# Patient Record
Sex: FEMALE | Race: OTHER | NOT HISPANIC OR LATINO | URBAN - METROPOLITAN AREA
[De-identification: names, ages, dates, MRNs, and addresses within clinical notes are randomized per-mention and may not be internally consistent; named-entity substitution may affect disease eponyms.]

---

## 2018-07-30 ENCOUNTER — APPOINTMENT (RX ONLY)
Dept: URBAN - METROPOLITAN AREA CLINIC 57 | Facility: CLINIC | Age: 43
Setting detail: DERMATOLOGY
End: 2018-07-30

## 2018-07-30 DIAGNOSIS — L21.8 OTHER SEBORRHEIC DERMATITIS: ICD-10-CM

## 2018-07-30 DIAGNOSIS — L71.8 OTHER ROSACEA: ICD-10-CM

## 2018-07-30 PROCEDURE — ? TREATMENT REGIMEN

## 2018-07-30 PROCEDURE — ? SEPARATE AND IDENTIFIABLE DOCUMENTATION

## 2018-07-30 PROCEDURE — 99203 OFFICE O/P NEW LOW 30 MIN: CPT

## 2018-07-30 PROCEDURE — ? PRESCRIPTION

## 2018-07-30 PROCEDURE — ? COUNSELING

## 2018-07-30 RX ORDER — METRONIDAZOLE 7.5 MG/G
CREAM TOPICAL QAM
Qty: 1 | Refills: 2 | Status: ERX | COMMUNITY
Start: 2018-07-30

## 2018-07-30 RX ORDER — DOXYCYCLINE HYCLATE 100 MG/1
TABLET, COATED ORAL
Qty: 30 | Refills: 0 | Status: ERX | COMMUNITY
Start: 2018-07-30

## 2018-07-30 RX ORDER — KETOCONAZOLE 20.5 MG/ML
SHAMPOO, SUSPENSION TOPICAL
Qty: 1 | Refills: 2 | Status: ERX | COMMUNITY
Start: 2018-07-30

## 2018-07-30 RX ADMIN — METRONIDAZOLE: 7.5 CREAM TOPICAL at 21:37

## 2018-07-30 RX ADMIN — DOXYCYCLINE HYCLATE: 100 TABLET, COATED ORAL at 21:37

## 2018-07-30 RX ADMIN — KETOCONAZOLE: 20.5 SHAMPOO, SUSPENSION TOPICAL at 21:36

## 2018-07-30 ASSESSMENT — LOCATION DETAILED DESCRIPTION DERM
LOCATION DETAILED: LEFT SUPERIOR LATERAL MALAR CHEEK
LOCATION DETAILED: RIGHT CENTRAL PARIETAL SCALP
LOCATION DETAILED: RIGHT CENTRAL MALAR CHEEK
LOCATION DETAILED: LEFT CENTRAL PARIETAL SCALP
LOCATION DETAILED: LEFT CENTRAL MALAR CHEEK
LOCATION DETAILED: LEFT INFERIOR CENTRAL MALAR CHEEK
LOCATION DETAILED: RIGHT CENTRAL MALAR CHEEK

## 2018-07-30 ASSESSMENT — LOCATION ZONE DERM
LOCATION ZONE: FACE
LOCATION ZONE: SCALP
LOCATION ZONE: FACE

## 2018-07-30 ASSESSMENT — LOCATION SIMPLE DESCRIPTION DERM
LOCATION SIMPLE: LEFT CHEEK
LOCATION SIMPLE: SCALP
LOCATION SIMPLE: RIGHT CHEEK
LOCATION SIMPLE: RIGHT CHEEK
LOCATION SIMPLE: LEFT CHEEK

## 2018-09-17 ENCOUNTER — APPOINTMENT (RX ONLY)
Dept: URBAN - METROPOLITAN AREA CLINIC 57 | Facility: CLINIC | Age: 43
Setting detail: DERMATOLOGY
End: 2018-09-17

## 2018-09-17 ENCOUNTER — RX ONLY (OUTPATIENT)
Age: 43
Setting detail: RX ONLY
End: 2018-09-17

## 2018-09-17 DIAGNOSIS — L21.8 OTHER SEBORRHEIC DERMATITIS: ICD-10-CM

## 2018-09-17 DIAGNOSIS — L71.8 OTHER ROSACEA: ICD-10-CM

## 2018-09-17 PROCEDURE — ? TREATMENT REGIMEN

## 2018-09-17 PROCEDURE — ? PRESCRIPTION

## 2018-09-17 PROCEDURE — ? COUNSELING

## 2018-09-17 PROCEDURE — 99213 OFFICE O/P EST LOW 20 MIN: CPT

## 2018-09-17 RX ORDER — METRONIDAZOLE 7.5 MG/G
CREAM TOPICAL QAM
Qty: 1 | Refills: 2 | Status: ERX

## 2018-09-17 RX ORDER — HYDROCORTISONE 25 MG/G
CREAM TOPICAL
Qty: 1 | Refills: 1 | Status: ERX | COMMUNITY
Start: 2018-09-17

## 2018-09-17 RX ORDER — KETOCONAZOLE 20.5 MG/ML
SHAMPOO, SUSPENSION TOPICAL
Qty: 1 | Refills: 2 | Status: ERX

## 2018-09-17 RX ORDER — DOXYCYCLINE HYCLATE 100 MG/1
TABLET, COATED ORAL
Qty: 30 | Refills: 1 | Status: ERX

## 2018-09-17 RX ADMIN — HYDROCORTISONE: 25 CREAM TOPICAL at 23:34

## 2018-09-17 ASSESSMENT — LOCATION SIMPLE DESCRIPTION DERM
LOCATION SIMPLE: LEFT CHEEK
LOCATION SIMPLE: LEFT SCALP
LOCATION SIMPLE: NOSE
LOCATION SIMPLE: RIGHT CHEEK

## 2018-09-17 ASSESSMENT — LOCATION DETAILED DESCRIPTION DERM
LOCATION DETAILED: LEFT MEDIAL FRONTAL SCALP
LOCATION DETAILED: LEFT INFERIOR MEDIAL MALAR CHEEK
LOCATION DETAILED: RIGHT MEDIAL MALAR CHEEK
LOCATION DETAILED: NASAL DORSUM
LOCATION DETAILED: RIGHT INFERIOR MEDIAL MALAR CHEEK
LOCATION DETAILED: LEFT INFERIOR CENTRAL MALAR CHEEK

## 2018-09-17 ASSESSMENT — LOCATION ZONE DERM
LOCATION ZONE: FACE
LOCATION ZONE: NOSE
LOCATION ZONE: SCALP

## 2018-09-17 NOTE — PROCEDURE: TREATMENT REGIMEN
Continue Regimen: Ketoconazole 2% shampoo QOD
Detail Level: Zone
Initiate Treatment: Hydrocortisone 2.5% cream QHS x2 weeks D/C x2 weeks
Continue Regimen: Metronidazole 0.75% cream QAM and doxycycline 100mg QD

## 2018-11-13 ENCOUNTER — APPOINTMENT (OUTPATIENT)
Dept: RADIOLOGY | Facility: MEDICAL CENTER | Age: 43
DRG: 189 | End: 2018-11-13
Attending: EMERGENCY MEDICINE
Payer: MEDICAID

## 2018-11-13 ENCOUNTER — APPOINTMENT (OUTPATIENT)
Dept: RADIOLOGY | Facility: MEDICAL CENTER | Age: 43
DRG: 189 | End: 2018-11-13
Payer: MEDICAID

## 2018-11-13 ENCOUNTER — HOSPITAL ENCOUNTER (INPATIENT)
Facility: MEDICAL CENTER | Age: 43
LOS: 8 days | DRG: 189 | End: 2018-11-21
Attending: EMERGENCY MEDICINE | Admitting: HOSPITALIST
Payer: MEDICAID

## 2018-11-13 DIAGNOSIS — J44.9 OSA AND COPD OVERLAP SYNDROME (HCC): ICD-10-CM

## 2018-11-13 DIAGNOSIS — R09.02 HYPOXIA: ICD-10-CM

## 2018-11-13 DIAGNOSIS — G47.33 OSA AND COPD OVERLAP SYNDROME (HCC): ICD-10-CM

## 2018-11-13 DIAGNOSIS — J44.1 ACUTE EXACERBATION OF CHRONIC OBSTRUCTIVE PULMONARY DISEASE (COPD) (HCC): ICD-10-CM

## 2018-11-13 DIAGNOSIS — J44.1 CHRONIC OBSTRUCTIVE PULMONARY DISEASE WITH ACUTE EXACERBATION (HCC): ICD-10-CM

## 2018-11-13 DIAGNOSIS — J96.01 ACUTE HYPOXEMIC RESPIRATORY FAILURE (HCC): ICD-10-CM

## 2018-11-13 DIAGNOSIS — E66.01 CLASS 3 SEVERE OBESITY DUE TO EXCESS CALORIES WITH SERIOUS COMORBIDITY AND BODY MASS INDEX (BMI) OF 50.0 TO 59.9 IN ADULT (HCC): ICD-10-CM

## 2018-11-13 PROBLEM — I51.7 CARDIOMEGALY: Status: ACTIVE | Noted: 2018-11-13

## 2018-11-13 PROBLEM — F31.9 BIPOLAR AFFECTIVE DISORDER (HCC): Status: ACTIVE | Noted: 2018-11-13

## 2018-11-13 LAB
ALBUMIN SERPL BCP-MCNC: 3.9 G/DL (ref 3.2–4.9)
ALBUMIN/GLOB SERPL: 1.4 G/DL
ALP SERPL-CCNC: 68 U/L (ref 30–99)
ALT SERPL-CCNC: 8 U/L (ref 2–50)
AMPHET UR QL SCN: NEGATIVE
ANION GAP SERPL CALC-SCNC: 3 MMOL/L (ref 0–11.9)
APPEARANCE UR: CLEAR
AST SERPL-CCNC: 6 U/L (ref 12–45)
BACTERIA #/AREA URNS HPF: ABNORMAL /HPF
BARBITURATES UR QL SCN: NEGATIVE
BASE EXCESS BLDA CALC-SCNC: 2 MMOL/L (ref -4–3)
BASOPHILS # BLD AUTO: 0.2 % (ref 0–1.8)
BASOPHILS # BLD: 0.02 K/UL (ref 0–0.12)
BENZODIAZ UR QL SCN: NEGATIVE
BILIRUB SERPL-MCNC: 1 MG/DL (ref 0.1–1.5)
BILIRUB UR QL STRIP.AUTO: NEGATIVE
BNP SERPL-MCNC: 45 PG/ML (ref 0–100)
BODY TEMPERATURE: ABNORMAL CENTIGRADE
BUN SERPL-MCNC: 11 MG/DL (ref 8–22)
BZE UR QL SCN: NEGATIVE
CALCIUM SERPL-MCNC: 9.3 MG/DL (ref 8.5–10.5)
CANNABINOIDS UR QL SCN: NEGATIVE
CHLORIDE SERPL-SCNC: 103 MMOL/L (ref 96–112)
CO2 SERPL-SCNC: 33 MMOL/L (ref 20–33)
COLOR UR: YELLOW
CREAT SERPL-MCNC: 0.73 MG/DL (ref 0.5–1.4)
D DIMER PPP IA.FEU-MCNC: <0.4 UG/ML (FEU) (ref 0–0.5)
EKG IMPRESSION: NORMAL
EOSINOPHIL # BLD AUTO: 0.18 K/UL (ref 0–0.51)
EOSINOPHIL NFR BLD: 1.5 % (ref 0–6.9)
EPI CELLS #/AREA URNS HPF: ABNORMAL /HPF
ERYTHROCYTE [DISTWIDTH] IN BLOOD BY AUTOMATED COUNT: 51.7 FL (ref 35.9–50)
FLUAV RNA SPEC QL NAA+PROBE: NEGATIVE
FLUBV RNA SPEC QL NAA+PROBE: NEGATIVE
GLOBULIN SER CALC-MCNC: 2.7 G/DL (ref 1.9–3.5)
GLUCOSE SERPL-MCNC: 84 MG/DL (ref 65–99)
GLUCOSE UR STRIP.AUTO-MCNC: NEGATIVE MG/DL
HCG SERPL QL: NEGATIVE
HCO3 BLDA-SCNC: 31 MMOL/L (ref 17–25)
HCT VFR BLD AUTO: 40.6 % (ref 37–47)
HGB BLD-MCNC: 12.2 G/DL (ref 12–16)
HYALINE CASTS #/AREA URNS LPF: ABNORMAL /LPF
IMM GRANULOCYTES # BLD AUTO: 0.05 K/UL (ref 0–0.11)
IMM GRANULOCYTES NFR BLD AUTO: 0.4 % (ref 0–0.9)
KETONES UR STRIP.AUTO-MCNC: NEGATIVE MG/DL
LEUKOCYTE ESTERASE UR QL STRIP.AUTO: NEGATIVE
LYMPHOCYTES # BLD AUTO: 1.56 K/UL (ref 1–4.8)
LYMPHOCYTES NFR BLD: 13 % (ref 22–41)
MAGNESIUM SERPL-MCNC: 2.4 MG/DL (ref 1.5–2.5)
MCH RBC QN AUTO: 29 PG (ref 27–33)
MCHC RBC AUTO-ENTMCNC: 30 G/DL (ref 33.6–35)
MCV RBC AUTO: 96.4 FL (ref 81.4–97.8)
METHADONE UR QL SCN: NEGATIVE
MICRO URNS: ABNORMAL
MONOCYTES # BLD AUTO: 0.64 K/UL (ref 0–0.85)
MONOCYTES NFR BLD AUTO: 5.3 % (ref 0–13.4)
NEUTROPHILS # BLD AUTO: 9.52 K/UL (ref 2–7.15)
NEUTROPHILS NFR BLD: 79.6 % (ref 44–72)
NITRITE UR QL STRIP.AUTO: NEGATIVE
NRBC # BLD AUTO: 0 K/UL
NRBC BLD-RTO: 0 /100 WBC
OPIATES UR QL SCN: NEGATIVE
OXYCODONE UR QL SCN: NEGATIVE
PCO2 BLDA: 66.8 MMHG (ref 26–37)
PCP UR QL SCN: NEGATIVE
PH BLDA: 7.28 [PH] (ref 7.4–7.5)
PH UR STRIP.AUTO: 6 [PH]
PLATELET # BLD AUTO: 227 K/UL (ref 164–446)
PMV BLD AUTO: 11.5 FL (ref 9–12.9)
PO2 BLDA: 75.4 MMHG (ref 64–87)
POTASSIUM SERPL-SCNC: 4.2 MMOL/L (ref 3.6–5.5)
PROCALCITONIN SERPL-MCNC: 0.11 NG/ML
PROPOXYPH UR QL SCN: NEGATIVE
PROT SERPL-MCNC: 6.6 G/DL (ref 6–8.2)
PROT UR QL STRIP: 100 MG/DL
RBC # BLD AUTO: 4.21 M/UL (ref 4.2–5.4)
RBC # URNS HPF: ABNORMAL /HPF
RBC UR QL AUTO: NEGATIVE
SAO2 % BLDA: 92.6 % (ref 93–99)
SODIUM SERPL-SCNC: 139 MMOL/L (ref 135–145)
SP GR UR STRIP.AUTO: 1.02
TROPONIN I SERPL-MCNC: 0.01 NG/ML (ref 0–0.04)
TSH SERPL DL<=0.005 MIU/L-ACNC: 3.8 UIU/ML (ref 0.38–5.33)
UROBILINOGEN UR STRIP.AUTO-MCNC: 0.2 MG/DL
WBC # BLD AUTO: 12 K/UL (ref 4.8–10.8)
WBC #/AREA URNS HPF: ABNORMAL /HPF

## 2018-11-13 PROCEDURE — 94640 AIRWAY INHALATION TREATMENT: CPT

## 2018-11-13 PROCEDURE — 84703 CHORIONIC GONADOTROPIN ASSAY: CPT

## 2018-11-13 PROCEDURE — 85025 COMPLETE CBC W/AUTO DIFF WBC: CPT

## 2018-11-13 PROCEDURE — 96374 THER/PROPH/DIAG INJ IV PUSH: CPT

## 2018-11-13 PROCEDURE — 81001 URINALYSIS AUTO W/SCOPE: CPT

## 2018-11-13 PROCEDURE — 84145 PROCALCITONIN (PCT): CPT

## 2018-11-13 PROCEDURE — 700111 HCHG RX REV CODE 636 W/ 250 OVERRIDE (IP): Performed by: STUDENT IN AN ORGANIZED HEALTH CARE EDUCATION/TRAINING PROGRAM

## 2018-11-13 PROCEDURE — 84443 ASSAY THYROID STIM HORMONE: CPT

## 2018-11-13 PROCEDURE — 93005 ELECTROCARDIOGRAM TRACING: CPT | Performed by: STUDENT IN AN ORGANIZED HEALTH CARE EDUCATION/TRAINING PROGRAM

## 2018-11-13 PROCEDURE — 700102 HCHG RX REV CODE 250 W/ 637 OVERRIDE(OP): Performed by: STUDENT IN AN ORGANIZED HEALTH CARE EDUCATION/TRAINING PROGRAM

## 2018-11-13 PROCEDURE — 304561 HCHG STAT O2

## 2018-11-13 PROCEDURE — 700111 HCHG RX REV CODE 636 W/ 250 OVERRIDE (IP): Performed by: EMERGENCY MEDICINE

## 2018-11-13 PROCEDURE — 700101 HCHG RX REV CODE 250: Performed by: EMERGENCY MEDICINE

## 2018-11-13 PROCEDURE — 83735 ASSAY OF MAGNESIUM: CPT

## 2018-11-13 PROCEDURE — 80053 COMPREHEN METABOLIC PANEL: CPT

## 2018-11-13 PROCEDURE — 87502 INFLUENZA DNA AMP PROBE: CPT

## 2018-11-13 PROCEDURE — 93010 ELECTROCARDIOGRAM REPORT: CPT | Performed by: INTERNAL MEDICINE

## 2018-11-13 PROCEDURE — 83036 HEMOGLOBIN GLYCOSYLATED A1C: CPT

## 2018-11-13 PROCEDURE — 84484 ASSAY OF TROPONIN QUANT: CPT

## 2018-11-13 PROCEDURE — 93005 ELECTROCARDIOGRAM TRACING: CPT | Performed by: EMERGENCY MEDICINE

## 2018-11-13 PROCEDURE — 82803 BLOOD GASES ANY COMBINATION: CPT

## 2018-11-13 PROCEDURE — 94760 N-INVAS EAR/PLS OXIMETRY 1: CPT

## 2018-11-13 PROCEDURE — 83880 ASSAY OF NATRIURETIC PEPTIDE: CPT

## 2018-11-13 PROCEDURE — 99285 EMERGENCY DEPT VISIT HI MDM: CPT

## 2018-11-13 PROCEDURE — 93005 ELECTROCARDIOGRAM TRACING: CPT

## 2018-11-13 PROCEDURE — A9270 NON-COVERED ITEM OR SERVICE: HCPCS | Performed by: STUDENT IN AN ORGANIZED HEALTH CARE EDUCATION/TRAINING PROGRAM

## 2018-11-13 PROCEDURE — A6250 SKIN SEAL PROTECT MOISTURIZR: HCPCS | Performed by: HOSPITALIST

## 2018-11-13 PROCEDURE — 85379 FIBRIN DEGRADATION QUANT: CPT

## 2018-11-13 PROCEDURE — 71045 X-RAY EXAM CHEST 1 VIEW: CPT

## 2018-11-13 PROCEDURE — 80307 DRUG TEST PRSMV CHEM ANLYZR: CPT

## 2018-11-13 PROCEDURE — 770020 HCHG ROOM/CARE - TELE (206)

## 2018-11-13 RX ORDER — ARIPIPRAZOLE 30 MG/1
30 TABLET ORAL EVERY MORNING
COMMUNITY
End: 2022-12-01 | Stop reason: SDUPTHER

## 2018-11-13 RX ORDER — ALBUTEROL SULFATE 90 UG/1
2 AEROSOL, METERED RESPIRATORY (INHALATION) EVERY 6 HOURS PRN
COMMUNITY
End: 2019-11-17

## 2018-11-13 RX ORDER — METHYLPREDNISOLONE SODIUM SUCCINATE 125 MG/2ML
125 INJECTION, POWDER, LYOPHILIZED, FOR SOLUTION INTRAMUSCULAR; INTRAVENOUS ONCE
Status: COMPLETED | OUTPATIENT
Start: 2018-11-13 | End: 2018-11-13

## 2018-11-13 RX ORDER — IPRATROPIUM BROMIDE AND ALBUTEROL SULFATE 2.5; .5 MG/3ML; MG/3ML
3 SOLUTION RESPIRATORY (INHALATION)
Status: DISCONTINUED | OUTPATIENT
Start: 2018-11-13 | End: 2018-11-14

## 2018-11-13 RX ORDER — IPRATROPIUM BROMIDE AND ALBUTEROL SULFATE 2.5; .5 MG/3ML; MG/3ML
3 SOLUTION RESPIRATORY (INHALATION)
Status: COMPLETED | OUTPATIENT
Start: 2018-11-13 | End: 2018-11-13

## 2018-11-13 RX ORDER — AZITHROMYCIN 250 MG/1
250 TABLET, FILM COATED ORAL DAILY
Status: COMPLETED | OUTPATIENT
Start: 2018-11-14 | End: 2018-11-17

## 2018-11-13 RX ORDER — DOXYCYCLINE HYCLATE 100 MG
100 TABLET ORAL DAILY
COMMUNITY
Start: 2019-10-02 | End: 2019-10-08

## 2018-11-13 RX ORDER — LITHIUM CARBONATE 300 MG
300-600 TABLET ORAL 2 TIMES DAILY
Status: DISCONTINUED | OUTPATIENT
Start: 2018-11-13 | End: 2018-11-13

## 2018-11-13 RX ORDER — BISACODYL 10 MG
10 SUPPOSITORY, RECTAL RECTAL
Status: DISCONTINUED | OUTPATIENT
Start: 2018-11-13 | End: 2018-11-21 | Stop reason: HOSPADM

## 2018-11-13 RX ORDER — IBUPROFEN 800 MG/1
800 TABLET ORAL
COMMUNITY
Start: 2019-10-02 | End: 2019-10-08

## 2018-11-13 RX ORDER — POLYETHYLENE GLYCOL 3350 17 G/17G
1 POWDER, FOR SOLUTION ORAL
Status: DISCONTINUED | OUTPATIENT
Start: 2018-11-13 | End: 2018-11-21 | Stop reason: HOSPADM

## 2018-11-13 RX ORDER — BUDESONIDE AND FORMOTEROL FUMARATE DIHYDRATE 160; 4.5 UG/1; UG/1
2 AEROSOL RESPIRATORY (INHALATION)
Status: DISCONTINUED | OUTPATIENT
Start: 2018-11-13 | End: 2018-11-13

## 2018-11-13 RX ORDER — LABETALOL HYDROCHLORIDE 5 MG/ML
10 INJECTION, SOLUTION INTRAVENOUS EVERY 4 HOURS PRN
Status: DISCONTINUED | OUTPATIENT
Start: 2018-11-13 | End: 2018-11-21 | Stop reason: HOSPADM

## 2018-11-13 RX ORDER — BENZTROPINE MESYLATE 1 MG/1
1 TABLET ORAL EVERY MORNING
COMMUNITY
Start: 2019-10-03 | End: 2019-10-08

## 2018-11-13 RX ORDER — AZITHROMYCIN 250 MG/1
500 TABLET, FILM COATED ORAL ONCE
Status: COMPLETED | OUTPATIENT
Start: 2018-11-13 | End: 2018-11-13

## 2018-11-13 RX ORDER — ACETAMINOPHEN 325 MG/1
650 TABLET ORAL EVERY 6 HOURS PRN
Status: DISCONTINUED | OUTPATIENT
Start: 2018-11-13 | End: 2018-11-21 | Stop reason: HOSPADM

## 2018-11-13 RX ORDER — GUAIFENESIN/DEXTROMETHORPHAN 100-10MG/5
10 SYRUP ORAL EVERY 6 HOURS PRN
Status: DISCONTINUED | OUTPATIENT
Start: 2018-11-13 | End: 2018-11-14

## 2018-11-13 RX ORDER — LITHIUM CARBONATE 300 MG
300-600 TABLET ORAL 2 TIMES DAILY
COMMUNITY
End: 2019-11-17

## 2018-11-13 RX ORDER — BUDESONIDE AND FORMOTEROL FUMARATE DIHYDRATE 160; 4.5 UG/1; UG/1
2 AEROSOL RESPIRATORY (INHALATION)
Status: DISCONTINUED | OUTPATIENT
Start: 2018-11-13 | End: 2018-11-15

## 2018-11-13 RX ORDER — ARIPIPRAZOLE 10 MG/1
30 TABLET ORAL EVERY MORNING
Status: DISCONTINUED | OUTPATIENT
Start: 2018-11-14 | End: 2018-11-21 | Stop reason: HOSPADM

## 2018-11-13 RX ORDER — LITHIUM CARBONATE 300 MG
300 TABLET ORAL EVERY MORNING
Status: DISCONTINUED | OUTPATIENT
Start: 2018-11-14 | End: 2018-11-21 | Stop reason: HOSPADM

## 2018-11-13 RX ORDER — BENZTROPINE MESYLATE 1 MG/1
1 TABLET ORAL EVERY MORNING
Status: DISCONTINUED | OUTPATIENT
Start: 2018-11-14 | End: 2018-11-21 | Stop reason: HOSPADM

## 2018-11-13 RX ORDER — METHYLPREDNISOLONE SODIUM SUCCINATE 40 MG/ML
40 INJECTION, POWDER, LYOPHILIZED, FOR SOLUTION INTRAMUSCULAR; INTRAVENOUS EVERY 6 HOURS
Status: DISCONTINUED | OUTPATIENT
Start: 2018-11-14 | End: 2018-11-14

## 2018-11-13 RX ORDER — LITHIUM CARBONATE 300 MG
600 TABLET ORAL
Status: DISCONTINUED | OUTPATIENT
Start: 2018-11-13 | End: 2018-11-21 | Stop reason: HOSPADM

## 2018-11-13 RX ORDER — DOXEPIN HYDROCHLORIDE 25 MG/1
25 CAPSULE ORAL
Status: DISCONTINUED | OUTPATIENT
Start: 2018-11-13 | End: 2018-11-21 | Stop reason: HOSPADM

## 2018-11-13 RX ORDER — DOXEPIN HYDROCHLORIDE 25 MG/1
25 CAPSULE ORAL
COMMUNITY
Start: 2019-10-02 | End: 2019-10-08

## 2018-11-13 RX ORDER — AMOXICILLIN 250 MG
2 CAPSULE ORAL 2 TIMES DAILY
Status: DISCONTINUED | OUTPATIENT
Start: 2018-11-13 | End: 2018-11-21 | Stop reason: HOSPADM

## 2018-11-13 RX ADMIN — METHYLPREDNISOLONE SODIUM SUCCINATE 125 MG: 125 INJECTION, POWDER, FOR SOLUTION INTRAMUSCULAR; INTRAVENOUS at 19:20

## 2018-11-13 RX ADMIN — METHYLPREDNISOLONE SODIUM SUCCINATE 40 MG: 40 INJECTION, POWDER, FOR SOLUTION INTRAMUSCULAR; INTRAVENOUS at 23:53

## 2018-11-13 RX ADMIN — AZITHROMYCIN MONOHYDRATE 500 MG: 250 TABLET ORAL at 23:51

## 2018-11-13 RX ADMIN — IPRATROPIUM BROMIDE AND ALBUTEROL SULFATE 3 ML: .5; 3 SOLUTION RESPIRATORY (INHALATION) at 18:58

## 2018-11-13 RX ADMIN — DOXEPIN HYDROCHLORIDE 25 MG: 25 CAPSULE ORAL at 23:52

## 2018-11-13 RX ADMIN — LITHIUM CARBONATE 600 MG: 300 TABLET ORAL at 23:53

## 2018-11-13 RX ADMIN — BUDESONIDE AND FORMOTEROL FUMARATE DIHYDRATE 2 PUFF: 160; 4.5 AEROSOL RESPIRATORY (INHALATION) at 23:54

## 2018-11-13 ASSESSMENT — ENCOUNTER SYMPTOMS
DIARRHEA: 1
COUGH: 1
EYE REDNESS: 0
WHEEZING: 1
SINUS PAIN: 0
DEPRESSION: 0
VOMITING: 0
SPUTUM PRODUCTION: 1
CHILLS: 1
PND: 1
HEMOPTYSIS: 0
HALLUCINATIONS: 0
FOCAL WEAKNESS: 0
MYALGIAS: 0
SEIZURES: 0
HEADACHES: 1
SORE THROAT: 0
STRIDOR: 0
DIZZINESS: 0
PALPITATIONS: 0
EYE DISCHARGE: 0
ABDOMINAL PAIN: 1
FEVER: 0
NAUSEA: 0
SHORTNESS OF BREATH: 1

## 2018-11-13 ASSESSMENT — PATIENT HEALTH QUESTIONNAIRE - PHQ9
SUM OF ALL RESPONSES TO PHQ9 QUESTIONS 1 AND 2: 0
1. LITTLE INTEREST OR PLEASURE IN DOING THINGS: NOT AT ALL
2. FEELING DOWN, DEPRESSED, IRRITABLE, OR HOPELESS: NOT AT ALL

## 2018-11-13 ASSESSMENT — LIFESTYLE VARIABLES: DO YOU DRINK ALCOHOL: NO

## 2018-11-14 ENCOUNTER — APPOINTMENT (OUTPATIENT)
Dept: RADIOLOGY | Facility: MEDICAL CENTER | Age: 43
DRG: 189 | End: 2018-11-14
Attending: STUDENT IN AN ORGANIZED HEALTH CARE EDUCATION/TRAINING PROGRAM
Payer: MEDICAID

## 2018-11-14 ENCOUNTER — APPOINTMENT (OUTPATIENT)
Dept: CARDIOLOGY | Facility: MEDICAL CENTER | Age: 43
DRG: 189 | End: 2018-11-14
Attending: STUDENT IN AN ORGANIZED HEALTH CARE EDUCATION/TRAINING PROGRAM
Payer: MEDICAID

## 2018-11-14 PROBLEM — E66.01 CLASS 3 SEVERE OBESITY DUE TO EXCESS CALORIES WITH BODY MASS INDEX (BMI) OF 50.0 TO 59.9 IN ADULT (HCC): Status: ACTIVE | Noted: 2018-11-14

## 2018-11-14 PROBLEM — E66.813 CLASS 3 SEVERE OBESITY DUE TO EXCESS CALORIES WITH BODY MASS INDEX (BMI) OF 50.0 TO 59.9 IN ADULT (HCC): Status: ACTIVE | Noted: 2018-11-14

## 2018-11-14 LAB
ANION GAP SERPL CALC-SCNC: 3 MMOL/L (ref 0–11.9)
ANION GAP SERPL CALC-SCNC: 4 MMOL/L (ref 0–11.9)
ANISOCYTOSIS BLD QL SMEAR: ABNORMAL
BASE EXCESS BLDA CALC-SCNC: 2 MMOL/L (ref -4–3)
BASE EXCESS BLDV CALC-SCNC: 1 MMOL/L
BASO STIPL BLD QL SMEAR: NORMAL
BASOPHILS # BLD AUTO: 0.2 % (ref 0–1.8)
BASOPHILS # BLD: 0.02 K/UL (ref 0–0.12)
BODY TEMPERATURE: ABNORMAL CENTIGRADE
BODY TEMPERATURE: ABNORMAL CENTIGRADE
BUN SERPL-MCNC: 12 MG/DL (ref 8–22)
BUN SERPL-MCNC: 13 MG/DL (ref 8–22)
CALCIUM SERPL-MCNC: 9.2 MG/DL (ref 8.5–10.5)
CALCIUM SERPL-MCNC: 9.5 MG/DL (ref 8.5–10.5)
CHLORIDE SERPL-SCNC: 102 MMOL/L (ref 96–112)
CHLORIDE SERPL-SCNC: 102 MMOL/L (ref 96–112)
CHOLEST SERPL-MCNC: 138 MG/DL (ref 100–199)
CO2 SERPL-SCNC: 31 MMOL/L (ref 20–33)
CO2 SERPL-SCNC: 34 MMOL/L (ref 20–33)
COMMENT 1642: NORMAL
CREAT SERPL-MCNC: 0.69 MG/DL (ref 0.5–1.4)
CREAT SERPL-MCNC: 0.7 MG/DL (ref 0.5–1.4)
DACRYOCYTES BLD QL SMEAR: NORMAL
EKG IMPRESSION: NORMAL
EOSINOPHIL # BLD AUTO: 0 K/UL (ref 0–0.51)
EOSINOPHIL NFR BLD: 0 % (ref 0–6.9)
ERYTHROCYTE [DISTWIDTH] IN BLOOD BY AUTOMATED COUNT: 51.8 FL (ref 35.9–50)
EST. AVERAGE GLUCOSE BLD GHB EST-MCNC: 128 MG/DL
GLUCOSE SERPL-MCNC: 129 MG/DL (ref 65–99)
GLUCOSE SERPL-MCNC: 154 MG/DL (ref 65–99)
HBA1C MFR BLD: 6.1 % (ref 0–5.6)
HCO3 BLDA-SCNC: 32 MMOL/L (ref 17–25)
HCO3 BLDV-SCNC: 27 MMOL/L (ref 24–28)
HCT VFR BLD AUTO: 41.4 % (ref 37–47)
HDLC SERPL-MCNC: 41 MG/DL
HGB BLD-MCNC: 12.3 G/DL (ref 12–16)
HYPOCHROMIA BLD QL SMEAR: ABNORMAL
IMM GRANULOCYTES # BLD AUTO: 0.08 K/UL (ref 0–0.11)
IMM GRANULOCYTES NFR BLD AUTO: 0.7 % (ref 0–0.9)
LDLC SERPL CALC-MCNC: 76 MG/DL
LG PLATELETS BLD QL SMEAR: NORMAL
LITHIUM SERPL-MCNC: 0.9 MMOL/L (ref 0.6–1.2)
LV EJECT FRACT  99904: 65
LYMPHOCYTES # BLD AUTO: 0.48 K/UL (ref 1–4.8)
LYMPHOCYTES NFR BLD: 4.2 % (ref 22–41)
MACROCYTES BLD QL SMEAR: ABNORMAL
MAGNESIUM SERPL-MCNC: 2.5 MG/DL (ref 1.5–2.5)
MCH RBC QN AUTO: 28.7 PG (ref 27–33)
MCHC RBC AUTO-ENTMCNC: 29.7 G/DL (ref 33.6–35)
MCV RBC AUTO: 96.5 FL (ref 81.4–97.8)
MICROCYTES BLD QL SMEAR: ABNORMAL
MONOCYTES # BLD AUTO: 0.06 K/UL (ref 0–0.85)
MONOCYTES NFR BLD AUTO: 0.5 % (ref 0–13.4)
MORPHOLOGY BLD-IMP: NORMAL
NEUTROPHILS # BLD AUTO: 10.81 K/UL (ref 2–7.15)
NEUTROPHILS NFR BLD: 94.4 % (ref 44–72)
NRBC # BLD AUTO: 0 K/UL
NRBC BLD-RTO: 0 /100 WBC
OVALOCYTES BLD QL SMEAR: NORMAL
PCO2 BLDA: 77.1 MMHG (ref 26–37)
PCO2 BLDV: 49.8 MMHG (ref 41–51)
PH BLDA: 7.23 [PH] (ref 7.4–7.5)
PH BLDV: 7.35 [PH] (ref 7.31–7.45)
PHOSPHATE SERPL-MCNC: 3.4 MG/DL (ref 2.5–4.5)
PLATELET # BLD AUTO: 215 K/UL (ref 164–446)
PLATELET BLD QL SMEAR: NORMAL
PMV BLD AUTO: 11.4 FL (ref 9–12.9)
PO2 BLDA: 79.5 MMHG (ref 64–87)
PO2 BLDV: 96.8 MMHG (ref 25–40)
POIKILOCYTOSIS BLD QL SMEAR: NORMAL
POLYCHROMASIA BLD QL SMEAR: NORMAL
POTASSIUM SERPL-SCNC: 4.9 MMOL/L (ref 3.6–5.5)
POTASSIUM SERPL-SCNC: 5.2 MMOL/L (ref 3.6–5.5)
RBC # BLD AUTO: 4.29 M/UL (ref 4.2–5.4)
RBC BLD AUTO: PRESENT
SAO2 % BLDA: 93.1 % (ref 93–99)
SAO2 % BLDV: 97.2 %
SCHISTOCYTES BLD QL SMEAR: NORMAL
SODIUM SERPL-SCNC: 136 MMOL/L (ref 135–145)
SODIUM SERPL-SCNC: 140 MMOL/L (ref 135–145)
TRIGL SERPL-MCNC: 106 MG/DL (ref 0–149)
WBC # BLD AUTO: 11.5 K/UL (ref 4.8–10.8)

## 2018-11-14 PROCEDURE — 80178 ASSAY OF LITHIUM: CPT

## 2018-11-14 PROCEDURE — 770022 HCHG ROOM/CARE - ICU (200)

## 2018-11-14 PROCEDURE — A9270 NON-COVERED ITEM OR SERVICE: HCPCS | Performed by: STUDENT IN AN ORGANIZED HEALTH CARE EDUCATION/TRAINING PROGRAM

## 2018-11-14 PROCEDURE — 93306 TTE W/DOPPLER COMPLETE: CPT

## 2018-11-14 PROCEDURE — 700111 HCHG RX REV CODE 636 W/ 250 OVERRIDE (IP): Performed by: STUDENT IN AN ORGANIZED HEALTH CARE EDUCATION/TRAINING PROGRAM

## 2018-11-14 PROCEDURE — 700102 HCHG RX REV CODE 250 W/ 637 OVERRIDE(OP): Performed by: STUDENT IN AN ORGANIZED HEALTH CARE EDUCATION/TRAINING PROGRAM

## 2018-11-14 PROCEDURE — 85025 COMPLETE CBC W/AUTO DIFF WBC: CPT

## 2018-11-14 PROCEDURE — 97165 OT EVAL LOW COMPLEX 30 MIN: CPT

## 2018-11-14 PROCEDURE — 83735 ASSAY OF MAGNESIUM: CPT

## 2018-11-14 PROCEDURE — 94640 AIRWAY INHALATION TREATMENT: CPT

## 2018-11-14 PROCEDURE — 700102 HCHG RX REV CODE 250 W/ 637 OVERRIDE(OP): Performed by: INTERNAL MEDICINE

## 2018-11-14 PROCEDURE — 700117 HCHG RX CONTRAST REV CODE 255: Performed by: STUDENT IN AN ORGANIZED HEALTH CARE EDUCATION/TRAINING PROGRAM

## 2018-11-14 PROCEDURE — G8987 SELF CARE CURRENT STATUS: HCPCS | Mod: CJ

## 2018-11-14 PROCEDURE — G8979 MOBILITY GOAL STATUS: HCPCS | Mod: CI

## 2018-11-14 PROCEDURE — G8988 SELF CARE GOAL STATUS: HCPCS | Mod: CI

## 2018-11-14 PROCEDURE — 80048 BASIC METABOLIC PNL TOTAL CA: CPT

## 2018-11-14 PROCEDURE — 700101 HCHG RX REV CODE 250: Performed by: STUDENT IN AN ORGANIZED HEALTH CARE EDUCATION/TRAINING PROGRAM

## 2018-11-14 PROCEDURE — 93306 TTE W/DOPPLER COMPLETE: CPT | Mod: 26 | Performed by: INTERNAL MEDICINE

## 2018-11-14 PROCEDURE — G8978 MOBILITY CURRENT STATUS: HCPCS | Mod: CL

## 2018-11-14 PROCEDURE — A9270 NON-COVERED ITEM OR SERVICE: HCPCS | Performed by: INTERNAL MEDICINE

## 2018-11-14 PROCEDURE — 99291 CRITICAL CARE FIRST HOUR: CPT | Performed by: INTERNAL MEDICINE

## 2018-11-14 PROCEDURE — 97162 PT EVAL MOD COMPLEX 30 MIN: CPT

## 2018-11-14 PROCEDURE — 94002 VENT MGMT INPAT INIT DAY: CPT

## 2018-11-14 PROCEDURE — 80061 LIPID PANEL: CPT

## 2018-11-14 PROCEDURE — 5A09357 ASSISTANCE WITH RESPIRATORY VENTILATION, LESS THAN 24 CONSECUTIVE HOURS, CONTINUOUS POSITIVE AIRWAY PRESSURE: ICD-10-PCS | Performed by: HOSPITALIST

## 2018-11-14 PROCEDURE — 84100 ASSAY OF PHOSPHORUS: CPT

## 2018-11-14 PROCEDURE — 82803 BLOOD GASES ANY COMBINATION: CPT

## 2018-11-14 RX ORDER — IPRATROPIUM BROMIDE AND ALBUTEROL SULFATE 2.5; .5 MG/3ML; MG/3ML
3 SOLUTION RESPIRATORY (INHALATION)
Status: DISCONTINUED | OUTPATIENT
Start: 2018-11-14 | End: 2018-11-21 | Stop reason: HOSPADM

## 2018-11-14 RX ORDER — FUROSEMIDE 10 MG/ML
10 INJECTION INTRAMUSCULAR; INTRAVENOUS ONCE
Status: COMPLETED | OUTPATIENT
Start: 2018-11-14 | End: 2018-11-14

## 2018-11-14 RX ORDER — ALBUTEROL SULFATE 90 UG/1
2 AEROSOL, METERED RESPIRATORY (INHALATION) 2 TIMES DAILY
Status: DISCONTINUED | OUTPATIENT
Start: 2018-11-14 | End: 2018-11-21 | Stop reason: HOSPADM

## 2018-11-14 RX ORDER — NYSTATIN 100000 [USP'U]/G
POWDER TOPICAL 2 TIMES DAILY
Status: DISCONTINUED | OUTPATIENT
Start: 2018-11-14 | End: 2018-11-21 | Stop reason: HOSPADM

## 2018-11-14 RX ORDER — PREDNISONE 20 MG/1
40 TABLET ORAL DAILY
Status: COMPLETED | OUTPATIENT
Start: 2018-11-15 | End: 2018-11-18

## 2018-11-14 RX ADMIN — BENZTROPINE MESYLATE 1 MG: 1 TABLET ORAL at 10:52

## 2018-11-14 RX ADMIN — FUROSEMIDE 10 MG: 10 INJECTION, SOLUTION INTRAMUSCULAR; INTRAVENOUS at 11:42

## 2018-11-14 RX ADMIN — BUDESONIDE AND FORMOTEROL FUMARATE DIHYDRATE 2 PUFF: 160; 4.5 AEROSOL RESPIRATORY (INHALATION) at 10:56

## 2018-11-14 RX ADMIN — LITHIUM CARBONATE 300 MG: 300 TABLET ORAL at 10:54

## 2018-11-14 RX ADMIN — HUMAN ALBUMIN MICROSPHERES AND PERFLUTREN 3 ML: 10; .22 INJECTION, SOLUTION INTRAVENOUS at 11:03

## 2018-11-14 RX ADMIN — DOXEPIN HYDROCHLORIDE 25 MG: 25 CAPSULE ORAL at 20:55

## 2018-11-14 RX ADMIN — IPRATROPIUM BROMIDE AND ALBUTEROL SULFATE 3 ML: .5; 3 SOLUTION RESPIRATORY (INHALATION) at 02:16

## 2018-11-14 RX ADMIN — STANDARDIZED SENNA CONCENTRATE AND DOCUSATE SODIUM 2 TABLET: 8.6; 5 TABLET, FILM COATED ORAL at 09:13

## 2018-11-14 RX ADMIN — NYSTATIN: 100000 POWDER TOPICAL at 17:47

## 2018-11-14 RX ADMIN — AZITHROMYCIN MONOHYDRATE 250 MG: 250 TABLET ORAL at 09:12

## 2018-11-14 RX ADMIN — ARIPIPRAZOLE 30 MG: 10 TABLET ORAL at 09:10

## 2018-11-14 RX ADMIN — LITHIUM CARBONATE 600 MG: 300 TABLET ORAL at 20:55

## 2018-11-14 RX ADMIN — BUDESONIDE AND FORMOTEROL FUMARATE DIHYDRATE 2 PUFF: 160; 4.5 AEROSOL RESPIRATORY (INHALATION) at 20:30

## 2018-11-14 RX ADMIN — NYSTATIN: 100000 POWDER TOPICAL at 09:15

## 2018-11-14 RX ADMIN — IPRATROPIUM BROMIDE AND ALBUTEROL SULFATE 3 ML: .5; 3 SOLUTION RESPIRATORY (INHALATION) at 08:03

## 2018-11-14 RX ADMIN — ALBUTEROL SULFATE 2 PUFF: 90 AEROSOL, METERED RESPIRATORY (INHALATION) at 17:45

## 2018-11-14 RX ADMIN — ENOXAPARIN SODIUM 40 MG: 100 INJECTION SUBCUTANEOUS at 09:14

## 2018-11-14 RX ADMIN — STANDARDIZED SENNA CONCENTRATE AND DOCUSATE SODIUM 2 TABLET: 8.6; 5 TABLET, FILM COATED ORAL at 17:44

## 2018-11-14 RX ADMIN — METHYLPREDNISOLONE SODIUM SUCCINATE 40 MG: 40 INJECTION, POWDER, FOR SOLUTION INTRAMUSCULAR; INTRAVENOUS at 14:43

## 2018-11-14 RX ADMIN — METHYLPREDNISOLONE SODIUM SUCCINATE 40 MG: 40 INJECTION, POWDER, FOR SOLUTION INTRAMUSCULAR; INTRAVENOUS at 09:15

## 2018-11-14 ASSESSMENT — COGNITIVE AND FUNCTIONAL STATUS - GENERAL
DAILY ACTIVITIY SCORE: 16
CLIMB 3 TO 5 STEPS WITH RAILING: TOTAL
DRESSING REGULAR LOWER BODY CLOTHING: TOTAL
SUGGESTED CMS G CODE MODIFIER DAILY ACTIVITY: CK
DRESSING REGULAR UPPER BODY CLOTHING: A LITTLE
PERSONAL GROOMING: A LITTLE
HELP NEEDED FOR BATHING: A LOT
MOBILITY SCORE: 9
TURNING FROM BACK TO SIDE WHILE IN FLAT BAD: UNABLE
TOILETING: A LITTLE
MOVING FROM LYING ON BACK TO SITTING ON SIDE OF FLAT BED: UNABLE
STANDING UP FROM CHAIR USING ARMS: A LITTLE
WALKING IN HOSPITAL ROOM: A LOT
MOVING TO AND FROM BED TO CHAIR: UNABLE
SUGGESTED CMS G CODE MODIFIER MOBILITY: CM

## 2018-11-14 ASSESSMENT — ENCOUNTER SYMPTOMS
ABDOMINAL DISTENTION: 0
NUMBNESS: 0
DIZZINESS: 0
AGITATION: 0
COUGH: 1
VOMITING: 0
WHEEZING: 0
CHILLS: 0
FEVER: 0
WEAKNESS: 0
SINUS PAIN: 0
NAUSEA: 0
HEADACHES: 0
EYE PAIN: 0
SHORTNESS OF BREATH: 1
ABDOMINAL PAIN: 0
CHEST TIGHTNESS: 0
PALPITATIONS: 0

## 2018-11-14 ASSESSMENT — ACTIVITIES OF DAILY LIVING (ADL): TOILETING: INDEPENDENT

## 2018-11-14 ASSESSMENT — COPD QUESTIONNAIRES
HAVE YOU SMOKED AT LEAST 100 CIGARETTES IN YOUR ENTIRE LIFE: YES
COPD SCREENING SCORE: 5
COPD SCREENING SCORE: 5
DURING THE PAST 4 WEEKS HOW MUCH DID YOU FEEL SHORT OF BREATH: SOME OF THE TIME
DURING THE PAST 4 WEEKS HOW MUCH DID YOU FEEL SHORT OF BREATH: SOME OF THE TIME
DO YOU EVER COUGH UP ANY MUCUS OR PHLEGM?: YES, A FEW DAYS A WEEK OR MONTH
DO YOU EVER COUGH UP ANY MUCUS OR PHLEGM?: YES, A FEW DAYS A WEEK OR MONTH
HAVE YOU SMOKED AT LEAST 100 CIGARETTES IN YOUR ENTIRE LIFE: YES

## 2018-11-14 ASSESSMENT — PULMONARY FUNCTION TESTS
EPAP_CMH2O: 8

## 2018-11-14 ASSESSMENT — PAIN SCALES - GENERAL
PAINLEVEL_OUTOF10: 0

## 2018-11-14 ASSESSMENT — LIFESTYLE VARIABLES
EVER_SMOKED: YES
EVER_SMOKED: YES

## 2018-11-14 NOTE — ED PROVIDER NOTES
ED Provider Note    Scribed for [place] Doss M.D. by Esme Yoon. 11/13/2018, 6:02 PM.    Primary care provider: Roman Montes M.D.  Means of arrival: walk-in  History obtained from: Patient  History limited by: none    CHIEF COMPLAINT  Chief Complaint   Patient presents with   • Shortness of Breath     increasing sob x 3 wks    • Sent by MD     for low pulse ox saturation 79% ra        HPI  Lorene Haro is a 43 y.o. female with a history of COPD who presents to the Emergency Department with progressively worse shortness of breath over the past 4 weeks. Patient states she has been shortness of breath for months now, however, states it has gotten increasingly worse over the past few weeks. Patient reports that she is supposed to be on oxygen secondary to having walking pneumonia twice, however, she endorses being off of it for 4 weeks. Patient states her SPO2 dropped to 70's at doctor's office and nurse reports her SPO2 was 82 in triage. She reports having chills, lower abdominal pain, increase in bowel movements, or cough non-productive. Patient denies any fevers or history of heart failure.    REVIEW OF SYSTEMS  Review of Systems   Constitutional: Positive for chills. Negative for fever.   Respiratory: Positive for cough and shortness of breath.    Gastrointestinal: Positive for abdominal pain and diarrhea.   All other systems negative.    PAST MEDICAL HISTORY   has a past medical history of Bipolar 2 disorder (HCC); Other psychological stress; and Schizophrenic disorder (HCC).    SURGICAL HISTORY   has a past surgical history that includes hysterectomy laparoscopy and colectomy.    SOCIAL HISTORY  Social History   Substance Use Topics   • Smoking status: Current Every Day Smoker     Packs/day: 0.50     Types: Cigarettes      Comment: since 1992, trying to quit   • Alcohol use No      History   Drug Use No       FAMILY HISTORY  None    CURRENT MEDICATIONS  Reviewed.  See Encounter Summary.  "    ALLERGIES  Allergies   Allergen Reactions   • Amoxicillin Rash       PHYSICAL EXAM  VITAL SIGNS: /83   Pulse 61   Temp 36.5 °C (97.7 °F)   Resp 16   Ht 1.575 m (5' 2\")   Wt (!) 140.9 kg (310 lb 10.1 oz)   BMI 56.81 kg/m²   Constitutional: Alert in no apparent distress.  HENT: No signs of trauma, Bilateral external ears normal, Nose normal.   Eyes: Pupils are equal and reactive, Conjunctiva normal, Non-icteric.   Neck: Normal range of motion, No tenderness, Supple, No stridor.   Lymphatic: No lymphadenopathy noted.   Cardiovascular: Regular rate and rhythm, no murmurs.   Thorax & Lungs: Diminished at bases with mild inspiratory wheezing. No chest tenderness.   Abdomen: Soft, No tenderness, No masses, No pulsatile masses. No peritoneal signs.  Skin: Warm, Dry, No erythema, No rash.   Back: No bony tenderness, No CVA tenderness.   Extremities: Intact distal pulses, 3+ pitting edema up to bilateral knees, No tenderness, No cyanosis  Musculoskeletal: Good range of motion in all major joints. No tenderness to palpation or major deformities noted.   Neurologic: Alert , Normal motor function, Normal sensory function, No focal deficits noted.   Psychiatric: Affect normal, Judgment normal, Mood normal.     DIAGNOSTIC STUDIES / PROCEDURES     LABS  Results for orders placed or performed during the hospital encounter of 11/13/18   CBC WITH DIFFERENTIAL   Result Value Ref Range    WBC 12.0 (H) 4.8 - 10.8 K/uL    RBC 4.21 4.20 - 5.40 M/uL    Hemoglobin 12.2 12.0 - 16.0 g/dL    Hematocrit 40.6 37.0 - 47.0 %    MCV 96.4 81.4 - 97.8 fL    MCH 29.0 27.0 - 33.0 pg    MCHC 30.0 (L) 33.6 - 35.0 g/dL    RDW 51.7 (H) 35.9 - 50.0 fL    Platelet Count 227 164 - 446 K/uL    MPV 11.5 9.0 - 12.9 fL    Neutrophils-Polys 79.60 (H) 44.00 - 72.00 %    Lymphocytes 13.00 (L) 22.00 - 41.00 %    Monocytes 5.30 0.00 - 13.40 %    Eosinophils 1.50 0.00 - 6.90 %    Basophils 0.20 0.00 - 1.80 %    Immature Granulocytes 0.40 0.00 - 0.90 %    " Nucleated RBC 0.00 /100 WBC    Neutrophils (Absolute) 9.52 (H) 2.00 - 7.15 K/uL    Lymphs (Absolute) 1.56 1.00 - 4.80 K/uL    Monos (Absolute) 0.64 0.00 - 0.85 K/uL    Eos (Absolute) 0.18 0.00 - 0.51 K/uL    Baso (Absolute) 0.02 0.00 - 0.12 K/uL    Immature Granulocytes (abs) 0.05 0.00 - 0.11 K/uL    NRBC (Absolute) 0.00 K/uL   COMP METABOLIC PANEL   Result Value Ref Range    Sodium 139 135 - 145 mmol/L    Potassium 4.2 3.6 - 5.5 mmol/L    Chloride 103 96 - 112 mmol/L    Co2 33 20 - 33 mmol/L    Anion Gap 3.0 0.0 - 11.9    Glucose 84 65 - 99 mg/dL    Bun 11 8 - 22 mg/dL    Creatinine 0.73 0.50 - 1.40 mg/dL    Calcium 9.3 8.5 - 10.5 mg/dL    AST(SGOT) 6 (L) 12 - 45 U/L    ALT(SGPT) 8 2 - 50 U/L    Alkaline Phosphatase 68 30 - 99 U/L    Total Bilirubin 1.0 0.1 - 1.5 mg/dL    Albumin 3.9 3.2 - 4.9 g/dL    Total Protein 6.6 6.0 - 8.2 g/dL    Globulin 2.7 1.9 - 3.5 g/dL    A-G Ratio 1.4 g/dL   Troponin STAT   Result Value Ref Range    Troponin I 0.01 0.00 - 0.04 ng/mL   Btype Natriuretic Peptide   Result Value Ref Range    B Natriuretic Peptide 45 0 - 100 pg/mL   Influenza By PCR, A/B   Result Value Ref Range    Influenza virus A RNA Negative Negative    Influenza virus B, PCR Negative Negative   HCG Qual Serum   Result Value Ref Range    Beta-Hcg Qualitative Serum Negative Negative   ESTIMATED GFR   Result Value Ref Range    GFR If African American >60 >60 mL/min/1.73 m 2    GFR If Non African American >60 >60 mL/min/1.73 m 2   EKG   Result Value Ref Range    Report       [facility] Emergency Dept.    Test Date:  2018  Pt Name:    ADELINA MILLAN                Department: ER  MRN:        [id number]                      Room:  Gender:     Female                       Technician: [id number]  :        1975                   Requested By:ER TRIAGE PROTOCOL  Order #:    [id number]                    Reading MD:    Measurements  Intervals                                Axis  Rate:       64                            P:          34  WI:         151                          QRS:        54  QRSD:       88                           T:          9  QT:         400  QTc:        413    Interpretive Statements  SINUS RHYTHM  BORDERLINE LOW VOLTAGE IN FRONTAL LEADS  No previous ECG available for comparison         All labs were reviewed by me.    EKG Time completed  16:27  12 Lead EKG interpreted by me to show:   Sinus rhythm   Rate 64  Axis: Normal  No ST or T wave changes to suggest ischemia  No ST elevation changes >1mm  Clinical Impression: Otherwise normal EKG. No prior EKG for comparison.    RADIOLOGY  DX-CHEST-PORTABLE (1 VIEW)   Final Result      Cardiomegaly is increased compared to 2006. Correlation with echocardiogram is recommended. Pericardial effusion not excluded.      Interstitial prominence likely represents interstitial edema.           The radiologist's interpretation of all radiological studies have been reviewed by me.    COURSE & MEDICAL DECISION MAKING  Pertinent Labs & Imaging studies reviewed. (See chart for details)    6:02 PM - Patient seen and examined at bedside. Patient will be treated with Duoneb nebulizer, Solu-medrol 125 mg injection. Ordered chest x-ray, HCG qual, troponin STAT, BNP, influenza by PCR, CBC, CMP, EKG to evaluate her symptoms.     8:07 PM - I spoke with the patient and her mother about plan of admission. Patient was comfortable with admission at this time.    8:22 PM -  I discussed the patient's case and the above findings with Dr. Salvador (hospitalist for [facility] med) who agreed to admit the patient.      Decision Making:  This is a 43 y.o. year old female who presents with shortness of breath and hypoxia.  On my initial examination, the patient was saturating well on 3 L by nasal cannula.  With any movement, the patient had desaturation to the 80s.  Patient is morbidly obese and had decreased air entry with mild basilar crackles bilaterally.  Differential diagnosis includes but is  not limited to COPD exacerbation, CHF, electrolyte abnormality, dehydration obesity hypoventilation syndrome, obstructive sleep apnea, pneumonia, influenza, ACS    On arrival the patient was started on DuoNeb and methylprednisolone was ordered for suspected COPD exacerbation.  IV access was obtained after some difficulty and revealed mild leukocytosis to 12 with a left shift, no significant anemia, or electrolyte disturbance.  Troponin testing was negative at 0.01 and BNP was not elevated at 45.  EKG showed no evidence of acute ischemia or infarction.  Chest x-ray showed cardiomegaly and interstitial prominence consistent with interstitial edema.    Aside from the patient's BNP her presentation was most consistent with CHF.  On my repeat evaluation, the patient remained hypoxic when not on oxygen.    Patient's case was discussed with R medicine who agreed to admit the patient.  Patient will be admitted in guarded condition.  Please see the admission, daily progress, and discharge notes for the ultimate disposition of this patient.      FINAL IMPRESSION  1. Hypoxia    2. Acute exacerbation of chronic obstructive pulmonary disease (COPD) ([facility])        -ADMIT-       Esme HARRIS (Ade), am scribing for, and in the presence of, [place] Doss M.D..    Electronically signed by: Esme Yoon (Ade), 11/13/2018    [place] HARRIS M.D. personally performed the services described in this documentation, as scribed by Esme oYon in my presence, and it is both accurate and complete. C.    The note accurately reflects work and decisions made by me.  [place] Doss  11/13/2018  8:49 PM

## 2018-11-14 NOTE — PROGRESS NOTES
Patient arrived to room at 0556.   CRN, rapid RN, this RN and RT at bedside. Patient alert, oriented and lethargic. Follows commands appropriately. Patient's VSS.CHG bath complete.  Nasal CPAP placed on patient.  Report received from JC Smith.   2 RN Skin assessment complete. Sacrum is pink and intact. Bilateral anterior and posterior feet demonstrate calloused dryness. Abdominal pannus red and blanching. Interdry applied under breasts and to skinfold.

## 2018-11-14 NOTE — ASSESSMENT & PLAN NOTE
Seen on chest xray, no previous cardiac history per patient  BNP normal on admission, repeat pending  Pain upon laying flat/chest tightness and SOB as well as possible pericardial effusion on chest xray, confirmatory echo negative.  TTE demonstrated EF 65% with normal wall motion, wall motion and valvular anatomy difficult to assess due to body habitus.

## 2018-11-14 NOTE — ASSESSMENT & PLAN NOTE
Severe obesity possible related to psychiatric medications and KATHIA  Recommend diet modification and outpatient referral for weight reductive surgery

## 2018-11-14 NOTE — ED NOTES
Pt ambulatory to room 2, changed into gown, warm blankets provided. Call light within reach, pt placed on monitor. ERP at bedside.

## 2018-11-14 NOTE — PROGRESS NOTES
Late entry:    Received report from JC Foss. Assumed care of patient at 0700. Patient on nasal CPAP.Patient is awake and oriented. Plan of care discussed, all questions and concerns addressed. Patient educated to call for assistance, she verbalizes understanding. Safety measures in place.

## 2018-11-14 NOTE — ASSESSMENT & PLAN NOTE
Related to known diagnosis of obstructive sleep apnea in addition to obesity hypoventilation syndrome  Continue to use BiPAP and titrate settings for naps and at night,     The mother mother told me that she would get the BiPAP machine that was in [place] however later finding that this machine was never delivered to their home.  Patient will need BiPAP machine prior to discharge and will need reports and records from her sleep study diagnosing her sleep apnea.     Chronic hypoxemia requiring oxygen titrate saturations to 88-92% to help with VQ match  Continue 5-day course of azithromycin clinically not significant for pneumonia  Follow-up on echocardiogram to evaluate right ventricular pressures  D-dimer unremarkable and history not consistent with pulmonary emboli  Continue to finish course of steroids times 5-day    Patient much improved today sitting up in a chair on minimal oxygen requirement.  Team on the floor when patient transfers will need to be very vigilant that anytime the patient is sleeping will require BiPAP therapy and a course at night.     Avoid all sedatives narcotics and marijuana

## 2018-11-14 NOTE — H&P
"      Internal Medicine Admitting History and Physical    Note Author: Love Angeles M.D.       Name Lorene Haro     1975   Age/Sex 43 y.o. female   MRN [id number]   Code Status Full     After 5PM or if no immediate response to page, please call for cross-coverage  Attending/Team: Dr Whitmore/Donte See Patient List for primary contact information  Call [phone] to page    1st Call - Day Intern (R1):   Dr Espinal 2nd Call - Day Sr. Resident (R2/R3):   Dr Gaines       Chief Complaint:   Shortness of Breath    HPI:  Ms Haro is a 43 year old female with PMHx COPD not on home O2, morbid obesity, bipolar disorder on ability and lithium, and schizophrenia on abilify who went to her PCPs for \"not feeling well\". She went to her PCP office today and was found to have O2 sat of 79% so was sent to the ED. States she has been short of breath for 3 weeks. Does not get worse with exertion, associated with cough productive of clear mucous, weakness, chills, chest tightness, confusion and headache. SOB has been progressively getting worse. Has been using her rescue inhaler more in these past 2-3 weeks and has been waking up at night short of breath and coughing. The chest tightness is located in the center of her chest and radiates to her back, worse with inspiration and denies any sensation of tearing/stabbing. The tightness is worse when she lays on her back, better with sitting up and has been there for the past 3 weeks. Noticed bilateral leg swelling since yesterday and bilateral leg cramping R > L as well. Denies any redness or unilateral swelling. Moved to [place] from [place] 3 weeks ago and states her symptoms all started since the move. Denies any long periods of being sedentary. States she had a sleep study a few weeks ago and was never informed of the results. Has been having diarrhea every other day and lower quadrant abdominal pain for the past 3 weeks as well. Denies any fevers, sore throat, congestion, " sick contacts, hemoptysis, or recent travel.     In the ED: O2 sats in the 80's w/out O2 at rest, placed on 3 L NC and O2 sat up to 97%. EKG showed no ST segment changes but was low voltage, chest xray showed enlarged cardiac silhouette and mild interstitial prominence with no evidence of pleural effusion but pericardial effusion not excluded. Trop < 0.01, BNP 43    Review of Systems   Constitutional: Positive for chills. Negative for fever.   HENT: Negative for congestion, sinus pain and sore throat.    Eyes: Negative for discharge and redness.   Respiratory: Positive for cough, sputum production, shortness of breath and wheezing. Negative for hemoptysis and stridor.    Cardiovascular: Positive for chest pain, leg swelling and PND. Negative for palpitations.   Gastrointestinal: Positive for abdominal pain and diarrhea. Negative for nausea and vomiting.   Genitourinary: Negative for dysuria and urgency.   Musculoskeletal: Negative for myalgias.   Skin: Negative for itching and rash.   Neurological: Positive for headaches. Negative for dizziness, focal weakness and seizures.   Psychiatric/Behavioral: Negative for depression, hallucinations and suicidal ideas.             Past Medical History (Chronic medical problem, known complications and current treatment)    Past Medical History:   Diagnosis Date   • Bipolar 2 disorder (HCC)    • Other psychological stress    • Schizophrenic disorder (HCC)        Past Surgical History:  Past Surgical History:   Procedure Laterality Date   • COLECTOMY     • HYSTERECTOMY LAPAROSCOPY         Current Outpatient Medications:  Home Medications     Reviewed by Robbie Mcdonald (Pharmacy Tech) on 11/13/18 at 2006  Med List Status: Complete   Medication Last Dose Status   albuterol 108 (90 Base) MCG/ACT Aero Soln inhalation aerosol unknown Active   aripiprazole (ABILIFY) 30 MG tablet 11/13/2018 Active   benztropine (COGENTIN) 1 MG Tab 11/13/2018 Active   doxepin (SINEQUAN) 25 MG Cap  "11/12/2018 Active   doxycycline (VIBRAMYCIN) 100 MG Tab unknown Active   Fluticasone Furoate-Vilanterol (BREO ELLIPTA) 100-25 MCG/INH AEROSOL POWDER, BREATH ACTIVATED unknwon Active   ibuprofen (MOTRIN) 800 MG Tab unknown Active   lithium (ESKALITH) 300 MG Tab 11/13/2018 Active                Medication Allergy/Sensitivities:  Allergies   Allergen Reactions   • Amoxicillin Rash         Family History (mandatory)   No family history on file.   Grandmother - breast cancer  Sister - bone cancer  Uncle - diabetes mellitus    Social History (mandatory)   Social History     Social History   • Marital status: Single     Spouse name: N/A   • Number of children: N/A   • Years of education: N/A     Occupational History   • Not on file.     Social History Main Topics   • Smoking status: Current Every Day Smoker     Packs/day: 0.50     Types: Cigarettes   • Smokeless tobacco: Not on file      Comment: since 1992, trying to quit   • Alcohol use No   • Drug use: No   • Sexual activity: Not on file     Other Topics Concern   • Not on file     Social History Narrative   • No narrative on file     Living situation: Lives in [place] with mother and sister.   PCP : Roman Montes M.D.    Physical Exam     Vitals:    11/13/18 1946 11/13/18 2016 11/13/18 2031 11/13/18 2045   BP:       Pulse: 76 74 71 78   Resp: 15 18 18 17   Temp:       SpO2: 97% 95% 95% 92%   Weight:       Height:         Body mass index is 56.81 kg/m².  /83   Pulse 78   Temp 36.5 °C (97.7 °F)   Resp 17   Ht 1.575 m (5' 2\")   Wt (!) 140.9 kg (310 lb 10.1 oz)   SpO2 92%   BMI 56.81 kg/m²   O2 therapy: Pulse Oximetry: 92 %, O2 (LPM): 3, FiO2%: 93 %, O2 Delivery: Nasal Cannula    Physical Exam   Constitutional: She is oriented to person, place, and time and well-developed, well-nourished, and in no distress.   HENT:   Head: Normocephalic and atraumatic.   mallampati 4, MMM, thick neck   Neck: No JVD present.   No JVD or LAD but such thick neck, exam not very " reliable   Cardiovascular: Normal rate and intact distal pulses.    Very distant heart sounds, could not appreciate any M/R/G but sounds so distant exam unreliable   Pulmonary/Chest: No respiratory distress.   Poor inspiratory effort, diffuse expiratory wheezing, no crackles or rhonci   Abdominal: Soft. Bowel sounds are normal. She exhibits no distension. There is no tenderness.   obese   Musculoskeletal: She exhibits edema.   Trace pitting edema bilaterally, erythematous skin bilateral shins, no calf tenderness   Lymphadenopathy:     She has no cervical adenopathy.   Neurological: She is alert and oriented to person, place, and time. No cranial nerve deficit.   Skin: Skin is warm and dry. She is not diaphoretic.   Erythematous skin changes in groin and pannus   Nursing note and vitals reviewed.        Data Review       Old Records Request:   Deferred  Current Records review/summary: Completed    Lab Data Review:  Recent Results (from the past 24 hour(s))   EKG    Collection Time: 18  4:27 PM   Result Value Ref Range    Report       [facility].    Test Date:  2018  Pt Name:    ADELINA MILLAN                Department: ER  MRN:        [id number]                      Room:  Gender:     Female                       Technician: [id number]  :        1975                   Requested By:ER TRIAGE PROTOCOL  Order #:    [id number]                    Reading MD:    Measurements  Intervals                                Axis  Rate:       64                           P:          34  IL:         151                          QRS:        54  QRSD:       88                           T:          9  QT:         400  QTc:        413    Interpretive Statements  SINUS RHYTHM  BORDERLINE LOW VOLTAGE IN FRONTAL LEADS  No previous ECG available for comparison     Influenza By PCR, A/B    Collection Time: 18  6:22 PM   Result Value Ref Range    Influenza virus A RNA Negative Negative     Influenza virus B, PCR Negative Negative   CBC WITH DIFFERENTIAL    Collection Time: 11/13/18  7:14 PM   Result Value Ref Range    WBC 12.0 (H) 4.8 - 10.8 K/uL    RBC 4.21 4.20 - 5.40 M/uL    Hemoglobin 12.2 12.0 - 16.0 g/dL    Hematocrit 40.6 37.0 - 47.0 %    MCV 96.4 81.4 - 97.8 fL    MCH 29.0 27.0 - 33.0 pg    MCHC 30.0 (L) 33.6 - 35.0 g/dL    RDW 51.7 (H) 35.9 - 50.0 fL    Platelet Count 227 164 - 446 K/uL    MPV 11.5 9.0 - 12.9 fL    Neutrophils-Polys 79.60 (H) 44.00 - 72.00 %    Lymphocytes 13.00 (L) 22.00 - 41.00 %    Monocytes 5.30 0.00 - 13.40 %    Eosinophils 1.50 0.00 - 6.90 %    Basophils 0.20 0.00 - 1.80 %    Immature Granulocytes 0.40 0.00 - 0.90 %    Nucleated RBC 0.00 /100 WBC    Neutrophils (Absolute) 9.52 (H) 2.00 - 7.15 K/uL    Lymphs (Absolute) 1.56 1.00 - 4.80 K/uL    Monos (Absolute) 0.64 0.00 - 0.85 K/uL    Eos (Absolute) 0.18 0.00 - 0.51 K/uL    Baso (Absolute) 0.02 0.00 - 0.12 K/uL    Immature Granulocytes (abs) 0.05 0.00 - 0.11 K/uL    NRBC (Absolute) 0.00 K/uL   COMP METABOLIC PANEL    Collection Time: 11/13/18  7:14 PM   Result Value Ref Range    Sodium 139 135 - 145 mmol/L    Potassium 4.2 3.6 - 5.5 mmol/L    Chloride 103 96 - 112 mmol/L    Co2 33 20 - 33 mmol/L    Anion Gap 3.0 0.0 - 11.9    Glucose 84 65 - 99 mg/dL    Bun 11 8 - 22 mg/dL    Creatinine 0.73 0.50 - 1.40 mg/dL    Calcium 9.3 8.5 - 10.5 mg/dL    AST(SGOT) 6 (L) 12 - 45 U/L    ALT(SGPT) 8 2 - 50 U/L    Alkaline Phosphatase 68 30 - 99 U/L    Total Bilirubin 1.0 0.1 - 1.5 mg/dL    Albumin 3.9 3.2 - 4.9 g/dL    Total Protein 6.6 6.0 - 8.2 g/dL    Globulin 2.7 1.9 - 3.5 g/dL    A-G Ratio 1.4 g/dL   Troponin STAT    Collection Time: 11/13/18  7:14 PM   Result Value Ref Range    Troponin I 0.01 0.00 - 0.04 ng/mL   Btype Natriuretic Peptide    Collection Time: 11/13/18  7:14 PM   Result Value Ref Range    B Natriuretic Peptide 45 0 - 100 pg/mL   HCG Qual Serum    Collection Time: 11/13/18  7:14 PM   Result Value Ref Range     Beta-Hcg Qualitative Serum Negative Negative   ESTIMATED GFR    Collection Time: 11/13/18  7:14 PM   Result Value Ref Range    GFR If African American >60 >60 mL/min/1.73 m 2    GFR If Non African American >60 >60 mL/min/1.73 m 2       Imaging/Procedures Review:    Independant Imaging Review: Completed  DX-CHEST-PORTABLE (1 VIEW)   Final Result      Cardiomegaly is increased compared to 2006. Correlation with echocardiogram is recommended. Pericardial effusion not excluded.      Interstitial prominence likely represents interstitial edema.         DX-CHEST-2 VIEWS    (Results Pending)   EC-ECHOCARDIOGRAM COMPLETE W/O CONT    (Results Pending)          EKG:   EKG Independant Review: Completed  QTc:413, HR: 64, Normal Sinus Rhythm, no ST/T changes     Records reviewed and summarized in current documentation :  Yes  [employer] teaching service handout given to patient:  No         Assessment/Plan     * Acute hypoxemic respiratory failure (HCC)- (present on admission)   Assessment & Plan    This appears to be a COPD exacerbation with likely worsening/precipitated by recent move to much higher elevated, obesity hypoventilation likely contributing to hypoxia and possibly some underlying cardiac issue   Patient did appear confused and was slurring her words at the beginning of the interview which was very concerning, improved with continued verbal stimulus, mother reported this was normal for her when she first wakes up, became AOx4  PE ruled out with low/intermediate Wells score, checked D-dimer which was wnl  ABG showed respiratory acidosis with partial compensation     Plan:  - admit to tele  - RT protocol  - duonebs q 6 hrs   - azithromycin and solu-medrol  -  Incentive spirometry  - continuous pulse ox  - recheck ABG in 4 hours after another breathing treatment     COPD (chronic obstructive pulmonary disease) (HCC)- (present on admission)   Assessment & Plan    Plan as above for acute hypoxic respiratory failure   Home o2  eval prior to discharge, after breathing improves      Cardiomegaly   Assessment & Plan    Seen on chest xray, no previous cardiac history per patient   BNP wnl, trace edema no crackles heard but mild edema seen on chest xray  Due to pain upon laying flat/chest tightness and SOB as well as possible pericardial effusion on chest xray, will get echo to evaluate  Patient has very large body habitus and I doubt the TTE will be a great quality but ordered that to start off with      Bipolar affective disorder (HCC)   Assessment & Plan    Denying any depressive symptoms/does not appear manic  Continue home abilify and lithium          Anticipated Hospital stay:  >2 midnights        Quality Measures  Quality-Core Measures   Reviewed items::  EKG reviewed, Labs reviewed, Medications reviewed and Radiology images reviewed  Henao catheter::  No Henao  DVT prophylaxis pharmacological::  Enoxaparin (Lovenox)  Ulcer Prophylaxis::  No    PCP: Roman Montes M.D.

## 2018-11-14 NOTE — PROGRESS NOTES
43 year old lady admitted overnight for acute hypoxic respiratory failure, with underlying COPD, morbid obesity, possible undiagnosed sleep apnea had type II respiratory failure, wheezing, with pH7.28, CO2 of 66 and bicarb of 31. She was saturating 92-95% on 3 lts of oxygen via NC. She was given steroids, zithromax,ONEB scheduled and symbicort overnight.     Her ABG is worse this morning with worsening respiratory acidosis with no wheezing, pH 7.23, CO2 of 77 and bicarb 32. Afebrile, ND 74/min, BP 96/43, sats 92% on 3 lts.  Patient sleeping, difficult to arouse, not in distress. No wheezing, some B/L pitting edema.     She will benefit from BiPAP, however this cannot be administered on the floor for respiratory failure.  Discussed with Dr. Pollock --> transfer to ICU for BiPAP

## 2018-11-14 NOTE — ED NOTES
Assumed care of patient, report from Jt GREENE. Resting on KATINA mei. Reports improvement following breathing treatment. Mother at bedside. Verbalizes understanding of POC. Medicated per MAR. Flu swab collected and sent to lab.

## 2018-11-14 NOTE — PROGRESS NOTES
Dr. Angeles paged and updated on pt's oxygenation and breathing status. Very difficult to arouse (was like this in ED as well as when she arrived on floor) and does not stay awake very long. Appears to have apneic episodes and sleeps with her chin down against her chest, regardless of position. MD will place orders for repeat ABG's.

## 2018-11-14 NOTE — ASSESSMENT & PLAN NOTE
Denying any depressive symptoms.    Does not appear manic.  Normal affect.  Continue home abilify and lithium

## 2018-11-14 NOTE — ASSESSMENT & PLAN NOTE
Continue duo nebs as necessary  Will wean steroids since no signs of wheezing  Chronic CO2 retention of 60s and a resting serum bicarbonate of 30

## 2018-11-14 NOTE — THERAPY
"Physical Therapy Evaluation completed.   Bed Mobility:  Supine to Sit: Maximal Assist (of 2 with elevated HOB)  Transfers: Sit to Stand: Contact Guard Assist (of 2 with FWW)  Gait: able to take 2 steps before fatigued with FWW   Plan of Care: Will benefit from Physical Therapy 3 times per week  Discharge Recommendations: Equipment: Will Continue to Assess for Equipment Needs.     Pt presents with impaired activity tolerance, LE endurance and balance associated with respiratory failure. Pt needing cues throughout for reduction of valsalva, down to 88% on 3L during bed mobility but otherwise in low 90s; unable to ambulate household distances this session due to fatigue; in current condition, would recommend placement, however this is patient's first mobility and she did very well, anticipate steady progress pending medical stability.     See \"Rehab Therapy-Acute\" Patient Summary Report for complete documentation.     "

## 2018-11-14 NOTE — PROGRESS NOTES
Pt arrived on unit via RN escort. Ambulated to bed, increased SOB with ambulation. Tele monitor applied. SR on monitor. Plan of care discussed with pt. Verbalizes understanding. Bed in lowest position, locked, and alarm activated. Call light within reach.

## 2018-11-14 NOTE — ASSESSMENT & PLAN NOTE
Plan to discharge on BiPAP 12/8, tidal volume 350, FiO2 0.35, 12/min.  Physical therapy recommends skilled nursing for rehabilitation.  We feel that her strength has since improved.  We walked with her on 2l NC, and found no imbalance or difficulty ambulating with the use of a walker.  We will discarge to home once BiPAP becomes available.   Maintain sats 88-92%.  Pulmonology consult: She will likely need a sleep study in the outpatient setting to optimize BiPAP settings.  She reports having had a sleep study recently, but is unable to produce documentation and only knows that it was performed in the [facility].  We appreciate social work efforts to attempt to track down documentation of the sleep study.  PLACEMENT: BiPAP will require preauthorization.  Patient will require BiPAP for discharge.  Patient will also need home O2 for persistent hypoxia and close outpatient follow up. SNF is pending availability of a Medicaid bed.  No issues with arrhythmia, D/C Tele  PT reconsulted for inpatient therapy, recommends home health.  She does have a support system at home.

## 2018-11-14 NOTE — CARE PLAN
Problem: Safety  Goal: Will remain free from injury  Outcome: PROGRESSING AS EXPECTED  Pt remains free from injury. Bed in lowest position, locked, and alarm activated. Nonskid footwear in place. Call light and personal belongings within reach. Hourly rounding.    Problem: Venous Thromboembolism (VTW)/Deep Vein Thrombosis (DVT) Prevention:  Goal: Patient will participate in Venous Thrombosis (VTE)/Deep Vein Thrombosis (DVT)Prevention Measures  Outcome: PROGRESSING AS EXPECTED   11/13/18 2330   OTHER   Risk Assessment Score 1   VTE RISK Moderate   Pharmacologic Prophylaxis Used LMWH: Enoxaparin(Lovenox)   Pt is participating in VTE/DVT prevention measures

## 2018-11-14 NOTE — ASSESSMENT & PLAN NOTE
Likely COPD exacerbation and noncompliance with BIPAP at night.   Possibly with underlying cardiac issue.   Low well's score, and normal d-dimer.   ABG with respiratory acidosis.   - on tele --> moved to ICU for BIPAP.   - RT protocol,  - duonebs q 6 hrs,  - BIPAP   - azithromycin and prednisone.   -  Incentive spirometry  - continuous pulse ox  - BIPAP whenever napping.   - CTM

## 2018-11-14 NOTE — CONSULTS
Critical Care Consultation    Date of consult: 11/14/2018    Referring Physician  Pola Pollock M.D.    Reason for Consultation  Hypercarbic respiratory failure and somnolence.     History of Presenting Illness  43 y.o. female who presented 11/13/2018 with worsening progressive shortness of breath since moving from [place] 2 weeks ago.  Denies any chest pain cough runny nose sore throat.  Denies any new medication or drug use.  Had worsening dyspnea went to her primary care doctor was found to have a saturation of 70% sent to the ER was found to have a saturation of 78% on room air was then admitted to the floor after stabilization after on a couple liters nasal cannula for COPD exacerbation.  Patient overnight was found to be more somnolent and ABG was performed showed a pH of 7.23 PCO2 of 77 and a PO2 of 79.  Patient was transferred to the ICU for nasal BiPAP.  When I met the patient patient awakes easily however does fall asleep.  Answers all questions appropriately denies headache numbness tingling or weakness or vision changes.  Denies nausea vomiting diarrhea sick contacts.  Denies worsening swelling or edema lower extremities.  Later the mother was able to bring medical records and provide further history her BiPAP/CPAP machine was left in [place] which she recently has a diagnosis of obstructive sleep apnea with horrible snoring. She has been also admitted multiple times for similar events with saturations 83-84% and treated for possible pneumonia in the last year. She also had a history of tobacco abuse and quit some time ago.     ON EVENTS:   Placed on Bipap nasal this am.  Was able to transition to 3l oxy mask  Awake following commands      Code Status  Full Code    Review of Systems  Review of Systems   Constitutional: Negative for chills and fever.   HENT: Negative for congestion, hearing loss and sinus pain.    Eyes: Negative for pain.   Respiratory: Positive for cough and shortness of breath.  Negative for chest tightness and wheezing.    Cardiovascular: Negative for chest pain, palpitations and leg swelling.   Gastrointestinal: Negative for abdominal distention, abdominal pain, nausea and vomiting.   Genitourinary: Negative for dysuria.   Skin: Negative for rash.   Neurological: Negative for dizziness, weakness, numbness and headaches.   Psychiatric/Behavioral: Negative for agitation, behavioral problems and self-injury.       Past Medical History   has a past medical history of Bipolar 2 disorder (HCC); Other psychological stress; and Schizophrenic disorder (HCC). She also has no past medical history of ASTHMA; CAD (coronary artery disease); Cancer (HCC); Congestive heart failure (HCC); COPD; Diabetes; Hypertension; Infectious disease; Liver disease; Renal disorder; Seizure disorder (HCC); or Stroke (HCC).    Surgical History   has a past surgical history that includes hysterectomy laparoscopy and colectomy.    Family History  Not related to acute presentation of critical illness    Social History   reports that she has been smoking Cigarettes.  She has been smoking about 0.50 packs per day. She does not have any smokeless tobacco history on file. She reports that she does not drink alcohol or use drugs.    Medications  Home Medications     Reviewed by Robbie Mcdonlad (Pharmacy Tech) on 11/13/18 at 2006  Med List Status: Complete   Medication Last Dose Status   albuterol 108 (90 Base) MCG/ACT Aero Soln inhalation aerosol unknown Active   aripiprazole (ABILIFY) 30 MG tablet 11/13/2018 Active   benztropine (COGENTIN) 1 MG Tab 11/13/2018 Active   doxepin (SINEQUAN) 25 MG Cap 11/12/2018 Active   doxycycline (VIBRAMYCIN) 100 MG Tab unknown Active   Fluticasone Furoate-Vilanterol (BREO ELLIPTA) 100-25 MCG/INH AEROSOL POWDER, BREATH ACTIVATED unknwon Active   ibuprofen (MOTRIN) 800 MG Tab unknown Active   lithium (ESKALITH) 300 MG Tab 11/13/2018 Active              Current Facility-Administered Medications    Medication Dose Route Frequency Provider Last Rate Last Dose   • nystatin (MYCOSTATIN) powder   Topical BID Timothy Hennessy M.D.       • albuterol inhaler 2 Puff  2 Puff Inhalation BID David Abdi M.D.       • ipratropium-albuterol (DUONEB) nebulizer solution  3 mL Nebulization Q6HRS PRN (RT) David Abdi M.D.       • [START ON 11/15/2018] predniSONE (DELTASONE) tablet 40 mg  40 mg Oral DAILY David Abdi M.D.       • senna-docusate (PERICOLACE or SENOKOT S) 8.6-50 MG per tablet 2 Tab  2 Tab Oral BID Love Angeles M.D.   2 Tab at 11/14/18 0913    And   • polyethylene glycol/lytes (MIRALAX) PACKET 1 Packet  1 Packet Oral QDAY PRN Lvoe Angeles M.D.        And   • magnesium hydroxide (MILK OF MAGNESIA) suspension 30 mL  30 mL Oral QDAY PRN Love Angeles M.D.        And   • bisacodyl (DULCOLAX) suppository 10 mg  10 mg Rectal QDAY PRN Love Angeles M.D.       • Respiratory Care per Protocol   Nebulization Continuous RT Love Angeles M.D.       • enoxaparin (LOVENOX) inj 40 mg  40 mg Subcutaneous DAILY Love Angeles M.D.   40 mg at 11/14/18 0914   • labetalol (NORMODYNE,TRANDATE) injection 10 mg  10 mg Intravenous Q4HRS PRN Love Angeles M.D.       • acetaminophen (TYLENOL) tablet 650 mg  650 mg Oral Q6HRS PRN Love Angeles M.D.       • azithromycin (ZITHROMAX) tablet 250 mg  250 mg Oral DAILY Love Angeles M.D.   250 mg at 11/14/18 0912   • ARIPiprazole (ABILIFY) tablet 30 mg  30 mg Oral QAM Love Angeles M.D.   30 mg at 11/14/18 0910   • benztropine (COGENTIN) tablet 1 mg  1 mg Oral QAM Love M. Usera, M.D.   1 mg at 11/14/18 1052   • doxepin (SINEQUAN) capsule 25 mg  25 mg Oral QHS Love Angeles M.D.   25 mg at 11/13/18 2352   • budesonide-formoterol (SYMBICORT) 160-4.5 MCG/ACT inhaler 2 Puff  2 Puff Inhalation BID (RT) Yeison Salvador M.D.   2 Puff at 11/14/18 1056   • lithium (ESKALITH) tablet 300 mg  300 mg Oral SONIA Salvador M.D.   300 mg at  11/14/18 1054    And   • lithium (ESKALITH) tablet 600 mg  600 mg Oral QHS Yeison Salvador M.D.   600 mg at 11/13/18 6379       Allergies  Allergies   Allergen Reactions   • Amoxicillin Rash       Vital Signs last 24 hours  Temp:  [36.1 °C (96.9 °F)-37 °C (98.6 °F)] 36.1 °C (96.9 °F)  Pulse:  [59-88] 68  Resp:  [10-24] 15  BP: ()/(43-83) 122/65    Physical Exam  Physical Exam   Constitutional: She is oriented to person, place, and time. She appears well-developed and well-nourished.   Morbid obese female no acute distress   HENT:   Head: Normocephalic and atraumatic.   Eyes: Pupils are equal, round, and reactive to light. Conjunctivae and EOM are normal. Right eye exhibits no discharge. Left eye exhibits no discharge. No scleral icterus.   Neck: Normal range of motion. Neck supple.   Difficult to appreciate any JVD   Cardiovascular: Normal rate, regular rhythm and normal heart sounds.  Exam reveals no friction rub.    No murmur heard.  Pulmonary/Chest: Effort normal and breath sounds normal. No respiratory distress. She has no wheezes. She has no rales.   Abdominal: Soft.   Obese lower abdomen into the inguinal area with mild redness and chronic appearing edema   Musculoskeletal: She exhibits edema.   Neurological: She is alert and oriented to person, place, and time. She displays normal reflexes. No cranial nerve deficit. She exhibits normal muscle tone. Coordination normal.   Skin: Skin is warm and dry.   Anterior aspect of bilateral lower shins with mild erythema   Psychiatric: Her behavior is normal.   Mild flat affect       Fluids    Intake/Output Summary (Last 24 hours) at 11/14/18 1507  Last data filed at 11/14/18 1200   Gross per 24 hour   Intake              560 ml   Output              150 ml   Net              410 ml       Laboratory  Recent Results (from the past 48 hour(s))   EKG    Collection Time: 11/13/18  4:27 PM   Result Value Ref Range    Report       [facility] Emergency  Dept.    Test Date:  2018  Pt Name:    ADELINA MILLAN                Department: ER  MRN:        [id number]                      Room:  Gender:     Female                       Technician: [id number]  :        1975                   Requested By:ER TRIAGE PROTOCOL  Order #:    [id number]                    Reading MD:    Measurements  Intervals                                Axis  Rate:       64                           P:          34  UT:         151                          QRS:        54  QRSD:       88                           T:          9  QT:         400  QTc:        413    Interpretive Statements  SINUS RHYTHM  BORDERLINE LOW VOLTAGE IN FRONTAL LEADS  No previous ECG available for comparison     Influenza By PCR, A/B    Collection Time: 18  6:22 PM   Result Value Ref Range    Influenza virus A RNA Negative Negative    Influenza virus B, PCR Negative Negative   TSH (Thyroid Stimulating Hormone)    Collection Time: 18  7:07 PM   Result Value Ref Range    TSH 3.800 0.380 - 5.330 uIU/mL   Hemoglobin A1c    Collection Time: 18  7:07 PM   Result Value Ref Range    Glycohemoglobin 6.1 (H) 0.0 - 5.6 %    Est Avg Glucose 128 mg/dL   D-Dimer    Collection Time: 18  7:07 PM   Result Value Ref Range    D-Dimer Screen <0.40 0.00 - 0.50 ug/mL (FEU)   CBC WITH DIFFERENTIAL    Collection Time: 18  7:14 PM   Result Value Ref Range    WBC 12.0 (H) 4.8 - 10.8 K/uL    RBC 4.21 4.20 - 5.40 M/uL    Hemoglobin 12.2 12.0 - 16.0 g/dL    Hematocrit 40.6 37.0 - 47.0 %    MCV 96.4 81.4 - 97.8 fL    MCH 29.0 27.0 - 33.0 pg    MCHC 30.0 (L) 33.6 - 35.0 g/dL    RDW 51.7 (H) 35.9 - 50.0 fL    Platelet Count 227 164 - 446 K/uL    MPV 11.5 9.0 - 12.9 fL    Neutrophils-Polys 79.60 (H) 44.00 - 72.00 %    Lymphocytes 13.00 (L) 22.00 - 41.00 %    Monocytes 5.30 0.00 - 13.40 %    Eosinophils 1.50 0.00 - 6.90 %    Basophils 0.20 0.00 - 1.80 %    Immature Granulocytes 0.40 0.00 - 0.90 %    Nucleated RBC 0.00  /100 WBC    Neutrophils (Absolute) 9.52 (H) 2.00 - 7.15 K/uL    Lymphs (Absolute) 1.56 1.00 - 4.80 K/uL    Monos (Absolute) 0.64 0.00 - 0.85 K/uL    Eos (Absolute) 0.18 0.00 - 0.51 K/uL    Baso (Absolute) 0.02 0.00 - 0.12 K/uL    Immature Granulocytes (abs) 0.05 0.00 - 0.11 K/uL    NRBC (Absolute) 0.00 K/uL   COMP METABOLIC PANEL    Collection Time: 11/13/18  7:14 PM   Result Value Ref Range    Sodium 139 135 - 145 mmol/L    Potassium 4.2 3.6 - 5.5 mmol/L    Chloride 103 96 - 112 mmol/L    Co2 33 20 - 33 mmol/L    Anion Gap 3.0 0.0 - 11.9    Glucose 84 65 - 99 mg/dL    Bun 11 8 - 22 mg/dL    Creatinine 0.73 0.50 - 1.40 mg/dL    Calcium 9.3 8.5 - 10.5 mg/dL    AST(SGOT) 6 (L) 12 - 45 U/L    ALT(SGPT) 8 2 - 50 U/L    Alkaline Phosphatase 68 30 - 99 U/L    Total Bilirubin 1.0 0.1 - 1.5 mg/dL    Albumin 3.9 3.2 - 4.9 g/dL    Total Protein 6.6 6.0 - 8.2 g/dL    Globulin 2.7 1.9 - 3.5 g/dL    A-G Ratio 1.4 g/dL   Troponin STAT    Collection Time: 11/13/18  7:14 PM   Result Value Ref Range    Troponin I 0.01 0.00 - 0.04 ng/mL   Btype Natriuretic Peptide    Collection Time: 11/13/18  7:14 PM   Result Value Ref Range    B Natriuretic Peptide 45 0 - 100 pg/mL   HCG Qual Serum    Collection Time: 11/13/18  7:14 PM   Result Value Ref Range    Beta-Hcg Qualitative Serum Negative Negative   ESTIMATED GFR    Collection Time: 11/13/18  7:14 PM   Result Value Ref Range    GFR If African American >60 >60 mL/min/1.73 m 2    GFR If Non African American >60 >60 mL/min/1.73 m 2   ARTERIAL BLOOD GAS w/ O2 (LAB)    Collection Time: 11/13/18  9:38 PM   Result Value Ref Range    Ph 7.28 (LL) 7.40 - 7.50    Pco2 66.8 (HH) 26.0 - 37.0 mmHg    Po2 75.4 64.0 - 87.0 mmHg    O2 Saturation 92.6 (L) 93.0 - 99.0 %    Hco3 31 (H) 17 - 25 mmol/L    Base Excess 2 -4 - 3 mmol/L    Body Temp see below Centigrade   URINALYSIS CULTURE, IF INDICATED    Collection Time: 11/13/18  9:45 PM   Result Value Ref Range    Color Yellow     Character Clear      Specific Gravity 1.019 <1.035    Ph 6.0 5.0 - 8.0    Glucose Negative Negative mg/dL    Ketones Negative Negative mg/dL    Protein 100 (A) Negative mg/dL    Bilirubin Negative Negative    Urobilinogen, Urine 0.2 Negative    Nitrite Negative Negative    Leukocyte Esterase Negative Negative    Occult Blood Negative Negative    Micro Urine Req Microscopic    URINE DRUG SCREEN    Collection Time: 18  9:45 PM   Result Value Ref Range    Amphetamines Urine Negative Negative    Barbiturates Negative Negative    Benzodiazepines Negative Negative    Cocaine Metabolite Negative Negative    Methadone Negative Negative    Opiates Negative Negative    Oxycodone Negative Negative    Phencyclidine -Pcp Negative Negative    Propoxyphene Negative Negative    Cannabinoid Metab Negative Negative   URINE MICROSCOPIC (W/UA)    Collection Time: 18  9:45 PM   Result Value Ref Range    WBC 5-10 (A) /hpf    RBC 2-5 (A) /hpf    Bacteria Few (A) None /hpf    Epithelial Cells Moderate (A) /hpf    Hyaline Cast 6-10 (A) /lpf   Procalcitonin    Collection Time: 18 10:30 PM   Result Value Ref Range    Procalcitonin 0.11 <0.25 ng/mL   MAGNESIUM    Collection Time: 18 10:30 PM   Result Value Ref Range    Magnesium 2.4 1.5 - 2.5 mg/dL   EKG    Collection Time: 18 11:32 PM   Result Value Ref Range    Report       [facility]    Test Date:  2018  Pt Name:    ADELINA MILLAN                Department: ER  MRN:        [id number]                      Room:       [facility]  Gender:     Female                       Technician: MONA  :        1975                   Requested By:NIEVES ROSE  Order #:    [id number]                    Shar MD: Adelaida Posaad MD    Measurements  Intervals                                Axis  Rate:       67                           P:          38  WV:         144                          QRS:        69  QRSD:       88                           T:          11  QT:          400  QTc:        423    Interpretive Statements  SINUS RHYTHM  CONSIDER RIGHT ATRIAL ABNORMALITY  BORDERLINE LOW VOLTAGE IN FRONTAL LEADS  BORDERLINE T ABNORMALITIES, INFERIOR LEADS  Compared to ECG 11/13/2018 16:27:09  NO SIGNIFICANT CHANGE  Electronically Signed On 11- 10:39:36 PST by Adelaida Posada MD     Basic Metabolic Panel (BMP)    Collection Time: 11/14/18  2:08 AM   Result Value Ref Range    Sodium 136 135 - 145 mmol/L    Potassium 4.9 3.6 - 5.5 mmol/L    Chloride 102 96 - 112 mmol/L    Co2 31 20 - 33 mmol/L    Glucose 154 (H) 65 - 99 mg/dL    Bun 12 8 - 22 mg/dL    Creatinine 0.69 0.50 - 1.40 mg/dL    Calcium 9.2 8.5 - 10.5 mg/dL    Anion Gap 3.0 0.0 - 11.9   CBC with Differential    Collection Time: 11/14/18  2:08 AM   Result Value Ref Range    WBC 11.5 (H) 4.8 - 10.8 K/uL    RBC 4.29 4.20 - 5.40 M/uL    Hemoglobin 12.3 12.0 - 16.0 g/dL    Hematocrit 41.4 37.0 - 47.0 %    MCV 96.5 81.4 - 97.8 fL    MCH 28.7 27.0 - 33.0 pg    MCHC 29.7 (L) 33.6 - 35.0 g/dL    RDW 51.8 (H) 35.9 - 50.0 fL    Platelet Count 215 164 - 446 K/uL    MPV 11.4 9.0 - 12.9 fL    Nucleated RBC 0.00 /100 WBC    NRBC (Absolute) 0.00 K/uL    Neutrophils-Polys 94.40 (H) 44.00 - 72.00 %    Lymphocytes 4.20 (L) 22.00 - 41.00 %    Monocytes 0.50 0.00 - 13.40 %    Eosinophils 0.00 0.00 - 6.90 %    Basophils 0.20 0.00 - 1.80 %    Immature Granulocytes 0.70 0.00 - 0.90 %    Lymphs (Absolute) 0.48 (L) 1.00 - 4.80 K/uL    Monos (Absolute) 0.06 0.00 - 0.85 K/uL    Eos (Absolute) 0.00 0.00 - 0.51 K/uL    Baso (Absolute) 0.02 0.00 - 0.12 K/uL    Immature Granulocytes (abs) 0.08 0.00 - 0.11 K/uL    Neutrophils (Absolute) 10.81 (H) 2.00 - 7.15 K/uL    Hypochromia 1+     Anisocytosis 1+     Macrocytosis 1+     Microcytosis 1+    Lipid Profile (Lipid Panel) Fasting    Collection Time: 11/14/18  2:08 AM   Result Value Ref Range    Cholesterol,Tot 138 100 - 199 mg/dL    Triglycerides 106 0 - 149 mg/dL    HDL 41 >=40 mg/dL    LDL 76 <100 mg/dL    LITHIUM    Collection Time: 11/14/18  2:08 AM   Result Value Ref Range    Lithium 0.9 0.6 - 1.2 mmol/L   ESTIMATED GFR    Collection Time: 11/14/18  2:08 AM   Result Value Ref Range    GFR If African American >60 >60 mL/min/1.73 m 2    GFR If Non African American >60 >60 mL/min/1.73 m 2   PERIPHERAL SMEAR REVIEW    Collection Time: 11/14/18  2:08 AM   Result Value Ref Range    Peripheral Smear Review see below    PLATELET ESTIMATE    Collection Time: 11/14/18  2:08 AM   Result Value Ref Range    Plt Estimation Normal    MORPHOLOGY    Collection Time: 11/14/18  2:08 AM   Result Value Ref Range    RBC Morphology Present     Large Platelets 1+     Polychromia 1+     Poikilocytosis 1+     Ovalocytes 1+     Schistocytes 1+     Tear Drop Cells 1+     Basophilic Stippling Few    DIFFERENTIAL COMMENT    Collection Time: 11/14/18  2:08 AM   Result Value Ref Range    Comments-Diff see below    ABG - LAB    Collection Time: 11/14/18  4:42 AM   Result Value Ref Range    Ph 7.23 (LL) 7.40 - 7.50    Pco2 77.1 (HH) 26.0 - 37.0 mmHg    Po2 79.5 64.0 - 87.0 mmHg    O2 Saturation 93.1 93.0 - 99.0 %    Hco3 32 (H) 17 - 25 mmol/L    Base Excess 2 -4 - 3 mmol/L    Body Temp see below Centigrade   EC-ECHOCARDIOGRAM COMPLETE W/ CONT    Collection Time: 11/14/18 11:03 AM   Result Value Ref Range    Left Ventrical Ejection Fraction 65    VENOUS BLOOD GAS    Collection Time: 11/14/18 11:45 AM   Result Value Ref Range    Venous Bg Ph 7.35 7.31 - 7.45    Venous Bg Pco2 49.8 41.0 - 51.0 mmHg    Venous Bg Po2 96.8 (H) 25.0 - 40.0 mmHg    Venous Bg O2 Saturation 97.2 %    Venous Bg Hco3 27 24 - 28 mmol/L    Venous Bg Base Excess 1 mmol/L    Body Temp see below Centigrade   PHOSPHORUS    Collection Time: 11/14/18 12:26 PM   Result Value Ref Range    Phosphorus 3.4 2.5 - 4.5 mg/dL       Imaging  EC-ECHOCARDIOGRAM COMPLETE W/ CONT   Final Result      DX-CHEST-PORTABLE (1 VIEW)   Final Result      Cardiomegaly is increased compared to 2006.  Correlation with echocardiogram is recommended. Pericardial effusion not excluded.      Interstitial prominence likely represents interstitial edema.             Assessment/Plan  * Acute hypoxemic respiratory failure (HCC)- (present on admission)   Assessment & Plan    Related to known diagnosis of obstructive sleep apnea in addition to obesity hypoventilation syndrome  Continue to use BiPAP and titrate settings for naps and at night  The mother will get the BiPAP machine and bring it to the hospital  Chronic hypoxemia requiring oxygen titrate saturations to 88-92% to help with VQ match  Continue 5-day course of azithromycin clinically not significant for pneumonia  Follow-up on echocardiogram to evaluate right ventricular pressures  D-dimer unremarkable and history not consistent with pulmonary emboli    Follow-up on repeat VBG     COPD (chronic obstructive pulmonary disease) (HCC)- (present on admission)   Assessment & Plan    Continue duo nebs as necessary  Will wean steroids since no signs of wheezing  Chronic CO2 retention of 60s and a resting serum bicarbonate of 30     Cardiomegaly   Assessment & Plan    Echo was difficult formal study however EF is normal possible aortic stenosis and RVSP of 60  Would recommend diuresis gentle in the face of possible aortic stenosis     Class 3 severe obesity due to excess calories with body mass index (BMI) of 50.0 to 59.9 in adult (HCC)   Assessment & Plan    Severe obesity possible related to psychiatric medications and KATHIA  Recommend diet modification and outpatient referral for weight reductive surgery     Bipolar affective disorder (HCC)   Assessment & Plan    Continue home medications with lithium doxepin and Cogentin  Per notes from outside records patient also has a history of schizophrenia         Discussed patient condition and risk of morbidity and/or mortality with Family, RN, RT, Pharmacy, [employer] resident, Charge nurse / hot rounds and Patient.      The  patient remains critically ill hypoxic hypercarbic respiratory failure and altered mental status requiring nasal BiPAP therapy and active titration for possible possible need of intubation.  Critical care time = 65 minutes in directly providing and coordinating critical care and extensive data review.  No time overlap and excludes procedures.

## 2018-11-14 NOTE — ED TRIAGE NOTES
Pt to triage .  Chief Complaint   Patient presents with   • Shortness of Breath     increasing sob x 3 wks    • Sent by MD     for low pulse ox saturation 79% ra

## 2018-11-14 NOTE — PROGRESS NOTES
Progress Note    Repeat ABG values reviewed showing worsening pH 7.23 and increased PCO2 77.1. Patient assessed at bedside, appears somnolent but awakens to sternal rub then quickly goes back to sleep. Oxygen saturating well on 3L NC. Will transfer to ICU for bipap ventilation. Discussed with ICU resident and ICU attending who will place transfer orders.

## 2018-11-14 NOTE — PROGRESS NOTES
Sulema from Lab called with critical result of Ph 7.23 and PCO2 77.1 at 0505. Critical lab result read back to Sulema.   Dr. Oro notified of critical lab result at 0510.  Critical lab result read back by  [id number] at bedside. MD will discuss with attending and update RN with plan of care. Charge RNJustice notified

## 2018-11-14 NOTE — THERAPY
"Occupational Therapy Evaluation completed.   Functional Status:  Total A LB dressing, CGA seated grooming, CGA x2 stand pivot txf w/ FWW  Plan of Care: Will benefit from Occupational Therapy 3 times per week  Discharge Recommendations:  Equipment: Will Continue to Assess for Equipment Needs. Post-acute therapy In current condition, recommend DC to subacute placement for continued inpt therapy services, however, pt shows potential to improve and DC home w/ home health    See \"Rehab Therapy-Acute\" Patient Summary Report for complete documentation.      Pt is a 44 y/o female who presents to acute 2/2 SOB and hypoxia. PMH includes COPD and morbid obesity. Pt recently moved to [place] from [place]. Pt reports she lives in 1st floor apt w/ her mother. PLOF is independent w/ BADLs, other than her mom assists w/ socks. Pt primarily limited by impaired activity tolerance impacting balance, functional mobility and functional independence. Will follow for Acute OT services while in house.   "

## 2018-11-14 NOTE — ASSESSMENT & PLAN NOTE
Echo was difficult formal study however EF is normal possible aortic stenosis and RVSP of 60  Would recommend diuresis gentle in the face of possible aortic stenosis

## 2018-11-14 NOTE — NON-PROVIDER
"[facility] ICU Progress Note      Admit Date: 11/13/2018  ICU Day: Day #1    Resident(s): Lee Ann Chauhan  Attending: RAJAT ESTRADA/ Dr. Abdi    Date & Time:   11/14/2018   7:33 AM       Patient ID:    Name:             Lorene Haro   YOB: 1975  Age:                 43 y.o.  female   MRN:               [id number]    HPI:  This is a 43-yr-old female who presented to the Emergency department from her PCP with low oxygen on room air. She was admitted for acute hypoxic respiratory failure to the floor.  A rapid response was called for worsening respiratory acidosis and increasing respiratory failure.   Patient transferred to [facility] and BiPAP therapy was initiated. PMH includes suicidal ideation in past, bipolar II disorder, schizophrenia, COPD, morbid obesity, and hysterectomy.  Current 1/2 pk/day smoker.  Recently moved back from [place], [place].  Influenza A & B negative, d-dimer and pro calcitonin wnl.      Consultants:    PMA:     Interval Events:    Review of Systems   Unable to perform ROS: Acuity of condition       PHYSICAL EXAM 11/14/2018  Vitals:    11/14/18 0600 11/14/18 0615 11/14/18 0630 11/14/18 0645   BP: 126/63 142/63 126/63 122/65   Pulse: 64 70 62 (!) 59   Resp: 15 18 12 14   Temp: 36.6 °C (97.9 °F)      SpO2:       Weight: (!) 142.5 kg (314 lb 2.5 oz)      Height:         Body mass index is 57.46 kg/m².  /65   Pulse (!) 59   Temp 36.6 °C (97.9 °F)   Resp 14   Ht 1.575 m (5' 2\")   Wt (!) 142.5 kg (314 lb 2.5 oz)   SpO2 92%   BMI 57.46 kg/m²   O2 therapy: Pulse Oximetry: 92 %, O2 (LPM): 3, O2 Delivery: Silicone Nasal Cannula    Physical Exam   Constitutional: She appears lethargic. She appears not jaundiced. No distress.   Unkept, obese female sleeping in bed in no apparent respiratory distress.    HENT:   Head: Normocephalic and atraumatic.   Eyes: Conjunctivae are normal. No scleral icterus.   Cardiovascular: Normal rate, regular rhythm and normal pulses.  Exam reveals distant " heart sounds. Exam reveals no gallop and no friction rub.    No murmur heard.  Pulmonary/Chest: No accessory muscle usage. No respiratory distress. She has no wheezes.   Abdominal: Soft. Bowel sounds are normal. There is no tenderness.   Musculoskeletal: She exhibits edema.   Neurological: She appears lethargic. She is disoriented. She displays weakness.   Skin: She is not diaphoretic. No erythema.   Bilateral lower extremity erythema. Redness in pannus.        Respiratory:     Respiration: 14, Pulse Oximetry: 92 %, O2 Daily Delivery Respiratory : Silicone Nasal Cannula    Chest Tube Drains:    Recent Labs      18   0442   [id number]  7.28*  7.23*   [id number]  66.8*  77.1*   FLACP126T  75.4  79.5   [id number]  92.6*  93.1   ARTHCO3  31*  32*   ARTBE  2  2       HemoDynamics:  Pulse: (!) 59, Heart Rate (Monitored): 64 Blood Pressure: 122/65, NIBP: 122/65      Neuro:      Fluids:        Intake/Output Summary (Last 24 hours) at 18 0733  Last data filed at 18 0000   Gross per 24 hour   Intake              120 ml   Output                0 ml   Net              120 ml       Weight: (!) 142.5 kg (314 lb 2.5 oz)  Body mass index is 57.46 kg/m².    Recent Labs      18   SODIUM  139   --   136   POTASSIUM  4.2   --   4.9   CHLORIDE  103   --   102   CO2  33   --   31   BUN  11   --   12   CREATININE  0.73   --   0.69   MAGNESIUM   --   2.4   --    CALCIUM  9.3   --   9.2       GI/Nutrition:  Recent Labs      18   ALTSGPT  8   --    ASTSGOT  6*   --    ALKPHOSPHAT  68   --    TBILIRUBIN  1.0   --    GLUCOSE  84  154*       Heme:  Recent Labs      18   RBC  4.21  4.29   HEMOGLOBIN  12.2  12.3   HEMATOCRIT  40.6  41.4   PLATELETCT  227  215       Infectious Disease:  Temp  Av.6 °C (97.8 °F)  Min: 36.2 °C (97.2 °F)  Max: 37 °C (98.6 °F)  Recent Labs      18   1914  18   0208    WBC  12.0*  11.5*   NEUTSPOLYS  79.60*  94.40*   LYMPHOCYTES  13.00*  4.20*   MONOCYTES  5.30  0.50   EOSINOPHILS  1.50  0.00   BASOPHILS  0.20  0.20   ASTSGOT  6*   --    ALTSGPT  8   --    ALKPHOSPHAT  68   --    TBILIRUBIN  1.0   --        Meds:  • nystatin       • senna-docusate  2 Tab      And   • polyethylene glycol/lytes  1 Packet      And   • magnesium hydroxide  30 mL      And   • bisacodyl  10 mg     • Respiratory Care per Protocol       • enoxaparin  40 mg     • labetalol  10 mg     • acetaminophen  650 mg     • azithromycin  250 mg     • methylPREDNISolone  40 mg     • ipratropium-albuterol  3 mL     • ARIPiprazole  30 mg     • benztropine  1 mg     • doxepin  25 mg     • budesonide-formoterol  2 Puff     • lithium  300 mg      And   • lithium  600 mg          Procedures:  11/14-Echo    Imaging:  DX-CHEST-PORTABLE (1 VIEW)   Final Result      Cardiomegaly is increased compared to 2006. Correlation with echocardiogram is recommended. Pericardial effusion not excluded.      Interstitial prominence likely represents interstitial edema.         DX-CHEST-2 VIEWS    (Results Pending)   EC-ECHOCARDIOGRAM COMPLETE W/O CONT    (Results Pending)       Problem and Plan: 11/14/2018    1)  Acute Respiratory Failure with Hypercapnia     -BiPAP   -RT protocol   -Duo nebs PRN Q6 hrs    - Solumedrol 40 mg IV q6 hrs    -Azithromycin 250 mg po qday   -Incentive spirometer   -Ddimer negative   -Influenza A & B (-)   -Hypoventilation Syndrome: BMI 57.     2) Respiratory Acidosis, CO2 retention     - BiPAP    - Recheck ABGs    3) Copd Exacerbation     -Titrate oxygen > 88%   -PFT ordered as outpatient    4) Cardiomegaly     -Seen on chest xray vs interstitial edema    -Complete Echocardiogram ordered   -BNP 45        Bipolar Disorder      -Continue home medication therapy: Abilify and Lithium   -Lithium level checked, wnl    Morbid Obesity     -Dietary consult          DISPO: ICU    CODE STATUS: Full    Quality  Measures:  Henao Catheter: No Henao  DVT Prophylaxis: Enoxaparin (Lovenox) 40 mg SQ qday  Ulcer Prophylaxis: Not Indicated  Antibiotics: Azithromax 250 mg PO qday  Lines: PIV only

## 2018-11-14 NOTE — PROGRESS NOTES
phlebotomist called to draw labs as patient is a hard stick and this RN is not able to obtain enough blood for all ordered labs. Phlebotomist will be up to draw labs.

## 2018-11-14 NOTE — SENIOR ADMIT NOTE
Senior Admit Note    Patient: Lorene Haro.  MRN: [id number].                               Chief complaint: shortness of breath, hypoxia    HPI:  In brief Ms Haro is a 43 y F w/ PMHx COPD, morbid obesity, sleep apnea?, presenting for shortness of breath, found to have low oxygen saturation at her PCP. Patient reports recently moving from [place] to [place] couple of weeks ago. Since moving she notes not feeling well, having shortness of breath which has been getting progressively worse, prompting visit with PCP. O2 saturation found to be low at PCP in 70's; in the ED pulse ox was 79% on room air, satting well with 3L via NC. She has also needed to use albuterol inhaler more often, which temporarily improves symptoms. +Mild non productive cough and chills, no fevers. Patient reports dyspnea at rest, not worse with exertion, +/- PND, no orthopnea. She reports some centrally located chest tightness with additional chest tightness in her upper back; not sharp or tearing in nature.  Denies pleuritic chest pain, hemoptysis, tachycardia. She does note some lower leg cramping and pain bilaterally with associated swelling. She claims she had a sleep study but is unsure of the results.     Objective:  Pertinent vitals/physical findings: T 97.7, HR 61, RR 16, /83, 96% O2 on 3L NC  Gen.: obese woman lying in bed, NAD.  HEENT: Normocephalic, nontraumatic.  MMM. Facial hair. Dandruff. slight papular rash on cheeks. No icterus.   CV: distant heart sounds, RRR, No m/r/g.  No JVD.  No carotid bruits.  Lungs: diffuse expiratory wheezing, short expiratory phase  Abd: soft, large pannus with intertrigo groin, no rebound, bowel sounds present.   Extremities: No lower extremity edema. Distal pulses intact.  Neuro: Alert and oriented to person, place, time, situation.    Skin: warm, dry.   Psych: appropriate mood, affect.      Labs:  WBC 12, otherwise CBC and CMP normal. Troponin 0.01, BNP 45, d dimer <0.40.   ABG: pH 7.28, PCO2 66.8,  Po2 75.4, HCO3 31    Imaging:  CXR shows cardiomegaly, interstitial prominence likely represents interstitial edema.    Assessment/plan for main hospital problem:    # Acute hypercapnic respiratory failure  # COPD exacerbation  # Respiratory acidosis, CO2 retention  # Cardiomegaly  # Bipolar disorder  # Morbid obesity    Patient saturating well from 79% RA to 96% on 3L NC. No signs of altered mentation, increased work of breathing, or impending respiratory failure.  ABG shows respiratory acidosis, suspect acute on chronic hypercapnic respiratory failure due to COPD exacerbation. Suspect chronic CO2 retainer, as well as contribution from obesity hypoventilation.  As patient appears comfortable, will admit to telemetry with close monitoring. Will repeat ABG in a few hours after initial therapies, including duonebs, solumedrol, azithromycin, respiratory therapy.  Low Well's score, d dimer negative, ruling out PE.  No signs of acute tamponade or clinical signs of heart failure. Will order echocardiogram to assess cardiomegaly and heart morphology.  Continue home bipolar meds, check lithium level.      DVT prophylaxis: lovenox  Code status: FULL    For complete details, please refer to H&P by intern, Dr. Angeles.     Yeison Salvador M.D.

## 2018-11-14 NOTE — ASSESSMENT & PLAN NOTE
Continue home medications with lithium doxepin and Cogentin  Per notes from outside records patient also has a history of schizophrenia

## 2018-11-14 NOTE — PROGRESS NOTES
2 RN skin assessment performed by JC Smith and JC Mares. Bilat breast pink, moist, intact. Pannus, bilat groin very moist, red, intact. Scattered scabs on back. Venous stasis on bilat lower extremities, very ededemous. Bilat feet dry, calloused. No other skin breakdown present on admission.

## 2018-11-14 NOTE — DIETARY
"Nutrition Services: High BMI    Pt is a 43 y.o. Female with Dx: Acute hypoxemic respiratory failure (HCC)    Ht: 62\", Wt: 142.5 kg, BMI= 57.46  Pt with BMI >40; formal weight loss counseling not appropriate in acute care setting.    Recommend outpatient services for weight management after D/c.   Consider [id number] kcal/day restriction (on regular diet).   "

## 2018-11-14 NOTE — CARE PLAN
Problem: Venous Thromboembolism (VTW)/Deep Vein Thrombosis (DVT) Prevention:  Goal: Patient will participate in Venous Thrombosis (VTE)/Deep Vein Thrombosis (DVT)Prevention Measures    Intervention: Encourage ambulation/mobilization at level directed by Physical Therapy in collaboration with Interdisciplinary Team  Pt worked with patient to get her up to chair; RN and CNA got her back to bed.       Problem: Respiratory:  Goal: Respiratory status will improve    Intervention: Administer and titrate oxygen therapy  Patient on mask while awake and nasal cpap while sleeping.

## 2018-11-15 ENCOUNTER — APPOINTMENT (OUTPATIENT)
Dept: RADIOLOGY | Facility: MEDICAL CENTER | Age: 43
DRG: 189 | End: 2018-11-15
Attending: STUDENT IN AN ORGANIZED HEALTH CARE EDUCATION/TRAINING PROGRAM
Payer: MEDICAID

## 2018-11-15 PROBLEM — G47.33 OSA AND COPD OVERLAP SYNDROME (HCC): Status: ACTIVE | Noted: 2018-11-15

## 2018-11-15 PROBLEM — J44.9 OSA AND COPD OVERLAP SYNDROME (HCC): Status: ACTIVE | Noted: 2018-11-15

## 2018-11-15 LAB
ALBUMIN SERPL BCP-MCNC: 3.9 G/DL (ref 3.2–4.9)
ALBUMIN/GLOB SERPL: 1.4 G/DL
ALP SERPL-CCNC: 61 U/L (ref 30–99)
ALT SERPL-CCNC: 9 U/L (ref 2–50)
ANION GAP SERPL CALC-SCNC: 5 MMOL/L (ref 0–11.9)
AST SERPL-CCNC: 7 U/L (ref 12–45)
BASE EXCESS BLDV CALC-SCNC: 6 MMOL/L
BASOPHILS # BLD AUTO: 0.1 % (ref 0–1.8)
BASOPHILS # BLD: 0.01 K/UL (ref 0–0.12)
BILIRUB SERPL-MCNC: 0.7 MG/DL (ref 0.1–1.5)
BODY TEMPERATURE: ABNORMAL CENTIGRADE
BUN SERPL-MCNC: 19 MG/DL (ref 8–22)
CALCIUM SERPL-MCNC: 9.9 MG/DL (ref 8.5–10.5)
CHLORIDE SERPL-SCNC: 101 MMOL/L (ref 96–112)
CO2 SERPL-SCNC: 32 MMOL/L (ref 20–33)
CREAT SERPL-MCNC: 0.73 MG/DL (ref 0.5–1.4)
EOSINOPHIL # BLD AUTO: 0 K/UL (ref 0–0.51)
EOSINOPHIL NFR BLD: 0 % (ref 0–6.9)
ERYTHROCYTE [DISTWIDTH] IN BLOOD BY AUTOMATED COUNT: 52.6 FL (ref 35.9–50)
GLOBULIN SER CALC-MCNC: 2.7 G/DL (ref 1.9–3.5)
GLUCOSE SERPL-MCNC: 114 MG/DL (ref 65–99)
HCO3 BLDV-SCNC: 35 MMOL/L (ref 24–28)
HCT VFR BLD AUTO: 42.1 % (ref 37–47)
HGB BLD-MCNC: 12 G/DL (ref 12–16)
IMM GRANULOCYTES # BLD AUTO: 0.07 K/UL (ref 0–0.11)
IMM GRANULOCYTES NFR BLD AUTO: 0.6 % (ref 0–0.9)
LYMPHOCYTES # BLD AUTO: 0.77 K/UL (ref 1–4.8)
LYMPHOCYTES NFR BLD: 6.2 % (ref 22–41)
MCH RBC QN AUTO: 28 PG (ref 27–33)
MCHC RBC AUTO-ENTMCNC: 28.5 G/DL (ref 33.6–35)
MCV RBC AUTO: 98.4 FL (ref 81.4–97.8)
MONOCYTES # BLD AUTO: 0.62 K/UL (ref 0–0.85)
MONOCYTES NFR BLD AUTO: 5 % (ref 0–13.4)
NEUTROPHILS # BLD AUTO: 10.87 K/UL (ref 2–7.15)
NEUTROPHILS NFR BLD: 88.1 % (ref 44–72)
NRBC # BLD AUTO: 0 K/UL
NRBC BLD-RTO: 0 /100 WBC
PCO2 BLDV: 72.1 MMHG (ref 41–51)
PH BLDV: 7.3 [PH] (ref 7.31–7.45)
PLATELET # BLD AUTO: 221 K/UL (ref 164–446)
PMV BLD AUTO: 11.6 FL (ref 9–12.9)
PO2 BLDV: 41.2 MMHG (ref 25–40)
POTASSIUM SERPL-SCNC: 4.9 MMOL/L (ref 3.6–5.5)
PROT SERPL-MCNC: 6.6 G/DL (ref 6–8.2)
RBC # BLD AUTO: 4.28 M/UL (ref 4.2–5.4)
SAO2 % BLDV: 70.9 %
SODIUM SERPL-SCNC: 138 MMOL/L (ref 135–145)
WBC # BLD AUTO: 12.3 K/UL (ref 4.8–10.8)

## 2018-11-15 PROCEDURE — 700102 HCHG RX REV CODE 250 W/ 637 OVERRIDE(OP): Performed by: STUDENT IN AN ORGANIZED HEALTH CARE EDUCATION/TRAINING PROGRAM

## 2018-11-15 PROCEDURE — 94003 VENT MGMT INPAT SUBQ DAY: CPT

## 2018-11-15 PROCEDURE — 82803 BLOOD GASES ANY COMBINATION: CPT

## 2018-11-15 PROCEDURE — 99291 CRITICAL CARE FIRST HOUR: CPT | Performed by: INTERNAL MEDICINE

## 2018-11-15 PROCEDURE — 85025 COMPLETE CBC W/AUTO DIFF WBC: CPT

## 2018-11-15 PROCEDURE — 80053 COMPREHEN METABOLIC PANEL: CPT

## 2018-11-15 PROCEDURE — 770020 HCHG ROOM/CARE - TELE (206)

## 2018-11-15 PROCEDURE — A9270 NON-COVERED ITEM OR SERVICE: HCPCS | Performed by: INTERNAL MEDICINE

## 2018-11-15 PROCEDURE — A9270 NON-COVERED ITEM OR SERVICE: HCPCS | Performed by: STUDENT IN AN ORGANIZED HEALTH CARE EDUCATION/TRAINING PROGRAM

## 2018-11-15 PROCEDURE — 700111 HCHG RX REV CODE 636 W/ 250 OVERRIDE (IP): Performed by: INTERNAL MEDICINE

## 2018-11-15 PROCEDURE — 71045 X-RAY EXAM CHEST 1 VIEW: CPT

## 2018-11-15 PROCEDURE — 700111 HCHG RX REV CODE 636 W/ 250 OVERRIDE (IP): Performed by: STUDENT IN AN ORGANIZED HEALTH CARE EDUCATION/TRAINING PROGRAM

## 2018-11-15 PROCEDURE — 5A09357 ASSISTANCE WITH RESPIRATORY VENTILATION, LESS THAN 24 CONSECUTIVE HOURS, CONTINUOUS POSITIVE AIRWAY PRESSURE: ICD-10-PCS | Performed by: HOSPITALIST

## 2018-11-15 PROCEDURE — 700102 HCHG RX REV CODE 250 W/ 637 OVERRIDE(OP): Performed by: INTERNAL MEDICINE

## 2018-11-15 RX ORDER — BUDESONIDE AND FORMOTEROL FUMARATE DIHYDRATE 160; 4.5 UG/1; UG/1
2 AEROSOL RESPIRATORY (INHALATION) 2 TIMES DAILY
Status: DISCONTINUED | OUTPATIENT
Start: 2018-11-15 | End: 2018-11-21 | Stop reason: HOSPADM

## 2018-11-15 RX ORDER — FUROSEMIDE 10 MG/ML
20 INJECTION INTRAMUSCULAR; INTRAVENOUS ONCE
Status: COMPLETED | OUTPATIENT
Start: 2018-11-15 | End: 2018-11-15

## 2018-11-15 RX ADMIN — LITHIUM CARBONATE 300 MG: 300 TABLET ORAL at 04:50

## 2018-11-15 RX ADMIN — PREDNISONE 40 MG: 20 TABLET ORAL at 04:50

## 2018-11-15 RX ADMIN — ALBUTEROL SULFATE 2 PUFF: 90 AEROSOL, METERED RESPIRATORY (INHALATION) at 06:00

## 2018-11-15 RX ADMIN — FUROSEMIDE 20 MG: 10 INJECTION, SOLUTION INTRAMUSCULAR; INTRAVENOUS at 12:21

## 2018-11-15 RX ADMIN — AZITHROMYCIN MONOHYDRATE 250 MG: 250 TABLET ORAL at 04:50

## 2018-11-15 RX ADMIN — NYSTATIN: 100000 POWDER TOPICAL at 04:51

## 2018-11-15 RX ADMIN — NYSTATIN: 100000 POWDER TOPICAL at 17:00

## 2018-11-15 RX ADMIN — LITHIUM CARBONATE 600 MG: 300 TABLET ORAL at 20:47

## 2018-11-15 RX ADMIN — BUDESONIDE AND FORMOTEROL FUMARATE DIHYDRATE 2 PUFF: 160; 4.5 AEROSOL RESPIRATORY (INHALATION) at 12:23

## 2018-11-15 RX ADMIN — BENZTROPINE MESYLATE 1 MG: 1 TABLET ORAL at 04:51

## 2018-11-15 RX ADMIN — ENOXAPARIN SODIUM 40 MG: 100 INJECTION SUBCUTANEOUS at 04:50

## 2018-11-15 RX ADMIN — BUDESONIDE AND FORMOTEROL FUMARATE DIHYDRATE 2 PUFF: 160; 4.5 AEROSOL RESPIRATORY (INHALATION) at 17:00

## 2018-11-15 RX ADMIN — ALBUTEROL SULFATE 2 PUFF: 90 AEROSOL, METERED RESPIRATORY (INHALATION) at 17:00

## 2018-11-15 RX ADMIN — ARIPIPRAZOLE 30 MG: 10 TABLET ORAL at 04:50

## 2018-11-15 RX ADMIN — STANDARDIZED SENNA CONCENTRATE AND DOCUSATE SODIUM 2 TABLET: 8.6; 5 TABLET, FILM COATED ORAL at 04:50

## 2018-11-15 RX ADMIN — ACETAMINOPHEN 650 MG: 325 TABLET, FILM COATED ORAL at 20:48

## 2018-11-15 ASSESSMENT — PULMONARY FUNCTION TESTS
EPAP_CMH2O: 8

## 2018-11-15 ASSESSMENT — ENCOUNTER SYMPTOMS
ABDOMINAL PAIN: 0
HEADACHES: 1
VOMITING: 0
STRIDOR: 0
FOCAL WEAKNESS: 0
SINUS PAIN: 0
COUGH: 1
BRUISES/BLEEDS EASILY: 0
CHILLS: 0
SHORTNESS OF BREATH: 1
SEIZURES: 0
SPUTUM PRODUCTION: 0
HEMOPTYSIS: 0
DIARRHEA: 0
CONSTIPATION: 0
NAUSEA: 0
ORTHOPNEA: 1
FALLS: 0
WEAKNESS: 0
FEVER: 0
MYALGIAS: 0
DOUBLE VISION: 0
EYE PAIN: 0
SHORTNESS OF BREATH: 0

## 2018-11-15 ASSESSMENT — PAIN SCALES - GENERAL
PAINLEVEL_OUTOF10: 0
PAINLEVEL_OUTOF10: 3
PAINLEVEL_OUTOF10: 0
PAINLEVEL_OUTOF10: 3

## 2018-11-15 NOTE — PROGRESS NOTES
[facility] ICU Progress Note      Admit Date: 11/13/2018  ICU Day:  2    Resident(s): Martinez Pollock  Attending: RAJAT ESTRADA/ Dr. Montoya    Date & Time:   11/15/2018   3:26 PM       Patient ID:    Name:             Lorene Haro   YOB: 1975  Age:                 43 y.o.  female   MRN:               [id number]    HPI:  43 y.o. Female, with Hx COPD and KATHIA, schizophrenia and bipolar.  She apparently has previously had a sleep study done in [place], and is supposed to have been using a BiPAP.  However, there is conflicting information about this.  The patient currently states that she has never received the BiPAP.  However, other physician state that they have spoken with family members who state that she has left it in [place], when she moved to [place] 2 or 3 weeks ago.  Either way, she has been off of BiPAP, and presented for worsening shortness of breath, over the past 2-3 weeks.  She was initially admitted to the floor, but transferred to the ICU yesterday, when she had worsening oxygenation.       Interval Events:  She was transferred to the ICU, in order to use a BiPAP.  Since that time, she has continued to have some occasional desaturation, when taking naps, off of BiPAP.  She still states that she is fairly tired, and frequently naps throughout the day.    Consultants:  ICU:  Dr. Abdi.     Review of Systems   Constitutional: Positive for malaise/fatigue. Negative for chills and fever.   HENT: Negative for ear pain and sinus pain.    Eyes: Negative for double vision and pain.   Respiratory: Positive for cough and shortness of breath. Negative for hemoptysis and stridor.    Cardiovascular: Positive for orthopnea and leg swelling. Negative for chest pain.   Gastrointestinal: Negative for abdominal pain, constipation, diarrhea, nausea and vomiting.   Genitourinary: Negative for dysuria and hematuria.   Musculoskeletal: Negative for falls and myalgias.   Skin: Negative for rash.   Neurological:  "Positive for headaches. Negative for focal weakness and seizures.   Endo/Heme/Allergies: Does not bruise/bleed easily.   Psychiatric/Behavioral:        Hx of schizophrenia and bipolar.        PHYSICAL EXAM    Vitals:    11/15/18 1000 11/15/18 1200 11/15/18 1247 11/15/18 1400   BP:       Pulse: 79 (!) 59 69 71   Resp: 19 17 (!) 25 17   Temp: 36.5 °C (97.7 °F) 36.4 °C (97.5 °F)  36.7 °C (98 °F)   TempSrc: Temporal Temporal  Temporal   SpO2: 92% 96% 95% 93%   Weight:       Height:         Body mass index is 57.46 kg/m².  /65   Pulse 71   Temp 36.7 °C (98 °F) (Temporal)   Resp 17   Ht 1.575 m (5' 2\")   Wt (!) 142.5 kg (314 lb 2.5 oz)   SpO2 93%   Breastfeeding? No   BMI 57.46 kg/m²   O2 therapy: Pulse Oximetry: 93 %, O2 (LPM): 3, O2 Delivery: Silicone Nasal Cannula    Physical Exam   Constitutional: No distress.   Tired appearing Morbidly obese female.    HENT:   Head: Normocephalic and atraumatic.   Eyes: Pupils are equal, round, and reactive to light. EOM are normal. No scleral icterus.   Neck: No tracheal deviation present.   Very thick/obese neck.  Difficult to evaluate for JVD or detailed structures.    Cardiovascular: Normal rate and regular rhythm.    Pulmonary/Chest: Breath sounds normal. No stridor. She has no wheezes. She has no rales.   On oxygen, and using BIPAP with naps.   Abdominal: Soft. Bowel sounds are normal. She exhibits no distension. There is no tenderness. There is no rebound.   Musculoskeletal: She exhibits no edema or tenderness.   Neurological:   Awake, but tired.  Oriented.    Skin: Skin is warm and dry. No rash noted. She is not diaphoretic.   Psychiatric:   Appears very tired, and indifferent.        Respiratory:     Respiration: 17, Pulse Oximetry: 93 %, O2 Daily Delivery Respiratory : Silicone Nasal Cannula    Chest Tube Drains:  None.     Recent Labs      11/13/18   2138  11/14/18   0442   [id number]  7.28*  7.23*   [id number]  66.8*  77.1*   [id number]  75.4  79.5   LXNQ2DBU  " 92.6*  93.1   ARTHCO3  31*  32*   ARTBE  2  2       HemoDynamics:  Pulse: 71, Heart Rate (Monitored): 72 NIBP: 124/49      Neuro:  Awake.  Oriented.   Very tired.     Fluids:     Intake/Output Summary (Last 24 hours) at 11/15/18 1543  Last data filed at 11/15/18 1500   Gross per 24 hour   Intake             1420 ml   Output             2275 ml   Net             -855 ml           Body mass index is 57.46 kg/m².    Recent Labs      18   1226  18   1753  11/15/18   075   SODIUM   --   136   --   140  138   POTASSIUM   --   4.9   --   5.2  4.9   CHLORIDE   --   102   --   102  101   CO2   --   31   --   34*  32   BUN   --   12   --   13  19   CREATININE   --   0.69   --   0.70  0.73   MAGNESIUM  2.4   --    --   2.5   --    PHOSPHORUS   --    --   3.4   --    --    CALCIUM   --   9.2   --   9.5  9.9       GI/Nutrition:  Recent Labs      11/13/18   1914  11/14/18   0208  11/14/18   1753  11/15/18   075   ALTSGPT  8   --    --   9   ASTSGOT  6*   --    --   7*   ALKPHOSPHAT  68   --    --   61   TBILIRUBIN  1.0   --    --   0.7   GLUCOSE  84  154*  129*  114*       Heme:  Recent Labs      11/13/18   1914  11/14/18   0208  11/15/18   0755   RBC  4.21  4.29  4.28   HEMOGLOBIN  12.2  12.3  12.0   HEMATOCRIT  40.6  41.4  42.1   PLATELETCT  227  215  221       Infectious Disease:  Temp  Av.4 °C (97.6 °F)  Min: 36.1 °C (97 °F)  Max: 36.8 °C (98.2 °F)  Recent Labs      11/13/18   1914  11/14/18   0208  11/15/18   0755   WBC  12.0*  11.5*  12.3*   NEUTSPOLYS  79.60*  94.40*  88.10*   LYMPHOCYTES  13.00*  4.20*  6.20*   MONOCYTES  5.30  0.50  5.00   EOSINOPHILS  1.50  0.00  0.00   BASOPHILS  0.20  0.20  0.10   ASTSGOT  6*   --   7*   ALTSGPT  8   --   9   ALKPHOSPHAT  68   --   61   TBILIRUBIN  1.0   --   0.7       Meds:  • budesonide-formoterol  2 Puff     • Influenza Vaccine Quad pf  0.5 mL     • pneumococcal vaccine  0.5 mL     • nystatin       • albuterol  2 Puff     •  ipratropium-albuterol  3 mL     • predniSONE  40 mg     • senna-docusate  2 Tab      And   • polyethylene glycol/lytes  1 Packet      And   • magnesium hydroxide  30 mL      And   • bisacodyl  10 mg     • Respiratory Care per Protocol       • enoxaparin  40 mg     • labetalol  10 mg     • acetaminophen  650 mg     • azithromycin  250 mg     • ARIPiprazole  30 mg     • benztropine  1 mg     • doxepin  25 mg     • lithium  300 mg      And   • lithium  600 mg          Procedures:  BIPAP   ABG's    Imaging:  DX-CHEST-PORTABLE (1 VIEW)   Final Result      Cardiomegaly with minimal interstitial edema. No pleural effusions.      EC-ECHOCARDIOGRAM COMPLETE W/ CONT   Final Result      DX-CHEST-PORTABLE (1 VIEW)   Final Result      Cardiomegaly is increased compared to 2006. Correlation with echocardiogram is recommended. Pericardial effusion not excluded.      Interstitial prominence likely represents interstitial edema.             Problem and Plan:    * Acute hypoxemic respiratory failure (HCC)- (present on admission)   Assessment & Plan    Likely COPD exacerbation and noncompliance with BIPAP at night.   Possibly with underlying cardiac issue.   Low well's score, and normal d-dimer.   ABG with respiratory acidosis.   - on tele --> moved to ICU for BIPAP.   - RT protocol,  - duonebs q 6 hrs,  - BIPAP   - azithromycin and prednisone.   -  Incentive spirometry  - continuous pulse ox  - BIPAP whenever napping.   - CTM     COPD (chronic obstructive pulmonary disease) (HCC)- (present on admission)   Assessment & Plan    Plan as above for acute hypoxic respiratory failure   Home o2 eval prior to discharge, after breathing improves      Cardiomegaly   Assessment & Plan    Seen on chest xray, no previous cardiac history per patient   BNP wnl, trace edema no crackles heard but mild edema seen on chest xray  Due to pain upon laying flat/chest tightness and SOB as well as possible pericardial effusion on chest xray, will get echo to  evaluate  Patient has very large body habitus and I doubt the TTE will be a great quality but ordered that to start off with      KATHIA and COPD overlap syndrome (HCC)   Assessment & Plan    Apparent KATHIA....  Sleep study from [place], but no result in records.   Question of having had BIPAP before or not (but not for 2-3 weeks).   Pt's mother may stop by with one (depending on hx).   - Place on BIPAP, whenever napping.      Hx of  Bipolar affective disorder, and schizophrenia   Assessment & Plan    Denying any depressive symptoms.    Does not appear manic (appears very tired).   Continue home abilify and lithium          DISPO:  Investigating ability to use CPAP or BIPAP on floor  (may transfer if available enough).     CODE STATUS: full    Quality Measures:  Henao Catheter:   no  DVT Prophylaxis:   Lovenox  Ulcer Prophylaxis:   no  Antibiotics:   Yes (copd-exacerbation)   Lines:   Peripheral.       A computerized dictation system may have been used for this note.    Despite review, there may be some spelling or grammatical errors.    Martinez Pollock M.D.  11/15/2018   Attending:  David Abdi M.D.

## 2018-11-15 NOTE — PROGRESS NOTES
Juan from Lab called to say that the venous blood gas sample spilled in the bag during transport to lab and will need to be recollected. NOC shift RNAlana, notified.

## 2018-11-15 NOTE — PROGRESS NOTES
Dr. Hennessy notified that patient's HR is decreasing as low as 47. Dr. Hennessy came to the bedside to assess patient. He will place orders in [facility].

## 2018-11-15 NOTE — ASSESSMENT & PLAN NOTE
Apparent KATHIA....  Sleep study from [place], but no result in records.   Question of having had BIPAP before or not (but not for 2-3 weeks).   Pt's mother may stop by with one (depending on hx).   - Place on BIPAP, whenever napping.  -New home BiPAP order, as above.  -We will likely need a sleep study as an outpatient for BiPAP settings.

## 2018-11-15 NOTE — NON-PROVIDER
"[facility] ICU Progress Note      Admit Date: 11/13/2018  ICU Day: 2    Resident(s): Lee Ann Chauhan  Attending: RAJAT ESTRADA/ Dr. Abdi    Date & Time:   11/15/2018   7:12 AM       Patient ID:    Name:             Lorene Haro   YOB: 1975  Age:                 43 y.o.  female   MRN:               [id number]    HPI:  This is a 43-yr-old female who presented to the Emergency department from her PCP with low oxygen on room air. She was admitted for acute hypoxic respiratory failure to the floor.  A rapid response was called for worsening respiratory acidosis and increasing respiratory failure.   Patient transferred to [facility] and BiPAP therapy was initiated. PMH includes suicidal ideation in past, bipolar II disorder, schizophrenia, COPD, morbid obesity, and hysterectomy.  Current 1/2 pk/day smoker.  Recently moved back from [place], [place].  Influenza A & B negative, d-dimer and pro calcitonin wnl.  She tolerated titration of oxygen to 3L during the day and Bipap at night with increased hypercapnia during the night.  Patient with bradycardia while sleeping.     Consultants:    PMA:     Interval Events:   RRT Tx to CIC 11/14/18    Review of Systems   Unable to perform ROS: Acuity of condition       PHYSICAL EXAM 11/15/2018  Vitals:    11/15/18 0307 11/15/18 0400 11/15/18 0500 11/15/18 0600   BP:       Pulse:  (!) 58 76 61   Resp:  13 15 15   Temp:  36.1 °C (97 °F)     TempSrc:  Temporal     SpO2: 98% 97% 94% 99%   Weight:       Height:         Body mass index is 57.46 kg/m².  /65   Pulse 61   Temp 36.1 °C (97 °F) (Temporal)   Resp 15   Ht 1.575 m (5' 2\")   Wt (!) 142.5 kg (314 lb 2.5 oz)   SpO2 99%   BMI 57.46 kg/m²   O2 therapy: Pulse Oximetry: 99 %, O2 (LPM): 3, O2 Delivery: Nasal CPAP    Physical Exam   Constitutional:   Unkept, chronically ill female laying in bed, pulling of BIPAP and desaturating.  Requiring help to keep Bipap in place. Easily aroused at this time.    HENT:   Head: " Normocephalic and atraumatic.   Cardiovascular: Intact distal pulses.  Bradycardia present.  Exam reveals no gallop and no friction rub.    No murmur heard.  Pulses:       Radial pulses are 2+ on the right side, and 2+ on the left side.        Dorsalis pedis pulses are 2+ on the right side, and 2+ on the left side.   Pulmonary/Chest: No respiratory distress. She has no wheezes. She has no rales.   Desaturates with BIPAP off.    Abdominal: Soft. Bowel sounds are hypoactive.       Respiratory:     Respiration: 15, Pulse Oximetry: 99 %, O2 Daily Delivery Respiratory : Silicone Nasal Cannula    Chest Tube Drains:    Recent Labs      11/13/18 2138 11/14/18   0442   [id number]  7.28*  7.23*   [id number]  66.8*  77.1*   [id number]  75.4  79.5   [id number]  92.6*  93.1   ARTHCO3  31*  32*   ARTBE  2  2       HemoDynamics:  Pulse: 61, Heart Rate (Monitored): 64 NIBP: 126/65      Neuro:      Fluids:        Intake/Output Summary (Last 24 hours) at 11/15/18 0712  Last data filed at 11/15/18 0500   Gross per 24 hour   Intake             1140 ml   Output             1350 ml   Net             -210 ml         Body mass index is 57.46 kg/m².    Recent Labs      11/13/18 1914 11/13/18   2230  11/14/18 0208 11/14/18   1226  11/14/18   1753   SODIUM  139   --   136   --   140   POTASSIUM  4.2   --   4.9   --   5.2   CHLORIDE  103   --   102   --   102   CO2  33   --   31   --   34*   BUN  11   --   12   --   13   CREATININE  0.73   --   0.69   --   0.70   MAGNESIUM   --   2.4   --    --   2.5   PHOSPHORUS   --    --    --   3.4   --    CALCIUM  9.3   --   9.2   --   9.5       GI/Nutrition:  Recent Labs      11/13/18 1914 11/14/18 0208 11/14/18   1753   ALTSGPT  8   --    --    ASTSGOT  6*   --    --    ALKPHOSPHAT  68   --    --    TBILIRUBIN  1.0   --    --    GLUCOSE  84  154*  129*       Heme:  Recent Labs      11/13/18 1914 11/14/18   0208   RBC  4.21  4.29   HEMOGLOBIN  12.2  12.3   HEMATOCRIT  40.6  41.4   PLATELETCT   227  215       Infectious Disease:  Temp  Av.4 °C (97.5 °F)  Min: 36.1 °C (96.9 °F)  Max: 36.8 °C (98.2 °F)  Recent Labs      18   1914  18   0208   WBC  12.0*  11.5*   NEUTSPOLYS  79.60*  94.40*   LYMPHOCYTES  13.00*  4.20*   MONOCYTES  5.30  0.50   EOSINOPHILS  1.50  0.00   BASOPHILS  0.20  0.20   ASTSGOT  6*   --    ALTSGPT  8   --    ALKPHOSPHAT  68   --    TBILIRUBIN  1.0   --        Meds:  • nystatin       • albuterol  2 Puff     • ipratropium-albuterol  3 mL     • predniSONE  40 mg     • senna-docusate  2 Tab      And   • polyethylene glycol/lytes  1 Packet      And   • magnesium hydroxide  30 mL      And   • bisacodyl  10 mg     • Respiratory Care per Protocol       • enoxaparin  40 mg     • labetalol  10 mg     • acetaminophen  650 mg     • azithromycin  250 mg     • ARIPiprazole  30 mg     • benztropine  1 mg     • doxepin  25 mg     • budesonide-formoterol  2 Puff     • lithium  300 mg      And   • lithium  600 mg          Procedures:      Imaging:  EC-ECHOCARDIOGRAM COMPLETE W/ CONT   Final Result      DX-CHEST-PORTABLE (1 VIEW)   Final Result      Cardiomegaly is increased compared to 2006. Correlation with echocardiogram is recommended. Pericardial effusion not excluded.      Interstitial prominence likely represents interstitial edema.             Problem and Plan:  11/15/2018    1)  Acute Respiratory Failure with Hypercapnia                 -BiPAP at night              -RT protocol              -Duo nebs PRN Q6 hrs               - Solumedrol 40 mg IV q6 hrs               -Azithromycin 250 mg po qday              -Incentive spirometer              -Ddimer negative              -Influenza A & B (-)              -Hypoventilation Syndrome: BMI 57.      2) Respiratory Acidosis, CO2 retention                 - BiPAP               - Recheck ABGs     3) Copd Exacerbation                 -Titrate oxygen > 88%              -PFT ordered as outpatient     4) Cardiomegaly                 -Seen  on chest xray vs interstitial edema               -Complete Echocardiogram ordered              -BNP 43           5) Bipolar Disorder                            -Continue home medication therapy: Abilify and Lithium              -Lithium level checked, wnl     6) Morbid Obesity                 -Dietary consult     7) Obstructive Sleep Apnea      -Patient has not had home CPAP since she moved from [place]      DISPO: Transfer to Floor    CODE STATUS: ICU    Quality Measures:   Montague Catheter: No montague  DVT Prophylaxis: Enoxaparin 40 mg SQ qday  Ulcer Prophylaxis: Not Indicated  Antibiotics: Azithromycin 250 mg qday  Lines: PIV

## 2018-11-15 NOTE — PROGRESS NOTES
Critical Care Progress Note    Date of admission  11/13/2018    Chief Complaint  43 y.o. female admitted 11/13/2018 with shortness of breath    Hospital Course  43 y.o. female who presented 11/13/2018 with worsening progressive shortness of breath since moving from [place] 2 weeks ago.  Denies any chest pain cough runny nose sore throat.  Denies any new medication or drug use.  Had worsening dyspnea went to her primary care doctor was found to have a saturation of 70% sent to the ER was found to have a saturation of 78% on room air was then admitted to the floor after stabilization after on a couple liters nasal cannula for COPD exacerbation.  Patient overnight was found to be more somnolent and ABG was performed showed a pH of 7.23 PCO2 of 77 and a PO2 of 79.  Patient was transferred to the ICU for nasal BiPAP.  When I met the patient patient awakes easily however does fall asleep.  Answers all questions appropriately denies headache numbness tingling or weakness or vision changes.  Denies nausea vomiting diarrhea sick contacts.  Denies worsening swelling or edema lower extremities.  Later the mother was able to bring medical records and provide further history her BiPAP/CPAP machine was left in [place] which she recently has a diagnosis of obstructive sleep apnea with horrible snoring. She has been also admitted multiple times for similar events with saturations 83-84% and treated for possible pneumonia in the last year. She also had a history of tobacco abuse and quit some time ago.       Interval Problem Update  Reviewed last 24 hour events:  AO4   Afebrile  Good appetite  No montague  Bipap 12/8  fio35%  lovenox vte  azithro 3/5      Review of Systems  Review of Systems   Respiratory: Negative for sputum production and shortness of breath.    Cardiovascular: Negative for chest pain and leg swelling.   Gastrointestinal: Negative for abdominal pain and vomiting.   Neurological: Negative for focal weakness and  weakness.   All other systems reviewed and are negative.       Vital Signs for last 24 hours   Temp:  [36.1 °C (97 °F)-36.7 °C (98 °F)] 36.4 °C (97.6 °F)  Pulse:  [50-79] 64  Resp:  [12-27] 20    Hemodynamic parameters for last 24 hours       Respiratory       Physical Exam   Physical Exam   Constitutional: She is oriented to person, place, and time. She appears well-developed and well-nourished.   Severely obese female sitting upright in a chair no acute distress on some nasal oxygen   HENT:   Head: Normocephalic.   Neck:   Neck exam limited unable to assess JVD   Cardiovascular:   No murmur heard.  Regular rate and rhythm   Pulmonary/Chest: Effort normal and breath sounds normal.   No wheezes rales or rhonchi appreciated   Abdominal: Soft. Bowel sounds are normal. She exhibits no distension. There is no tenderness. There is no rebound and no guarding.   Obese soft nondistended   Musculoskeletal: Normal range of motion. She exhibits no edema.   Neurological: She is alert and oriented to person, place, and time. No cranial nerve deficit. Coordination normal.   Moves all extremities without difficulty has 5/5 upper and lower exam strength   Skin: Skin is warm.   Psychiatric:   Flat affect female, mild mentally delay       Medications  Current Facility-Administered Medications   Medication Dose Route Frequency Provider Last Rate Last Dose   • budesonide-formoterol (SYMBICORT) 160-4.5 MCG/ACT inhaler 2 Puff  2 Puff Inhalation BID David Abdi M.D.       • Influenza Vaccine Quad pf injection 0.5 mL  0.5 mL Intramuscular Once PRN David Abdi M.D.       • pneumococcal vaccine (PNEUMOVAX-23) injection 25 mcg  0.5 mL Intramuscular Once PRN David Abdi M.D.       • nystatin (MYCOSTATIN) powder   Topical BID Timothy Hennessy M.D.       • albuterol inhaler 2 Puff  2 Puff Inhalation BID David Abdi M.D.   2 Puff at 11/15/18 0600   • ipratropium-albuterol (DUONEB) nebulizer solution  3 mL Nebulization  Q6HRS PRN (RT) David Abdi M.D.       • predniSONE (DELTASONE) tablet 40 mg  40 mg Oral DAILY David Abdi M.D.   40 mg at 11/15/18 0450   • senna-docusate (PERICOLACE or SENOKOT S) 8.6-50 MG per tablet 2 Tab  2 Tab Oral BID Love Angeles M.D.   2 Tab at 11/15/18 0450    And   • polyethylene glycol/lytes (MIRALAX) PACKET 1 Packet  1 Packet Oral QDAY PRN Love Angeles M.D.        And   • magnesium hydroxide (MILK OF MAGNESIA) suspension 30 mL  30 mL Oral QDAY PRN Love Angeles M.D.        And   • bisacodyl (DULCOLAX) suppository 10 mg  10 mg Rectal QDAY PRN Love Angeles M.D.       • Respiratory Care per Protocol   Nebulization Continuous RT Love Angeles M.D.       • enoxaparin (LOVENOX) inj 40 mg  40 mg Subcutaneous DAILY Love Angeles M.D.   40 mg at 11/15/18 0450   • labetalol (NORMODYNE,TRANDATE) injection 10 mg  10 mg Intravenous Q4HRS PRN Love Angeles M.D.       • acetaminophen (TYLENOL) tablet 650 mg  650 mg Oral Q6HRS PRN Love Angeles M.D.       • azithromycin (ZITHROMAX) tablet 250 mg  250 mg Oral DAILY Love Angeles M.D.   250 mg at 11/15/18 0450   • ARIPiprazole (ABILIFY) tablet 30 mg  30 mg Oral QAM Love Angeles M.D.   30 mg at 11/15/18 0450   • benztropine (COGENTIN) tablet 1 mg  1 mg Oral QAM Love Angeles M.D.   1 mg at 11/15/18 0451   • doxepin (SINEQUAN) capsule 25 mg  25 mg Oral QHS Love Angeles M.D.   25 mg at 11/14/18 2055   • lithium (ESKALITH) tablet 300 mg  300 mg Oral QAM Yeison Salvador M.D.   300 mg at 11/15/18 0450    And   • lithium (ESKALITH) tablet 600 mg  600 mg Oral QHS Yeison Salvador M.D.   600 mg at 11/14/18 2055       Fluids    Intake/Output Summary (Last 24 hours) at 11/15/18 1647  Last data filed at 11/15/18 1600   Gross per 24 hour   Intake             1560 ml   Output             2150 ml   Net             -590 ml       Laboratory  Recent Labs      11/13/18 2138  11/14/18   0442   [id number]  7.28*  7.23*   [id number]   66.8*  77.1*   [id number]  75.4  79.5   [id number]  92.6*  93.1   ARTHCO3  31*  32*   ARTBE  2  2     Recent Labs      11/13/18 1914   TROPONINI  0.01   BNPBTYPENAT  45     Recent Labs      11/13/18 2230 11/14/18 0208  11/14/18   1226  11/14/18   1753  11/15/18   0755   SODIUM   --   136   --   140  138   POTASSIUM   --   4.9   --   5.2  4.9   CHLORIDE   --   102   --   102  101   CO2   --   31   --   34*  32   BUN   --   12 --   13 19   CREATININE   --   0.69   --   0.70  0.73   MAGNESIUM  2.4   --    --   2.5   --    PHOSPHORUS   --    --   3.4   --    --    CALCIUM   --   9.2   --   9.5  9.9     Recent Labs      11/13/18 1914 11/14/18 0208 11/14/18   1753  11/15/18   0755   ALTSGPT  8   --    --   9   ASTSGOT  6*   --    --   7*   ALKPHOSPHAT  68   --    --   61   TBILIRUBIN  1.0   --    --   0.7   GLUCOSE  84  154*  129*  114*     Recent Labs      11/13/18   1914  11/14/18   0208  11/15/18   0755   WBC  12.0*  11.5*  12.3*   NEUTSPOLYS  79.60*  94.40*  88.10*   LYMPHOCYTES  13.00*  4.20*  6.20*   MONOCYTES  5.30  0.50  5.00   EOSINOPHILS  1.50  0.00  0.00   BASOPHILS  0.20  0.20  0.10   ASTSGOT  6*   --   7*   ALTSGPT  8   --   9   ALKPHOSPHAT  68   --   61   TBILIRUBIN  1.0   --   0.7     Recent Labs      11/13/18   1914  11/14/18   0208  11/15/18   0755   RBC  4.21  4.29  4.28   HEMOGLOBIN  12.2  12.3  12.0   HEMATOCRIT  40.6  41.4  42.1   PLATELETCT  227  215  221       Imaging  X-Ray:  I have personally reviewed the images and compared with prior images. and My impression is: No acute findings on chest x-ray today difficult to interpret secondary to body mass no obvious infiltrates or pleural effusions    Assessment/Plan  * Acute hypoxemic respiratory failure (HCC)- (present on admission)   Assessment & Plan    Related to known diagnosis of obstructive sleep apnea in addition to obesity hypoventilation syndrome  Continue to use BiPAP and titrate settings for naps and at night,     The mother  mother told me that she would get the BiPAP machine that was in [place] however later finding that this machine was never delivered to their home.  Patient will need BiPAP machine prior to discharge and will need reports and records from her sleep study diagnosing her sleep apnea.     Chronic hypoxemia requiring oxygen titrate saturations to 88-92% to help with VQ match  Continue 5-day course of azithromycin clinically not significant for pneumonia  Follow-up on echocardiogram to evaluate right ventricular pressures  D-dimer unremarkable and history not consistent with pulmonary emboli  Continue to finish course of steroids times 5-day    Patient much improved today sitting up in a chair on minimal oxygen requirement.  Team on the floor when patient transfers will need to be very vigilant that anytime the patient is sleeping will require BiPAP therapy and a course at night.     Avoid all sedatives narcotics and marijuana     COPD (chronic obstructive pulmonary disease) ([facility])- (present on admission)   Assessment & Plan    Continue duo nebs as necessary  Will wean steroids since no signs of wheezing  Chronic CO2 retention of 60s and a resting serum bicarbonate of 30     Cardiomegaly   Assessment & Plan    Echo was difficult formal study however EF is normal possible aortic stenosis and RVSP of 60  Would recommend diuresis gentle in the face of possible aortic stenosis     Class 3 severe obesity due to excess calories with body mass index (BMI) of 50.0 to 59.9 in adult ([facility])   Assessment & Plan    Severe obesity possible related to psychiatric medications and KATHIA  Recommend diet modification and outpatient referral for weight reductive surgery     Hx of  Bipolar affective disorder, and schizophrenia   Assessment & Plan    Continue home medications with lithium doxepin and Cogentin  Per notes from outside records patient also has a history of schizophrenia          VTE:  Lovenox  Ulcer: Not Indicated  Lines: None    I  have performed a physical exam and reviewed and updated ROS and Plan today (11/15/2018). In review of yesterday's note (11/14/2018), there are no changes except as documented above.     Discussed patient condition and risk of morbidity and/or mortality with RN, RT, Pharmacy, [facility] resident, Charge nurse / hot rounds and Patient  The patient remains critically ill from severe obesity hypoventilation syndrome and hypoxia requiring active titration of BiPAP to prevent desaturation and potential acute intubation.  Critical care time = 45 minutes in directly providing and coordinating critical care and extensive data review.  No time overlap and excludes procedures.

## 2018-11-15 NOTE — FACE TO FACE
Face to Face Note  -  Durable Medical Equipment    Timothy Hennessy M.D. - NPI: [id number]  I certify that this patient is under my care and that they had a durable medical equipment(DME)face to face encounter by myself that meets the physician DME face-to-face encounter requirements with this patient on:    Date of encounter:   Patient:                    MRN:                       YOB: 2020  Lorene Haro  [id number]  1975     The encounter with the patient was in whole, or in part, for the following medical condition, which is the primary reason for durable medical equipment:  COPD    I certify that, based on my findings, the following durable medical equipment is medically necessary:  BI-PAP.    HOME O2 Saturation Measurements:(Values must be present for Home Oxygen orders)         ,     ,         My Clinical findings support the need for the above equipment due to:  Hypoxia    Supporting Symptoms: Several admission for hypoxia in the last several months

## 2018-11-16 PROBLEM — J96.01 ACUTE HYPOXEMIC RESPIRATORY FAILURE (HCC): Status: RESOLVED | Noted: 2018-11-13 | Resolved: 2018-11-16

## 2018-11-16 LAB
ANION GAP SERPL CALC-SCNC: 3 MMOL/L (ref 0–11.9)
BASOPHILS # BLD AUTO: 0.2 % (ref 0–1.8)
BASOPHILS # BLD: 0.02 K/UL (ref 0–0.12)
BUN SERPL-MCNC: 23 MG/DL (ref 8–22)
CALCIUM SERPL-MCNC: 9.5 MG/DL (ref 8.5–10.5)
CHLORIDE SERPL-SCNC: 100 MMOL/L (ref 96–112)
CO2 SERPL-SCNC: 35 MMOL/L (ref 20–33)
CREAT SERPL-MCNC: 0.75 MG/DL (ref 0.5–1.4)
EOSINOPHIL # BLD AUTO: 0.04 K/UL (ref 0–0.51)
EOSINOPHIL NFR BLD: 0.4 % (ref 0–6.9)
ERYTHROCYTE [DISTWIDTH] IN BLOOD BY AUTOMATED COUNT: 51.7 FL (ref 35.9–50)
GLUCOSE SERPL-MCNC: 84 MG/DL (ref 65–99)
HCT VFR BLD AUTO: 39.7 % (ref 37–47)
HGB BLD-MCNC: 11.4 G/DL (ref 12–16)
IMM GRANULOCYTES # BLD AUTO: 0.04 K/UL (ref 0–0.11)
IMM GRANULOCYTES NFR BLD AUTO: 0.4 % (ref 0–0.9)
LYMPHOCYTES # BLD AUTO: 1.72 K/UL (ref 1–4.8)
LYMPHOCYTES NFR BLD: 17.6 % (ref 22–41)
MCH RBC QN AUTO: 27.9 PG (ref 27–33)
MCHC RBC AUTO-ENTMCNC: 28.7 G/DL (ref 33.6–35)
MCV RBC AUTO: 97.3 FL (ref 81.4–97.8)
MONOCYTES # BLD AUTO: 0.72 K/UL (ref 0–0.85)
MONOCYTES NFR BLD AUTO: 7.4 % (ref 0–13.4)
NEUTROPHILS # BLD AUTO: 7.24 K/UL (ref 2–7.15)
NEUTROPHILS NFR BLD: 74 % (ref 44–72)
NRBC # BLD AUTO: 0 K/UL
NRBC BLD-RTO: 0 /100 WBC
PLATELET # BLD AUTO: 203 K/UL (ref 164–446)
PMV BLD AUTO: 11.2 FL (ref 9–12.9)
POTASSIUM SERPL-SCNC: 4.4 MMOL/L (ref 3.6–5.5)
RBC # BLD AUTO: 4.08 M/UL (ref 4.2–5.4)
SODIUM SERPL-SCNC: 138 MMOL/L (ref 135–145)
WBC # BLD AUTO: 9.8 K/UL (ref 4.8–10.8)

## 2018-11-16 PROCEDURE — 90471 IMMUNIZATION ADMIN: CPT

## 2018-11-16 PROCEDURE — 770020 HCHG ROOM/CARE - TELE (206)

## 2018-11-16 PROCEDURE — 94660 CPAP INITIATION&MGMT: CPT

## 2018-11-16 PROCEDURE — 36415 COLL VENOUS BLD VENIPUNCTURE: CPT

## 2018-11-16 PROCEDURE — 97116 GAIT TRAINING THERAPY: CPT

## 2018-11-16 PROCEDURE — 3E02340 INTRODUCTION OF INFLUENZA VACCINE INTO MUSCLE, PERCUTANEOUS APPROACH: ICD-10-PCS | Performed by: INTERNAL MEDICINE

## 2018-11-16 PROCEDURE — 700102 HCHG RX REV CODE 250 W/ 637 OVERRIDE(OP): Performed by: STUDENT IN AN ORGANIZED HEALTH CARE EDUCATION/TRAINING PROGRAM

## 2018-11-16 PROCEDURE — 97535 SELF CARE MNGMENT TRAINING: CPT

## 2018-11-16 PROCEDURE — 94664 DEMO&/EVAL PT USE INHALER: CPT

## 2018-11-16 PROCEDURE — 80048 BASIC METABOLIC PNL TOTAL CA: CPT

## 2018-11-16 PROCEDURE — 99253 IP/OBS CNSLTJ NEW/EST LOW 45: CPT | Performed by: INTERNAL MEDICINE

## 2018-11-16 PROCEDURE — 85025 COMPLETE CBC W/AUTO DIFF WBC: CPT

## 2018-11-16 PROCEDURE — 5A09357 ASSISTANCE WITH RESPIRATORY VENTILATION, LESS THAN 24 CONSECUTIVE HOURS, CONTINUOUS POSITIVE AIRWAY PRESSURE: ICD-10-PCS | Performed by: HOSPITALIST

## 2018-11-16 PROCEDURE — 99232 SBSQ HOSP IP/OBS MODERATE 35: CPT | Mod: GC | Performed by: HOSPITALIST

## 2018-11-16 PROCEDURE — 700111 HCHG RX REV CODE 636 W/ 250 OVERRIDE (IP): Performed by: STUDENT IN AN ORGANIZED HEALTH CARE EDUCATION/TRAINING PROGRAM

## 2018-11-16 PROCEDURE — 99407 BEHAV CHNG SMOKING > 10 MIN: CPT

## 2018-11-16 PROCEDURE — 700111 HCHG RX REV CODE 636 W/ 250 OVERRIDE (IP): Performed by: INTERNAL MEDICINE

## 2018-11-16 PROCEDURE — A9270 NON-COVERED ITEM OR SERVICE: HCPCS | Performed by: STUDENT IN AN ORGANIZED HEALTH CARE EDUCATION/TRAINING PROGRAM

## 2018-11-16 PROCEDURE — 97530 THERAPEUTIC ACTIVITIES: CPT

## 2018-11-16 PROCEDURE — 90686 IIV4 VACC NO PRSV 0.5 ML IM: CPT | Performed by: INTERNAL MEDICINE

## 2018-11-16 RX ORDER — PREDNISONE 20 MG/1
30 TABLET ORAL DAILY
Qty: 11 TAB | Refills: 0 | Status: SHIPPED | OUTPATIENT
Start: 2018-11-16 | End: 2018-11-23

## 2018-11-16 RX ADMIN — NYSTATIN: 100000 POWDER TOPICAL at 17:39

## 2018-11-16 RX ADMIN — BENZTROPINE MESYLATE 1 MG: 1 TABLET ORAL at 06:40

## 2018-11-16 RX ADMIN — AZITHROMYCIN MONOHYDRATE 250 MG: 250 TABLET ORAL at 05:21

## 2018-11-16 RX ADMIN — LITHIUM CARBONATE 600 MG: 300 TABLET ORAL at 21:11

## 2018-11-16 RX ADMIN — ENOXAPARIN SODIUM 40 MG: 100 INJECTION SUBCUTANEOUS at 05:21

## 2018-11-16 RX ADMIN — STANDARDIZED SENNA CONCENTRATE AND DOCUSATE SODIUM 2 TABLET: 8.6; 5 TABLET, FILM COATED ORAL at 17:39

## 2018-11-16 RX ADMIN — DOXEPIN HYDROCHLORIDE 25 MG: 25 CAPSULE ORAL at 00:17

## 2018-11-16 RX ADMIN — BUDESONIDE AND FORMOTEROL FUMARATE DIHYDRATE 2 PUFF: 160; 4.5 AEROSOL RESPIRATORY (INHALATION) at 05:20

## 2018-11-16 RX ADMIN — DOXEPIN HYDROCHLORIDE 25 MG: 25 CAPSULE ORAL at 21:11

## 2018-11-16 RX ADMIN — ALBUTEROL SULFATE 2 PUFF: 90 AEROSOL, METERED RESPIRATORY (INHALATION) at 18:00

## 2018-11-16 RX ADMIN — ARIPIPRAZOLE 30 MG: 10 TABLET ORAL at 05:21

## 2018-11-16 RX ADMIN — PREDNISONE 40 MG: 20 TABLET ORAL at 05:21

## 2018-11-16 RX ADMIN — INFLUENZA A VIRUS A/MICHIGAN/45/2015 X-275 (H1N1) ANTIGEN (FORMALDEHYDE INACTIVATED), INFLUENZA A VIRUS A/SINGAPORE/INFIMH-16-0019/2016 IVR-186 (H3N2) ANTIGEN (FORMALDEHYDE INACTIVATED), INFLUENZA B VIRUS B/PHUKET/3073/2013 ANTIGEN (FORMALDEHYDE INACTIVATED), AND INFLUENZA B VIRUS B/MARYLAND/15/2016 BX-69A ANTIGEN (FORMALDEHYDE INACTIVATED) 0.5 ML: 15; 15; 15; 15 INJECTION, SUSPENSION INTRAMUSCULAR at 12:36

## 2018-11-16 RX ADMIN — LITHIUM CARBONATE 300 MG: 300 TABLET ORAL at 06:40

## 2018-11-16 RX ADMIN — BUDESONIDE AND FORMOTEROL FUMARATE DIHYDRATE 2 PUFF: 160; 4.5 AEROSOL RESPIRATORY (INHALATION) at 17:39

## 2018-11-16 RX ADMIN — ALBUTEROL SULFATE 2 PUFF: 90 AEROSOL, METERED RESPIRATORY (INHALATION) at 05:21

## 2018-11-16 RX ADMIN — ACETAMINOPHEN 650 MG: 325 TABLET, FILM COATED ORAL at 20:11

## 2018-11-16 RX ADMIN — NYSTATIN: 100000 POWDER TOPICAL at 05:31

## 2018-11-16 ASSESSMENT — COGNITIVE AND FUNCTIONAL STATUS - GENERAL
WALKING IN HOSPITAL ROOM: A LITTLE
MOVING TO AND FROM BED TO CHAIR: UNABLE
MOVING FROM LYING ON BACK TO SITTING ON SIDE OF FLAT BED: UNABLE
DRESSING REGULAR UPPER BODY CLOTHING: A LITTLE
TOILETING: A LITTLE
DRESSING REGULAR LOWER BODY CLOTHING: A LOT
MOBILITY SCORE: 13
PERSONAL GROOMING: A LITTLE
CLIMB 3 TO 5 STEPS WITH RAILING: A LITTLE
SUGGESTED CMS G CODE MODIFIER DAILY ACTIVITY: CK
SUGGESTED CMS G CODE MODIFIER MOBILITY: CL
STANDING UP FROM CHAIR USING ARMS: A LITTLE
TURNING FROM BACK TO SIDE WHILE IN FLAT BAD: A LOT
HELP NEEDED FOR BATHING: A LITTLE
DAILY ACTIVITIY SCORE: 18

## 2018-11-16 ASSESSMENT — PAIN SCALES - GENERAL
PAINLEVEL_OUTOF10: 0
PAINLEVEL_OUTOF10: 2
PAINLEVEL_OUTOF10: 0

## 2018-11-16 ASSESSMENT — PATIENT HEALTH QUESTIONNAIRE - PHQ9
SUM OF ALL RESPONSES TO PHQ9 QUESTIONS 1 AND 2: 0
2. FEELING DOWN, DEPRESSED, IRRITABLE, OR HOPELESS: NOT AT ALL
1. LITTLE INTEREST OR PLEASURE IN DOING THINGS: NOT AT ALL

## 2018-11-16 ASSESSMENT — GAIT ASSESSMENTS
GAIT LEVEL OF ASSIST: CONTACT GUARD ASSIST
DISTANCE (FEET): 30
ASSISTIVE DEVICE: FRONT WHEEL WALKER
DEVIATION: INCREASED BASE OF SUPPORT;SHUFFLED GAIT;BRADYKINETIC

## 2018-11-16 NOTE — DISCHARGE PLANNING
Medical Social Work    LSW received update that pt is needing home bi-pap. Pt apparently had a sleep study done within the last month or two in [place], but does not know where this was done. Pt stated her mother might know. LSW left  for pt's mother requesting a call to see if she knows where we can obtain records from.

## 2018-11-16 NOTE — CARE PLAN
Problem: Safety  Goal: Will remain free from falls  Outcome: PROGRESSING AS EXPECTED  Patient resting in bed with call light in reach and bed alarm on.     Problem: Pain Management  Goal: Pain level will decrease to patient's comfort goal  Outcome: PROGRESSING AS EXPECTED  Patient advised to request prn pain medication as needed for pain management.

## 2018-11-16 NOTE — PROGRESS NOTES
Assumed care of patient.  Bedside report given by Kelvin Artis RN.  Patient resting comfortably in bed with call light in reach.

## 2018-11-16 NOTE — THERAPY
"Physical Therapy Treatment completed.   Bed Mobility:  Supine to Sit:  (NT seated EOB pre, in chair post)  Transfers: Sit to Stand: Contact Guard Assist  Gait: Level Of Assist: Contact Guard Assist with Front-Wheel Walker       Plan of Care: Will benefit from Physical Therapy 3 times per week  Discharge Recommendations: Equipment: Front-Wheel Walker. Post-acute therapy Discharge to a transitional care facility for continued skilled therapy services.     See \"Rehab Therapy-Acute\" Patient Summary Report for complete documentation.       "

## 2018-11-16 NOTE — PROGRESS NOTES
Assumed care of patient, pt on 4L O2 with nasal cannula. Tires easily when ambulates. Encouraged self care. Skin intact, except moisture redness in pannus and under breasts. Tolerated sitting in chair for an hour after ambulating with PT with a walker. Pt states she does not wear O2 at home and per report, refusing CPAP at HS.   Spine appears normal, range of motion is not limited, no muscle or joint tenderness

## 2018-11-16 NOTE — PROGRESS NOTES
Pt transported on tele monitor to [id number], report given to Pola GREENE, all questions answered, pt VS stable, and pt a/ox4 during transport.

## 2018-11-16 NOTE — CARE PLAN
Pt. overall improving over shift    Acute Events: BiPap while sleeping    Mobility: 1 assist to chair, pt. In chair for majority of day.     Neuro:  AAOx4, equal strength.    CV: Hemodynamically stable.  afebrile    Resp: NC when awake, o2 weaned as tolerated. BiPAP when sleeping, see RT note.    GI: Regular diet, BM x2, abd obese/rounded,    : Voiding on bedpan    Skin: Redness in skin folds. Nystatin and interdry utilized.     Summary: Pt. Overall improving, downgraded to telemetry status.      Problem: Safety  Goal: Will remain free from falls  Outcome: PROGRESSING AS EXPECTED      Problem: Skin Integrity  Goal: Risk for impaired skin integrity will decrease  Outcome: PROGRESSING AS EXPECTED

## 2018-11-16 NOTE — PROGRESS NOTES
[facility] Transfer Note                                                                                         ICU Admit Date: 11/13/2018     ICU Discharge Date:  11/15/18       Primary Diagnosis:   Acute hypoxemic respiratory failure (HCC)       ICU Course Summary (Brief Narrative):  43 y.o. Female, with Hx COPD and KATHIA, schizophrenia and bipolar.  She apparently has previously had a sleep study done in [place], and is supposed to have been using a BiPAP.  However, there is conflicting information about this.  The patient currently states that she has never received the BiPAP.  However, other physician state that they have spoken with family members who state that she has left it in [place], when she moved to [place] 2 or 3 weeks ago.  Either way, she has been off of BiPAP, and presented for worsening shortness of breath, over the past 2-3 weeks.  She was initially admitted to the floor, but transferred to the ICU yesterday, when she had worsening oxygenation.       Interval Events:  She was transferred to the ICU, in order to use a BiPAP.  Since that time, she has continued to have some occasional desaturation, when taking naps, off of BiPAP.  She still states that she is fairly tired, and frequently naps throughout the day.     Has been on BIPAP, when sleeping/napping.   Able to continue this, on the floor (since not for rescue).   Patient's mother might be bringing-in a home BIPAP machine.      Important Events in the ICU:   - Central Line : None   - Intubation:  None  (BIPAP-PRN)  - Pressors:  None   - Henao catheter:  None  - Tube feeding:  None  - Antibiotics: Yes - (COPD exacerbation)  - Other procedures: None        Labs and imaging studies to be continued with their indications:  - CBC:  Yes; (monitor / occasional)    - CMP or BMP: Yes  (monitor / occasional)    - Magnesium:  No   (can get occasionally)  - Phosphorus: No   - Chest Xray:  Yes  (some interstitial edema, monitor as needed)  - Other studies: None          Things to follow:   -  Get pt out-of-bed.  -  Use BIPAP, at night, and whenever napping.   -  continuous pulse-ox.   -  Goal O2 - 88-92 (don't aim higher).   -  Follow-up with getting BIPAP (either from pt's mother or new device).       A computerized dictation system may have been used for this note.    Despite review, there may be some spelling or grammatical errors.    Martinez Pollock M.D.  11/15/2018   Attending:  David Abdi M.D.

## 2018-11-16 NOTE — PROGRESS NOTES
2 RN Skin Assessment with Sony RN:    Coccyx is pink and blanchable.  Mepilex applied.  Bilateral Ears are red and blanchable.  Grey cushion tubing applied to oxygen tubing.  Bilateral feet are dry and scaly.    Bilateral heels are pink and blanchable.  Bilateral elbows are pink and blanchable.  Face is scaly, dry, and discolored.    Discoloration to right shin.  Abdominal folds are red.  Interdry applied.    Groin folds are red, excoriated.  Barrier cream applied.

## 2018-11-16 NOTE — THERAPY
"Occupational Therapy Treatment completed with focus on ADLs and ADL transfers.  Functional Status:  SBA standing grooming, Max A LB dressing, CGA STS, SBA functional mobility w/ FWW  Plan of Care: Will benefit from Occupational Therapy 3 times per week  Discharge Recommendations:  Equipment Will Continue to Assess for Equipment Needs. Post-acute therapy Discharge to home with outpatient or home health for additional skilled therapy services.    See \"Rehab Therapy-Acute\" Patient Summary Report for complete documentation.     Pt demo'd improved balance, functional mobility, and overall alertness from evaluation date. Pt continues to be limited by impaired activity tolerance. While standing at sink pt was on 4L of oxygen, lowest reading was 88% pt recovered in under a minute while seated. Will continue to follow for Acute OT services.  "

## 2018-11-16 NOTE — CONSULTS
"Critical Care/Pulmonary Consultation    Date of service: 11/16/2018    Consulting Physician: DARRIN Whitmore M.D.    History of Present Illness: Lorene Haro is a 43 y.o. female here for sob and hypoxia.  Pt recently moved to [place] from [place] to be near her mother and sister.   HPI   She states she was followed in [place] for psychiatric illness and by general internal medicine Dr. Musa.  She says she recently has a sleep study in [place] but was not prescribed cpap or bipap machines before moving here.  She denies cough but says she has had prior pneumonias.  Her mother says she snores.  The patient denies awakening with sob at night.  She indicates she gets sob with limited activity.  No recent fevers or rigors.   Family has two dogs.  No hx asthma.  Says she has swelling of her lower extremities and 3 pillow orthopnea.    No current facility-administered medications on file prior to encounter.      No current outpatient prescriptions on file prior to encounter.       Social History   Substance Use Topics   • Smoking status: Current Every Day Smoker     Packs/day: 0.50     Types: Cigarettes   • Smokeless tobacco: Not on file      Comment: since 1992, trying to quit   • Alcohol use No        Past Medical History:   Diagnosis Date   • Bipolar 2 disorder (HCC)    • Other psychological stress    • Schizophrenic disorder (HCC)        Past Surgical History:   Procedure Laterality Date   • CHOLECYSTECTOMY     • COLECTOMY     • HYSTERECTOMY LAPAROSCOPY     • KNEE ARTHROSCOPY         Allergies: Amoxicillin    No family history on file.    Review of Systems   Unable to perform ROS: Mental acuity       Physical Examination  BP (!) 98/64   Pulse 69   Temp 36.7 °C (98.1 °F) (Temporal)   Resp 16   Ht 1.575 m (5' 2\")   Wt (!) 134.3 kg (296 lb 1.2 oz)   SpO2 95%   Breastfeeding? No   BMI 54.15 kg/m²     Physical Exam   Constitutional: No distress.   obese   HENT:   Head: Normocephalic.   Neck: Normal range of " motion. No tracheal deviation present. No thyromegaly present.   Cardiovascular: Normal rate and regular rhythm.    Pulmonary/Chest: Effort normal and breath sounds normal. No stridor. No respiratory distress.   Abdominal: Bowel sounds are normal.   Obese     Musculoskeletal: Normal range of motion. She exhibits edema (non pitting).   Neurological: She is alert.   Memory limited  Problem finding words   Skin: Skin is warm and dry. She is not diaphoretic.         Recent Labs      11/14/18   0208  11/15/18   0755  11/16/18   0504   WBC  11.5*  12.3*  9.8   RBC  4.29  4.28  4.08*   HEMOGLOBIN  12.3  12.0  11.4*   HEMATOCRIT  41.4  42.1  39.7   MCV  96.5  98.4*  97.3   MCH  28.7  28.0  27.9   RDW  51.8*  52.6*  51.7*   PLATELETCT  215  221  203   MPV  11.4  11.6  11.2   NEUTSPOLYS  94.40*  88.10*  74.00*   LYMPHOCYTES  4.20*  6.20*  17.60*   MONOCYTES  0.50  5.00  7.40   EOSINOPHILS  0.00  0.00  0.40   BASOPHILS  0.20  0.10  0.20   RBCMORPHOLO  Present   --    --       Recent Labs      11/13/18   2230   11/14/18   1226  11/14/18   1753  11/15/18   0755  11/16/18   0504   SODIUM   --    < >   --   140  138  138   POTASSIUM   --    < >   --   5.2  4.9  4.4   CHLORIDE   --    < >   --   102  101  100   CO2   --    < >   --   34*  32  35*   BUN   --    < >   --   13  19  23*   CREATININE   --    < >   --   0.70  0.73  0.75   MAGNESIUM  2.4   --    --   2.5   --    --    PHOSPHORUS   --    --   3.4   --    --    --    CALCIUM   --    < >   --   9.5  9.9  9.5    < > = values in this interval not displayed.     Recent Labs      11/13/18   1914   11/14/18   1753  11/15/18   0755  11/16/18   0504   ALTSGPT  8   --    --   9   --    ASTSGOT  6*   --    --   7*   --    ALKPHOSPHAT  68   --    --   61   --    TBILIRUBIN  1.0   --    --   0.7   --    GLUCOSE  84   < >  129*  114*  84    < > = values in this interval not displayed.     Recent Labs      11/13/18 2138  11/14/18   0442   BCSPJ90H  7.28*  7.23*   [id number]  66.8*   77.1*   [id number]  75.4  79.5   NGQU8LIA  92.6*  93.1   ARTHCO3  31*  32*   ARTBE  2  2     DX-CHEST-PORTABLE (1 VIEW)   Final Result      Cardiomegaly with minimal interstitial edema. No pleural effusions.      EC-ECHOCARDIOGRAM COMPLETE W/ CONT   Final Result      DX-CHEST-PORTABLE (1 VIEW)   Final Result      Cardiomegaly is increased compared to 2006. Correlation with echocardiogram is recommended. Pericardial effusion not excluded.      Interstitial prominence likely represents interstitial edema.         ECHO  Grossly normal left ventricular systolic function.  Left ventricular ejection fraction is visually estimated to be 65%.  Probable mild aortic stenosis.  Dilated inferior vena cava without   inspiratory collapse.  Estimated right ventricular systolic pressure is   60 mmHg.    Assessment and Plan:  Hypoxic and hypercapnic resp failure  Presumed KATHIA    Sleep study done in [place] one month ago according to pt    Mom asked to bring in information about her primary MD and sleep center  Obesity  Schizophrenia vs bipolar disorder per chart -   Hx tobacco use - counseled to dc  Pulmonary HTN    Will hopefully be able to set up her bipap if we can obtain the sleep study from [place].  Otherwise an outpatient sleep study will be needed.  Called her Dr. Musa but unable to obtain information.  Have discussed with  to assist in obtaining prior sleep study.

## 2018-11-16 NOTE — RESPIRATORY CARE
COPD EDUCATION by COPD CLINICAL EDUCATOR  11/16/2018  at  12:01 PM by Dayami Nielson     Patient interviewed by COPD education team.  Patient unable to participate in full program.  Short intervention has been conducted.  A comprehensive packet including information about COPD, treatments, and smoking cessation given.

## 2018-11-16 NOTE — PROGRESS NOTES
12hr chart check     Monitor summary:    Rhythm:          SB-SR   ME:0.12   QRS: 0.1   QT: 0.4    2 RN skin check at change of shift

## 2018-11-16 NOTE — PROGRESS NOTES
Internal Medicine Interval Note  Note Author: Yanick Espinal M.D.     Name Lorene Haro     1975   Age/Sex 43 y.o. female   MRN [id number]   Code Status Full Code     After 5PM or if no immediate response to page, please call for cross-coverage  Attending/Team: Donte/ Haim See Patient List for primary contact information  Call [phone] to page    1st Call - Day Intern (R1):   Kylie 2nd Call - Day Sr. Resident (R2/R3):   Eder         Reason for interval visit  (Principal Problem)   Acute hypoxemic respiratory failure (HCC)    HPI: 43 y.o. female, past medical history bipolar disorder, schizophrenia, COPD, morbid obesity, suspected undiagnosed sleep apnea presents for acute hypoxic respiratory failure admission ABG 7., sats 92% on 3 L.  Admitted to floor  and subsequently transferred to ICU , transferred to floor .  Respiratory status improved on BiPAP in ICU.  It was discovered that she is supposed to be using BiPAP, but forgot it in [place] when she moved to [place] 2-3 weeks ago.  Respiratory status has improved while using BiPAP for all naps and sleeping.    Interval Problem Daily Status Update  (24 hours)    11/15 chest x-ray: Cardiomegaly and interstitial minimal opacities without edema, per reading radiologist.    On review of systems, no complaints.  Pertinent negatives: Denies nausea, vomiting, diarrhea, constipation, syncope, palpitations, cough, shortness of breath, chest pain in bowel or bladder habit, changes in appetite.  She reports a complete resolution of her shortness of breath since starting BiPAP..  10 system review of systems otherwise negative.    Consultants/Specialty  Critical care  PCP: Roman Montes M.D.    Disposition  Inpatient for management of acute hypoxic respiratory failure requiring BiPAP.      PMHx:  Past Medical History:   Diagnosis Date   • Bipolar 2 disorder (HCC)    • Other psychological stress    • Schizophrenic disorder (HCC)         PSHx:  Past Surgical History:   Procedure Laterality Date   • CHOLECYSTECTOMY     • COLECTOMY     • HYSTERECTOMY LAPAROSCOPY     • KNEE ARTHROSCOPY         Medications:  No current facility-administered medications on file prior to encounter.      No current outpatient prescriptions on file prior to encounter.       Allergies:  Allergies   Allergen Reactions   • Amoxicillin Rash       Quality Measures  Reviewed items:  EKG reviewed, Labs reviewed, Medications reviewed and Radiology images reviewed  Henao catheter:  No Henao  Central line: No central line  DVT prophylaxis:  Yes            Physical Exam       Vitals:    11/15/18 2336 11/16/18 0515 11/16/18 0815 11/16/18 1230   BP: 108/59 114/64 (!) 98/64 131/66   Pulse: 64 64 69 75   Resp: 16 12 16 20   Temp: 36.7 °C (98 °F) 36.7 °C (98 °F) 36.7 °C (98.1 °F) 37.1 °C (98.7 °F)   TempSrc: Temporal Temporal Temporal Temporal   SpO2: 94% 95% 95% 96%   Weight:       Height:         Body mass index is 54.15 kg/m². Weight: (!) 134.3 kg (296 lb 1.2 oz)  Oxygen Therapy:  Pulse Oximetry: 96 %, O2 (LPM): 4, O2 Delivery: Silicone Nasal Cannula    PHYSICAL EXAM:  Constitutional: OBESE, TIRED appearing female, oriented to person, place, and time and well-developed, well-nourished, and in no distress.   HENT:   Head: Normocephalic and atraumatic.   Eyes: Pupils are equal, round, and reactive to light. EOM are normal.   Neck: Normal range of motion. Neck supple. No JVD present. No tracheal deviation present. No thyromegaly present.   Cardiovascular: Regular rhythm.  Exam reveals no gallop and no friction rub.    No murmur heard.  Pulmonary/Chest: Effort normal and breath sounds normal. No stridor. No respiratory distress. No wheezes. No rales. No tenderness.  OXYGEN via nasal cannula, BIPAP for naps.  Abdominal: Soft. Bowel sounds are normal. No distension and no mass. There is no tenderness. There is no rebound and no guarding.   Musculoskeletal: Normal range of motion. No  edema, tenderness or deformity.   Lymphadenopathy:     No cervical adenopathy.   Neurological: Alert and oriented to person, place, and time. No cranial nerve deficit.   Skin: Skin is warm and dry. No rash noted. No erythema.   Psychiatric: Mood, memory, affect and judgment normal.   Vitals reviewed.      Lab Data Review:         11/16/2018  8:25 AM    Recent Labs      11/13/18   2230   11/14/18   1226  11/14/18   1753  11/15/18   0755  11/16/18   0504   SODIUM   --    < >   --   140  138  138   POTASSIUM   --    < >   --   5.2  4.9  4.4   CHLORIDE   --    < >   --   102  101  100   CO2   --    < >   --   34*  32  35*   BUN   --    < >   --   13  19  23*   CREATININE   --    < >   --   0.70  0.73  0.75   MAGNESIUM  2.4   --    --   2.5   --    --    PHOSPHORUS   --    --   3.4   --    --    --    CALCIUM   --    < >   --   9.5  9.9  9.5    < > = values in this interval not displayed.       Recent Labs      11/13/18   1914   11/14/18   1753  11/15/18   0755  11/16/18   0504   ALTSGPT  8   --    --   9   --    ASTSGOT  6*   --    --   7*   --    ALKPHOSPHAT  68   --    --   61   --    TBILIRUBIN  1.0   --    --   0.7   --    GLUCOSE  84   < >  129*  114*  84    < > = values in this interval not displayed.       Recent Labs      11/14/18   0208  11/15/18   0755  11/16/18   0504   RBC  4.29  4.28  4.08*   HEMOGLOBIN  12.3  12.0  11.4*   HEMATOCRIT  41.4  42.1  39.7   PLATELETCT  215  221  203       Recent Labs      11/13/18   1914  11/14/18   0208  11/15/18   0755  11/16/18   0504   WBC  12.0*  11.5*  12.3*  9.8   NEUTSPOLYS  79.60*  94.40*  88.10*  74.00*   LYMPHOCYTES  13.00*  4.20*  6.20*  17.60*   MONOCYTES  5.30  0.50  5.00  7.40   EOSINOPHILS  1.50  0.00  0.00  0.40   BASOPHILS  0.20  0.20  0.10  0.20   ASTSGOT  6*   --   7*   --    ALTSGPT  8   --   9   --    ALKPHOSPHAT  68   --   61   --    TBILIRUBIN  1.0   --   0.7   --            Assessment/Plan     COPD (chronic obstructive pulmonary disease) (HCC)-  (present on admission)   Assessment & Plan    Plan as above for acute hypoxic respiratory failure   Plan to discharge on BiPAP 12/8, tidal volume 350, FiO2 0.35, 12/min.  Physical therapy recommends skilled nursing for rehabilitation.  Will discuss with patient.      Cardiomegaly   Assessment & Plan    Seen on chest xray, no previous cardiac history per patient   BNP wnl, trace edema no crackles heard but mild edema seen on chest xray  Due to pain upon laying flat/chest tightness and SOB as well as possible pericardial effusion on chest xray, will get echo to evaluate  Patient has very large body habitus and I doubt the TTE will be a great quality but ordered that to start off with      KATHIA and COPD overlap syndrome (HCC)   Assessment & Plan    Apparent KATHIA....  Sleep study from [place], but no result in records.   Question of having had BIPAP before or not (but not for 2-3 weeks).   Pt's mother may stop by with one (depending on hx).   - Place on BIPAP, whenever napping.  -New home BiPAP order, as above.     Hx of  Bipolar affective disorder, and schizophrenia   Assessment & Plan    Denying any depressive symptoms.    Does not appear manic (appears very tired).   Continue home abilify and lithium

## 2018-11-17 LAB
ANION GAP SERPL CALC-SCNC: 2 MMOL/L (ref 0–11.9)
BASOPHILS # BLD AUTO: 0.4 % (ref 0–1.8)
BASOPHILS # BLD: 0.03 K/UL (ref 0–0.12)
BUN SERPL-MCNC: 19 MG/DL (ref 8–22)
CALCIUM SERPL-MCNC: 9.4 MG/DL (ref 8.5–10.5)
CHLORIDE SERPL-SCNC: 101 MMOL/L (ref 96–112)
CO2 SERPL-SCNC: 36 MMOL/L (ref 20–33)
CREAT SERPL-MCNC: 0.73 MG/DL (ref 0.5–1.4)
EOSINOPHIL # BLD AUTO: 0.05 K/UL (ref 0–0.51)
EOSINOPHIL NFR BLD: 0.6 % (ref 0–6.9)
ERYTHROCYTE [DISTWIDTH] IN BLOOD BY AUTOMATED COUNT: 51.5 FL (ref 35.9–50)
FERRITIN SERPL-MCNC: 17.6 NG/ML (ref 10–291)
GLUCOSE SERPL-MCNC: 83 MG/DL (ref 65–99)
HCT VFR BLD AUTO: 39.1 % (ref 37–47)
HEMOCCULT STL QL: NEGATIVE
HGB BLD-MCNC: 11.3 G/DL (ref 12–16)
IMM GRANULOCYTES # BLD AUTO: 0.03 K/UL (ref 0–0.11)
IMM GRANULOCYTES NFR BLD AUTO: 0.4 % (ref 0–0.9)
IRON SATN MFR SERPL: 4 % (ref 15–55)
IRON SERPL-MCNC: 24 UG/DL (ref 40–170)
LYMPHOCYTES # BLD AUTO: 1.7 K/UL (ref 1–4.8)
LYMPHOCYTES NFR BLD: 21.5 % (ref 22–41)
MCH RBC QN AUTO: 27.8 PG (ref 27–33)
MCHC RBC AUTO-ENTMCNC: 28.9 G/DL (ref 33.6–35)
MCV RBC AUTO: 96.3 FL (ref 81.4–97.8)
MONOCYTES # BLD AUTO: 0.6 K/UL (ref 0–0.85)
MONOCYTES NFR BLD AUTO: 7.6 % (ref 0–13.4)
NEUTROPHILS # BLD AUTO: 5.51 K/UL (ref 2–7.15)
NEUTROPHILS NFR BLD: 69.5 % (ref 44–72)
NRBC # BLD AUTO: 0 K/UL
NRBC BLD-RTO: 0 /100 WBC
PLATELET # BLD AUTO: 197 K/UL (ref 164–446)
PMV BLD AUTO: 11.3 FL (ref 9–12.9)
POTASSIUM SERPL-SCNC: 4.5 MMOL/L (ref 3.6–5.5)
RBC # BLD AUTO: 4.06 M/UL (ref 4.2–5.4)
SODIUM SERPL-SCNC: 139 MMOL/L (ref 135–145)
TIBC SERPL-MCNC: 554 UG/DL (ref 250–450)
WBC # BLD AUTO: 7.9 K/UL (ref 4.8–10.8)

## 2018-11-17 PROCEDURE — 700102 HCHG RX REV CODE 250 W/ 637 OVERRIDE(OP): Performed by: STUDENT IN AN ORGANIZED HEALTH CARE EDUCATION/TRAINING PROGRAM

## 2018-11-17 PROCEDURE — 83550 IRON BINDING TEST: CPT

## 2018-11-17 PROCEDURE — 5A09357 ASSISTANCE WITH RESPIRATORY VENTILATION, LESS THAN 24 CONSECUTIVE HOURS, CONTINUOUS POSITIVE AIRWAY PRESSURE: ICD-10-PCS | Performed by: HOSPITALIST

## 2018-11-17 PROCEDURE — 700111 HCHG RX REV CODE 636 W/ 250 OVERRIDE (IP): Performed by: INTERNAL MEDICINE

## 2018-11-17 PROCEDURE — 36415 COLL VENOUS BLD VENIPUNCTURE: CPT

## 2018-11-17 PROCEDURE — 99232 SBSQ HOSP IP/OBS MODERATE 35: CPT | Performed by: INTERNAL MEDICINE

## 2018-11-17 PROCEDURE — A9270 NON-COVERED ITEM OR SERVICE: HCPCS | Performed by: STUDENT IN AN ORGANIZED HEALTH CARE EDUCATION/TRAINING PROGRAM

## 2018-11-17 PROCEDURE — 85025 COMPLETE CBC W/AUTO DIFF WBC: CPT

## 2018-11-17 PROCEDURE — 82728 ASSAY OF FERRITIN: CPT

## 2018-11-17 PROCEDURE — 700111 HCHG RX REV CODE 636 W/ 250 OVERRIDE (IP): Performed by: STUDENT IN AN ORGANIZED HEALTH CARE EDUCATION/TRAINING PROGRAM

## 2018-11-17 PROCEDURE — 82272 OCCULT BLD FECES 1-3 TESTS: CPT

## 2018-11-17 PROCEDURE — 99232 SBSQ HOSP IP/OBS MODERATE 35: CPT | Mod: GC | Performed by: HOSPITALIST

## 2018-11-17 PROCEDURE — 770020 HCHG ROOM/CARE - TELE (206)

## 2018-11-17 PROCEDURE — 80048 BASIC METABOLIC PNL TOTAL CA: CPT

## 2018-11-17 PROCEDURE — 94660 CPAP INITIATION&MGMT: CPT

## 2018-11-17 PROCEDURE — 83540 ASSAY OF IRON: CPT

## 2018-11-17 RX ADMIN — BUDESONIDE AND FORMOTEROL FUMARATE DIHYDRATE 2 PUFF: 160; 4.5 AEROSOL RESPIRATORY (INHALATION) at 05:52

## 2018-11-17 RX ADMIN — LITHIUM CARBONATE 300 MG: 300 TABLET ORAL at 05:54

## 2018-11-17 RX ADMIN — BUDESONIDE AND FORMOTEROL FUMARATE DIHYDRATE 2 PUFF: 160; 4.5 AEROSOL RESPIRATORY (INHALATION) at 17:33

## 2018-11-17 RX ADMIN — STANDARDIZED SENNA CONCENTRATE AND DOCUSATE SODIUM 2 TABLET: 8.6; 5 TABLET, FILM COATED ORAL at 05:53

## 2018-11-17 RX ADMIN — AZITHROMYCIN MONOHYDRATE 250 MG: 250 TABLET ORAL at 05:52

## 2018-11-17 RX ADMIN — ALBUTEROL SULFATE 2 PUFF: 90 AEROSOL, METERED RESPIRATORY (INHALATION) at 06:00

## 2018-11-17 RX ADMIN — BENZTROPINE MESYLATE 1 MG: 1 TABLET ORAL at 05:53

## 2018-11-17 RX ADMIN — NYSTATIN: 100000 POWDER TOPICAL at 05:54

## 2018-11-17 RX ADMIN — NYSTATIN: 100000 POWDER TOPICAL at 17:33

## 2018-11-17 RX ADMIN — DOXEPIN HYDROCHLORIDE 25 MG: 25 CAPSULE ORAL at 20:18

## 2018-11-17 RX ADMIN — LITHIUM CARBONATE 600 MG: 300 TABLET ORAL at 20:18

## 2018-11-17 RX ADMIN — ARIPIPRAZOLE 30 MG: 10 TABLET ORAL at 05:52

## 2018-11-17 RX ADMIN — ENOXAPARIN SODIUM 40 MG: 100 INJECTION SUBCUTANEOUS at 05:51

## 2018-11-17 RX ADMIN — PREDNISONE 40 MG: 20 TABLET ORAL at 05:54

## 2018-11-17 RX ADMIN — ALBUTEROL SULFATE 2 PUFF: 90 AEROSOL, METERED RESPIRATORY (INHALATION) at 17:34

## 2018-11-17 ASSESSMENT — PAIN SCALES - GENERAL
PAINLEVEL_OUTOF10: 0

## 2018-11-17 NOTE — PROGRESS NOTES
Pt titrated to 1-2 L O2 today and sitting up in bariatric wheelchair for meals. Able to ambulate to bathroom with SBA and walker.

## 2018-11-17 NOTE — PROGRESS NOTES
Pulmonary Progress Note    PATIENT: Lorene Haro  MRN: [id number]  : 1975    Admission date: 2018    ASSESSMENT/PLAN:  Principal Problem (Resolved):    Acute hypoxemic respiratory failure (HCC) POA: Yes  Active Problems:    COPD (chronic obstructive pulmonary disease) (HCC) POA: Yes    Cardiomegaly POA: Unknown    Hx of  Bipolar affective disorder, and schizophrenia POA: Unknown    Class 3 severe obesity due to excess calories with body mass index (BMI) of 50.0 to 59.9 in adult (HCC) POA: Unknown    KATHIA and COPD overlap syndrome (HCC) POA: Unknown    44 yo female with KATHIA, morbid obesity, hypoxic and hypercapnic respiratory failure, pulmonary HTN likely secondarily.  Also seems to have learning delays as well as prior psychiatric diagnosis.  Have called her mother multiple times to obtain more information.  If sleep study cannot be located pt likely will need an outpatient sleep study to obtain bipap.    She might qualify due to her pulmonary HTN, hypercapnea with PaCO2 of 72 but this is unlikely.   Plan to sign off but please call as needed for assistance       SUBJECTIVE:  Feels okay    MEDICATIONS:    Current Facility-Administered Medications:   •  budesonide-formoterol (SYMBICORT) 160-4.5 MCG/ACT inhaler 2 Puff, 2 Puff, Inhalation, BID, David Abdi M.D., 2 Puff at 18 0552  •  pneumococcal vaccine (PNEUMOVAX-23) injection 25 mcg, 0.5 mL, Intramuscular, Once PRN, David Abdi M.D.  •  nystatin (MYCOSTATIN) powder, , Topical, BID, Timothy Hennessy M.D.  •  albuterol inhaler 2 Puff, 2 Puff, Inhalation, BID, David Abdi M.D., 2 Puff at 18 0600  •  ipratropium-albuterol (DUONEB) nebulizer solution, 3 mL, Nebulization, Q6HRS PRN (RT), David Abdi M.D.  •  predniSONE (DELTASONE) tablet 40 mg, 40 mg, Oral, DAILY, David Abdi M.D., 40 mg at 18 0554  •  senna-docusate (PERICOLACE or SENOKOT S) 8.6-50 MG per tablet 2 Tab, 2 Tab, Oral, BID, 2 Tab at 18  "0553 **AND** polyethylene glycol/lytes (MIRALAX) PACKET 1 Packet, 1 Packet, Oral, QDAY PRN **AND** magnesium hydroxide (MILK OF MAGNESIA) suspension 30 mL, 30 mL, Oral, QDAY PRN **AND** bisacodyl (DULCOLAX) suppository 10 mg, 10 mg, Rectal, QDAY PRN, Love Angeles M.D.  •  Respiratory Care per Protocol, , Nebulization, Continuous RT, Love Angeles M.D.  •  enoxaparin (LOVENOX) inj 40 mg, 40 mg, Subcutaneous, DAILY, Love Angeles M.D., 40 mg at 11/17/18 0551  •  labetalol (NORMODYNE,TRANDATE) injection 10 mg, 10 mg, Intravenous, Q4HRS PRN, Love Angeles M.D.  •  acetaminophen (TYLENOL) tablet 650 mg, 650 mg, Oral, Q6HRS PRN, Love Angeles M.D., 650 mg at 11/16/18 2011  •  ARIPiprazole (ABILIFY) tablet 30 mg, 30 mg, Oral, QAM, Love Angeles M.D., 30 mg at 11/17/18 0552  •  benztropine (COGENTIN) tablet 1 mg, 1 mg, Oral, QAM, Love Angeles M.D., 1 mg at 11/17/18 0553  •  doxepin (SINEQUAN) capsule 25 mg, 25 mg, Oral, QHS, Love Angeles M.D., 25 mg at 11/16/18 2111  •  lithium (ESKALITH) tablet 300 mg, 300 mg, Oral, QAM, 300 mg at 11/17/18 0554 **AND** lithium (ESKALITH) tablet 600 mg, 600 mg, Oral, QHS, Yeison Salvador M.D., 600 mg at 11/16/18 2111    ROS  Eating   Denies pain    OBJECTIVE:    /60   Pulse 64   Temp 36.6 °C (97.8 °F) (Temporal)   Resp 20   Ht 1.575 m (5' 2\")   Wt (!) 138.7 kg (305 lb 12.5 oz)   SpO2 92%   Breastfeeding? No   BMI 55.93 kg/m²   General: obese, slow female sitting in bed  HEENT: Sclera clear, conjunctiva pink, oropharynx clear, mucus membranes moist  Heart: regular  Lungs: clear  Abdomen: obese  Extremities: no clubbing cyanosis; 1+ edema  Neuro: slow understanding and memory problems  Skin: sacral erythem    DATA REVIEW:  LABORATORY DATA:    Recent Labs      11/15/18   0755  11/16/18   0504  11/17/18   0235   WBC  12.3*  9.8  7.9   RBC  4.28  4.08*  4.06*   HEMOGLOBIN  12.0  11.4*  11.3*   HEMATOCRIT  42.1  39.7  39.1   MCV  98.4*  97.3  96.3 "   MCH  28.0  27.9  27.8   RDW  52.6*  51.7*  51.5*   PLATELETCT  221  203  197   MPV  11.6  11.2  11.3   NEUTSPOLYS  88.10*  74.00*  69.50   LYMPHOCYTES  6.20*  17.60*  21.50*   MONOCYTES  5.00  7.40  7.60   EOSINOPHILS  0.00  0.40  0.60   BASOPHILS  0.10  0.20  0.40      Recent Labs      11/15/18   0755  11/16/18   0504  11/17/18   0235   SODIUM  138  138  139   POTASSIUM  4.9  4.4  4.5   CHLORIDE  101  100  101   CO2  32  35*  36*   GLUCOSE  114*  84  83   BUN  19  23*  19   CREATININE  0.73  0.75  0.73   CALCIUM  9.9  9.5  9.4             Lab Results  Component Value Date/Time   XFIOT57V 7.23 (LL) 11/14/2018 0442   [id number] 77.1 (HH) 11/14/2018 0442   BCYJZ911P 79.5 11/14/2018 0442   FWXS6GRW 93.1 11/14/2018 0442   ARTHCO3 32 (H) 11/14/2018 0442   ARTBE 2 11/14/2018 0442   BDYTEMP see below 11/15/2018 0059          Assessment and plan as above      Jimbo Dolan M.D.

## 2018-11-17 NOTE — PROGRESS NOTES
Internal Medicine Interval Note  Note Author: Yanick Espinal M.D.     Name Lorene Haro     1975   Age/Sex 43 y.o. female   MRN [id number]   Code Status Full Code     After 5PM or if no immediate response to page, please call for cross-coverage  Attending/Team: Donte/ Haim See Patient List for primary contact information  Call [phone] to page    1st Call - Day Intern (R1):   Kylie 2nd Call - Day Sr. Resident (R2/R3):   Eder         Reason for interval visit  (Principal Problem)   Acute hypoxemic respiratory failure (HCC)    HPI: 43 y.o. female, past medical history bipolar disorder, schizophrenia, COPD, morbid obesity, suspected undiagnosed sleep apnea presents for acute hypoxic respiratory failure admission ABG 7., sats 92% on 3 L.  Admitted to floor  and subsequently transferred to ICU , transferred to floor .  Respiratory status improved on BiPAP in ICU.  It was discovered that she is supposed to be using BiPAP, but forgot it in [place] when she moved to [place] 2-3 weeks ago.  Respiratory status has improved while using BiPAP for all naps and sleeping.    Interval Problem Daily Status Update  (24 hours)    Patient is up on O2 and ambulates with front wheel walker.  Physical therapy recommends skilled nursing for rehab.    On review of systems, no complaints.  Pertinent negatives: Denies nausea, vomiting, diarrhea, constipation, syncope, palpitations, cough, shortness of breath, chest pain, changes in bowel or bladder habit, changes in appetite.  10 system review of systems otherwise negative.    Consultants/Specialty  Critical care  PCP: Roman Montes M.D.    Disposition  Inpatient for management of acute hypoxic respiratory failure requiring BiPAP.      PMHx:  Past Medical History:   Diagnosis Date   • Bipolar 2 disorder (HCC)    • Other psychological stress    • Schizophrenic disorder (HCC)        PSHx:  Past Surgical History:   Procedure Laterality Date   •  CHOLECYSTECTOMY     • COLECTOMY     • HYSTERECTOMY LAPAROSCOPY     • KNEE ARTHROSCOPY         Medications:  No current facility-administered medications on file prior to encounter.      No current outpatient prescriptions on file prior to encounter.       Allergies:  Allergies   Allergen Reactions   • Amoxicillin Rash       Quality Measures  Reviewed items:  EKG reviewed, Labs reviewed, Medications reviewed and Radiology images reviewed  Henao catheter:  No Henao  Central line: No central line  DVT prophylaxis:  Yes            Physical Exam       Vitals:    11/16/18 2001 11/17/18 0002 11/17/18 0400 11/17/18 0830   BP: 120/66 105/62 111/53 128/60   Pulse: 71 64 61 64   Resp: 19 20 18 20   Temp: 36.4 °C (97.6 °F) 36.1 °C (96.9 °F) 36.2 °C (97.1 °F) 36.6 °C (97.8 °F)   TempSrc: Temporal Temporal Temporal Temporal   SpO2: 93% 92% 98% 92%   Weight:       Height:         Body mass index is 55.93 kg/m². Weight: (!) 138.7 kg (305 lb 12.5 oz)  Oxygen Therapy:  Pulse Oximetry: 92 %, O2 (LPM): 2, O2 Delivery: Silicone Nasal Cannula    PHYSICAL EXAM: Stable since yesterday  Constitutional: OBESE, well-developed, well-nourished, and in no distress.   HENT:   Head: Normocephalic and atraumatic.   Eyes: Pupils are equal, round, and reactive to light. EOM are normal.   Neck: Normal range of motion. Neck supple. No JVD present. No tracheal deviation present. No thyromegaly present.   Cardiovascular: Regular rhythm.  Exam reveals no gallop and no friction rub.    No murmur heard.  Pulmonary/Chest: Effort normal and breath sounds normal. No stridor. No respiratory distress. No wheezes. No rales. No tenderness.  OXYGEN via nasal cannula, BIPAP for naps.  Abdominal: Soft. Bowel sounds are normal. No distension and no mass. There is no tenderness. There is no rebound and no guarding.   Musculoskeletal: Normal range of motion. No edema, tenderness or deformity.   Lymphadenopathy:     No cervical adenopathy.   Neurological: Alert and  oriented to person, place, and time. No cranial nerve deficit.   Skin: Skin is warm and dry. No rash noted. No erythema.   Psychiatric: Mood, memory, affect and judgment normal.   Vitals reviewed.      Lab Data Review:         11/16/2018  8:25 AM    Recent Labs      11/14/18   1753  11/15/18   0755  11/16/18   0504  11/17/18   0235   SODIUM  140  138  138  139   POTASSIUM  5.2  4.9  4.4  4.5   CHLORIDE  102  101  100  101   CO2  34*  32  35*  36*   BUN  13  19  23*  19   CREATININE  0.70  0.73  0.75  0.73   MAGNESIUM  2.5   --    --    --    CALCIUM  9.5  9.9  9.5  9.4       Recent Labs      11/15/18   0755  11/16/18   0504  11/17/18   0235   ALTSGPT  9   --    --    ASTSGOT  7*   --    --    ALKPHOSPHAT  61   --    --    TBILIRUBIN  0.7   --    --    GLUCOSE  114*  84  83       Recent Labs      11/15/18   0755  11/16/18   0504  11/17/18   0235   RBC  4.28  4.08*  4.06*   HEMOGLOBIN  12.0  11.4*  11.3*   HEMATOCRIT  42.1  39.7  39.1   PLATELETCT  221  203  197       Recent Labs      11/15/18   0755  11/16/18   0504  11/17/18   0235   WBC  12.3*  9.8  7.9   NEUTSPOLYS  88.10*  74.00*  69.50   LYMPHOCYTES  6.20*  17.60*  21.50*   MONOCYTES  5.00  7.40  7.60   EOSINOPHILS  0.00  0.40  0.60   BASOPHILS  0.10  0.20  0.40   ASTSGOT  7*   --    --    ALTSGPT  9   --    --    ALKPHOSPHAT  61   --    --    TBILIRUBIN  0.7   --    --            Assessment/Plan     COPD (chronic obstructive pulmonary disease) (HCC)- (present on admission)   Assessment & Plan    Plan to discharge on BiPAP 12/8, tidal volume 350, FiO2 0.35, 12/min.  Physical therapy recommends skilled nursing for rehabilitation.  Will discuss with patient.  Maintain sats 88-92%.     Cardiomegaly   Assessment & Plan    Seen on chest xray, no previous cardiac history per patient   BNP wnl, trace edema no crackles heard but mild edema seen on chest xray  Pain upon laying flat/chest tightness and SOB as well as possible pericardial effusion on chest xray,  confirmatory echo negative.  Patient has very large body habitus and I doubt the TTE will be a great quality but ordered that to start off with      KATHIA and COPD overlap syndrome (HCC)   Assessment & Plan    Apparent KATHIA....  Sleep study from [place], but no result in records.   Question of having had BIPAP before or not (but not for 2-3 weeks).   Pt's mother may stop by with one (depending on hx).   - Place on BIPAP, whenever napping.  -New home BiPAP order, as above.     Hx of  Bipolar affective disorder, and schizophrenia   Assessment & Plan    Denying any depressive symptoms.    Does not appear manic.  Normal affect.  Continue home abilify and lithium

## 2018-11-17 NOTE — PROGRESS NOTES
Received report from night staff. Assumed pt care. Pain level 0/10. AOX4. POC discussed. Tele monitor in place. Call light within reach, bed in lowest position, and personal items accessible.

## 2018-11-17 NOTE — DISCHARGE PLANNING
Medical Social Work     LSW received update that pt is needing home bi-pap. Pt apparently had a sleep study done within the last month or two in [place], but does not know where this was done. Pt stated her mother might know. LSW left  for pt's mother requesting a call to see if she knows where we can obtain records from.

## 2018-11-17 NOTE — PROGRESS NOTES
RN informed MD of pt refusal of CPAP overnight. Pt educated on need of CPAP at night by MD. Pt needs to have CPAP on when sleeping and may not refuse this treatment per Dr Whitmore. Pt demonstrates understanding at this time.

## 2018-11-17 NOTE — CARE PLAN
Problem: Communication  Goal: The ability to communicate needs accurately and effectively will improve  Pt communicating needs appropriately. Oriented to the call light and to call for assistance.       Problem: Infection  Goal: Will remain free from infection  Pt has no active s/s of infection at this time.

## 2018-11-18 PROBLEM — D64.9 ANEMIA: Status: ACTIVE | Noted: 2018-11-18

## 2018-11-18 PROCEDURE — A9270 NON-COVERED ITEM OR SERVICE: HCPCS | Performed by: STUDENT IN AN ORGANIZED HEALTH CARE EDUCATION/TRAINING PROGRAM

## 2018-11-18 PROCEDURE — 700111 HCHG RX REV CODE 636 W/ 250 OVERRIDE (IP): Performed by: STUDENT IN AN ORGANIZED HEALTH CARE EDUCATION/TRAINING PROGRAM

## 2018-11-18 PROCEDURE — 770020 HCHG ROOM/CARE - TELE (206)

## 2018-11-18 PROCEDURE — 700102 HCHG RX REV CODE 250 W/ 637 OVERRIDE(OP): Performed by: STUDENT IN AN ORGANIZED HEALTH CARE EDUCATION/TRAINING PROGRAM

## 2018-11-18 PROCEDURE — 99232 SBSQ HOSP IP/OBS MODERATE 35: CPT | Mod: GC | Performed by: HOSPITALIST

## 2018-11-18 PROCEDURE — 700111 HCHG RX REV CODE 636 W/ 250 OVERRIDE (IP): Performed by: INTERNAL MEDICINE

## 2018-11-18 PROCEDURE — 94660 CPAP INITIATION&MGMT: CPT

## 2018-11-18 RX ADMIN — NYSTATIN: 100000 POWDER TOPICAL at 04:48

## 2018-11-18 RX ADMIN — PREDNISONE 40 MG: 20 TABLET ORAL at 04:48

## 2018-11-18 RX ADMIN — IRON SUCROSE 200 MG: 20 INJECTION, SOLUTION INTRAVENOUS at 15:29

## 2018-11-18 RX ADMIN — ARIPIPRAZOLE 30 MG: 10 TABLET ORAL at 04:49

## 2018-11-18 RX ADMIN — ALBUTEROL SULFATE 2 PUFF: 90 AEROSOL, METERED RESPIRATORY (INHALATION) at 06:00

## 2018-11-18 RX ADMIN — ENOXAPARIN SODIUM 40 MG: 100 INJECTION SUBCUTANEOUS at 04:48

## 2018-11-18 RX ADMIN — NYSTATIN: 100000 POWDER TOPICAL at 17:47

## 2018-11-18 RX ADMIN — BUDESONIDE AND FORMOTEROL FUMARATE DIHYDRATE 2 PUFF: 160; 4.5 AEROSOL RESPIRATORY (INHALATION) at 04:48

## 2018-11-18 RX ADMIN — LITHIUM CARBONATE 300 MG: 300 TABLET ORAL at 04:49

## 2018-11-18 RX ADMIN — DOXEPIN HYDROCHLORIDE 25 MG: 25 CAPSULE ORAL at 21:03

## 2018-11-18 RX ADMIN — BUDESONIDE AND FORMOTEROL FUMARATE DIHYDRATE 2 PUFF: 160; 4.5 AEROSOL RESPIRATORY (INHALATION) at 17:47

## 2018-11-18 RX ADMIN — BENZTROPINE MESYLATE 1 MG: 1 TABLET ORAL at 04:49

## 2018-11-18 RX ADMIN — ALBUTEROL SULFATE 2 PUFF: 90 AEROSOL, METERED RESPIRATORY (INHALATION) at 17:47

## 2018-11-18 RX ADMIN — LITHIUM CARBONATE 600 MG: 300 TABLET ORAL at 21:02

## 2018-11-18 ASSESSMENT — PAIN SCALES - GENERAL
PAINLEVEL_OUTOF10: 0

## 2018-11-18 NOTE — DISCHARGE PLANNING
LSW spoke with patient at bedside to complete assessment. Patient lives with her sister and mom in [place]. Patient's mother is [place] ([phone]). Patient does not use any DME. Patient reports she does get OP mental health treatment.   LSW spoke with patient about 02 needs. Patient agreed to send referral to [facility] and reported she will be doing an OP sleep study upon d/c.  LSW faxed choice to [facility].       Care Transition Team Assessment    Information Source  Orientation : Oriented x 4  Information Given By: Patient  Who is responsible for making decisions for patient? : Patient    Readmission Evaluation  Is this a readmission?: No    Elopement Risk  Legal Hold: No  Ambulatory or Self Mobile in Wheelchair: Yes  Disoriented: No  Psychiatric Symptoms: None  History of Wandering: No  Elopement this Admit: No  Vocalizing Wanting to Leave: No  Displays Behaviors, Body Language Wanting to Leave: No-Not at Risk for Elopement  Elopement Risk: Not at Risk for Elopement    Interdisciplinary Discharge Planning  Lives with - Patient's Self Care Capacity: Parents  Patient or legal guardian wants to designate a caregiver (see row info): No  Housing / Facility: 1 Story Apartment / Condo  Prior Services: None    Discharge Preparedness  What is your plan after discharge?: Uncertain - pending medical team collaboration  What are your discharge supports?: Parent, Sibling  Prior Functional Level: Independent with Activities of Daily Living    Functional Assesment  Prior Functional Level: Independent with Activities of Daily Living    Finances  Financial Barriers to Discharge: (P) No  Prescription Coverage: (P) Yes              Advance Directive  Advance Directive?: (P) None  Advance Directive offered?: (P) AD Booklet given         Psychological Assessment  History of Substance Abuse: (P) None  History of Psychiatric Problems: (P) Yes (Recieves OP counseling services)  Non-compliant with Treatment: (P) No    Discharge Risks or  Barriers  Discharge risks or barriers?: (P) Mental health, Post-acute placement / services    Anticipated Discharge Information  Anticipated discharge disposition: (P)  (May need SNF or OP PT)

## 2018-11-18 NOTE — CARE PLAN
Problem: Mobility  Goal: Risk for activity intolerance will decrease    Intervention: Assess and monitor signs of activity intolerance  Uses a wheel walker when getting up. Stand by assist.

## 2018-11-18 NOTE — DISCHARGE PLANNING
Anticipated Discharge Disposition: SNF    Action: LSW spoke with patient regarding SNF. Patient stated she has never been but was open to d/c to SNF if recommended. Patient provided consent for LSW to blanket referral.    Barriers to Discharge: SNF acceptance     Plan: LSW to follow

## 2018-11-18 NOTE — CARE PLAN
Problem: Respiratory:  Goal: Respiratory status will improve    Intervention: Assess and monitor pulmonary status  Bi-pap on when sleeping.

## 2018-11-18 NOTE — PROGRESS NOTES
Received report from Madeleine GREENE, pt is on monitor, resting in bed, bed alarm in place, call light within reach. POC discussed, no further questions at this time. Will continue to monitor.

## 2018-11-18 NOTE — ASSESSMENT & PLAN NOTE
Chronic anemia.  Patient denies vaginal bleeding, blood in stool. FIT test pending.  Iron studies came back very low for iron.  IV iron per pharmacy.

## 2018-11-18 NOTE — DISCHARGE PLANNING
Agency/Facility Name: [facility]  Spoke To: Sherin  Outcome: [place] wanting to verify if patient requires O2. Per [place], BIPAP will require auth through insurance.  Elisha(SHIRLEY) notified.

## 2018-11-18 NOTE — DISCHARGE PLANNING
Received Choice form at [phone]  Agency/Facility Name: [facility]  Referral sent per Choice form @ [phone]

## 2018-11-18 NOTE — DISCHARGE PLANNING
Received Choice form at 8340  Agency/Facility Name: [facility]  Referral sent per Choice form @ 1814

## 2018-11-18 NOTE — PROGRESS NOTES
Internal Medicine Interval Note  Note Author: Yanick Espinal M.D.     Name Lorene Haro     1975   Age/Sex 43 y.o. female   MRN [id number]   Code Status Full Code     After 5PM or if no immediate response to page, please call for cross-coverage  Attending/Team: Donte/ Haim See Patient List for primary contact information  Call [phone] to page    1st Call - Day Intern (R1):   Kylie 2nd Call - Day Sr. Resident (R2/R3):   Eder         Reason for interval visit  (Principal Problem)   Acute hypoxemic respiratory failure (HCC)    HPI: 43 y.o. female, past medical history bipolar disorder, schizophrenia, COPD, morbid obesity, suspected undiagnosed sleep apnea presents for acute hypoxic respiratory failure admission ABG 7., sats 92% on 3 L.  Admitted to floor  and subsequently transferred to ICU , transferred to floor .  Respiratory status improved on BiPAP in ICU.  It was discovered that she is supposed to be using BiPAP, but forgot it in [place] when she moved to [place] 2-3 weeks ago.  Respiratory status has improved while using BiPAP for all naps and sleeping.    Interval Problem Daily Status Update  (24 hours)    Patient is up on O2 and ambulates with front wheel walker.  Physical therapy recommends skilled nursing for rehab.  Pending placement.    On review of systems, no complaints.  Pertinent negatives: Denies nausea, vomiting, diarrhea, constipation, syncope, palpitations, cough, shortness of breath, chest pain, changes in bowel or bladder habit, changes in appetite.  10 system review of systems otherwise negative.    Consultants/Specialty  Critical care  PCP: Rmoan Montes M.D.    Disposition  Inpatient for management of acute hypoxic respiratory failure requiring BiPAP.      PMHx:  Past Medical History:   Diagnosis Date   • Bipolar 2 disorder (HCC)    • Other psychological stress    • Schizophrenic disorder (HCC)        PSHx:  Past Surgical History:   Procedure  Laterality Date   • CHOLECYSTECTOMY     • COLECTOMY     • HYSTERECTOMY LAPAROSCOPY     • KNEE ARTHROSCOPY         Medications:  No current facility-administered medications on file prior to encounter.      No current outpatient prescriptions on file prior to encounter.       Allergies:  Allergies   Allergen Reactions   • Amoxicillin Rash       Quality Measures  Reviewed items:  EKG reviewed, Labs reviewed, Medications reviewed and Radiology images reviewed  Henao catheter:  No Henao  Central line: No central line  DVT prophylaxis:  Yes            Physical Exam       Vitals:    11/18/18 0200 11/18/18 0426 11/18/18 0900 11/18/18 1300   BP: 131/70 122/64 133/76 121/73   Pulse: 60 65 65 (!) 54   Resp: 16 16 18 18   Temp: 36.8 °C (98.2 °F) 37 °C (98.6 °F) 36.6 °C (97.8 °F) 36.5 °C (97.7 °F)   TempSrc: Temporal Temporal Temporal Temporal   SpO2: 92% 95% 93% 97%   Weight:       Height:         Body mass index is 55.81 kg/m². Weight: (!) 138.4 kg (305 lb 1.9 oz)  Oxygen Therapy:  Pulse Oximetry: 97 %, O2 (LPM): 2, O2 Delivery: Silicone Nasal Cannula    PHYSICAL EXAM: Stable since yesterday  Constitutional: OBESE, well-developed, well-nourished, and in no distress.   HENT:   Head: Normocephalic and atraumatic.   Eyes: Pupils are equal, round, and reactive to light. EOM are normal.   Neck: Normal range of motion. Neck supple. No JVD present. No tracheal deviation present. No thyromegaly present.   Cardiovascular: Regular rhythm.  Exam reveals no gallop and no friction rub.    No murmur heard.  Pulmonary/Chest: Effort normal and breath sounds normal. No stridor. No respiratory distress. No wheezes. No rales. No tenderness.  OXYGEN via nasal cannula, BIPAP for naps.  Abdominal: Soft. Bowel sounds are normal. No distension and no mass. There is no tenderness. There is no rebound and no guarding.   Musculoskeletal: Normal range of motion. No edema, tenderness or deformity.   Lymphadenopathy:     No cervical adenopathy.    Neurological: Alert and oriented to person, place, and time. No cranial nerve deficit.   Skin: Skin is warm and dry. No rash noted. No erythema.   Psychiatric: Mood, memory, affect and judgment normal.   Vitals reviewed.      Lab Data Review:         11/16/2018  8:25 AM    Recent Labs      11/16/18   0504  11/17/18   0235   SODIUM  138  139   POTASSIUM  4.4  4.5   CHLORIDE  100  101   CO2  35*  36*   BUN  23*  19   CREATININE  0.75  0.73   CALCIUM  9.5  9.4       Recent Labs      11/16/18   0504  11/17/18   0235   GLUCOSE  84  83       Recent Labs      11/16/18   0504  11/17/18   0235  11/17/18   1240   RBC  4.08*  4.06*   --    HEMOGLOBIN  11.4*  11.3*   --    HEMATOCRIT  39.7  39.1   --    PLATELETCT  203  197   --    IRON   --    --   24*   FERRITIN   --    --   17.6   TOTIRONBC   --    --   554*       Recent Labs      11/16/18   0504  11/17/18   0235   WBC  9.8  7.9   NEUTSPOLYS  74.00*  69.50   LYMPHOCYTES  17.60*  21.50*   MONOCYTES  7.40  7.60   EOSINOPHILS  0.40  0.60   BASOPHILS  0.20  0.40           Assessment/Plan     COPD (chronic obstructive pulmonary disease) (HCC)- (present on admission)   Assessment & Plan    Plan to discharge on BiPAP 12/8, tidal volume 350, FiO2 0.35, 12/min.  Physical therapy recommends skilled nursing for rehabilitation.  Will discuss with patient.  Maintain sats 88-92%.  Pulmonology consult: She will likely need a sleep study in the outpatient setting to optimize BiPAP settings.  She reports having had a sleep study recently, but is unable to produce documentation and only knows that [facility] was [facility].  We appreciate social work efforts to attempt to track down documentation of the sleep study.     Cardiomegaly   Assessment & Plan    Seen on chest xray, no previous cardiac history per patient  BNP normal on admission, repeat pending  Pain upon laying flat/chest tightness and SOB as well as possible pericardial effusion on chest xray, confirmatory echo  negative.  TTE demonstrated EF 65% with normal wall motion, wall motion and valvular anatomy difficult to assess due to body habitus.     KATHIA and COPD overlap syndrome (HCC)   Assessment & Plan    Apparent KATHIA....  Sleep study from [place], but no result in records.   Question of having had BIPAP before or not (but not for 2-3 weeks).   Pt's mother may stop by with one (depending on hx).   - Place on BIPAP, whenever napping.  -New home BiPAP order, as above.  -We will likely need a sleep study as an outpatient for BiPAP settings.     Hx of  Bipolar affective disorder, and schizophrenia   Assessment & Plan    Denying any depressive symptoms.    Does not appear manic.  Normal affect.  Continue home abilify and lithium

## 2018-11-19 ENCOUNTER — PATIENT OUTREACH (OUTPATIENT)
Dept: HEALTH INFORMATION MANAGEMENT | Facility: OTHER | Age: 43
End: 2018-11-19

## 2018-11-19 PROCEDURE — 80178 ASSAY OF LITHIUM: CPT

## 2018-11-19 PROCEDURE — 770020 HCHG ROOM/CARE - TELE (206)

## 2018-11-19 PROCEDURE — 99231 SBSQ HOSP IP/OBS SF/LOW 25: CPT | Mod: GC | Performed by: HOSPITALIST

## 2018-11-19 PROCEDURE — 5A09357 ASSISTANCE WITH RESPIRATORY VENTILATION, LESS THAN 24 CONSECUTIVE HOURS, CONTINUOUS POSITIVE AIRWAY PRESSURE: ICD-10-PCS | Performed by: HOSPITALIST

## 2018-11-19 PROCEDURE — 36415 COLL VENOUS BLD VENIPUNCTURE: CPT

## 2018-11-19 PROCEDURE — 700111 HCHG RX REV CODE 636 W/ 250 OVERRIDE (IP): Performed by: STUDENT IN AN ORGANIZED HEALTH CARE EDUCATION/TRAINING PROGRAM

## 2018-11-19 PROCEDURE — 700102 HCHG RX REV CODE 250 W/ 637 OVERRIDE(OP): Performed by: STUDENT IN AN ORGANIZED HEALTH CARE EDUCATION/TRAINING PROGRAM

## 2018-11-19 PROCEDURE — A9270 NON-COVERED ITEM OR SERVICE: HCPCS | Performed by: STUDENT IN AN ORGANIZED HEALTH CARE EDUCATION/TRAINING PROGRAM

## 2018-11-19 PROCEDURE — 700111 HCHG RX REV CODE 636 W/ 250 OVERRIDE (IP): Performed by: HOSPITALIST

## 2018-11-19 PROCEDURE — 94660 CPAP INITIATION&MGMT: CPT

## 2018-11-19 PROCEDURE — 700102 HCHG RX REV CODE 250 W/ 637 OVERRIDE(OP): Performed by: HOSPITALIST

## 2018-11-19 PROCEDURE — A9270 NON-COVERED ITEM OR SERVICE: HCPCS | Performed by: HOSPITALIST

## 2018-11-19 RX ORDER — ONDANSETRON 2 MG/ML
4 INJECTION INTRAMUSCULAR; INTRAVENOUS EVERY 4 HOURS PRN
Status: DISCONTINUED | OUTPATIENT
Start: 2018-11-19 | End: 2018-11-21 | Stop reason: HOSPADM

## 2018-11-19 RX ORDER — TRAMADOL HYDROCHLORIDE 50 MG/1
50 TABLET ORAL ONCE
Status: COMPLETED | OUTPATIENT
Start: 2018-11-19 | End: 2018-11-19

## 2018-11-19 RX ADMIN — IRON SUCROSE 200 MG: 20 INJECTION, SOLUTION INTRAVENOUS at 06:03

## 2018-11-19 RX ADMIN — TRAMADOL HYDROCHLORIDE 50 MG: 50 TABLET, FILM COATED ORAL at 21:30

## 2018-11-19 RX ADMIN — ONDANSETRON 4 MG: 2 INJECTION INTRAMUSCULAR; INTRAVENOUS at 21:30

## 2018-11-19 RX ADMIN — ARIPIPRAZOLE 30 MG: 10 TABLET ORAL at 06:04

## 2018-11-19 RX ADMIN — ALBUTEROL SULFATE 2 PUFF: 90 AEROSOL, METERED RESPIRATORY (INHALATION) at 18:42

## 2018-11-19 RX ADMIN — BENZTROPINE MESYLATE 1 MG: 1 TABLET ORAL at 06:04

## 2018-11-19 RX ADMIN — BUDESONIDE AND FORMOTEROL FUMARATE DIHYDRATE 2 PUFF: 160; 4.5 AEROSOL RESPIRATORY (INHALATION) at 18:42

## 2018-11-19 RX ADMIN — NYSTATIN: 100000 POWDER TOPICAL at 18:42

## 2018-11-19 RX ADMIN — LITHIUM CARBONATE 600 MG: 300 TABLET ORAL at 21:30

## 2018-11-19 RX ADMIN — DOXEPIN HYDROCHLORIDE 25 MG: 25 CAPSULE ORAL at 21:30

## 2018-11-19 RX ADMIN — NYSTATIN: 100000 POWDER TOPICAL at 06:04

## 2018-11-19 RX ADMIN — ENOXAPARIN SODIUM 40 MG: 100 INJECTION SUBCUTANEOUS at 06:04

## 2018-11-19 RX ADMIN — LITHIUM CARBONATE 300 MG: 300 TABLET ORAL at 06:04

## 2018-11-19 RX ADMIN — BUDESONIDE AND FORMOTEROL FUMARATE DIHYDRATE 2 PUFF: 160; 4.5 AEROSOL RESPIRATORY (INHALATION) at 06:03

## 2018-11-19 RX ADMIN — STANDARDIZED SENNA CONCENTRATE AND DOCUSATE SODIUM 2 TABLET: 8.6; 5 TABLET, FILM COATED ORAL at 18:41

## 2018-11-19 RX ADMIN — ALBUTEROL SULFATE 2 PUFF: 90 AEROSOL, METERED RESPIRATORY (INHALATION) at 06:03

## 2018-11-19 ASSESSMENT — PAIN SCALES - GENERAL
PAINLEVEL_OUTOF10: 4
PAINLEVEL_OUTOF10: 8

## 2018-11-19 NOTE — DOCUMENTATION QUERY
DOCUMENTATION QUERY    PROVIDERS: Please select “Cosign w/ note”to reply to query.    To better represent the severity of illness of your patient, please review the following information and exercise your independent professional judgment in responding to this query.     Unspecified anemia is documented in the Progress Notes. Based upon the clinical findings, risk factors, and treatment, can this diagnosis be further specified?    • Iron deficiency anemia  • Acute blood loss anemia  • Chronic blood loss anemia  • Macrocytic anemia  • Microcytic anemia  • Normocytic anemia   • Other type of anemia (please specify type)  • Other explanation of clinical findings  • Unable to determine    The medical record reflects the following:   Clinical Findings Hg 12.2-11.3  MCHC 30.0-28.9  RDW 51.7-51.5  Iron 24  Total iron binding 554  % Saturation 4   Treatment Monitor labs & IV Venofer    Risk Factors Anemia, morbid obesity, cardiomegaly, COPD, KATHIA, & bipolar affective disorder   Location within medical record History & Physical, Progress Notes, & Lab Results      Thank you,   Lavern Hoffman RN  Clinical   Phone [phone]

## 2018-11-19 NOTE — PROGRESS NOTES
Internal Medicine Interval Note  Note Author: Yanick Espinal M.D.     Name Lorene Haro     1975   Age/Sex 43 y.o. female   MRN [id number]   Code Status Full Code     After 5PM or if no immediate response to page, please call for cross-coverage  Attending/Team: Donte/ Haim See Patient List for primary contact information  Call [phone] to page    1st Call - Day Intern (R1):   Kylie 2nd Call - Day Sr. Resident (R2/R3):   Eder         Reason for interval visit  (Principal Problem)   Acute hypoxemic respiratory failure (HCC)    HPI: 43 y.o. female, past medical history bipolar disorder, schizophrenia, COPD, morbid obesity, suspected undiagnosed sleep apnea presents for acute hypoxic respiratory failure admission ABG 7., sats 92% on 3 L.  Admitted to floor  and subsequently transferred to ICU , transferred to floor .  Respiratory status improved on BiPAP in ICU.  It was discovered that she is supposed to be using BiPAP, but forgot it in [place] when she moved to [place] 2-3 weeks ago.  Respiratory status has improved while using BiPAP for all naps and sleeping.    Interval Problem Daily Status Update  (24 hours)    Patient is up on O2 and ambulates with front wheel walker.  Physical therapy recommends skilled nursing for rehab.  Pending placement.    On review of systems, no complaints.  Pertinent negatives: Denies nausea, vomiting, diarrhea, constipation, syncope, palpitations, cough, shortness of breath, chest pain, changes in bowel or bladder habit, changes in appetite.  10 system review of systems otherwise negative.    Consultants/Specialty  Critical care  PCP: Roman Montes M.D.    Disposition  Inpatient for management of acute hypoxic respiratory failure requiring BiPAP.      PMHx:  Past Medical History:   Diagnosis Date   • Bipolar 2 disorder (HCC)    • Other psychological stress    • Schizophrenic disorder (HCC)        PSHx:  Past Surgical History:   Procedure  Laterality Date   • CHOLECYSTECTOMY     • COLECTOMY     • HYSTERECTOMY LAPAROSCOPY     • KNEE ARTHROSCOPY         Medications:  No current facility-administered medications on file prior to encounter.      No current outpatient prescriptions on file prior to encounter.       Allergies:  Allergies   Allergen Reactions   • Amoxicillin Rash       Quality Measures  Reviewed items:  EKG reviewed, Labs reviewed, Medications reviewed and Radiology images reviewed  Henao catheter:  No Henao  Central line: No central line  DVT prophylaxis:  Yes            Physical Exam       Vitals:    11/19/18 0500 11/19/18 0633 11/19/18 0900 11/19/18 1229   BP: 121/69  115/65 113/67   Pulse: 63 63 63 66   Resp: 20 16 20 18   Temp: 36.7 °C (98 °F)  36.9 °C (98.5 °F) 36.5 °C (97.7 °F)   TempSrc: Temporal  Temporal Temporal   SpO2: 97% 91% 94% 93%   Weight:       Height:         Body mass index is 53.95 kg/m². Weight: (!) 133.8 kg (294 lb 15.6 oz)  Oxygen Therapy:  Pulse Oximetry: 93 %, O2 (LPM): 2, O2 Delivery: Silicone Nasal Cannula    PHYSICAL EXAM: Stable since yesterday  Constitutional: OBESE, well-developed, well-nourished, and in no distress.   HENT:   Head: Normocephalic and atraumatic.   Eyes: Pupils are equal, round, and reactive to light. EOM are normal.   Neck: Normal range of motion. Neck supple. No JVD present. No tracheal deviation present. No thyromegaly present.   Cardiovascular: Regular rhythm.  Exam reveals no gallop and no friction rub.    No murmur heard.  Pulmonary/Chest: Effort normal and breath sounds normal. No stridor. No respiratory distress. No wheezes. No rales. No tenderness.  OXYGEN via nasal cannula, BIPAP for naps.  Abdominal: Soft. Bowel sounds are normal. No distension and no mass. There is no tenderness. There is no rebound and no guarding.   Musculoskeletal: Normal range of motion. No edema, tenderness or deformity.   Lymphadenopathy:     No cervical adenopathy.   Neurological: Alert and oriented to  person, place, and time. No cranial nerve deficit.   Skin: Skin is warm and dry. No rash noted. No erythema.   Psychiatric: Mood, memory, affect and judgment normal.   Vitals reviewed.      Lab Data Review:         11/16/2018  8:25 AM    Recent Labs      11/17/18   0235   SODIUM  139   POTASSIUM  4.5   CHLORIDE  101   CO2  36*   BUN  19   CREATININE  0.73   CALCIUM  9.4       Recent Labs      11/17/18   0235   GLUCOSE  83       Recent Labs      11/17/18   0235  11/17/18   1240   RBC  4.06*   --    HEMOGLOBIN  11.3*   --    HEMATOCRIT  39.1   --    PLATELETCT  197   --    IRON   --   24*   FERRITIN   --   17.6   TOTIRONBC   --   554*       Recent Labs      11/17/18   0235   WBC  7.9   NEUTSPOLYS  69.50   LYMPHOCYTES  21.50*   MONOCYTES  7.60   EOSINOPHILS  0.60   BASOPHILS  0.40           Assessment/Plan     COPD (chronic obstructive pulmonary disease) (HCC)- (present on admission)   Assessment & Plan    Plan to discharge on BiPAP 12/8, tidal volume 350, FiO2 0.35, 12/min.  Physical therapy recommends skilled nursing for rehabilitation.  We feel that her strength has since improved.  We walked with her on 2l NC, and found no imbalance or difficulty ambulating with the use of a walker.  We will discarge to home once BiPAP becomes available.   Maintain sats 88-92%.  Pulmonology consult: She will likely need a sleep study in the outpatient setting to optimize BiPAP settings.  She reports having had a sleep study recently, but is unable to produce documentation and only knows that it was performed in the [facility].  We appreciate social work efforts to attempt to track down documentation of the sleep study.  PLACEMENT: BiPAP will require preauthorization.  Patient will require BiPAP for discharge.  Patient will also need home O2 for persistent hypoxia and close outpatient follow up.       Cardiomegaly   Assessment & Plan    Seen on chest xray, no previous cardiac history per patient  BNP normal on admission, repeat  pending  Pain upon laying flat/chest tightness and SOB as well as possible pericardial effusion on chest xray, confirmatory echo negative.  TTE demonstrated EF 65% with normal wall motion, wall motion and valvular anatomy difficult to assess due to body habitus.     Anemia   Assessment & Plan    Chronic anemia.  Patient denies vaginal bleeding, blood in stool. FIT test pending.  Iron studies came back very low for iron.  IV iron per pharmacy.       KATHIA and COPD overlap syndrome (HCC)   Assessment & Plan    Apparent KATHIA....  Sleep study from [place], but no result in records.   Question of having had BIPAP before or not (but not for 2-3 weeks).   Pt's mother may stop by with one (depending on hx).   - Place on BIPAP, whenever napping.  -New home BiPAP order, as above.  -We will likely need a sleep study as an outpatient for BiPAP settings.     Hx of  Bipolar affective disorder, and schizophrenia   Assessment & Plan    Denying any depressive symptoms.    Does not appear manic.  Normal affect.  Continue home abilify and lithium

## 2018-11-19 NOTE — DISCHARGE PLANNING
Agency/Facility Name: [facility]  Spoke To: Fax Transmission  Outcome: Patient declined due to no open medicaid beds.

## 2018-11-19 NOTE — DISCHARGE PLANNING
Agency/Facility Name: [facility]  Spoke To: Fax Transmission   Outcome: Patient declined due to no open medicaid beds.

## 2018-11-20 PROBLEM — D50.9 IRON DEFICIENCY ANEMIA: Status: ACTIVE | Noted: 2018-11-18

## 2018-11-20 LAB — LITHIUM SERPL-MCNC: 0.6 MMOL/L (ref 0.6–1.2)

## 2018-11-20 PROCEDURE — 700111 HCHG RX REV CODE 636 W/ 250 OVERRIDE (IP): Performed by: STUDENT IN AN ORGANIZED HEALTH CARE EDUCATION/TRAINING PROGRAM

## 2018-11-20 PROCEDURE — 90732 PPSV23 VACC 2 YRS+ SUBQ/IM: CPT | Performed by: HOSPITALIST

## 2018-11-20 PROCEDURE — 3E0234Z INTRODUCTION OF SERUM, TOXOID AND VACCINE INTO MUSCLE, PERCUTANEOUS APPROACH: ICD-10-PCS | Performed by: HOSPITALIST

## 2018-11-20 PROCEDURE — A9270 NON-COVERED ITEM OR SERVICE: HCPCS | Performed by: INTERNAL MEDICINE

## 2018-11-20 PROCEDURE — 97535 SELF CARE MNGMENT TRAINING: CPT

## 2018-11-20 PROCEDURE — A9270 NON-COVERED ITEM OR SERVICE: HCPCS | Performed by: STUDENT IN AN ORGANIZED HEALTH CARE EDUCATION/TRAINING PROGRAM

## 2018-11-20 PROCEDURE — 97530 THERAPEUTIC ACTIVITIES: CPT

## 2018-11-20 PROCEDURE — 99232 SBSQ HOSP IP/OBS MODERATE 35: CPT | Mod: GC | Performed by: HOSPITALIST

## 2018-11-20 PROCEDURE — 90471 IMMUNIZATION ADMIN: CPT

## 2018-11-20 PROCEDURE — 5A09357 ASSISTANCE WITH RESPIRATORY VENTILATION, LESS THAN 24 CONSECUTIVE HOURS, CONTINUOUS POSITIVE AIRWAY PRESSURE: ICD-10-PCS | Performed by: HOSPITALIST

## 2018-11-20 PROCEDURE — 770020 HCHG ROOM/CARE - TELE (206)

## 2018-11-20 PROCEDURE — 700111 HCHG RX REV CODE 636 W/ 250 OVERRIDE (IP): Performed by: HOSPITALIST

## 2018-11-20 PROCEDURE — 700102 HCHG RX REV CODE 250 W/ 637 OVERRIDE(OP): Performed by: INTERNAL MEDICINE

## 2018-11-20 PROCEDURE — 700102 HCHG RX REV CODE 250 W/ 637 OVERRIDE(OP): Performed by: STUDENT IN AN ORGANIZED HEALTH CARE EDUCATION/TRAINING PROGRAM

## 2018-11-20 PROCEDURE — 97116 GAIT TRAINING THERAPY: CPT

## 2018-11-20 PROCEDURE — 94660 CPAP INITIATION&MGMT: CPT

## 2018-11-20 RX ADMIN — NYSTATIN: 100000 POWDER TOPICAL at 17:31

## 2018-11-20 RX ADMIN — ALBUTEROL SULFATE 2 PUFF: 90 AEROSOL, METERED RESPIRATORY (INHALATION) at 17:32

## 2018-11-20 RX ADMIN — BUDESONIDE AND FORMOTEROL FUMARATE DIHYDRATE 2 PUFF: 160; 4.5 AEROSOL RESPIRATORY (INHALATION) at 17:32

## 2018-11-20 RX ADMIN — ARIPIPRAZOLE 30 MG: 10 TABLET ORAL at 06:25

## 2018-11-20 RX ADMIN — ENOXAPARIN SODIUM 40 MG: 100 INJECTION SUBCUTANEOUS at 06:24

## 2018-11-20 RX ADMIN — BUDESONIDE AND FORMOTEROL FUMARATE DIHYDRATE 2 PUFF: 160; 4.5 AEROSOL RESPIRATORY (INHALATION) at 06:24

## 2018-11-20 RX ADMIN — LITHIUM CARBONATE 600 MG: 300 TABLET ORAL at 20:47

## 2018-11-20 RX ADMIN — STANDARDIZED SENNA CONCENTRATE AND DOCUSATE SODIUM 2 TABLET: 8.6; 5 TABLET, FILM COATED ORAL at 17:31

## 2018-11-20 RX ADMIN — BENZTROPINE MESYLATE 1 MG: 1 TABLET ORAL at 06:25

## 2018-11-20 RX ADMIN — PNEUMOCOCCAL VACCINE POLYVALENT 25 MCG
25; 25; 25; 25; 25; 25; 25; 25; 25; 25; 25; 25; 25; 25; 25; 25; 25; 25; 25; 25; 25; 25; 25 INJECTION, SOLUTION INTRAMUSCULAR; SUBCUTANEOUS at 06:25

## 2018-11-20 RX ADMIN — ALBUTEROL SULFATE 2 PUFF: 90 AEROSOL, METERED RESPIRATORY (INHALATION) at 06:24

## 2018-11-20 RX ADMIN — IRON SUCROSE 200 MG: 20 INJECTION, SOLUTION INTRAVENOUS at 06:24

## 2018-11-20 RX ADMIN — NYSTATIN: 100000 POWDER TOPICAL at 06:24

## 2018-11-20 RX ADMIN — DOXEPIN HYDROCHLORIDE 25 MG: 25 CAPSULE ORAL at 20:47

## 2018-11-20 RX ADMIN — LITHIUM CARBONATE 300 MG: 300 TABLET ORAL at 06:25

## 2018-11-20 ASSESSMENT — COGNITIVE AND FUNCTIONAL STATUS - GENERAL
DRESSING REGULAR UPPER BODY CLOTHING: A LITTLE
MOVING FROM LYING ON BACK TO SITTING ON SIDE OF FLAT BED: A LITTLE
TURNING FROM BACK TO SIDE WHILE IN FLAT BAD: A LITTLE
MOBILITY SCORE: 18
CLIMB 3 TO 5 STEPS WITH RAILING: A LITTLE
WALKING IN HOSPITAL ROOM: A LITTLE
HELP NEEDED FOR BATHING: A LITTLE
STANDING UP FROM CHAIR USING ARMS: A LITTLE
DAILY ACTIVITIY SCORE: 18
DRESSING REGULAR LOWER BODY CLOTHING: A LOT
TOILETING: A LITTLE
SUGGESTED CMS G CODE MODIFIER MOBILITY: CK
SUGGESTED CMS G CODE MODIFIER DAILY ACTIVITY: CK
PERSONAL GROOMING: A LITTLE
MOVING TO AND FROM BED TO CHAIR: A LITTLE

## 2018-11-20 ASSESSMENT — GAIT ASSESSMENTS
DISTANCE (FEET): 200
ASSISTIVE DEVICE: FRONT WHEEL WALKER
DEVIATION: INCREASED BASE OF SUPPORT;BRADYKINETIC
GAIT LEVEL OF ASSIST: STAND BY ASSIST

## 2018-11-20 ASSESSMENT — PAIN SCALES - GENERAL
PAINLEVEL_OUTOF10: 0

## 2018-11-20 NOTE — DISCHARGE PLANNING
LSW spoke with patient at bedside regarding d/c plan. Patient reported she lives with her sister, brother-in-law and mother who help out at home. Patient stated she would feel comfortable discharging home and using HHC. Patient stated that family can help with ADLs and she feels safe with family helping with transfers. LSW told patient would follow up with [facility] to see if HHC would be safe d/c plan.

## 2018-11-20 NOTE — PROGRESS NOTES
Received report from Randy, at pt bedside. Pt requires a CPAP at night that needs to be set up prior to D/C will discuss with SW today as sleep study will be completed after D/C out patient, pt also waiting for acceptance for SNF, POC discussed. Call light and belongings within reach. Bed locked and in low position. Alarm on and fall precautions in place.

## 2018-11-20 NOTE — TREATMENT PLAN
Regarding the patient's BiPAP for home order, please note that the indication for this order is chronic: COPD/KATHIA overlap syndrome.  The patient's acute respiratory failure is resolved, but she still requires a BiPAP for all naps and sleeping.

## 2018-11-20 NOTE — CARE PLAN
Problem: Communication  Goal: The ability to communicate needs accurately and effectively will improve  Outcome: PROGRESSING AS EXPECTED  Pt educated on POC. Pt waiting for CPAP set up prior to D/C and acceptance for SNF.     Problem: Safety  Goal: Will remain free from injury  Outcome: PROGRESSING AS EXPECTED  Pt educated to call for assistance prior to getting out of bed. Call light and belongings within reach. Non skid socks are on. Bed is locked in lowest position.     Problem: Respiratory:  Goal: Respiratory status will improve  Outcome: PROGRESSING AS EXPECTED  Pt requiring 0-3 L of oxygen thought the day with a CPAP at night.

## 2018-11-20 NOTE — THERAPY
"Occupational Therapy Treatment completed with focus on ADLs and ADL transfers.  Functional Status:  CGA UB dressing, SPV toileting, SPV standing grooming, SBA functional mobility w/ FWW  Plan of Care: Will benefit from Occupational Therapy 3 times per week  Discharge Recommendations:  Equipment Will Continue to Assess for Equipment Needs. Post-acute therapy Discharge to home with outpatient or home health for additional skilled therapy services.    See \"Rehab Therapy-Acute\" Patient Summary Report for complete documentation.      Pt making good progress towards acute OT goals. POC updated to reflect pt's current level of function. Pt continues to demo improved balance, functional mobility and activity tolerance. Recommend DC home w/ home health. Will continue to follow while in house to facilitate a safe transition home.     "

## 2018-11-20 NOTE — PROGRESS NOTES
Paging MD Espinal for possible shower orders, PT worked with pt and recommend walker at home with HH.Updated MD, received orders.

## 2018-11-20 NOTE — CARE PLAN
Problem: Oxygenation:  Goal: Maintain adequate oxygenation dependent on patient condition  PT wore CPAP/3L.min bleedin o2 unit all night, tolerated well.

## 2018-11-20 NOTE — DISCHARGE PLANNING
Agency/Facility Name: [facility]  Spoke To: Branden  Outcome: Patient declined due to no open medicaid beds.     Agency/Facility Name: [facility]  Spoke To: Espinoza  Outcome: Patient declined due to no open medicaid beds.     Agency/Facility Name: [facility]  Spoke To: Katelyn  Outcome: Patient declined due to facility not being medicaid certified.

## 2018-11-20 NOTE — PROGRESS NOTES
Internal Medicine Interval Note  Note Author: Yanick Espinal M.D.     Name Lorene Haro     1975   Age/Sex 43 y.o. female   MRN [id number]   Code Status Full Code     After 5PM or if no immediate response to page, please call for cross-coverage  Attending/Team: Donte/ Haim See Patient List for primary contact information  Call [phone] to page    1st Call - Day Intern (R1):   Kylie 2nd Call - Day Sr. Resident (R2/R3):   Eder         Reason for interval visit  (Principal Problem)   KATHIA and COPD overlap syndrome (HCC)    HPI: 43 y.o. female, past medical history bipolar disorder, schizophrenia, COPD, morbid obesity, suspected undiagnosed sleep apnea presents for acute hypoxic respiratory failure admission ABG 7., sats 92% on 3 L.  Admitted to floor  and subsequently transferred to ICU , transferred to floor .  Respiratory status improved on BiPAP in ICU.  It was discovered that she is supposed to be using BiPAP, but forgot it in [place] when she moved to [place] 2-3 weeks ago.  Respiratory status has improved while using BiPAP for all naps and sleeping.    Interval Problem Daily Status Update  (24 hours)    Patient much more energetic with nocturnal CPAP.  She is compliant with her CPAP at this time.  Per Dr. Whitmore, skilled nursing is indicated.    Patient is up on O2 and ambulates with front wheel walker.  Physical therapy recommends skilled nursing for rehab.  Pending placement.    On review of systems, no complaints.  Pertinent negatives: Denies nausea, vomiting, diarrhea, constipation, syncope, palpitations, cough, shortness of breath, chest pain, changes in bowel or bladder habit, changes in appetite.  10 system review of systems otherwise negative.    Consultants/Specialty  Critical care  PCP: Roman Montes M.D.    Disposition  Inpatient for management of acute hypoxic respiratory failure requiring BiPAP.      PMHx:  Past Medical History:   Diagnosis Date    • Bipolar 2 disorder (HCC)    • Other psychological stress    • Schizophrenic disorder (HCC)        PSHx:  Past Surgical History:   Procedure Laterality Date   • CHOLECYSTECTOMY     • COLECTOMY     • HYSTERECTOMY LAPAROSCOPY     • KNEE ARTHROSCOPY         Medications:  No current facility-administered medications on file prior to encounter.      No current outpatient prescriptions on file prior to encounter.       Allergies:  Allergies   Allergen Reactions   • Amoxicillin Rash       Quality Measures  Reviewed items:  EKG reviewed, Labs reviewed, Medications reviewed and Radiology images reviewed  Henao catheter:  No Henao  Central line: No central line  DVT prophylaxis:  Yes            Physical Exam       Vitals:    11/20/18 0445 11/20/18 0800 11/20/18 0834 11/20/18 1200   BP: 116/62 117/59 117/59 115/50   Pulse: 65 65 71 66   Resp: 18 18 18 18   Temp: 36.8 °C (98.2 °F) 36.6 °C (97.9 °F)  36.6 °C (97.9 °F)   TempSrc: Temporal Temporal  Temporal   SpO2: 99% 94% 95% 92%   Weight:       Height:         Body mass index is 53.79 kg/m². Weight: (!) 133.4 kg (294 lb 1.5 oz)  Oxygen Therapy:  Pulse Oximetry: 92 %, O2 (LPM): 2, O2 Delivery: Silicone Nasal Cannula    PHYSICAL EXAM: Stable since yesterday  Constitutional: OBESE, well-developed, well-nourished, and in no distress.   HENT:   Head: Normocephalic and atraumatic.   Eyes: Pupils are equal, round, and reactive to light. EOM are normal.   Neck: Normal range of motion. Neck supple. No JVD present. No tracheal deviation present. No thyromegaly present.   Cardiovascular: Regular rhythm.  Exam reveals no gallop and no friction rub.    No murmur heard.  Pulmonary/Chest: Effort normal and breath sounds normal. No stridor. No respiratory distress. No wheezes. No rales. No tenderness.  OXYGEN via nasal cannula, BIPAP for naps.  Abdominal: Soft. Bowel sounds are normal. No distension and no mass. There is no tenderness. There is no rebound and no guarding.   Musculoskeletal:  Normal range of motion. No edema, tenderness or deformity.   Lymphadenopathy:     No cervical adenopathy.   Neurological: Alert and oriented to person, place, and time. No cranial nerve deficit.   Skin: Skin is warm and dry. No rash noted. No erythema.   Psychiatric: Mood, memory, affect and judgment normal.   Vitals reviewed.      Lab Data Review:         11/16/2018  8:25 AM    No results for input(s): SODIUM, POTASSIUM, CHLORIDE, CO2, BUN, CREATININE, MAGNESIUM, PHOSPHORUS, CALCIUM in the last 72 hours.    No results for input(s): ALTSGPT, ASTSGOT, ALKPHOSPHAT, TBILIRUBIN, DBILIRUBIN, GAMMAGT, AMYLASE, LIPASE, ALB, PREALBUMIN, GLUCOSE in the last 72 hours.    No results for input(s): RBC, HEMOGLOBIN, HEMATOCRIT, PLATELETCT, PROTHROMBTM, APTT, INR, IRON, FERRITIN, TOTIRONBC in the last 72 hours.              Assessment/Plan     * KATHIA and COPD overlap syndrome (HCC)   Assessment & Plan    Apparent KATHIA....  [place] study from [place], but no result in records.   Question of having had BIPAP before or not (but not for 2-3 weeks).   Pt's mother may stop by with one (depending on hx).   - Place on BIPAP, whenever napping.  -New home BiPAP order, as above.  -We will likely need a sleep study as an outpatient for BiPAP settings.     COPD (chronic obstructive pulmonary disease) (HCC)- (present on admission)   Assessment & Plan    Plan to discharge on BiPAP 12/8, tidal volume 350, FiO2 0.35, 12/min.  Physical therapy recommends skilled nursing for rehabilitation.  We feel that her strength has since improved.  We walked with her on 2l NC, and found no imbalance or difficulty ambulating with the use of a walker.  We will discarge to home once BiPAP becomes available.   Maintain sats 88-92%.  Pulmonology consult: She will likely need a sleep study in the outpatient setting to optimize BiPAP settings.  She reports having had a sleep study recently, but is unable to produce documentation and only knows that it was performed in the  [facility]  We appreciate social work efforts to attempt to track down documentation of the sleep study.  PLACEMENT: BiPAP will require preauthorization.  Patient will require BiPAP for discharge.  Patient will also need home O2 for persistent hypoxia and close outpatient follow up. SNF is pending availability of a Medicaid bed.  No issues with arrhythmia, D/C Tele  PT reconsulted for inpatient therapy, pending bed at SNF.  If we cannot find her bed, hopefully we can get her rehabilitated in hospital to discharge home.  She does have a support system at home.       Cardiomegaly   Assessment & Plan    Seen on chest xray, no previous cardiac history per patient  BNP normal on admission, repeat pending  Pain upon laying flat/chest tightness and SOB as well as possible pericardial effusion on chest xray, confirmatory echo negative.  TTE demonstrated EF 65% with normal wall motion, wall motion and valvular anatomy difficult to assess due to body habitus.     Iron deficiency anemia   Assessment & Plan    Chronic anemia.  Patient denies vaginal bleeding, blood in stool. FIT test pending.  Iron studies came back very low for iron.  IV iron per pharmacy.       Hx of  Bipolar affective disorder, and schizophrenia   Assessment & Plan    Denying any depressive symptoms.    Does not appear manic.  Normal affect.  Continue home abilify and lithium

## 2018-11-20 NOTE — DISCHARGE PLANNING
Agency/Facility Name: [facility]    Spoke To: Halle  Outcome: Patient declined due to no open medicaid beds.

## 2018-11-20 NOTE — DISCHARGE PLANNING
Agency/Facility Name: [facility]  Spoke To: Fax Transmission  Outcome: No short term medicaid beds

## 2018-11-20 NOTE — PROGRESS NOTES
Pt C/O dizziness while sitting in chair. MD at bedside, orders to complete orthostatic BP.VS are stable.

## 2018-11-21 ENCOUNTER — HOME HEALTH ADMISSION (OUTPATIENT)
Dept: HOME HEALTH SERVICES | Facility: HOME HEALTHCARE | Age: 43
End: 2018-11-21
Payer: MEDICAID

## 2018-11-21 VITALS
WEIGHT: 293 LBS | DIASTOLIC BLOOD PRESSURE: 59 MMHG | HEIGHT: 62 IN | SYSTOLIC BLOOD PRESSURE: 132 MMHG | RESPIRATION RATE: 18 BRPM | HEART RATE: 67 BPM | BODY MASS INDEX: 53.92 KG/M2 | OXYGEN SATURATION: 93 % | TEMPERATURE: 98 F

## 2018-11-21 LAB
ANION GAP SERPL CALC-SCNC: 2 MMOL/L (ref 0–11.9)
BUN SERPL-MCNC: 13 MG/DL (ref 8–22)
CALCIUM SERPL-MCNC: 9.5 MG/DL (ref 8.5–10.5)
CHLORIDE SERPL-SCNC: 100 MMOL/L (ref 96–112)
CO2 SERPL-SCNC: 35 MMOL/L (ref 20–33)
CREAT SERPL-MCNC: 0.76 MG/DL (ref 0.5–1.4)
ERYTHROCYTE [DISTWIDTH] IN BLOOD BY AUTOMATED COUNT: 48.4 FL (ref 35.9–50)
GLUCOSE SERPL-MCNC: 91 MG/DL (ref 65–99)
HCT VFR BLD AUTO: 40.6 % (ref 37–47)
HGB BLD-MCNC: 12.2 G/DL (ref 12–16)
MCH RBC QN AUTO: 28 PG (ref 27–33)
MCHC RBC AUTO-ENTMCNC: 30 G/DL (ref 33.6–35)
MCV RBC AUTO: 93.1 FL (ref 81.4–97.8)
PLATELET # BLD AUTO: 195 K/UL (ref 164–446)
PMV BLD AUTO: 11.1 FL (ref 9–12.9)
POTASSIUM SERPL-SCNC: 4.5 MMOL/L (ref 3.6–5.5)
RBC # BLD AUTO: 4.36 M/UL (ref 4.2–5.4)
SODIUM SERPL-SCNC: 137 MMOL/L (ref 135–145)
WBC # BLD AUTO: 9.1 K/UL (ref 4.8–10.8)

## 2018-11-21 PROCEDURE — 99239 HOSP IP/OBS DSCHRG MGMT >30: CPT | Mod: GC | Performed by: HOSPITALIST

## 2018-11-21 PROCEDURE — A9270 NON-COVERED ITEM OR SERVICE: HCPCS | Performed by: STUDENT IN AN ORGANIZED HEALTH CARE EDUCATION/TRAINING PROGRAM

## 2018-11-21 PROCEDURE — 80048 BASIC METABOLIC PNL TOTAL CA: CPT

## 2018-11-21 PROCEDURE — 36415 COLL VENOUS BLD VENIPUNCTURE: CPT

## 2018-11-21 PROCEDURE — 700111 HCHG RX REV CODE 636 W/ 250 OVERRIDE (IP): Performed by: STUDENT IN AN ORGANIZED HEALTH CARE EDUCATION/TRAINING PROGRAM

## 2018-11-21 PROCEDURE — 700102 HCHG RX REV CODE 250 W/ 637 OVERRIDE(OP): Performed by: STUDENT IN AN ORGANIZED HEALTH CARE EDUCATION/TRAINING PROGRAM

## 2018-11-21 PROCEDURE — 85027 COMPLETE CBC AUTOMATED: CPT

## 2018-11-21 RX ADMIN — ACETAMINOPHEN 650 MG: 325 TABLET, FILM COATED ORAL at 17:18

## 2018-11-21 RX ADMIN — ALBUTEROL SULFATE 2 PUFF: 90 AEROSOL, METERED RESPIRATORY (INHALATION) at 17:04

## 2018-11-21 RX ADMIN — BENZTROPINE MESYLATE 1 MG: 1 TABLET ORAL at 05:36

## 2018-11-21 RX ADMIN — NYSTATIN: 100000 POWDER TOPICAL at 05:35

## 2018-11-21 RX ADMIN — BUDESONIDE AND FORMOTEROL FUMARATE DIHYDRATE 2 PUFF: 160; 4.5 AEROSOL RESPIRATORY (INHALATION) at 09:33

## 2018-11-21 RX ADMIN — LITHIUM CARBONATE 300 MG: 300 TABLET ORAL at 05:35

## 2018-11-21 RX ADMIN — BUDESONIDE AND FORMOTEROL FUMARATE DIHYDRATE 2 PUFF: 160; 4.5 AEROSOL RESPIRATORY (INHALATION) at 17:03

## 2018-11-21 RX ADMIN — ARIPIPRAZOLE 30 MG: 10 TABLET ORAL at 05:35

## 2018-11-21 RX ADMIN — IRON SUCROSE 200 MG: 20 INJECTION, SOLUTION INTRAVENOUS at 05:35

## 2018-11-21 RX ADMIN — ENOXAPARIN SODIUM 40 MG: 100 INJECTION SUBCUTANEOUS at 05:36

## 2018-11-21 RX ADMIN — ALBUTEROL SULFATE 2 PUFF: 90 AEROSOL, METERED RESPIRATORY (INHALATION) at 05:35

## 2018-11-21 ASSESSMENT — PAIN SCALES - GENERAL
PAINLEVEL_OUTOF10: 0
PAINLEVEL_OUTOF10: 2

## 2018-11-21 NOTE — DISCHARGE PLANNING
[facility] has received your referral. At this time we cannot accept the referral as is. There is no PCP listed for the patient and one has not been provided to . Please provide  with both a PCP and appointment so we may verify. Patient will not be seen until they establish. Thank you

## 2018-11-21 NOTE — DISCHARGE PLANNING
Anticipated Discharge Disposition: Home    Action: Called [facility] to inquire about status from O2 and Bipap. Spoke with Luisa Larson who requested updated O2 order, as the one from the 18th is only for nocturnal. LSW faxed order that was placed 11/20. Malika also stated Medicaid declined Bipap for unclear reasons. She stated she would follow up with Medicaid and call this SW back.     LSW met with pt at bedside to update. Discussed recommendations for HH. Pt agreeable and made choice for [facility]. She states she's seen at [facility].    Barriers to Discharge: HH acceptance; Auth for Bipap    Plan: F/U with [facility] and [facility]. Per MD, pt can't d/c without Bipap

## 2018-11-21 NOTE — DISCHARGE PLANNING
ATTN: Case Management  RE: Referral for Home Health                We would like to take this opportunity to thank you for submitting a referral for your patient to continue care with [facility]. Our skilled team is dedicated to helping all patients recover and gain independence in the home setting.            As of 11/21/18, we have accepted the above patient into our service. A [facility] clinician will be out to see the patient within 48 hours to conduct our initial visit. If you have any questions or concerns regarding the patient’s transition to Home Health, please do not hesitate to contact us. We are open for referrals 7 days a week from 8AM to 5PM at [phone].      We look forward to collaborating with you,  [facility] Team

## 2018-11-21 NOTE — DISCHARGE PLANNING
Anticipated Discharge Disposition: Home with HH    Action: Spoke with Malika with [facility]. She states O2 is being delivered and that they will deliver Bipap once pt arrives home. She states insurance declined the bipap, however it was due to the dates provided on the order. She stated length needs to be longer than 30 days.   Called [facility] resident who confirmed pt needs this long term. He will update order. Malika states they will deliver Bipap once order is received.   Updated family at bedside. Mother states pt's sister is home and they will confirm Bipap is delivered prior to taking pt home.     Barriers to Discharge: Delivery of DME    Plan: Send new Bipap DME order and F/U with [facility]

## 2018-11-21 NOTE — DISCHARGE PLANNING
Received Choice form at 1418  Agency/Facility Name: [facility]  Referral sent per Choice form at 1417

## 2018-11-21 NOTE — PROGRESS NOTES
Per SW, insurance initially denied BiPAP.  Given her chronically tenuous respiratory status, she will be unable to discharge without BiPAP.

## 2018-11-21 NOTE — DISCHARGE SUMMARY
Discharge Summary    CHIEF COMPLAINT ON ADMISSION  Chief Complaint   Patient presents with   • Shortness of Breath     increasing sob x 3 wks    • Sent by MD     for low pulse ox saturation 79% ra        Reason for Admission  Sent by MD     Admission Date  11/13/2018    CODE STATUS  Full Code    HPI & HOSPITAL COURSE   43 y.o. female, past medical history bipolar disorder, schizophrenia, COPD, morbid obesity, suspected undiagnosed sleep apnea presents for acute hypoxic respiratory failure admission ABG 7.23/77//32, sats 92% on 3 L.  Admitted to floor 11/13 and subsequently transferred to ICU 11/14, transferred to floor 11/16.  Respiratory status improved on BiPAP in ICU.  It was discovered that she is supposed to be using BiPAP, but forgot it in [place] when she moved to [place] 2-3 weeks ago.  Respiratory status has improved while using BiPAP for all naps and sleeping.    Patient will need outpatient sleep study and close follow-up with primary care provider.  Please see Pulmonology consult notes for settings.  Patient did have a sleep study performed in [place], but we are unable to locate the records as she is unable to further identify the location or physician.  The patient can discharge pending home health approval, but will require the physical presence of a BiPAP before she leaves the hospital, given her tenuous respiratory/ventilation status.  She quickly enters mild respiratory distress even when napping without BiPAP.       Therefore, she is discharged in fair and stable condition to home with close outpatient follow-up.    The patient met 2-midnight criteria for an inpatient stay at the time of discharge.    Discharge Date  11/21/2018, pending availability of BiPAP.    FOLLOW UP ITEMS POST DISCHARGE  Outpatient sleep study  Follow-up with primary care provider    DISCHARGE DIAGNOSES  Principal Problem:    KATHIA and COPD overlap syndrome (HCC) POA: Unknown  Active Problems:    COPD (chronic obstructive pulmonary  disease) ([facility]) POA: Yes    Cardiomegaly POA: Unknown    Hx of  Bipolar affective disorder, and schizophrenia POA: Unknown    Class 3 severe obesity due to excess calories with body mass index (BMI) of 50.0 to 59.9 in adult ([facility]) POA: Unknown    Iron deficiency anemia POA: Unknown  Resolved Problems:    Acute hypoxemic respiratory failure ([facility]) POA: Yes      FOLLOW UP  Future Appointments  Date Time Provider Department Center   11/30/2018 2:20 PM Isa Meraz M.D. [facility] None   12/4/2018 1:00 PM CRISTOBAL Mccarthy [facility] None     Your Primary Care Provider   [address]  Schedule an appointment as soon as possible for a visit in 1 week        MEDICATIONS ON DISCHARGE     Medication List      START taking these medications      Instructions   predniSONE 20 MG Tabs  Commonly known as:  DELTASONE   Take 1.5 Tabs by mouth every day for 7 days.  Dose:  30 mg        CONTINUE taking these medications      Instructions   ABILIFY 30 MG tablet  Generic drug:  aripiprazole   Take 30 mg by mouth every morning.  Dose:  30 mg     albuterol 108 (90 Base) MCG/ACT Aers inhalation aerosol   Inhale 2 Puffs by mouth every 6 hours as needed for Shortness of Breath.  Dose:  2 Puff     benztropine 1 MG Tabs  Commonly known as:  COGENTIN   Take 1 mg by mouth every morning.  Dose:  1 mg     BREO ELLIPTA 100-25 MCG/INH Aepb  Generic drug:  Fluticasone Furoate-Vilanterol   Inhale 1 Puff by mouth every day.  Dose:  1 Puff     doxepin 25 MG Caps  Commonly known as:  SINEQUAN   Take 25 mg by mouth every bedtime.  Dose:  25 mg     doxycycline 100 MG Tabs  Commonly known as:  VIBRAMYCIN   Take 100 mg by mouth every day.  Dose:  100 mg     ibuprofen 800 MG Tabs  Commonly known as:  MOTRIN   Take 800 mg by mouth 1 time daily as needed (for pain).  Dose:  800 mg     lithium 300 MG Tabs  Commonly known as:  ESKALITH   Take 300-600 mg by mouth 2 Times a Day. 300 mg in the morning and 600 mg at bedtime  Dose:  300-600 mg             Allergies  Allergies   Allergen Reactions   • Amoxicillin Rash       DIET  Orders Placed This Encounter   Procedures   • Diet Order Regular     Standing Status:   Standing     Number of Occurrences:   1     Order Specific Question:   Diet:     Answer:   Regular [1]       ACTIVITY  As tolerated.  Weight bearing as tolerated    CONSULTATIONS  Pulmonology    PROCEDURES  No invasive procedures.    LABORATORY  Lab Results   Component Value Date    SODIUM 137 11/21/2018    POTASSIUM 4.5 11/21/2018    CHLORIDE 100 11/21/2018    CO2 35 (H) 11/21/2018    GLUCOSE 91 11/21/2018    BUN 13 11/21/2018    CREATININE 0.76 11/21/2018    CREATININE 0.9 09/11/2008        Lab Results   Component Value Date    WBC 9.1 11/21/2018    HEMOGLOBIN 12.2 11/21/2018    HEMATOCRIT 40.6 11/21/2018    PLATELETCT 195 11/21/2018        Total time of the discharge process is 90 minutes.

## 2018-11-21 NOTE — DISCHARGE PLANNING
Called [facility] and spoke with medical assistant who confirmed pt's PCP is Dr. Almaraz's. Last visit was 11/13. Notified [facility].

## 2018-11-21 NOTE — FACE TO FACE
Face to Face Supporting Documentation - Home Health    The encounter with this patient was in whole or in part the primary reason for home health admission.    Date of encounter:   Patient:                    MRN:                       YOB: 2020  Lorene Haro  [id number]  1975     Home health to see patient for:  Skilled Nursing care for assessment, interventions & education, Physical Therapy evaluation and treatment and Occupational therapy evaluation and treatment    Skilled need for:  Recent Deterioration of Health Status worsening BiPAP/ KATHIA overlay syndrome    Skilled nursing interventions to include:  Wound Care and Comment: BiPAP and O2 setup/maintenance/ respiratory monitoring    Homebound status evidenced by:  Need the aid of supportive devices such as crutches, canes, wheelchairs or walkers. Leaving home requires a considerable and taxing effort. There is a normal inability to leave the home.    Community Physician to provide follow up care: Pcp Not In Computer     Optional Interventions? No      I certify the face to face encounter for this home health care referral meets the CMS requirements and the encounter/clinical assessment with the patient was, in whole, or in part, for the medical condition(s) listed above, which is the primary reason for home health care. Based on my clinical findings: the service(s) are medically necessary, support the need for home health care, and the homebound criteria are met.  I certify that this patient has had a face to face encounter by myself.  Yanick Espinal M.D. - RADHAI: [phone]

## 2018-11-21 NOTE — PROGRESS NOTES
Took bedside report from JC Tee. Assumed patient care. VS stable and within defined parameters. Patient resting comfortably in bed. All questions and concerns answered at this time. Bed locked and in lowest position. Call light within reach. Will continue to monitor patient.

## 2018-11-21 NOTE — THERAPY
"Physical Therapy Treatment completed.   Bed Mobility:  Supine to Sit:  (NT in chair pre/post)  Transfers: Sit to Stand: Stand by Assist  Gait: Level Of Assist: Stand by Assist with Front-Wheel Walker       Plan of Care: Will benefit from Physical Therapy 3 times per week  Discharge Recommendations: Equipment: Front-Wheel Walker.    See \"Rehab Therapy-Acute\" Patient Summary Report for complete documentation.     Pt presenting increased functional mobility from previous tx. Pt was able to perform all mobility at a SBA-CGA level. Pt was able to walk 200 feet and complete 8 stairs. Pt had also just completed ambulation and grooming tasks w/ OT, demonstrating improved endurance. Pt was educated on increasing OOB activity as well as using the IS to decrease O2 needs w/ pt receptive. Pt is currently at a functional level for DC home w/ HH. Pt will have 24/7 family support upon DC.  "

## 2018-11-22 NOTE — DISCHARGE PLANNING
Anticipated Discharge Disposition: Home with HH    Action: New DME order sent to preferred. Spoke with Malika who's aware and states Bipap should be delivered to pt's home. Family is verifying it will be delivered prior to taking pt home.     Barriers to Discharge: None    Plan: D/C home when equipment is delivered

## 2018-11-22 NOTE — PROGRESS NOTES
PT discharged home with home health,  IV removed, tele monitor removed, all DC instructed reviewed including what to do if she has trouble with her bipap after DC, and where to get her medications.

## 2018-11-22 NOTE — DISCHARGE INSTRUCTIONS
Discharge Instructions    Discharged to home by car with relative. Discharged via wheelchair, hospital escort: Yes.  Special equipment needed: Oxygen    Be sure to schedule a follow-up appointment with your primary care doctor or any specialists as instructed.     Discharge Plan:   Diet Plan: Discussed  Activity Level: Discussed  Smoking Cessation Offered: Patient Refused  Confirmed Follow up Appointment: Appointment Scheduled  Confirmed Symptoms Management: Discussed  Medication Reconciliation Updated: Yes  Pneumococcal Vaccine Administered/Refused: Given (See MAR)  Influenza Vaccine Indication: Indicated: 9 to 64 years of age  Influenza Vaccine Given - only chart on this line when given: Influenza Vaccine Given (See MAR)    I understand that a diet low in cholesterol, fat, and sodium is recommended for good health. Unless I have been given specific instructions below for another diet, I accept this instruction as my diet prescription.       Special Instructions: None    · Is patient discharged on Warfarin / Coumadin?   No     Depression / Suicide Risk    As you are discharged from this [facility] facility, it is important to learn how to keep safe from harming yourself.    Recognize the warning signs:  · Abrupt changes in personality, positive or negative- including increase in energy   · Giving away possessions  · Change in eating patterns- significant weight changes-  positive or negative  · Change in sleeping patterns- unable to sleep or sleeping all the time   · Unwillingness or inability to communicate  · Depression  · Unusual sadness, discouragement and loneliness  · Talk of wanting to die  · Neglect of personal appearance   · Rebelliousness- reckless behavior  · Withdrawal from people/activities they love  · Confusion- inability to concentrate     If you or a loved one observes any of these behaviors or has concerns about self-harm, here's what you can do:  · Talk about it- your feelings and reasons for  harming yourself  · Remove any means that you might use to hurt yourself (examples: pills, rope, extension cords, firearm)  · Get professional help from the community (Mental Health, Substance Abuse, psychological counseling)  · Do not be alone:Call your Safe Contact- someone whom you trust who will be there for you.  · Call your local CRISIS HOTLINE 457-2503 or 090-908-1359  · Call your local Children's Mobile Crisis Response Team [place] (099) 908-6903 or wwwPasslogix  · Call the toll free National Suicide Prevention Hotlines   · National Suicide Prevention Lifeline 392-654-XHNM (8602)  · [employer] Network 800-SUICIDE (772-9657)            Sleep Apnea  Sleep apnea is a condition that affects breathing. People with sleep apnea have moments during sleep when their breathing pauses briefly or gets shallow. Sleep apnea can cause these symptoms:  · Trouble staying asleep.  · Sleepiness or tiredness during the day.  · Irritability.  · Loud snoring.  · Morning headaches.  · Trouble concentrating.  · Forgetting things.  · Less interest in sex.  · Being sleepy for no reason.  · Mood swings.  · Personality changes.  · Depression.  · Waking up a lot during the night to pee (urinate).  · Dry mouth.  · Sore throat.  Follow these instructions at home:  · Make any changes in your routine that your doctor recommends.  · Eat a healthy, well-balanced diet.  · Take over-the-counter and prescription medicines only as told by your doctor.  · Avoid using alcohol, calming medicines (sedatives), and narcotic medicines.  · Take steps to lose weight if you are overweight.  · If you were given a machine (device) to use while you sleep, use it only as told by your doctor.  · Do not use any tobacco products, such as cigarettes, chewing tobacco, and e-cigarettes. If you need help quitting, ask your doctor.  · Keep all follow-up visits as told by your doctor. This is important.  Contact a doctor if:  · The machine that  you were given to use during sleep is uncomfortable or does not seem to be working.  · Your symptoms do not get better.  · Your symptoms get worse.  Get help right away if:  · Your chest hurts.  · You have trouble breathing in enough air (shortness of breath).  · You have an uncomfortable feeling in your back, arms, or stomach.  · You have trouble talking.  · One side of your body feels weak.  · A part of your face is hanging down (drooping).  These symptoms may be an emergency. Do not wait to see if the symptoms will go away. Get medical help right away. Call your local emergency services (911 in the [place] Do not drive yourself to the hospital.   This information is not intended to replace advice given to you by your health care provider. Make sure you discuss any questions you have with your health care provider.  Document Released: 09/26/2009 Document Revised: 08/13/2017 Document Reviewed: 09/26/2016  [employer] Interactive Patient Education © 2017 [employer]        Chronic Obstructive Pulmonary Disease  Chronic obstructive pulmonary disease (COPD) is a common lung condition in which airflow from the lungs is limited. COPD is a general term that can be used to describe many different lung problems that limit airflow, including both chronic bronchitis and emphysema. If you have COPD, your lung function will probably never return to normal, but there are measures you can take to improve lung function and make yourself feel better.  What are the causes?  · Smoking (common).  · Exposure to secondhand smoke.  · Genetic problems.  · Chronic inflammatory lung diseases or recurrent infections.  What are the signs or symptoms?  · Shortness of breath, especially with physical activity.  · Deep, persistent (chronic) cough with a large amount of thick mucus.  · Wheezing.  · Rapid breaths (tachypnea).  · Gray or bluish discoloration (cyanosis) of the skin, especially in your fingers, toes, or lips.  · Fatigue.  · Weight  loss.  · Frequent infections or episodes when breathing symptoms become much worse (exacerbations).  · Chest tightness.  How is this diagnosed?  Your health care provider will take a medical history and perform a physical examination to diagnose COPD. Additional tests for COPD may include:  · Lung (pulmonary) function tests.  · Chest X-ray.  · CT scan.  · Blood tests.  How is this treated?  Treatment for COPD may include:  · Inhaler and nebulizer medicines. These help manage the symptoms of COPD and make your breathing more comfortable.  · Supplemental oxygen. Supplemental oxygen is only helpful if you have a low oxygen level in your blood.  · Exercise and physical activity. These are beneficial for nearly all people with COPD.  · Lung surgery or transplant.  · Nutrition therapy to gain weight, if you are underweight.  · Pulmonary rehabilitation. This may involve working with a team of health care providers and specialists, such as respiratory, occupational, and physical therapists.  Follow these instructions at home:  · Take all medicines (inhaled or pills) as directed by your health care provider.  · Avoid over-the-counter medicines or cough syrups that dry up your airway (such as antihistamines) and slow down the elimination of secretions unless instructed otherwise by your health care provider.  · If you are a smoker, the most important thing that you can do is stop smoking. Continuing to smoke will cause further lung damage and breathing trouble. Ask your health care provider for help with quitting smoking. He or she can direct you to community resources or hospitals that provide support.  · Avoid exposure to irritants such as smoke, chemicals, and fumes that aggravate your breathing.  · Use oxygen therapy and pulmonary rehabilitation if directed by your health care provider. If you require home oxygen therapy, ask your health care provider whether you should purchase a pulse oximeter to measure your oxygen  level at home.  · Avoid contact with individuals who have a contagious illness.  · Avoid extreme temperature and humidity changes.  · Eat healthy foods. Eating smaller, more frequent meals and resting before meals may help you maintain your strength.  · Stay active, but balance activity with periods of rest. Exercise and physical activity will help you maintain your ability to do things you want to do.  · Preventing infection and hospitalization is very important when you have COPD. Make sure to receive all the vaccines your health care provider recommends, especially the pneumococcal and influenza vaccines. Ask your health care provider whether you need a pneumonia vaccine.  · Learn and use relaxation techniques to manage stress.  · Learn and use controlled breathing techniques as directed by your health care provider. Controlled breathing techniques include:  1. Pursed lip breathing. Start by breathing in (inhaling) through your nose for 1 second. Then, purse your lips as if you were going to whistle and breathe out (exhale) through the pursed lips for 2 seconds.  2. Diaphragmatic breathing. Start by putting one hand on your abdomen just above your waist. Inhale slowly through your nose. The hand on your abdomen should move out. Then purse your lips and exhale slowly. You should be able to feel the hand on your abdomen moving in as you exhale.  · Learn and use controlled coughing to clear mucus from your lungs. Controlled coughing is a series of short, progressive coughs. The steps of controlled coughing are:  1. Lean your head slightly forward.  2. Breathe in deeply using diaphragmatic breathing.  3. Try to hold your breath for 3 seconds.  4. Keep your mouth slightly open while coughing twice.  5. Spit any mucus out into a tissue.  6. Rest and repeat the steps once or twice as needed.  Contact a health care provider if:  · You are coughing up more mucus than usual.  · There is a change in the color or thickness  "of your mucus.  · Your breathing is more labored than usual.  · Your breathing is faster than usual.  Get help right away if:  · You have shortness of breath while you are resting.  · You have shortness of breath that prevents you from:  ¨ Being able to talk.  ¨ Performing your usual physical activities.  · You have chest pain lasting longer than 5 minutes.  · Your skin color is more cyanotic than usual.  · You measure low oxygen saturations for longer than 5 minutes with a pulse oximeter.  This information is not intended to replace advice given to you by your health care provider. Make sure you discuss any questions you have with your health care provider.  Document Released: 09/27/2006 Document Revised: 05/25/2017 Document Reviewed: 08/14/2014  [employer] Interactive Patient Education © 2017 [employer]          CPAP and BIPAP Information  CPAP and BIPAP are methods of helping you breathe with the use of air pressure. CPAP stands for \"continuous positive airway pressure.\" BIPAP stands for \"bi-level positive airway pressure.\" In both methods, air is blown into your air passages to help keep you breathing well. With CPAP, the amount of pressure stays the same while you breathe in and out. CPAP is most commonly used for obstructive sleep apnea. For obstructive sleep apnea, CPAP works by holding your airways open so that they do not collapse when your muscles relax during sleep. BIPAP is similar to CPAP except the amount of pressure is increased when you inhale. This helps you take larger breaths. Your health care provider will recommend whether CPAP or BIPAP would be more helpful for you.   WHY ARE CPAP AND BIPAP TREATMENTS USED?  CPAP or BIPAP can be helpful if you have:   · Sleep apnea.    · Chronic obstructive pulmonary disease (COPD).    · Diseases that weaken the muscles of the chest, including muscular dystrophy or neurological diseases such as amyotrophic lateral sclerosis (ALS).  · Other problems that cause " breathing to be weak, abnormal, or difficult.    HOW IS CPAP OR BIPAP ADMINISTERED?  Both CPAP and BIPAP are provided by a small machine with a flexible plastic tube that attaches to a plastic mask. The mask fits on your face, and air is blown into your air passages through your nose or mouth. The amount of pressure that is used to blow the air into your air passages can be set on the machine. Your health care provider will determine the pressure setting that should be used based on your individual needs.  WHEN SHOULD CPAP OR BIPAP BE USED?  In most cases, the mask is worn only when sleeping. Generally, you will need to wear the mask throughout the night and during the daytime if you take a nap. In a few cases involving certain medical conditions, people also need to wear the mask at other times when they are awake. Follow your health care provider's instructions for when to use the machine.   USING THE MASK  · Because the mask needs to be snug, some people feel a trapped or closed-in feeling (claustrophobic) when first using the mask. You may need to get used to the mask gradually. To do this, you can first hold the mask loosely over your nose or mouth. Gradually apply the mask more snugly. You can also gradually increase the amount of time that you use the mask.  · Masks are available in various types and sizes. Some fit over your mouth and nose, and some fit over just your nose. If your mask does not fit well, talk to your health care provider about getting a different one.  · If you are using a nasal mask and you tend to breathe through your mouth, a chin strap may be applied to help keep your mouth closed.    · The CPAP and BIPAP machines have alarms that may sound if the mask comes off or develops a leak.  · If you have trouble with the mask, it is very important that you talk to your health care provider about finding a way to make the mask easier to tolerate. Do not stop using the mask. This could have a  negative impact on your health.  TIPS FOR USING THE MACHINE  · Place your CPAP or BIPAP machine on a secure table or stand near an electrical outlet.    · Know where the on-off switch is located on the machine.  · Follow your health care provider's instructions for how to set the pressure on your machine and when you should use it.  · Do not eat or drink while the CPAP or BIPAP machine is on. Food or fluids could get pushed into your lungs by the pressure of the CPAP or BIPAP.  · Do not smoke. Tobacco smoke residue can damage the machine.    · For home use, CPAP and BIPAP machines can be rented or purchased through home health care companies. Many different brands of machines are available. Renting a machine before purchasing may help you find out which particular machine works well for you.  SEEK IMMEDIATE MEDICAL CARE IF:  · You have redness or open areas around your nose or mouth where the mask fits.    · You have trouble operating the CPAP or BIPAP machine.    · You cannot tolerate wearing the CPAP or BIPAP mask.    This information is not intended to replace advice given to you by your health care provider. Make sure you discuss any questions you have with your health care provider.  Document Released: 09/15/2005 Document Revised: 01/08/2016 Document Reviewed: 07/17/2014  [employer] Interactive Patient Education © 2017 [employer]

## 2018-11-22 NOTE — PROGRESS NOTES
Spoke with Alessandro at [facility].  He assured me that the bipap would be for sure delivered tonight in 1-2 hours. It is on the truck and the  only has one stop before hers. Will discharge pt to home with plan to go straight there and get the bipap. [facility] states that they cannot take the bipap home with the pt there because they need to teach her to use it and fit her for a mask.

## 2018-11-24 ENCOUNTER — HOME CARE VISIT (OUTPATIENT)
Dept: HOME HEALTH SERVICES | Facility: HOME HEALTHCARE | Age: 43
End: 2018-11-24
Payer: MEDICAID

## 2018-11-24 VITALS
BODY MASS INDEX: 53.92 KG/M2 | TEMPERATURE: 98.1 F | RESPIRATION RATE: 18 BRPM | SYSTOLIC BLOOD PRESSURE: 122 MMHG | WEIGHT: 293 LBS | HEART RATE: 76 BPM | OXYGEN SATURATION: 97 % | HEIGHT: 62 IN | DIASTOLIC BLOOD PRESSURE: 72 MMHG

## 2018-11-24 PROCEDURE — 665001 SOC-HOME HEALTH

## 2018-11-24 PROCEDURE — G0493 RN CARE EA 15 MIN HH/HOSPICE: HCPCS

## 2018-11-24 SDOH — ECONOMIC STABILITY: HOUSING INSECURITY
HOME SAFETY: ENT IN THE HOME., INSTRUCTED PATIENT/CAREGIVER TO GET ONE AS SOON AS POSSIBLE. SMOKE ALARMS ARE PRESENT AND FUNCTIONAL ON EACH LEVEL OF THE HOME. PATIENT DOES HAVE A FIRE ESCAPE PLAN DEVELOPED. PATIENT DOES NOT HAVE FLAMMABLE MATERIALS PRESENT IN THE

## 2018-11-24 SDOH — ECONOMIC STABILITY: HOUSING INSECURITY: UNSAFE COOKING RANGE AREA: 0

## 2018-11-24 SDOH — ECONOMIC STABILITY: HOUSING INSECURITY: UNSAFE APPLIANCES: 0

## 2018-11-24 SDOH — ECONOMIC STABILITY: HOUSING INSECURITY: HOME SAFETY: HOME PRESENTING A FIRE HAZARD. NO EVIDENCE FOUND OF SMOKING MATERIALS PRESENT IN THE HOME.

## 2018-11-24 SDOH — ECONOMIC STABILITY: HOUSING INSECURITY
HOME SAFETY: OXYGEN SAFETY RISK ASSESSMENT PERFORMED. PATIENT DOES NOT HAVE A NO SMOKING SIGN POSTED IN THE HOME., PT WAS GIVEN A NO SMOKING SIGN AND PROVIDED EDUCATION ABOUT WHY IT IS IMPORTANT TO PLACE ONE. PATIENT DOES NOT HAVE A WORKING FIRE EXTINGUISHER PRES

## 2018-11-24 ASSESSMENT — PATIENT HEALTH QUESTIONNAIRE - PHQ9
5. POOR APPETITE OR OVEREATING: 1 - SEVERAL DAYS
SUM OF ALL RESPONSES TO PHQ QUESTIONS 1-9: 7
2. FEELING DOWN, DEPRESSED, IRRITABLE, OR HOPELESS: 01
1. LITTLE INTEREST OR PLEASURE IN DOING THINGS: 01
CLINICAL INTERPRETATION OF PHQ2 SCORE: 2

## 2018-11-24 ASSESSMENT — ENCOUNTER SYMPTOMS
VOMITING: THIS MORNING
NAUSEA: OCCASIONAL
DEPRESSED MOOD: 1

## 2018-11-24 ASSESSMENT — ACTIVITIES OF DAILY LIVING (ADL)
OASIS_M1830: 03
HOME_HEALTH_OASIS: 01

## 2018-11-26 ENCOUNTER — HOME CARE VISIT (OUTPATIENT)
Dept: HOME HEALTH SERVICES | Facility: HOME HEALTHCARE | Age: 43
End: 2018-11-26
Payer: MEDICAID

## 2018-11-26 ENCOUNTER — TELEPHONE (OUTPATIENT)
Dept: HEALTH INFORMATION MANAGEMENT | Facility: OTHER | Age: 43
End: 2018-11-26

## 2018-11-26 PROCEDURE — G0300 HHS/HOSPICE OF LPN EA 15 MIN: HCPCS

## 2018-11-26 NOTE — TELEPHONE ENCOUNTER
Referral from [facility]. Med review completed. No clinically significant medication related issues noted.

## 2018-11-27 ENCOUNTER — HOME CARE VISIT (OUTPATIENT)
Dept: HOME HEALTH SERVICES | Facility: HOME HEALTHCARE | Age: 43
End: 2018-11-27
Payer: MEDICAID

## 2018-11-27 VITALS
OXYGEN SATURATION: 98 % | SYSTOLIC BLOOD PRESSURE: 126 MMHG | TEMPERATURE: 97.9 F | RESPIRATION RATE: 20 BRPM | HEART RATE: 77 BPM | DIASTOLIC BLOOD PRESSURE: 72 MMHG

## 2018-11-27 PROCEDURE — G0151 HHCP-SERV OF PT,EA 15 MIN: HCPCS

## 2018-11-27 ASSESSMENT — ENCOUNTER SYMPTOMS
DEPRESSED MOOD: 1
SEVERE DYSPNEA: 1

## 2018-11-27 ASSESSMENT — ACTIVITIES OF DAILY LIVING (ADL)
IADLS_COMMENTS: <!--EPICS-->SEE OT EVAL<!--EPICE-->
ADLS_COMMENTS: <!--EPICS-->SEE OT EVAL<!--EPICE-->

## 2018-11-28 ENCOUNTER — HOME CARE VISIT (OUTPATIENT)
Dept: HOME HEALTH SERVICES | Facility: HOME HEALTHCARE | Age: 43
End: 2018-11-28
Payer: MEDICAID

## 2018-11-28 VITALS
DIASTOLIC BLOOD PRESSURE: 74 MMHG | TEMPERATURE: 98.7 F | OXYGEN SATURATION: 98 % | HEART RATE: 72 BPM | SYSTOLIC BLOOD PRESSURE: 128 MMHG | RESPIRATION RATE: 18 BRPM

## 2018-11-28 VITALS
DIASTOLIC BLOOD PRESSURE: 65 MMHG | RESPIRATION RATE: 20 BRPM | HEART RATE: 73 BPM | TEMPERATURE: 99 F | SYSTOLIC BLOOD PRESSURE: 105 MMHG | OXYGEN SATURATION: 98 %

## 2018-11-28 PROCEDURE — G0152 HHCP-SERV OF OT,EA 15 MIN: HCPCS

## 2018-11-28 SDOH — ECONOMIC STABILITY: HOUSING INSECURITY
HOME SAFETY: PT RECENTLY MOVED TO RENO FROM FRESNO; IS TEMPORARILY LIVING W MULITPLE FAMILY MEMBERS IN A VERY CLUTTERED AND DIRTY 2 STORY APT; BEDROOM AND FULL BATH UPSTAIRS; 1/2 BATH DOWNSTAIRS; PT CURRENTLY UNABLE TO MANAGE STAIRS

## 2018-11-28 ASSESSMENT — ACTIVITIES OF DAILY LIVING (ADL)
ORAL_CARE_ASSISTANCE: 0
HOUSEKEEPING_ASSISTANCE: 6
DRESSING_LB_ASSISTANCE: 1
TRANSPORTATION_ASSISTANCE: 6
DRESSING_UB_ASSISTANCE: 1
TOILETING_ASSISTANCE: 0
LAUNDRY_ASSISTANCE: 6
BATHING_ASSISTANCE: 2
GROOMING_ASSISTANCE: 0
MEAL_PREP_ASSISTANCE: 6
EATING_ASSISTANCE: 0
TELEPHONE_ASSISTANCE: 0
SHOPPING_ASSISTANCE: 6

## 2018-11-28 ASSESSMENT — ENCOUNTER SYMPTOMS
DIFFICULTY THINKING: 1
DEPRESSED MOOD: 1
SEVERE DYSPNEA: 1

## 2018-11-29 ENCOUNTER — HOME CARE VISIT (OUTPATIENT)
Dept: HOME HEALTH SERVICES | Facility: HOME HEALTHCARE | Age: 43
End: 2018-11-29
Payer: MEDICAID

## 2018-11-29 VITALS
DIASTOLIC BLOOD PRESSURE: 62 MMHG | OXYGEN SATURATION: 98 % | HEART RATE: 71 BPM | TEMPERATURE: 98 F | RESPIRATION RATE: 20 BRPM | SYSTOLIC BLOOD PRESSURE: 104 MMHG

## 2018-11-29 PROCEDURE — G0495 RN CARE TRAIN/EDU IN HH: HCPCS

## 2018-11-29 ASSESSMENT — ENCOUNTER SYMPTOMS
SEVERE DYSPNEA: 1
NAUSEA: DENIES
SHORTNESS OF BREATH: T
DIFFICULTY THINKING: 1
VOMITING: DENIES

## 2018-11-30 ENCOUNTER — HOME CARE VISIT (OUTPATIENT)
Dept: HOME HEALTH SERVICES | Facility: HOME HEALTHCARE | Age: 43
End: 2018-11-30
Payer: MEDICAID

## 2018-11-30 ENCOUNTER — SLEEP CENTER VISIT (OUTPATIENT)
Dept: SLEEP MEDICINE | Facility: MEDICAL CENTER | Age: 43
End: 2018-11-30
Payer: MEDICAID

## 2018-11-30 VITALS
TEMPERATURE: 97.9 F | WEIGHT: 293 LBS | RESPIRATION RATE: 20 BRPM | SYSTOLIC BLOOD PRESSURE: 124 MMHG | OXYGEN SATURATION: 95 % | HEIGHT: 62 IN | DIASTOLIC BLOOD PRESSURE: 82 MMHG | HEART RATE: 84 BPM | BODY MASS INDEX: 53.92 KG/M2

## 2018-11-30 DIAGNOSIS — J44.9 CHRONIC OBSTRUCTIVE PULMONARY DISEASE, UNSPECIFIED COPD TYPE (HCC): ICD-10-CM

## 2018-11-30 DIAGNOSIS — G47.33 OSA (OBSTRUCTIVE SLEEP APNEA): ICD-10-CM

## 2018-11-30 PROCEDURE — 99244 OFF/OP CNSLTJ NEW/EST MOD 40: CPT | Performed by: INTERNAL MEDICINE

## 2018-11-30 RX ORDER — ZOLPIDEM TARTRATE 5 MG/1
5 TABLET ORAL NIGHTLY PRN
Qty: 3 TAB | Refills: 0 | Status: SHIPPED | OUTPATIENT
Start: 2018-11-30 | End: 2018-12-02

## 2018-11-30 NOTE — PROGRESS NOTES
Chief Complaint   Patient presents with   • Establish Care     Referred by Dr.John Espinal for KATHIA       HPI: This patient is a 43 y.o. Female who is referred for sleep apnea.  She moved from [place], [place] over the past month and was recently hospitalized for acute hypoxemic respiratory failure, requiring BiPAP therapy, attributed to obesity hypoventilation.  She allegedly had a sleep study in [place] however was never set up on CPAP before she moved.  She was discharged from the hospital on supplemental oxygen at 3 L/min and BiPAP: 12/8 cm of water.  She had been a smoker up until the past 6 months and carries a diagnosis of COPD although denies having pulmonary function testing.  She lives with smokers, however they smoke outside the household.  She has been prescribed inhalers (Breo, Albuterol), which she uses prn.  Her BMI is >54.  Her dyspnea has improved since discharge, and she denies cough, wheezing or chest tightness.  She has mild lower extremity edema.  Echocardiography showed normal left ventricular systolic function with RVSP estimated at 60 mmHg.  Chest x-ray showed cardiomegaly with pulmonary edema.      Past Medical History:   Diagnosis Date   • Asthma    • Bipolar 2 disorder (HCC)    • Chickenpox    • Other psychological stress    • Schizophrenic disorder (HCC)        Social History     Social History   • Marital status: Single     Spouse name: N/A   • Number of children: N/A   • Years of education: N/A     Occupational History   • Not on file.     Social History Main Topics   • Smoking status: Former Smoker     Packs/day: 1.00     Years: 20.00     Types: Cigarettes     Quit date: 5/12/2018   • Smokeless tobacco: Never Used      Comment: since 1992, trying to quit   • Alcohol use No   • Drug use: No   • Sexual activity: Not on file     Other Topics Concern   • Not on file     Social History Narrative    From [place]       Family History   Problem Relation Age of Onset   • No Known Problems Mother    •  Cancer Sister        Current Outpatient Prescriptions on File Prior to Visit   Medication Sig Dispense Refill   • Loratadine 10 MG Cap Take 1 Tab by mouth 1 time daily as needed (allergy).     • metronidazole (METROCREAM) 0.75 % cream Apply 1 Application to affected area(s) every day.     • Fluticasone Propionate, Inhal, 50 MCG/BLIST AEROSOL POWDER, BREATH ACTIVATED Spray 1 Spray in nose 1 time daily as needed (allergy).     • non-formulary med Spray 2 L/min in nose Continuous.     • nystatin (MYCOSTATIN) powder Apply 1 g to affected area(s) 2 Times a Day.     • aripiprazole (ABILIFY) 30 MG tablet Take 30 mg by mouth every morning.     • lithium (ESKALITH) 300 MG Tab Take 300-600 mg by mouth 2 Times a Day. 300 mg in the morning and 600 mg at bedtime      • doxepin (SINEQUAN) 25 MG Cap Take 25 mg by mouth every bedtime.     • benztropine (COGENTIN) 1 MG Tab Take 1 mg by mouth every morning.     • Fluticasone Furoate-Vilanterol (BREO ELLIPTA) 100-25 MCG/INH AEROSOL POWDER, BREATH ACTIVATED Inhale 1 Puff by mouth every day.     • doxycycline (VIBRAMYCIN) 100 MG Tab Take 100 mg by mouth every day.     • ibuprofen (MOTRIN) 800 MG Tab Take 800 mg by mouth 1 time daily as needed (for pain).     • albuterol 108 (90 Base) MCG/ACT Aero Soln inhalation aerosol Inhale 2 Puffs by mouth every 6 hours as needed for Shortness of Breath.       No current facility-administered medications on file prior to visit.        Allergies: Amoxicillin    ROS:   Constitutional: Denies fevers, chills, night sweats, +fatigue, denies weight loss  Eyes: Denies vision loss, pain, drainage, double vision  Ears, Nose, Throat: Denies earache, difficulty hearing, tinnitus, nasal congestion, hoarseness  Cardiovascular: Denies chest pain, tightness, palpitations, orthopnea or edema  Respiratory: As in HPI  Sleep: +daytime sleepiness, snoring, apneas, insomnia, denies morning headaches  GI: Denies heartburn, dysphagia, nausea, abdominal pain, diarrhea or  "constipation  : Denies frequent urination, hematuria, discharge or painful urination  Musculoskeletal: Denies back pain, painful joints, sore muscles  Neurological: Denies weakness or headaches  Skin: No rashes    Blood pressure 124/82, pulse 84, temperature 36.6 °C (97.9 °F), temperature source Temporal, resp. rate 20, height 1.575 m (5' 2\"), weight (!) 136.3 kg (300 lb 8 oz), SpO2 95 %, not currently breastfeeding.    Physical Exam:  Appearance: Well-nourished, well-developed, in no acute distress  HEENT: Normocephalic, atraumatic, white sclera, PERRLA, oropharynx clear, Mallampati 4  Neck: No adenopathy or masses  Respiratory: no intercostal retractions or accessory muscle use  Lungs auscultation: Clear to auscultation bilaterally  Cardiovascular: Regular rate rhythm. No murmurs, rubs or gallops.  1+ LE edema  Abdomen: soft, obese  Gait: Normal  Digits: No clubbing, cyanosis  Motor: No focal deficits  Orientation: Oriented to time, person and place    Diagnosis:  1. KATHIA (obstructive sleep apnea)  Polysomnography, 4 or More    zolpidem (AMBIEN) 5 MG Tab   2. BMI 50.0-59.9, adult ([facility])  OBESITY COUNSELING (No Charge): Patient identified as having weight management issue.  Appropriate orders and counseling given.   3. Chronic obstructive pulmonary disease, unspecified COPD type ([facility])  PULMONARY FUNCTION TESTS -Test requested: Complete Pulmonary Function Test   4.      Pulmonary hypertension      Plan:  The patient has obesity hypoventilation syndrome, was recently hospitalized for acute hypoxemic respiratory failure since moving from [place].  She has secondary pulmonary hypertension.  We will arrange for polysomnography in the sleep laboratory for evaluation of KATHIA and accurate CPAP/BiPAP titration.  Continue oxygen at 2 L/min 24/7 in the interim.  Obtain pulmonary function testing with DLCO.  Avoid smoking as well as secondhand smoke.  Patient's body mass index is 54.96 kg/m². Exercise and nutrition counseling " were performed at this visit.  Return for after sleep study, after PFT.

## 2018-12-03 ENCOUNTER — HOME CARE VISIT (OUTPATIENT)
Dept: HOME HEALTH SERVICES | Facility: HOME HEALTHCARE | Age: 43
End: 2018-12-03
Payer: MEDICAID

## 2018-12-04 ENCOUNTER — HOME CARE VISIT (OUTPATIENT)
Dept: HOME HEALTH SERVICES | Facility: HOME HEALTHCARE | Age: 43
End: 2018-12-04
Payer: MEDICAID

## 2018-12-04 VITALS
RESPIRATION RATE: 18 BRPM | HEART RATE: 82 BPM | SYSTOLIC BLOOD PRESSURE: 124 MMHG | TEMPERATURE: 98.4 F | OXYGEN SATURATION: 97 % | DIASTOLIC BLOOD PRESSURE: 68 MMHG

## 2018-12-04 VITALS
DIASTOLIC BLOOD PRESSURE: 50 MMHG | HEART RATE: 75 BPM | TEMPERATURE: 98.8 F | RESPIRATION RATE: 20 BRPM | SYSTOLIC BLOOD PRESSURE: 98 MMHG | OXYGEN SATURATION: 96 %

## 2018-12-04 PROCEDURE — G0495 RN CARE TRAIN/EDU IN HH: HCPCS

## 2018-12-04 PROCEDURE — G0151 HHCP-SERV OF PT,EA 15 MIN: HCPCS

## 2018-12-04 ASSESSMENT — ENCOUNTER SYMPTOMS
VOMITING: DENIES
NAUSEA: DENIES

## 2018-12-05 ENCOUNTER — HOME CARE VISIT (OUTPATIENT)
Dept: HOME HEALTH SERVICES | Facility: HOME HEALTHCARE | Age: 43
End: 2018-12-05
Payer: MEDICAID

## 2018-12-05 VITALS
HEART RATE: 69 BPM | DIASTOLIC BLOOD PRESSURE: 70 MMHG | RESPIRATION RATE: 18 BRPM | OXYGEN SATURATION: 98 % | SYSTOLIC BLOOD PRESSURE: 120 MMHG | TEMPERATURE: 98.6 F

## 2018-12-05 PROCEDURE — G0152 HHCP-SERV OF OT,EA 15 MIN: HCPCS

## 2018-12-05 ASSESSMENT — ENCOUNTER SYMPTOMS
DIFFICULTY THINKING: 1
SEVERE DYSPNEA: 1

## 2018-12-06 ENCOUNTER — HOME CARE VISIT (OUTPATIENT)
Dept: HOME HEALTH SERVICES | Facility: HOME HEALTHCARE | Age: 43
End: 2018-12-06
Payer: MEDICAID

## 2018-12-06 ENCOUNTER — NON-PROVIDER VISIT (OUTPATIENT)
Dept: PULMONOLOGY | Facility: HOSPICE | Age: 43
End: 2018-12-06
Attending: INTERNAL MEDICINE
Payer: MEDICAID

## 2018-12-06 VITALS
HEART RATE: 71 BPM | DIASTOLIC BLOOD PRESSURE: 58 MMHG | TEMPERATURE: 99.7 F | SYSTOLIC BLOOD PRESSURE: 102 MMHG | RESPIRATION RATE: 18 BRPM | OXYGEN SATURATION: 96 %

## 2018-12-06 VITALS — HEIGHT: 61 IN | BODY MASS INDEX: 55.32 KG/M2 | WEIGHT: 293 LBS

## 2018-12-06 DIAGNOSIS — J44.9 CHRONIC OBSTRUCTIVE PULMONARY DISEASE, UNSPECIFIED COPD TYPE (HCC): ICD-10-CM

## 2018-12-06 PROCEDURE — 94729 DIFFUSING CAPACITY: CPT | Performed by: INTERNAL MEDICINE

## 2018-12-06 PROCEDURE — G0151 HHCP-SERV OF PT,EA 15 MIN: HCPCS

## 2018-12-06 PROCEDURE — 94060 EVALUATION OF WHEEZING: CPT | Performed by: INTERNAL MEDICINE

## 2018-12-06 PROCEDURE — 94726 PLETHYSMOGRAPHY LUNG VOLUMES: CPT | Performed by: INTERNAL MEDICINE

## 2018-12-06 ASSESSMENT — PULMONARY FUNCTION TESTS
FVC_LLN: 2.72
FEV1_LLN: 2.20
FEV1/FVC_PERCENT_CHANGE: 3
FEV1_PREDICTED: 2.63
FEV1_PERCENT_PREDICTED: 41
FEV1/FVC_PERCENT_PREDICTED: 108
FEV1/FVC: 84
FEV1_PERCENT_CHANGE: 7
FVC_PREDICTED: 3.26
FEV1_PERCENT_CHANGE: 11
FEV1/FVC: 84
FEV1/FVC_PERCENT_CHANGE: 157
FVC: 1.38
FEV1_PERCENT_PREDICTED: 45
FVC_PERCENT_PREDICTED: 39
FEV1: 1.08
FEV1/FVC_PREDICTED: 81
FEV1/FVC_PERCENT_PREDICTED: 107
FEV1/FVC_PERCENT_PREDICTED: 104
FVC_PERCENT_PREDICTED: 42
FEV1/FVC: 88
FEV1/FVC: 87.68
FEV1: 1.21
FEV1/FVC_PERCENT_LLN: 68
FVC: 1.28
FEV1/FVC_PERCENT_PREDICTED: 81
FEV1/FVC_PERCENT_PREDICTED: 105

## 2018-12-06 NOTE — PROCEDURES
Technician: BRETT Johnson    Technician Comment:  Good patient effort & cooperation.  The results of this test meet the ATS/ERS standards for acceptability & reproducibility.  Test was performed on the [employer] Body Plethysmograph-Elite DX system.  Predicted values were [facility], [place]-3 for spirometry, [facility] for DLCO, ITS for Lung Volumes.  The DLCO was uncorrected for Hgb.  A bronchodilator of Ventolin HFA -2puffs via spacer administered.  DLCO performed during dilation period.    Interpretation:    Lung function testing was completed on December 6, 2018.  Mid flows are 41% predicted.  FEV1 is 1.08 L, 41% predicted.  FEV1/FVC ratio is 104%.  Bronchodilator response was seen.  Hyperinflation is noted.  Restrictive process evident with total lung capacity at 77% predicted.  Oxygen transfer low normal at 87% predicted.  Pattern appears to be combined obstruction and restriction, clinical correlation needed

## 2018-12-07 ENCOUNTER — HOME CARE VISIT (OUTPATIENT)
Dept: HOME HEALTH SERVICES | Facility: HOME HEALTHCARE | Age: 43
End: 2018-12-07
Payer: MEDICAID

## 2018-12-07 VITALS
OXYGEN SATURATION: 94 % | RESPIRATION RATE: 20 BRPM | SYSTOLIC BLOOD PRESSURE: 118 MMHG | HEART RATE: 67 BPM | DIASTOLIC BLOOD PRESSURE: 70 MMHG | TEMPERATURE: 98.9 F

## 2018-12-07 PROCEDURE — G0156 HHCP-SVS OF AIDE,EA 15 MIN: HCPCS

## 2018-12-07 PROCEDURE — G0300 HHS/HOSPICE OF LPN EA 15 MIN: HCPCS

## 2018-12-07 ASSESSMENT — ENCOUNTER SYMPTOMS
VOMITING: DENIES
NAUSEA: DENIES
DIFFICULTY THINKING: 1
NAUSEA: DENIES
SEVERE DYSPNEA: 1
VOMITING: DENIES

## 2018-12-08 ENCOUNTER — SLEEP STUDY (OUTPATIENT)
Dept: SLEEP MEDICINE | Facility: MEDICAL CENTER | Age: 43
End: 2018-12-08
Attending: INTERNAL MEDICINE
Payer: MEDICAID

## 2018-12-08 DIAGNOSIS — G47.33 OSA (OBSTRUCTIVE SLEEP APNEA): ICD-10-CM

## 2018-12-08 PROCEDURE — 95811 POLYSOM 6/>YRS CPAP 4/> PARM: CPT | Performed by: INTERNAL MEDICINE

## 2018-12-09 ENCOUNTER — HOME CARE VISIT (OUTPATIENT)
Dept: HOME HEALTH SERVICES | Facility: HOME HEALTHCARE | Age: 43
End: 2018-12-09
Payer: MEDICAID

## 2018-12-09 VITALS
DIASTOLIC BLOOD PRESSURE: 70 MMHG | RESPIRATION RATE: 20 BRPM | SYSTOLIC BLOOD PRESSURE: 118 MMHG | HEART RATE: 67 BPM | TEMPERATURE: 98 F | OXYGEN SATURATION: 20 %

## 2018-12-10 ENCOUNTER — HOME CARE VISIT (OUTPATIENT)
Dept: HOME HEALTH SERVICES | Facility: HOME HEALTHCARE | Age: 43
End: 2018-12-10
Payer: MEDICAID

## 2018-12-10 PROCEDURE — G0151 HHCP-SERV OF PT,EA 15 MIN: HCPCS

## 2018-12-10 NOTE — PROCEDURES
CLINICAL COMMENTS:  The patient underwent a split night polysomnogram with a CPAP titration using the standard montage for measurement of parameters of sleep, respiratory events, movement abnormalities, heart rate and rhythm. A microphone was used to monitor snoring.    INTERPRETATION: The study was started at 8:22:40 PM.  The diagnostic recording time was 290.4 minutes with a sleep period of 239.3 minutes.  Total sleep time was 150.3 minutes with a sleep efficiency of 51.8%.  The sleep latency was 51.1 minutes, and REM latency was N/A minutes.  The patient had 61 arousals in total, for an arousal index of 24.4.        RESPIRATORY: The patient had 200 apneas in total.  Of these, 200 were obstructive apneas, and 0 were central apneas.  This resulted in an apnea index (AI) of 79.8.  The patient had 126 hypopneas in total, which resulted in a hypopnea index of 50.3.  The overall AHI was 130.1, while the AHI during REM was N/A.  The supine AHI = N/A.    OXIMETRY: Oxygen saturation monitoring showed a mean SpO2 of 90.2% for the diagnostic part of the study, with a minimum oxygen saturation of 79.0%.  Oxygen saturations were below 89% for 44.7% of sleep time.    CARDIAC: The highest heart rate for the first part of the study was 99.0 beats per minute.  The average heart rate during sleep was 75.5 bpm, while the highest heart rate was 99.0 bpm.    LIMB MOVEMENTS: There were a total of 2 periodic limb movements during sleep, of which 0 were PLMS arousals.  This resulted in a PLMS index of 0.8 and a PLMS arousal index of 0.0.    TREATMENT    Treatment recording time was 289.3 minutes with a total sleep time of 183.1min.  The patient had an arousal index of 24.3.      RESPIRATORY: The patient had 54 obstructive apneas, 58 central apneas, and 462 hypopneas for an overall AHI was 188.1.    OXIMETRY: The mean SpO2 during treatment was 84.4%, with a minimum oxygen saturation of 64.0%.      CPAP was tried from 5 to [id number].  BIPAP  was tried from 14/10 to 19/13cm H2O.      RECORDING TECHNIQUE:       After the scalp was prepared, gold plated electrodes were applied to the scalp according to the International 10-20 System. EEG (electroencephalogram) was continuously monitored from the O1-M2, O2-M1, C3-M2, C4-M1, F3-M2, and F4-M1.   EOGs (electrooculograms) were monitored by electrodes placed at the left and right outer canthi.  Chin EMG (electromyogram) was monitored by electrodes placed on the mentalis and sub-mentalis muscles.  Nasal and oral airflow were monitored using a triple port thermocouple as well as oronasal pressure transducer.  Respiratory effort was measured by inductive plethysmography technology employing abdominal and thoracic belts.  Blood oxygen saturation and pulse were monitored by pulse oximetry.  Heart rhythm was monitored by surface electrocardiogram.  Leg EMG was studied using surface electrodes placed on left and right anterior tibialis.  A microphone was used to monitor tracheal sounds and snoring.  Body position was monitored and documented by technician observation      SUMMARY:    This was an overnight diagnostic polysomnogram with a subsequent positive airway pressure titration, also known as a split- night study.  The patient chose to use a small Dreamwear mask with heated humidification.     During the diagnostic phase the total recording time was 290.4 minutes, the sleep period time was 239.3 minutes, and the total sleep time was 150.3 minutes.  The patient's sleep efficiency was 51.8 % which is fairly reduced.  The patient experienced no REM periods.    The sleep stage durations revealed 89 minutes of wake after sleep onset (WASO), 10.5 minutes of N1 sleep, 134.3 minutes of N2 sleep, 5.5 minutes of N3 sleep, and 0 minutes of REM.    The latency to sleep was 51.1 minutes which is prolonged.  The latency to REM was not applicable as the patient did not achieve REM.   Moderate sleep fragmentation occurred.  The  arousal index was 24.4.  The Total Limb Movement (isolated) Index was 0.8, the Limb Movement with Arousal Index was 2.0, and the PLM Series Index was 2.0.    The patient experienced 200 apneas and 126 hypopneas and 1 RERAS.  The apnea hypopnea index was 130.1, the RDI was 130.4, the mean event duration was 10.6 seconds, and the longest event lasted to 5.7 seconds.  The REM index was not applicable and the supine index was not applicable.  The apnea hypopnea index represents extremely severe sleep apnea hypopnea syndrome.    The marina saturation during sleep was 79 % and the patient spent 50.6 % of the recording with saturations less than or equal to 90%.    During sleep, the minimum heart rate was artifactual, the mean heart rate was 71 bpm, and the maximum heart rate was 99 bpm.    Once the patient met the split-night protocol, the technician performed a positive airway pressure titration.  The technician initiated treatment with CPAP set at 5 cmH2O and increased the pressure to a maximum of 12 cmH2O. bilevel was also tried at multiple pressures.    The patient failed to normalize the apnea hypopnea index on any tested pressure and on most occasions the apnea hypotony index actually increased significantly.    ASSESSMENT:    Very severe obstructive sleep apnea hypopnea - .1  Severe nocturnal desaturation - marina saturation 79 % - saturations <90% for 50.6% of the diagnostic recording        RECOMMENDATION:    Recommend the patient return for a dedicated full night positive airway pressure titration.  Consider IVAPS.

## 2018-12-11 ENCOUNTER — HOME CARE VISIT (OUTPATIENT)
Dept: HOME HEALTH SERVICES | Facility: HOME HEALTHCARE | Age: 43
End: 2018-12-11
Payer: MEDICAID

## 2018-12-11 VITALS
DIASTOLIC BLOOD PRESSURE: 72 MMHG | TEMPERATURE: 98.8 F | HEART RATE: 72 BPM | OXYGEN SATURATION: 96 % | RESPIRATION RATE: 18 BRPM | SYSTOLIC BLOOD PRESSURE: 116 MMHG

## 2018-12-11 PROCEDURE — G0152 HHCP-SERV OF OT,EA 15 MIN: HCPCS

## 2018-12-11 PROCEDURE — G0299 HHS/HOSPICE OF RN EA 15 MIN: HCPCS

## 2018-12-11 ASSESSMENT — ENCOUNTER SYMPTOMS
DIFFICULTY THINKING: 1
SEVERE DYSPNEA: 1

## 2018-12-12 ENCOUNTER — HOME CARE VISIT (OUTPATIENT)
Dept: HOME HEALTH SERVICES | Facility: HOME HEALTHCARE | Age: 43
End: 2018-12-12
Payer: MEDICAID

## 2018-12-12 VITALS
RESPIRATION RATE: 18 BRPM | OXYGEN SATURATION: 96 % | SYSTOLIC BLOOD PRESSURE: 116 MMHG | HEART RATE: 72 BPM | DIASTOLIC BLOOD PRESSURE: 72 MMHG | TEMPERATURE: 98.8 F

## 2018-12-12 ASSESSMENT — ENCOUNTER SYMPTOMS
SHORTNESS OF BREATH: T
NAUSEA: DENIES
VOMITING: DENIES

## 2018-12-13 ENCOUNTER — HOME CARE VISIT (OUTPATIENT)
Dept: HOME HEALTH SERVICES | Facility: HOME HEALTHCARE | Age: 43
End: 2018-12-13
Payer: MEDICAID

## 2018-12-13 VITALS
TEMPERATURE: 99.4 F | HEART RATE: 65 BPM | OXYGEN SATURATION: 98 % | RESPIRATION RATE: 18 BRPM | DIASTOLIC BLOOD PRESSURE: 58 MMHG | SYSTOLIC BLOOD PRESSURE: 100 MMHG

## 2018-12-13 VITALS
OXYGEN SATURATION: 96 % | SYSTOLIC BLOOD PRESSURE: 98 MMHG | DIASTOLIC BLOOD PRESSURE: 58 MMHG | RESPIRATION RATE: 16 BRPM | HEART RATE: 66 BPM | TEMPERATURE: 99.6 F

## 2018-12-13 PROCEDURE — G0151 HHCP-SERV OF PT,EA 15 MIN: HCPCS

## 2018-12-13 PROCEDURE — G0299 HHS/HOSPICE OF RN EA 15 MIN: HCPCS

## 2018-12-13 PROCEDURE — G0152 HHCP-SERV OF OT,EA 15 MIN: HCPCS

## 2018-12-13 ASSESSMENT — ACTIVITIES OF DAILY LIVING (ADL)
DRESSING_UB_ASSISTANCE: 1
EATING_ASSISTANCE: 1
ORAL_CARE_ASSISTANCE: 0
BATHING_ASSISTIVE_EQUIPMENT_USED: SHOWER CHAIR
DRESSING_LB_ASSISTANCE: 2
BATHING_ASSISTIVE_EQUIPMENT_NEEDED: TUB BENCH
SHOPPING_ASSISTANCE: 6
TELEPHONE_ASSISTANCE: 0
GROOMING_ASSISTANCE: 1
MEAL_PREP_ASSISTANCE: 1
HOUSEKEEPING_ASSISTANCE: 6
BATHING_ASSISTANCE: 5
TOILETING_ASSISTANCE: 0
TRANSPORTATION_ASSISTANCE: 6

## 2018-12-13 ASSESSMENT — ENCOUNTER SYMPTOMS
VOMITING: DENIES
NAUSEA: DENIES
DIFFICULTY THINKING: 1
SHORTNESS OF BREATH: T
SEVERE DYSPNEA: 1

## 2018-12-14 ENCOUNTER — HOME CARE VISIT (OUTPATIENT)
Dept: HOME HEALTH SERVICES | Facility: HOME HEALTHCARE | Age: 43
End: 2018-12-14
Payer: MEDICAID

## 2018-12-14 VITALS
HEART RATE: 72 BPM | SYSTOLIC BLOOD PRESSURE: 108 MMHG | TEMPERATURE: 99.8 F | DIASTOLIC BLOOD PRESSURE: 62 MMHG | RESPIRATION RATE: 18 BRPM | OXYGEN SATURATION: 95 %

## 2018-12-14 PROCEDURE — G0156 HHCP-SVS OF AIDE,EA 15 MIN: HCPCS

## 2018-12-15 VITALS
RESPIRATION RATE: 18 BRPM | OXYGEN SATURATION: 98 % | TEMPERATURE: 98.3 F | SYSTOLIC BLOOD PRESSURE: 127 MMHG | DIASTOLIC BLOOD PRESSURE: 60 MMHG | HEART RATE: 67 BPM

## 2018-12-17 ENCOUNTER — HOME CARE VISIT (OUTPATIENT)
Dept: HOME HEALTH SERVICES | Facility: HOME HEALTHCARE | Age: 43
End: 2018-12-17
Payer: MEDICAID

## 2018-12-17 VITALS
RESPIRATION RATE: 18 BRPM | TEMPERATURE: 99.3 F | DIASTOLIC BLOOD PRESSURE: 58 MMHG | OXYGEN SATURATION: 96 % | HEART RATE: 67 BPM | SYSTOLIC BLOOD PRESSURE: 92 MMHG

## 2018-12-17 VITALS
HEART RATE: 67 BPM | DIASTOLIC BLOOD PRESSURE: 58 MMHG | SYSTOLIC BLOOD PRESSURE: 92 MMHG | RESPIRATION RATE: 18 BRPM | OXYGEN SATURATION: 96 % | TEMPERATURE: 99.3 F

## 2018-12-17 PROCEDURE — G0152 HHCP-SERV OF OT,EA 15 MIN: HCPCS

## 2018-12-17 PROCEDURE — G0151 HHCP-SERV OF PT,EA 15 MIN: HCPCS

## 2018-12-17 ASSESSMENT — ENCOUNTER SYMPTOMS
DIFFICULTY THINKING: 1
SEVERE DYSPNEA: 1
DEPRESSED MOOD: 1

## 2018-12-18 ENCOUNTER — HOME CARE VISIT (OUTPATIENT)
Dept: HOME HEALTH SERVICES | Facility: HOME HEALTHCARE | Age: 43
End: 2018-12-18
Payer: MEDICAID

## 2018-12-18 VITALS
TEMPERATURE: 97.7 F | OXYGEN SATURATION: 98 % | HEART RATE: 66 BPM | SYSTOLIC BLOOD PRESSURE: 108 MMHG | DIASTOLIC BLOOD PRESSURE: 64 MMHG | RESPIRATION RATE: 16 BRPM

## 2018-12-18 PROCEDURE — G0495 RN CARE TRAIN/EDU IN HH: HCPCS

## 2018-12-18 SDOH — ECONOMIC STABILITY: HOUSING INSECURITY: UNSAFE COOKING RANGE AREA: 0

## 2018-12-18 SDOH — ECONOMIC STABILITY: HOUSING INSECURITY: UNSAFE APPLIANCES: 0

## 2018-12-19 ENCOUNTER — HOME CARE VISIT (OUTPATIENT)
Dept: HOME HEALTH SERVICES | Facility: HOME HEALTHCARE | Age: 43
End: 2018-12-19
Payer: MEDICAID

## 2018-12-19 VITALS
DIASTOLIC BLOOD PRESSURE: 52 MMHG | HEART RATE: 65 BPM | OXYGEN SATURATION: 97 % | RESPIRATION RATE: 18 BRPM | TEMPERATURE: 99 F | SYSTOLIC BLOOD PRESSURE: 92 MMHG

## 2018-12-19 PROCEDURE — G0152 HHCP-SERV OF OT,EA 15 MIN: HCPCS

## 2018-12-19 PROCEDURE — G0151 HHCP-SERV OF PT,EA 15 MIN: HCPCS

## 2018-12-19 SDOH — ECONOMIC STABILITY: HOUSING INSECURITY
HOME SAFETY: THIS 2 LEVEL APT REMAINS DIRTY AND CLUTTERED; PT'S MOTHER REPORTS EVENTUALLY THEY HOPE TO FIND A SINGLE STORY HOME TO RENT WITH ALL FAMILY MEMBERS BUT FOR NOW PT WILL REMAIN IN THIS ENVIRONMENT

## 2018-12-19 ASSESSMENT — ACTIVITIES OF DAILY LIVING (ADL)
BATHING_ASSISTIVE_EQUIPMENT_USED: SHOWER CHAIR
TELEPHONE_ASSISTANCE: 0
MEAL_PREP_ASSISTANCE: 4
HOUSEKEEPING_ASSISTANCE: 6
LAUNDRY_ASSISTANCE: 6
DRESSING_LB_ASSISTANCE: 0
BATHING_ASSISTIVE_EQUIPMENT_NEEDED: TUB BENCH
TOILETING_ASSISTANCE: 0
TRANSPORTATION_ASSISTANCE: 6
DRESSING_UB_ASSISTANCE: 0
ORAL_CARE_ASSISTANCE: 0
GROOMING_ASSISTANCE: 0
BATHING_ASSISTANCE: 5
EATING_ASSISTANCE: 0
SHOPPING_ASSISTANCE: 6

## 2018-12-19 ASSESSMENT — ENCOUNTER SYMPTOMS
POOR JUDGMENT: 1
SEVERE DYSPNEA: 1
DIFFICULTY THINKING: 1

## 2018-12-20 ENCOUNTER — HOME CARE VISIT (OUTPATIENT)
Dept: HOME HEALTH SERVICES | Facility: HOME HEALTHCARE | Age: 43
End: 2018-12-20
Payer: MEDICAID

## 2018-12-20 VITALS
RESPIRATION RATE: 18 BRPM | TEMPERATURE: 97.8 F | DIASTOLIC BLOOD PRESSURE: 60 MMHG | OXYGEN SATURATION: 94 % | SYSTOLIC BLOOD PRESSURE: 98 MMHG | HEART RATE: 62 BPM

## 2018-12-20 PROCEDURE — G0495 RN CARE TRAIN/EDU IN HH: HCPCS

## 2018-12-20 PROCEDURE — G0156 HHCP-SVS OF AIDE,EA 15 MIN: HCPCS

## 2018-12-20 SDOH — ECONOMIC STABILITY: HOUSING INSECURITY: UNSAFE COOKING RANGE AREA: 0

## 2018-12-20 SDOH — ECONOMIC STABILITY: HOUSING INSECURITY: UNSAFE APPLIANCES: 0

## 2018-12-21 ENCOUNTER — HOME CARE VISIT (OUTPATIENT)
Dept: HOME HEALTH SERVICES | Facility: HOME HEALTHCARE | Age: 43
End: 2018-12-21
Payer: MEDICAID

## 2018-12-21 VITALS
HEART RATE: 62 BPM | OXYGEN SATURATION: 94 % | TEMPERATURE: 97.8 F | DIASTOLIC BLOOD PRESSURE: 60 MMHG | RESPIRATION RATE: 18 BRPM | SYSTOLIC BLOOD PRESSURE: 98 MMHG

## 2018-12-21 VITALS
TEMPERATURE: 99 F | OXYGEN SATURATION: 97 % | RESPIRATION RATE: 18 BRPM | HEART RATE: 65 BPM | SYSTOLIC BLOOD PRESSURE: 92 MMHG | DIASTOLIC BLOOD PRESSURE: 52 MMHG

## 2018-12-21 SDOH — ECONOMIC STABILITY: HOUSING INSECURITY: UNSAFE APPLIANCES: 0

## 2018-12-21 SDOH — ECONOMIC STABILITY: HOUSING INSECURITY: UNSAFE COOKING RANGE AREA: 0

## 2018-12-21 SDOH — ECONOMIC STABILITY: HOUSING INSECURITY
HOME SAFETY: CLUTTERED HOME; TIGHT SPACES AND DIFFICULT FOR PATIENT TO MANEUVER THROUGH HOME.  STAIRS TO NEGOTIATE AND PATIENT SHOULD HAVE ASSISTANCE WHEN NEGOTIATING STAIRS

## 2018-12-24 ENCOUNTER — HOME CARE VISIT (OUTPATIENT)
Dept: HOME HEALTH SERVICES | Facility: HOME HEALTHCARE | Age: 43
End: 2018-12-24
Payer: MEDICAID

## 2018-12-26 ENCOUNTER — HOME CARE VISIT (OUTPATIENT)
Dept: HOME HEALTH SERVICES | Facility: HOME HEALTHCARE | Age: 43
End: 2018-12-26
Payer: MEDICAID

## 2018-12-26 PROCEDURE — G0156 HHCP-SVS OF AIDE,EA 15 MIN: HCPCS

## 2018-12-27 ENCOUNTER — HOME CARE VISIT (OUTPATIENT)
Dept: HOME HEALTH SERVICES | Facility: HOME HEALTHCARE | Age: 43
End: 2018-12-27
Payer: MEDICAID

## 2018-12-27 VITALS
OXYGEN SATURATION: 98 % | DIASTOLIC BLOOD PRESSURE: 80 MMHG | SYSTOLIC BLOOD PRESSURE: 120 MMHG | TEMPERATURE: 98.8 F | HEART RATE: 67 BPM | RESPIRATION RATE: 18 BRPM

## 2018-12-27 PROCEDURE — G0495 RN CARE TRAIN/EDU IN HH: HCPCS

## 2018-12-27 ASSESSMENT — ENCOUNTER SYMPTOMS
NAUSEA: DENIES
VOMITING: DENIES

## 2018-12-28 VITALS
TEMPERATURE: 98.2 F | DIASTOLIC BLOOD PRESSURE: 67 MMHG | BODY MASS INDEX: 56.12 KG/M2 | RESPIRATION RATE: 18 BRPM | OXYGEN SATURATION: 94 % | WEIGHT: 293 LBS | SYSTOLIC BLOOD PRESSURE: 117 MMHG | HEART RATE: 80 BPM

## 2018-12-31 ENCOUNTER — HOME CARE VISIT (OUTPATIENT)
Dept: HOME HEALTH SERVICES | Facility: HOME HEALTHCARE | Age: 43
End: 2018-12-31
Payer: MEDICAID

## 2018-12-31 PROCEDURE — G0156 HHCP-SVS OF AIDE,EA 15 MIN: HCPCS

## 2019-01-02 ENCOUNTER — HOME CARE VISIT (OUTPATIENT)
Dept: HOME HEALTH SERVICES | Facility: HOME HEALTHCARE | Age: 44
End: 2019-01-02
Payer: MEDICAID

## 2019-01-02 VITALS
SYSTOLIC BLOOD PRESSURE: 132 MMHG | RESPIRATION RATE: 18 BRPM | HEART RATE: 85 BPM | TEMPERATURE: 98.6 F | OXYGEN SATURATION: 95 % | DIASTOLIC BLOOD PRESSURE: 8 MMHG

## 2019-01-09 ENCOUNTER — HOME CARE VISIT (OUTPATIENT)
Dept: HOME HEALTH SERVICES | Facility: HOME HEALTHCARE | Age: 44
End: 2019-01-09
Payer: MEDICAID

## 2019-01-09 VITALS
SYSTOLIC BLOOD PRESSURE: 104 MMHG | TEMPERATURE: 97.2 F | DIASTOLIC BLOOD PRESSURE: 58 MMHG | HEART RATE: 62 BPM | OXYGEN SATURATION: 94 % | RESPIRATION RATE: 18 BRPM

## 2019-01-09 PROCEDURE — G0493 RN CARE EA 15 MIN HH/HOSPICE: HCPCS

## 2019-01-09 SDOH — ECONOMIC STABILITY: HOUSING INSECURITY
HOME SAFETY: PATIENT LIVES IN A MULTI-LEVEL HOME WITH NUMEROUS FAMILY MEMBERS AND DOGS.  PATIENT IS ON SUPPLEMENTAL OXYGEN. OXYGEN SAFETY RISK ASSESSMENT PERFORMED. PATIENT DOES HAVE A NO SMOKING SIGN POSTED IN THE HOME. PATIENT DOES HAVE A WORKING FIRE EXTINGUIS

## 2019-01-09 SDOH — ECONOMIC STABILITY: HOUSING INSECURITY
HOME SAFETY: HER PRESENT IN THE HOME. SMOKE ALARMS ARE PRESENT AND FUNCTIONAL ON EACH LEVEL OF THE HOME. PATIENT DOES HAVE A FIRE ESCAPE PLAN DEVELOPED. PATIENT DOES HAVE FLAMMABLE MATERIALS PRESENT IN THE HOME PRESENTING A FIRE HAZARD. EVIDENCE FOUND OF SMOKING

## 2019-01-09 SDOH — ECONOMIC STABILITY: HOUSING INSECURITY

## 2019-01-09 SDOH — ECONOMIC STABILITY: HOUSING INSECURITY: UNSAFE COOKING RANGE AREA: 0

## 2019-01-09 SDOH — ECONOMIC STABILITY: HOUSING INSECURITY: UNSAFE APPLIANCES: 0

## 2019-01-09 ASSESSMENT — ACTIVITIES OF DAILY LIVING (ADL): HOME_HEALTH_OASIS: 01

## 2019-01-09 ASSESSMENT — PATIENT HEALTH QUESTIONNAIRE - PHQ9: CLINICAL INTERPRETATION OF PHQ2 SCORE: 0

## 2019-01-10 ASSESSMENT — ACTIVITIES OF DAILY LIVING (ADL): OASIS_M1830: 02

## 2019-02-15 ENCOUNTER — SLEEP CENTER VISIT (OUTPATIENT)
Dept: SLEEP MEDICINE | Facility: MEDICAL CENTER | Age: 44
End: 2019-02-15
Payer: MEDICAID

## 2019-02-15 VITALS
HEART RATE: 61 BPM | WEIGHT: 293 LBS | RESPIRATION RATE: 16 BRPM | BODY MASS INDEX: 55.32 KG/M2 | SYSTOLIC BLOOD PRESSURE: 126 MMHG | HEIGHT: 61 IN | OXYGEN SATURATION: 90 % | DIASTOLIC BLOOD PRESSURE: 70 MMHG

## 2019-02-15 DIAGNOSIS — I27.20 PULMONARY HYPERTENSION (HCC): ICD-10-CM

## 2019-02-15 DIAGNOSIS — G47.33 OSA (OBSTRUCTIVE SLEEP APNEA): ICD-10-CM

## 2019-02-15 DIAGNOSIS — J44.9 CHRONIC OBSTRUCTIVE PULMONARY DISEASE, UNSPECIFIED COPD TYPE (HCC): ICD-10-CM

## 2019-02-15 DIAGNOSIS — J96.11 CHRONIC RESPIRATORY FAILURE WITH HYPOXIA (HCC): ICD-10-CM

## 2019-02-15 PROCEDURE — 99214 OFFICE O/P EST MOD 30 MIN: CPT | Performed by: INTERNAL MEDICINE

## 2019-02-15 RX ORDER — KETOCONAZOLE 20 MG/G
CREAM TOPICAL
COMMUNITY
Start: 2019-01-31 | End: 2019-02-14

## 2019-02-15 RX ORDER — ZOLPIDEM TARTRATE 5 MG/1
5 TABLET ORAL NIGHTLY PRN
Qty: 3 TAB | Refills: 0 | Status: SHIPPED | OUTPATIENT
Start: 2019-02-15 | End: 2019-02-17

## 2019-02-15 RX ORDER — PREDNISONE 20 MG/1
TABLET ORAL
COMMUNITY
Start: 2018-11-24 | End: 2019-02-14

## 2019-02-15 RX ORDER — LITHIUM CARBONATE 300 MG/1
CAPSULE ORAL
COMMUNITY
Start: 2018-11-27 | End: 2019-02-14

## 2019-02-15 NOTE — PROGRESS NOTES
Chief Complaint   Patient presents with   • Follow-Up     sleep study results, recieved bipap in hospital       HPI: This patient is a 43 y.o. Female who returns for sleep apnea.  She moved from [place], [place] over the past months and was hospitalized for acute hypoxemic respiratory failure, requiring BiPAP therapy, attributed to obesity hypoventilation.  She allegedly had a sleep study in [place] however was never set up on CPAP.  She was discharged from the hospital on supplemental oxygen at 3 L/min and BiPAP: 12/8 cm of water.    Of note she requires requalification of oxygen for her DME.  She had been a smoker up until the past 6 months and carries a diagnosis of COPD.  Pulmonary function testing showed restrictive physiology with total lung capacity: 77%, FVC of 42% with bronchodilator response.  Her diffusion capacity was 87%. She lives with smokers, however they smoke outside the household.  She has been prescribed inhalers (Breo, Albuterol), which she uses prn.  Her BMI is >54.  Echocardiography showed normal left ventricular systolic function with RVSP estimated at 60 mmHg.  Chest x-ray showed cardiomegaly with pulmonary edema.  In laboratory polysomnography showed very severe KATHIA with  events per hour associated with desaturations to a marina of 79%.  She failed CPAP and BiPAP titration.    Past Medical History:   Diagnosis Date   • Asthma    • Bipolar 2 disorder (HCC)    • Chickenpox    • Other psychological stress    • Schizophrenic disorder (HCC)        Social History     Social History   • Marital status: Single     Spouse name: N/A   • Number of children: N/A   • Years of education: N/A     Occupational History   • Not on file.     Social History Main Topics   • Smoking status: Former Smoker     Packs/day: 1.00     Years: 20.00     Types: Cigarettes     Quit date: 5/12/2018   • Smokeless tobacco: Never Used      Comment: since 1992, trying to quit   • Alcohol use No   • Drug use: No   • Sexual  activity: Not on file     Other Topics Concern   • Not on file     Social History Narrative    From [place]       Family History   Problem Relation Age of Onset   • No Known Problems Mother    • Cancer Sister        Current Outpatient Prescriptions on File Prior to Visit   Medication Sig Dispense Refill   • Loratadine 10 MG Cap Take 1 Tab by mouth 1 time daily as needed (allergy).     • Fluticasone Propionate, Inhal, 50 MCG/BLIST AEROSOL POWDER, BREATH ACTIVATED Spray 1 Spray in nose 1 time daily as needed (allergy).     • non-formulary med Spray 2 L/min in nose Continuous.     • aripiprazole (ABILIFY) 30 MG tablet Take 30 mg by mouth every morning.     • lithium (ESKALITH) 300 MG Tab Take 300-600 mg by mouth 2 Times a Day. 300 mg in the morning and 600 mg at bedtime      • doxepin (SINEQUAN) 25 MG Cap Take 25 mg by mouth every bedtime.     • benztropine (COGENTIN) 1 MG Tab Take 1 mg by mouth every morning.     • doxycycline (VIBRAMYCIN) 100 MG Tab Take 100 mg by mouth every day.     • ibuprofen (MOTRIN) 800 MG Tab Take 800 mg by mouth 1 time daily as needed (for pain).     • Fluticasone Furoate-Vilanterol (BREO ELLIPTA) 100-25 MCG/INH AEROSOL POWDER, BREATH ACTIVATED Inhale 1 Puff by mouth every day.     • nystatin (MYCOSTATIN) powder Apply 1 g to affected area(s) 2 Times a Day.     • albuterol 108 (90 Base) MCG/ACT Aero Soln inhalation aerosol Inhale 2 Puffs by mouth every 6 hours as needed for Shortness of Breath.       No current facility-administered medications on file prior to visit.        Allergies: Amoxicillin    ROS:   Constitutional: Denies fevers, chills, night sweats, +fatigue, denies weight loss  Eyes: Denies vision loss, pain, drainage, double vision  Ears, Nose, Throat: Denies earache, difficulty hearing, tinnitus, nasal congestion, hoarseness  Cardiovascular: Denies chest pain, tightness, palpitations, orthopnea or edema  Respiratory: As in HPI  Sleep: As in HPI  GI: Denies heartburn, dysphagia,  "nausea, abdominal pain, diarrhea or constipation  : Denies frequent urination, hematuria, discharge or painful urination  Musculoskeletal: Denies back pain, painful joints, sore muscles  Neurological: Denies weakness or headaches  Skin: No rashes    Blood pressure 126/70, pulse 61, resp. rate 16, height 1.549 m (5' 1\"), weight (!) 134.7 kg (297 lb), SpO2 90 %, not currently breastfeeding.    Physical Exam:  Appearance: Well-nourished, well-developed, in no acute distress  HEENT: Normocephalic, atraumatic, white sclera, PERRLA, Mallampati 4  Neck: No adenopathy or masses  Respiratory: no intercostal retractions or accessory muscle use  Lungs auscultation: Clear to auscultation bilaterally  Cardiovascular: Regular rate rhythm. No murmurs, rubs or gallops.  1+ LE edema  Abdomen: soft, obese  Gait: Normal  Digits: No clubbing, cyanosis  Motor: No focal deficits  Orientation: Oriented to time, person and place    Diagnosis:  1. KATHIA (obstructive sleep apnea)  zolpidem (AMBIEN) 5 MG Tab    Polysomnography Titration   2. Chronic respiratory failure with hypoxia (HCC)     3. BMI 50.0-59.9, adult ([facility])     4. Pulmonary hypertension (HCC)     5. Chronic obstructive pulmonary disease, unspecified COPD type ([facility])         Plan:  The patient has obesity hypoventilation syndrome, with severe sleep apnea and nocturnal desaturations.  Her AHI is 130/h.  She failed CPAP and BiPAP therapy- recommend titration on IVAPs.   Continue oxygen at 2 L/min with exertion and nocturnally.  Avoid smoking as well as secondhand smoke exposure.  Use Breo 100ug 1 puff QD, with albuterol HFA 1-2 puffs every 4 hours as needed.  Patient's body mass index is 56.12 kg/m². Exercise and nutrition counseling were performed at this visit.  Return for after sleep study.      "

## 2019-02-24 ENCOUNTER — SLEEP STUDY (OUTPATIENT)
Dept: SLEEP MEDICINE | Facility: MEDICAL CENTER | Age: 44
End: 2019-02-24
Attending: INTERNAL MEDICINE
Payer: MEDICAID

## 2019-02-24 DIAGNOSIS — G47.33 OSA (OBSTRUCTIVE SLEEP APNEA): ICD-10-CM

## 2019-02-24 PROCEDURE — 95811 POLYSOM 6/>YRS CPAP 4/> PARM: CPT | Performed by: INTERNAL MEDICINE

## 2019-02-25 NOTE — PROCEDURES
The patient underwent an overnight AVAPS titration using the standard montage for measurement of parameters of sleep, respiratory events, movement abnormalities, and heart rate and rhythm.    A microphone was used to monitor snoring.  Interpretation:  Study start time was 08:13:44 PM.  Diagnostic recording time was 9h 55.0m with a total sleep time of 7h 21.5m resulting in a sleep efficiency of 74.20%%.    Sleep latency from the start of the study was 21 minutes and the latency from sleep to REM was 151 minutes.  In total,137  arousals were scored for an arousal index of 18.6.  Respiratory:  There were a total of 12 apneas consisting of 0 obstructive apneas, 0 mixed apneas, and 12 central apneas.  A total of 560 hypopneas were scored.  The apnea index was 1.63 per hour and the hypopnea index was 76.10 per hour resulting in an overall AHI of 77.73.  AHI during rem was 86.7 and AHI while supine was 81.03.  Oximetry:  There was a mean oxygen saturation of 85.0% with a minimum oxygen saturation of 69.0%.  Time spent with oxygen saturations below 89% was 454.9 minutes.  Cardiac:  The highest heart rate seen while awake was 89 BPM while the highest heart rate during sleep was 70 BPM with an average sleeping heart rate of 57 BPM.  Limb Movements:  There were a total of 0 PLMs during sleep, of which 0 were PLMS arousals.  This resulted in a PLMS index of 0.0 and a PLMS arousal index of 0.0.     AVAPS was tried from  EPAP 4cm, Min IPAP 8cm, Max IPAP 25cm, volume 440ml, and a breath rate of 10BPM to  EPAP 11cm, Min IPAP 15cm, Max IPAP 24cm, volume 450ml, and a breath rate of 14BPM    RECORDING TECHNIQUE:       After the scalp was prepared, gold plated electrodes were applied to the scalp according to the International 10-20 System. EEG (electroencephalogram) was continuously monitored from the O1-M2, O2-M1, C3-M2, C4-M1, F3-M2, and F4-M1.   EOGs (electrooculograms) were monitored by electrodes placed at the left and right  outer canthi.  Chin EMG (electromyogram) was monitored by electrodes placed on the mentalis and sub-mentalis muscles.  Nasal and oral airflow were monitored using a triple port thermocouple as well as oronasal pressure transducer.  Respiratory effort was measured by inductive plethysmography technology employing abdominal and thoracic belts.  Blood oxygen saturation and pulse were monitored by pulse oximetry.  Heart rhythm was monitored by surface electrocardiogram.  Leg EMG was studied using surface electrodes placed on left and right anterior tibialis.  A microphone was used to monitor tracheal sounds and snoring.  Body position was monitored and documented by technician observation      SUMMARY:    This was an overnight positive airway pressure titration polysomnogram.  The patient chose to use a small air fit F 20 mask and heated humidification.     The total recording time was 595 minutes, the sleep period time was 573 minutes, and the total sleep time was 441 minutes.  The patient's sleep efficiency was 74.2 % which is reduced.  The patient experienced a normal 3 REM period(s).    The sleep stage durations revealed 153.5 minutes of wake after sleep onset (WASO), 39.5 minutes of N1 sleep, 265.5 minutes of N2 sleep, 55.5 minutes of N3 sleep, and 81 minutes of REM.    The latency to sleep was 21 minutes which is fairly normal.  The latency to REM was 151.5 minutes which is prolonged.  Mild sleep fragmentation occurred.  The arousal index was 18.6.  The Total Limb Movement (isolated) Index was 5.8, the Limb Movement with Arousal Index was 1.0, and the PLM Series Index was 0.    The patient experienced 81 central and 479 obstructive hypopneas, 12 central apneas and 0 RERAS.  The apnea hypopnea index was 77.7, the RDI was 77.7, the mean event duration was 14.7 seconds, and the longest event lasted 40.9 seconds.  The REM index was 15.9 and the supine index was 71.2.  The apnea hypopnea index represents severe  obstructive sleep apnea hypopnea syndrome during the BiPAP AVAPS titration.    The marina saturation during sleep was 74 % and the patient spent 94.8 % of the recording with saturations less than or equal to 90%.    During sleep, the minimum heart rate was 49 bpm, the mean heart rate was 57 bpm, and the maximum heart rate was 70 bpm.    The technician initiated treatment with AVAPS: EPAP 4, IPAP min 8, IPAP max 25, breathing rate 10, tidal volume 440 mL.  The patient was titrated over a wide range of pressures.  The final pressures were: EPAP 11, IPAP min 15, IPAP max 24, volume 450, breathing rate 14.  Unfortunately the apnea hypopnea index did not normalize on any tested pressure.    ASSESSMENT:    Ineffective AVAPS titration.      RECOMMENDATION:    No recommendations can be extrapolated from the study results.  Clinical correlation is needed.

## 2019-03-01 ENCOUNTER — SLEEP CENTER VISIT (OUTPATIENT)
Dept: SLEEP MEDICINE | Facility: MEDICAL CENTER | Age: 44
End: 2019-03-01
Payer: MEDICAID

## 2019-03-01 VITALS
HEIGHT: 61 IN | DIASTOLIC BLOOD PRESSURE: 70 MMHG | OXYGEN SATURATION: 91 % | BODY MASS INDEX: 52.43 KG/M2 | TEMPERATURE: 97.7 F | WEIGHT: 277.7 LBS | SYSTOLIC BLOOD PRESSURE: 127 MMHG | RESPIRATION RATE: 16 BRPM | HEART RATE: 79 BPM

## 2019-03-01 DIAGNOSIS — G47.33 OSA AND COPD OVERLAP SYNDROME (HCC): ICD-10-CM

## 2019-03-01 DIAGNOSIS — I27.20 PULMONARY HYPERTENSION (HCC): ICD-10-CM

## 2019-03-01 DIAGNOSIS — J96.11 CHRONIC RESPIRATORY FAILURE WITH HYPOXIA (HCC): ICD-10-CM

## 2019-03-01 DIAGNOSIS — J44.9 OSA AND COPD OVERLAP SYNDROME (HCC): ICD-10-CM

## 2019-03-01 PROCEDURE — 99213 OFFICE O/P EST LOW 20 MIN: CPT | Performed by: INTERNAL MEDICINE

## 2019-03-01 RX ORDER — LITHIUM CARBONATE 300 MG/1
CAPSULE ORAL
COMMUNITY
Start: 2019-02-21 | End: 2019-11-17

## 2019-03-01 NOTE — PROGRESS NOTES
Chief Complaint   Patient presents with   • Results     SS Results     HPI: This patient is a 43 y.o. Female who returns for sleep apnea.  She moved from [place], [place] over the past months and was hospitalized for acute hypoxemic respiratory failure, requiring BiPAP therapy, attributed to obesity hypoventilation.  She allegedly had a sleep study in [place] however was never set up on CPAP.  She was discharged from the hospital on supplemental oxygen at 3 L/min and BiPAP: 12/8 cm of water.  She had been a smoker up until the past 6 months and carries a diagnosis of COPD.  Pulmonary function testing showed restrictive physiology with total lung capacity: 77%, FVC of 42% with bronchodilator response.  Her diffusion capacity was 87%. She lives with smokers, however they smoke outside the household.  She has been prescribed inhalers (Breo, Albuterol), which she uses prn.  Her BMI is >54.  Echocardiography showed normal left ventricular systolic function with RVSP estimated at 60 mmHg.  Chest x-ray showed cardiomegaly with pulmonary edema.  In laboratory polysomnography showed very severe KATHIA with  events per hour associated with desaturations to a marina of 79%.  She failed CPAP and BiPAP titration.  Her BiPAP machine was revoked.  She consequently underwent AVAPS titration which was a challenging titration, with final pressure CPAP: 11 cm of water, IPAP: 24/15 cm of water, volume: 450 mL, rate of 14.      Past Medical History:   Diagnosis Date   • Asthma    • Bipolar 2 disorder (HCC)    • Chickenpox    • Other psychological stress    • Schizophrenic disorder (HCC)        Social History     Social History   • Marital status: Single     Spouse name: N/A   • Number of children: N/A   • Years of education: N/A     Occupational History   • Not on file.     Social History Main Topics   • Smoking status: Former Smoker     Packs/day: 1.00     Years: 20.00     Types: Cigarettes     Quit date: 5/12/2018   • Smokeless tobacco:  Never Used      Comment: since 1992, trying to quit   • Alcohol use No   • Drug use: No   • Sexual activity: Not on file     Other Topics Concern   • Not on file     Social History Narrative    From [place]       Family History   Problem Relation Age of Onset   • No Known Problems Mother    • Cancer Sister        Current Outpatient Prescriptions on File Prior to Visit   Medication Sig Dispense Refill   • Loratadine 10 MG Cap Take 1 Tab by mouth 1 time daily as needed (allergy).     • Fluticasone Propionate, Inhal, 50 MCG/BLIST AEROSOL POWDER, BREATH ACTIVATED Spray 1 Spray in nose 1 time daily as needed (allergy).     • non-formulary med Spray 2 L/min in nose Continuous.     • nystatin (MYCOSTATIN) powder Apply 1 g to affected area(s) 2 Times a Day.     • aripiprazole (ABILIFY) 30 MG tablet Take 30 mg by mouth every morning.     • lithium (ESKALITH) 300 MG Tab Take 300-600 mg by mouth 2 Times a Day. 300 mg in the morning and 600 mg at bedtime      • doxepin (SINEQUAN) 25 MG Cap Take 25 mg by mouth every bedtime.     • benztropine (COGENTIN) 1 MG Tab Take 1 mg by mouth every morning.     • doxycycline (VIBRAMYCIN) 100 MG Tab Take 100 mg by mouth every day.     • ibuprofen (MOTRIN) 800 MG Tab Take 800 mg by mouth 1 time daily as needed (for pain).     • Fluticasone Furoate-Vilanterol (BREO ELLIPTA) 100-25 MCG/INH AEROSOL POWDER, BREATH ACTIVATED Inhale 1 Puff by mouth every day.     • albuterol 108 (90 Base) MCG/ACT Aero Soln inhalation aerosol Inhale 2 Puffs by mouth every 6 hours as needed for Shortness of Breath.       No current facility-administered medications on file prior to visit.        Allergies: Amoxicillin    ROS:   Constitutional: Denies fevers, chills, night sweats, fatigue or weight loss  Eyes: Denies vision loss, pain, drainage, double vision  Ears, Nose, Throat: Denies earache, difficulty hearing, tinnitus, nasal congestion, hoarseness  Cardiovascular: Denies chest pain, tightness, palpitations,  "orthopnea or edema  Respiratory: As in HPI  Sleep: +daytime sleepiness, snoring, apneas, insomnia, morning headaches  GI: Denies heartburn, dysphagia, nausea, abdominal pain, diarrhea or constipation  : +frequent urination, denies hematuria, discharge or painful urination  Musculoskeletal: Denies back pain, painful joints, sore muscles  Neurological: Denies weakness or headaches  Skin: No rashes    Blood pressure 127/70, pulse 79, temperature 36.5 °C (97.7 °F), temperature source Temporal, resp. rate 16, height 1.549 m (5' 1\"), weight (!) 126 kg (277 lb 11.2 oz), SpO2 91 %, not currently breastfeeding.    Physical Exam:  Appearance: Well-nourished, well-developed, in no acute distress  HEENT: Normocephalic, atraumatic, white sclera, Mallampati 4  Neck: No masses  Respiratory: no intercostal retractions or accessory muscle use  Lungs auscultation: No audible wheezing  Cardiovascular: 1+ LE edema  Abdomen: Obese  Gait: Normal  Digits: No clubbing, cyanosis  Motor: No focal deficits  Orientation: Oriented to time, person and place    Diagnosis:  1. KATHIA and COPD overlap syndrome (HCC)     2. Chronic respiratory failure with hypoxia (HCC)     3. Pulmonary hypertension (HCC)     4.      Restrictive ventilatory defect secondary to obesity  5.      BMI:52      Plan:  The patient has very severe obstructive sleep apnea, AHI: 130 associated with severe hypoxia.  She failed CPAP and BiPAP titration.  Recommend AVAPS at above pressure setting using a small AirFit F20 mask with heated humidification and oxygen bleed in at 3 L/min, and allow acclamation over a period of 6 weeks, at which time we will reassess her response to treatment.  Further titration may be necessitated in the sleep laboratory following acclamation.  Patient's body mass index is 52.47 kg/m². Exercise and nutrition counseling were performed at this visit.  Return in about 8 weeks (around 4/26/2019).      "

## 2019-06-22 ENCOUNTER — HOSPITAL ENCOUNTER (EMERGENCY)
Facility: MEDICAL CENTER | Age: 44
End: 2019-06-22
Attending: EMERGENCY MEDICINE
Payer: MEDICAID

## 2019-06-22 VITALS
DIASTOLIC BLOOD PRESSURE: 86 MMHG | RESPIRATION RATE: 18 BRPM | WEIGHT: 278.22 LBS | BODY MASS INDEX: 51.2 KG/M2 | TEMPERATURE: 98.6 F | OXYGEN SATURATION: 95 % | HEART RATE: 79 BPM | HEIGHT: 62 IN | SYSTOLIC BLOOD PRESSURE: 145 MMHG

## 2019-06-22 DIAGNOSIS — L03.115 CELLULITIS OF LEG WITHOUT FOOT, RIGHT: ICD-10-CM

## 2019-06-22 PROCEDURE — 99282 EMERGENCY DEPT VISIT SF MDM: CPT

## 2019-06-22 RX ORDER — DOXYCYCLINE 100 MG/1
100 CAPSULE ORAL 2 TIMES DAILY
Qty: 20 CAP | Refills: 0 | Status: SHIPPED | OUTPATIENT
Start: 2019-06-22 | End: 2019-07-02

## 2019-06-22 ASSESSMENT — PAIN SCALES - WONG BAKER: WONGBAKER_NUMERICALRESPONSE: HURTS A WHOLE LOT

## 2019-06-23 NOTE — ED PROVIDER NOTES
ED Provider Note    Scribed for José Miguel Stanton M.D. by Elkin Knapp. 6/22/2019, 7:04 PM.    Primary care provider: Pola Stein M.D.  Means of arrival: Walk In  History obtained from: Patient  History limited by: None    CHIEF COMPLAINT  Chief Complaint   Patient presents with   • Leg Pain     ambulates to triage c/o pain/redness/warm to touch in bilateral lower anterior extremities x 1 week. states has hx of intermittent cellulitis/infection for over a year. states now her lower extremities starting to get infected again.       HPI  Lorene Haro is a 43 y.o. female who presents to the Emergency Department for evaluation of constant redness to her bilateral lower legs onset a week and a half ago. She reports that the redness has been isolated to her lower leg and has not spread.  The patient endorses experiencing associated pain to her lower legs and swelling to both of her feet. She states that her pain is exacerbated with palpation. Per patient, she has had similar redness to her legs approximately a year ago and her doctor at that time told her that it was an infection and that it would likely come back in a year. She does not have a history of diabetes and is unsure of her cardiac history, however, she states that she does use a CPAP machine at night. She is allergic to amoxicillin. The patient denies experiencing fever, chills, nausea, vomiting, shortness of breath, and chest pain.     REVIEW OF SYSTEMS  Pertinent positives include bilateral leg redness, bilateral leg pain, bilateral feet swelling. Pertinent negatives include no fever, chills, nausea, vomiting, and shortness of breath.    PAST MEDICAL HISTORY   has a past medical history of Asthma; Bipolar 2 disorder (HCC); Chickenpox; Other psychological stress; and Schizophrenic disorder ([facility]).    SURGICAL HISTORY   has a past surgical history that includes hysterectomy laparoscopy; colectomy; knee arthroscopy; and  "cholecystectomy.    SOCIAL HISTORY  Social History   Substance Use Topics   • Smoking status: Former Smoker     Packs/day: 1.00     Years: 20.00     Types: Cigarettes     Quit date: 5/12/2018   • Smokeless tobacco: Never Used      Comment: since 1992, trying to quit   • Alcohol use No      History   Drug Use No       FAMILY HISTORY  Family History   Problem Relation Age of Onset   • No Known Problems Mother    • Cancer Sister        CURRENT MEDICATIONS  Home Medications    **Home medications have not yet been reviewed for this encounter**         ALLERGIES  Allergies   Allergen Reactions   • Amoxicillin Rash       PHYSICAL EXAM  VITAL SIGNS: /86   Pulse 79   Temp 37 °C (98.6 °F) (Temporal)   Resp 18   Ht 1.575 m (5' 2\")   Wt (!) 126.2 kg (278 lb 3.5 oz)   LMP  (LMP Unknown)   SpO2 95%   BMI 50.89 kg/m²     Cardiovascular: Normal heart rate, Normal rhythm, No murmurs, equal pulses.   Pulmonary: Normal breath sounds, No respiratory distress, No wheezing, No rales, No rhonchi.  Musculoskeletal: No major deformities noted, No tenderness. No calf pain or tenderness. Trace edema from the mid calf down bilaterally.   Skin: Warm, Dry. Chronic scaling with a psoriasis type rash over the anterior right shin. 4 cm by 12 cm area of erythema to the right shin that is slightly warm to touch, no discharge. The right shin is more erythematous and warm than the left, the left also has some chronic venous stasis changes but does not look infected.     COURSE & MEDICAL DECISION MAKING  Pertinent Labs & Imaging studies reviewed. (See chart for details)    7:08 PM - Patient seen and examined at bedside. I informed the patient that she appears to have an infection in her legs and I told her that we will send her home with a prescription for doxycycline. She was stable for discharge at this time. I advised her to follow up with her doctor and to return to the ED if any new or worsening symptoms arise. She understood and " agreed to the plan of care including discharge.     Medical Decision Making: At this point, think the patient may have some underlying psoriasis which now appears to have a secondary infection today.  There is no fluctuance to the area do not think the patient has an abscess.  We will start the patient on antibiotics and have him follow-up with primary for that there chronic skin changes in the legs.    The patient will return for new or worsening symptoms and is stable at the time of discharge.    The patient is referred to a primary physician for blood pressure management, diabetic screening, and for all other preventative health concerns.    DISPOSITION:  Patient will be discharged home in stable condition.    FOLLOW UP:  Pola Stein M.D.  [address] [facility] [id number] [phone]    Schedule an appointment as soon as possible for a visit in 3 days  For wound re-check      OUTPATIENT MEDICATIONS:  Discharge Medication List as of 6/22/2019  7:17 PM      START taking these medications    Details   doxycycline (MONODOX) 100 MG capsule Take 1 Cap by mouth 2 times a day for 10 days., Disp-20 Cap, R-0, Print Rx Paper              FINAL IMPRESSION  1. Cellulitis of leg without foot, right          Elkin HARRIS (Scribe), am scribing for, and in the presence of, José Miguel Stanton M.D.    Electronically signed by: Elkin Knapp ([employer]), 6/22/2019    José Miguel HARRIS M.D. personally performed the services described in this documentation, as scribed by Elkin Knapp in my presence, and it is both accurate and complete. E.     The note accurately reflects work and decisions made by me.  José Miguel Stanton  6/23/2019  4:30 AM

## 2019-06-23 NOTE — ED TRIAGE NOTES
Chief Complaint   Patient presents with   • Leg Pain     ambulates to triage c/o pain/redness/warm to touch in bilateral lower anterior extremities x 1 week. states has hx of intermittent cellulitis/infection for over a year. states now her lower extremities starting to get infected again.     Educated on triage process. Instructed to notify staff for any worsening symptoms. VSS.

## 2019-07-22 ENCOUNTER — SLEEP CENTER VISIT (OUTPATIENT)
Dept: SLEEP MEDICINE | Facility: MEDICAL CENTER | Age: 44
End: 2019-07-22
Payer: MEDICAID

## 2019-07-22 VITALS
DIASTOLIC BLOOD PRESSURE: 74 MMHG | OXYGEN SATURATION: 91 % | HEART RATE: 79 BPM | SYSTOLIC BLOOD PRESSURE: 124 MMHG | RESPIRATION RATE: 15 BRPM | BODY MASS INDEX: 51.89 KG/M2 | HEIGHT: 62 IN | WEIGHT: 282 LBS

## 2019-07-22 DIAGNOSIS — G47.33 OSA AND COPD OVERLAP SYNDROME (HCC): ICD-10-CM

## 2019-07-22 DIAGNOSIS — J44.9 OSA AND COPD OVERLAP SYNDROME (HCC): ICD-10-CM

## 2019-07-22 PROCEDURE — 99213 OFFICE O/P EST LOW 20 MIN: CPT | Performed by: FAMILY MEDICINE

## 2019-07-22 NOTE — PROGRESS NOTES
"   [facility] Follow Up Note     Date: 7/22/2019 / Time: 2:36 PM    Patient ID:   Name:             Lorene Haro   YOB: 1975  Age:                 43 y.o.  female   MRN:               [id number]      Thank you for requesting a sleep medicine consultation on Lorene Haro at the sleep center. She presents today with the chief complaints of KATHIA follow up. Her comeorbid condition include COPD and Bipolar disorder.    HISTORY OF PRESENT ILLNESS:       Pt is currently on AVAP IPAP: 11 cm of water, IPAP: 24/15 cm of water, volume: 450 mL, rate of 14 with O2 bleed in at 2L/min. She goes to sleep around 9-10 pm and wakes up around 3-4 am. She is getting about 5 hrs of sleep on a good night and about 4 hr of sleep on a bad night.  Overall,  She does finds her sleep refreshing since she has been on the AVAps but still getting 4-5/hrs of sleep. She does take regular naps. The naps are usually 60-12 min long.    She is using AVAP most days of the week. Pt reports 4 hrs of average nightly use of AVAP. Pt denies snoring, gasping,choking.Pt also denies significant mask leak that is interfering with sleep. The 30 day compliance was downloaded which shows inadequate compliance with more that 4 hr usage about 63%. The AHI is has improved to 1.5 to 3.8/hr. The mask leak is  abnormal of 37 min. The symptoms of excessive daytime sleepiness  improved.     She has COPD, the PFT showed   Pulmonary function testing showed restrictive physiology with total lung capacity: 77%, FVC of 42% with bronchodilator response.  Her diffusion capacity was 87%. She has a long standing hx of smoking but quit in 2018. She is currently on Breo 100ug 1 puff QD, with albuterol HFA 1-2 puffs every 4 hours as needed.      SLEEP HISTORY   12/8/18\" SPlit night: Very severe obstructive sleep apnea hypopnea - .1  Severe nocturnal desaturation - marina saturation 79 % - saturations <90% for 50.6% of the diagnostic " recording. initiated treatment with CPAPset at 5 cmH2O and increased the pressure to a maximum of 12 cmH2O. bilevel was also tried at multiple pressures.The patient failed to normalize the apnea hypopnea index on any tested pressure and on most occasions the apnea hypotony index actually increased significantly.    AVAPS titration: initiated treatment with AVAPS: EPAP 4, IPAP min 8, IPAP max 25, breathing rate 10, tidal volume 440 mL.  The patient was titrated over a wide range of pressures.  The final pressures were: EPAP 11, IPAP min 15, IPAP max 24, volume 450, breathing rate 14.    REVIEW OF SYSTEMS:       Constitutional: Denies fevers, Denies weight changes  Eyes: Denies changes in vision, no eye pain  Ears/Nose/Throat/Mouth: Denies nasal congestion or sore throat   Cardiovascular: Denies chest pain or palpitations   Respiratory: Denies shortness of breath , Denies cough  Gastrointestinal/Hepatic: Denies abdominal pain, nausea, vomiting, diarrhea, constipation or GI bleeding   Genitourinary: Deniesdysuria or frequency  Musculoskeletal/Rheum: Denies  joint pain and swelling   Skin/Breast: Denies rash,   Neurological: Denies headache, confusion, memory loss or focal weakness/parasthesias  Psychiatric: denies mood disorder   Sleep: improved EDS    Comprehensive review of systems form is reviewed with the patient and is attached in the EMR.     PMH:  has a past medical history of Asthma; Bipolar 2 disorder (HCC); Chickenpox; Other psychological stress; and Schizophrenic disorder ([facility]). She also has no past medical history of CAD (coronary artery disease); Cancer ([facility]); Congestive heart failure (HCC); COPD; Diabetes; Hypertension; Infectious disease; Liver disease; Renal disorder; Seizure disorder (HCC); or Stroke ([facility]).  MEDS:   Current Outpatient Prescriptions:   •  lithium carbonate 300 MG capsule, , Disp: , Rfl:   •  Loratadine 10 MG Cap, Take 1 Tab by mouth 1 time daily as needed (allergy)., Disp: , Rfl:   •   Fluticasone Propionate, Inhal, 50 MCG/BLIST AEROSOL POWDER, BREATH ACTIVATED, Spray 1 Spray in nose 1 time daily as needed (allergy)., Disp: , Rfl:   •  non-formulary med, Spray 2 L/min in nose Continuous., Disp: , Rfl:   •  nystatin (MYCOSTATIN) powder, Apply 1 g to affected area(s) 2 Times a Day., Disp: , Rfl:   •  aripiprazole (ABILIFY) 30 MG tablet, Take 30 mg by mouth every morning., Disp: , Rfl:   •  lithium (ESKALITH) 300 MG Tab, Take 300-600 mg by mouth 2 Times a Day. 300 mg in the morning and 600 mg at bedtime , Disp: , Rfl:   •  doxepin (SINEQUAN) 25 MG Cap, Take 25 mg by mouth every bedtime., Disp: , Rfl:   •  benztropine (COGENTIN) 1 MG Tab, Take 1 mg by mouth every morning., Disp: , Rfl:   •  doxycycline (VIBRAMYCIN) 100 MG Tab, Take 100 mg by mouth every day., Disp: , Rfl:   •  ibuprofen (MOTRIN) 800 MG Tab, Take 800 mg by mouth 1 time daily as needed (for pain)., Disp: , Rfl:   •  Fluticasone Furoate-Vilanterol (BREO ELLIPTA) 100-25 MCG/INH AEROSOL POWDER, BREATH ACTIVATED, Inhale 1 Puff by mouth every day., Disp: , Rfl:   •  albuterol 108 (90 Base) MCG/ACT Aero Soln inhalation aerosol, Inhale 2 Puffs by mouth every 6 hours as needed for Shortness of Breath., Disp: , Rfl:   ALLERGIES:   Allergies   Allergen Reactions   • Amoxicillin Rash     SURGHX:   Past Surgical History:   Procedure Laterality Date   • CHOLECYSTECTOMY     • COLECTOMY     • HYSTERECTOMY LAPAROSCOPY     • KNEE ARTHROSCOPY       SOCHX:  reports that she quit smoking about 14 months ago. Her smoking use included Cigarettes. She has a 20.00 pack-year smoking history. She has never used smokeless tobacco. She reports that she does not drink alcohol or use drugs..  FH:   Family History   Problem Relation Age of Onset   • No Known Problems Mother    • Cancer Sister          Physical Exam:  Vitals/ General Appearance:   Weight/BMI: Body mass index is 51.58 kg/m².  /74 (BP Location: Right arm, Patient Position: Sitting, BP Cuff Size:  "Adult)   Pulse 79   Resp 15   Ht 1.575 m (5' 2\")   Wt (!) 127.9 kg (282 lb)   SpO2 91%   Vitals:    07/22/19 1429   BP: 124/74   BP Location: Right arm   Patient Position: Sitting   BP Cuff Size: Adult   Pulse: 79   Resp: 15   SpO2: 91%   Weight: (!) 127.9 kg (282 lb)   Height: 1.575 m (5' 2\")       Pt. is alert and oriented to time, place and person. Cooperative and in no apparent distress.       -Head: Atraumatic, normocephalic.   -. Ears: Normal tympanic membrane and no discharge  -. Nose: No inferior turbinate hypertophy  -. Throat: Oropharynx appears croded in that the palate is overhanging  -. Neck: Supple. No thyromegaly  -. Chest: Trachea central, no spine deformity   -. Lungs auscultation: B/L good air entry, vesicular breath sounds, no adventitious sounds  -. Heart auscultation: 1st and 2nd heart sounds normal, regular rhythm. No appreciable murmur.  - Extremities: no clubbing, no pedal edema.  - Skin: No rash  - NEUROLOGICAL EXAMINATION: On neurological exam, the patient was alert and oriented x3. speech was clear and fluent without dysarthria.      ASSESSMENT AND PLAN     1.obstructive Sleep Apnea      The pathophysiology of sleep anea and the increased risk of cardiovascular morbidity from untreated sleep apnea is discussed in detail with the patient.    She is urged to avoid supine sleep, weight gain and alcoholic beverages since all of these can worsen sleep apnea. She is cautioned against drowsy driving. If She feels sleepy while driving, She must pull over for a break/nap, rather than persist on the road, in the interest of She own safety and that of others on the road.   Plan   - Continue AVAP IPAP: 11 cm of water, IPAP: 24/15 cm of water, volume: 450 mL, rate of 14 with O2 bleed in at 2L/min   - OPO ordered today    - compliance download was reviewed and discussed with the pt   - compliance was reinforced     2. Regarding treatment of other past medical problems and general health " maintenance,  She is urged to follow up with PCP.

## 2019-08-01 ENCOUNTER — APPOINTMENT (OUTPATIENT)
Dept: SLEEP MEDICINE | Facility: MEDICAL CENTER | Age: 44
End: 2019-08-01
Attending: FAMILY MEDICINE
Payer: MEDICAID

## 2019-08-01 DIAGNOSIS — J44.9 OSA AND COPD OVERLAP SYNDROME (HCC): ICD-10-CM

## 2019-08-01 DIAGNOSIS — G47.33 OSA AND COPD OVERLAP SYNDROME (HCC): ICD-10-CM

## 2019-08-08 ENCOUNTER — HOME STUDY (OUTPATIENT)
Dept: SLEEP MEDICINE | Facility: MEDICAL CENTER | Age: 44
End: 2019-08-08
Attending: FAMILY MEDICINE
Payer: MEDICAID

## 2019-08-08 PROCEDURE — 94762 N-INVAS EAR/PLS OXIMTRY CONT: CPT | Performed by: FAMILY MEDICINE

## 2019-08-12 NOTE — PROCEDURES
Over Night Pulse Oximetry     Indication:To assess the efficacy of the current pressure.       Impression:   The study was done on AVAPS EPAP 11 cm IPAP 25 cm O2 at 2L/min. The total analyzed time was 2 hrs 32 min. O2 Sat. marina was 88% and mean O2 sat was 90 % and baseline O2 at 92%. O2 sat was below 88% for 0 min of the flow evaluation time. Oxygen Desaturation (>=3%) Index was elevated at 5.1/hr.      Recommendation:  Limited recording time however it was unremarkable OPO. Continue PAP therapy with O2 bleed.  Clinical correlation recommended.

## 2019-08-14 ENCOUNTER — TELEPHONE (OUTPATIENT)
Dept: PULMONOLOGY | Facility: HOSPICE | Age: 44
End: 2019-08-14

## 2019-08-15 ENCOUNTER — APPOINTMENT (OUTPATIENT)
Dept: SLEEP MEDICINE | Facility: MEDICAL CENTER | Age: 44
End: 2019-08-15
Payer: MEDICAID

## 2019-08-22 ENCOUNTER — TELEPHONE (OUTPATIENT)
Dept: SLEEP MEDICINE | Facility: MEDICAL CENTER | Age: 44
End: 2019-08-22

## 2019-08-22 NOTE — TELEPHONE ENCOUNTER
Pt called stating her CPAP is malfunctioning mechanically, I advised her [facility] needs to look at it but she doesn't want to call them because she is non-compliant and they will take her machine. I advised her that she needs an OV as I have told her before so we can re-order the CPAP. Offered to schedule her for 8/23/19 and she hung up.

## 2019-08-23 ENCOUNTER — TELEPHONE (OUTPATIENT)
Dept: SLEEP MEDICINE | Facility: MEDICAL CENTER | Age: 44
End: 2019-08-23

## 2019-08-23 NOTE — TELEPHONE ENCOUNTER
Pts CPAP was taken, I have told her a few times now she needs an apt and she was scheduled to see renate but for some reason she was not seen. I LVM for her to call me back because I have a spot held for her with [facility] on 9/4/19 at 11:20am. Need to document the reason for noncompliance and that she wants to continue therapy and re-order CPAP. From what we have been told by DME no SS needed but worse case she may need to. We can see what happens after we reorder CPAP.    If she calls back please route her to [facility].

## 2019-08-27 NOTE — TELEPHONE ENCOUNTER
Spoke with pt, no apts I offered her worked for her schedule so she is scheduled for 10/09/19, she will call for sooner apt when she can.

## 2019-09-24 ENCOUNTER — HOSPITAL ENCOUNTER (EMERGENCY)
Facility: MEDICAL CENTER | Age: 44
End: 2019-09-24
Attending: EMERGENCY MEDICINE
Payer: MEDICAID

## 2019-09-24 VITALS
SYSTOLIC BLOOD PRESSURE: 153 MMHG | DIASTOLIC BLOOD PRESSURE: 82 MMHG | OXYGEN SATURATION: 95 % | TEMPERATURE: 98.2 F | BODY MASS INDEX: 54.07 KG/M2 | RESPIRATION RATE: 16 BRPM | WEIGHT: 286.38 LBS | HEIGHT: 61 IN | HEART RATE: 74 BPM

## 2019-09-24 DIAGNOSIS — I87.2 VENOUS STASIS DERMATITIS OF BOTH LOWER EXTREMITIES: ICD-10-CM

## 2019-09-24 DIAGNOSIS — L03.115 CELLULITIS OF RIGHT LOWER EXTREMITY: ICD-10-CM

## 2019-09-24 PROCEDURE — 99282 EMERGENCY DEPT VISIT SF MDM: CPT

## 2019-09-24 RX ORDER — DOXYCYCLINE 100 MG/1
100 CAPSULE ORAL 2 TIMES DAILY
Qty: 20 CAP | Refills: 0 | Status: SHIPPED | OUTPATIENT
Start: 2019-09-24 | End: 2019-10-04

## 2019-09-24 NOTE — ED TRIAGE NOTES
Chief Complaint   Patient presents with   • Other     redness of skin bilateral lower extremities     symptoms began 1 week ago.  States has a history of cellulitis.  Triage process explained to patient.  Pt back to waiting room.  Pt instructed to inform RN if any changes or questions arise.

## 2019-09-25 NOTE — ED PROVIDER NOTES
"ED Provider Note    Scribed for Cyndy Taylor M.D. by Hilton Hall. 9/24/2019, 5:34 PM.    Primary care provider: Pola Stein M.D.  Means of arrival: walk-in  History obtained from: patient  History limited by: none    CHIEF COMPLAINT  Chief Complaint   Patient presents with   • Other     redness of skin bilateral lower extremities       HPI  Lorene Haro is a 44 y.o. female who presents to the Emergency Department complaining of bilateral lower extremity erythema onset 1 week ago. The patient reports that she has been diagnosed with cellulitis previously and that she was admitted to a hospital in [place] for 3-4 days and received IV medications. About 1 week ago, the patient reports that she began experiencing bilateral lower extremity erythema. She reports that soon after the erythema onset, she struck her right calf on an object in the shower which exacerbated her right lower extremity erythema. She reports associated bilateral lower extremity edema, bilateral lower extremity pain, and diaphoresis onset 2.5 days ago, but denies any associated numbness or tingling to her extremities, fevers, or chills. The patient also reports that both of her legs \"felt heavy\" and notes that she has experienced recent weight gain. She has a history of bilateral lower extremity pain and erythema from an ED visit on 6/22/2019 and multiple previous visits to the ED. The patient also reports having a visit scheduled to receive Cpap on 10/19 secondary to difficulty sleeping. She quit smoking 2 years ago and denies any recent travels, regular hormonal medications, or blood thinner usage. She also denies any history of diabetes, deep vein thrombosis, or heart diseases.     REVIEW OF SYSTEMS  Pertinent positives include bilateral lower extremity erythema, bilateral lower extremity edema, bilateral lower extremity pain, diaphoresis, weight gain, difficulty sleeping. Pertinent negatives include no bilateral lower " extremity numbness or tingling, fevers, chills.     PAST MEDICAL HISTORY   has a past medical history of Asthma, Bipolar 2 disorder (HCC), Chickenpox, Other psychological stress, and Schizophrenic disorder (HCC).    SURGICAL HISTORY   has a past surgical history that includes hysterectomy laparoscopy; colectomy; knee arthroscopy; and cholecystectomy.    SOCIAL HISTORY  Social History     Tobacco Use   • Smoking status: Former Smoker     Packs/day: 1.00     Years: 20.00     Pack years: 20.00     Types: Cigarettes     Last attempt to quit: 2018     Years since quittin.3   • Smokeless tobacco: Never Used   • Tobacco comment: since , trying to quit   Substance Use Topics   • Alcohol use: No   • Drug use: No      Social History     Substance and Sexual Activity   Drug Use No       FAMILY HISTORY  Family History   Problem Relation Age of Onset   • No Known Problems Mother    • Cancer Sister        CURRENT MEDICATIONS  No current facility-administered medications for this encounter.     Current Outpatient Medications:   •  doxycycline (MONODOX) 100 MG capsule, Take 1 Cap by mouth 2 times a day for 10 days., Disp: 20 Cap, Rfl: 0  •  lithium carbonate 300 MG capsule, , Disp: , Rfl:   •  Loratadine 10 MG Cap, Take 1 Tab by mouth 1 time daily as needed (allergy)., Disp: , Rfl:   •  Fluticasone Propionate, Inhal, 50 MCG/BLIST AEROSOL POWDER, BREATH ACTIVATED, Spray 1 Spray in nose 1 time daily as needed (allergy)., Disp: , Rfl:   •  non-formulary med, Spray 2 L/min in nose Continuous., Disp: , Rfl:   •  nystatin (MYCOSTATIN) powder, Apply 1 g to affected area(s) 2 Times a Day., Disp: , Rfl:   •  aripiprazole (ABILIFY) 30 MG tablet, Take 30 mg by mouth every morning., Disp: , Rfl:   •  lithium (ESKALITH) 300 MG Tab, Take 300-600 mg by mouth 2 Times a Day. 300 mg in the morning and 600 mg at bedtime , Disp: , Rfl:   •  doxepin (SINEQUAN) 25 MG Cap, Take 25 mg by mouth every bedtime., Disp: , Rfl:   •  benztropine  "(COGENTIN) 1 MG Tab, Take 1 mg by mouth every morning., Disp: , Rfl:   •  doxycycline (VIBRAMYCIN) 100 MG Tab, Take 100 mg by mouth every day., Disp: , Rfl:   •  ibuprofen (MOTRIN) 800 MG Tab, Take 800 mg by mouth 1 time daily as needed (for pain)., Disp: , Rfl:   •  Fluticasone Furoate-Vilanterol (BREO ELLIPTA) 100-25 MCG/INH AEROSOL POWDER, BREATH ACTIVATED, Inhale 1 Puff by mouth every day., Disp: , Rfl:   •  albuterol 108 (90 Base) MCG/ACT Aero Soln inhalation aerosol, Inhale 2 Puffs by mouth every 6 hours as needed for Shortness of Breath., Disp: , Rfl:     ALLERGIES  Allergies   Allergen Reactions   • Amoxicillin Rash       PHYSICAL EXAM  VITAL SIGNS: /82   Pulse 74   Temp 36.8 °C (98.2 °F) (Temporal)   Resp 16   Ht 1.549 m (5' 1\")   Wt (!) 129.9 kg (286 lb 6 oz)   LMP  (LMP Unknown)   SpO2 95%   BMI 54.11 kg/m²     Constitutional:  Sitting up in bed slightly uncomfortably, able to answer questions  HENT: Nose is normal in appearance without rhinorrhea, external ears are normal,  moist mucous membranes  Eyes: Anicteric,  pupils are equal round and reactive, there is no conjunctival drainage or pallor   Neck: The trachea is midline, there is no obvious mass or meningeal signs  Cardiovascular: Equal radial pulsation, regular rate and rhythm without murmurs gallops or rubs  Thorax & Lungs: Respiratory rate and effort are normal. There is normal chest excursion with respiration. No wheezes rhonchi or rales noted.  Abdomen: Abdomen is normal in appearance, normal bowel sounds, no pain with cough, nonpulsatile  :  No CVA tenderness to palpation  Musculoskeletal: 2+ anterior tibialis pulses bilaterally. Bilateral left greater than right lower extremity edema. 1+ pitting edema. No deformities noted in all 4 extremities. Actively moves all 4 extremities  Skin: 3 cm x 7 cm erythematous region of skin with central less than 1 cm raised scab. Visualized skin is warm.  Neurologic:  Cranial nerves II through " "XII are intact there is no focal abnormality noted.  Psychiatric: Normal mood and mentation      COURSE & MEDICAL DECISION MAKING  Nursing notes and vital signs were reviewed. (See chart for details)      The patient presents with bilateral lower extremity erythema, and the differential diagnosis includes but is not limited to cellulitis, chronic venous stasis good pulse and no evidence of arterial ischemia.  .       5:34 PM - Patient was seen and examined at bedside.    5:56 PM - Patient was reevaluated at bedside. Discussed her history of chronic venous stasis changes. Informed the patient that her symptoms are not of significant concern and that they may be treated with doxycycline 100 mg which she will receive for outpatient medications. Also recommended that she follow up with Dr. Stein, primary care, for further evaluation of her infection in 2 days. She should make a visit sooner, however, if her symptoms worsen. The patient is agreeable and understanding to the plan for discharge and follow up with her primary care physician.    /82   Pulse 74   Temp 36.8 °C (98.2 °F) (Temporal)   Resp 16   Ht 1.549 m (5' 1\")   Wt (!) 129.9 kg (286 lb 6 oz)   LMP  (LMP Unknown)   SpO2 95%   BMI 54.11 kg/m²     The patient will return for new or worsening symptoms and is stable at the time of discharge.    The patient is referred to a primary physician for blood pressure management, diabetic screening, and for all other preventative health concerns.      DISPOSITION:  Patient will be discharged home in stable condition.    FOLLOW UP:  Pola Stein M.D.  [address] [facility] [id number] [phone]    Schedule an appointment as soon as possible for a visit in 2 days  two days for a wound check or sooner if rash or symptoms worsen      OUTPATIENT MEDICATIONS:  New Prescriptions    DOXYCYCLINE (MONODOX) 100 MG CAPSULE    Take 1 Cap by mouth 2 times a day for 10 days.     The patient was discharged home " with an information sheet on cellulitis and told to return immediately for any signs or symptoms listed, but specifically if her rash worsens.  The patient agreed to the discharge precautions and follow-up plan which is documented in EPIC.    FINAL IMPRESSION  1. Cellulitis of right lower extremity    2. Venous stasis dermatitis of both lower extremities          IHilton (Scribe), am scribing for, and in the presence of, Cyndy Taylor M.D..    Electronically signed by: Hilton Hall (Kevonibe), 9/24/2019    I, Cyndy Taylor M.D. personally performed the services described in this documentation, as scribed by Hilton Hall in my presence, and it is both accurate and complete.    E    The note accurately reflects work and decisions made by me.  Cyndy Taylor  9/24/2019  9:00 PM

## 2019-10-09 ENCOUNTER — SLEEP CENTER VISIT (OUTPATIENT)
Dept: SLEEP MEDICINE | Facility: MEDICAL CENTER | Age: 44
End: 2019-10-09
Payer: MEDICAID

## 2019-10-09 VITALS
WEIGHT: 290 LBS | RESPIRATION RATE: 20 BRPM | HEART RATE: 72 BPM | DIASTOLIC BLOOD PRESSURE: 64 MMHG | SYSTOLIC BLOOD PRESSURE: 120 MMHG | OXYGEN SATURATION: 94 % | BODY MASS INDEX: 53.37 KG/M2 | HEIGHT: 62 IN

## 2019-10-09 DIAGNOSIS — J44.9 OSA AND COPD OVERLAP SYNDROME (HCC): ICD-10-CM

## 2019-10-09 DIAGNOSIS — G47.33 OSA AND COPD OVERLAP SYNDROME (HCC): ICD-10-CM

## 2019-10-09 PROCEDURE — 99213 OFFICE O/P EST LOW 20 MIN: CPT | Performed by: INTERNAL MEDICINE

## 2019-10-09 NOTE — PROGRESS NOTES
Chief Complaint   Patient presents with   • Follow-Up     KATHIA, needs CPAP reordered     HPI: This patient is a 43 y.o. Female who returns for sleep apnea.  She moved from [place], [place] and was hospitalized for acute hypoxemic respiratory failure, requiring BiPAP therapy, attributed to obesity hypoventilation.  She had a sleep study in [place] however was never set up on CPAP.  She was discharged from the hospital on supplemental oxygen at 3 L/min and BiPAP: 12/8 cm of water pending sleep study.    She had been a smoker up until the past 6 months and carries a diagnosis of COPD.  Pulmonary function testing showed restrictive physiology with total lung capacity: 77%, FVC of 42% with bronchodilator response.  Her diffusion capacity was 87%.  Her BMI is >54. Echocardiography showed normal left ventricular systolic function with RVSP estimated at 60 mmHg. In laboratory polysomnography showed very severe KATHIA with  events per hour associated with desaturations to a marina of 79%.  She failed CPAP and BiPAP titration. She consequently underwent AVAPS titration with final pressure EPAP: 11 cm of water, IPAP: 24/15 cm of water, volume: 450 mL, rate of 14, and 3 L oxygen bleed in.  At her last compliance visit in July 2019, compliance download showed 63% usage with normal AHI.  Her sleepiness had improved with treatment, however her machine was revoked due to suboptimal compliance.  She had been struggling with recurrent nighttime awakenings and was not aware that she was not meeting 70% compliance , which was reviewed with her today.  She is amenable to resuming treatment.       Past Medical History:   Diagnosis Date   • Asthma    • Bipolar 2 disorder (HCC)    • Chickenpox    • Other psychological stress    • Schizophrenic disorder (HCC)        Social History     Socioeconomic History   • Marital status: Single     Spouse name: Not on file   • Number of children: Not on file   • Years of education: Not on file   • Highest  education level: Not on file   Occupational History   • Not on file   Social Needs   • Financial resource strain: Not on file   • Food insecurity:     Worry: Not on file     Inability: Not on file   • Transportation needs:     Medical: Not on file     Non-medical: Not on file   Tobacco Use   • Smoking status: Former Smoker     Packs/day: 1.00     Years: 20.00     Pack years: 20.00     Types: Cigarettes     Last attempt to quit: 2018     Years since quittin.4   • Smokeless tobacco: Never Used   • Tobacco comment: since , trying to quit   Substance and Sexual Activity   • Alcohol use: No   • Drug use: No   • Sexual activity: Not on file   Lifestyle   • Physical activity:     Days per week: Not on file     Minutes per session: Not on file   • Stress: Not on file   Relationships   • Social connections:     Talks on phone: Not on file     Gets together: Not on file     Attends Gnosticist service: Not on file     Active member of club or organization: Not on file     Attends meetings of clubs or organizations: Not on file     Relationship status: Not on file   • Intimate partner violence:     Fear of current or ex partner: Not on file     Emotionally abused: Not on file     Physically abused: Not on file     Forced sexual activity: Not on file   Other Topics Concern   • Not on file   Social History Narrative    From [place]       Family History   Problem Relation Age of Onset   • No Known Problems Mother    • Cancer Sister        Current Outpatient Medications on File Prior to Visit   Medication Sig Dispense Refill   • Acetaminophen (TYLENOL PO) Take  by mouth.     • lithium carbonate 300 MG capsule      • Loratadine 10 MG Cap Take 1 Tab by mouth 1 time daily as needed (allergy).     • Fluticasone Propionate, Inhal, 50 MCG/BLIST AEROSOL POWDER, BREATH ACTIVATED Spray 1 Spray in nose 1 time daily as needed (allergy).     • non-formulary med Spray 2 L/min in nose Continuous.     • nystatin (MYCOSTATIN) powder  "Apply 1 g to affected area(s) 2 Times a Day.     • aripiprazole (ABILIFY) 30 MG tablet Take 30 mg by mouth every morning.     • lithium (ESKALITH) 300 MG Tab Take 300-600 mg by mouth 2 Times a Day. 300 mg in the morning and 600 mg at bedtime      • Fluticasone Furoate-Vilanterol (BREO ELLIPTA) 100-25 MCG/INH AEROSOL POWDER, BREATH ACTIVATED Inhale 1 Puff by mouth every day.     • albuterol 108 (90 Base) MCG/ACT Aero Soln inhalation aerosol Inhale 2 Puffs by mouth every 6 hours as needed for Shortness of Breath.       No current facility-administered medications on file prior to visit.        Allergies: Amoxicillin    ROS:   Constitutional: Denies fevers, chills, night sweats,+ fatigue, denies weight loss  Eyes: Denies vision loss, pain, drainage, double vision  Ears, Nose, Throat: Denies earache, difficulty hearing, tinnitus, nasal congestion, hoarseness  Cardiovascular: Denies chest pain, tightness, palpitations, orthopnea or edema  Respiratory: +shortness of breath, cough, denies wheezing, hemoptysis  Sleep: +daytime sleepiness, snoring, apneas, insomnia, morning headaches  GI: Denies heartburn, dysphagia, nausea, abdominal pain, diarrhea or constipation  : Denies frequent urination, hematuria, discharge or painful urination  Musculoskeletal: Denies back pain, painful joints, sore muscles  Neurological: Denies weakness or headaches  Skin: No rashes    /64 (BP Location: Left arm, Patient Position: Sitting, BP Cuff Size: Large adult)   Pulse 72   Resp 20   Ht 1.575 m (5' 2\")   Wt (!) 131.5 kg (290 lb)   SpO2 94%     Physical Exam:  Appearance: Well-nourished, well-developed, in no acute distress  HEENT: Normocephalic, atraumatic, white sclera, PERRLA, oropharynx clear  Neck: No adenopathy or masses  Respiratory: no intercostal retractions or accessory muscle use  Lungs auscultation: Clear to auscultation bilaterally  Cardiovascular: Regular rate rhythm. No murmurs, rubs or gallops.  No LE " edema  Abdomen: soft, nondistended  Gait: Normal  Digits: No clubbing, cyanosis  Motor: No focal deficits  Orientation: Oriented to time, person and place    Diagnosis:  1. KATHIA and COPD overlap syndrome (HCC)     2. BMI 50.0-59.9, adult ([facility])         Plan:  The patient was showing good clinical response to AVAPS for treatment of KATHIA and COPD overlap syndrome.  Unfortunately she did not meet compliance and her equipment was revoked.  She now under stands the standards necessary to keep her equipment and is amenable to resuming treatment.  We have reordered her AVAPS at EPAP:11 cm H20, IPAP:15 cm H20, max IPAP:24 cm H20 TV:450ml RR:14 with 3 L oxygen bleed in.  We discussed the importance of at least 70% CPAP usage for minimal 4 hours nightly.  We will download compliance card on follow-up.  Patient's body mass index is 53.04 kg/m². Exercise and nutrition counseling were performed at this visit.  Return in about 3 months (around 1/9/2020).

## 2019-10-10 ENCOUNTER — TELEPHONE (OUTPATIENT)
Dept: SLEEP MEDICINE | Facility: MEDICAL CENTER | Age: 44
End: 2019-10-10

## 2019-10-10 DIAGNOSIS — J44.9 CHRONIC OBSTRUCTIVE PULMONARY DISEASE, UNSPECIFIED COPD TYPE (HCC): ICD-10-CM

## 2019-10-10 NOTE — TELEPHONE ENCOUNTER
"Pt called stating she would like an order for a \" rental poc for when she travels\". The pt informed me that she is traveling in a few days.     I spoke to Ashley about this and she informed me that the pt is using O2 at night ONLY. She is wanting a portal O2 concentrator when she travels but the insurance will not cover it she will have to pay out of pocket.      I informed the pt of this and she will still like like the order faxed to preferred. Please sign pending order.   "

## 2019-10-17 ENCOUNTER — TELEPHONE (OUTPATIENT)
Dept: PULMONOLOGY | Facility: HOSPICE | Age: 44
End: 2019-10-17

## 2019-10-17 DIAGNOSIS — J44.9 OSA AND COPD OVERLAP SYNDROME (HCC): ICD-10-CM

## 2019-10-17 DIAGNOSIS — J44.9 CHRONIC OBSTRUCTIVE PULMONARY DISEASE, UNSPECIFIED COPD TYPE (HCC): ICD-10-CM

## 2019-10-17 DIAGNOSIS — G47.33 OSA AND COPD OVERLAP SYNDROME (HCC): ICD-10-CM

## 2019-10-17 DIAGNOSIS — G47.33 OSA (OBSTRUCTIVE SLEEP APNEA): ICD-10-CM

## 2019-11-13 ENCOUNTER — OFFICE VISIT (OUTPATIENT)
Dept: URGENT CARE | Facility: CLINIC | Age: 44
End: 2019-11-13
Payer: MEDICAID

## 2019-11-13 VITALS
WEIGHT: 288 LBS | RESPIRATION RATE: 14 BRPM | BODY MASS INDEX: 54.37 KG/M2 | SYSTOLIC BLOOD PRESSURE: 126 MMHG | DIASTOLIC BLOOD PRESSURE: 74 MMHG | TEMPERATURE: 99.8 F | HEIGHT: 61 IN | HEART RATE: 79 BPM | OXYGEN SATURATION: 93 %

## 2019-11-13 DIAGNOSIS — R05.9 COUGH: ICD-10-CM

## 2019-11-13 DIAGNOSIS — J44.1 COPD WITH ACUTE EXACERBATION (HCC): Primary | ICD-10-CM

## 2019-11-13 DIAGNOSIS — R06.2 WHEEZE: ICD-10-CM

## 2019-11-13 PROCEDURE — 99214 OFFICE O/P EST MOD 30 MIN: CPT | Performed by: PHYSICIAN ASSISTANT

## 2019-11-13 RX ORDER — IPRATROPIUM BROMIDE AND ALBUTEROL SULFATE 2.5; .5 MG/3ML; MG/3ML
3 SOLUTION RESPIRATORY (INHALATION) ONCE
Status: COMPLETED | OUTPATIENT
Start: 2019-11-13 | End: 2019-11-13

## 2019-11-13 RX ORDER — DOXYCYCLINE HYCLATE 100 MG
100 TABLET ORAL 2 TIMES DAILY
Qty: 20 TAB | Refills: 0 | Status: ON HOLD | OUTPATIENT
Start: 2019-11-13 | End: 2019-11-19

## 2019-11-13 RX ORDER — ALBUTEROL SULFATE 90 UG/1
2 AEROSOL, METERED RESPIRATORY (INHALATION) EVERY 6 HOURS PRN
Qty: 8.5 G | Refills: 2 | Status: SHIPPED | OUTPATIENT
Start: 2019-11-13 | End: 2020-11-16 | Stop reason: SDUPTHER

## 2019-11-13 RX ADMIN — IPRATROPIUM BROMIDE AND ALBUTEROL SULFATE 3 ML: 2.5; .5 SOLUTION RESPIRATORY (INHALATION) at 16:22

## 2019-11-13 ASSESSMENT — ENCOUNTER SYMPTOMS: SHORTNESS OF BREATH: 1

## 2019-11-13 NOTE — PROGRESS NOTES
Subjective:      Lorene Haro is a 44 y.o. female who presents with Shortness of Breath (wheezing x4 days )    PMH:  has a past medical history of Asthma, Bipolar 2 disorder (HCC), Chickenpox, Other psychological stress, and Schizophrenic disorder ([facility]). She also has no past medical history of CAD (coronary artery disease), Cancer (HCC), Congestive heart failure (HCC), COPD, Diabetes, Hypertension, Infectious disease, Liver disease, Renal disorder, Seizure disorder (HCC), or Stroke ([facility]).  MEDS:   Current Outpatient Medications:   •  Acetaminophen (TYLENOL PO), Take  by mouth., Disp: , Rfl:   •  lithium carbonate 300 MG capsule, , Disp: , Rfl:   •  Loratadine 10 MG Cap, Take 1 Tab by mouth 1 time daily as needed (allergy)., Disp: , Rfl:   •  Fluticasone Propionate, Inhal, 50 MCG/BLIST AEROSOL POWDER, BREATH ACTIVATED, Spray 1 Spray in nose 1 time daily as needed (allergy)., Disp: , Rfl:   •  aripiprazole (ABILIFY) 30 MG tablet, Take 30 mg by mouth every morning., Disp: , Rfl:   •  lithium (ESKALITH) 300 MG Tab, Take 300-600 mg by mouth 2 Times a Day. 300 mg in the morning and 600 mg at bedtime , Disp: , Rfl:   •  albuterol 108 (90 Base) MCG/ACT Aero Soln inhalation aerosol, Inhale 2 Puffs by mouth every 6 hours as needed for Shortness of Breath., Disp: , Rfl:   •  non-formulary med, Spray 2 L/min in nose Continuous., Disp: , Rfl:   •  nystatin (MYCOSTATIN) powder, Apply 1 g to affected area(s) 2 Times a Day., Disp: , Rfl:   •  Fluticasone Furoate-Vilanterol (BREO ELLIPTA) 100-25 MCG/INH AEROSOL POWDER, BREATH ACTIVATED, Inhale 1 Puff by mouth every day., Disp: , Rfl:   ALLERGIES:   Allergies   Allergen Reactions   • Amoxicillin Rash     SURGHX:   Past Surgical History:   Procedure Laterality Date   • CHOLECYSTECTOMY     • COLECTOMY     • HYSTERECTOMY LAPAROSCOPY     • KNEE ARTHROSCOPY       SOCHX:  reports that she quit smoking about 18 months ago. Her smoking use included cigarettes. She has a 20.00 pack-year  "smoking history. She has never used smokeless tobacco. She reports that she does not drink alcohol or use drugs.  FH: Reviewed with patient, not pertinent to this visit.         Patient presents with:  COPD exacerbation, shortness of breath and increase wheezing x 4 days.  PT denies chest pain, swelling in lower extremities, or fever.  Pt states she is out of her inhaler and nebulizer medicine so she has really been wheezing today.  Pt denies any other complaint. PT does have 02 at home and has been using it with good effect.     COPD   She complains of cough, shortness of breath and wheezing. There is no chest tightness, difficulty breathing or sputum production. This is a new problem. The current episode started in the past 7 days. The problem occurs intermittently. The problem has been waxing and waning. The cough is non-productive and dry. Pertinent negatives include no appetite change, chest pain, dyspnea on exertion, ear pain, fever, headaches, nasal congestion, orthopnea, PND, rhinorrhea, sneezing or sore throat. Her symptoms are aggravated by minimal activity, strenuous activity and change in weather. Relieved by: home oxygen. She reports moderate improvement on treatment. Risk factors for lung disease include smoking/tobacco exposure. Her past medical history is significant for asthma and COPD.       Review of Systems   Constitutional: Negative for appetite change and fever.   HENT: Negative for ear pain, rhinorrhea, sneezing and sore throat.    Respiratory: Positive for cough, shortness of breath and wheezing. Negative for sputum production.    Cardiovascular: Negative for chest pain, dyspnea on exertion, palpitations, leg swelling and PND.   Neurological: Negative for headaches.   Endo/Heme/Allergies: Positive for environmental allergies.   All other systems reviewed and are negative.         Objective:     /74   Pulse 79   Temp 37.7 °C (99.8 °F)   Resp 14   Ht 1.549 m (5' 1\")   Wt (!) 130.6 kg " (288 lb)   LMP  (LMP Unknown)   SpO2 93%   BMI 54.42 kg/m²      Physical Exam  Vitals signs and nursing note reviewed.   Constitutional:       Appearance: Normal appearance. She is well-developed and well-groomed. She is not toxic-appearing.   HENT:      Head: Normocephalic and atraumatic.      Nose: Nose normal.      Mouth/Throat:      Lips: Pink.      Mouth: Mucous membranes are moist.   Eyes:      Extraocular Movements: Extraocular movements intact.      Conjunctiva/sclera: Conjunctivae normal.      Pupils: Pupils are equal, round, and reactive to light.   Neck:      Musculoskeletal: Normal range of motion and neck supple.   Cardiovascular:      Rate and Rhythm: Regular rhythm. Tachycardia present.      Pulses: Normal pulses.      Heart sounds: Normal heart sounds.   Pulmonary:      Breath sounds: Wheezing present.   Abdominal:      Palpations: Abdomen is soft.   Musculoskeletal: Normal range of motion.         General: No swelling.   Skin:     General: Skin is warm and dry.      Capillary Refill: Capillary refill takes less than 2 seconds.   Neurological:      General: No focal deficit present.      Mental Status: She is alert and oriented to person, place, and time.      Gait: Gait normal.   Psychiatric:         Mood and Affect: Mood normal.         Behavior: Behavior is cooperative.                 Assessment/Plan:     1. COPD with acute exacerbation (HCC)  ipratropium-albuterol (DUONEB) nebulizer solution    albuterol 108 (90 Base) MCG/ACT Aero Soln inhalation aerosol    doxycycline (VIBRAMYCIN) 100 MG Tab   2. Cough  ipratropium-albuterol (DUONEB) nebulizer solution    albuterol 108 (90 Base) MCG/ACT Aero Soln inhalation aerosol    doxycycline (VIBRAMYCIN) 100 MG Tab   3. Wheeze  ipratropium-albuterol (DUONEB) nebulizer solution    albuterol 108 (90 Base) MCG/ACT Aero Soln inhalation aerosol    doxycycline (VIBRAMYCIN) 100 MG Tab     PT encouraged to talk to her pulmonologist to let them know that she has  been using her home 02 more frequently throughout the day to see if they want to adjust her 02 order.      Pt to  and begin taking her medications today.    PT should follow up with PCP in 1-2 days for re-evaluation if symptoms have not improved.      Discussed red flags and reasons to return to UC or ED.  Pt and/or family verbalized understanding of diagnosis and follow up instructions and was offered informational handout on diagnosis.  PT discharged.

## 2019-11-14 ENCOUNTER — TELEPHONE (OUTPATIENT)
Dept: PULMONOLOGY | Facility: HOSPICE | Age: 44
End: 2019-11-14

## 2019-11-14 NOTE — TELEPHONE ENCOUNTER
I have left flank pain Patient calling she has not been set up with [facility] for her C-pap yet. I called them and  they state they are still waiting for insurance authorization. I called patient to let her know and she states she has a new address. We change that in our system but I asked patient to call [facility] to update them. Patient in agreement.

## 2019-11-17 ENCOUNTER — HOSPITAL ENCOUNTER (OUTPATIENT)
Facility: MEDICAL CENTER | Age: 44
End: 2019-11-19
Attending: EMERGENCY MEDICINE | Admitting: INTERNAL MEDICINE
Payer: MEDICAID

## 2019-11-17 ENCOUNTER — APPOINTMENT (OUTPATIENT)
Dept: RADIOLOGY | Facility: IMAGING CENTER | Age: 44
End: 2019-11-17
Attending: NURSE PRACTITIONER
Payer: MEDICAID

## 2019-11-17 ENCOUNTER — APPOINTMENT (OUTPATIENT)
Dept: RADIOLOGY | Facility: MEDICAL CENTER | Age: 44
End: 2019-11-17
Attending: EMERGENCY MEDICINE
Payer: MEDICAID

## 2019-11-17 ENCOUNTER — OFFICE VISIT (OUTPATIENT)
Dept: URGENT CARE | Facility: CLINIC | Age: 44
End: 2019-11-17
Payer: MEDICAID

## 2019-11-17 VITALS
OXYGEN SATURATION: 98 % | HEART RATE: 68 BPM | TEMPERATURE: 98.6 F | BODY MASS INDEX: 54.37 KG/M2 | HEIGHT: 61 IN | SYSTOLIC BLOOD PRESSURE: 128 MMHG | WEIGHT: 288 LBS | DIASTOLIC BLOOD PRESSURE: 74 MMHG | RESPIRATION RATE: 18 BRPM

## 2019-11-17 DIAGNOSIS — R07.9 CHEST PAIN, UNSPECIFIED TYPE: ICD-10-CM

## 2019-11-17 DIAGNOSIS — R09.89 PULMONARY CONGESTION: ICD-10-CM

## 2019-11-17 DIAGNOSIS — I51.7 CARDIOMEGALY: ICD-10-CM

## 2019-11-17 DIAGNOSIS — J43.1 PANLOBULAR EMPHYSEMA (HCC): ICD-10-CM

## 2019-11-17 DIAGNOSIS — J44.1 ACUTE EXACERBATION OF CHRONIC OBSTRUCTIVE PULMONARY DISEASE (COPD) (HCC): ICD-10-CM

## 2019-11-17 DIAGNOSIS — J20.9 ACUTE BRONCHITIS, UNSPECIFIED ORGANISM: ICD-10-CM

## 2019-11-17 DIAGNOSIS — R05.9 COUGH: ICD-10-CM

## 2019-11-17 PROBLEM — D72.829 LEUKOCYTOSIS: Status: ACTIVE | Noted: 2019-11-17

## 2019-11-17 LAB
ALBUMIN SERPL BCP-MCNC: 4.5 G/DL (ref 3.2–4.9)
ALBUMIN/GLOB SERPL: 1.3 G/DL
ALP SERPL-CCNC: 109 U/L (ref 30–99)
ALT SERPL-CCNC: 13 U/L (ref 2–50)
ANION GAP SERPL CALC-SCNC: 7 MMOL/L (ref 0–11.9)
ANISOCYTOSIS BLD QL SMEAR: ABNORMAL
AST SERPL-CCNC: 10 U/L (ref 12–45)
BASOPHILS # BLD AUTO: 0.2 % (ref 0–1.8)
BASOPHILS # BLD: 0.02 K/UL (ref 0–0.12)
BILIRUB SERPL-MCNC: 1 MG/DL (ref 0.1–1.5)
BUN SERPL-MCNC: 14 MG/DL (ref 8–22)
CALCIUM SERPL-MCNC: 9.9 MG/DL (ref 8.5–10.5)
CHLORIDE SERPL-SCNC: 106 MMOL/L (ref 96–112)
CO2 SERPL-SCNC: 28 MMOL/L (ref 20–33)
COMMENT 1642: NORMAL
CREAT SERPL-MCNC: 0.99 MG/DL (ref 0.5–1.4)
DACRYOCYTES BLD QL SMEAR: NORMAL
EKG IMPRESSION: NORMAL
EOSINOPHIL # BLD AUTO: 0.18 K/UL (ref 0–0.51)
EOSINOPHIL NFR BLD: 1.4 % (ref 0–6.9)
ERYTHROCYTE [DISTWIDTH] IN BLOOD BY AUTOMATED COUNT: 50.4 FL (ref 35.9–50)
GIANT PLATELETS BLD QL SMEAR: NORMAL
GLOBULIN SER CALC-MCNC: 3.5 G/DL (ref 1.9–3.5)
GLUCOSE SERPL-MCNC: 89 MG/DL (ref 65–99)
HCT VFR BLD AUTO: 39.1 % (ref 37–47)
HGB BLD-MCNC: 11.6 G/DL (ref 12–16)
IMM GRANULOCYTES # BLD AUTO: 0.06 K/UL (ref 0–0.11)
IMM GRANULOCYTES NFR BLD AUTO: 0.5 % (ref 0–0.9)
LYMPHOCYTES # BLD AUTO: 1.39 K/UL (ref 1–4.8)
LYMPHOCYTES NFR BLD: 10.8 % (ref 22–41)
MACROCYTES BLD QL SMEAR: ABNORMAL
MCH RBC QN AUTO: 29.7 PG (ref 27–33)
MCHC RBC AUTO-ENTMCNC: 29.7 G/DL (ref 33.6–35)
MCV RBC AUTO: 100 FL (ref 81.4–97.8)
MONOCYTES # BLD AUTO: 0.55 K/UL (ref 0–0.85)
MONOCYTES NFR BLD AUTO: 4.3 % (ref 0–13.4)
MORPHOLOGY BLD-IMP: NORMAL
NEUTROPHILS # BLD AUTO: 10.62 K/UL (ref 2–7.15)
NEUTROPHILS NFR BLD: 82.8 % (ref 44–72)
NRBC # BLD AUTO: 0 K/UL
NRBC BLD-RTO: 0 /100 WBC
OVALOCYTES BLD QL SMEAR: NORMAL
PLATELET # BLD AUTO: 309 K/UL (ref 164–446)
PLATELET BLD QL SMEAR: NORMAL
PMV BLD AUTO: 11.2 FL (ref 9–12.9)
POTASSIUM SERPL-SCNC: 4.2 MMOL/L (ref 3.6–5.5)
PROT SERPL-MCNC: 8 G/DL (ref 6–8.2)
RBC # BLD AUTO: 3.91 M/UL (ref 4.2–5.4)
RBC BLD AUTO: PRESENT
SODIUM SERPL-SCNC: 141 MMOL/L (ref 135–145)
TROPONIN T SERPL-MCNC: 15 NG/L (ref 6–19)
WBC # BLD AUTO: 12.8 K/UL (ref 4.8–10.8)

## 2019-11-17 PROCEDURE — 700105 HCHG RX REV CODE 258: Performed by: INTERNAL MEDICINE

## 2019-11-17 PROCEDURE — 71045 X-RAY EXAM CHEST 1 VIEW: CPT

## 2019-11-17 PROCEDURE — 93005 ELECTROCARDIOGRAM TRACING: CPT

## 2019-11-17 PROCEDURE — 96365 THER/PROPH/DIAG IV INF INIT: CPT

## 2019-11-17 PROCEDURE — 93005 ELECTROCARDIOGRAM TRACING: CPT | Performed by: EMERGENCY MEDICINE

## 2019-11-17 PROCEDURE — 99215 OFFICE O/P EST HI 40 MIN: CPT | Performed by: NURSE PRACTITIONER

## 2019-11-17 PROCEDURE — 71046 X-RAY EXAM CHEST 2 VIEWS: CPT | Mod: TC | Performed by: EMERGENCY MEDICINE

## 2019-11-17 PROCEDURE — 85025 COMPLETE CBC W/AUTO DIFF WBC: CPT

## 2019-11-17 PROCEDURE — G0378 HOSPITAL OBSERVATION PER HR: HCPCS

## 2019-11-17 PROCEDURE — 700111 HCHG RX REV CODE 636 W/ 250 OVERRIDE (IP): Performed by: EMERGENCY MEDICINE

## 2019-11-17 PROCEDURE — 700101 HCHG RX REV CODE 250: Performed by: INTERNAL MEDICINE

## 2019-11-17 PROCEDURE — 700102 HCHG RX REV CODE 250 W/ 637 OVERRIDE(OP): Performed by: INTERNAL MEDICINE

## 2019-11-17 PROCEDURE — 96367 TX/PROPH/DG ADDL SEQ IV INF: CPT

## 2019-11-17 PROCEDURE — 700101 HCHG RX REV CODE 250: Performed by: EMERGENCY MEDICINE

## 2019-11-17 PROCEDURE — 96375 TX/PRO/DX INJ NEW DRUG ADDON: CPT

## 2019-11-17 PROCEDURE — 80053 COMPREHEN METABOLIC PANEL: CPT

## 2019-11-17 PROCEDURE — A9270 NON-COVERED ITEM OR SERVICE: HCPCS | Performed by: INTERNAL MEDICINE

## 2019-11-17 PROCEDURE — 700105 HCHG RX REV CODE 258: Performed by: EMERGENCY MEDICINE

## 2019-11-17 PROCEDURE — 700111 HCHG RX REV CODE 636 W/ 250 OVERRIDE (IP): Performed by: INTERNAL MEDICINE

## 2019-11-17 PROCEDURE — 93000 ELECTROCARDIOGRAM COMPLETE: CPT | Performed by: NURSE PRACTITIONER

## 2019-11-17 PROCEDURE — 96372 THER/PROPH/DIAG INJ SC/IM: CPT | Mod: XU

## 2019-11-17 PROCEDURE — 84484 ASSAY OF TROPONIN QUANT: CPT

## 2019-11-17 PROCEDURE — 99220 PR INITIAL OBSERVATION CARE,LEVL III: CPT | Performed by: INTERNAL MEDICINE

## 2019-11-17 PROCEDURE — 99285 EMERGENCY DEPT VISIT HI MDM: CPT

## 2019-11-17 PROCEDURE — 94640 AIRWAY INHALATION TREATMENT: CPT

## 2019-11-17 RX ORDER — TRIAMCINOLONE ACETONIDE 5 MG/G
1 CREAM TOPICAL
Refills: 0 | COMMUNITY
Start: 2019-10-17 | End: 2020-07-14

## 2019-11-17 RX ORDER — MONTELUKAST SODIUM 10 MG/1
10 TABLET ORAL
COMMUNITY
Start: 2019-10-04 | End: 2020-11-16 | Stop reason: SDUPTHER

## 2019-11-17 RX ORDER — DOXYCYCLINE 100 MG/1
100 TABLET ORAL EVERY 12 HOURS
Status: DISCONTINUED | OUTPATIENT
Start: 2019-11-17 | End: 2019-11-19 | Stop reason: HOSPADM

## 2019-11-17 RX ORDER — PROCHLORPERAZINE EDISYLATE 5 MG/ML
5-10 INJECTION INTRAMUSCULAR; INTRAVENOUS EVERY 4 HOURS PRN
Status: DISCONTINUED | OUTPATIENT
Start: 2019-11-17 | End: 2019-11-18

## 2019-11-17 RX ORDER — ALBUTEROL SULFATE 90 UG/1
2 AEROSOL, METERED RESPIRATORY (INHALATION)
Status: DISCONTINUED | OUTPATIENT
Start: 2019-11-17 | End: 2019-11-19 | Stop reason: HOSPADM

## 2019-11-17 RX ORDER — TIOTROPIUM BROMIDE 18 UG/1
1 CAPSULE ORAL; RESPIRATORY (INHALATION)
Status: DISCONTINUED | OUTPATIENT
Start: 2019-11-17 | End: 2019-11-19 | Stop reason: HOSPADM

## 2019-11-17 RX ORDER — FLUTICASONE PROPIONATE 50 MCG
1 SPRAY, SUSPENSION (ML) NASAL 2 TIMES DAILY
COMMUNITY
Start: 2019-11-10 | End: 2020-11-08

## 2019-11-17 RX ORDER — IPRATROPIUM BROMIDE AND ALBUTEROL SULFATE 2.5; .5 MG/3ML; MG/3ML
3 SOLUTION RESPIRATORY (INHALATION)
Status: DISCONTINUED | OUTPATIENT
Start: 2019-11-17 | End: 2019-11-18

## 2019-11-17 RX ORDER — BISACODYL 10 MG
10 SUPPOSITORY, RECTAL RECTAL
Status: DISCONTINUED | OUTPATIENT
Start: 2019-11-17 | End: 2019-11-19 | Stop reason: HOSPADM

## 2019-11-17 RX ORDER — SODIUM CHLORIDE 9 MG/ML
INJECTION, SOLUTION INTRAVENOUS CONTINUOUS
Status: DISCONTINUED | OUTPATIENT
Start: 2019-11-17 | End: 2019-11-18

## 2019-11-17 RX ORDER — RANITIDINE 150 MG/1
150 TABLET ORAL DAILY
COMMUNITY
Start: 2019-09-14 | End: 2020-07-14

## 2019-11-17 RX ORDER — AMOXICILLIN 250 MG
2 CAPSULE ORAL 2 TIMES DAILY
Status: DISCONTINUED | OUTPATIENT
Start: 2019-11-17 | End: 2019-11-19 | Stop reason: HOSPADM

## 2019-11-17 RX ORDER — POLYETHYLENE GLYCOL 3350 17 G/17G
1 POWDER, FOR SOLUTION ORAL
Status: DISCONTINUED | OUTPATIENT
Start: 2019-11-17 | End: 2019-11-19 | Stop reason: HOSPADM

## 2019-11-17 RX ORDER — HEPARIN SODIUM 5000 [USP'U]/ML
5000 INJECTION, SOLUTION INTRAVENOUS; SUBCUTANEOUS EVERY 8 HOURS
Status: DISCONTINUED | OUTPATIENT
Start: 2019-11-17 | End: 2019-11-17

## 2019-11-17 RX ORDER — PROMETHAZINE HYDROCHLORIDE 25 MG/1
12.5-25 SUPPOSITORY RECTAL EVERY 4 HOURS PRN
Status: DISCONTINUED | OUTPATIENT
Start: 2019-11-17 | End: 2019-11-18

## 2019-11-17 RX ORDER — BENZTROPINE MESYLATE 1 MG/1
1 TABLET ORAL
COMMUNITY
Start: 2019-10-20 | End: 2021-01-18

## 2019-11-17 RX ORDER — GUAIFENESIN 600 MG/1
1200 TABLET, EXTENDED RELEASE ORAL 2 TIMES DAILY
Status: DISCONTINUED | OUTPATIENT
Start: 2019-11-17 | End: 2019-11-19 | Stop reason: HOSPADM

## 2019-11-17 RX ORDER — HYDROXYZINE HYDROCHLORIDE 25 MG/1
25 TABLET, FILM COATED ORAL 3 TIMES DAILY PRN
COMMUNITY
Start: 2019-08-21 | End: 2020-11-08

## 2019-11-17 RX ORDER — ARIPIPRAZOLE 10 MG/1
30 TABLET ORAL EVERY MORNING
Status: DISCONTINUED | OUTPATIENT
Start: 2019-11-18 | End: 2019-11-19 | Stop reason: HOSPADM

## 2019-11-17 RX ORDER — PREDNISONE 20 MG/1
40 TABLET ORAL DAILY
Status: DISCONTINUED | OUTPATIENT
Start: 2019-11-17 | End: 2019-11-17

## 2019-11-17 RX ORDER — HALOPERIDOL 5 MG/1
5 TABLET ORAL
COMMUNITY
Start: 2019-09-05 | End: 2021-03-19

## 2019-11-17 RX ORDER — LITHIUM CARBONATE 300 MG/1
300-600 TABLET, FILM COATED, EXTENDED RELEASE ORAL 2 TIMES DAILY
COMMUNITY
Start: 2019-10-20 | End: 2021-07-16

## 2019-11-17 RX ORDER — METHYLPREDNISOLONE SODIUM SUCCINATE 125 MG/2ML
125 INJECTION, POWDER, LYOPHILIZED, FOR SOLUTION INTRAMUSCULAR; INTRAVENOUS ONCE
Status: COMPLETED | OUTPATIENT
Start: 2019-11-17 | End: 2019-11-17

## 2019-11-17 RX ORDER — ONDANSETRON 4 MG/1
4 TABLET, ORALLY DISINTEGRATING ORAL EVERY 4 HOURS PRN
Status: DISCONTINUED | OUTPATIENT
Start: 2019-11-17 | End: 2019-11-19 | Stop reason: HOSPADM

## 2019-11-17 RX ORDER — ATORVASTATIN CALCIUM 20 MG/1
20 TABLET, FILM COATED ORAL
COMMUNITY
Start: 2019-08-28 | End: 2020-11-08

## 2019-11-17 RX ORDER — PROMETHAZINE HYDROCHLORIDE 25 MG/1
12.5-25 TABLET ORAL EVERY 4 HOURS PRN
Status: DISCONTINUED | OUTPATIENT
Start: 2019-11-17 | End: 2019-11-18

## 2019-11-17 RX ORDER — ACETAMINOPHEN 325 MG/1
650 TABLET ORAL EVERY 6 HOURS PRN
Status: DISCONTINUED | OUTPATIENT
Start: 2019-11-17 | End: 2019-11-19 | Stop reason: HOSPADM

## 2019-11-17 RX ORDER — ONDANSETRON 2 MG/ML
4 INJECTION INTRAMUSCULAR; INTRAVENOUS EVERY 4 HOURS PRN
Status: DISCONTINUED | OUTPATIENT
Start: 2019-11-17 | End: 2019-11-19 | Stop reason: HOSPADM

## 2019-11-17 RX ADMIN — SENNOSIDES AND DOCUSATE SODIUM 2 TABLET: 8.6; 5 TABLET ORAL at 18:18

## 2019-11-17 RX ADMIN — ENOXAPARIN SODIUM 40 MG: 100 INJECTION SUBCUTANEOUS at 18:17

## 2019-11-17 RX ADMIN — CEFTRIAXONE SODIUM 2 G: 2 INJECTION, POWDER, FOR SOLUTION INTRAMUSCULAR; INTRAVENOUS at 21:24

## 2019-11-17 RX ADMIN — ALBUTEROL SULFATE 2.5 MG: 2.5 SOLUTION RESPIRATORY (INHALATION) at 15:50

## 2019-11-17 RX ADMIN — SODIUM CHLORIDE: 9 INJECTION, SOLUTION INTRAVENOUS at 18:17

## 2019-11-17 RX ADMIN — IPRATROPIUM BROMIDE AND ALBUTEROL SULFATE 3 ML: .5; 3 SOLUTION RESPIRATORY (INHALATION) at 19:01

## 2019-11-17 RX ADMIN — GUAIFENESIN 1200 MG: 600 TABLET, EXTENDED RELEASE ORAL at 18:17

## 2019-11-17 RX ADMIN — METHYLPREDNISOLONE SODIUM SUCCINATE 125 MG: 125 INJECTION, POWDER, FOR SOLUTION INTRAMUSCULAR; INTRAVENOUS at 15:39

## 2019-11-17 RX ADMIN — DOXYCYCLINE 100 MG: 100 INJECTION, POWDER, LYOPHILIZED, FOR SOLUTION INTRAVENOUS at 15:39

## 2019-11-17 RX ADMIN — IPRATROPIUM BROMIDE AND ALBUTEROL SULFATE 3 ML: .5; 3 SOLUTION RESPIRATORY (INHALATION) at 23:00

## 2019-11-17 RX ADMIN — PREDNISONE 40 MG: 20 TABLET ORAL at 18:17

## 2019-11-17 RX ADMIN — DOXYCYCLINE 100 MG: 100 TABLET, FILM COATED ORAL at 18:17

## 2019-11-17 ASSESSMENT — ENCOUNTER SYMPTOMS
SPUTUM PRODUCTION: 1
PALPITATIONS: 0
SORE THROAT: 1
ORTHOPNEA: 0
HEADACHES: 0
COUGH: 1
BLURRED VISION: 0
CHILLS: 0
WEIGHT LOSS: 0
BLURRED VISION: 0
APPETITE CHANGE: 0
MYALGIAS: 0
DYSPNEA ON EXERTION: 0
SEIZURES: 0
HEARTBURN: 0
EYE PAIN: 0
SORE THROAT: 0
STRIDOR: 0
BACK PAIN: 0
EYE DISCHARGE: 0
PALPITATIONS: 0
DOUBLE VISION: 0
NERVOUS/ANXIOUS: 0
FEVER: 0
CHEST TIGHTNESS: 0
CONSTIPATION: 0
DIARRHEA: 0
SPUTUM PRODUCTION: 0
NECK PAIN: 0
ORTHOPNEA: 0
DIFFICULTY BREATHING: 0
DIARRHEA: 0
COUGH: 1
STRIDOR: 0
FEVER: 0
PND: 0
VOMITING: 0
DEPRESSION: 0
WHEEZING: 1
VOMITING: 0
MUSCULOSKELETAL NEGATIVE: 1
CHILLS: 0
EYE REDNESS: 0
SINUS PAIN: 1
SHORTNESS OF BREATH: 1
HEADACHES: 0
HEADACHES: 0
NAUSEA: 0
SPUTUM PRODUCTION: 1
DIZZINESS: 0
DIZZINESS: 0
PALPITATIONS: 0
WHEEZING: 1
INSOMNIA: 0
COUGH: 1
FEVER: 0
FOCAL WEAKNESS: 0
RHINORRHEA: 0
SHORTNESS OF BREATH: 1
ABDOMINAL PAIN: 0
NAUSEA: 0
ABDOMINAL PAIN: 0

## 2019-11-17 ASSESSMENT — COGNITIVE AND FUNCTIONAL STATUS - GENERAL
SUGGESTED CMS G CODE MODIFIER MOBILITY: CK
MOVING TO AND FROM BED TO CHAIR: A LITTLE
MOVING FROM LYING ON BACK TO SITTING ON SIDE OF FLAT BED: A LITTLE
CLIMB 3 TO 5 STEPS WITH RAILING: A LOT
WALKING IN HOSPITAL ROOM: A LITTLE
DAILY ACTIVITIY SCORE: 21
DRESSING REGULAR UPPER BODY CLOTHING: A LITTLE
DRESSING REGULAR LOWER BODY CLOTHING: A LITTLE
HELP NEEDED FOR BATHING: A LITTLE
STANDING UP FROM CHAIR USING ARMS: A LITTLE
MOBILITY SCORE: 17
TURNING FROM BACK TO SIDE WHILE IN FLAT BAD: A LITTLE
SUGGESTED CMS G CODE MODIFIER DAILY ACTIVITY: CJ

## 2019-11-17 ASSESSMENT — COPD QUESTIONNAIRES
COPD SCREENING SCORE: 4
HAVE YOU SMOKED AT LEAST 100 CIGARETTES IN YOUR ENTIRE LIFE: YES
DURING THE PAST 4 WEEKS HOW MUCH DID YOU FEEL SHORT OF BREATH: MOST  OR ALL OF THE TIME
DO YOU EVER COUGH UP ANY MUCUS OR PHLEGM?: NO/ONLY WITH OCCASIONAL COLDS OR INFECTIONS
COPD: 1
COPD: 1

## 2019-11-17 ASSESSMENT — LIFESTYLE VARIABLES
EVER HAD A DRINK FIRST THING IN THE MORNING TO STEADY YOUR NERVES TO GET RID OF A HANGOVER: NO
TOTAL SCORE: 0
TOTAL SCORE: 0
ON A TYPICAL DAY WHEN YOU DRINK ALCOHOL HOW MANY DRINKS DO YOU HAVE: 0
DOES PATIENT WANT TO STOP DRINKING: NO
CONSUMPTION TOTAL: NEGATIVE
AVERAGE NUMBER OF DAYS PER WEEK YOU HAVE A DRINK CONTAINING ALCOHOL: 0
HAVE YOU EVER FELT YOU SHOULD CUT DOWN ON YOUR DRINKING: NO
ALCOHOL_USE: NO
EVER FELT BAD OR GUILTY ABOUT YOUR DRINKING: NO
TOTAL SCORE: 0
HOW MANY TIMES IN THE PAST YEAR HAVE YOU HAD 5 OR MORE DRINKS IN A DAY: 0
EVER_SMOKED: YES
HAVE PEOPLE ANNOYED YOU BY CRITICIZING YOUR DRINKING: NO
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
## 2019-11-17 NOTE — FLOWSHEET NOTE
11/17/19 1551   Interdisciplinary Plan of Care-Goals (Indications)   Bronchodilator Indications History / Diagnosis   Interdisciplinary Plan of Care-Outcomes    Bronchodilator Outcome Patient at Stable Baseline   Education   Education Yes - Pt. / Family has been Instructed in use of Respiratory Medications and Adverse Reactions   RT Assessment of Delivered Medications   Evaluation of Medication Delivery Daily Yes-- Pt /Family has been Instructed in use of Respiratory Medications and Adverse Reactions   SVN Group   #SVN Performed Yes   Given By: Mouthpiece   Date SVN Last Changed 11/17/19   Date SVN Next Change Due (Q 7 Days) 11/24/19   Respiratory WDL   Respiratory (WDL) X   Chest Exam   Respiration (!) 22   Pulse 72   Breath Sounds   Pre/Post Intervention Pre Intervention Assessment   RUL Breath Sounds Diminished   RML Breath Sounds Diminished   RLL Breath Sounds Diminished   BERNARDO Breath Sounds Diminished   LLL Breath Sounds Diminished   Oximetry   Continuous Oximetry Yes   O2 Alarms Set & Reviewed Yes   Oxygen   Pulse Oximetry 98 %   O2 (LPM) 2   O2 Daily Delivery Respiratory  Nasal Cannula

## 2019-11-17 NOTE — ED TRIAGE NOTES
Pt comes in complaining of SOB, cough, congestion. Pt stating symptoms began late Thursday evening. Pt was seen at Urgent care and sent here for an abnormal EKG.
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
## 2019-11-18 ENCOUNTER — PATIENT OUTREACH (OUTPATIENT)
Dept: HEALTH INFORMATION MANAGEMENT | Facility: OTHER | Age: 44
End: 2019-11-18

## 2019-11-18 ENCOUNTER — APPOINTMENT (OUTPATIENT)
Dept: CARDIOLOGY | Facility: MEDICAL CENTER | Age: 44
End: 2019-11-18
Attending: INTERNAL MEDICINE
Payer: MEDICAID

## 2019-11-18 ENCOUNTER — APPOINTMENT (OUTPATIENT)
Dept: RADIOLOGY | Facility: MEDICAL CENTER | Age: 44
End: 2019-11-18
Attending: INTERNAL MEDICINE
Payer: MEDICAID

## 2019-11-18 PROBLEM — D53.9 MACROCYTIC ANEMIA: Status: ACTIVE | Noted: 2019-11-18

## 2019-11-18 PROBLEM — E66.01 MORBID OBESITY (HCC): Status: ACTIVE | Noted: 2019-11-18

## 2019-11-18 PROBLEM — J96.21 ACUTE ON CHRONIC RESPIRATORY FAILURE WITH HYPOXIA (HCC): Status: ACTIVE | Noted: 2019-11-18

## 2019-11-18 PROBLEM — R53.81 DEBILITY: Status: ACTIVE | Noted: 2019-11-18

## 2019-11-18 PROBLEM — D72.829 LEUKOCYTOSIS: Status: RESOLVED | Noted: 2019-11-17 | Resolved: 2019-11-18

## 2019-11-18 LAB
ALBUMIN SERPL BCP-MCNC: 3.9 G/DL (ref 3.2–4.9)
ALBUMIN/GLOB SERPL: 1.2 G/DL
ALP SERPL-CCNC: 93 U/L (ref 30–99)
ALT SERPL-CCNC: 11 U/L (ref 2–50)
ANION GAP SERPL CALC-SCNC: 8 MMOL/L (ref 0–11.9)
AST SERPL-CCNC: 8 U/L (ref 12–45)
BILIRUB SERPL-MCNC: 0.6 MG/DL (ref 0.1–1.5)
BUN SERPL-MCNC: 14 MG/DL (ref 8–22)
CALCIUM SERPL-MCNC: 9.3 MG/DL (ref 8.5–10.5)
CHLORIDE SERPL-SCNC: 107 MMOL/L (ref 96–112)
CO2 SERPL-SCNC: 25 MMOL/L (ref 20–33)
CREAT SERPL-MCNC: 0.8 MG/DL (ref 0.5–1.4)
ERYTHROCYTE [DISTWIDTH] IN BLOOD BY AUTOMATED COUNT: 48.7 FL (ref 35.9–50)
GLOBULIN SER CALC-MCNC: 3.2 G/DL (ref 1.9–3.5)
GLUCOSE SERPL-MCNC: 148 MG/DL (ref 65–99)
HCT VFR BLD AUTO: 35.1 % (ref 37–47)
HGB BLD-MCNC: 10.4 G/DL (ref 12–16)
LV EJECT FRACT  99904: 60
LV EJECT FRACT MOD 2C 99903: 71.12
LV EJECT FRACT MOD 4C 99902: 62.42
LV EJECT FRACT MOD BP 99901: 65.65
MCH RBC QN AUTO: 29.5 PG (ref 27–33)
MCHC RBC AUTO-ENTMCNC: 29.6 G/DL (ref 33.6–35)
MCV RBC AUTO: 99.7 FL (ref 81.4–97.8)
MORPHOLOGY BLD-IMP: NORMAL
PLATELET # BLD AUTO: 248 K/UL (ref 164–446)
PMV BLD AUTO: 11.3 FL (ref 9–12.9)
POTASSIUM SERPL-SCNC: 4.4 MMOL/L (ref 3.6–5.5)
PROT SERPL-MCNC: 7.1 G/DL (ref 6–8.2)
RBC # BLD AUTO: 3.52 M/UL (ref 4.2–5.4)
SODIUM SERPL-SCNC: 140 MMOL/L (ref 135–145)
WBC # BLD AUTO: 9.6 K/UL (ref 4.8–10.8)

## 2019-11-18 PROCEDURE — 94667 MNPJ CHEST WALL 1ST: CPT

## 2019-11-18 PROCEDURE — 93970 EXTREMITY STUDY: CPT

## 2019-11-18 PROCEDURE — 93306 TTE W/DOPPLER COMPLETE: CPT

## 2019-11-18 PROCEDURE — A9270 NON-COVERED ITEM OR SERVICE: HCPCS | Performed by: INTERNAL MEDICINE

## 2019-11-18 PROCEDURE — 96367 TX/PROPH/DG ADDL SEQ IV INF: CPT

## 2019-11-18 PROCEDURE — 700111 HCHG RX REV CODE 636 W/ 250 OVERRIDE (IP): Performed by: INTERNAL MEDICINE

## 2019-11-18 PROCEDURE — 99226 PR SUBSEQUENT OBSERVATION CARE,LEVEL III: CPT | Performed by: INTERNAL MEDICINE

## 2019-11-18 PROCEDURE — 80053 COMPREHEN METABOLIC PANEL: CPT

## 2019-11-18 PROCEDURE — G0378 HOSPITAL OBSERVATION PER HR: HCPCS

## 2019-11-18 PROCEDURE — 700102 HCHG RX REV CODE 250 W/ 637 OVERRIDE(OP): Performed by: INTERNAL MEDICINE

## 2019-11-18 PROCEDURE — 5A09357 ASSISTANCE WITH RESPIRATORY VENTILATION, LESS THAN 24 CONSECUTIVE HOURS, CONTINUOUS POSITIVE AIRWAY PRESSURE: ICD-10-PCS | Performed by: HOSPITALIST

## 2019-11-18 PROCEDURE — 700105 HCHG RX REV CODE 258: Performed by: INTERNAL MEDICINE

## 2019-11-18 PROCEDURE — 36415 COLL VENOUS BLD VENIPUNCTURE: CPT

## 2019-11-18 PROCEDURE — 700101 HCHG RX REV CODE 250: Performed by: INTERNAL MEDICINE

## 2019-11-18 PROCEDURE — 94669 MECHANICAL CHEST WALL OSCILL: CPT

## 2019-11-18 PROCEDURE — 94640 AIRWAY INHALATION TREATMENT: CPT

## 2019-11-18 PROCEDURE — 93970 EXTREMITY STUDY: CPT | Mod: 26 | Performed by: INTERNAL MEDICINE

## 2019-11-18 PROCEDURE — 85027 COMPLETE CBC AUTOMATED: CPT

## 2019-11-18 PROCEDURE — 96372 THER/PROPH/DIAG INJ SC/IM: CPT

## 2019-11-18 PROCEDURE — 93306 TTE W/DOPPLER COMPLETE: CPT | Mod: 26 | Performed by: INTERNAL MEDICINE

## 2019-11-18 PROCEDURE — 94668 MNPJ CHEST WALL SBSQ: CPT

## 2019-11-18 RX ORDER — RANITIDINE 150 MG/1
150 TABLET ORAL DAILY
Status: DISCONTINUED | OUTPATIENT
Start: 2019-11-18 | End: 2019-11-18

## 2019-11-18 RX ORDER — LITHIUM CARBONATE 300 MG/1
300 TABLET, FILM COATED, EXTENDED RELEASE ORAL EVERY MORNING
Status: DISCONTINUED | OUTPATIENT
Start: 2019-11-18 | End: 2019-11-19 | Stop reason: HOSPADM

## 2019-11-18 RX ORDER — FLUTICASONE PROPIONATE 50 MCG
1 SPRAY, SUSPENSION (ML) NASAL 2 TIMES DAILY
Status: DISCONTINUED | OUTPATIENT
Start: 2019-11-18 | End: 2019-11-19 | Stop reason: HOSPADM

## 2019-11-18 RX ORDER — LITHIUM CARBONATE 300 MG/1
600 TABLET, FILM COATED, EXTENDED RELEASE ORAL
Status: DISCONTINUED | OUTPATIENT
Start: 2019-11-18 | End: 2019-11-19 | Stop reason: HOSPADM

## 2019-11-18 RX ORDER — IPRATROPIUM BROMIDE AND ALBUTEROL SULFATE 2.5; .5 MG/3ML; MG/3ML
3 SOLUTION RESPIRATORY (INHALATION)
Status: DISCONTINUED | OUTPATIENT
Start: 2019-11-19 | End: 2019-11-19 | Stop reason: HOSPADM

## 2019-11-18 RX ORDER — MONTELUKAST SODIUM 10 MG/1
10 TABLET ORAL
Status: DISCONTINUED | OUTPATIENT
Start: 2019-11-18 | End: 2019-11-19 | Stop reason: HOSPADM

## 2019-11-18 RX ORDER — HALOPERIDOL 5 MG/1
5 TABLET ORAL
Status: DISCONTINUED | OUTPATIENT
Start: 2019-11-18 | End: 2019-11-19 | Stop reason: HOSPADM

## 2019-11-18 RX ORDER — LITHIUM CARBONATE 300 MG/1
300-600 TABLET, FILM COATED, EXTENDED RELEASE ORAL 2 TIMES DAILY
Status: DISCONTINUED | OUTPATIENT
Start: 2019-11-18 | End: 2019-11-18

## 2019-11-18 RX ORDER — CEFDINIR 300 MG/1
300 CAPSULE ORAL EVERY 12 HOURS
Status: DISCONTINUED | OUTPATIENT
Start: 2019-11-18 | End: 2019-11-19 | Stop reason: HOSPADM

## 2019-11-18 RX ORDER — HYDROXYZINE HYDROCHLORIDE 25 MG/1
25 TABLET, FILM COATED ORAL 2 TIMES DAILY PRN
Status: DISCONTINUED | OUTPATIENT
Start: 2019-11-18 | End: 2019-11-19 | Stop reason: HOSPADM

## 2019-11-18 RX ORDER — BENZTROPINE MESYLATE 1 MG/1
1 TABLET ORAL
Status: DISCONTINUED | OUTPATIENT
Start: 2019-11-18 | End: 2019-11-19 | Stop reason: HOSPADM

## 2019-11-18 RX ORDER — FAMOTIDINE 20 MG/1
20 TABLET, FILM COATED ORAL DAILY
Status: DISCONTINUED | OUTPATIENT
Start: 2019-11-18 | End: 2019-11-19 | Stop reason: HOSPADM

## 2019-11-18 RX ORDER — FUROSEMIDE 10 MG/ML
20 INJECTION INTRAMUSCULAR; INTRAVENOUS
Status: DISCONTINUED | OUTPATIENT
Start: 2019-11-18 | End: 2019-11-19 | Stop reason: HOSPADM

## 2019-11-18 RX ORDER — ATORVASTATIN CALCIUM 20 MG/1
20 TABLET, FILM COATED ORAL
Status: DISCONTINUED | OUTPATIENT
Start: 2019-11-18 | End: 2019-11-19 | Stop reason: HOSPADM

## 2019-11-18 RX ADMIN — SODIUM CHLORIDE: 9 INJECTION, SOLUTION INTRAVENOUS at 04:44

## 2019-11-18 RX ADMIN — IPRATROPIUM BROMIDE AND ALBUTEROL SULFATE 3 ML: .5; 3 SOLUTION RESPIRATORY (INHALATION) at 14:18

## 2019-11-18 RX ADMIN — LITHIUM CARBONATE 600 MG: 300 TABLET, FILM COATED, EXTENDED RELEASE ORAL at 20:50

## 2019-11-18 RX ADMIN — ENOXAPARIN SODIUM 40 MG: 100 INJECTION SUBCUTANEOUS at 17:20

## 2019-11-18 RX ADMIN — FAMOTIDINE 20 MG: 20 TABLET ORAL at 14:33

## 2019-11-18 RX ADMIN — BENZTROPINE MESYLATE 1 MG: 1 TABLET ORAL at 20:49

## 2019-11-18 RX ADMIN — ATORVASTATIN CALCIUM 20 MG: 20 TABLET, FILM COATED ORAL at 20:49

## 2019-11-18 RX ADMIN — DOXYCYCLINE 100 MG: 100 TABLET, FILM COATED ORAL at 17:20

## 2019-11-18 RX ADMIN — IPRATROPIUM BROMIDE AND ALBUTEROL SULFATE 3 ML: .5; 3 SOLUTION RESPIRATORY (INHALATION) at 02:20

## 2019-11-18 RX ADMIN — DOXYCYCLINE 100 MG: 100 TABLET, FILM COATED ORAL at 04:45

## 2019-11-18 RX ADMIN — FUROSEMIDE 20 MG: 10 INJECTION, SOLUTION INTRAMUSCULAR; INTRAVENOUS at 13:35

## 2019-11-18 RX ADMIN — SENNOSIDES AND DOCUSATE SODIUM 2 TABLET: 8.6; 5 TABLET ORAL at 04:49

## 2019-11-18 RX ADMIN — MONTELUKAST 10 MG: 10 TABLET, FILM COATED ORAL at 20:49

## 2019-11-18 RX ADMIN — FLUTICASONE PROPIONATE 50 MCG: 50 SPRAY, METERED NASAL at 17:21

## 2019-11-18 RX ADMIN — IPRATROPIUM BROMIDE AND ALBUTEROL SULFATE 3 ML: .5; 3 SOLUTION RESPIRATORY (INHALATION) at 19:08

## 2019-11-18 RX ADMIN — ARIPIPRAZOLE 30 MG: 10 TABLET ORAL at 04:45

## 2019-11-18 RX ADMIN — ACETAMINOPHEN 650 MG: 325 TABLET, FILM COATED ORAL at 07:42

## 2019-11-18 RX ADMIN — IPRATROPIUM BROMIDE AND ALBUTEROL SULFATE 3 ML: .5; 3 SOLUTION RESPIRATORY (INHALATION) at 10:42

## 2019-11-18 RX ADMIN — LITHIUM CARBONATE 300 MG: 300 TABLET, FILM COATED, EXTENDED RELEASE ORAL at 13:35

## 2019-11-18 RX ADMIN — GUAIFENESIN 1200 MG: 600 TABLET, EXTENDED RELEASE ORAL at 17:20

## 2019-11-18 RX ADMIN — GUAIFENESIN 1200 MG: 600 TABLET, EXTENDED RELEASE ORAL at 04:45

## 2019-11-18 RX ADMIN — TIOTROPIUM BROMIDE 1 CAPSULE: 18 CAPSULE ORAL; RESPIRATORY (INHALATION) at 07:07

## 2019-11-18 RX ADMIN — HALOPERIDOL 5 MG: 5 TABLET ORAL at 20:50

## 2019-11-18 RX ADMIN — IPRATROPIUM BROMIDE AND ALBUTEROL SULFATE 3 ML: .5; 3 SOLUTION RESPIRATORY (INHALATION) at 07:06

## 2019-11-18 RX ADMIN — CEFDINIR 300 MG: 300 CAPSULE ORAL at 17:20

## 2019-11-18 SDOH — ECONOMIC STABILITY: FOOD INSECURITY: WITHIN THE PAST 12 MONTHS, THE FOOD YOU BOUGHT JUST DIDN'T LAST AND YOU DIDN'T HAVE MONEY TO GET MORE.: NEVER TRUE

## 2019-11-18 SDOH — ECONOMIC STABILITY: FOOD INSECURITY: WITHIN THE PAST 12 MONTHS, YOU WORRIED THAT YOUR FOOD WOULD RUN OUT BEFORE YOU GOT MONEY TO BUY MORE.: NEVER TRUE

## 2019-11-18 SDOH — ECONOMIC STABILITY: TRANSPORTATION INSECURITY
IN THE PAST 12 MONTHS, HAS THE LACK OF TRANSPORTATION KEPT YOU FROM MEDICAL APPOINTMENTS OR FROM GETTING MEDICATIONS?: NO

## 2019-11-18 SDOH — ECONOMIC STABILITY: INCOME INSECURITY: HOW HARD IS IT FOR YOU TO PAY FOR THE VERY BASICS LIKE FOOD, HOUSING, MEDICAL CARE, AND HEATING?: NOT HARD AT ALL

## 2019-11-18 SDOH — ECONOMIC STABILITY: TRANSPORTATION INSECURITY
IN THE PAST 12 MONTHS, HAS LACK OF TRANSPORTATION KEPT YOU FROM MEETINGS, WORK, OR FROM GETTING THINGS NEEDED FOR DAILY LIVING?: NO

## 2019-11-18 ASSESSMENT — ENCOUNTER SYMPTOMS
DIZZINESS: 0
NECK PAIN: 0
SPUTUM PRODUCTION: 0
CHILLS: 0
DIARRHEA: 0
MYALGIAS: 0
NERVOUS/ANXIOUS: 1
WHEEZING: 0
PND: 0
ABDOMINAL PAIN: 0
COUGH: 1
FEVER: 0
VOMITING: 0
HEMOPTYSIS: 0
HEADACHES: 0
SHORTNESS OF BREATH: 1
NAUSEA: 0
BLURRED VISION: 0
DOUBLE VISION: 0
PALPITATIONS: 0
CONSTIPATION: 0
FLANK PAIN: 0
BACK PAIN: 0
FALLS: 0
DEPRESSION: 0
BLOOD IN STOOL: 0

## 2019-11-18 NOTE — RESPIRATORY CARE
COPD EDUCATION by COPD CLINICAL EDUCATOR  11/18/2019  at  12:17 PM by Maylin Sullivan     Patient interviewed by COPD education team.  Patient unable to participate in full program.  Short intervention has been conducted.  A comprehensive packet including information about COPD and treatments  Provided and reviewed with her. She quit smoking>1 yr ago. Per [facility] she has COPD and KATHIA overlap syndrome with recent PFT data in EMR (10/9/2019) She has been waiting for her new AVAPS sleep equipment. (BIPAP 12/8 with 3lpm O2 is her last settings) until new equipment arrives. Currently she is using 3 lpm O2 at night. Action Plan completed    COPD Education provided?  yes    Does patient currently use inhalers at home? Yes Breo and Albuterol MDI (spacer provided with teach- back)    Additional medication/equipment recommendations Pt would benefit from a nebulizer and medications at home     Is all Respiratory DME in place? Preferred Sleep and Oxygen equipment                   If yes, has patient been educated on use of DME?   yes    Have all necessary follow up appointments been scheduled?   PCP or D/C clinic [facility] pt to schedule   Specialty-[facility] -Dr Nelson 2/19/2020 @13:20pm    [facility]? yes

## 2019-11-18 NOTE — CARE PLAN
Problem: Bronchoconstriction:  Goal: Improve in air movement and diminished wheezing  Outcome: PROGRESSING AS EXPECTED  Note:   DUO Q4; diminished bilate BS     Problem: Oxygenation:  Goal: Maintain adequate oxygenation dependent on patient condition  Outcome: PROGRESSING AS EXPECTED  Note:   Baseline 2lpm/nc     Problem: Bronchopulmonary Hygiene:  Goal: Increase mobilization of retained secretions  Outcome: PROGRESSING AS EXPECTED  Note:   Flutter QID

## 2019-11-18 NOTE — CARE PLAN
Problem: Safety  Goal: Will remain free from injury  Outcome: PROGRESSING AS EXPECTED  Intervention: Provide assistance with mobility  Note:   Patient ambulates well with standby assistance. Fall precautions in place. Patient calls appropriately for help when needed.      Problem: Bowel/Gastric:  Goal: Normal bowel function is maintained or improved  Outcome: PROGRESSING AS EXPECTED  Intervention: Educate patient and significant other/support system about diet, fluid intake, medications and activity to promote bowel function  Note:   Patient has had 3 Bms today. No loose stools. No complaints of abd pain.

## 2019-11-18 NOTE — ED NOTES
Med rec complete per pt and outside information tab. Unable to get a hold of pharmacy to verify info. On hold for >15 minutes

## 2019-11-18 NOTE — ASSESSMENT & PLAN NOTE
We will check iron studies, B12, folate, reticular site count, TSH  No evidence of acute bleeding  Monitor Hb / Restrictive transfusion strategy

## 2019-11-18 NOTE — PROGRESS NOTES
2 RN SKIN CHECK WITH JC SARGENT.  Patient has rashes under bilateral breasts. Patient rashes to bilateral groin and pannus. Flaky elbows. Bilateral circumferential redness to BLE. Scabbing to anterior RLE.   Devices in place: nasal cannula and purewick. No pressure ulcers under devices. No pressure ulcers noted. Feet floating on pillow. Patient turns self and educated to turn herself in bed.

## 2019-11-18 NOTE — PROGRESS NOTES
[facility] Medicine Daily Progress Note    Date of Service  11/18/2019    Chief Complaint  44 y.o. female admitted 11/17/2019 with shortness of breath     Hospital Course    Patient with underlying history of COPD, morbid obesity, presented with shortness of breath and found to have acute bronchitis and admitted to the telemetry unit      Interval Problem Update  Patient seen and evaluated on rounds  Discussed with nursing staff on rounds  Reports chronic oxygen needs, 2 L at baseline  Slightly more short of breath  Short of breath with minimal exertion  No wheezing on exam  Lower extremity edema  Crackles on auscultation  Concern for fluid of status  Ongoing intermittent cough  No other issues/complaints  No chest pain, no pleuritic symptoms  Presentation not consistent/concerning for venous thrombi embolic disease at this time, DVT duplex has been requested    Consultants/Specialty  None    Code Status  Full code    Disposition  Continue telemetry monitoring, PT/OT for disposition needs  DME for nebulizer ordered per COPD clinical educator    Review of Systems  Review of Systems   Constitutional: Negative for chills, fever and malaise/fatigue.   HENT: Negative for hearing loss and tinnitus.    Eyes: Negative for blurred vision and double vision.   Respiratory: Positive for cough and shortness of breath. Negative for hemoptysis, sputum production and wheezing.    Cardiovascular: Positive for leg swelling. Negative for chest pain, palpitations and PND.   Gastrointestinal: Negative for abdominal pain, blood in stool, constipation, diarrhea, melena, nausea and vomiting.   Genitourinary: Negative for dysuria, flank pain, frequency, hematuria and urgency.   Musculoskeletal: Negative for back pain, falls, joint pain, myalgias and neck pain.   Skin: Negative for itching and rash.   Neurological: Negative for dizziness and headaches.   Psychiatric/Behavioral: Negative for depression and suicidal ideas. The patient is  nervous/anxious.         Physical Exam  Temp:  [36.2 °C (97.2 °F)-37.1 °C (98.8 °F)] 36.2 °C (97.2 °F)  Pulse:  [66-80] 78  Resp:  [18-26] 20  BP: (111-160)/(39-70) 129/70  SpO2:  [94 %-98 %] 95 %    Physical Exam  Constitutional:       Appearance: She is obese.   HENT:      Head: Normocephalic and atraumatic.      Right Ear: External ear normal.      Left Ear: External ear normal.      Nose: Nose normal.      Mouth/Throat:      Mouth: Mucous membranes are moist.   Eyes:      Conjunctiva/sclera: Conjunctivae normal.      Pupils: Pupils are equal, round, and reactive to light.   Neck:      Musculoskeletal: Normal range of motion.   Cardiovascular:      Rate and Rhythm: Normal rate and regular rhythm.      Pulses: Normal pulses.      Heart sounds: Murmur (?) present.   Pulmonary:      Effort: Pulmonary effort is normal. No respiratory distress.      Breath sounds: Rales (minimal) present. No wheezing.   Abdominal:      General: Abdomen is flat. Bowel sounds are normal. There is no distension.      Palpations: Abdomen is soft.      Tenderness: There is no tenderness. There is no guarding.   Musculoskeletal:      Right lower leg: Edema present.      Left lower leg: Edema present.   Skin:     General: Skin is warm.      Capillary Refill: Capillary refill takes less than 2 seconds.   Neurological:      General: No focal deficit present.      Mental Status: She is alert and oriented to person, place, and time. Mental status is at baseline.   Psychiatric:         Mood and Affect: Mood normal.         Behavior: Behavior normal.         Thought Content: Thought content normal.         Judgment: Judgment normal.         Fluids    Intake/Output Summary (Last 24 hours) at 11/18/2019 1222  Last data filed at 11/18/2019 0634  Gross per 24 hour   Intake --   Output 750 ml   Net -750 ml       Laboratory  Recent Labs     11/17/19  1455 11/18/19  0301   WBC 12.8* 9.6   RBC 3.91* 3.52*   HEMOGLOBIN 11.6* 10.4*   HEMATOCRIT 39.1 35.1*    .0* 99.7*   MCH 29.7 29.5   MCHC 29.7* 29.6*   RDW 50.4* 48.7   PLATELETCT 309 248   MPV 11.2 11.3     Recent Labs     11/17/19  1455 11/18/19  0301   SODIUM 141 140   POTASSIUM 4.2 4.4   CHLORIDE 106 107   CO2 28 25   GLUCOSE 89 148*   BUN 14 14   CREATININE 0.99 0.80   CALCIUM 9.9 9.3                   Imaging  DX-CHEST-PORTABLE (1 VIEW)   Final Result      Stable mild cardiomegaly.         EC-ECHOCARDIOGRAM COMPLETE W/O CONT    (Results Pending)   US-EXTREMITY VENOUS LOWER BILAT    (Results Pending)        Assessment/Plan  Acute on chronic respiratory failure with hypoxia (HCC)- (present on admission)  Assessment & Plan  Persistent November 18, 2019    Suspect secondary to a component of fluid overload, underlying pulmonary hypertension, acute bronchitis    Initiating diuresis with Lasix, 20 mg IV twice daily  Monitoring renal function, electro lites with above  Obtain echocardiogram to rule out worsening pulmonary hypertension, other cardiac concerns  Continue management of acute bronchitis with antibiotic therapy  Wean off oxygen as able  Ability desaturation study daily  Interval BNP, chest x-ray tomorrow  LE swelling - check DVT duplex study     Acute bronchitis- (present on admission)  Assessment & Plan  We will treat with 5 days of oral Omnicef and doxycycline    Macrocytic anemia- (present on admission)  Assessment & Plan  We will check iron studies, B12, folate, reticular site count, TSH  No evidence of acute bleeding  Monitor Hb / Restrictive transfusion strategy    Morbid obesity (HCC)- (present on admission)  Assessment & Plan  Body mass index is 53.74 kg/m².    Debility- (present on admission)  Assessment & Plan  PT/OT evaluations   Post acute placement as clinically appropriate     Hx of  Bipolar affective disorder, and schizophrenia- (present on admission)  Assessment & Plan  Continue management with baseline medications  No acute concerns at this time, if any apparent psychiatric  consultation  Otherwise outpatient psychiatric follow-up    COPD (chronic obstructive pulmonary disease) (HCC)- (present on admission)  Assessment & Plan  There is no acute exacerbation  RT protocol       VTE prophylaxis: SC Lovenox

## 2019-11-18 NOTE — FACE TO FACE
Face to Face Note  -  Durable Medical Equipment    Jalen Corona M.D. - NPI: [id number]  I certify that this patient is under my care and that they had a durable medical equipment(DME)face to face encounter by myself that meets the physician DME face-to-face encounter requirements with this patient on:    Date of encounter:   Patient:                    MRN:                       YOB: 2021  Lorene Haro  [id number]  [id number]     The encounter with the patient was in whole, or in part, for the following medical condition, which is the primary reason for durable medical equipment:  COPD    I certify that, based on my findings, the following durable medical equipment is medically necessary:  Nebulizer.    HOME O2 Saturation Measurements:(Values must be present for Home Oxygen orders)         ,     ,         My Clinical findings support the need for the above equipment due to:  Hypoxia, Wheezing (Chronic)

## 2019-11-18 NOTE — H&P
Hospital Medicine History & Physical Note    Date of Service  11/17/2019    Primary Care Physician  iVnce Carranza M.D.    Consultants  nephro    Code Status  full    Chief Complaint  SOB    History of Presenting Illness  44-year-old female with past medical history of dyslipidemia, COPD, psychiatric disorder came in with shortness of breath since this past Friday.  She also complained about cough with yellowish sputum production.  She said that she used 2 L of oxygen at home.  Her symptom continue to get worse and finally she decided to come to ER.  In the ER she was found to have acute bronchitis and patient was started on antibiotic.  She also have leukocytosis as well.  She will be admitted to medical floor for further evaluation.  Review of Systems  Review of Systems   Constitutional: Negative for chills, fever and weight loss.   HENT: Negative for congestion and nosebleeds.    Eyes: Negative for blurred vision, pain, discharge and redness.   Respiratory: Positive for cough, sputum production and shortness of breath. Negative for stridor.    Cardiovascular: Negative for chest pain, palpitations and orthopnea.   Gastrointestinal: Negative for abdominal pain, diarrhea, heartburn, nausea and vomiting.   Genitourinary: Negative for dysuria, frequency and urgency.   Musculoskeletal: Negative for back pain, myalgias and neck pain.   Skin: Negative for itching and rash.   Neurological: Negative for dizziness, focal weakness, seizures and headaches.   Psychiatric/Behavioral: Negative for depression. The patient is not nervous/anxious and does not have insomnia.        Past Medical History   has a past medical history of Asthma, Bipolar 2 disorder (HCC), Chickenpox, Other psychological stress, and Schizophrenic disorder ([facility]). She also has no past medical history of CAD (coronary artery disease), Cancer (HCC), Congestive heart failure (HCC), COPD, Diabetes, Hypertension, Infectious disease, Liver disease, Renal  disorder, Seizure disorder (HCC), or Stroke ([facility]).    Surgical History   has a past surgical history that includes hysterectomy laparoscopy; colectomy; knee arthroscopy; and cholecystectomy.     Family History  family history includes Cancer in her sister; No Known Problems in her mother.     Social History   reports that she quit smoking about 18 months ago. Her smoking use included cigarettes. She has a 20.00 pack-year smoking history. She has never used smokeless tobacco. She reports that she does not drink alcohol or use drugs.    Allergies  Allergies   Allergen Reactions   • Amoxicillin Rash       Medications  Prior to Admission Medications   Prescriptions Last Dose Informant Patient Reported? Taking?   albuterol 108 (90 Base) MCG/ACT Aero Soln inhalation aerosol   No No   Sig: Inhale 2 Puffs by mouth every 6 hours as needed for Shortness of Breath.   aripiprazole (ABILIFY) 30 MG tablet 11/17/2019 at 0700  Yes No   Sig: Take 30 mg by mouth every morning.   doxycycline (VIBRAMYCIN) 100 MG Tab 11/17/2019 at 0700  No No   Sig: Take 1 Tab by mouth 2 times a day for 10 days.      Facility-Administered Medications: None       Physical Exam  Temp:  [37.1 °C (98.8 °F)] 37.1 °C (98.8 °F)  Pulse:  [71-72] 72  Resp:  [22] 22  BP: (160)/(70) 160/70  SpO2:  [96 %-98 %] 98 %    Physical Exam  Vitals signs reviewed.   Constitutional:       General: She is not in acute distress.     Appearance: Normal appearance.   HENT:      Head: Normocephalic and atraumatic.      Nose: No congestion or rhinorrhea.   Eyes:      Extraocular Movements: Extraocular movements intact.      Pupils: Pupils are equal, round, and reactive to light.   Neck:      Musculoskeletal: Normal range of motion and neck supple.   Cardiovascular:      Rate and Rhythm: Normal rate and regular rhythm.      Pulses: Normal pulses.   Pulmonary:      Effort: Pulmonary effort is normal. No respiratory distress.      Breath sounds: Normal breath sounds.   Abdominal:       General: Bowel sounds are normal. There is no distension.      Palpations: Abdomen is soft.      Tenderness: There is no tenderness.   Musculoskeletal:         General: No swelling or tenderness.   Skin:     General: Skin is warm.      Findings: No erythema.   Neurological:      General: No focal deficit present.      Mental Status: She is alert and oriented to person, place, and time.         Laboratory:  Recent Labs     11/17/19  1455   WBC 12.8*   RBC 3.91*   HEMOGLOBIN 11.6*   HEMATOCRIT 39.1   .0*   MCH 29.7   MCHC 29.7*   RDW 50.4*   PLATELETCT 309   MPV 11.2     Recent Labs     11/17/19  1455   SODIUM 141   POTASSIUM 4.2   CHLORIDE 106   CO2 28   GLUCOSE 89   BUN 14   CREATININE 0.99   CALCIUM 9.9     Recent Labs     11/17/19  1455   ALTSGPT 13   ASTSGOT 10*   ALKPHOSPHAT 109*   TBILIRUBIN 1.0   GLUCOSE 89         No results for input(s): NTPROBNP in the last 72 hours.      Recent Labs     11/17/19  1455   TROPONINT 15       Urinalysis:    No results found     Imaging:  DX-CHEST-PORTABLE (1 VIEW)   Final Result      Stable mild cardiomegaly.               Assessment/Plan:  I anticipate this patient is appropriate for observation status at this time.    Acute bronchitis- (present on admission)  Assessment & Plan  Started on antibiotic with IV ceftriaxone and doxycycline  De-escalate antibiotic as needed    COPD (chronic obstructive pulmonary disease) (HCC)- (present on admission)  Assessment & Plan  Continue DuoNeb, albuterol  RT protocol  Stable  On 2 L of home oxygen    Leukocytosis- (present on admission)  Assessment & Plan  Related to above  Antibiotics as above    Hx of  Bipolar affective disorder, and schizophrenia- (present on admission)  Assessment & Plan  Continue outpatient medications      VTE prophylaxis: ambulatory

## 2019-11-18 NOTE — DISCHARGE PLANNING
Received Choice form at [phone]  Agency/Facility Name: [facility]  Referral sent per Choice form @ [phone]

## 2019-11-18 NOTE — ASSESSMENT & PLAN NOTE
Persistent November 18, 2019    Suspect secondary to a component of fluid overload, underlying pulmonary hypertension, acute bronchitis    Initiating diuresis with Lasix, 20 mg IV twice daily  Monitoring renal function, electro lites with above  Obtain echocardiogram to rule out worsening pulmonary hypertension, other cardiac concerns  Continue management of acute bronchitis with antibiotic therapy  Wean off oxygen as able  Ability desaturation study daily  Interval BNP, chest x-ray tomorrow  LE swelling - check DVT duplex study

## 2019-11-18 NOTE — DISCHARGE PLANNING
Patient is eligible for Medicaid Meds to Beds at discharge if they have coverage with [place] Medicaid, Medicaid FFS, Medicaid HMO ([facility]), or [place]. This service is provided through the [facility] if orders are received prior to 4 p.m. Monday through Friday, excluding holidays. Please call [phone] prior to discharge.

## 2019-11-18 NOTE — PROGRESS NOTES
[facility] Health Worker Intake  • Social determinates of health intake complete.   • Identified barriers to none.  • Contact information provided to Lorene Haro.  • Has PCP appointment at [facility].  • Scheduled 11/26 at 10am.    • Referral to JC Wagoner.     11/18. CHW Pushpa met with pt to introduce CCM services. Pt confirmed her pcp is Vince Carranza. Pt indicated follow up appointment on 12/18. No transportation issues getting to medical appointments. Family will be taking her. No trouble paying for resources such as care, and housing. No food insecurity. Support system is family. Pt lives in an apartment with roommate. Pt indicated she is not confident in managing her health after d/c. She is unsure about how to take her medications. Pt would like a nurse to give her a follow up call after d/c.     Plan: Follow up call after d/c.     11/20. Outcome. Spoke to Lorene. 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 The [facility] on [address] [place] [address] does not take Medicaid. They informed me that the [facility] takes Medicaid. I provided contact information for those locations to Lorene. She declined assistance with making an appointment at those locations. Lorene indicated she has a pcp appointment at [facility] on 11/26 at 10am. She forgot the name of the doctor. She indicated no transportation issues getting to the appointment. Lorene indicated her prescriptions was delivered to her yesterday, and she is taking them regularly. She had no further questions regarding how to take them. Lorene indicated she would like to speak to a nurse regarding her health. She is available Thursday-Friday after 3pm. I will refer her to JC Wagoner. Lorene indicated she does not need any other assistance/resources at this time. Lorene met all goals.

## 2019-11-18 NOTE — CARE PLAN
Problem: Bronchoconstriction:  Goal: Improve in air movement and diminished wheezing  Outcome: PROGRESSING AS EXPECTED   Q4H Duoneb, QID FV, Qday Spiriva

## 2019-11-18 NOTE — DISCHARGE PLANNING
RT came in to request Nebulizer for patient. Will request physician to put in order.   Patient is already on [facility], will sent choice to [facility] to add nebulizer to current equipment.   SW faxed choice to [facility] [phone]

## 2019-11-18 NOTE — ED NOTES
Pt in room, on monitor, denies needs at this time, will continue to monitor, awaiting bed assignment

## 2019-11-18 NOTE — PROGRESS NOTES
Bedside shift report completed with night shift RN. Patient resting comfortably. No complaints of pain or sings of distress. Fall precautions in place. Chart reviewed. Will continue to monitor patient and update plan of care.

## 2019-11-18 NOTE — ASSESSMENT & PLAN NOTE
Continue management with baseline medications  No acute concerns at this time, if any apparent psychiatric consultation  Otherwise outpatient psychiatric follow-up

## 2019-11-18 NOTE — DIETARY
NUTRITION SERVICES: BMI - Pt with BMI >40 (=Body mass index is 53.74 kg/m².), morbid obesity. Weight loss counseling not appropriate in acute care setting. RECOMMEND - Referral to outpatient nutrition services for weight management after D/C.

## 2019-11-18 NOTE — DISCHARGE PLANNING
"SW went to patient's room for assessment. Patient is sitting up in her chair, finishing up her phone call. Patient has no visitors at this time.     Patient is a 44-year old single women who lives with a roommate, \"Rea\" in [place], [place]. Patient recently moved in on 11/4 to save money, used to live alone. Patient reports she is disabled due to MH, receives $514.00/month in SS benefits. Patient is unsure why she only receives this amount. Patient does receive SNAP benefit ($190.00/month).     Patient's PCP is Dr. Carranza, also recently was supposed to see a podiatrist to get her toe nails clipped. She reports she will have to reschedule. Patient reports overgrown toenails on L foot. Patient does not currently use walker, cane, or wheelchair. However, reports she frequently is shaky, dizzy, and sometimes falls. SW will request PT/OT evaluation for DME equipment. Patient does use O2 at home, provider is [facility]. Patient does not have AD on file, would like booklet.     Patient denies any issues with alcohol or other drugs. Reports MH history of \"Bipolar and Schizophrenia\" and has taken medications for many years. Patient has psychiatrist at [facility], Dr. Clemente. Does not receive any other OP services at [facility].     Patient is disheveled, shaking hands, reports this is episodic to her, mood is friendly, she is cooperative.    SW to provide AD packet, and request PT/OT referral.     Care Transition Team Assessment    Information Source  Orientation : Oriented x 4  Information Given By: Patient  Who is responsible for making decisions for patient? : Patient    Readmission Evaluation  Is this a readmission?: No    Elopement Risk  Legal Hold: No  Ambulatory or Self Mobile in Wheelchair: Yes  Disoriented: No  Psychiatric Symptoms: None  History of Wandering: No  Elopement this Admit: No  Vocalizing Wanting to Leave: No  Displays Behaviors, Body Language Wanting to Leave: No-Not at Risk for Elopement  Elopement Risk: Not at " Risk for Elopement    Interdisciplinary Discharge Planning  Patient or legal guardian wants to designate a caregiver (see row info): No    Discharge Preparedness  What is your plan after discharge?: Home with help  What are your discharge supports?: Other (comment)(roomate)  Prior Functional Level: Ambulatory, Independent with Activities of Daily Living, Independent with Medication Management  Difficulity with ADLs: Walking  Difficulty with ADLs Comment: Reports shaking, dizzy, and falling at home.  Difficulity with IADLs: None    Functional Assesment  Prior Functional Level: Ambulatory, Independent with Activities of Daily Living, Independent with Medication Management    Finances  Financial Barriers to Discharge: No  Prescription Coverage: Yes    Vision / Hearing Impairment  Vision Impairment : No  Hearing Impairment : No         Advance Directive  Advance Directive?: None  Advance Directive offered?: AD Booklet given    Domestic Abuse  Have you ever been the victim of abuse or violence?: No  Physical Abuse or Sexual Abuse: No  Verbal Abuse or Emotional Abuse: No  Possible Abuse Reported to:: Not Applicable    Psychological Assessment  History of Substance Abuse: None  History of Psychiatric Problems: Yes  Non-compliant with Treatment: No  Newly Diagnosed Illness: Yes    Discharge Risks or Barriers  Discharge risks or barriers?: Other (comment)(DME acceptance)    Anticipated Discharge Information  Anticipated discharge disposition: DME, Home  Discharge Address: ([address] [facility] [place] [id number])  Discharge Contact Phone Number: ([phone])

## 2019-11-19 ENCOUNTER — APPOINTMENT (OUTPATIENT)
Dept: RADIOLOGY | Facility: MEDICAL CENTER | Age: 44
End: 2019-11-19
Attending: INTERNAL MEDICINE
Payer: MEDICAID

## 2019-11-19 ENCOUNTER — PATIENT OUTREACH (OUTPATIENT)
Dept: HEALTH INFORMATION MANAGEMENT | Facility: OTHER | Age: 44
End: 2019-11-19

## 2019-11-19 VITALS
SYSTOLIC BLOOD PRESSURE: 104 MMHG | DIASTOLIC BLOOD PRESSURE: 53 MMHG | WEIGHT: 289.46 LBS | TEMPERATURE: 97.3 F | RESPIRATION RATE: 20 BRPM | HEART RATE: 67 BPM | BODY MASS INDEX: 54.69 KG/M2 | OXYGEN SATURATION: 93 %

## 2019-11-19 LAB
ALBUMIN SERPL BCP-MCNC: 3.9 G/DL (ref 3.2–4.9)
ALBUMIN/GLOB SERPL: 1.4 G/DL
ALP SERPL-CCNC: 82 U/L (ref 30–99)
ALT SERPL-CCNC: 10 U/L (ref 2–50)
ANION GAP SERPL CALC-SCNC: 6 MMOL/L (ref 0–11.9)
AST SERPL-CCNC: 9 U/L (ref 12–45)
BILIRUB SERPL-MCNC: 0.4 MG/DL (ref 0.1–1.5)
BUN SERPL-MCNC: 19 MG/DL (ref 8–22)
CALCIUM SERPL-MCNC: 9.2 MG/DL (ref 8.5–10.5)
CHLORIDE SERPL-SCNC: 108 MMOL/L (ref 96–112)
CO2 SERPL-SCNC: 26 MMOL/L (ref 20–33)
CREAT SERPL-MCNC: 1.01 MG/DL (ref 0.5–1.4)
ERYTHROCYTE [DISTWIDTH] IN BLOOD BY AUTOMATED COUNT: 50.5 FL (ref 35.9–50)
FERRITIN SERPL-MCNC: 35.1 NG/ML (ref 10–291)
FOLATE SERPL-MCNC: 6.8 NG/ML
GLOBULIN SER CALC-MCNC: 2.7 G/DL (ref 1.9–3.5)
GLUCOSE SERPL-MCNC: 105 MG/DL (ref 65–99)
HCT VFR BLD AUTO: 33.9 % (ref 37–47)
HGB BLD-MCNC: 10 G/DL (ref 12–16)
HGB RETIC QN AUTO: 26.4 PG/CELL (ref 29–35)
IMM RETICS NFR: 19.2 % (ref 9.3–17.4)
IRON SATN MFR SERPL: 6 % (ref 15–55)
IRON SERPL-MCNC: 20 UG/DL (ref 40–170)
MAGNESIUM SERPL-MCNC: 2.2 MG/DL (ref 1.5–2.5)
MCH RBC QN AUTO: 29.7 PG (ref 27–33)
MCHC RBC AUTO-ENTMCNC: 29.5 G/DL (ref 33.6–35)
MCV RBC AUTO: 100.6 FL (ref 81.4–97.8)
MORPHOLOGY BLD-IMP: NORMAL
NT-PROBNP SERPL IA-MCNC: 590 PG/ML (ref 0–125)
PHOSPHATE SERPL-MCNC: 3.5 MG/DL (ref 2.5–4.5)
PLATELET # BLD AUTO: 247 K/UL (ref 164–446)
PMV BLD AUTO: 11.4 FL (ref 9–12.9)
POTASSIUM SERPL-SCNC: 4.3 MMOL/L (ref 3.6–5.5)
PROT SERPL-MCNC: 6.6 G/DL (ref 6–8.2)
RBC # BLD AUTO: 3.37 M/UL (ref 4.2–5.4)
RETICS # AUTO: 0.07 M/UL (ref 0.04–0.06)
RETICS/RBC NFR: 2.2 % (ref 0.8–2.1)
SODIUM SERPL-SCNC: 140 MMOL/L (ref 135–145)
TIBC SERPL-MCNC: 326 UG/DL (ref 250–450)
TSH SERPL DL<=0.005 MIU/L-ACNC: 2.32 UIU/ML (ref 0.38–5.33)
VIT B12 SERPL-MCNC: 139 PG/ML (ref 211–911)
WBC # BLD AUTO: 9.8 K/UL (ref 4.8–10.8)

## 2019-11-19 PROCEDURE — 83540 ASSAY OF IRON: CPT

## 2019-11-19 PROCEDURE — G0378 HOSPITAL OBSERVATION PER HR: HCPCS

## 2019-11-19 PROCEDURE — 84100 ASSAY OF PHOSPHORUS: CPT

## 2019-11-19 PROCEDURE — 36415 COLL VENOUS BLD VENIPUNCTURE: CPT

## 2019-11-19 PROCEDURE — 83735 ASSAY OF MAGNESIUM: CPT

## 2019-11-19 PROCEDURE — 85046 RETICYTE/HGB CONCENTRATE: CPT

## 2019-11-19 PROCEDURE — A9270 NON-COVERED ITEM OR SERVICE: HCPCS | Performed by: INTERNAL MEDICINE

## 2019-11-19 PROCEDURE — 82728 ASSAY OF FERRITIN: CPT

## 2019-11-19 PROCEDURE — 80053 COMPREHEN METABOLIC PANEL: CPT

## 2019-11-19 PROCEDURE — 71045 X-RAY EXAM CHEST 1 VIEW: CPT

## 2019-11-19 PROCEDURE — 94760 N-INVAS EAR/PLS OXIMETRY 1: CPT

## 2019-11-19 PROCEDURE — 94668 MNPJ CHEST WALL SBSQ: CPT | Mod: XU

## 2019-11-19 PROCEDURE — 85027 COMPLETE CBC AUTOMATED: CPT

## 2019-11-19 PROCEDURE — 84443 ASSAY THYROID STIM HORMONE: CPT

## 2019-11-19 PROCEDURE — 700102 HCHG RX REV CODE 250 W/ 637 OVERRIDE(OP): Performed by: INTERNAL MEDICINE

## 2019-11-19 PROCEDURE — 96372 THER/PROPH/DIAG INJ SC/IM: CPT

## 2019-11-19 PROCEDURE — 96366 THER/PROPH/DIAG IV INF ADDON: CPT

## 2019-11-19 PROCEDURE — 82746 ASSAY OF FOLIC ACID SERUM: CPT

## 2019-11-19 PROCEDURE — 83550 IRON BINDING TEST: CPT

## 2019-11-19 PROCEDURE — 97161 PT EVAL LOW COMPLEX 20 MIN: CPT

## 2019-11-19 PROCEDURE — 94640 AIRWAY INHALATION TREATMENT: CPT

## 2019-11-19 PROCEDURE — 83880 ASSAY OF NATRIURETIC PEPTIDE: CPT

## 2019-11-19 PROCEDURE — 99217 PR OBSERVATION CARE DISCHARGE: CPT | Performed by: INTERNAL MEDICINE

## 2019-11-19 PROCEDURE — 700101 HCHG RX REV CODE 250: Performed by: INTERNAL MEDICINE

## 2019-11-19 PROCEDURE — 700111 HCHG RX REV CODE 636 W/ 250 OVERRIDE (IP): Performed by: INTERNAL MEDICINE

## 2019-11-19 PROCEDURE — 82607 VITAMIN B-12: CPT

## 2019-11-19 RX ORDER — TIOTROPIUM BROMIDE 18 UG/1
18 CAPSULE ORAL; RESPIRATORY (INHALATION) DAILY
Qty: 30 CAP | Refills: 3 | Status: SHIPPED | OUTPATIENT
Start: 2019-11-20 | End: 2020-02-19 | Stop reason: SDUPTHER

## 2019-11-19 RX ORDER — IPRATROPIUM BROMIDE AND ALBUTEROL SULFATE 2.5; .5 MG/3ML; MG/3ML
3 SOLUTION RESPIRATORY (INHALATION) 4 TIMES DAILY
Qty: 84 ML | Refills: 0 | Status: SHIPPED | OUTPATIENT
Start: 2019-11-19 | End: 2019-11-26

## 2019-11-19 RX ORDER — ACETAMINOPHEN 325 MG/1
650 TABLET ORAL EVERY 6 HOURS PRN
Qty: 30 TAB | Refills: 0 | Status: ON HOLD | OUTPATIENT
Start: 2019-11-19 | End: 2021-01-19

## 2019-11-19 RX ORDER — DOXYCYCLINE 100 MG/1
100 TABLET ORAL EVERY 12 HOURS
Qty: 6 TAB | Refills: 0 | Status: SHIPPED | OUTPATIENT
Start: 2019-11-19 | End: 2019-11-22

## 2019-11-19 RX ORDER — GUAIFENESIN 1200 MG/1
1200 TABLET, EXTENDED RELEASE ORAL 2 TIMES DAILY
Qty: 28 TAB | Refills: 0 | Status: SHIPPED
Start: 2019-11-19 | End: 2020-07-14

## 2019-11-19 RX ORDER — FUROSEMIDE 20 MG/1
20 TABLET ORAL DAILY
Qty: 30 TAB | Refills: 0 | Status: SHIPPED | OUTPATIENT
Start: 2019-11-19 | End: 2021-04-12 | Stop reason: SDUPTHER

## 2019-11-19 RX ORDER — CEFDINIR 300 MG/1
300 CAPSULE ORAL EVERY 12 HOURS
Qty: 6 CAP | Refills: 0 | Status: SHIPPED | OUTPATIENT
Start: 2019-11-19 | End: 2019-11-22

## 2019-11-19 RX ADMIN — ARIPIPRAZOLE 30 MG: 10 TABLET ORAL at 04:56

## 2019-11-19 RX ADMIN — DOXYCYCLINE 100 MG: 100 TABLET, FILM COATED ORAL at 04:57

## 2019-11-19 RX ADMIN — FLUTICASONE PROPIONATE 50 MCG: 50 SPRAY, METERED NASAL at 04:58

## 2019-11-19 RX ADMIN — CEFDINIR 300 MG: 300 CAPSULE ORAL at 04:57

## 2019-11-19 RX ADMIN — IPRATROPIUM BROMIDE AND ALBUTEROL SULFATE 3 ML: .5; 3 SOLUTION RESPIRATORY (INHALATION) at 06:48

## 2019-11-19 RX ADMIN — IPRATROPIUM BROMIDE AND ALBUTEROL SULFATE 3 ML: .5; 3 SOLUTION RESPIRATORY (INHALATION) at 11:23

## 2019-11-19 RX ADMIN — GUAIFENESIN 1200 MG: 600 TABLET, EXTENDED RELEASE ORAL at 04:58

## 2019-11-19 RX ADMIN — FUROSEMIDE 20 MG: 10 INJECTION, SOLUTION INTRAMUSCULAR; INTRAVENOUS at 04:58

## 2019-11-19 RX ADMIN — FAMOTIDINE 20 MG: 20 TABLET ORAL at 04:58

## 2019-11-19 RX ADMIN — ENOXAPARIN SODIUM 40 MG: 100 INJECTION SUBCUTANEOUS at 04:58

## 2019-11-19 RX ADMIN — TIOTROPIUM BROMIDE 1 CAPSULE: 18 CAPSULE ORAL; RESPIRATORY (INHALATION) at 06:49

## 2019-11-19 RX ADMIN — IPRATROPIUM BROMIDE AND ALBUTEROL SULFATE 3 ML: .5; 3 SOLUTION RESPIRATORY (INHALATION) at 14:45

## 2019-11-19 ASSESSMENT — COGNITIVE AND FUNCTIONAL STATUS - GENERAL
SUGGESTED CMS G CODE MODIFIER MOBILITY: CJ
MOVING FROM LYING ON BACK TO SITTING ON SIDE OF FLAT BED: A LITTLE
CLIMB 3 TO 5 STEPS WITH RAILING: A LITTLE
MOBILITY SCORE: 20
MOVING TO AND FROM BED TO CHAIR: A LITTLE
TURNING FROM BACK TO SIDE WHILE IN FLAT BAD: A LITTLE

## 2019-11-19 ASSESSMENT — GAIT ASSESSMENTS
GAIT LEVEL OF ASSIST: SUPERVISED
DISTANCE (FEET): 200
DEVIATION: BRADYKINETIC

## 2019-11-19 ASSESSMENT — PATIENT HEALTH QUESTIONNAIRE - PHQ9
2. FEELING DOWN, DEPRESSED, IRRITABLE, OR HOPELESS: NOT AT ALL
1. LITTLE INTEREST OR PLEASURE IN DOING THINGS: NOT AT ALL
SUM OF ALL RESPONSES TO PHQ9 QUESTIONS 1 AND 2: 0

## 2019-11-19 NOTE — DISCHARGE INSTRUCTIONS
Discharge Instructions    Discharged to home by car with relative. Discharged via wheelchair, hospital escort: Yes.  Special equipment needed: Not Applicable    Be sure to schedule a follow-up appointment with your primary care doctor or any specialists as instructed.     Discharge Plan:   Influenza Vaccine Indication: Not indicated: Previously immunized this influenza season and > 8 years of age    I understand that a diet low in cholesterol, fat, and sodium is recommended for good health. Unless I have been given specific instructions below for another diet, I accept this instruction as my diet prescription.   Other diet: regular    Special Instructions: None    · Is patient discharged on Warfarin / Coumadin?   No       Chronic Obstructive Pulmonary Disease  Chronic obstructive pulmonary disease (COPD) is a common lung problem. In COPD, the flow of air from the lungs is limited. The way your lungs work will probably never return to normal, but there are things you can do to improve your lungs and make yourself feel better. Your doctor may treat your condition with:  · Medicines.  · Oxygen.  · Lung surgery.  · Changes to your diet.  · Rehabilitation. This may involve a team of specialists.  Follow these instructions at home:  · Take all medicines as told by your doctor.  · Avoid medicines or cough syrups that dry up your airway (such as antihistamines) and do not allow you to get rid of thick spit. You do not need to avoid them if told differently by your doctor.  · If you smoke, stop. Smoking makes the problem worse.  · Avoid being around things that make your breathing worse (like smoke, chemicals, and fumes).  · Use oxygen therapy and therapy to help improve your lungs (pulmonary rehabilitation) if told by your doctor. If you need home oxygen therapy, ask your doctor if you should buy a tool to measure your oxygen level (oximeter).  · Avoid people who have a sickness you can catch (contagious).  · Avoid going  outside when it is very hot, cold, or humid.  · Eat healthy foods. Eat smaller meals more often. Rest before meals.  · Stay active, but remember to also rest.  · Make sure to get all the shots (vaccines) your doctor recommends. Ask your doctor if you need a pneumonia shot.  · Learn and use tips on how to relax.  · Learn and use tips on how to control your breathing as told by your doctor. Try:  1. Breathing in (inhaling) through your nose for 1 second. Then, pucker your lips and breath out (exhale) through your lips for 2 seconds.  2. Putting one hand on your belly (abdomen). Breathe in slowly through your nose for 1 second. Your hand on your belly should move out. Pucker your lips and breathe out slowly through your lips. Your hand on your belly should move in as you breathe out.  · Learn and use controlled coughing to clear thick spit from your lungs. The steps are:  1. Lean your head a little forward.  2. Breathe in deeply.  3. Try to hold your breath for 3 seconds.  4. Keep your mouth slightly open while coughing 2 times.  5. Spit any thick spit out into a tissue.  6. Rest and do the steps again 1 or 2 times as needed.  Contact a doctor if:  · You cough up more thick spit than usual.  · There is a change in the color or thickness of the spit.  · It is harder to breathe than usual.  · Your breathing is faster than usual.  Get help right away if:  · You have shortness of breath while resting.  · You have shortness of breath that stops you from:  ¨ Being able to talk.  ¨ Doing normal activities.  · You chest hurts for longer than 5 minutes.  · Your skin color is more blue than usual.  · Your pulse oximeter shows that you have low oxygen for longer than 5 minutes.  This information is not intended to replace advice given to you by your health care provider. Make sure you discuss any questions you have with your health care provider.  Document Released: 06/05/2009 Document Revised: 05/25/2017 Document Reviewed:  "08/14/2014  [employer] Interactive Patient Education © 2017 [employer]      MY COPD ACTION PLAN     It is recommended that patients and physicians /healthcare providers complete this action plan together. This plan should be discussed at each physician visit and updated as needed.    The green, yellow and red zones show groups of symptoms of COPD. This list of symptoms is not comprehensive, and you may experience other symptoms. In the \"Actions\" column, your healthcare provider has recommended actions for you to take based on your symptoms.    Patient Name: Lorene Haro   YOB: 1975   Last Updated on:     Green Zone:  I am doing well today Actions   •  Usual activitiy and exercise level •  Take daily medications   •  Usual amounts of cough and phlegm/mucus •  Use oxygen as prescribed   •  Sleep well at night •  Continue regular exercise/diet plan   •  Appetite is good •  At all times avoid cigarette smoke, inhaled irritants     Daily Medications (these medications are taken every day):                Yellow Zone:  I am having a bad day or a COPD flare Actions   •  More breathless than usual •  Continue daily medications   •  I have less energy for my daily activities •  Use quick relief inhaler as ordered   •  Increased or thicker phlegm/mucus •  Use oxygen as prescribed   •  Using quick relief inhaler/nebulizer more often •  Get plenty of rest   •  Swelling of ankles more than usual •  Use pursed lip breathing   •  More coughing than usual •  At all times avoid cigarette smoke, inhaled irritants   •  I feel like I have a \"chest cold\"   •  Poor sleep and my symptoms woke me up   •  My appetite is not good   •  My medicine is not helping    •  Call provider immediately if symptoms don’t improve     Continue daily medications, add rescue medications:               Medications to be used during a flare up, (as Discussed with Provider):              Red Zone:  I need urgent medical care Actions   •  " Severe shortness of breath even at rest •  Call 911 or seek medical care immediately   •  Not able to do any activity because of breathing    •  Fever or shaking chills    •  Feeling confused or very drowsy     •  Chest pains    •  Coughing up blood          How to Use a Nebulizer, Adult  A nebulizer is a device that turns liquid medicine into a mist (vapor) that you can breathe in (inhale). You may need to use a nebulizer if you have a breathing illness, such as asthma or pneumonia.  There are different kinds of nebulizers. With some, you breathe in through a mouthpiece. With others, a mask fits over your nose and mouth.  Risks and complications  Using a nebulizer that does not fit right or is not cleaned right can lead to the following complications:  · Infection.  · Eye irritation.  · Delivery of too much medicine or not enough medicine.  · Mouth irritation.  How to prepare before using a nebulizer  Take these steps before using your nebulizer:  1. Check your medicine. Make sure it has not  and is not damaged in any way.  2. Wash your hands with soap and water.  3. Put all of the parts of your nebulizer on a sturdy, flat surface. Make sure all of the tubing is connected.  4. Measure the liquid medicine according to instructions from your health care provider. Pour the liquid into the part of the nebulizer that holds the medicine (reservoir).  5. Attach the mouthpiece or mask.  6. Test the nebulizer by turning it on to make sure that a spray comes out. Then, turn it off.  How to use a nebulizer  1. Sit down and relax.  2. If your nebulizer has a mask, put it over your nose and mouth. It should fit somewhat snugly, with no gaps around the nose or cheeks where medicine could escape. If you use a mouthpiece, put it in your mouth. Press your lips firmly around the mouthpiece.  3. Turn on the nebulizer.  4. Breathe out (exhale).  5. Some nebulizers have a finger valve. If yours does, cover up the air hole so the  air gets to the nebulizer.  6. Once the medicine begins to mist out, take slow, deep breaths. If there is a finger valve, release it at the end of your breath.  7. Continue taking slow, deep breaths until the medicine in the nebulizer is gone and no vapor appears.  Be sure to stop the machine at any time if you start coughing or if the medicine foams or bubbles.  How to clean a nebulizer  The nebulizer and all of its parts must be kept very clean. If the nebulizer and its parts are not cleaned properly, bacteria can grow inside of them. If you inhale the bacteria, you can get sick. Follow the 's instructions for cleaning your nebulizer. For most nebulizers, you should follow these guidelines:  · Wash the nebulizer after each use. Use warm water and soap. Make sure to wash the mouthpiece or mask and the medicine area, but do not wash the tubing or mouthpiece.  · After you wash the nebulizer, place its parts on a clean towel and let them dry completely. After they dry, reconnect the pieces and turn the nebulizer on without any medicine in it. Doing this will blow air through the equipment to help dry it out.  · Store the nebulizer in a clean and dust-free place.  · Check the filter at least one time every week. Replace it if it looks dirty.  Contact a health care provider if:  · You continue to have trouble breathing.  · You have trouble using the nebulizer.  · Your breathing gets worse during a nebulizer treatment.  · Your nebulizer stops working, foams, or does not create a mist after you add medicine and turn it on.  This information is not intended to replace advice given to you by your health care provider. Make sure you discuss any questions you have with your health care provider.  Document Released: 12/06/2010 Document Revised: 08/17/2017 Document Reviewed: 06/24/2017  [employer] Interactive Patient Education © 2017 [employer]    Cefdinir capsules  What is this medicine?  CEFDINIR (SEF di ner) is  a cephalosporin antibiotic. It is used to treat certain kinds of bacterial infections. It will not work for colds, flu, or other viral infections.  This medicine may be used for other purposes; ask your health care provider or pharmacist if you have questions.  COMMON BRAND NAME(S): Caden  What should I tell my health care provider before I take this medicine?  They need to know if you have any of these conditions:  -bleeding problems  -kidney disease  -stomach or intestine problems (especially colitis)  -an unusual or allergic reaction to cefdinir, other cephalosporin antibiotics, penicillin, penicillamine, other foods, dyes or preservatives  -pregnant or trying to get pregnant  -breast-feeding  How should I use this medicine?  Take this medicine by mouth. Swallow it with a drink of water. Follow the directions on the prescription label. You can take it with or without food. If it upsets your stomach it may help to take it with food. Take your doses at regular intervals. Do not take it more often than directed. Finish all the medicine you are prescribed even if you think your infection is better.  Talk to your pediatrician regarding the use of this medicine in children. Special care may be needed.  Overdosage: If you think you have taken too much of this medicine contact a poison control center or emergency room at once.  NOTE: This medicine is only for you. Do not share this medicine with others.  What if I miss a dose?  If you miss a dose, take it as soon as you can. If it is almost time for your next dose, take only that dose. Do not take double or extra doses.  What may interact with this medicine?  -antacids that contain aluminum or magnesium  -iron supplements  -other antibiotics  -probenecid  This list may not describe all possible interactions. Give your health care provider a list of all the medicines, herbs, non-prescription drugs, or dietary supplements you use. Also tell them if you smoke, drink  alcohol, or use illegal drugs. Some items may interact with your medicine.  What should I watch for while using this medicine?  Tell your doctor or health care professional if your symptoms do not get better in a few days.  If you are diabetic you may get a false-positive result for sugar in your urine. Check with your doctor or health care professional before you change your diet or the dose of your diabetes medicine.  What side effects may I notice from receiving this medicine?  Side effects that you should report to your doctor or health care professional as soon as possible:  -allergic reactions like skin rash, itching or hives, swelling of the face, lips, or tongue  -bloody or watery diarrhea  -breathing problems  -fever  -redness, blistering, peeling or loosening of the skin, including inside the mouth  -seizures  -trouble passing urine or change in the amount of urine  -unusual bleeding or bruising  -unusually weak or tired  Side effects that usually do not require medical attention (report to your doctor or health care professional if they continue or are bothersome):  -constipation  -diarrhea  -dizziness  -dry mouth  -headache  -loss of appetite  -nausea, vomiting  -stomach pain  -stool discoloration  -tiredness  -vaginal discharge, itching, or odor in women  This list may not describe all possible side effects. Call your doctor for medical advice about side effects. You may report side effects to FDA at 2-219-FDA-4090.  Where should I keep my medicine?  Keep out of the reach of children.  Store at room temperature between 15 and 30 degrees C (59 and 86 degrees F). Throw the medicine away after the expiration date.  NOTE: This sheet is a summary. It may not cover all possible information. If you have questions about this medicine, talk to your doctor, pharmacist, or health care provider.  © 2018 [employer] Standard (2017-04-24 15:52:44)    Doxycycline tablets or capsules  What is this  medicine?  DOXYCYCLINE (dox faye hart) is a tetracycline antibiotic. It kills certain bacteria or stops their growth. It is used to treat many kinds of infections, like dental, skin, respiratory, and urinary tract infections. It also treats acne, Lyme disease, malaria, and certain sexually transmitted infections.  This medicine may be used for other purposes; ask your health care provider or pharmacist if you have questions.  COMMON BRAND NAME(S): Acticlate, Adoxa, Adoxa CK, Adoxa Romario, Adoxa TT, Alodox, Avidoxy, Doxal, Mondoxyne NL, Monodox, Morgidox 1x, Morgidox 1x Kit, Morgidox 2x, Morgidox 2x Kit, NutriDox, Ocudox, TARGADOX, Vibra-Tabs, Vibramycin  What should I tell my health care provider before I take this medicine?  They need to know if you have any of these conditions:  -liver disease  -long exposure to sunlight like working outdoors  -stomach problems like colitis  -an unusual or allergic reaction to doxycycline, tetracycline antibiotics, other medicines, foods, dyes, or preservatives  -pregnant or trying to get pregnant  -breast-feeding  How should I use this medicine?  Take this medicine by mouth with a full glass of water. Follow the directions on the prescription label. It is best to take this medicine without food, but if it upsets your stomach take it with food. Take your medicine at regular intervals. Do not take your medicine more often than directed. Take all of your medicine as directed even if you think you are better. Do not skip doses or stop your medicine early.  Talk to your pediatrician regarding the use of this medicine in children. While this drug may be prescribed for selected conditions, precautions do apply.  Overdosage: If you think you have taken too much of this medicine contact a poison control center or emergency room at once.  NOTE: This medicine is only for you. Do not share this medicine with others.  What if I miss a dose?  If you miss a dose, take it as soon as you can. If  it is almost time for your next dose, take only that dose. Do not take double or extra doses.  What may interact with this medicine?  -antacids  -barbiturates  -birth control pills  -bismuth subsalicylate  -carbamazepine  -methoxyflurane  -other antibiotics  -phenytoin  -vitamins that contain iron  -warfarin  This list may not describe all possible interactions. Give your health care provider a list of all the medicines, herbs, non-prescription drugs, or dietary supplements you use. Also tell them if you smoke, drink alcohol, or use illegal drugs. Some items may interact with your medicine.  What should I watch for while using this medicine?  Tell your doctor or health care professional if your symptoms do not improve.  Do not treat diarrhea with over the counter products. Contact your doctor if you have diarrhea that lasts more than 2 days or if it is severe and watery.  Do not take this medicine just before going to bed. It may not dissolve properly when you lay down and can cause pain in your throat. Drink plenty of fluids while taking this medicine to also help reduce irritation in your throat.  This medicine can make you more sensitive to the sun. Keep out of the sun. If you cannot avoid being in the sun, wear protective clothing and use sunscreen. Do not use sun lamps or tanning beds/booths.  Birth control pills may not work properly while you are taking this medicine. Talk to your doctor about using an extra method of birth control.  If you are being treated for a sexually transmitted infection, avoid sexual contact until you have finished your treatment. Your sexual partner may also need treatment.  Avoid antacids, aluminum, calcium, magnesium, and iron products for 4 hours before and 2 hours after taking a dose of this medicine.  If you are using this medicine to prevent malaria, you should still protect yourself from contact with mosquitos. Stay in screened-in areas, use mosquito nets, keep your body  covered, and use an insect repellent.  What side effects may I notice from receiving this medicine?  Side effects that you should report to your doctor or health care professional as soon as possible:  -allergic reactions like skin rash, itching or hives, swelling of the face, lips, or tongue  -difficulty breathing  -fever  -itching in the rectal or genital area  -pain on swallowing  -redness, blistering, peeling or loosening of the skin, including inside the mouth  -severe stomach pain or cramps  -unusual bleeding or bruising  -unusually weak or tired  -yellowing of the eyes or skin  Side effects that usually do not require medical attention (report to your doctor or health care professional if they continue or are bothersome):  -diarrhea  -loss of appetite  -nausea, vomiting  This list may not describe all possible side effects. Call your doctor for medical advice about side effects. You may report side effects to FDA at 1-311-FDA-4981.  Where should I keep my medicine?  Keep out of the reach of children.  Store at room temperature, below 30 degrees C (86 degrees F). Protect from light. Keep container tightly closed. Throw away any unused medicine after the expiration date. Taking this medicine after the expiration date can make you seriously ill.  NOTE: This sheet is a summary. It may not cover all possible information. If you have questions about this medicine, talk to your doctor, pharmacist, or health care provider.  © 2018 [employer] (2017-01-18 17:11:22)    Furosemide tablets  What is this medicine?  FUROSEMIDE (fyoor [place] se mide) is a diuretic. It helps you make more urine and to lose salt and excess water from your body. This medicine is used to treat high blood pressure, and edema or swelling from heart, kidney, or liver disease.  This medicine may be used for other purposes; ask your health care provider or pharmacist if you have questions.  COMMON BRAND NAME(S): Active-Medicated Specimen Kit,  Delone, Diuscreen, Lasix, RX Specimen Collection Kit, Specimen Collection Kit, URINX Medicated Specimen Collection  What should I tell my health care provider before I take this medicine?  They need to know if you have any of these conditions:  -abnormal blood electrolytes  -diarrhea or vomiting  -gout  -heart disease  -kidney disease, small amounts of urine, or difficulty passing urine  -liver disease  -thyroid disease  -an unusual or allergic reaction to furosemide, sulfa drugs, other medicines, foods, dyes, or preservatives  -pregnant or trying to get pregnant  -breast-feeding  How should I use this medicine?  Take this medicine by mouth with a glass of water. Follow the directions on the prescription label. You may take this medicine with or without food. If it upsets your stomach, take it with food or milk. Do not take your medicine more often than directed. Remember that you will need to pass more urine after taking this medicine. Do not take your medicine at a time of day that will cause you problems. Do not take at bedtime.  Talk to your pediatrician regarding the use of this medicine in children. While this drug may be prescribed for selected conditions, precautions do apply.  Overdosage: If you think you have taken too much of this medicine contact a poison control center or emergency room at once.  NOTE: This medicine is only for you. Do not share this medicine with others.  What if I miss a dose?  If you miss a dose, take it as soon as you can. If it is almost time for your next dose, take only that dose. Do not take double or extra doses.  What may interact with this medicine?  -aspirin and aspirin-like medicines  -certain antibiotics  -chloral hydrate  -cisplatin  -cyclosporine  -digoxin  -diuretics  -laxatives  -lithium  -medicines for blood pressure  -medicines that relax muscles for surgery  -methotrexate  -NSAIDs, medicines for pain and inflammation like ibuprofen, naproxen, or  indomethacin  -phenytoin  -steroid medicines like prednisone or cortisone  -sucralfate  -thyroid hormones  This list may not describe all possible interactions. Give your health care provider a list of all the medicines, herbs, non-prescription drugs, or dietary supplements you use. Also tell them if you smoke, drink alcohol, or use illegal drugs. Some items may interact with your medicine.  What should I watch for while using this medicine?  Visit your doctor or health care professional for regular checks on your progress. Check your blood pressure regularly. Ask your doctor or health care professional what your blood pressure should be, and when you should contact him or her. If you are a diabetic, check your blood sugar as directed.  You may need to be on a special diet while taking this medicine. Check with your doctor. Also, ask how many glasses of fluid you need to drink a day. You must not get dehydrated.  You may get drowsy or dizzy. Do not drive, use machinery, or do anything that needs mental alertness until you know how this drug affects you. Do not stand or sit up quickly, especially if you are an older patient. This reduces the risk of dizzy or fainting spells. Alcohol can make you more drowsy and dizzy. Avoid alcoholic drinks.  This medicine can make you more sensitive to the sun. Keep out of the sun. If you cannot avoid being in the sun, wear protective clothing and use sunscreen. Do not use sun lamps or tanning beds/booths.  What side effects may I notice from receiving this medicine?  Side effects that you should report to your doctor or health care professional as soon as possible:  -blood in urine or stools  -dry mouth  -fever or chills  -hearing loss or ringing in the ears  -irregular heartbeat  -muscle pain or weakness, cramps  -skin rash  -stomach upset, pain, or nausea  -tingling or numbness in the hands or feet  -unusually weak or tired  -vomiting or diarrhea  -yellowing of the eyes or  skin  Side effects that usually do not require medical attention (report to your doctor or health care professional if they continue or are bothersome):  -headache  -loss of appetite  -unusual bleeding or bruising  This list may not describe all possible side effects. Call your doctor for medical advice about side effects. You may report side effects to FDA at 1-031-JUP-6261.  Where should I keep my medicine?  Keep out of the reach of children.  Store at room temperature between 15 and 30 degrees C (59 and 86 degrees F). Protect from light. Throw away any unused medicine after the expiration date.  NOTE: This sheet is a summary. It may not cover all possible information. If you have questions about this medicine, talk to your doctor, pharmacist, or health care provider.  © 2018 Elsevier/Gold Standard (2016-03-09 13:49:50)    Albuterol; Ipratropium solution for inhalation  What is this medicine?  ALBUTEROL; IPRATROPIUM (al BYOO ter ole; i pra TROE pee um) has two bronchodilators. It helps open up the airways in your lungs to make it easier to breathe. This medicine is used to treat chronic obstructive pulmonary disease (COPD).  This medicine may be used for other purposes; ask your health care provider or pharmacist if you have questions.  COMMON BRAND NAME(S): Nikolai  What should I tell my health care provider before I take this medicine?  They need to know if you have any of the following conditions:  -heart disease  -high blood pressure  -irregular heartbeat  -an unusual or allergic reaction to albuterol, ipratropium, atropine, soya protein, soybeans or peanuts, other medicines, foods, dyes, or preservatives  -pregnant or trying to get pregnant  -breast-feeding  How should I use this medicine?  This medicine is used in a nebulizer. Nebulizers make a liquid into an aerosol that you breathe in through your mouth or your mouth and nose into your lungs. You will be taught how to use your nebulizer. Follow the  directions on your prescription label. Take your medicine at regular intervals. Do not use more often than directed.  Talk to your pediatrician regarding the use of this medicine in children. Special care may be needed.  Overdosage: If you think you have taken too much of this medicine contact a poison control center or emergency room at once.  NOTE: This medicine is only for you. Do not share this medicine with others.  What if I miss a dose?  If you miss a dose, use it as soon as you can. If it is almost time for your next dose, use only that dose. Do not use double or extra doses.  What may interact with this medicine?  Do not take this medicine with any of the following medications:  -MAOIs like Carbex, Eldepryl, Marplan, Nardil, and Parnate  This medicine may also interact with the following medications:  -diuretics  -medicines for depression, anxiety, or psychotic disturbances  -medicines for irregular heartbeat  -medicines for weight loss including some herbal products  -methadone  -pimozide  -some medicines for blood pressure or the heart  -sertindole  This list may not describe all possible interactions. Give your health care provider a list of all the medicines, herbs, non-prescription drugs, or dietary supplements you use. Also tell them if you smoke, drink alcohol, or use illegal drugs. Some items may interact with your medicine.  What should I watch for while using this medicine?  Tell your doctor or healthcare professional if your symptoms do not start to get better or if they get worse. If your breathing gets worse while you are using this medicine, call your doctor right away. Do not stop using your medicine unless your doctor tells you to.  Your mouth may get dry. Chewing sugarless gum or sucking hard candy, and drinking plenty of water may help. Contact your doctor if the problem does not go away or is severe.  You may get dizzy or have blurred vision. Do not drive, use machinery, or do anything  that needs mental alertness until you know how this medicine affects you. Do not stand or sit up quickly, especially if you are an older patient. This reduces the risk of dizzy or fainting spells.  What side effects may I notice from receiving this medicine?  Side effects that you should report to your doctor or health care professional as soon as possible:  -allergic reactions like skin rash, itching or hives, swelling of the face, lips, or tongue  -breathing problems  -feeling faint or lightheaded, falls  -fever  -high blood pressure  -irregular heartbeat or chest pain  -muscle cramps or weakness  -pain, tingling, numbness in the hands or feet  -vomiting  Side effects that usually do not require medical attention (report to your doctor or health care professional if they continue or are bothersome):  -blurred vision  -cough  -difficulty passing urine  -difficulty sleeping  -headache  -nervousness or trembling  -stuffy or runny nose  -unusual taste  -upset stomach  This list may not describe all possible side effects. Call your doctor for medical advice about side effects. You may report side effects to FDA at 8-067-FDA-0527.  Where should I keep my medicine?  Keep out of the reach of children.  Store at a room temperature 2 and 30 degrees C (36 to 86 degrees F). Protect from light. Store this medicine in the protective pouch until ready to use. Throw away any unused medicine after the expiration date.  NOTE: This sheet is a summary. It may not cover all possible information. If you have questions about this medicine, talk to your doctor, pharmacist, or health care provider.  © 2018 Elsevier/Gold Standard (2016-12-28 15:59:17)  Guaifenesin oral ER tablets  What is this medicine?  GUAIFENESIN (gwye FEN e sin) is an expectorant. It helps to thin mucous and make coughs more productive. This medicine is used to treat coughs caused by colds or the flu. It is not intended to treat chronic cough caused by smoking, asthma,  emphysema, or heart failure.  This medicine may be used for other purposes; ask your health care provider or pharmacist if you have questions.  COMMON BRAND NAME(S): Humibid, Mucinex  What should I tell my health care provider before I take this medicine?  They need to know if you have any of these conditions:  -fever  -kidney disease  -an unusual or allergic reaction to guaifenesin, other medicines, foods, dyes, or preservatives  -pregnant or trying to get pregnant  -breast-feeding  How should I use this medicine?  Take this medicine by mouth with a full glass of water. Follow the directions on the prescription label. Do not break, chew or crush this medicine. You may take with food or on an empty stomach. Take your medicine at regular intervals. Do not take your medicine more often than directed.  Talk to your pediatrician regarding the use of this medicine in children. While this drug may be prescribed for children as young as 12 years old for selected conditions, precautions do apply.  Overdosage: If you think you have taken too much of this medicine contact a poison control center or emergency room at once.  NOTE: This medicine is only for you. Do not share this medicine with others.  What if I miss a dose?  If you miss a dose, take it as soon as you can. If it is almost time for your next dose, take only that dose. Do not take double or extra doses.  What may interact with this medicine?  Interactions are not expected.  This list may not describe all possible interactions. Give your health care provider a list of all the medicines, herbs, non-prescription drugs, or dietary supplements you use. Also tell them if you smoke, drink alcohol, or use illegal drugs. Some items may interact with your medicine.  What should I watch for while using this medicine?  Do not treat a cough for more than 1 week without consulting your doctor or health care professional. If you also have a high fever, skin rash, continuing  headache, or sore throat, see your doctor.  For best results, drink 6 to 8 glasses water daily while you are taking this medicine.  What side effects may I notice from receiving this medicine?  Side effects that you should report to your doctor or health care professional as soon as possible:  -allergic reactions like skin rash, itching or hives, swelling of the face, lips, or tongue  Side effects that usually do not require medical attention (report to your doctor or health care professional if they continue or are bothersome):  -dizziness  -headache  -stomach upset  This list may not describe all possible side effects. Call your doctor for medical advice about side effects. You may report side effects to FDA at 0-305-FDA-9091.  Where should I keep my medicine?  Keep out of the reach of children.  Tiotropium inhalation powder  What is this medicine?  TIOTROPIUM (drea oh TRO pee um) is a bronchodilator. It helps open up the airways in your lungs to make it easier to breathe. This medicine is used to treat chronic obstructive pulmonary disease (COPD), including emphysema and chronic bronchitis. Do not use this medicine for an acute attack.  This medicine may be used for other purposes; ask your health care provider or pharmacist if you have questions.  COMMON BRAND NAME(S): Spiriva HandiHaler  What should I tell my health care provider before I take this medicine?  They need to know if you have any of these conditions:  -bladder problems or difficulty passing urine  -glaucoma  -kidney disease  -prostate trouble  -an unusual or allergic reaction to tiotropium, ipratropium, atropine, other medicines, lactose, foods, dyes, or preservatives  -pregnant or trying to get pregnant  -breast-feeding  How should I use this medicine?  This medicine is used in a special inhaler. Do NOT swallow the capsules. Do NOT use a spacer device. Follow the directions on the prescription label. Take your medicine at regular intervals. Do not  take it more often than directed. Do not stop taking except on your doctor's advice. Make sure that you are using your inhaler correctly. Ask your doctor or health care provider if you have any questions. Small pieces of the capsule may get in your mouth or throat when you breathe in your medicine. This is normal and should not hurt you.  Talk to your pediatrician regarding the use of this medicine in children. Special care may be needed.  Overdosage: If you think you have taken too much of this medicine contact a poison control center or emergency room at once.  NOTE: This medicine is only for you. Do not share this medicine with others.  What if I miss a dose?  If you miss a dose, use it as soon as you can. If it is almost time for your next dose, use only that dose and continue with your regular schedule. Do not use double or extra doses.  What may interact with this medicine?  This medicine may also interact with the following medications:  -atropine  -antihistamines for allergy, cough and cold  -certain medicines for bladder problems like oxybutynin, tolterodine  -certain medicines for stomach problems like dicyclomine, hyoscyamine  -certain medicines for travel sickness like scopolamine  -certain medicines for Parkinson's disease like benztropine, trihexyphenidyl  -ipratropium  This list may not describe all possible interactions. Give your health care provider a list of all the medicines, herbs, non-prescription drugs, or dietary supplements you use. Also tell them if you smoke, drink alcohol, or use illegal drugs. Some items may interact with your medicine.  What should I watch for while using this medicine?  Visit your doctor or health care professional for regular checks on your progress. Tell your doctor if your symptoms do not improve. Do not use more medicine than directed. If your symptoms get worse while you are using this medicine, call your doctor right away.  Do not get the this medicine in your  eyes. It can cause irritation, pain, or blurred vision.  You may get dizzy. Do not drive, use machinery, or do anything that needs mental alertness until you know how this medicine affects you. Do not stand or sit up quickly, especially if you are an older patient. This reduces the risk of dizzy or fainting spells.  Clean the inhaler as directed in the patient information sheet that comes with this medicine.  What side effects may I notice from receiving this medicine?  Side effects that you should report to your doctor or health care professional as soon as possible:  -allergic reactions like skin rash or hives, swelling of the face, lips, or tongue  -breathing problems  -changes in vision  -chest pain  -fast heartbeat  -infection or flu-like symptoms  -trouble passing urine or change in the amount of urine  Side effects that usually do not require medical attention (report to your doctor or health care professional if they continue or are bothersome):  -constipation  -cough  -dizziness  -dry mouth  -headache  -muscle pain  -sore throat  -stomach upset  This list may not describe all possible side effects. Call your doctor for medical advice about side effects. You may report side effects to FDA at 5-717-FDA-0312.  Where should I keep my medicine?  Keep out of the reach of children.  Store at room temperature between 15 and 30 degrees C (59 and 86 degrees F). Protect from humidity. Keep capsules in the foil pack until you are ready to use. Throw away any unused medicine after the expiration date.  NOTE: This sheet is a summary. It may not cover all possible information. If you have questions about this medicine, talk to your doctor, pharmacist, or health care provider.  © 2018 Elsevier/Gold Standard (2015-09-30 13:19:07)  Store at room temperature between 20 and 25 degrees C (68 and 77 degrees F). Keep container tightly closed. Throw away any unused medicine after the expiration date.  NOTE: This sheet is a  summary. It may not cover all possible information. If you have questions about this medicine, talk to your doctor, pharmacist, or health care provider.  © 2018 Elsevier/Gold Standard (2009-04-29 12:14:14)          Depression / Suicide Risk    As you are discharged from this [facility] facility, it is important to learn how to keep safe from harming yourself.    Recognize the warning signs:  · Abrupt changes in personality, positive or negative- including increase in energy   · Giving away possessions  · Change in eating patterns- significant weight changes-  positive or negative  · Change in sleeping patterns- unable to sleep or sleeping all the time   · Unwillingness or inability to communicate  · Depression  · Unusual sadness, discouragement and loneliness  · Talk of wanting to die  · Neglect of personal appearance   · Rebelliousness- reckless behavior  · Withdrawal from people/activities they love  · Confusion- inability to concentrate     If you or a loved one observes any of these behaviors or has concerns about self-harm, here's what you can do:  · Talk about it- your feelings and reasons for harming yourself  · Remove any means that you might use to hurt yourself (examples: pills, rope, extension cords, firearm)  · Get professional help from the community (Mental Health, Substance Abuse, psychological counseling)  · Do not be alone:Call your Safe Contact- someone whom you trust who will be there for you.  · Call your local CRISIS HOTLINE 064-7197 or 128-819-5127  · Call your local Children's Mobile Crisis Response Team [place] (740) 852-6025 or www.[employer]  · Call the toll free National Suicide Prevention Hotlines   · National Suicide Prevention Lifeline 806-558-XKBD (6309)  · National Hope Line Network 800-SUICIDE (886-3823)  Discharge Instructions per Raul Callahan M.D.    DIET: Resume previous diet  Healthy diet. Plenty of vegetables.    ACTIVITY: Avoid strenuous activity or heavy  lifting.     DIAGNOSIS:   Patient Active Problem List   Diagnosis   • COPD (chronic obstructive pulmonary disease) (HCC)   • Hx of  Bipolar affective disorder, and schizophrenia   • Cardiomegaly   • Class 3 severe obesity due to excess calories with body mass index (BMI) of 50.0 to 59.9 in adult ([facility])   • KATHIA and COPD overlap syndrome ([facility])   • Iron deficiency anemia   • Acute bronchitis   • Acute on chronic respiratory failure with hypoxia ([facility])   • Debility   • Morbid obesity ([facility])   • Macrocytic anemia       Return to ER in the event of new or worsening symptoms. Please note importance of compliance and the patient has agreed to proceed with all medical recommendations and follow up plan indicated above. All medications come with benefits and risks. Risks may include permanent injury or death and these risks can be minimized with close reassessment and monitoring. Please make it to your scheduled follow ups with Vince Carranza M.D., and/or specialists clinic. [facility] primary provider and Pulmonology.  Need to see primary provider. You weill be on Lasix and will need labs to check Cr- and K

## 2019-11-19 NOTE — DISCHARGE PLANNING
Agency/Facility Name: [facility]  Spoke To: Morgan  Outcome: Accepted and was delivered yesterday around 1700.  SW informed.

## 2019-11-19 NOTE — CARE PLAN
Problem: Communication  Goal: The ability to communicate needs accurately and effectively will improve  Outcome: MET     Problem: Safety  Goal: Will remain free from injury  Outcome: MET  Goal: Will remain free from falls  11/19/2019 1507 by Sara Carvajal R.N.  Outcome: MET  11/19/2019 1043 by Sara Carvajal R.N.  Outcome: PROGRESSING AS EXPECTED  Intervention: Assess risk factors for falls  Note:   Patient ambulating well with no assistance. Fall precautions in place. Calls appropriately for help when needed.      Problem: Infection  Goal: Will remain free from infection  Outcome: MET     Problem: Venous Thromboembolism (VTW)/Deep Vein Thrombosis (DVT) Prevention:  Goal: Patient will participate in Venous Thrombosis (VTE)/Deep Vein Thrombosis (DVT)Prevention Measures  Outcome: MET     Problem: Bowel/Gastric:  Goal: Normal bowel function is maintained or improved  Outcome: MET  Goal: Will not experience complications related to bowel motility  Outcome: MET     Problem: Knowledge Deficit  Goal: Knowledge of disease process/condition, treatment plan, diagnostic tests, and medications will improve  Outcome: MET  Goal: Knowledge of the prescribed therapeutic regimen will improve  Outcome: MET     Problem: Discharge Barriers/Planning  Goal: Patient's continuum of care needs will be met  Outcome: MET     Problem: Respiratory:  Goal: Respiratory status will improve  11/19/2019 1507 by Sara Carvajal R.N.  Outcome: MET  11/19/2019 1043 by Sara Carvajal R.N.  Outcome: PROGRESSING AS EXPECTED  Intervention: Assess and monitor pulmonary status  Note:   Patient states she is feeling much better. Oxygen walk test done today. Patient back to baseline O2 requirements.      Problem: Pain Management  Goal: Pain level will decrease to patient's comfort goal  Outcome: MET     Problem: Psychosocial Needs:  Goal: Level of anxiety will decrease  Outcome: MET

## 2019-11-19 NOTE — THERAPY
"Physical Therapy Evaluation completed.   Bed Mobility:  Supine to Sit: Modified Independent  Transfers: Sit to Stand: Supervised  Gait: Level Of Assist: Supervised with No Equipment Needed       Plan of Care: Patient with no further skilled PT needs in the acute care setting at this time  Discharge Recommendations: Equipment: No Equipment Needed. Post-acute therapy Currently anticipate no further skilled therapy needs once patient is discharged from the inpatient setting.    See \"Rehab Therapy-Acute\" Patient Summary Report for complete documentation.     "

## 2019-11-19 NOTE — FLOWSHEET NOTE
11/18/19 1911   Type of Assessment   Reassessment Yes   Chest Exam   Work Of Breathing / Effort Mild   Respiration 18   Pulse 68   Breath Sounds   Pre/Post Intervention Pre Intervention Assessment   RUL Breath Sounds Diminished   RML Breath Sounds Diminished   RLL Breath Sounds Diminished   BERNARDO Breath Sounds Diminished   LLL Breath Sounds Diminished   Secretions   Cough Productive   Sputum Amount Unable to Evaluate   Sputum Color Unable to Evaluate   Sputum Consistency Unable to Evaluate   Oximetry   Continuous Oximetry Yes   Oxygen   Pulse Oximetry 100 %   O2 (LPM) 3   O2 Daily Delivery Respiratory  Silicone Nasal Cannula   Bronchodilator Protocol   Is this an exacerbation of COPD/Asthma? Yes - Reassessment, QID for 24 hours   Bronchodilator Indications History / Diagnosis   Breath Sounds Criteria 1    Benefit Criteria 1    Pulse Criteria 0    Respiratory Rate Criteria 1    S.O.B. Criteria 1    Criteria Total 4   Point Values 4-6 - QID and PRN   Bronchodilator Goals/Outcome Improved Vital Signs and Measures of Gas Exchange

## 2019-11-19 NOTE — CARE PLAN
Problem: Safety  Goal: Will remain free from falls  Outcome: PROGRESSING AS EXPECTED  Intervention: Assess risk factors for falls  Note:   Patient ambulating well with no assistance. Fall precautions in place. Calls appropriately for help when needed.      Problem: Respiratory:  Goal: Respiratory status will improve  Outcome: PROGRESSING AS EXPECTED  Intervention: Assess and monitor pulmonary status  Note:   Patient states she is feeling much better. Oxygen walk test done today. Patient back to baseline O2 requirements.

## 2019-11-19 NOTE — FLOWSHEET NOTE
11/18/19 1911   Events/Summary/Plan   Events/Summary/Plan pt admitted 11/17, SVN reassessed   Interdisciplinary Plan of Care-Goals (Indications)   Bronchodilator Indications History / Diagnosis   Interdisciplinary Plan of Care-Outcomes    Bronchodilator Outcome Improved Patient Appearance with Decreased use of Accessory Muscles   Education   Education Yes - Pt. / Family has been Instructed in use of Respiratory Equipment   RT Assessment of Delivered Medications   Evaluation of Medication Delivery Daily Yes-- Pt /Family has been Instructed in use of Respiratory Medications and Adverse Reactions   SVN Group   #SVN Performed Yes   Given By: Mouthpiece   Respiratory WDL   Respiratory (WDL) X   Chest Exam   Work Of Breathing / Effort Mild   Respiration 18   Pulse 68   Breath Sounds   Pre/Post Intervention Pre Intervention Assessment   RUL Breath Sounds Diminished   RML Breath Sounds Diminished   RLL Breath Sounds Diminished   BERNARDO Breath Sounds Diminished   LLL Breath Sounds Diminished   Secretions   Cough Productive   How Sputum Obtained Spontaneous   Sputum Amount Unable to Evaluate   Sputum Color Unable to Evaluate   Sputum Consistency Unable to Evaluate   Oximetry   Continuous Oximetry Yes   Oxygen   Pulse Oximetry 100 %   O2 (LPM) 3   O2 Daily Delivery Respiratory  Silicone Nasal Cannula

## 2019-11-19 NOTE — CARE PLAN
Respiratory Update    Treatment modality: SVN/Flutter  Frequency: QID    Pt tolerating current treatments well with no adverse reactions.

## 2019-11-20 NOTE — PROGRESS NOTES
Patient AOx4. Mom at bedside. All discharge instructions reviewed with mom and patient. Both verbalize agreement to all follow ups, medications, and other instructions. Meds delivered to bedside and reviewed with patient. Nebulizer reviewed with patient. IV removed. Belongings returned. No complaints of pain or signs of distress upon discharge. Patient has O2 at home. No SOB at discharge. Patient states she feels safe going home. Patient taken down in wheelchair by CNA.

## 2019-11-20 NOTE — DISCHARGE PLANNING
Medication reconcilliation completed. Medications delivered to patient at bedside. Patient counseled.       TahirLorene Bonnie   Home Medication Instructions TRACIE:[id number]    Printed on:11/19/19 1217   Medication Information                      acetaminophen (TYLENOL) 325 MG Tab  Take 2 Tabs by mouth every 6 hours as needed (Mild Pain; (Pain scale 1-3); Temp greater than 100.5 F).             cefdinir (OMNICEF) 300 MG Cap  Take 1 Cap by mouth every 12 hours for 3 days.             doxycycline monohydrate (ADOXA) 100 MG tablet  Take 1 Tab by mouth every 12 hours for 3 days.             furosemide (LASIX) 20 MG Tab  Take 1 Tab by mouth every day.             ipratropium-albuterol (DUONEB) 0.5-2.5 (3) MG/3ML nebulizer solution  3 mL by Nebulization route 4 times a day for 7 days.             tiotropium (SPIRIVA) 18 MCG Cap  Inhale 1 Cap by mouth every day.

## 2019-11-20 NOTE — DISCHARGE SUMMARY
Discharge Summary    CHIEF COMPLAINT ON ADMISSION  Chief Complaint   Patient presents with   • Shortness of Breath   • Cough       Reason for Admission  Sent by MD; Blood Work     Admission Date  11/17/2019    CODE STATUS  Full Code    HPI & HOSPITAL COURSE  This is a 44 y.o. female here with Shortness of Breath and Cough    Please review Dr. Lico Mcclain M.D. notes for further details of history of present illness, past medical/social/family histories, allergies and medications.  History of COPD on chronic as needed O2  Presents with Shortness of Breath and Cough  Afebrile, slightly hypertensive.  CXR pulmonary edema.  Echo normal EF, normal LV and RV, but elevated RV pressure, no change from previous.  LE Dopplers bilateral edema. No DVTs  She was given Lasix IV. She was started on antibiotics for bronchitis. She did improve. This was transitioned to PO. She wanted to go home. PT/OT came and saw her.  They recommended home, no skilled needed. Nursing also checked her O2 need and she was above 90% at rest and will be discharged on her O2. She can follow up with her primary provider and (or be referred to Pulmonology/PMA.    Her B12 was slightly low. She can get B12 supplements over the counter. Further work-up can be managed by her primary care. Her blood pressure improved with the diuretic and it was actually in the low side of normal. Advised Lorene Haro to check blood pressure at home at least twice a day and have a log for primary provider to review.     At discharge date, Lorene Haro afebrile and hemodynamically stable.  Lorene Haro wanted to be discharged today.    Discharge Physical Exam  Constitutional:       Appearance: She is obese.   HENT:      Head: Normocephalic and atraumatic.      Right Ear: External ear normal.      Left Ear: External ear normal.      Nose: Nose normal.      Mouth/Throat:      Mouth: Mucous membranes are moist.   Eyes:      Conjunctiva/sclera: Conjunctivae normal.       Pupils: Pupils are equal, round, and reactive to light.   Neck:      Musculoskeletal: Normal range of motion.   Cardiovascular:      Rate and Rhythm: Normal rate and regular rhythm.      Pulses: Normal pulses.      Heart sounds: Murmur (?) present.   Pulmonary:      Effort: Pulmonary effort is normal. No respiratory distress.      Breath sounds: Rales (minimal) present. No wheezing.   Abdominal:      General: Abdomen is flat. Bowel sounds are normal. There is no distension.      Palpations: Abdomen is soft.      Tenderness: There is no tenderness. There is no guarding.   Musculoskeletal:      Right lower leg: Edema present.      Left lower leg: Edema present.   Skin:     General: Skin is warm.      Capillary Refill: Capillary refill takes less than 2 seconds.   Neurological:      General: No focal deficit present.      Mental Status: She is alert and oriented to person, place, and time. Mental status is at baseline.   Psychiatric:         Mood and Affect: Mood normal.         Behavior: Behavior normal.         Thought Content: Thought content normal.         Judgment: Judgment normal.        Imaging  DX-CHEST-LIMITED (1 VIEW)   Final Result         1.  Pulmonary edema and/or infiltrates are identified, which are stable since the prior exam.   2.  Cardiomegaly      US-EXTREMITY VENOUS LOWER BILAT   Final Result      EC-ECHOCARDIOGRAM COMPLETE W/O CONT   Final Result      DX-CHEST-PORTABLE (1 VIEW)   Final Result      Stable mild cardiomegaly.                         Therefore, she is discharged in fair and stable condition to home with close outpatient follow-up.      Discharge Date  11/19/2019    FOLLOW UP ITEMS POST DISCHARGE  Need primary provider and pulmonary referral or follow-up    DISCHARGE DIAGNOSES  Active Problems:    Acute on chronic respiratory failure with hypoxia (HCC) POA: Yes    Acute bronchitis POA: Yes    COPD (chronic obstructive pulmonary disease) ([facility]) POA:    Elveted RV pressure Yes    Hx  of  Bipolar affective disorder, and schizophrenia POA: Yes    Debility POA: Yes    Morbid obesity (HCC) POA: Yes    Macrocytic anemia POA: Yes  Resolved Problems:    * No resolved hospital problems. *      FOLLOW UP  Future Appointments   Date Time Provider Department Center   2/19/2020  1:20 PM Isa Meraz M.D. [facility] None     [address] [id number] [phone]    Please call or walk in to schedule an appointment to establish care with a new primary care provider. Thank you.     [facility]  [address]  [place] [address]  [phone]    Please call to schedule an appointment to establish care with a new primary care provider. Please get a referral for Pulmonary.  Thank you.     Follow up with Vince Carranza M.D. in 1 week. Need to check labs as you are on Lasix. No K supplementation as K has been above 4 on Lasix.  Folow up or be referred to Pulmonology/PMA in 1 week, defer need for outpatient sleep study, spirometry and PFTs.    MEDICATIONS ON DISCHARGE     Medication List      START taking these medications      Instructions   acetaminophen 325 MG Tabs  Commonly known as:  TYLENOL   Take 2 Tabs by mouth every 6 hours as needed (Mild Pain; (Pain scale 1-3); Temp greater than 100.5 F).  Dose:  650 mg     cefdinir 300 MG Caps  Commonly known as:  OMNICEF   Take 1 Cap by mouth every 12 hours for 3 days.  Dose:  300 mg     cyanocobalamin 1000 MCG Tabs  Commonly known as:  VITAMIN B12   Take 1 Tab by mouth every day.  Dose:  1,000 mcg     doxycycline monohydrate 100 MG tablet  Commonly known as:  ADOXA   Take 1 Tab by mouth every 12 hours for 3 days.  Dose:  100 mg     furosemide 20 MG Tabs  Commonly known as:  LASIX   Take 1 Tab by mouth every day.  Dose:  20 mg     Guaifenesin 1200 MG Tb12   Take 1 Tab by mouth 2 Times a Day.  Dose:  1,200 mg     ipratropium-albuterol 0.5-2.5 (3) MG/3ML nebulizer solution  Commonly known as:   DUONEB   Doctor's comments:  After which, PRN  3 mL by Nebulization route 4 times a day for 7 days.  Dose:  3 mL     tiotropium 18 MCG Caps  Start taking on:  November 20, 2019  Commonly known as:  SPIRIVA   Inhale 1 Cap by mouth every day.  Dose:  18 mcg        CONTINUE taking these medications      Instructions   ABILIFY 30 MG tablet  Generic drug:  aripiprazole   Take 30 mg by mouth every morning.  Dose:  30 mg     albuterol 108 (90 Base) MCG/ACT Aers inhalation aerosol   Inhale 2 Puffs by mouth every 6 hours as needed for Shortness of Breath.  Dose:  2 Puff     atorvastatin 20 MG Tabs  Commonly known as:  LIPITOR   Take 20 mg by mouth every bedtime.  Dose:  20 mg     benztropine 1 MG Tabs  Commonly known as:  COGENTIN   Take 1 mg by mouth every bedtime.  Dose:  1 mg     fluticasone 50 MCG/ACT nasal spray  Commonly known as:  FLONASE   Spray 1 Spray in nose 2 Times a Day.  Dose:  1 Spray     haloperidol 5 MG Tabs  Commonly known as:  HALDOL   Take 5 mg by mouth every bedtime.  Dose:  5 mg     hydrOXYzine HCl 25 MG Tabs  Commonly known as:  ATARAX   Take 25 mg by mouth 2 times a day as needed.  Dose:  25 mg     lithium  MG Tbcr  Commonly known as:  LITHOBID   Take 300-600 mg by mouth 2 Times a Day. 300 mg in AM and 600 mg at bedtime  Dose:  300-600 mg     montelukast 10 MG Tabs  Commonly known as:  SINGULAIR   Take 10 mg by mouth every bedtime.  Dose:  10 mg     raNITidine 150 MG Tabs  Commonly known as:  ZANTAC   Take 150 mg by mouth every day.  Dose:  150 mg     triamcinolone acetonide 0.5 % Crea  Commonly known as:  KENALOG   Apply 1 Application to affected area(s) 1 time daily as needed. For  itching  Dose:  1 Application        STOP taking these medications    doxycycline 100 MG Tabs  Commonly known as:  VIBRAMYCIN            Allergies  Allergies   Allergen Reactions   • Amoxicillin Rash       DIET  Orders Placed This Encounter   Procedures   • Diet Order Regular     Standing Status:   Standing      Number of Occurrences:   1     Order Specific Question:   Diet:     Answer:   Regular [1]       ACTIVITY  No heavy lifting  No strenuous activity    CONSULTATIONS      PROCEDURES  Dx-chest-2 Views    Result Date: 11/17/2019 11/17/2019 11:27 AM HISTORY/REASON FOR EXAM:  Productive cough. TECHNIQUE/EXAM DESCRIPTION AND NUMBER OF VIEWS: Two views of the chest. COMPARISON:  11/15/2018 FINDINGS: Cardiac silhouette is mildly enlarged. Mild perihilar opacifications are noted which are less prominent than on prior radiograph. No pulmonary infiltrates or consolidations are noted. No pleural effusions are appreciated.     1.  No infiltrates or consolidations identified. 2.  Mild cardiomegaly and perihilar congestion.    Dx-chest-limited (1 View)    Result Date: 11/19/2019 11/19/2019 5:59 AM HISTORY/REASON FOR EXAM: interval change TECHNIQUE/EXAM DESCRIPTION:  Single AP view of the chest. COMPARISON: November 17, 2019 FINDINGS: Overlying cardiac leads are present. Cardiomegaly is observed. The mediastinal contour appears within normal limits.  The central  pulmonary vasculature appears prominent and indistinct. The lungs appear well expanded bilaterally.  Diffuse scattered hazy pulmonary parenchymal opacities are seen. No significant pleural effusions are identified. The bony structures appear age-appropriate.     1.  Pulmonary edema and/or infiltrates are identified, which are stable since the prior exam. 2.  Cardiomegaly    Dx-chest-portable (1 View)    Result Date: 11/17/2019 11/17/2019 3:22 PM HISTORY/REASON FOR EXAM:  Chest Pain. TECHNIQUE/EXAM DESCRIPTION AND NUMBER OF VIEWS: Single portable view of the chest. COMPARISON: 11/17/2019 FINDINGS: The cardiomediastinal silhouette is stable. No focal consolidation, pleural effusion or pneumothorax is identified.  Costophrenic angles are clear. Calcifications adjacent to the right humeral head can be seen in the setting of calcific tendinopathy.     Stable mild  cardiomegaly.     Us-extremity Venous Lower Bilat    Result Date: 2019   Vascular Laboratory  CONCLUSIONS  Bilateral edema noted.  Suspected elevated right heart pressure.  No prior study is available for comparison.  No other deep or superficial venous abnormalities in either leg.  ADELINA MILLAN  Exam Date:     2019 19:24  Room #:     Inpatient  Priority:     Routine  Ht (in):             Wt (lb):  Ordering Physician:        ROSEMARY MONTOYA  Referring Physician:       JAN Staley  Sonographer:               Barrington Hummel RDMS  Study Type:                Complete Bilateral  Technical Quality:         Adequate  Age:    44    Gender:     F  MRN:    [id number]  :    1975      BSA:  Indications:     Localized swelling, mass and lump, unspecified lower limb  CPT Codes:       72154  ICD Codes:       R22.40  History:         No Previous LEV, Bilateral lower extremity swelling/edema  Limitations:  PROCEDURES:  Bilateral lower extremity venous duplex imaging.  The following venous structures were evaluated: common femoral vein,  profunda vein, proximal portion of the greater saphenous vein, superficial  femoral vein, and the popliteal vein.  In addition, the posterior tibial, and peroneal veins were evaluated.  Serial compression, augmentation maneuvers, and spectral Doppler flow  evaluation were performed.  FINDINGS:  Bilateral lower extremities -.  Complete color filling and compressibility with pulsatile venous flow was  observed consistent with elevated right heart pressure.  Edema reduces the image quality.  The peroneal and posterior tibial veins are difficult to assess for  compressibility, but flow response to augmentation is demonstrated.  No evidence of superficial or deep venous thrombosis.  Adelaida Posada M.D.  (Electronically Signed)  Final Date:      2019                   23:39    Ec-echocardiogram Complete W/o Cont    Result Date: 2019  Transthoracic Echo Report  Echocardiography Laboratory CONCLUSIONS Compared to the images of the study done on 2018 - there has been no significant change. Normal left ventricular systolic function. Left ventricular ejection fraction is visually estimated to be 60%. Normal diastolic function. Normal right ventricular systolic function. Mild mitral regurgitation. Moderate tricuspid regurgitation. Right ventricular systolic pressure is estimated to be 65 mmHg. ADELINA MILLAN Exam Date:         2019                    16:40 Exam Location:     Inpatient Priority:          Routine Ordering Physician:        ROSEMARY MONTOYA Referring Physician:       JAN Staley Sonographer:               Cruz Salguero RDCS, RVT Age:    44     Gender:    F MRN:    [id number] :    1975 BSA:    2.23   Ht (in):    62     Wt (lb):    288 Exam Type:     Complete Indications:     Dyspnea ICD Codes:       786.09 CPT Codes:       80397 BP:   129    /   70     HR:   75 Technical Quality:       Technically difficult study -                          adequate information is obtained MEASUREMENTS  (Male / Female) Normal Values 2D ECHO LV Diastolic Diameter PLAX        5.1 cm                4.2 - 5.9 / 3.9 - 5.3 cm LV Systolic Diameter PLAX         3.8 cm                2.1 - 4.0 cm IVS Diastolic Thickness           1.2 cm                LVPW Diastolic Thickness          1.2 cm                LVOT Diameter                     2 cm                  Estimated LV Ejection Fraction    60 %                  LV Ejection Fraction MOD BP       65.6 %                >= 55  % LV Ejection Fraction MOD 4C       62.4 %                LV Ejection Fraction MOD 2C       71.1 %                IVC Diameter                      2.8 cm                DOPPLER AV Peak Velocity                  2 m/s                 AV Peak Gradient                  16.2 mmHg             AV Mean Gradient                  8.6 mmHg              LVOT Peak Velocity                 1.6 m/s               AV Area Cont Eq vti               2.5 cm???               MV Velocity Time Integral         32.5 cm               Mitral E Point Velocity           1.2 m/s               Mitral E to A Ratio               1.6                   MV Pressure Half Time             53 ms                 MV Area PHT                       4.2 cm???               MV Deceleration Time              183 ms                TR Peak Velocity                  333 cm/s              PV Peak Velocity                  1.5 m/s               PV Peak Gradient                  9.5 mmHg              RVOT Peak Velocity                1.2 m/s               * Indicates values subject to auto-interpretation LV EF:  60    % FINDINGS Left Ventricle Normal left ventricular chamber size. Mild concentric left ventricular hypertrophy. Normal left ventricular systolic function. Left ventricular ejection fraction is visually estimated to be 60%. Normal regional wall motion. Normal diastolic function. Right Ventricle Mildly dilated right ventricle. Normal right ventricular systolic function. Right Atrium Enlarged right atrium. Dilated inferior vena cava without inspiratory collapse. Left Atrium Normal left atrial size. Left atrial volume index is 33  mL/sq m. Mitral Valve Mitral annular calcification. No mitral stenosis. Mild mitral regurgitation. Aortic Valve The aortic valve is not well visualized. No aortic stenosis. Trace aortic insufficiency. Tricuspid Valve Structurally normal tricuspid valve. No tricuspid stenosis. Moderate tricuspid regurgitation. Right ventricular systolic pressure is estimated to be 65 mmHg. Pulmonic Valve The pulmonic valve is not well visualized. No pulmonic stenosis. No pulmonic insufficiency. Pericardium Normal pericardium without effusion. Aorta Ascending aorta diameter is 2.7  cm. Cirilo Morgan MD (Electronically Signed) Final Date:     18 November 2019                 17:30      LABORATORY  Lab  Results   Component Value Date    SODIUM 140 11/19/2019    POTASSIUM 4.3 11/19/2019    CHLORIDE 108 11/19/2019    CO2 26 11/19/2019    GLUCOSE 105 (H) 11/19/2019    BUN 19 11/19/2019    CREATININE 1.01 11/19/2019    CREATININE 0.9 09/11/2008        Lab Results   Component Value Date    WBC 9.8 11/19/2019    HEMOGLOBIN 10.0 (L) 11/19/2019    HEMATOCRIT 33.9 (L) 11/19/2019    PLATELETCT 247 11/19/2019        Total time of the discharge process exceeds 40 minutes.

## 2019-12-04 ENCOUNTER — PATIENT OUTREACH (OUTPATIENT)
Dept: HEALTH INFORMATION MANAGEMENT | Facility: OTHER | Age: 44
End: 2019-12-04

## 2019-12-05 ENCOUNTER — PATIENT OUTREACH (OUTPATIENT)
Dept: HEALTH INFORMATION MANAGEMENT | Facility: OTHER | Age: 44
End: 2019-12-05

## 2019-12-05 NOTE — PROGRESS NOTES
12/5. Spoke to Lorene via TC. Received referral from JC Wagoner. [facility] made a referral to [facility]. Informed Lorene that [facility] will be in contact with her.

## 2019-12-12 ENCOUNTER — PATIENT OUTREACH (OUTPATIENT)
Dept: HEALTH INFORMATION MANAGEMENT | Facility: OTHER | Age: 44
End: 2019-12-12

## 2019-12-23 NOTE — PROGRESS NOTES
SBAR Documentation: Situation, Background, Assessment, Recommendation     Situation    12/17/19 Outgoing call to follow up on patient status, patient answered and states she is doing better.    Background       PMH: KATHIA/COPD overlap syndrome, cardiomegaly, bipolar affective disorder, schizophrenia, obesity   Assessment    Patient has been to follow up appointments at [facility]. She reports her breathing has been a lot better the last few weeks but she still has not received any portable O2 from DME company. She thought the provider from her most recent appointment would be working on placing the order for O2.     Unfortunately, the pulmonary rehab program does not accept Medicaid FFS at this time. However, [facility] paramedicine program has initiated care for patient.    Recommendation [facility] RN to follow up with [facility] staff on portable O2.     [facility] RN to follow up with patient in one week, or sooner, if needed.    Escalation Protocol: No Escalation Needed

## 2020-01-06 ENCOUNTER — PATIENT OUTREACH (OUTPATIENT)
Dept: HEALTH INFORMATION MANAGEMENT | Facility: OTHER | Age: 45
End: 2020-01-06

## 2020-01-06 NOTE — PROGRESS NOTES
SBAR Documentation: Situation, Background, Assessment, Recommendation     Situation    Outgoing call to follow up on patient status.    Background       PMH: KATHIA/COPD overlap syndrome, cardiomegaly, bipolar affective disorder, schizophrenia, obesity   Assessment    Patient reports she has good days and other days where she needs to stop and rest more frequently. She is still being followed by [facility] paramedicine program. Patient has received her CPAP which was previously on backorder at the DME company. She has not yet received portable O2.    Recommendation Message left for [place]-care coordinator for PCP to look into portable O2 options and return call to patient and/or CCM RN.    Escalation Protocol: No Escalation Needed

## 2020-02-19 ENCOUNTER — SLEEP CENTER VISIT (OUTPATIENT)
Dept: SLEEP MEDICINE | Facility: MEDICAL CENTER | Age: 45
End: 2020-02-19
Payer: MEDICAID

## 2020-02-19 VITALS
WEIGHT: 285 LBS | RESPIRATION RATE: 18 BRPM | SYSTOLIC BLOOD PRESSURE: 118 MMHG | BODY MASS INDEX: 52.44 KG/M2 | DIASTOLIC BLOOD PRESSURE: 62 MMHG | HEIGHT: 62 IN | OXYGEN SATURATION: 94 % | HEART RATE: 85 BPM

## 2020-02-19 DIAGNOSIS — E66.01 CLASS 3 SEVERE OBESITY DUE TO EXCESS CALORIES WITH SERIOUS COMORBIDITY AND BODY MASS INDEX (BMI) OF 50.0 TO 59.9 IN ADULT (HCC): ICD-10-CM

## 2020-02-19 DIAGNOSIS — J44.9 CHRONIC OBSTRUCTIVE PULMONARY DISEASE, UNSPECIFIED COPD TYPE (HCC): ICD-10-CM

## 2020-02-19 DIAGNOSIS — G47.33 OSA (OBSTRUCTIVE SLEEP APNEA): ICD-10-CM

## 2020-02-19 PROCEDURE — 99214 OFFICE O/P EST MOD 30 MIN: CPT | Performed by: INTERNAL MEDICINE

## 2020-02-19 RX ORDER — TIOTROPIUM BROMIDE 18 UG/1
18 CAPSULE ORAL; RESPIRATORY (INHALATION) DAILY
Qty: 30 CAP | Refills: 6 | Status: ON HOLD | OUTPATIENT
Start: 2020-02-19 | End: 2020-11-10

## 2020-02-19 NOTE — PROGRESS NOTES
Chief Complaint   Patient presents with   • Follow-Up     KATHIA and COPD overlap syndrome, first compliance check     HPI: This patient is a 44 y.o. Female who returns for sleep apnea.  She moved from [place], [place] and was hospitalized for acute hypoxemic respiratory failure over the past year, requiring BiPAP therapy, attributed to obesity hypoventilation.  She had a sleep study in [place] however had never been set up on CPAP.   She had been a smoker carries a diagnosis of COPD.  She has abstained from further smoking.  Pulmonary function testing showed restrictive physiology (secondary to obesity ) with total lung capacity: 77%, FVC of 42% with bronchodilator response.  Her diffusion capacity was 87%.  Her BMI is >54.  She uses Spiriva daily.  Echocardiography showed normal left ventricular systolic function with RVSP estimated at 60 mmHg. In laboratory polysomnography showed very severe KATHIA with  events per hour associated with desaturations to a marina of 79%.  She failed CPAP and BiPAP titration. She underwent AVAPS titration with final pressure EPAP: 11 cm of water, IPAP: 24/15 cm of water, volume: 450 mL, rate of 14, and 3 L oxygen bleed in.  Initially she failed compliance and had her CPAP revoked.  She has since resumed treatment and presents today for first compliance check.  Compliance card shows 93% CPAP usage for 5.5 hours nightly.  Her average AHI  is 2.8.  She reports that she feels significantly better in terms of shortness of breath and fatigue.    Past Medical History:   Diagnosis Date   • Asthma    • Bipolar 2 disorder (HCC)    • Chickenpox    • Other psychological stress    • Schizophrenic disorder (HCC)        Social History     Socioeconomic History   • Marital status: Single     Spouse name: Not on file   • Number of children: Not on file   • Years of education: Not on file   • Highest education level: Not on file   Occupational History   • Not on file   Social Needs   • Financial resource  strain: Not hard at all   • Food insecurity     Worry: Never true     Inability: Never true   • Transportation needs     Medical: No     Non-medical: No   Tobacco Use   • Smoking status: Former Smoker     Packs/day: 1.00     Years: 20.00     Pack years: 20.00     Types: Cigarettes     Last attempt to quit: 2018     Years since quittin.7   • Smokeless tobacco: Never Used   • Tobacco comment: since , trying to quit   Substance and Sexual Activity   • Alcohol use: No   • Drug use: No   • Sexual activity: Not on file   Lifestyle   • Physical activity     Days per week: Not on file     Minutes per session: Not on file   • Stress: Not on file   Relationships   • Social connections     Talks on phone: Not on file     Gets together: Not on file     Attends Islam service: Not on file     Active member of club or organization: Not on file     Attends meetings of clubs or organizations: Not on file     Relationship status: Not on file   • Intimate partner violence     Fear of current or ex partner: Not on file     Emotionally abused: Not on file     Physically abused: Not on file     Forced sexual activity: Not on file   Other Topics Concern   • Not on file   Social History Narrative    From [place]       Family History   Problem Relation Age of Onset   • No Known Problems Mother    • Cancer Sister        Current Outpatient Medications on File Prior to Visit   Medication Sig Dispense Refill   • acetaminophen (TYLENOL) 325 MG Tab Take 2 Tabs by mouth every 6 hours as needed (Mild Pain; (Pain scale 1-3); Temp greater than 100.5 F). 30 Tab 0   • guaiFENesin ER 1200 MG TABLET SR 12 HR Take 1 Tab by mouth 2 Times a Day. 28 Tab 0   • tiotropium (SPIRIVA) 18 MCG Cap Inhale 1 Cap by mouth every day. 30 Cap 3   • furosemide (LASIX) 20 MG Tab Take 1 Tab by mouth every day. 30 Tab 0   • cyanocobalamin (VITAMIN B12) 1000 MCG Tab Take 1 Tab by mouth every day. 30 Tab    • atorvastatin (LIPITOR) 20 MG Tab Take 20 mg by mouth  "every bedtime.     • benztropine (COGENTIN) 1 MG Tab Take 1 mg by mouth every bedtime.     • fluticasone (FLONASE) 50 MCG/ACT nasal spray Spray 1 Spray in nose 2 Times a Day.     • haloperidol (HALDOL) 5 MG Tab Take 5 mg by mouth every bedtime.     • hydrOXYzine HCl (ATARAX) 25 MG Tab Take 25 mg by mouth 2 times a day as needed.     • montelukast (SINGULAIR) 10 MG Tab Take 10 mg by mouth every bedtime.     • raNITidine (ZANTAC) 150 MG Tab Take 150 mg by mouth every day.     • triamcinolone acetonide (KENALOG) 0.5 % Cream Apply 1 Application to affected area(s) 1 time daily as needed. For  itching  0   • lithium CR (LITHOBID) 300 MG Tab CR Take 300-600 mg by mouth 2 Times a Day. 300 mg in AM and 600 mg at bedtime     • albuterol 108 (90 Base) MCG/ACT Aero Soln inhalation aerosol Inhale 2 Puffs by mouth every 6 hours as needed for Shortness of Breath. 8.5 g 2   • aripiprazole (ABILIFY) 30 MG tablet Take 30 mg by mouth every morning.       No current facility-administered medications on file prior to visit.        Allergies: Amoxicillin    ROS:   Constitutional: Denies fevers, chills, night sweats, fatigue or weight loss  Eyes: Denies vision loss, pain, drainage, double vision  Ears, Nose, Throat: Denies earache, difficulty hearing, tinnitus, nasal congestion, hoarseness  Cardiovascular: Denies chest pain, tightness, palpitations, orthopnea or edema  Respiratory: Denies shortness of breath, cough, wheezing, hemoptysis  Sleep: Denies daytime sleepiness, snoring, apneas, insomnia, morning headaches  GI: Denies heartburn, dysphagia, nausea, abdominal pain, diarrhea or constipation  : Denies frequent urination, hematuria, discharge or painful urination  Musculoskeletal: Denies back pain, painful joints, sore muscles  Neurological: Denies weakness or headaches  Skin: No rashes    /62 (BP Location: Left arm, Patient Position: Sitting, BP Cuff Size: Large adult)   Pulse 85   Resp 18   Ht 1.575 m (5' 2\")   Wt (!) " 129.3 kg (285 lb)   SpO2 94%     Physical Exam:  Appearance: Well-nourished, well-developed, in no acute distress  HEENT: Normocephalic, atraumatic, white sclera, PERRLA, oropharynx clear  Neck: No adenopathy or masses  Respiratory: no intercostal retractions or accessory muscle use  Lungs auscultation: Clear to auscultation bilaterally  Cardiovascular: Regular rate rhythm. No murmurs, rubs or gallops.  No LE edema  Abdomen: soft, nondistended  Gait: Normal  Digits: No clubbing, cyanosis  Motor: No focal deficits  Orientation: Oriented to time, person and place    Diagnosis:  1. KATHIA (obstructive sleep apnea)     2. Class 3 severe obesity due to excess calories with serious comorbidity and body mass index (BMI) of 50.0 to 59.9 in adult ([facility])     3. Chronic obstructive pulmonary disease, unspecified COPD type ([facility])  PULMONARY FUNCTION TESTS -Test requested: Complete Pulmonary Function Test       Plan:  Lorene shows excellent compliance and response to AVAPS and is benefiting from treatment.  Continue treatment at above pressure settings with 3 L oxygen bleed in.  We will update pulmonary function testing to clarify her diagnosis of COPD.  Maintain smoking abstinence.  Continue Spiriva inhaler.  Patient's body mass index is 52.13 kg/m². Exercise and nutrition counseling were performed at this visit.  Return in about 6 months (around 8/19/2020).

## 2020-02-20 ENCOUNTER — PATIENT OUTREACH (OUTPATIENT)
Dept: HEALTH INFORMATION MANAGEMENT | Facility: OTHER | Age: 45
End: 2020-02-20

## 2020-03-17 ENCOUNTER — APPOINTMENT (OUTPATIENT)
Dept: RADIOLOGY | Facility: MEDICAL CENTER | Age: 45
End: 2020-03-17
Attending: EMERGENCY MEDICINE
Payer: MEDICAID

## 2020-03-17 ENCOUNTER — HOSPITAL ENCOUNTER (EMERGENCY)
Facility: MEDICAL CENTER | Age: 45
End: 2020-03-17
Attending: EMERGENCY MEDICINE
Payer: MEDICAID

## 2020-03-17 VITALS
BODY MASS INDEX: 53.55 KG/M2 | OXYGEN SATURATION: 99 % | TEMPERATURE: 98.6 F | RESPIRATION RATE: 20 BRPM | WEIGHT: 291.01 LBS | HEART RATE: 70 BPM | HEIGHT: 62 IN | DIASTOLIC BLOOD PRESSURE: 62 MMHG | SYSTOLIC BLOOD PRESSURE: 98 MMHG

## 2020-03-17 DIAGNOSIS — J22 LOWER RESPIRATORY INFECTION: ICD-10-CM

## 2020-03-17 LAB
ALBUMIN SERPL BCP-MCNC: 4.4 G/DL (ref 3.2–4.9)
ALBUMIN/GLOB SERPL: 1.4 G/DL
ALP SERPL-CCNC: 120 U/L (ref 30–99)
ALT SERPL-CCNC: 11 U/L (ref 2–50)
ANION GAP SERPL CALC-SCNC: 6 MMOL/L (ref 7–16)
ANISOCYTOSIS BLD QL SMEAR: ABNORMAL
AST SERPL-CCNC: 9 U/L (ref 12–45)
BASOPHILS # BLD AUTO: 0 % (ref 0–1.8)
BASOPHILS # BLD: 0 K/UL (ref 0–0.12)
BILIRUB SERPL-MCNC: 0.9 MG/DL (ref 0.1–1.5)
BLOOD CULTURE HOLD CXBCH: NORMAL
BUN SERPL-MCNC: 10 MG/DL (ref 8–22)
CALCIUM SERPL-MCNC: 9.8 MG/DL (ref 8.5–10.5)
CHLORIDE SERPL-SCNC: 105 MMOL/L (ref 96–112)
CO2 SERPL-SCNC: 27 MMOL/L (ref 20–33)
CREAT SERPL-MCNC: 0.95 MG/DL (ref 0.5–1.4)
EOSINOPHIL # BLD AUTO: 0.36 K/UL (ref 0–0.51)
EOSINOPHIL NFR BLD: 2.6 % (ref 0–6.9)
ERYTHROCYTE [DISTWIDTH] IN BLOOD BY AUTOMATED COUNT: 55.4 FL (ref 35.9–50)
FLUAV RNA SPEC QL NAA+PROBE: NEGATIVE
FLUBV RNA SPEC QL NAA+PROBE: NEGATIVE
GLOBULIN SER CALC-MCNC: 3.2 G/DL (ref 1.9–3.5)
GLUCOSE SERPL-MCNC: 107 MG/DL (ref 65–99)
HCT VFR BLD AUTO: 39.3 % (ref 37–47)
HGB BLD-MCNC: 11.7 G/DL (ref 12–16)
HYPOCHROMIA BLD QL SMEAR: ABNORMAL
LG PLATELETS BLD QL SMEAR: NORMAL
LYMPHOCYTES # BLD AUTO: 1.41 K/UL (ref 1–4.8)
LYMPHOCYTES NFR BLD: 10.2 % (ref 22–41)
MANUAL DIFF BLD: NORMAL
MCH RBC QN AUTO: 26.7 PG (ref 27–33)
MCHC RBC AUTO-ENTMCNC: 29.8 G/DL (ref 33.6–35)
MCV RBC AUTO: 89.5 FL (ref 81.4–97.8)
MONOCYTES # BLD AUTO: 0.36 K/UL (ref 0–0.85)
MONOCYTES NFR BLD AUTO: 2.6 % (ref 0–13.4)
MORPHOLOGY BLD-IMP: NORMAL
NEUTROPHILS # BLD AUTO: 11.67 K/UL (ref 2–7.15)
NEUTROPHILS NFR BLD: 84.6 % (ref 44–72)
NRBC # BLD AUTO: 0 K/UL
NRBC BLD-RTO: 0 /100 WBC
OVALOCYTES BLD QL SMEAR: NORMAL
PLATELET # BLD AUTO: 289 K/UL (ref 164–446)
PLATELET BLD QL SMEAR: NORMAL
PMV BLD AUTO: 11.2 FL (ref 9–12.9)
POIKILOCYTOSIS BLD QL SMEAR: NORMAL
POTASSIUM SERPL-SCNC: 4.3 MMOL/L (ref 3.6–5.5)
PROT SERPL-MCNC: 7.6 G/DL (ref 6–8.2)
RBC # BLD AUTO: 4.39 M/UL (ref 4.2–5.4)
RBC BLD AUTO: PRESENT
SODIUM SERPL-SCNC: 138 MMOL/L (ref 135–145)
WBC # BLD AUTO: 13.8 K/UL (ref 4.8–10.8)

## 2020-03-17 PROCEDURE — 96374 THER/PROPH/DIAG INJ IV PUSH: CPT

## 2020-03-17 PROCEDURE — 71045 X-RAY EXAM CHEST 1 VIEW: CPT

## 2020-03-17 PROCEDURE — 99284 EMERGENCY DEPT VISIT MOD MDM: CPT

## 2020-03-17 PROCEDURE — 700111 HCHG RX REV CODE 636 W/ 250 OVERRIDE (IP): Performed by: EMERGENCY MEDICINE

## 2020-03-17 PROCEDURE — 80053 COMPREHEN METABOLIC PANEL: CPT

## 2020-03-17 PROCEDURE — 85007 BL SMEAR W/DIFF WBC COUNT: CPT

## 2020-03-17 PROCEDURE — 85027 COMPLETE CBC AUTOMATED: CPT

## 2020-03-17 PROCEDURE — 87502 INFLUENZA DNA AMP PROBE: CPT

## 2020-03-17 RX ORDER — AZITHROMYCIN 250 MG/1
TABLET, FILM COATED ORAL
Qty: 6 TAB | Refills: 0 | Status: SHIPPED | OUTPATIENT
Start: 2020-03-17 | End: 2020-07-14

## 2020-03-17 RX ORDER — PREDNISONE 20 MG/1
60 TABLET ORAL DAILY
Qty: 30 TAB | Refills: 0 | Status: SHIPPED | OUTPATIENT
Start: 2020-03-17 | End: 2020-03-22

## 2020-03-17 RX ORDER — METHYLPREDNISOLONE SODIUM SUCCINATE 125 MG/2ML
125 INJECTION, POWDER, LYOPHILIZED, FOR SOLUTION INTRAMUSCULAR; INTRAVENOUS ONCE
Status: COMPLETED | OUTPATIENT
Start: 2020-03-17 | End: 2020-03-17

## 2020-03-17 RX ADMIN — METHYLPREDNISOLONE SODIUM SUCCINATE 125 MG: 125 INJECTION, POWDER, FOR SOLUTION INTRAMUSCULAR; INTRAVENOUS at 09:29

## 2020-03-17 ASSESSMENT — FIBROSIS 4 INDEX: FIB4 SCORE: 0.53

## 2020-03-17 NOTE — ED PROVIDER NOTES
"ED Provider Note    CHIEF COMPLAINT  Chief Complaint   Patient presents with   • Cough     Pt reports symptoms for the past 3 days. pt denies recent travel/fever. pt reports productive cough/yellow sputum.    • Sore Throat       HPI  Lorene Haro is a 44 y.o. female who presents with cough, congestion, runny nose.  Symptoms started 3 days ago.  Cough is productive of yellow sputum.  Has a mild associated sore throat.  She has a history of asthma.  Uses her inhalers and her symptoms seem improved.  She has not had a fever.  She denies difficulty breathing.  Denies leg swelling, orthopnea or PND.  Has not had any chest pain.  Continues to smoke cigarettes.    No travel out of [place].  No known contacts with COVID 19    Chart reviewed.  Patient with echo with normal EF.    REVIEW OF SYSTEMS  As per HPI, otherwise a 10 point review of systems is negative    PAST MEDICAL HISTORY  Past Medical History:   Diagnosis Date   • Asthma    • Bipolar 2 disorder (HCC)    • Chickenpox    • Other psychological stress    • Schizophrenic disorder (HCC)        SOCIAL HISTORY  Social History     Tobacco Use   • Smoking status: Former Smoker     Packs/day: 1.00     Years: 20.00     Pack years: 20.00     Types: Cigarettes     Last attempt to quit: 2018     Years since quittin.8   • Smokeless tobacco: Never Used   • Tobacco comment: since , trying to quit   Substance Use Topics   • Alcohol use: No   • Drug use: No       SURGICAL HISTORY  Past Surgical History:   Procedure Laterality Date   • CHOLECYSTECTOMY     • COLECTOMY     • HYSTERECTOMY LAPAROSCOPY     • KNEE ARTHROSCOPY         CURRENT MEDICATIONS  Home Medications    **Home medications have not yet been reviewed for this encounter**         ALLERGIES  Allergies   Allergen Reactions   • Amoxicillin Rash       PHYSICAL EXAM  VITAL SIGNS: BP (!) 98/62   Pulse 70   Temp 37 °C (98.6 °F) (Temporal)   Resp 20   Ht 1.575 m (5' 2\")   Wt (!) 132 kg (291 lb 0.1 oz)   LMP  " (LMP Unknown)   SpO2 99%   BMI 53.23 kg/m²    Constitutional: Awake and alert.  No distress.  Mask  HENT:  Atraumatic, Normocephalic.Oropharynx mildly dry dry mucus membranes, Nose normal inspection.   Eyes: Normal inspection  Neck: Supple, no JVD  Cardiovascular: Normal heart rate, Normal rhythm.  Symmetric peripheral pulses.   Thorax & Lungs: Very faint end expiratory wheeze.  Good air movement.  No crackles.  Abdomen: Bowel sounds normal, soft, non-distended, nontender, no mass  Skin: Warm, Dry, No rash.   Back: No tenderness, No CVA tenderness.   Extremities: Trace pedal edema.  Neurologic: Grossly normal   Psychiatric: Anxious appearing    RADIOLOGY/PROCEDURES  DX-CHEST-PORTABLE (1 VIEW)   Final Result         1.  Borderline cardiomegaly again noted.      2.  Perihilar congestive changes are decreased from the prior exam.      3.  No new infiltrates or consolidations are identified.           Imaging is interpreted by radiologist    Labs:  Results for orders placed or performed during the hospital encounter of 03/17/20   CBC WITH DIFFERENTIAL   Result Value Ref Range    WBC 13.8 (H) 4.8 - 10.8 K/uL    RBC 4.39 4.20 - 5.40 M/uL    Hemoglobin 11.7 (L) 12.0 - 16.0 g/dL    Hematocrit 39.3 37.0 - 47.0 %    MCV 89.5 81.4 - 97.8 fL    MCH 26.7 (L) 27.0 - 33.0 pg    MCHC 29.8 (L) 33.6 - 35.0 g/dL    RDW 55.4 (H) 35.9 - 50.0 fL    Platelet Count 289 164 - 446 K/uL    MPV 11.2 9.0 - 12.9 fL    Neutrophils-Polys 84.60 (H) 44.00 - 72.00 %    Lymphocytes 10.20 (L) 22.00 - 41.00 %    Monocytes 2.60 0.00 - 13.40 %    Eosinophils 2.60 0.00 - 6.90 %    Basophils 0.00 0.00 - 1.80 %    Nucleated RBC 0.00 /100 WBC    Neutrophils (Absolute) 11.67 (H) 2.00 - 7.15 K/uL    Lymphs (Absolute) 1.41 1.00 - 4.80 K/uL    Monos (Absolute) 0.36 0.00 - 0.85 K/uL    Eos (Absolute) 0.36 0.00 - 0.51 K/uL    Baso (Absolute) 0.00 0.00 - 0.12 K/uL    NRBC (Absolute) 0.00 K/uL    Hypochromia 1+     Anisocytosis 1+    COMP METABOLIC PANEL   Result  Value Ref Range    Sodium 138 135 - 145 mmol/L    Potassium 4.3 3.6 - 5.5 mmol/L    Chloride 105 96 - 112 mmol/L    Co2 27 20 - 33 mmol/L    Anion Gap 6.0 (L) 7.0 - 16.0    Glucose 107 (H) 65 - 99 mg/dL    Bun 10 8 - 22 mg/dL    Creatinine 0.95 0.50 - 1.40 mg/dL    Calcium 9.8 8.5 - 10.5 mg/dL    AST(SGOT) 9 (L) 12 - 45 U/L    ALT(SGPT) 11 2 - 50 U/L    Alkaline Phosphatase 120 (H) 30 - 99 U/L    Total Bilirubin 0.9 0.1 - 1.5 mg/dL    Albumin 4.4 3.2 - 4.9 g/dL    Total Protein 7.6 6.0 - 8.2 g/dL    Globulin 3.2 1.9 - 3.5 g/dL    A-G Ratio 1.4 g/dL   Influenza A/B By PCR (Adult - Flu Only)   Result Value Ref Range    Influenza virus A RNA Negative Negative    Influenza virus B, PCR Negative Negative   Blood Culture,Hold   Result Value Ref Range    Blood Culture Hold Collected    ESTIMATED GFR   Result Value Ref Range    GFR If African American >60 >60 mL/min/1.73 m 2    GFR If Non African American >60 >60 mL/min/1.73 m 2   DIFFERENTIAL MANUAL   Result Value Ref Range    Manual Diff Status PERFORMED    PERIPHERAL SMEAR REVIEW   Result Value Ref Range    Peripheral Smear Review see below    PLATELET ESTIMATE   Result Value Ref Range    Plt Estimation Normal    MORPHOLOGY   Result Value Ref Range    RBC Morphology Present     Large Platelets 1+     Poikilocytosis 1+     Ovalocytes 1+        Medications   methylPREDNISolone sod succ (SOLU-MEDROL) 125 MG injection 125 mg (125 mg Intravenous Given 3/17/20 0929)     [facility] COVID-19 algorithm, current as or today, was followed.  N95 mask, eye protection, gloves and gown was worn.        COURSE & MEDICAL DECISION MAKING  Patient presents with URI symptoms.  History of asthma.  Very minimal bronchospasm.  Not hypoxic or tachypneic.  She was given Solu-Medrol.  X-ray was obtained without evidence of infiltrate.  Obtained laboratory data.  She has mild leukocytosis likely related to this illness.  Perhaps there is very mild lower respiratory tract infection.  At this point the  patient appears well and appropriate for outpatient management.  She will be started on a 5-day pulse of prednisone.  Advised albuterol every 4-6 hours as needed.  Given a prescription for a azithromycin for possible lower respiratory infection.  She does not meet criteria for COVID testing although this is a possibility.  She was given precautions to return to the ER for difficulty breathing, worsening symptoms, not improving or concern.    FINAL IMPRESSION  1.  Lower respiratory tract infection with associated asthma exacerbation      This dictation was created using voice recognition software. The accuracy of the dictation is limited to the abilities of the software.  The nursing notes were reviewed and certain aspects of this information were incorporated into this note.      Electronically signed by: David Alejandra M.D., 3/17/2020 12:14 PM

## 2020-03-17 NOTE — ED NOTES
Discharge instructions reviewed with patient and signed. IV removed from her arm. She was provided with her prescriptions, has all her belongings and ambulates with a steady gait

## 2020-03-17 NOTE — ED TRIAGE NOTES
Chief Complaint   Patient presents with   • Cough     Pt reports symptoms for the past 3 days. pt denies recent travel/fever. pt reports productive cough/yellow sputum.    • Sore Throat     Explained to pt triage process, made pt aware to tell this RN/staff of any changes/concerns, pt verbalized understanding of process and instructions given. Pt to ER omega.

## 2020-03-20 ENCOUNTER — APPOINTMENT (OUTPATIENT)
Dept: PULMONOLOGY | Facility: HOSPICE | Age: 45
End: 2020-03-20
Payer: MEDICAID

## 2020-05-21 ENCOUNTER — NON-PROVIDER VISIT (OUTPATIENT)
Dept: PULMONOLOGY | Facility: HOSPICE | Age: 45
End: 2020-05-21
Attending: INTERNAL MEDICINE
Payer: MEDICAID

## 2020-05-21 VITALS — BODY MASS INDEX: 49.93 KG/M2 | WEIGHT: 273 LBS

## 2020-05-21 DIAGNOSIS — J44.9 CHRONIC OBSTRUCTIVE PULMONARY DISEASE, UNSPECIFIED COPD TYPE (HCC): ICD-10-CM

## 2020-05-21 PROCEDURE — 94726 PLETHYSMOGRAPHY LUNG VOLUMES: CPT | Performed by: INTERNAL MEDICINE

## 2020-05-21 PROCEDURE — 94060 EVALUATION OF WHEEZING: CPT | Performed by: INTERNAL MEDICINE

## 2020-05-21 PROCEDURE — 94729 DIFFUSING CAPACITY: CPT | Performed by: INTERNAL MEDICINE

## 2020-05-21 ASSESSMENT — PULMONARY FUNCTION TESTS
FEV1: 1.78
FEV1/FVC: 90
FVC_PERCENT_PREDICTED: 60
FEV1/FVC_PERCENT_PREDICTED: 110
FEV1_PERCENT_CHANGE: 3
FEV1/FVC: 88.89
FEV1/FVC: 89
FEV1/FVC_PERCENT_CHANGE: 0
FEV1_PERCENT_CHANGE: 4
FEV1/FVC_PREDICTED: 81
FEV1_PREDICTED: 2.62
FVC: 1.98
FEV1_LLN: 2.18
FVC_LLN: 2.72
FEV1: 1.84
FEV1/FVC_PERCENT_PREDICTED: 111
FEV1/FVC_PERCENT_PREDICTED: 111
FEV1/FVC_PERCENT_PREDICTED: 113
FEV1/FVC_PERCENT_CHANGE: 75
FEV1_PERCENT_PREDICTED: 68
FEV1/FVC_PERCENT_PREDICTED: 81
FEV1_PERCENT_PREDICTED: 70
FVC_PREDICTED: 3.25
FVC_PERCENT_PREDICTED: 63
FVC: 2.07
FEV1/FVC: 90
FEV1/FVC_PERCENT_LLN: 68

## 2020-05-21 ASSESSMENT — FIBROSIS 4 INDEX: FIB4 SCORE: 0.43

## 2020-05-21 NOTE — PROCEDURES
Technician: BRETT Johnson    Technician Comment:  Good patient effort & cooperation.  The results of this test meet the ATS/ERS standards for acceptability & reproducibility.  Test was performed on the Go[employer] Body Plethysmograph-Elite DX system.  Predicted values were GLI-2012 for spirometry, GLI-2017 for DLCO, ITS for Lung Volumes.  The DLCO was uncorrected for Hgb.  A bronchodilator of Ventolin HFA -2puffs via spacer administered.  DLCO performed during dilation period.    The FVC is 2.07 L or 63%, FEV1 is 1.84 L or 70%, FEV1/FVC: 89%.  Total lung capacity: 80%.  DLCO: 113%.  No significant bronchodilator response.    Interpretation:  Pseudo-restriction secondary to BMI: 51.  Normal gas transfer.

## 2020-06-11 ENCOUNTER — HOSPITAL ENCOUNTER (EMERGENCY)
Facility: MEDICAL CENTER | Age: 45
End: 2020-06-11
Attending: EMERGENCY MEDICINE
Payer: MEDICAID

## 2020-06-11 ENCOUNTER — APPOINTMENT (OUTPATIENT)
Dept: RADIOLOGY | Facility: MEDICAL CENTER | Age: 45
End: 2020-06-11
Attending: EMERGENCY MEDICINE
Payer: MEDICAID

## 2020-06-11 VITALS
OXYGEN SATURATION: 97 % | WEIGHT: 273 LBS | HEART RATE: 68 BPM | SYSTOLIC BLOOD PRESSURE: 112 MMHG | TEMPERATURE: 97.9 F | DIASTOLIC BLOOD PRESSURE: 65 MMHG | BODY MASS INDEX: 50.24 KG/M2 | RESPIRATION RATE: 16 BRPM | HEIGHT: 62 IN

## 2020-06-11 DIAGNOSIS — S80.01XA CONTUSION OF RIGHT KNEE, INITIAL ENCOUNTER: ICD-10-CM

## 2020-06-11 PROCEDURE — 99284 EMERGENCY DEPT VISIT MOD MDM: CPT

## 2020-06-11 PROCEDURE — 73562 X-RAY EXAM OF KNEE 3: CPT | Mod: RT

## 2020-06-11 PROCEDURE — 700102 HCHG RX REV CODE 250 W/ 637 OVERRIDE(OP): Performed by: EMERGENCY MEDICINE

## 2020-06-11 PROCEDURE — A9270 NON-COVERED ITEM OR SERVICE: HCPCS | Performed by: EMERGENCY MEDICINE

## 2020-06-11 RX ORDER — ACETAMINOPHEN 325 MG/1
650 TABLET ORAL ONCE
Status: COMPLETED | OUTPATIENT
Start: 2020-06-11 | End: 2020-06-11

## 2020-06-11 RX ORDER — IBUPROFEN 600 MG/1
600 TABLET ORAL ONCE
Status: COMPLETED | OUTPATIENT
Start: 2020-06-11 | End: 2020-06-11

## 2020-06-11 RX ADMIN — IBUPROFEN 600 MG: 600 TABLET ORAL at 15:47

## 2020-06-11 RX ADMIN — ACETAMINOPHEN 650 MG: 325 TABLET, FILM COATED ORAL at 15:47

## 2020-06-11 ASSESSMENT — LIFESTYLE VARIABLES: DO YOU DRINK ALCOHOL: NO

## 2020-06-11 NOTE — ED TRIAGE NOTES
.  Chief Complaint   Patient presents with   • Knee Pain   • Fall     Mechanical trip and fall   Pt BIB EMS from home.  Pt was walking outside her home, tripped and fell after striking some raised concrete.  Pt reports increased pain weight bearing.  RLE distal CMS intact.  Pt able to walk to EMS [facility].      CC:  Ashley Potts is here today for a follow up on hypertension and diabetes.    Medications: medications verified and updated  Refills needed today?  NO  Denies known Latex allergy or symptoms of Latex sensitivity.  Patient would like communication of their results via:    Work Phone: [phone] (work)  Okay to leave a message containing results? Yes  Tobacco history: verified    Health Maintenance Due   Topic Date Due   • Colorectal Cancer Screening-Colonoscopy  10/13/2007   • Diabetes Foot Exam  02/22/2018     Patient is due for topics as listed above, she wishes to decline at this time.    [facility] status addressed. Patient Active.

## 2020-06-11 NOTE — ED PROVIDER NOTES
"ED Provider Note    CHIEF COMPLAINT  Chief Complaint   Patient presents with   • Knee Pain   • Fall     Mechanical trip and fall       HPI  Lorene Martines is a 44 y.o. female who presents Stating that she had a mechanical trip and fall accident today just prior to arrival while navigating stairs at her apartment.  She states that she fell directly onto the medial aspect of her right knee and has had some trouble walking since.Is concerned that she might have a fracture    ROS  Pertinent negative For numbness or paresthesias.    PAST MEDICAL HISTORY  No past medical history on file.    FAMILY HISTORY  No family history on file.    SOCIAL HISTORY       SURGICAL HISTORY  No past surgical history on file.    CURRENT MEDICATIONS  Home Medications    **Home medications have not yet been reviewed for this encounter**         ALLERGIES  Not on File    PHYSICAL EXAM  VITAL SIGNS: /74   Pulse 84   Temp 37.1 °C (98.7 °F) (Temporal)   Resp 20   Ht 1.575 m (5' 2\")   Wt 123.8 kg (273 lb)   SpO2 94%   BMI 49.93 kg/m²    Constitutional: Well developed, Well nourished, No acute distress, Non-toxic appearance.   Skin: No evidence of trauma  Back: Nontender  Extremities: Minimal tenderness about medial aspect of right knee with full range of motion  Musculoskeletal:   Neurologic: Antalgic gait noted but neurologically intact  Psychiatric:     RADIOLOGY/PROCEDURES  DX-KNEE 3 VIEWS RIGHT   Final Result      Moderate patellofemoral greater than lateral femorotibial spurring with no evidence of acute traumatic injury            COURSE & MEDICAL DECISION MAKING  Pertinent Labs & Imaging studies reviewed. (See chart for details)  Patient presents with right knee tenderness after mechanical fall onto her right knee and I x-rayed which shows no evidence of acute fracture.  There appears to be small spurring.  Patient is informed of these findings and discharged stable condition after receiving Tylenol and ibuprofen in the " emergency department  Think she suffered a mild contusion to the right knee and will do well with over-the-counter medications for discomfort    FINAL IMPRESSION  1. Contusion of right knee, initial encounter            Electronically signed by: Shay Nicole M.D., 6/11/2020 4:26 PM    The note accurately reflects work and decisions made by me.  Shay Nicole M.D.  6/11/2020  4:29 PM

## 2020-06-23 ENCOUNTER — APPOINTMENT (OUTPATIENT)
Dept: RADIOLOGY | Facility: MEDICAL CENTER | Age: 45
End: 2020-06-23
Attending: EMERGENCY MEDICINE
Payer: MEDICAID

## 2020-06-23 ENCOUNTER — HOSPITAL ENCOUNTER (EMERGENCY)
Facility: MEDICAL CENTER | Age: 45
End: 2020-06-23
Attending: EMERGENCY MEDICINE
Payer: MEDICAID

## 2020-06-23 VITALS
RESPIRATION RATE: 14 BRPM | BODY MASS INDEX: 51.03 KG/M2 | OXYGEN SATURATION: 97 % | WEIGHT: 270.28 LBS | DIASTOLIC BLOOD PRESSURE: 69 MMHG | SYSTOLIC BLOOD PRESSURE: 117 MMHG | HEART RATE: 57 BPM | TEMPERATURE: 97.1 F | HEIGHT: 61 IN

## 2020-06-23 DIAGNOSIS — M79.641 PAIN IN BOTH HANDS: ICD-10-CM

## 2020-06-23 DIAGNOSIS — M79.642 PAIN IN BOTH HANDS: ICD-10-CM

## 2020-06-23 LAB
ANION GAP SERPL CALC-SCNC: 10 MMOL/L (ref 7–16)
BASOPHILS # BLD AUTO: 0.3 % (ref 0–1.8)
BASOPHILS # BLD: 0.04 K/UL (ref 0–0.12)
BUN SERPL-MCNC: 15 MG/DL (ref 8–22)
CALCIUM SERPL-MCNC: 9.5 MG/DL (ref 8.5–10.5)
CHLORIDE SERPL-SCNC: 103 MMOL/L (ref 96–112)
CO2 SERPL-SCNC: 23 MMOL/L (ref 20–33)
CREAT SERPL-MCNC: 0.96 MG/DL (ref 0.5–1.4)
EOSINOPHIL # BLD AUTO: 0.18 K/UL (ref 0–0.51)
EOSINOPHIL NFR BLD: 1.3 % (ref 0–6.9)
ERYTHROCYTE [DISTWIDTH] IN BLOOD BY AUTOMATED COUNT: 54 FL (ref 35.9–50)
ERYTHROCYTE [SEDIMENTATION RATE] IN BLOOD BY WESTERGREN METHOD: 20 MM/HOUR (ref 0–20)
GLUCOSE SERPL-MCNC: 98 MG/DL (ref 65–99)
HCT VFR BLD AUTO: 42.7 % (ref 37–47)
HGB BLD-MCNC: 12.9 G/DL (ref 12–16)
IMM GRANULOCYTES # BLD AUTO: 0.07 K/UL (ref 0–0.11)
IMM GRANULOCYTES NFR BLD AUTO: 0.5 % (ref 0–0.9)
LYMPHOCYTES # BLD AUTO: 1.4 K/UL (ref 1–4.8)
LYMPHOCYTES NFR BLD: 10 % (ref 22–41)
MCH RBC QN AUTO: 27.5 PG (ref 27–33)
MCHC RBC AUTO-ENTMCNC: 30.2 G/DL (ref 33.6–35)
MCV RBC AUTO: 91 FL (ref 81.4–97.8)
MONOCYTES # BLD AUTO: 0.74 K/UL (ref 0–0.85)
MONOCYTES NFR BLD AUTO: 5.3 % (ref 0–13.4)
NEUTROPHILS # BLD AUTO: 11.54 K/UL (ref 2–7.15)
NEUTROPHILS NFR BLD: 82.6 % (ref 44–72)
NRBC # BLD AUTO: 0 K/UL
NRBC BLD-RTO: 0 /100 WBC
PLATELET # BLD AUTO: 320 K/UL (ref 164–446)
PMV BLD AUTO: 10.8 FL (ref 9–12.9)
POTASSIUM SERPL-SCNC: 4.2 MMOL/L (ref 3.6–5.5)
RBC # BLD AUTO: 4.69 M/UL (ref 4.2–5.4)
SODIUM SERPL-SCNC: 136 MMOL/L (ref 135–145)
WBC # BLD AUTO: 14 K/UL (ref 4.8–10.8)

## 2020-06-23 PROCEDURE — 96372 THER/PROPH/DIAG INJ SC/IM: CPT

## 2020-06-23 PROCEDURE — 700111 HCHG RX REV CODE 636 W/ 250 OVERRIDE (IP): Performed by: EMERGENCY MEDICINE

## 2020-06-23 PROCEDURE — 85025 COMPLETE CBC W/AUTO DIFF WBC: CPT

## 2020-06-23 PROCEDURE — 73110 X-RAY EXAM OF WRIST: CPT | Mod: LT

## 2020-06-23 PROCEDURE — 80048 BASIC METABOLIC PNL TOTAL CA: CPT

## 2020-06-23 PROCEDURE — 73130 X-RAY EXAM OF HAND: CPT | Mod: RT

## 2020-06-23 PROCEDURE — 73110 X-RAY EXAM OF WRIST: CPT | Mod: RT

## 2020-06-23 PROCEDURE — 85652 RBC SED RATE AUTOMATED: CPT

## 2020-06-23 PROCEDURE — 99283 EMERGENCY DEPT VISIT LOW MDM: CPT

## 2020-06-23 RX ORDER — METHYLPREDNISOLONE 4 MG/1
TABLET ORAL
Qty: 1 PACKAGE | Refills: 0 | Status: SHIPPED | OUTPATIENT
Start: 2020-06-23 | End: 2020-07-14

## 2020-06-23 RX ORDER — KETOROLAC TROMETHAMINE 30 MG/ML
60 INJECTION, SOLUTION INTRAMUSCULAR; INTRAVENOUS ONCE
Status: COMPLETED | OUTPATIENT
Start: 2020-06-23 | End: 2020-06-23

## 2020-06-23 RX ORDER — NAPROXEN 500 MG/1
500 TABLET ORAL 2 TIMES DAILY WITH MEALS
Qty: 20 TAB | Refills: 0 | Status: SHIPPED | OUTPATIENT
Start: 2020-06-23 | End: 2020-07-14

## 2020-06-23 RX ADMIN — KETOROLAC TROMETHAMINE 60 MG: 30 INJECTION, SOLUTION INTRAMUSCULAR at 11:27

## 2020-06-23 ASSESSMENT — FIBROSIS 4 INDEX: FIB4 SCORE: 0.43

## 2020-06-23 NOTE — DISCHARGE INSTRUCTIONS
Your x-ray shows no fracture, your lab test shows no sign of infection.  You have some abrasions, his pains may still be from the fall.  He will be treated with pain medication and steroid medication.  Please call your primary doctor for follow-up in the next few days.

## 2020-06-23 NOTE — ED NOTES
Patient given discharge instructions, including rx information. Pt educated to  rx at [facility]. Pt verbalized understanding. Pt denies questions/concerns about d/c. Pt ambulated to ED exit without difficulty. Mother will pick pt up from ED.

## 2020-06-23 NOTE — ED TRIAGE NOTES
"..  Chief Complaint   Patient presents with   • Arm Pain     reports pain, and swelling of right arm radiating to shoulder. Pt had a fall and was seen on the 11th.    ../70   Pulse 65   Temp 36.9 °C (98.5 °F) (Temporal)   Resp 18   Ht 1.549 m (5' 1\")   Wt 122.6 kg (270 lb 4.5 oz)   SpO2 96%      ..Explained triage process, to waiting room. Asked to inform RN if questions or concerns arise.   "

## 2020-06-23 NOTE — ED NOTES
Patient ambulated from triage to PUR 78. Patient c/o R arm pain radiating to R shoulder x1 week. Patient denies injury/fall causing arm pain. Pt denies CP.

## 2020-06-23 NOTE — ED PROVIDER NOTES
ED Provider  Scribed for Troy Randolph D.O. by Mel Mcdaniel. 2020  10:43 AM    Means of arrival:Walk in  History obtained from: patient  History limited by: none    CHIEF COMPLAINT  Chief Complaint   Patient presents with   • Arm Pain     reports pain, and swelling of right arm radiating to shoulder. Pt had a fall and was seen on the .        HPI  Lorene Haro is a 44 y.o. female who presents right arm pain onset 1 weeks ago. Patient describes that on  she was walking and texting when she tripped on a curb causing her to fall. She was seen in the ED after her fall and had a negative x-ray of her right knee. Two days after her fall she developed the arm pain and swelling that brought her to the ED today. She endorses pain and swelling in her right arm, worst in her fingers however it radiated up her arm into her right shoulder. She ha associated scrapes from fall on bilateral hands.Denies loss of sensation, fever, or cough.    REVIEW OF SYSTEMS  See HPI for further details. All other systems are negative.     PAST MEDICAL HISTORY   has a past medical history of Asthma, Bipolar 2 disorder (HCC), Chickenpox, Other psychological stress, and Schizophrenic disorder ([facility]).    SOCIAL HISTORY  Social History     Tobacco Use   • Smoking status: Former Smoker     Packs/day: 1.00     Years: 20.00     Pack years: 20.00     Types: Cigarettes     Last attempt to quit: 2018     Years since quittin.1   • Smokeless tobacco: Never Used   • Tobacco comment: since , trying to quit   Substance and Sexual Activity   • Alcohol use: No   • Drug use: No   • Sexual activity: Not on file       SURGICAL HISTORY   has a past surgical history that includes hysterectomy laparoscopy; colectomy; knee arthroscopy; and cholecystectomy.    CURRENT MEDICATIONS  Home Medications     Reviewed by Lauryn Briseno R.N. (Registered Nurse) on 20 at 1022  Med List Status: Not Addressed   Medication Last Dose Status  "  acetaminophen (TYLENOL) 325 MG Tab  Active   albuterol 108 (90 Base) MCG/ACT Aero Soln inhalation aerosol  Active   aripiprazole (ABILIFY) 30 MG tablet  Active   atorvastatin (LIPITOR) 20 MG Tab  Active   azithromycin (ZITHROMAX) 250 MG Tab  Active   benztropine (COGENTIN) 1 MG Tab  Active   cyanocobalamin (VITAMIN B12) 1000 MCG Tab  Active   fluticasone (FLONASE) 50 MCG/ACT nasal spray  Active   furosemide (LASIX) 20 MG Tab  Active   guaiFENesin ER 1200 MG TABLET SR 12 HR  Active   haloperidol (HALDOL) 5 MG Tab  Active   hydrOXYzine HCl (ATARAX) 25 MG Tab  Active   lithium CR (LITHOBID) 300 MG Tab CR  Active   montelukast (SINGULAIR) 10 MG Tab  Active   raNITidine (ZANTAC) 150 MG Tab  Active   tiotropium (SPIRIVA) 18 MCG Cap  Active   triamcinolone acetonide (KENALOG) 0.5 % Cream  Active                ALLERGIES  Allergies   Allergen Reactions   • Amoxicillin Rash       PHYSICAL EXAM  VITAL SIGNS: /70   Pulse 65   Temp 36.9 °C (98.5 °F) (Temporal)   Resp 18   Ht 1.549 m (5' 1\")   Wt 122.6 kg (270 lb 4.5 oz)   LMP  (LMP Unknown)   SpO2 96%   BMI 51.07 kg/m²   Constitutional: Alert in no apparent distress.  HENT: No signs of trauma, mucous membranes are moist  Eyes: Conjunctiva normal, Non-icteric.   Neck: Normal range of motion, No tenderness, Supple.  Lymphatic: No lymphadenopathy noted.   Cardiovascular: Regular rate and rhythm, no murmurs.   Thorax & Lungs: Normal breath sounds, No respiratory distress, No wheezing, No chest tenderness.   Abdomen: Bowel sounds normal, Soft, No tenderness, No masses, No pulsatile masses. No peritoneal signs.  Skin: Warm, Dry, normal color.   Back: No bony tenderness, No CVA tenderness.   Extremities: No edema, No tenderness, No cyanosis, healing abrasions to bilateral dorsum of hands, right hand with mild swelling and limited range of motion secondary to pain and swelling. range of motion in elbow and shoulder intact, distal neurovascular intact.  Musculoskeletal: " Good range of motion in all major joints. No tenderness to palpation or major deformities noted.   Neurologic: Alert and oriented x4, Normal motor function, Normal sensory function, No focal deficits noted.   Psychiatric: Affect normal, Judgment normal, Mood normal.     DIAGNOSTIC STUDIES / PROCEDURES    LABS  Results for orders placed or performed during the hospital encounter of 06/23/20   CBC WITH DIFFERENTIAL   Result Value Ref Range    WBC 14.0 (H) 4.8 - 10.8 K/uL    RBC 4.69 4.20 - 5.40 M/uL    Hemoglobin 12.9 12.0 - 16.0 g/dL    Hematocrit 42.7 37.0 - 47.0 %    MCV 91.0 81.4 - 97.8 fL    MCH 27.5 27.0 - 33.0 pg    MCHC 30.2 (L) 33.6 - 35.0 g/dL    RDW 54.0 (H) 35.9 - 50.0 fL    Platelet Count 320 164 - 446 K/uL    MPV 10.8 9.0 - 12.9 fL    Neutrophils-Polys 82.60 (H) 44.00 - 72.00 %    Lymphocytes 10.00 (L) 22.00 - 41.00 %    Monocytes 5.30 0.00 - 13.40 %    Eosinophils 1.30 0.00 - 6.90 %    Basophils 0.30 0.00 - 1.80 %    Immature Granulocytes 0.50 0.00 - 0.90 %    Nucleated RBC 0.00 /100 WBC    Neutrophils (Absolute) 11.54 (H) 2.00 - 7.15 K/uL    Lymphs (Absolute) 1.40 1.00 - 4.80 K/uL    Monos (Absolute) 0.74 0.00 - 0.85 K/uL    Eos (Absolute) 0.18 0.00 - 0.51 K/uL    Baso (Absolute) 0.04 0.00 - 0.12 K/uL    Immature Granulocytes (abs) 0.07 0.00 - 0.11 K/uL    NRBC (Absolute) 0.00 K/uL   BASIC METABOLIC PANEL   Result Value Ref Range    Sodium 136 135 - 145 mmol/L    Potassium 4.2 3.6 - 5.5 mmol/L    Chloride 103 96 - 112 mmol/L    Co2 23 20 - 33 mmol/L    Glucose 98 65 - 99 mg/dL    Bun 15 8 - 22 mg/dL    Creatinine 0.96 0.50 - 1.40 mg/dL    Calcium 9.5 8.5 - 10.5 mg/dL    Anion Gap 10.0 7.0 - 16.0   ESTIMATED GFR   Result Value Ref Range    GFR If African American >60 >60 mL/min/1.73 m 2    GFR If Non African American >60 >60 mL/min/1.73 m 2     All labs reviewed by me.    RADIOLOGY  DX-WRIST-COMPLETE 3+ LEFT   Final Result      Unremarkable wrist/hand series.      DX-WRIST-COMPLETE 3+ RIGHT   Final  Result      Unremarkable wrist series.      DX-HAND 3+ RIGHT   Final Result      Unremarkable hand series.        The radiologist's interpretations of all radiological studies have been reviewed by me.    Films have been independently by me      COURSE  Pertinent Labs & Imaging studies reviewed. (See chart for details)    10:43 AM - Patient seen and examined at bedside. Discussed plan of care, including x-rays and labs to evaluate her symptoms. The patient will be medicated with Toradol 60 mg. Ordered for left wrist x-ray, right wrist x-ray, right hand x-ray, CBC w/ diff, BMP, and sed rate to evaluate her symptoms.     12:37 PM - Patient was reevaluated. Her pain is improved after medication. Updated patient on findings and discussed discharge. Patient will be discharged at this time. She verbalizes agreement with discharge and plan of care.    MEDICAL DECISION MAKING  This is a 44 y.o. female who presents with bilateral hand and arm pain.  She had a ground-level fall she was seen here for knee pain at which time that was evaluated and was negative.  She has since developed bilateral hand pain, right hand is worse than the left.  She has abrasions on both hands and the dorsum, there is mild swelling no erythema I do not consider it to appear cellulitis in nature.  There is no deformity.  X-rays of the hands and wrist were done and shows no abnormality.  She was medicated for pain with good relief.    She appears to have either contusion, or arthralgia and she is discharged home with symptomatic care.      The patient will return for new or worsening symptoms and is stable at the time of discharge.    DISPOSITION:  Patient will be discharged home in stable condition.    FOLLOW UP:  SHANKAR Gleason  [address] [place] [id number] [phone]    In 3 days        OUTPATIENT MEDICATIONS:  New Prescriptions    METHYLPREDNISOLONE (MEDROL DOSEPAK) 4 MG TABLET THERAPY PACK    Use as directed    NAPROXEN  (NAPROSYN) 500 MG TAB    Take 1 Tab by mouth 2 times a day, with meals.       FINAL IMPRESSION  1. Pain in both hands         I, Mel Mcdaniel (Scribe), am scribing for, and in the presence of, Troy Randolph D.O..    Electronically signed by: Mel Mcdaniel (Scribe), 6/23/2020    I, Troy Randolph D.O. personally performed the services described in this documentation, as scribed by Mel Mcdaniel in my presence, and it is both accurate and complete. C    The note accurately reflects work and decisions made by me.  Troy Randolph D.O.  6/23/2020  3:25 PM

## 2020-07-14 ENCOUNTER — APPOINTMENT (OUTPATIENT)
Dept: RADIOLOGY | Facility: MEDICAL CENTER | Age: 45
End: 2020-07-14
Attending: STUDENT IN AN ORGANIZED HEALTH CARE EDUCATION/TRAINING PROGRAM
Payer: MEDICAID

## 2020-07-14 ENCOUNTER — HOSPITAL ENCOUNTER (EMERGENCY)
Facility: MEDICAL CENTER | Age: 45
End: 2020-07-14
Attending: EMERGENCY MEDICINE
Payer: MEDICAID

## 2020-07-14 VITALS
RESPIRATION RATE: 19 BRPM | OXYGEN SATURATION: 96 % | BODY MASS INDEX: 52.15 KG/M2 | SYSTOLIC BLOOD PRESSURE: 132 MMHG | HEART RATE: 62 BPM | WEIGHT: 276.02 LBS | TEMPERATURE: 98.6 F | DIASTOLIC BLOOD PRESSURE: 64 MMHG

## 2020-07-14 DIAGNOSIS — L03.116 CELLULITIS OF LEFT LOWER EXTREMITY: ICD-10-CM

## 2020-07-14 LAB
ALBUMIN SERPL BCP-MCNC: 4.4 G/DL (ref 3.2–4.9)
ALBUMIN/GLOB SERPL: 1.4 G/DL
ALP SERPL-CCNC: 119 U/L (ref 30–99)
ALT SERPL-CCNC: 39 U/L (ref 2–50)
ANION GAP SERPL CALC-SCNC: 14 MMOL/L (ref 7–16)
AST SERPL-CCNC: 19 U/L (ref 12–45)
BASOPHILS # BLD AUTO: 0.2 % (ref 0–1.8)
BASOPHILS # BLD: 0.03 K/UL (ref 0–0.12)
BILIRUB SERPL-MCNC: 0.8 MG/DL (ref 0.1–1.5)
BLOOD CULTURE HOLD CXBCH: NORMAL
BUN SERPL-MCNC: 12 MG/DL (ref 8–22)
CALCIUM SERPL-MCNC: 9.8 MG/DL (ref 8.5–10.5)
CHLORIDE SERPL-SCNC: 101 MMOL/L (ref 96–112)
CO2 SERPL-SCNC: 23 MMOL/L (ref 20–33)
CREAT SERPL-MCNC: 0.99 MG/DL (ref 0.5–1.4)
EOSINOPHIL # BLD AUTO: 0.23 K/UL (ref 0–0.51)
EOSINOPHIL NFR BLD: 1.6 % (ref 0–6.9)
ERYTHROCYTE [DISTWIDTH] IN BLOOD BY AUTOMATED COUNT: 57.1 FL (ref 35.9–50)
GLOBULIN SER CALC-MCNC: 3.2 G/DL (ref 1.9–3.5)
GLUCOSE SERPL-MCNC: 93 MG/DL (ref 65–99)
HCT VFR BLD AUTO: 41.1 % (ref 37–47)
HGB BLD-MCNC: 12.5 G/DL (ref 12–16)
IMM GRANULOCYTES # BLD AUTO: 0.08 K/UL (ref 0–0.11)
IMM GRANULOCYTES NFR BLD AUTO: 0.5 % (ref 0–0.9)
LYMPHOCYTES # BLD AUTO: 1.85 K/UL (ref 1–4.8)
LYMPHOCYTES NFR BLD: 12.6 % (ref 22–41)
MCH RBC QN AUTO: 28.2 PG (ref 27–33)
MCHC RBC AUTO-ENTMCNC: 30.4 G/DL (ref 33.6–35)
MCV RBC AUTO: 92.6 FL (ref 81.4–97.8)
MONOCYTES # BLD AUTO: 0.79 K/UL (ref 0–0.85)
MONOCYTES NFR BLD AUTO: 5.4 % (ref 0–13.4)
NEUTROPHILS # BLD AUTO: 11.69 K/UL (ref 2–7.15)
NEUTROPHILS NFR BLD: 79.7 % (ref 44–72)
NRBC # BLD AUTO: 0 K/UL
NRBC BLD-RTO: 0 /100 WBC
PLATELET # BLD AUTO: 283 K/UL (ref 164–446)
PMV BLD AUTO: 11.3 FL (ref 9–12.9)
POTASSIUM SERPL-SCNC: 4.9 MMOL/L (ref 3.6–5.5)
PROT SERPL-MCNC: 7.6 G/DL (ref 6–8.2)
RBC # BLD AUTO: 4.44 M/UL (ref 4.2–5.4)
SODIUM SERPL-SCNC: 138 MMOL/L (ref 135–145)
WBC # BLD AUTO: 14.7 K/UL (ref 4.8–10.8)

## 2020-07-14 PROCEDURE — 85025 COMPLETE CBC W/AUTO DIFF WBC: CPT

## 2020-07-14 PROCEDURE — 99283 EMERGENCY DEPT VISIT LOW MDM: CPT

## 2020-07-14 PROCEDURE — 700111 HCHG RX REV CODE 636 W/ 250 OVERRIDE (IP): Performed by: STUDENT IN AN ORGANIZED HEALTH CARE EDUCATION/TRAINING PROGRAM

## 2020-07-14 PROCEDURE — 93971 EXTREMITY STUDY: CPT | Mod: LT

## 2020-07-14 PROCEDURE — 96372 THER/PROPH/DIAG INJ SC/IM: CPT

## 2020-07-14 PROCEDURE — 80053 COMPREHEN METABOLIC PANEL: CPT

## 2020-07-14 RX ORDER — CEFAZOLIN SODIUM 1 G/3ML
1 INJECTION, POWDER, FOR SOLUTION INTRAMUSCULAR; INTRAVENOUS ONCE
Status: COMPLETED | OUTPATIENT
Start: 2020-07-14 | End: 2020-07-14

## 2020-07-14 RX ORDER — CLINDAMYCIN HYDROCHLORIDE 300 MG/1
300 CAPSULE ORAL 3 TIMES DAILY
COMMUNITY
Start: 2020-07-11 | End: 2020-07-18

## 2020-07-14 RX ORDER — CEPHALEXIN 500 MG/1
500 CAPSULE ORAL 4 TIMES DAILY
Qty: 19 CAP | Refills: 0 | Status: SHIPPED | OUTPATIENT
Start: 2020-07-14 | End: 2020-07-19

## 2020-07-14 RX ADMIN — CEFAZOLIN 1 G: 330 INJECTION, POWDER, FOR SOLUTION INTRAMUSCULAR; INTRAVENOUS at 22:28

## 2020-07-14 ASSESSMENT — FIBROSIS 4 INDEX: FIB4 SCORE: 0.39

## 2020-07-14 NOTE — ED TRIAGE NOTES
..  Chief Complaint   Patient presents with   • Leg Pain     red and increase swelling x's 7 day. painful to ambulate. pt reports she's currently on abx. but feels like it's getting worse.    • Cough     intermittent non producitve cough   ./68   Pulse 73   Temp 37 °C (98.6 °F) (Temporal)   Resp 16   Wt (!) 125.2 kg (276 lb 0.3 oz)   SpO2 94%      ..Explained triage process, to waiting room. Asked to inform RN if questions or concerns arise.

## 2020-07-15 PROCEDURE — 93971 EXTREMITY STUDY: CPT | Mod: 26 | Performed by: INTERNAL MEDICINE

## 2020-07-15 NOTE — ED NOTES
Med rec updated and complete.  Allergies reviewed.  Pt recently started on clindamycin pn 07/11/20  For a 7 day course.      Home pharmacy [facility].

## 2020-07-15 NOTE — ED PROVIDER NOTES
ED Provider Note    Scribed for Dr. Graeme Ann M.D. by Florentino Rodgers. 7/14/2020  6:37 PM    Primary care provider: SHANKAR Gleason  Means of arrival: Walk-In  History obtained from: Patient  History limited by: None    CHIEF COMPLAINT  Chief Complaint   Patient presents with   • Leg Pain     red and increase swelling x's 7 day. painful to ambulate. pt reports she's currently on abx. but feels like it's getting worse.    • Cough     intermittent non producitve cough       HPI  Lorene Haro is a 44 y.o. female who presents to the Emergency Department for evaluation of left leg pain onset seven days ago. She reports that her left leg became infected approximately three days ago. She began taking antibiotics to help treat her symptoms, with no alleviation. She states that the pain has been unbearable yesterday and today, prompting her to go to the ED. Pressure exacerbates her pain. She has associated chills, cough, redness, and swelling. She notes that the cough began yesterday, and it is nonproductive. She denies any fever. She had surgery on her left knee a few years ago. Patient denies use of alcohol, cigarettes, or drugs. She reports that she has a history of an ovarian cyst a couple of years ago, but denies any history of blood clots. Patient denies use of diuretics.     REVIEW OF SYSTEMS  Pertinent positives include left leg pain, chills, and cough. Pertinent negatives include no fever. As above, all other systems reviewed and are negative.   See HPI for further details.     PAST MEDICAL HISTORY   has a past medical history of Asthma, Bipolar 2 disorder (HCC), Chickenpox, Other psychological stress, and Schizophrenic disorder (HCC).    SURGICAL HISTORY   has a past surgical history that includes hysterectomy laparoscopy; colectomy; knee arthroscopy; and cholecystectomy.    SOCIAL HISTORY  Social History     Tobacco Use   • Smoking status: Former Smoker     Packs/day: 1.00     Years: 20.00      Pack years: 20.00     Types: Cigarettes     Last attempt to quit: 2018     Years since quittin.1   • Smokeless tobacco: Never Used   • Tobacco comment: since , trying to quit   Substance Use Topics   • Alcohol use: No   • Drug use: No      Social History     Substance and Sexual Activity   Drug Use No       FAMILY HISTORY  Family History   Problem Relation Age of Onset   • No Known Problems Mother    • Cancer Sister        CURRENT MEDICATIONS  Home Medications     Reviewed by Lauryn Briseno R.N. (Registered Nurse) on 20 at 1654  Med List Status: Not Addressed   Medication Last Dose Status   acetaminophen (TYLENOL) 325 MG Tab  Active   albuterol 108 (90 Base) MCG/ACT Aero Soln inhalation aerosol  Active   aripiprazole (ABILIFY) 30 MG tablet  Active   atorvastatin (LIPITOR) 20 MG Tab  Active   azithromycin (ZITHROMAX) 250 MG Tab  Active   benztropine (COGENTIN) 1 MG Tab  Active   cyanocobalamin (VITAMIN B12) 1000 MCG Tab  Active   fluticasone (FLONASE) 50 MCG/ACT nasal spray  Active   furosemide (LASIX) 20 MG Tab  Active   guaiFENesin ER 1200 MG TABLET SR 12 HR  Active   haloperidol (HALDOL) 5 MG Tab  Active   hydrOXYzine HCl (ATARAX) 25 MG Tab  Active   lithium CR (LITHOBID) 300 MG Tab CR  Active   methylPREDNISolone (MEDROL DOSEPAK) 4 MG Tablet Therapy Pack  Active   montelukast (SINGULAIR) 10 MG Tab  Active   naproxen (NAPROSYN) 500 MG Tab  Active   raNITidine (ZANTAC) 150 MG Tab  Active   tiotropium (SPIRIVA) 18 MCG Cap  Active   triamcinolone acetonide (KENALOG) 0.5 % Cream  Active                ALLERGIES  Allergies   Allergen Reactions   • Amoxicillin Rash       PHYSICAL EXAM  VITAL SIGNS: /68   Pulse 73   Temp 37 °C (98.6 °F) (Temporal)   Resp 16   Wt (!) 125.2 kg (276 lb 0.3 oz)   LMP  (LMP Unknown)   SpO2 94%   BMI 52.15 kg/m²     Constitutional: Well developed, Well nourished, mild distress, Non-toxic appearance.   HENT: Normocephalic, Atraumatic, Bilateral external ears  normal, Oropharynx moist, No oral exudates.   Eyes: PERRLA, EOMI, Conjunctiva normal, No discharge.   Neck: No tenderness, Supple, No stridor.    Lymphatic: No lymphadenopathy noted.   Cardiovascular: Normal heart rate, Normal rhythm.   Thorax & Lungs: Clear to auscultation bilaterally, No respiratory distress, No wheezing, No crackles.   Abdomen: Soft, No tenderness, No masses, No pulsatile masses.   Skin: Warm, Dry, No rash.   Extremities: No cyanosis.   Musculoskeletal: Pain and tenderness to the left lower leg, probable cellulitis interiorly, Intact distal pulses  Neurologic: Awake, alert. Moves all extremities spontaneously.  Psychiatric: Affect normal, Judgment normal, Mood normal.     LABS  Results for orders placed or performed during the hospital encounter of 07/14/20   Blood Culture,Hold   Result Value Ref Range    Blood Culture Hold Collected      All labs reviewed by me.    RADIOLOGY  US-EXTREMITY VENOUS LOWER UNILAT LEFT    (Results Pending)     The radiologist's interpretation of all radiological studies have been reviewed by me.    COURSE & MEDICAL DECISION MAKING  Pertinent Labs & Imaging studies reviewed. (See chart for details)    6:37 PM - Patient seen and examined at bedside. I informed the patient of my plan of care, which includes labs. Ordered blood culture to evaluate her symptoms. The differential diagnoses include but are not limited to: DVT vs cellulitis     8:02 PM - Patient was reevaluated at bedside. Ordered US extremity venous lower unilat left, CBC with diff and CMP.     PPE Note: I personally donned full PPE for all patient encounters during this visit, including being clean-shaven with an N95 respirator mask and gloves.     Scribe remained outside the patient's room and did not have any contact with the patient for the duration of patient encounter.     Decision Making:  Patient with elevated white count and continued left lower extremity pain, it has improved 2-3 hours after her  home ibuprofen without further difficulty. She will be switched from clindamycin to keflex and discharged after given IM Ancef.    {This patient was seen and evaluated by myself along with the resident physician I agree with the assessment and plan for IM Ancef followed by Keflex.  The patient has had cephalosporins in the past and does not like to have allergic reaction    FINAL IMPRESSION  1. Cellulitis of left lower extremity          Florentino HARRIS (Ade), am scribing for, and in the presence of, Graeme Ann M.D..    Electronically signed by: Florentino Rodgers (Scribe), 7/14/2020    IGraeme M.D. personally performed the services described in this documentation, as scribed by Florentino Rodgers in my presence, and it is both accurate and complete.

## 2020-07-15 NOTE — DISCHARGE INSTRUCTIONS
Patient to take antibiotics as instructed, watching closely for worsening fever or chills, or purulent drainage from her wound. Stop taking clindamycin for cellulitis.

## 2020-07-17 ENCOUNTER — HOSPITAL ENCOUNTER (EMERGENCY)
Facility: MEDICAL CENTER | Age: 45
End: 2020-07-18
Attending: EMERGENCY MEDICINE
Payer: MEDICAID

## 2020-07-17 DIAGNOSIS — L03.116 CELLULITIS OF LEFT LOWER EXTREMITY: ICD-10-CM

## 2020-07-17 DIAGNOSIS — R60.9 PERIPHERAL EDEMA: ICD-10-CM

## 2020-07-17 PROCEDURE — 99283 EMERGENCY DEPT VISIT LOW MDM: CPT

## 2020-07-17 ASSESSMENT — FIBROSIS 4 INDEX: FIB4 SCORE: 0.49

## 2020-07-17 NOTE — LETTER
Emergency Services     July 18, 2020    Patient: Lorene Haro   YOB: 1975   Date of Visit: 7/17/2020       To Whom It May Concern:    Lorene Haro was seen and treated in our emergency department on 7/17/2020.  Please excuse her from prolonged standing or climbing stairs or vigorous activity for the next week.  She needs to be resting with her legs elevated.      Sincerely,           JAI CHEN M.D.  [facility], EMERGENCY DEPT  Dept: [phone]

## 2020-07-17 NOTE — LETTER
Emergency Services     July 18, 2020    Patient: Lorene Haro   YOB: 1975   Date of Visit: 7/17/2020       To Whom It May Concern:    Lorene Haro was seen and treated in our emergency department on 7/17/2020.  Please excuse her from prolonged standing, vigorous activity, climbing stairs.  She need to be resting with her legs elevated for the next week.    Sincerely,           JAI CHEN M.D.  [facility], EMERGENCY DEPT  Dept: [phone]

## 2020-07-18 ENCOUNTER — APPOINTMENT (OUTPATIENT)
Dept: RADIOLOGY | Facility: MEDICAL CENTER | Age: 45
End: 2020-07-18
Attending: EMERGENCY MEDICINE
Payer: MEDICAID

## 2020-07-18 VITALS
OXYGEN SATURATION: 96 % | RESPIRATION RATE: 17 BRPM | HEART RATE: 62 BPM | TEMPERATURE: 97.9 F | HEIGHT: 61 IN | BODY MASS INDEX: 52.03 KG/M2 | SYSTOLIC BLOOD PRESSURE: 123 MMHG | DIASTOLIC BLOOD PRESSURE: 72 MMHG | WEIGHT: 275.57 LBS

## 2020-07-18 LAB
ALBUMIN SERPL BCP-MCNC: 4.1 G/DL (ref 3.2–4.9)
ALBUMIN/GLOB SERPL: 1.4 G/DL
ALP SERPL-CCNC: 107 U/L (ref 30–99)
ALT SERPL-CCNC: 26 U/L (ref 2–50)
ANION GAP SERPL CALC-SCNC: 12 MMOL/L (ref 7–16)
AST SERPL-CCNC: 14 U/L (ref 12–45)
BASOPHILS # BLD AUTO: 0.3 % (ref 0–1.8)
BASOPHILS # BLD: 0.03 K/UL (ref 0–0.12)
BILIRUB SERPL-MCNC: 0.5 MG/DL (ref 0.1–1.5)
BUN SERPL-MCNC: 10 MG/DL (ref 8–22)
CALCIUM SERPL-MCNC: 9 MG/DL (ref 8.5–10.5)
CHLORIDE SERPL-SCNC: 105 MMOL/L (ref 96–112)
CO2 SERPL-SCNC: 23 MMOL/L (ref 20–33)
CREAT SERPL-MCNC: 0.91 MG/DL (ref 0.5–1.4)
CRP SERPL HS-MCNC: 1.57 MG/DL (ref 0–0.75)
EOSINOPHIL # BLD AUTO: 0.18 K/UL (ref 0–0.51)
EOSINOPHIL NFR BLD: 1.6 % (ref 0–6.9)
ERYTHROCYTE [DISTWIDTH] IN BLOOD BY AUTOMATED COUNT: 57 FL (ref 35.9–50)
GLOBULIN SER CALC-MCNC: 3 G/DL (ref 1.9–3.5)
GLUCOSE SERPL-MCNC: 94 MG/DL (ref 65–99)
HCT VFR BLD AUTO: 38.4 % (ref 37–47)
HGB BLD-MCNC: 11.9 G/DL (ref 12–16)
IMM GRANULOCYTES # BLD AUTO: 0.06 K/UL (ref 0–0.11)
IMM GRANULOCYTES NFR BLD AUTO: 0.5 % (ref 0–0.9)
LYMPHOCYTES # BLD AUTO: 1.96 K/UL (ref 1–4.8)
LYMPHOCYTES NFR BLD: 17.1 % (ref 22–41)
MCH RBC QN AUTO: 28.7 PG (ref 27–33)
MCHC RBC AUTO-ENTMCNC: 31 G/DL (ref 33.6–35)
MCV RBC AUTO: 92.5 FL (ref 81.4–97.8)
MONOCYTES # BLD AUTO: 0.55 K/UL (ref 0–0.85)
MONOCYTES NFR BLD AUTO: 4.8 % (ref 0–13.4)
NEUTROPHILS # BLD AUTO: 8.66 K/UL (ref 2–7.15)
NEUTROPHILS NFR BLD: 75.7 % (ref 44–72)
NRBC # BLD AUTO: 0 K/UL
NRBC BLD-RTO: 0 /100 WBC
NT-PROBNP SERPL IA-MCNC: 326 PG/ML (ref 0–125)
PLATELET # BLD AUTO: 260 K/UL (ref 164–446)
PMV BLD AUTO: 11 FL (ref 9–12.9)
POTASSIUM SERPL-SCNC: 4.2 MMOL/L (ref 3.6–5.5)
PROT SERPL-MCNC: 7.1 G/DL (ref 6–8.2)
RBC # BLD AUTO: 4.15 M/UL (ref 4.2–5.4)
SODIUM SERPL-SCNC: 140 MMOL/L (ref 135–145)
WBC # BLD AUTO: 11.4 K/UL (ref 4.8–10.8)

## 2020-07-18 PROCEDURE — 85025 COMPLETE CBC W/AUTO DIFF WBC: CPT

## 2020-07-18 PROCEDURE — 86140 C-REACTIVE PROTEIN: CPT

## 2020-07-18 PROCEDURE — 80053 COMPREHEN METABOLIC PANEL: CPT

## 2020-07-18 PROCEDURE — 93971 EXTREMITY STUDY: CPT | Mod: LT

## 2020-07-18 PROCEDURE — A9270 NON-COVERED ITEM OR SERVICE: HCPCS | Performed by: EMERGENCY MEDICINE

## 2020-07-18 PROCEDURE — 36415 COLL VENOUS BLD VENIPUNCTURE: CPT

## 2020-07-18 PROCEDURE — 83880 ASSAY OF NATRIURETIC PEPTIDE: CPT

## 2020-07-18 PROCEDURE — 700102 HCHG RX REV CODE 250 W/ 637 OVERRIDE(OP): Performed by: EMERGENCY MEDICINE

## 2020-07-18 RX ORDER — CEPHALEXIN 500 MG/1
500 CAPSULE ORAL 4 TIMES DAILY
Qty: 20 CAP | Refills: 0 | Status: SHIPPED | OUTPATIENT
Start: 2020-07-18 | End: 2020-07-23

## 2020-07-18 RX ORDER — ACETAMINOPHEN 500 MG
1000 TABLET ORAL ONCE
Status: COMPLETED | OUTPATIENT
Start: 2020-07-18 | End: 2020-07-18

## 2020-07-18 RX ADMIN — ACETAMINOPHEN 1000 MG: 500 TABLET ORAL at 00:44

## 2020-07-18 ASSESSMENT — ENCOUNTER SYMPTOMS
COUGH: 0
EYE REDNESS: 0
SHORTNESS OF BREATH: 0
ABDOMINAL PAIN: 0
FEVER: 0
SORE THROAT: 0
FOCAL WEAKNESS: 0
HEADACHES: 1
BLURRED VISION: 0
CHILLS: 0
VOMITING: 0
BACK PAIN: 0
SEIZURES: 0
NECK PAIN: 0

## 2020-07-18 NOTE — ED PROVIDER NOTES
ED Provider Note    CHIEF COMPLAINT  Chief Complaint   Patient presents with   • Leg Pain       HPI  Lorene Haro is a 44 y.o. female with past medical history of bipolar disorder/schizophrenia, asthma who presents with left lower extremity leg pain.  Patient was seen here 4 days ago with similar complaints and diagnosed with cellulitis and discharged on Keflex.  She states she has been taking the antibiotics as directed and felt like it improved a little bit however worsened today.  She did take a long walk and feels that this may have exacerbated her symptoms.  She describes pain and redness to the anterior shin.  Pain radiates up behind the knee.  She denies any fevers, vomiting, shortness of breath, chest pain.  She has had a mild headache today.  No history of blood clots, the patient is not on anticoagulation.    REVIEW OF SYSTEMS  See HPI for further details.   Review of Systems   Constitutional: Negative for chills and fever.   HENT: Negative for sore throat.    Eyes: Negative for blurred vision and redness.   Respiratory: Negative for cough and shortness of breath.    Cardiovascular: Positive for leg swelling. Negative for chest pain.   Gastrointestinal: Negative for abdominal pain and vomiting.   Genitourinary: Negative for dysuria and urgency.   Musculoskeletal: Negative for back pain and neck pain.        Left leg pain   Skin: Negative for rash.        Redness to the left leg   Neurological: Positive for headaches. Negative for focal weakness and seizures.   Psychiatric/Behavioral: Negative for suicidal ideas.         PAST MEDICAL HISTORY   has a past medical history of Asthma, Bipolar 2 disorder (HCC), Chickenpox, Other psychological stress, and Schizophrenic disorder (HCC).    SOCIAL HISTORY  Social History     Tobacco Use   • Smoking status: Former Smoker     Packs/day: 1.00     Years: 20.00     Pack years: 20.00     Types: Cigarettes     Quit date: 2018     Years since quittin.2   •  "Smokeless tobacco: Never Used   • Tobacco comment: since 1992, trying to quit   Substance and Sexual Activity   • Alcohol use: No   • Drug use: No   • Sexual activity: Not on file       SURGICAL HISTORY   has a past surgical history that includes hysterectomy laparoscopy; colectomy; knee arthroscopy; and cholecystectomy.    CURRENT MEDICATIONS  Home Medications    **Home medications have not yet been reviewed for this encounter**         ALLERGIES  Allergies   Allergen Reactions   • Amoxicillin Rash       PHYSICAL EXAM   VITAL SIGNS: /72   Pulse 62   Temp 36.6 °C (97.9 °F) (Temporal)   Resp 17   Ht 1.549 m (5' 1\")   Wt 125 kg (275 lb 9.2 oz)   LMP  (LMP Unknown)   SpO2 96%   BMI 52.07 kg/m²      Physical Exam   Constitutional: She is oriented to person, place, and time and well-developed, well-nourished, and in no distress. No distress.   Obese disheveled middle-aged female   HENT:   Head: Normocephalic and atraumatic.   Eyes: Pupils are equal, round, and reactive to light. Conjunctivae are normal.   Neck: Normal range of motion. Neck supple.   Cardiovascular: Normal rate, regular rhythm and normal heart sounds.   Pulmonary/Chest: Effort normal and breath sounds normal. No respiratory distress.   Abdominal: Soft. She exhibits no distension. There is no abdominal tenderness.   Musculoskeletal: Normal range of motion.         General: Edema present. No tenderness.      Comments: Bilateral lower extremity nonpitting edema, mildly worse on the left than the right.  Mild redness noted to the anterior shin without significant warmth or streaking.  Good range of motion of the knee and ankle without significant pain.   Neurological: She is alert and oriented to person, place, and time.   Moving all extremities spontaneously   Skin: Skin is warm and dry.   Psychiatric: Affect normal.         DIAGNOSTIC STUDIES  LABS  Personally reviewed by me  Labs Reviewed   CBC WITH DIFFERENTIAL - Abnormal; Notable for the " "following components:       Result Value    WBC 11.4 (*)     RBC 4.15 (*)     Hemoglobin 11.9 (*)     MCHC 31.0 (*)     RDW 57.0 (*)     Neutrophils-Polys 75.70 (*)     Lymphocytes 17.10 (*)     Neutrophils (Absolute) 8.66 (*)     All other components within normal limits   COMP METABOLIC PANEL - Abnormal; Notable for the following components:    Alkaline Phosphatase 107 (*)     All other components within normal limits   PROBRAIN NATRIURETIC PEPTIDE, NT - Abnormal; Notable for the following components:    NT-proBNP 326 (*)     All other components within normal limits   CRP QUANTITIVE (NON-CARDIAC) - Abnormal; Notable for the following components:    Stat C-Reactive Protein 1.57 (*)     All other components within normal limits   ESTIMATED GFR           RADIOLOGY  Personally reviewed by me  US-EXTREMITY VENOUS LOWER UNILAT LEFT   Final Result          ED COURSE  Vitals:    07/17/20 2148 07/17/20 2156 07/18/20 0101   BP: 138/78  123/72   Pulse: 72  62   Resp: 17     Temp: 36.6 °C (97.9 °F)     TempSrc: Temporal     SpO2: 97%  96%   Weight:  125 kg (275 lb 9.2 oz)    Height:  1.549 m (5' 1\")          Medications administered:  Medications   acetaminophen (TYLENOL) tablet 1,000 mg (1,000 mg Oral Given 7/18/20 0044)       Old records personally reviewed:  Patient was seen here on 7/14 and diagnosed with cellulitis.  She had a white blood cell count of 14.7, labs were otherwise reassuring.  Ultrasound is negative for DVT.        MEDICAL DECISION MAKING  Patient with history of underlying psychiatric disorder and recent visit for treatment of lower extremity cellulitis who presents with increasing pain and swelling to the left lower extremity.  She is afebrile with normal vital signs on arrival and nontoxic-appearing.  She has no evidence of neurovascular compromise or septic arthritis on exam.  Ultrasound is again negative for DVT.  She may have mild cellulitis on her anterior shin however seems more consistent with " peripheral edema.  BNP is not significantly elevated concerning for decompensated heart failure.  She has an improving white blood cell count however her inflammatory markers are little elevated therefore will provide her with another prescription for an additional 5 days of antibiotics.    Upon reassessment, patient is resting comfortably with normal vital signs.  No new complaints at this time.  Discussed results with patient and/or family as well as importance of primary care follow up.  She understands importance of rest and elevation of her lower extremities as much as possible.  Patient understands plan of care and strict return precautions for new or changing symptoms.         IMPRESSION  (R60.9) Peripheral edema  (L03.116) Cellulitis of left lower extremity    Disposition: Discharge home, stable condition  Results, diagnoses, and treatment options were discussed with the patient and/or family. Patient verbalized understanding of plan of care.    Patient referred to primary care provider for monitoring and treatment of blood pressure.      Discharge Medication List as of 7/18/2020  1:58 AM      START taking these medications    Details   !! cephALEXin (KEFLEX) 500 MG Cap Take 1 Cap by mouth 4 times a day for 5 days., Disp-20 Cap,R-0, Print Rx Paper       !! - Potential duplicate medications found. Please discuss with provider.              Electronically signed by: Natalia South M.D., 7/18/2020 12:31 AM

## 2020-07-18 NOTE — DISCHARGE INSTRUCTIONS
You were seen in the Emergency Department for lower extremity swelling.    Labs, ultrasound were completed without significant acute abnormalities other than mild elevation of inflammatory markers from cellulitis.    Please use 1,000mg of tylenol or 600mg of ibuprofen every 6 hours as needed for pain.  Elevate legs as much as possible.  Take further antibiotics for another 5 days.    Please follow up with your primary care physician.    Return to the Emergency Department with fevers, chest pain, shortness of breath, or other concerns.

## 2020-07-18 NOTE — ED NOTES
Discharge paperwork reviewed and signed with patient. No questions at this time. Prescription given to patient. Patient wheeled out via wheelchair.

## 2020-07-18 NOTE — ED TRIAGE NOTES
"Chief Complaint   Patient presents with   • Leg Pain     43 yo female BIB EMS to triage for above complaint. Pt was seen recently for cellulitis and discharged on Keflex, pt reports 8/10 intermittent sharp pain to LLE, states \"I was walking around a lot today and think I overdid it\". CMS intact to LLE, slight redness noted to anterior shin. Pt denies chest pain or SOB.     Educated on triage process, encourage to inform staff of any changes.     /78   Pulse 72   Temp 36.6 °C (97.9 °F) (Temporal)   Resp 17   Ht 1.549 m (5' 1\")   Wt 125 kg (275 lb 9.2 oz)   LMP  (LMP Unknown)   SpO2 97%   BMI 52.07 kg/m²   "

## 2020-07-29 ENCOUNTER — HOSPITAL ENCOUNTER (EMERGENCY)
Facility: MEDICAL CENTER | Age: 45
End: 2020-07-29
Attending: EMERGENCY MEDICINE
Payer: MEDICAID

## 2020-07-29 ENCOUNTER — APPOINTMENT (OUTPATIENT)
Dept: RADIOLOGY | Facility: MEDICAL CENTER | Age: 45
End: 2020-07-29
Payer: MEDICAID

## 2020-07-29 VITALS
RESPIRATION RATE: 19 BRPM | SYSTOLIC BLOOD PRESSURE: 114 MMHG | OXYGEN SATURATION: 91 % | HEIGHT: 61 IN | DIASTOLIC BLOOD PRESSURE: 50 MMHG | BODY MASS INDEX: 51.92 KG/M2 | TEMPERATURE: 97.8 F | WEIGHT: 275 LBS | HEART RATE: 83 BPM

## 2020-07-29 DIAGNOSIS — J44.1 CHRONIC OBSTRUCTIVE PULMONARY DISEASE WITH ACUTE EXACERBATION (HCC): ICD-10-CM

## 2020-07-29 LAB
ALBUMIN SERPL BCP-MCNC: 4.2 G/DL (ref 3.2–4.9)
ALBUMIN/GLOB SERPL: 1.4 G/DL
ALP SERPL-CCNC: 106 U/L (ref 30–99)
ALT SERPL-CCNC: 18 U/L (ref 2–50)
ANION GAP SERPL CALC-SCNC: 14 MMOL/L (ref 7–16)
AST SERPL-CCNC: 13 U/L (ref 12–45)
BASOPHILS # BLD AUTO: 0.1 % (ref 0–1.8)
BASOPHILS # BLD: 0.02 K/UL (ref 0–0.12)
BILIRUB SERPL-MCNC: 0.6 MG/DL (ref 0.1–1.5)
BUN SERPL-MCNC: 16 MG/DL (ref 8–22)
CALCIUM SERPL-MCNC: 9.8 MG/DL (ref 8.5–10.5)
CHLORIDE SERPL-SCNC: 103 MMOL/L (ref 96–112)
CO2 SERPL-SCNC: 23 MMOL/L (ref 20–33)
COVID ORDER STATUS COVID19: NORMAL
CREAT SERPL-MCNC: 0.99 MG/DL (ref 0.5–1.4)
EKG IMPRESSION: NORMAL
EOSINOPHIL # BLD AUTO: 0.12 K/UL (ref 0–0.51)
EOSINOPHIL NFR BLD: 0.9 % (ref 0–6.9)
ERYTHROCYTE [DISTWIDTH] IN BLOOD BY AUTOMATED COUNT: 54.1 FL (ref 35.9–50)
GLOBULIN SER CALC-MCNC: 2.9 G/DL (ref 1.9–3.5)
GLUCOSE SERPL-MCNC: 97 MG/DL (ref 65–99)
HCT VFR BLD AUTO: 37.2 % (ref 37–47)
HGB BLD-MCNC: 11.6 G/DL (ref 12–16)
IMM GRANULOCYTES # BLD AUTO: 0.06 K/UL (ref 0–0.11)
IMM GRANULOCYTES NFR BLD AUTO: 0.4 % (ref 0–0.9)
LYMPHOCYTES # BLD AUTO: 1.54 K/UL (ref 1–4.8)
LYMPHOCYTES NFR BLD: 11.4 % (ref 22–41)
MCH RBC QN AUTO: 28.6 PG (ref 27–33)
MCHC RBC AUTO-ENTMCNC: 31.2 G/DL (ref 33.6–35)
MCV RBC AUTO: 91.9 FL (ref 81.4–97.8)
MONOCYTES # BLD AUTO: 0.61 K/UL (ref 0–0.85)
MONOCYTES NFR BLD AUTO: 4.5 % (ref 0–13.4)
NEUTROPHILS # BLD AUTO: 11.2 K/UL (ref 2–7.15)
NEUTROPHILS NFR BLD: 82.7 % (ref 44–72)
NRBC # BLD AUTO: 0 K/UL
NRBC BLD-RTO: 0 /100 WBC
PLATELET # BLD AUTO: 300 K/UL (ref 164–446)
PMV BLD AUTO: 11.2 FL (ref 9–12.9)
POTASSIUM SERPL-SCNC: 3.8 MMOL/L (ref 3.6–5.5)
PROT SERPL-MCNC: 7.1 G/DL (ref 6–8.2)
RBC # BLD AUTO: 4.05 M/UL (ref 4.2–5.4)
SODIUM SERPL-SCNC: 140 MMOL/L (ref 135–145)
TROPONIN T SERPL-MCNC: 20 NG/L (ref 6–19)
TROPONIN T SERPL-MCNC: 21 NG/L (ref 6–19)
WBC # BLD AUTO: 13.6 K/UL (ref 4.8–10.8)

## 2020-07-29 PROCEDURE — 80053 COMPREHEN METABOLIC PANEL: CPT

## 2020-07-29 PROCEDURE — 84484 ASSAY OF TROPONIN QUANT: CPT

## 2020-07-29 PROCEDURE — 93005 ELECTROCARDIOGRAM TRACING: CPT | Performed by: EMERGENCY MEDICINE

## 2020-07-29 PROCEDURE — 94640 AIRWAY INHALATION TREATMENT: CPT

## 2020-07-29 PROCEDURE — 99284 EMERGENCY DEPT VISIT MOD MDM: CPT

## 2020-07-29 PROCEDURE — 71045 X-RAY EXAM CHEST 1 VIEW: CPT

## 2020-07-29 PROCEDURE — 700101 HCHG RX REV CODE 250: Performed by: EMERGENCY MEDICINE

## 2020-07-29 PROCEDURE — C9803 HOPD COVID-19 SPEC COLLECT: HCPCS | Performed by: EMERGENCY MEDICINE

## 2020-07-29 PROCEDURE — 85025 COMPLETE CBC W/AUTO DIFF WBC: CPT

## 2020-07-29 PROCEDURE — U0003 INFECTIOUS AGENT DETECTION BY NUCLEIC ACID (DNA OR RNA); SEVERE ACUTE RESPIRATORY SYNDROME CORONAVIRUS 2 (SARS-COV-2) (CORONAVIRUS DISEASE [COVID-19]), AMPLIFIED PROBE TECHNIQUE, MAKING USE OF HIGH THROUGHPUT TECHNOLOGIES AS DESCRIBED BY CMS-2020-01-R: HCPCS

## 2020-07-29 RX ORDER — ALBUTEROL SULFATE 90 UG/1
2 AEROSOL, METERED RESPIRATORY (INHALATION) EVERY 6 HOURS PRN
Qty: 8.5 G | Refills: 1 | Status: ON HOLD | OUTPATIENT
Start: 2020-07-29 | End: 2020-11-10

## 2020-07-29 RX ORDER — FUROSEMIDE 20 MG/1
20 TABLET ORAL DAILY
Qty: 30 TAB | Refills: 1 | Status: ON HOLD | OUTPATIENT
Start: 2020-07-29 | End: 2020-11-10

## 2020-07-29 RX ORDER — IPRATROPIUM BROMIDE AND ALBUTEROL SULFATE 2.5; .5 MG/3ML; MG/3ML
3 SOLUTION RESPIRATORY (INHALATION) EVERY 6 HOURS PRN
Qty: 60 EACH | Refills: 1 | Status: ON HOLD | OUTPATIENT
Start: 2020-07-29 | End: 2021-04-03

## 2020-07-29 RX ORDER — ATORVASTATIN CALCIUM 20 MG/1
20 TABLET, FILM COATED ORAL DAILY
Qty: 30 TAB | Refills: 1 | Status: SHIPPED | OUTPATIENT
Start: 2020-07-29 | End: 2021-10-06

## 2020-07-29 RX ORDER — TIOTROPIUM BROMIDE 18 UG/1
18 CAPSULE ORAL; RESPIRATORY (INHALATION) DAILY
Qty: 30 CAP | Refills: 3 | Status: SHIPPED | OUTPATIENT
Start: 2020-07-29 | End: 2021-12-16 | Stop reason: SDUPTHER

## 2020-07-29 RX ORDER — MONTELUKAST SODIUM 10 MG/1
10 TABLET ORAL DAILY
Qty: 30 TAB | Refills: 1 | Status: ON HOLD | OUTPATIENT
Start: 2020-07-29 | End: 2020-11-10

## 2020-07-29 RX ADMIN — IPRATROPIUM BROMIDE 0.5 MG: 0.5 SOLUTION RESPIRATORY (INHALATION) at 19:18

## 2020-07-29 RX ADMIN — ALBUTEROL SULFATE 2.5 MG: 5 SOLUTION RESPIRATORY (INHALATION) at 19:16

## 2020-07-29 ASSESSMENT — FIBROSIS 4 INDEX: FIB4 SCORE: 0.49

## 2020-07-29 NOTE — LETTER
7/31/2020               [facility] [address] [address]        Dear Lorene (MR#[id number]),    This letter is to inform you that your COVID-19 test result is NEGATIVE.  This means that the virus that causes COVID-19 was not found in your sample.      A review of your test during your recent visit requires that we notify you of the following:    Your nasal swab was negative for the novel coronavirus (COVID-19).     You are encouraged to stay at home until you have had no fever for 72 hours without the use of fever reducing medications and your symptoms are improving. There is no need for further self-quarantine for 14 days for COVID-19 unless otherwise directed by the Health Department.     For any further questions regarding COVID-19, please contact the [facility] at [phone].  Thank you for your cooperation in the matter.      Sincerely,      [facility]

## 2020-07-30 ENCOUNTER — HOSPITAL ENCOUNTER (EMERGENCY)
Facility: MEDICAL CENTER | Age: 45
End: 2020-07-31
Attending: EMERGENCY MEDICINE
Payer: MEDICAID

## 2020-07-30 DIAGNOSIS — J44.1 ACUTE EXACERBATION OF CHRONIC OBSTRUCTIVE PULMONARY DISEASE (COPD) (HCC): ICD-10-CM

## 2020-07-30 LAB
EKG IMPRESSION: NORMAL
SARS-COV-2 RNA RESP QL NAA+PROBE: NOTDETECTED
SPECIMEN SOURCE: NORMAL

## 2020-07-30 PROCEDURE — 93005 ELECTROCARDIOGRAM TRACING: CPT | Performed by: EMERGENCY MEDICINE

## 2020-07-30 PROCEDURE — 700101 HCHG RX REV CODE 250: Performed by: EMERGENCY MEDICINE

## 2020-07-30 PROCEDURE — 99285 EMERGENCY DEPT VISIT HI MDM: CPT

## 2020-07-30 PROCEDURE — 93005 ELECTROCARDIOGRAM TRACING: CPT

## 2020-07-30 PROCEDURE — 36415 COLL VENOUS BLD VENIPUNCTURE: CPT

## 2020-07-30 PROCEDURE — 94640 AIRWAY INHALATION TREATMENT: CPT

## 2020-07-30 PROCEDURE — 96374 THER/PROPH/DIAG INJ IV PUSH: CPT

## 2020-07-30 RX ORDER — IPRATROPIUM BROMIDE AND ALBUTEROL SULFATE 2.5; .5 MG/3ML; MG/3ML
3 SOLUTION RESPIRATORY (INHALATION) ONCE
Status: COMPLETED | OUTPATIENT
Start: 2020-07-30 | End: 2020-07-30

## 2020-07-30 RX ORDER — IPRATROPIUM BROMIDE AND ALBUTEROL SULFATE 2.5; .5 MG/3ML; MG/3ML
3 SOLUTION RESPIRATORY (INHALATION) ONCE
Status: COMPLETED | OUTPATIENT
Start: 2020-07-31 | End: 2020-07-31

## 2020-07-30 RX ADMIN — IPRATROPIUM BROMIDE AND ALBUTEROL SULFATE 3 ML: .5; 3 SOLUTION RESPIRATORY (INHALATION) at 23:25

## 2020-07-30 ASSESSMENT — FIBROSIS 4 INDEX: FIB4 SCORE: 0.47

## 2020-07-30 NOTE — ED NOTES
"Pt discharged home. IV discontinued and gauze placed, pt in possession of belongings. Pt provided discharge education and information pertaining to medications and follow up appointments. Pt received copy of discharge instructions and verbalized understanding. /50   Pulse 83   Temp 36.6 °C (97.8 °F) (Oral)   Resp 19   Ht 1.549 m (5' 1\")   Wt 124.7 kg (275 lb)   LMP  (LMP Unknown)   SpO2 91%   Breastfeeding No   BMI 51.96 kg/m²     "

## 2020-07-30 NOTE — ED PROVIDER NOTES
ED Provider Note    CHIEF COMPLAINT  Chief Complaint   Patient presents with   • Shortness of Breath     Pt reports increasing SOB started today. Hx COPD.       HPI  Lorene Haro is a 44 y.o. female who presents to the emergency room today by ambulance with shortness of breath and chest pressure.  Patient states this started today around 9:00 AM.  She has had a history of COPD/asthma and feels similar.  She denies any fever chills although she states she is coughing with some yellow phlegm.  She has a history of schizophrenia and bipolar disorder has had frequent visits to the emergency room.  By the time she arrived here the chest pain had resolved.  She continues to have some shortness of breath and had apparently ran out of most of her medications.    REVIEW OF SYSTEMS  See HPI for further details. All other systems are negative.     PAST MEDICAL HISTORY  Past Medical History:   Diagnosis Date   • Asthma    • Bipolar 2 disorder (HCC)    • Chickenpox    • Other psychological stress    • Schizophrenic disorder (HCC)        FAMILY HISTORY  [unfilled]    SOCIAL HISTORY  Social History     Socioeconomic History   • Marital status: Single     Spouse name: Not on file   • Number of children: Not on file   • Years of education: Not on file   • Highest education level: Not on file   Occupational History   • Not on file   Social Needs   • Financial resource strain: Not hard at all   • Food insecurity     Worry: Never true     Inability: Never true   • Transportation needs     Medical: No     Non-medical: No   Tobacco Use   • Smoking status: Former Smoker     Packs/day: 1.00     Years: 20.00     Pack years: 20.00     Types: Cigarettes     Last attempt to quit: 2018     Years since quittin.2   • Smokeless tobacco: Never Used   • Tobacco comment: since , trying to quit   Substance and Sexual Activity   • Alcohol use: No   • Drug use: No   • Sexual activity: Not on file   Lifestyle   • Physical activity      "Days per week: Not on file     Minutes per session: Not on file   • Stress: Not on file   Relationships   • Social connections     Talks on phone: Not on file     Gets together: Not on file     Attends [employer] service: Not on file     Active member of club or organization: Not on file     Attends meetings of clubs or organizations: Not on file     Relationship status: Not on file   • Intimate partner violence     Fear of current or ex partner: Not on file     Emotionally abused: Not on file     Physically abused: Not on file     Forced sexual activity: Not on file   Other Topics Concern   • Not on file   Social History Narrative    From [place]       SURGICAL HISTORY  Past Surgical History:   Procedure Laterality Date   • CHOLECYSTECTOMY     • COLECTOMY     • HYSTERECTOMY LAPAROSCOPY     • KNEE ARTHROSCOPY         CURRENT MEDICATIONS  Home Medications     Reviewed by Merry Saunders R.N. (Registered Nurse) on 07/29/20 at 1813  Med List Status: <None>   Medication Last Dose Status   acetaminophen (TYLENOL) 325 MG Tab  Active   albuterol 108 (90 Base) MCG/ACT Aero Soln inhalation aerosol  Active   aripiprazole (ABILIFY) 30 MG tablet  Active   atorvastatin (LIPITOR) 20 MG Tab  Active   benztropine (COGENTIN) 1 MG Tab  Active   cyanocobalamin (VITAMIN B12) 1000 MCG Tab  Active   fluticasone (FLONASE) 50 MCG/ACT nasal spray  Active   furosemide (LASIX) 20 MG Tab  Active   haloperidol (HALDOL) 5 MG Tab  Active   hydrOXYzine HCl (ATARAX) 25 MG Tab  Active   lithium CR (LITHOBID) 300 MG Tab CR  Active   montelukast (SINGULAIR) 10 MG Tab  Active   tiotropium (SPIRIVA) 18 MCG Cap  Active                ALLERGIES  Allergies   Allergen Reactions   • Amoxicillin Rash       PHYSICAL EXAM  VITAL SIGNS: /50   Pulse 83   Temp 36.6 °C (97.8 °F) (Oral)   Resp 19   Ht 1.549 m (5' 1\")   Wt 124.7 kg (275 lb)   LMP  (LMP Unknown)   SpO2 91%   Breastfeeding No   BMI 51.96 kg/m²       Constitutional: Well developed, Well " nourished,  acute distress, Non-toxic appearance.   HENT: Normocephalic, Atraumatic, Bilateral external ears normal, Oropharynx moist, No oral exudates, Nose normal.   Eyes: PERRLA, EOMI, Conjunctiva normal, No discharge.   Neck: Normal range of motion, No tenderness, Supple, No stridor.   Lymphatic: No lymphadenopathy noted.   Cardiovascular: Normal heart rate, Normal rhythm, No murmurs, No rubs, No gallops.   Thorax & Lungs: Normal breath sounds, No respiratory distress,  wheezing which cleared with treatment, No chest tenderness.   Abdomen: Bowel sounds normal, Soft, No tenderness, No masses, No pulsatile masses.   Skin: Warm, Dry, No erythema, No rash.   Back: No tenderness, No CVA tenderness.   Extremities: Intact distal pulses, normal bilateral lower extremity edema, No tenderness, No cyanosis, No clubbing.   Musculoskeletal: Good range of motion in all major joints. No tenderness to palpation or major deformities noted.   Neurologic: Alert & oriented x 3, Normal motor function, Normal sensory function, No focal deficits noted.  3 of bipolar disorder and schizophrenia denies any suicidal homicidal ideation  Psychiatric: Affect normal, Judgment normal, Mood normal.     EKG  Normal sinus rhythm at 80 bpm, no ST elevation or depression, no widening of the QRS complex, good R wave progression, LA interval's are normal, no diagnostic Q waves noted.  This is a 12-lead EKG there is no previous EKG available to staff for comparison.    RADIOLOGY/PROCEDURES  DX-CHEST-PORTABLE (1 VIEW)   Final Result      No acute cardiac or pulmonary abnormality is noted. Stable borderline cardiac enlargement.            COURSE & MEDICAL DECISION MAKING  Pertinent Labs & Imaging studies reviewed. (See chart for details)  Given breathing treatment with resolution her symptoms refilled on her medications.  Which include her albuterol will follow-up with her primary care physician.  Chest x-ray shows no acute process.  EKG shows no  evidence of ischemia and she had to troponins performed 2 hours apart I do not feel that this is ischemia or chest pain caused by ischemia more likely from her asthma she has now resolved and she feels much improved she is released in stable improved condition as above to home.    FINAL IMPRESSION  1.  Acute exacerbation of asthma  2.  Chest wall pain  3.  Bipolar disorder by history         Electronically signed by: Leo Garcia D.O., 7/30/2020 1:58 AM

## 2020-07-30 NOTE — ED TRIAGE NOTES
"Chief Complaint   Patient presents with   • Shortness of Breath     Pt reports increasing SOB started today. Hx COPD.     BP (!) 177/72   Pulse 73   Temp 36.6 °C (97.8 °F) (Oral)   Resp (!) 22   Ht 1.549 m (5' 1\")   Wt 124.7 kg (275 lb)   LMP  (LMP Unknown)   SpO2 93%   Breastfeeding No   BMI 51.96 kg/m²     Pt brought in by [facility] from home, PIV established pta. Alb x1 en-route. Placed in room GR 24. Complaints and vitals as above. Pt on monitors, all alarms audible. Chart flagged for ERP to see.  "

## 2020-07-31 VITALS
WEIGHT: 275 LBS | BODY MASS INDEX: 51.92 KG/M2 | TEMPERATURE: 97.5 F | RESPIRATION RATE: 19 BRPM | HEIGHT: 61 IN | HEART RATE: 79 BPM | DIASTOLIC BLOOD PRESSURE: 61 MMHG | SYSTOLIC BLOOD PRESSURE: 152 MMHG | OXYGEN SATURATION: 93 %

## 2020-07-31 PROCEDURE — 700111 HCHG RX REV CODE 636 W/ 250 OVERRIDE (IP): Performed by: EMERGENCY MEDICINE

## 2020-07-31 PROCEDURE — 94640 AIRWAY INHALATION TREATMENT: CPT

## 2020-07-31 PROCEDURE — 700101 HCHG RX REV CODE 250: Performed by: EMERGENCY MEDICINE

## 2020-07-31 RX ORDER — DEXAMETHASONE SODIUM PHOSPHATE 10 MG/ML
10 INJECTION, SOLUTION INTRAMUSCULAR; INTRAVENOUS ONCE
Status: COMPLETED | OUTPATIENT
Start: 2020-07-31 | End: 2020-07-31

## 2020-07-31 RX ADMIN — IPRATROPIUM BROMIDE AND ALBUTEROL SULFATE 3 ML: .5; 3 SOLUTION RESPIRATORY (INHALATION) at 00:20

## 2020-07-31 RX ADMIN — DEXAMETHASONE SODIUM PHOSPHATE 10 MG: 10 INJECTION INTRAMUSCULAR; INTRAVENOUS at 01:22

## 2020-07-31 NOTE — ED NOTES
COVID-19 Test Follow Up  07/31/20      Results for ADELINA MILLAN (MRN [id number]) as of 7/31/2020 07:10   7/29/2020 20:20   COVID Order Status Discharge-Recvd   SARS-CoV-2 by PCR NotDetected   SARS-CoV-2 Source NP Swab     Patient tested negative for COVID-19 - results were posted 7 hours prior to pt's repeat visit to the ED on 7.30. Pt was informed she was COVID negative and diagnosed with a COPD exacerbation by the ERP on 7.30. Letter sent to patient regarding negative COVID results. She was encouraged to stay at home until no fever for 72 hours without the use of fever reducing medications and symptoms improving. Informed there is no need to further self-isolate for 14 days for COVID-19 unless otherwise directed by the Health Dept.     They are advised to return to the ER for worsening symptoms including difficulty breathing, ongoing fever, weakness or chest pain.    Leanna Fung, PharmD

## 2020-07-31 NOTE — ED NOTES
Discharge summary completed with patient. PIV removed. Patient education completed regarding follow up care. Patient wheeled out to lobby with [employer]. All belongings sent home with patient.

## 2020-07-31 NOTE — ED PROVIDER NOTES
ED Provider Note    CHIEF COMPLAINT  Chief Complaint   Patient presents with   • Shortness of Breath     x 2.5 hrs       HPI  Lorene Haro is a 44 y.o. female who presents shortness of breath for the past 2-1/2 hours or so.  She was at the [facility] at the time.  She was actually seen here earlier in the in the day overnight last night for suspected COPD exacerbation.  She states that since she left, she used her CPAP machine at home to sleep at night and was feeling fine in the morning.  She continues to smoke cigarettes during the day and was at the [facility] when she was overcome with anxiety and shortness of breath.  EMS arrived on scene and found the patient to be hypoxic to 80% on room air.  Had bilateral wheezing.  No fever or cough.  No known sick contacts.  I reviewed the patient's medical record from earlier this morning.  Had COVID swab done that was negative.    The patient was discharged home with a prescription for albuterol and instructions to follow-up with primary care physician.  Chest x-ray is unremarkable.  EKG and troponin were unremarkable.    REVIEW OF SYSTEMS  See HPI for further details. All other systems are negative.     PAST MEDICAL HISTORY   has a past medical history of Asthma, Bipolar 2 disorder ([facility]), Chickenpox, Other psychological stress, and Schizophrenic disorder ([facility]).    SOCIAL HISTORY  Social History     Tobacco Use   • Smoking status: Former Smoker     Packs/day: 1.00     Years: 20.00     Pack years: 20.00     Types: Cigarettes     Last attempt to quit: 2018     Years since quittin.2   • Smokeless tobacco: Never Used   • Tobacco comment: since , trying to quit   Substance and Sexual Activity   • Alcohol use: No   • Drug use: No   • Sexual activity: Not on file       SURGICAL HISTORY   has a past surgical history that includes hysterectomy laparoscopy; colectomy; knee arthroscopy; and cholecystectomy.    CURRENT MEDICATIONS  Home Medications    **Home medications  "have not yet been reviewed for this encounter**         ALLERGIES  Allergies   Allergen Reactions   • Amoxicillin Rash       PHYSICAL EXAM  VITAL SIGNS: /71   Pulse 86   Temp 36.6 °C (97.9 °F) (Temporal)   Resp (!) 22   Ht 1.549 m (5' 1\")   Wt 124.7 kg (275 lb)   LMP  (LMP Unknown)   SpO2 97%   BMI 51.96 kg/m²   Pulse ox interpretation: I interpret this pulse ox as normal.  Constitutional: Alert in no apparent distress.  HENT: No signs of trauma, Bilateral external ears normal, Nose normal.   Eyes: Pupils are equal and reactive, Conjunctiva normal, Non-icteric.   Neck: Normal range of motion, No tenderness, Supple, No stridor.   Cardiovascular: Regular rate and rhythm.   Thorax & Lungs: Normal breath sounds, No respiratory distress, No wheezing, No chest tenderness.   Abdomen: Bowel sounds normal, Soft, No tenderness, No masses, No pulsatile masses. No peritoneal signs.  Skin: Warm, Dry, No erythema, No rash.   Back: No bony tenderness, No CVA tenderness.   Extremities: Intact distal pulses, No edema, No tenderness, No cyanosis  Musculoskeletal: Good range of motion in all major joints. No tenderness to palpation or major deformities noted.   Neurologic: Alert, Normal motor function and gait, Normal sensory function, No focal deficits noted.   Psychiatric: Affect normal, Judgment normal, Mood normal.       DIAGNOSTIC STUDIES / PROCEDURES    EKG - Physician interpretation  Results for orders placed or performed during the hospital encounter of 20   EKG   Result Value Ref Range    Report       [facility] Emergency Dept.    Test Date:  2020  Pt Name:    ADELINA MILLAN                Department: ER  MRN:        [id number]                      Room:       [facility]  Gender:     Female                       Technician: [id number]  :        1975                   Requested By:ER TRIAGE PROTOCOL  Order #:    [id number]                    Reading MD: SHARMILA ROBB, " MD    Measurements  Intervals                                Axis  Rate:       79                           P:          47  VT:         140                          QRS:        55  QRSD:       94                           T:          18  QT:         384  QTc:        441    Interpretive Statements  SINUS RHYTHM  Compared to ECG 07/29/2020 18:25:14  Electronically Signed On 7- 22:18:45 PDT by SHARMILA ROBB MD         COURSE & MEDICAL DECISION MAKING    Medications   ipratropium-albuterol (DUONEB) nebulizer solution (3 mL Nebulization Given 7/30/20 4825)   ipratropium-albuterol (DUONEB) nebulizer solution (3 mL Nebulization Given 7/31/20 0020)   dexamethasone pf (DECADRON) injection 10 mg (10 mg Intravenous Given 7/31/20 0122)       Pertinent Labs & Imaging studies reviewed. (See chart for details)  44 y.o. [place] and shortness of breath.  Has wheezing bilaterally.  Was seen here earlier today and had coronavirus testing.  This was negative.  She was seen earlier this morning approximately 20 hours ago for similar symptoms where she was treated for COPD exacerbation.  She was given an inhaler to go home with.  Wheezing improved with 2 wheezing treatments here.  Patient is no longer hypoxic and is resting comfortably.  Has slight tremulousness following albuterol administration however.  Patient states that she feels more comfortable and so she is is stable for discharge.    During the patient's stay, I did review the patient's medical record from her previous stay about 20 hours ago.  Chest x-ray was unremarkable.  EKG and troponins were unremarkable as well.    The patient will not drink alcohol nor drive with prescribed medications.   The patient was instructed to follow-up with primary care physician for further management.  To return immediately for any worsening symptoms or development of any other concerning signs or symptoms. The patient verbalizes understanding in  "their own words.    /59   Pulse 80   Temp 36.4 °C (97.5 °F) (Temporal)   Resp 20   Ht 1.549 m (5' 1\")   Wt 124.7 kg (275 lb)   LMP  (LMP Unknown)   SpO2 95%   BMI 51.96 kg/m²     The patient was referred to primary care where they will receive further BP management.      LESTER GleasonPDESIREE  [address] [place] [address] [id number] [phone]    Schedule an appointment as soon as possible for a visit       [facility], Emergency Dept  [facility] [id number] [phone]    As needed, If symptoms worsen      FINAL IMPRESSION  1. Acute exacerbation of chronic obstructive pulmonary disease (COPD) (HCC)            Electronically signed by: Graeme Salvador M.D., 7/30/2020 10:08 PM    "

## 2020-07-31 NOTE — ED TRIAGE NOTES
"Chief Complaint   Patient presents with   • Shortness of Breath     x 2.5 hrs       Pulse 86   Temp 36.6 °C (97.9 °F) (Temporal)   Resp (!) 22   Ht 1.549 m (5' 1\")   Wt 124.7 kg (275 lb)   LMP  (LMP Unknown)   SpO2 97%   BMI 51.96 kg/m²     Patient BIB EMS. With complaints listed above. Per EMS patient was picked up form the [place] for SOB. Patient stated she feels like she's having a panic attack. Patient's SPO2 was 80% on EMS arrival. EMS placed patient on 4LO2. SPO2 came up to 97%. Patient reported that she does wear a CPAP at night.  PIV started en route. Chart marked for ERP. Patient provided with call light, and educated to call for assistance.     "

## 2020-08-28 ENCOUNTER — SLEEP CENTER VISIT (OUTPATIENT)
Dept: SLEEP MEDICINE | Facility: MEDICAL CENTER | Age: 45
End: 2020-08-28
Payer: MEDICAID

## 2020-08-28 VITALS
HEART RATE: 62 BPM | HEIGHT: 62 IN | BODY MASS INDEX: 50.61 KG/M2 | WEIGHT: 275 LBS | SYSTOLIC BLOOD PRESSURE: 112 MMHG | RESPIRATION RATE: 16 BRPM | OXYGEN SATURATION: 94 % | DIASTOLIC BLOOD PRESSURE: 72 MMHG

## 2020-08-28 DIAGNOSIS — J44.9 OSA AND COPD OVERLAP SYNDROME (HCC): ICD-10-CM

## 2020-08-28 DIAGNOSIS — J44.9 CHRONIC OBSTRUCTIVE PULMONARY DISEASE, UNSPECIFIED COPD TYPE (HCC): ICD-10-CM

## 2020-08-28 DIAGNOSIS — Z72.0 TOBACCO USE: ICD-10-CM

## 2020-08-28 DIAGNOSIS — G47.33 OSA AND COPD OVERLAP SYNDROME (HCC): ICD-10-CM

## 2020-08-28 DIAGNOSIS — E66.01 CLASS 3 SEVERE OBESITY DUE TO EXCESS CALORIES WITH SERIOUS COMORBIDITY AND BODY MASS INDEX (BMI) OF 50.0 TO 59.9 IN ADULT (HCC): ICD-10-CM

## 2020-08-28 PROCEDURE — 99214 OFFICE O/P EST MOD 30 MIN: CPT | Performed by: INTERNAL MEDICINE

## 2020-08-28 ASSESSMENT — FIBROSIS 4 INDEX: FIB4 SCORE: 0.47

## 2020-08-28 NOTE — PROGRESS NOTES
"Chief Complaint   Patient presents with   • Follow-Up     KATHIA, patient needs clearence for endoscopy ([facility])     HPI: This patient is a 44 y.o. Female who returns for sleep apnea and COPD.   Pulmonary function testing showed restrictive physiology (secondary to obesity ) with total lung capacity: 77%, FVC of 42% with bronchodilator response.  Her diffusion capacity was 87%.  Her BMI is >54.  She uses Spiriva daily.  Echocardiography showed normal left ventricular systolic function with RVSP estimated at 60 mmHg. In laboratory polysomnography showed very severe KATHIA with  events per hour, associated with desaturations to a marina of 79%.  She failed CPAP and BiPAP titration. She underwent AVAPS titration with final pressure EPAP: 11 cm of water, IPAP: 24/15 cm of water, volume: 450 mL, rate of 14, and 3 L oxygen bleed in. Compliance card shows 93% CPAP usage for 6.5 hours nightly.  Her average AHI  is 4.9.    Her fatigue and sleepiness have improved significantly on ASV therapy.  Over the past 6 months she had been to the emergency room twice for dyspnea related to \"panic attacks\".  X-ray showed no acute process.  COVID-19 testing was negative.  She quit smoking 2 weeks ago.  Denies cough, wheezing, chest tightness or edema.  States she requires clearance for EGD and colonoscopy at [facility].  She cannot recall the name of her gastroenterologist.    Past Medical History:   Diagnosis Date   • Asthma    • Bipolar 2 disorder (HCC)    • Chickenpox    • Other psychological stress    • Schizophrenic disorder (HCC)        Social History     Socioeconomic History   • Marital status: Single     Spouse name: Not on file   • Number of children: Not on file   • Years of education: Not on file   • Highest education level: Not on file   Occupational History   • Not on file   Social Needs   • Financial resource strain: Not hard at all   • Food insecurity     Worry: Never true     Inability: Never true   • " Transportation needs     Medical: No     Non-medical: No   Tobacco Use   • Smoking status: Former Smoker     Packs/day: 1.00     Years: 20.00     Pack years: 20.00     Types: Cigarettes     Quit date: 2018     Years since quittin.2   • Smokeless tobacco: Never Used   • Tobacco comment: since , trying to quit   Substance and Sexual Activity   • Alcohol use: No   • Drug use: No   • Sexual activity: Not on file   Lifestyle   • Physical activity     Days per week: Not on file     Minutes per session: Not on file   • Stress: Not on file   Relationships   • Social connections     Talks on phone: Not on file     Gets together: Not on file     Attends [employer] service: Not on file     Active member of club or organization: Not on file     Attends meetings of clubs or organizations: Not on file     Relationship status: Not on file   • Intimate partner violence     Fear of current or ex partner: Not on file     Emotionally abused: Not on file     Physically abused: Not on file     Forced sexual activity: Not on file   Other Topics Concern   • Not on file   Social History Narrative    From [place]       Family History   Problem Relation Age of Onset   • No Known Problems Mother    • Cancer Sister        Current Outpatient Medications on File Prior to Visit   Medication Sig Dispense Refill   • albuterol 108 (90 Base) MCG/ACT Aero Soln inhalation aerosol Inhale 2 Puffs by mouth every 6 hours as needed for Shortness of Breath. 8.5 g 1   • montelukast (SINGULAIR) 10 MG Tab Take 1 Tab by mouth every day. 30 Tab 1   • tiotropium (SPIRIVA HANDIHALER) 18 MCG Cap Inhale 1 Cap by mouth every day. 30 Cap 3   • ipratropium-albuterol (DUONEB) 0.5-2.5 (3) MG/3ML nebulizer solution 3 mL by Nebulization route every 6 hours as needed for Shortness of Breath. 60 Each 1   • acetaminophen (TYLENOL) 325 MG Tab Take 2 Tabs by mouth every 6 hours as needed (Mild Pain; (Pain scale 1-3); Temp greater than 100.5 F). 30 Tab 0   • furosemide  (LASIX) 20 MG Tab Take 1 Tab by mouth every day. 30 Tab 0   • cyanocobalamin (VITAMIN B12) 1000 MCG Tab Take 1 Tab by mouth every day. 30 Tab    • atorvastatin (LIPITOR) 20 MG Tab Take 20 mg by mouth every bedtime.     • benztropine (COGENTIN) 1 MG Tab Take 1 mg by mouth every bedtime.     • fluticasone (FLONASE) 50 MCG/ACT nasal spray Spray 1 Spray in nose 2 Times a Day.     • haloperidol (HALDOL) 5 MG Tab Take 5 mg by mouth every bedtime.     • hydrOXYzine HCl (ATARAX) 25 MG Tab Take 25 mg by mouth 3 times a day as needed for Anxiety.     • lithium CR (LITHOBID) 300 MG Tab CR Take 300-600 mg by mouth 2 Times a Day. 300 mg in AM and 600 mg at bedtime     • albuterol 108 (90 Base) MCG/ACT Aero Soln inhalation aerosol Inhale 2 Puffs by mouth every 6 hours as needed for Shortness of Breath. 8.5 g 2   • aripiprazole (ABILIFY) 30 MG tablet Take 30 mg by mouth every morning.     • furosemide (LASIX) 20 MG Tab Take 1 Tab by mouth every day. (Patient not taking: Reported on 8/28/2020) 30 Tab 1   • atorvastatin (LIPITOR) 20 MG Tab Take 1 Tab by mouth every day. (Patient not taking: Reported on 8/28/2020) 30 Tab 1   • tiotropium (SPIRIVA) 18 MCG Cap Inhale 1 Cap by mouth every day. (Patient not taking: Reported on 8/28/2020) 30 Cap 6   • montelukast (SINGULAIR) 10 MG Tab Take 10 mg by mouth every bedtime.       No current facility-administered medications on file prior to visit.        Allergies: Amoxicillin    ROS:   Constitutional: Denies fevers, chills, night sweats, fatigue or weight loss  Eyes: Denies vision loss, pain, drainage, double vision  Ears, Nose, Throat: Denies earache, difficulty hearing, tinnitus, nasal congestion, hoarseness  Cardiovascular: Denies chest pain, tightness, palpitations, orthopnea or edema  Respiratory:As in HPI  Sleep: Denies daytime sleepiness, snoring, apneas, insomnia, morning headaches  GI: Denies heartburn, dysphagia, nausea, abdominal pain, diarrhea or constipation  : Denies frequent  "urination, hematuria, discharge or painful urination  Musculoskeletal: Denies back pain, painful joints, sore muscles  Neurological: Denies weakness or headaches  Skin: No rashes    /72 (BP Location: Left arm, Patient Position: Sitting, BP Cuff Size: Large adult)   Pulse 62   Resp 16   Ht 1.575 m (5' 2\")   Wt 124.7 kg (275 lb)   SpO2 94%     Physical Exam:  Appearance: Well-nourished, well-developed, in no acute distress  HEENT: Normocephalic, atraumatic, white sclera, PERRLA, masked  Neck: No adenopathy or masses  Respiratory: no intercostal retractions or accessory muscle use  Lungs auscultation:no wheezing  Cardiovascular: No LE edema  Abdomen: soft, obese  Gait: Normal  Digits: No clubbing, cyanosis  Motor: No focal deficits  Orientation: Oriented to time, person and place    Diagnosis:  1. KATHIA and COPD overlap syndrome (HCC)     2. Class 3 severe obesity due to excess calories with serious comorbidity and body mass index (BMI) of 50.0 to 59.9 in adult ([facility])     3. Tobacco use     4. Chronic obstructive pulmonary disease, unspecified COPD type ([facility])         Plan:  Lorene shows excellent compliance and response to AVAPS and is benefiting from treatment.    AHI is normal.  Continue ASV at above pressure settings with 3 L oxygen bleed in.  Has mild COPD, clinically stable.  Smoking abstinence encouraged.  Continue Spiriva inhaler.  She is considered medically stable for colonoscopy.  Return in about 6 months (around 2/28/2021).      "

## 2020-09-17 ENCOUNTER — APPOINTMENT (OUTPATIENT)
Dept: RADIOLOGY | Facility: MEDICAL CENTER | Age: 45
End: 2020-09-17
Attending: EMERGENCY MEDICINE
Payer: MEDICAID

## 2020-09-17 ENCOUNTER — HOSPITAL ENCOUNTER (EMERGENCY)
Facility: MEDICAL CENTER | Age: 45
End: 2020-09-17
Attending: EMERGENCY MEDICINE
Payer: MEDICAID

## 2020-09-17 VITALS
OXYGEN SATURATION: 95 % | BODY MASS INDEX: 51.61 KG/M2 | WEIGHT: 273.37 LBS | TEMPERATURE: 99 F | HEIGHT: 61 IN | DIASTOLIC BLOOD PRESSURE: 67 MMHG | HEART RATE: 61 BPM | SYSTOLIC BLOOD PRESSURE: 145 MMHG | RESPIRATION RATE: 22 BRPM

## 2020-09-17 DIAGNOSIS — N83.202 CYST OF LEFT OVARY: ICD-10-CM

## 2020-09-17 DIAGNOSIS — R10.9 RIGHT FLANK PAIN: ICD-10-CM

## 2020-09-17 LAB
ALBUMIN SERPL BCP-MCNC: 4.4 G/DL (ref 3.2–4.9)
ALBUMIN/GLOB SERPL: 1.2 G/DL
ALP SERPL-CCNC: 101 U/L (ref 30–99)
ALT SERPL-CCNC: 14 U/L (ref 2–50)
ANION GAP SERPL CALC-SCNC: 11 MMOL/L (ref 7–16)
APPEARANCE UR: CLEAR
AST SERPL-CCNC: 8 U/L (ref 12–45)
BASOPHILS # BLD AUTO: 0.2 % (ref 0–1.8)
BASOPHILS # BLD: 0.02 K/UL (ref 0–0.12)
BILIRUB SERPL-MCNC: 0.5 MG/DL (ref 0.1–1.5)
BILIRUB UR QL STRIP.AUTO: NEGATIVE
BUN SERPL-MCNC: 11 MG/DL (ref 8–22)
CALCIUM SERPL-MCNC: 9.6 MG/DL (ref 8.5–10.5)
CHLORIDE SERPL-SCNC: 104 MMOL/L (ref 96–112)
CO2 SERPL-SCNC: 27 MMOL/L (ref 20–33)
COLOR UR: YELLOW
CREAT SERPL-MCNC: 1 MG/DL (ref 0.5–1.4)
EOSINOPHIL # BLD AUTO: 0.18 K/UL (ref 0–0.51)
EOSINOPHIL NFR BLD: 1.8 % (ref 0–6.9)
ERYTHROCYTE [DISTWIDTH] IN BLOOD BY AUTOMATED COUNT: 50.5 FL (ref 35.9–50)
GLOBULIN SER CALC-MCNC: 3.6 G/DL (ref 1.9–3.5)
GLUCOSE SERPL-MCNC: 98 MG/DL (ref 65–99)
GLUCOSE UR STRIP.AUTO-MCNC: NEGATIVE MG/DL
HCT VFR BLD AUTO: 41.1 % (ref 37–47)
HGB BLD-MCNC: 12.6 G/DL (ref 12–16)
IMM GRANULOCYTES # BLD AUTO: 0.04 K/UL (ref 0–0.11)
IMM GRANULOCYTES NFR BLD AUTO: 0.4 % (ref 0–0.9)
KETONES UR STRIP.AUTO-MCNC: NEGATIVE MG/DL
LEUKOCYTE ESTERASE UR QL STRIP.AUTO: NEGATIVE
LIPASE SERPL-CCNC: 22 U/L (ref 11–82)
LITHIUM SERPL-MCNC: 0.8 MMOL/L (ref 0.6–1.2)
LYMPHOCYTES # BLD AUTO: 1.42 K/UL (ref 1–4.8)
LYMPHOCYTES NFR BLD: 14.2 % (ref 22–41)
MCH RBC QN AUTO: 28.7 PG (ref 27–33)
MCHC RBC AUTO-ENTMCNC: 30.7 G/DL (ref 33.6–35)
MCV RBC AUTO: 93.6 FL (ref 81.4–97.8)
MICRO URNS: NORMAL
MONOCYTES # BLD AUTO: 0.5 K/UL (ref 0–0.85)
MONOCYTES NFR BLD AUTO: 5 % (ref 0–13.4)
NEUTROPHILS # BLD AUTO: 7.86 K/UL (ref 2–7.15)
NEUTROPHILS NFR BLD: 78.4 % (ref 44–72)
NITRITE UR QL STRIP.AUTO: NEGATIVE
NRBC # BLD AUTO: 0 K/UL
NRBC BLD-RTO: 0 /100 WBC
PH UR STRIP.AUTO: 6 [PH] (ref 5–8)
PLATELET # BLD AUTO: 285 K/UL (ref 164–446)
PMV BLD AUTO: 11 FL (ref 9–12.9)
POTASSIUM SERPL-SCNC: 3.8 MMOL/L (ref 3.6–5.5)
PROT SERPL-MCNC: 8 G/DL (ref 6–8.2)
PROT UR QL STRIP: NEGATIVE MG/DL
RBC # BLD AUTO: 4.39 M/UL (ref 4.2–5.4)
RBC UR QL AUTO: NEGATIVE
SODIUM SERPL-SCNC: 142 MMOL/L (ref 135–145)
SP GR UR STRIP.AUTO: 1.01
UROBILINOGEN UR STRIP.AUTO-MCNC: 0.2 MG/DL
WBC # BLD AUTO: 10 K/UL (ref 4.8–10.8)

## 2020-09-17 PROCEDURE — 74177 CT ABD & PELVIS W/CONTRAST: CPT

## 2020-09-17 PROCEDURE — 85025 COMPLETE CBC W/AUTO DIFF WBC: CPT

## 2020-09-17 PROCEDURE — 700102 HCHG RX REV CODE 250 W/ 637 OVERRIDE(OP): Performed by: EMERGENCY MEDICINE

## 2020-09-17 PROCEDURE — 83690 ASSAY OF LIPASE: CPT

## 2020-09-17 PROCEDURE — 99284 EMERGENCY DEPT VISIT MOD MDM: CPT

## 2020-09-17 PROCEDURE — 80178 ASSAY OF LITHIUM: CPT

## 2020-09-17 PROCEDURE — 700117 HCHG RX CONTRAST REV CODE 255: Performed by: EMERGENCY MEDICINE

## 2020-09-17 PROCEDURE — 700111 HCHG RX REV CODE 636 W/ 250 OVERRIDE (IP): Performed by: EMERGENCY MEDICINE

## 2020-09-17 PROCEDURE — 80053 COMPREHEN METABOLIC PANEL: CPT

## 2020-09-17 PROCEDURE — 81003 URINALYSIS AUTO W/O SCOPE: CPT

## 2020-09-17 PROCEDURE — A9270 NON-COVERED ITEM OR SERVICE: HCPCS | Performed by: EMERGENCY MEDICINE

## 2020-09-17 PROCEDURE — 96374 THER/PROPH/DIAG INJ IV PUSH: CPT | Mod: XU

## 2020-09-17 RX ORDER — METHOCARBAMOL 750 MG/1
750 TABLET, FILM COATED ORAL 4 TIMES DAILY
Qty: 12 TAB | Refills: 0 | Status: SHIPPED | OUTPATIENT
Start: 2020-09-17 | End: 2020-09-20

## 2020-09-17 RX ORDER — DIAZEPAM 5 MG/1
5 TABLET ORAL ONCE
Status: COMPLETED | OUTPATIENT
Start: 2020-09-17 | End: 2020-09-17

## 2020-09-17 RX ORDER — LIDOCAINE 50 MG/G
1 PATCH TOPICAL EVERY 24 HOURS
Qty: 5 PATCH | Refills: 0 | Status: SHIPPED | OUTPATIENT
Start: 2020-09-17 | End: 2020-09-22

## 2020-09-17 RX ORDER — IBUPROFEN 600 MG/1
600 TABLET ORAL EVERY 8 HOURS PRN
Qty: 9 TAB | Refills: 0 | Status: SHIPPED | OUTPATIENT
Start: 2020-09-17 | End: 2020-09-20

## 2020-09-17 RX ORDER — KETOROLAC TROMETHAMINE 30 MG/ML
15 INJECTION, SOLUTION INTRAMUSCULAR; INTRAVENOUS ONCE
Status: COMPLETED | OUTPATIENT
Start: 2020-09-17 | End: 2020-09-17

## 2020-09-17 RX ADMIN — IOHEXOL 100 ML: 350 INJECTION, SOLUTION INTRAVENOUS at 15:00

## 2020-09-17 RX ADMIN — DIAZEPAM 5 MG: 5 TABLET ORAL at 13:47

## 2020-09-17 RX ADMIN — KETOROLAC TROMETHAMINE 15 MG: 30 INJECTION, SOLUTION INTRAMUSCULAR at 13:48

## 2020-09-17 ASSESSMENT — LIFESTYLE VARIABLES: DO YOU DRINK ALCOHOL: NO

## 2020-09-17 ASSESSMENT — FIBROSIS 4 INDEX: FIB4 SCORE: 0.48

## 2020-09-17 NOTE — ED NOTES
Patient wheelchair via staff to [facility] room 27. Placed patient into gown, on auto bp, on heart monitor, spo2, gave warm blanket, and call light. Ready to be seen

## 2020-09-17 NOTE — DISCHARGE INSTRUCTIONS
At this time we do not have a definitive explanation for your right flank pain, but suspect it is a muscular strain/sprain.    Use ice to help with the pain.    Follow-up with your primary care doctor within the next 1 to 2 days.  Please call for appointment.    He will also need to follow-up with your gynecologist as you were found to have a 3 cm left ovarian cyst on your CT scan today.  This is likely unrelated to your right flank pain, but will need to be followed up.  Your primary care physician can help arrange follow-up as well if you do not have a gynecologist.    Return to the ER for any worsening pain, changing pain, rash, fevers over 100.4, shaking chills, nausea, vomiting, diarrhea, constipation, pain with urination, blood in urine, shortness of breath, cough, or for any concerns.

## 2020-09-17 NOTE — ED NOTES
Pt to CT    

## 2020-09-17 NOTE — ED NOTES
ERP at bedside for reeval. Additional blood work drawn and sent to lab. Pt aware of POC. Resting on gurney with no other needs at this time. VSS.

## 2020-09-17 NOTE — ED PROVIDER NOTES
ED Provider Note    Scribed for Claribel Cortez M.D. by Florentino Rodgers. 9/17/2020  1:08 PM    Primary care provider: SHANKAR Gleason  Means of arrival: EMS  History obtained from: Patient  History limited by: None  CHIEF COMPLAINT  Chief Complaint   Patient presents with   • Flank Pain     RT flank x3 days. denies injury.        HPI  Lorene Haro is a 45 y.o. female who presents to the ED via EMS for evaluation of right sided flank pain onset three days ago. Patient states that the pain came on suddenly. She describes her pain as stabbing.  Pain has been constant and unrelenting since onset. Patient denies any recent trauma or injury.  Pain is nonradiating and stays right at her right waistline.  Patient had associated nausea yesterday but no nausea today.  No vomiting.  Patient states the pain gets a little worse when she takes a deep breath but she does not feel short of breath.  No cough.  No hemoptysis.  No rash.  No abdominal pain.  Patient states that her ankles are usually swollen at baseline.  No radiating pain down the legs.  Denies any fever, rash, cough, loss of appetite, chest pain, dysuria, hematuria, numbness, tingling, vomiting, diarrhea, constipation, or abdominal pain. Pain is exacerbated with movement, positional changes, and breathing. Pain is alleviated with rest. Patient has also tried alleviating her symptoms with Ibuprofen, only slightly improving her pain. She initially denied having any pain like this in the past, but then upon further thought, recalls having a similar episode in 2008.  The patient states that the pain gets significantly worse with movements.  She states that changing position in bed, getting out of bed, or twisting her torso cause increased pain.  She is lying still in bed, she does not have much pain.  Patient had a cholecystectomy and a hysterectomy.     REVIEW OF SYSTEMS  See HPI for further details. All other systems are negative.  Pertinent positives include  right sided flank pain, shortness of breath and nausea. Pertinent negatives include no fever, rash, cough, loss of appetite, chest pain, dysuria, hematuria, numbness, tingling vomiting, diarrhea, constipation, or abdominal pain.    PAST MEDICAL HISTORY  Past Medical History:   Diagnosis Date   • Asthma    • Bipolar 2 disorder (HCC)    • Chickenpox    • Other psychological stress    • Schizophrenic disorder (HCC)        FAMILY HISTORY  Family History   Problem Relation Age of Onset   • No Known Problems Mother    • Cancer Sister        SOCIAL HISTORY  Social History     Socioeconomic History   • Marital status: Single   Social Needs   • Financial resource strain: Not hard at all   • Food insecurity     Worry: Never true     Inability: Never true   • Transportation needs     Medical: No     Non-medical: No   Tobacco Use   • Smoking status: Former Smoker     Packs/day: 1.00     Years: 20.00     Pack years: 20.00     Types: Cigarettes     Quit date: 2018     Years since quittin.3   • Smokeless tobacco: Never Used   • Tobacco comment: since , trying to quit   Substance and Sexual Activity   • Alcohol use: No   • Drug use: No   Social History Narrative    From [place]       SURGICAL HISTORY  Past Surgical History:   Procedure Laterality Date   • CHOLECYSTECTOMY     • COLECTOMY     • HYSTERECTOMY LAPAROSCOPY     • KNEE ARTHROSCOPY         CURRENT MEDICATIONS  Home Medications     Reviewed by Louie Pappas R.N. (Registered Nurse) on 20 at 1120  Med List Status: Partial   Medication Last Dose Status   acetaminophen (TYLENOL) 325 MG Tab  Active   albuterol 108 (90 Base) MCG/ACT Aero Soln inhalation aerosol  Active   albuterol 108 (90 Base) MCG/ACT Aero Soln inhalation aerosol  Active   aripiprazole (ABILIFY) 30 MG tablet  Active   atorvastatin (LIPITOR) 20 MG Tab  Active   atorvastatin (LIPITOR) 20 MG Tab  Active   benztropine (COGENTIN) 1 MG Tab  Active   cyanocobalamin (VITAMIN B12) 1000 MCG Tab  Active  "  fluticasone (FLONASE) 50 MCG/ACT nasal spray  Active   furosemide (LASIX) 20 MG Tab  Active   furosemide (LASIX) 20 MG Tab  Active   haloperidol (HALDOL) 5 MG Tab  Active   hydrOXYzine HCl (ATARAX) 25 MG Tab  Active   ipratropium-albuterol (DUONEB) 0.5-2.5 (3) MG/3ML nebulizer solution  Active   lithium CR (LITHOBID) 300 MG Tab CR  Active   montelukast (SINGULAIR) 10 MG Tab  Active   montelukast (SINGULAIR) 10 MG Tab  Active   tiotropium (SPIRIVA HANDIHALER) 18 MCG Cap  Active   tiotropium (SPIRIVA) 18 MCG Cap  Active                ALLERGIES  Allergies   Allergen Reactions   • Amoxicillin Rash       PHYSICAL EXAM  VITAL SIGNS: /81   Pulse 69   Temp 37.2 °C (99 °F) (Temporal)   Resp 18   Ht 1.549 m (5' 1\")   Wt 124 kg (273 lb 5.9 oz)   LMP  (LMP Unknown)   SpO2 98%   BMI 51.65 kg/m²      Constitutional: Morbidly obese. Well developed, well nourished; No acute distress; Non-toxic appearance.   HENT: Normocephalic, atraumatic; Bilateral external ears normal; Oropharyngeal exam deferred to COVID-19 outbreak and lack of oropharyngeal complaints.  Eyes: PERRL, EOMI, Conjunctiva normal. No discharge.   Neck:  Supple, nontender midline; No stridor; No nuchal rigidity.   Lymphatic: No cervical lymphadenopathy noted.   Cardiovascular: Regular rate and rhythm without murmurs, rubs, or gallop.   Thorax & Lungs: No respiratory distress, breath sounds clear to auscultation bilaterally without wheezing, rales or rhonchi. Nontender chest wall. No crepitus or subcutaneous air  Abdomen: Morbidly obese. Nontender, soft, bowel sounds normal. No obvious masses; No pulsatile masses; no rebound or guarding.   Skin: Good color; Dry and flaky. Warm.   Back:  Tenderness to the right midflank near her waistline, No CVA tenderness. No rash or vesicles.   Extremities: Bilateral lower extremity pitting edema. Distal radial, dorsalis pedis, posterior tibial pulses are equal bilaterally; No edema; Nontender calves or saphenous, No " cyanosis, No clubbing.   Musculoskeletal: Good range of motion in all major joints. No tenderness to palpation or major deformities noted.   Neurologic: Alert & oriented x 4, clear speech. 5/5 strengths with testing dorsiflexors and plantar flexors.  Sensation is intact light touch.    LABS/RADIOLOGY/PROCEDURES  Results for orders placed or performed during the hospital encounter of 09/17/20   CBC WITH DIFFERENTIAL   Result Value Ref Range    WBC 10.0 4.8 - 10.8 K/uL    RBC 4.39 4.20 - 5.40 M/uL    Hemoglobin 12.6 12.0 - 16.0 g/dL    Hematocrit 41.1 37.0 - 47.0 %    MCV 93.6 81.4 - 97.8 fL    MCH 28.7 27.0 - 33.0 pg    MCHC 30.7 (L) 33.6 - 35.0 g/dL    RDW 50.5 (H) 35.9 - 50.0 fL    Platelet Count 285 164 - 446 K/uL    MPV 11.0 9.0 - 12.9 fL    Neutrophils-Polys 78.40 (H) 44.00 - 72.00 %    Lymphocytes 14.20 (L) 22.00 - 41.00 %    Monocytes 5.00 0.00 - 13.40 %    Eosinophils 1.80 0.00 - 6.90 %    Basophils 0.20 0.00 - 1.80 %    Immature Granulocytes 0.40 0.00 - 0.90 %    Nucleated RBC 0.00 /100 WBC    Neutrophils (Absolute) 7.86 (H) 2.00 - 7.15 K/uL    Lymphs (Absolute) 1.42 1.00 - 4.80 K/uL    Monos (Absolute) 0.50 0.00 - 0.85 K/uL    Eos (Absolute) 0.18 0.00 - 0.51 K/uL    Baso (Absolute) 0.02 0.00 - 0.12 K/uL    Immature Granulocytes (abs) 0.04 0.00 - 0.11 K/uL    NRBC (Absolute) 0.00 K/uL   COMP METABOLIC PANEL   Result Value Ref Range    Sodium 142 135 - 145 mmol/L    Potassium 3.8 3.6 - 5.5 mmol/L    Chloride 104 96 - 112 mmol/L    Co2 27 20 - 33 mmol/L    Anion Gap 11.0 7.0 - 16.0    Glucose 98 65 - 99 mg/dL    Bun 11 8 - 22 mg/dL    Creatinine 1.00 0.50 - 1.40 mg/dL    Calcium 9.6 8.5 - 10.5 mg/dL    AST(SGOT) 8 (L) 12 - 45 U/L    ALT(SGPT) 14 2 - 50 U/L    Alkaline Phosphatase 101 (H) 30 - 99 U/L    Total Bilirubin 0.5 0.1 - 1.5 mg/dL    Albumin 4.4 3.2 - 4.9 g/dL    Total Protein 8.0 6.0 - 8.2 g/dL    Globulin 3.6 (H) 1.9 - 3.5 g/dL    A-G Ratio 1.2 g/dL   LIPASE   Result Value Ref Range    Lipase 22 11 -  82 U/L   URINALYSIS,CULTURE IF INDICATED    Specimen: Blood   Result Value Ref Range    Color Yellow     Character Clear     Specific Gravity 1.006 <1.035    Ph 6.0 5.0 - 8.0    Glucose Negative Negative mg/dL    Ketones Negative Negative mg/dL    Protein Negative Negative mg/dL    Bilirubin Negative Negative    Urobilinogen, Urine 0.2 Negative    Nitrite Negative Negative    Leukocyte Esterase Negative Negative    Occult Blood Negative Negative    Micro Urine Req see below    ESTIMATED GFR   Result Value Ref Range    GFR If African American >60 >60 mL/min/1.73 m 2    GFR If Non African American 59 (A) >60 mL/min/1.73 m 2   LITHIUM   Result Value Ref Range    Lithium 0.8 0.6 - 1.2 mmol/L     CT-ABDOMEN-PELVIS WITH   Final Result      1.  No evidence of renal or ureteral stone or hydronephrosis.      2.  No evidence of bowel obstruction or focal inflammatory change.      3.  3 cm left ovarian cyst.      4.  Prior cholecystectomy and hysterectomy.      5.  Fatty change of the liver.          COURSE & MEDICAL DECISION MAKING  Pertinent Labs & Imaging studies reviewed. (See chart for details)    Reviewed patient's old medical records which showed the patient has been to the ED eight times in the last three months.     1:08 PM - Patient seen and examined at bedside. I discussed with the patient my plan of care, which includes giving the patient something for the pain as well as obtaining lab work and imaging for further evaluation. Patient agrees to the plan of care. The patient will be medicated with Toradol 15 mg and Valium 5 mg. Ordered imaging for further evaluation.     2:31 PM - Patient was reevaluated at bedside. Treated with Omnipaque 350 mg/ml.     4:13 PM - Patient was reevaluated at bedside. Patient states that she feels improved with the medication. With the patient lying in bed, she notes that her pain is currently at a 2/10. Patient reported that she experienced this pain before in 2018, and was seen in the  ED for it. She states that she was diagnosed with muscle pain, treated it with ice and pain medication, and the pain soon dissipated. Discussed lab and radiology results with the patient and informed them that there were no significant findings.    4:38  PM - Patient's lab work was reviewed. I asked the patient if she is taking Lithium, and patient states that she is. Ordered Lithium for further evaluation.      Patient was reevaluated at bedside. Discussed the lab results with her.  She is aware that she has a 3 cm left ovarian cyst which needs follow-up.  I then informed the patient of my plan for discharge, which includes strict return precautions for any new or worsening symptoms such as fever or vomiting. I also recommended that the patient ice her pained area, and to take Ibuprofen as needed to help manage her pain. Patient understands and verbalizes agreement to plan of care. Patient is comfortable going home at this time.       Patient presents to the ER complaining of constant right flank pain which began 3 days ago.  She localizes the flank pain to her right posterior waistline.  It does not radiate into the abdomen.  It does not radiate down the leg.  Gets worse with movements such as changing position in bed, getting up out of bed, and twisting of the torso.  No fevers or chills.  She has been eating normally.  No nausea or vomiting.  Eating does not make the pain worse.  Her gallbladder is already been removed.  She says the pain gets a little bit worse when she takes a deep breath but she does not feel short of breath.  No cough.  No hemoptysis.  She does not feel like the pain is in her lungs.  Lung bases on CT scan looks fine.  At this time no concern for pneumonia.  Low suspicion for PE.  She has an obese but soft and nontender abdomen.  No tenderness in the right lower quadrant.  Urine is clear.  No blood in urine.  No evidence of UTI.  CT scan is negative for ureteral stone.  There is no rash seen  on the skin exam.  She is tender along the waistline on the right flank.  She has no radiating pain down the legs.  No complaints of numbness, tingling weakness of extremities.  Her neurologic exam is normal.  Strengths are good.  No concern for cord compression or cauda equina syndrome.  Lab work is completely normal and unremarkable.  CT scan of the abdomen pelvis is again negative.  At this time I do not have a good explanation for patient's pain other than it gets markedly worse with certain movements.  I think she has a musculoskeletal cause of her pain.  There are no skin changes to suggest cellulitis or necrotizing fasciitis.  I gave her some Toradol and Robaxin and her pain went from a 10 out of 10 down to a 2 out of 10.  She is much more comfortable.  Vital signs are normal and stable.  At this time I think she is a musculoskeletal cause of her pain.  She is morbidly obese.  She probably strained her back.  She will go home with lidocaine patch.  She is to ice her back.  She also go home with some Motrin and some Robaxin.  She has been given very strict return precautions and discharge instructions.  She understands if she develops any worsening pain, changing pain, or any development of new/associated symptoms, then she must return to the ER immediately.  Otherwise patient is to follow-up with her primary care physician.  She understands treatment plan and follow-up.      PPE Note: I personally donned full PPE for all patient encounters during this visit, including an N95 respirator mask, gloves, and goggles.     Scribe remained outside the patient's room and did not have any contact with the patient for the duration of patient encounter.     The patient will return for new or worsening symptoms and is stable at the time of discharge.    The patient is referred to a primary physician for blood pressure management, diabetic screening, and for all other preventative health concerns.    DISPOSITION:  Patient  will be discharged home in stable condition.    FOLLOW UP:  SHANKAR Gleason  [address] [place] [id number] [phone]    Schedule an appointment as soon as possible for a visit in 2 days  If symptoms worsen return to ER      FINAL IMPRESSION  1. Right flank pain Acute   2. Cyst of left ovary Acute        This dictation has been created using voice recognition software. The accuracy of the dictation is limited by the abilities of the software. I expect there may be some errors of grammar and possibly content. I made every attempt to manually correct the errors within my dictation. However, errors related to voice recognition software may still exist and should be interpreted within the appropriate context.     I, Florentino Rodgers (Scribe), am scribing for, and in the presence of, Claribel Cortez M.D..    Electronically signed by: Florentino Rodgers (Scribe), 9/17/2020    Claribel HARRIS M.D. personally performed the services described in this documentation, as scribed by Florentino Rodgers in my presence, and it is both accurate and complete.    C    The note accurately reflects work and decisions made by me.  Claribel Cortez M.D.  9/17/2020  8:45 PM

## 2020-09-17 NOTE — ED NOTES
Patient able to walk to and from restroom with steady gate at this time with x1 assist.  Chart up for recheck

## 2020-09-17 NOTE — ED TRIAGE NOTES
"Chief Complaint   Patient presents with   • Flank Pain     RT flank x3 days. denies injury.      BIB EMS to triage for above. Reports is feels swollen on that side. Pt denies issues with urination, denies fevers.     Denies covid s/sx, denies travel or contact with it.     /81   Pulse 69   Temp 37.2 °C (99 °F) (Temporal)   Resp 18   Ht 1.549 m (5' 1\")   Wt 124 kg (273 lb 5.9 oz)   LMP  (LMP Unknown)   SpO2 98%   BMI 51.65 kg/m²     "

## 2020-10-20 ENCOUNTER — OFFICE VISIT (OUTPATIENT)
Dept: URGENT CARE | Facility: CLINIC | Age: 45
End: 2020-10-20
Payer: MEDICAID

## 2020-10-20 VITALS
DIASTOLIC BLOOD PRESSURE: 82 MMHG | OXYGEN SATURATION: 93 % | BODY MASS INDEX: 53.05 KG/M2 | HEIGHT: 61 IN | SYSTOLIC BLOOD PRESSURE: 128 MMHG | TEMPERATURE: 97.5 F | HEART RATE: 72 BPM | RESPIRATION RATE: 16 BRPM | WEIGHT: 281 LBS

## 2020-10-20 DIAGNOSIS — J02.9 SORE THROAT: ICD-10-CM

## 2020-10-20 DIAGNOSIS — J35.8 TONSILLOLITH: ICD-10-CM

## 2020-10-20 LAB
INT CON NEG: NEGATIVE
INT CON POS: POSITIVE
S PYO AG THROAT QL: NEGATIVE

## 2020-10-20 PROCEDURE — 87880 STREP A ASSAY W/OPTIC: CPT | Performed by: PHYSICIAN ASSISTANT

## 2020-10-20 PROCEDURE — 99214 OFFICE O/P EST MOD 30 MIN: CPT | Performed by: PHYSICIAN ASSISTANT

## 2020-10-20 ASSESSMENT — ENCOUNTER SYMPTOMS
SORE THROAT: 1
ABDOMINAL PAIN: 0
HEADACHES: 0
COUGH: 0
CHILLS: 0
VOMITING: 0
SHORTNESS OF BREATH: 0
WHEEZING: 0
FEVER: 0
SPUTUM PRODUCTION: 0
MUSCULOSKELETAL NEGATIVE: 1
NAUSEA: 0
DIARRHEA: 0

## 2020-10-20 ASSESSMENT — FIBROSIS 4 INDEX: FIB4 SCORE: 0.35

## 2020-10-20 NOTE — PROGRESS NOTES
Subjective:   Lorene Haro is a 45 y.o. female who presents for Oral Swelling (x 2 days.  Pt. states it is scratchy and not sore.  Denies fever or chills. )      HPI  Patient is a 45-year-old female who presents with 2 days of a mild sore/scratchy throat.  She reports no worsening or no improvement.  Her sore throat is not worse on either side.  She states it is not exacerbated with any swallowing.  She has not tried anything for her symptoms yet.  She denies any trouble swallowing foods or liquids, globus sensation to the throat, difficulty breathing, wheezing, stridor, chest pain, shortness of breath.  Denies any fevers, chills, ear pain, drooling.  She has no other complaints or concerns.      Review of Systems   Constitutional: Negative for chills, fever and malaise/fatigue.   HENT: Positive for sore throat. Negative for congestion and ear pain.    Respiratory: Negative for cough, sputum production, shortness of breath and wheezing.    Cardiovascular: Negative for chest pain.   Gastrointestinal: Negative for abdominal pain, diarrhea, nausea and vomiting.   Musculoskeletal: Negative.    Skin: Negative.    Neurological: Negative for headaches.       Medications:    • acetaminophen Tabs  • albuterol Aers  • aripiprazole  • atorvastatin Tabs  • benztropine Tabs  • cyanocobalamin Tabs  • fluticasone  • furosemide Tabs  • haloperidol Tabs  • hydrOXYzine HCl Tabs  • ipratropium-albuterol  • lithium CR Tbcr  • montelukast Tabs  • Spiriva HandiHaler Caps  • tiotropium Caps    Allergies: Amoxicillin and Penicillin g    Problem List: Lorene Haro has COPD (chronic obstructive pulmonary disease) ([facility]); Hx of  Bipolar affective disorder, and schizophrenia; Cardiomegaly; Class 3 severe obesity due to excess calories with body mass index (BMI) of 50.0 to 59.9 in adult ([facility]); KATHIA and COPD overlap syndrome ([facility]); Iron deficiency anemia; Acute bronchitis; Acute on chronic respiratory failure with hypoxia ([facility]);  "Debility; Morbid obesity (HCC); and Macrocytic anemia on their problem list.    Surgical History:  Past Surgical History:   Procedure Laterality Date   • CHOLECYSTECTOMY     • COLECTOMY     • HYSTERECTOMY LAPAROSCOPY     • KNEE ARTHROSCOPY         Past Social Hx: Lorene Haro  reports that she quit smoking about 2 years ago. Her smoking use included cigarettes. She has a 20.00 pack-year smoking history. She has never used smokeless tobacco. She reports that she does not drink alcohol or use drugs.     Past Family Hx:  Lorene Haro family history includes Cancer in her sister; No Known Problems in her mother.     Problem list, medications, and allergies reviewed by myself today in Epic.     Objective:     /82   Pulse 72   Temp 36.4 °C (97.5 °F) (Temporal)   Resp 16   Ht 1.549 m (5' 1\")   Wt (!) 127.5 kg (281 lb)   LMP  (LMP Unknown)   SpO2 93%   BMI 53.09 kg/m²     Physical Exam  Vitals signs reviewed.   Constitutional:       General: She is not in acute distress.     Appearance: Normal appearance. She is not ill-appearing or toxic-appearing.   HENT:      Mouth/Throat:      Lips: Pink.      Mouth: Mucous membranes are moist. No oral lesions.      Palate: No lesions.      Pharynx: Oropharynx is clear. Uvula midline. No pharyngeal swelling, oropharyngeal exudate, posterior oropharyngeal erythema or uvula swelling.      Tonsils: No tonsillar exudate.        Comments: Small, round, light lesion to right tonsil consistent with possible tonsillar stone.  This does not appear to be a tonsillar abscess.  Nontender to palpation using tongue depressor.  Eyes:      Conjunctiva/sclera: Conjunctivae normal.      Pupils: Pupils are equal, round, and reactive to light.   Neck:      Musculoskeletal: Neck supple.   Cardiovascular:      Rate and Rhythm: Normal rate and regular rhythm.      Heart sounds: Normal heart sounds.   Pulmonary:      Effort: Pulmonary effort is normal. No respiratory distress.      " Breath sounds: Normal breath sounds. No wheezing, rhonchi or rales.   Lymphadenopathy:      Cervical: No cervical adenopathy.   Skin:     General: Skin is warm and dry.   Neurological:      General: No focal deficit present.      Mental Status: She is alert and oriented to person, place, and time.   Psychiatric:         Mood and Affect: Mood normal.         Behavior: Behavior normal.         Assessment/Plan:     Diagnosis and associated orders:     1. Sore throat  POCT Rapid Strep A   2. Tonsillolith        Comments/MDM:     • POC Strep A: Negative   • Discussed with patient possible viral etiology versus irritation from tonsillolith.   • However, there is no significant amount of erythema, edema, or visualized mass.  Uvula is midline.  No evidence of abscess.   • Supportive and symptomatic treatment as of right now with warm salt water gargles, soft foods, cool liquids, ibuprofen or Tylenol as needed for pain.   • Recommend to return if symptoms are not improving in the next 5 to 7 days or any worsening.   • Recommend presenting to the emergency room with any difficulty breathing, wheezing, globus sensation or worsening swelling to her throat, fevers, chills or any other concerns.     She has a PCP appointment on 11/05/2020.        Red flags discussed and indications to immediately call 911 or present to the Emergency Department.   Supportive care, differential diagnoses, and indications for immediate follow-up discussed with patient.    Pathogenesis of diagnosis discussed including typical length and natural progression. Patient expresses understanding and agrees to plan.    Advised the patient to follow-up with the primary care physician for recheck, reevaluation, and consideration of further management.    Please note that this dictation was created using voice recognition software. I have made a reasonable attempt to correct obvious errors, but I expect that there are errors of grammar and possibly content that  I did not discover before finalizing the note.    This note was electronically signed by Pancho Vazquez PA-C

## 2020-10-20 NOTE — LETTER
October 20, 2020         Patient: Lorene Haro   YOB: 1975   Date of Visit: 10/20/2020           To Whom it May Concern:    Lorene Haro was seen in my clinic on 10/20/2020.     If you have any questions or concerns, please don't hesitate to call.        Sincerely,           Pancho Vazquez P.A.-C.  Electronically Signed

## 2020-10-28 NOTE — NUR
PT REC'VD DISCHARGE INSTRUCTIONS AND EDUCATION. PT HAD NO FURTHER QUESTIONS. PT 
AMBULATED TO DISCHARGE AREA, STEADY GAIT.

## 2020-10-28 NOTE — NUR
PT BIB AMBULANCE. PT C/O SOB SINCE 0630. PER EMS PT WAS 93% RA. PT STATES SHE 
HAS A HX OF ASTHAM AND COPD. PT DENIES FEVER OR CHEST PAIN AT THIS TIME. PT 
PLACED ON O2 AS SHE STATES SHE IS ON 3 LPM AT HOME. ALSO PER EMS PT STATES SHE 
HAS BEEN COUGHING UP YELLOW/BRWON SPUTUM. LAST EPISODE WAS THIS AM. PT STATES 
SHE HAD A POSITIVE STREP TEST LAST WEEK. PT PLACED IN A GOWN AND RESTING ON ED 
GURNEY.

## 2020-10-28 NOTE — NUR
PT COMPLETED AMBULATION WITH SPO2 MONITORING. PT SPO2 RANGED FROM 93-96%. PT 
STATES SHE HAS DIFFICULTY MOVING DUE TO PAIN AND SOB. PT IS CONCERNED ABOUT 
GOING HOME DUE TO LIVING IN AN UPSTAIRS APT AND HAVING DIFFICULTY WITH GOING UP 
AND DOWN THEM.

## 2020-11-03 ENCOUNTER — APPOINTMENT (OUTPATIENT)
Dept: RADIOLOGY | Facility: MEDICAL CENTER | Age: 45
End: 2020-11-03
Attending: EMERGENCY MEDICINE
Payer: MEDICAID

## 2020-11-03 ENCOUNTER — HOSPITAL ENCOUNTER (EMERGENCY)
Facility: MEDICAL CENTER | Age: 45
End: 2020-11-04
Attending: EMERGENCY MEDICINE
Payer: MEDICAID

## 2020-11-03 DIAGNOSIS — R06.00 DYSPNEA, UNSPECIFIED TYPE: ICD-10-CM

## 2020-11-03 LAB
ALBUMIN SERPL BCP-MCNC: 4.2 G/DL (ref 3.2–4.9)
ALBUMIN/GLOB SERPL: 1.4 G/DL
ALP SERPL-CCNC: 87 U/L (ref 30–99)
ALT SERPL-CCNC: 24 U/L (ref 2–50)
ANION GAP SERPL CALC-SCNC: 13 MMOL/L (ref 7–16)
AST SERPL-CCNC: 19 U/L (ref 12–45)
BASOPHILS # BLD AUTO: 0.1 % (ref 0–1.8)
BASOPHILS # BLD: 0.02 K/UL (ref 0–0.12)
BILIRUB SERPL-MCNC: 0.3 MG/DL (ref 0.1–1.5)
BUN SERPL-MCNC: 14 MG/DL (ref 8–22)
CALCIUM SERPL-MCNC: 9.8 MG/DL (ref 8.5–10.5)
CHLORIDE SERPL-SCNC: 103 MMOL/L (ref 96–112)
CO2 SERPL-SCNC: 26 MMOL/L (ref 20–33)
COVID ORDER STATUS COVID19: NORMAL
CREAT SERPL-MCNC: 0.94 MG/DL (ref 0.5–1.4)
EKG IMPRESSION: NORMAL
EOSINOPHIL # BLD AUTO: 0 K/UL (ref 0–0.51)
EOSINOPHIL NFR BLD: 0 % (ref 0–6.9)
ERYTHROCYTE [DISTWIDTH] IN BLOOD BY AUTOMATED COUNT: 53.4 FL (ref 35.9–50)
GLOBULIN SER CALC-MCNC: 3 G/DL (ref 1.9–3.5)
GLUCOSE SERPL-MCNC: 140 MG/DL (ref 65–99)
HCT VFR BLD AUTO: 40.4 % (ref 37–47)
HGB BLD-MCNC: 12.5 G/DL (ref 12–16)
IMM GRANULOCYTES # BLD AUTO: 0.21 K/UL (ref 0–0.11)
IMM GRANULOCYTES NFR BLD AUTO: 1.5 % (ref 0–0.9)
LYMPHOCYTES # BLD AUTO: 1.16 K/UL (ref 1–4.8)
LYMPHOCYTES NFR BLD: 8.1 % (ref 22–41)
MCH RBC QN AUTO: 28.9 PG (ref 27–33)
MCHC RBC AUTO-ENTMCNC: 30.9 G/DL (ref 33.6–35)
MCV RBC AUTO: 93.5 FL (ref 81.4–97.8)
MONOCYTES # BLD AUTO: 0.79 K/UL (ref 0–0.85)
MONOCYTES NFR BLD AUTO: 5.5 % (ref 0–13.4)
NEUTROPHILS # BLD AUTO: 12.11 K/UL (ref 2–7.15)
NEUTROPHILS NFR BLD: 84.8 % (ref 44–72)
NRBC # BLD AUTO: 0 K/UL
NRBC BLD-RTO: 0 /100 WBC
NT-PROBNP SERPL IA-MCNC: 546 PG/ML (ref 0–125)
PLATELET # BLD AUTO: 302 K/UL (ref 164–446)
PMV BLD AUTO: 11.2 FL (ref 9–12.9)
POTASSIUM SERPL-SCNC: 4 MMOL/L (ref 3.6–5.5)
PROT SERPL-MCNC: 7.2 G/DL (ref 6–8.2)
RBC # BLD AUTO: 4.32 M/UL (ref 4.2–5.4)
SODIUM SERPL-SCNC: 142 MMOL/L (ref 135–145)
WBC # BLD AUTO: 14.3 K/UL (ref 4.8–10.8)

## 2020-11-03 PROCEDURE — 85025 COMPLETE CBC W/AUTO DIFF WBC: CPT

## 2020-11-03 PROCEDURE — 99284 EMERGENCY DEPT VISIT MOD MDM: CPT

## 2020-11-03 PROCEDURE — 83880 ASSAY OF NATRIURETIC PEPTIDE: CPT

## 2020-11-03 PROCEDURE — 36415 COLL VENOUS BLD VENIPUNCTURE: CPT

## 2020-11-03 PROCEDURE — 80053 COMPREHEN METABOLIC PANEL: CPT

## 2020-11-03 PROCEDURE — 71045 X-RAY EXAM CHEST 1 VIEW: CPT

## 2020-11-03 PROCEDURE — U0003 INFECTIOUS AGENT DETECTION BY NUCLEIC ACID (DNA OR RNA); SEVERE ACUTE RESPIRATORY SYNDROME CORONAVIRUS 2 (SARS-COV-2) (CORONAVIRUS DISEASE [COVID-19]), AMPLIFIED PROBE TECHNIQUE, MAKING USE OF HIGH THROUGHPUT TECHNOLOGIES AS DESCRIBED BY CMS-2020-01-R: HCPCS

## 2020-11-03 PROCEDURE — C9803 HOPD COVID-19 SPEC COLLECT: HCPCS | Performed by: EMERGENCY MEDICINE

## 2020-11-03 PROCEDURE — 93005 ELECTROCARDIOGRAM TRACING: CPT | Performed by: EMERGENCY MEDICINE

## 2020-11-03 ASSESSMENT — FIBROSIS 4 INDEX: FIB4 SCORE: 0.35

## 2020-11-04 VITALS
HEIGHT: 61 IN | DIASTOLIC BLOOD PRESSURE: 65 MMHG | WEIGHT: 272 LBS | OXYGEN SATURATION: 97 % | HEART RATE: 80 BPM | RESPIRATION RATE: 20 BRPM | TEMPERATURE: 98 F | BODY MASS INDEX: 51.35 KG/M2 | SYSTOLIC BLOOD PRESSURE: 143 MMHG

## 2020-11-04 LAB
SARS-COV-2 RNA RESP QL NAA+PROBE: NOTDETECTED
SPECIMEN SOURCE: NORMAL

## 2020-11-04 NOTE — ED TRIAGE NOTES
"Chief Complaint   Patient presents with   • Shortness of Breath     BIB EMS FOR INCREASED SOB. TESTED NEG FOR COVID 1 WK AGO.      GIVEN 1 ALBUTEROL AND 2 DUO NEB TREATMENTS PTA.    /75   Pulse 93   Temp 36.7 °C (98 °F) (Temporal)   Resp (!) 26   Ht 1.549 m (5' 1\")   Wt 123.4 kg (272 lb)   LMP  (LMP Unknown)   SpO2 95%   BMI 51.39 kg/m²     "

## 2020-11-04 NOTE — ED PROVIDER NOTES
ED Provider Note    This patient was cared for during the COVID-19 pandemic. History and physical exam may be limited/truncated by the inherent challenges of PPE and the need to decrease staff exposure to novel coronavirus. Some aspects of disease management may be different to protect staff and help slow the spread of disease. I verified that, if possible, the patient was wearing a mask and I was wearing appropriate PPE every time I encountered the patient.       CHIEF COMPLAINT  Chief Complaint   Patient presents with   • Shortness of Breath     BIB EMS FOR INCREASED SOB. TESTED NEG FOR COVID 1 WK AGO.        HPI  Lorene Haro is a 45 y.o. female who presents with shortness of breath and jitteriness.  Patient says that she got acutely short of breath around 5 or 6 tonight.  She gave herself a breathing treatment and she called EMS.  She was feeling very jittery and unwell and wanted to come get checked out.  She denies any chest pain.  She has some shortness of breath and has some wheezing and increased pain usual.  She has a history of COPD.  She has a history of hypertension but has not been on medications for many years.        REVIEW OF SYSTEMS  positive for shortness of breath, negative for chest pain. All other systems are negative.     PAST MEDICAL HISTORY   has a past medical history of Asthma, Bipolar 2 disorder (HCC), Chickenpox, Other psychological stress, and Schizophrenic disorder ([facility]).    SOCIAL HISTORY  Social History     Tobacco Use   • Smoking status: Former Smoker     Packs/day: 1.00     Years: 20.00     Pack years: 20.00     Types: Cigarettes     Quit date: 2018     Years since quittin.4   • Smokeless tobacco: Never Used   • Tobacco comment: since , trying to quit   Substance and Sexual Activity   • Alcohol use: No   • Drug use: No   • Sexual activity: Not on file       SURGICAL HISTORY   has a past surgical history that includes hysterectomy laparoscopy; colectomy; knee  "arthroscopy; and cholecystectomy.    CURRENT MEDICATIONS  Home Medications     Reviewed by Rosario Mayberry R.N. (Registered Nurse) on 11/03/20 at 2031  Med List Status: <None>   Medication Last Dose Status   acetaminophen (TYLENOL) 325 MG Tab  Active   albuterol 108 (90 Base) MCG/ACT Aero Soln inhalation aerosol  Active   albuterol 108 (90 Base) MCG/ACT Aero Soln inhalation aerosol  Active   aripiprazole (ABILIFY) 30 MG tablet  Active   atorvastatin (LIPITOR) 20 MG Tab  Active   atorvastatin (LIPITOR) 20 MG Tab  Active   benztropine (COGENTIN) 1 MG Tab  Active   cyanocobalamin (VITAMIN B12) 1000 MCG Tab  Active   fluticasone (FLONASE) 50 MCG/ACT nasal spray  Active   furosemide (LASIX) 20 MG Tab  Active   furosemide (LASIX) 20 MG Tab  Active   haloperidol (HALDOL) 5 MG Tab  Active   hydrOXYzine HCl (ATARAX) 25 MG Tab  Active   ipratropium-albuterol (DUONEB) 0.5-2.5 (3) MG/3ML nebulizer solution  Active   lithium CR (LITHOBID) 300 MG Tab CR  Active   montelukast (SINGULAIR) 10 MG Tab  Active   montelukast (SINGULAIR) 10 MG Tab  Active   tiotropium (SPIRIVA HANDIHALER) 18 MCG Cap  Active   tiotropium (SPIRIVA) 18 MCG Cap  Active                ALLERGIES  Allergies   Allergen Reactions   • Amoxicillin Rash   • Penicillin G Itching       PHYSICAL EXAM  VITAL SIGNS: /65   Pulse 80   Temp 36.7 °C (98 °F) (Temporal)   Resp 20   Ht 1.549 m (5' 1\")   Wt 123.4 kg (272 lb)   LMP  (LMP Unknown)   SpO2 97%   BMI 51.39 kg/m² .  Constitutional: Alert in no apparent distress.  HENT: No signs of trauma, Bilateral external ears normal, Nose normal.   Eyes: Pupils are equal and reactive, Conjunctiva normal, Non-icteric.   Neck: Normal range of motion, No tenderness, Supple, No stridor.   Cardiovascular: Regular rate and rhythm, no murmurs.   Thorax & Lungs: Slight end expiratory wheezing  Abdomen: Bowel sounds normal, Soft, No tenderness, No masses, No peritoneal signs.  Skin: Warm, Dry, No erythema, No rash. "   Musculoskeletal:  no major deformities noted.   Neurologic: Alert,  No focal deficits noted.   Psychiatric: Affect normal, Judgment normal, Mood normal.       DIAGNOSTIC STUDIES / PROCEDURES      EKG  Results for orders placed or performed during the hospital encounter of 20   EKG   Result Value Ref Range    Report       [facility] Emergency Dept.    Test Date:  2020  Pt Name:    ADELINA MILLAN                Department: ER  MRN:        [id number]                      Room:       [facility]  Gender:     Female                       Technician: [id number]  :        [id number]                   Requested By:ER TRIAGE PROTOCOL  Order #:    [id number]                    Reading MD: SHARMILA ROBB MD    Measurements  Intervals                                Axis  Rate:       79                           P:          47  OR:         140                          QRS:        55  QRSD:       94                           T:          18  QT:         384  QTc:        441    Interpretive Statements  SINUS RHYTHM  Compared to ECG 2020 18:25:14  Electronically Signed On 2020 22:18:45 PDT by SHARMILA ROBB MD           LABS  Labs Reviewed   CBC WITH DIFFERENTIAL - Abnormal; Notable for the following components:       Result Value    WBC 14.3 (*)     MCHC 30.9 (*)     RDW 53.4 (*)     Neutrophils-Polys 84.80 (*)     Lymphocytes 8.10 (*)     Immature Granulocytes 1.50 (*)     Neutrophils (Absolute) 12.11 (*)     Immature Granulocytes (abs) 0.21 (*)     All other components within normal limits   COMP METABOLIC PANEL - Abnormal; Notable for the following components:    Glucose 140 (*)     All other components within normal limits   PROBRAIN NATRIURETIC PEPTIDE, NT - Abnormal; Notable for the following components:    NT-proBNP 546 (*)     All other components within normal limits   ESTIMATED GFR   COVID/SARS COV-2    Narrative:     Is patient being admitted?->Yes  Does this patient meet criteria for  Rush/Cepheid per [facility]  Inpatient Workflow? (See workflow link below)->Yes  Expected turn around time?->Rush (Cepheid 2-4 hours)  Is this the patients First SARS CoV-2 test?->No  Is this patient employed in healthcare?->No  Is the patient symptomatic as defined by the CDC?->Yes  Date of symptom onset?->11/3/20  Is the patient hospitalized?->Yes  Is the patient in the ICU?->No  Is the patient a resident in a congregate care setting?->No  Is the patient pregnant?->No   SARS-COV-2, PCR (IN-HOUSE)    Narrative:     Is patient being admitted?->Yes  Does this patient meet criteria for Rush/Cepheid per [facility]  Inpatient Workflow? (See workflow link below)->Yes  Expected turn around time?->Rush (Cepheid 2-4 hours)  Is this the patients First SARS CoV-2 test?->No  Is this patient employed in healthcare?->No  Is the patient symptomatic as defined by the CDC?->Yes  Date of symptom onset?->11/3/20  Is the patient hospitalized?->Yes  Is the patient in the ICU?->No  Is the patient a resident in a congregate care setting?->No  Is the patient pregnant?->No       RADIOLOGY  DX-CHEST-PORTABLE (1 VIEW)   Final Result         1.  Pulmonary edema and/or infiltrates.          Medical records reviewed for continuity of care.  She had an echo in November of last year that was unremarkable.  She was seen by pulmonary in the end of August.  She was seen in the emergency department multiple times for panic attacks.    Differential Diagnosis includes but is not limited to: COVID-19 pneumonia, febrile.  Heart failure, asthma exacerbation    COURSE & MEDICAL DECISION MAKING  Pertinent Labs & Imaging studies reviewed. (See chart for details)    This is a 45-year-old female that presents with shortness of breath.  On exam she has very minimal end expiratory wheezing.  She was given a breathing treatment.  Labs are obtained.  She does have a slightly elevated white count at 14.  Her neutrophils are minimally elevated her x-ray shows some possible  pulmonary edema or infiltrates.  However she is not febrile she is not tachycardic she is not hypoxic she is 97% on room air.  She is Covid negative.  Clinically I do not feel that this is indicative of pneumonia.  Her BNP is only minimally elevated at 546 she had a normal echo 1 year ago therefore I do not think she is fluid overload.  Her BMI is over 50 I do suspect that there is some technical difficulties with the x-ray.  At this point she is on no oxygen with normal saturations she is breathing well repeat evaluation shows no evidence of any wheezing.  I do not think she has evidence of pneumonia or fluid overload at this time she is on room air I do think she can be discharged with outpatient follow-up.  She is agreeable to this plan.     The patient will return for new or worsening symptoms and is stable at the time of discharge. Patient was given return precautions. Patient and/or family member verbalizes understanding and will comply.    DISPOSITION:  Patient will be discharged home in stable condition.    FOLLOW UP:  SHANKAR Gleason  [address] [place] [address] [id number] [phone]    Schedule an appointment as soon as possible for a visit in 1 week      [facility], Emergency Dept  [address] [facility] [id number] [phone]    Return to the emergency department for worsening shortness of breath, chest pains or other concerns      OUTPATIENT MEDICATIONS:  New Prescriptions    No medications on file         FINAL IMPRESSION  1. Dyspnea, unspecified type         2.   3.    This dictation has been creating using voice recognition software. The accuracy of the dictation is limited the abilities of the software.  I expect there may be some errors of grammar and possibly content. I made every attempt to manually correct the errors within my dictation. However errors related to this voice recognition software may still exist and should be interpreted within the appropriate  context.      The note accurately reflects work and decisions made by me.  Lazara Lee M.D.  11/4/2020  1:01 AM

## 2020-11-08 ENCOUNTER — APPOINTMENT (OUTPATIENT)
Dept: RADIOLOGY | Facility: MEDICAL CENTER | Age: 45
DRG: 189 | End: 2020-11-08
Attending: EMERGENCY MEDICINE
Payer: MEDICAID

## 2020-11-08 ENCOUNTER — HOSPITAL ENCOUNTER (INPATIENT)
Facility: MEDICAL CENTER | Age: 45
LOS: 2 days | DRG: 189 | End: 2020-11-10
Attending: EMERGENCY MEDICINE | Admitting: STUDENT IN AN ORGANIZED HEALTH CARE EDUCATION/TRAINING PROGRAM
Payer: MEDICAID

## 2020-11-08 DIAGNOSIS — R05.9 COUGH: ICD-10-CM

## 2020-11-08 DIAGNOSIS — R09.02 HYPOXIA: ICD-10-CM

## 2020-11-08 LAB
ALBUMIN SERPL BCP-MCNC: 4.1 G/DL (ref 3.2–4.9)
ALBUMIN/GLOB SERPL: 1.4 G/DL
ALP SERPL-CCNC: 90 U/L (ref 30–99)
ALT SERPL-CCNC: 23 U/L (ref 2–50)
ANION GAP SERPL CALC-SCNC: 9 MMOL/L (ref 7–16)
APPEARANCE UR: CLEAR
AST SERPL-CCNC: 15 U/L (ref 12–45)
BASOPHILS # BLD AUTO: 0 % (ref 0–1.8)
BASOPHILS # BLD: 0 K/UL (ref 0–0.12)
BILIRUB SERPL-MCNC: 1 MG/DL (ref 0.1–1.5)
BILIRUB UR QL STRIP.AUTO: NEGATIVE
BUN SERPL-MCNC: 10 MG/DL (ref 8–22)
CALCIUM SERPL-MCNC: 9.1 MG/DL (ref 8.5–10.5)
CHLORIDE SERPL-SCNC: 102 MMOL/L (ref 96–112)
CO2 SERPL-SCNC: 29 MMOL/L (ref 20–33)
COLOR UR: YELLOW
COVID ORDER STATUS COVID19: NORMAL
CREAT SERPL-MCNC: 0.9 MG/DL (ref 0.5–1.4)
EKG IMPRESSION: NORMAL
EKG IMPRESSION: NORMAL
EOSINOPHIL # BLD AUTO: 0 K/UL (ref 0–0.51)
EOSINOPHIL NFR BLD: 0 % (ref 0–6.9)
ERYTHROCYTE [DISTWIDTH] IN BLOOD BY AUTOMATED COUNT: 56.8 FL (ref 35.9–50)
FLUAV RNA SPEC QL NAA+PROBE: NEGATIVE
FLUBV RNA SPEC QL NAA+PROBE: NEGATIVE
GLOBULIN SER CALC-MCNC: 2.9 G/DL (ref 1.9–3.5)
GLUCOSE SERPL-MCNC: 89 MG/DL (ref 65–99)
GLUCOSE UR STRIP.AUTO-MCNC: NEGATIVE MG/DL
HCT VFR BLD AUTO: 43.3 % (ref 37–47)
HGB BLD-MCNC: 12.7 G/DL (ref 12–16)
KETONES UR STRIP.AUTO-MCNC: NEGATIVE MG/DL
LEUKOCYTE ESTERASE UR QL STRIP.AUTO: NEGATIVE
LYMPHOCYTES # BLD AUTO: 2.04 K/UL (ref 1–4.8)
LYMPHOCYTES NFR BLD: 13 % (ref 22–41)
MAGNESIUM SERPL-MCNC: 2.4 MG/DL (ref 1.5–2.5)
MANUAL DIFF BLD: NORMAL
MCH RBC QN AUTO: 28.3 PG (ref 27–33)
MCHC RBC AUTO-ENTMCNC: 29.3 G/DL (ref 33.6–35)
MCV RBC AUTO: 96.4 FL (ref 81.4–97.8)
MICRO URNS: NORMAL
MONOCYTES # BLD AUTO: 0.82 K/UL (ref 0–0.85)
MONOCYTES NFR BLD AUTO: 5.2 % (ref 0–13.4)
MORPHOLOGY BLD-IMP: NORMAL
NEUTROPHILS # BLD AUTO: 12.83 K/UL (ref 2–7.15)
NEUTROPHILS NFR BLD: 81.7 % (ref 44–72)
NITRITE UR QL STRIP.AUTO: NEGATIVE
NRBC # BLD AUTO: 0 K/UL
NRBC BLD-RTO: 0 /100 WBC
NT-PROBNP SERPL IA-MCNC: 230 PG/ML (ref 0–125)
PH UR STRIP.AUTO: 7.5 [PH] (ref 5–8)
PLATELET # BLD AUTO: 313 K/UL (ref 164–446)
PLATELET BLD QL SMEAR: NORMAL
PMV BLD AUTO: 11.2 FL (ref 9–12.9)
POTASSIUM SERPL-SCNC: 4.3 MMOL/L (ref 3.6–5.5)
PROT SERPL-MCNC: 7 G/DL (ref 6–8.2)
PROT UR QL STRIP: NEGATIVE MG/DL
RBC # BLD AUTO: 4.49 M/UL (ref 4.2–5.4)
RBC BLD AUTO: NORMAL
RBC UR QL AUTO: NEGATIVE
RSV RNA SPEC QL NAA+PROBE: NEGATIVE
SARS-COV-2 RNA RESP QL NAA+PROBE: NOTDETECTED
SODIUM SERPL-SCNC: 140 MMOL/L (ref 135–145)
SP GR UR STRIP.AUTO: 1.01
SPECIMEN SOURCE: NORMAL
TROPONIN T SERPL-MCNC: 29 NG/L (ref 6–19)
UROBILINOGEN UR STRIP.AUTO-MCNC: 0.2 MG/DL
WBC # BLD AUTO: 15.7 K/UL (ref 4.8–10.8)

## 2020-11-08 PROCEDURE — 83735 ASSAY OF MAGNESIUM: CPT

## 2020-11-08 PROCEDURE — 700102 HCHG RX REV CODE 250 W/ 637 OVERRIDE(OP): Performed by: STUDENT IN AN ORGANIZED HEALTH CARE EDUCATION/TRAINING PROGRAM

## 2020-11-08 PROCEDURE — 770006 HCHG ROOM/CARE - MED/SURG/GYN SEMI*

## 2020-11-08 PROCEDURE — 36415 COLL VENOUS BLD VENIPUNCTURE: CPT

## 2020-11-08 PROCEDURE — 87040 BLOOD CULTURE FOR BACTERIA: CPT

## 2020-11-08 PROCEDURE — 93005 ELECTROCARDIOGRAM TRACING: CPT | Performed by: EMERGENCY MEDICINE

## 2020-11-08 PROCEDURE — 87449 NOS EACH ORGANISM AG IA: CPT

## 2020-11-08 PROCEDURE — 99285 EMERGENCY DEPT VISIT HI MDM: CPT

## 2020-11-08 PROCEDURE — 700105 HCHG RX REV CODE 258: Performed by: EMERGENCY MEDICINE

## 2020-11-08 PROCEDURE — 96365 THER/PROPH/DIAG IV INF INIT: CPT

## 2020-11-08 PROCEDURE — 71275 CT ANGIOGRAPHY CHEST: CPT

## 2020-11-08 PROCEDURE — 80053 COMPREHEN METABOLIC PANEL: CPT

## 2020-11-08 PROCEDURE — C9803 HOPD COVID-19 SPEC COLLECT: HCPCS | Performed by: EMERGENCY MEDICINE

## 2020-11-08 PROCEDURE — 84484 ASSAY OF TROPONIN QUANT: CPT

## 2020-11-08 PROCEDURE — 81003 URINALYSIS AUTO W/O SCOPE: CPT

## 2020-11-08 PROCEDURE — 700111 HCHG RX REV CODE 636 W/ 250 OVERRIDE (IP): Performed by: STUDENT IN AN ORGANIZED HEALTH CARE EDUCATION/TRAINING PROGRAM

## 2020-11-08 PROCEDURE — 99223 1ST HOSP IP/OBS HIGH 75: CPT | Performed by: STUDENT IN AN ORGANIZED HEALTH CARE EDUCATION/TRAINING PROGRAM

## 2020-11-08 PROCEDURE — 0241U HCHG SARS-COV-2 COVID-19 NFCT DS RESP RNA 4 TRGT MIC: CPT

## 2020-11-08 PROCEDURE — 0240U HCHG SARS-COV-2 COVID-19 NFCT DS RESP RNA 3 TRGT MIC: CPT

## 2020-11-08 PROCEDURE — 83880 ASSAY OF NATRIURETIC PEPTIDE: CPT

## 2020-11-08 PROCEDURE — A9270 NON-COVERED ITEM OR SERVICE: HCPCS | Performed by: STUDENT IN AN ORGANIZED HEALTH CARE EDUCATION/TRAINING PROGRAM

## 2020-11-08 PROCEDURE — 5A09357 ASSISTANCE WITH RESPIRATORY VENTILATION, LESS THAN 24 CONSECUTIVE HOURS, CONTINUOUS POSITIVE AIRWAY PRESSURE: ICD-10-PCS | Performed by: STUDENT IN AN ORGANIZED HEALTH CARE EDUCATION/TRAINING PROGRAM

## 2020-11-08 PROCEDURE — 85027 COMPLETE CBC AUTOMATED: CPT

## 2020-11-08 PROCEDURE — 700117 HCHG RX CONTRAST REV CODE 255: Performed by: EMERGENCY MEDICINE

## 2020-11-08 PROCEDURE — 85007 BL SMEAR W/DIFF WBC COUNT: CPT

## 2020-11-08 PROCEDURE — 700111 HCHG RX REV CODE 636 W/ 250 OVERRIDE (IP): Performed by: EMERGENCY MEDICINE

## 2020-11-08 PROCEDURE — 71045 X-RAY EXAM CHEST 1 VIEW: CPT

## 2020-11-08 RX ORDER — BUDESONIDE 0.5 MG/2ML
0.5 INHALANT ORAL
Status: DISCONTINUED | OUTPATIENT
Start: 2020-11-08 | End: 2020-11-10 | Stop reason: HOSPADM

## 2020-11-08 RX ORDER — AZITHROMYCIN 500 MG/5ML
500 INJECTION, POWDER, LYOPHILIZED, FOR SOLUTION INTRAVENOUS EVERY 24 HOURS
Status: DISCONTINUED | OUTPATIENT
Start: 2020-11-09 | End: 2020-11-10

## 2020-11-08 RX ORDER — ATORVASTATIN CALCIUM 20 MG/1
20 TABLET, FILM COATED ORAL
Status: DISCONTINUED | OUTPATIENT
Start: 2020-11-08 | End: 2020-11-10 | Stop reason: HOSPADM

## 2020-11-08 RX ORDER — CHOLECALCIFEROL (VITAMIN D3) 125 MCG
1000 CAPSULE ORAL DAILY
Status: DISCONTINUED | OUTPATIENT
Start: 2020-11-09 | End: 2020-11-10 | Stop reason: HOSPADM

## 2020-11-08 RX ORDER — FLUTICASONE PROPIONATE 50 MCG
1 SPRAY, SUSPENSION (ML) NASAL 2 TIMES DAILY
Status: DISCONTINUED | OUTPATIENT
Start: 2020-11-08 | End: 2020-11-10 | Stop reason: HOSPADM

## 2020-11-08 RX ORDER — BISACODYL 10 MG
10 SUPPOSITORY, RECTAL RECTAL
Status: DISCONTINUED | OUTPATIENT
Start: 2020-11-08 | End: 2020-11-10 | Stop reason: HOSPADM

## 2020-11-08 RX ORDER — PROCHLORPERAZINE EDISYLATE 5 MG/ML
5-10 INJECTION INTRAMUSCULAR; INTRAVENOUS EVERY 4 HOURS PRN
Status: DISCONTINUED | OUTPATIENT
Start: 2020-11-08 | End: 2020-11-10 | Stop reason: HOSPADM

## 2020-11-08 RX ORDER — HALOPERIDOL 5 MG/1
5 TABLET ORAL
Status: DISCONTINUED | OUTPATIENT
Start: 2020-11-08 | End: 2020-11-10 | Stop reason: HOSPADM

## 2020-11-08 RX ORDER — IPRATROPIUM BROMIDE AND ALBUTEROL SULFATE 2.5; .5 MG/3ML; MG/3ML
3 SOLUTION RESPIRATORY (INHALATION) 4 TIMES DAILY
Status: DISCONTINUED | OUTPATIENT
Start: 2020-11-08 | End: 2020-11-09

## 2020-11-08 RX ORDER — ARIPIPRAZOLE 10 MG/1
30 TABLET ORAL EVERY MORNING
Status: DISCONTINUED | OUTPATIENT
Start: 2020-11-09 | End: 2020-11-10 | Stop reason: HOSPADM

## 2020-11-08 RX ORDER — AMOXICILLIN 250 MG
2 CAPSULE ORAL 2 TIMES DAILY
Status: DISCONTINUED | OUTPATIENT
Start: 2020-11-09 | End: 2020-11-10 | Stop reason: HOSPADM

## 2020-11-08 RX ORDER — METHYLPREDNISOLONE SODIUM SUCCINATE 125 MG/2ML
62.5 INJECTION, POWDER, LYOPHILIZED, FOR SOLUTION INTRAMUSCULAR; INTRAVENOUS EVERY 8 HOURS
Status: COMPLETED | OUTPATIENT
Start: 2020-11-08 | End: 2020-11-10

## 2020-11-08 RX ORDER — ONDANSETRON 4 MG/1
4 TABLET, ORALLY DISINTEGRATING ORAL EVERY 4 HOURS PRN
Status: DISCONTINUED | OUTPATIENT
Start: 2020-11-08 | End: 2020-11-10 | Stop reason: HOSPADM

## 2020-11-08 RX ORDER — POLYETHYLENE GLYCOL 3350 17 G/17G
1 POWDER, FOR SOLUTION ORAL
Status: DISCONTINUED | OUTPATIENT
Start: 2020-11-08 | End: 2020-11-10 | Stop reason: HOSPADM

## 2020-11-08 RX ORDER — BENZTROPINE MESYLATE 1 MG/1
1 TABLET ORAL
Status: DISCONTINUED | OUTPATIENT
Start: 2020-11-08 | End: 2020-11-10 | Stop reason: HOSPADM

## 2020-11-08 RX ORDER — MONTELUKAST SODIUM 10 MG/1
10 TABLET ORAL
Status: DISCONTINUED | OUTPATIENT
Start: 2020-11-08 | End: 2020-11-10 | Stop reason: HOSPADM

## 2020-11-08 RX ORDER — HYDROXYZINE 50 MG/1
25 TABLET, FILM COATED ORAL 3 TIMES DAILY PRN
Status: DISCONTINUED | OUTPATIENT
Start: 2020-11-08 | End: 2020-11-10 | Stop reason: HOSPADM

## 2020-11-08 RX ORDER — PROMETHAZINE HYDROCHLORIDE 25 MG/1
12.5-25 SUPPOSITORY RECTAL EVERY 4 HOURS PRN
Status: DISCONTINUED | OUTPATIENT
Start: 2020-11-08 | End: 2020-11-10 | Stop reason: HOSPADM

## 2020-11-08 RX ORDER — LITHIUM CARBONATE 300 MG/1
300 TABLET, FILM COATED, EXTENDED RELEASE ORAL EVERY MORNING
Status: DISCONTINUED | OUTPATIENT
Start: 2020-11-09 | End: 2020-11-10 | Stop reason: HOSPADM

## 2020-11-08 RX ORDER — LITHIUM CARBONATE 300 MG/1
600 TABLET, FILM COATED, EXTENDED RELEASE ORAL EVERY EVENING
Status: DISCONTINUED | OUTPATIENT
Start: 2020-11-08 | End: 2020-11-10 | Stop reason: HOSPADM

## 2020-11-08 RX ORDER — AZITHROMYCIN 500 MG/1
500 INJECTION, POWDER, LYOPHILIZED, FOR SOLUTION INTRAVENOUS ONCE
Status: DISCONTINUED | OUTPATIENT
Start: 2020-11-08 | End: 2020-11-08

## 2020-11-08 RX ORDER — ONDANSETRON 2 MG/ML
4 INJECTION INTRAMUSCULAR; INTRAVENOUS EVERY 4 HOURS PRN
Status: DISCONTINUED | OUTPATIENT
Start: 2020-11-08 | End: 2020-11-10 | Stop reason: HOSPADM

## 2020-11-08 RX ORDER — LITHIUM CARBONATE 300 MG/1
300-600 TABLET, FILM COATED, EXTENDED RELEASE ORAL 2 TIMES DAILY
Status: DISCONTINUED | OUTPATIENT
Start: 2020-11-08 | End: 2020-11-08

## 2020-11-08 RX ORDER — FUROSEMIDE 20 MG/1
20 TABLET ORAL DAILY
Status: DISCONTINUED | OUTPATIENT
Start: 2020-11-09 | End: 2020-11-10 | Stop reason: HOSPADM

## 2020-11-08 RX ORDER — PROMETHAZINE HYDROCHLORIDE 25 MG/1
12.5-25 TABLET ORAL EVERY 4 HOURS PRN
Status: DISCONTINUED | OUTPATIENT
Start: 2020-11-08 | End: 2020-11-10 | Stop reason: HOSPADM

## 2020-11-08 RX ADMIN — BENZTROPINE MESYLATE 1 MG: 1 TABLET ORAL at 21:25

## 2020-11-08 RX ADMIN — ENOXAPARIN SODIUM 40 MG: 40 INJECTION SUBCUTANEOUS at 18:32

## 2020-11-08 RX ADMIN — FLUTICASONE PROPIONATE 50 MCG: 50 SPRAY, METERED NASAL at 18:33

## 2020-11-08 RX ADMIN — MONTELUKAST 10 MG: 10 TABLET, FILM COATED ORAL at 21:25

## 2020-11-08 RX ADMIN — METHYLPREDNISOLONE SODIUM SUCCINATE 62.5 MG: 125 INJECTION, POWDER, FOR SOLUTION INTRAMUSCULAR; INTRAVENOUS at 18:33

## 2020-11-08 RX ADMIN — METHYLPREDNISOLONE SODIUM SUCCINATE 62.5 MG: 125 INJECTION, POWDER, FOR SOLUTION INTRAMUSCULAR; INTRAVENOUS at 21:26

## 2020-11-08 RX ADMIN — HALOPERIDOL 5 MG: 5 TABLET ORAL at 21:25

## 2020-11-08 RX ADMIN — ONDANSETRON 4 MG: 2 INJECTION INTRAMUSCULAR; INTRAVENOUS at 20:57

## 2020-11-08 RX ADMIN — LITHIUM CARBONATE 600 MG: 300 TABLET, FILM COATED, EXTENDED RELEASE ORAL at 18:33

## 2020-11-08 RX ADMIN — CEFTRIAXONE SODIUM 2 G: 2 INJECTION, POWDER, FOR SOLUTION INTRAMUSCULAR; INTRAVENOUS at 15:17

## 2020-11-08 RX ADMIN — IOHEXOL 55 ML: 350 INJECTION, SOLUTION INTRAVENOUS at 13:23

## 2020-11-08 RX ADMIN — ATORVASTATIN CALCIUM 20 MG: 20 TABLET, FILM COATED ORAL at 21:25

## 2020-11-08 RX ADMIN — ONDANSETRON 4 MG: 4 TABLET, ORALLY DISINTEGRATING ORAL at 20:34

## 2020-11-08 ASSESSMENT — COGNITIVE AND FUNCTIONAL STATUS - GENERAL
MOBILITY SCORE: 24
SUGGESTED CMS G CODE MODIFIER MOBILITY: CH
DAILY ACTIVITIY SCORE: 24
SUGGESTED CMS G CODE MODIFIER DAILY ACTIVITY: CH

## 2020-11-08 ASSESSMENT — LIFESTYLE VARIABLES
HAVE YOU EVER FELT YOU SHOULD CUT DOWN ON YOUR DRINKING: NO
ON A TYPICAL DAY WHEN YOU DRINK ALCOHOL HOW MANY DRINKS DO YOU HAVE: 0
EVER HAD A DRINK FIRST THING IN THE MORNING TO STEADY YOUR NERVES TO GET RID OF A HANGOVER: NO
AVERAGE NUMBER OF DAYS PER WEEK YOU HAVE A DRINK CONTAINING ALCOHOL: 0
TOTAL SCORE: 0
TOTAL SCORE: 0
HOW MANY TIMES IN THE PAST YEAR HAVE YOU HAD 5 OR MORE DRINKS IN A DAY: 0
ALCOHOL_USE: NO
TOTAL SCORE: 0
CONSUMPTION TOTAL: NEGATIVE
DOES PATIENT WANT TO STOP DRINKING: NO
HAVE PEOPLE ANNOYED YOU BY CRITICIZING YOUR DRINKING: NO
EVER FELT BAD OR GUILTY ABOUT YOUR DRINKING: NO

## 2020-11-08 ASSESSMENT — ENCOUNTER SYMPTOMS
NAUSEA: 0
BACK PAIN: 0
SHORTNESS OF BREATH: 1
FOCAL WEAKNESS: 0
SPUTUM PRODUCTION: 0
CLAUDICATION: 0
TREMORS: 0
COUGH: 1
WHEEZING: 0
CHILLS: 0
EYES NEGATIVE: 1
ABDOMINAL PAIN: 0
WEAKNESS: 0
VOMITING: 0
MYALGIAS: 0
CONSTIPATION: 0
NECK PAIN: 0
SPEECH CHANGE: 0
STRIDOR: 0
WEIGHT LOSS: 0
SENSORY CHANGE: 0
NERVOUS/ANXIOUS: 1
HALLUCINATIONS: 0
DIZZINESS: 0
PALPITATIONS: 1
TINGLING: 0
FEVER: 0
DIARRHEA: 0
HEADACHES: 0
ORTHOPNEA: 1
DEPRESSION: 0

## 2020-11-08 ASSESSMENT — PAIN DESCRIPTION - PAIN TYPE: TYPE: ACUTE PAIN

## 2020-11-08 ASSESSMENT — FIBROSIS 4 INDEX: FIB4 SCORE: 0.6

## 2020-11-08 NOTE — ED TRIAGE NOTES
[facility] TRIAGE OFFICER ER REPORT  Consult/Admission requested by: Dr Ritter  Chief complaint: CP  Pertinent history/ER Course: cough, CP and SOB, feels generally sick.  COVID neg.  CTA chest neg for PE, possible early pneumonia.  WBC 15.  Drops to 85% on RA with ambulation  Code Status: full per ERP, I personally verified with the ERP patient's code status and the ERP has confirmed this with the patient.   Patient meets admission criterion: Yes..  Recommendations given or work up & consultations requested per triage officer:   Consultants involved and pertinent input from consultants:   Admission status: Inpatient.   Admission order placed: Yes.   Floor requested: med    Assigned hospitalist: CORTNEY Rivero

## 2020-11-08 NOTE — ED TRIAGE NOTES
BIB [facility] to rm 28, pt is coming from home  Chief Complaint   Patient presents with   • Cough     x1 day, RUQ pain secondary to cough     Was seen here last week for same, completed her abd, cough started again today. Dry non productive cough, lungs clear, had a negative COVID test on 11/3.

## 2020-11-08 NOTE — H&P
Hospital Medicine History & Physical Note    Date of Service  11/8/2020    Primary Care Physician  SHANKAR Gleason    Consultants  none    Code Status  Full Code    Chief Complaint  Chief Complaint   Patient presents with   • Cough     x1 day, RUQ pain secondary to cough       History of Presenting Illness  45 y.o. female who presented 11/8/2020 with past medical history of morbid obesity, COPD presented to the ER with chief complaint of sudden onset of worsening shortness of breath for 1 day.  Shortness of breath worsens with exertion including talking.  She had similar complaint about 1 week ago for which she visited the ER 1 week ago but she did not need any admission and at that time the shortness of breath improved on its own.  Today her shortness of breath is associated with chest tightness in the middle of the chest which she describes a pain of 10/10 in severity, she denies any radiation, aggravating or elevating factors but states that the patient has been constant since the start of the shortness of breath.  She also complains of mild cough but denies any productive sputum.  She denies any fever chills, nausea, vomiting, richy pain, normal or bladder habit changes, new onset focal weakness, tingling. she tested negative for Covid 2 times in the past 1 week.  CT of the chest was done which is negative for any PE.  Her EKG showed normal sinus rhythm without any ST-T wave changes and no signs of ischemia, her troponin of 29.  Her labs are significant for a white count of 15 K.  Patient will be admitted for acute COPD exacerbation with pneumonia versus Covid pneumonia.    Review of Systems  Review of Systems   Constitutional: Negative for chills, fever and weight loss.   HENT: Negative for congestion, ear pain, hearing loss, nosebleeds and tinnitus.    Eyes: Negative.    Respiratory: Positive for cough and shortness of breath. Negative for sputum production, wheezing and stridor.    Cardiovascular:  Positive for chest pain, palpitations and orthopnea. Negative for claudication and leg swelling.   Gastrointestinal: Negative for abdominal pain, constipation, diarrhea, nausea and vomiting.   Genitourinary: Negative for dysuria, frequency and urgency.   Musculoskeletal: Negative for back pain, myalgias and neck pain.   Neurological: Negative for dizziness, tingling, tremors, sensory change, speech change, focal weakness, weakness and headaches.   Psychiatric/Behavioral: Negative for depression, hallucinations and suicidal ideas. The patient is nervous/anxious.        Past Medical History   has a past medical history of Asthma, Bipolar 2 disorder ([facility]), Chickenpox, Other psychological stress, and Schizophrenic disorder ([facility]).    Surgical History   has a past surgical history that includes hysterectomy laparoscopy; colectomy; knee arthroscopy; and cholecystectomy.     Family History  family history includes Cancer in her sister; No Known Problems in her mother.     Social History   reports that she quit smoking about 2 years ago. Her smoking use included cigarettes. She has a 20.00 pack-year smoking history. She has never used smokeless tobacco. She reports that she does not drink alcohol or use drugs.    Allergies  Allergies   Allergen Reactions   • Amoxicillin Rash     Tolerates cephalosporins   • Penicillin G Itching       Medications  Prior to Admission Medications   Prescriptions Last Dose Informant Patient Reported? Taking?   acetaminophen (TYLENOL) 325 MG Tab  Patient No No   Sig: Take 2 Tabs by mouth every 6 hours as needed (Mild Pain; (Pain scale 1-3); Temp greater than 100.5 F).   albuterol 108 (90 Base) MCG/ACT Aero Soln inhalation aerosol  Patient No No   Sig: Inhale 2 Puffs by mouth every 6 hours as needed for Shortness of Breath.   albuterol 108 (90 Base) MCG/ACT Aero Soln inhalation aerosol   No No   Sig: Inhale 2 Puffs by mouth every 6 hours as needed for Shortness of Breath.   aripiprazole (ABILIFY)  30 MG tablet  Patient Yes No   Sig: Take 30 mg by mouth every morning.   atorvastatin (LIPITOR) 20 MG Tab  Patient Yes No   Sig: Take 20 mg by mouth every bedtime.   atorvastatin (LIPITOR) 20 MG Tab   No No   Sig: Take 1 Tab by mouth every day.   Patient not taking: Reported on 8/28/2020   benztropine (COGENTIN) 1 MG Tab  Patient Yes No   Sig: Take 1 mg by mouth every bedtime.   cyanocobalamin (VITAMIN B12) 1000 MCG Tab  Patient Yes No   Sig: Take 1 Tab by mouth every day.   fluticasone (FLONASE) 50 MCG/ACT nasal spray  Patient Yes No   Sig: Spray 1 Spray in nose 2 Times a Day.   furosemide (LASIX) 20 MG Tab  Patient No No   Sig: Take 1 Tab by mouth every day.   furosemide (LASIX) 20 MG Tab   No No   Sig: Take 1 Tab by mouth every day.   Patient not taking: Reported on 8/28/2020   haloperidol (HALDOL) 5 MG Tab  Patient Yes No   Sig: Take 5 mg by mouth every bedtime.   hydrOXYzine HCl (ATARAX) 25 MG Tab  Patient Yes No   Sig: Take 25 mg by mouth 3 times a day as needed for Anxiety.   ipratropium-albuterol (DUONEB) 0.5-2.5 (3) MG/3ML nebulizer solution   No No   Sig: 3 mL by Nebulization route every 6 hours as needed for Shortness of Breath.   lithium CR (LITHOBID) 300 MG Tab CR  Patient Yes No   Sig: Take 300-600 mg by mouth 2 Times a Day. 300 mg in AM and 600 mg at bedtime   montelukast (SINGULAIR) 10 MG Tab  Patient Yes No   Sig: Take 10 mg by mouth every bedtime.   montelukast (SINGULAIR) 10 MG Tab   No No   Sig: Take 1 Tab by mouth every day.   tiotropium (SPIRIVA HANDIHALER) 18 MCG Cap   No No   Sig: Inhale 1 Cap by mouth every day.   tiotropium (SPIRIVA) 18 MCG Cap  Patient No No   Sig: Inhale 1 Cap by mouth every day.   Patient not taking: Reported on 8/28/2020      Facility-Administered Medications: None       Physical Exam  Temp:  [37.1 °C (98.7 °F)] 37.1 °C (98.7 °F)  Pulse:  [79-83] 83  Resp:  [21-26] 21  BP: (130-156)/(58-65) 145/58  SpO2:  [90 %-94 %] 90 %    Physical Exam  Vitals signs and nursing note  reviewed.   Constitutional:       General: She is in acute distress.      Appearance: Normal appearance. She is obese. She is ill-appearing.   HENT:      Head: Normocephalic and atraumatic.      Right Ear: External ear normal.      Left Ear: External ear normal.      Mouth/Throat:      Mouth: Mucous membranes are moist.   Eyes:      Extraocular Movements: Extraocular movements intact.      Conjunctiva/sclera: Conjunctivae normal.      Pupils: Pupils are equal, round, and reactive to light.   Neck:      Musculoskeletal: Normal range of motion and neck supple.   Cardiovascular:      Rate and Rhythm: Normal rate and regular rhythm.      Pulses: Normal pulses.      Heart sounds: Normal heart sounds.   Pulmonary:      Effort: Pulmonary effort is normal.      Breath sounds: Normal breath sounds.      Comments: Patient in severe respiratory distress  Patient saturating 92% with oxygen via nasal cannula  Bilateral poor air entry  Abdominal:      General: Abdomen is flat. Bowel sounds are normal.      Palpations: Abdomen is soft.   Musculoskeletal: Normal range of motion.   Skin:     General: Skin is warm.      Capillary Refill: Capillary refill takes less than 2 seconds.   Neurological:      General: No focal deficit present.      Mental Status: She is alert and oriented to person, place, and time.   Psychiatric:         Mood and Affect: Mood normal.         Laboratory:  Recent Labs     11/08/20  1050   WBC 15.7*   RBC 4.49   HEMOGLOBIN 12.7   HEMATOCRIT 43.3   MCV 96.4   MCH 28.3   MCHC 29.3*   RDW 56.8*   PLATELETCT 313   MPV 11.2     Recent Labs     11/08/20  1050   SODIUM 140   POTASSIUM 4.3   CHLORIDE 102   CO2 29   GLUCOSE 89   BUN 10   CREATININE 0.90   CALCIUM 9.1     Recent Labs     11/08/20  1050   ALTSGPT 23   ASTSGOT 15   ALKPHOSPHAT 90   TBILIRUBIN 1.0   GLUCOSE 89         Recent Labs     11/08/20  1050   NTPROBNP 230*         Recent Labs     11/08/20  1050   TROPONINT 29*       Imaging:  CT-CTA CHEST PULMONARY  ARTERY W/ RECONS   Final Result      No central or segmental pulmonary embolus is identified.      Minimal lingula and anterior left lower lobe atelectasis.                  DX-CHEST-PORTABLE (1 VIEW)   Final Result      Mild cardiomegaly.            Assessment/Plan:  I anticipate this patient will require at least two midnights for appropriate medical management, necessitating inpatient admission.    Acute on chronic respiratory failure with hypoxia (HCC)- (present on admission)  Assessment & Plan  Likely secondary to the early developing pneumonia versus Covid pneumonia and restrictive lung disease from morbid obesity  Continue with oxygen via nasal cannula  We will recheck Covid testing in view of hyper probability for Covid  Continue with isolation protocols as of now      KATHIA and COPD overlap syndrome (HCC)- (present on admission)  Assessment & Plan  Continue with CPAP nightly nightly    Cardiomegaly- (present on admission)  Assessment & Plan  Continue with Lasix 20 mg p.o.    Hx of  Bipolar affective disorder, and schizophrenia- (present on admission)  Assessment & Plan  Continue with lithium 300 mg p.o. once daily  Continue with aripiprazole  Follow-up lithium levels    COPD (chronic obstructive pulmonary disease) (HCC)- (present on admission)  Assessment & Plan  Continue with Solu-Medrol 125 mg every 8 hourly  Continue with Rocephin and azithromycin  Follow blood cultures, urine cultures urine analysis  Continue with DuoNeb inhalation q. fourth hourly and Pulmicort  Consider BiPAP if patient saturation dropping in spite of oxygen via nasal cannula    DVT prophylaxis: SCD enoxaparin    DVT prophylaxis: SCD and Lovenox

## 2020-11-08 NOTE — ED PROVIDER NOTES
ED Provider Note    CHIEF COMPLAINT  Cough, chest pain    HPI  Lorene Haro is a 45 y.o. female who presents complaining of cough and chest pain and shortness of breath.  She has been sick for over a week.  She was seen here a week ago and diagnosed with dyspnea.  X-ray showed some pulmonary edema versus infiltrates, mild elevated BNP, mildly elevated leukocytes, and negative Covid test.  She has been feeling continuously worse since that time.  She also is now having some chest pain when she coughs and breathes.  This is on the left-hand side.  She is nothing that she is swollen up at all.  Denies any leg pain. Continues have a cough.  Nonproductive.  She denies any difficulty sleeping, no PND orthopnea.  When she exerts herself she gets quite dizzy and short of breath.  There is no other complaint.    PAST MEDICAL HISTORY  Past Medical History:   Diagnosis Date   • Asthma    • Bipolar 2 disorder (HCC)    • Chickenpox    • Other psychological stress    • Schizophrenic disorder (HCC)        FAMILY HISTORY  Family History   Problem Relation Age of Onset   • No Known Problems Mother    • Cancer Sister        SOCIAL HISTORY  Social History     Tobacco Use   • Smoking status: Former Smoker     Packs/day: 1.00     Years: 20.00     Pack years: 20.00     Types: Cigarettes     Quit date: 2018     Years since quittin.4   • Smokeless tobacco: Never Used   • Tobacco comment: since , trying to quit   Substance Use Topics   • Alcohol use: No   • Drug use: No         SURGICAL HISTORY  Past Surgical History:   Procedure Laterality Date   • CHOLECYSTECTOMY     • COLECTOMY     • HYSTERECTOMY LAPAROSCOPY     • KNEE ARTHROSCOPY         CURRENT MEDICATIONS    I have reviewed the nurses notes and/or the list brought with the patient.    ALLERGIES  Allergies   Allergen Reactions   • Amoxicillin Rash   • Penicillin G Itching       REVIEW OF SYSTEMS  See HPI for further details. Review of systems as above, otherwise all  "other systems are negative.     PHYSICAL EXAM  VITAL SIGNS: /58   Pulse 79   Temp 37.1 °C (98.7 °F) (Oral)   Resp (!) 26   Ht 1.549 m (5' 1\")   Wt 123.4 kg (272 lb)   LMP  (LMP Unknown)   SpO2 91%   BMI 51.39 kg/m²     Constitutional: Well appearing patient in no acute distress.  Not toxic, nor ill in appearance.  Saturation is between 90 and 93% during my interview.  HENT: Mucus membranes moist.  Oropharynx is clear.  Eyes: Pupils equally round.  No scleral icterus.   Neck: Full nontender range of motion.  Lymphatic: No cervical lymphadenopathy noted.   Cardiovascular: Regular heart rate and rhythm.  No murmurs, rubs, nor gallop appreciated.   Thorax & Lungs: Chest is nontender.  Lungs are clear to auscultation with good air movement bilaterally.  No wheeze, rhonchi, nor rales.   Abdomen: Soft, with no tenderness, rebound nor guarding.  No mass, pulsatile mass, nor hepatosplenomegaly appreciated.  Skin: No purpura nor petechia noted.  Extremities/Musculoskeletal: No sign of trauma.  Calves are nontender with no cords nor edema.  No Aspen's sign.  Pulses are intact all around.   Neurologic: Alert & oriented.  Strength and sensation is intact all around.   Psychiatric: Normal affect appropriate for the clinical situation.    EKG  I interpreted this EKG myself.  This is a 12-lead study.  The rhythm is sinus with a rate of 78.  There are no ST segment nor T wave abnormalities.  Interpretation: No ST segment elevation myocardial infarction.    LABS  Labs Reviewed   CBC WITH DIFFERENTIAL - Abnormal; Notable for the following components:       Result Value    WBC 15.7 (*)     MCHC 29.3 (*)     RDW 56.8 (*)     Neutrophils-Polys 81.70 (*)     Lymphocytes 13.00 (*)     Neutrophils (Absolute) 12.83 (*)     All other components within normal limits   TROPONIN - Abnormal; Notable for the following components:    Troponin T 29 (*)     All other components within normal limits   PROBRAIN NATRIURETIC PEPTIDE, NT - " "Abnormal; Notable for the following components:    NT-proBNP 230 (*)     All other components within normal limits   COMP METABOLIC PANEL   BLOOD CULTURE    Narrative:     Per Hospital Policy: Only change Specimen Src: to \"Line\" if  specified by physician order.   COVID/SARS COV-2    Narrative:     Is patient being admitted?->Yes  Does this patient meet criteria for Rush/Cepheid per [facility]  Inpatient Workflow? (See workflow link below)->Yes  Expected turn around time?->Rush (Cepheid 2-4 hours)  Is this the patients First SARS CoV-2 test?->No  Is this patient employed in healthcare?->No  Is the patient symptomatic as defined by the CDC?->Yes  Date of symptom onset?->11/1/20  Is the patient hospitalized?->No  Is the patient a resident in a congregate care  setting?->Unknown  Is the patient pregnant?->Unknown   COV-2, FLU A/B, AND RSV BY PCR    Narrative:     Is patient being admitted?->Yes  Does this patient meet criteria for Rush/Cepheid per [facility]  Inpatient Workflow? (See workflow link below)->Yes  Expected turn around time?->Rush (Cepheid 2-4 hours)  Is this the patients First SARS CoV-2 test?->No  Is this patient employed in healthcare?->No  Is the patient symptomatic as defined by the CDC?->Yes  Date of symptom onset?->11/1/20  Is the patient hospitalized?->No  Is the patient a resident in a congregate care  setting?->Unknown  Is the patient pregnant?->Unknown   DIFFERENTIAL MANUAL   PERIPHERAL SMEAR REVIEW   PLATELET ESTIMATE   MORPHOLOGY   ESTIMATED GFR   MAGNESIUM   URINALYSIS    Narrative:     Culture if indicated  Droplet, Contact, and Eye Protection   COVID/SARS COV-2   LEGIONELLA PNEUMOPHILA UR AG    Narrative:     Culture if indicated  Droplet, Contact, and Eye Protection   BLOOD CULTURE   INFLUENZA A/B BY PCR         RADIOLOGY/PROCEDURES  I have reviewed the patient's film interpretations myself, and they are read out by the radiologist as:   CT-CTA CHEST PULMONARY ARTERY W/ RECONS   Final Result    "   No central or segmental pulmonary embolus is identified.      Minimal lingula and anterior left lower lobe atelectasis.                  DX-CHEST-PORTABLE (1 VIEW)   Final Result      Mild cardiomegaly.        .    MEDICAL RECORD  I have reviewed patient's medical record and pertinent results are listed above.    COURSE & MEDICAL DECISION MAKING  I have reviewed any medical record information, laboratory studies and radiographic results as noted above.  This patient presents with shortness of breath, cough.  She has had Covid test already to her negative I repeated this is again is negative.  Her lungs are not particularly horrible to auscultation, and with that pleuritic pain I went ahead and ordered a CT scan.  This does not show any evidence of a pulmonary embolism.  She does have the atelectasis on the left lower lobe.  One is could represent a pneumonia.  Because of this and her leukocytosis, I did go ahead and order antibiotics to cover community-acquired pneumonia.  Although the patient seems to be normoxic on room air while resting, she does desaturate in the mid 80s when she ambulates.  Because of this, I called and spoke with [facility] hospitalist, I would like to put her in the hospital for further observation and work-up.    FINAL IMPRESSION  1. Cough    2. Hypoxia           This dictation was created using voice recognition software.    Electronically signed by: Jordy Ritter M.D., 11/8/2020 11:10 AM

## 2020-11-09 ENCOUNTER — PATIENT OUTREACH (OUTPATIENT)
Dept: HEALTH INFORMATION MANAGEMENT | Facility: OTHER | Age: 45
End: 2020-11-09

## 2020-11-09 ENCOUNTER — DOCUMENTATION (OUTPATIENT)
Dept: RESPIRATORY THERAPY | Facility: MEDICAL CENTER | Age: 45
End: 2020-11-09

## 2020-11-09 LAB
ALBUMIN SERPL BCP-MCNC: 3.9 G/DL (ref 3.2–4.9)
ALBUMIN/GLOB SERPL: 1.3 G/DL
ALP SERPL-CCNC: 89 U/L (ref 30–99)
ALT SERPL-CCNC: 18 U/L (ref 2–50)
ANION GAP SERPL CALC-SCNC: 11 MMOL/L (ref 7–16)
AST SERPL-CCNC: 9 U/L (ref 12–45)
BASOPHILS # BLD AUTO: 0.1 % (ref 0–1.8)
BASOPHILS # BLD: 0.02 K/UL (ref 0–0.12)
BILIRUB SERPL-MCNC: 0.6 MG/DL (ref 0.1–1.5)
BUN SERPL-MCNC: 10 MG/DL (ref 8–22)
CALCIUM SERPL-MCNC: 9.1 MG/DL (ref 8.5–10.5)
CHLORIDE SERPL-SCNC: 101 MMOL/L (ref 96–112)
CO2 SERPL-SCNC: 24 MMOL/L (ref 20–33)
CREAT SERPL-MCNC: 0.97 MG/DL (ref 0.5–1.4)
CRP SERPL HS-MCNC: 3.47 MG/DL (ref 0–0.75)
EOSINOPHIL # BLD AUTO: 0 K/UL (ref 0–0.51)
EOSINOPHIL NFR BLD: 0 % (ref 0–6.9)
ERYTHROCYTE [DISTWIDTH] IN BLOOD BY AUTOMATED COUNT: 56.5 FL (ref 35.9–50)
FERRITIN SERPL-MCNC: 160 NG/ML (ref 10–291)
GLOBULIN SER CALC-MCNC: 3 G/DL (ref 1.9–3.5)
GLUCOSE BLD-MCNC: 169 MG/DL (ref 65–99)
GLUCOSE BLD-MCNC: 173 MG/DL (ref 65–99)
GLUCOSE BLD-MCNC: 205 MG/DL (ref 65–99)
GLUCOSE SERPL-MCNC: 242 MG/DL (ref 65–99)
HCT VFR BLD AUTO: 42.6 % (ref 37–47)
HGB BLD-MCNC: 12.7 G/DL (ref 12–16)
IMM GRANULOCYTES # BLD AUTO: 0.11 K/UL (ref 0–0.11)
IMM GRANULOCYTES NFR BLD AUTO: 0.8 % (ref 0–0.9)
LITHIUM SERPL-MCNC: 0.9 MMOL/L (ref 0.6–1.2)
LYMPHOCYTES # BLD AUTO: 0.48 K/UL (ref 1–4.8)
LYMPHOCYTES NFR BLD: 3.4 % (ref 22–41)
MCH RBC QN AUTO: 28.8 PG (ref 27–33)
MCHC RBC AUTO-ENTMCNC: 29.8 G/DL (ref 33.6–35)
MCV RBC AUTO: 96.6 FL (ref 81.4–97.8)
MONOCYTES # BLD AUTO: 0.04 K/UL (ref 0–0.85)
MONOCYTES NFR BLD AUTO: 0.3 % (ref 0–13.4)
NEUTROPHILS # BLD AUTO: 13.46 K/UL (ref 2–7.15)
NEUTROPHILS NFR BLD: 95.4 % (ref 44–72)
NRBC # BLD AUTO: 0 K/UL
NRBC BLD-RTO: 0 /100 WBC
PLATELET # BLD AUTO: 285 K/UL (ref 164–446)
PMV BLD AUTO: 11.3 FL (ref 9–12.9)
POTASSIUM SERPL-SCNC: 4.6 MMOL/L (ref 3.6–5.5)
PROCALCITONIN SERPL-MCNC: <0.05 NG/ML
PROT SERPL-MCNC: 6.9 G/DL (ref 6–8.2)
RBC # BLD AUTO: 4.41 M/UL (ref 4.2–5.4)
SODIUM SERPL-SCNC: 136 MMOL/L (ref 135–145)
TROPONIN T SERPL-MCNC: 15 NG/L (ref 6–19)
TROPONIN T SERPL-MCNC: 16 NG/L (ref 6–19)
TROPONIN T SERPL-MCNC: 24 NG/L (ref 6–19)
WBC # BLD AUTO: 14.1 K/UL (ref 4.8–10.8)

## 2020-11-09 PROCEDURE — A9270 NON-COVERED ITEM OR SERVICE: HCPCS | Performed by: STUDENT IN AN ORGANIZED HEALTH CARE EDUCATION/TRAINING PROGRAM

## 2020-11-09 PROCEDURE — 82728 ASSAY OF FERRITIN: CPT

## 2020-11-09 PROCEDURE — 36415 COLL VENOUS BLD VENIPUNCTURE: CPT

## 2020-11-09 PROCEDURE — 82962 GLUCOSE BLOOD TEST: CPT | Mod: 91

## 2020-11-09 PROCEDURE — 700102 HCHG RX REV CODE 250 W/ 637 OVERRIDE(OP): Performed by: STUDENT IN AN ORGANIZED HEALTH CARE EDUCATION/TRAINING PROGRAM

## 2020-11-09 PROCEDURE — 94660 CPAP INITIATION&MGMT: CPT

## 2020-11-09 PROCEDURE — 94640 AIRWAY INHALATION TREATMENT: CPT

## 2020-11-09 PROCEDURE — 86140 C-REACTIVE PROTEIN: CPT

## 2020-11-09 PROCEDURE — 84484 ASSAY OF TROPONIN QUANT: CPT | Mod: 91

## 2020-11-09 PROCEDURE — 700101 HCHG RX REV CODE 250: Performed by: STUDENT IN AN ORGANIZED HEALTH CARE EDUCATION/TRAINING PROGRAM

## 2020-11-09 PROCEDURE — 94760 N-INVAS EAR/PLS OXIMETRY 1: CPT

## 2020-11-09 PROCEDURE — 80178 ASSAY OF LITHIUM: CPT

## 2020-11-09 PROCEDURE — 84145 PROCALCITONIN (PCT): CPT

## 2020-11-09 PROCEDURE — 770006 HCHG ROOM/CARE - MED/SURG/GYN SEMI*

## 2020-11-09 PROCEDURE — 80053 COMPREHEN METABOLIC PANEL: CPT

## 2020-11-09 PROCEDURE — 99233 SBSQ HOSP IP/OBS HIGH 50: CPT | Performed by: INTERNAL MEDICINE

## 2020-11-09 PROCEDURE — 5A09357 ASSISTANCE WITH RESPIRATORY VENTILATION, LESS THAN 24 CONSECUTIVE HOURS, CONTINUOUS POSITIVE AIRWAY PRESSURE: ICD-10-PCS | Performed by: STUDENT IN AN ORGANIZED HEALTH CARE EDUCATION/TRAINING PROGRAM

## 2020-11-09 PROCEDURE — 700105 HCHG RX REV CODE 258: Performed by: STUDENT IN AN ORGANIZED HEALTH CARE EDUCATION/TRAINING PROGRAM

## 2020-11-09 PROCEDURE — 85025 COMPLETE CBC W/AUTO DIFF WBC: CPT

## 2020-11-09 PROCEDURE — 700111 HCHG RX REV CODE 636 W/ 250 OVERRIDE (IP): Performed by: STUDENT IN AN ORGANIZED HEALTH CARE EDUCATION/TRAINING PROGRAM

## 2020-11-09 RX ORDER — ACETAMINOPHEN 325 MG/1
650 TABLET ORAL EVERY 4 HOURS PRN
Status: DISCONTINUED | OUTPATIENT
Start: 2020-11-09 | End: 2020-11-10 | Stop reason: HOSPADM

## 2020-11-09 RX ADMIN — IPRATROPIUM BROMIDE AND ALBUTEROL SULFATE 3 ML: .5; 3 SOLUTION RESPIRATORY (INHALATION) at 07:31

## 2020-11-09 RX ADMIN — ONDANSETRON 4 MG: 2 INJECTION INTRAMUSCULAR; INTRAVENOUS at 00:50

## 2020-11-09 RX ADMIN — IPRATROPIUM BROMIDE AND ALBUTEROL SULFATE 3 ML: .5; 3 SOLUTION RESPIRATORY (INHALATION) at 01:27

## 2020-11-09 RX ADMIN — ENOXAPARIN SODIUM 40 MG: 40 INJECTION SUBCUTANEOUS at 04:55

## 2020-11-09 RX ADMIN — FLUTICASONE PROPIONATE 50 MCG: 50 SPRAY, METERED NASAL at 17:23

## 2020-11-09 RX ADMIN — FLUTICASONE PROPIONATE 50 MCG: 50 SPRAY, METERED NASAL at 04:55

## 2020-11-09 RX ADMIN — PROCHLORPERAZINE EDISYLATE 10 MG: 5 INJECTION INTRAMUSCULAR; INTRAVENOUS at 22:59

## 2020-11-09 RX ADMIN — CYANOCOBALAMIN TAB 500 MCG 1000 MCG: 500 TAB at 04:55

## 2020-11-09 RX ADMIN — ARIPIPRAZOLE 30 MG: 10 TABLET ORAL at 04:55

## 2020-11-09 RX ADMIN — ONDANSETRON 4 MG: 2 INJECTION INTRAMUSCULAR; INTRAVENOUS at 20:06

## 2020-11-09 RX ADMIN — ONDANSETRON 4 MG: 2 INJECTION INTRAMUSCULAR; INTRAVENOUS at 13:33

## 2020-11-09 RX ADMIN — METHYLPREDNISOLONE SODIUM SUCCINATE 62.5 MG: 125 INJECTION, POWDER, FOR SOLUTION INTRAMUSCULAR; INTRAVENOUS at 21:25

## 2020-11-09 RX ADMIN — LITHIUM CARBONATE 300 MG: 300 TABLET, FILM COATED, EXTENDED RELEASE ORAL at 04:55

## 2020-11-09 RX ADMIN — AZITHROMYCIN MONOHYDRATE 500 MG: 500 INJECTION, POWDER, LYOPHILIZED, FOR SOLUTION INTRAVENOUS at 06:15

## 2020-11-09 RX ADMIN — ACETAMINOPHEN 650 MG: 325 TABLET, FILM COATED ORAL at 00:50

## 2020-11-09 RX ADMIN — METHYLPREDNISOLONE SODIUM SUCCINATE 62.5 MG: 125 INJECTION, POWDER, FOR SOLUTION INTRAMUSCULAR; INTRAVENOUS at 13:33

## 2020-11-09 RX ADMIN — HALOPERIDOL 5 MG: 5 TABLET ORAL at 21:25

## 2020-11-09 RX ADMIN — METHYLPREDNISOLONE SODIUM SUCCINATE 62.5 MG: 125 INJECTION, POWDER, FOR SOLUTION INTRAMUSCULAR; INTRAVENOUS at 04:55

## 2020-11-09 RX ADMIN — CEFTRIAXONE SODIUM 2 G: 2 INJECTION, POWDER, FOR SOLUTION INTRAMUSCULAR; INTRAVENOUS at 05:28

## 2020-11-09 RX ADMIN — ATORVASTATIN CALCIUM 20 MG: 20 TABLET, FILM COATED ORAL at 21:25

## 2020-11-09 RX ADMIN — FUROSEMIDE 20 MG: 20 TABLET ORAL at 04:55

## 2020-11-09 RX ADMIN — IPRATROPIUM BROMIDE AND ALBUTEROL SULFATE 3 ML: .5; 3 SOLUTION RESPIRATORY (INHALATION) at 15:59

## 2020-11-09 RX ADMIN — DOCUSATE SODIUM 50 MG AND SENNOSIDES 8.6 MG 2 TABLET: 8.6; 5 TABLET, FILM COATED ORAL at 04:54

## 2020-11-09 RX ADMIN — BUDESONIDE 0.5 MG: 0.5 SUSPENSION RESPIRATORY (INHALATION) at 07:31

## 2020-11-09 RX ADMIN — ACETAMINOPHEN 650 MG: 325 TABLET, FILM COATED ORAL at 13:33

## 2020-11-09 RX ADMIN — DOCUSATE SODIUM 50 MG AND SENNOSIDES 8.6 MG 2 TABLET: 8.6; 5 TABLET, FILM COATED ORAL at 17:23

## 2020-11-09 RX ADMIN — LITHIUM CARBONATE 600 MG: 300 TABLET, FILM COATED, EXTENDED RELEASE ORAL at 17:23

## 2020-11-09 RX ADMIN — BENZTROPINE MESYLATE 1 MG: 1 TABLET ORAL at 21:24

## 2020-11-09 RX ADMIN — IPRATROPIUM BROMIDE AND ALBUTEROL SULFATE 3 ML: .5; 3 SOLUTION RESPIRATORY (INHALATION) at 20:21

## 2020-11-09 RX ADMIN — MONTELUKAST 10 MG: 10 TABLET, FILM COATED ORAL at 21:25

## 2020-11-09 SDOH — ECONOMIC STABILITY: TRANSPORTATION INSECURITY
IN THE PAST 12 MONTHS, HAS LACK OF TRANSPORTATION KEPT YOU FROM MEETINGS, WORK, OR FROM GETTING THINGS NEEDED FOR DAILY LIVING?: YES

## 2020-11-09 SDOH — ECONOMIC STABILITY: FOOD INSECURITY: WITHIN THE PAST 12 MONTHS, YOU WORRIED THAT YOUR FOOD WOULD RUN OUT BEFORE YOU GOT MONEY TO BUY MORE.: SOMETIMES TRUE

## 2020-11-09 SDOH — ECONOMIC STABILITY: FOOD INSECURITY: WITHIN THE PAST 12 MONTHS, THE FOOD YOU BOUGHT JUST DIDN'T LAST AND YOU DIDN'T HAVE MONEY TO GET MORE.: OFTEN TRUE

## 2020-11-09 SDOH — ECONOMIC STABILITY: TRANSPORTATION INSECURITY
IN THE PAST 12 MONTHS, HAS THE LACK OF TRANSPORTATION KEPT YOU FROM MEDICAL APPOINTMENTS OR FROM GETTING MEDICATIONS?: YES

## 2020-11-09 SDOH — ECONOMIC STABILITY: INCOME INSECURITY: HOW HARD IS IT FOR YOU TO PAY FOR THE VERY BASICS LIKE FOOD, HOUSING, MEDICAL CARE, AND HEATING?: HARD

## 2020-11-09 ASSESSMENT — ENCOUNTER SYMPTOMS
SORE THROAT: 1
FEVER: 0
EYE DISCHARGE: 0
SEIZURES: 0
NAUSEA: 1
SHORTNESS OF BREATH: 1
COUGH: 1
EYE REDNESS: 0
FALLS: 0
VOMITING: 1
LOSS OF CONSCIOUSNESS: 0

## 2020-11-09 ASSESSMENT — PAIN DESCRIPTION - PAIN TYPE
TYPE: ACUTE PAIN
TYPE: ACUTE PAIN

## 2020-11-09 ASSESSMENT — FIBROSIS 4 INDEX: FIB4 SCORE: 0.35

## 2020-11-09 NOTE — ASSESSMENT & PLAN NOTE
Likely secondary to the early developing pneumonia vs viral pneumonia (covid/flu negative) and restrictive lung disease from morbid obesity  Continue with oxygen via nasal cannula

## 2020-11-09 NOTE — PROGRESS NOTES
· 2 RN skin check completed with Tirso GREENE  · Devices in place n/a.  · Skin assessed under devices n/a.  · Confirmed pressure ulcers found on n/a.  · New potential pressure ulcers noted on n/a. Wound consult placed? n/a. Photo uploaded? n/a.   · The following interventions are in place: education to turn/reposition self frequently and ambulate when possible.    Bilateral ears are red, but blanching. Bilateral shins are pink, but blanching. Bilateral heels are dry and calloused.

## 2020-11-09 NOTE — CARE PLAN
Problem: Communication  Goal: The ability to communicate needs accurately and effectively will improve  Outcome: PROGRESSING AS EXPECTED  Note: Pt can effectively communicate. Pt uses call light appropriately.      Problem: Safety  Goal: Will remain free from falls  Outcome: PROGRESSING AS EXPECTED  Note: Pt educated on fall precautions. Pt wearing nonskid socks. Pt bed is locked and in lowest position. Pt call light within reach.

## 2020-11-09 NOTE — RESPIRATORY CARE
COPD EDUCATION by COPD CLINICAL EDUCATOR  11/9/2020 at 3:52 PM by Maylin Sullivan, BOLIVAR     COPD Education provided?   Patient unable to participate in full program.  A comprehensive packet including information about COPD and treatments was provided and reviewed with her. She quit smoking. Per [facility], she has COPD/ KATHIA overlap syndrome with recent PFT data in EMR (5/20/2020)  Currently she is using 3 lpm O2 at night with her AVAPS machine.      Action Plan Completed/Updated? updated    Pulmonary Function Testing (PFT)? 5/2020:FEV1 70; FEV1/FVC Ratio-89    Peak Inspiratory flow (PIF)  Patients resistance: 50 L/min    Does patient currently use inhalers/nebulizer at home? Spiriva, Duoneb, Albuterol    Additional medication/equipment recommendations she has a spacer but utilizes her nebulizer     Is all Respiratory DME in place? Oxygen, AVAPS, nebulizer                   If yes, has patient been educated on use of DME?   Reviewed cleaning of all respiratory equipment    Have all necessary follow up appointments been scheduled?   PCP or D/C clinic TBD 14094 for assistance   Specialty [facility]/Sleep: appt requested COPD Navigator assistance to change    Palliative Care Team involved in care, been suggested or consulted? Not with this admision    Has the patient been referred to Pulmonary Rehab? Provided information      [facility]? sent    Home Health referral placed? TBD   Recommended? Yes living situation is changing and requested assistance    Meds To Beds request for review

## 2020-11-09 NOTE — DIETARY
NUTRITION SERVICES:  Pt with BMI 51.39. Morbid obesity. Weight loss counseling not appropriate in acute care setting.     RECOMMENDATION: Referral to outpatient nutrition services for weight management after D/C.

## 2020-11-09 NOTE — PROGRESS NOTES
Community Health Worker Intake  • Social determinates of health intake complete.   • Identified barriers to Food and Transportation.  • Contact information provided to Lorene Haro   • Has PCP appointment scheduled   • Accepted Meds-To-Beds.   • Inpatient assessment completed.    CHW Nelia met with pt bedside to introduce CCM program and give contact information. Pt identifies barriers to food. CHW introduced Food-Rx program (pt accepted). Pt identifies transportation needs. CHW introduced MTM. Pt expressed no other needs at this time.    Plan: CHW will file Food-Rx paper work. CHW will mail MTM information and food bank locations to pt. CHW will follow up with pt after d/c to confirm needs

## 2020-11-09 NOTE — PROGRESS NOTES
Bedside report received from night RN. Assumed care of pt at 0645 . Pt is asleep at this time with equal chest rise and no signs of distress. Pt is A&O x 4. Pt is on 1L. Bed alarm is not in use, bed in lowest position, bed rails up x 2, belongings and call light within reach. Hourly rounding in place.

## 2020-11-09 NOTE — DISCHARGE PLANNING
Anticipated Discharge Disposition:   · Home   Action:   · Reviewed in IDT rounds. Pt is anticipated to discharge tomorrow. Currently weaning oxygen.     Barriers to Discharge:   · No barriers identified at this time.     Plan:   · Care coordination will continue to follow up and provide assistance with discharge plans/barriers as needed.

## 2020-11-09 NOTE — CARE PLAN
Problem: Knowledge Deficit  Goal: Knowledge of disease process/condition, treatment plan, diagnostic tests, and medications will improve  Outcome: PROGRESSING AS EXPECTED  Note: Went over plan of care with patient and answered any questions patient had.      Problem: Respiratory:  Goal: Respiratory status will improve  Outcome: PROGRESSING AS EXPECTED  Note: Patient weaned off 3L O2 to room air.

## 2020-11-09 NOTE — PROGRESS NOTES
Assumed care of patient around 1753. Received report from ER RN prior to arrival. Patient ambulated to bathroom and then bed with wide based gait using FWW. Patient oriented to unit, room, and call light. Bed is in low and locked position. Call light is within reach.

## 2020-11-09 NOTE — ASSESSMENT & PLAN NOTE
Continue with lithium 300 mg p.o. once daily  Continue with aripiprazole  Follow-up lithium levels

## 2020-11-09 NOTE — PROGRESS NOTES
Report received from day shift RN. Assumed care at 1900, assessment complete. Pt is A & O x 4.  Pt denies having any pain at this time. Pt is up sitting in bed. Pt stating that she is very  Hot.. Fall precautions and appropriate signs in place. Pt oriented to unit routine, call light/phone system and RN extension number provided. Pt educated regarding fall precautions. Pt denies any additional needs at this time. Call light within reach.

## 2020-11-09 NOTE — ED NOTES
Med rec updated and complete allergies reviewed.  Met with pt at bedside. Pt denies  Antibiotic use in last 14 days.      Home pharmacy [facility]

## 2020-11-10 ENCOUNTER — PATIENT OUTREACH (OUTPATIENT)
Dept: HEALTH INFORMATION MANAGEMENT | Facility: OTHER | Age: 45
End: 2020-11-10

## 2020-11-10 VITALS
DIASTOLIC BLOOD PRESSURE: 47 MMHG | HEART RATE: 61 BPM | SYSTOLIC BLOOD PRESSURE: 108 MMHG | HEIGHT: 61 IN | TEMPERATURE: 97.8 F | RESPIRATION RATE: 18 BRPM | OXYGEN SATURATION: 94 % | WEIGHT: 290.35 LBS | BODY MASS INDEX: 54.82 KG/M2

## 2020-11-10 PROBLEM — J96.21 ACUTE ON CHRONIC RESPIRATORY FAILURE WITH HYPOXIA (HCC): Status: RESOLVED | Noted: 2019-11-18 | Resolved: 2020-11-10

## 2020-11-10 LAB
BASOPHILS # BLD AUTO: 0.1 % (ref 0–1.8)
BASOPHILS # BLD: 0.02 K/UL (ref 0–0.12)
EOSINOPHIL # BLD AUTO: 0 K/UL (ref 0–0.51)
EOSINOPHIL NFR BLD: 0 % (ref 0–6.9)
ERYTHROCYTE [DISTWIDTH] IN BLOOD BY AUTOMATED COUNT: 53.7 FL (ref 35.9–50)
GLUCOSE BLD-MCNC: 144 MG/DL (ref 65–99)
HCT VFR BLD AUTO: 39.6 % (ref 37–47)
HGB BLD-MCNC: 11.9 G/DL (ref 12–16)
IMM GRANULOCYTES # BLD AUTO: 0.17 K/UL (ref 0–0.11)
IMM GRANULOCYTES NFR BLD AUTO: 1 % (ref 0–0.9)
L PNEUMO AG UR QL IA: NEGATIVE
LYMPHOCYTES # BLD AUTO: 0.68 K/UL (ref 1–4.8)
LYMPHOCYTES NFR BLD: 4 % (ref 22–41)
MCH RBC QN AUTO: 28.1 PG (ref 27–33)
MCHC RBC AUTO-ENTMCNC: 30.1 G/DL (ref 33.6–35)
MCV RBC AUTO: 93.6 FL (ref 81.4–97.8)
MONOCYTES # BLD AUTO: 0.31 K/UL (ref 0–0.85)
MONOCYTES NFR BLD AUTO: 1.8 % (ref 0–13.4)
NEUTROPHILS # BLD AUTO: 15.64 K/UL (ref 2–7.15)
NEUTROPHILS NFR BLD: 93.1 % (ref 44–72)
NRBC # BLD AUTO: 0 K/UL
NRBC BLD-RTO: 0 /100 WBC
PLATELET # BLD AUTO: 292 K/UL (ref 164–446)
PMV BLD AUTO: 11.3 FL (ref 9–12.9)
RBC # BLD AUTO: 4.23 M/UL (ref 4.2–5.4)
WBC # BLD AUTO: 16.8 K/UL (ref 4.8–10.8)

## 2020-11-10 PROCEDURE — 85025 COMPLETE CBC W/AUTO DIFF WBC: CPT

## 2020-11-10 PROCEDURE — A9270 NON-COVERED ITEM OR SERVICE: HCPCS | Performed by: STUDENT IN AN ORGANIZED HEALTH CARE EDUCATION/TRAINING PROGRAM

## 2020-11-10 PROCEDURE — 700105 HCHG RX REV CODE 258: Performed by: STUDENT IN AN ORGANIZED HEALTH CARE EDUCATION/TRAINING PROGRAM

## 2020-11-10 PROCEDURE — 99239 HOSP IP/OBS DSCHRG MGMT >30: CPT | Performed by: INTERNAL MEDICINE

## 2020-11-10 PROCEDURE — 700102 HCHG RX REV CODE 250 W/ 637 OVERRIDE(OP): Performed by: STUDENT IN AN ORGANIZED HEALTH CARE EDUCATION/TRAINING PROGRAM

## 2020-11-10 PROCEDURE — 82962 GLUCOSE BLOOD TEST: CPT

## 2020-11-10 PROCEDURE — 36415 COLL VENOUS BLD VENIPUNCTURE: CPT

## 2020-11-10 PROCEDURE — 700111 HCHG RX REV CODE 636 W/ 250 OVERRIDE (IP): Performed by: STUDENT IN AN ORGANIZED HEALTH CARE EDUCATION/TRAINING PROGRAM

## 2020-11-10 PROCEDURE — 94640 AIRWAY INHALATION TREATMENT: CPT

## 2020-11-10 RX ORDER — NYSTATIN 100000 [USP'U]/G
POWDER TOPICAL 2 TIMES DAILY
Status: DISCONTINUED | OUTPATIENT
Start: 2020-11-10 | End: 2020-11-10 | Stop reason: HOSPADM

## 2020-11-10 RX ORDER — AZITHROMYCIN 250 MG/1
500 TABLET, FILM COATED ORAL DAILY
Status: DISCONTINUED | OUTPATIENT
Start: 2020-11-11 | End: 2020-11-10

## 2020-11-10 RX ADMIN — ENOXAPARIN SODIUM 40 MG: 40 INJECTION SUBCUTANEOUS at 04:55

## 2020-11-10 RX ADMIN — LITHIUM CARBONATE 300 MG: 300 TABLET, FILM COATED, EXTENDED RELEASE ORAL at 04:54

## 2020-11-10 RX ADMIN — AZITHROMYCIN MONOHYDRATE 500 MG: 500 INJECTION, POWDER, LYOPHILIZED, FOR SOLUTION INTRAVENOUS at 06:22

## 2020-11-10 RX ADMIN — DOCUSATE SODIUM 50 MG AND SENNOSIDES 8.6 MG 2 TABLET: 8.6; 5 TABLET, FILM COATED ORAL at 04:54

## 2020-11-10 RX ADMIN — CEFTRIAXONE SODIUM 2 G: 2 INJECTION, POWDER, FOR SOLUTION INTRAMUSCULAR; INTRAVENOUS at 04:53

## 2020-11-10 RX ADMIN — FUROSEMIDE 20 MG: 20 TABLET ORAL at 04:54

## 2020-11-10 RX ADMIN — BUDESONIDE 0.5 MG: 0.5 SUSPENSION RESPIRATORY (INHALATION) at 07:33

## 2020-11-10 RX ADMIN — FLUTICASONE PROPIONATE 50 MCG: 50 SPRAY, METERED NASAL at 04:54

## 2020-11-10 RX ADMIN — CYANOCOBALAMIN TAB 500 MCG 1000 MCG: 500 TAB at 04:54

## 2020-11-10 RX ADMIN — ARIPIPRAZOLE 30 MG: 10 TABLET ORAL at 04:54

## 2020-11-10 RX ADMIN — METHYLPREDNISOLONE SODIUM SUCCINATE 62.5 MG: 125 INJECTION, POWDER, FOR SOLUTION INTRAMUSCULAR; INTRAVENOUS at 04:55

## 2020-11-10 ASSESSMENT — PAIN DESCRIPTION - PAIN TYPE: TYPE: ACUTE PAIN

## 2020-11-10 NOTE — CARE PLAN
Problem: Respiratory:  Goal: Respiratory status will improve  Intervention: Assess and monitor pulmonary status  Note: Wearing  and assessing O2 levels. Encourage use of CPAP     Problem: Psychosocial Needs:  Goal: Level of anxiety will decrease  Intervention: Identify and develop with patient strategies to cope with anxiety triggers  Note: Trying deep breathing technique for anxiety pt is feeling and asking what has caused her to be anxious

## 2020-11-10 NOTE — RESPIRATORY CARE
COPD navigator note:  Patient scheduled for [facility] on 11/16/2020 at 915 with Jessica Whitfield. Reminder given to patient.

## 2020-11-10 NOTE — DISCHARGE INSTRUCTIONS
Discharge Instructions    Discharged to home by car with relative. Discharged via wheelchair, hospital escort: Yes.  Special equipment needed: Not Applicable    Be sure to schedule a follow-up appointment with your primary care doctor or any specialists as instructed.     Discharge Plan:   Diet Plan: Discussed  Activity Level: Discussed  Confirmed Follow up Appointment: Appointment Scheduled  Confirmed Symptoms Management: Discussed  Medication Reconciliation Updated: Yes  Influenza Vaccine Indication: Not indicated: Previously immunized this influenza season and > 8 years of age    I understand that a diet low in cholesterol, fat, and sodium is recommended for good health. Unless I have been given specific instructions below for another diet, I accept this instruction as my diet prescription.   Other diet: Regular    Special Instructions: None    · Is patient discharged on Warfarin / Coumadin?   No     Depression / Suicide Risk    As you are discharged from this [facility] facility, it is important to learn how to keep safe from harming yourself.    Recognize the warning signs:  · Abrupt changes in personality, positive or negative- including increase in energy   · Giving away possessions  · Change in eating patterns- significant weight changes-  positive or negative  · Change in sleeping patterns- unable to sleep or sleeping all the time   · Unwillingness or inability to communicate  · Depression  · Unusual sadness, discouragement and loneliness  · Talk of wanting to die  · Neglect of personal appearance   · Rebelliousness- reckless behavior  · Withdrawal from people/activities they love  · Confusion- inability to concentrate     If you or a loved one observes any of these behaviors or has concerns about self-harm, here's what you can do:  · Talk about it- your feelings and reasons for harming yourself  · Remove any means that you might use to hurt yourself (examples: pills, rope, extension cords,  firearm)  · Get professional help from the community (Mental Health, Substance Abuse, psychological counseling)  · Do not be alone:Call your Safe Contact- someone whom you trust who will be there for you.  · Call your local CRISIS HOTLINE 720-4523 or 391-758-3329  · Call your local Children's Mobile Crisis Response Team [place] (479) 812-8932 or www.[employer]  · Call the toll free National Suicide Prevention Hotlines   · National Suicide Prevention Lifeline 062-794-BUZP (7680)  · National Hope Line Network 800-SUICIDE (846-2902)

## 2020-11-10 NOTE — PROGRESS NOTES
Received report from morning RN. Assumed pt care at 1900. Pt complains of nausea and rash under left breast. Moisture present under both breast and groin. Bed is locked lowered and call light is in reach.

## 2020-11-10 NOTE — PROGRESS NOTES
Bedside report received from night RN. Assumed care of pt at 0645 . Pt is asleep at this time with equal chest rise and no signs of distress. Plan of care discussed with pt. Pt is A&O x 4. Pt is on room air. Bed alarm is not in use, bed in lowest position, bed rails up x 2, belongings and call light within reach. Hourly rounding in place.

## 2020-11-10 NOTE — PROGRESS NOTES
[facility] Medicine Daily Progress Note    Date of Service  11/9/2020    Chief Complaint  45 y.o. female admitted 11/8/2020 with cough    Hospital Course  45-year-old female with a history of morbid obesity, COPD, bipolar disorder, schizophrenia who presented with cough and shortness of breath x1 day.  She reports her roommate has also been sick.  CT chest was done which was negative for PE.  EKG without acute ST/T wave changes, troponin 29-->16-->15.  Covid/flu negative.  She was admitted and treated for acute COPD exacerbation and pneumonia.      Interval Problem Update  - admitted yesterday, started on azithromycin and CTX, weaned to RA but then desat this afternoon, procal negative  - c/o sore throat and hoarseness but overall feels a little better than yesterday  - afebrile  - had some n/v today     Consultants/Specialty  None    Code Status  Full Code    Disposition  TBD likely home when medically clear    Review of Systems  Review of Systems   Constitutional: Positive for malaise/fatigue. Negative for fever.   HENT: Positive for congestion and sore throat.    Eyes: Negative for discharge and redness.   Respiratory: Positive for cough and shortness of breath.    Cardiovascular: Negative for chest pain and leg swelling.   Gastrointestinal: Positive for nausea and vomiting.   Genitourinary: Negative for hematuria.   Musculoskeletal: Negative for falls.   Skin: Negative for rash.   Neurological: Negative for seizures and loss of consciousness.        Physical Exam  Temp:  [36.6 °C (97.8 °F)-37.1 °C (98.8 °F)] 36.8 °C (98.2 °F)  Pulse:  [70-84] 75  Resp:  [19-28] 19  BP: (115-141)/(31-93) 115/58  SpO2:  [84 %-98 %] 84 %    Physical Exam  Vitals signs and nursing note reviewed.   Constitutional:       General: She is not in acute distress.     Appearance: She is obese. She is not toxic-appearing or diaphoretic.   HENT:      Head: Normocephalic and atraumatic.      Nose: Nose normal.      Mouth/Throat:      Mouth:  Mucous membranes are moist.      Pharynx: Oropharynx is clear.   Eyes:      General: No scleral icterus.        Right eye: No discharge.         Left eye: No discharge.      Conjunctiva/sclera: Conjunctivae normal.   Neck:      Musculoskeletal: Normal range of motion.   Cardiovascular:      Rate and Rhythm: Normal rate and regular rhythm.      Heart sounds: No murmur. No friction rub. No gallop.    Pulmonary:      Effort: Pulmonary effort is normal.      Breath sounds: No wheezing, rhonchi or rales.   Abdominal:      General: Bowel sounds are normal. There is no distension.      Palpations: Abdomen is soft.      Tenderness: There is no abdominal tenderness.   Musculoskeletal:      Right lower leg: No edema.      Left lower leg: No edema.   Skin:     General: Skin is warm and dry.      Coloration: Skin is not jaundiced.      Findings: No rash.   Neurological:      Mental Status: She is alert and oriented to person, place, and time.   Psychiatric:         Mood and Affect: Mood normal.         Behavior: Behavior normal.         Fluids    Intake/Output Summary (Last 24 hours) at 11/9/2020 1116  Last data filed at 11/9/2020 1300  Gross per 24 hour   Intake 600 ml   Output --   Net 600 ml       Laboratory  Recent Labs     11/08/20  1050 11/09/20  0659   WBC 15.7* 14.1*   RBC 4.49 4.41   HEMOGLOBIN 12.7 12.7   HEMATOCRIT 43.3 42.6   MCV 96.4 96.6   MCH 28.3 28.8   MCHC 29.3* 29.8*   RDW 56.8* 56.5*   PLATELETCT 313 285   MPV 11.2 11.3     Recent Labs     11/08/20  1050 11/09/20  0659   SODIUM 140 136   POTASSIUM 4.3 4.6   CHLORIDE 102 101   CO2 29 24   GLUCOSE 89 242*   BUN 10 10   CREATININE 0.90 0.97   CALCIUM 9.1 9.1                   Imaging  CT-CTA CHEST PULMONARY ARTERY W/ RECONS   Final Result      No central or segmental pulmonary embolus is identified.      Minimal lingula and anterior left lower lobe atelectasis.                  DX-CHEST-PORTABLE (1 VIEW)   Final Result      Mild cardiomegaly.            Assessment/Plan  Acute on chronic respiratory failure with hypoxia (HCC)- (present on admission)  Assessment & Plan  Likely secondary to the early developing pneumonia vs viral pneumonia (covid/flu negative) and restrictive lung disease from morbid obesity  Continue with oxygen via nasal cannula        KATHIA and COPD overlap syndrome (HCC)- (present on admission)  Assessment & Plan  Continue with CPAP nightly nightly    Cardiomegaly- (present on admission)  Assessment & Plan  Continue with Lasix 20 mg p.o.    Hx of  Bipolar affective disorder, and schizophrenia- (present on admission)  Assessment & Plan  Continue with lithium 300 mg p.o. once daily  Continue with aripiprazole  Follow-up lithium levels    COPD (chronic obstructive pulmonary disease) (HCC)- (present on admission)  Assessment & Plan  Continue with Solu-Medrol 125 mg every 8 hourly  Continue with Rocephin and azithromycin  Continue with DuoNeb inhalation q. fourth hourly and Pulmicort         VTE prophylaxis: Lovenox

## 2020-11-10 NOTE — HOSPITAL COURSE
45-year-old female with a history of morbid obesity, COPD, bipolar disorder, schizophrenia who presented with cough and shortness of breath x1 day.  She reports her roommate has also been sick.  CT chest was done which was negative for PE.  EKG without acute ST/T wave changes, troponin 29-->16-->15.  Covid/flu negative.  She was admitted and treated for acute COPD exacerbation and pneumonia.

## 2020-11-10 NOTE — DISCHARGE SUMMARY
Discharge Summary    CHIEF COMPLAINT ON ADMISSION  Chief Complaint   Patient presents with   • Cough     x1 day, RUQ pain secondary to cough       Reason for Admission  EMS     Admission Date  11/8/2020    CODE STATUS  Full Code    HPI & HOSPITAL COURSE  45 y.o. female admitted 11/8/2020 with 1 day history of shortness of breath and cough.  She is morbidly obese, and has cardiomegaly, COPD, bipolar disorder, and schizophrenia.     Chest x-ray from CTA chest was negative for pulmonary emboli.  COVID-19/influenza A&B were negative.  Troponin was slightly elevated 29 -> 16 -> 15. EKG showed no acute ST/T wave changes.   She has been on telemetry without any events.     COPD/acute on chronic respiratory failure with hypoxia  Patient is currently saturating 94% on room air.  No evidence of pneumonia on imaging.  She states she feels well and is eager to go home.  Leukocytosis is likely due to receiving Solu-Medrol.  Patient has been advised to follow-up with her primary care physician in the next 1 to 2 days.     Bipolar disorder/schizophrenia:  Continue home lithium and aripiprazole.  Follow-up with outpatient primary care physician    Cardiomegaly  Continue home Lasix    KATHIA  Continue home CPAP      Therefore, she is discharged in fair and stable condition to home with close outpatient follow-up.    Discharge Date  11/10/2020    FOLLOW UP ITEMS POST DISCHARGE  PCP in next few days    DISCHARGE DIAGNOSES  Active Problems:    KATHIA and COPD overlap syndrome (HCC) POA: Yes    Cardiomegaly POA: Yes    COPD (chronic obstructive pulmonary disease) (HCC) POA: Yes    Hx of  Bipolar affective disorder, and schizophrenia POA: Yes  Resolved Problems:    Acute on chronic respiratory failure with hypoxia (HCC) POA: Yes      FOLLOW UP  Future Appointments   Date Time Provider Department Center   11/16/2020  9:15 AM Jessica Whitfield P.A.-C. [facility] None   3/17/2021  1:00 PM Isa Meraz M.D. [facility] None     [facility]ason  [address] [place] [address]  [place] [address]  [phone]    Go on 11/12/2020  Please attend your hospital follow up at 2:30pm with Primary Care. Thank you!       MEDICATIONS ON DISCHARGE     Medication List      CHANGE how you take these medications      Instructions   albuterol 108 (90 Base) MCG/ACT Aers inhalation aerosol  What changed: Another medication with the same name was removed. Continue taking this medication, and follow the directions you see here.   Inhale 2 Puffs by mouth every 6 hours as needed for Shortness of Breath.  Dose: 2 Puff     atorvastatin 20 MG Tabs  What changed: when to take this  Commonly known as: LIPITOR   Take 1 Tab by mouth every day.  Dose: 20 mg     furosemide 20 MG Tabs  What changed: Another medication with the same name was removed. Continue taking this medication, and follow the directions you see here.  Commonly known as: LASIX   Take 1 Tab by mouth every day.  Dose: 20 mg     montelukast 10 MG Tabs  What changed: Another medication with the same name was removed. Continue taking this medication, and follow the directions you see here.  Commonly known as: SINGULAIR   Take 10 mg by mouth every bedtime.  Dose: 10 mg     Spiriva HandiHaler 18 MCG Caps  What changed: Another medication with the same name was removed. Continue taking this medication, and follow the directions you see here.  Generic drug: tiotropium   Inhale 1 Cap by mouth every day.  Dose: 18 mcg        CONTINUE taking these medications      Instructions   Abilify 30 MG tablet  Generic drug: aripiprazole   Take 30 mg by mouth every morning.  Dose: 30 mg     acetaminophen 325 MG Tabs  Commonly known as: Tylenol   Take 2 Tabs by mouth every 6 hours as needed (Mild Pain; (Pain scale 1-3); Temp greater than 100.5 F).  Dose: 650 mg     benztropine 1 MG Tabs  Commonly known as: COGENTIN   Take 1 mg by mouth every bedtime.  Dose: 1 mg     haloperidol 5 MG Tabs  Commonly known as: HALDOL   Take 5 mg by mouth every bedtime.  Dose: 5 mg      ipratropium-albuterol 0.5-2.5 (3) MG/3ML nebulizer solution  Commonly known as: DUONEB   3 mL by Nebulization route every 6 hours as needed for Shortness of Breath.  Dose: 3 mL     lithium  MG Tbcr  Commonly known as: LITHOBID   Take 300-600 mg by mouth 2 Times a Day. 300 mg in AM and 600 mg at bedtime  Dose: 300-600 mg            Allergies  Allergies   Allergen Reactions   • Amoxicillin Rash     Tolerates cephalosporins   • Penicillin G Itching       DIET  Orders Placed This Encounter   Procedures   • Diet Order Diet: Regular     Standing Status:   Standing     Number of Occurrences:   1     Order Specific Question:   Diet:     Answer:   Regular [1]       ACTIVITY  As tolerated.    CONSULTATIONS  N/A    PROCEDURES  None    LABORATORY  Lab Results   Component Value Date    SODIUM 136 11/09/2020    POTASSIUM 4.6 11/09/2020    CHLORIDE 101 11/09/2020    CO2 24 11/09/2020    GLUCOSE 242 (H) 11/09/2020    BUN 10 11/09/2020    CREATININE 0.97 11/09/2020    CREATININE 0.9 09/11/2008        Lab Results   Component Value Date    WBC 16.8 (H) 11/10/2020    HEMOGLOBIN 11.9 (L) 11/10/2020    HEMATOCRIT 39.6 11/10/2020    PLATELETCT 292 11/10/2020        Total time of the discharge process exceeds 40 minutes.

## 2020-11-11 ENCOUNTER — PATIENT OUTREACH (OUTPATIENT)
Dept: HEALTH INFORMATION MANAGEMENT | Facility: OTHER | Age: 45
End: 2020-11-11

## 2020-11-11 NOTE — PROGRESS NOTES
Community Health Worker Intake  • Social determinates of health intake Complete.   • Identified barriers to food, transportation.  • Contact information provided to Lorene Nicole Tahir   • Has PCP appointment scheduled for Nov 13  • Scheduled Food Delivery  • Outpatient assessment completed.    CHW Nelia received a call from pt concerning her prescriptions. CHW referred pt to Pharmacist who will contact pt later today. CHW informed pt that a food delivery was coming to her. CHW informed pt of transportation resources coming in the mail and how to access them. Pt identifies no other barriers at this time. Pt expressed no other needs at this time.     Plan: CHW will follow up with pt next week to confirm needs are met.

## 2020-11-11 NOTE — PROGRESS NOTES
Received referral from [facility] regarding patient medication questions. Outbound call to Lorene. Left VM with my contact information and request for return call.     Received return call from Lorene. Patient states she was released from [facility] recently but [facility] did not have an order for the furosemide. Per chart review, no new orders were sent to patient's pharmacy. Patient reports having a follow-up appointment with PCP on 11/13. Encouraged patient to reach out to PCP to see if refill can be sent to pharmacy. Patient agreeable. Patient does report continuing dry cough, she confirms she has both her duoneb nebulizer and albuterol inhaler. She plans to use try the albuterol inhaler and will follow-up with PCP. Denies further questions/ concerns at this time.     Juanita Spann, PharmD

## 2020-11-12 ENCOUNTER — TELEPHONE (OUTPATIENT)
Dept: SLEEP MEDICINE | Facility: MEDICAL CENTER | Age: 45
End: 2020-11-12

## 2020-11-13 LAB
BACTERIA BLD CULT: NORMAL
SIGNIFICANT IND 70042: NORMAL
SITE SITE: NORMAL
SOURCE SOURCE: NORMAL

## 2020-11-16 ENCOUNTER — OFFICE VISIT (OUTPATIENT)
Dept: SLEEP MEDICINE | Facility: MEDICAL CENTER | Age: 45
End: 2020-11-16
Payer: MEDICAID

## 2020-11-16 ENCOUNTER — PATIENT OUTREACH (OUTPATIENT)
Dept: HEALTH INFORMATION MANAGEMENT | Facility: OTHER | Age: 45
End: 2020-11-16

## 2020-11-16 VITALS
OXYGEN SATURATION: 97 % | WEIGHT: 282 LBS | DIASTOLIC BLOOD PRESSURE: 78 MMHG | BODY MASS INDEX: 53.24 KG/M2 | HEIGHT: 61 IN | HEART RATE: 80 BPM | RESPIRATION RATE: 14 BRPM | SYSTOLIC BLOOD PRESSURE: 126 MMHG

## 2020-11-16 DIAGNOSIS — J44.1 COPD WITH ACUTE EXACERBATION (HCC): ICD-10-CM

## 2020-11-16 DIAGNOSIS — R06.2 WHEEZE: ICD-10-CM

## 2020-11-16 DIAGNOSIS — R05.9 COUGH: ICD-10-CM

## 2020-11-16 DIAGNOSIS — R06.02 SHORTNESS OF BREATH: ICD-10-CM

## 2020-11-16 PROCEDURE — 94761 N-INVAS EAR/PLS OXIMETRY MLT: CPT | Performed by: PHYSICIAN ASSISTANT

## 2020-11-16 PROCEDURE — 99214 OFFICE O/P EST MOD 30 MIN: CPT | Performed by: PHYSICIAN ASSISTANT

## 2020-11-16 RX ORDER — ALBUTEROL SULFATE 90 UG/1
2 AEROSOL, METERED RESPIRATORY (INHALATION) EVERY 6 HOURS PRN
Qty: 8.5 G | Refills: 2 | Status: SHIPPED | OUTPATIENT
Start: 2020-11-16 | End: 2021-03-04

## 2020-11-16 RX ORDER — MONTELUKAST SODIUM 10 MG/1
10 TABLET ORAL
Qty: 30 TAB | Refills: 11 | Status: SHIPPED | OUTPATIENT
Start: 2020-11-16 | End: 2021-12-16 | Stop reason: SDUPTHER

## 2020-11-16 ASSESSMENT — FIBROSIS 4 INDEX: FIB4 SCORE: 0.34

## 2020-11-16 ASSESSMENT — ENCOUNTER SYMPTOMS
COUGH: 0
HEARTBURN: 0
ROS GI COMMENTS: NO DENTURES, NO DIFFICULTY SWALLOWING
HEADACHES: 0
SORE THROAT: 0
WEIGHT LOSS: 0
FEVER: 0
WHEEZING: 0
DIZZINESS: 0
SPUTUM PRODUCTION: 0
PALPITATIONS: 0
ORTHOPNEA: 0
INSOMNIA: 0
SINUS PAIN: 0
SHORTNESS OF BREATH: 1
TREMORS: 1
CHILLS: 0

## 2020-11-16 NOTE — PROCEDURES
Multi-Ox Readings  Multi Ox #1 Room air   O2 sat % at rest 97   O2 sat % on exertion 98   O2 sat average on exertion     Multi Ox #2     O2 sat % at rest     O2 sat % on exertion     O2 sat average on exertion       Oxygen Use 3   Oxygen Frequency Nocturnal   Duration of need     Is the patient mobile within the home?     CPAP Use?     BIPAP Use?     Servo Titration

## 2020-11-16 NOTE — PROGRESS NOTES
CC: Shortness of breath    HPI:  Lorene Haro is a 45 y.o. year old female here today for follow-up on recent hospital admission on 11/8/2020 20 x 2 days for shortness of breath and cough.  Patient with multiple emergency room visits.  She was seen last by Dr. Meraz for KATHIA.    Past medical history includes KATHIA and COPD overlap syndrome, chronic hypoxic respiratory failure, cardiomegaly, bipolar affective disorder, schizophrenia, morbid obesity.  She does have panic attacks with associated dyspnea as well.    Reviewed in clinic vitals including blood pressure 126/78, heart rate of 80, O2 sat of 97% on room air.  Ambulating multi ox was completed patient maintained oxygen saturation at 97 to 99% on room air.    Reviewed home medication regimen including montelukast, albuterol, Spiriva, DuoNeb.  Reports using nebulizer 3-4 times per week.    Reviewed most recent imaging eluding CTA obtained 11/8/2020 demonstrating no PEs, mild lingular and anterior left lower lobe atelectasis, degenerative changes in the spine, atherosclerotic plaque seen in the aorta, trace pericardial fluid, right paratracheal lymph node measuring 8 mm, small hilar lymph nodes, no axillary lymphadenopathy.    Chest x-ray obtained 11/8/2020 demonstrated mildly enlarged heart, no acute pulmonary changes.    Patient had echo obtained 11/18/2019 demonstrating normal left ventricular chamber size and systolic function, mild concentric left ventricular hypertrophy, LVEF estimated at 60%, normal diastolic function.  Mildly dilated right ventricle, enlarged right atrium, dilated inferior vena cava without inspiratory collapse, mild mitral regurg, trace aortic insufficiency, moderate tricuspid regurgitation with RVSP estimated at 65 mmHg.    Patient has been difficult to manage for her KATHIA.  She failed both CPAP and BiPAP titration, she was placed on AVAPS with significant improvement in fatigue and sleepiness per Dr. Meraz's office note  8/28/20.    Pulmonary function testing obtained 5/21/2020 demonstrated FEV1 of 1.84 L or 70% predicted, FVC of 2.07 L or 63% predicted, FEV1/FVC ratio of 89, total lung capacity 80% predicted, DLCO 113% predicted.  No significant bronchodilator response.  Per pulmonologist interpretation pseudorestriction secondary to BMI of 51.  Normal gas transfer.    Review of Systems   Constitutional: Negative for chills, fever, malaise/fatigue and weight loss.   HENT: Negative for congestion, hearing loss, nosebleeds, sinus pain, sore throat and tinnitus.    Respiratory: Positive for shortness of breath. Negative for cough, sputum production and wheezing.    Cardiovascular: Positive for leg swelling. Negative for chest pain, palpitations and orthopnea.   Gastrointestinal: Negative for heartburn.        No dentures, no difficulty swallowing     Neurological: Positive for tremors (occasionally ). Negative for dizziness and headaches.   Psychiatric/Behavioral: The patient does not have insomnia.        Past Medical History:   Diagnosis Date   • Asthma    • Bipolar 2 disorder (HCC)    • Chickenpox    • Other psychological stress    • Schizophrenic disorder (HCC)        Past Surgical History:   Procedure Laterality Date   • CHOLECYSTECTOMY     • COLECTOMY     • HYSTERECTOMY LAPAROSCOPY     • KNEE ARTHROSCOPY         Family History   Problem Relation Age of Onset   • No Known Problems Mother    • Cancer Sister        Social History     Socioeconomic History   • Marital status: Single     Spouse name: Not on file   • Number of children: Not on file   • Years of education: Not on file   • Highest education level: Not on file   Occupational History   • Not on file   Social Needs   • Financial resource strain: Hard   • Food insecurity     Worry: Sometimes true     Inability: Often true   • Transportation needs     Medical: Yes     Non-medical: Yes   Tobacco Use   • Smoking status: Former Smoker     Packs/day: 1.00     Years: 20.00      "Pack years: 20.00     Types: Cigarettes     Quit date: 2018     Years since quittin.5   • Smokeless tobacco: Never Used   • Tobacco comment: since , trying to quit   Substance and Sexual Activity   • Alcohol use: No   • Drug use: No   • Sexual activity: Not on file   Lifestyle   • Physical activity     Days per week: Not on file     Minutes per session: Not on file   • Stress: Not on file   Relationships   • Social connections     Talks on phone: Not on file     Gets together: Not on file     Attends [employer] service: Not on file     Active member of club or organization: Not on file     Attends meetings of clubs or organizations: Not on file     Relationship status: Not on file   • Intimate partner violence     Fear of current or ex partner: Not on file     Emotionally abused: Not on file     Physically abused: Not on file     Forced sexual activity: Not on file   Other Topics Concern   • Not on file   Social History Narrative    From Ramesh       Allergies as of 2020 - Reviewed 2020   Allergen Reaction Noted   • Amoxicillin Rash 2007   • Penicillin g Itching 10/20/2020        @Vital signs for this encounter:  Vitals:    20 0920   Height: 1.549 m (5' 1\")   Weight: (!) 127.9 kg (282 lb)   Weight % change since last entry.: 0 %   BP: 126/78   Pulse: 80   BMI (Calculated): 53.28   Resp: 14       Current medications as of today   Current Outpatient Medications   Medication Sig Dispense Refill   • atorvastatin (LIPITOR) 20 MG Tab Take 1 Tab by mouth every day. (Patient taking differently: Take 20 mg by mouth every bedtime.) 30 Tab 1   • tiotropium (SPIRIVA HANDIHALER) 18 MCG Cap Inhale 1 Cap by mouth every day. 30 Cap 3   • ipratropium-albuterol (DUONEB) 0.5-2.5 (3) MG/3ML nebulizer solution 3 mL by Nebulization route every 6 hours as needed for Shortness of Breath. 60 Each 1   • acetaminophen (TYLENOL) 325 MG Tab Take 2 Tabs by mouth every 6 hours as needed (Mild Pain; (Pain scale " 1-3); Temp greater than 100.5 F). 30 Tab 0   • furosemide (LASIX) 20 MG Tab Take 1 Tab by mouth every day. 30 Tab 0   • benztropine (COGENTIN) 1 MG Tab Take 1 mg by mouth every bedtime.     • haloperidol (HALDOL) 5 MG Tab Take 5 mg by mouth every bedtime.     • montelukast (SINGULAIR) 10 MG Tab Take 10 mg by mouth every bedtime.     • lithium CR (LITHOBID) 300 MG Tab CR Take 300-600 mg by mouth 2 Times a Day. 300 mg in AM and 600 mg at bedtime     • albuterol 108 (90 Base) MCG/ACT Aero Soln inhalation aerosol Inhale 2 Puffs by mouth every 6 hours as needed for Shortness of Breath. 8.5 g 2   • aripiprazole (ABILIFY) 30 MG tablet Take 30 mg by mouth every morning.       No current facility-administered medications for this visit.          Physical Exam:   Gen:           Alert and oriented, No apparent distress. Mood and affect appropriate, normal interaction with provider.  Eyes:          sclere white, conjunctive moist.  Hearing:     Grossly intact.  Dentition:    Fair dentition.  Oropharynx:   Tongue normal, posterior pharynx without erythema or exudate.  Neck:        Supple, trachea midline, no masses.  Respiratory Effort: No intercostal retractions or use of accessory muscles.   Lung Auscultation:      Diminished; no rales, rhonchi or wheezing.  CV:            Regular rate and rhythm.  Trace lower extremity edema. No murmurs, rubs or gallops.  Digits, Nails, Ext: No clubbing, cyanosis, petechiae, or nodes.   Skin:        No rashes, lesions or ulcers noted on exposed skin surfaces.                     Assessment:  1. COPD with acute exacerbation (HCC)  albuterol 108 (90 Base) MCG/ACT Aero Soln inhalation aerosol   2. Shortness of breath  Multiple Oximetry    Multiple Oximetry   3. Wheeze  montelukast (SINGULAIR) 10 MG Tab    albuterol 108 (90 Base) MCG/ACT Aero Soln inhalation aerosol   4. Cough  albuterol 108 (90 Base) MCG/ACT Aero Soln inhalation aerosol       Immunizations:    Flu: 9/28/20  Pneumovax 23:  11/20/18  Prevnar 13: deferred     Plan:  45 y.o. year old female here today for follow-up on recent hospital admission on 11/8/2020 20 x 2 days for shortness of breath and cough.  Patient with multiple emergency room visits.  She was seen last by Dr. Meraz for KATHIA.    Past medical history includes KATHIA and COPD overlap syndrome, chronic hypoxic respiratory failure, cardiomegaly, bipolar affective disorder, schizophrenia, morbid obesity.  She does have panic attacks with associated dyspnea as well.    Reviewed in clinic vitals including blood pressure 126/78, heart rate of 80, O2 sat of 97% on room air.  Ambulating multi ox was completed patient maintained oxygen saturation at 97 to 99% on room air.    Elevated BMI: Discussed increasing activity as tolerated, impact of central adiposity on pulmonary function.    Reviewed home medication regimen including montelukast, albuterol, Spiriva, DuoNeb.  Reports using nebulizer 3-4 times per week.    COPD: Remains on steroids post hospitalization. Met with nurse navigator to review COPD action plan. Continue same regimen.      Shortness of breath/cough: Reviewed symptoms to use inhaler or nebulizer including shortness of breath, wheezing or coughing, feeling winded.  Reviewed timing between nebulizer or inhaler use at least 4 hours.  Patient did not qualify for daytime oxygen.    Reviewed most recent imaging eluding CTA obtained 11/8/2020 demonstrating no PEs, mild lingular and anterior left lower lobe atelectasis, degenerative changes in the spine, atherosclerotic plaque seen in the aorta, trace pericardial fluid, right paratracheal lymph node measuring 8 mm, small hilar lymph nodes, no axillary lymphadenopathy.    Chest x-ray obtained 11/8/2020 demonstrated mildly enlarged heart, no acute pulmonary changes.    Patient had echo obtained 11/18/2019 demonstrating normal left ventricular chamber size and systolic function, mild concentric left ventricular hypertrophy, LVEF  estimated at 60%, normal diastolic function.  Mildly dilated right ventricle, enlarged right atrium, dilated inferior vena cava without inspiratory collapse, mild mitral regurg, trace aortic insufficiency, moderate tricuspid regurgitation with RVSP estimated at 65 mmHg.    Patient has been difficult to manage for her KATHIA.  She failed both CPAP and BiPAP titration, she was placed on AVAPS with significant improvement in fatigue and sleepiness per Dr. Meraz's office note 8/28/20.    KATHIA: Patient states continued use and benefit.  Last seen in August.  Alternate KATHIA and pulmonary appointments.  Has follow-up visit in March with Dr. Meraz.  Does require nighttime oxygen.     Pulmonary function testing obtained 5/21/2020 demonstrated FEV1 of 1.84 L or 70% predicted, FVC of 2.07 L or 63% predicted, FEV1/FVC ratio of 89, total lung capacity 80% predicted, DLCO 113% predicted.  No significant bronchodilator response.  Per pulmonologist interpretation pseudorestriction secondary to BMI of 51.  Normal gas transfer.    Reviewed COVID-19 precautions including wearing mask in public, frequent handwashing, social distancing, has had flu shot.  Reports tested multiple times and negative.    Patient requested [facility] phone number and location[facility] [address] [facility], [phone].  Needs backup tanks in case of power outage.  No daytime oxygen.     Recommend follow-up in 6 months, sooner if needed.      This dictation was created using voice recognition software. The accuracy of the dictation is limited to the abilities of the software. I expect there may be some errors of grammar and possibly content.

## 2020-11-16 NOTE — PATIENT INSTRUCTIONS
1-phone number for [facility] is #[phone]   No daytime oxygen needed, has concentrator needs back  up tanks in case of power outage  2-review covid 19 precautions:  Wear mask in public, frequent handwashing, social distancing, has had flu shot  3-reviewed symptoms to use inhaler or nebulizer, shortness of breathing, wheezing or coughing, feeling winded  4-can use nebulizer or inhaler again after 4 hours  5-follow up here in 6 months, sooner if needed   6-met with J[facility] Shah navigator

## 2020-11-16 NOTE — PROGRESS NOTES
CHW received voicemail from pt stating she has not received bus passes yet. CHW left voice mail 11/16 stating, CHW will re send eufemia passes and provided number for [facility]. CHW also informed pt to call again if she has any needs.     Plan: CHW will mail resources to pt.

## 2020-11-16 NOTE — PROGRESS NOTES
"COPD education provided to patient in clinic.  Action Plan reviewed.  Q&A session provided. Instructed patient to call Pulmonary Nurse Navigator with any concerns.    MY COPD ACTION PLAN     It is recommended that patients and physicians /healthcare providers complete this action plan together. This plan should be discussed at each physician visit and updated as needed.    The green, yellow and red zones show groups of symptoms of COPD. This list of symptoms is not comprehensive, and you may experience other symptoms. In the \"Actions\" column, your healthcare provider has recommended actions for you to take based on your symptoms.    Patient Name: Lorene Haro   YOB: 1975   Last Updated on:     Green Zone:  I am doing well today Actions   •  Usual activitiy and exercise level •  Take daily medications   •  Usual amounts of cough and phlegm/mucus •  Use oxygen as prescribed   •  Sleep well at night •  Continue regular exercise/diet plan   •  Appetite is good •  At all times avoid cigarette smoke, inhaled irritants     Daily Medications (these medications are taken every day):   Tiotropium Bromide Monohydrate (Spiriva)  Singulair 2 Puffs  1 Tablet Once Daily  Once Daily        Yellow Zone:  I am having a bad day or a COPD flare Actions   •  More breathless than usual •  Continue daily medications   •  I have less energy for my daily activities •  Use quick relief inhaler as ordered   •  Increased or thicker phlegm/mucus •  Use oxygen as prescribed   •  Using quick relief inhaler/nebulizer more often •  Get plenty of rest   •  Swelling of ankles more than usual •  Use pursed lip breathing   •  More coughing than usual •  At all times avoid cigarette smoke, inhaled irritants   •  I feel like I have a \"chest cold\"   •  Poor sleep and my symptoms woke me up   •  My appetite is not good   •  My medicine is not helping    •  Call provider immediately if symptoms don’t improve     Continue daily " medications, add rescue medications:   Albuterol/Ipratropium (Combivent, Duoneb)  Albuterol 3mL via nebulizer  2 Puffs Every 4-6 Hours as needed  Every 4-6 Hours as needed     Additional Information:  Follow your Doctors recommendations  (Last order for treatments every 6 hrs as needed)    Red Zone:  I need urgent medical care Actions   •  Severe shortness of breath even at rest •  Call 911 or seek medical care immediately   •  Not able to do any activity because of breathing    •  Fever or shaking chills    •  Feeling confused or very drowsy     •  Chest pains    •  Coughing up blood

## 2020-11-24 ENCOUNTER — PATIENT OUTREACH (OUTPATIENT)
Dept: HEALTH INFORMATION MANAGEMENT | Facility: OTHER | Age: 45
End: 2020-11-24

## 2020-11-24 NOTE — PROGRESS NOTES
CHW called pt to confirm pt needs are met. CHW left voicemail explaining pt can call anytime she has needs in the future.     Plan: CHW will d/c pt from CCM team; met all goals and loss of contact

## 2020-11-28 PROCEDURE — 99283 EMERGENCY DEPT VISIT LOW MDM: CPT | Mod: EDC

## 2020-11-28 ASSESSMENT — FIBROSIS 4 INDEX: FIB4 SCORE: 0.34

## 2020-11-29 ENCOUNTER — HOSPITAL ENCOUNTER (EMERGENCY)
Facility: MEDICAL CENTER | Age: 45
End: 2020-11-29
Attending: EMERGENCY MEDICINE
Payer: MEDICAID

## 2020-11-29 ENCOUNTER — APPOINTMENT (OUTPATIENT)
Dept: RADIOLOGY | Facility: MEDICAL CENTER | Age: 45
End: 2020-11-29
Attending: EMERGENCY MEDICINE
Payer: MEDICAID

## 2020-11-29 VITALS
OXYGEN SATURATION: 100 % | HEIGHT: 62 IN | DIASTOLIC BLOOD PRESSURE: 60 MMHG | BODY MASS INDEX: 52.09 KG/M2 | SYSTOLIC BLOOD PRESSURE: 120 MMHG | HEART RATE: 68 BPM | WEIGHT: 283.07 LBS | TEMPERATURE: 98.5 F | RESPIRATION RATE: 18 BRPM

## 2020-11-29 DIAGNOSIS — M79.644 PAIN OF RIGHT THUMB: ICD-10-CM

## 2020-11-29 LAB — EKG IMPRESSION: NORMAL

## 2020-11-29 PROCEDURE — A9270 NON-COVERED ITEM OR SERVICE: HCPCS | Mod: EDC | Performed by: EMERGENCY MEDICINE

## 2020-11-29 PROCEDURE — 700102 HCHG RX REV CODE 250 W/ 637 OVERRIDE(OP): Mod: EDC | Performed by: EMERGENCY MEDICINE

## 2020-11-29 PROCEDURE — 73130 X-RAY EXAM OF HAND: CPT | Mod: RT

## 2020-11-29 PROCEDURE — 93005 ELECTROCARDIOGRAM TRACING: CPT | Mod: EDC | Performed by: EMERGENCY MEDICINE

## 2020-11-29 RX ORDER — ACETAMINOPHEN 325 MG/1
650 TABLET ORAL ONCE
Status: COMPLETED | OUTPATIENT
Start: 2020-11-29 | End: 2020-11-29

## 2020-11-29 RX ADMIN — ACETAMINOPHEN 650 MG: 325 TABLET, FILM COATED ORAL at 01:17

## 2020-11-29 NOTE — ED PROVIDER NOTES
ED Provider Note    CHIEF COMPLAINT  Right thumb pain    HPI  Lorene Haro is a 45 y.o. female who presents to the emergency department for evaluation of right thumb pain.  Patient states that she was at the gas station today when she slammed the door shut and hit her right thumb on the door jam.  She states that she had the sudden onset of pain at the base of the thumb that now radiates up her arm.  She states that pain is made worse with movement of the thumb.  She is right-hand dominant.  She denies any numbness.  She denies any chest pain or shortness of breath.  She denies any other injuries.  She is otherwise been well with no recent fevers, coughing, wheezing, congestion, runny nose, sore throat, difficulty breathing, nausea, vomiting, or abdominal pain.  She has not been around anyone with COVID-19 that she is aware of.    REVIEW OF SYSTEMS  See HPI for further details. All other systems are negative.     PAST MEDICAL HISTORY   has a past medical history of Asthma, Bipolar 2 disorder ([facility]), Chickenpox, Other psychological stress, and Schizophrenic disorder ([facility]).    SOCIAL HISTORY  Social History     Tobacco Use   • Smoking status: Former Smoker     Packs/day: 1.00     Years: 20.00     Pack years: 20.00     Types: Cigarettes     Quit date: 2018     Years since quittin.5   • Smokeless tobacco: Never Used   • Tobacco comment: since , trying to quit   Substance and Sexual Activity   • Alcohol use: No   • Drug use: No   • Sexual activity: Not on file       SURGICAL HISTORY   has a past surgical history that includes hysterectomy laparoscopy; colectomy; knee arthroscopy; and cholecystectomy.    CURRENT MEDICATIONS  Home Medications     Reviewed by Zoe Lassiter R.N. (Registered Nurse) on 20 at 2348  Med List Status: Not Addressed   Medication Last Dose Status   acetaminophen (TYLENOL) 325 MG Tab  Active   albuterol 108 (90 Base) MCG/ACT Aero Soln inhalation aerosol  Active  "  aripiprazole (ABILIFY) 30 MG tablet  Active   atorvastatin (LIPITOR) 20 MG Tab  Active   benztropine (COGENTIN) 1 MG Tab  Active   furosemide (LASIX) 20 MG Tab  Active   haloperidol (HALDOL) 5 MG Tab  Active   ipratropium-albuterol (DUONEB) 0.5-2.5 (3) MG/3ML nebulizer solution  Active   lithium CR (LITHOBID) 300 MG Tab CR  Active   montelukast (SINGULAIR) 10 MG Tab  Active   tiotropium (SPIRIVA HANDIHALER) 18 MCG Cap  Active                ALLERGIES  Allergies   Allergen Reactions   • Amoxicillin Rash     Tolerates cephalosporins   • Penicillin G Itching       PHYSICAL EXAM  VITAL SIGNS: /76   Pulse 78   Temp 36.6 °C (97.8 °F) (Temporal)   Resp 16   Ht 1.575 m (5' 2\")   Wt (!) 128.4 kg (283 lb 1.1 oz)   LMP  (LMP Unknown)   SpO2 97%   BMI 51.77 kg/m²    Constitutional: Alert in no apparent distress.  Obese female.  HENT: Normocephalic, Atraumatic, Bilateral external ears normal. Nose normal.   Eyes: Pupils are equal and reactive. Conjunctiva normal, non-icteric.   Heart: Regular rate and rythm, no murmurs, gallops or rubs.    Lungs: Clear but diminished to auscultation bilaterally. No wheezing, rhonchi, or rales.  Skin: Warm, Dry, No erythema, No rash.   Neurologic: Alert. Patient moves all four extremities and follows commands  Musculoskeletal: Focused exam of the right upper extremity.  There is tenderness palpation over the thenar eminence as well as the base of the thumb.  No obvious deformities are noted.  Sensation is grossly intact.  Patient is able to make a thumbs up, A-OK, and abduct fingers against resistance.  No tenderness palpation in the anatomic snuffbox is noted.  2+ radial pulses present.  Distal cap refill is less than 2 seconds.    ECG  Results for orders placed or performed during the hospital encounter of 11/29/20   EKG (NOW)   Result Value Ref Range    Report       [facility] Emergency Dept.    Test Date:  2020-11-29  Pt Name:    ADELINA MILLAN               "  Department: ER  MRN:        [id number]                      Room:       [facility]  Gender:     Female                       Technician: [id number]  :        1975                   Requested By:MONICA ANN  Order #:    [id number]                    Reading MD: Monica Ann    Measurements  Intervals                                Axis  Rate:       64                           P:          54  CO:         140                          QRS:        43  QRSD:       102                          T:          2  QT:         404  QTc:        417    Interpretive Statements  SINUS RHYTHM  No ST elevation or depression is noted.  Axis is normal.  Intervals are  within  normal limits.  No arrhythmias are noted.  Impression: Normal EKG.  Compared to ECG 2020 12:26:18  No significant changes  Electronically Signed On 2020 0:52:29 PST by Monica Ann       RADIOLOGY  DX-HAND 3+ RIGHT   Final Result      No radiographic evidence of acute traumatic injury.          COURSE & MEDICAL DECISION MAKING  Pertinent Labs & Imaging studies reviewed. (See chart for details)    This is a 45-year-old female presenting to the emergency department for evaluation of right hand pain after an injury.  On initial evaluation, the patient appeared well and in no acute distress.  Her vital signs were normal.  She had tenderness palpation over the thenar eminence and base of the thumb with no obvious deformities.  She is grossly neurovascularly intact and no additional traumatic injuries were noted to the right upper extremity.  Plain films of the hand were obtained.  No acute fractures or dislocations were noted.  Suspect she may have sprained or strained her thumb.  She was placed in a Velcro wrist splint for comfort.  I strongly encouraged her to follow-up with orthopedics.  She was given information for the [place] [facility] and I encouraged her to call Monday morning to schedule a follow-up appointment.  I encouraged her to use  ibuprofen, Tylenol, ice, rest, and elevation for symptomatic relief. A screening EKG was also obtained given the radiation of the arm.  This did not reveal any evidence of acute ischemia or arrhythmia.  I have low clinical suspicion that this is an ACS mimic especially given the sudden onset of pain after traumatic injury.  She is stable for discharge at this time and will follow up.  She will return to the emergency department with any worsening signs or symptoms.    I verified that the patient was wearing a mask and I was wearing appropriate PPE every time I entered the room. The patient's mask was on the patient at all times during my encounter except for a brief view of the oropharynx.    FINAL IMPRESSION  1. Pain of right thumb      PRESCRIPTIONS  New Prescriptions    No medications on file     FOLLOW UP  [facility]  [address] [facility] [address] [phone]  Call in 1 day  To schedule a follow up appointment    [facility], Emergency Dept  [address] [facility] [id number] [phone]  Go to   As needed    -DISCHARGE-    Electronically signed by: Monica Prabhakar D.O., 11/29/2020 12:31 AM

## 2020-11-29 NOTE — ED NOTES
Discharge instructions reviewed with patient; educational materials on right thumb pain provided, patient verbalized understanding.  Pt awake, alert, age-appropriate, well-appearing at time of discharge.   Pt discharged homed with patient.

## 2020-11-29 NOTE — NUR
hydrocodone order for pain, pt states she is okay with taking it and that she 
only goes to NA meetings with friends to support them. md made aware, okay to 
still give

## 2020-11-29 NOTE — NUR
CC OF R THUMB PAIN 9/10 FROM SLAMMING THUMB IN CAR DOOR YESTERDAY AFTER LEAVING 
AN AA MEETING. PAIN HAS NOT GOTTEN BETTER. NO OVBIOUS TRAUMA NOTED TO R THUMB, 
NO OPEN WOUNDS, NO SWELLING NOTED WHEN COMPARED TO OTHER THUMB.

## 2020-12-05 ENCOUNTER — APPOINTMENT (OUTPATIENT)
Dept: RADIOLOGY | Facility: MEDICAL CENTER | Age: 45
End: 2020-12-05
Attending: EMERGENCY MEDICINE
Payer: MEDICAID

## 2020-12-05 ENCOUNTER — HOSPITAL ENCOUNTER (EMERGENCY)
Facility: MEDICAL CENTER | Age: 45
End: 2020-12-05
Attending: EMERGENCY MEDICINE
Payer: MEDICAID

## 2020-12-05 VITALS
RESPIRATION RATE: 23 BRPM | WEIGHT: 285 LBS | HEART RATE: 75 BPM | OXYGEN SATURATION: 98 % | BODY MASS INDEX: 52.44 KG/M2 | SYSTOLIC BLOOD PRESSURE: 110 MMHG | DIASTOLIC BLOOD PRESSURE: 56 MMHG | HEIGHT: 62 IN | TEMPERATURE: 96.7 F

## 2020-12-05 DIAGNOSIS — J18.9 PNEUMONIA OF LEFT LOWER LOBE DUE TO INFECTIOUS ORGANISM: ICD-10-CM

## 2020-12-05 DIAGNOSIS — J44.1 ACUTE EXACERBATION OF CHRONIC OBSTRUCTIVE PULMONARY DISEASE (COPD) (HCC): ICD-10-CM

## 2020-12-05 LAB
COVID ORDER STATUS COVID19: NORMAL
EKG IMPRESSION: NORMAL
SARS-COV-2 RNA RESP QL NAA+PROBE: NOTDETECTED
SPECIMEN SOURCE: NORMAL

## 2020-12-05 PROCEDURE — 71045 X-RAY EXAM CHEST 1 VIEW: CPT

## 2020-12-05 PROCEDURE — 93005 ELECTROCARDIOGRAM TRACING: CPT | Performed by: EMERGENCY MEDICINE

## 2020-12-05 PROCEDURE — 93005 ELECTROCARDIOGRAM TRACING: CPT

## 2020-12-05 PROCEDURE — U0003 INFECTIOUS AGENT DETECTION BY NUCLEIC ACID (DNA OR RNA); SEVERE ACUTE RESPIRATORY SYNDROME CORONAVIRUS 2 (SARS-COV-2) (CORONAVIRUS DISEASE [COVID-19]), AMPLIFIED PROBE TECHNIQUE, MAKING USE OF HIGH THROUGHPUT TECHNOLOGIES AS DESCRIBED BY CMS-2020-01-R: HCPCS

## 2020-12-05 PROCEDURE — 99283 EMERGENCY DEPT VISIT LOW MDM: CPT

## 2020-12-05 PROCEDURE — C9803 HOPD COVID-19 SPEC COLLECT: HCPCS | Performed by: EMERGENCY MEDICINE

## 2020-12-05 RX ORDER — CEFDINIR 300 MG/1
300 CAPSULE ORAL 2 TIMES DAILY
Qty: 14 CAP | Refills: 0 | Status: SHIPPED | OUTPATIENT
Start: 2020-12-05 | End: 2020-12-12

## 2020-12-05 RX ORDER — AZITHROMYCIN 250 MG/1
TABLET, FILM COATED ORAL
Qty: 6 TAB | Refills: 0 | Status: ON HOLD | OUTPATIENT
Start: 2020-12-05 | End: 2021-01-19

## 2020-12-05 ASSESSMENT — FIBROSIS 4 INDEX: FIB4 SCORE: 0.34

## 2020-12-05 NOTE — ED NOTES
Pt educated regarding discharge instructions and demonstrated understanding. Pt did not have MyChart access for her test results so PAR assisted in helping her get access so she can receive her COVID results. Pt ambulatory upon discharge and does not appear to be in any distress at this time. Pt's discharge paperwork and belongings sent home with pt.

## 2020-12-05 NOTE — ED PROVIDER NOTES
ED Provider Note    CHIEF COMPLAINT  Chief Complaint   Patient presents with   • Shortness of Breath     x1 hour [place]   • Shaking     x1 hour       HPI  Lorene Haro is a 45 y.o. female who [place] department [place] [facility] [place].  Patient states she was gambling, she smoked a cigarette and became short of breath.  Patient states she smokes half a pack per day, she does have COPD.  She used her inhaler prior to arrival and feels mostly resolved before this evaluation.  She had some tightness in her chest with her shortness of breath but that is improved as well.  No palpitations or syncope.  Denies cough or sputum production.  Denies fever chills.    REVIEW OF SYSTEMS  See HPI for further details. All other systems are negative.     PAST MEDICAL HISTORY   has a past medical history of Asthma, Bipolar 2 disorder ([facility]), Chickenpox, Chronic obstructive pulmonary disease (HCC), Hypertension, Other psychological stress, and Schizophrenic disorder ([facility]).    SOCIAL HISTORY  Social History     Tobacco Use   • Smoking status: Current Some Day Smoker     Packs/day: 1.00     Years: 20.00     Pack years: 20.00     Types: Cigarettes     Last attempt to quit: 2018     Years since quittin.5   • Smokeless tobacco: Never Used   • Tobacco comment: since , trying to quit   Substance and Sexual Activity   • Alcohol use: No   • Drug use: No   • Sexual activity: Not on file       SURGICAL HISTORY   has a past surgical history that includes hysterectomy laparoscopy; colectomy; cholecystectomy; and knee arthroscopy (Left).    CURRENT MEDICATIONS  Home Medications    **Home medications have not yet been reviewed for this encounter**         ALLERGIES  Allergies   Allergen Reactions   • Amoxicillin Rash     Tolerates cephalosporins   • Penicillin G Itching       PHYSICAL EXAM  VITAL SIGNS: /53   Pulse 64   Temp 35.9 °C (96.7 °F)  "(Temporal)   Resp (!) 22   Ht 1.575 m (5' 2\")   Wt (!) 129.3 kg (285 lb)   LMP  (LMP Unknown)   SpO2 97%   BMI 52.13 kg/m²   Pulse ox interpretation: I interpret this pulse ox as normal.  Constitutional: Alert in no apparent distress.  Morbidly obese  HENT: Normocephalic, atraumatic. Bilateral external ears normal, Nose normal. Moist mucous membranes.    Eyes: Pupils are equal and reactive, Conjunctiva normal.   Neck: Normal range of motion, Supple  Lymphatic: No lymphadenopathy noted.   Cardiovascular: Regular rate and rhythm, no murmurs. Distal pulses intact.   Thorax & Lungs: Normal breath sounds.  No wheezing/rales/ronchi. No increased work of breathing, clipped speech or retractions.   Abdomen: Obese, soft, non-distended, non-tender to palpation.   Skin: Warm, Dry, No erythema, No rash.   Musculoskeletal: Good range of motion in all major joints. No major deformities noted.   Neurologic: Alert and orient x4.  Moves 4 extremity spontaneously.  Psychiatric: Flat affect, cooperative.    DIAGNOSTIC STUDIES / PROCEDURES  LABS  Results for orders placed or performed during the hospital encounter of 20   EKG   Result Value Ref Range    Report       [facility] Emergency Dept.    Test Date:  2020  Pt Name:    ADELINA MILLAN                Department: ER  MRN:        [id number]                      Room:       [facility]  Gender:     Female                       Technician: [id number]  :        1975                   Requested By:ER TRIAGE PROTOCOL  Order #:    [id number]                    Reading MD: ANDREINA GREEN, DO    Measurements  Intervals                                Axis  Rate:       69                           P:          45  TX:         144                          QRS:        69  QRSD:       100                          T:          16  QT:         404  QTc:        433    Interpretive Statements  SINUS RHYTHM  Compared to ECG 2020 00:47:15  ST (T wave) deviation no " longer present  Electronically Signed On 12-5-2020 5:17:47 PST by CANDIS GREEN,          RADIOLOGY  DX-CHEST-PORTABLE (1 VIEW)   Final Result         1.  Left basilar atelectasis or early infiltrate          COURSE & MEDICAL DECISION MAKING  Nursing notes and vital signs were reviewed. (See chart for details)  The patients records were reviewed, history was obtained from the patient;    ED evaluation for shortness of breath is most suggestive of mild COPD exacerbation, symptoms improved with use of her own inhalers prior to arrival.  She is really asymptomatic in the emergency department.  EKG is within normal limits, unchanged from previous and without evidence for ischemia.  Continues telemetry without ectopy or arrhythmia.  X-ray is suggestive of early left basilar infiltrate, given her COPD and shortness of breath should be treated empirically for community-acquired pneumonia with Omnicef and azithromycin.  Covid testing is pending as patient continues to be out in public.  However, and otherwise hemodynamically stable without fever, tachycardia, hypotension or hypoxia.  No indication for further ED work-up or hospitalization at this time.  She is strongly encouraged to quarantine/self isolate until the results of her Covid test are known.    Patient is stable for discharge at this time, anticipatory guidance provided, Omnicef for 7 days, azithromycin for 5 days, continue home medications including inhalers as previously indicated, close follow-up is encouraged, and strict ED return instructions have been detailed. Patient is agreeable to the disposition and plan.    Patient's blood pressure was elevated in the emergency department, and has been referred to primary care for close monitoring.      FINAL IMPRESSION  (J44.1) Acute exacerbation of chronic obstructive pulmonary disease (COPD) ([facility])  (J18.9) Pneumonia of left lower lobe due to infectious organism      Electronically signed by: Candis Green,  JOSÉ, 12/5/2020 5:14 AM      This dictation was created using voice recognition software. The accuracy of the dictation is limited to the abilities of the software. I expect there may be some errors of grammar and possibly content. The nursing notes were reviewed and certain aspects of this information were incorporated into this note.

## 2020-12-05 NOTE — ED TRIAGE NOTES
"Chief Complaint   Patient presents with   • Shortness of Breath     x1 hour [place]   • Shaking     x1 hour   [place] for above complaint. Pt states that she only smokes \"every other week or so.\" Pt denies known exposure to COVID and states she has had 8 negative COVID tests done. Pt denies any pain or any other symptoms at this time.   "

## 2020-12-05 NOTE — DISCHARGE INSTRUCTIONS
Follow-up with primary care next week for reevaluation, medication management close blood pressure monitoring.    Continue home medications as previously indicated, including those inhalers for your COPD.  Add Omnicef twice daily for 7 days and azithromycin daily for 5 days for possible developing pneumonia.    Strongly encourage tobacco cessation.    You have been tested for Covid-19.  Strongly encourage self quarantine isolation until results are known.  [facility].    Return to the emergency department for difficulty breathing, intractable wheezing, chest pain, syncope, altered mental status, fever or other new concerns.

## 2020-12-07 PROCEDURE — 99284 EMERGENCY DEPT VISIT MOD MDM: CPT

## 2020-12-07 ASSESSMENT — FIBROSIS 4 INDEX: FIB4 SCORE: 0.34

## 2020-12-08 ENCOUNTER — PHARMACY VISIT (OUTPATIENT)
Dept: PHARMACY | Facility: MEDICAL CENTER | Age: 45
End: 2020-12-08
Payer: COMMERCIAL

## 2020-12-08 ENCOUNTER — APPOINTMENT (OUTPATIENT)
Dept: RADIOLOGY | Facility: MEDICAL CENTER | Age: 45
End: 2020-12-08
Attending: EMERGENCY MEDICINE
Payer: MEDICAID

## 2020-12-08 ENCOUNTER — HOSPITAL ENCOUNTER (EMERGENCY)
Facility: MEDICAL CENTER | Age: 45
End: 2020-12-08
Attending: EMERGENCY MEDICINE
Payer: MEDICAID

## 2020-12-08 VITALS
HEIGHT: 62 IN | DIASTOLIC BLOOD PRESSURE: 58 MMHG | BODY MASS INDEX: 52.21 KG/M2 | SYSTOLIC BLOOD PRESSURE: 112 MMHG | TEMPERATURE: 96.9 F | WEIGHT: 283.73 LBS | HEART RATE: 74 BPM | RESPIRATION RATE: 18 BRPM | OXYGEN SATURATION: 100 %

## 2020-12-08 DIAGNOSIS — L03.116 BILATERAL LOWER LEG CELLULITIS: ICD-10-CM

## 2020-12-08 DIAGNOSIS — E66.01 MORBID OBESITY (HCC): ICD-10-CM

## 2020-12-08 DIAGNOSIS — R60.0 BILATERAL LOWER EXTREMITY EDEMA: ICD-10-CM

## 2020-12-08 DIAGNOSIS — M79.661 BILATERAL CALF PAIN: ICD-10-CM

## 2020-12-08 DIAGNOSIS — M79.662 BILATERAL CALF PAIN: ICD-10-CM

## 2020-12-08 DIAGNOSIS — L03.115 BILATERAL LOWER LEG CELLULITIS: ICD-10-CM

## 2020-12-08 LAB
ALBUMIN SERPL BCP-MCNC: 4.1 G/DL (ref 3.2–4.9)
ALBUMIN/GLOB SERPL: 1.5 G/DL
ALP SERPL-CCNC: 91 U/L (ref 30–99)
ALT SERPL-CCNC: 12 U/L (ref 2–50)
ANION GAP SERPL CALC-SCNC: 9 MMOL/L (ref 7–16)
APTT PPP: 28.7 SEC (ref 24.7–36)
AST SERPL-CCNC: 5 U/L (ref 12–45)
BASOPHILS # BLD AUTO: 0.2 % (ref 0–1.8)
BASOPHILS # BLD: 0.02 K/UL (ref 0–0.12)
BILIRUB SERPL-MCNC: 0.4 MG/DL (ref 0.1–1.5)
BUN SERPL-MCNC: 10 MG/DL (ref 8–22)
CALCIUM SERPL-MCNC: 9.5 MG/DL (ref 8.5–10.5)
CHLORIDE SERPL-SCNC: 105 MMOL/L (ref 96–112)
CO2 SERPL-SCNC: 26 MMOL/L (ref 20–33)
CREAT SERPL-MCNC: 0.9 MG/DL (ref 0.5–1.4)
EOSINOPHIL # BLD AUTO: 0.15 K/UL (ref 0–0.51)
EOSINOPHIL NFR BLD: 1.2 % (ref 0–6.9)
ERYTHROCYTE [DISTWIDTH] IN BLOOD BY AUTOMATED COUNT: 54.3 FL (ref 35.9–50)
GLOBULIN SER CALC-MCNC: 2.7 G/DL (ref 1.9–3.5)
GLUCOSE SERPL-MCNC: 139 MG/DL (ref 65–99)
HCT VFR BLD AUTO: 38 % (ref 37–47)
HGB BLD-MCNC: 11.4 G/DL (ref 12–16)
IMM GRANULOCYTES # BLD AUTO: 0.05 K/UL (ref 0–0.11)
IMM GRANULOCYTES NFR BLD AUTO: 0.4 % (ref 0–0.9)
INR PPP: 1.05 (ref 0.87–1.13)
LYMPHOCYTES # BLD AUTO: 1.88 K/UL (ref 1–4.8)
LYMPHOCYTES NFR BLD: 15.5 % (ref 22–41)
MCH RBC QN AUTO: 28.6 PG (ref 27–33)
MCHC RBC AUTO-ENTMCNC: 30 G/DL (ref 33.6–35)
MCV RBC AUTO: 95.2 FL (ref 81.4–97.8)
MONOCYTES # BLD AUTO: 0.62 K/UL (ref 0–0.85)
MONOCYTES NFR BLD AUTO: 5.1 % (ref 0–13.4)
NEUTROPHILS # BLD AUTO: 9.44 K/UL (ref 2–7.15)
NEUTROPHILS NFR BLD: 77.6 % (ref 44–72)
NRBC # BLD AUTO: 0 K/UL
NRBC BLD-RTO: 0 /100 WBC
PLATELET # BLD AUTO: 284 K/UL (ref 164–446)
PMV BLD AUTO: 11.1 FL (ref 9–12.9)
POTASSIUM SERPL-SCNC: 3.6 MMOL/L (ref 3.6–5.5)
PROT SERPL-MCNC: 6.8 G/DL (ref 6–8.2)
PROTHROMBIN TIME: 14 SEC (ref 12–14.6)
RBC # BLD AUTO: 3.99 M/UL (ref 4.2–5.4)
SODIUM SERPL-SCNC: 140 MMOL/L (ref 135–145)
WBC # BLD AUTO: 12.2 K/UL (ref 4.8–10.8)

## 2020-12-08 PROCEDURE — 85730 THROMBOPLASTIN TIME PARTIAL: CPT

## 2020-12-08 PROCEDURE — 96365 THER/PROPH/DIAG IV INF INIT: CPT

## 2020-12-08 PROCEDURE — RXMED WILLOW AMBULATORY MEDICATION CHARGE: Performed by: EMERGENCY MEDICINE

## 2020-12-08 PROCEDURE — 80053 COMPREHEN METABOLIC PANEL: CPT

## 2020-12-08 PROCEDURE — 93970 EXTREMITY STUDY: CPT | Mod: 26 | Performed by: INTERNAL MEDICINE

## 2020-12-08 PROCEDURE — 700111 HCHG RX REV CODE 636 W/ 250 OVERRIDE (IP): Performed by: EMERGENCY MEDICINE

## 2020-12-08 PROCEDURE — 96375 TX/PRO/DX INJ NEW DRUG ADDON: CPT

## 2020-12-08 PROCEDURE — 85025 COMPLETE CBC W/AUTO DIFF WBC: CPT

## 2020-12-08 PROCEDURE — 93970 EXTREMITY STUDY: CPT

## 2020-12-08 PROCEDURE — 85610 PROTHROMBIN TIME: CPT

## 2020-12-08 RX ORDER — IBUPROFEN 800 MG/1
800 TABLET ORAL EVERY 8 HOURS PRN
Qty: 30 TAB | Refills: 0 | Status: ON HOLD
Start: 2020-12-08 | End: 2021-01-19

## 2020-12-08 RX ORDER — CEPHALEXIN 500 MG/1
500 CAPSULE ORAL 4 TIMES DAILY
Qty: 40 CAP | Refills: 0 | Status: ON HOLD
Start: 2020-12-08 | End: 2021-01-19

## 2020-12-08 RX ORDER — CEFAZOLIN SODIUM 2 G/100ML
2 INJECTION, SOLUTION INTRAVENOUS ONCE
Status: COMPLETED | OUTPATIENT
Start: 2020-12-08 | End: 2020-12-08

## 2020-12-08 RX ORDER — KETOROLAC TROMETHAMINE 30 MG/ML
30 INJECTION, SOLUTION INTRAMUSCULAR; INTRAVENOUS ONCE
Status: COMPLETED | OUTPATIENT
Start: 2020-12-08 | End: 2020-12-08

## 2020-12-08 RX ADMIN — KETOROLAC TROMETHAMINE 30 MG: 30 INJECTION, SOLUTION INTRAMUSCULAR; INTRAVENOUS at 01:36

## 2020-12-08 RX ADMIN — CEFAZOLIN SODIUM 2 G: 2 INJECTION, SOLUTION INTRAVENOUS at 02:27

## 2020-12-08 ASSESSMENT — PAIN DESCRIPTION - PAIN TYPE: TYPE: ACUTE PAIN

## 2020-12-08 NOTE — DISCHARGE INSTRUCTIONS
Apply warm moist compresses to your calves for pain or hot tub soaks in Epsom salts  Take the medications as directed with food  Follow-up with your primary care provider within a week for recheck  Rest for the next 2 days with no work  Return if any chest pain, shortness of breath, elevated fever greater than 101.

## 2020-12-08 NOTE — ED TRIAGE NOTES
Lorene Haro  45 y.o.  Chief Complaint   Patient presents with   • Leg Swelling     bilat x 2 days; redness noted to bilat lower legs; denies SOB; denies fevers; reports hx of cellulitis to same area

## 2020-12-08 NOTE — ED PROVIDER NOTES
ED Provider Note    Scribed for [place]ia Mora D.O. by Hilton Hall. 12/8/2020  12:52 AM    Primary care provider: SHANKAR Gleason  Means of arrival: Walk-in  History obtained from: Patient  History limited by: None    CHIEF COMPLAINT  Chief Complaint   Patient presents with   • Leg Swelling     bilat x 2 days; redness noted to bilat lower legs; denies SOB; denies fevers; reports hx of cellulitis to same area       HPI  Lorene Haro is a 45 y.o. female with a history of lower extremity cellulitis who presents to the Emergency Department for evaluation of bilateral lower extremity edema onset approximately 2 years ago. The patient reports that after she finished her shift at work from 2:00 PM to 11:00 PM on 12/7/2020, her bilateral lower extremity edema was joined by associated moderate bilateral calf pain, prompting her to visit the ED. She denies any associated chest pain, shortness of breath, or fevers, however, and no alleviating or exacerbating factors were identified for the patient's bilateral calf pain. The patient is allergic to amoxicillin, but she denies any prior history of kidney dysfunction, diabetes, or deep vein thromboses.    PPE Note: I personally donned PPE for all patient encounters during this visit, including wearing an N95 respirator mask and eye protection. Scribe remained outside the patient's room and did not have any contact with the patient for the duration of patient encounter.      REVIEW OF SYSTEMS  Pertinent positives include: bilateral lower extremity edema, bilateral calf pain. Pertinent negatives include no: chest pain, shortness of breath, fevers. See HPI for further details. All other systems are negative.    PAST MEDICAL HISTORY  Past Medical History:   Diagnosis Date   • Asthma    • Bipolar 2 disorder (HCC)    • Chickenpox    • Chronic obstructive pulmonary disease (HCC)    • Hypertension    • Other psychological stress    • Schizophrenic disorder (HCC)         FAMILY HISTORY  Family History   Problem Relation Age of Onset   • No Known Problems Mother    • Cancer Sister        SOCIAL HISTORY  Social History     Socioeconomic History   • Marital status: Single                               Social Needs   • Financial resource strain: Hard   • Food insecurity     Worry: Sometimes true     Inability: Often true   • Transportation needs     Medical: Yes     Non-medical: Yes   Tobacco Use   • Smoking status: Current Some Day Smoker     Packs/day: 1.00     Years: 20.00     Pack years: 20.00     Types: Cigarettes     Last attempt to quit: 2018     Years since quittin.5   • Smokeless tobacco: Never Used   • Tobacco comment: since , trying to quit   Substance and Sexual Activity   • Alcohol use: No   • Drug use: No                                                                                                               Social History Narrative    From [place]       SURGICAL HISTORY  Past Surgical History:   Procedure Laterality Date   • CHOLECYSTECTOMY     • COLECTOMY     • HYSTERECTOMY LAPAROSCOPY     • KNEE ARTHROSCOPY Left        CURRENT MEDICATIONS  Home Medications     Reviewed by Deonna Nogueira R.N. (Registered Nurse) on 20 at 2352  Med List Status: Not Addressed   Medication Last Dose Status   acetaminophen (TYLENOL) 325 MG Tab  Active   albuterol 108 (90 Base) MCG/ACT Aero Soln inhalation aerosol  Active   aripiprazole (ABILIFY) 30 MG tablet  Active   atorvastatin (LIPITOR) 20 MG Tab  Active   azithromycin (ZITHROMAX) 250 MG Tab  Active   benztropine (COGENTIN) 1 MG Tab  Active   cefdinir (OMNICEF) 300 MG Cap  Active   furosemide (LASIX) 20 MG Tab  Active   haloperidol (HALDOL) 5 MG Tab  Active   ipratropium-albuterol (DUONEB) 0.5-2.5 (3) MG/3ML nebulizer solution  Active   lithium CR (LITHOBID) 300 MG Tab CR  Active   montelukast (SINGULAIR) 10 MG Tab  Active   tiotropium (SPIRIVA HANDIHALER) 18 MCG Cap  Active           "      ALLERGIES  Allergies   Allergen Reactions   • Amoxicillin Rash     Tolerates cephalosporins   • Penicillin G Itching       PHYSICAL EXAM  VITAL SIGNS: BP (!) 163/77   Pulse 91   Temp 36.1 °C (96.9 °F) (Temporal)   Resp 18   Ht 1.575 m (5' 2\")   Wt (!) 128.7 kg (283 lb 11.7 oz)   LMP  (LMP Unknown)   SpO2 98%   BMI 51.90 kg/m²     Constitutional: Morbidly obese. Moderate distress. Patient is well developed.   HENT: Normocephalic, atraumatic, oral mucosa is moist.  Cardiovascular: Normal heart rate and rhythm. No murmur  Thorax & Lungs: Clear and equal breath sounds with good excursion. No respiratory distress, no rhonchi, wheezing or rales.  Abdomen: Obese. Bowel sounds normal in all four quadrants. Soft, nontender, no rebound, guarding, or palpable masses.   Skin: Very dry skin with severe dandruff and flakiness on face and around scalp region. Warm, Dry, No rashes.    Extremities: 3+ lower extremity edema with extreme calf tenderness bilaterally. Mild erythema bilaterally. Neurovascular intact. Peripheral pulses 4/4  Musculoskeletal: Normal range of motion in all major joints.  Neurologic: Alert & oriented x 3, Normal motor function, Normal sensory function,  DTR's 4/4 bilaterally.  Psychiatric: Very odd affect. Mood normal.       LABS  Results for orders placed or performed during the hospital encounter of 12/08/20   CBC WITH DIFFERENTIAL   Result Value Ref Range    WBC 12.2 (H) 4.8 - 10.8 K/uL    RBC 3.99 (L) 4.20 - 5.40 M/uL    Hemoglobin 11.4 (L) 12.0 - 16.0 g/dL    Hematocrit 38.0 37.0 - 47.0 %    MCV 95.2 81.4 - 97.8 fL    MCH 28.6 27.0 - 33.0 pg    MCHC 30.0 (L) 33.6 - 35.0 g/dL    RDW 54.3 (H) 35.9 - 50.0 fL    Platelet Count 284 164 - 446 K/uL    MPV 11.1 9.0 - 12.9 fL    Neutrophils-Polys 77.60 (H) 44.00 - 72.00 %    Lymphocytes 15.50 (L) 22.00 - 41.00 %    Monocytes 5.10 0.00 - 13.40 %    Eosinophils 1.20 0.00 - 6.90 %    Basophils 0.20 0.00 - 1.80 %    Immature Granulocytes 0.40 0.00 - " 0.90 %    Nucleated RBC 0.00 /100 WBC    Neutrophils (Absolute) 9.44 (H) 2.00 - 7.15 K/uL    Lymphs (Absolute) 1.88 1.00 - 4.80 K/uL    Monos (Absolute) 0.62 0.00 - 0.85 K/uL    Eos (Absolute) 0.15 0.00 - 0.51 K/uL    Baso (Absolute) 0.02 0.00 - 0.12 K/uL    Immature Granulocytes (abs) 0.05 0.00 - 0.11 K/uL    NRBC (Absolute) 0.00 K/uL   COMP METABOLIC PANEL   Result Value Ref Range    Sodium 140 135 - 145 mmol/L    Potassium 3.6 3.6 - 5.5 mmol/L    Chloride 105 96 - 112 mmol/L    Co2 26 20 - 33 mmol/L    Anion Gap 9.0 7.0 - 16.0    Glucose 139 (H) 65 - 99 mg/dL    Bun 10 8 - 22 mg/dL    Creatinine 0.90 0.50 - 1.40 mg/dL    Calcium 9.5 8.5 - 10.5 mg/dL    AST(SGOT) 5 (L) 12 - 45 U/L    ALT(SGPT) 12 2 - 50 U/L    Alkaline Phosphatase 91 30 - 99 U/L    Total Bilirubin 0.4 0.1 - 1.5 mg/dL    Albumin 4.1 3.2 - 4.9 g/dL    Total Protein 6.8 6.0 - 8.2 g/dL    Globulin 2.7 1.9 - 3.5 g/dL    A-G Ratio 1.5 g/dL   Prothrombin Time   Result Value Ref Range    PT 14.0 12.0 - 14.6 sec    INR 1.05 0.87 - 1.13   APTT   Result Value Ref Range    APTT 28.7 24.7 - 36.0 sec   ESTIMATED GFR   Result Value Ref Range    GFR If African American >60 >60 mL/min/1.73 m 2    GFR If Non African American >60 >60 mL/min/1.73 m 2     All labs reviewed by me.     RADIOLOGY/PROCEDURES  US-EXTREMITY VENOUS LOWER BILAT           The radiologist's interpretation of all radiological studies have been reviewed by me.    COURSE & MEDICAL DECISION MAKING  Pertinent Labs & Imaging studies reviewed. (See chart for details)    12:52 AM - Patient seen and examined at bedside. Patient will be treated with Toradol 30 mg for her bilateral calf pain. Ordered US-Extremity Venous Lower Bilateral, CBC Diff, and CMP to evaluate her symptoms. The differential diagnoses include but are not limited to: DVT, muscle strain.  She was treated with Ancef 2 g IV piggyback for her cellulitis since her ultrasound revealed no DVT.    3:05 AM - Patient was reevaluated at bedside.  Discussed lab and radiology results with the patient and informed them that I am comfortable safely discharging her home safely at this time with a prescription for Keflex 500 mg and Motrin 800 mg. The patient understands to return to the ED for any new or worsening symptoms and was agreeable to the plan for discharge.  She is to apply warm compresses to her legs, take the antibiotics until gone and follow-up with her primary care provider or [facility] within a week for recheck.  She is discharged in stable and improved condition.  The patient will return for new or worsening symptoms and is stable at the time of discharge.      DISPOSITION:  Patient will be discharged home in stable condition.    FOLLOW UP:  SHANKAR Gleason  [address] [place] [id number] [phone]    Schedule an appointment as soon as possible for a visit in 1 week  For recheck      OUTPATIENT MEDICATIONS:  New Prescriptions    CEPHALEXIN (KEFLEX) 500 MG CAP    Take 1 Cap by mouth 4 times a day.    IBUPROFEN (MOTRIN) 800 MG TAB    Take 1 Tab by mouth every 8 hours as needed for Moderate Pain or Inflammation.       FINAL IMPRESSION  1. Bilateral lower extremity edema    2. Bilateral lower leg cellulitis    3. Bilateral calf pain    4. Morbid obesity (HCC)         Hilton HARRIS (Kevonibe), am scribing for, and in the presence of, [place] Mora D.O..    Electronically signed by: Hilton Hall (Kevonibe), 12/8/2020    Al[place] HARRIS D.O. personally performed the services described in this documentation, as scribed by Hilton Hall in my presence, and it is both accurate and complete.    C    The note accurately reflects work and decisions made by me.  [place] Mora D.O.  12/8/2020  9:15 AM

## 2020-12-10 NOTE — NUR
PT AMBULATED TO RESTROOM. PT TOLD RN L LEG/ARM NUMBNESS, PT TOLD MD GRIDER ARM 
NUMBNESS. STROKE ASSESSMENT NEGATIVE. PT COMPLAINING OF DIZZINESS. MD NOTIFIED.

## 2020-12-11 PROCEDURE — 99284 EMERGENCY DEPT VISIT MOD MDM: CPT

## 2020-12-12 ENCOUNTER — APPOINTMENT (OUTPATIENT)
Dept: RADIOLOGY | Facility: MEDICAL CENTER | Age: 45
End: 2020-12-12
Attending: EMERGENCY MEDICINE
Payer: MEDICAID

## 2020-12-12 ENCOUNTER — HOSPITAL ENCOUNTER (EMERGENCY)
Facility: MEDICAL CENTER | Age: 45
End: 2020-12-12
Attending: EMERGENCY MEDICINE
Payer: MEDICAID

## 2020-12-12 ENCOUNTER — HOSPITAL ENCOUNTER (EMERGENCY)
Facility: MEDICAL CENTER | Age: 45
End: 2020-12-13
Attending: EMERGENCY MEDICINE
Payer: MEDICAID

## 2020-12-12 VITALS
RESPIRATION RATE: 17 BRPM | DIASTOLIC BLOOD PRESSURE: 60 MMHG | WEIGHT: 287.7 LBS | BODY MASS INDEX: 54.32 KG/M2 | TEMPERATURE: 97.1 F | HEIGHT: 61 IN | HEART RATE: 75 BPM | SYSTOLIC BLOOD PRESSURE: 135 MMHG | OXYGEN SATURATION: 95 %

## 2020-12-12 DIAGNOSIS — R10.9 ACUTE ABDOMINAL PAIN: ICD-10-CM

## 2020-12-12 DIAGNOSIS — R10.9 ABDOMINAL PAIN, UNSPECIFIED ABDOMINAL LOCATION: ICD-10-CM

## 2020-12-12 DIAGNOSIS — B35.9 TINEA: ICD-10-CM

## 2020-12-12 LAB
ALBUMIN SERPL BCP-MCNC: 4.1 G/DL (ref 3.2–4.9)
ALBUMIN/GLOB SERPL: 1.4 G/DL
ALP SERPL-CCNC: 95 U/L (ref 30–99)
ALT SERPL-CCNC: 11 U/L (ref 2–50)
ANION GAP SERPL CALC-SCNC: 8 MMOL/L (ref 7–16)
APPEARANCE UR: CLEAR
AST SERPL-CCNC: 10 U/L (ref 12–45)
BASOPHILS # BLD AUTO: 0.2 % (ref 0–1.8)
BASOPHILS # BLD: 0.03 K/UL (ref 0–0.12)
BILIRUB SERPL-MCNC: 0.4 MG/DL (ref 0.1–1.5)
BILIRUB UR QL STRIP.AUTO: NEGATIVE
BUN SERPL-MCNC: 9 MG/DL (ref 8–22)
CALCIUM SERPL-MCNC: 9.5 MG/DL (ref 8.5–10.5)
CHLORIDE SERPL-SCNC: 105 MMOL/L (ref 96–112)
CO2 SERPL-SCNC: 27 MMOL/L (ref 20–33)
COLOR UR: YELLOW
CREAT SERPL-MCNC: 0.83 MG/DL (ref 0.5–1.4)
EOSINOPHIL # BLD AUTO: 0.17 K/UL (ref 0–0.51)
EOSINOPHIL NFR BLD: 1.2 % (ref 0–6.9)
ERYTHROCYTE [DISTWIDTH] IN BLOOD BY AUTOMATED COUNT: 53.5 FL (ref 35.9–50)
GLOBULIN SER CALC-MCNC: 3 G/DL (ref 1.9–3.5)
GLUCOSE SERPL-MCNC: 106 MG/DL (ref 65–99)
GLUCOSE UR STRIP.AUTO-MCNC: NEGATIVE MG/DL
HCT VFR BLD AUTO: 39.1 % (ref 37–47)
HGB BLD-MCNC: 11.8 G/DL (ref 12–16)
IMM GRANULOCYTES # BLD AUTO: 0.08 K/UL (ref 0–0.11)
IMM GRANULOCYTES NFR BLD AUTO: 0.6 % (ref 0–0.9)
KETONES UR STRIP.AUTO-MCNC: NEGATIVE MG/DL
LACTATE BLD-SCNC: 1.5 MMOL/L (ref 0.5–2)
LACTATE BLD-SCNC: 2 MMOL/L (ref 0.5–2)
LEUKOCYTE ESTERASE UR QL STRIP.AUTO: NEGATIVE
LIPASE SERPL-CCNC: 19 U/L (ref 11–82)
LYMPHOCYTES # BLD AUTO: 1.65 K/UL (ref 1–4.8)
LYMPHOCYTES NFR BLD: 11.6 % (ref 22–41)
MCH RBC QN AUTO: 28.6 PG (ref 27–33)
MCHC RBC AUTO-ENTMCNC: 30.2 G/DL (ref 33.6–35)
MCV RBC AUTO: 94.9 FL (ref 81.4–97.8)
MICRO URNS: NORMAL
MONOCYTES # BLD AUTO: 0.9 K/UL (ref 0–0.85)
MONOCYTES NFR BLD AUTO: 6.3 % (ref 0–13.4)
NEUTROPHILS # BLD AUTO: 11.35 K/UL (ref 2–7.15)
NEUTROPHILS NFR BLD: 80.1 % (ref 44–72)
NITRITE UR QL STRIP.AUTO: NEGATIVE
NRBC # BLD AUTO: 0 K/UL
NRBC BLD-RTO: 0 /100 WBC
PH UR STRIP.AUTO: 6.5 [PH] (ref 5–8)
PLATELET # BLD AUTO: 276 K/UL (ref 164–446)
PMV BLD AUTO: 10.9 FL (ref 9–12.9)
POTASSIUM SERPL-SCNC: 3.9 MMOL/L (ref 3.6–5.5)
PROT SERPL-MCNC: 7.1 G/DL (ref 6–8.2)
PROT UR QL STRIP: NEGATIVE MG/DL
RBC # BLD AUTO: 4.12 M/UL (ref 4.2–5.4)
RBC UR QL AUTO: NEGATIVE
SODIUM SERPL-SCNC: 140 MMOL/L (ref 135–145)
SP GR UR STRIP.AUTO: 1.01
UROBILINOGEN UR STRIP.AUTO-MCNC: 0.2 MG/DL
WBC # BLD AUTO: 14.2 K/UL (ref 4.8–10.8)

## 2020-12-12 PROCEDURE — 83690 ASSAY OF LIPASE: CPT | Mod: 91

## 2020-12-12 PROCEDURE — 99285 EMERGENCY DEPT VISIT HI MDM: CPT

## 2020-12-12 PROCEDURE — 85025 COMPLETE CBC W/AUTO DIFF WBC: CPT

## 2020-12-12 PROCEDURE — 81003 URINALYSIS AUTO W/O SCOPE: CPT

## 2020-12-12 PROCEDURE — 700111 HCHG RX REV CODE 636 W/ 250 OVERRIDE (IP): Performed by: EMERGENCY MEDICINE

## 2020-12-12 PROCEDURE — 80053 COMPREHEN METABOLIC PANEL: CPT

## 2020-12-12 PROCEDURE — 36415 COLL VENOUS BLD VENIPUNCTURE: CPT

## 2020-12-12 PROCEDURE — 83605 ASSAY OF LACTIC ACID: CPT | Mod: 91

## 2020-12-12 PROCEDURE — 85027 COMPLETE CBC AUTOMATED: CPT | Mod: XU

## 2020-12-12 PROCEDURE — 96374 THER/PROPH/DIAG INJ IV PUSH: CPT | Mod: XU

## 2020-12-12 PROCEDURE — 83690 ASSAY OF LIPASE: CPT

## 2020-12-12 PROCEDURE — 96375 TX/PRO/DX INJ NEW DRUG ADDON: CPT

## 2020-12-12 PROCEDURE — 85007 BL SMEAR W/DIFF WBC COUNT: CPT

## 2020-12-12 PROCEDURE — 700117 HCHG RX CONTRAST REV CODE 255: Performed by: EMERGENCY MEDICINE

## 2020-12-12 PROCEDURE — 74177 CT ABD & PELVIS W/CONTRAST: CPT

## 2020-12-12 PROCEDURE — 83605 ASSAY OF LACTIC ACID: CPT

## 2020-12-12 PROCEDURE — 80053 COMPREHEN METABOLIC PANEL: CPT | Mod: 91

## 2020-12-12 RX ORDER — NOREPINEPHRINE BITARTRATE 0.03 MG/ML
INJECTION, SOLUTION INTRAVENOUS
Status: COMPLETED
Start: 2020-12-12 | End: 2020-12-12

## 2020-12-12 RX ORDER — ONDANSETRON 2 MG/ML
4 INJECTION INTRAMUSCULAR; INTRAVENOUS ONCE
Status: COMPLETED | OUTPATIENT
Start: 2020-12-12 | End: 2020-12-12

## 2020-12-12 RX ORDER — MORPHINE SULFATE 4 MG/ML
4 INJECTION, SOLUTION INTRAMUSCULAR; INTRAVENOUS ONCE
Status: COMPLETED | OUTPATIENT
Start: 2020-12-12 | End: 2020-12-12

## 2020-12-12 RX ORDER — FLUCONAZOLE 200 MG/1
200 TABLET ORAL
Qty: 2 TAB | Refills: 0 | Status: SHIPPED
Start: 2020-12-12 | End: 2020-12-31

## 2020-12-12 RX ADMIN — MORPHINE SULFATE 4 MG: 4 INJECTION INTRAVENOUS at 01:30

## 2020-12-12 RX ADMIN — IOHEXOL 100 ML: 350 INJECTION, SOLUTION INTRAVENOUS at 03:34

## 2020-12-12 RX ADMIN — ONDANSETRON 4 MG: 2 INJECTION INTRAMUSCULAR; INTRAVENOUS at 01:30

## 2020-12-12 ASSESSMENT — FIBROSIS 4 INDEX: FIB4 SCORE: 0.24

## 2020-12-12 NOTE — DISCHARGE INSTRUCTIONS
Please discuss the following findings with your regular doctor:  CT-ABDOMEN-PELVIS WITH   Final Result      Diverticulosis without evidence of diverticulitis.      Stable 3 cm left ovarian cyst.         Labs Reviewed   CBC WITH DIFFERENTIAL - Abnormal; Notable for the following components:       Result Value    WBC 14.2 (*)     RBC 4.12 (*)     Hemoglobin 11.8 (*)     MCHC 30.2 (*)     RDW 53.5 (*)     Neutrophils-Polys 80.10 (*)     Lymphocytes 11.60 (*)     Neutrophils (Absolute) 11.35 (*)     Monos (Absolute) 0.90 (*)     All other components within normal limits   COMP METABOLIC PANEL - Abnormal; Notable for the following components:    Glucose 106 (*)     AST(SGOT) 10 (*)     All other components within normal limits   LIPASE   URINALYSIS,CULTURE IF INDICATED    Narrative:     Indication for culture:->Unexplained new onset of Flank pain  and/or Costovertebral angle tenderness   LACTIC ACID   ESTIMATED GFR

## 2020-12-12 NOTE — ED PROVIDER NOTES
ED Provider Note    Scribed for Gabriel Mohamud M.D. by Gonsalo Licea. 2020  12:46 AM    Primary care provider: SHANKAR Gleason  Means of arrival: Walk-in  History obtained from: Patient  History limited by: None    CHIEF COMPLAINT  Chief Complaint   Patient presents with   • RLQ Pain     Pt reports RLQ pain that radiates to the right flank that started roughly 24 hours ago, 8/10 constant sharp pain. Pt still has their appendix. +RLQ tenderness.   • Flank Pain     Reports after a void pt still has the urge to continue going but is unable to.       HPI  Lorene Haro is a 45 y.o. female with a history of COPD who presents to the Emergency Department for evaluation of acute right sided flank pain onset 24 hours prior to arrival. The pain is severe and constant. She is also having right lower quadrant pain and nausea She still has her appendix. She has never had pain like this before. She is not having any dysuria, hematuria, chest pain, cough, vomiting, diarrhea or fever.    REVIEW OF SYSTEMS  Pertinent positives include: right flank pain, right lower quadrant pain, and nausea. Pertinent negatives include: no dysuria, hematuria, chest pain, cough, vomiting, diarrhea or fever. See history of present illness. All other systems are negative.     PAST MEDICAL HISTORY   has a past medical history of Asthma, Bipolar 2 disorder (HCC), Chickenpox, Chronic obstructive pulmonary disease (HCC), Hypertension, Other psychological stress, and Schizophrenic disorder (HCC).    SURGICAL HISTORY   has a past surgical history that includes hysterectomy laparoscopy; colectomy; cholecystectomy; and knee arthroscopy (Left).    SOCIAL HISTORY  Social History     Tobacco Use   • Smoking status: Current Some Day Smoker     Packs/day: 1.00     Years: 20.00     Pack years: 20.00     Types: Cigarettes     Last attempt to quit: 2018     Years since quittin.5   • Smokeless tobacco: Never Used   • Tobacco comment: since  "1992, trying to quit   Substance Use Topics   • Alcohol use: No   • Drug use: No      Social History     Substance and Sexual Activity   Drug Use No       FAMILY HISTORY  Family History   Problem Relation Age of Onset   • No Known Problems Mother    • Cancer Sister        CURRENT MEDICATIONS  Home Medications     Reviewed by Joie Gallardo R.N. (Registered Nurse) on 12/12/20 at 0008  Med List Status: Partial   Medication Last Dose Status   acetaminophen (TYLENOL) 325 MG Tab  Active   albuterol 108 (90 Base) MCG/ACT Aero Soln inhalation aerosol  Active   aripiprazole (ABILIFY) 30 MG tablet  Active   atorvastatin (LIPITOR) 20 MG Tab  Active   azithromycin (ZITHROMAX) 250 MG Tab  Active   benztropine (COGENTIN) 1 MG Tab  Active   cefdinir (OMNICEF) 300 MG Cap  Active   cephALEXin (KEFLEX) 500 MG Cap  Active   furosemide (LASIX) 20 MG Tab  Active   haloperidol (HALDOL) 5 MG Tab  Active   ibuprofen (MOTRIN) 800 MG Tab  Active   ipratropium-albuterol (DUONEB) 0.5-2.5 (3) MG/3ML nebulizer solution  Active   lithium CR (LITHOBID) 300 MG Tab CR  Active   montelukast (SINGULAIR) 10 MG Tab  Active   tiotropium (SPIRIVA HANDIHALER) 18 MCG Cap  Active                ALLERGIES  Allergies   Allergen Reactions   • Amoxicillin Rash     Tolerates cephalosporins   • Penicillin G Itching       PHYSICAL EXAM  VITAL SIGNS: /54   Pulse 89   Temp 36.2 °C (97.1 °F) (Temporal)   Resp 16   Ht 1.549 m (5' 1\")   Wt (!) 130.5 kg (287 lb 11.2 oz)   LMP  (LMP Unknown)   SpO2 97%   BMI 54.36 kg/m²     Constitutional: Alert, uncomfortable appearing.  HENT: No signs of trauma, Bilateral external ears normal, Nose normal. Uvula midline.   Eyes: Pupils are equal and reactive, Conjunctiva normal, Non-icteric.   Neck: Normal range of motion, No tenderness, Supple, No stridor.   Lymphatic: No lymphadenopathy noted.   Cardiovascular: Regular rate and rhythm, no murmurs.   Thorax & Lungs: Normal breath sounds, No respiratory distress, No " wheezing, No chest tenderness.   Abdomen:  Obese, Soft, right lower quadrant tenderness, No peritoneal signs, No masses, No pulsatile masses. No CVA TTP.   Skin: Warm, Dry, No erythema, Tinea cruris  Back: No bony tenderness, No CVA tenderness.   Extremities: Intact distal pulses, No edema, No tenderness, No cyanosis.  : Tinea infection present in right beni.  No evidence of cellulitis or crepitus.  No tenderness palpation.  Musculoskeletal: Good range of motion in all major joints. No tenderness to palpation or major deformities noted.   Neurologic: Alert , Normal motor function, Normal sensory function, No focal deficits noted.   Psychiatric: Affect normal, Judgment normal, Mood normal.     DIAGNOSTIC STUDIES / PROCEDURES    LABS  Labs Reviewed   CBC WITH DIFFERENTIAL - Abnormal; Notable for the following components:       Result Value    WBC 14.2 (*)     RBC 4.12 (*)     Hemoglobin 11.8 (*)     MCHC 30.2 (*)     RDW 53.5 (*)     Neutrophils-Polys 80.10 (*)     Lymphocytes 11.60 (*)     Neutrophils (Absolute) 11.35 (*)     Monos (Absolute) 0.90 (*)     All other components within normal limits   COMP METABOLIC PANEL - Abnormal; Notable for the following components:    Glucose 106 (*)     AST(SGOT) 10 (*)     All other components within normal limits   LIPASE   URINALYSIS,CULTURE IF INDICATED    Narrative:     Indication for culture:->Unexplained new onset of Flank pain  and/or Costovertebral angle tenderness   LACTIC ACID   ESTIMATED GFR      All labs reviewed by me.    RADIOLOGY  CT-ABDOMEN-PELVIS WITH   Final Result      Diverticulosis without evidence of diverticulitis.      Stable 3 cm left ovarian cyst.        The radiologist's interpretation of all radiological studies have been reviewed by me.    COURSE & MEDICAL DECISION MAKING  Nursing notes, VS, PMSFHx reviewed in chart.    45 y.o. female p/w chief complaint of right lower quadrant and right flank pain.    12:46 AM Patient seen and examined at bedside.  Patient treated with morphine and zofran.    I verified that the patient was wearing a mask and I was wearing appropriate PPE every time I entered the room. The patient's mask was on the patient at all times during my encounter except for a brief view of the oropharynx.     The differential diagnoses include but are not limited to:   #abdominal pain    CT scan of abdomen ordered in order to rule out appendicits  +RLQ pain and TTP  +Right flank pain, no e/o UTI if stone present  No LLQ pain or TTP or fever to suggest diverticulitis  No pain at of proportion to suggest mesenteric ischemia  No e/o rash or zoster  No e/o UTI in UA  No elevation in lipase to suggest pancreatitis    Patient tolerating p.o. prior to discharge and ambulates with steady gait without assistance.   called to help patient establish follow-up appointment.  I discussed potential early appendicitis as potential diagnosis with patient and she agrees to return to the emergency department or dial 911 within 24 hours of symptoms progress or worsen.    FINAL IMPRESSION  1. Acute abdominal pain    2. TinGonsalo Cortes (Ade), am scribing for, and in the presence of, Gabriel Mohamud M.D..    Electronically signed by: Gonsalo Licea (Ade), 12/12/2020    Gabriel HARRIS M.D. personally performed the services described in this documentation, as scribed by Gonsalo Licea in my presence, and it is both accurate and complete.    The note accurately reflects work and decisions made by me.  Gabriel Mohamud M.D.  12/12/2020  4:03 AM

## 2020-12-13 VITALS
HEART RATE: 86 BPM | RESPIRATION RATE: 20 BRPM | OXYGEN SATURATION: 97 % | DIASTOLIC BLOOD PRESSURE: 64 MMHG | TEMPERATURE: 97.1 F | SYSTOLIC BLOOD PRESSURE: 151 MMHG

## 2020-12-13 LAB
ALBUMIN SERPL BCP-MCNC: 4.4 G/DL (ref 3.2–4.9)
ALBUMIN/GLOB SERPL: 1.3 G/DL
ALP SERPL-CCNC: 109 U/L (ref 30–99)
ALT SERPL-CCNC: 15 U/L (ref 2–50)
ANION GAP SERPL CALC-SCNC: 9 MMOL/L (ref 7–16)
ANISOCYTOSIS BLD QL SMEAR: ABNORMAL
APPEARANCE UR: CLEAR
AST SERPL-CCNC: 11 U/L (ref 12–45)
BASOPHILS # BLD AUTO: 0 % (ref 0–1.8)
BASOPHILS # BLD: 0 K/UL (ref 0–0.12)
BILIRUB SERPL-MCNC: 0.6 MG/DL (ref 0.1–1.5)
BILIRUB UR QL STRIP.AUTO: NEGATIVE
BUN SERPL-MCNC: 9 MG/DL (ref 8–22)
CALCIUM SERPL-MCNC: 9.7 MG/DL (ref 8.5–10.5)
CHLORIDE SERPL-SCNC: 102 MMOL/L (ref 96–112)
CO2 SERPL-SCNC: 28 MMOL/L (ref 20–33)
COLOR UR: YELLOW
CREAT SERPL-MCNC: 0.9 MG/DL (ref 0.5–1.4)
DACRYOCYTES BLD QL SMEAR: NORMAL
EOSINOPHIL # BLD AUTO: 0.11 K/UL (ref 0–0.51)
EOSINOPHIL NFR BLD: 0.9 % (ref 0–6.9)
ERYTHROCYTE [DISTWIDTH] IN BLOOD BY AUTOMATED COUNT: 55.1 FL (ref 35.9–50)
GLOBULIN SER CALC-MCNC: 3.3 G/DL (ref 1.9–3.5)
GLUCOSE SERPL-MCNC: 96 MG/DL (ref 65–99)
GLUCOSE UR STRIP.AUTO-MCNC: NEGATIVE MG/DL
HCT VFR BLD AUTO: 42.1 % (ref 37–47)
HGB BLD-MCNC: 12.6 G/DL (ref 12–16)
KETONES UR STRIP.AUTO-MCNC: NEGATIVE MG/DL
LEUKOCYTE ESTERASE UR QL STRIP.AUTO: NEGATIVE
LIPASE SERPL-CCNC: 20 U/L (ref 11–82)
LYMPHOCYTES # BLD AUTO: 2.14 K/UL (ref 1–4.8)
LYMPHOCYTES NFR BLD: 17.4 % (ref 22–41)
MANUAL DIFF BLD: NORMAL
MCH RBC QN AUTO: 28.7 PG (ref 27–33)
MCHC RBC AUTO-ENTMCNC: 29.9 G/DL (ref 33.6–35)
MCV RBC AUTO: 95.9 FL (ref 81.4–97.8)
MICRO URNS: NORMAL
MICROCYTES BLD QL SMEAR: ABNORMAL
MONOCYTES # BLD AUTO: 0.53 K/UL (ref 0–0.85)
MONOCYTES NFR BLD AUTO: 4.3 % (ref 0–13.4)
MORPHOLOGY BLD-IMP: NORMAL
NEUTROPHILS # BLD AUTO: 9.52 K/UL (ref 2–7.15)
NEUTROPHILS NFR BLD: 77.4 % (ref 44–72)
NITRITE UR QL STRIP.AUTO: NEGATIVE
NRBC # BLD AUTO: 0 K/UL
NRBC BLD-RTO: 0 /100 WBC
OVALOCYTES BLD QL SMEAR: NORMAL
PH UR STRIP.AUTO: 6.5 [PH] (ref 5–8)
PLATELET # BLD AUTO: 291 K/UL (ref 164–446)
PLATELET BLD QL SMEAR: NORMAL
PMV BLD AUTO: 10.8 FL (ref 9–12.9)
POIKILOCYTOSIS BLD QL SMEAR: NORMAL
POLYCHROMASIA BLD QL SMEAR: NORMAL
POTASSIUM SERPL-SCNC: 4.2 MMOL/L (ref 3.6–5.5)
PROT SERPL-MCNC: 7.7 G/DL (ref 6–8.2)
PROT UR QL STRIP: NEGATIVE MG/DL
RBC # BLD AUTO: 4.39 M/UL (ref 4.2–5.4)
RBC BLD AUTO: PRESENT
RBC UR QL AUTO: NEGATIVE
SODIUM SERPL-SCNC: 139 MMOL/L (ref 135–145)
SP GR UR STRIP.AUTO: 1.01
UROBILINOGEN UR STRIP.AUTO-MCNC: 0.2 MG/DL
WBC # BLD AUTO: 12.3 K/UL (ref 4.8–10.8)

## 2020-12-13 PROCEDURE — 81003 URINALYSIS AUTO W/O SCOPE: CPT

## 2020-12-13 NOTE — ED NOTES
Report given to [place] GREENE, patient moved to [facility] 67 , placed on monitor.  Patient assisted on hamida, provided a blanket.

## 2020-12-13 NOTE — NUR
PT IN BED, ATTACHED TO MONITORS. STATES SHE WAS SEEN AT [facility] TWO DAYS AGO FOR 
THE SAME ISSUE, AND "THEY FOUND NOTHING WRONG". PT ALSO NOTED TO HAVE A VERY 
LARGE REDDENED, MOIST AREA UNDER RIGHT BREAST.

## 2020-12-13 NOTE — ED NOTES
Pt ambulatory to BR with slow steady gait, 1 person standby assist. Urine sample collected, sent to lab.

## 2020-12-13 NOTE — ED NOTES
Patient WC to PUR 77, patient complaining of RLQ pain, 10/10. Patient states to be nauseous, however states she has not thrown up. She is unable to walk due to the pain, pt states the pain is sharp. Patient is able to stand and pivot with assistance.

## 2020-12-13 NOTE — ED NOTES
Discharge instructions provided, pt verbalizes understanding. Pt is awake, alert, VSS. Pt taken out of ER via w/c. Pt calling MTM for ride home.

## 2020-12-13 NOTE — ED PROVIDER NOTES
ED Provider Note    CHIEF COMPLAINT  Chief Complaint   Patient presents with   • Abdominal Pain       HPI  Lorene Haro is a 45 y.o. female who presents for evaluation of continued right sided abdominal pain. Patient notes the pain has been present for several days and she was seen earlier today for the same reason. A very thorough evaluation was done including CT imaging and labs and no obvious pathology was found. Patient notes that she feels hungry but also feels like if she tries to eat she will vomit. She is not had any blood in the urine or burning with urination and has no pelvic pain. She has no chest pain or shortness of breath.    REVIEW OF SYSTEMS  Constitutional: No fevers or chills  Skin: No rashes  HEENT: No sore throat, runny nose, sores, trouble swallowing, trouble speaking.  Neck: No neck pain  Chest: No pain   Pulm: No shortness of breath, cough, wheezing, stridor, or pain with inspiration/expiration  Gastrointestinal: No vomiting, diarrhea, constipation, bloating, melena, or hematochezia   Genitourinary: No dysuria or hematuria  Musculoskeletal: No  pain, swelling, weakness  Neurologic: No sensory or motor changes. No confusion or disorientation.  Heme: No bleeding or bruising problems.   Immuno: No hx of recurrent infections      PAST MEDICAL HISTORY   has a past medical history of Asthma, Bipolar 2 disorder (HCC), Chickenpox, Chronic obstructive pulmonary disease (HCC), Hypertension, Other psychological stress, and Schizophrenic disorder (HCC).    SOCIAL HISTORY  Social History     Tobacco Use   • Smoking status: Current Some Day Smoker     Packs/day: 1.00     Years: 20.00     Pack years: 20.00     Types: Cigarettes     Last attempt to quit: 2018     Years since quittin.5   • Smokeless tobacco: Never Used   • Tobacco comment: since , trying to quit   Substance and Sexual Activity   • Alcohol use: No   • Drug use: No   • Sexual activity: Not on file       SURGICAL HISTORY   has  a past surgical history that includes hysterectomy laparoscopy; colectomy; cholecystectomy; and knee arthroscopy (Left).    CURRENT MEDICATIONS  Home Medications    **Home medications have not yet been reviewed for this encounter**         ALLERGIES  Allergies   Allergen Reactions   • Amoxicillin Rash     Tolerates cephalosporins   • Penicillin G Itching       PHYSICAL EXAM  VITAL SIGNS: /53   Pulse 73   Temp 36.6 °C (97.8 °F) (Temporal)   Resp 20   LMP  (LMP Unknown)   SpO2 95%    Gen: Alert, mildly anxious, otherwise no apparent distress.. Disheveled, poor hygiene. Sitting in a chair, fully dressed.  HEENT: No signs of trauma, Bilateral external ears normal, Nose normal. Conjunctiva normal, Non-icteric.   Neck:  No tenderness, Supple, No masses  Lymphatic: No cervical lymphadenopathy noted.   Cardiovascular: Regular rate and rhythm, no murmurs.  Capillary refill less than 3 seconds to all extremities, 2+ distal pulses.  Thorax & Lungs: Normal breath sounds, No respiratory distress, No wheezing bilateral chest rise  Abdomen: Bowel sounds normal, Soft, diffuse tenderness to right lateral abdominal wall, diffuse pain between the umbilicus and the epigastric. Both areas seem very mild and there are no peritoneal signs. No Guarding or rebound  Skin: Warm, Dry   Back: No bony tenderness, No CVA tenderness.   Extremities: Intact distal pulses, No edema  Neurologic: Alert , no facial droop, grossly normal coordination and strength  Psychiatric: Affect mildly anxious      LABS  Results for orders placed or performed during the hospital encounter of 12/12/20   CBC WITH DIFFERENTIAL   Result Value Ref Range    WBC 12.3 (H) 4.8 - 10.8 K/uL    RBC 4.39 4.20 - 5.40 M/uL    Hemoglobin 12.6 12.0 - 16.0 g/dL    Hematocrit 42.1 37.0 - 47.0 %    MCV 95.9 81.4 - 97.8 fL    MCH 28.7 27.0 - 33.0 pg    MCHC 29.9 (L) 33.6 - 35.0 g/dL    RDW 55.1 (H) 35.9 - 50.0 fL    Platelet Count 291 164 - 446 K/uL    MPV 10.8 9.0 - 12.9 fL     Neutrophils-Polys 77.40 (H) 44.00 - 72.00 %    Lymphocytes 17.40 (L) 22.00 - 41.00 %    Monocytes 4.30 0.00 - 13.40 %    Eosinophils 0.90 0.00 - 6.90 %    Basophils 0.00 0.00 - 1.80 %    Nucleated RBC 0.00 /100 WBC    Neutrophils (Absolute) 9.52 (H) 2.00 - 7.15 K/uL    Lymphs (Absolute) 2.14 1.00 - 4.80 K/uL    Monos (Absolute) 0.53 0.00 - 0.85 K/uL    Eos (Absolute) 0.11 0.00 - 0.51 K/uL    Baso (Absolute) 0.00 0.00 - 0.12 K/uL    NRBC (Absolute) 0.00 K/uL    Anisocytosis 1+     Microcytosis 1+    COMP METABOLIC PANEL   Result Value Ref Range    Sodium 139 135 - 145 mmol/L    Potassium 4.2 3.6 - 5.5 mmol/L    Chloride 102 96 - 112 mmol/L    Co2 28 20 - 33 mmol/L    Anion Gap 9.0 7.0 - 16.0    Glucose 96 65 - 99 mg/dL    Bun 9 8 - 22 mg/dL    Creatinine 0.90 0.50 - 1.40 mg/dL    Calcium 9.7 8.5 - 10.5 mg/dL    AST(SGOT) 11 (L) 12 - 45 U/L    ALT(SGPT) 15 2 - 50 U/L    Alkaline Phosphatase 109 (H) 30 - 99 U/L    Total Bilirubin 0.6 0.1 - 1.5 mg/dL    Albumin 4.4 3.2 - 4.9 g/dL    Total Protein 7.7 6.0 - 8.2 g/dL    Globulin 3.3 1.9 - 3.5 g/dL    A-G Ratio 1.3 g/dL   LACTIC ACID   Result Value Ref Range    Lactic Acid 1.5 0.5 - 2.0 mmol/L   LIPASE   Result Value Ref Range    Lipase 20 11 - 82 U/L   URINALYSIS (UA)    Specimen: Urine   Result Value Ref Range    Color Yellow     Character Clear     Specific Gravity 1.015 <1.035    Ph 6.5 5.0 - 8.0    Glucose Negative Negative mg/dL    Ketones Negative Negative mg/dL    Protein Negative Negative mg/dL    Bilirubin Negative Negative    Urobilinogen, Urine 0.2 Negative    Nitrite Negative Negative    Leukocyte Esterase Negative Negative    Occult Blood Negative Negative    Micro Urine Req see below    ESTIMATED GFR   Result Value Ref Range    GFR If African American >60 >60 mL/min/1.73 m 2    GFR If Non African American >60 >60 mL/min/1.73 m 2   DIFFERENTIAL MANUAL   Result Value Ref Range    Manual Diff Status PERFORMED    PERIPHERAL SMEAR REVIEW   Result Value Ref  Range    Peripheral Smear Review see below    PLATELET ESTIMATE   Result Value Ref Range    Plt Estimation Normal    MORPHOLOGY   Result Value Ref Range    RBC Morphology Present     Polychromia 1+     Poikilocytosis 1+     Ovalocytes 1+     Tear Drop Cells 1+            COURSE & MEDICAL DECISION MAKING  patient has for evaluation of right sided abdominal pain which seems to be in the mid upper right side of her abdomen and between the epigastric and the umbilicus. This is different than yesterday in which it was in the right lower quadrant. Patient states the pain is still 10 out of 10 and she is afraid to eat despite feeling hungry. She has had no other convincing findings to suggest a significant infectious etiology and specifically denies fevers, chills, diarrhea, constipation, or burning with urination/blood in the urine. Patient will require repeat labs to ensure that there is no significant changes. It is notable that the patient has been seen multiple times over the past year for the same symptoms and no particular urgent cause has been found. She is even been admitted in early November for this issue. No particular emergent pathology was found at that time either. I very much doubt further imaging will be needed as the patient has normal vital signs and does not appear toxic. I feel the amount of CT imaging that she has received in the past year is excessive and CT imaging today is unlikely to benefit the patient . I will reconsider this if the labs are markedly abnormal or worsening.    12:48 AM  Patient has no lab findings or exam findings to suggest an emergent process and I feel she is safe for discharge with outpatient follow-up at this point.  Given the amount of times she has been exhaustively evaluated for this same set of symptoms, I feel it is very unlikely to be related to an emergent problem and I do not feel further inpatient evaluation or expert consultation is necessary.  She  will be encouraged to follow-up with her primary care physician or return if her symptoms worsen or change in any way.  In the meantime she will treat with over-the-counter pain medications such as Motrin or Tylenol.    FINAL IMPRESSION  1. Abdominal pain, unspecified abdominal location        Electronically signed by: Esteban Guillory M.D., 12/12/2020 10:22 PM

## 2020-12-15 NOTE — NUR
PT NAD, RESTING ON GURNEY, DENIES ADDITIONAL NEEDS AT THIS TIME, NO CHANGE IN 
CONDITION, PROVIDED WARM BLANKETS FOR COMFORT, PT UP FOR RECHECK, WCTM.

## 2020-12-15 NOTE — NUR
pt reports she came in due to "my stomach gurgling a lot after i ate a sandwich 
and then i became nauseated and dizzy so i knew something was wrong." denies 
diarrhea or vomitting at this time. pt ambulated to and from restroom with a 
smooth and steady gait, now resting on gurney, placed on spo2/bp monitoring at 
this time. appears comfortable, denies additional questions or needs currently, 
wctm.

## 2020-12-17 ENCOUNTER — PHARMACY VISIT (OUTPATIENT)
Dept: PHARMACY | Facility: MEDICAL CENTER | Age: 45
End: 2020-12-17
Payer: COMMERCIAL

## 2020-12-17 PROCEDURE — RXMED WILLOW AMBULATORY MEDICATION CHARGE: Performed by: EMERGENCY MEDICINE

## 2020-12-18 LAB
AMORPH CRY #/AREA URNS HPF: PRESENT /HPF
APPEARANCE UR: ABNORMAL
BACTERIA #/AREA URNS HPF: ABNORMAL /HPF
BILIRUB UR QL STRIP.AUTO: ABNORMAL
COLOR UR: ABNORMAL
EPI CELLS #/AREA URNS HPF: ABNORMAL /HPF
GLUCOSE UR STRIP.AUTO-MCNC: NEGATIVE MG/DL
HYALINE CASTS #/AREA URNS LPF: ABNORMAL /LPF
KETONES UR STRIP.AUTO-MCNC: NEGATIVE MG/DL
LEUKOCYTE ESTERASE UR QL STRIP.AUTO: ABNORMAL
MICRO URNS: ABNORMAL
MUCOUS THREADS #/AREA URNS HPF: ABNORMAL /HPF
NITRITE UR QL STRIP.AUTO: NEGATIVE
PH UR STRIP.AUTO: 7 [PH] (ref 5–8)
PROT UR QL STRIP: NEGATIVE MG/DL
RBC # URNS HPF: ABNORMAL /HPF
RBC CASTS #/AREA URNS LPF: ABNORMAL /LPF
RBC UR QL AUTO: ABNORMAL
SP GR UR STRIP.AUTO: 1.01
UROBILINOGEN UR STRIP.AUTO-MCNC: 0.2 MG/DL
WBC #/AREA URNS HPF: ABNORMAL /HPF

## 2020-12-18 PROCEDURE — 81001 URINALYSIS AUTO W/SCOPE: CPT | Mod: EDC

## 2020-12-18 PROCEDURE — 99284 EMERGENCY DEPT VISIT MOD MDM: CPT | Mod: EDC

## 2020-12-18 ASSESSMENT — FIBROSIS 4 INDEX: FIB4 SCORE: 0.46

## 2020-12-19 ENCOUNTER — APPOINTMENT (OUTPATIENT)
Dept: RADIOLOGY | Facility: MEDICAL CENTER | Age: 45
End: 2020-12-19
Attending: EMERGENCY MEDICINE
Payer: MEDICAID

## 2020-12-19 ENCOUNTER — HOSPITAL ENCOUNTER (EMERGENCY)
Facility: MEDICAL CENTER | Age: 45
End: 2020-12-19
Attending: EMERGENCY MEDICINE
Payer: MEDICAID

## 2020-12-19 VITALS
SYSTOLIC BLOOD PRESSURE: 126 MMHG | WEIGHT: 287.7 LBS | RESPIRATION RATE: 20 BRPM | BODY MASS INDEX: 54.36 KG/M2 | HEART RATE: 62 BPM | OXYGEN SATURATION: 98 % | DIASTOLIC BLOOD PRESSURE: 64 MMHG | TEMPERATURE: 97 F

## 2020-12-19 DIAGNOSIS — R10.9 RIGHT FLANK PAIN: ICD-10-CM

## 2020-12-19 DIAGNOSIS — N39.0 ACUTE UTI: ICD-10-CM

## 2020-12-19 LAB
ALBUMIN SERPL BCP-MCNC: 4.2 G/DL (ref 3.2–4.9)
ALBUMIN/GLOB SERPL: 1.3 G/DL
ALP SERPL-CCNC: 94 U/L (ref 30–99)
ALT SERPL-CCNC: 12 U/L (ref 2–50)
ANION GAP SERPL CALC-SCNC: 6 MMOL/L (ref 7–16)
AST SERPL-CCNC: 7 U/L (ref 12–45)
BASOPHILS # BLD AUTO: 0.2 % (ref 0–1.8)
BASOPHILS # BLD: 0.03 K/UL (ref 0–0.12)
BILIRUB SERPL-MCNC: 0.4 MG/DL (ref 0.1–1.5)
BUN SERPL-MCNC: 9 MG/DL (ref 8–22)
CALCIUM SERPL-MCNC: 9.7 MG/DL (ref 8.5–10.5)
CHLORIDE SERPL-SCNC: 107 MMOL/L (ref 96–112)
CO2 SERPL-SCNC: 30 MMOL/L (ref 20–33)
CREAT SERPL-MCNC: 1.17 MG/DL (ref 0.5–1.4)
EKG IMPRESSION: NORMAL
EOSINOPHIL # BLD AUTO: 0.17 K/UL (ref 0–0.51)
EOSINOPHIL NFR BLD: 1.4 % (ref 0–6.9)
ERYTHROCYTE [DISTWIDTH] IN BLOOD BY AUTOMATED COUNT: 53.9 FL (ref 35.9–50)
GLOBULIN SER CALC-MCNC: 3.2 G/DL (ref 1.9–3.5)
GLUCOSE SERPL-MCNC: 98 MG/DL (ref 65–99)
HCT VFR BLD AUTO: 39.6 % (ref 37–47)
HGB BLD-MCNC: 12 G/DL (ref 12–16)
IMM GRANULOCYTES # BLD AUTO: 0.05 K/UL (ref 0–0.11)
IMM GRANULOCYTES NFR BLD AUTO: 0.4 % (ref 0–0.9)
LIPASE SERPL-CCNC: 21 U/L (ref 11–82)
LITHIUM SERPL-MCNC: 0.6 MMOL/L (ref 0.6–1.2)
LYMPHOCYTES # BLD AUTO: 1.5 K/UL (ref 1–4.8)
LYMPHOCYTES NFR BLD: 12.4 % (ref 22–41)
MCH RBC QN AUTO: 29.3 PG (ref 27–33)
MCHC RBC AUTO-ENTMCNC: 30.3 G/DL (ref 33.6–35)
MCV RBC AUTO: 96.6 FL (ref 81.4–97.8)
MONOCYTES # BLD AUTO: 0.71 K/UL (ref 0–0.85)
MONOCYTES NFR BLD AUTO: 5.9 % (ref 0–13.4)
NEUTROPHILS # BLD AUTO: 9.67 K/UL (ref 2–7.15)
NEUTROPHILS NFR BLD: 79.7 % (ref 44–72)
NRBC # BLD AUTO: 0 K/UL
NRBC BLD-RTO: 0 /100 WBC
PLATELET # BLD AUTO: 271 K/UL (ref 164–446)
PMV BLD AUTO: 10.7 FL (ref 9–12.9)
POTASSIUM SERPL-SCNC: 4.5 MMOL/L (ref 3.6–5.5)
PROT SERPL-MCNC: 7.4 G/DL (ref 6–8.2)
RBC # BLD AUTO: 4.1 M/UL (ref 4.2–5.4)
SODIUM SERPL-SCNC: 143 MMOL/L (ref 135–145)
TROPONIN T SERPL-MCNC: 20 NG/L (ref 6–19)
TROPONIN T SERPL-MCNC: 22 NG/L (ref 6–19)
WBC # BLD AUTO: 12.1 K/UL (ref 4.8–10.8)

## 2020-12-19 PROCEDURE — 80053 COMPREHEN METABOLIC PANEL: CPT | Mod: EDC

## 2020-12-19 PROCEDURE — 700117 HCHG RX CONTRAST REV CODE 255: Mod: EDC | Performed by: EMERGENCY MEDICINE

## 2020-12-19 PROCEDURE — 80178 ASSAY OF LITHIUM: CPT | Mod: EDC

## 2020-12-19 PROCEDURE — 93005 ELECTROCARDIOGRAM TRACING: CPT | Mod: EDC | Performed by: EMERGENCY MEDICINE

## 2020-12-19 PROCEDURE — 84484 ASSAY OF TROPONIN QUANT: CPT | Mod: 91,EDC

## 2020-12-19 PROCEDURE — 85025 COMPLETE CBC W/AUTO DIFF WBC: CPT | Mod: EDC

## 2020-12-19 PROCEDURE — 36415 COLL VENOUS BLD VENIPUNCTURE: CPT | Mod: EDC

## 2020-12-19 PROCEDURE — 700102 HCHG RX REV CODE 250 W/ 637 OVERRIDE(OP): Mod: EDC | Performed by: EMERGENCY MEDICINE

## 2020-12-19 PROCEDURE — 83690 ASSAY OF LIPASE: CPT | Mod: EDC

## 2020-12-19 PROCEDURE — A9270 NON-COVERED ITEM OR SERVICE: HCPCS | Mod: EDC | Performed by: EMERGENCY MEDICINE

## 2020-12-19 PROCEDURE — 74177 CT ABD & PELVIS W/CONTRAST: CPT

## 2020-12-19 RX ORDER — CEFDINIR 300 MG/1
300 CAPSULE ORAL 2 TIMES DAILY
Qty: 20 CAP | Refills: 0 | Status: SHIPPED | OUTPATIENT
Start: 2020-12-19 | End: 2020-12-26

## 2020-12-19 RX ORDER — CEFDINIR 300 MG/1
300 CAPSULE ORAL ONCE
Status: COMPLETED | OUTPATIENT
Start: 2020-12-19 | End: 2020-12-19

## 2020-12-19 RX ADMIN — CEFDINIR 300 MG: 300 CAPSULE ORAL at 04:54

## 2020-12-19 RX ADMIN — IOHEXOL 100 ML: 350 INJECTION, SOLUTION INTRAVENOUS at 03:23

## 2020-12-19 NOTE — ED NOTES
Pt ambulatory to room 40 with steady gait, pt reporing 9/10 pain new onset last night to R flank and upper epigastric pain. Denies fever. LS diminished.

## 2020-12-19 NOTE — ED TRIAGE NOTES
Patient brought in by EMS for abdominal pain that started 2 hours ago.  Patient received 4mg ODT zofran.  RUQ pain that radiates into her back, increased pain with respiration.  Pain started 3 hours after eating fries.  Seen here 1 week ago for similar symptoms.  Gallbladder removed in 2004.

## 2020-12-19 NOTE — ED PROVIDER NOTES
ED Provider Note    CHIEF COMPLAINT  Chief Complaint   Patient presents with   • Abdominal Pain       HPI  Lorene Haro is a 45 y.o. female who presents to the emergency department with complaint of right flank pain. Past history is diagnosed below. Prior cholecystectomy. Denies she was at the [employer] and then have hamburger, French fries and a drink. About an hourr after that she considered having the intermittent) patient describes a sharp. Imminently radiating down towards her right flank and right lower quadrant. Currently asymptomatic. Denies any other vomiting. No recent difficulty with urination or bowel movements. No changes. No known cover contact.    REVIEW OF SYSTEMS  See HPI for further details. All other systems are negative.     PAST MEDICAL HISTORY   has a past medical history of Asthma, Bipolar 2 disorder (HCC), Chickenpox, Chronic obstructive pulmonary disease (HCC), Hypertension, Other psychological stress, and Schizophrenic disorder (HCC).    SOCIAL HISTORY  Social History     Tobacco Use   • Smoking status: Current Some Day Smoker     Packs/day: 1.00     Years: 20.00     Pack years: 20.00     Types: Cigarettes     Last attempt to quit: 2018     Years since quittin.6   • Smokeless tobacco: Never Used   • Tobacco comment: since , trying to quit   Substance and Sexual Activity   • Alcohol use: No   • Drug use: No   • Sexual activity: Not on file       SURGICAL HISTORY   has a past surgical history that includes hysterectomy laparoscopy; colectomy; cholecystectomy; and knee arthroscopy (Left).    CURRENT MEDICATIONS  Home Medications    **Home medications have not yet been reviewed for this encounter**         ALLERGIES  Allergies   Allergen Reactions   • Amoxicillin Rash     Tolerates cephalosporins   • Penicillin G Itching       PHYSICAL EXAM  VITAL SIGNS: /58   Pulse 66   Temp 36.5 °C (97.7 °F) (Temporal)   Resp 20   Wt (!) 130.5 kg (287 lb 11.2 oz)   LMP  (LMP  Unknown)   SpO2 95%   BMI 54.36 kg/m²  @[email or web address][[id number]::@   Pulse ox interpretation: I interpret this pulse ox as normal.  Constitutional: Alert in no apparent distress.  HENT: No signs of trauma, Bilateral external ears normal, Nose normal.   Eyes: Pupils are equal and reactive  Neck: Normal range of motion, No tenderness, Supple  Cardiovascular: Regular rate and rhythm, no murmurs.   Thorax & Lungs: Normal breath sounds, No respiratory distress, No wheezing, No chest tenderness.   Abdomen: overweight, bowel sounds normal, Soft, No tenderness  Skin: Warm, Dry, No erythema, No rash.   Extremities: Intact distal pulses, No edema, No tenderness  Musculoskeletal: Good range of motion in all major joints. No tenderness to palpation or major deformities noted.   Neurologic: Alert , Normal motor function, Normal sensory function, No focal deficits noted.   Psychiatric: psychiatric affect, judgment normal, Mood normal.       DIAGNOSTIC STUDIES / PROCEDURES    EKG  Results for orders placed or performed during the hospital encounter of 20   EKG   Result Value Ref Range    Report       [facility] Emergency Dept.    Test Date:  2020  Pt Name:    ADELINA MILLAN                Department: ER  MRN:        [id number]                      Room:       [facility]  Gender:     Female                       Technician: [id number]  :        1975                   Requested By:BIANKA POWELL  Order #:    [id number]                    Reading MD:    Measurements  Intervals                                Axis  Rate:       63                           P:          12  CT:         138                          QRS:        53  QRSD:       103                          T:          1  QT:         408  QTc:        418    Interpretive Statements  Sinus rhythm  Low voltage, precordial leads  Compared to ECG 2020 03:51:59  Low QRS voltage now present         LABS  Results for orders placed or performed during the hospital  encounter of 12/19/20   CBC WITH DIFFERENTIAL   Result Value Ref Range    WBC 12.1 (H) 4.8 - 10.8 K/uL    RBC 4.10 (L) 4.20 - 5.40 M/uL    Hemoglobin 12.0 12.0 - 16.0 g/dL    Hematocrit 39.6 37.0 - 47.0 %    MCV 96.6 81.4 - 97.8 fL    MCH 29.3 27.0 - 33.0 pg    MCHC 30.3 (L) 33.6 - 35.0 g/dL    RDW 53.9 (H) 35.9 - 50.0 fL    Platelet Count 271 164 - 446 K/uL    MPV 10.7 9.0 - 12.9 fL    Neutrophils-Polys 79.70 (H) 44.00 - 72.00 %    Lymphocytes 12.40 (L) 22.00 - 41.00 %    Monocytes 5.90 0.00 - 13.40 %    Eosinophils 1.40 0.00 - 6.90 %    Basophils 0.20 0.00 - 1.80 %    Immature Granulocytes 0.40 0.00 - 0.90 %    Nucleated RBC 0.00 /100 WBC    Neutrophils (Absolute) 9.67 (H) 2.00 - 7.15 K/uL    Lymphs (Absolute) 1.50 1.00 - 4.80 K/uL    Monos (Absolute) 0.71 0.00 - 0.85 K/uL    Eos (Absolute) 0.17 0.00 - 0.51 K/uL    Baso (Absolute) 0.03 0.00 - 0.12 K/uL    Immature Granulocytes (abs) 0.05 0.00 - 0.11 K/uL    NRBC (Absolute) 0.00 K/uL   COMP METABOLIC PANEL   Result Value Ref Range    Sodium 143 135 - 145 mmol/L    Potassium 4.5 3.6 - 5.5 mmol/L    Chloride 107 96 - 112 mmol/L    Co2 30 20 - 33 mmol/L    Anion Gap 6.0 (L) 7.0 - 16.0    Glucose 98 65 - 99 mg/dL    Bun 9 8 - 22 mg/dL    Creatinine 1.17 0.50 - 1.40 mg/dL    Calcium 9.7 8.5 - 10.5 mg/dL    AST(SGOT) 7 (L) 12 - 45 U/L    ALT(SGPT) 12 2 - 50 U/L    Alkaline Phosphatase 94 30 - 99 U/L    Total Bilirubin 0.4 0.1 - 1.5 mg/dL    Albumin 4.2 3.2 - 4.9 g/dL    Total Protein 7.4 6.0 - 8.2 g/dL    Globulin 3.2 1.9 - 3.5 g/dL    A-G Ratio 1.3 g/dL   LIPASE   Result Value Ref Range    Lipase 21 11 - 82 U/L   URINALYSIS,CULTURE IF INDICATED    Specimen: Urine, Clean Catch   Result Value Ref Range    Color DK Yellow     Character Turbid (A)     Specific Gravity 1.011 <1.035    Ph 7.0 5.0 - 8.0    Glucose Negative Negative mg/dL    Ketones Negative Negative mg/dL    Protein Negative Negative mg/dL    Bilirubin Small (A) Negative    Urobilinogen, Urine 0.2 Negative     Nitrite Negative Negative    Leukocyte Esterase Moderate (A) Negative    Occult Blood Moderate (A) Negative    Micro Urine Req Microscopic    URINE MICROSCOPIC (W/UA)   Result Value Ref Range    WBC 2-5 /hpf    RBC 10-20 (A) /hpf    Bacteria Many (A) None /hpf    Epithelial Cells Few /hpf    Mucous Threads Moderate /hpf    Amorphous Crystal Present /hpf    Hyaline Cast 6-10 (A) /lpf    Red Cell Cast 0-2 /lpf   TROPONIN   Result Value Ref Range    Troponin T 20 (H) 6 - 19 ng/L   LITHIUM   Result Value Ref Range    Lithium 0.6 0.6 - 1.2 mmol/L   ESTIMATED GFR   Result Value Ref Range    GFR If African American 60 >60 mL/min/1.73 m 2    GFR If Non African American 50 (A) >60 mL/min/1.73 m 2   TROPONIN   Result Value Ref Range    Troponin T 22 (H) 6 - 19 ng/L   EKG   Result Value Ref Range    Report       [facility] Emergency Dept.    Test Date:  2020  Pt Name:    ADELINA MILLAN                Department: ER  MRN:        [id number]                      Room:       [facility]  Gender:     Female                       Technician: [id number]  :        1975                   Requested By:BIANKA POWELL  Order #:    [id number]                    Reading MD:    Measurements  Intervals                                Axis  Rate:       63                           P:          12  DE:         138                          QRS:        53  QRSD:       103                          T:          1  QT:         408  QTc:        418    Interpretive Statements  Sinus rhythm  Low voltage, precordial leads  Compared to ECG 2020 03:51:59  Low QRS voltage now present           RADIOLOGY  CT-ABDOMEN-PELVIS WITH   Final Result         1.  Mildly distended fluid-filled loops in the left abdomen suggests ileus or enteritis.   2.  Left ovarian cyst, six-week follow-up pelvic sonogram for further characterization recommended.              COURSE & MEDICAL DECISION MAKING  Pertinent Labs & Imaging studies reviewed. (See  chart for details)  45-year-old female presented emergency room with above complaint. I do believe that her symptomatology is likely secondary culinary tract infection which may be early pyelonephritis and also the enteritis as noted on CT imaging. Initial screening EKG and troponin were ordered. Troponin was one point above our normal reference range. From an ACL standpoint I do believe that she is low risk and she does have a heart score of two. Serial troponin was ordered and she did have a troponin elevation from 20 to 22 but I do not believe this is a statistically significant and she is asymptomatic at this point. I have offered inpatient and outpatient risks and benefits that this plaintiff patient wishes to follow-up as an outpatient. She is nursing return precautions if needed. She will complete antibiotics. She will obtain an appointment with cardiology next week. Cardiology office  has been called tonight as well.    The patient will return for worsening symptoms and is stable at the time of discharge. The patient verbalizes understanding and will comply.    FINAL IMPRESSION  1. Right flank pain    2. Acute UTI            Electronically signed by: Juan Ann M.D., 12/19/2020 3:05 AM

## 2020-12-19 NOTE — ED NOTES
Lorene Haro D/C'd.  Discharge instructions including the importance of hydration, the use of OTC medications, information on chest pain, asymptomatic bacturemia and the proper follow up recommendations have been provided to the pt.  Pt states understanding.  Pt states all questions have been answered.  A copy of the discharge instructions have been provided to pt.  A signed copy is in the chart.  Prescription for omnicef provided to pt.   Pt ambulatory out of department; pt in NAD, awake, alert.  /64   Pulse 62   Temp 36.1 °C (97 °F) (Temporal)   Resp 20   Wt (!) 130.5 kg (287 lb 11.2 oz)   LMP  (LMP Unknown)   SpO2 98%   BMI 54.36 kg/m²

## 2020-12-20 ENCOUNTER — HOSPITAL ENCOUNTER (EMERGENCY)
Facility: MEDICAL CENTER | Age: 45
End: 2020-12-20
Attending: EMERGENCY MEDICINE
Payer: MEDICAID

## 2020-12-20 VITALS
BODY MASS INDEX: 54.36 KG/M2 | WEIGHT: 287.7 LBS | HEART RATE: 61 BPM | SYSTOLIC BLOOD PRESSURE: 135 MMHG | DIASTOLIC BLOOD PRESSURE: 74 MMHG | RESPIRATION RATE: 18 BRPM | OXYGEN SATURATION: 93 % | TEMPERATURE: 98.6 F

## 2020-12-20 DIAGNOSIS — R10.11 RIGHT UPPER QUADRANT ABDOMINAL PAIN: ICD-10-CM

## 2020-12-20 DIAGNOSIS — F41.9 ANXIETY: ICD-10-CM

## 2020-12-20 PROCEDURE — 99284 EMERGENCY DEPT VISIT MOD MDM: CPT | Mod: EDC

## 2020-12-20 ASSESSMENT — FIBROSIS 4 INDEX: FIB4 SCORE: 0.35

## 2020-12-20 NOTE — ED TRIAGE NOTES
Lorene Haro  Chief Complaint   Patient presents with   • Abdominal Pain     seen here yesterday for same, and has not picked up rx yet   • Anxiety     Pt biba from home, to triage in w/c.  VSS, no acute distress.   Pt with developmental delays, poor historian, but seen yesterday dx with UTI and sent with abx prescription that pt has not picked yet.  +dysuria.    Per EMS pt anxious as room mate was not home  Pt/staff masked and in appropriate PPE during encounter.   Pt returned to [facility], educated on triage process, and to inform staff of any changes or concerns.

## 2020-12-20 NOTE — ED PROVIDER NOTES
ED Provider Note    ED Provider Note    Primary care provider: SHANKAR Gleason  Means of arrival: EMS  History obtained from: Patient    CHIEF COMPLAINT  Chief Complaint   Patient presents with   • Abdominal Pain     seen here yesterday for same, and has not picked up rx yet   • Anxiety     Seen at 12:01 PM.   HPI  Lorenesamina Haro is a 45 y.o. female who presents to the Emergency Department for abdominal pain.  The patient states that she kind of still has abdominal pain since she was seen here yesterday.  She has not picked up her antibiotics.  She has not eaten today and she is hungry.  She denies any vomiting or diarrhea.  She denies fevers, chills, chest pain or shortness of breath.  She states that somebody told her she might have a heart condition and she is supposed to follow-up with cardiology so she is anxious about that.    REVIEW OF SYSTEMS  See HPI,   Remainder of ROS negative.     PAST MEDICAL HISTORY   has a past medical history of Asthma, Bipolar 2 disorder (HCC), Chickenpox, Chronic obstructive pulmonary disease (HCC), Hypertension, Other psychological stress, and Schizophrenic disorder (HCC).    SURGICAL HISTORY   has a past surgical history that includes hysterectomy laparoscopy; colectomy; cholecystectomy; and knee arthroscopy (Left).    SOCIAL HISTORY  Social History     Tobacco Use   • Smoking status: Current Some Day Smoker     Packs/day: 1.00     Years: 20.00     Pack years: 20.00     Types: Cigarettes     Last attempt to quit: 2018     Years since quittin.6   • Smokeless tobacco: Never Used   • Tobacco comment: since , trying to quit   Substance Use Topics   • Alcohol use: No   • Drug use: No      Social History     Substance and Sexual Activity   Drug Use No       FAMILY HISTORY  Family History   Problem Relation Age of Onset   • No Known Problems Mother    • Cancer Sister        CURRENT MEDICATIONS  Reviewed.  See Encounter Summary.     ALLERGIES  Allergies   Allergen  Reactions   • Amoxicillin Rash     Tolerates cephalosporins   • Penicillin G Itching       PHYSICAL EXAM  VITAL SIGNS: /74   Pulse 61   Temp 37 °C (98.6 °F) (Temporal)   Resp 18   Wt (!) 130.5 kg (287 lb 11.2 oz)   LMP  (LMP Unknown)   SpO2 93%   BMI 54.36 kg/m²   Constitutional: Awake, alert in no apparent distress.  Morbidly obese.  HENT: Normocephalic, Bilateral external ears normal. Nose normal.   Eyes: Conjunctiva normal, non-icteric, EOMI.    Thorax & Lungs: Easy unlabored respirations, Clear to ascultation bilaterally.  Cardiovascular: Regular rate, Regular rhythm, No murmurs, rubs or gallops. Bilateral pulses symmetrical.   Abdomen:  Soft, nontender, nondistended, normal active bowel sounds.   :    Skin: Visualized skin is  Dry, No erythema, No rash.   Musculoskeletal:   No cyanosis, clubbing or edema. No leg asymmetry.   Neurologic: Alert, Grossly non-focal.   Psychiatric: Normal affect, Normal mood  Lymphatic:  No cervical LAD      RADIOLOGY  No orders to display         COURSE & MEDICAL DECISION MAKING  Pertinent Labs & Imaging studies reviewed. (See chart for details)        12:01 PM - Medical record reviewed, patient had a CT scan 12/19 at 3 AM that showed possibly enteritis.  Labs were unremarkable.    Decision Making:  This is a 45 y.o. year old female who presents with some abdominal pain.  The patient is a poor historian.  She does have a history of bipolar.  She is in the emergency department on a regular basis and has been seen here several times this week alone.  She had a very thorough work-up done fewer than 48 hours ago.  The CT did show some possible bowel dilatation.  The patient does not have any clinical findings concerning for an SBO.  She has not vomited, she is passing regular bowel movements.  She is hungry currently.  Abdominal examination is benign.  I see no indication to repeat the work-up or repeat the urinalysis given that she has not filled the antibiotics from  her presentation yesterday.  Plan to discharge her and the patient is in agreement.    Discharge Medications:  Discharge Medication List as of 12/20/2020 12:06 PM          The patient was discharged home (see d/c instructions) was told to return immediately for any signs or symptoms listed, or any worsening at all.  The patient verbally agreed to the discharge precautions and follow-up plan which is documented in EPIC.    The patient's blood pressure is elevated today. >120/80. I have referred them to primary care for follow up.       FINAL IMPRESSION  1. Right upper quadrant abdominal pain    2. Anxiety

## 2020-12-29 ENCOUNTER — TELEPHONE (OUTPATIENT)
Dept: CARDIOLOGY | Facility: MEDICAL CENTER | Age: 45
End: 2020-12-29

## 2020-12-29 NOTE — TELEPHONE ENCOUNTER
Called patient to see if she has followed with a cardiologist in the past to get records prior to new patient appt with [facility].     She denies any cardiac testing outside of [facility]. Confirmed appointment date and time.

## 2021-01-06 ENCOUNTER — OFFICE VISIT (OUTPATIENT)
Dept: CARDIOLOGY | Facility: MEDICAL CENTER | Age: 46
End: 2021-01-06
Payer: MEDICAID

## 2021-01-06 VITALS
WEIGHT: 279 LBS | OXYGEN SATURATION: 96 % | HEIGHT: 61 IN | HEART RATE: 54 BPM | DIASTOLIC BLOOD PRESSURE: 58 MMHG | RESPIRATION RATE: 18 BRPM | SYSTOLIC BLOOD PRESSURE: 102 MMHG | BODY MASS INDEX: 52.67 KG/M2

## 2021-01-06 DIAGNOSIS — I27.20 PULMONARY HYPERTENSION (HCC): ICD-10-CM

## 2021-01-06 DIAGNOSIS — Z72.0 TOBACCO USE: ICD-10-CM

## 2021-01-06 DIAGNOSIS — E66.01 CLASS 3 SEVERE OBESITY DUE TO EXCESS CALORIES WITH SERIOUS COMORBIDITY AND BODY MASS INDEX (BMI) OF 50.0 TO 59.9 IN ADULT (HCC): ICD-10-CM

## 2021-01-06 LAB — EKG IMPRESSION: NORMAL

## 2021-01-06 PROCEDURE — 93000 ELECTROCARDIOGRAM COMPLETE: CPT | Performed by: INTERNAL MEDICINE

## 2021-01-06 PROCEDURE — 99204 OFFICE O/P NEW MOD 45 MIN: CPT | Mod: 25 | Performed by: INTERNAL MEDICINE

## 2021-01-06 PROCEDURE — 99406 BEHAV CHNG SMOKING 3-10 MIN: CPT | Performed by: INTERNAL MEDICINE

## 2021-01-06 ASSESSMENT — ENCOUNTER SYMPTOMS
ABDOMINAL PAIN: 0
BACK PAIN: 0
FLANK PAIN: 0
IRREGULAR HEARTBEAT: 0
SYNCOPE: 0
PALPITATIONS: 0
ORTHOPNEA: 0
DYSPNEA ON EXERTION: 1
NEAR-SYNCOPE: 0
FEVER: 0
DEPRESSION: 0
DECREASED APPETITE: 0
DIZZINESS: 0
BLURRED VISION: 0
WEIGHT GAIN: 0
VOMITING: 0
NAUSEA: 0
CONSTIPATION: 0
WEIGHT LOSS: 0
PND: 0
DIARRHEA: 0
HEARTBURN: 0
COUGH: 0
CLAUDICATION: 0
SHORTNESS OF BREATH: 0
ALTERED MENTAL STATUS: 0

## 2021-01-06 ASSESSMENT — FIBROSIS 4 INDEX: FIB4 SCORE: 0.35

## 2021-01-06 NOTE — PATIENT INSTRUCTIONS
"Smoking Cessation, Tips for Success  If you are ready to quit smoking, congratulations! You have chosen to help yourself be healthier. Cigarettes bring nicotine, tar, carbon monoxide, and other irritants into your body. Your lungs, heart, and blood vessels will be able to work better without these poisons. There are many different ways to quit smoking. Nicotine gum, nicotine patches, a nicotine inhaler, or nicotine nasal spray can help with physical craving. Hypnosis, support groups, and medicines help break the habit of smoking.  WHAT THINGS CAN I DO TO MAKE QUITTING EASIER?   Here are some tips to help you quit for good:  · Pick a date when you will quit smoking completely. Tell all of your friends and family about your plan to quit on that date.  · Do not try to slowly cut down on the number of cigarettes you are smoking. Pick a quit date and quit smoking completely starting on that day.  · Throw away all cigarettes.    · Clean and remove all ashtrays from your home, work, and car.  · On a card, write down your reasons for quitting. Carry the card with you and read it when you get the urge to smoke.  · Cleanse your body of nicotine. Drink enough water and fluids to keep your urine clear or pale yellow. Do this after quitting to flush the nicotine from your body.  · Learn to predict your moods. Do not let a bad situation be your excuse to have a cigarette. Some situations in your life might tempt you into wanting a cigarette.  · Never have \"just one\" cigarette. It leads to wanting another and another. Remind yourself of your decision to quit.  · Change habits associated with smoking. If you smoked while driving or when feeling stressed, try other activities to replace smoking. Stand up when drinking your coffee. Brush your teeth after eating. Sit in a different chair when you read the paper. Avoid alcohol while trying to quit, and try to drink fewer caffeinated beverages. Alcohol and caffeine may urge you to " "smoke.  · Avoid foods and drinks that can trigger a desire to smoke, such as sugary or spicy foods and alcohol.  · Ask people who smoke not to smoke around you.  · Have something planned to do right after eating or having a cup of coffee. For example, plan to take a walk or exercise.  · Try a relaxation exercise to calm you down and decrease your stress. Remember, you may be tense and nervous for the first 2 weeks after you quit, but this will pass.  · Find new activities to keep your hands busy. Play with a pen, coin, or rubber band. Doodle or draw things on paper.  · Brush your teeth right after eating. This will help cut down on the craving for the taste of tobacco after meals. You can also try mouthwash.    · Use oral substitutes in place of cigarettes. Try using lemon drops, carrots, cinnamon sticks, or chewing gum. Keep them handy so they are available when you have the urge to smoke.  · When you have the urge to smoke, try deep breathing.  · Designate your home as a nonsmoking area.  · If you are a heavy smoker, ask your health care provider about a prescription for nicotine chewing gum. It can ease your withdrawal from nicotine.  · Reward yourself. Set aside the cigarette money you save and buy yourself something nice.  · Look for support from others. Join a support group or smoking cessation program. Ask someone at home or at work to help you with your plan to quit smoking.  · Always ask yourself, \"Do I need this cigarette or is this just a reflex?\" Tell yourself, \"Today, I choose not to smoke,\" or \"I do not want to smoke.\" You are reminding yourself of your decision to quit.  · Do not replace cigarette smoking with electronic cigarettes (commonly called e-cigarettes). The safety of e-cigarettes is unknown, and some may contain harmful chemicals.  · If you relapse, do not give up! Plan ahead and think about what you will do the next time you get the urge to smoke.  HOW WILL I FEEL WHEN I QUIT SMOKING?  You " may have symptoms of withdrawal because your body is used to nicotine (the addictive substance in cigarettes). You may crave cigarettes, be irritable, feel very hungry, cough often, get headaches, or have difficulty concentrating. The withdrawal symptoms are only temporary. They are strongest when you first quit but will go away within 10-14 days. When withdrawal symptoms occur, stay in control. Think about your reasons for quitting. Remind yourself that these are signs that your body is healing and getting used to being without cigarettes. Remember that withdrawal symptoms are easier to treat than the major diseases that smoking can cause.   Even after the withdrawal is over, expect periodic urges to smoke. However, these cravings are generally short lived and will go away whether you smoke or not. Do not smoke!  WHAT RESOURCES ARE AVAILABLE TO HELP ME QUIT SMOKING?  Your health care provider can direct you to community resources or hospitals for support, which may include:  · Group support.  · Education.  · Hypnosis.  · Therapy.     This information is not intended to replace advice given to you by your health care provider. Make sure you discuss any questions you have with your health care provider.     Document Released: 09/15/2005 Document Revised: 01/08/2016 Document Reviewed: 06/05/2014  [employer] Interactive Patient Education ©2016 [employer]

## 2021-01-06 NOTE — PROGRESS NOTES
"Cardiology Note    Chief Complaint   Patient presents with   • Chest Pain       History of Present Illness: Lorene Haro is a 45 y.o. 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    ED follow up for low level troponin.    Currently patient limited with exertion due to dyspnea. No other cardiac complaints. Can walk up to one block. This is done slowly. Sober 1 month from alcohol and \"crank.\" also quit smoking 1 week ago. \"I smell like smoke because people in my building smoke.\" Claims compliant with medications and denies adverse effects. Compliant with medical follow up. Compliant with cpap.     Review of Systems   Constitution: Negative for decreased appetite, fever, malaise/fatigue, weight gain and weight loss.   HENT: Negative for congestion and nosebleeds.    Eyes: Negative for blurred vision.   Cardiovascular: Positive for dyspnea on exertion. Negative for chest pain, claudication, irregular heartbeat, leg swelling, near-syncope, orthopnea, palpitations, paroxysmal nocturnal dyspnea and syncope.   Respiratory: Negative for cough and shortness of breath.    Endocrine: Negative for cold intolerance and heat intolerance.   Skin: Negative for rash.   Musculoskeletal: Negative for back pain.   Gastrointestinal: Negative for abdominal pain, constipation, diarrhea, heartburn, melena, nausea and vomiting.   Genitourinary: Negative for dysuria, flank pain and hematuria.   Neurological: Negative for dizziness.   Psychiatric/Behavioral: Negative for altered mental status and depression.         Past Medical History:   Diagnosis Date   • Asthma    • Bipolar 2 disorder (HCC)    • Chickenpox    • Chronic obstructive pulmonary disease (HCC)    • Hypertension    • Other psychological stress    • Schizophrenic disorder (HCC)          Past Surgical History:   Procedure Laterality Date   • CHOLECYSTECTOMY     • COLECTOMY     • " HYSTERECTOMY LAPAROSCOPY     • KNEE ARTHROSCOPY Left          Current Outpatient Medications   Medication Sig Dispense Refill   • aspirin EC (ECOTRIN) 81 MG Tablet Delayed Response Take 81 mg by mouth every day.     • miconazole (MICOTIN) 2 % Cream Apply 1 Inch topically 2 times a day. 35 g 0   • ibuprofen (MOTRIN) 800 MG Tab Take 1 Tab by mouth every 8 hours as needed for Moderate Pain or Inflammation. 30 Tab 0   • montelukast (SINGULAIR) 10 MG Tab Take 1 Tab by mouth every bedtime. 30 Tab 11   • albuterol 108 (90 Base) MCG/ACT Aero Soln inhalation aerosol Inhale 2 Puffs every 6 hours as needed for Shortness of Breath. 8.5 g 2   • atorvastatin (LIPITOR) 20 MG Tab Take 1 Tab by mouth every day. (Patient taking differently: Take 20 mg by mouth every bedtime.) 30 Tab 1   • tiotropium (SPIRIVA HANDIHALER) 18 MCG Cap Inhale 1 Cap by mouth every day. 30 Cap 3   • ipratropium-albuterol (DUONEB) 0.5-2.5 (3) MG/3ML nebulizer solution 3 mL by Nebulization route every 6 hours as needed for Shortness of Breath. 60 Each 1   • acetaminophen (TYLENOL) 325 MG Tab Take 2 Tabs by mouth every 6 hours as needed (Mild Pain; (Pain scale 1-3); Temp greater than 100.5 F). 30 Tab 0   • furosemide (LASIX) 20 MG Tab Take 1 Tab by mouth every day. 30 Tab 0   • lithium CR (LITHOBID) 300 MG Tab CR Take 300-600 mg by mouth 2 Times a Day. 300 mg in AM and 600 mg at bedtime     • aripiprazole (ABILIFY) 30 MG tablet Take 30 mg by mouth every morning.     • cephALEXin (KEFLEX) 500 MG Cap Take 1 Cap by mouth 4 times a day. (Patient not taking: Reported on 1/6/2021) 40 Cap 0   • azithromycin (ZITHROMAX) 250 MG Tab Take two tabs by mouth on day one, then one tab by mouth daily on days 2-5. (Patient not taking: Reported on 1/6/2021) 6 Tab 0   • benztropine (COGENTIN) 1 MG Tab Take 1 mg by mouth every bedtime.     • haloperidol (HALDOL) 5 MG Tab Take 5 mg by mouth every bedtime.       No current facility-administered medications for this visit.   "        Allergies   Allergen Reactions   • Amoxicillin Rash     Tolerates cephalosporins   • Penicillin G Itching         Family History   Problem Relation Age of Onset   • No Known Problems Mother    • Cancer Sister          Social History     Socioeconomic History   • Marital status: Single     Spouse name: Not on file   • Number of children: Not on file   • Years of education: Not on file   • Highest education level: Not on file   Occupational History   • Not on file   Social Needs   • Financial resource strain: Hard   • Food insecurity     Worry: Sometimes true     Inability: Often true   • Transportation needs     Medical: Yes     Non-medical: Yes   Tobacco Use   • Smoking status: Former Smoker     Packs/day: 1.00     Years: 20.00     Pack years: 20.00     Types: Cigarettes     Quit date: 2020     Years since quittin.0   • Smokeless tobacco: Never Used   • Tobacco comment: 19 states she quit last week   Substance and Sexual Activity   • Alcohol use: No   • Drug use: No   • Sexual activity: Not on file   Lifestyle   • Physical activity     Days per week: Not on file     Minutes per session: Not on file   • Stress: Not on file   Relationships   • Social connections     Talks on phone: Not on file     Gets together: Not on file     Attends [employer] service: Not on file     Active member of club or organization: Not on file     Attends meetings of clubs or organizations: Not on file     Relationship status: Not on file   • Intimate partner violence     Fear of current or ex partner: Not on file     Emotionally abused: Not on file     Physically abused: Not on file     Forced sexual activity: Not on file   Other Topics Concern   • Not on file   Social History Narrative    From [facility]         Physical Exam:  Ambulatory Vitals  /58 (BP Location: Left arm, Patient Position: Sitting, BP Cuff Size: Adult)   Pulse (!) 54   Resp 18   Ht 1.549 m (5' 1\")   Wt (!) 126.6 kg (279 lb)   SpO2 96%    BP " "Readings from Last 4 Encounters:   01/06/21 102/58   12/20/20 135/74   12/19/20 126/64   12/13/20 151/64     Weight/BMI:   Vitals:    01/06/21 1405   BP: 102/58   Weight: (!) 126.6 kg (279 lb)   Height: 1.549 m (5' 1\")    Body mass index is 52.72 kg/m².  Wt Readings from Last 4 Encounters:   01/06/21 (!) 126.6 kg (279 lb)   12/20/20 (!) 130.5 kg (287 lb 11.2 oz)   12/18/20 (!) 130.5 kg (287 lb 11.2 oz)   12/12/20 (!) 130.5 kg (287 lb 11.2 oz)       Physical Exam   Constitutional: She is oriented to person, place, and time and well-developed, well-nourished, and in no distress. No distress.   HENT:   Head: Normocephalic and atraumatic.   Eyes: Pupils are equal, round, and reactive to light. Conjunctivae are normal.   Neck: Normal range of motion. Neck supple. No JVD present.   Cardiovascular: Normal rate, regular rhythm, normal heart sounds and intact distal pulses. Exam reveals no gallop and no friction rub.   No murmur heard.  Pulmonary/Chest: Effort normal and breath sounds normal. No respiratory distress. She has no wheezes. She has no rales. She exhibits no tenderness.   Abdominal: Soft. Bowel sounds are normal. She exhibits no distension.   Musculoskeletal:         General: No edema.   Neurological: She is alert and oriented to person, place, and time.   Skin: Skin is warm and dry.   Psychiatric: Affect and judgment normal.       Lab Data Review:  Lab Results   Component Value Date/Time    CHOLSTRLTOT 138 11/14/2018 02:08 AM    LDL 76 11/14/2018 02:08 AM    HDL 41 11/14/2018 02:08 AM    TRIGLYCERIDE 106 11/14/2018 02:08 AM       Lab Results   Component Value Date/Time    SODIUM 143 12/19/2020 02:45 AM    POTASSIUM 4.5 12/19/2020 02:45 AM    CHLORIDE 107 12/19/2020 02:45 AM    CO2 30 12/19/2020 02:45 AM    GLUCOSE 98 12/19/2020 02:45 AM    BUN 9 12/19/2020 02:45 AM    CREATININE 1.17 12/19/2020 02:45 AM    CREATININE 0.9 09/11/2008 01:45 PM     CrCl cannot be calculated (Patient's most recent lab result is older " than the maximum 7 days allowed.).  Lab Results   Component Value Date/Time    ALKPHOSPHAT 94 12/19/2020 02:45 AM    ASTSGOT 7 (L) 12/19/2020 02:45 AM    ALTSGPT 12 12/19/2020 02:45 AM    TBILIRUBIN 0.4 12/19/2020 02:45 AM      Lab Results   Component Value Date/Time    WBC 12.1 (H) 12/19/2020 02:45 AM     Lab Results   Component Value Date/Time    HBA1C 6.1 (H) 11/13/2018 07:07 PM     No components found for: TROP      Cardiac Imaging and Procedures Review:      TTE 11/18/2019  CONCLUSIONS  Compared to the images of the study done on 11/14/2018 - there has been   no significant change.   Normal left ventricular systolic function.  Left ventricular ejection fraction is visually estimated to be 60%.  Normal diastolic function.  Normal right ventricular systolic function.  Mild mitral regurgitation.  Moderate tricuspid regurgitation.  Right ventricular systolic pressure is estimated to be 65 mmHg.    TTE 11/14/2018  CONCLUSIONS  No prior study is available for comparison.   Technically difficult study - adequate information is obtained.   Grossly normal left ventricular systolic function.  Left ventricular ejection fraction is visually estimated to be 65%.  Probable mild aortic stenosis.  Dilated inferior vena cava without   inspiratory collapse.  Estimated right ventricular systolic pressure is   60 mmHg.    Medical Decision Making:  Problem List Items Addressed This Visit     Pulmonary hypertension (HCC)    Relevant Orders    EKG (Completed)    Comp Metabolic Panel    HEMOGLOBIN A1C (Glycohemoglobin GHB Total/A1C with MBG Estimate)    LIPID PANEL    Troponin T    proBrain Natriuretic Peptide, NT    HIV AG/AB COMBO ASSAY SCREENING    D-DIMER    MARY COMPREHENSIVE PANEL    REFERRAL TO BARIATRIC SURGERY    Tobacco use    Class 3 severe obesity due to excess calories with body mass index (BMI) of 50.0 to 59.9 in adult (HCC)    Relevant Orders    REFERRAL TO BARIATRIC SURGERY        Dyspnea likely due to pHTN. Low level  troponin likely baseline and is a high risk feature. Repeat labs. Repeat TTE. If pressures elevated will complete further workup.     Severe morbid obesity - refer for bariatric surgery.    Tobacco use - discussed for 8 min. My guess is she hasn't really quit. Encouraged to continue to abstain and discussed dangers of smoking.     Messaged PMD and Pulm specialist for multi-specialty coordination of physical/pulm rehab and substance abuse counseling.     It was my pleasure to meet with Ms. Haro.

## 2021-01-08 DIAGNOSIS — I27.20 PULMONARY HYPERTENSION (HCC): ICD-10-CM

## 2021-01-08 NOTE — PROGRESS NOTES
Patient referred to pulmonary rehab: pulmonary hypertension, KATHIA with COPD overlap, history of asthma, pseudorestriction secondary to elevated BMI of 51. FVC is noted to be 63% on PFT obtained 5/21/20.

## 2021-01-08 NOTE — NUR
First contact with pt for discharge. Pt up dressed self, ambulates without 
assistance, steady gait. DC home in taxi/voucher with instruct and rx. Pt 
stable vs, no n/v. Pt verbalizes understanding of instruct and f/u. To return 
to ER if worse or concerns.

## 2021-01-13 ENCOUNTER — HOSPITAL ENCOUNTER (OUTPATIENT)
Dept: LAB | Facility: MEDICAL CENTER | Age: 46
End: 2021-01-13
Attending: INTERNAL MEDICINE
Payer: MEDICAID

## 2021-01-13 DIAGNOSIS — I27.20 PULMONARY HYPERTENSION (HCC): ICD-10-CM

## 2021-01-13 LAB
ALBUMIN SERPL BCP-MCNC: 4.2 G/DL (ref 3.2–4.9)
ALBUMIN/GLOB SERPL: 1.5 G/DL
ALP SERPL-CCNC: 97 U/L (ref 30–99)
ALT SERPL-CCNC: 13 U/L (ref 2–50)
ANION GAP SERPL CALC-SCNC: 8 MMOL/L (ref 7–16)
AST SERPL-CCNC: 11 U/L (ref 12–45)
BILIRUB SERPL-MCNC: 0.6 MG/DL (ref 0.1–1.5)
BUN SERPL-MCNC: 11 MG/DL (ref 8–22)
CALCIUM SERPL-MCNC: 9.6 MG/DL (ref 8.5–10.5)
CHLORIDE SERPL-SCNC: 104 MMOL/L (ref 96–112)
CHOLEST SERPL-MCNC: 167 MG/DL (ref 100–199)
CO2 SERPL-SCNC: 28 MMOL/L (ref 20–33)
CREAT SERPL-MCNC: 0.82 MG/DL (ref 0.5–1.4)
D DIMER PPP IA.FEU-MCNC: <0.27 UG/ML (FEU) (ref 0–0.5)
EST. AVERAGE GLUCOSE BLD GHB EST-MCNC: 105 MG/DL
FASTING STATUS PATIENT QL REPORTED: NORMAL
GLOBULIN SER CALC-MCNC: 2.8 G/DL (ref 1.9–3.5)
GLUCOSE SERPL-MCNC: 84 MG/DL (ref 65–99)
HBA1C MFR BLD: 5.3 % (ref 0–5.6)
HDLC SERPL-MCNC: 49 MG/DL
HIV 1+2 AB+HIV1 P24 AG SERPL QL IA: NORMAL
LDLC SERPL CALC-MCNC: 86 MG/DL
NT-PROBNP SERPL IA-MCNC: 315 PG/ML (ref 0–125)
POTASSIUM SERPL-SCNC: 3.8 MMOL/L (ref 3.6–5.5)
PROT SERPL-MCNC: 7 G/DL (ref 6–8.2)
SODIUM SERPL-SCNC: 140 MMOL/L (ref 135–145)
TRIGL SERPL-MCNC: 162 MG/DL (ref 0–149)
TROPONIN T SERPL-MCNC: 23 NG/L (ref 6–19)

## 2021-01-13 PROCEDURE — 86735 MUMPS ANTIBODY: CPT

## 2021-01-13 PROCEDURE — 84484 ASSAY OF TROPONIN QUANT: CPT

## 2021-01-13 PROCEDURE — 83036 HEMOGLOBIN GLYCOSYLATED A1C: CPT

## 2021-01-13 PROCEDURE — 83880 ASSAY OF NATRIURETIC PEPTIDE: CPT

## 2021-01-13 PROCEDURE — 86789 WEST NILE VIRUS ANTIBODY: CPT

## 2021-01-13 PROCEDURE — 86696 HERPES SIMPLEX TYPE 2 TEST: CPT

## 2021-01-13 PROCEDURE — 86788 WEST NILE VIRUS AB IGM: CPT

## 2021-01-13 PROCEDURE — 86694 HERPES SIMPLEX NES ANTBDY: CPT | Mod: 91

## 2021-01-13 PROCEDURE — 36415 COLL VENOUS BLD VENIPUNCTURE: CPT

## 2021-01-13 PROCEDURE — 86695 HERPES SIMPLEX TYPE 1 TEST: CPT

## 2021-01-13 PROCEDURE — 85379 FIBRIN DEGRADATION QUANT: CPT

## 2021-01-13 PROCEDURE — 86765 RUBEOLA ANTIBODY: CPT

## 2021-01-13 PROCEDURE — 86225 DNA ANTIBODY NATIVE: CPT

## 2021-01-13 PROCEDURE — 80061 LIPID PANEL: CPT

## 2021-01-13 PROCEDURE — 86235 NUCLEAR ANTIGEN ANTIBODY: CPT

## 2021-01-13 PROCEDURE — 86787 VARICELLA-ZOSTER ANTIBODY: CPT

## 2021-01-13 PROCEDURE — 80053 COMPREHEN METABOLIC PANEL: CPT

## 2021-01-13 PROCEDURE — 87389 HIV-1 AG W/HIV-1&-2 AB AG IA: CPT

## 2021-01-13 PROCEDURE — 83516 IMMUNOASSAY NONANTIBODY: CPT | Mod: 91

## 2021-01-15 LAB
DSDNA AB TITR SER CLIF: NORMAL {TITER}
HSV1 GG IGG SER-ACNC: 4.1 IV
HSV2 GG IGG SER-ACNC: 9.55 IV

## 2021-01-16 LAB
CHROMATIN IGG SERPL-ACNC: 7 UNITS (ref 0–19)
TEST NAME 95000: NORMAL

## 2021-01-17 LAB
CENTROMERE IGG TITR SER IF: 4 AU/ML (ref 0–40)
ENA JO1 AB TITR SER: 1 AU/ML (ref 0–40)
ENA SCL70 IGG SER QL: 3 AU/ML (ref 0–40)
ENA SM IGG SER-ACNC: 0 AU/ML (ref 0–40)
ENA SS-B IGG SER IA-ACNC: 12 AU/ML (ref 0–40)
HSV1+2 IGG SER IA-ACNC: >22.4 IV
HSV1+2 IGM SER IA-ACNC: 2.04 IV
MEV IGG SER IA-ACNC: 41.9 AU/ML
MEV IGM SER IA-ACNC: 0.19 AU (ref 0–0.79)
MUV IGG SER IA-ACNC: 222 AU/ML
MUV IGM SER IA-ACNC: 0.23 IV
SSA52 R0ENA AB IGG Q0420: 2 AU/ML (ref 0–40)
SSA60 R0ENA AB IGG Q0419: 0 AU/ML (ref 0–40)
U1 SNRNP IGG SER QL: 3 AU/ML (ref 0–40)
VZV IGG SER IA-ACNC: 3559 IV
VZV IGM SER IA-ACNC: 0.13 ISR
WNV IGG SER IA-ACNC: 0.13 IV
WNV IGM SER IA-ACNC: 0 IV

## 2021-01-17 PROCEDURE — 99285 EMERGENCY DEPT VISIT HI MDM: CPT

## 2021-01-17 PROCEDURE — 93005 ELECTROCARDIOGRAM TRACING: CPT

## 2021-01-17 PROCEDURE — 93005 ELECTROCARDIOGRAM TRACING: CPT | Performed by: EMERGENCY MEDICINE

## 2021-01-17 ASSESSMENT — FIBROSIS 4 INDEX: FIB4 SCORE: 0.53

## 2021-01-18 ENCOUNTER — APPOINTMENT (OUTPATIENT)
Dept: RADIOLOGY | Facility: MEDICAL CENTER | Age: 46
End: 2021-01-18
Attending: STUDENT IN AN ORGANIZED HEALTH CARE EDUCATION/TRAINING PROGRAM
Payer: MEDICAID

## 2021-01-18 ENCOUNTER — APPOINTMENT (OUTPATIENT)
Dept: RADIOLOGY | Facility: MEDICAL CENTER | Age: 46
End: 2021-01-18
Attending: EMERGENCY MEDICINE
Payer: MEDICAID

## 2021-01-18 ENCOUNTER — APPOINTMENT (OUTPATIENT)
Dept: CARDIOLOGY | Facility: MEDICAL CENTER | Age: 46
End: 2021-01-18
Attending: STUDENT IN AN ORGANIZED HEALTH CARE EDUCATION/TRAINING PROGRAM
Payer: MEDICAID

## 2021-01-18 ENCOUNTER — HOSPITAL ENCOUNTER (OUTPATIENT)
Facility: MEDICAL CENTER | Age: 46
End: 2021-01-19
Attending: EMERGENCY MEDICINE | Admitting: STUDENT IN AN ORGANIZED HEALTH CARE EDUCATION/TRAINING PROGRAM
Payer: MEDICAID

## 2021-01-18 DIAGNOSIS — R07.9 CHEST PAIN, UNSPECIFIED TYPE: ICD-10-CM

## 2021-01-18 PROBLEM — F20.9 SCHIZOPHRENIA (HCC): Status: ACTIVE | Noted: 2021-01-18

## 2021-01-18 LAB
ALBUMIN SERPL BCP-MCNC: 3.9 G/DL (ref 3.2–4.9)
ALBUMIN/GLOB SERPL: 1.1 G/DL
ALP SERPL-CCNC: 104 U/L (ref 30–99)
ALT SERPL-CCNC: 12 U/L (ref 2–50)
ANION GAP SERPL CALC-SCNC: 12 MMOL/L (ref 7–16)
APPEARANCE UR: CLEAR
AST SERPL-CCNC: 11 U/L (ref 12–45)
BACTERIA #/AREA URNS HPF: NEGATIVE /HPF
BASOPHILS # BLD AUTO: 0.2 % (ref 0–1.8)
BASOPHILS # BLD: 0.03 K/UL (ref 0–0.12)
BILIRUB SERPL-MCNC: 0.5 MG/DL (ref 0.1–1.5)
BILIRUB UR QL STRIP.AUTO: NEGATIVE
BUN SERPL-MCNC: 13 MG/DL (ref 8–22)
CALCIUM SERPL-MCNC: 9.2 MG/DL (ref 8.5–10.5)
CHLORIDE SERPL-SCNC: 100 MMOL/L (ref 96–112)
CO2 SERPL-SCNC: 22 MMOL/L (ref 20–33)
COLOR UR: YELLOW
CREAT SERPL-MCNC: 0.92 MG/DL (ref 0.5–1.4)
EKG IMPRESSION: NORMAL
EKG IMPRESSION: NORMAL
EOSINOPHIL # BLD AUTO: 0.17 K/UL (ref 0–0.51)
EOSINOPHIL NFR BLD: 1.1 % (ref 0–6.9)
EPI CELLS #/AREA URNS HPF: NORMAL /HPF
ERYTHROCYTE [DISTWIDTH] IN BLOOD BY AUTOMATED COUNT: 52 FL (ref 35.9–50)
EST. AVERAGE GLUCOSE BLD GHB EST-MCNC: 103 MG/DL
GLOBULIN SER CALC-MCNC: 3.4 G/DL (ref 1.9–3.5)
GLUCOSE SERPL-MCNC: 107 MG/DL (ref 65–99)
GLUCOSE UR STRIP.AUTO-MCNC: NEGATIVE MG/DL
HBA1C MFR BLD: 5.2 % (ref 0–5.6)
HCT VFR BLD AUTO: 40.8 % (ref 37–47)
HGB BLD-MCNC: 12.5 G/DL (ref 12–16)
HYALINE CASTS #/AREA URNS LPF: NORMAL /LPF
IMM GRANULOCYTES # BLD AUTO: 0.06 K/UL (ref 0–0.11)
IMM GRANULOCYTES NFR BLD AUTO: 0.4 % (ref 0–0.9)
KETONES UR STRIP.AUTO-MCNC: NEGATIVE MG/DL
LEUKOCYTE ESTERASE UR QL STRIP.AUTO: ABNORMAL
LV EJECT FRACT  99904: 60
LV EJECT FRACT MOD 2C 99903: 49.57
LV EJECT FRACT MOD 4C 99902: 57.25
LV EJECT FRACT MOD BP 99901: 52.38
LYMPHOCYTES # BLD AUTO: 1.98 K/UL (ref 1–4.8)
LYMPHOCYTES NFR BLD: 12.8 % (ref 22–41)
MCH RBC QN AUTO: 29.6 PG (ref 27–33)
MCHC RBC AUTO-ENTMCNC: 30.6 G/DL (ref 33.6–35)
MCV RBC AUTO: 96.5 FL (ref 81.4–97.8)
MICRO URNS: ABNORMAL
MONOCYTES # BLD AUTO: 0.85 K/UL (ref 0–0.85)
MONOCYTES NFR BLD AUTO: 5.5 % (ref 0–13.4)
NEUTROPHILS # BLD AUTO: 12.41 K/UL (ref 2–7.15)
NEUTROPHILS NFR BLD: 80 % (ref 44–72)
NITRITE UR QL STRIP.AUTO: NEGATIVE
NRBC # BLD AUTO: 0 K/UL
NRBC BLD-RTO: 0 /100 WBC
PH UR STRIP.AUTO: 5.5 [PH] (ref 5–8)
PLATELET # BLD AUTO: 278 K/UL (ref 164–446)
PMV BLD AUTO: 11 FL (ref 9–12.9)
POTASSIUM SERPL-SCNC: 4 MMOL/L (ref 3.6–5.5)
PROCALCITONIN SERPL-MCNC: <0.05 NG/ML
PROT SERPL-MCNC: 7.3 G/DL (ref 6–8.2)
PROT UR QL STRIP: NEGATIVE MG/DL
RBC # BLD AUTO: 4.23 M/UL (ref 4.2–5.4)
RBC # URNS HPF: NORMAL /HPF
RBC UR QL AUTO: NEGATIVE
SARS-COV-2 RNA RESP QL NAA+PROBE: NOTDETECTED
SODIUM SERPL-SCNC: 134 MMOL/L (ref 135–145)
SP GR UR STRIP.AUTO: 1.01
SPECIMEN SOURCE: NORMAL
TROPONIN T SERPL-MCNC: 16 NG/L (ref 6–19)
TROPONIN T SERPL-MCNC: 16 NG/L (ref 6–19)
UROBILINOGEN UR STRIP.AUTO-MCNC: 0.2 MG/DL
WBC # BLD AUTO: 15.5 K/UL (ref 4.8–10.8)
WBC #/AREA URNS HPF: NORMAL /HPF

## 2021-01-18 PROCEDURE — G0378 HOSPITAL OBSERVATION PER HR: HCPCS

## 2021-01-18 PROCEDURE — A9502 TC99M TETROFOSMIN: HCPCS

## 2021-01-18 PROCEDURE — 83036 HEMOGLOBIN GLYCOSYLATED A1C: CPT

## 2021-01-18 PROCEDURE — 93005 ELECTROCARDIOGRAM TRACING: CPT | Mod: XE | Performed by: STUDENT IN AN ORGANIZED HEALTH CARE EDUCATION/TRAINING PROGRAM

## 2021-01-18 PROCEDURE — 84484 ASSAY OF TROPONIN QUANT: CPT

## 2021-01-18 PROCEDURE — 94760 N-INVAS EAR/PLS OXIMETRY 1: CPT

## 2021-01-18 PROCEDURE — 85025 COMPLETE CBC W/AUTO DIFF WBC: CPT

## 2021-01-18 PROCEDURE — U0003 INFECTIOUS AGENT DETECTION BY NUCLEIC ACID (DNA OR RNA); SEVERE ACUTE RESPIRATORY SYNDROME CORONAVIRUS 2 (SARS-COV-2) (CORONAVIRUS DISEASE [COVID-19]), AMPLIFIED PROBE TECHNIQUE, MAKING USE OF HIGH THROUGHPUT TECHNOLOGIES AS DESCRIBED BY CMS-2020-01-R: HCPCS

## 2021-01-18 PROCEDURE — A9270 NON-COVERED ITEM OR SERVICE: HCPCS | Performed by: STUDENT IN AN ORGANIZED HEALTH CARE EDUCATION/TRAINING PROGRAM

## 2021-01-18 PROCEDURE — 96372 THER/PROPH/DIAG INJ SC/IM: CPT | Mod: XU

## 2021-01-18 PROCEDURE — 700111 HCHG RX REV CODE 636 W/ 250 OVERRIDE (IP): Performed by: STUDENT IN AN ORGANIZED HEALTH CARE EDUCATION/TRAINING PROGRAM

## 2021-01-18 PROCEDURE — 87040 BLOOD CULTURE FOR BACTERIA: CPT

## 2021-01-18 PROCEDURE — 80053 COMPREHEN METABOLIC PANEL: CPT

## 2021-01-18 PROCEDURE — 84145 PROCALCITONIN (PCT): CPT

## 2021-01-18 PROCEDURE — 700111 HCHG RX REV CODE 636 W/ 250 OVERRIDE (IP)

## 2021-01-18 PROCEDURE — 700102 HCHG RX REV CODE 250 W/ 637 OVERRIDE(OP): Performed by: STUDENT IN AN ORGANIZED HEALTH CARE EDUCATION/TRAINING PROGRAM

## 2021-01-18 PROCEDURE — 81001 URINALYSIS AUTO W/SCOPE: CPT

## 2021-01-18 PROCEDURE — 99220 PR INITIAL OBSERVATION CARE,LEVL III: CPT | Performed by: STUDENT IN AN ORGANIZED HEALTH CARE EDUCATION/TRAINING PROGRAM

## 2021-01-18 PROCEDURE — 93010 ELECTROCARDIOGRAM REPORT: CPT | Performed by: INTERNAL MEDICINE

## 2021-01-18 PROCEDURE — 96372 THER/PROPH/DIAG INJ SC/IM: CPT

## 2021-01-18 PROCEDURE — U0005 INFEC AGEN DETEC AMPLI PROBE: HCPCS

## 2021-01-18 PROCEDURE — 93306 TTE W/DOPPLER COMPLETE: CPT

## 2021-01-18 PROCEDURE — 71045 X-RAY EXAM CHEST 1 VIEW: CPT

## 2021-01-18 PROCEDURE — 93306 TTE W/DOPPLER COMPLETE: CPT | Mod: 26 | Performed by: INTERNAL MEDICINE

## 2021-01-18 PROCEDURE — 94660 CPAP INITIATION&MGMT: CPT

## 2021-01-18 RX ORDER — ACETAMINOPHEN 325 MG/1
650 TABLET ORAL EVERY 6 HOURS PRN
Status: DISCONTINUED | OUTPATIENT
Start: 2021-01-18 | End: 2021-01-19

## 2021-01-18 RX ORDER — POLYETHYLENE GLYCOL 3350 17 G/17G
1 POWDER, FOR SOLUTION ORAL
Status: DISCONTINUED | OUTPATIENT
Start: 2021-01-18 | End: 2021-01-19 | Stop reason: HOSPADM

## 2021-01-18 RX ORDER — BENZTROPINE MESYLATE 1 MG/1
1 TABLET ORAL
Status: DISCONTINUED | OUTPATIENT
Start: 2021-01-18 | End: 2021-01-19 | Stop reason: HOSPADM

## 2021-01-18 RX ORDER — ASPIRIN 325 MG
325 TABLET ORAL DAILY
Status: DISCONTINUED | OUTPATIENT
Start: 2021-01-18 | End: 2021-01-19 | Stop reason: HOSPADM

## 2021-01-18 RX ORDER — PROMETHAZINE HYDROCHLORIDE 25 MG/1
12.5-25 SUPPOSITORY RECTAL EVERY 4 HOURS PRN
Status: DISCONTINUED | OUTPATIENT
Start: 2021-01-18 | End: 2021-01-19 | Stop reason: HOSPADM

## 2021-01-18 RX ORDER — HALOPERIDOL 5 MG/1
5 TABLET ORAL
Status: DISCONTINUED | OUTPATIENT
Start: 2021-01-18 | End: 2021-01-19 | Stop reason: HOSPADM

## 2021-01-18 RX ORDER — LITHIUM CARBONATE 300 MG/1
300 TABLET, FILM COATED, EXTENDED RELEASE ORAL EVERY MORNING
Status: DISCONTINUED | OUTPATIENT
Start: 2021-01-18 | End: 2021-01-19 | Stop reason: HOSPADM

## 2021-01-18 RX ORDER — ONDANSETRON 4 MG/1
4 TABLET, ORALLY DISINTEGRATING ORAL EVERY 4 HOURS PRN
Status: DISCONTINUED | OUTPATIENT
Start: 2021-01-18 | End: 2021-01-19 | Stop reason: HOSPADM

## 2021-01-18 RX ORDER — ATORVASTATIN CALCIUM 20 MG/1
20 TABLET, FILM COATED ORAL
Status: DISCONTINUED | OUTPATIENT
Start: 2021-01-18 | End: 2021-01-19 | Stop reason: HOSPADM

## 2021-01-18 RX ORDER — ONDANSETRON 2 MG/ML
4 INJECTION INTRAMUSCULAR; INTRAVENOUS EVERY 4 HOURS PRN
Status: DISCONTINUED | OUTPATIENT
Start: 2021-01-18 | End: 2021-01-19 | Stop reason: HOSPADM

## 2021-01-18 RX ORDER — PROMETHAZINE HYDROCHLORIDE 25 MG/1
12.5-25 TABLET ORAL EVERY 4 HOURS PRN
Status: DISCONTINUED | OUTPATIENT
Start: 2021-01-18 | End: 2021-01-19 | Stop reason: HOSPADM

## 2021-01-18 RX ORDER — IPRATROPIUM BROMIDE AND ALBUTEROL SULFATE 2.5; .5 MG/3ML; MG/3ML
3 SOLUTION RESPIRATORY (INHALATION)
Status: DISCONTINUED | OUTPATIENT
Start: 2021-01-18 | End: 2021-01-19 | Stop reason: HOSPADM

## 2021-01-18 RX ORDER — HYDROCODONE BITARTRATE AND ACETAMINOPHEN 5; 325 MG/1; MG/1
1-2 TABLET ORAL EVERY 6 HOURS PRN
Status: DISCONTINUED | OUTPATIENT
Start: 2021-01-18 | End: 2021-01-19 | Stop reason: HOSPADM

## 2021-01-18 RX ORDER — LITHIUM CARBONATE 300 MG/1
600 TABLET, FILM COATED, EXTENDED RELEASE ORAL EVERY EVENING
Status: DISCONTINUED | OUTPATIENT
Start: 2021-01-18 | End: 2021-01-19 | Stop reason: HOSPADM

## 2021-01-18 RX ORDER — REGADENOSON 0.08 MG/ML
0.4 INJECTION, SOLUTION INTRAVENOUS ONCE
Status: COMPLETED | OUTPATIENT
Start: 2021-01-18 | End: 2021-01-18

## 2021-01-18 RX ORDER — ASPIRIN 81 MG/1
324 TABLET, CHEWABLE ORAL DAILY
Status: DISCONTINUED | OUTPATIENT
Start: 2021-01-18 | End: 2021-01-19 | Stop reason: HOSPADM

## 2021-01-18 RX ORDER — ASPIRIN 300 MG/1
300 SUPPOSITORY RECTAL DAILY
Status: DISCONTINUED | OUTPATIENT
Start: 2021-01-18 | End: 2021-01-19 | Stop reason: HOSPADM

## 2021-01-18 RX ORDER — REGADENOSON 0.08 MG/ML
INJECTION, SOLUTION INTRAVENOUS
Status: COMPLETED
Start: 2021-01-18 | End: 2021-01-18

## 2021-01-18 RX ORDER — LABETALOL HYDROCHLORIDE 5 MG/ML
10 INJECTION, SOLUTION INTRAVENOUS EVERY 4 HOURS PRN
Status: DISCONTINUED | OUTPATIENT
Start: 2021-01-18 | End: 2021-01-19 | Stop reason: HOSPADM

## 2021-01-18 RX ORDER — FUROSEMIDE 20 MG/1
20 TABLET ORAL DAILY
Status: DISCONTINUED | OUTPATIENT
Start: 2021-01-18 | End: 2021-01-19 | Stop reason: HOSPADM

## 2021-01-18 RX ORDER — AMOXICILLIN 250 MG
2 CAPSULE ORAL 2 TIMES DAILY
Status: DISCONTINUED | OUTPATIENT
Start: 2021-01-18 | End: 2021-01-19 | Stop reason: HOSPADM

## 2021-01-18 RX ORDER — MONTELUKAST SODIUM 10 MG/1
10 TABLET ORAL
Status: DISCONTINUED | OUTPATIENT
Start: 2021-01-18 | End: 2021-01-19 | Stop reason: HOSPADM

## 2021-01-18 RX ORDER — ARIPIPRAZOLE 15 MG/1
30 TABLET ORAL EVERY MORNING
Status: DISCONTINUED | OUTPATIENT
Start: 2021-01-18 | End: 2021-01-19 | Stop reason: HOSPADM

## 2021-01-18 RX ORDER — BISACODYL 10 MG
10 SUPPOSITORY, RECTAL RECTAL
Status: DISCONTINUED | OUTPATIENT
Start: 2021-01-18 | End: 2021-01-19 | Stop reason: HOSPADM

## 2021-01-18 RX ADMIN — GLYCOPYRROLATE 1 CAPSULE: 15.6 CAPSULE RESPIRATORY (INHALATION) at 05:53

## 2021-01-18 RX ADMIN — ATORVASTATIN CALCIUM 20 MG: 20 TABLET, FILM COATED ORAL at 22:18

## 2021-01-18 RX ADMIN — HALOPERIDOL 5 MG: 5 TABLET ORAL at 05:41

## 2021-01-18 RX ADMIN — DOCUSATE SODIUM 50 MG AND SENNOSIDES 8.6 MG 2 TABLET: 8.6; 5 TABLET, FILM COATED ORAL at 05:48

## 2021-01-18 RX ADMIN — FUROSEMIDE 20 MG: 40 TABLET ORAL at 05:48

## 2021-01-18 RX ADMIN — REGADENOSON 0.4 MG: 0.08 INJECTION, SOLUTION INTRAVENOUS at 10:30

## 2021-01-18 RX ADMIN — BENZTROPINE MESYLATE 1 MG: 1 TABLET ORAL at 05:42

## 2021-01-18 RX ADMIN — ENOXAPARIN SODIUM 40 MG: 40 INJECTION SUBCUTANEOUS at 05:48

## 2021-01-18 RX ADMIN — ARIPIPRAZOLE 30 MG: 15 TABLET ORAL at 05:41

## 2021-01-18 RX ADMIN — HALOPERIDOL 5 MG: 5 TABLET ORAL at 22:18

## 2021-01-18 RX ADMIN — LITHIUM CARBONATE 300 MG: 300 TABLET, FILM COATED, EXTENDED RELEASE ORAL at 05:42

## 2021-01-18 RX ADMIN — LITHIUM CARBONATE 600 MG: 300 TABLET, FILM COATED, EXTENDED RELEASE ORAL at 19:42

## 2021-01-18 RX ADMIN — ENOXAPARIN SODIUM 60 MG: 60 INJECTION SUBCUTANEOUS at 18:13

## 2021-01-18 RX ADMIN — MONTELUKAST 10 MG: 10 TABLET, FILM COATED ORAL at 22:18

## 2021-01-18 RX ADMIN — ASPIRIN 325 MG: 325 TABLET, FILM COATED ORAL at 05:48

## 2021-01-18 RX ADMIN — GLYCOPYRROLATE 1 CAPSULE: 15.6 CAPSULE RESPIRATORY (INHALATION) at 22:18

## 2021-01-18 RX ADMIN — BENZTROPINE MESYLATE 1 MG: 1 TABLET ORAL at 22:17

## 2021-01-18 ASSESSMENT — COGNITIVE AND FUNCTIONAL STATUS - GENERAL
DAILY ACTIVITIY SCORE: 23
MOVING TO AND FROM BED TO CHAIR: A LITTLE
WALKING IN HOSPITAL ROOM: A LITTLE
DRESSING REGULAR LOWER BODY CLOTHING: A LITTLE
MOVING FROM LYING ON BACK TO SITTING ON SIDE OF FLAT BED: A LITTLE
WALKING IN HOSPITAL ROOM: A LITTLE
MOBILITY SCORE: 19
SUGGESTED CMS G CODE MODIFIER DAILY ACTIVITY: CI
SUGGESTED CMS G CODE MODIFIER MOBILITY: CK
SUGGESTED CMS G CODE MODIFIER MOBILITY: CK
DAILY ACTIVITIY SCORE: 23
SUGGESTED CMS G CODE MODIFIER DAILY ACTIVITY: CI
DRESSING REGULAR LOWER BODY CLOTHING: A LITTLE
MOVING TO AND FROM BED TO CHAIR: A LITTLE
MOVING FROM LYING ON BACK TO SITTING ON SIDE OF FLAT BED: A LITTLE
STANDING UP FROM CHAIR USING ARMS: A LITTLE
MOBILITY SCORE: 18
TURNING FROM BACK TO SIDE WHILE IN FLAT BAD: A LITTLE
CLIMB 3 TO 5 STEPS WITH RAILING: A LITTLE
TURNING FROM BACK TO SIDE WHILE IN FLAT BAD: A LITTLE
CLIMB 3 TO 5 STEPS WITH RAILING: A LITTLE

## 2021-01-18 ASSESSMENT — LIFESTYLE VARIABLES
AVERAGE NUMBER OF DAYS PER WEEK YOU HAVE A DRINK CONTAINING ALCOHOL: 0
TOTAL SCORE: 0
CONSUMPTION TOTAL: NEGATIVE
TOTAL SCORE: 0
TOTAL SCORE: 0
DOES PATIENT WANT TO STOP DRINKING: CANNOT ASSESS
EVER FELT BAD OR GUILTY ABOUT YOUR DRINKING: NO
ALCOHOL_USE: NO
ON A TYPICAL DAY WHEN YOU DRINK ALCOHOL HOW MANY DRINKS DO YOU HAVE: 0
HAVE PEOPLE ANNOYED YOU BY CRITICIZING YOUR DRINKING: NO
SUBSTANCE_ABUSE: 1
HOW MANY TIMES IN THE PAST YEAR HAVE YOU HAD 5 OR MORE DRINKS IN A DAY: 0
HAVE YOU EVER FELT YOU SHOULD CUT DOWN ON YOUR DRINKING: NO
EVER HAD A DRINK FIRST THING IN THE MORNING TO STEADY YOUR NERVES TO GET RID OF A HANGOVER: NO

## 2021-01-18 ASSESSMENT — ENCOUNTER SYMPTOMS
COUGH: 0
FEVER: 0
FALLS: 0
FOCAL WEAKNESS: 0
DIZZINESS: 1
NERVOUS/ANXIOUS: 1
PHOTOPHOBIA: 0
SINUS PAIN: 0
SEIZURES: 0
NAUSEA: 0
WHEEZING: 1
CHILLS: 0
SHORTNESS OF BREATH: 1
ABDOMINAL PAIN: 0
BACK PAIN: 1
VOMITING: 0
EYE PAIN: 0

## 2021-01-18 ASSESSMENT — FIBROSIS 4 INDEX: FIB4 SCORE: 0.54

## 2021-01-18 ASSESSMENT — PAIN DESCRIPTION - PAIN TYPE: TYPE: ACUTE PAIN

## 2021-01-18 NOTE — ASSESSMENT & PLAN NOTE
-Multiple cardiac risk factors, is going through cardiac work-up as outpatient has not had stress test.  -Aspirin given, admit to telemetry, trend troponin  -Stress test in the a.m.  -Echocardiogram  -Repeat EKG if chest pain returns  -Risk stratify with A1c and lipid panel

## 2021-01-18 NOTE — ED PROVIDER NOTES
ED Provider Note    CHIEF COMPLAINT  Chief Complaint   Patient presents with   • Chest Pain   • Dizziness       HPI  Lorene Haro is a 45 y.o. female who presents to the emergency department complaining of chest pain and lightheaded dizziness. Past medical history is documented below. Yesterday she was volunteering including a a facility when she had onset of chest discomfort which have been persistent throughout the evening. She states that she was here within the last month of summer chest pain related to her back and was sent to outpatient cardiology which upon that appointment completed blood work and were going to have further follow-up.  Now complaining of persistent pressure throughout the day. She said that she has stopped smoking since that last visit. She is also been trying to lose weight as directed as well.    REVIEW OF SYSTEMS  See HPI for further details. All other systems are negative.     PAST MEDICAL HISTORY   has a past medical history of Asthma, Bipolar 2 disorder (HCC), Chickenpox, Chronic obstructive pulmonary disease (HCC), Hypertension, Other psychological stress, and Schizophrenic disorder (HCC).    SOCIAL HISTORY  Social History     Tobacco Use   • Smoking status: Former Smoker     Packs/day: 1.00     Years: 20.00     Pack years: 20.00     Types: Cigarettes     Quit date: 2020     Years since quittin.0   • Smokeless tobacco: Never Used   • Tobacco comment: 19 states she quit last week   Substance and Sexual Activity   • Alcohol use: No   • Drug use: No   • Sexual activity: Not on file       SURGICAL HISTORY   has a past surgical history that includes hysterectomy laparoscopy; colectomy; cholecystectomy; and knee arthroscopy (Left).    CURRENT MEDICATIONS  Home Medications    **Home medications have not yet been reviewed for this encounter**         ALLERGIES  Allergies   Allergen Reactions   • Amoxicillin Rash     Tolerates cephalosporins   • Penicillin G Itching  "      PHYSICAL EXAM  VITAL SIGNS: BP (!) 163/92   Pulse 78   Temp 36.1 °C (96.9 °F) (Tympanic)   Resp (!) 22   Ht 1.549 m (5' 1\")   Wt (!) 126.6 kg (279 lb)   LMP  (LMP Unknown)   SpO2 95%   BMI 52.72 kg/m²  @ALISSON[[id number]::@   Pulse ox interpretation: I interpret this pulse ox as normal.  Constitutional: Alert in no apparent distress. Mildly unkempt  HENT: No signs of trauma, Bilateral external ears normal, Nose normal.   Eyes: Pupils are equal and reactive  Neck: Normal range of motion, No tenderness, Supple  Cardiovascular: Regular rate and rhythm, no murmurs.   Thorax & Lungs: Normal breath sounds, No respiratory distress, No wheezing, No chest tenderness.   Abdomen: overweight, bowel sounds normal, Soft, No tenderness, No masses, No pulsatile masses. No peritoneal signs.  Skin: Warm, Dry, No erythema, No rash.   Extremities: Intact distal pulses, No edema, No tenderness  Musculoskeletal: Good range of motion in all major joints. No tenderness to palpation or major deformities noted.   Neurologic: Alert , Normal motor function, Normal sensory function, No focal deficits noted.   Psychiatric: Affect normal, Judgment normal, Mood normal.       DIAGNOSTIC STUDIES / PROCEDURES    EKG  Results for orders placed or performed during the hospital encounter of 21   EKG   Result Value Ref Range    Report       [facility] Emergency Dept.    Test Date:  2021  Pt Name:    ADELINA MILLAN                Department: ER  MRN:        [id number]                      Room:  Gender:     Female                       Technician: [id number]  :        1975                   Requested By:ER TRIAGE PROTOCOL  Order #:    [id number]                    Shar MD: Juan Ann    Measurements  Intervals                                Axis  Rate:       72                           P:          71  KY:         156                          QRS:        51  QRSD:       104                          T:      "     9  QT:         396  QTc:        434    Interpretive Statements  SINUS RHYTHM  ATRIAL PREMATURE COMPLEX  Compared to ECG 01/06/2021 14:14:59  Atrial premature complex(es) now present  Sinus bradycardia no longer present  Electronically Signed On 1- 1:37:17 PST by Juan Ann           LABS  Results for orders placed or performed during the hospital encounter of 01/18/21   CBC with Differential   Result Value Ref Range    WBC 15.5 (H) 4.8 - 10.8 K/uL    RBC 4.23 4.20 - 5.40 M/uL    Hemoglobin 12.5 12.0 - 16.0 g/dL    Hematocrit 40.8 37.0 - 47.0 %    MCV 96.5 81.4 - 97.8 fL    MCH 29.6 27.0 - 33.0 pg    MCHC 30.6 (L) 33.6 - 35.0 g/dL    RDW 52.0 (H) 35.9 - 50.0 fL    Platelet Count 278 164 - 446 K/uL    MPV 11.0 9.0 - 12.9 fL    Neutrophils-Polys 80.00 (H) 44.00 - 72.00 %    Lymphocytes 12.80 (L) 22.00 - 41.00 %    Monocytes 5.50 0.00 - 13.40 %    Eosinophils 1.10 0.00 - 6.90 %    Basophils 0.20 0.00 - 1.80 %    Immature Granulocytes 0.40 0.00 - 0.90 %    Nucleated RBC 0.00 /100 WBC    Neutrophils (Absolute) 12.41 (H) 2.00 - 7.15 K/uL    Lymphs (Absolute) 1.98 1.00 - 4.80 K/uL    Monos (Absolute) 0.85 0.00 - 0.85 K/uL    Eos (Absolute) 0.17 0.00 - 0.51 K/uL    Baso (Absolute) 0.03 0.00 - 0.12 K/uL    Immature Granulocytes (abs) 0.06 0.00 - 0.11 K/uL    NRBC (Absolute) 0.00 K/uL   Complete Metabolic Panel (CMP)   Result Value Ref Range    Sodium 134 (L) 135 - 145 mmol/L    Potassium 4.0 3.6 - 5.5 mmol/L    Chloride 100 96 - 112 mmol/L    Co2 22 20 - 33 mmol/L    Anion Gap 12.0 7.0 - 16.0    Glucose 107 (H) 65 - 99 mg/dL    Bun 13 8 - 22 mg/dL    Creatinine 0.92 0.50 - 1.40 mg/dL    Calcium 9.2 8.5 - 10.5 mg/dL    AST(SGOT) 11 (L) 12 - 45 U/L    ALT(SGPT) 12 2 - 50 U/L    Alkaline Phosphatase 104 (H) 30 - 99 U/L    Total Bilirubin 0.5 0.1 - 1.5 mg/dL    Albumin 3.9 3.2 - 4.9 g/dL    Total Protein 7.3 6.0 - 8.2 g/dL    Globulin 3.4 1.9 - 3.5 g/dL    A-G Ratio 1.1 g/dL   Troponin   Result Value Ref Range     Troponin T 16 6 - 19 ng/L   SARS-CoV-2 PCR (24 hour In-House): Collect NP swab in VTM    Specimen: Nasopharyngeal; Respirate   Result Value Ref Range    SARS-CoV-2 Source NP Swab    ESTIMATED GFR   Result Value Ref Range    GFR If African American >60 >60 mL/min/1.73 m 2    GFR If Non African American >60 >60 mL/min/1.73 m 2   EKG   Result Value Ref Range    Report       [facility] Emergency Dept.    Test Date:  2021  Pt Name:    ADELINA MILLAN                Department: ER  MRN:        [id number]                      Room:  Gender:     Female                       Technician: [id number]  :        1975                   Requested By:ER TRIAGE PROTOCOL  Order #:    [id number]                    Reading MD: Juan Ann    Measurements  Intervals                                Axis  Rate:       72                           P:          71  [place]:         156                          QRS:        51  QRSD:       104                          T:          9  QT:         396  QTc:        434    Interpretive Statements  SINUS RHYTHM  ATRIAL PREMATURE COMPLEX  Compared to ECG 2021 14:14:59  Atrial premature complex(es) now present  Sinus bradycardia no longer present  Electronically Signed On 2021 1:37:17 PST by Juan Ann         RADIOLOGY  DX-CHEST-PORTABLE (1 VIEW)   Final Result         1.  No focal infiltrates.   2.  Cardiomegaly              COURSE & MEDICAL DECISION MAKING  Pertinent Labs & Imaging studies reviewed. (See chart for details)  patient presented emergency department with recurrent chest pain. Today with exertion and not abating. Patient did have recent cardiology follow-up I do believe it'll be prudent for ongoing inpatient ACS evaluation today given her overall clinical presentation with a moderate heart score of four. Troponin currently is negative. EKG with nonspecific changes. I discussed case with hospitals which agreeable ongoing inpatient care.        FINAL  IMPRESSION  1. Chest pain, unspecified type            Electronically signed by: Juan Ann M.D., 1/18/2021 1:34 AM

## 2021-01-18 NOTE — PROGRESS NOTES
"46 yo F with PMHx significant for KATHIA on CPAP, COPD, obesity hypoventilation syndrome, bipolar 2/schizophrenia, tobacco use, substance abuse alcohol and \"crank\" who presented 1/18/2021 with complaints of chest pain, dizziness.    Lab remarkable for wbc 15.5. Otherwise trop trending neg. Chest x-ray negative for focal infiltrate, cardiomegaly present.      Trop trending neg;   Stress test: No evidence of significant jeopardized viable myocardium or prior myocardial infarction. Normal left ventricular size, ejection fraction, and wall motion.  TTE still pending.    Still complains 8/10 chest pain, radiating to back, improved with position. Her d-dimer was normal on 1/13.     Plans  1. TTE pending  2. Cont tele monitoring  3. Check procalcitonin, blood culture. Monitoring cbc. Will hold on antibiotics at this point.         "

## 2021-01-18 NOTE — ED NOTES
Med rec updated and complete  Allergies reviewed  Pt reports that she is using her MICONAZOLE 2% cream as needed (Under her breast)   Pt reports no antibiotics in the last 2 weeks

## 2021-01-18 NOTE — H&P
"Hospital Medicine History & Physical Note    Date of Service  1/18/2021    Primary Care Physician  SHANKAR Gleason    Consultants  None    Code Status  Full Code    Chief Complaint  Chief Complaint   Patient presents with   • Chest Pain   • Dizziness       History of Presenting Illness  45 y.o. female past medical history of KATHIA on CPAP, COPD, obesity hypoventilation syndrome, bipolar 2/schizophrenia, tobacco use, substance abuse alcohol and \"crank\" who presented 1/18/2021 with complaints of chest pain, dizziness.    Ms. Haro has been having intermittent chest pain generally brought about with activity.  Patient reports today she was performing volunteer work and exerting herself and started having chest and back pain as well as dyspnea.  Chest pain progressed throughout the evening and due to it not resolving she came to the ED for evaluation.  Patient denies recent illnesses such as fevers or chills, productive cough, dysuria, diarrhea, abdominal pain, nausea or vomiting.    Patient's initial work-up reveals /92, heart rate 75, respiratory rate 25, oxygen saturation 95% on room air, the patient was afebrile.  Blood work showed leukocytosis 15.5, sodium 134, normal renal and liver function, initial troponin T is 16, EKG shows sinus rhythm with premature atrial complexes, no ST elevation or depression.  Chest x-ray negative for focal infiltrate, cardiomegaly present.  Patient subsequently admitted for chest pain rule out.    Review of Systems  Review of Systems   Constitutional: Negative for chills and fever.   HENT: Negative for congestion and sinus pain.    Eyes: Negative for photophobia and pain.   Respiratory: Positive for shortness of breath and wheezing. Negative for cough.    Cardiovascular: Positive for chest pain and leg swelling.   Gastrointestinal: Negative for abdominal pain, nausea and vomiting.   Genitourinary: Negative for dysuria and urgency.   Musculoskeletal: Positive for back pain. " Negative for falls.   Neurological: Positive for dizziness. Negative for focal weakness and seizures.   Psychiatric/Behavioral: Positive for substance abuse. The patient is nervous/anxious.        Past Medical History   has a past medical history of Asthma, Bipolar 2 disorder (HCC), Chickenpox, Chronic obstructive pulmonary disease (HCC), Hypertension, Other psychological stress, and Schizophrenic disorder (HCC).    Surgical History   has a past surgical history that includes hysterectomy laparoscopy; colectomy; cholecystectomy; and knee arthroscopy (Left).     Family History  family history includes Cancer in her sister; No Known Problems in her mother.     Social History   reports that she quit smoking about 2 weeks ago. Her smoking use included cigarettes. She has a 20.00 pack-year smoking history. She has never used smokeless tobacco. She reports that she does not drink alcohol or use drugs.    Allergies  Allergies   Allergen Reactions   • Amoxicillin Rash     Tolerates cephalosporins   • Penicillin G Itching       Medications  Prior to Admission Medications   Prescriptions Last Dose Informant Patient Reported? Taking?   acetaminophen (TYLENOL) 325 MG Tab  Patient No No   Sig: Take 2 Tabs by mouth every 6 hours as needed (Mild Pain; (Pain scale 1-3); Temp greater than 100.5 F).   albuterol 108 (90 Base) MCG/ACT Aero Soln inhalation aerosol   No No   Sig: Inhale 2 Puffs every 6 hours as needed for Shortness of Breath.   aripiprazole (ABILIFY) 30 MG tablet  Patient Yes No   Sig: Take 30 mg by mouth every morning.   aspirin EC (ECOTRIN) 81 MG Tablet Delayed Response   Yes No   Sig: Take 81 mg by mouth every day.   atorvastatin (LIPITOR) 20 MG Tab  Patient No No   Sig: Take 1 Tab by mouth every day.   Patient taking differently: Take 20 mg by mouth every bedtime.   azithromycin (ZITHROMAX) 250 MG Tab   No No   Sig: Take two tabs by mouth on day one, then one tab by mouth daily on days 2-5.   Patient not taking:  Reported on 1/6/2021   benztropine (COGENTIN) 1 MG Tab  Patient Yes No   Sig: Take 1 mg by mouth every bedtime.   cephALEXin (KEFLEX) 500 MG Cap   No No   Sig: Take 1 Cap by mouth 4 times a day.   Patient not taking: Reported on 1/6/2021   furosemide (LASIX) 20 MG Tab  Patient No No   Sig: Take 1 Tab by mouth every day.   haloperidol (HALDOL) 5 MG Tab  Patient Yes No   Sig: Take 5 mg by mouth every bedtime.   ibuprofen (MOTRIN) 800 MG Tab   No No   Sig: Take 1 Tab by mouth every 8 hours as needed for Moderate Pain or Inflammation.   ipratropium-albuterol (DUONEB) 0.5-2.5 (3) MG/3ML nebulizer solution  Patient No No   Sig: 3 mL by Nebulization route every 6 hours as needed for Shortness of Breath.   lithium CR (LITHOBID) 300 MG Tab CR  Patient Yes No   Sig: Take 300-600 mg by mouth 2 Times a Day. 300 mg in AM and 600 mg at bedtime   miconazole (MICOTIN) 2 % Cream   No No   Sig: Apply 1 Inch topically 2 times a day.   montelukast (SINGULAIR) 10 MG Tab   No No   Sig: Take 1 Tab by mouth every bedtime.   tiotropium (SPIRIVA HANDIHALER) 18 MCG Cap  Patient No No   Sig: Inhale 1 Cap by mouth every day.      Facility-Administered Medications: None       Physical Exam  Temp:  [36.1 °C (96.9 °F)] 36.1 °C (96.9 °F)  Pulse:  [65-78] 65  Resp:  [12-22] 22  BP: (111-163)/(54-92) 111/54  SpO2:  [93 %-98 %] 93 %    Physical Exam  Constitutional:       General: She is not in acute distress.     Appearance: She is obese.      Comments: Obese 45-year-old female appears older than her stated age, alert and conversant, appears chronically ill   HENT:      Head: Normocephalic and atraumatic.      Nose: Nose normal. No rhinorrhea.      Mouth/Throat:      Mouth: Mucous membranes are dry.      Pharynx: No oropharyngeal exudate.   Eyes:      Extraocular Movements: Extraocular movements intact.      Conjunctiva/sclera: Conjunctivae normal.      Pupils: Pupils are equal, round, and reactive to light.   Neck:      Musculoskeletal: Normal range  of motion and neck supple.   Cardiovascular:      Rate and Rhythm: Normal rate and regular rhythm.      Pulses: Normal pulses.      Heart sounds: No murmur.   Pulmonary:      Effort: Pulmonary effort is normal. No respiratory distress.      Breath sounds: Wheezing present. No rhonchi.   Abdominal:      General: Bowel sounds are normal. There is no distension.      Palpations: Abdomen is soft.      Tenderness: There is no abdominal tenderness. There is no guarding.   Musculoskeletal: Normal range of motion.         General: No tenderness, deformity or signs of injury.   Skin:     General: Skin is dry.      Capillary Refill: Capillary refill takes less than 2 seconds.      Comments: Extensive seborrheic dermatitis to patient's face and scalp   Neurological:      General: No focal deficit present.      Mental Status: She is alert and oriented to person, place, and time. Mental status is at baseline.      Cranial Nerves: No cranial nerve deficit.      Sensory: No sensory deficit.      Motor: No weakness.   Psychiatric:         Mood and Affect: Mood normal.         Behavior: Behavior normal.         Laboratory:  Recent Labs     01/18/21  0140   WBC 15.5*   RBC 4.23   HEMOGLOBIN 12.5   HEMATOCRIT 40.8   MCV 96.5   MCH 29.6   MCHC 30.6*   RDW 52.0*   PLATELETCT 278   MPV 11.0     Recent Labs     01/18/21  0140   SODIUM 134*   POTASSIUM 4.0   CHLORIDE 100   CO2 22   GLUCOSE 107*   BUN 13   CREATININE 0.92   CALCIUM 9.2     Recent Labs     01/18/21  0140   ALTSGPT 12   ASTSGOT 11*   ALKPHOSPHAT 104*   TBILIRUBIN 0.5   GLUCOSE 107*         No results for input(s): NTPROBNP in the last 72 hours.      Recent Labs     01/18/21  0140 01/18/21  0508   TROPONINT 16 16       Imaging:  DX-CHEST-PORTABLE (1 VIEW)   Final Result         1.  No focal infiltrates.   2.  Cardiomegaly      NM-CARDIAC STRESS TEST    (Results Pending)   EC-ECHOCARDIOGRAM COMPLETE W/O CONT    (Results Pending)         Assessment/Plan:  I anticipate this  patient is appropriate for observation status at this time.    * Chest pain, rule out acute myocardial infarction- (present on admission)  Assessment & Plan  -Multiple cardiac risk factors, is going through cardiac work-up as outpatient has not had stress test.  -Aspirin given, admit to telemetry, trend troponin  -Stress test in the a.m.  -Echocardiogram  -Repeat EKG if chest pain returns  -Risk stratify with A1c and lipid panel    KATHIA and COPD overlap syndrome (HCC)- (present on admission)  Assessment & Plan  -RT consult  -Resume home Singulair, Spiriva  -DuoNebs as needed  -BiPAP nightly    Schizophrenia (HCC)- (present on admission)  Assessment & Plan  -Resume home regimen of Abilify, haloperidol, lithium, benztropine    Morbid obesity (HCC)- (present on admission)  Assessment & Plan  -Weight loss and lifestyle modification counseling prior to discharge

## 2021-01-18 NOTE — ED TRIAGE NOTES
"Lorene Haro   45 y.o. female   Chief Complaint   Patient presents with   • Chest Pain   • Dizziness      The patient presents to the ED via triage for evaluation of chest pain and dizziness. The patient reports that these symptoms have persisted for about 12 hours. She reports that she was recently denied a by pass surgery related to her insurance and has been increasingly stressed following the news. She reports that she has been compliant with her medications. Denies any other symptoms at this time.     Patient ambulatory to triage with a steady gait for above mentioned complaint.  Patient is alert and oriented, speaking in full sentences, following commands and responds appropriately to questions. Not in any apparent distress. Respirations are even and unlabored.   Patient placed in lobby. Patient educated on triage process. Patient encouraged to alert staff of any changes in status.     The patient denies knowingly being around anyone with suspected or confirmed COVID 19.    /79   Pulse 77   Temp 36.1 °C (96.9 °F) (Tympanic)   Resp 12   Ht 1.549 m (5' 1\")   Wt (!) 126.6 kg (279 lb)   LMP  (LMP Unknown)   SpO2 98%   BMI 52.72 kg/m²       "

## 2021-01-18 NOTE — PROGRESS NOTES
Report received from NOC RN. Pt on gurney resting. NPO at this time. Awaiting stress test. Recent VS taken. Pt in no acute distress, no c/o pain.

## 2021-01-19 ENCOUNTER — PHARMACY VISIT (OUTPATIENT)
Dept: PHARMACY | Facility: MEDICAL CENTER | Age: 46
End: 2021-01-19
Payer: COMMERCIAL

## 2021-01-19 ENCOUNTER — PATIENT OUTREACH (OUTPATIENT)
Dept: HEALTH INFORMATION MANAGEMENT | Facility: OTHER | Age: 46
End: 2021-01-19

## 2021-01-19 VITALS
HEART RATE: 55 BPM | WEIGHT: 271.17 LBS | HEIGHT: 61 IN | TEMPERATURE: 96.8 F | BODY MASS INDEX: 51.2 KG/M2 | DIASTOLIC BLOOD PRESSURE: 50 MMHG | RESPIRATION RATE: 20 BRPM | SYSTOLIC BLOOD PRESSURE: 95 MMHG | OXYGEN SATURATION: 98 %

## 2021-01-19 PROBLEM — R07.9 CHEST PAIN, RULE OUT ACUTE MYOCARDIAL INFARCTION: Status: RESOLVED | Noted: 2021-01-18 | Resolved: 2021-01-19

## 2021-01-19 LAB
ANION GAP SERPL CALC-SCNC: 6 MMOL/L (ref 7–16)
B-HCG SERPL-ACNC: <1 MIU/ML (ref 0–5)
BASOPHILS # BLD AUTO: 0.1 % (ref 0–1.8)
BASOPHILS # BLD: 0.01 K/UL (ref 0–0.12)
BUN SERPL-MCNC: 11 MG/DL (ref 8–22)
CALCIUM SERPL-MCNC: 9.1 MG/DL (ref 8.5–10.5)
CHLORIDE SERPL-SCNC: 105 MMOL/L (ref 96–112)
CO2 SERPL-SCNC: 26 MMOL/L (ref 20–33)
CREAT SERPL-MCNC: 0.93 MG/DL (ref 0.5–1.4)
D DIMER PPP IA.FEU-MCNC: 0.29 UG/ML (FEU) (ref 0–0.5)
EOSINOPHIL # BLD AUTO: 0.13 K/UL (ref 0–0.51)
EOSINOPHIL NFR BLD: 1.3 % (ref 0–6.9)
ERYTHROCYTE [DISTWIDTH] IN BLOOD BY AUTOMATED COUNT: 50.1 FL (ref 35.9–50)
GLUCOSE SERPL-MCNC: 123 MG/DL (ref 65–99)
HCT VFR BLD AUTO: 37.6 % (ref 37–47)
HGB BLD-MCNC: 11.5 G/DL (ref 12–16)
IMM GRANULOCYTES # BLD AUTO: 0.03 K/UL (ref 0–0.11)
IMM GRANULOCYTES NFR BLD AUTO: 0.3 % (ref 0–0.9)
LITHIUM SERPL-MCNC: 0.6 MMOL/L (ref 0.6–1.2)
LYMPHOCYTES # BLD AUTO: 1.5 K/UL (ref 1–4.8)
LYMPHOCYTES NFR BLD: 15.2 % (ref 22–41)
MAGNESIUM SERPL-MCNC: 2.4 MG/DL (ref 1.5–2.5)
MCH RBC QN AUTO: 29.3 PG (ref 27–33)
MCHC RBC AUTO-ENTMCNC: 30.6 G/DL (ref 33.6–35)
MCV RBC AUTO: 95.7 FL (ref 81.4–97.8)
MONOCYTES # BLD AUTO: 0.47 K/UL (ref 0–0.85)
MONOCYTES NFR BLD AUTO: 4.8 % (ref 0–13.4)
NEUTROPHILS # BLD AUTO: 7.74 K/UL (ref 2–7.15)
NEUTROPHILS NFR BLD: 78.3 % (ref 44–72)
NRBC # BLD AUTO: 0 K/UL
NRBC BLD-RTO: 0 /100 WBC
PLATELET # BLD AUTO: 250 K/UL (ref 164–446)
PMV BLD AUTO: 10.9 FL (ref 9–12.9)
POTASSIUM SERPL-SCNC: 3.7 MMOL/L (ref 3.6–5.5)
RBC # BLD AUTO: 3.93 M/UL (ref 4.2–5.4)
SODIUM SERPL-SCNC: 137 MMOL/L (ref 135–145)
WBC # BLD AUTO: 9.9 K/UL (ref 4.8–10.8)

## 2021-01-19 PROCEDURE — 80048 BASIC METABOLIC PNL TOTAL CA: CPT

## 2021-01-19 PROCEDURE — 94660 CPAP INITIATION&MGMT: CPT

## 2021-01-19 PROCEDURE — A9270 NON-COVERED ITEM OR SERVICE: HCPCS | Performed by: STUDENT IN AN ORGANIZED HEALTH CARE EDUCATION/TRAINING PROGRAM

## 2021-01-19 PROCEDURE — 700102 HCHG RX REV CODE 250 W/ 637 OVERRIDE(OP): Performed by: INTERNAL MEDICINE

## 2021-01-19 PROCEDURE — 84702 CHORIONIC GONADOTROPIN TEST: CPT

## 2021-01-19 PROCEDURE — 85025 COMPLETE CBC W/AUTO DIFF WBC: CPT

## 2021-01-19 PROCEDURE — 700111 HCHG RX REV CODE 636 W/ 250 OVERRIDE (IP): Performed by: STUDENT IN AN ORGANIZED HEALTH CARE EDUCATION/TRAINING PROGRAM

## 2021-01-19 PROCEDURE — 85379 FIBRIN DEGRADATION QUANT: CPT

## 2021-01-19 PROCEDURE — 94760 N-INVAS EAR/PLS OXIMETRY 1: CPT

## 2021-01-19 PROCEDURE — A9270 NON-COVERED ITEM OR SERVICE: HCPCS | Performed by: INTERNAL MEDICINE

## 2021-01-19 PROCEDURE — RXMED WILLOW AMBULATORY MEDICATION CHARGE: Performed by: STUDENT IN AN ORGANIZED HEALTH CARE EDUCATION/TRAINING PROGRAM

## 2021-01-19 PROCEDURE — 700102 HCHG RX REV CODE 250 W/ 637 OVERRIDE(OP): Performed by: STUDENT IN AN ORGANIZED HEALTH CARE EDUCATION/TRAINING PROGRAM

## 2021-01-19 PROCEDURE — 80178 ASSAY OF LITHIUM: CPT

## 2021-01-19 PROCEDURE — G0378 HOSPITAL OBSERVATION PER HR: HCPCS

## 2021-01-19 PROCEDURE — 83735 ASSAY OF MAGNESIUM: CPT

## 2021-01-19 PROCEDURE — 99217 PR OBSERVATION CARE DISCHARGE: CPT | Mod: GC | Performed by: INTERNAL MEDICINE

## 2021-01-19 PROCEDURE — 96372 THER/PROPH/DIAG INJ SC/IM: CPT

## 2021-01-19 RX ORDER — LIDOCAINE HYDROCHLORIDE 20 MG/ML
JELLY TOPICAL EVERY 4 HOURS PRN
Status: DISCONTINUED | OUTPATIENT
Start: 2021-01-19 | End: 2021-01-19 | Stop reason: HOSPADM

## 2021-01-19 RX ORDER — LANOLIN ALCOHOL/MO/W.PET/CERES
100 CREAM (GRAM) TOPICAL DAILY
Qty: 30 TAB | Refills: 0 | Status: ON HOLD | OUTPATIENT
Start: 2021-01-20 | End: 2021-04-03

## 2021-01-19 RX ORDER — NYSTATIN 100000 [USP'U]/G
POWDER TOPICAL
Qty: 60 G | Refills: 0 | Status: SHIPPED | OUTPATIENT
Start: 2021-01-19 | End: 2021-07-22

## 2021-01-19 RX ORDER — GAUZE BANDAGE 2" X 2"
100 BANDAGE TOPICAL DAILY
Status: DISCONTINUED | OUTPATIENT
Start: 2021-01-19 | End: 2021-01-19 | Stop reason: HOSPADM

## 2021-01-19 RX ORDER — POTASSIUM CHLORIDE 20 MEQ/1
20 TABLET, EXTENDED RELEASE ORAL ONCE
Status: COMPLETED | OUTPATIENT
Start: 2021-01-19 | End: 2021-01-19

## 2021-01-19 RX ORDER — AMOXICILLIN 250 MG
1 CAPSULE ORAL DAILY
Qty: 30 TAB | Refills: 0 | Status: ON HOLD | OUTPATIENT
Start: 2021-01-19 | End: 2021-04-03

## 2021-01-19 RX ORDER — NYSTATIN 100000 [USP'U]/G
POWDER TOPICAL 4 TIMES DAILY
Status: DISCONTINUED | OUTPATIENT
Start: 2021-01-19 | End: 2021-01-19 | Stop reason: HOSPADM

## 2021-01-19 RX ORDER — ACETAMINOPHEN 500 MG
1000 TABLET ORAL EVERY 8 HOURS
Status: DISCONTINUED | OUTPATIENT
Start: 2021-01-19 | End: 2021-01-19 | Stop reason: HOSPADM

## 2021-01-19 RX ADMIN — FUROSEMIDE 20 MG: 40 TABLET ORAL at 05:15

## 2021-01-19 RX ADMIN — LITHIUM CARBONATE 300 MG: 300 TABLET, FILM COATED, EXTENDED RELEASE ORAL at 05:14

## 2021-01-19 RX ADMIN — DOCUSATE SODIUM 50 MG AND SENNOSIDES 8.6 MG 2 TABLET: 8.6; 5 TABLET, FILM COATED ORAL at 05:07

## 2021-01-19 RX ADMIN — ASPIRIN 325 MG: 325 TABLET, FILM COATED ORAL at 05:07

## 2021-01-19 RX ADMIN — HYDROCODONE BITARTRATE AND ACETAMINOPHEN 1 TABLET: 5; 325 TABLET ORAL at 10:19

## 2021-01-19 RX ADMIN — ARIPIPRAZOLE 30 MG: 15 TABLET ORAL at 05:06

## 2021-01-19 RX ADMIN — ENOXAPARIN SODIUM 60 MG: 60 INJECTION SUBCUTANEOUS at 05:03

## 2021-01-19 RX ADMIN — ACETAMINOPHEN 1000 MG: 500 TABLET ORAL at 14:17

## 2021-01-19 RX ADMIN — Medication 100 MG: at 10:19

## 2021-01-19 RX ADMIN — GLYCOPYRROLATE 1 CAPSULE: 15.6 CAPSULE RESPIRATORY (INHALATION) at 05:15

## 2021-01-19 RX ADMIN — POTASSIUM CHLORIDE 20 MEQ: 1500 TABLET, EXTENDED RELEASE ORAL at 10:20

## 2021-01-19 RX ADMIN — NYSTATIN: 100000 POWDER TOPICAL at 14:17

## 2021-01-19 NOTE — DISCHARGE PLANNING
Meds-to-Beds: Discharge prescription orders listed below delivered to patient's bedside. RN notified. Patient counseled.       Tahir Lorenesamina Nicole   Home Medication Instructions TRACIE:[id number]    Printed on:01/19/21 1411   Medication Information                      nystatin (MYCOSTATIN) powder  Apply underneath breasts and on inner thigh area twice a day for fungal infection             senna-docusate (PERICOLACE OR SENOKOT S) 8.6-50 MG Tab  Take 1 Tab by mouth every day.             thiamine (THIAMINE) 100 MG tablet  Take 1 Tablet by mouth every day.                 Lissy Kitchen, Pharmacy Intern

## 2021-01-19 NOTE — PROGRESS NOTES
Community Health Worker Intake  • Social determinates of health intake Complete.   • Identified barriers to Food, Transportation.  • Contact information provided to Lorene Haro   • Has PCP appointment scheduled   • Scheduled Food Delivery  • Accepted Meds-To-Beds.   • Inpatient assessment completed.    ROSA MARIA Pickens met with pt bedside to introduce CCM program. Pt requested CHW to reschedule her PCP appointment that she had for 1/19 at 3:30. CHW cancelled appointment and attempted to reschedule, first available is an appointment pt had already established on 2/24. Pt identifies barriers to food and transportation, CHW confirmed pt has an active Food-Rx script and will be added to the delivery. CHW introduced MTM and will mail information to pt. Pt identifies no other barriers to resources. Pt expressed no other needs.    Plan: CHW will mail all information to pt. CHW will follow up with pt after d/c.

## 2021-01-19 NOTE — DISCHARGE SUMMARY
Discharge Summary    Date of Admission: 1/18/2021  Date of Discharge: No discharge date for patient encounter.  Discharging Attending: Donny Lang M.D.   Discharging Senior Resident: Dr.Moutaz Jose MD.  Discharging Intern: Dr. Padmini Jewell MD.    CHIEF COMPLAINT ON ADMISSION  Chief Complaint   Patient presents with   • Chest Pain   • Dizziness       Reason for Admission  ACS rule out.    Admission Date  1/18/2021    CODE STATUS  Full Code    HPI & HOSPITAL COURSE  This is a 45 y.o. female here with PMHx significant for KATHIA/OHS on CPAP, COPD ,bipolar2/pschizophrania, substance abuse who presented to the hospital with a complaints of chest pain, and dizziness. Patient was admitted for ACS R/O.    Problems addressed during hospital stay:-  1-ACS R/O  Patient had an extensive work-up done including serial EKGs,troponin, D-Dimer , CXR ,Echocardiography and stress test. No evidence of ischemia noticed.    2-Bipolar 2/Pscyhzophrenia;  Stable , lithium level checked and WNL.    3-KATHIA/OHS:  Was restarted on his home CPAP.  4-Leukocytosis:-  Resolved with no intervention. likely stress response as there was no evidence of infection.    5-COPD:  Restarted on home inhalers. Stable with no exacerbation.    Therefore, she is discharged in good and stable condition to home with close outpatient follow-up.    The patient recovered much more quickly than anticipated on admission.    PHYSICAL EXAM ON DISCHARGE  Temp:  [36.1 °C (96.9 °F)-37.1 °C (98.7 °F)] 36.1 °C (96.9 °F)  Pulse:  [58-70] 62  Resp:  [16-20] 20  BP: ()/(46-79) 116/62  SpO2:  [87 %-98 %] 95 %    Physical Exam  Vitals signs and nursing note reviewed.   Constitutional:       General: She is not in acute distress.     Appearance: Normal appearance. She is not ill-appearing.   HENT:      Head: Normocephalic and atraumatic.      Mouth/Throat:      Pharynx: No oropharyngeal exudate or posterior oropharyngeal erythema.   Eyes:      Extraocular Movements:  Extraocular movements intact.      Pupils: Pupils are equal, round, and reactive to light.   Neck:      Musculoskeletal: No neck rigidity or muscular tenderness.   Cardiovascular:      Rate and Rhythm: Normal rate and regular rhythm.      Heart sounds: No murmur. No friction rub. No gallop.    Pulmonary:      Effort: No respiratory distress.      Breath sounds: No wheezing.   Abdominal:      General: Abdomen is flat. There is no distension.      Palpations: Abdomen is soft. There is no mass.      Tenderness: There is no abdominal tenderness.   Musculoskeletal:         General: No swelling or deformity.   Skin:     Coloration: Skin is not jaundiced or pale.   Neurological:      General: No focal deficit present.      Mental Status: She is alert and oriented to person, place, and time.      Cranial Nerves: No cranial nerve deficit.   Psychiatric:         Mood and Affect: Mood normal.         Behavior: Behavior normal.         Discharge Date  01/19/2021.    FOLLOW UP ITEMS POST DISCHARGE  - Follow up with PCP in 1-2 weeks.  - Follow up with Cardiology as scheduled.    DISCHARGE DIAGNOSES  Principal Problem (Resolved):    Chest pain, rule out acute myocardial infarction POA: Yes  Active Problems:    KATHIA and COPD overlap syndrome (HCC) POA: Yes    Morbid obesity (HCC) POA: Yes    Schizophrenia (HCC) POA: Yes      FOLLOW UP  Future Appointments   Date Time Provider Department Center   3/11/2021  1:00 PM Isa Meraz M.D. [facility] None   7/14/2021  2:00 PM Oz Borrego M.D. [facility] None     No follow-up provider specified.    MEDICATIONS ON DISCHARGE     Medication List      ASK your doctor about these medications      Instructions   Abilify 30 MG tablet  Generic drug: aripiprazole   Take 30 mg by mouth every morning.  Dose: 30 mg     acetaminophen 325 MG Tabs  Commonly known as: Tylenol   Take 2 Tabs by mouth every 6 hours as needed (Mild Pain; (Pain scale 1-3); Temp greater than 100.5 F).  Dose: 650 mg     albuterol 108  (90 Base) MCG/ACT Aers inhalation aerosol   Inhale 2 Puffs every 6 hours as needed for Shortness of Breath.  Dose: 2 Puff     aspirin EC 81 MG Tbec  Commonly known as: ECOTRIN   Take 81 mg by mouth every day.  Dose: 81 mg     atorvastatin 20 MG Tabs  Commonly known as: LIPITOR   Take 1 Tab by mouth every day.  Dose: 20 mg     azithromycin 250 MG Tabs  Commonly known as: ZITHROMAX   Take two tabs by mouth on day one, then one tab by mouth daily on days 2-5.     cephALEXin 500 MG Caps  Commonly known as: KEFLEX   Take 1 Cap by mouth 4 times a day.  Dose: 500 mg     furosemide 20 MG Tabs  Commonly known as: LASIX   Take 1 Tab by mouth every day.  Dose: 20 mg     haloperidol 5 MG Tabs  Commonly known as: HALDOL   Take 5 mg by mouth every bedtime.  Dose: 5 mg     ibuprofen 800 MG Tabs  Commonly known as: MOTRIN   Take 1 Tab by mouth every 8 hours as needed for Moderate Pain or Inflammation.  Dose: 800 mg     ipratropium-albuterol 0.5-2.5 (3) MG/3ML nebulizer solution  Commonly known as: DUONEB   3 mL by Nebulization route every 6 hours as needed for Shortness of Breath.  Dose: 3 mL     lithium  MG Tbcr  Commonly known as: LITHOBID   Take 300-600 mg by mouth 2 Times a Day. 300 mg in AM and 600 mg at bedtime  Dose: 300-600 mg     miconazole 2 % Crea  Commonly known as: MICOTIN   Apply 1 Inch topically 2 times a day.  Dose: 1 Inch     montelukast 10 MG Tabs  Commonly known as: SINGULAIR   Take 1 Tab by mouth every bedtime.  Dose: 10 mg     Spiriva HandiHaler 18 MCG Caps  Generic drug: tiotropium   Inhale 1 Cap by mouth every day.  Dose: 18 mcg            Allergies  Allergies   Allergen Reactions   • Amoxicillin Rash and Itching     Tolerates cephalosporins, pt reports that she gets a rash all over her body and gets itchy   • Penicillin G Rash and Itching     pt reports that she gets a rash all over her body and gets itchy       DIET  Orders Placed This Encounter   Procedures   • Diet Order Diet: Cardiac     Standing  Status:   Standing     Number of Occurrences:   1     Order Specific Question:   Diet:     Answer:   Cardiac [6]       ACTIVITY  As tolerated.  Weight bearing as tolerated    CONSULTATIONS  None.    PROCEDURES  None.    Total time spent with patient: 45 minutes

## 2021-01-19 NOTE — PROGRESS NOTES
Discharge instructions reviewed and questions answered. PIV previously removed by bedside RN. Pt to f/u with PCP within week and meds to beds delivered medication. Pt wheeled to car and transported home by mother.

## 2021-01-19 NOTE — DIETARY
NUTRITION SERVICES: BMI - Pt with BMI >40 (=Body mass index is 51.24 kg/m².), morbid obesity. Weight loss counseling not appropriate in acute care setting. RECOMMEND - Referral to outpatient nutrition services for weight management after D/C as appropriate.

## 2021-01-19 NOTE — CARE PLAN
Problem: Safety  Goal: Will remain free from injury  Outcome: PROGRESSING AS EXPECTED  Note: Patient uses call light appropriately.      Problem: Respiratory:  Goal: Respiratory status will improve  Outcome: PROGRESSING AS EXPECTED  Note: CPAP programmed by respiratory. Patient is compliant with use.

## 2021-01-19 NOTE — PROGRESS NOTES
Assumed care at 1915. Patient was alert and oriented x 4. Discussed POC. Patient concerns include BLE pain and swelling. Extremities warm to the touch with mild pitting edema. Bed low and locked, call light in reach.

## 2021-01-19 NOTE — DISCHARGE INSTRUCTIONS
Hello!  Today, we treated you for your:  1. chest pain  2. intertrigo (fungal infection underneath your breast and in your thigh area)  3. Constipation    Please remember to:  1.take your nystatin powder as prescribed, in the inner thigh area and applied under the breasts twice a day  2.  Taking stool softener, Senokot, daily to prevent constipation  3.  Follow-up with your primary care physician to treat your chronic medical conditions     Thank you for choosing [facility]!    Discharge Instructions    Discharged to home by car with relative. Discharged via wheelchair, hospital escort: Yes.  Special equipment needed: Not Applicable    Be sure to schedule a follow-up appointment with your primary care doctor or any specialists as instructed.     Discharge Plan:   Diet Plan: Discussed  Activity Level: Discussed  Confirmed Follow up Appointment: Patient to Call and Schedule Appointment  Confirmed Symptoms Management: Discussed  Medication Reconciliation Updated: Yes  Influenza Vaccine Indication: Not indicated: Previously immunized this influenza season and > 8 years of age    I understand that a diet low in cholesterol, fat, and sodium is recommended for good health. Unless I have been given specific instructions below for another diet, I accept this instruction as my diet prescription.   Other diet: regular diet    Special Instructions: None    · Is patient discharged on Warfarin / Coumadin?   No     Depression / Suicide Risk    As you are discharged from this [facility], it is important to learn how to keep safe from harming yourself.    Recognize the warning signs:  · Abrupt changes in personality, positive or negative- including increase in energy   · Giving away possessions  · Change in eating patterns- significant weight changes-  positive or negative  · Change in sleeping patterns- unable to sleep or sleeping all the time   · Unwillingness or inability to  communicate  · Depression  · Unusual sadness, discouragement and loneliness  · Talk of wanting to die  · Neglect of personal appearance   · Rebelliousness- reckless behavior  · Withdrawal from people/activities they love  · Confusion- inability to concentrate     If you or a loved one observes any of these behaviors or has concerns about self-harm, here's what you can do:  · Talk about it- your feelings and reasons for harming yourself  · Remove any means that you might use to hurt yourself (examples: pills, rope, extension cords, firearm)  · Get professional help from the community (Mental Health, Substance Abuse, psychological counseling)  · Do not be alone:Call your Safe Contact- someone whom you trust who will be there for you.  · Call your local CRISIS HOTLINE 414-4580 or 809-358-6019  · Call your local Children's Mobile Crisis Response Team [place] (821) 269-9976 or www.Medical Connections  · Call the toll free National Suicide Prevention Hotlines   · National Suicide Prevention Lifeline 415-282-JEFE (6971)  · National Hope Line Network 800-SUICIDE (582-4989)        Chest Wall Pain  Chest wall pain is pain in or around the bones and muscles of your chest. Sometimes, an injury causes this pain. Excessive coughing or overuse of arm and chest muscles may also cause chest wall pain. Sometimes, the cause may not be known. This pain may take several weeks or longer to get better.  Follow these instructions at home:  Managing pain, stiffness, and swelling    · If directed, put ice on the painful area:  ? Put ice in a plastic bag.  ? Place a towel between your skin and the bag.  ? Leave the ice on for 20 minutes, 2-3 times per day.  Activity  · Rest as told by your health care provider.  · Avoid activities that cause pain. These include any activities that use your chest muscles or your abdominal and side muscles to lift heavy items. Ask your health care provider what activities are safe for you.  General  instructions    · Take over-the-counter and prescription medicines only as told by your health care provider.  · Do not use any products that contain nicotine or tobacco, such as cigarettes, e-cigarettes, and chewing tobacco. These can delay healing after injury. If you need help quitting, ask your health care provider.  · Keep all follow-up visits as told by your health care provider. This is important.  Contact a health care provider if:  · You have a fever.  · Your chest pain becomes worse.  · You have new symptoms.  Get help right away if:  · You have nausea or vomiting.  · You feel sweaty or light-headed.  · You have a cough with mucus from your lungs (sputum) or you cough up blood.  · You develop shortness of breath.  These symptoms may represent a serious problem that is an emergency. Do not wait to see if the symptoms will go away. Get medical help right away. Call your local emergency services (911 in the [place] Do not drive yourself to the hospital.  Summary  · Chest wall pain is pain in or around the bones and muscles of your chest.  · Depending on the cause, it may be treated with ice, rest, medicines, and avoiding activities that cause pain.  · Contact a health care provider if you have a fever, worsening chest pain, or new symptoms.  · Get help right away if you feel light-headed or you develop shortness of breath. These symptoms may be an emergency.  This information is not intended to replace advice given to you by your health care provider. Make sure you discuss any questions you have with your health care provider.  Document Released: 12/18/2006 Document Revised: 06/20/2019 Document Reviewed: 06/20/2019  [employer] Patient Education © 2020 [employer].      Chest Wall Pain  Chest wall pain is pain in or around the bones and muscles of your chest. Chest wall pain may be caused by:  · An injury.  · Coughing a lot.  · Using your chest and arm muscles too much.  Sometimes, the cause may not be known.  This pain may take a few weeks or longer to get better.  Follow these instructions at home:  Managing pain, stiffness, and swelling  If told, put ice on the painful area:  · Put ice in a plastic bag.  · Place a towel between your skin and the bag.  · Leave the ice on for 20 minutes, 2-3 times a day.    Activity  · Rest as told by your doctor.  · Avoid doing things that cause pain. This includes lifting heavy items.  · Ask your doctor what activities are safe for you.  General instructions    · Take over-the-counter and prescription medicines only as told by your doctor.  · Do not use any products that contain nicotine or tobacco, such as cigarettes, e-cigarettes, and chewing tobacco. If you need help quitting, ask your doctor.  · Keep all follow-up visits as told by your doctor. This is important.  Contact a doctor if:  · You have a fever.  · Your chest pain gets worse.  · You have new symptoms.  Get help right away if:  · You feel sick to your stomach (nauseous) or you throw up (vomit).  · You feel sweaty or light-headed.  · You have a cough with mucus from your lungs (sputum) or you cough up blood.  · You are short of breath.  These symptoms may be an emergency. Do not wait to see if the symptoms will go away. Get medical help right away. Call your local emergency services (911 in the [place] Do not drive yourself to the hospital.  Summary  · Chest wall pain is pain in or around the bones and muscles of your chest.  · It may be treated with ice, rest, and medicines. Your condition may also get better if you avoid doing things that cause pain.  · Contact a doctor if you have a fever, chest pain that gets worse, or new symptoms.  · Get help right away if you feel light-headed or you get short of breath. These symptoms may be an emergency.  This information is not intended to replace advice given to you by your health care provider. Make sure you discuss any questions you have with your health care provider.  Nystatin  topical powder  What is this medicine?  NYSTATIN (mirna STAT in) is an antifungal medicine. It is used to treat certain kinds of fungal or yeast infections of the skin.  This medicine may be used for other purposes; ask your health care provider or pharmacist if you have questions.  COMMON BRAND NAME(S): Mycostatin, Nyamyc, Nyata, Nystop, Pedi-Dri  What should I tell my health care provider before I take this medicine?  They need to know if you have any of these conditions:  · an unusual or allergic reaction to nystatin, other foods, dyes or preservatives  · pregnant or trying to get pregnant  · breast-feeding  How should I use this medicine?  This medicine is for external use on the skin only. Follow the directions on the prescription label. Dust the powder on the affected area (or into socks and shoes). If you are treating diaper rash, do not use tight-fitting diapers or plastic pants. Do not get the medicine in your eyes. If you do, rinse out with plenty of cool tap water. Do not breathe in the powder. Do not use your medicine more often than directed. Use your doses at regular intervals. Finish the full course prescribed by your doctor or health care professional even if you think your condition is better. Do not stop using except on the advice of your doctor or health care professional.  Talk to your pediatrician regarding the use of this medicine in children. Special care may be needed.  Overdosage: If you think you have taken too much of this medicine contact a poison control center or emergency room at once.  NOTE: This medicine is only for you. Do not share this medicine with others.  What if I miss a dose?  If you miss a dose, use it as soon as you can. If it is almost time for your next dose, use only that dose. Do not use double or extra doses.  What may interact with this medicine?  Interactions are not expected. Do not use any other skin products on the affected area without telling your doctor or health  care professional.  This list may not describe all possible interactions. Give your health care provider a list of all the medicines, herbs, non-prescription drugs, or dietary supplements you use. Also tell them if you smoke, drink alcohol, or use illegal drugs. Some items may interact with your medicine.  What should I watch for while using this medicine?  Tell your doctor or health care professional if your symptoms do not improve after 3 days.  After bathing make sure that your skin is very dry. Fungal infections like moist conditions. Do not walk around barefoot.  To help prevent reinfection, wear freshly washed cotton, not synthetic, clothing.  What side effects may I notice from receiving this medicine?  Side effects that you should report to your doctor or health care professional as soon as possible:  · allergic reactions like skin rash, itching or hives, swelling of the face, lips, or tongue  Side effects that usually do not require medical attention (report to your doctor or health care professional if they continue or are bothersome):  · skin irritation  This list may not describe all possible side effects. Call your doctor for medical advice about side effects. You may report side effects to FDA at 3-135-FDA-9756.  Where should I keep my medicine?  Keep out of the reach of children.  Store at room temperature between 15 and 30 degrees C (59 and 86 degrees F). Throw away any unused medicine after the expiration date.  NOTE: This sheet is a summary. It may not cover all possible information. If you have questions about this medicine, talk to your doctor, pharmacist, or health care provider.  © 2020 Elsevier/Gold Standard (2017-01-19 10:36:02)  Senna tablets or capsules  What is this medicine?  SENNA (SEN uh) is a laxative. This medicine is used to relieve constipation. It may also be used to empty and prepare the bowel for surgery or examination.  This medicine may be used for other purposes; ask your  health care provider or pharmacist if you have questions.  COMMON BRAND NAME(S): Black Draught, Ex-Lax, Judy-marry, Lax-Pills, Perdiem, Senexon, Senna, Senna-Lax, Senna-Tabs, Senna-Time, SennaGen, Sennatural, Senokot, Senokot Extra Strength, Senokot Xtra, SenoSol, SenoSol-X, Uni-Cenna  What should I tell my health care provider before I take this medicine?  They need to know if you have any of these conditions:  · blockage in your bowel  · inflammatory bowel disease  · stomach or intestine problems  · sudden change in bowel habits lasting more than 2 weeks  · vomiting  · an unusual or allergic reaction to senna, other medicines, foods, dyes, or preservatives  · pregnant or trying to get pregnant  · breast-feeding  How should I use this medicine?  Take this medicine by mouth with a full glass of water. Follow the directions on the product label. Take exactly as directed. Do not take your medicine more often than directed.  Talk to your pediatrician regarding the use of this medicine in children. While this medicine may be prescribed for children as young as 2 years for selected conditions, precautions do apply.  Overdosage: If you think you have taken too much of this medicine contact a poison control center or emergency room at once.  NOTE: This medicine is only for you. Do not share this medicine with others.  What if I miss a dose?  If you miss a dose, take it as soon as you can. If it is almost time for your next dose, take only that dose. Do not take double or extra doses.  What may interact with this medicine?  Interactions are not expected. Do not use any other laxative products without talking to your healthcare professional.  This list may not describe all possible interactions. Give your health care provider a list of all the medicines, herbs, non-prescription drugs, or dietary supplements you use. Also tell them if you smoke, drink alcohol, or use illegal drugs. Some items may interact with your  medicine.  What should I watch for while using this medicine?  Do not use for more than 1 week unless otherwise directed by your doctor or health care professional.  Stop using this medicine and contact your doctor or health care professional if you have rectal bleeding or do not have a bowel movement after use. These could be signs of a more serious condition.  What side effects may I notice from receiving this medicine?  Side effects that you should report to your doctor or health care professional as soon as possible:  · allergic reactions like skin rash, itching or hives, swelling of the face, lips, or tongue  · bloody or black, tarry stools  · breathing problems  · muscle weakness  · stomach pain  · unusually weak or tired  · vomiting  Side effects that usually do not require medical attention (report to your doctor or health care professional if they continue or are bothersome):  · discolored urine (red or orange)  · gas  · upset stomach  This list may not describe all possible side effects. Call your doctor for medical advice about side effects. You may report side effects to FDA at 0-019-FDA-5657.  Where should I keep my medicine?  Keep out of the reach of children.  Store at room temperature between 15 and 30 degrees C (59 and 86 degrees F). Keep container tightly closed. Throw away any unused medicine after the expiration date.  NOTE: This sheet is a summary. It may not cover all possible information. If you have questions about this medicine, talk to your doctor, pharmacist, or health care provider.  © 2020 Elsevier/Gold Standard (2019-02-08 09:09:21)  Thiamine, Vitamin B1 tablets  What is this medicine?  THIAMINE (THAHY uh min) is a vitamin B1. It is added to a healthy diet to prevent or to treat low vitamin B1 levels.  This vitamin may be used for other purposes; ask your health care provider or pharmacist if you have questions.  This medicine may be used for other purposes; ask your health care  provider or pharmacist if you have questions.  What should I tell my health care provider before I take this medicine?  They need to know if you have any of the following conditions:  · Wernicke's disease  · an unusual or allergic reaction to B vitamins, other medicines, foods, dyes, or preservatives  · pregnant or trying to get pregnant  · breast-feeding  How should I use this medicine?  Take this medicine by mouth with a glass of water. Follow the directions on the package or prescription label. For best results take this medicine with food. Take your medicine at regular intervals. Do not take your medicine more often than directed.  Talk to your pediatrician regarding the use of this medicine in children. While this medicine may be prescribed for selected conditions, precautions do apply.  Overdosage: If you think you have taken too much of this medicine contact a poison control center or emergency room at once.  NOTE: This medicine is only for you. Do not share this medicine with others.  What if I miss a dose?  If you miss a dose, take it as soon as you can. If it is almost time for your next dose, take only that dose. Do not take double or extra doses.  What may interact with this medicine?  Interactions are not expected.  This list may not describe all possible interactions. Give your health care provider a list of all the medicines, herbs, non-prescription drugs, or dietary supplements you use. Also tell them if you smoke, drink alcohol, or use illegal drugs. Some items may interact with your medicine.  What should I watch for while using this medicine?  Follow a healthy diet. Taking a vitamin supplement does not replace the need for a balanced diet. Some foods that have this vitamin naturally are yeast, beans, peas, nuts, pork, and beef. Limit alcohol, smoking and stress.  Too much of this vitamin can be unsafe. Talk to your doctor or health care provider about how much is right for you.  What side effects  may I notice from receiving this medicine?  Side effects that you should report to your doctor or health care professional as soon as possible:  · allergic reactions like skin rash, itching or hives, swelling of the face, lips, or tongue  · chest tightness  · fast, irregular heartbeat  · irritable, restless  · nausea, vomiting  · sweating  · unusually bleeding or bruising  Side effects that usually do not require medical attention (report to your doctor or health care professional if they continue or are bothersome):  · sneezing  This list may not describe all possible side effects. Call your doctor for medical advice about side effects. You may report side effects to FDA at 6-371-FDA-5479.  Where should I keep my medicine?  Keep out of the reach of children.  Store at room temperature between 15 and 30 degrees C (59 and 85 degrees F). Protect from heat and light. Throw away any unused medicine after the expiration date.  NOTE: This sheet is a summary. It may not cover all possible information. If you have questions about this medicine, talk to your doctor, pharmacist, or health care provider.  © 2020 Elsevier/Gold Standard (2009-09-03 12:50:29)

## 2021-01-21 ENCOUNTER — PATIENT OUTREACH (OUTPATIENT)
Dept: HEALTH INFORMATION MANAGEMENT | Facility: OTHER | Age: 46
End: 2021-01-21

## 2021-01-23 LAB
BACTERIA BLD CULT: NORMAL
BACTERIA BLD CULT: NORMAL
SIGNIFICANT IND 70042: NORMAL
SIGNIFICANT IND 70042: NORMAL
SITE SITE: NORMAL
SITE SITE: NORMAL
SOURCE SOURCE: NORMAL
SOURCE SOURCE: NORMAL

## 2021-01-25 NOTE — PROGRESS NOTES
Community Health Worker Intake  • Social determinates of health intake Complete with pt's mother.   • Identified barriers to food.  • Contact information provided to Lorene Haro   • Has PCP appointment scheduled   • Scheduled Food Delivery  • Outpatient assessment completed.  • Did the patient receive medications post discharge: Yes    CHW Nelia called pt's mother after being unable to contact pt to complete outpatient assessment. Per pts' mother Brittney pt could use food assistance, ROSA MARIA added pt to [employer] delivery. Brittney identifies no other barriers to resources, Brittney could not identify any other needs for the pt at this time.     Plan: CHW will d/c pt from [facility] team

## 2021-02-10 ENCOUNTER — PATIENT OUTREACH (OUTPATIENT)
Dept: HEALTH INFORMATION MANAGEMENT | Facility: OTHER | Age: 46
End: 2021-02-10

## 2021-02-10 NOTE — PROGRESS NOTES
ROSA MARIA Nelia received a call from pt informing CHW that she has recently moved and believes her food bags are going to the wrong address. CHW got updated address and added pt to the food delivery. Pt also requested some bus passes. CHW will mail bus passes to pt. Pt expressed no other needs at this time.

## 2021-02-19 ENCOUNTER — HOSPITAL ENCOUNTER (EMERGENCY)
Dept: HOSPITAL 8 - ED | Age: 46
Discharge: HOME | End: 2021-02-19
Payer: MEDICAID

## 2021-02-19 VITALS — WEIGHT: 279.99 LBS | HEIGHT: 62 IN | BODY MASS INDEX: 51.52 KG/M2

## 2021-02-19 VITALS — SYSTOLIC BLOOD PRESSURE: 140 MMHG | DIASTOLIC BLOOD PRESSURE: 72 MMHG

## 2021-02-19 DIAGNOSIS — Z87.891: ICD-10-CM

## 2021-02-19 DIAGNOSIS — Z90.710: ICD-10-CM

## 2021-02-19 DIAGNOSIS — J44.1: Primary | ICD-10-CM

## 2021-02-19 DIAGNOSIS — R94.31: ICD-10-CM

## 2021-02-19 DIAGNOSIS — Z90.49: ICD-10-CM

## 2021-02-19 DIAGNOSIS — R07.89: ICD-10-CM

## 2021-02-19 LAB
ALBUMIN SERPL-MCNC: 3.1 G/DL (ref 3.4–5)
ANION GAP SERPL CALC-SCNC: 5 MMOL/L (ref 5–15)
BASOPHILS # BLD AUTO: 0 X10^3/UL (ref 0–0.1)
BASOPHILS NFR BLD AUTO: 0 % (ref 0–1)
CALCIUM SERPL-MCNC: 9.2 MG/DL (ref 8.5–10.1)
CHLORIDE SERPL-SCNC: 111 MMOL/L (ref 98–107)
CREAT SERPL-MCNC: 1.05 MG/DL (ref 0.55–1.02)
EOSINOPHIL # BLD AUTO: 0.2 X10^3/UL (ref 0–0.4)
EOSINOPHIL NFR BLD AUTO: 2 % (ref 1–7)
ERYTHROCYTE [DISTWIDTH] IN BLOOD BY AUTOMATED COUNT: 14.4 % (ref 9.6–15.2)
LYMPHOCYTES # BLD AUTO: 1.6 X10^3/UL (ref 1–3.4)
LYMPHOCYTES NFR BLD AUTO: 13 % (ref 22–44)
MCH RBC QN AUTO: 28 PG (ref 27–34.8)
MCHC RBC AUTO-ENTMCNC: 31.7 G/DL (ref 32.4–35.8)
MD: NO
MONOCYTES # BLD AUTO: 0.7 X10^3/UL (ref 0.2–0.8)
MONOCYTES NFR BLD AUTO: 5 % (ref 2–9)
NEUTROPHILS # BLD AUTO: 10.3 X10^3/UL (ref 1.8–6.8)
NEUTROPHILS NFR BLD AUTO: 80 % (ref 42–75)
PLATELET # BLD AUTO: 289 X10^3/UL (ref 130–400)
PMV BLD AUTO: 9.3 FL (ref 7.4–10.4)
RBC # BLD AUTO: 4.18 X10^6/UL (ref 3.82–5.3)
TROPONIN I SERPL-MCNC: < 0.015 NG/ML (ref 0–0.04)

## 2021-02-19 PROCEDURE — 85025 COMPLETE CBC W/AUTO DIFF WBC: CPT

## 2021-02-19 PROCEDURE — 82040 ASSAY OF SERUM ALBUMIN: CPT

## 2021-02-19 PROCEDURE — 71045 X-RAY EXAM CHEST 1 VIEW: CPT

## 2021-02-19 PROCEDURE — 36415 COLL VENOUS BLD VENIPUNCTURE: CPT

## 2021-02-19 PROCEDURE — 99285 EMERGENCY DEPT VISIT HI MDM: CPT

## 2021-02-19 PROCEDURE — 94640 AIRWAY INHALATION TREATMENT: CPT

## 2021-02-19 PROCEDURE — 93005 ELECTROCARDIOGRAM TRACING: CPT

## 2021-02-19 PROCEDURE — 84484 ASSAY OF TROPONIN QUANT: CPT

## 2021-02-19 PROCEDURE — 80048 BASIC METABOLIC PNL TOTAL CA: CPT

## 2021-02-19 NOTE — NUR
DUONEB TX STARTED PER ERP ORDER. PT TOLERATING WELL. DENIES ANY NEEDS AT THIS 
TIME. CALL LIGHT AND BELONGINGS WITHIN REACH. WILL CONTINUE TO MONITOR.

## 2021-02-19 NOTE — NUR
Patient given discharge instructions and they have confirmed that they 
understand the instructions.  Patient to d/c desk via wheelchair. Pt to call 
MTM at d/c desk

## 2021-02-19 NOTE — NUR
PT REPORTS IMPROVED SYMPTOMS FOLLOWING BREATHING TX. PT REPOSITIONED IN BED PER 
COMFORT AND DENIES ANY ADDITIONAL NEEDS AT THIS TIME. CALL LIGHT IN REACH

## 2021-02-19 NOTE — NUR
PT UP TO BEDSIDE COMMODE WITH THIS RN. STEADY ON FEET, C/O "INCREASED SOB WHEN 
MOVING". PT RETURNED TO [facility]. PT DENIES ANY ADDITIONAL NEEDS AT 
THIS TIME. CALL LIGHT AND PERSONAL BELONGINGS WITHIN REACH

## 2021-02-19 NOTE — NUR
Patient BIB amb c/o SOB and chest pressure. Patient has a hx of sleep apnea and 
does not have a machine; she has been waking up lately SOB. Patient's chest was 
feeling "gunky," which has now turned to pressure. She used her home inhaler 
and neb tx with no relief. Patient also has a hx of asthma and COPD. 

Patient also c/o a rash under breasts since yesterday.

## 2021-03-03 ENCOUNTER — APPOINTMENT (OUTPATIENT)
Dept: RADIOLOGY | Facility: MEDICAL CENTER | Age: 46
End: 2021-03-03
Payer: MEDICAID

## 2021-03-03 ENCOUNTER — HOSPITAL ENCOUNTER (EMERGENCY)
Facility: MEDICAL CENTER | Age: 46
End: 2021-03-04
Attending: EMERGENCY MEDICINE
Payer: MEDICAID

## 2021-03-03 ENCOUNTER — APPOINTMENT (OUTPATIENT)
Dept: RADIOLOGY | Facility: MEDICAL CENTER | Age: 46
End: 2021-03-03
Attending: EMERGENCY MEDICINE
Payer: MEDICAID

## 2021-03-03 DIAGNOSIS — J40 BRONCHITIS: ICD-10-CM

## 2021-03-03 LAB
ALBUMIN SERPL BCP-MCNC: 4 G/DL (ref 3.2–4.9)
ALBUMIN/GLOB SERPL: 1.2 G/DL
ALP SERPL-CCNC: 126 U/L (ref 30–99)
ALT SERPL-CCNC: 13 U/L (ref 2–50)
ANION GAP SERPL CALC-SCNC: 8 MMOL/L (ref 7–16)
AST SERPL-CCNC: 10 U/L (ref 12–45)
BASOPHILS # BLD AUTO: 0.2 % (ref 0–1.8)
BASOPHILS # BLD: 0.03 K/UL (ref 0–0.12)
BILIRUB SERPL-MCNC: 0.5 MG/DL (ref 0.1–1.5)
BUN SERPL-MCNC: 8 MG/DL (ref 8–22)
CALCIUM SERPL-MCNC: 10.1 MG/DL (ref 8.5–10.5)
CHLORIDE SERPL-SCNC: 102 MMOL/L (ref 96–112)
CO2 SERPL-SCNC: 32 MMOL/L (ref 20–33)
CREAT SERPL-MCNC: 1.11 MG/DL (ref 0.5–1.4)
EOSINOPHIL # BLD AUTO: 0.24 K/UL (ref 0–0.51)
EOSINOPHIL NFR BLD: 1.6 % (ref 0–6.9)
ERYTHROCYTE [DISTWIDTH] IN BLOOD BY AUTOMATED COUNT: 49 FL (ref 35.9–50)
GLOBULIN SER CALC-MCNC: 3.4 G/DL (ref 1.9–3.5)
GLUCOSE SERPL-MCNC: 92 MG/DL (ref 65–99)
HCT VFR BLD AUTO: 45.5 % (ref 37–47)
HGB BLD-MCNC: 13.3 G/DL (ref 12–16)
IMM GRANULOCYTES # BLD AUTO: 0.11 K/UL (ref 0–0.11)
IMM GRANULOCYTES NFR BLD AUTO: 0.7 % (ref 0–0.9)
LYMPHOCYTES # BLD AUTO: 1.71 K/UL (ref 1–4.8)
LYMPHOCYTES NFR BLD: 11.3 % (ref 22–41)
MCH RBC QN AUTO: 27.4 PG (ref 27–33)
MCHC RBC AUTO-ENTMCNC: 29.2 G/DL (ref 33.6–35)
MCV RBC AUTO: 93.8 FL (ref 81.4–97.8)
MONOCYTES # BLD AUTO: 0.86 K/UL (ref 0–0.85)
MONOCYTES NFR BLD AUTO: 5.7 % (ref 0–13.4)
NEUTROPHILS # BLD AUTO: 12.21 K/UL (ref 2–7.15)
NEUTROPHILS NFR BLD: 80.5 % (ref 44–72)
NRBC # BLD AUTO: 0 K/UL
NRBC BLD-RTO: 0 /100 WBC
PLATELET # BLD AUTO: 330 K/UL (ref 164–446)
PMV BLD AUTO: 11.6 FL (ref 9–12.9)
POTASSIUM SERPL-SCNC: 4.8 MMOL/L (ref 3.6–5.5)
PROT SERPL-MCNC: 7.4 G/DL (ref 6–8.2)
RBC # BLD AUTO: 4.85 M/UL (ref 4.2–5.4)
SODIUM SERPL-SCNC: 142 MMOL/L (ref 135–145)
WBC # BLD AUTO: 15.2 K/UL (ref 4.8–10.8)

## 2021-03-03 PROCEDURE — 85025 COMPLETE CBC W/AUTO DIFF WBC: CPT

## 2021-03-03 PROCEDURE — 93005 ELECTROCARDIOGRAM TRACING: CPT | Performed by: EMERGENCY MEDICINE

## 2021-03-03 PROCEDURE — 80053 COMPREHEN METABOLIC PANEL: CPT

## 2021-03-03 PROCEDURE — 99284 EMERGENCY DEPT VISIT MOD MDM: CPT

## 2021-03-03 PROCEDURE — 71045 X-RAY EXAM CHEST 1 VIEW: CPT

## 2021-03-03 PROCEDURE — 93005 ELECTROCARDIOGRAM TRACING: CPT

## 2021-03-03 ASSESSMENT — LIFESTYLE VARIABLES
ON A TYPICAL DAY WHEN YOU DRINK ALCOHOL HOW MANY DRINKS DO YOU HAVE: 0
CONSUMPTION TOTAL: NEGATIVE
HAVE PEOPLE ANNOYED YOU BY CRITICIZING YOUR DRINKING: NO
HAVE YOU EVER FELT YOU SHOULD CUT DOWN ON YOUR DRINKING: NO
TOTAL SCORE: 0
TOTAL SCORE: 0
HOW MANY TIMES IN THE PAST YEAR HAVE YOU HAD 5 OR MORE DRINKS IN A DAY: 0
TOTAL SCORE: 0
EVER FELT BAD OR GUILTY ABOUT YOUR DRINKING: NO
EVER HAD A DRINK FIRST THING IN THE MORNING TO STEADY YOUR NERVES TO GET RID OF A HANGOVER: NO
DO YOU DRINK ALCOHOL: NO
AVERAGE NUMBER OF DAYS PER WEEK YOU HAVE A DRINK CONTAINING ALCOHOL: 0
DOES PATIENT WANT TO STOP DRINKING: NO

## 2021-03-03 ASSESSMENT — FIBROSIS 4 INDEX: FIB4 SCORE: 0.6

## 2021-03-04 VITALS
TEMPERATURE: 97.8 F | SYSTOLIC BLOOD PRESSURE: 109 MMHG | HEIGHT: 61 IN | HEART RATE: 73 BPM | BODY MASS INDEX: 52.2 KG/M2 | WEIGHT: 276.46 LBS | OXYGEN SATURATION: 89 % | RESPIRATION RATE: 14 BRPM | DIASTOLIC BLOOD PRESSURE: 56 MMHG

## 2021-03-04 LAB
EKG IMPRESSION: NORMAL
SARS-COV-2 RNA RESP QL NAA+PROBE: NOTDETECTED
SPECIMEN SOURCE: NORMAL

## 2021-03-04 PROCEDURE — U0003 INFECTIOUS AGENT DETECTION BY NUCLEIC ACID (DNA OR RNA); SEVERE ACUTE RESPIRATORY SYNDROME CORONAVIRUS 2 (SARS-COV-2) (CORONAVIRUS DISEASE [COVID-19]), AMPLIFIED PROBE TECHNIQUE, MAKING USE OF HIGH THROUGHPUT TECHNOLOGIES AS DESCRIBED BY CMS-2020-01-R: HCPCS

## 2021-03-04 PROCEDURE — RXMED WILLOW AMBULATORY MEDICATION CHARGE: Performed by: EMERGENCY MEDICINE

## 2021-03-04 PROCEDURE — 700102 HCHG RX REV CODE 250 W/ 637 OVERRIDE(OP): Performed by: EMERGENCY MEDICINE

## 2021-03-04 PROCEDURE — A9270 NON-COVERED ITEM OR SERVICE: HCPCS | Performed by: EMERGENCY MEDICINE

## 2021-03-04 PROCEDURE — U0005 INFEC AGEN DETEC AMPLI PROBE: HCPCS

## 2021-03-04 PROCEDURE — 700111 HCHG RX REV CODE 636 W/ 250 OVERRIDE (IP): Performed by: EMERGENCY MEDICINE

## 2021-03-04 RX ORDER — DEXAMETHASONE SODIUM PHOSPHATE 10 MG/ML
10 INJECTION, SOLUTION INTRAMUSCULAR; INTRAVENOUS ONCE
Status: COMPLETED | OUTPATIENT
Start: 2021-03-04 | End: 2021-03-04

## 2021-03-04 RX ORDER — ALBUTEROL SULFATE 90 UG/1
2 AEROSOL, METERED RESPIRATORY (INHALATION) EVERY 6 HOURS PRN
Qty: 18 G | Refills: 0 | Status: SHIPPED | OUTPATIENT
Start: 2021-03-04 | End: 2021-05-29 | Stop reason: SDUPTHER

## 2021-03-04 RX ORDER — PREDNISONE 50 MG/1
50 TABLET ORAL DAILY
Qty: 5 TABLET | Refills: 0 | Status: SHIPPED | OUTPATIENT
Start: 2021-03-04 | End: 2021-03-04 | Stop reason: SDUPTHER

## 2021-03-04 RX ORDER — ALBUTEROL SULFATE 90 UG/1
2 AEROSOL, METERED RESPIRATORY (INHALATION) EVERY 6 HOURS PRN
Qty: 8.5 G | Refills: 0 | Status: SHIPPED | OUTPATIENT
Start: 2021-03-04 | End: 2021-03-04 | Stop reason: SDUPTHER

## 2021-03-04 RX ORDER — ALBUTEROL SULFATE 90 UG/1
2 AEROSOL, METERED RESPIRATORY (INHALATION) ONCE
Status: COMPLETED | OUTPATIENT
Start: 2021-03-04 | End: 2021-03-04

## 2021-03-04 RX ORDER — PREDNISONE 50 MG/1
50 TABLET ORAL DAILY
Qty: 5 TABLET | Refills: 0 | Status: ON HOLD | OUTPATIENT
Start: 2021-03-04 | End: 2021-03-22

## 2021-03-04 RX ADMIN — DEXAMETHASONE SODIUM PHOSPHATE 10 MG: 10 INJECTION, SOLUTION INTRAMUSCULAR; INTRAVENOUS at 01:15

## 2021-03-04 RX ADMIN — ALBUTEROL SULFATE 2 PUFF: 90 AEROSOL, METERED RESPIRATORY (INHALATION) at 01:15

## 2021-03-04 NOTE — ED PROVIDER NOTES
ED Provider Note        Primary care provider: SHANKAR Gleason    I verified that the patient was wearing a mask and I was wearing appropriate PPE every time I entered the room. The patient's mask was on the patient at all times during my encounter except for a brief view of the oropharynx.      CHIEF COMPLAINT  Chief Complaint   Patient presents with   • Cough     Started Sunday. Patient reports a productive cough with yellow sputum. Patient had a telemed appointment on Monday and was started on antibiotics. Patient called EMS today because her symptoms have not improved.    • Shortness of Breath   • Fatigue   • Nausea     Started today. Patient states she is having a hard time keeping food down.        HPI  Lorene Haro is a 45 y.o. female who presents to the Emergency Department with chief complaint of cough shortness of breath fatigue nausea. Patient reports history of COPD she has had the symptoms for approximately 5 days she was seen by telemed physician on Monday and was initiated on antibiotics this was 4 days prior. She reports no improvement with this she still had productive cough of thick whitish sputum she has had chills no measured fever she had minor nausea without emesis. Patient has no abdominal pain no constipation diarrhea dysuria hematuria skin changes headache altered mental status no other acute symptoms or concerns no sore throat no change in taste or smell no other acute symptoms at this time.    REVIEW OF SYSTEMS  10 systems reviewed and otherwise negative, pertinent positives and negatives listed in the history of present illness.    PAST MEDICAL HISTORY   has a past medical history of Asthma, Bipolar 2 disorder (HCC), Chickenpox, Chronic obstructive pulmonary disease (HCC), Hypertension, Other psychological stress, and Schizophrenic disorder (HCC).    SURGICAL HISTORY   has a past surgical history that includes hysterectomy laparoscopy; colectomy; cholecystectomy; and knee  "arthroscopy (Left).    SOCIAL HISTORY  Social History     Tobacco Use   • Smoking status: Former Smoker     Packs/day: 1.00     Years: 20.00     Pack years: 20.00     Types: Cigarettes     Quit date: 2020     Years since quittin.1   • Smokeless tobacco: Never Used   • Tobacco comment: Patient states she quit two days ago    Substance Use Topics   • Alcohol use: No   • Drug use: No      Social History     Substance and Sexual Activity   Drug Use No       FAMILY HISTORY  Non-Contributory    CURRENT MEDICATIONS  Home Medications     Reviewed by Liz Mukherjee R.N. (Registered Nurse) on 21 at 2118  Med List Status: Not Addressed   Medication Last Dose Status   albuterol 108 (90 Base) MCG/ACT Aero Soln inhalation aerosol  Active   aripiprazole (ABILIFY) 30 MG tablet  Active   aspirin EC (ECOTRIN) 81 MG Tablet Delayed Response  Active   atorvastatin (LIPITOR) 20 MG Tab  Active   furosemide (LASIX) 20 MG Tab  Active   haloperidol (HALDOL) 5 MG Tab  Active   ipratropium-albuterol (DUONEB) 0.5-2.5 (3) MG/3ML nebulizer solution  Active   lithium CR (LITHOBID) 300 MG Tab CR  Active   miconazole (MICOTIN) 2 % Cream  Active   montelukast (SINGULAIR) 10 MG Tab  Active   nystatin (MYCOSTATIN) powder  Active   senna-docusate (PERICOLACE OR SENOKOT S) 8.6-50 MG Tab  Active   thiamine (THIAMINE) 100 MG tablet  Active   tiotropium (SPIRIVA HANDIHALER) 18 MCG Cap  Active                ALLERGIES  Allergies   Allergen Reactions   • Amoxicillin Rash and Itching     Tolerates cephalosporins, pt reports that she gets a rash all over her body and gets itchy   • Penicillin G Rash and Itching     pt reports that she gets a rash all over her body and gets itchy       PHYSICAL EXAM  VITAL SIGNS: /78   Pulse 84   Temp 36.6 °C (97.8 °F) (Temporal)   Resp 14   Ht 1.549 m (5' 1\")   Wt (!) 125 kg (276 lb 7.3 oz)   LMP  (LMP Unknown)   SpO2 94%   BMI 52.24 kg/m²   Pulse ox interpretation: I interpret this pulse ox as " normal.  Constitutional: Alert and oriented x 3, minimal distress  HEENT: Atraumatic normocephalic, pupils are equal round reactive to light extraocular movements are intact. The nares is clear, external ears are normal, mouth shows moist mucous membranes  Neck: Supple, no JVD no tracheal deviation  Cardiovascular: Regular rate and rhythm no murmur rub or gallop 2+ pulses peripherally x4  Thorax & Lungs: No respiratory distress, no wheezes rales or minor rhonchi that clears with cough  GI: Soft nontender nondistended positive bowel sounds, no peritoneal signs  Skin: Warm dry no acute rash or lesion  Musculoskeletal: Moving all extremities with full range and 5 of 5 strength, no acute  deformity  Neurologic: Cranial nerves III through XII are grossly intact, no sensory deficit, no cerebellar dysfunction   Psychiatric: Appropriate affect for situation at this time      DIAGNOSTIC STUDIES / PROCEDURES  LABS      Results for orders placed or performed during the hospital encounter of 03/03/21   CBC with Differential   Result Value Ref Range    WBC 15.2 (H) 4.8 - 10.8 K/uL    RBC 4.85 4.20 - 5.40 M/uL    Hemoglobin 13.3 12.0 - 16.0 g/dL    Hematocrit 45.5 37.0 - 47.0 %    MCV 93.8 81.4 - 97.8 fL    MCH 27.4 27.0 - 33.0 pg    MCHC 29.2 (L) 33.6 - 35.0 g/dL    RDW 49.0 35.9 - 50.0 fL    Platelet Count 330 164 - 446 K/uL    MPV 11.6 9.0 - 12.9 fL    Neutrophils-Polys 80.50 (H) 44.00 - 72.00 %    Lymphocytes 11.30 (L) 22.00 - 41.00 %    Monocytes 5.70 0.00 - 13.40 %    Eosinophils 1.60 0.00 - 6.90 %    Basophils 0.20 0.00 - 1.80 %    Immature Granulocytes 0.70 0.00 - 0.90 %    Nucleated RBC 0.00 /100 WBC    Neutrophils (Absolute) 12.21 (H) 2.00 - 7.15 K/uL    Lymphs (Absolute) 1.71 1.00 - 4.80 K/uL    Monos (Absolute) 0.86 (H) 0.00 - 0.85 K/uL    Eos (Absolute) 0.24 0.00 - 0.51 K/uL    Baso (Absolute) 0.03 0.00 - 0.12 K/uL    Immature Granulocytes (abs) 0.11 0.00 - 0.11 K/uL    NRBC (Absolute) 0.00 K/uL   Comp Metabolic  Panel   Result Value Ref Range    Sodium 142 135 - 145 mmol/L    Potassium 4.8 3.6 - 5.5 mmol/L    Chloride 102 96 - 112 mmol/L    Co2 32 20 - 33 mmol/L    Anion Gap 8.0 7.0 - 16.0    Glucose 92 65 - 99 mg/dL    Bun 8 8 - 22 mg/dL    Creatinine 1.11 0.50 - 1.40 mg/dL    Calcium 10.1 8.5 - 10.5 mg/dL    AST(SGOT) 10 (L) 12 - 45 U/L    ALT(SGPT) 13 2 - 50 U/L    Alkaline Phosphatase 126 (H) 30 - 99 U/L    Total Bilirubin 0.5 0.1 - 1.5 mg/dL    Albumin 4.0 3.2 - 4.9 g/dL    Total Protein 7.4 6.0 - 8.2 g/dL    Globulin 3.4 1.9 - 3.5 g/dL    A-G Ratio 1.2 g/dL   ESTIMATED GFR   Result Value Ref Range    GFR If African American >60 >60 mL/min/1.73 m 2    GFR If Non  53 (A) >60 mL/min/1.73 m 2   SARS-CoV-2 PCR (24 hour In-House): Collect NP swab in VTM    Specimen: Respirate   Result Value Ref Range    SARS-CoV-2 Source NP Swab    EKG   Result Value Ref Range    Report       [facility] Emergency Dept.    Test Date:  2021  Pt Name:    ADELINA MILLAN                Department: ER  MRN:        [id number]                      Room:       [facility]  Gender:     Female                       Technician: [id number]  :        1975                   Requested By:ER TRIAGE PROTOCOL  Order #:    [id number]                    Reading MD: PATY MILLARD MD    Measurements  Intervals                                Axis  Rate:       78                           P:          61  WA:         140                          QRS:        46  QRSD:       94                           T:          4  QT:         384  QTc:        438    Interpretive Statements  SINUS RHYTHM  Compared to ECG 2021 16:09:43  No significant changes  Electronically Signed On 3-4-2021 8:47:05 PST by PATY MILLARD MD         All labs reviewed by me.      RADIOLOGY  DX-CHEST-PORTABLE (1 VIEW)   Final Result         1.  No acute cardiopulmonary disease.   2.  Cardiomegaly        The radiologist's interpretation of all  "radiological studies have been reviewed by me.    COURSE & MEDICAL DECISION MAKING  Pertinent Labs & Imaging studies reviewed. (See chart for details)    12:10 AM - Patient seen and examined at bedside.         Patient noted to have slightly elevated blood pressure likely circumstantial secondary to presenting complaint. Referred to primary care physician for further evaluation.    Medical Decision Making: Chest x-ray shows moderate cardiomegaly likely acute bronchitis/COPD exacerbation. Patient was initiated on antibiotics by telemed doc. I discussed with her my concerns that this will have no effect on her condition at this time I given her steroids and albuterol here and she is already feeling better I will prescribe her a 5-day burst of prednisone and also provided prescription for albuterol. She will return for worsening cough chest pain shortness of breath any other acute symptoms or concerns otherwise discharged in stable and improved condition.    /78   Pulse 84   Temp 36.6 °C (97.8 °F) (Temporal)   Resp 14   Ht 1.549 m (5' 1\")   Wt (!) 125 kg (276 lb 7.3 oz)   LMP  (LMP Unknown)   SpO2 94%   BMI 52.24 kg/m²     Sheridan Dinh, A.P.N.  [address] [facility] [id number] [phone]    In 2 days  if symptoms persist    [facility], Emergency Dept  [facility] [facility] [address] [phone]    in 12-24 hours if symptoms persist, immediately If symptoms worsen, or if you develop any other symptoms or concerns      Discharge Medication List as of 3/4/2021  1:38 AM          FINAL IMPRESSION  1. Bronchitis Active   2. COPD exacerbation    This dictation has been created using voice recognition software and/or scribes. The accuracy of the dictation is limited by the abilities of the software and the expertise of the scribes. I expect there may be some errors of grammar and possibly content. I made every attempt to manually correct the errors within my dictation. " However, errors related to voice recognition software and/or scribes may still exist and should be interpreted within the appropriate context.

## 2021-03-04 NOTE — ED TRIAGE NOTES
2121 Ekg completed, with old ekg found   [facility]

## 2021-03-04 NOTE — ED NOTES
Discharge instructions given to patient. Education provided on diagnosis, treatment, follow up, and prescriptions provided. Pt verbalized understanding. All questions answered.  Pt taken to the lobby via wheelchair with ED tech. Patient to call MTM for ride home.

## 2021-03-04 NOTE — ED TRIAGE NOTES
Lorene Haro  45 y.o. F  Chief Complaint   Patient presents with   • Cough     Started Sunday. Patient reports a productive cough with yellow sputum. Patient had a telemed appointment on Monday and was started on antibiotics. Patient called EMS today because her symptoms have not improved.    • Shortness of Breath   • Fatigue   • Nausea     Started today. Patient states she is having a hard time keeping food down.        Vitals:    03/03/21 2052   BP: 158/78   Pulse: 84   Resp: 14   Temp: 36.6 °C (97.8 °F)   SpO2: 94%       Triage process explained to patient, apologized for wait time, and returned to lobby.  Pt informed to notify staff of any change in condition. NAD at this time.

## 2021-03-17 ENCOUNTER — PHARMACY VISIT (OUTPATIENT)
Dept: PHARMACY | Facility: MEDICAL CENTER | Age: 46
End: 2021-03-17
Payer: COMMERCIAL

## 2021-03-18 ENCOUNTER — APPOINTMENT (OUTPATIENT)
Dept: RADIOLOGY | Facility: MEDICAL CENTER | Age: 46
DRG: 190 | End: 2021-03-18
Attending: EMERGENCY MEDICINE
Payer: MEDICAID

## 2021-03-18 ENCOUNTER — HOSPITAL ENCOUNTER (INPATIENT)
Facility: MEDICAL CENTER | Age: 46
LOS: 3 days | DRG: 190 | End: 2021-03-23
Attending: EMERGENCY MEDICINE | Admitting: STUDENT IN AN ORGANIZED HEALTH CARE EDUCATION/TRAINING PROGRAM
Payer: MEDICAID

## 2021-03-18 DIAGNOSIS — J44.9 OSA AND COPD OVERLAP SYNDROME (HCC): ICD-10-CM

## 2021-03-18 DIAGNOSIS — I48.0 PAROXYSMAL ATRIAL FIBRILLATION (HCC): ICD-10-CM

## 2021-03-18 DIAGNOSIS — G47.33 OSA AND COPD OVERLAP SYNDROME (HCC): ICD-10-CM

## 2021-03-18 DIAGNOSIS — R06.01 ORTHOPNEA: ICD-10-CM

## 2021-03-18 DIAGNOSIS — J44.1 ACUTE EXACERBATION OF CHRONIC OBSTRUCTIVE PULMONARY DISEASE (COPD) (HCC): ICD-10-CM

## 2021-03-18 DIAGNOSIS — I48.91 ATRIAL FIBRILLATION, UNSPECIFIED TYPE (HCC): ICD-10-CM

## 2021-03-18 DIAGNOSIS — J96.01 ACUTE RESPIRATORY FAILURE WITH HYPOXIA (HCC): ICD-10-CM

## 2021-03-18 DIAGNOSIS — R09.02 HYPOXIA: ICD-10-CM

## 2021-03-18 LAB
ALBUMIN SERPL BCP-MCNC: 3.6 G/DL (ref 3.2–4.9)
ALBUMIN/GLOB SERPL: 1.1 G/DL
ALP SERPL-CCNC: 102 U/L (ref 30–99)
ALT SERPL-CCNC: 12 U/L (ref 2–50)
ANION GAP SERPL CALC-SCNC: 8 MMOL/L (ref 7–16)
ANISOCYTOSIS BLD QL SMEAR: ABNORMAL
AST SERPL-CCNC: 6 U/L (ref 12–45)
BASOPHILS # BLD AUTO: 0.2 % (ref 0–1.8)
BASOPHILS # BLD: 0.03 K/UL (ref 0–0.12)
BILIRUB SERPL-MCNC: 0.4 MG/DL (ref 0.1–1.5)
BUN SERPL-MCNC: 15 MG/DL (ref 8–22)
CALCIUM SERPL-MCNC: 9.5 MG/DL (ref 8.5–10.5)
CHLORIDE SERPL-SCNC: 105 MMOL/L (ref 96–112)
CO2 SERPL-SCNC: 28 MMOL/L (ref 20–33)
COMMENT 1642: NORMAL
CREAT SERPL-MCNC: 1.2 MG/DL (ref 0.5–1.4)
EKG IMPRESSION: NORMAL
EOSINOPHIL # BLD AUTO: 0.04 K/UL (ref 0–0.51)
EOSINOPHIL NFR BLD: 0.3 % (ref 0–6.9)
ERYTHROCYTE [DISTWIDTH] IN BLOOD BY AUTOMATED COUNT: 52.8 FL (ref 35.9–50)
FLUAV RNA SPEC QL NAA+PROBE: NEGATIVE
FLUBV RNA SPEC QL NAA+PROBE: NEGATIVE
GLOBULIN SER CALC-MCNC: 3.4 G/DL (ref 1.9–3.5)
GLUCOSE SERPL-MCNC: 163 MG/DL (ref 65–99)
HCT VFR BLD AUTO: 38.5 % (ref 37–47)
HGB BLD-MCNC: 11.4 G/DL (ref 12–16)
IMM GRANULOCYTES # BLD AUTO: 0.1 K/UL (ref 0–0.11)
IMM GRANULOCYTES NFR BLD AUTO: 0.7 % (ref 0–0.9)
LYMPHOCYTES # BLD AUTO: 2.01 K/UL (ref 1–4.8)
LYMPHOCYTES NFR BLD: 13.4 % (ref 22–41)
MCH RBC QN AUTO: 27.5 PG (ref 27–33)
MCHC RBC AUTO-ENTMCNC: 29.6 G/DL (ref 33.6–35)
MCV RBC AUTO: 93 FL (ref 81.4–97.8)
MICROCYTES BLD QL SMEAR: ABNORMAL
MONOCYTES # BLD AUTO: 0.73 K/UL (ref 0–0.85)
MONOCYTES NFR BLD AUTO: 4.9 % (ref 0–13.4)
MORPHOLOGY BLD-IMP: NORMAL
NEUTROPHILS # BLD AUTO: 12.14 K/UL (ref 2–7.15)
NEUTROPHILS NFR BLD: 80.5 % (ref 44–72)
NRBC # BLD AUTO: 0 K/UL
NRBC BLD-RTO: 0 /100 WBC
NT-PROBNP SERPL IA-MCNC: 489 PG/ML (ref 0–125)
PLATELET # BLD AUTO: 267 K/UL (ref 164–446)
PLATELET BLD QL SMEAR: NORMAL
PMV BLD AUTO: 11.5 FL (ref 9–12.9)
POTASSIUM SERPL-SCNC: 4 MMOL/L (ref 3.6–5.5)
PROT SERPL-MCNC: 7 G/DL (ref 6–8.2)
RBC # BLD AUTO: 4.14 M/UL (ref 4.2–5.4)
RBC BLD AUTO: PRESENT
RSV RNA SPEC QL NAA+PROBE: NEGATIVE
SARS-COV-2 RNA RESP QL NAA+PROBE: NOTDETECTED
SODIUM SERPL-SCNC: 141 MMOL/L (ref 135–145)
SPECIMEN SOURCE: NORMAL
TROPONIN T SERPL-MCNC: 18 NG/L (ref 6–19)
WBC # BLD AUTO: 15.1 K/UL (ref 4.8–10.8)

## 2021-03-18 PROCEDURE — 700111 HCHG RX REV CODE 636 W/ 250 OVERRIDE (IP): Performed by: EMERGENCY MEDICINE

## 2021-03-18 PROCEDURE — 84484 ASSAY OF TROPONIN QUANT: CPT

## 2021-03-18 PROCEDURE — 83880 ASSAY OF NATRIURETIC PEPTIDE: CPT

## 2021-03-18 PROCEDURE — 96374 THER/PROPH/DIAG INJ IV PUSH: CPT

## 2021-03-18 PROCEDURE — 99285 EMERGENCY DEPT VISIT HI MDM: CPT

## 2021-03-18 PROCEDURE — 93005 ELECTROCARDIOGRAM TRACING: CPT | Performed by: EMERGENCY MEDICINE

## 2021-03-18 PROCEDURE — 85025 COMPLETE CBC W/AUTO DIFF WBC: CPT

## 2021-03-18 PROCEDURE — 94760 N-INVAS EAR/PLS OXIMETRY 1: CPT

## 2021-03-18 PROCEDURE — 0241U HCHG SARS-COV-2 COVID-19 NFCT DS RESP RNA 4 TRGT MIC: CPT

## 2021-03-18 PROCEDURE — 80053 COMPREHEN METABOLIC PANEL: CPT

## 2021-03-18 PROCEDURE — 36415 COLL VENOUS BLD VENIPUNCTURE: CPT

## 2021-03-18 PROCEDURE — 700101 HCHG RX REV CODE 250: Performed by: EMERGENCY MEDICINE

## 2021-03-18 PROCEDURE — 71045 X-RAY EXAM CHEST 1 VIEW: CPT

## 2021-03-18 PROCEDURE — C9803 HOPD COVID-19 SPEC COLLECT: HCPCS | Performed by: EMERGENCY MEDICINE

## 2021-03-18 RX ORDER — METHYLPREDNISOLONE SODIUM SUCCINATE 125 MG/2ML
125 INJECTION, POWDER, LYOPHILIZED, FOR SOLUTION INTRAMUSCULAR; INTRAVENOUS ONCE
Status: COMPLETED | OUTPATIENT
Start: 2021-03-18 | End: 2021-03-18

## 2021-03-18 RX ORDER — FUROSEMIDE 10 MG/ML
40 INJECTION INTRAMUSCULAR; INTRAVENOUS ONCE
Status: COMPLETED | OUTPATIENT
Start: 2021-03-19 | End: 2021-03-19

## 2021-03-18 RX ADMIN — ALBUTEROL SULFATE 5 MG: 2.5 SOLUTION RESPIRATORY (INHALATION) at 21:14

## 2021-03-18 RX ADMIN — METHYLPREDNISOLONE SODIUM SUCCINATE 125 MG: 125 INJECTION, POWDER, FOR SOLUTION INTRAMUSCULAR; INTRAVENOUS at 21:14

## 2021-03-18 ASSESSMENT — FIBROSIS 4 INDEX: FIB4 SCORE: 0.4

## 2021-03-19 ENCOUNTER — PATIENT OUTREACH (OUTPATIENT)
Dept: HEALTH INFORMATION MANAGEMENT | Facility: OTHER | Age: 46
End: 2021-03-19

## 2021-03-19 ENCOUNTER — APPOINTMENT (OUTPATIENT)
Dept: CARDIOLOGY | Facility: MEDICAL CENTER | Age: 46
DRG: 190 | End: 2021-03-19
Attending: STUDENT IN AN ORGANIZED HEALTH CARE EDUCATION/TRAINING PROGRAM
Payer: MEDICAID

## 2021-03-19 PROBLEM — J96.00 ACUTE RESPIRATORY FAILURE (HCC): Status: ACTIVE | Noted: 2019-11-18

## 2021-03-19 PROBLEM — R79.89 ELEVATED BRAIN NATRIURETIC PEPTIDE (BNP) LEVEL: Status: ACTIVE | Noted: 2021-03-19

## 2021-03-19 PROBLEM — I48.91 A-FIB (HCC): Status: ACTIVE | Noted: 2021-03-19

## 2021-03-19 PROBLEM — J44.1 ACUTE EXACERBATION OF CHRONIC OBSTRUCTIVE PULMONARY DISEASE (COPD) (HCC): Status: ACTIVE | Noted: 2018-11-13

## 2021-03-19 LAB
ANION GAP SERPL CALC-SCNC: 7 MMOL/L (ref 7–16)
BASE EXCESS BLDA CALC-SCNC: 1 MMOL/L (ref -4–3)
BASOPHILS # BLD AUTO: 0.2 % (ref 0–1.8)
BASOPHILS # BLD: 0.03 K/UL (ref 0–0.12)
BODY TEMPERATURE: ABNORMAL CENTIGRADE
BUN SERPL-MCNC: 22 MG/DL (ref 8–22)
CALCIUM SERPL-MCNC: 9.5 MG/DL (ref 8.5–10.5)
CHLORIDE SERPL-SCNC: 103 MMOL/L (ref 96–112)
CO2 SERPL-SCNC: 30 MMOL/L (ref 20–33)
CREAT SERPL-MCNC: 1.16 MG/DL (ref 0.5–1.4)
EOSINOPHIL # BLD AUTO: 0 K/UL (ref 0–0.51)
EOSINOPHIL NFR BLD: 0 % (ref 0–6.9)
ERYTHROCYTE [DISTWIDTH] IN BLOOD BY AUTOMATED COUNT: 53.9 FL (ref 35.9–50)
GLUCOSE BLD-MCNC: 145 MG/DL (ref 65–99)
GLUCOSE BLD-MCNC: 156 MG/DL (ref 65–99)
GLUCOSE BLD-MCNC: 181 MG/DL (ref 65–99)
GLUCOSE BLD-MCNC: 185 MG/DL (ref 65–99)
GLUCOSE SERPL-MCNC: 222 MG/DL (ref 65–99)
HCO3 BLDA-SCNC: 25 MMOL/L (ref 17–25)
HCT VFR BLD AUTO: 39 % (ref 37–47)
HGB BLD-MCNC: 11.6 G/DL (ref 12–16)
IMM GRANULOCYTES # BLD AUTO: 0.09 K/UL (ref 0–0.11)
IMM GRANULOCYTES NFR BLD AUTO: 0.7 % (ref 0–0.9)
LYMPHOCYTES # BLD AUTO: 0.5 K/UL (ref 1–4.8)
LYMPHOCYTES NFR BLD: 4 % (ref 22–41)
MAGNESIUM SERPL-MCNC: 2.5 MG/DL (ref 1.5–2.5)
MCH RBC QN AUTO: 28.2 PG (ref 27–33)
MCHC RBC AUTO-ENTMCNC: 29.7 G/DL (ref 33.6–35)
MCV RBC AUTO: 94.7 FL (ref 81.4–97.8)
MONOCYTES # BLD AUTO: 0.11 K/UL (ref 0–0.85)
MONOCYTES NFR BLD AUTO: 0.9 % (ref 0–13.4)
NEUTROPHILS # BLD AUTO: 11.66 K/UL (ref 2–7.15)
NEUTROPHILS NFR BLD: 94.2 % (ref 44–72)
NRBC # BLD AUTO: 0 K/UL
NRBC BLD-RTO: 0 /100 WBC
PCO2 BLDA: 38.1 MMHG (ref 26–37)
PH BLDA: 7.44 [PH] (ref 7.4–7.5)
PLATELET # BLD AUTO: 261 K/UL (ref 164–446)
PMV BLD AUTO: 11.4 FL (ref 9–12.9)
PO2 BLDA: 86.8 MMHG (ref 64–87)
POTASSIUM SERPL-SCNC: 5.3 MMOL/L (ref 3.6–5.5)
PROCALCITONIN SERPL-MCNC: <0.05 NG/ML
RBC # BLD AUTO: 4.12 M/UL (ref 4.2–5.4)
SAO2 % BLDA: 96.8 % (ref 93–99)
SODIUM SERPL-SCNC: 140 MMOL/L (ref 135–145)
TSH SERPL DL<=0.005 MIU/L-ACNC: 0.82 UIU/ML (ref 0.38–5.33)
WBC # BLD AUTO: 12.4 K/UL (ref 4.8–10.8)

## 2021-03-19 PROCEDURE — 85025 COMPLETE CBC W/AUTO DIFF WBC: CPT

## 2021-03-19 PROCEDURE — 94664 DEMO&/EVAL PT USE INHALER: CPT

## 2021-03-19 PROCEDURE — A9270 NON-COVERED ITEM OR SERVICE: HCPCS | Performed by: STUDENT IN AN ORGANIZED HEALTH CARE EDUCATION/TRAINING PROGRAM

## 2021-03-19 PROCEDURE — 96372 THER/PROPH/DIAG INJ SC/IM: CPT

## 2021-03-19 PROCEDURE — 83735 ASSAY OF MAGNESIUM: CPT

## 2021-03-19 PROCEDURE — 80048 BASIC METABOLIC PNL TOTAL CA: CPT

## 2021-03-19 PROCEDURE — 700102 HCHG RX REV CODE 250 W/ 637 OVERRIDE(OP): Performed by: STUDENT IN AN ORGANIZED HEALTH CARE EDUCATION/TRAINING PROGRAM

## 2021-03-19 PROCEDURE — G0378 HOSPITAL OBSERVATION PER HR: HCPCS

## 2021-03-19 PROCEDURE — 82962 GLUCOSE BLOOD TEST: CPT | Mod: 91

## 2021-03-19 PROCEDURE — 97166 OT EVAL MOD COMPLEX 45 MIN: CPT

## 2021-03-19 PROCEDURE — 97162 PT EVAL MOD COMPLEX 30 MIN: CPT

## 2021-03-19 PROCEDURE — 82803 BLOOD GASES ANY COMBINATION: CPT

## 2021-03-19 PROCEDURE — 84443 ASSAY THYROID STIM HORMONE: CPT

## 2021-03-19 PROCEDURE — 96376 TX/PRO/DX INJ SAME DRUG ADON: CPT

## 2021-03-19 PROCEDURE — 700101 HCHG RX REV CODE 250: Performed by: STUDENT IN AN ORGANIZED HEALTH CARE EDUCATION/TRAINING PROGRAM

## 2021-03-19 PROCEDURE — 700111 HCHG RX REV CODE 636 W/ 250 OVERRIDE (IP): Performed by: STUDENT IN AN ORGANIZED HEALTH CARE EDUCATION/TRAINING PROGRAM

## 2021-03-19 PROCEDURE — 99220 PR INITIAL OBSERVATION CARE,LEVL III: CPT | Performed by: STUDENT IN AN ORGANIZED HEALTH CARE EDUCATION/TRAINING PROGRAM

## 2021-03-19 PROCEDURE — 94640 AIRWAY INHALATION TREATMENT: CPT

## 2021-03-19 PROCEDURE — 700111 HCHG RX REV CODE 636 W/ 250 OVERRIDE (IP): Performed by: EMERGENCY MEDICINE

## 2021-03-19 PROCEDURE — 96375 TX/PRO/DX INJ NEW DRUG ADDON: CPT

## 2021-03-19 PROCEDURE — 36415 COLL VENOUS BLD VENIPUNCTURE: CPT

## 2021-03-19 PROCEDURE — 84145 PROCALCITONIN (PCT): CPT

## 2021-03-19 RX ORDER — IPRATROPIUM BROMIDE AND ALBUTEROL SULFATE 2.5; .5 MG/3ML; MG/3ML
3 SOLUTION RESPIRATORY (INHALATION)
Status: DISCONTINUED | OUTPATIENT
Start: 2021-03-19 | End: 2021-03-20

## 2021-03-19 RX ORDER — AMOXICILLIN 250 MG
2 CAPSULE ORAL 2 TIMES DAILY
Status: DISCONTINUED | OUTPATIENT
Start: 2021-03-19 | End: 2021-03-23 | Stop reason: HOSPADM

## 2021-03-19 RX ORDER — ACETAMINOPHEN 325 MG/1
650 TABLET ORAL EVERY 6 HOURS PRN
Status: DISCONTINUED | OUTPATIENT
Start: 2021-03-19 | End: 2021-03-23 | Stop reason: HOSPADM

## 2021-03-19 RX ORDER — ARIPIPRAZOLE 10 MG/1
30 TABLET ORAL EVERY MORNING
Status: DISCONTINUED | OUTPATIENT
Start: 2021-03-19 | End: 2021-03-23 | Stop reason: HOSPADM

## 2021-03-19 RX ORDER — DEXTROSE MONOHYDRATE 25 G/50ML
50 INJECTION, SOLUTION INTRAVENOUS
Status: DISCONTINUED | OUTPATIENT
Start: 2021-03-19 | End: 2021-03-23 | Stop reason: HOSPADM

## 2021-03-19 RX ORDER — POLYETHYLENE GLYCOL 3350 17 G/17G
1 POWDER, FOR SOLUTION ORAL
Status: DISCONTINUED | OUTPATIENT
Start: 2021-03-19 | End: 2021-03-23 | Stop reason: HOSPADM

## 2021-03-19 RX ORDER — METHYLPREDNISOLONE SODIUM SUCCINATE 40 MG/ML
40 INJECTION, POWDER, LYOPHILIZED, FOR SOLUTION INTRAMUSCULAR; INTRAVENOUS EVERY 12 HOURS
Status: DISCONTINUED | OUTPATIENT
Start: 2021-03-19 | End: 2021-03-21

## 2021-03-19 RX ORDER — GUAIFENESIN/DEXTROMETHORPHAN 100-10MG/5
10 SYRUP ORAL EVERY 8 HOURS PRN
Status: DISCONTINUED | OUTPATIENT
Start: 2021-03-19 | End: 2021-03-23 | Stop reason: HOSPADM

## 2021-03-19 RX ORDER — LITHIUM CARBONATE 300 MG/1
300 TABLET, FILM COATED, EXTENDED RELEASE ORAL EVERY MORNING
Status: DISCONTINUED | OUTPATIENT
Start: 2021-03-19 | End: 2021-03-23 | Stop reason: HOSPADM

## 2021-03-19 RX ORDER — AZITHROMYCIN 250 MG/1
500 TABLET, FILM COATED ORAL DAILY
Status: COMPLETED | OUTPATIENT
Start: 2021-03-19 | End: 2021-03-21

## 2021-03-19 RX ORDER — FUROSEMIDE 20 MG/1
20 TABLET ORAL DAILY
Status: DISCONTINUED | OUTPATIENT
Start: 2021-03-19 | End: 2021-03-23 | Stop reason: HOSPADM

## 2021-03-19 RX ORDER — BISACODYL 10 MG
10 SUPPOSITORY, RECTAL RECTAL
Status: DISCONTINUED | OUTPATIENT
Start: 2021-03-19 | End: 2021-03-23 | Stop reason: HOSPADM

## 2021-03-19 RX ORDER — IPRATROPIUM BROMIDE AND ALBUTEROL SULFATE 2.5; .5 MG/3ML; MG/3ML
3 SOLUTION RESPIRATORY (INHALATION)
Status: DISCONTINUED | OUTPATIENT
Start: 2021-03-19 | End: 2021-03-19

## 2021-03-19 RX ORDER — MONTELUKAST SODIUM 10 MG/1
10 TABLET ORAL
Status: DISCONTINUED | OUTPATIENT
Start: 2021-03-19 | End: 2021-03-23 | Stop reason: HOSPADM

## 2021-03-19 RX ORDER — LITHIUM CARBONATE 300 MG/1
300-600 TABLET, FILM COATED, EXTENDED RELEASE ORAL 2 TIMES DAILY
Status: DISCONTINUED | OUTPATIENT
Start: 2021-03-19 | End: 2021-03-19

## 2021-03-19 RX ORDER — LITHIUM CARBONATE 300 MG/1
600 TABLET, FILM COATED, EXTENDED RELEASE ORAL
Status: DISCONTINUED | OUTPATIENT
Start: 2021-03-19 | End: 2021-03-23 | Stop reason: HOSPADM

## 2021-03-19 RX ORDER — IPRATROPIUM BROMIDE AND ALBUTEROL SULFATE 2.5; .5 MG/3ML; MG/3ML
3 SOLUTION RESPIRATORY (INHALATION)
Status: DISCONTINUED | OUTPATIENT
Start: 2021-03-19 | End: 2021-03-23 | Stop reason: HOSPADM

## 2021-03-19 RX ORDER — SULFAMETHOXAZOLE AND TRIMETHOPRIM 800; 160 MG/1; MG/1
1 TABLET ORAL 2 TIMES DAILY
Status: ON HOLD | COMMUNITY
Start: 2021-03-01 | End: 2021-03-22

## 2021-03-19 RX ADMIN — LITHIUM CARBONATE 300 MG: 300 TABLET, FILM COATED, EXTENDED RELEASE ORAL at 06:43

## 2021-03-19 RX ADMIN — LITHIUM CARBONATE 600 MG: 300 TABLET, FILM COATED, EXTENDED RELEASE ORAL at 04:01

## 2021-03-19 RX ADMIN — Medication 81 MG: at 06:28

## 2021-03-19 RX ADMIN — METHYLPREDNISOLONE SODIUM SUCCINATE 40 MG: 40 INJECTION, POWDER, FOR SOLUTION INTRAMUSCULAR; INTRAVENOUS at 06:30

## 2021-03-19 RX ADMIN — MONTELUKAST 10 MG: 10 TABLET, FILM COATED ORAL at 20:34

## 2021-03-19 RX ADMIN — AZITHROMYCIN MONOHYDRATE 500 MG: 250 TABLET ORAL at 01:43

## 2021-03-19 RX ADMIN — ENOXAPARIN SODIUM 120 MG: 120 INJECTION SUBCUTANEOUS at 17:01

## 2021-03-19 RX ADMIN — IPRATROPIUM BROMIDE AND ALBUTEROL SULFATE 3 ML: .5; 2.5 SOLUTION RESPIRATORY (INHALATION) at 15:12

## 2021-03-19 RX ADMIN — ARIPIPRAZOLE 30 MG: 10 TABLET ORAL at 06:28

## 2021-03-19 RX ADMIN — ACETAMINOPHEN 650 MG: 325 TABLET, FILM COATED ORAL at 17:01

## 2021-03-19 RX ADMIN — FUROSEMIDE 20 MG: 20 TABLET ORAL at 06:29

## 2021-03-19 RX ADMIN — FUROSEMIDE 40 MG: 10 INJECTION, SOLUTION INTRAMUSCULAR; INTRAVENOUS at 00:00

## 2021-03-19 RX ADMIN — TIOTROPIUM BROMIDE INHALATION SPRAY 5 MCG: 3.12 SPRAY, METERED RESPIRATORY (INHALATION) at 09:15

## 2021-03-19 RX ADMIN — METHYLPREDNISOLONE SODIUM SUCCINATE 40 MG: 40 INJECTION, POWDER, FOR SOLUTION INTRAMUSCULAR; INTRAVENOUS at 17:03

## 2021-03-19 RX ADMIN — GUAIFENESIN AND DEXTROMETHORPHAN 10 ML: 100; 10 SYRUP ORAL at 21:48

## 2021-03-19 RX ADMIN — DOCUSATE SODIUM 50 MG AND SENNOSIDES 8.6 MG 2 TABLET: 8.6; 5 TABLET, FILM COATED ORAL at 06:28

## 2021-03-19 RX ADMIN — LITHIUM CARBONATE 600 MG: 300 TABLET, FILM COATED, EXTENDED RELEASE ORAL at 20:33

## 2021-03-19 RX ADMIN — IPRATROPIUM BROMIDE AND ALBUTEROL SULFATE 3 ML: .5; 2.5 SOLUTION RESPIRATORY (INHALATION) at 09:15

## 2021-03-19 RX ADMIN — ENOXAPARIN SODIUM 120 MG: 120 INJECTION SUBCUTANEOUS at 06:29

## 2021-03-19 RX ADMIN — IPRATROPIUM BROMIDE AND ALBUTEROL SULFATE 3 ML: .5; 2.5 SOLUTION RESPIRATORY (INHALATION) at 20:13

## 2021-03-19 RX ADMIN — DOCUSATE SODIUM 50 MG AND SENNOSIDES 8.6 MG 2 TABLET: 8.6; 5 TABLET, FILM COATED ORAL at 17:01

## 2021-03-19 RX ADMIN — IPRATROPIUM BROMIDE AND ALBUTEROL SULFATE 3 ML: .5; 2.5 SOLUTION RESPIRATORY (INHALATION) at 22:27

## 2021-03-19 RX ADMIN — INSULIN HUMAN 2 UNITS: 100 INJECTION, SOLUTION PARENTERAL at 17:08

## 2021-03-19 ASSESSMENT — COGNITIVE AND FUNCTIONAL STATUS - GENERAL
WALKING IN HOSPITAL ROOM: A LITTLE
TOILETING: A LITTLE
STANDING UP FROM CHAIR USING ARMS: A LITTLE
DAILY ACTIVITIY SCORE: 21
MOVING TO AND FROM BED TO CHAIR: A LITTLE
HELP NEEDED FOR BATHING: A LITTLE
DAILY ACTIVITIY SCORE: 20
CLIMB 3 TO 5 STEPS WITH RAILING: TOTAL
SUGGESTED CMS G CODE MODIFIER DAILY ACTIVITY: CJ
MOVING TO AND FROM BED TO CHAIR: UNABLE
SUGGESTED CMS G CODE MODIFIER DAILY ACTIVITY: CJ
TURNING FROM BACK TO SIDE WHILE IN FLAT BAD: A LITTLE
DRESSING REGULAR LOWER BODY CLOTHING: A LOT
WALKING IN HOSPITAL ROOM: A LOT
CLIMB 3 TO 5 STEPS WITH RAILING: A LITTLE
DRESSING REGULAR LOWER BODY CLOTHING: A LITTLE
MOBILITY SCORE: 16
MOVING FROM LYING ON BACK TO SITTING ON SIDE OF FLAT BED: A LITTLE
SUGGESTED CMS G CODE MODIFIER MOBILITY: CL
MOBILITY SCORE: 14
MOVING FROM LYING ON BACK TO SITTING ON SIDE OF FLAT BED: A LOT
SUGGESTED CMS G CODE MODIFIER MOBILITY: CK
TOILETING: A LITTLE
HELP NEEDED FOR BATHING: A LITTLE
STANDING UP FROM CHAIR USING ARMS: A LOT

## 2021-03-19 ASSESSMENT — PATIENT HEALTH QUESTIONNAIRE - PHQ9
6. FEELING BAD ABOUT YOURSELF - OR THAT YOU ARE A FAILURE OR HAVE LET YOURSELF OR YOUR FAMILY DOWN: SEVERAL DAYS
5. POOR APPETITE OR OVEREATING: MORE THAN HALF THE DAYS
3. TROUBLE FALLING OR STAYING ASLEEP OR SLEEPING TOO MUCH: SEVERAL DAYS
7. TROUBLE CONCENTRATING ON THINGS, SUCH AS READING THE NEWSPAPER OR WATCHING TELEVISION: SEVERAL DAYS
SUM OF ALL RESPONSES TO PHQ9 QUESTIONS 1 AND 2: 2
SUM OF ALL RESPONSES TO PHQ QUESTIONS 1-9: 10
8. MOVING OR SPEAKING SO SLOWLY THAT OTHER PEOPLE COULD HAVE NOTICED. OR THE OPPOSITE, BEING SO FIGETY OR RESTLESS THAT YOU HAVE BEEN MOVING AROUND A LOT MORE THAN USUAL: SEVERAL DAYS
4. FEELING TIRED OR HAVING LITTLE ENERGY: MORE THAN HALF THE DAYS
1. LITTLE INTEREST OR PLEASURE IN DOING THINGS: SEVERAL DAYS
2. FEELING DOWN, DEPRESSED, IRRITABLE, OR HOPELESS: SEVERAL DAYS
9. THOUGHTS THAT YOU WOULD BE BETTER OFF DEAD, OR OF HURTING YOURSELF: NOT AT ALL

## 2021-03-19 ASSESSMENT — LIFESTYLE VARIABLES
ALCOHOL_USE: NO
TOTAL SCORE: 0
AVERAGE NUMBER OF DAYS PER WEEK YOU HAVE A DRINK CONTAINING ALCOHOL: 0
HAVE YOU EVER FELT YOU SHOULD CUT DOWN ON YOUR DRINKING: NO
CONSUMPTION TOTAL: NEGATIVE
HAVE PEOPLE ANNOYED YOU BY CRITICIZING YOUR DRINKING: NO
EVER HAD A DRINK FIRST THING IN THE MORNING TO STEADY YOUR NERVES TO GET RID OF A HANGOVER: NO
EVER FELT BAD OR GUILTY ABOUT YOUR DRINKING: NO
HOW MANY TIMES IN THE PAST YEAR HAVE YOU HAD 5 OR MORE DRINKS IN A DAY: 0
DOES PATIENT WANT TO STOP DRINKING: NO
ON A TYPICAL DAY WHEN YOU DRINK ALCOHOL HOW MANY DRINKS DO YOU HAVE: 0

## 2021-03-19 ASSESSMENT — ENCOUNTER SYMPTOMS
WHEEZING: 1
DIARRHEA: 0
DIZZINESS: 0
BLOOD IN STOOL: 0
CHILLS: 1
ABDOMINAL PAIN: 0
COUGH: 1
LOSS OF CONSCIOUSNESS: 0
SPUTUM PRODUCTION: 0
WEIGHT LOSS: 0
HEMOPTYSIS: 0
PALPITATIONS: 0
WEAKNESS: 0
BLURRED VISION: 0
SHORTNESS OF BREATH: 1
NAUSEA: 0
ORTHOPNEA: 1
VOMITING: 0
SORE THROAT: 0
EYE PAIN: 0
CONSTIPATION: 0
FEVER: 0
MYALGIAS: 0

## 2021-03-19 ASSESSMENT — GAIT ASSESSMENTS
ASSISTIVE DEVICE: FRONT WHEEL WALKER
DISTANCE (FEET): 20
DEVIATION: DECREASED BASE OF SUPPORT;BRADYKINETIC;SHUFFLED GAIT
GAIT LEVEL OF ASSIST: MINIMAL ASSIST
DISTANCE (FEET): 100

## 2021-03-19 ASSESSMENT — FIBROSIS 4 INDEX
FIB4 SCORE: 0.31
FIB4 SCORE: 0.3

## 2021-03-19 ASSESSMENT — PAIN DESCRIPTION - PAIN TYPE
TYPE: ACUTE PAIN
TYPE: ACUTE PAIN

## 2021-03-19 ASSESSMENT — ACTIVITIES OF DAILY LIVING (ADL): TOILETING: INDEPENDENT

## 2021-03-19 NOTE — ASSESSMENT & PLAN NOTE
Mild hypoxia on arrival, improved to 95% on 2L NC  Noted to have diffuse wheezing on physical exam  , Procal neg  CXR personally reviewed and interpreted: mild pulmonary edema or infiltrate    - c/w Solumedrol 40mg BID, on prednisone taper  - Duonebs and O2 therapy as needed, montelukast, Spiriva  - Incentive spirometry  - Azithro 500mg PO qd for COPD exacerbation  - Lasix as needed  - c/w home COPD medications  -RT protocol, wean oxygen as tolerated, I&O

## 2021-03-19 NOTE — RESPIRATORY CARE
"COPD EDUCATION by COPD CLINICAL EDUCATOR  3/19/2021  at  8:54 AM by Joana Ireland, RRT     Patient interviewed by COPD education team.  Patient unable to participate in full program.  Short intervention has been conducted.  A comprehensive packet including information about COPD, home treatment with spacer instruct and nebulizer cleaning. Patient quit smoking 12/2020 and states she is doing good.     COPD Assessment  COPD Clinical Specialists ONLY  COPD Education Initiated: Yes--Short Intervention(refuses full.)  Physician Name: [facility]- rochelle Canela. called schedulers for appointment  Pulmonologist Name: saw Jessica Whitfield 11/16/2020. called schedulers for appointment. may enroll in remote monitoring; has smart phone at bedside and agrees to enroll. awaiting for deposition.  Referrals Initiated: Initiated  Home Health Care: (patient interested. contacted MD)  [facility] Outreach: Yes  [facility]: Yes  Is this a COPD exacerbation patient?: Yes  $ Demo/Eval of SVN's, MDI's and Aerosols: Yes     MY COPD ACTION PLAN     It is recommended that patients and physicians /healthcare providers complete this action plan together. This plan should be discussed at each physician visit and updated as needed.    The green, yellow and red zones show groups of symptoms of COPD. This list of symptoms is not comprehensive, and you may experience other symptoms. In the \"Actions\" column, your healthcare provider has recommended actions for you to take based on your symptoms.    Patient Name: Lorene Haro   YOB: 1975   Last Updated on:     Green Zone:  I am doing well today Actions   •  Usual activitiy and exercise level •  Take daily medications   •  Usual amounts of cough and phlegm/mucus •  Use oxygen as prescribed   •  Sleep well at night •  Continue regular exercise/diet plan   •  Appetite is good •  At all times avoid cigarette smoke, inhaled irritants     Daily Medications " "(these medications are taken every day):   Tiotropium Bromide Monohydrate (Spiriva) 2 Puffs Once daily        Yellow Zone:  I am having a bad day or a COPD flare Actions   •  More breathless than usual •  Continue daily medications   •  I have less energy for my daily activities •  Use quick relief inhaler as ordered   •  Increased or thicker phlegm/mucus •  Use oxygen as prescribed   •  Using quick relief inhaler/nebulizer more often •  Get plenty of rest   •  Swelling of ankles more than usual •  Use pursed lip breathing   •  More coughing than usual •  At all times avoid cigarette smoke, inhaled irritants   •  I feel like I have a \"chest cold\"   •  Poor sleep and my symptoms woke me up   •  My appetite is not good   •  My medicine is not helping    •  Call provider immediately if symptoms don’t improve     Continue daily medications, add rescue medications:   Albuterol/Ipratropium (Combivent, Duoneb)  Albuterol 3mL via nebulizer  2 Puffs Every 4 hours PRN  Every 4 hours PRN       Medications to be used during a flare up, (as Discussed with Provider):           Additional Information:  Use albuterol inhaler with spacer and rinse nebulizer per manufacturers recommendation    Red Zone:  I need urgent medical care Actions   •  Severe shortness of breath even at rest •  Call 911 or seek medical care immediately   •  Not able to do any activity because of breathing    •  Fever or shaking chills    •  Feeling confused or very drowsy     •  Chest pains    •  Coughing up blood              "

## 2021-03-19 NOTE — ASSESSMENT & PLAN NOTE
New onset Afib on ECG  EKG personally reviewed and interpreted: Afib with rate 80s  Chadsvasc 2  Noted to have COPD, KATHIA, and Asthma  - TTE: Normal left ventricular systolic function, EF 60%. The right ventricle was normal in size and function.   - full dose lovenox, will switch to coumadin, not NOAC given her BMI 54 and weight is 130kg. Pharmacy to dose coumadin   - TSH normal  - currently rate controlled

## 2021-03-19 NOTE — THERAPY
Occupational Therapy   Initial Evaluation     Patient Name: Lorene Haro  Age:  45 y.o., Sex:  female  Medical Record #: [id number]  Today's Date: 3/19/2021     Precautions  Precautions: Fall Risk  Comments: easily fatigued, 2L O2    Assessment  Patient is 45 y.o. female admitted for SOB, acute respiratory failure, and acute exacerbation of COPD. Pt has a hx of asthma and COPD. Pt presents to OT eval with generalized weakness, fatigue, decreased activity tolerance, and impaired balance affecting her independence with ADLs, ADL transfers, and functional mobility. Pt needed maxA for LB dressing, spv for standing grooming/hygiene, and spv for toilet transfer. Pt with increased WOB and pain after standing ADLs. Pts is independent with ADLs prior to admission, and her mother assists with driving and IADLs as needed. Pt reports her mother will be able to come stay with her upon discharge therefore recommend home health for continued occupational therapy services. However if pts mother is not able to assist her upon discharge will need post-acute placement. Will continue to work with pt while here to address strength, AE for improved independence with ADLs, and standing ADL endurance.     Plan    Recommend Occupational Therapy 3 times per week until therapy goals are met for the following treatments:  Adaptive Equipment, Manual Therapy Techniques, Neuro Re-Education / Balance, Self Care/Activities of Daily Living, Therapeutic Activities and Therapeutic Exercises.    DC Equipment Recommendations: Tub Transfer Bench, Front-Wheel Walker  Discharge Recommendations: See above.      Subjective    Pt alert, pleasant, and cooperative. Pt reported 5/10 posterior neck pain at end of session. Nsg notified.      Objective       03/19/21 1632   Prior Living Situation   Prior Services Intermittent Physical Support for ADL Per Family  (nsg reports possible mental health caregiving services)   Housing / Facility 1 Story Apartment /  Condo  (studio on 2nd floor)   Steps Into Home 0   Steps In Home 0   Elevator Yes   Bathroom Set up Bathtub / Shower Combination;Shower Chair;Grab Bars   Equipment Owned Tub / Shower Seat   Lives with - Patient's Self Care Capacity Alone and Able to Care For Self   Comments Pt lives alone, her mother lives nearby and assists with IADLs. Pt reports her mother can come stay with her to help as needed upon discharge. Nsg reports pt has mental health caregiving services that come over several times per week.   Prior Level of ADL Function   Self Feeding Independent   Grooming / Hygiene Independent   Bathing Independent   Dressing Independent   Toileting Independent   Comments reports increasing difficulty with tub/shower transfers   Prior Level of IADL Function   Medication Management Independent   Laundry Independent   Kitchen Mobility Independent   Finances Independent   Home Management Independent   Shopping Requires Assist   Prior Level Of Mobility Independent Without Device in Community;Independent Without Device in Home   Driving / Transportation Relatives / Others Provide Transportation  (mother drives)   Occupation (Pre-Hospital Vocational) Retired Due To Disability   Leisure Interests Crafts  (makes jewelry)   Cognition    Cognition / Consciousness WDL   Level of Consciousness Alert   Comments pleasant, cooperative, h/o mental health dx at baseline   Strength Upper Body   Upper Body Strength  X   Gross Strength Generalized Weakness, Equal Bilaterally.    Comments still WFL for ADLs   Balance Assessment   Comments seated independently, standing with close spv using FWW, no overt LOB   ADL Assessment   Grooming Supervision;Standing  (oral care, brushing hair)   Lower Body Dressing Maximal Assist  (socks and shoes)   Toileting   (attempted but no success)   Functional Mobility   Sit to Stand Supervised   Toilet Transfers Supervised   Transfer Method Stand Step   Mobility in room/bathroom with FWW   Distance (Feet)  20   # of Times Distance was Traveled 2   Activity Tolerance   Comments increased WOB with standing ADLs   Patient / Family Goals   Patient / Family Goal #1 To not have to use the walker   Short Term Goals   Short Term Goal # 1 Pt will complete LB dressing Leisa using AE as needed by discharge.   Short Term Goal # 2 Pt will complete toileting Leisa by discharge.   Short Term Goal # 3 Pt will complete standing ADLs/IADLs for 15 mins without seated rest break and spv by discharge.

## 2021-03-19 NOTE — ED NOTES
Med Rec complete per Pt at bedside.  Allergies reviewed  Pt states she was taking an antibiotic but couldn't remember the name. Confirmed through reconciled information it was Bactrim DS. She states she completed it approx 5 days ago.

## 2021-03-19 NOTE — H&P
Hospital Medicine History & Physical Note    Date of Service  3/19/2021    Primary Care Physician  SHANKAR Gleason    Consultants  None    Code Status  Full Code    Chief Complaint  Chief Complaint   Patient presents with   • Shortness of Breath     PT has HX of COPD and asthma. PT has had increased SOB x3days. PT used rescue inhaler at home with no releif. PT presented with SPO2 of 89% RA and expritory wheezing.        History of Presenting Illness  45 y.o. female with history of asthma and COPD.  Who presented 3/18/2021 with complaints of worsening shortness of breath x5 days.  She additionally reports that for the past few days she has had a cough with white sputum, has become more short of breath when lying flat, and she has noticed that her bilateral lower extremities have become swollen over the past 1-1/2 months.  Additionally reported chills, but denies any fevers or chest pain at this time.  She states that she has been using her rescue inhaler every few hours with temporary mild improvement.  Of note, patient was seen here in the ED on 3/4 for similar complaints where she was diagnosed with COPD exacerbation.  She was given albuterol, steroids, and discharged on 5-day course of prednisone.    ED: 88% on RA -> 95% on 2L NC, other vitals unremarkable. WBC 15.1, Hb 11.4, Glucose 163, Alk phos 102, Trop 18, . CXR w/ mild pulm edema/inifltrates and cardiomegaly. ECG w/ Afib. Pt was given Lasix 40mg IV, Albuterol 5mg, and Solumedrol 125mg.     Review of Systems  Review of Systems   Constitutional: Positive for chills and malaise/fatigue. Negative for fever and weight loss.   HENT: Negative for hearing loss and sore throat.    Eyes: Negative for blurred vision and pain.   Respiratory: Positive for cough, shortness of breath and wheezing. Negative for hemoptysis and sputum production.    Cardiovascular: Positive for orthopnea and leg swelling. Negative for chest pain and palpitations.    Gastrointestinal: Negative for abdominal pain, blood in stool, constipation, diarrhea, nausea and vomiting.   Genitourinary: Negative for dysuria and hematuria.   Musculoskeletal: Negative for joint pain and myalgias.   Skin: Negative for rash.   Neurological: Negative for dizziness, loss of consciousness and weakness.       Past Medical History   has a past medical history of Asthma, Bipolar 2 disorder ([facility]), Chickenpox, Chronic obstructive pulmonary disease (HCC), Hypertension, Other psychological stress, and Schizophrenic disorder ([facility]).    Surgical History   has a past surgical history that includes hysterectomy laparoscopy; colectomy; cholecystectomy; and knee arthroscopy (Left).     Family History  family history includes Cancer in her sister; No Known Problems in her mother.     Social History   reports that she quit smoking about 2 months ago. Her smoking use included cigarettes. She has a 20.00 pack-year smoking history. She has never used smokeless tobacco. She reports that she does not drink alcohol and does not use drugs.    Allergies  Allergies   Allergen Reactions   • Amoxicillin Rash and Itching     Tolerates cephalosporins, pt reports that she gets a rash all over her body and gets itchy   • Penicillin G Rash and Itching     pt reports that she gets a rash all over her body and gets itchy       Medications  Prior to Admission Medications   Prescriptions Last Dose Informant Patient Reported? Taking?   albuterol 108 (90 Base) MCG/ACT Aero Soln inhalation aerosol   No No   Sig: Inhale 2 Puffs every 6 hours as needed for Shortness of Breath.   aripiprazole (ABILIFY) 30 MG tablet  Patient Yes No   Sig: Take 30 mg by mouth every morning.   aspirin EC (ECOTRIN) 81 MG Tablet Delayed Response  Patient Yes No   Sig: Take 81 mg by mouth every day.   atorvastatin (LIPITOR) 20 MG Tab  Patient No No   Sig: Take 1 Tab by mouth every day.   Patient not taking: Reported on 1/18/2021   furosemide (LASIX) 20 MG Tab   Patient No No   Sig: Take 1 Tab by mouth every day.   haloperidol (HALDOL) 5 MG Tab  Patient Yes No   Sig: Take 5 mg by mouth every bedtime.   ipratropium-albuterol (DUONEB) 0.5-2.5 (3) MG/3ML nebulizer solution  Patient No No   Sig: 3 mL by Nebulization route every 6 hours as needed for Shortness of Breath.   lithium CR (LITHOBID) 300 MG Tab CR  Patient Yes No   Sig: Take 300-600 mg by mouth 2 Times a Day. 300 mg in AM and 600 mg at bedtime   miconazole (MICOTIN) 2 % Cream  Patient No No   Sig: Apply 1 Inch topically 2 times a day.   Patient taking differently: Apply 1 Inch topically as needed (Pt apply's under breast). Pt apply's under breast   montelukast (SINGULAIR) 10 MG Tab  Patient No No   Sig: Take 1 Tab by mouth every bedtime.   nystatin (MYCOSTATIN) powder   No No   Sig: Apply underneath breasts and on inner thigh area twice a day for fungal infection   predniSONE (DELTASONE) 50 MG Tab   No No   Sig: Take 1 tablet by mouth every day for 5 days.   senna-docusate (PERICOLACE OR SENOKOT S) 8.6-50 MG Tab   No No   Sig: Take 1 Tab by mouth every day.   thiamine (THIAMINE) 100 MG tablet   No No   Sig: Take 1 Tablet by mouth every day.   tiotropium (SPIRIVA HANDIHALER) 18 MCG Cap  Patient No No   Sig: Inhale 1 Cap by mouth every day.      Facility-Administered Medications: None       Physical Exam  Temp:  [36.4 °C (97.6 °F)] 36.4 °C (97.6 °F)  Pulse:  [85] 85  Resp:  [18] 18  BP: (113)/(59) 113/59  SpO2:  [94 %] 94 %    Physical Exam  Vitals and nursing note reviewed.   Constitutional:       General: She is in acute distress.      Appearance: She is obese. She is not ill-appearing.   HENT:      Mouth/Throat:      Mouth: Mucous membranes are moist.   Eyes:      Extraocular Movements: Extraocular movements intact.   Cardiovascular:      Rate and Rhythm: Normal rate. Rhythm irregular.      Pulses: Normal pulses.      Heart sounds: Normal heart sounds. No murmur. No gallop.    Pulmonary:      Effort: Pulmonary effort  is normal. No respiratory distress.      Breath sounds: Wheezing (Diffuse expiratory) present. No rhonchi or rales.   Abdominal:      General: Abdomen is flat. Bowel sounds are normal.      Palpations: Abdomen is soft.      Tenderness: There is no abdominal tenderness.   Musculoskeletal:         General: No deformity. Normal range of motion.      Right lower leg: Edema (2+) present.      Left lower leg: Edema (2+) present.   Skin:     General: Skin is warm and dry.      Coloration: Skin is not jaundiced.      Findings: Erythema (Legs B/L) and rash (Atopic dermatitis on her face) present.   Neurological:      General: No focal deficit present.      Mental Status: She is alert and oriented to person, place, and time.      Motor: No weakness.   Psychiatric:         Mood and Affect: Mood normal.         Laboratory:  Recent Labs     03/18/21 2046   WBC 15.1*   RBC 4.14*   HEMOGLOBIN 11.4*   HEMATOCRIT 38.5   MCV 93.0   MCH 27.5   MCHC 29.6*   RDW 52.8*   PLATELETCT 267   MPV 11.5     Recent Labs     03/18/21 2046   SODIUM 141   POTASSIUM 4.0   CHLORIDE 105   CO2 28   GLUCOSE 163*   BUN 15   CREATININE 1.20   CALCIUM 9.5     Recent Labs     03/18/21 2046   ALTSGPT 12   ASTSGOT 6*   ALKPHOSPHAT 102*   TBILIRUBIN 0.4   GLUCOSE 163*         Recent Labs     03/18/21 2046   NTPROBNP 489*         Recent Labs     03/18/21 2046   TROPONINT 18       Imaging:  DX-CHEST-PORTABLE (1 VIEW)   Final Result         1.  Mild pulmonary edema and/or infiltrates.   2.  Cardiomegaly      EC-ECHOCARDIOGRAM COMPLETE W/O CONT    (Results Pending)         Assessment/Plan:  I anticipate this patient is appropriate for observation status at this time.    * Acute respiratory failure (HCC)- (present on admission)  Assessment & Plan  Mild hypoxia on arrival, improved to 95% on 2L NC  Noted to have diffuse wheezing on physical exam  , Trop 18, WBC 15.1  New onset Afib noted on ECG  CXR w/ mild pulm edema/infiltrats and Cardiomegaly  S/p  Lasix 40mg IV, albuterol 5mg, and solumedrol 125mg  - c/w Solumedrol 4mg BID  - Duonebs and O2 therapy as needed  - Incentive spirometry  - Azithro 500mg PO qd for COPD exacerbation  - Lasix as needed  - f/u with procalcitonin  - c/w home COPD medications    A-fib ([facility])- (present on admission)  Assessment & Plan  New onset Afib on ECG  Chadsvasc 2  Noted to have COPD, KATHIA, and Asthma  - TTE  - full dose lovenox  - TSH/Free T4  - O2 therapy     Elevated brain natriuretic peptide (BNP) level- (present on admission)  Assessment & Plan    - TTE  - Conservative IVF use  - Daily weights, I/O  - Fluid restrict 1200 CC  - Low Na diet  - Lasix as needed      Acute exacerbation of chronic obstructive pulmonary disease (COPD) ([facility])- (present on admission)  Assessment & Plan  Plan as above        Class 3 severe obesity due to excess calories with body mass index (BMI) of 50.0 to 59.9 in adult ([facility])- (present on admission)  Assessment & Plan  I have counseled the patient on healthy diet and exercise to lose weight.       Hx of  Bipolar affective disorder, and schizophrenia- (present on admission)  Assessment & Plan  C/w Home meds    VTE: SCD, Lovenox full dose for afib

## 2021-03-19 NOTE — PROGRESS NOTES
Received bedside report from RN, pt care assumed, VSS, pt assessment complete. Pt AAOx4, with no c/o of pain at this time. No signs of acute distress noted at this time. Plan of care discussed with pt and verbalizes no questions. Pt denies any additional needs at this time. Bed locked/in lowest position, pt educated on fall risk and verbalized understanding, call light within reach, hourly rounding initiated.

## 2021-03-19 NOTE — ASSESSMENT & PLAN NOTE
C/w Home meds  [place] level normal   Spoke to patients Mom, she was sent to the  to make an appointment.  Patient is almost out, refill sent.

## 2021-03-19 NOTE — ED TRIAGE NOTES
Lorene Haro    Chief Complaint   Patient presents with   • Shortness of Breath     PT has HX of COPD and asthma. PT has had increased SOB x3days. PT used rescue inhaler at home with no releif. PT presented with SPO2 of 89% RA and expritory wheezing.        Vitals:    03/18/21 2047   BP: 113/59   Pulse: 85   Resp: 18   Temp: 36.4 °C (97.6 °F)   SpO2: 94%

## 2021-03-19 NOTE — DIETARY
NUTRITION SERVICES: BMI - Pt with BMI >40 (=Body mass index is 54.61 kg/m².), morbid obesity. Weight loss counseling not appropriate in acute care setting. RECOMMEND - Referral to outpatient nutrition services for weight management after D/C as indicated

## 2021-03-19 NOTE — ASSESSMENT & PLAN NOTE
- TTE  - Conservative IVF use  - Daily weights, I/O  - Fluid restrict 1200 CC  - Low Na diet  - Lasix as needed

## 2021-03-19 NOTE — ED PROVIDER NOTES
ED Provider Note    Scribed for José Miguel Stanton M.D. by Chrissy Tuttle. 3/18/2021, 9:01 PM.    Primary care provider: SHANKAR Gleason  Means of arrival: EMS   History obtained from: Patient  History limited by: None     CHIEF COMPLAINT  Chief Complaint   Patient presents with   • Shortness of Breath     PT has HX of COPD and asthma. PT has had increased SOB x3days. PT used rescue inhaler at home with no releif. PT presented with SPO2 of 89% RA and expritory wheezing.        HPI  Lorene Haro is a 45 y.o. female with a history of asthma and COPD who presents to the Emergency Department for shortness of breath onset 2-3 days ago. She has been using her rescue inhaler every few hours today, which mildly alleviates her symptoms. She has an associated cough with phlegm and chills. She denies nausea, vomiting, sore throat, abdominal pain, chest pain, leg swelling. Her shortness of breath is exacerbated when laying down so she has been sleeping sitting up. She is not currently on oxygen at home but she is on a ventilator at night. She stopped smoking cigarettes after her last visit to the ED.      REVIEW OF SYSTEMS  Pertinent positives include shortness of breath, cough, and chills. Pertinent negatives include nausea, vomiting, sore throat, abdominal pain, chest pain, leg swelling. All other systems negative.    PAST MEDICAL HISTORY   has a past medical history of Asthma, Bipolar 2 disorder (HCC), Chickenpox, Chronic obstructive pulmonary disease (HCC), Hypertension, Other psychological stress, and Schizophrenic disorder (HCC).    SURGICAL HISTORY   has a past surgical history that includes hysterectomy laparoscopy; colectomy; cholecystectomy; and knee arthroscopy (Left).    SOCIAL HISTORY  Social History     Tobacco Use   • Smoking status: Former Smoker     Packs/day: 1.00     Years: 20.00     Pack years: 20.00     Types: Cigarettes     Quit date: 2020     Years since quittin.2   • Smokeless  "tobacco: Never Used   • Tobacco comment: Patient states she quit two days ago    Substance Use Topics   • Alcohol use: No   • Drug use: No      Social History     Substance and Sexual Activity   Drug Use No       FAMILY HISTORY  Family History   Problem Relation Age of Onset   • No Known Problems Mother    • Cancer Sister        CURRENT MEDICATIONS  Current Outpatient Medications   Medication Instructions   • albuterol 108 (90 Base) MCG/ACT Aero Soln inhalation aerosol 2 Puffs, Inhalation, EVERY 6 HOURS PRN   • aripiprazole (ABILIFY) 30 mg, Oral, EVERY MORNING   • aspirin EC (ECOTRIN) 81 mg, Oral, DAILY   • atorvastatin (LIPITOR) 20 mg, Oral, DAILY   • furosemide (LASIX) 20 mg, Oral, DAILY   • haloperidol (HALDOL) 5 mg, Oral, EVERY BEDTIME   • ipratropium-albuterol (DUONEB) 0.5-2.5 (3) MG/3ML nebulizer solution 3 mL, Nebulization, EVERY 6 HOURS PRN   • lithium ER (LITHOBID) 300-600 mg, Oral, 2 TIMES DAILY, 300 mg in AM and 600 mg at bedtime   • miconazole (MICOTIN) 2 % Cream 1 Inch, Topical, 2 TIMES DAILY   • montelukast (SINGULAIR) 10 mg, Oral, EVERY BEDTIME   • nystatin (MYCOSTATIN) powder Apply underneath breasts and on inner thigh area twice a day for fungal infection   • predniSONE (DELTASONE) 50 mg, Oral, DAILY   • senna-docusate (PERICOLACE OR SENOKOT S) 8.6-50 MG Tab 1 tablet, Oral, DAILY   • thiamine (THIAMINE) 100 MG tablet Take 1 Tablet by mouth every day.   • tiotropium (SPIRIVA HANDIHALER) 18 MCG Cap 1 capsule, Inhalation, DAILY       ALLERGIES  Allergies   Allergen Reactions   • Amoxicillin Rash and Itching     Tolerates cephalosporins, pt reports that she gets a rash all over her body and gets itchy   • Penicillin G Rash and Itching     pt reports that she gets a rash all over her body and gets itchy       PHYSICAL EXAM  VITAL SIGNS: /59   Pulse 85   Temp 36.4 °C (97.6 °F) (Temporal)   Resp 18   Ht 1.549 m (5' 1\")   Wt 125 kg (275 lb)   LMP  (LMP Unknown)   SpO2 94%   BMI 51.96 kg/m² "     Constitutional: Well developed, Well nourished, mild distress.   HENT: Normocephalic, Atraumatic, mask in place.  Eyes: Conjunctiva normal, No discharge.   Neck: Supple, No stridor.   Cardiovascular: Normal heart rate, Normal rhythm, No murmurs, equal pulses.   Pulmonary: Bilateral wheezing. No rales, No rhonchi.  Chest: No chest wall tenderness or deformity.   Abdomen:Soft, No tenderness, No masses, no rebound, no guarding.   Back: No CVA tenderness.   Musculoskeletal: No major deformities noted, No tenderness. 1+ edema bilaterally to knees. No calf pain, no chords, appear symmetric   Skin: Warm, Dry, No erythema, No rash.   Neurologic: Alert & oriented x 3, Normal motor function,  No focal deficits noted.   Psychiatric: Affect normal, Judgment normal, Mood normal.     LABS  Results for orders placed or performed during the hospital encounter of 03/18/21   CBC w/ Differential   Result Value Ref Range    WBC 15.1 (H) 4.8 - 10.8 K/uL    RBC 4.14 (L) 4.20 - 5.40 M/uL    Hemoglobin 11.4 (L) 12.0 - 16.0 g/dL    Hematocrit 38.5 37.0 - 47.0 %    MCV 93.0 81.4 - 97.8 fL    MCH 27.5 27.0 - 33.0 pg    MCHC 29.6 (L) 33.6 - 35.0 g/dL    RDW 52.8 (H) 35.9 - 50.0 fL    Platelet Count 267 164 - 446 K/uL    MPV 11.5 9.0 - 12.9 fL    Neutrophils-Polys 80.50 (H) 44.00 - 72.00 %    Lymphocytes 13.40 (L) 22.00 - 41.00 %    Monocytes 4.90 0.00 - 13.40 %    Eosinophils 0.30 0.00 - 6.90 %    Basophils 0.20 0.00 - 1.80 %    Immature Granulocytes 0.70 0.00 - 0.90 %    Nucleated RBC 0.00 /100 WBC    Neutrophils (Absolute) 12.14 (H) 2.00 - 7.15 K/uL    Lymphs (Absolute) 2.01 1.00 - 4.80 K/uL    Monos (Absolute) 0.73 0.00 - 0.85 K/uL    Eos (Absolute) 0.04 0.00 - 0.51 K/uL    Baso (Absolute) 0.03 0.00 - 0.12 K/uL    Immature Granulocytes (abs) 0.10 0.00 - 0.11 K/uL    NRBC (Absolute) 0.00 K/uL    Anisocytosis 1+     Microcytosis 1+    Troponin STAT   Result Value Ref Range    Troponin T 18 6 - 19 ng/L   COMP METABOLIC PANEL   Result Value  Ref Range    Sodium 141 135 - 145 mmol/L    Potassium 4.0 3.6 - 5.5 mmol/L    Chloride 105 96 - 112 mmol/L    Co2 28 20 - 33 mmol/L    Anion Gap 8.0 7.0 - 16.0    Glucose 163 (H) 65 - 99 mg/dL    Bun 15 8 - 22 mg/dL    Creatinine 1.20 0.50 - 1.40 mg/dL    Calcium 9.5 8.5 - 10.5 mg/dL    AST(SGOT) 6 (L) 12 - 45 U/L    ALT(SGPT) 12 2 - 50 U/L    Alkaline Phosphatase 102 (H) 30 - 99 U/L    Total Bilirubin 0.4 0.1 - 1.5 mg/dL    Albumin 3.6 3.2 - 4.9 g/dL    Total Protein 7.0 6.0 - 8.2 g/dL    Globulin 3.4 1.9 - 3.5 g/dL    A-G Ratio 1.1 g/dL   proBrain Natriuretic Peptide, NT   Result Value Ref Range    NT-proBNP 489 (H) 0 - 125 pg/mL   COV-2, FLU A/B, AND RSV BY PCR (2-4 HOURS CEPHEID): Collect NP swab in VTM    Specimen: Respirate   Result Value Ref Range    Influenza virus A RNA Negative Negative    Influenza virus B, PCR Negative Negative    RSV, PCR Negative Negative    SARS-CoV-2 by PCR NotDetected     SARS-CoV-2 Source NP Swab    PERIPHERAL SMEAR REVIEW   Result Value Ref Range    Peripheral Smear Review see below    PLATELET ESTIMATE   Result Value Ref Range    Plt Estimation Normal    MORPHOLOGY   Result Value Ref Range    RBC Morphology Present    DIFFERENTIAL COMMENT   Result Value Ref Range    Comments-Diff see below    ESTIMATED GFR   Result Value Ref Range    GFR If  58 (A) >60 mL/min/1.73 m 2    GFR If Non  48 (A) >60 mL/min/1.73 m 2   EKG (NOW)   Result Value Ref Range    Report       [facility] Emergency Dept.    Test Date:  2021  Pt Name:    ADELINA MILLAN                Department: ER  MRN:        [id number]                      Room:        16  Gender:     Female                       Technician: [id number]  :        [id number]                   Requested By:JORDON SWIFT  Order #:    [id number]                    Reading MD: JORDON SWIFT MD    Measurements  Intervals                                Axis  Rate:       81                            P:  TX:                                      QRS:        32  QRSD:       94                           T:          -6  QT:         364  QTc:        423    Interpretive Statements  ATRIAL FIBRILLATION, V-RATE    NONSPECIFIC T ABNORMALITIES, INFERIOR LEADS  BASELINE WANDER IN LEAD(S) II  Compared to ECG 03/03/2021 21:21:09  T-wave abnormality now present  Sinus rhythm no longer present  Electronically Signed On 3- 23:38:21 PDT by JORDON SWIFT MD        All labs reviewed by me.    EKG  12 Lead EKG interpreted by me as shown above.     RADIOLOGY  DX-CHEST-PORTABLE (1 VIEW)   Final Result         1.  Mild pulmonary edema and/or infiltrates.   2.  Cardiomegaly      EC-ECHOCARDIOGRAM COMPLETE W/O CONT    (Results Pending)     The radiologist's interpretation of all radiological studies have been reviewed by me.    COURSE & MEDICAL DECISION MAKING  Pertinent Labs & Imaging studies reviewed. (See chart for details)    9:01 PM - Patient seen and examined at bedside. Patient will be treated with albuterol 5mg and medrol 125 mg. Ordered EKG, DX-Chest, Troponin, CBC w/ Diff, CMP, proBNP, and COVID testing to evaluate her symptoms. The differential diagnoses include but are not limited to: COPD exacerbation, myocardial infarction, or congestive heart failure.     11:01 PM - Patient was reevaluated at bedside. Discussed lab and radiology results with the patient. Patient is breathing easier and is no longer wheezing. Oxygen was turned down.     Discussed the case with Dr. Lake hospitalist who is agreed to consult on hospitalization.      Medical Decision Making: At this point time I think the patient has some new onset atrial fibrillation as well as a COPD exacerbation and some slight volume overload.  I think this is multifactorial symptoms are leading to her increasing shortness of breath.  Patient was given several breathing treatments and with that her wheezing has gone away.  She is still  requiring oxygen.  She will be given Lasix for her volume overload.  At this point time I think she would benefit from hospitalization for continued oxygen support as well as cardiac work-up.    DISPOSITION:  Patient will be hospitalized by Dr. COVINGTON in guarded condition.     FINAL IMPRESSION  1. Atrial fibrillation, unspecified type (HCC)    2. Orthopnea    3. Acute exacerbation of chronic obstructive pulmonary disease (COPD) (HCC)    4. Hypoxia    5. Acute respiratory failure with hypoxia (HCC)          Chrissy HARRIS (Ade), am scribing for, and in the presence of, José Miguel Stanton M.D.    Electronically signed by: Chrissy Tuttle (Ade), 3/18/2021    José Miguel HARRIS M.D. personally performed the services described in this documentation, as scribed by Chrissy Tuttle in my presence, and it is both accurate and complete. C.    The note accurately reflects work and decisions made by me.  José Miguel Stanton M.D.  3/19/2021  12:43 AM

## 2021-03-19 NOTE — PROGRESS NOTES
Patient was admitted earlier this morning.    45 y.o. female with history of asthma and COPD, who presented 3/18/2021 with complaints of worsening shortness of breath x5 days.  She additionally reports that for the past few days she has had a cough with white sputum, has become more short of breath when lying flat, and she has noticed that her bilateral lower extremities have become swollen over the past 1-1/2 months.  Additionally reported chills, but denies any fevers or chest pain at this time.  She states that she has been using her rescue inhaler every few hours with temporary mild improvement.  Of note, patient was seen here in the ED on 3/4 for similar complaints where she was diagnosed with COPD exacerbation.  She was given albuterol, steroids, and discharged on 5-day course of prednisone.    She was noted wheezing on PE. New onset Afib noted on admission  Procal neg  proBNP 489  CXR personally reviewed and interpreted: mild pulmonary edema or infiltrate  EKG personally reviewed and interpreted: Afib with rate 80s     Plans  Cont solu medro 40mg bid, continue Lasix 20 mg daily and montelukast  Continue azithromycin 500 mg for 3 doses  Continue Spiriva, DuoNeb  IS  RT protocol  Wean O2 as tolerated  I/O  Start lovenox therapeutic dose  Insulin SSI  COPD education

## 2021-03-19 NOTE — THERAPY
Physical Therapy   Initial Evaluation     Patient Name: Lorene Haro  Age:  45 y.o., Sex:  female  Medical Record #: [id number]  Today's Date: 3/19/2021     Precautions: (P) Fall Risk    Assessment  Patient is 45 y.o. female who was recently admitted for SOB, acute respiratory failure, and acute exacerbation of COPD. Pt has a hx of asthma and COPD. Pt primarily lives alone and states she is I with functional mobility and ADL's. Pt presented to PT with impaired balance, impaired gait, weakness, and dec activity tolerance. Pt was primarily limited by poor activity tolerance. Pt current functional impairments are affecting her ability to safely perform bed mobility, transfers, sit<>stands, and ambulation at her IPLOF. Pt will continue to benefit from skilled PT while in house, with recommendation for post acute therapy prior to d/c home given current objective findings, IPLOF, and limited social support. If patient progresses with functional mobility while in house pt may be able to d/c home with recs for HH therapy services. Will continue to follow.     Plan    Recommend Physical Therapy 4 times per week until therapy goals are met for the following treatments:  Bed Mobility, Community Re-integration, Equipment, Gait Training, Manual Therapy, Neuro Re-Education / Balance, Self Care/Home Evaluation, Stair Training, Therapeutic Activities and Therapeutic Exercises    DC Equipment Recommendations: (P) Front-Wheel Walker  Discharge Recommendations: (P) Recommend post-acute placement for additional physical therapy services prior to discharge home(if progresses while in house, may d/c home with HH therapy )     Objective     03/19/21 1032   Initial Contact Note    Initial Contact Note Order Received and Verified, Physical Therapy Evaluation in Progress with Full Report to Follow.   Precautions   Precautions Fall Risk   Comments easily fatigued; 2L O2    Vitals   O2 (LPM) 2   O2 Delivery Device Silicone Nasal Cannula    Pain 0 - 10 Group   Therapist Pain Assessment Nurse Notified;0   Prior Living Situation   Prior Services None   Housing / Facility 1 Story House   Steps Into Home 0   Steps In Home 0   Equipment Owned None   Lives with - Patient's Self Care Capacity Alone and Able to Care For Self   Comments pt states she primarily lives alone and will not have anyone to assist upon d/c to home. Will look into caregiving services    Prior Level of Functional Mobility   Bed Mobility Independent   Transfer Status Independent   Ambulation Independent   Distance Ambulation (Feet)   (community )   Assistive Devices Used None   Stairs Independent   Comments pt states she is primarily I with functional mobility    History of Falls   History of Falls No   Cognition    Cognition / Consciousness WDL   Level of Consciousness Alert   Comments pleasant/cooperative    Passive ROM Lower Body   Passive ROM Lower Body WDL   Active ROM Lower Body    Active ROM Lower Body  WDL   Strength Lower Body   Lower Body Strength  X   Gross Strength Generalized Weakness, Equal Bilaterally   Comments presents with gross strength of 4/5 in BLE; functional for short distance ambulation    Sensation Lower Body   Lower Extremity Sensation   WDL   Lower Body Muscle Tone   Lower Body Muscle Tone  WDL   Strength Upper Body   Upper Body Strength  X   Gross Strength Generalized Weakness, Equal Bilaterally.    Comments functional    Upper Body Muscle Tone   Upper Body Muscle Tone  WDL   Neurological Concerns   Neurological Concerns No   Coordination Lower Body    Coordination Lower Body  WDL   Balance Assessment   Sitting Balance (Static) Fair +   Sitting Balance (Dynamic) Fair   Standing Balance (Static) Fair   Standing Balance (Dynamic) Fair -   Weight Shift Sitting Fair   Weight Shift Standing Fair   Comments no sherin LOB noted; however demonstrated with lateral veering during ambulation with FWW use   Gait Analysis   Gait Level Of Assist Minimal Assist   Assistive  Device Front Wheel Walker   Distance (Feet) 100   # of Times Distance was Traveled 1   Deviation Decreased Base Of Support;Bradykinetic;Shuffled Gait   Weight Bearing Status fwb   Comments primarily limited by fatigue, required seated rest break    Bed Mobility    Supine to Sit Minimal Assist   Scooting Supervised   Rolling Supervised   Comments takes extra effort and time for bed mobility; HOB flat and rails up    Functional Mobility   Sit to Stand Minimal Assist   Bed, Chair, Wheelchair Transfer Minimal Assist   Toilet Transfers Minimal Assist   Transfer Method Stand Step   Mobility EOB, sit<>stand, ambulation, to toilet, transfer to chair    Comments cues throughout for handplacement and approirate use of FWW    How much difficulty does the patient currently have...   Turning over in bed (including adjusting bedclothes, sheets and blankets)? 3   Sitting down on and standing up from a chair with arms (e.g., wheelchair, bedside commode, etc.) 3   Moving from lying on back to sitting on the side of the bed? 1   How much help from another person does the patient currently need...   Moving to and from a bed to a chair (including a wheelchair)? 3   Need to walk in a hospital room? 3   Climbing 3-5 steps with a railing? 3   6 clicks Mobility Score 16   Activity Tolerance   Sitting in Chair 10 mins   Sitting Edge of Bed 4 mins   Standing 4-5 mins   Comments limited due to fatigue and SOB    Edema / Skin Assessment   Edema / Skin  Not Assessed   Patient / Family Goals    Patient / Family Goal #1 to go home   Short Term Goals    Short Term Goal # 1 pt will go supine<>sit w/hob flat and rails down I in 6tx for safe d/c home    Short Term Goal # 2 pt will go sit<>stand w/fww w/Mod I in 6tx for safe d/c home    Short Term Goal # 3 pt will transfer bed<>chair w/fww w/Mod I in 6tx for safe d/c home    Short Term Goal # 4 pt will ambulate for 150ft w/fww w/Mod I in 6tx for safe d/c home    Education Group   Education Provided  Role of Physical Therapist   Role of Physical Therapist Patient Response Patient;Acceptance;Demonstration;Explanation;Verbal Demonstration;Action Demonstration   Problem List    Problems Impaired Bed Mobility;Impaired Transfers;Impaired Ambulation;Functional Strength Deficit;Impaired Balance;Decreased Activity Tolerance   Anticipated Discharge Equipment and Recommendations   DC Equipment Recommendations Front-Wheel Walker   Discharge Recommendations Recommend post-acute placement for additional physical therapy services prior to discharge home  (if progresses while in house, may d/c home with  therapy )   Interdisciplinary Plan of Care Collaboration   IDT Collaboration with  Nursing   Patient Position at End of Therapy Seated;Call Light within Reach;Tray Table within Reach;Phone within Reach   Collaboration Comments aware of visit and recs    Session Information   Date / Session Number  3/19-1 (1/4, 3/25)   Priority 3

## 2021-03-19 NOTE — PROGRESS NOTES
Patient arrived to unit via gurney. Ambulated to bed with stand-by assist. Assumed pt care. A/Ox4, VSS. Pt oriented to unit and call light system. Pt educated to call before getting out of bed. Verbalized understanding. POC reviewed and white board updated. Tele box on. Monitor room notified. Fall/safety precautions in place. Call light in reach. Bed locked in lowest position with upper bed rails up. Bed alarm on, plugged in correctly, and placed correctly under pt.

## 2021-03-20 ENCOUNTER — APPOINTMENT (OUTPATIENT)
Dept: CARDIOLOGY | Facility: MEDICAL CENTER | Age: 46
DRG: 190 | End: 2021-03-20
Attending: STUDENT IN AN ORGANIZED HEALTH CARE EDUCATION/TRAINING PROGRAM
Payer: MEDICAID

## 2021-03-20 LAB
EKG IMPRESSION: NORMAL
EKG IMPRESSION: NORMAL
GLUCOSE BLD-MCNC: 125 MG/DL (ref 65–99)
GLUCOSE BLD-MCNC: 133 MG/DL (ref 65–99)
GLUCOSE BLD-MCNC: 165 MG/DL (ref 65–99)
GLUCOSE BLD-MCNC: 98 MG/DL (ref 65–99)
LV EJECT FRACT  99904: 60
LV EJECT FRACT MOD 2C 99903: 63.72
LV EJECT FRACT MOD 4C 99902: 70.84
LV EJECT FRACT MOD BP 99901: 67.38

## 2021-03-20 PROCEDURE — 700117 HCHG RX CONTRAST REV CODE 255: Performed by: STUDENT IN AN ORGANIZED HEALTH CARE EDUCATION/TRAINING PROGRAM

## 2021-03-20 PROCEDURE — 94760 N-INVAS EAR/PLS OXIMETRY 1: CPT

## 2021-03-20 PROCEDURE — 700102 HCHG RX REV CODE 250 W/ 637 OVERRIDE(OP): Performed by: STUDENT IN AN ORGANIZED HEALTH CARE EDUCATION/TRAINING PROGRAM

## 2021-03-20 PROCEDURE — A9270 NON-COVERED ITEM OR SERVICE: HCPCS | Performed by: STUDENT IN AN ORGANIZED HEALTH CARE EDUCATION/TRAINING PROGRAM

## 2021-03-20 PROCEDURE — 93306 TTE W/DOPPLER COMPLETE: CPT

## 2021-03-20 PROCEDURE — 99233 SBSQ HOSP IP/OBS HIGH 50: CPT | Performed by: STUDENT IN AN ORGANIZED HEALTH CARE EDUCATION/TRAINING PROGRAM

## 2021-03-20 PROCEDURE — 96376 TX/PRO/DX INJ SAME DRUG ADON: CPT

## 2021-03-20 PROCEDURE — 96372 THER/PROPH/DIAG INJ SC/IM: CPT

## 2021-03-20 PROCEDURE — 93005 ELECTROCARDIOGRAM TRACING: CPT | Performed by: STUDENT IN AN ORGANIZED HEALTH CARE EDUCATION/TRAINING PROGRAM

## 2021-03-20 PROCEDURE — 93010 ELECTROCARDIOGRAM REPORT: CPT | Performed by: INTERNAL MEDICINE

## 2021-03-20 PROCEDURE — 93010 ELECTROCARDIOGRAM REPORT: CPT | Mod: 77 | Performed by: INTERNAL MEDICINE

## 2021-03-20 PROCEDURE — 94640 AIRWAY INHALATION TREATMENT: CPT

## 2021-03-20 PROCEDURE — 82962 GLUCOSE BLOOD TEST: CPT | Mod: 91

## 2021-03-20 PROCEDURE — 93306 TTE W/DOPPLER COMPLETE: CPT | Mod: 26 | Performed by: INTERNAL MEDICINE

## 2021-03-20 PROCEDURE — 700111 HCHG RX REV CODE 636 W/ 250 OVERRIDE (IP): Performed by: STUDENT IN AN ORGANIZED HEALTH CARE EDUCATION/TRAINING PROGRAM

## 2021-03-20 PROCEDURE — 700101 HCHG RX REV CODE 250: Performed by: STUDENT IN AN ORGANIZED HEALTH CARE EDUCATION/TRAINING PROGRAM

## 2021-03-20 PROCEDURE — 770020 HCHG ROOM/CARE - TELE (206)

## 2021-03-20 RX ORDER — IPRATROPIUM BROMIDE AND ALBUTEROL SULFATE 2.5; .5 MG/3ML; MG/3ML
3 SOLUTION RESPIRATORY (INHALATION)
Status: DISCONTINUED | OUTPATIENT
Start: 2021-03-20 | End: 2021-03-21

## 2021-03-20 RX ORDER — NITROGLYCERIN 0.4 MG/1
0.4 TABLET SUBLINGUAL
Status: DISCONTINUED | OUTPATIENT
Start: 2021-03-20 | End: 2021-03-23 | Stop reason: HOSPADM

## 2021-03-20 RX ORDER — IPRATROPIUM BROMIDE AND ALBUTEROL SULFATE 2.5; .5 MG/3ML; MG/3ML
3 SOLUTION RESPIRATORY (INHALATION)
Status: DISCONTINUED | OUTPATIENT
Start: 2021-03-20 | End: 2021-03-20

## 2021-03-20 RX ADMIN — IPRATROPIUM BROMIDE AND ALBUTEROL SULFATE 3 ML: .5; 2.5 SOLUTION RESPIRATORY (INHALATION) at 10:46

## 2021-03-20 RX ADMIN — APIXABAN 5 MG: 5 TABLET, FILM COATED ORAL at 17:07

## 2021-03-20 RX ADMIN — Medication 81 MG: at 05:26

## 2021-03-20 RX ADMIN — INSULIN HUMAN 2 UNITS: 100 INJECTION, SOLUTION PARENTERAL at 20:00

## 2021-03-20 RX ADMIN — AZITHROMYCIN MONOHYDRATE 500 MG: 250 TABLET ORAL at 05:26

## 2021-03-20 RX ADMIN — NITROGLYCERIN 0.4 MG: 0.4 TABLET, ORALLY DISINTEGRATING SUBLINGUAL at 23:10

## 2021-03-20 RX ADMIN — METHYLPREDNISOLONE SODIUM SUCCINATE 40 MG: 40 INJECTION, POWDER, FOR SOLUTION INTRAMUSCULAR; INTRAVENOUS at 17:07

## 2021-03-20 RX ADMIN — LITHIUM CARBONATE 300 MG: 300 TABLET, FILM COATED, EXTENDED RELEASE ORAL at 05:27

## 2021-03-20 RX ADMIN — FUROSEMIDE 20 MG: 20 TABLET ORAL at 05:26

## 2021-03-20 RX ADMIN — HUMAN ALBUMIN MICROSPHERES AND PERFLUTREN 3 ML: 10; .22 INJECTION, SOLUTION INTRAVENOUS at 13:13

## 2021-03-20 RX ADMIN — MONTELUKAST 10 MG: 10 TABLET, FILM COATED ORAL at 19:56

## 2021-03-20 RX ADMIN — LITHIUM CARBONATE 600 MG: 300 TABLET, FILM COATED, EXTENDED RELEASE ORAL at 19:57

## 2021-03-20 RX ADMIN — ENOXAPARIN SODIUM 120 MG: 120 INJECTION SUBCUTANEOUS at 05:25

## 2021-03-20 RX ADMIN — IPRATROPIUM BROMIDE AND ALBUTEROL SULFATE 3 ML: .5; 2.5 SOLUTION RESPIRATORY (INHALATION) at 14:44

## 2021-03-20 RX ADMIN — LIDOCAINE HYDROCHLORIDE 30 ML: 20 SOLUTION OROPHARYNGEAL at 04:00

## 2021-03-20 RX ADMIN — IPRATROPIUM BROMIDE AND ALBUTEROL SULFATE 3 ML: .5; 2.5 SOLUTION RESPIRATORY (INHALATION) at 02:11

## 2021-03-20 RX ADMIN — NITROGLYCERIN 0.4 MG: 0.4 TABLET, ORALLY DISINTEGRATING SUBLINGUAL at 03:58

## 2021-03-20 RX ADMIN — ARIPIPRAZOLE 30 MG: 10 TABLET ORAL at 05:26

## 2021-03-20 RX ADMIN — DOCUSATE SODIUM 50 MG AND SENNOSIDES 8.6 MG 2 TABLET: 8.6; 5 TABLET, FILM COATED ORAL at 05:27

## 2021-03-20 RX ADMIN — METHYLPREDNISOLONE SODIUM SUCCINATE 40 MG: 40 INJECTION, POWDER, FOR SOLUTION INTRAMUSCULAR; INTRAVENOUS at 05:27

## 2021-03-20 ASSESSMENT — ENCOUNTER SYMPTOMS
CHILLS: 0
FOCAL WEAKNESS: 0
VOMITING: 0
NAUSEA: 0
BRUISES/BLEEDS EASILY: 0
BLURRED VISION: 0
DEPRESSION: 0
FEVER: 0
WHEEZING: 1
HEMOPTYSIS: 0
COUGH: 0
MYALGIAS: 0
PALPITATIONS: 0

## 2021-03-20 ASSESSMENT — CHA2DS2 SCORE
VASCULAR DISEASE: NO
CHA2DS2 VASC SCORE: 1
HYPERTENSION: NO
CHF OR LEFT VENTRICULAR DYSFUNCTION: NO
AGE 75 OR GREATER: NO
DIABETES: NO
AGE 65 TO 74: NO
PRIOR STROKE OR TIA OR THROMBOEMBOLISM: NO
SEX: FEMALE

## 2021-03-20 ASSESSMENT — PAIN DESCRIPTION - PAIN TYPE
TYPE: ACUTE PAIN

## 2021-03-20 NOTE — PROGRESS NOTES
[facility] Medicine Daily Progress Note    Date of Service  3/20/2021    Chief Complaint  45 y.o. female admitted 3/18/2021 with   Chief Complaint   Patient presents with   • Shortness of Breath     PT has HX of COPD and asthma. PT has had increased SOB x3days. PT used rescue inhaler at home with no releif. PT presented with SPO2 of 89% RA and expritory wheezing.          Hospital Course  45 y.o. female with history of asthma and COPD, who presented 3/18/2021 with complaints of worsening shortness of breath x5 days.  She additionally reports that for the past few days she has had a cough with white sputum, has become more short of breath when lying flat, and she has noticed that her bilateral lower extremities have become swollen over the past 1-1/2 months.  Additionally reported chills, but denies any fevers or chest pain at this time.  She states that she has been using her rescue inhaler every few hours with temporary mild improvement.  Of note, patient was seen here in the ED on 3/4 for similar complaints where she was diagnosed with COPD exacerbation.  She was given albuterol, steroids, and discharged on 5-day course of prednisone.     She was noted wheezing on PE. New onset Afib noted on admission, on lovenox therapeutic dosage, will switch to Eliquis 5 mg twice daily  Procal neg  proBNP 489  CXR personally reviewed and interpreted: mild pulmonary edema or infiltrate  EKG personally reviewed and interpreted: Afib with rate 80s    Interval Problem Update  Patient was seen and examined at the bedside. No overnight events reported.  All Data, Medication data reviewed.  Case discussed with nursing, CM in IDT rounds.   Alert, awake, denies fever, chills, chest pain.  Wheezing noted, no sign respiratory distress, on 2 L oxygen.    Consultants/Specialty  none    Code Status  Full Code    Disposition  Likely [facility]    Review of Systems  Review of Systems   Constitutional: Positive for malaise/fatigue. Negative for chills  and fever.   HENT: Negative for ear discharge.    Eyes: Negative for blurred vision.   Respiratory: Positive for wheezing. Negative for cough and hemoptysis.    Cardiovascular: Negative for chest pain, palpitations and leg swelling.   Gastrointestinal: Negative for nausea and vomiting.   Genitourinary: Negative for dysuria.   Musculoskeletal: Negative for myalgias.   Skin: Negative for rash.   Neurological: Negative for focal weakness.   Endo/Heme/Allergies: Does not bruise/bleed easily.   Psychiatric/Behavioral: Negative for depression.        Physical Exam  Temp:  [36.4 °C (97.5 °F)-36.6 °C (97.9 °F)] 36.6 °C (97.9 °F)  Pulse:  [60-70] 67  Resp:  [16-19] 18  BP: ()/(45-67) 115/54  SpO2:  [93 %-97 %] 93 %    Physical Exam  Vitals reviewed.   Constitutional:       Appearance: Normal appearance. She is obese. She is ill-appearing.   HENT:      Head: Normocephalic and atraumatic.      Nose: Nose normal.      Mouth/Throat:      Mouth: Mucous membranes are dry.      Pharynx: Oropharynx is clear.   Eyes:      Extraocular Movements: Extraocular movements intact.      Conjunctiva/sclera: Conjunctivae normal.      Pupils: Pupils are equal, round, and reactive to light.   Cardiovascular:      Rate and Rhythm: Normal rate and regular rhythm.      Pulses: Normal pulses.      Heart sounds: Normal heart sounds.   Pulmonary:      Effort: Pulmonary effort is normal. No respiratory distress.      Breath sounds: Wheezing present.      Comments: Diminished breath sounds bilateral  Abdominal:      General: Abdomen is flat. Bowel sounds are normal.      Palpations: Abdomen is soft.      Tenderness: There is no abdominal tenderness.   Musculoskeletal:         General: Normal range of motion.      Cervical back: Normal range of motion and neck supple.   Skin:     General: Skin is warm and dry.   Neurological:      General: No focal deficit present.      Mental Status: She is alert and oriented to person, place, and time. Mental  status is at baseline.   Psychiatric:         Mood and Affect: Mood normal.         Behavior: Behavior normal.         Fluids    Intake/Output Summary (Last 24 hours) at 3/20/2021 1603  Last data filed at 3/20/2021 1328  Gross per 24 hour   Intake 830 ml   Output --   Net 830 ml       Laboratory  Recent Labs     03/18/21 2046 03/19/21  0514   WBC 15.1* 12.4*   RBC 4.14* 4.12*   HEMOGLOBIN 11.4* 11.6*   HEMATOCRIT 38.5 39.0   MCV 93.0 94.7   MCH 27.5 28.2   MCHC 29.6* 29.7*   RDW 52.8* 53.9*   PLATELETCT 267 261   MPV 11.5 11.4     Recent Labs     03/18/21 2046 03/19/21  0514   SODIUM 141 140   POTASSIUM 4.0 5.3   CHLORIDE 105 103   CO2 28 30   GLUCOSE 163* 222*   BUN 15 22   CREATININE 1.20 1.16   CALCIUM 9.5 9.5                   Imaging  EC-ECHOCARDIOGRAM COMPLETE W/ CONT   Final Result      DX-CHEST-PORTABLE (1 VIEW)   Final Result         1.  Mild pulmonary edema and/or infiltrates.   2.  Cardiomegaly           Assessment/Plan  * Acute respiratory failure (HCC)- (present on admission)  Assessment & Plan  Mild hypoxia on arrival, improved to 95% on 2L NC  Noted to have diffuse wheezing on physical exam  , Procal neg  CXR personally reviewed and interpreted: mild pulmonary edema or infiltrate    - c/w Solumedrol 40mg BID  - Duonebs and O2 therapy as needed, montelukast, Spiriva  - Incentive spirometry  - Azithro 500mg PO qd for COPD exacerbation  - Lasix as needed  - c/w home COPD medications  -RT protocol, wean oxygen as tolerated, I&O    A-fib (HCC)- (present on admission)  Assessment & Plan  New onset Afib on ECG  EKG personally reviewed and interpreted: Afib with rate 80s  Chadsvasc 2  Noted to have COPD, KATHIA, and Asthma  - TTE: Normal left ventricular systolic function, EF 60%. The right ventricle was normal in size and function.   - full dose lovenox, will switch to eliquis po  - TSH normal  - currently rate controlled    Acute exacerbation of chronic obstructive pulmonary disease (COPD) (HCC)-  (present on admission)  Assessment & Plan  Plan as above        Elevated brain natriuretic peptide (BNP) level- (present on admission)  Assessment & Plan    - TTE  - Conservative IVF use  - Daily weights, I/O  - Fluid restrict 1200 CC  - Low Na diet  - Lasix as needed      Class 3 severe obesity due to excess calories with body mass index (BMI) of 50.0 to 59.9 in adult (HCC)- (present on admission)  Assessment & Plan  I have counseled the patient on healthy diet and exercise to lose weight.       Hx of  Bipolar affective disorder, and schizophrenia- (present on admission)  Assessment & Plan  C/w Home meds         VTE prophylaxis: eliquis

## 2021-03-20 NOTE — FACE TO FACE
Face to Face Supporting Documentation - Home Health    The encounter with this patient was in whole or in part the primary reason for home health admission.    Date of encounter:   Patient:                    MRN:                       YOB: 2023  Lorene Haro  [id number]  1975     Home health to see patient for:  Skilled Nursing care for assessment, interventions & education    Skilled need for:  Exacerbation of Chronic Disease State COPD    Skilled nursing interventions to include:  Comment: debility    Homebound status evidenced by:  Need the aid of supportive devices such as crutches, canes, wheelchairs or walkers. Leaving home requires a considerable and taxing effort. There is a normal inability to leave the home.    Community Physician to provide follow up care: SHANKAR Gleason     Optional Interventions? No      I certify the face to face encounter for this home health care referral meets the CMS requirements and the encounter/clinical assessment with the patient was, in whole, or in part, for the medical condition(s) listed above, which is the primary reason for home health care. Based on my clinical findings: the service(s) are medically necessary, support the need for home health care, and the homebound criteria are met.  I certify that this patient has had a face to face encounter by myself.  Romy Gallardo M.D. - NPI: [id number]

## 2021-03-20 NOTE — PROGRESS NOTES
Pt complaining of sharp chest pain, denies radiation. STAT EKG ordered. Monitor tech denies any recent rhythm changes. Pt currently SR and asymptomatic.

## 2021-03-20 NOTE — CARE PLAN
Problem: Communication  Goal: The ability to communicate needs accurately and effectively will improve  Outcome: PROGRESSING AS EXPECTED     Problem: Safety  Goal: Will remain free from injury  Outcome: PROGRESSING AS EXPECTED  Goal: Will remain free from falls  Outcome: PROGRESSING AS EXPECTED     Problem: Venous Thromboembolism (VTW)/Deep Vein Thrombosis (DVT) Prevention:  Goal: Patient will participate in Venous Thrombosis (VTE)/Deep Vein Thrombosis (DVT)Prevention Measures  Outcome: PROGRESSING AS EXPECTED     Problem: Respiratory:  Goal: Respiratory status will improve  Outcome: PROGRESSING AS EXPECTED     Problem: Pain Management  Goal: Pain level will decrease to patient's comfort goal  Outcome: PROGRESSING AS EXPECTED

## 2021-03-20 NOTE — PROGRESS NOTES
Received bedside report from RN, pt care assumed, VSS, pt assessment complete. Pt AAOx4, with no complaint of pain at this time. On 2L O2, SPO2 95%, cardiac monitored SR,no signs of acute distress noted at this time. Plan of care discussed with pt and verbalizes no questions. Pt denies any additional needs at this time. Bed locked/in lowest position, bed alarm on, pt educated on fall risk and verbalized understanding, call light within reach, hourly rounding initiated.

## 2021-03-21 PROBLEM — R73.9 BLOOD GLUCOSE ELEVATED: Status: ACTIVE | Noted: 2021-03-21

## 2021-03-21 LAB
ANION GAP SERPL CALC-SCNC: 5 MMOL/L (ref 7–16)
BASOPHILS # BLD AUTO: 0.1 % (ref 0–1.8)
BASOPHILS # BLD: 0.01 K/UL (ref 0–0.12)
BUN SERPL-MCNC: 29 MG/DL (ref 8–22)
CALCIUM SERPL-MCNC: 9.3 MG/DL (ref 8.5–10.5)
CHLORIDE SERPL-SCNC: 99 MMOL/L (ref 96–112)
CO2 SERPL-SCNC: 32 MMOL/L (ref 20–33)
CREAT SERPL-MCNC: 0.94 MG/DL (ref 0.5–1.4)
EOSINOPHIL # BLD AUTO: 0 K/UL (ref 0–0.51)
EOSINOPHIL NFR BLD: 0 % (ref 0–6.9)
ERYTHROCYTE [DISTWIDTH] IN BLOOD BY AUTOMATED COUNT: 53.3 FL (ref 35.9–50)
GLUCOSE BLD-MCNC: 124 MG/DL (ref 65–99)
GLUCOSE BLD-MCNC: 129 MG/DL (ref 65–99)
GLUCOSE BLD-MCNC: 132 MG/DL (ref 65–99)
GLUCOSE BLD-MCNC: 180 MG/DL (ref 65–99)
GLUCOSE BLD-MCNC: 197 MG/DL (ref 65–99)
GLUCOSE SERPL-MCNC: 135 MG/DL (ref 65–99)
HCT VFR BLD AUTO: 37.1 % (ref 37–47)
HGB BLD-MCNC: 10.9 G/DL (ref 12–16)
IMM GRANULOCYTES # BLD AUTO: 0.08 K/UL (ref 0–0.11)
IMM GRANULOCYTES NFR BLD AUTO: 0.7 % (ref 0–0.9)
INR PPP: 1.08 (ref 0.87–1.13)
LITHIUM SERPL-MCNC: 0.9 MMOL/L (ref 0.6–1.2)
LYMPHOCYTES # BLD AUTO: 1.02 K/UL (ref 1–4.8)
LYMPHOCYTES NFR BLD: 9 % (ref 22–41)
MCH RBC QN AUTO: 27.6 PG (ref 27–33)
MCHC RBC AUTO-ENTMCNC: 29.4 G/DL (ref 33.6–35)
MCV RBC AUTO: 93.9 FL (ref 81.4–97.8)
MONOCYTES # BLD AUTO: 0.49 K/UL (ref 0–0.85)
MONOCYTES NFR BLD AUTO: 4.3 % (ref 0–13.4)
NEUTROPHILS # BLD AUTO: 9.75 K/UL (ref 2–7.15)
NEUTROPHILS NFR BLD: 85.9 % (ref 44–72)
NRBC # BLD AUTO: 0 K/UL
NRBC BLD-RTO: 0 /100 WBC
PLATELET # BLD AUTO: 225 K/UL (ref 164–446)
PMV BLD AUTO: 11.4 FL (ref 9–12.9)
POTASSIUM SERPL-SCNC: 4.4 MMOL/L (ref 3.6–5.5)
PROTHROMBIN TIME: 14.3 SEC (ref 12–14.6)
RBC # BLD AUTO: 3.95 M/UL (ref 4.2–5.4)
SODIUM SERPL-SCNC: 136 MMOL/L (ref 135–145)
WBC # BLD AUTO: 11.4 K/UL (ref 4.8–10.8)

## 2021-03-21 PROCEDURE — 700102 HCHG RX REV CODE 250 W/ 637 OVERRIDE(OP): Performed by: STUDENT IN AN ORGANIZED HEALTH CARE EDUCATION/TRAINING PROGRAM

## 2021-03-21 PROCEDURE — 82962 GLUCOSE BLOOD TEST: CPT | Mod: 91

## 2021-03-21 PROCEDURE — 700101 HCHG RX REV CODE 250: Performed by: STUDENT IN AN ORGANIZED HEALTH CARE EDUCATION/TRAINING PROGRAM

## 2021-03-21 PROCEDURE — 36415 COLL VENOUS BLD VENIPUNCTURE: CPT

## 2021-03-21 PROCEDURE — 94640 AIRWAY INHALATION TREATMENT: CPT

## 2021-03-21 PROCEDURE — 700111 HCHG RX REV CODE 636 W/ 250 OVERRIDE (IP): Performed by: STUDENT IN AN ORGANIZED HEALTH CARE EDUCATION/TRAINING PROGRAM

## 2021-03-21 PROCEDURE — 94760 N-INVAS EAR/PLS OXIMETRY 1: CPT

## 2021-03-21 PROCEDURE — 80178 ASSAY OF LITHIUM: CPT

## 2021-03-21 PROCEDURE — 85610 PROTHROMBIN TIME: CPT

## 2021-03-21 PROCEDURE — A9270 NON-COVERED ITEM OR SERVICE: HCPCS | Performed by: STUDENT IN AN ORGANIZED HEALTH CARE EDUCATION/TRAINING PROGRAM

## 2021-03-21 PROCEDURE — 770020 HCHG ROOM/CARE - TELE (206)

## 2021-03-21 PROCEDURE — 99233 SBSQ HOSP IP/OBS HIGH 50: CPT | Performed by: STUDENT IN AN ORGANIZED HEALTH CARE EDUCATION/TRAINING PROGRAM

## 2021-03-21 PROCEDURE — 80048 BASIC METABOLIC PNL TOTAL CA: CPT

## 2021-03-21 PROCEDURE — 85025 COMPLETE CBC W/AUTO DIFF WBC: CPT

## 2021-03-21 RX ORDER — PREDNISONE 20 MG/1
40 TABLET ORAL DAILY
Status: DISCONTINUED | OUTPATIENT
Start: 2021-03-22 | End: 2021-03-23 | Stop reason: HOSPADM

## 2021-03-21 RX ORDER — WARFARIN SODIUM 6 MG/1
6 TABLET ORAL DAILY
Status: DISCONTINUED | OUTPATIENT
Start: 2021-03-21 | End: 2021-03-22

## 2021-03-21 RX ORDER — METHYLPREDNISOLONE SODIUM SUCCINATE 40 MG/ML
40 INJECTION, POWDER, LYOPHILIZED, FOR SOLUTION INTRAMUSCULAR; INTRAVENOUS EVERY 12 HOURS
Status: COMPLETED | OUTPATIENT
Start: 2021-03-21 | End: 2021-03-21

## 2021-03-21 RX ADMIN — WARFARIN SODIUM 6 MG: 6 TABLET ORAL at 17:24

## 2021-03-21 RX ADMIN — APIXABAN 5 MG: 5 TABLET, FILM COATED ORAL at 05:20

## 2021-03-21 RX ADMIN — MONTELUKAST 10 MG: 10 TABLET, FILM COATED ORAL at 20:57

## 2021-03-21 RX ADMIN — DOCUSATE SODIUM 50 MG AND SENNOSIDES 8.6 MG 2 TABLET: 8.6; 5 TABLET, FILM COATED ORAL at 17:24

## 2021-03-21 RX ADMIN — ACETAMINOPHEN 650 MG: 325 TABLET, FILM COATED ORAL at 22:17

## 2021-03-21 RX ADMIN — TIOTROPIUM BROMIDE INHALATION SPRAY 5 MCG: 3.12 SPRAY, METERED RESPIRATORY (INHALATION) at 08:45

## 2021-03-21 RX ADMIN — LITHIUM CARBONATE 300 MG: 300 TABLET, FILM COATED, EXTENDED RELEASE ORAL at 05:20

## 2021-03-21 RX ADMIN — FUROSEMIDE 20 MG: 20 TABLET ORAL at 05:23

## 2021-03-21 RX ADMIN — METHYLPREDNISOLONE SODIUM SUCCINATE 40 MG: 40 INJECTION, POWDER, FOR SOLUTION INTRAMUSCULAR; INTRAVENOUS at 17:25

## 2021-03-21 RX ADMIN — ARIPIPRAZOLE 30 MG: 10 TABLET ORAL at 05:20

## 2021-03-21 RX ADMIN — IPRATROPIUM BROMIDE AND ALBUTEROL SULFATE 3 ML: .5; 2.5 SOLUTION RESPIRATORY (INHALATION) at 20:12

## 2021-03-21 RX ADMIN — AZITHROMYCIN MONOHYDRATE 500 MG: 250 TABLET ORAL at 05:20

## 2021-03-21 RX ADMIN — IPRATROPIUM BROMIDE AND ALBUTEROL SULFATE 3 ML: .5; 2.5 SOLUTION RESPIRATORY (INHALATION) at 11:05

## 2021-03-21 RX ADMIN — INSULIN HUMAN 2 UNITS: 100 INJECTION, SOLUTION PARENTERAL at 20:59

## 2021-03-21 RX ADMIN — INSULIN HUMAN 2 UNITS: 100 INJECTION, SOLUTION PARENTERAL at 11:42

## 2021-03-21 RX ADMIN — Medication 81 MG: at 05:20

## 2021-03-21 RX ADMIN — LITHIUM CARBONATE 600 MG: 300 TABLET, FILM COATED, EXTENDED RELEASE ORAL at 20:58

## 2021-03-21 RX ADMIN — GUAIFENESIN AND DEXTROMETHORPHAN 10 ML: 100; 10 SYRUP ORAL at 04:13

## 2021-03-21 RX ADMIN — IPRATROPIUM BROMIDE AND ALBUTEROL SULFATE 3 ML: .5; 2.5 SOLUTION RESPIRATORY (INHALATION) at 08:44

## 2021-03-21 RX ADMIN — METHYLPREDNISOLONE SODIUM SUCCINATE 40 MG: 40 INJECTION, POWDER, FOR SOLUTION INTRAMUSCULAR; INTRAVENOUS at 05:20

## 2021-03-21 RX ADMIN — IPRATROPIUM BROMIDE AND ALBUTEROL SULFATE 3 ML: .5; 2.5 SOLUTION RESPIRATORY (INHALATION) at 16:22

## 2021-03-21 ASSESSMENT — ENCOUNTER SYMPTOMS
DEPRESSION: 0
MYALGIAS: 0
BRUISES/BLEEDS EASILY: 0
COUGH: 0
VOMITING: 0
PALPITATIONS: 0
NAUSEA: 1
BLURRED VISION: 0
CHILLS: 0
FEVER: 0
FOCAL WEAKNESS: 0
HEMOPTYSIS: 0

## 2021-03-21 ASSESSMENT — PAIN DESCRIPTION - PAIN TYPE: TYPE: ACUTE PAIN

## 2021-03-21 NOTE — PROGRESS NOTES
12 hr cc          Monitor Summary:  SB-SR 57-87  Occasional PAC, frequent PAC, 6.4 seconds of afib  .16/.08/.40

## 2021-03-21 NOTE — PROGRESS NOTES
Received bedside report from RN, pt care assumed, VSS, pt assessment complete. Pt AAOx4, with no complaint of pain at this time. On 2L O2, SPO2 96%, cardiac monitored no signs of acute distress noted at this time. Plan of care discussed with pt and verbalizes no questions. Pt denies any additional needs at this time. Bed locked/in lowest position, pt educated on fall risk and verbalized understanding, call light within reach, hourly rounding initiated.

## 2021-03-21 NOTE — PROGRESS NOTES
Inpatient Anticoagulation Service Note    Date: 3/21/2021    Reason for Anticoagulation: Atrial Fibrillation   Target INR: 2.0 to 3.0  [id number] VASc Score: 1  HAS-BLED Score: 1   Hemoglobin Value: (!) 10.9  Hematocrit Value: 37.1  Lab Platelet Value: 225    INR from last 7 days     Date/Time INR Value    03/21/21 1225  1.08        Dose from last 7 days     Date/Time Dose (mg)    03/21/21 1200  6        Significant Interactions: Not Applicable  Bridge Therapy: No     Reversal Agent Administered: Not Applicable    Comment:  44 y/o F with PMHx of asthma and COPD, who presented 3/18/2021 with complaints of worsening shortness of breath x5 days.  03/18/21: EKG: Atrial Fibrillation, V-RATE  . DDI noted.  No documented signs of overt bleeding. INR below goal.     Plan:  Warfarin 6mg po daily, INR in AM.   Education Material Provided?: No  Pharmacist suggested discharge dosing: Warfarin 5-6 mg po daily, follow up with anticoagulation clinic after discharge.      Tirso Childers, PharmD, BCPS

## 2021-03-21 NOTE — DISCHARGE PLANNING
Anticipated Discharge Disposition: Home with HH and DME    Action: Choice obtained for HH ([facility]) and DME ([facility]). HH choice form sent to [facility].     Barriers to Discharge: Home oxygen study/orders pending.  Medical clearance    Plan: [facility] to follow up on HH orders. CM to send DME choice upon completion of Home O2 study and orders.

## 2021-03-21 NOTE — PROGRESS NOTES
[facility] Daily Progress Note    Date of Service  3/21/2021    Chief Complaint  45 y.o. female admitted 3/18/2021 with   Chief Complaint   Patient presents with   • Shortness of Breath     PT has HX of COPD and asthma. PT has had increased SOB x3days. PT used rescue inhaler at home with no releif. PT presented with SPO2 of 89% RA and expritory wheezing.          Hospital Course  45 y.o. female with history of asthma and COPD, who presented 3/18/2021 with complaints of worsening shortness of breath x5 days.  She additionally reports that for the past few days she has had a cough with white sputum, has become more short of breath when lying flat, and she has noticed that her bilateral lower extremities have become swollen over the past 1-1/2 months.  Additionally reported chills, but denies any fevers or chest pain at this time.  She states that she has been using her rescue inhaler every few hours with temporary mild improvement.  Of note, patient was seen here in the ED on 3/4 for similar complaints where she was diagnosed with COPD exacerbation.  She was given albuterol, steroids, and discharged on 5-day course of prednisone.     She was noted wheezing on PE. New onset Afib noted on admission, on lovenox therapeutic dosage, will switch to coumadin given less evidence of NOAC as her body weigth >120kg  Procal neg  proBNP 489  CXR personally reviewed and interpreted: mild pulmonary edema or infiltrate  EKG personally reviewed and interpreted: Afib with rate 80s    Interval Problem Update  Patient was seen and examined at the bedside. No overnight events reported.  All Data, Medication data reviewed.  Case discussed with nursing, NEREIDA in IDT rounds.   Alert, awake, denies fever, chills, chest pain.  Wheezing improivng, no sign respiratory distress, on 2 L oxygen.  O2 evaluation    Consultants/Specialty  none    Code Status  Full Code    Disposition  Likely [facility]    Review of Systems  Review of Systems    Constitutional: Positive for malaise/fatigue. Negative for chills and fever.   HENT: Negative for ear discharge.    Eyes: Negative for blurred vision.   Respiratory: Negative for cough and hemoptysis.    Cardiovascular: Negative for chest pain, palpitations and leg swelling.   Gastrointestinal: Positive for nausea. Negative for vomiting.   Genitourinary: Negative for dysuria.   Musculoskeletal: Negative for myalgias.   Skin: Negative for rash.   Neurological: Negative for focal weakness.   Endo/Heme/Allergies: Does not bruise/bleed easily.   Psychiatric/Behavioral: Negative for depression.        Physical Exam  Temp:  [36.2 °C (97.2 °F)-36.8 °C (98.3 °F)] 36.3 °C (97.3 °F)  Pulse:  [48-67] 64  Resp:  [16-20] 20  BP: (105-119)/(50-69) 114/57  SpO2:  [93 %-99 %] 95 %    Physical Exam  Vitals reviewed.   Constitutional:       Appearance: Normal appearance. She is obese. She is ill-appearing.   HENT:      Head: Normocephalic and atraumatic.      Nose: Nose normal.      Mouth/Throat:      Mouth: Mucous membranes are dry.      Pharynx: Oropharynx is clear.   Eyes:      Extraocular Movements: Extraocular movements intact.      Conjunctiva/sclera: Conjunctivae normal.      Pupils: Pupils are equal, round, and reactive to light.   Cardiovascular:      Rate and Rhythm: Normal rate and regular rhythm.      Pulses: Normal pulses.      Heart sounds: Normal heart sounds.   Pulmonary:      Effort: Pulmonary effort is normal. No respiratory distress.      Breath sounds: No wheezing.      Comments: Diminished breath sounds bilateral  Abdominal:      General: Abdomen is flat. Bowel sounds are normal.      Palpations: Abdomen is soft.      Tenderness: There is no abdominal tenderness.   Musculoskeletal:         General: Normal range of motion.      Cervical back: Normal range of motion and neck supple.   Skin:     General: Skin is warm and dry.   Neurological:      General: No focal deficit present.      Mental Status: She is alert  and oriented to person, place, and time. Mental status is at baseline.   Psychiatric:         Mood and Affect: Mood normal.         Behavior: Behavior normal.         Fluids    Intake/Output Summary (Last 24 hours) at 3/21/2021 1246  Last data filed at 3/21/2021 0800  Gross per 24 hour   Intake 400 ml   Output --   Net 400 ml       Laboratory  Recent Labs     03/18/21 2046 03/19/21  0514 03/21/21  0334   WBC 15.1* 12.4* 11.4*   RBC 4.14* 4.12* 3.95*   HEMOGLOBIN 11.4* 11.6* 10.9*   HEMATOCRIT 38.5 39.0 37.1   MCV 93.0 94.7 93.9   MCH 27.5 28.2 27.6   MCHC 29.6* 29.7* 29.4*   RDW 52.8* 53.9* 53.3*   PLATELETCT 267 261 225   MPV 11.5 11.4 11.4     Recent Labs     03/18/21 2046 03/19/21 0514 03/21/21  0334   SODIUM 141 140 136   POTASSIUM 4.0 5.3 4.4   CHLORIDE 105 103 99   CO2 28 30 32   GLUCOSE 163* 222* 135*   BUN 15 22 29*   CREATININE 1.20 1.16 0.94   CALCIUM 9.5 9.5 9.3                   Imaging  EC-ECHOCARDIOGRAM COMPLETE W/ CONT   Final Result      DX-CHEST-PORTABLE (1 VIEW)   Final Result         1.  Mild pulmonary edema and/or infiltrates.   2.  Cardiomegaly           Assessment/Plan  * Acute respiratory failure (HCC)- (present on admission)  Assessment & Plan  Mild hypoxia on arrival, improved to 95% on 2L NC  Noted to have diffuse wheezing on physical exam  , Procal neg  CXR personally reviewed and interpreted: mild pulmonary edema or infiltrate    - c/w Solumedrol 40mg BID, on prednisone taper  - Duonebs and O2 therapy as needed, montelukast, Spiriva  - Incentive spirometry  - Azithro 500mg PO qd for COPD exacerbation  - Lasix as needed  - c/w home COPD medications  -RT protocol, wean oxygen as tolerated, I&O    A-fib (HCC)- (present on admission)  Assessment & Plan  New onset Afib on ECG  EKG personally reviewed and interpreted: Afib with rate 80s  Chadsvasc 2  Noted to have COPD, KATHIA, and Asthma  - TTE: Normal left ventricular systolic function, EF 60%. The right ventricle was normal in size  and function.   - full dose lovenox, will switch to coumadin, not NOAC given her BMI 54 and weight is 130kg. Pharmacy to dose coumadin   - TSH normal  - currently rate controlled    Acute exacerbation of chronic obstructive pulmonary disease (COPD) ([facility])- (present on admission)  Assessment & Plan  Plan as above        Blood glucose elevated  Assessment & Plan  A1c 5.2 in 1/2021  Sec to steroid use  On SSI  Accucheck with hypoglycemic protocol    Elevated brain natriuretic peptide (BNP) level- (present on admission)  Assessment & Plan    - TTE  - Conservative IVF use  - Daily weights, I/O  - Fluid restrict 1200 CC  - Low Na diet  - Lasix as needed      Class 3 severe obesity due to excess calories with body mass index (BMI) of 50.0 to 59.9 in adult ([facility])- (present on admission)  Assessment & Plan  I have counseled the patient on healthy diet and exercise to lose weight.       Hx of  Bipolar affective disorder, and schizophrenia- (present on admission)  Assessment & Plan  C/w Home meds  [place] level normal       VTE prophylaxis: coumadin

## 2021-03-21 NOTE — DISCHARGE PLANNING
Received Choice form at 8928  Agency/Facility Name: [facility]  Referral sent per Choice form @ 7803

## 2021-03-22 ENCOUNTER — PATIENT OUTREACH (OUTPATIENT)
Dept: HEALTH INFORMATION MANAGEMENT | Facility: OTHER | Age: 46
End: 2021-03-22

## 2021-03-22 ENCOUNTER — APPOINTMENT (OUTPATIENT)
Dept: SLEEP MEDICINE | Facility: MEDICAL CENTER | Age: 46
End: 2021-03-22
Payer: MEDICAID

## 2021-03-22 ENCOUNTER — PHARMACY VISIT (OUTPATIENT)
Dept: PHARMACY | Facility: MEDICAL CENTER | Age: 46
End: 2021-03-22
Payer: COMMERCIAL

## 2021-03-22 LAB
GLUCOSE BLD-MCNC: 114 MG/DL (ref 65–99)
GLUCOSE BLD-MCNC: 125 MG/DL (ref 65–99)
GLUCOSE BLD-MCNC: 126 MG/DL (ref 65–99)
GLUCOSE BLD-MCNC: 142 MG/DL (ref 65–99)
GLUCOSE BLD-MCNC: 94 MG/DL (ref 65–99)
INR PPP: 1.04 (ref 0.87–1.13)
PROTHROMBIN TIME: 13.9 SEC (ref 12–14.6)

## 2021-03-22 PROCEDURE — 700102 HCHG RX REV CODE 250 W/ 637 OVERRIDE(OP): Performed by: STUDENT IN AN ORGANIZED HEALTH CARE EDUCATION/TRAINING PROGRAM

## 2021-03-22 PROCEDURE — A9270 NON-COVERED ITEM OR SERVICE: HCPCS | Performed by: STUDENT IN AN ORGANIZED HEALTH CARE EDUCATION/TRAINING PROGRAM

## 2021-03-22 PROCEDURE — RXMED WILLOW AMBULATORY MEDICATION CHARGE: Performed by: STUDENT IN AN ORGANIZED HEALTH CARE EDUCATION/TRAINING PROGRAM

## 2021-03-22 PROCEDURE — 700111 HCHG RX REV CODE 636 W/ 250 OVERRIDE (IP): Performed by: STUDENT IN AN ORGANIZED HEALTH CARE EDUCATION/TRAINING PROGRAM

## 2021-03-22 PROCEDURE — 36415 COLL VENOUS BLD VENIPUNCTURE: CPT

## 2021-03-22 PROCEDURE — 770020 HCHG ROOM/CARE - TELE (206)

## 2021-03-22 PROCEDURE — 82962 GLUCOSE BLOOD TEST: CPT | Mod: 91

## 2021-03-22 PROCEDURE — 85610 PROTHROMBIN TIME: CPT

## 2021-03-22 PROCEDURE — 99232 SBSQ HOSP IP/OBS MODERATE 35: CPT | Performed by: STUDENT IN AN ORGANIZED HEALTH CARE EDUCATION/TRAINING PROGRAM

## 2021-03-22 RX ORDER — PREDNISONE 20 MG/1
40 TABLET ORAL DAILY
Qty: 8 TABLET | Refills: 0 | Status: SHIPPED | OUTPATIENT
Start: 2021-03-22 | End: 2021-03-26

## 2021-03-22 RX ORDER — WARFARIN SODIUM 6 MG/1
6 TABLET ORAL DAILY
Status: DISCONTINUED | OUTPATIENT
Start: 2021-03-23 | End: 2021-03-23 | Stop reason: HOSPADM

## 2021-03-22 RX ORDER — WARFARIN SODIUM 6 MG/1
6 TABLET ORAL DAILY
Qty: 30 TABLET | Refills: 0 | Status: SHIPPED | OUTPATIENT
Start: 2021-03-22 | End: 2021-04-08 | Stop reason: SDUPTHER

## 2021-03-22 RX ADMIN — Medication 81 MG: at 05:12

## 2021-03-22 RX ADMIN — PREDNISONE 40 MG: 20 TABLET ORAL at 05:11

## 2021-03-22 RX ADMIN — WARFARIN SODIUM 9 MG: 6 TABLET ORAL at 18:28

## 2021-03-22 RX ADMIN — LITHIUM CARBONATE 600 MG: 300 TABLET, FILM COATED, EXTENDED RELEASE ORAL at 20:42

## 2021-03-22 RX ADMIN — DOCUSATE SODIUM 50 MG AND SENNOSIDES 8.6 MG 2 TABLET: 8.6; 5 TABLET, FILM COATED ORAL at 05:12

## 2021-03-22 RX ADMIN — TIOTROPIUM BROMIDE INHALATION SPRAY 5 MCG: 3.12 SPRAY, METERED RESPIRATORY (INHALATION) at 09:32

## 2021-03-22 RX ADMIN — FUROSEMIDE 20 MG: 20 TABLET ORAL at 05:12

## 2021-03-22 RX ADMIN — MONTELUKAST 10 MG: 10 TABLET, FILM COATED ORAL at 20:42

## 2021-03-22 RX ADMIN — LITHIUM CARBONATE 300 MG: 300 TABLET, FILM COATED, EXTENDED RELEASE ORAL at 05:12

## 2021-03-22 RX ADMIN — ACETAMINOPHEN 650 MG: 325 TABLET, FILM COATED ORAL at 04:31

## 2021-03-22 RX ADMIN — ARIPIPRAZOLE 30 MG: 10 TABLET ORAL at 05:13

## 2021-03-22 SDOH — ECONOMIC STABILITY: FOOD INSECURITY: WITHIN THE PAST 12 MONTHS, THE FOOD YOU BOUGHT JUST DIDN'T LAST AND YOU DIDN'T HAVE MONEY TO GET MORE.: SOMETIMES TRUE

## 2021-03-22 SDOH — ECONOMIC STABILITY: FOOD INSECURITY: WITHIN THE PAST 12 MONTHS, YOU WORRIED THAT YOUR FOOD WOULD RUN OUT BEFORE YOU GOT MONEY TO BUY MORE.: SOMETIMES TRUE

## 2021-03-22 ASSESSMENT — PAIN DESCRIPTION - PAIN TYPE: TYPE: ACUTE PAIN

## 2021-03-22 ASSESSMENT — SOCIAL DETERMINANTS OF HEALTH (SDOH): HOW HARD IS IT FOR YOU TO PAY FOR THE VERY BASICS LIKE FOOD, HOUSING, MEDICAL CARE, AND HEATING?: SOMEWHAT HARD

## 2021-03-22 NOTE — DISCHARGE PLANNING
Anticipated Discharge Disposition: Home with HH and DME     Action: Pt has been declined by the following HH companies:  - [facility]- no Medicaid availability   - [facility]- no Medicaid availability  - [place]- no Medicaid availability  - [facility]- Insufficient staffing  There are no further HH options for pts w/ Medicaid.     Pt has been accepted w/ [facility] for Home O[facility].  BS RN confirmed that pt's O2 has been delivered.    LSW met w/ pt at bedside to discuss possible SNF placement which is what is currently being recommended by PT/OT.  Pt adamantly refused SNF placement.  Hospitalist and Hospitalist RN informed.    Pt reports being established with care giving services through [facility] ([place], [place]), which provides programs for persons w/ intellectual disabilities.  Her  there is [facility] [phone].  LSW called [facility], he confirmed that she has assistance for 8hrs/day,5 days/wk but only for daily living activities and they are not able to assist w/ anything medical.  [facility] says that pt's mom is home and available, she mostly assists w/ transportation.    Pt also reports having a rep-payee named [facility] [phone].    Barriers to Discharge: HH acceptance, medical clearance     Plan: LSW will f/u w/ medical team, pt, and pt's family for dc plan.

## 2021-03-22 NOTE — DISCHARGE PLANNING
Agency/Facility Name: [facility]   Spoke To: Arlette  Outcome: [facility] stated that the clinical team is in a meeting and will f/u with this DPA when referral is reviewed

## 2021-03-22 NOTE — PROGRESS NOTES
Assumed care this pm from day shift RN. Patient sitting up in the chair. Patient AxO 4, O2 @ 1 L thru nasal cannula, VSS. Call bell and personal items within reach, bed locked in low position. Hourly rounding in place. Tele box in place, monitor room notified.

## 2021-03-22 NOTE — RESPIRATORY CARE
REMOTE MONITORING PROGRAM by COPD NAVIGATOR  3/22/2021 at 12:19 PM by Joana Ireland, RRT     Patient interviewed by COPD Navigator. Unable to download [facility] [employer] luis antonio to patient to participate in COPD remote monitoring.     Verified with patient on [facility] appointment on 4/30 at 2 pm with billy Whitfield.

## 2021-03-22 NOTE — DISCHARGE PLANNING
Received Choice form at 7338  Agency/Facility Name: [facility]  Referral sent per Choice form @ 1075

## 2021-03-22 NOTE — FACE TO FACE
"Face to Face Note  -  Durable Medical Equipment    Romy Gallardo M.D. - NPI: [id number]  I certify that this patient is under my care and that they had a durable medical equipment(DME)face to face encounter by myself that meets the physician DME face-to-face encounter requirements with this patient on:    Date of encounter:   Patient:                    MRN:                       YOB: 2023  Lorene Haro  [id number]  1975     The encounter with the patient was in whole, or in part, for the following medical condition, which is the primary reason for durable medical equipment:  COPD    I certify that, based on my findings, the following durable medical equipment is medically necessary:  Oxygen.    HOME O2 Saturation Measurements:(Values must be present for Home Oxygen orders)  Room air sat at rest: 91  Room air sat with amb: 87  With liters of O2: 0, O2 sat at rest with O2: 91  With Liters of O2: 1, O2 sat with amb with O2 : 90  Is the patient mobile?: Yes    My Clinical findings support the need for the above equipment due to:  Hypoxia    Supporting Symptoms: The patient requires supplemental oxygen, as the following interventions have been tried with limited or no improvement: \"Bronchodilators and/or steroid inhalers, \"Oral and/or IV steroids and \"Ambulation with oximetry    If patient feels more short of breath, they can go up to 6 liters per minute and contact healthcare provider.  "

## 2021-03-22 NOTE — DISCHARGE PLANNING
Agency/Facility Name: [facility]  Spoke To: Simon  Outcome: Medicaid Census full. No Medicaid openings until next week

## 2021-03-22 NOTE — DISCHARGE PLANNING
Anticipated Discharge Disposition: Home with HH and DME     Action: HH choice was obtained for [facility], referral is still pending.    DME choice was obtained for [facility] (via previous chart note), LSW confirmed that there are now current O2 orders and face to face.  Choice form sent to GISELE Drake.      Barriers to Discharge: HH and DME acceptance pending     Plan: DPA to follow up on HH and DME orders      1000 UPDATE:  Pt was declined by [facility] due to no availability for [facility] FFS.  Pt discussed w/ Carri from [facility], they have no Medicaid FFS openings.    LSW met w/ pt at bedside, pt provided verbal consent for [facility] .  Choice form faxed to GISELE Drake.

## 2021-03-22 NOTE — DISCHARGE PLANNING
Meds-to-Beds: Discharge prescription orders listed below delivered to patient's bedside. RN Ellen notified. Patient counseled. Provided warfarin new education booklet. Reviewed signs/symptoms of bleeding and when to seek medical attention. Reviewed dietary restrictions and potential drug-drug interactions. Educated patient on importance of follow-up for INR monitoring.     Lorene Haro Bonnie   Home Medication Instructions TRACIE:[id number]    Printed on:03/22/21 1142   Medication Information                      predniSONE (DELTASONE) 20 MG Tab  Take 2 Tablets by mouth every day for 4 days.             warfarin (COUMADIN) 6 MG Tab  Take 1 tablet by mouth every day for 30 days.                 Jenniffer Riley, PharmD

## 2021-03-22 NOTE — DISCHARGE PLANNING
Agency/Facility Name: [facility]  Spoke To: Lacho  Outcome: Lacho states the pt is on service with [facility]    Agency/Facility Name: [facility]  Spoke To: Rosalba  Outcome: Rosalba states that the pt is on service with [facility] and home o2 is set up. Referral resent to [facility] for transport tank

## 2021-03-22 NOTE — PROGRESS NOTES
Inpatient Anticoagulation Service Note    Date: 3/22/2021    Reason for Anticoagulation: Atrial Fibrillation   Target INR: 2.0 to 3.0  [id number] VASc Score: 1  HAS-BLED Score: 1   Hemoglobin Value: (!) 10.9  Hematocrit Value: 37.1  Lab Platelet Value: 225    INR from last 7 days     Date/Time INR Value    03/22/21 0425  1.04    03/21/21 1225  1.08        Dose from last 7 days     Date/Time Dose (mg)    03/22/21 1350  9    03/21/21 1200  6        Average Dose (mg): New Start  Significant Interactions: Aspirin, Corticosteroids  Bridge Therapy: No  Reversal Agent Administered: Not Applicable    Comments: Warfarin initiated for newly diagnosed afib. INR remains sub-therapeutic after one dose last night. NNLs today but no s/s of overt bleeding. DDIs stable. Patient planning on DCing today. Instructed patient to take one time dose of warfarin 9 mg tonight then continue warfarin 6 mg PO daily starting tomorrow. INR ordered for tomorrow AM if patient remains inpatient. Pharmacy will continue to monitor.    Plan:  Warfarin 9 mg on 3/22 then resume warfarin 6 mg PO daily  Education Material Provided?: Yes (By [email or web address] Pharmacist 3/22)  Pharmacist suggested discharge dosing: Warfarin 9 mg PO once on 3/22 then resume warfarin 6 mg PO daily with close follow-up post-discharge with an INR within 48 to 72 hours.       Thank you!    Denia Gregory, PharmD, BCPS

## 2021-03-22 NOTE — PROGRESS NOTES
Community Health Worker Intake  • Social determinates of health intake Complete.   • Identified barriers to Food.  • Contact information provided to Lorene Bonnie Haro   • Has PCP appointment scheduled   • Accepted Meds-To-Beds.   • Inpatient assessment completed.    ROSA MARIA Pickens met with pt bedside to re-introduce CCM program. Pt states a need for food. Pt states she has a PCP appointment scheduled. Pt identifies no other barriers to resources and expressed no other needs at this time.     Plan: CHW will assist pt with food and follow up with pt after d/c

## 2021-03-22 NOTE — DISCHARGE PLANNING
Received Choice form at 7663  Agency/Facility Name: [facility]  Referral sent per Choice form @ 2956

## 2021-03-22 NOTE — DISCHARGE SUMMARY
Discharge Summary    CHIEF COMPLAINT ON ADMISSION  Chief Complaint   Patient presents with   • Shortness of Breath     PT has HX of COPD and asthma. PT has had increased SOB x3days. PT used rescue inhaler at home with no releif. PT presented with SPO2 of 89% RA and expritory wheezing.        Reason for Admission  EMS     Admission Date  3/18/2021    CODE STATUS  Full Code    HPI & HOSPITAL COURSE  45 y.o. female with history of asthma and COPD, who presented 3/18/2021 with complaints of worsening shortness of breath x5 days.      She was noted wheezing on PE. Procal neg. ProBNP 489. CXR personally reviewed and interpreted: mild pulmonary edema or infiltrate. She received steroid, abx azithromycin, lasix po, IS and RT breathing treatment. Wheezing have been improving. She is discharged with po prednisone 40mg daily for 4 days.    Oxygen evaluation showed:   Room air sat at rest: 91  Room air sat with amb: 87  With liters of O2: 0, O2 sat at rest with O2: 91  With Liters of O2: 1, O2 sat with amb with O2 : 90  Is the patient mobile?: Yes    Home oxygen has been ordered.     New onset Afib noted on admission, Chadsvasc 2. TTE: Normal left ventricular systolic function, EF 60%. The right ventricle was normal in size and function. TSH normal. She was on lovenox therapeutic dosage, and was switched to coumadin given less evidence of NOAC as her body weight >120kg and BMI 54.we had no home health options for this patient. She was offered SNF but she refused. She was medically competent make this decision.    Therefore, she is discharged in fair and stable condition to home with close outpatient follow-up. She is advised to follow up with PCP, cardiology and coumadin clinic.     The patient met 2-midnight criteria for an inpatient stay at the time of discharge.    Discharge Date  3/23/2021    FOLLOW UP ITEMS POST DISCHARGE  Cardiology  PCP  Coumadin clinic    DISCHARGE DIAGNOSES  Principal Problem:    Acute respiratory  failure (HCC) POA: Yes  Active Problems:    Acute exacerbation of chronic obstructive pulmonary disease (COPD) ([facility]) POA: Yes    A-fib ([facility]) POA: Yes    Elevated brain natriuretic peptide (BNP) level POA: Yes    Blood glucose elevated POA: Unknown    Hx of  Bipolar affective disorder, and schizophrenia POA: Yes    Class 3 severe obesity due to excess calories with body mass index (BMI) of 50.0 to 59.9 in adult ([facility]) POA: Yes  Resolved Problems:    * No resolved hospital problems. *      FOLLOW UP  Future Appointments   Date Time Provider Department Center   3/22/2021  9:20 AM CRISTOBAL Coronado [facility] None   7/14/2021  2:00 PM Oz Borrego M.D. [facility] None     SHANKAR Gleason  [address] [facility] [address]  [phone]    On 3/31/2021  Please go to this office for your Primary Care appointment at 1:45pm.    Oz Borrego M.D.  [address] [address]  [phone]    In 2 weeks        MEDICATIONS ON DISCHARGE     Medication List      START taking these medications      Instructions   warfarin 6 MG Tabs  Commonly known as: COUMADIN   Doctor's comments: Adjust coumadin dose per INR. INR target 2-3  Take 1 tablet by mouth every day for 30 days.  Dose: 6 mg        CHANGE how you take these medications      Instructions   predniSONE 20 MG Tabs  What changed:   · medication strength  · how much to take  Commonly known as: DELTASONE   Take 2 Tablets by mouth every day for 4 days.  Dose: 40 mg        CONTINUE taking these medications      Instructions   Abilify 30 MG tablet  Generic drug: aripiprazole   Take 30 mg by mouth every morning.  Dose: 30 mg     atorvastatin 20 MG Tabs  Commonly known as: LIPITOR   Take 1 Tab by mouth every day.  Dose: 20 mg     B-1 100 MG Tabs   Take 1 Tablet by mouth every day.  Dose: 100 mg     furosemide 20 MG Tabs  Commonly known as: LASIX   Take 1 Tab by mouth every day.  Dose: 20 mg     ipratropium-albuterol 0.5-2.5 (3) MG/3ML nebulizer  solution  Commonly known as: DUONEB   3 mL by Nebulization route every 6 hours as needed for Shortness of Breath.  Dose: 3 mL     lithium  MG Tbcr tablet  Commonly known as: Lithobid   Take 300-600 mg by mouth 2 Times a Day. 300 mg in AM and 600 mg at bedtime  Dose: 300-600 mg     montelukast 10 MG Tabs  Commonly known as: SINGULAIR   Take 1 Tab by mouth every bedtime.  Dose: 10 mg     nystatin powder  Commonly known as: MYCOSTATIN   Apply underneath breasts and on inner thigh area twice a day for fungal infection     Spiriva HandiHaler 18 MCG Caps  Generic drug: tiotropium   Inhale 1 Cap by mouth every day.  Dose: 18 mcg     Stool Softener Plus Laxative 8.6-50 MG Tabs  Generic drug: senna-docusate   Take 1 Tab by mouth every day.  Dose: 1 tablet     Ventolin  (90 Base) MCG/ACT Aers inhalation aerosol  Generic drug: albuterol   Inhale 2 Puffs every 6 hours as needed for Shortness of Breath.  Dose: 2 Puff        STOP taking these medications    aspirin EC 81 MG Tbec  Commonly known as: ECOTRIN     miconazole 2 % Crea  Commonly known as: MICOTIN     sulfamethoxazole-trimethoprim 800-160 MG tablet  Commonly known as: BACTRIM DS            Allergies  Allergies   Allergen Reactions   • Amoxicillin Rash and Itching     Tolerates cephalosporins, pt reports that she gets a rash all over her body and gets itchy   • Penicillin G Rash and Itching     pt reports that she gets a rash all over her body and gets itchy       DIET  Orders Placed This Encounter   Procedures   • Diet Order Diet: Cardiac (2 gram sodium); Second Modifier: (optional): Consistent CHO (Diabetic); Fluid modifications: (optional): 1200 ml Fluid Restriction     Standing Status:   Standing     Number of Occurrences:   1     Order Specific Question:   Diet:     Answer:   Cardiac [6]     Comments:   2 gram sodium     Order Specific Question:   Second Modifier: (optional)     Answer:   Consistent CHO (Diabetic) [4]     Order Specific Question:    Fluid modifications: (optional)     Answer:   1200 ml Fluid Restriction [8]       ACTIVITY  As tolerated.  Weight bearing as tolerated    CONSULTATIONS  3/22/2021    PROCEDURES  none    LABORATORY  Lab Results   Component Value Date    SODIUM 136 03/21/2021    POTASSIUM 4.4 03/21/2021    CHLORIDE 99 03/21/2021    CO2 32 03/21/2021    GLUCOSE 135 (H) 03/21/2021    BUN 29 (H) 03/21/2021    CREATININE 0.94 03/21/2021    CREATININE 0.9 09/11/2008        Lab Results   Component Value Date    WBC 11.4 (H) 03/21/2021    HEMOGLOBIN 10.9 (L) 03/21/2021    HEMATOCRIT 37.1 03/21/2021    PLATELETCT 225 03/21/2021        Total time of the discharge process exceeds 40 minutes.

## 2021-03-22 NOTE — DISCHARGE PLANNING
Received Choice form at 1400  Agency[facility]Facility Name: [facility]  Referral sent per Choice form @ 1400

## 2021-03-22 NOTE — CARE PLAN
Respiratory Update    Treatment modality: QID  Frequency: Duoneb    Pt tolerating current treatments well with no adverse reactions.

## 2021-03-22 NOTE — PROGRESS NOTES
Report received at bedside from NOC RN, pt care assumed, tele box on. Pt aaox4, no signs of distress noted at this time. Patient sitting up in chair. POC discussed with pt and verbalizes no questions. Pt denies any additional needs at this time. Bed in lowest position, pt educated on fall risk and verbalized understanding, call light within reach, bed alarm plugged in and on.

## 2021-03-22 NOTE — CARE PLAN
Problem: Communication  Goal: The ability to communicate needs accurately and effectively will improve  Outcome: PROGRESSING AS EXPECTED     Problem: Safety  Goal: Will remain free from injury  Outcome: PROGRESSING AS EXPECTED  Goal: Will remain free from falls  Outcome: PROGRESSING AS EXPECTED     Problem: Respiratory:  Goal: Respiratory status will improve  Outcome: PROGRESSING AS EXPECTED  Intervention: Educate and encourage coughing and deep breathing  Note: Patient encouraged to be up in the chair. Patient educated on deep breathing and coughing.

## 2021-03-23 VITALS
OXYGEN SATURATION: 94 % | BODY MASS INDEX: 54.07 KG/M2 | HEIGHT: 61 IN | DIASTOLIC BLOOD PRESSURE: 58 MMHG | RESPIRATION RATE: 20 BRPM | SYSTOLIC BLOOD PRESSURE: 120 MMHG | WEIGHT: 286.38 LBS | TEMPERATURE: 97 F | HEART RATE: 55 BPM

## 2021-03-23 LAB
GLUCOSE BLD-MCNC: 142 MG/DL (ref 65–99)
INR PPP: 1.08 (ref 0.87–1.13)
PROTHROMBIN TIME: 14.4 SEC (ref 12–14.6)

## 2021-03-23 PROCEDURE — 700102 HCHG RX REV CODE 250 W/ 637 OVERRIDE(OP): Performed by: STUDENT IN AN ORGANIZED HEALTH CARE EDUCATION/TRAINING PROGRAM

## 2021-03-23 PROCEDURE — 700111 HCHG RX REV CODE 636 W/ 250 OVERRIDE (IP): Performed by: STUDENT IN AN ORGANIZED HEALTH CARE EDUCATION/TRAINING PROGRAM

## 2021-03-23 PROCEDURE — A9270 NON-COVERED ITEM OR SERVICE: HCPCS | Performed by: STUDENT IN AN ORGANIZED HEALTH CARE EDUCATION/TRAINING PROGRAM

## 2021-03-23 PROCEDURE — 36415 COLL VENOUS BLD VENIPUNCTURE: CPT

## 2021-03-23 PROCEDURE — 94760 N-INVAS EAR/PLS OXIMETRY 1: CPT

## 2021-03-23 PROCEDURE — 94640 AIRWAY INHALATION TREATMENT: CPT

## 2021-03-23 PROCEDURE — 99239 HOSP IP/OBS DSCHRG MGMT >30: CPT | Performed by: HOSPITALIST

## 2021-03-23 PROCEDURE — 85610 PROTHROMBIN TIME: CPT

## 2021-03-23 PROCEDURE — 82962 GLUCOSE BLOOD TEST: CPT

## 2021-03-23 RX ADMIN — TIOTROPIUM BROMIDE INHALATION SPRAY 5 MCG: 3.12 SPRAY, METERED RESPIRATORY (INHALATION) at 06:55

## 2021-03-23 RX ADMIN — PREDNISONE 40 MG: 20 TABLET ORAL at 05:51

## 2021-03-23 RX ADMIN — Medication 81 MG: at 06:00

## 2021-03-23 RX ADMIN — LITHIUM CARBONATE 300 MG: 300 TABLET, FILM COATED, EXTENDED RELEASE ORAL at 05:52

## 2021-03-23 RX ADMIN — DOCUSATE SODIUM 50 MG AND SENNOSIDES 8.6 MG 2 TABLET: 8.6; 5 TABLET, FILM COATED ORAL at 05:52

## 2021-03-23 RX ADMIN — FUROSEMIDE 20 MG: 20 TABLET ORAL at 05:52

## 2021-03-23 RX ADMIN — ARIPIPRAZOLE 30 MG: 10 TABLET ORAL at 05:51

## 2021-03-23 ASSESSMENT — ENCOUNTER SYMPTOMS
CHILLS: 0
BLURRED VISION: 0
HEMOPTYSIS: 0
DEPRESSION: 0
PALPITATIONS: 0
NAUSEA: 1
BRUISES/BLEEDS EASILY: 0
VOMITING: 0
MYALGIAS: 0
COUGH: 0
FOCAL WEAKNESS: 0
FEVER: 0

## 2021-03-23 NOTE — DISCHARGE PLANNING
Received Choice form at 1200  Agency/Facility Name: [facility]  Referral sent per Choice form @ 1200    Received Choice form at 1025  Agency/Facility Name: [facility]  Referral sent per Choice form @ 1027

## 2021-03-23 NOTE — PROGRESS NOTES
Patient escorted via wheelchair to car to go home with mother. IV and tele box removed, all belongings gathered and sent home with patient. Patient a&ox4, educated on medications and where provided at bedside 3/22/21. Gave paperwork to patient to call oxygen company at home and home O2 sent with patient. Reeducated on use of oxygen tank. Verbalized understanding and denied any questions or additional needs at this time.    Per Dr. Soria and case management patient clear to discharge with support at home.

## 2021-03-23 NOTE — CARE PLAN
Problem: Communication  Goal: The ability to communicate needs accurately and effectively will improve  Outcome: PROGRESSING AS EXPECTED     Problem: Safety  Goal: Will remain free from injury  Outcome: PROGRESSING AS EXPECTED     Problem: Fluid Volume:  Goal: Will maintain balanced intake and output  Outcome: PROGRESSING AS EXPECTED

## 2021-03-23 NOTE — PROGRESS NOTES
Report received at bedside from NOC RN, pt care assumed, tele box on. Pt [id number], no signs of distress noted at this time. Patient resting comfortably in bed. POC discussed with pt and verbalizes no questions.  Pt denies any additional needs at this time. Bed in lowest position, pt educated on fall risk and verbalized understanding, call light within reach.

## 2021-03-23 NOTE — DISCHARGE INSTRUCTIONS
Discharge Instructions per Romy Gallardo M.D.    1. Please follow up with coumadin clinic for coumadin INR monitoring. INR target 2-3  2. Please follow-up with cardiology as outpatient and PCP as outpatient.    DIET: cardiac diet    ACTIVITY: As tolerated    DIAGNOSIS: acute hypoxic respiratory failure, COPD, KATHIA, morbid obesity, new onset Afib, HTN    Return to ER in the event of new or worsening symptoms. Please note importance of compliance and the patient has agreed to proceed with all medical recommendations and follow up plan indicated above. All medications come with benefits and risks. Risks may include permanent injury or death and these risks can be minimized with close reassessment and monitoring. Please make it to your scheduled follow ups with cardiology and PCP    Discharge Instructions    Discharged to home by car with relative. Discharged via wheelchair, hospital escort: Yes.  Special equipment needed: Oxygen    Be sure to schedule a follow-up appointment with your primary care doctor or any specialists as instructed.     Discharge Plan:   Diet Plan: Discussed  Activity Level: Discussed  Confirmed Follow up Appointment: Appointment Scheduled  Confirmed Symptoms Management: Discussed  Medication Reconciliation Updated: Yes  Influenza Vaccine Indication: Not indicated: Previously immunized this influenza season and > 8 years of age    I understand that a diet low in cholesterol, fat, and sodium is recommended for good health. Unless I have been given specific instructions below for another diet, I accept this instruction as my diet prescription.   Other diet: Low sodium, diabetic    Special Instructions: Chronic Obstructive Pulmonary Disease (COPD) is a long-term, progressive lung disease that makes it harder to breathe. It includes chronic bronchitis, emphysema, and refractory (non-reversible) asthma. With COPD, the airways in your lungs become inflamed and thicken, and the tissue where oxygen is  exchanged is destroyed. The flow of air in and out of your lungs decreases. When that happens, less oxygen gets into your body tissues, and it becomes harder to get rid of the waste gas carbon dioxide. As the disease gets worse, shortness of breath makes it harder to remain active. There is no cure for COPD, but it is preventable and treatable.    COPD Patient Discharge Instructions    • Diet  o Follow a low fat, low cholesterol, low salt diet unless instructed otherwise by your Doctor. Read the labels on the back of food products and track your intake of fat, cholesterol and salt.  • No smoking  o Discontinuing smoking will have the biggest impact on preventing progression of disease.  o To participate in [email or web address]’s Quit Tobacco Program, call 431-1642 or visit [email or web address]  • Oxygen  o If your doctor has order that you wear oxygen at home, it is important to wear it as ordered.  o Do not smoke, vape, or use e-cigarettes with oxygen on.  o Store in an appropriate location: upright in its art or laid flat down, away from open flames and stoves.   o Do not use oil-based creams and moisturizers (ie: petroleum products, oil-based lip moisturizers) or aerosol sprays (ie: hair sprays or deodorants) when using your oxygen equipment.  o Be careful with tubing placement to prevent yourself and others from tripping.  • Medications  o Refer to your personalized Action Plan to manage your symptoms.  • Warning signs of an exacerbation  o Breathing fast and shallow, worsening shortness of breath (like you just finished exercising)  o Chest tightness  o Increases in sputum production  o Changes in sputum color  o Lower oxygen levels than baseline  • When to call your doctor  o If the warning signs of an exacerbation do not improve  o Refer to your personalized Action Plan   • Pulmonary Rehab  o Your doctor has ordered you a referral to Pulmonary Rehab. Call [phone] to schedule an appointment  • Attend your  follow-up appointment with your PCP and/or Pulmonologist  See the educational handout provided by your COPD Navigator for more information. This also explains more about COPD, symptoms of an exacerbation, and some of the tests that you have undergone.    · Is patient discharged on Warfarin / Coumadin?   Yes    You are receiving the drug warfarin. Please understand the importance of monitoring warfarin with scheduled PT/INR blood draws.  Follow-up with the [facility] on 3/24 for INR lab..    IMPORTANT: HOW TO USE THIS INFORMATION:  This is a summary and does NOT have all possible information about this product. This information does not assure that this product is safe, effective, or appropriate for you. This information is not individual medical advice and does not substitute for the advice of your health care professional. Always ask your health care professional for complete information about this product and your specific health needs.      WARFARIN - ORAL (WARF-uh-rin)      COMMON BRAND NAME(S): Coumadin      WARNING:  Warfarin can cause very serious (possibly fatal) bleeding. This is more likely to occur when you first start taking this medication or if you take too much warfarin. To decrease your risk for bleeding, your doctor or other health care provider will monitor you closely and check your lab results (INR test) to make sure you are not taking too much warfarin. Keep all medical and laboratory appointments. Tell your doctor right away if you notice any signs of serious bleeding. See also Side Effects section.      USES:  This medication is used to treat blood clots (such as in deep vein thrombosis-DVT or pulmonary embolus-PE) and/or to prevent new clots from forming in your body. Preventing harmful blood clots helps to reduce the risk of a stroke or heart attack. Conditions that increase your risk of developing blood clots include a certain type of irregular heart rhythm (atrial fibrillation), heart  "valve replacement, recent heart attack, and certain surgeries (such as hip/knee replacement). Warfarin is commonly called a \"blood thinner,\" but the more correct term is \"anticoagulant.\" It helps to keep blood flowing smoothly in your body by decreasing the amount of certain substances (clotting proteins) in your blood.      HOW TO USE:  Read the Medication Guide provided by your pharmacist before you start taking warfarin and each time you get a refill. If you have any questions, ask your doctor or pharmacist. Take this medication by mouth with or without food as directed by your doctor or other health care professional, usually once a day. It is very important to take it exactly as directed. Do not increase the dose, take it more frequently, or stop using it unless directed by your doctor. Dosage is based on your medical condition, laboratory tests (such as INR), and response to treatment. Your doctor or other health care provider will monitor you closely while you are taking this medication to determine the right dose for you. Use this medication regularly to get the most benefit from it. To help you remember, take it at the same time each day. It is important to eat a balanced, consistent diet while taking warfarin. Some foods can affect how warfarin works in your body and may affect your treatment and dose. Avoid sudden large increases or decreases in your intake of foods high in vitamin K (such as broccoli, cauliflower, cabbage, brussels sprouts, kale, spinach, and other green leafy vegetables, liver, green tea, certain vitamin supplements). If you are trying to lose weight, check with your doctor before you try to go on a diet. Cranberry products may also affect how your warfarin works. Limit the amount of cranberry juice (16 ounces/480 milliliters a day) or other cranberry products you may drink or eat.      SIDE EFFECTS:  Nausea, loss of appetite, or stomach/abdominal pain may occur. If any of these effects " persist or worsen, tell your doctor or pharmacist promptly. Remember that your doctor has prescribed this medication because he or she has judged that the benefit to you is greater than the risk of side effects. Many people using this medication do not have serious side effects. This medication can cause serious bleeding if it affects your blood clotting proteins too much (shown by unusually high INR lab results). Even if your doctor stops your medication, this risk of bleeding can continue for up to a week. Tell your doctor right away if you have any signs of serious bleeding, including: unusual pain/swelling/discomfort, unusual/easy bruising, prolonged bleeding from cuts or gums, persistent/frequent nosebleeds, unusually heavy/prolonged menstrual flow, pink/dark urine, coughing up blood, vomit that is bloody or looks like coffee grounds, severe headache, dizziness/fainting, unusual or persistent tiredness/weakness, bloody/black/tarry stools, chest pain, shortness of breath, difficulty swallowing. Tell your doctor right away if any of these unlikely but serious side effects occur: persistent nausea/vomiting, severe stomach/abdominal pain, yellowing eyes/skin. This drug rarely has caused very serious (possibly fatal) problems if its effects lead to small blood clots (usually at the beginning of treatment). This can lead to severe skin/tissue damage that may require surgery or amputation if left untreated. Patients with certain blood conditions (protein C or S deficiency) may be at greater risk. Get medical help right away if any of these rare but serious side effects occur: painful/red/purplish patches on the skin (such as on the toe, breast, abdomen), change in the amount of urine, vision changes, confusion, slurred speech, weakness on one side of the body. A very serious allergic reaction to this drug is rare. However, get medical help right away if you notice any symptoms of a serious allergic reaction,  including: rash, itching/swelling (especially of the face/tongue/throat), severe dizziness, trouble breathing. This is not a complete list of possible side effects. If you notice other effects not listed above, contact your doctor or pharmacist. In the [place] - Call your doctor for medical advice about side effects. You may report side effects to FDA at 9-062-RUD-9576. In [place] - Call your doctor for medical advice about side effects. You may report side effects to [facility] [place] at 1-233.926.6030.      PRECAUTIONS:  Before taking warfarin, tell your doctor or pharmacist if you are allergic to it; or if you have any other allergies. This product may contain inactive ingredients, which can cause allergic reactions or other problems. Talk to your pharmacist for more details. Before using this medication, tell your doctor or pharmacist your medical history, especially of: blood disorders (such as anemia, hemophilia), bleeding problems (such as bleeding of the stomach/intestines, bleeding in the brain), blood vessel disorders (such as aneurysms), recent major injury/surgery, liver disease, alcohol use, mental/mood disorders (including memory problems), frequent falls/injuries. It is important that all your doctors and dentists know that you take warfarin. Before having surgery or any medical/dental procedures, tell your doctor or dentist that you are taking this medication and about all the products you use (including prescription drugs, nonprescription drugs, and herbal products). Avoid getting injections into the muscles. If you must have an injection into a muscle (for example, a flu shot), it should be given in the arm. This way, it will be easier to check for bleeding and/or apply pressure bandages. This medication may cause stomach bleeding. Daily use of alcohol while using this medicine will increase your risk for stomach bleeding and may also affect how this medication works. Limit or avoid alcoholic beverages. If  you have not been eating well, if you have an illness or infection that causes fever, vomiting, or diarrhea for more than 2 days, or if you start using any antibiotic medications, contact your doctor or pharmacist immediately because these conditions can affect how warfarin works. This medication can cause heavy bleeding. To lower the chance of getting cut, bruised, or injured, use great caution with sharp objects like safety razors and nail cutters. Use an electric razor when shaving and a soft toothbrush when brushing your teeth. Avoid activities such as contact sports. If you fall or injure yourself, especially if you hit your head, call your doctor immediately. Your doctor may need to check you. The Food & Drug Administration has stated that generic warfarin products are interchangeable. However, consult your doctor or pharmacist before switching warfarin products. Be careful not to take more than one medication that contains warfarin unless specifically directed by the doctor or health care provider who is monitoring your warfarin treatment. Older adults may be at greater risk for bleeding while using this drug. This medication is not recommended for use during pregnancy because of serious (possibly fatal) harm to an unborn baby. Discuss the use of reliable forms of birth control with your doctor. If you become pregnant or think you may be pregnant, tell your doctor immediately. If you are planning pregnancy, discuss a plan for managing your condition with your doctor before you become pregnant. Your doctor may switch the type of medication you use during pregnancy. Very small amounts of this medication may pass into breast milk but is unlikely to harm a nursing infant. Consult your doctor before breast-feeding.      DRUG INTERACTIONS:  Drug interactions may change how your medications work or increase your risk for serious side effects. This document does not contain all possible drug interactions. Keep a list  "of all the products you use (including prescription/nonprescription drugs and herbal products) and share it with your doctor and pharmacist. Do not start, stop, or change the dosage of any medicines without your doctor's approval. Warfarin interacts with many prescription, nonprescription, vitamin, and herbal products. This includes medications that are applied to the skin or inside the vagina or rectum. The interactions with warfarin usually result in an increase or decrease in the \"blood-thinning\" (anticoagulant) effect. Your doctor or other health care professional should closely monitor you to prevent serious bleeding or clotting problems. While taking warfarin, it is very important to tell your doctor or pharmacist of any changes in medications, vitamins, or herbal products that you are taking. Some products that may interact with this drug include: capecitabine, imatinib, mifepristone. Aspirin, aspirin-like drugs (salicylates), and nonsteroidal anti-inflammatory drugs (NSAIDs such as ibuprofen, naproxen, celecoxib) may have effects similar to warfarin. These drugs may increase the risk of bleeding problems if taken during treatment with warfarin. Carefully check all prescription/nonprescription product labels (including drugs applied to the skin such as pain-relieving creams) since the products may contain NSAIDs or salicylates. Talk to your doctor about using a different medication (such as acetaminophen) to treat pain/fever. Low-dose aspirin and related drugs (such as clopidogrel, ticlopidine) should be continued if prescribed by your doctor for specific medical reasons such as heart attack or stroke prevention. Consult your doctor or pharmacist for more details. Many herbal products interact with warfarin. Tell your doctor before taking any herbal products, especially bromelains, coenzyme Q10, cranberry, danshen, dong quai, fenugreek, garlic, ginkgo biloba, ginseng, and Zahra's wort, among others. This " medication may interfere with a certain laboratory test to measure theophylline levels, possibly causing false test results. Make sure laboratory personnel and all your doctors know you use this drug.      OVERDOSE:  If overdose is suspected, contact a poison control center or emergency room immediately. [place] residents can call the  National Poison Hotline at 1-241.901.7131. [place] residents can call a provincial poison control center. Symptoms of overdose may include: bloody/black/tarry stools, pink/dark urine, unusual/prolonged bleeding.      NOTES:  Do not share this medication with others. Laboratory and/or medical tests (such as INR, complete blood count) must be performed periodically to monitor your progress or check for side effects. Consult your doctor for more details.      MISSED DOSE:  For the best possible benefit, do not miss any doses. If you do miss a dose and remember on the same day, take it as soon as you remember. If you remember on the next day, skip the missed dose and resume your usual dosing schedule. Do not double the dose to catch up because this could increase your risk for bleeding. Keep a record of missed doses to give to your doctor or pharmacist. Contact your doctor or pharmacist if you miss 2 or more doses in a row.      STORAGE:  Store at room temperature away from light and moisture. Do not store in the bathroom. Keep all medications away from children and pets. Do not flush medications down the toilet or pour them into a drain unless instructed to do so. Properly discard this product when it is  or no longer needed. Consult your pharmacist or local waste disposal company for more details about how to safely discard your product.      MEDICAL ALERT:  Your condition and medication can cause complications in a medical emergency. For information about enrolling in MedicAlert, call 1-321.134.4598 ([place]) or 1-949.636.3751 ([place]).      Information last revised 2010  Copyright(c) 2010 [employer]           Atrial Fibrillation    Atrial fibrillation is a type of heartbeat that is irregular or fast (rapid). If you have this condition, your heart beats without any order. This makes it hard for your heart to pump blood in a normal way. Having this condition gives you more risk for stroke, heart failure, and other heart problems.  Atrial fibrillation may start all of a sudden and then stop on its own, or it may become a long-lasting problem.  What are the causes?  This condition may be caused by heart conditions, such as:  · High blood pressure.  · Heart failure.  · Heart valve disease.  · Heart surgery.  Other causes include:  · Pneumonia.  · Obstructive sleep apnea.  · Lung cancer.  · Thyroid disease.  · Drinking too much alcohol.  Sometimes the cause is not known.  What increases the risk?  You are more likely to develop this condition if:  · You smoke.  · You are older.  · You have diabetes.  · You are overweight.  · You have a family history of this condition.  · You exercise often and hard.  What are the signs or symptoms?  Common symptoms of this condition include:  · A feeling like your heart is beating very fast.  · Chest pain.  · Feeling short of breath.  · Feeling light-headed or weak.  · Getting tired easily.  Follow these instructions at home:  Medicines  · Take over-the-counter and prescription medicines only as told by your doctor.  · If your doctor gives you a blood-thinning medicine, take it exactly as told. Taking too much of it can cause bleeding. Taking too little of it does not protect you against clots. Clots can cause a stroke.  Lifestyle         · Do not use any tobacco products. These include cigarettes, chewing tobacco, and e-cigarettes. If you need help quitting, ask your doctor.  · Do not drink alcohol.  · Do not drink beverages that have caffeine. These include coffee, soda, and tea.  · Follow diet instructions as told by your doctor.  · Exercise  "regularly as told by your doctor.  General instructions  · If you have a condition that causes breathing to stop for a short period of time (apnea), treat it as told by your doctor.  · Keep a healthy weight. Do not use diet pills unless your doctor says they are safe for you. Diet pills may make heart problems worse.  · Keep all follow-up visits as told by your doctor. This is important.  Contact a doctor if:  · You notice a change in the speed, rhythm, or strength of your heartbeat.  · You are taking a blood-thinning medicine and you see more bruising.  · You get tired more easily when you move or exercise.  · You have a sudden change in weight.  Get help right away if:    · You have pain in your chest or your belly (abdomen).  · You have trouble breathing.  · You have blood in your vomit, poop, or pee (urine).  · You have any signs of a stroke. \"BE FAST\" is an easy way to remember the main warning signs:  ? B - Balance. Signs are dizziness, sudden trouble walking, or loss of balance.  ? E - Eyes. Signs are trouble seeing or a change in how you see.  ? F - Face. Signs are sudden weakness or loss of feeling in the face, or the face or eyelid drooping on one side.  ? A - Arms. Signs are weakness or loss of feeling in an arm. This happens suddenly and usually on one side of the body.  ? S - Speech. Signs are sudden trouble speaking, slurred speech, or trouble understanding what people say.  ? T - Time. Time to call emergency services. Write down what time symptoms started.  · You have other signs of a stroke, such as:  ? A sudden, very bad headache with no known cause.  ? Feeling sick to your stomach (nausea).  ? Throwing up (vomiting).  ? Jerky movements you cannot control (seizure).  These symptoms may be an emergency. Do not wait to see if the symptoms will go away. Get medical help right away. Call your local emergency services (911 in the [place] Do not drive yourself to the hospital.  Summary  · Atrial " fibrillation is a type of heartbeat that is irregular or fast (rapid).  · You are at higher risk of this condition if you smoke, are older, have diabetes, or are overweight.  · Follow your doctor's instructions about medicines, diet, exercise, and follow-up visits.  · Get help right away if you think that you have signs of a stroke.  This information is not intended to replace advice given to you by your health care provider. Make sure you discuss any questions you have with your health care provider.  Document Released: 09/26/2009 Document Revised: 02/21/2019 Document Reviewed: 02/08/2019  [employer] Patient Education © 2020 [employer]      Acute Respiratory Failure, Adult    Acute respiratory failure occurs when there is not enough oxygen passing from your lungs to your body. When this happens, your lungs have trouble removing carbon dioxide from the blood. This causes your blood oxygen level to drop too low as carbon dioxide builds up.  Acute respiratory failure is a medical emergency. It can develop quickly, but it is temporary if treated promptly. Your lung capacity, or how much air your lungs can hold, may improve with time, exercise, and treatment.  What are the causes?  There are many possible causes of acute respiratory failure, including:  · Lung injury.  · Chest injury or damage to the ribs or tissues near the lungs.  · Lung conditions that affect the flow of air and blood into and out of the lungs, such as pneumonia, acute respiratory distress syndrome, and cystic fibrosis.  · Medical conditions, such as strokes or spinal cord injuries, that affect the muscles and nerves that control breathing.  · Blood infection (sepsis).  · Inflammation of the pancreas (pancreatitis).  · A blood clot in the lungs (pulmonary embolism).  · A large-volume blood transfusion.  · Burns.  · Near-drowning.  · Seizure.  · Smoke inhalation.  · Reaction to medicines.  · Alcohol or drug overdose.  What increases the risk?  This  condition is more likely to develop in people who have:  · A blocked airway.  · Asthma.  · A condition or disease that damages or weakens the muscles, nerves, bones, or tissues that are involved in breathing.  · A serious infection.  · A health problem that blocks the unconscious reflex that is involved in breathing, such as hypothyroidism or sleep apnea.  · A lung injury or trauma.  What are the signs or symptoms?  Trouble breathing is the main symptom of acute respiratory failure. Symptoms may also include:  · Rapid breathing.  · Restlessness or anxiety.  · Skin, lips, or fingernails that appear blue (cyanosis).  · Rapid heart rate.  · Abnormal heart rhythms (arrhythmias).  · Confusion or changes in behavior.  · Tiredness or loss of energy.  · Feeling sleepy or having a loss of consciousness.  How is this diagnosed?  Your health care provider can diagnose acute respiratory failure with a medical history and physical exam. During the exam, your health care provider will listen to your heart and check for crackling or wheezing sounds in your lungs. Your may also have tests to confirm the diagnosis and determine what is causing respiratory failure. These tests may include:  · Measuring the amount of oxygen in your blood (pulse oximetry). The measurement comes from a small device that is placed on your finger, earlobe, or toe.  · Other blood tests to measure blood gases and to look for signs of infection.  · Sampling your cerebral spinal fluid or tracheal fluid to check for infections.  · Chest X-ray to look for fluid in spaces that should be filled with air.  · Electrocardiogram (ECG) to look at the heart's electrical activity.  How is this treated?  Treatment for this condition usually takes places in a hospital intensive care unit (ICU). Treatment depends on what is causing the condition. It may include one or more treatments until your symptoms improve. Treatment may include:  · Supplemental oxygen. Extra oxygen  is given through a tube in the nose, a face mask, or a bain.  · A device such as a continuous positive airway pressure (CPAP) or bi-level positive airway pressure (BiPAP or BPAP) machine. This treatment uses mild air pressure to keep the airways open. A mask or other device will be placed over your nose or mouth. A tube that is connected to a motor will deliver oxygen through the mask.  · Ventilator. This treatment helps move air into and out of the lungs. This may be done with a bag and mask or a machine. For this treatment, a tube is placed in your windpipe (trachea) so air and oxygen can flow to the lungs.  · Extracorporeal membrane oxygenation (ECMO). This treatment temporarily takes over the function of the heart and lungs, supplying oxygen and removing carbon dioxide. ECMO gives the lungs a chance to recover. It may be used if a ventilator is not effective.  · Tracheostomy. This is a procedure that creates a hole in the neck to insert a breathing tube.  · Receiving fluids and medicines.  · Rocking the bed to help breathing.  Follow these instructions at home:  · Take over-the-counter and prescription medicines only as told by your health care provider.  · Return to normal activities as told by your health care provider. Ask your health care provider what activities are safe for you.  · Keep all follow-up visits as told by your health care provider. This is important.  How is this prevented?  Treating infections and medical conditions that may lead to acute respiratory failure can help prevent the condition from developing.  Contact a health care provider if:  · You have a fever.  · Your symptoms do not improve or they get worse.  Get help right away if:  · You are having trouble breathing.  · You lose consciousness.  · Your have cyanosis or turn blue.  · You develop a rapid heart rate.  · You are confused.  These symptoms may represent a serious problem that is an emergency. Do not wait to see if the symptoms  will go away. Get medical help right away. Call your local emergency services (911 in the [place] Do not drive yourself to the hospital.  This information is not intended to replace advice given to you by your health care provider. Make sure you discuss any questions you have with your health care provider.  Document Released: 12/23/2014 Document Revised: 11/30/2018 Document Reviewed: 07/05/2017  [employer] Patient Education © 2020 [employer]      Fluid Restriction  With some health conditions, you must restrict your fluid intake. This means that you need to limit the amount of fluid that you drink each day (fluid restriction). When you have a fluid restriction, you must carefully measure and keep track of the amount of fluid that you drink. Your health care provider will identify the specific amount of fluid you are allowed each day (fluid allowance). This amount may depend on several things, such as:  · How well your kidneys function.  · How much fluid you are keeping (retaining) in your body tissues.  · Your blood pressure.  · Your heart function.  · Your blood sodium level.  What is my plan?  Your health care provider recommends that you limit your fluid intake to 1200ml per day.  What counts toward my fluid intake?  Your fluid intake includes all liquids that you drink, as well as any foods that become liquid at room temperature.  The following are examples of some fluids that you will have to restrict:  · Tea, coffee, soda, lemonade, milk, water, juice, sports drinks, and nutritional supplement beverages.  · Alcoholic beverages.  · Cream.  · Gravy.  · Ice cubes.  · Soup and broth.  The following are examples of foods that become liquid at room temperature. These foods will also count toward your fluid intake.  · Ice cream and ice milk.  · Frozen yogurt and sherbet.  · Frozen ice pops.  · Flavored gelatin.  How do I keep track of my fluid intake?  Each morning, fill a jug with the amount of water that is equal  to your daily fluid allowance. You can use this water as a guideline for fluid allowance. Each time you take in any form of fluid (including ice cubes and foods that become liquid at room temperature), pour an equal amount of water out of the container. This helps you to see how much fluid you are taking in. It also helps you to see how much more fluid you can take in during the rest of the day.  The following conversions may also be helpful in measuring your fluid intake:  · 1 cup equals 8 oz (240 mL).  · ¾ cup equals 6 oz (180 mL).  · ? cup equals 5? oz (160 mL).  · ½ cup equals 4 oz (120 mL).  · ? cup equals 2? oz (80 mL).  · ¼ cup equals 2 oz (60 mL).  · 2 Tbsp equals 1 oz (30 mL).  What are tips for following this plan?  General instructions  · Make sure that you stay within your recommended fluid allowance each day. Always measure and keep track of your fluids (including ice cubes and foods that become liquid at room temperature).  · Use small cups and glasses and learn to sip fluids slowly.  · Try frozen fruits between meals, such as grapes or strawberries. These can satisfy thirst without adding to your fluid intake.  · Swallow your pills along with meals or soft foods such as applesauce or mashed potatoes, instead of with liquids. Doing this helps you to save your fluid allowance for something that you enjoy.  Weigh yourself each day         Weigh yourself every day. Keeping track of your daily weight can help you and your health care provider to notice as soon as possible if you are retaining too much fluid in your body.  · Follow this sequence every mornin. Urinate.  2. Weigh yourself.  3. Eat breakfast.  · Wear the same amount of clothing each time you weigh yourself.  · Write down your daily weight. Give this weight record to your health care provider. If your weight is going up, you may be retaining too much fluid. Every 1 lb (0.45 kg) of body weight that you gain is a sign that your body is  retaining 2 cups (480 mL) of fluid.    Manage your thirst  · Add lemon juice or a slice of fresh lemon to water or ice. Doing this helps to satisfy your thirst.  · Freeze fruit juice or water in an ice cube tray. Use this as part of your fluid allowance. These cubes are useful for quenching your thirst. Before you freeze the juice or water, measure how much liquid you use to fill a cube section of the ice tray. Subtract this amount from your day's allowance each time you consume a frozen cube.  · Avoid salty (high-sodium) foods. These foods make you thirsty and make it more difficult to stay within your daily fluid allowance.  · Keep the temperature in your home at a cooler level.  · Keep the air in your home as humid as possible. Dry air increases thirst.  · Avoid being out in the hot sun, which can cause you to sweat and become thirsty.  · To help avoid dry mouth, brush your teeth often or rinse out your mouth with mouthwash. Lemon wedges, hard sour candies, chewing gum, or breath spray may also help to moisten your mouth.  What are some signs that I may be taking in too much fluid?  You may be taking in too much fluid if:  · Your weight increases. Contact your health care provider if you gain weight rapidly.  · Your face, hands, legs, feet, and abdomen start to swell.  · You have trouble breathing.  Summary  · With some health conditions, you must limit (restrict) your fluid intake. This means that you need to limit the amount of fluid you drink each day (fluid restriction). Your health care provider will identify the specific amount of fluid that you are allowed each day.  · When you have a fluid restriction, you must carefully measure and keep track of the amount of fluid that you drink.  · Your fluid intake includes all liquids that you drink, as well as any foods that become liquid at room temperature (such as ice cream and gelatin).  · You may be taking in too much fluid if your weight increases, your body  starts to swell, or you have trouble breathing.  This information is not intended to replace advice given to you by your health care provider. Make sure you discuss any questions you have with your health care provider.  Document Released: 10/14/2008 Document Revised: 04/09/2020 Document Reviewed: 08/22/2018  Elsevier Patient Education © 2020 [employer]    Depression / Suicide Risk    As you are discharged from this [facility] facility, it is important to learn how to keep safe from harming yourself.    Recognize the warning signs:  · Abrupt changes in personality, positive or negative- including increase in energy   · Giving away possessions  · Change in eating patterns- significant weight changes-  positive or negative  · Change in sleeping patterns- unable to sleep or sleeping all the time   · Unwillingness or inability to communicate  · Depression  · Unusual sadness, discouragement and loneliness  · Talk of wanting to die  · Neglect of personal appearance   · Rebelliousness- reckless behavior  · Withdrawal from people/activities they love  · Confusion- inability to concentrate     If you or a loved one observes any of these behaviors or has concerns about self-harm, here's what you can do:  · Talk about it- your feelings and reasons for harming yourself  · Remove any means that you might use to hurt yourself (examples: pills, rope, extension cords, firearm)  · Get professional help from the community (Mental Health, Substance Abuse, psychological counseling)  · Do not be alone:Call your Safe Contact- someone whom you trust who will be there for you.  · Call your local CRISIS HOTLINE 933-3749 or 058-399-5685  · Call your local Children's Mobile Crisis Response Team [place] (531) 106-9274 or www.[employer]  · Call the toll free National Suicide Prevention Hotlines   · National Suicide Prevention Lifeline 787-894-IGYJ (5824)  · National Hope Line Network 800-SUICIDE (356-9780)

## 2021-03-23 NOTE — PROGRESS NOTES
Received report from day shift RN.  Assumed care at 1900. Pt denies any discomfort at this time.  Call light within reach. Bed locked and in lowest position.  Non skid socks on, Belongings within  Reach.  Will continue to monitor hourly.

## 2021-03-24 ENCOUNTER — ANTICOAGULATION VISIT (OUTPATIENT)
Dept: VASCULAR LAB | Facility: MEDICAL CENTER | Age: 46
End: 2021-03-24
Attending: INTERNAL MEDICINE
Payer: MEDICAID

## 2021-03-24 ENCOUNTER — TELEPHONE (OUTPATIENT)
Dept: VASCULAR LAB | Facility: MEDICAL CENTER | Age: 46
End: 2021-03-24

## 2021-03-24 DIAGNOSIS — I48.91 ATRIAL FIBRILLATION, UNSPECIFIED TYPE (HCC): ICD-10-CM

## 2021-03-24 LAB — INR PPP: 2 (ref 2–3.5)

## 2021-03-24 PROCEDURE — 99212 OFFICE O/P EST SF 10 MIN: CPT

## 2021-03-24 PROCEDURE — 85610 PROTHROMBIN TIME: CPT

## 2021-03-24 ASSESSMENT — CHA2DS2 SCORE
VASCULAR DISEASE: NO
PRIOR STROKE OR TIA OR THROMBOEMBOLISM: NO
CHF OR LEFT VENTRICULAR DYSFUNCTION: NO
DIABETES: NO
SEX: FEMALE
AGE 65 TO 74: NO
HYPERTENSION: YES
CHA2DS2 VASC SCORE: 2
AGE 75 OR GREATER: NO

## 2021-03-24 NOTE — PROGRESS NOTES
OP Anticoagulation Service Note    Date: 3/24/2021  There were no vitals filed for this visit.    Anticoagulation Summary  As of 3/24/2021    INR goal:  2.0-3.0   TTR:  --   INR used for dosin.00 (3/24/2021)   Warfarin maintenance plan:  6 mg (6 mg x 1) every day   Weekly warfarin total:  42 mg   Plan last modified:  Carlos Barros PharmD (3/24/2021)   Next INR check:  3/26/2021   Target end date:  Indefinite    Indications    A-fib (HCC) [I48.91]             Anticoagulation Episode Summary     INR check location:      Preferred lab:      Send INR reminders to:      Comments:          Anticoagulation Patient Findings      HPI:   Lorene Haro is new to our services, on anticoagulation therapy with warfarin (a high risk medication) for new onset Atrial Fibrillation. Pt with significant MH hx presented to clinic today with a care giver/assistant.     CHADS-VASC = 2 (HTN, Gender)    Provided pt education on warfarin. Including how to take, what to do if missed dose, importance of INR monitoring, drug and food interactions. Patient is aware to avoid NSAIDs and ASA (unless directed by provider). Educated pt on s/s of bleeding and thrombosis. Patient is aware to seek medical attention for severe falls or injury to their heads, to report all medication changes to our office and to notify our office of unusual bleeding or bruising.   Medication reviewed and updated  Is pt on triple therapy? No   Hx of bleeding ? No significant history of bleeds  Pt counseled to avoid pregnancy  Pt counseled to avoid estrogen  Pt counseled to quit smoking       A/P   INR  therapeutic at 2.0 after starting 2 days ago  Pt has been taking warfarin in the morning to ensure compliance  Instructed pt to reduce dose tomorrow to 3mg then repeat INR in 2 days prior to instructed dose.        Follow up appointment in 2 day(s) as pt is new to warfarin and will require close follow up.       Carlos Barros, HildaD, [facility]

## 2021-03-24 NOTE — PROGRESS NOTES
[facility] Medicine Daily Progress Note    Date of Service  3/23/2021    Chief Complaint  45 y.o. female admitted 3/18/2021 with   Chief Complaint   Patient presents with   • Shortness of Breath     PT has HX of COPD and asthma. PT has had increased SOB x3days. PT used rescue inhaler at home with no releif. PT presented with SPO2 of 89% RA and expritory wheezing.          Hospital Course  45 y.o. female with history of asthma and COPD, who presented 3/18/2021 with complaints of worsening shortness of breath x5 days.  She additionally reports that for the past few days she has had a cough with white sputum, has become more short of breath when lying flat, and she has noticed that her bilateral lower extremities have become swollen over the past 1-1/2 months.  Additionally reported chills, but denies any fevers or chest pain at this time.  She states that she has been using her rescue inhaler every few hours with temporary mild improvement.  Of note, patient was seen here in the ED on 3/4 for similar complaints where she was diagnosed with COPD exacerbation.  She was given albuterol, steroids, and discharged on 5-day course of prednisone.     She was noted wheezing on PE. New onset Afib noted on admission, on lovenox therapeutic dosage, will switch to coumadin given less evidence of NOAC as her body weigth >120kg  Procal neg  proBNP 489  CXR personally reviewed and interpreted: mild pulmonary edema or infiltrate  EKG personally reviewed and interpreted: Afib with rate 80s    Interval Problem Update  Patient was seen and examined at the bedside. No overnight events reported.  All Data, Medication data reviewed.  Case discussed with nursing, NEREIDA in IDT rounds.   Alert, awake, denies fever, chills, chest pain.  Wheezing improivng, no sign respiratory distress, on 2 L oxygen.  O2 evaluation    Consultants/Specialty  none    Code Status  Prior    Disposition  Likely [facility]    Review of Systems  Review of Systems    Constitutional: Positive for malaise/fatigue. Negative for chills and fever.   HENT: Negative for ear discharge.    Eyes: Negative for blurred vision.   Respiratory: Negative for cough and hemoptysis.    Cardiovascular: Negative for chest pain, palpitations and leg swelling.   Gastrointestinal: Positive for nausea. Negative for vomiting.   Genitourinary: Negative for dysuria.   Musculoskeletal: Negative for myalgias.   Skin: Negative for rash.   Neurological: Negative for focal weakness.   Endo/Heme/Allergies: Does not bruise/bleed easily.   Psychiatric/Behavioral: Negative for depression.        Physical Exam  Temp:  [36 °C (96.8 °F)-36.5 °C (97.7 °F)] 36.1 °C (97 °F)  Pulse:  [52-78] 55  Resp:  [18-20] 20  BP: ()/(58-69) 120/58  SpO2:  [93 %-98 %] 94 %    Physical Exam  Vitals reviewed.   Constitutional:       Appearance: Normal appearance. She is obese. She is ill-appearing.   HENT:      Head: Normocephalic and atraumatic.      Nose: Nose normal.      Mouth/Throat:      Mouth: Mucous membranes are dry.      Pharynx: Oropharynx is clear.   Eyes:      Extraocular Movements: Extraocular movements intact.      Conjunctiva/sclera: Conjunctivae normal.      Pupils: Pupils are equal, round, and reactive to light.   Cardiovascular:      Rate and Rhythm: Normal rate and regular rhythm.      Pulses: Normal pulses.      Heart sounds: Normal heart sounds.   Pulmonary:      Effort: Pulmonary effort is normal. No respiratory distress.      Breath sounds: No wheezing.      Comments: Diminished breath sounds bilateral  Abdominal:      General: Abdomen is flat. Bowel sounds are normal.      Palpations: Abdomen is soft.      Tenderness: There is no abdominal tenderness.   Musculoskeletal:         General: Normal range of motion.      Cervical back: Normal range of motion and neck supple.   Skin:     General: Skin is warm and dry.   Neurological:      General: No focal deficit present.      Mental Status: She is alert and  oriented to person, place, and time. Mental status is at baseline.   Psychiatric:         Mood and Affect: Mood normal.         Behavior: Behavior normal.         Fluids    Intake/Output Summary (Last 24 hours) at 3/23/2021 1718  Last data filed at 3/23/2021 1015  Gross per 24 hour   Intake 1120 ml   Output --   Net 1120 ml       Laboratory  Recent Labs     03/21/21  0334   WBC 11.4*   RBC 3.95*   HEMOGLOBIN 10.9*   HEMATOCRIT 37.1   MCV 93.9   MCH 27.6   MCHC 29.4*   RDW 53.3*   PLATELETCT 225   MPV 11.4     Recent Labs     03/21/21  0334   SODIUM 136   POTASSIUM 4.4   CHLORIDE 99   CO2 32   GLUCOSE 135*   BUN 29*   CREATININE 0.94   CALCIUM 9.3     Recent Labs     03/21/21  1225 03/22/21  0425 03/23/21  0151   INR 1.08 1.04 1.08               Imaging  EC-ECHOCARDIOGRAM COMPLETE W/ CONT   Final Result      DX-CHEST-PORTABLE (1 VIEW)   Final Result         1.  Mild pulmonary edema and/or infiltrates.   2.  Cardiomegaly           Assessment/Plan  * Acute respiratory failure (HCC)- (present on admission)  Assessment & Plan  Mild hypoxia on arrival, improved to 95% on 2L NC  Noted to have diffuse wheezing on physical exam  , Procal neg  CXR personally reviewed and interpreted: mild pulmonary edema or infiltrate    - c/w Solumedrol 40mg BID, on prednisone taper  - Duonebs and O2 therapy as needed, montelukast, Spiriva  - Incentive spirometry  - Azithro 500mg PO qd for COPD exacerbation  - Lasix as needed  - c/w home COPD medications  -RT protocol, wean oxygen as tolerated, I&O    A-fib (HCC)- (present on admission)  Assessment & Plan  New onset Afib on ECG  EKG personally reviewed and interpreted: Afib with rate 80s  Chadsvasc 2  Noted to have COPD, KATHIA, and Asthma  - TTE: Normal left ventricular systolic function, EF 60%. The right ventricle was normal in size and function.   - full dose lovenox, will switch to coumadin, not NOAC given her BMI 54 and weight is 130kg. Pharmacy to dose coumadin   - TSH normal  -  currently rate controlled    Acute exacerbation of chronic obstructive pulmonary disease (COPD) ([facility])- (present on admission)  Assessment & Plan  Plan as above        Blood glucose elevated  Assessment & Plan  A1c 5.2 in 1/2021  Sec to steroid use  On SSI  Accucheck with hypoglycemic protocol    Elevated brain natriuretic peptide (BNP) level- (present on admission)  Assessment & Plan    - TTE  - Conservative IVF use  - Daily weights, I/O  - Fluid restrict 1200 CC  - Low Na diet  - Lasix as needed      Class 3 severe obesity due to excess calories with body mass index (BMI) of 50.0 to 59.9 in adult ([facility])- (present on admission)  Assessment & Plan  I have counseled the patient on healthy diet and exercise to lose weight.       Hx of  Bipolar affective disorder, and schizophrenia- (present on admission)  Assessment & Plan  C/w Home meds  [place] level normal       VTE prophylaxis: coumadin

## 2021-03-24 NOTE — Clinical Note
Pt seen with new onset Afib noted upon admission 3/18. She has assistants come to clinic d/t [facility] and both understood the role and expectations of clinic.    Pt started warfarin at 9mg on 3/22 followed by 6mg on 3/23. Pt took 6mg of warfarin this morning and had significant INR increase from 1.08 yesterday to 2.00 today. Reducing tomorrow's dose and repeat INR in 2 days.    Thank you,

## 2021-03-25 NOTE — TELEPHONE ENCOUNTER
Initial anticoag note and most recent d/c summary reviewed.  Patient with afib diagnosed at recent hospitalization    Pending further recommendations, we will continue with indefinite anticoagulation with warfarin  as directed at d/c    Will defer all management of rhythm, rate, and other cv issues, aside from anticoagulation, to cards. Has cards f/u scheduled    Michael Bloch, MD  Anticoagulation Clinic    Cc: SIMEON Dinh

## 2021-03-26 ENCOUNTER — ANTICOAGULATION VISIT (OUTPATIENT)
Dept: VASCULAR LAB | Facility: MEDICAL CENTER | Age: 46
End: 2021-03-26
Attending: INTERNAL MEDICINE
Payer: MEDICAID

## 2021-03-26 DIAGNOSIS — I48.91 ATRIAL FIBRILLATION, UNSPECIFIED TYPE (HCC): ICD-10-CM

## 2021-03-26 LAB — INR PPP: 1.4 (ref 2–3.5)

## 2021-03-26 PROCEDURE — 85610 PROTHROMBIN TIME: CPT

## 2021-03-26 PROCEDURE — 99212 OFFICE O/P EST SF 10 MIN: CPT

## 2021-03-26 NOTE — PROGRESS NOTES
Anticoagulation Summary  As of 3/26/2021    INR goal:  2.0-3.0   TTR:  --   INR used for dosin.40 (3/26/2021)   Warfarin maintenance plan:  6 mg (6 mg x 1) every day   Weekly warfarin total:  42 mg   Plan last modified:  Carlos Barros, PharmD (3/24/2021)   Next INR check:  3/29/2021   Target end date:  Indefinite    Indications    A-fib (HCC) [I48.91]             Anticoagulation Episode Summary     INR check location:      Preferred lab:      Send INR reminders to:      Comments:          Anticoagulation Patient Findings  Patient Findings     Negatives:  Signs/symptoms of thrombosis, Signs/symptoms of bleeding, Laboratory test error suspected, Change in health, Change in alcohol use, Change in activity, Upcoming invasive procedure, Emergency department visit, Upcoming dental procedure, Missed doses, Extra doses, Change in medications, Change in diet/appetite, Hospital admission, Bruising, Other complaints              History of Present Illness: follow up appointment for chronic anticoagulation with the high risk medication, warfarin for atrial fibrillation      Last INR was out of range, dosage adjusted: Will bolus dose with 9mg tonight, then resume 6mg po daily  Follow up in 3 days, to reduce the risk of adverse events related to this high risk medication, warfarin.    Ceci Coe, Clinical Pharmacist      Ceci Coe, Clinical Pharmacist, CDE, CACP

## 2021-03-29 ENCOUNTER — ANTICOAGULATION VISIT (OUTPATIENT)
Dept: VASCULAR LAB | Facility: MEDICAL CENTER | Age: 46
End: 2021-03-29
Attending: INTERNAL MEDICINE
Payer: MEDICAID

## 2021-03-29 ENCOUNTER — PATIENT OUTREACH (OUTPATIENT)
Dept: HEALTH INFORMATION MANAGEMENT | Facility: OTHER | Age: 46
End: 2021-03-29

## 2021-03-29 DIAGNOSIS — I48.91 ATRIAL FIBRILLATION, UNSPECIFIED TYPE (HCC): ICD-10-CM

## 2021-03-29 LAB
INR BLD: 1.4 (ref 0.9–1.2)
INR PPP: 2.4 (ref 2–3.5)

## 2021-03-29 PROCEDURE — 99211 OFF/OP EST MAY X REQ PHY/QHP: CPT | Performed by: NURSE PRACTITIONER

## 2021-03-29 PROCEDURE — 85610 PROTHROMBIN TIME: CPT

## 2021-03-29 NOTE — PROGRESS NOTES
Anticoagulation Summary  As of 3/29/2021    INR goal:  2.0-3.0   TTR:  --   INR used for dosin.40 (3/29/2021)   Warfarin maintenance plan:  6 mg (6 mg x 1) every day   Weekly warfarin total:  42 mg   Plan last modified:  Carlos Barros, PharmD (3/24/2021)   Next INR check:  2021   Target end date:  Indefinite    Indications    A-fib (HCC) [I48.91]             Anticoagulation Episode Summary     INR check location:      Preferred lab:      Send INR reminders to:      Comments:          Anticoagulation Patient Findings      HPI:  Lorene Haro seen in clinic today for follow up on anticoagulation therapy in the presence of AF.   Patient presents with her .  Denies any changes to current medical/health status since last appointment.   Denies any medication or diet changes.   No current symptoms of bleeding or thrombosis reported.    Pt on antiplatelet therapy - none.    A/P:   INR therapeutic.   Continue current regimen.     Pt educated to contact our clinic with any changes in medications or s/s of bleeding or thrombosis. Pt is aware to seek immediate medical attention for falls, head injury or deep cuts    Follow up appointment on Friday.    Next Appointment: 2021 at 10:45 am.    Sharri SHRESTHA

## 2021-03-31 ENCOUNTER — HOSPITAL ENCOUNTER (INPATIENT)
Facility: MEDICAL CENTER | Age: 46
LOS: 2 days | DRG: 190 | End: 2021-04-03
Attending: EMERGENCY MEDICINE | Admitting: STUDENT IN AN ORGANIZED HEALTH CARE EDUCATION/TRAINING PROGRAM
Payer: MEDICAID

## 2021-03-31 ENCOUNTER — APPOINTMENT (OUTPATIENT)
Dept: RADIOLOGY | Facility: MEDICAL CENTER | Age: 46
DRG: 190 | End: 2021-03-31
Attending: EMERGENCY MEDICINE
Payer: MEDICAID

## 2021-03-31 DIAGNOSIS — R60.0 BILATERAL LOWER EXTREMITY EDEMA: ICD-10-CM

## 2021-03-31 DIAGNOSIS — J44.1 ACUTE EXACERBATION OF CHRONIC OBSTRUCTIVE PULMONARY DISEASE (COPD) (HCC): ICD-10-CM

## 2021-03-31 LAB
ALBUMIN SERPL BCP-MCNC: 3.7 G/DL (ref 3.2–4.9)
ALBUMIN/GLOB SERPL: 1.2 G/DL
ALP SERPL-CCNC: 95 U/L (ref 30–99)
ALT SERPL-CCNC: 20 U/L (ref 2–50)
ANION GAP SERPL CALC-SCNC: 8 MMOL/L (ref 7–16)
AST SERPL-CCNC: 13 U/L (ref 12–45)
BASOPHILS # BLD AUTO: 0.1 % (ref 0–1.8)
BASOPHILS # BLD: 0.02 K/UL (ref 0–0.12)
BILIRUB SERPL-MCNC: 1 MG/DL (ref 0.1–1.5)
BUN SERPL-MCNC: 9 MG/DL (ref 8–22)
CALCIUM SERPL-MCNC: 8.9 MG/DL (ref 8.5–10.5)
CHLORIDE SERPL-SCNC: 102 MMOL/L (ref 96–112)
CO2 SERPL-SCNC: 30 MMOL/L (ref 20–33)
CREAT SERPL-MCNC: 0.82 MG/DL (ref 0.5–1.4)
EKG IMPRESSION: NORMAL
EOSINOPHIL # BLD AUTO: 0.19 K/UL (ref 0–0.51)
EOSINOPHIL NFR BLD: 1.3 % (ref 0–6.9)
ERYTHROCYTE [DISTWIDTH] IN BLOOD BY AUTOMATED COUNT: 54.6 FL (ref 35.9–50)
GLOBULIN SER CALC-MCNC: 3 G/DL (ref 1.9–3.5)
GLUCOSE SERPL-MCNC: 113 MG/DL (ref 65–99)
HCT VFR BLD AUTO: 41.9 % (ref 37–47)
HGB BLD-MCNC: 12.6 G/DL (ref 12–16)
IMM GRANULOCYTES # BLD AUTO: 0.06 K/UL (ref 0–0.11)
IMM GRANULOCYTES NFR BLD AUTO: 0.4 % (ref 0–0.9)
LACTATE BLD-SCNC: 1.4 MMOL/L (ref 0.5–2)
LYMPHOCYTES # BLD AUTO: 1.42 K/UL (ref 1–4.8)
LYMPHOCYTES NFR BLD: 9.5 % (ref 22–41)
MCH RBC QN AUTO: 27.8 PG (ref 27–33)
MCHC RBC AUTO-ENTMCNC: 30.1 G/DL (ref 33.6–35)
MCV RBC AUTO: 92.5 FL (ref 81.4–97.8)
MONOCYTES # BLD AUTO: 0.89 K/UL (ref 0–0.85)
MONOCYTES NFR BLD AUTO: 5.9 % (ref 0–13.4)
NEUTROPHILS # BLD AUTO: 12.42 K/UL (ref 2–7.15)
NEUTROPHILS NFR BLD: 82.8 % (ref 44–72)
NRBC # BLD AUTO: 0 K/UL
NRBC BLD-RTO: 0 /100 WBC
NT-PROBNP SERPL IA-MCNC: 131 PG/ML (ref 0–125)
PLATELET # BLD AUTO: 273 K/UL (ref 164–446)
PMV BLD AUTO: 11 FL (ref 9–12.9)
POTASSIUM SERPL-SCNC: 3.8 MMOL/L (ref 3.6–5.5)
PROT SERPL-MCNC: 6.7 G/DL (ref 6–8.2)
RBC # BLD AUTO: 4.53 M/UL (ref 4.2–5.4)
SODIUM SERPL-SCNC: 140 MMOL/L (ref 135–145)
TROPONIN T SERPL-MCNC: 26 NG/L (ref 6–19)
WBC # BLD AUTO: 15 K/UL (ref 4.8–10.8)

## 2021-03-31 PROCEDURE — 99285 EMERGENCY DEPT VISIT HI MDM: CPT

## 2021-03-31 PROCEDURE — 94640 AIRWAY INHALATION TREATMENT: CPT

## 2021-03-31 PROCEDURE — 85730 THROMBOPLASTIN TIME PARTIAL: CPT

## 2021-03-31 PROCEDURE — 85025 COMPLETE CBC W/AUTO DIFF WBC: CPT

## 2021-03-31 PROCEDURE — A9270 NON-COVERED ITEM OR SERVICE: HCPCS | Performed by: EMERGENCY MEDICINE

## 2021-03-31 PROCEDURE — 36415 COLL VENOUS BLD VENIPUNCTURE: CPT

## 2021-03-31 PROCEDURE — 80053 COMPREHEN METABOLIC PANEL: CPT

## 2021-03-31 PROCEDURE — 83605 ASSAY OF LACTIC ACID: CPT

## 2021-03-31 PROCEDURE — 700111 HCHG RX REV CODE 636 W/ 250 OVERRIDE (IP): Performed by: EMERGENCY MEDICINE

## 2021-03-31 PROCEDURE — 84145 PROCALCITONIN (PCT): CPT

## 2021-03-31 PROCEDURE — 84484 ASSAY OF TROPONIN QUANT: CPT

## 2021-03-31 PROCEDURE — 700102 HCHG RX REV CODE 250 W/ 637 OVERRIDE(OP): Performed by: EMERGENCY MEDICINE

## 2021-03-31 PROCEDURE — 93005 ELECTROCARDIOGRAM TRACING: CPT | Performed by: EMERGENCY MEDICINE

## 2021-03-31 PROCEDURE — 71045 X-RAY EXAM CHEST 1 VIEW: CPT

## 2021-03-31 PROCEDURE — 83880 ASSAY OF NATRIURETIC PEPTIDE: CPT

## 2021-03-31 PROCEDURE — 85610 PROTHROMBIN TIME: CPT

## 2021-03-31 PROCEDURE — 700101 HCHG RX REV CODE 250: Performed by: EMERGENCY MEDICINE

## 2021-03-31 PROCEDURE — 96374 THER/PROPH/DIAG INJ IV PUSH: CPT

## 2021-03-31 RX ORDER — ASPIRIN 81 MG/1
324 TABLET, CHEWABLE ORAL ONCE
Status: COMPLETED | OUTPATIENT
Start: 2021-03-31 | End: 2021-03-31

## 2021-03-31 RX ORDER — IPRATROPIUM BROMIDE AND ALBUTEROL SULFATE 2.5; .5 MG/3ML; MG/3ML
3 SOLUTION RESPIRATORY (INHALATION) ONCE
Status: COMPLETED | OUTPATIENT
Start: 2021-03-31 | End: 2021-03-31

## 2021-03-31 RX ORDER — METHYLPREDNISOLONE SODIUM SUCCINATE 125 MG/2ML
125 INJECTION, POWDER, LYOPHILIZED, FOR SOLUTION INTRAMUSCULAR; INTRAVENOUS ONCE
Status: COMPLETED | OUTPATIENT
Start: 2021-03-31 | End: 2021-03-31

## 2021-03-31 RX ADMIN — IPRATROPIUM BROMIDE AND ALBUTEROL SULFATE 3 ML: .5; 2.5 SOLUTION RESPIRATORY (INHALATION) at 23:34

## 2021-03-31 RX ADMIN — ASPIRIN 324 MG: 81 TABLET, CHEWABLE ORAL at 23:26

## 2021-03-31 RX ADMIN — METHYLPREDNISOLONE SODIUM SUCCINATE 125 MG: 125 INJECTION, POWDER, FOR SOLUTION INTRAMUSCULAR; INTRAVENOUS at 23:27

## 2021-04-01 PROBLEM — R60.0 BILATERAL LOWER EXTREMITY EDEMA: Status: ACTIVE | Noted: 2021-04-01

## 2021-04-01 PROBLEM — R25.1 TREMOR OF BOTH HANDS: Status: ACTIVE | Noted: 2021-04-01

## 2021-04-01 PROBLEM — B37.2 YEAST INFECTION OF THE SKIN: Status: ACTIVE | Noted: 2021-04-01

## 2021-04-01 PROBLEM — F41.9 ANXIETY: Status: ACTIVE | Noted: 2021-04-01

## 2021-04-01 PROBLEM — R25.2 BILATERAL LEG CRAMPS: Status: ACTIVE | Noted: 2021-04-01

## 2021-04-01 LAB
APPEARANCE UR: CLEAR
APTT PPP: 62 SEC (ref 24.7–36)
BILIRUB UR QL STRIP.AUTO: NEGATIVE
COLOR UR: YELLOW
EKG IMPRESSION: NORMAL
GLUCOSE UR STRIP.AUTO-MCNC: NEGATIVE MG/DL
INR PPP: 2.52 (ref 0.87–1.13)
KETONES UR STRIP.AUTO-MCNC: NEGATIVE MG/DL
LEUKOCYTE ESTERASE UR QL STRIP.AUTO: NEGATIVE
LIPASE SERPL-CCNC: 15 U/L (ref 11–82)
MAGNESIUM SERPL-MCNC: 2.2 MG/DL (ref 1.5–2.5)
MICRO URNS: NORMAL
NITRITE UR QL STRIP.AUTO: NEGATIVE
PH UR STRIP.AUTO: 6 [PH] (ref 5–8)
PROCALCITONIN SERPL-MCNC: <0.05 NG/ML
PROT UR QL STRIP: NEGATIVE MG/DL
PROTHROMBIN TIME: 27.9 SEC (ref 12–14.6)
RBC UR QL AUTO: NEGATIVE
SARS-COV-2 RNA RESP QL NAA+PROBE: NOTDETECTED
SP GR UR STRIP.AUTO: 1.01
SPECIMEN SOURCE: NORMAL
UROBILINOGEN UR STRIP.AUTO-MCNC: 0.2 MG/DL

## 2021-04-01 PROCEDURE — 83690 ASSAY OF LIPASE: CPT

## 2021-04-01 PROCEDURE — U0003 INFECTIOUS AGENT DETECTION BY NUCLEIC ACID (DNA OR RNA); SEVERE ACUTE RESPIRATORY SYNDROME CORONAVIRUS 2 (SARS-COV-2) (CORONAVIRUS DISEASE [COVID-19]), AMPLIFIED PROBE TECHNIQUE, MAKING USE OF HIGH THROUGHPUT TECHNOLOGIES AS DESCRIBED BY CMS-2020-01-R: HCPCS

## 2021-04-01 PROCEDURE — 94760 N-INVAS EAR/PLS OXIMETRY 1: CPT

## 2021-04-01 PROCEDURE — 94640 AIRWAY INHALATION TREATMENT: CPT

## 2021-04-01 PROCEDURE — A9270 NON-COVERED ITEM OR SERVICE: HCPCS | Performed by: NURSE PRACTITIONER

## 2021-04-01 PROCEDURE — 700111 HCHG RX REV CODE 636 W/ 250 OVERRIDE (IP): Performed by: NURSE PRACTITIONER

## 2021-04-01 PROCEDURE — 700101 HCHG RX REV CODE 250: Performed by: STUDENT IN AN ORGANIZED HEALTH CARE EDUCATION/TRAINING PROGRAM

## 2021-04-01 PROCEDURE — 81003 URINALYSIS AUTO W/O SCOPE: CPT

## 2021-04-01 PROCEDURE — 99220 PR INITIAL OBSERVATION CARE,LEVL III: CPT | Performed by: STUDENT IN AN ORGANIZED HEALTH CARE EDUCATION/TRAINING PROGRAM

## 2021-04-01 PROCEDURE — 93010 ELECTROCARDIOGRAM REPORT: CPT | Performed by: INTERNAL MEDICINE

## 2021-04-01 PROCEDURE — 700111 HCHG RX REV CODE 636 W/ 250 OVERRIDE (IP): Performed by: STUDENT IN AN ORGANIZED HEALTH CARE EDUCATION/TRAINING PROGRAM

## 2021-04-01 PROCEDURE — 770020 HCHG ROOM/CARE - TELE (206)

## 2021-04-01 PROCEDURE — U0005 INFEC AGEN DETEC AMPLI PROBE: HCPCS

## 2021-04-01 PROCEDURE — 83735 ASSAY OF MAGNESIUM: CPT

## 2021-04-01 PROCEDURE — A9270 NON-COVERED ITEM OR SERVICE: HCPCS | Performed by: STUDENT IN AN ORGANIZED HEALTH CARE EDUCATION/TRAINING PROGRAM

## 2021-04-01 PROCEDURE — 93005 ELECTROCARDIOGRAM TRACING: CPT | Performed by: STUDENT IN AN ORGANIZED HEALTH CARE EDUCATION/TRAINING PROGRAM

## 2021-04-01 PROCEDURE — 94669 MECHANICAL CHEST WALL OSCILL: CPT

## 2021-04-01 PROCEDURE — 96376 TX/PRO/DX INJ SAME DRUG ADON: CPT

## 2021-04-01 PROCEDURE — 96375 TX/PRO/DX INJ NEW DRUG ADDON: CPT

## 2021-04-01 PROCEDURE — 700102 HCHG RX REV CODE 250 W/ 637 OVERRIDE(OP): Performed by: STUDENT IN AN ORGANIZED HEALTH CARE EDUCATION/TRAINING PROGRAM

## 2021-04-01 PROCEDURE — 700102 HCHG RX REV CODE 250 W/ 637 OVERRIDE(OP): Performed by: NURSE PRACTITIONER

## 2021-04-01 RX ORDER — WARFARIN SODIUM 6 MG/1
6 TABLET ORAL DAILY
Status: DISCONTINUED | OUTPATIENT
Start: 2021-04-01 | End: 2021-04-01

## 2021-04-01 RX ORDER — METHYLPREDNISOLONE SODIUM SUCCINATE 40 MG/ML
40 INJECTION, POWDER, LYOPHILIZED, FOR SOLUTION INTRAMUSCULAR; INTRAVENOUS EVERY 8 HOURS
Status: DISCONTINUED | OUTPATIENT
Start: 2021-04-01 | End: 2021-04-03 | Stop reason: HOSPADM

## 2021-04-01 RX ORDER — MONTELUKAST SODIUM 10 MG/1
10 TABLET ORAL
Status: DISCONTINUED | OUTPATIENT
Start: 2021-04-01 | End: 2021-04-03 | Stop reason: HOSPADM

## 2021-04-01 RX ORDER — IPRATROPIUM BROMIDE AND ALBUTEROL SULFATE 2.5; .5 MG/3ML; MG/3ML
3 SOLUTION RESPIRATORY (INHALATION)
Status: DISCONTINUED | OUTPATIENT
Start: 2021-04-01 | End: 2021-04-02

## 2021-04-01 RX ORDER — ARIPIPRAZOLE 15 MG/1
30 TABLET ORAL EVERY MORNING
Status: DISCONTINUED | OUTPATIENT
Start: 2021-04-01 | End: 2021-04-03 | Stop reason: HOSPADM

## 2021-04-01 RX ORDER — FUROSEMIDE 10 MG/ML
20 INJECTION INTRAMUSCULAR; INTRAVENOUS
Status: DISCONTINUED | OUTPATIENT
Start: 2021-04-01 | End: 2021-04-03 | Stop reason: HOSPADM

## 2021-04-01 RX ORDER — ACETAMINOPHEN 325 MG/1
650 TABLET ORAL EVERY 6 HOURS PRN
Status: DISCONTINUED | OUTPATIENT
Start: 2021-04-01 | End: 2021-04-03 | Stop reason: HOSPADM

## 2021-04-01 RX ORDER — NYSTATIN 100000 [USP'U]/G
POWDER TOPICAL 2 TIMES DAILY
Status: DISCONTINUED | OUTPATIENT
Start: 2021-04-01 | End: 2021-04-03 | Stop reason: HOSPADM

## 2021-04-01 RX ORDER — IPRATROPIUM BROMIDE AND ALBUTEROL SULFATE 2.5; .5 MG/3ML; MG/3ML
3 SOLUTION RESPIRATORY (INHALATION)
Status: DISCONTINUED | OUTPATIENT
Start: 2021-04-01 | End: 2021-04-01

## 2021-04-01 RX ORDER — FUROSEMIDE 40 MG/1
20 TABLET ORAL DAILY
Status: DISCONTINUED | OUTPATIENT
Start: 2021-04-01 | End: 2021-04-01

## 2021-04-01 RX ORDER — HYDROXYZINE HYDROCHLORIDE 25 MG/1
25 TABLET, FILM COATED ORAL 3 TIMES DAILY PRN
Status: DISCONTINUED | OUTPATIENT
Start: 2021-04-01 | End: 2021-04-03 | Stop reason: HOSPADM

## 2021-04-01 RX ORDER — FLUTICASONE PROPIONATE 50 MCG
1 SPRAY, SUSPENSION (ML) NASAL 2 TIMES DAILY
COMMUNITY
End: 2021-07-16

## 2021-04-01 RX ORDER — ALBUTEROL SULFATE 90 UG/1
2 AEROSOL, METERED RESPIRATORY (INHALATION) EVERY 4 HOURS PRN
Status: DISCONTINUED | OUTPATIENT
Start: 2021-04-01 | End: 2021-04-03 | Stop reason: HOSPADM

## 2021-04-01 RX ORDER — LITHIUM CARBONATE 300 MG/1
600 TABLET, FILM COATED, EXTENDED RELEASE ORAL EVERY EVENING
Status: DISCONTINUED | OUTPATIENT
Start: 2021-04-01 | End: 2021-04-03 | Stop reason: HOSPADM

## 2021-04-01 RX ORDER — DOXYCYCLINE 100 MG/1
100 TABLET ORAL EVERY 12 HOURS
Status: DISCONTINUED | OUTPATIENT
Start: 2021-04-01 | End: 2021-04-03 | Stop reason: HOSPADM

## 2021-04-01 RX ORDER — LITHIUM CARBONATE 300 MG/1
300 TABLET, FILM COATED, EXTENDED RELEASE ORAL EVERY MORNING
Status: DISCONTINUED | OUTPATIENT
Start: 2021-04-01 | End: 2021-04-03 | Stop reason: HOSPADM

## 2021-04-01 RX ORDER — LITHIUM CARBONATE 300 MG
300 TABLET ORAL 3 TIMES DAILY
COMMUNITY
End: 2021-04-01

## 2021-04-01 RX ORDER — LITHIUM CARBONATE 300 MG/1
300-600 TABLET, FILM COATED, EXTENDED RELEASE ORAL 2 TIMES DAILY
Status: DISCONTINUED | OUTPATIENT
Start: 2021-04-01 | End: 2021-04-01

## 2021-04-01 RX ADMIN — LITHIUM CARBONATE 600 MG: 300 TABLET, FILM COATED, EXTENDED RELEASE ORAL at 18:02

## 2021-04-01 RX ADMIN — IPRATROPIUM BROMIDE AND ALBUTEROL SULFATE 3 ML: .5; 2.5 SOLUTION RESPIRATORY (INHALATION) at 03:17

## 2021-04-01 RX ADMIN — LITHIUM CARBONATE 300 MG: 300 TABLET, FILM COATED, EXTENDED RELEASE ORAL at 05:16

## 2021-04-01 RX ADMIN — ACETAMINOPHEN 650 MG: 325 TABLET, FILM COATED ORAL at 16:14

## 2021-04-01 RX ADMIN — IPRATROPIUM BROMIDE AND ALBUTEROL SULFATE 3 ML: .5; 2.5 SOLUTION RESPIRATORY (INHALATION) at 11:19

## 2021-04-01 RX ADMIN — FUROSEMIDE 20 MG: 40 TABLET ORAL at 05:16

## 2021-04-01 RX ADMIN — DOXYCYCLINE 100 MG: 100 TABLET, FILM COATED ORAL at 14:03

## 2021-04-01 RX ADMIN — METHYLPREDNISOLONE SODIUM SUCCINATE 40 MG: 40 INJECTION, POWDER, FOR SOLUTION INTRAMUSCULAR; INTRAVENOUS at 20:51

## 2021-04-01 RX ADMIN — IPRATROPIUM BROMIDE AND ALBUTEROL SULFATE 3 ML: .5; 2.5 SOLUTION RESPIRATORY (INHALATION) at 14:26

## 2021-04-01 RX ADMIN — ARIPIPRAZOLE 30 MG: 15 TABLET ORAL at 05:16

## 2021-04-01 RX ADMIN — NYSTATIN: 100000 POWDER TOPICAL at 18:05

## 2021-04-01 RX ADMIN — NYSTATIN: 100000 POWDER TOPICAL at 12:47

## 2021-04-01 RX ADMIN — MONTELUKAST 10 MG: 10 TABLET, FILM COATED ORAL at 20:51

## 2021-04-01 RX ADMIN — HYDROXYZINE HYDROCHLORIDE 25 MG: 25 TABLET, FILM COATED ORAL at 20:51

## 2021-04-01 RX ADMIN — WARFARIN SODIUM 6 MG: 6 TABLET ORAL at 02:55

## 2021-04-01 RX ADMIN — METHYLPREDNISOLONE SODIUM SUCCINATE 40 MG: 40 INJECTION, POWDER, FOR SOLUTION INTRAMUSCULAR; INTRAVENOUS at 14:19

## 2021-04-01 RX ADMIN — FUROSEMIDE 20 MG: 10 INJECTION, SOLUTION INTRAMUSCULAR; INTRAVENOUS at 12:47

## 2021-04-01 RX ADMIN — IPRATROPIUM BROMIDE AND ALBUTEROL SULFATE 3 ML: .5; 2.5 SOLUTION RESPIRATORY (INHALATION) at 21:37

## 2021-04-01 RX ADMIN — METHYLPREDNISOLONE SODIUM SUCCINATE 40 MG: 40 INJECTION, POWDER, FOR SOLUTION INTRAMUSCULAR; INTRAVENOUS at 05:15

## 2021-04-01 RX ADMIN — IPRATROPIUM BROMIDE AND ALBUTEROL SULFATE 3 ML: .5; 2.5 SOLUTION RESPIRATORY (INHALATION) at 07:56

## 2021-04-01 ASSESSMENT — LIFESTYLE VARIABLES
CONSUMPTION TOTAL: NEGATIVE
AVERAGE NUMBER OF DAYS PER WEEK YOU HAVE A DRINK CONTAINING ALCOHOL: 0
ON A TYPICAL DAY WHEN YOU DRINK ALCOHOL HOW MANY DRINKS DO YOU HAVE: 0
HOW MANY TIMES IN THE PAST YEAR HAVE YOU HAD 5 OR MORE DRINKS IN A DAY: 0
HAVE YOU EVER FELT YOU SHOULD CUT DOWN ON YOUR DRINKING: NO
TOTAL SCORE: 0
DOES PATIENT WANT TO STOP DRINKING: NO
ALCOHOL_USE: NO
HAVE PEOPLE ANNOYED YOU BY CRITICIZING YOUR DRINKING: NO
EVER HAD A DRINK FIRST THING IN THE MORNING TO STEADY YOUR NERVES TO GET RID OF A HANGOVER: NO
EVER FELT BAD OR GUILTY ABOUT YOUR DRINKING: NO

## 2021-04-01 ASSESSMENT — COPD QUESTIONNAIRES
DO YOU EVER COUGH UP ANY MUCUS OR PHLEGM?: YES, EVERY DAY
COPD SCREENING SCORE: 7
DURING THE PAST 4 WEEKS HOW MUCH DID YOU FEEL SHORT OF BREATH: MOST  OR ALL OF THE TIME
HAVE YOU SMOKED AT LEAST 100 CIGARETTES IN YOUR ENTIRE LIFE: YES

## 2021-04-01 ASSESSMENT — ENCOUNTER SYMPTOMS
CARDIOVASCULAR NEGATIVE: 1
GASTROINTESTINAL NEGATIVE: 1
SHORTNESS OF BREATH: 1
MUSCULOSKELETAL NEGATIVE: 1
PSYCHIATRIC NEGATIVE: 1
CONSTITUTIONAL NEGATIVE: 1
EYES NEGATIVE: 1

## 2021-04-01 ASSESSMENT — PATIENT HEALTH QUESTIONNAIRE - PHQ9
3. TROUBLE FALLING OR STAYING ASLEEP OR SLEEPING TOO MUCH: SEVERAL DAYS
2. FEELING DOWN, DEPRESSED, IRRITABLE, OR HOPELESS: SEVERAL DAYS
1. LITTLE INTEREST OR PLEASURE IN DOING THINGS: SEVERAL DAYS
5. POOR APPETITE OR OVEREATING: SEVERAL DAYS
SUM OF ALL RESPONSES TO PHQ QUESTIONS 1-9: 7
9. THOUGHTS THAT YOU WOULD BE BETTER OFF DEAD, OR OF HURTING YOURSELF: SEVERAL DAYS
8. MOVING OR SPEAKING SO SLOWLY THAT OTHER PEOPLE COULD HAVE NOTICED. OR THE OPPOSITE, BEING SO FIGETY OR RESTLESS THAT YOU HAVE BEEN MOVING AROUND A LOT MORE THAN USUAL: NOT AT ALL
6. FEELING BAD ABOUT YOURSELF - OR THAT YOU ARE A FAILURE OR HAVE LET YOURSELF OR YOUR FAMILY DOWN: NOT AL ALL
SUM OF ALL RESPONSES TO PHQ9 QUESTIONS 1 AND 2: 2
4. FEELING TIRED OR HAVING LITTLE ENERGY: MORE THAN HALF THE DAYS
7. TROUBLE CONCENTRATING ON THINGS, SUCH AS READING THE NEWSPAPER OR WATCHING TELEVISION: NOT AT ALL

## 2021-04-01 ASSESSMENT — CHA2DS2 SCORE
HYPERTENSION: NO
SEX: FEMALE
VASCULAR DISEASE: NO
PRIOR STROKE OR TIA OR THROMBOEMBOLISM: NO
AGE 65 TO 74: NO
CHF OR LEFT VENTRICULAR DYSFUNCTION: NO
AGE 75 OR GREATER: NO
DIABETES: NO
CHA2DS2 VASC SCORE: 1

## 2021-04-01 ASSESSMENT — PAIN DESCRIPTION - PAIN TYPE: TYPE: ACUTE PAIN

## 2021-04-01 ASSESSMENT — FIBROSIS 4 INDEX
FIB4 SCORE: 0.5
FIB4 SCORE: 0.5

## 2021-04-01 NOTE — CARE PLAN
Problem: Communication  Goal: The ability to communicate needs accurately and effectively will improve  Outcome: PROGRESSING AS EXPECTED     Problem: Safety  Goal: Will remain free from injury  Outcome: PROGRESSING AS EXPECTED  Goal: Will remain free from falls  Outcome: PROGRESSING AS EXPECTED  Note: Pt educated on safety precautions, utilization of the call light, and bed alarm.  Pt verbalized understanding.      Problem: Pain Management  Goal: Pain level will decrease to patient's comfort goal  Outcome: PROGRESSING AS EXPECTED     Problem: Infection  Goal: Will remain free from infection  Outcome: PROGRESSING AS EXPECTED  Note: Implement standard precautions and perform handwashing before and after patient contact. RN will follow protocols and necessary steps to minimize the spread of infection.  RN educated pt and any visitors on proper hand hygiene.      Problem: Psychosocial Needs:  Goal: Level of anxiety will decrease  Outcome: PROGRESSING AS EXPECTED     Problem: Respiratory:  Goal: Respiratory status will improve  Outcome: PROGRESSING AS EXPECTED     Problem: Skin Integrity  Goal: Risk for impaired skin integrity will decrease  Outcome: PROGRESSING AS EXPECTED     Problem: Mobility  Goal: Risk for activity intolerance will decrease  Outcome: PROGRESSING AS EXPECTED     Problem: Knowledge Deficit  Goal: Knowledge of disease process/condition, treatment plan, diagnostic tests, and medications will improve  Outcome: PROGRESSING AS EXPECTED  Goal: Knowledge of the prescribed therapeutic regimen will improve  Outcome: PROGRESSING AS EXPECTED

## 2021-04-01 NOTE — H&P
Hospital Medicine History & Physical Note    Date of Service  4/1/2021    Primary Care Physician  SHANKAR Gleason    Consultants  None     Code Status  Full Code    Chief Complaint  Chief Complaint   Patient presents with   • Shortness of Breath     x 2 days, Hx CHF, asthma       History of Presenting Illness  45 y.o. female who presented 3/31/2021 with shortness of breath for the last 2 days.  She is on home oxygen nasal cannula.  She states that her shortness of breath has been worsening, causing her to come into the ED for evaluation.  She states that she has been complaining of abdominal rash and mild abd pain in her umbilical area.  Denies fever chills cough nausea vomiting.    Patient recently discharged from [facility] 8 days ago.  She presented and was admitted for COPD exacerbation. She received steroids, azithromycin, lasix PO, Incentive spirometry and breathing treatment, she was discharged for prednisone 40 mg for 4 days.  She was also found to have new onset A. fib.  Systolic ejection fraction 60%.  She was started on Lovenox and then bridged to Coumadin, and was recommended skilled nursing facility however refused, and was discharged home shortly after.      Review of Systems  Review of Systems   Constitutional: Negative.    HENT: Negative.    Eyes: Negative.    Respiratory: Positive for shortness of breath.    Cardiovascular: Negative.    Gastrointestinal: Negative.    Genitourinary: Negative.    Musculoskeletal: Negative.    Skin: Negative.    Endo/Heme/Allergies: Negative.    Psychiatric/Behavioral: Negative.        Past Medical History   has a past medical history of Asthma, Bipolar 2 disorder (HCC), Chickenpox, Chronic obstructive pulmonary disease (HCC), Hypertension, Other psychological stress, and Schizophrenic disorder (HCC).    Surgical History   has a past surgical history that includes hysterectomy laparoscopy; colectomy; cholecystectomy; and knee arthroscopy (Left).     Family  History  family history includes Cancer in her sister; No Known Problems in her mother.     Social History   reports that she quit smoking about 3 months ago. Her smoking use included cigarettes. She has a 20.00 pack-year smoking history. She has never used smokeless tobacco. She reports that she does not drink alcohol and does not use drugs.    Allergies  Allergies   Allergen Reactions   • Amoxicillin Rash and Itching     Tolerates cephalosporins, pt reports that she gets a rash all over her body and gets itchy   • Penicillin G Rash and Itching     pt reports that she gets a rash all over her body and gets itchy       Medications  Prior to Admission Medications   Prescriptions Last Dose Informant Patient Reported? Taking?   albuterol 108 (90 Base) MCG/ACT Aero Soln inhalation aerosol   No No   Sig: Inhale 2 Puffs every 6 hours as needed for Shortness of Breath.   aripiprazole (ABILIFY) 30 MG tablet  Patient Yes No   Sig: Take 30 mg by mouth every morning.   atorvastatin (LIPITOR) 20 MG Tab  Patient No No   Sig: Take 1 Tab by mouth every day.   Patient not taking: Reported on 3/19/2021   furosemide (LASIX) 20 MG Tab  Patient No No   Sig: Take 1 Tab by mouth every day.   ipratropium-albuterol (DUONEB) 0.5-2.5 (3) MG/3ML nebulizer solution  Patient No No   Sig: 3 mL by Nebulization route every 6 hours as needed for Shortness of Breath.   lithium CR (LITHOBID) 300 MG Tab CR  Patient Yes No   Sig: Take 300-600 mg by mouth 2 Times a Day. 300 mg in AM and 600 mg at bedtime   montelukast (SINGULAIR) 10 MG Tab  Patient No No   Sig: Take 1 Tab by mouth every bedtime.   nystatin (MYCOSTATIN) powder   No No   Sig: Apply underneath breasts and on inner thigh area twice a day for fungal infection   senna-docusate (PERICOLACE OR SENOKOT S) 8.6-50 MG Tab   No No   Sig: Take 1 Tab by mouth every day.   thiamine (THIAMINE) 100 MG tablet   No No   Sig: Take 1 Tablet by mouth every day.   Patient not taking: Reported on 3/19/2021    tiotropium (SPIRIVA HANDIHALER) 18 MCG Cap  Patient No No   Sig: Inhale 1 Cap by mouth every day.   warfarin (COUMADIN) 6 MG Tab   No No   Sig: Take 1 tablet by mouth every day for 30 days.      Facility-Administered Medications: None       Physical Exam  Temp:  [36.2 °C (97.2 °F)] 36.2 °C (97.2 °F)  Pulse:  [78-82] 78  Resp:  [18-26] 26  BP: (138)/(89) 138/89  SpO2:  [98 %-100 %] 98 %    Physical Exam  Constitutional:       Appearance: Normal appearance. She is obese.      Comments: Looks older than stated age   HENT:      Head: Normocephalic.      Nose: Nose normal.      Mouth/Throat:      Mouth: Mucous membranes are moist.   Eyes:      Pupils: Pupils are equal, round, and reactive to light.   Cardiovascular:      Rate and Rhythm: Normal rate and regular rhythm.      Pulses: Normal pulses.   Pulmonary:      Comments: Audible wheezing heard  Diffuse wheezing heard with lung auscultation  Speaking complete sentences  Abdominal:      General: Abdomen is flat.      Palpations: Abdomen is soft.      Comments: Area of ecchymosis noted bilaterally in umbilical region, likely from Lovenox shots   Musculoskeletal:         General: Normal range of motion.      Cervical back: Normal range of motion.      Comments: Chronic venous changes noted  Bilateral pitting edema   Skin:     General: Skin is warm.   Neurological:      General: No focal deficit present.      Mental Status: She is alert and oriented to person, place, and time.           Laboratory:  Recent Labs     03/31/21  2220   WBC 15.0*   RBC 4.53   HEMOGLOBIN 12.6   HEMATOCRIT 41.9   MCV 92.5   MCH 27.8   MCHC 30.1*   RDW 54.6*   PLATELETCT 273   MPV 11.0     Recent Labs     03/31/21  2220   SODIUM 140   POTASSIUM 3.8   CHLORIDE 102   CO2 30   GLUCOSE 113*   BUN 9   CREATININE 0.82   CALCIUM 8.9     Recent Labs     03/31/21  2220   ALTSGPT 20   ASTSGOT 13   ALKPHOSPHAT 95   TBILIRUBIN 1.0   GLUCOSE 113*     Recent Labs     03/31/21  2220   APTT 62.0*   INR 2.52*      Recent Labs     03/31/21  2220   NTPROBNP 131*         Recent Labs     03/31/21  2220   TROPONINT 26*       Imaging:  DX-CHEST-PORTABLE (1 VIEW)   Final Result         1.  Hazy left lung base opacity could represent infiltrate.            Assessment/Plan:  I anticipate this patient will require at least two midnights for appropriate medical management, necessitating inpatient admission.    * Acute exacerbation of chronic obstructive pulmonary disease (COPD) ([facility])- (present on admission)  Assessment & Plan  Admit patient to medical floor  DuoNebs every 4 hours for 3 doses, can reassess frequency after  Loaded with solumedrol 125 mg x 1 in ED, continue solumedrol 40 mg q6h  Recently completed course of azithromycin, can hold antibiotics for now  Continue montelukast 10 mg p.o. daily  Continue Lasix 20 mg p.o. daily  Oxygen as needed, keep O2 > 90%      A-fib (HCC)- (present on admission)  Assessment & Plan  INR therapeutic 2.4  Continue coumadin 6 mg po qhs     Hx of  Bipolar affective disorder, and schizophrenia- (present on admission)  Assessment & Plan  Continue Abilify 30 mg p.o. daily  Continue lithium 300 mg p.o. a.m. and 600 mg every night

## 2021-04-01 NOTE — ED NOTES
Med Rec completed per patient   Allergies reviewed  No ORAL antibiotics in last 14 days      Patient is on Warfarin 6 mg once daily (reports she is suppose to be on 6 mg every day until Friday then get her INR rechecked)    Home Pharmacy: [id number]

## 2021-04-01 NOTE — PROGRESS NOTES
[place] VSS on arrival. Pt able to stand and pivot to bed. Pt on 3L NC with SOB. 2 RN skin assessment completed by this RN and Lazara GREENE. BLE edema and redness, BLE scaling and redness/excoriation noted to upper thighs, groin and panus. Safety precautions and call bell use explained to pt. Call bell and belongings left in reach of pt. Bed locked in lowest position.

## 2021-04-01 NOTE — CARE PLAN
Problem: Communication  Goal: The ability to communicate needs accurately and effectively will improve  Outcome: PROGRESSING AS EXPECTED     Problem: Safety  Goal: Will remain free from falls  Outcome: PROGRESSING AS EXPECTED     Problem: Infection  Goal: Will remain free from infection  Outcome: PROGRESSING AS EXPECTED     Problem: Respiratory:  Goal: Respiratory status will improve  Outcome: PROGRESSING AS EXPECTED

## 2021-04-01 NOTE — PROGRESS NOTES
Report received from JC Hill.  Patient sitting up in bed resting.  POC gone over with pt and all questions have been answered.  Patient A & O  X 4.  No apparent signs of distress.  Safety precautions in place.  Patient educated to call for assistance.  Will continue to monitor.

## 2021-04-01 NOTE — ED TRIAGE NOTES
Chief Complaint   Patient presents with   • Shortness of Breath     x 2 days, Hx CHF, asthma     Pt BIB EMS for SOB x 2 days. Hx asthma, used rescue inhaler PTA. RR 18, 100% 2L NC home O2.     /89   Pulse 82   Temp 36.2 °C (97.2 °F) (Temporal)   Resp 18   LMP  (LMP Unknown)   SpO2 100%

## 2021-04-01 NOTE — ED PROVIDER NOTES
"ED Provider Note    CHIEF COMPLAINT  Chief Complaint   Patient presents with   • Shortness of Breath     x 2 days, Hx CHF, asthma       HPI  Lorene Haro is a 45 y.o. female with a history of COPD and hypertension who presents with a chief complaint of shortness of breath that has been ongoing for 2 days.  She was recently admitted to this facility for shortness of breath and was discharged with home oxygen to be used at 1 L/min.  Today she had to increase it to 2 L due to worsening shortness of breath.  She notes a cough without sputum production.  She had chest pain tonight and ultimately called EMS.  The chest pain felt more like \"pressure\", like something was sitting on her chest.  She took Tylenol.  Her symptoms have almost resolved.  She endorses lower extremity edema  But denies any fevers or chills.  She has some nausea but has not vomited.  She also complains of a rash on her abdomen with associated pain.    REVIEW OF SYSTEMS  See HPI for further details.  Shortness of breath.  Nonproductive cough.  Chest pain.  Nausea.  Bilateral lower extremity swelling.  Rash.  All other systems are negative.     PAST MEDICAL HISTORY   has a past medical history of Asthma, Bipolar 2 disorder (HCC), Chickenpox, Chronic obstructive pulmonary disease (HCC), Hypertension, Other psychological stress, and Schizophrenic disorder ([facility]).    SOCIAL HISTORY  Social History     Tobacco Use   • Smoking status: Former Smoker     Packs/day: 1.00     Years: 20.00     Pack years: 20.00     Types: Cigarettes     Quit date: 2020     Years since quittin.2   • Smokeless tobacco: Never Used   • Tobacco comment: Patient states she quit two days ago    Substance and Sexual Activity   • Alcohol use: No   • Drug use: No   • Sexual activity: Not on file       SURGICAL HISTORY   has a past surgical history that includes hysterectomy laparoscopy; colectomy; cholecystectomy; and knee arthroscopy (Left).    CURRENT MEDICATIONS  Home " Medications     Reviewed by Shelbie Delacruz R.N. (Registered Nurse) on 03/31/21 at 2227  Med List Status: Not Addressed   Medication Last Dose Status   albuterol 108 (90 Base) MCG/ACT Aero Soln inhalation aerosol  Active   aripiprazole (ABILIFY) 30 MG tablet  Active   atorvastatin (LIPITOR) 20 MG Tab  Active   furosemide (LASIX) 20 MG Tab  Active   ipratropium-albuterol (DUONEB) 0.5-2.5 (3) MG/3ML nebulizer solution  Active   lithium CR (LITHOBID) 300 MG Tab CR  Active   montelukast (SINGULAIR) 10 MG Tab  Active   nystatin (MYCOSTATIN) powder  Active   senna-docusate (PERICOLACE OR SENOKOT S) 8.6-50 MG Tab  Active   thiamine (THIAMINE) 100 MG tablet  Active   tiotropium (SPIRIVA HANDIHALER) 18 MCG Cap  Active   warfarin (COUMADIN) 6 MG Tab  Active                ALLERGIES  Allergies   Allergen Reactions   • Amoxicillin Rash and Itching     Tolerates cephalosporins, pt reports that she gets a rash all over her body and gets itchy   • Penicillin G Rash and Itching     pt reports that she gets a rash all over her body and gets itchy       PHYSICAL EXAM  VITAL SIGNS: /89   Pulse 82   Temp 36.2 °C (97.2 °F) (Temporal)   Resp 18   LMP  (LMP Unknown)   SpO2 100%    Pulse ox interpretation: I interpret this pulse ox as normal.  Constitutional: Alert, chronically ill-appearing female in mild distress.  HENT: No signs of trauma, Bilateral external ears normal, Nose normal.  Moist mucous membranes.  Eyes: Pupils are equal and reactive, Conjunctiva normal, Non-icteric.   Neck: Normal range of motion, No tenderness, Supple, No stridor.   Lymphatic: No lymphadenopathy noted.   Cardiovascular: Regular rate and rhythm, no murmurs. Pulses symmetrical.  Thorax & Lungs: Decreased breath sounds in all lung fields, is a cannula in place, no respiratory distress, diffuse expiratory wheezes, No chest tenderness.   Abdomen: Bowel sounds normal, Soft, tender over bilateral lower quadrants (directly over what appears to be areas of  ecchymosis), No masses, No pulsatile masses. No peritoneal signs.  Skin: Warm, Dry, No erythema, rash/ecchymosis over abdomen.   Back: Normal alignment.  Extremities: Intact distal pulses, body habitus makes it difficult to determine if there is edema, No tenderness, No cyanosis.  Musculoskeletal: No major deformities noted.   Neurologic: Alert, No focal deficits noted.   Psychiatric: Affect normal, Judgment normal, Mood normal.     DIAGNOSTIC STUDIES / PROCEDURES    LABS  Results for orders placed or performed during the hospital encounter of 03/31/21   CBC with Differential   Result Value Ref Range    WBC 15.0 (H) 4.8 - 10.8 K/uL    RBC 4.53 4.20 - 5.40 M/uL    Hemoglobin 12.6 12.0 - 16.0 g/dL    Hematocrit 41.9 37.0 - 47.0 %    MCV 92.5 81.4 - 97.8 fL    MCH 27.8 27.0 - 33.0 pg    MCHC 30.1 (L) 33.6 - 35.0 g/dL    RDW 54.6 (H) 35.9 - 50.0 fL    Platelet Count 273 164 - 446 K/uL    MPV 11.0 9.0 - 12.9 fL    Neutrophils-Polys 82.80 (H) 44.00 - 72.00 %    Lymphocytes 9.50 (L) 22.00 - 41.00 %    Monocytes 5.90 0.00 - 13.40 %    Eosinophils 1.30 0.00 - 6.90 %    Basophils 0.10 0.00 - 1.80 %    Immature Granulocytes 0.40 0.00 - 0.90 %    Nucleated RBC 0.00 /100 WBC    Neutrophils (Absolute) 12.42 (H) 2.00 - 7.15 K/uL    Lymphs (Absolute) 1.42 1.00 - 4.80 K/uL    Monos (Absolute) 0.89 (H) 0.00 - 0.85 K/uL    Eos (Absolute) 0.19 0.00 - 0.51 K/uL    Baso (Absolute) 0.02 0.00 - 0.12 K/uL    Immature Granulocytes (abs) 0.06 0.00 - 0.11 K/uL    NRBC (Absolute) 0.00 K/uL   Complete Metabolic Panel (CMP)   Result Value Ref Range    Sodium 140 135 - 145 mmol/L    Potassium 3.8 3.6 - 5.5 mmol/L    Chloride 102 96 - 112 mmol/L    Co2 30 20 - 33 mmol/L    Anion Gap 8.0 7.0 - 16.0    Glucose 113 (H) 65 - 99 mg/dL    Bun 9 8 - 22 mg/dL    Creatinine 0.82 0.50 - 1.40 mg/dL    Calcium 8.9 8.5 - 10.5 mg/dL    AST(SGOT) 13 12 - 45 U/L    ALT(SGPT) 20 2 - 50 U/L    Alkaline Phosphatase 95 30 - 99 U/L    Total Bilirubin 1.0 0.1 - 1.5  mg/dL    Albumin 3.7 3.2 - 4.9 g/dL    Total Protein 6.7 6.0 - 8.2 g/dL    Globulin 3.0 1.9 - 3.5 g/dL    A-G Ratio 1.2 g/dL   Troponin   Result Value Ref Range    Troponin T 26 (H) 6 - 19 ng/L   LACTIC ACID   Result Value Ref Range    Lactic Acid 1.4 0.5 - 2.0 mmol/L   APTT   Result Value Ref Range    APTT 62.0 (H) 24.7 - 36.0 sec   PROTHROMBIN TIME   Result Value Ref Range    PT 27.9 (H) 12.0 - 14.6 sec    INR 2.52 (H) 0.87 - 1.13   URINALYSIS    Specimen: Urine, Cath   Result Value Ref Range    Color Yellow     Character Clear     Specific Gravity 1.013 <1.035    Ph 6.0 5.0 - 8.0    Glucose Negative Negative mg/dL    Ketones Negative Negative mg/dL    Protein Negative Negative mg/dL    Bilirubin Negative Negative    Urobilinogen, Urine 0.2 Negative    Nitrite Negative Negative    Leukocyte Esterase Negative Negative    Occult Blood Negative Negative    Micro Urine Req see below    proBrain Natriuretic Peptide, NT   Result Value Ref Range    NT-proBNP 131 (H) 0 - 125 pg/mL   PROCALCITONIN   Result Value Ref Range    Procalcitonin <0.05 <0.25 ng/mL   ESTIMATED GFR   Result Value Ref Range    GFR If African American >60 >60 mL/min/1.73 m 2    GFR If Non African American >60 >60 mL/min/1.73 m 2   SARS-CoV-2 PCR (24 hour In-House): Collect NP swab in VTM    Specimen: Nasopharyngeal; Respirate   Result Value Ref Range    SARS-CoV-2 Source NP Swab     SARS-CoV-2 by PCR NotDetected    LIPASE   Result Value Ref Range    Lipase 15 11 - 82 U/L   EKG   Result Value Ref Range    Report       [facility] Emergency Dept.    Test Date:  2021  Pt Name:    ADELINA MILLAN                Department: ER  MRN:        [id number]                      Room:       [facility]  Gender:     Female                       Technician: [id number]  :        [id number]                   Requested By:CAT ALEGRE  Order #:    [id number]                    Reading MD: Cat Alegre MD    Measurements  Intervals                                 Axis  Rate:       75                           P:  MS:                                      QRS:        59  QRSD:       96                           T:          -13  QT:         388  QTc:        434    Interpretive Statements  sinus rhythm  BORDERLINE T ABNORMALITIES, INFERIOR LEADS  Compared to ECG 03/20/2021 23:20:59  T-wave abnormality now present  Sinus bradycardia no longer present  Atrial premature complex(es) no longer present  Electronically Signed On 3- 23:39:20 PDT by Dawit Orellana MD       RADIOLOGY  CXR    COURSE & MEDICAL DECISION MAKING  Pertinent Labs & Imaging studies reviewed. (See chart for details)  Records obtained and reviewed: Patient was most recently admitted to this facility for 5 days starting on 3/18/2021.  She was short of breath, wheezing.  Procalcitonin was negative.  BNP slightly elevated to 489.  Chest x-ray demonstrated mild pulmonary edema or infiltrate.  She received steroid, antibiotics (azithromycin), Lasix, I-S, and RT breathing treatment.  Wheezing improved and she was discharged with p.o. prednisone 40 mg daily for 4 days.  Home oxygen was ordered.  She was noted to have new onset A. fib on admission.  A TTE demonstrated normal left ventricular systolic function with an EF of 60%.  TSH was normal.  She was started on Lovenox and switched to Coumadin.  She was offered SNF but refused.    Patient was most recently seen in this emergency department 3/4/2021 for cough, shortness of breath, fatigue, and nausea.  Noted that she has a history of COPD.  Chest x-ray revealed moderate cardiomegaly.  Symptoms felt to be due to acute bronchitis/COPD exacerbation.  She had been initiated on antibiotics by telemed physician.  She was given steroids and albuterol and was feeling better.  She was ultimately discharged in stable condition to follow-up with primary care physician.    This is a 45 year old female here with shortness of breath, chest pressure and cough. DDx includes,  but is not limited to, ACS, COPD/asthma exacerbation, PNA, PTX, viral syndrome, PE. Arrives AF with normal VS. Appears chronically ill. Nasal cannula in place. Significant wheezing in bilateral lungs suggests COPD exacerbation. Started on steroids, duoneb.    She does have a leukocytosis but has been on steroids recently. Metabolic panel is normal. Troponin is slightly elevated to 26, BNP just over normal at 131. Lactic acid is normal. INR is therapeutic. Procalcitonin is negative. COVID-19 negative.    She does have bruising on her abdomen consistent with areas of lovenox injections from her prior hospitalization. She is tender only over the areas. Not anemic. Imaging deferred.    Patient discussed with hospitalist and will be admitted for continued management of her COPD exacerbation and trending of her elevated troponin.    FINAL IMPRESSION  1. COPD exacerbation  2. Elevated troponin    Electronically signed by: Dawit Orellana M.D., 3/31/2021 10:28 PM

## 2021-04-01 NOTE — PROGRESS NOTES
Inpatient Anticoagulation Service Note    Date: 4/1/2021  Reason for Anticoagulation: Atrial Fibrillation   [id number] VASc Score: 1  HAS-BLED Score: 0    Hemoglobin Value: 12.6  Hematocrit Value: 41.9  Lab Platelet Value: 273  Target INR: 2.0 to 3.0    INR from last 7 days     Date/Time INR Value    03/31/21 2220  (!) 2.52        Dose from last 7 days     Date/Time Dose (mg)    04/01/21 0226  6        Average Dose (mg): 6  Home dose: 6 mg PO daily  Significant Interactions: Statin, Corticosteroids  Bridge Therapy: No   Reversal Agent Administered: Not Applicable    Comments: Patient received a dose on 3/31 at home and another dose at 0300 today on 4/1. Will repeat INR tomorrow AM and pending INR, will redose patient as appropriate. No s/sx of bleeding, no significant DDI noted.    Plan:  Hold warfarin today since patient received a dose at ~0300 instead of 1800  Education Material Provided?: No  Pharmacist suggested discharge dosing: most likely will resume home dose at 6 mg PO daily.      Emma Chavez, PharmD

## 2021-04-01 NOTE — ASSESSMENT & PLAN NOTE
Patient refused SNF during last admission, is more amenable to placement this admission.  Home health is not an option.  Patient lives alone

## 2021-04-01 NOTE — PROGRESS NOTES
Interval Problem Update    Patient is lying in bed, appears to be in mild distress.  She reports her work of breathing is significantly improved, however she still has an 8/10 intermittent headache and dizziness when she sits up. She also has pain in her abdominal skin folds and groin.  She has had a rash there for months.  She feels anxious after her DuoNeb treatment.  She denies nausea, pain on inspiration and any other problems.    -lungs are diminished, slight wheezes in all lung fields  -Chest x-ray shows left-sided opacity.  Concern for pneumonia. Started doxycycline.  -Continue respiratory medications, transfer to medical floor  -Tremulous hands, R>L.  Patient states this is her baseline  -Magnesium level for leg cramps reported this afternoon  -large erythematous rash to bilateral abdominal skin folds and groin. Nystatin powder.  -Bilateral lower extremity 1+ edema, rash.  Likely venous insufficiency.  History of cellulitis  -Atarax as needed for anxiety  -Patient refused SNF during last admission, is more amenable to placement today.  Home health is not an option.  Patient lives alone.  -Heart sounds distant  -Urinalysis negative for infection    Coumadin for VTE prophylaxis    LOUISE Donis.

## 2021-04-01 NOTE — ASSESSMENT & PLAN NOTE
Admit patient to medical floor  DuoNebs every 4 hours for 3 doses, can reassess frequency after  Loaded with solumedrol 125 mg x 1 in ED, continue solumedrol 40 mg q6h  Recently completed course of azithromycin.    Left-sided opacity on chest x-ray.  Started doxycycline for concern for pneumonia.  Continue montelukast 10 mg p.o. daily  Continue Lasix 20 mg p.o. daily  Oxygen as needed, keep O2 > 90%

## 2021-04-01 NOTE — PROGRESS NOTES
Inpatient Anticoagulation Service Note    Date: 4/1/2021        Target INR: 2.0 to 3.0         Hemoglobin Value: 12.6  Hematocrit Value: 41.9  Lab Platelet Value: 273    INR from last 7 days     Date/Time INR Value    03/31/21 2220  (!) 2.52        Dose from last 7 days     Date/Time Dose (mg)    04/01/21 0226  6        Average Dose (mg): 6  Significant Interactions: Statin  Bridge Therapy: No (If less than 5 days and overlap therapy discontinued -- document reason (i.e. Bleed Risk))    (If still on overlap therapy, if No -- document reason (i.e. Bleed Risk))    Reversal Agent Administered: Not Applicable  Comments: patiet appears relatively stable on 6mg daily of warfarin. Repeat INR scheduled for 4/3    Plan:  Continue home dosing regimen of 6mg daily as patient's INR today therapeutic. Repeat INR scheduled for 4/3.  Education Material Provided?: No  Pharmacist suggested discharge dosing: Assuming 6mg daily if repeat INR remains stable     Sylwia Gaines, HildaD

## 2021-04-01 NOTE — CARE PLAN
Respiratory Update    Treatment modality: duoneb  Frequency: q4     Pt tolerating current treatments well with no adverse reactions.

## 2021-04-01 NOTE — ED NOTES
Pt transport to inpatient floor with transport tech on 3L NC oxygen on gurney. All pt belongings with pt to floor. Pt left ED a/o x 4 GCS 15.

## 2021-04-02 LAB
ANION GAP SERPL CALC-SCNC: 6 MMOL/L (ref 7–16)
BASOPHILS # BLD AUTO: 0 % (ref 0–1.8)
BASOPHILS # BLD: 0 K/UL (ref 0–0.12)
BUN SERPL-MCNC: 13 MG/DL (ref 8–22)
CALCIUM SERPL-MCNC: 9.5 MG/DL (ref 8.5–10.5)
CHLORIDE SERPL-SCNC: 100 MMOL/L (ref 96–112)
CO2 SERPL-SCNC: 31 MMOL/L (ref 20–33)
CREAT SERPL-MCNC: 0.91 MG/DL (ref 0.5–1.4)
EOSINOPHIL # BLD AUTO: 0 K/UL (ref 0–0.51)
EOSINOPHIL NFR BLD: 0 % (ref 0–6.9)
ERYTHROCYTE [DISTWIDTH] IN BLOOD BY AUTOMATED COUNT: 53.1 FL (ref 35.9–50)
GLUCOSE SERPL-MCNC: 173 MG/DL (ref 65–99)
HCT VFR BLD AUTO: 40.7 % (ref 37–47)
HGB BLD-MCNC: 12.1 G/DL (ref 12–16)
INR PPP: 3.35 (ref 0.87–1.13)
LYMPHOCYTES # BLD AUTO: 0.33 K/UL (ref 1–4.8)
LYMPHOCYTES NFR BLD: 3.4 % (ref 22–41)
MANUAL DIFF BLD: NORMAL
MCH RBC QN AUTO: 27.4 PG (ref 27–33)
MCHC RBC AUTO-ENTMCNC: 29.7 G/DL (ref 33.6–35)
MCV RBC AUTO: 92.1 FL (ref 81.4–97.8)
MONOCYTES # BLD AUTO: 0.24 K/UL (ref 0–0.85)
MONOCYTES NFR BLD AUTO: 2.5 % (ref 0–13.4)
MORPHOLOGY BLD-IMP: NORMAL
NEUTROPHILS # BLD AUTO: 9.03 K/UL (ref 2–7.15)
NEUTROPHILS NFR BLD: 94.1 % (ref 44–72)
NRBC # BLD AUTO: 0 K/UL
NRBC BLD-RTO: 0 /100 WBC
PLATELET # BLD AUTO: 279 K/UL (ref 164–446)
PLATELET BLD QL SMEAR: NORMAL
PMV BLD AUTO: 11.2 FL (ref 9–12.9)
POTASSIUM SERPL-SCNC: 4 MMOL/L (ref 3.6–5.5)
PROTHROMBIN TIME: 35 SEC (ref 12–14.6)
RBC # BLD AUTO: 4.42 M/UL (ref 4.2–5.4)
RBC BLD AUTO: NORMAL
SODIUM SERPL-SCNC: 137 MMOL/L (ref 135–145)
WBC # BLD AUTO: 9.6 K/UL (ref 4.8–10.8)

## 2021-04-02 PROCEDURE — 700102 HCHG RX REV CODE 250 W/ 637 OVERRIDE(OP): Performed by: NURSE PRACTITIONER

## 2021-04-02 PROCEDURE — A9270 NON-COVERED ITEM OR SERVICE: HCPCS | Performed by: NURSE PRACTITIONER

## 2021-04-02 PROCEDURE — 700111 HCHG RX REV CODE 636 W/ 250 OVERRIDE (IP): Performed by: STUDENT IN AN ORGANIZED HEALTH CARE EDUCATION/TRAINING PROGRAM

## 2021-04-02 PROCEDURE — 99232 SBSQ HOSP IP/OBS MODERATE 35: CPT | Performed by: STUDENT IN AN ORGANIZED HEALTH CARE EDUCATION/TRAINING PROGRAM

## 2021-04-02 PROCEDURE — 94669 MECHANICAL CHEST WALL OSCILL: CPT

## 2021-04-02 PROCEDURE — 97161 PT EVAL LOW COMPLEX 20 MIN: CPT

## 2021-04-02 PROCEDURE — 99406 BEHAV CHNG SMOKING 3-10 MIN: CPT

## 2021-04-02 PROCEDURE — A9270 NON-COVERED ITEM OR SERVICE: HCPCS | Performed by: STUDENT IN AN ORGANIZED HEALTH CARE EDUCATION/TRAINING PROGRAM

## 2021-04-02 PROCEDURE — 700102 HCHG RX REV CODE 250 W/ 637 OVERRIDE(OP): Performed by: STUDENT IN AN ORGANIZED HEALTH CARE EDUCATION/TRAINING PROGRAM

## 2021-04-02 PROCEDURE — 85610 PROTHROMBIN TIME: CPT

## 2021-04-02 PROCEDURE — 94760 N-INVAS EAR/PLS OXIMETRY 1: CPT

## 2021-04-02 PROCEDURE — 94640 AIRWAY INHALATION TREATMENT: CPT

## 2021-04-02 PROCEDURE — 85007 BL SMEAR W/DIFF WBC COUNT: CPT

## 2021-04-02 PROCEDURE — 770020 HCHG ROOM/CARE - TELE (206)

## 2021-04-02 PROCEDURE — 85027 COMPLETE CBC AUTOMATED: CPT

## 2021-04-02 PROCEDURE — 700111 HCHG RX REV CODE 636 W/ 250 OVERRIDE (IP): Performed by: NURSE PRACTITIONER

## 2021-04-02 PROCEDURE — 80048 BASIC METABOLIC PNL TOTAL CA: CPT

## 2021-04-02 PROCEDURE — 36415 COLL VENOUS BLD VENIPUNCTURE: CPT

## 2021-04-02 PROCEDURE — 700101 HCHG RX REV CODE 250: Performed by: STUDENT IN AN ORGANIZED HEALTH CARE EDUCATION/TRAINING PROGRAM

## 2021-04-02 RX ORDER — WARFARIN SODIUM 3 MG/1
3 TABLET ORAL
Status: COMPLETED | OUTPATIENT
Start: 2021-04-02 | End: 2021-04-02

## 2021-04-02 RX ORDER — IPRATROPIUM BROMIDE AND ALBUTEROL SULFATE 2.5; .5 MG/3ML; MG/3ML
3 SOLUTION RESPIRATORY (INHALATION)
Status: DISCONTINUED | OUTPATIENT
Start: 2021-04-02 | End: 2021-04-03 | Stop reason: HOSPADM

## 2021-04-02 RX ADMIN — DOXYCYCLINE 100 MG: 100 TABLET, FILM COATED ORAL at 04:31

## 2021-04-02 RX ADMIN — MONTELUKAST 10 MG: 10 TABLET, FILM COATED ORAL at 21:11

## 2021-04-02 RX ADMIN — IPRATROPIUM BROMIDE AND ALBUTEROL SULFATE 3 ML: .5; 2.5 SOLUTION RESPIRATORY (INHALATION) at 02:15

## 2021-04-02 RX ADMIN — IPRATROPIUM BROMIDE AND ALBUTEROL SULFATE 3 ML: .5; 2.5 SOLUTION RESPIRATORY (INHALATION) at 22:03

## 2021-04-02 RX ADMIN — LITHIUM CARBONATE 300 MG: 300 TABLET, FILM COATED, EXTENDED RELEASE ORAL at 04:31

## 2021-04-02 RX ADMIN — NYSTATIN: 100000 POWDER TOPICAL at 04:31

## 2021-04-02 RX ADMIN — IPRATROPIUM BROMIDE AND ALBUTEROL SULFATE 3 ML: .5; 2.5 SOLUTION RESPIRATORY (INHALATION) at 08:16

## 2021-04-02 RX ADMIN — FUROSEMIDE 20 MG: 10 INJECTION, SOLUTION INTRAMUSCULAR; INTRAVENOUS at 16:50

## 2021-04-02 RX ADMIN — IPRATROPIUM BROMIDE AND ALBUTEROL SULFATE 3 ML: .5; 2.5 SOLUTION RESPIRATORY (INHALATION) at 12:14

## 2021-04-02 RX ADMIN — LITHIUM CARBONATE 600 MG: 300 TABLET, FILM COATED, EXTENDED RELEASE ORAL at 16:50

## 2021-04-02 RX ADMIN — WARFARIN SODIUM 3 MG: 3 TABLET ORAL at 18:24

## 2021-04-02 RX ADMIN — ACETAMINOPHEN 650 MG: 325 TABLET, FILM COATED ORAL at 16:55

## 2021-04-02 RX ADMIN — ARIPIPRAZOLE 30 MG: 15 TABLET ORAL at 04:31

## 2021-04-02 RX ADMIN — NYSTATIN: 100000 POWDER TOPICAL at 16:50

## 2021-04-02 RX ADMIN — METHYLPREDNISOLONE SODIUM SUCCINATE 40 MG: 40 INJECTION, POWDER, FOR SOLUTION INTRAMUSCULAR; INTRAVENOUS at 21:10

## 2021-04-02 RX ADMIN — METHYLPREDNISOLONE SODIUM SUCCINATE 40 MG: 40 INJECTION, POWDER, FOR SOLUTION INTRAMUSCULAR; INTRAVENOUS at 04:37

## 2021-04-02 RX ADMIN — FUROSEMIDE 20 MG: 10 INJECTION, SOLUTION INTRAMUSCULAR; INTRAVENOUS at 04:31

## 2021-04-02 RX ADMIN — METHYLPREDNISOLONE SODIUM SUCCINATE 40 MG: 40 INJECTION, POWDER, FOR SOLUTION INTRAMUSCULAR; INTRAVENOUS at 14:16

## 2021-04-02 RX ADMIN — DOXYCYCLINE 100 MG: 100 TABLET, FILM COATED ORAL at 16:50

## 2021-04-02 ASSESSMENT — COGNITIVE AND FUNCTIONAL STATUS - GENERAL
MOVING TO AND FROM BED TO CHAIR: UNABLE
HELP NEEDED FOR BATHING: A LITTLE
DRESSING REGULAR LOWER BODY CLOTHING: A LOT
SUGGESTED CMS G CODE MODIFIER DAILY ACTIVITY: CJ
TURNING FROM BACK TO SIDE WHILE IN FLAT BAD: A LITTLE
MOBILITY SCORE: 12
SUGGESTED CMS G CODE MODIFIER MOBILITY: CL
WALKING IN HOSPITAL ROOM: A LOT
MOVING FROM LYING ON BACK TO SITTING ON SIDE OF FLAT BED: A LOT
CLIMB 3 TO 5 STEPS WITH RAILING: A LITTLE
MOBILITY SCORE: 23
SUGGESTED CMS G CODE MODIFIER MOBILITY: CI
DAILY ACTIVITIY SCORE: 20
STANDING UP FROM CHAIR USING ARMS: A LOT
TOILETING: A LITTLE
CLIMB 3 TO 5 STEPS WITH RAILING: A LOT

## 2021-04-02 ASSESSMENT — PAIN DESCRIPTION - PAIN TYPE
TYPE: ACUTE PAIN

## 2021-04-02 ASSESSMENT — ENCOUNTER SYMPTOMS
NEUROLOGICAL NEGATIVE: 1
MUSCULOSKELETAL NEGATIVE: 1
EYES NEGATIVE: 1
GASTROINTESTINAL NEGATIVE: 1
PSYCHIATRIC NEGATIVE: 1
CARDIOVASCULAR NEGATIVE: 1
CONSTITUTIONAL NEGATIVE: 1
RESPIRATORY NEGATIVE: 1

## 2021-04-02 ASSESSMENT — FIBROSIS 4 INDEX: FIB4 SCORE: 0.49

## 2021-04-02 ASSESSMENT — GAIT ASSESSMENTS
ASSISTIVE DEVICE: FRONT WHEEL WALKER
DISTANCE (FEET): 150
GAIT LEVEL OF ASSIST: SUPERVISED

## 2021-04-02 NOTE — PROGRESS NOTES
2 RN Skin Check    2 RN skin check complete with Cat RN  Devices in place: tele monitor, PIV, tele monitor.  Skin assessed under devices: yes.  Confirmed pressure ulcers found on: n/a.  New potential pressure ulcers noted on n/a. Wound consult placed N/A.  The following interventions in place Pillows, Lotion and Barrier cream, Q2 turns.    Temo ears pink and blanching   Redness/rash under pannus and groin -- interdry and nystatin powder in place  Elbows pink and blanching   Sacrum pink and blanching, Q2 turns in place   Temo heels dry, but pink and blanching   All toes dry/calloused/flaky/dusky/fragile

## 2021-04-02 NOTE — THERAPY
Physical Therapy   Initial Evaluation     Patient Name: Lorene Haro  Age:  45 y.o., Sex:  female  Medical Record #: [id number]  Today's Date: 4/2/2021          Assessment  Patient is 45 y.o. female w/ recent hospital admit, discharged 8 days prior to current admit.  She is admitted w/ c/o SOB, uses 2 liters o2 at home.  She lives in a second floor apartment w/ elevator access.  She reports being indep w/ dressing, bathing and mobility.  She does have caregivers several times/week to assist w/ transportation and housekeeping.  Today, she is rec'd alert, in bed, agreeable to work w/ PT.  She is able to perform bed mobility and transfer w/o assist.  She is able to ambulate 150 ft w/ a fww w/o loss of balance and w/o need of physical assist.  No loss of balance.  Recommend fww for d/c.  No acute PT needs.  Recommend oob/amb prn w/ nsg and fww.  PT for d/c needs.    Plan    Recommend Physical Therapy for Evaluation only    DC Equipment Recommendations: Front-Wheel Walker  Discharge Recommendations: Recommend home health for continued physical therapy services         Objective       04/02/21 0733   Prior Living Situation   Housing / Facility 2 Story Apartment / Condo   Steps Into Home 0   Steps In Home 0   Elevator Yes   Equipment Owned None   Lives with - Patient's Self Care Capacity Alone and Able to Care For Self   Comments caregivers several times per week   Prior Level of Functional Mobility   Bed Mobility Independent   Transfer Status Independent   Ambulation Independent   Assistive Devices Used None   Balance Assessment   Sitting Balance (Static) Fair +   Sitting Balance (Dynamic) Fair +   Standing Balance (Static) Fair +   Standing Balance (Dynamic) Fair +   Weight Shift Sitting Good   Weight Shift Standing Good   Comments w/ fww   Gait Analysis   Gait Level Of Assist Supervised   Assistive Device Front Wheel Walker   Distance (Feet) 150   Bed Mobility    Supine to Sit Supervised   Sit to Supine Minimal Assist    Functional Mobility   Sit to Stand Supervised   Bed, Chair, Wheelchair Transfer Supervised   Anticipated Discharge Equipment and Recommendations   DC Equipment Recommendations Front-Wheel Walker   Discharge Recommendations Recommend home health for continued physical therapy services

## 2021-04-02 NOTE — PROGRESS NOTES
Received report from CDU RN. Pt arrived to unit via hospital bed at 1945. Pt oriented to room, unit, and plan of care. Tele-monitor placed and monitor room notified. All questions answered at this time. Call light within reach; fall precautions in place.

## 2021-04-02 NOTE — PROGRESS NOTES
[facility] Medicine Daily Progress Note    Date of Service  4/2/2021    Chief Complaint  45 y.o. female admitted 3/31/2021 with Dyspnea    Hospital Course  45 y.o. female who presented 3/31/2021 with shortness of breath for the last 2 days.  She is on home oxygen nasal cannula.  She states that her shortness of breath has been worsening, causing her to come into the ED for evaluation.  She states that she has been complaining of abdominal rash and mild abd pain in her umbilical area.  Denies fever chills cough nausea vomiting.     Patient recently discharged from [facility] 8 days ago.  She presented and was admitted for COPD exacerbation. She received steroids, azithromycin, lasix PO, Incentive spirometry and breathing treatment, she was discharged for prednisone 40 mg for 4 days.  She was also found to have new onset A. fib.  Systolic ejection fraction 60%.  She was started on Lovenox and then bridged to Coumadin, and was recommended skilled nursing facility however refused, and was discharged home shortly after.    Interval Problem Update  Patient examined at bedside this morning  In no acute distress  No overnight complaints  O2 Sat: 95% on 2L  Discharge Home on Oxygen 2L NC to maintain O2 Saturation between 88%-92% in lieu of her COPD.     Consultants/Specialty  None    Code Status  Full Code    Disposition  PT/OT-Home with Home Health Aide and Front Wheel Walker    Review of Systems  Review of Systems   Constitutional: Negative.    HENT: Negative.    Eyes: Negative.    Respiratory: Negative.    Cardiovascular: Negative.    Gastrointestinal: Negative.    Genitourinary: Negative.    Musculoskeletal: Negative.    Skin: Negative.    Neurological: Negative.    Endo/Heme/Allergies: Negative.    Psychiatric/Behavioral: Negative.         Physical Exam  Temp:  [36.1 °C (97 °F)-36.6 °C (97.8 °F)] 36.3 °C (97.3 °F)  Pulse:  [60-78] 65  Resp:  [18-20] 20  BP: (114-134)/(60-70) 127/68  SpO2:  [92 %-98 %] 95  %    Physical Exam  Vitals reviewed.   HENT:      Head: Normocephalic and atraumatic.      Right Ear: External ear normal.      Left Ear: External ear normal.      Nose: Nose normal.      Mouth/Throat:      Mouth: Mucous membranes are moist.   Eyes:      Pupils: Pupils are equal, round, and reactive to light.   Cardiovascular:      Rate and Rhythm: Normal rate and regular rhythm.      Pulses: Normal pulses.      Heart sounds: Normal heart sounds.   Pulmonary:      Effort: Pulmonary effort is normal.      Breath sounds: Normal breath sounds. No stridor.   Abdominal:      General: Abdomen is flat.   Musculoskeletal:         General: Normal range of motion.      Cervical back: Neck supple.   Skin:     General: Skin is warm.      Capillary Refill: Capillary refill takes less than 2 seconds.   Neurological:      General: No focal deficit present.      Mental Status: She is alert.   Psychiatric:         Mood and Affect: Mood normal.         Fluids    Intake/Output Summary (Last 24 hours) at 4/2/2021 0941  Last data filed at 4/2/2021 0357  Gross per 24 hour   Intake 240 ml   Output 2100 ml   Net -1860 ml       Laboratory  Recent Labs     03/31/21 2220 04/02/21  0131   WBC 15.0* 9.6   RBC 4.53 4.42   HEMOGLOBIN 12.6 12.1   HEMATOCRIT 41.9 40.7   MCV 92.5 92.1   MCH 27.8 27.4   MCHC 30.1* 29.7*   RDW 54.6* 53.1*   PLATELETCT 273 279   MPV 11.0 11.2     Recent Labs     03/31/21 2220 04/02/21  0131   SODIUM 140 137   POTASSIUM 3.8 4.0   CHLORIDE 102 100   CO2 30 31   GLUCOSE 113* 173*   BUN 9 13   CREATININE 0.82 0.91   CALCIUM 8.9 9.5     Recent Labs     03/31/21 2220 04/02/21  0131   APTT 62.0*  --    INR 2.52* 3.35*               Imaging  DX-CHEST-PORTABLE (1 VIEW)   Final Result         1.  Hazy left lung base opacity could represent infiltrate.           Assessment/Plan  * Acute exacerbation of chronic obstructive pulmonary disease (COPD) (HCC)- (present on admission)  Assessment & Plan  Admit patient to medical  floor  DuoNebs every 4 hours for 3 doses, can reassess frequency after  Loaded with solumedrol 125 mg x 1 in ED, continue solumedrol 40 mg q6h  Recently completed course of azithromycin.    Left-sided opacity on chest x-ray.  Started doxycycline for concern for pneumonia.  Continue montelukast 10 mg p.o. daily  Continue Lasix 20 mg p.o. daily  Oxygen as needed, keep O2 > 90%        A-fib (HCC)- (present on admission)  Assessment & Plan  INR therapeutic 2.4  Continue coumadin 6 mg po qhs     Yeast infection of the skin  Assessment & Plan  large erythematous rash to bilateral abdominal skin folds and groin. Nystatin powder.    Anxiety  Assessment & Plan  Likely related to albuterol treatments  Atarax    Bilateral lower extremity edema  Assessment & Plan  -Bilateral lower extremity 1+ edema, rash.  Likely venous insufficiency.  History of cellulitis    Bilateral leg cramps  Assessment & Plan  Magnesium level pending    Tremor of both hands  Assessment & Plan  Patient states this is her baseline    Debility- (present on admission)  Assessment & Plan  Patient refused SNF during last admission, is more amenable to placement this admission.  Home health is not an option.  Patient lives alone    Hx of  Bipolar affective disorder, and schizophrenia- (present on admission)  Assessment & Plan  Continue Abilify 30 mg p.o. daily  Continue lithium 300 mg p.o. a.m. and 600 mg every night         VTE prophylaxis: Warfarin

## 2021-04-02 NOTE — DISCHARGE PLANNING
Anticipated Discharge Disposition: Home with HH    Action: Spoke to pt at bedside, pt confirmed all information on facesheet. Pt states she lives alone, but, has caregiver assistance a few times per week to assist with meals and cooking. Pt uses 2 liters nasal cannula at baseline. Pt gave verbal choice for [facility] for HH and [facility] for FWW. JC Cuellar notified to order FWW through [employer].    Barriers to Discharge: FWW delivery, HH acceptance    Plan: DC to home when medically cleared, FWW delivered. Due to pts insurance of [facility], pt may not get HH acceptance, this however will not be a barrier to her discharge due to pt having assistance with ADL's from outside sources a few times per week.

## 2021-04-02 NOTE — RESPIRATORY CARE
"  COPD EDUCATION by COPD CLINICAL EDUCATOR  4/2/2021  at  10:51 AM by Dayami Nielson, RRT     Patient interviewed by COPD education team.   Short intervention has been conducted.  A comprehensive packet including information about COPD, treatments, and smoking cessation given.    COPD Screen  COPD Risk Screening  Do you have a history of COPD?: Yes  Do you have a Pulmonologist?: No  COPD Population Screener  During the past 4 weeks, how much did you feel short of breath?: Most  or all of the time  Do you ever cough up any mucus or phlegm?: Yes, every day  In the past 12 months, you do less than you used to because of your breathing problems: Agree  Have you smoked at least 100 cigarettes in your entire life?: Yes  How old are you?: 35-49  COPD Screening Score: 7  COPD Coordinator Recommended: Yes    COPD Assessment  COPD Clinical Specialists ONLY  COPD Education Initiated: Yes--Short Intervention  Pt unable to do remote monitoring due to phone not compatible  Pulmonary Follow Up Appointment: 04/30/21  Appt Time: 1400  Pulmonologist Name: Jessica Whitfield  Referrals Initiated: Initiated  Pulmonary Rehab: Yes  Smoking Cessation: Yes  $ Smoking Cessation 3-10 Minutes: Symptomatic  Home Health Care: Yes  REMSA Community Outreach: Yes  Is this a COPD exacerbation patient?: Yes     MY COPD ACTION PLAN     It is recommended that patients and physicians /healthcare providers complete this action plan together. This plan should be discussed at each physician visit and updated as needed.    The green, yellow and red zones show groups of symptoms of COPD. This list of symptoms is not comprehensive, and you may experience other symptoms. In the \"Actions\" column, your healthcare provider has recommended actions for you to take based on your symptoms.    Patient Name: Lorene Haro   YOB: 1975   Last Updated on:     Green Zone:  I am doing well today Actions   •  Usual activitiy and exercise level •  Take " "daily medications   •  Usual amounts of cough and phlegm/mucus •  Use oxygen as prescribed   •  Sleep well at night •  Continue regular exercise/diet plan   •  Appetite is good •  At all times avoid cigarette smoke, inhaled irritants     Daily Medications (these medications are taken every day):   Tiotropium Bromide Monohydrate (Spiriva) 2 Puffs Once daily        Yellow Zone:  I am having a bad day or a COPD flare Actions   •  More breathless than usual •  Continue daily medications   •  I have less energy for my daily activities •  Use quick relief inhaler as ordered   •  Increased or thicker phlegm/mucus •  Use oxygen as prescribed   •  Using quick relief inhaler/nebulizer more often •  Get plenty of rest   •  Swelling of ankles more than usual •  Use pursed lip breathing   •  More coughing than usual •  At all times avoid cigarette smoke, inhaled irritants   •  I feel like I have a \"chest cold\"   •  Poor sleep and my symptoms woke me up   •  My appetite is not good   •  My medicine is not helping    •  Call provider immediately if symptoms don’t improve     Continue daily medications, add rescue medications:   Albuterol/Ipratropium (Combivent, Duoneb)  Albuterol 3mL via nebulizer  2 Puffs Every 4 hours PRN  Every 4 hours PRN       Medications to be used during a flare up, (as Discussed with Provider):           Additional Information:  Use albuterol inhaler with spacer and rinse nebulizer per manufacturers recommendation    Red Zone:  I need urgent medical care Actions   •  Severe shortness of breath even at rest •  Call 911 or seek medical care immediately   •  Not able to do any activity because of breathing    •  Fever or shaking chills    •  Feeling confused or very drowsy     •  Chest pains    •  Coughing up blood              "

## 2021-04-02 NOTE — PROGRESS NOTES
Bedside report received from JC Sanford. Call light and belongings within reach. Bed locked and in lowest position. Alarm and fall precautions in place.

## 2021-04-02 NOTE — CARE PLAN
Problem: Communication  Goal: The ability to communicate needs accurately and effectively will improve  Outcome: PROGRESSING AS EXPECTED     Problem: Safety  Goal: Will remain free from injury  Outcome: PROGRESSING AS EXPECTED  Goal: Will remain free from falls  Outcome: PROGRESSING AS EXPECTED     Problem: Psychosocial Needs:  Goal: Level of anxiety will decrease  Outcome: PROGRESSING AS EXPECTED     Problem: Respiratory:  Goal: Respiratory status will improve  Outcome: PROGRESSING AS EXPECTED

## 2021-04-02 NOTE — FACE TO FACE
Face to Face Supporting Documentation - Home Health    The encounter with this patient was in whole or in part the primary reason for home health admission.    Date of encounter:   Patient:                    MRN:                       YOB: 2023  Lorene Haro  [id number]  1975     Home health to see patient for:  Home health aide    Skilled need for:  Exacerbation of Chronic Disease State COPD    Skilled nursing interventions to include:  Comment: ADL    Homebound status evidenced by:  Need the aid of supportive devices such as crutches, canes, wheelchairs or walkers. Leaving home requires a considerable and taxing effort. There is a normal inability to leave the home.    Community Physician to provide follow up care: SHANKAR Gleason     Optional Interventions? No      I certify the face to face encounter for this home health care referral meets the CMS requirements and the encounter/clinical assessment with the patient was, in whole, or in part, for the medical condition(s) listed above, which is the primary reason for home health care. Based on my clinical findings: the service(s) are medically necessary, support the need for home health care, and the homebound criteria are met.  I certify that this patient has had a face to face encounter by myself.  Bart Stubbs M.D. - NPI: [id number]

## 2021-04-02 NOTE — PROGRESS NOTES
Tried to call report to the new RN for the transfer to 823 Bed 2.  RNs were in the middle of shift change.  Will call back.

## 2021-04-02 NOTE — PROGRESS NOTES
I called report to the new RN for 823 Bed 2.  Pt is being transferred upstairs now with her dinner and all of her belongings.

## 2021-04-02 NOTE — DISCHARGE PLANNING
Received Choice form at 1200  Agency/Facility Name: [facility]   Referral sent per Choice form @ 1200    Received Choice form at 1200  Agency/Facility Name: [facility]   Referral sent per Choice form @ 1200

## 2021-04-02 NOTE — DIETARY
NUTRITION SERVICES: BMI - Pt with BMI >40 (=Body mass index is 42.16 kg/m².), morbid obesity. Weight loss counseling not appropriate in acute care setting. RECOMMEND - Referral to outpatient nutrition services for weight management after D/C.

## 2021-04-03 ENCOUNTER — PATIENT OUTREACH (OUTPATIENT)
Dept: HEALTH INFORMATION MANAGEMENT | Facility: OTHER | Age: 46
End: 2021-04-03

## 2021-04-03 ENCOUNTER — PHARMACY VISIT (OUTPATIENT)
Dept: PHARMACY | Facility: MEDICAL CENTER | Age: 46
End: 2021-04-03
Payer: COMMERCIAL

## 2021-04-03 VITALS
RESPIRATION RATE: 18 BRPM | WEIGHT: 281.75 LBS | BODY MASS INDEX: 53.19 KG/M2 | HEART RATE: 62 BPM | OXYGEN SATURATION: 96 % | TEMPERATURE: 98.6 F | SYSTOLIC BLOOD PRESSURE: 111 MMHG | HEIGHT: 61 IN | DIASTOLIC BLOOD PRESSURE: 67 MMHG

## 2021-04-03 PROBLEM — R60.0 BILATERAL LOWER EXTREMITY EDEMA: Status: RESOLVED | Noted: 2021-04-01 | Resolved: 2021-04-03

## 2021-04-03 PROBLEM — B37.2 YEAST INFECTION OF THE SKIN: Status: RESOLVED | Noted: 2021-04-01 | Resolved: 2021-04-03

## 2021-04-03 PROBLEM — J44.1 ACUTE EXACERBATION OF CHRONIC OBSTRUCTIVE PULMONARY DISEASE (COPD) (HCC): Status: RESOLVED | Noted: 2018-11-13 | Resolved: 2021-04-03

## 2021-04-03 PROBLEM — R25.1 TREMOR OF BOTH HANDS: Status: RESOLVED | Noted: 2021-04-01 | Resolved: 2021-04-03

## 2021-04-03 PROBLEM — R25.2 BILATERAL LEG CRAMPS: Status: RESOLVED | Noted: 2021-04-01 | Resolved: 2021-04-03

## 2021-04-03 LAB
ALBUMIN SERPL BCP-MCNC: 3.8 G/DL (ref 3.2–4.9)
ALBUMIN/GLOB SERPL: 1.3 G/DL
ALP SERPL-CCNC: 77 U/L (ref 30–99)
ALT SERPL-CCNC: 17 U/L (ref 2–50)
ANION GAP SERPL CALC-SCNC: 8 MMOL/L (ref 7–16)
AST SERPL-CCNC: <5 U/L (ref 12–45)
BILIRUB SERPL-MCNC: 0.3 MG/DL (ref 0.1–1.5)
BUN SERPL-MCNC: 23 MG/DL (ref 8–22)
CALCIUM SERPL-MCNC: 9.5 MG/DL (ref 8.5–10.5)
CHLORIDE SERPL-SCNC: 99 MMOL/L (ref 96–112)
CO2 SERPL-SCNC: 29 MMOL/L (ref 20–33)
CREAT SERPL-MCNC: 0.92 MG/DL (ref 0.5–1.4)
ERYTHROCYTE [DISTWIDTH] IN BLOOD BY AUTOMATED COUNT: 52.7 FL (ref 35.9–50)
GLOBULIN SER CALC-MCNC: 3 G/DL (ref 1.9–3.5)
GLUCOSE SERPL-MCNC: 202 MG/DL (ref 65–99)
HCT VFR BLD AUTO: 41.4 % (ref 37–47)
HGB BLD-MCNC: 12.3 G/DL (ref 12–16)
INR PPP: 2.79 (ref 0.87–1.13)
MCH RBC QN AUTO: 27.5 PG (ref 27–33)
MCHC RBC AUTO-ENTMCNC: 29.7 G/DL (ref 33.6–35)
MCV RBC AUTO: 92.6 FL (ref 81.4–97.8)
PLATELET # BLD AUTO: 255 K/UL (ref 164–446)
PMV BLD AUTO: 11.4 FL (ref 9–12.9)
POTASSIUM SERPL-SCNC: 4.4 MMOL/L (ref 3.6–5.5)
PROT SERPL-MCNC: 6.8 G/DL (ref 6–8.2)
PROTHROMBIN TIME: 30.3 SEC (ref 12–14.6)
RBC # BLD AUTO: 4.47 M/UL (ref 4.2–5.4)
SODIUM SERPL-SCNC: 136 MMOL/L (ref 135–145)
WBC # BLD AUTO: 9.6 K/UL (ref 4.8–10.8)

## 2021-04-03 PROCEDURE — 700102 HCHG RX REV CODE 250 W/ 637 OVERRIDE(OP): Performed by: STUDENT IN AN ORGANIZED HEALTH CARE EDUCATION/TRAINING PROGRAM

## 2021-04-03 PROCEDURE — A9270 NON-COVERED ITEM OR SERVICE: HCPCS | Performed by: STUDENT IN AN ORGANIZED HEALTH CARE EDUCATION/TRAINING PROGRAM

## 2021-04-03 PROCEDURE — 94760 N-INVAS EAR/PLS OXIMETRY 1: CPT

## 2021-04-03 PROCEDURE — 94640 AIRWAY INHALATION TREATMENT: CPT

## 2021-04-03 PROCEDURE — 94669 MECHANICAL CHEST WALL OSCILL: CPT

## 2021-04-03 PROCEDURE — 700102 HCHG RX REV CODE 250 W/ 637 OVERRIDE(OP): Performed by: NURSE PRACTITIONER

## 2021-04-03 PROCEDURE — 700111 HCHG RX REV CODE 636 W/ 250 OVERRIDE (IP): Performed by: STUDENT IN AN ORGANIZED HEALTH CARE EDUCATION/TRAINING PROGRAM

## 2021-04-03 PROCEDURE — RXMED WILLOW AMBULATORY MEDICATION CHARGE: Performed by: STUDENT IN AN ORGANIZED HEALTH CARE EDUCATION/TRAINING PROGRAM

## 2021-04-03 PROCEDURE — 85027 COMPLETE CBC AUTOMATED: CPT

## 2021-04-03 PROCEDURE — 85610 PROTHROMBIN TIME: CPT

## 2021-04-03 PROCEDURE — A9270 NON-COVERED ITEM OR SERVICE: HCPCS | Performed by: NURSE PRACTITIONER

## 2021-04-03 PROCEDURE — 80053 COMPREHEN METABOLIC PANEL: CPT

## 2021-04-03 PROCEDURE — 700101 HCHG RX REV CODE 250: Performed by: STUDENT IN AN ORGANIZED HEALTH CARE EDUCATION/TRAINING PROGRAM

## 2021-04-03 PROCEDURE — 97535 SELF CARE MNGMENT TRAINING: CPT

## 2021-04-03 PROCEDURE — 36415 COLL VENOUS BLD VENIPUNCTURE: CPT

## 2021-04-03 PROCEDURE — 700111 HCHG RX REV CODE 636 W/ 250 OVERRIDE (IP): Performed by: NURSE PRACTITIONER

## 2021-04-03 PROCEDURE — 97165 OT EVAL LOW COMPLEX 30 MIN: CPT

## 2021-04-03 PROCEDURE — 99239 HOSP IP/OBS DSCHRG MGMT >30: CPT | Performed by: STUDENT IN AN ORGANIZED HEALTH CARE EDUCATION/TRAINING PROGRAM

## 2021-04-03 RX ORDER — PREDNISONE 50 MG/1
50 TABLET ORAL DAILY
Qty: 5 TABLET | Refills: 0 | Status: SHIPPED | OUTPATIENT
Start: 2021-04-03 | End: 2021-04-08

## 2021-04-03 RX ADMIN — IPRATROPIUM BROMIDE AND ALBUTEROL SULFATE 3 ML: .5; 2.5 SOLUTION RESPIRATORY (INHALATION) at 07:31

## 2021-04-03 RX ADMIN — NYSTATIN: 100000 POWDER TOPICAL at 06:06

## 2021-04-03 RX ADMIN — ARIPIPRAZOLE 30 MG: 15 TABLET ORAL at 06:06

## 2021-04-03 RX ADMIN — METOPROLOL TARTRATE 12.5 MG: 25 TABLET, FILM COATED ORAL at 00:56

## 2021-04-03 RX ADMIN — IPRATROPIUM BROMIDE AND ALBUTEROL SULFATE 3 ML: .5; 2.5 SOLUTION RESPIRATORY (INHALATION) at 11:08

## 2021-04-03 RX ADMIN — DOXYCYCLINE 100 MG: 100 TABLET, FILM COATED ORAL at 06:06

## 2021-04-03 RX ADMIN — FUROSEMIDE 20 MG: 10 INJECTION, SOLUTION INTRAMUSCULAR; INTRAVENOUS at 06:06

## 2021-04-03 RX ADMIN — LITHIUM CARBONATE 300 MG: 300 TABLET, FILM COATED, EXTENDED RELEASE ORAL at 06:06

## 2021-04-03 RX ADMIN — METHYLPREDNISOLONE SODIUM SUCCINATE 40 MG: 40 INJECTION, POWDER, FOR SOLUTION INTRAMUSCULAR; INTRAVENOUS at 06:06

## 2021-04-03 ASSESSMENT — COGNITIVE AND FUNCTIONAL STATUS - GENERAL
HELP NEEDED FOR BATHING: A LITTLE
SUGGESTED CMS G CODE MODIFIER DAILY ACTIVITY: CJ
DAILY ACTIVITIY SCORE: 22
DRESSING REGULAR LOWER BODY CLOTHING: A LITTLE

## 2021-04-03 ASSESSMENT — GAIT ASSESSMENTS: DISTANCE (FEET): 50

## 2021-04-03 ASSESSMENT — ACTIVITIES OF DAILY LIVING (ADL): TOILETING: INDEPENDENT

## 2021-04-03 NOTE — PROGRESS NOTES
Patient discharged home, no complaints of SOB, saturation maintained. Discharge instructions given to the patient, verbalized understanding. Meds to bed delivered prednisone, patient advised to take one tab  daily for 5 days. Follow up discussed with patient verbalized understanding. Discussed with MD, no need for HH setup, patient maybe discharged. Patient's oxygen tank sent with her and FW walker sent with patient. All belongings sent with the patient. Mom to provide transportation. Patient was escorted downstairs via wheelchair.

## 2021-04-03 NOTE — PROGRESS NOTES
12 hour cc    Monitor Summary  SB-SR 57-77  PAC  Burst of Afib up to 180s for about 2 minutes  .16/.08/.36

## 2021-04-03 NOTE — PROGRESS NOTES
Assumed care at 1900, bedside report received from Shay GREENE. Pt. Is SR on the monitor. Initial assessment completed, orders reviewed, call light within reach, bed alarm is in use, and hourly rounding in place. POC addressed with patient, no additional questions at this time.

## 2021-04-03 NOTE — PROGRESS NOTES
Monitor check called on patient. Patient had burst of Afib with RVR up to 180s. Patient had just returned to bed from using the bathroom. Patient was asymptomatic and converted to SR. MD notified and 12.5 of metoprolol BID added.

## 2021-04-03 NOTE — PROGRESS NOTES
Inpatient Anticoagulation Service Note    Date: 4/2/2021    Reason for Anticoagulation: Atrial Fibrillation   Target INR: 2.0 to 3.0  [id number] VASc Score: 1  HAS-BLED Score: 0   Hemoglobin Value: 12.1  Hematocrit Value: 40.7  Lab Platelet Value: 279    INR from last 7 days     Date/Time INR Value    04/02/21 0131  (!) 3.35    03/31/21 2220  (!) 2.52        Dose from last 7 days     Date/Time Dose (mg)    04/02/21 1721  3    04/01/21 0226  6        Average Dose (mg): Confirmed outpatient dose 6 mg daily with last dose 3/31/21 PM prior to arrival  Significant Interactions: Antibiotics, Corticosteroids  Bridge Therapy: No  Reversal Agent Administered: Not Applicable    Comments: Spoke with patient at bedside.  She is new to Coumadin therapy and her outpatient dose is still being established.  She was discharged on 6 mg daily and her INR was therapeutic 3 days after initiation of therapy.  She was instructed to take 3 mg on 3/25 and INR on 3/26/21 was subtherapeutic at 1.4.  She was instructed to take 9 mg once on 3/26/21 and then resume 6 mg daily with a therapeutic INR of 2.4 on 3/29/21 in clinic.  She believes that she has followed these directions and reports taking 6 mg daily the days just prior to coming into the hospital, with her last dose at home in the evening on 3/31/21.  INR on admission was 2.52.  She is noted to be on doxycycline and IV solumedrol and reports that she is getting some of her appetite back.  She recieved 6 mg once yesterday morning < 24 hrs post last home dose.  INR today slightly supratherapeutic.  Unsure if this is due to 2 doses close together ~ 24 hrs prior to INR check, dietary changes with acute illness, addition of DDI, most likely a combination of everything.  H/H stable and patient reports bruising at prior abdomen SQ injection site and at IV site but otherwise no overt s/s bleeding.  Additional education and educational pamphlet given to patient and all questions answered.       Plan:  Coumadin 3 mg x 1 tonight.  INR in AM x 3.  Pharmacy to monitor and adjust as needed.      Education Material Provided?: Yes(4/2/21 verónica)  Pharmacist suggested discharge dosing: Coumadin 3 mg once 4/2/21 and then 6 mg daily with INR 2-3 days post discharge     Nargis Heart, PharmD, BCPS

## 2021-04-03 NOTE — CARE PLAN
Problem: Communication  Goal: The ability to communicate needs accurately and effectively will improve  Outcome: PROGRESSING AS EXPECTED     Problem: Safety  Goal: Will remain free from injury  Outcome: PROGRESSING AS EXPECTED     Problem: Pain Management  Goal: Pain level will decrease to patient's comfort goal  Outcome: PROGRESSING AS EXPECTED     Problem: Venous Thromboembolism (VTW)/Deep Vein Thrombosis (DVT) Prevention:  Goal: Patient will participate in Venous Thrombosis (VTE)/Deep Vein Thrombosis (DVT)Prevention Measures  Outcome: PROGRESSING AS EXPECTED     Problem: Psychosocial Needs:  Goal: Level of anxiety will decrease  Outcome: PROGRESSING AS EXPECTED

## 2021-04-03 NOTE — DISCHARGE PLANNING
Meds-to-Beds: Discharge prescription orders listed below delivered to patient's bedside. RN notified. Written information regarding the dispensed prescriptions was provided to the patient including the phone number of the pharmacy to call for any questions.        Lorene Haro   Home Medication Instructions TRACIE:[id number]    Printed on:04/03/21 1257   Medication Information                      predniSONE (DELTASONE) 50 MG Tab  Take 1 tablet by mouth every day for 5 days.                 Barb Rivas, PharmD

## 2021-04-03 NOTE — DISCHARGE SUMMARY
Discharge Summary    CHIEF COMPLAINT ON ADMISSION  Chief Complaint   Patient presents with   • Shortness of Breath     x 2 days, Hx CHF, asthma       Reason for Admission  EMS     Admission Date  3/31/2021    CODE STATUS  Full Code    HPI & HOSPITAL COURSE  45 y.o. female who presented 3/31/2021 with shortness of breath for the last 2 days.  She is on home oxygen nasal cannula.  She states that her shortness of breath has been worsening, causing her to come into the ED for evaluation.  She states that she has been complaining of abdominal rash and mild abd pain in her umbilical area.  Denies fever chills cough nausea vomiting.     Patient recently discharged from [facility] 8 days ago.  She presented and was admitted for COPD exacerbation. She received steroids, azithromycin, lasix PO, Incentive spirometry and breathing treatment, she was discharged for prednisone 40 mg for 4 days.  She was also found to have new onset A. fib.  Systolic ejection fraction 60%.  She was started on Lovenox and then bridged to Coumadin, and was recommended skilled nursing facility however refused, and was discharged home shortly after.    Patient examined at bedside this morning  In no acute distress  No overnight complaints  O2 Sat: 97% on 2L  Discharge Home on Oxygen 2L NC to maintain O2 Saturation between 88%-92% in lieu of her COPD and 5 day course of Prednisone 50mg Daily PO.         Therefore, she is discharged in good and stable condition to home with close outpatient follow-up.    The patient met 2-midnight criteria for an inpatient stay at the time of discharge.    Discharge Date  4/3/21    FOLLOW UP ITEMS POST DISCHARGE  Follow up with PCP in 1 week     DISCHARGE DIAGNOSES  Principal Problem (Resolved):    Acute exacerbation of chronic obstructive pulmonary disease (COPD) (HCC) POA: Yes  Active Problems:    A-fib (HCC) POA: Yes    Anxiety POA: Unknown    Hx of  Bipolar affective disorder, and schizophrenia POA: Yes     Debility POA: Yes  Resolved Problems:    Yeast infection of the skin POA: Unknown    Bilateral leg cramps POA: Unknown    Bilateral lower extremity edema POA: Unknown    Tremor of both hands POA: Unknown      FOLLOW UP  Future Appointments   Date Time Provider Department Center   4/30/2021  2:00 PM Jessica Whitfield P.A.-C. [facility] None   7/14/2021  2:00 PM Oz Borrego M.D. [facility] None     No follow-up provider specified.    MEDICATIONS ON DISCHARGE     Medication List      START taking these medications      Instructions   predniSONE 50 MG Tabs  Commonly known as: DELTASONE   Take 1 tablet by mouth every day for 5 days.  Dose: 50 mg        CHANGE how you take these medications      Instructions   nystatin powder  What changed:   · how much to take  · how to take this  · when to take this  · reasons to take this  · additional instructions  Commonly known as: MYCOSTATIN   Apply underneath breasts and on inner thigh area twice a day for fungal infection        CONTINUE taking these medications      Instructions   Abilify 30 MG tablet  Generic drug: aripiprazole   Take 30 mg by mouth every morning.  Dose: 30 mg     atorvastatin 20 MG Tabs  Commonly known as: LIPITOR   Take 1 Tab by mouth every day.  Dose: 20 mg     fluticasone 50 MCG/ACT nasal spray  Commonly known as: FLONASE   Administer 1 Spray into affected nostril(S) 2 times a day.  Dose: 1 Spray     furosemide 20 MG Tabs  Commonly known as: LASIX   Take 1 Tab by mouth every day.  Dose: 20 mg     lithium  MG Tbcr tablet  Commonly known as: Lithobid   Take 300-600 mg by mouth 2 Times a Day. 1 caps = 300 mg every morning  2 caps = 600 mg every evening  Dose: 300-600 mg     montelukast 10 MG Tabs  Commonly known as: SINGULAIR   Take 1 Tab by mouth every bedtime.  Dose: 10 mg     Spiriva HandiHaler 18 MCG Caps  Generic drug: tiotropium   Inhale 1 Cap by mouth every day.  Dose: 18 mcg     Ventolin  (90 Base) MCG/ACT Aers inhalation aerosol  Generic drug:  albuterol   Inhale 2 Puffs every 6 hours as needed for Shortness of Breath.  Dose: 2 Puff     warfarin 6 MG Tabs  Commonly known as: COUMADIN   Doctor's comments: Adjust coumadin dose per INR. INR target 2-3  Take 1 tablet by mouth every day for 30 days.  Dose: 6 mg        STOP taking these medications    B-1 100 MG Tabs     ipratropium-albuterol 0.5-2.5 (3) MG/3ML nebulizer solution  Commonly known as: DUONEB     Stool Softener Plus Laxative 8.6-50 MG Tabs  Generic drug: senna-docusate            Allergies  Allergies   Allergen Reactions   • Amoxicillin Rash and Itching     Tolerates cephalosporins, pt reports that she gets a rash all over her body and gets itchy   • Penicillin G Rash and Itching     pt reports that she gets a rash all over her body and gets itchy       DIET  Orders Placed This Encounter   Procedures   • Diet Order Diet: Cardiac     Standing Status:   Standing     Number of Occurrences:   1     Order Specific Question:   Diet:     Answer:   Cardiac [6]       ACTIVITY  As tolerated.  Weight bearing as tolerated    CONSULTATIONS  None    PROCEDURES  None    LABORATORY  Lab Results   Component Value Date    SODIUM 136 04/03/2021    POTASSIUM 4.4 04/03/2021    CHLORIDE 99 04/03/2021    CO2 29 04/03/2021    GLUCOSE 202 (H) 04/03/2021    BUN 23 (H) 04/03/2021    CREATININE 0.92 04/03/2021    CREATININE 0.9 09/11/2008        Lab Results   Component Value Date    WBC 9.6 04/03/2021    HEMOGLOBIN 12.3 04/03/2021    HEMATOCRIT 41.4 04/03/2021    PLATELETCT 255 04/03/2021        Total time of the discharge process exceeds 35 minutes.

## 2021-04-03 NOTE — DISCHARGE INSTRUCTIONS
Discharge Instructions    Discharged to home by car with relative. Discharged via wheelchair, hospital escort: Yes.  Special equipment needed: Oxygen and Walker    Be sure to schedule a follow-up appointment with your primary care doctor or any specialists as instructed.     Discharge Plan:   Diet Plan: Discussed  Activity Level: Discussed  Confirmed Follow up Appointment: Appointment Scheduled  Confirmed Symptoms Management: Discussed  Medication Reconciliation Updated: Yes    I understand that a diet low in cholesterol, fat, and sodium is recommended for good health. Unless I have been given specific instructions below for another diet, I accept this instruction as my diet prescription.   Other diet: Cardiac diet    Special Instructions: Chronic Obstructive Pulmonary Disease (COPD) is a long-term, progressive lung disease that makes it harder to breathe. It includes chronic bronchitis, emphysema, and refractory (non-reversible) asthma. With COPD, the airways in your lungs become inflamed and thicken, and the tissue where oxygen is exchanged is destroyed. The flow of air in and out of your lungs decreases. When that happens, less oxygen gets into your body tissues, and it becomes harder to get rid of the waste gas carbon dioxide. As the disease gets worse, shortness of breath makes it harder to remain active. There is no cure for COPD, but it is preventable and treatable.    COPD Patient Discharge Instructions    • Diet  o Follow a low fat, low cholesterol, low salt diet unless instructed otherwise by your Doctor. Read the labels on the back of food products and track your intake of fat, cholesterol and salt.  • No smoking  o Discontinuing smoking will have the biggest impact on preventing progression of disease.  o To participate in [employer]’s Quit Tobacco Program, call 957-4251 or visit [email or web address]  • Oxygen  o If your doctor has order that you wear oxygen at home, it is important to wear it as  ordered.  o Do not smoke, vape, or use e-cigarettes with oxygen on.  o Store in an appropriate location: upright in its art or laid flat down, away from open flames and stoves.   o Do not use oil-based creams and moisturizers (ie: petroleum products, oil-based lip moisturizers) or aerosol sprays (ie: hair sprays or deodorants) when using your oxygen equipment.  o Be careful with tubing placement to prevent yourself and others from tripping.  • Medications  o Refer to your personalized Action Plan to manage your symptoms.  • Warning signs of an exacerbation  o Breathing fast and shallow, worsening shortness of breath (like you just finished exercising)  o Chest tightness  o Increases in sputum production  o Changes in sputum color  o Lower oxygen levels than baseline  • When to call your doctor  o If the warning signs of an exacerbation do not improve  o Refer to your personalized Action Plan   • Pulmonary Rehab  o Your doctor has ordered you a referral to Pulmonary Rehab. Call [phone] to schedule an appointment  • Attend your follow-up appointment with your PCP and/or Pulmonologist  • Remote Monitoring: At the direction of the remote monitoring on-call provider, you may increase your oxygen by 2 liters above your baseline.     See the educational handout provided by your COPD Navigator for more information. This also explains more about COPD, symptoms of an exacerbation, and some of the tests that you have undergone.    · Is patient discharged on Warfarin / Coumadin?   Yes    You are receiving the drug warfarin. Please understand the importance of monitoring warfarin with scheduled PT/INR blood draws.  Follow-up with a call to your personal Doctor's office in 3 days to schedule a PT/INR. .    IMPORTANT: HOW TO USE THIS INFORMATION:  This is a summary and does NOT have all possible information about this product. This information does not assure that this product is safe, effective, or appropriate for you. This  "information is not individual medical advice and does not substitute for the advice of your health care professional. Always ask your health care professional for complete information about this product and your specific health needs.      WARFARIN - ORAL (WARF-uh-rin)      COMMON BRAND NAME(S): Coumadin      WARNING:  Warfarin can cause very serious (possibly fatal) bleeding. This is more likely to occur when you first start taking this medication or if you take too much warfarin. To decrease your risk for bleeding, your doctor or other health care provider will monitor you closely and check your lab results (INR test) to make sure you are not taking too much warfarin. Keep all medical and laboratory appointments. Tell your doctor right away if you notice any signs of serious bleeding. See also Side Effects section.      USES:  This medication is used to treat blood clots (such as in deep vein thrombosis-DVT or pulmonary embolus-PE) and/or to prevent new clots from forming in your body. Preventing harmful blood clots helps to reduce the risk of a stroke or heart attack. Conditions that increase your risk of developing blood clots include a certain type of irregular heart rhythm (atrial fibrillation), heart valve replacement, recent heart attack, and certain surgeries (such as hip/knee replacement). Warfarin is commonly called a \"blood thinner,\" but the more correct term is \"anticoagulant.\" It helps to keep blood flowing smoothly in your body by decreasing the amount of certain substances (clotting proteins) in your blood.      HOW TO USE:  Read the Medication Guide provided by your pharmacist before you start taking warfarin and each time you get a refill. If you have any questions, ask your doctor or pharmacist. Take this medication by mouth with or without food as directed by your doctor or other health care professional, usually once a day. It is very important to take it exactly as directed. Do not increase the " dose, take it more frequently, or stop using it unless directed by your doctor. Dosage is based on your medical condition, laboratory tests (such as INR), and response to treatment. Your doctor or other health care provider will monitor you closely while you are taking this medication to determine the right dose for you. Use this medication regularly to get the most benefit from it. To help you remember, take it at the same time each day. It is important to eat a balanced, consistent diet while taking warfarin. Some foods can affect how warfarin works in your body and may affect your treatment and dose. Avoid sudden large increases or decreases in your intake of foods high in vitamin K (such as broccoli, cauliflower, cabbage, brussels sprouts, kale, spinach, and other green leafy vegetables, liver, green tea, certain vitamin supplements). If you are trying to lose weight, check with your doctor before you try to go on a diet. Cranberry products may also affect how your warfarin works. Limit the amount of cranberry juice (16 ounces/480 milliliters a day) or other cranberry products you may drink or eat.      SIDE EFFECTS:  Nausea, loss of appetite, or stomach/abdominal pain may occur. If any of these effects persist or worsen, tell your doctor or pharmacist promptly. Remember that your doctor has prescribed this medication because he or she has judged that the benefit to you is greater than the risk of side effects. Many people using this medication do not have serious side effects. This medication can cause serious bleeding if it affects your blood clotting proteins too much (shown by unusually high INR lab results). Even if your doctor stops your medication, this risk of bleeding can continue for up to a week. Tell your doctor right away if you have any signs of serious bleeding, including: unusual pain/swelling/discomfort, unusual/easy bruising, prolonged bleeding from cuts or gums, persistent/frequent nosebleeds,  unusually heavy/prolonged menstrual flow, pink/dark urine, coughing up blood, vomit that is bloody or looks like coffee grounds, severe headache, dizziness/fainting, unusual or persistent tiredness/weakness, bloody/black/tarry stools, chest pain, shortness of breath, difficulty swallowing. Tell your doctor right away if any of these unlikely but serious side effects occur: persistent nausea/vomiting, severe stomach/abdominal pain, yellowing eyes/skin. This drug rarely has caused very serious (possibly fatal) problems if its effects lead to small blood clots (usually at the beginning of treatment). This can lead to severe skin/tissue damage that may require surgery or amputation if left untreated. Patients with certain blood conditions (protein C or S deficiency) may be at greater risk. Get medical help right away if any of these rare but serious side effects occur: painful/red/purplish patches on the skin (such as on the toe, breast, abdomen), change in the amount of urine, vision changes, confusion, slurred speech, weakness on one side of the body. A very serious allergic reaction to this drug is rare. However, get medical help right away if you notice any symptoms of a serious allergic reaction, including: rash, itching/swelling (especially of the face/tongue/throat), severe dizziness, trouble breathing. This is not a complete list of possible side effects. If you notice other effects not listed above, contact your doctor or pharmacist. In the [place] - Call your doctor for medical advice about side effects. You may report side effects to FDA at 6-999-WIA-0107. In [place] - Call your doctor for medical advice about side effects. You may report side effects to [facility] [place] at [phone].      PRECAUTIONS:  Before taking warfarin, tell your doctor or pharmacist if you are allergic to it; or if you have any other allergies. This product may contain inactive ingredients, which can cause allergic reactions or other  problems. Talk to your pharmacist for more details. Before using this medication, tell your doctor or pharmacist your medical history, especially of: blood disorders (such as anemia, hemophilia), bleeding problems (such as bleeding of the stomach/intestines, bleeding in the brain), blood vessel disorders (such as aneurysms), recent major injury/surgery, liver disease, alcohol use, mental/mood disorders (including memory problems), frequent falls/injuries. It is important that all your doctors and dentists know that you take warfarin. Before having surgery or any medical/dental procedures, tell your doctor or dentist that you are taking this medication and about all the products you use (including prescription drugs, nonprescription drugs, and herbal products). Avoid getting injections into the muscles. If you must have an injection into a muscle (for example, a flu shot), it should be given in the arm. This way, it will be easier to check for bleeding and/or apply pressure bandages. This medication may cause stomach bleeding. Daily use of alcohol while using this medicine will increase your risk for stomach bleeding and may also affect how this medication works. Limit or avoid alcoholic beverages. If you have not been eating well, if you have an illness or infection that causes fever, vomiting, or diarrhea for more than 2 days, or if you start using any antibiotic medications, contact your doctor or pharmacist immediately because these conditions can affect how warfarin works. This medication can cause heavy bleeding. To lower the chance of getting cut, bruised, or injured, use great caution with sharp objects like safety razors and nail cutters. Use an electric razor when shaving and a soft toothbrush when brushing your teeth. Avoid activities such as contact sports. If you fall or injure yourself, especially if you hit your head, call your doctor immediately. Your doctor may need to check you. The Food & Drug  "Administration has stated that generic warfarin products are interchangeable. However, consult your doctor or pharmacist before switching warfarin products. Be careful not to take more than one medication that contains warfarin unless specifically directed by the doctor or health care provider who is monitoring your warfarin treatment. Older adults may be at greater risk for bleeding while using this drug. This medication is not recommended for use during pregnancy because of serious (possibly fatal) harm to an unborn baby. Discuss the use of reliable forms of birth control with your doctor. If you become pregnant or think you may be pregnant, tell your doctor immediately. If you are planning pregnancy, discuss a plan for managing your condition with your doctor before you become pregnant. Your doctor may switch the type of medication you use during pregnancy. Very small amounts of this medication may pass into breast milk but is unlikely to harm a nursing infant. Consult your doctor before breast-feeding.      DRUG INTERACTIONS:  Drug interactions may change how your medications work or increase your risk for serious side effects. This document does not contain all possible drug interactions. Keep a list of all the products you use (including prescription/nonprescription drugs and herbal products) and share it with your doctor and pharmacist. Do not start, stop, or change the dosage of any medicines without your doctor's approval. Warfarin interacts with many prescription, nonprescription, vitamin, and herbal products. This includes medications that are applied to the skin or inside the vagina or rectum. The interactions with warfarin usually result in an increase or decrease in the \"blood-thinning\" (anticoagulant) effect. Your doctor or other health care professional should closely monitor you to prevent serious bleeding or clotting problems. While taking warfarin, it is very important to tell your doctor or " pharmacist of any changes in medications, vitamins, or herbal products that you are taking. Some products that may interact with this drug include: capecitabine, imatinib, mifepristone. Aspirin, aspirin-like drugs (salicylates), and nonsteroidal anti-inflammatory drugs (NSAIDs such as ibuprofen, naproxen, celecoxib) may have effects similar to warfarin. These drugs may increase the risk of bleeding problems if taken during treatment with warfarin. Carefully check all prescription/nonprescription product labels (including drugs applied to the skin such as pain-relieving creams) since the products may contain NSAIDs or salicylates. Talk to your doctor about using a different medication (such as acetaminophen) to treat pain/fever. Low-dose aspirin and related drugs (such as clopidogrel, ticlopidine) should be continued if prescribed by your doctor for specific medical reasons such as heart attack or stroke prevention. Consult your doctor or pharmacist for more details. Many herbal products interact with warfarin. Tell your doctor before taking any herbal products, especially bromelains, coenzyme Q10, cranberry, danshen, dong quai, fenugreek, garlic, ginkgo biloba, ginseng, and Zahra's wort, among others. This medication may interfere with a certain laboratory test to measure theophylline levels, possibly causing false test results. Make sure laboratory personnel and all your doctors know you use this drug.      OVERDOSE:  If overdose is suspected, contact a poison control center or emergency room immediately. [place] residents can call the [place] National Poison Hotline at 1-276.671.8525. [place] residents can call a provincial poison control center. Symptoms of overdose may include: bloody/black/tarry stools, pink/dark urine, unusual/prolonged bleeding.      NOTES:  Do not share this medication with others. Laboratory and/or medical tests (such as INR, complete blood count) must be performed periodically to monitor your  progress or check for side effects. Consult your doctor for more details.      MISSED DOSE:  For the best possible benefit, do not miss any doses. If you do miss a dose and remember on the same day, take it as soon as you remember. If you remember on the next day, skip the missed dose and resume your usual dosing schedule. Do not double the dose to catch up because this could increase your risk for bleeding. Keep a record of missed doses to give to your doctor or pharmacist. Contact your doctor or pharmacist if you miss 2 or more doses in a row.      STORAGE:  Store at room temperature away from light and moisture. Do not store in the bathroom. Keep all medications away from children and pets. Do not flush medications down the toilet or pour them into a drain unless instructed to do so. Properly discard this product when it is  or no longer needed. Consult your pharmacist or local waste disposal company for more details about how to safely discard your product.      MEDICAL ALERT:  Your condition and medication can cause complications in a medical emergency. For information about enrolling in MedicAlert, call 1-785.199.9476 ([place]) or 1-606.805.3881 ([place]).      Information last revised 2010 Copyright(c) 2010 [employer]             Depression / Suicide Risk    As you are discharged from this [facility] facility, it is important to learn how to keep safe from harming yourself.    Recognize the warning signs:  · Abrupt changes in personality, positive or negative- including increase in energy   · Giving away possessions  · Change in eating patterns- significant weight changes-  positive or negative  · Change in sleeping patterns- unable to sleep or sleeping all the time   · Unwillingness or inability to communicate  · Depression  · Unusual sadness, discouragement and loneliness  · Talk of wanting to die  · Neglect of personal appearance   · Rebelliousness- reckless behavior  · Withdrawal  "from people/activities they love  · Confusion- inability to concentrate     If you or a loved one observes any of these behaviors or has concerns about self-harm, here's what you can do:  · Talk about it- your feelings and reasons for harming yourself  · Remove any means that you might use to hurt yourself (examples: pills, rope, extension cords, firearm)  · Get professional help from the community (Mental Health, Substance Abuse, psychological counseling)  · Do not be alone:Call your Safe Contact- someone whom you trust who will be there for you.  · Call your local CRISIS HOTLINE 675-3880 or 468-271-2677  · Call your local Children's Mobile Crisis Response Team [place] (997) 436-0968 or www.Revl  · Call the toll free National Suicide Prevention Hotlines   · National Suicide Prevention Lifeline 580-087-SLRN (4481)  · [employer] [facility] 800-SUICIDE (520-7040)    MY COPD ACTION PLAN     It is recommended that patients and physicians /healthcare providers complete this action plan together. This plan should be discussed at each physician visit and updated as needed.    The green, yellow and red zones show groups of symptoms of COPD. This list of symptoms is not comprehensive, and you may experience other symptoms. In the \"Actions\" column, your healthcare provider has recommended actions for you to take based on your symptoms.    Patient Name: Lorene Haro   YOB: 1975   Last Updated on:     Green Zone:  I am doing well today Actions   •  Usual activitiy and exercise level •  Take daily medications   •  Usual amounts of cough and phlegm/mucus •  Use oxygen as prescribed   •  Sleep well at night •  Continue regular exercise/diet plan   •  Appetite is good •  At all times avoid cigarette smoke, inhaled irritants     Daily Medications (these medications are taken every day):   Tiotropium Bromide Monohydrate (Spiriva) 2 Puffs Once daily        Yellow Zone:  I am having a bad day " "or a COPD flare Actions   •  More breathless than usual •  Continue daily medications   •  I have less energy for my daily activities •  Use quick relief inhaler as ordered   •  Increased or thicker phlegm/mucus •  Use oxygen as prescribed   •  Using quick relief inhaler/nebulizer more often •  Get plenty of rest   •  Swelling of ankles more than usual •  Use pursed lip breathing   •  More coughing than usual •  At all times avoid cigarette smoke, inhaled irritants   •  I feel like I have a \"chest cold\"   •  Poor sleep and my symptoms woke me up   •  My appetite is not good   •  My medicine is not helping    •  Call provider immediately if symptoms don’t improve     Continue daily medications, add rescue medications:   Albuterol/Ipratropium (Combivent, Duoneb)  Albuterol 3mL via nebulizer  2 Puffs Every 4 hours PRN  Every 4 hours PRN       Medications to be used during a flare up, (as Discussed with Provider):           Additional Information:  Use albuterol inhaler with spacer and rinse nebulizer per manufacturers recommendation    Red Zone:  I need urgent medical care Actions   •  Severe shortness of breath even at rest •  Call 911 or seek medical care immediately   •  Not able to do any activity because of breathing    •  Fever or shaking chills    •  Feeling confused or very drowsy     •  Chest pains    •  Coughing up blood          "

## 2021-04-03 NOTE — THERAPY
Occupational Therapy   Initial Evaluation     Patient Name: Lorene Haro  Age:  45 y.o., Sex:  female  Medical Record #: [id number]  Today's Date: 4/3/2021          Assessment  Patient is 45 y.o. female with a diagnosis of acute on chronic COPD exacerbation, currently on 2L supplemental O2. Pt was educated and trained on energy conservation techniques and LB AE training to decrease work effort during ADLs in setting of chronic COPD, pt provided with handout for both for further reinforcement. Pt will benefit from HH OT services to address home s/u and IADL modifications to maximize pt's participation and safety within pt's home environment.       Plan    Recommend Occupational Therapy for Evaluation only     DC Equipment Recommendations: Tub Transfer Bench  Discharge Recommendations: Recommend home health for continued occupational therapy services      Objective       04/03/21 0816   Prior Living Situation   Prior Services Intermittent Physical Support for ADL Per Service   Housing / Facility 2 Story Apartment / Condo   Steps Into Home 0   Steps In Home 0   Elevator Yes   Bathroom Set up Bathtub / Shower Combination;Shower Chair;Grab Bars   Equipment Owned Front-Wheel Walker;Tub Transfer Bench  (FWW delivered and in room)   Lives with - Patient's Self Care Capacity Alone and Able to Care For Self   Comments I with ADLs and has HH aide who comes several times a week to assist with showers and IADLs, has payee rep in community.    Prior Level of ADL Function   Self Feeding Independent   Grooming / Hygiene Independent   Bathing Independent   Dressing Independent   Toileting Independent   Comments has caregiver providing distant supervision during showers   Prior Level of IADL Function   Medication Management Independent   Laundry Independent   Kitchen Mobility Independent   Finances Other (Comments)  (payeee)   Home Management Requires Assist   Shopping Requires Assist   Prior Level Of Mobility Independent Without  Device in Community   Driving / Transportation Other (Comments)  (Caregivers through community)   Occupation (Pre-Hospital Vocational) Retired Due To Disability   Balance Assessment   Sitting Balance (Static) Fair +   Sitting Balance (Dynamic) Fair +   Standing Balance (Static) Fair +   Standing Balance (Dynamic) Fair +   Weight Shift Sitting Good   Weight Shift Standing Good   Comments w/FWW, no lob noted   ADL Assessment   Eating Independent   Grooming Supervision;Standing  (sink side)   Bathing   (discussed home s/u, AE and modifications as appropriate)   Upper Body Dressing Supervision   Lower Body Dressing Supervision   Toileting Supervision   Comments educated and trained on use of sock aid and reacher, able to demonstrate back adequately. Pt provided with handout on how to obtain LB AE in community   Functional Mobility   Sit to Stand Supervised   Bed, Chair, Wheelchair Transfer Supervised   Toilet Transfers Supervised   Mobility within room   Anticipated Discharge Equipment and Recommendations   DC Equipment Recommendations Tub Transfer Bench

## 2021-04-03 NOTE — PROGRESS NOTES
Received notification from overnight nurse regarding brief episode of Afib w/ RVR w/ HR increase to 180s x2 minutes. Pt was asymptomatic during this event. Pt converted spontaneously back to NSR. Pt was recently diagnosed with Afib and is currently on Warfarin. Not on any rate control medications at this time. Most recent K of 4.0, and Mg 2.2.  - c/w tele monitor  - will initiate Metop 12.5mg BID  - already on Warfarin  - maintain K >4 and Mg >2

## 2021-04-05 NOTE — DOCUMENTATION QUERY
[facility]                                                                       Query Response Note      PATIENT:               ADELINA MILLAN  ACCT #:                  [id number]  MRN:                     [id number]  :                      1975  ADMIT DATE:       3/31/2021 10:07 PM  DISCH DATE:        4/3/2021 1:25 PM  RESPONDING  PROVIDER #:        [id number]           QUERY TEXT:    Chronic home 02 use is documented in the Medical Record.  Patient required increase in 02 demand to maintain saturations > or equal to 90%, patient also has SOB, dyspnea, hypoxia and anxiety.  Please further specify the condition this patient has    NOTE:  If an appropriate response is not listed below, please respond with a new note.      The patient's Clinical Indicators include:  - Findings:   - Per H&P and progress note 21:  she is on home oxygen nasal canula.  Discharge Home on Oxygen 2L NC to maintain O2 Saturation between 88%-92% in lieu of her COPD.   - Per ED provider note:  She was recently admitted to this facility for shortness of breath  and was discharged with home oxygen to be used at 1 L/min.  Today she had to increase it to 2 L due to worsening shortness of breath   - RR 17 - 20, Pulse oximetry 93 - 100% on 2-3L NC   - chest x ray:  Bilateral lung volumes are diminished.  Hazy opacity of the left lung base is seen, could represent infiltrate     - Treatments:  chest x ray, 02, antibiotics, Lasix, albuterol, duo neb    - Risk factors:  history of CHF, AFIB, 02 dependence, COPD, obesity, hypoxia, SOB, dyspnea     Thank You,  Karen Neves RN  Clinical Documentation   [email or web address]  [email or web address] via [email or web address]  Options provided:   -- Chronic Respiratory Failure   -- Chronic respiratory failure with hypoxia   -- Acute on chronic respiratory failure with hypoxia   -- Chronic Hypoxemia   -- Obesity with alveolar  hypoventilation   -- Unable to determine      Query created by: Karen Neves on 4/2/2021 10:45 AM    RESPONSE TEXT:    Chronic respiratory failure with hypoxia       QUERY TEXT:    Concern for pneumonia is documented in the progress note dated 4/2/21. The diagnosis of pneumonia is not found in the H&P.  Patient was started on antibiotics during this admission and recently completed a course of azithromycin  Please specify the type of pneumonia and/or causative organism if present (includes probable or suspected)     NOTE:  If an appropriate response is not listed below, please respond with a new note.    The patient's Clinical Indicators include:  - Findings:  Progress note 4/2/21:  concern for pneumonia, started doxycycline   - chest x ray:  Bilateral lung volumes are diminished.  Hazy opacity of the left lung base is seen.   Hazy left lung base opacity could represent infiltrate.  - admit labs:  WBC 15.0, immature granulocytes 0.40, procalcitonin <0.05    - Treatments:  antibiotics, chest x ray, 02, respiratory protocol, albuterol, duoneb    - Risk factors:  recent hospital admission, COPD, 02 dependence, hypoxia, SOB, dyspnea, anxiety    Thank You,  Karen Neves RN  Clinical Documentation   [email or web address]  Connect via [employer]  Options provided:   -- Simple Pneumonia POA   -- Other type of Pneumonia POA, please specify type of pneumonia if known   -- Simple pneumonia developed after admission   -- Other type of Pneumonia developed after admission, please specify type of pneumonia if known   -- Pneumonia has been ruled out   -- Unable to determine      Query created by: Karen Neves on 4/2/2021 11:15 AM    RESPONSE TEXT:    Simple Pneumonia POA          Electronically signed by:  BRENDAN ALFORD MD 4/5/2021 1:10 PM

## 2021-04-06 ENCOUNTER — PATIENT OUTREACH (OUTPATIENT)
Dept: HEALTH INFORMATION MANAGEMENT | Facility: OTHER | Age: 46
End: 2021-04-06

## 2021-04-07 ENCOUNTER — APPOINTMENT (OUTPATIENT)
Dept: VASCULAR LAB | Facility: MEDICAL CENTER | Age: 46
End: 2021-04-07
Attending: INTERNAL MEDICINE
Payer: MEDICAID

## 2021-04-08 ENCOUNTER — ANTICOAGULATION VISIT (OUTPATIENT)
Dept: VASCULAR LAB | Facility: MEDICAL CENTER | Age: 46
End: 2021-04-08
Attending: INTERNAL MEDICINE
Payer: MEDICAID

## 2021-04-08 VITALS — HEART RATE: 73 BPM | SYSTOLIC BLOOD PRESSURE: 133 MMHG | DIASTOLIC BLOOD PRESSURE: 58 MMHG

## 2021-04-08 DIAGNOSIS — I48.91 ATRIAL FIBRILLATION, UNSPECIFIED TYPE (HCC): ICD-10-CM

## 2021-04-08 LAB — INR PPP: 2 (ref 2–3.5)

## 2021-04-08 PROCEDURE — 99212 OFFICE O/P EST SF 10 MIN: CPT | Performed by: NURSE PRACTITIONER

## 2021-04-08 PROCEDURE — 85610 PROTHROMBIN TIME: CPT

## 2021-04-08 RX ORDER — WARFARIN SODIUM 6 MG/1
TABLET ORAL
Qty: 90 TABLET | Refills: 1 | Status: SHIPPED | OUTPATIENT
Start: 2021-04-08 | End: 2021-06-26

## 2021-04-08 SDOH — ECONOMIC STABILITY: FOOD INSECURITY: WITHIN THE PAST 12 MONTHS, THE FOOD YOU BOUGHT JUST DIDN'T LAST AND YOU DIDN'T HAVE MONEY TO GET MORE.: SOMETIMES TRUE

## 2021-04-08 SDOH — ECONOMIC STABILITY: FOOD INSECURITY: WITHIN THE PAST 12 MONTHS, YOU WORRIED THAT YOUR FOOD WOULD RUN OUT BEFORE YOU GOT MONEY TO BUY MORE.: SOMETIMES TRUE

## 2021-04-08 ASSESSMENT — SOCIAL DETERMINANTS OF HEALTH (SDOH)
HOW HARD IS IT FOR YOU TO PAY FOR THE VERY BASICS LIKE FOOD, HOUSING, MEDICAL CARE, AND HEATING?: NOT HARD AT ALL
HOW HARD IS IT FOR YOU TO PAY FOR THE VERY BASICS LIKE FOOD, HOUSING, MEDICAL CARE, AND HEATING?: SOMEWHAT HARD

## 2021-04-08 NOTE — PROGRESS NOTES
Community Health Worker Intake  • Social determinates of health intake Complete.   • Identified barriers to Food.  • Contact information provided to Lorene Bonnie Haro   • Has PCP appointment scheduled   • Scheduled Food Delivery  • Outpatient assessment completed.  • Did the patient receive medications post discharge: Yes    CHW Nelia called pt to complete outpatient assessment. Pt requested a food delivery, CHW will add pt to list. Pt identifies no other barriers to resources and expressed no other needs at this time.     Plan: CHW will d/c pt from CCM team

## 2021-04-08 NOTE — PROGRESS NOTES
Anticoagulation Summary  As of 2021    INR goal:  2.0-3.0   TTR:  --   INR used for dosin.00 (2021)   Warfarin maintenance plan:  6 mg (6 mg x 1) every day   Weekly warfarin total:  42 mg   Plan last modified:  Carlos Barros, PharmD (3/24/2021)   Next INR check:     Target end date:  Indefinite    Indications    A-fib (HCC) [I48.91]             Anticoagulation Episode Summary     INR check location:      Preferred lab:      Send INR reminders to:      Comments:          Anticoagulation Patient Findings      HPI:  Lorene Haro seen in clinic today for follow up on anticoagulation therapy in the presence of AF.   She was hospitalized last week for SOB. Discharged on prednisone x 5 days which she finished yesterday.  Denies any diet changes.   No current symptoms of bleeding or thrombosis reported.    Pt on antiplatelet therapy - none.    A/P:   INR therapeutic.   Continue current regimen.   BP recorded in vitals.    Pt educated to contact our clinic with any changes in medications or s/s of bleeding or thrombosis. Pt is aware to seek immediate medical attention for falls, head injury or deep cuts    Follow up appointment in 1 week(s).    Next Appointment: 2021 at 11:45 am.    Sharri SHRESTHA

## 2021-04-09 LAB
INR BLD: 2 (ref 0.9–1.2)
INR BLD: 2.4 (ref 0.9–1.2)

## 2021-04-11 NOTE — PROGRESS NOTES
Chief Complaint   Patient presents with   • Pulmonary Hypertension        Subjective:   Lorene Haro is a 45 y.o. female who presents today for follow-up with her caregiver, Maricarmen.    Patient of Dr. Borrego.  Current medical problems include COPD, KATHIA, obesity, newly diagnosed with atrial fibrillation, history of alcohol dependence, drug use, current tobacco use.    Recently hospitalized twice for acute hypoxic respiratory failure, treated for COPD exacerbation and discharged on O2. No CTA for PE was performed. She had an echo with normal EF and dilated IVC, pBNP 489.  Additionally she was found to be in atrial fibrillation during the hospitalization, on 3/18.  She was started on warfarin and referred to the AC clinic for monitoring.     Today she came into the office without her O2 and she was quite breathless, O2 sats 80% which improved to >90% with 2L O2. She says her breathing is about normal for her. Her legs are swollen, she has been taking her lasix and urinates frequently but leg swelling hasn't gotten better.     Sia has a CPAP machine but its not working (needs more tubing).    She continues to smoke, she has not had success with any cessation meds.        Past Medical History:   Diagnosis Date   • Asthma    • Bipolar 2 disorder (HCC)    • Chickenpox    • Chronic obstructive pulmonary disease (HCC)    • Hypertension    • Other psychological stress    • Schizophrenic disorder (HCC)    • Yeast infection of the skin 2021         Past Surgical History:   Procedure Laterality Date   • CHOLECYSTECTOMY     • COLECTOMY     • HYSTERECTOMY LAPAROSCOPY     • KNEE ARTHROSCOPY Left          Family History   Problem Relation Age of Onset   • No Known Problems Mother    • Cancer Sister          Social History     Tobacco Use   Smoking Status Former Smoker   • Packs/day: 1.00   • Years: 20.00   • Pack years: 20.00   • Types: Cigarettes   • Quit date: 2020   • Years since quittin.2   Smokeless  Tobacco Never Used   Tobacco Comment    Patient states she quit two days ago           Social History     Substance and Sexual Activity   Alcohol Use No         Allergies   Allergen Reactions   • Amoxicillin Rash and Itching     Tolerates cephalosporins, pt reports that she gets a rash all over her body and gets itchy   • Penicillin G Rash and Itching     pt reports that she gets a rash all over her body and gets itchy         Outpatient Encounter Medications as of 4/12/2021   Medication Sig Dispense Refill   • furosemide (LASIX) 20 MG Tab Take 1 tablet by mouth 2 times a day. 60 tablet 3   • potassium chloride SA (KDUR) 20 MEQ Tab CR Take 1 tablet by mouth every day. 30 tablet 3   • warfarin (COUMADIN) 6 MG Tab Take 1 tab by mouth daily or as directed by anticoagulation clinic 90 tablet 1   • fluticasone (FLONASE) 50 MCG/ACT nasal spray Administer 1 Spray into affected nostril(S) 2 times a day.     • albuterol 108 (90 Base) MCG/ACT Aero Soln inhalation aerosol Inhale 2 Puffs every 6 hours as needed for Shortness of Breath. 18 g 0   • nystatin (MYCOSTATIN) powder Apply underneath breasts and on inner thigh area twice a day for fungal infection (Patient taking differently: Apply 1 Application topically 2 times a day as needed (fungal infection).) 60 g 0   • montelukast (SINGULAIR) 10 MG Tab Take 1 Tab by mouth every bedtime. 30 Tab 11   • atorvastatin (LIPITOR) 20 MG Tab Take 1 Tab by mouth every day. 30 Tab 1   • tiotropium (SPIRIVA HANDIHALER) 18 MCG Cap Inhale 1 Cap by mouth every day. 30 Cap 3   • lithium CR (LITHOBID) 300 MG Tab CR Take 300-600 mg by mouth 2 Times a Day. 1 caps = 300 mg every morning  2 caps = 600 mg every evening     • aripiprazole (ABILIFY) 30 MG tablet Take 30 mg by mouth every morning.     • [DISCONTINUED] furosemide (LASIX) 20 MG Tab Take 1 Tab by mouth every day. 30 Tab 0     No facility-administered encounter medications on file as of 4/12/2021.         Review of Systems   Constitutional:  "Positive for malaise/fatigue.   Respiratory: Positive for shortness of breath. Negative for wheezing.    Cardiovascular: Positive for palpitations and leg swelling. Negative for chest pain and orthopnea.   Gastrointestinal: Negative for blood in stool and melena.   Genitourinary: Positive for frequency (with lasix). Negative for hematuria.   Neurological: Negative for dizziness.          Objective:   /86 (BP Location: Left arm, Patient Position: Sitting, BP Cuff Size: Adult)   Pulse 82   Resp 16   Ht 1.549 m (5' 1\")   Wt (!) 127 kg (280 lb)   LMP  (LMP Unknown)   SpO2 97%   BMI 52.91 kg/m²        Physical Exam  Vitals and nursing note reviewed.   Constitutional:       General: She is not in acute distress.     Appearance: Normal appearance.      Comments: Chronically ill appearing, using walker   HENT:      Head: Normocephalic and atraumatic.      Mouth/Throat:      Mouth: Mucous membranes are moist.   Eyes:      General: No scleral icterus.  Cardiovascular:      Rate and Rhythm: Normal rate and regular rhythm.      Pulses: Normal pulses.      Heart sounds: No murmur.   Pulmonary:      Effort: Pulmonary effort is normal. No respiratory distress.      Breath sounds: No wheezing.      Comments: On 2L  Abdominal:      Palpations: Abdomen is soft.   Musculoskeletal:      Right lower leg: Edema present.      Left lower leg: Edema present.   Skin:     General: Skin is warm and dry.      Capillary Refill: Capillary refill takes less than 2 seconds.   Neurological:      General: No focal deficit present.      Mental Status: She is alert and oriented to person, place, and time.   Psychiatric:         Judgment: Judgment normal.       Monitoring strips 3/23 reviewed in media    Assessment:     1. Paroxysmal atrial fibrillation (HCC)  Basic Metabolic Panel    [facility] ZIO PATCH MONITOR   2. Pulmonary hypertension (HCC)     3. Morbid obesity (HCC)     4. High risk medication use  Basic Metabolic Panel   5. Tobacco use  " REFERRAL TO TOBACCO CESSATION PROGRAM        Medical Decision Making:  Today's Assessment / Plan:   Hospital follow up  Acute on Chronic hypoxic Respiratory Failure  - treated for COPD exacerbations, likely a component of pHTN, HFpEF (risk factors include obesity, KATHIA, atrial fibrillation). PE was not ruled out however now she is on warfarin  - she appears volume overloaded today, I'll increase her lasix to 20mg BID, potassium 20meq daily. BMP in 2 weeks  - follow up with sleep med for parts for CPAP  - has follow up with Pulm at end of month    Atrial Fibrillation, Paroxysmal, new diagnosis?   EKG on 3/18 appears to be sinus rhythm with a PAC, no other EKGs with afib on it, I reviewed her tele strips in media and saw no sign of atrial fibrillation, therefore I am not sure this is an accurate diagnosis  - obtain zio patch to determine afib burden, confirm/deny diagnosis?  - not on any rate controlling medicines    Chronic Anticoagulation  - [id number]: at most 2 (female, HF), guidelines recommend anticoagulation for CHADSVASC of 3 or higher in females  - will continue OAC for now, HAS BLED is 0. If no afib burden on zio patch I would discuss stopped anticoagulation with the patient.     Tobacco use   - referred to cessation program at [facility]. Reviewed dangers of smoking with O2 use       Zio patch for afib diagnosis. Increase diuresis, BMP in 2 weeks. Follow up in 3mo, sooner if problems

## 2021-04-12 ENCOUNTER — OFFICE VISIT (OUTPATIENT)
Dept: CARDIOLOGY | Facility: MEDICAL CENTER | Age: 46
End: 2021-04-12
Payer: MEDICAID

## 2021-04-12 VITALS
RESPIRATION RATE: 16 BRPM | OXYGEN SATURATION: 97 % | HEART RATE: 82 BPM | HEIGHT: 61 IN | WEIGHT: 280 LBS | SYSTOLIC BLOOD PRESSURE: 106 MMHG | BODY MASS INDEX: 52.87 KG/M2 | DIASTOLIC BLOOD PRESSURE: 86 MMHG

## 2021-04-12 DIAGNOSIS — Z72.0 TOBACCO USE: ICD-10-CM

## 2021-04-12 DIAGNOSIS — E66.01 MORBID OBESITY (HCC): ICD-10-CM

## 2021-04-12 DIAGNOSIS — Z79.899 HIGH RISK MEDICATION USE: ICD-10-CM

## 2021-04-12 DIAGNOSIS — I48.0 PAROXYSMAL ATRIAL FIBRILLATION (HCC): ICD-10-CM

## 2021-04-12 DIAGNOSIS — I27.20 PULMONARY HYPERTENSION (HCC): ICD-10-CM

## 2021-04-12 PROCEDURE — 99214 OFFICE O/P EST MOD 30 MIN: CPT | Performed by: PHYSICIAN ASSISTANT

## 2021-04-12 RX ORDER — FUROSEMIDE 20 MG/1
20 TABLET ORAL 2 TIMES DAILY
Qty: 60 TABLET | Refills: 3 | Status: SHIPPED | OUTPATIENT
Start: 2021-04-12 | End: 2021-08-06

## 2021-04-12 RX ORDER — POTASSIUM CHLORIDE 20 MEQ/1
20 TABLET, EXTENDED RELEASE ORAL DAILY
Qty: 30 TABLET | Refills: 3 | Status: SHIPPED | OUTPATIENT
Start: 2021-04-12 | End: 2021-09-07

## 2021-04-12 ASSESSMENT — ENCOUNTER SYMPTOMS
DIZZINESS: 0
PALPITATIONS: 1
ORTHOPNEA: 0
BLOOD IN STOOL: 0
WHEEZING: 0
SHORTNESS OF BREATH: 1

## 2021-04-12 ASSESSMENT — FIBROSIS 4 INDEX: FIB4 SCORE: 0.2

## 2021-04-12 NOTE — PATIENT INSTRUCTIONS
Take Lasix 20mg twice a day (once in the morning and once in the afternoon)    Take Potassium once a day.     Have your labs drawn in 2 weeks    Please wear the Zio Patch for 2 weeks so we can detect the amount of atrial fibrillation you are having.

## 2021-04-12 NOTE — Clinical Note
I saw Lorene for hospital follow up where she was diagnosed with atrial fibrillation. I cannot find any data to support this diagnosis, an ekg on 3/18 reads AFIB but I see p waves, I think its sinus with PAC. I placed a zio patch to see if we're missing anything but if its negative I would consider stopping anticoagulation, her CHADVASC is only 2 anyway.   She sees you in July so I just wanted to give a heads up on what I'm thinking.

## 2021-04-14 ENCOUNTER — HOSPITAL ENCOUNTER (INPATIENT)
Facility: MEDICAL CENTER | Age: 46
LOS: 5 days | DRG: 189 | End: 2021-04-20
Attending: EMERGENCY MEDICINE | Admitting: EMERGENCY MEDICINE
Payer: MEDICAID

## 2021-04-14 ENCOUNTER — APPOINTMENT (OUTPATIENT)
Dept: RADIOLOGY | Facility: MEDICAL CENTER | Age: 46
DRG: 189 | End: 2021-04-14
Attending: EMERGENCY MEDICINE
Payer: MEDICAID

## 2021-04-14 ENCOUNTER — ANTICOAGULATION VISIT (OUTPATIENT)
Dept: VASCULAR LAB | Facility: MEDICAL CENTER | Age: 46
End: 2021-04-14
Attending: INTERNAL MEDICINE
Payer: MEDICAID

## 2021-04-14 DIAGNOSIS — J44.9 OSA AND COPD OVERLAP SYNDROME (HCC): ICD-10-CM

## 2021-04-14 DIAGNOSIS — I48.91 ATRIAL FIBRILLATION, UNSPECIFIED TYPE (HCC): ICD-10-CM

## 2021-04-14 DIAGNOSIS — I48.0 PAROXYSMAL ATRIAL FIBRILLATION (HCC): ICD-10-CM

## 2021-04-14 DIAGNOSIS — J44.1 ACUTE EXACERBATION OF CHRONIC OBSTRUCTIVE PULMONARY DISEASE (COPD) (HCC): ICD-10-CM

## 2021-04-14 DIAGNOSIS — E66.01 CLASS 3 SEVERE OBESITY DUE TO EXCESS CALORIES WITH SERIOUS COMORBIDITY AND BODY MASS INDEX (BMI) OF 50.0 TO 59.9 IN ADULT (HCC): ICD-10-CM

## 2021-04-14 DIAGNOSIS — J96.01 ACUTE RESPIRATORY FAILURE WITH HYPOXIA (HCC): ICD-10-CM

## 2021-04-14 DIAGNOSIS — G47.33 OSA AND COPD OVERLAP SYNDROME (HCC): ICD-10-CM

## 2021-04-14 LAB
ANION GAP SERPL CALC-SCNC: 14 MMOL/L (ref 7–16)
BASOPHILS # BLD AUTO: 0.1 % (ref 0–1.8)
BASOPHILS # BLD: 0.01 K/UL (ref 0–0.12)
BUN SERPL-MCNC: 9 MG/DL (ref 8–22)
CALCIUM SERPL-MCNC: 9.5 MG/DL (ref 8.5–10.5)
CHLORIDE SERPL-SCNC: 110 MMOL/L (ref 96–112)
CO2 SERPL-SCNC: 29 MMOL/L (ref 20–33)
COMMENT 1642: NORMAL
CREAT SERPL-MCNC: 0.96 MG/DL (ref 0.5–1.4)
EKG IMPRESSION: NORMAL
EOSINOPHIL # BLD AUTO: 0.2 K/UL (ref 0–0.51)
EOSINOPHIL NFR BLD: 2 % (ref 0–6.9)
ERYTHROCYTE [DISTWIDTH] IN BLOOD BY AUTOMATED COUNT: 56.3 FL (ref 35.9–50)
FLUAV RNA SPEC QL NAA+PROBE: NEGATIVE
FLUBV RNA SPEC QL NAA+PROBE: NEGATIVE
GLUCOSE SERPL-MCNC: 172 MG/DL (ref 65–99)
HCT VFR BLD AUTO: 43.3 % (ref 37–47)
HGB BLD-MCNC: 12.7 G/DL (ref 12–16)
IMM GRANULOCYTES # BLD AUTO: 0.04 K/UL (ref 0–0.11)
IMM GRANULOCYTES NFR BLD AUTO: 0.4 % (ref 0–0.9)
INR PPP: 0.93 (ref 0.87–1.13)
INR PPP: 1.3 (ref 2–3.5)
LYMPHOCYTES # BLD AUTO: 1.49 K/UL (ref 1–4.8)
LYMPHOCYTES NFR BLD: 14.8 % (ref 22–41)
MCH RBC QN AUTO: 27.5 PG (ref 27–33)
MCHC RBC AUTO-ENTMCNC: 29.3 G/DL (ref 33.6–35)
MCV RBC AUTO: 93.7 FL (ref 81.4–97.8)
MONOCYTES # BLD AUTO: 0.62 K/UL (ref 0–0.85)
MONOCYTES NFR BLD AUTO: 6.2 % (ref 0–13.4)
MORPHOLOGY BLD-IMP: NORMAL
NEUTROPHILS # BLD AUTO: 7.7 K/UL (ref 2–7.15)
NEUTROPHILS NFR BLD: 76.5 % (ref 44–72)
NRBC # BLD AUTO: 0 K/UL
NRBC BLD-RTO: 0 /100 WBC
NT-PROBNP SERPL IA-MCNC: 201 PG/ML (ref 0–125)
PLATELET # BLD AUTO: 237 K/UL (ref 164–446)
PLATELET BLD QL SMEAR: NORMAL
PMV BLD AUTO: 11.3 FL (ref 9–12.9)
POTASSIUM SERPL-SCNC: 4.3 MMOL/L (ref 3.6–5.5)
PROTHROMBIN TIME: 12.8 SEC (ref 12–14.6)
RBC # BLD AUTO: 4.62 M/UL (ref 4.2–5.4)
RBC BLD AUTO: NORMAL
RSV RNA SPEC QL NAA+PROBE: NEGATIVE
SARS-COV-2 RNA RESP QL NAA+PROBE: NOTDETECTED
SODIUM SERPL-SCNC: 153 MMOL/L (ref 135–145)
SPECIMEN SOURCE: NORMAL
TROPONIN T SERPL-MCNC: 24 NG/L (ref 6–19)
WBC # BLD AUTO: 10.1 K/UL (ref 4.8–10.8)

## 2021-04-14 PROCEDURE — 94640 AIRWAY INHALATION TREATMENT: CPT

## 2021-04-14 PROCEDURE — 80048 BASIC METABOLIC PNL TOTAL CA: CPT

## 2021-04-14 PROCEDURE — 87040 BLOOD CULTURE FOR BACTERIA: CPT | Mod: 91

## 2021-04-14 PROCEDURE — 85610 PROTHROMBIN TIME: CPT

## 2021-04-14 PROCEDURE — 83880 ASSAY OF NATRIURETIC PEPTIDE: CPT

## 2021-04-14 PROCEDURE — 96374 THER/PROPH/DIAG INJ IV PUSH: CPT

## 2021-04-14 PROCEDURE — 84484 ASSAY OF TROPONIN QUANT: CPT

## 2021-04-14 PROCEDURE — 36415 COLL VENOUS BLD VENIPUNCTURE: CPT

## 2021-04-14 PROCEDURE — 5A09357 ASSISTANCE WITH RESPIRATORY VENTILATION, LESS THAN 24 CONSECUTIVE HOURS, CONTINUOUS POSITIVE AIRWAY PRESSURE: ICD-10-PCS | Performed by: INTERNAL MEDICINE

## 2021-04-14 PROCEDURE — 0241U HCHG SARS-COV-2 COVID-19 NFCT DS RESP RNA 4 TRGT MIC: CPT

## 2021-04-14 PROCEDURE — C9803 HOPD COVID-19 SPEC COLLECT: HCPCS | Performed by: EMERGENCY MEDICINE

## 2021-04-14 PROCEDURE — 71045 X-RAY EXAM CHEST 1 VIEW: CPT

## 2021-04-14 PROCEDURE — 85025 COMPLETE CBC W/AUTO DIFF WBC: CPT

## 2021-04-14 PROCEDURE — 94660 CPAP INITIATION&MGMT: CPT

## 2021-04-14 PROCEDURE — 700101 HCHG RX REV CODE 250: Performed by: EMERGENCY MEDICINE

## 2021-04-14 PROCEDURE — 93005 ELECTROCARDIOGRAM TRACING: CPT | Performed by: EMERGENCY MEDICINE

## 2021-04-14 PROCEDURE — 99291 CRITICAL CARE FIRST HOUR: CPT

## 2021-04-14 PROCEDURE — 700111 HCHG RX REV CODE 636 W/ 250 OVERRIDE (IP): Performed by: EMERGENCY MEDICINE

## 2021-04-14 PROCEDURE — 99212 OFFICE O/P EST SF 10 MIN: CPT | Performed by: NURSE PRACTITIONER

## 2021-04-14 RX ORDER — METHYLPREDNISOLONE SODIUM SUCCINATE 125 MG/2ML
125 INJECTION, POWDER, LYOPHILIZED, FOR SOLUTION INTRAMUSCULAR; INTRAVENOUS ONCE
Status: COMPLETED | OUTPATIENT
Start: 2021-04-14 | End: 2021-04-14

## 2021-04-14 RX ORDER — ACETAMINOPHEN 500 MG
500 TABLET ORAL EVERY 6 HOURS PRN
COMMUNITY
End: 2021-10-16 | Stop reason: DRUGHIGH

## 2021-04-14 RX ADMIN — METHYLPREDNISOLONE SODIUM SUCCINATE 125 MG: 125 INJECTION, POWDER, FOR SOLUTION INTRAMUSCULAR; INTRAVENOUS at 22:45

## 2021-04-14 RX ADMIN — ALBUTEROL SULFATE 10 MG: 5 SOLUTION RESPIRATORY (INHALATION) at 22:23

## 2021-04-14 ASSESSMENT — LIFESTYLE VARIABLES
HAVE YOU EVER FELT YOU SHOULD CUT DOWN ON YOUR DRINKING: NO
CONSUMPTION TOTAL: NEGATIVE
EVER FELT BAD OR GUILTY ABOUT YOUR DRINKING: NO
EVER HAD A DRINK FIRST THING IN THE MORNING TO STEADY YOUR NERVES TO GET RID OF A HANGOVER: NO
DOES PATIENT WANT TO STOP DRINKING: NO
TOTAL SCORE: 0
DO YOU DRINK ALCOHOL: NO
HOW MANY TIMES IN THE PAST YEAR HAVE YOU HAD 5 OR MORE DRINKS IN A DAY: 0
AVERAGE NUMBER OF DAYS PER WEEK YOU HAVE A DRINK CONTAINING ALCOHOL: 0
TOTAL SCORE: 0
HAVE PEOPLE ANNOYED YOU BY CRITICIZING YOUR DRINKING: NO
TOTAL SCORE: 0
ON A TYPICAL DAY WHEN YOU DRINK ALCOHOL HOW MANY DRINKS DO YOU HAVE: 0

## 2021-04-14 ASSESSMENT — FIBROSIS 4 INDEX: FIB4 SCORE: 0.2

## 2021-04-14 ASSESSMENT — PULMONARY FUNCTION TESTS
EPAP_CMH2O: 6
EPAP_CMH2O: 6

## 2021-04-14 NOTE — PROGRESS NOTES
Anticoagulation Summary  As of 2021    INR goal:  2.0-3.0   TTR:  0.0 % (6 d)   INR used for dosin.30 (2021)   Warfarin maintenance plan:  6 mg (6 mg x 1) every day   Weekly warfarin total:  42 mg   Plan last modified:  Carlos Barros, PharmD (3/24/2021)   Next INR check:  2021   Target end date:  Indefinite    Indications    A-fib (HCC) [I48.91]             Anticoagulation Episode Summary     INR check location:      Preferred lab:      Send INR reminders to:      Comments:          Anticoagulation Patient Findings      HPI:  Lorene Haro seen in clinic today for follow up on anticoagulation therapy in the presence of AF.   Denies any changes to current medical/health status since last appointment.   Denies any medication or diet changes.   No current symptoms of bleeding or thrombosis reported.  She missed two doses by mistake.    Pt is NOT on antiplatelet therapy.    A/P:   INR subtherapeutic. CHADS 2 score = 1 (chf).  Increase two doses then continue current regimen.     Pt educated to contact our clinic with any changes in medications or s/s of bleeding or thrombosis. Pt is aware to seek immediate medical attention for falls, head injury or deep cuts    Follow up appointment in 1 week(s).    Next Appointment: 2021 at 8:30 am.     Sharri SHRESTHA

## 2021-04-15 ENCOUNTER — APPOINTMENT (OUTPATIENT)
Dept: SLEEP MEDICINE | Facility: MEDICAL CENTER | Age: 46
End: 2021-04-15
Payer: MEDICAID

## 2021-04-15 PROBLEM — E11.9 DM2 (DIABETES MELLITUS, TYPE 2) (HCC): Status: ACTIVE | Noted: 2021-04-15

## 2021-04-15 LAB
INR PPP: 1.24 (ref 0.87–1.13)
LITHIUM SERPL-MCNC: 0.2 MMOL/L (ref 0.6–1.2)
PROTHROMBIN TIME: 16 SEC (ref 12–14.6)

## 2021-04-15 PROCEDURE — A9270 NON-COVERED ITEM OR SERVICE: HCPCS | Performed by: INTERNAL MEDICINE

## 2021-04-15 PROCEDURE — 94640 AIRWAY INHALATION TREATMENT: CPT

## 2021-04-15 PROCEDURE — 85610 PROTHROMBIN TIME: CPT

## 2021-04-15 PROCEDURE — 770022 HCHG ROOM/CARE - ICU (200)

## 2021-04-15 PROCEDURE — 99291 CRITICAL CARE FIRST HOUR: CPT | Performed by: EMERGENCY MEDICINE

## 2021-04-15 PROCEDURE — 700111 HCHG RX REV CODE 636 W/ 250 OVERRIDE (IP): Performed by: INTERNAL MEDICINE

## 2021-04-15 PROCEDURE — 99223 1ST HOSP IP/OBS HIGH 75: CPT | Mod: AI | Performed by: STUDENT IN AN ORGANIZED HEALTH CARE EDUCATION/TRAINING PROGRAM

## 2021-04-15 PROCEDURE — 5A09357 ASSISTANCE WITH RESPIRATORY VENTILATION, LESS THAN 24 CONSECUTIVE HOURS, CONTINUOUS POSITIVE AIRWAY PRESSURE: ICD-10-PCS | Performed by: INTERNAL MEDICINE

## 2021-04-15 PROCEDURE — 700101 HCHG RX REV CODE 250: Performed by: EMERGENCY MEDICINE

## 2021-04-15 PROCEDURE — 94760 N-INVAS EAR/PLS OXIMETRY 1: CPT

## 2021-04-15 PROCEDURE — 700102 HCHG RX REV CODE 250 W/ 637 OVERRIDE(OP): Performed by: INTERNAL MEDICINE

## 2021-04-15 PROCEDURE — 80178 ASSAY OF LITHIUM: CPT

## 2021-04-15 PROCEDURE — 94660 CPAP INITIATION&MGMT: CPT

## 2021-04-15 PROCEDURE — 700111 HCHG RX REV CODE 636 W/ 250 OVERRIDE (IP): Performed by: EMERGENCY MEDICINE

## 2021-04-15 RX ORDER — PREDNISONE 20 MG/1
40 TABLET ORAL DAILY
Status: COMPLETED | OUTPATIENT
Start: 2021-04-15 | End: 2021-04-19

## 2021-04-15 RX ORDER — FUROSEMIDE 10 MG/ML
20 INJECTION INTRAMUSCULAR; INTRAVENOUS
Status: DISCONTINUED | OUTPATIENT
Start: 2021-04-15 | End: 2021-04-16

## 2021-04-15 RX ORDER — LITHIUM CARBONATE 300 MG/1
300 TABLET, FILM COATED, EXTENDED RELEASE ORAL EVERY MORNING
Status: DISCONTINUED | OUTPATIENT
Start: 2021-04-16 | End: 2021-04-20 | Stop reason: HOSPADM

## 2021-04-15 RX ORDER — LITHIUM CARBONATE 300 MG/1
300 TABLET, FILM COATED, EXTENDED RELEASE ORAL 2 TIMES DAILY
Status: DISCONTINUED | OUTPATIENT
Start: 2021-04-15 | End: 2021-04-15

## 2021-04-15 RX ORDER — FUROSEMIDE 10 MG/ML
20 INJECTION INTRAMUSCULAR; INTRAVENOUS
Status: DISCONTINUED | OUTPATIENT
Start: 2021-04-15 | End: 2021-04-15

## 2021-04-15 RX ORDER — ATORVASTATIN CALCIUM 20 MG/1
20 TABLET, FILM COATED ORAL EVERY EVENING
Status: DISCONTINUED | OUTPATIENT
Start: 2021-04-15 | End: 2021-04-20 | Stop reason: HOSPADM

## 2021-04-15 RX ORDER — LITHIUM CARBONATE 300 MG/1
600 TABLET, FILM COATED, EXTENDED RELEASE ORAL EVERY EVENING
Status: DISCONTINUED | OUTPATIENT
Start: 2021-04-15 | End: 2021-04-20 | Stop reason: HOSPADM

## 2021-04-15 RX ORDER — TIOTROPIUM BROMIDE 18 UG/1
1 CAPSULE ORAL; RESPIRATORY (INHALATION) DAILY
Status: DISCONTINUED | OUTPATIENT
Start: 2021-04-15 | End: 2021-04-15

## 2021-04-15 RX ORDER — MONTELUKAST SODIUM 10 MG/1
10 TABLET ORAL
Status: DISCONTINUED | OUTPATIENT
Start: 2021-04-15 | End: 2021-04-20 | Stop reason: HOSPADM

## 2021-04-15 RX ORDER — FLUTICASONE PROPIONATE 50 MCG
1 SPRAY, SUSPENSION (ML) NASAL 2 TIMES DAILY
Status: DISCONTINUED | OUTPATIENT
Start: 2021-04-15 | End: 2021-04-20 | Stop reason: HOSPADM

## 2021-04-15 RX ORDER — LITHIUM CARBONATE 300 MG/1
300 TABLET, FILM COATED, EXTENDED RELEASE ORAL DAILY
Status: DISCONTINUED | OUTPATIENT
Start: 2021-04-15 | End: 2021-04-15

## 2021-04-15 RX ORDER — IPRATROPIUM BROMIDE AND ALBUTEROL SULFATE 2.5; .5 MG/3ML; MG/3ML
3 SOLUTION RESPIRATORY (INHALATION)
Status: DISCONTINUED | OUTPATIENT
Start: 2021-04-15 | End: 2021-04-16

## 2021-04-15 RX ORDER — WARFARIN SODIUM 6 MG/1
6 TABLET ORAL DAILY
Status: DISCONTINUED | OUTPATIENT
Start: 2021-04-16 | End: 2021-04-18

## 2021-04-15 RX ORDER — ARIPIPRAZOLE 15 MG/1
30 TABLET ORAL EVERY MORNING
Status: DISCONTINUED | OUTPATIENT
Start: 2021-04-15 | End: 2021-04-20 | Stop reason: HOSPADM

## 2021-04-15 RX ORDER — NYSTATIN 100000 [USP'U]/G
1 POWDER TOPICAL 2 TIMES DAILY PRN
Status: DISPENSED | OUTPATIENT
Start: 2021-04-15 | End: 2021-04-18

## 2021-04-15 RX ORDER — BUDESONIDE 0.5 MG/2ML
0.5 INHALANT ORAL
Status: DISCONTINUED | OUTPATIENT
Start: 2021-04-15 | End: 2021-04-20 | Stop reason: HOSPADM

## 2021-04-15 RX ORDER — LITHIUM CARBONATE 300 MG/1
300-600 TABLET, FILM COATED, EXTENDED RELEASE ORAL 2 TIMES DAILY
Status: DISCONTINUED | OUTPATIENT
Start: 2021-04-15 | End: 2021-04-15

## 2021-04-15 RX ADMIN — WARFARIN SODIUM 9 MG: 6 TABLET ORAL at 17:58

## 2021-04-15 RX ADMIN — IPRATROPIUM BROMIDE AND ALBUTEROL SULFATE 3 ML: .5; 2.5 SOLUTION RESPIRATORY (INHALATION) at 00:29

## 2021-04-15 RX ADMIN — LITHIUM CARBONATE 600 MG: 300 TABLET, FILM COATED, EXTENDED RELEASE ORAL at 17:58

## 2021-04-15 RX ADMIN — IPRATROPIUM BROMIDE AND ALBUTEROL SULFATE 3 ML: .5; 2.5 SOLUTION RESPIRATORY (INHALATION) at 06:49

## 2021-04-15 RX ADMIN — FUROSEMIDE 20 MG: 10 INJECTION, SOLUTION INTRAMUSCULAR; INTRAVENOUS at 12:30

## 2021-04-15 RX ADMIN — ATORVASTATIN CALCIUM 20 MG: 20 TABLET, FILM COATED ORAL at 17:58

## 2021-04-15 RX ADMIN — ARIPIPRAZOLE 30 MG: 15 TABLET ORAL at 12:28

## 2021-04-15 RX ADMIN — IPRATROPIUM BROMIDE AND ALBUTEROL SULFATE 3 ML: .5; 2.5 SOLUTION RESPIRATORY (INHALATION) at 03:46

## 2021-04-15 RX ADMIN — FUROSEMIDE 20 MG: 10 INJECTION, SOLUTION INTRAMUSCULAR; INTRAVENOUS at 17:58

## 2021-04-15 RX ADMIN — BUDESONIDE 0.5 MG: 0.5 SUSPENSION RESPIRATORY (INHALATION) at 20:10

## 2021-04-15 RX ADMIN — FLUTICASONE PROPIONATE 50 MCG: 50 SPRAY, METERED NASAL at 17:57

## 2021-04-15 RX ADMIN — IPRATROPIUM BROMIDE AND ALBUTEROL SULFATE 3 ML: .5; 2.5 SOLUTION RESPIRATORY (INHALATION) at 10:30

## 2021-04-15 RX ADMIN — TIOTROPIUM BROMIDE INHALATION SPRAY 5 MCG: 3.12 SPRAY, METERED RESPIRATORY (INHALATION) at 12:29

## 2021-04-15 RX ADMIN — FLUTICASONE PROPIONATE 50 MCG: 50 SPRAY, METERED NASAL at 12:29

## 2021-04-15 RX ADMIN — PREDNISONE 40 MG: 20 TABLET ORAL at 12:28

## 2021-04-15 RX ADMIN — NYSTATIN 100000 UNITS: 100000 POWDER TOPICAL at 12:30

## 2021-04-15 RX ADMIN — ENOXAPARIN SODIUM 40 MG: 40 INJECTION SUBCUTANEOUS at 05:08

## 2021-04-15 RX ADMIN — IPRATROPIUM BROMIDE AND ALBUTEROL SULFATE 3 ML: .5; 2.5 SOLUTION RESPIRATORY (INHALATION) at 14:08

## 2021-04-15 RX ADMIN — IPRATROPIUM BROMIDE AND ALBUTEROL SULFATE 3 ML: .5; 2.5 SOLUTION RESPIRATORY (INHALATION) at 20:10

## 2021-04-15 ASSESSMENT — CHA2DS2 SCORE
AGE 75 OR GREATER: NO
VASCULAR DISEASE: NO
AGE 65 TO 74: NO
SEX: FEMALE
HYPERTENSION: YES
CHF OR LEFT VENTRICULAR DYSFUNCTION: NO
DIABETES: NO
CHA2DS2 VASC SCORE: 2
PRIOR STROKE OR TIA OR THROMBOEMBOLISM: NO

## 2021-04-15 ASSESSMENT — ENCOUNTER SYMPTOMS
VOMITING: 0
CHILLS: 0
NAUSEA: 0
WHEEZING: 1
ABDOMINAL PAIN: 0
PHOTOPHOBIA: 0
SHORTNESS OF BREATH: 1
HEADACHES: 0
FALLS: 0
FEVER: 0

## 2021-04-15 ASSESSMENT — PAIN DESCRIPTION - PAIN TYPE
TYPE: ACUTE PAIN

## 2021-04-15 ASSESSMENT — PULMONARY FUNCTION TESTS
EPAP_CMH2O: 6

## 2021-04-15 ASSESSMENT — FIBROSIS 4 INDEX: FIB4 SCORE: 0.22

## 2021-04-15 NOTE — DIETARY
NUTRITION SERVICES: BMI - Pt with BMI >40 (=Body mass index is 48.59 kg/m².), morbid obesity. Weight loss counseling not appropriate in acute care setting. RECOMMEND - Referral to outpatient nutrition services for weight management after D/C.

## 2021-04-15 NOTE — ED NOTES
Reports changes from last visit 03/31/21-04/03/21. Added potassium and increased furosemide.  Med Rec completed: per patient at bedside  Preferred Pharmacy: [facility] [address] or [facility]  Allergies:  Allergies   Allergen Reactions   • Amoxicillin Rash and Itching     Tolerates cephalosporins, pt reports that she gets a rash all over her body and gets itchy   • Penicillin G Rash and Itching     pt reports that she gets a rash all over her body and gets itchy       No ORAL antibiotics in last 14 days    Home Medications:  Medication Sig Comments   • furosemide (LASIX) 20 MG Tab Take 1 tablet by mouth 2 times a day.    • potassium chloride SA (KDUR) 20 MEQ Tab CR Take 1 tablet by mouth every day.    • warfarin (COUMADIN) 6 MG Tab Take 1 tab by mouth daily or as directed by anticoagulation clinic    Reports that she was told to take 1 1/2 tablets for two days then start with 1 tablet until the next INR.  04/14/21-9 mg  04/15/21-9 mg  04/16/21-6 mg until next INR)   • fluticasone (FLONASE) 50 MCG/ACT nasal spray Administer 1 Spray into affected nostril(S) 2 times a day.    • albuterol 108 (90 Base) MCG/ACT Aero Soln inhalation aerosol Inhale 2 Puffs every 6 hours as needed for Shortness of Breath.    • nystatin (MYCOSTATIN) powder Apply underneath breasts and on inner thigh area twice a day for fungal infection  (Patient taking differently: Apply 1 Application topically 2 times a day as needed (fungal infection).)   • montelukast (SINGULAIR) 10 MG Tab Take 1 Tab by mouth every bedtime.    • tiotropium (SPIRIVA HANDIHALER) 18 MCG Cap Inhale 1 Cap by mouth every day.    • lithium CR (LITHOBID) 300 MG Tab CR Take 300-600 mg by mouth 2 Times a Day.   1 caps = 300 mg every morning  2 caps = 600 mg every evening    • aripiprazole (ABILIFY) 30 MG tablet Take 30 mg by mouth every morning.

## 2021-04-15 NOTE — ED PROVIDER NOTES
ED Provider Note    Scribed for Yanick Olivia M.D. by Misael Ramos. 4/14/2021  9:11 PM    Primary care provider: SHANKAR Gleason  Means of arrival: EMS  History obtained from: Patient  History limited by: Critical Condition    CHIEF COMPLAINT  Chief Complaint   Patient presents with   • Shortness of Breath   Respiratory distress    HPI  Lorene Haro is a 45 y.o. female who presents to the Emergency Department for evaluation of worsening, chronic shortness of breath worsening for the last two days. She was experiencing shortness of breath when she decided to call EMS. She had bipap placed by  [facility] and upon arrival she was hypotensive. She was admitted into the hospital on 04/01/21 and discharged on 04/03/21for CHF exacerbation. No other associated symptoms idenitified. Negative for fever, vomiting, or syncope. No alleviating or exacerbating factors identified. The patient has a history of COPD and CHF.     No further details of history of present illness can be obtained within the constraints of urgency of the patient's clinical condition    REVIEW OF SYSTEMS  Pertinent positives include shortness of breath. Pertinent negatives include no fever, vomiting, or syncope.  All other systems reviewed and negative.    No further details of review of systems can be obtained within the constraints of urgency of the patient's clinical condition    PAST MEDICAL HISTORY   has a past medical history of Asthma, Bipolar 2 disorder (HCC), Chickenpox, Chronic obstructive pulmonary disease (HCC), Hypertension, Other psychological stress, Schizophrenic disorder (HCC), and Yeast infection of the skin (4/1/2021).    SURGICAL HISTORY   has a past surgical history that includes hysterectomy laparoscopy; colectomy; cholecystectomy; and knee arthroscopy (Left).    SOCIAL HISTORY  Social History     Tobacco Use   • Smoking status: Former Smoker     Packs/day: 1.00     Years: 20.00     Pack years: 20.00     Types: Cigarettes      "Quit date: 2020     Years since quittin.2   • Smokeless tobacco: Never Used   • Tobacco comment: Patient states she quit two days ago    Substance Use Topics   • Alcohol use: No   • Drug use: No      Social History     Substance and Sexual Activity   Drug Use No       FAMILY HISTORY  Family History   Problem Relation Age of Onset   • No Known Problems Mother    • Cancer Sister        CURRENT MEDICATIONS  Home Medications     Reviewed by Joe Cuellar Goes Out, PhT (Pharmacy Tech) on 21 at 2116  Med List Status: Complete   Medication Last Dose Status   albuterol 108 (90 Base) MCG/ACT Aero Soln inhalation aerosol 2021 Active   aripiprazole (ABILIFY) 30 MG tablet 2021 Active   atorvastatin (LIPITOR) 20 MG Tab Not Taking Active   fluticasone (FLONASE) 50 MCG/ACT nasal spray 2021 Active   furosemide (LASIX) 20 MG Tab 2021 Active   lithium CR (LITHOBID) 300 MG Tab CR 2021 Active   montelukast (SINGULAIR) 10 MG Tab 2021 Active   nystatin (MYCOSTATIN) powder 2021 Active   potassium chloride SA (KDUR) 20 MEQ Tab CR 2021 Active   tiotropium (SPIRIVA HANDIHALER) 18 MCG Cap 2021 Active   warfarin (COUMADIN) 6 MG Tab 2021 Active                ALLERGIES  Allergies   Allergen Reactions   • Amoxicillin Rash and Itching     Tolerates cephalosporins, pt reports that she gets a rash all over her body and gets itchy   • Penicillin G Rash and Itching     pt reports that she gets a rash all over her body and gets itchy       PHYSICAL EXAM  VITAL SIGNS: /84   Pulse 76   Temp 36.9 °C (98.5 °F) (Axillary)   Resp 20   Ht 1.626 m (5' 4\")   Wt (!) 127 kg (280 lb)   LMP  (LMP Unknown)   SpO2 99%   BMI 48.06 kg/m²     Vital signs reviewed.  Constitutional: Chronically ill-appearing.  Morbidly obese.  BiPAP in place  Head: Normocephalic.   Mouth/Throat: Oropharynx is dry  Eyes: EOM are normal. Pupils are equal, round, and reactive to light.   Neck: Normal range " of motion. Neck supple. No obvious JVD  Cardiovascular: Normal rate, regular rhythm and normal heart sounds.    Pulmonary/Chest: Morbidly obese.  Diffuse wheezing with diminished flow  Abdominal: Soft.  Morbidly obese  Musculoskeletal: 1+ pitting edema with venous stasis changes  Lymphadenopathy: No cervical adenopathy.   Neurological: Patient is alert and oriented to person, place, and time. CNs II - XII intact. DTRs intact. Normal sensation and strength.    Skin: Skin is warm and dry.   Psychiatric: Mild distress initially.  Awake alert.  Limited judgment and insight    LABS  Results for orders placed or performed during the hospital encounter of 04/14/21   CBC WITH DIFFERENTIAL   Result Value Ref Range    WBC 10.1 4.8 - 10.8 K/uL    RBC 4.62 4.20 - 5.40 M/uL    Hemoglobin 12.7 12.0 - 16.0 g/dL    Hematocrit 43.3 37.0 - 47.0 %    MCV 93.7 81.4 - 97.8 fL    MCH 27.5 27.0 - 33.0 pg    MCHC 29.3 (L) 33.6 - 35.0 g/dL    RDW 56.3 (H) 35.9 - 50.0 fL    Platelet Count 237 164 - 446 K/uL    MPV 11.3 9.0 - 12.9 fL    Neutrophils-Polys 76.50 (H) 44.00 - 72.00 %    Lymphocytes 14.80 (L) 22.00 - 41.00 %    Monocytes 6.20 0.00 - 13.40 %    Eosinophils 2.00 0.00 - 6.90 %    Basophils 0.10 0.00 - 1.80 %    Immature Granulocytes 0.40 0.00 - 0.90 %    Nucleated RBC 0.00 /100 WBC    Neutrophils (Absolute) 7.70 (H) 2.00 - 7.15 K/uL    Lymphs (Absolute) 1.49 1.00 - 4.80 K/uL    Monos (Absolute) 0.62 0.00 - 0.85 K/uL    Eos (Absolute) 0.20 0.00 - 0.51 K/uL    Baso (Absolute) 0.01 0.00 - 0.12 K/uL    Immature Granulocytes (abs) 0.04 0.00 - 0.11 K/uL    NRBC (Absolute) 0.00 K/uL   BASIC METABOLIC PANEL   Result Value Ref Range    Sodium 153 (H) 135 - 145 mmol/L    Potassium 4.3 3.6 - 5.5 mmol/L    Chloride 110 96 - 112 mmol/L    Co2 29 20 - 33 mmol/L    Glucose 172 (H) 65 - 99 mg/dL    Bun 9 8 - 22 mg/dL    Creatinine 0.96 0.50 - 1.40 mg/dL    Calcium 9.5 8.5 - 10.5 mg/dL    Anion Gap 14.0 7.0 - 16.0   TROPONIN   Result Value Ref Range     Troponin T 24 (H) 6 - 19 ng/L   proBrain Natriuretic Peptide, NT   Result Value Ref Range    NT-proBNP 201 (H) 0 - 125 pg/mL   COV-2, FLU A/B, AND RSV BY PCR (2-4 HOURS CEPHEID): Collect NP swab in VTM    Specimen: Nasopharyngeal; Respirate   Result Value Ref Range    Influenza virus A RNA Negative Negative    Influenza virus B, PCR Negative Negative    RSV, PCR Negative Negative    SARS-CoV-2 by PCR NotDetected     SARS-CoV-2 Source NP Swab    PERIPHERAL SMEAR REVIEW   Result Value Ref Range    Peripheral Smear Review see below    PLATELET ESTIMATE   Result Value Ref Range    Plt Estimation Normal    MORPHOLOGY   Result Value Ref Range    RBC Morphology Normal    DIFFERENTIAL COMMENT   Result Value Ref Range    Comments-Diff see below    ESTIMATED GFR   Result Value Ref Range    GFR If African American >60 >60 mL/min/1.73 m 2    GFR If Non African American >60 >60 mL/min/1.73 m 2   PT/INR   Result Value Ref Range    PT 12.8 12.0 - 14.6 sec    INR 0.93 0.87 - 1.13   EKG (NOW)   Result Value Ref Range    Report       [facility] Emergency Dept.    Test Date:  2021  Pt Name:    ADELINA MILLAN                Department: ER  MRN:        [id number]                      Room:       [facility]  Gender:     Female                       Technician: [id number]  :        1975                   Requested By:AMANDA FLETCHER  Order #:    [id number]                    Reading MD:    Measurements  Intervals                                Axis  Rate:       81                           P:  MO:                                      QRS:        49  QRSD:       96                           T:          18  QT:         356  QTc:        414    Interpretive Statements  ACCELERATED JUNCTIONAL RHYTHM  Compared to ECG 2021 20:05:41  Accelerated junctional rhythm now present  Sinus rhythm no longer present  Atrial premature complex(es) no longer present  ST (T wave) deviation no longer present       All labs reviewed  by me.    EKG  12 Lead EKG interpreted by me to show sinus rhythm at 80.  Normal P waves.  Normal QRS.  Normal R wave progression.  Normal ST segments.  Normal T waves.  Normal EKG    RADIOLOGY  DX-CHEST-PORTABLE (1 VIEW)   Final Result         1.  Hazy left lung base density suggests infiltrates   2.  Cardiomegaly        The radiologist's interpretation of all radiological studies have been reviewed by me.    COURSE & MEDICAL DECISION MAKING  Pertinent Labs & Imaging studies reviewed. (See chart for details) The patient's [facility] and past medical  records were reviewed    8:30 PM - Patient seen and examined at bedside. Ordered proBrain Natriuretic Peptide, Troponin, BMP, CBC with Differ heart failure, COPD, pneumothorax, COVID-19 ential, CoV-2, Flu A/B, and RSV by PCR, EKG, Blood Culture, Differential Comment, Morphology, Platelet Estimate, DX Chest, and Peripheral Smear Review to evaluate her symptoms. The differential diagnoses include but are not limited to: COVID-19, pneumonia, pneumothorax, COPD, CHF. I will await lab and radiology results before determining whether interventions are necessary. The patient is agreeable and understanding of my plan of care.     Patient is critically ill.   The patient continues to have: Respiratory failure  The vital organ system that is affected is the: Pulmonary  If untreated there is a high chance of deterioration into: Respiratory arrest  And eventually death.   The critical care that I am providing today is: Involving medical management and bipap  The critical that has been undertaken is medically complex.   There has been no overlap in critical care time.   Critical Care Time not including procedures: 40 minutes    Patient is admitted in guarded condition    FINAL IMPRESSION  1. Acute exacerbation of chronic obstructive pulmonary disease (COPD) (HCC)    2. Acute respiratory failure with hypoxia (HCC)          Misael HARRIS (Ade), am scribing for, and in the  presence of, Yanick Olivia M.D..    Electronically signed by: Msiael Ramos (Scribe), 4/14/2021    I, Yanick Olivia M.D. personally performed the services described in this documentation, as scribed by Misael Ramos in my presence, and it is both accurate and complete.    C.     The note accurately reflects work and decisions made by me.  Yanick Olivia M.D.  4/14/2021  11:59 PM

## 2021-04-15 NOTE — PROGRESS NOTES
Pt arrived to [facility] T624. Skin : redness under breast left and right / bruising to lower area of abdomen / redness under panus with excoriation / vaginal area positive  redness / left and right leg at shin area skin red/ shiny intact and painful as stated by pt.

## 2021-04-15 NOTE — CONSULTS
Critical Care Consultation    Date of consult: 4/15/2021    Referring Physician  Yanick Olivia M.D.    Reason for Consultation  Acute on chronic hypoxic respiratory failure    History of Presenting Illness  45 y.o. female history of pickwickian disease, bipolar 2, schizophrenia, COPD, asthma, hypertension who presented 4/14/2021 with worsening chronic shortness of breath over the last 2 days.  Patient had a recent hospital admission for CHF exacerbation and was discharged on 4/3/2021.    After patient called EMS she was started on NIPPV and was found to be hypotensive.  In the ED she was continued on BiPAP and given IV fluids.    I am subsequently consulted for ICU admission and management of acute on chronic hypoxic respiratory failure.    Code Status  Prior    Review of Systems  Review of Systems   Constitutional: Negative for chills and fever.   HENT: Negative for congestion.    Eyes: Negative for photophobia.   Respiratory: Positive for shortness of breath.    Cardiovascular: Negative for chest pain.   Gastrointestinal: Negative for abdominal pain, nausea and vomiting.   Musculoskeletal: Negative for falls.   Skin: Negative for itching.   Neurological: Negative for headaches.   All other systems reviewed and are negative.      Past Medical History   has a past medical history of Asthma, Bipolar 2 disorder (HCC), Chickenpox, Chronic obstructive pulmonary disease (HCC), Hypertension, Other psychological stress, Schizophrenic disorder (HCC), and Yeast infection of the skin (4/1/2021). She also has no past medical history of CAD (coronary artery disease), Cancer (HCC), Congestive heart failure (HCC), Diabetes, Liver disease, Renal disorder, Seizure disorder (HCC), or Stroke (HCC).    Surgical History   has a past surgical history that includes hysterectomy laparoscopy; colectomy; cholecystectomy; and knee arthroscopy (Left).    Family History  family history includes Cancer in her sister; No Known Problems in her  mother.    Social History   reports that she quit smoking about 3 months ago. Her smoking use included cigarettes. She has a 20.00 pack-year smoking history. She has never used smokeless tobacco. She reports that she does not drink alcohol and does not use drugs.    Medications  Home Medications     Reviewed by Joe Murphy, PhT (Pharmacy Tech) on 04/14/21 at 2116  Med List Status: Complete   Medication Last Dose Status   albuterol 108 (90 Base) MCG/ACT Aero Soln inhalation aerosol 4/14/2021 Active   aripiprazole (ABILIFY) 30 MG tablet 4/14/2021 Active   atorvastatin (LIPITOR) 20 MG Tab Not Taking Active   fluticasone (FLONASE) 50 MCG/ACT nasal spray 4/14/2021 Active   furosemide (LASIX) 20 MG Tab 4/14/2021 Active   lithium CR (LITHOBID) 300 MG Tab CR 4/14/2021 Active   montelukast (SINGULAIR) 10 MG Tab 4/13/2021 Active   nystatin (MYCOSTATIN) powder 4/12/2021 Active   potassium chloride SA (KDUR) 20 MEQ Tab CR 4/14/2021 Active   tiotropium (SPIRIVA HANDIHALER) 18 MCG Cap 4/14/2021 Active   warfarin (COUMADIN) 6 MG Tab 4/14/2021 Active              Current Facility-Administered Medications   Medication Dose Route Frequency Provider Last Rate Last Admin   • albuterol (PROVENTIL) 2.5 mg/0.5 mL nebulizer solution 10 mg  10 mg Nebulization Continuous Yanick Olivia M.D.   Stopped at 04/14/21 5188     Current Outpatient Medications   Medication Sig Dispense Refill   • acetaminophen (TYLENOL) 500 MG Tab Take 1,000 mg by mouth one time.     • furosemide (LASIX) 20 MG Tab Take 1 tablet by mouth 2 times a day. 60 tablet 3   • potassium chloride SA (KDUR) 20 MEQ Tab CR Take 1 tablet by mouth every day. 30 tablet 3   • warfarin (COUMADIN) 6 MG Tab Take 1 tab by mouth daily or as directed by anticoagulation clinic (Patient taking differently: see administration instructions. Reports that she was told to take 1 1/2 tablets for two days then start with 1 tablet until the next INR.  04/14/21-9 mg  04/15/21-9  mg  04/16/21-6 mg until next INR) 90 tablet 1   • fluticasone (FLONASE) 50 MCG/ACT nasal spray Administer 1 Spray into affected nostril(S) 2 times a day.     • albuterol 108 (90 Base) MCG/ACT Aero Soln inhalation aerosol Inhale 2 Puffs every 6 hours as needed for Shortness of Breath. 18 g 0   • nystatin (MYCOSTATIN) powder Apply underneath breasts and on inner thigh area twice a day for fungal infection (Patient taking differently: Apply 1 Application topically 2 times a day as needed (fungal infection).) 60 g 0   • montelukast (SINGULAIR) 10 MG Tab Take 1 Tab by mouth every bedtime. 30 Tab 11   • atorvastatin (LIPITOR) 20 MG Tab Take 1 Tab by mouth every day. (Patient not taking: Reported on 4/14/2021) 30 Tab 1   • tiotropium (SPIRIVA HANDIHALER) 18 MCG Cap Inhale 1 Cap by mouth every day. 30 Cap 3   • lithium CR (LITHOBID) 300 MG Tab CR Take 300-600 mg by mouth 2 Times a Day. 1 caps = 300 mg every morning  2 caps = 600 mg every evening     • aripiprazole (ABILIFY) 30 MG tablet Take 30 mg by mouth every morning.         Allergies  Allergies   Allergen Reactions   • Amoxicillin Rash and Itching     Tolerates cephalosporins, pt reports that she gets a rash all over her body and gets itchy   • Penicillin G Rash and Itching     pt reports that she gets a rash all over her body and gets itchy       Vital Signs last 24 hours  Temp:  [36.9 °C (98.5 °F)] 36.9 °C (98.5 °F)  Pulse:  [68-97] 97  Resp:  [20-36] 25  BP: (130-166)/(59-84) 166/67  SpO2:  [98 %-100 %] 98 %    Physical Exam  Physical Exam  Vitals and nursing note reviewed.   Constitutional:       General: She is in acute distress.      Appearance: She is obese. She is ill-appearing and toxic-appearing.   HENT:      Head: Normocephalic and atraumatic.      Right Ear: External ear normal.      Left Ear: External ear normal.      Nose: Nose normal.      Mouth/Throat:      Mouth: Mucous membranes are moist.      Pharynx: Oropharynx is clear.   Eyes:      Extraocular  Movements: Extraocular movements intact.      Pupils: Pupils are equal, round, and reactive to light.   Cardiovascular:      Rate and Rhythm: Normal rate and regular rhythm.      Pulses: Normal pulses.      Heart sounds: Normal heart sounds.   Pulmonary:      Effort: Respiratory distress present.      Comments: Tachypnea  Coarse bilateral breath sounds with decreased air movement  Abdominal:      Palpations: Abdomen is soft.   Musculoskeletal:         General: Normal range of motion.      Cervical back: Normal range of motion and neck supple.      Right lower leg: Edema present.      Left lower leg: Edema present.   Skin:     General: Skin is warm and dry.      Capillary Refill: Capillary refill takes 2 to 3 seconds.   Neurological:      General: No focal deficit present.      Mental Status: Mental status is at baseline.         Fluids  No intake or output data in the 24 hours ending 04/15/21 0016    Laboratory  Recent Results (from the past 48 hour(s))   POCT Protime    Collection Time: 04/14/21 12:00 AM   Result Value Ref Range    INR 1.30 (A) 2 - 3.5   CBC WITH DIFFERENTIAL    Collection Time: 04/14/21  8:38 PM   Result Value Ref Range    WBC 10.1 4.8 - 10.8 K/uL    RBC 4.62 4.20 - 5.40 M/uL    Hemoglobin 12.7 12.0 - 16.0 g/dL    Hematocrit 43.3 37.0 - 47.0 %    MCV 93.7 81.4 - 97.8 fL    MCH 27.5 27.0 - 33.0 pg    MCHC 29.3 (L) 33.6 - 35.0 g/dL    RDW 56.3 (H) 35.9 - 50.0 fL    Platelet Count 237 164 - 446 K/uL    MPV 11.3 9.0 - 12.9 fL    Neutrophils-Polys 76.50 (H) 44.00 - 72.00 %    Lymphocytes 14.80 (L) 22.00 - 41.00 %    Monocytes 6.20 0.00 - 13.40 %    Eosinophils 2.00 0.00 - 6.90 %    Basophils 0.10 0.00 - 1.80 %    Immature Granulocytes 0.40 0.00 - 0.90 %    Nucleated RBC 0.00 /100 WBC    Neutrophils (Absolute) 7.70 (H) 2.00 - 7.15 K/uL    Lymphs (Absolute) 1.49 1.00 - 4.80 K/uL    Monos (Absolute) 0.62 0.00 - 0.85 K/uL    Eos (Absolute) 0.20 0.00 - 0.51 K/uL    Baso (Absolute) 0.01 0.00 - 0.12 K/uL     Immature Granulocytes (abs) 0.04 0.00 - 0.11 K/uL    NRBC (Absolute) 0.00 K/uL   BASIC METABOLIC PANEL    Collection Time: 21  8:38 PM   Result Value Ref Range    Sodium 153 (H) 135 - 145 mmol/L    Potassium 4.3 3.6 - 5.5 mmol/L    Chloride 110 96 - 112 mmol/L    Co2 29 20 - 33 mmol/L    Glucose 172 (H) 65 - 99 mg/dL    Bun 9 8 - 22 mg/dL    Creatinine 0.96 0.50 - 1.40 mg/dL    Calcium 9.5 8.5 - 10.5 mg/dL    Anion Gap 14.0 7.0 - 16.0   TROPONIN    Collection Time: 21  8:38 PM   Result Value Ref Range    Troponin T 24 (H) 6 - 19 ng/L   proBrain Natriuretic Peptide, NT    Collection Time: 21  8:38 PM   Result Value Ref Range    NT-proBNP 201 (H) 0 - 125 pg/mL   PERIPHERAL SMEAR REVIEW    Collection Time: 21  8:38 PM   Result Value Ref Range    Peripheral Smear Review see below    PLATELET ESTIMATE    Collection Time: 21  8:38 PM   Result Value Ref Range    Plt Estimation Normal    MORPHOLOGY    Collection Time: 21  8:38 PM   Result Value Ref Range    RBC Morphology Normal    DIFFERENTIAL COMMENT    Collection Time: 21  8:38 PM   Result Value Ref Range    Comments-Diff see below    ESTIMATED GFR    Collection Time: 21  8:38 PM   Result Value Ref Range    GFR If African American >60 >60 mL/min/1.73 m 2    GFR If Non African American >60 >60 mL/min/1.73 m 2   PT/INR    Collection Time: 21  8:38 PM   Result Value Ref Range    PT 12.8 12.0 - 14.6 sec    INR 0.93 0.87 - 1.13   EKG (NOW)    Collection Time: 21  8:38 PM   Result Value Ref Range    Report       [facility] Emergency Dept.    Test Date:  2021  Pt Name:    ADELINA IMLLAN                Department: ER  MRN:        [id number]                      Room:       [facility]  Gender:     Female                       Technician: [id number]  :        1975                   Requested By:AMANDA FLETCHER  Order #:    [id number]                    Shar MD:    Measurements  Intervals                                 Axis  Rate:       81                           P:  WV:                                      QRS:        49  QRSD:       96                           T:          18  QT:         356  QTc:        414    Interpretive Statements  ACCELERATED JUNCTIONAL RHYTHM  Compared to ECG 04/01/2021 20:05:41  Accelerated junctional rhythm now present  Sinus rhythm no longer present  Atrial premature complex(es) no longer present  ST (T wave) deviation no longer present     COV-2, FLU A/B, AND RSV BY PCR (2-4 HOURS CEPHEID): Collect NP swab in VT    Collection Time: 04/14/21  8:42 PM    Specimen: Nasopharyngeal; Respirate   Result Value Ref Range    Influenza virus A RNA Negative Negative    Influenza virus B, PCR Negative Negative    RSV, PCR Negative Negative    SARS-CoV-2 by PCR NotDetected     SARS-CoV-2 Source NP Swab        Imaging  DX-CHEST-PORTABLE (1 VIEW)   Final Result         1.  Hazy left lung base density suggests infiltrates   2.  Cardiomegaly          Assessment/Plan  * Acute respiratory failure (HCC)- (present on admission)  Assessment & Plan  Acute on chronic hypoxic respiratory failure  Ventilator dependent respiratory failure  Modify BiPAP to optimize oxygenation and ventilation  HOB > 30  Early mobility  CXR: no discreet infiltrate and this does not appear to be infectious;  w/ no obvious signs of heart failure    KATHIA and COPD overlap syndrome (HCC)- (present on admission)  Assessment & Plan  NIPPV  Bronchodilators  Continue home medications  Needs placement in long term care facility    Class 3 severe obesity due to excess calories with body mass index (BMI) of 50.0 to 59.9 in adult (HCC)- (present on admission)  Assessment & Plan  Contributing to respiratory failure    DVT prophylaxis: Enoxaparin  PUD prophylaxis: Not indicated  Glycemic control: Sliding scale insulin  Nutrition: Start diabetic cardiac diet in AM  Lines: None  Henao: Need for strict I/O monitoring, remove when  able  Mobility: Early mobility as tolerated  Goals of care: Full code  Disposition: ICU    Discussed patient condition and risk of morbidity and/or mortality with RN, RT, Pharmacy, Code status disscussed, Charge nurse / hot rounds, Patient and ERP.    The patient remains critically ill.  Critical care time = 43 minutes in directly providing and coordinating critical care and extensive data review.  No time overlap and excludes procedures.

## 2021-04-15 NOTE — CARE PLAN
Problem: Oxygenation:  Goal: Maintain adequate oxygenation dependent on patient condition  Outcome: PROGRESSING AS EXPECTED    Pt on 5L oxymask while awake.    BiPAP 10/6, 40% Fio2 while asleep  Duoneb Q4, Pulmicort BID

## 2021-04-15 NOTE — ASSESSMENT & PLAN NOTE
Acute on chronic hypoxic respiratory failure  Ventilator dependent respiratory failure  Modify BiPAP to optimize oxygenation and ventilation  HOB > 30  Early mobility  CXR: no discreet infiltrate and this does not appear to be infectious;  w/ no obvious signs of heart failure

## 2021-04-15 NOTE — H&P
Hospital Medicine History & Physical Note    Date of Service  4/15/2021    Primary Care Physician  BELÉN Gleason.    Consultants  Dr. Parish, ICU    Code Status  Full Code    Chief Complaint  Chief Complaint   Patient presents with   • Shortness of Breath       History of Presenting Illness  45 y.o. morbidly obese female (BMI 48) with past medical hx of COPD, asthma, bipolar 2, schizophrenia, CHF, multiple recent admissions for acute respiratory failure who presented 4/14/2021 with  for evaluation of worsening, chronic shortness of breath worsening for the last two days. She was experiencing shortness of breath when she decided to call EMS. She had BiPAP placed by  [facility] and upon arrival she was hypotensive. She was admitted into the hospital on 04/01/21 and discharged on 04/03/21 for CHF exacerbation.     Notable findings include sodium 153, glucose 172, , troponin 24, /67, RR, 39, required BiPAP in the ED.      Chest x-ray showing:Hazy left lung base density suggests infiltrates 2. Cardiomegaly     Patient is admitted to the ICU with Dr. Parish, intensivist.     Review of Systems  Review of Systems   Respiratory: Positive for shortness of breath and wheezing.    All other systems reviewed and are negative.      Past Medical History   has a past medical history of Asthma, Bipolar 2 disorder (HCC), Chickenpox, Chronic obstructive pulmonary disease (HCC), Hypertension, Other psychological stress, Schizophrenic disorder (HCC), and Yeast infection of the skin (4/1/2021).    Surgical History   has a past surgical history that includes hysterectomy laparoscopy; colectomy; cholecystectomy; and knee arthroscopy (Left).     Family History  family history includes Cancer in her sister; No Known Problems in her mother.     Social History   reports that she quit smoking about 3 months ago. Her smoking use included cigarettes. She has a 20.00 pack-year smoking history. She has never used smokeless  tobacco. She reports that she does not drink alcohol and does not use drugs.    Allergies  Allergies   Allergen Reactions   • Amoxicillin Rash and Itching     Tolerates cephalosporins, pt reports that she gets a rash all over her body and gets itchy   • Penicillin G Rash and Itching     pt reports that she gets a rash all over her body and gets itchy       Medications  Prior to Admission Medications   Prescriptions Last Dose Informant Patient Reported? Taking?   acetaminophen (TYLENOL) 500 MG Tab 4/14/2021 at [facility] Patient Yes Yes   Sig: Take 1,000 mg by mouth one time.   albuterol 108 (90 Base) MCG/ACT Aero Soln inhalation aerosol 4/14/2021 at [facility] Patient No No   Sig: Inhale 2 Puffs every 6 hours as needed for Shortness of Breath.   aripiprazole (ABILIFY) 30 MG tablet 4/14/2021 at AM Patient Yes No   Sig: Take 30 mg by mouth every morning.   atorvastatin (LIPITOR) 20 MG Tab Not Taking at Not Taking Patient No No   Sig: Take 1 Tab by mouth every day.   Patient not taking: Reported on 4/14/2021   fluticasone (FLONASE) 50 MCG/ACT nasal spray 4/14/2021 at [facility] Patient Yes No   Sig: Administer 1 Spray into affected nostril(S) 2 times a day.   furosemide (LASIX) 20 MG Tab 4/14/2021 at AM Patient No No   Sig: Take 1 tablet by mouth 2 times a day.   lithium CR (LITHOBID) 300 MG Tab CR 4/14/2021 at AM Patient Yes No   Sig: Take 300-600 mg by mouth 2 Times a Day. 1 caps = 300 mg every morning  2 caps = 600 mg every evening   montelukast (SINGULAIR) 10 MG Tab 4/13/2021 at [facility] Patient No No   Sig: Take 1 Tab by mouth every bedtime.   nystatin (MYCOSTATIN) powder 4/12/2021 at [facility] Patient No No   Sig: Apply underneath breasts and on inner thigh area twice a day for fungal infection   Patient taking differently: Apply 1 Application topically 2 times a day as needed (fungal infection).   potassium chloride SA (KDUR) 20 MEQ Tab CR 4/14/2021 at AM Patient No No   Sig: Take 1 tablet by mouth every day.   tiotropium (SPIRIVA HANDIHALER)  18 MCG Cap 4/14/2021 at AM Patient No No   Sig: Inhale 1 Cap by mouth every day.   warfarin (COUMADIN) 6 MG Tab 4/14/2021 at lunch time Patient No No   Sig: Take 1 tab by mouth daily or as directed by anticoagulation clinic   Patient taking differently: see administration instructions. Reports that she was told to take 1 1/2 tablets for two days then start with 1 tablet until the next INR.  04/14/21-9 mg  04/15/21-9 mg  04/16/21-6 mg until next INR      Facility-Administered Medications: None       Physical Exam  Temp:  [36.1 °C (97 °F)-36.9 °C (98.5 °F)] 36.1 °C (97 °F)  Pulse:  [68-97] 87  Resp:  [17-45] 20  BP: (106-166)/(46-84) 133/55  SpO2:  [93 %-100 %] 94 %    Physical Exam     Constitutional: appears ill,  in acute respiratory distress  HENT: Normocephalic, no obvious evidence of acute trauma.  Eyes: No scleral icterus. Normal conjunctiva   Neck: Comfortable movement without any obvious restriction in the range of motion.  Cardiovascular: Upon ascultation I appreciate a regular heart rhythm and a normal rate with no murmurs, rubs or gallops  Thorax & Lungs: acute respiratory distress. Coarse breath sounds in all lung fields.   there is no obvious chest wall tenderness.  Abdomen: The abdomen is not visibly distended. Upon palpation, I find it to be without tenderness.  No mass appreciated.  Skin: erythema with edema at b/l LE at anterior shins, TTP  Extremities/Musculoskeletal: b/l  lower extremity edema with no asymmetry.  Neurologic: unable to evaluate do to acute respiratory distress requiring BiPAP, shakes head no when asked if she has any pain, opens eyes to name  Psychiatric: uable to evaluate currently    Laboratory:  Recent Labs     04/14/21 2038   WBC 10.1   RBC 4.62   HEMOGLOBIN 12.7   HEMATOCRIT 43.3   MCV 93.7   MCH 27.5   MCHC 29.3*   RDW 56.3*   PLATELETCT 237   MPV 11.3     Recent Labs     04/14/21 2038   SODIUM 153*   POTASSIUM 4.3   CHLORIDE 110   CO2 29   GLUCOSE 172*   BUN 9   CREATININE  0.96   CALCIUM 9.5     Recent Labs     04/14/21 2038   GLUCOSE 172*     Recent Labs     04/14/21  0000 04/14/21 2038   INR 1.30* 0.93     Recent Labs     04/14/21 2038   NTPROBNP 201*         Recent Labs     04/14/21 2038   TROPONINT 24*       Imaging:  DX-CHEST-PORTABLE (1 VIEW)   Final Result         1.  Hazy left lung base density suggests infiltrates   2.  Cardiomegaly            Assessment/Plan:  I anticipate this patient will require at least two midnights for appropriate medical management, necessitating inpatient admission.         * Acute respiratory failure (HCC)- (present on admission)  Assessment & Plan  Acute on chronic hypoxic respiratory failure  Ventilator dependent respiratory failure  Modify BiPAP to optimize oxygenation and ventilation  HOB > 30  Early mobility  CXR: no discreet infiltrate and this does not appear to be infectious;  w/ no obvious signs of heart failure    KATHIA and COPD overlap syndrome ([facility])- (present on admission)  Assessment & Plan  NIPPV  Bronchodilators  Continue home medications  Needs placement in long term care facility    DM2 (diabetes mellitus, type 2) ([facility])- (present on admission)  Assessment & Plan  Glucose 172 on arrival  ISS  accuchecks  A1C ordered to assess    Pulmonary hypertension ([facility])- (present on admission)  Assessment & Plan  Continue home medications  Admitted with telemetry  Antihypertensives prn    Debility- (present on admission)  Assessment & Plan  SW consult  Unable to perform ADL's without assistance    Class 3 severe obesity due to excess calories with body mass index (BMI) of 50.0 to 59.9 in adult ([facility])- (present on admission)  Assessment & Plan  Contributing to respiratory failure  Consider nutrition consult

## 2021-04-15 NOTE — PROGRESS NOTES
Inpatient Anticoagulation Service Note    Date: 4/15/2021    Reason for Anticoagulation: Atrial Fibrillation   Target INR: 2.0 to 3.0  [id number] VASc Score: 2  HAS-BLED Score: 1   Hemoglobin Value: 12.7  Hematocrit Value: 43.3  Lab Platelet Value: 237    INR from last 7 days     Date/Time INR Value    04/14/21 2038  0.93        Dose from last 7 days     Date/Time Dose (mg)    04/15/21 1358  9        Significant Interactions: Not Applicable  Bridge Therapy: No  Reversal Agent Administered: Not Applicable    Comments: Home warfarin to be resumed for afib. H/H wnl with no overt bleeding noted by nursing or providers. No significant DDI identified. Regular diet ordered and patient consumed % of meal. INR currently sub-therapeutic at 0.93. Patient recently started anticoagulation for afib on 3/21/21. Patient achieved a therapeutic INR x 3 while on warfarin 6 mg daily. Given low INR, Plan to bolus patient x 1 dose of 9 mg then resume home dosing of 6 mg daily.    Plan:  Warfarin 9 mg x 1 dose, repeat INR in AM  Education Material Provided?: No(chronic)  Pharmacist suggested discharge dosing: home dosing of 6 mg daily. Follow up INR within 48-72 hours of discharge.     Tejinder Wright, PharmD

## 2021-04-15 NOTE — CARE PLAN
Problem: Oxygenation:  Goal: Maintain adequate oxygenation dependent on patient condition  Outcome: PROGRESSING AS EXPECTED   BiPAP 10/6, 40% FIo2  Duoneb Q4, Pulmicort BID

## 2021-04-15 NOTE — ED TRIAGE NOTES
Chief Complaint   Patient presents with   • Shortness of Breath     Patient bib remsa for increasing sob today. Patient discharged recently for COPD+HF exacerbation. Patient arrives on cpap. ERP at bedside on arrival.

## 2021-04-16 LAB
ANION GAP SERPL CALC-SCNC: 4 MMOL/L (ref 7–16)
BUN SERPL-MCNC: 19 MG/DL (ref 8–22)
CALCIUM SERPL-MCNC: 9.3 MG/DL (ref 8.5–10.5)
CHLORIDE SERPL-SCNC: 103 MMOL/L (ref 96–112)
CO2 SERPL-SCNC: 34 MMOL/L (ref 20–33)
CREAT SERPL-MCNC: 0.9 MG/DL (ref 0.5–1.4)
ERYTHROCYTE [DISTWIDTH] IN BLOOD BY AUTOMATED COUNT: 54.7 FL (ref 35.9–50)
GLUCOSE SERPL-MCNC: 142 MG/DL (ref 65–99)
HCT VFR BLD AUTO: 37.6 % (ref 37–47)
HGB BLD-MCNC: 11.3 G/DL (ref 12–16)
INR BLD: 1.3 (ref 0.9–1.2)
MAGNESIUM SERPL-MCNC: 2.5 MG/DL (ref 1.5–2.5)
MCH RBC QN AUTO: 27.7 PG (ref 27–33)
MCHC RBC AUTO-ENTMCNC: 30.1 G/DL (ref 33.6–35)
MCV RBC AUTO: 92.2 FL (ref 81.4–97.8)
NT-PROBNP SERPL IA-MCNC: 348 PG/ML (ref 0–125)
PHOSPHATE SERPL-MCNC: 3 MG/DL (ref 2.5–4.5)
PLATELET # BLD AUTO: 217 K/UL (ref 164–446)
PMV BLD AUTO: 11.2 FL (ref 9–12.9)
POTASSIUM SERPL-SCNC: 4.2 MMOL/L (ref 3.6–5.5)
RBC # BLD AUTO: 4.08 M/UL (ref 4.2–5.4)
SODIUM SERPL-SCNC: 141 MMOL/L (ref 135–145)
WBC # BLD AUTO: 10.2 K/UL (ref 4.8–10.8)

## 2021-04-16 PROCEDURE — 770020 HCHG ROOM/CARE - TELE (206)

## 2021-04-16 PROCEDURE — 94664 DEMO&/EVAL PT USE INHALER: CPT

## 2021-04-16 PROCEDURE — 700102 HCHG RX REV CODE 250 W/ 637 OVERRIDE(OP): Performed by: HOSPITALIST

## 2021-04-16 PROCEDURE — 94640 AIRWAY INHALATION TREATMENT: CPT

## 2021-04-16 PROCEDURE — 80048 BASIC METABOLIC PNL TOTAL CA: CPT

## 2021-04-16 PROCEDURE — A9270 NON-COVERED ITEM OR SERVICE: HCPCS | Performed by: INTERNAL MEDICINE

## 2021-04-16 PROCEDURE — 83735 ASSAY OF MAGNESIUM: CPT

## 2021-04-16 PROCEDURE — 700111 HCHG RX REV CODE 636 W/ 250 OVERRIDE (IP): Performed by: EMERGENCY MEDICINE

## 2021-04-16 PROCEDURE — 94660 CPAP INITIATION&MGMT: CPT

## 2021-04-16 PROCEDURE — 700102 HCHG RX REV CODE 250 W/ 637 OVERRIDE(OP): Performed by: INTERNAL MEDICINE

## 2021-04-16 PROCEDURE — 99406 BEHAV CHNG SMOKING 3-10 MIN: CPT

## 2021-04-16 PROCEDURE — 99233 SBSQ HOSP IP/OBS HIGH 50: CPT | Performed by: HOSPITALIST

## 2021-04-16 PROCEDURE — 700111 HCHG RX REV CODE 636 W/ 250 OVERRIDE (IP): Performed by: INTERNAL MEDICINE

## 2021-04-16 PROCEDURE — 94760 N-INVAS EAR/PLS OXIMETRY 1: CPT

## 2021-04-16 PROCEDURE — 700101 HCHG RX REV CODE 250: Performed by: EMERGENCY MEDICINE

## 2021-04-16 PROCEDURE — A9270 NON-COVERED ITEM OR SERVICE: HCPCS | Performed by: HOSPITALIST

## 2021-04-16 PROCEDURE — 85027 COMPLETE CBC AUTOMATED: CPT

## 2021-04-16 PROCEDURE — 84100 ASSAY OF PHOSPHORUS: CPT

## 2021-04-16 PROCEDURE — 83880 ASSAY OF NATRIURETIC PEPTIDE: CPT

## 2021-04-16 PROCEDURE — 94669 MECHANICAL CHEST WALL OSCILL: CPT

## 2021-04-16 PROCEDURE — 5A09357 ASSISTANCE WITH RESPIRATORY VENTILATION, LESS THAN 24 CONSECUTIVE HOURS, CONTINUOUS POSITIVE AIRWAY PRESSURE: ICD-10-PCS | Performed by: INTERNAL MEDICINE

## 2021-04-16 RX ORDER — AMOXICILLIN 250 MG
2 CAPSULE ORAL 2 TIMES DAILY
Status: DISCONTINUED | OUTPATIENT
Start: 2021-04-16 | End: 2021-04-20 | Stop reason: HOSPADM

## 2021-04-16 RX ORDER — POLYETHYLENE GLYCOL 3350 17 G/17G
1 POWDER, FOR SOLUTION ORAL
Status: DISCONTINUED | OUTPATIENT
Start: 2021-04-16 | End: 2021-04-20 | Stop reason: HOSPADM

## 2021-04-16 RX ORDER — FUROSEMIDE 20 MG/1
20 TABLET ORAL
Status: DISCONTINUED | OUTPATIENT
Start: 2021-04-17 | End: 2021-04-20 | Stop reason: HOSPADM

## 2021-04-16 RX ORDER — BISACODYL 10 MG
10 SUPPOSITORY, RECTAL RECTAL
Status: DISCONTINUED | OUTPATIENT
Start: 2021-04-16 | End: 2021-04-20 | Stop reason: HOSPADM

## 2021-04-16 RX ORDER — IPRATROPIUM BROMIDE AND ALBUTEROL SULFATE 2.5; .5 MG/3ML; MG/3ML
3 SOLUTION RESPIRATORY (INHALATION)
Status: DISCONTINUED | OUTPATIENT
Start: 2021-04-16 | End: 2021-04-20 | Stop reason: HOSPADM

## 2021-04-16 RX ORDER — METOPROLOL SUCCINATE 25 MG/1
25 TABLET, EXTENDED RELEASE ORAL
Status: DISCONTINUED | OUTPATIENT
Start: 2021-04-16 | End: 2021-04-20 | Stop reason: HOSPADM

## 2021-04-16 RX ADMIN — IPRATROPIUM BROMIDE AND ALBUTEROL SULFATE 3 ML: .5; 2.5 SOLUTION RESPIRATORY (INHALATION) at 06:38

## 2021-04-16 RX ADMIN — IPRATROPIUM BROMIDE AND ALBUTEROL SULFATE 3 ML: .5; 2.5 SOLUTION RESPIRATORY (INHALATION) at 10:18

## 2021-04-16 RX ADMIN — IPRATROPIUM BROMIDE AND ALBUTEROL SULFATE 3 ML: .5; 2.5 SOLUTION RESPIRATORY (INHALATION) at 03:15

## 2021-04-16 RX ADMIN — FLUTICASONE PROPIONATE 50 MCG: 50 SPRAY, METERED NASAL at 16:48

## 2021-04-16 RX ADMIN — FLUTICASONE PROPIONATE 50 MCG: 50 SPRAY, METERED NASAL at 06:30

## 2021-04-16 RX ADMIN — WARFARIN SODIUM 6 MG: 6 TABLET ORAL at 16:47

## 2021-04-16 RX ADMIN — ARIPIPRAZOLE 30 MG: 15 TABLET ORAL at 06:31

## 2021-04-16 RX ADMIN — BUDESONIDE 0.5 MG: 0.5 SUSPENSION RESPIRATORY (INHALATION) at 06:37

## 2021-04-16 RX ADMIN — LITHIUM CARBONATE 300 MG: 300 TABLET, FILM COATED, EXTENDED RELEASE ORAL at 06:31

## 2021-04-16 RX ADMIN — FUROSEMIDE 20 MG: 10 INJECTION, SOLUTION INTRAMUSCULAR; INTRAVENOUS at 06:30

## 2021-04-16 RX ADMIN — MONTELUKAST 10 MG: 10 TABLET, FILM COATED ORAL at 20:36

## 2021-04-16 RX ADMIN — DOCUSATE SODIUM 50 MG AND SENNOSIDES 8.6 MG 2 TABLET: 8.6; 5 TABLET, FILM COATED ORAL at 16:47

## 2021-04-16 RX ADMIN — BUDESONIDE 0.5 MG: 0.5 SUSPENSION RESPIRATORY (INHALATION) at 23:12

## 2021-04-16 RX ADMIN — ATORVASTATIN CALCIUM 20 MG: 20 TABLET, FILM COATED ORAL at 16:46

## 2021-04-16 RX ADMIN — DOCUSATE SODIUM 50 MG AND SENNOSIDES 8.6 MG 2 TABLET: 8.6; 5 TABLET, FILM COATED ORAL at 11:14

## 2021-04-16 RX ADMIN — LITHIUM CARBONATE 600 MG: 300 TABLET, FILM COATED, EXTENDED RELEASE ORAL at 16:47

## 2021-04-16 RX ADMIN — PREDNISONE 40 MG: 20 TABLET ORAL at 06:31

## 2021-04-16 RX ADMIN — MONTELUKAST 10 MG: 10 TABLET, FILM COATED ORAL at 01:15

## 2021-04-16 RX ADMIN — IPRATROPIUM BROMIDE AND ALBUTEROL SULFATE 3 ML: .5; 2.5 SOLUTION RESPIRATORY (INHALATION) at 00:58

## 2021-04-16 RX ADMIN — METOPROLOL SUCCINATE 25 MG: 25 TABLET, EXTENDED RELEASE ORAL at 12:42

## 2021-04-16 RX ADMIN — TIOTROPIUM BROMIDE INHALATION SPRAY 5 MCG: 3.12 SPRAY, METERED RESPIRATORY (INHALATION) at 06:38

## 2021-04-16 ASSESSMENT — ENCOUNTER SYMPTOMS
HEADACHES: 0
MUSCULOSKELETAL NEGATIVE: 1
FEVER: 0
NECK PAIN: 0
BRUISES/BLEEDS EASILY: 0
NAUSEA: 0
POLYDIPSIA: 0
SHORTNESS OF BREATH: 1
WEAKNESS: 0
BACK PAIN: 0
PALPITATIONS: 0
CHILLS: 0
NERVOUS/ANXIOUS: 1
FOCAL WEAKNESS: 0
COUGH: 0
VOMITING: 0
DEPRESSION: 0
GASTROINTESTINAL NEGATIVE: 1
EYES NEGATIVE: 1
HEARTBURN: 0
DIZZINESS: 1

## 2021-04-16 ASSESSMENT — COGNITIVE AND FUNCTIONAL STATUS - GENERAL
DAILY ACTIVITIY SCORE: 22
SUGGESTED CMS G CODE MODIFIER MOBILITY: CK
MOVING FROM LYING ON BACK TO SITTING ON SIDE OF FLAT BED: A LITTLE
STANDING UP FROM CHAIR USING ARMS: A LITTLE
WALKING IN HOSPITAL ROOM: A LITTLE
HELP NEEDED FOR BATHING: A LITTLE
DRESSING REGULAR LOWER BODY CLOTHING: A LITTLE
CLIMB 3 TO 5 STEPS WITH RAILING: A LITTLE
MOVING TO AND FROM BED TO CHAIR: A LITTLE
MOBILITY SCORE: 19
SUGGESTED CMS G CODE MODIFIER DAILY ACTIVITY: CJ

## 2021-04-16 ASSESSMENT — PULMONARY FUNCTION TESTS
EPAP_CMH2O: 6
EPAP_CMH2O: 6

## 2021-04-16 ASSESSMENT — PATIENT HEALTH QUESTIONNAIRE - PHQ9
1. LITTLE INTEREST OR PLEASURE IN DOING THINGS: NOT AT ALL
2. FEELING DOWN, DEPRESSED, IRRITABLE, OR HOPELESS: NOT AT ALL
SUM OF ALL RESPONSES TO PHQ9 QUESTIONS 1 AND 2: 0

## 2021-04-16 ASSESSMENT — FIBROSIS 4 INDEX: FIB4 SCORE: 0.24

## 2021-04-16 ASSESSMENT — LIFESTYLE VARIABLES: PACK_YEARS: 20

## 2021-04-16 ASSESSMENT — PAIN DESCRIPTION - PAIN TYPE: TYPE: ACUTE PAIN

## 2021-04-16 NOTE — PROGRESS NOTES
Hospital Medicine Daily Progress Note    Date of Service  4/16/2021    Chief Complaint  45 y.o. female admitted 4/14/2021 with dyspnea    Hospital Course  This is a 45-year-old female with a history of bipolar disease, schizophrenia, COPD, obstructive sleep apnea with home CPAP, home oxygen, recent development of atrial fibrillation, apparently paroxysmal, history of asthma, living alone with a caretaker apparently presenting now repetitively to the hospital with dyspnea, the patient was treated with BiPAP and brought to the hospital where she initially found to be somewhat hypertensive, the patient was admitted to ICU, she was on BiPAP transiently, and converted to oxygen mask, she received treatment for COPD and her obstructive sleep apnea, her antipsychotic medication was restarted, her warfarin was continued for atrial fibrillation although she is currently in a sinus rhythm.  The patient had a recent echocardiogram showing a normal ventricular function at 60%, there was normal diastolic function, and no evidence of pulmonary hypertension.  It is unclear if the patient can manage her a rather complicated medical treatment at home by herself.    Interval Problem Update  Patient seen and examined today. ICU Care  Care and plan discussed in IDT/Hot rounds.  Lines and assistive devices reviewed.    Patient tolerating treatment and therapies.  All Data, Medication data reviewed.  Case discussed with nursing as available.  Plan of Care reviewed with patient and notified of changes.  4/16 the patient is afebrile, her heart rate is in the 60s, she is breathing fairly unlabored and this 16 range she is on 3 L of oxygen, her blood pressure is 127/68, she feels slightly dizzy and states that she cannot remember exactly what happened yesterday.  Her laboratory data is remarkable for a slight anemia with a hemoglobin of 11.3, glucose level 142, her BMP slightly elevated at 348, chest x-ray with hazy left lung base densities  suggesting infiltrates and cardiomegaly  Consultants/Specialty  Intensivist  Palliative care  Code Status  Full Code    Disposition  To be determined, the patient has had frequent readmissions    Review of Systems  Review of Systems   Constitutional: Positive for malaise/fatigue. Negative for chills and fever.   HENT: Negative.    Eyes: Negative.    Respiratory: Positive for shortness of breath. Negative for cough.    Cardiovascular: Positive for leg swelling. Negative for chest pain and palpitations.   Gastrointestinal: Negative.  Negative for heartburn, nausea and vomiting.   Genitourinary: Negative.  Negative for dysuria and frequency.   Musculoskeletal: Negative.  Negative for back pain and neck pain.   Skin: Negative.  Negative for itching and rash.   Neurological: Positive for dizziness. Negative for focal weakness, weakness and headaches.   Endo/Heme/Allergies: Negative.  Negative for polydipsia. Does not bruise/bleed easily.   Psychiatric/Behavioral: Negative for depression. The patient is nervous/anxious.         Physical Exam  Temp:  [36.8 °C (98.2 °F)-36.9 °C (98.5 °F)] 36.8 °C (98.2 °F)  Pulse:  [58-86] 66  Resp:  [13-25] 24  BP: (100-127)/(36-75) 127/68  SpO2:  [91 %-100 %] 100 %    Physical Exam  Vitals and nursing note reviewed.   Constitutional:       Appearance: She is well-developed. She is morbidly obese. She is not diaphoretic.      Interventions: Nasal cannula in place.   HENT:      Head: Normocephalic and atraumatic.      Nose: Nose normal.   Eyes:      Conjunctiva/sclera: Conjunctivae normal.      Pupils: Pupils are equal, round, and reactive to light.   Neck:      Thyroid: No thyromegaly.      Vascular: No JVD.   Cardiovascular:      Rate and Rhythm: Normal rate and regular rhythm.      Heart sounds: Normal heart sounds. No friction rub. No gallop.    Pulmonary:      Effort: Pulmonary effort is normal.      Breath sounds: Rhonchi present. No wheezing or rales.   Abdominal:      General: Bowel  sounds are normal. There is no distension.      Palpations: Abdomen is soft. There is no mass.      Tenderness: There is no abdominal tenderness. There is no guarding or rebound.   Musculoskeletal:         General: Swelling present. No tenderness. Normal range of motion.      Cervical back: Normal range of motion and neck supple.   Lymphadenopathy:      Cervical: No cervical adenopathy.   Skin:     General: Skin is warm and dry.      Findings: Erythema present.   Neurological:      Mental Status: She is oriented to person, place, and time. She is lethargic.      Cranial Nerves: No cranial nerve deficit.   Psychiatric:         Behavior: Behavior normal.         Fluids    Intake/Output Summary (Last 24 hours) at 4/16/2021 1123  Last data filed at 4/16/2021 1000  Gross per 24 hour   Intake 1600 ml   Output 3875 ml   Net -2275 ml       Laboratory  Recent Labs     04/14/21 2038 04/16/21  0625   WBC 10.1 10.2   RBC 4.62 4.08*   HEMOGLOBIN 12.7 11.3*   HEMATOCRIT 43.3 37.6   MCV 93.7 92.2   MCH 27.5 27.7   MCHC 29.3* 30.1*   RDW 56.3* 54.7*   PLATELETCT 237 217   MPV 11.3 11.2     Recent Labs     04/14/21 2038 04/16/21  0625   SODIUM 153* 141   POTASSIUM 4.3 4.2   CHLORIDE 110 103   CO2 29 34*   GLUCOSE 172* 142*   BUN 9 19   CREATININE 0.96 0.90   CALCIUM 9.5 9.3     Recent Labs     04/14/21  0000 04/14/21  2038 04/15/21  1508   INR 1.30* 0.93 1.24*               Imaging  DX-CHEST-PORTABLE (1 VIEW)   Final Result         1.  Hazy left lung base density suggests infiltrates   2.  Cardiomegaly           Assessment/Plan  * Acute respiratory failure (HCC)- (present on admission)  Assessment & Plan  Acute on chronic hypoxic respiratory failure  The patient transiently on BiPAP, she does have a home assistive device  Improved with slight diuresis and ongoing BiPAP/CPAP  HOB > 30  Early mobility  CXR: no discreet infiltrate and this does not appear to be infectious;      A-fib (HCC)- (present on admission)  Assessment &  Plan  Paroxysmal, monitor, continue anticoagulation  Consider beta-blockade    KATHIA and COPD overlap syndrome (HCC)- (present on admission)  Assessment & Plan  NIPPV  Bronchodilators  Continue home medications  Needs placement in long term care facility  Continue CPAP/BiPAP  Brief course of steroids    DM2 (diabetes mellitus, type 2) ([facility])- (present on admission)  Assessment & Plan  Glucose 172 on arrival  ISS  accuchecks      Pulmonary hypertension (HCC)- (present on admission)  Assessment & Plan  Not evident on current echocardiogram    Debility- (present on admission)  Assessment & Plan  SW consult  Unable to perform ADL's without assistance  Palliative care consult    Class 3 severe obesity due to excess calories with body mass index (BMI) of 50.0 to 59.9 in adult ([facility])- (present on admission)  Assessment & Plan  Contributing to respiratory failure  Consider nutrition consult    Hx of  Bipolar affective disorder, and schizophrenia- (present on admission)  Assessment & Plan  Continue prior home medication regimen     Plan  Continue with slight diuresis, changed to p.o.  Short course of steroids  Respiratory therapy and assistance, BiPAP/CPAP, pulmonary follow-up  Ongoing anticoagulation  It appears that the patient is failing at home and will need to higher level of assistance, either placement versus home health  Will discuss with   See orders  Medically complex high risk  VTE prophylaxis: Coumadin    I have performed a physical exam and reviewed and updated ROS and Plan today . In review of yesterday's note , there are no changes except as documented above.        Please note that this dictation was created using voice recognition software. I have made every reasonable attempt to correct obvious errors, but I expect that there are errors of grammar and possibly context that I did not discover before finalizing the note.

## 2021-04-16 NOTE — FACE TO FACE
Face to Face Supporting Documentation - Home Health    The encounter with this patient was in whole or in part the primary reason for home health admission.    Date of encounter:   Patient:                    MRN:                       YOB: 2023  Lorene Haro  [id number]  1975     Home health to see patient for:  Skilled Nursing care for assessment, interventions & education    Skilled need for:  Recent Deterioration of Health Status Respiratory failure, CPAP use, COPD    Skilled nursing interventions to include:  Comment: Medication, DME, Inhaler/ Cpap assistence    Homebound status evidenced by:  Needs the assistance of another person in order to leave the home. Leaving home requires a considerable and taxing effort. There is a normal inability to leave the home.    Community Physician to provide follow up care: SHAKNAR Gleason     Optional Interventions? No      I certify the face to face encounter for this home health care referral meets the CMS requirements and the encounter/clinical assessment with the patient was, in whole, or in part, for the medical condition(s) listed above, which is the primary reason for home health care. Based on my clinical findings: the service(s) are medically necessary, support the need for home health care, and the homebound criteria are met.  I certify that this patient has had a face to face encounter by myself.  Ryan Tan M.D. - NPI: [id number]

## 2021-04-16 NOTE — PROGRESS NOTES
Patient transported from [facility] to [facility]. Bedside report received from JC Naylor. Tele monitor attached to patient. Monitors verified patient is visible. Patient oriented to room and educated on call light.

## 2021-04-16 NOTE — PROGRESS NOTES
2 RN skin check complete with JC Cuellar.     Devices in place: silicone oxygen tubing.  Skin assessed under devices: complete    Confirmed pressure ulcers found on: NA    Wound consult placed : NO  The following interventions in place: pillows in use for repositioning, moisturizer     Bilateral ears pink/blanching  Sacrum is clean, dry and intact  Bilateral elbows clean, dry and intact  Scab on right heel, clean dry and intact  Left heel clean dry and intact  Bilateral lower extremities and red, swollen and brady  Pannus is red and moist

## 2021-04-16 NOTE — PROGRESS NOTES
Pt transferred to 817. Report given to emmanuel GREENE. All belongings transported with patients.

## 2021-04-16 NOTE — CARE PLAN
Problem: Communication  Goal: The ability to communicate needs accurately and effectively will improve  Outcome: PROGRESSING AS EXPECTED     Problem: Safety  Goal: Will remain free from injury  Outcome: PROGRESSING AS EXPECTED  Goal: Will remain free from falls  Outcome: PROGRESSING AS EXPECTED     Problem: Infection  Goal: Will remain free from infection  Outcome: PROGRESSING AS EXPECTED     Problem: Bowel/Gastric:  Goal: Normal bowel function is maintained or improved  Outcome: PROGRESSING AS EXPECTED  Goal: Will not experience complications related to bowel motility  Outcome: PROGRESSING AS EXPECTED     Problem: Knowledge Deficit  Goal: Knowledge of disease process/condition, treatment plan, diagnostic tests, and medications will improve  Outcome: PROGRESSING AS EXPECTED  Goal: Knowledge of the prescribed therapeutic regimen will improve  Outcome: PROGRESSING AS EXPECTED     Problem: Discharge Barriers/Planning  Goal: Patient's continuum of care needs will be met  Outcome: PROGRESSING AS EXPECTED     Problem: Respiratory:  Goal: Respiratory status will improve  Outcome: PROGRESSING AS EXPECTED     Problem: Skin Integrity  Goal: Risk for impaired skin integrity will decrease  Outcome: PROGRESSING AS EXPECTED     Problem: Pain Management  Goal: Pain level will decrease to patient's comfort goal  Outcome: PROGRESSING AS EXPECTED

## 2021-04-16 NOTE — CARE PLAN
Problem: Communication  Goal: The ability to communicate needs accurately and effectively will improve  Outcome: PROGRESSING AS EXPECTED     Problem: Safety  Goal: Will remain free from injury  Outcome: PROGRESSING AS EXPECTED  Goal: Will remain free from falls  Outcome: PROGRESSING AS EXPECTED     Problem: Infection  Goal: Will remain free from infection  Outcome: PROGRESSING AS EXPECTED     Problem: Venous Thromboembolism (VTW)/Deep Vein Thrombosis (DVT) Prevention:  Goal: Patient will participate in Venous Thrombosis (VTE)/Deep Vein Thrombosis (DVT)Prevention Measures  Outcome: PROGRESSING AS EXPECTED     Problem: Bowel/Gastric:  Goal: Normal bowel function is maintained or improved  Outcome: PROGRESSING AS EXPECTED  Goal: Will not experience complications related to bowel motility  Outcome: PROGRESSING AS EXPECTED     Problem: Knowledge Deficit  Goal: Knowledge of disease process/condition, treatment plan, diagnostic tests, and medications will improve  Outcome: PROGRESSING AS EXPECTED  Goal: Knowledge of the prescribed therapeutic regimen will improve  Outcome: PROGRESSING AS EXPECTED     Problem: Discharge Barriers/Planning  Goal: Patient's continuum of care needs will be met  Outcome: PROGRESSING AS EXPECTED     Problem: Skin Integrity  Goal: Risk for impaired skin integrity will decrease  Outcome: PROGRESSING AS EXPECTED     Problem: Respiratory:  Goal: Respiratory status will improve  Outcome: PROGRESSING SLOWER THAN EXPECTED

## 2021-04-16 NOTE — RESPIRATORY CARE
"COPD EDUCATION by COPD CLINICAL EDUCATOR  4/16/2021  at  12:40 PM by Joana Ireland, RRT     Patient interviewed by COPD education team.  Patient unable to participate in full program.  Short intervention has been conducted.  A comprehensive packet including information about COPD and home treatment with nebulizer cleaning.    Smoking Cessation Intervention and education completed, 3 minutes spent on smoking cessation education with patient.    Provided smoking cessation packet with \"Tips to Quit\" and brochure for \"Free Smoking Cessation Classes\".       COPD Screen  COPD Risk Screening  Do you have a history of COPD?: Yes    COPD Assessment  COPD Clinical Specialists ONLY  COPD Education Initiated: Yes--Short Intervention, Yes--30 Day Readmission(declined full.)  Physician Name: Sheridan WRIGHT [facility]  Pulmonary Follow Up Appointment: 05/17/21  Appt Time: 1310  Pulmonologist Name: [facility]  Referrals Initiated: Yes  Pulmonary Rehab: Yes  Smoking Cessation: Yes  $ Smoking Cessation 3-10 Minutes: Symptomatic  Smoking Pack Years: 20  Palliative Care: Yes  Home Health Care: Yes  [facility] Outreach: Yes  [facility]: N/A  [facility]: Yes  Private In-Home Care Agency: N/A  Is this a COPD exacerbation patient?: Yes  $ Demo/Eval of SVN's, MDI's and Aerosols: Yes  (OP) Pulmonary Function Testing: Yes(5/21/2020)    Meds to Beds        MY COPD ACTION PLAN     It is recommended that patients and physicians /healthcare providers complete this action plan together. This plan should be discussed at each physician visit and updated as needed.    The green, yellow and red zones show groups of symptoms of COPD. This list of symptoms is not comprehensive, and you may experience other symptoms. In the \"Actions\" column, your healthcare provider has recommended actions for you to take based on your symptoms.    Patient Name: Lorene Haro   YOB: 1975   Last Updated " "on: 4/16/2021 12:40 PM   Green Zone:  I am doing well today Actions   •  Usual activitiy and exercise level •  Take daily medications   •  Usual amounts of cough and phlegm/mucus •  Use oxygen as prescribed   •  Sleep well at night •  Continue regular exercise/diet plan   •  Appetite is good •  At all times avoid cigarette smoke, inhaled irritants     Daily Medications (these medications are taken every day):   Tiotropium Bromide Monohydrate (Spiriva) 2 Puffs Once daily        Yellow Zone:  I am having a bad day or a COPD flare Actions   •  More breathless than usual •  Continue daily medications   •  I have less energy for my daily activities •  Use quick relief inhaler as ordered   •  Increased or thicker phlegm/mucus •  Use oxygen as prescribed   •  Using quick relief inhaler/nebulizer more often •  Get plenty of rest   •  Swelling of ankles more than usual •  Use pursed lip breathing   •  More coughing than usual •  At all times avoid cigarette smoke, inhaled irritants   •  I feel like I have a \"chest cold\"   •  Poor sleep and my symptoms woke me up   •  My appetite is not good   •  My medicine is not helping    •  Call provider immediately if symptoms don’t improve     Continue daily medications, add rescue medications:   Albuterol/Ipratropium (Combivent, Duoneb)  Albuterol 3mL via nebulizer  2 Puffs Every 4 hours PRN  Every 4 hours PRN       Medications to be used during a flare up, (as Discussed with Provider):           Additional Information:  Use albuterol inhaler with spacer and rinse nebulizer per manufacturers recommendation    Red Zone:  I need urgent medical care Actions   •  Severe shortness of breath even at rest •  Call 911 or seek medical care immediately   •  Not able to do any activity because of breathing    •  Fever or shaking chills    •  Feeling confused or very drowsy     •  Chest pains    •  Coughing up blood              "

## 2021-04-16 NOTE — PROGRESS NOTES
Inpatient Anticoagulation Service Note    Date: 4/16/2021    Reason for Anticoagulation: Atrial Fibrillation   Target INR: 2.0 to 3.0  [id number] VASc Score: 2  HAS-BLED Score: 1   Hemoglobin Value: (!) 11.3  Hematocrit Value: 37.6  Lab Platelet Value: 217    INR from last 7 days     Date/Time INR Value    04/15/21 1508  (!) 1.24    04/14/21 2038  0.93        Dose from last 7 days     Date/Time Dose (mg)    04/16/21 1558  6    04/15/21 1358  9        Significant Interactions: Statin, Corticosteroids  Bridge Therapy: No  Reversal Agent Administered: Not Applicable    Comments: Home warfarin to be resumed for afib. H/H wnl with no overt bleeding noted by nursing or providers. Diet remains adequate. Patient on steroids for COPD exacerbation, may have an acute effect on INR however, INR currently sub-therapeutic but trending appropriately after initial bolus yesterday. Patient recently started anticoagulation for afib on 3/21/21. Patient achieved a therapeutic INR x 3 while on warfarin 6 mg daily. Plan to resume home dosing at 6 mg daily and follow INR closely given interaction with steroids.    Plan:  Warfarin 6 mg tonight, repeat INR in AM  Education Material Provided?: No(chronic)  Pharmacist suggested discharge dosing: warfarin 6 mg daily. Follow up INR within 48-72 hours of discharge.     Tejinder Wright, PharmD

## 2021-04-16 NOTE — PROGRESS NOTES
45 year-old female here with hypoxic respiratory failure.    Patient was previously on BiPAP, converted to oxygen mask.  - Scheduled DuoNebs   - Prednisone 40 mg x 5 days  - Restart home inhalers  -CPAP at night    Restart home antipsychotics: lithium and Abilify  -Lithium level low    Restart home warfarin for atrial fibrillation      Pola Oconnor M.D.

## 2021-04-17 LAB
ANION GAP SERPL CALC-SCNC: 5 MMOL/L (ref 7–16)
BUN SERPL-MCNC: 24 MG/DL (ref 8–22)
CALCIUM SERPL-MCNC: 9.4 MG/DL (ref 8.5–10.5)
CHLORIDE SERPL-SCNC: 99 MMOL/L (ref 96–112)
CO2 SERPL-SCNC: 34 MMOL/L (ref 20–33)
CREAT SERPL-MCNC: 1.06 MG/DL (ref 0.5–1.4)
ERYTHROCYTE [DISTWIDTH] IN BLOOD BY AUTOMATED COUNT: 53.3 FL (ref 35.9–50)
GLUCOSE SERPL-MCNC: 128 MG/DL (ref 65–99)
HCT VFR BLD AUTO: 37.8 % (ref 37–47)
HGB BLD-MCNC: 11.6 G/DL (ref 12–16)
INR PPP: 1.59 (ref 0.87–1.13)
MAGNESIUM SERPL-MCNC: 2.5 MG/DL (ref 1.5–2.5)
MCH RBC QN AUTO: 28 PG (ref 27–33)
MCHC RBC AUTO-ENTMCNC: 30.7 G/DL (ref 33.6–35)
MCV RBC AUTO: 91.1 FL (ref 81.4–97.8)
NT-PROBNP SERPL IA-MCNC: 221 PG/ML (ref 0–125)
PHOSPHATE SERPL-MCNC: 2.6 MG/DL (ref 2.5–4.5)
PLATELET # BLD AUTO: 218 K/UL (ref 164–446)
PMV BLD AUTO: 11.1 FL (ref 9–12.9)
POTASSIUM SERPL-SCNC: 3.8 MMOL/L (ref 3.6–5.5)
PROTHROMBIN TIME: 19.5 SEC (ref 12–14.6)
RBC # BLD AUTO: 4.15 M/UL (ref 4.2–5.4)
SODIUM SERPL-SCNC: 138 MMOL/L (ref 135–145)
WBC # BLD AUTO: 10.2 K/UL (ref 4.8–10.8)

## 2021-04-17 PROCEDURE — 5A09357 ASSISTANCE WITH RESPIRATORY VENTILATION, LESS THAN 24 CONSECUTIVE HOURS, CONTINUOUS POSITIVE AIRWAY PRESSURE: ICD-10-PCS | Performed by: INTERNAL MEDICINE

## 2021-04-17 PROCEDURE — 94660 CPAP INITIATION&MGMT: CPT

## 2021-04-17 PROCEDURE — 700102 HCHG RX REV CODE 250 W/ 637 OVERRIDE(OP): Performed by: HOSPITALIST

## 2021-04-17 PROCEDURE — 770020 HCHG ROOM/CARE - TELE (206)

## 2021-04-17 PROCEDURE — 94640 AIRWAY INHALATION TREATMENT: CPT

## 2021-04-17 PROCEDURE — 83880 ASSAY OF NATRIURETIC PEPTIDE: CPT

## 2021-04-17 PROCEDURE — 700102 HCHG RX REV CODE 250 W/ 637 OVERRIDE(OP): Performed by: STUDENT IN AN ORGANIZED HEALTH CARE EDUCATION/TRAINING PROGRAM

## 2021-04-17 PROCEDURE — 84100 ASSAY OF PHOSPHORUS: CPT

## 2021-04-17 PROCEDURE — A9270 NON-COVERED ITEM OR SERVICE: HCPCS | Performed by: HOSPITALIST

## 2021-04-17 PROCEDURE — 700102 HCHG RX REV CODE 250 W/ 637 OVERRIDE(OP): Performed by: INTERNAL MEDICINE

## 2021-04-17 PROCEDURE — A9270 NON-COVERED ITEM OR SERVICE: HCPCS | Performed by: STUDENT IN AN ORGANIZED HEALTH CARE EDUCATION/TRAINING PROGRAM

## 2021-04-17 PROCEDURE — 700111 HCHG RX REV CODE 636 W/ 250 OVERRIDE (IP): Performed by: INTERNAL MEDICINE

## 2021-04-17 PROCEDURE — 36415 COLL VENOUS BLD VENIPUNCTURE: CPT

## 2021-04-17 PROCEDURE — A9270 NON-COVERED ITEM OR SERVICE: HCPCS | Performed by: INTERNAL MEDICINE

## 2021-04-17 PROCEDURE — 99232 SBSQ HOSP IP/OBS MODERATE 35: CPT | Performed by: STUDENT IN AN ORGANIZED HEALTH CARE EDUCATION/TRAINING PROGRAM

## 2021-04-17 PROCEDURE — 94760 N-INVAS EAR/PLS OXIMETRY 1: CPT

## 2021-04-17 PROCEDURE — 85027 COMPLETE CBC AUTOMATED: CPT

## 2021-04-17 PROCEDURE — 83735 ASSAY OF MAGNESIUM: CPT

## 2021-04-17 PROCEDURE — 700111 HCHG RX REV CODE 636 W/ 250 OVERRIDE (IP): Performed by: EMERGENCY MEDICINE

## 2021-04-17 PROCEDURE — 85610 PROTHROMBIN TIME: CPT

## 2021-04-17 PROCEDURE — 80048 BASIC METABOLIC PNL TOTAL CA: CPT

## 2021-04-17 RX ORDER — NICOTINE 21 MG/24HR
21 PATCH, TRANSDERMAL 24 HOURS TRANSDERMAL
Status: DISCONTINUED | OUTPATIENT
Start: 2021-04-17 | End: 2021-04-20 | Stop reason: HOSPADM

## 2021-04-17 RX ADMIN — WARFARIN SODIUM 6 MG: 6 TABLET ORAL at 17:24

## 2021-04-17 RX ADMIN — LITHIUM CARBONATE 600 MG: 300 TABLET, FILM COATED, EXTENDED RELEASE ORAL at 17:24

## 2021-04-17 RX ADMIN — FUROSEMIDE 20 MG: 20 TABLET ORAL at 06:11

## 2021-04-17 RX ADMIN — FLUTICASONE PROPIONATE 50 MCG: 50 SPRAY, METERED NASAL at 17:24

## 2021-04-17 RX ADMIN — TIOTROPIUM BROMIDE INHALATION SPRAY 5 MCG: 3.12 SPRAY, METERED RESPIRATORY (INHALATION) at 08:49

## 2021-04-17 RX ADMIN — ARIPIPRAZOLE 30 MG: 15 TABLET ORAL at 06:11

## 2021-04-17 RX ADMIN — PREDNISONE 40 MG: 20 TABLET ORAL at 06:11

## 2021-04-17 RX ADMIN — BUDESONIDE 0.5 MG: 0.5 SUSPENSION RESPIRATORY (INHALATION) at 08:49

## 2021-04-17 RX ADMIN — METOPROLOL SUCCINATE 25 MG: 25 TABLET, EXTENDED RELEASE ORAL at 06:11

## 2021-04-17 RX ADMIN — ATORVASTATIN CALCIUM 20 MG: 20 TABLET, FILM COATED ORAL at 17:24

## 2021-04-17 RX ADMIN — FLUTICASONE PROPIONATE 50 MCG: 50 SPRAY, METERED NASAL at 06:11

## 2021-04-17 RX ADMIN — NICOTINE TRANSDERMAL SYSTEM 21 MG: 21 PATCH, EXTENDED RELEASE TRANSDERMAL at 17:52

## 2021-04-17 RX ADMIN — LITHIUM CARBONATE 300 MG: 300 TABLET, FILM COATED, EXTENDED RELEASE ORAL at 06:11

## 2021-04-17 RX ADMIN — MONTELUKAST 10 MG: 10 TABLET, FILM COATED ORAL at 19:15

## 2021-04-17 ASSESSMENT — ENCOUNTER SYMPTOMS
HEADACHES: 0
NAUSEA: 0
POLYDIPSIA: 0
FOCAL WEAKNESS: 0
WEAKNESS: 0
COUGH: 0
EYES NEGATIVE: 1
FEVER: 0
VOMITING: 0
DIZZINESS: 1
DEPRESSION: 0
BACK PAIN: 0
CHILLS: 0
SHORTNESS OF BREATH: 1
HEARTBURN: 0
NECK PAIN: 0
GASTROINTESTINAL NEGATIVE: 1
BRUISES/BLEEDS EASILY: 0
MUSCULOSKELETAL NEGATIVE: 1
PALPITATIONS: 0
NERVOUS/ANXIOUS: 0

## 2021-04-17 ASSESSMENT — FIBROSIS 4 INDEX: FIB4 SCORE: 0.24

## 2021-04-17 NOTE — CARE PLAN
Problem: Communication  Goal: The ability to communicate needs accurately and effectively will improve  Outcome: PROGRESSING AS EXPECTED     Problem: Safety  Goal: Will remain free from injury  Outcome: PROGRESSING AS EXPECTED  Goal: Will remain free from falls  Outcome: PROGRESSING AS EXPECTED   Fall precautions in place. Bed in lowest position. Non-skid socks in place. Personal belongings within reach. Mobility sign on door. Bed-alarm on. Call light within reach. Pt educated regarding fall prevention and verbalized understanding.     Problem: Infection  Goal: Will remain free from infection  Outcome: PROGRESSING AS EXPECTED

## 2021-04-17 NOTE — CONSULTS
"Reason for PC Consult: Advance Care Planning    Consulted by: Dr Parish     Assessment:  General: 46 yo female admitted on 4/14/2021 for Acute respiratory failure with hypoxia   PMH: Asthma, Bipolar 2 disorder, Chickenpox, COPD, HTN, Other psychological stress, Schizophrenic disorder, Yeast infection of the skin-4/1/2021.     Pt presented to the ED due to increasing SOB for approximately two days. Pt was placed on Bi-pap by [facility] and was found to be hypotensive.   Chest xray showed: \"Hazy left lung base density suggests infiltrates. Cardiomegaly.\"    Recent admit 3/3 through 3/4 ED for Cough, SOB, Fatigue, Nausea  Recent admit 3/18 through 3/23 for SOB-89% on room air, New onset A-fib  Recent admit 3/31 through 4/3 for Acute exacerbation of chronic obstructive pulmonary disease     Social: She lives on her own but has an intellectual disability and receives care giving services through [facility]-5 days a week, 8 hrs a day. She also has a rep payee. Her mother, Chucky, and her sister, Kylah, both live locally and are involved in her care.     Consults:  Critical Care  Palliative Care     Dyspnea: No- currently denies  Last BM: 04/16/21-    Pain: No- pt currently denies  Depression: Mood appropriate for situation-    Dementia: No;       Spiritual:  Is Jew or spirituality important for coping with this illness? Yes-    Has a  or spiritual provider visit been requested? No    Palliative Performance Scale: 50%    Advance Directive: None on file     DPOA: No, NOK Chucky Vance [phone], Mother  GINETTE: None on file.      Code Status: Full-      Outcome:  Met with the pt at the bedside, introduced self and the role of Palliative Care. Pts health literacy is low and we discussed her COPD and A-fib with reasoning of warfarin and increased stroke risk. Explained the progressive nature of COPD and the need for compliance with home O2, medications, and to stop smoking.   Pt expressed " "frustration with trying to \"eat better, stop drinking sodas, and stop smoking all at once\".   We discussed what she has attempted in the past.   She stated that she has used \"the gum and the patch, but it didn't work\".   Spoke about the mental aspect of nicotine addiction in addition to the physical addiction and the patch and the gum is only assisting with the physical. That it is up to her to battel the mental aspect.   She says she is actively working on that again.   We spoke about her support and caregivers who assist her with her diet and shopping. She states that one is very good and provides more nutritious foods and meals than her second caregiver, but that they are both trying.   We spoke about who provides her with cigarettes and trying to not see people who are currently smoking while she works on smoking cessation. She says that she gets her cigarettes from her sister (who smokes), but that they made a recent agreement that she will not do this any longer.   Encouraged her that smoking cessation will make a large improvement in her current level of discomfort and repeated episodes of SOB.     We spoke about her medication management. She states that her caregivers assist with arranging her weekly pill boxes and she feels that she is able to keep track of her medications this way and is being compliant with this.   She does report feeling confused with what sounds like her home oxygen humidifiers for her continuous O2 and her C-pap machine. She also expresses some confusion that she has been \"prescribed some different inhalers, they're different than what I do at home and I'm not sure what to do when I leave\".   Let Lorene know that  can assist with this education and I had seen in the chart that an order had been sent out today. She states that this would help her be better with her home O2.    Stressed again the progressive nature of COPD, that we do not have a cure, that it is a progressive process, " "and that repeated exacerbations and noncompliance with her home O2 (which may be contributing to her exacerbations) will speed the progress of the damage, which is irreversible.     I asked if she has ever discussed with her family her wishes if she was to have an event, such as a stroke, or a COPD exacerbation, that would leave her unable to communicate her wishes to her MDs.   She says that she has not put any thought into this and has never discussed this with her mother or siblings. She is unsure of her thoughts, but is open to having me call and update her mother.   She says that her mother would make decisions for her and has always helped her. She does not wish to complete an AD at this time.     She also requests that her mother come to see her if able and get her home key in order to  some cloths for her for pending DC once  has been arranged.   Let her know that I will call her mother and provide an update and that I will speak with the Unit  RN in order to have an update provided to  that she requires extra education on her home O2 humidifiers and her COPD inhalers.   Provided with Palliative Contact number for any questions.     Called and spoke with pts mother, Chucky. Introduced self and updated on conversation had with her daughter. Chucky asked \"what is wrong with her? I don't really understand\".   Andrew health literacy is also very low. Explained COPD, its progressive nature, A-fib, DM and risk of stroke.   Explained that I spoke with her daughter about what she would want if she had a catastrophic event for end of life and she felt unsure and had stated that she had never discussed this with either her or any of her siblings. Chucky also felt very unsure and stated that she would need to sit with her daughter and the \"rest of the kids\" and discuss this. She declines to come in and speak together and says that she wishes to have these discussions with her daughter at home. " Validated her thoughts and let her know that we are here to support her daughter and the rest of the family and to call with any questions. Provided with Palliative Care contact number.     Returned and updated Lorene on the conversation had with her mother. Lorene is in agreement and wishes to speak with her family once she is back at home. Let her know also that her mother will try to come in today in order to help with clean cloths.     Recommendations: I do not recommend an ethics or hospice consult at this time because pt wishes to continue with supportive treatment .    Updated: Dr Carmona, Unit  RN    Plan: DC to home once HH has been arranged. Pt has caregivers in place 5 days a week and her mother and sister live locally for added assistance.     Thank you for allowing Palliative Care to participate in this patient's care. Please feel free to call [phone] with any questions or concerns.

## 2021-04-17 NOTE — PROGRESS NOTES
Inpatient Anticoagulation Service Note    Date: 4/17/2021    Reason for Anticoagulation: Atrial Fibrillation   Target INR: 2.0 to 3.0  [id number] VASc Score: 2  HAS-BLED Score: 1   Hemoglobin Value: (!) 11.6  Hematocrit Value: 37.8  Lab Platelet Value: 218    INR from last 7 days     Date/Time INR Value    04/17/21 0212  (!) 1.59    04/15/21 1508  (!) 1.24    04/14/21 2038  0.93        Dose from last 7 days     Date/Time Dose (mg)    04/17/21 1604  6    04/16/21 1558  6    04/15/21 1358  9        Significant Interactions: Statin, Corticosteroids  Bridge Therapy: No    Reversal Agent Administered: Not Applicable  Comments: Patient on warfarin at home for afib. INR trending up appropriately. H/H stable with no s/sx bleeding per chart review. Diet adequate. Patient on steroids for COPD exacerbation which may increase INR. Continue warfarin 6 mg daily, will follow INR closely given interaction with steroids.    Plan:  Warfarin 6 mg  Education Material Provided?: No(on warfarin prior to arrival)  Pharmacist suggested discharge dosing: Warfarin 6 mg daily     Frances Patel, PharmD  PGY1 Pharmacy Practice Resident

## 2021-04-17 NOTE — DISCHARGE PLANNING
Received Choice form at [phone]  Agency/Facility Name: [facility]   Referral sent per Choice form @ [phone]

## 2021-04-17 NOTE — PROGRESS NOTES
Monitor Summary    SB-SR 54-89  (O) PACs  2 runs of PSVT up to 163 for 2.9 seconds and 1.3 seconds  .16/.08/.36

## 2021-04-17 NOTE — DISCHARGE PLANNING
Anticipated Disposition:  Home with [facility]    Action:   -> chart review completed. HH is ordered.       ~ This CM spoke with Pt at bedside, explained Home Health is needed, she agreed with the plan, and selected [facility] 2-[facility].  She also knows due to her medicaid insurance, she may unable to get a Home Health acceptance.           Barriers to Discharge:   Home Health acceptance      Plan:  Please follow up with [facility] for HH acceptance status.     Addendum      3 pm     Madhuri GREENE Palliative RN, she reported, Pt needs more educations on medications/oxygen usage/inhalers. Please notify Home Health once Pt is accepted.

## 2021-04-17 NOTE — PROGRESS NOTES
Hospital Medicine Daily Progress Note    Date of Service  4/17/2021    Chief Complaint  45 y.o. female admitted 4/14/2021 with dyspnea    Hospital Course  This is a 45-year-old female with a history of bipolar disease, schizophrenia, COPD, obstructive sleep apnea with home CPAP, home oxygen, recent development of atrial fibrillation, apparently paroxysmal, history of asthma, living alone with a caretaker apparently presenting now repetitively to the hospital with dyspnea, the patient was treated with BiPAP and brought to the hospital where she initially found to be somewhat hypertensive, the patient was admitted to ICU, she was on BiPAP transiently, and converted to oxygen mask, she received treatment for COPD and her obstructive sleep apnea, her antipsychotic medication was restarted, her warfarin was continued for atrial fibrillation although she is currently in a sinus rhythm.  The patient had a recent echocardiogram showing a normal ventricular function at 60%, there was normal diastolic function, and no evidence of pulmonary hypertension.  It is unclear if the patient can manage her a rather complicated medical treatment at home by herself.    Interval Problem Update  Patient seen and examined today. ICU Care  Care and plan discussed in IDT/Hot rounds.  Lines and assistive devices reviewed.    Patient tolerating treatment and therapies.  All Data, Medication data reviewed.  Case discussed with nursing as available.  Plan of Care reviewed with patient and notified of changes.  4/16 the patient is afebrile, her heart rate is in the 60s, she is breathing fairly unlabored and this 16 range she is on 3 L of oxygen, her blood pressure is 127/68, she feels slightly dizzy and states that she cannot remember exactly what happened yesterday.  Her laboratory data is remarkable for a slight anemia with a hemoglobin of 11.3, glucose level 142, her BMP slightly elevated at 348, chest x-ray with hazy left lung base densities  suggesting infiltrates and cardiomegaly    4/17/2021:  Evaluated patient at bedside patient has no evidence of rhonchi however does have mild evidence of wheezes bilateral posterior lung fields.  Patient presently on 1 to 2 L of supplemental oxygen apparently which she uses at home.  Patient overall improving.  Continue present medications.  Patient provided with supplemental nicotine patches today.  Patient interactive and conversant.  Pending acceptance with home health [facility].    Consultants/Specialty  Intensivist  Palliative care  Code Status  Full Code    Disposition  To be determined, the patient has had frequent readmissions    Review of Systems  Review of Systems   Constitutional: Positive for malaise/fatigue. Negative for chills and fever.   HENT: Negative.    Eyes: Negative.    Respiratory: Positive for shortness of breath. Negative for cough.    Cardiovascular: Positive for leg swelling. Negative for chest pain and palpitations.   Gastrointestinal: Negative.  Negative for heartburn, nausea and vomiting.   Genitourinary: Negative.  Negative for dysuria and frequency.   Musculoskeletal: Negative.  Negative for back pain and neck pain.   Skin: Negative.  Negative for itching and rash.   Neurological: Positive for dizziness. Negative for focal weakness, weakness and headaches.   Endo/Heme/Allergies: Negative.  Negative for polydipsia. Does not bruise/bleed easily.   Psychiatric/Behavioral: Negative for depression. The patient is not nervous/anxious.         Physical Exam  Temp:  [36.2 °C (97.2 °F)-36.9 °C (98.5 °F)] 36.3 °C (97.4 °F)  Pulse:  [60-68] 62  Resp:  [18-22] 20  BP: (102-132)/(55-65) 115/55  SpO2:  [95 %-99 %] 96 %    Physical Exam  Vitals and nursing note reviewed.   Constitutional:       Appearance: She is well-developed. She is morbidly obese. She is not diaphoretic.      Interventions: Nasal cannula in place.   HENT:      Head: Normocephalic and atraumatic.      Nose: Nose normal.   Eyes:       Conjunctiva/sclera: Conjunctivae normal.      Pupils: Pupils are equal, round, and reactive to light.   Neck:      Thyroid: No thyromegaly.      Vascular: No JVD.   Cardiovascular:      Rate and Rhythm: Normal rate and regular rhythm.      Heart sounds: Normal heart sounds. No friction rub. No gallop.    Pulmonary:      Effort: Pulmonary effort is normal.      Breath sounds: Wheezing present. No rhonchi or rales.   Abdominal:      General: Bowel sounds are normal. There is no distension.      Palpations: Abdomen is soft. There is no mass.      Tenderness: There is no abdominal tenderness. There is no guarding or rebound.   Musculoskeletal:         General: Swelling present. No tenderness. Normal range of motion.      Cervical back: Normal range of motion and neck supple.   Lymphadenopathy:      Cervical: No cervical adenopathy.   Skin:     General: Skin is warm and dry.      Findings: Erythema present.   Neurological:      Mental Status: She is oriented to person, place, and time. She is lethargic.      Cranial Nerves: No cranial nerve deficit.   Psychiatric:         Behavior: Behavior normal.         Fluids  No intake or output data in the 24 hours ending 04/17/21 1550    Laboratory  Recent Labs     04/14/21 2038 04/16/21  0625 04/17/21  0212   WBC 10.1 10.2 10.2   RBC 4.62 4.08* 4.15*   HEMOGLOBIN 12.7 11.3* 11.6*   HEMATOCRIT 43.3 37.6 37.8   MCV 93.7 92.2 91.1   MCH 27.5 27.7 28.0   MCHC 29.3* 30.1* 30.7*   RDW 56.3* 54.7* 53.3*   PLATELETCT 237 217 218   MPV 11.3 11.2 11.1     Recent Labs     04/14/21 2038 04/16/21  0625 04/17/21  0212   SODIUM 153* 141 138   POTASSIUM 4.3 4.2 3.8   CHLORIDE 110 103 99   CO2 29 34* 34*   GLUCOSE 172* 142* 128*   BUN 9 19 24*   CREATININE 0.96 0.90 1.06   CALCIUM 9.5 9.3 9.4     Recent Labs     04/14/21  2038 04/15/21  1508 04/17/21  0212   INR 0.93 1.24* 1.59*               Imaging  DX-CHEST-PORTABLE (1 VIEW)   Final Result         1.  Hazy left lung base density suggests  infiltrates   2.  Cardiomegaly           Assessment/Plan  * Acute respiratory failure (HCC)- (present on admission)  Assessment & Plan  Acute on chronic hypoxic respiratory failure  The patient transiently on BiPAP, she does have a home assistive device  Improved with slight diuresis and ongoing BiPAP/CPAP  HOB > 30  Early mobility  CXR: no discreet infiltrate and this does not appear to be infectious;  4/17/2021:  Continue with BiPAP CPAP as needed.  Patient requiring only supplemental oxygen 1 to 2 L presently which is normal baseline.  Acute exacerbation seemingly resolving.    A-fib ([facility])- (present on admission)  Assessment & Plan  Paroxysmal, monitor, continue anticoagulation  Consider beta-blockade  4/17/2021  Continue present anticoagulation.    KATHIA and COPD overlap syndrome ([facility])- (present on admission)  Assessment & Plan  NIPPV  Bronchodilators  Continue home medications  Needs placement in long term care facility  Continue CPAP/BiPAP  Brief course of steroids  4/17/2021:  Patient requiring only supplemental oxygen 1 to 2 L presently.  Apparently 1 to 2 L is her normal baseline.  Continue present management.  Patient not requiring BiPAP at present.    DM2 (diabetes mellitus, type 2) ([facility])- (present on admission)  Assessment & Plan  Glucose 172 on arrival  ISS  accuchecks  4/17/2025 1:  Continue present management.      Pulmonary hypertension ([facility])- (present on admission)  Assessment & Plan  Not evident on current echocardiogram    Debility- (present on admission)  Assessment & Plan  SW consult  Unable to perform ADL's without assistance  Palliative care consult  4/17/2021:  Palliative recommendations none noted appreciated.  Pending acceptance of home health.  Patient will require ongoing care.    Class 3 severe obesity due to excess calories with body mass index (BMI) of 50.0 to 59.9 in adult ([facility])- (present on admission)  Assessment & Plan  Contributing to respiratory failure  Consider nutrition  consult    Hx of  Bipolar affective disorder, and schizophrenia- (present on admission)  Assessment & Plan  Continue prior home medication regimen     VTE prophylaxis: Coumadin

## 2021-04-18 LAB
ANION GAP SERPL CALC-SCNC: 4 MMOL/L (ref 7–16)
BUN SERPL-MCNC: 24 MG/DL (ref 8–22)
CALCIUM SERPL-MCNC: 9.4 MG/DL (ref 8.5–10.5)
CHLORIDE SERPL-SCNC: 101 MMOL/L (ref 96–112)
CO2 SERPL-SCNC: 32 MMOL/L (ref 20–33)
CREAT SERPL-MCNC: 0.88 MG/DL (ref 0.5–1.4)
GLUCOSE SERPL-MCNC: 109 MG/DL (ref 65–99)
INR PPP: 1.61 (ref 0.87–1.13)
POTASSIUM SERPL-SCNC: 3.8 MMOL/L (ref 3.6–5.5)
PROTHROMBIN TIME: 19.7 SEC (ref 12–14.6)
SODIUM SERPL-SCNC: 137 MMOL/L (ref 135–145)

## 2021-04-18 PROCEDURE — 94640 AIRWAY INHALATION TREATMENT: CPT

## 2021-04-18 PROCEDURE — 99232 SBSQ HOSP IP/OBS MODERATE 35: CPT | Performed by: STUDENT IN AN ORGANIZED HEALTH CARE EDUCATION/TRAINING PROGRAM

## 2021-04-18 PROCEDURE — A9270 NON-COVERED ITEM OR SERVICE: HCPCS | Performed by: STUDENT IN AN ORGANIZED HEALTH CARE EDUCATION/TRAINING PROGRAM

## 2021-04-18 PROCEDURE — 700102 HCHG RX REV CODE 250 W/ 637 OVERRIDE(OP): Performed by: STUDENT IN AN ORGANIZED HEALTH CARE EDUCATION/TRAINING PROGRAM

## 2021-04-18 PROCEDURE — 700111 HCHG RX REV CODE 636 W/ 250 OVERRIDE (IP): Performed by: EMERGENCY MEDICINE

## 2021-04-18 PROCEDURE — 770006 HCHG ROOM/CARE - MED/SURG/GYN SEMI*

## 2021-04-18 PROCEDURE — 700111 HCHG RX REV CODE 636 W/ 250 OVERRIDE (IP): Performed by: INTERNAL MEDICINE

## 2021-04-18 PROCEDURE — 85610 PROTHROMBIN TIME: CPT

## 2021-04-18 PROCEDURE — 770001 HCHG ROOM/CARE - MED/SURG/GYN PRIV*

## 2021-04-18 PROCEDURE — 36415 COLL VENOUS BLD VENIPUNCTURE: CPT

## 2021-04-18 PROCEDURE — 700102 HCHG RX REV CODE 250 W/ 637 OVERRIDE(OP): Performed by: HOSPITALIST

## 2021-04-18 PROCEDURE — 80048 BASIC METABOLIC PNL TOTAL CA: CPT

## 2021-04-18 PROCEDURE — 94660 CPAP INITIATION&MGMT: CPT

## 2021-04-18 PROCEDURE — 700102 HCHG RX REV CODE 250 W/ 637 OVERRIDE(OP): Performed by: INTERNAL MEDICINE

## 2021-04-18 PROCEDURE — A9270 NON-COVERED ITEM OR SERVICE: HCPCS | Performed by: INTERNAL MEDICINE

## 2021-04-18 PROCEDURE — 5A09357 ASSISTANCE WITH RESPIRATORY VENTILATION, LESS THAN 24 CONSECUTIVE HOURS, CONTINUOUS POSITIVE AIRWAY PRESSURE: ICD-10-PCS | Performed by: INTERNAL MEDICINE

## 2021-04-18 PROCEDURE — A9270 NON-COVERED ITEM OR SERVICE: HCPCS | Performed by: HOSPITALIST

## 2021-04-18 RX ORDER — WARFARIN SODIUM 6 MG/1
6 TABLET ORAL DAILY
Status: DISCONTINUED | OUTPATIENT
Start: 2021-04-19 | End: 2021-04-20 | Stop reason: HOSPADM

## 2021-04-18 RX ADMIN — TIOTROPIUM BROMIDE INHALATION SPRAY 5 MCG: 3.12 SPRAY, METERED RESPIRATORY (INHALATION) at 07:11

## 2021-04-18 RX ADMIN — LITHIUM CARBONATE 600 MG: 300 TABLET, FILM COATED, EXTENDED RELEASE ORAL at 16:18

## 2021-04-18 RX ADMIN — FUROSEMIDE 20 MG: 20 TABLET ORAL at 04:39

## 2021-04-18 RX ADMIN — BUDESONIDE 0.5 MG: 0.5 SUSPENSION RESPIRATORY (INHALATION) at 07:11

## 2021-04-18 RX ADMIN — LITHIUM CARBONATE 300 MG: 300 TABLET, FILM COATED, EXTENDED RELEASE ORAL at 04:39

## 2021-04-18 RX ADMIN — PREDNISONE 40 MG: 20 TABLET ORAL at 04:38

## 2021-04-18 RX ADMIN — WARFARIN SODIUM 9 MG: 6 TABLET ORAL at 16:19

## 2021-04-18 RX ADMIN — ATORVASTATIN CALCIUM 20 MG: 20 TABLET, FILM COATED ORAL at 16:18

## 2021-04-18 RX ADMIN — MONTELUKAST 10 MG: 10 TABLET, FILM COATED ORAL at 21:26

## 2021-04-18 RX ADMIN — NICOTINE TRANSDERMAL SYSTEM 21 MG: 21 PATCH, EXTENDED RELEASE TRANSDERMAL at 04:41

## 2021-04-18 RX ADMIN — FLUTICASONE PROPIONATE 50 MCG: 50 SPRAY, METERED NASAL at 16:18

## 2021-04-18 RX ADMIN — FLUTICASONE PROPIONATE 50 MCG: 50 SPRAY, METERED NASAL at 04:38

## 2021-04-18 RX ADMIN — METOPROLOL SUCCINATE 25 MG: 25 TABLET, EXTENDED RELEASE ORAL at 04:39

## 2021-04-18 RX ADMIN — ARIPIPRAZOLE 30 MG: 15 TABLET ORAL at 04:38

## 2021-04-18 RX ADMIN — BUDESONIDE 0.5 MG: 0.5 SUSPENSION RESPIRATORY (INHALATION) at 19:48

## 2021-04-18 ASSESSMENT — PAIN DESCRIPTION - PAIN TYPE
TYPE: ACUTE PAIN

## 2021-04-18 ASSESSMENT — ENCOUNTER SYMPTOMS
CHILLS: 0
DIZZINESS: 0
NECK PAIN: 0
EYES NEGATIVE: 1
SHORTNESS OF BREATH: 1
VOMITING: 0
MUSCULOSKELETAL NEGATIVE: 1
NAUSEA: 0
FEVER: 0
DEPRESSION: 0
BRUISES/BLEEDS EASILY: 0
HEARTBURN: 0
HEADACHES: 0
PALPITATIONS: 0
COUGH: 0
POLYDIPSIA: 0
WEAKNESS: 0
NERVOUS/ANXIOUS: 0
BACK PAIN: 0
FOCAL WEAKNESS: 0
GASTROINTESTINAL NEGATIVE: 1

## 2021-04-18 ASSESSMENT — FIBROSIS 4 INDEX: FIB4 SCORE: 0.24

## 2021-04-18 NOTE — PROGRESS NOTES
Inpatient Anticoagulation Service Note    Date: 4/18/2021    Reason for Anticoagulation: Atrial Fibrillation   Target INR: 2.0 to 3.0  [id number] VASc Score: 2  HAS-BLED Score: 1   Hemoglobin Value: (!) 11.6  Hematocrit Value: 37.8  Lab Platelet Value: 218    INR from last 7 days     Date/Time INR Value    04/18/21 0214  (!) 1.61    04/17/21 0212  (!) 1.59    04/15/21 1508  (!) 1.24    04/14/21 2038  0.93        Dose from last 7 days     Date/Time Dose (mg)    04/18/21 0841  9    04/17/21 1604  6    04/16/21 1558  6    04/15/21 1358  9        Significant Interactions: Statin, Corticosteroids  Bridge Therapy: No    Reversal Agent Administered: Not Applicable  Comments: INR trending up but more slowly. H/H stable with no s/sx bleeding per chart review. Diet adequate. Patient continues prednisone for COPD exacerbation which may increase INR. Given slow trend up in INR will bolus 9 mg today then resume 6 mg daily dosing. Continue to trend INR closely given interaction with steroids.    Plan:  Warfarin 9 mg  Education Material Provided?: No  Pharmacist suggested discharge dosing: Warfarin 6 mg daily     Frances Patel, PharmD  PGY1 Pharmacy Practice Resident

## 2021-04-18 NOTE — CARE PLAN
Problem: Communication  Goal: The ability to communicate needs accurately and effectively will improve  Outcome: PROGRESSING AS EXPECTED     Problem: Safety  Goal: Will remain free from falls  Outcome: PROGRESSING AS EXPECTED  Note: Fall precautions in place. Bed in lowest position. Non-skid socks in place. Personal possessions within reach. Mobility sign on door. Bed-alarm on. Call light within reach. Pt educated regarding fall prevention and states understanding.        Problem: Knowledge Deficit  Goal: Knowledge of the prescribed therapeutic regimen will improve  Outcome: PROGRESSING AS EXPECTED

## 2021-04-18 NOTE — PROGRESS NOTES
Assumed care of patient. Bedside report, received from Bernice GREENE. Updated POC, call light within reach, and fall precautions in place. Bed locked and and in lowest position. Patient instructed to call for assistance before getting out of bed. All questions answered, no further needs at this time..

## 2021-04-18 NOTE — DISCHARGE PLANNING
Anticipated Discharge Disposition: home vs home with home health    Action: RN NEREIDA called and left voicemail with [facility] regarding referral.    Barriers to Discharge: medical clearance, home health, patient has Medicaid FFS    Plan: Case coordination to f/u with home health referral

## 2021-04-19 ENCOUNTER — TELEPHONE (OUTPATIENT)
Dept: CARDIOLOGY | Facility: MEDICAL CENTER | Age: 46
End: 2021-04-19

## 2021-04-19 ENCOUNTER — NON-PROVIDER VISIT (OUTPATIENT)
Dept: CARDIOLOGY | Facility: MEDICAL CENTER | Age: 46
End: 2021-04-19
Payer: MEDICAID

## 2021-04-19 DIAGNOSIS — I48.0 PAF (PAROXYSMAL ATRIAL FIBRILLATION) (HCC): ICD-10-CM

## 2021-04-19 PROBLEM — R73.9 HYPERGLYCEMIA: Status: ACTIVE | Noted: 2021-04-15

## 2021-04-19 LAB
ANION GAP SERPL CALC-SCNC: 13 MMOL/L (ref 7–16)
BACTERIA BLD CULT: NORMAL
BACTERIA BLD CULT: NORMAL
BASOPHILS # BLD AUTO: 0.1 % (ref 0–1.8)
BASOPHILS # BLD: 0.01 K/UL (ref 0–0.12)
BUN SERPL-MCNC: 22 MG/DL (ref 8–22)
CALCIUM SERPL-MCNC: 9.3 MG/DL (ref 8.5–10.5)
CHLORIDE SERPL-SCNC: 101 MMOL/L (ref 96–112)
CO2 SERPL-SCNC: 27 MMOL/L (ref 20–33)
CREAT SERPL-MCNC: 0.9 MG/DL (ref 0.5–1.4)
EOSINOPHIL # BLD AUTO: 0.02 K/UL (ref 0–0.51)
EOSINOPHIL NFR BLD: 0.2 % (ref 0–6.9)
ERYTHROCYTE [DISTWIDTH] IN BLOOD BY AUTOMATED COUNT: 53.6 FL (ref 35.9–50)
EST. AVERAGE GLUCOSE BLD GHB EST-MCNC: 128 MG/DL
GLUCOSE SERPL-MCNC: 222 MG/DL (ref 65–99)
HBA1C MFR BLD: 6.1 % (ref 4–5.6)
HCT VFR BLD AUTO: 43.3 % (ref 37–47)
HGB BLD-MCNC: 12.9 G/DL (ref 12–16)
IMM GRANULOCYTES # BLD AUTO: 0.06 K/UL (ref 0–0.11)
IMM GRANULOCYTES NFR BLD AUTO: 0.6 % (ref 0–0.9)
INR PPP: 1.7 (ref 0.87–1.13)
LITHIUM SERPL-MCNC: 0.7 MMOL/L (ref 0.6–1.2)
LYMPHOCYTES # BLD AUTO: 0.7 K/UL (ref 1–4.8)
LYMPHOCYTES NFR BLD: 6.5 % (ref 22–41)
MCH RBC QN AUTO: 27.5 PG (ref 27–33)
MCHC RBC AUTO-ENTMCNC: 29.8 G/DL (ref 33.6–35)
MCV RBC AUTO: 92.3 FL (ref 81.4–97.8)
MONOCYTES # BLD AUTO: 0.12 K/UL (ref 0–0.85)
MONOCYTES NFR BLD AUTO: 1.1 % (ref 0–13.4)
NEUTROPHILS # BLD AUTO: 9.82 K/UL (ref 2–7.15)
NEUTROPHILS NFR BLD: 91.5 % (ref 44–72)
NRBC # BLD AUTO: 0 K/UL
NRBC BLD-RTO: 0 /100 WBC
PLATELET # BLD AUTO: 177 K/UL (ref 164–446)
PMV BLD AUTO: 12 FL (ref 9–12.9)
POTASSIUM SERPL-SCNC: 4.9 MMOL/L (ref 3.6–5.5)
PROCALCITONIN SERPL-MCNC: <0.05 NG/ML
PROTHROMBIN TIME: 20.5 SEC (ref 12–14.6)
RBC # BLD AUTO: 4.69 M/UL (ref 4.2–5.4)
SIGNIFICANT IND 70042: NORMAL
SIGNIFICANT IND 70042: NORMAL
SITE SITE: NORMAL
SITE SITE: NORMAL
SODIUM SERPL-SCNC: 141 MMOL/L (ref 135–145)
SOURCE SOURCE: NORMAL
SOURCE SOURCE: NORMAL
WBC # BLD AUTO: 10.7 K/UL (ref 4.8–10.8)

## 2021-04-19 PROCEDURE — 80048 BASIC METABOLIC PNL TOTAL CA: CPT

## 2021-04-19 PROCEDURE — 94660 CPAP INITIATION&MGMT: CPT

## 2021-04-19 PROCEDURE — 700111 HCHG RX REV CODE 636 W/ 250 OVERRIDE (IP): Performed by: EMERGENCY MEDICINE

## 2021-04-19 PROCEDURE — 700102 HCHG RX REV CODE 250 W/ 637 OVERRIDE(OP): Performed by: HOSPITALIST

## 2021-04-19 PROCEDURE — 80178 ASSAY OF LITHIUM: CPT

## 2021-04-19 PROCEDURE — 700111 HCHG RX REV CODE 636 W/ 250 OVERRIDE (IP): Performed by: INTERNAL MEDICINE

## 2021-04-19 PROCEDURE — A9270 NON-COVERED ITEM OR SERVICE: HCPCS | Performed by: HOSPITALIST

## 2021-04-19 PROCEDURE — 99232 SBSQ HOSP IP/OBS MODERATE 35: CPT | Performed by: FAMILY MEDICINE

## 2021-04-19 PROCEDURE — 700102 HCHG RX REV CODE 250 W/ 637 OVERRIDE(OP): Performed by: STUDENT IN AN ORGANIZED HEALTH CARE EDUCATION/TRAINING PROGRAM

## 2021-04-19 PROCEDURE — 700102 HCHG RX REV CODE 250 W/ 637 OVERRIDE(OP): Performed by: INTERNAL MEDICINE

## 2021-04-19 PROCEDURE — 83036 HEMOGLOBIN GLYCOSYLATED A1C: CPT

## 2021-04-19 PROCEDURE — 94640 AIRWAY INHALATION TREATMENT: CPT

## 2021-04-19 PROCEDURE — 770006 HCHG ROOM/CARE - MED/SURG/GYN SEMI*

## 2021-04-19 PROCEDURE — 85025 COMPLETE CBC W/AUTO DIFF WBC: CPT

## 2021-04-19 PROCEDURE — A9270 NON-COVERED ITEM OR SERVICE: HCPCS | Performed by: INTERNAL MEDICINE

## 2021-04-19 PROCEDURE — A9270 NON-COVERED ITEM OR SERVICE: HCPCS | Performed by: STUDENT IN AN ORGANIZED HEALTH CARE EDUCATION/TRAINING PROGRAM

## 2021-04-19 PROCEDURE — 5A09357 ASSISTANCE WITH RESPIRATORY VENTILATION, LESS THAN 24 CONSECUTIVE HOURS, CONTINUOUS POSITIVE AIRWAY PRESSURE: ICD-10-PCS | Performed by: INTERNAL MEDICINE

## 2021-04-19 PROCEDURE — 94760 N-INVAS EAR/PLS OXIMETRY 1: CPT

## 2021-04-19 PROCEDURE — RXMED WILLOW AMBULATORY MEDICATION CHARGE: Performed by: NURSE PRACTITIONER

## 2021-04-19 PROCEDURE — 85610 PROTHROMBIN TIME: CPT

## 2021-04-19 PROCEDURE — 93268 ECG RECORD/REVIEW: CPT | Performed by: INTERNAL MEDICINE

## 2021-04-19 PROCEDURE — 84145 PROCALCITONIN (PCT): CPT

## 2021-04-19 RX ADMIN — BUDESONIDE 0.5 MG: 0.5 SUSPENSION RESPIRATORY (INHALATION) at 08:00

## 2021-04-19 RX ADMIN — TIOTROPIUM BROMIDE INHALATION SPRAY 5 MCG: 3.12 SPRAY, METERED RESPIRATORY (INHALATION) at 08:00

## 2021-04-19 RX ADMIN — ARIPIPRAZOLE 30 MG: 15 TABLET ORAL at 05:32

## 2021-04-19 RX ADMIN — ATORVASTATIN CALCIUM 20 MG: 20 TABLET, FILM COATED ORAL at 17:06

## 2021-04-19 RX ADMIN — DOCUSATE SODIUM 50 MG AND SENNOSIDES 8.6 MG 2 TABLET: 8.6; 5 TABLET, FILM COATED ORAL at 17:06

## 2021-04-19 RX ADMIN — FLUTICASONE PROPIONATE 50 MCG: 50 SPRAY, METERED NASAL at 17:06

## 2021-04-19 RX ADMIN — LITHIUM CARBONATE 600 MG: 300 TABLET, FILM COATED, EXTENDED RELEASE ORAL at 17:06

## 2021-04-19 RX ADMIN — FLUTICASONE PROPIONATE 50 MCG: 50 SPRAY, METERED NASAL at 05:32

## 2021-04-19 RX ADMIN — DOCUSATE SODIUM 50 MG AND SENNOSIDES 8.6 MG 2 TABLET: 8.6; 5 TABLET, FILM COATED ORAL at 05:32

## 2021-04-19 RX ADMIN — BUDESONIDE 0.5 MG: 0.5 SUSPENSION RESPIRATORY (INHALATION) at 21:34

## 2021-04-19 RX ADMIN — FUROSEMIDE 20 MG: 20 TABLET ORAL at 05:32

## 2021-04-19 RX ADMIN — PREDNISONE 40 MG: 20 TABLET ORAL at 05:32

## 2021-04-19 RX ADMIN — MONTELUKAST 10 MG: 10 TABLET, FILM COATED ORAL at 20:36

## 2021-04-19 RX ADMIN — NICOTINE TRANSDERMAL SYSTEM 21 MG: 21 PATCH, EXTENDED RELEASE TRANSDERMAL at 05:32

## 2021-04-19 RX ADMIN — METOPROLOL SUCCINATE 25 MG: 25 TABLET, EXTENDED RELEASE ORAL at 05:32

## 2021-04-19 RX ADMIN — LITHIUM CARBONATE 300 MG: 300 TABLET, FILM COATED, EXTENDED RELEASE ORAL at 05:32

## 2021-04-19 RX ADMIN — WARFARIN SODIUM 6 MG: 6 TABLET ORAL at 17:06

## 2021-04-19 ASSESSMENT — ENCOUNTER SYMPTOMS
DIAPHORESIS: 0
SORE THROAT: 0
NERVOUS/ANXIOUS: 0
SPEECH CHANGE: 0
HEARTBURN: 0
WEAKNESS: 1
NAUSEA: 0
CHILLS: 0
PALPITATIONS: 0
WHEEZING: 0
VOMITING: 0
NECK PAIN: 0
FLANK PAIN: 0
FOCAL WEAKNESS: 0
ABDOMINAL PAIN: 0
MYALGIAS: 0
FEVER: 0
SENSORY CHANGE: 0
DIARRHEA: 0
HEADACHES: 0
COUGH: 0
BLURRED VISION: 0
DIZZINESS: 1
BACK PAIN: 0
SHORTNESS OF BREATH: 0

## 2021-04-19 ASSESSMENT — PAIN DESCRIPTION - PAIN TYPE
TYPE: ACUTE PAIN

## 2021-04-19 NOTE — PROGRESS NOTES
Pt to transfer to [facility].  Provided telephone report to receiving RNLee Ann.  Pt resting.  Awaiting transport.

## 2021-04-19 NOTE — PROGRESS NOTES
2 RN SKIN CHECK     Heels are calloused/cracking, toenails are thickened/yellow    No areas of breakdown or concern   BLE are red/swollen, 2+ pitting edema, shiny   Purewick placed for incontinence   Moisture wicking pads placed under pt, new system in place, no areas of breakdown/concern  Pannus and underneath bilateral breasts are red/moist   Interdry placed, no areas of breakdown   Facial integrity is red/dry   Flaking around hairline and cheeks, intact   Sacrum is red/intact and blanching   Pillows in use for support/positioning, pt turning independently, appropriate wound/incontinence associated breakdown preventions in place

## 2021-04-19 NOTE — CARE PLAN
Problem: Safety  Goal: Will remain free from falls  Outcome: PROGRESSING AS EXPECTED  Bed alarm on, pt oriented to new room and safety/fall precautions   Verbalized understanding      Problem: Knowledge Deficit  Goal: Knowledge of disease process/condition, treatment plan, diagnostic tests, and medications will improve  Outcome: PROGRESSING AS EXPECTED  Pt updated on POC and what to expect this shift   All questions answered      Problem: Pain Management  Goal: Pain level will decrease to patient's comfort goal  Outcome: PROGRESSING AS EXPECTED  Pt denies and pain at this time   Resting comfortably at a 0/10

## 2021-04-19 NOTE — TELEPHONE ENCOUNTER
Home enrollment completed for the 30 day [employer] [facility] monitoring program per Angelita Posada PA-C.(Enrolled under patient's cardiologist, Oz Guardado MD).  Monitor to be shipped to patient by [employer].  >Pending Baseline.  >Pending EOS.

## 2021-04-19 NOTE — PROGRESS NOTES
[facility] Medicine Daily Progress Note    Date of Service  4/19/2021    Chief Complaint  45 y.o. female admitted 4/14/2021 with respiratory failure.    Hospital Course  Admitted with acute on chronic respiratory failure, known history of COPD and obstructive sleep apnea.  She initially required admission to the ICU as she required BiPAP for short course.  She has had recurrent admissions.  She has known history of bipolar disorder and schizophrenia, and requires caregiver assistance.    Interval Problem Update  Resp failure - O2 2 lpm NC  Afib - currently sinus  Debility -states that she lives by herself, has a caregiver who comes in several hours per day, her mother and sister might help at times.    Consultants/Specialty  Critical care    Code Status  Full Code    Disposition  Home health  Anticoagulation clinic    Review of Systems  Review of Systems   Constitutional: Negative for chills, diaphoresis, fever and malaise/fatigue.   HENT: Negative for congestion, hearing loss and sore throat.    Eyes: Negative for blurred vision.   Respiratory: Negative for cough, shortness of breath and wheezing.    Cardiovascular: Positive for leg swelling. Negative for chest pain and palpitations.   Gastrointestinal: Negative for abdominal pain, diarrhea, heartburn, nausea and vomiting.   Genitourinary: Negative for dysuria, flank pain and hematuria.   Musculoskeletal: Negative for back pain, joint pain, myalgias and neck pain.   Skin: Negative for rash.   Neurological: Positive for dizziness and weakness. Negative for sensory change, speech change, focal weakness and headaches.   Psychiatric/Behavioral: The patient is not nervous/anxious.         Physical Exam  Temp:  [36 °C (96.8 °F)-36.4 °C (97.6 °F)] 36.1 °C (96.9 °F)  Pulse:  [50-68] 55  Resp:  [16-20] 17  BP: (105-142)/(61-86) 126/86  SpO2:  [95 %-99 %] 95 %    Physical Exam  Vitals and nursing note reviewed.   Constitutional:       Appearance: She is obese.   HENT:       Head: Normocephalic and atraumatic.      Nose: No congestion.      Mouth/Throat:      Mouth: Mucous membranes are moist.   Eyes:      Extraocular Movements: Extraocular movements intact.      Conjunctiva/sclera: Conjunctivae normal.      Pupils: Pupils are equal, round, and reactive to light.   Cardiovascular:      Rate and Rhythm: Normal rate and regular rhythm.   Pulmonary:      Effort: Pulmonary effort is normal.      Breath sounds: Normal breath sounds.   Abdominal:      General: Bowel sounds are normal. There is no distension.      Palpations: Abdomen is soft.      Tenderness: There is no abdominal tenderness. There is no guarding.   Musculoskeletal:      Cervical back: No tenderness.      Right lower leg: Edema present.      Left lower leg: Edema present.   Skin:     General: Skin is warm and dry.   Neurological:      General: No focal deficit present.      Mental Status: She is alert and oriented to person, place, and time.      Cranial Nerves: No cranial nerve deficit.         Fluids    Intake/Output Summary (Last 24 hours) at 4/19/2021 1119  Last data filed at 4/19/2021 0925  Gross per 24 hour   Intake 270 ml   Output 0 ml   Net 270 ml       Laboratory  Recent Labs     04/17/21 0212   WBC 10.2   RBC 4.15*   HEMOGLOBIN 11.6*   HEMATOCRIT 37.8   MCV 91.1   MCH 28.0   MCHC 30.7*   RDW 53.3*   PLATELETCT 218   MPV 11.1     Recent Labs     04/17/21 0212 04/18/21 0214   SODIUM 138 137   POTASSIUM 3.8 3.8   CHLORIDE 99 101   CO2 34* 32   GLUCOSE 128* 109*   BUN 24* 24*   CREATININE 1.06 0.88   CALCIUM 9.4 9.4     Recent Labs     04/17/21  0212 04/18/21  0214 04/19/21  0223   INR 1.59* 1.61* 1.70*               Imaging  DX-CHEST-PORTABLE (1 VIEW)   Final Result         1.  Hazy left lung base density suggests infiltrates   2.  Cardiomegaly           Assessment/Plan  * Acute on chronic respiratory failure with hypoxia (HCC)- (present on admission)  Assessment & Plan  Bipap short course  RT protocol    Debility-  (present on admission)  Assessment & Plan  Caregiver assistance    KATHIA and COPD overlap syndrome (HCC)- (present on admission)  Assessment & Plan  CPAP  RT protocol    COPD (chronic obstructive pulmonary disease) (HCC)- (present on admission)  Assessment & Plan  Spiriva, Singulair, Pulmicort, RT protocol    Hyperglycemia- (present on admission)  Assessment & Plan  Check [id number]      Bilateral edema of lower extremity- (present on admission)  Assessment & Plan  Lasix    Paroxysmal atrial fibrillation ([facility])- (present on admission)  Assessment & Plan  Toprol, Coumadin  Follow up with Cardiology for Zio patch    Schizophrenia ([facility])- (present on admission)  Assessment & Plan  Abilify    Morbid obesity ([facility])- (present on admission)  Assessment & Plan  Body mass index is 48.7 kg/m².    Bipolar Disorder- (present on admission)  Assessment & Plan  Lithium  check level       VTE prophylaxis: Coumadin

## 2021-04-19 NOTE — PROGRESS NOTES
Hospital Medicine Daily Progress Note    Date of Service  4/18/2021    Chief Complaint  45 y.o. female admitted 4/14/2021 with dyspnea    Hospital Course  This is a 45-year-old female with a history of bipolar disease, schizophrenia, COPD, obstructive sleep apnea with home CPAP, home oxygen, recent development of atrial fibrillation, apparently paroxysmal, history of asthma, living alone with a caretaker apparently presenting now repetitively to the hospital with dyspnea, the patient was treated with BiPAP and brought to the hospital where she initially found to be somewhat hypertensive, the patient was admitted to ICU, she was on BiPAP transiently, and converted to oxygen mask, she received treatment for COPD and her obstructive sleep apnea, her antipsychotic medication was restarted, her warfarin was continued for atrial fibrillation although she is currently in a sinus rhythm.  The patient had a recent echocardiogram showing a normal ventricular function at 60%, there was normal diastolic function, and no evidence of pulmonary hypertension.  It is unclear if the patient can manage her a rather complicated medical treatment at home by herself.    Interval Problem Update  Patient seen and examined today. ICU Care  Care and plan discussed in IDT/Hot rounds.  Lines and assistive devices reviewed.    Patient tolerating treatment and therapies.  All Data, Medication data reviewed.  Case discussed with nursing as available.  Plan of Care reviewed with patient and notified of changes.  4/16 the patient is afebrile, her heart rate is in the 60s, she is breathing fairly unlabored and this 16 range she is on 3 L of oxygen, her blood pressure is 127/68, she feels slightly dizzy and states that she cannot remember exactly what happened yesterday.  Her laboratory data is remarkable for a slight anemia with a hemoglobin of 11.3, glucose level 142, her BMP slightly elevated at 348, chest x-ray with hazy left lung base densities  suggesting infiltrates and cardiomegaly    4/17/2021:  Evaluated patient at bedside patient has no evidence of rhonchi however does have mild evidence of wheezes bilateral posterior lung fields.  Patient presently on 1 to 2 L of supplemental oxygen apparently which she uses at home.  Patient overall improving.  Continue present medications.  Patient provided with supplemental nicotine patches today.  Patient interactive and conversant.  Pending acceptance with home health [facility].    4/18/2021:  Patient continues to have improvement.  Pending home health recommendations from case management.  This will be addressed on 4/19/2021.  Patient may go to medical floor. Transfer orders have been placed.    Consultants/Specialty  Intensivist  Palliative care  Code Status  Full Code    Disposition  To be determined, the patient has had frequent readmissions    Review of Systems  Review of Systems   Constitutional: Negative for chills, fever and malaise/fatigue.   HENT: Negative.    Eyes: Negative.    Respiratory: Positive for shortness of breath. Negative for cough.    Cardiovascular: Positive for leg swelling. Negative for chest pain and palpitations.   Gastrointestinal: Negative.  Negative for heartburn, nausea and vomiting.   Genitourinary: Negative.  Negative for dysuria and frequency.   Musculoskeletal: Negative.  Negative for back pain and neck pain.   Skin: Negative.  Negative for itching and rash.   Neurological: Negative for dizziness, focal weakness, weakness and headaches.   Endo/Heme/Allergies: Negative.  Negative for polydipsia. Does not bruise/bleed easily.   Psychiatric/Behavioral: Negative for depression. The patient is not nervous/anxious.         Physical Exam  Temp:  [36 °C (96.8 °F)-36.4 °C (97.5 °F)] 36 °C (96.8 °F)  Pulse:  [52-71] 52  Resp:  [18-20] 20  BP: (107-130)/(52-71) 119/61  SpO2:  [95 %-99 %] 97 %    Physical Exam  Vitals and nursing note reviewed.   Constitutional:       Appearance: She is  well-developed. She is morbidly obese. She is not diaphoretic.      Interventions: Nasal cannula in place.   HENT:      Head: Normocephalic and atraumatic.      Nose: Nose normal.   Eyes:      Conjunctiva/sclera: Conjunctivae normal.      Pupils: Pupils are equal, round, and reactive to light.   Neck:      Thyroid: No thyromegaly.      Vascular: No JVD.   Cardiovascular:      Rate and Rhythm: Normal rate and regular rhythm.      Heart sounds: Normal heart sounds. No friction rub. No gallop.    Pulmonary:      Effort: Pulmonary effort is normal.      Breath sounds: Wheezing present. No rhonchi or rales.   Abdominal:      General: Bowel sounds are normal. There is no distension.      Palpations: Abdomen is soft. There is no mass.      Tenderness: There is no abdominal tenderness. There is no guarding or rebound.   Musculoskeletal:         General: Swelling present. No tenderness. Normal range of motion.      Cervical back: Normal range of motion and neck supple.   Lymphadenopathy:      Cervical: No cervical adenopathy.   Skin:     General: Skin is warm and dry.      Findings: Erythema present.   Neurological:      Mental Status: She is oriented to person, place, and time. She is lethargic.      Cranial Nerves: No cranial nerve deficit.   Psychiatric:         Behavior: Behavior normal.         Fluids    Intake/Output Summary (Last 24 hours) at 4/18/2021 1717  Last data filed at 4/18/2021 0900  Gross per 24 hour   Intake 240 ml   Output --   Net 240 ml       Laboratory  Recent Labs     04/16/21 0625 04/17/21 0212   WBC 10.2 10.2   RBC 4.08* 4.15*   HEMOGLOBIN 11.3* 11.6*   HEMATOCRIT 37.6 37.8   MCV 92.2 91.1   MCH 27.7 28.0   MCHC 30.1* 30.7*   RDW 54.7* 53.3*   PLATELETCT 217 218   MPV 11.2 11.1     Recent Labs     04/16/21  0625 04/17/21 0212 04/18/21 0214   SODIUM 141 138 137   POTASSIUM 4.2 3.8 3.8   CHLORIDE 103 99 101   CO2 34* 34* 32   GLUCOSE 142* 128* 109*   BUN 19 24* 24*   CREATININE 0.90 1.06 0.88    CALCIUM 9.3 9.4 9.4     Recent Labs     04/17/21  0212 04/18/21  0214   INR 1.59* 1.61*               Imaging  DX-CHEST-PORTABLE (1 VIEW)   Final Result         1.  Hazy left lung base density suggests infiltrates   2.  Cardiomegaly           Assessment/Plan  * Acute respiratory failure (HCC)- (present on admission)  Assessment & Plan  Acute on chronic hypoxic respiratory failure  The patient transiently on BiPAP, she does have a home assistive device  Improved with slight diuresis and ongoing BiPAP/CPAP  HOB > 30  Early mobility  CXR: no discreet infiltrate and this does not appear to be infectious;  4/17/2021:  Continue with BiPAP CPAP as needed.  Patient requiring only supplemental oxygen 1 to 2 L presently which is normal baseline.  Acute exacerbation seemingly resolving.  1825 1:  Patient seemingly at baseline continue present management.    A-fib (HCC)- (present on admission)  Assessment & Plan  Paroxysmal, monitor, continue anticoagulation  Consider beta-blockade  4/17/2021  Continue present anticoagulation.  4/18/2021:  Continue present medical management.  Patient with uptrending INR continue warfarin dosing as per pharmacy.    KATHIA and COPD overlap syndrome (HCC)- (present on admission)  Assessment & Plan  NIPPV  Bronchodilators  Continue home medications  Needs placement in long term care facility  Continue CPAP/BiPAP  Brief course of steroids  4/17/2021:  Patient requiring only supplemental oxygen 1 to 2 L presently.  Apparently 1 to 2 L is her normal baseline.  Continue present management.  Patient not requiring BiPAP at present.    DM2 (diabetes mellitus, type 2) (HCC)- (present on admission)  Assessment & Plan  Glucose 172 on arrival  ISS  accuchecks  4/17/2025 1:  Continue present management.      Pulmonary hypertension (HCC)- (present on admission)  Assessment & Plan  Not evident on current echocardiogram    Debility- (present on admission)  Assessment & Plan  SW consult  Unable to perform ADL's  without assistance  Palliative care consult  4/17/2021:  Palliative recommendations none noted appreciated.  Pending acceptance of home health.  Patient will require ongoing care.    Class 3 severe obesity due to excess calories with body mass index (BMI) of 50.0 to 59.9 in adult (HCC)- (present on admission)  Assessment & Plan  Contributing to respiratory failure  Consider nutrition consult    Hx of  Bipolar affective disorder, and schizophrenia- (present on admission)  Assessment & Plan  Continue prior home medication regimen     VTE prophylaxis: Coumadin

## 2021-04-19 NOTE — PROGRESS NOTES
AA&Ox4.    SpO2 95% on 2L O2 via NC. Denies SOB.    Reporting 0/10 pain. Medicated per MAR.    SKIN Heels are calloused/cracking, BLE are red/swollen, 2+ pitting edema, shiny, Moisture wicking pads placed under pt, Pannus and underneath bilateral breasts are red/moist  Interdry placed, Sacrum is red/intact and blanching.     Tolerating Regular diet. Denies N/V.    + void.    + BM / LBM 4/18/2021.    Pt ambulates/up with stand by assist.    All needs met at this time. Call light within reach. Pt calls appropriately.

## 2021-04-19 NOTE — PROGRESS NOTES
Pt is A+Ox4    Baseline mild tremors noted    +N/T in BLE   Denies pain  VSS w/ intermittent bradycardia   2 liters O2 nasal canula, RT set up CPAP at bedside   LBM 04/18 +void    Per transferring RN, pt is incontinent at night and uses a purewick    New system in place, pads placed under pt   Ambulating as  SBA w/ the FWW, pt has not ambulated yet on floor   See skin note for assessment   Pt updated on POC. appropriate fall precautions in place, bed alarm on, all needs met at this time   Hourly rounding in place

## 2021-04-19 NOTE — DISCHARGE PLANNING
Anticipated Discharge Disposition:   Home    Action:   This RN CM is following the case. Spoke with Amarilis of . Pt has an appointment with the Anticoagulation Clinic on 4/21/21 at 08:30 a.m. at [facility] [address]     Barriers to Discharge:   Pending medical clearance    Plan:   Will continue to assist Pt with discharge as needed.

## 2021-04-19 NOTE — PROGRESS NOTES
Inpatient Anticoagulation Service Note  Date: 4/19/2021  Reason for Anticoagulation: Atrial Fibrillation   [id number] VASc Score: 2  HAS-BLED Score: 1   Hemoglobin Value: 12.9  Hematocrit Value: 43.3  Lab Platelet Value: 177  Target INR: 2.0 to 3.0  INR from last 7 days     Date/Time INR Value    04/19/21 0223  (!) 1.7    04/18/21 0214  (!) 1.61    04/17/21 0212  (!) 1.59    04/15/21 1508  (!) 1.24    04/14/21 2038  0.93        Dose from last 7 days     Date/Time Dose (mg)    04/19/21 1543  6    04/18/21 0841  9    04/17/21 1604  6    04/16/21 1558  6    04/15/21 1358  9        Average Dose (mg): TBD (Home Dose: 6 mg daily per [facility])  Significant Interactions: Statin, Corticosteroids  Bridge Therapy: No  Reversal Agent Administered: Not Applicable  Comments: INR below goal. No overt bleeding noted per chart review. H/H improved. PLT down. Warfarin interactions noted. Will give warfarin 6 mg today. INR with AM labs. May need acute dose adjustments.    Plan:  Warfarin 6 mg 4/19/21  Education Material Provided?: No (chronic warfarin patient)  Pharmacist suggested discharge dosing: warfarin 6 mg daily    Nader Mullins, PharmD

## 2021-04-20 ENCOUNTER — PHARMACY VISIT (OUTPATIENT)
Dept: PHARMACY | Facility: MEDICAL CENTER | Age: 46
End: 2021-04-20
Payer: COMMERCIAL

## 2021-04-20 ENCOUNTER — PATIENT OUTREACH (OUTPATIENT)
Dept: HEALTH INFORMATION MANAGEMENT | Facility: OTHER | Age: 46
End: 2021-04-20

## 2021-04-20 VITALS
HEIGHT: 64 IN | TEMPERATURE: 97.5 F | SYSTOLIC BLOOD PRESSURE: 113 MMHG | DIASTOLIC BLOOD PRESSURE: 62 MMHG | WEIGHT: 281.09 LBS | OXYGEN SATURATION: 94 % | HEART RATE: 60 BPM | BODY MASS INDEX: 47.99 KG/M2 | RESPIRATION RATE: 16 BRPM

## 2021-04-20 LAB
INR PPP: 1.98 (ref 0.87–1.13)
PROTHROMBIN TIME: 23.1 SEC (ref 12–14.6)

## 2021-04-20 PROCEDURE — 5A09357 ASSISTANCE WITH RESPIRATORY VENTILATION, LESS THAN 24 CONSECUTIVE HOURS, CONTINUOUS POSITIVE AIRWAY PRESSURE: ICD-10-PCS | Performed by: INTERNAL MEDICINE

## 2021-04-20 PROCEDURE — 94640 AIRWAY INHALATION TREATMENT: CPT

## 2021-04-20 PROCEDURE — A9270 NON-COVERED ITEM OR SERVICE: HCPCS | Performed by: STUDENT IN AN ORGANIZED HEALTH CARE EDUCATION/TRAINING PROGRAM

## 2021-04-20 PROCEDURE — A9270 NON-COVERED ITEM OR SERVICE: HCPCS | Performed by: INTERNAL MEDICINE

## 2021-04-20 PROCEDURE — 700102 HCHG RX REV CODE 250 W/ 637 OVERRIDE(OP): Performed by: STUDENT IN AN ORGANIZED HEALTH CARE EDUCATION/TRAINING PROGRAM

## 2021-04-20 PROCEDURE — A9270 NON-COVERED ITEM OR SERVICE: HCPCS | Performed by: HOSPITALIST

## 2021-04-20 PROCEDURE — 94760 N-INVAS EAR/PLS OXIMETRY 1: CPT

## 2021-04-20 PROCEDURE — 700102 HCHG RX REV CODE 250 W/ 637 OVERRIDE(OP): Performed by: HOSPITALIST

## 2021-04-20 PROCEDURE — 94660 CPAP INITIATION&MGMT: CPT

## 2021-04-20 PROCEDURE — 700111 HCHG RX REV CODE 636 W/ 250 OVERRIDE (IP): Performed by: EMERGENCY MEDICINE

## 2021-04-20 PROCEDURE — 700102 HCHG RX REV CODE 250 W/ 637 OVERRIDE(OP): Performed by: INTERNAL MEDICINE

## 2021-04-20 PROCEDURE — 85610 PROTHROMBIN TIME: CPT

## 2021-04-20 PROCEDURE — 99239 HOSP IP/OBS DSCHRG MGMT >30: CPT | Performed by: INTERNAL MEDICINE

## 2021-04-20 PROCEDURE — RXMED WILLOW AMBULATORY MEDICATION CHARGE: Performed by: INTERNAL MEDICINE

## 2021-04-20 RX ORDER — METOPROLOL SUCCINATE 25 MG/1
25 TABLET, EXTENDED RELEASE ORAL DAILY
Qty: 30 TABLET | Refills: 1 | Status: SHIPPED | OUTPATIENT
Start: 2021-04-21 | End: 2021-07-22

## 2021-04-20 RX ADMIN — NICOTINE TRANSDERMAL SYSTEM 21 MG: 21 PATCH, EXTENDED RELEASE TRANSDERMAL at 05:50

## 2021-04-20 RX ADMIN — TIOTROPIUM BROMIDE INHALATION SPRAY 5 MCG: 3.12 SPRAY, METERED RESPIRATORY (INHALATION) at 07:11

## 2021-04-20 RX ADMIN — FUROSEMIDE 20 MG: 20 TABLET ORAL at 05:51

## 2021-04-20 RX ADMIN — METOPROLOL SUCCINATE 25 MG: 25 TABLET, EXTENDED RELEASE ORAL at 05:51

## 2021-04-20 RX ADMIN — DOCUSATE SODIUM 50 MG AND SENNOSIDES 8.6 MG 2 TABLET: 8.6; 5 TABLET, FILM COATED ORAL at 05:51

## 2021-04-20 RX ADMIN — ARIPIPRAZOLE 30 MG: 15 TABLET ORAL at 05:51

## 2021-04-20 RX ADMIN — FLUTICASONE PROPIONATE 50 MCG: 50 SPRAY, METERED NASAL at 05:51

## 2021-04-20 RX ADMIN — LITHIUM CARBONATE 300 MG: 300 TABLET, FILM COATED, EXTENDED RELEASE ORAL at 05:51

## 2021-04-20 RX ADMIN — BUDESONIDE 0.5 MG: 0.5 SUSPENSION RESPIRATORY (INHALATION) at 07:10

## 2021-04-20 ASSESSMENT — COGNITIVE AND FUNCTIONAL STATUS - GENERAL
PERSONAL GROOMING: A LITTLE
MOVING TO AND FROM BED TO CHAIR: A LITTLE
SUGGESTED CMS G CODE MODIFIER MOBILITY: CK
EATING MEALS: A LITTLE
TURNING FROM BACK TO SIDE WHILE IN FLAT BAD: A LITTLE
DRESSING REGULAR UPPER BODY CLOTHING: A LITTLE
TOILETING: A LITTLE
SUGGESTED CMS G CODE MODIFIER DAILY ACTIVITY: CK
WALKING IN HOSPITAL ROOM: A LITTLE
CLIMB 3 TO 5 STEPS WITH RAILING: A LITTLE
DRESSING REGULAR LOWER BODY CLOTHING: A LITTLE
STANDING UP FROM CHAIR USING ARMS: A LITTLE
HELP NEEDED FOR BATHING: A LITTLE
MOBILITY SCORE: 18
DAILY ACTIVITIY SCORE: 18
MOVING FROM LYING ON BACK TO SITTING ON SIDE OF FLAT BED: A LITTLE

## 2021-04-20 ASSESSMENT — PAIN DESCRIPTION - PAIN TYPE
TYPE: ACUTE PAIN
TYPE: ACUTE PAIN

## 2021-04-20 ASSESSMENT — FIBROSIS 4 INDEX: FIB4 SCORE: 0.29

## 2021-04-20 NOTE — PROGRESS NOTES
Pt AO x 4  Vitals signs stable  Pt denies chest pain or SOB  O2 sat >90% on 1-2L breathing unlabored, CPAP in place while sleeping.   Pt denies pain.   Pt denies N/V/D  + voiding, + flatus, + bowel sounds  Pt ambulates with standby assistance, and FWW for longer walks    Updated plan of care discussed with pt. Safety education done. Falls precautions in place.   Pt safety maintained. Hourly rounding done.

## 2021-04-20 NOTE — PROGRESS NOTES
Discharge instructions reviewed and questions answered. PIVs removed and catheters intact. Pt has portable tanks at home and this RN requested for mother to retrieve portable tank at home for discharge. Pt's mother will get portable tank. Pt notified that she has INR appt tomorrow for 4/21 at 830. Pt awaiting ride.

## 2021-04-20 NOTE — DISCHARGE INSTRUCTIONS
Discharge Instructions    Discharged to home by car with relative. Discharged via wheelchair, hospital escort: Yes.  Special equipment needed: Not Applicable    Be sure to schedule a follow-up appointment with your primary care doctor or any specialists as instructed.     Discharge Plan:   Diet Plan: Discussed  Activity Level: Discussed  Confirmed Follow up Appointment: Patient to Call and Schedule Appointment  Confirmed Symptoms Management: Discussed  Medication Reconciliation Updated: Yes    I understand that a diet low in cholesterol, fat, and sodium is recommended for good health. Unless I have been given specific instructions below for another diet, I accept this instruction as my diet prescription.   Other diet: regular diet    Special Instructions: None    · Is patient discharged on Warfarin / Coumadin?   No     Depression / Suicide Risk    As you are discharged from this [facility] facility, it is important to learn how to keep safe from harming yourself.    Recognize the warning signs:  · Abrupt changes in personality, positive or negative- including increase in energy   · Giving away possessions  · Change in eating patterns- significant weight changes-  positive or negative  · Change in sleeping patterns- unable to sleep or sleeping all the time   · Unwillingness or inability to communicate  · Depression  · Unusual sadness, discouragement and loneliness  · Talk of wanting to die  · Neglect of personal appearance   · Rebelliousness- reckless behavior  · Withdrawal from people/activities they love  · Confusion- inability to concentrate     If you or a loved one observes any of these behaviors or has concerns about self-harm, here's what you can do:  · Talk about it- your feelings and reasons for harming yourself  · Remove any means that you might use to hurt yourself (examples: pills, rope, extension cords, firearm)  · Get professional help from the community (Mental Health, Substance Abuse, psychological  counseling)  · Do not be alone:Call your Safe Contact- someone whom you trust who will be there for you.  · Call your local CRISIS HOTLINE 944-0887 or 938-034-5690  · Call your local Children's Mobile Crisis Response Team [place] (353) 300-1813 or www.[employer]  · Call the toll free National Suicide Prevention Hotlines   · National Suicide Prevention Lifeline 135-132-CNUJ (0865)  · National Hope Line Network 800-SUICIDE (112-8466)    Metoprolol tablets  What is this medicine?  METOPROLOL (me TOE proe lole) is a beta-blocker. Beta-blockers reduce the workload on the heart and help it to beat more regularly. This medicine is used to treat high blood pressure and to prevent chest pain. It is also used to after a heart attack and to prevent an additional heart attack from occurring.  This medicine may be used for other purposes; ask your health care provider or pharmacist if you have questions.  COMMON BRAND NAME(S): Lopressor  What should I tell my health care provider before I take this medicine?  They need to know if you have any of these conditions:  · diabetes  · heart or vessel disease like slow heart rate, worsening heart failure, heart block, sick sinus syndrome or Raynaud's disease  · kidney disease  · liver disease  · lung or breathing disease, like asthma or emphysema  · pheochromocytoma  · thyroid disease  · an unusual or allergic reaction to metoprolol, other beta-blockers, medicines, foods, dyes, or preservatives  · pregnant or trying to get pregnant  · breast-feeding  How should I use this medicine?  Take this medicine by mouth with a drink of water. Follow the directions on the prescription label. Take this medicine immediately after meals. Take your doses at regular intervals. Do not take more medicine than directed. Do not stop taking this medicine suddenly. This could lead to serious heart-related effects.  Talk to your pediatrician regarding the use of this medicine in children. Special  care may be needed.  Overdosage: If you think you have taken too much of this medicine contact a poison control center or emergency room at once.  NOTE: This medicine is only for you. Do not share this medicine with others.  What if I miss a dose?  If you miss a dose, take it as soon as you can. If it is almost time for your next dose, take only that dose. Do not take double or extra doses.  What may interact with this medicine?  This medicine may interact with the following medications:  · certain medicines for blood pressure, heart disease, irregular heart beat  · certain medicines for depression like monoamine oxidase (MAO) inhibitors, fluoxetine, or paroxetine  · clonidine  · dobutamine  · epinephrine  · isoproterenol  · reserpine  This list may not describe all possible interactions. Give your health care provider a list of all the medicines, herbs, non-prescription drugs, or dietary supplements you use. Also tell them if you smoke, drink alcohol, or use illegal drugs. Some items may interact with your medicine.  What should I watch for while using this medicine?  Visit your doctor or health care professional for regular check ups. Contact your doctor right away if your symptoms worsen. Check your blood pressure and pulse rate regularly. Ask your health care professional what your blood pressure and pulse rate should be, and when you should contact them.  You may get drowsy or dizzy. Do not drive, use machinery, or do anything that needs mental alertness until you know how this medicine affects you. Do not sit or stand up quickly, especially if you are an older patient. This reduces the risk of dizzy or fainting spells. Contact your doctor if these symptoms continue. Alcohol may interfere with the effect of this medicine. Avoid alcoholic drinks.  This medicine may increase blood sugar. Ask your healthcare provider if changes in diet or medicines are needed if you have diabetes.  What side effects may I notice  from receiving this medicine?  Side effects that you should report to your doctor or health care professional as soon as possible:  · allergic reactions like skin rash, itching or hives  · cold or numb hands or feet  · depression  · difficulty breathing  · faint  · fever with sore throat  · irregular heartbeat, chest pain  · rapid weight gain  ·   signs and symptoms of high blood sugar such as being more thirsty or hungry or having to urinate more than normal. You may also feel very tired or have blurry vision.  · swollen legs or ankles  Side effects that usually do not require medical attention (report to your doctor or health care professional if they continue or are bothersome):  · anxiety or nervousness  · change in sex drive or performance  · dry skin  · headache  · nightmares or trouble sleeping  · short term memory loss  · stomach upset or diarrhea  This list may not describe all possible side effects. Call your doctor for medical advice about side effects. You may report side effects to FDA at 7-275-DOZ-0346.  Where should I keep my medicine?  Keep out of the reach of children.  Store at room temperature between 15 and 30 degrees C (59 and 86 degrees F). Throw away any unused medicine after the expiration date.  NOTE: This sheet is a summary. It may not cover all possible information. If you have questions about this medicine, talk to your doctor, pharmacist, or health care provider.  © 2020 [employer]vier/Gold Standard (2019-10-08 11:15:23)

## 2021-04-20 NOTE — PROGRESS NOTES
AA&Ox4.     SpO2 99% on 2L O2 via NC. Denies SOB.     Reporting 3/10 pain. Medicated per MAR.     SKIN Heels are calloused/cracking, BLE are red/swollen, 2+ pitting edema, shiny, Moisture wicking pads placed under pt, Pannus and underneath bilateral breasts are red/moist  Interdry placed, Sacrum is red/intact and blanching.      Tolerating Regular diet. Denies N/V.     + void.     + BM / LBM 4/18/2021.     Pt ambulates/up with stand by assist.     All needs met at this time. Call light within reach. Pt calls appropriately.

## 2021-04-20 NOTE — CARE PLAN
Problem: Safety  Goal: Will remain free from injury  Outcome: PROGRESSING AS EXPECTED  Note: Falls education done. Pt demonstrates appropriate call light use. Bed alarm N/A, low fall risk. Pt ambulates with standby assistance. Hourly rounding done. Pt safety maintained.      Problem: Respiratory:  Goal: Respiratory status will improve  Outcome: PROGRESSING AS EXPECTED  Note: Satting >90% on 1-2L, breathing unlabored, lungs diminished. CPAP in place for sleep. Pt educated on IS use. Pt needs continued encouragement.

## 2021-04-20 NOTE — PROGRESS NOTES
Pt's mother was sent to retrieve home O2 portable tank. This RN went to connect patient to portable tank and portable tank empty. Pt reports that she has another full tank at home and concentrator. Pt wants to discharge and will connect with concentrator at home. Pt will buy pulse ox and monitor O2 levels at home. Pt has HH pending per Olga GREENE but patient can discharge per MD.   This RN spoke with Shanon GREENE and patient was 100% RA and patient currently 94% RA. Olga GREENE notified that patient wears O2 at night. Pt transported home with mother.

## 2021-04-20 NOTE — DISCHARGE PLANNING
Anticipated Discharge Disposition:   Home     Action:   This RN CM is following the case. Assisted Pt with Approved Service for Medication ( Metoprolol SR 25 mg) and the cost is $ 8.28. Approved Service was faxed to [facility].    Pt is still pending Home Health acceptance, barrier is Medicaid,  Pt has an appointment with Anticoagulation Clinic tomorrow at  4/21/21 at 08:30 am.    Barriers to Discharge:   Pending medical clearance    Plan:   Will continue to assist Pt with discharge as needed

## 2021-04-20 NOTE — PROGRESS NOTES
Inpatient Anticoagulation Service Note  Date: 4/20/2021  Reason for Anticoagulation: Atrial Fibrillation   [id number] VASc Score: 2  HAS-BLED Score: 1   Hemoglobin Value: 12.9  Hematocrit Value: 43.3  Lab Platelet Value: 177  Target INR: 2.0 to 3.0  INR from last 7 days     Date/Time INR Value    04/20/21 1104  (!) 1.98    04/19/21 0223  (!) 1.7    04/18/21 0214  (!) 1.61    04/17/21 0212  (!) 1.59    04/15/21 1508  (!) 1.24    04/14/21 2038  0.93        Dose from last 7 days     Date/Time Dose (mg)    04/20/21 1301  6    04/19/21 1543  6    04/18/21 0841  9    04/17/21 1604  6    04/16/21 1558  6    04/15/21 1358  9        Average Dose (mg): TBD (Home Dose: 6 mg daily per [facility])  Significant Interactions: Statin, Corticosteroids  Bridge Therapy: No  Reversal Agent Administered: Not Applicable  Comments: INR improved but below goal. No overt bleeding noted per chart review. No new H/H or PLT yet today. Warfarin interactions noted. Will give warfarin 6 mg today. INR with AM labs. May need acute dose adjustments.    Plan:  Warfarin 6 mg 4/20/21  Education Material Provided?: No (chronic warfarin patient)  Pharmacist suggested discharge dosing: warfarin 6 mg daily    Nader Mullins, PharmD

## 2021-04-20 NOTE — DISCHARGE SUMMARY
Discharge Summary    CHIEF COMPLAINT ON ADMISSION  Chief Complaint   Patient presents with   • Shortness of Breath       Reason for Admission  EMS 08     Admission Date  4/14/2021    CODE STATUS  Full Code    HPI & HOSPITAL COURSE  This is a 45 y.o. female here with above medical issues. Patient has a history of COPD and chronic respiratory failure with hypoxia.  She was found to have worsening hypoxia with KATHIA and COPD overlap syndrome. She was placed on BiPAP and admitted to the ICU on admission. Chest xray did not show a clear infiltrate with no clear signs or symptoms of heart failure either. She received numerous bronchodilators and her morbid obesity is certainly a contributing factor. She stabilized and was transferred to the floor. Patient returned to her chronic oxygen needs. She has home health set up for the patient and already has home oxygen. Patient continues to have recurrent admissions and eventually would likely benefit from a group home. In addition to home health she has caregivers at home and family help.   No notes on file    Therefore, she is discharged in fair and stable condition to home with close outpatient follow-up.    The patient met 2-midnight criteria for an inpatient stay at the time of discharge.    Discharge Date  4/20/2021    FOLLOW UP ITEMS POST DISCHARGE  F/U with PCP in 1-2 weeks    DISCHARGE DIAGNOSES  Principal Problem:    Acute on chronic respiratory failure with hypoxia (HCC) POA: Yes  Active Problems:    COPD (chronic obstructive pulmonary disease) (HCC) POA: Yes    KATHIA and COPD overlap syndrome (HCC) POA: Yes    Debility POA: Yes    Paroxysmal atrial fibrillation (HCC) POA: Yes    Bilateral edema of lower extremity POA: Yes    Hyperglycemia POA: Yes    Bipolar Disorder POA: Yes    Morbid obesity (HCC) POA: Yes    Schizophrenia (HCC) POA: Yes  Resolved Problems:    * No resolved hospital problems. *      FOLLOW UP  Future Appointments   Date Time Provider Department Center    4/21/2021  8:30 AM [facility] None   5/17/2021  1:10 PM CRISTOBAL Barth [facility] None   7/14/2021  2:00 PM Oz Borrego M.D. [facility] None     SHANKAR Gleason  [address]  [place] [id number] [phone]      Please call to schedule a follow up apt with your primary provider. Thank you.    Angelita Posada P.A.-C.  [address]  [address]  [place] [address] [phone]    Schedule an appointment as soon as possible for a visit  Discuss Zio patch for afib      MEDICATIONS ON DISCHARGE     Medication List      START taking these medications      Instructions   metoprolol SR 25 MG Tb24  Start taking on: April 21, 2021  Commonly known as: TOPROL XL   Take 1 tablet by mouth every day.  Dose: 25 mg        CHANGE how you take these medications      Instructions   nystatin powder  What changed:   · how much to take  · how to take this  · when to take this  · reasons to take this  · additional instructions  Commonly known as: MYCOSTATIN   Apply underneath breasts and on inner thigh area twice a day for fungal infection        CONTINUE taking these medications      Instructions   Abilify 30 MG tablet  Generic drug: aripiprazole   Take 30 mg by mouth every morning.  Dose: 30 mg     acetaminophen 500 MG Tabs  Commonly known as: TYLENOL   Take 1,000 mg by mouth one time.  Dose: 1,000 mg     atorvastatin 20 MG Tabs  Commonly known as: LIPITOR   Take 1 Tab by mouth every day.  Dose: 20 mg     fluticasone 50 MCG/ACT nasal spray  Commonly known as: FLONASE   Administer 1 Spray into affected nostril(S) 2 times a day.  Dose: 1 Spray     furosemide 20 MG Tabs  Commonly known as: LASIX   Take 1 tablet by mouth 2 times a day.  Dose: 20 mg     lithium  MG Tbcr tablet  Commonly known as: Lithobid   Take 300-600 mg by mouth 2 Times a Day. 1 caps = 300 mg every morning  2 caps = 600 mg every evening  Dose: 300-600 mg     montelukast 10 MG Tabs  Commonly known as: SINGULAIR   Take 1 Tab by mouth every  bedtime.  Dose: 10 mg     potassium chloride SA 20 MEQ Tbcr  Commonly known as: Kdur   Take 1 tablet by mouth every day.  Dose: 20 mEq     Spiriva HandiHaler 18 MCG Caps  Generic drug: tiotropium   Inhale 1 Cap by mouth every day.  Dose: 18 mcg     Ventolin  (90 Base) MCG/ACT Aers inhalation aerosol  Generic drug: albuterol   Inhale 2 Puffs every 6 hours as needed for Shortness of Breath.  Dose: 2 Puff     warfarin 6 MG Tabs  Commonly known as: COUMADIN   Take 1 tablet by mouth daily or as directed by anticoagulation clinic            Allergies  Allergies   Allergen Reactions   • Amoxicillin Rash and Itching     Tolerates cephalosporins, pt reports that she gets a rash all over her body and gets itchy   • Penicillin G Rash and Itching     pt reports that she gets a rash all over her body and gets itchy       DIET  Orders Placed This Encounter   Procedures   • Diet Order Diet: Regular     Standing Status:   Standing     Number of Occurrences:   1     Order Specific Question:   Diet:     Answer:   Regular [1]   • Discontinue Diet Tray     Standing Status:   Standing     Number of Occurrences:   1       ACTIVITY  As tolerated.  Weight bearing as tolerated    CONSULTATIONS  ICU    PROCEDURES  DX-CHEST-PORTABLE (1 VIEW)   Final Result         1.  Hazy left lung base density suggests infiltrates   2.  Cardiomegaly            LABORATORY  Lab Results   Component Value Date    SODIUM 141 04/19/2021    POTASSIUM 4.9 04/19/2021    CHLORIDE 101 04/19/2021    CO2 27 04/19/2021    GLUCOSE 222 (H) 04/19/2021    BUN 22 04/19/2021    CREATININE 0.90 04/19/2021    CREATININE 0.9 09/11/2008        Lab Results   Component Value Date    WBC 10.7 04/19/2021    HEMOGLOBIN 12.9 04/19/2021    HEMATOCRIT 43.3 04/19/2021    PLATELETCT 177 04/19/2021        Total time of the discharge process exceeds 34 minutes.

## 2021-04-20 NOTE — CARE PLAN
Problem: Communication  Goal: The ability to communicate needs accurately and effectively will improve  Outcome: MET     Problem: Safety  Goal: Will remain free from injury  Outcome: MET  Goal: Will remain free from falls  Outcome: MET     Problem: Infection  Goal: Will remain free from infection  Outcome: MET     Problem: Venous Thromboembolism (VTW)/Deep Vein Thrombosis (DVT) Prevention:  Goal: Patient will participate in Venous Thrombosis (VTE)/Deep Vein Thrombosis (DVT)Prevention Measures  Outcome: MET     Problem: Bowel/Gastric:  Goal: Normal bowel function is maintained or improved  Outcome: MET  Goal: Will not experience complications related to bowel motility  Outcome: MET     Problem: Knowledge Deficit  Goal: Knowledge of disease process/condition, treatment plan, diagnostic tests, and medications will improve  Outcome: MET  Goal: Knowledge of the prescribed therapeutic regimen will improve  Outcome: MET     Problem: Discharge Barriers/Planning  Goal: Patient's continuum of care needs will be met  Outcome: MET     Problem: Respiratory:  Goal: Respiratory status will improve  Outcome: MET     Problem: Skin Integrity  Goal: Risk for impaired skin integrity will decrease  Outcome: MET     Problem: Pain Management  Goal: Pain level will decrease to patient's comfort goal  Outcome: MET     Problem: Urinary Elimination:  Goal: Ability to reestablish a normal urinary elimination pattern will improve  Outcome: MET     Problem: Psychosocial Needs:  Goal: Level of anxiety will decrease  Outcome: MET

## 2021-04-20 NOTE — DISCHARGE PLANNING
Meds-to-Beds: Discharge prescription orders listed below delivered to patient's bedside. RN Olga notified. Patient counseled. She states she has taken warfarin previously and is familiar with the medication. Provided warfarin  education booklet. Approved services for metoprolol SR 25mg per CM.     Lorene Haro Bonnie   Home Medication Instructions TRACIE:[id number]    Printed on:04/20/21 1228   Medication Information                      metoprolol SR (TOPROL XL) 25 MG TABLET SR 24 HR  Take 1 tablet by mouth every day.             warfarin (COUMADIN) 6 MG Tab  Take 1 tablet by mouth daily or as directed by anticoagulation clinic                 Jenniffer Riley, PharmD

## 2021-04-21 ENCOUNTER — ANTICOAGULATION VISIT (OUTPATIENT)
Dept: VASCULAR LAB | Facility: MEDICAL CENTER | Age: 46
End: 2021-04-21
Attending: INTERNAL MEDICINE
Payer: MEDICAID

## 2021-04-21 DIAGNOSIS — I48.0 PAROXYSMAL ATRIAL FIBRILLATION (HCC): ICD-10-CM

## 2021-04-21 LAB — INR PPP: 2.3 (ref 2–3.5)

## 2021-04-21 PROCEDURE — 99212 OFFICE O/P EST SF 10 MIN: CPT

## 2021-04-21 PROCEDURE — 85610 PROTHROMBIN TIME: CPT

## 2021-04-21 NOTE — PROGRESS NOTES
Anticoagulation Summary  As of 2021    INR goal:  2.0-3.0   TTR:  0.0 % (6 d)   INR used for dosin.30 (2021)   Warfarin maintenance plan:  9 mg (6 mg x 1.5) every Mon, Fri; 6 mg (6 mg x 1) all other days   Weekly warfarin total:  48 mg   Plan last modified:  Ciro Mohamud, PharmD (2021)   Next INR check:  2021   Target end date:  Indefinite    Indications    Paroxysmal atrial fibrillation (HCC) [I48.0]             Anticoagulation Episode Summary     INR check location:      Preferred lab:      Send INR reminders to:      Comments:          Anticoagulation Patient Findings      HPI:  Lorene Haro seen in clinic today, on anticoagulation therapy with warfarin for PAF.   Pt is not on antiplatelet therapy.  Changes to current medical/health status since last appt: Recently hospitalized, I used warfarin dosing from Pharmacy notes to update our calendar.   Denies signs/symptoms of bleeding and/or thrombosis since the last appt.    Denies any interval changes to diet  Denies any interval changes to medications since last appt.   Denies any complications or cost restrictions with current therapy.   Pt declines vitals due to Covid transmission concerns.    Confirmed dosing regimen.     A/P   INR  therapeutic.   Begin increased regimen based on recent warfarin dosing.     Follow up appointment in 1 week(s).    Ciro Mohamud, HildaD

## 2021-04-23 LAB — INR BLD: 2.3 (ref 0.9–1.2)

## 2021-04-26 ENCOUNTER — PATIENT OUTREACH (OUTPATIENT)
Dept: HEALTH INFORMATION MANAGEMENT | Facility: OTHER | Age: 46
End: 2021-04-26

## 2021-04-26 VITALS
HEART RATE: 75 BPM | SYSTOLIC BLOOD PRESSURE: 133 MMHG | DIASTOLIC BLOOD PRESSURE: 84 MMHG | WEIGHT: 284.39 LBS | HEIGHT: 61 IN | OXYGEN SATURATION: 98 % | BODY MASS INDEX: 53.69 KG/M2 | RESPIRATION RATE: 14 BRPM | TEMPERATURE: 96.8 F

## 2021-04-26 LAB
ALBUMIN SERPL BCP-MCNC: 3.8 G/DL (ref 3.2–4.9)
ALBUMIN/GLOB SERPL: 1.2 G/DL
ALP SERPL-CCNC: 105 U/L (ref 30–99)
ALT SERPL-CCNC: 26 U/L (ref 2–50)
ANION GAP SERPL CALC-SCNC: 9 MMOL/L (ref 7–16)
AST SERPL-CCNC: 14 U/L (ref 12–45)
BASOPHILS # BLD AUTO: 0.2 % (ref 0–1.8)
BASOPHILS # BLD: 0.02 K/UL (ref 0–0.12)
BILIRUB SERPL-MCNC: 0.7 MG/DL (ref 0.1–1.5)
BUN SERPL-MCNC: 7 MG/DL (ref 8–22)
CALCIUM SERPL-MCNC: 9.3 MG/DL (ref 8.5–10.5)
CHLORIDE SERPL-SCNC: 101 MMOL/L (ref 96–112)
CO2 SERPL-SCNC: 30 MMOL/L (ref 20–33)
CREAT SERPL-MCNC: 1 MG/DL (ref 0.5–1.4)
EOSINOPHIL # BLD AUTO: 0.16 K/UL (ref 0–0.51)
EOSINOPHIL NFR BLD: 1.2 % (ref 0–6.9)
ERYTHROCYTE [DISTWIDTH] IN BLOOD BY AUTOMATED COUNT: 55.6 FL (ref 35.9–50)
GLOBULIN SER CALC-MCNC: 3.1 G/DL (ref 1.9–3.5)
GLUCOSE SERPL-MCNC: 151 MG/DL (ref 65–99)
HCT VFR BLD AUTO: 42.5 % (ref 37–47)
HGB BLD-MCNC: 12.9 G/DL (ref 12–16)
IMM GRANULOCYTES # BLD AUTO: 0.06 K/UL (ref 0–0.11)
IMM GRANULOCYTES NFR BLD AUTO: 0.5 % (ref 0–0.9)
LIPASE SERPL-CCNC: 19 U/L (ref 11–82)
LYMPHOCYTES # BLD AUTO: 1.81 K/UL (ref 1–4.8)
LYMPHOCYTES NFR BLD: 13.8 % (ref 22–41)
MCH RBC QN AUTO: 27.7 PG (ref 27–33)
MCHC RBC AUTO-ENTMCNC: 30.4 G/DL (ref 33.6–35)
MCV RBC AUTO: 91.2 FL (ref 81.4–97.8)
MONOCYTES # BLD AUTO: 0.75 K/UL (ref 0–0.85)
MONOCYTES NFR BLD AUTO: 5.7 % (ref 0–13.4)
NEUTROPHILS # BLD AUTO: 10.31 K/UL (ref 2–7.15)
NEUTROPHILS NFR BLD: 78.6 % (ref 44–72)
NRBC # BLD AUTO: 0 K/UL
NRBC BLD-RTO: 0 /100 WBC
PLATELET # BLD AUTO: 285 K/UL (ref 164–446)
PMV BLD AUTO: 11 FL (ref 9–12.9)
POTASSIUM SERPL-SCNC: 4 MMOL/L (ref 3.6–5.5)
PROT SERPL-MCNC: 6.9 G/DL (ref 6–8.2)
RBC # BLD AUTO: 4.66 M/UL (ref 4.2–5.4)
SODIUM SERPL-SCNC: 140 MMOL/L (ref 135–145)
WBC # BLD AUTO: 13.1 K/UL (ref 4.8–10.8)

## 2021-04-26 PROCEDURE — 99285 EMERGENCY DEPT VISIT HI MDM: CPT

## 2021-04-26 PROCEDURE — 83690 ASSAY OF LIPASE: CPT

## 2021-04-26 PROCEDURE — 96365 THER/PROPH/DIAG IV INF INIT: CPT

## 2021-04-26 PROCEDURE — 80053 COMPREHEN METABOLIC PANEL: CPT

## 2021-04-26 PROCEDURE — 85025 COMPLETE CBC W/AUTO DIFF WBC: CPT

## 2021-04-26 ASSESSMENT — FIBROSIS 4 INDEX: FIB4 SCORE: 0.29

## 2021-04-27 ENCOUNTER — HOSPITAL ENCOUNTER (EMERGENCY)
Facility: MEDICAL CENTER | Age: 46
End: 2021-04-27
Attending: EMERGENCY MEDICINE
Payer: MEDICAID

## 2021-04-27 ENCOUNTER — APPOINTMENT (OUTPATIENT)
Dept: RADIOLOGY | Facility: MEDICAL CENTER | Age: 46
End: 2021-04-27
Attending: EMERGENCY MEDICINE
Payer: MEDICAID

## 2021-04-27 DIAGNOSIS — D72.829 LEUKOCYTOSIS, UNSPECIFIED TYPE: ICD-10-CM

## 2021-04-27 DIAGNOSIS — R10.84 GENERALIZED ABDOMINAL PAIN: ICD-10-CM

## 2021-04-27 DIAGNOSIS — K59.00 CONSTIPATION, UNSPECIFIED CONSTIPATION TYPE: ICD-10-CM

## 2021-04-27 LAB
APPEARANCE UR: CLEAR
BILIRUB UR QL STRIP.AUTO: NEGATIVE
COLOR UR: YELLOW
GLUCOSE UR STRIP.AUTO-MCNC: NEGATIVE MG/DL
KETONES UR STRIP.AUTO-MCNC: NEGATIVE MG/DL
LEUKOCYTE ESTERASE UR QL STRIP.AUTO: NEGATIVE
MICRO URNS: NORMAL
NITRITE UR QL STRIP.AUTO: NEGATIVE
PH UR STRIP.AUTO: 6 [PH] (ref 5–8)
PROT UR QL STRIP: NEGATIVE MG/DL
RBC UR QL AUTO: NEGATIVE
SP GR UR STRIP.AUTO: 1.02
UROBILINOGEN UR STRIP.AUTO-MCNC: 0.2 MG/DL

## 2021-04-27 PROCEDURE — 700105 HCHG RX REV CODE 258: Performed by: EMERGENCY MEDICINE

## 2021-04-27 PROCEDURE — 74177 CT ABD & PELVIS W/CONTRAST: CPT

## 2021-04-27 PROCEDURE — 700111 HCHG RX REV CODE 636 W/ 250 OVERRIDE (IP): Performed by: EMERGENCY MEDICINE

## 2021-04-27 PROCEDURE — 81003 URINALYSIS AUTO W/O SCOPE: CPT

## 2021-04-27 PROCEDURE — 700117 HCHG RX CONTRAST REV CODE 255: Performed by: EMERGENCY MEDICINE

## 2021-04-27 RX ORDER — SODIUM CHLORIDE 9 MG/ML
INJECTION, SOLUTION INTRAVENOUS CONTINUOUS
Status: DISCONTINUED | OUTPATIENT
Start: 2021-04-27 | End: 2021-04-27 | Stop reason: HOSPADM

## 2021-04-27 RX ORDER — CEPHALEXIN 500 MG/1
500 CAPSULE ORAL 4 TIMES DAILY
Qty: 40 CAPSULE | Refills: 0 | Status: SHIPPED | OUTPATIENT
Start: 2021-04-27 | End: 2021-05-02

## 2021-04-27 RX ORDER — KETOROLAC TROMETHAMINE 30 MG/ML
30 INJECTION, SOLUTION INTRAMUSCULAR; INTRAVENOUS ONCE
Status: DISCONTINUED | OUTPATIENT
Start: 2021-04-27 | End: 2021-04-27 | Stop reason: HOSPADM

## 2021-04-27 RX ORDER — ONDANSETRON 2 MG/ML
4 INJECTION INTRAMUSCULAR; INTRAVENOUS ONCE
Status: DISCONTINUED | OUTPATIENT
Start: 2021-04-27 | End: 2021-04-27 | Stop reason: HOSPADM

## 2021-04-27 RX ADMIN — IOHEXOL 100 ML: 350 INJECTION, SOLUTION INTRAVENOUS at 03:00

## 2021-04-27 RX ADMIN — SODIUM CHLORIDE: 9 INJECTION, SOLUTION INTRAVENOUS at 02:45

## 2021-04-27 RX ADMIN — CEFTRIAXONE SODIUM 2 G: 2 INJECTION, POWDER, FOR SOLUTION INTRAMUSCULAR; INTRAVENOUS at 04:15

## 2021-04-27 SDOH — ECONOMIC STABILITY: FOOD INSECURITY: WITHIN THE PAST 12 MONTHS, YOU WORRIED THAT YOUR FOOD WOULD RUN OUT BEFORE YOU GOT MONEY TO BUY MORE.: SOMETIMES TRUE

## 2021-04-27 SDOH — ECONOMIC STABILITY: FOOD INSECURITY: WITHIN THE PAST 12 MONTHS, THE FOOD YOU BOUGHT JUST DIDN'T LAST AND YOU DIDN'T HAVE MONEY TO GET MORE.: SOMETIMES TRUE

## 2021-04-27 ASSESSMENT — SOCIAL DETERMINANTS OF HEALTH (SDOH): HOW HARD IS IT FOR YOU TO PAY FOR THE VERY BASICS LIKE FOOD, HOUSING, MEDICAL CARE, AND HEATING?: SOMEWHAT HARD

## 2021-04-27 NOTE — ED TRIAGE NOTES
"Chief Complaint   Patient presents with   • Abdominal Pain     Pt states that for the last two days she has had a sharp R upper and lower quadrant abdominal pain.    • Extremity Weakness     Pt needs assistance ambulating, SOB when standing on scale. On 2L NC at home.     /84   Pulse 75   Temp 36 °C (96.8 °F) (Temporal)   Resp 14   Ht 1.549 m (5' 1\")   Wt (!) 129 kg (284 lb 6.3 oz)   LMP  (LMP Unknown)   SpO2 98%   BMI 53.74 kg/m²     Pt wheeled into triage with oxygen tank. Pt requiring FWW for ambulation. Pt SOB during encounter. Placed in the senior lounge and educated on triage process. Labs sent.  "

## 2021-04-27 NOTE — DISCHARGE INSTRUCTIONS
Increase fluids especially water and water based products, high-fiber diet  Continue your current outpatient medications  Watch closely for any signs of fever or changes in your condition.

## 2021-04-27 NOTE — PROGRESS NOTES
Community Health Worker Intake  • Social determinates of health intake Complete.   • Identified barriers to food.  • Contact information provided to Lorene Nicole Tahir   • Has PCP appointment scheduled   • Scheduled Food Delivery  • Outpatient assessment completed.  • Did the patient receive medications post discharge: Yes    ROSA MARIA Pickens called pt to complete outpatient assessment. Pt states she just hasn't been feeling well and that's why shes been returning to ED. Pt states she has a PCP appointment made. Pt requested a food Delivery. Pt identifies no other barriers to resources and expressed no other needs at this time     Plan:  CHW will d/c pt from [facility]

## 2021-04-27 NOTE — ED PROVIDER NOTES
ED Provider Note     Scribed for [place] Mora D.O. by Nimisha Melendez. 2021, 1:57 AM.     Primary care provider: SHANKAR Gleason  Means of arrival: EMS         History obtained from: patient  History limited by: none    CHIEF COMPLAINT  Chief Complaint   Patient presents with   • Abdominal Pain     Pt states that for the last two days she has had a sharp R upper and lower quadrant abdominal pain.    • Extremity Weakness     Pt needs assistance ambulating, SOB when standing on scale. On 2L NC at home.       HPI  Lorene Haro is a 45 y.o. female who presents to the emergency Department for abdominal pain onset 2 days ago. She reports associated nausea. Her last bowel movement was yesterday. She denies any vomiting or fever. She received the COVID vaccine 6 days ago.    REVIEW OF SYSTEMS  Pertinent positives include abdominal pain and nausea. Pertinent negatives include no vomiting or fever.   See HPI for further details. All other systems are negative.    PAST MEDICAL HISTORY  Past Medical History:   Diagnosis Date   • Asthma    • Bipolar 2 disorder (HCC)    • Chickenpox    • Chronic obstructive pulmonary disease (HCC)    • Hypertension    • Other psychological stress    • Schizophrenic disorder (HCC)    • Yeast infection of the skin 2021       FAMILY HISTORY  Family History   Problem Relation Age of Onset   • No Known Problems Mother    • Cancer Sister        SOCIAL HISTORY  Social History     Tobacco Use   • Smoking status: Former Smoker     Packs/day: 1.00     Years: 20.00     Pack years: 20.00     Types: Cigarettes     Quit date: 2020     Years since quittin.3   • Smokeless tobacco: Never Used   • Tobacco comment: Patient states she quit two days ago    Substance Use Topics   • Alcohol use: No   • Drug use: No      Social History     Substance and Sexual Activity   Drug Use No       SURGICAL HISTORY  Past Surgical History:   Procedure Laterality Date   • CHOLECYSTECTOMY     • COLECTOMY      • HYSTERECTOMY LAPAROSCOPY     • KNEE ARTHROSCOPY Left        CURRENT MEDICATIONS  Current Outpatient Medications:   •  metoprolol SR (TOPROL XL) 25 MG TABLET SR 24 HR, Take 1 tablet by mouth every day., Disp: 30 tablet, Rfl: 1  •  acetaminophen (TYLENOL) 500 MG Tab, Take 1,000 mg by mouth one time., Disp: , Rfl:   •  furosemide (LASIX) 20 MG Tab, Take 1 tablet by mouth 2 times a day., Disp: 60 tablet, Rfl: 3  •  potassium chloride SA (KDUR) 20 MEQ Tab CR, Take 1 tablet by mouth every day., Disp: 30 tablet, Rfl: 3  •  warfarin (COUMADIN) 6 MG Tab, Take 1 tablet by mouth daily or as directed by anticoagulation clinic, Disp: 90 tablet, Rfl: 1  •  fluticasone (FLONASE) 50 MCG/ACT nasal spray, Administer 1 Spray into affected nostril(S) 2 times a day., Disp: , Rfl:   •  albuterol 108 (90 Base) MCG/ACT Aero Soln inhalation aerosol, Inhale 2 Puffs every 6 hours as needed for Shortness of Breath., Disp: 18 g, Rfl: 0  •  nystatin (MYCOSTATIN) powder, Apply underneath breasts and on inner thigh area twice a day for fungal infection (Patient taking differently: Apply 1 Application topically 2 times a day as needed (fungal infection).), Disp: 60 g, Rfl: 0  •  montelukast (SINGULAIR) 10 MG Tab, Take 1 Tab by mouth every bedtime., Disp: 30 Tab, Rfl: 11  •  atorvastatin (LIPITOR) 20 MG Tab, Take 1 Tab by mouth every day. (Patient not taking: Reported on 4/14/2021), Disp: 30 Tab, Rfl: 1  •  tiotropium (SPIRIVA HANDIHALER) 18 MCG Cap, Inhale 1 Cap by mouth every day., Disp: 30 Cap, Rfl: 3  •  lithium CR (LITHOBID) 300 MG Tab CR, Take 300-600 mg by mouth 2 Times a Day. 1 caps = 300 mg every morning 2 caps = 600 mg every evening, Disp: , Rfl:   •  aripiprazole (ABILIFY) 30 MG tablet, Take 30 mg by mouth every morning., Disp: , Rfl:     ALLERGIES  Allergies   Allergen Reactions   • Amoxicillin Rash and Itching     Tolerates cephalosporins, pt reports that she gets a rash all over her body and gets itchy   • Penicillin G Rash and  "Itching     pt reports that she gets a rash all over her body and gets itchy       PHYSICAL EXAM  VITAL SIGNS: /84   Pulse 75   Temp 36 °C (96.8 °F) (Temporal)   Resp 14   Ht 1.549 m (5' 1\")   Wt (!) 129 kg (284 lb 6.3 oz)   LMP  (LMP Unknown)   SpO2 98%   BMI 53.74 kg/m²     Constitutional: Patient is well developed, well nourished. Non-toxic appearing. No acute distress. Morbidly obese.   HENT: Normocephalic, atraumatic.Dry oral mucosa.   Eyes: PERRL, EOMI, Conjunctiva without erythema or exudates.   Neck: Supple   Lymphatic: No lymphadenopathy noted.   Cardiovascular: Normal heart rate and Regular rhythm. No murmur  Thorax & Lungs: Clear and equal breath sounds with good excursion. No respiratory distress, no rhonchi, wheezing or rales.   Abdomen: Obese, diffusely tender, Bowel sounds normal in all four quadrants. Soft, no rebound , guarding.  No CVA tenderness.  Skin: Flaky rash on forehead, Warm, Dry, No erythema.   Back: No cervical, thoracic, or lumbosacral tenderness.     Extremities: Peripheral pulses 4/4 No edema, No tenderness.   Neurologic: Alert & oriented x 3, Normal motor function, Normal sensory function  Psychiatric: Affect odd.    DIAGNOSTICS/PROCEDURES    LABS  Results for orders placed or performed during the hospital encounter of 04/27/21   CBC WITH DIFFERENTIAL   Result Value Ref Range    WBC 13.1 (H) 4.8 - 10.8 K/uL    RBC 4.66 4.20 - 5.40 M/uL    Hemoglobin 12.9 12.0 - 16.0 g/dL    Hematocrit 42.5 37.0 - 47.0 %    MCV 91.2 81.4 - 97.8 fL    MCH 27.7 27.0 - 33.0 pg    MCHC 30.4 (L) 33.6 - 35.0 g/dL    RDW 55.6 (H) 35.9 - 50.0 fL    Platelet Count 285 164 - 446 K/uL    MPV 11.0 9.0 - 12.9 fL    Neutrophils-Polys 78.60 (H) 44.00 - 72.00 %    Lymphocytes 13.80 (L) 22.00 - 41.00 %    Monocytes 5.70 0.00 - 13.40 %    Eosinophils 1.20 0.00 - 6.90 %    Basophils 0.20 0.00 - 1.80 %    Immature Granulocytes 0.50 0.00 - 0.90 %    Nucleated RBC 0.00 /100 WBC    Neutrophils (Absolute) 10.31 " (H) 2.00 - 7.15 K/uL    Lymphs (Absolute) 1.81 1.00 - 4.80 K/uL    Monos (Absolute) 0.75 0.00 - 0.85 K/uL    Eos (Absolute) 0.16 0.00 - 0.51 K/uL    Baso (Absolute) 0.02 0.00 - 0.12 K/uL    Immature Granulocytes (abs) 0.06 0.00 - 0.11 K/uL    NRBC (Absolute) 0.00 K/uL   COMP METABOLIC PANEL   Result Value Ref Range    Sodium 140 135 - 145 mmol/L    Potassium 4.0 3.6 - 5.5 mmol/L    Chloride 101 96 - 112 mmol/L    Co2 30 20 - 33 mmol/L    Anion Gap 9.0 7.0 - 16.0    Glucose 151 (H) 65 - 99 mg/dL    Bun 7 (L) 8 - 22 mg/dL    Creatinine 1.00 0.50 - 1.40 mg/dL    Calcium 9.3 8.5 - 10.5 mg/dL    AST(SGOT) 14 12 - 45 U/L    ALT(SGPT) 26 2 - 50 U/L    Alkaline Phosphatase 105 (H) 30 - 99 U/L    Total Bilirubin 0.7 0.1 - 1.5 mg/dL    Albumin 3.8 3.2 - 4.9 g/dL    Total Protein 6.9 6.0 - 8.2 g/dL    Globulin 3.1 1.9 - 3.5 g/dL    A-G Ratio 1.2 g/dL   LIPASE   Result Value Ref Range    Lipase 19 11 - 82 U/L   URINALYSIS    Specimen: Urine, Clean Catch   Result Value Ref Range    Color Yellow     Character Clear     Specific Gravity 1.025 <1.035    Ph 6.0 5.0 - 8.0    Glucose Negative Negative mg/dL    Ketones Negative Negative mg/dL    Protein Negative Negative mg/dL    Bilirubin Negative Negative    Urobilinogen, Urine 0.2 Negative    Nitrite Negative Negative    Leukocyte Esterase Negative Negative    Occult Blood Negative Negative    Micro Urine Req see below    ESTIMATED GFR   Result Value Ref Range    GFR If African American >60 >60 mL/min/1.73 m 2    GFR If Non African American 59 (A) >60 mL/min/1.73 m 2        Labs reviewed by me    RADIOLOGY/PROCEDURES  CT-ABDOMEN-PELVIS WITH   Final Result         1.  No acute abnormality.          Results and radiologist interpretation reviewed by me.     COURSE & MEDICAL DECISION MAKING  Pertinent Labs & Imaging studies reviewed. (See chart for details)    1:57 AM - Patient seen and evaluated at bedside. Ordered for CT-abdomen, CBC with diff, CMP, lipase, and UA to evaluate.  Patient will be treated with Toradol 30 mg and Zofran 4 mg for her symptoms. Differential diagnoses include, but are not limited to, constipation vs gastritis vs cholecystitis.  Patient's labs are really unremarkable other than a slightly elevated white blood cell count of 13.1 with 78% neutrophils.  Her CMP is negative.  Her urinalysis is unremarkable.  CT abdomen and pelvis shows no acute abnormalities.  I am treating the patient empirically with Rocephin and she will be sent home with a prescription for Keflex.  She is to return if any problems or worsening otherwise increase the water and fiber in her diet as her KUB does show increased stool and bowel gas.  She is to take laxatives as needed and return if any problems or worsening.  Otherwise she is to follow-up with [facility] for recheck within the week.  She is stable and improved upon discharge    HYDRATION: Based on the patient's presentation of Dehydration the patient was given IV fluids. IV Hydration was used because oral hydration was not adequate alone. Upon recheck following hydration, the patient was improved.         FINAL IMPRESSION  Constipation  Generalized abdominal pain  Leukocytosis unspecified     Nimisha HARRIS (Ade), am scribing for, and in the presence of, [place] Mora D.O..    Electronically signed by: Nimisha Melendez (Ade), 4/27/2021    I[place] D.O. personally performed the services described in this documentation, as scribed by Nimisha Melendez in my presence, and it is both accurate and complete. C    The note accurately reflects work and decisions made by me.  [place] Mora D.O.  4/27/2021  4:37 AM

## 2021-04-28 ENCOUNTER — ANTICOAGULATION VISIT (OUTPATIENT)
Dept: VASCULAR LAB | Facility: MEDICAL CENTER | Age: 46
End: 2021-04-28
Attending: INTERNAL MEDICINE
Payer: MEDICAID

## 2021-04-28 DIAGNOSIS — I48.0 PAROXYSMAL ATRIAL FIBRILLATION (HCC): ICD-10-CM

## 2021-04-28 LAB
INR BLD: 5.5 (ref 0.9–1.2)
INR PPP: 5.5 (ref 2–3.5)

## 2021-04-28 PROCEDURE — 99212 OFFICE O/P EST SF 10 MIN: CPT | Performed by: PHARMACIST

## 2021-04-28 PROCEDURE — 85610 PROTHROMBIN TIME: CPT

## 2021-04-28 NOTE — PROGRESS NOTES
Anticoagulation Summary  As of 2021    INR goal:  2.0-3.0   TTR:  12.0 % (1.9 wk)   INR used for dosin.50 (2021)   Warfarin maintenance plan:  9 mg (6 mg x 1.5) every Mon, Fri; 6 mg (6 mg x 1) all other days   Weekly warfarin total:  48 mg   Plan last modified:  Ciro Mohamud, PharmD (2021)   Next INR check:  2021   Target end date:  Indefinite    Indications    Paroxysmal atrial fibrillation (HCC) [I48.0]             Anticoagulation Episode Summary     INR check location:      Preferred lab:      Send INR reminders to:      Comments:                  Anticoagulation Patient Findings  Patient Findings     Positives:  Change in health, Change in medications    Negatives:  Signs/symptoms of thrombosis, Signs/symptoms of bleeding, Laboratory test error suspected, Change in alcohol use, Change in activity, Upcoming invasive procedure, Emergency department visit, Upcoming dental procedure, Missed doses, Extra doses, Change in diet/appetite, Hospital admission, Bruising, Other complaints    Comments:  Patient started 10 day course of keflex , possible cholecystitis           HPI:  Lorene Haro seen in clinic today, on anticoagulation therapy with warfarin for stroke prophylaxis  Changes to current medical/health status since last appt: Patient went to ER last night for abdominal pain. Patient was sent home with 10 day course of Keflex, possible cholecystitis.   Denies signs/symptoms of bleeding and/or thrombosis since the last appt.    Denies any interval changes to diet  Medication changes as noted above.   Denies any complications or cost restrictions with current therapy.   Verified current warfarin dosing schedule.   BP recorded in vitals. Denied   Medications reconciled yes   Pt Not on antiplatelet therapy    A/P   INR  5.50 supra-therapeutic.   Instructed patient to hold today's() and tomorrow's() dose and re-check INR .     Follow up appointment on 2021    Sumaya  Karo - Student Pharmacist   José Miguel Gallegos, PharmD

## 2021-04-30 ENCOUNTER — ANTICOAGULATION VISIT (OUTPATIENT)
Dept: VASCULAR LAB | Facility: MEDICAL CENTER | Age: 46
End: 2021-04-30
Attending: INTERNAL MEDICINE
Payer: MEDICAID

## 2021-04-30 DIAGNOSIS — I48.0 PAROXYSMAL ATRIAL FIBRILLATION (HCC): ICD-10-CM

## 2021-04-30 LAB
INR BLD: 2 (ref 0.9–1.2)
INR PPP: 2 (ref 2–3.5)

## 2021-04-30 PROCEDURE — 99284 EMERGENCY DEPT VISIT MOD MDM: CPT

## 2021-04-30 PROCEDURE — 99212 OFFICE O/P EST SF 10 MIN: CPT

## 2021-04-30 PROCEDURE — 85610 PROTHROMBIN TIME: CPT

## 2021-04-30 ASSESSMENT — FIBROSIS 4 INDEX: FIB4 SCORE: 0.45

## 2021-04-30 NOTE — PROGRESS NOTES
Anticoagulation Summary  As of 2021    INR goal:  2.0-3.0   TTR:  13.9 % (2.1 wk)   INR used for dosin.00 (2021)   Warfarin maintenance plan:  9 mg (6 mg x 1.5) every Mon, Fri; 6 mg (6 mg x 1) all other days   Weekly warfarin total:  48 mg   Plan last modified:  Hilda MackD (2021)   Next INR check:  2021   Target end date:  Indefinite    Indications    Paroxysmal atrial fibrillation (HCC) [I48.0]             Anticoagulation Episode Summary     INR check location:      Preferred lab:      Send INR reminders to:      Comments:          Anticoagulation Patient Findings      HPI:  Lorene Haro seen in clinic today for follow up on anticoagulation therapy in the presence of Afib.   Denies any changes to current medical/health status since last appointment.   Denies any medication or diet changes.   No current symptoms of bleeding or thrombosis reported.    Pt is not on antiplatelet therapy     A/P:   INR is therapeutic.   Patient to take 6 mg daily until next apt      Pt educated to contact our clinic with any changes in medications or s/s of bleeding or thrombosis. Pt is aware to seek immediate medical attention for falls, head injury or deep cuts    Follow up appointment in 5 days      Liudmila Mayes, Pharm.D, BC-ACP, BC-ADM

## 2021-05-01 ENCOUNTER — HOSPITAL ENCOUNTER (EMERGENCY)
Facility: MEDICAL CENTER | Age: 46
End: 2021-05-01
Attending: EMERGENCY MEDICINE
Payer: MEDICAID

## 2021-05-01 ENCOUNTER — APPOINTMENT (OUTPATIENT)
Dept: RADIOLOGY | Facility: MEDICAL CENTER | Age: 46
End: 2021-05-01
Attending: EMERGENCY MEDICINE
Payer: MEDICAID

## 2021-05-01 VITALS
SYSTOLIC BLOOD PRESSURE: 112 MMHG | OXYGEN SATURATION: 100 % | BODY MASS INDEX: 55.13 KG/M2 | HEIGHT: 61 IN | TEMPERATURE: 98 F | HEART RATE: 71 BPM | WEIGHT: 292 LBS | RESPIRATION RATE: 20 BRPM | DIASTOLIC BLOOD PRESSURE: 71 MMHG

## 2021-05-01 DIAGNOSIS — R10.11 RIGHT UPPER QUADRANT ABDOMINAL PAIN: ICD-10-CM

## 2021-05-01 LAB
ALBUMIN SERPL BCP-MCNC: 4.1 G/DL (ref 3.2–4.9)
ALBUMIN/GLOB SERPL: 1.3 G/DL
ALP SERPL-CCNC: 108 U/L (ref 30–99)
ALT SERPL-CCNC: 19 U/L (ref 2–50)
ANION GAP SERPL CALC-SCNC: 6 MMOL/L (ref 7–16)
APPEARANCE UR: CLEAR
AST SERPL-CCNC: 10 U/L (ref 12–45)
BACTERIA #/AREA URNS HPF: ABNORMAL /HPF
BASOPHILS # BLD AUTO: 0.2 % (ref 0–1.8)
BASOPHILS # BLD: 0.02 K/UL (ref 0–0.12)
BILIRUB SERPL-MCNC: 0.6 MG/DL (ref 0.1–1.5)
BILIRUB UR QL STRIP.AUTO: NEGATIVE
BUN SERPL-MCNC: 13 MG/DL (ref 8–22)
CALCIUM SERPL-MCNC: 9.4 MG/DL (ref 8.5–10.5)
CAOX CRY #/AREA URNS HPF: ABNORMAL /HPF
CHLORIDE SERPL-SCNC: 103 MMOL/L (ref 96–112)
CO2 SERPL-SCNC: 31 MMOL/L (ref 20–33)
COLOR UR: YELLOW
CREAT SERPL-MCNC: 1.3 MG/DL (ref 0.5–1.4)
EOSINOPHIL # BLD AUTO: 0.15 K/UL (ref 0–0.51)
EOSINOPHIL NFR BLD: 1.3 % (ref 0–6.9)
EPI CELLS #/AREA URNS HPF: ABNORMAL /HPF
ERYTHROCYTE [DISTWIDTH] IN BLOOD BY AUTOMATED COUNT: 56 FL (ref 35.9–50)
GLOBULIN SER CALC-MCNC: 3.2 G/DL (ref 1.9–3.5)
GLUCOSE SERPL-MCNC: 118 MG/DL (ref 65–99)
GLUCOSE UR STRIP.AUTO-MCNC: NEGATIVE MG/DL
HCG SERPL QL: NEGATIVE
HCT VFR BLD AUTO: 41.6 % (ref 37–47)
HGB BLD-MCNC: 12.5 G/DL (ref 12–16)
HYALINE CASTS #/AREA URNS LPF: ABNORMAL /LPF
IMM GRANULOCYTES # BLD AUTO: 0.06 K/UL (ref 0–0.11)
IMM GRANULOCYTES NFR BLD AUTO: 0.5 % (ref 0–0.9)
INR PPP: 1.6 (ref 0.87–1.13)
KETONES UR STRIP.AUTO-MCNC: ABNORMAL MG/DL
LEUKOCYTE ESTERASE UR QL STRIP.AUTO: NEGATIVE
LIPASE SERPL-CCNC: 21 U/L (ref 11–82)
LYMPHOCYTES # BLD AUTO: 1.82 K/UL (ref 1–4.8)
LYMPHOCYTES NFR BLD: 16 % (ref 22–41)
MCH RBC QN AUTO: 27.7 PG (ref 27–33)
MCHC RBC AUTO-ENTMCNC: 30 G/DL (ref 33.6–35)
MCV RBC AUTO: 92.2 FL (ref 81.4–97.8)
MICRO URNS: ABNORMAL
MONOCYTES # BLD AUTO: 0.69 K/UL (ref 0–0.85)
MONOCYTES NFR BLD AUTO: 6.1 % (ref 0–13.4)
NEUTROPHILS # BLD AUTO: 8.64 K/UL (ref 2–7.15)
NEUTROPHILS NFR BLD: 75.9 % (ref 44–72)
NITRITE UR QL STRIP.AUTO: NEGATIVE
NRBC # BLD AUTO: 0 K/UL
NRBC BLD-RTO: 0 /100 WBC
PH UR STRIP.AUTO: 6 [PH] (ref 5–8)
PLATELET # BLD AUTO: 278 K/UL (ref 164–446)
PMV BLD AUTO: 10.7 FL (ref 9–12.9)
POTASSIUM SERPL-SCNC: 3.8 MMOL/L (ref 3.6–5.5)
PROT SERPL-MCNC: 7.3 G/DL (ref 6–8.2)
PROT UR QL STRIP: NEGATIVE MG/DL
PROTHROMBIN TIME: 19.6 SEC (ref 12–14.6)
RBC # BLD AUTO: 4.51 M/UL (ref 4.2–5.4)
RBC # URNS HPF: ABNORMAL /HPF
RBC UR QL AUTO: ABNORMAL
SODIUM SERPL-SCNC: 140 MMOL/L (ref 135–145)
SP GR UR STRIP.AUTO: 1.02
UROBILINOGEN UR STRIP.AUTO-MCNC: 0.2 MG/DL
WBC # BLD AUTO: 11.4 K/UL (ref 4.8–10.8)
WBC #/AREA URNS HPF: ABNORMAL /HPF

## 2021-05-01 PROCEDURE — 81001 URINALYSIS AUTO W/SCOPE: CPT

## 2021-05-01 PROCEDURE — 84703 CHORIONIC GONADOTROPIN ASSAY: CPT

## 2021-05-01 PROCEDURE — 83690 ASSAY OF LIPASE: CPT

## 2021-05-01 PROCEDURE — 74176 CT ABD & PELVIS W/O CONTRAST: CPT

## 2021-05-01 PROCEDURE — 85610 PROTHROMBIN TIME: CPT

## 2021-05-01 PROCEDURE — 85025 COMPLETE CBC W/AUTO DIFF WBC: CPT

## 2021-05-01 PROCEDURE — 80053 COMPREHEN METABOLIC PANEL: CPT

## 2021-05-01 PROCEDURE — 36415 COLL VENOUS BLD VENIPUNCTURE: CPT

## 2021-05-01 NOTE — ED TRIAGE NOTES
Chief Complaint   Patient presents with   • Abdominal Pain     BIB [facility], ambulating with a steady gait c/o abd pressure and small amount bright red blood mixed with formed stool x1 day    Pt & staff masked and in appropriate PPE during encounter.  Pt denies fever/travel or being in contact with anyone testing positive for Covid.  Explained pt the triage process, made pt aware to tell the RN/staff of any changes/concerns, pt verbalized understanding of process and instructions given.  Pt to ER lobby.

## 2021-05-01 NOTE — ED PROVIDER NOTES
ED Provider Note    CHIEF COMPLAINT  Chief Complaint   Patient presents with   • Abdominal Pain       HPI  Lorene Haro is a 45 y.o. female who presents to the emergency department with abdominal pain.  Pain is located in the right upper quadrant.  Radiates to the right flank and down into the right side of the abdomen.  Pain started 2 days ago.  Waxing and waning.  No modifying factors.  Has not had a fever or chills.  Denies dysuria but thinks that she is either having blood in her urine blood from her vagina or blood in her stool.  She cannot say where the blood has been coming from.  She had 2 episodes of this.  No other easy bruising or bleeding although is anticoagulated for atrial fibrillation.  She has not had a fever or chills.  Positive nausea.  No vomiting.  No rash.  Denies chest pain, shortness of breath or pleuritic symptoms.    REVIEW OF SYSTEMS  As per HPI, otherwise a 10 point review of systems is negative    PAST MEDICAL HISTORY  Past Medical History:   Diagnosis Date   • Asthma    • Bipolar 2 disorder (HCC)    • Chickenpox    • Chronic obstructive pulmonary disease (HCC)    • Hypertension    • Other psychological stress    • Schizophrenic disorder (HCC)    • Yeast infection of the skin 2021       SOCIAL HISTORY  Social History     Tobacco Use   • Smoking status: Former Smoker     Packs/day: 1.00     Years: 20.00     Pack years: 20.00     Types: Cigarettes     Quit date: 2020     Years since quittin.3   • Smokeless tobacco: Never Used   • Tobacco comment: Patient states she quit two days ago    Substance Use Topics   • Alcohol use: No   • Drug use: No       SURGICAL HISTORY  Past Surgical History:   Procedure Laterality Date   • CHOLECYSTECTOMY     • COLECTOMY     • HYSTERECTOMY LAPAROSCOPY     • KNEE ARTHROSCOPY Left        CURRENT MEDICATIONS  Home Medications    **Home medications have not yet been reviewed for this encounter**         ALLERGIES  Allergies   Allergen Reactions  "  • Amoxicillin Rash and Itching     Tolerates cephalosporins, pt reports that she gets a rash all over her body and gets itchy   • Penicillin G Rash and Itching     pt reports that she gets a rash all over her body and gets itchy       PHYSICAL EXAM  VITAL SIGNS: /50   Pulse 74   Temp 36.8 °C (98.3 °F) (Temporal)   Resp 18   Ht 1.549 m (5' 1\")   Wt (!) 132 kg (292 lb)   LMP  (LMP Unknown)   SpO2 98%   BMI 55.17 kg/m²    Constitutional: Awake and alert  HENT: Normal inspection  Eyes: Normal inspection  Neck: Grossly normal range of motion.  Cardiovascular: Normal heart rate, Normal rhythm.  Symmetric peripheral pulses.   Thorax & Lungs: No respiratory distress, No wheezing, No rales, No rhonchi, No chest tenderness.   Abdomen: Bowel sounds normal, soft, non-distended, mild tenderness to palpation of the right-sided abdomen.  No rebound or peritonitis  Genitalia: Normal female genitalia.  No vaginal bleeding noted  Rectal: Brown Hemoccult-negative stool  Skin: No obvious rash.  Back: No tenderness, No CVA tenderness.   Extremities: No clubbing, cyanosis, edema, no Homans or cords.  Neurologic: Grossly normal   Psychiatric: Normal for situation    RADIOLOGY/PROCEDURES  CT-RENAL COLIC EVALUATION(A/P W/O)   Final Result         1.  No acute abnormality   2.  Splenomegaly, low density subcentimeter lesion in the spleen could represent small cyst or hemangioma, otherwise too small to characterize.   3.  Hepatomegaly           Imaging is interpreted by radiologist    Labs:  Results for orders placed or performed during the hospital encounter of 05/01/21   CBC WITH DIFFERENTIAL   Result Value Ref Range    WBC 11.4 (H) 4.8 - 10.8 K/uL    RBC 4.51 4.20 - 5.40 M/uL    Hemoglobin 12.5 12.0 - 16.0 g/dL    Hematocrit 41.6 37.0 - 47.0 %    MCV 92.2 81.4 - 97.8 fL    MCH 27.7 27.0 - 33.0 pg    MCHC 30.0 (L) 33.6 - 35.0 g/dL    RDW 56.0 (H) 35.9 - 50.0 fL    Platelet Count 278 164 - 446 K/uL    MPV 10.7 9.0 - 12.9 fL "    Neutrophils-Polys 75.90 (H) 44.00 - 72.00 %    Lymphocytes 16.00 (L) 22.00 - 41.00 %    Monocytes 6.10 0.00 - 13.40 %    Eosinophils 1.30 0.00 - 6.90 %    Basophils 0.20 0.00 - 1.80 %    Immature Granulocytes 0.50 0.00 - 0.90 %    Nucleated RBC 0.00 /100 WBC    Neutrophils (Absolute) 8.64 (H) 2.00 - 7.15 K/uL    Lymphs (Absolute) 1.82 1.00 - 4.80 K/uL    Monos (Absolute) 0.69 0.00 - 0.85 K/uL    Eos (Absolute) 0.15 0.00 - 0.51 K/uL    Baso (Absolute) 0.02 0.00 - 0.12 K/uL    Immature Granulocytes (abs) 0.06 0.00 - 0.11 K/uL    NRBC (Absolute) 0.00 K/uL   COMP METABOLIC PANEL   Result Value Ref Range    Sodium 140 135 - 145 mmol/L    Potassium 3.8 3.6 - 5.5 mmol/L    Chloride 103 96 - 112 mmol/L    Co2 31 20 - 33 mmol/L    Anion Gap 6.0 (L) 7.0 - 16.0    Glucose 118 (H) 65 - 99 mg/dL    Bun 13 8 - 22 mg/dL    Creatinine 1.30 0.50 - 1.40 mg/dL    Calcium 9.4 8.5 - 10.5 mg/dL    AST(SGOT) 10 (L) 12 - 45 U/L    ALT(SGPT) 19 2 - 50 U/L    Alkaline Phosphatase 108 (H) 30 - 99 U/L    Total Bilirubin 0.6 0.1 - 1.5 mg/dL    Albumin 4.1 3.2 - 4.9 g/dL    Total Protein 7.3 6.0 - 8.2 g/dL    Globulin 3.2 1.9 - 3.5 g/dL    A-G Ratio 1.3 g/dL   LIPASE   Result Value Ref Range    Lipase 21 11 - 82 U/L   HCG QUAL SERUM   Result Value Ref Range    Beta-Hcg Qualitative Serum Negative Negative   URINALYSIS,CULTURE IF INDICATED    Specimen: Blood   Result Value Ref Range    Color Yellow     Character Clear     Specific Gravity 1.019 <1.035    Ph 6.0 5.0 - 8.0    Glucose Negative Negative mg/dL    Ketones Trace (A) Negative mg/dL    Protein Negative Negative mg/dL    Bilirubin Negative Negative    Urobilinogen, Urine 0.2 Negative    Nitrite Negative Negative    Leukocyte Esterase Negative Negative    Occult Blood Small (A) Negative    Micro Urine Req Microscopic    URINE MICROSCOPIC (W/UA)   Result Value Ref Range    WBC 5-10 (A) /hpf    RBC 0-2 /hpf    Bacteria Rare (A) None /hpf    Epithelial Cells Few /hpf    Ca Oxalate Crystal  Few /hpf    Hyaline Cast 0-2 /lpf   ESTIMATED GFR   Result Value Ref Range    GFR If  53 (A) >60 mL/min/1.73 m 2    GFR If Non  44 (A) >60 mL/min/1.73 m 2   PT/INR   Result Value Ref Range    PT 19.6 (H) 12.0 - 14.6 sec    INR 1.60 (H) 0.87 - 1.13           COURSE & MEDICAL DECISION MAKING  Presents with right-sided abdominal pain.  Reported having an episode of some blood either in stool urine or from her vagina.  There is no evidence of any bleeding on her examination.  I reviewed her chart.  Here recently with abdominal pain with reassuring evaluation.  Work-up is initiated.    CBC with slight leukocytosis improved from previous.  CMP unremarkable.  UA with small occult blood otherwise negative.  Obtain CT scan of the abdomen and pelvis and this was negative for any acute findings.    At this point the cause of her abdominal pain is not clear.  It does not appear that she is having any internal bleeding.  I advised her to take a bland diet.  Plenty of fluids.  Over-the-counter PPI.  Return to the ER for bleeding, fevers, worsening, not improving or concern.    FINAL IMPRESSION  1.  Right-sided abdominal pain      This dictation was created using voice recognition software. The accuracy of the dictation is limited to the abilities of the software.  The nursing notes were reviewed and certain aspects of this information were incorporated into this note.      Electronically signed by: David Alejandra M.D., 5/1/2021 4:37 AM

## 2021-05-01 NOTE — ED NOTES
Patient discharged. Patient provided information about abdominal pain. Patient educated to follow-up w/ PCP and to return to the ER w/ any worsening symptoms. Pt awaiting for [facility] in the room.

## 2021-05-01 NOTE — ED NOTES
Agree w/ triage note. Patient wheeled back to room and placed in gown. Patient educated on ER process.

## 2021-05-01 NOTE — DISCHARGE PLANNING
MSW spoke to bedside RN. Pt has been waiting several hours for an [facility] ride. Bedside RN has tried calling [facility] but pt is still waiting on a ride.  Pt also cannot get in touch with any family to come pick her up. Pt is on 2L of oxygen and uses a walker to ambulate.     MSW spoke to [facility]. [facility] provided quote for transport for $51.03. MSW arranged transport with [facility] for 11-1130am. MSW updated bedside RN.

## 2021-05-01 NOTE — ED NOTES
"Call placed to [facility] with regard to pt ride. [facility] states pt is still waiting to \"be assigned\" for transport. Call placed to social work for additional assistance.   "

## 2021-05-01 NOTE — ED NOTES
This RN called [facility]. Per [facility] cab may take up until 0830 or sooner but will call the nurse's station when cab is available.

## 2021-05-02 ENCOUNTER — APPOINTMENT (OUTPATIENT)
Dept: RADIOLOGY | Facility: MEDICAL CENTER | Age: 46
End: 2021-05-02
Attending: EMERGENCY MEDICINE
Payer: MEDICAID

## 2021-05-02 ENCOUNTER — HOSPITAL ENCOUNTER (EMERGENCY)
Facility: MEDICAL CENTER | Age: 46
End: 2021-05-02
Attending: EMERGENCY MEDICINE
Payer: MEDICAID

## 2021-05-02 VITALS
TEMPERATURE: 98.3 F | RESPIRATION RATE: 16 BRPM | OXYGEN SATURATION: 98 % | HEIGHT: 61 IN | BODY MASS INDEX: 53.61 KG/M2 | DIASTOLIC BLOOD PRESSURE: 53 MMHG | SYSTOLIC BLOOD PRESSURE: 103 MMHG | WEIGHT: 283.95 LBS | HEART RATE: 69 BPM

## 2021-05-02 DIAGNOSIS — R04.0 EPISTAXIS: ICD-10-CM

## 2021-05-02 DIAGNOSIS — L03.115 CELLULITIS OF RIGHT LOWER EXTREMITY: ICD-10-CM

## 2021-05-02 LAB
ALBUMIN SERPL BCP-MCNC: 3.8 G/DL (ref 3.2–4.9)
ALBUMIN/GLOB SERPL: 1.2 G/DL
ALP SERPL-CCNC: 102 U/L (ref 30–99)
ALT SERPL-CCNC: 18 U/L (ref 2–50)
ANION GAP SERPL CALC-SCNC: 5 MMOL/L (ref 7–16)
AST SERPL-CCNC: 14 U/L (ref 12–45)
BASOPHILS # BLD AUTO: 0.2 % (ref 0–1.8)
BASOPHILS # BLD: 0.03 K/UL (ref 0–0.12)
BILIRUB SERPL-MCNC: 0.5 MG/DL (ref 0.1–1.5)
BLOOD CULTURE HOLD CXBCH: NORMAL
BUN SERPL-MCNC: 9 MG/DL (ref 8–22)
CALCIUM SERPL-MCNC: 9.6 MG/DL (ref 8.5–10.5)
CHLORIDE SERPL-SCNC: 103 MMOL/L (ref 96–112)
CO2 SERPL-SCNC: 30 MMOL/L (ref 20–33)
CREAT SERPL-MCNC: 0.94 MG/DL (ref 0.5–1.4)
EOSINOPHIL # BLD AUTO: 0.18 K/UL (ref 0–0.51)
EOSINOPHIL NFR BLD: 1.4 % (ref 0–6.9)
ERYTHROCYTE [DISTWIDTH] IN BLOOD BY AUTOMATED COUNT: 54.9 FL (ref 35.9–50)
GLOBULIN SER CALC-MCNC: 3.2 G/DL (ref 1.9–3.5)
GLUCOSE SERPL-MCNC: 94 MG/DL (ref 65–99)
HCT VFR BLD AUTO: 41 % (ref 37–47)
HGB BLD-MCNC: 12.4 G/DL (ref 12–16)
IMM GRANULOCYTES # BLD AUTO: 0.09 K/UL (ref 0–0.11)
IMM GRANULOCYTES NFR BLD AUTO: 0.7 % (ref 0–0.9)
INR PPP: 1.67 (ref 0.87–1.13)
LYMPHOCYTES # BLD AUTO: 1.83 K/UL (ref 1–4.8)
LYMPHOCYTES NFR BLD: 13.8 % (ref 22–41)
MCH RBC QN AUTO: 27.6 PG (ref 27–33)
MCHC RBC AUTO-ENTMCNC: 30.2 G/DL (ref 33.6–35)
MCV RBC AUTO: 91.1 FL (ref 81.4–97.8)
MONOCYTES # BLD AUTO: 0.67 K/UL (ref 0–0.85)
MONOCYTES NFR BLD AUTO: 5 % (ref 0–13.4)
NEUTROPHILS # BLD AUTO: 10.47 K/UL (ref 2–7.15)
NEUTROPHILS NFR BLD: 78.9 % (ref 44–72)
NRBC # BLD AUTO: 0 K/UL
NRBC BLD-RTO: 0 /100 WBC
PLATELET # BLD AUTO: 288 K/UL (ref 164–446)
PMV BLD AUTO: 10.8 FL (ref 9–12.9)
POTASSIUM SERPL-SCNC: 4.4 MMOL/L (ref 3.6–5.5)
PROT SERPL-MCNC: 7 G/DL (ref 6–8.2)
PROTHROMBIN TIME: 20.3 SEC (ref 12–14.6)
RBC # BLD AUTO: 4.5 M/UL (ref 4.2–5.4)
SODIUM SERPL-SCNC: 138 MMOL/L (ref 135–145)
WBC # BLD AUTO: 13.3 K/UL (ref 4.8–10.8)

## 2021-05-02 PROCEDURE — 85025 COMPLETE CBC W/AUTO DIFF WBC: CPT

## 2021-05-02 PROCEDURE — 99283 EMERGENCY DEPT VISIT LOW MDM: CPT

## 2021-05-02 PROCEDURE — 85610 PROTHROMBIN TIME: CPT

## 2021-05-02 PROCEDURE — 76856 US EXAM PELVIC COMPLETE: CPT

## 2021-05-02 PROCEDURE — 80053 COMPREHEN METABOLIC PANEL: CPT

## 2021-05-02 PROCEDURE — 36415 COLL VENOUS BLD VENIPUNCTURE: CPT

## 2021-05-02 RX ORDER — CEPHALEXIN 500 MG/1
500 CAPSULE ORAL 4 TIMES DAILY
Qty: 28 CAPSULE | Refills: 0 | Status: ON HOLD | OUTPATIENT
Start: 2021-05-02 | End: 2021-05-18

## 2021-05-02 ASSESSMENT — FIBROSIS 4 INDEX: FIB4 SCORE: 0.39

## 2021-05-02 ASSESSMENT — LIFESTYLE VARIABLES
DOES PATIENT WANT TO STOP DRINKING: NO
DO YOU DRINK ALCOHOL: NO

## 2021-05-02 NOTE — ED PROVIDER NOTES
ED Provider Note    Scribed for [place] Looney M.D. by Sveta Grady. 5/2/2021, 1:04 PM.    Primary care provider: SHANKAR Gleason  Means of arrival: EMS  History obtained from: Patient  History limited by: None    CHIEF COMPLAINT  Chief Complaint   Patient presents with   • Epistaxis     reported bleeding at home today for unknown period of time - bleeding controlled in triage.   • Rectal Bleeding     spot of blood noted when wiping today after urination.       HPI  Lorene Haro is a 45 y.o. female with a history of hypertension who presents to the Emergency Department via ambulance for rectal bleeding onset today. The patient states she was seen in the [facility] ED 1 day ago for rectal bleeding and received a rectal exam during her visit which showed no abnormalities. She reports while she was urinating today she wiped and saw blood on the toilet paper. Patient says whenever she has a bowel movement or urinates she sees blood after wiping and states she has been having frequent episodes of epistaxis. She denies experiencing any trauma or injuries that may have caused her symptoms.  States the bleeding is only present when she wipes and she has not required a pad or have blood on her underwear.  Patient adds she is currently on supplemental oxygen at home. After the patient's symptoms failed to resolve she was prompted to call EMS to bring her into the [facility] ED this afternoon for further evaluation. No alleviating or exacerbating factors were noted. Patient has associated epistaxis and dizziness, but denies dysuria, vaginal discharge, gingival bleeding, easy bruising or lightheadedness. She does not smoke, drink alcohol, or use drugs. Patient is allergic to Amoxicillin and takes Warfarin. She adds she received her first dose of the COVID-19 vaccine a few weeks ago and is scheduled to receive her second dose this month. Her PCP is Dr. Dinh    Patient's past medical records were reviewed which show the  patient was seen in the [facility] ED on 21 and 21 for rectal bleeding. Patient had multiple labs ordered which showed no abnormalities and had a rectal exam performed that was hemoccult negative.     REVIEW OF SYSTEMS  Pertinent positives include rectal bleeding, epistaxis, and dizziness. Pertinent negatives include no dysuria, vaginal discharge, gingival bleeding, or lightheadedness. All other systems reviewed and negative.     PAST MEDICAL HISTORY   has a past medical history of Asthma, Bipolar 2 disorder (HCC), Chickenpox, Chronic obstructive pulmonary disease (HCC), Hypertension, Other psychological stress, Schizophrenic disorder (HCC), and Yeast infection of the skin (2021).    SURGICAL HISTORY   has a past surgical history that includes hysterectomy laparoscopy; colectomy; cholecystectomy; and knee arthroscopy (Left).    SOCIAL HISTORY  Social History     Tobacco Use   • Smoking status: Former Smoker     Packs/day: 1.00     Years: 20.00     Pack years: 20.00     Types: Cigarettes     Quit date: 2020     Years since quittin.3   • Smokeless tobacco: Never Used   • Tobacco comment: Patient states she quit two days ago    Substance Use Topics   • Alcohol use: No   • Drug use: No      Social History     Substance and Sexual Activity   Drug Use No       FAMILY HISTORY  Family History   Problem Relation Age of Onset   • No Known Problems Mother    • Cancer Sister        CURRENT MEDICATIONS  Home Medications     Reviewed by Carla Gonzales R.N. (Registered Nurse) on 21 at 1242  Med List Status: Partial   Medication Last Dose Status   acetaminophen (TYLENOL) 500 MG Tab  Active   albuterol 108 (90 Base) MCG/ACT Aero Soln inhalation aerosol  Active   aripiprazole (ABILIFY) 30 MG tablet  Active   atorvastatin (LIPITOR) 20 MG Tab  Active   cephALEXin (KEFLEX) 500 MG Cap  Active   fluticasone (FLONASE) 50 MCG/ACT nasal spray  Active   furosemide (LASIX) 20 MG Tab  Active   lithium CR (LITHOBID) 300  "MG Tab CR  Active   metoprolol SR (TOPROL XL) 25 MG TABLET SR 24 HR  Active   montelukast (SINGULAIR) 10 MG Tab  Active   nystatin (MYCOSTATIN) powder  Active   potassium chloride SA (KDUR) 20 MEQ Tab CR  Active   tiotropium (SPIRIVA HANDIHALER) 18 MCG Cap  Active   warfarin (COUMADIN) 6 MG Tab  Active                ALLERGIES  Allergies   Allergen Reactions   • Amoxicillin Rash and Itching     Tolerates cephalosporins, pt reports that she gets a rash all over her body and gets itchy   • Penicillin G Rash and Itching     pt reports that she gets a rash all over her body and gets itchy       PHYSICAL EXAM  VITAL SIGNS: /69   Pulse 80   Temp 36.8 °C (98.3 °F) (Temporal)   Resp 16   Ht 1.549 m (5' 1\")   Wt (!) 129 kg (283 lb 15.2 oz)   LMP  (LMP Unknown)   SpO2 96%   Breastfeeding No   BMI 53.65 kg/m²     Constitutional: Chronically ill appearing, No acute distress, Non-toxic appearance.   HENT: Normocephalic, Atraumatic, Bilateral external ears normal, Right nares with some mild erythema no active bleeding, No sores that require cautery.   Eyes: PERRL, EOMI, Conjunctiva normal.    Neck: Normal range of motion, No tenderness, Supple.     Cardiovascular: Normal heart rate, Normal rhythm.    Thorax & Lungs: Normal breath sounds, No respiratory distress.    Abdomen: Minimal suprapubic tenderness, no distention, no guarding, no rebound.    Skin: Warm, Dry, No erythema, No rash. No contusions, On bilateral lower extremities there is erythema on the anterior aspect of the tibia and fibula bilaterally, No draining sores or abscess  Back: No tenderness, No CVA tenderness.   Rectal Brown stool, Heme negative   Extremities: Intact distal pulses, No edema, No tenderness   Neurologic: Alert & oriented x 3, Normal motor function, Normal sensory function, No focal deficits noted.  Psychiatric: Appropriate                                                     DIAGNOSTIC STUDIES / PROCEDURES\    LABS  Results for orders " placed or performed during the hospital encounter of 05/02/21   PT/INR   Result Value Ref Range    PT 20.3 (H) 12.0 - 14.6 sec    INR 1.67 (H) 0.87 - 1.13   CBC WITH DIFFERENTIAL   Result Value Ref Range    WBC 13.3 (H) 4.8 - 10.8 K/uL    RBC 4.50 4.20 - 5.40 M/uL    Hemoglobin 12.4 12.0 - 16.0 g/dL    Hematocrit 41.0 37.0 - 47.0 %    MCV 91.1 81.4 - 97.8 fL    MCH 27.6 27.0 - 33.0 pg    MCHC 30.2 (L) 33.6 - 35.0 g/dL    RDW 54.9 (H) 35.9 - 50.0 fL    Platelet Count 288 164 - 446 K/uL    MPV 10.8 9.0 - 12.9 fL    Neutrophils-Polys 78.90 (H) 44.00 - 72.00 %    Lymphocytes 13.80 (L) 22.00 - 41.00 %    Monocytes 5.00 0.00 - 13.40 %    Eosinophils 1.40 0.00 - 6.90 %    Basophils 0.20 0.00 - 1.80 %    Immature Granulocytes 0.70 0.00 - 0.90 %    Nucleated RBC 0.00 /100 WBC    Neutrophils (Absolute) 10.47 (H) 2.00 - 7.15 K/uL    Lymphs (Absolute) 1.83 1.00 - 4.80 K/uL    Monos (Absolute) 0.67 0.00 - 0.85 K/uL    Eos (Absolute) 0.18 0.00 - 0.51 K/uL    Baso (Absolute) 0.03 0.00 - 0.12 K/uL    Immature Granulocytes (abs) 0.09 0.00 - 0.11 K/uL    NRBC (Absolute) 0.00 K/uL   COMP METABOLIC PANEL   Result Value Ref Range    Sodium 138 135 - 145 mmol/L    Potassium 4.4 3.6 - 5.5 mmol/L    Chloride 103 96 - 112 mmol/L    Co2 30 20 - 33 mmol/L    Anion Gap 5.0 (L) 7.0 - 16.0    Glucose 94 65 - 99 mg/dL    Bun 9 8 - 22 mg/dL    Creatinine 0.94 0.50 - 1.40 mg/dL    Calcium 9.6 8.5 - 10.5 mg/dL    AST(SGOT) 14 12 - 45 U/L    ALT(SGPT) 18 2 - 50 U/L    Alkaline Phosphatase 102 (H) 30 - 99 U/L    Total Bilirubin 0.5 0.1 - 1.5 mg/dL    Albumin 3.8 3.2 - 4.9 g/dL    Total Protein 7.0 6.0 - 8.2 g/dL    Globulin 3.2 1.9 - 3.5 g/dL    A-G Ratio 1.2 g/dL   ESTIMATED GFR   Result Value Ref Range    GFR If African American >60 >60 mL/min/1.73 m 2    GFR If Non African American >60 >60 mL/min/1.73 m 2      All labs reviewed by me.    RADIOLOGY  US-PELVIC COMPLETE (TRANSABDOMINAL/TRANSVAGINAL) (COMBO)   Final Result      1.  Prior hysterectomy       2.  Nonvisualization of the ovaries      3.  No free fluid or mass        The radiologist's interpretation of all radiological studies have been reviewed by me.    COURSE & MEDICAL DECISION MAKING  Nursing notes, VS, PMSFHx reviewed in chart.     Patient presents to the emergency department complaining of blood when she wipes as well as a nosebleed.  Concerning the nosebleed there is no active bleeding.  She is currently on supplemental oxygen which I think is drying out her nose.  I have discussed care methods with the patient concerning her nosebleeds.  I will also give her a nasal clamp.  Concerning the bleeding when she wipes she has stable vital signs.  She was seen here yesterday for the same complaint.  Rectal exam today again reveals brown stool that is heme-negative.  I do not see any hemorrhoids.  She did have a urinalysis done on her last visit that showed a small amount of blood but 0-2 WBCs.  She does not think it is related to urination.  I asked to get another urine specimen but she states she does not need to urinate and is pretty certain its not from her urine.  I also cannot exclude a possible vaginal etiology for the patient's bleeding.  I will order an ultrasound to make sure there is no thickened endometrium or cervical mass.  We will also recheck hemoglobin to make sure is not a dramatic change.  Patient is on anticoagulation will recheck her INR.  Of note patient also has evidence of warmth and erythema to bilateral lower extremities.  Looks like she has an early cellulitis.  I do think this requires antibiotics.    1:04 PM Patient seen and examined at bedside. Discussed plan of care with patient. She was informed her bleeding is not rectal and told an ultrasound will be ordered to evaluate for possible vaginal source of bleeding. There is no current intervention for epistaxis due to it likely being related to a dry nose from using supplemental oxygen. Patient was encouraged to lubricate  her nares to prevent more episodes of epistaxis. She is understanding and agreeable to the plan of care. The patient presents with rectal bleeding and the differential diagnosis includes but is not limited to Anemia, Elevated INR . Ordered for US-Pelvic Complete, PT/INR, CBC w/ Differential, and CMP to evaluate.    1:42 PM Ordered Estimated GFR to evaluate the patient.   Patient has a white count of 13.3 which is slightly elevated from yesterday.  Patient hemoglobin is stable.  She has normal platelets.  CMP is essentially unremarkable.  Pelvic ultrasound shows that the patient's had a hysterectomy and there is no cervical masses noted.  I have advised the patient that at this point it is unclear what is causing her bleeding when she wipes.  She does have an appoint with her doctor on Tuesday and encouraged her to follow-up.  I will cover her with Keflex for her cellulitis to her legs.  Return precautions were given.    2:43 PM Patient was reevaluated at bedside. Her bilateral lower extremities are erythematous and swollen. Discussed lab and radiology results with the patient and informed them that there were no abnormalities as noted above. The patient was told there were no abnormalities in her cervix or rectum, and was informed her bleeding is most likely due to being on blood thinners. The plan of care was discussed with the patient. She was informed she will receive Keflex to treat her symptoms. The patient is understanding and agreeable to the plan of care. Patient had the opportunity to ask any questions. The plan for discharge was discussed with the patient and she was told to to return for any new or worsening symptoms and to follow up with her PCP. Patient is understanding and agreeable to the plan for discharge.     The patient will return for new or worsening symptoms and is stable at the time of discharge.    DISPOSITION:  Patient will be discharged home in stable condition.    FOLLOW UP:  Sheridan Dinh  A.P.N.  [address] [facility] [address]  [phone]    Schedule an appointment as soon as possible for a visit   If symptoms worsen, return to the er.      OUTPATIENT MEDICATIONS:  New Prescriptions    CEPHALEXIN (KEFLEX) 500 MG CAP    Take 1 capsule by mouth 4 times a day for 7 days.         FINAL IMPRESSION  1. Epistaxis    2. Cellulitis of right lower extremity          ISveta (Ade), am scribing for, and in the presence of, [place] Looney M.D..    Electronically signed by: Sveta Grady (Scribe), 5/2/2021    IAl[place]ia M.D. personally performed the services described in this documentation, as scribed by Sveta Grady in my presence, and it is both accurate and complete. C    The note accurately reflects work and decisions made by me.  [place] Looney M.D.  5/2/2021  3:18 PM

## 2021-05-02 NOTE — ED TRIAGE NOTES
Chief Complaint   Patient presents with   • Epistaxis     reported bleeding at home today for unknown period of time - bleeding controlled in triage.   • Rectal Bleeding     spot of blood noted when wiping today after urination.     Patient bib EMS from home, to triage via ambulaiton, with personal FWW, patient A&O x4.  Appropriate precautions in place.     Patient reports taking warfarin as prescribed.     Explained wait time and triage process to pt. Pt placed back in lobby, told to notify ED tech or triage RN of any changes, verbalized understanding.

## 2021-05-05 ENCOUNTER — TELEPHONE (OUTPATIENT)
Dept: CARDIOLOGY | Facility: MEDICAL CENTER | Age: 46
End: 2021-05-05

## 2021-05-05 ENCOUNTER — ANTICOAGULATION VISIT (OUTPATIENT)
Dept: VASCULAR LAB | Facility: MEDICAL CENTER | Age: 46
End: 2021-05-05
Attending: INTERNAL MEDICINE
Payer: MEDICAID

## 2021-05-05 ENCOUNTER — HOSPITAL ENCOUNTER (EMERGENCY)
Facility: MEDICAL CENTER | Age: 46
End: 2021-05-06
Attending: EMERGENCY MEDICINE
Payer: MEDICAID

## 2021-05-05 DIAGNOSIS — R07.9 CHEST PAIN, UNSPECIFIED TYPE: ICD-10-CM

## 2021-05-05 DIAGNOSIS — I48.0 PAROXYSMAL ATRIAL FIBRILLATION (HCC): ICD-10-CM

## 2021-05-05 LAB — INR PPP: 2.5 (ref 2–3.5)

## 2021-05-05 PROCEDURE — 85610 PROTHROMBIN TIME: CPT

## 2021-05-05 PROCEDURE — 99212 OFFICE O/P EST SF 10 MIN: CPT | Performed by: NURSE PRACTITIONER

## 2021-05-05 PROCEDURE — 99284 EMERGENCY DEPT VISIT MOD MDM: CPT

## 2021-05-05 ASSESSMENT — FIBROSIS 4 INDEX: FIB4 SCORE: 0.54

## 2021-05-05 NOTE — TELEPHONE ENCOUNTER
Called back and spoke to Estrella with [facility].  I asked her if she needs the paper order or the actual monitor and she stated patient received it and her caregiver threw everything out, so she needs another monitor.  I advised I am not positive but she will probably have to pay for another one as that was registered to her.  Advised I will contact our [employer] tech and get back to her. Verbalized understanding and was appreciative of call.

## 2021-05-05 NOTE — TELEPHONE ENCOUNTER
Spoke to Sharri MASSEY For zio patch and she stated the patient needs to call [employer] and they may send another one.  But she needs to call them.     Called Estrella back, left message that the patient needs to call the company, gave her the phone number [phone].  Advised to callback with further questions.

## 2021-05-05 NOTE — TELEPHONE ENCOUNTER
ADAN De La [facility] Care Coordinater for Dr Dinh, patient PCP, called and wanted to let you know that patient needs a new order for a Zio patch as the patient accidentally threw the one she received away. Please call Estrella at [phone].    Thank you,    Priscilla EPSTEIN

## 2021-05-05 NOTE — PROGRESS NOTES
Anticoagulation Summary  As of 2021    INR goal:  2.0-3.0   TTR:  17.7 % (2.9 wk)   INR used for dosin.50 (2021)   Warfarin maintenance plan:  6 mg (6 mg x 1) every day   Weekly warfarin total:  42 mg   Plan last modified:  Liudmila Mayes, PharmD (2021)   Next INR check:  5/10/2021   Target end date:  Indefinite    Indications    Paroxysmal atrial fibrillation (HCC) [I48.0]             Anticoagulation Episode Summary     INR check location:      Preferred lab:      Send INR reminders to:      Comments:          Anticoagulation Patient Findings      HPI:  Lorene Haro seen in clinic today for follow up on anticoagulation therapy in the presence of AF.   She was in the ER  with epistaxis and rectal bleeding. Work up neg. INR subtherapeutic at that time.  On cephalexin through .  Denies any diet changes.   No current symptoms of bleeding or thrombosis reported.    Pt is not on antiplatelet therapy.    A/P:   INR therapeutic. INR trended up from 1.6 on . Will decrease one dose tomorrow then continue current regimen.       Pt educated to contact our clinic with any changes in medications or s/s of bleeding or thrombosis. Pt is aware to seek immediate medical attention for falls, head injury or deep cuts    Follow up appointment on Monday.    Next Appointment: Monday, May 10, 2021 at 2:00 pm.    Sharri SHRESTHA

## 2021-05-06 ENCOUNTER — APPOINTMENT (OUTPATIENT)
Dept: RADIOLOGY | Facility: MEDICAL CENTER | Age: 46
End: 2021-05-06
Attending: EMERGENCY MEDICINE
Payer: MEDICAID

## 2021-05-06 VITALS
HEART RATE: 82 BPM | SYSTOLIC BLOOD PRESSURE: 109 MMHG | RESPIRATION RATE: 19 BRPM | BODY MASS INDEX: 54.8 KG/M2 | DIASTOLIC BLOOD PRESSURE: 80 MMHG | TEMPERATURE: 98.4 F | WEIGHT: 290 LBS | OXYGEN SATURATION: 98 %

## 2021-05-06 LAB
ALBUMIN SERPL BCP-MCNC: 3.8 G/DL (ref 3.2–4.9)
ALBUMIN/GLOB SERPL: 1.1 G/DL
ALP SERPL-CCNC: 116 U/L (ref 30–99)
ALT SERPL-CCNC: 19 U/L (ref 2–50)
ANION GAP SERPL CALC-SCNC: 7 MMOL/L (ref 7–16)
ANISOCYTOSIS BLD QL SMEAR: ABNORMAL
AST SERPL-CCNC: 17 U/L (ref 12–45)
BASOPHILS # BLD AUTO: 0.2 % (ref 0–1.8)
BASOPHILS # BLD: 0.02 K/UL (ref 0–0.12)
BILIRUB SERPL-MCNC: 0.4 MG/DL (ref 0.1–1.5)
BUN SERPL-MCNC: 7 MG/DL (ref 8–22)
CALCIUM SERPL-MCNC: 9.6 MG/DL (ref 8.5–10.5)
CHLORIDE SERPL-SCNC: 103 MMOL/L (ref 96–112)
CO2 SERPL-SCNC: 26 MMOL/L (ref 20–33)
COMMENT 1642: NORMAL
CREAT SERPL-MCNC: 1 MG/DL (ref 0.5–1.4)
D DIMER PPP IA.FEU-MCNC: <0.27 UG/ML (FEU) (ref 0–0.5)
DACRYOCYTES BLD QL SMEAR: NORMAL
EKG IMPRESSION: NORMAL
EOSINOPHIL # BLD AUTO: 0.17 K/UL (ref 0–0.51)
EOSINOPHIL NFR BLD: 1.4 % (ref 0–6.9)
ERYTHROCYTE [DISTWIDTH] IN BLOOD BY AUTOMATED COUNT: 58.6 FL (ref 35.9–50)
GLOBULIN SER CALC-MCNC: 3.6 G/DL (ref 1.9–3.5)
GLUCOSE SERPL-MCNC: 129 MG/DL (ref 65–99)
HCT VFR BLD AUTO: 43.7 % (ref 37–47)
HGB BLD-MCNC: 13 G/DL (ref 12–16)
HYPOCHROMIA BLD QL SMEAR: ABNORMAL
IMM GRANULOCYTES # BLD AUTO: 0.07 K/UL (ref 0–0.11)
IMM GRANULOCYTES NFR BLD AUTO: 0.6 % (ref 0–0.9)
LIPASE SERPL-CCNC: 28 U/L (ref 11–82)
LYMPHOCYTES # BLD AUTO: 1.92 K/UL (ref 1–4.8)
LYMPHOCYTES NFR BLD: 15.9 % (ref 22–41)
MACROCYTES BLD QL SMEAR: ABNORMAL
MCH RBC QN AUTO: 27.7 PG (ref 27–33)
MCHC RBC AUTO-ENTMCNC: 29.7 G/DL (ref 33.6–35)
MCV RBC AUTO: 93.2 FL (ref 81.4–97.8)
MONOCYTES # BLD AUTO: 0.58 K/UL (ref 0–0.85)
MONOCYTES NFR BLD AUTO: 4.8 % (ref 0–13.4)
MORPHOLOGY BLD-IMP: NORMAL
NEUTROPHILS # BLD AUTO: 9.29 K/UL (ref 2–7.15)
NEUTROPHILS NFR BLD: 77.1 % (ref 44–72)
NRBC # BLD AUTO: 0 K/UL
NRBC BLD-RTO: 0 /100 WBC
OVALOCYTES BLD QL SMEAR: NORMAL
PLATELET # BLD AUTO: 256 K/UL (ref 164–446)
PLATELET BLD QL SMEAR: NORMAL
PMV BLD AUTO: 11 FL (ref 9–12.9)
POIKILOCYTOSIS BLD QL SMEAR: NORMAL
POTASSIUM SERPL-SCNC: 4.4 MMOL/L (ref 3.6–5.5)
PROT SERPL-MCNC: 7.4 G/DL (ref 6–8.2)
RBC # BLD AUTO: 4.69 M/UL (ref 4.2–5.4)
RBC BLD AUTO: PRESENT
SODIUM SERPL-SCNC: 136 MMOL/L (ref 135–145)
TROPONIN T SERPL-MCNC: 21 NG/L (ref 6–19)
WBC # BLD AUTO: 12.1 K/UL (ref 4.8–10.8)

## 2021-05-06 PROCEDURE — 80053 COMPREHEN METABOLIC PANEL: CPT

## 2021-05-06 PROCEDURE — 71045 X-RAY EXAM CHEST 1 VIEW: CPT

## 2021-05-06 PROCEDURE — 83690 ASSAY OF LIPASE: CPT

## 2021-05-06 PROCEDURE — 85025 COMPLETE CBC W/AUTO DIFF WBC: CPT

## 2021-05-06 PROCEDURE — 85379 FIBRIN DEGRADATION QUANT: CPT

## 2021-05-06 PROCEDURE — 700101 HCHG RX REV CODE 250: Performed by: EMERGENCY MEDICINE

## 2021-05-06 PROCEDURE — A9270 NON-COVERED ITEM OR SERVICE: HCPCS | Performed by: EMERGENCY MEDICINE

## 2021-05-06 PROCEDURE — 700102 HCHG RX REV CODE 250 W/ 637 OVERRIDE(OP): Performed by: EMERGENCY MEDICINE

## 2021-05-06 PROCEDURE — 93005 ELECTROCARDIOGRAM TRACING: CPT | Performed by: EMERGENCY MEDICINE

## 2021-05-06 PROCEDURE — 94640 AIRWAY INHALATION TREATMENT: CPT

## 2021-05-06 PROCEDURE — 84484 ASSAY OF TROPONIN QUANT: CPT

## 2021-05-06 RX ORDER — ACETAMINOPHEN 500 MG
1000 TABLET ORAL ONCE
Status: COMPLETED | OUTPATIENT
Start: 2021-05-06 | End: 2021-05-06

## 2021-05-06 RX ORDER — ALUMINA, MAGNESIA, AND SIMETHICONE 2400; 2400; 240 MG/30ML; MG/30ML; MG/30ML
10 SUSPENSION ORAL ONCE
Status: COMPLETED | OUTPATIENT
Start: 2021-05-06 | End: 2021-05-06

## 2021-05-06 RX ORDER — IPRATROPIUM BROMIDE AND ALBUTEROL SULFATE 2.5; .5 MG/3ML; MG/3ML
3 SOLUTION RESPIRATORY (INHALATION)
Status: COMPLETED | OUTPATIENT
Start: 2021-05-06 | End: 2021-05-06

## 2021-05-06 RX ADMIN — IPRATROPIUM BROMIDE AND ALBUTEROL SULFATE 3 ML: .5; 2.5 SOLUTION RESPIRATORY (INHALATION) at 01:05

## 2021-05-06 RX ADMIN — ACETAMINOPHEN 1000 MG: 500 TABLET ORAL at 00:52

## 2021-05-06 RX ADMIN — ALUMINUM HYDROXIDE, MAGNESIUM HYDROXIDE, AND DIMETHICONE 10 ML: 400; 400; 40 SUSPENSION ORAL at 00:52

## 2021-05-06 NOTE — ED PROVIDER NOTES
"ED Provider Note    Scribed for Ciro Malone M.D. by Dede Sanchez. 5/6/2021,  12:15 AM.    Means of Arrival: EMS  History obtained from: Patient  History limited by: none    CHIEF COMPLAINT  Chief Complaint   Patient presents with    Chest Pain       HPI  Lorene Haro is a 45 y.o. female who presents to the Emergency Department via EMS for worsening chest pain onset 12 hours ago. She describes the pain as radiating to her back and feeling as if \"someone is trying to rip [her] heart out\". Per the patient, she took tylenol prior to arrival but this did not alleviate her symptoms; she says this pain is similar to what she experienced in January when she was seen and admitted to the ED for ACS evaluation. She denies new shortness of breath. No other exacerbating or alleviating factors were noted. She is on 2 L of supplementary oxygen at home.     Review of records reveal the patient was admitted for ACS evaluation in January and had a normal workup and stress test. She had multiple subsequent admissions for shortness of breath due to her COPD, as well as abdominal pain. She had a CT-abdomen-pelvis 5 days ago that showed no acute abnormalities.     REVIEW OF SYSTEMS  CARDIOVASCULAR:  See HPI  RESPIRATORY:  See HPI  See HPI for further details.   All other systems are negative.     PAST MEDICAL HISTORY  Past Medical History:   Diagnosis Date    Asthma     Bipolar 2 disorder (HCC)     Chickenpox     Chronic obstructive pulmonary disease (HCC)     Hypertension     Other psychological stress     Schizophrenic disorder (HCC)     Yeast infection of the skin 4/1/2021       FAMILY HISTORY  Family History   Problem Relation Age of Onset    No Known Problems Mother     Cancer Sister        SOCIAL HISTORY   reports that she quit smoking about 4 months ago. Her smoking use included cigarettes. She has a 20.00 pack-year smoking history. She has never used smokeless tobacco. She reports that she does not drink alcohol and does " not use drugs.    SURGICAL HISTORY  Past Surgical History:   Procedure Laterality Date    CHOLECYSTECTOMY      COLECTOMY      HYSTERECTOMY LAPAROSCOPY      KNEE ARTHROSCOPY Left        CURRENT MEDICATIONS  Current Outpatient Medications   Medication Instructions    acetaminophen (TYLENOL) 1,000 mg, Oral, ONCE    albuterol 108 (90 Base) MCG/ACT Aero Soln inhalation aerosol 2 Puffs, Inhalation, EVERY 6 HOURS PRN    aripiprazole (ABILIFY) 30 mg, Oral, EVERY MORNING    atorvastatin (LIPITOR) 20 mg, Oral, DAILY    cephALEXin (KEFLEX) 500 mg, Oral, 4 TIMES DAILY    fluticasone (FLONASE) 50 MCG/ACT nasal spray 1 Spray, Nasal, 2 TIMES DAILY    furosemide (LASIX) 20 mg, Oral, 2 TIMES DAILY    lithium ER (LITHOBID) 300-600 mg, Oral, 2 TIMES DAILY, 1 caps = 300 mg every morning<BR>2 caps = 600 mg every evening    metoprolol SR (TOPROL XL) 25 mg, Oral, DAILY    montelukast (SINGULAIR) 10 mg, Oral, EVERY BEDTIME    nystatin (MYCOSTATIN) powder Apply underneath breasts and on inner thigh area twice a day for fungal infection    potassium chloride SA (KDUR) 20 MEQ Tab CR 20 mEq, Oral, DAILY    tiotropium (SPIRIVA HANDIHALER) 18 MCG Cap 1 capsule, Inhalation, DAILY    warfarin (COUMADIN) 6 MG Tab Take 1 tablet by mouth daily or as directed by anticoagulation clinic     ALLERGIES  Allergies   Allergen Reactions    Amoxicillin Rash and Itching     Tolerates cephalosporins, pt reports that she gets a rash all over her body and gets itchy    Penicillin G Rash and Itching     pt reports that she gets a rash all over her body and gets itchy       PHYSICAL EXAM  VITAL SIGNS: /80   Pulse 78   Temp 36.9 °C (98.4 °F) (Tympanic)   Resp 18   Wt (!) 132 kg (290 lb)   LMP  (LMP Unknown)   SpO2 98%   BMI 54.80 kg/m²    Gen: Alert, no acute distress  HEENT: ATNC  Eyes: PERRL, EOMI, normal conjunctiva.   Neck: trachea midline  Resp: Diminished air exchange with wheezing. no respiratory distress. No crackles  CV: No JVD, RRR. No M/R/G.  Equal radial pulses  Abd: soft. non-distended, non-tender  Ext: No deformities. Pitting edema in bilateral lower extremities. No pedal edema, no calf tenderness. Chronic venous stasis changes in bilateral lower extremities  Psych: normal mood  Neuro: speech fluent      DIAGNOSTIC STUDIES / PROCEDURES     EKG  Results for orders placed or performed during the hospital encounter of 21   EKG   Result Value Ref Range    Report       [facility] Emergency Dept.    Test Date:  2021  Pt Name:    ADELINA MILLAN                Department: ER  MRN:        [id number]                      Room:       [facility]  Gender:     Female                       Technician: [id number]  :        [id number]                   Requested By:ER TRIAGE PROTOCOL  Order #:    [id number]                    Reading MD: Ciro Malone    Measurements  Intervals                                Axis  Rate:       86                           P:          52  DE:         124                          QRS:        54  QRSD:       92                           T:          9  QT:         348  QTc:        417    Interpretive Statements  SINUS RHYTHM  Compared to ECG 2021 20:38:08  Accelerated junctional rhythm no longer present  Electronically Signed On 2021 2:41:20 PDT by Ciro Malone          LABS  Labs Reviewed   CBC WITH DIFFERENTIAL - Abnormal; Notable for the following components:       Result Value    WBC 12.1 (*)     MCHC 29.7 (*)     RDW 58.6 (*)     Neutrophils-Polys 77.10 (*)     Lymphocytes 15.90 (*)     Neutrophils (Absolute) 9.29 (*)     All other components within normal limits   COMP METABOLIC PANEL - Abnormal; Notable for the following components:    Glucose 129 (*)     Bun 7 (*)     Alkaline Phosphatase 116 (*)     Globulin 3.6 (*)     All other components within normal limits   TROPONIN - Abnormal; Notable for the following components:    Troponin T 21 (*)     All other components within normal limits   ESTIMATED GFR -  Abnormal; Notable for the following components:    GFR If Non  59 (*)     All other components within normal limits   D-DIMER   LIPASE   PERIPHERAL SMEAR REVIEW   PLATELET ESTIMATE   MORPHOLOGY   DIFFERENTIAL COMMENT     All labs reviewed by me.    RADIOLOGY  DX-CHEST-PORTABLE (1 VIEW)   Final Result      No acute cardiopulmonary abnormality.        The radiologist’s interpretation of all radiology studies have been reviewed by me.    COURSE & MEDICAL DECISION MAKING  Pertinent Labs & Imaging studies reviewed. (See chart for details)    12:15 AM Patient seen and examined at bedside. Ordered for labs and imaging to evaluate. Patient will be treated with Duoneb, mag hydrox-al hydrox-simeth 10 mL, and Tylenol 1000 mg for her symptoms.     1:03 AM Recheck: Patient re-evaluated at beside. Patient reports feeling improved. Discussed patient's condition and treatment plan. Patient's lab results discussed. The patient understood and is in agreement.'    1:40 AM Disposition delayed as the patient's chest X-ray has not yet been performed.      2:24 AM Recheck: Patient re-evaluated at bedside. Patient reports feeling improved. Discussed patient's condition and treatment plan, including plans for discharge. Patient's lab and radiology results discussed. Gave discharge instructions and return precautions. The patient understood and is in agreement. The patient was given an opportunity to ask questions at this time.     Medical Decision Making:  Heart Score: 4    Patient presents with chest pain that she describes as someone try to tear her heart out.  No clinical signs to suggest aortic dissection.  D-dimer negative for PE, which also makes aortic dissection less likely.  Chest x-ray reassuring, no signs of heart failure, abnormal aortic contour.  Patient's troponin is the same as it was during her last hospitalization.  EKG nonischemic.  Based on the prolonged duration of symptoms, I do not believe serial  troponin is required.  Patient was given a breathing treatment and is on her home oxygen level.  At this time, low suspicion for ACS.  Patient will be discharged home with return precautions    PPE Note: I personally donned appropriate PPE for all patient encounters during this visit, including wearing a mask, gloves, and eye protection. Scribe remained outside the patient's room and did not have any contact with the patient for the duration of patient encounter.      The patient will return for new or worsening symptoms and is stable at the time of discharge.    The patient is referred to a primary physician for blood pressure management, diabetic screening, and for all other preventative health concerns.    DISPOSITION:  Patient will be discharged home in stable condition.    FOLLOW UP:  SHANKAR Gleason  [address] [place] [id number] [phone]    Schedule an appointment as soon as possible for a visit       [facility], Emergency Dept  [address] [facility] [id number] [phone]    If symptoms worsen      FINAL IMPRESSION  1. Chest pain, unspecified type            I, Dede Sanchez (Scribraul), am scribing for, and in the presence of, Ciro Malone M.D..    Electronically signed by: Dede Sanchez (Scribraul), 5/6/2021    ICiro M.D. personally performed the services described in this documentation, as scribed by Dede Sanchez in my presence, and it is both accurate and complete.    The note accurately reflects work and decisions made by me.  Ciro Malone M.D.  5/6/2021  5:40 AM    C    This dictation was created using voice recognition software. The accuracy of the dictation is limited to the abilities of the software. I expect there may be some errors of grammar and possibly content. The nursing notes were reviewed and certain aspects of this information were incorporated into this note.

## 2021-05-06 NOTE — ED TRIAGE NOTES
Lorene Haro   45 y.o. female   Chief Complaint   Patient presents with   • Chest Pain      The patient presents to the ED via EMS for evaluation related to chest pain. The patient reports that the pain has persisted for 12 hours and has progressively gotten worse. She reports that the pain radiates through to her back. She describes the pain as ripping and tearing. The patient denies shortness of breath. She states that she uses 2 L oxygen at baseline and ambulates with a walker.     Right Arm: 109/80  P: 83  Left Arm: 121/76    P:84    The patient denies knowingly being around anyone with suspected or confirmed COVID 19.

## 2021-05-06 NOTE — DISCHARGE INSTRUCTIONS
You were seen in the ED for chest pain. Your physical exam, EKG, chest X-ray and bloodwork evaluating your heart were performed. All of these tests were reassuring. It does not appear that you had a heart attack. The cause of your pain is unclear, however it does not appear to be dangerous at this time. At this time it is safe for you to go home.    Return to the ED if you develop chest pain, lightheadedness, shortness of breath, if your pain does not improve, or for any other new or concerning symptoms.

## 2021-05-08 ENCOUNTER — APPOINTMENT (OUTPATIENT)
Dept: RADIOLOGY | Facility: MEDICAL CENTER | Age: 46
End: 2021-05-08
Attending: EMERGENCY MEDICINE
Payer: MEDICAID

## 2021-05-08 ENCOUNTER — HOSPITAL ENCOUNTER (OUTPATIENT)
Facility: MEDICAL CENTER | Age: 46
End: 2021-05-18
Attending: EMERGENCY MEDICINE | Admitting: STUDENT IN AN ORGANIZED HEALTH CARE EDUCATION/TRAINING PROGRAM
Payer: MEDICAID

## 2021-05-08 DIAGNOSIS — B37.2 YEAST INFECTION OF THE SKIN: ICD-10-CM

## 2021-05-08 DIAGNOSIS — F20.9 SCHIZOPHRENIA, UNSPECIFIED TYPE (HCC): ICD-10-CM

## 2021-05-08 DIAGNOSIS — G47.33 OSA AND COPD OVERLAP SYNDROME (HCC): ICD-10-CM

## 2021-05-08 DIAGNOSIS — J96.21 ACUTE ON CHRONIC RESPIRATORY FAILURE WITH HYPOXIA (HCC): ICD-10-CM

## 2021-05-08 DIAGNOSIS — J44.1 ACUTE EXACERBATION OF CHRONIC OBSTRUCTIVE PULMONARY DISEASE (COPD) (HCC): ICD-10-CM

## 2021-05-08 DIAGNOSIS — R53.81 DEBILITY: ICD-10-CM

## 2021-05-08 DIAGNOSIS — F31.9 BIPOLAR AFFECTIVE DISORDER, REMISSION STATUS UNSPECIFIED (HCC): ICD-10-CM

## 2021-05-08 DIAGNOSIS — R79.89 ELEVATED BRAIN NATRIURETIC PEPTIDE (BNP) LEVEL: ICD-10-CM

## 2021-05-08 DIAGNOSIS — I48.0 PAROXYSMAL ATRIAL FIBRILLATION (HCC): ICD-10-CM

## 2021-05-08 DIAGNOSIS — J44.9 CHRONIC OBSTRUCTIVE PULMONARY DISEASE, UNSPECIFIED COPD TYPE (HCC): ICD-10-CM

## 2021-05-08 DIAGNOSIS — R07.9 CHEST PAIN, RULE OUT ACUTE MYOCARDIAL INFARCTION: ICD-10-CM

## 2021-05-08 DIAGNOSIS — R73.9 BLOOD GLUCOSE ELEVATED: ICD-10-CM

## 2021-05-08 DIAGNOSIS — J44.9 OSA AND COPD OVERLAP SYNDROME (HCC): ICD-10-CM

## 2021-05-08 LAB
ALBUMIN SERPL BCP-MCNC: 3.7 G/DL (ref 3.2–4.9)
ALBUMIN/GLOB SERPL: 1.3 G/DL
ALP SERPL-CCNC: 105 U/L (ref 30–99)
ALT SERPL-CCNC: 20 U/L (ref 2–50)
ANION GAP SERPL CALC-SCNC: 5 MMOL/L (ref 7–16)
AST SERPL-CCNC: 13 U/L (ref 12–45)
BILIRUB SERPL-MCNC: 0.5 MG/DL (ref 0.1–1.5)
BUN SERPL-MCNC: 9 MG/DL (ref 8–22)
CALCIUM SERPL-MCNC: 8.9 MG/DL (ref 8.5–10.5)
CHLORIDE SERPL-SCNC: 104 MMOL/L (ref 96–112)
CO2 SERPL-SCNC: 32 MMOL/L (ref 20–33)
CREAT SERPL-MCNC: 0.96 MG/DL (ref 0.5–1.4)
FLUAV RNA SPEC QL NAA+PROBE: NEGATIVE
FLUBV RNA SPEC QL NAA+PROBE: NEGATIVE
GLOBULIN SER CALC-MCNC: 2.9 G/DL (ref 1.9–3.5)
GLUCOSE SERPL-MCNC: 89 MG/DL (ref 65–99)
INR PPP: 1.62 (ref 0.87–1.13)
NT-PROBNP SERPL IA-MCNC: 532 PG/ML (ref 0–125)
POTASSIUM SERPL-SCNC: 4.2 MMOL/L (ref 3.6–5.5)
PROT SERPL-MCNC: 6.6 G/DL (ref 6–8.2)
PROTHROMBIN TIME: 19.7 SEC (ref 12–14.6)
RSV RNA SPEC QL NAA+PROBE: NEGATIVE
SARS-COV-2 RNA RESP QL NAA+PROBE: NOTDETECTED
SODIUM SERPL-SCNC: 141 MMOL/L (ref 135–145)
SPECIMEN SOURCE: NORMAL
TROPONIN T SERPL-MCNC: 21 NG/L (ref 6–19)

## 2021-05-08 PROCEDURE — 36415 COLL VENOUS BLD VENIPUNCTURE: CPT

## 2021-05-08 PROCEDURE — 94760 N-INVAS EAR/PLS OXIMETRY 1: CPT

## 2021-05-08 PROCEDURE — 80053 COMPREHEN METABOLIC PANEL: CPT

## 2021-05-08 PROCEDURE — 71045 X-RAY EXAM CHEST 1 VIEW: CPT

## 2021-05-08 PROCEDURE — C9803 HOPD COVID-19 SPEC COLLECT: HCPCS | Performed by: EMERGENCY MEDICINE

## 2021-05-08 PROCEDURE — 96375 TX/PRO/DX INJ NEW DRUG ADDON: CPT

## 2021-05-08 PROCEDURE — 96374 THER/PROPH/DIAG INJ IV PUSH: CPT

## 2021-05-08 PROCEDURE — 94640 AIRWAY INHALATION TREATMENT: CPT

## 2021-05-08 PROCEDURE — 700101 HCHG RX REV CODE 250: Performed by: EMERGENCY MEDICINE

## 2021-05-08 PROCEDURE — 85610 PROTHROMBIN TIME: CPT

## 2021-05-08 PROCEDURE — 700111 HCHG RX REV CODE 636 W/ 250 OVERRIDE (IP): Performed by: EMERGENCY MEDICINE

## 2021-05-08 PROCEDURE — 84484 ASSAY OF TROPONIN QUANT: CPT

## 2021-05-08 PROCEDURE — 0241U HCHG SARS-COV-2 COVID-19 NFCT DS RESP RNA 4 TRGT MIC: CPT

## 2021-05-08 PROCEDURE — 85007 BL SMEAR W/DIFF WBC COUNT: CPT

## 2021-05-08 PROCEDURE — 93005 ELECTROCARDIOGRAM TRACING: CPT | Performed by: EMERGENCY MEDICINE

## 2021-05-08 PROCEDURE — 99285 EMERGENCY DEPT VISIT HI MDM: CPT

## 2021-05-08 PROCEDURE — 83880 ASSAY OF NATRIURETIC PEPTIDE: CPT

## 2021-05-08 PROCEDURE — 85027 COMPLETE CBC AUTOMATED: CPT

## 2021-05-08 RX ORDER — METHYLPREDNISOLONE SODIUM SUCCINATE 125 MG/2ML
125 INJECTION, POWDER, LYOPHILIZED, FOR SOLUTION INTRAMUSCULAR; INTRAVENOUS ONCE
Status: COMPLETED | OUTPATIENT
Start: 2021-05-08 | End: 2021-05-08

## 2021-05-08 RX ORDER — FUROSEMIDE 10 MG/ML
40 INJECTION INTRAMUSCULAR; INTRAVENOUS ONCE
Status: COMPLETED | OUTPATIENT
Start: 2021-05-08 | End: 2021-05-08

## 2021-05-08 RX ADMIN — METHYLPREDNISOLONE SODIUM SUCCINATE 125 MG: 125 INJECTION, POWDER, FOR SOLUTION INTRAMUSCULAR; INTRAVENOUS at 22:02

## 2021-05-08 RX ADMIN — ALBUTEROL SULFATE 2.5 MG: 2.5 SOLUTION RESPIRATORY (INHALATION) at 22:05

## 2021-05-08 RX ADMIN — FUROSEMIDE 40 MG: 10 INJECTION, SOLUTION INTRAMUSCULAR; INTRAVENOUS at 23:07

## 2021-05-09 PROBLEM — J96.20 ACUTE ON CHRONIC RESPIRATORY FAILURE (HCC): Status: ACTIVE | Noted: 2019-11-18

## 2021-05-09 LAB
ANISOCYTOSIS BLD QL SMEAR: ABNORMAL
BASOPHILS # BLD AUTO: 0 % (ref 0–1.8)
BASOPHILS # BLD: 0 K/UL (ref 0–0.12)
EKG IMPRESSION: NORMAL
EKG IMPRESSION: NORMAL
EOSINOPHIL # BLD AUTO: 0 K/UL (ref 0–0.51)
EOSINOPHIL NFR BLD: 0 % (ref 0–6.9)
ERYTHROCYTE [DISTWIDTH] IN BLOOD BY AUTOMATED COUNT: 58.4 FL (ref 35.9–50)
EST. AVERAGE GLUCOSE BLD GHB EST-MCNC: 123 MG/DL
FERRITIN SERPL-MCNC: 85.1 NG/ML (ref 10–291)
GLUCOSE BLD-MCNC: 135 MG/DL (ref 65–99)
GLUCOSE BLD-MCNC: 171 MG/DL (ref 65–99)
GLUCOSE BLD-MCNC: 205 MG/DL (ref 65–99)
GLUCOSE BLD-MCNC: 212 MG/DL (ref 65–99)
HBA1C MFR BLD: 5.9 % (ref 4–5.6)
HCT VFR BLD AUTO: 36.6 % (ref 37–47)
HGB BLD-MCNC: 10.8 G/DL (ref 12–16)
INR PPP: 1.64 (ref 0.87–1.13)
IRON SATN MFR SERPL: 10 % (ref 15–55)
IRON SERPL-MCNC: 27 UG/DL (ref 40–170)
LACTATE BLD-SCNC: 2.9 MMOL/L (ref 0.5–2)
LITHIUM SERPL-MCNC: 0.2 MMOL/L (ref 0.6–1.2)
LYMPHOCYTES # BLD AUTO: 1.04 K/UL (ref 1–4.8)
LYMPHOCYTES NFR BLD: 9.4 % (ref 22–41)
MACROCYTES BLD QL SMEAR: ABNORMAL
MANUAL DIFF BLD: NORMAL
MCH RBC QN AUTO: 27.7 PG (ref 27–33)
MCHC RBC AUTO-ENTMCNC: 29.5 G/DL (ref 33.6–35)
MCV RBC AUTO: 93.8 FL (ref 81.4–97.8)
MICROCYTES BLD QL SMEAR: ABNORMAL
MONOCYTES # BLD AUTO: 0 K/UL (ref 0–0.85)
MONOCYTES NFR BLD AUTO: 0 % (ref 0–13.4)
MORPHOLOGY BLD-IMP: NORMAL
NEUTROPHILS # BLD AUTO: 10.06 K/UL (ref 2–7.15)
NEUTROPHILS NFR BLD: 90.6 % (ref 44–72)
NRBC # BLD AUTO: 0 K/UL
NRBC BLD-RTO: 0 /100 WBC
NT-PROBNP SERPL IA-MCNC: 457 PG/ML (ref 0–125)
PLATELET # BLD AUTO: 228 K/UL (ref 164–446)
PLATELET BLD QL SMEAR: NORMAL
PMV BLD AUTO: 11.3 FL (ref 9–12.9)
POLYCHROMASIA BLD QL SMEAR: NORMAL
PROCALCITONIN SERPL-MCNC: <0.05 NG/ML
PROTHROMBIN TIME: 19.9 SEC (ref 12–14.6)
RBC # BLD AUTO: 3.9 M/UL (ref 4.2–5.4)
RBC BLD AUTO: PRESENT
T4 FREE SERPL-MCNC: 0.96 NG/DL (ref 0.93–1.7)
TIBC SERPL-MCNC: 277 UG/DL (ref 250–450)
TROPONIN T SERPL-MCNC: 18 NG/L (ref 6–19)
TSH SERPL DL<=0.005 MIU/L-ACNC: 1.23 UIU/ML (ref 0.38–5.33)
UIBC SERPL-MCNC: 250 UG/DL (ref 110–370)
WBC # BLD AUTO: 11.1 K/UL (ref 4.8–10.8)

## 2021-05-09 PROCEDURE — 94660 CPAP INITIATION&MGMT: CPT

## 2021-05-09 PROCEDURE — 84484 ASSAY OF TROPONIN QUANT: CPT

## 2021-05-09 PROCEDURE — 94760 N-INVAS EAR/PLS OXIMETRY 1: CPT

## 2021-05-09 PROCEDURE — 94640 AIRWAY INHALATION TREATMENT: CPT

## 2021-05-09 PROCEDURE — 36415 COLL VENOUS BLD VENIPUNCTURE: CPT

## 2021-05-09 PROCEDURE — 99219 PR INITIAL OBSERVATION CARE,LEVL II: CPT | Performed by: STUDENT IN AN ORGANIZED HEALTH CARE EDUCATION/TRAINING PROGRAM

## 2021-05-09 PROCEDURE — G0378 HOSPITAL OBSERVATION PER HR: HCPCS

## 2021-05-09 PROCEDURE — 83036 HEMOGLOBIN GLYCOSYLATED A1C: CPT

## 2021-05-09 PROCEDURE — 97165 OT EVAL LOW COMPLEX 30 MIN: CPT

## 2021-05-09 PROCEDURE — 83540 ASSAY OF IRON: CPT

## 2021-05-09 PROCEDURE — 700111 HCHG RX REV CODE 636 W/ 250 OVERRIDE (IP): Performed by: STUDENT IN AN ORGANIZED HEALTH CARE EDUCATION/TRAINING PROGRAM

## 2021-05-09 PROCEDURE — 83550 IRON BINDING TEST: CPT

## 2021-05-09 PROCEDURE — 85610 PROTHROMBIN TIME: CPT

## 2021-05-09 PROCEDURE — 82962 GLUCOSE BLOOD TEST: CPT

## 2021-05-09 PROCEDURE — 82728 ASSAY OF FERRITIN: CPT

## 2021-05-09 PROCEDURE — 93005 ELECTROCARDIOGRAM TRACING: CPT | Performed by: INTERNAL MEDICINE

## 2021-05-09 PROCEDURE — 83605 ASSAY OF LACTIC ACID: CPT

## 2021-05-09 PROCEDURE — 84145 PROCALCITONIN (PCT): CPT

## 2021-05-09 PROCEDURE — 700102 HCHG RX REV CODE 250 W/ 637 OVERRIDE(OP): Performed by: STUDENT IN AN ORGANIZED HEALTH CARE EDUCATION/TRAINING PROGRAM

## 2021-05-09 PROCEDURE — 96372 THER/PROPH/DIAG INJ SC/IM: CPT

## 2021-05-09 PROCEDURE — 96376 TX/PRO/DX INJ SAME DRUG ADON: CPT

## 2021-05-09 PROCEDURE — 84443 ASSAY THYROID STIM HORMONE: CPT

## 2021-05-09 PROCEDURE — 700101 HCHG RX REV CODE 250: Performed by: EMERGENCY MEDICINE

## 2021-05-09 PROCEDURE — 83880 ASSAY OF NATRIURETIC PEPTIDE: CPT

## 2021-05-09 PROCEDURE — A9270 NON-COVERED ITEM OR SERVICE: HCPCS | Performed by: STUDENT IN AN ORGANIZED HEALTH CARE EDUCATION/TRAINING PROGRAM

## 2021-05-09 PROCEDURE — 96375 TX/PRO/DX INJ NEW DRUG ADDON: CPT

## 2021-05-09 PROCEDURE — 80178 ASSAY OF LITHIUM: CPT

## 2021-05-09 PROCEDURE — 93010 ELECTROCARDIOGRAM REPORT: CPT | Performed by: INTERNAL MEDICINE

## 2021-05-09 PROCEDURE — 84439 ASSAY OF FREE THYROXINE: CPT

## 2021-05-09 RX ORDER — MONTELUKAST SODIUM 10 MG/1
10 TABLET ORAL
Status: DISCONTINUED | OUTPATIENT
Start: 2021-05-09 | End: 2021-05-18 | Stop reason: HOSPADM

## 2021-05-09 RX ORDER — ATORVASTATIN CALCIUM 20 MG/1
20 TABLET, FILM COATED ORAL EVERY EVENING
Status: DISCONTINUED | OUTPATIENT
Start: 2021-05-09 | End: 2021-05-18 | Stop reason: HOSPADM

## 2021-05-09 RX ORDER — BISACODYL 10 MG
10 SUPPOSITORY, RECTAL RECTAL
Status: DISCONTINUED | OUTPATIENT
Start: 2021-05-09 | End: 2021-05-18 | Stop reason: HOSPADM

## 2021-05-09 RX ORDER — ASPIRIN 325 MG
325 TABLET ORAL DAILY
Status: DISCONTINUED | OUTPATIENT
Start: 2021-05-09 | End: 2021-05-09

## 2021-05-09 RX ORDER — METHYLPREDNISOLONE SODIUM SUCCINATE 125 MG/2ML
62.5 INJECTION, POWDER, LYOPHILIZED, FOR SOLUTION INTRAMUSCULAR; INTRAVENOUS EVERY 12 HOURS
Status: DISCONTINUED | OUTPATIENT
Start: 2021-05-09 | End: 2021-05-10

## 2021-05-09 RX ORDER — FLUTICASONE PROPIONATE 50 MCG
1 SPRAY, SUSPENSION (ML) NASAL 2 TIMES DAILY
Status: DISCONTINUED | OUTPATIENT
Start: 2021-05-09 | End: 2021-05-18 | Stop reason: HOSPADM

## 2021-05-09 RX ORDER — AZITHROMYCIN 250 MG/1
500 TABLET, FILM COATED ORAL DAILY
Status: COMPLETED | OUTPATIENT
Start: 2021-05-09 | End: 2021-05-11

## 2021-05-09 RX ORDER — HEPARIN SODIUM 5000 [USP'U]/ML
5000 INJECTION, SOLUTION INTRAVENOUS; SUBCUTANEOUS EVERY 8 HOURS
Status: DISCONTINUED | OUTPATIENT
Start: 2021-05-09 | End: 2021-05-09

## 2021-05-09 RX ORDER — PROCHLORPERAZINE EDISYLATE 5 MG/ML
5-10 INJECTION INTRAMUSCULAR; INTRAVENOUS EVERY 4 HOURS PRN
Status: DISCONTINUED | OUTPATIENT
Start: 2021-05-09 | End: 2021-05-18 | Stop reason: HOSPADM

## 2021-05-09 RX ORDER — ONDANSETRON 2 MG/ML
4 INJECTION INTRAMUSCULAR; INTRAVENOUS EVERY 4 HOURS PRN
Status: DISCONTINUED | OUTPATIENT
Start: 2021-05-09 | End: 2021-05-18 | Stop reason: HOSPADM

## 2021-05-09 RX ORDER — ONDANSETRON 4 MG/1
4 TABLET, ORALLY DISINTEGRATING ORAL EVERY 4 HOURS PRN
Status: DISCONTINUED | OUTPATIENT
Start: 2021-05-09 | End: 2021-05-18 | Stop reason: HOSPADM

## 2021-05-09 RX ORDER — FUROSEMIDE 20 MG/1
20 TABLET ORAL 2 TIMES DAILY
Status: DISCONTINUED | OUTPATIENT
Start: 2021-05-09 | End: 2021-05-18 | Stop reason: HOSPADM

## 2021-05-09 RX ORDER — POTASSIUM CHLORIDE 20 MEQ/1
20 TABLET, EXTENDED RELEASE ORAL DAILY
Status: DISCONTINUED | OUTPATIENT
Start: 2021-05-09 | End: 2021-05-18 | Stop reason: HOSPADM

## 2021-05-09 RX ORDER — WARFARIN SODIUM 6 MG/1
6 TABLET ORAL DAILY
Status: DISCONTINUED | OUTPATIENT
Start: 2021-05-09 | End: 2021-05-11

## 2021-05-09 RX ORDER — ALBUTEROL SULFATE 90 UG/1
2 AEROSOL, METERED RESPIRATORY (INHALATION) EVERY 6 HOURS PRN
Status: DISCONTINUED | OUTPATIENT
Start: 2021-05-09 | End: 2021-05-18 | Stop reason: HOSPADM

## 2021-05-09 RX ORDER — AMOXICILLIN 250 MG
2 CAPSULE ORAL 2 TIMES DAILY
Status: DISCONTINUED | OUTPATIENT
Start: 2021-05-09 | End: 2021-05-18 | Stop reason: HOSPADM

## 2021-05-09 RX ORDER — ASPIRIN 300 MG/1
300 SUPPOSITORY RECTAL DAILY
Status: DISCONTINUED | OUTPATIENT
Start: 2021-05-09 | End: 2021-05-09

## 2021-05-09 RX ORDER — POLYETHYLENE GLYCOL 3350 17 G/17G
1 POWDER, FOR SOLUTION ORAL
Status: DISCONTINUED | OUTPATIENT
Start: 2021-05-09 | End: 2021-05-18 | Stop reason: HOSPADM

## 2021-05-09 RX ORDER — ACETAMINOPHEN 325 MG/1
650 TABLET ORAL EVERY 6 HOURS PRN
Status: DISCONTINUED | OUTPATIENT
Start: 2021-05-09 | End: 2021-05-18 | Stop reason: HOSPADM

## 2021-05-09 RX ORDER — ASPIRIN 81 MG/1
324 TABLET, CHEWABLE ORAL DAILY
Status: DISCONTINUED | OUTPATIENT
Start: 2021-05-09 | End: 2021-05-09

## 2021-05-09 RX ORDER — LABETALOL HYDROCHLORIDE 5 MG/ML
10 INJECTION, SOLUTION INTRAVENOUS EVERY 4 HOURS PRN
Status: DISCONTINUED | OUTPATIENT
Start: 2021-05-09 | End: 2021-05-18 | Stop reason: HOSPADM

## 2021-05-09 RX ORDER — PROMETHAZINE HYDROCHLORIDE 25 MG/1
12.5-25 TABLET ORAL EVERY 4 HOURS PRN
Status: DISCONTINUED | OUTPATIENT
Start: 2021-05-09 | End: 2021-05-18 | Stop reason: HOSPADM

## 2021-05-09 RX ORDER — NYSTATIN 100000 [USP'U]/G
1 POWDER TOPICAL 2 TIMES DAILY
Status: DISPENSED | OUTPATIENT
Start: 2021-05-09 | End: 2021-05-16

## 2021-05-09 RX ORDER — LITHIUM CARBONATE 300 MG/1
600 TABLET, FILM COATED, EXTENDED RELEASE ORAL EVERY EVENING
Status: DISCONTINUED | OUTPATIENT
Start: 2021-05-09 | End: 2021-05-18 | Stop reason: HOSPADM

## 2021-05-09 RX ORDER — METOPROLOL SUCCINATE 25 MG/1
25 TABLET, EXTENDED RELEASE ORAL DAILY
Status: DISCONTINUED | OUTPATIENT
Start: 2021-05-09 | End: 2021-05-18 | Stop reason: HOSPADM

## 2021-05-09 RX ORDER — ARIPIPRAZOLE 10 MG/1
30 TABLET ORAL EVERY MORNING
Status: DISCONTINUED | OUTPATIENT
Start: 2021-05-09 | End: 2021-05-18 | Stop reason: HOSPADM

## 2021-05-09 RX ORDER — PROMETHAZINE HYDROCHLORIDE 25 MG/1
12.5-25 SUPPOSITORY RECTAL EVERY 4 HOURS PRN
Status: DISCONTINUED | OUTPATIENT
Start: 2021-05-09 | End: 2021-05-18 | Stop reason: HOSPADM

## 2021-05-09 RX ORDER — DEXTROSE MONOHYDRATE 25 G/50ML
50 INJECTION, SOLUTION INTRAVENOUS
Status: DISCONTINUED | OUTPATIENT
Start: 2021-05-09 | End: 2021-05-13

## 2021-05-09 RX ORDER — LITHIUM CARBONATE 300 MG/1
300 TABLET, FILM COATED, EXTENDED RELEASE ORAL EVERY MORNING
Status: DISCONTINUED | OUTPATIENT
Start: 2021-05-09 | End: 2021-05-18 | Stop reason: HOSPADM

## 2021-05-09 RX ORDER — IPRATROPIUM BROMIDE AND ALBUTEROL SULFATE 2.5; .5 MG/3ML; MG/3ML
3 SOLUTION RESPIRATORY (INHALATION)
Status: DISCONTINUED | OUTPATIENT
Start: 2021-05-09 | End: 2021-05-18 | Stop reason: HOSPADM

## 2021-05-09 RX ORDER — GUAIFENESIN/DEXTROMETHORPHAN 100-10MG/5
10 SYRUP ORAL EVERY 6 HOURS PRN
Status: DISCONTINUED | OUTPATIENT
Start: 2021-05-09 | End: 2021-05-18 | Stop reason: HOSPADM

## 2021-05-09 RX ADMIN — NYSTATIN 100000 UNITS: 100000 POWDER TOPICAL at 18:27

## 2021-05-09 RX ADMIN — ACETAMINOPHEN 650 MG: 325 TABLET, FILM COATED ORAL at 11:37

## 2021-05-09 RX ADMIN — ALBUTEROL SULFATE 2 PUFF: 90 AEROSOL, METERED RESPIRATORY (INHALATION) at 23:33

## 2021-05-09 RX ADMIN — INSULIN HUMAN 2 UNITS: 100 INJECTION, SOLUTION PARENTERAL at 03:01

## 2021-05-09 RX ADMIN — AZITHROMYCIN MONOHYDRATE 500 MG: 250 TABLET ORAL at 05:15

## 2021-05-09 RX ADMIN — POTASSIUM CHLORIDE 20 MEQ: 1500 TABLET, EXTENDED RELEASE ORAL at 05:15

## 2021-05-09 RX ADMIN — FUROSEMIDE 20 MG: 40 TABLET ORAL at 05:16

## 2021-05-09 RX ADMIN — MONTELUKAST 10 MG: 10 TABLET, FILM COATED ORAL at 20:39

## 2021-05-09 RX ADMIN — METOPROLOL SUCCINATE 25 MG: 25 TABLET, EXTENDED RELEASE ORAL at 05:16

## 2021-05-09 RX ADMIN — TIOTROPIUM BROMIDE INHALATION SPRAY 5 MCG: 3.12 SPRAY, METERED RESPIRATORY (INHALATION) at 09:53

## 2021-05-09 RX ADMIN — METHYLPREDNISOLONE SODIUM SUCCINATE 62.5 MG: 125 INJECTION, POWDER, FOR SOLUTION INTRAMUSCULAR; INTRAVENOUS at 18:28

## 2021-05-09 RX ADMIN — INSULIN HUMAN 1 UNITS: 100 INJECTION, SOLUTION PARENTERAL at 17:50

## 2021-05-09 RX ADMIN — METHYLPREDNISOLONE SODIUM SUCCINATE 62.5 MG: 125 INJECTION, POWDER, FOR SOLUTION INTRAMUSCULAR; INTRAVENOUS at 05:16

## 2021-05-09 RX ADMIN — WARFARIN SODIUM 6 MG: 6 TABLET ORAL at 18:27

## 2021-05-09 RX ADMIN — LITHIUM CARBONATE 600 MG: 300 TABLET, FILM COATED, EXTENDED RELEASE ORAL at 18:27

## 2021-05-09 RX ADMIN — ARIPIPRAZOLE 30 MG: 10 TABLET ORAL at 05:16

## 2021-05-09 RX ADMIN — FLUTICASONE PROPIONATE 50 MCG: 50 SPRAY, METERED NASAL at 14:04

## 2021-05-09 RX ADMIN — DOCUSATE SODIUM 50 MG AND SENNOSIDES 8.6 MG 2 TABLET: 8.6; 5 TABLET, FILM COATED ORAL at 18:26

## 2021-05-09 RX ADMIN — ALBUTEROL SULFATE 2.5 MG: 2.5 SOLUTION RESPIRATORY (INHALATION) at 00:33

## 2021-05-09 RX ADMIN — ASPIRIN 325 MG ORAL TABLET 325 MG: 325 PILL ORAL at 05:16

## 2021-05-09 RX ADMIN — ATORVASTATIN CALCIUM 20 MG: 20 TABLET, FILM COATED ORAL at 18:28

## 2021-05-09 RX ADMIN — LITHIUM CARBONATE 300 MG: 300 TABLET, FILM COATED, EXTENDED RELEASE ORAL at 05:16

## 2021-05-09 RX ADMIN — INSULIN HUMAN 2 UNITS: 100 INJECTION, SOLUTION PARENTERAL at 06:20

## 2021-05-09 RX ADMIN — FLUTICASONE PROPIONATE 50 MCG: 50 SPRAY, METERED NASAL at 02:38

## 2021-05-09 ASSESSMENT — COGNITIVE AND FUNCTIONAL STATUS - GENERAL
TURNING FROM BACK TO SIDE WHILE IN FLAT BAD: A LITTLE
DRESSING REGULAR UPPER BODY CLOTHING: A LITTLE
MOVING TO AND FROM BED TO CHAIR: A LOT
WALKING IN HOSPITAL ROOM: A LOT
EATING MEALS: A LITTLE
TOILETING: A LOT
CLIMB 3 TO 5 STEPS WITH RAILING: A LOT
MOBILITY SCORE: 13
TOILETING: A LOT
DRESSING REGULAR LOWER BODY CLOTHING: A LOT
STANDING UP FROM CHAIR USING ARMS: A LOT
MOVING FROM LYING ON BACK TO SITTING ON SIDE OF FLAT BED: A LOT
PERSONAL GROOMING: A LITTLE
DAILY ACTIVITIY SCORE: 16
SUGGESTED CMS G CODE MODIFIER DAILY ACTIVITY: CK
DAILY ACTIVITIY SCORE: 15
DRESSING REGULAR UPPER BODY CLOTHING: A LITTLE
HELP NEEDED FOR BATHING: A LOT
SUGGESTED CMS G CODE MODIFIER DAILY ACTIVITY: CK
DRESSING REGULAR LOWER BODY CLOTHING: A LOT
PERSONAL GROOMING: A LITTLE
SUGGESTED CMS G CODE MODIFIER MOBILITY: CL
HELP NEEDED FOR BATHING: A LOT

## 2021-05-09 ASSESSMENT — PATIENT HEALTH QUESTIONNAIRE - PHQ9
6. FEELING BAD ABOUT YOURSELF - OR THAT YOU ARE A FAILURE OR HAVE LET YOURSELF OR YOUR FAMILY DOWN: NOT AL ALL
SUM OF ALL RESPONSES TO PHQ QUESTIONS 1-9: 0
SUM OF ALL RESPONSES TO PHQ9 QUESTIONS 1 AND 2: 0
2. FEELING DOWN, DEPRESSED, IRRITABLE, OR HOPELESS: NOT AT ALL
8. MOVING OR SPEAKING SO SLOWLY THAT OTHER PEOPLE COULD HAVE NOTICED. OR THE OPPOSITE, BEING SO FIGETY OR RESTLESS THAT YOU HAVE BEEN MOVING AROUND A LOT MORE THAN USUAL: NOT AT ALL
9. THOUGHTS THAT YOU WOULD BE BETTER OFF DEAD, OR OF HURTING YOURSELF: NOT AT ALL
5. POOR APPETITE OR OVEREATING: NOT AT ALL
3. TROUBLE FALLING OR STAYING ASLEEP OR SLEEPING TOO MUCH: NOT AT ALL
1. LITTLE INTEREST OR PLEASURE IN DOING THINGS: NOT AT ALL
7. TROUBLE CONCENTRATING ON THINGS, SUCH AS READING THE NEWSPAPER OR WATCHING TELEVISION: NOT AT ALL
4. FEELING TIRED OR HAVING LITTLE ENERGY: NOT AT ALL

## 2021-05-09 ASSESSMENT — ACTIVITIES OF DAILY LIVING (ADL): TOILETING: INDEPENDENT

## 2021-05-09 ASSESSMENT — LIFESTYLE VARIABLES
TOTAL SCORE: 0
AVERAGE NUMBER OF DAYS PER WEEK YOU HAVE A DRINK CONTAINING ALCOHOL: 0
HAVE YOU EVER FELT YOU SHOULD CUT DOWN ON YOUR DRINKING: NO
CONSUMPTION TOTAL: NEGATIVE
HOW MANY TIMES IN THE PAST YEAR HAVE YOU HAD 5 OR MORE DRINKS IN A DAY: 0
EVER HAD A DRINK FIRST THING IN THE MORNING TO STEADY YOUR NERVES TO GET RID OF A HANGOVER: NO
ALCOHOL_USE: NO
DOES PATIENT WANT TO STOP DRINKING: NO
EVER FELT BAD OR GUILTY ABOUT YOUR DRINKING: NO
ON A TYPICAL DAY WHEN YOU DRINK ALCOHOL HOW MANY DRINKS DO YOU HAVE: 0
TOTAL SCORE: 0
TOTAL SCORE: 0
HAVE PEOPLE ANNOYED YOU BY CRITICIZING YOUR DRINKING: NO

## 2021-05-09 ASSESSMENT — CHA2DS2 SCORE
DIABETES: NO
HYPERTENSION: NO
AGE 75 OR GREATER: NO
AGE 65 TO 74: NO
CHA2DS2 VASC SCORE: 1
CHF OR LEFT VENTRICULAR DYSFUNCTION: NO
PRIOR STROKE OR TIA OR THROMBOEMBOLISM: NO
VASCULAR DISEASE: NO
SEX: FEMALE

## 2021-05-09 ASSESSMENT — PAIN DESCRIPTION - PAIN TYPE
TYPE: ACUTE PAIN

## 2021-05-09 ASSESSMENT — FIBROSIS 4 INDEX: FIB4 SCORE: 0.6

## 2021-05-09 ASSESSMENT — GAIT ASSESSMENTS: DISTANCE (FEET): 3

## 2021-05-09 NOTE — CARE PLAN
Problem: Safety  Goal: Will remain free from injury  Outcome: PROGRESSING AS EXPECTED  Goal: Will remain free from falls  Outcome: PROGRESSING AS EXPECTED  Pt remains free from falls at this time. Safety precautions in place. Pt educated on calling for assistance when needed.       Problem: Pain Management  Goal: Pain level will decrease to patient's comfort goal  Outcome: PROGRESSING AS EXPECTED  Patient educated to pain scale system. Patient encouraged to verbalize discomfort. Patient taught about non-pharmacological pain management. Patient is comfortable at this time without statements of discomfort or pain.     Problem: Respiratory:  Goal: Respiratory status will improve  Outcome: PROGRESSING AS EXPECTED  Patient respiratory status assessed. Patient educated on importance of coughing and deep breathing. Deep breaths are encouraged. Educated on how ambulation and movement can affect respiratory status. Patient indicates understanding.

## 2021-05-09 NOTE — ED NOTES
Med Rec completed per patient   Allergies reviewed  No ORAL antibiotics in last 14 days    Patient is currently on warfarin. Per 5/5/21 anti coag note patient is suppose to be on warfarin 6 mg once daily. Patient reported this week she took 6 mg, 3 mg, 6 mg, 3 mg and then would go to 6 mg daily. (last dose taken was 3 mg)

## 2021-05-09 NOTE — ASSESSMENT & PLAN NOTE
5 minutes on tobacco cessation counseling provided including nicotine patches, gum, and dangers of smoking. Discussed the risks of smoking including increased risk of heart disease, stroke, cancer and COPD. Discussed the benefits of quitting smoking.

## 2021-05-09 NOTE — PROGRESS NOTES
2 RN Skin Check    2 RN skin check complete with Faiza RN   Devices in place: Nasal Cannula.  Skin assessed under devices: yes.  Confirmed pressure ulcers found on: N/A.  New potential pressure ulcers noted on N/A. Wound consult placed N/A.  The following interventions in place Pillows for support/positioning.    Patient's skin intact with exception to redness to bilateral lower legs + swelling and erythema.

## 2021-05-09 NOTE — ASSESSMENT & PLAN NOTE
Chronic  >500 upon this admission. Ranges from 200-500 on previous labs within past year.  Low suspicion for CHF  Last Echo performed 3/20/21: Prior echocardiogram 1/18/2021. No changes.  Normal left ventricular systolic function. Left ventricular ejection fraction is visually estimated to be 60%. The right ventricle was normal in size and function.  On lasix

## 2021-05-09 NOTE — PROGRESS NOTES
Patient transported from ED by ACLS nurse. Patient a&ox4. No signs of respiratory distress noted or reported, on 2L NC. Patient denies pain at this time. Tele box applied and monitors notified. Fall precautions in place. Bed locked in lowest position and upper rails up. Call light within reach. Patient denies additional needs at this time.

## 2021-05-09 NOTE — ED NOTES
Patient resting comfortably, breathing treatment in progress, O2 decreased to 2L/NC (patient baseline at home) and patient is tolerating well at this time. SpO2 100%

## 2021-05-09 NOTE — PROGRESS NOTES
Pt seen and examined by myself, admitted early am, see H and P for full details.    The patient is a 45-year-old female with a past medical history of COPD, schizophrenia, bipolar disorder, and morbid obesity who presents to the ED with a chief complaint of worsening shortness of breath.  D-dimer was not elevated on admission and patient without any other risk factors to develop acute pulmonary embolism.  NT proBNP not significantly elevated and patient without significant cardiac history placing acute CHF exacerbation on the differential.  Patient with low procalcitonin and without focal evidence of consolidation on chest imaging and she remains afebrile.  Low risk for community-acquired pneumonia.  Patient likely with COPD exacerbation.  She states that she is compliant with her home inhalers but she does continue to smoke which likely triggered this exacerbation.  She is currently back to her baseline home oxygen levels and remains comfortable.  All labs and images reviewed.  She will continue current management for COPD exacerbation.

## 2021-05-09 NOTE — ASSESSMENT & PLAN NOTE
Multifactorial, likely sec 2/2 copd and obesity hypoventilation syndrome, KATHIA  's  Dimer negative  Obtain procal neg  Cont duonebs, home inhalers, and steroids and O2 support  Lasix  IS

## 2021-05-09 NOTE — PROGRESS NOTES
Inpatient Anticoagulation Service Note    Date: 5/9/2021    Reason for Anticoagulation: Atrial Fibrillation   Target INR: 2.0 to 3.0  [id number] VASc Score: 1  HAS-BLED Score: 1   Hemoglobin Value: (!) 10.8  Hematocrit Value: (!) 36.6  Lab Platelet Value: 228    INR from last 7 days     Date/Time INR Value    05/08/21 2153  (!) 1.62        Dose from last 7 days     Date/Time Dose (mg)    05/09/21 0158  6        Average Dose (mg): 6  Significant Interactions: Statin (If less than 5 days and overlap therapy discontinued -- document reason (i.e. Bleed Risk))    (If still on overlap therapy, if No -- document reason (i.e. Bleed Risk))    Reversal Agent Administered: Not Applicable  Comments: On warfarin for a fib. Patient currently subtherapeutic after alternating 3mg and 6mg doses. No signs/sx of bleed    Plan:  Continue prescribed home regimen of 6mg daily. Followup INR scheduled for 5/10  Education Material Provided?: No  Pharmacist suggested discharge dosing: Pending repeat INR, may consider 6mg daily     Sylwia Gaines, PharmD

## 2021-05-09 NOTE — PROGRESS NOTES
Assumed care of pt. Bedside report received from night shift RNCarrol. Pt aaox4, on 2L and no signs of distress noted at this time. Tele box is on and rhythm verifed. POC discussed with pt and pt verbalized no questions at this time. Bed low and locked. Call light and personal belongings within reach. All needs met at this time. Will continue POC

## 2021-05-09 NOTE — ASSESSMENT & PLAN NOTE
On coumadin, will continue with Coumadin. I coordinate with pharmacy for coumadin dosing. INR target 2-3. Closely monitor any sign of bleeding.  Presumed inr goal of 2-3.  Daily INRs

## 2021-05-09 NOTE — ASSESSMENT & PLAN NOTE
Continue to  on lifestyle modifications to include diet and exercise. Outpatient bariatric surgery evaluation recommended

## 2021-05-09 NOTE — ED NOTES
Patient arrived via EMS on 4L/NC for SOB at rest.  Patient placed in room GRN24, vitals taken, urine collected, EKG completed, and patient placed on monitor.

## 2021-05-09 NOTE — H&P
Hospital Medicine History & Physical Note    Date of Service  5/9/2021    Primary Care Physician  SHANKAR Gleason    Consultants  None     Code Status  Full Code    Chief Complaint  Chief Complaint   Patient presents with   • Weakness       History of Presenting Illness  45F with COPD, Schizophrenia BIBEMS for SOB x 2 days with minimal alleviation from her inhaler. On 2L NC at home, she also notes yellow productive cough coinsiding with sob onset. She endorses left sided chest pain, non-radiating x 1 day. Patient states she takes Warfarin for blood clots, although EMR shows she has history of paroxysmal afib. She has had 1 dose of covid-19 vacine and scheduled for second dose next week. She denies any sick contacts. Otherwise, she denies extremity swelling, diaphoresis, dizziness, weakness, unintentional weight changes, fever, chills, nausea, vomiting, hemoptysis, diarrhea, constipation, headaches, dysuria/dyspareunia, polyuria, or polydipsia. Denies recent history of Malignancy, drug, alcohol, or cannabinoid use. She states she smokes 1 cigarette per day.    Last Echo performed 3/20/21: Prior echocardiogram 1/18/2021. No changes.  Normal left ventricular systolic function. Left ventricular ejection fraction is visually estimated to be 60%. The right ventricle was normal in size and function.      Review of Systems  ROS  10+ systems reviewed and negative except as noted in HPI.    Past Medical History   has a past medical history of Asthma, Bipolar 2 disorder (HCC), Chickenpox, Chronic obstructive pulmonary disease (HCC), Hypertension, Other psychological stress, Schizophrenic disorder (HCC), and Yeast infection of the skin (4/1/2021).    Surgical History   has a past surgical history that includes hysterectomy laparoscopy; colectomy; cholecystectomy; and knee arthroscopy (Left).     Family History  family history includes Cancer in her sister; No Known Problems in her mother.     Social History   reports that  she quit smoking about 4 months ago. Her smoking use included cigarettes. She has a 20.00 pack-year smoking history. She has never used smokeless tobacco. She reports that she does not drink alcohol and does not use drugs.    Allergies  Allergies   Allergen Reactions   • Amoxicillin Rash and Itching     Tolerates cephalosporins, pt reports that she gets a rash all over her body and gets itchy   • Penicillin G Rash and Itching     pt reports that she gets a rash all over her body and gets itchy       Medications  Prior to Admission Medications   Prescriptions Last Dose Informant Patient Reported? Taking?   acetaminophen (TYLENOL) 500 MG Tab  Patient Yes No   Sig: Take 1,000 mg by mouth one time.   albuterol 108 (90 Base) MCG/ACT Aero Soln inhalation aerosol  Patient No No   Sig: Inhale 2 Puffs every 6 hours as needed for Shortness of Breath.   aripiprazole (ABILIFY) 30 MG tablet  Patient Yes No   Sig: Take 30 mg by mouth every morning.   atorvastatin (LIPITOR) 20 MG Tab  Patient No No   Sig: Take 1 Tab by mouth every day.   Patient not taking: Reported on 4/14/2021   cephALEXin (KEFLEX) 500 MG Cap   No No   Sig: Take 1 capsule by mouth 4 times a day for 7 days.   fluticasone (FLONASE) 50 MCG/ACT nasal spray  Patient Yes No   Sig: Administer 1 Spray into affected nostril(S) 2 times a day.   furosemide (LASIX) 20 MG Tab  Patient No No   Sig: Take 1 tablet by mouth 2 times a day.   lithium CR (LITHOBID) 300 MG Tab CR  Patient Yes No   Sig: Take 300-600 mg by mouth 2 Times a Day. 1 caps = 300 mg every morning  2 caps = 600 mg every evening   metoprolol SR (TOPROL XL) 25 MG TABLET SR 24 HR   No No   Sig: Take 1 tablet by mouth every day.   montelukast (SINGULAIR) 10 MG Tab  Patient No No   Sig: Take 1 Tab by mouth every bedtime.   nystatin (MYCOSTATIN) powder  Patient No No   Sig: Apply underneath breasts and on inner thigh area twice a day for fungal infection   Patient taking differently: Apply 1 Application topically 2  times a day as needed (fungal infection).   potassium chloride SA (KDUR) 20 MEQ Tab CR  Patient No No   Sig: Take 1 tablet by mouth every day.   tiotropium (SPIRIVA HANDIHALER) 18 MCG Cap  Patient No No   Sig: Inhale 1 Cap by mouth every day.   warfarin (COUMADIN) 6 MG Tab  Patient No No   Sig: Take 1 tablet by mouth daily or as directed by anticoagulation clinic      Facility-Administered Medications: None       Physical Exam  Temp:  [36.4 °C (97.6 °F)-36.7 °C (98.1 °F)] 36.6 °C (97.9 °F)  Pulse:  [74-80] 78  Resp:  [20-35] 20  BP: (111-135)/(50-72) 126/57  SpO2:  [91 %-100 %] 95 %    Physical Exam  Physical Exam  Vitals and nursing note reviewed.  Constitutional:     General: Alert in no acute distress.    Appearance: Pt is not ill-appearing.     Comments:   HENT: No signs of trauma, Bilateral external ears normal, Nose normal.      Head: Normocephalic.     Mouth/Throat: Unremarkable. Moist Mucosa     Comments:   Eyes:      Pupils: Pupils are equal round and reactive to light, Conjunctiva normal, Non-icteric.   Neck: Normal range of motion, No tenderness, Supple, No stridor.   Cardiovascular:      Rate and Rhythm: Regular Rate and Rhythm     Heart sounds: No murmur, rubs, or gallops appreciated.  Pulmonary/Thorax & Lungs:      Effort: No respiratory distress.     Breath sounds: No stridor. No wheezing, No Rhonchi, no palpable chest tenderness     Comments:    Abdomen:     General: There is no distension.      Palpation/Auscultation: Soft, No tenderness, No masses, No pulsatile masses. Bowel sounds normal. No rebound/peritoneal signs. Negative Mullins sign.     Hernia: No hernia is appreciated at this time.   Skin: Warm, Dry, No erythema, No rash.       Coloration: Skin is not jaundiced or pale.   Back: No bony tenderness, No CVA tenderness.   Musculoskeletal:         General: No swelling or tenderness.   Extremities:       General: Intact distal pulses, No edema, No tenderness, No cyanosis      Vascular:    Neurologic/Psych: Alert, No focal deficits noted.       Comments:           Laboratory:  Recent Labs     05/08/21  2320   WBC 11.1*   RBC 3.90*   HEMOGLOBIN 10.8*   HEMATOCRIT 36.6*   MCV 93.8   MCH 27.7   MCHC 29.5*   RDW 58.4*   PLATELETCT 228   MPV 11.3     Recent Labs     05/08/21 2153   SODIUM 141   POTASSIUM 4.2   CHLORIDE 104   CO2 32   GLUCOSE 89   BUN 9   CREATININE 0.96   CALCIUM 8.9     Recent Labs     05/08/21 2153   ALTSGPT 20   ASTSGOT 13   ALKPHOSPHAT 105*   TBILIRUBIN 0.5   GLUCOSE 89     Recent Labs     05/08/21 2153 05/09/21  0309   INR 1.62* 1.64*     Recent Labs     05/08/21 2153 05/09/21  0309   NTPROBNP 532* 457*         Recent Labs     05/08/21 2153 05/09/21  0015   TROPONINT 21* 18       Imaging:  DX-CHEST-PORTABLE (1 VIEW)   Final Result         1. No acute cardiopulmonary abnormalities are identified.            Assessment/Plan:  I anticipate this patient will require at least two midnights for appropriate medical management, necessitating inpatient admission.    Acute on chronic respiratory failure (HCC)- (present on admission)  Assessment & Plan  2/2 copd vs. chf vs. pna  's  Dimer negative  Obtain procal  Mild leukocytosis with left shift, consider soft sepsis workup, although most likely reactive, pending procal level.  Most likely copd exacerbation: duonebs, home inhalers, and solumedrol 62.5mb BID ordered, taper steroids and O2 support commensurate with recovery.    KATHIA and COPD overlap syndrome (HCC)- (present on admission)  Assessment & Plan  With COPD exacerbation this admission  Continue home inhalers  Pulse ox and oxygen support, 3L required in ED.  Maintain >93%  Smoking cessation counseling 12 minutes  Consider nightly cpap  Most likely copd exacerbation: duonebs, home inhalers, and solumedrol 62.5mb BID ordered, taper steroids and O2 support commensurate with recovery.        Yeast infection of the skin- (present on admission)  Assessment & Plan  Continue  nystatin to intertriginous areas including under breasts and crux    Paroxysmal atrial fibrillation (HCC)- (present on admission)  Assessment & Plan  Continue warfarin per pharmacy, outside dosing lists nightly dose at 6mg qhs  Presumed inr goal of 2-3.  Daily INRs: 1.62 upon admission.    Elevated brain natriuretic peptide (BNP) level- (present on admission)  Assessment & Plan  Chronic  >500 upon this admission. Ranges from 200-500 on previous labs within past year.  Low suspicion for CHF  Last Echo performed 3/20/21: Prior echocardiogram 1/18/2021. No changes.  Normal left ventricular systolic function. Left ventricular ejection fraction is visually estimated to be 60%. The right ventricle was normal in size and function.    Chest pain, rule out acute myocardial infarction- (present on admission)  Assessment & Plan  Trop initially negative  EKG not impressive  Low suspicion of MI  Trend trop  Repeat ekg in day    Tobacco use- (present on admission)  Assessment & Plan  Smoking cessation counseling for 12 minutes performed  Nicotine patch upon request    Schizophrenia (HCC)  Assessment & Plan  Lithium level in AM  Cont. 300mg in AM, 600mg qhs unless levels high  Cont. abilify 30mg qam    Leukocytosis- (present on admission)  Assessment & Plan  Mild  Likely reactive  procal ordered  UA ordered  Watch off antibiotics except for Azithromycin for now and trend cbc    Iron deficiency anemia- (present on admission)  Assessment & Plan  Iron studies and ferriting

## 2021-05-09 NOTE — ASSESSMENT & PLAN NOTE
With COPD exacerbation this admission  Continue home inhalers  Pulse ox and oxygen support, 3L required in ED.  Maintain >93%  Smoking cessation counseling 12 minutes  Consider nightly cpap  Most likely copd exacerbation: duonebs, home inhalers, and solumedrol 62.5mb BID ordered, taper steroids and O2 support commensurate with recovery.

## 2021-05-09 NOTE — ED PROVIDER NOTES
ED Provider Note    Scribed for José Miguel Stanton M.D. by Ryan Flores. 5/8/2021, 8:35 PM.    Primary care provider: SHANKAR Gleason  Means of arrival: EMS  History obtained from: Patient  History limited by: None    CHIEF COMPLAINT  Chief Complaint   Patient presents with    Weakness      HPI  Lorene Haro is a 45 y.o. female with a history of COPD and schizophrenia who presents to the Emergency Department BIB EMS for shortness of breath onset two days ago. She has been using her inhaler with minimal alleviation of her shortness of breath. She is normally on 2L NC. She additionally notes a productive cough with yellow phlegm which began occurring around the same time as the onset of her shortness of breath. She also reports some sharp, left-sided chest pain which does not radiate which has been ongoing since this morning. She does not report a history of MI but takes Warfarin for blood clots, and she states her last INR taken two weeks ago was high. She endorses a history of smoking and admits to smoking one cigarette today. She reports additional symptmos of nausea but denies any fever, chills, vomiting, or diarrhea. She denies any sick contact. She has had her first dose of a COVID-19 vaccine and is scheduled for the second dose next week.    PPE Note: I personally donned full PPE for all patient encounters during this visit, including being clean-shaven with an N95 respirator mask, gloves, and goggles.     Scribe remained outside the patient's room and did not have any contact with the patient for the duration of patient encounter.      REVIEW OF SYSTEMS  Pertinent positives include shortness of breath, chest pain, nausea, and productive cough. Pertinent negatives include vomiting, diarrhea, chills, or fever. All other systems negative.    PAST MEDICAL HISTORY   has a past medical history of Asthma, Bipolar 2 disorder (HCC), Chickenpox, Chronic obstructive pulmonary disease (HCC), Hypertension,  Other psychological stress, Schizophrenic disorder (HCC), and Yeast infection of the skin (2021).    SURGICAL HISTORY   has a past surgical history that includes hysterectomy laparoscopy; colectomy; cholecystectomy; and knee arthroscopy (Left).    SOCIAL HISTORY  Social History     Tobacco Use    Smoking status: Former Smoker     Packs/day: 1.00     Years: 20.00     Pack years: 20.00     Types: Cigarettes     Quit date: 2020     Years since quittin.3    Smokeless tobacco: Never Used    Tobacco comment: Patient states she quit two days ago    Substance Use Topics    Alcohol use: No    Drug use: No      Social History     Substance and Sexual Activity   Drug Use No       FAMILY HISTORY  Family History   Problem Relation Age of Onset    No Known Problems Mother     Cancer Sister        CURRENT MEDICATIONS  Current Outpatient Medications   Medication Instructions    acetaminophen (TYLENOL) 1,000 mg, Oral, ONCE    albuterol 108 (90 Base) MCG/ACT Aero Soln inhalation aerosol 2 Puffs, Inhalation, EVERY 6 HOURS PRN    aripiprazole (ABILIFY) 30 mg, Oral, EVERY MORNING    atorvastatin (LIPITOR) 20 mg, Oral, DAILY    cephALEXin (KEFLEX) 500 mg, Oral, 4 TIMES DAILY    fluticasone (FLONASE) 50 MCG/ACT nasal spray 1 Spray, Nasal, 2 TIMES DAILY    furosemide (LASIX) 20 mg, Oral, 2 TIMES DAILY    lithium carbonate -600 mg, Oral, 2 TIMES DAILY, 1 caps = 300 mg every morning<BR>2 caps = 600 mg every evening    metoprolol SR (TOPROL XL) 25 mg, Oral, DAILY    montelukast (SINGULAIR) 10 mg, Oral, EVERY BEDTIME    nystatin (MYCOSTATIN) powder Apply underneath breasts and on inner thigh area twice a day for fungal infection    potassium chloride SA (KDUR) 20 MEQ Tab CR 20 mEq, Oral, DAILY    tiotropium (SPIRIVA HANDIHALER) 18 MCG Cap 1 capsule, Inhalation, DAILY    warfarin (COUMADIN) 6 MG Tab Take 1 tablet by mouth daily or as directed by anticoagulation clinic          ALLERGIES  Allergies   Allergen Reactions     Amoxicillin Rash and Itching     Tolerates cephalosporins, pt reports that she gets a rash all over her body and gets itchy    Penicillin G Rash and Itching     pt reports that she gets a rash all over her body and gets itchy       PHYSICAL EXAM  VITAL SIGNS: /72   Pulse 76   Temp 36.4 °C (97.6 °F) (Temporal)   Resp (!) 22   LMP  (LMP Unknown)   SpO2 100%     Constitutional: Well developed, Well nourished, Moderate distress.   HENT: Normocephalic, Atraumatic, mask in place.  Eyes: Conjunctiva normal, No discharge.   Neck: Supple, No stridor   Cardiovascular: Normal heart rate, Normal rhythm, No murmurs, equal pulses. Chest pain not reproducible with palpation of the left chest.  Pulmonary: Tachypneic, Wheezes in bilateral lung fields, No respiratory distress, No rales, No rhonchi.  Chest: No chest wall tenderness or deformity.   Abdomen: Obese abdomen, Soft, No tenderness, No masses, no rebound, no guarding.   Back: No CVA tenderness.   Musculoskeletal: Trace bilateral edema to mid bilateral shins with chronic stasis changes. No major deformities noted, No tenderness.   Skin: Warm, Dry, No erythema, No rash.   Neurologic: Alert & oriented x 3, Normal motor function,  No focal deficits noted.   Psychiatric: Affect normal, Judgment normal, Mood normal.     LABS  Results for orders placed or performed during the hospital encounter of 05/08/21   Complete Metabolic Panel (CMP)   Result Value Ref Range    Sodium 141 135 - 145 mmol/L    Potassium 4.2 3.6 - 5.5 mmol/L    Chloride 104 96 - 112 mmol/L    Co2 32 20 - 33 mmol/L    Anion Gap 5.0 (L) 7.0 - 16.0    Glucose 89 65 - 99 mg/dL    Bun 9 8 - 22 mg/dL    Creatinine 0.96 0.50 - 1.40 mg/dL    Calcium 8.9 8.5 - 10.5 mg/dL    AST(SGOT) 13 12 - 45 U/L    ALT(SGPT) 20 2 - 50 U/L    Alkaline Phosphatase 105 (H) 30 - 99 U/L    Total Bilirubin 0.5 0.1 - 1.5 mg/dL    Albumin 3.7 3.2 - 4.9 g/dL    Total Protein 6.6 6.0 - 8.2 g/dL    Globulin 2.9 1.9 - 3.5 g/dL    A-G  Ratio 1.3 g/dL   proBrain Natriuretic Peptide, NT   Result Value Ref Range    NT-proBNP 532 (H) 0 - 125 pg/mL   Troponin STAT   Result Value Ref Range    Troponin T 21 (H) 6 - 19 ng/L   COV-2, FLU A/B, AND RSV BY PCR (2-4 HOURS CEPHEID): Collect NP swab in VTM    Specimen: Respirate   Result Value Ref Range    Influenza virus A RNA Negative Negative    Influenza virus B, PCR Negative Negative    RSV, PCR Negative Negative    SARS-CoV-2 by PCR NotDetected     SARS-CoV-2 Source NP Swab    PT/INR   Result Value Ref Range    PT 19.7 (H) 12.0 - 14.6 sec    INR 1.62 (H) 0.87 - 1.13   ESTIMATED GFR   Result Value Ref Range    GFR If African American >60 >60 mL/min/1.73 m 2    GFR If Non African American >60 >60 mL/min/1.73 m 2   TROPONIN   Result Value Ref Range    Troponin T 18 6 - 19 ng/L   CBC WITH DIFFERENTIAL   Result Value Ref Range    WBC 11.1 (H) 4.8 - 10.8 K/uL    RBC 3.90 (L) 4.20 - 5.40 M/uL    Hemoglobin 10.8 (L) 12.0 - 16.0 g/dL    Hematocrit 36.6 (L) 37.0 - 47.0 %    MCV 93.8 81.4 - 97.8 fL    MCH 27.7 27.0 - 33.0 pg    MCHC 29.5 (L) 33.6 - 35.0 g/dL    RDW 58.4 (H) 35.9 - 50.0 fL    Platelet Count 228 164 - 446 K/uL    MPV 11.3 9.0 - 12.9 fL    Neutrophils-Polys 90.60 (H) 44.00 - 72.00 %    Lymphocytes 9.40 (L) 22.00 - 41.00 %    Monocytes 0.00 0.00 - 13.40 %    Eosinophils 0.00 0.00 - 6.90 %    Basophils 0.00 0.00 - 1.80 %    Nucleated RBC 0.00 /100 WBC    Neutrophils (Absolute) 10.06 (H) 2.00 - 7.15 K/uL    Lymphs (Absolute) 1.04 1.00 - 4.80 K/uL    Monos (Absolute) 0.00 0.00 - 0.85 K/uL    Eos (Absolute) 0.00 0.00 - 0.51 K/uL    Baso (Absolute) 0.00 0.00 - 0.12 K/uL    NRBC (Absolute) 0.00 K/uL    Anisocytosis 1+     Macrocytosis 1+     Microcytosis 1+    DIFFERENTIAL MANUAL   Result Value Ref Range    Manual Diff Status PERFORMED    PERIPHERAL SMEAR REVIEW   Result Value Ref Range    Peripheral Smear Review see below    PLATELET ESTIMATE   Result Value Ref Range    Plt Estimation Normal    MORPHOLOGY    Result Value Ref Range    RBC Morphology Present     Polychromia 1+    LACTIC ACID   Result Value Ref Range    Lactic Acid 2.9 (H) 0.5 - 2.0 mmol/L   EKG   Result Value Ref Range    Report       [facility] Emergency Dept.    Test Date:  2021  Pt Name:    ADELINA MILLAN                Department: ER  MRN:        [id number]                      Room:       [facility]  Gender:     Female                       Technician: [id number]  :        [id number]                   Requested By:JORDON SWIFT  Order #:    [id number]                    Reading MD: JORDON SWIFT MD    Measurements  Intervals                                Axis  Rate:       79                           P:          78  SD:         156                          QRS:        58  QRSD:       94                           T:          -10  QT:         360  QTc:        413    Interpretive Statements  SINUS RHYTHM, rate of 79,  ATRIAL PREMATURE COMPLEX  ABERRANT COMPLEX, POSSIBLY SUPRAVENTRICULAR  BORDERLINE T ABNORMALITIES, INFERIOR LEADS  Compared to ECG 2021 23:58:24  Atrial premature complex(es) now present  Aberrant conduction of supraventricular beat(s) now present  T-wave abnormali ty now present  Electronically Signed On 2021 0:52:36 PDT by JORDON SWIFT MD     POCT glucose device results   Result Value Ref Range    Glucose - Accu-Ck 212 (H) 65 - 99 mg/dL      All labs reviewed by me.    EKG  12 Lead EKG interpreted by me as noted above.    RADIOLOGY  DX-CHEST-PORTABLE (1 VIEW)   Final Result         1. No acute cardiopulmonary abnormalities are identified.        The radiologist's interpretation of all radiological studies have been reviewed by me.    COURSE & MEDICAL DECISION MAKING  Pertinent Labs & Imaging studies reviewed. (See chart for details)    Patient's medical records reviewed which show the patient was last seen in the ED on 21 for chest pain. She had a negative D-dimer and troponin in the  ED. She also experienced chest pain in January 2021 and was admitted for ACS rule out. She had a stress test and echocardiogram which showed an EF of 60% and had a negative cardiac workup. She has presented to the ED multiple times in the past few months for COPD exacerbation.    8:35 PM - Patient seen and examined at bedside. I informed her she would be evaluated with a chest x-ray to evaluate her symptoms and receive a breathing treatment to alleviate her shortness of breath. Patient will be treated with albuterol 2.5 mg nebulizer solution and Solu-Medrol 125 mg injection. Ordered DX-Chest, PT/INR, pBNP NT, Troponin, COV-2, Flu A/B and RSV by PCR, CBC w/ diff, CMP, and EKG to evaluate her symptoms. The differential diagnoses include but are not limited to: COPD exacerbation, MI, CHF, pneumonia.    10:24 PM - Patient was reevaluated at bedside. Discussed lab and radiology results with the patient and informed them that there are no signs of pneumonia on her chest x-ray. She is feeling significantly better after her first breathing treatment. On reevaluation she has better air movement but still has slight wheezing. Ordered additional albuterol 2.5 mg nebulizer.    11:01 PM - I rechecked the patient at bedside and informed her I would order IV Lasix to help manage her shortness of breath. Ordered Lasix 40 mg injection.    12:44 AM - Paged Hospitalist.    1:02 AM I discussed the patient's case and the above findings with Dr. Sousa (Hospiatlist) who agreed to evaluate the patient for hospitalization.     Medical Decision Making: Patient presents with shortness of breath at this point time I think it is likely secondary to a COPD exacerbation with wheezing and albuterol inhalers.  Patient does also have an elevated proBNP I think this is also contributing to his shortness of breath.  X-ray does not show any signs of pneumonia.  Covid testing negative.  I think the patient would benefit from continued breathing  treatments and diuresis.      DISPOSITION:  Patient will be hospitalized by Dr. Sousa in guarded condition.     FINAL IMPRESSION  1. Acute exacerbation of chronic obstructive pulmonary disease (COPD) (HCC)    2. Chronic obstructive pulmonary disease, unspecified COPD type (HCC)    3. Chest pain, rule out acute myocardial infarction    4. Schizophrenia, unspecified type (HCC)    5. Elevated brain natriuretic peptide (BNP) level    6. Paroxysmal atrial fibrillation (HCC)    7. Blood glucose elevated    8. Yeast infection of the skin       Ryan HARRIS (Ade), am scribing for, and in the presence of, José Miguel Stanton M.D.    Electronically signed by: Ryan Flores (Ade), 5/8/2021    IJosé Migule M.D. personally performed the services described in this documentation, as scribed by Ryan Flores in my presence, and it is both accurate and complete.    C.    The note accurately reflects work and decisions made by me.  José Miguel Stanton M.D.  5/9/2021  3:43 AM

## 2021-05-09 NOTE — CARE PLAN
Problem: Communication  Goal: The ability to communicate needs accurately and effectively will improve  Outcome: PROGRESSING AS EXPECTED  Patient updated on plan of care and is agreeable. Encouraged patient to voice feelings and ask questions.      Problem: Infection  Goal: Will remain free from infection  Outcome: PROGRESSING AS EXPECTED  Standard precautions in place. Hand hygiene performed before and after patient contact. Pt educated on importance of hand hygiene and infection prevention.

## 2021-05-09 NOTE — ED NOTES
Patient purewick dislodged, 2 person assist to assist patient with linen change and replacement of purewick.

## 2021-05-10 LAB
ANION GAP SERPL CALC-SCNC: 4 MMOL/L (ref 7–16)
BUN SERPL-MCNC: 17 MG/DL (ref 8–22)
CALCIUM SERPL-MCNC: 9.6 MG/DL (ref 8.5–10.5)
CHLORIDE SERPL-SCNC: 100 MMOL/L (ref 96–112)
CHOLEST SERPL-MCNC: 198 MG/DL (ref 100–199)
CO2 SERPL-SCNC: 32 MMOL/L (ref 20–33)
CREAT SERPL-MCNC: 0.96 MG/DL (ref 0.5–1.4)
EKG IMPRESSION: NORMAL
ERYTHROCYTE [DISTWIDTH] IN BLOOD BY AUTOMATED COUNT: 56.5 FL (ref 35.9–50)
GLUCOSE BLD-MCNC: 157 MG/DL (ref 65–99)
GLUCOSE BLD-MCNC: 179 MG/DL (ref 65–99)
GLUCOSE BLD-MCNC: 181 MG/DL (ref 65–99)
GLUCOSE BLD-MCNC: 181 MG/DL (ref 65–99)
GLUCOSE SERPL-MCNC: 169 MG/DL (ref 65–99)
HCT VFR BLD AUTO: 36.9 % (ref 37–47)
HDLC SERPL-MCNC: 48 MG/DL
HGB BLD-MCNC: 11.1 G/DL (ref 12–16)
INR BLD: 2.5 (ref 0.9–1.2)
INR PPP: 1.39 (ref 0.87–1.13)
LDLC SERPL CALC-MCNC: 125 MG/DL
MAGNESIUM SERPL-MCNC: 2.4 MG/DL (ref 1.5–2.5)
MCH RBC QN AUTO: 27.9 PG (ref 27–33)
MCHC RBC AUTO-ENTMCNC: 30.1 G/DL (ref 33.6–35)
MCV RBC AUTO: 92.7 FL (ref 81.4–97.8)
PHOSPHATE SERPL-MCNC: 2.1 MG/DL (ref 2.5–4.5)
PLATELET # BLD AUTO: 279 K/UL (ref 164–446)
PMV BLD AUTO: 11.2 FL (ref 9–12.9)
POTASSIUM SERPL-SCNC: 4.4 MMOL/L (ref 3.6–5.5)
PROTHROMBIN TIME: 17.4 SEC (ref 12–14.6)
RBC # BLD AUTO: 3.98 M/UL (ref 4.2–5.4)
SODIUM SERPL-SCNC: 136 MMOL/L (ref 135–145)
TRIGL SERPL-MCNC: 126 MG/DL (ref 0–149)
WBC # BLD AUTO: 13.2 K/UL (ref 4.8–10.8)

## 2021-05-10 PROCEDURE — 700102 HCHG RX REV CODE 250 W/ 637 OVERRIDE(OP): Performed by: STUDENT IN AN ORGANIZED HEALTH CARE EDUCATION/TRAINING PROGRAM

## 2021-05-10 PROCEDURE — G0378 HOSPITAL OBSERVATION PER HR: HCPCS

## 2021-05-10 PROCEDURE — 82962 GLUCOSE BLOOD TEST: CPT | Mod: 91

## 2021-05-10 PROCEDURE — 93010 ELECTROCARDIOGRAM REPORT: CPT | Performed by: INTERNAL MEDICINE

## 2021-05-10 PROCEDURE — 94664 DEMO&/EVAL PT USE INHALER: CPT

## 2021-05-10 PROCEDURE — 96376 TX/PRO/DX INJ SAME DRUG ADON: CPT

## 2021-05-10 PROCEDURE — 36415 COLL VENOUS BLD VENIPUNCTURE: CPT

## 2021-05-10 PROCEDURE — 700111 HCHG RX REV CODE 636 W/ 250 OVERRIDE (IP): Performed by: STUDENT IN AN ORGANIZED HEALTH CARE EDUCATION/TRAINING PROGRAM

## 2021-05-10 PROCEDURE — 84100 ASSAY OF PHOSPHORUS: CPT

## 2021-05-10 PROCEDURE — 96372 THER/PROPH/DIAG INJ SC/IM: CPT | Mod: XU

## 2021-05-10 PROCEDURE — 85027 COMPLETE CBC AUTOMATED: CPT

## 2021-05-10 PROCEDURE — 80061 LIPID PANEL: CPT

## 2021-05-10 PROCEDURE — A9270 NON-COVERED ITEM OR SERVICE: HCPCS | Performed by: STUDENT IN AN ORGANIZED HEALTH CARE EDUCATION/TRAINING PROGRAM

## 2021-05-10 PROCEDURE — 96365 THER/PROPH/DIAG IV INF INIT: CPT

## 2021-05-10 PROCEDURE — 700111 HCHG RX REV CODE 636 W/ 250 OVERRIDE (IP): Performed by: INTERNAL MEDICINE

## 2021-05-10 PROCEDURE — 80048 BASIC METABOLIC PNL TOTAL CA: CPT

## 2021-05-10 PROCEDURE — 94760 N-INVAS EAR/PLS OXIMETRY 1: CPT

## 2021-05-10 PROCEDURE — 97161 PT EVAL LOW COMPLEX 20 MIN: CPT

## 2021-05-10 PROCEDURE — 85610 PROTHROMBIN TIME: CPT

## 2021-05-10 PROCEDURE — 99225 PR SUBSEQUENT OBSERVATION CARE,LEVEL II: CPT | Performed by: INTERNAL MEDICINE

## 2021-05-10 PROCEDURE — 94660 CPAP INITIATION&MGMT: CPT

## 2021-05-10 PROCEDURE — 83735 ASSAY OF MAGNESIUM: CPT

## 2021-05-10 PROCEDURE — 87040 BLOOD CULTURE FOR BACTERIA: CPT | Mod: 91

## 2021-05-10 PROCEDURE — 93005 ELECTROCARDIOGRAM TRACING: CPT | Performed by: STUDENT IN AN ORGANIZED HEALTH CARE EDUCATION/TRAINING PROGRAM

## 2021-05-10 PROCEDURE — 700105 HCHG RX REV CODE 258: Performed by: INTERNAL MEDICINE

## 2021-05-10 RX ORDER — PREDNISONE 20 MG/1
40 TABLET ORAL DAILY
Status: DISCONTINUED | OUTPATIENT
Start: 2021-05-10 | End: 2021-05-11

## 2021-05-10 RX ADMIN — FUROSEMIDE 20 MG: 40 TABLET ORAL at 17:33

## 2021-05-10 RX ADMIN — INSULIN HUMAN 1 UNITS: 100 INJECTION, SOLUTION PARENTERAL at 06:27

## 2021-05-10 RX ADMIN — ATORVASTATIN CALCIUM 20 MG: 20 TABLET, FILM COATED ORAL at 17:33

## 2021-05-10 RX ADMIN — PREDNISONE 40 MG: 20 TABLET ORAL at 13:07

## 2021-05-10 RX ADMIN — INSULIN HUMAN 1 UNITS: 100 INJECTION, SOLUTION PARENTERAL at 13:09

## 2021-05-10 RX ADMIN — MONTELUKAST 10 MG: 10 TABLET, FILM COATED ORAL at 19:51

## 2021-05-10 RX ADMIN — LITHIUM CARBONATE 600 MG: 300 TABLET, FILM COATED, EXTENDED RELEASE ORAL at 17:33

## 2021-05-10 RX ADMIN — SODIUM CHLORIDE 125 MG: 9 INJECTION, SOLUTION INTRAVENOUS at 17:42

## 2021-05-10 RX ADMIN — DOCUSATE SODIUM 50 MG AND SENNOSIDES 8.6 MG 2 TABLET: 8.6; 5 TABLET, FILM COATED ORAL at 06:15

## 2021-05-10 RX ADMIN — LITHIUM CARBONATE 300 MG: 300 TABLET, FILM COATED, EXTENDED RELEASE ORAL at 06:16

## 2021-05-10 RX ADMIN — TIOTROPIUM BROMIDE INHALATION SPRAY 5 MCG: 3.12 SPRAY, METERED RESPIRATORY (INHALATION) at 08:29

## 2021-05-10 RX ADMIN — POTASSIUM CHLORIDE 20 MEQ: 1500 TABLET, EXTENDED RELEASE ORAL at 06:15

## 2021-05-10 RX ADMIN — ARIPIPRAZOLE 30 MG: 10 TABLET ORAL at 06:15

## 2021-05-10 RX ADMIN — FLUTICASONE PROPIONATE 50 MCG: 50 SPRAY, METERED NASAL at 13:07

## 2021-05-10 RX ADMIN — AZITHROMYCIN MONOHYDRATE 500 MG: 250 TABLET ORAL at 06:15

## 2021-05-10 RX ADMIN — DOCUSATE SODIUM 50 MG AND SENNOSIDES 8.6 MG 2 TABLET: 8.6; 5 TABLET, FILM COATED ORAL at 17:33

## 2021-05-10 RX ADMIN — NYSTATIN 100000 UNITS: 100000 POWDER TOPICAL at 17:45

## 2021-05-10 RX ADMIN — METOPROLOL SUCCINATE 25 MG: 25 TABLET, EXTENDED RELEASE ORAL at 06:15

## 2021-05-10 RX ADMIN — NYSTATIN 100000 UNITS: 100000 POWDER TOPICAL at 06:16

## 2021-05-10 RX ADMIN — METHYLPREDNISOLONE SODIUM SUCCINATE 62.5 MG: 125 INJECTION, POWDER, FOR SOLUTION INTRAMUSCULAR; INTRAVENOUS at 06:16

## 2021-05-10 RX ADMIN — FUROSEMIDE 20 MG: 40 TABLET ORAL at 06:15

## 2021-05-10 RX ADMIN — ACETAMINOPHEN 650 MG: 325 TABLET, FILM COATED ORAL at 16:31

## 2021-05-10 RX ADMIN — INSULIN HUMAN 1 UNITS: 100 INJECTION, SOLUTION PARENTERAL at 17:25

## 2021-05-10 RX ADMIN — WARFARIN SODIUM 6 MG: 6 TABLET ORAL at 17:33

## 2021-05-10 ASSESSMENT — COPD QUESTIONNAIRES
DO YOU EVER COUGH UP ANY MUCUS OR PHLEGM?: YES, A FEW DAYS A WEEK OR MONTH
DURING THE PAST 4 WEEKS HOW MUCH DID YOU FEEL SHORT OF BREATH: SOME OF THE TIME
COPD SCREENING SCORE: 2
HAVE YOU SMOKED AT LEAST 100 CIGARETTES IN YOUR ENTIRE LIFE: NO/DON'T KNOW

## 2021-05-10 ASSESSMENT — PAIN DESCRIPTION - PAIN TYPE
TYPE: ACUTE PAIN

## 2021-05-10 ASSESSMENT — ENCOUNTER SYMPTOMS
PHOTOPHOBIA: 0
SPEECH CHANGE: 0
PALPITATIONS: 0
ORTHOPNEA: 0
BLOOD IN STOOL: 0
DEPRESSION: 0
WHEEZING: 0
SHORTNESS OF BREATH: 0
DIARRHEA: 0
FLANK PAIN: 0
CHILLS: 0
SENSORY CHANGE: 0
SORE THROAT: 0
MYALGIAS: 0
WEAKNESS: 0
DIZZINESS: 0
EYE DISCHARGE: 0
HEMOPTYSIS: 0
VOMITING: 0
DOUBLE VISION: 0
BRUISES/BLEEDS EASILY: 0
COUGH: 0
HEADACHES: 0
CLAUDICATION: 0
HEARTBURN: 0
BLURRED VISION: 0
NAUSEA: 0
BACK PAIN: 0
FEVER: 0
ABDOMINAL PAIN: 0
SPUTUM PRODUCTION: 0

## 2021-05-10 ASSESSMENT — COGNITIVE AND FUNCTIONAL STATUS - GENERAL
WALKING IN HOSPITAL ROOM: A LITTLE
MOBILITY SCORE: 15
STANDING UP FROM CHAIR USING ARMS: A LITTLE
TURNING FROM BACK TO SIDE WHILE IN FLAT BAD: A LITTLE
MOVING FROM LYING ON BACK TO SITTING ON SIDE OF FLAT BED: A LOT
CLIMB 3 TO 5 STEPS WITH RAILING: A LOT
SUGGESTED CMS G CODE MODIFIER MOBILITY: CK
MOVING TO AND FROM BED TO CHAIR: A LOT

## 2021-05-10 ASSESSMENT — GAIT ASSESSMENTS
ASSISTIVE DEVICE: HAND HELD ASSIST
DEVIATION: SHUFFLED GAIT;DECREASED HEEL STRIKE;DECREASED TOE OFF
GAIT LEVEL OF ASSIST: MINIMAL ASSIST
DISTANCE (FEET): 3

## 2021-05-10 ASSESSMENT — FIBROSIS 4 INDEX: FIB4 SCORE: 0.49

## 2021-05-10 NOTE — PROGRESS NOTES
Assumed care of pt. Bedside report received from night shift RNJessie. Pt aaox4,on 2L and no signs of distress noted at this time. Tele box is on and rhythm verified. POC discussed with pt and pt verbalized no questions at this time. Bed low and locked. Fall precautions in place. Call light and personal belongings within reach. All needs met at this time. Will continue POC

## 2021-05-10 NOTE — THERAPY
Physical Therapy   Initial Evaluation     Patient Name: Lorene Haro  Age:  45 y.o., Sex:  female  Medical Record #: [id number]  Today's Date: 5/10/2021     Precautions: Fall Risk    Assessment  Patient is 45 y.o. female admitted with SOB. PMH includes schizophrenia, bipolar d/o, COPD, morbid obesity, smoking. Pt lives in independent living and was independent with mobility using FWW. Pt currently limited by dizziness and fatigue and only able to complete STS and SPT to and from chair during assessment. At this time, pt would benefit from post acute placement to assist pt return to her prior level of function. PT will cont while in acute.    Plan    Recommend Physical Therapy 3 times per week until therapy goals are met for the following treatments:  Bed Mobility, Gait Training, Neuro Re-Education / Balance, Self Care/Home Evaluation, Therapeutic Activities and Therapeutic Exercises    DC Equipment Recommendations: Unable to determine at this time  Discharge Recommendations: Recommend post-acute placement for additional physical therapy services prior to discharge home          05/10/21 0847   Prior Living Situation   Prior Services Intermittent Physical Support for ADL Per Service   Housing / Facility 2 Story Apartment / Condo   Steps Into Home 0   Steps In Home 0   Elevator Yes   Bathroom Set up Bathtub / Shower Combination;Shower Chair   Equipment Owned Front-Wheel Walker   Lives with - Patient's Self Care Capacity Unrelated Adult   Comments pt lives in independent living    Prior Level of Functional Mobility   Bed Mobility Independent   Transfer Status Independent   Ambulation Independent   Distance Ambulation (Feet)   (community)   Assistive Devices Used Front-Wheel Walker   Comments independent with mobility prior    Cognition    Level of Consciousness Alert   Comments flat affect   Strength Lower Body   Lower Body Strength  X   Gross Strength Generalized Weakness, Equal Bilaterally   Comments functional but  weak   Gait Analysis   Gait Level Of Assist Minimal Assist   Assistive Device Hand Held Assist   Distance (Feet) 3   # of Times Distance was Traveled 1   Deviation Shuffled Gait;Decreased Heel Strike;Decreased Toe Off   # of Stairs Climbed 0   Weight Bearing Status fwb   Comments limited by fatigue and dizziness   Bed Mobility    Supine to Sit Minimal Assist   Sit to Supine Supervised   Scooting Supervised   Functional Mobility   Sit to Stand Minimal Assist   Bed, Chair, Wheelchair Transfer Minimal Assist   Toilet Transfers Minimal Assist  (BSC)   Transfer Method Stand Step   Mobility bed mob, STS, SPT x2   Short Term Goals    Short Term Goal # 1 pt will go supine<>sit w/hob flat and rails down I in 6tx for safe d/c home    Short Term Goal # 2 pt will go sit<>stand w/fww w/ SPV in 6tx for safe d/c home    Short Term Goal # 3 pt will transfer bed<>chair w/fww w/SPV in 6tx for safe d/c home    Short Term Goal # 4 pt will ambulate for 150ft w/fww w/SPV in 6tx for safe d/c home    Anticipated Discharge Equipment and Recommendations   DC Equipment Recommendations Unable to determine at this time   Discharge Recommendations Recommend post-acute placement for additional physical therapy services prior to discharge home

## 2021-05-10 NOTE — RESPIRATORY CARE
"COPD EDUCATION by COPD CLINICAL EDUCATOR  5/10/2021  at  1:22 PM by Joana Ireland, RRT     Patient interviewed by COPD education team.  Patient unable to participate in full program.  Short intervention has been conducted.  A comprehensive packet including information about COPD and home treatment with nebulizer cleaning.      COPD Assessment  COPD Clinical Specialists ONLY  COPD Education Initiated: Yes--Short Intervention, Yes--30 Day Readmission  Physician Name: pending appointment [phone]  Pulmonary Follow Up Appointment: 05/17/21  Appt Time: 1310  Pulmonologist Name: Jessica Whitfield  Referrals Initiated: Yes  Smoking Cessation: N/A(quit smoking december 2020)  Palliative Care: Yes  Hospice: N/A  Home Health Care: Yes([facility])  [facility] Community Outreach: Yes  [facility]: N/A  [facility]  Private In-Home Care Agency: N/A  Is this a COPD exacerbation patient?: Yes  $ Demo/Eval of SVN's, MDI's and Aerosols: Yes    Meds to Beds  Would the patient like to opt in for Bedside Medication Delivery at Discharge?: Yes, interested     MY COPD ACTION PLAN     It is recommended that patients and physicians /healthcare providers complete this action plan together. This plan should be discussed at each physician visit and updated as needed.    The green, yellow and red zones show groups of symptoms of COPD. This list of symptoms is not comprehensive, and you may experience other symptoms. In the \"Actions\" column, your healthcare provider has recommended actions for you to take based on your symptoms.    Patient Name: Lorene Haro   YOB: 1975   Last Updated on: 5/10/2021  1:22 PM   Green Zone:  I am doing well today Actions   •  Usual activitiy and exercise level •  Take daily medications   •  Usual amounts of cough and phlegm/mucus •  Use oxygen as prescribed   •  Sleep well at night •  Continue regular exercise/diet plan   •  Appetite is good •  At all times avoid " "cigarette smoke, inhaled irritants     Daily Medications (these medications are taken every day):   Tiotropium Bromide Monohydrate (Spiriva) 2 Puffs Once daily        Yellow Zone:  I am having a bad day or a COPD flare Actions   •  More breathless than usual •  Continue daily medications   •  I have less energy for my daily activities •  Use quick relief inhaler as ordered   •  Increased or thicker phlegm/mucus •  Use oxygen as prescribed   •  Using quick relief inhaler/nebulizer more often •  Get plenty of rest   •  Swelling of ankles more than usual •  Use pursed lip breathing   •  More coughing than usual •  At all times avoid cigarette smoke, inhaled irritants   •  I feel like I have a \"chest cold\"   •  Poor sleep and my symptoms woke me up   •  My appetite is not good   •  My medicine is not helping    •  Call provider immediately if symptoms don’t improve     Continue daily medications, add rescue medications:   Albuterol/Ipratropium (Combivent, Duoneb)  Albuterol 3mL via nebulizer  2 Puffs Every 4 hours PRN  Every 4 hours PRN       Medications to be used during a flare up, (as Discussed with Provider):           Additional Information:  Use albuterol inhaler with spacer and rinse nebulizer per manufacturers recommendation    Red Zone:  I need urgent medical care Actions   •  Severe shortness of breath even at rest •  Call 911 or seek medical care immediately   •  Not able to do any activity because of breathing    •  Fever or shaking chills    •  Feeling confused or very drowsy     •  Chest pains    •  Coughing up blood              "

## 2021-05-10 NOTE — CARE PLAN
Problem: Safety  Goal: Will remain freePlan of care discussed w/ pt. Encourage pt to voice feelings/concerns regarding treatment plan, diagnostic tests, procedures/results and medications. Answer accordingly.   from falls  Outcome: PROGRESSING AS EXPECTED   Patient's risk for injury and falls assessed. Appropriate safety precautions in place. Patient educated to utilize call light for needs. Patient verbalizes understanding.       Problem: Infection  Goal: Will remain free from infection  Outcome: PROGRESSING AS EXPECTED   Standard precautions in place. Hand hygiene performed before and after pt contact.       Problem: Knowledge Deficit  Goal: Knowledge of disease process/condition, treatment plan, diagnostic tests, and medications will improve  Outcome: PROGRESSING AS EXPECTED  Plan of care discussed w/ pt. Encourage pt to voice feelings/concerns regarding treatment plan, diagnostic tests, procedures/results and medications. Answer accordingly.

## 2021-05-10 NOTE — CARE PLAN
Problem: Communication  Goal: The ability to communicate needs accurately and effectively will improve  Outcome: PROGRESSING AS EXPECTED  Patient educated to utilize call light. Patient and family oriented to hospital room. Patient encouraged to ask questions about plan of care. Patient effectively uses call light and is involved in POC.     Problem: Safety  Goal: Will remain free from injury  Outcome: PROGRESSING AS EXPECTED  Goal: Will remain free from falls  Outcome: PROGRESSING AS EXPECTED  Pt remains free from falls at this time. Safety precautions in place. Pt educated on calling for assistance when needed.

## 2021-05-10 NOTE — PROGRESS NOTES
Handoff report received. Assumed patient care. Patient resting in bed. Patient not in distress, AAOX 4. Pt is on O2, 2L NC. Safety precautions in place. Call light and personal belongings within reach. Bed is locked and in lowest position. Educated to call for assistance if needed, pt verbalized understanding.

## 2021-05-10 NOTE — PROGRESS NOTES
[facility] Medicine Daily Progress Note    Date of Service  5/10/2021    Chief Complaint  45 y.o. female admitted 5/8/2021 with shortness of breath     Hospital Course    The patient is a 45-year-old female with a past medical history of COPD, schizophrenia, bipolar disorder, and morbid obesity who presents to the ED with a chief complaint of worsening shortness of breath.  D-dimer was not elevated on admission and patient without any other risk factors to develop acute pulmonary embolism.  NT proBNP not significantly elevated and patient without significant cardiac history placing acute CHF exacerbation on the differential.  Patient with low procalcitonin and without focal evidence of consolidation on chest imaging and she remains afebrile.  Low risk for community-acquired pneumonia.  Patient likely with COPD exacerbation.  She states that she is compliant with her home inhalers but she does continue to smoke which likely triggered this exacerbation.  She is currently back to her baseline home oxygen levels and remains comfortable.  All labs and images reviewed.  She will continue current management for COPD exacerbation.      Interval Problem Update  5/10/2021: The patient was seen and evaluated at bedside and appears comfortable.  She was evaluated by PT who recommended skilled nursing facility which is pending. She is currently back to her home oxygen levels. Patient denies any acute worsening of shortness of breath and productive cough.  She remains afebrile and hemodynamically stable.  No chest pains or palpitations noted.  No other overnight events reported.    Consultants/Specialty  None    Code Status  Full Code    Disposition  Pending placement to SNF    Review of Systems  Review of Systems   Constitutional: Negative for chills, fever and malaise/fatigue.   HENT: Negative for congestion, hearing loss, sore throat and tinnitus.    Eyes: Negative for blurred vision, double vision, photophobia and discharge.    Respiratory: Negative for cough, hemoptysis, sputum production, shortness of breath and wheezing.    Cardiovascular: Negative for chest pain, palpitations, orthopnea, claudication and leg swelling.   Gastrointestinal: Negative for abdominal pain, blood in stool, diarrhea, heartburn, melena, nausea and vomiting.   Genitourinary: Negative for dysuria, flank pain, hematuria and urgency.   Musculoskeletal: Negative for back pain, joint pain and myalgias.   Skin: Negative for itching and rash.   Neurological: Negative for dizziness, sensory change, speech change, weakness and headaches.   Endo/Heme/Allergies: Does not bruise/bleed easily.   Psychiatric/Behavioral: Negative for depression and suicidal ideas.        Physical Exam  Temp:  [36.1 °C (96.9 °F)-36.8 °C (98.2 °F)] 36.2 °C (97.1 °F)  Pulse:  [51-75] 75  Resp:  [17-21] 18  BP: (112-139)/(51-95) 139/72  SpO2:  [91 %-99 %] 94 %    Physical Exam  Vitals and nursing note reviewed.   Constitutional:       General: She is not in acute distress.     Appearance: Normal appearance. She is obese.   HENT:      Head: Normocephalic and atraumatic.      Mouth/Throat:      Mouth: Mucous membranes are moist.   Eyes:      Extraocular Movements: Extraocular movements intact.      Conjunctiva/sclera: Conjunctivae normal.      Pupils: Pupils are equal, round, and reactive to light.   Cardiovascular:      Rate and Rhythm: Normal rate and regular rhythm.      Heart sounds: No murmur. No friction rub.   Pulmonary:      Effort: Pulmonary effort is normal. No respiratory distress.      Breath sounds: Normal breath sounds. No wheezing.   Abdominal:      General: Abdomen is flat. Bowel sounds are normal.      Palpations: Abdomen is soft.      Tenderness: There is no abdominal tenderness. There is no guarding.   Musculoskeletal:         General: No swelling or tenderness. Normal range of motion.      Cervical back: Normal range of motion and neck supple.   Skin:     General: Skin is warm  and dry.      Findings: No rash.   Neurological:      General: No focal deficit present.      Mental Status: She is alert and oriented to person, place, and time.      Cranial Nerves: No cranial nerve deficit.      Motor: No weakness.   Psychiatric:         Mood and Affect: Mood normal.         Behavior: Behavior normal.         Fluids    Intake/Output Summary (Last 24 hours) at 5/10/2021 1352  Last data filed at 5/10/2021 0100  Gross per 24 hour   Intake 200 ml   Output 800 ml   Net -600 ml       Laboratory  Recent Labs     05/08/21  2320 05/10/21  0542   WBC 11.1* 13.2*   RBC 3.90* 3.98*   HEMOGLOBIN 10.8* 11.1*   HEMATOCRIT 36.6* 36.9*   MCV 93.8 92.7   MCH 27.7 27.9   MCHC 29.5* 30.1*   RDW 58.4* 56.5*   PLATELETCT 228 279   MPV 11.3 11.2     Recent Labs     05/08/21  2153 05/10/21  0542   SODIUM 141 136   POTASSIUM 4.2 4.4   CHLORIDE 104 100   CO2 32 32   GLUCOSE 89 169*   BUN 9 17   CREATININE 0.96 0.96   CALCIUM 8.9 9.6     Recent Labs     05/08/21  2153 05/09/21  0309 05/10/21  0542   INR 1.62* 1.64* 1.39*         Recent Labs     05/10/21  0542   TRIGLYCERIDE 126   HDL 48   *       Imaging  DX-CHEST-PORTABLE (1 VIEW)   Final Result         1. No acute cardiopulmonary abnormalities are identified.           Assessment/Plan  Acute on chronic respiratory failure (HCC)- (present on admission)  Assessment & Plan  2/2 copd vs. chf vs. pna  's  Dimer negative  Obtain procal  Mild leukocytosis with left shift, consider soft sepsis workup, although most likely reactive, pending procal level.  Most likely copd exacerbation: duonebs, home inhalers, and solumedrol 62.5mb BID ordered, taper steroids and O2 support commensurate with recovery.    KATHIA and COPD overlap syndrome (HCC)- (present on admission)  Assessment & Plan  With COPD exacerbation this admission  Continue home inhalers  Pulse ox and oxygen support, 3L required in ED.  Maintain >93%  Smoking cessation counseling 12 minutes  Consider nightly  cpap  Most likely copd exacerbation: duonebs, home inhalers, and solumedrol 62.5mb BID ordered, taper steroids and O2 support commensurate with recovery.        Yeast infection of the skin- (present on admission)  Assessment & Plan  Continue nystatin to intertriginous areas including under breasts and crux    Paroxysmal atrial fibrillation (HCC)- (present on admission)  Assessment & Plan  Continue warfarin per pharmacy, outside dosing lists nightly dose at 6mg qhs  Presumed inr goal of 2-3.  Daily INRs: 1.62 upon admission.    Elevated brain natriuretic peptide (BNP) level- (present on admission)  Assessment & Plan  Chronic  >500 upon this admission. Ranges from 200-500 on previous labs within past year.  Low suspicion for CHF  Last Echo performed 3/20/21: Prior echocardiogram 1/18/2021. No changes.  Normal left ventricular systolic function. Left ventricular ejection fraction is visually estimated to be 60%. The right ventricle was normal in size and function.    Chest pain, rule out acute myocardial infarction- (present on admission)  Assessment & Plan  Trop initially negative  EKG not impressive  Low suspicion of MI  Trend trop  Repeat ekg in day    Tobacco use- (present on admission)  Assessment & Plan  Smoking cessation counseling for 12 minutes performed  Nicotine patch upon request    Schizophrenia (HCC)- (present on admission)  Assessment & Plan  Lithium level in AM  Cont. 300mg in AM, 600mg qhs unless levels high  Cont. abilify 30mg qam    Morbid obesity (HCC)- (present on admission)  Assessment & Plan  Continue to  on lifestyle modifications to include diet and exercise     Leukocytosis- (present on admission)  Assessment & Plan  Mild  Likely reactive  procal ordered  UA ordered  Watch off antibiotics except for Azithromycin for now and trend cbc    Iron deficiency anemia- (present on admission)  Assessment & Plan  Iron studies and ferriting    Bipolar Disorder- (present on admission)  Assessment &  Plan  Continue home medications          VTE prophylaxis: warfarin

## 2021-05-10 NOTE — PROGRESS NOTES
Inpatient Anticoagulation Service Note    Date: 5/10/2021  Reason for Anticoagulation: Atrial Fibrillation   [id number] VASc Score: 1  HAS-BLED Score: 1    Hemoglobin Value: (!) 11.1  Hematocrit Value: (!) 36.9  Lab Platelet Value: 279  Target INR: 2.0 to 3.0    INR from last 7 days     Date/Time INR Value    05/10/21 0542  (!) 1.39    21 0309  (!) 1.64    21 2153  (!) 1.62        Dose from last 7 days     Date/Time Dose (mg)    05/10/21 1442  6    21 0158  6        Average Dose (mg): 6  Significant Interactions: Statin, Antibiotics, Corticosteroids  Bridge Therapy: No     Reversal Agent Administered: Not Applicable  Comments: On warfarin for a fib. Patient currently subtherapeutic after alternating 3mg and 6mg doses prior to admit. No signs/sx of bleed    Plan:  Resume intended home dosing regimen of 6mg daily per [facility]'s last note. Monitor inr daily currently.  Education Material Provided?: No(on chronic therapy as outpatient)  Pharmacist suggested discharge dosinmg daily, with anticoagulation clinic follow up within 3-4 days of discharge.     David Snider, PharmD

## 2021-05-10 NOTE — DIETARY
NUTRITION SERVICES: BMI - Pt with BMI >40 (=Body mass index is 54.4 kg/m².), Class III obesity. Weight loss counseling not appropriate in acute care setting. RECOMMEND - If appropriate at DC please refer to outpatient nutrition services for weight management.

## 2021-05-10 NOTE — THERAPY
"Occupational Therapy   Initial Evaluation     Patient Name: Lorene Haro  Age:  45 y.o., Sex:  female  Medical Record #: [id number]  Today's Date: 5/9/2021       Precautions: Fall Risk    Assessment    Patient is 45 y.o. female with h/o schizophrenia, bipolar d/o, COPD, morbid obesity, smoking, admitted with SOB. Pt dx COPD exacerbation. Pt seen for OT eval. Pt completed transfer to/from [facility] using FWW; required assist for hygiene. Pt unable to reach either foot for sock management (body habitus negatively impacts). Pt appears to have poor hygiene; oily hair and flaky facial skin. Reports difficulty with tub transfer at home. Pt has assist for I-ADL from \"\" ([facility]?), but states that person does not assist with personal care. On prior OT eval in April pt reported having assist for bathing. OT at that time also recommended tub transfer bench however pt still only has shower chair. Provided hand-out with image of tub transfer bench as well as application for Care Chest. Pt would benefit from increased assist at home (due to limitations from chronic conditions). Will continue to benefit from acute OT to maximize functional independence and safety.     Plan    Recommend Occupational Therapy 3 times per week until therapy goals are met for the following treatments:  Adaptive Equipment, Neuro Re-Education / Balance, Self Care/Activities of Daily Living, Therapeutic Activities and Therapeutic Exercises.    DC Equipment Recommendations: Tub Transfer Bench  Discharge Recommendations: Recommend home health for continued occupational therapy services     Subjective    \"It's hard for me to step in and out of the tub\"     Objective       05/09/21 6810   Prior Living Situation   Housing / Facility 2 Story Apartment / Condo   Elevator Yes   Bathroom Set up Bathtub / Shower Combination;Grab Bars;Shower Chair   Equipment Owned Front-Wheel Walker;Tub / Shower Seat;Grab Bar(s) In Tub / Shower   Comments Pt reports having " "\"\" who assists with I-ADL, but not personal care. Has community payee.    Prior Level of ADL Function   Self Feeding Independent   Grooming / Hygiene Independent   Bathing Requires Assist  (reports difficulty with bathing, so completes infrequently )   Dressing Requires Assist  (reports difficulty with LB dressing )   Toileting Independent   Comments Denies support for personal care    Prior Level of IADL Function   Medication Management Requires Assist   Laundry Requires Assist   Kitchen Mobility Independent   Finances Requires Assist  (has payee)   Home Management Requires Assist   Shopping Requires Assist   Prior Level Of Mobility Independent With Device in Community;Independent With Device in Home  (limited community distances )   Driving / Transportation Utilizes Public Transportation;Relatives / Others Provide Transportation   Occupation (Pre-Hospital Vocational) Retired Due To Disability   Balance Assessment   Sitting Balance (Static) Fair +   Sitting Balance (Dynamic) Fair   Standing Balance (Static) Fair   Standing Balance (Dynamic) Fair -   Weight Shift Sitting Fair   Weight Shift Standing Fair   Comments FWW for standing    Bed Mobility    Supine to Sit Supervised  (flat bed, used rail )   Scooting Supervised  (seated)   ADL Assessment   Grooming Seated;Minimal Assist  (mod I oral care, min A to brush hair )   Lower Body Dressing Maximal Assist  (unable to reach either foot for LB dressing )   Toileting Maximal Assist  (urination hygiene )   Functional Mobility   Sit to Stand Supervised   Bed, Chair, Wheelchair Transfer Supervised   Toilet Transfers Supervised  (to/from [facility])   Transfer Method Stand Step  (FWW)   Mobility transfers in room    Short Term Goals   Short Term Goal # 1 Pt will complete standing grooming at sink with supv    Short Term Goal # 2 Pt will complete LB dressing with supv using AE   Short Term Goal # 3 Pt will stand >15 min with supv to complete ADL without rest break  "

## 2021-05-11 PROBLEM — F17.200 SMOKING: Status: ACTIVE | Noted: 2021-05-11

## 2021-05-11 LAB
GLUCOSE BLD-MCNC: 104 MG/DL (ref 65–99)
GLUCOSE BLD-MCNC: 114 MG/DL (ref 65–99)
GLUCOSE BLD-MCNC: 135 MG/DL (ref 65–99)
GLUCOSE BLD-MCNC: 173 MG/DL (ref 65–99)
INR PPP: 1.48 (ref 0.87–1.13)
PROTHROMBIN TIME: 18.4 SEC (ref 12–14.6)

## 2021-05-11 PROCEDURE — 94660 CPAP INITIATION&MGMT: CPT

## 2021-05-11 PROCEDURE — 700102 HCHG RX REV CODE 250 W/ 637 OVERRIDE(OP): Performed by: STUDENT IN AN ORGANIZED HEALTH CARE EDUCATION/TRAINING PROGRAM

## 2021-05-11 PROCEDURE — 99406 BEHAV CHNG SMOKING 3-10 MIN: CPT | Performed by: STUDENT IN AN ORGANIZED HEALTH CARE EDUCATION/TRAINING PROGRAM

## 2021-05-11 PROCEDURE — G0378 HOSPITAL OBSERVATION PER HR: HCPCS

## 2021-05-11 PROCEDURE — A9270 NON-COVERED ITEM OR SERVICE: HCPCS | Performed by: STUDENT IN AN ORGANIZED HEALTH CARE EDUCATION/TRAINING PROGRAM

## 2021-05-11 PROCEDURE — 99226 PR SUBSEQUENT OBSERVATION CARE,LEVEL III: CPT | Mod: 25 | Performed by: STUDENT IN AN ORGANIZED HEALTH CARE EDUCATION/TRAINING PROGRAM

## 2021-05-11 PROCEDURE — 96366 THER/PROPH/DIAG IV INF ADDON: CPT

## 2021-05-11 PROCEDURE — 700105 HCHG RX REV CODE 258: Performed by: INTERNAL MEDICINE

## 2021-05-11 PROCEDURE — 94640 AIRWAY INHALATION TREATMENT: CPT

## 2021-05-11 PROCEDURE — 85610 PROTHROMBIN TIME: CPT

## 2021-05-11 PROCEDURE — 82962 GLUCOSE BLOOD TEST: CPT

## 2021-05-11 PROCEDURE — 94760 N-INVAS EAR/PLS OXIMETRY 1: CPT

## 2021-05-11 PROCEDURE — 96372 THER/PROPH/DIAG INJ SC/IM: CPT

## 2021-05-11 PROCEDURE — 36415 COLL VENOUS BLD VENIPUNCTURE: CPT

## 2021-05-11 PROCEDURE — 700111 HCHG RX REV CODE 636 W/ 250 OVERRIDE (IP): Performed by: INTERNAL MEDICINE

## 2021-05-11 RX ORDER — WARFARIN SODIUM 4 MG/1
8 TABLET ORAL
Status: COMPLETED | OUTPATIENT
Start: 2021-05-11 | End: 2021-05-11

## 2021-05-11 RX ORDER — PREDNISONE 20 MG/1
40 TABLET ORAL DAILY
Status: COMPLETED | OUTPATIENT
Start: 2021-05-12 | End: 2021-05-16

## 2021-05-11 RX ADMIN — LITHIUM CARBONATE 300 MG: 300 TABLET, FILM COATED, EXTENDED RELEASE ORAL at 04:12

## 2021-05-11 RX ADMIN — NYSTATIN 100000 UNITS: 100000 POWDER TOPICAL at 04:11

## 2021-05-11 RX ADMIN — METOPROLOL SUCCINATE 25 MG: 25 TABLET, EXTENDED RELEASE ORAL at 04:12

## 2021-05-11 RX ADMIN — INSULIN HUMAN 1 UNITS: 100 INJECTION, SOLUTION PARENTERAL at 12:17

## 2021-05-11 RX ADMIN — PREDNISONE 40 MG: 20 TABLET ORAL at 04:11

## 2021-05-11 RX ADMIN — NYSTATIN 100000 UNITS: 100000 POWDER TOPICAL at 17:38

## 2021-05-11 RX ADMIN — FUROSEMIDE 20 MG: 40 TABLET ORAL at 17:38

## 2021-05-11 RX ADMIN — ATORVASTATIN CALCIUM 20 MG: 20 TABLET, FILM COATED ORAL at 17:38

## 2021-05-11 RX ADMIN — FLUTICASONE PROPIONATE 50 MCG: 50 SPRAY, METERED NASAL at 00:46

## 2021-05-11 RX ADMIN — WARFARIN SODIUM 8 MG: 4 TABLET ORAL at 17:38

## 2021-05-11 RX ADMIN — FUROSEMIDE 20 MG: 40 TABLET ORAL at 04:12

## 2021-05-11 RX ADMIN — AZITHROMYCIN MONOHYDRATE 500 MG: 250 TABLET ORAL at 04:11

## 2021-05-11 RX ADMIN — ARIPIPRAZOLE 30 MG: 10 TABLET ORAL at 04:11

## 2021-05-11 RX ADMIN — FLUTICASONE PROPIONATE 50 MCG: 50 SPRAY, METERED NASAL at 12:17

## 2021-05-11 RX ADMIN — ACETAMINOPHEN 650 MG: 325 TABLET, FILM COATED ORAL at 18:31

## 2021-05-11 RX ADMIN — LITHIUM CARBONATE 600 MG: 300 TABLET, FILM COATED, EXTENDED RELEASE ORAL at 17:38

## 2021-05-11 RX ADMIN — MONTELUKAST 10 MG: 10 TABLET, FILM COATED ORAL at 21:32

## 2021-05-11 RX ADMIN — SODIUM CHLORIDE 125 MG: 9 INJECTION, SOLUTION INTRAVENOUS at 22:52

## 2021-05-11 RX ADMIN — TIOTROPIUM BROMIDE INHALATION SPRAY 5 MCG: 3.12 SPRAY, METERED RESPIRATORY (INHALATION) at 10:40

## 2021-05-11 ASSESSMENT — ENCOUNTER SYMPTOMS
PHOTOPHOBIA: 0
WHEEZING: 0
HEARTBURN: 0
SENSORY CHANGE: 0
CLAUDICATION: 0
BLURRED VISION: 0
DEPRESSION: 0
FEVER: 0
DOUBLE VISION: 0
COUGH: 0
HEADACHES: 0
BRUISES/BLEEDS EASILY: 0
SHORTNESS OF BREATH: 1
SORE THROAT: 0
VOMITING: 0
SPEECH CHANGE: 0
DIZZINESS: 0
MYALGIAS: 0
FLANK PAIN: 0
BACK PAIN: 0
PALPITATIONS: 0
EYE DISCHARGE: 0
SPUTUM PRODUCTION: 0
ABDOMINAL PAIN: 0
DIARRHEA: 0
BLOOD IN STOOL: 0
ORTHOPNEA: 0
WEAKNESS: 0
CHILLS: 0
NAUSEA: 0
HEMOPTYSIS: 0

## 2021-05-11 ASSESSMENT — PAIN DESCRIPTION - PAIN TYPE
TYPE: ACUTE PAIN
TYPE: ACUTE PAIN

## 2021-05-11 ASSESSMENT — FIBROSIS 4 INDEX
FIB4 SCORE: 0.49
FIB4 SCORE: 0.59

## 2021-05-11 NOTE — PROGRESS NOTES
Inpatient Anticoagulation Service Note    Date: 2021  Reason for Anticoagulation: Atrial Fibrillation   [id number] VASc Score: 1  HAS-BLED Score: 1    Hemoglobin Value: (!) 11.1  Hematocrit Value: (!) 36.9  Lab Platelet Value: 279  Target INR: 2.0 to 3.0    INR from last 7 days     Date/Time INR Value    21 0339  (!) 1.48    05/10/21 0542  (!) 1.39    21 0309  (!) 1.64    21 2153  (!) 1.62        Dose from last 7 days     Date/Time Dose (mg)    21 1417  8    05/10/21 1442  6    21 0158  6        Average Dose (mg): 6  Significant Interactions: Statin, Corticosteroids, antibiotics  Bridge Therapy: No    Reversal Agent Administered: Not Applicable  Comments: no significant INR change after 2 doses of 6mg    Plan:  Increase today's dose to warfarin 8mg x1 dose. Repeat INR in AM   No CBC today, no s/sx bleeding per chart review. Pt received 3rd and final azithromycin dose today, continues on steroids.     Education Material Provided?: No(on chronic therapy as outpatient)  Pharmacist suggested discharge dosin mg daily (previous intended home dosing) with INR follow up within 2-3 days of discharge. Patient follows with [facility].     David Snidre, HildaD

## 2021-05-11 NOTE — PROGRESS NOTES
[facility] Medicine Daily Progress Note    Date of Service  5/11/2021    Chief Complaint  45 y.o. female admitted 5/8/2021 with shortness of breath     Hospital Course    The patient is a 45-year-old female with a past medical history of COPD on 3L/min, schizophrenia, bipolar disorder, and morbid obesity who presents to the ED with a chief complaint of worsening shortness of breath.  D-dimer was not elevated on admission and patient without any other risk factors to develop acute pulmonary embolism.  NT proBNP 457.   Patient with low procalcitonin and without focal evidence of consolidation on chest imaging and she remains afebrile.  Low risk for community-acquired pneumonia.  Patient likely with COPD exacerbation.  She states that she is compliant with her home inhalers but she does continue to smoke which likely triggered this exacerbation.  She is currently back to her baseline home oxygen levels and remains comfortable.  All labs and images reviewed.  She will continue current management for COPD exacerbation.      Interval Problem Update  5/10/2021: The patient was seen and evaluated at bedside and appears comfortable.  She was evaluated by PT who recommended skilled nursing facility which is pending. She is currently back to her home oxygen levels. Patient denies any acute worsening of shortness of breath and productive cough.  She remains afebrile and hemodynamically stable.  No chest pains or palpitations noted.  No other overnight events reported.    All Data, Medication data reviewed.  Case discussed with nursing, CM,  nurse, pharmacy in IDT rounds.   Difficult placement for SNF vs. C due to insurance issue    Consultants/Specialty  None    Code Status  Full Code    Disposition  Pending placement to SNF vs C    Review of Systems  Review of Systems   Constitutional: Negative for chills, fever and malaise/fatigue.   HENT: Negative for congestion, hearing loss, sore throat and tinnitus.    Eyes:  Negative for blurred vision, double vision, photophobia and discharge.   Respiratory: Positive for shortness of breath. Negative for cough, hemoptysis, sputum production and wheezing.    Cardiovascular: Negative for chest pain, palpitations, orthopnea, claudication and leg swelling.   Gastrointestinal: Negative for abdominal pain, blood in stool, diarrhea, heartburn, melena, nausea and vomiting.   Genitourinary: Negative for dysuria, flank pain, hematuria and urgency.   Musculoskeletal: Negative for back pain, joint pain and myalgias.   Skin: Negative for itching and rash.   Neurological: Negative for dizziness, sensory change, speech change, weakness and headaches.   Endo/Heme/Allergies: Does not bruise/bleed easily.   Psychiatric/Behavioral: Negative for depression and suicidal ideas.        Physical Exam  Temp:  [36.1 °C (96.9 °F)-36.4 °C (97.6 °F)] 36.1 °C (96.9 °F)  Pulse:  [55-71] 63  Resp:  [18] 18  BP: (112-136)/(51-67) 136/67  SpO2:  [94 %-98 %] 94 %    Physical Exam  Vitals and nursing note reviewed.   Constitutional:       General: She is not in acute distress.     Appearance: Normal appearance. She is obese.   HENT:      Head: Normocephalic and atraumatic.      Mouth/Throat:      Mouth: Mucous membranes are moist.   Eyes:      Extraocular Movements: Extraocular movements intact.      Conjunctiva/sclera: Conjunctivae normal.      Pupils: Pupils are equal, round, and reactive to light.   Cardiovascular:      Rate and Rhythm: Normal rate and regular rhythm.      Heart sounds: No murmur. No friction rub.   Pulmonary:      Effort: Pulmonary effort is normal. No respiratory distress.      Breath sounds: Normal breath sounds. No wheezing.   Abdominal:      General: Abdomen is flat. Bowel sounds are normal.      Palpations: Abdomen is soft.      Tenderness: There is no abdominal tenderness. There is no guarding.   Musculoskeletal:         General: No swelling or tenderness. Normal range of motion.      Cervical  back: Normal range of motion and neck supple.   Skin:     General: Skin is warm and dry.      Findings: No rash.   Neurological:      General: No focal deficit present.      Mental Status: She is alert and oriented to person, place, and time.      Cranial Nerves: No cranial nerve deficit.      Motor: No weakness.   Psychiatric:         Mood and Affect: Mood normal.         Behavior: Behavior normal.         Fluids    Intake/Output Summary (Last 24 hours) at 5/11/2021 1325  Last data filed at 5/11/2021 1323  Gross per 24 hour   Intake 480 ml   Output 1200 ml   Net -720 ml       Laboratory  Recent Labs     05/08/21  2320 05/10/21  0542   WBC 11.1* 13.2*   RBC 3.90* 3.98*   HEMOGLOBIN 10.8* 11.1*   HEMATOCRIT 36.6* 36.9*   MCV 93.8 92.7   MCH 27.7 27.9   MCHC 29.5* 30.1*   RDW 58.4* 56.5*   PLATELETCT 228 279   MPV 11.3 11.2     Recent Labs     05/08/21  2153 05/10/21  0542   SODIUM 141 136   POTASSIUM 4.2 4.4   CHLORIDE 104 100   CO2 32 32   GLUCOSE 89 169*   BUN 9 17   CREATININE 0.96 0.96   CALCIUM 8.9 9.6     Recent Labs     05/09/21  0309 05/10/21  0542 05/11/21  0339   INR 1.64* 1.39* 1.48*         Recent Labs     05/10/21  0542   TRIGLYCERIDE 126   HDL 48   *       Imaging  DX-CHEST-PORTABLE (1 VIEW)   Final Result         1. No acute cardiopulmonary abnormalities are identified.           Assessment/Plan  Acute on chronic respiratory failure (HCC)- (present on admission)  Assessment & Plan  Multifactorial, likely sec 2/2 copd and obesity hypoventilation syndrome, KATHIA  's  Dimer negative  Obtain procal neg  Cont duonebs, home inhalers, and steroids and O2 support  IS    KATHIA and COPD overlap syndrome (HCC)- (present on admission)  Assessment & Plan  With COPD exacerbation this admission  Continue home inhalers  Pulse ox and oxygen support, 3L required in ED.  Maintain >93%  Smoking cessation counseling 12 minutes  Consider nightly cpap  Most likely copd exacerbation: duonebs, home inhalers, and  solumedrol 62.5mb BID ordered, taper steroids and O2 support commensurate with recovery.        Paroxysmal atrial fibrillation (HCC)- (present on admission)  Assessment & Plan  On coumadin, will continue with Coumadin. I coordinate with pharmacy for coumadin dosing. INR target 2-3. Closely monitor any sign of bleeding.  Presumed inr goal of 2-3.  Daily INRs        Elevated brain natriuretic peptide (BNP) level- (present on admission)  Assessment & Plan  Chronic  >500 upon this admission. Ranges from 200-500 on previous labs within past year.  Low suspicion for CHF  Last Echo performed 3/20/21: Prior echocardiogram 1/18/2021. No changes.  Normal left ventricular systolic function. Left ventricular ejection fraction is visually estimated to be 60%. The right ventricle was normal in size and function.  On lasix     Chest pain, rule out acute myocardial infarction- (present on admission)  Assessment & Plan  Trop trending neg  EKG: NSR, no acute ST-T changes  Reports chest pain resolved    Yeast infection of the skin- (present on admission)  Assessment & Plan  Continue nystatin to intertriginous areas including under breasts and crux    Tobacco use- (present on admission)  Assessment & Plan  5 minutes on tobacco cessation counseling provided including nicotine patches, gum, and dangers of smoking. Discussed the risks of smoking including increased risk of heart disease, stroke, cancer and COPD. Discussed the benefits of quitting smoking.       Schizophrenia (HCC)- (present on admission)  Assessment & Plan  Lithium level in AM  Cont. 300mg in AM, 600mg qhs unless levels high  Cont. abilify 30mg qam    Morbid obesity (HCC)- (present on admission)  Assessment & Plan  Continue to  on lifestyle modifications to include diet and exercise     Leukocytosis- (present on admission)  Assessment & Plan  Procal neg.   Mild, Likely reactive, sec to steroid use  Cont to monitor    Iron deficiency anemia- (present on  admission)  Assessment & Plan  Iron studies and ferriting, c/w ROCÍO  Denies active bleeding  Cont iron supplements    Bipolar Disorder- (present on admission)  Assessment & Plan  Continue home medications        VTE prophylaxis: warfarin

## 2021-05-11 NOTE — CONSULTS
"Reason for PC Consult: Advance Care Planning- Appreciate prompt from COPD team    Consulted by: Dr. Finch; currently Dr. Gallardo    Assessment:  General: 46 y/o woman from home with COPD exacerbation, despite self-report of using her inhalers at home. Pt does admit to continuing to smoke. She is on 3LPM at baseline; no evidence of PNA on admission. This is her 5th admission this year, in addition to 5 ER visits. PMHx of schizophrenia, bipolar, obesity, HTN    Social: Lorene lives alone in an apartment in [place] with no assistance from outside caregivers, etc. Her mom and sister live locally and provide assistance as needed. She also has a 21 y/o daughter Munira who lives in [place] and who she is in contact with, but who is not involved in her medical care/plan. Lorene is independent in function and able to manage ADLs when not struggling with dyspnea/exacerbaions. She has a shower chair but tells PC she's sometimes \"scared\" to shower on her own d/t fear of falling.    Consults: n/a    Dyspnea: No-    Last BM: 05/11/21-    Pain: No-    Depression: No-    Dementia: No;       Spiritual:  Is Yazidism or spirituality important for coping with this illness? No-    Has a  or spiritual provider visit been requested? No    Palliative Performance Scale: 70%    Advance Directive: None- completed on this visit  DPOA: No- Lorene appointed her mom Brittney and/or sister Kylah, with her uncle Maldonado as first alternate  POLST: No-      Code Status: Full- discussed at length and she would want to attempt CPR and is okay with intubation, but would not want long term life support via tracheostomy if she was intubated and unable to liberate from the vent. This was included in her AD    Outcome:  PC RN met with Lorene at bedside and explained the role of PC to help with discussions about the current medical situation and goals moving forward. She provided this RN with her family and social history up to this point. She endorses several " "visits to the hospital for COPD exacerbations and tells PC that \"I take my medications at home.\" She reports her mom and sister Kylah live a few blocks away from her in [place] and are her biggest support. She states her daughter is aware of her health but she does not involve her in decisions/plan. Lorene tells PC that she would like to have additional help at home with showers and her medications. PC discussed the role of HH, but also Medicaid caregivers who can possibly assist with hygiene, house duties, etc. She tells PC that she has a rep payee for managing her financial matters.     PC discussed Lorene's prior hospitalizations and experience with COPD. She reports having never needed to be intubated but has had bipap. PC provided education on COPD and disease progression, including the detriment of her ongoing tobacco use, need for medication compliance and appropriate medical f/u. She expressed understanding. PC explored her values, beliefs, and preferences in order to identify GOC. She tells PC that if she needed intubation or CPR, this would be acceptable to her, but that if she were unable to to return to her QoL or \"if it's my time to go, then I want to go.\" PC discussed long-term measures like tracheostomy for patients who are unable to liberate from the ventilator and she says this is not something that would be acceptable to her. PC explained that if the situation arose where she was on life support and unable to liberate from the ventilator, the goal would be focusing on her comfort and EoL than tracheostomy; she agreed. PC completed her AD noting this wish and appointing her mom and sister as co-DPOA. She selected options 2, 3, and 5 noting her focus on QoL. Lorene is aware that this will be notarized tomorrow and the original returned to her.    Updated to mom Brittney by phone with Lorene present. Lorene also called her rep payee \"Weston\" while this RN was present and explained her current situation. " Lorene was concerned with losing her apartment and Weston states that there are no concerns up to 30 days, but following this, he would have to get special approval. PC offered to update the unit SW given concerns for SNF stay prior to returning home; they were appreciative. Lorene requested assistance with ordering her meal for tomorrow. PC plugged in her room phone and call dietary who was able to take her order for Wednesday. No other questions/needs remained at this time. While talking to Lorene, PC RN provided therapeutic communication, including open ended questions, reflective listening, and normalization of thought and feelings throughout encounter, as well as reinforced her goals of care. PC contact information provided to Lorene and encouraged to call with any questions or concerns.     Recommendations: I do not recommend an ethics or hospice consult at this time because pt uninterested.    Updated: MD/RN/KETTY ()    Plan: Request for notary 5/12; SNF pending for therapies before returning home    Thank you for allowing Palliative Care to support this patient and family. Contact [phone] for additional assistance, change in patient status, or with any questions/concerns.

## 2021-05-11 NOTE — CARE PLAN
Problem: Safety  Goal: Will remain free from falls  Outcome: PROGRESSING AS EXPECTED  Intervention: Assess risk factors for falls  Flowsheets  Taken 5/10/2021 2345 by Perry Reyes R.N.  History of fall: 0  Mobility Status Assessment: 1-1 Healthcare Provider Required for Assistance with Ambulation & Transfer  Risk for Injury-Any positive answers results in the pt being at high risk for fall related injury: Not Applicable  Taken 5/10/2021 1000 by Liz Ram RDESIREE  Pt Calls for Assistance: Yes     Problem: Infection  Goal: Will remain free from infection  Outcome: PROGRESSING AS EXPECTED  Intervention: Assess signs and symptoms of infection  Note: Vital signs q 8 hours. Pt is afebrile.

## 2021-05-12 LAB
ANION GAP SERPL CALC-SCNC: 7 MMOL/L (ref 7–16)
BASOPHILS # BLD AUTO: 0.1 % (ref 0–1.8)
BASOPHILS # BLD: 0.02 K/UL (ref 0–0.12)
BUN SERPL-MCNC: 27 MG/DL (ref 8–22)
CALCIUM SERPL-MCNC: 9.1 MG/DL (ref 8.5–10.5)
CHLORIDE SERPL-SCNC: 101 MMOL/L (ref 96–112)
CO2 SERPL-SCNC: 31 MMOL/L (ref 20–33)
CREAT SERPL-MCNC: 1.05 MG/DL (ref 0.5–1.4)
EOSINOPHIL # BLD AUTO: 0.04 K/UL (ref 0–0.51)
EOSINOPHIL NFR BLD: 0.3 % (ref 0–6.9)
ERYTHROCYTE [DISTWIDTH] IN BLOOD BY AUTOMATED COUNT: 57.1 FL (ref 35.9–50)
GLUCOSE BLD-MCNC: 110 MG/DL (ref 65–99)
GLUCOSE BLD-MCNC: 152 MG/DL (ref 65–99)
GLUCOSE BLD-MCNC: 163 MG/DL (ref 65–99)
GLUCOSE BLD-MCNC: 91 MG/DL (ref 65–99)
GLUCOSE SERPL-MCNC: 93 MG/DL (ref 65–99)
HCT VFR BLD AUTO: 37 % (ref 37–47)
HGB BLD-MCNC: 11.3 G/DL (ref 12–16)
IMM GRANULOCYTES # BLD AUTO: 0.1 K/UL (ref 0–0.11)
IMM GRANULOCYTES NFR BLD AUTO: 0.7 % (ref 0–0.9)
INR PPP: 1.52 (ref 0.87–1.13)
LYMPHOCYTES # BLD AUTO: 2.1 K/UL (ref 1–4.8)
LYMPHOCYTES NFR BLD: 15.7 % (ref 22–41)
MCH RBC QN AUTO: 28.1 PG (ref 27–33)
MCHC RBC AUTO-ENTMCNC: 30.5 G/DL (ref 33.6–35)
MCV RBC AUTO: 92 FL (ref 81.4–97.8)
MONOCYTES # BLD AUTO: 0.95 K/UL (ref 0–0.85)
MONOCYTES NFR BLD AUTO: 7.1 % (ref 0–13.4)
NEUTROPHILS # BLD AUTO: 10.19 K/UL (ref 2–7.15)
NEUTROPHILS NFR BLD: 76.1 % (ref 44–72)
NRBC # BLD AUTO: 0 K/UL
NRBC BLD-RTO: 0 /100 WBC
PLATELET # BLD AUTO: 231 K/UL (ref 164–446)
PMV BLD AUTO: 10.9 FL (ref 9–12.9)
POTASSIUM SERPL-SCNC: 3.7 MMOL/L (ref 3.6–5.5)
PROTHROMBIN TIME: 18.8 SEC (ref 12–14.6)
RBC # BLD AUTO: 4.02 M/UL (ref 4.2–5.4)
SODIUM SERPL-SCNC: 139 MMOL/L (ref 135–145)
WBC # BLD AUTO: 13.4 K/UL (ref 4.8–10.8)

## 2021-05-12 PROCEDURE — G0378 HOSPITAL OBSERVATION PER HR: HCPCS

## 2021-05-12 PROCEDURE — 97530 THERAPEUTIC ACTIVITIES: CPT | Mod: CQ

## 2021-05-12 PROCEDURE — A9270 NON-COVERED ITEM OR SERVICE: HCPCS | Performed by: STUDENT IN AN ORGANIZED HEALTH CARE EDUCATION/TRAINING PROGRAM

## 2021-05-12 PROCEDURE — 94660 CPAP INITIATION&MGMT: CPT

## 2021-05-12 PROCEDURE — 700102 HCHG RX REV CODE 250 W/ 637 OVERRIDE(OP): Performed by: STUDENT IN AN ORGANIZED HEALTH CARE EDUCATION/TRAINING PROGRAM

## 2021-05-12 PROCEDURE — 700111 HCHG RX REV CODE 636 W/ 250 OVERRIDE (IP): Performed by: STUDENT IN AN ORGANIZED HEALTH CARE EDUCATION/TRAINING PROGRAM

## 2021-05-12 PROCEDURE — 85610 PROTHROMBIN TIME: CPT

## 2021-05-12 PROCEDURE — 97535 SELF CARE MNGMENT TRAINING: CPT | Mod: XE

## 2021-05-12 PROCEDURE — 85025 COMPLETE CBC W/AUTO DIFF WBC: CPT

## 2021-05-12 PROCEDURE — 99226 PR SUBSEQUENT OBSERVATION CARE,LEVEL III: CPT | Performed by: STUDENT IN AN ORGANIZED HEALTH CARE EDUCATION/TRAINING PROGRAM

## 2021-05-12 PROCEDURE — 82962 GLUCOSE BLOOD TEST: CPT

## 2021-05-12 PROCEDURE — 36415 COLL VENOUS BLD VENIPUNCTURE: CPT

## 2021-05-12 PROCEDURE — 97116 GAIT TRAINING THERAPY: CPT | Mod: CQ

## 2021-05-12 PROCEDURE — 80048 BASIC METABOLIC PNL TOTAL CA: CPT

## 2021-05-12 PROCEDURE — 94640 AIRWAY INHALATION TREATMENT: CPT

## 2021-05-12 PROCEDURE — 96372 THER/PROPH/DIAG INJ SC/IM: CPT

## 2021-05-12 RX ORDER — WARFARIN SODIUM 6 MG/1
6 TABLET ORAL DAILY
Status: DISCONTINUED | OUTPATIENT
Start: 2021-05-12 | End: 2021-05-14

## 2021-05-12 RX ORDER — FERROUS SULFATE 325(65) MG
325 TABLET ORAL
Status: DISCONTINUED | OUTPATIENT
Start: 2021-05-13 | End: 2021-05-18 | Stop reason: HOSPADM

## 2021-05-12 RX ADMIN — MONTELUKAST 10 MG: 10 TABLET, FILM COATED ORAL at 20:48

## 2021-05-12 RX ADMIN — PREDNISONE 40 MG: 20 TABLET ORAL at 06:22

## 2021-05-12 RX ADMIN — NYSTATIN 100000 UNITS: 100000 POWDER TOPICAL at 17:37

## 2021-05-12 RX ADMIN — TIOTROPIUM BROMIDE INHALATION SPRAY 5 MCG: 3.12 SPRAY, METERED RESPIRATORY (INHALATION) at 07:22

## 2021-05-12 RX ADMIN — INSULIN HUMAN 1 UNITS: 100 INJECTION, SOLUTION PARENTERAL at 17:34

## 2021-05-12 RX ADMIN — ACETAMINOPHEN 650 MG: 325 TABLET, FILM COATED ORAL at 17:42

## 2021-05-12 RX ADMIN — WARFARIN SODIUM 6 MG: 6 TABLET ORAL at 17:36

## 2021-05-12 RX ADMIN — FLUTICASONE PROPIONATE 50 MCG: 50 SPRAY, METERED NASAL at 11:55

## 2021-05-12 RX ADMIN — LITHIUM CARBONATE 300 MG: 300 TABLET, FILM COATED, EXTENDED RELEASE ORAL at 06:23

## 2021-05-12 RX ADMIN — POTASSIUM CHLORIDE 20 MEQ: 1500 TABLET, EXTENDED RELEASE ORAL at 06:22

## 2021-05-12 RX ADMIN — INSULIN HUMAN 1 UNITS: 100 INJECTION, SOLUTION PARENTERAL at 11:55

## 2021-05-12 RX ADMIN — ACETAMINOPHEN 650 MG: 325 TABLET, FILM COATED ORAL at 03:48

## 2021-05-12 RX ADMIN — ATORVASTATIN CALCIUM 20 MG: 20 TABLET, FILM COATED ORAL at 17:36

## 2021-05-12 RX ADMIN — FUROSEMIDE 20 MG: 40 TABLET ORAL at 17:36

## 2021-05-12 RX ADMIN — LITHIUM CARBONATE 600 MG: 300 TABLET, FILM COATED, EXTENDED RELEASE ORAL at 17:36

## 2021-05-12 RX ADMIN — ARIPIPRAZOLE 30 MG: 10 TABLET ORAL at 06:22

## 2021-05-12 RX ADMIN — NYSTATIN 100000 UNITS: 100000 POWDER TOPICAL at 06:23

## 2021-05-12 RX ADMIN — FLUTICASONE PROPIONATE 50 MCG: 50 SPRAY, METERED NASAL at 01:45

## 2021-05-12 ASSESSMENT — COGNITIVE AND FUNCTIONAL STATUS - GENERAL
WALKING IN HOSPITAL ROOM: A LITTLE
TURNING FROM BACK TO SIDE WHILE IN FLAT BAD: A LITTLE
MOVING TO AND FROM BED TO CHAIR: A LOT
DAILY ACTIVITIY SCORE: 13
HELP NEEDED FOR BATHING: A LOT
SUGGESTED CMS G CODE MODIFIER DAILY ACTIVITY: CL
PERSONAL GROOMING: A LOT
STANDING UP FROM CHAIR USING ARMS: A LITTLE
EATING MEALS: A LITTLE
SUGGESTED CMS G CODE MODIFIER MOBILITY: CK
DRESSING REGULAR UPPER BODY CLOTHING: A LOT
CLIMB 3 TO 5 STEPS WITH RAILING: A LOT
MOVING FROM LYING ON BACK TO SITTING ON SIDE OF FLAT BED: A LOT
DRESSING REGULAR LOWER BODY CLOTHING: A LOT
TOILETING: A LOT
MOBILITY SCORE: 15

## 2021-05-12 ASSESSMENT — ENCOUNTER SYMPTOMS
HEMOPTYSIS: 0
ABDOMINAL PAIN: 0
CLAUDICATION: 0
COUGH: 0
SORE THROAT: 0
ORTHOPNEA: 0
EYE DISCHARGE: 0
CHILLS: 0
HEADACHES: 0
BLURRED VISION: 0
DIARRHEA: 0
SPEECH CHANGE: 0
SENSORY CHANGE: 0
HEARTBURN: 0
WEAKNESS: 1
PHOTOPHOBIA: 0
NAUSEA: 0
WHEEZING: 0
FLANK PAIN: 0
SHORTNESS OF BREATH: 1
DOUBLE VISION: 0
VOMITING: 0
FEVER: 0
SPUTUM PRODUCTION: 0
DIZZINESS: 0
BLOOD IN STOOL: 0
DEPRESSION: 0
BACK PAIN: 0
MYALGIAS: 0
BRUISES/BLEEDS EASILY: 0
FALLS: 1
PALPITATIONS: 0

## 2021-05-12 ASSESSMENT — GAIT ASSESSMENTS
DISTANCE (FEET): 12
GAIT LEVEL OF ASSIST: MINIMAL ASSIST
DEVIATION: SHUFFLED GAIT;INCREASED BASE OF SUPPORT

## 2021-05-12 ASSESSMENT — PAIN DESCRIPTION - PAIN TYPE
TYPE: ACUTE PAIN
TYPE: ACUTE PAIN

## 2021-05-12 NOTE — THERAPY
Physical Therapy   Daily Treatment     Patient Name: Lorene Haro  Age:  45 y.o., Sex:  female  Medical Record #: [id number]  Today's Date: 5/12/2021     Precautions: Fall Risk    Assessment    Pt was pleasant, emotionally labile, requiring encouragement. Pt required Donnie to reach EOB with bed features. She required Donnie for STS with no AD. She reports pain in RUE and limited by pain at this time. She ambulated 12ft 2xm with no AD and Donnie. She presents with wide base of support and decreased heel to toe gait pattern. Encouraged to continue to increase mobility while in house. BP at 119/64 and no symptoms this afternoon.     Plan    Continue current treatment plan.    DC Equipment Recommendations: Unable to determine at this time  Discharge Recommendations: Recommend post-acute placement for additional physical therapy services prior to discharge home       05/12/21 2210   Other Treatments   Other Treatments Provided BP at 119/64,  educated on increasing mobility and energy conservation    Balance   Sitting Balance (Static) Fair   Sitting Balance (Dynamic) Fair -   Standing Balance (Static) Fair   Standing Balance (Dynamic) Fair -   Weight Shift Sitting Fair   Weight Shift Standing Fair   Comments with no ad    Gait Analysis   Gait Level Of Assist Minimal Assist   Assistive Device None   Distance (Feet) 12   # of Times Distance was Traveled 2   Deviation Shuffled Gait;Increased Base Of Support   Comments limited due to pain in RUE, pt preferring not use it at this time.    Bed Mobility    Supine to Sit Minimal Assist   Sit to Supine   (left up in chair )   Scooting Supervised   Comments up in chair post tx session. Utilized Donnie for bed mobility with bed features    Functional Mobility   Sit to Stand Minimal Assist   Bed, Chair, Wheelchair Transfer Minimal Assist   Toilet Transfers Minimal Assist   Skilled Intervention Verbal Cuing   Comments no AD, hands on for safety and vc for sequencing    Short Term Goals     Short Term Goal # 1 pt will go supine<>sit w/hob flat and rails down I in 6tx for safe d/c home    Goal Outcome # 1 goal not met   Short Term Goal # 2 pt will go sit<>stand w/fww w/ SPV in 6tx for safe d/c home    Goal Outcome # 2 Goal not met   Short Term Goal # 3 pt will transfer bed<>chair w/fww w/SPV in 6tx for safe d/c home    Goal Outcome # 3 Goal not met   Short Term Goal # 4 pt will ambulate for 150ft w/fww w/SPV in 6tx for safe d/c home    Goal Outcome # 4 Goal not met

## 2021-05-12 NOTE — THERAPY
"Occupational Therapy  Daily Treatment     Patient Name: Lorene Haro  Age:  45 y.o., Sex:  female  Medical Record #: [id number]  Today's Date: 5/12/2021     Precautions  Precautions: Fall Risk    Assessment  Pt seen for OT tx pt demonstrating poor participation in self care pt limited by c/o dizziness and pain in RUE. Pt sat EOB but continued to c/o dizziness. Attempted to check BP, but machine was broken and could not locate another device Rn and CNA notified. Pt BTB reporting symptom improvement. Provided w/pillows to elevate swollen RUE and hot pack. Given pts limited particiaptin in ability to care for herself recommend post acute placement     Plan  Continue current treatment plan.    DC Equipment Recommendations: Tub Transfer Bench, Unable to determine at this time  Discharge Recommendations: Recommend post-acute placement for additional occupational therapy services prior to discharge home    Subjective  \"My arm and hand hurt a lot I think its from the iv meds\"     Objective     05/12/21 0936   Pain 0 - 10 Group   Therapist Pain Assessment During Activity;Nurse Notified  (c/o RUE hand and arm pain )   Cognition    Cognition / Consciousness X   Level of Consciousness Alert   Comments self limiting c/o pain and dizziness extra encouragement to participate    Passive ROM Upper Body   Passive ROM Upper Body X   Comments increased edema in RUE distally    Active ROM Upper Body   Active ROM Upper Body  X   Dominant Hand Right   Comments limited by pain distally    Strength Upper Body   Upper Body Strength  X   Comments RUE impaired    Sensation Upper Body   Upper Extremity Sensation  Not Tested   Upper Body Muscle Tone   Upper Body Muscle Tone  Not Tested   Other Treatments   Other Treatments Provided Attempted to encourage pt particiapte in self cares EOB but pt had constant c/o dizziness and RUE pain. Sat up breifly than laid back down    Balance   Sitting Balance (Static) Fair   Sitting Balance (Dynamic) Fair " "-   Skilled Intervention Verbal Cuing;Tactile Cuing;Postural Facilitation;Facilitation;Compensatory Strategies   Bed Mobility    Supine to Sit Moderate Assist   Sit to Supine Minimal Assist   Skilled Intervention Verbal Cuing;Tactile Cuing;Facilitation;Compensatory Strategies   Activities of Daily Living   Grooming Moderate Assist;Seated   Upper Body Dressing Moderate Assist   Lower Body Dressing Maximal Assist   Skilled Intervention Verbal Cuing;Tactile Cuing;Compensatory Strategies;Facilitation   How much help from another person does the patient currently need...   6 Clicks Daily Activity Score 13   Functional Mobility   Sit to Stand Unable to Participate   Bed, Chair, Wheelchair Transfer Unable to Participate   Toilet Transfers Unable to Participate   Mobility EOB    Skilled Intervention Verbal Cuing;Tactile Cuing;Facilitation   Visual Perception   Visual Perception  Not Tested   Activity Tolerance   Comments c/o pain and dizziness unable to collect vitals d/t no access to machine    Patient / Family Goals   Patient / Family Goal #1 \"To be able to take a shower\"   Goal #1 Outcome Goal not met   Short Term Goals   Short Term Goal # 1 Pt will complete standing grooming at sink with supv    Goal Outcome # 1 Goal not met   Short Term Goal # 2 Pt will complete LB dressing with supv using AE   Goal Outcome # 2 Goal not met   Short Term Goal # 3 Pt will stand >15 min with supv to complete ADL without rest break    Goal Outcome # 3 Goal not met   Education Group   Role of Occupational Therapist Patient Response Patient;Acceptance;Explanation;Verbal Demonstration   Anticipated Discharge Equipment and Recommendations   DC Equipment Recommendations Tub Transfer Bench;Unable to determine at this time   Discharge Recommendations Recommend post-acute placement for additional occupational therapy services prior to discharge home   Interdisciplinary Plan of Care Collaboration   IDT Collaboration with  Nursing   Patient Position " at End of Therapy In Bed;Call Light within Reach;Tray Table within Reach;Phone within Reach   Collaboration Comments RN aware of OT tx and pts efforts    Session Information   Date / Session Number  5/12 #2 (2/3, 5/15)   Priority 2

## 2021-05-12 NOTE — DISCHARGE PLANNING
Met with pt at bedside for discharge planning assessment. Pt confirms information on facesheet to be correct.     She reports that she lives alone. Her sister and mother live locally. Her pcp is Sheridan Dinh and she last saw her April 2021.     [facility] prefers the [facility].     Her oxygen provider is [employer] and she owns a FWW that is at bedside.         Care Transition Team Assessment    Information Source  Orientation Level: Oriented X4  Information Given By: Patient  Who is responsible for making decisions for patient? : Patient    Elopement Risk  Legal Hold: No  Ambulatory or Self Mobile in Wheelchair: Yes  Disoriented: No  Psychiatric Symptoms: None  History of Wandering: No  Elopement this Admit: No  Vocalizing Wanting to Leave: No  Displays Behaviors, Body Language Wanting to Leave: No-Not at Risk for Elopement  Elopement Risk: Not at Risk for Elopement    Interdisciplinary Discharge Planning  Lives with - Patient's Self Care Capacity: Unrelated Adult  Patient or legal guardian wants to designate a caregiver: No  Housing / Facility: 2 Story Apartment / Condo  Prior Services: Intermittent Physical Support for ADL Per Service    Discharge Preparedness  What is your plan after discharge?: Skilled nursing facility  What are your discharge supports?: Parent, Sibling  Prior Functional Level: Needs Assist with Activities of Daily Living  Difficulity with ADLs: Bathing, Dressing, Toileting, Walking    Functional Assesment  Prior Functional Level: Needs Assist with Activities of Daily Living    Finances  Financial Barriers to Discharge: No  Prescription Coverage: Yes        Domestic Abuse  Have you ever been the victim of abuse or violence?: No  Physical Abuse or Sexual Abuse: No  Verbal Abuse or Emotional Abuse: No  Possible Abuse/Neglect Reported to:: Not Applicable      Discharge Risks or Barriers  Discharge risks or barriers?: Complex medical needs  Patient risk factors: Bariatric,  /  physical deficit, Complex medical needs, Vulnerable adult    Anticipated Discharge Information  Discharge Disposition: Still a Patient (30)  Discharge Address: [address] [place] [address] [id number]  Discharge Contact Phone Number: [phone]

## 2021-05-12 NOTE — DISCHARGE PLANNING
Anticipated Discharge Disposition: snf for rehab     Action: Met with patient to get choice for SNF. Patient chose:     Pt okay with blanket referral to local SNF facilities.     Faxed to [facility].     Pt states that due to her housing situation, if she is away from her apt for more than 30 days, she is at risk of losing her apt.       Barriers to Discharge: accepting snf, bed availability     Plan: LSW to assist as needed and monitor for barriers to discharge.

## 2021-05-12 NOTE — CARE PLAN
Problem: Safety  Goal: Will remain free from injury  Outcome: PROGRESSING AS EXPECTED     Problem: Knowledge Deficit  Goal: Knowledge of disease process/condition, treatment plan, diagnostic tests, and medications will improve  Outcome: PROGRESSING AS EXPECTED     Problem: Pain Management  Goal: Pain level will decrease to patient's comfort goal  Outcome: PROGRESSING AS EXPECTED     Problem: Skin Integrity  Goal: Risk for impaired skin integrity will decrease  Outcome: PROGRESSING AS EXPECTED     Problem: Mobility  Goal: Risk for activity intolerance will decrease  Outcome: PROGRESSING AS EXPECTED

## 2021-05-12 NOTE — PROGRESS NOTES
Assumed care of PT A&O 4. Pt sitting in chair no signs of labored breathing. On RA. Call light within reach. Pt was updated on plan of care for the night.

## 2021-05-12 NOTE — PROGRESS NOTES
Inpatient Anticoagulation Service Note    Date: 5/12/2021    Reason for Anticoagulation: Atrial Fibrillation   Target INR: 2.0 to 3.0  [id number] VASc Score: 1  HAS-BLED Score: 1   Hemoglobin Value: (!) 11.3  Hematocrit Value: 37  Lab Platelet Value: 231    INR from last 7 days     Date/Time INR Value    05/12/21 03:41:01  (!) 1.52    05/11/21 0339  (!) 1.48    05/10/21 05:42:01  (!) 1.39    05/09/21 0309  (!) 1.64    05/08/21 2153  (!) 1.62        Dose from last 7 days     Date/Time Dose (mg)    05/12/21 1622  6    05/11/21 1417  8    05/10/21 1442  6    05/09/21 0158  6        Average Dose (mg): 6  Significant Interactions: Statin, Corticosteroids  Bridge Therapy: No    Comments: INR up after larger dose of warfarin 8 mg yesterday. Will resume the pt's home warfarin dosing of 6 mg daily. Trend INR for now    Education Material Provided?: No (on chronic therapy as outpatient)  Pharmacist suggested discharge dosing: warfarin 6 mg daily (home dose) and a follow up INR within 2-3 days of discharge     Sony Mullins, PharmD

## 2021-05-12 NOTE — PROGRESS NOTES
Assumed care of PT A&O 4. Pt resting in bed with no signs of labored breathing. On 2 liters NC. Pt is medical. Call light within reach, bed in lowest position, upper bed rails up. Pt was updated on plan of care for the day. Will continue to monitor.

## 2021-05-12 NOTE — PROGRESS NOTES
[facility] Medicine Daily Progress Note    Date of Service  5/12/2021    Chief Complaint  45 y.o. female admitted 5/8/2021 with shortness of breath     Hospital Course    The patient is a 45-year-old female with a past medical history of COPD on 3L/min, schizophrenia, bipolar disorder, and morbid obesity who presents to the ED with a chief complaint of worsening shortness of breath.    D-dimer was not elevated   NT proBNP 457. Procal neg  Patient likely with COPD exacerbation/KATHIA/OHS. Still smoking.   PT/OT rec post acute       Interval Problem Update  The patient was seen and evaluated at bedside and appears comfortable. She reported RUE swelling and pain. Doppler ordered.  On 2L O2, which is her baseline O2    All Data, Medication data reviewed.  Case discussed with nursing, CM,  nurse, pharmacy in IDT rounds.   Difficult placement for SNF vs. C due to insurance issue    Consultants/Specialty  None    Code Status  Full Code    Disposition  Pending placement to SNF vs C    Review of Systems  Review of Systems   Constitutional: Positive for malaise/fatigue. Negative for chills and fever.   HENT: Negative for congestion, hearing loss, sore throat and tinnitus.    Eyes: Negative for blurred vision, double vision, photophobia and discharge.   Respiratory: Positive for shortness of breath. Negative for cough, hemoptysis, sputum production and wheezing.    Cardiovascular: Negative for chest pain, palpitations, orthopnea, claudication and leg swelling.   Gastrointestinal: Negative for abdominal pain, blood in stool, diarrhea, heartburn, melena, nausea and vomiting.   Genitourinary: Negative for dysuria, flank pain, hematuria and urgency.   Musculoskeletal: Positive for falls and joint pain (RUE pain and swelling). Negative for back pain and myalgias.   Skin: Negative for itching and rash.   Neurological: Positive for weakness. Negative for dizziness, sensory change, speech change and headaches.    Endo/Heme/Allergies: Does not bruise/bleed easily.   Psychiatric/Behavioral: Negative for depression and suicidal ideas.        Physical Exam  Temp:  [35.8 °C (96.5 °F)-36.3 °C (97.3 °F)] 36.2 °C (97.2 °F)  Pulse:  [51-63] 63  Resp:  [18-21] 20  BP: (104-134)/(44-63) 134/58  SpO2:  [93 %-98 %] 97 %    Physical Exam  Vitals and nursing note reviewed.   Constitutional:       General: She is not in acute distress.     Appearance: Normal appearance. She is obese.   HENT:      Head: Normocephalic and atraumatic.      Mouth/Throat:      Mouth: Mucous membranes are moist.   Eyes:      Extraocular Movements: Extraocular movements intact.      Conjunctiva/sclera: Conjunctivae normal.      Pupils: Pupils are equal, round, and reactive to light.   Cardiovascular:      Rate and Rhythm: Normal rate and regular rhythm.      Heart sounds: No murmur heard.   No friction rub.   Pulmonary:      Effort: Pulmonary effort is normal. No respiratory distress.      Breath sounds: Normal breath sounds. No wheezing.      Comments: On oxygen supplements  Abdominal:      General: Abdomen is flat. Bowel sounds are normal.      Palpations: Abdomen is soft.      Tenderness: There is no abdominal tenderness. There is no guarding.   Musculoskeletal:         General: Swelling (RUE) present. No tenderness. Normal range of motion.      Cervical back: Normal range of motion and neck supple.   Skin:     General: Skin is warm and dry.      Findings: No rash.   Neurological:      General: No focal deficit present.      Mental Status: She is alert and oriented to person, place, and time.      Cranial Nerves: No cranial nerve deficit.      Motor: No weakness.   Psychiatric:         Mood and Affect: Mood normal.         Behavior: Behavior normal.         Fluids    Intake/Output Summary (Last 24 hours) at 5/12/2021 1117  Last data filed at 5/12/2021 1100  Gross per 24 hour   Intake 240 ml   Output 2200 ml   Net -1960 ml       Laboratory  Recent Labs      05/10/21  0542 05/12/21  0341   WBC 13.2* 13.4*   RBC 3.98* 4.02*   HEMOGLOBIN 11.1* 11.3*   HEMATOCRIT 36.9* 37.0   MCV 92.7 92.0   MCH 27.9 28.1   MCHC 30.1* 30.5*   RDW 56.5* 57.1*   PLATELETCT 279 231   MPV 11.2 10.9     Recent Labs     05/10/21  0542 05/12/21  0341   SODIUM 136 139   POTASSIUM 4.4 3.7   CHLORIDE 100 101   CO2 32 31   GLUCOSE 169* 93   BUN 17 27*   CREATININE 0.96 1.05   CALCIUM 9.6 9.1     Recent Labs     05/10/21  0542 05/11/21  0339 05/12/21  0341   INR 1.39* 1.48* 1.52*         Recent Labs     05/10/21  0542   TRIGLYCERIDE 126   HDL 48   *       Imaging  DX-CHEST-PORTABLE (1 VIEW)   Final Result         1. No acute cardiopulmonary abnormalities are identified.      US-EXTREMITY VENOUS UPPER UNILAT RIGHT    (Results Pending)        Assessment/Plan  * Acute on chronic respiratory failure (HCC)- (present on admission)  Assessment & Plan  Multifactorial, likely sec 2/2 copd and obesity hypoventilation syndrome, KATHIA  's  Dimer negative  Obtain procal neg  Cont duonebs, home inhalers, and steroids and O2 support  IS    KATHIA and COPD overlap syndrome (HCC)- (present on admission)  Assessment & Plan  With COPD exacerbation this admission  Continue home inhalers  Pulse ox and oxygen support, 3L required in ED.  Maintain >93%  Smoking cessation counseling 12 minutes  Consider nightly cpap  Most likely copd exacerbation: duonebs, home inhalers, and solumedrol 62.5mb BID ordered, taper steroids and O2 support commensurate with recovery.        Elevated brain natriuretic peptide (BNP) level- (present on admission)  Assessment & Plan  Chronic  >500 upon this admission. Ranges from 200-500 on previous labs within past year.  Low suspicion for CHF  Last Echo performed 3/20/21: Prior echocardiogram 1/18/2021. No changes.  Normal left ventricular systolic function. Left ventricular ejection fraction is visually estimated to be 60%. The right ventricle was normal in size and function.  On lasix      Morbid obesity (HCC)- (present on admission)  Assessment & Plan  Continue to  on lifestyle modifications to include diet and exercise. Outpatient bariatric surgery evaluation recommended    Chest pain, rule out acute myocardial infarction- (present on admission)  Assessment & Plan  Trop trending neg  EKG: NSR, no acute ST-T changes  Reports chest pain resolved    Leukocytosis- (present on admission)  Assessment & Plan  Procal neg.   Mild, Likely reactive, sec to steroid use  Cont to monitor    Bipolar Disorder- (present on admission)  Assessment & Plan  Continue home medications     Yeast infection of the skin- (present on admission)  Assessment & Plan  Continue nystatin to intertriginous areas including under breasts and crux    Paroxysmal atrial fibrillation (HCC)- (present on admission)  Assessment & Plan  On coumadin, will continue with Coumadin. I coordinate with pharmacy for coumadin dosing. INR target 2-3. Closely monitor any sign of bleeding.  Presumed inr goal of 2-3.  Daily INRs        Schizophrenia (HCC)- (present on admission)  Assessment & Plan  Lithium level in AM  Cont. 300mg in AM, 600mg qhs unless levels high  Cont. abilify 30mg qam    Tobacco use- (present on admission)  Assessment & Plan  5 minutes on tobacco cessation counseling provided including nicotine patches, gum, and dangers of smoking. Discussed the risks of smoking including increased risk of heart disease, stroke, cancer and COPD. Discussed the benefits of quitting smoking.       Iron deficiency anemia- (present on admission)  Assessment & Plan  Iron studies and ferriting, c/w ROCÍO  Denies active bleeding  Cont iron supplements       VTE prophylaxis: warfarin

## 2021-05-13 ENCOUNTER — APPOINTMENT (OUTPATIENT)
Dept: RADIOLOGY | Facility: MEDICAL CENTER | Age: 46
End: 2021-05-13
Attending: STUDENT IN AN ORGANIZED HEALTH CARE EDUCATION/TRAINING PROGRAM
Payer: MEDICAID

## 2021-05-13 LAB
GLUCOSE BLD-MCNC: 116 MG/DL (ref 65–99)
GLUCOSE BLD-MCNC: 117 MG/DL (ref 65–99)
GLUCOSE BLD-MCNC: 172 MG/DL (ref 65–99)
GLUCOSE BLD-MCNC: 183 MG/DL (ref 65–99)
GLUCOSE BLD-MCNC: 96 MG/DL (ref 65–99)
INR PPP: 1.68 (ref 0.87–1.13)
PROTHROMBIN TIME: 20.3 SEC (ref 12–14.6)

## 2021-05-13 PROCEDURE — 94660 CPAP INITIATION&MGMT: CPT

## 2021-05-13 PROCEDURE — 36415 COLL VENOUS BLD VENIPUNCTURE: CPT

## 2021-05-13 PROCEDURE — A9270 NON-COVERED ITEM OR SERVICE: HCPCS | Performed by: STUDENT IN AN ORGANIZED HEALTH CARE EDUCATION/TRAINING PROGRAM

## 2021-05-13 PROCEDURE — 85610 PROTHROMBIN TIME: CPT

## 2021-05-13 PROCEDURE — 94760 N-INVAS EAR/PLS OXIMETRY 1: CPT

## 2021-05-13 PROCEDURE — 700102 HCHG RX REV CODE 250 W/ 637 OVERRIDE(OP): Performed by: STUDENT IN AN ORGANIZED HEALTH CARE EDUCATION/TRAINING PROGRAM

## 2021-05-13 PROCEDURE — 700111 HCHG RX REV CODE 636 W/ 250 OVERRIDE (IP): Performed by: STUDENT IN AN ORGANIZED HEALTH CARE EDUCATION/TRAINING PROGRAM

## 2021-05-13 PROCEDURE — 82962 GLUCOSE BLOOD TEST: CPT | Mod: 91

## 2021-05-13 PROCEDURE — 96372 THER/PROPH/DIAG INJ SC/IM: CPT

## 2021-05-13 PROCEDURE — 94640 AIRWAY INHALATION TREATMENT: CPT

## 2021-05-13 PROCEDURE — 93971 EXTREMITY STUDY: CPT | Mod: RT

## 2021-05-13 PROCEDURE — G0378 HOSPITAL OBSERVATION PER HR: HCPCS

## 2021-05-13 PROCEDURE — 99226 PR SUBSEQUENT OBSERVATION CARE,LEVEL III: CPT | Performed by: STUDENT IN AN ORGANIZED HEALTH CARE EDUCATION/TRAINING PROGRAM

## 2021-05-13 RX ORDER — DEXTROSE MONOHYDRATE 25 G/50ML
50 INJECTION, SOLUTION INTRAVENOUS
Status: DISCONTINUED | OUTPATIENT
Start: 2021-05-13 | End: 2021-05-18 | Stop reason: HOSPADM

## 2021-05-13 RX ADMIN — ACETAMINOPHEN 650 MG: 325 TABLET, FILM COATED ORAL at 00:35

## 2021-05-13 RX ADMIN — NYSTATIN 100000 UNITS: 100000 POWDER TOPICAL at 17:20

## 2021-05-13 RX ADMIN — GUAIFENESIN AND DEXTROMETHORPHAN 10 ML: 100; 10 SYRUP ORAL at 20:28

## 2021-05-13 RX ADMIN — LITHIUM CARBONATE 300 MG: 300 TABLET, FILM COATED, EXTENDED RELEASE ORAL at 05:02

## 2021-05-13 RX ADMIN — TIOTROPIUM BROMIDE INHALATION SPRAY 5 MCG: 3.12 SPRAY, METERED RESPIRATORY (INHALATION) at 06:57

## 2021-05-13 RX ADMIN — POTASSIUM CHLORIDE 20 MEQ: 1500 TABLET, EXTENDED RELEASE ORAL at 05:02

## 2021-05-13 RX ADMIN — FUROSEMIDE 20 MG: 40 TABLET ORAL at 18:00

## 2021-05-13 RX ADMIN — FERROUS SULFATE TAB 325 MG (65 MG ELEMENTAL FE) 325 MG: 325 (65 FE) TAB at 08:36

## 2021-05-13 RX ADMIN — WARFARIN SODIUM 6 MG: 6 TABLET ORAL at 17:21

## 2021-05-13 RX ADMIN — FUROSEMIDE 20 MG: 40 TABLET ORAL at 08:36

## 2021-05-13 RX ADMIN — LITHIUM CARBONATE 600 MG: 300 TABLET, FILM COATED, EXTENDED RELEASE ORAL at 17:21

## 2021-05-13 RX ADMIN — FLUTICASONE PROPIONATE 50 MCG: 50 SPRAY, METERED NASAL at 14:25

## 2021-05-13 RX ADMIN — NYSTATIN 100000 UNITS: 100000 POWDER TOPICAL at 05:02

## 2021-05-13 RX ADMIN — MONTELUKAST 10 MG: 10 TABLET, FILM COATED ORAL at 20:28

## 2021-05-13 RX ADMIN — PREDNISONE 40 MG: 20 TABLET ORAL at 05:01

## 2021-05-13 RX ADMIN — ACETAMINOPHEN 650 MG: 325 TABLET, FILM COATED ORAL at 11:54

## 2021-05-13 RX ADMIN — FLUTICASONE PROPIONATE 50 MCG: 50 SPRAY, METERED NASAL at 05:02

## 2021-05-13 RX ADMIN — ATORVASTATIN CALCIUM 20 MG: 20 TABLET, FILM COATED ORAL at 18:00

## 2021-05-13 RX ADMIN — ARIPIPRAZOLE 30 MG: 10 TABLET ORAL at 05:01

## 2021-05-13 ASSESSMENT — ENCOUNTER SYMPTOMS
CHILLS: 0
NAUSEA: 0
WHEEZING: 0
COUGH: 0
HEMOPTYSIS: 0
PHOTOPHOBIA: 0
SENSORY CHANGE: 0
SHORTNESS OF BREATH: 1
MYALGIAS: 0
SPUTUM PRODUCTION: 0
ABDOMINAL PAIN: 0
VOMITING: 0
EYE DISCHARGE: 0
DIZZINESS: 0
PALPITATIONS: 0
BLURRED VISION: 0
SPEECH CHANGE: 0
HEARTBURN: 0
BLOOD IN STOOL: 0
BRUISES/BLEEDS EASILY: 0
FALLS: 1
BACK PAIN: 0
HEADACHES: 0
FLANK PAIN: 0
CLAUDICATION: 0
WEAKNESS: 1
DEPRESSION: 0
DOUBLE VISION: 0
SORE THROAT: 0
FEVER: 0
DIARRHEA: 0
ORTHOPNEA: 0

## 2021-05-13 ASSESSMENT — PAIN DESCRIPTION - PAIN TYPE
TYPE: ACUTE PAIN

## 2021-05-13 NOTE — PROGRESS NOTES
Assumed care this pm from day shift RN. Patient resting in bed, responds to verbal stimulus. Patient AxO 4, O2 @ 2 L thru nasal cannula, VSS. Call bell and personal items within reach, bed locked in low position. Bed exit alarm on. Hourly rounding in place. Tele box in place, monitor room notified.

## 2021-05-13 NOTE — FACE TO FACE
Face to Face Supporting Documentation - Home Health    The encounter with this patient was in whole or in part the primary reason for home health admission.    Date of encounter:   Patient:                    MRN:                       YOB: 2023  Lorene Haro  [id number]  1975     Home health to see patient for:  Skilled Nursing care for assessment, interventions & education    Skilled need for:  Exacerbation of Chronic Disease State acute on chronic respiratory failure    Skilled nursing interventions to include:  Comment: debility    Homebound status evidenced by:  Need the aid of supportive devices such as crutches, canes, wheelchairs or walkers. Leaving home requires a considerable and taxing effort. There is a normal inability to leave the home.    Community Physician to provide follow up care: SHANKAR Gleason     Optional Interventions? No      I certify the face to face encounter for this home health care referral meets the CMS requirements and the encounter/clinical assessment with the patient was, in whole, or in part, for the medical condition(s) listed above, which is the primary reason for home health care. Based on my clinical findings: the service(s) are medically necessary, support the need for home health care, and the homebound criteria are met.  I certify that this patient has had a face to face encounter by myself.  Romy Gallardo M.D. - NPI: [id number]

## 2021-05-13 NOTE — PROGRESS NOTES
Inpatient Anticoagulation Service Note    Date: 5/13/2021    Reason for Anticoagulation: Atrial Fibrillation   Target INR: 2.0 to 3.0  [id number] VASc Score: 1  HAS-BLED Score: 1   Hemoglobin Value: (!) 11.3  Hematocrit Value: 37  Lab Platelet Value: 231    INR from last 7 days     Date/Time INR Value    05/13/21 0908  (!) 1.68    05/12/21 03:41:01  (!) 1.52    05/11/21 0339  (!) 1.48    05/10/21 05:42:01  (!) 1.39    05/09/21 0309  (!) 1.64    05/08/21 2153  (!) 1.62        Dose from last 7 days     Date/Time Dose (mg)    05/13/21 1334  6    05/12/21 1622  6    05/11/21 1417  8    05/10/21 1442  6    05/09/21 0158  6        Average Dose (mg): 6  Significant Interactions: Statin, Corticosteroids  Bridge Therapy: No     Comments: INR trending up. Will continue home warfarin dose of 6 mg daily and keep trending the INR    Education Material Provided?: No (on chronic therapy as outpatient)  Pharmacist suggested discharge dosing: home dose of warfarin 6 mg daily and a follow up INR within 2-3 days of discharge     Sony Mullins, PharmD

## 2021-05-13 NOTE — PALLIATIVE CARE
Palliative Care follow-up  Advance directive completed by seema and copy scanned into EMR (to locate the AD/POLST, please hover the cursor over the patient's code status to find all linked ACP documents). Original and additional copy returned to patient and placed in her bag of belongings at her request. She denied any needs/questions at this time.       Plan: support and assist with POC as needed    Thank you for allowing Palliative Care to support this patient and family. Contact [phone] for additional assistance, change in patient status, or with any questions/concerns.

## 2021-05-13 NOTE — DISCHARGE PLANNING
Received Choice form at 0750  Agency/Facility Name: 1)[facility],2)[place],3)[facility],4)[place],5)[place].6)[facility],7)[facility],8)[facility]  Referral sent per Choice form at 0750

## 2021-05-13 NOTE — CARE PLAN
The patient is Stable - Low risk of patient condition declining or worsening         Summary of progress made towards problems/goals:  Pt education, medications given.

## 2021-05-13 NOTE — PROGRESS NOTES
[facility] Medicine Daily Progress Note    Date of Service  5/13/2021    Chief Complaint  45 y.o. female admitted 5/8/2021 with shortness of breath     Hospital Course    The patient is a 45-year-old female with a past medical history of COPD on 3L/min, schizophrenia, bipolar disorder, and morbid obesity who presents to the ED with a chief complaint of worsening shortness of breath.    D-dimer was not elevated   NT proBNP 457. Procal neg  Patient likely with COPD exacerbation/KATHIA/OHS. Still smoking.   RUE swelling, pain. Doppler: no DVT.  PT/OT rec post acute   [facility] ordered    Interval Problem Update  The patient was seen and evaluated at bedside and appears comfortable. She reported RUE swelling and pain. No DVT on doppler.  On 2L O2, which is her baseline O2    All Data, Medication data reviewed.  Case discussed with nursing, CM,  nurse, pharmacy in IDT rounds.   Difficult placement for SNF vs. C due to insurance issue    Consultants/Specialty  None    Code Status  Full Code    Disposition  Pending placement to SNF vs C    Review of Systems  Review of Systems   Constitutional: Positive for malaise/fatigue. Negative for chills and fever.   HENT: Negative for congestion, hearing loss, sore throat and tinnitus.    Eyes: Negative for blurred vision, double vision, photophobia and discharge.   Respiratory: Positive for shortness of breath. Negative for cough, hemoptysis, sputum production and wheezing.    Cardiovascular: Negative for chest pain, palpitations, orthopnea, claudication and leg swelling.   Gastrointestinal: Negative for abdominal pain, blood in stool, diarrhea, heartburn, melena, nausea and vomiting.   Genitourinary: Negative for dysuria, flank pain, hematuria and urgency.   Musculoskeletal: Positive for falls and joint pain (RUE pain and swelling). Negative for back pain and myalgias.   Skin: Negative for itching and rash.   Neurological: Positive for weakness. Negative for dizziness, sensory  change, speech change and headaches.   Endo/Heme/Allergies: Does not bruise/bleed easily.   Psychiatric/Behavioral: Negative for depression and suicidal ideas.        Physical Exam  Temp:  [35.9 °C (96.6 °F)-36.5 °C (97.7 °F)] 36.4 °C (97.5 °F)  Pulse:  [52-76] 66  Resp:  [17-20] 18  BP: ()/(36-66) 111/60  SpO2:  [91 %-97 %] 95 %    Physical Exam  Vitals and nursing note reviewed.   Constitutional:       General: She is not in acute distress.     Appearance: Normal appearance. She is obese.   HENT:      Head: Normocephalic and atraumatic.      Mouth/Throat:      Mouth: Mucous membranes are moist.   Eyes:      Extraocular Movements: Extraocular movements intact.      Conjunctiva/sclera: Conjunctivae normal.      Pupils: Pupils are equal, round, and reactive to light.   Cardiovascular:      Rate and Rhythm: Normal rate and regular rhythm.      Heart sounds: No murmur heard.   No friction rub.   Pulmonary:      Effort: Pulmonary effort is normal. No respiratory distress.      Breath sounds: Normal breath sounds. No wheezing.      Comments: On oxygen supplements  Abdominal:      General: Abdomen is flat. Bowel sounds are normal.      Palpations: Abdomen is soft.      Tenderness: There is no abdominal tenderness. There is no guarding.   Musculoskeletal:         General: Swelling (RUE) present. No tenderness. Normal range of motion.      Cervical back: Normal range of motion and neck supple.   Skin:     General: Skin is warm and dry.      Findings: No rash.   Neurological:      General: No focal deficit present.      Mental Status: She is alert and oriented to person, place, and time.      Cranial Nerves: No cranial nerve deficit.      Motor: No weakness.   Psychiatric:         Mood and Affect: Mood normal.         Behavior: Behavior normal.         Fluids    Intake/Output Summary (Last 24 hours) at 5/13/2021 1123  Last data filed at 5/13/2021 0858  Gross per 24 hour   Intake 480 ml   Output 2000 ml   Net -1520 ml        Laboratory  Recent Labs     05/12/21  0341   WBC 13.4*   RBC 4.02*   HEMOGLOBIN 11.3*   HEMATOCRIT 37.0   MCV 92.0   MCH 28.1   MCHC 30.5*   RDW 57.1*   PLATELETCT 231   MPV 10.9     Recent Labs     05/12/21  0341   SODIUM 139   POTASSIUM 3.7   CHLORIDE 101   CO2 31   GLUCOSE 93   BUN 27*   CREATININE 1.05   CALCIUM 9.1     Recent Labs     05/11/21  0339 05/12/21  0341 05/13/21  0908   INR 1.48* 1.52* 1.68*               Imaging  US-EXTREMITY VENOUS UPPER UNILAT RIGHT   Final Result      DX-CHEST-PORTABLE (1 VIEW)   Final Result         1. No acute cardiopulmonary abnormalities are identified.           Assessment/Plan  * Acute on chronic respiratory failure (HCC)- (present on admission)  Assessment & Plan  Multifactorial, likely sec 2/2 copd and obesity hypoventilation syndrome, KATHIA  's  Dimer negative  Obtain procal neg  Cont duonebs, home inhalers, and steroids and O2 support  IS    KATHIA and COPD overlap syndrome (HCC)- (present on admission)  Assessment & Plan  With COPD exacerbation this admission  Continue home inhalers  Pulse ox and oxygen support, 3L required in ED.  Maintain >93%  Smoking cessation counseling 12 minutes  Consider nightly cpap  Most likely copd exacerbation: duonebs, home inhalers, and solumedrol 62.5mb BID ordered, taper steroids and O2 support commensurate with recovery.        Elevated brain natriuretic peptide (BNP) level- (present on admission)  Assessment & Plan  Chronic  >500 upon this admission. Ranges from 200-500 on previous labs within past year.  Low suspicion for CHF  Last Echo performed 3/20/21: Prior echocardiogram 1/18/2021. No changes.  Normal left ventricular systolic function. Left ventricular ejection fraction is visually estimated to be 60%. The right ventricle was normal in size and function.  On lasix     Morbid obesity (HCC)- (present on admission)  Assessment & Plan  Continue to  on lifestyle modifications to include diet and exercise. Outpatient  bariatric surgery evaluation recommended    Chest pain, rule out acute myocardial infarction- (present on admission)  Assessment & Plan  Trop trending neg  EKG: NSR, no acute ST-T changes  Reports chest pain resolved    Leukocytosis- (present on admission)  Assessment & Plan  Procal neg.   Mild, Likely reactive, sec to steroid use  Cont to monitor    Bipolar Disorder- (present on admission)  Assessment & Plan  Continue home medications     Yeast infection of the skin- (present on admission)  Assessment & Plan  Continue nystatin to intertriginous areas including under breasts and crux    Paroxysmal atrial fibrillation (HCC)- (present on admission)  Assessment & Plan  On coumadin, will continue with Coumadin. I coordinate with pharmacy for coumadin dosing. INR target 2-3. Closely monitor any sign of bleeding.  Presumed inr goal of 2-3.  Daily INRs        Schizophrenia (HCC)- (present on admission)  Assessment & Plan  Lithium level in AM  Cont. 300mg in AM, 600mg qhs unless levels high  Cont. abilify 30mg qam    Tobacco use- (present on admission)  Assessment & Plan  5 minutes on tobacco cessation counseling provided including nicotine patches, gum, and dangers of smoking. Discussed the risks of smoking including increased risk of heart disease, stroke, cancer and COPD. Discussed the benefits of quitting smoking.       Iron deficiency anemia- (present on admission)  Assessment & Plan  Iron studies and ferriting, c/w ROCÍO  Denies active bleeding  Cont iron supplements       VTE prophylaxis: warfarin

## 2021-05-13 NOTE — CARE PLAN
Problem: Communication  Goal: The ability to communicate needs accurately and effectively will improve  Outcome: Progressing     Problem: Pain Management  Goal: Pain level will decrease to patient's comfort goal  Outcome: Progressing  Note: Tolerating IV infiltration site with PRN pain medication Tylenol.      Problem: Fluid Volume:  Goal: Will maintain balanced intake and output  Outcome: Progressing

## 2021-05-13 NOTE — CARE PLAN
The patient is Stable - Low risk of patient condition declining or worsening         Summary of progress made towards problems/goals:    Problem: Communication  Goal: The ability to communicate needs accurately and effectively will improve  Outcome: Progressing     Problem: Knowledge Deficit  Goal: Knowledge of disease process/condition, treatment plan, diagnostic tests, and medications will improve  Outcome: Progressing         Problem: Venous Thromboembolism (VTW)/Deep Vein Thrombosis (DVT) Prevention:  Goal: Patient will participate in Venous Thrombosis (VTE)/Deep Vein Thrombosis (DVT)Prevention Measures  Outcome: Not Progressing  Note: Right arm is swollen. Going of US of extremity 5/13/2021 during the day.

## 2021-05-14 LAB
ANION GAP SERPL CALC-SCNC: 8 MMOL/L (ref 7–16)
ANISOCYTOSIS BLD QL SMEAR: ABNORMAL
BASOPHILS # BLD AUTO: 0 % (ref 0–1.8)
BASOPHILS # BLD: 0 K/UL (ref 0–0.12)
BUN SERPL-MCNC: 20 MG/DL (ref 8–22)
CALCIUM SERPL-MCNC: 8.8 MG/DL (ref 8.5–10.5)
CHLORIDE SERPL-SCNC: 103 MMOL/L (ref 96–112)
CO2 SERPL-SCNC: 30 MMOL/L (ref 20–33)
CREAT SERPL-MCNC: 1 MG/DL (ref 0.5–1.4)
EOSINOPHIL # BLD AUTO: 0.11 K/UL (ref 0–0.51)
EOSINOPHIL NFR BLD: 0.8 % (ref 0–6.9)
ERYTHROCYTE [DISTWIDTH] IN BLOOD BY AUTOMATED COUNT: 58.5 FL (ref 35.9–50)
GLUCOSE BLD-MCNC: 105 MG/DL (ref 65–99)
GLUCOSE BLD-MCNC: 128 MG/DL (ref 65–99)
GLUCOSE BLD-MCNC: 156 MG/DL (ref 65–99)
GLUCOSE SERPL-MCNC: 102 MG/DL (ref 65–99)
HCT VFR BLD AUTO: 35.7 % (ref 37–47)
HGB BLD-MCNC: 10.5 G/DL (ref 12–16)
INR PPP: 1.73 (ref 0.87–1.13)
LG PLATELETS BLD QL SMEAR: NORMAL
LYMPHOCYTES # BLD AUTO: 2 K/UL (ref 1–4.8)
LYMPHOCYTES NFR BLD: 14.8 % (ref 22–41)
MANUAL DIFF BLD: NORMAL
MCH RBC QN AUTO: 27.1 PG (ref 27–33)
MCHC RBC AUTO-ENTMCNC: 29.4 G/DL (ref 33.6–35)
MCV RBC AUTO: 92.2 FL (ref 81.4–97.8)
MICROCYTES BLD QL SMEAR: ABNORMAL
MONOCYTES # BLD AUTO: 0.47 K/UL (ref 0–0.85)
MONOCYTES NFR BLD AUTO: 3.5 % (ref 0–13.4)
MORPHOLOGY BLD-IMP: NORMAL
MYELOCYTES NFR BLD MANUAL: 0.9 %
NEUTROPHILS # BLD AUTO: 10.8 K/UL (ref 2–7.15)
NEUTROPHILS NFR BLD: 80 % (ref 44–72)
NRBC # BLD AUTO: 0 K/UL
NRBC BLD-RTO: 0 /100 WBC
PHOSPHATE SERPL-MCNC: 2.6 MG/DL (ref 2.5–4.5)
PLATELET # BLD AUTO: 226 K/UL (ref 164–446)
PLATELET BLD QL SMEAR: NORMAL
PMV BLD AUTO: 11.6 FL (ref 9–12.9)
POTASSIUM SERPL-SCNC: 3.8 MMOL/L (ref 3.6–5.5)
PROTHROMBIN TIME: 20.8 SEC (ref 12–14.6)
RBC # BLD AUTO: 3.87 M/UL (ref 4.2–5.4)
RBC BLD AUTO: PRESENT
SODIUM SERPL-SCNC: 141 MMOL/L (ref 135–145)
SPHEROCYTES BLD QL SMEAR: NORMAL
WBC # BLD AUTO: 13.5 K/UL (ref 4.8–10.8)

## 2021-05-14 PROCEDURE — 85610 PROTHROMBIN TIME: CPT

## 2021-05-14 PROCEDURE — 80048 BASIC METABOLIC PNL TOTAL CA: CPT

## 2021-05-14 PROCEDURE — 85027 COMPLETE CBC AUTOMATED: CPT

## 2021-05-14 PROCEDURE — A9270 NON-COVERED ITEM OR SERVICE: HCPCS | Performed by: STUDENT IN AN ORGANIZED HEALTH CARE EDUCATION/TRAINING PROGRAM

## 2021-05-14 PROCEDURE — 700101 HCHG RX REV CODE 250: Performed by: STUDENT IN AN ORGANIZED HEALTH CARE EDUCATION/TRAINING PROGRAM

## 2021-05-14 PROCEDURE — 94640 AIRWAY INHALATION TREATMENT: CPT

## 2021-05-14 PROCEDURE — 94760 N-INVAS EAR/PLS OXIMETRY 1: CPT

## 2021-05-14 PROCEDURE — 99226 PR SUBSEQUENT OBSERVATION CARE,LEVEL III: CPT | Performed by: STUDENT IN AN ORGANIZED HEALTH CARE EDUCATION/TRAINING PROGRAM

## 2021-05-14 PROCEDURE — 700111 HCHG RX REV CODE 636 W/ 250 OVERRIDE (IP): Performed by: STUDENT IN AN ORGANIZED HEALTH CARE EDUCATION/TRAINING PROGRAM

## 2021-05-14 PROCEDURE — 700102 HCHG RX REV CODE 250 W/ 637 OVERRIDE(OP): Performed by: STUDENT IN AN ORGANIZED HEALTH CARE EDUCATION/TRAINING PROGRAM

## 2021-05-14 PROCEDURE — 82962 GLUCOSE BLOOD TEST: CPT | Mod: 91

## 2021-05-14 PROCEDURE — 97530 THERAPEUTIC ACTIVITIES: CPT

## 2021-05-14 PROCEDURE — 84100 ASSAY OF PHOSPHORUS: CPT

## 2021-05-14 PROCEDURE — 85007 BL SMEAR W/DIFF WBC COUNT: CPT

## 2021-05-14 PROCEDURE — 36415 COLL VENOUS BLD VENIPUNCTURE: CPT

## 2021-05-14 PROCEDURE — 97535 SELF CARE MNGMENT TRAINING: CPT | Mod: XU

## 2021-05-14 PROCEDURE — 94660 CPAP INITIATION&MGMT: CPT

## 2021-05-14 PROCEDURE — G0378 HOSPITAL OBSERVATION PER HR: HCPCS

## 2021-05-14 RX ORDER — WARFARIN SODIUM 3 MG/1
1.5 TABLET ORAL
Status: COMPLETED | OUTPATIENT
Start: 2021-05-14 | End: 2021-05-14

## 2021-05-14 RX ADMIN — FLUTICASONE PROPIONATE 50 MCG: 50 SPRAY, METERED NASAL at 20:17

## 2021-05-14 RX ADMIN — PREDNISONE 40 MG: 20 TABLET ORAL at 04:47

## 2021-05-14 RX ADMIN — FUROSEMIDE 20 MG: 40 TABLET ORAL at 17:32

## 2021-05-14 RX ADMIN — ACETAMINOPHEN 650 MG: 325 TABLET, FILM COATED ORAL at 04:48

## 2021-05-14 RX ADMIN — WARFARIN SODIUM 1.5 MG: 3 TABLET ORAL at 20:03

## 2021-05-14 RX ADMIN — LITHIUM CARBONATE 600 MG: 300 TABLET, FILM COATED, EXTENDED RELEASE ORAL at 17:32

## 2021-05-14 RX ADMIN — MONTELUKAST 10 MG: 10 TABLET, FILM COATED ORAL at 20:23

## 2021-05-14 RX ADMIN — ATORVASTATIN CALCIUM 20 MG: 20 TABLET, FILM COATED ORAL at 17:32

## 2021-05-14 RX ADMIN — IPRATROPIUM BROMIDE AND ALBUTEROL SULFATE 3 ML: .5; 2.5 SOLUTION RESPIRATORY (INHALATION) at 10:15

## 2021-05-14 RX ADMIN — LITHIUM CARBONATE 300 MG: 300 TABLET, FILM COATED, EXTENDED RELEASE ORAL at 04:48

## 2021-05-14 RX ADMIN — POTASSIUM CHLORIDE 20 MEQ: 1500 TABLET, EXTENDED RELEASE ORAL at 04:47

## 2021-05-14 RX ADMIN — NYSTATIN 100000 UNITS: 100000 POWDER TOPICAL at 04:48

## 2021-05-14 RX ADMIN — FERROUS SULFATE TAB 325 MG (65 MG ELEMENTAL FE) 325 MG: 325 (65 FE) TAB at 04:47

## 2021-05-14 RX ADMIN — NYSTATIN 100000 UNITS: 100000 POWDER TOPICAL at 20:15

## 2021-05-14 RX ADMIN — METOPROLOL SUCCINATE 25 MG: 25 TABLET, EXTENDED RELEASE ORAL at 04:47

## 2021-05-14 RX ADMIN — TIOTROPIUM BROMIDE INHALATION SPRAY 5 MCG: 3.12 SPRAY, METERED RESPIRATORY (INHALATION) at 09:13

## 2021-05-14 RX ADMIN — WARFARIN SODIUM 6 MG: 6 TABLET ORAL at 17:32

## 2021-05-14 RX ADMIN — ARIPIPRAZOLE 30 MG: 10 TABLET ORAL at 04:48

## 2021-05-14 RX ADMIN — FUROSEMIDE 20 MG: 40 TABLET ORAL at 04:48

## 2021-05-14 ASSESSMENT — ENCOUNTER SYMPTOMS
MYALGIAS: 0
ORTHOPNEA: 0
DIZZINESS: 0
BLOOD IN STOOL: 0
VOMITING: 0
HEMOPTYSIS: 0
NAUSEA: 0
DEPRESSION: 0
PHOTOPHOBIA: 0
CHILLS: 0
HEARTBURN: 0
FLANK PAIN: 0
DOUBLE VISION: 0
HEADACHES: 0
SPUTUM PRODUCTION: 0
FEVER: 0
CLAUDICATION: 0
PALPITATIONS: 0
ABDOMINAL PAIN: 0
WHEEZING: 0
SPEECH CHANGE: 0
SENSORY CHANGE: 0
BLURRED VISION: 0
BRUISES/BLEEDS EASILY: 0
WEAKNESS: 1
FALLS: 1
SORE THROAT: 0
SHORTNESS OF BREATH: 1
DIARRHEA: 0
EYE DISCHARGE: 0
BACK PAIN: 0
COUGH: 0

## 2021-05-14 ASSESSMENT — COGNITIVE AND FUNCTIONAL STATUS - GENERAL
MOBILITY SCORE: 18
TURNING FROM BACK TO SIDE WHILE IN FLAT BAD: A LITTLE
MOVING TO AND FROM BED TO CHAIR: A LOT
SUGGESTED CMS G CODE MODIFIER MOBILITY: CK
CLIMB 3 TO 5 STEPS WITH RAILING: A LITTLE
MOVING FROM LYING ON BACK TO SITTING ON SIDE OF FLAT BED: A LOT

## 2021-05-14 ASSESSMENT — GAIT ASSESSMENTS
GAIT LEVEL OF ASSIST: SUPERVISED
DEVIATION: BRADYKINETIC;SHUFFLED GAIT;DECREASED HEEL STRIKE;DECREASED TOE OFF;OTHER (COMMENT)
DISTANCE (FEET): 35
ASSISTIVE DEVICE: FRONT WHEEL WALKER

## 2021-05-14 ASSESSMENT — PAIN DESCRIPTION - PAIN TYPE: TYPE: ACUTE PAIN

## 2021-05-14 ASSESSMENT — FIBROSIS 4 INDEX: FIB4 SCORE: 0.6

## 2021-05-14 NOTE — PROGRESS NOTES
Inpatient Anticoagulation Service Note    Date: 5/14/2021    Reason for Anticoagulation: Atrial Fibrillation   Target INR: 2.0 to 3.0  [id number] VASc Score: 1  HAS-BLED Score: 1   Hemoglobin Value: (!) 10.5  Hematocrit Value: (!) 35.7  Lab Platelet Value: 226    INR from last 7 days     Date/Time INR Value    05/14/21 0606  (!) 1.73    05/13/21 0908  (!) 1.68    05/12/21 03:41:01  (!) 1.52    05/11/21 0339  (!) 1.48    05/10/21 05:42:01  (!) 1.39    05/09/21 0309  (!) 1.64    05/08/21 2153  (!) 1.62        Dose from last 7 days     Date/Time Dose (mg)    05/14/21 1400  7.5    05/13/21 1334  6    05/12/21 1622  6    05/11/21 1417  8    05/10/21 1442  6    05/09/21 0158  6          Average Dose (mg): 6  Significant Interactions: Statin, Corticosteroids  Bridge Therapy: No    Reversal Agent Administered: Not Applicable    Comments:  45-y/o F w/ PMHx of COPD on 3L/min, schizophrenia, bipolar disorder, and morbid obesity who presents w/ c/c of worsening shortness of breath. DDI noted. No documented signs of overt bleeding.     Plan:  Warfarin 7.5mg po daily, INR in AM.   Education Material Provided?: No (on chronic therapy as outpatient)  Pharmacist suggested discharge dosing: Warfarin 7.5mg po daily , follow up with anticoagulation clinic after discharge.      Tirso Childers, PharmD, BCPS

## 2021-05-14 NOTE — THERAPY
Physical Therapy   Daily Treatment     Patient Name: Lorene Haro  Age:  45 y.o., Sex:  female  Medical Record #: [id number]  Today's Date: 5/14/2021     Precautions: Fall Risk    Assessment    Pt progressing fair with re: mobility at this time. She was able to demonstrate bed mobility without physical assist (though effortful), sit<>stands and short distance ambulation with FWW with SPv. She is actually mobilizing fair at this time. However primary concerns for dc planning are regarding self care/ADL and IADL management as noted pt required assist for toileting during visit, reports limited planning re: med management and has concerns in general with showering and other self care needs. See below for details/recs.     Plan    Continue current treatment plan.    DC Equipment Recommendations: Unable to determine at this time  Discharge Recommendations: Optimally would recommend post-acute placement for additional physical therapy services prior to discharge home to promote functional endurance, balance and promotion of I function with IADL and ADls; would defer to OT recs for dc planning as well and spoke with nsg re: concerns (as pt would likely be capable of dc to home if she has assist with ADls and IADls as aforementioned in assessment)         05/14/21 1539   Cognition    Cognition / Consciousness X   Level of Consciousness Alert   Safety Awareness Impaired   New Learning Impaired   Comments no noted lability this visit; hwoever she does have somewhat of an odd/flat affect; query pt's higher level cognition/decision making and problem solving abilities (as she has limited plans re: self care, hygeine, etc upon dc to home plan)   Balance   Sitting Balance (Static) Fair   Sitting Balance (Dynamic) Fair   Standing Balance (Static) Fair   Standing Balance (Dynamic) Fair   Weight Shift Sitting Fair   Weight Shift Standing Fair   Skilled Intervention Verbal Cuing;Compensatory Strategies   Comments no sherin LOB  during ambulation with FWW;    Gait Analysis   Gait Level Of Assist Supervised   Assistive Device Front Wheel Walker   Distance (Feet) 35   # of Times Distance was Traveled 2   Deviation Bradykinetic;Shuffled Gait;Decreased Heel Strike;Decreased Toe Off;Other (Comment)  (dec marvin/clearance; this is likely baseline mechanics)   # of Stairs Climbed 0   Weight Bearing Status no restrictions   Skilled Intervention Verbal Cuing;Compensatory Strategies   Comments see balance   Bed Mobility    Supine to Sit Modified Independent   Sit to Supine   (left in chair)   Scooting Supervised   Skilled Intervention Verbal Cuing;Compensatory Strategies   Comments HOB elevated; effortful though performs wihtout physical assist   Functional Mobility   Sit to Stand Supervised   Bed, Chair, Wheelchair Transfer Supervised   Toilet Transfers Modified Independent   Transfer Method Stand Step   Mobility with FWW   Skilled Intervention Verbal Cuing;Compensatory Strategies;Tactile Cuing   How much difficulty does the patient currently have...   Turning over in bed (including adjusting bedclothes, sheets and blankets)? 3   Sitting down on and standing up from a chair with arms (e.g., wheelchair, bedside commode, etc.) 2   Moving from lying on back to sitting on the side of the bed? 2   How much help from another person does the patient currently need...   Moving to and from a bed to a chair (including a wheelchair)? 4   Need to walk in a hospital room? 4   Climbing 3-5 steps with a railing? 3   6 clicks Mobility Score 18   Activity Tolerance   Sitting in Chair at least 10 mins   Sitting Edge of Bed at least 3 mins   Standing at least 5 mins   Comments no overt/acute fatigue; no c/o pain this visit   Patient / Family Goals    Patient / Family Goal #1 to go home   Goal #1 Outcome Goal not met   Short Term Goals    Short Term Goal # 1 pt will go supine<>sit w/hob flat and rails down I in 6tx for safe d/c home    Goal Outcome # 1 Progressing as  expected   Short Term Goal # 2 pt will go sit<>stand w/fww w/ SPV in 6tx for safe d/c home    Goal Outcome # 2 Goal met   Short Term Goal # 3 pt will transfer bed<>chair w/fww w/SPV in 6tx for safe d/c home    Goal Outcome # 3 Goal met   Short Term Goal # 4 pt will ambulate for 150ft w/fww w/SPV in 6tx for safe d/c home    Goal Outcome # 4 Progressing as expected   Education Group   Role of Physical Therapist Patient Response Patient;Acceptance;Explanation;Verbal Demonstration   Use of Assistive Device Patient Response Patient;Acceptance;Explanation;Demonstration;Action Demonstration;Verbal Demonstration

## 2021-05-14 NOTE — CARE PLAN
Problem: Knowledge Deficit  Goal: Knowledge of disease process/condition, treatment plan, diagnostic tests, and medications will improve  Outcome: Progressing  Goal: Knowledge of the prescribed therapeutic regimen will improve  Outcome: Progressing     Problem: Bowel/Gastric:  Goal: Normal bowel function is maintained or improved  Outcome: Progressing     Problem: Pain Management  Goal: Pain level will decrease to patient's comfort goal  Outcome: Progressing   The patient is Stable - Low risk of patient condition declining or worsening         Progress made toward(s) clinical / shift goals:  progressing    Patient is not progressing towards the following goals:

## 2021-05-14 NOTE — PROGRESS NOTES
[facility] Medicine Daily Progress Note    Date of Service  5/14/2021    Chief Complaint  45 y.o. female admitted 5/8/2021 with shortness of breath     Hospital Course    The patient is a 45-year-old female with a past medical history of COPD on 3L/min, schizophrenia, bipolar disorder, and morbid obesity who presents to the ED with a chief complaint of worsening shortness of breath.    D-dimer was not elevated   NT proBNP 457. Procal neg  Patient likely with COPD exacerbation/KATHIA/OHS. Still smoking.   RUE swelling, pain. Doppler: no DVT.  PT/OT rec post acute. However due to insurance, all SNF declined. Patient is severe debilitated, unable to even hold plates, Rehab referred.    [facility] ordered, however I doubt she is safe to dc home with [facility] since she lives alone and is even very weak to hold plates.     Interval Problem Update  The patient was seen and evaluated at bedside  Laying bed, lethargic, weak, no chest pain, sob.   She reported RUE swelling and pain. No DVT on doppler.  On 2L O2, which is her baseline O2    All Data, Medication data reviewed.  Case discussed with nursing, CM,  nurse, pharmacy in IDT rounds.   Difficult placement due to insurance issue    Consultants/Specialty  None    Code Status  Full Code    Disposition  Pending placement    Review of Systems  Review of Systems   Constitutional: Positive for malaise/fatigue. Negative for chills and fever.   HENT: Negative for congestion, hearing loss, sore throat and tinnitus.    Eyes: Negative for blurred vision, double vision, photophobia and discharge.   Respiratory: Positive for shortness of breath. Negative for cough, hemoptysis, sputum production and wheezing.    Cardiovascular: Negative for chest pain, palpitations, orthopnea, claudication and leg swelling.   Gastrointestinal: Negative for abdominal pain, blood in stool, diarrhea, heartburn, melena, nausea and vomiting.   Genitourinary: Negative for dysuria, flank pain, hematuria and urgency.    Musculoskeletal: Positive for falls and joint pain (RUE pain and swelling). Negative for back pain and myalgias.   Skin: Negative for itching and rash.   Neurological: Positive for weakness. Negative for dizziness, sensory change, speech change and headaches.   Endo/Heme/Allergies: Does not bruise/bleed easily.   Psychiatric/Behavioral: Negative for depression and suicidal ideas.        Physical Exam  Temp:  [35.9 °C (96.7 °F)-36.8 °C (98.3 °F)] 35.9 °C (96.7 °F)  Pulse:  [51-67] 64  Resp:  [18-20] 20  BP: ()/(41-64) 105/41  SpO2:  [96 %-98 %] 96 %    Physical Exam  Vitals and nursing note reviewed.   Constitutional:       General: She is not in acute distress.     Appearance: Normal appearance. She is obese.   HENT:      Head: Normocephalic and atraumatic.      Mouth/Throat:      Mouth: Mucous membranes are moist.   Eyes:      Extraocular Movements: Extraocular movements intact.      Conjunctiva/sclera: Conjunctivae normal.      Pupils: Pupils are equal, round, and reactive to light.   Cardiovascular:      Rate and Rhythm: Normal rate and regular rhythm.      Heart sounds: No murmur heard.   No friction rub.   Pulmonary:      Effort: Pulmonary effort is normal. No respiratory distress.      Breath sounds: Normal breath sounds. No wheezing.      Comments: On oxygen supplements  Abdominal:      General: Abdomen is flat. Bowel sounds are normal.      Palpations: Abdomen is soft.      Tenderness: There is no abdominal tenderness. There is no guarding.   Musculoskeletal:         General: Swelling (RUE) present. No tenderness. Normal range of motion.      Cervical back: Normal range of motion and neck supple.   Skin:     General: Skin is warm and dry.      Findings: No rash.   Neurological:      General: No focal deficit present.      Mental Status: She is alert and oriented to person, place, and time.      Cranial Nerves: No cranial nerve deficit.      Motor: No weakness.   Psychiatric:         Mood and Affect:  Mood normal.         Behavior: Behavior normal.         Fluids    Intake/Output Summary (Last 24 hours) at 5/14/2021 1245  Last data filed at 5/14/2021 1000  Gross per 24 hour   Intake 720 ml   Output 1700 ml   Net -980 ml       Laboratory  Recent Labs     05/12/21  0341 05/14/21  0606   WBC 13.4* 13.5*   RBC 4.02* 3.87*   HEMOGLOBIN 11.3* 10.5*   HEMATOCRIT 37.0 35.7*   MCV 92.0 92.2   MCH 28.1 27.1   MCHC 30.5* 29.4*   RDW 57.1* 58.5*   PLATELETCT 231 226   MPV 10.9 11.6     Recent Labs     05/12/21  0341 05/14/21  0606   SODIUM 139 141   POTASSIUM 3.7 3.8   CHLORIDE 101 103   CO2 31 30   GLUCOSE 93 102*   BUN 27* 20   CREATININE 1.05 1.00   CALCIUM 9.1 8.8     Recent Labs     05/12/21  0341 05/13/21  0908 05/14/21  0606   INR 1.52* 1.68* 1.73*               Imaging  US-EXTREMITY VENOUS UPPER UNILAT RIGHT   Final Result      DX-CHEST-PORTABLE (1 VIEW)   Final Result         1. No acute cardiopulmonary abnormalities are identified.           Assessment/Plan  * Acute on chronic respiratory failure (HCC)- (present on admission)  Assessment & Plan  Multifactorial, likely sec 2/2 copd and obesity hypoventilation syndrome, KATHIA  's  Dimer negative  Obtain procal neg  Cont duonebs, home inhalers, and steroids and O2 support  Lasix  IS    KATHIA and COPD overlap syndrome (HCC)- (present on admission)  Assessment & Plan  With COPD exacerbation this admission  Continue home inhalers  Pulse ox and oxygen support, 3L required in ED.  Maintain >93%  Smoking cessation counseling 12 minutes  Consider nightly cpap  Most likely copd exacerbation: duonebs, home inhalers, and solumedrol 62.5mb BID ordered, taper steroids and O2 support commensurate with recovery.        Elevated brain natriuretic peptide (BNP) level- (present on admission)  Assessment & Plan  Chronic  >500 upon this admission. Ranges from 200-500 on previous labs within past year.  Low suspicion for CHF  Last Echo performed 3/20/21: Prior echocardiogram  1/18/2021. No changes.  Normal left ventricular systolic function. Left ventricular ejection fraction is visually estimated to be 60%. The right ventricle was normal in size and function.  On lasix     Morbid obesity (HCC)- (present on admission)  Assessment & Plan  Continue to  on lifestyle modifications to include diet and exercise. Outpatient bariatric surgery evaluation recommended    Chest pain, rule out acute myocardial infarction- (present on admission)  Assessment & Plan  Trop trending neg  EKG: NSR, no acute ST-T changes  Reports chest pain resolved    Debilitated  Assessment & Plan  Severe debilitated  PT/OT  Encourage ambulation  PMR referral    Leukocytosis- (present on admission)  Assessment & Plan  Procal neg.   Mild, Likely reactive, sec to steroid use  Cont to monitor    Bipolar Disorder- (present on admission)  Assessment & Plan  Continue home medications     Yeast infection of the skin- (present on admission)  Assessment & Plan  Continue nystatin to intertriginous areas including under breasts and crux    Paroxysmal atrial fibrillation (HCC)- (present on admission)  Assessment & Plan  On coumadin, will continue with Coumadin. I coordinate with pharmacy for coumadin dosing. INR target 2-3. Closely monitor any sign of bleeding.  Presumed inr goal of 2-3.  Daily INRs        Schizophrenia (HCC)- (present on admission)  Assessment & Plan  Lithium level in AM  Cont. 300mg in AM, 600mg qhs unless levels high  Cont. abilify 30mg qam    Tobacco use- (present on admission)  Assessment & Plan  5 minutes on tobacco cessation counseling provided including nicotine patches, gum, and dangers of smoking. Discussed the risks of smoking including increased risk of heart disease, stroke, cancer and COPD. Discussed the benefits of quitting smoking.       Iron deficiency anemia- (present on admission)  Assessment & Plan  Iron studies and ferriting, c/w ROCÍO  Denies active bleeding  Cont iron supplements       VTE  prophylaxis: warfarin

## 2021-05-14 NOTE — CARE PLAN
The patient is Stable - Low risk of patient condition declining or worsening    Shift Goals  Clinical Goals: safety  Patient Goals: sleep, discharge plans    Progress made toward(s) clinical / shift goals:        Problem: Communication  Goal: The ability to communicate needs accurately and effectively will improve  Outcome: Progressing     Problem: Bowel/Gastric:  Goal: Normal bowel function is maintained or improved  Outcome: Progressing     Problem: Pain Management  Goal: Pain level will decrease to patient's comfort goal  Outcome: Progressing     Problem: Respiratory:  Goal: Respiratory status will improve  Outcome: Progressing

## 2021-05-14 NOTE — DISCHARGE PLANNING
[facility] Coordination    Referral from:  Dr. Gallardo  Insurance Provider on Facesheet:  Medicaid FFS  Potential Rehab Diagnosis:  Pulmonary    Chart review indicates patient has on going medical management and therapy needs to possibly meet inpatient rehab facility criteria with the goal of returning to community.    D/C support: Limited discharge support - lives alone     Physiatry consult pended.  Current documentation reflects limited tolerance for IRF level therapy regimen.  Would welcome PT/OT udpates.  TCC will follow.  Please reach out sooner if PMR consult requested for medical management.      Last Covid test:  5/08/2021 not detected     Thank you for the referral.

## 2021-05-14 NOTE — PROGRESS NOTES
Received bedside report from RN, pt care assumed, VSS, pt assessment complete. Pt AAOx4, no complaints of pain at this time. No signs of acute distress noted at this time. POC discussed with pt and verbalizes no questions. Pt denies any additional needs at this time. Bed in lowest position, pt educated on fall risk and verbalized understanding, call light within reach, hourly rounding initiated.

## 2021-05-14 NOTE — DISCHARGE PLANNING
Anticipated Discharge Disposition: Home with HH    Action: RN NEREIDA spoke with patient at bedside to review HH choices.  Patient provided choice for 1. [facility], [facility]. [facility], 3. [facility].  Choice form faxed to [facility].  Phone number for patient's sister Kylah updated on the facesheet.    Barriers to Discharge: HH acceptance     Plan: Case coordination to continue to follow up with medical team to discuss discharge barriers.

## 2021-05-14 NOTE — PROGRESS NOTES
Assumed care at 1900, bedside report received from Rachana GREENE. Pt. Is Medical  on the monitor. Initial assessment completed, orders reviewed, call light within reach, bed alarm  in use, and hourly rounding in place. POC addressed with patient, no additional questions at this time.

## 2021-05-15 LAB
BACTERIA BLD CULT: NORMAL
BACTERIA BLD CULT: NORMAL
EKG IMPRESSION: NORMAL
GLUCOSE BLD-MCNC: 102 MG/DL (ref 65–99)
GLUCOSE BLD-MCNC: 122 MG/DL (ref 65–99)
GLUCOSE BLD-MCNC: 147 MG/DL (ref 65–99)
GLUCOSE BLD-MCNC: 85 MG/DL (ref 65–99)
INR PPP: 1.6 (ref 0.87–1.13)
PROTHROMBIN TIME: 19.5 SEC (ref 12–14.6)
SIGNIFICANT IND 70042: NORMAL
SIGNIFICANT IND 70042: NORMAL
SITE SITE: NORMAL
SITE SITE: NORMAL
SOURCE SOURCE: NORMAL
SOURCE SOURCE: NORMAL

## 2021-05-15 PROCEDURE — 36415 COLL VENOUS BLD VENIPUNCTURE: CPT

## 2021-05-15 PROCEDURE — A9270 NON-COVERED ITEM OR SERVICE: HCPCS | Performed by: STUDENT IN AN ORGANIZED HEALTH CARE EDUCATION/TRAINING PROGRAM

## 2021-05-15 PROCEDURE — 85610 PROTHROMBIN TIME: CPT

## 2021-05-15 PROCEDURE — G0378 HOSPITAL OBSERVATION PER HR: HCPCS

## 2021-05-15 PROCEDURE — 82962 GLUCOSE BLOOD TEST: CPT

## 2021-05-15 PROCEDURE — 99225 PR SUBSEQUENT OBSERVATION CARE,LEVEL II: CPT | Performed by: STUDENT IN AN ORGANIZED HEALTH CARE EDUCATION/TRAINING PROGRAM

## 2021-05-15 PROCEDURE — 94660 CPAP INITIATION&MGMT: CPT

## 2021-05-15 PROCEDURE — 94640 AIRWAY INHALATION TREATMENT: CPT

## 2021-05-15 PROCEDURE — 93010 ELECTROCARDIOGRAM REPORT: CPT | Performed by: INTERNAL MEDICINE

## 2021-05-15 PROCEDURE — 700102 HCHG RX REV CODE 250 W/ 637 OVERRIDE(OP): Performed by: STUDENT IN AN ORGANIZED HEALTH CARE EDUCATION/TRAINING PROGRAM

## 2021-05-15 PROCEDURE — 700111 HCHG RX REV CODE 636 W/ 250 OVERRIDE (IP): Performed by: STUDENT IN AN ORGANIZED HEALTH CARE EDUCATION/TRAINING PROGRAM

## 2021-05-15 PROCEDURE — 94760 N-INVAS EAR/PLS OXIMETRY 1: CPT

## 2021-05-15 PROCEDURE — 93005 ELECTROCARDIOGRAM TRACING: CPT | Performed by: STUDENT IN AN ORGANIZED HEALTH CARE EDUCATION/TRAINING PROGRAM

## 2021-05-15 RX ADMIN — FLUTICASONE PROPIONATE 50 MCG: 50 SPRAY, METERED NASAL at 07:42

## 2021-05-15 RX ADMIN — NYSTATIN 100000 UNITS: 100000 POWDER TOPICAL at 05:12

## 2021-05-15 RX ADMIN — LITHIUM CARBONATE 600 MG: 300 TABLET, FILM COATED, EXTENDED RELEASE ORAL at 17:59

## 2021-05-15 RX ADMIN — ATORVASTATIN CALCIUM 20 MG: 20 TABLET, FILM COATED ORAL at 17:59

## 2021-05-15 RX ADMIN — LITHIUM CARBONATE 300 MG: 300 TABLET, FILM COATED, EXTENDED RELEASE ORAL at 05:14

## 2021-05-15 RX ADMIN — FUROSEMIDE 20 MG: 40 TABLET ORAL at 17:59

## 2021-05-15 RX ADMIN — ARIPIPRAZOLE 30 MG: 10 TABLET ORAL at 05:10

## 2021-05-15 RX ADMIN — PREDNISONE 40 MG: 20 TABLET ORAL at 05:13

## 2021-05-15 RX ADMIN — FERROUS SULFATE TAB 325 MG (65 MG ELEMENTAL FE) 325 MG: 325 (65 FE) TAB at 07:42

## 2021-05-15 RX ADMIN — TIOTROPIUM BROMIDE INHALATION SPRAY 5 MCG: 3.12 SPRAY, METERED RESPIRATORY (INHALATION) at 09:37

## 2021-05-15 RX ADMIN — WARFARIN SODIUM 9 MG: 6 TABLET ORAL at 17:59

## 2021-05-15 RX ADMIN — NYSTATIN 100000 UNITS: 100000 POWDER TOPICAL at 20:56

## 2021-05-15 RX ADMIN — POTASSIUM CHLORIDE 20 MEQ: 1500 TABLET, EXTENDED RELEASE ORAL at 05:13

## 2021-05-15 RX ADMIN — FLUTICASONE PROPIONATE 50 MCG: 50 SPRAY, METERED NASAL at 20:56

## 2021-05-15 RX ADMIN — MONTELUKAST 10 MG: 10 TABLET, FILM COATED ORAL at 20:56

## 2021-05-15 ASSESSMENT — FIBROSIS 4 INDEX
FIB4 SCORE: 0.6
FIB4 SCORE: 0.6

## 2021-05-15 NOTE — CARE PLAN
The patient is Stable - Low risk of patient condition declining or worsening    Shift Goals  Clinical Goals: Safety, Improved oxygenation, Increased activity.  Patient Goals: sleep comfortably, discharge plans    Progress made toward(s) clinical / shift goals: Pt switched from NC to CPAP. Pt wearing CPAP at night and while sleeping. Pt is ambulatory and call up to restroom for toileting frequently. Pt continues to call appropriately.    Problem: Impaired Gas Exchange  Goal: Patient will demonstrate improved ventilation and adequate oxygenation and participate in treatment regimen within the level of ability/situation.  Outcome: Progressing    Problem: Self Care  Goal: Patient will have the ability to perform ADLs independently or with assistance (bathe, groom, dress, toilet and feed)  Outcome: Progressing    Patient is not progressing towards the following goals: N/A

## 2021-05-15 NOTE — PROGRESS NOTES
Bedside report received and patient care assumed. Pt is resting in bed, A&Ox4, with no complaints of pain, and is on 3L via NC. All fall precautions are in place, belongings at bedside table.  Pt was updated on POC, no questions or concerns. Pt educated on use of call light for assistance.

## 2021-05-15 NOTE — PROGRESS NOTES
Inpatient Anticoagulation Service Note    Date: 5/15/2021    Reason for Anticoagulation: Atrial Fibrillation   Target INR: 2.0 to 3.0  [id number] VASc Score: 1  HAS-BLED Score: 1   Hemoglobin Value: (!) 10.5  Hematocrit Value: (!) 35.7  Lab Platelet Value: 226    INR from last 7 days     Date/Time INR Value    05/15/21 1207  (!) 1.6    05/14/21 0606  (!) 1.73    05/13/21 0908  (!) 1.68    05/12/21 03:41:01  (!) 1.52    05/11/21 0339  (!) 1.48    05/10/21 05:42:01  (!) 1.39    05/09/21 0309  (!) 1.64    05/08/21 2153  (!) 1.62        Dose from last 7 days     Date/Time Dose (mg)    05/15/21 1100  9    05/14/21 1400  7.5    05/13/21 1334  6    05/12/21 1622  6    05/11/21 1417  8    05/10/21 1442  6    05/09/21 0158  6        Average Dose (mg): 6  Significant Interactions: Statin, Corticosteroids  Bridge Therapy: No     Reversal Agent Administered: Not Applicable    Comments:  45-y/o F w/ PMHx of COPD on 3L/min O2, schizophrenia, bipolar disorder, and morbid obesity who presents w/ c/c of worsening shortness of breath. DDI noted. No documented signs of overt bleeding.      Plan:  Warfarin 9mg po tonight, INR in AM.   Education Material Provided?: No (on chronic therapy as outpatient)  Pharmacist suggested discharge dosing: Warfarin 7.5-9mg po daily , follow up with anticoagulation clinic after discharge.      Tirso Childers, PharmD, BCPS

## 2021-05-16 LAB
EKG IMPRESSION: NORMAL
GLUCOSE BLD-MCNC: 110 MG/DL (ref 65–99)
GLUCOSE BLD-MCNC: 114 MG/DL (ref 65–99)
GLUCOSE BLD-MCNC: 156 MG/DL (ref 65–99)
GLUCOSE BLD-MCNC: 168 MG/DL (ref 65–99)
INR PPP: 1.8 (ref 0.87–1.13)
PROTHROMBIN TIME: 21.5 SEC (ref 12–14.6)

## 2021-05-16 PROCEDURE — 93005 ELECTROCARDIOGRAM TRACING: CPT | Performed by: STUDENT IN AN ORGANIZED HEALTH CARE EDUCATION/TRAINING PROGRAM

## 2021-05-16 PROCEDURE — 700102 HCHG RX REV CODE 250 W/ 637 OVERRIDE(OP): Performed by: STUDENT IN AN ORGANIZED HEALTH CARE EDUCATION/TRAINING PROGRAM

## 2021-05-16 PROCEDURE — 36415 COLL VENOUS BLD VENIPUNCTURE: CPT

## 2021-05-16 PROCEDURE — A9270 NON-COVERED ITEM OR SERVICE: HCPCS | Performed by: STUDENT IN AN ORGANIZED HEALTH CARE EDUCATION/TRAINING PROGRAM

## 2021-05-16 PROCEDURE — 96372 THER/PROPH/DIAG INJ SC/IM: CPT

## 2021-05-16 PROCEDURE — 93010 ELECTROCARDIOGRAM REPORT: CPT | Performed by: INTERNAL MEDICINE

## 2021-05-16 PROCEDURE — G0378 HOSPITAL OBSERVATION PER HR: HCPCS

## 2021-05-16 PROCEDURE — 700111 HCHG RX REV CODE 636 W/ 250 OVERRIDE (IP): Performed by: STUDENT IN AN ORGANIZED HEALTH CARE EDUCATION/TRAINING PROGRAM

## 2021-05-16 PROCEDURE — 94640 AIRWAY INHALATION TREATMENT: CPT

## 2021-05-16 PROCEDURE — 99225 PR SUBSEQUENT OBSERVATION CARE,LEVEL II: CPT | Performed by: STUDENT IN AN ORGANIZED HEALTH CARE EDUCATION/TRAINING PROGRAM

## 2021-05-16 PROCEDURE — 82962 GLUCOSE BLOOD TEST: CPT | Mod: 91

## 2021-05-16 PROCEDURE — 94660 CPAP INITIATION&MGMT: CPT

## 2021-05-16 PROCEDURE — 94760 N-INVAS EAR/PLS OXIMETRY 1: CPT

## 2021-05-16 PROCEDURE — 85610 PROTHROMBIN TIME: CPT

## 2021-05-16 RX ADMIN — WARFARIN SODIUM 9 MG: 6 TABLET ORAL at 17:03

## 2021-05-16 RX ADMIN — MONTELUKAST 10 MG: 10 TABLET, FILM COATED ORAL at 22:06

## 2021-05-16 RX ADMIN — FUROSEMIDE 20 MG: 40 TABLET ORAL at 17:03

## 2021-05-16 RX ADMIN — TIOTROPIUM BROMIDE INHALATION SPRAY 5 MCG: 3.12 SPRAY, METERED RESPIRATORY (INHALATION) at 08:09

## 2021-05-16 RX ADMIN — LITHIUM CARBONATE 300 MG: 300 TABLET, FILM COATED, EXTENDED RELEASE ORAL at 06:20

## 2021-05-16 RX ADMIN — PREDNISONE 40 MG: 20 TABLET ORAL at 06:20

## 2021-05-16 RX ADMIN — ATORVASTATIN CALCIUM 20 MG: 20 TABLET, FILM COATED ORAL at 17:05

## 2021-05-16 RX ADMIN — ARIPIPRAZOLE 30 MG: 10 TABLET ORAL at 06:20

## 2021-05-16 RX ADMIN — FERROUS SULFATE TAB 325 MG (65 MG ELEMENTAL FE) 325 MG: 325 (65 FE) TAB at 08:24

## 2021-05-16 RX ADMIN — FLUTICASONE PROPIONATE 50 MCG: 50 SPRAY, METERED NASAL at 22:07

## 2021-05-16 RX ADMIN — POTASSIUM CHLORIDE 20 MEQ: 1500 TABLET, EXTENDED RELEASE ORAL at 06:21

## 2021-05-16 RX ADMIN — FLUTICASONE PROPIONATE 50 MCG: 50 SPRAY, METERED NASAL at 08:24

## 2021-05-16 RX ADMIN — LITHIUM CARBONATE 600 MG: 300 TABLET, FILM COATED, EXTENDED RELEASE ORAL at 17:01

## 2021-05-16 ASSESSMENT — FIBROSIS 4 INDEX: FIB4 SCORE: 0.6

## 2021-05-16 NOTE — CARE PLAN
The patient is Stable - Low risk of patient condition declining or worsening    Shift Goals  Clinical Goals: safe mobilization  Patient Goals: Rest well    Progress made toward(s) clinical / shift goals:    Problem: Risk for Infection - COPD  Goal: Patient will remain free from signs and symptoms of infection  Outcome: Progressing     Problem: Self Care  Goal: Patient will have the ability to perform ADLs independently or with assistance (bathe, groom, dress, toilet and feed)  Outcome: Progressing     Problem: Respiratory  Goal: Patient will achieve/maintain optimum respiratory ventilation and gas exchange  Outcome: Progressing       Patient is not progressing towards the following goals:

## 2021-05-16 NOTE — PROGRESS NOTES
Inpatient Anticoagulation Service Note    Date: 5/16/2021    Reason for Anticoagulation: Atrial Fibrillation   Target INR: 2.0 to 3.0  [id number] VASc Score: 1  HAS-BLED Score: 1   Hemoglobin Value: (!) 10.5  Hematocrit Value: (!) 35.7  Lab Platelet Value: 226    INR from last 7 days     Date/Time INR Value    05/16/21 0347  (!) 1.8    05/15/21 1207  (!) 1.6    05/14/21 0606  (!) 1.73    05/13/21 0908  (!) 1.68    05/12/21 03:41:01  (!) 1.52    05/11/21 0339  (!) 1.48    05/10/21 05:42:01  (!) 1.39        Dose from last 7 days     Date/Time Dose (mg)    05/16/21 1000  9    05/15/21 1100  9    05/14/21 1400  7.5    05/13/21 1334  6    05/12/21 1622  6    05/11/21 1417  8    05/10/21 1442  6        Average Dose (mg): 6  Significant Interactions: Statin, Corticosteroids  Bridge Therapy: No    Reversal Agent Administered: Not Applicable    Comments:  45-y/o F w/ PMHx of COPD on 3L/min O2, schizophrenia, bipolar disorder, and morbid obesity who presents w/ c/c of worsening shortness of breath. DDI noted. No documented signs of overt bleeding.      Plan:  Warfarin 9mg po tonight, INR in AM.   Education Material Provided?: No (on chronic therapy as outpatient)  Pharmacist suggested discharge dosing: Warfarin 7.5-9mg po daily , follow up with anticoagulation clinic after discharge.      Tirso Childers, PharmD, BCPS

## 2021-05-16 NOTE — CARE PLAN
The patient is Stable - Low risk of patient condition declining or worsening    Shift Goals  Clinical Goals: Safety, rest well with CPAP, no new resp sx  Patient Goals: Rest well      Problem: Knowledge Deficit - COPD  Goal: Patient/significant other demonstrates understanding of disease process, utilization of the Action Plan, medications and discharge instruction  Outcome: Progressing     Problem: Risk for Infection - COPD  Goal: Patient will remain free from signs and symptoms of infection  Outcome: Progressing     Problem: Impaired Gas Exchange  Goal: Patient will demonstrate improved ventilation and adequate oxygenation and participate in treatment regimen within the level of ability/situation.  Outcome: Progressing     Problem: Fluid Volume  Goal: Fluid volume balance will be maintained  Outcome: Progressing     Problem: Respiratory  Goal: Patient will achieve/maintain optimum respiratory ventilation and gas exchange  Outcome: Progressing

## 2021-05-16 NOTE — PROGRESS NOTES
Assumed care of patient at bedside report from NOC RN. Updated on POC. Patient currently A & O x 4; on 2 L O2 nasal cannula; up standby assist; without complaints of acute pain. Assessment completed. Call light within reach. Whiteboard updated. Fall precautions in place. Bed locked and in lowest position. All questions answered. No other needs indicated at this time.

## 2021-05-16 NOTE — PROGRESS NOTES
Assumed care for patient. Bedside report done with Pilar GREENE. Pt is sitting on the chair, assisted to the bathroom and back to bed. Updated on poc. Call light within reach.

## 2021-05-16 NOTE — PROGRESS NOTES
Pt reports having chest pain that feels like her heart is fluttering, radiating to the back. Reports she has had a similar episode before. Pt is not in tele, EKG obtained per protocol, SB. Pt reports that when she's talking with RN, chest pain decreases and comes back when RN leaves. Pt comforted, mostly anxious because of current condition. Will continue to monitor.

## 2021-05-17 ENCOUNTER — APPOINTMENT (OUTPATIENT)
Dept: SLEEP MEDICINE | Facility: MEDICAL CENTER | Age: 46
End: 2021-05-17
Payer: MEDICAID

## 2021-05-17 LAB
GLUCOSE BLD-MCNC: 106 MG/DL (ref 65–99)
GLUCOSE BLD-MCNC: 113 MG/DL (ref 65–99)
GLUCOSE BLD-MCNC: 90 MG/DL (ref 65–99)
GLUCOSE BLD-MCNC: 90 MG/DL (ref 65–99)
INR PPP: 2.01 (ref 0.87–1.13)
PROTHROMBIN TIME: 23.4 SEC (ref 12–14.6)

## 2021-05-17 PROCEDURE — G0378 HOSPITAL OBSERVATION PER HR: HCPCS

## 2021-05-17 PROCEDURE — 85610 PROTHROMBIN TIME: CPT | Mod: 91

## 2021-05-17 PROCEDURE — 97535 SELF CARE MNGMENT TRAINING: CPT

## 2021-05-17 PROCEDURE — 99225 PR SUBSEQUENT OBSERVATION CARE,LEVEL II: CPT | Performed by: STUDENT IN AN ORGANIZED HEALTH CARE EDUCATION/TRAINING PROGRAM

## 2021-05-17 PROCEDURE — 700102 HCHG RX REV CODE 250 W/ 637 OVERRIDE(OP): Performed by: STUDENT IN AN ORGANIZED HEALTH CARE EDUCATION/TRAINING PROGRAM

## 2021-05-17 PROCEDURE — A9270 NON-COVERED ITEM OR SERVICE: HCPCS | Performed by: STUDENT IN AN ORGANIZED HEALTH CARE EDUCATION/TRAINING PROGRAM

## 2021-05-17 PROCEDURE — 94660 CPAP INITIATION&MGMT: CPT

## 2021-05-17 PROCEDURE — 36415 COLL VENOUS BLD VENIPUNCTURE: CPT

## 2021-05-17 PROCEDURE — 94760 N-INVAS EAR/PLS OXIMETRY 1: CPT

## 2021-05-17 PROCEDURE — 82962 GLUCOSE BLOOD TEST: CPT | Mod: 91

## 2021-05-17 RX ADMIN — MONTELUKAST 10 MG: 10 TABLET, FILM COATED ORAL at 21:28

## 2021-05-17 RX ADMIN — WARFARIN SODIUM 9 MG: 6 TABLET ORAL at 16:47

## 2021-05-17 RX ADMIN — FERROUS SULFATE TAB 325 MG (65 MG ELEMENTAL FE) 325 MG: 325 (65 FE) TAB at 08:05

## 2021-05-17 RX ADMIN — ATORVASTATIN CALCIUM 20 MG: 20 TABLET, FILM COATED ORAL at 16:47

## 2021-05-17 RX ADMIN — FUROSEMIDE 20 MG: 40 TABLET ORAL at 16:47

## 2021-05-17 RX ADMIN — POTASSIUM CHLORIDE 20 MEQ: 1500 TABLET, EXTENDED RELEASE ORAL at 05:43

## 2021-05-17 RX ADMIN — LITHIUM CARBONATE 600 MG: 300 TABLET, FILM COATED, EXTENDED RELEASE ORAL at 16:47

## 2021-05-17 RX ADMIN — FLUTICASONE PROPIONATE 50 MCG: 50 SPRAY, METERED NASAL at 21:28

## 2021-05-17 RX ADMIN — FLUTICASONE PROPIONATE 50 MCG: 50 SPRAY, METERED NASAL at 08:05

## 2021-05-17 RX ADMIN — TIOTROPIUM BROMIDE INHALATION SPRAY 5 MCG: 3.12 SPRAY, METERED RESPIRATORY (INHALATION) at 08:05

## 2021-05-17 RX ADMIN — ARIPIPRAZOLE 30 MG: 10 TABLET ORAL at 05:42

## 2021-05-17 RX ADMIN — LITHIUM CARBONATE 300 MG: 300 TABLET, FILM COATED, EXTENDED RELEASE ORAL at 05:43

## 2021-05-17 ASSESSMENT — ENCOUNTER SYMPTOMS
HEARTBURN: 0
FLANK PAIN: 0
SPUTUM PRODUCTION: 0
COUGH: 0
SORE THROAT: 0
ORTHOPNEA: 0
ABDOMINAL PAIN: 0
HEMOPTYSIS: 0
PHOTOPHOBIA: 0
FEVER: 0
CLAUDICATION: 0
NAUSEA: 0
SPEECH CHANGE: 0
BLURRED VISION: 0
DIZZINESS: 0
VOMITING: 0
CHILLS: 0
FALLS: 1
PALPITATIONS: 0
SHORTNESS OF BREATH: 1
SENSORY CHANGE: 0
DIARRHEA: 0
WHEEZING: 0
BRUISES/BLEEDS EASILY: 0
BACK PAIN: 0
SHORTNESS OF BREATH: 0
BLOOD IN STOOL: 0
DOUBLE VISION: 0
DEPRESSION: 0
WEAKNESS: 1
HEADACHES: 0
MYALGIAS: 0
EYE DISCHARGE: 0

## 2021-05-17 ASSESSMENT — FIBROSIS 4 INDEX: FIB4 SCORE: 0.6

## 2021-05-17 ASSESSMENT — COGNITIVE AND FUNCTIONAL STATUS - GENERAL
DAILY ACTIVITIY SCORE: 22
HELP NEEDED FOR BATHING: A LITTLE
DRESSING REGULAR LOWER BODY CLOTHING: A LITTLE
SUGGESTED CMS G CODE MODIFIER DAILY ACTIVITY: CJ

## 2021-05-17 ASSESSMENT — PAIN DESCRIPTION - PAIN TYPE
TYPE: ACUTE PAIN
TYPE: ACUTE PAIN

## 2021-05-17 NOTE — DISCHARGE PLANNING
[facility]    Follow up for Rehab.  Would welcome PT/OT updates to reflect current function/mobility.  PMR consult pended while waiting for additional information.  TCC following.

## 2021-05-17 NOTE — DISCHARGE PLANNING
Anticipated Discharge Disposition: Home with HH    Action: RN CM spoke with patient at bedside and provided update that the previously chosen HH agencies have all denied.  Patient gave the ok to try all other [place] [facility] agencies.  Choice form faxed to GISELE [facility].    Barriers to Discharge: Medicaid FFS, placement     Plan: Case coordination to continue to follow up with medical team to discuss discharge barriers.

## 2021-05-17 NOTE — PROGRESS NOTES
[facility] Medicine Daily Progress Note    Date of Service  5/17/2021    Chief Complaint  45 y.o. female admitted 5/8/2021 with shortness of breath     Hospital Course    The patient is a 45-year-old female with a past medical history of COPD on 3L/min, schizophrenia, bipolar disorder, and morbid obesity who presents to the ED with a chief complaint of worsening shortness of breath.    D-dimer was not elevated   NT proBNP 457. Procal neg  Patient likely with COPD exacerbation/KATHIA/OHS. Still smoking.   RUE swelling, pain. Doppler: no DVT.  PT/OT rec post acute. However due to insurance, all SNF declined. Patient is severe debilitated, unable to even hold plates, Rehab referred.    Medically clear, await placement. Rehab vs. C    Interval Problem Update  The patient was seen and evaluated at bedside.   Patient was sitting on the chair, reports feeling better and ambulation more.   Denies fever, chills, chest pain, sob, n/v/abd pain  On 2L O2, which is her baseline O2    All Data, Medication data reviewed.  Case discussed with nursing, CM,  nurse, pharmacy in IDT rounds.   Difficult placement due to insurance issue    Consultants/Specialty  None    Code Status  Full Code    Disposition  Pending placement    Review of Systems  Review of Systems   Constitutional: Positive for malaise/fatigue. Negative for chills and fever.   HENT: Negative for congestion, hearing loss, sore throat and tinnitus.    Eyes: Negative for blurred vision, double vision, photophobia and discharge.   Respiratory: Negative for cough, hemoptysis, sputum production, shortness of breath and wheezing.    Cardiovascular: Negative for chest pain, palpitations, orthopnea, claudication and leg swelling.   Gastrointestinal: Negative for abdominal pain, blood in stool, diarrhea, heartburn, melena, nausea and vomiting.   Genitourinary: Negative for dysuria, flank pain, hematuria and urgency.   Musculoskeletal: Positive for falls and joint pain (RUE  pain and swelling). Negative for back pain and myalgias.   Skin: Negative for itching and rash.   Neurological: Positive for weakness. Negative for dizziness, sensory change, speech change and headaches.   Endo/Heme/Allergies: Does not bruise/bleed easily.   Psychiatric/Behavioral: Negative for depression and suicidal ideas.        Physical Exam  Temp:  [36.1 °C (96.9 °F)-36.6 °C (97.9 °F)] 36.6 °C (97.9 °F)  Pulse:  [50-61] 61  Resp:  [17-19] 19  BP: (101-124)/(47-60) 104/47  SpO2:  [96 %-100 %] 97 %    Physical Exam  Vitals and nursing note reviewed.   Constitutional:       General: She is not in acute distress.     Appearance: Normal appearance. She is obese.   HENT:      Head: Normocephalic and atraumatic.      Mouth/Throat:      Mouth: Mucous membranes are moist.   Eyes:      Extraocular Movements: Extraocular movements intact.      Conjunctiva/sclera: Conjunctivae normal.      Pupils: Pupils are equal, round, and reactive to light.   Cardiovascular:      Rate and Rhythm: Normal rate and regular rhythm.      Heart sounds: No murmur heard.   No friction rub.   Pulmonary:      Effort: Pulmonary effort is normal. No respiratory distress.      Breath sounds: Normal breath sounds. No wheezing.      Comments: On oxygen supplements  Abdominal:      General: Abdomen is flat. Bowel sounds are normal.      Palpations: Abdomen is soft.      Tenderness: There is no abdominal tenderness. There is no guarding.   Musculoskeletal:         General: Swelling (RUE) present. No tenderness. Normal range of motion.      Cervical back: Normal range of motion and neck supple.   Skin:     General: Skin is warm and dry.      Findings: No rash.   Neurological:      General: No focal deficit present.      Mental Status: She is alert and oriented to person, place, and time.      Cranial Nerves: No cranial nerve deficit.      Motor: No weakness.   Psychiatric:         Mood and Affect: Mood normal.         Behavior: Behavior normal.          Fluids    Intake/Output Summary (Last 24 hours) at 5/17/2021 1322  Last data filed at 5/17/2021 0800  Gross per 24 hour   Intake 480 ml   Output 1000 ml   Net -520 ml       Laboratory          Recent Labs     05/15/21  1207 05/16/21  0347 05/17/21  0322   INR 1.60* 1.80* 2.01*               Imaging  US-EXTREMITY VENOUS UPPER UNILAT RIGHT   Final Result      DX-CHEST-PORTABLE (1 VIEW)   Final Result         1. No acute cardiopulmonary abnormalities are identified.           Assessment/Plan  * Acute on chronic respiratory failure (HCC)- (present on admission)  Assessment & Plan  Multifactorial, likely sec 2/2 copd and obesity hypoventilation syndrome, KATHIA  's  Dimer negative  Obtain procal neg  Cont duonebs, home inhalers, and steroids and O2 support  Lasix  IS    KATHIA and COPD overlap syndrome (HCC)- (present on admission)  Assessment & Plan  With COPD exacerbation this admission  Continue home inhalers  Pulse ox and oxygen support, 3L required in ED.  Maintain >93%  Smoking cessation counseling 12 minutes  Consider nightly cpap  Most likely copd exacerbation: duonebs, home inhalers, and solumedrol 62.5mb BID ordered, taper steroids and O2 support commensurate with recovery.        Elevated brain natriuretic peptide (BNP) level- (present on admission)  Assessment & Plan  Chronic  >500 upon this admission. Ranges from 200-500 on previous labs within past year.  Low suspicion for CHF  Last Echo performed 3/20/21: Prior echocardiogram 1/18/2021. No changes.  Normal left ventricular systolic function. Left ventricular ejection fraction is visually estimated to be 60%. The right ventricle was normal in size and function.  On lasix     Morbid obesity (HCC)- (present on admission)  Assessment & Plan  Continue to  on lifestyle modifications to include diet and exercise. Outpatient bariatric surgery evaluation recommended    Chest pain, rule out acute myocardial infarction- (present on  admission)  Assessment & Plan  Trop trending neg  EKG: NSR, no acute ST-T changes  Reports chest pain resolved    Debilitated  Assessment & Plan  Severe debilitated  PT/OT  Encourage ambulation  PMR referral    Leukocytosis- (present on admission)  Assessment & Plan  Procal neg.   Mild, Likely reactive, sec to steroid use  Cont to monitor    Bipolar Disorder- (present on admission)  Assessment & Plan  Continue home medications     Yeast infection of the skin- (present on admission)  Assessment & Plan  Continue nystatin to intertriginous areas including under breasts and crux    Paroxysmal atrial fibrillation (HCC)- (present on admission)  Assessment & Plan  On coumadin, will continue with Coumadin. I coordinate with pharmacy for coumadin dosing. INR target 2-3. Closely monitor any sign of bleeding.  Presumed inr goal of 2-3.  Daily INRs        Schizophrenia (HCC)- (present on admission)  Assessment & Plan  Lithium level in AM  Cont. 300mg in AM, 600mg qhs unless levels high  Cont. abilify 30mg qam    Tobacco use- (present on admission)  Assessment & Plan  5 minutes on tobacco cessation counseling provided including nicotine patches, gum, and dangers of smoking. Discussed the risks of smoking including increased risk of heart disease, stroke, cancer and COPD. Discussed the benefits of quitting smoking.       Iron deficiency anemia- (present on admission)  Assessment & Plan  Iron studies and ferriting, c/w ROCÍO  Denies active bleeding  Cont iron supplements       VTE prophylaxis: warfarin

## 2021-05-17 NOTE — CARE PLAN
The patient is Stable - Low risk of patient condition declining or worsening    Shift Goals  Clinical Goals: safe mobilization  Patient Goals: Rest well    Progress made toward(s) clinical / shift goals:  Pt ambulated x3 times around unit. Pt very cooperative today with shift goals. Pt was educated about care/meds.     Patient is not progressing towards the following goals:n/a

## 2021-05-17 NOTE — DISCHARGE PLANNING
ATTN: Case Management  RE: Referral for Home Health    Reason for referral denial: We are unable to accept today due to insurance capacity and are rapidly assessing our census for discharge opportunities.  At this time we are only able to accept referrals with [facility]              Unfortunately, we are not able to accept this referral for the reason listed above. If further clarity is needed, our Transitional Care Specialists are available to discuss any barriers to service at [phone].      We look forward to collaborating with you in the future,  [facility] Team

## 2021-05-17 NOTE — CARE PLAN
Shift Goals: Pt will be able to have restful sleep with CPAP    Problem: Knowledge Deficit - COPD  Goal: Patient/significant other demonstrates understanding of disease process, utilization of the Action Plan, medications and discharge instruction  Outcome: Progressing     Problem: Self Care  Goal: Patient will have the ability to perform ADLs independently or with assistance (bathe, groom, dress, toilet and feed)  Outcome: Progressing     Problem: Mechanical Ventilation  Goal: Safe management of artificial airway and ventilation  Outcome: Progressing  Goal: Successful weaning off mechanical ventilator, spontaneously maintains adequate gas exchange  Outcome: Progressing  Goal: Patient will be able to express needs and understand communication  Outcome: Progressing     Clinical Goals: safe mobilization  Patient Goals: Rest well while on CPAP    Patient is not progressing towards the following goals:

## 2021-05-17 NOTE — PROGRESS NOTES
Inpatient Anticoagulation Service Note    Date: 2021  Reason for Anticoagulation: Atrial Fibrillation   [id number] VASc Score: 1  HAS-BLED Score: 1    Hemoglobin Value: (!) 10.5  Hematocrit Value: (!) 35.7  Lab Platelet Value: 226  Target INR: 2.0 to 3.0    INR from last 7 days     Date/Time INR Value    21 0322  (!) 2.01    21 0347  (!) 1.8    05/15/21 1207  (!) 1.6    21 0606  (!) 1.73    21 0908  (!) 1.68    21 03:41:01  (!) 1.52    21 0339  (!) 1.48        Dose from last 7 days     Date/Time Dose (mg)    21 1543  9    21 1000  9    05/15/21 1100  9    21 1400  7.5    21 1334  6    21 1622  6    21 1417  8        Average Dose (mg): 9  Significant Interactions: Statin, Corticosteroids  Bridge Therapy: No     Reversal Agent Administered: Not Applicable  Comments: no s/sx bleeding noted per chart review    Plan:  Continue warfarin 9mg daily today. INR within goal range. Obtain daily INR to ensure appropriate dosing.  Education Material Provided?: No (on chronic therapy as outpatient)  Pharmacist suggested discharge dosinmg daily with follow up INR within 3-4 days of discharge     David Snider, HildaD

## 2021-05-17 NOTE — DISCHARGE PLANNING
Received Choice form at [phone]  Agency/Facility Name: 1)[facility], 2)[facility], [facility], [facility], [facility], 6)[facility]  Referral sent per Choice form @ [phone]

## 2021-05-17 NOTE — DISCHARGE PLANNING
@5178  Sent referral to 2)[facility]    [facility]Facility Name: [facility]  Spoke To: Arlyn  Outcome: [facility] is not taking Medicaid patients at this time      @0348  Sent referral to 3)Dawna [facility]

## 2021-05-17 NOTE — PROGRESS NOTES
[facility] Medicine Daily Progress Note    Date of Service  5/17/2021    Chief Complaint  45 y.o. female admitted 5/8/2021 with shortness of breath     Hospital Course    The patient is a 45-year-old female with a past medical history of COPD on 3L/min, schizophrenia, bipolar disorder, and morbid obesity who presents to the ED with a chief complaint of worsening shortness of breath.    D-dimer was not elevated   NT proBNP 457. Procal neg  Patient likely with COPD exacerbation/KATHIA/OHS. Still smoking.   RUE swelling, pain. Doppler: no DVT.  PT/OT rec post acute. However due to insurance, all SNF declined. Patient is severe debilitated, unable to even hold plates, Rehab referred.    [facility] ordered, however I doubt she is safe to dc home with [facility] since she lives alone and is even very weak to hold plates.     Interval Problem Update  The patient was seen and evaluated at bedside  Laying bed, lethargic, weak, no chest pain, sob.   She reported RUE swelling and pain. No DVT on doppler.  On 2L O2, which is her baseline O2    All Data, Medication data reviewed.  Case discussed with nursing, CM,  nurse, pharmacy in IDT rounds.   Difficult placement due to insurance issue    Consultants/Specialty  None    Code Status  Full Code    Disposition  Pending placement    Review of Systems  Review of Systems   Constitutional: Positive for malaise/fatigue. Negative for chills and fever.   HENT: Negative for congestion, hearing loss, sore throat and tinnitus.    Eyes: Negative for blurred vision, double vision, photophobia and discharge.   Respiratory: Positive for shortness of breath. Negative for cough, hemoptysis, sputum production and wheezing.    Cardiovascular: Negative for chest pain, palpitations, orthopnea, claudication and leg swelling.   Gastrointestinal: Negative for abdominal pain, blood in stool, diarrhea, heartburn, melena, nausea and vomiting.   Genitourinary: Negative for dysuria, flank pain, hematuria and urgency.    Musculoskeletal: Positive for falls and joint pain (RUE pain and swelling). Negative for back pain and myalgias.   Skin: Negative for itching and rash.   Neurological: Positive for weakness. Negative for dizziness, sensory change, speech change and headaches.   Endo/Heme/Allergies: Does not bruise/bleed easily.   Psychiatric/Behavioral: Negative for depression and suicidal ideas.        Physical Exam  Temp:  [36.1 °C (96.9 °F)-36.6 °C (97.9 °F)] 36.6 °C (97.9 °F)  Pulse:  [50-61] 61  Resp:  [17-19] 19  BP: (101-124)/(47-60) 104/47  SpO2:  [96 %-100 %] 97 %    Physical Exam  Vitals and nursing note reviewed.   Constitutional:       General: She is not in acute distress.     Appearance: Normal appearance. She is obese.   HENT:      Head: Normocephalic and atraumatic.      Mouth/Throat:      Mouth: Mucous membranes are moist.   Eyes:      Extraocular Movements: Extraocular movements intact.      Conjunctiva/sclera: Conjunctivae normal.      Pupils: Pupils are equal, round, and reactive to light.   Cardiovascular:      Rate and Rhythm: Normal rate and regular rhythm.      Heart sounds: No murmur heard.   No friction rub.   Pulmonary:      Effort: Pulmonary effort is normal. No respiratory distress.      Breath sounds: Normal breath sounds. No wheezing.      Comments: On oxygen supplements  Abdominal:      General: Abdomen is flat. Bowel sounds are normal.      Palpations: Abdomen is soft.      Tenderness: There is no abdominal tenderness. There is no guarding.   Musculoskeletal:         General: Swelling (RUE) present. No tenderness. Normal range of motion.      Cervical back: Normal range of motion and neck supple.   Skin:     General: Skin is warm and dry.      Findings: No rash.   Neurological:      General: No focal deficit present.      Mental Status: She is alert and oriented to person, place, and time.      Cranial Nerves: No cranial nerve deficit.      Motor: No weakness.   Psychiatric:         Mood and  Affect: Mood normal.         Behavior: Behavior normal.         Fluids    Intake/Output Summary (Last 24 hours) at 5/17/2021 1320  Last data filed at 5/17/2021 0800  Gross per 24 hour   Intake 480 ml   Output 1000 ml   Net -520 ml       Laboratory          Recent Labs     05/15/21  1207 05/16/21  0347 05/17/21  0322   INR 1.60* 1.80* 2.01*               Imaging  US-EXTREMITY VENOUS UPPER UNILAT RIGHT   Final Result      DX-CHEST-PORTABLE (1 VIEW)   Final Result         1. No acute cardiopulmonary abnormalities are identified.           Assessment/Plan  * Acute on chronic respiratory failure (HCC)- (present on admission)  Assessment & Plan  Multifactorial, likely sec 2/2 copd and obesity hypoventilation syndrome, KATHIA  's  Dimer negative  Obtain procal neg  Cont duonebs, home inhalers, and steroids and O2 support  Lasix  IS    KATHIA and COPD overlap syndrome (HCC)- (present on admission)  Assessment & Plan  With COPD exacerbation this admission  Continue home inhalers  Pulse ox and oxygen support, 3L required in ED.  Maintain >93%  Smoking cessation counseling 12 minutes  Consider nightly cpap  Most likely copd exacerbation: duonebs, home inhalers, and solumedrol 62.5mb BID ordered, taper steroids and O2 support commensurate with recovery.        Elevated brain natriuretic peptide (BNP) level- (present on admission)  Assessment & Plan  Chronic  >500 upon this admission. Ranges from 200-500 on previous labs within past year.  Low suspicion for CHF  Last Echo performed 3/20/21: Prior echocardiogram 1/18/2021. No changes.  Normal left ventricular systolic function. Left ventricular ejection fraction is visually estimated to be 60%. The right ventricle was normal in size and function.  On lasix     Morbid obesity (HCC)- (present on admission)  Assessment & Plan  Continue to  on lifestyle modifications to include diet and exercise. Outpatient bariatric surgery evaluation recommended    Chest pain, rule out  acute myocardial infarction- (present on admission)  Assessment & Plan  Trop trending neg  EKG: NSR, no acute ST-T changes  Reports chest pain resolved    Debilitated  Assessment & Plan  Severe debilitated  PT/OT  Encourage ambulation  PMR referral    Leukocytosis- (present on admission)  Assessment & Plan  Procal neg.   Mild, Likely reactive, sec to steroid use  Cont to monitor    Bipolar Disorder- (present on admission)  Assessment & Plan  Continue home medications     Yeast infection of the skin- (present on admission)  Assessment & Plan  Continue nystatin to intertriginous areas including under breasts and crux    Paroxysmal atrial fibrillation (HCC)- (present on admission)  Assessment & Plan  On coumadin, will continue with Coumadin. I coordinate with pharmacy for coumadin dosing. INR target 2-3. Closely monitor any sign of bleeding.  Presumed inr goal of 2-3.  Daily INRs        Schizophrenia (HCC)- (present on admission)  Assessment & Plan  Lithium level in AM  Cont. 300mg in AM, 600mg qhs unless levels high  Cont. abilify 30mg qam    Tobacco use- (present on admission)  Assessment & Plan  5 minutes on tobacco cessation counseling provided including nicotine patches, gum, and dangers of smoking. Discussed the risks of smoking including increased risk of heart disease, stroke, cancer and COPD. Discussed the benefits of quitting smoking.       Iron deficiency anemia- (present on admission)  Assessment & Plan  Iron studies and ferriting, c/w ROCÍO  Denies active bleeding  Cont iron supplements       VTE prophylaxis: warfarin

## 2021-05-17 NOTE — PROGRESS NOTES
[facility] Medicine Daily Progress Note    Date of Service  5/17/2021    Chief Complaint  45 y.o. female admitted 5/8/2021 with shortness of breath     Hospital Course    The patient is a 45-year-old female with a past medical history of COPD on 3L/min, schizophrenia, bipolar disorder, and morbid obesity who presents to the ED with a chief complaint of worsening shortness of breath.    D-dimer was not elevated   NT proBNP 457. Procal neg  Patient likely with COPD exacerbation/KATHIA/OHS. Still smoking.   RUE swelling, pain. Doppler: no DVT.  PT/OT rec post acute. However due to insurance, all SNF declined. Patient is severe debilitated, unable to even hold plates, Rehab referred.    [facility] ordered, however I doubt she is safe to dc home with [facility] since she lives alone and is even very weak to hold plates.     Interval Problem Update  The patient was seen and evaluated at bedside  Laying bed, lethargic, weak, no chest pain, sob.   She reported RUE swelling and pain. No DVT on doppler.  On 2L O2, which is her baseline O2    All Data, Medication data reviewed.  Case discussed with nursing, CM,  nurse, pharmacy in IDT rounds.   Difficult placement due to insurance issue    Consultants/Specialty  None    Code Status  Full Code    Disposition  Pending placement    Review of Systems  Review of Systems   Constitutional: Positive for malaise/fatigue. Negative for chills and fever.   HENT: Negative for congestion, hearing loss, sore throat and tinnitus.    Eyes: Negative for blurred vision, double vision, photophobia and discharge.   Respiratory: Positive for shortness of breath. Negative for cough, hemoptysis, sputum production and wheezing.    Cardiovascular: Negative for chest pain, palpitations, orthopnea, claudication and leg swelling.   Gastrointestinal: Negative for abdominal pain, blood in stool, diarrhea, heartburn, melena, nausea and vomiting.   Genitourinary: Negative for dysuria, flank pain, hematuria and urgency.    Musculoskeletal: Positive for falls and joint pain (RUE pain and swelling). Negative for back pain and myalgias.   Skin: Negative for itching and rash.   Neurological: Positive for weakness. Negative for dizziness, sensory change, speech change and headaches.   Endo/Heme/Allergies: Does not bruise/bleed easily.   Psychiatric/Behavioral: Negative for depression and suicidal ideas.        Physical Exam  Temp:  [36.1 °C (96.9 °F)-36.6 °C (97.9 °F)] 36.6 °C (97.9 °F)  Pulse:  [50-61] 61  Resp:  [17-19] 19  BP: (101-124)/(47-60) 104/47  SpO2:  [96 %-100 %] 97 %    Physical Exam  Vitals and nursing note reviewed.   Constitutional:       General: She is not in acute distress.     Appearance: Normal appearance. She is obese.   HENT:      Head: Normocephalic and atraumatic.      Mouth/Throat:      Mouth: Mucous membranes are moist.   Eyes:      Extraocular Movements: Extraocular movements intact.      Conjunctiva/sclera: Conjunctivae normal.      Pupils: Pupils are equal, round, and reactive to light.   Cardiovascular:      Rate and Rhythm: Normal rate and regular rhythm.      Heart sounds: No murmur heard.   No friction rub.   Pulmonary:      Effort: Pulmonary effort is normal. No respiratory distress.      Breath sounds: Normal breath sounds. No wheezing.      Comments: On oxygen supplements  Abdominal:      General: Abdomen is flat. Bowel sounds are normal.      Palpations: Abdomen is soft.      Tenderness: There is no abdominal tenderness. There is no guarding.   Musculoskeletal:         General: Swelling (RUE) present. No tenderness. Normal range of motion.      Cervical back: Normal range of motion and neck supple.   Skin:     General: Skin is warm and dry.      Findings: No rash.   Neurological:      General: No focal deficit present.      Mental Status: She is alert and oriented to person, place, and time.      Cranial Nerves: No cranial nerve deficit.      Motor: No weakness.   Psychiatric:         Mood and  Affect: Mood normal.         Behavior: Behavior normal.         Fluids    Intake/Output Summary (Last 24 hours) at 5/17/2021 1321  Last data filed at 5/17/2021 0800  Gross per 24 hour   Intake 480 ml   Output 1000 ml   Net -520 ml       Laboratory          Recent Labs     05/15/21  1207 05/16/21  0347 05/17/21  0322   INR 1.60* 1.80* 2.01*               Imaging  US-EXTREMITY VENOUS UPPER UNILAT RIGHT   Final Result      DX-CHEST-PORTABLE (1 VIEW)   Final Result         1. No acute cardiopulmonary abnormalities are identified.           Assessment/Plan  * Acute on chronic respiratory failure (HCC)- (present on admission)  Assessment & Plan  Multifactorial, likely sec 2/2 copd and obesity hypoventilation syndrome, KATHIA  's  Dimer negative  Obtain procal neg  Cont duonebs, home inhalers, and steroids and O2 support  Lasix  IS    KATHIA and COPD overlap syndrome (HCC)- (present on admission)  Assessment & Plan  With COPD exacerbation this admission  Continue home inhalers  Pulse ox and oxygen support, 3L required in ED.  Maintain >93%  Smoking cessation counseling 12 minutes  Consider nightly cpap  Most likely copd exacerbation: duonebs, home inhalers, and solumedrol 62.5mb BID ordered, taper steroids and O2 support commensurate with recovery.        Elevated brain natriuretic peptide (BNP) level- (present on admission)  Assessment & Plan  Chronic  >500 upon this admission. Ranges from 200-500 on previous labs within past year.  Low suspicion for CHF  Last Echo performed 3/20/21: Prior echocardiogram 1/18/2021. No changes.  Normal left ventricular systolic function. Left ventricular ejection fraction is visually estimated to be 60%. The right ventricle was normal in size and function.  On lasix     Morbid obesity (HCC)- (present on admission)  Assessment & Plan  Continue to  on lifestyle modifications to include diet and exercise. Outpatient bariatric surgery evaluation recommended    Chest pain, rule out  acute myocardial infarction- (present on admission)  Assessment & Plan  Trop trending neg  EKG: NSR, no acute ST-T changes  Reports chest pain resolved    Debilitated  Assessment & Plan  Severe debilitated  PT/OT  Encourage ambulation  PMR referral    Leukocytosis- (present on admission)  Assessment & Plan  Procal neg.   Mild, Likely reactive, sec to steroid use  Cont to monitor    Bipolar Disorder- (present on admission)  Assessment & Plan  Continue home medications     Yeast infection of the skin- (present on admission)  Assessment & Plan  Continue nystatin to intertriginous areas including under breasts and crux    Paroxysmal atrial fibrillation (HCC)- (present on admission)  Assessment & Plan  On coumadin, will continue with Coumadin. I coordinate with pharmacy for coumadin dosing. INR target 2-3. Closely monitor any sign of bleeding.  Presumed inr goal of 2-3.  Daily INRs        Schizophrenia (HCC)- (present on admission)  Assessment & Plan  Lithium level in AM  Cont. 300mg in AM, 600mg qhs unless levels high  Cont. abilify 30mg qam    Tobacco use- (present on admission)  Assessment & Plan  5 minutes on tobacco cessation counseling provided including nicotine patches, gum, and dangers of smoking. Discussed the risks of smoking including increased risk of heart disease, stroke, cancer and COPD. Discussed the benefits of quitting smoking.       Iron deficiency anemia- (present on admission)  Assessment & Plan  Iron studies and ferriting, c/w ROCÍO  Denies active bleeding  Cont iron supplements       VTE prophylaxis: warfarin

## 2021-05-17 NOTE — PROGRESS NOTES
Bedside report received from nurse. Assumed care of pt. Pt resting comfortably in bed. A/Ox4, VSS,  All needs met. POC reviewed and white board updated. Pt is medical. Call light in reach. Bed locked in lowest position with 2 upper bed rails up. No pain or complaints

## 2021-05-17 NOTE — THERAPY
"Occupational Therapy  Daily Treatment     Patient Name: Lorene Haro  Age:  45 y.o., Sex:  female  Medical Record #: [id number]  Today's Date: 5/17/2021     Precautions: Fall Risk  Comments: increased anxiety w/activities     Assessment  Pt seen for OT tx and demonstrating significant improvement in participation w/self care. Pt is able to manage toileting and ketty care w/spv, pts thoroughness is limited by her body habits w/both bathing and toileting. Pt completed seated shower and primarily needed assist for feet and her back. Pt did have increased WOB during shower and notable tremor and actively reported being fearful of falling during activity. Pt would benefit from in home assistance w/bathing. OT will follow at a distance in this setting, to address use of AE and modifications to bathing, but her primarily limitations are w/her anxiety and skin/weight issues.     Plan  Treatment plan modified to 2 times per week until therapy goals are met for the following treatments:  Adaptive Equipment and Self Care/Activities of Daily Living.    DC Equipment Recommendations: Tub Transfer Bench  Discharge Recommendations: Recommend home health for continued occupational therapy services    Subjective  \"I am doing so much better now\"      Objective   05/17/21 1536   Pain 0 - 10 Group   Therapist Pain Assessment During Activity;Nurse Notified;0   Cognition    Cognition / Consciousness WDL   Level of Consciousness Alert   Comments very pleasant and motivated, did have increased anxiousness w/showering activity reports fearful of falling    Strength Upper Body   Upper Body Strength  WDL   Other Treatments   Other Treatments Provided Focused on ketty care and full seated shower    Balance   Sitting Balance (Static) Fair   Sitting Balance (Dynamic) Fair   Standing Balance (Static) Fair   Standing Balance (Dynamic) Fair   Weight Shift Sitting Fair   Weight Shift Standing Fair   Skilled Intervention Verbal Cuing;Tactile Cuing " "  Comments w/fww    Bed Mobility    Sit to Supine Supervised   Skilled Intervention Verbal Cuing;Tactile Cuing   Comments up to chair post session    Activities of Daily Living   Grooming Supervision;Standing   Bathing Minimal Assist   Upper Body Dressing Supervision   Lower Body Dressing Minimal Assist   Toileting Supervision   Skilled Intervention Verbal Cuing;Tactile Cuing;Compensatory Strategies   Comments primarily needs assist d/t fatigue and incresed effort d/t body habitus    How much help from another person does the patient currently need...   6 Clicks Daily Activity Score 22   Functional Mobility   Sit to Stand Supervised   Bed, Chair, Wheelchair Transfer Supervised   Toilet Transfers Supervised   Mobility walking in room w/fww    Skilled Intervention Verbal Cuing;Tactile Cuing;Facilitation;Compensatory Strategies   Visual Perception   Visual Perception  Not Tested   Activity Tolerance   Comments increased WOB w/activity but O2 stats stable appers to be related to increased anxiety    Patient / Family Goals   Patient / Family Goal #1 \"To be able to take a shower\"   Goal #1 Outcome Goal met   Short Term Goals   Short Term Goal # 1 Pt will complete standing grooming at sink with supv    Goal Outcome # 1 Goal met   Short Term Goal # 2 Pt will complete LB dressing with supv using AE   Goal Outcome # 2 Progressing as expected   Short Term Goal # 3 Pt will stand >15 min with supv to complete ADL without rest break    Goal Outcome # 3 Goal met   Education Group   Role of Occupational Therapist Patient Response Patient;Acceptance;Explanation;Verbal Demonstration   Energy Conservation Patient Response Patient;Acceptance;Explanation;Demonstration;Handout;Teach Back;Verbal Demonstration;Action Demonstration   ADL Patient Response Patient;Acceptance;Explanation;Handout;Demonstration;Teach Back;Verbal Demonstration;Action Demonstration   Anticipated Discharge Equipment and Recommendations   DC Equipment " Recommendations Tub Transfer Bench   Discharge Recommendations Recommend home health for continued occupational therapy services   Interdisciplinary Plan of Care Collaboration   IDT Collaboration with  Nursing   Patient Position at End of Therapy Seated;Chair Alarm On;Call Light within Reach;Tray Table within Reach;Phone within Reach   Collaboration Comments RN aware of OT tx and pts efforts    Session Information   Date / Session Number  5/17 #3 (1/2, 5/23)   Priority 2

## 2021-05-18 ENCOUNTER — HOME HEALTH ADMISSION (OUTPATIENT)
Dept: HOME HEALTH SERVICES | Facility: HOME HEALTHCARE | Age: 46
End: 2021-05-18
Payer: MEDICAID

## 2021-05-18 VITALS
WEIGHT: 291.67 LBS | TEMPERATURE: 98.2 F | HEIGHT: 61 IN | OXYGEN SATURATION: 97 % | RESPIRATION RATE: 18 BRPM | BODY MASS INDEX: 55.07 KG/M2 | DIASTOLIC BLOOD PRESSURE: 44 MMHG | SYSTOLIC BLOOD PRESSURE: 95 MMHG | HEART RATE: 79 BPM

## 2021-05-18 LAB
ANION GAP SERPL CALC-SCNC: 6 MMOL/L (ref 7–16)
BASOPHILS # BLD AUTO: 0 % (ref 0–1.8)
BASOPHILS # BLD: 0 K/UL (ref 0–0.12)
BUN SERPL-MCNC: 18 MG/DL (ref 8–22)
CALCIUM SERPL-MCNC: 9.2 MG/DL (ref 8.5–10.5)
CHLORIDE SERPL-SCNC: 103 MMOL/L (ref 96–112)
CO2 SERPL-SCNC: 30 MMOL/L (ref 20–33)
CREAT SERPL-MCNC: 0.8 MG/DL (ref 0.5–1.4)
EOSINOPHIL # BLD AUTO: 0.08 K/UL (ref 0–0.51)
EOSINOPHIL NFR BLD: 0.8 % (ref 0–6.9)
ERYTHROCYTE [DISTWIDTH] IN BLOOD BY AUTOMATED COUNT: 58 FL (ref 35.9–50)
GLUCOSE BLD-MCNC: 79 MG/DL (ref 65–99)
GLUCOSE BLD-MCNC: 93 MG/DL (ref 65–99)
GLUCOSE SERPL-MCNC: 75 MG/DL (ref 65–99)
HCT VFR BLD AUTO: 37.7 % (ref 37–47)
HGB BLD-MCNC: 11.2 G/DL (ref 12–16)
IMM GRANULOCYTES # BLD AUTO: 0.06 K/UL (ref 0–0.11)
IMM GRANULOCYTES NFR BLD AUTO: 0.6 % (ref 0–0.9)
INR PPP: 2.12 (ref 0.87–1.13)
LYMPHOCYTES # BLD AUTO: 2.03 K/UL (ref 1–4.8)
LYMPHOCYTES NFR BLD: 20.5 % (ref 22–41)
MCH RBC QN AUTO: 27.4 PG (ref 27–33)
MCHC RBC AUTO-ENTMCNC: 29.7 G/DL (ref 33.6–35)
MCV RBC AUTO: 92.2 FL (ref 81.4–97.8)
MONOCYTES # BLD AUTO: 0.6 K/UL (ref 0–0.85)
MONOCYTES NFR BLD AUTO: 6.1 % (ref 0–13.4)
NEUTROPHILS # BLD AUTO: 7.13 K/UL (ref 2–7.15)
NEUTROPHILS NFR BLD: 72 % (ref 44–72)
NRBC # BLD AUTO: 0 K/UL
NRBC BLD-RTO: 0 /100 WBC
PLATELET # BLD AUTO: 238 K/UL (ref 164–446)
PMV BLD AUTO: 12.2 FL (ref 9–12.9)
POTASSIUM SERPL-SCNC: 4 MMOL/L (ref 3.6–5.5)
PROTHROMBIN TIME: 24.4 SEC (ref 12–14.6)
RBC # BLD AUTO: 4.09 M/UL (ref 4.2–5.4)
SODIUM SERPL-SCNC: 139 MMOL/L (ref 135–145)
WBC # BLD AUTO: 9.9 K/UL (ref 4.8–10.8)

## 2021-05-18 PROCEDURE — 94640 AIRWAY INHALATION TREATMENT: CPT

## 2021-05-18 PROCEDURE — 94760 N-INVAS EAR/PLS OXIMETRY 1: CPT

## 2021-05-18 PROCEDURE — 97116 GAIT TRAINING THERAPY: CPT

## 2021-05-18 PROCEDURE — 36415 COLL VENOUS BLD VENIPUNCTURE: CPT

## 2021-05-18 PROCEDURE — A9270 NON-COVERED ITEM OR SERVICE: HCPCS | Performed by: STUDENT IN AN ORGANIZED HEALTH CARE EDUCATION/TRAINING PROGRAM

## 2021-05-18 PROCEDURE — G0378 HOSPITAL OBSERVATION PER HR: HCPCS

## 2021-05-18 PROCEDURE — 700102 HCHG RX REV CODE 250 W/ 637 OVERRIDE(OP): Performed by: STUDENT IN AN ORGANIZED HEALTH CARE EDUCATION/TRAINING PROGRAM

## 2021-05-18 PROCEDURE — 99217 PR OBSERVATION CARE DISCHARGE: CPT | Performed by: STUDENT IN AN ORGANIZED HEALTH CARE EDUCATION/TRAINING PROGRAM

## 2021-05-18 PROCEDURE — 85025 COMPLETE CBC W/AUTO DIFF WBC: CPT

## 2021-05-18 PROCEDURE — 80048 BASIC METABOLIC PNL TOTAL CA: CPT

## 2021-05-18 PROCEDURE — 82962 GLUCOSE BLOOD TEST: CPT

## 2021-05-18 RX ADMIN — POTASSIUM CHLORIDE 20 MEQ: 1500 TABLET, EXTENDED RELEASE ORAL at 04:56

## 2021-05-18 RX ADMIN — FUROSEMIDE 20 MG: 40 TABLET ORAL at 04:56

## 2021-05-18 RX ADMIN — ARIPIPRAZOLE 30 MG: 10 TABLET ORAL at 04:55

## 2021-05-18 RX ADMIN — LITHIUM CARBONATE 300 MG: 300 TABLET, FILM COATED, EXTENDED RELEASE ORAL at 04:55

## 2021-05-18 RX ADMIN — FERROUS SULFATE TAB 325 MG (65 MG ELEMENTAL FE) 325 MG: 325 (65 FE) TAB at 08:24

## 2021-05-18 RX ADMIN — METOPROLOL SUCCINATE 25 MG: 25 TABLET, EXTENDED RELEASE ORAL at 04:55

## 2021-05-18 RX ADMIN — FLUTICASONE PROPIONATE 50 MCG: 50 SPRAY, METERED NASAL at 08:24

## 2021-05-18 RX ADMIN — TIOTROPIUM BROMIDE INHALATION SPRAY 5 MCG: 3.12 SPRAY, METERED RESPIRATORY (INHALATION) at 07:50

## 2021-05-18 ASSESSMENT — GAIT ASSESSMENTS
GAIT LEVEL OF ASSIST: SUPERVISED
DEVIATION: SHUFFLED GAIT;BRADYKINETIC
ASSISTIVE DEVICE: FRONT WHEEL WALKER
DISTANCE (FEET): 25

## 2021-05-18 ASSESSMENT — COGNITIVE AND FUNCTIONAL STATUS - GENERAL
SUGGESTED CMS G CODE MODIFIER MOBILITY: CK
MOVING FROM LYING ON BACK TO SITTING ON SIDE OF FLAT BED: A LITTLE
MOVING TO AND FROM BED TO CHAIR: A LITTLE
MOBILITY SCORE: 19
WALKING IN HOSPITAL ROOM: A LITTLE
TURNING FROM BACK TO SIDE WHILE IN FLAT BAD: A LITTLE
CLIMB 3 TO 5 STEPS WITH RAILING: A LITTLE

## 2021-05-18 NOTE — THERAPY
Physical Therapy   Daily Treatment     Patient Name: Lorene Haro  Age:  45 y.o., Sex:  female  Medical Record #: [id number]  Today's Date: 5/18/2021     Precautions: Fall Risk    Assessment    Pt seen for follow up PT tx. Pt appears to be approaching her prior level of function ambulating with FWW for short household distances. Pt was limited this session due to stomach pain- RN made aware. Pt may benefit from community .     Plan    Continue current treatment plan.    DC Equipment Recommendations: None  Discharge Recommendations: Recommend home health for continued physical therapy services       05/18/21 0947   Vitals   O2 (LPM) 2.5   O2 Delivery Device Silicone Nasal Cannula   Other Treatments   Other Treatments Provided pt refused to ambulate further due to stomach pain   Gait Analysis   Gait Level Of Assist Supervised   Assistive Device Front Wheel Walker   Distance (Feet) 25   # of Times Distance was Traveled 2   Deviation Shuffled Gait;Bradykinetic   # of Stairs Climbed 0   Weight Bearing Status no restrictions   Skilled Intervention Verbal Cuing   Comments no LOB, limited by stomach pain   Bed Mobility    Supine to Sit Supervised   Sit to Supine Supervised   Scooting Supervised   Rolling Supervised   Skilled Intervention Verbal Cuing   Functional Mobility   Sit to Stand Supervised   Bed, Chair, Wheelchair Transfer Supervised   Toilet Transfers Supervised   Transfer Method Stand Step   Mobility in room with FWW   Skilled Intervention Verbal Cuing   Short Term Goals    Short Term Goal # 1 pt will go supine<>sit w/hob flat and rails down I in 6tx for safe d/c home    Goal Outcome # 1 Progressing as expected   Short Term Goal # 2 pt will go sit<>stand w/fww w/ SPV in 6tx for safe d/c home    Goal Outcome # 2 Goal met   Short Term Goal # 3 pt will transfer bed<>chair w/fww w/SPV in 6tx for safe d/c home    Goal Outcome # 3 Goal met   Short Term Goal # 4 pt will ambulate for 150ft w/fww w/SPV in  6tx for safe d/c home    Goal Outcome # 4 Goal not met   Anticipated Discharge Equipment and Recommendations   DC Equipment Recommendations None   Discharge Recommendations Recommend home health for continued physical therapy services

## 2021-05-18 NOTE — CARE PLAN
Problem: Respiratory  Goal: Patient will achieve/maintain optimum respiratory ventilation and gas exchange  Note: Patient encouraged to turn, cough, and deep breathe frequently. Lung sounds, oxygenation status, and work of breathing assessed and changes in assessment findings addressed.     Problem: Mobility  Goal: Patient's capacity to carry out activities will improve  Note: Patient encouraged to sit in chair and assisted in doing so. PT/OT following.

## 2021-05-18 NOTE — DISCHARGE PLANNING
ATTN: Case Management  RE: Referral for Home Health    As of 05/18/2021, we have accepted the Home Health referral for the patient listed above.    A [facility] clinician will be out to see the patient within 48 hours. If you have any questions or concerns regarding the patient's transition to Home Health, please do not hesitate to contact us at [phone].      We look forward to collaborating with you,  [facility] Health Team

## 2021-05-18 NOTE — DISCHARGE PLANNING
[facility] , [facility] .    3501- Referral sent per choice to [facility].

## 2021-05-18 NOTE — PROGRESS NOTES
Assumed care of PT A&O 4. Pt resting in bed with no signs of labored breathing. On R2L NC. Pt is medical. Call light within reach, bed in lowest position, upper bed rails up. Pt was updated on plan of care for the day . Will continue to monitor.

## 2021-05-18 NOTE — DISCHARGE INSTRUCTIONS
Discharge Instructions    Discharged to home by car with relative. Discharged via wheelchair, hospital escort: Yes.  Special equipment needed: Not Applicable    Be sure to schedule a follow-up appointment with your primary care doctor or any specialists as instructed.     Discharge Plan:        I understand that a diet low in cholesterol, fat, and sodium is recommended for good health. Unless I have been given specific instructions below for another diet, I accept this instruction as my diet prescription.   Other diet: diabetic    Special Instructions: None    · Is patient discharged on Warfarin / Coumadin?   Yes    You are receiving the drug warfarin. Please understand the importance of monitoring warfarin with scheduled PT/INR blood draws.  Follow-up with a call to your personal Doctor's office in 3 days to schedule a PT/INR. .    IMPORTANT: HOW TO USE THIS INFORMATION:  This is a summary and does NOT have all possible information about this product. This information does not assure that this product is safe, effective, or appropriate for you. This information is not individual medical advice and does not substitute for the advice of your health care professional. Always ask your health care professional for complete information about this product and your specific health needs.      WARFARIN - ORAL (WARF-uh-rin)      COMMON BRAND NAME(S): Coumadin      WARNING:  Warfarin can cause very serious (possibly fatal) bleeding. This is more likely to occur when you first start taking this medication or if you take too much warfarin. To decrease your risk for bleeding, your doctor or other health care provider will monitor you closely and check your lab results (INR test) to make sure you are not taking too much warfarin. Keep all medical and laboratory appointments. Tell your doctor right away if you notice any signs of serious bleeding. See also Side Effects section.      USES:  This medication is used to treat blood  "clots (such as in deep vein thrombosis-DVT or pulmonary embolus-PE) and/or to prevent new clots from forming in your body. Preventing harmful blood clots helps to reduce the risk of a stroke or heart attack. Conditions that increase your risk of developing blood clots include a certain type of irregular heart rhythm (atrial fibrillation), heart valve replacement, recent heart attack, and certain surgeries (such as hip/knee replacement). Warfarin is commonly called a \"blood thinner,\" but the more correct term is \"anticoagulant.\" It helps to keep blood flowing smoothly in your body by decreasing the amount of certain substances (clotting proteins) in your blood.      HOW TO USE:  Read the Medication Guide provided by your pharmacist before you start taking warfarin and each time you get a refill. If you have any questions, ask your doctor or pharmacist. Take this medication by mouth with or without food as directed by your doctor or other health care professional, usually once a day. It is very important to take it exactly as directed. Do not increase the dose, take it more frequently, or stop using it unless directed by your doctor. Dosage is based on your medical condition, laboratory tests (such as INR), and response to treatment. Your doctor or other health care provider will monitor you closely while you are taking this medication to determine the right dose for you. Use this medication regularly to get the most benefit from it. To help you remember, take it at the same time each day. It is important to eat a balanced, consistent diet while taking warfarin. Some foods can affect how warfarin works in your body and may affect your treatment and dose. Avoid sudden large increases or decreases in your intake of foods high in vitamin K (such as broccoli, cauliflower, cabbage, brussels sprouts, kale, spinach, and other green leafy vegetables, liver, green tea, certain vitamin supplements). If you are trying to lose " weight, check with your doctor before you try to go on a diet. Cranberry products may also affect how your warfarin works. Limit the amount of cranberry juice (16 ounces/480 milliliters a day) or other cranberry products you may drink or eat.      SIDE EFFECTS:  Nausea, loss of appetite, or stomach/abdominal pain may occur. If any of these effects persist or worsen, tell your doctor or pharmacist promptly. Remember that your doctor has prescribed this medication because he or she has judged that the benefit to you is greater than the risk of side effects. Many people using this medication do not have serious side effects. This medication can cause serious bleeding if it affects your blood clotting proteins too much (shown by unusually high INR lab results). Even if your doctor stops your medication, this risk of bleeding can continue for up to a week. Tell your doctor right away if you have any signs of serious bleeding, including: unusual pain/swelling/discomfort, unusual/easy bruising, prolonged bleeding from cuts or gums, persistent/frequent nosebleeds, unusually heavy/prolonged menstrual flow, pink/dark urine, coughing up blood, vomit that is bloody or looks like coffee grounds, severe headache, dizziness/fainting, unusual or persistent tiredness/weakness, bloody/black/tarry stools, chest pain, shortness of breath, difficulty swallowing. Tell your doctor right away if any of these unlikely but serious side effects occur: persistent nausea/vomiting, severe stomach/abdominal pain, yellowing eyes/skin. This drug rarely has caused very serious (possibly fatal) problems if its effects lead to small blood clots (usually at the beginning of treatment). This can lead to severe skin/tissue damage that may require surgery or amputation if left untreated. Patients with certain blood conditions (protein C or S deficiency) may be at greater risk. Get medical help right away if any of these rare but serious side effects  occur: painful/red/purplish patches on the skin (such as on the toe, breast, abdomen), change in the amount of urine, vision changes, confusion, slurred speech, weakness on one side of the body. A very serious allergic reaction to this drug is rare. However, get medical help right away if you notice any symptoms of a serious allergic reaction, including: rash, itching/swelling (especially of the face/tongue/throat), severe dizziness, trouble breathing. This is not a complete list of possible side effects. If you notice other effects not listed above, contact your doctor or pharmacist. In the [place] - Call your doctor for medical advice about side effects. You may report side effects to FDA at 2-683-CVP-3431. In [place] - Call your doctor for medical advice about side effects. You may report side effects to [facility] [place] at 1-406.678.8798.      PRECAUTIONS:  Before taking warfarin, tell your doctor or pharmacist if you are allergic to it; or if you have any other allergies. This product may contain inactive ingredients, which can cause allergic reactions or other problems. Talk to your pharmacist for more details. Before using this medication, tell your doctor or pharmacist your medical history, especially of: blood disorders (such as anemia, hemophilia), bleeding problems (such as bleeding of the stomach/intestines, bleeding in the brain), blood vessel disorders (such as aneurysms), recent major injury/surgery, liver disease, alcohol use, mental/mood disorders (including memory problems), frequent falls/injuries. It is important that all your doctors and dentists know that you take warfarin. Before having surgery or any medical/dental procedures, tell your doctor or dentist that you are taking this medication and about all the products you use (including prescription drugs, nonprescription drugs, and herbal products). Avoid getting injections into the muscles. If you must have an injection into a muscle (for example, a  flu shot), it should be given in the arm. This way, it will be easier to check for bleeding and/or apply pressure bandages. This medication may cause stomach bleeding. Daily use of alcohol while using this medicine will increase your risk for stomach bleeding and may also affect how this medication works. Limit or avoid alcoholic beverages. If you have not been eating well, if you have an illness or infection that causes fever, vomiting, or diarrhea for more than 2 days, or if you start using any antibiotic medications, contact your doctor or pharmacist immediately because these conditions can affect how warfarin works. This medication can cause heavy bleeding. To lower the chance of getting cut, bruised, or injured, use great caution with sharp objects like safety razors and nail cutters. Use an electric razor when shaving and a soft toothbrush when brushing your teeth. Avoid activities such as contact sports. If you fall or injure yourself, especially if you hit your head, call your doctor immediately. Your doctor may need to check you. The Food & Drug Administration has stated that generic warfarin products are interchangeable. However, consult your doctor or pharmacist before switching warfarin products. Be careful not to take more than one medication that contains warfarin unless specifically directed by the doctor or health care provider who is monitoring your warfarin treatment. Older adults may be at greater risk for bleeding while using this drug. This medication is not recommended for use during pregnancy because of serious (possibly fatal) harm to an unborn baby. Discuss the use of reliable forms of birth control with your doctor. If you become pregnant or think you may be pregnant, tell your doctor immediately. If you are planning pregnancy, discuss a plan for managing your condition with your doctor before you become pregnant. Your doctor may switch the type of medication you use during pregnancy. Very  "small amounts of this medication may pass into breast milk but is unlikely to harm a nursing infant. Consult your doctor before breast-feeding.      DRUG INTERACTIONS:  Drug interactions may change how your medications work or increase your risk for serious side effects. This document does not contain all possible drug interactions. Keep a list of all the products you use (including prescription/nonprescription drugs and herbal products) and share it with your doctor and pharmacist. Do not start, stop, or change the dosage of any medicines without your doctor's approval. Warfarin interacts with many prescription, nonprescription, vitamin, and herbal products. This includes medications that are applied to the skin or inside the vagina or rectum. The interactions with warfarin usually result in an increase or decrease in the \"blood-thinning\" (anticoagulant) effect. Your doctor or other health care professional should closely monitor you to prevent serious bleeding or clotting problems. While taking warfarin, it is very important to tell your doctor or pharmacist of any changes in medications, vitamins, or herbal products that you are taking. Some products that may interact with this drug include: capecitabine, imatinib, mifepristone. Aspirin, aspirin-like drugs (salicylates), and nonsteroidal anti-inflammatory drugs (NSAIDs such as ibuprofen, naproxen, celecoxib) may have effects similar to warfarin. These drugs may increase the risk of bleeding problems if taken during treatment with warfarin. Carefully check all prescription/nonprescription product labels (including drugs applied to the skin such as pain-relieving creams) since the products may contain NSAIDs or salicylates. Talk to your doctor about using a different medication (such as acetaminophen) to treat pain/fever. Low-dose aspirin and related drugs (such as clopidogrel, ticlopidine) should be continued if prescribed by your doctor for specific medical " reasons such as heart attack or stroke prevention. Consult your doctor or pharmacist for more details. Many herbal products interact with warfarin. Tell your doctor before taking any herbal products, especially bromelains, coenzyme Q10, cranberry, danshen, dong quai, fenugreek, garlic, ginkgo biloba, ginseng, and Zahra's wort, among others. This medication may interfere with a certain laboratory test to measure theophylline levels, possibly causing false test results. Make sure laboratory personnel and all your doctors know you use this drug.      OVERDOSE:  If overdose is suspected, contact a poison control center or emergency room immediately. [place] residents can call the [place] National Poison Hotline at 1-117.509.5485. [place] residents can call a provincial poison control center. Symptoms of overdose may include: bloody/black/tarry stools, pink/dark urine, unusual/prolonged bleeding.      NOTES:  Do not share this medication with others. Laboratory and/or medical tests (such as INR, complete blood count) must be performed periodically to monitor your progress or check for side effects. Consult your doctor for more details.      MISSED DOSE:  For the best possible benefit, do not miss any doses. If you do miss a dose and remember on the same day, take it as soon as you remember. If you remember on the next day, skip the missed dose and resume your usual dosing schedule. Do not double the dose to catch up because this could increase your risk for bleeding. Keep a record of missed doses to give to your doctor or pharmacist. Contact your doctor or pharmacist if you miss 2 or more doses in a row.      STORAGE:  Store at room temperature away from light and moisture. Do not store in the bathroom. Keep all medications away from children and pets. Do not flush medications down the toilet or pour them into a drain unless instructed to do so. Properly discard this product when it is  or no longer needed. Consult your  pharmacist or local waste disposal company for more details about how to safely discard your product.      MEDICAL ALERT:  Your condition and medication can cause complications in a medical emergency. For information about enrolling in MedicAlert, call 1-649.928.1925 ([place]) or 1-116.324.2436 ([place]).      Information last revised October 2010 Copyright(c) 2010 [employer]             Depression / Suicide Risk    As you are discharged from this [facility] facility, it is important to learn how to keep safe from harming yourself.    Recognize the warning signs:  · Abrupt changes in personality, positive or negative- including increase in energy   · Giving away possessions  · Change in eating patterns- significant weight changes-  positive or negative  · Change in sleeping patterns- unable to sleep or sleeping all the time   · Unwillingness or inability to communicate  · Depression  · Unusual sadness, discouragement and loneliness  · Talk of wanting to die  · Neglect of personal appearance   · Rebelliousness- reckless behavior  · Withdrawal from people/activities they love  · Confusion- inability to concentrate     If you or a loved one observes any of these behaviors or has concerns about self-harm, here's what you can do:  · Talk about it- your feelings and reasons for harming yourself  · Remove any means that you might use to hurt yourself (examples: pills, rope, extension cords, firearm)  · Get professional help from the community (Mental Health, Substance Abuse, psychological counseling)  · Do not be alone:Call your Safe Contact- someone whom you trust who will be there for you.  · Call your local CRISIS HOTLINE 626-9051 or 189-580-2904  · Call your local Children's Mobile Crisis Response Team [place] (371) 082-4980 or www.[employer]  · Call the toll free National Suicide Prevention Hotlines   · National Suicide Prevention Lifeline 784-291-FXRO (0587)  · National [employer] Line Network  "800-SUICIDE (115-6072)      MY COPD ACTION PLAN     It is recommended that patients and physicians /healthcare providers complete this action plan together. This plan should be discussed at each physician visit and updated as needed.    The green, yellow and red zones show groups of symptoms of COPD. This list of symptoms is not comprehensive, and you may experience other symptoms. In the \"Actions\" column, your healthcare provider has recommended actions for you to take based on your symptoms.    Patient Name: Lorene Haro   YOB: 1975   Last Updated on: 5/10/2021  1:22 PM   Green Zone:  I am doing well today Actions   •  Usual activitiy and exercise level •  Take daily medications   •  Usual amounts of cough and phlegm/mucus •  Use oxygen as prescribed   •  Sleep well at night •  Continue regular exercise/diet plan   •  Appetite is good •  At all times avoid cigarette smoke, inhaled irritants     Daily Medications (these medications are taken every day):   Tiotropium Bromide Monohydrate (Spiriva) 2 Puffs Once daily        Yellow Zone:  I am having a bad day or a COPD flare Actions   •  More breathless than usual •  Continue daily medications   •  I have less energy for my daily activities •  Use quick relief inhaler as ordered   •  Increased or thicker phlegm/mucus •  Use oxygen as prescribed   •  Using quick relief inhaler/nebulizer more often •  Get plenty of rest   •  Swelling of ankles more than usual •  Use pursed lip breathing   •  More coughing than usual •  At all times avoid cigarette smoke, inhaled irritants   •  I feel like I have a \"chest cold\"   •  Poor sleep and my symptoms woke me up   •  My appetite is not good   •  My medicine is not helping    •  Call provider immediately if symptoms don’t improve     Continue daily medications, add rescue medications:   Albuterol/Ipratropium (Combivent, Duoneb)  Albuterol 3mL via nebulizer  2 Puffs Every 4 hours PRN  Every 4 hours PRN "       Medications to be used during a flare up, (as Discussed with Provider):           Additional Information:  Use albuterol inhaler with spacer and rinse nebulizer per manufacturers recommendation    Red Zone:  I need urgent medical care Actions   •  Severe shortness of breath even at rest •  Call 911 or seek medical care immediately   •  Not able to do any activity because of breathing    •  Fever or shaking chills    •  Feeling confused or very drowsy     •  Chest pains    •  Coughing up blood        Sleep Apnea  Sleep apnea affects breathing during sleep. It causes breathing to stop for a short time or to become shallow. It can also increase the risk of:  · Heart attack.  · Stroke.  · Being very overweight (obese).  · Diabetes.  · Heart failure.  · Irregular heartbeat.  The goal of treatment is to help you breathe normally again.  What are the causes?  There are three kinds of sleep apnea:  · Obstructive sleep apnea. This is caused by a blocked or collapsed airway.  · Central sleep apnea. This happens when the brain does not send the right signals to the muscles that control breathing.  · Mixed sleep apnea. This is a combination of obstructive and central sleep apnea.  The most common cause of this condition is a collapsed or blocked airway. This can happen if:  · Your throat muscles are too relaxed.  · Your tongue and tonsils are too large.  · You are overweight.  · Your airway is too small.  What increases the risk?  · Being overweight.  · Smoking.  · Having a small airway.  · Being older.  · Being male.  · Drinking alcohol.  · Taking medicines to calm yourself (sedatives or tranquilizers).  · Having family members with the condition.  What are the signs or symptoms?  · Trouble staying asleep.  · Being sleepy or tired during the day.  · Getting angry a lot.  · Loud snoring.  · Headaches in the morning.  · Not being able to focus your mind (concentrate).  · Forgetting things.  · Less interest in sex.  · Mood  swings.  · Personality changes.  · Feelings of sadness (depression).  · Waking up a lot during the night to pee (urinate).  · Dry mouth.  · Sore throat.  How is this diagnosed?  · Your medical history.  · A physical exam.  · A test that is done when you are sleeping (sleep study). The test is most often done in a sleep lab but may also be done at home.  How is this treated?    · Sleeping on your side.  · Using a medicine to get rid of mucus in your nose (decongestant).  · Avoiding the use of alcohol, medicines to help you relax, or certain pain medicines (narcotics).  · Losing weight, if needed.  · Changing your diet.  · Not smoking.  · Using a machine to open your airway while you sleep, such as:  ? An oral appliance. This is a mouthpiece that shifts your lower jaw forward.  ? A CPAP device. This device blows air through a mask when you breathe out (exhale).  ? An EPAP device. This has valves that you put in each nostril.  ? A BPAP device. This device blows air through a mask when you breathe in (inhale) and breathe out.  · Having surgery if other treatments do not work.  It is important to get treatment for sleep apnea. Without treatment, it can lead to:  · High blood pressure.  · Coronary artery disease.  · In men, not being able to have an erection (impotence).  · Reduced thinking ability.  Follow these instructions at home:  Lifestyle  · Make changes that your doctor recommends.  · Eat a healthy diet.  · Lose weight if needed.  · Avoid alcohol, medicines to help you relax, and some pain medicines.  · Do not use any products that contain nicotine or tobacco, such as cigarettes, e-cigarettes, and chewing tobacco. If you need help quitting, ask your doctor.  General instructions  · Take over-the-counter and prescription medicines only as told by your doctor.  · If you were given a machine to use while you sleep, use it only as told by your doctor.  · If you are having surgery, make sure to tell your doctor you  have sleep apnea. You may need to bring your device with you.  · Keep all follow-up visits as told by your doctor. This is important.  Contact a doctor if:  · The machine that you were given to use during sleep bothers you or does not seem to be working.  · You do not get better.  · You get worse.  Get help right away if:  · Your chest hurts.  · You have trouble breathing in enough air.  · You have an uncomfortable feeling in your back, arms, or stomach.  · You have trouble talking.  · One side of your body feels weak.  · A part of your face is hanging down.  These symptoms may be an emergency. Do not wait to see if the symptoms will go away. Get medical help right away. Call your local emergency services (911 in the [place] Do not drive yourself to the hospital.  Summary  · This condition affects breathing during sleep.  · The most common cause is a collapsed or blocked airway.  · The goal of treatment is to help you breathe normally while you sleep.  This information is not intended to replace advice given to you by your health care provider. Make sure you discuss any questions you have with your health care provider.  Document Released: 09/26/2009 Document Revised: 10/04/2019 Document Reviewed: 08/13/2019  [employer] Patient Education © 2020 [employer]      Skin Yeast Infection    A skin yeast infection is a condition in which there is an overgrowth of yeast (candida) that normally lives on the skin. This condition usually occurs in areas of the skin that are constantly warm and moist, such as the armpits or the groin.  What are the causes?  This condition is caused by a change in the normal balance of the yeast and bacteria that live on the skin.  What increases the risk?  You are more likely to develop this condition if you:  · Are obese.  · Are pregnant.  · Take birth control pills.  · Have diabetes.  · Take antibiotic medicines.  · Take steroid medicines.  · Are malnourished.  · Have a weak body defense  system (immune system).  · Are 65 years of age or older.  · Wear tight clothing.  What are the signs or symptoms?  The most common symptom of this condition is itchiness in the affected area. Other symptoms include:  · Red, swollen area of the skin.  · Bumps on the skin.  How is this diagnosed?  · This condition is diagnosed with a medical history and physical exam.  · Your health care provider may check for yeast by taking light scrapings of the skin to be viewed under a microscope.  How is this treated?  This condition is treated with medicine. Medicines may be prescribed or be available over the counter. The medicines may be:  · Taken by mouth (orally).  · Applied as a cream or powder to your skin.  Follow these instructions at home:    · Take or apply over-the-counter and prescription medicines only as told by your health care provider.  · Maintain a healthy weight. If you need help losing weight, talk with your health care provider.  · Keep your skin clean and dry.  · If you have diabetes, keep your blood sugar under control.  · Keep all follow-up visits as told by your health care provider. This is important.  Contact a health care provider if:  · Your symptoms go away and then return.  · Your symptoms do not get better with treatment.  · Your symptoms get worse.  · Your rash spreads.  · You have a fever or chills.  · You have new symptoms.  · You have new warmth or redness of your skin.  Summary  · A skin yeast infection is a condition in which there is an overgrowth of yeast (candida) that normally lives on the skin. This condition is caused by a change in the normal balance of the yeast and bacteria that live on the skin.  · Take or apply over-the-counter and prescription medicines only as told by your health care provider.  · Keep your skin clean and dry.  · Contact a health care provider if your symptoms do not get better with treatment.  This information is not intended to replace advice given to you by  your health care provider. Make sure you discuss any questions you have with your health care provider.  Document Released: 09/04/2012 Document Revised: 05/07/2019 Document Reviewed: 05/07/2019  Elsevier Patient Education © 2020 Elsevier Inc.

## 2021-05-18 NOTE — PROGRESS NOTES
Received report from NOC RN. Assumed care of patient at 0700. Patient A&Ox 4, speaking in full sentences, follows commands and responds appropriately to questions. On 2.5L NC, no signs of respiratory distress. Respirations are even and unlabored. Patient states no pain at this time. POC discussed and agreed upon with patient. Call light and belongings within reach. Bed in lowest locked position. Upper side rails raised. Bed alarm on. Fall risk precautions in place. Hourly rounding. Will continue to monitor respiratory status.

## 2021-05-18 NOTE — CARE PLAN
The patient is Stable - Low risk of patient condition declining or worsening    Shift Goals  Clinical Goals: Pt will mobilize by the end of shift  Patient Goals: Rest well    Progress made toward(s) clinical / shift goals:  Pt communicated well and called appropriately. Mobilized to the bathroom and was up in the chair for the first half of this shift.     Patient is not progressing towards the following goals: NA      Problem: Communication  Goal: The ability to communicate needs accurately and effectively will improve  Outcome: Progressing     Problem: Mobility  Goal: Patient's capacity to carry out activities will improve  Outcome: Progressing

## 2021-05-19 ENCOUNTER — HOME CARE VISIT (OUTPATIENT)
Dept: HOME HEALTH SERVICES | Facility: HOME HEALTHCARE | Age: 46
End: 2021-05-19

## 2021-05-19 NOTE — DISCHARGE SUMMARY
Discharge Summary    CHIEF COMPLAINT ON ADMISSION  Chief Complaint   Patient presents with   • Weakness       Reason for Admission  COPD exacerbation    Admission Date  5/8/2021    CODE STATUS  FULL     HPI & HOSPITAL COURSE  The patient is a 45-year-old female with a past medical history of COPD on 3L/min, schizophrenia, bipolar disorder, and morbid obesity who presented to the ED with a chief complaint of worsening shortness of breath.    D-dimer was not elevated   NT proBNP 457. Procal neg  Patient was admitted for acute COPD exacerbation with underlying KATHIA/OHS. Still smoking - hence, counseling provided.   RUE swelling, pain. Doppler: no DVT.  PT/OT rec post acute initially. However due to insurance, all SNF declined. Throughout the hospital course, Pt contiuned to improve: therapist rec changed to home health.     5/18: afebrile with vitals wnl. Her O2 at baseline and no acute complain from patient. At this point, a plan to DC with home health discussed. She has been advised to follow up with her PCP within 2 weeks post DC and also to follow up with her established care at [facility].      Therefore, she is discharged in fair and stable condition to home with close outpatient follow-up.    The patient met 2-midnight criteria for an inpatient stay at the time of discharge.    Discharge Date  5/18/2021    FOLLOW UP ITEMS POST DISCHARGE  N/A    DISCHARGE DIAGNOSES  Principal Problem:    Acute on chronic respiratory failure (HCC) POA: Yes  Active Problems:    Bipolar Disorder POA: Yes    KATHIA and COPD overlap syndrome (HCC) POA: Yes    Iron deficiency anemia POA: Yes    Leukocytosis POA: Yes    Debilitated POA: Yes    Morbid obesity (HCC) POA: Yes    Macrocytic anemia POA: Yes    Tobacco use POA: Yes    Chest pain, rule out acute myocardial infarction POA: Yes    Schizophrenia (HCC) POA: Yes    Elevated brain natriuretic peptide (BNP) level POA: Yes    Paroxysmal atrial fibrillation (HCC) POA: Yes     Yeast infection of the skin POA: Yes  Resolved Problems:    * No resolved hospital problems. *    Physical Exam  Vitals and nursing note reviewed.   Constitutional:       General: She is not in acute distress.     Appearance: She is obese.      Comments: Well developed   HENT:      Head: Normocephalic and atraumatic.      Mouth/Throat:      Pharynx: No oropharyngeal exudate.   Eyes:      General: No scleral icterus.     Conjunctiva/sclera: Conjunctivae normal.   Cardiovascular:      Rate and Rhythm: Normal rate and regular rhythm.      Heart sounds: Normal heart sounds. No murmur heard.     Pulmonary:      Effort: Pulmonary effort is normal. No respiratory distress.      Breath sounds: Normal breath sounds. No wheezing.   Abdominal:      General: Bowel sounds are normal. There is no distension.      Palpations: Abdomen is soft.      Tenderness: There is no abdominal tenderness. There is no rebound.   Musculoskeletal:         General: No tenderness. Normal range of motion.      Cervical back: Normal range of motion and neck supple.   Skin:     General: Skin is warm and dry.   Neurological:      Mental Status: She is alert and oriented to person, place, and time.      Cranial Nerves: No cranial nerve deficit.      Coordination: Coordination normal.   Psychiatric:         Mood and Affect: Affect normal.         FOLLOW UP  Future Appointments   Date Time Provider Department Center   5/19/2021  1:15 PM [facility] EXAM 4 [facility] None   5/25/2021 10:15 AM Angelita Posada P.A.-C. [facility] None   6/9/2021 12:40 PM CRISTOBAL Barth [facility] None     SHANKAR Gleason  [address] [facility] [id number] [phone]    Schedule an appointment as soon as possible for a visit in 1 week      [address] [address]  [phone]  Call today        MEDICATIONS ON DISCHARGE     Medication List      CHANGE how you take these medications      Instructions   nystatin powder  What changed:   · how much  to take  · how to take this  · when to take this  · reasons to take this  · additional instructions  Commonly known as: MYCOSTATIN   Apply underneath breasts and on inner thigh area twice a day for fungal infection     warfarin 6 MG Tabs  What changed:   · how much to take  · how to take this  · when to take this  · additional instructions  Commonly known as: COUMADIN   Take 1 tablet by mouth daily or as directed by anticoagulation clinic        CONTINUE taking these medications      Instructions   Abilify 30 MG tablet  Generic drug: aripiprazole   Take 30 mg by mouth every morning.  Dose: 30 mg     acetaminophen 500 MG Tabs  Commonly known as: TYLENOL   Take 1,000 mg by mouth every 6 hours as needed (pain).  Dose: 1,000 mg     atorvastatin 20 MG Tabs  Commonly known as: LIPITOR   Take 1 Tab by mouth every day.  Dose: 20 mg     fluticasone 50 MCG/ACT nasal spray  Commonly known as: FLONASE   Administer 1 Spray into affected nostril(S) 2 times a day.  Dose: 1 Spray     furosemide 20 MG Tabs  Commonly known as: LASIX   Take 1 tablet by mouth 2 times a day.  Dose: 20 mg     lithium carbonate  MG Tbcr tablet   Take 300-600 mg by mouth 2 Times a Day. 1 caps = 300 mg every morning  2 caps = 600 mg every evening  Dose: 300-600 mg     metoprolol SR 25 MG Tb24  Commonly known as: TOPROL XL   Take 1 tablet by mouth every day.  Dose: 25 mg     montelukast 10 MG Tabs  Commonly known as: SINGULAIR   Take 1 Tab by mouth every bedtime.  Dose: 10 mg     potassium chloride SA 20 MEQ Tbcr  Commonly known as: Kdur   Take 1 tablet by mouth every day.  Dose: 20 mEq     Spiriva HandiHaler 18 MCG Caps  Generic drug: tiotropium   Inhale 1 Cap by mouth every day.  Dose: 18 mcg     Ventolin  (90 Base) MCG/ACT Aers inhalation aerosol  Generic drug: albuterol   Inhale 2 Puffs every 6 hours as needed for Shortness of Breath.  Dose: 2 Puff        STOP taking these medications    cephALEXin 500 MG Caps  Commonly known as: KEFLEX             Allergies  Allergies   Allergen Reactions   • Amoxicillin Rash and Itching     Tolerates cephalosporins, pt reports that she gets a rash all over her body and gets itchy   • Penicillin G Rash and Itching     pt reports that she gets a rash all over her body and gets itchy       ACTIVITY  As tolerated.  Weight bearing as tolerated    CONSULTATIONS  None    PROCEDURES  N/A    LABORATORY  Lab Results   Component Value Date    SODIUM 139 05/18/2021    POTASSIUM 4.0 05/18/2021    CHLORIDE 103 05/18/2021    CO2 30 05/18/2021    GLUCOSE 75 05/18/2021    BUN 18 05/18/2021    CREATININE 0.80 05/18/2021    CREATININE 0.9 09/11/2008        Lab Results   Component Value Date    WBC 9.9 05/18/2021    HEMOGLOBIN 11.2 (L) 05/18/2021    HEMATOCRIT 37.7 05/18/2021    PLATELETCT 238 05/18/2021        Total time of the discharge process exceeds 36 minutes.

## 2021-05-20 ENCOUNTER — DOCUMENTATION (OUTPATIENT)
Dept: MEDICAL GROUP | Facility: PHYSICIAN GROUP | Age: 46
End: 2021-05-20

## 2021-05-20 ENCOUNTER — HOME CARE VISIT (OUTPATIENT)
Dept: HOME HEALTH SERVICES | Facility: HOME HEALTHCARE | Age: 46
End: 2021-05-20
Payer: MEDICAID

## 2021-05-20 ENCOUNTER — TELEPHONE (OUTPATIENT)
Dept: VASCULAR LAB | Facility: MEDICAL CENTER | Age: 46
End: 2021-05-20

## 2021-05-20 ENCOUNTER — ANTICOAGULATION MONITORING (OUTPATIENT)
Dept: MEDICAL GROUP | Facility: PHYSICIAN GROUP | Age: 46
End: 2021-05-20

## 2021-05-20 ENCOUNTER — ANTICOAGULATION MONITORING (OUTPATIENT)
Dept: VASCULAR LAB | Facility: MEDICAL CENTER | Age: 46
End: 2021-05-20

## 2021-05-20 VITALS
OXYGEN SATURATION: 97 % | SYSTOLIC BLOOD PRESSURE: 114 MMHG | TEMPERATURE: 98 F | DIASTOLIC BLOOD PRESSURE: 70 MMHG | RESPIRATION RATE: 18 BRPM | HEART RATE: 62 BPM

## 2021-05-20 DIAGNOSIS — I48.0 PAROXYSMAL ATRIAL FIBRILLATION (HCC): ICD-10-CM

## 2021-05-20 DIAGNOSIS — Z79.01 CHRONIC ANTICOAGULATION: Primary | ICD-10-CM

## 2021-05-20 LAB — INR PPP: 1.9 - CRITICAL LOW: < 0.8, CRITICAL HIGH: > 6.5 (ref 2–3.5)

## 2021-05-20 PROCEDURE — G0493 RN CARE EA 15 MIN HH/HOSPICE: HCPCS

## 2021-05-20 PROCEDURE — 665001 SOC-HOME HEALTH

## 2021-05-20 SDOH — ECONOMIC STABILITY: HOUSING INSECURITY
HOME SAFETY: ONE AS SOON AS POSSIBLE. SMOKE ALARMS ARE PRESENT AND FUNCTIONAL ON EACH LEVEL OF THE HOME. PATIENT DOES HAVE A FIRE ESCAPE PLAN DEVELOPED. PATIENT DOES NOT HAVE FLAMMABLE MATERIALS PRESENT IN THE HOME PRESENTING A FIRE HAZARD. NO EVIDENCE FOUND OF

## 2021-05-20 SDOH — ECONOMIC STABILITY: HOUSING INSECURITY
HOME SAFETY: OXYGEN SAFETY RISK ASSESSMENT PERFORMED. PATIENT PT WAS GIVEN A NO SMOKING SIGN AND PROVIDED EDUCATION ABOUT WHY IT IS IMPORTANT TO PLACE ONE. PATIENT DOES NOT HAVE A WORKING FIRE EXTINGUISHER PRESENT IN THE HOME., INSTRUCTED PATIENT/CAREGIVER TO GET

## 2021-05-20 SDOH — ECONOMIC STABILITY: HOUSING INSECURITY: EVIDENCE OF SMOKING MATERIAL: 0

## 2021-05-20 SDOH — ECONOMIC STABILITY: HOUSING INSECURITY: HOME SAFETY: SMOKING MATERIALS PRESENT IN THE HOME.

## 2021-05-20 ASSESSMENT — PATIENT HEALTH QUESTIONNAIRE - PHQ9
2. FEELING DOWN, DEPRESSED, IRRITABLE, OR HOPELESS: 00
CLINICAL INTERPRETATION OF PHQ2 SCORE: 0
1. LITTLE INTEREST OR PLEASURE IN DOING THINGS: 00

## 2021-05-20 ASSESSMENT — ENCOUNTER SYMPTOMS
VOMITING: DENIES
NAUSEA: DENIES

## 2021-05-20 NOTE — PROGRESS NOTES
[facility]    Pt recently admitted 5/8/21 - 5/15/21 and discharged on home health care.   Warfarin dose while inpatient has varied and recently higher than scheduled home dose.     Orders sent to [facility] and updated anticoagulation episode.    Carlos Barros, HildaD, [facility]

## 2021-05-20 NOTE — PROGRESS NOTES
Medication chart review for [facility] services    PCP:  SHANKAR Gleason  [address] [facility] [address]  Fax: [phone]    Current medication list     Current Outpatient Medications:   •  metoprolol SR, 25 mg, Oral, DAILY  •  acetaminophen, 1,000 mg, Oral, Q6HRS PRN  •  furosemide, 20 mg, Oral, BID  •  potassium chloride SA, 20 mEq, Oral, DAILY  •  warfarin, Take 1 tablet by mouth daily or as directed by anticoagulation clinic  •  fluticasone, 1 Spray, Nasal, BID  •  albuterol, 2 Puff, Inhalation, Q6HRS PRN  •  nystatin, Apply underneath breasts and on inner thigh area twice a day for fungal infection  •  montelukast, 10 mg, Oral, QHS  •  atorvastatin, 20 mg, Oral, DAILY  •  Spiriva HandiHaler, 18 mcg, Inhalation, DAILY  •  lithium carbonate ER, 300-600 mg, Oral, BID  •  aripiprazole, 30 mg, Oral, QAM    Allergies   Allergen Reactions   • Amoxicillin Rash and Itching     Tolerates cephalosporins, pt reports that she gets a rash all over her body and gets itchy   • Penicillin G Rash and Itching     pt reports that she gets a rash all over her body and gets itchy         Labs / Images Reviewed:     Lab Results   Component Value Date/Time    SODIUM 139 05/18/2021 04:16 AM    POTASSIUM 4.0 05/18/2021 04:16 AM    CHLORIDE 103 05/18/2021 04:16 AM    CO2 30 05/18/2021 04:16 AM    GLUCOSE 75 05/18/2021 04:16 AM    BUN 18 05/18/2021 04:16 AM    CREATININE 0.80 05/18/2021 04:16 AM    CREATININE 0.9 09/11/2008 01:45 PM      Lab Results   Component Value Date/Time    ALKPHOSPHAT 105 (H) 05/08/2021 09:53 PM    ASTSGOT 13 05/08/2021 09:53 PM    ALTSGPT 20 05/08/2021 09:53 PM    TBILIRUBIN 0.5 05/08/2021 09:53 PM    ALBUMIN 3.7 05/08/2021 09:53 PM    INR 1.90 (A) 05/20/2021 10:44 AM            Assessment and Plan:   Medium  Drug-Drug: furosemide and lithium carbonate ER  Plasma concentrations and toxicity of Lithium may be increased by Loop Diuretics.  Continue to monitor lithium levels at regular  intervals.          Shaw Read, PharmD, MS, BCACP, [facility]  Phone [phone] fax [phone]    This note was created using voice recognition software (Dragon). The accuracy of the dictation is limited by the abilities of the software. I have reviewed the note prior to signing, however some errors in grammar and context are still possible. If you have any questions related to this note please do not hesitate to contact our office.

## 2021-05-21 ENCOUNTER — APPOINTMENT (OUTPATIENT)
Dept: VASCULAR LAB | Facility: MEDICAL CENTER | Age: 46
End: 2021-05-21
Attending: INTERNAL MEDICINE
Payer: MEDICAID

## 2021-05-21 ENCOUNTER — DOCUMENTATION (OUTPATIENT)
Dept: MEDICAL GROUP | Facility: PHYSICIAN GROUP | Age: 46
End: 2021-05-21

## 2021-05-21 NOTE — PROGRESS NOTES
I attempted to call this patient to report their INR. Unfortunately, I was not able to speak with them or leave a voice message; therefore, we will need to call back at a later time.

## 2021-05-24 ENCOUNTER — HOME CARE VISIT (OUTPATIENT)
Dept: HOME HEALTH SERVICES | Facility: HOME HEALTHCARE | Age: 46
End: 2021-05-24
Payer: MEDICAID

## 2021-05-24 ENCOUNTER — TELEPHONE (OUTPATIENT)
Dept: VASCULAR LAB | Facility: MEDICAL CENTER | Age: 46
End: 2021-05-24

## 2021-05-24 ENCOUNTER — ANTICOAGULATION MONITORING (OUTPATIENT)
Dept: MEDICAL GROUP | Facility: PHYSICIAN GROUP | Age: 46
End: 2021-05-24

## 2021-05-24 VITALS
OXYGEN SATURATION: 94 % | RESPIRATION RATE: 20 BRPM | SYSTOLIC BLOOD PRESSURE: 116 MMHG | DIASTOLIC BLOOD PRESSURE: 70 MMHG | TEMPERATURE: 98 F | HEART RATE: 88 BPM

## 2021-05-24 VITALS
OXYGEN SATURATION: 92 % | TEMPERATURE: 98.1 F | RESPIRATION RATE: 20 BRPM | DIASTOLIC BLOOD PRESSURE: 60 MMHG | HEART RATE: 78 BPM | SYSTOLIC BLOOD PRESSURE: 90 MMHG

## 2021-05-24 DIAGNOSIS — I48.0 PAROXYSMAL ATRIAL FIBRILLATION (HCC): ICD-10-CM

## 2021-05-24 LAB
INR PPP: 1.4 (ref 2–3.5)
INR PPP: 1.4 - CRITICAL LOW: < 0.8, CRITICAL HIGH: > 6.5 (ref 2–3.5)

## 2021-05-24 PROCEDURE — G0493 RN CARE EA 15 MIN HH/HOSPICE: HCPCS

## 2021-05-24 PROCEDURE — G0151 HHCP-SERV OF PT,EA 15 MIN: HCPCS

## 2021-05-24 ASSESSMENT — ENCOUNTER SYMPTOMS
VOMITING: DENIES
NAUSEA: DENIES
POOR JUDGMENT: 1

## 2021-05-24 ASSESSMENT — ACTIVITIES OF DAILY LIVING (ADL)
AMBULATION_DISTANCE/DURATION_TOLERATED: >300 FEET
AMBULATION ASSISTANCE ON FLAT SURFACES: 1

## 2021-05-24 NOTE — TELEPHONE ENCOUNTER
[facility]    Received call from Monique at [facility] who was with pt at time of call.    Confirmed pt's phone number with RN - states the pt sometimes does not hear the phone ring but it is correct number.    INR order was placed this morning and asked RN to draw during visit. Will follow up with pt with INR results once posted.      Carlos Barros, HildaD, [facility]

## 2021-05-24 NOTE — PROGRESS NOTES
Attempted to reach pt regarding INR from [facility]. Unable to reach pt, left VM to please call us back as to updated us to dose and changes with recent hospitalization. Placed order for INR today via . Will let [facility] know we have been unable to reach pt.     Keely Escoto, PharmD

## 2021-05-25 ENCOUNTER — HOME CARE VISIT (OUTPATIENT)
Dept: HOME HEALTH SERVICES | Facility: HOME HEALTHCARE | Age: 46
End: 2021-05-25
Payer: MEDICAID

## 2021-05-25 PROCEDURE — G0155 HHCP-SVS OF CSW,EA 15 MIN: HCPCS

## 2021-05-25 ASSESSMENT — ACTIVITIES OF DAILY LIVING (ADL): OASIS_M1830: 03

## 2021-05-25 NOTE — CASE COMMUNICATION
I agree with these changes.  ----- Message -----  From: Desiree Wyatt R.N.  Sent: 5/25/2021  12:58 PM PDT  To: Elizabeth Alfred R.N.      Quality Review for 5/19/21 SOC OASIS by MARCEL Wyatt RN on  May 25, 2021      Edits completed by MARCEL Wyatt RN:  1.  is NA, pt was observation status in hospital  2.  is 3 per PT note dated 5/24, making  5/24 per the collaboration convention  3.  checked #6 per chart review  4. Per narrative that patient needs min assistance and a walker for safety, the following changes were made:, , ,  are 2;  and  aer 3  5.  is 3 per ambulation status  6.  is 2  7. Functional limitations checked incontinence  8. Safety measures checked ambulate only with assistance  9. Completed F2F information  10. Added anticoagulation to the care plan

## 2021-05-25 NOTE — CASE COMMUNICATION
Quality Review for 5/19/21 SOC OASIS by MARCEL Wyatt RN on  May 25, 2021      Edits completed by MARCEL Wyatt RN:  1.  is NA, pt was observation status in hospital  2.  is 3 per PT note dated 5/24, making  5/24 per the collaboration convention  3.  checked #6 per chart review  4. Per narrative that patient needs min assistance and a walker for safety, the following changes were made:, , ,  are 2;  and  aer 3  5.  is 3 per ambulation status  6.  is 2  7. Functional limitations checked incontinence  8. Safety measures checked ambulate only with assistance  9. Completed F2F information  10. Added anticoagulation to the care plan

## 2021-05-25 NOTE — PROGRESS NOTES
OP Telephone Anticoagulation Service Note    Date: 2021      Anticoagulation Summary  As of 2021    INR goal:  2.0-3.0   TTR:  15.7 % (1.3 mo)   INR used for dosin.40 (2021)   Warfarin maintenance plan:  6 mg (6 mg x 1) every day   Weekly warfarin total:  42 mg   Plan last modified:  Liudmila Mayes PharmD (2021)   Next INR check:  2021   Target end date:  Indefinite    Indications    Paroxysmal atrial fibrillation (HCC) [I48.0]             Anticoagulation Episode Summary     INR check location:      Preferred lab:      Send INR reminders to:      Comments:  [facility]         Anticoagulation Patient Findings      INR SUB-therapeutic at 1.4.  Spoke w/ pt on phone.  Verified regimen w/ pt - pt held x 3 days as she was unsure of what dose to take after her INR resulted on .  Instructed pt to bolus x 1 w/ 12 mg and to then continue on w/ her regimen.  NO s/s bleeding reported per pt.  NO changes in diet reported per pt.  NO changes in medications reported per pt.  Check INR in 3 day(s).  Instructed pt to call clinic at [phone] if there are any questions.  Pt stated understanding.    Pt is not on antiplatelet therapy.    Orders faxed to JOANN Kang PharmD

## 2021-05-27 ENCOUNTER — ANTICOAGULATION MONITORING (OUTPATIENT)
Dept: MEDICAL GROUP | Facility: MEDICAL CENTER | Age: 46
End: 2021-05-27

## 2021-05-27 ENCOUNTER — HOME CARE VISIT (OUTPATIENT)
Dept: HOME HEALTH SERVICES | Facility: HOME HEALTHCARE | Age: 46
End: 2021-05-27
Payer: MEDICAID

## 2021-05-27 VITALS
DIASTOLIC BLOOD PRESSURE: 60 MMHG | HEART RATE: 79 BPM | OXYGEN SATURATION: 98 % | TEMPERATURE: 98.1 F | RESPIRATION RATE: 18 BRPM | SYSTOLIC BLOOD PRESSURE: 105 MMHG

## 2021-05-27 DIAGNOSIS — I48.0 PAROXYSMAL ATRIAL FIBRILLATION (HCC): ICD-10-CM

## 2021-05-27 LAB
INR PPP: 2 (ref 2–3.5)
INR PPP: 2 (ref 2–3.5)
INR PPP: 2 - CRITICAL LOW: < 0.8, CRITICAL HIGH: > 6.5 (ref 2–3.5)

## 2021-05-27 PROCEDURE — G0299 HHS/HOSPICE OF RN EA 15 MIN: HCPCS

## 2021-05-27 NOTE — PROGRESS NOTES
Attempted to call patient, but n/a and VM full.   Called next number listed (mom) but she was not able to take message but would tell her daughter to call the clinic back.   Will try patient again later.       5/27/21 4:30pm  Brian rBiones PharmD

## 2021-05-28 NOTE — PROGRESS NOTES
vm remains full, unable to provide anticoagulation dosing  Ceci Coe, Clinical Pharmacist, CDE, CACP

## 2021-05-29 ENCOUNTER — HOSPITAL ENCOUNTER (EMERGENCY)
Facility: MEDICAL CENTER | Age: 46
End: 2021-05-29
Attending: EMERGENCY MEDICINE
Payer: MEDICAID

## 2021-05-29 ENCOUNTER — APPOINTMENT (OUTPATIENT)
Dept: RADIOLOGY | Facility: MEDICAL CENTER | Age: 46
End: 2021-05-29
Attending: EMERGENCY MEDICINE
Payer: MEDICAID

## 2021-05-29 VITALS
OXYGEN SATURATION: 97 % | SYSTOLIC BLOOD PRESSURE: 129 MMHG | WEIGHT: 285 LBS | RESPIRATION RATE: 20 BRPM | TEMPERATURE: 98.6 F | DIASTOLIC BLOOD PRESSURE: 64 MMHG | HEART RATE: 83 BPM | HEIGHT: 61 IN | BODY MASS INDEX: 53.81 KG/M2

## 2021-05-29 DIAGNOSIS — J44.1 ACUTE EXACERBATION OF CHRONIC OBSTRUCTIVE PULMONARY DISEASE (COPD) (HCC): ICD-10-CM

## 2021-05-29 DIAGNOSIS — R06.02 SHORTNESS OF BREATH: ICD-10-CM

## 2021-05-29 LAB
ALBUMIN SERPL BCP-MCNC: 3.3 G/DL (ref 3.2–4.9)
ALBUMIN/GLOB SERPL: 1.1 G/DL
ALP SERPL-CCNC: 107 U/L (ref 30–99)
ALT SERPL-CCNC: 22 U/L (ref 2–50)
ANION GAP SERPL CALC-SCNC: 9 MMOL/L (ref 7–16)
ANISOCYTOSIS BLD QL SMEAR: ABNORMAL
AST SERPL-CCNC: 20 U/L (ref 12–45)
BASOPHILS # BLD AUTO: 0.2 % (ref 0–1.8)
BASOPHILS # BLD: 0.02 K/UL (ref 0–0.12)
BILIRUB SERPL-MCNC: 0.8 MG/DL (ref 0.1–1.5)
BUN SERPL-MCNC: 8 MG/DL (ref 8–22)
BURR CELLS BLD QL SMEAR: NORMAL
CALCIUM SERPL-MCNC: 8.9 MG/DL (ref 8.5–10.5)
CHLORIDE SERPL-SCNC: 108 MMOL/L (ref 96–112)
CO2 SERPL-SCNC: 24 MMOL/L (ref 20–33)
COMMENT 1642: NORMAL
CREAT SERPL-MCNC: 0.78 MG/DL (ref 0.5–1.4)
DACRYOCYTES BLD QL SMEAR: NORMAL
EKG IMPRESSION: NORMAL
EOSINOPHIL # BLD AUTO: 0.16 K/UL (ref 0–0.51)
EOSINOPHIL NFR BLD: 1.8 % (ref 0–6.9)
ERYTHROCYTE [DISTWIDTH] IN BLOOD BY AUTOMATED COUNT: 60.4 FL (ref 35.9–50)
GLOBULIN SER CALC-MCNC: 3.1 G/DL (ref 1.9–3.5)
GLUCOSE SERPL-MCNC: 166 MG/DL (ref 65–99)
HCT VFR BLD AUTO: 38.6 % (ref 37–47)
HGB BLD-MCNC: 11.5 G/DL (ref 12–16)
IMM GRANULOCYTES # BLD AUTO: 0.05 K/UL (ref 0–0.11)
IMM GRANULOCYTES NFR BLD AUTO: 0.6 % (ref 0–0.9)
LYMPHOCYTES # BLD AUTO: 1.37 K/UL (ref 1–4.8)
LYMPHOCYTES NFR BLD: 15.6 % (ref 22–41)
MACROCYTES BLD QL SMEAR: ABNORMAL
MCH RBC QN AUTO: 27.9 PG (ref 27–33)
MCHC RBC AUTO-ENTMCNC: 29.8 G/DL (ref 33.6–35)
MCV RBC AUTO: 93.7 FL (ref 81.4–97.8)
MICROCYTES BLD QL SMEAR: ABNORMAL
MONOCYTES # BLD AUTO: 0.37 K/UL (ref 0–0.85)
MONOCYTES NFR BLD AUTO: 4.2 % (ref 0–13.4)
MORPHOLOGY BLD-IMP: NORMAL
NEUTROPHILS # BLD AUTO: 6.81 K/UL (ref 2–7.15)
NEUTROPHILS NFR BLD: 77.6 % (ref 44–72)
NRBC # BLD AUTO: 0 K/UL
NRBC BLD-RTO: 0 /100 WBC
OVALOCYTES BLD QL SMEAR: NORMAL
PLATELET # BLD AUTO: 205 K/UL (ref 164–446)
PLATELET BLD QL SMEAR: NORMAL
PMV BLD AUTO: 11.6 FL (ref 9–12.9)
POIKILOCYTOSIS BLD QL SMEAR: NORMAL
POTASSIUM SERPL-SCNC: 4.8 MMOL/L (ref 3.6–5.5)
PROT SERPL-MCNC: 6.4 G/DL (ref 6–8.2)
RBC # BLD AUTO: 4.12 M/UL (ref 4.2–5.4)
RBC BLD AUTO: PRESENT
SODIUM SERPL-SCNC: 141 MMOL/L (ref 135–145)
TROPONIN T SERPL-MCNC: 21 NG/L (ref 6–19)
WBC # BLD AUTO: 8.8 K/UL (ref 4.8–10.8)

## 2021-05-29 PROCEDURE — 700105 HCHG RX REV CODE 258: Performed by: EMERGENCY MEDICINE

## 2021-05-29 PROCEDURE — 93005 ELECTROCARDIOGRAM TRACING: CPT | Performed by: EMERGENCY MEDICINE

## 2021-05-29 PROCEDURE — 84484 ASSAY OF TROPONIN QUANT: CPT

## 2021-05-29 PROCEDURE — 36415 COLL VENOUS BLD VENIPUNCTURE: CPT

## 2021-05-29 PROCEDURE — 700111 HCHG RX REV CODE 636 W/ 250 OVERRIDE (IP): Performed by: EMERGENCY MEDICINE

## 2021-05-29 PROCEDURE — 80053 COMPREHEN METABOLIC PANEL: CPT

## 2021-05-29 PROCEDURE — 85025 COMPLETE CBC W/AUTO DIFF WBC: CPT

## 2021-05-29 PROCEDURE — 94640 AIRWAY INHALATION TREATMENT: CPT

## 2021-05-29 PROCEDURE — 99284 EMERGENCY DEPT VISIT MOD MDM: CPT

## 2021-05-29 PROCEDURE — 700101 HCHG RX REV CODE 250

## 2021-05-29 PROCEDURE — 96374 THER/PROPH/DIAG INJ IV PUSH: CPT

## 2021-05-29 PROCEDURE — 71045 X-RAY EXAM CHEST 1 VIEW: CPT

## 2021-05-29 RX ORDER — TIOTROPIUM BROMIDE 18 UG/1
18 CAPSULE ORAL; RESPIRATORY (INHALATION) DAILY
Qty: 30 CAPSULE | Refills: 3 | Status: SHIPPED | OUTPATIENT
Start: 2021-05-29 | End: 2021-06-26

## 2021-05-29 RX ORDER — IPRATROPIUM BROMIDE AND ALBUTEROL SULFATE 2.5; .5 MG/3ML; MG/3ML
SOLUTION RESPIRATORY (INHALATION)
Status: COMPLETED
Start: 2021-05-29 | End: 2021-05-29

## 2021-05-29 RX ORDER — PREDNISONE 20 MG/1
40 TABLET ORAL DAILY
Qty: 10 TABLET | Refills: 0 | Status: SHIPPED | OUTPATIENT
Start: 2021-05-29 | End: 2021-05-29

## 2021-05-29 RX ORDER — IPRATROPIUM BROMIDE AND ALBUTEROL SULFATE 2.5; .5 MG/3ML; MG/3ML
3 SOLUTION RESPIRATORY (INHALATION)
Status: DISCONTINUED | OUTPATIENT
Start: 2021-05-29 | End: 2021-05-29 | Stop reason: HOSPADM

## 2021-05-29 RX ORDER — METHYLPREDNISOLONE SODIUM SUCCINATE 125 MG/2ML
125 INJECTION, POWDER, LYOPHILIZED, FOR SOLUTION INTRAMUSCULAR; INTRAVENOUS ONCE
Status: COMPLETED | OUTPATIENT
Start: 2021-05-29 | End: 2021-05-29

## 2021-05-29 RX ORDER — SODIUM CHLORIDE, SODIUM LACTATE, POTASSIUM CHLORIDE, CALCIUM CHLORIDE 600; 310; 30; 20 MG/100ML; MG/100ML; MG/100ML; MG/100ML
1000 INJECTION, SOLUTION INTRAVENOUS ONCE
Status: COMPLETED | OUTPATIENT
Start: 2021-05-29 | End: 2021-05-29

## 2021-05-29 RX ORDER — ALBUTEROL SULFATE 90 UG/1
2 AEROSOL, METERED RESPIRATORY (INHALATION) EVERY 6 HOURS PRN
Qty: 8.5 G | Refills: 0 | Status: SHIPPED | OUTPATIENT
Start: 2021-05-29 | End: 2021-05-29

## 2021-05-29 RX ORDER — PREDNISONE 20 MG/1
40 TABLET ORAL DAILY
Qty: 10 TABLET | Refills: 0 | Status: SHIPPED | OUTPATIENT
Start: 2021-05-29 | End: 2021-06-03

## 2021-05-29 RX ORDER — TIOTROPIUM BROMIDE 18 UG/1
18 CAPSULE ORAL; RESPIRATORY (INHALATION) DAILY
Qty: 30 CAPSULE | Refills: 3 | Status: SHIPPED | OUTPATIENT
Start: 2021-05-29 | End: 2021-05-29

## 2021-05-29 RX ORDER — ALBUTEROL SULFATE 90 UG/1
2 AEROSOL, METERED RESPIRATORY (INHALATION) EVERY 6 HOURS PRN
Qty: 8.5 G | Refills: 0 | Status: SHIPPED | OUTPATIENT
Start: 2021-05-29 | End: 2022-08-26

## 2021-05-29 RX ADMIN — IPRATROPIUM BROMIDE AND ALBUTEROL SULFATE 3 ML: 2.5; .5 SOLUTION RESPIRATORY (INHALATION) at 13:34

## 2021-05-29 RX ADMIN — SODIUM CHLORIDE, POTASSIUM CHLORIDE, SODIUM LACTATE AND CALCIUM CHLORIDE 1000 ML: 600; 310; 30; 20 INJECTION, SOLUTION INTRAVENOUS at 13:41

## 2021-05-29 RX ADMIN — METHYLPREDNISOLONE SODIUM SUCCINATE 125 MG: 125 INJECTION, POWDER, FOR SOLUTION INTRAMUSCULAR; INTRAVENOUS at 13:41

## 2021-05-29 RX ADMIN — IPRATROPIUM BROMIDE AND ALBUTEROL SULFATE 3 ML: .5; 2.5 SOLUTION RESPIRATORY (INHALATION) at 13:34

## 2021-05-29 ASSESSMENT — FIBROSIS 4 INDEX: FIB4 SCORE: 0.57

## 2021-05-29 NOTE — ED TRIAGE NOTES
"Chief Complaint   Patient presents with   • Cough     dry cough this am   • Shortness of Breath     woke up this am\"couldn't breath\" used 1 puff of albuterol w/o relief.   • Dizziness     this am. light headed feeling     Arrived able to speak in full sentences.   "

## 2021-05-29 NOTE — ED NOTES
Discharge instructions given to pt including follow up with pcp or returning if no improvement of symptoms or to return if worse. Prescription x 3 provided to pt. Electronically sent to pt's pharmacy. Questions answered by RN. Denies any new complaints. Discharged w/stable vitals and wheeled to the lobby by this RN pt states ran out of 02 but has more 02 tanks at home. Pt sating 95% on ra. NAd. Skin pwd.

## 2021-05-29 NOTE — ED NOTES
Also noted bilateral lower extremity edema but states its chronic for her. Also noted redness w/c is chronic for her.

## 2021-05-30 ENCOUNTER — HOME CARE VISIT (OUTPATIENT)
Dept: HOME HEALTH SERVICES | Facility: HOME HEALTHCARE | Age: 46
End: 2021-05-30
Payer: MEDICAID

## 2021-05-31 ENCOUNTER — HOME CARE VISIT (OUTPATIENT)
Dept: HOME HEALTH SERVICES | Facility: HOME HEALTHCARE | Age: 46
End: 2021-05-31
Payer: MEDICAID

## 2021-05-31 VITALS
SYSTOLIC BLOOD PRESSURE: 112 MMHG | DIASTOLIC BLOOD PRESSURE: 74 MMHG | HEART RATE: 69 BPM | RESPIRATION RATE: 16 BRPM | TEMPERATURE: 98.3 F | OXYGEN SATURATION: 97 %

## 2021-05-31 PROCEDURE — G0152 HHCP-SERV OF OT,EA 15 MIN: HCPCS

## 2021-05-31 PROCEDURE — G0495 RN CARE TRAIN/EDU IN HH: HCPCS

## 2021-05-31 ASSESSMENT — ENCOUNTER SYMPTOMS
VOMITING: DENIES
NAUSEA: DENIES
NAUSEA: DENIES
POOR JUDGMENT: 1
MUSCLE WEAKNESS: 1
AGITATION: 1
VOMITING: DENIES
DIFFICULTY THINKING: 1
SEVERE DYSPNEA: 1

## 2021-06-01 ENCOUNTER — ANTICOAGULATION MONITORING (OUTPATIENT)
Dept: VASCULAR LAB | Facility: MEDICAL CENTER | Age: 46
End: 2021-06-01

## 2021-06-01 DIAGNOSIS — I48.0 PAROXYSMAL ATRIAL FIBRILLATION (HCC): ICD-10-CM

## 2021-06-01 NOTE — PROGRESS NOTES
Anticoagulation Summary  As of 2021    INR goal:  2.0-3.0   TTR:  14.4 % (1.4 mo)   INR used for dosin.00 (2021)   Warfarin maintenance plan:  6 mg (6 mg x 1) every day   Weekly warfarin total:  42 mg   Plan last modified:  Liudmila Mayes, PharmD (2021)   Next INR check:  2021   Target end date:  Indefinite    Indications    Paroxysmal atrial fibrillation (HCC) [I48.0]             Anticoagulation Episode Summary     INR check location:      Preferred lab:      Send INR reminders to:      Comments:  [facility]        Anticoagulation Patient Findings          Left voicemail message to report a  therapeutic INR of 2.0.  Pt to continue with current warfarin dosing regimen. Requested pt contact the clinic for any s/s of unusual bleeding, bruising, clotting or any changes to diet or medication.  Follow up in 2 DAYS, to reduce the risk of adverse events related to this high risk medication, warfarin.    Ceci Coe, Clinical Pharmacist      [facility] to retest INR 6/3/2021

## 2021-06-03 ENCOUNTER — ANTICOAGULATION MONITORING (OUTPATIENT)
Dept: VASCULAR LAB | Facility: MEDICAL CENTER | Age: 46
End: 2021-06-03

## 2021-06-03 ENCOUNTER — HOME CARE VISIT (OUTPATIENT)
Dept: HOME HEALTH SERVICES | Facility: HOME HEALTHCARE | Age: 46
End: 2021-06-03
Payer: MEDICAID

## 2021-06-03 VITALS
HEART RATE: 60 BPM | DIASTOLIC BLOOD PRESSURE: 60 MMHG | SYSTOLIC BLOOD PRESSURE: 98 MMHG | RESPIRATION RATE: 16 BRPM | OXYGEN SATURATION: 96 % | TEMPERATURE: 97.4 F

## 2021-06-03 DIAGNOSIS — I48.0 PAROXYSMAL ATRIAL FIBRILLATION (HCC): ICD-10-CM

## 2021-06-03 LAB
INR PPP: 1.5 (ref 2–3.5)
INR PPP: 1.5 - CRITICAL LOW: < 0.8, CRITICAL HIGH: > 6.5 (ref 2–3.5)

## 2021-06-03 PROCEDURE — G0299 HHS/HOSPICE OF RN EA 15 MIN: HCPCS

## 2021-06-03 SDOH — ECONOMIC STABILITY: HOUSING INSECURITY: EVIDENCE OF SMOKING MATERIAL: 0

## 2021-06-03 SDOH — ECONOMIC STABILITY: HOUSING INSECURITY
HOME SAFETY: FIRE ESCAPE PLAN DEVELOPED. PATIENT DOES NOT HAVE FLAMMABLE MATERIALS PRESENT IN THE HOME PRESENTING A FIRE HAZARD. NO EVIDENCE FOUND OF SMOKING MATERIALS PRESENT IN THE HOME.

## 2021-06-03 SDOH — ECONOMIC STABILITY: HOUSING INSECURITY
HOME SAFETY: OXYGEN SAFETY RISK ASSESSMENT PERFORMED. PATIENT DOES HAVE A NO SMOKING SIGN POSTED IN THE HOME. PATIENT DOES HAVE A WORKING FIRE EXTINGUISHER PRESENT IN THE HOME. SMOKE ALARMS ARE PRESENT AND FUNCTIONAL ON EACH LEVEL OF THE HOME. PATIENT DOES HAVE A

## 2021-06-03 ASSESSMENT — ENCOUNTER SYMPTOMS
MUSCLE WEAKNESS: 1
VOMITING: DENIES
NAUSEA: DENIES
LIMITED RANGE OF MOTION: 1

## 2021-06-03 NOTE — PROGRESS NOTES
Anticoagulation Summary  As of 6/3/2021    INR goal:  2.0-3.0   TTR:  12.4 % (1.6 mo)   INR used for dosin.50 (6/3/2021)   Warfarin maintenance plan:  9 mg (6 mg x 1.5) every Sun, Thu; 6 mg (6 mg x 1) all other days   Weekly warfarin total:  48 mg   Plan last modified:  Radha Jordan, Pharmacy Intern (6/3/2021)   Next INR check:  2021   Target end date:  Indefinite    Indications    Paroxysmal atrial fibrillation (HCC) [I48.0]             Anticoagulation Episode Summary     INR check location:      Preferred lab:      Send INR reminders to:      Comments:  [facility]        Anticoagulation Patient Findings      Spoke with patient.  INR is SUB therapeutic at 1.5.   Pt denies any unusual s/s of bleeding, bruising, clotting or any changes to diet or medications. Denies any etoh, cranberries, supplements, or illness.   Pt verifies warfarin weekly dosing.   Pt not on antiplatelet therapy     Pt takes warfarin in the morning - will have her take extra 1/2 tablet tonight for a total of 9mg, then continue with increased dosing regimen. Increased weekly total dose by 14% - 9mg on SUN and THUR, 6mg ROW    Repeat INR in 5 days    Consulted with José Miguel StevensD prior to recommendation   Radha Jordan, Pharmacy Intern

## 2021-06-03 NOTE — CASE COMMUNICATION
"Rashmi please fax to Sheridan Dinh at [phone]    Pt stated she felt she could not breathe yesterday evening when outside visiting with neighbors. Pt stated she asked neighbors to call 911, but \"nobody did.\" She stated she came back to room and just went back to sleep. Instructed pt to carry cell phone with her at all times and to contact 911 in the future if pt is feeling worsening symptoms unrelieved by inhalers. Denied cp/sob today during this assessment. O2 sat 96% 2L O2 n/c. Pt has appt next week with pulmonology."

## 2021-06-08 ENCOUNTER — HOME CARE VISIT (OUTPATIENT)
Dept: HOME HEALTH SERVICES | Facility: HOME HEALTHCARE | Age: 46
End: 2021-06-08
Payer: MEDICAID

## 2021-06-08 ENCOUNTER — ANTICOAGULATION MONITORING (OUTPATIENT)
Dept: VASCULAR LAB | Facility: MEDICAL CENTER | Age: 46
End: 2021-06-08

## 2021-06-08 ENCOUNTER — TELEPHONE (OUTPATIENT)
Dept: SLEEP MEDICINE | Facility: MEDICAL CENTER | Age: 46
End: 2021-06-08

## 2021-06-08 VITALS
HEART RATE: 68 BPM | RESPIRATION RATE: 16 BRPM | TEMPERATURE: 98 F | OXYGEN SATURATION: 96 % | DIASTOLIC BLOOD PRESSURE: 60 MMHG | SYSTOLIC BLOOD PRESSURE: 102 MMHG

## 2021-06-08 DIAGNOSIS — I48.0 PAROXYSMAL ATRIAL FIBRILLATION (HCC): Primary | ICD-10-CM

## 2021-06-08 LAB
INR PPP: 3.4 (ref 2–3.5)
INR PPP: 3.4 - CRITICAL LOW: < 0.8, CRITICAL HIGH: > 6.5 (ref 2–3.5)

## 2021-06-08 PROCEDURE — G0299 HHS/HOSPICE OF RN EA 15 MIN: HCPCS

## 2021-06-08 ASSESSMENT — ENCOUNTER SYMPTOMS
VOMITING: DENIES
NAUSEA: DENIES

## 2021-06-08 NOTE — PROGRESS NOTES
Anticoagulation Summary  As of 6/8/2021    INR goal:  2.0-3.0   TTR:  16.5 % (1.8 mo)   INR used for dosing:  3.40 (6/8/2021)   Warfarin maintenance plan:  9 mg (6 mg x 1.5) every Sun, Thu; 6 mg (6 mg x 1) all other days   Weekly warfarin total:  48 mg   Plan last modified:  Radha oJrdan, Pharmacy Intern (6/3/2021)   Next INR check:  6/10/2021   Target end date:  Indefinite    Indications    Paroxysmal atrial fibrillation (HCC) [I48.0]             Anticoagulation Episode Summary     INR check location:      Preferred lab:      Send INR reminders to:      Comments:  [facility]         Anticoagulation Patient Findings      Spoke with pt via phone.  INR is now SUPRA therapeutic.   Pt denies any unusual s/s of bleeding, bruising, clotting or any changes to diet or medications. Denies any etoh, cranberries, supplements, or illness.   Pt verifies warfarin weekly dosing.     Pt's already had taken full 6mg tablet today, instructed pt to take 3mg tomorrow only then repeat INR in 2 days.  - Asked pt to refrain from taking warfarin until after [facility] contacts her with results and instruction. Pt agreed        Repeat INR in 2 days via [facility] - order placed for [facility] to draw    Carlos Barros, HildaD

## 2021-06-10 ENCOUNTER — HOME CARE VISIT (OUTPATIENT)
Dept: HOME HEALTH SERVICES | Facility: HOME HEALTHCARE | Age: 46
End: 2021-06-10
Payer: MEDICAID

## 2021-06-10 ENCOUNTER — ANTICOAGULATION MONITORING (OUTPATIENT)
Dept: VASCULAR LAB | Facility: MEDICAL CENTER | Age: 46
End: 2021-06-10

## 2021-06-10 VITALS
HEART RATE: 75 BPM | OXYGEN SATURATION: 99 % | RESPIRATION RATE: 17 BRPM | DIASTOLIC BLOOD PRESSURE: 62 MMHG | TEMPERATURE: 97.7 F | SYSTOLIC BLOOD PRESSURE: 102 MMHG

## 2021-06-10 VITALS
SYSTOLIC BLOOD PRESSURE: 122 MMHG | HEART RATE: 85 BPM | TEMPERATURE: 98.3 F | DIASTOLIC BLOOD PRESSURE: 64 MMHG | OXYGEN SATURATION: 95 % | RESPIRATION RATE: 20 BRPM

## 2021-06-10 DIAGNOSIS — I48.0 PAROXYSMAL ATRIAL FIBRILLATION (HCC): ICD-10-CM

## 2021-06-10 LAB
INR PPP: 2.5 (ref 2–3.5)
INR PPP: 2.5 - CRITICAL LOW: < 0.8, CRITICAL HIGH: > 6.5 (ref 2–3.5)

## 2021-06-10 PROCEDURE — G0299 HHS/HOSPICE OF RN EA 15 MIN: HCPCS

## 2021-06-10 PROCEDURE — G0156 HHCP-SVS OF AIDE,EA 15 MIN: HCPCS

## 2021-06-10 SDOH — ECONOMIC STABILITY: HOUSING INSECURITY: EVIDENCE OF SMOKING MATERIAL: 0

## 2021-06-10 ASSESSMENT — ACTIVITIES OF DAILY LIVING (ADL)
AMBULATION ASSISTANCE: ONE PERSON
CURRENT_FUNCTION: ONE PERSON

## 2021-06-10 ASSESSMENT — ENCOUNTER SYMPTOMS
NAUSEA: DENIES
VOMITING: DENIES
DEPRESSED MOOD: 1
MUSCLE WEAKNESS: 1

## 2021-06-10 NOTE — PROGRESS NOTES
Anticoagulation Summary  As of 6/10/2021    INR goal:  2.0-3.0   TTR:  18.0 % (1.9 mo)   INR used for dosin.50 (6/10/2021)   Warfarin maintenance plan:  9 mg (6 mg x 1.5) every Sun; 6 mg (6 mg x 1) all other days   Weekly warfarin total:  45 mg   Plan last modified:  Kinjal Gant PharmD (6/10/2021)   Next INR check:  6/15/2021   Target end date:  Indefinite    Indications    Paroxysmal atrial fibrillation (HCC) [I48.0]             Anticoagulation Episode Summary     INR check location:      Preferred lab:      Send INR reminders to:      Comments:  [facility]         Anticoagulation Patient Findings      Spoke with patient to report a therapeutic INR.      Pt denies any s/s of bleeding, bruising, clotting or any changes to diet or medication.      Will have pt reduce weekly regimen by 7%    Will follow up in 5 days via [facility].     Kinjal Gant PharmD

## 2021-06-15 ENCOUNTER — HOME CARE VISIT (OUTPATIENT)
Dept: HOME HEALTH SERVICES | Facility: HOME HEALTHCARE | Age: 46
End: 2021-06-15
Payer: MEDICAID

## 2021-06-15 ENCOUNTER — ANTICOAGULATION MONITORING (OUTPATIENT)
Dept: CARDIOLOGY | Facility: MEDICAL CENTER | Age: 46
End: 2021-06-15

## 2021-06-15 DIAGNOSIS — I48.0 PAROXYSMAL ATRIAL FIBRILLATION (HCC): Primary | ICD-10-CM

## 2021-06-15 LAB
INR PPP: 1.9 (ref 2–3.5)
INR PPP: 1.9 - CRITICAL LOW: < 0.8, CRITICAL HIGH: > 6.5 (ref 2–3.5)

## 2021-06-15 PROCEDURE — G0299 HHS/HOSPICE OF RN EA 15 MIN: HCPCS

## 2021-06-15 ASSESSMENT — ENCOUNTER SYMPTOMS
POOR JUDGMENT: 1
NAUSEA: DENIES
VOMITING: DENIES

## 2021-06-15 NOTE — PROGRESS NOTES
Anticoagulation Summary  As of 6/15/2021    INR goal:  2.0-3.0   TTR:  23.5 % (2 mo)   INR used for dosin.90 (6/15/2021)   Warfarin maintenance plan:  9 mg (6 mg x 1.5) every Sun; 6 mg (6 mg x 1) all other days   Weekly warfarin total:  45 mg   Plan last modified:  Kinjal Gant PharmD (6/10/2021)   Next INR check:  2021   Target end date:  Indefinite    Indications    Paroxysmal atrial fibrillation (HCC) [I48.0]             Anticoagulation Episode Summary     INR check location:      Preferred lab:      Send INR reminders to:      Comments:  [facility]         Anticoagulation Patient Findings      Spoke with pt via phone.  INR is SUB therapeutic.   Pt denies any unusual s/s of bleeding, bruising, clotting or any changes to diet or medications. Denies any etoh, cranberries, supplements, or illness.   Pt verifies warfarin weekly dosing.     Will have pt BOLUS 1x to 9mg then Pt is to continue with current warfarin dosing regimen.      Repeat INR in 1 week(s).  [facility] to [facility]D

## 2021-06-16 ENCOUNTER — HOME CARE VISIT (OUTPATIENT)
Dept: HOME HEALTH SERVICES | Facility: HOME HEALTHCARE | Age: 46
End: 2021-06-16
Payer: MEDICAID

## 2021-06-16 VITALS
RESPIRATION RATE: 17 BRPM | SYSTOLIC BLOOD PRESSURE: 110 MMHG | HEART RATE: 81 BPM | DIASTOLIC BLOOD PRESSURE: 66 MMHG | TEMPERATURE: 96.9 F | OXYGEN SATURATION: 93 %

## 2021-06-16 PROCEDURE — G0156 HHCP-SVS OF AIDE,EA 15 MIN: HCPCS

## 2021-06-17 ENCOUNTER — APPOINTMENT (OUTPATIENT)
Dept: RADIOLOGY | Facility: MEDICAL CENTER | Age: 46
End: 2021-06-17
Attending: EMERGENCY MEDICINE
Payer: MEDICAID

## 2021-06-17 ENCOUNTER — PATIENT OUTREACH (OUTPATIENT)
Dept: HEALTH INFORMATION MANAGEMENT | Facility: OTHER | Age: 46
End: 2021-06-17

## 2021-06-17 ENCOUNTER — HOSPITAL ENCOUNTER (OUTPATIENT)
Facility: MEDICAL CENTER | Age: 46
End: 2021-06-17
Attending: EMERGENCY MEDICINE | Admitting: STUDENT IN AN ORGANIZED HEALTH CARE EDUCATION/TRAINING PROGRAM
Payer: MEDICAID

## 2021-06-17 VITALS
HEART RATE: 75 BPM | SYSTOLIC BLOOD PRESSURE: 124 MMHG | DIASTOLIC BLOOD PRESSURE: 65 MMHG | RESPIRATION RATE: 16 BRPM | WEIGHT: 287.7 LBS | OXYGEN SATURATION: 95 % | HEIGHT: 61 IN | BODY MASS INDEX: 54.32 KG/M2 | TEMPERATURE: 97.8 F

## 2021-06-17 DIAGNOSIS — R06.00 DYSPNEA, UNSPECIFIED TYPE: ICD-10-CM

## 2021-06-17 DIAGNOSIS — R07.9 CHEST PAIN, UNSPECIFIED TYPE: ICD-10-CM

## 2021-06-17 DIAGNOSIS — J44.1 ACUTE EXACERBATION OF CHRONIC OBSTRUCTIVE PULMONARY DISEASE (COPD) (HCC): ICD-10-CM

## 2021-06-17 PROBLEM — F17.200 NICOTINE DEPENDENCE: Status: ACTIVE | Noted: 2021-06-17

## 2021-06-17 PROBLEM — E78.5 HLD (HYPERLIPIDEMIA): Status: ACTIVE | Noted: 2021-06-17

## 2021-06-17 PROBLEM — I48.91 A-FIB (HCC): Status: RESOLVED | Noted: 2021-03-19 | Resolved: 2021-06-17

## 2021-06-17 LAB
ALBUMIN SERPL BCP-MCNC: 3.7 G/DL (ref 3.2–4.9)
ALBUMIN/GLOB SERPL: 1.1 G/DL
ALP SERPL-CCNC: 117 U/L (ref 30–99)
ALT SERPL-CCNC: 16 U/L (ref 2–50)
AMPHET UR QL SCN: NEGATIVE
ANION GAP SERPL CALC-SCNC: 9 MMOL/L (ref 7–16)
AST SERPL-CCNC: 13 U/L (ref 12–45)
BARBITURATES UR QL SCN: NEGATIVE
BASOPHILS # BLD AUTO: 0.2 % (ref 0–1.8)
BASOPHILS # BLD: 0.02 K/UL (ref 0–0.12)
BENZODIAZ UR QL SCN: NEGATIVE
BILIRUB SERPL-MCNC: 0.8 MG/DL (ref 0.1–1.5)
BUN SERPL-MCNC: 9 MG/DL (ref 8–22)
BZE UR QL SCN: NEGATIVE
CALCIUM SERPL-MCNC: 9.5 MG/DL (ref 8.5–10.5)
CANNABINOIDS UR QL SCN: NEGATIVE
CHLORIDE SERPL-SCNC: 104 MMOL/L (ref 96–112)
CO2 SERPL-SCNC: 29 MMOL/L (ref 20–33)
CREAT SERPL-MCNC: 1.38 MG/DL (ref 0.5–1.4)
D DIMER PPP IA.FEU-MCNC: <0.27 UG/ML (FEU) (ref 0–0.5)
EKG IMPRESSION: NORMAL
EKG IMPRESSION: NORMAL
EOSINOPHIL # BLD AUTO: 0.12 K/UL (ref 0–0.51)
EOSINOPHIL NFR BLD: 1 % (ref 0–6.9)
ERYTHROCYTE [DISTWIDTH] IN BLOOD BY AUTOMATED COUNT: 57.5 FL (ref 35.9–50)
EST. AVERAGE GLUCOSE BLD GHB EST-MCNC: 128 MG/DL
GLOBULIN SER CALC-MCNC: 3.3 G/DL (ref 1.9–3.5)
GLUCOSE SERPL-MCNC: 184 MG/DL (ref 65–99)
HBA1C MFR BLD: 6.1 % (ref 4–5.6)
HCT VFR BLD AUTO: 40.1 % (ref 37–47)
HGB BLD-MCNC: 12.1 G/DL (ref 12–16)
IMM GRANULOCYTES # BLD AUTO: 0.04 K/UL (ref 0–0.11)
IMM GRANULOCYTES NFR BLD AUTO: 0.3 % (ref 0–0.9)
INR PPP: 1.41 (ref 0.87–1.13)
LYMPHOCYTES # BLD AUTO: 2.12 K/UL (ref 1–4.8)
LYMPHOCYTES NFR BLD: 18.2 % (ref 22–41)
MAGNESIUM SERPL-MCNC: 2.2 MG/DL (ref 1.5–2.5)
MCH RBC QN AUTO: 28.1 PG (ref 27–33)
MCHC RBC AUTO-ENTMCNC: 30.2 G/DL (ref 33.6–35)
MCV RBC AUTO: 93 FL (ref 81.4–97.8)
METHADONE UR QL SCN: NEGATIVE
MONOCYTES # BLD AUTO: 0.57 K/UL (ref 0–0.85)
MONOCYTES NFR BLD AUTO: 4.9 % (ref 0–13.4)
NEUTROPHILS # BLD AUTO: 8.78 K/UL (ref 2–7.15)
NEUTROPHILS NFR BLD: 75.4 % (ref 44–72)
NRBC # BLD AUTO: 0 K/UL
NRBC BLD-RTO: 0 /100 WBC
NT-PROBNP SERPL IA-MCNC: 272 PG/ML (ref 0–125)
OPIATES UR QL SCN: NEGATIVE
OXYCODONE UR QL SCN: NEGATIVE
PCP UR QL SCN: NEGATIVE
PLATELET # BLD AUTO: 248 K/UL (ref 164–446)
PMV BLD AUTO: 11 FL (ref 9–12.9)
POTASSIUM SERPL-SCNC: 3.5 MMOL/L (ref 3.6–5.5)
PROPOXYPH UR QL SCN: NEGATIVE
PROT SERPL-MCNC: 7 G/DL (ref 6–8.2)
PROTHROMBIN TIME: 16.8 SEC (ref 12–14.6)
RBC # BLD AUTO: 4.31 M/UL (ref 4.2–5.4)
SARS-COV-2 RNA RESP QL NAA+PROBE: NOTDETECTED
SODIUM SERPL-SCNC: 142 MMOL/L (ref 135–145)
SPECIMEN SOURCE: NORMAL
TROPONIN T SERPL-MCNC: 18 NG/L (ref 6–19)
TROPONIN T SERPL-MCNC: 22 NG/L (ref 6–19)
TROPONIN T SERPL-MCNC: 22 NG/L (ref 6–19)
WBC # BLD AUTO: 11.7 K/UL (ref 4.8–10.8)

## 2021-06-17 PROCEDURE — 36415 COLL VENOUS BLD VENIPUNCTURE: CPT

## 2021-06-17 PROCEDURE — G0378 HOSPITAL OBSERVATION PER HR: HCPCS

## 2021-06-17 PROCEDURE — 97166 OT EVAL MOD COMPLEX 45 MIN: CPT

## 2021-06-17 PROCEDURE — 80053 COMPREHEN METABOLIC PANEL: CPT

## 2021-06-17 PROCEDURE — 85379 FIBRIN DEGRADATION QUANT: CPT

## 2021-06-17 PROCEDURE — 700102 HCHG RX REV CODE 250 W/ 637 OVERRIDE(OP): Performed by: STUDENT IN AN ORGANIZED HEALTH CARE EDUCATION/TRAINING PROGRAM

## 2021-06-17 PROCEDURE — 96374 THER/PROPH/DIAG INJ IV PUSH: CPT

## 2021-06-17 PROCEDURE — 85610 PROTHROMBIN TIME: CPT

## 2021-06-17 PROCEDURE — 83735 ASSAY OF MAGNESIUM: CPT

## 2021-06-17 PROCEDURE — 83880 ASSAY OF NATRIURETIC PEPTIDE: CPT

## 2021-06-17 PROCEDURE — 71045 X-RAY EXAM CHEST 1 VIEW: CPT

## 2021-06-17 PROCEDURE — 93005 ELECTROCARDIOGRAM TRACING: CPT | Performed by: EMERGENCY MEDICINE

## 2021-06-17 PROCEDURE — 93010 ELECTROCARDIOGRAM REPORT: CPT | Performed by: INTERNAL MEDICINE

## 2021-06-17 PROCEDURE — 700111 HCHG RX REV CODE 636 W/ 250 OVERRIDE (IP): Performed by: EMERGENCY MEDICINE

## 2021-06-17 PROCEDURE — A9270 NON-COVERED ITEM OR SERVICE: HCPCS | Performed by: STUDENT IN AN ORGANIZED HEALTH CARE EDUCATION/TRAINING PROGRAM

## 2021-06-17 PROCEDURE — 99220 PR INITIAL OBSERVATION CARE,LEVL III: CPT | Performed by: STUDENT IN AN ORGANIZED HEALTH CARE EDUCATION/TRAINING PROGRAM

## 2021-06-17 PROCEDURE — 93005 ELECTROCARDIOGRAM TRACING: CPT

## 2021-06-17 PROCEDURE — 83036 HEMOGLOBIN GLYCOSYLATED A1C: CPT

## 2021-06-17 PROCEDURE — 700102 HCHG RX REV CODE 250 W/ 637 OVERRIDE(OP): Performed by: EMERGENCY MEDICINE

## 2021-06-17 PROCEDURE — 80307 DRUG TEST PRSMV CHEM ANLYZR: CPT

## 2021-06-17 PROCEDURE — U0005 INFEC AGEN DETEC AMPLI PROBE: HCPCS

## 2021-06-17 PROCEDURE — A9270 NON-COVERED ITEM OR SERVICE: HCPCS | Performed by: EMERGENCY MEDICINE

## 2021-06-17 PROCEDURE — 93005 ELECTROCARDIOGRAM TRACING: CPT | Performed by: STUDENT IN AN ORGANIZED HEALTH CARE EDUCATION/TRAINING PROGRAM

## 2021-06-17 PROCEDURE — 84484 ASSAY OF TROPONIN QUANT: CPT

## 2021-06-17 PROCEDURE — 97161 PT EVAL LOW COMPLEX 20 MIN: CPT

## 2021-06-17 PROCEDURE — 85025 COMPLETE CBC W/AUTO DIFF WBC: CPT

## 2021-06-17 PROCEDURE — U0003 INFECTIOUS AGENT DETECTION BY NUCLEIC ACID (DNA OR RNA); SEVERE ACUTE RESPIRATORY SYNDROME CORONAVIRUS 2 (SARS-COV-2) (CORONAVIRUS DISEASE [COVID-19]), AMPLIFIED PROBE TECHNIQUE, MAKING USE OF HIGH THROUGHPUT TECHNOLOGIES AS DESCRIBED BY CMS-2020-01-R: HCPCS

## 2021-06-17 PROCEDURE — 99285 EMERGENCY DEPT VISIT HI MDM: CPT

## 2021-06-17 RX ORDER — POLYETHYLENE GLYCOL 3350 17 G/17G
1 POWDER, FOR SOLUTION ORAL
Status: DISCONTINUED | OUTPATIENT
Start: 2021-06-17 | End: 2021-06-17 | Stop reason: HOSPADM

## 2021-06-17 RX ORDER — NITROGLYCERIN 0.4 MG/1
0.4 TABLET SUBLINGUAL PRN
Qty: 25 TABLET | Refills: 0 | Status: SHIPPED | OUTPATIENT
Start: 2021-06-17 | End: 2022-10-23

## 2021-06-17 RX ORDER — PROMETHAZINE HYDROCHLORIDE 25 MG/1
12.5-25 TABLET ORAL EVERY 4 HOURS PRN
Status: DISCONTINUED | OUTPATIENT
Start: 2021-06-17 | End: 2021-06-17 | Stop reason: HOSPADM

## 2021-06-17 RX ORDER — POTASSIUM CHLORIDE 20 MEQ/1
20 TABLET, EXTENDED RELEASE ORAL DAILY
Status: DISCONTINUED | OUTPATIENT
Start: 2021-06-17 | End: 2021-06-17 | Stop reason: HOSPADM

## 2021-06-17 RX ORDER — ASPIRIN 300 MG/1
300 SUPPOSITORY RECTAL DAILY
Status: DISCONTINUED | OUTPATIENT
Start: 2021-06-18 | End: 2021-06-17 | Stop reason: HOSPADM

## 2021-06-17 RX ORDER — LABETALOL HYDROCHLORIDE 5 MG/ML
10 INJECTION, SOLUTION INTRAVENOUS EVERY 4 HOURS PRN
Status: DISCONTINUED | OUTPATIENT
Start: 2021-06-17 | End: 2021-06-17 | Stop reason: HOSPADM

## 2021-06-17 RX ORDER — ASPIRIN 81 MG/1
324 TABLET, CHEWABLE ORAL DAILY
Status: DISCONTINUED | OUTPATIENT
Start: 2021-06-18 | End: 2021-06-17 | Stop reason: HOSPADM

## 2021-06-17 RX ORDER — ASPIRIN 325 MG
325 TABLET ORAL DAILY
Status: DISCONTINUED | OUTPATIENT
Start: 2021-06-18 | End: 2021-06-17 | Stop reason: HOSPADM

## 2021-06-17 RX ORDER — FUROSEMIDE 40 MG/1
20 TABLET ORAL 2 TIMES DAILY
Status: DISCONTINUED | OUTPATIENT
Start: 2021-06-17 | End: 2021-06-17 | Stop reason: HOSPADM

## 2021-06-17 RX ORDER — ASPIRIN 81 MG/1
324 TABLET, CHEWABLE ORAL ONCE
Status: COMPLETED | OUTPATIENT
Start: 2021-06-17 | End: 2021-06-17

## 2021-06-17 RX ORDER — ONDANSETRON 2 MG/ML
4 INJECTION INTRAMUSCULAR; INTRAVENOUS EVERY 4 HOURS PRN
Status: DISCONTINUED | OUTPATIENT
Start: 2021-06-17 | End: 2021-06-17 | Stop reason: HOSPADM

## 2021-06-17 RX ORDER — METOPROLOL SUCCINATE 25 MG/1
25 TABLET, EXTENDED RELEASE ORAL DAILY
Status: DISCONTINUED | OUTPATIENT
Start: 2021-06-17 | End: 2021-06-17 | Stop reason: HOSPADM

## 2021-06-17 RX ORDER — PROCHLORPERAZINE EDISYLATE 5 MG/ML
5-10 INJECTION INTRAMUSCULAR; INTRAVENOUS EVERY 4 HOURS PRN
Status: DISCONTINUED | OUTPATIENT
Start: 2021-06-17 | End: 2021-06-17 | Stop reason: HOSPADM

## 2021-06-17 RX ORDER — NICOTINE 21 MG/24HR
21 PATCH, TRANSDERMAL 24 HOURS TRANSDERMAL
Status: DISCONTINUED | OUTPATIENT
Start: 2021-06-17 | End: 2021-06-17 | Stop reason: HOSPADM

## 2021-06-17 RX ORDER — ATORVASTATIN CALCIUM 20 MG/1
20 TABLET, FILM COATED ORAL DAILY
Status: DISCONTINUED | OUTPATIENT
Start: 2021-06-17 | End: 2021-06-17 | Stop reason: HOSPADM

## 2021-06-17 RX ORDER — ALBUTEROL SULFATE 90 UG/1
2 AEROSOL, METERED RESPIRATORY (INHALATION) EVERY 6 HOURS PRN
Status: DISCONTINUED | OUTPATIENT
Start: 2021-06-17 | End: 2021-06-17 | Stop reason: HOSPADM

## 2021-06-17 RX ORDER — BISACODYL 10 MG
10 SUPPOSITORY, RECTAL RECTAL
Status: DISCONTINUED | OUTPATIENT
Start: 2021-06-17 | End: 2021-06-17 | Stop reason: HOSPADM

## 2021-06-17 RX ORDER — PROMETHAZINE HYDROCHLORIDE 25 MG/1
12.5-25 SUPPOSITORY RECTAL EVERY 4 HOURS PRN
Status: DISCONTINUED | OUTPATIENT
Start: 2021-06-17 | End: 2021-06-17 | Stop reason: HOSPADM

## 2021-06-17 RX ORDER — ALBUTEROL SULFATE 90 UG/1
2 AEROSOL, METERED RESPIRATORY (INHALATION) ONCE
Status: COMPLETED | OUTPATIENT
Start: 2021-06-17 | End: 2021-06-17

## 2021-06-17 RX ORDER — WARFARIN SODIUM 7.5 MG/1
15 TABLET ORAL ONCE
Status: COMPLETED | OUTPATIENT
Start: 2021-06-17 | End: 2021-06-17

## 2021-06-17 RX ORDER — AMOXICILLIN 250 MG
2 CAPSULE ORAL 2 TIMES DAILY
Status: DISCONTINUED | OUTPATIENT
Start: 2021-06-17 | End: 2021-06-17 | Stop reason: HOSPADM

## 2021-06-17 RX ORDER — PREDNISONE 20 MG/1
60 TABLET ORAL ONCE
Status: COMPLETED | OUTPATIENT
Start: 2021-06-17 | End: 2021-06-17

## 2021-06-17 RX ORDER — ACETAMINOPHEN 325 MG/1
650 TABLET ORAL EVERY 6 HOURS PRN
Status: DISCONTINUED | OUTPATIENT
Start: 2021-06-17 | End: 2021-06-17 | Stop reason: HOSPADM

## 2021-06-17 RX ORDER — ONDANSETRON 4 MG/1
4 TABLET, ORALLY DISINTEGRATING ORAL EVERY 4 HOURS PRN
Status: DISCONTINUED | OUTPATIENT
Start: 2021-06-17 | End: 2021-06-17 | Stop reason: HOSPADM

## 2021-06-17 RX ORDER — CLONIDINE HYDROCHLORIDE 0.1 MG/1
0.1 TABLET ORAL EVERY 6 HOURS PRN
Status: DISCONTINUED | OUTPATIENT
Start: 2021-06-17 | End: 2021-06-17 | Stop reason: HOSPADM

## 2021-06-17 RX ORDER — ENALAPRILAT 1.25 MG/ML
1.25 INJECTION INTRAVENOUS EVERY 6 HOURS PRN
Status: DISCONTINUED | OUTPATIENT
Start: 2021-06-17 | End: 2021-06-17 | Stop reason: HOSPADM

## 2021-06-17 RX ORDER — IPRATROPIUM BROMIDE AND ALBUTEROL SULFATE 2.5; .5 MG/3ML; MG/3ML
3 SOLUTION RESPIRATORY (INHALATION)
Status: DISCONTINUED | OUTPATIENT
Start: 2021-06-17 | End: 2021-06-17 | Stop reason: HOSPADM

## 2021-06-17 RX ADMIN — ALBUTEROL SULFATE 2 PUFF: 90 AEROSOL, METERED RESPIRATORY (INHALATION) at 05:25

## 2021-06-17 RX ADMIN — PREDNISONE 60 MG: 20 TABLET ORAL at 05:24

## 2021-06-17 RX ADMIN — FENTANYL CITRATE 50 MCG: 50 INJECTION, SOLUTION INTRAMUSCULAR; INTRAVENOUS at 06:41

## 2021-06-17 RX ADMIN — NICOTINE TRANSDERMAL SYSTEM 21 MG: 21 PATCH, EXTENDED RELEASE TRANSDERMAL at 09:01

## 2021-06-17 RX ADMIN — ASPIRIN 324 MG: 81 TABLET, CHEWABLE ORAL at 05:23

## 2021-06-17 RX ADMIN — WARFARIN SODIUM 15 MG: 7.5 TABLET ORAL at 05:24

## 2021-06-17 RX ADMIN — FUROSEMIDE 20 MG: 40 TABLET ORAL at 09:00

## 2021-06-17 RX ADMIN — POTASSIUM CHLORIDE 20 MEQ: 1500 TABLET, EXTENDED RELEASE ORAL at 09:01

## 2021-06-17 RX ADMIN — METOPROLOL SUCCINATE 25 MG: 25 TABLET, EXTENDED RELEASE ORAL at 09:01

## 2021-06-17 RX ADMIN — ATORVASTATIN CALCIUM 20 MG: 20 TABLET, FILM COATED ORAL at 09:01

## 2021-06-17 ASSESSMENT — COGNITIVE AND FUNCTIONAL STATUS - GENERAL
SUGGESTED CMS G CODE MODIFIER DAILY ACTIVITY: CJ
SUGGESTED CMS G CODE MODIFIER MOBILITY: CK
HELP NEEDED FOR BATHING: A LITTLE
CLIMB 3 TO 5 STEPS WITH RAILING: A LITTLE
SUGGESTED CMS G CODE MODIFIER MOBILITY: CI
MOBILITY SCORE: 18
DAILY ACTIVITIY SCORE: 22
SUGGESTED CMS G CODE MODIFIER DAILY ACTIVITY: CJ
HELP NEEDED FOR BATHING: A LITTLE
WALKING IN HOSPITAL ROOM: A LITTLE
CLIMB 3 TO 5 STEPS WITH RAILING: A LITTLE
DRESSING REGULAR LOWER BODY CLOTHING: A LITTLE
MOBILITY SCORE: 23
DAILY ACTIVITIY SCORE: 22
STANDING UP FROM CHAIR USING ARMS: A LITTLE
MOVING FROM LYING ON BACK TO SITTING ON SIDE OF FLAT BED: A LITTLE
DRESSING REGULAR LOWER BODY CLOTHING: A LITTLE
MOVING TO AND FROM BED TO CHAIR: A LITTLE
TURNING FROM BACK TO SIDE WHILE IN FLAT BAD: A LITTLE

## 2021-06-17 ASSESSMENT — HEART SCORE
HISTORY: MODERATELY SUSPICIOUS
ECG: NORMAL
AGE: 45-64
RISK FACTORS: >2 RISK FACTORS OR HX OF ATHEROSCLEROTIC DISEASE
HEART SCORE: 5
TROPONIN: 1-3 TIMES NORMAL LIMIT

## 2021-06-17 ASSESSMENT — LIFESTYLE VARIABLES
HAVE PEOPLE ANNOYED YOU BY CRITICIZING YOUR DRINKING: NO
DOES PATIENT WANT TO STOP DRINKING: NO
TOTAL SCORE: 0
HOW MANY TIMES IN THE PAST YEAR HAVE YOU HAD 5 OR MORE DRINKS IN A DAY: 0
CONSUMPTION TOTAL: NEGATIVE
EVER FELT BAD OR GUILTY ABOUT YOUR DRINKING: NO
TOTAL SCORE: 0
HAVE YOU EVER FELT YOU SHOULD CUT DOWN ON YOUR DRINKING: NO
ALCOHOL_USE: NO
TOTAL SCORE: 0
AVERAGE NUMBER OF DAYS PER WEEK YOU HAVE A DRINK CONTAINING ALCOHOL: 0
ON A TYPICAL DAY WHEN YOU DRINK ALCOHOL HOW MANY DRINKS DO YOU HAVE: 0
EVER HAD A DRINK FIRST THING IN THE MORNING TO STEADY YOUR NERVES TO GET RID OF A HANGOVER: NO

## 2021-06-17 ASSESSMENT — PATIENT HEALTH QUESTIONNAIRE - PHQ9
1. LITTLE INTEREST OR PLEASURE IN DOING THINGS: NOT AT ALL
SUM OF ALL RESPONSES TO PHQ9 QUESTIONS 1 AND 2: 0
2. FEELING DOWN, DEPRESSED, IRRITABLE, OR HOPELESS: NOT AT ALL

## 2021-06-17 ASSESSMENT — ENCOUNTER SYMPTOMS
SHORTNESS OF BREATH: 1
COUGH: 0
FEVER: 0
NECK PAIN: 1
PALPITATIONS: 1
NAUSEA: 1

## 2021-06-17 ASSESSMENT — COPD QUESTIONNAIRES
DO YOU EVER COUGH UP ANY MUCUS OR PHLEGM?: YES, A FEW DAYS A WEEK OR MONTH
HAVE YOU SMOKED AT LEAST 100 CIGARETTES IN YOUR ENTIRE LIFE: YES
DURING THE PAST 4 WEEKS HOW MUCH DID YOU FEEL SHORT OF BREATH: SOME OF THE TIME
COPD SCREENING SCORE: 4

## 2021-06-17 ASSESSMENT — ACTIVITIES OF DAILY LIVING (ADL): TOILETING: INDEPENDENT

## 2021-06-17 ASSESSMENT — GAIT ASSESSMENTS
GAIT LEVEL OF ASSIST: SUPERVISED
ASSISTIVE DEVICE: 4 WHEEL WALKER
DISTANCE (FEET): 25
DISTANCE (FEET): 40

## 2021-06-17 ASSESSMENT — FIBROSIS 4 INDEX
FIB4 SCORE: 0.62
FIB4 SCORE: 0.98

## 2021-06-17 ASSESSMENT — PAIN DESCRIPTION - PAIN TYPE: TYPE: ACUTE PAIN

## 2021-06-17 NOTE — ED TRIAGE NOTES
"Lorene Haro  45 y.o. female  Chief Complaint   Patient presents with   • Chest Pain     Onset 0130 when lying on the couch talking to a friend \"my heart started beating fast and hurting.\" 9/10 tearing chest pain       Pt BIB EMS for above complaint. Patient has asthma and COPD. No difficulty breathing in ER.    EMS medicated:    324 ASA  4 mg zofran    Bilateral BP  /60  /56    Pt is alert and oriented, speaking in full sentences, follows commands and responds appropriately to questions. Resp are even and unlabored. No behavioral indicators of pain.     This RN masked and in appropriate PPE during encounter.     Vitals:    06/17/21 0255   BP: 124/56   Pulse: 81   Resp: 20   Temp: 36.7 °C (98 °F)   SpO2: 98%     "
dementia

## 2021-06-17 NOTE — PROGRESS NOTES
4 Eyes Skin Assessment Completed by JC Coburn and JC Manjarrez.    Head WDL  Ears WDL  Nose WDL  Mouth WDL  Neck WDL  Breast/Chest WDL  Shoulder Blades WDL  Spine WDL  (R) Arm/Elbow/Hand Blanching  (L) Arm/Elbow/Hand Blanching  Abdomen WDL  Groin Excoriation  Scrotum/Coccyx/Buttocks Blanching  (R) Leg Redness and Blanching  (L) Leg Redness  (R) Heel/Foot/Toe Blanching  (L) Heel/Foot/Toe Blanching          Devices In Places Tele Box, PIV,      Interventions In Place Pressure Redistribution Mattress    Possible Skin Injury No    Pictures Uploaded Into Epic N/A  Wound Consult Placed N/A  RN Wound Prevention Protocol Ordered No

## 2021-06-17 NOTE — ASSESSMENT & PLAN NOTE
20py hx of smoking and current 1/2 ppd smoker  Smoking education discussed and assistance in smoking cessation offered >5 minutes  Nicotine patch offered  States that she quit smoking cigarettes 2 days prior

## 2021-06-17 NOTE — ED NOTES
Rounded on patient. Patient resting in bed. No signs of distress noted. Equal chest rise and fall. Patient reports breathing has improved. No further needs at this time. Call light within reach.

## 2021-06-17 NOTE — THERAPY
Occupational Therapy   Initial Evaluation     Patient Name: Lorene Haro  Age:  45 y.o., Sex:  female  Medical Record #: [id number]  Today's Date: 6/17/2021     Precautions  Precautions: (P) Fall Risk    Assessment  Patient is 45 y.o. female with a diagnosis of episode of tachycardia and chest pain.  Additional factors influencing patient status / progress: support services in place. Appears to be functionally at baseline, no further skilled OT warranted at this time.      Plan    Recommend Occupational Therapy for Evaluation only for the following treatments:  NA.    DC Equipment Recommendations: (P) None  Discharge Recommendations: (P) Recommend home health for continued occupational therapy services     Subjective    Pleasant and cooperative     Objective       06/17/21 0825   Total Time Spent   Total Time Spent (Mins)    Charge Group   OT Evaluation OT Evaluation Mod   Initial Contact Note    Initial Contact Note Order Received and Verified, Evaluation Only - Patient Does Not Require Further Acute Occupational Therapy at this Time.  However, May Benefit from Post Acute Therapy for Higher Level Functional Deficits.   Prior Living Situation   Prior Services Intermittent Physical Support for ADL Per Service   Housing / Facility 2 Story Apartment / Condo   Steps Into Home 0   Steps In Home 0   Elevator Yes   Bathroom Set up Bathtub / Shower Combination;Grab Bars;Shower Chair   Equipment Owned 4-Wheel Walker   Lives with - Patient's Self Care Capacity Alone and Able to Care For Self   Prior Level of ADL Function   Self Feeding Independent   Grooming / Hygiene Independent   Bathing Requires Assist  (HHA/ PCA assist)   Dressing Independent   Toileting Independent   Comments reports she also has a care worker 2 x weekly   Prior Level of IADL Function   Medication Management Requires Assist   Laundry Requires Assist   Kitchen Mobility Independent   Finances Requires Assist   Home Management Requires Assist   Shopping  Requires Assist   Prior Level Of Mobility Independent With Device in Home   Driving / Transportation Utilizes Public Transportation;Relatives / Others Provide Transportation   History of Falls   History of Falls No   Precautions   Precautions Fall Risk   Vitals   O2 (LPM) 2   O2 Delivery Device Silicone Nasal Cannula   Pain 0 - 10 Group   Therapist Pain Assessment During Activity;Post Activity Pain Same as Prior to Activity;Nurse Notified;0   Cognition    Cognition / Consciousness WDL   Level of Consciousness Alert   Safety Awareness Impaired   New Learning Impaired   Comments limited processing, problem solving noted, appears to be baseline level   Level of Consciousness Alert   Passive ROM Upper Body   Passive ROM Upper Body WDL   Active ROM Upper Body   Active ROM Upper Body  WDL   Dominant Hand Right   Strength Upper Body   Upper Body Strength  WDL   Gross Strength Generalized Weakness, Equal Bilaterally.    Comments sufficient for basic functional activity   Sensation Upper Body   Upper Extremity Sensation  WDL   Upper Body Muscle Tone   Upper Body Muscle Tone  WDL   Coordination Upper Body   Coordination WDL   Balance Assessment   Sitting Balance (Static) Fair +   Sitting Balance (Dynamic) Fair +   Standing Balance (Static) Fair   Standing Balance (Dynamic) Fair   Weight Shift Sitting Fair   Weight Shift Standing Fair   Bed Mobility    Supine to Sit   (found seated in bedside chair)   Sit to Supine Supervised   Scooting Supervised   Rolling Supervised   ADL Assessment   Eating Supervision   Grooming Supervision;Seated;Standing   Bathing Minimal Assist   Upper Body Dressing Supervision   Lower Body Dressing Minimal Assist   Toileting Supervision   How much help from another person does the patient currently need...   Putting on and taking off regular lower body clothing? 3   Bathing (including washing, rinsing, and drying)? 3   Toileting, which includes using a toilet, bedpan, or urinal? 4   Putting on and  taking off regular upper body clothing? 4   Taking care of personal grooming such as brushing teeth? 4   Eating meals? 4   6 Clicks Daily Activity Score 22   Functional Mobility   Sit to Stand Supervised   Bed, Chair, Wheelchair Transfer Supervised   Toilet Transfers Supervised   Transfer Method Stand Step   Distance (Feet) 25   # of Times Distance was Traveled 2   Visual Perception   Visual Perception  WDL   Activity Tolerance   Sitting in Chair ad melissa   Sitting Edge of Bed ad melissa   Standing ad melissa   Education Group   Role of Occupational Therapist Patient Response Patient;Acceptance;Explanation;Demonstration;Verbal Demonstration;Action Demonstration   ADL Patient Response Patient;Acceptance;Explanation;Demonstration;Verbal Demonstration;Action Demonstration   Anticipated Discharge Equipment and Recommendations   DC Equipment Recommendations None   Discharge Recommendations Recommend home health for continued occupational therapy services   Interdisciplinary Plan of Care Collaboration   IDT Collaboration with  Nursing;Physical Therapist   Patient Position at End of Therapy In Bed;Call Light within Reach;Tray Table within Reach;Phone within Reach   Collaboration Comments report given   Session Information   Date / Session Number  6/17,1/1   Priority 0

## 2021-06-17 NOTE — DISCHARGE PLANNING
Anticipated Discharge Disposition: Home    Action: Spoke to pt at bedside, pt states she needs her mother to come and get her house key so that she can get her O2 and bring it to the hospital for her to dc with. Contacted pts lilibeth Lugo and explained the situation, Brittney states she will come to the hospital and get ps O2 to go home with. Pt and JC Coburn notified at bedside.    Barriers to Discharge: Mother to bring home O2    Plan: DC to home when mother brings home O2

## 2021-06-17 NOTE — ED PROVIDER NOTES
"ED Provider Note    Scribed for Lavern Mullins M.D. by Gonsalo Licea. 6/17/2021, 3:05 AM.    Primary care provider: SHANKAR Gleason  Means of arrival: EMS  History obtained from: Patient  History limited by: None    CHIEF COMPLAINT  Chief Complaint   Patient presents with   • Chest Pain     Onset 0130 when lying on the couch talking to a friend \"my heart started beating fast and hurting.\" 9/10 tearing chest pain       HPI  Lorene Haro is a 45 y.o. female with a history of hypertension and COPD who presents to the Emergency Department via EMS for evaluation of acute chest pain onset around 1:30 AM. She was laying on the couch when she started experiencing severe chest pain and heart palpitations. The pain radiates up the left side of her neck. She had associated shortness of breath during this episode as well. She is currently on Warfarin. She quit smoking two days ago. No fever or cough. EMS treated her with 324 mg Aspirin and 4 mg Zofran en route.    Reviewed records which indicated that patient has a history of paroxysmal atrial fibrillation which is why she's on Warfarin. Earlier this year she was admitted due to chest pain and she had a negative stress test in January. In March 2021 she had an ECHO which was normal with an ejection fraction of 60%.    REVIEW OF SYSTEMS  Review of Systems   Constitutional: Negative for fever.   Respiratory: Positive for shortness of breath. Negative for cough.    Cardiovascular: Positive for chest pain and palpitations.   Musculoskeletal: Positive for neck pain.   All other systems reviewed and are negative.      PAST MEDICAL HISTORY   has a past medical history of Asthma, Bipolar 2 disorder (HCC), Chickenpox, Chronic obstructive pulmonary disease (HCC), Hypertension, Other psychological stress, Schizophrenic disorder (HCC), and Yeast infection of the skin (4/1/2021).    SURGICAL HISTORY   has a past surgical history that includes hysterectomy laparoscopy; " colectomy; cholecystectomy; and knee arthroscopy (Left).    SOCIAL HISTORY  Social History     Tobacco Use   • Smoking status: Former Smoker     Packs/day: 1.00     Years: 20.00     Pack years: 20.00     Types: Cigarettes     Quit date: 2020     Years since quittin.4   • Smokeless tobacco: Never Used   • Tobacco comment: Patient states she quit two days ago    Vaping Use   • Vaping Use: Never used   Substance Use Topics   • Alcohol use: No   • Drug use: No      Social History     Substance and Sexual Activity   Drug Use No       FAMILY HISTORY  Family History   Problem Relation Age of Onset   • No Known Problems Mother    • Cancer Sister        CURRENT MEDICATIONS  Home Medications     Reviewed by Matt Oglesby R.N. (Registered Nurse) on 21 at 0259  Med List Status: Partial   Medication Last Dose Status   acetaminophen (TYLENOL) 500 MG Tab  Active   albuterol 108 (90 Base) MCG/ACT Aero Soln inhalation aerosol  Active   aripiprazole (ABILIFY) 30 MG tablet  Active   atorvastatin (LIPITOR) 20 MG Tab  Active   fluticasone (FLONASE) 50 MCG/ACT nasal spray  Active   furosemide (LASIX) 20 MG Tab  Active   Home Care Oxygen  Active   lithium CR (LITHOBID) 300 MG Tab CR  Active   metoprolol SR (TOPROL XL) 25 MG TABLET SR 24 HR  Active   montelukast (SINGULAIR) 10 MG Tab  Active   nystatin (MYCOSTATIN) powder  Active   potassium chloride SA (KDUR) 20 MEQ Tab CR  Active   tiotropium (SPIRIVA HANDIHALER) 18 MCG Cap  Active   tiotropium (SPIRIVA HANDIHALER) 18 MCG Cap  Active   warfarin (COUMADIN) 6 MG Tab  Active                ALLERGIES  Allergies   Allergen Reactions   • Amoxicillin Rash and Itching     Tolerates cephalosporins, pt reports that she gets a rash all over her body and gets itchy   • Penicillin G Rash and Itching     pt reports that she gets a rash all over her body and gets itchy       PHYSICAL EXAM  VITAL SIGNS: /56   Pulse 81   Temp 36.7 °C (98 °F) (Temporal)   Resp 20   Ht 1.549 m  "(5' 1\")   Wt (!) 132 kg (290 lb)   LMP  (LMP Unknown)   SpO2 98%   BMI 54.80 kg/m²   Vitals reviewed by myself.  Nursing note and vitals reviewed.  Constitutional: Well-developed and well-nourished. No acute distress.   HENT: Head is normocephalic and atraumatic.  Eyes: extra-ocular movements intact  Cardiovascular: Regular rate and regular rhythm. No murmur heard.  Pulmonary/Chest: Breath sounds normal. Faint expiratory wheezes  Abdominal: Soft and non-tender. No distention.    Musculoskeletal: Extremities exhibit normal range of motion without edema or tenderness.   Neurological: Awake and alert  Skin: Skin is warm and dry. No rash.       DIAGNOSTIC STUDIES /  LABS  Labs Reviewed   CBC WITH DIFFERENTIAL - Abnormal; Notable for the following components:       Result Value    WBC 11.7 (*)     MCHC 30.2 (*)     RDW 57.5 (*)     Neutrophils-Polys 75.40 (*)     Lymphocytes 18.20 (*)     Neutrophils (Absolute) 8.78 (*)     All other components within normal limits   COMP METABOLIC PANEL - Abnormal; Notable for the following components:    Potassium 3.5 (*)     Glucose 184 (*)     Alkaline Phosphatase 117 (*)     All other components within normal limits   TROPONIN - Abnormal; Notable for the following components:    Troponin T 22 (*)     All other components within normal limits   PROTHROMBIN TIME - Abnormal; Notable for the following components:    PT 16.8 (*)     INR 1.41 (*)     All other components within normal limits    Narrative:     Indicate which anticoagulants the patient is on:->COUMADIN   TROPONIN - Abnormal; Notable for the following components:    Troponin T 22 (*)     All other components within normal limits   ESTIMATED GFR - Abnormal; Notable for the following components:    GFR If  49 (*)     GFR If Non  41 (*)     All other components within normal limits   D-DIMER    Narrative:     Indicate which anticoagulants the patient is on:->COUMADIN   SARS-COV-2, PCR " (IN-HOUSE)   TROPONIN   MAGNESIUM   PROBRAIN NATRIURETIC PEPTIDE, NT   D-DIMER   URINE DRUG SCREEN   HEMOGLOBIN A1C                          All labs reviewed by me.    EKG Interpretation:  Interpreted by myself    12 Lead EKG interpreted by me to show:  EKG at 2:52 AM: Normal sinus rhythm, heart rate 83, frequent PACs, normal axis, normal intervals, , , QTc 442, nonspecific T wave inversions in inferior leads which are new when compared to prior EKG from May 2021, no evidence of acute arrhythmia or ischemia  My impression of this EKG: Does not indicate ischemia or arrhythmia at this time.    RADIOLOGY  DX-CHEST-PORTABLE (1 VIEW)   Final Result         1.  Interstitial pulmonary parenchymal prominence suggest chronic underlying lung disease, component of interstitial edema and/or infiltrates not excluded.        The radiologist's interpretation of all radiological studies have been reviewed by me.    REASSESSMEN    3:05 AM - Patient seen and examined at bedside. Discussed plan of care, including labs and imaging. Patient agrees to the plan of care. Ordered for labs and imaging to evaluate her symptoms.     4:19 AM - Patient was reevaluated at bedside. She is on her baseline 2 liters supplemental oxygen but still feels shortness of breath. She has faint expiratory wheezes on re exam. Patient treated with albuterol inhaler and Deltasone.    4:46 AM - Patient's INR was subtherapeutic. Pharmacy recommended a bolus dose of Warfarin 15 mg.    6:09 AM - Patient was reevaluated at bedside. She is still having chest pain. Treated with Fentanyl.    6:26 AM - I discussed the patient's case and the above findings with Dr. Mcneill (Hospitalist) who agrees to evaluate the patient for hospitalization.      COURSE & MEDICAL DECISION MAKING  Nursing notes, VS, PMSFHx reviewed in chart.    Patient is a 45-year-old female who comes in for evaluation of chest pain and shortness of breath. Differential diagnosis includes  arrhythmia, coronary syndrome, COPD exacerbation, pulmonary embolism. Diagnostic work-up includes labs, EKG and chest x-ray.    Patient's initial vitals are within normal limits, she is on her baseline home oxygen. She does have faint expiratory wheezes and is empirically treated for COPD exacerbation with albuterol and prednisone. She was given aspirin by EMS for her chest pain. Patient's EKG returns and demonstrates no evidence of acute ischemia, however patient does have nonspecific T wave inversions in inferior leads. Heart score is 5. Troponin x2 is indeterminate at 22. Patient continues to have chest pain in the emergency department. Given her new T wave inversions and mildly elevated troponin we will hospitalize her for ongoing cardiac monitoring. D-dimer is within normal limits making pulmonary embolism unlikely. INR was subtherapeutic and patient was given extra dose of warfarin. Discussed the case with Dr. Mcneill has accepted patient for hospitalization. Patient is in guarded condition.      DISPOSITION:  Patient will be hospitalized by Dr. Mcneill in guarded condition.    FINAL IMPRESSION  1. Chest pain, unspecified type    2. Acute exacerbation of chronic obstructive pulmonary disease (COPD) (HCC)    3. Dyspnea, unspecified type          I, Gonsalo Licea (Ade), am scribing for, and in the presence of, Lavern Mullins M.D..    Electronically signed by: Gonsalo Licea (Ade), 6/17/2021    ILavern M.D. personally performed the services described in this documentation, as scribed by Gonsalo Licea in my presence, and it is both accurate and complete.    The note accurately reflects work and decisions made by me.  Lavern Mullins M.D.  6/17/2021  6:40 AM

## 2021-06-17 NOTE — H&P
"Hospital Medicine History & Physical Note    Date of Service  6/17/2021    Primary Care Physician  SHANKAR Gleason    Consultants      Code Status  Full Code    Chief Complaint  Chief Complaint   Patient presents with   • Chest Pain     Onset 0130 when lying on the couch talking to a friend \"my heart started beating fast and hurting.\" 9/10 tearing chest pain       History of Presenting Illness  45 y.o. female with a past medical history of hypertension, COPD, nicotine dependence, paroxysmal A. fib currently taking warfarin who presented 6/17/2021  to the Emergency Department via EMS for evaluation of acute chest pain onset around 1:30 AM. She was laying on the couch when she started experiencing severe chest pain and heart palpitations. The pain radiates up the left side of her neck. She had associated shortness of breath during this episode as well. She quit smoking two days ago.  EMS treated her with 324 mg Aspirin and 4 mg Zofran en route.      Earlier this year she was admitted due to chest pain and she had a negative stress test in January. In March 2021 she had an ECHO which was normal with an ejection fraction of 60%.     Notable findings include : troponin 22, GFR 41, glucose 184, WBC 11.7    Chest x-ray showing:Interstitial pulmonary parenchymal prominence suggest chronic underlying lung disease, component of interstitial edema and/or infiltrates not excluded    EKG showing normal sinus rhythm with low voltage     Patient is admitted to hospitalist service for medical management.    Review of Systems  Review of Systems   Respiratory: Positive for shortness of breath.    Cardiovascular: Positive for chest pain and palpitations.   Gastrointestinal: Positive for nausea.   All other systems reviewed and are negative.      Past Medical History   has a past medical history of Asthma, Bipolar 2 disorder (HCC), Chickenpox, Chronic obstructive pulmonary disease (HCC), Hypertension, Other psychological stress, " Schizophrenic disorder (HCC), and Yeast infection of the skin (4/1/2021).  Reviewed  Surgical History   has a past surgical history that includes hysterectomy laparoscopy; colectomy; cholecystectomy; and knee arthroscopy (Left).  Reviewed    Family History  family history includes Cancer in her sister; No Known Problems in her mother..  Reviewed    Social History   reports that she quit smoking about 5 months ago. Her smoking use included cigarettes. She has a 20.00 pack-year smoking history. She has never used smokeless tobacco. She reports that she does not drink alcohol and does not use drugs.  Reviewed, she states that she stopped smoking cigarettes 2 days ago  Allergies  Allergies   Allergen Reactions   • Amoxicillin Rash and Itching     Tolerates cephalosporins, pt reports that she gets a rash all over her body and gets itchy   • Penicillin G Rash and Itching     pt reports that she gets a rash all over her body and gets itchy       Medications  Prior to Admission Medications   Prescriptions Last Dose Informant Patient Reported? Taking?   Home Care Oxygen   Yes No   Sig: Inhale 2 L/min continuous. Oxygen dose range: 2 L/min  Respiratory route via: Nasal Cannula   Oxygen supplier: [facility]   acetaminophen (TYLENOL) 500 MG Tab  Patient Yes No   Sig: Take 1,000 mg by mouth every 6 hours as needed (pain).   albuterol 108 (90 Base) MCG/ACT Aero Soln inhalation aerosol   No No   Sig: Inhale 2 Puffs every 6 hours as needed for Shortness of Breath.   aripiprazole (ABILIFY) 30 MG tablet  Patient Yes No   Sig: Take 30 mg by mouth every morning.   atorvastatin (LIPITOR) 20 MG Tab  Patient No No   Sig: Take 1 Tab by mouth every day.   fluticasone (FLONASE) 50 MCG/ACT nasal spray  Patient Yes No   Sig: Administer 1 Spray into affected nostril(S) 2 times a day.   furosemide (LASIX) 20 MG Tab  Patient No No   Sig: Take 1 tablet by mouth 2 times a day.   lithium CR (LITHOBID) 300 MG Tab CR  Patient Yes No   Sig: Take 300-600  mg by mouth 2 Times a Day. 1 caps = 300 mg every morning  2 caps = 600 mg every evening   metoprolol SR (TOPROL XL) 25 MG TABLET SR 24 HR  Patient No No   Sig: Take 1 tablet by mouth every day.   montelukast (SINGULAIR) 10 MG Tab  Patient No No   Sig: Take 1 Tab by mouth every bedtime.   nystatin (MYCOSTATIN) powder  Patient No No   Sig: Apply underneath breasts and on inner thigh area twice a day for fungal infection   Patient taking differently: Apply 1 Application topically 2 times a day as needed (fungal infection).   potassium chloride SA (KDUR) 20 MEQ Tab CR  Patient No No   Sig: Take 1 tablet by mouth every day.   tiotropium (SPIRIVA HANDIHALER) 18 MCG Cap  Patient No No   Sig: Inhale 1 Cap by mouth every day.   tiotropium (SPIRIVA HANDIHALER) 18 MCG Cap   No No   Sig: Place 1 capsule into inhaler and inhale every day.   warfarin (COUMADIN) 6 MG Tab  Patient No No   Sig: Take 1 tablet by mouth daily or as directed by anticoagulation clinic   Patient taking differently: Take 6 mg by mouth every day.      Facility-Administered Medications: None       Physical Exam  Temp:  [36.7 °C (98 °F)] 36.7 °C (98 °F)  Pulse:  [76-95] 78  Resp:  [16-20] 19  BP: (124-149)/(56-70) 140/70  SpO2:  [98 %-99 %] 98 %    Physical Exam     Constitutional: Resting in mild distress  HENT: Normocephalic, no obvious evidence of acute trauma.  Eyes: No scleral icterus. Normal conjunctiva   Neck: Comfortable movement without any obvious restriction in the range of motion.  Cardiovascular: Upon ascultation I appreciate a regular heart rhythm and a normal rate with no murmurs, rubs or gallops, TTP at chest, reproducible chest pain throughout  Thorax & Lungs:acute respiratory distress. No wheezing, rales or rhonchi heard on ausculation.  there is obvious chest wall tenderness. I appreciate normal air movement throughout.   Abdomen: The abdomen is not visibly distended. Upon palpation, I find it to be without tenderness.  No mass  appreciated.  Skin: erythema and edema at b/l LE with changes of venous stasis dermatitis  Extremities/Musculoskeletal: b/l  lower extremity edema with changes of venous stasis dermatitis  Neurologic: Alert & oriented. No focal deficits observed.   Psychiatric: Normal affect appropriate for the clinical situation.    Laboratory:  Recent Labs     06/17/21  0300   WBC 11.7*   RBC 4.31   HEMOGLOBIN 12.1   HEMATOCRIT 40.1   MCV 93.0   MCH 28.1   MCHC 30.2*   RDW 57.5*   PLATELETCT 248   MPV 11.0     Recent Labs     06/17/21  0300   SODIUM 142   POTASSIUM 3.5*   CHLORIDE 104   CO2 29   GLUCOSE 184*   BUN 9   CREATININE 1.38   CALCIUM 9.5     Recent Labs     06/17/21  0300   ALTSGPT 16   ASTSGOT 13   ALKPHOSPHAT 117*   TBILIRUBIN 0.8   GLUCOSE 184*     Recent Labs     06/15/21  0000 06/15/21  1331 06/17/21  0352   INR 1.90* 1.90* 1.41*     No results for input(s): NTPROBNP in the last 72 hours.      Recent Labs     06/17/21  0300 06/17/21  0521   TROPONINT 22* 22*       Imaging:  DX-CHEST-PORTABLE (1 VIEW)   Final Result         1.  Interstitial pulmonary parenchymal prominence suggest chronic underlying lung disease, component of interstitial edema and/or infiltrates not excluded.            Assessment/Plan:  I anticipate this patient is appropriate for observation status at this time.    Chest pain, rule out acute myocardial infarction- (present on admission)  Assessment & Plan  - tele monitor  - Stress test performed in January 2021  - f/u A1c and lipid panel  - TTE performed in 03/21 with EF 65%  - analgesics for symptom management  -reproducible chest pain on PE    Nicotine dependence- (present on admission)  Assessment & Plan  20py hx of smoking and current 1/2 ppd smoker  Smoking education discussed and assistance in smoking cessation offered >5 minutes  Nicotine patch offered  States that she quit smoking cigarettes 2 days prior    HLD (hyperlipidemia)- (present on admission)  Assessment & Plan  Continue home  meds  Lipid panel ordered to assess    Hyperglycemia- (present on admission)  Assessment & Plan  A1C ordered to assess  Takes no DM medications and denies receiving this diagnosis    Paroxysmal A-fib (HCC)  Assessment & Plan  PT/INR ordered to assess  Takes warfarin daily per patient    COPD (chronic obstructive pulmonary disease) ([facility])- (present on admission)  Assessment & Plan  Oxygen per protocol  duonebs  RT

## 2021-06-17 NOTE — THERAPY
"Physical Therapy   Initial Evaluation     Patient Name: Lorene Haro  Age:  45 y.o., Sex:  female  Medical Record #: [id number]  Today's Date: 6/17/2021     Precautions: Fall Risk    Assessment  Patient is 45 y.o. female with a diagnosis of Chest pain.  Additional factors influencing patient status / progress : today, pt appears to be close to baseline function, supervised for transfers, ambulation and bed mobility. Pt has been having assist at home to shower and Mom takes her to grocery store. Pt has a \"\" who assists with pt doing laundry and getting out into the community. Pt would benefit from home health to check her safety with showers, and to work towards doing them without assist needed. No further inpt PT needed..      Plan    Recommend Physical Therapy for Evaluation only    DC Equipment Recommendations: None  Discharge Recommendations: Recommend home health for continued physical therapy services  Patient will not be actively followed for physical therapy services at this time, however may be seen if requested by physician for 1 more visit within 30 days to address any discharge or equipment needs       Objective       06/17/21 1637   Prior Living Situation   Prior Services Skilled Home Health Services  (home nsg in to help shower & \"\" does community visits)   Housing / Facility   (2nd floor studio apt)   Steps Into Home 0   Steps In Home 0   Elevator Yes   Equipment Owned 4-Wheel Walker   Lives with - Patient's Self Care Capacity Alone and Able to Care For Self   Comments Sees Mom/sister a few times a week, Mom takes her grocery shopping.\"\" helps with laundry. Pt reports spending most of her time at home.    Prior Level of Functional Mobility   Bed Mobility Independent   Transfer Status Independent   Ambulation Independent   Distance Ambulation (Feet)   (community)   Assistive Devices Used 4-Wheel Walker   Stairs   (elevator)   Cognition    Level of Consciousness Alert   Comments " flat affect, but pleasant.    Gait Analysis   Gait Level Of Assist Supervised   Assistive Device 4 Wheel Walker   Distance (Feet) 40   Bed Mobility    Supine to Sit Supervised   Sit to Supine Supervised   Scooting Supervised   Rolling Supervised   Functional Mobility   Sit to Stand Supervised   Bed, Chair, Wheelchair Transfer Supervised   Anticipated Discharge Equipment and Recommendations   DC Equipment Recommendations None   Discharge Recommendations Recommend home health for continued physical therapy services

## 2021-06-17 NOTE — PROGRESS NOTES
Patient up from ED, report received. Patient placed on tele monitor, monitor room notified. Pt SR 74 on the monitor. Physical assessment performed. Patient is A&O X 4, pain level 4.  Patient oriented to room and unit routine. Patient educated on plan for the day and educated on use of call light.

## 2021-06-17 NOTE — ED NOTES
COVID swab collected and sent to lab. Medicated patient per MAR. Patient denies further needs. Call light within reach.

## 2021-06-17 NOTE — DIETARY
NUTRITION SERVICES: BMI - Pt with BMI >40 (=Body mass index is 54.36 kg/m².), Class III obesity. Weight loss counseling not appropriate in acute care setting. RECOMMEND - If appropriate at DC please refer to outpatient nutrition services for weight management.

## 2021-06-17 NOTE — DISCHARGE INSTRUCTIONS
Discharge Instructions    Discharged to home by car with relative. Discharged via wheelchair, hospital escort: Yes.  Special equipment needed: Not Applicable    Be sure to schedule a follow-up appointment with your primary care doctor or any specialists as instructed.     Discharge Plan:   Diet Plan: Discussed  Activity Level: Discussed  Confirmed Follow up Appointment: Appointment Scheduled  Confirmed Symptoms Management: Discussed  Medication Reconciliation Updated: Yes    I understand that a diet low in cholesterol, fat, and sodium is recommended for good health. Unless I have been given specific instructions below for another diet, I accept this instruction as my diet prescription.   Other diet: Cardiac    Special Instructions: Chronic Obstructive Pulmonary Disease (COPD) is a long-term, progressive lung disease that makes it harder to breathe. It includes chronic bronchitis, emphysema, and refractory (non-reversible) asthma. With COPD, the airways in your lungs become inflamed and thicken, and the tissue where oxygen is exchanged is destroyed. The flow of air in and out of your lungs decreases. When that happens, less oxygen gets into your body tissues, and it becomes harder to get rid of the waste gas carbon dioxide. As the disease gets worse, shortness of breath makes it harder to remain active. There is no cure for COPD, but it is preventable and treatable.    COPD Patient Discharge Instructions    • Diet  o Follow a low fat, low cholesterol, low salt diet unless instructed otherwise by your Doctor. Read the labels on the back of food products and track your intake of fat, cholesterol and salt.  • No smoking  o Discontinuing smoking will have the biggest impact on preventing progression of disease.  o To participate in [facility]’s Quit Tobacco Program, call 142-2932 or visit [email or web address]  • Oxygen  o If your doctor has order that you wear oxygen at home, it is important to wear it as  ordered.  o Do not smoke, vape, or use e-cigarettes with oxygen on.  o Store in an appropriate location: upright in its art or laid flat down, away from open flames and stoves.   o Do not use oil-based creams and moisturizers (ie: petroleum products, oil-based lip moisturizers) or aerosol sprays (ie: hair sprays or deodorants) when using your oxygen equipment.  o Be careful with tubing placement to prevent yourself and others from tripping.  • Medications  o Refer to your personalized Action Plan to manage your symptoms.  • Warning signs of an exacerbation  o Breathing fast and shallow, worsening shortness of breath (like you just finished exercising)  o Chest tightness  o Increases in sputum production  o Changes in sputum color  o Lower oxygen levels than baseline  • When to call your doctor  o If the warning signs of an exacerbation do not improve  o Refer to your personalized Action Plan   • Pulmonary Rehab  o Your doctor has ordered you a referral to Pulmonary Rehab. Call [phone] to schedule an appointment  • Attend your follow-up appointment with your PCP and/or Pulmonologist  • Remote Monitoring: At the direction of the remote monitoring on-call provider, you may increase your oxygen by 2 liters above your baseline.     See the educational handout provided by your COPD Navigator for more information. This also explains more about COPD, symptoms of an exacerbation, and some of the tests that you have undergone.    · Is patient discharged on Warfarin / Coumadin?   Yes    You are receiving the drug warfarin. Please understand the importance of monitoring warfarin with scheduled PT/INR blood draws.  Follow-up with a call to your personal Doctor's office in 3 days to schedule a PT/INR. .    IMPORTANT: HOW TO USE THIS INFORMATION:  This is a summary and does NOT have all possible information about this product. This information does not assure that this product is safe, effective, or appropriate for you. This  "information is not individual medical advice and does not substitute for the advice of your health care professional. Always ask your health care professional for complete information about this product and your specific health needs.      WARFARIN - ORAL (WARF-uh-rin)      COMMON BRAND NAME(S): Coumadin      WARNING:  Warfarin can cause very serious (possibly fatal) bleeding. This is more likely to occur when you first start taking this medication or if you take too much warfarin. To decrease your risk for bleeding, your doctor or other health care provider will monitor you closely and check your lab results (INR test) to make sure you are not taking too much warfarin. Keep all medical and laboratory appointments. Tell your doctor right away if you notice any signs of serious bleeding. See also Side Effects section.      USES:  This medication is used to treat blood clots (such as in deep vein thrombosis-DVT or pulmonary embolus-PE) and/or to prevent new clots from forming in your body. Preventing harmful blood clots helps to reduce the risk of a stroke or heart attack. Conditions that increase your risk of developing blood clots include a certain type of irregular heart rhythm (atrial fibrillation), heart valve replacement, recent heart attack, and certain surgeries (such as hip/knee replacement). Warfarin is commonly called a \"blood thinner,\" but the more correct term is \"anticoagulant.\" It helps to keep blood flowing smoothly in your body by decreasing the amount of certain substances (clotting proteins) in your blood.      HOW TO USE:  Read the Medication Guide provided by your pharmacist before you start taking warfarin and each time you get a refill. If you have any questions, ask your doctor or pharmacist. Take this medication by mouth with or without food as directed by your doctor or other health care professional, usually once a day. It is very important to take it exactly as directed. Do not increase the " dose, take it more frequently, or stop using it unless directed by your doctor. Dosage is based on your medical condition, laboratory tests (such as INR), and response to treatment. Your doctor or other health care provider will monitor you closely while you are taking this medication to determine the right dose for you. Use this medication regularly to get the most benefit from it. To help you remember, take it at the same time each day. It is important to eat a balanced, consistent diet while taking warfarin. Some foods can affect how warfarin works in your body and may affect your treatment and dose. Avoid sudden large increases or decreases in your intake of foods high in vitamin K (such as broccoli, cauliflower, cabbage, brussels sprouts, kale, spinach, and other green leafy vegetables, liver, green tea, certain vitamin supplements). If you are trying to lose weight, check with your doctor before you try to go on a diet. Cranberry products may also affect how your warfarin works. Limit the amount of cranberry juice (16 ounces/480 milliliters a day) or other cranberry products you may drink or eat.      SIDE EFFECTS:  Nausea, loss of appetite, or stomach/abdominal pain may occur. If any of these effects persist or worsen, tell your doctor or pharmacist promptly. Remember that your doctor has prescribed this medication because he or she has judged that the benefit to you is greater than the risk of side effects. Many people using this medication do not have serious side effects. This medication can cause serious bleeding if it affects your blood clotting proteins too much (shown by unusually high INR lab results). Even if your doctor stops your medication, this risk of bleeding can continue for up to a week. Tell your doctor right away if you have any signs of serious bleeding, including: unusual pain/swelling/discomfort, unusual/easy bruising, prolonged bleeding from cuts or gums, persistent/frequent nosebleeds,  unusually heavy/prolonged menstrual flow, pink/dark urine, coughing up blood, vomit that is bloody or looks like coffee grounds, severe headache, dizziness/fainting, unusual or persistent tiredness/weakness, bloody/black/tarry stools, chest pain, shortness of breath, difficulty swallowing. Tell your doctor right away if any of these unlikely but serious side effects occur: persistent nausea/vomiting, severe stomach/abdominal pain, yellowing eyes/skin. This drug rarely has caused very serious (possibly fatal) problems if its effects lead to small blood clots (usually at the beginning of treatment). This can lead to severe skin/tissue damage that may require surgery or amputation if left untreated. Patients with certain blood conditions (protein C or S deficiency) may be at greater risk. Get medical help right away if any of these rare but serious side effects occur: painful/red/purplish patches on the skin (such as on the toe, breast, abdomen), change in the amount of urine, vision changes, confusion, slurred speech, weakness on one side of the body. A very serious allergic reaction to this drug is rare. However, get medical help right away if you notice any symptoms of a serious allergic reaction, including: rash, itching/swelling (especially of the face/tongue/throat), severe dizziness, trouble breathing. This is not a complete list of possible side effects. If you notice other effects not listed above, contact your doctor or pharmacist. In the [place] - Call your doctor for medical advice about side effects. You may report side effects to FDA at 5-433-YLM-6612. In [place] - Call your doctor for medical advice about side effects. You may report side effects to [facility] [place] at 1-273.882.3033.      PRECAUTIONS:  Before taking warfarin, tell your doctor or pharmacist if you are allergic to it; or if you have any other allergies. This product may contain inactive ingredients, which can cause allergic reactions or other  problems. Talk to your pharmacist for more details. Before using this medication, tell your doctor or pharmacist your medical history, especially of: blood disorders (such as anemia, hemophilia), bleeding problems (such as bleeding of the stomach/intestines, bleeding in the brain), blood vessel disorders (such as aneurysms), recent major injury/surgery, liver disease, alcohol use, mental/mood disorders (including memory problems), frequent falls/injuries. It is important that all your doctors and dentists know that you take warfarin. Before having surgery or any medical/dental procedures, tell your doctor or dentist that you are taking this medication and about all the products you use (including prescription drugs, nonprescription drugs, and herbal products). Avoid getting injections into the muscles. If you must have an injection into a muscle (for example, a flu shot), it should be given in the arm. This way, it will be easier to check for bleeding and/or apply pressure bandages. This medication may cause stomach bleeding. Daily use of alcohol while using this medicine will increase your risk for stomach bleeding and may also affect how this medication works. Limit or avoid alcoholic beverages. If you have not been eating well, if you have an illness or infection that causes fever, vomiting, or diarrhea for more than 2 days, or if you start using any antibiotic medications, contact your doctor or pharmacist immediately because these conditions can affect how warfarin works. This medication can cause heavy bleeding. To lower the chance of getting cut, bruised, or injured, use great caution with sharp objects like safety razors and nail cutters. Use an electric razor when shaving and a soft toothbrush when brushing your teeth. Avoid activities such as contact sports. If you fall or injure yourself, especially if you hit your head, call your doctor immediately. Your doctor may need to check you. The Food & Drug  "Administration has stated that generic warfarin products are interchangeable. However, consult your doctor or pharmacist before switching warfarin products. Be careful not to take more than one medication that contains warfarin unless specifically directed by the doctor or health care provider who is monitoring your warfarin treatment. Older adults may be at greater risk for bleeding while using this drug. This medication is not recommended for use during pregnancy because of serious (possibly fatal) harm to an unborn baby. Discuss the use of reliable forms of birth control with your doctor. If you become pregnant or think you may be pregnant, tell your doctor immediately. If you are planning pregnancy, discuss a plan for managing your condition with your doctor before you become pregnant. Your doctor may switch the type of medication you use during pregnancy. Very small amounts of this medication may pass into breast milk but is unlikely to harm a nursing infant. Consult your doctor before breast-feeding.      DRUG INTERACTIONS:  Drug interactions may change how your medications work or increase your risk for serious side effects. This document does not contain all possible drug interactions. Keep a list of all the products you use (including prescription/nonprescription drugs and herbal products) and share it with your doctor and pharmacist. Do not start, stop, or change the dosage of any medicines without your doctor's approval. Warfarin interacts with many prescription, nonprescription, vitamin, and herbal products. This includes medications that are applied to the skin or inside the vagina or rectum. The interactions with warfarin usually result in an increase or decrease in the \"blood-thinning\" (anticoagulant) effect. Your doctor or other health care professional should closely monitor you to prevent serious bleeding or clotting problems. While taking warfarin, it is very important to tell your doctor or " pharmacist of any changes in medications, vitamins, or herbal products that you are taking. Some products that may interact with this drug include: capecitabine, imatinib, mifepristone. Aspirin, aspirin-like drugs (salicylates), and nonsteroidal anti-inflammatory drugs (NSAIDs such as ibuprofen, naproxen, celecoxib) may have effects similar to warfarin. These drugs may increase the risk of bleeding problems if taken during treatment with warfarin. Carefully check all prescription/nonprescription product labels (including drugs applied to the skin such as pain-relieving creams) since the products may contain NSAIDs or salicylates. Talk to your doctor about using a different medication (such as acetaminophen) to treat pain/fever. Low-dose aspirin and related drugs (such as clopidogrel, ticlopidine) should be continued if prescribed by your doctor for specific medical reasons such as heart attack or stroke prevention. Consult your doctor or pharmacist for more details. Many herbal products interact with warfarin. Tell your doctor before taking any herbal products, especially bromelains, coenzyme Q10, cranberry, danshen, dong quai, fenugreek, garlic, ginkgo biloba, ginseng, and Zahra's wort, among others. This medication may interfere with a certain laboratory test to measure theophylline levels, possibly causing false test results. Make sure laboratory personnel and all your doctors know you use this drug.      OVERDOSE:  If overdose is suspected, contact a poison control center or emergency room immediately. [place] residents can call the [place] National Poison Hotline at 1-113.873.3117. [place] residents can call a provincial poison control center. Symptoms of overdose may include: bloody/black/tarry stools, pink/dark urine, unusual/prolonged bleeding.      NOTES:  Do not share this medication with others. Laboratory and/or medical tests (such as INR, complete blood count) must be performed periodically to monitor your  progress or check for side effects. Consult your doctor for more details.      MISSED DOSE:  For the best possible benefit, do not miss any doses. If you do miss a dose and remember on the same day, take it as soon as you remember. If you remember on the next day, skip the missed dose and resume your usual dosing schedule. Do not double the dose to catch up because this could increase your risk for bleeding. Keep a record of missed doses to give to your doctor or pharmacist. Contact your doctor or pharmacist if you miss 2 or more doses in a row.      STORAGE:  Store at room temperature away from light and moisture. Do not store in the bathroom. Keep all medications away from children and pets. Do not flush medications down the toilet or pour them into a drain unless instructed to do so. Properly discard this product when it is  or no longer needed. Consult your pharmacist or local waste disposal company for more details about how to safely discard your product.      MEDICAL ALERT:  Your condition and medication can cause complications in a medical emergency. For information about enrolling in MedicAlert, call 1-476.906.7787 ([place]) or 1-332.268.9830 ([place]).      Information last revised 2010 Copyright(c) 2010 [employer]             Depression / Suicide Risk    As you are discharged from this [facility] facility, it is important to learn how to keep safe from harming yourself.    Recognize the warning signs:  · Abrupt changes in personality, positive or negative- including increase in energy   · Giving away possessions  · Change in eating patterns- significant weight changes-  positive or negative  · Change in sleeping patterns- unable to sleep or sleeping all the time   · Unwillingness or inability to communicate  · Depression  · Unusual sadness, discouragement and loneliness  · Talk of wanting to die  · Neglect of personal appearance   · Rebelliousness- reckless behavior  · Withdrawal  from people/activities they love  · Confusion- inability to concentrate     If you or a loved one observes any of these behaviors or has concerns about self-harm, here's what you can do:  · Talk about it- your feelings and reasons for harming yourself  · Remove any means that you might use to hurt yourself (examples: pills, rope, extension cords, firearm)  · Get professional help from the community (Mental Health, Substance Abuse, psychological counseling)  · Do not be alone:Call your Safe Contact- someone whom you trust who will be there for you.  · Call your local CRISIS HOTLINE 594-4361 or 800-370-8943  · Call your local Children's Mobile Crisis Response Team [place] (452) 827-7682 or www.[employer]  · Call the toll free National Suicide Prevention Hotlines   · National Suicide Prevention Lifeline 010-509-ZSHE (2689)  · National [place] Line Network 800-SUICIDE (041-7308)        Nitroglycerin sublingual tablets  What is this medicine?  NITROGLYCERIN (mirna troe GLI ser in) is a type of vasodilator. It relaxes blood vessels, increasing the blood and oxygen supply to your heart. This medicine is used to relieve chest pain caused by angina. It is also used to prevent chest pain before activities like climbing stairs, going outdoors in cold weather, or sexual activity.  This medicine may be used for other purposes; ask your health care provider or pharmacist if you have questions.  COMMON BRAND NAME(S): Nitroquick, Nitrostat, Nitrotab  What should I tell my health care provider before I take this medicine?  They need to know if you have any of these conditions:  · anemia  · head injury, recent stroke, or bleeding in the brain  · liver disease  · previous heart attack  · an unusual or allergic reaction to nitroglycerin, other medicines, foods, dyes, or preservatives  · pregnant or trying to get pregnant  · breast-feeding  How should I use this medicine?  Take this medicine by mouth as needed. At the first sign  of an angina attack (chest pain or tightness) place one tablet under your tongue. You can also take this medicine 5 to 10 minutes before an event likely to produce chest pain. Follow the directions on the prescription label. Let the tablet dissolve under the tongue. Do not swallow whole. Replace the dose if you accidentally swallow it. It will help if your mouth is not dry. Saliva around the tablet will help it to dissolve more quickly. Do not eat or drink, smoke or chew tobacco while a tablet is dissolving. If you are not better within 5 minutes after taking ONE dose of nitroglycerin, call 9-1-1 immediately to seek emergency medical care. Do not take more than 3 nitroglycerin tablets over 15 minutes.  If you take this medicine often to relieve symptoms of angina, your doctor or health care professional may provide you with different instructions to manage your symptoms. If symptoms do not go away after following these instructions, it is important to call 9-1-1 immediately. Do not take more than 3 nitroglycerin tablets over 15 minutes.  Talk to your pediatrician regarding the use of this medicine in children. Special care may be needed.  Overdosage: If you think you have taken too much of this medicine contact a poison control center or emergency room at once.  NOTE: This medicine is only for you. Do not share this medicine with others.  What if I miss a dose?  This does not apply. This medicine is only used as needed.  What may interact with this medicine?  Do not take this medicine with any of the following medications:  · certain migraine medicines like ergotamine and dihydroergotamine (DHE)  · medicines used to treat erectile dysfunction like sildenafil, tadalafil, and vardenafil  · riociguat  This medicine may also interact with the following medications:  · alteplase  · aspirin  · heparin  · medicines for high blood pressure  · medicines for mental depression  · other medicines used to treat  angina  · phenothiazines like chlorpromazine, mesoridazine, prochlorperazine, thioridazine  This list may not describe all possible interactions. Give your health care provider a list of all the medicines, herbs, non-prescription drugs, or dietary supplements you use. Also tell them if you smoke, drink alcohol, or use illegal drugs. Some items may interact with your medicine.  What should I watch for while using this medicine?  Tell your doctor or health care professional if you feel your medicine is no longer working.  Keep this medicine with you at all times. Sit or lie down when you take your medicine to prevent falling if you feel dizzy or faint after using it. Try to remain calm. This will help you to feel better faster. If you feel dizzy, take several deep breaths and lie down with your feet propped up, or bend forward with your head resting between your knees.  You may get drowsy or dizzy. Do not drive, use machinery, or do anything that needs mental alertness until you know how this drug affects you. Do not stand or sit up quickly, especially if you are an older patient. This reduces the risk of dizzy or fainting spells. Alcohol can make you more drowsy and dizzy. Avoid alcoholic drinks.  Do not treat yourself for coughs, colds, or pain while you are taking this medicine without asking your doctor or health care professional for advice. Some ingredients may increase your blood pressure.  What side effects may I notice from receiving this medicine?  Side effects that you should report to your doctor or health care professional as soon as possible:  · blurred vision  · dry mouth  · skin rash  · sweating  · the feeling of extreme pressure in the head  · unusually weak or tired  Side effects that usually do not require medical attention (report to your doctor or health care professional if they continue or are bothersome):  · flushing of the face or neck  · headache  · irregular heartbeat,  palpitations  · nausea, vomiting  This list may not describe all possible side effects. Call your doctor for medical advice about side effects. You may report side effects to FDA at 5-730-RFS-0269.  Where should I keep my medicine?  Keep out of the reach of children.  Store at room temperature between 20 and 25 degrees C (68 and 77 degrees F). Store in original container. Protect from light and moisture. Keep tightly closed. Throw away any unused medicine after the expiration date.  NOTE: This sheet is a summary. It may not cover all possible information. If you have questions about this medicine, talk to your doctor, pharmacist, or health care provider.  © 2020 [employer]vier/Gold Standard (2014-10-16 17:57:36)

## 2021-06-17 NOTE — ED NOTES
Bedside report given to T813 JC Triana. Patient transported with ACLS RN on Zoll monitor. All patient belongings and chart sent with patient. Patient care transferred. Receiving RN assuming care.

## 2021-06-17 NOTE — CARE PLAN
The patient is Watcher - Medium risk of patient condition declining or worsening         Progress made toward(s) clinical / shift goals:  Rest comfortably       Problem: Knowledge Deficit - Standard  Goal: Patient and family/care givers will demonstrate understanding of plan of care, disease process/condition, diagnostic tests and medications  Outcome: Progressing  Note: Pt aware of plan to monitor chest pain

## 2021-06-17 NOTE — PROGRESS NOTES
Pt being discharged. Pt educated on chest pain and received instructions. New prescriptions given and pt verbalized understanding of all medications. Follow up appt made with PCP. PIV removed. Monitor checked in, monitor room notified. All personal belongings with pt. Pt going home with mother. RN to call transport for escort out. Will monitor pt until off unit.

## 2021-06-17 NOTE — ASSESSMENT & PLAN NOTE
- tele monitor  - Stress test performed in January 2021  - f/u A1c and lipid panel  - TTE performed in 03/21 with EF 65%  - analgesics for symptom management  -reproducible chest pain on PE

## 2021-06-18 NOTE — DISCHARGE SUMMARY
"Discharge Summary    CHIEF COMPLAINT ON ADMISSION  Chief Complaint   Patient presents with   • Chest Pain     Onset 0130 when lying on the couch talking to a friend \"my heart started beating fast and hurting.\" 9/10 tearing chest pain       Reason for Admission  Chest pain     Admission Date  6/17/2021    CODE STATUS  FULL    HPI & HOSPITAL COURSE  Per Dr. Mcneill's HPI:  \"45 y.o. female with a past medical history of hypertension, COPD, nicotine dependence, paroxysmal A. fib currently taking warfarin who presented 6/17/2021  to the Emergency Department via EMS for evaluation of acute chest pain onset around 1:30 AM. She was laying on the couch when she started experiencing severe chest pain and heart palpitations. The pain radiates up the left side of her neck. She had associated shortness of breath during this episode as well. She quit smoking two days ago.  EMS treated her with 324 mg Aspirin and 4 mg Zofran en route.      Earlier this year she was admitted due to chest pain and she had a negative stress test in January. In March 2021 she had an ECHO which was normal with an ejection fraction of 60%.      Notable findings include : troponin 22, GFR 41, glucose 184, WBC 11.7     Chest x-ray showing:Interstitial pulmonary parenchymal prominence suggest chronic underlying lung disease, component of interstitial edema and/or infiltrates not excluded     EKG showing normal sinus rhythm with low voltage      Patient is admitted to hospitalist service for medical management.\"    On Telemetry, stable vitals and afebrile. Telemonitor grossly unremarkable. Chest pain essentially free in AM. Repeated troponin trended down and repeated EKG NSR, no acute ST/T changes. At this point, DC home planned discussed with a close outpatient follow up. Pt can resume home health services.     Therefore, she is discharged in fair and stable condition to home with close outpatient follow-up.    The patient recovered much more quickly than " anticipated on admission.    Discharge Date  6/17/2021    FOLLOW UP ITEMS POST DISCHARGE  N/A    Physical Exam  Vitals and nursing note reviewed.   Constitutional:       General: She is not in acute distress.     Appearance: She is obese.      Comments: Well developed   HENT:      Head: Normocephalic and atraumatic.      Mouth/Throat:      Pharynx: No oropharyngeal exudate.   Eyes:      General: No scleral icterus.     Conjunctiva/sclera: Conjunctivae normal.   Cardiovascular:      Rate and Rhythm: Normal rate and regular rhythm.      Heart sounds: Normal heart sounds. No murmur heard.     Pulmonary:      Effort: Pulmonary effort is normal. No respiratory distress.      Breath sounds: Normal breath sounds. No wheezing.   Abdominal:      General: Bowel sounds are normal. There is no distension.      Palpations: Abdomen is soft.      Tenderness: There is no abdominal tenderness. There is no rebound.   Musculoskeletal:         General: No tenderness. Normal range of motion.      Cervical back: Normal range of motion and neck supple.   Skin:     General: Skin is warm and dry.   Neurological:      Mental Status: She is alert and oriented to person, place, and time.      Cranial Nerves: No cranial nerve deficit.      Coordination: Coordination normal.   Psychiatric:         Mood and Affect: Affect normal.         DISCHARGE DIAGNOSES  Principal Problem:    Chest pain, rule out acute myocardial infarction POA: Yes  Active Problems:    COPD (chronic obstructive pulmonary disease) (HCC) POA: Yes    Bipolar Disorder POA: Yes    Cardiomegaly POA: Yes    KATHIA and COPD overlap syndrome (HCC) POA: Yes    Paroxysmal A-fib (HCC) POA: Unknown    Hyperglycemia POA: Yes    HLD (hyperlipidemia) POA: Yes    Nicotine dependence POA: Yes  Resolved Problems:    * No resolved hospital problems. *      FOLLOW UP  Future Appointments   Date Time Provider Department Center   6/22/2021 To Be Determined [facility] TEAM [facility] None   6/23/2021 To  Be Determined [facility] None   6/29/2021 To Be Determined [facility] TEAM [facility] None   6/30/2021 To Be Determined [facility] None   7/6/2021 To Be Determined [facility] TEAM [facility] None   7/7/2021 To Be Determined [facility] None   7/12/2021 To Be Determined [facility] None   7/13/2021 To Be Determined [facility] TEAM [facility] None     SHANKAR Gleason  [address] [address] [phone]    On 6/28/2021  Please go to your hospital follow up appointment at your Primary Care Provider's office on 6/28 at 2:15pm. Your regular Primary Care Provider is unavailable-you'll be seen by Dr. Sharp.      MEDICATIONS ON DISCHARGE     Medication List      START taking these medications      Instructions   nitroglycerin 0.4 MG Subl  Commonly known as: NITROSTAT   Place 1 tablet under the tongue as needed for Chest Pain.  Dose: 0.4 mg        CHANGE how you take these medications      Instructions   nystatin powder  What changed:   · how much to take  · how to take this  · when to take this  · reasons to take this  · additional instructions  Commonly known as: MYCOSTATIN   Apply underneath breasts and on inner thigh area twice a day for fungal infection     warfarin 6 MG Tabs  What changed:   · how much to take  · how to take this  · when to take this  · additional instructions  Commonly known as: COUMADIN   Take 1 tablet by mouth daily or as directed by anticoagulation clinic        CONTINUE taking these medications      Instructions   Abilify 30 MG tablet  Generic drug: aripiprazole   Take 30 mg by mouth every morning.  Dose: 30 mg     acetaminophen 500 MG Tabs  Commonly known as: TYLENOL   Take 1,000 mg by mouth every 6 hours as needed (pain).  Dose: 1,000 mg     albuterol 108 (90 Base) MCG/ACT Aers inhalation aerosol   Inhale 2 Puffs every 6 hours as needed for Shortness of Breath.  Dose: 2 Puff     atorvastatin 20 MG Tabs  Commonly known  as: LIPITOR   Take 1 Tab by mouth every day.  Dose: 20 mg     fluticasone 50 MCG/ACT nasal spray  Commonly known as: FLONASE   Administer 1 Spray into affected nostril(S) 2 times a day.  Dose: 1 Spray     furosemide 20 MG Tabs  Commonly known as: LASIX   Take 1 tablet by mouth 2 times a day.  Dose: 20 mg     Home Care Oxygen   Inhale 2 L/min continuous. Oxygen dose range: 2 L/min  Respiratory route via: Nasal Cannula   Oxygen supplier: [employer]  Dose: 2 L/min     lithium carbonate  MG Tbcr tablet   Take 300-600 mg by mouth 2 Times a Day. 1 caps = 300 mg every morning  2 caps = 600 mg every evening  Dose: 300-600 mg     metoprolol SR 25 MG Tb24  Commonly known as: TOPROL XL   Take 1 tablet by mouth every day.  Dose: 25 mg     montelukast 10 MG Tabs  Commonly known as: SINGULAIR   Take 1 Tab by mouth every bedtime.  Dose: 10 mg     potassium chloride SA 20 MEQ Tbcr  Commonly known as: Kdur   Take 1 tablet by mouth every day.  Dose: 20 mEq     * Spiriva HandiHaler 18 MCG Caps  Generic drug: tiotropium   Inhale 1 Cap by mouth every day.  Dose: 18 mcg     * Spiriva HandiHaler 18 MCG Caps  Generic drug: tiotropium   Place 1 capsule into inhaler and inhale every day.  Dose: 18 mcg         * This list has 2 medication(s) that are the same as other medications prescribed for you. Read the directions carefully, and ask your doctor or other care provider to review them with you.                Allergies  Allergies   Allergen Reactions   • Amoxicillin Rash and Itching     Tolerates cephalosporins, pt reports that she gets a rash all over her body and gets itchy   • Penicillin G Rash and Itching     pt reports that she gets a rash all over her body and gets itchy       DIET    ACTIVITY  As tolerated.  Weight bearing as tolerated    CONSULTATIONS  N/A    PROCEDURES  N/A    LABORATORY  Lab Results   Component Value Date    SODIUM 142 06/17/2021    POTASSIUM 3.5 (L) 06/17/2021    CHLORIDE 104 06/17/2021    CO2 29 06/17/2021     GLUCOSE 184 (H) 06/17/2021    BUN 9 06/17/2021    CREATININE 1.38 06/17/2021    CREATININE 0.9 09/11/2008        Lab Results   Component Value Date    WBC 11.7 (H) 06/17/2021    HEMOGLOBIN 12.1 06/17/2021    HEMATOCRIT 40.1 06/17/2021    PLATELETCT 248 06/17/2021        Total time of the discharge process exceeds 35minutes.

## 2021-06-21 ENCOUNTER — DOCUMENTATION (OUTPATIENT)
Dept: VASCULAR LAB | Facility: MEDICAL CENTER | Age: 46
End: 2021-06-21

## 2021-06-21 DIAGNOSIS — Z79.01 CHRONIC ANTICOAGULATION: Primary | ICD-10-CM

## 2021-06-21 NOTE — PROGRESS NOTES
Pt recently was discharged.     Received staff message from ER pharmD:  INR follow up  Received: 4 days ago  Jeremias Joel, PharmD  Chris Kang, HildaD; Kinjal Gant PharmD  Presented to ER, INR continuing to drop from 6/10 and 6/15. Bolus dose x 1 in ER.       Pt is followed by [facility].  Placed order for [facility] to check INR on 6/22.    Kinjal Gant PharmD

## 2021-06-22 ENCOUNTER — HOME CARE VISIT (OUTPATIENT)
Dept: HOME HEALTH SERVICES | Facility: HOME HEALTHCARE | Age: 46
End: 2021-06-22
Payer: MEDICAID

## 2021-06-22 ENCOUNTER — ANTICOAGULATION MONITORING (OUTPATIENT)
Dept: MEDICAL GROUP | Facility: PHYSICIAN GROUP | Age: 46
End: 2021-06-22

## 2021-06-22 VITALS
RESPIRATION RATE: 20 BRPM | SYSTOLIC BLOOD PRESSURE: 104 MMHG | TEMPERATURE: 97.4 F | HEART RATE: 69 BPM | DIASTOLIC BLOOD PRESSURE: 60 MMHG | OXYGEN SATURATION: 93 %

## 2021-06-22 DIAGNOSIS — I48.0 PAROXYSMAL A-FIB (HCC): ICD-10-CM

## 2021-06-22 LAB
INR PPP: 2.3 (ref 2–3.5)
INR PPP: 2.3 - CRITICAL LOW: < 0.8, CRITICAL HIGH: > 6.5 (ref 2–3.5)

## 2021-06-22 PROCEDURE — G0299 HHS/HOSPICE OF RN EA 15 MIN: HCPCS

## 2021-06-22 PROCEDURE — 665001 SOC-HOME HEALTH

## 2021-06-22 SDOH — ECONOMIC STABILITY: HOUSING INSECURITY: EVIDENCE OF SMOKING MATERIAL: 0

## 2021-06-22 ASSESSMENT — ENCOUNTER SYMPTOMS
DIFFICULTY THINKING: 1
VOMITING: DENIES
MUSCLE WEAKNESS: 1
SEVERE DYSPNEA: 1
NAUSEA: DENIES

## 2021-06-22 ASSESSMENT — ACTIVITIES OF DAILY LIVING (ADL)
CURRENT_FUNCTION: STAND BY ASSIST
AMBULATION ASSISTANCE: STAND BY ASSIST

## 2021-06-22 NOTE — PROGRESS NOTES
Attempted to contact pt regarding therapeutic INR  However, unable to lvm as mb is full  Will attempt again later    Kinjal Gant, HildaD

## 2021-06-22 NOTE — PROGRESS NOTES
Anticoagulation Summary  As of 2021    INR goal:  2.0-3.0   TTR:  23.0 % (2.2 mo)   INR used for dosin.30 (2021)   Warfarin maintenance plan:  9 mg (6 mg x 1.5) every Sun; 6 mg (6 mg x 1) all other days   Weekly warfarin total:  45 mg   Plan last modified:  Kinjal Gant PharmD (6/10/2021)   Next INR check:  2021   Target end date:  Indefinite    Indications    Paroxysmal A-fib (HCC) [I48.0]             Anticoagulation Episode Summary     INR check location:      Preferred lab:      Send INR reminders to:      Comments:  [facility]         Anticoagulation Patient Findings      Spoke with patient to report a therapeutic INR.      Pt denies any s/s of bleeding, bruising, clotting or any changes to diet or medication.      Pt instructed to continue with current warfarin dosing regimen, confirms dosing.   Will follow up in 1 week(s) via [facility].     Kinjal Gant PharmD

## 2021-06-23 ENCOUNTER — HOME CARE VISIT (OUTPATIENT)
Dept: HOME HEALTH SERVICES | Facility: HOME HEALTHCARE | Age: 46
End: 2021-06-23
Payer: MEDICAID

## 2021-06-23 ENCOUNTER — APPOINTMENT (OUTPATIENT)
Dept: RADIOLOGY | Facility: MEDICAL CENTER | Age: 46
End: 2021-06-23
Attending: EMERGENCY MEDICINE
Payer: MEDICAID

## 2021-06-23 ENCOUNTER — HOSPITAL ENCOUNTER (EMERGENCY)
Facility: MEDICAL CENTER | Age: 46
End: 2021-06-23
Attending: EMERGENCY MEDICINE
Payer: MEDICAID

## 2021-06-23 ENCOUNTER — PHARMACY VISIT (OUTPATIENT)
Dept: PHARMACY | Facility: MEDICAL CENTER | Age: 46
End: 2021-06-23
Payer: COMMERCIAL

## 2021-06-23 VITALS
SYSTOLIC BLOOD PRESSURE: 103 MMHG | HEIGHT: 61 IN | WEIGHT: 286.6 LBS | OXYGEN SATURATION: 100 % | RESPIRATION RATE: 18 BRPM | BODY MASS INDEX: 54.11 KG/M2 | TEMPERATURE: 97 F | HEART RATE: 80 BPM | DIASTOLIC BLOOD PRESSURE: 50 MMHG

## 2021-06-23 DIAGNOSIS — J02.9 PHARYNGITIS, UNSPECIFIED ETIOLOGY: ICD-10-CM

## 2021-06-23 DIAGNOSIS — R06.02 SHORTNESS OF BREATH: ICD-10-CM

## 2021-06-23 LAB
ALBUMIN SERPL BCP-MCNC: 3.5 G/DL (ref 3.2–4.9)
ALBUMIN/GLOB SERPL: 1.2 G/DL
ALP SERPL-CCNC: 105 U/L (ref 30–99)
ALT SERPL-CCNC: 16 U/L (ref 2–50)
ANION GAP SERPL CALC-SCNC: 5 MMOL/L (ref 7–16)
ANISOCYTOSIS BLD QL SMEAR: ABNORMAL
AST SERPL-CCNC: 12 U/L (ref 12–45)
BASOPHILS # BLD AUTO: 0 % (ref 0–1.8)
BASOPHILS # BLD: 0 K/UL (ref 0–0.12)
BILIRUB SERPL-MCNC: 0.5 MG/DL (ref 0.1–1.5)
BUN SERPL-MCNC: 10 MG/DL (ref 8–22)
CALCIUM SERPL-MCNC: 9.1 MG/DL (ref 8.5–10.5)
CHLORIDE SERPL-SCNC: 108 MMOL/L (ref 96–112)
CO2 SERPL-SCNC: 31 MMOL/L (ref 20–33)
CREAT SERPL-MCNC: 0.85 MG/DL (ref 0.5–1.4)
EKG IMPRESSION: NORMAL
EOSINOPHIL # BLD AUTO: 0 K/UL (ref 0–0.51)
EOSINOPHIL NFR BLD: 0 % (ref 0–6.9)
ERYTHROCYTE [DISTWIDTH] IN BLOOD BY AUTOMATED COUNT: 59.9 FL (ref 35.9–50)
FLUAV RNA SPEC QL NAA+PROBE: NEGATIVE
FLUBV RNA SPEC QL NAA+PROBE: NEGATIVE
GLOBULIN SER CALC-MCNC: 3 G/DL (ref 1.9–3.5)
GLUCOSE SERPL-MCNC: 108 MG/DL (ref 65–99)
HCT VFR BLD AUTO: 40.8 % (ref 37–47)
HGB BLD-MCNC: 11.8 G/DL (ref 12–16)
LYMPHOCYTES # BLD AUTO: 1.08 K/UL (ref 1–4.8)
LYMPHOCYTES NFR BLD: 10.5 % (ref 22–41)
MANUAL DIFF BLD: NORMAL
MCH RBC QN AUTO: 27.8 PG (ref 27–33)
MCHC RBC AUTO-ENTMCNC: 28.9 G/DL (ref 33.6–35)
MCV RBC AUTO: 96.2 FL (ref 81.4–97.8)
MONOCYTES # BLD AUTO: 0.64 K/UL (ref 0–0.85)
MONOCYTES NFR BLD AUTO: 6.2 % (ref 0–13.4)
MORPHOLOGY BLD-IMP: NORMAL
NEUTROPHILS # BLD AUTO: 8.58 K/UL (ref 2–7.15)
NEUTROPHILS NFR BLD: 83.3 % (ref 44–72)
NRBC # BLD AUTO: 0 K/UL
NRBC BLD-RTO: 0 /100 WBC
PLATELET # BLD AUTO: 237 K/UL (ref 164–446)
PLATELET BLD QL SMEAR: NORMAL
PMV BLD AUTO: 11.2 FL (ref 9–12.9)
POTASSIUM SERPL-SCNC: 4.3 MMOL/L (ref 3.6–5.5)
PROT SERPL-MCNC: 6.5 G/DL (ref 6–8.2)
RBC # BLD AUTO: 4.24 M/UL (ref 4.2–5.4)
RBC BLD AUTO: PRESENT
RSV RNA SPEC QL NAA+PROBE: NEGATIVE
SARS-COV-2 RNA RESP QL NAA+PROBE: NOTDETECTED
SODIUM SERPL-SCNC: 144 MMOL/L (ref 135–145)
SPECIMEN SOURCE: NORMAL
TROPONIN T SERPL-MCNC: 23 NG/L (ref 6–19)
WBC # BLD AUTO: 10.3 K/UL (ref 4.8–10.8)

## 2021-06-23 PROCEDURE — 93005 ELECTROCARDIOGRAM TRACING: CPT | Performed by: EMERGENCY MEDICINE

## 2021-06-23 PROCEDURE — C9803 HOPD COVID-19 SPEC COLLECT: HCPCS | Performed by: EMERGENCY MEDICINE

## 2021-06-23 PROCEDURE — RXMED WILLOW AMBULATORY MEDICATION CHARGE: Performed by: EMERGENCY MEDICINE

## 2021-06-23 PROCEDURE — 71045 X-RAY EXAM CHEST 1 VIEW: CPT

## 2021-06-23 PROCEDURE — 80053 COMPREHEN METABOLIC PANEL: CPT

## 2021-06-23 PROCEDURE — 99283 EMERGENCY DEPT VISIT LOW MDM: CPT

## 2021-06-23 PROCEDURE — 87077 CULTURE AEROBIC IDENTIFY: CPT

## 2021-06-23 PROCEDURE — 0241U HCHG SARS-COV-2 COVID-19 NFCT DS RESP RNA 4 TRGT MIC: CPT

## 2021-06-23 PROCEDURE — 85007 BL SMEAR W/DIFF WBC COUNT: CPT

## 2021-06-23 PROCEDURE — 36415 COLL VENOUS BLD VENIPUNCTURE: CPT

## 2021-06-23 PROCEDURE — 84484 ASSAY OF TROPONIN QUANT: CPT

## 2021-06-23 PROCEDURE — 87081 CULTURE SCREEN ONLY: CPT

## 2021-06-23 PROCEDURE — 85027 COMPLETE CBC AUTOMATED: CPT

## 2021-06-23 RX ORDER — METHYLPREDNISOLONE 4 MG/1
TABLET ORAL
Qty: 21 TABLET | Refills: 0 | Status: ON HOLD | OUTPATIENT
Start: 2021-06-23 | End: 2021-06-27

## 2021-06-23 ASSESSMENT — LIFESTYLE VARIABLES: DO YOU DRINK ALCOHOL: NO

## 2021-06-23 ASSESSMENT — FIBROSIS 4 INDEX: FIB4 SCORE: 0.62

## 2021-06-23 NOTE — DISCHARGE INSTRUCTIONS
Strep test is negative.  Your oxygen levels are adequate.    You have been provided a prescription for a steroid medication to decrease inflammation and discomfort, and this can also with shortness of breath.  Please follow-up with your primary care doctor.    Return if your symptoms change or worsen

## 2021-06-23 NOTE — ED TRIAGE NOTES
"Chief Complaint   Patient presents with   • Sore Throat     x3 days. hx of COPD. wears 2L O2 24-7.      BIB EMS to triage for above. NAD noted. VSS.     /56   Pulse 74   Temp 36.1 °C (97 °F) (Temporal)   Resp 16   Ht 1.549 m (5' 1\")   Wt (!) 130 kg (286 lb 9.6 oz)   LMP  (LMP Unknown)   SpO2 99%   BMI 54.15 kg/m²     "

## 2021-06-23 NOTE — ED PROVIDER NOTES
ED Provider  Scribed for Troy Randolph D.O. by Barry Scott. 2021  12:40 PM    Means of arrival:Ambulance  History obtained from:Patient  History limited by: None    CHIEF COMPLAINT  Chief Complaint   Patient presents with    Sore Throat     x3 days. hx of COPD. wears 2L O2 24-7.      HPI  Lorene Haro is a 45 y.o. female who presents by ambulance for evaluation of sore throat onset 3 days ago. She admits to associated symptoms of cough (3 days ago), yellow sputum production, generalized malaise, and mild chest pain, but denies nausea, vomiting or acute lower extremity edema. No alleviating factors were reported. She has history of COPD and uses 2 L supplementary home oxygen. She has been compliant with prescribed COPD medications. She has received both of her COVID vaccines. She takes a diuretic for chronic lower extremity edema.     REVIEW OF SYSTEMS  See HPI for further details. All other systems negative.      PAST MEDICAL HISTORY   has a past medical history of Asthma, Bipolar 2 disorder ([facility]), Chickenpox, Chronic obstructive pulmonary disease (HCC), Hypertension, Other psychological stress, Schizophrenic disorder ([facility]), and Yeast infection of the skin (2021).    SOCIAL HISTORY  Social History     Tobacco Use    Smoking status: Former Smoker     Packs/day: 1.00     Years: 20.00     Pack years: 20.00     Types: Cigarettes     Quit date: 2020     Years since quittin.4    Smokeless tobacco: Never Used    Tobacco comment: Patient states she quit two days ago    Vaping Use    Vaping Use: Never used   Substance and Sexual Activity    Alcohol use: No    Drug use: No     SURGICAL HISTORY   has a past surgical history that includes hysterectomy laparoscopy; colectomy; cholecystectomy; and knee arthroscopy (Left).    CURRENT MEDICATIONS  Home Medications       Reviewed by Louie Pappas R.N. (Registered Nurse) on 21 at 1126  Med List Status: Partial     Medication Last Dose Status  "  acetaminophen (TYLENOL) 500 MG Tab  Active   albuterol 108 (90 Base) MCG/ACT Aero Soln inhalation aerosol  Active   aripiprazole (ABILIFY) 30 MG tablet  Active   atorvastatin (LIPITOR) 20 MG Tab  Active   fluticasone (FLONASE) 50 MCG/ACT nasal spray  Active   furosemide (LASIX) 20 MG Tab  Active   Home Care Oxygen  Active   lithium CR (LITHOBID) 300 MG Tab CR  Active   metoprolol SR (TOPROL XL) 25 MG TABLET SR 24 HR  Active   montelukast (SINGULAIR) 10 MG Tab  Active   nitroglycerin (NITROSTAT) 0.4 MG SL Tab  Active   nystatin (MYCOSTATIN) powder  Active   potassium chloride SA (KDUR) 20 MEQ Tab CR  Active   tiotropium (SPIRIVA HANDIHALER) 18 MCG Cap  Active   tiotropium (SPIRIVA HANDIHALER) 18 MCG Cap  Active   warfarin (COUMADIN) 6 MG Tab  Active                    ALLERGIES  Allergies   Allergen Reactions    Amoxicillin Rash and Itching     Tolerates cephalosporins, pt reports that she gets a rash all over her body and gets itchy    Penicillin G Rash and Itching     pt reports that she gets a rash all over her body and gets itchy       PHYSICAL EXAM  VITAL SIGNS: /56   Pulse 74   Temp 36.1 °C (97 °F) (Temporal)   Resp 16   Ht 1.549 m (5' 1\")   Wt (!) 130 kg (286 lb 9.6 oz)   LMP  (LMP Unknown)   SpO2 99%   BMI 54.15 kg/m²   Constitutional: Alert in no apparent distress. BMI greater than 30.  HENT: No signs of trauma, mucous membranes are moist  Eyes: Conjunctiva normal, Non-icteric.   Neck: Normal range of motion, No tenderness, Supple.  Lymphatic: No lymphadenopathy noted.   Cardiovascular: Regular rate and rhythm, no murmurs.   Thorax & Lungs: Lung sounds diminished. No respiratory distress, No wheezing, No chest tenderness.   Abdomen: Bowel sounds normal, Soft, No tenderness, No masses, No pulsatile masses. No peritoneal signs.  Skin: Warm, Dry, normal color.   Back: No bony tenderness, No CVA tenderness.   Extremities: Lower extremities with +1 bilateral edema. No tenderness, No " cyanosis  Musculoskeletal: Good range of motion in all major joints. No tenderness to palpation or major deformities noted.   Neurologic: Alert and oriented x4, Normal motor function, Normal sensory function, No focal deficits noted.   Psychiatric: Affect normal, Judgment normal, Mood normal.     DIAGNOSTIC STUDIES / PROCEDURES    EK Lead EKG interpreted by me as below     LABS  Results for orders placed or performed during the hospital encounter of 21   CBC WITH DIFFERENTIAL   Result Value Ref Range    WBC 10.3 4.8 - 10.8 K/uL    RBC 4.24 4.20 - 5.40 M/uL    Hemoglobin 11.8 (L) 12.0 - 16.0 g/dL    Hematocrit 40.8 37.0 - 47.0 %    MCV 96.2 81.4 - 97.8 fL    MCH 27.8 27.0 - 33.0 pg    MCHC 28.9 (L) 33.6 - 35.0 g/dL    RDW 59.9 (H) 35.9 - 50.0 fL    Platelet Count 237 164 - 446 K/uL    MPV 11.2 9.0 - 12.9 fL    Neutrophils-Polys 83.30 (H) 44.00 - 72.00 %    Lymphocytes 10.50 (L) 22.00 - 41.00 %    Monocytes 6.20 0.00 - 13.40 %    Eosinophils 0.00 0.00 - 6.90 %    Basophils 0.00 0.00 - 1.80 %    Nucleated RBC 0.00 /100 WBC    Neutrophils (Absolute) 8.58 (H) 2.00 - 7.15 K/uL    Lymphs (Absolute) 1.08 1.00 - 4.80 K/uL    Monos (Absolute) 0.64 0.00 - 0.85 K/uL    Eos (Absolute) 0.00 0.00 - 0.51 K/uL    Baso (Absolute) 0.00 0.00 - 0.12 K/uL    NRBC (Absolute) 0.00 K/uL    Anisocytosis 1+    COMP METABOLIC PANEL   Result Value Ref Range    Sodium 144 135 - 145 mmol/L    Potassium 4.3 3.6 - 5.5 mmol/L    Chloride 108 96 - 112 mmol/L    Co2 31 20 - 33 mmol/L    Anion Gap 5.0 (L) 7.0 - 16.0    Glucose 108 (H) 65 - 99 mg/dL    Bun 10 8 - 22 mg/dL    Creatinine 0.85 0.50 - 1.40 mg/dL    Calcium 9.1 8.5 - 10.5 mg/dL    AST(SGOT) 12 12 - 45 U/L    ALT(SGPT) 16 2 - 50 U/L    Alkaline Phosphatase 105 (H) 30 - 99 U/L    Total Bilirubin 0.5 0.1 - 1.5 mg/dL    Albumin 3.5 3.2 - 4.9 g/dL    Total Protein 6.5 6.0 - 8.2 g/dL    Globulin 3.0 1.9 - 3.5 g/dL    A-G Ratio 1.2 g/dL   TROPONIN   Result Value Ref Range    Troponin T  23 (H) 6 - 19 ng/L   COV-2, FLU A/B, AND RSV BY PCR (2-4 HOURS CEPHEID): Collect NP swab in VTM    Specimen: Respirate   Result Value Ref Range    Influenza virus A RNA Negative Negative    Influenza virus B, PCR Negative Negative    RSV, PCR Negative Negative    SARS-CoV-2 by PCR NotDetected     SARS-CoV-2 Source NP Swab    PERIPHERAL SMEAR REVIEW   Result Value Ref Range    Peripheral Smear Review see below    PLATELET ESTIMATE   Result Value Ref Range    Plt Estimation Normal    MORPHOLOGY   Result Value Ref Range    RBC Morphology Present    DIFFERENTIAL MANUAL   Result Value Ref Range    Manual Diff Status PERFORMED    ESTIMATED GFR   Result Value Ref Range    GFR If African American >60 >60 mL/min/1.73 m 2    GFR If Non African American >60 >60 mL/min/1.73 m 2   EKG (NOW)   Result Value Ref Range    Report       [facility] Emergency Dept[facility]    Test Date:  2021  Pt Name:    ADELINA MILLAN                Department: ER  MRN:        [id number]                      Room:       [facility]  Gender:     Female                       Technician: [id number]  :        [id number]                   Requested By:DAPHNE MEDINA  Order #:    [id number]                    Reading MD: DAPHNE MEDINA D.O.    Measurements  Intervals                                Axis  Rate:       71                           P:          67  RI:         132                          QRS:        56  QRSD:       104                          T:          5  QT:         388  QTc:        422    Interpretive Statements  SINUS RHYTHM  Compared to ECG 2021 10:58:20    T-wave abnormality no longer present  Electronically Signed On 2021 14:58:08 PDT by DAPHNE MEDINA D.O.         All labs reviewed by me.    RADIOLOGY  DX-CHEST-PORTABLE (1 VIEW)   Final Result      No acute cardiopulmonary findings.        The radiologist's interpretations of all radiological studies have been reviewed by me.    Films have been independently  by me      COURSE  Pertinent Labs & Imaging studies reviewed. (See chart for details)    Review of past medical records shows the patient was admitted on 6/17/21 for chest pain. Previous stress test and echocardiogram were normal.     12:40 PM - Patient seen and examined at bedside. Discussed plan of care, including imaging and labs. Patient agrees to the plan of care. Ordered for DX-Chest, EKG, CoV-2, Flu A/B and RSV by PCR, CBC with diff, CMP, Troponin, Group A Strep PCR to evaluate her symptoms.     2:33 PM - Patient's troponin is 23, which is in line with her baseline troponin. I do not suspect cardiac injury.     2:59 PM - I reevaluated the patient at bedside. I discussed the patient's diagnostic study results as noted above. I discussed plan for discharge and follow up as outlined below. The patient will be prescribed Medrol Dosepak. The patient verbalizes they feel comfortable going home. The patient is stable for discharge at this time and will return for any new or worsening symptoms. Patient verbalizes understanding and support with my plan for discharge.      MEDICAL DECISION MAKING  This is a 45 y.o. female who presents with overall not feeling well and complains of sore throat. Her pharynx is normal, she has no stridor and her strep test is negative. She does have a history of COPD she is complaining of cough and a chest x-ray shows no acute disease. She has symptoms consistent with pharyngitis, and possible exacerbation of COPD steroid medications will be initiated here. Her cardiac evaluation shows a troponin of 23. Old records were reviewed and shows this is consistent with her normal troponin level. She is a stable for discharge home with symptomatic treatment    The patient will return for new or worsening symptoms and is stable at the time of discharge.    DISPOSITION:  Patient will be discharged home in stable condition.    FOLLOW UP:  SHANKAR Gleason  [address] [place] [address]  [id number]  [phone]        OUTPATIENT MEDICATIONS:  Discharge Medication List as of 6/23/2021  3:04 PM        START taking these medications    Details   methylPREDNISolone (MEDROL DOSEPAK) 4 MG Tablet Therapy Pack Use as directed, Disp-1 Each, R-0, Normal            FINAL IMPRESSION  1. Pharyngitis, unspecified etiology    2. Shortness of breath       IBarry (Kevonibe), am scribing for, and in the presence of, Troy Randolph D.O..    Electronically signed by: Barry Scott (Scribe), 6/23/2021    I, Troy Randolph D.O. personally performed the services described in this documentation, as scribed by Barry Scott in my presence, and it is both accurate and complete.    The note accurately reflects work and decisions made by me.  Troy Randolph D.O.  6/23/2021  7:49 PM

## 2021-06-25 LAB
S PYO SPEC QL CULT: NORMAL
SIGNIFICANT IND 70042: NORMAL
SITE SITE: NORMAL
SOURCE SOURCE: NORMAL

## 2021-06-26 ENCOUNTER — APPOINTMENT (OUTPATIENT)
Dept: RADIOLOGY | Facility: MEDICAL CENTER | Age: 46
End: 2021-06-26
Attending: EMERGENCY MEDICINE
Payer: MEDICAID

## 2021-06-26 ENCOUNTER — HOSPITAL ENCOUNTER (OUTPATIENT)
Facility: MEDICAL CENTER | Age: 46
End: 2021-06-27
Attending: EMERGENCY MEDICINE | Admitting: STUDENT IN AN ORGANIZED HEALTH CARE EDUCATION/TRAINING PROGRAM
Payer: MEDICAID

## 2021-06-26 DIAGNOSIS — R53.1 GENERALIZED WEAKNESS: ICD-10-CM

## 2021-06-26 DIAGNOSIS — R07.9 CHEST PAIN, UNSPECIFIED TYPE: ICD-10-CM

## 2021-06-26 DIAGNOSIS — L03.119 CELLULITIS OF LOWER EXTREMITY, UNSPECIFIED LATERALITY: ICD-10-CM

## 2021-06-26 LAB
ALBUMIN SERPL BCP-MCNC: 4.1 G/DL (ref 3.2–4.9)
ALBUMIN/GLOB SERPL: 1.4 G/DL
ALP SERPL-CCNC: 110 U/L (ref 30–99)
ALT SERPL-CCNC: 29 U/L (ref 2–50)
ANION GAP SERPL CALC-SCNC: 10 MMOL/L (ref 7–16)
APPEARANCE UR: CLEAR
AST SERPL-CCNC: 19 U/L (ref 12–45)
BACTERIA #/AREA URNS HPF: NEGATIVE /HPF
BASOPHILS # BLD AUTO: 0.1 % (ref 0–1.8)
BASOPHILS # BLD: 0.02 K/UL (ref 0–0.12)
BILIRUB SERPL-MCNC: 0.4 MG/DL (ref 0.1–1.5)
BILIRUB UR QL STRIP.AUTO: NEGATIVE
BUN SERPL-MCNC: 12 MG/DL (ref 8–22)
CALCIUM SERPL-MCNC: 9.2 MG/DL (ref 8.5–10.5)
CHLORIDE SERPL-SCNC: 105 MMOL/L (ref 96–112)
CO2 SERPL-SCNC: 29 MMOL/L (ref 20–33)
COLOR UR: YELLOW
CREAT SERPL-MCNC: 1.03 MG/DL (ref 0.5–1.4)
EOSINOPHIL # BLD AUTO: 0.03 K/UL (ref 0–0.51)
EOSINOPHIL NFR BLD: 0.2 % (ref 0–6.9)
EPI CELLS #/AREA URNS HPF: NEGATIVE /HPF
ERYTHROCYTE [DISTWIDTH] IN BLOOD BY AUTOMATED COUNT: 57 FL (ref 35.9–50)
GLOBULIN SER CALC-MCNC: 3 G/DL (ref 1.9–3.5)
GLUCOSE SERPL-MCNC: 171 MG/DL (ref 65–99)
GLUCOSE UR STRIP.AUTO-MCNC: NEGATIVE MG/DL
HCT VFR BLD AUTO: 41 % (ref 37–47)
HGB BLD-MCNC: 11.9 G/DL (ref 12–16)
HYALINE CASTS #/AREA URNS LPF: NORMAL /LPF
IMM GRANULOCYTES # BLD AUTO: 0.1 K/UL (ref 0–0.11)
IMM GRANULOCYTES NFR BLD AUTO: 0.7 % (ref 0–0.9)
INR PPP: 1.47 (ref 0.87–1.13)
KETONES UR STRIP.AUTO-MCNC: NEGATIVE MG/DL
LEUKOCYTE ESTERASE UR QL STRIP.AUTO: ABNORMAL
LYMPHOCYTES # BLD AUTO: 1.07 K/UL (ref 1–4.8)
LYMPHOCYTES NFR BLD: 7.7 % (ref 22–41)
MAGNESIUM SERPL-MCNC: 2.2 MG/DL (ref 1.5–2.5)
MCH RBC QN AUTO: 27.4 PG (ref 27–33)
MCHC RBC AUTO-ENTMCNC: 29 G/DL (ref 33.6–35)
MCV RBC AUTO: 94.5 FL (ref 81.4–97.8)
MICRO URNS: ABNORMAL
MONOCYTES # BLD AUTO: 0.46 K/UL (ref 0–0.85)
MONOCYTES NFR BLD AUTO: 3.3 % (ref 0–13.4)
NEUTROPHILS # BLD AUTO: 12.3 K/UL (ref 2–7.15)
NEUTROPHILS NFR BLD: 88 % (ref 44–72)
NITRITE UR QL STRIP.AUTO: NEGATIVE
NRBC # BLD AUTO: 0 K/UL
NRBC BLD-RTO: 0 /100 WBC
NT-PROBNP SERPL IA-MCNC: 451 PG/ML (ref 0–125)
PH UR STRIP.AUTO: 6 [PH] (ref 5–8)
PLATELET # BLD AUTO: 277 K/UL (ref 164–446)
PMV BLD AUTO: 11.2 FL (ref 9–12.9)
POTASSIUM SERPL-SCNC: 3.9 MMOL/L (ref 3.6–5.5)
PROT SERPL-MCNC: 7.1 G/DL (ref 6–8.2)
PROT UR QL STRIP: NEGATIVE MG/DL
PROTHROMBIN TIME: 17.4 SEC (ref 12–14.6)
RBC # BLD AUTO: 4.34 M/UL (ref 4.2–5.4)
RBC # URNS HPF: NORMAL /HPF
RBC UR QL AUTO: NEGATIVE
SODIUM SERPL-SCNC: 144 MMOL/L (ref 135–145)
SP GR UR STRIP.AUTO: 1.01
TROPONIN T SERPL-MCNC: 18 NG/L (ref 6–19)
UROBILINOGEN UR STRIP.AUTO-MCNC: 0.2 MG/DL
WBC # BLD AUTO: 14 K/UL (ref 4.8–10.8)
WBC #/AREA URNS HPF: NORMAL /HPF

## 2021-06-26 PROCEDURE — 700111 HCHG RX REV CODE 636 W/ 250 OVERRIDE (IP): Performed by: EMERGENCY MEDICINE

## 2021-06-26 PROCEDURE — 83880 ASSAY OF NATRIURETIC PEPTIDE: CPT

## 2021-06-26 PROCEDURE — 71045 X-RAY EXAM CHEST 1 VIEW: CPT

## 2021-06-26 PROCEDURE — 85025 COMPLETE CBC W/AUTO DIFF WBC: CPT

## 2021-06-26 PROCEDURE — 99219 PR INITIAL OBSERVATION CARE,LEVL II: CPT | Performed by: STUDENT IN AN ORGANIZED HEALTH CARE EDUCATION/TRAINING PROGRAM

## 2021-06-26 PROCEDURE — 96365 THER/PROPH/DIAG IV INF INIT: CPT

## 2021-06-26 PROCEDURE — 81001 URINALYSIS AUTO W/SCOPE: CPT | Mod: XU

## 2021-06-26 PROCEDURE — 83735 ASSAY OF MAGNESIUM: CPT

## 2021-06-26 PROCEDURE — 84484 ASSAY OF TROPONIN QUANT: CPT

## 2021-06-26 PROCEDURE — G0378 HOSPITAL OBSERVATION PER HR: HCPCS

## 2021-06-26 PROCEDURE — 99285 EMERGENCY DEPT VISIT HI MDM: CPT

## 2021-06-26 PROCEDURE — 99999 PR NO CHARGE: CPT | Performed by: STUDENT IN AN ORGANIZED HEALTH CARE EDUCATION/TRAINING PROGRAM

## 2021-06-26 PROCEDURE — 80307 DRUG TEST PRSMV CHEM ANLYZR: CPT

## 2021-06-26 PROCEDURE — 85610 PROTHROMBIN TIME: CPT

## 2021-06-26 PROCEDURE — 84145 PROCALCITONIN (PCT): CPT

## 2021-06-26 PROCEDURE — 80053 COMPREHEN METABOLIC PANEL: CPT

## 2021-06-26 PROCEDURE — 93005 ELECTROCARDIOGRAM TRACING: CPT | Performed by: STUDENT IN AN ORGANIZED HEALTH CARE EDUCATION/TRAINING PROGRAM

## 2021-06-26 RX ORDER — ALBUTEROL SULFATE 90 UG/1
2 AEROSOL, METERED RESPIRATORY (INHALATION) EVERY 6 HOURS PRN
Status: DISCONTINUED | OUTPATIENT
Start: 2021-06-26 | End: 2021-06-27 | Stop reason: HOSPADM

## 2021-06-26 RX ORDER — LABETALOL HYDROCHLORIDE 5 MG/ML
10 INJECTION, SOLUTION INTRAVENOUS EVERY 4 HOURS PRN
Status: DISCONTINUED | OUTPATIENT
Start: 2021-06-26 | End: 2021-06-27 | Stop reason: HOSPADM

## 2021-06-26 RX ORDER — CLONIDINE HYDROCHLORIDE 0.1 MG/1
0.1 TABLET ORAL EVERY 6 HOURS PRN
Status: DISCONTINUED | OUTPATIENT
Start: 2021-06-26 | End: 2021-06-27 | Stop reason: HOSPADM

## 2021-06-26 RX ORDER — CEFAZOLIN SODIUM 1 G/50ML
1 INJECTION, SOLUTION INTRAVENOUS ONCE
Status: COMPLETED | OUTPATIENT
Start: 2021-06-26 | End: 2021-06-27

## 2021-06-26 RX ORDER — ENALAPRILAT 1.25 MG/ML
1.25 INJECTION INTRAVENOUS EVERY 6 HOURS PRN
Status: DISCONTINUED | OUTPATIENT
Start: 2021-06-26 | End: 2021-06-27 | Stop reason: HOSPADM

## 2021-06-26 RX ORDER — FLUTICASONE PROPIONATE 50 MCG
1 SPRAY, SUSPENSION (ML) NASAL 2 TIMES DAILY
Status: DISCONTINUED | OUTPATIENT
Start: 2021-06-26 | End: 2021-06-27 | Stop reason: HOSPADM

## 2021-06-26 RX ORDER — PROMETHAZINE HYDROCHLORIDE 25 MG/1
12.5-25 TABLET ORAL EVERY 4 HOURS PRN
Status: DISCONTINUED | OUTPATIENT
Start: 2021-06-26 | End: 2021-06-27 | Stop reason: HOSPADM

## 2021-06-26 RX ORDER — POLYETHYLENE GLYCOL 3350 17 G/17G
1 POWDER, FOR SOLUTION ORAL
Status: DISCONTINUED | OUTPATIENT
Start: 2021-06-26 | End: 2021-06-27 | Stop reason: HOSPADM

## 2021-06-26 RX ORDER — ARIPIPRAZOLE 15 MG/1
30 TABLET ORAL EVERY MORNING
Status: DISCONTINUED | OUTPATIENT
Start: 2021-06-27 | End: 2021-06-27 | Stop reason: HOSPADM

## 2021-06-26 RX ORDER — AMOXICILLIN 250 MG
2 CAPSULE ORAL 2 TIMES DAILY
Status: DISCONTINUED | OUTPATIENT
Start: 2021-06-26 | End: 2021-06-27 | Stop reason: HOSPADM

## 2021-06-26 RX ORDER — REGADENOSON 0.08 MG/ML
0.4 INJECTION, SOLUTION INTRAVENOUS
Status: DISCONTINUED | OUTPATIENT
Start: 2021-06-26 | End: 2021-06-27 | Stop reason: HOSPADM

## 2021-06-26 RX ORDER — MONTELUKAST SODIUM 10 MG/1
10 TABLET ORAL
Status: DISCONTINUED | OUTPATIENT
Start: 2021-06-26 | End: 2021-06-27 | Stop reason: HOSPADM

## 2021-06-26 RX ORDER — FUROSEMIDE 40 MG/1
20 TABLET ORAL 2 TIMES DAILY
Status: DISCONTINUED | OUTPATIENT
Start: 2021-06-26 | End: 2021-06-27 | Stop reason: HOSPADM

## 2021-06-26 RX ORDER — METOPROLOL SUCCINATE 25 MG/1
25 TABLET, EXTENDED RELEASE ORAL DAILY
Status: DISCONTINUED | OUTPATIENT
Start: 2021-06-27 | End: 2021-06-27 | Stop reason: HOSPADM

## 2021-06-26 RX ORDER — ONDANSETRON 4 MG/1
4 TABLET, ORALLY DISINTEGRATING ORAL EVERY 4 HOURS PRN
Status: DISCONTINUED | OUTPATIENT
Start: 2021-06-26 | End: 2021-06-27 | Stop reason: HOSPADM

## 2021-06-26 RX ORDER — PROCHLORPERAZINE EDISYLATE 5 MG/ML
5-10 INJECTION INTRAMUSCULAR; INTRAVENOUS EVERY 4 HOURS PRN
Status: DISCONTINUED | OUTPATIENT
Start: 2021-06-26 | End: 2021-06-27 | Stop reason: HOSPADM

## 2021-06-26 RX ORDER — WARFARIN SODIUM 6 MG/1
6-9 TABLET ORAL DAILY
Status: SHIPPED | COMMUNITY
End: 2021-08-30 | Stop reason: SDUPTHER

## 2021-06-26 RX ORDER — BISACODYL 10 MG
10 SUPPOSITORY, RECTAL RECTAL
Status: DISCONTINUED | OUTPATIENT
Start: 2021-06-26 | End: 2021-06-27 | Stop reason: HOSPADM

## 2021-06-26 RX ORDER — NITROGLYCERIN 0.4 MG/1
0.4 TABLET SUBLINGUAL PRN
Status: DISCONTINUED | OUTPATIENT
Start: 2021-06-26 | End: 2021-06-27 | Stop reason: HOSPADM

## 2021-06-26 RX ORDER — WARFARIN SODIUM 6 MG/1
12 TABLET ORAL ONCE
Status: COMPLETED | OUTPATIENT
Start: 2021-06-27 | End: 2021-06-27

## 2021-06-26 RX ORDER — ACETAMINOPHEN 325 MG/1
650 TABLET ORAL EVERY 6 HOURS PRN
Status: DISCONTINUED | OUTPATIENT
Start: 2021-06-26 | End: 2021-06-27 | Stop reason: HOSPADM

## 2021-06-26 RX ORDER — ONDANSETRON 2 MG/ML
4 INJECTION INTRAMUSCULAR; INTRAVENOUS EVERY 4 HOURS PRN
Status: DISCONTINUED | OUTPATIENT
Start: 2021-06-26 | End: 2021-06-27 | Stop reason: HOSPADM

## 2021-06-26 RX ORDER — LITHIUM CARBONATE 300 MG/1
600 TABLET, FILM COATED, EXTENDED RELEASE ORAL EVERY EVENING
Status: DISCONTINUED | OUTPATIENT
Start: 2021-06-26 | End: 2021-06-27 | Stop reason: HOSPADM

## 2021-06-26 RX ORDER — PROMETHAZINE HYDROCHLORIDE 25 MG/1
12.5-25 SUPPOSITORY RECTAL EVERY 4 HOURS PRN
Status: DISCONTINUED | OUTPATIENT
Start: 2021-06-26 | End: 2021-06-27 | Stop reason: HOSPADM

## 2021-06-26 RX ORDER — LITHIUM CARBONATE 300 MG/1
300 TABLET, FILM COATED, EXTENDED RELEASE ORAL EVERY MORNING
Status: DISCONTINUED | OUTPATIENT
Start: 2021-06-27 | End: 2021-06-27 | Stop reason: HOSPADM

## 2021-06-26 RX ORDER — AMINOPHYLLINE 25 MG/ML
100 INJECTION, SOLUTION INTRAVENOUS
Status: DISCONTINUED | OUTPATIENT
Start: 2021-06-26 | End: 2021-06-27 | Stop reason: HOSPADM

## 2021-06-26 RX ORDER — ATORVASTATIN CALCIUM 80 MG/1
80 TABLET, FILM COATED ORAL DAILY
Status: DISCONTINUED | OUTPATIENT
Start: 2021-06-27 | End: 2021-06-27 | Stop reason: HOSPADM

## 2021-06-26 RX ADMIN — CEFAZOLIN SODIUM 1 G: 1 INJECTION, SOLUTION INTRAVENOUS at 23:46

## 2021-06-26 ASSESSMENT — CHA2DS2 SCORE
HYPERTENSION: NO
CHF OR LEFT VENTRICULAR DYSFUNCTION: NO
AGE 65 TO 74: NO
AGE 75 OR GREATER: NO
VASCULAR DISEASE: NO
DIABETES: NO
CHA2DS2 VASC SCORE: 1
SEX: FEMALE
PRIOR STROKE OR TIA OR THROMBOEMBOLISM: NO

## 2021-06-26 ASSESSMENT — ENCOUNTER SYMPTOMS
NAUSEA: 1
MUSCULOSKELETAL NEGATIVE: 1
COUGH: 1
ABDOMINAL PAIN: 0
HEARTBURN: 0
PSYCHIATRIC NEGATIVE: 1
NEUROLOGICAL NEGATIVE: 1
SHORTNESS OF BREATH: 1
PALPITATIONS: 1
EYES NEGATIVE: 1
VOMITING: 0

## 2021-06-26 ASSESSMENT — LIFESTYLE VARIABLES
DO YOU DRINK ALCOHOL: NO
DOES PATIENT WANT TO STOP DRINKING: NO

## 2021-06-26 ASSESSMENT — FIBROSIS 4 INDEX: FIB4 SCORE: 0.59

## 2021-06-27 ENCOUNTER — APPOINTMENT (OUTPATIENT)
Dept: RADIOLOGY | Facility: MEDICAL CENTER | Age: 46
End: 2021-06-27
Attending: STUDENT IN AN ORGANIZED HEALTH CARE EDUCATION/TRAINING PROGRAM
Payer: MEDICAID

## 2021-06-27 VITALS
SYSTOLIC BLOOD PRESSURE: 102 MMHG | BODY MASS INDEX: 53.19 KG/M2 | WEIGHT: 289.02 LBS | OXYGEN SATURATION: 97 % | HEIGHT: 62 IN | TEMPERATURE: 96.9 F | DIASTOLIC BLOOD PRESSURE: 55 MMHG | RESPIRATION RATE: 16 BRPM | HEART RATE: 63 BPM

## 2021-06-27 LAB
ALBUMIN SERPL BCP-MCNC: 3.5 G/DL (ref 3.2–4.9)
ALBUMIN/GLOB SERPL: 1.3 G/DL
ALP SERPL-CCNC: 94 U/L (ref 30–99)
ALT SERPL-CCNC: 21 U/L (ref 2–50)
AMPHET UR QL SCN: NEGATIVE
ANION GAP SERPL CALC-SCNC: 7 MMOL/L (ref 7–16)
AST SERPL-CCNC: 11 U/L (ref 12–45)
BARBITURATES UR QL SCN: NEGATIVE
BASOPHILS # BLD AUTO: 0 % (ref 0–1.8)
BASOPHILS # BLD: 0 K/UL (ref 0–0.12)
BENZODIAZ UR QL SCN: NEGATIVE
BILIRUB SERPL-MCNC: 0.3 MG/DL (ref 0.1–1.5)
BUN SERPL-MCNC: 12 MG/DL (ref 8–22)
BZE UR QL SCN: NEGATIVE
CALCIUM SERPL-MCNC: 9 MG/DL (ref 8.5–10.5)
CANNABINOIDS UR QL SCN: NEGATIVE
CHLORIDE SERPL-SCNC: 106 MMOL/L (ref 96–112)
CO2 SERPL-SCNC: 30 MMOL/L (ref 20–33)
CREAT SERPL-MCNC: 1.01 MG/DL (ref 0.5–1.4)
EKG IMPRESSION: NORMAL
EOSINOPHIL # BLD AUTO: 0 K/UL (ref 0–0.51)
EOSINOPHIL NFR BLD: 0 % (ref 0–6.9)
ERYTHROCYTE [DISTWIDTH] IN BLOOD BY AUTOMATED COUNT: 58.5 FL (ref 35.9–50)
GLOBULIN SER CALC-MCNC: 2.8 G/DL (ref 1.9–3.5)
GLUCOSE SERPL-MCNC: 131 MG/DL (ref 65–99)
HCT VFR BLD AUTO: 37.2 % (ref 37–47)
HGB BLD-MCNC: 11 G/DL (ref 12–16)
LYMPHOCYTES # BLD AUTO: 0.81 K/UL (ref 1–4.8)
LYMPHOCYTES NFR BLD: 7 % (ref 22–41)
MANUAL DIFF BLD: NORMAL
MCH RBC QN AUTO: 28.3 PG (ref 27–33)
MCHC RBC AUTO-ENTMCNC: 29.6 G/DL (ref 33.6–35)
MCV RBC AUTO: 95.6 FL (ref 81.4–97.8)
METHADONE UR QL SCN: NEGATIVE
MONOCYTES # BLD AUTO: 0.3 K/UL (ref 0–0.85)
MONOCYTES NFR BLD AUTO: 2.6 % (ref 0–13.4)
MORPHOLOGY BLD-IMP: NORMAL
NEUTROPHILS # BLD AUTO: 10.49 K/UL (ref 2–7.15)
NEUTROPHILS NFR BLD: 90.4 % (ref 44–72)
NRBC # BLD AUTO: 0 K/UL
NRBC BLD-RTO: 0 /100 WBC
OPIATES UR QL SCN: NEGATIVE
OXYCODONE UR QL SCN: NEGATIVE
PCP UR QL SCN: NEGATIVE
PLATELET # BLD AUTO: 242 K/UL (ref 164–446)
PLATELET BLD QL SMEAR: NORMAL
PMV BLD AUTO: 11 FL (ref 9–12.9)
POTASSIUM SERPL-SCNC: 4.4 MMOL/L (ref 3.6–5.5)
PROCALCITONIN SERPL-MCNC: <0.05 NG/ML
PROPOXYPH UR QL SCN: NEGATIVE
PROT SERPL-MCNC: 6.3 G/DL (ref 6–8.2)
RBC # BLD AUTO: 3.89 M/UL (ref 4.2–5.4)
RBC BLD AUTO: NORMAL
SODIUM SERPL-SCNC: 143 MMOL/L (ref 135–145)
TROPONIN T SERPL-MCNC: 18 NG/L (ref 6–19)
TROPONIN T SERPL-MCNC: 19 NG/L (ref 6–19)
WBC # BLD AUTO: 11.6 K/UL (ref 4.8–10.8)

## 2021-06-27 PROCEDURE — 85007 BL SMEAR W/DIFF WBC COUNT: CPT

## 2021-06-27 PROCEDURE — 85027 COMPLETE CBC AUTOMATED: CPT

## 2021-06-27 PROCEDURE — G0378 HOSPITAL OBSERVATION PER HR: HCPCS

## 2021-06-27 PROCEDURE — 84484 ASSAY OF TROPONIN QUANT: CPT

## 2021-06-27 PROCEDURE — 99217 PR OBSERVATION CARE DISCHARGE: CPT | Performed by: INTERNAL MEDICINE

## 2021-06-27 PROCEDURE — 700102 HCHG RX REV CODE 250 W/ 637 OVERRIDE(OP): Performed by: STUDENT IN AN ORGANIZED HEALTH CARE EDUCATION/TRAINING PROGRAM

## 2021-06-27 PROCEDURE — A9270 NON-COVERED ITEM OR SERVICE: HCPCS | Performed by: STUDENT IN AN ORGANIZED HEALTH CARE EDUCATION/TRAINING PROGRAM

## 2021-06-27 PROCEDURE — 80053 COMPREHEN METABOLIC PANEL: CPT

## 2021-06-27 PROCEDURE — 93005 ELECTROCARDIOGRAM TRACING: CPT | Performed by: STUDENT IN AN ORGANIZED HEALTH CARE EDUCATION/TRAINING PROGRAM

## 2021-06-27 PROCEDURE — 93010 ELECTROCARDIOGRAM REPORT: CPT | Performed by: INTERNAL MEDICINE

## 2021-06-27 RX ORDER — OMEPRAZOLE 20 MG/1
20 CAPSULE, DELAYED RELEASE ORAL 2 TIMES DAILY
Status: DISCONTINUED | OUTPATIENT
Start: 2021-06-27 | End: 2021-06-27 | Stop reason: HOSPADM

## 2021-06-27 RX ADMIN — MONTELUKAST 10 MG: 10 TABLET, FILM COATED ORAL at 00:50

## 2021-06-27 RX ADMIN — LITHIUM CARBONATE 300 MG: 300 TABLET, FILM COATED, EXTENDED RELEASE ORAL at 05:34

## 2021-06-27 RX ADMIN — WARFARIN SODIUM 12 MG: 6 TABLET ORAL at 00:49

## 2021-06-27 RX ADMIN — ATORVASTATIN CALCIUM 80 MG: 80 TABLET, FILM COATED ORAL at 05:34

## 2021-06-27 RX ADMIN — LITHIUM CARBONATE 600 MG: 300 TABLET, FILM COATED, EXTENDED RELEASE ORAL at 00:49

## 2021-06-27 RX ADMIN — ARIPIPRAZOLE 30 MG: 15 TABLET ORAL at 05:35

## 2021-06-27 RX ADMIN — OMEPRAZOLE 20 MG: 20 CAPSULE, DELAYED RELEASE ORAL at 05:32

## 2021-06-27 RX ADMIN — DOCUSATE SODIUM 50 MG AND SENNOSIDES 8.6 MG 2 TABLET: 8.6; 5 TABLET, FILM COATED ORAL at 05:31

## 2021-06-27 RX ADMIN — FLUTICASONE PROPIONATE 50 MCG: 50 SPRAY, METERED NASAL at 00:48

## 2021-06-27 RX ADMIN — TIOTROPIUM BROMIDE INHALATION SPRAY 5 MCG: 3.12 SPRAY, METERED RESPIRATORY (INHALATION) at 05:31

## 2021-06-27 RX ADMIN — LIDOCAINE HYDROCHLORIDE 15 ML: 20 SOLUTION OROPHARYNGEAL at 00:50

## 2021-06-27 RX ADMIN — FUROSEMIDE 20 MG: 40 TABLET ORAL at 00:49

## 2021-06-27 RX ADMIN — DOCUSATE SODIUM 50 MG AND SENNOSIDES 8.6 MG 2 TABLET: 8.6; 5 TABLET, FILM COATED ORAL at 00:47

## 2021-06-27 ASSESSMENT — LIFESTYLE VARIABLES
HAVE YOU EVER FELT YOU SHOULD CUT DOWN ON YOUR DRINKING: NO
CONSUMPTION TOTAL: NEGATIVE
TOTAL SCORE: 0
ON A TYPICAL DAY WHEN YOU DRINK ALCOHOL HOW MANY DRINKS DO YOU HAVE: 0
EVER HAD A DRINK FIRST THING IN THE MORNING TO STEADY YOUR NERVES TO GET RID OF A HANGOVER: NO
AVERAGE NUMBER OF DAYS PER WEEK YOU HAVE A DRINK CONTAINING ALCOHOL: 0
TOTAL SCORE: 0
EVER FELT BAD OR GUILTY ABOUT YOUR DRINKING: NO
ALCOHOL_USE: NO
TOTAL SCORE: 0
HOW MANY TIMES IN THE PAST YEAR HAVE YOU HAD 5 OR MORE DRINKS IN A DAY: 0
DOES PATIENT WANT TO STOP DRINKING: NO
HAVE PEOPLE ANNOYED YOU BY CRITICIZING YOUR DRINKING: NO

## 2021-06-27 ASSESSMENT — FIBROSIS 4 INDEX: FIB4 SCORE: 0.6

## 2021-06-27 ASSESSMENT — PAIN DESCRIPTION - PAIN TYPE: TYPE: ACUTE PAIN

## 2021-06-27 NOTE — ED NOTES
Med Rec complete per Pt at bedside.  Allergies reviewed.  No oral ABX     Pt states she's on the Medrol Dose pack and took 4 tablets this morning.     Pt is also taking Warfarin 6mg Monday-Saturday and 9mg on Sundays. Pt states she had not had her dose today.

## 2021-06-27 NOTE — H&P
Hospital Medicine History & Physical Note    Date of Service  6/26/2021    Primary Care Physician  SHANKAR Gleason    Consultants  None    Code Status  Full Code    Chief Complaint  Chief Complaint   Patient presents with   • Chest Pain     started around 1 pm described as a dull pain/pressure       History of Presenting Illness  45 y.o. morbidly obese female with a past medical history of COPD, paroxysmal atrial fibrillation on warfarin, and nicotine use presents emergency department on 6/26/2021 after an episode of substernal chest pain.  Patient reports that she was outside with a friend having a conversation (she had lunch an hour prior) and started having substernal to left-sided chest pain.  She stated that she stood up and walked inside to her kitchen to get a glass of water.  Her chest pain did not worsen with ambulation but improved with a drink of water.  Her pain was still very severe so patient took nitroglycerin as instructed by healthcare provider which worsen her pain.  She associate her chest pain with nausea, bloating, palpitations, diaphoresis and dyspnea.  Of note, patient states that she has been compliant with her Medrol pack tapering dose which was prescribed last time she came to the emergency department. No vomiting, orthostatic changes or loss of consciousness.    At the emergency department, SBP in the 140s.  Patient requiring 2 L nasal cannula to maintain saturations. CBC with leukocytosis at 14 and normocytic anemia with elevated RDW.  proBNP 451 and troponin level.  Chest x-ray with no acute cardiopulmonary abnormalities.  Patient was admitted for chest pain work-up and management.    Review of Systems  Review of Systems   Constitutional: Positive for malaise/fatigue.   HENT: Negative.    Eyes: Negative.    Respiratory: Positive for cough and shortness of breath.    Cardiovascular: Positive for chest pain and palpitations.   Gastrointestinal: Positive for nausea. Negative for  abdominal pain, heartburn and vomiting.   Genitourinary: Negative.    Musculoskeletal: Negative.    Skin: Negative.    Neurological: Negative.    Endo/Heme/Allergies: Negative.    Psychiatric/Behavioral: Negative.        Past Medical History   has a past medical history of Asthma, Bipolar 2 disorder (HCC), Chickenpox, Chronic obstructive pulmonary disease (HCC), Hypertension, Other psychological stress, Schizophrenic disorder (HCC), and Yeast infection of the skin (4/1/2021).    Surgical History   has a past surgical history that includes hysterectomy laparoscopy; colectomy; cholecystectomy; and knee arthroscopy (Left).     Family History  family history includes Cancer in her sister; No Known Problems in her mother.     Social History   reports that she quit smoking about 5 months ago. Her smoking use included cigarettes. She has a 20.00 pack-year smoking history. She has never used smokeless tobacco. She reports that she does not drink alcohol and does not use drugs.    Allergies  Allergies   Allergen Reactions   • Amoxicillin Rash and Itching     Tolerates cephalosporins, pt reports that she gets a rash all over her body and gets itchy   • Penicillin G Rash and Itching     pt reports that she gets a rash all over her body and gets itchy       Medications  Prior to Admission Medications   Prescriptions Last Dose Informant Patient Reported? Taking?   acetaminophen (TYLENOL) 500 MG Tab >7 days at  unknown Patient Yes No   Sig: Take 1,000 mg by mouth every 6 hours as needed (pain).   albuterol 108 (90 Base) MCG/ACT Aero Soln inhalation aerosol 6/26/2021 at Unknown time Patient No No   Sig: Inhale 2 Puffs every 6 hours as needed for Shortness of Breath.   aripiprazole (ABILIFY) 30 MG tablet 6/26/2021 at am Patient Yes No   Sig: Take 30 mg by mouth every morning.   atorvastatin (LIPITOR) 20 MG Tab 6/25/2021 at pm Patient No No   Sig: Take 1 Tab by mouth every day.   fluticasone (FLONASE) 50 MCG/ACT nasal spray  6/26/2021 at Unknown time Patient Yes No   Sig: Administer 1 Spray into affected nostril(S) 2 times a day.   furosemide (LASIX) 20 MG Tab 6/26/2021 at am Patient No No   Sig: Take 1 tablet by mouth 2 times a day.   lithium CR (LITHOBID) 300 MG Tab CR 6/26/2021 at am Patient Yes No   Sig: Take 300-600 mg by mouth 2 Times a Day. 1 caps = 300 mg every morning  2 caps = 600 mg every evening   methylPREDNISolone (MEDROL DOSEPAK) 4 MG Tablet Therapy Pack 6/26/2021 at am Patient No No   Sig: Use as directed   metoprolol SR (TOPROL XL) 25 MG TABLET SR 24 HR 6/26/2021 at am Patient No No   Sig: Take 1 tablet by mouth every day.   montelukast (SINGULAIR) 10 MG Tab 6/25/2021 at pm Patient No No   Sig: Take 1 Tab by mouth every bedtime.   nitroglycerin (NITROSTAT) 0.4 MG SL Tab prn at prn Patient No No   Sig: Place 1 tablet under the tongue as needed for Chest Pain.   nystatin (MYCOSTATIN) powder 6/26/2021 at Unknown time Patient No No   Sig: Apply underneath breasts and on inner thigh area twice a day for fungal infection   potassium chloride SA (KDUR) 20 MEQ Tab CR 6/26/2021 at am Patient No No   Sig: Take 1 tablet by mouth every day.   tiotropium (SPIRIVA HANDIHALER) 18 MCG Cap 6/26/2021 at Unknown time Patient No No   Sig: Inhale 1 Cap by mouth every day.   warfarin (COUMADIN) 6 MG Tab 6/25/2021 at Unknown time Patient Yes Yes   Sig: Take 6-9 mg by mouth every day. 6mg Mon-Sat and 9mg on Sun      Facility-Administered Medications: None       Physical Exam       Physical Exam  Constitutional:       General: She is not in acute distress.     Appearance: Normal appearance. She is obese. She is not ill-appearing, toxic-appearing or diaphoretic.   HENT:      Head: Normocephalic and atraumatic.      Mouth/Throat:      Mouth: Mucous membranes are dry.   Eyes:      Extraocular Movements: Extraocular movements intact.      Pupils: Pupils are equal, round, and reactive to light.   Cardiovascular:      Rate and Rhythm: Normal rate and  regular rhythm.      Pulses: Normal pulses.      Heart sounds: Normal heart sounds.   Pulmonary:      Effort: Respiratory distress present.      Breath sounds: Normal breath sounds.   Abdominal:      General: Bowel sounds are normal. There is no distension.      Palpations: Abdomen is soft.      Tenderness: There is no abdominal tenderness. There is no guarding or rebound.   Musculoskeletal:         General: Swelling present. Normal range of motion.      Cervical back: Normal range of motion and neck supple.      Right lower leg: Edema present.      Left lower leg: Edema present.      Comments: Bilateral 3+ pedal edema  Tender to palpation   Skin:     General: Skin is warm.      Coloration: Skin is not jaundiced.   Neurological:      General: No focal deficit present.      Mental Status: She is alert and oriented to person, place, and time. Mental status is at baseline.      Cranial Nerves: No cranial nerve deficit.   Psychiatric:         Mood and Affect: Mood normal.         Behavior: Behavior normal.         Thought Content: Thought content normal.         Judgment: Judgment normal.         Laboratory:  Recent Labs     06/26/21 2116   WBC 14.0*   RBC 4.34   HEMOGLOBIN 11.9*   HEMATOCRIT 41.0   MCV 94.5   MCH 27.4   MCHC 29.0*   RDW 57.0*   PLATELETCT 277   MPV 11.2     Recent Labs     06/26/21 2116   SODIUM 144   POTASSIUM 3.9   CHLORIDE 105   CO2 29   GLUCOSE 171*   BUN 12   CREATININE 1.03   CALCIUM 9.2     Recent Labs     06/26/21 2116   ALTSGPT 29   ASTSGOT 19   ALKPHOSPHAT 110*   TBILIRUBIN 0.4   GLUCOSE 171*     Recent Labs     06/26/21  2235   INR 1.47*     Recent Labs     06/26/21 2116   NTPROBNP 451*         Recent Labs     06/26/21 2116   TROPONINT 18       Imaging:  DX-CHEST-PORTABLE (1 VIEW)   Final Result         1. No acute cardiopulmonary abnormalities are identified.      NM-CARDIAC STRESS TEST    (Results Pending)         Assessment/Plan:  I anticipate this patient is appropriate for  observation status at this time.    * Pain in the chest- (present on admission)  Assessment & Plan  History of multiple emergency department presentation for chest pain with negative cardiac work-up  Stress test on 3/2021 normal  Echocardiogram on 1/2021 normal  Substernal to left-sided chest pain that started at rest  Pain worsens with nitroglycerin  Pain improves with drinking water  Chest pain noncardiac chest pain atypical in nature  Last A1c on 6/2021 at 6.1  Risk factors: Morbidly obese and smokes tobacco  DDx: GERD, ACS   Admit to telemetry  Cardiac diet  Fluid restriction  Aspirin and high-dose statin  Continue home beta-blocker  PPI twice daily  Trend troponin  Stress test on a.m.    Morbid obesity (HCC)- (present on admission)  Assessment & Plan  Counseling on weight reduction    HLD (hyperlipidemia)- (present on admission)  Assessment & Plan  Continue statin    Paroxysmal A-fib (HCC)- (present on admission)  Assessment & Plan  Continue home metoprolol  Warfarin per pharmacy dosing    Schizophrenia (HCC)- (present on admission)  Assessment & Plan  Continue home lithium and Abilify    Leukocytosis- (present on admission)  Assessment & Plan  Secondary to steroid use  Trend WBC    KATHIA and COPD overlap syndrome (HCC)- (present on admission)  Assessment & Plan  Continue home albuterol, fluticasone, Spiriva, montelukast    DVT prophylaxis Lovenox

## 2021-06-27 NOTE — PROGRESS NOTES
2 RN Skin Assessment Completed by Lidia RN and Liz RN.     Head: WDL  Ears: WDL  Nose: WDL  Mouth: WDL  Neck: WDL  Breasts/Chest: WDL  Shoulder Blades: WDL  Spine: WDL  (R) Arm/Elbow/hand: WDL  (L) Arm/Elbow/hand: WDL  Abdomen: Excoriated/ powder applied  Groin: Excoriated/ powder applied   Sacrum/Coccyx/Buttocks: blanching  (R) Leg: WDL  (L) Leg: WDL  (R) Heel/Foot/Toe: blanching  (L) Heel/Foot/Toe: blanching              Devices in place: BP Cuff and Pulse Ox     Interventions in place: Gray ear foams and Pillows     Possible skin injury found: No     Pictures uploaded into Epic: N/A  Wound Consult Placed: N/A

## 2021-06-27 NOTE — DISCHARGE INSTRUCTIONS
Chest Pain, Nonspecific  It is often hard to give a specific diagnosis for the cause of chest pain. There is always a chance that your pain could be related to something serious, like a heart attack or a blood clot in the lungs. You need to follow up with your caregiver for further evaluation. More lab tests or other studies such as X-rays, electrocardiography, stress testing, or cardiac imaging may be needed to find the cause of your pain.  Most of the time, nonspecific chest pain improves within 2 to 3 days with rest and mild pain medicine. For the next few days, avoid physical exertion or activities that bring on pain. Do not smoke. Avoid drinking alcohol. Call your caregiver for routine follow-up as advised.   SEEK IMMEDIATE MEDICAL CARE IF:  · You develop increased chest pain or pain that radiates to the arm, neck, jaw, back, or abdomen.   · You develop shortness of breath, increased coughing, or you start coughing up blood.   · You have severe back or abdominal pain, nausea, or vomiting.   · You develop severe weakness, fainting, fever, or chills.   Document Released: 12/18/2006 Document Revised: 03/11/2013 Document Reviewed: 06/06/2008  [employer]® Patient Information ©2013 [employer].

## 2021-06-27 NOTE — DISCHARGE SUMMARY
Discharge Summary    CHIEF COMPLAINT ON ADMISSION  Chief Complaint   Patient presents with   • Chest Pain     started around 1 pm described as a dull pain/pressure       Reason for Admission  EMS     Admission Date  6/26/2021    CODE STATUS  Full Code    HPI & HOSPITAL COURSE  This is a 45 y.o. female with a past medical history of COPD, paroxysmal atrial fibrillation on Coumadin, nicotine dependence here with complaints of left-sided chest pain.  She did take 1 dose of nitroglycerin with onset of pain, although reportedly her pain worsened with this.  On admission troponin remained negative.  EKG was negative for ST or T wave abnormalities.  Chest x-ray was negative for cardiopulmonary abnormalities.  The patient had a recent echocardiogram and stress test which were stable.  No need for repeat testing at this time.  Patient's chest pain is reproducible to palpation.  Suspect musculoskeletal inflammation.  Do not suspect ACS.    Also of note patient has some mild redness to bilateral lower extremities, although no evidence of severe infection.  She did receive 1 dose of cefepime in the ED.  No need for further antibiotics at this time.    Patient has no further need for acute intervention and is safe for discharge home.    Therefore, she is discharged in good and stable condition to home with close outpatient follow-up.    Discharge Date  6/27/2021    FOLLOW UP ITEMS POST DISCHARGE  -NSAIDs for pain.    DISCHARGE DIAGNOSES  Principal Problem:    Pain in the chest POA: Yes  Active Problems:    KATHIA and COPD overlap syndrome (HCC) POA: Yes    Leukocytosis POA: Yes    Morbid obesity (HCC) POA: Yes    Schizophrenia (HCC) POA: Yes    Paroxysmal A-fib (HCC) POA: Yes    HLD (hyperlipidemia) POA: Yes  Resolved Problems:    * No resolved hospital problems. *      FOLLOW UP  Future Appointments   Date Time Provider Department Center   6/29/2021 To Be Determined REED  TEAM [facility] None   6/30/2021 To Be Determined Crystal  KAREN Ann [facility] None   7/6/2021 To Be Determined [facility] TEAM [facility] None   7/7/2021 To Be Determined Bouchra Ann [facility] None   7/12/2021 To Be Determined Bouchra Ann [facility] None   7/13/2021 To Be Determined [facility] TEAM [facility] None       MEDICATIONS ON DISCHARGE     Medication List      CONTINUE taking these medications      Instructions   Abilify 30 MG tablet  Generic drug: aripiprazole   Take 30 mg by mouth every morning.  Dose: 30 mg     acetaminophen 500 MG Tabs  Commonly known as: TYLENOL   Take 1,000 mg by mouth every 6 hours as needed (pain).  Dose: 1,000 mg     albuterol 108 (90 Base) MCG/ACT Aers inhalation aerosol   Inhale 2 Puffs every 6 hours as needed for Shortness of Breath.  Dose: 2 Puff     atorvastatin 20 MG Tabs  Commonly known as: LIPITOR   Take 1 Tab by mouth every day.  Dose: 20 mg     fluticasone 50 MCG/ACT nasal spray  Commonly known as: FLONASE   Administer 1 Spray into affected nostril(S) 2 times a day.  Dose: 1 Spray     furosemide 20 MG Tabs  Commonly known as: LASIX   Take 1 tablet by mouth 2 times a day.  Dose: 20 mg     lithium carbonate  MG Tbcr tablet   Take 300-600 mg by mouth 2 Times a Day. 1 caps = 300 mg every morning  2 caps = 600 mg every evening  Dose: 300-600 mg     metoprolol SR 25 MG Tb24  Commonly known as: TOPROL XL   Take 1 tablet by mouth every day.  Dose: 25 mg     montelukast 10 MG Tabs  Commonly known as: SINGULAIR   Take 1 Tab by mouth every bedtime.  Dose: 10 mg     nitroglycerin 0.4 MG Subl  Commonly known as: NITROSTAT   Place 1 tablet under the tongue as needed for Chest Pain.  Dose: 0.4 mg     nystatin powder  Commonly known as: MYCOSTATIN   Apply underneath breasts and on inner thigh area twice a day for fungal infection     potassium chloride SA 20 MEQ Tbcr  Commonly known as: Kdur   Take 1 tablet by mouth every day.  Dose: 20 mEq     Spiriva HandiHaler 18 MCG Caps  Generic drug: tiotropium   Inhale 1 Cap by mouth every  day.  Dose: 18 mcg     warfarin 6 MG Tabs  Commonly known as: COUMADIN   Take 6-9 mg by mouth every day. 6mg Mon-Sat and 9mg on Sun  Dose: 6-9 mg        STOP taking these medications    methylPREDNISolone 4 MG Tbpk  Commonly known as: MEDROL DOSEPAK            Allergies  Allergies   Allergen Reactions   • Amoxicillin Rash and Itching     Tolerates cephalosporins, pt reports that she gets a rash all over her body and gets itchy   • Penicillin G Rash and Itching     pt reports that she gets a rash all over her body and gets itchy       DIET  Orders Placed This Encounter   Procedures   • Diet Order Diet: Cardiac; Second Modifier: (optional): 2 Gram Sodium; Fluid modifications: (optional): 1000 ml Fluid Restriction     Standing Status:   Standing     Number of Occurrences:   1     Order Specific Question:   Diet:     Answer:   Cardiac [6]     Order Specific Question:   Second Modifier: (optional)     Answer:   2 Gram Sodium [7]     Order Specific Question:   Fluid modifications: (optional)     Answer:   1000 ml Fluid Restriction [7]       ACTIVITY  As tolerated.  Weight bearing as tolerated    LABORATORY  Lab Results   Component Value Date    SODIUM 143 06/27/2021    POTASSIUM 4.4 06/27/2021    CHLORIDE 106 06/27/2021    CO2 30 06/27/2021    GLUCOSE 131 (H) 06/27/2021    BUN 12 06/27/2021    CREATININE 1.01 06/27/2021    CREATININE 0.9 09/11/2008        Lab Results   Component Value Date    WBC 11.6 (H) 06/27/2021    HEMOGLOBIN 11.0 (L) 06/27/2021    HEMATOCRIT 37.2 06/27/2021    PLATELETCT 242 06/27/2021

## 2021-06-27 NOTE — CARE PLAN
The patient is Stable - Low risk of patient condition declining or worsening         Progress made toward(s) clinical / shift goals:  Fluid volume     Patient is not progressing towards the following goals:

## 2021-06-27 NOTE — PROGRESS NOTES
Inpatient Anticoagulation Service Note    Date: 6/26/2021    Reason for Anticoagulation: Atrial Fibrillation   Target INR: 2.0 to 3.0  [id number] VASc Score: 1  HAS-BLED Score: 0   Hemoglobin Value: (!) 11.9  Hematocrit Value: 41  Lab Platelet Value: 277    INR from last 7 days     Date/Time INR Value    06/26/21 2235  (!) 1.47        Dose from last 7 days     Date/Time Dose (mg)    06/26/21 2338  12        Significant Interactions: Statin  Bridge Therapy: No    Reversal Agent Administered: Not Applicable  Comments: Warfarin continued for afib. H/H and plts WNL. INR subtherapeutic. Low [id number] score, wouldn't recommend bridge.    Plan:  12 mg tonight, INR in AM  Education Material Provided?: No (chronic therapy)  Pharmacist suggested discharge dosing: Continue home dose of 9 mg Sundays and 6 mg all other days.     Avinash Cohen, PharmD

## 2021-06-27 NOTE — ED PROVIDER NOTES
ED Provider Note    CHIEF COMPLAINT  Chief Complaint   Patient presents with   • Chest Pain     started around 1 pm described as a dull pain/pressure       HPI  Lorene Haro is a 45 y.o. female who presents to the emergency department complaining of chest pain.  The patient describes a pressure on the left side of the chest that began around 1 PM this afternoon while she was walking using her walker.  The patient states that she was recently prescribed nitroglycerin for chest pain and she took 2 nitroglycerin at home without relief and she took the third and had a little bit of relief but continued to have chest pain so she called 911 and was brought to the ER for evaluation.  The patient was given aspirin prior to arrival.  The patient does not recognize any other exacerbating or alleviating factors or precipitating events.  She does have a history of paroxysmal atrial fibrillation and takes Coumadin and states that she has been compliant with her medication use.  The patient says that the nitroglycerin was prescribed by a doctor in [facility] but I cannot find any notes related to this in the computer medical record and nitroglycerin is not listed on the patient's medication list.  In addition the patient says she has a history of cellulitis of the lower extremities and she has developed some redness on the anterior aspect of the lower legs above the ankles on both sides and she is not really clear about how long that area has been red.    REVIEW OF SYSTEMS no fever or chills the patient says she has a chronic cough but no different than usual.  She does not feel that she is recently been ill except for the symptoms described above.  All other systems negative    PAST MEDICAL HISTORY  Past Medical History:   Diagnosis Date   • Asthma    • Bipolar 2 disorder (HCC)    • Chickenpox    • Chronic obstructive pulmonary disease (HCC)    • Hypertension    • Other psychological stress    • Schizophrenic disorder (HCC)     • Yeast infection of the skin 2021       FAMILY HISTORY  Family History   Problem Relation Age of Onset   • No Known Problems Mother    • Cancer Sister        SOCIAL HISTORY  Social History     Socioeconomic History   • Marital status: Single     Spouse name: Not on file   • Number of children: Not on file   • Years of education: Not on file   • Highest education level: Not on file   Occupational History   • Not on file   Tobacco Use   • Smoking status: Former Smoker     Packs/day: 1.00     Years: 20.00     Pack years: 20.00     Types: Cigarettes     Quit date: 2020     Years since quittin.4   • Smokeless tobacco: Never Used   • Tobacco comment: Patient states she quit two days ago    Vaping Use   • Vaping Use: Never used   Substance and Sexual Activity   • Alcohol use: No   • Drug use: No   • Sexual activity: Not on file   Other Topics Concern   • Not on file   Social History Narrative    From [place]     Social Determinants of Health     Financial Resource Strain: Medium Risk   • Difficulty of Paying Living Expenses: Somewhat hard   Food Insecurity: Food Insecurity Present   • Worried About Running Out of Food in the Last Year: Sometimes true   • Ran Out of Food in the Last Year: Sometimes true   Transportation Needs: No Transportation Needs   • Lack of Transportation (Medical): No   • Lack of Transportation (Non-Medical): No   Physical Activity:    • Days of Exercise per Week:    • Minutes of Exercise per Session:    Stress:    • Feeling of Stress :    Social Connections:    • Frequency of Communication with Friends and Family:    • Frequency of Social Gatherings with Friends and Family:    • Attends Anabaptism Services:    • Active Member of Clubs or Organizations:    • Attends Club or Organization Meetings:    • Marital Status:    Intimate Partner Violence:    • Fear of Current or Ex-Partner:    • Emotionally Abused:    • Physically Abused:    • Sexually Abused:        SURGICAL HISTORY  Past  "Surgical History:   Procedure Laterality Date   • CHOLECYSTECTOMY     • COLECTOMY     • HYSTERECTOMY LAPAROSCOPY     • KNEE ARTHROSCOPY Left        CURRENT MEDICATIONS  Home Medications     Reviewed by Hayley Baltazar PhT (Pharmacy Tech) on 06/26/21 at 2222  Med List Status: Complete   Medication Last Dose Status   acetaminophen (TYLENOL) 500 MG Tab >7 days Active   albuterol 108 (90 Base) MCG/ACT Aero Soln inhalation aerosol 6/26/2021 Active   aripiprazole (ABILIFY) 30 MG tablet 6/26/2021 Active   atorvastatin (LIPITOR) 20 MG Tab 6/25/2021 Active   fluticasone (FLONASE) 50 MCG/ACT nasal spray 6/26/2021 Active   furosemide (LASIX) 20 MG Tab 6/26/2021 Active   lithium CR (LITHOBID) 300 MG Tab CR 6/26/2021 Active   methylPREDNISolone (MEDROL DOSEPAK) 4 MG Tablet Therapy Pack 6/26/2021 Active   metoprolol SR (TOPROL XL) 25 MG TABLET SR 24 HR 6/26/2021 Active   montelukast (SINGULAIR) 10 MG Tab 6/25/2021 Active   nitroglycerin (NITROSTAT) 0.4 MG SL Tab prn Active   nystatin (MYCOSTATIN) powder 6/26/2021 Active   potassium chloride SA (KDUR) 20 MEQ Tab CR 6/26/2021 Active   tiotropium (SPIRIVA HANDIHALER) 18 MCG Cap 6/26/2021 Active   warfarin (COUMADIN) 6 MG Tab 6/25/2021 Active                ALLERGIES  Allergies   Allergen Reactions   • Amoxicillin Rash and Itching     Tolerates cephalosporins, pt reports that she gets a rash all over her body and gets itchy   • Penicillin G Rash and Itching     pt reports that she gets a rash all over her body and gets itchy       PHYSICAL EXAM  VITAL SIGNS: Ht 1.549 m (5' 1\")   Wt (!) 130 kg (286 lb)   LMP  (LMP Unknown)   BMI 54.04 kg/m²    Oxygen saturation is interpreted as adequate  Constitutional: Awake lucid verbal cooperative she does not appear toxic or distressed  Eyes: No erythema discharge or jaundice  Neck: Trachea midline no JVD, the neck is quite obese  Cardiovascular: Regular rate and rhythm  Lungs: Distant bilaterally but no apparent difficulty " breathing  Abdomen/Back: Morbidly obese soft and nontender no rebound guarding or peritoneal findings no right upper quadrant tenderness or Mullins sign no right lower quadrant tenderness.  Skin: Warm and dry  Musculoskeletal: The legs are quite obese and there looks like there is chronic lymphedema in the legs also there are bright red areas anteriorly on both legs just above the ankles.  This could represent cellulitis or changes related to nonpitting edema  Neurologic: Awake lucid verbal moving all extremities    CHART REVIEW  The patient had a cardiac stress test in January of this year which reportedly showed no ischemia.  She had an echocardiogram in March showing an EF of 60%..  The patient's most recent INR was on June 22 and the value was therapeutic at 2.3    Laboratory  CBC shows an elevated white blood cell count of 14.0 hemoglobin is adequate at 11.9 basic metabolic panel showed elevated blood sugar of 171 alk phos is 110 troponin is normal of 18 BNP is elevated at 451.  INR has been ordered and the patient does not need Covid testing as she is asymptomatic for Covid and she has had her vaccinations.  Urinalysis has been ordered because of the elevated white blood cell count and this is pending,    EKG interpretation  I reviewed the EMS EKG obtained prior to arrival to twelve-lead EKG showing sinus rhythm about 80 bpm there is no pathologic ST elevation there is a T wave inversion in lead III.    EKG obtained here shows sinus rhythm 79 bpm there is no pathologic ST elevation or depression or ectopy    Radiology  DX-CHEST-PORTABLE (1 VIEW)   Final Result         1. No acute cardiopulmonary abnormalities are identified.      NM-CARDIAC STRESS TEST    (Results Pending)       MEDICAL DECISION MAKING and DISPOSITION  The patient received aspirin prior to arrival.  In the emergency department IVs were maintained she was placed on the cardiac monitor because of my suspicion for lower extremity cellulitis and  her elevated white blood cell count she was given intravenous ceftriaxone.  As mentioned above urinalysis is pending.  It is not clear why the patient is taking nitroglycerin but she states that she is and she describes appropriate use and did not have relief of her pain with after 3 doses so she is come to the ER.  In addition the patient has a history of schizophrenia but she seems quite cooperative and collected at this time.  I have reviewed the case with the hospitalist and the patient will be referred to the CDU for further evaluation and treatment    IMPRESSION  1.  Chest pain of unknown etiology  2.  Lower leg cellulitis         Electronically signed by: Luciano Sifuentes M.D., 6/26/2021 10:40 PM

## 2021-06-27 NOTE — ED TRIAGE NOTES
Chief Complaint   Patient presents with   • Chest Pain     started around 1 pm described as a dull pain/pressure     Pt has hx of CHF, DM, COPD, bipolar/schizophrenia. She began having CP today and took nitro SL x 3 which normally relieves her CP but didn't today. Her cardiologist recommended ED evaluation. She describes dizziness/near syncope with the CP. En route she received 324 mg PO ASA. Pt states she has been compliant with her meds including coumadin and lasix. Pt wear 2L NC baseline for COPD. Currently on 2L NC.

## 2021-06-27 NOTE — PROGRESS NOTES
Assessment completed. Pt A&Ox 4. Respirations are even and unlabored on 2L n/c. Pt denies pain at this time. Monitors applied, VS stable, call light and belongings within reach. POC updated (D/C). Pt educated on room and call light, pt verbalized understanding. Communication board updated. Needs met.

## 2021-06-27 NOTE — ASSESSMENT & PLAN NOTE
Continue home albuterol, fluticasone, Spiriva, montelukast  Saturating well on baseline home 2L  Continue home CPAP  Hold steroids for now  Continue to monitor

## 2021-06-27 NOTE — PROGRESS NOTES
IV dc'd.  Discharge instructions given to patient; patient verbalizes understanding, all questions answered.  Copy of DC summary provided, signed copy in chart.  0 prescriptions electronically sent to pt's pharmacy/provided to patient, copies in chart.  Pt states personal belongings are in possession.  Pt escorted off unit by unit clerk and this RN without incident.

## 2021-06-27 NOTE — ASSESSMENT & PLAN NOTE
History of multiple emergency department presentation for chest pain with negative cardiac work-up  Stress test on 3/2021 normal  Echocardiogram on 1/2021 normal  Substernal to left-sided chest pain that started at rest  Pain worsens with nitroglycerin  Pain improves with drinking water  Chest pain noncardiac chest pain atypical in nature  Last A1c on 6/2021 at 6.1  Risk factors: Morbidly obese and smokes tobacco  DDx: GERD, ACS   Admit to telemetry  Cardiac diet  Fluid restriction  Aspirin and high-dose statin  Continue home beta-blocker  PPI twice daily  Trend troponin  Stress test on a.m.

## 2021-06-27 NOTE — ASSESSMENT & PLAN NOTE
Secondary to steroid use  Does have bilateral lower extremity erythema/swelling, chronic vs infectious   Received 1 dose of Ancef in the ED   Hold sterids for now  Monitor LE swelling  Labs on AM

## 2021-06-29 ENCOUNTER — HOME CARE VISIT (OUTPATIENT)
Dept: HOME HEALTH SERVICES | Facility: HOME HEALTHCARE | Age: 46
End: 2021-06-29
Payer: MEDICAID

## 2021-06-29 ENCOUNTER — ANTICOAGULATION MONITORING (OUTPATIENT)
Dept: VASCULAR LAB | Facility: MEDICAL CENTER | Age: 46
End: 2021-06-29

## 2021-06-29 VITALS
HEART RATE: 75 BPM | TEMPERATURE: 98.8 F | SYSTOLIC BLOOD PRESSURE: 102 MMHG | OXYGEN SATURATION: 97 % | DIASTOLIC BLOOD PRESSURE: 64 MMHG | RESPIRATION RATE: 18 BRPM

## 2021-06-29 DIAGNOSIS — I48.0 PAROXYSMAL A-FIB (HCC): ICD-10-CM

## 2021-06-29 LAB
INR PPP: 2.2 (ref 2–3.5)
INR PPP: 2.2 - CRITICAL LOW: < 0.8, CRITICAL HIGH: > 6.5 (ref 2–3.5)

## 2021-06-29 PROCEDURE — G0299 HHS/HOSPICE OF RN EA 15 MIN: HCPCS

## 2021-06-29 ASSESSMENT — ENCOUNTER SYMPTOMS
NAUSEA: DENIES
SHORTNESS OF BREATH: T
MUSCLE WEAKNESS: 1
VOMITING: DENIES
SEVERE DYSPNEA: 1

## 2021-06-29 ASSESSMENT — ACTIVITIES OF DAILY LIVING (ADL)
AMBULATION ASSISTANCE: STAND BY ASSIST
CURRENT_FUNCTION: STAND BY ASSIST

## 2021-06-30 ENCOUNTER — HOME CARE VISIT (OUTPATIENT)
Dept: HOME HEALTH SERVICES | Facility: HOME HEALTHCARE | Age: 46
End: 2021-06-30
Payer: MEDICAID

## 2021-06-30 VITALS
TEMPERATURE: 96.7 F | OXYGEN SATURATION: 94 % | HEART RATE: 84 BPM | RESPIRATION RATE: 18 BRPM | DIASTOLIC BLOOD PRESSURE: 68 MMHG | SYSTOLIC BLOOD PRESSURE: 118 MMHG

## 2021-06-30 PROCEDURE — G0156 HHCP-SVS OF AIDE,EA 15 MIN: HCPCS

## 2021-06-30 NOTE — PROGRESS NOTES
Attempted to contact the patient about therapeutic INR today, but n/a and VM was full. Will try to reach patient again tomorrow.     6/29/21 5:09pm  Brian Briones PharmD

## 2021-06-30 NOTE — PROGRESS NOTES
OP Telephone Anticoagulation Service Note    Date: 2021      Anticoagulation Summary  As of 2021    INR goal:  2.0-3.0   TTR:  24.0 % (2.5 mo)   INR used for dosin.20 (2021)   Warfarin maintenance plan:  9 mg (6 mg x 1.5) every Sun; 6 mg (6 mg x 1) all other days   Weekly warfarin total:  45 mg   Plan last modified:  Kinjal Gant PharmD (6/10/2021)   Next INR check:  2021   Target end date:  Indefinite    Indications    Paroxysmal A-fib (HCC) [I48.0]             Anticoagulation Episode Summary     INR check location:      Preferred lab:      Send INR reminders to:      Comments:  [facility]         Anticoagulation Patient Findings      INR therapeutic at 2.2.  Spoke w/ pt on phone.  Verified regimen w/ pt.  Instructed pt to continue on with current regimen.  NO s/s bleeding reported per pt.  NO changes in diet reported per pt.  NO changes in medications reported per pt.  Check INR in 1 week(s).  Instructed pt to call clinic at [phone] if there are any questions.  Pt stated understanding.    Pt is not on antiplatelet therapy.    Orders faxed to [facility].    Chris Kang PharmD

## 2021-07-03 ENCOUNTER — APPOINTMENT (OUTPATIENT)
Dept: RADIOLOGY | Facility: MEDICAL CENTER | Age: 46
End: 2021-07-03
Attending: EMERGENCY MEDICINE
Payer: MEDICAID

## 2021-07-03 ENCOUNTER — HOME CARE VISIT (OUTPATIENT)
Dept: HOME HEALTH SERVICES | Facility: HOME HEALTHCARE | Age: 46
End: 2021-07-03
Payer: MEDICAID

## 2021-07-03 ENCOUNTER — HOSPITAL ENCOUNTER (EMERGENCY)
Facility: MEDICAL CENTER | Age: 46
End: 2021-07-03
Attending: EMERGENCY MEDICINE
Payer: MEDICAID

## 2021-07-03 VITALS
SYSTOLIC BLOOD PRESSURE: 99 MMHG | WEIGHT: 289 LBS | TEMPERATURE: 98.1 F | BODY MASS INDEX: 53.18 KG/M2 | HEIGHT: 62 IN | RESPIRATION RATE: 38 BRPM | HEART RATE: 63 BPM | OXYGEN SATURATION: 98 % | DIASTOLIC BLOOD PRESSURE: 51 MMHG

## 2021-07-03 DIAGNOSIS — R04.0 EPISTAXIS: ICD-10-CM

## 2021-07-03 DIAGNOSIS — R07.9 CHEST PAIN, UNSPECIFIED TYPE: ICD-10-CM

## 2021-07-03 LAB
ALBUMIN SERPL BCP-MCNC: 4 G/DL (ref 3.2–4.9)
ALBUMIN/GLOB SERPL: 1.3 G/DL
ALP SERPL-CCNC: 122 U/L (ref 30–99)
ALT SERPL-CCNC: 13 U/L (ref 2–50)
ANION GAP SERPL CALC-SCNC: 7 MMOL/L (ref 7–16)
ANISOCYTOSIS BLD QL SMEAR: ABNORMAL
AST SERPL-CCNC: 13 U/L (ref 12–45)
BASOPHILS # BLD AUTO: 0 % (ref 0–1.8)
BASOPHILS # BLD: 0 K/UL (ref 0–0.12)
BILIRUB SERPL-MCNC: 0.9 MG/DL (ref 0.1–1.5)
BUN SERPL-MCNC: 8 MG/DL (ref 8–22)
CALCIUM SERPL-MCNC: 8.9 MG/DL (ref 8.5–10.5)
CHLORIDE SERPL-SCNC: 104 MMOL/L (ref 96–112)
CO2 SERPL-SCNC: 31 MMOL/L (ref 20–33)
CREAT SERPL-MCNC: 1.01 MG/DL (ref 0.5–1.4)
EKG IMPRESSION: NORMAL
EKG IMPRESSION: NORMAL
EOSINOPHIL # BLD AUTO: 0 K/UL (ref 0–0.51)
EOSINOPHIL NFR BLD: 0 % (ref 0–6.9)
ERYTHROCYTE [DISTWIDTH] IN BLOOD BY AUTOMATED COUNT: 58 FL (ref 35.9–50)
GLOBULIN SER CALC-MCNC: 3 G/DL (ref 1.9–3.5)
GLUCOSE SERPL-MCNC: 133 MG/DL (ref 65–99)
HCT VFR BLD AUTO: 43.5 % (ref 37–47)
HGB BLD-MCNC: 12.5 G/DL (ref 12–16)
LG PLATELETS BLD QL SMEAR: NORMAL
LYMPHOCYTES # BLD AUTO: 1.31 K/UL (ref 1–4.8)
LYMPHOCYTES NFR BLD: 9.6 % (ref 22–41)
MACROCYTES BLD QL SMEAR: ABNORMAL
MANUAL DIFF BLD: NORMAL
MCH RBC QN AUTO: 27.4 PG (ref 27–33)
MCHC RBC AUTO-ENTMCNC: 28.7 G/DL (ref 33.6–35)
MCV RBC AUTO: 95.2 FL (ref 81.4–97.8)
MONOCYTES # BLD AUTO: 0.23 K/UL (ref 0–0.85)
MONOCYTES NFR BLD AUTO: 1.7 % (ref 0–13.4)
MORPHOLOGY BLD-IMP: NORMAL
NEUTROPHILS # BLD AUTO: 12.06 K/UL (ref 2–7.15)
NEUTROPHILS NFR BLD: 88.7 % (ref 44–72)
NRBC # BLD AUTO: 0 K/UL
NRBC BLD-RTO: 0 /100 WBC
PLATELET # BLD AUTO: 275 K/UL (ref 164–446)
PLATELET BLD QL SMEAR: NORMAL
PMV BLD AUTO: 11.2 FL (ref 9–12.9)
POTASSIUM SERPL-SCNC: 3.7 MMOL/L (ref 3.6–5.5)
PROT SERPL-MCNC: 7 G/DL (ref 6–8.2)
RBC # BLD AUTO: 4.57 M/UL (ref 4.2–5.4)
RBC BLD AUTO: PRESENT
SODIUM SERPL-SCNC: 142 MMOL/L (ref 135–145)
TROPONIN T SERPL-MCNC: 23 NG/L (ref 6–19)
TROPONIN T SERPL-MCNC: 25 NG/L (ref 6–19)
WBC # BLD AUTO: 13.6 K/UL (ref 4.8–10.8)

## 2021-07-03 PROCEDURE — 93005 ELECTROCARDIOGRAM TRACING: CPT | Performed by: EMERGENCY MEDICINE

## 2021-07-03 PROCEDURE — 93005 ELECTROCARDIOGRAM TRACING: CPT

## 2021-07-03 PROCEDURE — 99284 EMERGENCY DEPT VISIT MOD MDM: CPT

## 2021-07-03 PROCEDURE — 85027 COMPLETE CBC AUTOMATED: CPT

## 2021-07-03 PROCEDURE — 84484 ASSAY OF TROPONIN QUANT: CPT

## 2021-07-03 PROCEDURE — 85007 BL SMEAR W/DIFF WBC COUNT: CPT

## 2021-07-03 PROCEDURE — 71045 X-RAY EXAM CHEST 1 VIEW: CPT

## 2021-07-03 PROCEDURE — 80053 COMPREHEN METABOLIC PANEL: CPT

## 2021-07-03 ASSESSMENT — FIBROSIS 4 INDEX: FIB4 SCORE: 0.47

## 2021-07-03 ASSESSMENT — PAIN DESCRIPTION - PAIN TYPE: TYPE: ACUTE PAIN

## 2021-07-04 NOTE — ED TRIAGE NOTES
"Chief Complaint   Patient presents with   • Chest Pain     Chest pain x 3 days. No SOB. No radiation of pain to arms. EKG done in triage. Patient on baseline 2L oxygen.   • Epistaxis     Per patient, she had a nose bleed today. Resolved at this time.      Pt amb to triage with steady gait for above complaint.  EKG completed, protocol labs ordered.       Pt is alert and oriented, speaking in full sentences, follows commands and responds appropriately to questions. NAD. Resp are even and unlabored.     Pt placed in lobby. Pt educated on triage process. Pt encouraged to alert staff for any changes.    This RN masked and in appropriate PPE during encounter. Patient also in mask.     /66   Pulse 71   Temp 36.7 °C (98.1 °F) (Temporal)   Resp 16   Ht 1.575 m (5' 2\")   Wt (!) 131 kg (289 lb)   LMP  (LMP Unknown)   SpO2 98%   Breastfeeding No   BMI 52.86 kg/m²     Patient on 2L. Full tank of oxygen with patient.     "

## 2021-07-04 NOTE — CASE COMMUNICATION
"Patient contacted [facility] on call service.  They reached out to on call RN and reported patient was on the line with chest pain and pain between her shoulder blades on her back.  They had already advised patient to call 911 but patient wanted to speak with RN first from home care.  When the representative returned to patient side of the line she was no longer on the phone.  When Rn attempted to call the patient no answer to main line with no voicemail to leave message.  When the other number was attempted a women answered but states she did not know a \"Lorene\" so no message was left.  "

## 2021-07-04 NOTE — ED PROVIDER NOTES
ED Provider Note        Primary care provider: SHANKAR Gleason    I verified that the patient was wearing a mask and I was wearing appropriate PPE every time I entered the room. The patient's mask was on the patient at all times during my encounter except for a brief view of the oropharynx.      CHIEF COMPLAINT  Chief Complaint   Patient presents with   • Chest Pain     Chest pain x 3 days. No SOB. No radiation of pain to arms. EKG done in triage. Patient on baseline 2L oxygen.   • Epistaxis     Per patient, she had a nose bleed today. Resolved at this time.        HPI  Lorene Haro is a 45 y.o. female who presents to the Emergency Department with chief complaint of chest pain x3 days.  Patient here at the hospital frequently for this she has a history of COPD as well as paroxysmal atrial fibrillation and multiple psychiatric diagnoses sees.  Patient reports that she has had pain in the upper chest and the upper right side of her back over the last 3 days.  Nonexertional does seem to be worse in certain positions.  She has shortness of breath at baseline no exacerbation of this today she did smoke a cigarette today.  No headache no altered mental status no fevers no chills she has had minor nausea without emesis she denies any problems with urination or bowel movements no other acute symptoms or concerns at this time.        REVIEW OF SYSTEMS  10 systems reviewed and otherwise negative, pertinent positives and negatives listed in the history of present illness.      PAST MEDICAL HISTORY   has a past medical history of Asthma, Bipolar 2 disorder (HCC), Chickenpox, Chronic obstructive pulmonary disease (HCC), Hypertension, Other psychological stress, Schizophrenic disorder (HCC), and Yeast infection of the skin (4/1/2021).      SURGICAL HISTORY   has a past surgical history that includes hysterectomy laparoscopy; colectomy; cholecystectomy; and knee arthroscopy (Left).      SOCIAL HISTORY  Social History  "    Tobacco Use   • Smoking status: Former Smoker     Packs/day: 1.00     Years: 20.00     Pack years: 20.00     Types: Cigarettes     Quit date: 2020     Years since quittin.5   • Smokeless tobacco: Never Used   • Tobacco comment: Patient states she quit two days ago    Vaping Use   • Vaping Use: Never used   Substance Use Topics   • Alcohol use: No   • Drug use: No      Social History     Substance and Sexual Activity   Drug Use No         FAMILY HISTORY  Non-Contributory      CURRENT MEDICATIONS  Home Medications     Reviewed by Rita Sheridan R.N. (Registered Nurse) on 21 at 1929  Med List Status: Not Addressed   Medication Last Dose Status   acetaminophen (TYLENOL) 500 MG Tab  Active   albuterol 108 (90 Base) MCG/ACT Aero Soln inhalation aerosol  Active   aripiprazole (ABILIFY) 30 MG tablet  Active   atorvastatin (LIPITOR) 20 MG Tab  Active   fluticasone (FLONASE) 50 MCG/ACT nasal spray  Active   furosemide (LASIX) 20 MG Tab  Active   Home Care Oxygen  Active   lithium CR (LITHOBID) 300 MG Tab CR  Active   metoprolol SR (TOPROL XL) 25 MG TABLET SR 24 HR  Active   montelukast (SINGULAIR) 10 MG Tab  Active   nitroglycerin (NITROSTAT) 0.4 MG SL Tab  Active   nystatin (MYCOSTATIN) powder  Active   potassium chloride SA (KDUR) 20 MEQ Tab CR  Active   tiotropium (SPIRIVA HANDIHALER) 18 MCG Cap  Active   warfarin (COUMADIN) 6 MG Tab  Active                ALLERGIES  Allergies   Allergen Reactions   • Amoxicillin Rash and Itching     Tolerates cephalosporins, pt reports that she gets a rash all over her body and gets itchy   • Penicillin G Rash and Itching     pt reports that she gets a rash all over her body and gets itchy       PHYSICAL EXAM  VITAL SIGNS: BP (!) 94/48   Pulse 60   Temp 36.7 °C (98.1 °F) (Temporal)   Resp (!) 25   Ht 1.575 m (5' 2\")   Wt (!) 131 kg (289 lb)   LMP  (LMP Unknown)   SpO2 99%   Breastfeeding No   BMI 52.86 kg/m²   Pulse ox interpretation: I interpret this pulse ox " as normal.  Constitutional: Alert and oriented x 3, minimal distress  HEENT: Atraumatic normocephalic, pupils are equal round, extraocular movements are intact. The nares is clear, external ears are normal, mouth shows moist mucous membranes  Neck: no obvious JVD or tracheal deviation   Cardiovascular: Regular rate and rhythm no murmur rub or gallop   Thorax & Lungs: No respiratory distress, no wheezes rales or rhonchi, No chest tenderness.   GI: Obese, soft nontender nondistended positive bowel sounds, no peritoneal signs  Skin: Warm dry no obvious acute rash or lesion  Musculoskeletal: Moving all extremities with normal range strength, no acute  deformity  Neurologic: Cranial nerves III through XII are grossly intact, no sensory deficit, no cerebellar dysfunction   Psychiatric: Appropriate affect for situation at this time    DIAGNOSTIC STUDIES / PROCEDURES  LABS    Results for orders placed or performed during the hospital encounter of 07/03/21   CBC with Differential   Result Value Ref Range    WBC 13.6 (H) 4.8 - 10.8 K/uL    RBC 4.57 4.20 - 5.40 M/uL    Hemoglobin 12.5 12.0 - 16.0 g/dL    Hematocrit 43.5 37.0 - 47.0 %    MCV 95.2 81.4 - 97.8 fL    MCH 27.4 27.0 - 33.0 pg    MCHC 28.7 (L) 33.6 - 35.0 g/dL    RDW 58.0 (H) 35.9 - 50.0 fL    Platelet Count 275 164 - 446 K/uL    MPV 11.2 9.0 - 12.9 fL    Neutrophils-Polys 88.70 (H) 44.00 - 72.00 %    Lymphocytes 9.60 (L) 22.00 - 41.00 %    Monocytes 1.70 0.00 - 13.40 %    Eosinophils 0.00 0.00 - 6.90 %    Basophils 0.00 0.00 - 1.80 %    Nucleated RBC 0.00 /100 WBC    Neutrophils (Absolute) 12.06 (H) 2.00 - 7.15 K/uL    Lymphs (Absolute) 1.31 1.00 - 4.80 K/uL    Monos (Absolute) 0.23 0.00 - 0.85 K/uL    Eos (Absolute) 0.00 0.00 - 0.51 K/uL    Baso (Absolute) 0.00 0.00 - 0.12 K/uL    NRBC (Absolute) 0.00 K/uL    Anisocytosis 1+     Macrocytosis 1+    Complete Metabolic Panel (CMP)   Result Value Ref Range    Sodium 142 135 - 145 mmol/L    Potassium 3.7 3.6 - 5.5  mmol/L    Chloride 104 96 - 112 mmol/L    Co2 31 20 - 33 mmol/L    Anion Gap 7.0 7.0 - 16.0    Glucose 133 (H) 65 - 99 mg/dL    Bun 8 8 - 22 mg/dL    Creatinine 1.01 0.50 - 1.40 mg/dL    Calcium 8.9 8.5 - 10.5 mg/dL    AST(SGOT) 13 12 - 45 U/L    ALT(SGPT) 13 2 - 50 U/L    Alkaline Phosphatase 122 (H) 30 - 99 U/L    Total Bilirubin 0.9 0.1 - 1.5 mg/dL    Albumin 4.0 3.2 - 4.9 g/dL    Total Protein 7.0 6.0 - 8.2 g/dL    Globulin 3.0 1.9 - 3.5 g/dL    A-G Ratio 1.3 g/dL   Troponin   Result Value Ref Range    Troponin T 25 (H) 6 - 19 ng/L   ESTIMATED GFR   Result Value Ref Range    GFR If African American >60 >60 mL/min/1.73 m 2    GFR If Non African American 59 (A) >60 mL/min/1.73 m 2   DIFFERENTIAL MANUAL   Result Value Ref Range    Manual Diff Status PERFORMED    PERIPHERAL SMEAR REVIEW   Result Value Ref Range    Peripheral Smear Review see below    PLATELET ESTIMATE   Result Value Ref Range    Plt Estimation Normal    MORPHOLOGY   Result Value Ref Range    RBC Morphology Present     Large Platelets 1+    EKG (NOW)   Result Value Ref Range    Report       [facility] Emergency Dept.    Test Date:  2021  Pt Name:    ADELINA MILLAN                Department: ER  MRN:        [id number]                      Room:  Gender:     Female                       Technician: [id number]  :        1975                   Requested By:ER TRIAGE PROTOCOL  Order #:    [id number]                    Reading MD: PATY MILLARD MD    Measurements  Intervals                                Axis  Rate:       67                           P:          50  CT:         135                          QRS:        47  QRSD:       98                           T:          3  QT:         380  QTc:        401    Interpretive Statements  Sinus rhythm  Probable left atrial enlargement  Low voltage, precordial leads  ST elev, probable normal early repol pattern  Compared to ECG 2021 02:46:48  Low QRS voltage now present  ST  "(T wave) deviation still present  Electronically Signed On 7-3-2021 22:24:22 PDT by PATY MILLARD MD       All labs reviewed by me.      RADIOLOGY  DX-CHEST-PORTABLE (1 VIEW)   Final Result      Hypoinflation without other evidence for acute cardiopulmonary disease.           The radiologist's interpretation of all radiological studies have been reviewed by me.      COURSE & MEDICAL DECISION MAKING  Pertinent Labs & Imaging studies reviewed. (See chart for details)    10:22 PM - Patient seen and examined at bedside.       Patient noted to have slightly elevated blood pressure likely circumstantial secondary to presenting complaint. Referred to primary care physician for further evaluation.    Medical Decision Making: Has been here several times recently with similar complaints she had negative stress test in January of this year her troponin is at her baseline and repeat is actually decreasing not increasing she is feeling better at this time.  She is instructed to follow-up with primary care to return here for any further chest pain shortness of breath any other acute symptoms or concerns.  She complained of epistaxis earlier in the day but has had no active nose bleeding here.  Discharged in stable and improved condition.  Heart score 2.    BP (!) 94/48   Pulse 60   Temp 36.7 °C (98.1 °F) (Temporal)   Resp (!) 25   Ht 1.575 m (5' 2\")   Wt (!) 131 kg (289 lb)   LMP  (LMP Unknown)   SpO2 99%   Breastfeeding No   BMI 52.86 kg/m²     Sheridan Dinh, A.P.N.  [address] [place] [id number] [phone]    In 2 days  if symptoms persist    [facility], Emergency Dept  [facility] [facility] [address] [phone]    in 12-24 hours if symptoms persist, immediately If symptoms worsen, or if you develop any other symptoms or concerns      New Prescriptions    No medications on file       FINAL IMPRESSION  1. Epistaxis Inactive   2. Chest pain, unspecified type Inactive    "       This dictation has been created using voice recognition software and/or scribes. The accuracy of the dictation is limited by the abilities of the software and the expertise of the scribes. I expect there may be some errors of grammar and possibly content. I made every attempt to manually correct the errors within my dictation. However, errors related to voice recognition software and/or scribes may still exist and should be interpreted within the appropriate context.

## 2021-07-06 ENCOUNTER — HOSPITAL ENCOUNTER (EMERGENCY)
Facility: MEDICAL CENTER | Age: 46
End: 2021-07-06
Attending: EMERGENCY MEDICINE
Payer: MEDICAID

## 2021-07-06 VITALS
WEIGHT: 289 LBS | HEART RATE: 71 BPM | TEMPERATURE: 97.2 F | BODY MASS INDEX: 53.18 KG/M2 | RESPIRATION RATE: 16 BRPM | OXYGEN SATURATION: 99 % | HEIGHT: 62 IN | DIASTOLIC BLOOD PRESSURE: 60 MMHG | SYSTOLIC BLOOD PRESSURE: 102 MMHG

## 2021-07-06 DIAGNOSIS — J02.9 SORE THROAT: ICD-10-CM

## 2021-07-06 LAB — S PYO DNA SPEC NAA+PROBE: NOT DETECTED

## 2021-07-06 PROCEDURE — 87651 STREP A DNA AMP PROBE: CPT

## 2021-07-06 PROCEDURE — 99283 EMERGENCY DEPT VISIT LOW MDM: CPT

## 2021-07-06 ASSESSMENT — FIBROSIS 4 INDEX: FIB4 SCORE: 0.62

## 2021-07-06 ASSESSMENT — LIFESTYLE VARIABLES: DO YOU DRINK ALCOHOL: NO

## 2021-07-06 NOTE — ED PROVIDER NOTES
ED Provider Note    Scribed for Lazara Lee M.D. by Josue Cortez-Reyes. 2021, 4:44 PM.    Primary care provider: SHANKAR Gleason  Means of arrival: EMS  History obtained from: Patient  History limited by: None    CHIEF COMPLAINT  Chief Complaint   Patient presents with   • Sore Throat     x3 days   • Cough     productive with yellow sputum, x3 days       HPI  Lorene Haro is a 45 y.o. female who presents to the Emergency Department complaining of a sore throat onset 3 days ago. The patient states that she has had general malaise for the past 3 weeks. She declares that she was seen and evaluated here at the ED 3 days ago for an unrelated chest pain and shortness of breath. The patient was discharged at that time with no abnormal findings. She states that she has an additional cough onset 3 days ago with yellow sputum production but denies any fever. The patient adds that she has a history of COPD and notes that she uses 2 liters of oxygen at baseline. She also notes that she has an upcoming appointment with her doctor on Friday but she does not believe she can make it until then with her current symptoms.    REVIEW OF SYSTEMS  Pertinent positives include general malaise, sore throat, cough, and sputum production. Pertinent negatives include no fever.    PAST MEDICAL HISTORY   has a past medical history of Asthma, Bipolar 2 disorder (HCC), Chickenpox, Chronic obstructive pulmonary disease (HCC), Hypertension, Other psychological stress, Schizophrenic disorder (HCC), and Yeast infection of the skin (2021).    SURGICAL HISTORY   has a past surgical history that includes hysterectomy laparoscopy; colectomy; cholecystectomy; and knee arthroscopy (Left).    SOCIAL HISTORY  Social History     Tobacco Use   • Smoking status: Current Every Day Smoker     Packs/day: 1.00     Years: 20.00     Pack years: 20.00     Types: Cigarettes     Last attempt to quit: 2020     Years since quittin.5   •  "Smokeless tobacco: Never Used   • Tobacco comment: Patient states she quit two days ago    Vaping Use   • Vaping Use: Never used   Substance Use Topics   • Alcohol use: No   • Drug use: No      Social History     Substance and Sexual Activity   Drug Use No       FAMILY HISTORY  Family History   Problem Relation Age of Onset   • No Known Problems Mother    • Cancer Sister        CURRENT MEDICATIONS  Home Medications     Reviewed by Nadir Brown R.N. (Registered Nurse) on 07/06/21 at 1351  Med List Status: Not Addressed   Medication Last Dose Status   acetaminophen (TYLENOL) 500 MG Tab  Active   albuterol 108 (90 Base) MCG/ACT Aero Soln inhalation aerosol  Active   aripiprazole (ABILIFY) 30 MG tablet  Active   atorvastatin (LIPITOR) 20 MG Tab  Active   fluticasone (FLONASE) 50 MCG/ACT nasal spray  Active   furosemide (LASIX) 20 MG Tab  Active   Home Care Oxygen  Active   lithium CR (LITHOBID) 300 MG Tab CR  Active   metoprolol SR (TOPROL XL) 25 MG TABLET SR 24 HR  Active   montelukast (SINGULAIR) 10 MG Tab  Active   nitroglycerin (NITROSTAT) 0.4 MG SL Tab  Active   nystatin (MYCOSTATIN) powder  Active   potassium chloride SA (KDUR) 20 MEQ Tab CR  Active   tiotropium (SPIRIVA HANDIHALER) 18 MCG Cap  Active   warfarin (COUMADIN) 6 MG Tab  Active                ALLERGIES  Allergies   Allergen Reactions   • Amoxicillin Rash and Itching     Tolerates cephalosporins, pt reports that she gets a rash all over her body and gets itchy   • Penicillin G Rash and Itching     pt reports that she gets a rash all over her body and gets itchy       PHYSICAL EXAM  VITAL SIGNS: /60   Pulse 91   Temp 36.2 °C (97.2 °F) (Temporal)   Resp 18   Ht 1.575 m (5' 2\")   Wt (!) 131 kg (289 lb)   LMP  (LMP Unknown)   SpO2 96%   BMI 52.86 kg/m²   Constitutional: Alert in no apparent distress.  HENT: No signs of trauma, Bilateral external ears normal, Nose normal.  Posterior pharynx normal. A bit of scabby red blood on left ear " "canal. TMs normal.  Eyes: Pupils are equal and reactive, Conjunctiva normal, Non-icteric.   Neck: Normal range of motion, No tenderness, Supple, No stridor.   Cardiovascular: Regular rate and rhythm, no murmurs.   Thorax & Lungs: Normal breath sounds, No respiratory distress, No wheezing, No chest tenderness.   Abdomen: Bowel sounds normal, Soft, No tenderness, No masses, No peritoneal signs.  Skin: Warm, Dry, No erythema, No rash.   Musculoskeletal:  No major deformities noted.   Neurologic: Alert, moving all extremities without difficulty, no focal deficits.    LABS  Labs Reviewed   GROUP A STREP BY PCR     All labs reviewed by me.    COURSE & MEDICAL DECISION MAKING  Pertinent Labs & Imaging studies reviewed. (See chart for details)    4:44 PM - Patient seen and examined at bedside. Ordered Group A strep by PCR to evaluate her symptoms.     6:56 PM - I reevaluated the patient at bedside. I informed her that I reviewed her diagnostic study results from her last visit and informed her that there were no acute abnormalities in her chest or lungs. I also informed the patient that she does not have a strep infection at this time. I discussed plan for discharge and follow up as outlined below. The patient verbalizes they feel comfortable going home. The patient is stable for discharge at this time and will return for any new or worsening symptoms. Patient verbalizes understanding and support with my plan for discharge. Review of vital signs at this visit show: /60   Pulse 71   Temp 36.2 °C (97.2 °F) (Temporal)   Resp 16   Ht 1.575 m (5' 2\")   Wt (!) 131 kg (289 lb)   LMP  (LMP Unknown)   SpO2 99%   BMI 52.86 kg/m²     Decision Making:  This is a 45 y.o. year old female who presents with sore throat.  Patient is well-known to this facility she has had many visits recently.  She was seen 3 days ago for some shortness of breath and had a totally normal work-up.  On exam her posterior pharynx is normal.  She " does not appear to have any evidence of ear infection on exam.  Rapid strep is negative patient will be discharged.    The patient will return for new or worsening symptoms and is stable at the time of discharge. Patient was given return precautions. Patient and/or family member verbalizes understanding and will comply.    DISPOSITION:  Patient will be discharged home in stable condition.    FOLLOW UP:  SHANKAR Gleason  [address] [facility] [id number] [phone]      As needed    [facility], Emergency Dept  [address] [facility] [id number] [phone]    Return for difficulty breathing fevers or other concerns.    FINAL IMPRESSION  1. Sore throat               This dictation has been created using voice recognition software and/or scribes. The accuracy of the dictation is limited by the abilities of the software and the expertise of the scribes. I expect there may be some errors of grammar and possibly content. I made every attempt to manually correct the errors within my dictation. However, errors related to voice recognition software and/or scribes may still exist and should be interpreted within the appropriate context.     I, Josue Cortez-Reyes (Scribe), am scribing for, and in the presence of, Lazara Lee M.D..    Electronically signed by: Josue Cortez-Reyes ([employer]), 7/6/2021    Lazara HARRIS M.D. personally performed the services described in this documentation, as scribed by Josue Cortez-Reyes in my presence, and it is both accurate and complete. E.    The note accurately reflects work and decisions made by me.  Lazara Lee M.D.  7/6/2021  10:27 PM

## 2021-07-06 NOTE — ED TRIAGE NOTES
Chief Complaint   Patient presents with   • Sore Throat     x3 days   • Cough     productive with yellow sputum, x3 days     Pt BIBA to triage for above complaint. Pt wears 2L NC at baseline for COPD. Pt has history of AFIB and diabetes. VSS. Pt denies any sick contacts or exposure to covid. Pt recently tested negative for covid.

## 2021-07-07 ENCOUNTER — HOME CARE VISIT (OUTPATIENT)
Dept: HOME HEALTH SERVICES | Facility: HOME HEALTHCARE | Age: 46
End: 2021-07-07
Payer: MEDICAID

## 2021-07-07 ENCOUNTER — ANTICOAGULATION MONITORING (OUTPATIENT)
Dept: VASCULAR LAB | Facility: MEDICAL CENTER | Age: 46
End: 2021-07-07

## 2021-07-07 DIAGNOSIS — I48.0 PAROXYSMAL A-FIB (HCC): ICD-10-CM

## 2021-07-07 LAB
INR PPP: 1.3 (ref 2–3.5)
INR PPP: 1.3 - CRITICAL LOW: < 0.8, CRITICAL HIGH: > 6.5 (ref 2–3.5)

## 2021-07-07 PROCEDURE — G0156 HHCP-SVS OF AIDE,EA 15 MIN: HCPCS

## 2021-07-07 PROCEDURE — G0299 HHS/HOSPICE OF RN EA 15 MIN: HCPCS

## 2021-07-07 NOTE — PROGRESS NOTES
Anticoagulation Summary  As of 7/7/2021    INR goal:  2.0-3.0   TTR:  23.8 % (2.8 mo)   INR used for dosing:     Warfarin maintenance plan:  9 mg (6 mg x 1.5) every Sun; 6 mg (6 mg x 1) all other days   Weekly warfarin total:  45 mg   Plan last modified:  Kinjal Gant PharmD (6/10/2021)   Next INR check:  7/16/2021   Target end date:  Indefinite    Indications    Paroxysmal A-fib (HCC) [I48.0]             Anticoagulation Episode Summary     INR check location:      Preferred lab:      Send INR reminders to:      Comments:  [facility]         Anticoagulation Patient Findings      Spoke with pt.  INR is subtherapeutic.   Pt denies any unusual s/s of bleeding, bruising, clotting or any changes to diet or medications. Denies any etoh, cranberries, supplements, or illness.   Pt verifies warfarin weekly dosing. Pt missed a dose on Sunday.    Will have pt bolus x 1 day then continue regimen    Repeat INR in 1 week(s) via [facility].     Kinjal Gant, HildaD

## 2021-07-08 ENCOUNTER — HOSPITAL ENCOUNTER (EMERGENCY)
Facility: MEDICAL CENTER | Age: 46
End: 2021-07-08
Attending: EMERGENCY MEDICINE
Payer: MEDICAID

## 2021-07-08 VITALS
DIASTOLIC BLOOD PRESSURE: 52 MMHG | HEIGHT: 62 IN | BODY MASS INDEX: 52.09 KG/M2 | OXYGEN SATURATION: 96 % | WEIGHT: 283.07 LBS | SYSTOLIC BLOOD PRESSURE: 114 MMHG | RESPIRATION RATE: 14 BRPM | HEART RATE: 81 BPM | TEMPERATURE: 97.5 F

## 2021-07-08 VITALS
RESPIRATION RATE: 18 BRPM | TEMPERATURE: 97.6 F | DIASTOLIC BLOOD PRESSURE: 58 MMHG | HEART RATE: 69 BPM | OXYGEN SATURATION: 98 % | SYSTOLIC BLOOD PRESSURE: 96 MMHG

## 2021-07-08 DIAGNOSIS — E86.0 DEHYDRATION: ICD-10-CM

## 2021-07-08 DIAGNOSIS — J02.9 PHARYNGITIS, UNSPECIFIED ETIOLOGY: ICD-10-CM

## 2021-07-08 LAB
ANION GAP SERPL CALC-SCNC: 9 MMOL/L (ref 7–16)
BASOPHILS # BLD AUTO: 0.2 % (ref 0–1.8)
BASOPHILS # BLD: 0.02 K/UL (ref 0–0.12)
BUN SERPL-MCNC: 8 MG/DL (ref 8–22)
CALCIUM SERPL-MCNC: 9.3 MG/DL (ref 8.5–10.5)
CHLORIDE SERPL-SCNC: 103 MMOL/L (ref 96–112)
CO2 SERPL-SCNC: 31 MMOL/L (ref 20–33)
CREAT SERPL-MCNC: 1.36 MG/DL (ref 0.5–1.4)
EOSINOPHIL # BLD AUTO: 0.11 K/UL (ref 0–0.51)
EOSINOPHIL NFR BLD: 0.9 % (ref 0–6.9)
ERYTHROCYTE [DISTWIDTH] IN BLOOD BY AUTOMATED COUNT: 55.2 FL (ref 35.9–50)
GLUCOSE SERPL-MCNC: 115 MG/DL (ref 65–99)
HCT VFR BLD AUTO: 40.9 % (ref 37–47)
HGB BLD-MCNC: 12.2 G/DL (ref 12–16)
IMM GRANULOCYTES # BLD AUTO: 0.06 K/UL (ref 0–0.11)
IMM GRANULOCYTES NFR BLD AUTO: 0.5 % (ref 0–0.9)
INR PPP: 1.33 (ref 0.87–1.13)
LYMPHOCYTES # BLD AUTO: 1.95 K/UL (ref 1–4.8)
LYMPHOCYTES NFR BLD: 15.5 % (ref 22–41)
MCH RBC QN AUTO: 27.8 PG (ref 27–33)
MCHC RBC AUTO-ENTMCNC: 29.8 G/DL (ref 33.6–35)
MCV RBC AUTO: 93.2 FL (ref 81.4–97.8)
MONOCYTES # BLD AUTO: 0.77 K/UL (ref 0–0.85)
MONOCYTES NFR BLD AUTO: 6.1 % (ref 0–13.4)
NEUTROPHILS # BLD AUTO: 9.65 K/UL (ref 2–7.15)
NEUTROPHILS NFR BLD: 76.8 % (ref 44–72)
NRBC # BLD AUTO: 0 K/UL
NRBC BLD-RTO: 0 /100 WBC
PLATELET # BLD AUTO: 274 K/UL (ref 164–446)
PMV BLD AUTO: 11.7 FL (ref 9–12.9)
POTASSIUM SERPL-SCNC: 3.6 MMOL/L (ref 3.6–5.5)
PROTHROMBIN TIME: 16.1 SEC (ref 12–14.6)
RBC # BLD AUTO: 4.39 M/UL (ref 4.2–5.4)
SODIUM SERPL-SCNC: 143 MMOL/L (ref 135–145)
WBC # BLD AUTO: 12.6 K/UL (ref 4.8–10.8)

## 2021-07-08 PROCEDURE — 85610 PROTHROMBIN TIME: CPT

## 2021-07-08 PROCEDURE — 36415 COLL VENOUS BLD VENIPUNCTURE: CPT

## 2021-07-08 PROCEDURE — 85025 COMPLETE CBC W/AUTO DIFF WBC: CPT

## 2021-07-08 PROCEDURE — 99284 EMERGENCY DEPT VISIT MOD MDM: CPT

## 2021-07-08 PROCEDURE — 80048 BASIC METABOLIC PNL TOTAL CA: CPT

## 2021-07-08 ASSESSMENT — ENCOUNTER SYMPTOMS
MUSCLE WEAKNESS: 1
NAUSEA: DENIES
VOMITING: DENIES
SHORTNESS OF BREATH: T
SEVERE DYSPNEA: 1

## 2021-07-08 ASSESSMENT — FIBROSIS 4 INDEX
FIB4 SCORE: 0.62
FIB4 SCORE: 0.62

## 2021-07-08 NOTE — ED TRIAGE NOTES
"Chief Complaint   Patient presents with   • N/V     pt c/o persistent n/v for approx 4-5 days   • Sore Throat     pt c/o sore throat onset approx 3 days ago       EMS to triage for above complaint.   Educated on triage process, encourage to inform staff of any changes.     /76   Pulse 91   Temp 36.4 °C (97.5 °F) (Temporal)   Resp 14   Ht 1.575 m (5' 2\")   Wt (!) 128 kg (283 lb 1.1 oz)   LMP  (LMP Unknown)   SpO2 95%   BMI 51.77 kg/m²     "

## 2021-07-08 NOTE — ED PROVIDER NOTES
ED Provider Note    CHIEF COMPLAINT  Chief Complaint   Patient presents with   • N/V     pt c/o persistent n/v for approx 4-5 days   • Sore Throat     pt c/o sore throat onset approx 3 days ago       HPI  Lorenesamina Haro is a 45 y.o. female who presents to the emergency department with recurrent visit for persistent sore throat with additional nausea and vomiting. Symptoms now ongoing for week. Seen here earlier in the week for the same. Tonight went to the [place] to hang out with her friends during which time she continues not feel well as she is not been feeling well and therefore decided come back to the emergency department [facility] Denies any significant changes from before. Primarily complaining that she is unable to tolerate significant PO intake including hydration due to her symptoms. No fever chills. No diarrhea. No urinary symptoms. No lightheaded dizziness.    REVIEW OF SYSTEMS  See HPI for further details. All other systems are negative.     PAST MEDICAL HISTORY   has a past medical history of Asthma, Bipolar 2 disorder ([facility]), Chickenpox, Chronic obstructive pulmonary disease ([facility]), Hypertension, Other psychological stress, Schizophrenic disorder ([facility]), and Yeast infection of the skin (2021).    SOCIAL HISTORY  Social History     Tobacco Use   • Smoking status: Current Every Day Smoker     Packs/day: 1.00     Years: 20.00     Pack years: 20.00     Types: Cigarettes     Last attempt to quit: 2020     Years since quittin.5   • Smokeless tobacco: Never Used   • Tobacco comment: Patient states she quit two days ago    Vaping Use   • Vaping Use: Never used   Substance and Sexual Activity   • Alcohol use: No   • Drug use: No   • Sexual activity: Not on file       SURGICAL HISTORY   has a past surgical history that includes hysterectomy laparoscopy; colectomy; cholecystectomy; and knee arthroscopy (Left).    CURRENT MEDICATIONS  Home Medications    **Home medications  "have not yet been reviewed for this encounter**         ALLERGIES  Allergies   Allergen Reactions   • Amoxicillin Rash and Itching     Tolerates cephalosporins, pt reports that she gets a rash all over her body and gets itchy   • Penicillin G Rash and Itching     pt reports that she gets a rash all over her body and gets itchy       PHYSICAL EXAM  VITAL SIGNS: /52   Pulse 81   Temp 36.4 °C (97.5 °F) (Temporal)   Resp 14   Ht 1.575 m (5' 2\")   Wt (!) 128 kg (283 lb 1.1 oz)   LMP  (LMP Unknown)   SpO2 96%   BMI 51.77 kg/m²  @ALISSON[[id number]::@   Pulse ox interpretation: I interpret this pulse ox as normal.  Constitutional: Alert in no apparent distress.  HENT: No signs of trauma, Bilateral external ears normal, Nose normal.   Eyes: Pupils are equal and reactive  Neck: Normal range of motion, No tenderness, Supple  Cardiovascular: Regular rate and rhythm, no murmurs.   Thorax & Lungs: Normal breath sounds, No respiratory distress, No wheezing, No chest tenderness.   Abdomen: obese, Bowel sounds normal, Soft, No tenderness, No masses, No pulsatile masses. No peritoneal signs.  Skin: Warm, Dry, No erythema, No rash.   Extremities: Intact distal pulses, No edema  Musculoskeletal: Good range of motion in all major joints. No tenderness to palpation or major deformities noted.   Neurologic: Alert , Normal motor function, Normal sensory function, No focal deficits noted.   Psychiatric: Affect normal, Judgment normal, Mood normal.       DIAGNOSTIC STUDIES / PROCEDURES    LABS  Results for orders placed or performed during the hospital encounter of 07/08/21   CBC WITH DIFFERENTIAL   Result Value Ref Range    WBC 12.6 (H) 4.8 - 10.8 K/uL    RBC 4.39 4.20 - 5.40 M/uL    Hemoglobin 12.2 12.0 - 16.0 g/dL    Hematocrit 40.9 37.0 - 47.0 %    MCV 93.2 81.4 - 97.8 fL    MCH 27.8 27.0 - 33.0 pg    MCHC 29.8 (L) 33.6 - 35.0 g/dL    RDW 55.2 (H) 35.9 - 50.0 fL    Platelet Count 274 164 - 446 K/uL    MPV 11.7 9.0 - 12.9 fL    " Neutrophils-Polys 76.80 (H) 44.00 - 72.00 %    Lymphocytes 15.50 (L) 22.00 - 41.00 %    Monocytes 6.10 0.00 - 13.40 %    Eosinophils 0.90 0.00 - 6.90 %    Basophils 0.20 0.00 - 1.80 %    Immature Granulocytes 0.50 0.00 - 0.90 %    Nucleated RBC 0.00 /100 WBC    Neutrophils (Absolute) 9.65 (H) 2.00 - 7.15 K/uL    Lymphs (Absolute) 1.95 1.00 - 4.80 K/uL    Monos (Absolute) 0.77 0.00 - 0.85 K/uL    Eos (Absolute) 0.11 0.00 - 0.51 K/uL    Baso (Absolute) 0.02 0.00 - 0.12 K/uL    Immature Granulocytes (abs) 0.06 0.00 - 0.11 K/uL    NRBC (Absolute) 0.00 K/uL   BASIC METABOLIC PANEL   Result Value Ref Range    Sodium 143 135 - 145 mmol/L    Potassium 3.6 3.6 - 5.5 mmol/L    Chloride 103 96 - 112 mmol/L    Co2 31 20 - 33 mmol/L    Glucose 115 (H) 65 - 99 mg/dL    Bun 8 8 - 22 mg/dL    Creatinine 1.36 0.50 - 1.40 mg/dL    Calcium 9.3 8.5 - 10.5 mg/dL    Anion Gap 9.0 7.0 - 16.0   PT/INR   Result Value Ref Range    PT 16.1 (H) 12.0 - 14.6 sec    INR 1.33 (H) 0.87 - 1.13   ESTIMATED GFR   Result Value Ref Range    GFR If African American 50 (A) >60 mL/min/1.73 m 2    GFR If Non  42 (A) >60 mL/min/1.73 m 2         RADIOLOGY  No orders to display           COURSE & MEDICAL DECISION MAKING  Pertinent Labs & Imaging studies reviewed. (See chart for details)  45-year-old female presented back to the emergency room with persistent symptoms as above. Patient states that she continue to feel unwell after she got casino in her for EMS prior here for further reevaluation. Workup tonight is unremarkable. Patient agreeable to outpatient follow-up with this point. I have strongly encouraged her to continue oral hydration especially with liquids rather than solids as it seems to be aggravating or circular this time.     The patient will return for worsening symptoms and is stable at the time of discharge. The patient verbalizes understanding and will comply.    FINAL IMPRESSION  viral  syndrome  dehydration      Electronically signed by: Juan Ann M.D., 7/8/2021 4:13 AM

## 2021-07-08 NOTE — ED NOTES
Pt states she is normally on 2L of O2 at home, did not bring any with her. In room, pt's O2 noted to be low at 88%, pt placed on 2 liters, now at 97%

## 2021-07-08 NOTE — ED NOTES
Patient educated on discharge instructions and home care. Patient verbalized understanding. Patient ambulated to [facility].

## 2021-07-08 NOTE — ED NOTES
IV started and labs drawn. EKG completed. Updated patient on plan of care, verbalized understanding.

## 2021-07-09 ENCOUNTER — TELEPHONE (OUTPATIENT)
Dept: SCHEDULING | Facility: IMAGING CENTER | Age: 46
End: 2021-07-09

## 2021-07-10 ENCOUNTER — APPOINTMENT (OUTPATIENT)
Dept: RADIOLOGY | Facility: MEDICAL CENTER | Age: 46
End: 2021-07-10
Attending: EMERGENCY MEDICINE
Payer: MEDICAID

## 2021-07-10 ENCOUNTER — HOSPITAL ENCOUNTER (EMERGENCY)
Facility: MEDICAL CENTER | Age: 46
End: 2021-07-11
Attending: EMERGENCY MEDICINE
Payer: MEDICAID

## 2021-07-10 DIAGNOSIS — S09.90XA CLOSED HEAD INJURY, INITIAL ENCOUNTER: ICD-10-CM
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
## 2021-07-10 LAB
ANION GAP SERPL CALC-SCNC: 11 MMOL/L (ref 7–16)
APTT PPP: 32.7 SEC (ref 24.7–36)
BASOPHILS # BLD AUTO: 0.3 % (ref 0–1.8)
BASOPHILS # BLD: 0.03 K/UL (ref 0–0.12)
BUN SERPL-MCNC: 8 MG/DL (ref 8–22)
CALCIUM SERPL-MCNC: 9.3 MG/DL (ref 8.5–10.5)
CHLORIDE SERPL-SCNC: 100 MMOL/L (ref 96–112)
CO2 SERPL-SCNC: 29 MMOL/L (ref 20–33)
CREAT SERPL-MCNC: 1.09 MG/DL (ref 0.5–1.4)
EOSINOPHIL # BLD AUTO: 0.11 K/UL (ref 0–0.51)
EOSINOPHIL NFR BLD: 0.9 % (ref 0–6.9)
ERYTHROCYTE [DISTWIDTH] IN BLOOD BY AUTOMATED COUNT: 55.1 FL (ref 35.9–50)
GLUCOSE SERPL-MCNC: 102 MG/DL (ref 65–99)
HCT VFR BLD AUTO: 40.6 % (ref 37–47)
HGB BLD-MCNC: 12.2 G/DL (ref 12–16)
IMM GRANULOCYTES # BLD AUTO: 0.06 K/UL (ref 0–0.11)
IMM GRANULOCYTES NFR BLD AUTO: 0.5 % (ref 0–0.9)
INR PPP: 1.32 (ref 0.87–1.13)
LYMPHOCYTES # BLD AUTO: 1.77 K/UL (ref 1–4.8)
LYMPHOCYTES NFR BLD: 15.2 % (ref 22–41)
MCH RBC QN AUTO: 27.8 PG (ref 27–33)
MCHC RBC AUTO-ENTMCNC: 30 G/DL (ref 33.6–35)
MCV RBC AUTO: 92.5 FL (ref 81.4–97.8)
MONOCYTES # BLD AUTO: 0.54 K/UL (ref 0–0.85)
MONOCYTES NFR BLD AUTO: 4.6 % (ref 0–13.4)
NEUTROPHILS # BLD AUTO: 9.14 K/UL (ref 2–7.15)
NEUTROPHILS NFR BLD: 78.5 % (ref 44–72)
NRBC # BLD AUTO: 0 K/UL
NRBC BLD-RTO: 0 /100 WBC
PLATELET # BLD AUTO: 258 K/UL (ref 164–446)
PMV BLD AUTO: 10.8 FL (ref 9–12.9)
POTASSIUM SERPL-SCNC: 3.5 MMOL/L (ref 3.6–5.5)
PROTHROMBIN TIME: 16 SEC (ref 12–14.6)
RBC # BLD AUTO: 4.39 M/UL (ref 4.2–5.4)
SODIUM SERPL-SCNC: 140 MMOL/L (ref 135–145)
WBC # BLD AUTO: 11.7 K/UL (ref 4.8–10.8)

## 2021-07-10 PROCEDURE — 85025 COMPLETE CBC W/AUTO DIFF WBC: CPT

## 2021-07-10 PROCEDURE — 99284 EMERGENCY DEPT VISIT MOD MDM: CPT | Mod: 25

## 2021-07-10 PROCEDURE — 85730 THROMBOPLASTIN TIME PARTIAL: CPT

## 2021-07-10 PROCEDURE — 70450 CT HEAD/BRAIN W/O DYE: CPT

## 2021-07-10 PROCEDURE — 80048 BASIC METABOLIC PNL TOTAL CA: CPT

## 2021-07-10 PROCEDURE — 85610 PROTHROMBIN TIME: CPT

## 2021-07-10 PROCEDURE — 72125 CT NECK SPINE W/O DYE: CPT

## 2021-07-10 ASSESSMENT — FIBROSIS 4 INDEX: FIB4 SCORE: 0.62

## 2021-07-10 ASSESSMENT — PAIN DESCRIPTION - PAIN TYPE: TYPE: ACUTE PAIN

## 2021-07-11 VITALS
HEIGHT: 61 IN | RESPIRATION RATE: 19 BRPM | WEIGHT: 280 LBS | BODY MASS INDEX: 52.87 KG/M2 | OXYGEN SATURATION: 89 % | HEART RATE: 75 BPM | TEMPERATURE: 98.5 F | DIASTOLIC BLOOD PRESSURE: 59 MMHG | SYSTOLIC BLOOD PRESSURE: 122 MMHG

## 2021-07-11 NOTE — ED PROVIDER NOTES
ER Provider Note     Scribed for Rodeny Otero M.D. by Jesse Montero. 7/10/2021, 9:29 PM.    Primary Care Provider: SHANKAR Gleason  Means of Arrival: EMS   History obtained from: EMS  History limited by: None     CHIEF COMPLAINT  Chief Complaint   Patient presents with    GLF     BIBA from home after patient's legs gave out while she was in the bathroom and she hit the right side of her head on the sink. No LOC. Takes coumadin. c/o HA, blurred vision and right face numbness. AAOx4       HPI  Lorene Haro is a 45 y.o. female who presents to the Emergency Department via EMS for evaluation of a TBI. According to EMS, she had a fall at 5:30 PM today where she hit the right side of her head. She was not immediately taken to the hospital, but was told to call EMS if her symptoms worsen. She called EMS after she was experiencing nausea, and numbness to the left side of her face. She denies any loss of consciousness. No alleviating or exacerbating factors noted. She notes taking Warfarin.    REVIEW OF SYSTEMS  See HPI for further details. All other systems are negative.     PAST MEDICAL HISTORY   has a past medical history of Asthma, Bipolar 2 disorder (HCC), Chickenpox, Chronic obstructive pulmonary disease (HCC), Hypertension, Other psychological stress, Schizophrenic disorder (HCC), and Yeast infection of the skin (2021).    SURGICAL HISTORY   has a past surgical history that includes hysterectomy laparoscopy; colectomy; cholecystectomy; and knee arthroscopy (Left).    SOCIAL HISTORY  Social History     Tobacco Use    Smoking status: Current Every Day Smoker     Packs/day: 1.00     Years: 20.00     Pack years: 20.00     Types: Cigarettes     Last attempt to quit: 2020     Years since quittin.5    Smokeless tobacco: Never Used    Tobacco comment: 1 cigarette a day   Vaping Use    Vaping Use: Never used   Substance Use Topics    Alcohol use: No    Drug use: No      Social History  "    Substance and Sexual Activity   Drug Use No       FAMILY HISTORY  Family History   Problem Relation Age of Onset    No Known Problems Mother     Cancer Sister        CURRENT MEDICATIONS  Home Medications       Reviewed by Kamilah Barros R.N. (Registered Nurse) on 07/10/21 at 2145  Med List Status: Partial     Medication Last Dose Status   acetaminophen (TYLENOL) 500 MG Tab  Active   albuterol 108 (90 Base) MCG/ACT Aero Soln inhalation aerosol  Active   aripiprazole (ABILIFY) 30 MG tablet  Active   atorvastatin (LIPITOR) 20 MG Tab  Active   fluticasone (FLONASE) 50 MCG/ACT nasal spray  Active   furosemide (LASIX) 20 MG Tab  Active   Home Care Oxygen  Active   lithium CR (LITHOBID) 300 MG Tab CR  Active   metoprolol SR (TOPROL XL) 25 MG TABLET SR 24 HR  Active   montelukast (SINGULAIR) 10 MG Tab  Active   nitroglycerin (NITROSTAT) 0.4 MG SL Tab  Active   nystatin (MYCOSTATIN) powder  Active   potassium chloride SA (KDUR) 20 MEQ Tab CR  Active   tiotropium (SPIRIVA HANDIHALER) 18 MCG Cap  Active   warfarin (COUMADIN) 6 MG Tab  Active                    ALLERGIES  Allergies   Allergen Reactions    Amoxicillin Rash and Itching     Tolerates cephalosporins, pt reports that she gets a rash all over her body and gets itchy    Penicillin G Rash and Itching     pt reports that she gets a rash all over her body and gets itchy       PHYSICAL EXAM  VITAL SIGNS: /76   Pulse 75   Temp 37.1 °C (98.8 °F) (Temporal)   Resp 19   Ht 1.549 m (5' 1\")   Wt (!) 127 kg (280 lb)   LMP  (LMP Unknown)   SpO2 93%   BMI 52.91 kg/m²      Constitutional: No obvious traumas. Alert and oriented.  HENT: No signs of trauma, Bilateral external ears normal, Nose normal.   Eyes: Pupils are equal and reactive, Conjunctiva normal, Non-icteric.   Neck: Normal range of motion, No tenderness, Supple, No stridor.   Lymphatic: No lymphadenopathy noted.   Cardiovascular: Regular rate and rhythm, no palpable thrill  Thorax & Lungs: No " respiratory distress,  No chest tenderness.   Abdomen: Bowel sounds normal, Soft, No tenderness, No masses, No pulsatile masses. No peritoneal signs.  Skin: Warm, Dry, No erythema, No rash.   Back: No bony tenderness, No CVA tenderness.   Extremities: Intact distal pulses, No edema, No tenderness, No cyanosis.  Musculoskeletal: Good range of motion in all major joints. No tenderness to palpation or major deformities noted.   Neurologic: Alert , Normal motor function, Normal sensory function, No focal deficits noted. Moves all extremities.  Psychiatric: Affect normal, Judgment normal, Mood normal.     DIAGNOSTIC STUDIES / PROCEDURES    LABS  Labs Reviewed   CBC WITH DIFFERENTIAL - Abnormal; Notable for the following components:       Result Value    WBC 11.7 (*)     MCHC 30.0 (*)     RDW 55.1 (*)     Neutrophils-Polys 78.50 (*)     Lymphocytes 15.20 (*)     Neutrophils (Absolute) 9.14 (*)     All other components within normal limits    Narrative:     Indicate which anticoagulants the patient is on:->UNKNOWN   BASIC METABOLIC PANEL - Abnormal; Notable for the following components:    Potassium 3.5 (*)     Glucose 102 (*)     All other components within normal limits    Narrative:     Indicate which anticoagulants the patient is on:->UNKNOWN   PROTHROMBIN TIME - Abnormal; Notable for the following components:    PT 16.0 (*)     INR 1.32 (*)     All other components within normal limits    Narrative:     Indicate which anticoagulants the patient is on:->UNKNOWN   ESTIMATED GFR - Abnormal; Notable for the following components:    GFR If Non  54 (*)     All other components within normal limits    Narrative:     Indicate which anticoagulants the patient is on:->UNKNOWN   APTT    Narrative:     Indicate which anticoagulants the patient is on:->UNKNOWN     All labs reviewed by me.    RADIOLOGY  CT-HEAD W/O   Final Result         1. No acute intracranial abnormality. No evidence of acute intracranial  hemorrhage or mass lesion.               CT-CSPINE WITHOUT PLUS RECONS   Final Result         1. No acute fracture from C1 through T1 is visualized.            The radiologist's interpretation of all radiological studies have been reviewed by me.    COURSE & MEDICAL DECISION MAKING  Pertinent Labs & Imaging studies reviewed. (See chart for details)    This is a 45 y.o. female that presents with a ground-level fall striking her head.  The patient is on Coumadin.  We will get imaging of her brain.  I do not believe this is a syncope.  We will also get coagulation studies and will image her neck..     9:29 PM - Patient seen and examined at bedside. Ordered CT-CSpine without, CT-Head w/o, CBC with diff., BMP, APTT, and INR.     11:20 PM - Patient was reevaluated at bedside. Discussed lab and radiology results with the patient and informed them that they are reassuring. Discussed discharge instructions and return precautions with the patient and they were cleared for discharge. Patient was given the opportunity to ask any further questions. She is comfortable with discharge at this time.       The patient will return for new or worsening symptoms and is stable at the time of discharge.    X-rays of her head and neck are negative.  She does have an INR that is not therapeutic.  Patient has no significant electrolyte derangements and a mild stress leukocytosis.  At this time we can discharge the patient home with strict return precautions and follow-up.    The patient is referred to a primary physician for blood pressure management, diabetic screening, and for all other preventative health concerns.      DISPOSITION:  Patient will be discharged home in stable condition.    FOLLOW UP:  SHANKAR Gleason  [address] [place] [id number] [phone]    In 2 days      FINAL IMPRESSION  1. Fall, initial encounter    2. Closed head injury, initial encounter          I, Jesse Montero (Scribe), am scribing for, and in  the presence of, Rodney Otero M.D..    Electronically signed by: Jesse Montero (Scribe), 7/10/2021    I, Rodney Otero M.D. personally performed the services described in this documentation, as scribed by Jesse Montero in my presence, and it is both accurate and complete.     The note accurately reflects work and decisions made by me.  Rodney Otero M.D.  7/11/2021  3:55 AM     C

## 2021-07-11 NOTE — ED TRIAGE NOTES
Chief Complaint   Patient presents with   • GLF     BIBA from home after patient's legs gave out while she was in the bathroom and she hit the right side of her head on the sink. No LOC. Takes coumadin. c/o HA, blurred vision and right face numbness. AAOx4       Patient TBI alert on arrival. Taken straight to CT scan with RN on monitor.

## 2021-07-11 NOTE — ED NOTES
DC instructions reviewed with pt. Pt verbalized understanding. Pt ambulated to [facility] with steady gait.

## 2021-07-12 ENCOUNTER — HOME CARE VISIT (OUTPATIENT)
Dept: HOME HEALTH SERVICES | Facility: HOME HEALTHCARE | Age: 46
End: 2021-07-12
Payer: MEDICAID

## 2021-07-12 PROCEDURE — G0156 HHCP-SVS OF AIDE,EA 15 MIN: HCPCS

## 2021-07-13 ENCOUNTER — APPOINTMENT (OUTPATIENT)
Dept: RADIOLOGY | Facility: MEDICAL CENTER | Age: 46
End: 2021-07-13
Attending: EMERGENCY MEDICINE
Payer: MEDICAID

## 2021-07-13 ENCOUNTER — HOSPITAL ENCOUNTER (EMERGENCY)
Facility: MEDICAL CENTER | Age: 46
End: 2021-07-13
Attending: EMERGENCY MEDICINE
Payer: MEDICAID

## 2021-07-13 VITALS
DIASTOLIC BLOOD PRESSURE: 55 MMHG | RESPIRATION RATE: 18 BRPM | OXYGEN SATURATION: 100 % | TEMPERATURE: 97.5 F | HEIGHT: 62 IN | SYSTOLIC BLOOD PRESSURE: 106 MMHG | HEART RATE: 72 BPM | WEIGHT: 284.17 LBS | BODY MASS INDEX: 52.29 KG/M2

## 2021-07-13 VITALS — RESPIRATION RATE: 16 BRPM | HEART RATE: 71 BPM

## 2021-07-13 DIAGNOSIS — S16.1XXA STRAIN OF NECK MUSCLE, INITIAL ENCOUNTER: ICD-10-CM

## 2021-07-13 DIAGNOSIS — M54.2 NECK PAIN: ICD-10-CM

## 2021-07-13 PROCEDURE — 96372 THER/PROPH/DIAG INJ SC/IM: CPT

## 2021-07-13 PROCEDURE — A9270 NON-COVERED ITEM OR SERVICE: HCPCS | Performed by: EMERGENCY MEDICINE

## 2021-07-13 PROCEDURE — 700111 HCHG RX REV CODE 636 W/ 250 OVERRIDE (IP): Performed by: EMERGENCY MEDICINE

## 2021-07-13 PROCEDURE — 99284 EMERGENCY DEPT VISIT MOD MDM: CPT

## 2021-07-13 PROCEDURE — 700102 HCHG RX REV CODE 250 W/ 637 OVERRIDE(OP): Performed by: EMERGENCY MEDICINE

## 2021-07-13 PROCEDURE — 72050 X-RAY EXAM NECK SPINE 4/5VWS: CPT

## 2021-07-13 RX ORDER — DIAZEPAM 5 MG/1
5 TABLET ORAL ONCE
Status: COMPLETED | OUTPATIENT
Start: 2021-07-13 | End: 2021-07-13

## 2021-07-13 RX ORDER — KETOROLAC TROMETHAMINE 30 MG/ML
60 INJECTION, SOLUTION INTRAMUSCULAR; INTRAVENOUS ONCE
Status: COMPLETED | OUTPATIENT
Start: 2021-07-13 | End: 2021-07-13

## 2021-07-13 RX ADMIN — DIAZEPAM 5 MG: 5 TABLET ORAL at 17:07

## 2021-07-13 RX ADMIN — KETOROLAC TROMETHAMINE 60 MG: 30 INJECTION, SOLUTION INTRAMUSCULAR; INTRAVENOUS at 17:08

## 2021-07-13 ASSESSMENT — FIBROSIS 4 INDEX: FIB4 SCORE: 0.66

## 2021-07-13 ASSESSMENT — PAIN DESCRIPTION - PAIN TYPE: TYPE: ACUTE PAIN

## 2021-07-13 ASSESSMENT — LIFESTYLE VARIABLES: DO YOU DRINK ALCOHOL: NO

## 2021-07-13 NOTE — ED NOTES
Pt ambulatory to yellow 56 with 4 wheel walker. Reports new neck pain since fall. Pain rated 9/10 per pt. Chart up for ERP.

## 2021-07-14 NOTE — ED NOTES
Pt complaining of SOB when falling asleep. HOB raised, O2 saturation noted to be 93% when sleeping.

## 2021-07-14 NOTE — ED NOTES
Pt medicated with pain medications per MAR. Neck pain currently at 9/10. Pt undressed for MRI and repositioned for comfort. Currently sitting up in bed. MRI screening complete. Updated on POC. Call light within reach. All needs met at this time.

## 2021-07-14 NOTE — ED NOTES
Pt returned from MRI.  Unable to do MRI and ERP was made aware.  Pt now awaiting x-ray.  Updated on POC, all needs met.

## 2021-07-14 NOTE — ED NOTES
Patient discharged in stable condition per orders. Patient verbalized understanding of all discharge instructions. All belongings accounted for. Pt ambulatory to lobby with steady gait.

## 2021-07-14 NOTE — ED PROVIDER NOTES
ED Provider Note        Primary care provider: SHANKAR Gleason    I verified that the patient was wearing a mask and I was wearing appropriate PPE every time I entered the room. The patient's mask was on the patient at all times during my encounter except for a brief view of the oropharynx.      CHIEF COMPLAINT  Chief Complaint   Patient presents with   • Neck Pain     Pt reports continued neck pain since fall on 7/10. Seen in ER for same.        HPI  Lorene Haro is a 45 y.o. female who presents to the Emergency Department with chief complaint of neck pain.  Patient was seen here 3 days prior after a fall.  At that time she had CT head and CT C-spine which were negative for acute abnormality.  She was discharged has been taking Tylenol for pain control she reports that today she has had worsening of the pain left greater than right but having pain bilaterally and feels as though she has shooting pain into her shoulders and into her arms with any movement of her neck.  No chest pain or shortness of breath she reports no headache or altered mental status no fevers chills no abdominal pain no problems with urination or bowel movements the pain is rated as moderate no other alleviating factors no other acute symptoms or concerns.    REVIEW OF SYSTEMS  10 systems reviewed and otherwise negative, pertinent positives and negatives listed in the history of present illness.    PAST MEDICAL HISTORY   has a past medical history of Asthma, Bipolar 2 disorder (HCC), Chickenpox, Chronic obstructive pulmonary disease (HCC), Hypertension, Other psychological stress, Schizophrenic disorder (HCC), and Yeast infection of the skin (4/1/2021).    SURGICAL HISTORY   has a past surgical history that includes hysterectomy laparoscopy; colectomy; cholecystectomy; and knee arthroscopy (Left).    SOCIAL HISTORY  Social History     Tobacco Use   • Smoking status: Current Every Day Smoker     Packs/day: 0.10     Years: 20.00     Pack  "years: 2.00     Types: Cigarettes     Last attempt to quit: 2020     Years since quittin.5   • Smokeless tobacco: Never Used   • Tobacco comment: 1 cigarette a day   Vaping Use   • Vaping Use: Never used   Substance Use Topics   • Alcohol use: No   • Drug use: No      Social History     Substance and Sexual Activity   Drug Use No       FAMILY HISTORY  Non-Contributory    CURRENT MEDICATIONS  Home Medications     Reviewed by Michell Robles R.N. (Registered Nurse) on 21 at 1658  Med List Status: Partial   Medication Last Dose Status   acetaminophen (TYLENOL) 500 MG Tab  Active   albuterol 108 (90 Base) MCG/ACT Aero Soln inhalation aerosol  Active   aripiprazole (ABILIFY) 30 MG tablet  Active   atorvastatin (LIPITOR) 20 MG Tab  Active   fluticasone (FLONASE) 50 MCG/ACT nasal spray  Active   furosemide (LASIX) 20 MG Tab  Active   Home Care Oxygen  Active   lithium CR (LITHOBID) 300 MG Tab CR  Active   metoprolol SR (TOPROL XL) 25 MG TABLET SR 24 HR  Active   montelukast (SINGULAIR) 10 MG Tab  Active   nitroglycerin (NITROSTAT) 0.4 MG SL Tab  Active   nystatin (MYCOSTATIN) powder  Active   potassium chloride SA (KDUR) 20 MEQ Tab CR  Active   tiotropium (SPIRIVA HANDIHALER) 18 MCG Cap  Active   warfarin (COUMADIN) 6 MG Tab  Active                ALLERGIES  Allergies   Allergen Reactions   • Amoxicillin Rash and Itching     Tolerates cephalosporins, pt reports that she gets a rash all over her body and gets itchy   • Penicillin G Rash and Itching     pt reports that she gets a rash all over her body and gets itchy       PHYSICAL EXAM  VITAL SIGNS: /69   Pulse 82   Temp 36.4 °C (97.5 °F) (Temporal)   Resp 14   Ht 1.575 m (5' 2\")   Wt (!) 129 kg (284 lb 2.8 oz)   LMP  (LMP Unknown)   SpO2 96%   BMI 51.98 kg/m²   Pulse ox interpretation: I interpret this pulse ox as normal.  Constitutional: Alert and oriented x 3, minimal distress  HEENT: Atraumatic normocephalic, pupils are equal round, " extraocular movements are intact. The nares is clear, external ears are normal, mouth shows moist mucous membranes  Neck: Tender midline no palpable step-off pain radiates in the cervical distribution bilaterally into the trapezius  Cardiovascular: Regular rate and rhythm no murmur rub or gallop   Thorax & Lungs: No respiratory distress, no wheezes rales or rhonchi, No chest tenderness.   GI: Soft nontender nondistended positive bowel sounds, no peritoneal signs  Skin: Warm dry no obvious acute rash or lesion  Musculoskeletal: Moving all extremities with normal range strength, no acute  deformity  Neurologic: Cranial nerves III through XII are grossly intact, no sensory deficit, no cerebellar dysfunction, negative Uday sign bilaterally 2+ DTRs of bilateral biceps tendons normal cap refill sensation and  strength in both hands.  Psychiatric: Appropriate affect for situation at this time      DIAGNOSTIC STUDIES / PROCEDURES  LABS      RADIOLOGY  DX-CERVICAL SPINE-4+ VIEWS   Final Result      1.  No acute fracture or listhesis in the cervical spine. No listhesis at rest or with flexion/extension.   2.  No evidence of atlantoaxial instability.        The radiologist's interpretation of all radiological studies have been reviewed by me.    COURSE & MEDICAL DECISION MAKING  Pertinent Labs & Imaging studies reviewed. (See chart for details)    5:02 PM - Patient seen and examined at bedside.       Patient noted to have slightly elevated blood pressure likely circumstantial secondary to presenting complaint. Referred to primary care physician for further evaluation.      Medical Decision Making: Recent trauma with negative CT ongoing pain and reports minimal cervical radiculopathy.  I had ordered a new MRI of the cervical spine to evaluate for any ligamentous injury with low suspicion however the patient was too large for the MRI machine.  To demonstrate stability I opted to obtain flexion-extension views of the  "cervical spine these are unremarkable patient is doing better in the ER she is given instruction to return for worsening pain numbness tingling weakness any other acute symptoms or concerns otherwise discharged in stable and improved condition.    /69   Pulse 82   Temp 36.4 °C (97.5 °F) (Temporal)   Resp 14   Ht 1.575 m (5' 2\")   Wt (!) 129 kg (284 lb 2.8 oz)   LMP  (LMP Unknown)   SpO2 96%   BMI 51.98 kg/m²     Sheridan Dinh, A.P.N.  [address] [facility] [facility] [id number] [phone]    In 2 days  if symptoms persist    [facility], Emergency Dept  [address] [id number] [phone]    in 12-24 hours if symptoms persist, immediately If symptoms worsen, or if you develop any other symptoms or concerns      Discharge Medication List as of 7/13/2021  8:09 PM          FINAL IMPRESSION  1. Neck pain    2. Strain of neck muscle, initial encounter           This dictation has been created using voice recognition software and/or scribes. The accuracy of the dictation is limited by the abilities of the software and the expertise of the scribes. I expect there may be some errors of grammar and possibly content. I made every attempt to manually correct the errors within my dictation. However, errors related to voice recognition software and/or scribes may still exist and should be interpreted within the appropriate context.            "

## 2021-07-14 NOTE — ED NOTES
Pt resting supine in gurney. States pain feels better at the moment. Pt appears lethargic and falls asleep quickly after conversation ends. VS remain stable. Updated on POC. All needs met at this time.

## 2021-07-16 ENCOUNTER — HOME CARE VISIT (OUTPATIENT)
Dept: HOME HEALTH SERVICES | Facility: HOME HEALTHCARE | Age: 46
End: 2021-07-16
Payer: MEDICAID

## 2021-07-16 ENCOUNTER — DOCUMENTATION (OUTPATIENT)
Dept: MEDICAL GROUP | Facility: PHYSICIAN GROUP | Age: 46
End: 2021-07-16

## 2021-07-16 ENCOUNTER — ANTICOAGULATION MONITORING (OUTPATIENT)
Dept: MEDICAL GROUP | Facility: PHYSICIAN GROUP | Age: 46
End: 2021-07-16

## 2021-07-16 VITALS
TEMPERATURE: 97.5 F | HEART RATE: 70 BPM | OXYGEN SATURATION: 97 % | SYSTOLIC BLOOD PRESSURE: 96 MMHG | DIASTOLIC BLOOD PRESSURE: 62 MMHG | RESPIRATION RATE: 16 BRPM

## 2021-07-16 DIAGNOSIS — I48.0 PAROXYSMAL A-FIB (HCC): ICD-10-CM

## 2021-07-16 LAB
INR PPP: 1.5 (ref 2–3.5)
INR PPP: 1.5 - CRITICAL LOW: < 0.8, CRITICAL HIGH: > 6.5 (ref 2–3.5)

## 2021-07-16 PROCEDURE — G0493 RN CARE EA 15 MIN HH/HOSPICE: HCPCS

## 2021-07-16 SDOH — ECONOMIC STABILITY: HOUSING INSECURITY
HOME SAFETY: PT HAS NO BATHROOM GRAB BARS (PT CLAIMS APARTMENT MANAGER IS NOT WILLING TO INSTALL AT THIS TIME) CLUTTERED LIVING AREA (PT IS ATTEMPTING TO CLEAN), EXTENDED OXYGEN TUBING THAT CREATES A POTENTIAL RISK AS A TRIP/SLIP HAZARD.    OXYGEN SAFETY RISK ASS

## 2021-07-16 SDOH — ECONOMIC STABILITY: HOUSING INSECURITY
HOME SAFETY: ESSMENT PERFORMED. PATIENT PT WAS GIVEN A NO SMOKING SIGN AND PROVIDED EDUCATION ABOUT WHY IT IS IMPORTANT TO PLACE ONE. PATIENT DOES HAVE A WORKING FIRE EXTINGUISHER PRESENT IN THE HOME. SMOKE ALARMS ARE PRESENT AND FUNCTIONAL ON EACH LEVEL OF THE H

## 2021-07-16 SDOH — ECONOMIC STABILITY: HOUSING INSECURITY: EVIDENCE OF SMOKING MATERIAL: 1

## 2021-07-16 SDOH — ECONOMIC STABILITY: HOUSING INSECURITY
HOME SAFETY: OME. PATIENT DOES HAVE A FIRE ESCAPE PLAN DEVELOPED. PATIENT DOES NOT HAVE FLAMMABLE MATERIALS PRESENT IN THE HOME PRESENTING A FIRE HAZARD. EVIDENCE FOUND OF SMOKING MATERIALS PRESENT IN THE HOME.

## 2021-07-16 ASSESSMENT — ENCOUNTER SYMPTOMS
SHORTNESS OF BREATH: T
SEVERE DYSPNEA: 1
POOR JUDGMENT: 1
VOMITING: DENIES

## 2021-07-16 ASSESSMENT — ACTIVITIES OF DAILY LIVING (ADL): OASIS_M1830: 02

## 2021-07-16 ASSESSMENT — PATIENT HEALTH QUESTIONNAIRE - PHQ9: CLINICAL INTERPRETATION OF PHQ2 SCORE: 0

## 2021-07-16 NOTE — PROGRESS NOTES
OP Telephone Anticoagulation Service Note    Date: 2021      Anticoagulation Summary  As of 2021    INR goal:  2.0-3.0   TTR:  21.5 % (3 mo)   INR used for dosin.50 (2021)   Warfarin maintenance plan:  9 mg (6 mg x 1.5) every Sun; 6 mg (6 mg x 1) all other days   Weekly warfarin total:  45 mg   Plan last modified:  Kinjal Gant PharmD (6/10/2021)   Next INR check:  2021   Target end date:  Indefinite    Indications    Paroxysmal A-fib (HCC) [I48.0]             Anticoagulation Episode Summary     INR check location:      Preferred lab:      Send INR reminders to:      Comments:  [facility]         Anticoagulation Patient Findings      INR SUB-therapeutic at 1.5.  Left voicemail message for pt.  Verified regimen.  Instructed pt to bolus x 1 dose w/ 12 mg and to then continue on w/ her current regimen.  Told pt to call if any s/s of bleeding or medication changes.  Check INR in 2 day(s).  Instructed pt to call clinic at [phone] if there are any questions.    Orders faxed to [facility].    Chris Kang PharmD

## 2021-07-16 NOTE — PROGRESS NOTES
Medication chart review for [facility] services    PCP:  SHANKAR Gleason  [address] [address]  Fax: [phone]    Current medication list     Current Outpatient Medications:   •  famotidine, 40 mg, Oral, DAILY AT 1800  •  Home Care Oxygen, 2 L/min, Inhalation, Continuous  •  warfarin, 6-9 mg, Oral, DAILY  •  nitroglycerin, 0.4 mg, Sublingual, PRN  •  albuterol, 2 Puff, Inhalation, Q6HRS PRN  •  metoprolol SR, 25 mg, Oral, DAILY (Patient not taking: Reported on 7/16/2021)  •  acetaminophen, 1,000 mg, Oral, Q6HRS PRN  •  furosemide, 20 mg, Oral, BID  •  potassium chloride SA, 20 mEq, Oral, DAILY  •  nystatin, Apply underneath breasts and on inner thigh area twice a day for fungal infection  •  montelukast, 10 mg, Oral, QHS  •  atorvastatin, 20 mg, Oral, DAILY  •  Spiriva HandiHaler, 18 mcg, Inhalation, DAILY  •  aripiprazole, 30 mg, Oral, QAM    Allergies   Allergen Reactions   • Amoxicillin Rash and Itching     Tolerates cephalosporins, pt reports that she gets a rash all over her body and gets itchy   • Penicillin G Rash and Itching     pt reports that she gets a rash all over her body and gets itchy       Labs     Lab Results   Component Value Date/Time    SODIUM 140 07/10/2021 09:58 PM    POTASSIUM 3.5 (L) 07/10/2021 09:58 PM    CHLORIDE 100 07/10/2021 09:58 PM    CO2 29 07/10/2021 09:58 PM    GLUCOSE 102 (H) 07/10/2021 09:58 PM    BUN 8 07/10/2021 09:58 PM    CREATININE 1.09 07/10/2021 09:58 PM    CREATININE 0.9 09/11/2008 01:45 PM      Lab Results   Component Value Date/Time    ALKPHOSPHAT 122 (H) 07/03/2021 07:50 PM    ASTSGOT 13 07/03/2021 07:50 PM    ALTSGPT 13 07/03/2021 07:50 PM    TBILIRUBIN 0.9 07/03/2021 07:50 PM    ALBUMIN 4.0 07/03/2021 07:50 PM    INR 1.50 (A) 07/16/2021 11:20 AM          Assessment and Plan:   • Received referral from [facility]. Medications reviewed.         Shaw Read, PharmD, MS, BCACP, [facility]  Phone  [phone] fax [phone]    This note was created using voice recognition software (Dragon). The accuracy of the dictation is limited by the abilities of the software. I have reviewed the note prior to signing, however some errors in grammar and context are still possible. If you have any questions related to this note please do not hesitate to contact our office.

## 2021-07-17 ENCOUNTER — HOSPITAL ENCOUNTER (EMERGENCY)
Facility: MEDICAL CENTER | Age: 46
End: 2021-07-17
Attending: EMERGENCY MEDICINE
Payer: MEDICAID

## 2021-07-17 ENCOUNTER — APPOINTMENT (OUTPATIENT)
Dept: RADIOLOGY | Facility: MEDICAL CENTER | Age: 46
End: 2021-07-17
Payer: MEDICAID

## 2021-07-17 VITALS
WEIGHT: 284.39 LBS | TEMPERATURE: 98.2 F | BODY MASS INDEX: 52.33 KG/M2 | SYSTOLIC BLOOD PRESSURE: 110 MMHG | RESPIRATION RATE: 21 BRPM | HEART RATE: 78 BPM | OXYGEN SATURATION: 98 % | HEIGHT: 62 IN | DIASTOLIC BLOOD PRESSURE: 89 MMHG

## 2021-07-17 DIAGNOSIS — R07.9 CHEST PAIN, UNSPECIFIED TYPE: ICD-10-CM

## 2021-07-17 LAB
ALBUMIN SERPL BCP-MCNC: 3.9 G/DL (ref 3.2–4.9)
ALBUMIN/GLOB SERPL: 1.4 G/DL
ALP SERPL-CCNC: 101 U/L (ref 30–99)
ALT SERPL-CCNC: 12 U/L (ref 2–50)
ANION GAP SERPL CALC-SCNC: 14 MMOL/L (ref 7–16)
AST SERPL-CCNC: 14 U/L (ref 12–45)
BASOPHILS # BLD AUTO: 0.2 % (ref 0–1.8)
BASOPHILS # BLD: 0.02 K/UL (ref 0–0.12)
BILIRUB SERPL-MCNC: 0.4 MG/DL (ref 0.1–1.5)
BUN SERPL-MCNC: 14 MG/DL (ref 8–22)
CALCIUM SERPL-MCNC: 8.8 MG/DL (ref 8.5–10.5)
CHLORIDE SERPL-SCNC: 103 MMOL/L (ref 96–112)
CO2 SERPL-SCNC: 24 MMOL/L (ref 20–33)
CREAT SERPL-MCNC: 1 MG/DL (ref 0.5–1.4)
EKG IMPRESSION: NORMAL
EKG IMPRESSION: NORMAL
EOSINOPHIL # BLD AUTO: 0.15 K/UL (ref 0–0.51)
EOSINOPHIL NFR BLD: 1.3 % (ref 0–6.9)
ERYTHROCYTE [DISTWIDTH] IN BLOOD BY AUTOMATED COUNT: 54.2 FL (ref 35.9–50)
GLOBULIN SER CALC-MCNC: 2.8 G/DL (ref 1.9–3.5)
GLUCOSE SERPL-MCNC: 180 MG/DL (ref 65–99)
HCT VFR BLD AUTO: 37.4 % (ref 37–47)
HGB BLD-MCNC: 11.3 G/DL (ref 12–16)
IMM GRANULOCYTES # BLD AUTO: 0.06 K/UL (ref 0–0.11)
IMM GRANULOCYTES NFR BLD AUTO: 0.5 % (ref 0–0.9)
INR PPP: 1.36 (ref 0.87–1.13)
LYMPHOCYTES # BLD AUTO: 1.98 K/UL (ref 1–4.8)
LYMPHOCYTES NFR BLD: 17.3 % (ref 22–41)
MCH RBC QN AUTO: 27.8 PG (ref 27–33)
MCHC RBC AUTO-ENTMCNC: 30.2 G/DL (ref 33.6–35)
MCV RBC AUTO: 92.1 FL (ref 81.4–97.8)
MONOCYTES # BLD AUTO: 0.48 K/UL (ref 0–0.85)
MONOCYTES NFR BLD AUTO: 4.2 % (ref 0–13.4)
NEUTROPHILS # BLD AUTO: 8.73 K/UL (ref 2–7.15)
NEUTROPHILS NFR BLD: 76.5 % (ref 44–72)
NRBC # BLD AUTO: 0 K/UL
NRBC BLD-RTO: 0 /100 WBC
NT-PROBNP SERPL IA-MCNC: 257 PG/ML (ref 0–125)
PLATELET # BLD AUTO: 230 K/UL (ref 164–446)
PMV BLD AUTO: 11.6 FL (ref 9–12.9)
POTASSIUM SERPL-SCNC: 3.5 MMOL/L (ref 3.6–5.5)
PROT SERPL-MCNC: 6.7 G/DL (ref 6–8.2)
PROTHROMBIN TIME: 16.4 SEC (ref 12–14.6)
RBC # BLD AUTO: 4.06 M/UL (ref 4.2–5.4)
SODIUM SERPL-SCNC: 141 MMOL/L (ref 135–145)
TROPONIN T SERPL-MCNC: 23 NG/L (ref 6–19)
TROPONIN T SERPL-MCNC: 26 NG/L (ref 6–19)
WBC # BLD AUTO: 11.4 K/UL (ref 4.8–10.8)

## 2021-07-17 PROCEDURE — 85610 PROTHROMBIN TIME: CPT

## 2021-07-17 PROCEDURE — A9270 NON-COVERED ITEM OR SERVICE: HCPCS | Performed by: EMERGENCY MEDICINE

## 2021-07-17 PROCEDURE — 80053 COMPREHEN METABOLIC PANEL: CPT

## 2021-07-17 PROCEDURE — 93005 ELECTROCARDIOGRAM TRACING: CPT | Performed by: EMERGENCY MEDICINE

## 2021-07-17 PROCEDURE — 700102 HCHG RX REV CODE 250 W/ 637 OVERRIDE(OP): Performed by: EMERGENCY MEDICINE

## 2021-07-17 PROCEDURE — 85025 COMPLETE CBC W/AUTO DIFF WBC: CPT

## 2021-07-17 PROCEDURE — 71045 X-RAY EXAM CHEST 1 VIEW: CPT

## 2021-07-17 PROCEDURE — 84484 ASSAY OF TROPONIN QUANT: CPT | Mod: 91

## 2021-07-17 PROCEDURE — 99285 EMERGENCY DEPT VISIT HI MDM: CPT

## 2021-07-17 PROCEDURE — 83880 ASSAY OF NATRIURETIC PEPTIDE: CPT

## 2021-07-17 RX ORDER — ACETAMINOPHEN 500 MG
1000 TABLET ORAL ONCE
Status: COMPLETED | OUTPATIENT
Start: 2021-07-17 | End: 2021-07-17

## 2021-07-17 RX ORDER — METHOCARBAMOL 500 MG/1
500 TABLET, FILM COATED ORAL ONCE
Status: COMPLETED | OUTPATIENT
Start: 2021-07-17 | End: 2021-07-17

## 2021-07-17 RX ADMIN — METHOCARBAMOL 500 MG: 500 TABLET ORAL at 07:01

## 2021-07-17 RX ADMIN — ACETAMINOPHEN 1000 MG: 500 TABLET ORAL at 01:41

## 2021-07-17 ASSESSMENT — HEART SCORE
HISTORY: SLIGHTLY SUSPICIOUS
ECG: NORMAL
RISK FACTORS: 1-2 RISK FACTORS
TROPONIN: LESS THAN OR EQUAL TO NORMAL LIMIT
AGE: 45-64
HEART SCORE: 2

## 2021-07-17 ASSESSMENT — PAIN DESCRIPTION - PAIN TYPE: TYPE: ACUTE PAIN

## 2021-07-17 ASSESSMENT — PAIN DESCRIPTION - DESCRIPTORS: DESCRIPTORS: SHARP

## 2021-07-17 ASSESSMENT — FIBROSIS 4 INDEX: FIB4 SCORE: 0.66

## 2021-07-17 NOTE — ED PROVIDER NOTES
ED Provider Note     7/17/2021  12:36 AM    Means of Arrival: Walk In  History obtained by: patient  Limitations: none  PCP: Sheridan Dinh  CODE STATUS: Full    CHIEF COMPLAINT  Chief Complaint   Patient presents with   • Chest Pain     Per EMS report, pt has had sharp, nonradiating chest pain since 0900. Pt rates pain 8/10. Pt reports hx of CHF, afib and COPD. Pt amb to to [facility]. Pt in gown and hooked up to monitor. Chart up for ERP.       HPI  Lorene Haro is a 45 y.o. female history of CHF, COPD, says she takes warfarin for blood clots, history of paroxysmal atrial fibrillation also history who presents with concerns of retrosternal chest pain.  Symptoms started over 12 hours ago.  She says pain is mostly constant and sharp.  No vomiting.  No nausea.  She has some shortness of breath and wears 2 L nasal cannula oxygen at home.  She has noticed that her legs are more swollen and is becoming increasingly difficult to ambulate.  She says that she is compliant with her medications.  She continues to smoke cigarettes.  Denies any past coronary stenting or procedures.    Last hospitalization 6/26/2021 for chest pain.  Troponins were negative.  Chest x-ray did not have acute findings.  EKG without dynamic changes or ischemic changes.  Pain thought to be of musculoskeletal etiology at that time.  Last nuclear stress test was January 2021.    REVIEW OF SYSTEMS  Review of Systems   All other systems reviewed and are negative.    See HPI for further details.    PAST MEDICAL HISTORY   has a past medical history of Asthma, Bipolar 2 disorder (HCC), Chickenpox, Chronic obstructive pulmonary disease (HCC), Hypertension, Other psychological stress, Schizophrenic disorder ([facility]), and Yeast infection of the skin (4/1/2021).    SOCIAL HISTORY  Social History     Tobacco Use   • Smoking status: Current Every Day Smoker     Packs/day: 0.10     Years: 20.00     Pack years: 2.00     Types: Cigarettes     Last attempt to quit:  "2020     Years since quittin.5   • Smokeless tobacco: Never Used   • Tobacco comment: 1 cigarette a day   Vaping Use   • Vaping Use: Never used   Substance and Sexual Activity   • Alcohol use: No   • Drug use: No   • Sexual activity: Not on file       SURGICAL HISTORY   has a past surgical history that includes hysterectomy laparoscopy; colectomy; cholecystectomy; and knee arthroscopy (Left).    CURRENT MEDICATIONS  Home Medications    **Home medications have not yet been reviewed for this encounter**         ALLERGIES  Allergies   Allergen Reactions   • Amoxicillin Rash and Itching     Tolerates cephalosporins, pt reports that she gets a rash all over her body and gets itchy   • Penicillin G Rash and Itching     pt reports that she gets a rash all over her body and gets itchy       PHYSICAL EXAM  VITAL SIGNS: /59   Pulse 77   Temp 37.4 °C (99.4 °F) (Temporal)   Resp 20   Ht 1.575 m (5' 2\")   Wt (!) 129 kg (284 lb 6.3 oz)   LMP  (LMP Unknown)   SpO2 97%   BMI 52.02 kg/m²     Pulse ox interpretation:I interpret this pulse ox as normal.  Constitutional: 45-year-old woman, increased BMI body habitus, appears to be in mild discomfort.  HENT: No signs of trauma, Bilateral external ears normal, Nose normal.   Eyes: Pupils are equal, Conjunctiva normal, Non-icteric.   Neck: Normal range of motion, No tenderness, Supple, No stridor.  No JVD.  Cardiovascular: Regular rate and rhythm, no murmurs. Symmetric distal pulses. No cyanosis of extremities.  Mild edema in peripheral extremities.  Chronic skin changes due to edema.  Thorax & Lungs: Normal breath sounds, No respiratory distress, No wheezing, No chest tenderness.   Abdomen:  Soft, No tenderness, No masses, No pulsatile masses. No peritoneal signs.  Skin: Warm, Dry, No erythema, No rash.   Back: No midline bony tenderness, No CVA tenderness.   Musculoskeletal: Good range of motion in all major joints. No tenderness to palpation or major deformities " noted. Well perfused lower extremities with palpable pulses.   Neurologic: Alert , Normal motor function, Normal sensory function, No focal deficits noted.   Psychiatric: Affect normal, Judgment normal, Mood normal.   Physical Exam      DIAGNOSTIC STUDIES / PROCEDURES    EKG  Results for orders placed or performed during the hospital encounter of 21   EKG   Result Value Ref Range    Report       [facility]    Test Date:  2021  Pt Name:    ADELINA MILLAN                Department: ER  MRN:        [id number]                      Room:       [facility]  Gender:     Female                       Technician: [id number]  :        1975                   Requested By:ER TRIAGE PROTOCOL  Order #:    [id number]                    Reading MD: Barry Campbell II, MD    Measurements  Intervals                                Axis  Rate:       83                           P:          64  SC:         136                          QRS:        45  QRSD:       94                           T:          12  QT:         368  QTc:        433    Interpretive Statements  SINUS RHYTHM  Rate 83  Normal intervals  Approximately 2 mm elevation ST segment in V2 but otherwise no other ST  elevations or depressions.  Impression: Sinus rhythm EKG with slight ST elevation in V2 but not  consistent  with STEMI.  Compared to ECG 2021 22:53:58  Sinus bradycardia no longer  present  Atrial premature complex(es) no longer present  Electronically Signed On 2021 1:01:41 PDT by Barry Campbell II, MD           LABS  Pertinent Labs & Imaging studies reviewed. (See chart for details)    RADIOLOGY  Pertinent Labs & Imaging studies reviewed. (See chart for details)    COURSE & MEDICAL DECISION MAKING  Pertinent Labs & Imaging studies reviewed. (See chart for details)    12:36 AM This is an emergent evaluation of a  45 y.o. female with multiple medical problems including obesity, atrial fibrillation, CHF,  COPD continues to smoke who presents with concerns of chest pain for the past 12 hours.  I have concerns for possible ACS, heart failure exacerbation, musculoskeletal pain.  Will check INR since she is taking warfarin.  Defer aspirin here in the ER given warfarin use.  Will give Tylenol for pain.  If INR subtherapeutic will give aspirin and evaluate warfarin changes or possibly hospitalization for bridging.  For chest pain protocol    I have ordered continuous pulse ox and cardiac monitoring. There are no abnormalities. Pulse ox shows a normal waveform with saturations of 92%. Cardiac monitors show normal sinus rhythm with a normal rate in the 80s to 90s.    5:22 AM  Troponin x2 unchanged.  Reviewed multiple prior troponins and today's level appears to be at baseline.  Chest x-ray unremarkable.  Slightly low potassium and this should easily correct with regular diet.  proBNP 257 which is near baseline.  INR subtherapeutic 1.36.  I did review chart and it appears that she takes anticoagulation for paroxysmal atrial fibrillation.  She is established in anticoagulation clinic.  I have asked her to contact clinic on Monday to discuss changes to medications.  I have very low suspicion for pulmonary embolism.  She has many ER visits in the past, she has had 7 CT scans in the past year.  She is also had multiple ultrasounds.  No evidence of thromboembolic disease.  Heart score today is 2.    Given low dose of robaxin prior to discharge, she asked for something for leg cramping.     The patient will return for worsening symptoms and is stable at the time of discharge. The patient verbalizes understanding. Guidance was provided on appropriate use of medications including driving under the influence, overdose, and side effects.         FINAL IMPRESSION    ICD-10-CM   1. Chest pain, unspecified type  R07.9            This dictation was created using voice recognition software. The accuracy of the dictation is limited to the  abilities of the software. I expect there may be some errors of grammar and possibly content. The nursing notes were reviewed and certain aspects of this information were incorporated into this note.    Electronically signed by: Barry Campbell II, M.D., 7/17/2021 12:36 AM

## 2021-07-17 NOTE — ED TRIAGE NOTES
"Chief Complaint   Patient presents with   • Chest Pain     Per EMS report, pt has had sharp, nonradiating chest pain since 0900. Pt rates pain 8/10. Pt reports hx of CHF, afib and COPD. Pt amb to to [facility]. Pt in gown and hooked up to monitor. Chart up for ERP.     /69   Pulse 90   Temp 37.4 °C (99.4 °F) (Temporal)   Resp 18   Ht 1.575 m (5' 2\")   Wt (!) 129 kg (284 lb 6.3 oz)   LMP  (LMP Unknown)   SpO2 91%   BMI 52.02 kg/m²     "

## 2021-07-18 ENCOUNTER — HOSPITAL ENCOUNTER (EMERGENCY)
Facility: MEDICAL CENTER | Age: 46
End: 2021-07-18
Attending: EMERGENCY MEDICINE
Payer: MEDICAID

## 2021-07-18 ENCOUNTER — APPOINTMENT (OUTPATIENT)
Dept: RADIOLOGY | Facility: MEDICAL CENTER | Age: 46
End: 2021-07-18
Attending: EMERGENCY MEDICINE
Payer: MEDICAID

## 2021-07-18 VITALS
RESPIRATION RATE: 18 BRPM | BODY MASS INDEX: 52.26 KG/M2 | SYSTOLIC BLOOD PRESSURE: 130 MMHG | HEIGHT: 62 IN | OXYGEN SATURATION: 99 % | WEIGHT: 284 LBS | HEART RATE: 87 BPM | TEMPERATURE: 97.4 F | DIASTOLIC BLOOD PRESSURE: 59 MMHG

## 2021-07-18 DIAGNOSIS — J06.9 UPPER RESPIRATORY TRACT INFECTION, UNSPECIFIED TYPE: ICD-10-CM

## 2021-07-18 LAB
ANION GAP SERPL CALC-SCNC: 5 MMOL/L (ref 7–16)
ANISOCYTOSIS BLD QL SMEAR: ABNORMAL
APTT PPP: 32.8 SEC (ref 24.7–36)
BASOPHILS # BLD AUTO: 0 % (ref 0–1.8)
BASOPHILS # BLD: 0 K/UL (ref 0–0.12)
BUN SERPL-MCNC: 7 MG/DL (ref 8–22)
CALCIUM SERPL-MCNC: 8.9 MG/DL (ref 8.5–10.5)
CHLORIDE SERPL-SCNC: 110 MMOL/L (ref 96–112)
CO2 SERPL-SCNC: 30 MMOL/L (ref 20–33)
CREAT SERPL-MCNC: 0.87 MG/DL (ref 0.5–1.4)
DACRYOCYTES BLD QL SMEAR: NORMAL
EOSINOPHIL # BLD AUTO: 0.16 K/UL (ref 0–0.51)
EOSINOPHIL NFR BLD: 1.7 % (ref 0–6.9)
ERYTHROCYTE [DISTWIDTH] IN BLOOD BY AUTOMATED COUNT: 54.5 FL (ref 35.9–50)
GLUCOSE SERPL-MCNC: 94 MG/DL (ref 65–99)
HCT VFR BLD AUTO: 42.9 % (ref 37–47)
HGB BLD-MCNC: 12.5 G/DL (ref 12–16)
HYPOCHROMIA BLD QL SMEAR: ABNORMAL
INR PPP: 1.35 (ref 0.87–1.13)
LYMPHOCYTES # BLD AUTO: 0.83 K/UL (ref 1–4.8)
LYMPHOCYTES NFR BLD: 8.7 % (ref 22–41)
MACROCYTES BLD QL SMEAR: ABNORMAL
MANUAL DIFF BLD: NORMAL
MCH RBC QN AUTO: 27.4 PG (ref 27–33)
MCHC RBC AUTO-ENTMCNC: 29.1 G/DL (ref 33.6–35)
MCV RBC AUTO: 93.9 FL (ref 81.4–97.8)
MICROCYTES BLD QL SMEAR: ABNORMAL
MONOCYTES # BLD AUTO: 0.67 K/UL (ref 0–0.85)
MONOCYTES NFR BLD AUTO: 7 % (ref 0–13.4)
MORPHOLOGY BLD-IMP: NORMAL
NEUTROPHILS # BLD AUTO: 7.85 K/UL (ref 2–7.15)
NEUTROPHILS NFR BLD: 82.6 % (ref 44–72)
NRBC # BLD AUTO: 0 K/UL
NRBC BLD-RTO: 0 /100 WBC
NT-PROBNP SERPL IA-MCNC: 345 PG/ML (ref 0–125)
OVALOCYTES BLD QL SMEAR: NORMAL
PLATELET # BLD AUTO: 226 K/UL (ref 164–446)
PLATELET BLD QL SMEAR: NORMAL
PMV BLD AUTO: 11.1 FL (ref 9–12.9)
POIKILOCYTOSIS BLD QL SMEAR: NORMAL
POTASSIUM SERPL-SCNC: 4.4 MMOL/L (ref 3.6–5.5)
PROTHROMBIN TIME: 16.3 SEC (ref 12–14.6)
RBC # BLD AUTO: 4.57 M/UL (ref 4.2–5.4)
RBC BLD AUTO: PRESENT
SODIUM SERPL-SCNC: 145 MMOL/L (ref 135–145)
WBC # BLD AUTO: 9.5 K/UL (ref 4.8–10.8)

## 2021-07-18 PROCEDURE — 83880 ASSAY OF NATRIURETIC PEPTIDE: CPT

## 2021-07-18 PROCEDURE — 80048 BASIC METABOLIC PNL TOTAL CA: CPT

## 2021-07-18 PROCEDURE — 99284 EMERGENCY DEPT VISIT MOD MDM: CPT | Mod: 25

## 2021-07-18 PROCEDURE — 36415 COLL VENOUS BLD VENIPUNCTURE: CPT

## 2021-07-18 PROCEDURE — 85007 BL SMEAR W/DIFF WBC COUNT: CPT

## 2021-07-18 PROCEDURE — 85027 COMPLETE CBC AUTOMATED: CPT

## 2021-07-18 PROCEDURE — 85730 THROMBOPLASTIN TIME PARTIAL: CPT

## 2021-07-18 PROCEDURE — 85610 PROTHROMBIN TIME: CPT

## 2021-07-18 PROCEDURE — 71045 X-RAY EXAM CHEST 1 VIEW: CPT

## 2021-07-18 ASSESSMENT — FIBROSIS 4 INDEX: FIB4 SCORE: 0.83

## 2021-07-18 ASSESSMENT — LIFESTYLE VARIABLES: DO YOU DRINK ALCOHOL: NO

## 2021-07-18 NOTE — ED TRIAGE NOTES
"Chief Complaint   Patient presents with   • Cough     pt bib [facility] from home c/o coughing blood since yesterday describes as \"dark red blood\" pt on warfarin   • Nasal Congestion   • Head Ache     c/o sinus head ache since yesterday     Pt uses 02@ 2l/nc at all times. Pt able to speak in full sentences. Placed in senior lounge room. Mask applied for the pt.   "

## 2021-07-19 ENCOUNTER — HOME CARE VISIT (OUTPATIENT)
Dept: HOME HEALTH SERVICES | Facility: HOME HEALTHCARE | Age: 46
End: 2021-07-19
Payer: MEDICAID

## 2021-07-19 ENCOUNTER — TELEPHONE (OUTPATIENT)
Dept: VASCULAR LAB | Facility: MEDICAL CENTER | Age: 46
End: 2021-07-19

## 2021-07-19 ENCOUNTER — ANTICOAGULATION MONITORING (OUTPATIENT)
Dept: MEDICAL GROUP | Facility: MEDICAL CENTER | Age: 46
End: 2021-07-19

## 2021-07-19 ENCOUNTER — HOSPITAL ENCOUNTER (EMERGENCY)
Facility: MEDICAL CENTER | Age: 46
End: 2021-07-19
Attending: EMERGENCY MEDICINE
Payer: MEDICAID

## 2021-07-19 VITALS
HEART RATE: 72 BPM | HEIGHT: 62 IN | BODY MASS INDEX: 52.33 KG/M2 | WEIGHT: 284.39 LBS | DIASTOLIC BLOOD PRESSURE: 66 MMHG | OXYGEN SATURATION: 100 % | SYSTOLIC BLOOD PRESSURE: 120 MMHG | TEMPERATURE: 98 F | RESPIRATION RATE: 20 BRPM

## 2021-07-19 VITALS
DIASTOLIC BLOOD PRESSURE: 53 MMHG | OXYGEN SATURATION: 98 % | TEMPERATURE: 98 F | SYSTOLIC BLOOD PRESSURE: 93 MMHG | RESPIRATION RATE: 18 BRPM | HEART RATE: 74 BPM

## 2021-07-19 DIAGNOSIS — R09.81 SINUS CONGESTION: ICD-10-CM

## 2021-07-19 DIAGNOSIS — I48.0 PAROXYSMAL A-FIB (HCC): ICD-10-CM

## 2021-07-19 LAB
INR PPP: 1.5 (ref 2–3.5)
INR PPP: 1.5 - CRITICAL LOW: < 0.8, CRITICAL HIGH: > 6.5 (ref 2–3.5)

## 2021-07-19 PROCEDURE — 99283 EMERGENCY DEPT VISIT LOW MDM: CPT

## 2021-07-19 PROCEDURE — 665003 FOLLOW UP-HOME HEALTH

## 2021-07-19 PROCEDURE — G0299 HHS/HOSPICE OF RN EA 15 MIN: HCPCS

## 2021-07-19 RX ORDER — FEXOFENADINE HCL AND PSEUDOEPHEDRINE HCI 60; 120 MG/1; MG/1
1 TABLET, EXTENDED RELEASE ORAL 2 TIMES DAILY
Qty: 20 TABLET | Refills: 0 | Status: SHIPPED | OUTPATIENT
Start: 2021-07-19 | End: 2021-08-07

## 2021-07-19 RX ORDER — SODIUM CHLORIDE 9 MG/ML
1000 INJECTION, SOLUTION INTRAVENOUS ONCE
Status: DISCONTINUED | OUTPATIENT
Start: 2021-07-19 | End: 2021-07-19

## 2021-07-19 ASSESSMENT — ENCOUNTER SYMPTOMS
VOMITING: DENIES
NAUSEA: DENIES

## 2021-07-19 ASSESSMENT — FIBROSIS 4 INDEX: FIB4 SCORE: 0.84

## 2021-07-19 NOTE — ED TRIAGE NOTES
"Pt reassessed.  Pt stated \"I just don't feel well.\"  Apologized to pt for wait time, aware waiting for available room.  Pt remains in 2L oxygen at this time.  Oxygen tank reassessed, 2.5hrs remaining.    "

## 2021-07-19 NOTE — ED PROVIDER NOTES
"ED Provider Note    CHIEF COMPLAINT  Chief Complaint   Patient presents with   • Cough     pt bib [facility]sa from home c/o coughing blood since yesterday describes as \"dark red blood\" pt on warfarin   • Nasal Congestion   • Head Ache     c/o sinus head ache since yesterday       HPI  Lorenesamina Haro is a 45 y.o. female who presents to the emergency room complaining of persistent cough, nasal congestion. Past medical history as documented below. Patient was seen here yesterday for chest pain. Today stated that she wanted further clarification and workup regarding the above complaint. No other significant changes from yesterday. Denies any chest pain today.    REVIEW OF SYSTEMS  See HPI for further details. All other systems are negative.     PAST MEDICAL HISTORY   has a past medical history of Asthma, Bipolar 2 disorder (HCC), Chickenpox, Chronic obstructive pulmonary disease (HCC), Hypertension, Other psychological stress, Schizophrenic disorder (HCC), and Yeast infection of the skin (2021).    SOCIAL HISTORY  Social History     Tobacco Use   • Smoking status: Current Every Day Smoker     Packs/day: 0.10     Years: 20.00     Pack years: 2.00     Types: Cigarettes     Last attempt to quit: 2020     Years since quittin.5   • Smokeless tobacco: Never Used   • Tobacco comment: 1 cigarette a day   Vaping Use   • Vaping Use: Never used   Substance and Sexual Activity   • Alcohol use: No   • Drug use: No   • Sexual activity: Not on file       SURGICAL HISTORY   has a past surgical history that includes hysterectomy laparoscopy; colectomy; cholecystectomy; and knee arthroscopy (Left).    CURRENT MEDICATIONS  Home Medications     Reviewed by Rosamaria Lowe R.N. (Registered Nurse) on 21 at 1639  Med List Status: Partial   Medication Last Dose Status   acetaminophen (TYLENOL) 500 MG Tab  Active   albuterol 108 (90 Base) MCG/ACT Aero Soln inhalation aerosol  Active   aripiprazole (ABILIFY) 30 MG tablet  Active " "  atorvastatin (LIPITOR) 20 MG Tab  Active   famotidine (PEPCID) 40 MG Tab  Active   furosemide (LASIX) 20 MG Tab  Active   Home Care Oxygen  Active   metoprolol SR (TOPROL XL) 25 MG TABLET SR 24 HR  Active   montelukast (SINGULAIR) 10 MG Tab  Active   nitroglycerin (NITROSTAT) 0.4 MG SL Tab  Active   nystatin (MYCOSTATIN) powder  Active   potassium chloride SA (KDUR) 20 MEQ Tab CR  Active   tiotropium (SPIRIVA HANDIHALER) 18 MCG Cap  Active   warfarin (COUMADIN) 6 MG Tab  Active                ALLERGIES  Allergies   Allergen Reactions   • Amoxicillin Rash and Itching     Tolerates cephalosporins, pt reports that she gets a rash all over her body and gets itchy   • Penicillin G Rash and Itching     pt reports that she gets a rash all over her body and gets itchy       PHYSICAL EXAM  VITAL SIGNS: /58   Pulse 82   Temp 36.1 °C (96.9 °F) (Temporal)   Resp 20   Ht 1.575 m (5' 2\")   Wt (!) 129 kg (284 lb)   LMP  (LMP Unknown)   SpO2 99%   BMI 51.94 kg/m²  @[email or web address][[id number]::@  Pulse ox interpretation: I interpret this pulse ox as normal.  Constitutional: Alert in no apparent distress.  HENT: Normocephalic, Atraumatic, Bilateral external ears normal. Nose normal. Nasal congestion with mild maxillary sinus tenderness.  Eyes: Pupils are equal and reactive.  Heart: Regular rate and rythm, no murmurs.    Lungs: Clear to auscultation bilaterally.  Skin: Warm, Dry, No erythema, No rash.   Neurologic: Alert, Grossly non-focal.   Psychiatric: psychiatric affect no SI HI      Results for orders placed or performed during the hospital encounter of 07/18/21   CBC WITH DIFFERENTIAL   Result Value Ref Range    WBC 9.5 4.8 - 10.8 K/uL    RBC 4.57 4.20 - 5.40 M/uL    Hemoglobin 12.5 12.0 - 16.0 g/dL    Hematocrit 42.9 37.0 - 47.0 %    MCV 93.9 81.4 - 97.8 fL    MCH 27.4 27.0 - 33.0 pg    MCHC 29.1 (L) 33.6 - 35.0 g/dL    RDW 54.5 (H) 35.9 - 50.0 fL    Platelet Count 226 164 - 446 K/uL    MPV 11.1 9.0 - 12.9 fL    " Neutrophils-Polys 82.60 (H) 44.00 - 72.00 %    Lymphocytes 8.70 (L) 22.00 - 41.00 %    Monocytes 7.00 0.00 - 13.40 %    Eosinophils 1.70 0.00 - 6.90 %    Basophils 0.00 0.00 - 1.80 %    Nucleated RBC 0.00 /100 WBC    Neutrophils (Absolute) 7.85 (H) 2.00 - 7.15 K/uL    Lymphs (Absolute) 0.83 (L) 1.00 - 4.80 K/uL    Monos (Absolute) 0.67 0.00 - 0.85 K/uL    Eos (Absolute) 0.16 0.00 - 0.51 K/uL    Baso (Absolute) 0.00 0.00 - 0.12 K/uL    NRBC (Absolute) 0.00 K/uL    Hypochromia 1+     Anisocytosis 1+     Macrocytosis 1+     Microcytosis 1+    BASIC METABOLIC PANEL   Result Value Ref Range    Sodium 145 135 - 145 mmol/L    Potassium 4.4 3.6 - 5.5 mmol/L    Chloride 110 96 - 112 mmol/L    Co2 30 20 - 33 mmol/L    Glucose 94 65 - 99 mg/dL    Bun 7 (L) 8 - 22 mg/dL    Creatinine 0.87 0.50 - 1.40 mg/dL    Calcium 8.9 8.5 - 10.5 mg/dL    Anion Gap 5.0 (L) 7.0 - 16.0   proBrain Natriuretic Peptide, NT   Result Value Ref Range    NT-proBNP 345 (H) 0 - 125 pg/mL   PROTHROMBIN TIME   Result Value Ref Range    PT 16.3 (H) 12.0 - 14.6 sec    INR 1.35 (H) 0.87 - 1.13   APTT   Result Value Ref Range    APTT 32.8 24.7 - 36.0 sec   ESTIMATED GFR   Result Value Ref Range    GFR If African American >60 >60 mL/min/1.73 m 2    GFR If Non African American >60 >60 mL/min/1.73 m 2   DIFFERENTIAL MANUAL   Result Value Ref Range    Manual Diff Status PERFORMED    PERIPHERAL SMEAR REVIEW   Result Value Ref Range    Peripheral Smear Review see below    PLATELET ESTIMATE   Result Value Ref Range    Plt Estimation Normal    MORPHOLOGY   Result Value Ref Range    RBC Morphology Present     Poikilocytosis 1+     Ovalocytes 1+     Tear Drop Cells 1+      DX-CHEST-PORTABLE (1 VIEW)   Final Result      Cardiomegaly.          COURSE & MEDICAL DECISION MAKING  Pertinent Labs & Imaging studies reviewed. (See chart for details)  patient presented back to the emergency department with persistent complaint of nasal congestion. History as above. Strong  suspicion for viral URI. Repeat workup otherwise is document above is unremarkable. Chest x-ray negative other than persistent cardiomegaly. Laboratory evaluation without significant change. ACS evaluation was completed yesterday and patient is not complaining of any chest pain today. Primary complaining of sinus congestion and cough. Likely from post nasal drip. Will have her continue with conservative care measures over-the-counter at home and following up with her primary care physician team.       The patient will return for worsening symptoms and is stable at the time of discharge. The patient verbalizes understanding and will comply.    FINAL IMPRESSION  1. Upper respiratory tract infection, unspecified type               Electronically signed by: Juan Ann M.D., 7/18/2021 7:28 PM

## 2021-07-19 NOTE — TELEPHONE ENCOUNTER
[facility]    Unable to reach pt by phone today.  Additionally they may be at the ER at the current moment.     Will try again tomorrow.     Ciro Mohamud, PharmD

## 2021-07-19 NOTE — PROGRESS NOTES
[facility]     Unable to reach pt by phone today.  Additionally they may be at the ER at the current moment.      Will try again tomorrow.      Ciro Mohamud, PharmD

## 2021-07-19 NOTE — ED TRIAGE NOTES
"Chief Complaint   Patient presents with   • Congestion     Pt states continues to have congestions and feels short of breath     /67   Pulse 81   Temp 36.1 °C (97 °F) (Temporal)   Resp 20   Ht 1.575 m (5' 2\")   Wt (!) 129 kg (284 lb 6.3 oz)   LMP  (LMP Unknown)   SpO2 96%   BMI 52.02 kg/m²     Pt to ED via [facility] for c/o congestion and shortness of breath.  Pt states Dx w/ URI and continues to have congestion.  Pt denies c/o CP, abd pain or back pain.  Pt uses 2L oxygen, placed on 2L oxygen via NC w/ full tank O2 in Triage.      "

## 2021-07-20 NOTE — ED PROVIDER NOTES
ED Provider Note    ED Provider Note    Primary care provider: SHANKAR Gleason  Means of arrival: EMS  History obtained from: Patient    CHIEF COMPLAINT  Chief Complaint   Patient presents with   • Congestion     Pt states continues to have congestions and feels short of breath     Seen at 6:19 PM.   HPI  Lorene Haro is a 45 y.o. female who presents to the Emergency Department for nasal congestion.  The patient has had 2 days of nasal congestion.  She denies any headache, fevers, chills, chest pain or shortness of breath.  She does feel tired on occasion and would like to take naps more than usual.  She does not appear to be the best historian.    REVIEW OF SYSTEMS  See HPI,   Remainder of ROS negative.     PAST MEDICAL HISTORY   has a past medical history of Asthma, Bipolar 2 disorder (HCC), Chickenpox, Chronic obstructive pulmonary disease (HCC), Hypertension, On home oxygen therapy, Other psychological stress, Schizophrenic disorder (HCC), and Yeast infection of the skin (2021).    SURGICAL HISTORY   has a past surgical history that includes hysterectomy laparoscopy; colectomy; cholecystectomy; and knee arthroscopy (Left).    SOCIAL HISTORY  Social History     Tobacco Use   • Smoking status: Current Every Day Smoker     Packs/day: 0.10     Years: 20.00     Pack years: 2.00     Types: Cigarettes     Last attempt to quit: 2020     Years since quittin.5   • Smokeless tobacco: Never Used   Vaping Use   • Vaping Use: Never used   Substance Use Topics   • Alcohol use: No   • Drug use: No      Social History     Substance and Sexual Activity   Drug Use No       FAMILY HISTORY  Family History   Problem Relation Age of Onset   • No Known Problems Mother    • Cancer Sister        CURRENT MEDICATIONS  Reviewed.  See Encounter Summary.     ALLERGIES  Allergies   Allergen Reactions   • Amoxicillin Rash and Itching     Tolerates cephalosporins, pt reports that she gets a rash all over her body and gets  "itchy   • Penicillin G Rash and Itching     pt reports that she gets a rash all over her body and gets itchy       PHYSICAL EXAM  VITAL SIGNS: /66   Pulse 72   Temp 36.7 °C (98 °F) (Temporal)   Resp 20   Ht 1.575 m (5' 2\")   Wt (!) 129 kg (284 lb 6.3 oz)   LMP  (LMP Unknown)   SpO2 100%   BMI 52.02 kg/m²   Constitutional: Awake, alert in no apparent distress.  Patient sounds nasally congested.  Significantly elevated BMI.  HENT: Normocephalic, Bilateral external ears normal. Nose normal.  Moderate rhinorrhea.  Eyes: Conjunctiva normal, non-icteric, EOMI.    Thorax & Lungs: Easy unlabored respirations, Clear to ascultation bilaterally.  Cardiovascular: Regular rate, Regular rhythm, No murmurs, rubs or gallops. Bilateral pulses symmetrical.   Abdomen:  Soft, nontender, nondistended, normal active bowel sounds.   :    Skin: Visualized skin is  Dry, No erythema, No rash.   Musculoskeletal:   No cyanosis, clubbing or edema. No leg asymmetry.  Reactive erythema on the anterior shins bilaterally, appears chronic.  Neurologic: Alert, Grossly non-focal.   Psychiatric: Normal affect, Normal mood  Lymphatic:  No cervical LAD        RADIOLOGY  No orders to display         COURSE & MEDICAL DECISION MAKING  Pertinent Labs & Imaging studies reviewed. (See chart for details)        6:19 PM - Medical record reviewed, patient is in the emergency department frequently.  She has a history of anxiety, typically presents with chest pain.  She has been admitted several times for chest pain work-ups, stress test earlier this year.  History of paroxysmal atrial fibrillation on Coumadin.    Decision Making:  This is a pleasant 45 y.o. year old female who presents with nasal congestion.  She does not have any other acute complaints today.  She does not report any shortness of breath to me.  She simply has some nasal congestion that is lasted for about 48 hours.  Vitals are reassuring and at her baseline.  She is on 2 L nasal " cannula chronically and oxygen on this is 98 to 100% on room air.  The patient has had multiple evaluations in the past week alone.  I see no indication to repeat the laboratory work-up she had earlier this week.    The patient will be discharged with pseudoephedrine for nasal congestion and I do recommend she obtain a primary care follow-up.    Discharge Medications:  Discharge Medication List as of 7/19/2021  6:32 PM      START taking these medications    Details   fexofenadine-pseudoephedrine (ALLEGRA-D)  MG per tablet Take 1 tablet by mouth 2 times a day., Disp-20 tablet, R-0, Normal             The patient was discharged home (see d/c instructions) was told to return immediately for any signs or symptoms listed, or any worsening at all.  The patient verbally agreed to the discharge precautions and follow-up plan which is documented in EPIC.        FINAL IMPRESSION  1. Sinus congestion

## 2021-07-21 ENCOUNTER — HOME CARE VISIT (OUTPATIENT)
Dept: HOME HEALTH SERVICES | Facility: HOME HEALTHCARE | Age: 46
End: 2021-07-21
Payer: MEDICAID

## 2021-07-21 NOTE — PROGRESS NOTES
Attempted to contact pt regarding subherapeutic INR  However, unable to lvm as mb is full  Ordered INR check for [facility] to draw on 7/22  Will attempt to contact pt again later    Hilda GuzmanD

## 2021-07-21 NOTE — PROGRESS NOTES
Anticoagulation Summary  As of 2021    INR goal:  2.0-3.0   TTR:  20.7 % (3.2 mo)   INR used for dosin.50 (2021)   Warfarin maintenance plan:  9 mg (6 mg x 1.5) every Sun; 6 mg (6 mg x 1) all other days   Weekly warfarin total:  45 mg   Plan last modified:  Kinjal Gant PharmD (6/10/2021)   Next INR check:  2021   Target end date:  Indefinite    Indications    Paroxysmal A-fib (HCC) [I48.0]             Anticoagulation Episode Summary     INR check location:      Preferred lab:      Send INR reminders to:      Comments:  [facility]         Anticoagulation Patient Findings      Spoke with pt.  INR is sub therapeutic.   Pt denies any unusual s/s of bleeding, bruising, clotting or any changes to diet or medications. Denies any etoh, cranberries, supplements, or illness.   Pt verifies warfarin weekly dosing.     Will have pt continue with dose since we were unable to get a hold of her sooner.    Repeat INR in 1 day via [facility].     Kinjal Gant, HildaD

## 2021-07-21 NOTE — CASE COMMUNICATION
Quality Review for 7/16/21 Recertification OASIS by MARCEL Wyatt RN on  July 21, 2021      Edits completed by MARCEL Wyatt RN:  1. Updated MAR with oxygen instructions  2. Removed F2F information

## 2021-07-22 ENCOUNTER — HOSPITAL ENCOUNTER (INPATIENT)
Facility: MEDICAL CENTER | Age: 46
LOS: 5 days | DRG: 190 | End: 2021-07-27
Attending: EMERGENCY MEDICINE | Admitting: INTERNAL MEDICINE
Payer: MEDICAID

## 2021-07-22 ENCOUNTER — HOME CARE VISIT (OUTPATIENT)
Dept: HOME HEALTH SERVICES | Facility: HOME HEALTHCARE | Age: 46
End: 2021-07-22
Payer: MEDICAID

## 2021-07-22 ENCOUNTER — ANTICOAGULATION MONITORING (OUTPATIENT)
Dept: VASCULAR LAB | Facility: MEDICAL CENTER | Age: 46
End: 2021-07-22

## 2021-07-22 ENCOUNTER — APPOINTMENT (OUTPATIENT)
Dept: RADIOLOGY | Facility: MEDICAL CENTER | Age: 46
DRG: 190 | End: 2021-07-22
Attending: EMERGENCY MEDICINE
Payer: MEDICAID

## 2021-07-22 VITALS
SYSTOLIC BLOOD PRESSURE: 102 MMHG | DIASTOLIC BLOOD PRESSURE: 54 MMHG | TEMPERATURE: 98.1 F | HEART RATE: 74 BPM | RESPIRATION RATE: 28 BRPM | OXYGEN SATURATION: 92 %

## 2021-07-22 DIAGNOSIS — J44.9 OSA AND COPD OVERLAP SYNDROME (HCC): ICD-10-CM

## 2021-07-22 DIAGNOSIS — I48.0 PAROXYSMAL A-FIB (HCC): ICD-10-CM

## 2021-07-22 DIAGNOSIS — J96.22 ACUTE ON CHRONIC RESPIRATORY FAILURE WITH HYPOXIA AND HYPERCAPNIA (HCC): ICD-10-CM

## 2021-07-22 DIAGNOSIS — F31.9 BIPOLAR AFFECTIVE DISORDER, REMISSION STATUS UNSPECIFIED (HCC): ICD-10-CM

## 2021-07-22 DIAGNOSIS — E66.01 CLASS 3 SEVERE OBESITY DUE TO EXCESS CALORIES WITHOUT SERIOUS COMORBIDITY WITH BODY MASS INDEX (BMI) OF 45.0 TO 49.9 IN ADULT (HCC): ICD-10-CM

## 2021-07-22 DIAGNOSIS — G47.33 OSA AND COPD OVERLAP SYNDROME (HCC): ICD-10-CM

## 2021-07-22 DIAGNOSIS — Z51.81 SUBTHERAPEUTIC ANTICOAGULATION: ICD-10-CM

## 2021-07-22 DIAGNOSIS — D50.9 IRON DEFICIENCY ANEMIA, UNSPECIFIED IRON DEFICIENCY ANEMIA TYPE: ICD-10-CM

## 2021-07-22 DIAGNOSIS — J44.1 CHRONIC OBSTRUCTIVE PULMONARY DISEASE WITH ACUTE EXACERBATION (HCC): ICD-10-CM

## 2021-07-22 DIAGNOSIS — J20.9 ACUTE BRONCHITIS, UNSPECIFIED ORGANISM: ICD-10-CM

## 2021-07-22 DIAGNOSIS — J44.1 ACUTE EXACERBATION OF CHRONIC OBSTRUCTIVE PULMONARY DISEASE (COPD) (HCC): ICD-10-CM

## 2021-07-22 DIAGNOSIS — Z79.01 SUBTHERAPEUTIC ANTICOAGULATION: ICD-10-CM

## 2021-07-22 DIAGNOSIS — J96.21 ACUTE ON CHRONIC RESPIRATORY FAILURE WITH HYPOXIA AND HYPERCAPNIA (HCC): ICD-10-CM

## 2021-07-22 DIAGNOSIS — R53.81 DEBILITATED: ICD-10-CM

## 2021-07-22 LAB
ALBUMIN SERPL BCP-MCNC: 3.9 G/DL (ref 3.2–4.9)
ALBUMIN/GLOB SERPL: 1.4 G/DL
ALP SERPL-CCNC: 105 U/L (ref 30–99)
ALT SERPL-CCNC: 14 U/L (ref 2–50)
ANION GAP SERPL CALC-SCNC: 8 MMOL/L (ref 7–16)
AST SERPL-CCNC: 17 U/L (ref 12–45)
BASOPHILS # BLD AUTO: 0.2 % (ref 0–1.8)
BASOPHILS # BLD: 0.01 K/UL (ref 0–0.12)
BILIRUB SERPL-MCNC: 0.4 MG/DL (ref 0.1–1.5)
BUN SERPL-MCNC: 7 MG/DL (ref 8–22)
CALCIUM SERPL-MCNC: 8.7 MG/DL (ref 8.5–10.5)
CHLORIDE SERPL-SCNC: 107 MMOL/L (ref 96–112)
CO2 SERPL-SCNC: 32 MMOL/L (ref 20–33)
CREAT SERPL-MCNC: 0.92 MG/DL (ref 0.5–1.4)
EKG IMPRESSION: NORMAL
EOSINOPHIL # BLD AUTO: 0.06 K/UL (ref 0–0.51)
EOSINOPHIL NFR BLD: 1.3 % (ref 0–6.9)
ERYTHROCYTE [DISTWIDTH] IN BLOOD BY AUTOMATED COUNT: 56.5 FL (ref 35.9–50)
FLUAV RNA SPEC QL NAA+PROBE: NEGATIVE
FLUBV RNA SPEC QL NAA+PROBE: NEGATIVE
GLOBULIN SER CALC-MCNC: 2.7 G/DL (ref 1.9–3.5)
GLUCOSE SERPL-MCNC: 95 MG/DL (ref 65–99)
HCT VFR BLD AUTO: 40.1 % (ref 37–47)
HGB BLD-MCNC: 11.8 G/DL (ref 12–16)
IMM GRANULOCYTES # BLD AUTO: 0.03 K/UL (ref 0–0.11)
IMM GRANULOCYTES NFR BLD AUTO: 0.7 % (ref 0–0.9)
INR PPP: 1.65 (ref 0.87–1.13)
INR PPP: 1.8 - CRITICAL LOW: < 0.8, CRITICAL HIGH: > 6.5 (ref 2–3.5)
LYMPHOCYTES # BLD AUTO: 0.93 K/UL (ref 1–4.8)
LYMPHOCYTES NFR BLD: 20.4 % (ref 22–41)
MCH RBC QN AUTO: 28.2 PG (ref 27–33)
MCHC RBC AUTO-ENTMCNC: 29.4 G/DL (ref 33.6–35)
MCV RBC AUTO: 95.9 FL (ref 81.4–97.8)
MONOCYTES # BLD AUTO: 0.38 K/UL (ref 0–0.85)
MONOCYTES NFR BLD AUTO: 8.3 % (ref 0–13.4)
MORPHOLOGY BLD-IMP: NORMAL
NEUTROPHILS # BLD AUTO: 3.15 K/UL (ref 2–7.15)
NEUTROPHILS NFR BLD: 69.1 % (ref 44–72)
NRBC # BLD AUTO: 0 K/UL
NRBC BLD-RTO: 0 /100 WBC
NT-PROBNP SERPL IA-MCNC: 397 PG/ML (ref 0–125)
PLATELET # BLD AUTO: 210 K/UL (ref 164–446)
PMV BLD AUTO: 10.9 FL (ref 9–12.9)
POTASSIUM SERPL-SCNC: 4.3 MMOL/L (ref 3.6–5.5)
PROT SERPL-MCNC: 6.6 G/DL (ref 6–8.2)
PROTHROMBIN TIME: 19 SEC (ref 12–14.6)
RBC # BLD AUTO: 4.18 M/UL (ref 4.2–5.4)
RSV RNA SPEC QL NAA+PROBE: NEGATIVE
SARS-COV-2 RNA RESP QL NAA+PROBE: NOTDETECTED
SODIUM SERPL-SCNC: 147 MMOL/L (ref 135–145)
SPECIMEN SOURCE: NORMAL
TROPONIN T SERPL-MCNC: 23 NG/L (ref 6–19)
TROPONIN T SERPL-MCNC: 35 NG/L (ref 6–19)
WBC # BLD AUTO: 4.6 K/UL (ref 4.8–10.8)

## 2021-07-22 PROCEDURE — 700102 HCHG RX REV CODE 250 W/ 637 OVERRIDE(OP): Performed by: INTERNAL MEDICINE

## 2021-07-22 PROCEDURE — 80053 COMPREHEN METABOLIC PANEL: CPT

## 2021-07-22 PROCEDURE — A9270 NON-COVERED ITEM OR SERVICE: HCPCS | Performed by: EMERGENCY MEDICINE

## 2021-07-22 PROCEDURE — 71045 X-RAY EXAM CHEST 1 VIEW: CPT

## 2021-07-22 PROCEDURE — 700111 HCHG RX REV CODE 636 W/ 250 OVERRIDE (IP): Performed by: INTERNAL MEDICINE

## 2021-07-22 PROCEDURE — 99223 1ST HOSP IP/OBS HIGH 75: CPT | Performed by: INTERNAL MEDICINE

## 2021-07-22 PROCEDURE — 85610 PROTHROMBIN TIME: CPT

## 2021-07-22 PROCEDURE — 94640 AIRWAY INHALATION TREATMENT: CPT

## 2021-07-22 PROCEDURE — A9270 NON-COVERED ITEM OR SERVICE: HCPCS | Performed by: INTERNAL MEDICINE

## 2021-07-22 PROCEDURE — 700101 HCHG RX REV CODE 250: Performed by: INTERNAL MEDICINE

## 2021-07-22 PROCEDURE — 0241U HCHG SARS-COV-2 COVID-19 NFCT DS RESP RNA 4 TRGT MIC: CPT

## 2021-07-22 PROCEDURE — G0299 HHS/HOSPICE OF RN EA 15 MIN: HCPCS

## 2021-07-22 PROCEDURE — 83880 ASSAY OF NATRIURETIC PEPTIDE: CPT

## 2021-07-22 PROCEDURE — 93005 ELECTROCARDIOGRAM TRACING: CPT | Performed by: EMERGENCY MEDICINE

## 2021-07-22 PROCEDURE — 700101 HCHG RX REV CODE 250: Performed by: EMERGENCY MEDICINE

## 2021-07-22 PROCEDURE — C9803 HOPD COVID-19 SPEC COLLECT: HCPCS | Performed by: EMERGENCY MEDICINE

## 2021-07-22 PROCEDURE — 770001 HCHG ROOM/CARE - MED/SURG/GYN PRIV*

## 2021-07-22 PROCEDURE — 36415 COLL VENOUS BLD VENIPUNCTURE: CPT

## 2021-07-22 PROCEDURE — 84484 ASSAY OF TROPONIN QUANT: CPT | Mod: 91

## 2021-07-22 PROCEDURE — 85025 COMPLETE CBC W/AUTO DIFF WBC: CPT

## 2021-07-22 PROCEDURE — 700102 HCHG RX REV CODE 250 W/ 637 OVERRIDE(OP): Performed by: EMERGENCY MEDICINE

## 2021-07-22 PROCEDURE — 700111 HCHG RX REV CODE 636 W/ 250 OVERRIDE (IP): Performed by: EMERGENCY MEDICINE

## 2021-07-22 PROCEDURE — 99285 EMERGENCY DEPT VISIT HI MDM: CPT

## 2021-07-22 RX ORDER — ENALAPRILAT 1.25 MG/ML
1.25 INJECTION INTRAVENOUS EVERY 6 HOURS PRN
Status: DISCONTINUED | OUTPATIENT
Start: 2021-07-22 | End: 2021-07-27 | Stop reason: HOSPADM

## 2021-07-22 RX ORDER — IPRATROPIUM BROMIDE AND ALBUTEROL SULFATE 2.5; .5 MG/3ML; MG/3ML
6 SOLUTION RESPIRATORY (INHALATION) ONCE
Status: COMPLETED | OUTPATIENT
Start: 2021-07-22 | End: 2021-07-22

## 2021-07-22 RX ORDER — PROMETHAZINE HYDROCHLORIDE 25 MG/1
12.5-25 TABLET ORAL EVERY 4 HOURS PRN
Status: DISCONTINUED | OUTPATIENT
Start: 2021-07-22 | End: 2021-07-27 | Stop reason: HOSPADM

## 2021-07-22 RX ORDER — ACETAMINOPHEN 325 MG/1
650 TABLET ORAL EVERY 6 HOURS PRN
Status: DISCONTINUED | OUTPATIENT
Start: 2021-07-22 | End: 2021-07-27 | Stop reason: HOSPADM

## 2021-07-22 RX ORDER — FAMOTIDINE 20 MG/1
40 TABLET, FILM COATED ORAL DAILY
Status: DISCONTINUED | OUTPATIENT
Start: 2021-07-23 | End: 2021-07-27 | Stop reason: HOSPADM

## 2021-07-22 RX ORDER — ATORVASTATIN CALCIUM 20 MG/1
20 TABLET, FILM COATED ORAL DAILY
Status: DISCONTINUED | OUTPATIENT
Start: 2021-07-22 | End: 2021-07-27 | Stop reason: HOSPADM

## 2021-07-22 RX ORDER — LABETALOL HYDROCHLORIDE 5 MG/ML
10 INJECTION, SOLUTION INTRAVENOUS EVERY 4 HOURS PRN
Status: DISCONTINUED | OUTPATIENT
Start: 2021-07-22 | End: 2021-07-27 | Stop reason: HOSPADM

## 2021-07-22 RX ORDER — ONDANSETRON 4 MG/1
4 TABLET, ORALLY DISINTEGRATING ORAL EVERY 4 HOURS PRN
Status: DISCONTINUED | OUTPATIENT
Start: 2021-07-22 | End: 2021-07-27 | Stop reason: HOSPADM

## 2021-07-22 RX ORDER — AZITHROMYCIN 250 MG/1
500 TABLET, FILM COATED ORAL ONCE
Status: COMPLETED | OUTPATIENT
Start: 2021-07-22 | End: 2021-07-22

## 2021-07-22 RX ORDER — ARIPIPRAZOLE 10 MG/1
30 TABLET ORAL EVERY MORNING
Status: DISCONTINUED | OUTPATIENT
Start: 2021-07-23 | End: 2021-07-27 | Stop reason: HOSPADM

## 2021-07-22 RX ORDER — IPRATROPIUM BROMIDE AND ALBUTEROL SULFATE 2.5; .5 MG/3ML; MG/3ML
3 SOLUTION RESPIRATORY (INHALATION)
Status: DISCONTINUED | OUTPATIENT
Start: 2021-07-22 | End: 2021-07-25

## 2021-07-22 RX ORDER — FLUTICASONE PROPIONATE 50 MCG
1-2 SPRAY, SUSPENSION (ML) NASAL 2 TIMES DAILY
COMMUNITY
End: 2021-12-16 | Stop reason: SDUPTHER

## 2021-07-22 RX ORDER — POTASSIUM CHLORIDE 20 MEQ/1
20 TABLET, EXTENDED RELEASE ORAL DAILY
Status: DISCONTINUED | OUTPATIENT
Start: 2021-07-22 | End: 2021-07-27 | Stop reason: HOSPADM

## 2021-07-22 RX ORDER — METHYLPREDNISOLONE SODIUM SUCCINATE 40 MG/ML
40 INJECTION, POWDER, LYOPHILIZED, FOR SOLUTION INTRAMUSCULAR; INTRAVENOUS EVERY 6 HOURS
Status: DISCONTINUED | OUTPATIENT
Start: 2021-07-22 | End: 2021-07-25

## 2021-07-22 RX ORDER — AZITHROMYCIN 250 MG/1
500 TABLET, FILM COATED ORAL DAILY
Status: COMPLETED | OUTPATIENT
Start: 2021-07-22 | End: 2021-07-24

## 2021-07-22 RX ORDER — FLUTICASONE PROPIONATE 50 MCG
1-2 SPRAY, SUSPENSION (ML) NASAL 2 TIMES DAILY
Status: DISCONTINUED | OUTPATIENT
Start: 2021-07-22 | End: 2021-07-25

## 2021-07-22 RX ORDER — PROMETHAZINE HYDROCHLORIDE 25 MG/1
12.5-25 SUPPOSITORY RECTAL EVERY 4 HOURS PRN
Status: DISCONTINUED | OUTPATIENT
Start: 2021-07-22 | End: 2021-07-27 | Stop reason: HOSPADM

## 2021-07-22 RX ORDER — CLONIDINE HYDROCHLORIDE 0.1 MG/1
0.1 TABLET ORAL EVERY 6 HOURS PRN
Status: DISCONTINUED | OUTPATIENT
Start: 2021-07-22 | End: 2021-07-27 | Stop reason: HOSPADM

## 2021-07-22 RX ORDER — PROCHLORPERAZINE EDISYLATE 5 MG/ML
5-10 INJECTION INTRAMUSCULAR; INTRAVENOUS EVERY 4 HOURS PRN
Status: DISCONTINUED | OUTPATIENT
Start: 2021-07-22 | End: 2021-07-27 | Stop reason: HOSPADM

## 2021-07-22 RX ORDER — ONDANSETRON 2 MG/ML
4 INJECTION INTRAMUSCULAR; INTRAVENOUS EVERY 4 HOURS PRN
Status: DISCONTINUED | OUTPATIENT
Start: 2021-07-22 | End: 2021-07-27 | Stop reason: HOSPADM

## 2021-07-22 RX ORDER — MONTELUKAST SODIUM 10 MG/1
10 TABLET ORAL
Status: DISCONTINUED | OUTPATIENT
Start: 2021-07-22 | End: 2021-07-27 | Stop reason: HOSPADM

## 2021-07-22 RX ORDER — PREDNISONE 20 MG/1
60 TABLET ORAL DAILY
Status: DISCONTINUED | OUTPATIENT
Start: 2021-07-22 | End: 2021-07-22

## 2021-07-22 RX ADMIN — PREDNISONE 60 MG: 20 TABLET ORAL at 12:31

## 2021-07-22 RX ADMIN — ALBUTEROL SULFATE 5 MG: 2.5 SOLUTION RESPIRATORY (INHALATION) at 15:32

## 2021-07-22 RX ADMIN — METHYLPREDNISOLONE SODIUM SUCCINATE 40 MG: 40 INJECTION, POWDER, FOR SOLUTION INTRAMUSCULAR; INTRAVENOUS at 21:10

## 2021-07-22 RX ADMIN — MONTELUKAST 10 MG: 10 TABLET, FILM COATED ORAL at 20:47

## 2021-07-22 RX ADMIN — IPRATROPIUM BROMIDE AND ALBUTEROL SULFATE 6 ML: .5; 2.5 SOLUTION RESPIRATORY (INHALATION) at 12:35

## 2021-07-22 RX ADMIN — FLUTICASONE PROPIONATE 100 MCG: 50 SPRAY, METERED NASAL at 21:10

## 2021-07-22 RX ADMIN — POTASSIUM CHLORIDE 20 MEQ: 1500 TABLET, EXTENDED RELEASE ORAL at 20:47

## 2021-07-22 RX ADMIN — ALBUTEROL SULFATE 5 MG: 2.5 SOLUTION RESPIRATORY (INHALATION) at 18:05

## 2021-07-22 RX ADMIN — IPRATROPIUM BROMIDE AND ALBUTEROL SULFATE 3 ML: .5; 2.5 SOLUTION RESPIRATORY (INHALATION) at 23:29

## 2021-07-22 RX ADMIN — AZITHROMYCIN MONOHYDRATE 500 MG: 250 TABLET ORAL at 16:24

## 2021-07-22 RX ADMIN — ATORVASTATIN CALCIUM 20 MG: 20 TABLET, FILM COATED ORAL at 20:48

## 2021-07-22 RX ADMIN — WARFARIN SODIUM 9 MG: 6 TABLET ORAL at 20:48

## 2021-07-22 ASSESSMENT — CHA2DS2 SCORE
VASCULAR DISEASE: NO
CHF OR LEFT VENTRICULAR DYSFUNCTION: NO
PRIOR STROKE OR TIA OR THROMBOEMBOLISM: NO
DIABETES: NO
AGE 65 TO 74: NO
SEX: FEMALE
HYPERTENSION: NO
CHA2DS2 VASC SCORE: 1
AGE 75 OR GREATER: NO

## 2021-07-22 ASSESSMENT — ENCOUNTER SYMPTOMS
COUGH: 1
MUSCLE WEAKNESS: 1
SPUTUM PRODUCTION: 1
VOMITING: 0
SHORTNESS OF BREATH: 1
VOMITING: DENIES
NAUSEA: 0
DIZZINESS: 0
NAUSEA: DENIES
CHILLS: 0
BLURRED VISION: 0
HEADACHES: 0
WEAKNESS: 1
FEVER: 0
POOR JUDGMENT: 1
MYALGIAS: 0
DEPRESSION: 0
ABDOMINAL PAIN: 0

## 2021-07-22 ASSESSMENT — COGNITIVE AND FUNCTIONAL STATUS - GENERAL
MOVING FROM LYING ON BACK TO SITTING ON SIDE OF FLAT BED: A LITTLE
STANDING UP FROM CHAIR USING ARMS: A LOT
MOBILITY SCORE: 14
HELP NEEDED FOR BATHING: A LITTLE
TURNING FROM BACK TO SIDE WHILE IN FLAT BAD: A LOT
WALKING IN HOSPITAL ROOM: A LITTLE
CLIMB 3 TO 5 STEPS WITH RAILING: A LOT
SUGGESTED CMS G CODE MODIFIER MOBILITY: CL
DRESSING REGULAR UPPER BODY CLOTHING: A LITTLE
DRESSING REGULAR LOWER BODY CLOTHING: A LOT
TOILETING: A LITTLE
MOVING TO AND FROM BED TO CHAIR: A LOT
DAILY ACTIVITIY SCORE: 19
SUGGESTED CMS G CODE MODIFIER DAILY ACTIVITY: CK

## 2021-07-22 ASSESSMENT — LIFESTYLE VARIABLES
TOTAL SCORE: 0
EVER_SMOKED: YES
ALCOHOL_USE: NO
ON A TYPICAL DAY WHEN YOU DRINK ALCOHOL HOW MANY DRINKS DO YOU HAVE: 0
EVER FELT BAD OR GUILTY ABOUT YOUR DRINKING: NO
TOTAL SCORE: 0
DOES PATIENT WANT TO STOP DRINKING: NO
EVER HAD A DRINK FIRST THING IN THE MORNING TO STEADY YOUR NERVES TO GET RID OF A HANGOVER: NO
HAVE PEOPLE ANNOYED YOU BY CRITICIZING YOUR DRINKING: NO
CONSUMPTION TOTAL: NEGATIVE
TOTAL SCORE: 0
HOW MANY TIMES IN THE PAST YEAR HAVE YOU HAD 5 OR MORE DRINKS IN A DAY: 0
AVERAGE NUMBER OF DAYS PER WEEK YOU HAVE A DRINK CONTAINING ALCOHOL: 0
HAVE YOU EVER FELT YOU SHOULD CUT DOWN ON YOUR DRINKING: NO

## 2021-07-22 ASSESSMENT — FIBROSIS 4 INDEX
FIB4 SCORE: 1.02
FIB4 SCORE: 0.84

## 2021-07-22 ASSESSMENT — ACTIVITIES OF DAILY LIVING (ADL)
AMBULATION ASSISTANCE: STAND BY ASSIST
CURRENT_FUNCTION: STAND BY ASSIST

## 2021-07-22 NOTE — ED NOTES
Med Rec completed per patient   Allergies reviewed  No ORAL antibiotics in last 30 days    Patient takes Warfarin   6mg Monday-Saturday  9mg on Sunday

## 2021-07-22 NOTE — ED NOTES
(break rn) Dr. South in to reevaluate pt. Attempted to road test pt - managed to sit on side of bed with assistance, desatted to 89% with same on 2L O2. Did not attempt to ambulate further due to weakness.

## 2021-07-22 NOTE — ED TRIAGE NOTES
"Chief Complaint   Patient presents with   • Shortness of Breath     pt SOBx1 week. pt seen here on 7/19 for congestion and given allegra w/ no releif. pt wears 2L of oxygen at home. pt on 4L upon arrival       Pt BIB EMS from home. Pt does have hx of COPD. Pt states she has been compliant with her meds. Pt aox4. GCS 15      /59   Pulse 79   Temp 36.7 °C (98.1 °F)   Resp 16   Ht 1.549 m (5' 1\")   Wt (!) 129 kg (284 lb)   LMP  (LMP Unknown)   SpO2 95%   BMI 53.66 kg/m²     "

## 2021-07-22 NOTE — ED PROVIDER NOTES
ED Provider Note    Scribed for Natalia South M.D. by Mel Mcdaniel. 2021  11:53 AM    Means of arrival: Walk in  History obtained from: patient   History limited by: none      CHIEF COMPLAINT  Chief Complaint   Patient presents with   • Shortness of Breath     pt SOBx1 week. pt seen here on  for congestion and given allegra w/ no releif. pt wears 2L of oxygen at home. pt on 4L upon arrival       HPI  Lorene Haro is a 45 y.o. female, with a history of COPD, hypertension, schizophrenia who presents to the Emergency Department for cough onset 1 week ago. She has associated wheezing, increased leg swelling, phlegm production, and chest pain with coughing, however this is normal for her.  He has been seen multiple times in the past for chest pain and states that this is similar.  Denies fevers, vomiting, or diarrhea. Patient has been using her inhalers regularly and endorses taking her coumadin for atrial fibrillation. She is typically on 2 L of oxygen at home.    REVIEW OF SYSTEMS  Pertinent positive include cough, wheezing, increased leg swelling, phlegm production. Pertinent negative include fevers, vomiting, diarrhea, or new chest pain. All other systems reviewed and are negative.    PAST MEDICAL HISTORY   has a past medical history of Asthma, Bipolar 2 disorder (HCC), Chickenpox, Chronic obstructive pulmonary disease (HCC), Hypertension, On home oxygen therapy, Other psychological stress, Schizophrenic disorder (HCC), and Yeast infection of the skin (2021).    SOCIAL HISTORY  Social History     Tobacco Use   • Smoking status: Current Every Day Smoker     Packs/day: 0.10     Years: 20.00     Pack years: 2.00     Types: Cigarettes     Last attempt to quit: 2020     Years since quittin.5   • Smokeless tobacco: Never Used   Vaping Use   • Vaping Use: Never used   Substance and Sexual Activity   • Alcohol use: No   • Drug use: No       SURGICAL HISTORY   has a past surgical history that  "includes hysterectomy laparoscopy; colectomy; cholecystectomy; and knee arthroscopy (Left).    CURRENT MEDICATIONS  Home Medications     Reviewed by Robbie Khanna (Pharmacy Tech) on 07/22/21 at 1632  Med List Status: Complete   Medication Last Dose Status   acetaminophen (TYLENOL) 500 MG Tab 7/21/2021 Active   albuterol 108 (90 Base) MCG/ACT Aero Soln inhalation aerosol 7/21/2021 Active   aripiprazole (ABILIFY) 30 MG tablet 7/21/2021 Active   atorvastatin (LIPITOR) 20 MG Tab 7/21/2021 Active   famotidine (PEPCID) 40 MG Tab 7/21/2021 Active   fexofenadine-pseudoephedrine (ALLEGRA-D)  MG per tablet 7/22/2021 Active   fluticasone (FLONASE) 50 MCG/ACT nasal spray 7/21/2021 Active   furosemide (LASIX) 20 MG Tab 7/21/2021 Active   montelukast (SINGULAIR) 10 MG Tab 7/21/2021 Active   nitroglycerin (NITROSTAT) 0.4 MG SL Tab PRN Active   potassium chloride SA (KDUR) 20 MEQ Tab CR 7/21/2021 Active   tiotropium (SPIRIVA HANDIHALER) 18 MCG Cap 7/21/2021 Active   warfarin (COUMADIN) 6 MG Tab 7/21/2021 Active                ALLERGIES  Allergies   Allergen Reactions   • Amoxicillin Rash and Itching     Tolerates cephalosporins, pt reports that she gets a rash all over her body and gets itchy   • Penicillin G Rash and Itching     pt reports that she gets a rash all over her body and gets itchy       PHYSICAL EXAM   VITAL SIGNS: /59   Pulse 79   Temp 36.7 °C (98.1 °F)   Resp 16   Ht 1.549 m (5' 1\")   Wt (!) 129 kg (284 lb)   LMP  (LMP Unknown)   SpO2 95%   BMI 53.66 kg/m²    Constitutional: Chronically ill appearing obese female alert in no apparent distress.  HENT: Normocephalic, Atraumatic. Bilateral external ears normal. Nose normal.  Moist mucous membranes.  Oropharynx clear.  Eyes: Pupils are equal and reactive. Conjunctiva normal.   Neck: Supple, full range of motion  Heart: Regular rate and rhythm.  No murmurs.    Lungs: No respiratory distress, mild increased work of breathing. Expiratory wheezing " throughout  Abdomen Soft, no distention.  No tenderness to palpation.  Musculoskeletal: Venous stasis changes to bilateral lower extremities with trace edema, Atraumatic. No obvious deformities noted.   Skin: Warm, Dry.  No erythema, No rash.   Neurologic: Alert and oriented x3. Moving all extremities spontaneously without focal deficits.  Psychiatric: Affect normal, Mood normal, Appears appropriate and not intoxicated.    DIAGNOSTIC STUDIES    EKG  12 Lead EKG; Interpreted by me as shown below.   Results for orders placed or performed during the hospital encounter of 21   EKG (Now)   Result Value Ref Range    Report       [facility] Emergency Dept.    Test Date:  2021  Pt Name:    ADELINA MILLAN                Department: ER  MRN:        [id number]                      Room:       [facility]  Gender:     Female                       Technician: [id number]  :        [id number]                   Requested By:NATALIA CHEN  Order #:    [id number]                    Reading MD: Natalia Chen MD    Measurements  Intervals                                Axis  Rate:       67                           P:          19  IA:         136                          QRS:        55  QRSD:       100                          T:          -5  QT:         380  QTc:        401    Interpretive Statements  SINUS RHYTHM  MULTIPLE ATRIAL PREMATURE COMPLEXES  RSR' IN V1 OR V2, RIGHT VCD OR RVH  BORDERLINE T ABNORMALITIES, INFERIOR LEADS  No ST change  Compared to ECG 2021 00:53:23  Atrial premature complex(es) now present  No other significant change  Electronically Signed On 2021 15:59:59 PDT by Natalia Chen MD       LABS  Personally reviewed by me  Labs Reviewed   CBC WITH DIFFERENTIAL - Abnormal; Notable for the following components:       Result Value    WBC 4.6 (*)     RBC 4.18 (*)     Hemoglobin 11.8 (*)     MCHC 29.4 (*)     RDW 56.5 (*)     Lymphocytes 20.40 (*)     Lymphs (Absolute) 0.93 (*)      All other components within normal limits    Narrative:     Indicate which anticoagulants the patient is on:->UNKNOWN   COMP METABOLIC PANEL - Abnormal; Notable for the following components:    Sodium 147 (*)     Bun 7 (*)     Alkaline Phosphatase 105 (*)     All other components within normal limits    Narrative:     Indicate which anticoagulants the patient is on:->UNKNOWN   PROTHROMBIN TIME - Abnormal; Notable for the following components:    PT 19.0 (*)     INR 1.65 (*)     All other components within normal limits    Narrative:     Indicate which anticoagulants the patient is on:->UNKNOWN   PROBRAIN NATRIURETIC PEPTIDE, NT - Abnormal; Notable for the following components:    NT-proBNP 397 (*)     All other components within normal limits    Narrative:     Indicate which anticoagulants the patient is on:->UNKNOWN   TROPONIN - Abnormal; Notable for the following components:    Troponin T 35 (*)     All other components within normal limits    Narrative:     Indicate which anticoagulants the patient is on:->UNKNOWN   TROPONIN - Abnormal; Notable for the following components:    Troponin T 23 (*)     All other components within normal limits   COV-2, FLU A/B, AND RSV BY PCR    Narrative:     Have you been in close contact with a person who is suspected  or known to be positive for COVID-19 within the last 30 days  (e.g. last seen that person < 30 days ago)->Unknown   PERIPHERAL SMEAR REVIEW    Narrative:     Indicate which anticoagulants the patient is on:->UNKNOWN   ESTIMATED GFR    Narrative:     Indicate which anticoagulants the patient is on:->UNKNOWN       RADIOLOGY  Personally reviewed by me  DX-CHEST-PORTABLE (1 VIEW)   Final Result      Cardiomegaly and bilateral interstitial prominence, likely edema. No focal consolidation or pleural effusions.        ED COURSE  Vitals:    07/22/21 1359 07/22/21 1459 07/22/21 1537 07/22/21 1559   BP: 137/61 134/63  124/59   Pulse: 80 79 79 76   Resp: 16 16 (!) 36 20    Temp:       SpO2: 95% 95% 94% 97%   Weight:       Height:           Medications administered:  Medications   predniSONE (DELTASONE) tablet 60 mg (60 mg Oral Given 7/22/21 1231)   albuterol (PROVENTIL) 2.5 mg/0.5 mL solution nebulizer (0 mg  Held 7/22/21 1545)   albuterol (PROVENTIL) 2.5mg/0.5ml nebulizer solution 5 mg (has no administration in time range)   ipratropium-albuterol (DUONEB) nebulizer solution (6 mL Nebulization Given 7/22/21 1235)   albuterol (PROVENTIL) 2.5mg/0.5ml nebulizer solution 5 mg (5 mg Nebulization Given 7/22/21 1532)   azithromycin (ZITHROMAX) tablet 500 mg (500 mg Oral Given 7/22/21 1624)       Old records personally reviewed:  Patient has been seen in the ED 8 times this month for various complains including chest pain, shortness of breath, nasal congestion, and sore throat. She has been admitted several times for chest pain and had a normal stress test earlier this year. She has a history of A fib. Patient was seen on 7/19 for nasal congestion and discharged with allergy medication. Additionally she was seen on 7/18 with a normal cardiac workup and chest x-ray.    11:53 AM Patient seen and examined at bedside. The patient presents with cough and wheezing.  I discussed that we will obtain labs and imaging to further evaluate for a cause of her symptoms and treat her for her symptoms. Ordered for chest x-ray, CBC w/ diff, CMP, PT,a nd COVID/SARS to evaluate. Patient will be treated with Duo-neb and deltasone 60 mg for her symptoms.       MEDICAL DECISION MAKING  Patient with history of underlying psychiatric illness as well as hypertension and COPD who presents with increasing shortness of breath, cough and phlegm production.  She is afebrile with normal vital signs on arrival.  She is not hypoxic on her baseline oxygen requirement however does have mild increased work of breathing and diffuse wheezing.  Her EKG is unchanged from prior without ischemia or arrhythmia.  Troponin is  minimally elevated however baseline for the patient.  She has had a recent stress test within this year.  Her chest x-ray shows mild pulmonary edema.  There is no focal pneumonia.  BNP is also elevated however at her baseline.  Patient will be treated with nebs and steroids for likely COPD exacerbation.  Covid testing is negative.    4:13 PM Patient was reevaluated at bedside. Discussed lab and radiology results with the patient.  Patient conitnued wheezing and increased work of breathing. She is stable at this time on her regular 2 L of oxygen. We will attempt a road test, but I informed the patient she will likely need to be admitted to the hospital for a COPD exacerbation.     4:34 PM Patient was unable to ambulate due to weakness.  She became hypoxic as well.. Eugenio hospitalist. She will be treated with azithromycin and more albuterol.    4:48 PM I discussed the patient's case and the above findings with Dr. Mims (Hospitalist) who agreed to evaluate patient for hospitalization. Patients care will be transferred at this time.     DISPOSITION:  Patient will be hospitalized by Dr. Mims in guarded condition.     IMPRESSION  (J44.1) Acute exacerbation of chronic obstructive pulmonary disease (COPD) (HCC)    Results, diagnoses, and treatment options were discussed with the patient and/or family. Patient verbalized understanding of plan of care.     Mel HARRIS (Ade), am scribing for, and in the presence of, Natalia South M.D..    Electronically signed by: Mel Mcdaniel (Ade), 7/22/2021    Natalia HARRIS M.D. personally performed the services described in this documentation, as scribed by Mel Mcdaniel in my presence, and it is both accurate and complete. C    The note accurately reflects work and decisions made by me.  Natalia South M.D.  7/22/2021  6:08 PM

## 2021-07-23 ENCOUNTER — HOME CARE VISIT (OUTPATIENT)
Dept: HOME HEALTH SERVICES | Facility: HOME HEALTHCARE | Age: 46
End: 2021-07-23
Payer: MEDICAID

## 2021-07-23 ENCOUNTER — APPOINTMENT (OUTPATIENT)
Dept: RADIOLOGY | Facility: MEDICAL CENTER | Age: 46
DRG: 190 | End: 2021-07-23
Attending: STUDENT IN AN ORGANIZED HEALTH CARE EDUCATION/TRAINING PROGRAM
Payer: MEDICAID

## 2021-07-23 PROBLEM — E66.2 OBESITY HYPOVENTILATION SYNDROME (HCC): Status: ACTIVE | Noted: 2021-07-23

## 2021-07-23 PROBLEM — E66.813 CLASS 3 SEVERE OBESITY IN ADULT (HCC): Status: ACTIVE | Noted: 2019-11-18

## 2021-07-23 LAB
ANION GAP SERPL CALC-SCNC: 5 MMOL/L (ref 7–16)
BASOPHILS # BLD AUTO: 0 % (ref 0–1.8)
BASOPHILS # BLD: 0 K/UL (ref 0–0.12)
BUN SERPL-MCNC: 11 MG/DL (ref 8–22)
CALCIUM SERPL-MCNC: 9.2 MG/DL (ref 8.5–10.5)
CHLORIDE SERPL-SCNC: 106 MMOL/L (ref 96–112)
CO2 SERPL-SCNC: 32 MMOL/L (ref 20–33)
CREAT SERPL-MCNC: 0.9 MG/DL (ref 0.5–1.4)
EOSINOPHIL # BLD AUTO: 0 K/UL (ref 0–0.51)
EOSINOPHIL NFR BLD: 0 % (ref 0–6.9)
ERYTHROCYTE [DISTWIDTH] IN BLOOD BY AUTOMATED COUNT: 55.3 FL (ref 35.9–50)
GLUCOSE SERPL-MCNC: 161 MG/DL (ref 65–99)
HCT VFR BLD AUTO: 41.2 % (ref 37–47)
HGB BLD-MCNC: 11.7 G/DL (ref 12–16)
IMM GRANULOCYTES # BLD AUTO: 0.01 K/UL (ref 0–0.11)
IMM GRANULOCYTES NFR BLD AUTO: 0.3 % (ref 0–0.9)
INR PPP: 1.59 (ref 0.87–1.13)
LYMPHOCYTES # BLD AUTO: 0.33 K/UL (ref 1–4.8)
LYMPHOCYTES NFR BLD: 9.1 % (ref 22–41)
MCH RBC QN AUTO: 27.5 PG (ref 27–33)
MCHC RBC AUTO-ENTMCNC: 28.4 G/DL (ref 33.6–35)
MCV RBC AUTO: 96.7 FL (ref 81.4–97.8)
MONOCYTES # BLD AUTO: 0.06 K/UL (ref 0–0.85)
MONOCYTES NFR BLD AUTO: 1.7 % (ref 0–13.4)
NEUTROPHILS # BLD AUTO: 3.22 K/UL (ref 2–7.15)
NEUTROPHILS NFR BLD: 88.9 % (ref 44–72)
NRBC # BLD AUTO: 0 K/UL
NRBC BLD-RTO: 0 /100 WBC
PLATELET # BLD AUTO: 219 K/UL (ref 164–446)
PMV BLD AUTO: 11 FL (ref 9–12.9)
POTASSIUM SERPL-SCNC: 4.8 MMOL/L (ref 3.6–5.5)
PROTHROMBIN TIME: 18.5 SEC (ref 12–14.6)
RBC # BLD AUTO: 4.26 M/UL (ref 4.2–5.4)
SODIUM SERPL-SCNC: 143 MMOL/L (ref 135–145)
WBC # BLD AUTO: 3.6 K/UL (ref 4.8–10.8)

## 2021-07-23 PROCEDURE — 97165 OT EVAL LOW COMPLEX 30 MIN: CPT

## 2021-07-23 PROCEDURE — 94760 N-INVAS EAR/PLS OXIMETRY 1: CPT

## 2021-07-23 PROCEDURE — 71275 CT ANGIOGRAPHY CHEST: CPT

## 2021-07-23 PROCEDURE — A9270 NON-COVERED ITEM OR SERVICE: HCPCS | Performed by: INTERNAL MEDICINE

## 2021-07-23 PROCEDURE — 36415 COLL VENOUS BLD VENIPUNCTURE: CPT

## 2021-07-23 PROCEDURE — 5A09357 ASSISTANCE WITH RESPIRATORY VENTILATION, LESS THAN 24 CONSECUTIVE HOURS, CONTINUOUS POSITIVE AIRWAY PRESSURE: ICD-10-PCS | Performed by: INTERNAL MEDICINE

## 2021-07-23 PROCEDURE — 700111 HCHG RX REV CODE 636 W/ 250 OVERRIDE (IP): Performed by: INTERNAL MEDICINE

## 2021-07-23 PROCEDURE — 94664 DEMO&/EVAL PT USE INHALER: CPT

## 2021-07-23 PROCEDURE — 94669 MECHANICAL CHEST WALL OSCILL: CPT

## 2021-07-23 PROCEDURE — 94640 AIRWAY INHALATION TREATMENT: CPT

## 2021-07-23 PROCEDURE — 94660 CPAP INITIATION&MGMT: CPT

## 2021-07-23 PROCEDURE — 97162 PT EVAL MOD COMPLEX 30 MIN: CPT

## 2021-07-23 PROCEDURE — 700102 HCHG RX REV CODE 250 W/ 637 OVERRIDE(OP): Performed by: INTERNAL MEDICINE

## 2021-07-23 PROCEDURE — A9270 NON-COVERED ITEM OR SERVICE: HCPCS | Performed by: STUDENT IN AN ORGANIZED HEALTH CARE EDUCATION/TRAINING PROGRAM

## 2021-07-23 PROCEDURE — 770001 HCHG ROOM/CARE - MED/SURG/GYN PRIV*

## 2021-07-23 PROCEDURE — 700102 HCHG RX REV CODE 250 W/ 637 OVERRIDE(OP): Performed by: STUDENT IN AN ORGANIZED HEALTH CARE EDUCATION/TRAINING PROGRAM

## 2021-07-23 PROCEDURE — 85025 COMPLETE CBC W/AUTO DIFF WBC: CPT

## 2021-07-23 PROCEDURE — 85610 PROTHROMBIN TIME: CPT

## 2021-07-23 PROCEDURE — 99406 BEHAV CHNG SMOKING 3-10 MIN: CPT

## 2021-07-23 PROCEDURE — 80048 BASIC METABOLIC PNL TOTAL CA: CPT

## 2021-07-23 PROCEDURE — 99406 BEHAV CHNG SMOKING 3-10 MIN: CPT | Performed by: STUDENT IN AN ORGANIZED HEALTH CARE EDUCATION/TRAINING PROGRAM

## 2021-07-23 PROCEDURE — 700117 HCHG RX CONTRAST REV CODE 255: Performed by: STUDENT IN AN ORGANIZED HEALTH CARE EDUCATION/TRAINING PROGRAM

## 2021-07-23 PROCEDURE — 700101 HCHG RX REV CODE 250: Performed by: INTERNAL MEDICINE

## 2021-07-23 PROCEDURE — 99233 SBSQ HOSP IP/OBS HIGH 50: CPT | Mod: 25 | Performed by: STUDENT IN AN ORGANIZED HEALTH CARE EDUCATION/TRAINING PROGRAM

## 2021-07-23 RX ORDER — FUROSEMIDE 20 MG/1
20 TABLET ORAL
Status: DISCONTINUED | OUTPATIENT
Start: 2021-07-23 | End: 2021-07-27 | Stop reason: HOSPADM

## 2021-07-23 RX ORDER — GUAIFENESIN 600 MG/1
600 TABLET, EXTENDED RELEASE ORAL EVERY 12 HOURS
Status: DISCONTINUED | OUTPATIENT
Start: 2021-07-23 | End: 2021-07-27 | Stop reason: HOSPADM

## 2021-07-23 RX ADMIN — FLUTICASONE PROPIONATE 100 MCG: 50 SPRAY, METERED NASAL at 17:10

## 2021-07-23 RX ADMIN — FLUTICASONE PROPIONATE 100 MCG: 50 SPRAY, METERED NASAL at 05:03

## 2021-07-23 RX ADMIN — METHYLPREDNISOLONE SODIUM SUCCINATE 40 MG: 40 INJECTION, POWDER, FOR SOLUTION INTRAMUSCULAR; INTRAVENOUS at 11:50

## 2021-07-23 RX ADMIN — ATORVASTATIN CALCIUM 20 MG: 20 TABLET, FILM COATED ORAL at 17:10

## 2021-07-23 RX ADMIN — WARFARIN SODIUM 9 MG: 6 TABLET ORAL at 17:10

## 2021-07-23 RX ADMIN — ARIPIPRAZOLE 30 MG: 10 TABLET ORAL at 05:03

## 2021-07-23 RX ADMIN — METHYLPREDNISOLONE SODIUM SUCCINATE 40 MG: 40 INJECTION, POWDER, FOR SOLUTION INTRAMUSCULAR; INTRAVENOUS at 23:49

## 2021-07-23 RX ADMIN — Medication 1 LOZENGE: at 20:13

## 2021-07-23 RX ADMIN — GUAIFENESIN 600 MG: 600 TABLET, EXTENDED RELEASE ORAL at 17:10

## 2021-07-23 RX ADMIN — AZITHROMYCIN MONOHYDRATE 500 MG: 250 TABLET ORAL at 05:03

## 2021-07-23 RX ADMIN — ACETAMINOPHEN 650 MG: 325 TABLET, FILM COATED ORAL at 20:17

## 2021-07-23 RX ADMIN — METHYLPREDNISOLONE SODIUM SUCCINATE 40 MG: 40 INJECTION, POWDER, FOR SOLUTION INTRAMUSCULAR; INTRAVENOUS at 05:03

## 2021-07-23 RX ADMIN — FUROSEMIDE 20 MG: 20 TABLET ORAL at 17:10

## 2021-07-23 RX ADMIN — IOHEXOL 50 ML: 350 INJECTION, SOLUTION INTRAVENOUS at 18:08

## 2021-07-23 RX ADMIN — IPRATROPIUM BROMIDE AND ALBUTEROL SULFATE 3 ML: .5; 2.5 SOLUTION RESPIRATORY (INHALATION) at 14:59

## 2021-07-23 RX ADMIN — IPRATROPIUM BROMIDE AND ALBUTEROL SULFATE 3 ML: .5; 2.5 SOLUTION RESPIRATORY (INHALATION) at 11:27

## 2021-07-23 RX ADMIN — FUROSEMIDE 20 MG: 20 TABLET ORAL at 11:50

## 2021-07-23 RX ADMIN — IPRATROPIUM BROMIDE AND ALBUTEROL SULFATE 3 ML: .5; 2.5 SOLUTION RESPIRATORY (INHALATION) at 22:04

## 2021-07-23 RX ADMIN — METHYLPREDNISOLONE SODIUM SUCCINATE 40 MG: 40 INJECTION, POWDER, FOR SOLUTION INTRAMUSCULAR; INTRAVENOUS at 17:10

## 2021-07-23 RX ADMIN — TIOTROPIUM BROMIDE INHALATION SPRAY 5 MCG: 3.12 SPRAY, METERED RESPIRATORY (INHALATION) at 22:06

## 2021-07-23 RX ADMIN — POTASSIUM CHLORIDE 20 MEQ: 1500 TABLET, EXTENDED RELEASE ORAL at 17:10

## 2021-07-23 RX ADMIN — MONTELUKAST 10 MG: 10 TABLET, FILM COATED ORAL at 20:11

## 2021-07-23 RX ADMIN — FAMOTIDINE 40 MG: 20 TABLET ORAL at 05:03

## 2021-07-23 ASSESSMENT — ACTIVITIES OF DAILY LIVING (ADL): TOILETING: INDEPENDENT

## 2021-07-23 ASSESSMENT — COGNITIVE AND FUNCTIONAL STATUS - GENERAL
STANDING UP FROM CHAIR USING ARMS: A LITTLE
MOVING FROM LYING ON BACK TO SITTING ON SIDE OF FLAT BED: UNABLE
MOBILITY SCORE: 12
DRESSING REGULAR LOWER BODY CLOTHING: A LOT
EATING MEALS: A LITTLE
HELP NEEDED FOR BATHING: A LOT
SUGGESTED CMS G CODE MODIFIER MOBILITY: CL
TURNING FROM BACK TO SIDE WHILE IN FLAT BAD: A LOT
CLIMB 3 TO 5 STEPS WITH RAILING: A LOT
DAILY ACTIVITIY SCORE: 13
PERSONAL GROOMING: A LOT
DRESSING REGULAR UPPER BODY CLOTHING: A LOT
WALKING IN HOSPITAL ROOM: A LITTLE
TOILETING: A LOT
SUGGESTED CMS G CODE MODIFIER DAILY ACTIVITY: CL
MOVING TO AND FROM BED TO CHAIR: UNABLE

## 2021-07-23 ASSESSMENT — PAIN DESCRIPTION - PAIN TYPE
TYPE: ACUTE PAIN
TYPE: ACUTE PAIN

## 2021-07-23 ASSESSMENT — LIFESTYLE VARIABLES
SUBSTANCE_ABUSE: 0
PACK_YEARS: 2

## 2021-07-23 ASSESSMENT — ENCOUNTER SYMPTOMS
SPEECH CHANGE: 0
HEMOPTYSIS: 0
DOUBLE VISION: 0
PND: 1
COUGH: 1
NAUSEA: 0
FOCAL WEAKNESS: 0
SHORTNESS OF BREATH: 1
NERVOUS/ANXIOUS: 1
WEIGHT LOSS: 0
SORE THROAT: 1
WHEEZING: 1
FALLS: 0
WEAKNESS: 1
SPUTUM PRODUCTION: 1
SENSORY CHANGE: 0
DIAPHORESIS: 1
VOMITING: 0

## 2021-07-23 ASSESSMENT — GAIT ASSESSMENTS: GAIT LEVEL OF ASSIST: UNABLE TO PARTICIPATE

## 2021-07-23 NOTE — ED NOTES
RT called for breathing treatment. Pt resting in bed, equal chest rise and fall. No signs of distress.

## 2021-07-23 NOTE — ASSESSMENT & PLAN NOTE
Life style modifications including healthy plant based diet and regular exercise recommended   Outpatient weight loss surgery recommended

## 2021-07-23 NOTE — H&P
Hospital Medicine History & Physical Note    Date of Service  7/22/2021    Primary Care Physician  SHANKAR Gleason    Consultants  N/A    Code Status  Full Code    Chief Complaint  Chief Complaint   Patient presents with   • Shortness of Breath     pt SOBx1 week. pt seen here on 7/19 for congestion and given allegra w/ no releif. pt wears 2L of oxygen at home. pt on 4L upon arrival       History of Presenting Illness  Lorene Haro is a 45 y.o. female with past medical history of COPD on 2 L home oxygen, hypertension, schizophrenia, and paroxysmal A. fib who presented 7/22/2021 with shortness of breath.  Patient reports she has had worsening shortness of breath and cough for the last week but progressively worsened over the last 2 days.  Patient reports wheezing and phlegm production.  Patient denies chest pain, fevers, chills, nausea, or diarrhea.  Patient does admit to increasing weakness with difficulty walking around at home with her walker.  Patient reports she has been using all of her home inhalers regularly.  Of note patient was admitted 1 month ago for episode of chest pain with negative work-up.  She has had multiple ER visits for her COPD.    In the ER patient tachypneic and initially hypoxic requiring 4 L however has been weaned back to her normal 2 L.  Labs significant for WBC 4.6, hemoglobin 11.8, Sodium 147, and negative troponin x2.  Her Covid test was negative.  Chest x-ray shown cardiomegaly and bilateral interstitial prominence.     I discussed the plan of care with patient.    Review of Systems  Review of Systems   Constitutional: Positive for malaise/fatigue. Negative for chills and fever.   HENT: Negative for congestion.    Eyes: Negative for blurred vision.   Respiratory: Positive for cough, sputum production and shortness of breath.    Cardiovascular: Positive for leg swelling. Negative for chest pain.   Gastrointestinal: Negative for abdominal pain, nausea and vomiting.    Genitourinary: Negative for dysuria.   Musculoskeletal: Negative for myalgias.   Skin: Negative for rash.   Neurological: Positive for weakness. Negative for dizziness and headaches.   Psychiatric/Behavioral: Negative for depression.   All other systems reviewed and are negative.      Past Medical History   has a past medical history of Asthma, Bipolar 2 disorder (HCC), Chickenpox, Chronic obstructive pulmonary disease (HCC), Hypertension, On home oxygen therapy, Other psychological stress, Schizophrenic disorder (HCC), and Yeast infection of the skin (4/1/2021).    Surgical History   has a past surgical history that includes hysterectomy laparoscopy; colectomy; cholecystectomy; and knee arthroscopy (Left).     Family History  family history includes Cancer in her sister; No Known Problems in her mother.   Family history reviewed with patient. There is no family history that is pertinent to the chief complaint.     Social History   reports that she has been smoking cigarettes. She has a 2.00 pack-year smoking history. She has never used smokeless tobacco. She reports that she does not drink alcohol and does not use drugs.    Allergies  Allergies   Allergen Reactions   • Amoxicillin Rash and Itching     Tolerates cephalosporins, pt reports that she gets a rash all over her body and gets itchy   • Penicillin G Rash and Itching     pt reports that she gets a rash all over her body and gets itchy       Medications  Prior to Admission Medications   Prescriptions Last Dose Informant Patient Reported? Taking?   acetaminophen (TYLENOL) 500 MG Tab 7/21/2021 at AFTERNOON Patient Yes No   Sig: Take 1,000 mg by mouth every 6 hours as needed (pain).   albuterol 108 (90 Base) MCG/ACT Aero Soln inhalation aerosol 7/21/2021 at AFTERNOON Patient No No   Sig: Inhale 2 Puffs every 6 hours as needed for Shortness of Breath.   aripiprazole (ABILIFY) 30 MG tablet 7/21/2021 at AM Patient Yes No   Sig: Take 30 mg by mouth every morning.    atorvastatin (LIPITOR) 20 MG Tab 7/21/2021 at PM Patient No No   Sig: Take 1 Tab by mouth every day.   famotidine (PEPCID) 40 MG Tab 7/21/2021 at AM  Yes No   Sig: Take 40 mg by mouth every day. Indications: Heartburn   fexofenadine-pseudoephedrine (ALLEGRA-D)  MG per tablet 7/22/2021 at AM  No No   Sig: Take 1 tablet by mouth 2 times a day.   fluticasone (FLONASE) 50 MCG/ACT nasal spray 7/21/2021 at PM  Yes Yes   Sig: Administer 1-2 Sprays into affected nostril(S) 2 times a day.   furosemide (LASIX) 20 MG Tab 7/21/2021 at PM Patient No No   Sig: Take 1 tablet by mouth 2 times a day.   montelukast (SINGULAIR) 10 MG Tab 7/21/2021 at PM Patient No No   Sig: Take 1 Tab by mouth every bedtime.   nitroglycerin (NITROSTAT) 0.4 MG SL Tab PRN at PRN Patient No No   Sig: Place 1 tablet under the tongue as needed for Chest Pain.   potassium chloride SA (KDUR) 20 MEQ Tab CR 7/21/2021 at AM Patient No No   Sig: Take 1 tablet by mouth every day.   tiotropium (SPIRIVA HANDIHALER) 18 MCG Cap 7/21/2021 at AM Patient No No   Sig: Inhale 1 Cap by mouth every day.   warfarin (COUMADIN) 6 MG Tab 7/21/2021 at PM Patient Yes No   Sig: Take 6-9 mg by mouth every day. 6mg Monday-Saturday  9mg on Sunday      Facility-Administered Medications: None       Physical Exam  Temp:  [36.7 °C (98.1 °F)] 36.7 °C (98.1 °F)  Pulse:  [74-80] 74  Resp:  [16-36] 16  BP: (104-137)/(59-63) 120/59  SpO2:  [94 %-97 %] 95 %    Physical Exam  Vitals and nursing note reviewed.   Constitutional:       General: She is in acute distress.      Appearance: She is ill-appearing.   HENT:      Head: Normocephalic and atraumatic.      Right Ear: External ear normal.      Left Ear: External ear normal.      Nose: Nose normal.      Mouth/Throat:      Pharynx: Oropharynx is clear.   Eyes:      Conjunctiva/sclera: Conjunctivae normal.   Cardiovascular:      Rate and Rhythm: Normal rate and regular rhythm.      Pulses: Normal pulses.      Heart sounds: Normal heart  sounds.   Pulmonary:      Effort: Pulmonary effort is normal. No respiratory distress.      Breath sounds: Wheezing present.      Comments: Speaking in short sentences   On NC   Abdominal:      General: Abdomen is flat. There is no distension.      Palpations: Abdomen is soft.      Tenderness: There is abdominal tenderness (mild right sided).   Musculoskeletal:         General: Normal range of motion.      Cervical back: Normal range of motion and neck supple.      Comments: Mild LE edema, nonpitting    Skin:     General: Skin is warm and dry.      Comments: Multiple scabs across forehead    Neurological:      General: No focal deficit present.      Mental Status: She is alert and oriented to person, place, and time.      Cranial Nerves: No cranial nerve deficit.      Motor: Weakness (4/5 stength b/l LE ) present.         Laboratory:  Recent Labs     07/22/21  1222   WBC 4.6*   RBC 4.18*   HEMOGLOBIN 11.8*   HEMATOCRIT 40.1   MCV 95.9   MCH 28.2   MCHC 29.4*   RDW 56.5*   PLATELETCT 210   MPV 10.9     Recent Labs     07/22/21  1222   SODIUM 147*   POTASSIUM 4.3   CHLORIDE 107   CO2 32   GLUCOSE 95   BUN 7*   CREATININE 0.92   CALCIUM 8.7     Recent Labs     07/22/21  1222   ALTSGPT 14   ASTSGOT 17   ALKPHOSPHAT 105*   TBILIRUBIN 0.4   GLUCOSE 95     Recent Labs     07/22/21  1109 07/22/21  1222   INR 1.80* 1.65*     Recent Labs     07/22/21  1222   NTPROBNP 397*         Recent Labs     07/22/21  1222 07/22/21  1431   TROPONINT 35* 23*       Imaging:  DX-CHEST-PORTABLE (1 VIEW)   Final Result      Cardiomegaly and bilateral interstitial prominence, likely edema. No focal consolidation or pleural effusions.          X-Ray:  I have personally reviewed the images and compared with prior images.    Assessment/Plan:  I anticipate this patient will require at least two midnights for appropriate medical management, necessitating inpatient admission.    Chronic obstructive pulmonary disease with acute exacerbation (HCC)-  (present on admission)  Assessment & Plan  Start azithromycin  Solu-Medrol  Duo nebs and RT consult  Continue duonebs,'s montelukast, fluticasone, and Spiriva  Supplemental O2 as needed, at 2 L baseline  Encourage IS use    HLD (hyperlipidemia)- (present on admission)  Assessment & Plan  Continue statin    Paroxysmal A-fib (HCC)- (present on admission)  Assessment & Plan  Continue warfarin per pharmacy      Acute on chronic respiratory failure (HCC)- (present on admission)  Assessment & Plan  Initially requiring 4 L nasal cannula, currently back at 2 L  Continue plan as above    Bipolar Disorder- (present on admission)  Assessment & Plan  Continue home meds      VTE prophylaxis: SCDs/TEDs and therapeutic anticoagulation with warfarin

## 2021-07-23 NOTE — RESPIRATORY CARE
"COPD EDUCATION by COPD CLINICAL EDUCATOR  7/23/2021  at  9:54 AM by Joana Ireland, RRT     Patient interviewed by COPD education team.  Patient unable to participate in full program.  A short intervention has been conducted.  A comprehensive packet including information about COPD, types of treatments to manage their disease and safe home Oxygen usage was provided and reviewed with patient at the bedside.     Smoking Cessation Intervention and education completed, 4 minutes spent on smoking cessation education with patient.  Provided smoking cessation packet with \"Tips to Quit\" and brochure for \"Free Smoking Cessation Classes\".     COPD Screen  COPD Risk Screening  Do you have a history of COPD?: Yes  Do you have a Pulmonologist?:  (unknown)    COPD Assessment  COPD Clinical Specialists ONLY  COPD Education Initiated: Yes--Short Intervention (COPD exacerbation. admitted 8 times this year. requested palliative care)  DME Company: [employer]  DME Equipment Type: oxygen, nebulizer  Physician Follow Up Appointment: 08/13/21  Appt Time: 1230  Physician Name: Maria Victoria Garcia- [facility]  Pulmonologist Name: Jessica Whitfield- last appointment on 5/17/2021. no show. will reschedule closer to discharge  Referrals Initiated: Yes  Pulmonary Rehab: Yes  Smoking Cessation: Yes  $ Smoking Cessation 3-10 Minutes: Symptomatic  Smoking Pack Years: 2  Palliative Care:  (requested. patient has been admitted 8 times this year)  Hospice: N/A  Home Health Care:  (patient enrolled in [facility])  [facility] Outreach: Yes  [facility]: N/A  [facility]: N/A (medicaid FFS)  Private In-Home Care Agency: N/A  Is this a COPD exacerbation patient?: Yes  $ Demo/Eval of SVN's, MDI's and Aerosols: Yes    Meds to Beds  Would the patient like to opt in for Bedside Medication Delivery at Discharge?: Yes, interested     MY COPD ACTION PLAN     It is recommended that patients and physicians /healthcare " "providers complete this action plan together. This plan should be discussed at each physician visit and updated as needed.    The green, yellow and red zones show groups of symptoms of COPD. This list of symptoms is not comprehensive, and you may experience other symptoms. In the \"Actions\" column, your healthcare provider has recommended actions for you to take based on your symptoms.    Patient Name: Lorene Haro   YOB: 1975   Last Updated on: 7/23/2021  9:53 AM   Green Zone:  I am doing well today Actions   •  Usual activitiy and exercise level •  Take daily medications   •  Usual amounts of cough and phlegm/mucus •  Use oxygen as prescribed   •  Sleep well at night •  Continue regular exercise/diet plan   •  Appetite is good •  At all times avoid cigarette smoke, inhaled irritants     Daily Medications (these medications are taken every day):   Tiotropium Bromide Monohydrate (Spiriva) 2 Puffs Once daily        Yellow Zone:  I am having a bad day or a COPD flare Actions   •  More breathless than usual •  Continue daily medications   •  I have less energy for my daily activities •  Use quick relief inhaler as ordered   •  Increased or thicker phlegm/mucus •  Use oxygen as prescribed   •  Using quick relief inhaler/nebulizer more often •  Get plenty of rest   •  Swelling of ankles more than usual •  Use pursed lip breathing   •  More coughing than usual •  At all times avoid cigarette smoke, inhaled irritants   •  I feel like I have a \"chest cold\"   •  Poor sleep and my symptoms woke me up   •  My appetite is not good   •  My medicine is not helping    •  Call provider immediately if symptoms don’t improve     Continue daily medications, add rescue medications:   Albuterol/Ipratropium (Combivent, Duoneb)  Albuterol 3mL via nebulizer  2 Puffs Every 4 hours PRN  Every 4 hours PRN       Medications to be used during a flare up, (as Discussed with Provider):           Additional Information:  Use " albuterol inhaler with spacer and rinse nebulizer per manufacturers recommendation    Red Zone:  I need urgent medical care Actions   •  Severe shortness of breath even at rest •  Call 911 or seek medical care immediately   •  Not able to do any activity because of breathing    •  Fever or shaking chills    •  Feeling confused or very drowsy     •  Chest pains    •  Coughing up blood

## 2021-07-23 NOTE — THERAPY
Physical Therapy   Initial Evaluation     Patient Name: Lorene Haro  Age:  45 y.o., Sex:  female  Medical Record #: [id number]  Today's Date: 7/23/2021     Precautions: Fall Risk (hx of COPD with baseline 2LPM at home)    Assessment  Patient is 45 y.o. female presenting acutely with acute on chronic COPD exacerbation. Pt with frequent ED encounters this month, this will be pts 8th. Also hospitalized last month. She requires assistance to negotiate bed mob and transfers today. Max encouragement to participate as she is very lethargic. Anticipate she will require post-acute placement prior to DC home as pt lives alone and is having difficulty caring for herself.     Pt may benefit from SLP consult as she demo'd coughing after drinking think liquids on multiple occasions during session.     Plan    Recommend Physical Therapy 3 times per week until therapy goals are met for the following treatments:  Bed Mobility, Gait Training, Neuro Re-Education / Balance, Therapeutic Activities and Therapeutic Exercises    DC Equipment Recommendations: None  Discharge Recommendations: Recommend post-acute placement for additional physical therapy services prior to discharge home     Objective     07/23/21 1020   Prior Living Situation   Prior Services Intermittent Physical Support for ADL Per Service   Housing / Facility 2 Story Apartment / Condo   Steps Into Home (FOS)   Elevator Yes   Equipment Owned 4-Wheel Walker   Lives with - Patient's Self Care Capacity Alone and Unable to Care For Self   Comments Reports her payee gets her groceries for her, recently hospitalized 1 mo. prior. This is 8th hospital encounter this month.   Prior Level of Functional Mobility   Bed Mobility Independent   Transfer Status Independent   Ambulation Independent   Distance Ambulation (Feet) (household distances)   Assistive Devices Used 4-Wheel Walker   Cognition    Speech/ Communication Delayed Responses (mumbling)   Level of Consciousness  (Lethargic)   Attention Impaired   Initiation Impaired   Comments requires max encouragement to participate, difficulty maintaining level of arousal   Strength Lower Body   Gross Strength Generalized Weakness, Equal Bilaterally   Balance Assessment   Sitting Balance (Static) Fair   Sitting Balance (Dynamic) Fair -   Standing Balance (Static) Poor +   Standing Balance (Dynamic) Poor +   Gait Analysis   Gait Level Of Assist Unable to Participate   Weight Bearing Status No restrictions   Bed Mobility    Supine to Sit Moderate Assist   Sit to Supine   (Up in chair post assessment)   Functional Mobility   Bed, Chair, Wheelchair Transfer Minimal Assist   Short Term Goals    Short Term Goal # 1 Pt will perform supine<>sit with HOB flat and SPV within 6 visits to improve bed mob for DC.   Short Term Goal # 2 Pt will perform all fxnl transfers with 4WW and SPV within 6 visits to improve safe transfers for DC.   Short Term Goal # 3 Pt will amb >50ft with 4WW and SPV within 6 visits to amb limited household distances for DC.

## 2021-07-23 NOTE — CONSULTS
"Pulmonary Consultation Note    Patient ID:   Name:             Lorene Haro   YOB: 1975  Age:                 45 y.o.  female   MRN:               [id number]  Referring Provider: Romy Gallardo MD                                                  History of Present Illness:     Pt is a 44 y/o F with PMH significant for Mild COPD (on 2L O2 at home, FEV1/FVC 89%, FEV1 70% per PFTs 2020) with overlap syndrome of KATHIA/OHS (failed BiPAP/CPAP, put on AVAPS), HFpEF with severe pulmonary HTN (RVSP 65 mmHg per TTE 2019), paroxysmal afib (on Warfarin, previously subtherapeutic), morbid obesity, bipolar disorder, schizophrenia, tobacco use disorder, and hypertension who presented 7/22/2021 with shortness of breath x 1 week which had worsened acutely in the last 2 days.She had wheezing, leg swelling, productive cough with yellow phlegm, as well as chest discomfort from persistent coughing. She had previously been admitted for similar symptoms (last discharge was 6/27/2021). Pt reports that she is compliant with all of her home medications including her warfarin. She has tried quitting smoking but \"It's just too hard to quit\". Pulmonary was consulted for multiple readmissions in the context of her 8th COPD exacerbation admission within the last year.      Review of Systems: Review of Systems   Constitutional: Positive for diaphoresis and malaise/fatigue. Negative for weight loss.   HENT: Positive for congestion and sore throat.    Eyes: Negative for double vision.   Respiratory: Positive for cough, sputum production, shortness of breath and wheezing. Negative for hemoptysis.         Yellow phlegm production   Cardiovascular: Positive for leg swelling and PND. Negative for chest pain.        +chest discomfort   Gastrointestinal: Negative for nausea and vomiting.   Musculoskeletal: Negative for falls.   Neurological: Positive for weakness. Negative for sensory change, speech change and focal weakness. " "  Psychiatric/Behavioral: Negative for substance abuse. The patient is nervous/anxious.        Past Medical History:   Past Medical History:   Diagnosis Date   • Asthma    • Bipolar 2 disorder (HCC)    • Chickenpox    • Chronic obstructive pulmonary disease (HCC)    • Hypertension    • On home oxygen therapy    • Other psychological stress    • Schizophrenic disorder (HCC)    • Yeast infection of the skin 4/1/2021       Past Surgical History:   Past Surgical History:   Procedure Laterality Date   • CHOLECYSTECTOMY     • COLECTOMY     • HYSTERECTOMY LAPAROSCOPY     • KNEE ARTHROSCOPY Left        Family History: family history includes Cancer in her sister; No Known Problems in her mother.    Social History:  reports that she has been smoking cigarettes. She has a 2.00 pack-year smoking history. She has never used smokeless tobacco. She reports that she does not drink alcohol and does not use drugs.    Objective:   Vitals/ General Appearance:   Weight/BMI: Body mass index is 53.11 kg/m².  /58   Pulse 88   Temp 36.4 °C (97.6 °F) (Temporal)   Resp 18   Ht 1.549 m (5' 1\")   Wt (!) 127 kg (281 lb 1.4 oz)   SpO2 94%   Vitals:    07/23/21 0425 07/23/21 0800 07/23/21 1129 07/23/21 1130   BP: 111/55 112/58     Pulse: 88 68 88 88   Resp: (!) 21 17  18   Temp: 36.8 °C (98.3 °F) 36.4 °C (97.6 °F)     TempSrc: Temporal Temporal     SpO2: 90% 94% 97% 94%   Weight:       Height:         Oxygen Therapy:  Pulse Oximetry: 94 %, O2 (LPM): 4, O2 Delivery Device: Silicone Nasal Cannula    Constitutional:   Ill-appearing, Well nourished, obese, unkempt, in somewhat acute distress  HENMT:  Normocephalic, Atraumatic, Oropharynx dry mucosal membranes, No oral exudates, Nose normal.  Wearing NC  Eyes:  EOMI, Conjunctiva normal, No discharge.  Neck:  Normal range of motion.  Cardiovascular:  Normal heart rate, Normal rhythm, No murmurs, 2+ edema in b/l LE  Lungs:  Labored breathing, diffuse crackles and wheezes in all lobes " bilaterally  Abdomen: Bowel sounds normal, Soft, protuberant  Skin: Warm, Dry, Chronic venous stasis changes in b/l LE  Neurologic: Alert & oriented x 4, No focal deficits noted, cranial nerves II through XII are grossly intact.  Psychiatric: Affect normal, Judgment normal, Mood normal.    Labs:  Recent Labs     07/22/21  1222 07/23/21  0614   WBC 4.6* 3.6*   RBC 4.18* 4.26   HEMOGLOBIN 11.8* 11.7*   HEMATOCRIT 40.1 41.2   MCV 95.9 96.7   MCH 28.2 27.5   MCHC 29.4* 28.4*   RDW 56.5* 55.3*   PLATELETCT 210 219   MPV 10.9 11.0     Recent Labs     07/22/21  1109 07/22/21  1222 07/23/21  0614   INR 1.80* 1.65* 1.59*             Recent Labs     07/22/21  1222 07/23/21  0614   SODIUM 147* 143   POTASSIUM 4.3 4.8   CHLORIDE 107 106   CO2 32 32   GLUCOSE 95 161*   BUN 7* 11     Recent Labs     07/22/21  1222 07/23/21  0614   SODIUM 147* 143   POTASSIUM 4.3 4.8   CHLORIDE 107 106   CO2 32 32   BUN 7* 11   CREATININE 0.92 0.90   CALCIUM 8.7 9.2     No results found for this or any previous visit.      Imaging:   DX-CHEST-PORTABLE (1 VIEW)   Final Result      Cardiomegaly and bilateral interstitial prominence, likely edema. No focal consolidation or pleural effusions.      CT-CTA CHEST PULMONARY ARTERY W/ RECONS    (Results Pending)       Hospital Medications:    Current Facility-Administered Medications:   •  furosemide (LASIX) tablet 20 mg, 20 mg, Oral, BID DIURETIC, Romy Gallardo M.D., 20 mg at 07/23/21 1150  •  warfarin (COUMADIN) tablet 9 mg, 9 mg, Oral, Once, Romy Gallardo M.D.  •  acetaminophen (Tylenol) tablet 650 mg, 650 mg, Oral, Q6HRS PRN, Mirna Mims D.O.  •  cloNIDine (CATAPRES) tablet 0.1 mg, 0.1 mg, Oral, Q6HRS PRN, Mirna Mims D.O.  •  enalaprilat (VASOTEC) injection 1.25 mg, 1.25 mg, Intravenous, Q6HRS PRN, Mirna Mims D.O.  •  labetalol (NORMODYNE/TRANDATE) injection 10 mg, 10 mg, Intravenous, Q4HRS PRN, MARCEL SargentO.  •  ondansetron (ZOFRAN) syringe/vial injection 4 mg, 4 mg,  Intravenous, Q4HRS PRN, TETE Sargent.O.  •  ondansetron (ZOFRAN ODT) dispertab 4 mg, 4 mg, Oral, Q4HRS PRN, TETE Sargent.O.  •  promethazine (PHENERGAN) tablet 12.5-25 mg, 12.5-25 mg, Oral, Q4HRS PRN, TETE Sargent.O.  •  promethazine (PHENERGAN) suppository 12.5-25 mg, 12.5-25 mg, Rectal, Q4HRS PRN, TETE Sargnet.O.  •  prochlorperazine (COMPAZINE) injection 5-10 mg, 5-10 mg, Intravenous, Q4HRS PRN, Mirna Mims D.O.  •  Respiratory Therapy Consult, , Nebulization, Continuous RT, TETE Sargent.O.  •  ipratropium-albuterol (DUONEB) nebulizer solution, 3 mL, Nebulization, Q4HRS (RT), Mirna Mims D.O., 3 mL at 07/23/21 1127  •  methylPREDNISolone (SOLU-MEDROL) 40 MG injection 40 mg, 40 mg, Intravenous, Q6HRS, Mirna Mims D.O., 40 mg at 07/23/21 1150  •  azithromycin (ZITHROMAX) tablet 500 mg, 500 mg, Oral, DAILY, Mirna Mims D.O., 500 mg at 07/23/21 0503  •  ARIPiprazole (Abilify) tablet 30 mg, 30 mg, Oral, QAM, Mirna Mims, D.O., 30 mg at 07/23/21 0503  •  atorvastatin (LIPITOR) tablet 20 mg, 20 mg, Oral, DAILY, Mirna Mims D.O., 20 mg at 07/22/21 2048  •  famotidine (PEPCID) tablet 40 mg, 40 mg, Oral, DAILY, Mirna Mims, D.O., 40 mg at 07/23/21 0503  •  fluticasone (FLONASE) nasal spray  mcg, 1-2 Spray, Nasal, BID, Mirna Mims D.O., 100 mcg at 07/23/21 0503  •  montelukast (SINGULAIR) tablet 10 mg, 10 mg, Oral, QHS, Mirna Mims D.O., 10 mg at 07/22/21 2047  •  potassium chloride SA (Kdur) tablet 20 mEq, 20 mEq, Oral, DAILY, Mirna Mims D.O., 20 mEq at 07/22/21 2047  •  tiotropium (Spiriva Respimat) 2.5 mcg/Act inhalation spray 5 mcg, 5 mcg, Inhalation, QDAILY (RT), Mirna Mims D.O.  •  MD Alert...Warfarin per Pharmacy, , Other, PHARMACY TO DOSE, Mirna Mims D.O.    Current Outpatient Medications:  Medications Prior to Admission   Medication Sig Dispense Refill Last Dose   • fluticasone (FLONASE) 50 MCG/ACT nasal spray  Administer 1-2 Sprays into affected nostril(S) 2 times a day.   7/21/2021 at PM   • fexofenadine-pseudoephedrine (ALLEGRA-D)  MG per tablet Take 1 tablet by mouth 2 times a day. 20 tablet 0 7/22/2021 at AM   • famotidine (PEPCID) 40 MG Tab Take 40 mg by mouth every day. Indications: Heartburn   7/21/2021 at AM   • warfarin (COUMADIN) 6 MG Tab Take 6-9 mg by mouth every day. 6mg Monday-Saturday  9mg on Sunday 7/21/2021 at PM   • nitroglycerin (NITROSTAT) 0.4 MG SL Tab Place 1 tablet under the tongue as needed for Chest Pain. 25 tablet 0 PRN at PRN   • albuterol 108 (90 Base) MCG/ACT Aero Soln inhalation aerosol Inhale 2 Puffs every 6 hours as needed for Shortness of Breath. 8.5 g 0 7/21/2021 at AFTERNOON   • acetaminophen (TYLENOL) 500 MG Tab Take 1,000 mg by mouth every 6 hours as needed (pain).   7/21/2021 at AFTERNOON   • furosemide (LASIX) 20 MG Tab Take 1 tablet by mouth 2 times a day. 60 tablet 3 7/21/2021 at PM   • potassium chloride SA (KDUR) 20 MEQ Tab CR Take 1 tablet by mouth every day. 30 tablet 3 7/21/2021 at AM   • montelukast (SINGULAIR) 10 MG Tab Take 1 Tab by mouth every bedtime. 30 Tab 11 7/21/2021 at PM   • atorvastatin (LIPITOR) 20 MG Tab Take 1 Tab by mouth every day. 30 Tab 1 7/21/2021 at PM   • tiotropium (SPIRIVA HANDIHALER) 18 MCG Cap Inhale 1 Cap by mouth every day. 30 Cap 3 7/21/2021 at AM   • aripiprazole (ABILIFY) 30 MG tablet Take 30 mg by mouth every morning.   7/21/2021 at AM       Medication Allergy:  Allergies   Allergen Reactions   • Amoxicillin Rash and Itching     Tolerates cephalosporins, pt reports that she gets a rash all over her body and gets itchy   • Penicillin G Rash and Itching     pt reports that she gets a rash all over her body and gets itchy       Assessment and Plan:  Principal Problem:    Acute on chronic respiratory failure (HCC) POA: Yes  Active Problems:    Chronic obstructive pulmonary disease with acute exacerbation (HCC) POA: Yes    Bipolar Disorder  POA: Yes    KATHIA and COPD overlap syndrome (HCC) POA: Yes    Class 3 severe obesity in adult (HCC) POA: Unknown    Tobacco use POA: Yes    Schizophrenia (HCC) POA: Yes    Paroxysmal A-fib (HCC) POA: Yes    HLD (hyperlipidemia) POA: Yes    Obesity hypoventilation syndrome (HCC) POA: Unknown  Resolved Problems:    * No resolved hospital problems. *    46 y/o F with PMH significant for Mild COPD (on 2L O2 at home, FEV1/FVC 89%, FEV1 70% per PFTs 2020) with overlap syndrome of KATHIA/OHS (failed BiPAP/CPAP, put on AVAPS), HFpEF with severe pulmonary HTN (RVSP 65 mmHg per TTE 2019), paroxysmal afib (on Warfarin, previously subtherapeutic), morbid obesity, bipolar disorder, schizophrenia, tobacco use disorder, and hypertension who presented 7/22/2021 with shortness of breath x 1 week which had worsened acutely in the last 2 days.    #Acute on chronic hypoxic respiratory failure  #Obesity hypoventilation syndrome  #Obstructive sleep apnea  #Mild COPD (on 2L O2 at home)  #Oxygen dependent  #Morbid obesity    I believe education for the patient is roy in emphasizing the importance of her current status given this is her 8th admission within the last year for the same issue. Per chart review, pt has been seeing Dr. Meraz in the past for her KATHIA/OHS issues. Last note from Dr. Meraz on 8/28/2021 showed that she had been clinically improving on AVAPS.    -Continue on AVAPS setting - pulmonary will order (discussed with RT Lizzie Oden)  -Guaifenesin + cough drops  -Continue azithromycin (last day 7/24)  -Continue duonebs, montelukast, methylprednisolone, tiotropium, RT consult  -Continue incentive spirometer (10x/hr while awake)  -Pt will need LABA down the line in the long-run  -Agree with CT angiogram for r/o PE  -Agree with palliative care consult; needs lengthy discussion for readmission prevention  -Education on the importance of compliance with AVAPS  -Smoking cessation counseling + resources  -Consider roflumilast  -Patient  needs to have regular follow up outpatient with pulmonologist and PCP  -Pulmonary rehab after discharge from hospital  -Consider repeat PFTs in outpatient setting    Thank you for this consult.    Padmini Jewell MD, MPH  [facility], PGY-2

## 2021-07-23 NOTE — ASSESSMENT & PLAN NOTE
INR subratherapeutic   INR goal 2-3  Will continue with Coumadin. I coordinate with pharmacy for coumadin dosing. INR target 2-3. Closely monitor any sign of bleeding.

## 2021-07-23 NOTE — PROGRESS NOTES
Patient remains admitted to hospital, will defer to inpatient dosing until patient discharged.  José Miguel Gallegos, PharmD, BCACP      Pt still in the ER  Hilda GuzmanD

## 2021-07-23 NOTE — PROGRESS NOTES
4 Eyes Skin Assessment Completed by JC Palomino and JC Lundberg.    Head WDL  Ears WDL  Nose WDL  Mouth WDL  Neck WDL  Breast/Chest Redness interdry applied for red/raw under breasts   Shoulder Blades WDL  Spine WDL  (R) Arm/Elbow/Hand WDL  (L) Arm/Elbow/Hand WDL  Abdomen WDL interdry applied for moisture  Groin WDL  Scrotum/Coccyx/Buttocks WDL  (R) Leg Rash discoloration  (L) Leg Rash discoloration  (R) Heel/Foot/Toe WDL  (L) Heel/Foot/Toe WDL          Devices In Places Nasal Cannula      Interventions In Place Gray Ear Foams    Possible Skin Injury No    Pictures Uploaded Into Epic N/A  Wound Consult Placed N/A  RN Wound Prevention Protocol Ordered No

## 2021-07-23 NOTE — ASSESSMENT & PLAN NOTE
Worsening sob with wheezing, multiple admissions. Likely sec to COPD exacerbations, KATHIA and OHS also play role. Use 2L O2 at home  ProBNP elevated 397  Procal: pending  CTA: no evidence of PE, consolidation or pleural effusion  Sputum culture ordered    Breathing treatments with duoneb, montelukast, pulmicort  Solu medrol  Abx: azithromycin, completed  Start lasix  RT consult  COPD coordinator  IS  Wean O2 as tolerated   Given multiple admissions, consult palliative for further GOC discussions and pulmonary for further evaluations  I discussed with pulmonary Dr. Lopez, patient failed both CPAP and BiPAP titration, and was placed on AVAPS with significant improvement in fatigue and sleepiness per Dr. Meraz's office note 8/28/20. AVAPS setting: EPAP 11cm of water, IPAP 24/15 cm of wter, vol 450ml, rate of 14, and 3L oxygen. Discussed with RT, tolerating with AVAPS  Outpatient pulmonary follow up and pulmonary rehab advised

## 2021-07-23 NOTE — CARE PLAN
The patient is Stable - Low risk of patient condition declining or worsening    Shift Goals  Clinical Goals: maintain satts  Patient Goals: breath better    Progress made toward(s) clinical / shift goals:     Patient is not progressing towards the following goals: in use, pt is on 2L NC and satts 91-93%. Moderate dyspnea noted with ambulation. Pt is receiving breathing treatments, RT protocol.

## 2021-07-23 NOTE — THERAPY
"Occupational Therapy   Initial Evaluation     Patient Name: Lorene Haro  Age:  45 y.o., Sex:  female  Medical Record #: [id number]  Today's Date: 7/23/2021     Precautions  Precautions: Fall Risk    Assessment  Patient is 45 y.o. female with a diagnosis of COPD exacerbation. Hx of schizophrenia. She has had an admission once a month since March 2021. Additional factors influencing patient status / progress: weakness, fatigue, impaired balance, impaired safety awareness.      Plan    Recommend Occupational Therapy 3 times per week until therapy goals are met for the following treatments:  Adaptive Equipment, Cognitive Skill Development, Neuro Re-Education / Balance, Self Care/Activities of Daily Living, Therapeutic Activities and Therapeutic Exercises.    DC Equipment Recommendations: Unable to determine at this time  Discharge Recommendations: Recommend post-acute placement for additional occupational therapy services prior to discharge home     Subjective    \"Can I go back to bed now?\"     Objective       07/23/21 1013   Prior Living Situation   Prior Services Home-Independent;Intermittent Physical Support for ADL Per Service   Housing / Facility 2 Story Apartment / Condo   Elevator Yes   Bathroom Set up Bathtub / Shower Combination;Grab Bars;Shower Chair   Equipment Owned 4-Wheel Walker;Tub / Shower Seat;Grab Bar(s) In Tub / Shower   Lives with - Patient's Self Care Capacity Alone and Unable to Care For Self   Comments pt not very accurate historian today, however per last admit it appears she has assistance from some sort of rep for IADLs.   Prior Level of ADL Function   Self Feeding Independent   Grooming / Hygiene Independent   Bathing Independent   Dressing Independent   Toileting Independent   Prior Level of IADL Function   Medication Management Requires Assist   Laundry Requires Assist   Kitchen Mobility Requires Assist   Finances Requires Assist   Home Management Requires Assist   Shopping Requires " Assist   Prior Level Of Mobility Independent With Device in Community;Independent With Device in Home   Driving / Transportation Relatives / Others Provide Transportation   Occupation (Pre-Hospital Vocational) Not Employed   Precautions   Precautions Fall Risk   Pain 0 - 10 Group   Therapist Pain Assessment Nurse Notified  (c/o neck pain)   Cognition    Cognition / Consciousness X   Speech/ Communication Delayed Responses   Level of Consciousness Alert   Safety Awareness Impaired   Attention Impaired   Comments pleasant and cooperative, mumbling throughout difficult to understand. appears to have some sort of developmental or cognitive delay   Active ROM Upper Body   Active ROM Upper Body  WDL   Strength Upper Body   Upper Body Strength  WDL   Balance Assessment   Sitting Balance (Static) Fair   Sitting Balance (Dynamic) Fair -   Standing Balance (Static) Fair -   Standing Balance (Dynamic) Poor +   Weight Shift Sitting Fair   Weight Shift Standing Fair   Comments w/ HHA   Bed Mobility    Supine to Sit Moderate Assist   Scooting Minimal Assist   ADL Assessment   Grooming Seated;Moderate Assist  (oral care, wash face, brush hair)   Lower Body Dressing Maximal Assist   How much help from another person does the patient currently need...   Putting on and taking off regular lower body clothing? 2   Bathing (including washing, rinsing, and drying)? 2   Toileting, which includes using a toilet, bedpan, or urinal? 2   Putting on and taking off regular upper body clothing? 2   Taking care of personal grooming such as brushing teeth? 2   Eating meals? 3   6 Clicks Daily Activity Score 13   Functional Mobility   Sit to Stand Minimal Assist   Bed, Chair, Wheelchair Transfer Minimal Assist   Transfer Method Stand Step   Mobility EOB>Chair   Comments w/ HHA   Patient / Family Goals   Patient / Family Goal #1 to go home   Short Term Goals   Short Term Goal # 1 pt will demo toilet txf with supv   Short Term Goal # 2 pt will dress  LB with supv and AE prn   Short Term Goal # 3 pt will groom seated at the sink w/ supv

## 2021-07-23 NOTE — ASSESSMENT & PLAN NOTE
Per pulmonary, patient failed both CPAP and BiPAP titration, and was placed on AVAPS with significant improvement in fatigue and sleepiness per Dr. Meraz's office note 8/28/20. AVAPS setting: EPAP 11cm of water, IPAP 24/15 cm of water, vol 450ml, rate of 14, and 3L oxygen.

## 2021-07-23 NOTE — CARE PLAN
Problem: Impaired Gas Exchange  Goal: Patient will demonstrate improved ventilation and adequate oxygenation and participate in treatment regimen within the level of ability/situation.  Outcome: Progressing     Problem: Self Care  Goal: Patient will have the ability to perform ADLs independently or with assistance (bathe, groom, dress, toilet and feed)  Outcome: Progressing       The patient is Watcher - Medium risk of patient condition declining or worsening    Shift Goals  Clinical Goals: Maintain SPo2  Patient Goals: Decrease work of breathing effort    Progress made toward(s) clinical / shift goals:  Patient has maintained Spo2 Goal for COPD throughout the duration of the shift.     Patient is not progressing towards the following goals: Patient refuses shower.

## 2021-07-23 NOTE — DIETARY
NUTRITION SERVICES: BMI - Pt with BMI >40 (=Body mass index is 53.11 kg/m².), Class III obesity. Weight loss counseling not appropriate in acute care setting. RECOMMEND - If appropriate at DC please refer to outpatient nutrition services for weight management.

## 2021-07-23 NOTE — PROGRESS NOTES
Hospital Medicine Daily Progress Note    Date of Service  7/23/2021    Chief Complaint  Lorene Haro is a 45 y.o. female admitted 7/22/2021 with sob    Hospital Course  45 y.o. female with past medical history of COPD on 2 L home oxygen, hypertension, schizophrenia, and paroxysmal A. Fib on coumadin, morbid obesity, who presented 7/22/2021 with shortness of breath.  Patient reports she has had worsening shortness of breath and cough for the last week but progressively worsened over the last 2 days.  Patient reports wheezing and phlegm production. Patient denies chest pain, fevers, chills, nausea, or diarrhea.  Patient does admit to increasing weakness with difficulty walking around at home with her walker.  Patient reports she has been using all of her home inhalers regularly.  Of note patient was admitted 1 month ago for episode of chest pain with negative work-up.  She has had multiple ER visits for her COPD.    CXR: bilateral interstitial prominence, no focal consolidation  ProBNP: 397  Procal: pending  She is on duoneb, Spiriva, flonase, solumedrol  Ordered CTA to rule out PE  Given multiple admissions, consult pulmonary and palliative. Discussed with pulmonary, patient failed both CPAP and BiPAP titration, and was placed on AVAPS with significant improvement in fatigue and sleepiness per Dr. Meraz's office note 8/28/20. AVAPS setting: EPAP 11cm of water, IPAP 24/15 cm of wter, vol 450ml, rate of 14, and 3L oxygen.     Interval Problem Update  Reports sob and wheezing and generalized weakness  Denies fever, chills, nausea, vomiting, abdominal pain     I have personally seen and examined the patient at bedside. I discussed the plan of care with patient, bedside RN, charge RN,  and pharmacy.    Consultants/Specialty  palliative care and pulmonary    Code Status  Full Code    Disposition  Patient is not medically cleared.   Anticipate discharge to to home with organized home healthcare and close  Wound Evaluated By: [facility] outpatient follow-up.  I have placed the appropriate orders for post-discharge needs.    Review of Systems  Review of Systems   Constitutional: Positive for malaise/fatigue.   Respiratory: Positive for cough, sputum production, shortness of breath and wheezing.    Cardiovascular: Positive for leg swelling.   Neurological: Positive for weakness.   All other systems reviewed and are negative.       Physical Exam  Temp:  [36.4 °C (97.6 °F)-36.8 °C (98.3 °F)] 36.4 °C (97.6 °F)  Pulse:  [68-88] 68  Resp:  [16-36] 17  BP: (104-137)/(55-63) 112/58  SpO2:  [90 %-97 %] 94 %    Physical Exam  Vitals and nursing note reviewed.   Constitutional:       Appearance: Normal appearance. She is obese. She is ill-appearing.   HENT:      Head: Normocephalic and atraumatic.      Nose: Nose normal.      Mouth/Throat:      Pharynx: Oropharynx is clear.   Eyes:      Extraocular Movements: Extraocular movements intact.      Conjunctiva/sclera: Conjunctivae normal.      Pupils: Pupils are equal, round, and reactive to light.   Cardiovascular:      Rate and Rhythm: Normal rate and regular rhythm.      Pulses: Normal pulses.      Heart sounds: Normal heart sounds.   Pulmonary:      Effort: Pulmonary effort is normal.      Breath sounds: Wheezing and rales present.      Comments: O2 supplements  Abdominal:      General: Abdomen is flat. Bowel sounds are normal. There is no distension.      Palpations: Abdomen is soft.      Tenderness: There is no abdominal tenderness.   Musculoskeletal:         General: Normal range of motion.      Cervical back: Normal range of motion and neck supple.      Right lower leg: Edema present.      Left lower leg: Edema present.   Skin:     General: Skin is warm.   Neurological:      General: No focal deficit present.      Mental Status: She is alert and oriented to person, place, and time. Mental status is at baseline.      Motor: Weakness (4/5 stength b/l LE ) present.   Psychiatric:         Mood and Affect: Mood  normal.         Behavior: Behavior normal.         Fluids  No intake or output data in the 24 hours ending 07/23/21 1042    Laboratory  Recent Labs     07/22/21  1222 07/23/21  0614   WBC 4.6* 3.6*   RBC 4.18* 4.26   HEMOGLOBIN 11.8* 11.7*   HEMATOCRIT 40.1 41.2   MCV 95.9 96.7   MCH 28.2 27.5   MCHC 29.4* 28.4*   RDW 56.5* 55.3*   PLATELETCT 210 219   MPV 10.9 11.0     Recent Labs     07/22/21  1222 07/23/21  0614   SODIUM 147* 143   POTASSIUM 4.3 4.8   CHLORIDE 107 106   CO2 32 32   GLUCOSE 95 161*   BUN 7* 11   CREATININE 0.92 0.90   CALCIUM 8.7 9.2     Recent Labs     07/22/21  1109 07/22/21  1222 07/23/21  0614   INR 1.80* 1.65* 1.59*               Imaging  DX-CHEST-PORTABLE (1 VIEW)   Final Result      Cardiomegaly and bilateral interstitial prominence, likely edema. No focal consolidation or pleural effusions.           Assessment/Plan  * Acute on chronic respiratory failure (HCC)- (present on admission)  Assessment & Plan  Worsening sob with wheezing, multiple admissions. Likely sec to COPD exacerbations, KATHIA and OHS also play role. Use 2L O2 at home  ProBNP elevated 397  Procal: pending  She has not had CT chest evaluation prior, will check CTA    Breathing treatments with duoneb, montelukast, fluticasone and Spiriva  Solu medrol  Abx: azithromycin  Start lasix  RT consult  COPD coordinator  IS  Wean O2 as tolerated   Given multiple admissions, consult palliative for further GOC discussions and pulmonary for further evaluations  I discussed with pulmonary Dr. Lopez, patient failed both CPAP and BiPAP titration, and was placed on AVAPS with significant improvement in fatigue and sleepiness per Dr. Meraz's office note 8/28/20. AVAPS setting: EPAP 11cm of water, IPAP 24/15 cm of wter, vol 450ml, rate of 14, and 3L oxygen. Discussed with RT, order in      KATHIA and COPD overlap syndrome (HCC)- (present on admission)  Assessment & Plan  CPAP nightly    Chronic obstructive pulmonary disease with acute  exacerbation (HCC)- (present on admission)  Assessment & Plan  Hx of COPD, on 2L home oxygen  See above plans    Class 3 severe obesity in adult (HCC)  Assessment & Plan  Life style modifications including healthy plant based diet and regular exercise recommended   Outpatient weight loss surgery recommended     Obesity hypoventilation syndrome (HCC)  Assessment & Plan  Also contributing to acute on chronic respiratory failure    HLD (hyperlipidemia)- (present on admission)  Assessment & Plan  Continue statin    Bipolar Disorder- (present on admission)  Assessment & Plan  Continue home meds    Paroxysmal A-fib (HCC)- (present on admission)  Assessment & Plan  INR subratherapeutic   INR goal 2-3  Will continue with Coumadin. I coordinate with pharmacy for coumadin dosing. INR target 2-3. Closely monitor any sign of bleeding.      Schizophrenia (HCC)- (present on admission)  Assessment & Plan  Cont Abilify     Tobacco use- (present on admission)  Assessment & Plan  5 minutes on tobacco cessation counseling provided including nicotine patches, gum, and dangers of smoking. Discussed the risks of smoking including increased risk of heart disease, stroke, cancer and COPD. Discussed the benefits of quitting smoking.          VTE prophylaxis: therapeutic anticoagulation with coumadin         Add 46946 Cpt? (Important Note: In 2017 The Use Of 31416 Is Being Tracked By Cms To Determine Future Global Period Reimbursement For Global Periods): no Detail Level: Simple

## 2021-07-23 NOTE — PROGRESS NOTES
Assumed care of patient at 0700 from Georgette Wei RN. Patient is A&O x 4, states pain level is 0/10. Bed locked in lowest position with 3 rails up. Call light in place, belongings at bedside. Patient expresses no needs at this time and hourly rounding is in place.

## 2021-07-23 NOTE — PROGRESS NOTES
Patient refused shower, patient educated on the importance of self hygiene. Will continue to attempt to shower.

## 2021-07-23 NOTE — PROGRESS NOTES
Complete consult note to follow:    44 yo F with severe KATHIA (previously AHI>130), COPD/KATHIA overlap syndrome, multiple admissions with COPD exacerbations, chronic hypoxic respiratory failure, bipolar affective disorder, schizophrenia and morbid obesity who presented with another admission with COPD exacerbation. Currently on Nebz, steroids and azithromycin. Continue these treatment and supplemental oxygen to keep SpO2>90%. Agree with CTA to rule out PE. Awaiting imaging. Patient also had severe pulmonary HTN which is multifactorial with HFpEF, KATHIA, COPD all playing roles. Previously failed both CPAP and BiPAP titration, and was placed on AVAPS with significant improvement in fatigue and sleepiness per Dr. Meraz's office note 8/28/20. Continue AVAPS here if possible at home settings.

## 2021-07-23 NOTE — ED NOTES
Pt being transported to [place] [address] in stable condition via transport. pts belongings bag and walker being transported upstairs. meds and chart sent too

## 2021-07-23 NOTE — PROGRESS NOTES
Inpatient Anticoagulation Service Note    Date: 7/23/2021    Reason for Anticoagulation: Atrial Fibrillation   Target INR: 2.0 to 3.0  [id number] VASc Score: 1  HAS-BLED Score: 1   Hemoglobin Value: (!) 11.7  Hematocrit Value: 41.2  Lab Platelet Value: 219    INR from last 7 days     Date/Time INR Value    07/23/21 06:14:01  (!) 1.59    07/22/21 12:22:01  (!) 1.65        Dose from last 7 days     Date/Time Dose (mg)    07/23/21 1252  9    07/22/21 1812  9        Average Dose (mg):  (9 mg on Sundays and 6 mg AOD per [facility])  Significant Interactions: Antibiotics, Statin, Corticosteroids  Bridge Therapy: No     Reversal Agent Administered: Not Applicable  Comments: Pt on warfarin for paroxysmal afib and seen by [facility]. Pt's INR was sub-therapeutic upon admission, given low TCO3Os6-PYCh score (1), enoxaparin 40 mg subq daily was not started, INR trended down slightly overnight. Multiple warfarin-drug interactions noted, methylprednisolone and azithromycin are new with this admission. Will give warfarin 9 mg po tonight and repeat INR in AM.  Pharmacy will continue to monitor.    Plan:  Give warfarin 9 mg po tonight and repeat INR in AM. Pharmacy will continue to monitor.   Education Material Provided?: No (chronic therapy)  Pharmacist suggested discharge dosing: Consider resuming home dose of warfarin 9 mg on Sundays and 6 mg all other days with follow up INR within 48 hrs of discharge.      Rashmi Sarah, PharmD, BCOP

## 2021-07-23 NOTE — PROGRESS NOTES
Inpatient Anticoagulation Service Note    Date: 2021    Reason for Anticoagulation: Atrial Fibrillation   Target INR: 2.0 to 3.0  [id number] VASc Score: 1  HAS-BLED Score: 1   Hemoglobin Value: (!) 11.8  Hematocrit Value: 40.1  Lab Platelet Value: 210    INR from last 7 days     Date/Time INR Value    21 12:22:01  (!) 1.65        Dose from last 7 days     Date/Time Dose (mg)    21 1812  9        PTA dose: 9 mg on , 6 mg all other days  Significant Interactions: Antibiotics, Statin  Bridge Therapy: No    Reversal Agent Administered: Not Applicable  Comments: Warfarin continued for afib. H/H low but not concerning, plts WNL. INR subtherapeutic.    Plan:  9 mg tonight, INR in AM  Education Material Provided?: No (chronic therapy)  Pharmacist suggested discharge dosin mg Sundays, 6 mg all other days     Avinash Cohen, PharmD

## 2021-07-23 NOTE — PROGRESS NOTES
Pt arrived to unit, escorted by pt transport. Placed in bed with 4 assist. Assumed pt care. AA&OX4. Denies pain at this time. No s/s of discomfort or distress. Pt is requesting food. Explained unit routine to pt including call light use, bed alarm in place, visiting hours, smoking policy. Pt verbalized understanding. Bed in lowest position, bed locked, bed alarm on for safety, treaded socks in place, RN and CNA numbers provided, call light within reach.

## 2021-07-24 LAB
ANION GAP SERPL CALC-SCNC: 5 MMOL/L (ref 7–16)
BASOPHILS # BLD AUTO: 0 % (ref 0–1.8)
BASOPHILS # BLD: 0 K/UL (ref 0–0.12)
BUN SERPL-MCNC: 24 MG/DL (ref 8–22)
CALCIUM SERPL-MCNC: 9.2 MG/DL (ref 8.5–10.5)
CHLORIDE SERPL-SCNC: 102 MMOL/L (ref 96–112)
CO2 SERPL-SCNC: 31 MMOL/L (ref 20–33)
CREAT SERPL-MCNC: 1.14 MG/DL (ref 0.5–1.4)
EOSINOPHIL # BLD AUTO: 0 K/UL (ref 0–0.51)
EOSINOPHIL NFR BLD: 0 % (ref 0–6.9)
ERYTHROCYTE [DISTWIDTH] IN BLOOD BY AUTOMATED COUNT: 54.2 FL (ref 35.9–50)
GLUCOSE SERPL-MCNC: 174 MG/DL (ref 65–99)
HCT VFR BLD AUTO: 38.8 % (ref 37–47)
HGB BLD-MCNC: 11.2 G/DL (ref 12–16)
IMM GRANULOCYTES # BLD AUTO: 0.04 K/UL (ref 0–0.11)
IMM GRANULOCYTES NFR BLD AUTO: 0.6 % (ref 0–0.9)
INR PPP: 2.18 (ref 0.87–1.13)
LYMPHOCYTES # BLD AUTO: 0.48 K/UL (ref 1–4.8)
LYMPHOCYTES NFR BLD: 6.9 % (ref 22–41)
MCH RBC QN AUTO: 27.9 PG (ref 27–33)
MCHC RBC AUTO-ENTMCNC: 28.9 G/DL (ref 33.6–35)
MCV RBC AUTO: 96.5 FL (ref 81.4–97.8)
MONOCYTES # BLD AUTO: 0.16 K/UL (ref 0–0.85)
MONOCYTES NFR BLD AUTO: 2.3 % (ref 0–13.4)
NEUTROPHILS # BLD AUTO: 6.24 K/UL (ref 2–7.15)
NEUTROPHILS NFR BLD: 90.2 % (ref 44–72)
NRBC # BLD AUTO: 0 K/UL
NRBC BLD-RTO: 0 /100 WBC
PLATELET # BLD AUTO: 212 K/UL (ref 164–446)
PMV BLD AUTO: 11.4 FL (ref 9–12.9)
POTASSIUM SERPL-SCNC: 4.7 MMOL/L (ref 3.6–5.5)
PROCALCITONIN SERPL-MCNC: <0.05 NG/ML
PROTHROMBIN TIME: 23.6 SEC (ref 12–14.6)
RBC # BLD AUTO: 4.02 M/UL (ref 4.2–5.4)
SODIUM SERPL-SCNC: 138 MMOL/L (ref 135–145)
WBC # BLD AUTO: 6.9 K/UL (ref 4.8–10.8)

## 2021-07-24 PROCEDURE — 700102 HCHG RX REV CODE 250 W/ 637 OVERRIDE(OP): Performed by: STUDENT IN AN ORGANIZED HEALTH CARE EDUCATION/TRAINING PROGRAM

## 2021-07-24 PROCEDURE — 36415 COLL VENOUS BLD VENIPUNCTURE: CPT

## 2021-07-24 PROCEDURE — 94760 N-INVAS EAR/PLS OXIMETRY 1: CPT

## 2021-07-24 PROCEDURE — 84145 PROCALCITONIN (PCT): CPT

## 2021-07-24 PROCEDURE — 94640 AIRWAY INHALATION TREATMENT: CPT

## 2021-07-24 PROCEDURE — A9270 NON-COVERED ITEM OR SERVICE: HCPCS | Performed by: STUDENT IN AN ORGANIZED HEALTH CARE EDUCATION/TRAINING PROGRAM

## 2021-07-24 PROCEDURE — 85610 PROTHROMBIN TIME: CPT

## 2021-07-24 PROCEDURE — A9270 NON-COVERED ITEM OR SERVICE: HCPCS | Performed by: INTERNAL MEDICINE

## 2021-07-24 PROCEDURE — 99233 SBSQ HOSP IP/OBS HIGH 50: CPT | Performed by: STUDENT IN AN ORGANIZED HEALTH CARE EDUCATION/TRAINING PROGRAM

## 2021-07-24 PROCEDURE — 94660 CPAP INITIATION&MGMT: CPT

## 2021-07-24 PROCEDURE — 770001 HCHG ROOM/CARE - MED/SURG/GYN PRIV*

## 2021-07-24 PROCEDURE — 87070 CULTURE OTHR SPECIMN AEROBIC: CPT

## 2021-07-24 PROCEDURE — 80048 BASIC METABOLIC PNL TOTAL CA: CPT

## 2021-07-24 PROCEDURE — 85025 COMPLETE CBC W/AUTO DIFF WBC: CPT

## 2021-07-24 PROCEDURE — 700102 HCHG RX REV CODE 250 W/ 637 OVERRIDE(OP): Performed by: INTERNAL MEDICINE

## 2021-07-24 PROCEDURE — 99255 IP/OBS CONSLTJ NEW/EST HI 80: CPT | Performed by: INTERNAL MEDICINE

## 2021-07-24 PROCEDURE — 700101 HCHG RX REV CODE 250: Performed by: INTERNAL MEDICINE

## 2021-07-24 PROCEDURE — 700111 HCHG RX REV CODE 636 W/ 250 OVERRIDE (IP): Performed by: INTERNAL MEDICINE

## 2021-07-24 PROCEDURE — 94669 MECHANICAL CHEST WALL OSCILL: CPT

## 2021-07-24 RX ORDER — WARFARIN SODIUM 6 MG/1
6 TABLET ORAL
Status: COMPLETED | OUTPATIENT
Start: 2021-07-24 | End: 2021-07-24

## 2021-07-24 RX ORDER — BUDESONIDE 0.25 MG/2ML
0.25 INHALANT ORAL
Status: DISCONTINUED | OUTPATIENT
Start: 2021-07-24 | End: 2021-07-26

## 2021-07-24 RX ORDER — GUAIFENESIN/DEXTROMETHORPHAN 100-10MG/5
5 SYRUP ORAL EVERY 6 HOURS PRN
Status: DISCONTINUED | OUTPATIENT
Start: 2021-07-24 | End: 2021-07-27 | Stop reason: HOSPADM

## 2021-07-24 RX ADMIN — METHYLPREDNISOLONE SODIUM SUCCINATE 40 MG: 40 INJECTION, POWDER, FOR SOLUTION INTRAMUSCULAR; INTRAVENOUS at 05:38

## 2021-07-24 RX ADMIN — ATORVASTATIN CALCIUM 20 MG: 20 TABLET, FILM COATED ORAL at 17:25

## 2021-07-24 RX ADMIN — ARIPIPRAZOLE 30 MG: 10 TABLET ORAL at 05:38

## 2021-07-24 RX ADMIN — TIOTROPIUM BROMIDE INHALATION SPRAY 5 MCG: 3.12 SPRAY, METERED RESPIRATORY (INHALATION) at 19:41

## 2021-07-24 RX ADMIN — FAMOTIDINE 40 MG: 20 TABLET ORAL at 05:38

## 2021-07-24 RX ADMIN — WARFARIN SODIUM 6 MG: 6 TABLET ORAL at 17:26

## 2021-07-24 RX ADMIN — GUAIFENESIN AND DEXTROMETHORPHAN 5 ML: 100; 10 SYRUP ORAL at 08:39

## 2021-07-24 RX ADMIN — GUAIFENESIN 600 MG: 600 TABLET, EXTENDED RELEASE ORAL at 17:25

## 2021-07-24 RX ADMIN — FUROSEMIDE 20 MG: 20 TABLET ORAL at 15:52

## 2021-07-24 RX ADMIN — FUROSEMIDE 20 MG: 20 TABLET ORAL at 05:38

## 2021-07-24 RX ADMIN — FLUTICASONE PROPIONATE 100 MCG: 50 SPRAY, METERED NASAL at 06:20

## 2021-07-24 RX ADMIN — MONTELUKAST 10 MG: 10 TABLET, FILM COATED ORAL at 20:04

## 2021-07-24 RX ADMIN — GUAIFENESIN 600 MG: 600 TABLET, EXTENDED RELEASE ORAL at 05:38

## 2021-07-24 RX ADMIN — FLUTICASONE PROPIONATE 100 MCG: 50 SPRAY, METERED NASAL at 17:25

## 2021-07-24 RX ADMIN — GUAIFENESIN AND DEXTROMETHORPHAN 5 ML: 100; 10 SYRUP ORAL at 15:52

## 2021-07-24 RX ADMIN — IPRATROPIUM BROMIDE AND ALBUTEROL SULFATE 3 ML: .5; 2.5 SOLUTION RESPIRATORY (INHALATION) at 21:33

## 2021-07-24 RX ADMIN — IPRATROPIUM BROMIDE AND ALBUTEROL SULFATE 3 ML: .5; 2.5 SOLUTION RESPIRATORY (INHALATION) at 07:26

## 2021-07-24 RX ADMIN — Medication 1 LOZENGE: at 07:08

## 2021-07-24 RX ADMIN — METHYLPREDNISOLONE SODIUM SUCCINATE 40 MG: 40 INJECTION, POWDER, FOR SOLUTION INTRAMUSCULAR; INTRAVENOUS at 17:25

## 2021-07-24 RX ADMIN — IPRATROPIUM BROMIDE AND ALBUTEROL SULFATE 3 ML: .5; 2.5 SOLUTION RESPIRATORY (INHALATION) at 15:55

## 2021-07-24 RX ADMIN — TIOTROPIUM BROMIDE INHALATION SPRAY 5 MCG: 3.12 SPRAY, METERED RESPIRATORY (INHALATION) at 07:32

## 2021-07-24 RX ADMIN — AZITHROMYCIN MONOHYDRATE 500 MG: 250 TABLET ORAL at 05:38

## 2021-07-24 RX ADMIN — METHYLPREDNISOLONE SODIUM SUCCINATE 40 MG: 40 INJECTION, POWDER, FOR SOLUTION INTRAMUSCULAR; INTRAVENOUS at 11:25

## 2021-07-24 RX ADMIN — IPRATROPIUM BROMIDE AND ALBUTEROL SULFATE 3 ML: .5; 2.5 SOLUTION RESPIRATORY (INHALATION) at 11:50

## 2021-07-24 RX ADMIN — POTASSIUM CHLORIDE 20 MEQ: 1500 TABLET, EXTENDED RELEASE ORAL at 17:25

## 2021-07-24 ASSESSMENT — ENCOUNTER SYMPTOMS
COUGH: 1
WEAKNESS: 1
VOMITING: 0
HEMOPTYSIS: 0
SENSORY CHANGE: 0
SPEECH CHANGE: 0
SPUTUM PRODUCTION: 1
SHORTNESS OF BREATH: 1
NERVOUS/ANXIOUS: 1
DOUBLE VISION: 0
DIAPHORESIS: 1
WHEEZING: 1
WEIGHT LOSS: 0
SORE THROAT: 1
PND: 1
FALLS: 0
FOCAL WEAKNESS: 0
NAUSEA: 0

## 2021-07-24 ASSESSMENT — LIFESTYLE VARIABLES: SUBSTANCE_ABUSE: 0

## 2021-07-24 ASSESSMENT — PAIN DESCRIPTION - PAIN TYPE
TYPE: ACUTE PAIN
TYPE: ACUTE PAIN

## 2021-07-24 NOTE — CARE PLAN
The patient is Watcher - Medium risk of patient condition declining or worsening    Shift Goals  Clinical Goals: maintain spO2  Patient Goals: Decrease work of breathing effort    Progress made toward(s) clinical / shift goals:        Problem: Ineffective Airway Clearance  Goal: Patient will maintain patent airway with clear/clearing breath sounds  Outcome: Progressing    Pt has productive cough, able to clear secretions. 3-4L oxygen throughout the day.     Problem: Pain - Standard  Goal: Alleviation of pain or a reduction in pain to the patient’s comfort goal  Outcome: Progressing    Pt c/o chest pain d/t coughing, tylenol given and effective.       Patient is not progressing towards the following goals:

## 2021-07-24 NOTE — PROGRESS NOTES
Pt A&Ox4. 3-4L oxygen on throughout the day. Very anxious this morning. Increased coughing with large amounts of phlegm production. Culture sent. Cough medication ordered and given, seems to help. Pt repositioned throughout the shift for comfort. Purewick in place. Continued diuretics. Denies need for PRN pain medication.

## 2021-07-24 NOTE — PROGRESS NOTES
Hospital Medicine Daily Progress Note    Date of Service  7/24/2021    Chief Complaint  Lorene Haro is a 45 y.o. female admitted 7/22/2021 with sob    Hospital Course  45 y.o. female with past medical history of COPD on 2 L home oxygen, hypertension, schizophrenia, and paroxysmal A. Fib on coumadin, morbid obesity, who presented 7/22/2021 with shortness of breath.  Patient reports she has had worsening shortness of breath and cough for the last week but progressively worsened over the last 2 days.  Patient reports wheezing and phlegm production. Patient denies chest pain, fevers, chills, nausea, or diarrhea.  Patient does admit to increasing weakness with difficulty walking around at home with her walker.  Patient reports she has been using all of her home inhalers regularly.  Of note patient was admitted 1 month ago for episode of chest pain with negative work-up.  She has had multiple ER visits for her COPD.    CXR: bilateral interstitial prominence, no focal consolidation  ProBNP: 397  Procal: pending  She is on duoneb, Spiriva, flonase, solumedrol  CTA: no evidence of PE, consolidation or pleural effusion  Given multiple admissions, consult pulmonary and palliative. Discussed with pulmonary, patient failed both CPAP and BiPAP titration, and was placed on AVAPS with significant improvement in fatigue and sleepiness per Dr. Meraz's office note 8/28/20. AVAPS setting: EPAP 11cm of water, IPAP 24/15 cm of water, vol 450ml, rate of 14, and 3L oxygen.     Interval Problem Update  Patient was noted have unstopped coughing with whitish sputum.   She tolerated AVAPS last night.     Reports sob and wheezing and generalized weakness  Denies fever, chills, nausea, vomiting, abdominal pain     I have personally seen and examined the patient at bedside. I discussed the plan of care with patient, bedside RN, charge RN, , pharmacy and pulmonary.    Consultants/Specialty  palliative care and pulmonary    Code  Status  Full Code    Disposition  Patient is not medically cleared.   Anticipate discharge to to home with organized home healthcare and close outpatient follow-up.  I have placed the appropriate orders for post-discharge needs.    Review of Systems  Review of Systems   Constitutional: Positive for malaise/fatigue.   Respiratory: Positive for cough, sputum production, shortness of breath and wheezing.    Cardiovascular: Positive for leg swelling.   Neurological: Positive for weakness.   All other systems reviewed and are negative.       Physical Exam  Temp:  [36.1 °C (97 °F)-36.4 °C (97.5 °F)] 36.2 °C (97.1 °F)  Pulse:  [60-88] 62  Resp:  [16-20] 17  BP: (110-131)/(61-69) 110/68  SpO2:  [91 %-98 %] 94 %    Physical Exam  Vitals and nursing note reviewed.   Constitutional:       Appearance: Normal appearance. She is obese. She is ill-appearing.   HENT:      Head: Normocephalic and atraumatic.      Nose: Nose normal.      Mouth/Throat:      Pharynx: Oropharynx is clear.   Eyes:      Extraocular Movements: Extraocular movements intact.      Conjunctiva/sclera: Conjunctivae normal.      Pupils: Pupils are equal, round, and reactive to light.   Cardiovascular:      Rate and Rhythm: Normal rate and regular rhythm.      Pulses: Normal pulses.      Heart sounds: Normal heart sounds.   Pulmonary:      Effort: Pulmonary effort is normal.      Breath sounds: Wheezing and rales present.      Comments: O2 supplements  Abdominal:      General: Abdomen is flat. Bowel sounds are normal. There is no distension.      Palpations: Abdomen is soft.      Tenderness: There is no abdominal tenderness.   Musculoskeletal:         General: Normal range of motion.      Cervical back: Normal range of motion and neck supple.      Right lower leg: Edema present.      Left lower leg: Edema present.   Skin:     General: Skin is warm.   Neurological:      General: No focal deficit present.      Mental Status: She is alert and oriented to person,  place, and time. Mental status is at baseline.      Motor: Weakness (4/5 stength b/l LE ) present.   Psychiatric:         Mood and Affect: Mood normal.         Behavior: Behavior normal.         Fluids    Intake/Output Summary (Last 24 hours) at 7/24/2021 1057  Last data filed at 7/24/2021 0900  Gross per 24 hour   Intake 1440 ml   Output 800 ml   Net 640 ml       Laboratory  Recent Labs     07/22/21  1222 07/23/21  0614 07/24/21  0419   WBC 4.6* 3.6* 6.9   RBC 4.18* 4.26 4.02*   HEMOGLOBIN 11.8* 11.7* 11.2*   HEMATOCRIT 40.1 41.2 38.8   MCV 95.9 96.7 96.5   MCH 28.2 27.5 27.9   MCHC 29.4* 28.4* 28.9*   RDW 56.5* 55.3* 54.2*   PLATELETCT 210 219 212   MPV 10.9 11.0 11.4     Recent Labs     07/22/21  1222 07/23/21  0614 07/24/21  0419   SODIUM 147* 143 138   POTASSIUM 4.3 4.8 4.7   CHLORIDE 107 106 102   CO2 32 32 31   GLUCOSE 95 161* 174*   BUN 7* 11 24*   CREATININE 0.92 0.90 1.14   CALCIUM 8.7 9.2 9.2     Recent Labs     07/22/21  1222 07/23/21  0614 07/24/21  0419   INR 1.65* 1.59* 2.18*               Imaging  CT-CTA CHEST PULMONARY ARTERY W/ RECONS   Final Result      1.  There is no CT evidence of acute pulmonary embolism.   2.  There are new areas of dependent airspace disease most consistent with atelectasis.   3.  There is no evidence of pulmonary edema, pleural effusion or failure.   4.  Nonspecific mediastinal lymph nodes are relatively unchanged and are likely reactive.            DX-CHEST-PORTABLE (1 VIEW)   Final Result      Cardiomegaly and bilateral interstitial prominence, likely edema. No focal consolidation or pleural effusions.           Assessment/Plan  * Acute on chronic respiratory failure (HCC)- (present on admission)  Assessment & Plan  Worsening sob with wheezing, multiple admissions. Likely sec to COPD exacerbations, KATHIA and OHS also play role. Use 2L O2 at home  ProBNP elevated 397  Procal: pending  CTA: no evidence of PE, consolidation or pleural effusion  Sputum culture  ordered    Breathing treatments with duoneb, montelukast, fluticasone and Spiriva  Solu medrol  Abx: azithromycin  Start lasix  RT consult  COPD coordinator  IS  Wean O2 as tolerated   Given multiple admissions, consult palliative for further GOC discussions and pulmonary for further evaluations  I discussed with pulmonary Dr. Lopez, patient failed both CPAP and BiPAP titration, and was placed on AVAPS with significant improvement in fatigue and sleepiness per Dr. Meraz's office note 8/28/20. AVAPS setting: EPAP 11cm of water, IPAP 24/15 cm of wter, vol 450ml, rate of 14, and 3L oxygen. Discussed with RT, order in      KATHIA and COPD overlap syndrome (HCC)- (present on admission)  Assessment & Plan  Per pulmonary, patient failed both CPAP and BiPAP titration, and was placed on AVAPS with significant improvement in fatigue and sleepiness per Dr. Meraz's office note 8/28/20. AVAPS setting: EPAP 11cm of water, IPAP 24/15 cm of water, vol 450ml, rate of 14, and 3L oxygen.     Chronic obstructive pulmonary disease with acute exacerbation (HCC)- (present on admission)  Assessment & Plan  Hx of COPD, on 2L home oxygen  See above plans    Class 3 severe obesity in adult (HCC)  Assessment & Plan  Life style modifications including healthy plant based diet and regular exercise recommended   Outpatient weight loss surgery recommended     Obesity hypoventilation syndrome (HCC)  Assessment & Plan  Also contributing to acute on chronic respiratory failure    HLD (hyperlipidemia)- (present on admission)  Assessment & Plan  Continue statin    Bipolar Disorder- (present on admission)  Assessment & Plan  Continue home meds    Paroxysmal A-fib (HCC)- (present on admission)  Assessment & Plan  INR subratherapeutic   INR goal 2-3  Will continue with Coumadin. I coordinate with pharmacy for coumadin dosing. INR target 2-3. Closely monitor any sign of bleeding.      Schizophrenia (HCC)- (present on admission)  Assessment & Plan  Cont  Abilify     Tobacco use- (present on admission)  Assessment & Plan  5 minutes on tobacco cessation counseling provided including nicotine patches, gum, and dangers of smoking. Discussed the risks of smoking including increased risk of heart disease, stroke, cancer and COPD. Discussed the benefits of quitting smoking.          VTE prophylaxis: therapeutic anticoagulation with coumadin

## 2021-07-24 NOTE — PROGRESS NOTES
Inpatient Anticoagulation Service Note    Date: 7/24/2021    Reason for Anticoagulation: Atrial Fibrillation   Target INR: 2.0 to 3.0  [id number] VASc Score: 1  HAS-BLED Score: 1   Hemoglobin Value: (!) 11.2  Hematocrit Value: 38.8  Lab Platelet Value: 212    INR from last 7 days     Date/Time INR Value    07/24/21 04:19:01  (!) 2.18    07/23/21 06:14:01  (!) 1.59    07/22/21 12:22:01  (!) 1.65        Dose from last 7 days     Date/Time Dose (mg)    07/24/21 1046  6    07/23/21 1252  9    07/22/21 1812  9        Average Dose (mg):  (9 mg on Sundays and 6 mg AOD per [facility])  Significant Interactions: Statin, Corticosteroids  Bridge Therapy: No     Reversal Agent Administered: Not Applicable    Comments: INR increased from 1.59 to 2.18 with x2 doses of warfarin 9mg. Will decrease dose to 6mg tonight and recheck INR tomorrow. Patient is being followed by outpatient AC clinic. Anticipating can resume home dose of 9mg on Sundays and 6mg AOD in 1-2 days. H/h and plt WNL. Per chart review, no s/sx of bleeding. DDI as noted above. Pharmacy will continue to monitor.    Plan:  6mg today. Recheck INR tomorrow   Education Material Provided?: No (chronic therapy)  Pharmacist suggested discharge dosing: Resume home dose of Warfarin 9mg on Sundays and 6mg AOD. Patient to follow up with outpatient AC clinic within 48 hrs of discharge for repeat INR.      Maureen Garcia, PharmD

## 2021-07-24 NOTE — CARE PLAN
The patient is Watcher - Medium risk of patient condition declining or worsening    Shift Goals  Clinical Goals: maintain spO2 >90t    Progress made toward(s) clinical / shift goals:  Pt's SpO2 is >90 on 4L of oxygen via NC. Used AVAPs at night. Routine respiratory medications given. Pt continues to have cough, medicated pt with PRN cough medication.    Patient is not progressing towards the following goals:

## 2021-07-24 NOTE — CASE COMMUNICATION
I agree with these changes.    ----- Message -----  From: Desiree Wyatt R.N.  Sent: 7/21/2021   1:08 PM PDT  To: Sony Fitzpatrick R.N.      Quality Review for 7/16/21 Recertification OASIS by MARCEL Wyatt RN on  July 21, 2021      Edits completed by MARCEL Wyatt RN:  1. Updated MAR with oxygen instructions  2. Removed F2F information

## 2021-07-24 NOTE — PROGRESS NOTES
Assume care of pt at 1900. Report received from day RN. Pt is A/O x4. Pain is 3/10, medicated pt per MAR. Pt is sitting up in bed. Bed in lowest and locked position, call light in reach, hourly rounding in place. Labs reviewed. Communication board updated. Will continue to monitor.

## 2021-07-25 LAB
INR PPP: 2.95 (ref 0.87–1.13)
PROTHROMBIN TIME: 29.8 SEC (ref 12–14.6)

## 2021-07-25 PROCEDURE — A9270 NON-COVERED ITEM OR SERVICE: HCPCS | Performed by: INTERNAL MEDICINE

## 2021-07-25 PROCEDURE — 94660 CPAP INITIATION&MGMT: CPT

## 2021-07-25 PROCEDURE — 94669 MECHANICAL CHEST WALL OSCILL: CPT

## 2021-07-25 PROCEDURE — 99497 ADVNCD CARE PLAN 30 MIN: CPT | Performed by: NURSE PRACTITIONER

## 2021-07-25 PROCEDURE — 36415 COLL VENOUS BLD VENIPUNCTURE: CPT

## 2021-07-25 PROCEDURE — 700111 HCHG RX REV CODE 636 W/ 250 OVERRIDE (IP): Performed by: STUDENT IN AN ORGANIZED HEALTH CARE EDUCATION/TRAINING PROGRAM

## 2021-07-25 PROCEDURE — 700101 HCHG RX REV CODE 250: Performed by: INTERNAL MEDICINE

## 2021-07-25 PROCEDURE — 700111 HCHG RX REV CODE 636 W/ 250 OVERRIDE (IP): Performed by: INTERNAL MEDICINE

## 2021-07-25 PROCEDURE — 700101 HCHG RX REV CODE 250: Performed by: STUDENT IN AN ORGANIZED HEALTH CARE EDUCATION/TRAINING PROGRAM

## 2021-07-25 PROCEDURE — 94640 AIRWAY INHALATION TREATMENT: CPT

## 2021-07-25 PROCEDURE — 99233 SBSQ HOSP IP/OBS HIGH 50: CPT | Performed by: STUDENT IN AN ORGANIZED HEALTH CARE EDUCATION/TRAINING PROGRAM

## 2021-07-25 PROCEDURE — 85610 PROTHROMBIN TIME: CPT

## 2021-07-25 PROCEDURE — A9270 NON-COVERED ITEM OR SERVICE: HCPCS | Performed by: STUDENT IN AN ORGANIZED HEALTH CARE EDUCATION/TRAINING PROGRAM

## 2021-07-25 PROCEDURE — 700102 HCHG RX REV CODE 250 W/ 637 OVERRIDE(OP): Performed by: STUDENT IN AN ORGANIZED HEALTH CARE EDUCATION/TRAINING PROGRAM

## 2021-07-25 PROCEDURE — 770001 HCHG ROOM/CARE - MED/SURG/GYN PRIV*

## 2021-07-25 PROCEDURE — 99233 SBSQ HOSP IP/OBS HIGH 50: CPT | Performed by: INTERNAL MEDICINE

## 2021-07-25 PROCEDURE — 700102 HCHG RX REV CODE 250 W/ 637 OVERRIDE(OP): Performed by: INTERNAL MEDICINE

## 2021-07-25 RX ORDER — METHYLPREDNISOLONE SODIUM SUCCINATE 40 MG/ML
40 INJECTION, POWDER, LYOPHILIZED, FOR SOLUTION INTRAMUSCULAR; INTRAVENOUS EVERY 12 HOURS
Status: DISCONTINUED | OUTPATIENT
Start: 2021-07-25 | End: 2021-07-27

## 2021-07-25 RX ORDER — IPRATROPIUM BROMIDE AND ALBUTEROL SULFATE 2.5; .5 MG/3ML; MG/3ML
3 SOLUTION RESPIRATORY (INHALATION)
Status: DISCONTINUED | OUTPATIENT
Start: 2021-07-25 | End: 2021-07-26

## 2021-07-25 RX ORDER — WARFARIN SODIUM 3 MG/1
3 TABLET ORAL
Status: DISCONTINUED | OUTPATIENT
Start: 2021-07-25 | End: 2021-07-25

## 2021-07-25 RX ORDER — METHYLPREDNISOLONE SODIUM SUCCINATE 40 MG/ML
40 INJECTION, POWDER, LYOPHILIZED, FOR SOLUTION INTRAMUSCULAR; INTRAVENOUS EVERY 8 HOURS
Status: DISCONTINUED | OUTPATIENT
Start: 2021-07-25 | End: 2021-07-25

## 2021-07-25 RX ADMIN — GUAIFENESIN 600 MG: 600 TABLET, EXTENDED RELEASE ORAL at 17:04

## 2021-07-25 RX ADMIN — IPRATROPIUM BROMIDE AND ALBUTEROL SULFATE 3 ML: .5; 2.5 SOLUTION RESPIRATORY (INHALATION) at 19:15

## 2021-07-25 RX ADMIN — GUAIFENESIN AND DEXTROMETHORPHAN 5 ML: 100; 10 SYRUP ORAL at 03:11

## 2021-07-25 RX ADMIN — Medication 1 LOZENGE: at 21:27

## 2021-07-25 RX ADMIN — IPRATROPIUM BROMIDE AND ALBUTEROL SULFATE 3 ML: .5; 2.5 SOLUTION RESPIRATORY (INHALATION) at 10:07

## 2021-07-25 RX ADMIN — IPRATROPIUM BROMIDE AND ALBUTEROL SULFATE 3 ML: .5; 2.5 SOLUTION RESPIRATORY (INHALATION) at 13:59

## 2021-07-25 RX ADMIN — POTASSIUM CHLORIDE 20 MEQ: 1500 TABLET, EXTENDED RELEASE ORAL at 17:04

## 2021-07-25 RX ADMIN — IPRATROPIUM BROMIDE AND ALBUTEROL SULFATE 3 ML: .5; 2.5 SOLUTION RESPIRATORY (INHALATION) at 06:24

## 2021-07-25 RX ADMIN — FLUTICASONE PROPIONATE 100 MCG: 50 SPRAY, METERED NASAL at 05:21

## 2021-07-25 RX ADMIN — Medication 1 LOZENGE: at 05:29

## 2021-07-25 RX ADMIN — ACETAMINOPHEN 650 MG: 325 TABLET, FILM COATED ORAL at 11:22

## 2021-07-25 RX ADMIN — METHYLPREDNISOLONE SODIUM SUCCINATE 40 MG: 40 INJECTION, POWDER, FOR SOLUTION INTRAMUSCULAR; INTRAVENOUS at 17:04

## 2021-07-25 RX ADMIN — MONTELUKAST 10 MG: 10 TABLET, FILM COATED ORAL at 20:36

## 2021-07-25 RX ADMIN — FAMOTIDINE 40 MG: 20 TABLET ORAL at 05:20

## 2021-07-25 RX ADMIN — TIOTROPIUM BROMIDE INHALATION SPRAY 5 MCG: 3.12 SPRAY, METERED RESPIRATORY (INHALATION) at 06:26

## 2021-07-25 RX ADMIN — BUDESONIDE 0.25 MG: 0.25 SUSPENSION RESPIRATORY (INHALATION) at 06:24

## 2021-07-25 RX ADMIN — FUROSEMIDE 20 MG: 20 TABLET ORAL at 17:04

## 2021-07-25 RX ADMIN — METHYLPREDNISOLONE SODIUM SUCCINATE 40 MG: 40 INJECTION, POWDER, FOR SOLUTION INTRAMUSCULAR; INTRAVENOUS at 05:21

## 2021-07-25 RX ADMIN — GUAIFENESIN AND DEXTROMETHORPHAN 5 ML: 100; 10 SYRUP ORAL at 20:37

## 2021-07-25 RX ADMIN — METHYLPREDNISOLONE SODIUM SUCCINATE 40 MG: 40 INJECTION, POWDER, FOR SOLUTION INTRAMUSCULAR; INTRAVENOUS at 00:35

## 2021-07-25 RX ADMIN — GUAIFENESIN AND DEXTROMETHORPHAN 5 ML: 100; 10 SYRUP ORAL at 14:27

## 2021-07-25 RX ADMIN — GUAIFENESIN 600 MG: 600 TABLET, EXTENDED RELEASE ORAL at 05:20

## 2021-07-25 RX ADMIN — FUROSEMIDE 20 MG: 20 TABLET ORAL at 05:20

## 2021-07-25 RX ADMIN — ATORVASTATIN CALCIUM 20 MG: 20 TABLET, FILM COATED ORAL at 17:04

## 2021-07-25 RX ADMIN — METHYLPREDNISOLONE SODIUM SUCCINATE 40 MG: 40 INJECTION, POWDER, FOR SOLUTION INTRAMUSCULAR; INTRAVENOUS at 11:19

## 2021-07-25 RX ADMIN — ARIPIPRAZOLE 30 MG: 10 TABLET ORAL at 05:20

## 2021-07-25 ASSESSMENT — ENCOUNTER SYMPTOMS
SPUTUM PRODUCTION: 1
DIAPHORESIS: 1
HEMOPTYSIS: 0
WEAKNESS: 1
SENSORY CHANGE: 0
NERVOUS/ANXIOUS: 1
FOCAL WEAKNESS: 0
COUGH: 1
FALLS: 0
DOUBLE VISION: 0
WHEEZING: 1
SHORTNESS OF BREATH: 1
PND: 1
SPEECH CHANGE: 0
WEIGHT LOSS: 0
NAUSEA: 0
VOMITING: 0

## 2021-07-25 ASSESSMENT — LIFESTYLE VARIABLES: SUBSTANCE_ABUSE: 0

## 2021-07-25 ASSESSMENT — PAIN DESCRIPTION - PAIN TYPE
TYPE: ACUTE PAIN

## 2021-07-25 NOTE — DISCHARGE PLANNING
*ATTN Discharge Planning team: This patient is currently on service with [facility]. Please submit a referral order and face-to-face prior to discharging the patient home. If you have any questions, contact our Transitional Care Specialist team at x [phone].

## 2021-07-25 NOTE — PROGRESS NOTES
Assume care of pt at 1900. Report received from day RN. Pt is A/O x4, episodes of talking to self. Denies pain or discomfort. Pt is sitting up in bed. Bed in lowest and locked position, call light in reach, hourly rounding in place. Labs reviewed. Communication board updated. Will continue to monitor.

## 2021-07-25 NOTE — PROGRESS NOTES
Inpatient Anticoagulation Service Note    Date: 7/25/2021    Reason for Anticoagulation: Atrial Fibrillation   Target INR: 2.0 to 3.0  [id number] VASc Score: 1  HAS-BLED Score: 1   Hemoglobin Value: (!) 11.2  Hematocrit Value: 38.8  Lab Platelet Value: 212    INR from last 7 days     Date/Time INR Value    07/25/21 0654  (!) 2.95    07/24/21 04:19:01  (!) 2.18    07/23/21 06:14:01  (!) 1.59    07/22/21 12:22:01  (!) 1.65        Dose from last 7 days     Date/Time Dose (mg)    07/25/21 1032  0    07/24/21 1046  6    07/23/21 1252  9    07/22/21 1812  9        Average Dose (mg):  9 mg on Sundays and 6 mg AOD per [facility]  Significant Interactions: Corticosteroids, Statin  Bridge Therapy: No  Reversal Agent Administered: Not Applicable    Comments: NNLs today but s/s of overt bleeding. Dose given last night. Significant bump in INR from two bolus doses and regular home dose. Due to DDI with corticosteroids, will plan on holding tonight as expect INR to continue to increase. INR ordered tomorrow AM.    Plan:  HOLD warfarin  Education Material Provided?: No (chronic therapy)  Pharmacist suggested discharge dosing: Warfarin 6 mg PO daily starting 7/26 with close follow-up post-discharge with an INR within 48 to 72 hours.       Thank you!    Denia Gregory, PharmD, BCPS

## 2021-07-25 NOTE — CARE PLAN
The patient is Watcher - Medium risk of patient condition declining or worsening     Shift Goals  Clinical Goals: maintain spO2  Patient Goals: Decrease work of breathing effort     Progress made toward(s) clinical / shift goals:          Problem: Ineffective Airway Clearance  Goal: Patient will maintain patent airway with clear/clearing breath sounds  Outcome: Progressing     Pt cough less productive today. 3-4L oxygen throughout the day.     Problem: Pain - Standard  Goal: Alleviation of pain or a reduction in pain to the patient’s comfort goal  Outcome: Progressing     Pt c/o pain to rib cage, tylenol given and effective.        Patient is not progressing towards the following goals:

## 2021-07-25 NOTE — CARE PLAN
The patient is Watcher - Medium risk of patient condition declining or worsening    Shift Goals  Clinical Goals: maintain SpO2    Progress made toward(s) clinical / shift goals:  Pt is currently on 3L of oxygen via NC. Tolerated AVAPs while asleep. Pt continues to have cough, medicated pt with PRN cough syrup. Encouraged pt to use incentive spirometer while awake.     Patient is not progressing towards the following goals:
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
## 2021-07-25 NOTE — PROGRESS NOTES
Hospital Medicine Daily Progress Note    Date of Service  7/25/2021    Chief Complaint  Lorene Haro is a 45 y.o. female admitted 7/22/2021 with sob    Hospital Course  45 y.o. female with past medical history of COPD on 2 L home oxygen, hypertension, schizophrenia, and paroxysmal A. Fib on coumadin, morbid obesity, who presented 7/22/2021 with shortness of breath.  Patient reports she has had worsening shortness of breath and cough for the last week but progressively worsened over the last 2 days.  Patient reports wheezing and phlegm production. Patient denies chest pain, fevers, chills, nausea, or diarrhea.  Patient does admit to increasing weakness with difficulty walking around at home with her walker.  Patient reports she has been using all of her home inhalers regularly.  Of note patient was admitted 1 month ago for episode of chest pain with negative work-up.  She has had multiple ER visits for her COPD.    CXR: bilateral interstitial prominence, no focal consolidation  ProBNP: 397  Procal: neg  She is on duoneb, flonase, solumedrol  CTA: no evidence of PE, consolidation or pleural effusion  Given multiple admissions, consult pulmonary and palliative. Discussed with pulmonary, patient failed both CPAP and BiPAP titration, and was placed on AVAPS with significant improvement in fatigue and sleepiness per Dr. Meraz's office note 8/28/20. AVAPS setting: EPAP 11cm of water, IPAP 24/15 cm of water, vol 450ml, rate of 14, and 3L oxygen.   PT/OT: post acute    Interval Problem Update  Patient's coughing is improved, still very wheezing and very tired  She tolerated AVAPS last night.   Denies fever, chills, nausea, vomiting     I have personally seen and examined the patient at bedside. I discussed the plan of care with patient, bedside RN, charge RN, , pharmacy and pulmonary.    Consultants/Specialty  palliative care and pulmonary    Code Status  Full Code    Disposition  Patient is not medically  cleared.   Anticipate discharge to to home with organized home healthcare and close outpatient follow-up.  I have placed the appropriate orders for post-discharge needs.    Review of Systems  Review of Systems   Constitutional: Positive for malaise/fatigue.   Respiratory: Positive for cough, sputum production, shortness of breath and wheezing.    Cardiovascular: Positive for leg swelling.   Neurological: Positive for weakness.   All other systems reviewed and are negative.       Physical Exam  Temp:  [36.1 °C (97 °F)-36.6 °C (97.9 °F)] 36.1 °C (97 °F)  Pulse:  [53-90] 56  Resp:  [16-22] 18  BP: (123-139)/(60-81) 128/72  SpO2:  [90 %-97 %] 95 %    Physical Exam  Vitals and nursing note reviewed.   Constitutional:       Appearance: Normal appearance. She is obese. She is ill-appearing.   HENT:      Head: Normocephalic and atraumatic.      Nose: Nose normal.      Mouth/Throat:      Pharynx: Oropharynx is clear.   Eyes:      Extraocular Movements: Extraocular movements intact.      Conjunctiva/sclera: Conjunctivae normal.      Pupils: Pupils are equal, round, and reactive to light.   Cardiovascular:      Rate and Rhythm: Normal rate and regular rhythm.      Pulses: Normal pulses.      Heart sounds: Normal heart sounds.   Pulmonary:      Effort: Pulmonary effort is normal.      Breath sounds: Wheezing and rales present.      Comments: O2 supplements  Abdominal:      General: Abdomen is flat. Bowel sounds are normal. There is no distension.      Palpations: Abdomen is soft.      Tenderness: There is no abdominal tenderness.   Musculoskeletal:         General: Normal range of motion.      Cervical back: Normal range of motion and neck supple.      Right lower leg: Edema present.      Left lower leg: Edema present.   Skin:     General: Skin is warm.   Neurological:      General: No focal deficit present.      Mental Status: She is alert and oriented to person, place, and time. Mental status is at baseline.      Motor:  Weakness (4/5 stength b/l LE ) present.   Psychiatric:         Mood and Affect: Mood normal.         Behavior: Behavior normal.         Fluids    Intake/Output Summary (Last 24 hours) at 7/25/2021 1130  Last data filed at 7/25/2021 0900  Gross per 24 hour   Intake 1200 ml   Output 2300 ml   Net -1100 ml       Laboratory  Recent Labs     07/22/21  1222 07/23/21  0614 07/24/21  0419   WBC 4.6* 3.6* 6.9   RBC 4.18* 4.26 4.02*   HEMOGLOBIN 11.8* 11.7* 11.2*   HEMATOCRIT 40.1 41.2 38.8   MCV 95.9 96.7 96.5   MCH 28.2 27.5 27.9   MCHC 29.4* 28.4* 28.9*   RDW 56.5* 55.3* 54.2*   PLATELETCT 210 219 212   MPV 10.9 11.0 11.4     Recent Labs     07/22/21  1222 07/23/21  0614 07/24/21  0419   SODIUM 147* 143 138   POTASSIUM 4.3 4.8 4.7   CHLORIDE 107 106 102   CO2 32 32 31   GLUCOSE 95 161* 174*   BUN 7* 11 24*   CREATININE 0.92 0.90 1.14   CALCIUM 8.7 9.2 9.2     Recent Labs     07/23/21  0614 07/24/21  0419 07/25/21  0654   INR 1.59* 2.18* 2.95*               Imaging  CT-CTA CHEST PULMONARY ARTERY W/ RECONS   Final Result      1.  There is no CT evidence of acute pulmonary embolism.   2.  There are new areas of dependent airspace disease most consistent with atelectasis.   3.  There is no evidence of pulmonary edema, pleural effusion or failure.   4.  Nonspecific mediastinal lymph nodes are relatively unchanged and are likely reactive.            DX-CHEST-PORTABLE (1 VIEW)   Final Result      Cardiomegaly and bilateral interstitial prominence, likely edema. No focal consolidation or pleural effusions.           Assessment/Plan  * Acute on chronic respiratory failure (HCC)- (present on admission)  Assessment & Plan  Worsening sob with wheezing, multiple admissions. Likely sec to COPD exacerbations, KATHIA and OHS also play role. Use 2L O2 at home  ProBNP elevated 397  Procal: pending  CTA: no evidence of PE, consolidation or pleural effusion  Sputum culture ordered    Breathing treatments with duoneb, montelukast,  fluticasone  Solu medrol  Abx: azithromycin, completed  Start lasix  RT consult  COPD coordinator  IS  Wean O2 as tolerated   Given multiple admissions, consult palliative for further GOC discussions and pulmonary for further evaluations  I discussed with pulmonary Dr. Lopez, patient failed both CPAP and BiPAP titration, and was placed on AVAPS with significant improvement in fatigue and sleepiness per Dr. Meraz's office note 8/28/20. AVAPS setting: EPAP 11cm of water, IPAP 24/15 cm of wter, vol 450ml, rate of 14, and 3L oxygen. Discussed with RT, tolerating with AVAPS      KATHIA and COPD overlap syndrome (HCC)- (present on admission)  Assessment & Plan  Per pulmonary, patient failed both CPAP and BiPAP titration, and was placed on AVAPS with significant improvement in fatigue and sleepiness per Dr. Meraz's office note 8/28/20. AVAPS setting: EPAP 11cm of water, IPAP 24/15 cm of water, vol 450ml, rate of 14, and 3L oxygen.     Chronic obstructive pulmonary disease with acute exacerbation (HCC)- (present on admission)  Assessment & Plan  Hx of COPD, on 2L home oxygen  See above plans    Class 3 severe obesity in adult (HCC)  Assessment & Plan  Life style modifications including healthy plant based diet and regular exercise recommended   Outpatient weight loss surgery recommended     Obesity hypoventilation syndrome (HCC)  Assessment & Plan  Also contributing to acute on chronic respiratory failure    HLD (hyperlipidemia)- (present on admission)  Assessment & Plan  Continue statin    Bipolar Disorder- (present on admission)  Assessment & Plan  Continue home meds    Paroxysmal A-fib (HCC)- (present on admission)  Assessment & Plan  INR subratherapeutic   INR goal 2-3  Will continue with Coumadin. I coordinate with pharmacy for coumadin dosing. INR target 2-3. Closely monitor any sign of bleeding.      Schizophrenia (HCC)- (present on admission)  Assessment & Plan  Cont Abilify     Tobacco use- (present on  admission)  Assessment & Plan  5 minutes on tobacco cessation counseling provided including nicotine patches, gum, and dangers of smoking. Discussed the risks of smoking including increased risk of heart disease, stroke, cancer and COPD. Discussed the benefits of quitting smoking.          VTE prophylaxis: therapeutic anticoagulation with coumadin

## 2021-07-25 NOTE — CONSULTS
"Pulmonary Follow-up Note    Patient ID:   Name:             Lorene Haro   YOB: 1975  Age:                 45 y.o.  female   MRN:               [id number]  Referring Provider: Romy Gallardo MD                                                  History of Present Illness:     Pt is a 44 y/o F with PMHx significant for COPD (however FEV1/FVC 89%, FEV1 70% per PFTs 2020), chronic oxygen use and on 2L O2 at home, Significant overlap syndrome with KATHIA/OHS/COPD (failed BiPAP/CPAP and recommended AVAPS), HFpEF with severe pulmonary HTN (RVSP 65 mmHg per TTE 2019), paroxysmal afib (on Warfarin, previously subtherapeutic), morbid obesity, bipolar disorder, schizophrenia, tobacco use disorder, and hypertension who presented 7/22/2021 with shortness of breath x 1 week which had worsened acutely in the last 2 days.She had wheezing, leg swelling, productive cough with yellow phlegm, as well as chest discomfort from persistent coughing. She had previously been admitted for similar symptoms (last discharge was 6/27/2021). Pt reports that she is compliant with all of her home medications including her warfarin. She has tried quitting smoking but \"It's just too hard to quit\". Pulmonary was consulted for multiple readmissions in the context of her 8th COPD exacerbation admission within the last year.    Interval hx:  7/25/21: Patient appears much better today. Sleepy. Wheeze noted. No overnight events. Tolerated AVAPS.       Review of Systems: Review of Systems   Constitutional: Positive for diaphoresis and malaise/fatigue. Negative for weight loss.   Eyes: Negative for double vision.   Respiratory: Positive for wheezing. Negative for hemoptysis.         Cough has significantly improved.    Cardiovascular: Positive for leg swelling and PND. Negative for chest pain.        +chest discomfort   Gastrointestinal: Negative for nausea and vomiting.   Musculoskeletal: Negative for falls.   Neurological: Positive for " "weakness. Negative for sensory change, speech change and focal weakness.   Psychiatric/Behavioral: Negative for substance abuse. The patient is nervous/anxious.        Past Medical History:   Past Medical History:   Diagnosis Date   • Asthma    • Bipolar 2 disorder (HCC)    • Chickenpox    • Chronic obstructive pulmonary disease (HCC)    • Hypertension    • On home oxygen therapy    • Other psychological stress    • Schizophrenic disorder (HCC)    • Yeast infection of the skin 4/1/2021       Past Surgical History:   Past Surgical History:   Procedure Laterality Date   • CHOLECYSTECTOMY     • COLECTOMY     • HYSTERECTOMY LAPAROSCOPY     • KNEE ARTHROSCOPY Left        Family History: family history includes Cancer in her sister; No Known Problems in her mother.    Social History:  reports that she has been smoking cigarettes. She has a 2.00 pack-year smoking history. She has never used smokeless tobacco. She reports that she does not drink alcohol and does not use drugs.    Objective:   Vitals/ General Appearance:   Weight/BMI: Body mass index is 53.11 kg/m².  /72   Pulse (!) 56   Temp 36.1 °C (97 °F) (Temporal)   Resp 18   Ht 1.549 m (5' 1\")   Wt (!) 127 kg (281 lb 1.4 oz)   SpO2 95%   Vitals:    07/25/21 0412 07/25/21 0627 07/25/21 0700 07/25/21 1008   BP: 136/81  128/72    Pulse: (!) 59 (!) 53 (!) 55 (!) 56   Resp: 18 16 17 18   Temp: 36.6 °C (97.9 °F)  36.1 °C (97 °F)    TempSrc: Temporal  Temporal    SpO2: 96% 97% 94% 95%   Weight:       Height:         Oxygen Therapy:  Pulse Oximetry: 95 %, O2 (LPM): 6, O2 Delivery Device: Silicone Nasal Cannula    Constitutional:   Ill-appearing, Well nourished, obese, unkempt, in somewhat acute distress  HENMT:  Normocephalic, Atraumatic, Oropharynx dry mucosal membranes, No oral exudates, Nose normal.  Wearing NC  Eyes:  EOMI, Conjunctiva normal, No discharge.  Neck:  Normal range of motion.  Cardiovascular:  Normal heart rate, Normal rhythm, No murmurs, 2+ edema " in b/l LE  Lungs:  Bilateral wheeze. Good respiratory effort.   Abdomen: Bowel sounds normal, Soft, protuberant  Skin: Warm, Dry, Chronic venous stasis changes in b/l LE  Neurologic: Alert & oriented x 4, No focal deficits noted, cranial nerves II through XII are grossly intact.  Psychiatric: Affect normal, Judgment normal, Mood normal.    Labs:  Recent Labs     07/23/21  0614 07/24/21  0419   WBC 3.6* 6.9   RBC 4.26 4.02*   HEMOGLOBIN 11.7* 11.2*   HEMATOCRIT 41.2 38.8   MCV 96.7 96.5   MCH 27.5 27.9   MCHC 28.4* 28.9*   RDW 55.3* 54.2*   PLATELETCT 219 212   MPV 11.0 11.4     Recent Labs     07/23/21  0614 07/24/21  0419 07/25/21  0654   INR 1.59* 2.18* 2.95*             Recent Labs     07/23/21  0614 07/24/21  0419   SODIUM 143 138   POTASSIUM 4.8 4.7   CHLORIDE 106 102   CO2 32 31   GLUCOSE 161* 174*   BUN 11 24*     Recent Labs     07/23/21  0614 07/24/21  0419   SODIUM 143 138   POTASSIUM 4.8 4.7   CHLORIDE 106 102   CO2 32 31   BUN 11 24*   CREATININE 0.90 1.14   CALCIUM 9.2 9.2     No results found for this or any previous visit.      Imaging:   CT-CTA CHEST PULMONARY ARTERY W/ RECONS   Final Result      1.  There is no CT evidence of acute pulmonary embolism.   2.  There are new areas of dependent airspace disease most consistent with atelectasis.   3.  There is no evidence of pulmonary edema, pleural effusion or failure.   4.  Nonspecific mediastinal lymph nodes are relatively unchanged and are likely reactive.            DX-CHEST-PORTABLE (1 VIEW)   Final Result      Cardiomegaly and bilateral interstitial prominence, likely edema. No focal consolidation or pleural effusions.          Hospital Medications:    Current Facility-Administered Medications:   •  methylPREDNISolone (SOLU-MEDROL) 40 MG injection 40 mg, 40 mg, Intravenous, Q12HRS, Romy Gallardo M.D.  •  guaiFENesin dextromethorphan (ROBITUSSIN DM) 100-10 MG/5ML syrup 5 mL, 5 mL, Oral, Q6HRS PRN, Romy Gallardo M.D., 5 mL at 07/25/21 0311  •   budesonide (PULMICORT) neb susp 0.25 mg, 0.25 mg, Nebulization, BID (RT), Pal Lopez M.D., 0.25 mg at 07/25/21 0624  •  furosemide (LASIX) tablet 20 mg, 20 mg, Oral, BID DIURETIC, Romy Gallardo M.D., 20 mg at 07/25/21 0520  •  guaiFENesin ER (MUCINEX) tablet 600 mg, 600 mg, Oral, Q12HRS, Padmini Jewell M.D., 600 mg at 07/25/21 0520  •  menthol (HALLS) lozenge 1 Lozenge, 1 Lozenge, Oral, Q2HRS PRN, Padmini Jewell M.D., 1 Lozenge at 07/25/21 0529  •  acetaminophen (Tylenol) tablet 650 mg, 650 mg, Oral, Q6HRS PRN, Mirnacarlo Mims, D.O., 650 mg at 07/25/21 1122  •  cloNIDine (CATAPRES) tablet 0.1 mg, 0.1 mg, Oral, Q6HRS PRN, Mirna CHRISTIAN Mims, D.O.  •  enalaprilat (VASOTEC) injection 1.25 mg, 1.25 mg, Intravenous, Q6HRS PRN, Mirna CHRISTIAN Mims, D.O.  •  labetalol (NORMODYNE/TRANDATE) injection 10 mg, 10 mg, Intravenous, Q4HRS PRN, Mirnacarlo Mims, D.O.  •  ondansetron (ZOFRAN) syringe/vial injection 4 mg, 4 mg, Intravenous, Q4HRS PRN, Mirnacarlo Mims, D.O.  •  ondansetron (ZOFRAN ODT) dispertab 4 mg, 4 mg, Oral, Q4HRS PRN, Mirna CHRISTIAN Mims, D.O.  •  promethazine (PHENERGAN) tablet 12.5-25 mg, 12.5-25 mg, Oral, Q4HRS PRN, Mirna CHRISTIAN Mims, D.O.  •  promethazine (PHENERGAN) suppository 12.5-25 mg, 12.5-25 mg, Rectal, Q4HRS PRN, Mirna Mims D.O.  •  prochlorperazine (COMPAZINE) injection 5-10 mg, 5-10 mg, Intravenous, Q4HRS PRN, Mirna Mims D.O.  •  Respiratory Therapy Consult, , Nebulization, Continuous RT, Mirna Mims D.O.  •  ipratropium-albuterol (DUONEB) nebulizer solution, 3 mL, Nebulization, Q4HRS (RT), Mirna Mims D.O., 3 mL at 07/25/21 1007  •  ARIPiprazole (Abilify) tablet 30 mg, 30 mg, Oral, QAM, TETE Sargent.OOmar, 30 mg at 07/25/21 0520  •  atorvastatin (LIPITOR) tablet 20 mg, 20 mg, Oral, DAILY, TETE Sargent.O., 20 mg at 07/24/21 1725  •  famotidine (PEPCID) tablet 40 mg, 40 mg, Oral, DAILY, TETE Sargent.O., 40 mg at 07/25/21 0520  •  montelukast (SINGULAIR) tablet 10 mg,  10 mg, Oral, QHS, Mirna Mims D.O., 10 mg at 07/24/21 2004  •  potassium chloride SA (Kdur) tablet 20 mEq, 20 mEq, Oral, DAILY, Mirna Mims D.O., 20 mEq at 07/24/21 1725  •  MD Alert...Warfarin per Pharmacy, , Other, PHARMACY TO DOSE, Mirna Mims D.O.    Current Outpatient Medications:  Medications Prior to Admission   Medication Sig Dispense Refill Last Dose   • fluticasone (FLONASE) 50 MCG/ACT nasal spray Administer 1-2 Sprays into affected nostril(S) 2 times a day.   7/21/2021 at PM   • fexofenadine-pseudoephedrine (ALLEGRA-D)  MG per tablet Take 1 tablet by mouth 2 times a day. 20 tablet 0 7/22/2021 at AM   • famotidine (PEPCID) 40 MG Tab Take 40 mg by mouth every day. Indications: Heartburn   7/21/2021 at AM   • warfarin (COUMADIN) 6 MG Tab Take 6-9 mg by mouth every day. 6mg Monday-Saturday  9mg on Sunday 7/21/2021 at PM   • nitroglycerin (NITROSTAT) 0.4 MG SL Tab Place 1 tablet under the tongue as needed for Chest Pain. 25 tablet 0 PRN at PRN   • albuterol 108 (90 Base) MCG/ACT Aero Soln inhalation aerosol Inhale 2 Puffs every 6 hours as needed for Shortness of Breath. 8.5 g 0 7/21/2021 at AFTERNOON   • acetaminophen (TYLENOL) 500 MG Tab Take 1,000 mg by mouth every 6 hours as needed (pain).   7/21/2021 at AFTERNOON   • furosemide (LASIX) 20 MG Tab Take 1 tablet by mouth 2 times a day. 60 tablet 3 7/21/2021 at PM   • potassium chloride SA (KDUR) 20 MEQ Tab CR Take 1 tablet by mouth every day. 30 tablet 3 7/21/2021 at AM   • montelukast (SINGULAIR) 10 MG Tab Take 1 Tab by mouth every bedtime. 30 Tab 11 7/21/2021 at PM   • atorvastatin (LIPITOR) 20 MG Tab Take 1 Tab by mouth every day. 30 Tab 1 7/21/2021 at PM   • tiotropium (SPIRIVA HANDIHALER) 18 MCG Cap Inhale 1 Cap by mouth every day. 30 Cap 3 7/21/2021 at AM   • aripiprazole (ABILIFY) 30 MG tablet Take 30 mg by mouth every morning.   7/21/2021 at AM       Medication Allergy:  Allergies   Allergen Reactions   • Amoxicillin Rash and  Itching     Tolerates cephalosporins, pt reports that she gets a rash all over her body and gets itchy   • Penicillin G Rash and Itching     pt reports that she gets a rash all over her body and gets itchy       Assessment and Plan:  Principal Problem:    Acute on chronic respiratory failure (HCC) POA: Yes  Active Problems:    Chronic obstructive pulmonary disease with acute exacerbation (HCC) POA: Yes    Bipolar Disorder POA: Yes    KATHIA and COPD overlap syndrome ([facility]) POA: Yes    Class 3 severe obesity in adult ([facility]) POA: Unknown    Tobacco use POA: Yes    Schizophrenia (HCC) POA: Yes    Paroxysmal A-fib ([facility]) POA: Yes    HLD (hyperlipidemia) POA: Yes    Obesity hypoventilation syndrome (HCC) POA: Unknown  Resolved Problems:    * No resolved hospital problems. *    46 y/o F with PMH significant for reported COPD (on 2L O2 at home, FEV1/FVC 89%, FEV1 70% per PFTs 2020) with overlap syndrome of KATHIA/OHS (failed BiPAP/CPAP, put on AVAPS), HFpEF with severe pulmonary HTN (RVSP 65 mmHg per TTE 2019), paroxysmal afib (on Warfarin, previously subtherapeutic), morbid obesity, bipolar disorder, schizophrenia, tobacco use disorder, and hypertension who presented 7/22/2021 with shortness of breath x 1 week which had worsened acutely in the last 2 days prior to admission.    #Acute on chronic hypoxic and hypercapnic respiratory failure  #Above multifactorial with obesity, obesity hypoventilation syndrome, KATHIA and obstructive airway disease and dependent atelectasis  #Gp II/III pHTN in the above setting with WHO functional class III    -Continue on present AVAPS setting. Discussed again today of importance of wearing the mask.   -Guaifenesin + cough drops  -Continue azithromycin (last day 7/24)  -Continue duonebs, montelukast, methylprednisolone, pulmicort nebz.  -Can transition to symbicort, tiotropium and albuterol inhaler when better.   -Can restart flonase on discharge  -Continue incentive spirometer (10x/hr while  awake)  -Agree with palliative care consult; needs lengthy discussion for readmission prevention  -Education on the importance of compliance with AVAPS  -Smoking cessation counseling + resources  -Patient needs to have regular follow up outpatient with pulmonologist, sleep provider and PCP  -Pulmonary rehab after discharge from hospital  -Can consider repeat PFTs in outpatient setting with BD challenge. I worry if there is an underlying element of asthma provoking her bronchospasm. This could be investigated as outpatient.      Thank you for this consult.    Pal Lopez MD

## 2021-07-25 NOTE — PROGRESS NOTES
Pt A&Ox4. 3-4L oxygen on. Assist x1 with walker to sit in chair for meals. Small cough on and off throughout the day, cough syrup given. Pt c/o rib cage pain d/t coughing, tylenol given. Continued steroids and lasix.

## 2021-07-26 PROBLEM — J44.1 CHRONIC OBSTRUCTIVE PULMONARY DISEASE WITH ACUTE EXACERBATION (HCC): Status: RESOLVED | Noted: 2018-11-13 | Resolved: 2021-07-26

## 2021-07-26 PROBLEM — J45.50 SEVERE PERSISTENT ASTHMA WITHOUT COMPLICATION: Status: ACTIVE | Noted: 2021-07-26

## 2021-07-26 LAB
ANION GAP SERPL CALC-SCNC: 6 MMOL/L (ref 7–16)
BACTERIA SPEC RESP CULT: NORMAL
BASOPHILS # BLD AUTO: 0 % (ref 0–1.8)
BASOPHILS # BLD: 0 K/UL (ref 0–0.12)
BUN SERPL-MCNC: 30 MG/DL (ref 8–22)
CALCIUM SERPL-MCNC: 9.1 MG/DL (ref 8.5–10.5)
CHLORIDE SERPL-SCNC: 101 MMOL/L (ref 96–112)
CO2 SERPL-SCNC: 34 MMOL/L (ref 20–33)
CREAT SERPL-MCNC: 0.92 MG/DL (ref 0.5–1.4)
EOSINOPHIL # BLD AUTO: 0 K/UL (ref 0–0.51)
EOSINOPHIL NFR BLD: 0 % (ref 0–6.9)
ERYTHROCYTE [DISTWIDTH] IN BLOOD BY AUTOMATED COUNT: 50.3 FL (ref 35.9–50)
GLUCOSE SERPL-MCNC: 160 MG/DL (ref 65–99)
HCT VFR BLD AUTO: 38 % (ref 37–47)
HGB BLD-MCNC: 11.3 G/DL (ref 12–16)
IMM GRANULOCYTES # BLD AUTO: 0.05 K/UL (ref 0–0.11)
IMM GRANULOCYTES NFR BLD AUTO: 0.8 % (ref 0–0.9)
INR PPP: 3.12 (ref 0.87–1.13)
LYMPHOCYTES # BLD AUTO: 0.64 K/UL (ref 1–4.8)
LYMPHOCYTES NFR BLD: 10.2 % (ref 22–41)
MCH RBC QN AUTO: 27.5 PG (ref 27–33)
MCHC RBC AUTO-ENTMCNC: 29.7 G/DL (ref 33.6–35)
MCV RBC AUTO: 92.5 FL (ref 81.4–97.8)
MONOCYTES # BLD AUTO: 0.38 K/UL (ref 0–0.85)
MONOCYTES NFR BLD AUTO: 6.1 % (ref 0–13.4)
NEUTROPHILS # BLD AUTO: 5.21 K/UL (ref 2–7.15)
NEUTROPHILS NFR BLD: 82.9 % (ref 44–72)
NRBC # BLD AUTO: 0 K/UL
NRBC BLD-RTO: 0 /100 WBC
PLATELET # BLD AUTO: 199 K/UL (ref 164–446)
PMV BLD AUTO: 11.6 FL (ref 9–12.9)
POTASSIUM SERPL-SCNC: 4.4 MMOL/L (ref 3.6–5.5)
PROTHROMBIN TIME: 31.1 SEC (ref 12–14.6)
RBC # BLD AUTO: 4.11 M/UL (ref 4.2–5.4)
SIGNIFICANT IND 70042: NORMAL
SITE SITE: NORMAL
SODIUM SERPL-SCNC: 141 MMOL/L (ref 135–145)
SOURCE SOURCE: NORMAL
WBC # BLD AUTO: 6.3 K/UL (ref 4.8–10.8)

## 2021-07-26 PROCEDURE — 97116 GAIT TRAINING THERAPY: CPT

## 2021-07-26 PROCEDURE — 700111 HCHG RX REV CODE 636 W/ 250 OVERRIDE (IP): Performed by: STUDENT IN AN ORGANIZED HEALTH CARE EDUCATION/TRAINING PROGRAM

## 2021-07-26 PROCEDURE — 700101 HCHG RX REV CODE 250: Performed by: STUDENT IN AN ORGANIZED HEALTH CARE EDUCATION/TRAINING PROGRAM

## 2021-07-26 PROCEDURE — 99233 SBSQ HOSP IP/OBS HIGH 50: CPT | Mod: GC | Performed by: INTERNAL MEDICINE

## 2021-07-26 PROCEDURE — A9270 NON-COVERED ITEM OR SERVICE: HCPCS | Performed by: STUDENT IN AN ORGANIZED HEALTH CARE EDUCATION/TRAINING PROGRAM

## 2021-07-26 PROCEDURE — 99232 SBSQ HOSP IP/OBS MODERATE 35: CPT | Performed by: STUDENT IN AN ORGANIZED HEALTH CARE EDUCATION/TRAINING PROGRAM

## 2021-07-26 PROCEDURE — 85025 COMPLETE CBC W/AUTO DIFF WBC: CPT

## 2021-07-26 PROCEDURE — 80048 BASIC METABOLIC PNL TOTAL CA: CPT

## 2021-07-26 PROCEDURE — 94669 MECHANICAL CHEST WALL OSCILL: CPT

## 2021-07-26 PROCEDURE — A9270 NON-COVERED ITEM OR SERVICE: HCPCS | Performed by: INTERNAL MEDICINE

## 2021-07-26 PROCEDURE — 700102 HCHG RX REV CODE 250 W/ 637 OVERRIDE(OP): Performed by: STUDENT IN AN ORGANIZED HEALTH CARE EDUCATION/TRAINING PROGRAM

## 2021-07-26 PROCEDURE — 700101 HCHG RX REV CODE 250: Performed by: INTERNAL MEDICINE

## 2021-07-26 PROCEDURE — 85610 PROTHROMBIN TIME: CPT

## 2021-07-26 PROCEDURE — 36415 COLL VENOUS BLD VENIPUNCTURE: CPT

## 2021-07-26 PROCEDURE — 770001 HCHG ROOM/CARE - MED/SURG/GYN PRIV*

## 2021-07-26 PROCEDURE — 700102 HCHG RX REV CODE 250 W/ 637 OVERRIDE(OP): Performed by: INTERNAL MEDICINE

## 2021-07-26 PROCEDURE — 97530 THERAPEUTIC ACTIVITIES: CPT

## 2021-07-26 PROCEDURE — 94640 AIRWAY INHALATION TREATMENT: CPT

## 2021-07-26 PROCEDURE — 94660 CPAP INITIATION&MGMT: CPT

## 2021-07-26 RX ORDER — IPRATROPIUM BROMIDE AND ALBUTEROL SULFATE 2.5; .5 MG/3ML; MG/3ML
3 SOLUTION RESPIRATORY (INHALATION)
Status: DISCONTINUED | OUTPATIENT
Start: 2021-07-26 | End: 2021-07-27 | Stop reason: HOSPADM

## 2021-07-26 RX ORDER — BUDESONIDE 0.5 MG/2ML
0.5 INHALANT ORAL
Status: DISCONTINUED | OUTPATIENT
Start: 2021-07-26 | End: 2021-07-27 | Stop reason: HOSPADM

## 2021-07-26 RX ADMIN — FUROSEMIDE 20 MG: 20 TABLET ORAL at 15:59

## 2021-07-26 RX ADMIN — GUAIFENESIN 600 MG: 600 TABLET, EXTENDED RELEASE ORAL at 07:01

## 2021-07-26 RX ADMIN — Medication 1 LOZENGE: at 17:27

## 2021-07-26 RX ADMIN — GUAIFENESIN AND DEXTROMETHORPHAN 5 ML: 100; 10 SYRUP ORAL at 07:39

## 2021-07-26 RX ADMIN — ARIPIPRAZOLE 30 MG: 10 TABLET ORAL at 07:01

## 2021-07-26 RX ADMIN — ACETAMINOPHEN 650 MG: 325 TABLET, FILM COATED ORAL at 15:59

## 2021-07-26 RX ADMIN — FUROSEMIDE 20 MG: 20 TABLET ORAL at 07:01

## 2021-07-26 RX ADMIN — POTASSIUM CHLORIDE 20 MEQ: 1500 TABLET, EXTENDED RELEASE ORAL at 17:27

## 2021-07-26 RX ADMIN — Medication 1 LOZENGE: at 21:14

## 2021-07-26 RX ADMIN — IPRATROPIUM BROMIDE AND ALBUTEROL SULFATE 3 ML: .5; 2.5 SOLUTION RESPIRATORY (INHALATION) at 14:52

## 2021-07-26 RX ADMIN — METHYLPREDNISOLONE SODIUM SUCCINATE 40 MG: 40 INJECTION, POWDER, FOR SOLUTION INTRAMUSCULAR; INTRAVENOUS at 07:02

## 2021-07-26 RX ADMIN — ACETAMINOPHEN 650 MG: 325 TABLET, FILM COATED ORAL at 07:38

## 2021-07-26 RX ADMIN — IPRATROPIUM BROMIDE AND ALBUTEROL SULFATE 3 ML: .5; 2.5 SOLUTION RESPIRATORY (INHALATION) at 06:35

## 2021-07-26 RX ADMIN — MONTELUKAST 10 MG: 10 TABLET, FILM COATED ORAL at 21:12

## 2021-07-26 RX ADMIN — FAMOTIDINE 40 MG: 20 TABLET ORAL at 07:02

## 2021-07-26 RX ADMIN — METHYLPREDNISOLONE SODIUM SUCCINATE 40 MG: 40 INJECTION, POWDER, FOR SOLUTION INTRAMUSCULAR; INTRAVENOUS at 17:27

## 2021-07-26 RX ADMIN — BUDESONIDE 0.25 MG: 0.25 SUSPENSION RESPIRATORY (INHALATION) at 06:35

## 2021-07-26 RX ADMIN — GUAIFENESIN AND DEXTROMETHORPHAN 5 ML: 100; 10 SYRUP ORAL at 17:27

## 2021-07-26 RX ADMIN — ATORVASTATIN CALCIUM 20 MG: 20 TABLET, FILM COATED ORAL at 17:27

## 2021-07-26 RX ADMIN — Medication 1 LOZENGE: at 07:39

## 2021-07-26 RX ADMIN — ACETAMINOPHEN 650 MG: 325 TABLET, FILM COATED ORAL at 23:06

## 2021-07-26 RX ADMIN — BUDESONIDE 0.5 MG: 0.5 SUSPENSION RESPIRATORY (INHALATION) at 20:25

## 2021-07-26 RX ADMIN — GUAIFENESIN 600 MG: 600 TABLET, EXTENDED RELEASE ORAL at 17:27

## 2021-07-26 RX ADMIN — IPRATROPIUM BROMIDE AND ALBUTEROL SULFATE 3 ML: .5; 2.5 SOLUTION RESPIRATORY (INHALATION) at 10:11

## 2021-07-26 ASSESSMENT — PAIN DESCRIPTION - PAIN TYPE
TYPE: ACUTE PAIN

## 2021-07-26 ASSESSMENT — COGNITIVE AND FUNCTIONAL STATUS - GENERAL
TURNING FROM BACK TO SIDE WHILE IN FLAT BAD: UNABLE
SUGGESTED CMS G CODE MODIFIER MOBILITY: CJ
CLIMB 3 TO 5 STEPS WITH RAILING: A LITTLE
MOBILITY SCORE: 20

## 2021-07-26 ASSESSMENT — ENCOUNTER SYMPTOMS
VOMITING: 0
WEAKNESS: 1
BRUISES/BLEEDS EASILY: 0
COUGH: 1
WHEEZING: 1
SPUTUM PRODUCTION: 1
DIAPHORESIS: 0
FEVER: 0
SHORTNESS OF BREATH: 1
PALPITATIONS: 0
NAUSEA: 0
CHILLS: 0

## 2021-07-26 ASSESSMENT — GAIT ASSESSMENTS
ASSISTIVE DEVICE: 4 WHEEL WALKER
GAIT LEVEL OF ASSIST: SUPERVISED
DISTANCE (FEET): 100

## 2021-07-26 NOTE — PROGRESS NOTES
Inpatient Anticoagulation Service Note    Date: 7/26/2021    Reason for Anticoagulation: Atrial Fibrillation   Target INR: 2.0 to 3.0    [id number] VASc Score: 1  HAS-BLED Score: 1     Hemoglobin Value: (!) 11.3  Hematocrit Value: 38  Lab Platelet Value: 199    INR from last 7 days     Date/Time INR Value    07/26/21 04:26:01  (!) 3.12    07/25/21 0654  (!) 2.95    07/24/21 04:19:01  (!) 2.18    07/23/21 06:14:01  (!) 1.59    07/22/21 12:22:01  (!) 1.65        Dose from last 7 days     Date/Time Dose (mg)    07/26/21 1606  0    07/25/21 1032  0    07/24/21 1046  6    07/23/21 1252  9    07/22/21 1812  9        Average Dose (mg):  (9 mg on Sundays and 6 mg AOD per [facility])  Significant Interactions: Corticosteroids, Statin  Bridge Therapy: No     Comments: INR continues to trend upward into SUPRAtherapeutic range.     Plan:  Hold warfarin.     Education Material Provided?: No (Home medication)    Pharmacist suggested discharge dosing: TBD, Follow up with [facility].      Faiza Bedolla, PharmD, BCPS

## 2021-07-26 NOTE — CARE PLAN
Problem: Self Care  Goal: Patient will have the ability to perform ADLs independently or with assistance (bathe, groom, dress, toilet and feed)  Outcome: Not Progressing  Note: Pt calls appropriately and is able to communicate needs.  Still requires assistance of staff form most self care needs.      Problem: Risk for Infection - COPD  Goal: Patient will remain free from signs and symptoms of infection  Outcome: Progressing     Problem: Nutrition - Advanced  Goal: Patient will display progressive weight gain toward goal have adequate food and fluid intake  Outcome: Progressing     Problem: Ineffective Airway Clearance  Goal: Patient will maintain patent airway with clear/clearing breath sounds  Outcome: Progressing     Problem: Impaired Gas Exchange  Goal: Patient will demonstrate improved ventilation and adequate oxygenation and participate in treatment regimen within the level of ability/situation.  Outcome: Progressing   The patient is Stable - Low risk of patient condition declining or worsening    Shift Goals  Clinical Goals: Oxygenation and ventilation  Patient Goals: Relieve cough symptoms    Progress made toward(s) clinical / shift goals:  Pt is participating in respiratory interventions.  SpO2 maintained in the 90s.     Patient is not progressing towards the following goals:      Problem: Self Care  Goal: Patient will have the ability to perform ADLs independently or with assistance (bathe, groom, dress, toilet and feed)  Outcome: Not Progressing  Note: Pt calls appropriately and is able to communicate needs.  Still requires assistance of staff form most self care needs.

## 2021-07-26 NOTE — FACE TO FACE
Face to Face Supporting Documentation - Home Health    The encounter with this patient was in whole or in part the primary reason for home health admission.    Date of encounter:   Patient:                    MRN:                       YOB: 2023  Lorene Haro  [id number]  1975     Home health to see patient for:  Skilled Nursing care for assessment, interventions & education    Skilled need for:  Exacerbation of Chronic Disease State COPD exacerbation    Skilled nursing interventions to include:  Comment: debility    Homebound status evidenced by:  Need the aid of supportive devices such as crutches, canes, wheelchairs or walkers. Leaving home requires a considerable and taxing effort. There is a normal inability to leave the home.    Community Physician to provide follow up care: SHANKAR Gleason     Optional Interventions? No      I certify the face to face encounter for this home health care referral meets the CMS requirements and the encounter/clinical assessment with the patient was, in whole, or in part, for the medical condition(s) listed above, which is the primary reason for home health care. Based on my clinical findings: the service(s) are medically necessary, support the need for home health care, and the homebound criteria are met.  I certify that this patient has had a face to face encounter by myself.  Romy Gallardo M.D. - NPI: [id number]

## 2021-07-26 NOTE — PROGRESS NOTES
Hospital Medicine Daily Progress Note    Date of Service  7/26/2021    Chief Complaint  Lorene Haro is a 45 y.o. female admitted 7/22/2021 with sob    Hospital Course  45 y.o. female with past medical history of COPD on 2 L home oxygen, hypertension, schizophrenia, and paroxysmal A. Fib on coumadin, morbid obesity, who presented 7/22/2021 with shortness of breath.  Patient reports she has had worsening shortness of breath and cough for the last week but progressively worsened over the last 2 days.  Patient reports wheezing and phlegm production. Patient denies chest pain, fevers, chills, nausea, or diarrhea.  Patient does admit to increasing weakness with difficulty walking around at home with her walker.  Patient reports she has been using all of her home inhalers regularly.  Of note patient was admitted 1 month ago for episode of chest pain with negative work-up.  She has had multiple ER visits for her COPD.    CXR: bilateral interstitial prominence, no focal consolidation  ProBNP: 397  Procal: neg  She is on duoneb, flonase, solumedrol  CTA: no evidence of PE, consolidation or pleural effusion  Given multiple admissions, consult pulmonary and palliative. Discussed with pulmonary, patient failed both CPAP and BiPAP titration, and was placed on AVAPS with significant improvement in fatigue and sleepiness per Dr. Meraz's office note 8/28/20. AVAPS setting: EPAP 11cm of water, IPAP 24/15 cm of water, vol 450ml, rate of 14, and 3L oxygen.   PT/OT: post acute    Interval Problem Update  Patient is very weak, has low energy. Patient's coughing is improved, but still very wheezing and feel lethargic  She tolerated AVAPS last night.   Denies fever, chills, nausea, vomiting     I have personally seen and examined the patient at bedside. I discussed the plan of care with patient, bedside RN, charge RN, , pharmacy and pulmonary.    Consultants/Specialty  palliative care and pulmonary    Code Status  Full  Code    Disposition  Patient is not medically cleared.   Anticipate discharge to to home with organized home healthcare and close outpatient follow-up.  I have placed the appropriate orders for post-discharge needs.    Review of Systems  Review of Systems   Constitutional: Positive for malaise/fatigue.   Respiratory: Positive for cough, sputum production, shortness of breath and wheezing.    Cardiovascular: Positive for leg swelling.   Neurological: Positive for weakness.   All other systems reviewed and are negative.       Physical Exam  Temp:  [36.1 °C (96.9 °F)-36.7 °C (98 °F)] 36.4 °C (97.6 °F)  Pulse:  [60-86] 62  Resp:  [16-18] 16  BP: (118-138)/(65-75) 118/68  SpO2:  [92 %-99 %] 95 %    Physical Exam  Vitals and nursing note reviewed.   Constitutional:       Appearance: Normal appearance. She is obese. She is ill-appearing.   HENT:      Head: Normocephalic and atraumatic.      Nose: Nose normal.      Mouth/Throat:      Pharynx: Oropharynx is clear.   Eyes:      Extraocular Movements: Extraocular movements intact.      Conjunctiva/sclera: Conjunctivae normal.      Pupils: Pupils are equal, round, and reactive to light.   Cardiovascular:      Rate and Rhythm: Normal rate and regular rhythm.      Pulses: Normal pulses.      Heart sounds: Normal heart sounds.   Pulmonary:      Effort: Pulmonary effort is normal.      Breath sounds: Wheezing and rales present.      Comments: O2 supplements  Abdominal:      General: Abdomen is flat. Bowel sounds are normal. There is no distension.      Palpations: Abdomen is soft.      Tenderness: There is no abdominal tenderness.   Musculoskeletal:         General: Normal range of motion.      Cervical back: Normal range of motion and neck supple.      Right lower leg: Edema present.      Left lower leg: Edema present.   Skin:     General: Skin is warm.   Neurological:      General: No focal deficit present.      Mental Status: She is alert and oriented to person, place, and time.  Mental status is at baseline.      Motor: Weakness (4/5 stength b/l LE ) present.   Psychiatric:         Mood and Affect: Mood normal.         Behavior: Behavior normal.         Fluids    Intake/Output Summary (Last 24 hours) at 7/26/2021 1043  Last data filed at 7/26/2021 1023  Gross per 24 hour   Intake 1320 ml   Output 1325 ml   Net -5 ml       Laboratory  Recent Labs     07/24/21  0419 07/26/21  0426   WBC 6.9 6.3   RBC 4.02* 4.11*   HEMOGLOBIN 11.2* 11.3*   HEMATOCRIT 38.8 38.0   MCV 96.5 92.5   MCH 27.9 27.5   MCHC 28.9* 29.7*   RDW 54.2* 50.3*   PLATELETCT 212 199   MPV 11.4 11.6     Recent Labs     07/24/21  0419 07/26/21  0426   SODIUM 138 141   POTASSIUM 4.7 4.4   CHLORIDE 102 101   CO2 31 34*   GLUCOSE 174* 160*   BUN 24* 30*   CREATININE 1.14 0.92   CALCIUM 9.2 9.1     Recent Labs     07/24/21  0419 07/25/21  0654 07/26/21  0426   INR 2.18* 2.95* 3.12*               Imaging  CT-CTA CHEST PULMONARY ARTERY W/ RECONS   Final Result      1.  There is no CT evidence of acute pulmonary embolism.   2.  There are new areas of dependent airspace disease most consistent with atelectasis.   3.  There is no evidence of pulmonary edema, pleural effusion or failure.   4.  Nonspecific mediastinal lymph nodes are relatively unchanged and are likely reactive.            DX-CHEST-PORTABLE (1 VIEW)   Final Result      Cardiomegaly and bilateral interstitial prominence, likely edema. No focal consolidation or pleural effusions.           Assessment/Plan  * Acute on chronic respiratory failure (HCC)- (present on admission)  Assessment & Plan  Worsening sob with wheezing, multiple admissions. Likely sec to COPD exacerbations, KATHIA and OHS also play role. Use 2L O2 at home  ProBNP elevated 397  Procal: pending  CTA: no evidence of PE, consolidation or pleural effusion  Sputum culture ordered    Breathing treatments with duoneb, montelukast, pulmicort  Solu medrol  Abx: azithromycin, completed  Start lasix  RT consult  COPD  coordinator  IS  Wean O2 as tolerated   Given multiple admissions, consult palliative for further GOC discussions and pulmonary for further evaluations  I discussed with pulmonary Dr. Lopez, patient failed both CPAP and BiPAP titration, and was placed on AVAPS with significant improvement in fatigue and sleepiness per Dr. Meraz's office note 8/28/20. AVAPS setting: EPAP 11cm of water, IPAP 24/15 cm of wter, vol 450ml, rate of 14, and 3L oxygen. Discussed with RT, tolerating with AVAPS  Outpatient pulmonary follow up and pulmonary rehab advised      KATHIA and COPD overlap syndrome (HCC)- (present on admission)  Assessment & Plan  Per pulmonary, patient failed both CPAP and BiPAP titration, and was placed on AVAPS with significant improvement in fatigue and sleepiness per Dr. Meraz's office note 8/28/20. AVAPS setting: EPAP 11cm of water, IPAP 24/15 cm of water, vol 450ml, rate of 14, and 3L oxygen.     Chronic obstructive pulmonary disease with acute exacerbation (HCC)- (present on admission)  Assessment & Plan  Hx of COPD, on 2L home oxygen  See above plans    Class 3 severe obesity in adult (HCC)  Assessment & Plan  Life style modifications including healthy plant based diet and regular exercise recommended   Outpatient weight loss surgery recommended     Obesity hypoventilation syndrome (HCC)  Assessment & Plan  Also contributing to acute on chronic respiratory failure    HLD (hyperlipidemia)- (present on admission)  Assessment & Plan  Continue statin    Bipolar Disorder- (present on admission)  Assessment & Plan  Continue home meds    Paroxysmal A-fib (HCC)- (present on admission)  Assessment & Plan  INR subratherapeutic   INR goal 2-3  Will continue with Coumadin. I coordinate with pharmacy for coumadin dosing. INR target 2-3. Closely monitor any sign of bleeding.      Schizophrenia (HCC)- (present on admission)  Assessment & Plan  Cont Abilify     Tobacco use- (present on admission)  Assessment & Plan  5 minutes  on tobacco cessation counseling provided including nicotine patches, gum, and dangers of smoking. Discussed the risks of smoking including increased risk of heart disease, stroke, cancer and COPD. Discussed the benefits of quitting smoking.          VTE prophylaxis: therapeutic anticoagulation with coumadin
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
## 2021-07-26 NOTE — CARE PLAN
Problem: Bronchoconstriction  Goal: Improve in air movement and diminished wheezing  Description: 1.  Implement inhaled treatments  2.  Evaluate and manage medication effects  Flowsheets (Taken 7/26/2021 0294)  Bronchodilator Goals/Outcome: Patient at Stable Baseline  Bronchodilator Indications: History / Diagnosis  Note:     Respiratory Update    Treatment modality: SVN/Flutter  Frequency:QID    Pt tolerating current treatments well with no adverse reactions.

## 2021-07-26 NOTE — THERAPY
Physical Therapy   Daily Treatment     Patient Name: Lorene Haro  Age:  45 y.o., Sex:  female  Medical Record #: [id number]  Today's Date: 7/26/2021     Precautions: Fall Risk (hx of COPD with baseline 2LPM at home)    Assessment    Rec'd pt alert, in bed, and willing to participate w/ PT.  She is able to sit eob w/o assist.  Pt is able to ambulate 100 ft w/ her own 4ww w/o assist.  Able to get on/off the toilet and clean herself, also w/o assist.  She denies the need to perform stairs in order to d/c back home.  Per chart review, she receives 8 hours per day of help, including grocery shopping, as well as home health 2x/wk to assist w/ showering.  Pt appears to be at her PLOF.  Recommend oob/amb prn w/ nsg and 4ww.  PT will be available for d/c needs.    DC Equipment Recommendations: None  Discharge Recommendations: Recommend home health for continued physical therapy services      Objective       07/26/21 0707   Balance   Sitting Balance (Static) Fair +   Sitting Balance (Dynamic) Fair +   Standing Balance (Static) Fair +   Standing Balance (Dynamic) Fair +   Weight Shift Sitting Good   Weight Shift Standing Good   Comments w/ 4ww   Gait Analysis   Gait Level Of Assist Supervised   Assistive Device 4 Wheel Walker   Distance (Feet) 100   Bed Mobility    Supine to Sit Supervised   Functional Mobility   Sit to Stand Supervised   Bed, Chair, Wheelchair Transfer Supervised   Toilet Transfers Minimal Assist   Short Term Goals    Short Term Goal # 1 Pt will perform supine<>sit with HOB flat and SPV within 6 visits to improve bed mob for DC.   Goal Outcome # 1 Goal met   Short Term Goal # 2 Pt will perform all fxnl transfers with 4WW and SPV within 6 visits to improve safe transfers for DC.   Goal Outcome # 2 Goal met   Short Term Goal # 3 Pt will amb >50ft with 4WW and SPV within 6 visits to amb limited household distances for DC.   Goal Outcome # 3 Goal met   Anticipated Discharge Equipment and Recommendations   DC  Equipment Recommendations None   Discharge Recommendations Recommend home health for continued physical therapy services

## 2021-07-26 NOTE — PROGRESS NOTES
Assume care of pt at 1900. Report received from Day shift RN. Pt is A/O 4. Pain is denied.  Discussed plan of care for the shift.  Pt denies any needs at this time.  Pt is resting in bed. Bed in lowest and locked position, call light in reach, hourly rounding in place. Labs reviewed. Communication board updated. Will continue to monitor.     Pt is on  with 3 L O2 NC.  AVAPS at bedside.

## 2021-07-26 NOTE — PROGRESS NOTES
Pt A&Ox4. 3L oxygen on, continuous pulse ox. Assist x1 with walker to ambulate. Up to chair for all meals. Pt c/o rib pain d/t coughing, tylenol given and effective. Cough syrup and cough drop given for cough. Pt personal  stopped by, states pt receives 500 hours/month of help, someone is in her apartment with her from 6am-10pm every day.

## 2021-07-27 ENCOUNTER — PHARMACY VISIT (OUTPATIENT)
Dept: PHARMACY | Facility: MEDICAL CENTER | Age: 46
End: 2021-07-27
Payer: COMMERCIAL

## 2021-07-27 ENCOUNTER — ANTICOAGULATION MONITORING (OUTPATIENT)
Dept: CARDIOLOGY | Facility: MEDICAL CENTER | Age: 46
End: 2021-07-27

## 2021-07-27 ENCOUNTER — PATIENT OUTREACH (OUTPATIENT)
Dept: HEALTH INFORMATION MANAGEMENT | Facility: OTHER | Age: 46
End: 2021-07-27

## 2021-07-27 VITALS
SYSTOLIC BLOOD PRESSURE: 135 MMHG | WEIGHT: 281.09 LBS | TEMPERATURE: 97.5 F | OXYGEN SATURATION: 95 % | BODY MASS INDEX: 53.07 KG/M2 | HEART RATE: 51 BPM | HEIGHT: 61 IN | RESPIRATION RATE: 18 BRPM | DIASTOLIC BLOOD PRESSURE: 74 MMHG

## 2021-07-27 DIAGNOSIS — I48.0 PAROXYSMAL A-FIB (HCC): ICD-10-CM

## 2021-07-27 PROBLEM — J96.20 ACUTE ON CHRONIC RESPIRATORY FAILURE (HCC): Status: RESOLVED | Noted: 2019-11-18 | Resolved: 2021-07-27

## 2021-07-27 LAB
INR PPP: 2.46 (ref 0.87–1.13)
PROTHROMBIN TIME: 25.9 SEC (ref 12–14.6)

## 2021-07-27 PROCEDURE — A9270 NON-COVERED ITEM OR SERVICE: HCPCS | Performed by: STUDENT IN AN ORGANIZED HEALTH CARE EDUCATION/TRAINING PROGRAM

## 2021-07-27 PROCEDURE — 700111 HCHG RX REV CODE 636 W/ 250 OVERRIDE (IP): Performed by: STUDENT IN AN ORGANIZED HEALTH CARE EDUCATION/TRAINING PROGRAM

## 2021-07-27 PROCEDURE — RXMED WILLOW AMBULATORY MEDICATION CHARGE: Performed by: INTERNAL MEDICINE

## 2021-07-27 PROCEDURE — 36415 COLL VENOUS BLD VENIPUNCTURE: CPT

## 2021-07-27 PROCEDURE — 700102 HCHG RX REV CODE 250 W/ 637 OVERRIDE(OP): Performed by: INTERNAL MEDICINE

## 2021-07-27 PROCEDURE — 99239 HOSP IP/OBS DSCHRG MGMT >30: CPT | Performed by: INTERNAL MEDICINE

## 2021-07-27 PROCEDURE — A9270 NON-COVERED ITEM OR SERVICE: HCPCS | Performed by: INTERNAL MEDICINE

## 2021-07-27 PROCEDURE — 85610 PROTHROMBIN TIME: CPT

## 2021-07-27 PROCEDURE — 700102 HCHG RX REV CODE 250 W/ 637 OVERRIDE(OP): Performed by: STUDENT IN AN ORGANIZED HEALTH CARE EDUCATION/TRAINING PROGRAM

## 2021-07-27 RX ORDER — PREDNISONE 20 MG/1
40 TABLET ORAL DAILY
Status: DISCONTINUED | OUTPATIENT
Start: 2021-07-28 | End: 2021-07-27 | Stop reason: HOSPADM

## 2021-07-27 RX ORDER — WARFARIN SODIUM 6 MG/1
6 TABLET ORAL DAILY
Status: DISCONTINUED | OUTPATIENT
Start: 2021-07-27 | End: 2021-07-27 | Stop reason: HOSPADM

## 2021-07-27 RX ORDER — PREDNISONE 20 MG/1
40 TABLET ORAL DAILY
Qty: 10 TABLET | Refills: 0 | Status: SHIPPED | OUTPATIENT
Start: 2021-07-27 | End: 2021-08-01

## 2021-07-27 RX ORDER — NICOTINE 21 MG/24HR
21 PATCH, TRANSDERMAL 24 HOURS TRANSDERMAL
Status: DISCONTINUED | OUTPATIENT
Start: 2021-07-27 | End: 2021-07-27 | Stop reason: HOSPADM

## 2021-07-27 RX ADMIN — FUROSEMIDE 20 MG: 20 TABLET ORAL at 06:19

## 2021-07-27 RX ADMIN — GUAIFENESIN 600 MG: 600 TABLET, EXTENDED RELEASE ORAL at 06:19

## 2021-07-27 RX ADMIN — ARIPIPRAZOLE 30 MG: 10 TABLET ORAL at 06:18

## 2021-07-27 RX ADMIN — Medication 1 LOZENGE: at 14:27

## 2021-07-27 RX ADMIN — NICOTINE TRANSDERMAL SYSTEM 21 MG: 21 PATCH, EXTENDED RELEASE TRANSDERMAL at 11:05

## 2021-07-27 RX ADMIN — FAMOTIDINE 40 MG: 20 TABLET ORAL at 06:19

## 2021-07-27 RX ADMIN — METHYLPREDNISOLONE SODIUM SUCCINATE 40 MG: 40 INJECTION, POWDER, FOR SOLUTION INTRAMUSCULAR; INTRAVENOUS at 06:19

## 2021-07-27 ASSESSMENT — PAIN DESCRIPTION - PAIN TYPE: TYPE: ACUTE PAIN

## 2021-07-27 NOTE — PROGRESS NOTES
Spiritual Care Note    Patient Information     Patient's Name: Lorene Haro   MRN: [id number]    YOB: 1975   Age and Gender: 45 y.o. female   Service Area: Medical   Room (and Bed): [place] [address]   Ethnicity or Nationality: White    Primary Language: English   Anabaptist/Spiritual preference: Spiritual   Place of Residence: [place]   Family/Friends/Others Present:    Clinical Team Present:    Medical Diagnosis(-es)/Procedure(s):    Code Status: Full Code    Date of Admission: 7/22/2021   Length of Stay: 4 days        Spiritual Care Provider Information:  Name of Spiritual Care Provider: Mikal Hart  Title of Spiritual Care Provider: Manager  Phone Number: [phone]  E-mail: [email or web address]  Total time : 10 minutes    Spiritual Screen Results:    Gen Nursing  Spiritual Screen  Was spiritual care education provided to the patient?: Yes     Palliative Care  PC Anabaptist/Spiritual Screening  Is your spiritual health or inner well-being important to you as you cope with your medical condition?: No  Would you like to receive a visit from our Spiritual Care team or your own Jain or spiritual leader?: No  Was spiritual care education provided to the patient?: Yes  PC Sprituality screening comment: Non-Jain      Encounter/Request Information  Encounter/Request Type   Visited With: Patient  Nature of the Visit: Initial  General Visit: Yes  Referral From/ Origin of Request: Epic nursing    Religous Needs/Values  Anabaptist Needs Visit  Anabaptist Needs: Prayer    Spiritual Assessment   Spiritual Care Encounters  Observations/Symptoms: Accepting, Thankfulness  Interacton/Conversation:  (Pleasant conversation, encouragement, prayer.)  Assessment: Need  Intended Effects: Convey a Calming Presence, Demonstrate Caring and Concern, Establish Rapport and Connectedness, Lessen Anxiety, Preserve Dignity and Respect, Promote a Sense of Peace  Interventions:  (Conversation and prayer.)  Outcomes: Emotional  Release, Love/Belonging/ Compassion/Kindness/Acceptance, Spiritual Comfort  Plan: No Further Visits    Notes:  Met with patient and had pleasant conversation around her getting better, her spiritual and Buddhism background and beliefs. She is encouraged to get better and leave the hospital soon. She is grateful for the care and support, and she was certain a visit from Spiritual Care would happen at the right time, when possible. Patient was tearful, tears of norma and comfort. We prayed prayers of hope and love.

## 2021-07-27 NOTE — FACE TO FACE
Face to Face Supporting Documentation - Home Health    The encounter with this patient was in whole or in part the primary reason for home health admission.    Date of encounter:   Patient:                    MRN:                       YOB: 2023  Lorene Haro  [id number]  1975     Home health to see patient for:  Skilled Nursing care for assessment, interventions & education   Physical Therapy  Occupational Therapy    Skilled need for:  Exacerbation of Chronic Disease State COPD    Skilled nursing interventions to include:  Comment: medication assistance        Community Physician to provide follow up care: SHANKAR Gleason     Optional Interventions? No      I certify the face to face encounter for this home health care referral meets the CMS requirements and the encounter/clinical assessment with the patient was, in whole, or in part, for the medical condition(s) listed above, which is the primary reason for home health care. Based on my clinical findings: the service(s) are medically necessary, support the need for home health care, and the homebound criteria are met.  I certify that this patient has had a face to face encounter by myself.  Sami Ficnh M.D. - RADHAI: [phone]

## 2021-07-27 NOTE — FACE TO FACE
Face to Face Supporting Documentation - Home Health    The encounter with this patient was in whole or in part the primary reason for home health admission.    Date of encounter:   Patient:                    MRN:                       YOB: 2023  Lorene Haro  [id number]  1975     Home health to see patient for:  Skilled Nursing care for assessment, interventions & education    Skilled need for:  Exacerbation of Chronic Disease State COPD          Community Physician to provide follow up care: SHANKAR Gleason     Optional Interventions? No      I certify the face to face encounter for this home health care referral meets the CMS requirements and the encounter/clinical assessment with the patient was, in whole, or in part, for the medical condition(s) listed above, which is the primary reason for home health care. Based on my clinical findings: the service(s) are medically necessary, support the need for home health care, and the homebound criteria are met.  I certify that this patient has had a face to face encounter by myself.  Sami Finch M.D. - NPI: [id number]

## 2021-07-27 NOTE — PROGRESS NOTES
Inpatient Anticoagulation Service Note    Date: 7/27/2021    Reason for Anticoagulation: Atrial Fibrillation   Target INR: 2.0 to 3.0    SCA2WV2 VASc Score: 1  HAS-BLED Score: 1     Hemoglobin Value: (!) 11.3  Hematocrit Value: 38  Lab Platelet Value: 199    INR from last 7 days     Date/Time INR Value    07/27/21 0347  (!) 2.46    07/26/21 04:26:01  (!) 3.12    07/25/21 0654  (!) 2.95    07/24/21 04:19:01  (!) 2.18    07/23/21 06:14:01  (!) 1.59    07/22/21 12:22:01  (!) 1.65        Dose from last 7 days     Date/Time Dose (mg)    07/27/21 1800  6    07/26/21 ------  0    07/25/21 ------  0    07/24/21 1800  6    07/23/21 1800  9    07/22/21 1800  9        Average Dose (mg):  (9 mg on Sundays and 6 mg AOD per [facility])  Significant Interactions: Corticosteroids, Statin  Bridge Therapy: No     Plan:  INR returns to therapeutic range, Start warfarin 6 mg po daily.     Education Material Provided?: No    Pharmacist suggested discharge dosing: Warfarin 6 mg po daily      Hilda WilkersonD, BCPS

## 2021-07-27 NOTE — RESPIRATORY CARE
" COPD EDUCATION by COPD CLINICAL EDUCATOR  7/27/2021 at 11:40 AM by Joana Ireland, RRT     Patient reviewed by COPD education team. Patient has a PFT 5/21/2020 stating \"The FVC is 2.07 L or 63%, FEV1 is 1.84 L or 70%, FEV1/FVC: 89%.    Total lung capacity: 80%.  DLCO: 113%.  No significant  bronchodilator response. Interpretation:  Pseudo-restriction secondary to BMI: 51.  Normal gas transfer.\" inpatient pulmonary following patient and established with [facility].   "

## 2021-07-27 NOTE — PROGRESS NOTES
ChuckyBonnie Vance, mother of patient, was called at 1210 notifying her of pending discharge at 1500.

## 2021-07-27 NOTE — CARE PLAN
Problem: Risk for Infection - COPD  Goal: Patient will remain free from signs and symptoms of infection  Outcome: Progressing     Problem: Nutrition - Advanced  Goal: Patient will display progressive weight gain toward goal have adequate food and fluid intake  Outcome: Progressing     Problem: Ineffective Airway Clearance  Goal: Patient will maintain patent airway with clear/clearing breath sounds  Outcome: Progressing     Problem: Impaired Gas Exchange  Goal: Patient will demonstrate improved ventilation and adequate oxygenation and participate in treatment regimen within the level of ability/situation.  Outcome: Progressing     Problem: Risk for Aspiration  Goal: Patient's risk for aspiration will be absent or decrease  Outcome: Progressing     Problem: Self Care  Goal: Patient will have the ability to perform ADLs independently or with assistance (bathe, groom, dress, toilet and feed)  Outcome: Progressing     Problem: Pain - Standard  Goal: Alleviation of pain or a reduction in pain to the patient’s comfort goal  Outcome: Progressing     Problem: Skin Integrity  Goal: Skin integrity is maintained or improved  Outcome: Progressing     Problem: Fall Risk  Goal: Patient will remain free from falls  Outcome: Progressing   The patient is Stable - Low risk of patient condition declining or worsening    Shift Goals  Clinical Goals: Oxygentation and ventilation  Patient Goals: sleep comfortably    Progress made toward(s) clinical / shift goals:  Patient is maintaining Spo2 greater than 90 on 3L O2 which is the patient's baseline.  Cough improving.     Patient is not progressing towards the following goals:

## 2021-07-27 NOTE — DISCHARGE PLANNING
Meds-to-Beds: Discharge prescription order listed below delivered to patient's bedside. RN notified. Patient counseled.       Lorene Haro   Home Medication Instructions TRACIE:[id number]    Printed on:07/27/21 6720   Medication Information                      predniSONE (DELTASONE) 20 MG Tab  Take 2 Tablets by mouth every day for 5 days.               Juanita Spann, PharmD

## 2021-07-27 NOTE — PROGRESS NOTES
Assume care of pt at 1900. Report received from Day shift RN. Pt is A/O 4. Pain is denied.  Discussed plan of care for the shift.  Pt denies any needs at this time.  Pt is resting in chair at bedsid. Bed in lowest and locked position, call light in reach, hourly rounding in place. Labs reviewed. Communication board updated. Will continue to monitor.     O2 at 3L NC with   Fall precautions in place

## 2021-07-27 NOTE — DISCHARGE PLANNING
Received Choice form at 0850  Agency/Facility Name: [facility]   Referral sent per Choice form @ 6204

## 2021-07-27 NOTE — DISCHARGE INSTRUCTIONS
Chronic Obstructive Pulmonary Disease  Chronic obstructive pulmonary disease (COPD) is a long-term (chronic) lung problem. When you have COPD, it is hard for air to get in and out of your lungs. Usually the condition gets worse over time, and your lungs will never return to normal. There are things you can do to keep yourself as healthy as possible.  · Your doctor may treat your condition with:  ? Medicines.  ? Oxygen.  ? Lung surgery.  · Your doctor may also recommend:  ? Rehabilitation. This includes steps to make your body work better. It may involve a team of specialists.  ? Quitting smoking, if you smoke.  ? Exercise and changes to your diet.  ? Comfort measures (palliative care).  Follow these instructions at home:  Medicines  · Take over-the-counter and prescription medicines only as told by your doctor.  · Talk to your doctor before taking any cough or allergy medicines. You may need to avoid medicines that cause your lungs to be dry.  Lifestyle  · If you smoke, stop. Smoking makes the problem worse. If you need help quitting, ask your doctor.  · Avoid being around things that make your breathing worse. This may include smoke, chemicals, and fumes.  · Stay active, but remember to rest as well.  · Learn and use tips on how to relax.  · Make sure you get enough sleep. Most adults need at least 7 hours of sleep every night.  · Eat healthy foods. Eat smaller meals more often. Rest before meals.  Controlled breathing  Learn and use tips on how to control your breathing as told by your doctor. Try:  · Breathing in (inhaling) through your nose for 1 second. Then, pucker your lips and breath out (exhale) through your lips for 2 seconds.  · Putting one hand on your belly (abdomen). Breathe in slowly through your nose for 1 second. Your hand on your belly should move out. Pucker your lips and breathe out slowly through your lips. Your hand on your belly should move in as you breathe out.    Controlled coughing  Learn  and use controlled coughing to clear mucus from your lungs. Follow these steps:  1. Lean your head a little forward.  2. Breathe in deeply.  3. Try to hold your breath for 3 seconds.  4. Keep your mouth slightly open while coughing 2 times.  5. Spit any mucus out into a tissue.  6. Rest and do the steps again 1 or 2 times as needed.  General instructions  · Make sure you get all the shots (vaccines) that your doctor recommends. Ask your doctor about a flu shot and a pneumonia shot.  · Use oxygen therapy and pulmonary rehabilitation if told by your doctor. If you need home oxygen therapy, ask your doctor if you should buy a tool to measure your oxygen level (oximeter).  · Make a COPD action plan with your doctor. This helps you to know what to do if you feel worse than usual.  · Manage any other conditions you have as told by your doctor.  · Avoid going outside when it is very hot, cold, or humid.  · Avoid people who have a sickness you can catch (contagious).  · Keep all follow-up visits as told by your doctor. This is important.  Contact a doctor if:  · You cough up more mucus than usual.  · There is a change in the color or thickness of the mucus.  · It is harder to breathe than usual.  · Your breathing is faster than usual.  · You have trouble sleeping.  · You need to use your medicines more often than usual.  · You have trouble doing your normal activities such as getting dressed or walking around the house.  Get help right away if:  · You have shortness of breath while resting.  · You have shortness of breath that stops you from:  ? Being able to talk.  ? Doing normal activities.  · Your chest hurts for longer than 5 minutes.  · Your skin color is more blue than usual.  · Your pulse oximeter shows that you have low oxygen for longer than 5 minutes.  · You have a fever.  · You feel too tired to breathe normally.  Summary  · Chronic obstructive pulmonary disease (COPD) is a long-term lung problem.  · The way your  lungs work will never return to normal. Usually the condition gets worse over time. There are things you can do to keep yourself as healthy as possible.  · Take over-the-counter and prescription medicines only as told by your doctor.  · If you smoke, stop. Smoking makes the problem worse.  This information is not intended to replace advice given to you by your health care provider. Make sure you discuss any questions you have with your health care provider.  Document Released: 06/05/2009 Document Revised: 11/30/2018 Document Reviewed: 01/22/2018  [employer] Patient Education © 2020 [employer]  Smoking Cessation, Tips for Success  If you are ready to quit smoking, congratulations! You have chosen to help yourself be healthier. Cigarettes bring nicotine, tar, carbon monoxide, and other irritants into your body. Your lungs, heart, and blood vessels will be able to work better without these poisons. There are many different ways to quit smoking. Nicotine gum, nicotine patches, a nicotine inhaler, or nicotine nasal spray can help with physical craving. Hypnosis, support groups, and medicines help break the habit of smoking.  WHAT THINGS CAN I DO TO MAKE QUITTING EASIER?   Here are some tips to help you quit for good:  · Pick a date when you will quit smoking completely. Tell all of your friends and family about your plan to quit on that date.  · Do not try to slowly cut down on the number of cigarettes you are smoking. Pick a quit date and quit smoking completely starting on that day.  · Throw away all cigarettes.    · Clean and remove all ashtrays from your home, work, and car.  · On a card, write down your reasons for quitting. Carry the card with you and read it when you get the urge to smoke.  · Cleanse your body of nicotine. Drink enough water and fluids to keep your urine clear or pale yellow. Do this after quitting to flush the nicotine from your body.  · Learn to predict your moods. Do not let a bad situation be  "your excuse to have a cigarette. Some situations in your life might tempt you into wanting a cigarette.  · Never have \"just one\" cigarette. It leads to wanting another and another. Remind yourself of your decision to quit.  · Change habits associated with smoking. If you smoked while driving or when feeling stressed, try other activities to replace smoking. Stand up when drinking your coffee. Brush your teeth after eating. Sit in a different chair when you read the paper. Avoid alcohol while trying to quit, and try to drink fewer caffeinated beverages. Alcohol and caffeine may urge you to smoke.  · Avoid foods and drinks that can trigger a desire to smoke, such as sugary or spicy foods and alcohol.  · Ask people who smoke not to smoke around you.  · Have something planned to do right after eating or having a cup of coffee. For example, plan to take a walk or exercise.  · Try a relaxation exercise to calm you down and decrease your stress. Remember, you may be tense and nervous for the first 2 weeks after you quit, but this will pass.  · Find new activities to keep your hands busy. Play with a pen, coin, or rubber band. Doodle or draw things on paper.  · Brush your teeth right after eating. This will help cut down on the craving for the taste of tobacco after meals. You can also try mouthwash.    · Use oral substitutes in place of cigarettes. Try using lemon drops, carrots, cinnamon sticks, or chewing gum. Keep them handy so they are available when you have the urge to smoke.  · When you have the urge to smoke, try deep breathing.  · Designate your home as a nonsmoking area.  · If you are a heavy smoker, ask your health care provider about a prescription for nicotine chewing gum. It can ease your withdrawal from nicotine.  · Reward yourself. Set aside the cigarette money you save and buy yourself something nice.  · Look for support from others. Join a support group or smoking cessation program. Ask someone at home or " "at work to help you with your plan to quit smoking.  · Always ask yourself, \"Do I need this cigarette or is this just a reflex?\" Tell yourself, \"Today, I choose not to smoke,\" or \"I do not want to smoke.\" You are reminding yourself of your decision to quit.  · Do not replace cigarette smoking with electronic cigarettes (commonly called e-cigarettes). The safety of e-cigarettes is unknown, and some may contain harmful chemicals.  · If you relapse, do not give up! Plan ahead and think about what you will do the next time you get the urge to smoke.  HOW WILL I FEEL WHEN I QUIT SMOKING?  You may have symptoms of withdrawal because your body is used to nicotine (the addictive substance in cigarettes). You may crave cigarettes, be irritable, feel very hungry, cough often, get headaches, or have difficulty concentrating. The withdrawal symptoms are only temporary. They are strongest when you first quit but will go away within 10-14 days. When withdrawal symptoms occur, stay in control. Think about your reasons for quitting. Remind yourself that these are signs that your body is healing and getting used to being without cigarettes. Remember that withdrawal symptoms are easier to treat than the major diseases that smoking can cause.   Even after the withdrawal is over, expect periodic urges to smoke. However, these cravings are generally short lived and will go away whether you smoke or not. Do not smoke!  WHAT RESOURCES ARE AVAILABLE TO HELP ME QUIT SMOKING?  Your health care provider can direct you to community resources or hospitals for support, which may include:  · Group support.  · Education.  · Hypnosis.  · Therapy.     This information is not intended to replace advice given to you by your health care provider. Make sure you discuss any questions you have with your health care provider.     Document Released: 09/15/2005 Document Revised: 01/08/2016 Document Reviewed: 06/05/2014  [employer] Interactive Patient Education " ©2016 [employer]  Discharge Instructions    Discharged to home by car with relative. Discharged via wheelchair, hospital escort: Yes.  Special equipment needed: Oxygen    Be sure to schedule a follow-up appointment with your primary care doctor or any specialists as instructed.     Discharge Plan:        I understand that a diet low in cholesterol, fat, and sodium is recommended for good health. Unless I have been given specific instructions below for another diet, I accept this instruction as my diet prescription.   Other diet: Regular diet as tolerated      Special Instructions: Chronic Obstructive Pulmonary Disease (COPD) is a long-term, progressive lung disease that makes it harder to breathe. It includes chronic bronchitis, emphysema, and refractory (non-reversible) asthma. With COPD, the airways in your lungs become inflamed and thicken, and the tissue where oxygen is exchanged is destroyed. The flow of air in and out of your lungs decreases. When that happens, less oxygen gets into your body tissues, and it becomes harder to get rid of the waste gas carbon dioxide. As the disease gets worse, shortness of breath makes it harder to remain active. There is no cure for COPD, but it is preventable and treatable.    COPD Patient Discharge Instructions    • Diet  o Follow a low fat, low cholesterol, low salt diet unless instructed otherwise by your Doctor. Read the labels on the back of food products and track your intake of fat, cholesterol and salt.  • No smoking  o Discontinuing smoking will have the biggest impact on preventing progression of disease.  o To participate in [employer] Quit Tobacco Program, call [phone] or visit [email or web address]  • Oxygen  o If your doctor has order that you wear oxygen at home, it is important to wear it as ordered.  o Do not smoke, vape, or use e-cigarettes with oxygen on.  o Store in an appropriate location: upright in its art or laid flat down, away from open  flames and stoves.   o Do not use oil-based creams and moisturizers (ie: petroleum products, oil-based lip moisturizers) or aerosol sprays (ie: hair sprays or deodorants) when using your oxygen equipment.  o Be careful with tubing placement to prevent yourself and others from tripping.  • Medications  o Refer to your personalized Action Plan to manage your symptoms.  • Warning signs of an exacerbation  o Breathing fast and shallow, worsening shortness of breath (like you just finished exercising)  o Chest tightness  o Increases in sputum production  o Changes in sputum color  o Lower oxygen levels than baseline  • When to call your doctor  o If the warning signs of an exacerbation do not improve  o Refer to your personalized Action Plan   • Pulmonary Rehab  o Your doctor has ordered you a referral to Pulmonary Rehab. Call [phone] to schedule an appointment  • Attend your follow-up appointment with your PCP and/or Pulmonologist  • Remote Monitoring: At the direction of the remote monitoring on-call provider, you may increase your oxygen by 2 liters above your baseline.     See the educational handout provided by your COPD Navigator for more information. This also explains more about COPD, symptoms of an exacerbation, and some of the tests that you have undergone.    · Is patient discharged on Warfarin / Coumadin?   Yes    You are receiving the drug warfarin. Please understand the importance of monitoring warfarin with scheduled PT/INR blood draws.  Follow-up with the [facility] in one week for INR lab..    IMPORTANT: HOW TO USE THIS INFORMATION:  This is a summary and does NOT have all possible information about this product. This information does not assure that this product is safe, effective, or appropriate for you. This information is not individual medical advice and does not substitute for the advice of your health care professional. Always ask your health care professional for complete information about  "this product and your specific health needs.      WARFARIN - ORAL (WARF-uh-rin)      COMMON BRAND NAME(S): Coumadin      WARNING:  Warfarin can cause very serious (possibly fatal) bleeding. This is more likely to occur when you first start taking this medication or if you take too much warfarin. To decrease your risk for bleeding, your doctor or other health care provider will monitor you closely and check your lab results (INR test) to make sure you are not taking too much warfarin. Keep all medical and laboratory appointments. Tell your doctor right away if you notice any signs of serious bleeding. See also Side Effects section.      USES:  This medication is used to treat blood clots (such as in deep vein thrombosis-DVT or pulmonary embolus-PE) and/or to prevent new clots from forming in your body. Preventing harmful blood clots helps to reduce the risk of a stroke or heart attack. Conditions that increase your risk of developing blood clots include a certain type of irregular heart rhythm (atrial fibrillation), heart valve replacement, recent heart attack, and certain surgeries (such as hip/knee replacement). Warfarin is commonly called a \"blood thinner,\" but the more correct term is \"anticoagulant.\" It helps to keep blood flowing smoothly in your body by decreasing the amount of certain substances (clotting proteins) in your blood.      HOW TO USE:  Read the Medication Guide provided by your pharmacist before you start taking warfarin and each time you get a refill. If you have any questions, ask your doctor or pharmacist. Take this medication by mouth with or without food as directed by your doctor or other health care professional, usually once a day. It is very important to take it exactly as directed. Do not increase the dose, take it more frequently, or stop using it unless directed by your doctor. Dosage is based on your medical condition, laboratory tests (such as INR), and response to treatment. Your " doctor or other health care provider will monitor you closely while you are taking this medication to determine the right dose for you. Use this medication regularly to get the most benefit from it. To help you remember, take it at the same time each day. It is important to eat a balanced, consistent diet while taking warfarin. Some foods can affect how warfarin works in your body and may affect your treatment and dose. Avoid sudden large increases or decreases in your intake of foods high in vitamin K (such as broccoli, cauliflower, cabbage, brussels sprouts, kale, spinach, and other green leafy vegetables, liver, green tea, certain vitamin supplements). If you are trying to lose weight, check with your doctor before you try to go on a diet. Cranberry products may also affect how your warfarin works. Limit the amount of cranberry juice (16 ounces/480 milliliters a day) or other cranberry products you may drink or eat.      SIDE EFFECTS:  Nausea, loss of appetite, or stomach/abdominal pain may occur. If any of these effects persist or worsen, tell your doctor or pharmacist promptly. Remember that your doctor has prescribed this medication because he or she has judged that the benefit to you is greater than the risk of side effects. Many people using this medication do not have serious side effects. This medication can cause serious bleeding if it affects your blood clotting proteins too much (shown by unusually high INR lab results). Even if your doctor stops your medication, this risk of bleeding can continue for up to a week. Tell your doctor right away if you have any signs of serious bleeding, including: unusual pain/swelling/discomfort, unusual/easy bruising, prolonged bleeding from cuts or gums, persistent/frequent nosebleeds, unusually heavy/prolonged menstrual flow, pink/dark urine, coughing up blood, vomit that is bloody or looks like coffee grounds, severe headache, dizziness/fainting, unusual or  persistent tiredness/weakness, bloody/black/tarry stools, chest pain, shortness of breath, difficulty swallowing. Tell your doctor right away if any of these unlikely but serious side effects occur: persistent nausea/vomiting, severe stomach/abdominal pain, yellowing eyes/skin. This drug rarely has caused very serious (possibly fatal) problems if its effects lead to small blood clots (usually at the beginning of treatment). This can lead to severe skin/tissue damage that may require surgery or amputation if left untreated. Patients with certain blood conditions (protein C or S deficiency) may be at greater risk. Get medical help right away if any of these rare but serious side effects occur: painful/red/purplish patches on the skin (such as on the toe, breast, abdomen), change in the amount of urine, vision changes, confusion, slurred speech, weakness on one side of the body. A very serious allergic reaction to this drug is rare. However, get medical help right away if you notice any symptoms of a serious allergic reaction, including: rash, itching/swelling (especially of the face/tongue/throat), severe dizziness, trouble breathing. This is not a complete list of possible side effects. If you notice other effects not listed above, contact your doctor or pharmacist. In the [place] - Call your doctor for medical advice about side effects. You may report side effects to FDA at 0-277-NFR-6750. In [place] - Call your doctor for medical advice about side effects. You may report side effects to [facility] [place] at [phone].      PRECAUTIONS:  Before taking warfarin, tell your doctor or pharmacist if you are allergic to it; or if you have any other allergies. This product may contain inactive ingredients, which can cause allergic reactions or other problems. Talk to your pharmacist for more details. Before using this medication, tell your doctor or pharmacist your medical history, especially of: blood disorders (such as  anemia, hemophilia), bleeding problems (such as bleeding of the stomach/intestines, bleeding in the brain), blood vessel disorders (such as aneurysms), recent major injury/surgery, liver disease, alcohol use, mental/mood disorders (including memory problems), frequent falls/injuries. It is important that all your doctors and dentists know that you take warfarin. Before having surgery or any medical/dental procedures, tell your doctor or dentist that you are taking this medication and about all the products you use (including prescription drugs, nonprescription drugs, and herbal products). Avoid getting injections into the muscles. If you must have an injection into a muscle (for example, a flu shot), it should be given in the arm. This way, it will be easier to check for bleeding and/or apply pressure bandages. This medication may cause stomach bleeding. Daily use of alcohol while using this medicine will increase your risk for stomach bleeding and may also affect how this medication works. Limit or avoid alcoholic beverages. If you have not been eating well, if you have an illness or infection that causes fever, vomiting, or diarrhea for more than 2 days, or if you start using any antibiotic medications, contact your doctor or pharmacist immediately because these conditions can affect how warfarin works. This medication can cause heavy bleeding. To lower the chance of getting cut, bruised, or injured, use great caution with sharp objects like safety razors and nail cutters. Use an electric razor when shaving and a soft toothbrush when brushing your teeth. Avoid activities such as contact sports. If you fall or injure yourself, especially if you hit your head, call your doctor immediately. Your doctor may need to check you. The Food & Drug Administration has stated that generic warfarin products are interchangeable. However, consult your doctor or pharmacist before switching warfarin products. Be careful not to take  "more than one medication that contains warfarin unless specifically directed by the doctor or health care provider who is monitoring your warfarin treatment. Older adults may be at greater risk for bleeding while using this drug. This medication is not recommended for use during pregnancy because of serious (possibly fatal) harm to an unborn baby. Discuss the use of reliable forms of birth control with your doctor. If you become pregnant or think you may be pregnant, tell your doctor immediately. If you are planning pregnancy, discuss a plan for managing your condition with your doctor before you become pregnant. Your doctor may switch the type of medication you use during pregnancy. Very small amounts of this medication may pass into breast milk but is unlikely to harm a nursing infant. Consult your doctor before breast-feeding.      DRUG INTERACTIONS:  Drug interactions may change how your medications work or increase your risk for serious side effects. This document does not contain all possible drug interactions. Keep a list of all the products you use (including prescription/nonprescription drugs and herbal products) and share it with your doctor and pharmacist. Do not start, stop, or change the dosage of any medicines without your doctor's approval. Warfarin interacts with many prescription, nonprescription, vitamin, and herbal products. This includes medications that are applied to the skin or inside the vagina or rectum. The interactions with warfarin usually result in an increase or decrease in the \"blood-thinning\" (anticoagulant) effect. Your doctor or other health care professional should closely monitor you to prevent serious bleeding or clotting problems. While taking warfarin, it is very important to tell your doctor or pharmacist of any changes in medications, vitamins, or herbal products that you are taking. Some products that may interact with this drug include: capecitabine, imatinib, mifepristone. " Aspirin, aspirin-like drugs (salicylates), and nonsteroidal anti-inflammatory drugs (NSAIDs such as ibuprofen, naproxen, celecoxib) may have effects similar to warfarin. These drugs may increase the risk of bleeding problems if taken during treatment with warfarin. Carefully check all prescription/nonprescription product labels (including drugs applied to the skin such as pain-relieving creams) since the products may contain NSAIDs or salicylates. Talk to your doctor about using a different medication (such as acetaminophen) to treat pain/fever. Low-dose aspirin and related drugs (such as clopidogrel, ticlopidine) should be continued if prescribed by your doctor for specific medical reasons such as heart attack or stroke prevention. Consult your doctor or pharmacist for more details. Many herbal products interact with warfarin. Tell your doctor before taking any herbal products, especially bromelains, coenzyme Q10, cranberry, danshen, dong quai, fenugreek, garlic, ginkgo biloba, ginseng, and Zahra's wort, among others. This medication may interfere with a certain laboratory test to measure theophylline levels, possibly causing false test results. Make sure laboratory personnel and all your doctors know you use this drug.      OVERDOSE:  If overdose is suspected, contact a poison control center or emergency room immediately. [place] residents can call the [place] National Poison Hotline at 1-952.665.3241. [place] residents can call a provincial poison control center. Symptoms of overdose may include: bloody/black/tarry stools, pink/dark urine, unusual/prolonged bleeding.      NOTES:  Do not share this medication with others. Laboratory and/or medical tests (such as INR, complete blood count) must be performed periodically to monitor your progress or check for side effects. Consult your doctor for more details.      MISSED DOSE:  For the best possible benefit, do not miss any doses. If you do miss a dose and remember on the  same day, take it as soon as you remember. If you remember on the next day, skip the missed dose and resume your usual dosing schedule. Do not double the dose to catch up because this could increase your risk for bleeding. Keep a record of missed doses to give to your doctor or pharmacist. Contact your doctor or pharmacist if you miss 2 or more doses in a row.      STORAGE:  Store at room temperature away from light and moisture. Do not store in the bathroom. Keep all medications away from children and pets. Do not flush medications down the toilet or pour them into a drain unless instructed to do so. Properly discard this product when it is  or no longer needed. Consult your pharmacist or local waste disposal company for more details about how to safely discard your product.      MEDICAL ALERT:  Your condition and medication can cause complications in a medical emergency. For information about enrolling in MedicAlert, call 1-116.424.2635 ([place]) or 1-174.428.1022 ([place]).      Information last revised 2010 Copyright(c) 2010 [employer]             Depression / Suicide Risk    As you are discharged from this [facility] facility, it is important to learn how to keep safe from harming yourself.    Recognize the warning signs:  · Abrupt changes in personality, positive or negative- including increase in energy   · Giving away possessions  · Change in eating patterns- significant weight changes-  positive or negative  · Change in sleeping patterns- unable to sleep or sleeping all the time   · Unwillingness or inability to communicate  · Depression  · Unusual sadness, discouragement and loneliness  · Talk of wanting to die  · Neglect of personal appearance   · Rebelliousness- reckless behavior  · Withdrawal from people/activities they love  · Confusion- inability to concentrate     If you or a loved one observes any of these behaviors or has concerns about self-harm, here's what you can  do:  · Talk about it- your feelings and reasons for harming yourself  · Remove any means that you might use to hurt yourself (examples: pills, rope, extension cords, firearm)  · Get professional help from the community (Mental Health, Substance Abuse, psychological counseling)  · Do not be alone:Call your Safe Contact- someone whom you trust who will be there for you.  · Call your local CRISIS HOTLINE 736-5275 or 215-511-6699  · Call your local Children's Mobile Crisis Response Team [place] (876) 117-5757 or wwwMetaJure  · Call the toll free National Suicide Prevention Hotlines   · National Suicide Prevention Lifeline 171-183-MAGH (9613)  · National [employer] 800-SUICIDE (694-3022)  COVID-19 results pending    1. Return home and continue self-isolation until you get your test result back.  If positive: You will be called by [facility] staff and will be getting a MyChart message    Stay home and continue self isolation until:  · At least ten (10) days have passed since symptoms first appeared AND  · At least 24 hours have passed from last fever without the use of fever-reducing medications AND  · Symptoms have improved (eg: cough or shortness of breath)    If negative: You will be getting a MyChart message or a letter from [facility].  · Stay home until you have no fever for 24 hours and your symptoms (cough and shortness of breath) have resolved.    You may call [facility] ER Discharge Culture Line at [phone] for results/questions.    2. Even after the above are met, maintain social distance from others (at least 6 feet) and practice frequent hand washing.    3. Wear a face mask in public places.

## 2021-07-27 NOTE — PROGRESS NOTES
Assumed care at 0700 following night nurse report, assessment completed. Patient is A&O X4. Patient complains of chest, no medication administered. Fall precautions and appropriate signs in place. Call light/phone system and RN extension updated on whiteboard. Bed alarm is in use, patient is stand-by assist. Patient denies any additional needs at this time, Hourly rounding in place.

## 2021-07-27 NOTE — PROGRESS NOTES
OP Anticoagulation Service Note    Date: 7/27/2021    Anticoagulation Summary  As of 7/27/2021    INR goal:  2.0-3.0   TTR:  20.1 % (3.3 mo)   INR used for dosing:  3.12 (7/26/2021)   Warfarin maintenance plan:  9 mg (6 mg x 1.5) every Sun; 6 mg (6 mg x 1) all other days   Weekly warfarin total:  45 mg   Plan last modified:  Kinjal Gant, PharmD (6/10/2021)   Next INR check:  7/30/2021   Target end date:  Indefinite    Indications    Paroxysmal A-fib (HCC) [I48.0]             Anticoagulation Episode Summary     INR check location:      Preferred lab:      Send INR reminders to:      Comments:  [facility]         Anticoagulation Patient Findings        HPI:   The reason for today's call is to prevent morbidity and mortality from a blood clot and/or stroke and to reduce the risk of bleeding while on a anticoagulant.     PCP:  SHANKAR Gleason  [address] [place] [phone]    Assessment:     • INR  therapeutic.     No current facility-administered medications for this visit.    Current Outpatient Medications:   •  predniSONE, 40 mg, Oral, DAILY    Facility-Administered Medications Ordered in Other Visits:   •  [START ON 7/28/2021] predniSONE  •  warfarin  •  nicotine **AND** Nicotine Replacement Patient Education Materials **AND** nicotine polacrilex  •  budesonide  •  ipratropium-albuterol  •  guaiFENesin dextromethorphan  •  furosemide  •  guaiFENesin ER  •  menthol  •  acetaminophen  •  cloNIDine  •  enalaprilat  •  labetalol  •  ondansetron  •  ondansetron  •  promethazine  •  promethazine  •  prochlorperazine  •  Respiratory Therapy Consult  •  ARIPiprazole  •  atorvastatin  •  famotidine  •  montelukast  •  potassium chloride SA  •  MD Alert...Warfarin per Pharmacy      Plan:     • Continue the same warfarin dose, as noted above.       Follow-up:     • Our protocol suggests we test in 3 days .        Additional information discussed with patient:     • Asked patient to please call the  anticoagulation clinic if they have any signs/symptoms of bleeding and/or thrombosis or any changes to diet or medications.      National recommendations regarding anticoagulation therapy:     The CHEST guidelines recommends frequent INR monitoring at regular intervals (a few days up to a max of 12 weeks) to ensure patients are on the proper dose of warfarin, and patients are not having any complications from therapy.  INRs can dramatically change over a short time period due to diet, medications, and medical conditions.       Shaw Read, PharmD, MS, BCACP, C  [facility]  Phone [phone] fax [phone]    This note was created using voice recognition software (Dragon). The accuracy of the dictation is limited by the abilities of the software. I have reviewed the note prior to signing, however some errors in grammar and context are still possible. If you have any questions related to this note please do not hesitate to contact our office.

## 2021-07-27 NOTE — CARE PLAN
The patient is Stable - Low risk of patient condition declining or worsening    Shift Goals  Clinical Goals: Reach baseline oxygen needs  Patient Goals: Rest    Progress made toward(s) clinical / shift goals:  Oxygen has reached baseline use, home O2 is set-up.    Patient is not progressing towards the following goals:Patient states she continues to smoke.      Problem: Knowledge Deficit - COPD  Goal: Patient/significant other demonstrates understanding of disease process, utilization of the Action Plan, medications and discharge instruction  Outcome: Not Progressing     Problem: Ineffective Airway Clearance  Goal: Patient will maintain patent airway with clear/clearing breath sounds  Outcome: Progressing   Patient is at baseline oxygen use.

## 2021-07-27 NOTE — DISCHARGE PLANNING
Care Transition Team Assessment      Anticipated Discharge Disposition: home with HH     Action: Met at bedside with pt for assessment. Pt confirms info on face sheet is correct.   She reports she lives alone in an apartment and has her mother and sister who live 6 blocks away from her for support. Pt PCP is DANIEL Gleason. Pt uses [facility] pharmacy on [address]. Pt uses O2 2 liters at home and her DME provider is [facility]. Pt has a rollator at bedside.  She has Medicaid FFS with RX coverage. Pt with frequent ED encounters.    Reviewed in IDT rounds yesterday with Dr Gallardo. Pt not yet med cleared for dc. Dr Gallardo ordered HH for nsg only. Voalte texted Dr Finch to add PT/OT to home health order. [facility]. Choice form faxed to GISELE Dhillon, to send referral.  PEARL Rivers aware of above.     Barriers to Discharge: not med cleared    Plan: plan dc to home with HH when med cleared.       Addendum 7-27-21 at 12:25-pt has been [facility]. Notified PEARL Rivers.           Information Source  Orientation Level: Oriented X4  Information Given By: Patient  Informant's Name:  (Lorene)  Who is responsible for making decisions for patient? : Patient    Readmission Evaluation  Is this a readmission?: Yes - unplanned readmission  Why do you think you were readmitted?: not feeling well  Was an appointment arranged for you prior to discharge?: Yes, attended appointment    Elopement Risk  Legal Hold: No  Ambulatory or Self Mobile in Wheelchair: Yes  Disoriented: No  Psychiatric Symptoms: None  History of Wandering: No  Elopement this Admit: No  Vocalizing Wanting to Leave: No  Displays Behaviors, Body Language Wanting to Leave: No-Not at Risk for Elopement  Elopement Risk: Not at Risk for Elopement    Interdisciplinary Discharge Planning  Does Admitting Nurse Feel This Could be a Complex Discharge?: Yes  Primary Care Physician: Sheridan Dinh  APN  Lives with - Patient's Self Care Capacity: Alone and Able to Care For Self  Patient or legal guardian wants to designate a caregiver: Yes  Caregiver name: Lauryn Ramirez  Caregiver contact info: [phone]  ([facility] Authorization for Release of Health Information has been completed: Yes  Support Systems: Family Member(s)  Housing / Facility: 2 Story Apartment / Condo  Do You Take your Prescribed Medications Regularly: Yes  Able to Return to Previous ADL's: Future Time w/Therapy  Mobility Issues: Yes  Prior Services: Skilled Home Health Services  Patient Prefers to be Discharged to::  (Home with HH)  Assistance Needed: Yes  Durable Medical Equipment: Home Oxygen, Commode  DME Provider / Phone:  ([facility]    Discharge Preparedness  What is your plan after discharge?: Home health care  What are your discharge supports?: Parent, Sibling  Prior Functional Level: Needs Assist with Activities of Daily Living  Difficulity with ADLs: Bathing    Functional Assesment  Prior Functional Level: Needs Assist with Activities of Daily Living    Finances  Prescription Coverage: Yes    Vision / Hearing Impairment  Vision Impairment : No  Hearing Impairment : No         Advance Directive  Advance Directive?: DPOA for Health Care    Domestic Abuse  Have you ever been the victim of abuse or violence?: No  Physical Abuse or Sexual Abuse: No  Verbal Abuse or Emotional Abuse: No  Possible Abuse/Neglect Reported to:: Not Applicable    Psychological Assessment  History of Psychiatric Problems: Yes    Discharge Risks or Barriers  Discharge risks or barriers?: Post-acute placement / services    Anticipated Discharge Information  Discharge Disposition: D/T to home under HHA care in anticipation of covered skilled care (06)
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
## 2021-07-27 NOTE — DISCHARGE PLANNING
ATTN: Case Management  RE: Referral for Home Health    As of 7/27/21, we have accepted the Home Health referral for the patient listed above.    A [facility] clinician will be out to see the patient within 48 hours. If you have any questions or concerns regarding the patient’s transition to Home Health, please do not hesitate to contact us at [phone].      We look forward to collaborating with you,  [facility] Team

## 2021-07-30 ENCOUNTER — HOME CARE VISIT (OUTPATIENT)
Dept: HOME HEALTH SERVICES | Facility: HOME HEALTHCARE | Age: 46
End: 2021-07-30
Payer: MEDICAID

## 2021-07-30 ENCOUNTER — ANTICOAGULATION MONITORING (OUTPATIENT)
Dept: MEDICAL GROUP | Facility: PHYSICIAN GROUP | Age: 46
End: 2021-07-30

## 2021-07-30 VITALS
DIASTOLIC BLOOD PRESSURE: 64 MMHG | BODY MASS INDEX: 53.05 KG/M2 | HEART RATE: 58 BPM | SYSTOLIC BLOOD PRESSURE: 108 MMHG | OXYGEN SATURATION: 93 % | WEIGHT: 281 LBS | RESPIRATION RATE: 16 BRPM | TEMPERATURE: 97.4 F | HEIGHT: 61 IN

## 2021-07-30 DIAGNOSIS — I48.0 PAROXYSMAL A-FIB (HCC): ICD-10-CM

## 2021-07-30 DIAGNOSIS — I48.0 PAROXYSMAL ATRIAL FIBRILLATION (HCC): ICD-10-CM

## 2021-07-30 LAB
INR PPP: 1.4 (ref 2–3.5)
INR PPP: 1.4 - CRITICAL LOW: < 0.8, CRITICAL HIGH: > 6.5 (ref 2–3.5)

## 2021-07-30 PROCEDURE — G0493 RN CARE EA 15 MIN HH/HOSPICE: HCPCS

## 2021-07-30 ASSESSMENT — FIBROSIS 4 INDEX: FIB4 SCORE: 1.07

## 2021-07-30 NOTE — PROGRESS NOTES
OP Anticoagulation Service Note    Date: 2021    Anticoagulation Summary  As of 2021    INR goal:  2.0-3.0   TTR:  20.1 % (3.3 mo)   INR used for dosin.40 (2021)   Warfarin maintenance plan:  9 mg (6 mg x 1.5) every Sun, Fri, Sat; 6 mg (6 mg x 1) all other days   Weekly warfarin total:  51 mg   Plan last modified:  Shaw Read, PharmD (2021)   Next INR check:  2021   Target end date:  Indefinite    Indications    Paroxysmal A-fib (HCC) [I48.0]             Anticoagulation Episode Summary     INR check location:      Preferred lab:      Send INR reminders to:      Comments:  [facility]         Anticoagulation Patient Findings      Voice message for patient regarding their anticoagulant.     HPI:   The reason for today's call is to prevent morbidity and mortality from a blood clot and/or stroke and to reduce the risk of bleeding while on a anticoagulant.     PCP:  SHANKAR Gleason  [address] [place] [phone]    Assessment:     • INR  sub-therapeutic.       Current Outpatient Medications:   •  predniSONE, 40 mg, Oral, DAILY  •  fluticasone, 1-2 Spray, Nasal, BID  •  fexofenadine-pseudoephedrine, 1 tablet, Oral, BID  •  famotidine, 40 mg, Oral, DAILY  •  warfarin, 6 mg, Oral, DAILY  •  nitroglycerin, 0.4 mg, Sublingual, PRN  •  albuterol, 2 Puff, Inhalation, Q6HRS PRN  •  acetaminophen, 325 mg, Oral, Q6HRS PRN  •  furosemide, 20 mg, Oral, BID  •  potassium chloride SA, 20 mEq, Oral, DAILY  •  montelukast, 10 mg, Oral, QHS  •  atorvastatin, 20 mg, Oral, DAILY  •  Spiriva HandiHaler, 18 mcg, Inhalation, DAILY  •  aripiprazole, 30 mg, Oral, QAM      Plan:     • Increase weekly warfarin dose as noted      Follow-up:     • Our protocol suggests we test in 3 days         Additional information discussed with patient:     • Asked patient to please call the anticoagulation clinic if they have any signs/symptoms of bleeding and/or thrombosis or any changes to diet or medications.       National recommendations regarding anticoagulation therapy:     The CHEST guidelines recommends frequent INR monitoring at regular intervals (a few days up to a max of 12 weeks) to ensure patients are on the proper dose of warfarin, and patients are not having any complications from therapy.  INRs can dramatically change over a short time period due to diet, medications, and medical conditions.       Shaw Read, PharmD, MS, BCACP, [facility]  Phone [phone] fax [phone]    This note was created using voice recognition software (Dragon). The accuracy of the dictation is limited by the abilities of the software. I have reviewed the note prior to signing, however some errors in grammar and context are still possible. If you have any questions related to this note please do not hesitate to contact our office.

## 2021-07-31 SDOH — ECONOMIC STABILITY: HOUSING INSECURITY: EVIDENCE OF SMOKING MATERIAL: 0

## 2021-07-31 SDOH — ECONOMIC STABILITY: HOUSING INSECURITY: HOME SAFETY: SENT IN THE HOME.

## 2021-07-31 SDOH — ECONOMIC STABILITY: HOUSING INSECURITY
HOME SAFETY: OME. SMOKE ALARMS ARE PRESENT AND FUNCTIONAL ON EACH LEVEL OF THE HOME. PATIENT DOES HAVE A FIRE ESCAPE PLAN DEVELOPED. PATIENT DOES NOT HAVE FLAMMABLE MATERIALS PRESENT IN THE HOME PRESENTING A FIRE HAZARD. NO EVIDENCE FOUND OF SMOKING MATERIALS PRE

## 2021-07-31 SDOH — ECONOMIC STABILITY: HOUSING INSECURITY
HOME SAFETY: COPIES ARE IN THE HOME FROM PREVIOUS VISITS.  PT. STATES SHE IS NOT SMOKING FOR NOW"  OXYGEN SAFETY RISK ASSESSMENT PERFORMED. PATIENT DOES NOT HAVE A NO SMOKING SIGN POSTED IN THE HOME. PATIENT DOES HAVE A WORKING FIRE EXTINGUISHER PRESENT IN THE H"

## 2021-07-31 ASSESSMENT — ENCOUNTER SYMPTOMS
AGITATION: 1
VOMITING: DENIES
NAUSEA: DENIES
PAIN LOCATION - PAIN SEVERITY: 3/10
PAIN LOCATION - PAIN QUALITY: DULL ACHE
SUBJECTIVE PAIN PROGRESSION: UNCHANGED
PAIN LOCATION - PAIN DURATION: 10-20 MIN
PAIN LOCATION: GENERALIZED
HIGHEST PAIN SEVERITY IN PAST 24 HOURS: 3/10
PAIN LOCATION - EXACERBATING FACTORS: TWISTING
PERSON REPORTING PAIN: PATIENT
PAIN SEVERITY GOAL: 0/10
DEPRESSED MOOD: 1
PAIN LOCATION - PAIN FREQUENCY: INFREQUENT
POOR JUDGMENT: 1
THOUGHT CONTENT - SUSPICIOUS: 1
LOWEST PAIN SEVERITY IN PAST 24 HOURS: 2/10
PAIN: 1
SHORTNESS OF BREATH: T

## 2021-07-31 ASSESSMENT — PATIENT HEALTH QUESTIONNAIRE - PHQ9
CLINICAL INTERPRETATION OF PHQ2 SCORE: 2
1. LITTLE INTEREST OR PLEASURE IN DOING THINGS: 01
5. POOR APPETITE OR OVEREATING: 1 - SEVERAL DAYS
2. FEELING DOWN, DEPRESSED, IRRITABLE, OR HOPELESS: 01
SUM OF ALL RESPONSES TO PHQ QUESTIONS 1-9: 8

## 2021-07-31 ASSESSMENT — ACTIVITIES OF DAILY LIVING (ADL): OASIS_M1830: 05

## 2021-07-31 NOTE — CASE COMMUNICATION
Primary dx/Skilled need:Acute on chronic Resp. failure, Subtherapeutic anticoagulation. Bipolar/depression, O2 dependent.  Skilled need: Safety and Anticoagulation(POC INR)   Assess Medication Response and Effectiveness, Cardiac Assessment and Education, Education and Management of Chronic Disease, Home Exercise Program For Function and Safety and Other: Depression assessment/intervention.   SN frequency.1wk1, 2wk4, 1wk4  Zip code. [address]  Disciplines ordered. SN PT OT  Insurance and authorization. Medicaid  Certification period. 7/19/21 to 9/16/2021  Special considerations.[facility].

## 2021-08-02 ENCOUNTER — DOCUMENTATION (OUTPATIENT)
Dept: MEDICAL GROUP | Facility: PHYSICIAN GROUP | Age: 46
End: 2021-08-02

## 2021-08-02 ENCOUNTER — ANTICOAGULATION MONITORING (OUTPATIENT)
Dept: MEDICAL GROUP | Facility: PHYSICIAN GROUP | Age: 46
End: 2021-08-02

## 2021-08-02 ENCOUNTER — HOME CARE VISIT (OUTPATIENT)
Dept: HOME HEALTH SERVICES | Facility: HOME HEALTHCARE | Age: 46
End: 2021-08-02
Payer: MEDICAID

## 2021-08-02 VITALS
HEART RATE: 78 BPM | TEMPERATURE: 98.2 F | RESPIRATION RATE: 18 BRPM | DIASTOLIC BLOOD PRESSURE: 70 MMHG | OXYGEN SATURATION: 100 % | SYSTOLIC BLOOD PRESSURE: 106 MMHG

## 2021-08-02 DIAGNOSIS — I48.0 PAROXYSMAL A-FIB (HCC): Primary | ICD-10-CM

## 2021-08-02 LAB
INR PPP: 3.2 (ref 2–3.5)
INR PPP: 3.2 - CRITICAL LOW: < 0.8, CRITICAL HIGH: > 6.5 (ref 2–3.5)

## 2021-08-02 PROCEDURE — G0299 HHS/HOSPICE OF RN EA 15 MIN: HCPCS

## 2021-08-02 ASSESSMENT — ENCOUNTER SYMPTOMS
SHORTNESS OF BREATH: T
PAIN: 1
MUSCLE WEAKNESS: 1
SEVERE DYSPNEA: 1
ARTHRALGIAS: 1
PERSON REPORTING PAIN: PATIENT

## 2021-08-02 NOTE — PROGRESS NOTES
Medication chart review for [facility] services    PCP:  SHANKAR Gleason  [address] [facility] [address]  Fax: [phone]    Current medication list     Current Outpatient Medications:   •  Home Care Oxygen, 2 L/min, Inhalation, Continuous  •  fluticasone, 1-2 Spray, Nasal, BID  •  fexofenadine-pseudoephedrine, 1 tablet, Oral, BID  •  famotidine, 40 mg, Oral, DAILY  •  warfarin, 6 mg, Oral, DAILY  •  nitroglycerin, 0.4 mg, Sublingual, PRN  •  albuterol, 2 Puff, Inhalation, Q6HRS PRN  •  acetaminophen, 325 mg, Oral, Q6HRS PRN  •  furosemide, 20 mg, Oral, BID  •  potassium chloride SA, 20 mEq, Oral, DAILY  •  montelukast, 10 mg, Oral, QHS  •  atorvastatin, 20 mg, Oral, DAILY  •  Spiriva HandiHaler, 18 mcg, Inhalation, DAILY  •  aripiprazole, 30 mg, Oral, QAM    Allergies   Allergen Reactions   • Amoxicillin Rash and Itching     Tolerates cephalosporins, pt reports that she gets a rash all over her body and gets itchy   • Penicillin G Rash and Itching     pt reports that she gets a rash all over her body and gets itchy       Labs     Lab Results   Component Value Date/Time    HBA1C 6.1 (H) 06/17/2021 10:37 AM          Lab Results   Component Value Date/Time    CHOLSTRLTOT 198 05/10/2021 05:42 AM     (H) 05/10/2021 05:42 AM    HDL 48 05/10/2021 05:42 AM    TRIGLYCERIDE 126 05/10/2021 05:42 AM       Lab Results   Component Value Date/Time    SODIUM 141 07/26/2021 04:26 AM    POTASSIUM 4.4 07/26/2021 04:26 AM    CHLORIDE 101 07/26/2021 04:26 AM    CO2 34 (H) 07/26/2021 04:26 AM    GLUCOSE 160 (H) 07/26/2021 04:26 AM    BUN 30 (H) 07/26/2021 04:26 AM    CREATININE 0.92 07/26/2021 04:26 AM    CREATININE 0.9 09/11/2008 01:45 PM     Lab Results   Component Value Date/Time    ALKPHOSPHAT 105 (H) 07/22/2021 12:22 PM    ASTSGOT 17 07/22/2021 12:22 PM    ALTSGPT 14 07/22/2021 12:22 PM    TBILIRUBIN 0.4 07/22/2021 12:22 PM    INR 1.40 (A) 07/30/2021 11:42 AM    ALBUMIN 3.9 07/22/2021 12:22 PM      Lab  Results   Component Value Date/Time    WBC 6.3 07/26/2021 04:26 AM    RBC 4.11 (L) 07/26/2021 04:26 AM    HEMOGLOBIN 11.3 (L) 07/26/2021 04:26 AM    HEMATOCRIT 38.0 07/26/2021 04:26 AM    PLATELETCT 199 07/26/2021 04:26 AM      No results found for: MALBCRT, MICROALBUR  Immunization History   Administered Date(s) Administered   • Influenza Vaccine Quad Inj (Pf) 11/16/2018, 10/10/2019, 09/28/2020   • Pfizer SARS-CoV-2 Vaccine 04/22/2021, 05/24/2021   • Pneumococcal polysaccharide vaccine (PPSV-23) 11/20/2018   • TD Vaccine 05/28/1999   • Tdap Vaccine 01/31/2020       Assessment and Plan:   • Received referral from [facility]. Medications reviewed.         Shaw Read, PharmD, MS, BCACP, LCC  [facility]  Phone [phone] fax [phone]    This note was created using voice recognition software (Dragon). The accuracy of the dictation is limited by the abilities of the software. I have reviewed the note prior to signing, however some errors in grammar and context are still possible. If you have any questions related to this note please do not hesitate to contact our office.   \

## 2021-08-03 ENCOUNTER — HOME CARE VISIT (OUTPATIENT)
Dept: HOME HEALTH SERVICES | Facility: HOME HEALTHCARE | Age: 46
End: 2021-08-03
Payer: MEDICAID

## 2021-08-03 VITALS
TEMPERATURE: 97.6 F | DIASTOLIC BLOOD PRESSURE: 66 MMHG | OXYGEN SATURATION: 95 % | RESPIRATION RATE: 18 BRPM | HEART RATE: 76 BPM | SYSTOLIC BLOOD PRESSURE: 110 MMHG

## 2021-08-03 PROCEDURE — G0152 HHCP-SERV OF OT,EA 15 MIN: HCPCS

## 2021-08-03 PROCEDURE — G0151 HHCP-SERV OF PT,EA 15 MIN: HCPCS

## 2021-08-03 ASSESSMENT — BALANCE ASSESSMENTS
ATTEMPTS TO ARISE: 2 - ABLE TO RISE, ONE ATTEMPT
IMMEDIATE STANDING BALANCE FIRST 5 SECONDS: 1 - STEADY BUT USES WALKER OR OTHER SUPPORT
EYES CLOSED AT MAXIMUM POSITION NUDGED: 1 - STEADY
ARISING SCORE: 1
SITTING DOWN: 1 - USES ARMS OR NOT SMOOTH MOTION
NUDGED SCORE: 1
ARISES: 1 - ABLE, USES ARMS TO HELP
TURNING 360 DEGREES STEPS: 1 - CONTINUOUS STEPS
NUDGED: 1 - STAGGERS, GRABS, CATCHES SELF
BALANCE SCORE: 11
STANDING BALANCE: 1 - STEADY BUT WIDE STANCE AND USES CANE OR OTHER SUPPORT
SITTING BALANCE: 1 - STEADY, SAFE

## 2021-08-03 ASSESSMENT — ENCOUNTER SYMPTOMS
SEVERE DYSPNEA: 1
LOWEST PAIN SEVERITY IN PAST 24 HOURS: 0/10
MUSCLE WEAKNESS: 1
PERSON REPORTING PAIN: PATIENT
DENIES PAIN: 1
HIGHEST PAIN SEVERITY IN PAST 24 HOURS: 0/10
PAIN SEVERITY GOAL: 0/10

## 2021-08-03 ASSESSMENT — GAIT ASSESSMENTS
BALANCE AND GAIT SCORE: 19
STEP SYMMETRY: 1 - RIGHT AND LEFT STEP LENGTH APPEAR EQUAL
STEP CONTINUITY: 1 - STEPS APPEAR CONTINUOUS
GAIT SCORE: 8
INITIATION OF GAIT IMMEDIATELY AFTER GO: 1 - NO HESITANCY
TRUNK SCORE: 0
PATH: 1 - MILD/MODERATE DEVIATION OR USES WALKING AID
WALKING STANCE: 0 - HEELS APART
PATH SCORE: 1
TRUNK: 0 - MARKED SWAY OR USES WALKING AID

## 2021-08-03 ASSESSMENT — ACTIVITIES OF DAILY LIVING (ADL)
AMBULATION_DISTANCE/DURATION_TOLERATED: >300 FEET
AMBULATION ASSISTANCE ON FLAT SURFACES: 1

## 2021-08-03 NOTE — PROGRESS NOTES
Anticoagulation Summary  As of 8/2/2021    INR goal:  2.0-3.0   TTR:  21.1 % (3.4 mo)   INR used for dosing:  3.20 (8/2/2021)   Warfarin maintenance plan:  9 mg (6 mg x 1.5) every Sun, Fri, Sat; 6 mg (6 mg x 1) all other days   Weekly warfarin total:  51 mg   Plan last modified:  Shaw Read PharmD (7/30/2021)   Next INR check:  8/5/2021   Target end date:  Indefinite    Indications    Paroxysmal A-fib (HCC) [I48.0]             Anticoagulation Episode Summary     INR check location:      Preferred lab:      Send INR reminders to:      Comments:  [facility]           Refer to Anticoagulation Patient Findings for HPI  Patient Findings     Positives:  Bruising (Pt reports a bruise from last night. Reports it does not appear to be worsening and will continue to monitor.)    Negatives:  Signs/symptoms of thrombosis, Signs/symptoms of bleeding, Laboratory test error suspected, Change in health, Change in alcohol use, Change in activity, Upcoming invasive procedure, Emergency department visit, Upcoming dental procedure, Missed doses, Extra doses, Change in medications, Change in diet/appetite, Hospital admission, Other complaints          Spoke with pt via phone.  INR is SUPRA therapeutic at 3.2    Pt verifies warfarin weekly dosing.     Pt is NOT on antiplatelet therapy     Will have pt REDUCE DOSE 1x to 3mg then resume schedule dosing schedule with close follow up considering last INR was sub-therapeutic    Repeat INR in 3 days via [facility]      Carlos Barros PharmD

## 2021-08-04 ENCOUNTER — HOME CARE VISIT (OUTPATIENT)
Dept: HOME HEALTH SERVICES | Facility: HOME HEALTHCARE | Age: 46
End: 2021-08-04
Payer: MEDICAID

## 2021-08-04 ENCOUNTER — OFFICE VISIT (OUTPATIENT)
Dept: URGENT CARE | Facility: CLINIC | Age: 46
End: 2021-08-04
Payer: MEDICAID

## 2021-08-04 ENCOUNTER — APPOINTMENT (OUTPATIENT)
Dept: RADIOLOGY | Facility: IMAGING CENTER | Age: 46
End: 2021-08-04
Attending: PHYSICIAN ASSISTANT
Payer: MEDICAID

## 2021-08-04 VITALS
RESPIRATION RATE: 20 BRPM | TEMPERATURE: 97.3 F | SYSTOLIC BLOOD PRESSURE: 130 MMHG | BODY MASS INDEX: 52.23 KG/M2 | WEIGHT: 283.8 LBS | HEIGHT: 62 IN | DIASTOLIC BLOOD PRESSURE: 80 MMHG | OXYGEN SATURATION: 94 % | HEART RATE: 68 BPM

## 2021-08-04 DIAGNOSIS — Z79.01 CHRONIC ANTICOAGULATION: ICD-10-CM

## 2021-08-04 DIAGNOSIS — M79.641 RIGHT HAND PAIN: ICD-10-CM

## 2021-08-04 DIAGNOSIS — T14.8XXA HEMATOMA: ICD-10-CM

## 2021-08-04 PROBLEM — L60.9 DISORDER OF NAIL: Status: ACTIVE | Noted: 2021-08-04

## 2021-08-04 PROCEDURE — 73130 X-RAY EXAM OF HAND: CPT | Mod: TC,RT | Performed by: PHYSICIAN ASSISTANT

## 2021-08-04 PROCEDURE — 99214 OFFICE O/P EST MOD 30 MIN: CPT | Performed by: PHYSICIAN ASSISTANT

## 2021-08-04 RX ORDER — ACETAMINOPHEN 325 MG/1
TABLET ORAL
COMMUNITY
Start: 2021-07-30 | End: 2021-08-07

## 2021-08-04 RX ORDER — DICLOFENAC SODIUM 30 MG/G
1 GEL TOPICAL 3 TIMES DAILY PRN
Qty: 100 G | Refills: 0 | Status: SHIPPED | OUTPATIENT
Start: 2021-08-04 | End: 2021-10-06

## 2021-08-04 ASSESSMENT — ENCOUNTER SYMPTOMS
PAIN LOCATION - PAIN QUALITY: SHARP
PAIN LOCATION - PAIN FREQUENCY: INTERMITTENT
PAIN SEVERITY GOAL: 0/10
HIGHEST PAIN SEVERITY IN PAST 24 HOURS: 5/10
SUBJECTIVE PAIN PROGRESSION: WAXING AND WANING
LOWEST PAIN SEVERITY IN PAST 24 HOURS: 5/10
PERSON REPORTING PAIN: PATIENT
DIFFICULTY THINKING: 1
PAIN: 1
SEVERE DYSPNEA: 1
PAIN LOCATION: ABDOMEN
PAIN LOCATION - PAIN SEVERITY: 5/10
DEPRESSED MOOD: 1
POOR JUDGMENT: 1
CHILLS: 0
FEVER: 0
FALLS: 0

## 2021-08-04 ASSESSMENT — FIBROSIS 4 INDEX: FIB4 SCORE: 1.07

## 2021-08-04 NOTE — PATIENT INSTRUCTIONS
1. Warm compresses 3-4x/day  2. Topical medication as needed 3x/day  3. Follow up with PCP on Friday or Monday.      Hematoma  A hematoma is a collection of blood under the skin, in an organ, in a body space, in a joint space, or in other tissue. The blood can thicken (clot) to form a lump that you can see and feel. The lump is often firm and may become sore and tender. Most hematomas get better in a few days to weeks. However, some hematomas may be serious and require medical care. Hematomas can range from very small to very large.  What are the causes?  This condition is caused by:  · A blunt or penetrating injury.  · A leakage from a blood vessel under the skin.  · Some medical procedures, including surgeries, such as oral surgery, face lifts, and surgeries on the joints.  · Some medical conditions that cause bleeding or bruising. There may be multiple hematomas that appear in different areas of the body.  What increases the risk?  You are more likely to develop this condition if:  · You are an older adult.  · You use blood thinners.  What are the signs or symptoms?    Symptoms of this condition depend on where the hematoma is located.   Common symptoms of a hematoma that is under the skin include:  · A firm lump on the body.  · Pain and tenderness in the area.  · Bruising. Blue, dark blue, purple-red, or yellowish skin (discoloration) may appear at the site of the hematoma if the hematoma is close to the surface of the skin.  Common symptoms of a hematoma that is deep in the tissues or body spaces may be less obvious. They include:  · A collection of blood in the stomach (intra-abdominal hematoma). This may cause pain in the abdomen, weakness, fainting, and shortness of breath.  · A collection of blood in the head (intracranial hematoma). This may cause a headache or symptoms such as weakness, trouble speaking or understanding, or a change in consciousness.   How is this diagnosed?  This condition is diagnosed  based on:  · Your medical history.  · A physical exam.  · Imaging tests, such as an ultrasound or CT scan. These may be needed if your health care provider suspects a hematoma in deeper tissues or body spaces.  · Blood tests. These may be needed if your health care provider believes that the hematoma is caused by a medical condition.  How is this treated?  Treatment for this condition depends on the cause, size, and location of the hematoma. Treatment may include:  · Doing nothing. The majority of hematomas do not need treatment as many of them go away on their own over time.  · Surgery or close monitoring. This may be needed for large hematomas or hematomas that affect vital organs.  · Medicines. Medicines may be given if there is an underlying medical cause for the hematoma.  Follow these instructions at home:  Managing pain, stiffness, and swelling    · If directed, put ice on the affected area.  ? Put ice in a plastic bag.  ? Place a towel between your skin and the bag.  ? Leave the ice on for 20 minutes, 2-3 times a day for the first couple of days.  · If directed, apply heat to the affected area after applying ice for a couple of days. Use the heat source that your health care provider recommends, such as a moist heat pack or a heating pad.  ? Place a towel between your skin and the heat source.  ? Leave the heat on for 20-30 minutes.  ? Remove the heat if your skin turns bright red. This is especially important if you are unable to feel pain, heat, or cold. You may have a greater risk of getting burned.  · Raise (elevate) the affected area above the level of your heart while you are sitting or lying down.  · If told, wrap the affected area with an elastic bandage. The bandage applies pressure (compression) to the area, which may help to reduce swelling and promote healing. Do not wrap the bandage too tightly around the affected area.  · If your hematoma is on a leg or foot (lower extremity) and is painful,  your health care provider may recommend crutches. Use them as told by your health care provider.  General instructions  · Take over-the-counter and prescription medicines only as told by your health care provider.  · Keep all follow-up visits as told by your health care provider. This is important.  Contact a health care provider if:  · You have a fever.  · The swelling or discoloration gets worse.  · You develop more hematomas.  Get help right away if:  · Your pain is worse or your pain is not controlled with medicine.  · Your skin over the hematoma breaks or starts bleeding.  · Your hematoma is in your chest or abdomen and you have weakness, shortness of breath, or a change in consciousness.  · You have a hematoma on your scalp that is caused by a fall or injury, and you also have:  ? A headache that gets worse.  ? Trouble speaking or understanding speech.  ? Weakness.  ? Change in alertness or consciousness.  Summary  · A hematoma is a collection of blood under the skin, in an organ, in a body space, in a joint space, or in other tissue.  · This condition usually does not need treatment because many hematomas go away on their own over time.  · Large hematomas, or those that may affect vital organs, may need surgical drainage or monitoring. If the hematoma is caused by a medical condition, medicines may be prescribed.  · Get help right away if your hematoma breaks or starts to bleed, you have shortness of breath, or you have a headache or trouble speaking after a fall.  This information is not intended to replace advice given to you by your health care provider. Make sure you discuss any questions you have with your health care provider.  Document Released: 08/01/2005 Document Revised: 05/23/2019 Document Reviewed: 05/23/2019  Elsevier Patient Education © 2020 [employer]

## 2021-08-04 NOTE — CASE COMMUNICATION
Quality Review for 7.30.21 GUANAKITO OASIS performed on by TAI Hall RN on 8.2.21, 2021:    Edits completed by TAI Hall RN:  1. Added 02 template to MAR  2. Labeled chart for COPD/REMSA  3. Changed  to 7.30.21 per [facility]OC order  4. Changed  to 7.30.21  5. Changed  to 3 per Epic  6. Changed  3 to yes per dxs  7. Changed  to 3 per narrative pt has SOB with min exertion  8. Added pt care preference from recert info  9. Changed [id number] A-E to 3 per narrative pt needs min assist with transfers  10. Removed F2F data

## 2021-08-05 ENCOUNTER — HOME CARE VISIT (OUTPATIENT)
Dept: HOME HEALTH SERVICES | Facility: HOME HEALTHCARE | Age: 46
End: 2021-08-05
Payer: MEDICAID

## 2021-08-05 VITALS
OXYGEN SATURATION: 98 % | HEART RATE: 71 BPM | SYSTOLIC BLOOD PRESSURE: 106 MMHG | DIASTOLIC BLOOD PRESSURE: 6 MMHG | RESPIRATION RATE: 18 BRPM | TEMPERATURE: 97.7 F

## 2021-08-05 LAB
INR PPP: 3.3 (ref 2–3.5)
INR PPP: 3.3 - CRITICAL LOW: < 0.8, CRITICAL HIGH: > 6.5 (ref 2–3.5)

## 2021-08-05 PROCEDURE — G0299 HHS/HOSPICE OF RN EA 15 MIN: HCPCS

## 2021-08-05 ASSESSMENT — ENCOUNTER SYMPTOMS
DIFFICULTY THINKING: 1
LOWEST PAIN SEVERITY IN PAST 24 HOURS: 5/10
PAIN LOCATION - RELIEVING FACTORS: REST
MUSCLE WEAKNESS: 1
PAIN LOCATION - PAIN QUALITY: ACHY
PAIN SEVERITY GOAL: 3/10
NAUSEA: DENIES
SUBJECTIVE PAIN PROGRESSION: UNCHANGED
PAIN LOCATION - PAIN FREQUENCY: WITH ACTIVITY
VOMITING: DENIES
PERSON REPORTING PAIN: PATIENT
PAIN LOCATION - PAIN SEVERITY: 5/10
PAIN LOCATION: LEFT HIP
SHORTNESS OF BREATH: T
PAIN: 1
HIGHEST PAIN SEVERITY IN PAST 24 HOURS: 5/10
PAIN LOCATION - EXACERBATING FACTORS: WALKING, STANDING
POOR JUDGMENT: 1

## 2021-08-05 ASSESSMENT — ACTIVITIES OF DAILY LIVING (ADL)
CURRENT_FUNCTION: STAND BY ASSIST
AMBULATION ASSISTANCE: STAND BY ASSIST

## 2021-08-05 NOTE — CASE COMMUNICATION
I agree with these changes  ----- Message -----  From: Aisha Hall R.N.  Sent: 8/4/2021   7:31 AM PDT  To: Zuleyma Flores R.N.      Quality Review for 7.30.21 GUANAKITO OASIS performed on by TAI Hall RN on 8.2.21, 2021:    Edits completed by TAI Hall RN:  1. Added 02 template to MAR  2. Labeled chart for COPD/REMSA  3. Changed  to 7.30.21 per LSOC order  4. Changed  to 7.30.21  5. Changed  to 3 per Epic  6. Changed  3 to yes per dxs  7. Changed  to 3 per narrative pt has SOB with min exertion  8. Added pt care preference from recert info  9. Changed [id number] A-E to 3 per narrative pt needs min assist with transfers  10. Removed F2F data

## 2021-08-05 NOTE — CASE COMMUNICATION
I agree with these changes  ----- Message -----  From: Aisha Hall R.N.  Sent: 8/4/2021   7:31 AM PDT  To: Zuleyma Flores R.N.      Quality Review for 7.30.21 GUANAKITO OASIS performed on by TAI Hall RN on 8.2.21, 2021:    Edits completed by TAI Hall RN:  1. Added 02 template to MAR  2. Labeled chart for COPD/REMSA  3. Changed  to 7.30.21 per [facility]OC order  4. Changed  to 7.30.21  5. Changed  to 3 per Epic  6. Changed  3 to yes per dxs  7. Changed  to 3 per narrative pt has SOB with min exertion  8. Added pt care preference from recert info  9. Changed [id number] A-E to 3 per narrative pt needs min assist with transfers  10. Removed F2F data

## 2021-08-06 ENCOUNTER — TELEPHONE (OUTPATIENT)
Dept: CARDIOLOGY | Facility: MEDICAL CENTER | Age: 46
End: 2021-08-06

## 2021-08-06 ENCOUNTER — ANTICOAGULATION MONITORING (OUTPATIENT)
Dept: VASCULAR LAB | Facility: MEDICAL CENTER | Age: 46
End: 2021-08-06

## 2021-08-06 DIAGNOSIS — I48.0 PAROXYSMAL A-FIB (HCC): ICD-10-CM

## 2021-08-06 DIAGNOSIS — Z79.01 CHRONIC ANTICOAGULATION: ICD-10-CM

## 2021-08-06 DIAGNOSIS — E78.5 HYPERLIPIDEMIA, UNSPECIFIED HYPERLIPIDEMIA TYPE: ICD-10-CM

## 2021-08-06 RX ORDER — FUROSEMIDE 20 MG/1
TABLET ORAL
Qty: 180 TABLET | Refills: 2 | Status: SHIPPED | OUTPATIENT
Start: 2021-08-06 | End: 2021-08-07

## 2021-08-06 NOTE — PROGRESS NOTES
Anticoagulation Summary  As of 8/6/2021    INR goal:  2.0-3.0   TTR:  20.5 % (3.5 mo)   INR used for dosing:  3.30 (8/5/2021)   Warfarin maintenance plan:  9 mg (6 mg x 1.5) every Mon, Thu; 6 mg (6 mg x 1) all other days   Weekly warfarin total:  48 mg   Plan last modified:  Ceci Coe (8/6/2021)   Next INR check:     Target end date:  Indefinite    Indications    Paroxysmal A-fib (HCC) [I48.0]             Anticoagulation Episode Summary     INR check location:      Preferred lab:      Send INR reminders to:      Comments:  [facility]        Anticoagulation Patient Findings          Spoke with Lorene to report a supra therapeutic INR of 3.3.  Will decrease weekly dose by 6%. Follow up in 3 DAYS  to reduce the risk of adverse events related to this high risk medication, warfarin.    Ceci Coe, Clinical Pharmacist    [facility] TO RETEST INR 8/9/2021

## 2021-08-06 NOTE — TELEPHONE ENCOUNTER
SHUN for PT to schedule.   SLC    ----- Message from Abdirashid Guevara Ass't sent at 8/6/2021  9:41 AM PDT -----  Regarding: Follow-up  Patient is due for a follow up with JA or VR, please contact patient to schedule an appointment for further refills of their medications.    Thank you,  Carolann Mendenhall Ass't

## 2021-08-06 NOTE — ASSESSMENT & PLAN NOTE
Procal neg.   Mild, Likely reactive, sec to steroid use  Cont to monitor   Pt's mother called. GRICELDA needs signed. SW will need  to communicate with pt's mother.

## 2021-08-07 ENCOUNTER — HOSPITAL ENCOUNTER (EMERGENCY)
Facility: MEDICAL CENTER | Age: 46
End: 2021-08-07
Attending: EMERGENCY MEDICINE
Payer: MEDICAID

## 2021-08-07 ENCOUNTER — APPOINTMENT (OUTPATIENT)
Dept: RADIOLOGY | Facility: MEDICAL CENTER | Age: 46
End: 2021-08-07
Attending: EMERGENCY MEDICINE
Payer: MEDICAID

## 2021-08-07 VITALS
RESPIRATION RATE: 19 BRPM | WEIGHT: 285 LBS | TEMPERATURE: 97.1 F | BODY MASS INDEX: 52.44 KG/M2 | HEIGHT: 62 IN | SYSTOLIC BLOOD PRESSURE: 152 MMHG | HEART RATE: 85 BPM | OXYGEN SATURATION: 98 % | DIASTOLIC BLOOD PRESSURE: 65 MMHG

## 2021-08-07 DIAGNOSIS — J44.1 ACUTE EXACERBATION OF CHRONIC OBSTRUCTIVE PULMONARY DISEASE (COPD) (HCC): ICD-10-CM

## 2021-08-07 DIAGNOSIS — L03.119 CELLULITIS OF LOWER EXTREMITY, UNSPECIFIED LATERALITY: ICD-10-CM

## 2021-08-07 DIAGNOSIS — R05.9 COUGH: ICD-10-CM

## 2021-08-07 LAB
ALBUMIN SERPL BCP-MCNC: 3.4 G/DL (ref 3.2–4.9)
ALBUMIN/GLOB SERPL: 1.2 G/DL
ALP SERPL-CCNC: 93 U/L (ref 30–99)
ALT SERPL-CCNC: 17 U/L (ref 2–50)
ANION GAP SERPL CALC-SCNC: 10 MMOL/L (ref 7–16)
APTT PPP: 52.8 SEC (ref 24.7–36)
AST SERPL-CCNC: 17 U/L (ref 12–45)
BASOPHILS # BLD AUTO: 0.2 % (ref 0–1.8)
BASOPHILS # BLD: 0.02 K/UL (ref 0–0.12)
BILIRUB SERPL-MCNC: 0.6 MG/DL (ref 0.1–1.5)
BUN SERPL-MCNC: 10 MG/DL (ref 8–22)
CALCIUM SERPL-MCNC: 8.8 MG/DL (ref 8.5–10.5)
CHLORIDE SERPL-SCNC: 103 MMOL/L (ref 96–112)
CO2 SERPL-SCNC: 27 MMOL/L (ref 20–33)
CREAT SERPL-MCNC: 0.68 MG/DL (ref 0.5–1.4)
EKG IMPRESSION: NORMAL
EOSINOPHIL # BLD AUTO: 0.12 K/UL (ref 0–0.51)
EOSINOPHIL NFR BLD: 1.1 % (ref 0–6.9)
ERYTHROCYTE [DISTWIDTH] IN BLOOD BY AUTOMATED COUNT: 55 FL (ref 35.9–50)
GLOBULIN SER CALC-MCNC: 2.8 G/DL (ref 1.9–3.5)
GLUCOSE SERPL-MCNC: 112 MG/DL (ref 65–99)
HCT VFR BLD AUTO: 40.4 % (ref 37–47)
HGB BLD-MCNC: 12.2 G/DL (ref 12–16)
IMM GRANULOCYTES # BLD AUTO: 0.04 K/UL (ref 0–0.11)
IMM GRANULOCYTES NFR BLD AUTO: 0.4 % (ref 0–0.9)
INR PPP: 2.87 (ref 0.87–1.13)
LACTATE BLD-SCNC: 1.3 MMOL/L (ref 0.5–2)
LYMPHOCYTES # BLD AUTO: 1.41 K/UL (ref 1–4.8)
LYMPHOCYTES NFR BLD: 12.8 % (ref 22–41)
MCH RBC QN AUTO: 28.2 PG (ref 27–33)
MCHC RBC AUTO-ENTMCNC: 30.2 G/DL (ref 33.6–35)
MCV RBC AUTO: 93.3 FL (ref 81.4–97.8)
MONOCYTES # BLD AUTO: 0.85 K/UL (ref 0–0.85)
MONOCYTES NFR BLD AUTO: 7.7 % (ref 0–13.4)
NEUTROPHILS # BLD AUTO: 8.56 K/UL (ref 2–7.15)
NEUTROPHILS NFR BLD: 77.8 % (ref 44–72)
NRBC # BLD AUTO: 0 K/UL
NRBC BLD-RTO: 0 /100 WBC
NT-PROBNP SERPL IA-MCNC: 239 PG/ML (ref 0–125)
PLATELET # BLD AUTO: 233 K/UL (ref 164–446)
PMV BLD AUTO: 11.1 FL (ref 9–12.9)
POTASSIUM SERPL-SCNC: 5 MMOL/L (ref 3.6–5.5)
PROT SERPL-MCNC: 6.2 G/DL (ref 6–8.2)
PROTHROMBIN TIME: 29.2 SEC (ref 12–14.6)
RBC # BLD AUTO: 4.33 M/UL (ref 4.2–5.4)
SODIUM SERPL-SCNC: 140 MMOL/L (ref 135–145)
TROPONIN T SERPL-MCNC: 28 NG/L (ref 6–19)
WBC # BLD AUTO: 11 K/UL (ref 4.8–10.8)

## 2021-08-07 PROCEDURE — 80053 COMPREHEN METABOLIC PANEL: CPT

## 2021-08-07 PROCEDURE — 87040 BLOOD CULTURE FOR BACTERIA: CPT

## 2021-08-07 PROCEDURE — 93970 EXTREMITY STUDY: CPT

## 2021-08-07 PROCEDURE — 83880 ASSAY OF NATRIURETIC PEPTIDE: CPT

## 2021-08-07 PROCEDURE — 93005 ELECTROCARDIOGRAM TRACING: CPT | Performed by: EMERGENCY MEDICINE

## 2021-08-07 PROCEDURE — 700101 HCHG RX REV CODE 250: Performed by: EMERGENCY MEDICINE

## 2021-08-07 PROCEDURE — 85025 COMPLETE CBC W/AUTO DIFF WBC: CPT

## 2021-08-07 PROCEDURE — U0005 INFEC AGEN DETEC AMPLI PROBE: HCPCS

## 2021-08-07 PROCEDURE — 85610 PROTHROMBIN TIME: CPT

## 2021-08-07 PROCEDURE — 99285 EMERGENCY DEPT VISIT HI MDM: CPT

## 2021-08-07 PROCEDURE — 84484 ASSAY OF TROPONIN QUANT: CPT

## 2021-08-07 PROCEDURE — 71045 X-RAY EXAM CHEST 1 VIEW: CPT

## 2021-08-07 PROCEDURE — 83605 ASSAY OF LACTIC ACID: CPT

## 2021-08-07 PROCEDURE — 85730 THROMBOPLASTIN TIME PARTIAL: CPT

## 2021-08-07 PROCEDURE — A9270 NON-COVERED ITEM OR SERVICE: HCPCS | Performed by: EMERGENCY MEDICINE

## 2021-08-07 PROCEDURE — 700111 HCHG RX REV CODE 636 W/ 250 OVERRIDE (IP): Performed by: EMERGENCY MEDICINE

## 2021-08-07 PROCEDURE — 96374 THER/PROPH/DIAG INJ IV PUSH: CPT

## 2021-08-07 PROCEDURE — 700102 HCHG RX REV CODE 250 W/ 637 OVERRIDE(OP): Performed by: EMERGENCY MEDICINE

## 2021-08-07 PROCEDURE — 93005 ELECTROCARDIOGRAM TRACING: CPT

## 2021-08-07 PROCEDURE — 94640 AIRWAY INHALATION TREATMENT: CPT

## 2021-08-07 PROCEDURE — U0003 INFECTIOUS AGENT DETECTION BY NUCLEIC ACID (DNA OR RNA); SEVERE ACUTE RESPIRATORY SYNDROME CORONAVIRUS 2 (SARS-COV-2) (CORONAVIRUS DISEASE [COVID-19]), AMPLIFIED PROBE TECHNIQUE, MAKING USE OF HIGH THROUGHPUT TECHNOLOGIES AS DESCRIBED BY CMS-2020-01-R: HCPCS

## 2021-08-07 RX ORDER — IPRATROPIUM BROMIDE AND ALBUTEROL SULFATE 2.5; .5 MG/3ML; MG/3ML
3 SOLUTION RESPIRATORY (INHALATION) ONCE
Status: COMPLETED | OUTPATIENT
Start: 2021-08-07 | End: 2021-08-07

## 2021-08-07 RX ORDER — PREDNISONE 20 MG/1
40 TABLET ORAL DAILY
Qty: 6 TABLET | Refills: 0 | Status: SHIPPED | OUTPATIENT
Start: 2021-08-08 | End: 2021-08-11

## 2021-08-07 RX ORDER — METHYLPREDNISOLONE SODIUM SUCCINATE 125 MG/2ML
125 INJECTION, POWDER, LYOPHILIZED, FOR SOLUTION INTRAMUSCULAR; INTRAVENOUS ONCE
Status: COMPLETED | OUTPATIENT
Start: 2021-08-07 | End: 2021-08-07

## 2021-08-07 RX ORDER — FUROSEMIDE 20 MG/1
20 TABLET ORAL 2 TIMES DAILY
Status: ON HOLD | COMMUNITY
End: 2021-10-13

## 2021-08-07 RX ORDER — CEPHALEXIN 500 MG/1
500 CAPSULE ORAL ONCE
Status: COMPLETED | OUTPATIENT
Start: 2021-08-07 | End: 2021-08-07

## 2021-08-07 RX ORDER — CEPHALEXIN 500 MG/1
500 CAPSULE ORAL 4 TIMES DAILY
Qty: 28 CAPSULE | Refills: 0 | Status: SHIPPED | OUTPATIENT
Start: 2021-08-07 | End: 2021-08-14

## 2021-08-07 RX ADMIN — IPRATROPIUM BROMIDE AND ALBUTEROL SULFATE 3 ML: .5; 2.5 SOLUTION RESPIRATORY (INHALATION) at 15:42

## 2021-08-07 RX ADMIN — METHYLPREDNISOLONE SODIUM SUCCINATE 125 MG: 125 INJECTION, POWDER, FOR SOLUTION INTRAMUSCULAR; INTRAVENOUS at 13:30

## 2021-08-07 RX ADMIN — CEPHALEXIN 500 MG: 500 CAPSULE ORAL at 17:40

## 2021-08-07 ASSESSMENT — LIFESTYLE VARIABLES
EVER HAD A DRINK FIRST THING IN THE MORNING TO STEADY YOUR NERVES TO GET RID OF A HANGOVER: NO
DOES PATIENT WANT TO STOP DRINKING: NO
HAVE PEOPLE ANNOYED YOU BY CRITICIZING YOUR DRINKING: NO
HAVE YOU EVER FELT YOU SHOULD CUT DOWN ON YOUR DRINKING: NO
TOTAL SCORE: 0
CONSUMPTION TOTAL: INCOMPLETE
EVER FELT BAD OR GUILTY ABOUT YOUR DRINKING: NO
TOTAL SCORE: 0
DO YOU DRINK ALCOHOL: NO
TOTAL SCORE: 0

## 2021-08-07 ASSESSMENT — FIBROSIS 4 INDEX: FIB4 SCORE: 1.07

## 2021-08-07 NOTE — DISCHARGE INSTRUCTIONS
Follow-up with your primary care physician on Monday.    Return to the ER for any worsening cough, shortness of breath, coughing of phlegm or blood, fevers over 100.4, worsening redness to your legs, worsening swelling to your legs, vomiting, lethargy, behavioral changes, chest pain, or for any concerns.

## 2021-08-07 NOTE — ED TRIAGE NOTES
"Here from home via [facility] for shortness of breath with noted \"brown tinge sputum\" via home health care. Always on 2.0 liter NC at home for COPD, CHF, afib on monitor, + thinners. + vaccinated against covid. No Gi complaints no  complaints. Bed bound per normal. Here last time smoke in air was bad.   "

## 2021-08-07 NOTE — ED NOTES
Med Rec completed: per Pt at bedside      No ORAL antibiotics in last 30 days    Preferred [facility]: [facility]: [phone]    Pt confirmed following allergies:  Allergies   Allergen Reactions   • Amoxicillin Rash and Itching     Tolerates cephalosporins, pt reports that she gets a rash all over her body and gets itchy   • Penicillin G Rash and Itching     pt reports that she gets a rash all over her body and gets itchy        Pt's home medications:   No current facility-administered medications on file prior to encounter.     Medication Sig   • furosemide (LASIX) 20 MG Tab Take 20 mg by mouth 2 times a day.   • diclofenac sodium 3 % Gel Apply 1 g topically 3 times a day as needed.   • Home Care Oxygen Inhale 2 L/min continuous. Oxygen is at 2L/NC continuously  Oxygen dose range: 2 L/min  Respiratory route via: Nasal Cannula   Oxygen supplier:[facility]       • fluticasone (FLONASE) 50 MCG/ACT nasal spray Administer 1-2 Sprays into affected nostril(S) 2 times a day.   • famotidine (PEPCID) 40 MG Tab Take 40 mg by mouth every day. Indications: Heartburn   • warfarin (COUMADIN) 6 MG Tab Take 6-9 mg by mouth every day. Warfarin maintenance plan:  9 mg (6 mg x 1.5) every Mon, Thu; 6 mg (6 mg x 1) all other days   • nitroglycerin (NITROSTAT) 0.4 MG SL Tab Place 1 tablet under the tongue as needed for Chest Pain.   • albuterol 108 (90 Base) MCG/ACT Aero Soln inhalation aerosol Inhale 2 Puffs every 6 hours as needed for Shortness of Breath.   • acetaminophen (TYLENOL) 500 MG Tab Take 500 mg by mouth every 6 hours as needed (pain).   • potassium chloride SA (KDUR) 20 MEQ Tab CR Take 1 tablet by mouth every day.   • montelukast (SINGULAIR) 10 MG Tab Take 1 Tab by mouth every bedtime.   • atorvastatin (LIPITOR) 20 MG Tab Take 1 Tab by mouth every day.   • tiotropium (SPIRIVA HANDIHALER) 18 MCG Cap Inhale 1 Cap by mouth every day.   • aripiprazole (ABILIFY) 30 MG tablet Take 30 mg by mouth every morning.        Removed medications:   Medication Removal Reason   • [DISCONTINUED] furosemide (LASIX) 20 MG Tab Reconciled dosage info    • [DISCONTINUED] fexofenadine-pseudoephedrine (ALLEGRA-D)  MG per tablet Pt reports not taking     • [DISCONTINUED] acetaminophen (TYLENOL) 325 MG Tab Duplicate
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
## 2021-08-08 LAB
SARS-COV-2 RNA RESP QL NAA+PROBE: NOTDETECTED
SPECIMEN SOURCE: NORMAL

## 2021-08-08 NOTE — ED NOTES
Discharge instructions and follow up care discussed with patient. Patient given time to ask questions and verbalized understanding. PIV removed. Patient given 2 new prescriptions. Patient AOx4 at discharge and ambulatory to lobby with steady gait using FWW with all belongings and personal concentrator. Pt's mother will pick her up.

## 2021-08-09 ENCOUNTER — HOME CARE VISIT (OUTPATIENT)
Dept: HOME HEALTH SERVICES | Facility: HOME HEALTHCARE | Age: 46
End: 2021-08-09
Payer: MEDICAID

## 2021-08-09 ENCOUNTER — ANTICOAGULATION MONITORING (OUTPATIENT)
Dept: MEDICAL GROUP | Facility: PHYSICIAN GROUP | Age: 46
End: 2021-08-09

## 2021-08-09 VITALS
DIASTOLIC BLOOD PRESSURE: 62 MMHG | TEMPERATURE: 97.9 F | HEART RATE: 74 BPM | SYSTOLIC BLOOD PRESSURE: 90 MMHG | OXYGEN SATURATION: 16 % | RESPIRATION RATE: 18 BRPM

## 2021-08-09 DIAGNOSIS — I48.0 PAROXYSMAL A-FIB (HCC): ICD-10-CM

## 2021-08-09 LAB
INR PPP: 3.2 (ref 2–3.5)
INR PPP: 3.2 - CRITICAL LOW: < 0.8, CRITICAL HIGH: > 6.5 (ref 2–3.5)

## 2021-08-09 PROCEDURE — G0152 HHCP-SERV OF OT,EA 15 MIN: HCPCS

## 2021-08-09 PROCEDURE — G0299 HHS/HOSPICE OF RN EA 15 MIN: HCPCS

## 2021-08-09 ASSESSMENT — ENCOUNTER SYMPTOMS
DENIES PAIN: 1
PAIN LOCATION - RELIEVING FACTORS: REST, MEDS
NAUSEA: DENIES
PERSON REPORTING PAIN: PATIENT
PAIN LOCATION - PAIN QUALITY: STABBING
PAIN LOCATION: LEFT HIP
PAIN LOCATION - PAIN SEVERITY: 5/10
LOWEST PAIN SEVERITY IN PAST 24 HOURS: 5/10
PAIN: 1
PAIN SEVERITY GOAL: 2/10
PAIN LOCATION - PAIN FREQUENCY: INTERMITTENT
SUBJECTIVE PAIN PROGRESSION: GRADUALLY IMPROVING
VOMITING: DENIES
HIGHEST PAIN SEVERITY IN PAST 24 HOURS: 5/10

## 2021-08-09 NOTE — PROGRESS NOTES
Anticoagulation Summary  As of 8/9/2021    INR goal:  2.0-3.0   TTR:  21.0 % (3.6 mo)   INR used for dosing:  3.20 (8/9/2021)   Warfarin maintenance plan:  3 mg (6 mg x 0.5) every Mon; 6 mg (6 mg x 1) all other days   Weekly warfarin total:  39 mg   Plan last modified:  Ciro Mohamud, PharmD (8/9/2021)   Next INR check:  8/12/2021   Target end date:  Indefinite    Indications    Paroxysmal A-fib (HCC) [I48.0]             Anticoagulation Episode Summary     INR check location:      Preferred lab:      Send INR reminders to:      Comments:  [facility]        Anticoagulation Patient Findings          HPI:  Lorene Haro, on anticoagulation therapy with warfarin for Afib.   Changes to current medical/health status since last appt: none  Denies signs/symptoms of bleeding and/or thrombosis since the last appt.    Denies any interval changes to diet  Denies any interval changes to medications since last appt.   Denies any complications or cost restrictions with current therapy.     A/P   INR  SUPRA-therapeutic.   Begin reduced regimen based on last 7 days and TWD.     Next INR in 3 days.     Ciro Mohamud, PharmD

## 2021-08-10 ENCOUNTER — HOME CARE VISIT (OUTPATIENT)
Dept: HOME HEALTH SERVICES | Facility: HOME HEALTHCARE | Age: 46
End: 2021-08-10
Payer: MEDICAID

## 2021-08-10 ENCOUNTER — HOSPITAL ENCOUNTER (EMERGENCY)
Facility: MEDICAL CENTER | Age: 46
End: 2021-08-10
Attending: EMERGENCY MEDICINE
Payer: MEDICAID

## 2021-08-10 VITALS
RESPIRATION RATE: 18 BRPM | HEART RATE: 84 BPM | SYSTOLIC BLOOD PRESSURE: 98 MMHG | OXYGEN SATURATION: 97 % | DIASTOLIC BLOOD PRESSURE: 65 MMHG | TEMPERATURE: 97.4 F

## 2021-08-10 VITALS
WEIGHT: 288.36 LBS | SYSTOLIC BLOOD PRESSURE: 115 MMHG | OXYGEN SATURATION: 98 % | TEMPERATURE: 98 F | HEART RATE: 71 BPM | RESPIRATION RATE: 16 BRPM | DIASTOLIC BLOOD PRESSURE: 61 MMHG | BODY MASS INDEX: 52.74 KG/M2

## 2021-08-10 DIAGNOSIS — R73.9 HYPERGLYCEMIA: ICD-10-CM

## 2021-08-10 DIAGNOSIS — R10.9 FLANK PAIN: ICD-10-CM

## 2021-08-10 LAB
ALBUMIN SERPL BCP-MCNC: 4.1 G/DL (ref 3.2–4.9)
ALBUMIN/GLOB SERPL: 1.4 G/DL
ALP SERPL-CCNC: 111 U/L (ref 30–99)
ALT SERPL-CCNC: 21 U/L (ref 2–50)
ANION GAP SERPL CALC-SCNC: 12 MMOL/L (ref 7–16)
ANISOCYTOSIS BLD QL SMEAR: ABNORMAL
APPEARANCE UR: CLEAR
AST SERPL-CCNC: 12 U/L (ref 12–45)
BASOPHILS # BLD AUTO: 0.1 % (ref 0–1.8)
BASOPHILS # BLD: 0.01 K/UL (ref 0–0.12)
BILIRUB SERPL-MCNC: 0.4 MG/DL (ref 0.1–1.5)
BILIRUB UR QL STRIP.AUTO: NEGATIVE
BUN SERPL-MCNC: 15 MG/DL (ref 8–22)
CALCIUM SERPL-MCNC: 9.3 MG/DL (ref 8.5–10.5)
CHLORIDE SERPL-SCNC: 102 MMOL/L (ref 96–112)
CO2 SERPL-SCNC: 28 MMOL/L (ref 20–33)
COLOR UR: YELLOW
COMMENT 1642: NORMAL
CREAT SERPL-MCNC: 0.92 MG/DL (ref 0.5–1.4)
DACRYOCYTES BLD QL SMEAR: NORMAL
EOSINOPHIL # BLD AUTO: 0 K/UL (ref 0–0.51)
EOSINOPHIL NFR BLD: 0 % (ref 0–6.9)
ERYTHROCYTE [DISTWIDTH] IN BLOOD BY AUTOMATED COUNT: 53.9 FL (ref 35.9–50)
GLOBULIN SER CALC-MCNC: 3 G/DL (ref 1.9–3.5)
GLUCOSE SERPL-MCNC: 268 MG/DL (ref 65–99)
GLUCOSE UR STRIP.AUTO-MCNC: >=1000 MG/DL
HCT VFR BLD AUTO: 42.9 % (ref 37–47)
HGB BLD-MCNC: 12.6 G/DL (ref 12–16)
IMM GRANULOCYTES # BLD AUTO: 0.05 K/UL (ref 0–0.11)
IMM GRANULOCYTES NFR BLD AUTO: 0.6 % (ref 0–0.9)
KETONES UR STRIP.AUTO-MCNC: NEGATIVE MG/DL
LEUKOCYTE ESTERASE UR QL STRIP.AUTO: NEGATIVE
LIPASE SERPL-CCNC: 28 U/L (ref 11–82)
LYMPHOCYTES # BLD AUTO: 0.56 K/UL (ref 1–4.8)
LYMPHOCYTES NFR BLD: 6.4 % (ref 22–41)
MCH RBC QN AUTO: 27.2 PG (ref 27–33)
MCHC RBC AUTO-ENTMCNC: 29.4 G/DL (ref 33.6–35)
MCV RBC AUTO: 92.7 FL (ref 81.4–97.8)
MICRO URNS: ABNORMAL
MICROCYTES BLD QL SMEAR: ABNORMAL
MONOCYTES # BLD AUTO: 0.14 K/UL (ref 0–0.85)
MONOCYTES NFR BLD AUTO: 1.6 % (ref 0–13.4)
MORPHOLOGY BLD-IMP: NORMAL
NEUTROPHILS # BLD AUTO: 7.95 K/UL (ref 2–7.15)
NEUTROPHILS NFR BLD: 91.3 % (ref 44–72)
NITRITE UR QL STRIP.AUTO: NEGATIVE
NRBC # BLD AUTO: 0 K/UL
NRBC BLD-RTO: 0 /100 WBC
OVALOCYTES BLD QL SMEAR: NORMAL
PH UR STRIP.AUTO: 5.5 [PH] (ref 5–8)
PLATELET # BLD AUTO: 218 K/UL (ref 164–446)
PLATELET BLD QL SMEAR: NORMAL
PMV BLD AUTO: 11.4 FL (ref 9–12.9)
POIKILOCYTOSIS BLD QL SMEAR: NORMAL
POTASSIUM SERPL-SCNC: 4.9 MMOL/L (ref 3.6–5.5)
PROT SERPL-MCNC: 7.1 G/DL (ref 6–8.2)
PROT UR QL STRIP: NEGATIVE MG/DL
RBC # BLD AUTO: 4.63 M/UL (ref 4.2–5.4)
RBC BLD AUTO: PRESENT
RBC UR QL AUTO: NEGATIVE
SODIUM SERPL-SCNC: 142 MMOL/L (ref 135–145)
SP GR UR STRIP.AUTO: 1.01
UROBILINOGEN UR STRIP.AUTO-MCNC: 0.2 MG/DL
WBC # BLD AUTO: 8.7 K/UL (ref 4.8–10.8)

## 2021-08-10 PROCEDURE — 700101 HCHG RX REV CODE 250: Performed by: EMERGENCY MEDICINE

## 2021-08-10 PROCEDURE — 83690 ASSAY OF LIPASE: CPT

## 2021-08-10 PROCEDURE — A9270 NON-COVERED ITEM OR SERVICE: HCPCS | Performed by: EMERGENCY MEDICINE

## 2021-08-10 PROCEDURE — 700102 HCHG RX REV CODE 250 W/ 637 OVERRIDE(OP): Performed by: EMERGENCY MEDICINE

## 2021-08-10 PROCEDURE — 99284 EMERGENCY DEPT VISIT MOD MDM: CPT

## 2021-08-10 PROCEDURE — 85025 COMPLETE CBC W/AUTO DIFF WBC: CPT

## 2021-08-10 PROCEDURE — 80053 COMPREHEN METABOLIC PANEL: CPT

## 2021-08-10 PROCEDURE — 81003 URINALYSIS AUTO W/O SCOPE: CPT

## 2021-08-10 PROCEDURE — G0152 HHCP-SERV OF OT,EA 15 MIN: HCPCS

## 2021-08-10 RX ORDER — LIDOCAINE 50 MG/G
1 PATCH TOPICAL EVERY 24 HOURS
Qty: 5 PATCH | Refills: 0 | Status: SHIPPED | OUTPATIENT
Start: 2021-08-10 | End: 2021-08-16

## 2021-08-10 RX ORDER — IBUPROFEN 600 MG/1
600 TABLET ORAL ONCE
Status: COMPLETED | OUTPATIENT
Start: 2021-08-10 | End: 2021-08-10

## 2021-08-10 RX ORDER — LIDOCAINE 50 MG/G
1 PATCH TOPICAL EVERY 24 HOURS
Status: DISCONTINUED | OUTPATIENT
Start: 2021-08-10 | End: 2021-08-11 | Stop reason: HOSPADM

## 2021-08-10 RX ORDER — ACETAMINOPHEN 325 MG/1
650 TABLET ORAL ONCE
Status: COMPLETED | OUTPATIENT
Start: 2021-08-10 | End: 2021-08-10

## 2021-08-10 RX ADMIN — IBUPROFEN 600 MG: 600 TABLET ORAL at 23:34

## 2021-08-10 RX ADMIN — LIDOCAINE 1 PATCH: 50 PATCH TOPICAL at 23:34

## 2021-08-10 RX ADMIN — ACETAMINOPHEN 650 MG: 325 TABLET, FILM COATED ORAL at 23:34

## 2021-08-10 ASSESSMENT — ENCOUNTER SYMPTOMS
SEVERE DYSPNEA: 1
LOWEST PAIN SEVERITY IN PAST 24 HOURS: 6/10
DIFFICULTY THINKING: 1
PAIN LOCATION - PAIN QUALITY: STABBING
PAIN SEVERITY GOAL: 2/10
PAIN LOCATION - RELIEVING FACTORS: MEDS, REST
SEVERE DYSPNEA: 1
PAIN LOCATION: LEFT HIP
PAIN LOCATION - PAIN SEVERITY: 6/10
PERSON REPORTING PAIN: PATIENT
HIGHEST PAIN SEVERITY IN PAST 24 HOURS: 6/10
PAIN: 1
DEPRESSED MOOD: 1
DEBILITATING PAIN: 1
SUBJECTIVE PAIN PROGRESSION: WAXING AND WANING
DEPRESSED MOOD: 1
PAIN LOCATION - PAIN FREQUENCY: CONSTANT
POOR JUDGMENT: 1
DIFFICULTY THINKING: 1
DEBILITATING PAIN: 1

## 2021-08-10 ASSESSMENT — FIBROSIS 4 INDEX: FIB4 SCORE: 0.83

## 2021-08-11 ENCOUNTER — HOME CARE VISIT (OUTPATIENT)
Dept: HOME HEALTH SERVICES | Facility: HOME HEALTHCARE | Age: 46
End: 2021-08-11
Payer: MEDICAID

## 2021-08-11 VITALS
RESPIRATION RATE: 16 BRPM | SYSTOLIC BLOOD PRESSURE: 110 MMHG | DIASTOLIC BLOOD PRESSURE: 70 MMHG | OXYGEN SATURATION: 97 % | TEMPERATURE: 97.6 F | HEART RATE: 78 BPM

## 2021-08-11 PROCEDURE — G0151 HHCP-SERV OF PT,EA 15 MIN: HCPCS

## 2021-08-11 ASSESSMENT — ENCOUNTER SYMPTOMS
PAIN LOCATION - RELIEVING FACTORS: DISTRACTION, POSITION CHANGE
SUBJECTIVE PAIN PROGRESSION: UNCHANGED
PAIN SEVERITY GOAL: 0/10
PERSON REPORTING PAIN: PATIENT
PAIN LOCATION - PAIN FREQUENCY: INTERMITTENT
DEBILITATING PAIN: 1
PAIN LOCATION - PAIN SEVERITY: 5/10
PAIN LOCATION: BACK
PAIN: 1

## 2021-08-11 NOTE — ED NOTES
Pt ambulated to red 10.  Agree with triage note.  Pt in gown and placed on monitors.  ERP to see.

## 2021-08-11 NOTE — DISCHARGE INSTRUCTIONS
You were seen in the ER for flank pain.  Thankfully, your labs did not reveal an acute abnormality that requires further work-up, consultation, or admission to the hospital.  I suspect that you strained a muscle in your back.  I have given you Tylenol and Motrin and we placed a Lidoderm patch on the area.  I gave you a prescription for Lidoderm patches, please use them as directed.  Your sugar was high in the ER today.  Please take all of your prescribed medications as directed.  Follow-up with your primary care physician tomorrow for recheck and return immediately to the ER with new or worsening symptoms.  Good luck, I hope you feel better soon!

## 2021-08-11 NOTE — ED NOTES
Discharge instructions given and discussed, signed copy in chart. Pt verbalized understanding and all questions answered. Prescriptions with pt. Pt discharged home in stable condition. Personal belongings with patient.

## 2021-08-11 NOTE — ED TRIAGE NOTES
Pt comes in complaining of left sided abd pain since yesterday. Pt stating flank pain as well. Pt stating nausea, denies trouble with urination.

## 2021-08-11 NOTE — ED PROVIDER NOTES
ED Provider Note    CHIEF COMPLAINT  Chief Complaint   Patient presents with   • Abdominal Pain   • Flank Pain       HPI  Lorene Haro is a 45 y.o. female who presents with a chief complaint of left flank pain that started 2 days ago.  It is burning in character and radiates to the left upper quadrant.  She denies any alcohol or drug use.  She notes that she has tried warm milk without improvement.  She denies any fevers or chills, chest pain, shortness of breath, dysuria, hematuria, nausea, vomiting.  She denies any trauma or heavy lifting.  She does have an appoint with her primary care physician on  for follow-up.    REVIEW OF SYSTEMS  See HPI for further details.  Left-sided flank pain.  Left upper quadrant abdominal pain.  All other systems are negative.     PAST MEDICAL HISTORY   has a past medical history of Asthma, Bipolar 2 disorder (HCC), Chickenpox, Chronic obstructive pulmonary disease (HCC), Hypertension, On home oxygen therapy, Other psychological stress, Schizophrenic disorder (HCC), and Yeast infection of the skin (2021).    SOCIAL HISTORY  Social History     Tobacco Use   • Smoking status: Current Every Day Smoker     Packs/day: 0.10     Years: 20.00     Pack years: 2.00     Types: Cigarettes     Last attempt to quit: 2020     Years since quittin.6   • Smokeless tobacco: Never Used   Vaping Use   • Vaping Use: Never used   Substance and Sexual Activity   • Alcohol use: No   • Drug use: No   • Sexual activity: Not Currently       SURGICAL HISTORY   has a past surgical history that includes hysterectomy laparoscopy; colectomy; cholecystectomy; and knee arthroscopy (Left).    CURRENT MEDICATIONS  Home Medications    **Home medications have not yet been reviewed for this encounter**         ALLERGIES  Allergies   Allergen Reactions   • Amoxicillin Rash and Itching     Tolerates cephalosporins, pt reports that she gets a rash all over her body and gets itchy   • Penicillin G  Rash and Itching     pt reports that she gets a rash all over her body and gets itchy       PHYSICAL EXAM  VITAL SIGNS: /62   Pulse 72   Temp 36.6 °C (97.9 °F) (Temporal)   Resp 16   Wt (!) 131 kg (288 lb 5.8 oz)   LMP  (LMP Unknown)   SpO2 98%   BMI 52.74 kg/m²    Pulse ox interpretation: I interpret this pulse ox as normal.  Constitutional: Alert in no apparent distress.  HENT: No signs of trauma, Bilateral external ears normal, Nose normal.  Moist mucous membranes.  Eyes: Pupils are equal and reactive, Conjunctiva normal, Non-icteric.   Neck: Normal range of motion, No tenderness, Supple, No stridor.   Lymphatic: No lymphadenopathy noted.   Cardiovascular: Regular rate and rhythm, no murmurs. Pulses symmetrical.  Thorax & Lungs: Normal breath sounds, No respiratory distress, No wheezing, No chest tenderness.   Abdomen: Bowel sounds normal, Soft, No tenderness, No masses, No pulsatile masses. No peritoneal signs.  Skin: Warm, Dry, No erythema, No rash.   Back: No bony tenderness, no CVA tenderness.  Point tenderness in the left lumbar paraspinal musculature without erythema, crepitus, fluctuance or evidence of trauma.  Extremities: Intact distal pulses, No edema, No tenderness, No cyanosis.  Musculoskeletal: No major deformities noted.   Neurologic: Alert, No focal deficits noted.   Psychiatric: Affect normal, Judgment normal, Mood normal.     DIAGNOSTIC STUDIES / PROCEDURES    LABS  Results for orders placed or performed during the hospital encounter of 08/10/21   CBC WITH DIFFERENTIAL   Result Value Ref Range    WBC 8.7 4.8 - 10.8 K/uL    RBC 4.63 4.20 - 5.40 M/uL    Hemoglobin 12.6 12.0 - 16.0 g/dL    Hematocrit 42.9 37.0 - 47.0 %    MCV 92.7 81.4 - 97.8 fL    MCH 27.2 27.0 - 33.0 pg    MCHC 29.4 (L) 33.6 - 35.0 g/dL    RDW 53.9 (H) 35.9 - 50.0 fL    Platelet Count 218 164 - 446 K/uL    MPV 11.4 9.0 - 12.9 fL    Neutrophils-Polys 91.30 (H) 44.00 - 72.00 %    Lymphocytes 6.40 (L) 22.00 - 41.00 %     Monocytes 1.60 0.00 - 13.40 %    Eosinophils 0.00 0.00 - 6.90 %    Basophils 0.10 0.00 - 1.80 %    Immature Granulocytes 0.60 0.00 - 0.90 %    Nucleated RBC 0.00 /100 WBC    Neutrophils (Absolute) 7.95 (H) 2.00 - 7.15 K/uL    Lymphs (Absolute) 0.56 (L) 1.00 - 4.80 K/uL    Monos (Absolute) 0.14 0.00 - 0.85 K/uL    Eos (Absolute) 0.00 0.00 - 0.51 K/uL    Baso (Absolute) 0.01 0.00 - 0.12 K/uL    Immature Granulocytes (abs) 0.05 0.00 - 0.11 K/uL    NRBC (Absolute) 0.00 K/uL    Anisocytosis 1+     Microcytosis 1+    COMP METABOLIC PANEL   Result Value Ref Range    Sodium 142 135 - 145 mmol/L    Potassium 4.9 3.6 - 5.5 mmol/L    Chloride 102 96 - 112 mmol/L    Co2 28 20 - 33 mmol/L    Anion Gap 12.0 7.0 - 16.0    Glucose 268 (H) 65 - 99 mg/dL    Bun 15 8 - 22 mg/dL    Creatinine 0.92 0.50 - 1.40 mg/dL    Calcium 9.3 8.5 - 10.5 mg/dL    AST(SGOT) 12 12 - 45 U/L    ALT(SGPT) 21 2 - 50 U/L    Alkaline Phosphatase 111 (H) 30 - 99 U/L    Total Bilirubin 0.4 0.1 - 1.5 mg/dL    Albumin 4.1 3.2 - 4.9 g/dL    Total Protein 7.1 6.0 - 8.2 g/dL    Globulin 3.0 1.9 - 3.5 g/dL    A-G Ratio 1.4 g/dL   LIPASE   Result Value Ref Range    Lipase 28 11 - 82 U/L   URINALYSIS    Specimen: Urine   Result Value Ref Range    Color Yellow     Character Clear     Specific Gravity 1.015 <1.035    Ph 5.5 5.0 - 8.0    Glucose >=1000 (A) Negative mg/dL    Ketones Negative Negative mg/dL    Protein Negative Negative mg/dL    Bilirubin Negative Negative    Urobilinogen, Urine 0.2 Negative    Nitrite Negative Negative    Leukocyte Esterase Negative Negative    Occult Blood Negative Negative    Micro Urine Req see below    ESTIMATED GFR   Result Value Ref Range    GFR If African American >60 >60 mL/min/1.73 m 2    GFR If Non African American >60 >60 mL/min/1.73 m 2   PERIPHERAL SMEAR REVIEW   Result Value Ref Range    Peripheral Smear Review see below    PLATELET ESTIMATE   Result Value Ref Range    Plt Estimation Normal    MORPHOLOGY   Result Value Ref  Range    RBC Morphology Present     Poikilocytosis 1+     Ovalocytes 1+     Tear Drop Cells 1+    DIFFERENTIAL COMMENT   Result Value Ref Range    Comments-Diff see below        COURSE & MEDICAL DECISION MAKING  Pertinent Labs & Imaging studies reviewed. (See chart for details)  Records obtained and reviewed: This is the patient's 26 visit to the emergency department since January of this year.  Her most recent visit was 3 days ago for cough.  Noted that she has a history of COPD and is compliant on Coumadin.  She was apparently discharged from this facility on July 27 after a 5-day hospitalization for acute on chronic respiratory failure secondary to COPD.  She was encouraged to stop smoking.  She is oxygen dependent at baseline on 2 L.  Also noted that she is morbidly obese and bedbound.  Labs and imaging did not demonstrate acute abnormality that required hospitalization.  She was started on Keflex for what appeared to be a developing cellulitis to her bilateral anterior lower legs.    This is a marychuy 45-year-old female who is here with 2 days of left-sided flank pain radiating to the left upper quadrant.  Differential diagnosis includes, but is not limited to, shingles, pyelonephritis, trauma, fracture, muscle sprain/strain, aortic dissection.  Arrives afebrile with normal vital signs.  Appears well-hydrated and nontoxic.  She has focal tenderness in the left paraspinal lumbar musculature without any bony tenderness.  No fevers or IV drug use to suggest discitis or osteomyelitis.  No red flag symptoms to suggest epidural abscess.  No weakness or numbness in her lower extremities above baseline, no saddle anesthesia or incontinence.    Labs are without leukocytosis.  She does have hyperglycemia with a history of the same. She has no ketones in her urine. Her vital signs are normal and stable. This should be followed up closely as an outpatient and I sent an email about the hyperglycemia to her PCP with whom she  has an appointment on 8/12 in two days. Urinalysis does not suggest infection.  Lipase is normal.  I gave her Tylenol and Motrin and and Lidoderm patch was placed.    Patient eager for discharge. She was given a prescription for lidoderm patches. She will follow up with her PMD in two days for recheck and return immediately with new or worsening symptoms. Discharged in good and stable condition.    The patient will return for worsening symptoms and is stable at the time of discharge. The patient verbalizes understanding and will comply.    FINAL IMPRESSION  1. Flank pain     2. Hyperglycemia           Electronically signed by: Dawit Orellana M.D., 8/10/2021 10:40 PM

## 2021-08-12 ENCOUNTER — HOME CARE VISIT (OUTPATIENT)
Dept: HOME HEALTH SERVICES | Facility: HOME HEALTHCARE | Age: 46
End: 2021-08-12
Payer: MEDICAID

## 2021-08-12 ENCOUNTER — APPOINTMENT (OUTPATIENT)
Dept: SLEEP MEDICINE | Facility: MEDICAL CENTER | Age: 46
End: 2021-08-12
Payer: MEDICAID

## 2021-08-12 ENCOUNTER — ANTICOAGULATION MONITORING (OUTPATIENT)
Dept: VASCULAR LAB | Facility: MEDICAL CENTER | Age: 46
End: 2021-08-12

## 2021-08-12 VITALS
RESPIRATION RATE: 16 BRPM | HEART RATE: 70 BPM | OXYGEN SATURATION: 98 % | TEMPERATURE: 97.1 F | DIASTOLIC BLOOD PRESSURE: 64 MMHG | SYSTOLIC BLOOD PRESSURE: 106 MMHG

## 2021-08-12 DIAGNOSIS — I48.0 PAROXYSMAL A-FIB (HCC): ICD-10-CM

## 2021-08-12 LAB
BACTERIA BLD CULT: NORMAL
BACTERIA BLD CULT: NORMAL
INR PPP: 1.9 - CRITICAL LOW: < 0.8, CRITICAL HIGH: > 6.5 (ref 2–3.5)
SIGNIFICANT IND 70042: NORMAL
SIGNIFICANT IND 70042: NORMAL
SITE SITE: NORMAL
SITE SITE: NORMAL
SOURCE SOURCE: NORMAL
SOURCE SOURCE: NORMAL

## 2021-08-12 PROCEDURE — G0299 HHS/HOSPICE OF RN EA 15 MIN: HCPCS

## 2021-08-12 ASSESSMENT — ENCOUNTER SYMPTOMS
SHORTNESS OF BREATH: T
PAIN SEVERITY GOAL: 0/10
PERSON REPORTING PAIN: PATIENT
VOMITING: DENIES
PAIN: 1
SUBJECTIVE PAIN PROGRESSION: GRADUALLY IMPROVING
LOWEST PAIN SEVERITY IN PAST 24 HOURS: 1/10
HIGHEST PAIN SEVERITY IN PAST 24 HOURS: 1/10
MUSCLE WEAKNESS: 1
POOR JUDGMENT: 1

## 2021-08-12 ASSESSMENT — ACTIVITIES OF DAILY LIVING (ADL)
CURRENT_FUNCTION: STAND BY ASSIST
AMBULATION ASSISTANCE: STAND BY ASSIST

## 2021-08-12 NOTE — PROGRESS NOTES
Anticoagulation Summary  As of 2021    INR goal:  2.0-3.0   TTR:  22.8 % (3.7 mo)   INR used for dosin.90 (2021)   Warfarin maintenance plan:  3 mg (6 mg x 0.5) every Mon; 6 mg (6 mg x 1) all other days   Weekly warfarin total:  39 mg   Plan last modified:  Ciro Mohamud, PharmD (2021)   Next INR check:  2021   Target end date:  Indefinite    Indications    Paroxysmal A-fib (HCC) [I48.0]             Anticoagulation Episode Summary     INR check location:      Preferred lab:      Send INR reminders to:      Comments:  [facility]           Refer to Anticoagulation Patient Findings for HPI    Spoke with Lorene Haro.  INR is sub therapeutic.     Pt verifies warfarin weekly dosing.     Pt is NOT on antiplatelet therapy    Will have pt continue current regimen    Repeat INR in 4 days    Kurt Cai, PharmD   Discussed with Kinjal Gant, PharmD.    ADDENDUM:  INRs have been labile.  Will continue current regimen for one more week.  Ceci Coe, Clinical Pharmacist, CDE, CACP

## 2021-08-13 LAB — INR PPP: 1.9 (ref 2–3.5)

## 2021-08-16 ENCOUNTER — ANTICOAGULATION MONITORING (OUTPATIENT)
Dept: VASCULAR LAB | Facility: MEDICAL CENTER | Age: 46
End: 2021-08-16

## 2021-08-16 ENCOUNTER — TELEPHONE (OUTPATIENT)
Dept: CARDIOLOGY | Facility: MEDICAL CENTER | Age: 46
End: 2021-08-16

## 2021-08-16 ENCOUNTER — SLEEP CENTER VISIT (OUTPATIENT)
Dept: SLEEP MEDICINE | Facility: MEDICAL CENTER | Age: 46
End: 2021-08-16
Payer: MEDICAID

## 2021-08-16 ENCOUNTER — HOME CARE VISIT (OUTPATIENT)
Dept: HOME HEALTH SERVICES | Facility: HOME HEALTHCARE | Age: 46
End: 2021-08-16
Payer: MEDICAID

## 2021-08-16 VITALS
TEMPERATURE: 98.2 F | DIASTOLIC BLOOD PRESSURE: 66 MMHG | RESPIRATION RATE: 20 BRPM | SYSTOLIC BLOOD PRESSURE: 126 MMHG | BODY MASS INDEX: 52.63 KG/M2 | WEIGHT: 286 LBS | HEIGHT: 62 IN | HEART RATE: 87 BPM | OXYGEN SATURATION: 87 %

## 2021-08-16 VITALS
TEMPERATURE: 97.4 F | OXYGEN SATURATION: 94 % | SYSTOLIC BLOOD PRESSURE: 133 MMHG | DIASTOLIC BLOOD PRESSURE: 78 MMHG | RESPIRATION RATE: 18 BRPM | HEART RATE: 84 BPM

## 2021-08-16 DIAGNOSIS — J96.11 CHRONIC RESPIRATORY FAILURE WITH HYPOXIA AND HYPERCAPNIA (HCC): ICD-10-CM

## 2021-08-16 DIAGNOSIS — I48.0 PAROXYSMAL A-FIB (HCC): ICD-10-CM

## 2021-08-16 DIAGNOSIS — J96.12 CHRONIC RESPIRATORY FAILURE WITH HYPOXIA AND HYPERCAPNIA (HCC): ICD-10-CM

## 2021-08-16 DIAGNOSIS — E66.01 CLASS 3 SEVERE OBESITY WITH BODY MASS INDEX (BMI) OF 50.0 TO 59.9 IN ADULT, UNSPECIFIED OBESITY TYPE, UNSPECIFIED WHETHER SERIOUS COMORBIDITY PRESENT (HCC): ICD-10-CM

## 2021-08-16 DIAGNOSIS — G47.33 OSA (OBSTRUCTIVE SLEEP APNEA): ICD-10-CM

## 2021-08-16 DIAGNOSIS — G47.33 OSA AND COPD OVERLAP SYNDROME (HCC): ICD-10-CM

## 2021-08-16 DIAGNOSIS — Z72.0 TOBACCO USE: ICD-10-CM

## 2021-08-16 DIAGNOSIS — J44.9 OSA AND COPD OVERLAP SYNDROME (HCC): ICD-10-CM

## 2021-08-16 LAB
INR PPP: 1.9 (ref 2–3.5)
INR PPP: 1.9 - CRITICAL LOW: < 0.8, CRITICAL HIGH: > 6.5 (ref 2–3.5)

## 2021-08-16 PROCEDURE — G0493 RN CARE EA 15 MIN HH/HOSPICE: HCPCS

## 2021-08-16 PROCEDURE — 99214 OFFICE O/P EST MOD 30 MIN: CPT | Performed by: INTERNAL MEDICINE

## 2021-08-16 SDOH — ECONOMIC STABILITY: HOUSING INSECURITY: EVIDENCE OF SMOKING MATERIAL: 1

## 2021-08-16 ASSESSMENT — ENCOUNTER SYMPTOMS
CLAUDICATION: 0
DIAPHORESIS: 0
DEPRESSED MOOD: 1
NAUSEA: 0
VOMITING: DENIES
FALLS: 0
PALPITATIONS: 0
PND: 0
WEIGHT LOSS: 0
POOR JUDGMENT: 1
HEMOPTYSIS: 0
SHORTNESS OF BREATH: 1
MYALGIAS: 0
VOMITING: 0
DIZZINESS: 0
AGITATION: 1
DOUBLE VISION: 0
SEVERE DYSPNEA: 1
ORTHOPNEA: 0
CONSTIPATION: 0
EYE PAIN: 0
EYE DISCHARGE: 0
COUGH: 0
FEVER: 0
DEPRESSION: 0
TREMORS: 0
ABDOMINAL PAIN: 0
NAUSEA: DENIES
STRIDOR: 0
WEAKNESS: 0
SORE THROAT: 0
SPUTUM PRODUCTION: 0
WHEEZING: 0
EYE REDNESS: 0
DENIES PAIN: 1
CHILLS: 0
FOCAL WEAKNESS: 0
BLURRED VISION: 0
DIARRHEA: 0
SINUS PAIN: 0
NECK PAIN: 0
HEADACHES: 0
HEARTBURN: 0
BACK PAIN: 0
SPEECH CHANGE: 0
PHOTOPHOBIA: 0

## 2021-08-16 ASSESSMENT — FIBROSIS 4 INDEX: FIB4 SCORE: 0.56

## 2021-08-16 NOTE — TELEPHONE ENCOUNTER
PT scheduled.   [facility]    ----- Message from Abdirashid Guevara Ass't sent at 8/6/2021  9:41 AM PDT -----  Regarding: Follow-up  Patient is due for a follow up with JA or VR, please contact patient to schedule an appointment for further refills of their medications.    Thank you,  Carolann Mendenhall Ass't

## 2021-08-16 NOTE — PROGRESS NOTES
Chief Complaint   Patient presents with   • COPD     last seen 11/16/2020 DARRIN [facility] pa-[facility] Admit: 8/7/2021, D/C: 8/7/2021  DX SOB          HPI: This patient is a 45 y.o. female whom is followed in our clinic for combined hypoxic and hypercapnic respiratory failure in the setting of obstructive lung disease and KATHIA not currently being treated last seen by Jessica ZAPIEN on 11/16/20. The pt has BPD, HTN and recurrent ED visits on average 2-3 times per month, often for chest pain. She is a former smoker per pt with 20 pk year hx and states quit earlier this month although not clear. She is on spiriva and prn albuterol which she tells me she uses on average 1-2x/week.  Pulmonary function testing from May 2020 showed pseudorestriction with reduction in FVC and FEV1 and elevated FEV1/FVC ratio of 89%, low normal total lung capacity and normal DLCO with no bronchodilator response.  Patient is on warfarin for paroxysmal atrial fibrillation.  She is followed in our sleep medicine clinic last seen in August of last year.  She has severe KATHIA with an AHI of 130 and was previously prescribed AVAPS.  She was compliant with therapy at her last clinic visit but tells me she has been off therapy for at least the past month due to malfunctioning of her machine.  Overall her picture is 1 of chronic hypercapnic respiratory failure and she is actually falling asleep while talking to me in clinic today.  She was last hospitalized in July for shortness of breath and ruled out for PE.  CT chest at that time showed no consolidation or pleural effusion.  It sounds as though the main recommendation at that time was to repeat sleep study to ensure adequate therapy which is nonexistent at present.    Past Medical History:   Diagnosis Date   • Asthma    • Bipolar 2 disorder (HCC)    • Chickenpox    • Chronic obstructive pulmonary disease (HCC)    • Hypertension    • On home oxygen therapy    • Other psychological stress    • Schizophrenic  disorder (HCC)    • Yeast infection of the skin 2021       Social History     Socioeconomic History   • Marital status: Single     Spouse name: Not on file   • Number of children: Not on file   • Years of education: Not on file   • Highest education level: Not on file   Occupational History   • Not on file   Tobacco Use   • Smoking status: Former Smoker     Packs/day: 1.00     Years: 20.00     Pack years: 20.00     Types: Cigarettes     Quit date: 2021     Years since quittin.0   • Smokeless tobacco: Never Used   Vaping Use   • Vaping Use: Never used   Substance and Sexual Activity   • Alcohol use: No   • Drug use: No   • Sexual activity: Not Currently   Other Topics Concern   • Not on file   Social History Narrative    From [place]     Social Determinants of Health     Financial Resource Strain: Medium Risk   • Difficulty of Paying Living Expenses: Somewhat hard   Food Insecurity: Food Insecurity Present   • Worried About Running Out of Food in the Last Year: Sometimes true   • Ran Out of Food in the Last Year: Sometimes true   Transportation Needs: No Transportation Needs   • Lack of Transportation (Medical): No   • Lack of Transportation (Non-Medical): No   Physical Activity:    • Days of Exercise per Week:    • Minutes of Exercise per Session:    Stress:    • Feeling of Stress :    Social Connections:    • Frequency of Communication with Friends and Family:    • Frequency of Social Gatherings with Friends and Family:    • Attends [employer] Services:    • Active Member of Clubs or Organizations:    • Attends Club or Organization Meetings:    • Marital Status:    Intimate Partner Violence:    • Fear of Current or Ex-Partner:    • Emotionally Abused:    • Physically Abused:    • Sexually Abused:        Family History   Problem Relation Age of Onset   • No Known Problems Mother    • Cancer Sister        Current Outpatient Medications on File Prior to Visit   Medication Sig Dispense Refill   • furosemide  (LASIX) 20 MG Tab Take 20 mg by mouth 2 times a day.     • diclofenac sodium 3 % Gel Apply 1 g topically 3 times a day as needed. 100 g 0   • fluticasone (FLONASE) 50 MCG/ACT nasal spray Administer 1-2 Sprays into affected nostril(S) 2 times a day.     • warfarin (COUMADIN) 6 MG Tab Take 6-9 mg by mouth every day. Warfarin maintenance plan:  9 mg (6 mg x 1.5) every Mon, Thu; 6 mg (6 mg x 1) all other days     • nitroglycerin (NITROSTAT) 0.4 MG SL Tab Place 1 tablet under the tongue as needed for Chest Pain. 25 tablet 0   • albuterol 108 (90 Base) MCG/ACT Aero Soln inhalation aerosol Inhale 2 Puffs every 6 hours as needed for Shortness of Breath. 8.5 g 0   • acetaminophen (TYLENOL) 500 MG Tab Take 500 mg by mouth every 6 hours as needed (pain).     • potassium chloride SA (KDUR) 20 MEQ Tab CR Take 1 tablet by mouth every day. 30 tablet 3   • tiotropium (SPIRIVA HANDIHALER) 18 MCG Cap Inhale 1 Cap by mouth every day. 30 Cap 3   • aripiprazole (ABILIFY) 30 MG tablet Take 30 mg by mouth every morning.     • Home Care Oxygen Inhale 2 L/min continuous. Oxygen is at 2L/NC continuously  Oxygen dose range: 2 L/min  Respiratory route via: Nasal Cannula   Oxygen supplier:[facility]         • famotidine (PEPCID) 40 MG Tab Take 40 mg by mouth every day. Indications: Heartburn     • montelukast (SINGULAIR) 10 MG Tab Take 1 Tab by mouth every bedtime. 30 Tab 11   • atorvastatin (LIPITOR) 20 MG Tab Take 1 Tab by mouth every day. 30 Tab 1     No current facility-administered medications on file prior to visit.       Amoxicillin and Penicillin g      ROS:   Review of Systems   Constitutional: Negative for chills, diaphoresis, fever, malaise/fatigue and weight loss.   HENT: Negative for congestion, ear discharge, ear pain, hearing loss, nosebleeds, sinus pain, sore throat and tinnitus.    Eyes: Negative for blurred vision, double vision, photophobia, pain, discharge and redness.   Respiratory: Positive for shortness of breath. Negative  "for cough, hemoptysis, sputum production, wheezing and stridor.    Cardiovascular: Positive for leg swelling. Negative for chest pain, palpitations, orthopnea, claudication and PND.   Gastrointestinal: Negative for abdominal pain, constipation, diarrhea, heartburn, nausea and vomiting.   Genitourinary: Negative for dysuria and urgency.   Musculoskeletal: Negative for back pain, falls, joint pain, myalgias and neck pain.   Skin: Negative for itching and rash.   Neurological: Negative for dizziness, tremors, speech change, focal weakness, weakness and headaches.   Endo/Heme/Allergies: Negative for environmental allergies.   Psychiatric/Behavioral: Negative for depression.       /66 (BP Location: Right arm, Patient Position: Sitting, BP Cuff Size: Large adult)   Pulse 87   Temp 36.8 °C (98.2 °F) (Temporal)   Resp 20   Ht 1.575 m (5' 2\")   Wt (!) 130 kg (286 lb)   SpO2 (!) 87%   Physical Exam  Vitals reviewed.   Constitutional:       General: She is not in acute distress.     Appearance: Normal appearance. She is well-developed. She is obese.   HENT:      Head: Normocephalic and atraumatic.      Right Ear: External ear normal.      Left Ear: External ear normal.      Nose: Nose normal. No congestion.      Mouth/Throat:      Mouth: Mucous membranes are moist.      Pharynx: Oropharynx is clear. No oropharyngeal exudate.   Eyes:      General: No scleral icterus.     Extraocular Movements: Extraocular movements intact.      Conjunctiva/sclera: Conjunctivae normal.      Pupils: Pupils are equal, round, and reactive to light.   Neck:      Vascular: No JVD.      Trachea: No tracheal deviation.   Cardiovascular:      Rate and Rhythm: Normal rate and regular rhythm.      Heart sounds: Normal heart sounds. No murmur heard.   No friction rub. No gallop.    Pulmonary:      Effort: Pulmonary effort is normal. No accessory muscle usage or respiratory distress.      Breath sounds: No wheezing or rales.      Comments: " Shallow breathing b/l  Abdominal:      General: There is no distension.      Palpations: Abdomen is soft.      Tenderness: There is no abdominal tenderness.   Musculoskeletal:         General: No tenderness or deformity. Normal range of motion.      Cervical back: Normal range of motion and neck supple.      Right lower leg: Edema present.      Left lower leg: Edema present.      Comments: 2+ pitting edema b/l LE to just below the knees   Lymphadenopathy:      Cervical: No cervical adenopathy.   Skin:     General: Skin is warm and dry.      Findings: No rash.      Nails: There is no clubbing.   Neurological:      Mental Status: She is alert and oriented to person, place, and time.      Cranial Nerves: No cranial nerve deficit.      Gait: Gait normal.   Psychiatric:         Mood and Affect: Mood normal.         Behavior: Behavior normal.         PFTs as reviewed by me personally:as per hPI    Imaging as reviewed by me personally:  As per HPI    Assessment:  1. Chronic respiratory failure with hypoxia and hypercapnia (HCC)  DME O2 New Set Up    PULMONARY FUNCTION TESTS -Test requested: Complete Pulmonary Function Test   2. KATHIA and COPD overlap syndrome (HCC)  PULMONARY FUNCTION TESTS -Test requested: Complete Pulmonary Function Test   3. KATHIA (obstructive sleep apnea)     4. Class 3 severe obesity with body mass index (BMI) of 50.0 to 59.9 in adult, unspecified obesity type, unspecified whether serious comorbidity present (HCC)     5. Tobacco use         Plan:  1.  This patient has chronic hypercapnic and hypoxic respiratory failure likely mainly secondary to untreated, severe obstructive sleep apnea and associated obesity hypoventilation syndrome.  I think the most pertinent thing for this patient at this time is optimization of sleep apnea therapy of which she is on no therapy at this point.  We will get her follow-up with sleep medicine and ensure she has supplemental oxygen for use 24 hours/day at 2 L/min.  She  reports being tobacco free and encouraged ongoing abstinence.  We will update pulmonary function testing and have her follow-up with us in 3 months in sleep clinic as soon as possible.  Continue Spiriva and short acting bronchodilators.  2.  See discussion above.  3.  This was quite severe and she has not seen sleep in over a year.  She reports being without therapy for a month but I suspect longer given hospitalization and June.  We discussed the risks of going without CPAP therapy particularly given severity of her sleep apnea and echocardiographic evidence of pulmonary hypertension in the past.  We will schedule follow-up with sleep at discharge today.  4.  This does put patient at increased risk for obesity related pulmonary complications.  Encourage healthy lifestyle habits.  5.  Patient reports tobacco cessation.  She has not had a candidate for lung cancer screening.  Encouraged ongoing abstinence.  Return in about 3 months (around 11/16/2021) for Nahid ZAPIEN, also please schedule f/u sleep apt in sleep medicine clinic.

## 2021-08-17 ENCOUNTER — HOME CARE VISIT (OUTPATIENT)
Dept: HOME HEALTH SERVICES | Facility: HOME HEALTHCARE | Age: 46
End: 2021-08-17
Payer: MEDICAID

## 2021-08-17 VITALS
TEMPERATURE: 97 F | OXYGEN SATURATION: 94 % | SYSTOLIC BLOOD PRESSURE: 105 MMHG | HEART RATE: 88 BPM | DIASTOLIC BLOOD PRESSURE: 70 MMHG | RESPIRATION RATE: 18 BRPM

## 2021-08-17 PROCEDURE — G0152 HHCP-SERV OF OT,EA 15 MIN: HCPCS

## 2021-08-17 ASSESSMENT — ENCOUNTER SYMPTOMS
AGITATION: 1
SEVERE DYSPNEA: 1
DENIES PAIN: 1
DIFFICULTY THINKING: 1
POOR JUDGMENT: 1
DEPRESSED MOOD: 1
DEBILITATING PAIN: 1

## 2021-08-17 NOTE — PROGRESS NOTES
OP Telephone Anticoagulation Service Note    Date: 2021      Anticoagulation Summary  As of 2021    INR goal:  2.0-3.0   TTR:  22.2 % (3.8 mo)   INR used for dosin.90 (2021)   Warfarin maintenance plan:  3 mg (6 mg x 0.5) every Mon; 6 mg (6 mg x 1) all other days   Weekly warfarin total:  39 mg   Plan last modified:  Ciro Mohamud PharmD (2021)   Next INR check:  2021   Target end date:  Indefinite    Indications    Paroxysmal A-fib (HCC) [I48.0]             Anticoagulation Episode Summary     INR check location:      Preferred lab:      Send INR reminders to:      Comments:  [facility]         Anticoagulation Patient Findings      INR SUB-therapeutic at 1.9.  Left voicemail message for pt.  Verified regimen.  Instructed pt to bolus x 1 dose w/ 6 mg and to then continue on w/ her current regimen.  Told pt to call if any s/s of bleeding or medication changes.  Check INR in 2 day(s).  Instructed pt to call clinic at [phone] if there are any questions.    Orders faxed to [facility].    Chris Kang PharmD

## 2021-08-19 ENCOUNTER — HOME CARE VISIT (OUTPATIENT)
Dept: HOME HEALTH SERVICES | Facility: HOME HEALTHCARE | Age: 46
End: 2021-08-19
Payer: MEDICAID

## 2021-08-19 ENCOUNTER — ANTICOAGULATION MONITORING (OUTPATIENT)
Dept: VASCULAR LAB | Facility: MEDICAL CENTER | Age: 46
End: 2021-08-19

## 2021-08-19 VITALS
SYSTOLIC BLOOD PRESSURE: 102 MMHG | DIASTOLIC BLOOD PRESSURE: 58 MMHG | HEART RATE: 72 BPM | OXYGEN SATURATION: 98 % | TEMPERATURE: 98 F | RESPIRATION RATE: 18 BRPM

## 2021-08-19 DIAGNOSIS — I48.0 PAROXYSMAL A-FIB (HCC): ICD-10-CM

## 2021-08-19 LAB
INR PPP: 1.7 (ref 2–3.5)
INR PPP: 1.7 - CRITICAL LOW: < 0.8, CRITICAL HIGH: > 6.5 (ref 2–3.5)

## 2021-08-19 PROCEDURE — G0299 HHS/HOSPICE OF RN EA 15 MIN: HCPCS

## 2021-08-19 PROCEDURE — 665003 FOLLOW UP-HOME HEALTH

## 2021-08-19 ASSESSMENT — ACTIVITIES OF DAILY LIVING (ADL)
CURRENT_FUNCTION: STAND BY ASSIST
AMBULATION ASSISTANCE: STAND BY ASSIST

## 2021-08-19 ASSESSMENT — ENCOUNTER SYMPTOMS
HIGHEST PAIN SEVERITY IN PAST 24 HOURS: 5/10
POOR JUDGMENT: 1
PAIN SEVERITY GOAL: 0/10
PERSON REPORTING PAIN: PATIENT
DIFFICULTY THINKING: 1
SHORTNESS OF BREATH: T
PAIN: 1
VOMITING: SEVERAL DAYS AGO
MUSCLE WEAKNESS: 1
LOWEST PAIN SEVERITY IN PAST 24 HOURS: 5/10

## 2021-08-20 NOTE — PROGRESS NOTES
Anticoagulation Summary  As of 2021    INR goal:  2.0-3.0   TTR:  21.5 % (3.9 mo)   INR used for dosin.70 (2021)   Warfarin maintenance plan:  3 mg (6 mg x 0.5) every Mon; 6 mg (6 mg x 1) all other days   Weekly warfarin total:  39 mg   Plan last modified:  Ciro Mohamud, PharmD (2021)   Next INR check:  2021   Target end date:  Indefinite    Indications    Paroxysmal A-fib (HCC) [I48.0]             Anticoagulation Episode Summary     INR check location:      Preferred lab:      Send INR reminders to:      Comments:  [facility]        Anticoagulation Patient Findings      HPI:    Spoke with patient by phone. INR is subtherapeutic. Denies any medication or diet changes. No current symptoms of bleeding or thrombosis reported.Instructed to take 9 mg tomorrow then resume 6 mg daily.    Follow up INR on Monday with Clari GIPSON.    Sharri SHRESTHA

## 2021-08-22 ENCOUNTER — HOSPITAL ENCOUNTER (EMERGENCY)
Facility: MEDICAL CENTER | Age: 46
End: 2021-08-22
Attending: EMERGENCY MEDICINE
Payer: MEDICAID

## 2021-08-22 ENCOUNTER — APPOINTMENT (OUTPATIENT)
Dept: RADIOLOGY | Facility: MEDICAL CENTER | Age: 46
End: 2021-08-22
Attending: EMERGENCY MEDICINE
Payer: MEDICAID

## 2021-08-22 VITALS
BODY MASS INDEX: 52.74 KG/M2 | RESPIRATION RATE: 20 BRPM | HEIGHT: 62 IN | HEART RATE: 87 BPM | DIASTOLIC BLOOD PRESSURE: 50 MMHG | WEIGHT: 286.6 LBS | TEMPERATURE: 97.5 F | OXYGEN SATURATION: 97 % | SYSTOLIC BLOOD PRESSURE: 123 MMHG

## 2021-08-22 DIAGNOSIS — I87.2 VENOUS STASIS DERMATITIS OF BOTH LOWER EXTREMITIES: ICD-10-CM

## 2021-08-22 LAB
ALBUMIN SERPL BCP-MCNC: 3.7 G/DL (ref 3.2–4.9)
ALBUMIN/GLOB SERPL: 1.1 G/DL
ALP SERPL-CCNC: 128 U/L (ref 30–99)
ALT SERPL-CCNC: 14 U/L (ref 2–50)
ANION GAP SERPL CALC-SCNC: 11 MMOL/L (ref 7–16)
AST SERPL-CCNC: 11 U/L (ref 12–45)
BASOPHILS # BLD AUTO: 0.2 % (ref 0–1.8)
BASOPHILS # BLD: 0.02 K/UL (ref 0–0.12)
BILIRUB SERPL-MCNC: 0.5 MG/DL (ref 0.1–1.5)
BUN SERPL-MCNC: 7 MG/DL (ref 8–22)
CALCIUM SERPL-MCNC: 9.4 MG/DL (ref 8.5–10.5)
CHLORIDE SERPL-SCNC: 102 MMOL/L (ref 96–112)
CO2 SERPL-SCNC: 28 MMOL/L (ref 20–33)
CREAT SERPL-MCNC: 0.82 MG/DL (ref 0.5–1.4)
EOSINOPHIL # BLD AUTO: 0.13 K/UL (ref 0–0.51)
EOSINOPHIL NFR BLD: 1.2 % (ref 0–6.9)
ERYTHROCYTE [DISTWIDTH] IN BLOOD BY AUTOMATED COUNT: 53.7 FL (ref 35.9–50)
GLOBULIN SER CALC-MCNC: 3.4 G/DL (ref 1.9–3.5)
GLUCOSE SERPL-MCNC: 117 MG/DL (ref 65–99)
HCT VFR BLD AUTO: 40.3 % (ref 37–47)
HGB BLD-MCNC: 12.3 G/DL (ref 12–16)
IMM GRANULOCYTES # BLD AUTO: 0.07 K/UL (ref 0–0.11)
IMM GRANULOCYTES NFR BLD AUTO: 0.7 % (ref 0–0.9)
INR PPP: 2.49 (ref 0.87–1.13)
LYMPHOCYTES # BLD AUTO: 1.77 K/UL (ref 1–4.8)
LYMPHOCYTES NFR BLD: 16.6 % (ref 22–41)
MCH RBC QN AUTO: 27.6 PG (ref 27–33)
MCHC RBC AUTO-ENTMCNC: 30.5 G/DL (ref 33.6–35)
MCV RBC AUTO: 90.4 FL (ref 81.4–97.8)
MONOCYTES # BLD AUTO: 0.68 K/UL (ref 0–0.85)
MONOCYTES NFR BLD AUTO: 6.4 % (ref 0–13.4)
NEUTROPHILS # BLD AUTO: 7.98 K/UL (ref 2–7.15)
NEUTROPHILS NFR BLD: 74.9 % (ref 44–72)
NRBC # BLD AUTO: 0 K/UL
NRBC BLD-RTO: 0 /100 WBC
PLATELET # BLD AUTO: 269 K/UL (ref 164–446)
PMV BLD AUTO: 11.3 FL (ref 9–12.9)
POTASSIUM SERPL-SCNC: 3.8 MMOL/L (ref 3.6–5.5)
PROCALCITONIN SERPL-MCNC: <0.05 NG/ML
PROT SERPL-MCNC: 7.1 G/DL (ref 6–8.2)
PROTHROMBIN TIME: 26.1 SEC (ref 12–14.6)
RBC # BLD AUTO: 4.46 M/UL (ref 4.2–5.4)
SODIUM SERPL-SCNC: 141 MMOL/L (ref 135–145)
WBC # BLD AUTO: 10.7 K/UL (ref 4.8–10.8)

## 2021-08-22 PROCEDURE — 96374 THER/PROPH/DIAG INJ IV PUSH: CPT

## 2021-08-22 PROCEDURE — 93970 EXTREMITY STUDY: CPT

## 2021-08-22 PROCEDURE — 36415 COLL VENOUS BLD VENIPUNCTURE: CPT

## 2021-08-22 PROCEDURE — 85025 COMPLETE CBC W/AUTO DIFF WBC: CPT

## 2021-08-22 PROCEDURE — 84145 PROCALCITONIN (PCT): CPT

## 2021-08-22 PROCEDURE — 80053 COMPREHEN METABOLIC PANEL: CPT

## 2021-08-22 PROCEDURE — 306310 ANTI-EMBOLISM STOCKINGS XXLRG REG

## 2021-08-22 PROCEDURE — 99284 EMERGENCY DEPT VISIT MOD MDM: CPT

## 2021-08-22 PROCEDURE — 85610 PROTHROMBIN TIME: CPT

## 2021-08-22 ASSESSMENT — LIFESTYLE VARIABLES
EVER FELT BAD OR GUILTY ABOUT YOUR DRINKING: NO
AVERAGE NUMBER OF DAYS PER WEEK YOU HAVE A DRINK CONTAINING ALCOHOL: 0
HAVE PEOPLE ANNOYED YOU BY CRITICIZING YOUR DRINKING: NO
DO YOU DRINK ALCOHOL: NO
TOTAL SCORE: 0
HAVE YOU EVER FELT YOU SHOULD CUT DOWN ON YOUR DRINKING: NO
TOTAL SCORE: 0
ON A TYPICAL DAY WHEN YOU DRINK ALCOHOL HOW MANY DRINKS DO YOU HAVE: 0
DOES PATIENT WANT TO STOP DRINKING: NO
CONSUMPTION TOTAL: NEGATIVE
HOW MANY TIMES IN THE PAST YEAR HAVE YOU HAD 5 OR MORE DRINKS IN A DAY: 0
TOTAL SCORE: 0
EVER HAD A DRINK FIRST THING IN THE MORNING TO STEADY YOUR NERVES TO GET RID OF A HANGOVER: NO

## 2021-08-22 ASSESSMENT — PAIN DESCRIPTION - PAIN TYPE: TYPE: ACUTE PAIN;CHRONIC PAIN

## 2021-08-22 ASSESSMENT — FIBROSIS 4 INDEX: FIB4 SCORE: 0.56

## 2021-08-22 NOTE — ED NOTES
Report rec'd from JC Pelaez.   Pt resting in [facility] [place], no distress noted.    Call light in reach.

## 2021-08-22 NOTE — ED NOTES
Compression stockings ordered for pt.  Pt aware of plan to place stockings and than attempt to walk and discharge.

## 2021-08-22 NOTE — ED PROVIDER NOTES
ED Provider Note    CHIEF COMPLAINT  Chief Complaint   Patient presents with   • Leg Pain     PT reports increasing bilateral leg pain not helped by rx meds  PT reports walking is very difficult due to leg pain.       HPI  Lorene Haro is a 45 y.o. female who presents leg swelling and pain.  She reports she was diagnosed with cellulitis.  She was started on prescription for antibiotics and just completed them a couple days ago.  She has not had any improvement with the antibiotic.  She has pressure and throbbing discomfort bilateral lower extremities.  Denies having any fevers or chills.  Denies numbness or weakness.  Denies chest pain, shortness of breath, cough, orthopnea or PND, weight gain    Patient has a history of thromboembolic disease and is prescribed warfarin.  She reports she has been compliant with this.  Most recent INR  1.7.  She had an echocardiogram 3/20 with a EF of 60%.    REVIEW OF SYSTEMS  As per HPI, otherwise a 10 point review of systems is negative    PAST MEDICAL HISTORY  Past Medical History:   Diagnosis Date   • Asthma    • Bipolar 2 disorder (HCC)    • Chickenpox    • Chronic obstructive pulmonary disease (HCC)    • Hypertension    • On home oxygen therapy    • Other psychological stress    • Schizophrenic disorder (HCC)    • Yeast infection of the skin 2021       SOCIAL HISTORY  Social History     Tobacco Use   • Smoking status: Former Smoker     Packs/day: 1.00     Years: 20.00     Pack years: 20.00     Types: Cigarettes     Quit date: 2021     Years since quittin.0   • Smokeless tobacco: Never Used   Vaping Use   • Vaping Use: Never used   Substance Use Topics   • Alcohol use: No   • Drug use: No       SURGICAL HISTORY  Past Surgical History:   Procedure Laterality Date   • CHOLECYSTECTOMY     • COLECTOMY     • HYSTERECTOMY LAPAROSCOPY     • KNEE ARTHROSCOPY Left        CURRENT MEDICATIONS  Home Medications     Reviewed by Latanya Aquino R.N. (Registered  "Nurse) on 08/22/21 at 0622  Med List Status: Partial   Medication Last Dose Status   acetaminophen (TYLENOL) 500 MG Tab  Active   albuterol 108 (90 Base) MCG/ACT Aero Soln inhalation aerosol  Active   aripiprazole (ABILIFY) 30 MG tablet  Active   atorvastatin (LIPITOR) 20 MG Tab  Active   diclofenac sodium 3 % Gel  Active   famotidine (PEPCID) 40 MG Tab  Active   fluticasone (FLONASE) 50 MCG/ACT nasal spray  Active   furosemide (LASIX) 20 MG Tab  Active   Home Care Oxygen  Active   montelukast (SINGULAIR) 10 MG Tab  Active   nitroglycerin (NITROSTAT) 0.4 MG SL Tab  Active   potassium chloride SA (KDUR) 20 MEQ Tab CR  Active   tiotropium (SPIRIVA HANDIHALER) 18 MCG Cap  Active   warfarin (COUMADIN) 6 MG Tab  Active                ALLERGIES  Allergies   Allergen Reactions   • Amoxicillin Rash and Itching     Tolerates cephalosporins, pt reports that she gets a rash all over her body and gets itchy   • Penicillin G Rash and Itching     pt reports that she gets a rash all over her body and gets itchy       PHYSICAL EXAM  VITAL SIGNS: /59   Pulse 81   Temp 36.1 °C (97 °F) (Temporal)   Resp 18   Ht 1.575 m (5' 2.01\")   Wt (!) 130 kg (286 lb 9.6 oz)   LMP  (LMP Unknown)   SpO2 100%   BMI 52.41 kg/m²    Constitutional: Awake and alert  HENT: Normal inspection  Eyes: Normal inspection  Neck: Grossly normal range of motion.  Cardiovascular: Normal heart rate, Normal rhythm.  Symmetric peripheral pulses.   Thorax & Lungs: No respiratory distress, No wheezing, No rales, No rhonchi, No chest tenderness.   Abdomen: Bowel sounds normal, soft, non-distended, nontender, no mass  Skin: As described below    Extremities: Edema bilateral lower extremities with erythema diffusely below the knees above the feet.  The skin is tender.  No crepitus.  No weeping or purulent drainage.  Neurologic: Grossly normal   Psychiatric: Normal for situation    RADIOLOGY/PROCEDURES  US-EXTREMITY VENOUS LOWER BILAT   Final Result       "     Imaging is interpreted by radiologist    Labs:  Results for orders placed or performed during the hospital encounter of 08/22/21   CBC WITH DIFFERENTIAL   Result Value Ref Range    WBC 10.7 4.8 - 10.8 K/uL    RBC 4.46 4.20 - 5.40 M/uL    Hemoglobin 12.3 12.0 - 16.0 g/dL    Hematocrit 40.3 37.0 - 47.0 %    MCV 90.4 81.4 - 97.8 fL    MCH 27.6 27.0 - 33.0 pg    MCHC 30.5 (L) 33.6 - 35.0 g/dL    RDW 53.7 (H) 35.9 - 50.0 fL    Platelet Count 269 164 - 446 K/uL    MPV 11.3 9.0 - 12.9 fL    Neutrophils-Polys 74.90 (H) 44.00 - 72.00 %    Lymphocytes 16.60 (L) 22.00 - 41.00 %    Monocytes 6.40 0.00 - 13.40 %    Eosinophils 1.20 0.00 - 6.90 %    Basophils 0.20 0.00 - 1.80 %    Immature Granulocytes 0.70 0.00 - 0.90 %    Nucleated RBC 0.00 /100 WBC    Neutrophils (Absolute) 7.98 (H) 2.00 - 7.15 K/uL    Lymphs (Absolute) 1.77 1.00 - 4.80 K/uL    Monos (Absolute) 0.68 0.00 - 0.85 K/uL    Eos (Absolute) 0.13 0.00 - 0.51 K/uL    Baso (Absolute) 0.02 0.00 - 0.12 K/uL    Immature Granulocytes (abs) 0.07 0.00 - 0.11 K/uL    NRBC (Absolute) 0.00 K/uL   COMP METABOLIC PANEL   Result Value Ref Range    Sodium 141 135 - 145 mmol/L    Potassium 3.8 3.6 - 5.5 mmol/L    Chloride 102 96 - 112 mmol/L    Co2 28 20 - 33 mmol/L    Anion Gap 11.0 7.0 - 16.0    Glucose 117 (H) 65 - 99 mg/dL    Bun 7 (L) 8 - 22 mg/dL    Creatinine 0.82 0.50 - 1.40 mg/dL    Calcium 9.4 8.5 - 10.5 mg/dL    AST(SGOT) 11 (L) 12 - 45 U/L    ALT(SGPT) 14 2 - 50 U/L    Alkaline Phosphatase 128 (H) 30 - 99 U/L    Total Bilirubin 0.5 0.1 - 1.5 mg/dL    Albumin 3.7 3.2 - 4.9 g/dL    Total Protein 7.1 6.0 - 8.2 g/dL    Globulin 3.4 1.9 - 3.5 g/dL    A-G Ratio 1.1 g/dL   PT/INR   Result Value Ref Range    PT 26.1 (H) 12.0 - 14.6 sec    INR 2.49 (H) 0.87 - 1.13   PROCALCITONIN   Result Value Ref Range    Procalcitonin <0.05 <0.25 ng/mL   ESTIMATED GFR   Result Value Ref Range    GFR If African American >60 >60 mL/min/1.73 m 2    GFR If Non  >60 >60  mL/min/1.73 m 2         COURSE & MEDICAL DECISION MAKING  Patient presents with bilateral lower extremity pain.  Findings most compatible with venous stasis dermatitis.  She was recently treated for cellulitis without change.  She reports this causes discomfort.  Obtain work-up.  There is no leukocytosis.  She has a normal procalcitonin level.  Does not have a DVT and her INR is therapeutic.  I have discussed venous stasis dermatitis with the patient.  Advised elevation and compression hose.  She should follow-up with her doctor this week.  I precautioned her to return to the ER for fevers, worsening symptoms, not improving or concern.    FINAL IMPRESSION  1.  Venous stasis dermatitis      This dictation was created using voice recognition software. The accuracy of the dictation is limited to the abilities of the software.  The nursing notes were reviewed and certain aspects of this information were incorporated into this note.      Electronically signed by: David Alejandra M.D., 8/22/2021 6:56 AM

## 2021-08-22 NOTE — ED NOTES
Attempted to get pt out of bed.  Pt reports pain to left leg, left side, and right foot is numb.  Pt assisted to stand on the side of the gurney with her personal walker.  Pt refusing to stand up and put any weight on her right foot.  Pt assisted back to [facility] and pt pushing self up in gurney with both legs.  ERP informed and to bedside.

## 2021-08-22 NOTE — ED NOTES
Compression stockings applied.  Pt up to W/C by self.  Discharge instructions given.  All questions answered.  Pt to follow-up with PCP, return to ER if symptoms worsen as discussed.  Pt verbalized understanding.  All belongings with pt.  Pt escorted to lobby.

## 2021-08-22 NOTE — ED TRIAGE NOTES
"Chief Complaint   Patient presents with   • Leg Pain     PT reports increasing bilateral leg pain not helped by rx meds  PT reports walking is very difficult due to leg pain.     Blood Pressure 122/68   Pulse 84   Temperature 36.1 °C (97 °F) (Temporal)   Respiration 18   Height 1.575 m (5' 2.01\")   Weight (Abnormal) 130 kg (286 lb 9.6 oz)   Last Menstrual Period  (LMP Unknown)   Oxygen Saturation 98% Comment: 2L Home o2  Body Mass Index 52.41 kg/m²     "

## 2021-08-23 ENCOUNTER — ANTICOAGULATION MONITORING (OUTPATIENT)
Dept: VASCULAR LAB | Facility: MEDICAL CENTER | Age: 46
End: 2021-08-23

## 2021-08-23 ENCOUNTER — HOME CARE VISIT (OUTPATIENT)
Dept: HOME HEALTH SERVICES | Facility: HOME HEALTHCARE | Age: 46
End: 2021-08-23
Payer: MEDICAID

## 2021-08-23 VITALS
OXYGEN SATURATION: 97 % | DIASTOLIC BLOOD PRESSURE: 60 MMHG | SYSTOLIC BLOOD PRESSURE: 112 MMHG | RESPIRATION RATE: 18 BRPM | TEMPERATURE: 98 F | HEART RATE: 74 BPM

## 2021-08-23 DIAGNOSIS — I48.0 PAROXYSMAL A-FIB (HCC): ICD-10-CM

## 2021-08-23 LAB
INR PPP: 3.2 (ref 2–3.5)
INR PPP: 3.2 (ref 2–3.5)
INR PPP: 3.2 - CRITICAL LOW: < 0.8, CRITICAL HIGH: > 6.5 (ref 2–3.5)

## 2021-08-23 PROCEDURE — G0299 HHS/HOSPICE OF RN EA 15 MIN: HCPCS

## 2021-08-23 ASSESSMENT — ENCOUNTER SYMPTOMS
HIGHEST PAIN SEVERITY IN PAST 24 HOURS: 2/10
PAIN SEVERITY GOAL: 1/10
PAIN LOCATION: BACK
LOWEST PAIN SEVERITY IN PAST 24 HOURS: 2/10
PAIN: 1
PAIN LOCATION - PAIN SEVERITY: 2/10
SUBJECTIVE PAIN PROGRESSION: UNCHANGED
MUSCLE WEAKNESS: 1
PERSON REPORTING PAIN: PATIENT
SHORTNESS OF BREATH: T

## 2021-08-24 ENCOUNTER — ANTICOAGULATION MONITORING (OUTPATIENT)
Dept: VASCULAR LAB | Facility: MEDICAL CENTER | Age: 46
End: 2021-08-24

## 2021-08-24 DIAGNOSIS — I48.0 PAROXYSMAL A-FIB (HCC): ICD-10-CM

## 2021-08-24 NOTE — PROGRESS NOTES
OP   Telephone Anticoagulation Service Note      Anticoagulation Summary  As of 8/24/2021    INR goal:  2.0-3.0   TTR:  23.0 % (4.1 mo)   INR used for dosing:  3.20 (8/23/2021)   Warfarin maintenance plan:  3 mg (6 mg x 0.5) every Mon; 6 mg (6 mg x 1) all other days   Weekly warfarin total:  39 mg   Plan last modified:  Hilda MackD (8/9/2021)   Next INR check:  8/26/2021   Target end date:  Indefinite    Indications    Paroxysmal A-fib (HCC) [I48.0]             Anticoagulation Episode Summary     INR check location:      Preferred lab:      Send INR reminders to:      Comments:  [facility]         Anticoagulation Patient Findings     Spoke with the patient on the phone today, reporting a SUPRA-therapeutic INR of 3.2.   Confirmed the current warfarin dosing regimen and patient compliance.  Patient denies any interval changes to diet and/or medications. Patient denies any signs/symptoms of bleeding or clotting.  Patient took 3mg yesterday, and will continue with 6mg until she retests her INR on Thursday.   Orders sent to [facility].     Brian StevensD

## 2021-08-26 ENCOUNTER — HOME CARE VISIT (OUTPATIENT)
Dept: HOME HEALTH SERVICES | Facility: HOME HEALTHCARE | Age: 46
End: 2021-08-26
Payer: MEDICAID

## 2021-08-26 ENCOUNTER — ANTICOAGULATION MONITORING (OUTPATIENT)
Dept: MEDICAL GROUP | Facility: PHYSICIAN GROUP | Age: 46
End: 2021-08-26

## 2021-08-26 DIAGNOSIS — I48.0 PAROXYSMAL A-FIB (HCC): ICD-10-CM

## 2021-08-26 LAB
INR PPP: 3.45 (ref 2–3.5)
INR PPP: 3.45 - CRITICAL LOW: < 0.8, CRITICAL HIGH: > 6.5 (ref 2–3.5)

## 2021-08-26 PROCEDURE — G0299 HHS/HOSPICE OF RN EA 15 MIN: HCPCS

## 2021-08-26 ASSESSMENT — ACTIVITIES OF DAILY LIVING (ADL)
AMBULATION ASSISTANCE: STAND BY ASSIST
CURRENT_FUNCTION: STAND BY ASSIST

## 2021-08-26 ASSESSMENT — ENCOUNTER SYMPTOMS
POOR JUDGMENT: 1
MUSCLE WEAKNESS: 1

## 2021-08-26 NOTE — PROGRESS NOTES
Anticoagulation Summary  As of 8/26/2021    INR goal:  2.0-3.0   TTR:  22.4 % (4.2 mo)   INR used for dosing:  3.45 (8/26/2021)   Warfarin maintenance plan:  3 mg (6 mg x 0.5) every Mon; 6 mg (6 mg x 1) all other days   Weekly warfarin total:  39 mg   Plan last modified:  Ciro Mohamud PharmD (8/9/2021)   Next INR check:  8/30/2021   Target end date:  Indefinite    Indications    Paroxysmal A-fib (HCC) [I48.0]             Anticoagulation Episode Summary     INR check location:      Preferred lab:      Send INR reminders to:      Comments:  [facility]        Anticoagulation Patient Findings          Left voicemail message to report a SUPRA therapeutic INR of 3.45.  Pt to reduce dose today then continue with current warfarin dosing regimen. Requested pt contact the clinic for any s/s of unusual bleeding, bruising, clotting or any changes to diet or medication. FU 4 days.     Ciro Mohamud, HildaD

## 2021-08-27 ENCOUNTER — HOME CARE VISIT (OUTPATIENT)
Dept: HOME HEALTH SERVICES | Facility: HOME HEALTHCARE | Age: 46
End: 2021-08-27
Payer: MEDICAID

## 2021-08-27 VITALS
TEMPERATURE: 97.7 F | RESPIRATION RATE: 20 BRPM | SYSTOLIC BLOOD PRESSURE: 102 MMHG | DIASTOLIC BLOOD PRESSURE: 56 MMHG | OXYGEN SATURATION: 94 % | HEART RATE: 83 BPM

## 2021-08-27 ASSESSMENT — ENCOUNTER SYMPTOMS
HIGHEST PAIN SEVERITY IN PAST 24 HOURS: 5/10
PAIN: 1
PAIN SEVERITY GOAL: 2/10
LOWEST PAIN SEVERITY IN PAST 24 HOURS: 5/10
SUBJECTIVE PAIN PROGRESSION: UNCHANGED
VOMITING: DENIES
NAUSEA: DENIES
PERSON REPORTING PAIN: PATIENT
SHORTNESS OF BREATH: T

## 2021-08-30 ENCOUNTER — HOME CARE VISIT (OUTPATIENT)
Dept: HOME HEALTH SERVICES | Facility: HOME HEALTHCARE | Age: 46
End: 2021-08-30
Payer: MEDICAID

## 2021-08-30 ENCOUNTER — ANTICOAGULATION MONITORING (OUTPATIENT)
Dept: MEDICAL GROUP | Facility: PHYSICIAN GROUP | Age: 46
End: 2021-08-30

## 2021-08-30 ENCOUNTER — ANTICOAGULATION MONITORING (OUTPATIENT)
Dept: VASCULAR LAB | Facility: MEDICAL CENTER | Age: 46
End: 2021-08-30

## 2021-08-30 VITALS
OXYGEN SATURATION: 94 % | RESPIRATION RATE: 18 BRPM | SYSTOLIC BLOOD PRESSURE: 110 MMHG | TEMPERATURE: 98.2 F | HEART RATE: 76 BPM | DIASTOLIC BLOOD PRESSURE: 70 MMHG

## 2021-08-30 DIAGNOSIS — I48.91 ATRIAL FIBRILLATION, UNSPECIFIED TYPE (HCC): ICD-10-CM

## 2021-08-30 DIAGNOSIS — I48.0 PAROXYSMAL A-FIB (HCC): ICD-10-CM

## 2021-08-30 DIAGNOSIS — Z79.01 CHRONIC ANTICOAGULATION: ICD-10-CM

## 2021-08-30 DIAGNOSIS — I48.0 PAROXYSMAL A-FIB (HCC): Primary | ICD-10-CM

## 2021-08-30 LAB
INR PPP: 1.5 (ref 2–3.5)
INR PPP: 1.5 - CRITICAL LOW: < 0.8, CRITICAL HIGH: > 6.5 (ref 2–3.5)

## 2021-08-30 PROCEDURE — G0299 HHS/HOSPICE OF RN EA 15 MIN: HCPCS

## 2021-08-30 RX ORDER — WARFARIN SODIUM 6 MG/1
TABLET ORAL
Qty: 90 TABLET | Refills: 1 | OUTPATIENT
Start: 2021-08-30

## 2021-08-30 RX ORDER — WARFARIN SODIUM 6 MG/1
TABLET ORAL
Qty: 90 TABLET | Refills: 1 | Status: SHIPPED | OUTPATIENT
Start: 2021-08-30 | End: 2021-10-18 | Stop reason: SDUPTHER

## 2021-08-30 SDOH — ECONOMIC STABILITY: HOUSING INSECURITY: EVIDENCE OF SMOKING MATERIAL: 0

## 2021-08-30 ASSESSMENT — ENCOUNTER SYMPTOMS
DENIES PAIN: 1
VOMITING: DENIES
MUSCLE WEAKNESS: 1
NAUSEA: DENIES

## 2021-08-30 NOTE — PROGRESS NOTES
Anticoagulation Summary  As of 2021    INR goal:  2.0-3.0   TTR:  23.3 % (4.3 mo)   INR used for dosin.50 (2021)   Warfarin maintenance plan:  3 mg (6 mg x 0.5) every Mon; 6 mg (6 mg x 1) all other days   Weekly warfarin total:  39 mg   Plan last modified:  Hilda MackD (2021)   Next INR check:  2021   Target end date:  Indefinite    Indications    Paroxysmal A-fib (HCC) [I48.0]             Anticoagulation Episode Summary     INR check location:      Preferred lab:      Send INR reminders to:      Comments:  [facility]           Refer to Anticoagulation Patient Findings for HPI  Patient Findings     Negatives:  Signs/symptoms of thrombosis, Signs/symptoms of bleeding, Laboratory test error suspected, Change in health, Change in alcohol use, Change in activity, Upcoming invasive procedure, Emergency department visit, Upcoming dental procedure, Missed doses, Extra doses, Change in medications, Change in diet/appetite, Hospital admission, Bruising, Other complaints          Spoke with pt via phone.  INR is SUB-therapeutic.     Pt verifies warfarin weekly dosing.     Pt is NOT on antiplatelet therapy     Will have pt BOLUS 1x to 9mg, then continue with 6mg daily until next INR    Repeat INR in 3 days via [facility]    Carlos Barros, HildaD

## 2021-08-31 NOTE — PROGRESS NOTES
OP Telephone Anticoagulation Service Note    Date: 2021      Anticoagulation Summary  As of 2021    INR goal:  2.0-3.0   TTR:  23.3 % (4.3 mo)   INR used for dosin.50 (2021)   Warfarin maintenance plan:  3 mg (6 mg x 0.5) every Mon; 6 mg (6 mg x 1) all other days   Weekly warfarin total:  39 mg   Plan last modified:  Ciro Mohamud PharmD (2021)   Next INR check:  2021   Target end date:  Indefinite    Indications    Paroxysmal A-fib (HCC) [I48.0]             Anticoagulation Episode Summary     INR check location:      Preferred lab:      Send INR reminders to:      Comments:  [facility]         Anticoagulation Patient Findings      INR SUB-therapeutic at 1.5.  Spoke w/ pt on phone.  Verified regimen w/ pt.  Instructed pt to bolus x 1 dose w/ 6 mg and to then continue on w/ her current regimen.  NO s/s bleeding reported per pt.  NO changes in diet reported per pt.  NO changes in medications reported per pt.  Check INR in 2 day(s).  Instructed pt to call clinic at [phone] if there are any questions.  Pt stated understanding.    Orders faxed to JOANN Kang PharmD

## 2021-09-02 ENCOUNTER — HOME CARE VISIT (OUTPATIENT)
Dept: HOME HEALTH SERVICES | Facility: HOME HEALTHCARE | Age: 46
End: 2021-09-02
Payer: MEDICAID

## 2021-09-02 DIAGNOSIS — I48.0 PAROXYSMAL A-FIB (HCC): ICD-10-CM

## 2021-09-02 NOTE — PROGRESS NOTES
INR  Received: Today  Tammy J Tarun, R.N.  P Amb Anticoag Pool  Hello,   This patient was supposed to have an INR today, but was at a doctor's appt when a nurse went to her home. Can we get a new order for tomorrow?   Thanks     Sent orders for [facility] to check INR on 9/3.

## 2021-09-03 ENCOUNTER — HOME CARE VISIT (OUTPATIENT)
Dept: HOME HEALTH SERVICES | Facility: HOME HEALTHCARE | Age: 46
End: 2021-09-03
Payer: MEDICAID

## 2021-09-03 ENCOUNTER — ANTICOAGULATION MONITORING (OUTPATIENT)
Dept: VASCULAR LAB | Facility: MEDICAL CENTER | Age: 46
End: 2021-09-03

## 2021-09-03 VITALS
OXYGEN SATURATION: 97 % | DIASTOLIC BLOOD PRESSURE: 64 MMHG | RESPIRATION RATE: 20 BRPM | SYSTOLIC BLOOD PRESSURE: 100 MMHG | HEART RATE: 82 BPM | TEMPERATURE: 97.5 F

## 2021-09-03 DIAGNOSIS — I48.0 PAROXYSMAL A-FIB (HCC): ICD-10-CM

## 2021-09-03 LAB
INR PPP: 2.5 (ref 2–3.5)
INR PPP: 2.5 - CRITICAL LOW: < 0.8, CRITICAL HIGH: > 6.5 (ref 2–3.5)

## 2021-09-03 PROCEDURE — G0495 RN CARE TRAIN/EDU IN HH: HCPCS

## 2021-09-03 ASSESSMENT — ENCOUNTER SYMPTOMS
THOUGHT CONTENT - SUSPICIOUS: 1
MUSCLE WEAKNESS: 1
SHORTNESS OF BREATH: T
PAIN LOCATION - PAIN QUALITY: ACHY
PAIN: 1
PAIN LOCATION - PAIN SEVERITY: 1/10
PERSON REPORTING PAIN: PATIENT
SUBJECTIVE PAIN PROGRESSION: WAXING AND WANING
LOWEST PAIN SEVERITY IN PAST 24 HOURS: 1/10
PAIN LOCATION - PAIN FREQUENCY: INTERMITTENT
POOR JUDGMENT: 1
PAIN LOCATION - PAIN DURATION: SHORT
PAIN LOCATION: RIGHT LEG
PAIN LOCATION - PAIN FREQUENCY: INTERMITTENT
PAIN LOCATION - PAIN DURATION: SHORT
PAIN SEVERITY GOAL: 1/10
PAIN LOCATION - RELIEVING FACTORS: ELEVATING LEGS
PAIN LOCATION - PAIN QUALITY: ACHY
HIGHEST PAIN SEVERITY IN PAST 24 HOURS: 5/10
VOMITING: PT DENIES
DEPRESSED MOOD: 1
PAIN LOCATION: LEFT LEG
PAIN LOCATION - PAIN SEVERITY: 1/10
PAIN LOCATION - RELIEVING FACTORS: ELEVATING LEGS
PAIN LOCATION - EXACERBATING FACTORS: EDEMA
PAIN LOCATION - EXACERBATING FACTORS: EDEMA
NAUSEA: PT DENIES

## 2021-09-03 ASSESSMENT — ACTIVITIES OF DAILY LIVING (ADL): TRANSPORTATION COMMENTS: PT NEEDS ASSISTANCE TO LEAVE HOME.

## 2021-09-04 NOTE — PROGRESS NOTES
OP Anticoagulation Service Note    Date: 9/3/2021    Anticoagulation Summary  As of 9/3/2021    INR goal:  2.0-3.0   TTR:  24.1 % (4.4 mo)   INR used for dosin.50 (9/3/2021)   Warfarin maintenance plan:  6 mg (6 mg x 1) every day   Weekly warfarin total:  42 mg   Plan last modified:  Shaw Read, PharmD (9/3/2021)   Next INR check:  2021   Target end date:  Indefinite    Indications    Paroxysmal A-fib (HCC) [I48.0]             Anticoagulation Episode Summary     INR check location:      Preferred lab:      Send INR reminders to:      Comments:  [facility]        Anticoagulation Patient Findings      Voice message for patient regarding their anticoagulant.     HPI:   The reason for today's call is to prevent morbidity and mortality from a blood clot and/or stroke and to reduce the risk of bleeding while on a anticoagulant.     PCP:  HSANKAR Gleason  [address] [place] [id number]    Assessment:     • INR  therapeutic.       Current Outpatient Medications:   •  traZODone,  mg, Oral, DAILY  •  warfarin, Take one-half tablet (3mg) on , and 1 tablet (6mg) all other days or as directed by the [facility]  •  furosemide, 20 mg, Oral, BID  •  diclofenac sodium, 1 g, Apply externally, TID PRN  •  Home Care Oxygen, 2 L/min, Inhalation, Continuous  •  fluticasone, 1-2 Spray, Nasal, BID  •  famotidine, 40 mg, Oral, DAILY  •  nitroglycerin, 0.4 mg, Sublingual, PRN  •  albuterol, 2 Puff, Inhalation, Q6HRS PRN  •  acetaminophen, 500 mg, Oral, Q6HRS PRN  •  potassium chloride SA, 20 mEq, Oral, DAILY  •  montelukast, 10 mg, Oral, QHS  •  atorvastatin, 20 mg, Oral, DAILY  •  Spiriva HandiHaler, 18 mcg, Inhalation, DAILY  •  aripiprazole, 30 mg, Oral, QAM      Plan:     • Continue the same warfarin dose, as noted above.       Follow-up:     • Our protocol suggests we test in 1 weeks.        Additional information discussed with patient:     • Asked patient to please call the  anticoagulation clinic if they have any signs/symptoms of bleeding and/or thrombosis or any changes to diet or medications.      National recommendations regarding anticoagulation therapy:     The CHEST guidelines recommends frequent INR monitoring at regular intervals (a few days up to a max of 12 weeks) to ensure patients are on the proper dose of warfarin, and patients are not having any complications from therapy.  INRs can dramatically change over a short time period due to diet, medications, and medical conditions.       Shaw Read, PharmD, MS, BCACP, C  [facility]  Phone [phone] fax [phone]    This note was created using voice recognition software (Dragon). The accuracy of the dictation is limited by the abilities of the software. I have reviewed the note prior to signing, however some errors in grammar and context are still possible. If you have any questions related to this note please do not hesitate to contact our office.

## 2021-09-05 ENCOUNTER — HOME CARE VISIT (OUTPATIENT)
Dept: HOME HEALTH SERVICES | Facility: HOME HEALTHCARE | Age: 46
End: 2021-09-05
Payer: MEDICAID

## 2021-09-05 ENCOUNTER — HOSPITAL ENCOUNTER (EMERGENCY)
Facility: MEDICAL CENTER | Age: 46
End: 2021-09-05
Attending: EMERGENCY MEDICINE
Payer: MEDICAID

## 2021-09-05 ENCOUNTER — APPOINTMENT (OUTPATIENT)
Dept: RADIOLOGY | Facility: MEDICAL CENTER | Age: 46
End: 2021-09-05
Attending: EMERGENCY MEDICINE
Payer: MEDICAID

## 2021-09-05 VITALS
TEMPERATURE: 96.9 F | HEART RATE: 76 BPM | OXYGEN SATURATION: 97 % | HEIGHT: 62 IN | BODY MASS INDEX: 53.37 KG/M2 | SYSTOLIC BLOOD PRESSURE: 119 MMHG | RESPIRATION RATE: 15 BRPM | DIASTOLIC BLOOD PRESSURE: 58 MMHG | WEIGHT: 290 LBS

## 2021-09-05 DIAGNOSIS — R53.1 GENERALIZED WEAKNESS: ICD-10-CM

## 2021-09-05 DIAGNOSIS — R10.32 LEFT LOWER QUADRANT ABDOMINAL PAIN: ICD-10-CM

## 2021-09-05 DIAGNOSIS — R20.2 PARESTHESIA: ICD-10-CM

## 2021-09-05 DIAGNOSIS — E86.0 DEHYDRATION: ICD-10-CM

## 2021-09-05 DIAGNOSIS — R59.0 CERVICAL LYMPHADENOPATHY: ICD-10-CM

## 2021-09-05 LAB
ALBUMIN SERPL BCP-MCNC: 3.5 G/DL (ref 3.2–4.9)
ALBUMIN/GLOB SERPL: 1.1 G/DL
ALP SERPL-CCNC: 111 U/L (ref 30–99)
ALT SERPL-CCNC: 9 U/L (ref 2–50)
ANION GAP SERPL CALC-SCNC: 11 MMOL/L (ref 7–16)
APPEARANCE UR: CLEAR
APTT PPP: 54.5 SEC (ref 24.7–36)
AST SERPL-CCNC: 9 U/L (ref 12–45)
BASOPHILS # BLD AUTO: 0.1 % (ref 0–1.8)
BASOPHILS # BLD: 0.01 K/UL (ref 0–0.12)
BILIRUB SERPL-MCNC: 0.4 MG/DL (ref 0.1–1.5)
BILIRUB UR QL STRIP.AUTO: NEGATIVE
BUN SERPL-MCNC: 9 MG/DL (ref 8–22)
CALCIUM SERPL-MCNC: 9.1 MG/DL (ref 8.5–10.5)
CHLORIDE SERPL-SCNC: 102 MMOL/L (ref 96–112)
CK SERPL-CCNC: 63 U/L (ref 0–154)
CO2 SERPL-SCNC: 26 MMOL/L (ref 20–33)
COLOR UR: YELLOW
CREAT SERPL-MCNC: 0.75 MG/DL (ref 0.5–1.4)
EOSINOPHIL # BLD AUTO: 0.06 K/UL (ref 0–0.51)
EOSINOPHIL NFR BLD: 0.6 % (ref 0–6.9)
ERYTHROCYTE [DISTWIDTH] IN BLOOD BY AUTOMATED COUNT: 54.2 FL (ref 35.9–50)
GLOBULIN SER CALC-MCNC: 3.3 G/DL (ref 1.9–3.5)
GLUCOSE SERPL-MCNC: 108 MG/DL (ref 65–99)
GLUCOSE UR STRIP.AUTO-MCNC: NEGATIVE MG/DL
HCT VFR BLD AUTO: 36.7 % (ref 37–47)
HGB BLD-MCNC: 11.2 G/DL (ref 12–16)
IMM GRANULOCYTES # BLD AUTO: 0.06 K/UL (ref 0–0.11)
IMM GRANULOCYTES NFR BLD AUTO: 0.6 % (ref 0–0.9)
INR PPP: 2.92 (ref 0.87–1.13)
KETONES UR STRIP.AUTO-MCNC: NEGATIVE MG/DL
LACTATE BLD-SCNC: 1.7 MMOL/L (ref 0.5–2)
LEUKOCYTE ESTERASE UR QL STRIP.AUTO: NEGATIVE
LIPASE SERPL-CCNC: 21 U/L (ref 11–82)
LYMPHOCYTES # BLD AUTO: 1.39 K/UL (ref 1–4.8)
LYMPHOCYTES NFR BLD: 13.7 % (ref 22–41)
MCH RBC QN AUTO: 27.8 PG (ref 27–33)
MCHC RBC AUTO-ENTMCNC: 30.5 G/DL (ref 33.6–35)
MCV RBC AUTO: 91.1 FL (ref 81.4–97.8)
MICRO URNS: ABNORMAL
MONOCYTES # BLD AUTO: 0.7 K/UL (ref 0–0.85)
MONOCYTES NFR BLD AUTO: 6.9 % (ref 0–13.4)
NEUTROPHILS # BLD AUTO: 7.96 K/UL (ref 2–7.15)
NEUTROPHILS NFR BLD: 78.1 % (ref 44–72)
NITRITE UR QL STRIP.AUTO: NEGATIVE
NRBC # BLD AUTO: 0 K/UL
NRBC BLD-RTO: 0 /100 WBC
PH UR STRIP.AUTO: 6.5 [PH] (ref 5–8)
PLATELET # BLD AUTO: 271 K/UL (ref 164–446)
PMV BLD AUTO: 10.9 FL (ref 9–12.9)
POTASSIUM SERPL-SCNC: 3.8 MMOL/L (ref 3.6–5.5)
PROT SERPL-MCNC: 6.8 G/DL (ref 6–8.2)
PROT UR QL STRIP: NEGATIVE MG/DL
PROTHROMBIN TIME: 29.6 SEC (ref 12–14.6)
RBC # BLD AUTO: 4.03 M/UL (ref 4.2–5.4)
RBC UR QL AUTO: NEGATIVE
S PYO DNA SPEC NAA+PROBE: NOT DETECTED
SODIUM SERPL-SCNC: 139 MMOL/L (ref 135–145)
SP GR UR STRIP.AUTO: >=1.045
TROPONIN T SERPL-MCNC: 22 NG/L (ref 6–19)
TROPONIN T SERPL-MCNC: 30 NG/L (ref 6–19)
UROBILINOGEN UR STRIP.AUTO-MCNC: 1 MG/DL
WBC # BLD AUTO: 10.2 K/UL (ref 4.8–10.8)

## 2021-09-05 PROCEDURE — 96375 TX/PRO/DX INJ NEW DRUG ADDON: CPT

## 2021-09-05 PROCEDURE — 83690 ASSAY OF LIPASE: CPT

## 2021-09-05 PROCEDURE — 81003 URINALYSIS AUTO W/O SCOPE: CPT

## 2021-09-05 PROCEDURE — 85730 THROMBOPLASTIN TIME PARTIAL: CPT

## 2021-09-05 PROCEDURE — 96374 THER/PROPH/DIAG INJ IV PUSH: CPT

## 2021-09-05 PROCEDURE — 70496 CT ANGIOGRAPHY HEAD: CPT

## 2021-09-05 PROCEDURE — 0042T CT-CEREBRAL PERFUSION ANALYSIS: CPT

## 2021-09-05 PROCEDURE — 74176 CT ABD & PELVIS W/O CONTRAST: CPT

## 2021-09-05 PROCEDURE — 99285 EMERGENCY DEPT VISIT HI MDM: CPT

## 2021-09-05 PROCEDURE — 84484 ASSAY OF TROPONIN QUANT: CPT

## 2021-09-05 PROCEDURE — 80053 COMPREHEN METABOLIC PANEL: CPT

## 2021-09-05 PROCEDURE — 82550 ASSAY OF CK (CPK): CPT

## 2021-09-05 PROCEDURE — 99284 EMERGENCY DEPT VISIT MOD MDM: CPT | Performed by: PSYCHIATRY & NEUROLOGY

## 2021-09-05 PROCEDURE — 700117 HCHG RX CONTRAST REV CODE 255: Performed by: EMERGENCY MEDICINE

## 2021-09-05 PROCEDURE — 94760 N-INVAS EAR/PLS OXIMETRY 1: CPT

## 2021-09-05 PROCEDURE — 700111 HCHG RX REV CODE 636 W/ 250 OVERRIDE (IP): Performed by: EMERGENCY MEDICINE

## 2021-09-05 PROCEDURE — 83605 ASSAY OF LACTIC ACID: CPT

## 2021-09-05 PROCEDURE — 70498 CT ANGIOGRAPHY NECK: CPT

## 2021-09-05 PROCEDURE — 700105 HCHG RX REV CODE 258: Performed by: EMERGENCY MEDICINE

## 2021-09-05 PROCEDURE — 70450 CT HEAD/BRAIN W/O DYE: CPT

## 2021-09-05 PROCEDURE — 85610 PROTHROMBIN TIME: CPT

## 2021-09-05 PROCEDURE — 85025 COMPLETE CBC W/AUTO DIFF WBC: CPT

## 2021-09-05 PROCEDURE — 87651 STREP A DNA AMP PROBE: CPT

## 2021-09-05 PROCEDURE — 93005 ELECTROCARDIOGRAM TRACING: CPT | Performed by: EMERGENCY MEDICINE

## 2021-09-05 RX ORDER — SODIUM CHLORIDE 9 MG/ML
1000 INJECTION, SOLUTION INTRAVENOUS ONCE
Status: COMPLETED | OUTPATIENT
Start: 2021-09-05 | End: 2021-09-05

## 2021-09-05 RX ORDER — ONDANSETRON 4 MG/1
4 TABLET, ORALLY DISINTEGRATING ORAL EVERY 6 HOURS PRN
Qty: 10 TABLET | Refills: 0 | Status: ON HOLD | OUTPATIENT
Start: 2021-09-05 | End: 2021-10-13

## 2021-09-05 RX ORDER — CLINDAMYCIN HYDROCHLORIDE 300 MG/1
300 CAPSULE ORAL 3 TIMES DAILY
Qty: 30 CAPSULE | Refills: 0 | Status: SHIPPED | OUTPATIENT
Start: 2021-09-05 | End: 2021-09-15

## 2021-09-05 RX ORDER — ONDANSETRON 2 MG/ML
4 INJECTION INTRAMUSCULAR; INTRAVENOUS ONCE
Status: COMPLETED | OUTPATIENT
Start: 2021-09-05 | End: 2021-09-05

## 2021-09-05 RX ADMIN — FAMOTIDINE 20 MG: 10 INJECTION INTRAVENOUS at 19:56

## 2021-09-05 RX ADMIN — ONDANSETRON 4 MG: 2 INJECTION INTRAMUSCULAR; INTRAVENOUS at 19:56

## 2021-09-05 RX ADMIN — SODIUM CHLORIDE 1000 ML: 9 INJECTION, SOLUTION INTRAVENOUS at 19:56

## 2021-09-05 RX ADMIN — IOHEXOL 120 ML: 350 INJECTION, SOLUTION INTRAVENOUS at 18:20

## 2021-09-05 ASSESSMENT — FIBROSIS 4 INDEX: FIB4 SCORE: 0.52

## 2021-09-05 NOTE — CASE COMMUNICATION
Pt called at 0010 to report she has not felt good for past 2 days, tired, not sleeping. Was short of breath earlier. Kept talking about warfarin. Instructed to go to ER for eval. Partial understanding voiced as she stated she might wait until morning

## 2021-09-06 ENCOUNTER — HOME CARE VISIT (OUTPATIENT)
Dept: HOME HEALTH SERVICES | Facility: HOME HEALTHCARE | Age: 46
End: 2021-09-06
Payer: MEDICAID

## 2021-09-06 NOTE — ED PROVIDER NOTES
"ED Provider Note    ED Provider Note    Scribed for Kirsty Perkins MD by Kirsty Perkins M.D.. 9/5/2021, 6:03 PM.    Primary care provider: SHANKAR Gleason  Means of arrival: EMS  History obtained from: Patient and EMS  History limited by: None    CHIEF COMPLAINT  Chief Complaint   Patient presents with   • Possible Stroke     onset around noon, with left side weakness and (B)LE numbness/ tingling       HPI  Lorene Haro is a 46 y.o. female who presents to the Emergency Department for evaluation of potential strokelike symptoms.  Per EMS patient was last normal at noon.  Patient relates since then she has noted generalized weakness to both of her legs, and sensation of numbness involving both legs and the left arm.  Patient is anticoagulated on warfarin for history of atrial fibrillation.  She denies a fever, denies any acute pain, denies any vomiting.  No previous history of stroke though she does note vascular disease history in her parents.  On reassessment after emergent CT, patient initially endorses about 2 weeks of left flank/left lower quadrant discomfort, painful swallowing and \"swollen glands\", and very poor oral intake for the last 2 to 3 days, nausea, but no sherin vomiting.    REVIEW OF SYSTEMS  Pertinent positives include weakness to both legs, paresthesia to both legs and the left arm. Pertinent negatives include no acute trauma, no fever.  All other systems reviewed and negative.    PAST MEDICAL HISTORY   has a past medical history of Asthma, Bipolar 2 disorder (HCC), Chickenpox, Chronic obstructive pulmonary disease (HCC), Hypertension, On home oxygen therapy, Other psychological stress, Schizophrenic disorder (HCC), and Yeast infection of the skin (4/1/2021).    SURGICAL HISTORY   has a past surgical history that includes hysterectomy laparoscopy; colectomy; cholecystectomy; and knee arthroscopy (Left).    SOCIAL HISTORY  Social History     Tobacco Use   • Smoking status: " "Former Smoker     Packs/day: 1.00     Years: 20.00     Pack years: 20.00     Types: Cigarettes     Quit date: 2021     Years since quittin.0   • Smokeless tobacco: Never Used   Vaping Use   • Vaping Use: Never used   Substance Use Topics   • Alcohol use: No   • Drug use: No      Social History     Substance and Sexual Activity   Drug Use No       FAMILY HISTORY  Family History   Problem Relation Age of Onset   • No Known Problems Mother    • Cancer Sister    CVA history in parent    CURRENT MEDICATIONS  Home Medications     Reviewed by Molly Kitchen R.N. (Registered Nurse) on 21 at 1820  Med List Status: Partial   Medication Last Dose Status   acetaminophen (TYLENOL) 500 MG Tab  Active   albuterol 108 (90 Base) MCG/ACT Aero Soln inhalation aerosol  Active   aripiprazole (ABILIFY) 30 MG tablet  Active   atorvastatin (LIPITOR) 20 MG Tab  Active   diclofenac sodium 3 % Gel  Active   famotidine (PEPCID) 40 MG Tab  Active   fluticasone (FLONASE) 50 MCG/ACT nasal spray  Active   furosemide (LASIX) 20 MG Tab  Active   Home Care Oxygen  Active   montelukast (SINGULAIR) 10 MG Tab  Active   nitroglycerin (NITROSTAT) 0.4 MG SL Tab  Active   potassium chloride SA (KDUR) 20 MEQ Tab CR  Active   tiotropium (SPIRIVA HANDIHALER) 18 MCG Cap  Active   traZODone (DESYREL) 50 MG Tab  Active   warfarin (COUMADIN) 6 MG Tab  Active                ALLERGIES  Allergies   Allergen Reactions   • Amoxicillin Rash and Itching     Tolerates cephalosporins, pt reports that she gets a rash all over her body and gets itchy   • Penicillin G Rash and Itching     pt reports that she gets a rash all over her body and gets itchy       PHYSICAL EXAM  VITAL SIGNS: /58   Pulse 76   Temp 36.1 °C (96.9 °F) (Temporal)   Resp 15   Ht 1.575 m (5' 2\")   Wt (!) 132 kg (290 lb)   LMP  (LMP Unknown)   SpO2 97%   BMI 53.04 kg/m²     General: Alert, no acute distress  Skin: Warm, dry, normal for ethnicity  Head: Normocephalic, " atraumatic  Neck: Trachea midline  Eye: PERRL, normal conjunctiva, extraocular movements intact  ENMT: Oral mucosa moist, no pharyngeal erythema or exudate  Cardiovascular: Regular rate and rhythm,  Normal peripheral perfusion  Respiratory: respirations are non-labored, breath sounds are equal  Gastrointestinal: Soft, nontender, non distended  Musculoskeletal: No swelling, no deformity  Neurological: Alert and oriented to person, place, time, and situation.  Cranial nerves II through XII are grossly intact.  Patient demonstrates bilateral pronator drift to the upper extremities, pronator drift noted as well to the left lower extremity.  She has no pronator drift on the right.  Otherwise  strength is symmetrical, she notes a dense paresthesia to pain to the lower extremities, decreased sensation to the left upper extremity, and intact sensation of the face.  No sherin ataxia, no extinction, NIH stroke scale of 4  Lymphatics: Anterior cervical lymphadenopathy  Psychiatric: Cooperative, mildly anxious, otherwise appropriate mood & affect      DIAGNOSTIC STUDIES/PROCEDURES    LABS  Results for orders placed or performed during the hospital encounter of 09/05/21   CBC WITH DIFFERENTIAL   Result Value Ref Range    WBC 10.2 4.8 - 10.8 K/uL    RBC 4.03 (L) 4.20 - 5.40 M/uL    Hemoglobin 11.2 (L) 12.0 - 16.0 g/dL    Hematocrit 36.7 (L) 37.0 - 47.0 %    MCV 91.1 81.4 - 97.8 fL    MCH 27.8 27.0 - 33.0 pg    MCHC 30.5 (L) 33.6 - 35.0 g/dL    RDW 54.2 (H) 35.9 - 50.0 fL    Platelet Count 271 164 - 446 K/uL    MPV 10.9 9.0 - 12.9 fL    Neutrophils-Polys 78.10 (H) 44.00 - 72.00 %    Lymphocytes 13.70 (L) 22.00 - 41.00 %    Monocytes 6.90 0.00 - 13.40 %    Eosinophils 0.60 0.00 - 6.90 %    Basophils 0.10 0.00 - 1.80 %    Immature Granulocytes 0.60 0.00 - 0.90 %    Nucleated RBC 0.00 /100 WBC    Neutrophils (Absolute) 7.96 (H) 2.00 - 7.15 K/uL    Lymphs (Absolute) 1.39 1.00 - 4.80 K/uL    Monos (Absolute) 0.70 0.00 - 0.85 K/uL     Eos (Absolute) 0.06 0.00 - 0.51 K/uL    Baso (Absolute) 0.01 0.00 - 0.12 K/uL    Immature Granulocytes (abs) 0.06 0.00 - 0.11 K/uL    NRBC (Absolute) 0.00 K/uL   COMP METABOLIC PANEL   Result Value Ref Range    Sodium 139 135 - 145 mmol/L    Potassium 3.8 3.6 - 5.5 mmol/L    Chloride 102 96 - 112 mmol/L    Co2 26 20 - 33 mmol/L    Anion Gap 11.0 7.0 - 16.0    Glucose 108 (H) 65 - 99 mg/dL    Bun 9 8 - 22 mg/dL    Creatinine 0.75 0.50 - 1.40 mg/dL    Calcium 9.1 8.5 - 10.5 mg/dL    AST(SGOT) 9 (L) 12 - 45 U/L    ALT(SGPT) 9 2 - 50 U/L    Alkaline Phosphatase 111 (H) 30 - 99 U/L    Total Bilirubin 0.4 0.1 - 1.5 mg/dL    Albumin 3.5 3.2 - 4.9 g/dL    Total Protein 6.8 6.0 - 8.2 g/dL    Globulin 3.3 1.9 - 3.5 g/dL    A-G Ratio 1.1 g/dL   TROPONIN   Result Value Ref Range    Troponin T 30 (H) 6 - 19 ng/L   LACTIC ACID   Result Value Ref Range    Lactic Acid 1.7 0.5 - 2.0 mmol/L   APTT   Result Value Ref Range    APTT 54.5 (H) 24.7 - 36.0 sec   URINALYSIS CULTURE, IF INDICATED    Specimen: Urine   Result Value Ref Range    Color Yellow     Character Clear     Specific Gravity >=1.045 (A) <1.035    Ph 6.5 5.0 - 8.0    Glucose Negative Negative mg/dL    Ketones Negative Negative mg/dL    Protein Negative Negative mg/dL    Bilirubin Negative Negative    Urobilinogen, Urine 1.0 Negative    Nitrite Negative Negative    Leukocyte Esterase Negative Negative    Occult Blood Negative Negative    Micro Urine Req see below    Prothrombin Time   Result Value Ref Range    PT 29.6 (H) 12.0 - 14.6 sec    INR 2.92 (H) 0.87 - 1.13   Group A Strep by PCR    Specimen: Throat   Result Value Ref Range    Group A Strep by PCR Not Detected Not Detected   CREATINE KINASE   Result Value Ref Range    CPK Total 63 0 - 154 U/L   LIPASE   Result Value Ref Range    Lipase 21 11 - 82 U/L   ESTIMATED GFR   Result Value Ref Range    GFR If African American >60 >60 mL/min/1.73 m 2    GFR If Non African American >60 >60 mL/min/1.73 m 2   TROPONIN   Result  Value Ref Range    Troponin T 22 (H) 6 - 19 ng/L   EKG (NOW)   Result Value Ref Range    Report       [facility] Emergency Dept.    Test Date:  2021  Pt Name:    ADELINA MILLAN                Department: ER  MRN:        [id number]                      Room:        07  Gender:     Female                       Technician: [id number]  :        [id number]                   Requested By:TRAV WALTON  Order #:    [id number]                    Reading MD:    Measurements  Intervals                                Axis  Rate:       79                           P:          61  WY:         148                          QRS:        66  QRSD:       96                           T:          34  QT:         376  QTc:        432    Interpretive Statements  SINUS RHYTHM  Compared to ECG 2021 12:01:26  ST (T wave) deviation no longer present  Right ventricular hypertrophy no longer present       All labs reviewed by me.    EKG  12 Lead EKG obtained at 1800 and interpreted by me to show:  Rhythm: Normal sinus rhythm   Rate: 79  Axis: Normal  Intervals: Normal  Q Waves: Normal  No diagnostic ST segment elevation    Clinical Impression: Normal EKG  Compared to no significant change from 2021    RADIOLOGY  CT-RENAL COLIC EVALUATION(A/P W/O)   Final Result      1.  Hepatic steatosis and marked hepatomegaly      2.  Prior cholecystectomy. No biliary dilation.      3.  Moderate splenomegaly      4.  Contrast within the renal collecting systems and therefore renal calculi are not excluded. However, there is no hydronephrosis.      CT-CTA NECK WITH & W/O-POST PROCESSING   Final Result      CT angiogram of the neck within normal limits.      CT-CEREBRAL PERFUSION ANALYSIS   Final Result      1.  Cerebral blood flow less than 30% likely representing completed infarct = 0 mL.      2.  T Max more than 6 seconds likely representing combination of completed infarct and ischemia = 0 mL.      3.  Mismatched  volume likely representing ischemic brain/penumbra = None      4.  Please note that the cerebral perfusion was performed on the limited brain tissue around the basal ganglia region. Infarct/ischemia outside the CT perfusion sections can be missed in this study.      CT-CTA HEAD WITH & W/O-POST PROCESS   Final Result      CT angiogram of the [place] within normal limits.      CT-HEAD W/O   Final Result         NO ACUTE ABNORMALITIES ARE NOTED ON CT SCAN OF THE HEAD.           The radiologist's interpretation of all radiological studies have been reviewed by me.    COURSE & MEDICAL DECISION MAKING  Pertinent Labs & Imaging studies reviewed. (See chart for details)    6:03 PM - Patient seen and examined at bedside. Ordered metabolic and CVA work-up to evaluate her symptoms. The differential diagnoses include but are not limited to: CVA, TIA, intracranial hemorrhage, metabolic derangement    1850: Patient reassessed, relieved to hear of unremarkable studies with regard to stroke work-up.  Neurologist notes no evidence suggestive of CVA at this time.  On my reassessment patient notes painful swallowing and poor oral intake, as such have initiated IV fluids.  Will check for strep pharyngitis.  Patient also notes pain to the left flank for the last week, waxing and waning.  She is tender, urine thankfully is unremarkable no evidence of UTI, will consider obstructive uropathy and as such CT of the abdomen will be obtained.    2200: Patient updated with unremarkable work-up thus far.  Awaiting CT report at this time.  I spoke with the film room who note that the abdominal CT is next in the queue to be read.  Have ordered a repeat troponin given initial is mildly elevated.    2300: Patient passes p.o. challenge, troponin is unremarkable, actually improved from previous.  At this point, no indication for inpatient management, patient comfortable with plan.    Patient Vitals for the past 24 hrs:   BP Temp Temp src  "Pulse Resp SpO2 Height Weight   09/05/21 2300 119/58 36.1 °C (96.9 °F) Temporal 76 15 97 % -- --   09/05/21 2200 118/57 -- -- 76 (!) 22 99 % -- --   09/05/21 2000 114/56 -- -- 75 20 93 % -- --   09/05/21 1836 135/65 -- -- 82 18 97 % -- --   09/05/21 1818 110/47 36 °C (96.8 °F) Temporal 84 20 96 % -- --   09/05/21 1817 -- -- -- -- -- -- 1.575 m (5' 2\") (!) 132 kg (290 lb)         HYDRATION: Based on the patient's presentation of Dehydration the patient was given IV fluids. IV Hydration was used because oral hydration failed due to Initially n.p.o. pending imaging. Upon recheck following hydration, the patient was Better, skin tone improving, blood pressure improving, currently 119/58.    Decision Making:  This is a 46 y.o. year old female who presents with sensation of weakness and numbness to both legs and decrease sensation to her left arm.  Patient does have mildly bilateral pronator drift of the upper extremity and unilateral pronator drift to the left lower extremity.  Not classic for CVA given there is bilateral involvement but certainly that is high on the differential.  Patient does have significant risk factors including dysrhythmia and family history of stroke.  As such stat CT imaging will be obtained, Dr. Rapp from neurology service also consulted on the case.  Thankfully after thorough evaluation by myself and the neurologist as well as appropriate imaging there is no evidence of acute CVA.  No indication for alteplase.  Patient notes poor oral intake, throat discomfort, and also pain to the left side of her abdomen on my reassessment.  I suspect perhaps her generalized weakness from dehydration could be a factor in some of her initial complaints given the bilateral nature of the exam is certainly not typical for CVA.  He has no evidence of intercranial hemorrhage.  Patient is feeling better with IV fluids.  She does have cervical adenopathy on the exam, she is otherwise maintaining her airway and has a " clear chest x-ray with no evidence of increased work of breathing, will cover with appropriate antibiotics.  She has no actual chest discomfort, the pain is the abdomen on the left, EKG is nonischemic, initial troponin mildly out of range, repeat is actually improved.  Doubt ACS, heart score is three, low risk ratification.    The patient will return for new or worsening symptoms and is stable at the time of discharge.      DISPOSITION:  Patient will be discharged home in stable condition.    FOLLOW UP:  [address] [address] [phone]  Schedule an appointment as soon as possible for a visit         OUTPATIENT MEDICATIONS:  Discharge Medication List as of 9/5/2021 11:33 PM      START taking these medications    Details   clindamycin (CLEOCIN) 300 MG Cap Take 1 Capsule by mouth 3 times a day for 10 days., Disp-30 Capsule, R-0, Normal      ondansetron (ZOFRAN ODT) 4 MG TABLET DISPERSIBLE Take 1 Tablet by mouth every 6 hours as needed for Nausea., Disp-10 Tablet, R-0, Normal               FINAL IMPRESSION  1. Paresthesia    2. Cervical lymphadenopathy    3. Left lower quadrant abdominal pain    4. Dehydration    5. Generalized weakness          Kirsty HARRIS M.D. (Scribe), am scribing for, and in the presence of, Kirsty Perkins MD.    Electronically signed by: Kirsty Perkins M.D. (Scribe), 9/5/2021    Kirsty HARRIS MD personally performed the services described in this documentation, as scribed by Kirsty Perkins M.D. in my presence, and it is both accurate and complete    The note accurately reflects work and decisions made by me.  Kirsty Perkins M.D.  9/6/2021  12:13 AM

## 2021-09-06 NOTE — CONSULTS
Neurology STROKE CODE H&P  Neurohospitalist Service, [facility]    Referring Physician: NIEVES Page*    STROKE CODE: L side weakness and paresthesias    To obtain the most accurate data regarding the time called, and time patient seen, refer to the stroke run-sheet and chart.  For time of CT, refer to the radiology report. See A&P below for TPA Decision and door to needle time if and when applicable.    HPI: Lorene Haro is a 46 year old woman with hypertension, paroxysmal atrial fibrillation, on coumadin, COPD on home oxygen, KATHIA, presenting with acute onset weakness and paresthesias.  She reports that around noon today, she developed RLQ abdominal pain, and this was followed by generalized weakness and numbness in her lower extremities.  Symptoms persisted so she called EMS and was brought to [facility] as possible stroke.  On arrival, NIHSS 4 as noted below- with generalized weakness, and bilateral LE paresthesias.  She endorses no headache.  Head CT revealing no hemorrhage, no large territory stroke, no mass.  CT angiogram of head and neck with no large vessel occlusion or flow limiting stenoses.  CT perfusion without focal perfusion defect.  On ROS she reports compliance with medications which includes coumadin for paroxysmal atrial fibrillation.  INR was 2.5 on 9/3/2021.  No bowel or urinary incontinence.  She is endorsing a cough and some epistaxis over the past few days.    Review of systems: In addition to what is detailed in the HPI above, all other systems reviewed and are negative.    Past Medical History:    has a past medical history of Asthma, Bipolar 2 disorder ([facility]), Chickenpox, Chronic obstructive pulmonary disease (HCC), Hypertension, On home oxygen therapy, Other psychological stress, Schizophrenic disorder ([facility]), and Yeast infection of the skin (4/1/2021). She also has no past medical history of CAD (coronary artery disease), Cancer (HCC), Congestive heart  failure (HCC), Diabetes, Liver disease, Renal disorder, Seizure disorder (HCC), or Stroke (HCC).    FHx:  family history includes Cancer in her sister; No Known Problems in her mother.    SHx:   reports that she quit smoking about 3 weeks ago. Her smoking use included cigarettes. She has a 20.00 pack-year smoking history. She has never used smokeless tobacco. She reports that she does not drink alcohol and does not use drugs.    Allergies:  Allergies   Allergen Reactions   • Amoxicillin Rash and Itching     Tolerates cephalosporins, pt reports that she gets a rash all over her body and gets itchy   • Penicillin G Rash and Itching     pt reports that she gets a rash all over her body and gets itchy       Medications:  No current facility-administered medications for this encounter.    Current Outpatient Medications:   •  traZODone (DESYREL) 50 MG Tab, Take  mg by mouth every day., Disp: , Rfl:   •  warfarin (COUMADIN) 6 MG Tab, Take one-half tablet (3mg) on Mondays, and 1 tablet (6mg) all other days or as directed by the [facility] (Patient taking differently: Take 6 mg by mouth every day. or as directed by the [facility]), Disp: 90 Tablet, Rfl: 1  •  furosemide (LASIX) 20 MG Tab, Take 20 mg by mouth 2 times a day., Disp: , Rfl:   •  diclofenac sodium 3 % Gel, Apply 1 g topically 3 times a day as needed., Disp: 100 g, Rfl: 0  •  Home Care Oxygen, Inhale 2 L/min continuous. Oxygen is at 2L/NC continuously Oxygen dose range: 2 L/min Respiratory route via: Nasal Cannula  Oxygen supplier:[employer]  , Disp: , Rfl:   •  fluticasone (FLONASE) 50 MCG/ACT nasal spray, Administer 1-2 Sprays into affected nostril(S) 2 times a day., Disp: , Rfl:   •  famotidine (PEPCID) 40 MG Tab, Take 40 mg by mouth every day. Indications: Heartburn, Disp: , Rfl:   •  nitroglycerin (NITROSTAT) 0.4 MG SL Tab, Place 1 tablet under the tongue as needed for Chest Pain., Disp: 25 tablet, Rfl: 0  •  albuterol 108 (90  Base) MCG/ACT Aero Soln inhalation aerosol, Inhale 2 Puffs every 6 hours as needed for Shortness of Breath., Disp: 8.5 g, Rfl: 0  •  acetaminophen (TYLENOL) 500 MG Tab, Take 500 mg by mouth every 6 hours as needed (pain)., Disp: , Rfl:   •  potassium chloride SA (KDUR) 20 MEQ Tab CR, Take 1 tablet by mouth every day., Disp: 30 tablet, Rfl: 3  •  montelukast (SINGULAIR) 10 MG Tab, Take 1 Tab by mouth every bedtime., Disp: 30 Tab, Rfl: 11  •  atorvastatin (LIPITOR) 20 MG Tab, Take 1 Tab by mouth every day., Disp: 30 Tab, Rfl: 1  •  tiotropium (SPIRIVA HANDIHALER) 18 MCG Cap, Inhale 1 Cap by mouth every day., Disp: 30 Cap, Rfl: 3  •  aripiprazole (ABILIFY) 30 MG tablet, Take 30 mg by mouth every morning., Disp: , Rfl:     Physical Examination:    There were no vitals filed for this visit.      General: Patient is awake and in no acute distress  Eye: Examination of optic disks not indicated at this time given acuity of consult  Neck: There is normal range of motion  CV: Regular rate   Extremities:  Edematous lower extremities, venous stasis    NEUROLOGICAL EXAM:     Mental status: Awake, oriented  Speech and language: Speech is clear and fluent. The patient is able to name and repeat, and follow commands  Cranial nerve exam: Visual fields are full. There is no nystagmus. Extraocular muscles are intact. Face is symmetric. Sensation in the face is intact to light touch. .  Motor exam: LUE/LLE and RUE with drift, but not to bed- appears effort dependent.  RLE is sustained antigravity  Sensory exam:  Reacts to tactile in all 4 distal extremities, there is no neglect to double stim.  Coordination: No ataxia on bilateral finger-to-nose testing.  Gait: Deferred due to patient preference.    NIHSS: National Institutes of Health Stroke Scale    [0] 1a:Level of Consciousness    0-alert 1-drowsy   2-stupor   3-coma  [0] 1b:LOC Questions                  0-both  1-one      2-neither  [0] 1c:LOC Commands                   0-both   1-one      2-neither  [0] 2: Best Gaze                     0-nl    1-partial  2-forced  [0] 3: Visual Fields                   0-nl    1-partial  2-complete 3-bilat  [0] 4: Facial Paresis                0-nl    1-minor    2-partial  3-full  MOTOR                       0-nl  [1] 5: Right Arm           1-drift  [1] 6: Left Arm             2-some effort vs gravity  [0] 7: Right Leg           3-no effort vs gravity  [1] 8: Left Leg             4-no movement                             x-untestable  [0] 9: Limb Ataxia                    0-abs   1-1_limb   2-2+_limbs       x-untestable  [1] 10:Sensory                        0-nl    1-partial  2-dense  [0] 11:Best Language/Aphasia         0-nl    1-mild/mod 2-severe   3-mute  [0] 12:Dysarthria                     0-nl    1-mild/mod 2-severe       x-untestable  [0] 13:Neglect/Inattention            0-none  1-partial  2-complete  [4] TOTAL    Baseline Modified Rima Scale (MRS): 3 = Moderate disability; requires some help, but able to walk without assistance    Objective Data:    Labs:  Lab Results   Component Value Date/Time    PROTHROMBTM 26.1 (H) 08/22/2021 06:42 AM    INR 2.50 09/03/2021 10:32 AM      Lab Results   Component Value Date/Time    WBC 10.7 08/22/2021 06:42 AM    RBC 4.46 08/22/2021 06:42 AM    HEMOGLOBIN 12.3 08/22/2021 06:42 AM    HEMATOCRIT 40.3 08/22/2021 06:42 AM    MCV 90.4 08/22/2021 06:42 AM    MCH 27.6 08/22/2021 06:42 AM    MCHC 30.5 (L) 08/22/2021 06:42 AM    MPV 11.3 08/22/2021 06:42 AM    NEUTSPOLYS 74.90 (H) 08/22/2021 06:42 AM    LYMPHOCYTES 16.60 (L) 08/22/2021 06:42 AM    MONOCYTES 6.40 08/22/2021 06:42 AM    EOSINOPHILS 1.20 08/22/2021 06:42 AM    BASOPHILS 0.20 08/22/2021 06:42 AM    HYPOCHROMIA 1+ 07/18/2021 06:22 PM    ANISOCYTOSIS 1+ 08/10/2021 08:20 PM      Lab Results   Component Value Date/Time    SODIUM 141 08/22/2021 06:42 AM    POTASSIUM 3.8 08/22/2021 06:42 AM    CHLORIDE 102 08/22/2021 06:42 AM    CO2 28 08/22/2021 06:42 AM     GLUCOSE 117 (H) 08/22/2021 06:42 AM    BUN 7 (L) 08/22/2021 06:42 AM    CREATININE 0.82 08/22/2021 06:42 AM    CREATININE 0.9 09/11/2008 01:45 PM      Lab Results   Component Value Date/Time    CHOLSTRLTOT 198 05/10/2021 05:42 AM     (H) 05/10/2021 05:42 AM    HDL 48 05/10/2021 05:42 AM    TRIGLYCERIDE 126 05/10/2021 05:42 AM       Lab Results   Component Value Date/Time    ALKPHOSPHAT 128 (H) 08/22/2021 06:42 AM    ASTSGOT 11 (L) 08/22/2021 06:42 AM    ALTSGPT 14 08/22/2021 06:42 AM    TBILIRUBIN 0.5 08/22/2021 06:42 AM        Imaging/Testing:    I interpreted and/or reviewed the patient's neuroimaging    CT-CTA NECK WITH & W/O-POST PROCESSING   Final Result      CT angiogram of the neck within normal limits.      CT-CEREBRAL PERFUSION ANALYSIS   Final Result      1.  Cerebral blood flow less than 30% likely representing completed infarct = 0 mL.      2.  T Max more than 6 seconds likely representing combination of completed infarct and ischemia = 0 mL.      3.  Mismatched volume likely representing ischemic brain/penumbra = None      4.  Please note that the cerebral perfusion was performed on the limited brain tissue around the basal ganglia region. Infarct/ischemia outside the CT perfusion sections can be missed in this study.      CT-CTA HEAD WITH & W/O-POST PROCESS   Final Result      CT angiogram of the [place] within normal limits.      CT-HEAD W/O   Final Result         NO ACUTE ABNORMALITIES ARE NOTED ON CT SCAN OF THE HEAD.             Assessment and Plan:  Lorene Haro is a 46 year old woman with pAF, anticoagulated on coumadin, presenting with generalized weakness and bilateral lower extremity paresthesias.  Stroke protocol CT without evidence of hemorrhage or evolving stroke.  Clinical semiology and radiographic findings not suggestive of ischemic process, and thus she is not a candidate for any acute stroke interventions.  There is no clinical evidence of acute spinal cord  pathology either.  Would evaluate for toxic/metabolic/infectious derangements.  For primary stroke prevention standpoint- continue coumadin for INR goal 2-3.      Problem list:  1.  Generalized weakness, paresthesias  2.  Paroxysmal atrial fibrillation, anticoagulated on coumadin  3.  COPD    Recommendations:   - do not recommend additional neurodiagnostics or interventions at this time   - continue coumadin for goal INR 2-3 for primary stroke prevention   - please reconsult Neurology with any additional questions or concerns    Pola Rapp MD  Neurohospitalist, [facility]

## 2021-09-06 NOTE — ED NOTES
Patient discharged, lines removed, vitals stable. Patient is waiting for a taxi in the lobby. All questions answered.

## 2021-09-06 NOTE — ED NOTES
Received report from JC Barnes. Patient resting in bed, NAD. Reports a decrease in her previous symptoms, but is still having some tinging.     Patient off the floor to imaging.

## 2021-09-06 NOTE — ED TRIAGE NOTES
Lorene Haro  Chief Complaint   Patient presents with   • Possible Stroke     onset around noon, with left side weakness and (B)LE numbness/ tingling     Pt biba from home with above complaint. VSS, no acute distress. FSBS 104.    NIH 4 per ERP.    Pt to CT then RD 1, report to JC Tracy

## 2021-09-07 RX ORDER — POTASSIUM CHLORIDE 20 MEQ/1
20 TABLET, EXTENDED RELEASE ORAL DAILY
Qty: 30 TABLET | Refills: 3 | Status: ON HOLD | OUTPATIENT
Start: 2021-09-07 | End: 2021-10-13

## 2021-09-09 ENCOUNTER — ANTICOAGULATION MONITORING (OUTPATIENT)
Dept: VASCULAR LAB | Facility: MEDICAL CENTER | Age: 46
End: 2021-09-09

## 2021-09-09 ENCOUNTER — HOME CARE VISIT (OUTPATIENT)
Dept: HOME HEALTH SERVICES | Facility: HOME HEALTHCARE | Age: 46
End: 2021-09-09
Payer: MEDICAID

## 2021-09-09 VITALS
HEART RATE: 74 BPM | RESPIRATION RATE: 20 BRPM | OXYGEN SATURATION: 95 % | DIASTOLIC BLOOD PRESSURE: 62 MMHG | TEMPERATURE: 97.5 F | SYSTOLIC BLOOD PRESSURE: 94 MMHG

## 2021-09-09 DIAGNOSIS — I48.0 PAROXYSMAL A-FIB (HCC): ICD-10-CM

## 2021-09-09 LAB
INR PPP: 2.8 (ref 2–3.5)
INR PPP: 2.8 - CRITICAL LOW: < 0.8, CRITICAL HIGH: > 6.5 (ref 2–3.5)

## 2021-09-09 PROCEDURE — G0299 HHS/HOSPICE OF RN EA 15 MIN: HCPCS

## 2021-09-09 ASSESSMENT — ENCOUNTER SYMPTOMS
PERSON REPORTING PAIN: PATIENT
MUSCLE WEAKNESS: 1
LOWEST PAIN SEVERITY IN PAST 24 HOURS: 0/10
HIGHEST PAIN SEVERITY IN PAST 24 HOURS: 0/10
PAIN SEVERITY GOAL: 0/10
POOR JUDGMENT: 1
SHORTNESS OF BREATH: T
VOMITING: DENIES
SUBJECTIVE PAIN PROGRESSION: RAPIDLY IMPROVING
NAUSEA: DENIES
PAIN: PT CLAIMS NO PAIN TODAY.
DENIES PAIN: 1

## 2021-09-09 ASSESSMENT — ACTIVITIES OF DAILY LIVING (ADL)
CURRENT_FUNCTION: STAND BY ASSIST
AMBULATION ASSISTANCE: STAND BY ASSIST

## 2021-09-15 ENCOUNTER — HOME CARE VISIT (OUTPATIENT)
Dept: HOME HEALTH SERVICES | Facility: HOME HEALTHCARE | Age: 46
End: 2021-09-15
Payer: MEDICAID

## 2021-09-15 ENCOUNTER — APPOINTMENT (OUTPATIENT)
Dept: RADIOLOGY | Facility: MEDICAL CENTER | Age: 46
End: 2021-09-15
Attending: EMERGENCY MEDICINE
Payer: MEDICAID

## 2021-09-15 ENCOUNTER — HOSPITAL ENCOUNTER (EMERGENCY)
Facility: MEDICAL CENTER | Age: 46
End: 2021-09-15
Attending: EMERGENCY MEDICINE
Payer: MEDICAID

## 2021-09-15 VITALS
OXYGEN SATURATION: 97 % | RESPIRATION RATE: 17 BRPM | HEART RATE: 73 BPM | BODY MASS INDEX: 53.37 KG/M2 | DIASTOLIC BLOOD PRESSURE: 59 MMHG | SYSTOLIC BLOOD PRESSURE: 115 MMHG | WEIGHT: 290 LBS | TEMPERATURE: 98.4 F | HEIGHT: 62 IN

## 2021-09-15 VITALS
OXYGEN SATURATION: 96 % | DIASTOLIC BLOOD PRESSURE: 60 MMHG | HEART RATE: 16 BPM | TEMPERATURE: 98.4 F | SYSTOLIC BLOOD PRESSURE: 106 MMHG | RESPIRATION RATE: 16 BRPM

## 2021-09-15 DIAGNOSIS — R07.9 CHEST PAIN, UNSPECIFIED TYPE: ICD-10-CM

## 2021-09-15 DIAGNOSIS — Z79.01 CHRONIC ANTICOAGULATION: Primary | ICD-10-CM

## 2021-09-15 LAB
ALBUMIN SERPL BCP-MCNC: 3.9 G/DL (ref 3.2–4.9)
ALBUMIN/GLOB SERPL: 1.2 G/DL
ALP SERPL-CCNC: 119 U/L (ref 30–99)
ALT SERPL-CCNC: 6 U/L (ref 2–50)
ANION GAP SERPL CALC-SCNC: 9 MMOL/L (ref 7–16)
AST SERPL-CCNC: 9 U/L (ref 12–45)
BASOPHILS # BLD AUTO: 0.2 % (ref 0–1.8)
BASOPHILS # BLD: 0.02 K/UL (ref 0–0.12)
BILIRUB SERPL-MCNC: 0.7 MG/DL (ref 0.1–1.5)
BUN SERPL-MCNC: 12 MG/DL (ref 8–22)
CALCIUM SERPL-MCNC: 9.4 MG/DL (ref 8.5–10.5)
CHLORIDE SERPL-SCNC: 102 MMOL/L (ref 96–112)
CO2 SERPL-SCNC: 31 MMOL/L (ref 20–33)
CREAT SERPL-MCNC: 0.9 MG/DL (ref 0.5–1.4)
EKG IMPRESSION: NORMAL
EOSINOPHIL # BLD AUTO: 0.11 K/UL (ref 0–0.51)
EOSINOPHIL NFR BLD: 1.1 % (ref 0–6.9)
ERYTHROCYTE [DISTWIDTH] IN BLOOD BY AUTOMATED COUNT: 52.6 FL (ref 35.9–50)
GLOBULIN SER CALC-MCNC: 3.2 G/DL (ref 1.9–3.5)
GLUCOSE SERPL-MCNC: 89 MG/DL (ref 65–99)
HCT VFR BLD AUTO: 39.4 % (ref 37–47)
HGB BLD-MCNC: 11.9 G/DL (ref 12–16)
IMM GRANULOCYTES # BLD AUTO: 0.05 K/UL (ref 0–0.11)
IMM GRANULOCYTES NFR BLD AUTO: 0.5 % (ref 0–0.9)
INR PPP: 2.08 (ref 0.87–1.13)
INR PPP: 2.5 (ref 2–3.5)
INR PPP: 2.5 - CRITICAL LOW: < 0.8, CRITICAL HIGH: > 6.5 (ref 2–3.5)
LIPASE SERPL-CCNC: 21 U/L (ref 11–82)
LYMPHOCYTES # BLD AUTO: 1.73 K/UL (ref 1–4.8)
LYMPHOCYTES NFR BLD: 17.2 % (ref 22–41)
MCH RBC QN AUTO: 27.1 PG (ref 27–33)
MCHC RBC AUTO-ENTMCNC: 30.2 G/DL (ref 33.6–35)
MCV RBC AUTO: 89.7 FL (ref 81.4–97.8)
MONOCYTES # BLD AUTO: 0.74 K/UL (ref 0–0.85)
MONOCYTES NFR BLD AUTO: 7.4 % (ref 0–13.4)
NEUTROPHILS # BLD AUTO: 7.39 K/UL (ref 2–7.15)
NEUTROPHILS NFR BLD: 73.6 % (ref 44–72)
NRBC # BLD AUTO: 0 K/UL
NRBC BLD-RTO: 0 /100 WBC
PLATELET # BLD AUTO: 268 K/UL (ref 164–446)
PMV BLD AUTO: 11.3 FL (ref 9–12.9)
POTASSIUM SERPL-SCNC: 3.7 MMOL/L (ref 3.6–5.5)
PROT SERPL-MCNC: 7.1 G/DL (ref 6–8.2)
PROTHROMBIN TIME: 22.8 SEC (ref 12–14.6)
RBC # BLD AUTO: 4.39 M/UL (ref 4.2–5.4)
SODIUM SERPL-SCNC: 142 MMOL/L (ref 135–145)
TROPONIN T SERPL-MCNC: 24 NG/L (ref 6–19)
TROPONIN T SERPL-MCNC: 25 NG/L (ref 6–19)
WBC # BLD AUTO: 10 K/UL (ref 4.8–10.8)

## 2021-09-15 PROCEDURE — A9270 NON-COVERED ITEM OR SERVICE: HCPCS | Performed by: EMERGENCY MEDICINE

## 2021-09-15 PROCEDURE — 99285 EMERGENCY DEPT VISIT HI MDM: CPT

## 2021-09-15 PROCEDURE — 83690 ASSAY OF LIPASE: CPT

## 2021-09-15 PROCEDURE — 71045 X-RAY EXAM CHEST 1 VIEW: CPT

## 2021-09-15 PROCEDURE — 700105 HCHG RX REV CODE 258: Performed by: EMERGENCY MEDICINE

## 2021-09-15 PROCEDURE — 80053 COMPREHEN METABOLIC PANEL: CPT

## 2021-09-15 PROCEDURE — 700102 HCHG RX REV CODE 250 W/ 637 OVERRIDE(OP): Performed by: EMERGENCY MEDICINE

## 2021-09-15 PROCEDURE — 85025 COMPLETE CBC W/AUTO DIFF WBC: CPT

## 2021-09-15 PROCEDURE — 85610 PROTHROMBIN TIME: CPT

## 2021-09-15 PROCEDURE — G0493 RN CARE EA 15 MIN HH/HOSPICE: HCPCS

## 2021-09-15 PROCEDURE — 93005 ELECTROCARDIOGRAM TRACING: CPT | Performed by: EMERGENCY MEDICINE

## 2021-09-15 PROCEDURE — 84484 ASSAY OF TROPONIN QUANT: CPT | Mod: 91

## 2021-09-15 RX ORDER — ASPIRIN 325 MG
325 TABLET ORAL ONCE
Status: COMPLETED | OUTPATIENT
Start: 2021-09-15 | End: 2021-09-15

## 2021-09-15 RX ORDER — SODIUM CHLORIDE 9 MG/ML
500 INJECTION, SOLUTION INTRAVENOUS ONCE
Status: COMPLETED | OUTPATIENT
Start: 2021-09-15 | End: 2021-09-15

## 2021-09-15 RX ADMIN — SODIUM CHLORIDE 500 ML: 9 INJECTION, SOLUTION INTRAVENOUS at 18:48

## 2021-09-15 RX ADMIN — ASPIRIN 325 MG ORAL TABLET 325 MG: 325 PILL ORAL at 17:57

## 2021-09-15 SDOH — ECONOMIC STABILITY: HOUSING INSECURITY
HOME SAFETY: SESSMENT PERFORMED. PATIENT PT WAS GIVEN A NO SMOKING SIGN AND PROVIDED EDUCATION ABOUT WHY IT IS IMPORTANT TO PLACE ONE. PATIENT DOES HAVE A WORKING FIRE EXTINGUISHER PRESENT IN THE HOME. SMOKE ALARMS ARE PRESENT AND FUNCTIONAL ON EACH LEVEL OF THE

## 2021-09-15 SDOH — ECONOMIC STABILITY: HOUSING INSECURITY
HOME SAFETY: PT HAS NO BATHROOM GRAB BARS (PT CLAIMS APARTMENT MANAGER IS NOT WILLING TO INSTALL AT THIS TIME) CLUTTERED LIVING AREA (PT IS ATTEMPTING TO CLEAN), EXTENDED OXYGEN TUBING THAT CREATES A POTENTIAL RISK AS A TRIP/SLIP HAZARD.     OXYGEN SAFETY RISK AS

## 2021-09-15 SDOH — ECONOMIC STABILITY: HOUSING INSECURITY: EVIDENCE OF SMOKING MATERIAL: 1

## 2021-09-15 SDOH — ECONOMIC STABILITY: HOUSING INSECURITY
HOME SAFETY: HOME. PATIENT DOES HAVE A FIRE ESCAPE PLAN DEVELOPED. PATIENT DOES NOT HAVE FLAMMABLE MATERIALS PRESENT IN THE HOME PRESENTING A FIRE HAZARD. EVIDENCE FOUND OF SMOKING MATERIALS PRESENT IN THE HOME.

## 2021-09-15 ASSESSMENT — ENCOUNTER SYMPTOMS
SUBJECTIVE PAIN PROGRESSION: RAPIDLY WORSENING
LOWEST PAIN SEVERITY IN PAST 24 HOURS: 0/10
HIGHEST PAIN SEVERITY IN PAST 24 HOURS: 5/10
SHORTNESS OF BREATH: T
VOMITING: DENIES
NAUSEA: DENIES
PAIN SEVERITY GOAL: 0/10
POOR JUDGMENT: 1

## 2021-09-15 ASSESSMENT — FIBROSIS 4 INDEX: FIB4 SCORE: 0.53

## 2021-09-15 ASSESSMENT — PATIENT HEALTH QUESTIONNAIRE - PHQ9: CLINICAL INTERPRETATION OF PHQ2 SCORE: 0

## 2021-09-15 ASSESSMENT — LIFESTYLE VARIABLES: DO YOU DRINK ALCOHOL: NO

## 2021-09-15 ASSESSMENT — ACTIVITIES OF DAILY LIVING (ADL): OASIS_M1830: 03

## 2021-09-15 NOTE — ED TRIAGE NOTES
Lorene Haro  46 y.o.  Chief Complaint   Patient presents with   • Chest Pain     L-sided radiating across chest, described as 5/10 pressure, unrelieved with Nitro SL taken at home   • Hypotension     BP 80/40 for EMS     BIB EMS from home for above. A & O X 4, GCS 15. Mask in place.    States that she was just sitting relaxing in the lobby of her apartment when the pain came on all of a sudden. Pain is normally relieved with home Nitro but was NOT today, so she called 911 to present to the ED.    Hx. HTN, CHF, angina, COPD on continuous home O2 2L NC    Patient is FULLY vaccinated for COVID.    PTA received  mL for EMS.

## 2021-09-15 NOTE — PROGRESS NOTES
Received call from JC Cardoza from [facility].   Pt will be seen by him today instead of tomorrow.   Sent orders for [facility] to check INR today.  Pt will also be d/c'd from [facility] today    Kinjal Gant, Junior

## 2021-09-16 ENCOUNTER — ANTICOAGULATION MONITORING (OUTPATIENT)
Dept: VASCULAR LAB | Facility: MEDICAL CENTER | Age: 46
End: 2021-09-16

## 2021-09-16 ENCOUNTER — DOCUMENTATION (OUTPATIENT)
Dept: MEDICAL GROUP | Facility: PHYSICIAN GROUP | Age: 46
End: 2021-09-16

## 2021-09-16 DIAGNOSIS — I48.0 PAROXYSMAL A-FIB (HCC): ICD-10-CM

## 2021-09-16 LAB — BLOOD CULTURE HOLD CXBCH: NORMAL

## 2021-09-16 NOTE — ED NOTES
Patient discharged in stable condition per orders. IV access removed - bandage applied. Wristband removed per protocol. Patient verbalized understanding of all discharge instructions. All belongings accounted for. Discharged home via [facility] with oxygen 2L NC continuous in place.

## 2021-09-16 NOTE — ED NOTES
Patient ambulatory in hallway x 100 feet with front-wheeled walker with oxygen 2L NC in place with SBA by ED Tech. Tolerated well with no complaints of dizziness/lightheadedness.

## 2021-09-16 NOTE — DISCHARGE INSTRUCTIONS
Follow-up with your cardiologist and primary care provider    Return to the ER for recurrent chest pain, difficulty breathing, or other concerns.    Continue with your medications as prescribed.

## 2021-09-16 NOTE — PROGRESS NOTES
Medication chart review for [facility] services    PCP:  SHANKAR Gleason  [address] [address]  Fax: [phone]    Current medication list     Current Outpatient Medications:   •  potassium chloride SA, 20 mEq, Oral, DAILY  •  ondansetron, 4 mg, Oral, Q6HRS PRN  •  traZODone,  mg, Oral, DAILY  •  warfarin, Take one-half tablet (3mg) on Mondays, and 1 tablet (6mg) all other days or as directed by the [facility] (Patient taking differently: 6 mg, Oral, DAILY, or as directed by the [facility])  •  furosemide, 20 mg, Oral, BID  •  diclofenac sodium, 1 g, Apply externally, TID PRN  •  Home Care Oxygen, 2 L/min, Inhalation, Continuous  •  fluticasone, 1-2 Spray, Nasal, BID  •  famotidine, 40 mg, Oral, DAILY  •  nitroglycerin, 0.4 mg, Sublingual, PRN  •  albuterol, 2 Puff, Inhalation, Q6HRS PRN  •  acetaminophen, 500 mg, Oral, Q6HRS PRN  •  montelukast, 10 mg, Oral, QHS  •  atorvastatin, 20 mg, Oral, DAILY  •  Spiriva HandiHaler, 18 mcg, Inhalation, DAILY  •  aripiprazole, 30 mg, Oral, QAM    Allergies   Allergen Reactions   • Amoxicillin Rash and Itching     Tolerates cephalosporins, pt reports that she gets a rash all over her body and gets itchy   • Penicillin G Rash and Itching     pt reports that she gets a rash all over her body and gets itchy       Labs     Lab Results   Component Value Date/Time    HBA1C 6.1 (H) 06/17/2021 10:37 AM          Lab Results   Component Value Date/Time    CHOLSTRLTOT 198 05/10/2021 05:42 AM     (H) 05/10/2021 05:42 AM    HDL 48 05/10/2021 05:42 AM    TRIGLYCERIDE 126 05/10/2021 05:42 AM       Lab Results   Component Value Date/Time    SODIUM 142 09/15/2021 05:00 PM    POTASSIUM 3.7 09/15/2021 05:00 PM    CHLORIDE 102 09/15/2021 05:00 PM    CO2 31 09/15/2021 05:00 PM    GLUCOSE 89 09/15/2021 05:00 PM    BUN 12 09/15/2021 05:00 PM    CREATININE 0.90 09/15/2021 05:00 PM    CREATININE 0.9 09/11/2008 01:45 PM     Lab Results    Component Value Date/Time    ALKPHOSPHAT 119 (H) 09/15/2021 05:00 PM    ASTSGOT 9 (L) 09/15/2021 05:00 PM    ALTSGPT 6 09/15/2021 05:00 PM    TBILIRUBIN 0.7 09/15/2021 05:00 PM    INR 2.50 09/15/2021 09:39 PM    ALBUMIN 3.9 09/15/2021 05:00 PM      Lab Results   Component Value Date/Time    RBC 4.39 09/15/2021 05:00 PM    HEMOGLOBIN 11.9 (L) 09/15/2021 05:00 PM    HEMATOCRIT 39.4 09/15/2021 05:00 PM    PLATELETCT 268 09/15/2021 05:00 PM      No results found for: MALBCRT, MICROALBUR      Assessment and Plan:   • Received referral from [facility]. Medications reviewed.         Shaw Read, PharmD, MS, BCACP, LCC  [facility]  Phone [phone] fax [phone]    This note was created using voice recognition software (Dragon). The accuracy of the dictation is limited by the abilities of the software. I have reviewed the note prior to signing, however some errors in grammar and context are still possible. If you have any questions related to this note please do not hesitate to contact our office.

## 2021-09-16 NOTE — DISCHARGE PLANNING
RN has notified SW that Pt is medically cleared and needs transportation to home.   Pt uses walker and electric scooter both of which were left at home.   Pt uses 2L oxygen.     KETTY contacted [facility] and spoke to [facility].  PCS completed and faxed to [facility]  Transportation time arranged for 2200    RN updated on 2200 [facility] transportation time

## 2021-09-16 NOTE — ED PROVIDER NOTES
ED Provider Note  CHIEF COMPLAINT  Chief Complaint   Patient presents with   • Chest Pain     L-sided radiating across chest, described as 5/10 pressure, unrelieved with Nitro SL taken at home   • Hypotension     BP 80/40 for EMS       HPI  Lorene Haro is a 46 y.o. female former smoker with a history of angina, CHF, dyslipidemia, asthma, GERD, A. fib on Coumadin, who presents complaining of chest pain.    Patient states her left-sided chest pain began at 9 AM.  This occurred at rest.  It is similar to the pain that she gets every few months in character and quality except it was not relieved with her usual nitroglycerin which she took at 2 PM and 2:45 PM.  When EMS arrived her pressure was low and she had normal saline at 200 mL bolus with resolution.  Patient reports associated nausea and left facial numbness with her chest pain that radiated from the left side of her chest across to the right.    Patient denies vomiting, diaphoresis, worsening shortness of breath (she states she should quit smoking to help this), hemoptysis, fever, chills, abdominal pain, syncope.    Patient is on 2 L of home O2 around-the-clock.      ALLERGIES  Allergies   Allergen Reactions   • Amoxicillin Rash and Itching     Tolerates cephalosporins, pt reports that she gets a rash all over her body and gets itchy   • Penicillin G Rash and Itching     pt reports that she gets a rash all over her body and gets itchy       CURRENT MEDICATIONS  Home Medications     Reviewed by Nohemy Tyler R.N. (Registered Nurse) on 09/15/21 at 1646  Med List Status: Partial   Medication Last Dose Status   acetaminophen (TYLENOL) 500 MG Tab  Active   albuterol 108 (90 Base) MCG/ACT Aero Soln inhalation aerosol  Active   aripiprazole (ABILIFY) 30 MG tablet  Active   atorvastatin (LIPITOR) 20 MG Tab  Active   clindamycin (CLEOCIN) 300 MG Cap  Active   diclofenac sodium 3 % Gel  Active   famotidine (PEPCID) 40 MG Tab  Active   fluticasone (FLONASE) 50  "MCG/ACT nasal spray  Active   furosemide (LASIX) 20 MG Tab  Active   Home Care Oxygen  Active   montelukast (SINGULAIR) 10 MG Tab  Active   nitroglycerin (NITROSTAT) 0.4 MG SL Tab  Active   ondansetron (ZOFRAN ODT) 4 MG TABLET DISPERSIBLE  Active   potassium chloride SA (KDUR) 20 MEQ Tab CR  Active   tiotropium (SPIRIVA HANDIHALER) 18 MCG Cap  Active   traZODone (DESYREL) 50 MG Tab  Active   warfarin (COUMADIN) 6 MG Tab  Active                PAST MEDICAL HISTORY   has a past medical history of Asthma, Bipolar 2 disorder (HCC), Chickenpox, Chronic obstructive pulmonary disease (HCC), Hypertension, On home oxygen therapy, Other psychological stress, Schizophrenic disorder (HCC), and Yeast infection of the skin (2021).    SURGICAL HISTORY   has a past surgical history that includes hysterectomy laparoscopy; colectomy; cholecystectomy; and knee arthroscopy (Left).    SOCIAL HISTORY  Social History     Tobacco Use   • Smoking status: Current Every Day Smoker     Packs/day: 0.50     Years: 20.00     Pack years: 10.00     Types: Cigarettes     Last attempt to quit: 2021     Years since quittin.1   • Smokeless tobacco: Never Used   Vaping Use   • Vaping Use: Never used   Substance and Sexual Activity   • Alcohol use: No   • Drug use: No   • Sexual activity: Not Currently       Family Hx:  Hypertension      REVIEW OF SYSTEMS  See HPI for further details.  All other systems are negative except as above in HPI.    PHYSICAL EXAM  VITAL SIGNS: /50   Pulse 71   Temp 36.6 °C (97.8 °F) (Temporal)   Resp 15   Ht 1.575 m (5' 2\")   Wt (!) 132 kg (290 lb)   LMP  (LMP Unknown)   SpO2 97%   BMI 53.04 kg/m²     General:  WD female with elevated BMI, nontoxic appearing in NAD; A+Ox3; V/S as above; afebrile, not tachycardic or hypoxic  Skin: warm and dry; good color; no rash  HEENT: NCAT; EOMs intact; PERRL; no scleral icterus   Neck: FROM; no JVD or stridor  Cardiovascular: Regular heart rate and rhythm.  No " murmurs, rubs, or gallops; pulses 2+ bilaterally radially and DP areas  Lungs: Faint expiratory wheeze with decreased air movement bilaterally.  No rhonchi, or rales.   Abdomen: BS present; soft; NTND; no rebound, guarding, or rigidity.  No organomegaly or pulsatile mass  Extremities: PERKINS x 4; no e/o trauma; 2+ bilateral leg edema; neg Aspen's  Neurologic: CNs III-XII grossly intact; speech clear; distal sensation intact; strength 5/5 UE/LEs  Psychiatric: flat affect, normal mood    LABS  Results for orders placed or performed during the hospital encounter of 09/15/21   CBC with Differential   Result Value Ref Range    WBC 10.0 4.8 - 10.8 K/uL    RBC 4.39 4.20 - 5.40 M/uL    Hemoglobin 11.9 (L) 12.0 - 16.0 g/dL    Hematocrit 39.4 37.0 - 47.0 %    MCV 89.7 81.4 - 97.8 fL    MCH 27.1 27.0 - 33.0 pg    MCHC 30.2 (L) 33.6 - 35.0 g/dL    RDW 52.6 (H) 35.9 - 50.0 fL    Platelet Count 268 164 - 446 K/uL    MPV 11.3 9.0 - 12.9 fL    Neutrophils-Polys 73.60 (H) 44.00 - 72.00 %    Lymphocytes 17.20 (L) 22.00 - 41.00 %    Monocytes 7.40 0.00 - 13.40 %    Eosinophils 1.10 0.00 - 6.90 %    Basophils 0.20 0.00 - 1.80 %    Immature Granulocytes 0.50 0.00 - 0.90 %    Nucleated RBC 0.00 /100 WBC    Neutrophils (Absolute) 7.39 (H) 2.00 - 7.15 K/uL    Lymphs (Absolute) 1.73 1.00 - 4.80 K/uL    Monos (Absolute) 0.74 0.00 - 0.85 K/uL    Eos (Absolute) 0.11 0.00 - 0.51 K/uL    Baso (Absolute) 0.02 0.00 - 0.12 K/uL    Immature Granulocytes (abs) 0.05 0.00 - 0.11 K/uL    NRBC (Absolute) 0.00 K/uL   Complete Metabolic Panel (CMP)   Result Value Ref Range    Sodium 142 135 - 145 mmol/L    Potassium 3.7 3.6 - 5.5 mmol/L    Chloride 102 96 - 112 mmol/L    Co2 31 20 - 33 mmol/L    Anion Gap 9.0 7.0 - 16.0    Glucose 89 65 - 99 mg/dL    Bun 12 8 - 22 mg/dL    Creatinine 0.90 0.50 - 1.40 mg/dL    Calcium 9.4 8.5 - 10.5 mg/dL    AST(SGOT) 9 (L) 12 - 45 U/L    ALT(SGPT) 6 2 - 50 U/L    Alkaline Phosphatase 119 (H) 30 - 99 U/L    Total Bilirubin 0.7  0.1 - 1.5 mg/dL    Albumin 3.9 3.2 - 4.9 g/dL    Total Protein 7.1 6.0 - 8.2 g/dL    Globulin 3.2 1.9 - 3.5 g/dL    A-G Ratio 1.2 g/dL   Troponin   Result Value Ref Range    Troponin T 25 (H) 6 - 19 ng/L   LIPASE   Result Value Ref Range    Lipase 21 11 - 82 U/L   ESTIMATED GFR   Result Value Ref Range    GFR If African American >60 >60 mL/min/1.73 m 2    GFR If Non African American >60 >60 mL/min/1.73 m 2   Prothrombin Time   Result Value Ref Range    PT 22.8 (H) 12.0 - 14.6 sec    INR 2.08 (H) 0.87 - 1.13   TROPONIN   Result Value Ref Range    Troponin T 24 (H) 6 - 19 ng/L   EKG   Result Value Ref Range    Report       [facility] Emergency Dept.    Test Date:  2021-09-15  Pt Name:    ADELINA MILLAN                Department: ER  MRN:        [id number]                      Room:       [facility]  Gender:     Female                       Technician: [id number]  :        1975                   Requested By:ER TRIAGE PROTOCOL  Order #:    [id number]                    Reading MD: NERY ENRIQUEZ MD    Measurements  Intervals                                Axis  Rate:       72                           P:          9  ND:         128                          QRS:        52  QRSD:       102                          T:          6  QT:         379  QTc:        415    Interpretive Statements  Sinus rhythm  Atrial premature complex  Probable left atrial enlargement  Low voltage, precordial leads  ST elev, probable normal early repol pattern  Compared to ECG 2021 19:50:35  Atrial premature complex(es) now present  Low QRS voltage now present  ST (T wave) deviation now present  isolated t wa ve inversion in III  no acut ST changes  Electronically Signed On 9- 17:11:49 PDT by NERY ENRIQUEZ MD     EKG   Result Value Ref Range    Report       [facility] Emergency Dept.    Test Date:  2021-09-15  Pt Name:    ADELINA MILLAN                Department: ER  MRN:        [id number]                       Room:        18  Gender:     Female                       Technician: [id number]  :        1975                   Requested By:NERY ENRIQUEZ  Order #:    [id number]                    Reading MD: NERY ENRIQUEZ MD    Measurements  Intervals                                Axis  Rate:       74                           P:          18  NM:         131                          QRS:        57  QRSD:       101                          T:          3  QT:         381  QTc:        423    Interpretive Statements  Sinus rhythm  Atrial premature complex  Probable left atrial enlargement  Low voltage, precordial leads  ST elev, probable normal early repol pattern  Compared to ECG 09/15/2021 17:03:02  No significant changes  Electronically Signed On 9- 19:04:03 PDT by NERY ENRIQUEZ MD         IMAGING  DX-CHEST-PORTABLE (1 VIEW)   Final Result      No acute cardiac or pulmonary abnormalities are identified.            MEDICAL RECORD  I have reviewed patient's medical record and pertinent results are listed below.      COURSE & MEDICAL DECISION MAKING  I have reviewed any medical record information, laboratory studies and radiographic results as noted.    Lorene Haro is a 46 y.o. female smoker with history of angina, CHF who presents complaining of chest pain.      Appropriate PPE was worn at all times while interacting with the patient.    Troponin #1 is 25.  Upon review of the patient's prior troponin levels there is a range from 22-30.  We will repeat/obtain a delta.     HEART  HEART Score: 4  4-6 12-17% risk    Repeat EKG demonstrates no acute ST changes.  Delta troponin and INR were ordered.    Chest x-ray is negative for acute pathology.    8:30 PM  Second troponin is unchanged/no delta.  INR is therapeutic at 2.08.  Blood pressure responded to fluids.  Patient was reevaluated.  Her blood pressure is currently 105 systolic.  There is some dark urine in the room.  She states she has  felt nauseous intermittently throughout the week and that she has not been drinking enough water.  Patient uses a walker at home.  She was advised of her results and the plan to discharge.  She feels comfortable with this plan.  Patient was ambulated with a walker here in the department and was steady.    FINAL IMPRESSION  1. Chest pain, unspecified type       Electronically signed by: India Keenan M.D., 9/15/2021 5:00 PM

## 2021-09-17 PROCEDURE — 665003 FOLLOW UP-HOME HEALTH

## 2021-09-17 NOTE — PROGRESS NOTES
Anticoagulation Summary  As of 2021    INR goal:  2.0-3.0   TTR:  30.7 % (4.9 mo)   INR used for dosin.50 (9/15/2021)   Warfarin maintenance plan:  6 mg (6 mg x 1) every day   Weekly warfarin total:  42 mg   Plan last modified:  Shaw Read, PharmD (9/3/2021)   Next INR check:     Target end date:  Indefinite    Indications    Paroxysmal A-fib (HCC) [I48.0]             Anticoagulation Episode Summary     INR check location:      Preferred lab:      Send INR reminders to:      Comments:  [facility]         Anticoagulation Patient Findings          Left voicemail message to report a therapeutic INR of 2.5.  Pt to continue with current warfarin dosing regimen. Requested pt contact the clinic for any s/s of unusual bleeding, bruising, clotting or any changes to diet or medication. FU 1 weeks.    Ciro Mohamud, PharmD

## 2021-09-20 ENCOUNTER — APPOINTMENT (OUTPATIENT)
Dept: SLEEP MEDICINE | Facility: MEDICAL CENTER | Age: 46
End: 2021-09-20
Attending: INTERNAL MEDICINE
Payer: MEDICAID

## 2021-09-21 ENCOUNTER — HOME CARE VISIT (OUTPATIENT)
Dept: HOME HEALTH SERVICES | Facility: HOME HEALTHCARE | Age: 46
End: 2021-09-21
Payer: MEDICAID

## 2021-09-21 ENCOUNTER — NON-PROVIDER VISIT (OUTPATIENT)
Dept: SLEEP MEDICINE | Facility: MEDICAL CENTER | Age: 46
End: 2021-09-21
Attending: INTERNAL MEDICINE
Payer: MEDICAID

## 2021-09-21 VITALS — HEIGHT: 61 IN | BODY MASS INDEX: 54.37 KG/M2 | WEIGHT: 288 LBS

## 2021-09-21 DIAGNOSIS — J96.12 CHRONIC RESPIRATORY FAILURE WITH HYPOXIA AND HYPERCAPNIA (HCC): ICD-10-CM

## 2021-09-21 DIAGNOSIS — G47.33 OSA AND COPD OVERLAP SYNDROME (HCC): ICD-10-CM

## 2021-09-21 DIAGNOSIS — J44.9 OSA AND COPD OVERLAP SYNDROME (HCC): ICD-10-CM

## 2021-09-21 DIAGNOSIS — J96.11 CHRONIC RESPIRATORY FAILURE WITH HYPOXIA AND HYPERCAPNIA (HCC): ICD-10-CM

## 2021-09-21 PROCEDURE — 94729 DIFFUSING CAPACITY: CPT | Performed by: INTERNAL MEDICINE

## 2021-09-21 PROCEDURE — 94060 EVALUATION OF WHEEZING: CPT | Performed by: INTERNAL MEDICINE

## 2021-09-21 PROCEDURE — 94726 PLETHYSMOGRAPHY LUNG VOLUMES: CPT | Performed by: INTERNAL MEDICINE

## 2021-09-21 ASSESSMENT — PULMONARY FUNCTION TESTS
FVC_PREDICTED: 3.14
FEV1_PERCENT_CHANGE: 14
FVC: 1.66
FEV1_PREDICTED: 2.54
FEV1/FVC_PERCENT_CHANGE: 136
FEV1/FVC: 89.16
FEV1_LLN: 2.12
FEV1/FVC_PERCENT_PREDICTED: 112
FEV1: 1.24
FEV1/FVC_PERCENT_CHANGE: 4
FEV1/FVC: 86
FEV1_PERCENT_CHANGE: 19
FVC_LLN: 2.62
FEV1/FVC_PERCENT_PREDICTED: 109
FEV1/FVC_PERCENT_PREDICTED: 81
FEV1/FVC_PERCENT_PREDICTED: 105
FEV1/FVC: 86
FVC_PERCENT_PREDICTED: 46
FVC_PERCENT_PREDICTED: 52
FEV1/FVC_PERCENT_PREDICTED: 104
FEV1_PERCENT_PREDICTED: 58
FEV1/FVC: 89
FEV1/FVC_PREDICTED: 81
FEV1_PERCENT_PREDICTED: 48
FVC: 1.45
FEV1: 1.48
FEV1/FVC_PERCENT_LLN: 68

## 2021-09-21 ASSESSMENT — FIBROSIS 4 INDEX: FIB4 SCORE: 0.66

## 2021-09-21 NOTE — CASE COMMUNICATION
Quality Review for 9/15/21 Recertification OASIS by MARCEL Wyatt RN on  September 21, 2021    Edits completed by MARCEL Wyatt RN:  1. Safety measures checked oxygen precautions

## 2021-09-21 NOTE — PROCEDURES
Technician: Johanna Anderson RRT, CPFT  Good patient effort & cooperation. BEST EFFORT REPORTED FOR PLETH, PATIENT HAD A HARD TIME PERFORMING THIS STEP TODAY. The results of this test meet the ATS/ERS standards for acceptability & reproducibility.  Test was performed on the [employer] Body Plethysmograph-Elite DX system.  Predicted equations for Spirometry are GLI-2012, ITS for lung volumes, and GLI-2017 for DLCO.  The DLCO was uncorrected for Hgb.  A bronchodilator of Ventolin HFA -2puffs via spacer administered.  DLCO performed during dilation period.     Interpretation;   Baseline spirometry shows significant reduction in FVC at 1.45 L or 46% predicted and FEV1 at 1.24 L or 48% predicted with FEV1/FVC ratio of 86 suggesting restriction.  There is significant bronchodilator response with postbronchodilator FEV1 of 1.48 L or 58% predicted.  Total lung capacity is just below lower limits of normal at 3.61 L or 78% predicted.  Diffusion capacity is within normal limits at 85% predicted.  Pulmonary function testing shows mixed restrictive obstructive defect with significant bronchodilator response.  This could be consistent with COPD in a patient with morbid obesity.  Correlate clinically and with imaging

## 2021-09-23 ENCOUNTER — HOME CARE VISIT (OUTPATIENT)
Dept: HOME HEALTH SERVICES | Facility: HOME HEALTHCARE | Age: 46
End: 2021-09-23
Payer: MEDICAID

## 2021-09-23 ENCOUNTER — ANTICOAGULATION MONITORING (OUTPATIENT)
Dept: MEDICAL GROUP | Facility: PHYSICIAN GROUP | Age: 46
End: 2021-09-23

## 2021-09-23 VITALS
OXYGEN SATURATION: 94 % | HEART RATE: 77 BPM | RESPIRATION RATE: 18 BRPM | SYSTOLIC BLOOD PRESSURE: 96 MMHG | TEMPERATURE: 97.9 F | DIASTOLIC BLOOD PRESSURE: 58 MMHG

## 2021-09-23 DIAGNOSIS — I48.0 PAROXYSMAL A-FIB (HCC): ICD-10-CM

## 2021-09-23 LAB
INR PPP: 1.9 (ref 2–3.5)
INR PPP: 1.9 - CRITICAL LOW: < 0.8, CRITICAL HIGH: > 6.5 (ref 2–3.5)

## 2021-09-23 PROCEDURE — 665003 FOLLOW UP-HOME HEALTH

## 2021-09-23 PROCEDURE — G0299 HHS/HOSPICE OF RN EA 15 MIN: HCPCS

## 2021-09-23 ASSESSMENT — ENCOUNTER SYMPTOMS
PAIN SEVERITY GOAL: 0/10
SHORTNESS OF BREATH: T
HIGHEST PAIN SEVERITY IN PAST 24 HOURS: 0/10
VOMITING: DENIES
POOR JUDGMENT: 1
PAIN: PT CLAIMS NO PAIN TODAY.
LOWEST PAIN SEVERITY IN PAST 24 HOURS: 0/10
PERSON REPORTING PAIN: PATIENT
MUSCLE WEAKNESS: 1
DENIES PAIN: 1

## 2021-09-23 ASSESSMENT — ACTIVITIES OF DAILY LIVING (ADL)
AMBULATION ASSISTANCE: STAND BY ASSIST
CURRENT_FUNCTION: STAND BY ASSIST

## 2021-09-24 NOTE — PROGRESS NOTES
I attempted to call this patient to report their INR. Unfortunately, I was not able to speak with them or leave a voice message; therefore, we will need to call back at a later time.      JOANN

## 2021-09-25 NOTE — CASE COMMUNICATION
I agree with these changes.    ----- Message -----  From: Desiree Wyatt R.N.  Sent: 9/21/2021   2:28 PM PDT  To: Sony Fitzpatrick R.N.      Quality Review for 9/15/21 Recertification OASIS by MARCEL Wyatt RN on  September 21, 2021    Edits completed by MARCEL Wyatt RN:  1. Safety measures checked oxygen precautions

## 2021-09-28 DIAGNOSIS — Z79.01 CHRONIC ANTICOAGULATION: ICD-10-CM

## 2021-09-30 ENCOUNTER — ANTICOAGULATION MONITORING (OUTPATIENT)
Dept: CARDIOLOGY | Facility: MEDICAL CENTER | Age: 46
End: 2021-09-30

## 2021-09-30 ENCOUNTER — HOME CARE VISIT (OUTPATIENT)
Dept: HOME HEALTH SERVICES | Facility: HOME HEALTHCARE | Age: 46
End: 2021-09-30
Payer: MEDICAID

## 2021-09-30 DIAGNOSIS — I48.0 PAROXYSMAL A-FIB (HCC): ICD-10-CM

## 2021-09-30 LAB
INR PPP: 1.5 (ref 2–3.5)
INR PPP: 1.5 - CRITICAL LOW: < 0.8, CRITICAL HIGH: > 6.5 (ref 2–3.5)

## 2021-09-30 PROCEDURE — G0299 HHS/HOSPICE OF RN EA 15 MIN: HCPCS

## 2021-09-30 ASSESSMENT — ENCOUNTER SYMPTOMS
PAIN LOCATION - PAIN FREQUENCY: INFREQUENT
PAIN LOCATION - EXACERBATING FACTORS: AMBULATION
VOMITING: DENIES
LOWEST PAIN SEVERITY IN PAST 24 HOURS: 5/10
NAUSEA: DENIES
SUBJECTIVE PAIN PROGRESSION: UNCHANGED
PAIN LOCATION - PAIN QUALITY: ACHY
PAIN LOCATION - RELIEVING FACTORS: MEDICATION, REST, REPOSITIONING
MUSCLE WEAKNESS: 1
HIGHEST PAIN SEVERITY IN PAST 24 HOURS: 5/10
PAIN: 1
PAIN LOCATION - PAIN SEVERITY: 5/10
PAIN LOCATION: LEFT LEG
PERSON REPORTING PAIN: PATIENT
PAIN LOCATION - PAIN DURATION: 1 DAY
SHORTNESS OF BREATH: T
PAIN SEVERITY GOAL: 0/10

## 2021-09-30 ASSESSMENT — ACTIVITIES OF DAILY LIVING (ADL)
CURRENT_FUNCTION: STAND BY ASSIST
AMBULATION ASSISTANCE: STAND BY ASSIST

## 2021-09-30 NOTE — PROGRESS NOTES
Anticoagulation Summary  As of 2021    INR goal:  2.0-3.0   TTR:  31.7 % (5.3 mo)   INR used for dosin.50 (2021)   Warfarin maintenance plan:  9 mg (6 mg x 1.5) every Thu; 6 mg (6 mg x 1) all other days   Weekly warfarin total:  45 mg   Plan last modified:  Kinjal Gant PharmD (2021)   Next INR check:  10/7/2021   Target end date:  Indefinite    Indications    Paroxysmal A-fib (HCC) [I48.0]             Anticoagulation Episode Summary     INR check location:      Preferred lab:      Send INR reminders to:      Comments:  [facility]          Refer to Anticoagulation Patient Findings for HPI  Patient Findings     Negatives:  Signs/symptoms of thrombosis, Signs/symptoms of bleeding, Laboratory test error suspected, Change in health, Change in alcohol use, Change in activity, Upcoming invasive procedure, Emergency department visit, Upcoming dental procedure, Missed doses, Extra doses, Change in medications, Change in diet/appetite, Hospital admission, Bruising, Other complaints          Spoke with pt.  INR is subtherapeutic.     Pt verifies warfarin weekly dosing.     Will have pt increase weekly regimen    Repeat INR in 1 week(s) via [facility].     Kinjal Gant, HildaD

## 2021-10-06 ENCOUNTER — HOSPITAL ENCOUNTER (INPATIENT)
Facility: MEDICAL CENTER | Age: 46
LOS: 6 days | DRG: 177 | End: 2021-10-13
Attending: EMERGENCY MEDICINE | Admitting: HOSPITALIST
Payer: MEDICAID

## 2021-10-06 ENCOUNTER — APPOINTMENT (OUTPATIENT)
Dept: RADIOLOGY | Facility: MEDICAL CENTER | Age: 46
DRG: 177 | End: 2021-10-06
Attending: EMERGENCY MEDICINE
Payer: MEDICAID

## 2021-10-06 DIAGNOSIS — U07.1 PNEUMONIA DUE TO COVID-19 VIRUS: ICD-10-CM

## 2021-10-06 DIAGNOSIS — J18.1 LOBAR PNEUMONIA (HCC): Chronic | ICD-10-CM

## 2021-10-06 DIAGNOSIS — E66.01 CLASS 3 SEVERE OBESITY DUE TO EXCESS CALORIES WITHOUT SERIOUS COMORBIDITY WITH BODY MASS INDEX (BMI) OF 45.0 TO 49.9 IN ADULT (HCC): ICD-10-CM

## 2021-10-06 DIAGNOSIS — J12.82 PNEUMONIA DUE TO COVID-19 VIRUS: ICD-10-CM

## 2021-10-06 DIAGNOSIS — J96.12 CHRONIC RESPIRATORY FAILURE WITH HYPOXIA AND HYPERCAPNIA (HCC): ICD-10-CM

## 2021-10-06 DIAGNOSIS — J45.50 SEVERE PERSISTENT ASTHMA WITHOUT COMPLICATION: ICD-10-CM

## 2021-10-06 DIAGNOSIS — R53.81 DEBILITATED: ICD-10-CM

## 2021-10-06 DIAGNOSIS — J96.11 CHRONIC RESPIRATORY FAILURE WITH HYPOXIA (HCC): ICD-10-CM

## 2021-10-06 DIAGNOSIS — I48.0 PAROXYSMAL ATRIAL FIBRILLATION (HCC): ICD-10-CM

## 2021-10-06 DIAGNOSIS — J96.11 CHRONIC RESPIRATORY FAILURE WITH HYPOXIA AND HYPERCAPNIA (HCC): ICD-10-CM

## 2021-10-06 DIAGNOSIS — E66.2 OBESITY HYPOVENTILATION SYNDROME (HCC): ICD-10-CM

## 2021-10-06 DIAGNOSIS — G47.33 OSA (OBSTRUCTIVE SLEEP APNEA): ICD-10-CM

## 2021-10-06 PROBLEM — J96.90 RESPIRATORY FAILURE (HCC): Status: ACTIVE | Noted: 2021-10-06

## 2021-10-06 LAB
ALBUMIN SERPL BCP-MCNC: 4.1 G/DL (ref 3.2–4.9)
ALBUMIN/GLOB SERPL: 1.4 G/DL
ALP SERPL-CCNC: 148 U/L (ref 30–99)
ALT SERPL-CCNC: 13 U/L (ref 2–50)
ANION GAP SERPL CALC-SCNC: 11 MMOL/L (ref 7–16)
ANION GAP SERPL CALC-SCNC: 13 MMOL/L (ref 7–16)
ANISOCYTOSIS BLD QL SMEAR: ABNORMAL
AST SERPL-CCNC: 16 U/L (ref 12–45)
BASOPHILS # BLD AUTO: 0.2 % (ref 0–1.8)
BASOPHILS # BLD: 0.01 K/UL (ref 0–0.12)
BILIRUB SERPL-MCNC: 0.7 MG/DL (ref 0.1–1.5)
BUN SERPL-MCNC: 11 MG/DL (ref 8–22)
BUN SERPL-MCNC: 9 MG/DL (ref 8–22)
CALCIUM SERPL-MCNC: 9 MG/DL (ref 8.5–10.5)
CALCIUM SERPL-MCNC: 9.5 MG/DL (ref 8.5–10.5)
CHLORIDE SERPL-SCNC: 104 MMOL/L (ref 96–112)
CHLORIDE SERPL-SCNC: 105 MMOL/L (ref 96–112)
CO2 SERPL-SCNC: 27 MMOL/L (ref 20–33)
CO2 SERPL-SCNC: 29 MMOL/L (ref 20–33)
COMMENT 1642: NORMAL
CREAT SERPL-MCNC: 0.77 MG/DL (ref 0.5–1.4)
CREAT SERPL-MCNC: 0.98 MG/DL (ref 0.5–1.4)
EOSINOPHIL # BLD AUTO: 0.07 K/UL (ref 0–0.51)
EOSINOPHIL NFR BLD: 1.1 % (ref 0–6.9)
ERYTHROCYTE [DISTWIDTH] IN BLOOD BY AUTOMATED COUNT: 52.6 FL (ref 35.9–50)
FLUAV RNA SPEC QL NAA+PROBE: NEGATIVE
FLUBV RNA SPEC QL NAA+PROBE: NEGATIVE
GLOBULIN SER CALC-MCNC: 3 G/DL (ref 1.9–3.5)
GLUCOSE SERPL-MCNC: 166 MG/DL (ref 65–99)
GLUCOSE SERPL-MCNC: 90 MG/DL (ref 65–99)
HCT VFR BLD AUTO: 39.1 % (ref 37–47)
HGB BLD-MCNC: 11.7 G/DL (ref 12–16)
IMM GRANULOCYTES # BLD AUTO: 0.02 K/UL (ref 0–0.11)
IMM GRANULOCYTES NFR BLD AUTO: 0.3 % (ref 0–0.9)
INR PPP: 1.76 (ref 0.87–1.13)
LYMPHOCYTES # BLD AUTO: 0.52 K/UL (ref 1–4.8)
LYMPHOCYTES NFR BLD: 8.5 % (ref 22–41)
MCH RBC QN AUTO: 26.5 PG (ref 27–33)
MCHC RBC AUTO-ENTMCNC: 29.9 G/DL (ref 33.6–35)
MCV RBC AUTO: 88.7 FL (ref 81.4–97.8)
MICROCYTES BLD QL SMEAR: ABNORMAL
MONOCYTES # BLD AUTO: 0.56 K/UL (ref 0–0.85)
MONOCYTES NFR BLD AUTO: 9.1 % (ref 0–13.4)
MORPHOLOGY BLD-IMP: NORMAL
NEUTROPHILS # BLD AUTO: 4.95 K/UL (ref 2–7.15)
NEUTROPHILS NFR BLD: 80.8 % (ref 44–72)
NRBC # BLD AUTO: 0 K/UL
NRBC BLD-RTO: 0 /100 WBC
NT-PROBNP SERPL IA-MCNC: 533 PG/ML (ref 0–125)
OVALOCYTES BLD QL SMEAR: NORMAL
PLATELET # BLD AUTO: 222 K/UL (ref 164–446)
PLATELET BLD QL SMEAR: NORMAL
PMV BLD AUTO: 10.5 FL (ref 9–12.9)
POTASSIUM SERPL-SCNC: 4.3 MMOL/L (ref 3.6–5.5)
POTASSIUM SERPL-SCNC: 4.4 MMOL/L (ref 3.6–5.5)
PROT SERPL-MCNC: 7.1 G/DL (ref 6–8.2)
PROTHROMBIN TIME: 20 SEC (ref 12–14.6)
RBC # BLD AUTO: 4.41 M/UL (ref 4.2–5.4)
RBC BLD AUTO: PRESENT
RSV RNA SPEC QL NAA+PROBE: NEGATIVE
SARS-COV-2 RNA RESP QL NAA+PROBE: DETECTED
SODIUM SERPL-SCNC: 143 MMOL/L (ref 135–145)
SODIUM SERPL-SCNC: 146 MMOL/L (ref 135–145)
SPECIMEN SOURCE: ABNORMAL
SPHEROCYTES BLD QL SMEAR: NORMAL
TROPONIN T SERPL-MCNC: 28 NG/L (ref 6–19)
TROPONIN T SERPL-MCNC: 35 NG/L (ref 6–19)
WBC # BLD AUTO: 6.1 K/UL (ref 4.8–10.8)

## 2021-10-06 PROCEDURE — 85610 PROTHROMBIN TIME: CPT

## 2021-10-06 PROCEDURE — 700102 HCHG RX REV CODE 250 W/ 637 OVERRIDE(OP): Performed by: STUDENT IN AN ORGANIZED HEALTH CARE EDUCATION/TRAINING PROGRAM

## 2021-10-06 PROCEDURE — 85025 COMPLETE CBC W/AUTO DIFF WBC: CPT

## 2021-10-06 PROCEDURE — 700111 HCHG RX REV CODE 636 W/ 250 OVERRIDE (IP): Performed by: STUDENT IN AN ORGANIZED HEALTH CARE EDUCATION/TRAINING PROGRAM

## 2021-10-06 PROCEDURE — 84484 ASSAY OF TROPONIN QUANT: CPT

## 2021-10-06 PROCEDURE — 80053 COMPREHEN METABOLIC PANEL: CPT

## 2021-10-06 PROCEDURE — A9270 NON-COVERED ITEM OR SERVICE: HCPCS | Performed by: EMERGENCY MEDICINE

## 2021-10-06 PROCEDURE — G0378 HOSPITAL OBSERVATION PER HR: HCPCS

## 2021-10-06 PROCEDURE — 71045 X-RAY EXAM CHEST 1 VIEW: CPT

## 2021-10-06 PROCEDURE — 80048 BASIC METABOLIC PNL TOTAL CA: CPT

## 2021-10-06 PROCEDURE — A9270 NON-COVERED ITEM OR SERVICE: HCPCS | Performed by: STUDENT IN AN ORGANIZED HEALTH CARE EDUCATION/TRAINING PROGRAM

## 2021-10-06 PROCEDURE — 99285 EMERGENCY DEPT VISIT HI MDM: CPT

## 2021-10-06 PROCEDURE — 700111 HCHG RX REV CODE 636 W/ 250 OVERRIDE (IP): Performed by: EMERGENCY MEDICINE

## 2021-10-06 PROCEDURE — 700102 HCHG RX REV CODE 250 W/ 637 OVERRIDE(OP): Performed by: EMERGENCY MEDICINE

## 2021-10-06 PROCEDURE — 83880 ASSAY OF NATRIURETIC PEPTIDE: CPT

## 2021-10-06 PROCEDURE — 96374 THER/PROPH/DIAG INJ IV PUSH: CPT

## 2021-10-06 PROCEDURE — 96375 TX/PRO/DX INJ NEW DRUG ADDON: CPT

## 2021-10-06 PROCEDURE — 99219 PR INITIAL OBSERVATION CARE,LEVL II: CPT | Mod: CS,GC | Performed by: HOSPITALIST

## 2021-10-06 PROCEDURE — 96372 THER/PROPH/DIAG INJ SC/IM: CPT | Mod: XU

## 2021-10-06 PROCEDURE — 0241U HCHG SARS-COV-2 COVID-19 NFCT DS RESP RNA 4 TRGT MIC: CPT

## 2021-10-06 PROCEDURE — C9803 HOPD COVID-19 SPEC COLLECT: HCPCS | Performed by: EMERGENCY MEDICINE

## 2021-10-06 RX ORDER — FUROSEMIDE 10 MG/ML
40 INJECTION INTRAMUSCULAR; INTRAVENOUS ONCE
Status: COMPLETED | OUTPATIENT
Start: 2021-10-06 | End: 2021-10-06

## 2021-10-06 RX ORDER — WARFARIN SODIUM 6 MG/1
6 TABLET ORAL DAILY
Status: DISCONTINUED | OUTPATIENT
Start: 2021-10-07 | End: 2021-10-10

## 2021-10-06 RX ORDER — ACETAMINOPHEN 325 MG/1
650 TABLET ORAL EVERY 6 HOURS PRN
Status: DISCONTINUED | OUTPATIENT
Start: 2021-10-06 | End: 2021-10-13 | Stop reason: HOSPADM

## 2021-10-06 RX ORDER — DOXYCYCLINE 100 MG/1
100 TABLET ORAL ONCE
Status: COMPLETED | OUTPATIENT
Start: 2021-10-06 | End: 2021-10-06

## 2021-10-06 RX ORDER — ARIPIPRAZOLE 15 MG/1
30 TABLET ORAL EVERY MORNING
Status: DISCONTINUED | OUTPATIENT
Start: 2021-10-07 | End: 2021-10-13 | Stop reason: HOSPADM

## 2021-10-06 RX ORDER — ONDANSETRON 2 MG/ML
4 INJECTION INTRAMUSCULAR; INTRAVENOUS EVERY 4 HOURS PRN
Status: DISCONTINUED | OUTPATIENT
Start: 2021-10-06 | End: 2021-10-13 | Stop reason: HOSPADM

## 2021-10-06 RX ORDER — ALBUTEROL SULFATE 90 UG/1
6 AEROSOL, METERED RESPIRATORY (INHALATION)
Status: COMPLETED | OUTPATIENT
Start: 2021-10-06 | End: 2021-10-06

## 2021-10-06 RX ORDER — KETOCONAZOLE 20 MG/ML
1 SHAMPOO TOPICAL DAILY
Status: ON HOLD | COMMUNITY
Start: 2021-08-22 | End: 2021-10-13

## 2021-10-06 RX ORDER — ALBUTEROL SULFATE 90 UG/1
2 AEROSOL, METERED RESPIRATORY (INHALATION) EVERY 6 HOURS PRN
Status: DISCONTINUED | OUTPATIENT
Start: 2021-10-06 | End: 2021-10-13

## 2021-10-06 RX ORDER — FLUTICASONE PROPIONATE 50 MCG
2 SPRAY, SUSPENSION (ML) NASAL 2 TIMES DAILY
Status: DISCONTINUED | OUTPATIENT
Start: 2021-10-06 | End: 2021-10-13 | Stop reason: HOSPADM

## 2021-10-06 RX ORDER — WARFARIN SODIUM 10 MG/1
10 TABLET ORAL ONCE
Status: COMPLETED | OUTPATIENT
Start: 2021-10-06 | End: 2021-10-06

## 2021-10-06 RX ORDER — METHYLPREDNISOLONE SODIUM SUCCINATE 125 MG/2ML
125 INJECTION, POWDER, LYOPHILIZED, FOR SOLUTION INTRAMUSCULAR; INTRAVENOUS ONCE
Status: COMPLETED | OUTPATIENT
Start: 2021-10-06 | End: 2021-10-06

## 2021-10-06 RX ORDER — MONTELUKAST SODIUM 10 MG/1
10 TABLET ORAL
Status: DISCONTINUED | OUTPATIENT
Start: 2021-10-06 | End: 2021-10-13 | Stop reason: HOSPADM

## 2021-10-06 RX ORDER — WARFARIN SODIUM 6 MG/1
6 TABLET ORAL DAILY
Status: DISCONTINUED | OUTPATIENT
Start: 2021-10-07 | End: 2021-10-06

## 2021-10-06 RX ORDER — TIOTROPIUM BROMIDE 18 UG/1
1 CAPSULE ORAL; RESPIRATORY (INHALATION) DAILY
Status: DISCONTINUED | OUTPATIENT
Start: 2021-10-07 | End: 2021-10-06

## 2021-10-06 RX ORDER — AZITHROMYCIN 250 MG/1
500 TABLET, FILM COATED ORAL DAILY
Status: DISCONTINUED | OUTPATIENT
Start: 2021-10-07 | End: 2021-10-08

## 2021-10-06 RX ORDER — ATORVASTATIN CALCIUM 20 MG/1
20 TABLET, FILM COATED ORAL
COMMUNITY
End: 2022-12-01 | Stop reason: SDUPTHER

## 2021-10-06 RX ORDER — CEFTRIAXONE 1 G/1
1000 INJECTION, POWDER, FOR SOLUTION INTRAMUSCULAR; INTRAVENOUS DAILY
Status: DISCONTINUED | OUTPATIENT
Start: 2021-10-07 | End: 2021-10-06

## 2021-10-06 RX ORDER — CEFTRIAXONE 2 G/1
2 INJECTION, POWDER, FOR SOLUTION INTRAMUSCULAR; INTRAVENOUS ONCE
Status: COMPLETED | OUTPATIENT
Start: 2021-10-06 | End: 2021-10-06

## 2021-10-06 RX ORDER — BISACODYL 10 MG
10 SUPPOSITORY, RECTAL RECTAL
Status: DISCONTINUED | OUTPATIENT
Start: 2021-10-06 | End: 2021-10-13 | Stop reason: HOSPADM

## 2021-10-06 RX ORDER — FAMOTIDINE 20 MG/1
40 TABLET, FILM COATED ORAL DAILY
Status: DISCONTINUED | OUTPATIENT
Start: 2021-10-07 | End: 2021-10-06

## 2021-10-06 RX ORDER — ATORVASTATIN CALCIUM 20 MG/1
20 TABLET, FILM COATED ORAL EVERY EVENING
Status: DISCONTINUED | OUTPATIENT
Start: 2021-10-06 | End: 2021-10-13 | Stop reason: HOSPADM

## 2021-10-06 RX ORDER — TRAZODONE HYDROCHLORIDE 100 MG/1
50 TABLET ORAL
Status: DISCONTINUED | OUTPATIENT
Start: 2021-10-06 | End: 2021-10-13 | Stop reason: HOSPADM

## 2021-10-06 RX ORDER — AZITHROMYCIN 250 MG/1
500 TABLET, FILM COATED ORAL DAILY
Status: DISCONTINUED | OUTPATIENT
Start: 2021-10-06 | End: 2021-10-06

## 2021-10-06 RX ORDER — NITROGLYCERIN 0.4 MG/1
0.4 TABLET SUBLINGUAL PRN
Status: DISCONTINUED | OUTPATIENT
Start: 2021-10-06 | End: 2021-10-13 | Stop reason: HOSPADM

## 2021-10-06 RX ORDER — FUROSEMIDE 10 MG/ML
40 INJECTION INTRAMUSCULAR; INTRAVENOUS
Status: DISCONTINUED | OUTPATIENT
Start: 2021-10-06 | End: 2021-10-06

## 2021-10-06 RX ORDER — POLYETHYLENE GLYCOL 3350 17 G/17G
1 POWDER, FOR SOLUTION ORAL
Status: DISCONTINUED | OUTPATIENT
Start: 2021-10-06 | End: 2021-10-13 | Stop reason: HOSPADM

## 2021-10-06 RX ORDER — NICOTINE 21 MG/24HR
14 PATCH, TRANSDERMAL 24 HOURS TRANSDERMAL
Status: DISCONTINUED | OUTPATIENT
Start: 2021-10-06 | End: 2021-10-13 | Stop reason: HOSPADM

## 2021-10-06 RX ORDER — AMOXICILLIN 250 MG
2 CAPSULE ORAL 2 TIMES DAILY
Status: DISCONTINUED | OUTPATIENT
Start: 2021-10-06 | End: 2021-10-13 | Stop reason: HOSPADM

## 2021-10-06 RX ADMIN — ATORVASTATIN CALCIUM 20 MG: 20 TABLET, FILM COATED ORAL at 23:29

## 2021-10-06 RX ADMIN — METHYLPREDNISOLONE SODIUM SUCCINATE 125 MG: 125 INJECTION, POWDER, FOR SOLUTION INTRAMUSCULAR; INTRAVENOUS at 12:01

## 2021-10-06 RX ADMIN — ENOXAPARIN SODIUM 40 MG: 40 INJECTION SUBCUTANEOUS at 17:27

## 2021-10-06 RX ADMIN — CEFTRIAXONE SODIUM 2 G: 2 INJECTION, POWDER, FOR SOLUTION INTRAMUSCULAR; INTRAVENOUS at 13:17

## 2021-10-06 RX ADMIN — WARFARIN SODIUM 10 MG: 10 TABLET ORAL at 23:28

## 2021-10-06 RX ADMIN — ALBUTEROL SULFATE 6 PUFF: 90 AEROSOL, METERED RESPIRATORY (INHALATION) at 11:59

## 2021-10-06 RX ADMIN — TRAZODONE HYDROCHLORIDE 50 MG: 100 TABLET ORAL at 21:05

## 2021-10-06 RX ADMIN — DOXYCYCLINE 100 MG: 100 TABLET, FILM COATED ORAL at 11:59

## 2021-10-06 RX ADMIN — FUROSEMIDE 40 MG: 10 INJECTION, SOLUTION INTRAMUSCULAR; INTRAVENOUS at 17:26

## 2021-10-06 RX ADMIN — NICOTINE 14 MG: 14 PATCH TRANSDERMAL at 17:26

## 2021-10-06 RX ADMIN — MONTELUKAST 10 MG: 10 TABLET, FILM COATED ORAL at 21:05

## 2021-10-06 ASSESSMENT — ENCOUNTER SYMPTOMS
DEPRESSION: 0
SHORTNESS OF BREATH: 0
SPUTUM PRODUCTION: 1
NAUSEA: 0
BLURRED VISION: 0
MYALGIAS: 0
PHOTOPHOBIA: 0
ABDOMINAL PAIN: 0
TREMORS: 0
FEVER: 1
DOUBLE VISION: 0
DIZZINESS: 0
VOMITING: 0
HEADACHES: 1
NECK PAIN: 0
COUGH: 1
PALPITATIONS: 0
CHILLS: 1

## 2021-10-06 ASSESSMENT — CHA2DS2 SCORE
PRIOR STROKE OR TIA OR THROMBOEMBOLISM: YES
SEX: FEMALE
VASCULAR DISEASE: NO
CHA2DS2 VASC SCORE: 6
DIABETES: YES
AGE 75 OR GREATER: NO
CHF OR LEFT VENTRICULAR DYSFUNCTION: YES
HYPERTENSION: YES
AGE 65 TO 74: NO

## 2021-10-06 ASSESSMENT — COGNITIVE AND FUNCTIONAL STATUS - GENERAL
TOILETING: A LITTLE
DAILY ACTIVITIY SCORE: 19
DRESSING REGULAR LOWER BODY CLOTHING: A LOT
WALKING IN HOSPITAL ROOM: A LOT
CLIMB 3 TO 5 STEPS WITH RAILING: A LOT
SUGGESTED CMS G CODE MODIFIER DAILY ACTIVITY: CK
DRESSING REGULAR UPPER BODY CLOTHING: A LITTLE
HELP NEEDED FOR BATHING: A LITTLE
TURNING FROM BACK TO SIDE WHILE IN FLAT BAD: A LOT
SUGGESTED CMS G CODE MODIFIER MOBILITY: CL
MOVING FROM LYING ON BACK TO SITTING ON SIDE OF FLAT BED: A LOT
MOVING TO AND FROM BED TO CHAIR: A LOT
MOBILITY SCORE: 12
STANDING UP FROM CHAIR USING ARMS: A LOT

## 2021-10-06 ASSESSMENT — FIBROSIS 4 INDEX: FIB4 SCORE: 0.66

## 2021-10-06 NOTE — ED NOTES
Lab called with critical result of Covid positive at 1355. Critical lab result read back to lab.   Dr. Justice notified of critical lab result at 1355.

## 2021-10-06 NOTE — PROGRESS NOTES
Daily Progress Note:     Date of Service: 10/6/2021  Primary Team: [facility] IM Blue Team   Attending: Dr. Frankel  Senior Resident: Dr. Dorantes  Intern: Dr. Michel  Contact:  [phone]    Chief Complaint: SOB    Subjective:  Patient is a 46 year old female with past medical history of COPD on O2 (2L baseline, PFTs 9/2021 with mixed restrictive and obstructive disease, and ratio of 86%), bipolar 2 disorder, schizophrenia, obesity hypoventilation syndrome with BMI 53, KATHIA (but not on CPAP because it's broken), paroxysmal A fib (on coumadin chronically), COVID vaccine Pfizer in April and May 2021, recent stroke with residual numbness and tingling of left forehead 9/2021.     Patient reports that 6 days ago, felt headache and dizziness over the weekend. [facility] assessed her over the weekend, and steady since onset. Patient was feeling off today, so wanted to come in and know if COVID vs flu shot. Patient reports that headache is waxing and waning since onset 6 days ago. Tylenol worked for headache. Headache is frontal headache, not a headband around head, symmetrical, 1/10.     Patient also reports cough with yellow sputum. She reports subjective fevers at home. Denies any trouble swallowing/ choking.     Patient reports 30 x (2/7) pack-year history of smoking.  Denies alcohol use.  Denies any other drug use.  Lives by herself.  Has many sick contacts in her apartment building.  Patient reports that at baseline, she is able to cook and clean herself, but needs help to leave house. Reports being able to walk 1/2 of a grocery store before stopping due to SOB.     Review of Systems:   Review of Systems   Constitutional: Positive for chills, fever (Subjective fever) and malaise/fatigue.   HENT: Negative for ear pain, hearing loss and tinnitus.    Eyes: Negative for blurred vision, double vision and photophobia.   Respiratory: Positive for cough and sputum production (yellow). Negative for shortness of breath.     Cardiovascular: Negative for chest pain and palpitations.   Gastrointestinal: Negative for abdominal pain, nausea and vomiting.   Genitourinary: Negative for dysuria, frequency and urgency.   Musculoskeletal: Negative for myalgias and neck pain.   Neurological: Positive for headaches. Negative for dizziness and tremors.   Psychiatric/Behavioral: Negative for depression and suicidal ideas.   All other systems reviewed and are negative.    Objective Data:   Vitals:   Temp:  [37.1 °C (98.8 °F)] 37.1 °C (98.8 °F)  Pulse:  [74-89] 79  Resp:  [17-23] 23  BP: (107-138)/() 117/56  SpO2:  [93 %-97 %] 97 %    Physical Exam:  Physical Exam  Constitutional:       Appearance: She is obese.   HENT:      Head: Normocephalic and atraumatic.      Mouth/Throat:      Mouth: Mucous membranes are moist.   Cardiovascular:      Rate and Rhythm: Normal rate and regular rhythm.      Pulses: Normal pulses.      Heart sounds: No murmur heard.     Pulmonary:      Effort: Pulmonary effort is normal. No respiratory distress.      Breath sounds: No wheezing or rales.   Abdominal:      General: Abdomen is flat. Bowel sounds are normal. There is no distension.      Tenderness: There is no abdominal tenderness.   Musculoskeletal:         General: No swelling or deformity. Normal range of motion.      Cervical back: Normal range of motion. No rigidity.      Right lower leg: Edema (1+ pitting edema) present.      Left lower leg: Edema (1+ pitting edema) present.   Lymphadenopathy:      Cervical: No cervical adenopathy.   Skin:     General: Skin is warm.      Capillary Refill: Capillary refill takes less than 2 seconds.   Neurological:      General: No focal deficit present.      Mental Status: She is alert and oriented to person, place, and time.      Sensory: Sensory deficit (Left-sided forehead numbness and tingling.  Normal motor function) present.   Psychiatric:         Mood and Affect: Mood normal.         Behavior: Behavior normal.          Thought Content: Thought content normal.         Judgment: Judgment normal.     Labs:  CBC normal, with WBC 6.1. HGB 11.7  CMP normal. Somewhat elevated alk phos  Troponin 35  INR 1.76  COVID positive    Micro: N/A    EKG:   Sinus rhythm   Atrial premature complex   Probable left atrial enlargement   Low voltage, precordial leads   ST elev, probable normal early repol pattern     Imaging:   CXR: Airspace opacity in the right midlung, likely pneumonia    Meds:  Current Outpatient Medications   Medication Instructions   • acetaminophen (TYLENOL) 500 mg, Oral, EVERY 6 HOURS PRN   • albuterol 108 (90 Base) MCG/ACT Aero Soln inhalation aerosol 2 Puffs, Inhalation, EVERY 6 HOURS PRN   • aripiprazole (ABILIFY) 30 mg, Oral, EVERY MORNING   • atorvastatin (LIPITOR) 20 mg, Oral, DAILY   • famotidine (PEPCID) 40 mg, Oral, DAILY   • fluticasone (FLONASE) 50 MCG/ACT nasal spray 1-2 Sprays, Nasal, 2 TIMES DAILY   • furosemide (LASIX) 20 mg, Oral, 2 TIMES DAILY   • Home Care Oxygen 2 L/min, Inhalation, CONTINUOUS, Oxygen is at 2L/NC continuously<BR>Oxygen dose range: 2 L/min<BR>Respiratory route via: Nasal Cannula <BR>Oxygen supplier:[employer]<BR><BR>   • ketoconazole (NIZORAL) 2 % shampoo 1 Application, Topical, DAILY   • montelukast (SINGULAIR) 10 mg, Oral, EVERY BEDTIME   • nitroglycerin (NITROSTAT) 0.4 mg, Sublingual, PRN   • NON SPECIFIED 1 Application, Topical, 1 TIME DAILY PRN, Anti-fungal powder   • ondansetron (ZOFRAN ODT) 4 mg, Oral, EVERY 6 HOURS PRN   • potassium chloride SA (KDUR) 20 MEQ Tab CR 20 mEq, Oral, DAILY   • tiotropium (SPIRIVA HANDIHALER) 18 MCG Cap 1 Capsule, Inhalation, DAILY   • traZODone (DESYREL)  mg, Oral, DAILY   • warfarin (COUMADIN) 6 MG Tab Take one-half tablet (3mg) on Mondays, and 1 tablet (6mg) all other days or as directed by the [facility]      Scheduled Medications   Medication Dose Frequency   • senna-docusate  2 Tablet BID   • enoxaparin  40 mg DAILY   • nicotine  14 mg  Daily-0600   • [START ON 10/7/2021] cefTRIAXone (ROCEPHIN) IV  2 g Q24HRS   • [START ON 10/7/2021] azithromycin  500 mg DAILY   • [START ON 10/7/2021] aripiprazole  30 mg QAM   • [START ON 10/7/2021] atorvastatin  20 mg DAILY   • [START ON 10/7/2021] famotidine  40 mg DAILY   • fluticasone  1-2 Spray BID   • montelukast  10 mg QHS   • [START ON 10/7/2021] warfarin  6 mg DAILY   • [START ON 10/7/2021] traZODone   mg DAILY   • [START ON 10/7/2021] tiotropium  1 Capsule DAILY      Problem Representation:   Lorene Haro is a 45 yo female with cough and generalized weakness. Found to have lobar pneumonia, viral versus bacterial.  She has past medical history of obesity hypoventilation syndrome, paroxysmal A. fib, KATHIA.  Found to be COVID positive. We will continue to treat with antibiotics, diuresis.  Will follow for electrolyte abnormalities secondary to diuresis telemetry.  No evidence of acute hypoxic respiratory failure, as she is at her baseline O2 requirements    * Lobar pneumonia (HCC)- (present on admission)  Assessment & Plan  Viral vs bacterial vs bronchitis  Patient reports productive cough with yellow sputum, subjective fevers, generalized weakness.  Received 1x dose of ceftriaxone and 1x dose of doxycycline in ER  We will start empiric treatment with ceftriaxone and azithromycin  Patient tested positive for COVID  She does not meet criteria for treatment for dexamethasone or remdesivir    Lower extremity edema- (present on admission)  Assessment & Plan  1+ pitting edema of bilateral lower extremities  Will diurese with Lasix 40  Will order BNP in 6 hours  We will need to measure ins and outs accurately for next 6 hours to evaluate for adequate diuresis    Chronic hypoxic respiratory failure (HCC)- (present on admission)  Assessment & Plan  Patient reports that she is chronically on 2 L oxygen.  She has been saturating 98% on her baseline, in the ED.  No desaturations.  No acute hypoxic respiratory  failure.    Obesity hypoventilation syndrome (HCC)- (present on admission)  Assessment & Plan  PFTs 9/2021 with ratio 86.    Pulmonologist assessment reports mixed restrictive and obstructive disease    KATHIA (obstructive sleep apnea)- (present on admission)  Assessment & Plan  Patient reports being diagnosed with KATHIA, but no recent CPAP use as it is broken.  Will order RT protocol to see if she will be able to use CPAP while admitted    Code status: Full code  Diet: Cardiac  Lines/ tubes/ drains:   IV fluids:   Abx (dates): Ceftriaxone azithromycin  GI ppx: Famotidine  DVT ppx: Lovenox  Dispo:

## 2021-10-06 NOTE — ED NOTES
Assisted patient to reposition on gurney. Food requested. Boxed meal provided. No further needs noted.

## 2021-10-06 NOTE — ED NOTES
Med Rec completed per patient   Allergies reviewed  Patient finished a 10 day course of Clindamycin 300 mg (300 mg every day) on 9/15/21 and a 10 day course of Doxycycline 100mg (100 mg daily) on 9/25/21.

## 2021-10-06 NOTE — ED TRIAGE NOTES
Pt to triage with   Chief Complaint   Patient presents with   • Difficulty Breathing     worsening since friday, on home 02 2 liters, hasn't needed to increase o2 demand.  negative covid tests, has had first covid shot.    • Cough     yellow productive cough      Pt Informed regarding triage process and verbalized understanding to inform triage tech or RN for any changes in condition. Placed in [facility].

## 2021-10-06 NOTE — ED NOTES
IV access placed. Blood drawn and sent to lab. Medicated patient per MAR. Patient boosted in bed. Patient denies further needs. Call light within reach.

## 2021-10-06 NOTE — ED PROVIDER NOTES
ED Provider Note    CHIEF COMPLAINT  unable to get out of bed.    HPI  Lorenesamina Haro is a 46 y.o. female who presents with a chief complaint of unable to get out of bed.  Duration has been today.  She describes unable to get out of bed.  It was exacerbated by her cough that is been going on and shortness of breath for since Friday.  No alleviating factors.  No associated new leg swelling.  Associate with a cough with yellow phlegm.  And no fever some she body chills.    Her baseline is that she is able to get a bed she lives by herself able to care for self she is on 2 L nasal cannula oxygen.  She does have a rehab person come in every day for about half an hour she does have someone come in on Thursdays to check her at this point she does not have anyone else that lives with her.    Was the fact that she could get out of bed is what made her call EMS.  She states she has had the vaccine shots.    REVIEW OF SYSTEMS  General: Positive chills  Eyes: No eye discharge. No eye pain.  Ear nose throat: No sore throat or  trouble swallowing.  Pulmonary: Positive chronic shortness of breath some worsening.  See above  Cardiovascular: No chest pain or chest pressure.  GI: No abdominal pain nausea or vomiting.  : No dysuria or hematuria  Dermatologic: No rashes. No abrasions.  Neurologic: No weakness or numbness.    All other systems are negative      PAST MEDICAL HISTORY  Past Medical History:   Diagnosis Date   • Asthma    • Bipolar 2 disorder (HCC)    • Chickenpox    • Chronic obstructive pulmonary disease (HCC)    • Hypertension    • On home oxygen therapy    • Other psychological stress    • Schizophrenic disorder (HCC)    • Yeast infection of the skin 4/1/2021       FAMILY HISTORY  Family History   Problem Relation Age of Onset   • No Known Problems Mother    • Cancer Sister        SOCIAL HISTORY  Social History     Socioeconomic History   • Marital status: Single     Spouse name: Not on file   • Number of  children: Not on file   • Years of education: Not on file   • Highest education level: Not on file   Occupational History   • Not on file   Tobacco Use   • Smoking status: Current Every Day Smoker     Packs/day: 0.50     Years: 20.00     Pack years: 10.00     Types: Cigarettes     Last attempt to quit: 2021     Years since quittin.1   • Smokeless tobacco: Never Used   Vaping Use   • Vaping Use: Never used   Substance and Sexual Activity   • Alcohol use: No   • Drug use: No   • Sexual activity: Not Currently   Other Topics Concern   • Not on file   Social History Narrative    From [place]     Social Determinants of Health     Financial Resource Strain: Medium Risk   • Difficulty of Paying Living Expenses: Somewhat hard   Food Insecurity: Food Insecurity Present   • Worried About Running Out of Food in the Last Year: Sometimes true   • Ran Out of Food in the Last Year: Sometimes true   Transportation Needs: No Transportation Needs   • Lack of Transportation (Medical): No   • Lack of Transportation (Non-Medical): No   Physical Activity:    • Days of Exercise per Week:    • Minutes of Exercise per Session:    Stress:    • Feeling of Stress :    Social Connections:    • Frequency of Communication with Friends and Family:    • Frequency of Social Gatherings with Friends and Family:    • Attends Je[employer] witness Services:    • Active Member of Clubs or Organizations:    • Attends Club or Organization Meetings:    • Marital Status:    Intimate Partner Violence:    • Fear of Current or Ex-Partner:    • Emotionally Abused:    • Physically Abused:    • Sexually Abused:        SURGICAL HISTORY  Past Surgical History:   Procedure Laterality Date   • CHOLECYSTECTOMY     • COLECTOMY     • HYSTERECTOMY LAPAROSCOPY     • KNEE ARTHROSCOPY Left        CURRENT MEDICATIONS  Home Medications     Reviewed by Amber Dahl R.N. (Registered Nurse) on 10/06/21 at 0934  Med List Status: Partial   Medication Last Dose Status  "  acetaminophen (TYLENOL) 500 MG Tab  Active   albuterol 108 (90 Base) MCG/ACT Aero Soln inhalation aerosol  Active   aripiprazole (ABILIFY) 30 MG tablet  Active   atorvastatin (LIPITOR) 20 MG Tab  Active   diclofenac sodium 3 % Gel  Active   famotidine (PEPCID) 40 MG Tab  Active   fluticasone (FLONASE) 50 MCG/ACT nasal spray  Active   furosemide (LASIX) 20 MG Tab  Active   Home Care Oxygen  Active   montelukast (SINGULAIR) 10 MG Tab  Active   nitroglycerin (NITROSTAT) 0.4 MG SL Tab  Active   ondansetron (ZOFRAN ODT) 4 MG TABLET DISPERSIBLE  Active   potassium chloride SA (KDUR) 20 MEQ Tab CR  Active   tiotropium (SPIRIVA HANDIHALER) 18 MCG Cap  Active   traZODone (DESYREL) 50 MG Tab  Active   warfarin (COUMADIN) 6 MG Tab  Active                ALLERGIES  Allergies   Allergen Reactions   • Amoxicillin Rash and Itching     Tolerates cephalosporins, pt reports that she gets a rash all over her body and gets itchy   • Penicillin G Rash and Itching     pt reports that she gets a rash all over her body and gets itchy       PHYSICAL EXAM  VITAL SIGNS: /58   Pulse 74   Temp 37.1 °C (98.8 °F) (Temporal)   Resp 17   Ht 1.549 m (5' 1\")   Wt (!) 129 kg (283 lb 11.7 oz)   LMP  (LMP Unknown)   SpO2 97%   BMI 53.61 kg/m²    Constitutional: Obese.  Well nourished, no acute distress,   HENT: Normocephalic, Atraumatic, Oropharynx moist, No oral exudates, Nose normal.   Eyes: PERRLA, EOMI, Conjunctiva normal, No discharge.   Musculoskeletal: Neck normal range of motion, No tenderness, Supple,  Cardiovascular: Regular rhythm she has +2 pedal edema.  Negative unilateral leg swelling  Thorax & Lungs: Diffuse wheezes in the right lower lobe.  No rhonchi no rales  Abdomen: Bowel sounds normal, Soft, No tenderness, No masses, No pulsatile masses.   Skin: Warm, Dry, No erythema, No rash.   : No CVA tenderness.   Neurologic: Alert & oriented , moves all extremities equally  Psychiatric:  Calm, not " anxious      RADIOLOGY/PROCEDURES  DX-CHEST-PORTABLE (1 VIEW)   Final Result         Airspace opacity in the right midlung, likely pneumonia.        Results for orders placed or performed during the hospital encounter of 10/06/21   CBC w/ Differential   Result Value Ref Range    WBC 6.1 4.8 - 10.8 K/uL    RBC 4.41 4.20 - 5.40 M/uL    Hemoglobin 11.7 (L) 12.0 - 16.0 g/dL    Hematocrit 39.1 37.0 - 47.0 %    MCV 88.7 81.4 - 97.8 fL    MCH 26.5 (L) 27.0 - 33.0 pg    MCHC 29.9 (L) 33.6 - 35.0 g/dL    RDW 52.6 (H) 35.9 - 50.0 fL    Platelet Count 222 164 - 446 K/uL    MPV 10.5 9.0 - 12.9 fL    Neutrophils-Polys 80.80 (H) 44.00 - 72.00 %    Lymphocytes 8.50 (L) 22.00 - 41.00 %    Monocytes 9.10 0.00 - 13.40 %    Eosinophils 1.10 0.00 - 6.90 %    Basophils 0.20 0.00 - 1.80 %    Immature Granulocytes 0.30 0.00 - 0.90 %    Nucleated RBC 0.00 /100 WBC    Neutrophils (Absolute) 4.95 2.00 - 7.15 K/uL    Lymphs (Absolute) 0.52 (L) 1.00 - 4.80 K/uL    Monos (Absolute) 0.56 0.00 - 0.85 K/uL    Eos (Absolute) 0.07 0.00 - 0.51 K/uL    Baso (Absolute) 0.01 0.00 - 0.12 K/uL    Immature Granulocytes (abs) 0.02 0.00 - 0.11 K/uL    NRBC (Absolute) 0.00 K/uL    Anisocytosis 1+     Microcytosis 1+    Complete Metabolic Panel (CMP)   Result Value Ref Range    Sodium 143 135 - 145 mmol/L    Potassium 4.4 3.6 - 5.5 mmol/L    Chloride 105 96 - 112 mmol/L    Co2 27 20 - 33 mmol/L    Anion Gap 11.0 7.0 - 16.0    Glucose 90 65 - 99 mg/dL    Bun 9 8 - 22 mg/dL    Creatinine 0.77 0.50 - 1.40 mg/dL    Calcium 9.0 8.5 - 10.5 mg/dL    AST(SGOT) 16 12 - 45 U/L    ALT(SGPT) 13 2 - 50 U/L    Alkaline Phosphatase 148 (H) 30 - 99 U/L    Total Bilirubin 0.7 0.1 - 1.5 mg/dL    Albumin 4.1 3.2 - 4.9 g/dL    Total Protein 7.1 6.0 - 8.2 g/dL    Globulin 3.0 1.9 - 3.5 g/dL    A-G Ratio 1.4 g/dL   Troponin STAT   Result Value Ref Range    Troponin T 35 (H) 6 - 19 ng/L   PT/INR   Result Value Ref Range    PT 20.0 (H) 12.0 - 14.6 sec    INR 1.76 (H) 0.87 - 1.13    COV-2, FLU A/B, AND RSV BY PCR (2-4 HOURS CEPHEID): Collect NP swab in VTM    Specimen: Respirate   Result Value Ref Range    Influenza virus A RNA Negative Negative    Influenza virus B, PCR Negative Negative    RSV, PCR Negative Negative    SARS-CoV-2 by PCR DETECTED (AA)     SARS-CoV-2 Source NP Swab    PERIPHERAL SMEAR REVIEW   Result Value Ref Range    Peripheral Smear Review see below    PLATELET ESTIMATE   Result Value Ref Range    Plt Estimation Normal    MORPHOLOGY   Result Value Ref Range    RBC Morphology Present     Ovalocytes 1+     Spherocytes 1+    DIFFERENTIAL COMMENT   Result Value Ref Range    Comments-Diff see below    ESTIMATED GFR   Result Value Ref Range    GFR If African American >60 >60 mL/min/1.73 m 2    GFR If Non African American >60 >60 mL/min/1.73 m 2        COURSE & MEDICAL DECISION MAKING  Pertinent Labs & Imaging studies reviewed. (See chart for details)  46-year-old female sick since Friday with cough worsening shortness of breath she is on baseline 2 L she has not needed increased 2 L is here because she could not get out of bed.  At this point I suspect that her inability get a bed is most likely secondary to her breathing.  At this point differential includes Covid pneumonia COPD exacerbation bronchitis less likely PE.    At this point, patient looks stable, will go ahead and call the social work team as well in case the patient has no acute issues    Spoken to the .  Patient at this point makes no criteria for acute admit.  She has a pulse ox that is normal she is on oxygen she is Covid positive.  In addition the note pneumonia this point I have asked the patient to obviously recheck with her primary care doctor.  Get seen tomorrow.  She does have daily help in which the physical therapist is watching her daily.  The patient otherwise has no other acute conditions at this time.  I have asked her to return tomorrow by ambulance or the next day if she starts to  continue to feel worse in any way more shortness of breath.    This is a [age]-year-old female has COVID, probable bacterial pneumonia history of COPD and A. fib will put on a short course of steroids.  Prednisone 60 mg daily for 4 days.  Antibiotics for 1 week and have asked urology to return if you she feels worse.  Discharged home in stable condition      FINAL IMPRESSION  1.  Pneumonia  2.  COVID-19  3.      Electronically signed by: Yeison Justice M.D., 10/6/2021 11:35 AM

## 2021-10-07 ENCOUNTER — HOME CARE VISIT (OUTPATIENT)
Dept: HOME HEALTH SERVICES | Facility: HOME HEALTHCARE | Age: 46
End: 2021-10-07
Payer: MEDICAID

## 2021-10-07 PROBLEM — U07.1 PNEUMONIA DUE TO COVID-19 VIRUS: Status: ACTIVE | Noted: 2021-10-07

## 2021-10-07 PROBLEM — J12.82 PNEUMONIA DUE TO COVID-19 VIRUS: Status: ACTIVE | Noted: 2021-10-07

## 2021-10-07 LAB
ANION GAP SERPL CALC-SCNC: 10 MMOL/L (ref 7–16)
BASE EXCESS BLDA CALC-SCNC: 8 MMOL/L (ref -4–3)
BODY TEMPERATURE: ABNORMAL CENTIGRADE
BUN SERPL-MCNC: 12 MG/DL (ref 8–22)
CALCIUM SERPL-MCNC: 9.3 MG/DL (ref 8.5–10.5)
CHLORIDE SERPL-SCNC: 103 MMOL/L (ref 96–112)
CO2 SERPL-SCNC: 30 MMOL/L (ref 20–33)
CREAT SERPL-MCNC: 0.85 MG/DL (ref 0.5–1.4)
CRP SERPL HS-MCNC: 3.88 MG/DL (ref 0–0.75)
EST. AVERAGE GLUCOSE BLD GHB EST-MCNC: 134 MG/DL
GLUCOSE BLD-MCNC: 113 MG/DL (ref 65–99)
GLUCOSE BLD-MCNC: 152 MG/DL (ref 65–99)
GLUCOSE SERPL-MCNC: 142 MG/DL (ref 65–99)
HBA1C MFR BLD: 6.3 % (ref 4–5.6)
HCO3 BLDA-SCNC: 34 MMOL/L (ref 17–25)
INR PPP: 1.45 (ref 0.87–1.13)
PCO2 BLDA: 52.2 MMHG (ref 26–37)
PH BLDA: 7.43 [PH] (ref 7.4–7.5)
PO2 BLDA: 57.3 MMHG (ref 64–87)
POTASSIUM SERPL-SCNC: 4.6 MMOL/L (ref 3.6–5.5)
PROCALCITONIN SERPL-MCNC: <0.05 NG/ML
PROTHROMBIN TIME: 17.2 SEC (ref 12–14.6)
SAO2 % BLDA: 89.8 % (ref 93–99)
SODIUM SERPL-SCNC: 143 MMOL/L (ref 135–145)
TROPONIN T SERPL-MCNC: 25 NG/L (ref 6–19)

## 2021-10-07 PROCEDURE — 94640 AIRWAY INHALATION TREATMENT: CPT

## 2021-10-07 PROCEDURE — A9270 NON-COVERED ITEM OR SERVICE: HCPCS | Performed by: STUDENT IN AN ORGANIZED HEALTH CARE EDUCATION/TRAINING PROGRAM

## 2021-10-07 PROCEDURE — 82803 BLOOD GASES ANY COMBINATION: CPT

## 2021-10-07 PROCEDURE — 36415 COLL VENOUS BLD VENIPUNCTURE: CPT

## 2021-10-07 PROCEDURE — 700102 HCHG RX REV CODE 250 W/ 637 OVERRIDE(OP): Performed by: STUDENT IN AN ORGANIZED HEALTH CARE EDUCATION/TRAINING PROGRAM

## 2021-10-07 PROCEDURE — 84484 ASSAY OF TROPONIN QUANT: CPT

## 2021-10-07 PROCEDURE — 83036 HEMOGLOBIN GLYCOSYLATED A1C: CPT

## 2021-10-07 PROCEDURE — 86140 C-REACTIVE PROTEIN: CPT

## 2021-10-07 PROCEDURE — 700111 HCHG RX REV CODE 636 W/ 250 OVERRIDE (IP): Performed by: STUDENT IN AN ORGANIZED HEALTH CARE EDUCATION/TRAINING PROGRAM

## 2021-10-07 PROCEDURE — 84145 PROCALCITONIN (PCT): CPT

## 2021-10-07 PROCEDURE — 97166 OT EVAL MOD COMPLEX 45 MIN: CPT

## 2021-10-07 PROCEDURE — 87040 BLOOD CULTURE FOR BACTERIA: CPT | Mod: 91

## 2021-10-07 PROCEDURE — 80048 BASIC METABOLIC PNL TOTAL CA: CPT

## 2021-10-07 PROCEDURE — 700105 HCHG RX REV CODE 258: Performed by: STUDENT IN AN ORGANIZED HEALTH CARE EDUCATION/TRAINING PROGRAM

## 2021-10-07 PROCEDURE — 97162 PT EVAL MOD COMPLEX 30 MIN: CPT

## 2021-10-07 PROCEDURE — 85610 PROTHROMBIN TIME: CPT

## 2021-10-07 PROCEDURE — 96375 TX/PRO/DX INJ NEW DRUG ADDON: CPT

## 2021-10-07 PROCEDURE — 96367 TX/PROPH/DG ADDL SEQ IV INF: CPT

## 2021-10-07 PROCEDURE — 700102 HCHG RX REV CODE 250 W/ 637 OVERRIDE(OP): Performed by: INTERNAL MEDICINE

## 2021-10-07 PROCEDURE — 770020 HCHG ROOM/CARE - TELE (206)

## 2021-10-07 PROCEDURE — A9270 NON-COVERED ITEM OR SERVICE: HCPCS | Performed by: INTERNAL MEDICINE

## 2021-10-07 PROCEDURE — 99233 SBSQ HOSP IP/OBS HIGH 50: CPT | Performed by: HOSPITALIST

## 2021-10-07 PROCEDURE — 99406 BEHAV CHNG SMOKING 3-10 MIN: CPT

## 2021-10-07 PROCEDURE — 82962 GLUCOSE BLOOD TEST: CPT

## 2021-10-07 RX ORDER — FUROSEMIDE 10 MG/ML
40 INJECTION INTRAMUSCULAR; INTRAVENOUS ONCE
Status: COMPLETED | OUTPATIENT
Start: 2021-10-07 | End: 2021-10-07

## 2021-10-07 RX ORDER — DEXAMETHASONE 6 MG/1
6 TABLET ORAL DAILY
Status: DISCONTINUED | OUTPATIENT
Start: 2021-10-08 | End: 2021-10-13 | Stop reason: HOSPADM

## 2021-10-07 RX ORDER — DEXTROSE MONOHYDRATE 25 G/50ML
50 INJECTION, SOLUTION INTRAVENOUS
Status: DISCONTINUED | OUTPATIENT
Start: 2021-10-07 | End: 2021-10-13 | Stop reason: HOSPADM

## 2021-10-07 RX ORDER — DEXAMETHASONE 6 MG/1
6 TABLET ORAL DAILY
Status: DISCONTINUED | OUTPATIENT
Start: 2021-10-07 | End: 2021-10-07

## 2021-10-07 RX ORDER — SIMETHICONE 80 MG
80 TABLET,CHEWABLE ORAL 3 TIMES DAILY PRN
Status: DISCONTINUED | OUTPATIENT
Start: 2021-10-07 | End: 2021-10-08

## 2021-10-07 RX ORDER — BUDESONIDE AND FORMOTEROL FUMARATE DIHYDRATE 160; 4.5 UG/1; UG/1
2 AEROSOL RESPIRATORY (INHALATION)
Status: DISCONTINUED | OUTPATIENT
Start: 2021-10-07 | End: 2021-10-10

## 2021-10-07 RX ADMIN — INSULIN HUMAN 2 UNITS: 100 INJECTION, SOLUTION PARENTERAL at 17:45

## 2021-10-07 RX ADMIN — MONTELUKAST 10 MG: 10 TABLET, FILM COATED ORAL at 20:10

## 2021-10-07 RX ADMIN — NICOTINE 14 MG: 14 PATCH TRANSDERMAL at 05:24

## 2021-10-07 RX ADMIN — SIMETHICONE 80 MG: 80 TABLET, CHEWABLE ORAL at 20:45

## 2021-10-07 RX ADMIN — DEXAMETHASONE 6 MG: 6 TABLET ORAL at 08:06

## 2021-10-07 RX ADMIN — NICOTINE 14 MG: 14 PATCH TRANSDERMAL at 14:55

## 2021-10-07 RX ADMIN — TIOTROPIUM BROMIDE INHALATION SPRAY 5 MCG: 3.12 SPRAY, METERED RESPIRATORY (INHALATION) at 07:06

## 2021-10-07 RX ADMIN — WARFARIN SODIUM 6 MG: 6 TABLET ORAL at 17:33

## 2021-10-07 RX ADMIN — CEFTRIAXONE SODIUM 2 G: 2 INJECTION, POWDER, FOR SOLUTION INTRAMUSCULAR; INTRAVENOUS at 05:15

## 2021-10-07 RX ADMIN — ARIPIPRAZOLE 30 MG: 15 TABLET ORAL at 05:24

## 2021-10-07 RX ADMIN — ATORVASTATIN CALCIUM 20 MG: 20 TABLET, FILM COATED ORAL at 17:33

## 2021-10-07 RX ADMIN — AZITHROMYCIN MONOHYDRATE 500 MG: 250 TABLET ORAL at 20:10

## 2021-10-07 RX ADMIN — DOCUSATE SODIUM 50 MG AND SENNOSIDES 8.6 MG 2 TABLET: 8.6; 5 TABLET, FILM COATED ORAL at 17:33

## 2021-10-07 RX ADMIN — FLUTICASONE PROPIONATE 100 MCG: 50 SPRAY, METERED NASAL at 17:33

## 2021-10-07 RX ADMIN — TRAZODONE HYDROCHLORIDE 50 MG: 100 TABLET ORAL at 20:10

## 2021-10-07 RX ADMIN — FUROSEMIDE 40 MG: 10 INJECTION, SOLUTION INTRAMUSCULAR; INTRAVENOUS at 01:57

## 2021-10-07 RX ADMIN — FLUTICASONE PROPIONATE 100 MCG: 50 SPRAY, METERED NASAL at 05:24

## 2021-10-07 ASSESSMENT — COGNITIVE AND FUNCTIONAL STATUS - GENERAL
TURNING FROM BACK TO SIDE WHILE IN FLAT BAD: A LOT
MOVING TO AND FROM BED TO CHAIR: A LOT
MOVING FROM LYING ON BACK TO SITTING ON SIDE OF FLAT BED: A LOT
CLIMB 3 TO 5 STEPS WITH RAILING: A LOT
WALKING IN HOSPITAL ROOM: A LITTLE
DRESSING REGULAR LOWER BODY CLOTHING: A LOT
MOBILITY SCORE: 14
SUGGESTED CMS G CODE MODIFIER MOBILITY: CL
PERSONAL GROOMING: A LITTLE
TOILETING: A LOT
SUGGESTED CMS G CODE MODIFIER DAILY ACTIVITY: CK
DAILY ACTIVITIY SCORE: 16
STANDING UP FROM CHAIR USING ARMS: A LITTLE
HELP NEEDED FOR BATHING: A LOT
DRESSING REGULAR UPPER BODY CLOTHING: A LITTLE

## 2021-10-07 ASSESSMENT — ENCOUNTER SYMPTOMS
COUGH: 1
NECK PAIN: 0
SPUTUM PRODUCTION: 1
EYE DISCHARGE: 0
HEADACHES: 1
BLOOD IN STOOL: 0
PHOTOPHOBIA: 0
BACK PAIN: 0
EYE PAIN: 0
CHILLS: 1
DIARRHEA: 0
DIZZINESS: 1
CONSTIPATION: 0
PALPITATIONS: 1
FEVER: 1
WHEEZING: 1
SHORTNESS OF BREATH: 1

## 2021-10-07 ASSESSMENT — COPD QUESTIONNAIRES
HAVE YOU SMOKED AT LEAST 100 CIGARETTES IN YOUR ENTIRE LIFE: YES
COPD SCREENING SCORE: 4
DO YOU EVER COUGH UP ANY MUCUS OR PHLEGM?: NO/ONLY WITH OCCASIONAL COLDS OR INFECTIONS
DURING THE PAST 4 WEEKS HOW MUCH DID YOU FEEL SHORT OF BREATH: SOME OF THE TIME

## 2021-10-07 ASSESSMENT — LIFESTYLE VARIABLES: EVER_SMOKED: YES

## 2021-10-07 ASSESSMENT — PAIN DESCRIPTION - PAIN TYPE: TYPE: ACUTE PAIN

## 2021-10-07 ASSESSMENT — ACTIVITIES OF DAILY LIVING (ADL): TOILETING: INDEPENDENT

## 2021-10-07 ASSESSMENT — GAIT ASSESSMENTS: GAIT LEVEL OF ASSIST: UNABLE TO PARTICIPATE

## 2021-10-07 NOTE — DISCHARGE SUMMARY
Discharge Summary    Date of Admission: 10/6/2021  Date of Discharge: 10/13/2021  Discharging Attending: Sariah Stout M.D.   Discharging Senior Resident: Dr. Gan  Discharging Intern: Dr. Cyr    CHIEF COMPLAINT ON ADMISSION  Chief Complaint   Patient presents with   • Difficulty Breathing     worsening since friday, on home 02 2 liters, hasn't needed to increase o2 demand.  negative covid tests, has had first covid shot.    • Cough     yellow productive cough       Reason for Admission  New cough and sputum with associated chest x-ray findings suggestive of pneumonia    Admission Date  10/6/2021    CODE STATUS  Full Code    HPI & HOSPITAL COURSE    This is a 46 y.o. female here with past medical history significant for mixed restrictive and obstructive lung disease with bronchodilator response ( FEV1/FVC 86%, FEV1 48% and post bronchodilator response 58% predicted, FVC 46%, TLC 78% predicted, DLCO 85%), chronic oxygen use 2L at home, KATHIA/OSH (unclear adherence), HFpEF with severe pulmonary HTN, paroxysmal afib ( on warfarin), morbid obesity, bipolar disorder, schizophrenia, tobacco use who was admitted on 10/7/2021 with productive cough found to have bacterial pneumonia and was treated with IV ceftriaxone and azithromycin.  Patient was placed on Covid precautions and started on dexamethasone due to positive SARS-CoV-2 PCR detected on 10/6/2021 patient is Covid vaccinated with Pfizer on April and May 2021.     # Lobar Pneumonia  # Covid vaccinated  # COVID-19 positive per PCR on 10/6/2021  # Acute on Chronic hypoxic respiratory Failure  - Patient was treated with 5 days of ceftriaxone and 3 days of azithromycin with improvement of her symptoms.   - She was started on a course of dexamethasone 6mg for her positive COVID-19 PCR  Plan  - dexamethasone 6mg EOT 10/16/2021  - Attempted to set up COVID-19 home oxygen monitoring however patient does not have a smart phone to set up Bluetooth for remote monitoring.  I  wrote an order for DME O2 pulse ox.  Discussed with patient he should come into the hospital if she feels worsening dyspnea.    # COPD, mixed restrictive and obstructive  # Chronic oxygen use  - Patient was receiving DuoNebs albuterol and tiotropium while inpatient  Plan  - at time of discharge patient required 3 L via nasal cannula at rest and 6 L while ambulating.  Patient was discharged with home oxygen.  Patient was set up with DME provider for oxygen.  - Continue home albuterol and tiotropium   discharged with Symbicort    #Atrial fibrillation on warfarin  [id number]  Heart rate while inpatient range average from 50-80  Plan  Continue home warfarin  Refer to INR/anticoagulation clinic    # Lower extremity Edema  # Pulmonary hypertension group II/III, RVSP 65mmHg (prioir echo)  # KATHIA (remote sleep study with AHI of 130)  # Chronic hypercapnic and hypoxic respiratory failure secondary to KATHIA and obesity hypoventilation syndrome  - Patient follows with known pulmonology outpatient last visit was 8/16/2021 she was referred to sleep study with goal of obtaining PFT in the future and has follow-up with pulmonology on 12/16/2021  - Diuresis lasix was discontinued at discharge as patient had not required diuresis during hospitalization.  No overt sign of fluid overload during hospitalization.    # Prediabetes  A1C 6.3%      Therefore, she is discharged in fair and stable condition to home with close outpatient follow-up.    The patient met 2-midnight criteria for an inpatient stay at the time of discharge.    PHYSICAL EXAM ON DISCHARGE  Temp:  [36.3 °C (97.4 °F)-37.1 °C (98.8 °F)] 36.6 °C (97.8 °F)  Pulse:  [74-89] 74  Resp:  [17-23] 22  BP: (107-138)/() 112/59  SpO2:  [89 %-97 %] 89 %    Physical Exam  Constitutional:       General: She is not in acute distress.     Appearance: She is obese. She is not ill-appearing, toxic-appearing or diaphoretic.   HENT:      Head: Normocephalic and atraumatic.       Mouth/Throat:      Mouth: Mucous membranes are moist.   Eyes:      General: No scleral icterus.     Conjunctiva/sclera: Conjunctivae normal.   Cardiovascular:      Rate and Rhythm: Normal rate and regular rhythm.      Pulses: Normal pulses.   Pulmonary:      Effort: No respiratory distress.      Breath sounds: Normal breath sounds. No wheezing, rhonchi or rales.   Abdominal:      General: There is no distension.      Palpations: Abdomen is soft.      Tenderness: There is no abdominal tenderness. There is no guarding.   Musculoskeletal:      Cervical back: Normal range of motion and neck supple.      Right lower leg: No edema.      Left lower leg: No edema.   Skin:     General: Skin is warm and dry.   Neurological:      General: No focal deficit present.      Mental Status: She is alert and oriented to person, place, and time.         Discharge Date  10/13/2021    FOLLOW UP ITEMS POST DISCHARGE  -Patient will need to follow-up with pulmonology and  in sleep clinic as she has severe obstructive sleep apnea and obstructive and restrictive finding on PFTs  -Follow-up with PCP may benefit from reassessment of oxygen requirement  -Home Lasix was discontinued as patient had not required Lasix during hospitalization.      DISCHARGE DIAGNOSES  Principal Problem:    Lobar pneumonia (HCC) (Chronic) POA: Yes  Active Problems:    KATHIA (obstructive sleep apnea) POA: Yes    Paroxysmal atrial fibrillation (HCC) POA: Unknown    Lower extremity edema POA: Yes    Obesity hypoventilation syndrome (HCC) POA: Yes    Chronic hypoxic respiratory failure (HCC) POA: Yes    Pneumonia due to COVID-19 virus POA: Unknown  Resolved Problems:    * No resolved hospital problems. *      FOLLOW UP  Future Appointments   Date Time Provider Department Center   10/7/2021  2:30 PM Sony Fitzpatrick R.N. [facility] None   10/13/2021  3:40 PM Oz Borrego M.D. [facility] None   12/16/2021  2:40 PM Jessica Whitfield P.A.-C. [facility] None     No follow-up provider  specified.    MEDICATIONS ON DISCHARGE     Medication List      ASK your doctor about these medications      Instructions   Abilify 30 MG tablet  Generic drug: aripiprazole   Take 30 mg by mouth every morning.  Dose: 30 mg     acetaminophen 500 MG Tabs  Commonly known as: TYLENOL   Take 500 mg by mouth every 6 hours as needed (pain).  Dose: 500 mg     albuterol 108 (90 Base) MCG/ACT Aers inhalation aerosol   Inhale 2 Puffs every 6 hours as needed for Shortness of Breath.  Dose: 2 Puff     atorvastatin 20 MG Tabs  Commonly known as: LIPITOR  Ask about: Which instructions should I use?   Take 20 mg by mouth every day.  Dose: 20 mg     famotidine 40 MG Tabs  Commonly known as: PEPCID   Take 40 mg by mouth every day. Indications: Heartburn  Dose: 40 mg     fluticasone 50 MCG/ACT nasal spray  Commonly known as: FLONASE   Administer 1-2 Sprays into affected nostril(S) 2 times a day.  Dose: 1-2 Spray     furosemide 20 MG Tabs  Commonly known as: LASIX   Take 20 mg by mouth 2 times a day.  Dose: 20 mg     Home Care Oxygen   Inhale 2 L/min continuous. Oxygen is at 2L/NC continuously  Oxygen dose range: 2 L/min  Respiratory route via: Nasal Cannula   Oxygen supplier:[employer]  Dose: 2 L/min     ketoconazole 2 % shampoo  Commonly known as: NIZORAL   Apply 1 Application topically every day.  Dose: 1 Application     montelukast 10 MG Tabs  Commonly known as: SINGULAIR   Take 1 Tab by mouth every bedtime.  Dose: 10 mg     nitroglycerin 0.4 MG Subl  Commonly known as: NITROSTAT   Place 1 tablet under the tongue as needed for Chest Pain.  Dose: 0.4 mg     NON SPECIFIED   Apply 1 Application topically 1 time a day as needed (Rash under breasts). Anti-fungal powder  Dose: 1 Application     ondansetron 4 MG Tbdp  Commonly known as: Zofran ODT   Take 1 Tablet by mouth every 6 hours as needed for Nausea.  Dose: 4 mg     potassium chloride SA 20 MEQ Tbcr  Commonly known as: Kdur   TAKE 1 TABLET BY MOUTH EVERY DAY  Dose: 20 mEq      Spiriva HandiHaler 18 MCG Caps  Generic drug: tiotropium   Inhale 1 Cap by mouth every day.  Dose: 18 mcg     traZODone 50 MG Tabs  Commonly known as: DESYREL   Take  mg by mouth every day.  Dose:  mg     warfarin 6 MG Tabs  Commonly known as: COUMADIN   Doctor's comments: This rx was submitted by a pharmacist working under a collaborative practice agreement.  Take one-half tablet (3mg) on Mondays, and 1 tablet (6mg) all other days or as directed by the [facility]            Allergies  Allergies   Allergen Reactions   • Amoxicillin Rash and Itching     Tolerates cephalosporins, pt reports that she gets a rash all over her body and gets itchy   • Penicillin G Rash and Itching     pt reports that she gets a rash all over her body and gets itchy       DIET  Orders Placed This Encounter   Procedures   • Diet Order Diet: Cardiac     Standing Status:   Standing     Number of Occurrences:   1     Order Specific Question:   Diet:     Answer:   Cardiac [6]       ACTIVITY  As tolerated.  Weight bearing as tolerated    CONSULTATIONS  NA    PROCEDURES  NA

## 2021-10-07 NOTE — ASSESSMENT & PLAN NOTE
1+ pitting edema of bilateral lower extremities  Will diurese with Lasix 40  Will order BNP in 6 hours  We will need to measure ins and outs accurately for next 6 hours to evaluate for adequate diuresis

## 2021-10-07 NOTE — ASSESSMENT & PLAN NOTE
Patient reports that she is chronically on 2 L oxygen.  She has been saturating 98% on her baseline, in the ED.  No desaturations.  No acute hypoxic respiratory failure.

## 2021-10-07 NOTE — H&P
History & Physical Note    Date of Admission: 10/6/2021  Admission Status: Observation-Outpatient  [facility] Team: [facility] Blue Team  Attending: Yosef Frankel M.D.   Senior Resident: Dr. Babin  Intern: Dr. Michel  Contact Number: [phone]    Chief Complaint: SOB    History of Present Illness (HPI):  Patient is a 46 year old female with past medical history of COPD on O2 (2L baseline, PFTs 9/2021 with mixed restrictive and obstructive disease, and ratio of 86%), bipolar 2 disorder, schizophrenia, obesity hypoventilation syndrome with BMI 53, KATHIA (but not on CPAP because it's broken), paroxysmal A fib (on coumadin chronically), COVID vaccine Pfizer in April and May 2021, recent stroke with residual numbness and tingling of left forehead 9/2021.      Patient reports that 6 days ago, felt headache and dizziness over the weekend. [facility] assessed her over the weekend, and steady since onset. Patient was feeling off today, so wanted to come in and know if COVID vs flu shot. Patient reports that headache is waxing and waning since onset 6 days ago. Tylenol worked for headache. Headache is frontal headache, not a headband around head, symmetrical, 1/10.      Patient also reports cough with yellow sputum. She reports subjective fevers at home. Denies any trouble swallowing/ choking.      Patient reports 30 x (2/7) pack-year history of smoking.  Denies alcohol use.  Denies any other drug use.  Lives by herself.  Has many sick contacts in her apartment building.  Patient reports that at baseline, she is able to cook and clean herself, but needs help to leave house. Reports being able to walk 1/2 of a grocery store before stopping due to SOB.     Review of Systems:   ROS   Constitutional: Positive for chills, fever (Subjective fever) and malaise/fatigue.   HENT: Negative for ear pain, hearing loss and tinnitus.    Eyes: Negative for blurred vision, double vision and photophobia.   Respiratory: Positive for cough and sputum  production (yellow). Negative for shortness of breath.    Cardiovascular: Negative for chest pain and palpitations.   Gastrointestinal: Negative for abdominal pain, nausea and vomiting.   Genitourinary: Negative for dysuria, frequency and urgency.   Musculoskeletal: Negative for myalgias and neck pain.   Neurological: Positive for headaches. Negative for dizziness and tremors.   Psychiatric/Behavioral: Negative for depression and suicidal ideas.   All other systems reviewed and are negative.    Past Medical History:   Past Medical History was reviewed with patient.   has a past medical history of Asthma, Bipolar 2 disorder (HCC), Chickenpox, Chronic obstructive pulmonary disease (HCC), Hypertension, On home oxygen therapy, Other psychological stress, Schizophrenic disorder (HCC), and Yeast infection of the skin (4/1/2021). She also has no past medical history of CAD (coronary artery disease), Cancer (HCC), Congestive heart failure (HCC), Diabetes, Liver disease, Renal disorder, Seizure disorder (HCC), or Stroke ([facility]).    Past Surgical History: Past Surgical History was reviewed with patient.   has a past surgical history that includes hysterectomy laparoscopy; colectomy; cholecystectomy; and knee arthroscopy (Left).    Medications: Medications have been reviewed with patient.  Prior to Admission Medications   Prescriptions Last Dose Informant Patient Reported? Taking?   Home Care Oxygen Continuous at Continuous Patient Yes No   Sig: Inhale 2 L/min continuous. Oxygen is at 2L/NC continuously  Oxygen dose range: 2 L/min  Respiratory route via: Nasal Cannula   Oxygen supplier:[employer]       NON SPECIFIED >1 week at [facility] Patient Yes Yes   Sig: Apply 1 Application topically 1 time a day as needed (Rash under breasts). Anti-fungal powder   acetaminophen (TYLENOL) 500 MG Tab 10/5/2021 at 1700 Patient Yes No   Sig: Take 500 mg by mouth every 6 hours as needed (pain).   albuterol 108 (90 Base) MCG/ACT Aero Soln inhalation  aerosol 10/6/2021 at 0730 Patient No No   Sig: Inhale 2 Puffs every 6 hours as needed for Shortness of Breath.   aripiprazole (ABILIFY) 30 MG tablet 10/6/2021 at 0700 Patient Yes No   Sig: Take 30 mg by mouth every morning.   atorvastatin (LIPITOR) 20 MG Tab unk at unk Patient Yes Yes   Sig: Take 20 mg by mouth every day.   famotidine (PEPCID) 40 MG Tab >1 week at unk Patient Yes No   Sig: Take 40 mg by mouth every day. Indications: Heartburn   fluticasone (FLONASE) 50 MCG/ACT nasal spray 10/6/2021 at 0700 Patient Yes No   Sig: Administer 1-2 Sprays into affected nostril(S) 2 times a day.   furosemide (LASIX) 20 MG Tab 10/5/2021 at 1600 Patient Yes No   Sig: Take 20 mg by mouth 2 times a day.   ketoconazole (NIZORAL) 2 % shampoo 10/4/2021 at unk Patient Yes Yes   Sig: Apply 1 Application topically every day.   montelukast (SINGULAIR) 10 MG Tab 10/5/2021 at 2100 Patient No No   Sig: Take 1 Tab by mouth every bedtime.   nitroglycerin (NITROSTAT) 0.4 MG SL Tab >3 weeks at k Patient No No   Sig: Place 1 tablet under the tongue as needed for Chest Pain.   ondansetron (ZOFRAN ODT) 4 MG TABLET DISPERSIBLE >1 week at unk Patient No No   Sig: Take 1 Tablet by mouth every 6 hours as needed for Nausea.   potassium chloride SA (KDUR) 20 MEQ Tab CR 10/6/2021 at 0700 Patient No No   Sig: TAKE 1 TABLET BY MOUTH EVERY DAY   tiotropium (SPIRIVA HANDIHALER) 18 MCG Cap 10/4/2021 at unk Patient No No   Sig: Inhale 1 Cap by mouth every day.   traZODone (DESYREL) 50 MG Tab 10/5/2021 at 2100 Patient Yes No   Sig: Take  mg by mouth every day.   warfarin (COUMADIN) 6 MG Tab 10/5/2021 at 1600 Patient No No   Sig: Take one-half tablet (3mg) on Mondays, and 1 tablet (6mg) all other days or as directed by the [facility]   Patient taking differently: Take 6 mg by mouth every day. or as directed by the [facility]      Facility-Administered Medications: None        Allergies: Allergies have been reviewed with  patient.  Allergies   Allergen Reactions   • Amoxicillin Rash and Itching     Tolerates cephalosporins, pt reports that she gets a rash all over her body and gets itchy   • Penicillin G Rash and Itching     pt reports that she gets a rash all over her body and gets itchy       Family History:   family history includes Cancer in her sister; No Known Problems in her mother.     Social History:   Tobacco: 30 x 2/7  Alcohol: N/A  Recreational drugs (illegal and prescription): N/A  Employment: N/A  Activity Level: See HPI  Living situation:  By self in apt  Recent travel:  no  Primary Care Provider: reviewed Sheridan Dinh A.P.N.  Other (stressors, spirituality, exposures):  N/A  Physical Exam:   Vitals:  Temp:  [37.1 °C (98.8 °F)] 37.1 °C (98.8 °F)  Pulse:  [74-89] 79  Resp:  [17-23] 23  BP: (107-138)/() 117/56  SpO2:  [93 %-97 %] 97 %    Physical Exam  Physical Exam  Constitutional:       Appearance: She is obese.   HENT:      Head: Normocephalic and atraumatic.      Mouth/Throat:      Mouth: Mucous membranes are moist.   Cardiovascular:      Rate and Rhythm: Normal rate and regular rhythm.      Pulses: Normal pulses.      Heart sounds: No murmur heard.     Pulmonary:      Effort: Pulmonary effort is normal. No respiratory distress.      Breath sounds: No wheezing or rales.   Abdominal:      General: Abdomen is flat. Bowel sounds are normal. There is no distension.      Tenderness: There is no abdominal tenderness.   Musculoskeletal:         General: No swelling or deformity. Normal range of motion.      Cervical back: Normal range of motion. No rigidity.      Right lower leg: Edema (1+ pitting edema) present.      Left lower leg: Edema (1+ pitting edema) present.   Lymphadenopathy:      Cervical: No cervical adenopathy.   Skin:     General: Skin is warm.      Capillary Refill: Capillary refill takes less than 2 seconds.   Neurological:      General: No focal deficit present.      Mental Status: She is alert and  oriented to person, place, and time.      Sensory: Sensory deficit (Left-sided forehead numbness and tingling.  Normal motor function) present.   Psychiatric:         Mood and Affect: Mood normal.         Behavior: Behavior normal.         Thought Content: Thought content normal.         Judgment: Judgment normal.     Labs:  CBC normal, with WBC 6.1. HGB 11.7  CMP normal. Somewhat elevated alk phos  Troponin 35  INR 1.76  COVID positive     Micro: N/A     EKG:   Sinus rhythm   Atrial premature complex   Probable left atrial enlargement   Low voltage, precordial leads   ST elev, probable normal early repol pattern      Imaging:   CXR: Airspace opacity in the right midlung, likely pneumonia    Previous Data Review: reviewed    Problem Representation:   Lorene Haro is a 45 yo female with cough and generalized weakness. Found to have lobar pneumonia, viral versus bacterial.  She has past medical history of obesity hypoventilation syndrome, paroxysmal A. fib, KATHIA.  Found to be COVID positive. We will continue to treat with antibiotics, diuresis.  Will follow for electrolyte abnormalities secondary to diuresis, so will follow on telemetry.  No evidence of acute hypoxic respiratory failure, as she is at her baseline O2 requirements    * Lobar pneumonia (HCC)- (present on admission)  Assessment & Plan  Viral vs bacterial vs bronchitis  Patient reports productive cough with yellow sputum, subjective fevers, generalized weakness.  Received 1x dose of ceftriaxone and 1x dose of doxycycline in ER  We will start empiric treatment with ceftriaxone and azithromycin  Patient tested positive for COVID  She does not meet criteria for treatment for dexamethasone or remdesivir    Lower extremity edema- (present on admission)  Assessment & Plan  1+ pitting edema of bilateral lower extremities  Will diurese with Lasix 40  Will order BNP in 6 hours  We will need to measure ins and outs accurately for next 6 hours to evaluate for  adequate diuresis    Chronic hypoxic respiratory failure (HCC)- (present on admission)  Assessment & Plan  Patient reports that she is chronically on 2 L oxygen.  She has been saturating 98% on her baseline, in the ED.  No desaturations.  No acute hypoxic respiratory failure.    Obesity hypoventilation syndrome (HCC)- (present on admission)  Assessment & Plan  PFTs 9/2021 with ratio 86.    Pulmonologist assessment reports mixed restrictive and obstructive disease    KATHIA (obstructive sleep apnea)- (present on admission)  Assessment & Plan  Patient reports being diagnosed with KATHAI, but no recent CPAP use as it is broken.  Will order RT protocol to see if she will be able to use CPAP while admitted

## 2021-10-07 NOTE — PROGRESS NOTES
Inpatient Anticoagulation Service Note    Date: 10/7/2021  Reason for Anticoagulation: Atrial Fibrillation  Hemoglobin Value: (Abnormal) 11.7  Hematocrit Value: 39.1  Lab Platelet Value: 222  Target INR: 2.0 to 3.0  INR from last 7 days     Date/Time INR Value    10/07/21 1041 (Abnormal) 1.45    10/06/21 1200 (Abnormal) 1.76        Dose from last 7 days     Date/Time Dose (mg)    10/07/21 1225 6    10/06/21 2053 10        Average Dose (mg): 6  Significant Interactions: Antibiotics, Statin, Corticosteroids, Other (Comments) (trazodone)  Bridge Therapy: No   Reversal Agent Administered: Not Applicable    A/P  Sub-therapeutic INR, trending down from yesterday.  Still have not seen full impact of 10 mg dose given yesterday.  Reviewed outpatient warfarin dosing per note 9/30 (9 mg on Thursday and 6 mg PO AOD).  Per med rec appears patient has been taking warfarin 6 mg PO daily.  Given acute illness, new DDI and bolus dose yesterday, will continue with warfarin 6 mg PO daily for now.  INR tomorrow.    Education Material Provided?: No  Pharmacist suggested discharge dosing: TBD pending INR trends, perhaps warfarin 9 mg PO every Thursday and 6 mg PO AOD.  Recommend a follow-up PT/INR within 48-72 hours of discharge.     Joy Liu, PharmD, BCPS, BCCCP

## 2021-10-07 NOTE — ASSESSMENT & PLAN NOTE
Viral vs bacterial vs bronchitis  Patient reports productive cough with yellow sputum, subjective fevers, generalized weakness.  Received 1x dose of ceftriaxone and 1x dose of doxycycline in ER  We will start empiric treatment with ceftriaxone and azithromycin  Patient tested positive for COVID  She does not meet criteria for treatment for dexamethasone or remdesivir

## 2021-10-07 NOTE — THERAPY
Physical Therapy   Initial Evaluation     Patient Name: Lorene Haro  Age:  46 y.o., Sex:  female  Medical Record #: [id number]  Today's Date: 10/7/2021     Precautions  Precautions: Fall Risk;Other (See Comments) (COVID +)    Assessment  Pt presents to PT with impaired functional endurance, aerobic capacity and general locomotion associated with deconditioning and medical co-morbidities. She was able to demonstrate bed mobility, sit<>stands and pre-gait activities with min A. She was primarily limited 2' to increased SOB/WOB and fatigue with limited activity. Will continue to visit with details/recs below.     Plan    Recommend Physical Therapy 4 times per week until therapy goals are met    DC Equipment Recommendations: Unable to determine at this time  Discharge Recommendations: Recommend post-acute placement for additional physical therapy services prior to discharge home        10/07/21 1218   Prior Living Situation   Prior Services None   Housing / Facility 1 Story Apartment / Condo  (multistory though pt has elevator access)   Steps Into Home 0   Steps In Home 0   Elevator Yes   Equipment Owned 4-Wheel Walker   Lives with - Patient's Self Care Capacity Attendant / Paid Care Giver  (see below)   Comments appears to have caregivers who assist with IADLs and some ADLs monday through thursday; pt essentially alone friday, sat, sunday per pt report   Prior Level of Functional Mobility   Bed Mobility Independent   Transfer Status Independent   Ambulation Independent   Distance Ambulation (Feet)   (mostly household; intermittent limited community)   Assistive Devices Used 4-Wheel Walker   Stairs Unable To Determine At This Time   Comments as above; appears was mostly I with ADls and assist for some IADLs from caregivers; reports showers with caregivers present though they were more Spv    Cognition    Cognition / Consciousness X   Level of Consciousness Alert   Comments cooperative and pleasant; quite fatigued and  somewhat groggy appearance/fog throughout   Passive ROM Lower Body   Passive ROM Lower Body WDL   Active ROM Lower Body    Active ROM Lower Body  X   Comments limited 2' to fatigue and body habitus with BLE hip flexion; see strength   Strength Lower Body   Lower Body Strength  X   Gross Strength Generalized Weakness, Equal Bilaterally   Comments grosssly fatigued with limited activity    Sensation Lower Body   Lower Extremity Sensation   Not Tested   Balance Assessment   Sitting Balance (Static) Fair +   Sitting Balance (Dynamic) Fair   Standing Balance (Static) Fair -   Standing Balance (Dynamic) Poor +   Weight Shift Sitting Fair   Weight Shift Standing Fair   Comments no sherin LOB during standing and sidestepping; however very limited endurance currently with standing/upright activity(see activity tolerance)   Gait Analysis   Gait Level Of Assist Unable to Participate   Weight Bearing Status no restrictions   Comments see balance; standing and sidestepping only 2' to current fatigue/inc SOB/WOB   Bed Mobility    Supine to Sit Minimal Assist  (mostly CGA/mod I )   Sit to Supine Minimal Assist   Comments efforftul with limited activity, though does mostly perform without physical assist other than HHA with pt using own strength to pull self   Functional Mobility   Sit to Stand Minimal Assist  (mostly to initiate)   Bed, Chair, Wheelchair Transfer Minimal Assist  (sidestepping)   Transfer Method   (sidestepping)   Mobility bed mobility, EOB, sit<>stands, sidestepping   How much difficulty does the patient currently have...   Turning over in bed (including adjusting bedclothes, sheets and blankets)? 2   Sitting down on and standing up from a chair with arms (e.g., wheelchair, bedside commode, etc.) 2   Moving from lying on back to sitting on the side of the bed? 2   How much help from another person does the patient currently need...   Moving to and from a bed to a chair (including a wheelchair)? 3   Need to walk in  a hospital room? 3   Climbing 3-5 steps with a railing? 2   6 clicks Mobility Score 14   Activity Tolerance   Sitting in Chair NT   Sitting Edge of Bed ~5-6 mins   Standing ~2 mins   Comments fatigues and increased SOB/WOB with limited activity; required increased supplemental 02 to maintain Sp02 >88%; generally fatigues with limited activity currently    Edema / Skin Assessment   Edema / Skin  Not Assessed   Patient / Family Goals    Patient / Family Goal #1 to feel better   Short Term Goals    Short Term Goal # 1 pt will be able to perform supine<>sit with HOB flat/no rails with Spv in 6tx to promote fnx progression towards I    Short Term Goal # 2 pt will be able to perform sit<>stand/transfers with 4WW with Spv in 6tx to promote fnx progression towards I    Short Term Goal # 3 pt will be able to ambulate 75ft with 4WW with Spv in 6tx to promote fnx progression towards I

## 2021-10-07 NOTE — RESPIRATORY CARE
"COPD EDUCATION by COPD CLINICAL EDUCATOR  10/7/2021 at 3:01 PM by Vijaya Grady RRT     Patient interviewed by COPD education team. Patient does not have COPD per chart, is followed by [facility].      Smoking Cessation Intervention and education completed, 5 minutes spent on smoking cessation education with patient.    Provided smoking cessation packet with \"Tips to Quit\" and brochure for \"Free Smoking Cessation Classes\".     COPD Screen  COPD Risk Screening  Do you have a history of COPD?: No  COPD Population Screener  During the past 4 weeks, how much did you feel short of breath?: Some of the time  Do you ever cough up any mucus or phlegm?: No/only with occasional colds or infections  In the past 12 months, you do less than you used to because of your breathing problems: Agree  Have you smoked at least 100 cigarettes in your entire life?: Yes  How old are you?: 35-49  COPD Screening Score: 4  COPD Coordinator Not Recommended: Yes    COPD Assessment  COPD Clinical Specialists ONLY  COPD Education Initiated: No--Quick Screen (Per chart Pt followed by [facility], doesn't have COPD, seen for smoking cessation.)  Pulmonary Follow Up Appointment: 12/16/21  Appt Time: 1440  Pulmonologist Name: [facility] - last apt 9/21/2021  Referrals Initiated: Yes  Pulmonary Rehab: N/A  Smoking Cessation: Yes  $ Smoking Cessation 3-10 Minutes: Symptomatic (5 min)  Hospice: N/A  Home Health Care: N/A  [facility] Outreach: N/A (unable to send referral at this time)  [facility]: N/A  [facility]: N/A  Private In-Home Care Agency: N/A  Is this a COPD exacerbation patient?: No  $ Demo/Eval of SVN's, MDI's and Aerosols:  (Pt has and uses spacer)  (OP) Pulmonary Function Testing: Yes (9/21/2021 - mixed restrictive/obstructive with significant bronchodialator response)    Meds to Beds  Would the patient like to opt in for Bedside Medication Delivery at Discharge?: Yes, interested     MY COPD ACTION " "PLAN     It is recommended that patients and physicians /healthcare providers complete this action plan together. This plan should be discussed at each physician visit and updated as needed.    The green, yellow and red zones show groups of symptoms of COPD. This list of symptoms is not comprehensive, and you may experience other symptoms. In the \"Actions\" column, your healthcare provider has recommended actions for you to take based on your symptoms.    Patient Name: Lorene Haro   YOB: 1975   Last Updated on: 7/23/2021  9:53 AM   Green Zone:  I am doing well today Actions   •  Usual activitiy and exercise level •  Take daily medications   •  Usual amounts of cough and phlegm/mucus •  Use oxygen as prescribed   •  Sleep well at night •  Continue regular exercise/diet plan   •  Appetite is good •  At all times avoid cigarette smoke, inhaled irritants     Daily Medications (these medications are taken every day):   Tiotropium Bromide Monohydrate (Spiriva)  Budesonide-Formoterol Fumarate (Symbicort) 2 Puffs  2 Puffs Once daily  Twice daily     Additional Information:  Use the spacer when taking the Symbicort, and rinse your mouth after taking    Yellow Zone:  I am having a bad day or a COPD flare Actions   •  More breathless than usual •  Continue daily medications   •  I have less energy for my daily activities •  Use quick relief inhaler as ordered   •  Increased or thicker phlegm/mucus •  Use oxygen as prescribed   •  Using quick relief inhaler/nebulizer more often •  Get plenty of rest   •  Swelling of ankles more than usual •  Use pursed lip breathing   •  More coughing than usual •  At all times avoid cigarette smoke, inhaled irritants   •  I feel like I have a \"chest cold\"   •  Poor sleep and my symptoms woke me up   •  My appetite is not good   •  My medicine is not helping    •  Call provider immediately if symptoms don’t improve     Continue daily medications, add rescue medications: "   Albuterol/Ipratropium (Combivent, Duoneb)  Albuterol 3mL via nebulizer  2 Puffs Every 4 hours PRN  Every 4 hours PRN       Medications to be used during a flare up, (as Discussed with Provider):           Additional Information:  Use albuterol inhaler with spacer and rinse nebulizer per manufacturers recommendation    Red Zone:  I need urgent medical care Actions   •  Severe shortness of breath even at rest •  Call 911 or seek medical care immediately   •  Not able to do any activity because of breathing    •  Fever or shaking chills    •  Feeling confused or very drowsy     •  Chest pains    •  Coughing up blood

## 2021-10-07 NOTE — PROGRESS NOTES
Daily Progress Note:     Date of Service: 10/7/2021  Primary Team: [facility] Purple Team   Attending: Sariah Stout M.D.   Senior Resident: Dr. Roberto Carlos Finch  Intern: Dr. Robert Cyr  Contact:  [phone]    Chief Complaint:   Shortness of breath.    Subjective:  No acute events overnight.  Patient was somnolent this morning, describes that she has been like this for about a week now.  Complains of occasional mild headaches.    Review of Systems:   Review of Systems   Constitutional: Positive for chills, fever and malaise/fatigue.   HENT: Negative for hearing loss.    Eyes: Negative for photophobia, pain and discharge.   Respiratory: Positive for cough, sputum production, shortness of breath and wheezing.    Cardiovascular: Positive for palpitations. Negative for chest pain.   Gastrointestinal: Negative for blood in stool, constipation and diarrhea.   Genitourinary: Negative for dysuria and urgency.   Musculoskeletal: Negative for back pain and neck pain.   Skin: Negative for itching.   Neurological: Positive for dizziness and headaches.       Objective Data:   Vitals:   Temp:  [36.1 °C (97 °F)-36.6 °C (97.8 °F)] 36.1 °C (97 °F)  Pulse:  [70-82] 77  Resp:  [16-23] 16  BP: (104-123)/(55-83) 104/60  SpO2:  [89 %-97 %] 93 %  Physical Exam:   Physical Exam  Constitutional:       Appearance: She is obese. She is ill-appearing.   HENT:      Head: Normocephalic and atraumatic.      Right Ear: External ear normal.      Left Ear: External ear normal.      Mouth/Throat:      Mouth: Mucous membranes are moist.   Eyes:      General:         Right eye: No discharge.         Left eye: No discharge.   Cardiovascular:      Comments: Heart sounds muffled.  Pulses seem low-volume.  Pulmonary:      Effort: Respiratory distress present.      Breath sounds: Wheezing and rales present.      Comments: Breath sounds decreased on the left anterior lung field.  Some occasional wheezes present on the posterior lung fields.  Abdominal:       General: Bowel sounds are normal.      Palpations: Abdomen is soft.   Musculoskeletal:         General: Swelling present.   Skin:     General: Skin is warm.      Capillary Refill: Capillary refill takes 2 to 3 seconds.   Neurological:      Mental Status: She is alert and oriented to person, place, and time.         Labs:   WBC 6.1 with 88% neutrophils-polys and lymphocytes decreased at 8.50.  Hemoglobin 11.7  Sodium 143  Potassium 4.6  Chloride 103  Bicarb 30  BUN 12  Creatinine 0.85  GFR > 60  Calcium 9.3  A1c 6.3        Imaging:   Chest x-ray:  Some cardiomegaly.  Airspace opacity in the right middle lung.    Assessment and plan    46-year-old female, COVID vaccinated, former smoker (30 X 2/7 pack years) with PMHx of COPD on 2 L home oxygen (PFT s 9/21, mixed obstructive/restrictive pattern, ratio 86), obesity hypoventilation syndrome(BMI 53), KATHIA(not currently using CPAP) and paroxysmal A. Fib (on Coumadin) presented with shortness of breath and cough productive of yellow sputum found to be Covid positive with lobar pneumonia on x-ray currently on dexamethasone, C3 & azithromycin for possible bacterial pneumonia.  Blood cultures pending.    ##Lobar pneumonia(Covid vs bacterial vs bronchitis)  ##Chronic hypoxemic respiratory failure   ##COPD ##obstructive sleep apnea ##obesity hypoventilation syndrome    Patient reports productive cough with yellow sputum, subjective fevers, generalized weakness and malaise associated with headache ongoing for about a week.  Patient received her Covid vaccination in April and May 2021, she tested positive on 10/6/2021, on enhanced droplet, contact and eye precautions.  On dexamethasone 6 mg X 10 days.  Encouraging incentive spirometry and proning.  Avoiding unnecessary IV fluids.  -Pro-Yong negative at <0.05, CRP increased at 3.88  -Patient uses 2 L of oxygen baseline, requirements increased to 3 L.  She received 1 dose of ceftriaxone and 1 dose of doxycycline in the ER, continuing  empiric antibiotics ceftriaxone and azithromycin, pending blood cultures.  -Patient has a history of COPD, obesity hypoventilation syndrome, obstructive sleep apnea.  PFTs 9/2021 with ratio 86-pulmonology assessment reported a mixed obstructive and restrictive pattern.  -On Symbicort, albuterol and tiotropium inhalers.  Continuing montelukast.  Continuing Flonase.  -Nocturnal CPAP/BiPAP.  -Patient was very somnolent this morning, ABGs ordered that showed pH of 7.43, with PCO2 52.2, PO2 57.3, bicarb 34, oxygen saturation 89.8 with base excess of 8.  -This looks like a mixed respiratory acidosis/metabolic alkalosis picture-continuing CPAP/BiPAP at night.  -Will consider intubation/ICU transfer if condition worsens.    ##Bipolar disorder type II  ##Schizophrenia  Patient has a history of bipolar disorder and schizophrenia with multiple ED visits in the last months.  Continuing home medications, aripiprazole.    ##Smoking history  On nicotine replacement inpatient.    ##Prediabetes  HbA1c increased at 6.3.  Beginning insulin sliding scale in hospital in anticipation of increased blood glucose levels due to steroids.    DVT prophylaxis: Continuing Coumadin

## 2021-10-07 NOTE — ASSESSMENT & PLAN NOTE
PFTs 9/2021 with ratio 86.    Pulmonologist assessment reports mixed restrictive and obstructive disease

## 2021-10-07 NOTE — ED NOTES
Patient placed on purewick catheter, 600 mL straw colored urine produced. Patient denies further needs.

## 2021-10-07 NOTE — DIETARY
NUTRITION SERVICES: BMI - Pt with BMI >40 (=Body mass index is 53.61 kg/m².), Class III obesity. Weight loss counseling not appropriate in acute care setting. RECOMMEND - If appropriate at DC please refer to outpatient nutrition services for weight management.

## 2021-10-07 NOTE — PROGRESS NOTES
4 Eyes Skin Assessment Completed by JC Garcia and JC Hill.    Head Moles on scalp a nd forehead  Ears WDL  Nose WDL  Mouth WDL  Neck WDL  Breast/Chest Discoloration  Shoulder Blades WDL  Spine WDL  (R) Arm/Elbow/Hand WDL  (L) Arm/Elbow/Hand Edema  Abdomen WDL  Groin WDL  Scrotum/Coccyx/Buttocks WDL  (R) Leg Redness and Swelling  (L) Leg Redness and Swelling  (R) Heel/Foot/Toe WDL  (L) Heel/Foot/Toe WDL          Devices In Places Tele Box, Pulse Ox and Nasal Cannula      Interventions In Place Pillows and Pressure Redistribution Mattress    Possible Skin Injury No    Pictures Uploaded Into Epic Yes  Wound Consult Placed N/A  RN Wound Prevention Protocol Ordered No

## 2021-10-07 NOTE — ASSESSMENT & PLAN NOTE
Patient reports being diagnosed with KATHIA, but no recent CPAP use as it is broken.  Will order RT protocol to see if she will be able to use CPAP while admitted

## 2021-10-07 NOTE — PROGRESS NOTES
Lab called with critical result of CO2 52.2 at 1055. Critical lab result read back.   Dr. Stout notified of critical lab result at 1105.  Critical lab result read back by Dr. Stout.

## 2021-10-07 NOTE — THERAPY
"Occupational Therapy   Initial Evaluation     Patient Name: Lorene Haro  Age:  46 y.o., Sex:  female  Medical Record #: [id number]  Today's Date: 10/7/2021     Precautions  Precautions: Fall Risk    Assessment  Patient is 46 y.o. female with history of COPD, obesity, KATHIA, bipolar and schizophrenia admitted to the hospital with COVID19. Pt lives alone but has a caregiver through an agency Monday-Thursday who helps with showering and IADLs. During OT eval, pt very fatigued (almost lethargic) and required extra time and effort to complete all activities. She donned shoes, stood and took side steps and completed grooming and was too tired to participate any further. Pt would benefit from OT services in the acute care setting to maximize her safety and independence for home.       Plan    Recommend Occupational Therapy 2 times per week until therapy goals are met for the following treatments:  Adaptive Equipment, Self Care/Activities of Daily Living, Therapeutic Activities and Therapeutic Exercises.    DC Equipment Recommendations: Unable to determine at this time  Discharge Recommendations: Recommend post-acute placement for additional occupational therapy services prior to discharge home     Subjective    \"When do you think I can go home?\"      Objective       10/07/21 1225   Prior Living Situation   Prior Services Intermittent Physical Support for ADL Per Service   Housing / Facility 1 Story Apartment / Condo   Steps Into Home 0   Steps In Home 0   Elevator Yes   Bathroom Set up Bathtub / Shower Combination;Grab Bars;Shower Chair   Equipment Owned 4-Wheel Walker   Lives with - Patient's Self Care Capacity Alone and Able to Care For Self   Comments Pt reports having paid caregiver Monday through Thursday, no caregiver support Friday-Sunday    Prior Level of ADL Function   Self Feeding Independent   Grooming / Hygiene Independent   Bathing Requires Assist   Dressing Independent   Toileting Independent   Prior Level " "of IADL Function   Driving / Transportation Relatives / Others Provide Transportation   Comments Pt has assist with IADLs from caregiver   Precautions   Precautions Fall Risk   Vitals   O2 (LPM) 3   O2 Delivery Device Silicone Nasal Cannula   Pain 0 - 10 Group   Therapist Pain Assessment During Activity;Nurse Notified;0  (no c/o pain, pt moans due to \"feeling tired\")   Cognition    Cognition / Consciousness X   Level of Consciousness Alert   Comments pleasant and cooperative but lethargic    Active ROM Upper Body   Active ROM Upper Body  WDL   Strength Upper Body   Upper Body Strength  WDL   Upper Body Muscle Tone   Upper Body Muscle Tone  WDL   Coordination Upper Body   Coordination WDL   Balance Assessment   Sitting Balance (Static) Fair +   Sitting Balance (Dynamic) Fair   Standing Balance (Static) Fair -   Standing Balance (Dynamic) Poor +   Weight Shift Sitting Fair   Weight Shift Standing Fair   Comments stood with 4WW   Bed Mobility    Supine to Sit Minimal Assist   Sit to Supine Minimal Assist   Comments requires extra time   ADL Assessment   Grooming Supervision;Seated   Lower Body Dressing Moderate Assist  (slip on shoes )   Toileting   (pure wick catheter )   How much help from another person does the patient currently need...   Putting on and taking off regular lower body clothing? 2   Bathing (including washing, rinsing, and drying)? 2   Toileting, which includes using a toilet, bedpan, or urinal? 2   Putting on and taking off regular upper body clothing? 3   Taking care of personal grooming such as brushing teeth? 3   Eating meals? 4   6 Clicks Daily Activity Score 16   Functional Mobility   Sit to Stand Minimal Assist   Mobility side steps toward the head of the bed    Activity Tolerance   Comments very fatigued    Patient / Family Goals   Patient / Family Goal #1 to go home    Short Term Goals   Short Term Goal # 1 Pt will stand at the sink to groom with supervision    Short Term Goal # 2 Pt will " complete LB dressing with supervision    Short Term Goal # 3 Pt will complete toilet transfers with supervision    Education Group   Role of Occupational Therapist Patient Response Patient;Acceptance;Explanation;Reinforcement Needed   Problem List   Problem List Decreased Active Daily Living Skills;Decreased Functional Mobility;Decreased Activity Tolerance;Decreased Homemaking Skills;Impaired Postural Control / Balance

## 2021-10-07 NOTE — PROGRESS NOTES
Inpatient Anticoagulation Service Note    Date: 10/6/2021    Reason for Anticoagulation: Atrial Fibrillation   Target INR: 2.0 to 3.0  [id number] VASc Score: 6  HAS-BLED Score: 1   Hemoglobin Value: (!) 11.7  Hematocrit Value: 39.1  Lab Platelet Value: 222    INR from last 7 days     Date/Time INR Value    10/06/21 1200 1.76        Dose from last 7 days     Date/Time Dose (mg)    10/06/21 2053 --        Average Dose (mg): 6  Significant Interactions: Antibiotics, Statin  Bridge Therapy: No     Reversal Agent Administered: Not Applicable  Comments: Baseline INR slightly subtherapeutic. Will give a bolus dose tonight. H/H stable, no overt signs of bleeding. Pharmacy will continue to follow daily INR and monitor for changes.    Plan:  Give warfarin 10 mg x1 tonight followed by 6 mg daily    Education Material Provided?: No (miquel w/ [facility] anticoag clinic)  Pharmacist suggested discharge dosing: Resume home dose regimen and follow up with outpatient anticoag clinic within 48-72 hours of discharge.      Brittani Jarvis, PharmD

## 2021-10-07 NOTE — CARE PLAN
The patient is Stable - Low risk of patient condition declining or worsening         Progress made toward(s) clinical / shift goals:      Problem: Pain - Standard  Goal: Alleviation of pain or a reduction in pain to the patient’s comfort goal  Outcome: Progressing     Problem: Knowledge Deficit - Standard  Goal: Patient and family/care givers will demonstrate understanding of plan of care, disease process/condition, diagnostic tests and medications  Outcome: Progressing       Patient is not progressing towards the following goals:

## 2021-10-08 LAB
ALBUMIN SERPL BCP-MCNC: 3.8 G/DL (ref 3.2–4.9)
ALBUMIN/GLOB SERPL: 1.2 G/DL
ALP SERPL-CCNC: 120 U/L (ref 30–99)
ALT SERPL-CCNC: 11 U/L (ref 2–50)
ANION GAP SERPL CALC-SCNC: 8 MMOL/L (ref 7–16)
AST SERPL-CCNC: 11 U/L (ref 12–45)
BILIRUB SERPL-MCNC: 0.4 MG/DL (ref 0.1–1.5)
BUN SERPL-MCNC: 23 MG/DL (ref 8–22)
CALCIUM SERPL-MCNC: 9.4 MG/DL (ref 8.5–10.5)
CHLORIDE SERPL-SCNC: 99 MMOL/L (ref 96–112)
CO2 SERPL-SCNC: 33 MMOL/L (ref 20–33)
CREAT SERPL-MCNC: 0.96 MG/DL (ref 0.5–1.4)
GLOBULIN SER CALC-MCNC: 3.2 G/DL (ref 1.9–3.5)
GLUCOSE BLD-MCNC: 107 MG/DL (ref 65–99)
GLUCOSE BLD-MCNC: 108 MG/DL (ref 65–99)
GLUCOSE BLD-MCNC: 127 MG/DL (ref 65–99)
GLUCOSE SERPL-MCNC: 104 MG/DL (ref 65–99)
INR PPP: 1.69 (ref 0.87–1.13)
POTASSIUM SERPL-SCNC: 4.3 MMOL/L (ref 3.6–5.5)
PROT SERPL-MCNC: 7 G/DL (ref 6–8.2)
PROTHROMBIN TIME: 19.4 SEC (ref 12–14.6)
SODIUM SERPL-SCNC: 140 MMOL/L (ref 135–145)

## 2021-10-08 PROCEDURE — 97110 THERAPEUTIC EXERCISES: CPT | Mod: CQ

## 2021-10-08 PROCEDURE — 80053 COMPREHEN METABOLIC PANEL: CPT

## 2021-10-08 PROCEDURE — 36415 COLL VENOUS BLD VENIPUNCTURE: CPT

## 2021-10-08 PROCEDURE — 700111 HCHG RX REV CODE 636 W/ 250 OVERRIDE (IP): Performed by: STUDENT IN AN ORGANIZED HEALTH CARE EDUCATION/TRAINING PROGRAM

## 2021-10-08 PROCEDURE — A9270 NON-COVERED ITEM OR SERVICE: HCPCS | Performed by: STUDENT IN AN ORGANIZED HEALTH CARE EDUCATION/TRAINING PROGRAM

## 2021-10-08 PROCEDURE — 82962 GLUCOSE BLOOD TEST: CPT | Mod: 91

## 2021-10-08 PROCEDURE — 770020 HCHG ROOM/CARE - TELE (206)

## 2021-10-08 PROCEDURE — 97530 THERAPEUTIC ACTIVITIES: CPT | Mod: CQ

## 2021-10-08 PROCEDURE — A9270 NON-COVERED ITEM OR SERVICE: HCPCS | Performed by: INTERNAL MEDICINE

## 2021-10-08 PROCEDURE — 94660 CPAP INITIATION&MGMT: CPT

## 2021-10-08 PROCEDURE — 700102 HCHG RX REV CODE 250 W/ 637 OVERRIDE(OP): Performed by: INTERNAL MEDICINE

## 2021-10-08 PROCEDURE — 700102 HCHG RX REV CODE 250 W/ 637 OVERRIDE(OP): Performed by: STUDENT IN AN ORGANIZED HEALTH CARE EDUCATION/TRAINING PROGRAM

## 2021-10-08 PROCEDURE — 99232 SBSQ HOSP IP/OBS MODERATE 35: CPT | Performed by: HOSPITALIST

## 2021-10-08 PROCEDURE — 85610 PROTHROMBIN TIME: CPT

## 2021-10-08 PROCEDURE — 700105 HCHG RX REV CODE 258: Performed by: STUDENT IN AN ORGANIZED HEALTH CARE EDUCATION/TRAINING PROGRAM

## 2021-10-08 RX ORDER — OMEPRAZOLE 20 MG/1
20 CAPSULE, DELAYED RELEASE ORAL DAILY
Status: DISCONTINUED | OUTPATIENT
Start: 2021-10-08 | End: 2021-10-13 | Stop reason: HOSPADM

## 2021-10-08 RX ORDER — AZITHROMYCIN 250 MG/1
500 TABLET, FILM COATED ORAL DAILY
Status: DISCONTINUED | OUTPATIENT
Start: 2021-10-08 | End: 2021-10-08

## 2021-10-08 RX ORDER — AZITHROMYCIN 250 MG/1
500 TABLET, FILM COATED ORAL DAILY
Status: DISCONTINUED | OUTPATIENT
Start: 2021-10-08 | End: 2021-10-09

## 2021-10-08 RX ADMIN — MONTELUKAST 10 MG: 10 TABLET, FILM COATED ORAL at 20:30

## 2021-10-08 RX ADMIN — WARFARIN SODIUM 6 MG: 6 TABLET ORAL at 16:36

## 2021-10-08 RX ADMIN — DEXAMETHASONE 6 MG: 6 TABLET ORAL at 05:07

## 2021-10-08 RX ADMIN — FLUTICASONE PROPIONATE 100 MCG: 50 SPRAY, METERED NASAL at 16:36

## 2021-10-08 RX ADMIN — TIOTROPIUM BROMIDE INHALATION SPRAY 5 MCG: 3.12 SPRAY, METERED RESPIRATORY (INHALATION) at 09:06

## 2021-10-08 RX ADMIN — CEFTRIAXONE SODIUM 2 G: 2 INJECTION, POWDER, FOR SOLUTION INTRAMUSCULAR; INTRAVENOUS at 05:07

## 2021-10-08 RX ADMIN — NICOTINE 14 MG: 14 PATCH TRANSDERMAL at 05:07

## 2021-10-08 RX ADMIN — ATORVASTATIN CALCIUM 20 MG: 20 TABLET, FILM COATED ORAL at 16:36

## 2021-10-08 RX ADMIN — AZITHROMYCIN 500 MG: 250 TABLET, FILM COATED ORAL at 20:30

## 2021-10-08 RX ADMIN — OMEPRAZOLE 20 MG: 20 CAPSULE, DELAYED RELEASE ORAL at 12:00

## 2021-10-08 RX ADMIN — ARIPIPRAZOLE 30 MG: 15 TABLET ORAL at 05:07

## 2021-10-08 RX ADMIN — TRAZODONE HYDROCHLORIDE 50 MG: 100 TABLET ORAL at 20:30

## 2021-10-08 RX ADMIN — FLUTICASONE PROPIONATE 100 MCG: 50 SPRAY, METERED NASAL at 05:07

## 2021-10-08 ASSESSMENT — COGNITIVE AND FUNCTIONAL STATUS - GENERAL
MOVING FROM LYING ON BACK TO SITTING ON SIDE OF FLAT BED: A LOT
WALKING IN HOSPITAL ROOM: A LOT
STANDING UP FROM CHAIR USING ARMS: A LOT
SUGGESTED CMS G CODE MODIFIER MOBILITY: CL
CLIMB 3 TO 5 STEPS WITH RAILING: A LOT
MOVING TO AND FROM BED TO CHAIR: A LOT
TURNING FROM BACK TO SIDE WHILE IN FLAT BAD: A LOT
MOBILITY SCORE: 12

## 2021-10-08 ASSESSMENT — GAIT ASSESSMENTS: GAIT LEVEL OF ASSIST: UNABLE TO PARTICIPATE

## 2021-10-08 NOTE — THERAPY
Physical Therapy   Daily Treatment     Patient Name: Lorene Haro  Age:  46 y.o., Sex:  female  Medical Record #: [id number]  Today's Date: 10/8/2021     Precautions  Precautions: Fall Risk    Assessment    Pt was pleasant, agreeable to therapy session. Less lethargic today but continues to be limited due to poor endurance, strength and balance. She required Donnie to reach EOB with bed rail and extra time. Vc to not hold my breath during activity. Seated rest break once EOB performed seated exercises and educated on supine exercises to perform while laying in supine. She completed STS with Donnie and side stepping along side of bed. Will continue to follow while in house.     Plan    Continue current treatment plan.    DC Equipment Recommendations: Unable to determine at this time  Discharge Recommendations: Recommend post-acute placement for additional physical therapy services prior to discharge home         10/08/21 1440   Other Treatments   Other Treatments Provided seated exercises laq, marches, ankle pumps, glute sets. Educated on supine exercise heel slides and quad sets.    Balance   Sitting Balance (Static) Fair +   Sitting Balance (Dynamic) Fair   Standing Balance (Static) Fair -   Standing Balance (Dynamic) Poor +   Weight Shift Sitting Fair   Weight Shift Standing Fair   Gait Analysis   Gait Level Of Assist Unable to Participate   Comments pre gait, side stepping. lateral weight shifting and mini marching    Bed Mobility    Supine to Sit Minimal Assist   Sit to Supine Minimal Assist   Skilled Intervention Verbal Cuing   Comments cues to not hold breath with movement. Use of bed rail, Donnie    Functional Mobility   Sit to Stand Minimal Assist   Skilled Intervention Verbal Cuing   Comments STS multiple times with Donnie    Short Term Goals    Short Term Goal # 1 pt will be able to perform supine<>sit with HOB flat/no rails with Spv in 6tx to promote fnx progression towards I    Goal Outcome # 1 goal not met    Short Term Goal # 2 pt will be able to perform sit<>stand/transfers with 4WW with Spv in 6tx to promote fnx progression towards I    Goal Outcome # 2 Goal not met   Short Term Goal # 3 pt will be able to ambulate 75ft with 4WW with Spv in 6tx to promote fnx progression towards I    Goal Outcome # 3 Goal not met

## 2021-10-08 NOTE — PROGRESS NOTES
Daily Progress Note:     Date of Service: 10/8/2021  Primary Team: [facility]   Attending: Sariah Stout M.D.   Senior Resident: Dr. Roberto Carlos Finch  Intern: Dr. Robert Cyr  Contact:  [phone]    Chief Complaint:   Shortness of breath.    Subjective/interval update:  Patient was complaining of some gastric pain last night and received simethicone chewable tablet overnight.  Her monitor summary showed frequent PACs.  This morning she was on her CPAP lying in bed, complaining of being in and uncomfortable position, was repositioned in bed.  Denies any fever or chills.  Shortness of breath present but subjectively improved.  Complains of some wheezing, cough productive of sputum.  ROS positive for malaise/fatigue.      Objective Data:   Vitals:   Temp:  [36 °C (96.8 °F)-36.8 °C (98.3 °F)] 36.8 °C (98.3 °F)  Pulse:  [61-82] 69  Resp:  [18-20] 20  BP: ()/() 153/106  SpO2:  [91 %-96 %] 96 %  Physical Exam:   Patient is obese, appears ill.  No discharge evident from the eyes.  No nasal congestion.  Mouth moist.  Heart sounds muffled, pulses low-volume.  Increased respiratory effort evident, lung sounds decreased with some occasional wheezing present.  Right and left lower leg edema present, +1, pitting.  A&O X4      Labs:   Bicarb 33  Anion gap 8  BUN 23  Creatinine 0.96  GFR > 60  AST 11  ALT 11  Alk phos 120  Total bilirubin 0.4  Albumin 3.8  A1c 6.3    Imaging:   Chest x-ray:  Some cardiomegaly.  Airspace opacity in the right middle lung.    Assessment and plan    46-year-old female, COVID vaccinated, former smoker (30 X 2/7 pack years) with PMHx of COPD on 2 L home oxygen (PFT s 9/21, mixed obstructive/restrictive pattern, ratio 86), obesity hypoventilation syndrome(BMI 53), KATHIA(was not using CPAP) and paroxysmal A. Fib (on Coumadin) presented with shortness of breath and cough productive of yellow sputum found to be Covid positive with lobar pneumonia on x-ray currently on dexamethasone, C3  & azithromycin for possible bacterial pneumonia.  Blood cultures pending.    ##Lobar pneumonia(Covid vs bacterial vs bronchitis)  ##Chronic hypoxemic respiratory failure   ##COPD ##obstructive sleep apnea ##obesity hypoventilation syndrome  10/7 patient reports productive cough with yellow sputum, subjective fevers, generalized weakness and malaise associated with headache ongoing for about a week.  Patient received her Covid vaccination in April and May 2021, she tested positive on 10/6/2021, currently on enhanced droplet, contact and eye precautions.  On dexamethasone 6 mg X 10 days.  Encouraging incentive spirometry and proning.  Avoiding unnecessary IV fluids.  -Pro-Yong negative at <0.05, CRP increased at 3.88  -Patient uses 2 L of oxygen baseline, requirements increased to 5 L 10/8.  She received 1 dose of ceftriaxone and 1 dose of doxycycline in the ER, continuing empiric antibiotics ceftriaxone and azithromycin, blood cultures NTD.  -Patient has a history of COPD, obesity hypoventilation syndrome, obstructive sleep apnea.  PFTs 9/2021 with ratio 86-pulmonology assessment reported a mixed obstructive and restrictive pattern.  -On Symbicort, albuterol and tiotropium inhalers.  Continuing montelukast.  Continuing Flonase.  -Nocturnal CPAP/BiPAP.  -Patient was very somnolent  10/7 morning, ABGs ordered that showed pH of 7.43, with PCO2 52.2, PO2 57.3, bicarb 34, oxygen saturation 89.8 with base excess of 8.  -This looks like a mixed respiratory acidosis/metabolic alkalosis picture-continuing CPAP/BiPAP at night.  -Will consider intubation/ICU transfer if condition worsens.  Patient is on dexamethasone, 10/8 BUN increased to 23, creatinine 0.96.  The mild increase in BUN could be due to ##dehydration or ##upper GI problem/bleed/gastritis in the setting of steroid use.  Beginning omeprazole.  We will continue to monitor the BUN and creatinine levels for any improvement.    ##Bipolar disorder type  II  ##Schizophrenia  Patient has a history of bipolar disorder and schizophrenia with multiple ED visits in the last months.  Continuing home medications, aripiprazole.    ##Smoking history  On nicotine replacement inpatient.    ##Prediabetes  HbA1c increased at 6.3.  Beginning insulin sliding scale in hospital in anticipation of increased blood glucose levels due to steroids.    DVT prophylaxis: Continuing Coumadin

## 2021-10-08 NOTE — DISCHARGE PLANNING
Care Transition Team Discharge Planning    Anticipated Discharge Disposition: DC home - pending.    Action: Lsw spoke with patient via phone to obtain information for the CTT assessment. Patient presented being A&Ox4. Patient reported that the information on the facesheet is accurate. Patient reports that she lives alone. She does have family who checks on her regularly. Patient reports that she has assistance with ADLs.     Barriers to Discharge: Awaiting medical clearance.    Plan: Lsw will assist medical team with DC planning.    Care Transition Team Assessment    Information Source  Orientation Level: Oriented X4  Information Given By: Patient  Informant's Name:  (Lorene Haro)  Who is responsible for making decisions for patient? : Patient    Readmission Evaluation  Is this a readmission?: Yes - unplanned readmission  Why do you think you were readmitted?:  (COVID)  Was an appointment arranged for you prior to discharge?: No  Were there new prescriptions you were supposed to fill after you were discharged?: No  Did you understand your discharge instructions?: Yes  Did you have enough support after your last discharge?: Yes    Elopement Risk  Legal Hold: No  Ambulatory or Self Mobile in Wheelchair: No-Not an Elopement Risk  Elopement Risk: Not at Risk for Elopement    Interdisciplinary Discharge Planning  Lives with - Patient's Self Care Capacity: Alone and Able to Care For Self  Patient or legal guardian wants to designate a caregiver: No  Housing / Facility: 1 Story Apartment / Condo  Prior Services: Intermittent Physical Support for ADL Per Service    Discharge Preparedness  What is your plan after discharge?: Home with help  What are your discharge supports?: Parent, Sibling, Other (comment) (uncle)  Prior Functional Level: Ambulatory, Uses Walker, Independent with Medication Management, Needs Assist with Activities of Daily Living  Difficulity with ADLs: Bathing  Difficulty with ADLs Comment: Patient has  caretakers who assist with ADLs  Difficulity with IADLs: Driving, Cooking, Shopping  Difficulity with IADL Comments: Family assists patient as needed.    Functional Assesment  Prior Functional Level: Ambulatory, Uses Walker, Independent with Medication Management, Needs Assist with Activities of Daily Living    Finances  Financial Barriers to Discharge: No  Prescription Coverage: Yes              Advance Directive  Advance Directive?: DPOA for Health Care  Durable Power of  Name and Contact : Kylah Childressbrett, sister & Funmi Vance    Domestic Abuse  Have you ever been the victim of abuse or violence?: No    Psychological Assessment  History of Substance Abuse: None  History of Psychiatric Problems: Yes (Depression, anxiety, schizophrenia, bipolar)  Non-compliant with Treatment: No  Newly Diagnosed Illness: Yes    Discharge Risks or Barriers  Discharge risks or barriers?: No  Patient risk factors: Vulnerable adult, Bariatric, Complex medical needs, Readmission, Cognitive / sensory / physical deficit    Anticipated Discharge Information  Discharge Disposition: Discharged to home/self care (01)  Discharge Address: [address] [place] [address] [address], [place] [address]  Discharge Contact Phone Number:  ([phone])

## 2021-10-08 NOTE — CARE PLAN
The patient is Stable - Low risk of patient condition declining or worsening    Shift Goals  Clinical Goals: maintain adequate oxygenation; work with PT/OT; secure safe, appropriate discharge plan  Patient Goals: to go home  Family Goals: n/a    Progress made toward(s) clinical / shift goals:  Was able to titrate O2 down to 4L nasal cannula with pulse ox sats maintaining in low 90s. No falls or injuries; fall precautions engaged.    Patient is not progressing towards the following goals: Lorene is able to log roll in bed, but is overall weak; was able to walk to bathroom and back this afternoon. She is dependent on staff to assist with ADLs.        Problem: Mobility  Goal: Patient's capacity to carry out activities will improve  Outcome: Progressing     Problem: Self Care  Goal: Patient will have the ability to perform ADLs independently or with assistance (bathe, groom, dress, toilet and feed)  Outcome: Not Progressing         Problem: Respiratory  Goal: Patient will achieve/maintain optimum respiratory ventilation and gas exchange  Outcome: Progressing     Problem: Fall Risk  Goal: Patient will remain free from falls  Outcome: Progressing

## 2021-10-08 NOTE — PROGRESS NOTES
Received bedside report from JC Irene, pt care assumed. VSS, pt assessment complete. Pt AAOx4, c/o gastric pain at this time. POC discussed with pt and verbalizes no questions. Pt denies any additional needs at this time. Bed locked and in lowest position, bed alarm on. Pt educated on fall risk and verbalized understanding, call light within reach, hourly rounding initiated.     Crisis charting in effect due to COVID-19 Surge.

## 2021-10-08 NOTE — PROGRESS NOTES
Inpatient Anticoagulation Service Note    Date: 10/8/2021  Reason for Anticoagulation: Atrial Fibrillation  Hemoglobin Value: (Abnormal) 11.7  Hematocrit Value: 39.1  Lab Platelet Value: 222  Target INR: 2.0 to 3.0  INR from last 7 days     Date/Time INR Value    10/08/21 0331 (Abnormal) 1.69    10/07/21 1041 (Abnormal) 1.45    10/06/21 1200 (Abnormal) 1.76        Dose from last 7 days     Date/Time Dose (mg)    10/08/21 1014 6    10/07/21 1225 6    10/06/21 2053 10        Average Dose (mg): 6  Significant Interactions: Antibiotics, Statin, Corticosteroids, PPI,  Other (Comments) (trazodone)  Bridge Therapy: No   Reversal Agent Administered: Not Applicable    A/P  Sub-therapeutic INR, trending up by 0.24 in past 24 hours.  Reviewed outpatient warfarin dosing per note 9/30 (9 mg on Thursday and 6 mg PO AOD).  Per med rec appears patient has been taking warfarin 6 mg PO daily.  Given acute illness, new DDI and recent bolus dose, will continue with warfarin 6 mg PO daily for now.  INR tomorrow.    Education Material Provided?: No (chronic warfarin patient)  Pharmacist suggested discharge dosing: TBD pending INR trends, perhaps warfarin 9 mg PO every Thursday and 6 mg PO AOD.  Recommend a follow-up PT/INR within 48-72 hours of discharge.     Joy Liu, PharmD, BCPS, BCCCP

## 2021-10-08 NOTE — PROGRESS NOTES
Monitor Summary: SB/SR 58-70, [place] -0.16, QRS -0.07, QT -0.37, with rare PAC and PVC per strip from the monitor room.

## 2021-10-08 NOTE — CARE PLAN
"The patient is Stable - Low risk of patient condition declining or worsening    Shift Goals  Clinical Goals: monitor O2 levels, sleep  Patient Goals: \"Get a new IV\"    Progress made toward(s) clinical / shift goals:      Problem: Pain - Standard  Goal: Alleviation of pain or a reduction in pain to the patient’s comfort goal  Outcome: Progressing     Problem: Knowledge Deficit - Standard  Goal: Patient and family/care givers will demonstrate understanding of plan of care, disease process/condition, diagnostic tests and medications  Outcome: Progressing  Note: Reviewed plan of care with patient.     Problem: Fall Risk  Goal: Patient will remain free from falls  Outcome: Progressing       Patient is not progressing towards the following goals:      "

## 2021-10-09 LAB
ALBUMIN SERPL BCP-MCNC: 3.7 G/DL (ref 3.2–4.9)
ALBUMIN/GLOB SERPL: 1.2 G/DL
ALP SERPL-CCNC: 106 U/L (ref 30–99)
ALT SERPL-CCNC: 8 U/L (ref 2–50)
ANION GAP SERPL CALC-SCNC: 8 MMOL/L (ref 7–16)
AST SERPL-CCNC: 11 U/L (ref 12–45)
BILIRUB SERPL-MCNC: 0.4 MG/DL (ref 0.1–1.5)
BUN SERPL-MCNC: 24 MG/DL (ref 8–22)
CALCIUM SERPL-MCNC: 9.1 MG/DL (ref 8.5–10.5)
CHLORIDE SERPL-SCNC: 100 MMOL/L (ref 96–112)
CO2 SERPL-SCNC: 31 MMOL/L (ref 20–33)
CREAT SERPL-MCNC: 1.05 MG/DL (ref 0.5–1.4)
ERYTHROCYTE [DISTWIDTH] IN BLOOD BY AUTOMATED COUNT: 50.9 FL (ref 35.9–50)
GLOBULIN SER CALC-MCNC: 3.2 G/DL (ref 1.9–3.5)
GLUCOSE BLD-MCNC: 99 MG/DL (ref 65–99)
GLUCOSE SERPL-MCNC: 83 MG/DL (ref 65–99)
HCT VFR BLD AUTO: 36.3 % (ref 37–47)
HGB BLD-MCNC: 10.9 G/DL (ref 12–16)
INR PPP: 1.6 (ref 0.87–1.13)
MAGNESIUM SERPL-MCNC: 2.3 MG/DL (ref 1.5–2.5)
MCH RBC QN AUTO: 26.2 PG (ref 27–33)
MCHC RBC AUTO-ENTMCNC: 30 G/DL (ref 33.6–35)
MCV RBC AUTO: 87.3 FL (ref 81.4–97.8)
PHOSPHATE SERPL-MCNC: 2.5 MG/DL (ref 2.5–4.5)
PLATELET # BLD AUTO: 199 K/UL (ref 164–446)
PMV BLD AUTO: 11.4 FL (ref 9–12.9)
POTASSIUM SERPL-SCNC: 4.1 MMOL/L (ref 3.6–5.5)
PROT SERPL-MCNC: 6.9 G/DL (ref 6–8.2)
PROTHROMBIN TIME: 18.6 SEC (ref 12–14.6)
RBC # BLD AUTO: 4.16 M/UL (ref 4.2–5.4)
SODIUM SERPL-SCNC: 139 MMOL/L (ref 135–145)
WBC # BLD AUTO: 3.9 K/UL (ref 4.8–10.8)

## 2021-10-09 PROCEDURE — 700105 HCHG RX REV CODE 258: Performed by: INTERNAL MEDICINE

## 2021-10-09 PROCEDURE — 84100 ASSAY OF PHOSPHORUS: CPT

## 2021-10-09 PROCEDURE — A9270 NON-COVERED ITEM OR SERVICE: HCPCS | Performed by: STUDENT IN AN ORGANIZED HEALTH CARE EDUCATION/TRAINING PROGRAM

## 2021-10-09 PROCEDURE — 36415 COLL VENOUS BLD VENIPUNCTURE: CPT

## 2021-10-09 PROCEDURE — 700102 HCHG RX REV CODE 250 W/ 637 OVERRIDE(OP): Performed by: STUDENT IN AN ORGANIZED HEALTH CARE EDUCATION/TRAINING PROGRAM

## 2021-10-09 PROCEDURE — 80053 COMPREHEN METABOLIC PANEL: CPT

## 2021-10-09 PROCEDURE — 99231 SBSQ HOSP IP/OBS SF/LOW 25: CPT | Performed by: HOSPITALIST

## 2021-10-09 PROCEDURE — 83735 ASSAY OF MAGNESIUM: CPT

## 2021-10-09 PROCEDURE — A9270 NON-COVERED ITEM OR SERVICE: HCPCS | Performed by: INTERNAL MEDICINE

## 2021-10-09 PROCEDURE — 94660 CPAP INITIATION&MGMT: CPT

## 2021-10-09 PROCEDURE — 85027 COMPLETE CBC AUTOMATED: CPT

## 2021-10-09 PROCEDURE — 700102 HCHG RX REV CODE 250 W/ 637 OVERRIDE(OP): Performed by: INTERNAL MEDICINE

## 2021-10-09 PROCEDURE — 770020 HCHG ROOM/CARE - TELE (206)

## 2021-10-09 PROCEDURE — 700111 HCHG RX REV CODE 636 W/ 250 OVERRIDE (IP): Performed by: INTERNAL MEDICINE

## 2021-10-09 PROCEDURE — 94640 AIRWAY INHALATION TREATMENT: CPT

## 2021-10-09 PROCEDURE — 82962 GLUCOSE BLOOD TEST: CPT

## 2021-10-09 PROCEDURE — 85610 PROTHROMBIN TIME: CPT

## 2021-10-09 RX ORDER — AZITHROMYCIN 250 MG/1
500 TABLET, FILM COATED ORAL DAILY
Status: DISCONTINUED | OUTPATIENT
Start: 2021-10-09 | End: 2021-10-09

## 2021-10-09 RX ORDER — AZITHROMYCIN 250 MG/1
500 TABLET, FILM COATED ORAL DAILY
Status: COMPLETED | OUTPATIENT
Start: 2021-10-09 | End: 2021-10-09

## 2021-10-09 RX ORDER — SODIUM CHLORIDE 9 MG/ML
1000 INJECTION, SOLUTION INTRAVENOUS ONCE
Status: COMPLETED | OUTPATIENT
Start: 2021-10-09 | End: 2021-10-09

## 2021-10-09 RX ADMIN — ARIPIPRAZOLE 30 MG: 15 TABLET ORAL at 04:33

## 2021-10-09 RX ADMIN — MONTELUKAST 10 MG: 10 TABLET, FILM COATED ORAL at 20:19

## 2021-10-09 RX ADMIN — FLUTICASONE PROPIONATE 100 MCG: 50 SPRAY, METERED NASAL at 04:34

## 2021-10-09 RX ADMIN — TIOTROPIUM BROMIDE INHALATION SPRAY 5 MCG: 3.12 SPRAY, METERED RESPIRATORY (INHALATION) at 07:32

## 2021-10-09 RX ADMIN — WARFARIN SODIUM 6 MG: 6 TABLET ORAL at 18:19

## 2021-10-09 RX ADMIN — DEXAMETHASONE 6 MG: 6 TABLET ORAL at 04:34

## 2021-10-09 RX ADMIN — NICOTINE 14 MG: 14 PATCH TRANSDERMAL at 04:34

## 2021-10-09 RX ADMIN — ATORVASTATIN CALCIUM 20 MG: 20 TABLET, FILM COATED ORAL at 17:33

## 2021-10-09 RX ADMIN — SODIUM CHLORIDE 1000 ML: 9 INJECTION, SOLUTION INTRAVENOUS at 13:54

## 2021-10-09 RX ADMIN — ALBUTEROL SULFATE 2 PUFF: 90 AEROSOL, METERED RESPIRATORY (INHALATION) at 09:11

## 2021-10-09 RX ADMIN — OMEPRAZOLE 20 MG: 20 CAPSULE, DELAYED RELEASE ORAL at 04:34

## 2021-10-09 RX ADMIN — CEFTRIAXONE SODIUM 2 G: 2 INJECTION, POWDER, FOR SOLUTION INTRAMUSCULAR; INTRAVENOUS at 05:05

## 2021-10-09 RX ADMIN — TRAZODONE HYDROCHLORIDE 50 MG: 100 TABLET ORAL at 20:20

## 2021-10-09 RX ADMIN — AZITHROMYCIN 500 MG: 250 TABLET, FILM COATED ORAL at 20:19

## 2021-10-09 RX ADMIN — FLUTICASONE PROPIONATE 100 MCG: 50 SPRAY, METERED NASAL at 18:00

## 2021-10-09 ASSESSMENT — PAIN DESCRIPTION - PAIN TYPE: TYPE: ACUTE PAIN

## 2021-10-09 ASSESSMENT — FIBROSIS 4 INDEX: FIB4 SCORE: 0.94

## 2021-10-09 NOTE — CARE PLAN
The patient is Stable - Low risk of patient condition declining or worsening    Shift Goals  Clinical Goals: secure appropriate discharge plan; perform post-void bladder scan, per MD order  Patient Goals: to go home  Family Goals: n/a    Progress made toward(s) clinical / shift goals:  Able to reduce oxygen to 3L nasal cannula with pulse ox sats maintaining at 92%. No falls or injuries; fall precautions engaged.  Lorene is able to walk to bathroom with walker, although does not like to; not motivated to mobilize.     Patient is not progressing towards the following goals: Lorene does still rely on staff to assist with ADLs.        Problem: Self Care  Goal: Patient will have the ability to perform ADLs independently or with assistance (bathe, groom, dress, toilet and feed)  Outcome: Not Progressing         Problem: Mobility  Goal: Patient's capacity to carry out activities will improve  Outcome: Progressing     Problem: Respiratory  Goal: Patient will achieve/maintain optimum respiratory ventilation and gas exchange  Outcome: Progressing     Problem: Fall Risk  Goal: Patient will remain free from falls  Outcome: Progressing

## 2021-10-09 NOTE — PROGRESS NOTES
Daily Progress Note:     Date of Service: 10/9/2021  Primary Team: [facility] Team   Attending: Sariah Stout M.D.   Senior Resident: Dr. Roberto Carlos Finch  Intern: Dr. Robert Cyr  Contact:  [phone]    Chief Complaint:   Shortness of breath.    Subjective/interval update/Review of systems:  No acute events overnight. Patient says that she feels ok, denies any fever, chills. Chest pain pr palpitations. Bowel movements regular-no constipation.  Complains of increased frequency of urination, with decreased amount of urination.  She has been walking to the restroom herself without any problems, Purwick was placed during the night, removing it for the day.    Objective Data:   Vitals:   Temp:  [36.5 °C (97.7 °F)-37.4 °C (99.4 °F)] 36.9 °C (98.4 °F)  Pulse:  [63-70] 63  Resp:  [18-22] 20  BP: (104-139)/() 139/125  SpO2:  [87 %-93 %] 90 %  Physical Exam:   Patient is obese, does not appear in acute distress.  No nasal Congestion.  Heart sounds muffled.  Pulses low volume (radial and dorsalis pedis B/L)  Some expiratory wheezing evident on the Right chest/lung with occasional crackles.  Breath sounds decreased on the left lung.  Edema present on the bilateral lower extremities, more on the feet than on the legs, Painful to palpation, non-pitting.    Labs:   WBCs 3.9  Cr 1.05  BUN 24  GFR 56  INR 1.60  PT 18.6    Imaging:   Chest x-ray:  Some cardiomegaly.  Airspace opacity in the right middle lung.    Assessment and plan    46-year-old female, COVID vaccinated, former smoker (30 X 2/7 pack years) with PMHx of COPD on 2 L home oxygen (PFT s 9/21, mixed obstructive/restrictive pattern, ratio 86), obesity hypoventilation syndrome(BMI 53), KATHIA(was not using CPAP) and paroxysmal A. Fib (on Coumadin) presented with shortness of breath and cough productive of yellow sputum found to be Covid positive with lobar pneumonia on x-ray currently on dexamethasone, C3 & azithromycin for possible bacterial pneumonia.  Blood  cultures NTD.    ##Lobar pneumonia(Covid vs bacterial vs bronchitis)  ##Chronic hypoxemic respiratory failure   ##COPD ##obstructive sleep apnea ##obesity hypoventilation syndrome  10/7 patient reports productive cough with yellow sputum, subjective fevers, generalized weakness and malaise associated with headache ongoing for about a week.  Patient received her Covid vaccination in April and May 2021, she tested positive on 10/6/2021, currently on enhanced droplet, contact and eye precautions.  On dexamethasone 6 mg X 10 days(ending 10/16).  Encouraging incentive spirometry and proning.  Avoiding unnecessary IV fluids.  -Pro-Yong negative at <0.05, CRP increased at 3.88  -Patient uses 2 L of oxygen baseline, requirements increased to 4 L 10/9.  She received 1 dose of ceftriaxone and 1 dose of doxycycline in the ER, continuing empiric antibiotics ceftriaxone x 5 days(ending 10/10) and azithromycin x 3days(ending 10/9), blood cultures NTD.  -Patient has a history of COPD, obesity hypoventilation syndrome, obstructive sleep apnea.  PFTs 9/2021 -pulmonology assessment reported a mixed obstructive and restrictive pattern.  -On Symbicort, albuterol and tiotropium inhalers.  Continuing montelukast.  Continuing Flonase.  -Nocturnal CPAP.  -Patient was very somnolent  10/7 morning, ABGs ordered that showed pH of 7.43, with PCO2 52.2, PO2 57.3, bicarb 34, oxygen saturation 89.8 with base excess of 8.  -This looks like a mixed respiratory acidosis/metabolic alkalosis picture-continuing CPAP/BiPAP at night.  -Will consider intubation/ICU transfer if condition worsens.  ##MONI ##Gastritis  -Gastritis sec to steroid use- on omeprazole 20mg for gastric protection.  -BUN, Cr increasing with GFR decreased to 56 (10/9)-1000 cc NS bolus due to pre renal MONI sec to dehydration, decreased oral intake and insen water loss.  Risk of overflow incontinence-bladder scan 10/9, will f/u and add oxybutynin if required.  ##Bipolar disorder type  II  ##Schizophrenia  Patient has a history of bipolar disorder and schizophrenia with multiple ED visits in the last months.  Continuing home medications, aripiprazole.  ##Smoking history  On nicotine replacement inpatient.  ##Prediabetes  HbA1c increased at 6.3.  Began insulin sliding scale in hospital in anticipation of increased blood glucose levels due to steroids.Sliding scale discontinued due to no insulin requirements oct-9.    DVT prophylaxis: Continuing Coumadin

## 2021-10-09 NOTE — PROGRESS NOTES
Telemetry monitoring: SR with heart rate between 64 - 75 bpm. Some PACs    0.14/0.08/0.35

## 2021-10-09 NOTE — PROGRESS NOTES
Inpatient Anticoagulation Service Note    Date: 10/9/2021  Reason for Anticoagulation: Atrial Fibrillation  Hemoglobin Value: (Abnormal) 10.9  Hematocrit Value: (Abnormal) 36.3  Lab Platelet Value: 199  Target INR: 2.0 to 3.0  INR from last 7 days     Date/Time INR Value    10/09/21 02:42:01 (Abnormal) 1.6    10/08/21 0331 (Abnormal) 1.69    10/07/21 1041 (Abnormal) 1.45    10/06/21 1200 (Abnormal) 1.76        Dose from last 7 days     Date/Time Dose (mg)    10/09/21 1309 6    10/08/21 1014 6    10/07/21 1225 6    10/06/21 2053 10        Average Dose (mg): 6  Significant Interactions: Antibiotics, Corticosteroids, Proton Pump Inhibitor, Statin, Other (Comments) (trazodone)  Bridge Therapy: No   Reversal Agent Administered: Not Applicable    A/P  Sub-therapeutic INR, small decrease from yesterday.  Reviewed outpatient warfarin dosing per note 9/30 (9 mg on Thursday and 6 mg PO AOD).  Per med rec appears patient has been taking warfarin 6 mg PO daily.  Still  have not seen full impact of 10 mg dose - if INR not trending up tomorrow will consider increased dosing tomorrow. INR tomorrow.    Education Material Provided?: No (chronic warfarin patient)  Pharmacist suggested discharge dosing: TBD pending INR trends, perhaps warfarin 9 mg PO every Thursday and 6 mg PO AOD.  Recommend a follow-up PT/INR within 48-72 hours of discharge.     Joy Liu, PharmD, BCPS, BCCCP

## 2021-10-09 NOTE — CARE PLAN
The patient is Stable - Low risk of patient condition declining or worsening    Shift Goals  Clinical Goals: maintain adequate oxygenation; work with PT/OT; secure safe, appropriate discharge plan  Patient Goals: to go home  Family Goals: n/a    Progress made toward(s) clinical / shift goals:    Problem: Pain - Standard  Goal: Alleviation of pain or a reduction in pain to the patient’s comfort goal  Outcome: Progressing     Problem: Knowledge Deficit - Standard  Goal: Patient and family/care givers will demonstrate understanding of plan of care, disease process/condition, diagnostic tests and medications  Outcome: Progressing  Note: Patient is compliant with plan of care.       Patient is not progressing towards the following goals:

## 2021-10-09 NOTE — PROGRESS NOTES
Assumed care of patient at 1900 and received report from day shift. Telemetry cardiac monitoring is in place. Patient AOx4 and anxious. Patient oriented to use of call light and encouraged to ambulate with FWW to restroom. Purwick replaced for this evening due to incontinence concerns. Patient agreeable to removal of purwick in the morning. Patient refused bedtime finger glucose check. Call light remains within patient reach. Monitoring continues.    COVID-19 surge in effect.

## 2021-10-10 LAB
ALBUMIN SERPL BCP-MCNC: 3.5 G/DL (ref 3.2–4.9)
ALBUMIN/GLOB SERPL: 1.1 G/DL
ALP SERPL-CCNC: 96 U/L (ref 30–99)
ALT SERPL-CCNC: 11 U/L (ref 2–50)
ANION GAP SERPL CALC-SCNC: 7 MMOL/L (ref 7–16)
AST SERPL-CCNC: 16 U/L (ref 12–45)
BILIRUB SERPL-MCNC: 0.4 MG/DL (ref 0.1–1.5)
BUN SERPL-MCNC: 22 MG/DL (ref 8–22)
CALCIUM SERPL-MCNC: 8.7 MG/DL (ref 8.5–10.5)
CHLORIDE SERPL-SCNC: 100 MMOL/L (ref 96–112)
CO2 SERPL-SCNC: 31 MMOL/L (ref 20–33)
CREAT SERPL-MCNC: 1.01 MG/DL (ref 0.5–1.4)
GLOBULIN SER CALC-MCNC: 3.1 G/DL (ref 1.9–3.5)
GLUCOSE SERPL-MCNC: 84 MG/DL (ref 65–99)
INR PPP: 1.46 (ref 0.87–1.13)
POTASSIUM SERPL-SCNC: 4.1 MMOL/L (ref 3.6–5.5)
PROT SERPL-MCNC: 6.6 G/DL (ref 6–8.2)
PROTHROMBIN TIME: 17.3 SEC (ref 12–14.6)
SODIUM SERPL-SCNC: 138 MMOL/L (ref 135–145)

## 2021-10-10 PROCEDURE — 94760 N-INVAS EAR/PLS OXIMETRY 1: CPT

## 2021-10-10 PROCEDURE — 700102 HCHG RX REV CODE 250 W/ 637 OVERRIDE(OP): Performed by: STUDENT IN AN ORGANIZED HEALTH CARE EDUCATION/TRAINING PROGRAM

## 2021-10-10 PROCEDURE — A9270 NON-COVERED ITEM OR SERVICE: HCPCS | Performed by: STUDENT IN AN ORGANIZED HEALTH CARE EDUCATION/TRAINING PROGRAM

## 2021-10-10 PROCEDURE — 770020 HCHG ROOM/CARE - TELE (206)

## 2021-10-10 PROCEDURE — 85610 PROTHROMBIN TIME: CPT

## 2021-10-10 PROCEDURE — A9270 NON-COVERED ITEM OR SERVICE: HCPCS | Performed by: INTERNAL MEDICINE

## 2021-10-10 PROCEDURE — 700102 HCHG RX REV CODE 250 W/ 637 OVERRIDE(OP): Performed by: INTERNAL MEDICINE

## 2021-10-10 PROCEDURE — 700111 HCHG RX REV CODE 636 W/ 250 OVERRIDE (IP): Performed by: INTERNAL MEDICINE

## 2021-10-10 PROCEDURE — 94640 AIRWAY INHALATION TREATMENT: CPT

## 2021-10-10 PROCEDURE — 700102 HCHG RX REV CODE 250 W/ 637 OVERRIDE(OP): Performed by: HOSPITALIST

## 2021-10-10 PROCEDURE — 94660 CPAP INITIATION&MGMT: CPT

## 2021-10-10 PROCEDURE — 80053 COMPREHEN METABOLIC PANEL: CPT

## 2021-10-10 PROCEDURE — A9270 NON-COVERED ITEM OR SERVICE: HCPCS | Performed by: HOSPITALIST

## 2021-10-10 PROCEDURE — 700105 HCHG RX REV CODE 258: Performed by: INTERNAL MEDICINE

## 2021-10-10 PROCEDURE — 99231 SBSQ HOSP IP/OBS SF/LOW 25: CPT | Performed by: HOSPITALIST

## 2021-10-10 PROCEDURE — 36415 COLL VENOUS BLD VENIPUNCTURE: CPT

## 2021-10-10 RX ORDER — LOPERAMIDE HYDROCHLORIDE 2 MG/1
2 CAPSULE ORAL
Status: COMPLETED | OUTPATIENT
Start: 2021-10-10 | End: 2021-10-10

## 2021-10-10 RX ORDER — BUDESONIDE AND FORMOTEROL FUMARATE DIHYDRATE 160; 4.5 UG/1; UG/1
2 AEROSOL RESPIRATORY (INHALATION)
Status: DISCONTINUED | OUTPATIENT
Start: 2021-10-10 | End: 2021-10-13 | Stop reason: HOSPADM

## 2021-10-10 RX ADMIN — WARFARIN SODIUM 9 MG: 6 TABLET ORAL at 17:56

## 2021-10-10 RX ADMIN — ATORVASTATIN CALCIUM 20 MG: 20 TABLET, FILM COATED ORAL at 17:55

## 2021-10-10 RX ADMIN — TRAZODONE HYDROCHLORIDE 50 MG: 100 TABLET ORAL at 20:38

## 2021-10-10 RX ADMIN — DEXAMETHASONE 6 MG: 6 TABLET ORAL at 04:30

## 2021-10-10 RX ADMIN — NICOTINE 14 MG: 14 PATCH TRANSDERMAL at 04:31

## 2021-10-10 RX ADMIN — CEFTRIAXONE SODIUM 2 G: 2 INJECTION, POWDER, FOR SOLUTION INTRAMUSCULAR; INTRAVENOUS at 04:31

## 2021-10-10 RX ADMIN — MONTELUKAST 10 MG: 10 TABLET, FILM COATED ORAL at 20:38

## 2021-10-10 RX ADMIN — LOPERAMIDE HYDROCHLORIDE 2 MG: 2 CAPSULE ORAL at 01:00

## 2021-10-10 RX ADMIN — TIOTROPIUM BROMIDE INHALATION SPRAY 5 MCG: 3.12 SPRAY, METERED RESPIRATORY (INHALATION) at 07:26

## 2021-10-10 RX ADMIN — BUDESONIDE AND FORMOTEROL FUMARATE DIHYDRATE 2 PUFF: 160; 4.5 AEROSOL RESPIRATORY (INHALATION) at 07:26

## 2021-10-10 RX ADMIN — ARIPIPRAZOLE 30 MG: 15 TABLET ORAL at 04:30

## 2021-10-10 RX ADMIN — FLUTICASONE PROPIONATE 100 MCG: 50 SPRAY, METERED NASAL at 17:56

## 2021-10-10 RX ADMIN — OMEPRAZOLE 20 MG: 20 CAPSULE, DELAYED RELEASE ORAL at 04:30

## 2021-10-10 RX ADMIN — FLUTICASONE PROPIONATE 100 MCG: 50 SPRAY, METERED NASAL at 04:31

## 2021-10-10 ASSESSMENT — PAIN DESCRIPTION - PAIN TYPE: TYPE: ACUTE PAIN

## 2021-10-10 NOTE — PROGRESS NOTES
Inpatient Anticoagulation Service Note    Date: 10/10/2021    Reason for Anticoagulation: Atrial Fibrillation   Target INR: 2.0 to 3.0  [id number] VASc Score: 6  HAS-BLED Score: 1   Hemoglobin Value: (!) 10.9  Hematocrit Value: (!) 36.3  Lab Platelet Value: 199    INR from last 7 days     Date/Time INR Value    10/10/21 0146 1.46    10/09/21 02:42:01 1.6    10/08/21 0331 1.69    10/07/21 1041 1.45    10/06/21 1200 1.76        Dose from last 7 days     Date/Time Dose (mg)    10/10/21 0948 6    10/09/21 1309 6    10/08/21 1014 6    10/07/21 1225 6    10/06/21 2053 10        Average Dose (mg): 6  Significant Interactions: Statin  Bridge Therapy: No   Reversal Agent Administered: Not Applicable  Comments: Continued downtrend of INR, most likely resistant d/t BMI of 52. will preemptively increase warfarin to 9 mg    Plan:  Increase warfarin to 9 mg   Education Material Provided?: No  Pharmacist suggested discharge dosing: Potentially 9 mg two days per week and 6 mg the remainder of the week. Pending INR trend.      Chantale Hinson, HildaD  [id number]

## 2021-10-10 NOTE — PROGRESS NOTES
Daily Progress Note:     Date of Service: 10/10/2021  Primary Team: [facility] Team   Attending: Sariah Stout M.D.   Senior Resident: Dr. Roberto Carlos Finch  Intern: Dr. Robert Cyr  Contact:  [phone]    Chief Complaint:   Shortness of breath.    Subjective/interval update/Review of systems:  No acute events overnight.   -Patient report doing okay.   -She continues to have intermittent cough, with associated left sided chest wall pain.   - Medically cleared, pending placement    Objective Data:   Vitals:   Temp:  [36.1 °C (97 °F)-36.9 °C (98.4 °F)] 36.4 °C (97.6 °F)  Pulse:  [50-67] 55  Resp:  [18-20] 20  BP: (107-120)/(52-63) 119/60  SpO2:  [91 %-97 %] 91 %  Physical Exam:   Physical Exam  Constitutional:       General: She is not in acute distress.     Appearance: She is obese. She is not ill-appearing, toxic-appearing or diaphoretic.   HENT:      Head: Normocephalic and atraumatic.      Mouth/Throat:      Mouth: Mucous membranes are dry.   Eyes:      General: No scleral icterus.  Cardiovascular:      Rate and Rhythm: Normal rate. Rhythm irregular.      Pulses: Normal pulses.      Comments: Pedal pulse 2 + bilateral foot  Pulmonary:      Effort: Pulmonary effort is normal.      Breath sounds: No stridor. Wheezing present. No rales.   Chest:      Chest wall: Tenderness present.   Abdominal:      General: Bowel sounds are normal. There is no distension.      Palpations: Abdomen is soft.      Tenderness: There is no abdominal tenderness. There is no guarding.   Musculoskeletal:      Cervical back: Normal range of motion and neck supple. No rigidity.      Right lower leg: No edema.      Left lower leg: No edema.   Skin:     General: Skin is warm and dry.      Comments: Chronic stasis dermatitis in bilateral lower shin painful to palpation   Neurological:      Mental Status: She is alert and oriented to person, place, and time. Mental status is at baseline.           Labs:   Renal function stable BUN/Cr  22/1.01  INR 1.460      Imaging:   Chest x-ray:  Some cardiomegaly.  Airspace opacity in the right middle lung.    Assessment and plan    46-year-old female, COVID vaccinated, current smoker (30 X 2/7 pack years) with PMHx of COPD on 2 L home oxygen (PFT s 9/21, mixed obstructive/restrictive pattern, ratio 86), obesity hypoventilation syndrome(BMI 53), KATHIA(was not using CPAP) and paroxysmal A. Fib (on Coumadin) presented with shortness of breath and cough productive of yellow sputum found to be Covid positive with lobar pneumonia on x-ray currently on dexamethasone, and C3 & azithromycin for possible bacterial pneumonia.  Blood cultures NTD.    ##Lobar pneumonia on imaging  ##COVID-19 vaccinated  ##COVID-19 positive  - admitted on with productive cough and increase oxygen demand thought to be due to community acquired pneumonia s/p C3 x5 days and azithromycin  - David-CoV-2 PCR NP swab was positive on admission, dexamethasone 6mg was started as precaution ( EOT 10/16/2021)  - blood cultures NTD.  Plan  - medically clear, pending discharge  - nocturnal cpap  - ICS  - On Symbicort, albuterol and tiotropium inhalers.  Continuing montelukast.  Continuing Flonase.      ##Chronic hypoxemic respiratory failure on 2L via NC   ##COPD   ##obstructive sleep apnea   ##obesity hypoventilation syndrome    -Patient has a history of COPD, obesity hypoventilation syndrome, obstructive sleep apnea.  PFTs 9/2021 -pulmonology assessment reported a mixed obstructive and restrictive pattern.  -Patient was very somnolent  10/7 morning, ABGs ordered that showed pH of 7.43, with PCO2 52.2, PO2 57.3, bicarb 34, oxygen saturation 89.8 with base excess of 8.  -This looks like a mixed respiratory acidosis/metabolic alkalosis picture-continuing CPAP/BiPAP at night.    Plan  - On Symbicort, albuterol and tiotropium inhalers.  Continuing montelukast.  Continuing Flonase.  - Nocturnal CPAP.  - goal spO2 88-92%  - encourage ICS  - keep head of bed >  30%    ## Atrial fibrillation on warfarin  - CHADVASC 6  - warfarin dosing per pharmacy    ##MONI   ##Gastritis  -Gastritis sec to steroid use- on omeprazole 20mg for gastric protection.  -BUN, Cr increasing with GFR decreased to 56 (10/9)-1000 cc NS bolus due to pre renal MONI sec to dehydration, decreased oral intake and insen water loss.  Risk of overflow incontinence-bladder scan 10/9, will f/u and add oxybutynin if required.    ##Bipolar disorder type II  ##Schizophrenia  Patient has a history of bipolar disorder and schizophrenia with multiple ED visits in the last months.  Continuing home medications, aripiprazole.    ##Smoking history  On nicotine replacement inpatient.    ##Prediabetes  HbA1c increased at 6.3.  Began insulin sliding scale in hospital in anticipation of increased blood glucose levels due to steroids.Sliding scale discontinued due to no insulin requirements oct-9.    DVT prophylaxis: Continuing Coumadin

## 2021-10-10 NOTE — PROGRESS NOTES
Telemetry monitoring: SR with heart rate between 57 - 68 bpm. Occasional PVCs and PACs.    0.13/0.08/0.37

## 2021-10-11 ENCOUNTER — PATIENT OUTREACH (OUTPATIENT)
Dept: HEALTH INFORMATION MANAGEMENT | Facility: OTHER | Age: 46
End: 2021-10-11

## 2021-10-11 LAB
ALBUMIN SERPL BCP-MCNC: 3.6 G/DL (ref 3.2–4.9)
ALBUMIN/GLOB SERPL: 1.2 G/DL
ALP SERPL-CCNC: 93 U/L (ref 30–99)
ALT SERPL-CCNC: 12 U/L (ref 2–50)
ANION GAP SERPL CALC-SCNC: 7 MMOL/L (ref 7–16)
AST SERPL-CCNC: 15 U/L (ref 12–45)
BILIRUB SERPL-MCNC: 0.4 MG/DL (ref 0.1–1.5)
BUN SERPL-MCNC: 19 MG/DL (ref 8–22)
CALCIUM SERPL-MCNC: 8.8 MG/DL (ref 8.5–10.5)
CHLORIDE SERPL-SCNC: 103 MMOL/L (ref 96–112)
CO2 SERPL-SCNC: 31 MMOL/L (ref 20–33)
CREAT SERPL-MCNC: 0.84 MG/DL (ref 0.5–1.4)
EKG IMPRESSION: NORMAL
GLOBULIN SER CALC-MCNC: 3 G/DL (ref 1.9–3.5)
GLUCOSE SERPL-MCNC: 90 MG/DL (ref 65–99)
INR PPP: 1.5 (ref 0.87–1.13)
POTASSIUM SERPL-SCNC: 4.5 MMOL/L (ref 3.6–5.5)
PROT SERPL-MCNC: 6.6 G/DL (ref 6–8.2)
PROTHROMBIN TIME: 17.7 SEC (ref 12–14.6)
SODIUM SERPL-SCNC: 141 MMOL/L (ref 135–145)

## 2021-10-11 PROCEDURE — 94640 AIRWAY INHALATION TREATMENT: CPT

## 2021-10-11 PROCEDURE — 93010 ELECTROCARDIOGRAM REPORT: CPT | Performed by: INTERNAL MEDICINE

## 2021-10-11 PROCEDURE — 93005 ELECTROCARDIOGRAM TRACING: CPT | Performed by: HOSPITALIST

## 2021-10-11 PROCEDURE — 700102 HCHG RX REV CODE 250 W/ 637 OVERRIDE(OP): Performed by: HOSPITALIST

## 2021-10-11 PROCEDURE — 36415 COLL VENOUS BLD VENIPUNCTURE: CPT

## 2021-10-11 PROCEDURE — 700102 HCHG RX REV CODE 250 W/ 637 OVERRIDE(OP): Performed by: STUDENT IN AN ORGANIZED HEALTH CARE EDUCATION/TRAINING PROGRAM

## 2021-10-11 PROCEDURE — 51798 US URINE CAPACITY MEASURE: CPT

## 2021-10-11 PROCEDURE — 700102 HCHG RX REV CODE 250 W/ 637 OVERRIDE(OP): Performed by: INTERNAL MEDICINE

## 2021-10-11 PROCEDURE — A9270 NON-COVERED ITEM OR SERVICE: HCPCS | Performed by: STUDENT IN AN ORGANIZED HEALTH CARE EDUCATION/TRAINING PROGRAM

## 2021-10-11 PROCEDURE — 85610 PROTHROMBIN TIME: CPT

## 2021-10-11 PROCEDURE — 700101 HCHG RX REV CODE 250: Performed by: INTERNAL MEDICINE

## 2021-10-11 PROCEDURE — 80053 COMPREHEN METABOLIC PANEL: CPT

## 2021-10-11 PROCEDURE — 94660 CPAP INITIATION&MGMT: CPT

## 2021-10-11 PROCEDURE — 770020 HCHG ROOM/CARE - TELE (206)

## 2021-10-11 PROCEDURE — A9270 NON-COVERED ITEM OR SERVICE: HCPCS | Performed by: HOSPITALIST

## 2021-10-11 PROCEDURE — A9270 NON-COVERED ITEM OR SERVICE: HCPCS | Performed by: INTERNAL MEDICINE

## 2021-10-11 PROCEDURE — 99232 SBSQ HOSP IP/OBS MODERATE 35: CPT | Performed by: HOSPITALIST

## 2021-10-11 RX ORDER — IPRATROPIUM BROMIDE AND ALBUTEROL SULFATE 2.5; .5 MG/3ML; MG/3ML
3 SOLUTION RESPIRATORY (INHALATION) 3 TIMES DAILY
Status: DISCONTINUED | OUTPATIENT
Start: 2021-10-11 | End: 2021-10-13

## 2021-10-11 RX ADMIN — TIOTROPIUM BROMIDE INHALATION SPRAY 5 MCG: 3.12 SPRAY, METERED RESPIRATORY (INHALATION) at 09:37

## 2021-10-11 RX ADMIN — FLUTICASONE PROPIONATE 100 MCG: 50 SPRAY, METERED NASAL at 04:56

## 2021-10-11 RX ADMIN — TRAZODONE HYDROCHLORIDE 50 MG: 100 TABLET ORAL at 20:32

## 2021-10-11 RX ADMIN — WARFARIN SODIUM 9 MG: 6 TABLET ORAL at 18:55

## 2021-10-11 RX ADMIN — FLUTICASONE PROPIONATE 100 MCG: 50 SPRAY, METERED NASAL at 18:54

## 2021-10-11 RX ADMIN — OMEPRAZOLE 20 MG: 20 CAPSULE, DELAYED RELEASE ORAL at 04:56

## 2021-10-11 RX ADMIN — IPRATROPIUM BROMIDE AND ALBUTEROL SULFATE 3 ML: .5; 2.5 SOLUTION RESPIRATORY (INHALATION) at 22:04

## 2021-10-11 RX ADMIN — BUDESONIDE AND FORMOTEROL FUMARATE DIHYDRATE 2 PUFF: 160; 4.5 AEROSOL RESPIRATORY (INHALATION) at 09:37

## 2021-10-11 RX ADMIN — NICOTINE 14 MG: 14 PATCH TRANSDERMAL at 04:56

## 2021-10-11 RX ADMIN — BUDESONIDE AND FORMOTEROL FUMARATE DIHYDRATE 2 PUFF: 160; 4.5 AEROSOL RESPIRATORY (INHALATION) at 20:32

## 2021-10-11 RX ADMIN — ARIPIPRAZOLE 30 MG: 15 TABLET ORAL at 04:56

## 2021-10-11 RX ADMIN — ATORVASTATIN CALCIUM 20 MG: 20 TABLET, FILM COATED ORAL at 18:54

## 2021-10-11 RX ADMIN — MONTELUKAST 10 MG: 10 TABLET, FILM COATED ORAL at 20:32

## 2021-10-11 RX ADMIN — IPRATROPIUM BROMIDE AND ALBUTEROL SULFATE 3 ML: .5; 2.5 SOLUTION RESPIRATORY (INHALATION) at 15:37

## 2021-10-11 RX ADMIN — DEXAMETHASONE 6 MG: 6 TABLET ORAL at 04:56

## 2021-10-11 NOTE — PROGRESS NOTES
Daily Progress Note:     Date of Service: 10/11/2021  Primary Team: [facility] Team   Attending: Sariah Stout M.D.   Senior Resident: Dr. Roberto Carlos Finch  Intern: Dr. Robert Cyr  Contact:  [phone]    Chief Complaint:   Shortness of breath.    Subjective/interval update/Review of systems:  Patient was complaining of chest heaviness last night, monitor summary showed 21 beats of SVT, EKG= sinus jude with rate of 53.  This morning, the patient was laying down comfortably in bed and describes radha her breathing is much better now.  Also says that she needs to go to the bathroom more frequently during the day with little urine at times.  Post void bladder scan showed 194 mL.    Objective Data:   Vitals:   Temp:  [36.1 °C (97 °F)-37.2 °C (99 °F)] 36.6 °C (97.9 °F)  Pulse:  [52-75] 75  Resp:  [18-20] 19  BP: (107-124)/(43-60) 115/60  SpO2:  [92 %-96 %] 94 %  Physical Exam:   Physical Exam  Constitutional:       General: She is not in acute distress.     Appearance: She is obese. She is not ill-appearing, toxic-appearing or diaphoretic.   HENT:      Head: Normocephalic and atraumatic.      Mouth/Throat:      Mouth: Mucous membranes are dry.   Eyes:      General: No scleral icterus.        Right eye: No discharge.         Left eye: No discharge.   Cardiovascular:      Rate and Rhythm: Normal rate. Rhythm irregular.      Pulses: Normal pulses.      Comments: Pedal pulse 2 + bilateral foot  Pulmonary:      Breath sounds: Wheezing present.      Comments: Decreased breath sounds bilaterally on both the lungs.  Abdominal:      General: Bowel sounds are normal. There is no distension.      Palpations: Abdomen is soft.      Tenderness: There is no abdominal tenderness. There is no guarding.   Musculoskeletal:      Cervical back: Normal range of motion and neck supple. No rigidity.      Right lower leg: No edema.      Left lower leg: No edema.   Skin:     General: Skin is warm and dry.      Coloration: Skin is not  jaundiced.      Comments: Chronic status dermatitis rash in bilateral lower extremities, painful to touch/palpation.   Neurological:      Mental Status: She is alert and oriented to person, place, and time. Mental status is at baseline.   Psychiatric:         Mood and Affect: Mood normal.           Labs:   BUN 19  Creatinine 0.84  GFR > 60      Imaging:   No new imaging.    Assessment and plan    46-year-old female, COVID vaccinated, current smoker (30 X 2/7 pack years) with PMHx of COPD on 2 L home oxygen (PFT s 9/21, mixed obstructive/restrictive pattern, ratio 86), obesity hypoventilation syndrome(BMI 53), KATHIA(was not using CPAP) and paroxysmal A. Fib (on Coumadin) presented with shortness of breath and cough productive of yellow sputum found to be Covid positive with lobar pneumonia on x-ray currently on dexamethasone, and C3 & azithromycin for possible bacterial pneumonia.  Blood cultures NTD.    ##Lobar pneumonia on imaging  ##Covid vaccinated ##Covid positive  ##Chronic hypoxemic respiratory failure on 2 L via NC   ##COPD  ##Obstructive sleep apnea  ##Obesity hypoventilation syndrome  Admitted for productive cough and increased oxygen demand, thought to be community-acquired pneumonia s/p C3 X 5 days and azithromycin X 3 days.  COVID PCR was positive on admission.  Blood cultures NTD.  Plan:  On dexamethasone 6 mg X 10 days, ending on   Scheduled duo nebs 3 times daily, Symbicort twice daily, continuing montelukast and Flonase.  Encouraging incentive spirometry and proning  Nocturnal CPAP    ## Atrial fibrillation on warfarin  - CHADVASC 6  - warfarin dosing per pharmacy    ##MONI   ##Gastritis  -Gastritis sec to steroid use- on omeprazole 20mg for gastric protection.  -BUN, Cr increasing with GFR decreased to 56 (10/9)-1000 cc NS bolus due to pre renal MONI sec to dehydration, decreased oral intake and insen water loss.  Risk of overflow incontinence-bladder scan 10/9, will f/u and add oxybutynin if  required.    ##Bipolar disorder type II  ##Schizophrenia  Patient has a history of bipolar disorder and schizophrenia with multiple ED visits in the last months.  Continuing home medications, aripiprazole.    ##Smoking history  On nicotine replacement inpatient.    ##Prediabetes  HbA1c increased at 6.3.  Began insulin sliding scale in hospital in anticipation of increased blood glucose levels due to steroids.Sliding scale discontinued due to no insulin requirements oct-9.    DVT prophylaxis: Continuing Coumadin

## 2021-10-11 NOTE — PROGRESS NOTES
Pt up to chair for meal and then pt assisted back to bed after meal completed. Pt c/o generalized weakness. Call light and personal belongings within reach of pt.

## 2021-10-11 NOTE — PROGRESS NOTES
CHW Vinson called the patient and left a voicemail re-introducing community care management. Patient is in enhanced isolation.     CHW will attempt again.

## 2021-10-11 NOTE — PROGRESS NOTES
Inpatient Anticoagulation Service Note    Date: 10/11/2021    Reason for Anticoagulation: Atrial Fibrillation   Target INR: 2.0 to 3.0  [id number] VASc Score: 6  HAS-BLED Score: 1   Hemoglobin Value: (!) 10.9  Hematocrit Value: (!) 36.3  Lab Platelet Value: 199    INR from last 7 days     Date/Time INR Value    10/11/21 0252 1.5    10/10/21 0146 1.46    10/09/21 02:42:01 1.6    10/08/21 0331 1.69    10/07/21 1041 1.45    10/06/21 1200 1.76        Dose from last 7 days     Date/Time Dose (mg)    10/11/21 1337 9    10/10/21 0948 6    10/09/21 1309 6    10/08/21 1014 6    10/07/21 1225 6    10/06/21 2053 10        Average Dose (mg): 7  Significant Interactions: Statin  Bridge Therapy: No   Reversal Agent Administered: Not Applicable  Comments: Continue with increased dosage of 9 mg.  If INR continues to be <2 tomorrow, will suggest heparin 5000 q8h  Education Material Provided?: No  Pharmacist suggested discharge dosing: TBD - but if discharges, would suggest 9 mg two days per week and 6 mg remainder of week.      Chantale Hinson, PharmD  [id number]

## 2021-10-11 NOTE — PROGRESS NOTES
RN was notified by monitor room that pt had 21 beats of SVT. Patient complained of chest heaviness, MD Leighton AMBRIZ was notified. EKG was ordered per protocol. Rn was instructed to monitor pt for additional symptoms. Will continue to monitor.

## 2021-10-11 NOTE — PROGRESS NOTES
Monitor Summary   SB-SR 52-89  R-F PVC with trigeminy and couplets  21 beats PSVT  .15/.07/.37

## 2021-10-12 LAB
BACTERIA BLD CULT: NORMAL
BACTERIA BLD CULT: NORMAL
INR PPP: 1.74 (ref 0.87–1.13)
PROTHROMBIN TIME: 19.8 SEC (ref 12–14.6)
SIGNIFICANT IND 70042: NORMAL
SIGNIFICANT IND 70042: NORMAL
SITE SITE: NORMAL
SITE SITE: NORMAL
SOURCE SOURCE: NORMAL
SOURCE SOURCE: NORMAL

## 2021-10-12 PROCEDURE — 94760 N-INVAS EAR/PLS OXIMETRY 1: CPT

## 2021-10-12 PROCEDURE — 94640 AIRWAY INHALATION TREATMENT: CPT

## 2021-10-12 PROCEDURE — 94660 CPAP INITIATION&MGMT: CPT

## 2021-10-12 PROCEDURE — A9270 NON-COVERED ITEM OR SERVICE: HCPCS | Performed by: INTERNAL MEDICINE

## 2021-10-12 PROCEDURE — 97535 SELF CARE MNGMENT TRAINING: CPT

## 2021-10-12 PROCEDURE — 700102 HCHG RX REV CODE 250 W/ 637 OVERRIDE(OP): Performed by: INTERNAL MEDICINE

## 2021-10-12 PROCEDURE — 700101 HCHG RX REV CODE 250: Performed by: INTERNAL MEDICINE

## 2021-10-12 PROCEDURE — 99232 SBSQ HOSP IP/OBS MODERATE 35: CPT | Performed by: HOSPITALIST

## 2021-10-12 PROCEDURE — 97535 SELF CARE MNGMENT TRAINING: CPT | Mod: CQ

## 2021-10-12 PROCEDURE — 700102 HCHG RX REV CODE 250 W/ 637 OVERRIDE(OP): Performed by: STUDENT IN AN ORGANIZED HEALTH CARE EDUCATION/TRAINING PROGRAM

## 2021-10-12 PROCEDURE — A9270 NON-COVERED ITEM OR SERVICE: HCPCS | Performed by: STUDENT IN AN ORGANIZED HEALTH CARE EDUCATION/TRAINING PROGRAM

## 2021-10-12 PROCEDURE — 97116 GAIT TRAINING THERAPY: CPT | Mod: CQ

## 2021-10-12 PROCEDURE — 97530 THERAPEUTIC ACTIVITIES: CPT | Mod: CQ

## 2021-10-12 PROCEDURE — 700102 HCHG RX REV CODE 250 W/ 637 OVERRIDE(OP): Performed by: HOSPITALIST

## 2021-10-12 PROCEDURE — A9270 NON-COVERED ITEM OR SERVICE: HCPCS | Performed by: HOSPITALIST

## 2021-10-12 PROCEDURE — 36415 COLL VENOUS BLD VENIPUNCTURE: CPT

## 2021-10-12 PROCEDURE — 770020 HCHG ROOM/CARE - TELE (206)

## 2021-10-12 PROCEDURE — 85610 PROTHROMBIN TIME: CPT

## 2021-10-12 RX ORDER — GUAIFENESIN 600 MG/1
600 TABLET, EXTENDED RELEASE ORAL 2 TIMES DAILY
Status: DISCONTINUED | OUTPATIENT
Start: 2021-10-12 | End: 2021-10-13 | Stop reason: HOSPADM

## 2021-10-12 RX ADMIN — WARFARIN SODIUM 9 MG: 6 TABLET ORAL at 17:22

## 2021-10-12 RX ADMIN — MONTELUKAST 10 MG: 10 TABLET, FILM COATED ORAL at 20:54

## 2021-10-12 RX ADMIN — BUDESONIDE AND FORMOTEROL FUMARATE DIHYDRATE 2 PUFF: 160; 4.5 AEROSOL RESPIRATORY (INHALATION) at 08:19

## 2021-10-12 RX ADMIN — TRAZODONE HYDROCHLORIDE 50 MG: 100 TABLET ORAL at 20:54

## 2021-10-12 RX ADMIN — ATORVASTATIN CALCIUM 20 MG: 20 TABLET, FILM COATED ORAL at 17:22

## 2021-10-12 RX ADMIN — ARIPIPRAZOLE 30 MG: 15 TABLET ORAL at 04:01

## 2021-10-12 RX ADMIN — IPRATROPIUM BROMIDE AND ALBUTEROL SULFATE 3 ML: .5; 2.5 SOLUTION RESPIRATORY (INHALATION) at 11:24

## 2021-10-12 RX ADMIN — NICOTINE 14 MG: 14 PATCH TRANSDERMAL at 04:02

## 2021-10-12 RX ADMIN — BUDESONIDE AND FORMOTEROL FUMARATE DIHYDRATE 2 PUFF: 160; 4.5 AEROSOL RESPIRATORY (INHALATION) at 20:50

## 2021-10-12 RX ADMIN — ACETAMINOPHEN 650 MG: 325 TABLET ORAL at 17:20

## 2021-10-12 RX ADMIN — DEXAMETHASONE 6 MG: 6 TABLET ORAL at 04:01

## 2021-10-12 RX ADMIN — OMEPRAZOLE 20 MG: 20 CAPSULE, DELAYED RELEASE ORAL at 04:01

## 2021-10-12 RX ADMIN — FLUTICASONE PROPIONATE 100 MCG: 50 SPRAY, METERED NASAL at 04:02

## 2021-10-12 RX ADMIN — IPRATROPIUM BROMIDE AND ALBUTEROL SULFATE 3 ML: .5; 2.5 SOLUTION RESPIRATORY (INHALATION) at 15:26

## 2021-10-12 RX ADMIN — TIOTROPIUM BROMIDE INHALATION SPRAY 5 MCG: 3.12 SPRAY, METERED RESPIRATORY (INHALATION) at 11:23

## 2021-10-12 RX ADMIN — GUAIFENESIN 600 MG: 600 TABLET, EXTENDED RELEASE ORAL at 10:55

## 2021-10-12 RX ADMIN — FLUTICASONE PROPIONATE 100 MCG: 50 SPRAY, METERED NASAL at 17:19

## 2021-10-12 RX ADMIN — GUAIFENESIN 600 MG: 600 TABLET, EXTENDED RELEASE ORAL at 17:20

## 2021-10-12 RX ADMIN — IPRATROPIUM BROMIDE AND ALBUTEROL SULFATE 3 ML: .5; 2.5 SOLUTION RESPIRATORY (INHALATION) at 20:50

## 2021-10-12 ASSESSMENT — COGNITIVE AND FUNCTIONAL STATUS - GENERAL
SUGGESTED CMS G CODE MODIFIER DAILY ACTIVITY: CK
MOVING TO AND FROM BED TO CHAIR: A LITTLE
STANDING UP FROM CHAIR USING ARMS: A LITTLE
HELP NEEDED FOR BATHING: A LITTLE
DAILY ACTIVITIY SCORE: 19
DRESSING REGULAR LOWER BODY CLOTHING: A LOT
TOILETING: A LITTLE
TURNING FROM BACK TO SIDE WHILE IN FLAT BAD: A LITTLE
CLIMB 3 TO 5 STEPS WITH RAILING: A LOT
MOVING FROM LYING ON BACK TO SITTING ON SIDE OF FLAT BED: A LITTLE
DRESSING REGULAR UPPER BODY CLOTHING: A LITTLE

## 2021-10-12 ASSESSMENT — GAIT ASSESSMENTS
DISTANCE (FEET): 45
ASSISTIVE DEVICE: 4 WHEEL WALKER
GAIT LEVEL OF ASSIST: SUPERVISED

## 2021-10-12 ASSESSMENT — PAIN DESCRIPTION - PAIN TYPE: TYPE: ACUTE PAIN

## 2021-10-12 NOTE — PROGRESS NOTES
Daily Progress Note:     Date of Service: 10/12/2021  Primary Team: [facility] Purple Team   Attending: Sariah Stout M.D.   Senior Resident: Dr. Roberto Carlos Finch  Intern: Dr. Robert Cyr  Contact:  [phone]    Chief Complaint:   Shortness of breath.    Subjective/interval update/Review of systems:  Patient's monitor summary showed she had 13 beats SVT, that spontaneously converted to SR last night.  She has had SVTs previously as well, monitor summary this morning again showed around 12 beats SVT.  Patient describes that she is not feeling good today and it seems to her like she is having a fever.  Complains of productive cough with some occasional blood and nausea.  Denies any chest pain or palpitations.    Objective Data:   Vitals:   Temp:  [36.2 °C (97.2 °F)-37.1 °C (98.8 °F)] 36.3 °C (97.3 °F)  Pulse:  [50-81] 65  Resp:  [19-20] 20  BP: (112-124)/(40-59) 119/40  SpO2:  [88 %-98 %] 91 %     Physical Exam:   Patient is obese.  Mucous membranes moist.  CVS: Normal rate, rhythm is regular  Radial pulses low-volume bilaterally.  Decreased breath sounds on bilateral lung fields, however, better than yesterday.  Wheezes audible on bilateral lung fields.  Some dullness to percussion on the abdomen.  Stasis dermatitis rash present on the bilateral lower extremities with very mild edema, tender to palpation.    Labs:   No new labs      Imaging:   No new imaging    Assessment and plan    46-year-old female, COVID vaccinated, current smoker (30 X 2/7 pack years) with PMHx of COPD on 2 L home oxygen (PFT s 9/21, mixed obstructive/restrictive pattern, ratio 86), obesity hypoventilation syndrome(BMI 53), KATHIA(was not using CPAP) and paroxysmal A. Fib (on Coumadin) presented with shortness of breath and cough productive of yellow sputum found to be Covid positive with lobar pneumonia on x-ray currently on dexamethasone, and C3 & azithromycin for possible bacterial pneumonia.  Blood cultures NTD.    ##Lobar pneumonia on  imaging  ##Covid vaccinated ##Covid positive  ##Chronic hypoxemic respiratory failure on 2 L via NC   ##COPD  ##Obstructive sleep apnea  ##Obesity hypoventilation syndrome  Admitted for productive cough and increased oxygen demand, thought to be community-acquired pneumonia s/p C3 X 5 days and azithromycin X 3 days.  COVID PCR was positive on admission.  Blood cultures NTD.  Plan:  On dexamethasone 6 mg X 10 days, ending on 10/16.  Scheduled duo nebs 3 times daily, Symbicort twice daily, continuing montelukast and Flonase.  Encouraging incentive spirometry and proning  Nocturnal CPAP    ##SVT  Patient's monitor summary has been showing episodes of SVT with around 12 beats each time.  She denies any symptoms during that time.  Could be secondary to obesity, chronic lung disease, KATHIA and anxiety.  Ordering CMP and mag for tomorrow to evaluate any electrolyte abnormalities contributing to SVT.    ## Atrial fibrillation on warfarin  - CHADVASC 6  - warfarin dosing per pharmacy    ##MONI   ##Gastritis  -Gastritis sec to steroid use- on omeprazole 20mg for gastric protection.  -BUN, Cr increasing with GFR decreased to 56 (10/9)-1000 cc NS bolus due to pre renal MONI sec to dehydration, decreased oral intake and insen water loss.  Risk of overflow incontinence-bladder scan 10/9, will f/u and add oxybutynin if required.    ##Bipolar disorder type II  ##Schizophrenia  Patient has a history of bipolar disorder and schizophrenia with multiple ED visits in the last months.  Continuing home medications, aripiprazole.    ##Smoking history  On nicotine replacement inpatient.    ##Prediabetes  HbA1c increased at 6.3.  Began insulin sliding scale in hospital in anticipation of increased blood glucose levels due to steroids.Sliding scale discontinued due to no insulin requirements oct-9.    DVT prophylaxis: Continuing Coumadin

## 2021-10-12 NOTE — PROGRESS NOTES
Inpatient Anticoagulation Service Note    Date: 10/12/2021    Reason for Anticoagulation: Atrial Fibrillation   Target INR: 2.0 to 3.0  [id number] VASc Score: 6  HAS-BLED Score: 1   Hemoglobin Value: (!) 10.9  Hematocrit Value: (!) 36.3  Lab Platelet Value: 199    INR from last 7 days     Date/Time INR Value    10/12/21 0747 1.74    10/11/21 0252 1.5    10/10/21 0146 1.46    10/09/21 02:42:01 1.6    10/08/21 0331 1.69    10/07/21 1041 1.45    10/06/21 1200 1.76        Dose from last 7 days     Date/Time Dose (mg)    10/12/21 1016 9    10/11/21 1337 9    10/10/21 0948 6    10/09/21 1309 6    10/08/21 1014 6    10/07/21 1225 6    10/06/21 2053 10        Average Dose (mg): 7  Significant Interactions: Statin  Bridge Therapy: No   Reversal Agent Administered: Not Applicable  Comments: Would resume at least prophylactic heparin 5000 q8h given CHADSVASc of 6 and still subtherapeutic INR  Plan:  Continue with 9 mg, INR is increasing   Education Material Provided?: No  Pharmacist suggested discharge dosin mg three times weekly and 6 mg the remainder of the week.      Chantale Hinson, PharmD  [id number]

## 2021-10-12 NOTE — PROGRESS NOTES
Monitors update with 13 bts SVT, converted back to SR. Pt has had previous bouts of SVT. Update given to [facility] on call MD Manzanares.      0150: Pt with12 beats SVT, sleeping, back to SR.

## 2021-10-12 NOTE — DISCHARGE PLANNING
Anticipated Discharge Disposition: TBD    Action: LSW spoke to Dr. Gan regarding patient. Patient needing SNF per therapy recs. Patient covid + on 10/6/21. Patient cannot go to SNF until at least at 10-14 days. Patient on 2.5 L O2. LSW requested updated PT/OT notes.     Barriers to Discharge: covid + and needing SNF    Plan: LSW to f/u for update recommendations

## 2021-10-12 NOTE — PROGRESS NOTES
COVID surge in effect. Report received from RN. Pt observed resting. POC discussed if necessary and all questions answered. Bed locked and in low position, with call light and appropriate belongings within reach. All needs and requirements met at this time.

## 2021-10-12 NOTE — THERAPY
Occupational Therapy  Daily Treatment     Patient Name: Lorene Haro  Age:  46 y.o., Sex:  female  Medical Record #: [id number]  Today's Date: 10/12/2021     Precautions: (P) Fall Risk  Comments: (P) COVID+    Assessment    Pt progressing well with therapy today. She maintained O2 sats above 90% on 4L O2 via NC. Pt tolerated toileting with min A, standing G/H supervision for 10 min, and functional txfs (supervision from chair, min A from toilet). Based on functional progress, I anticipate pt is safe to DC home with HHOT and care giving assist when medically cleared. Will continue to follow for skilled OT for progression with activity tolerance and self cares.     Plan    Continue current treatment plan.    DC Equipment Recommendations: (P) None  Discharge Recommendations: (P) Recommend home health for continued occupational therapy services (with assist from caregivers for IADLs and occasional ADLs)       10/12/21 9768   Precautions   Precautions Fall Risk   Comments COVID+   Cognition    Cognition / Consciousness X   Comments appears to be at baseline cognitive functioning. Agreeable to therapy, easily frustrated but redirectable   Balance   Sitting Balance (Static) Fair +   Sitting Balance (Dynamic) Fair +   Standing Balance (Static) Fair   Standing Balance (Dynamic) Fair -   Weight Shift Sitting Good   Weight Shift Standing Fair   Skilled Intervention Verbal Cuing   Comments with 4WW, somewhat aggitated with navigating O2 line   Bed Mobility    Comments up in chair pre/post session   Activities of Daily Living   Grooming Supervision;Standing   Upper Body Dressing Supervision   Lower Body Dressing Supervision  (shoes)   Toileting Minimal Assist  (wiping in standing)   Skilled Intervention Verbal Cuing;Tactile Cuing;Compensatory Strategies   How much help from another person does the patient currently need...   Putting on and taking off regular lower body clothing? 2   Bathing (including washing, rinsing, and  drying)? 3   Toileting, which includes using a toilet, bedpan, or urinal? 3   Putting on and taking off regular upper body clothing? 3   Taking care of personal grooming such as brushing teeth? 4   Eating meals? 4   6 Clicks Daily Activity Score 19   Functional Mobility   Sit to Stand Supervised   Bed, Chair, Wheelchair Transfer Supervised   Toilet Transfers Minimal Assist  (from low toilet)   Transfer Method Stand Step   Mobility chair>bathroom>sink>back to bed   Skilled Intervention Verbal Cuing;Tactile Cuing;Sequencing   Comments with 4WW; verbal cues for safe toilet txf   Activity Tolerance   Sitting in Chair 30+ min   Standing 10 min   Patient / Family Goals   Patient / Family Goal #1 to go home    Short Term Goals   Short Term Goal # 1 Pt will stand at the sink to groom with supervision    Goal Outcome # 1 Goal met   Short Term Goal # 2 Pt will complete LB dressing with supervision    Goal Outcome # 2 Progressing as expected   Short Term Goal # 3 Pt will complete toilet transfers with supervision    Goal Outcome # 3 Progressing as expected   Education Group   Education Provided Pathology of bedrest;Activities of Daily Living   Role of Occupational Therapist Patient Response Patient;Acceptance;Explanation;Reinforcement Needed   ADL Patient Response Patient;Acceptance;Explanation   Pathology of Bedrest Patient Response Patient;Acceptance;Explanation;Action Demonstration;Reinforcement Needed   Anticipated Discharge Equipment and Recommendations   DC Equipment Recommendations None   Discharge Recommendations Recommend home health for continued occupational therapy services  (with assist from caregivers for IADLs)   Interdisciplinary Plan of Care Collaboration   IDT Collaboration with  Nursing   Patient Position at End of Therapy Seated;Call Light within Reach;Tray Table within Reach;Phone within Reach   Collaboration Comments OT tx and recs   Session Information   Date / Session Number  10/12 2 (2/2, 10/13)    Priority 2

## 2021-10-12 NOTE — THERAPY
"Physical Therapy   Daily Treatment     Patient Name: Lorene Haro  Age:  46 y.o., Sex:  female  Medical Record #: [id number]  Today's Date: 10/12/2021     Precautions  Precautions: (P) Fall Risk  Comments: (P) covid +    Assessment  Pt reluctant to participate due to being cold but educated on benefits of mobility. She reports she is feeling better and did improve with all mobility today. She continues to present with some self limiting behaviors with baby voice stating \" I can't do this\" when pt clearly is performing activity without assist. She completed all transfers with spv and 4ww as well ambulated 45ft in room with 4ww and spv. Slow steady pace and no lob. Pt agreeing she could go further if need be. She did confirm having caregiver throughout the day that assist and she ambulates household distances at this time. Continued to educate on importance of mobility and pursed lip breathing focusing on endurance while here in hospital. Discussed with primary PT pt seems close to functional baseline and recommend dc home with home health and continues caregiver assist.     Plan    Continue current treatment plan.    DC Equipment Recommendations: (P) None  Discharge Recommendations: (P) Recommend home health for continued physical therapy services         10/12/21 4279   Other Treatments   Other Treatments Provided extensive discussion regarding home set up, plof. extra time to complete tasks    Balance   Sitting Balance (Static) Fair +   Sitting Balance (Dynamic) Fair +   Standing Balance (Static) Fair   Standing Balance (Dynamic) Fair -   Weight Shift Sitting Good   Weight Shift Standing Fair   Comments with 4ww,    Gait Analysis   Gait Level Of Assist Supervised   Assistive Device 4 Wheel Walker   Distance (Feet) 45   # of Times Distance was Traveled 1   Skilled Intervention Verbal Cuing   Comments slow steady pace, pt reporting ambulates in household, slow steady pace no lob    Bed Mobility    Supine to Sit " Supervised   Sit to Supine Supervised   Comments hob flat use of bed rail only, extra time, pt with baby voice stating I can't do yet continued to move without assist    Functional Mobility   Sit to Stand Supervised   Bed, Chair, Wheelchair Transfer Supervised   Comments with 4ww good carryover of proper safety with brakes    Short Term Goals    Short Term Goal # 1 pt will be able to perform supine<>sit with HOB flat/no rails with Spv in 6tx to promote fnx progression towards I    Goal Outcome # 1 Progressing as expected   Short Term Goal # 2 pt will be able to perform sit<>stand/transfers with 4WW with Spv in 6tx to promote fnx progression towards I    Goal Outcome # 2 Goal met   Short Term Goal # 3 pt will be able to ambulate 75ft with 4WW with Spv in 6tx to promote fnx progression towards I    Goal Outcome # 3 Progressing as expected

## 2021-10-13 ENCOUNTER — PHARMACY VISIT (OUTPATIENT)
Dept: PHARMACY | Facility: MEDICAL CENTER | Age: 46
End: 2021-10-13
Payer: COMMERCIAL

## 2021-10-13 ENCOUNTER — PATIENT OUTREACH (OUTPATIENT)
Dept: HEALTH INFORMATION MANAGEMENT | Facility: OTHER | Age: 46
End: 2021-10-13

## 2021-10-13 VITALS
BODY MASS INDEX: 52.49 KG/M2 | SYSTOLIC BLOOD PRESSURE: 124 MMHG | WEIGHT: 278 LBS | TEMPERATURE: 97.2 F | HEART RATE: 112 BPM | DIASTOLIC BLOOD PRESSURE: 58 MMHG | HEIGHT: 61 IN | OXYGEN SATURATION: 94 % | RESPIRATION RATE: 18 BRPM

## 2021-10-13 LAB
ALBUMIN SERPL BCP-MCNC: 3.6 G/DL (ref 3.2–4.9)
ALBUMIN/GLOB SERPL: 1.2 G/DL
ALP SERPL-CCNC: 84 U/L (ref 30–99)
ALT SERPL-CCNC: 18 U/L (ref 2–50)
ANION GAP SERPL CALC-SCNC: 10 MMOL/L (ref 7–16)
AST SERPL-CCNC: 20 U/L (ref 12–45)
BASOPHILS # BLD AUTO: 0 % (ref 0–1.8)
BASOPHILS # BLD: 0 K/UL (ref 0–0.12)
BILIRUB SERPL-MCNC: 0.6 MG/DL (ref 0.1–1.5)
BUN SERPL-MCNC: 18 MG/DL (ref 8–22)
CALCIUM SERPL-MCNC: 9.1 MG/DL (ref 8.5–10.5)
CHLORIDE SERPL-SCNC: 103 MMOL/L (ref 96–112)
CO2 SERPL-SCNC: 27 MMOL/L (ref 20–33)
CREAT SERPL-MCNC: 0.71 MG/DL (ref 0.5–1.4)
EOSINOPHIL # BLD AUTO: 0 K/UL (ref 0–0.51)
EOSINOPHIL NFR BLD: 0 % (ref 0–6.9)
ERYTHROCYTE [DISTWIDTH] IN BLOOD BY AUTOMATED COUNT: 50.2 FL (ref 35.9–50)
GLOBULIN SER CALC-MCNC: 3.1 G/DL (ref 1.9–3.5)
GLUCOSE SERPL-MCNC: 118 MG/DL (ref 65–99)
HCT VFR BLD AUTO: 35 % (ref 37–47)
HGB BLD-MCNC: 10.5 G/DL (ref 12–16)
IMM GRANULOCYTES # BLD AUTO: 0.02 K/UL (ref 0–0.11)
IMM GRANULOCYTES NFR BLD AUTO: 0.6 % (ref 0–0.9)
INR PPP: 1.79 (ref 0.87–1.13)
LYMPHOCYTES # BLD AUTO: 0.37 K/UL (ref 1–4.8)
LYMPHOCYTES NFR BLD: 11.1 % (ref 22–41)
MAGNESIUM SERPL-MCNC: 2.2 MG/DL (ref 1.5–2.5)
MCH RBC QN AUTO: 25.8 PG (ref 27–33)
MCHC RBC AUTO-ENTMCNC: 30 G/DL (ref 33.6–35)
MCV RBC AUTO: 86 FL (ref 81.4–97.8)
MONOCYTES # BLD AUTO: 0.14 K/UL (ref 0–0.85)
MONOCYTES NFR BLD AUTO: 4.2 % (ref 0–13.4)
NEUTROPHILS # BLD AUTO: 2.81 K/UL (ref 2–7.15)
NEUTROPHILS NFR BLD: 84.1 % (ref 44–72)
NRBC # BLD AUTO: 0 K/UL
NRBC BLD-RTO: 0 /100 WBC
PHOSPHATE SERPL-MCNC: 2.6 MG/DL (ref 2.5–4.5)
PLATELET # BLD AUTO: 151 K/UL (ref 164–446)
PMV BLD AUTO: 12.6 FL (ref 9–12.9)
POTASSIUM SERPL-SCNC: 4.5 MMOL/L (ref 3.6–5.5)
PROT SERPL-MCNC: 6.7 G/DL (ref 6–8.2)
PROTHROMBIN TIME: 20.3 SEC (ref 12–14.6)
RBC # BLD AUTO: 4.07 M/UL (ref 4.2–5.4)
SODIUM SERPL-SCNC: 140 MMOL/L (ref 135–145)
WBC # BLD AUTO: 3.3 K/UL (ref 4.8–10.8)

## 2021-10-13 PROCEDURE — 80053 COMPREHEN METABOLIC PANEL: CPT

## 2021-10-13 PROCEDURE — 700102 HCHG RX REV CODE 250 W/ 637 OVERRIDE(OP): Performed by: HOSPITALIST

## 2021-10-13 PROCEDURE — 84100 ASSAY OF PHOSPHORUS: CPT

## 2021-10-13 PROCEDURE — A9270 NON-COVERED ITEM OR SERVICE: HCPCS | Performed by: STUDENT IN AN ORGANIZED HEALTH CARE EDUCATION/TRAINING PROGRAM

## 2021-10-13 PROCEDURE — 97530 THERAPEUTIC ACTIVITIES: CPT | Mod: CQ

## 2021-10-13 PROCEDURE — 700102 HCHG RX REV CODE 250 W/ 637 OVERRIDE(OP): Performed by: STUDENT IN AN ORGANIZED HEALTH CARE EDUCATION/TRAINING PROGRAM

## 2021-10-13 PROCEDURE — 94640 AIRWAY INHALATION TREATMENT: CPT

## 2021-10-13 PROCEDURE — 97535 SELF CARE MNGMENT TRAINING: CPT | Mod: CQ

## 2021-10-13 PROCEDURE — 97116 GAIT TRAINING THERAPY: CPT | Mod: CQ

## 2021-10-13 PROCEDURE — RXMED WILLOW AMBULATORY MEDICATION CHARGE: Performed by: STUDENT IN AN ORGANIZED HEALTH CARE EDUCATION/TRAINING PROGRAM

## 2021-10-13 PROCEDURE — 36415 COLL VENOUS BLD VENIPUNCTURE: CPT

## 2021-10-13 PROCEDURE — 700102 HCHG RX REV CODE 250 W/ 637 OVERRIDE(OP): Performed by: INTERNAL MEDICINE

## 2021-10-13 PROCEDURE — 99238 HOSP IP/OBS DSCHRG MGMT 30/<: CPT | Mod: GC | Performed by: HOSPITALIST

## 2021-10-13 PROCEDURE — 85610 PROTHROMBIN TIME: CPT

## 2021-10-13 PROCEDURE — 83735 ASSAY OF MAGNESIUM: CPT

## 2021-10-13 PROCEDURE — 94660 CPAP INITIATION&MGMT: CPT

## 2021-10-13 PROCEDURE — A9270 NON-COVERED ITEM OR SERVICE: HCPCS | Performed by: INTERNAL MEDICINE

## 2021-10-13 PROCEDURE — A9270 NON-COVERED ITEM OR SERVICE: HCPCS | Performed by: HOSPITALIST

## 2021-10-13 PROCEDURE — 85025 COMPLETE CBC W/AUTO DIFF WBC: CPT

## 2021-10-13 RX ORDER — BUDESONIDE AND FORMOTEROL FUMARATE DIHYDRATE 160; 4.5 UG/1; UG/1
2 AEROSOL RESPIRATORY (INHALATION) 2 TIMES DAILY
Qty: 10.2 G | Refills: 0 | Status: SHIPPED | OUTPATIENT
Start: 2021-10-13 | End: 2021-12-16 | Stop reason: SDUPTHER

## 2021-10-13 RX ORDER — NICOTINE 21 MG/24HR
1 PATCH, TRANSDERMAL 24 HOURS TRANSDERMAL EVERY 24 HOURS
Qty: 30 PATCH | Refills: 0 | Status: SHIPPED | OUTPATIENT
Start: 2021-10-13 | End: 2022-02-26

## 2021-10-13 RX ORDER — ALBUTEROL SULFATE 90 UG/1
2 AEROSOL, METERED RESPIRATORY (INHALATION)
Status: DISCONTINUED | OUTPATIENT
Start: 2021-10-13 | End: 2021-10-13 | Stop reason: HOSPADM

## 2021-10-13 RX ORDER — ALBUTEROL SULFATE 90 UG/1
2 AEROSOL, METERED RESPIRATORY (INHALATION) 3 TIMES DAILY
Status: DISCONTINUED | OUTPATIENT
Start: 2021-10-13 | End: 2021-10-13 | Stop reason: HOSPADM

## 2021-10-13 RX ORDER — DEXAMETHASONE 6 MG/1
6 TABLET ORAL DAILY
Qty: 5 TABLET | Refills: 0 | Status: SHIPPED | OUTPATIENT
Start: 2021-10-14 | End: 2021-10-19

## 2021-10-13 RX ORDER — ALBUTEROL SULFATE 90 UG/1
2 AEROSOL, METERED RESPIRATORY (INHALATION)
Status: DISCONTINUED | OUTPATIENT
Start: 2021-10-13 | End: 2021-10-13

## 2021-10-13 RX ADMIN — GUAIFENESIN 600 MG: 600 TABLET, EXTENDED RELEASE ORAL at 16:31

## 2021-10-13 RX ADMIN — ALBUTEROL SULFATE 2 PUFF: 90 AEROSOL, METERED RESPIRATORY (INHALATION) at 14:31

## 2021-10-13 RX ADMIN — ACETAMINOPHEN 650 MG: 325 TABLET ORAL at 13:29

## 2021-10-13 RX ADMIN — ARIPIPRAZOLE 30 MG: 15 TABLET ORAL at 05:49

## 2021-10-13 RX ADMIN — DEXAMETHASONE 6 MG: 6 TABLET ORAL at 05:51

## 2021-10-13 RX ADMIN — ALBUTEROL SULFATE 2 PUFF: 90 AEROSOL, METERED RESPIRATORY (INHALATION) at 18:00

## 2021-10-13 RX ADMIN — FLUTICASONE PROPIONATE 100 MCG: 50 SPRAY, METERED NASAL at 05:51

## 2021-10-13 RX ADMIN — NICOTINE 14 MG: 14 PATCH TRANSDERMAL at 05:51

## 2021-10-13 RX ADMIN — ALBUTEROL SULFATE 2 PUFF: 90 AEROSOL, METERED RESPIRATORY (INHALATION) at 08:05

## 2021-10-13 RX ADMIN — WARFARIN SODIUM 9 MG: 6 TABLET ORAL at 16:32

## 2021-10-13 RX ADMIN — GUAIFENESIN 600 MG: 600 TABLET, EXTENDED RELEASE ORAL at 05:51

## 2021-10-13 RX ADMIN — OMEPRAZOLE 20 MG: 20 CAPSULE, DELAYED RELEASE ORAL at 05:51

## 2021-10-13 RX ADMIN — ATORVASTATIN CALCIUM 20 MG: 20 TABLET, FILM COATED ORAL at 16:31

## 2021-10-13 RX ADMIN — FLUTICASONE PROPIONATE 100 MCG: 50 SPRAY, METERED NASAL at 16:32

## 2021-10-13 RX ADMIN — TIOTROPIUM BROMIDE INHALATION SPRAY 5 MCG: 3.12 SPRAY, METERED RESPIRATORY (INHALATION) at 08:04

## 2021-10-13 RX ADMIN — BUDESONIDE AND FORMOTEROL FUMARATE DIHYDRATE 2 PUFF: 160; 4.5 AEROSOL RESPIRATORY (INHALATION) at 08:04

## 2021-10-13 ASSESSMENT — COGNITIVE AND FUNCTIONAL STATUS - GENERAL
WALKING IN HOSPITAL ROOM: A LOT
MOVING TO AND FROM BED TO CHAIR: A LITTLE
CLIMB 3 TO 5 STEPS WITH RAILING: A LOT
MOBILITY SCORE: 16
STANDING UP FROM CHAIR USING ARMS: A LITTLE
MOVING FROM LYING ON BACK TO SITTING ON SIDE OF FLAT BED: A LITTLE
SUGGESTED CMS G CODE MODIFIER MOBILITY: CK
TURNING FROM BACK TO SIDE WHILE IN FLAT BAD: A LITTLE

## 2021-10-13 ASSESSMENT — GAIT ASSESSMENTS
ASSISTIVE DEVICE: 4 WHEEL WALKER
DISTANCE (FEET): 60
GAIT LEVEL OF ASSIST: SUPERVISED

## 2021-10-13 ASSESSMENT — PAIN DESCRIPTION - PAIN TYPE: TYPE: ACUTE PAIN

## 2021-10-13 NOTE — DISCHARGE PLANNING
@2720  Agency/Facility Name: [facility]  Spoke To: Ciro  Outcome: Accepted and will have a tank delivered.    @1612  Agency/Facility Name: [facility]  Outcome: Sent new order.

## 2021-10-13 NOTE — PROGRESS NOTES
Covid 19 crisis charting in effect    0700 Assumed care of pt. Pt remains on 2.5 - 3 L NC in daytime, cpap with 6L at HS. Remains on tele monitor with no events reported overnight. Pt needs encouragement to increase mobility. DC plan SNF but will be 10 days post positive covid test vs home with home health.     0930 O2 challenge completed  Pt is on 3 L base line at home  3L resting 92%  3L ambulating 86%  3L recovery 90%  5L ambulating 88%  6L Ambulating 91%      1218 Left message with scheduling to have appointments set up at the anticoagulation clinic for INR checks      1745 Dc instructions, med list, follow up appointments provided. Meds to bed and oxygen tank delivered to bedside. All lines and monitors dc'd. Transportation scheduled for 1800 to transport pt home. Pt verbalized understanding.     1830 pt transported by wheel chair with all belongings to medical transport van.

## 2021-10-13 NOTE — PROGRESS NOTES
Report received fromHalle RN.  Patient sitting up in bed.  POC gone over with the pt and all questions answered.  Patient A & O  X 4.  No apparent signs of distress.  Safety precautions in place.  Patient educated to call for assistance.  Hourly rounding in place.  COVID 19 Surge in effect.

## 2021-10-13 NOTE — DISCHARGE INSTRUCTIONS
Discharge Instructions    Discharged to home by car with relative. Discharged via wheelchair, hospital escort: Yes.  Special equipment needed: Oxygen    Insurance does not cover the cost of a pulse oximeter. You can purchase one at your local pharmacy     Oxygen at 3 liters nasal canula while awake, may increase to 6 liters with ambulation. 6 liters by cpap at bedtime    Be sure to schedule a follow-up appointment with your primary care doctor or any specialists as instructed.     Discharge Plan:   Diet Plan: Discussed  Activity Level: Discussed  Smoking Cessation Offered: Patient Refused  Confirmed Follow up Appointment: Patient to Call and Schedule Appointment  Confirmed Symptoms Management: Discussed  Medication Reconciliation Updated: Yes    I understand that a diet low in cholesterol, fat, and sodium is recommended for good health. Unless I have been given specific instructions below for another diet, I accept this instruction as my diet prescription.   Other diet: Cardiac    Special Instructions: None    · Is patient discharged on Warfarin / Coumadin?   Yes    You are receiving the drug warfarin. Please understand the importance of monitoring warfarin with scheduled PT/INR blood draws.  Follow-up with a call to your personal Doctor's office in 3 days to schedule a PT/INR. .    IMPORTANT: HOW TO USE THIS INFORMATION:  This is a summary and does NOT have all possible information about this product. This information does not assure that this product is safe, effective, or appropriate for you. This information is not individual medical advice and does not substitute for the advice of your health care professional. Always ask your health care professional for complete information about this product and your specific health needs.      WARFARIN - ORAL (WARF-uh-rin)      COMMON BRAND NAME(S): Coumadin      WARNING:  Warfarin can cause very serious (possibly fatal) bleeding. This is more likely to occur when you first  "start taking this medication or if you take too much warfarin. To decrease your risk for bleeding, your doctor or other health care provider will monitor you closely and check your lab results (INR test) to make sure you are not taking too much warfarin. Keep all medical and laboratory appointments. Tell your doctor right away if you notice any signs of serious bleeding. See also Side Effects section.      USES:  This medication is used to treat blood clots (such as in deep vein thrombosis-DVT or pulmonary embolus-PE) and/or to prevent new clots from forming in your body. Preventing harmful blood clots helps to reduce the risk of a stroke or heart attack. Conditions that increase your risk of developing blood clots include a certain type of irregular heart rhythm (atrial fibrillation), heart valve replacement, recent heart attack, and certain surgeries (such as hip/knee replacement). Warfarin is commonly called a \"blood thinner,\" but the more correct term is \"anticoagulant.\" It helps to keep blood flowing smoothly in your body by decreasing the amount of certain substances (clotting proteins) in your blood.      HOW TO USE:  Read the Medication Guide provided by your pharmacist before you start taking warfarin and each time you get a refill. If you have any questions, ask your doctor or pharmacist. Take this medication by mouth with or without food as directed by your doctor or other health care professional, usually once a day. It is very important to take it exactly as directed. Do not increase the dose, take it more frequently, or stop using it unless directed by your doctor. Dosage is based on your medical condition, laboratory tests (such as INR), and response to treatment. Your doctor or other health care provider will monitor you closely while you are taking this medication to determine the right dose for you. Use this medication regularly to get the most benefit from it. To help you remember, take it at the " same time each day. It is important to eat a balanced, consistent diet while taking warfarin. Some foods can affect how warfarin works in your body and may affect your treatment and dose. Avoid sudden large increases or decreases in your intake of foods high in vitamin K (such as broccoli, cauliflower, cabbage, brussels sprouts, kale, spinach, and other green leafy vegetables, liver, green tea, certain vitamin supplements). If you are trying to lose weight, check with your doctor before you try to go on a diet. Cranberry products may also affect how your warfarin works. Limit the amount of cranberry juice (16 ounces/480 milliliters a day) or other cranberry products you may drink or eat.      SIDE EFFECTS:  Nausea, loss of appetite, or stomach/abdominal pain may occur. If any of these effects persist or worsen, tell your doctor or pharmacist promptly. Remember that your doctor has prescribed this medication because he or she has judged that the benefit to you is greater than the risk of side effects. Many people using this medication do not have serious side effects. This medication can cause serious bleeding if it affects your blood clotting proteins too much (shown by unusually high INR lab results). Even if your doctor stops your medication, this risk of bleeding can continue for up to a week. Tell your doctor right away if you have any signs of serious bleeding, including: unusual pain/swelling/discomfort, unusual/easy bruising, prolonged bleeding from cuts or gums, persistent/frequent nosebleeds, unusually heavy/prolonged menstrual flow, pink/dark urine, coughing up blood, vomit that is bloody or looks like coffee grounds, severe headache, dizziness/fainting, unusual or persistent tiredness/weakness, bloody/black/tarry stools, chest pain, shortness of breath, difficulty swallowing. Tell your doctor right away if any of these unlikely but serious side effects occur: persistent nausea/vomiting, severe  stomach/abdominal pain, yellowing eyes/skin. This drug rarely has caused very serious (possibly fatal) problems if its effects lead to small blood clots (usually at the beginning of treatment). This can lead to severe skin/tissue damage that may require surgery or amputation if left untreated. Patients with certain blood conditions (protein C or S deficiency) may be at greater risk. Get medical help right away if any of these rare but serious side effects occur: painful/red/purplish patches on the skin (such as on the toe, breast, abdomen), change in the amount of urine, vision changes, confusion, slurred speech, weakness on one side of the body. A very serious allergic reaction to this drug is rare. However, get medical help right away if you notice any symptoms of a serious allergic reaction, including: rash, itching/swelling (especially of the face/tongue/throat), severe dizziness, trouble breathing. This is not a complete list of possible side effects. If you notice other effects not listed above, contact your doctor or pharmacist. In the [place] - Call your doctor for medical advice about side effects. You may report side effects to FDA at 4-347-ZFS-6903. In [place] - Call your doctor for medical advice about side effects. You may report side effects to [facility] [place] at 1-912.365.4822.      PRECAUTIONS:  Before taking warfarin, tell your doctor or pharmacist if you are allergic to it; or if you have any other allergies. This product may contain inactive ingredients, which can cause allergic reactions or other problems. Talk to your pharmacist for more details. Before using this medication, tell your doctor or pharmacist your medical history, especially of: blood disorders (such as anemia, hemophilia), bleeding problems (such as bleeding of the stomach/intestines, bleeding in the brain), blood vessel disorders (such as aneurysms), recent major injury/surgery, liver disease, alcohol use, mental/mood disorders  (including memory problems), frequent falls/injuries. It is important that all your doctors and dentists know that you take warfarin. Before having surgery or any medical/dental procedures, tell your doctor or dentist that you are taking this medication and about all the products you use (including prescription drugs, nonprescription drugs, and herbal products). Avoid getting injections into the muscles. If you must have an injection into a muscle (for example, a flu shot), it should be given in the arm. This way, it will be easier to check for bleeding and/or apply pressure bandages. This medication may cause stomach bleeding. Daily use of alcohol while using this medicine will increase your risk for stomach bleeding and may also affect how this medication works. Limit or avoid alcoholic beverages. If you have not been eating well, if you have an illness or infection that causes fever, vomiting, or diarrhea for more than 2 days, or if you start using any antibiotic medications, contact your doctor or pharmacist immediately because these conditions can affect how warfarin works. This medication can cause heavy bleeding. To lower the chance of getting cut, bruised, or injured, use great caution with sharp objects like safety razors and nail cutters. Use an electric razor when shaving and a soft toothbrush when brushing your teeth. Avoid activities such as contact sports. If you fall or injure yourself, especially if you hit your head, call your doctor immediately. Your doctor may need to check you. The Food & Drug Administration has stated that generic warfarin products are interchangeable. However, consult your doctor or pharmacist before switching warfarin products. Be careful not to take more than one medication that contains warfarin unless specifically directed by the doctor or health care provider who is monitoring your warfarin treatment. Older adults may be at greater risk for bleeding while using this drug.  "This medication is not recommended for use during pregnancy because of serious (possibly fatal) harm to an unborn baby. Discuss the use of reliable forms of birth control with your doctor. If you become pregnant or think you may be pregnant, tell your doctor immediately. If you are planning pregnancy, discuss a plan for managing your condition with your doctor before you become pregnant. Your doctor may switch the type of medication you use during pregnancy. Very small amounts of this medication may pass into breast milk but is unlikely to harm a nursing infant. Consult your doctor before breast-feeding.      DRUG INTERACTIONS:  Drug interactions may change how your medications work or increase your risk for serious side effects. This document does not contain all possible drug interactions. Keep a list of all the products you use (including prescription/nonprescription drugs and herbal products) and share it with your doctor and pharmacist. Do not start, stop, or change the dosage of any medicines without your doctor's approval. Warfarin interacts with many prescription, nonprescription, vitamin, and herbal products. This includes medications that are applied to the skin or inside the vagina or rectum. The interactions with warfarin usually result in an increase or decrease in the \"blood-thinning\" (anticoagulant) effect. Your doctor or other health care professional should closely monitor you to prevent serious bleeding or clotting problems. While taking warfarin, it is very important to tell your doctor or pharmacist of any changes in medications, vitamins, or herbal products that you are taking. Some products that may interact with this drug include: capecitabine, imatinib, mifepristone. Aspirin, aspirin-like drugs (salicylates), and nonsteroidal anti-inflammatory drugs (NSAIDs such as ibuprofen, naproxen, celecoxib) may have effects similar to warfarin. These drugs may increase the risk of bleeding problems if " taken during treatment with warfarin. Carefully check all prescription/nonprescription product labels (including drugs applied to the skin such as pain-relieving creams) since the products may contain NSAIDs or salicylates. Talk to your doctor about using a different medication (such as acetaminophen) to treat pain/fever. Low-dose aspirin and related drugs (such as clopidogrel, ticlopidine) should be continued if prescribed by your doctor for specific medical reasons such as heart attack or stroke prevention. Consult your doctor or pharmacist for more details. Many herbal products interact with warfarin. Tell your doctor before taking any herbal products, especially bromelains, coenzyme Q10, cranberry, danshen, dong quai, fenugreek, garlic, ginkgo biloba, ginseng, and Zahra's wort, among others. This medication may interfere with a certain laboratory test to measure theophylline levels, possibly causing false test results. Make sure laboratory personnel and all your doctors know you use this drug.      OVERDOSE:  If overdose is suspected, contact a poison control center or emergency room immediately. [place] residents can call the [place] National Poison Hotline at 1-434.405.6678. [place] residents can call a provincial poison control center. Symptoms of overdose may include: bloody/black/tarry stools, pink/dark urine, unusual/prolonged bleeding.      NOTES:  Do not share this medication with others. Laboratory and/or medical tests (such as INR, complete blood count) must be performed periodically to monitor your progress or check for side effects. Consult your doctor for more details.      MISSED DOSE:  For the best possible benefit, do not miss any doses. If you do miss a dose and remember on the same day, take it as soon as you remember. If you remember on the next day, skip the missed dose and resume your usual dosing schedule. Do not double the dose to catch up because this could increase your risk for bleeding. Keep a  record of missed doses to give to your doctor or pharmacist. Contact your doctor or pharmacist if you miss 2 or more doses in a row.      STORAGE:  Store at room temperature away from light and moisture. Do not store in the bathroom. Keep all medications away from children and pets. Do not flush medications down the toilet or pour them into a drain unless instructed to do so. Properly discard this product when it is  or no longer needed. Consult your pharmacist or local waste disposal company for more details about how to safely discard your product.      MEDICAL ALERT:  Your condition and medication can cause complications in a medical emergency. For information about enrolling in MedicAlert, call 1-245.337.2966 ([place]) or 1-520.358.9901 ([place]).      Information last revised 2010 Copyright(c)  [employer]             Depression / Suicide Risk    As you are discharged from this [facility] facility, it is important to learn how to keep safe from harming yourself.    Recognize the warning signs:  · Abrupt changes in personality, positive or negative- including increase in energy   · Giving away possessions  · Change in eating patterns- significant weight changes-  positive or negative  · Change in sleeping patterns- unable to sleep or sleeping all the time   · Unwillingness or inability to communicate  · Depression  · Unusual sadness, discouragement and loneliness  · Talk of wanting to die  · Neglect of personal appearance   · Rebelliousness- reckless behavior  · Withdrawal from people/activities they love  · Confusion- inability to concentrate     If you or a loved one observes any of these behaviors or has concerns about self-harm, here's what you can do:  · Talk about it- your feelings and reasons for harming yourself  · Remove any means that you might use to hurt yourself (examples: pills, rope, extension cords, firearm)  · Get professional help from the community (Mental Health,  Substance Abuse, psychological counseling)  · Do not be alone:Call your Safe Contact- someone whom you trust who will be there for you.  · Call your local CRISIS HOTLINE 752-5014 or 629-436-3685  · Call your local Children's Mobile Crisis Response Team [place] (221) 003-2044 or www.C[employer]Tangle  · Call the toll free National Suicide Prevention Hotlines   · National Suicide Prevention Lifeline 559-250-XIRO (4943)  · National Hope Line Network 800-SUICIDE (801-0908)        Infection Prevention in the Home  If you have an infection, may have been exposed to an infection, or are taking care of someone who has an infection, it is important to know how to keep the infection from spreading. Follow your health care provider's instructions and use these guidelines to help stop the spread of infection.  How infections are spread  In order for an infection to spread, the following must be present:  · A germ. This may be a virus, bacteria, fungus, or parasite.  · A place for the germ to live. This may be:  ? On or in a person, animal, plant, or food.  ? In soil or water.  ? On surfaces, such as a door handle.  · A person or animal who can develop a disease if the germ enters the body (host). The host does not have resistance to the germ.  · A way for the germ to enter the host. This may occur by:  ? Direct contact with an infected person or animal. This can happen through shaking hands or hugging. Some germs can also travel through the air and spread to others. This can happen when an infected person coughs or sneezes on or near other people.  ? Indirect contact. This occurs when the germ enters the host through contact with an infected object. Examples include:  § Eating or drinking food or water that has the germ (is contaminated).  § Touching a contaminated surface with your hands, and then touching your face, eyes, nose, or mouth.  Supplies needed:  · Soap.  · Alcohol-based hand .  · Standard cleaning  products.  · Disinfectants, such as bleach.  · Reusable cleaning cloths, sponges, or paper towels.  · Disposable or reusable utility gloves.  How to prevent infection from spreading  There are several things that you can do to help prevent infection from spreading.  Take these general actions  Everyone should take the following actions to prevent the spread of infection:  · Wash your hands often with soap and water for at least 20 seconds. If soap and water are not available, use alcohol-based hand .  · Avoid touching your face, mouth, nose, or eyes.  · Cough or sneeze into a tissue, sleeve, or elbow instead of into your hand or into the air.  ? If you cough or sneeze into a tissue, throw it away immediately and wash your hands.    Keep your bathroom clean  · Provide soap.  · Change towels and washcloths frequently.  · Change toothbrushes often and store them separately in a clean, dry place.  · Clean and disinfect all surfaces, including the toilet, floor, tub, shower, and sink.  · Do not share personal items, such as razors, toothbrushes, deodorant, love, brushes, towels, and washcloths.  Maintain hygiene in the kitchen    · Wash your hands before and after preparing food and before you eat.  · Clean the inside of your refrigerator each week.  · Keep your refrigerator set at 40°F (4°C) or less, and set your freezer at 0°F (-18°C) or less.  · Keep work surfaces clean. Disinfect them regularly.  · Wash your dishes in hot, soapy water. Air-dry your dishes or use a .  · Do not share dishes or eating utensils.  Handle food safely  · Store food carefully.  · Refrigerate leftovers promptly in covered containers.  · Throw out stale or spoiled food.  · Thaw foods in the refrigerator or microwave, not at room temperature.  · Serve foods at the proper temperature. Do not eat raw meat. Make sure it is cooked to the appropriate temperature. Cook eggs until they are firm.  · Wash fruits and vegetables under  running water.  · Use separate cutting boards, plates, and utensils for raw foods and cooked foods.  · Use a clean spoon each time you sample food while cooking.  Do laundry the right way  · Wear gloves if laundry is visibly soiled.  · Do not shake soiled laundry. Doing that may send germs into the air.  · Wash laundry in hot water.  · If you cannot wash the laundry right away, place it in a plastic bag and wash it as soon as possible.  Be careful around animals and pets  · Wash your hands before and after touching animals.  · If you have a pet, ensure that your pet stays clean. Do not let people with weak immune systems touch bird droppings, fish tank water, or a litter box.  ? If you have a pet cage or litter box, be sure to clean it every day.  · If you are sick, stay away from animals and have someone else care for them if possible.  How to clean and disinfect objects and surfaces  Precautions  · Some disinfectants work for certain germs and not others. Read the 's instructions or read online resources to determine if the product you are using will work for the germ you are trying to remove.  · If you choose to use bleach, use it safely. Never mix it with other cleaning products, especially those that contain ammonia. This mixture can create a dangerous gas that may be deadly.  · Keep proper movement of fresh air in your home (ventilation).  · Pour used mop water down the utility sink or toilet. Do not pour this water down the kitchen sink.  Objects and surfaces    · If surfaces are visibly soiled, clean them first with soap and water before disinfecting.  · Disinfect surfaces that are frequently touched every day. This may include:  ? Counters.  ? Tables.  ? Doorknobs.  ? Sinks and faucets.  ? Electronics, such as:  § Phones.  § Remote controls.  § Keyboards.  § Computers and tablets.  Cleaning supplies  Some cleaning supplies can breed germs. Take good care of them to prevent germs from spreading.  To do this:  · Soak toilet brushes, mops, and sponges in bleach and water for 5 minutes after each use, or according to 's instructions.  · Wash reusable cleaning cloths and sanitize sponges after each use.  · Throw away disposable gloves after one use.  · Replace reusable utility gloves if they are cracked or torn or if they start to peel.  Additional actions if you are sick  If you live with other people:    · Avoid close contact with those around you. Stay at least 3 ft (1 m) away from others, if possible.  · Use a separate bathroom, if possible.  · If possible, sleep in a separate bedroom or in a separate bed to prevent infecting other household members.  ? Change bedroom linens each week or whenever they are soiled.  · Have everyone in the household wash hands often with soap and water. If soap and water are not available, use alcohol-based hand .  In general:  · Stay home except to get medical care. Call ahead before visiting your health care provider.  · Ask others to get groceries and household supplies and to refill prescriptions for you.  · Avoid public areas. Try not to take public transportation.  · If you can, wear a mask if you need to go out of the house, or if you are in close contact with someone who is not sick.  · Avoid visitors until you have completely recovered, or until you have no signs and symptoms of infection.  · Avoid preparing food or providing care for others. If you must prepare food or provide care for others, wear a mask and wash your hands before and after doing these things.  Where to find more information  · Centers for Disease Control and Prevention: www.cdc.gov/nonpharmaceutical-interventions/index.html  · World Health Organization (WHO): www.who.int/infection-prevention/about/en/  · Association for Professionals in Infection Control and Epidemiology: professionals.site.apic.org/settings-of-care/non-healthcare-setting/home/  Summary  · It is important to  know how to keep the infection from spreading.  · Make sure everyone in your household washes their hands often with soap and water.  · Disinfect surfaces that are frequently touched every day.  · If you are sick, stay home except to get medical care.  This information is not intended to replace advice given to you by your health care provider. Make sure you discuss any questions you have with your health care provider.  Document Released: 09/26/2009 Document Revised: 04/14/2020 Document Reviewed: 03/13/2020  [employer] Patient Education © 2020 [employer]

## 2021-10-13 NOTE — THERAPY
Physical Therapy   Daily Treatment     Patient Name: Lorene Haro  Age:  46 y.o., Sex:  female  Medical Record #: [id number]  Today's Date: 10/13/2021     Precautions  Precautions: Fall Risk  Comments: covid +    Assessment    Pt was pleasant continues to to present with some self limiting behaviors but than proceeds to complete task with extra time. She reach eOB with hob flat stating she can't do it but than completed with SPV. She was able to increase ambulation with 4ww and spv. 60ft with 4ww, one standing rest break and educated on energy conservation and pacing. Discussed even with caregivers at home due to diagnosis of covid risks of immobility once at home. She was more receptive today and even agreed to sit up in chair. Anticipate can return home due to caregiver assist. Will follow while in here.     Plan    Continue current treatment plan.    DC Equipment Recommendations: None  Discharge Recommendations: Recommend home health for continued physical therapy services         10/13/21 1524   Other Treatments   Other Treatments Provided provided with exercises for seated now she is agreeable to be up in recliner    Balance   Sitting Balance (Static) Fair +   Sitting Balance (Dynamic) Fair +   Standing Balance (Static) Fair   Standing Balance (Dynamic) Fair -   Weight Shift Sitting Good   Weight Shift Standing Fair   Comments with 4ww   Gait Analysis   Gait Level Of Assist Supervised   Assistive Device 4 Wheel Walker   Distance (Feet) 60   # of Times Distance was Traveled 1   Skilled Intervention Verbal Cuing;Sequencing   Comments improved overal balance and tolerance. Standing rest breaks during gait to focus on breathing. Vc to slow darshan to conserve energy    Bed Mobility    Supine to Sit Supervised   Sit to Supine   (left up in chair)   Scooting Supervised   Rolling Supervised   Skilled Intervention Verbal Cuing;Sequencing   Comments hob flat, bed rail. Pt stating I can't do it but than proceeded to  perform with spv and extended time    Functional Mobility   Sit to Stand Supervised   Bed, Chair, Wheelchair Transfer Supervised   Skilled Intervention Verbal Cuing;Tactile Cuing   Comments vc for locking brakes on 4ww otherwise spv to complete    Short Term Goals    Short Term Goal # 1 pt will be able to perform supine<>sit with HOB flat/no rails with Spv in 6tx to promote fnx progression towards I    Goal Outcome # 1 Progressing as expected   Short Term Goal # 2 pt will be able to perform sit<>stand/transfers with 4WW with Spv in 6tx to promote fnx progression towards I    Goal Outcome # 2 Goal met   Short Term Goal # 3 pt will be able to ambulate 75ft with 4WW with Spv in 6tx to promote fnx progression towards I    Goal Outcome # 3 Progressing as expected

## 2021-10-13 NOTE — FACE TO FACE
"Face to Face Note  -  Durable Medical Equipment    Roberto Carlos Gan M.D. - NPI: [id number]  I certify that this patient is under my care and that they had a durable medical equipment(DME)face to face encounter by myself that meets the physician DME face-to-face encounter requirements with this patient on:    Date of encounter:   Patient:                    MRN:                       YOB: 2023  Lorene Haro  [id number]  1975     The encounter with the patient was in whole, or in part, for the following medical condition, which is the primary reason for durable medical equipment:  COPD, Asthma and Covid-19 Infection    I certify that, based on my findings, the following durable medical equipment is medically necessary:  Oxygen.    HOME O2 Saturation Measurements:(Values must be present for Home Oxygen orders)  Room air sat at rest: 85  Room air sat with amb: 82  With liters of O2: 3, O2 sat at rest with O2: 92  With Liters of O2: 6, O2 sat with amb with O2 : 91  Is the patient mobile?: Yes    My Clinical findings support the need for the above equipment due to:  Hypoxia, Wheezing (Chronic)    Supporting Symptoms: The patient requires supplemental oxygen, as the following interventions have been tried with limited or no improvement: \"Bronchodilators and/or steroid inhalers, \"Oral and/or IV steroids, \"Ambulation with oximetry and \"Incentive spirometry    If patient feels more short of breath, they can go up to 6 liters per minute and contact healthcare provider.  "

## 2021-10-13 NOTE — DISCHARGE PLANNING
*ATTN Discharge Planning team: This patient is currently on service with [facility]. Please submit a referral order and face-to-face prior to discharging the patient home. If you have any questions, contact our Transitional Care Specialist team at x [phone].

## 2021-10-13 NOTE — DISCHARGE PLANNING
Anticipated Discharge Disposition: Home with O2    Action: LSW notified by bedside RN that walking O2 complete. Patient on service with [facility]. LSW called [employer] who reports patient's baseline is 2L. Updated orders needed. [employer] will deliver tank for patient to discharge on.    LSW requested orders from MD. Dr. Gan to put orders in.    Barriers to Discharge: O2 orders, O2 delivery to bedside.     Plan: LSW to send O2 referral once orders are received.     1430:  [facility] to deliver O2 between [id number]. Patient needing assistance with transport home. Patient with active [facility] benefits. LSW requested ride home from [employer] for 1800. LSW spoke to patient over the phone and confirmed home address.    1530: Transport confirmed for 1800 via [facility] transport by [facility]. Bedside RN aware.

## 2021-10-13 NOTE — DISCHARGE PLANNING
Meds-to-Beds: Discharge prescription orders listed below delivered to patient's bedside JC Salvador. Unable to reach patient via phone. Written information regarding the dispensed prescriptions was provided to the patient including the phone number of the pharmacy to call for any questions.    Current Outpatient Medications   Medication Sig Dispense Refill   • budesonide-formoterol (SYMBICORT) 160-4.5 MCG/ACT Aerosol Inhale 2 Puffs 2 times a day. 10.2 g 0   • [START ON 10/14/2021] dexamethasone (DECADRON) 6 MG Tab Take 1 Tablet by mouth every day for 5 days. 5 Tablet 0   • nicotine (NICODERM) 14 MG/24HR PATCH 24 HR Place 1 Patch on the skin every 24 hours. 30 Patch 0      Juanita Spann, PharmD

## 2021-10-13 NOTE — FLOWSHEET NOTE
10/13/21 0805   Events/Summary/Plan   Events/Summary/Plan SVN switched to MDI per protocol. Patient COVID positive.

## 2021-10-13 NOTE — RESPIRATORY CARE
REMOTE MONITORING PROGRAM by RESPIRATORY THERAPY  10/13/2021 at 11:10 AM by Stephanie Mobley     Patient interviewed by RT. Patient does not qualify for Remote. Pt stated that she does not have a smart phone.  No questions at this time.

## 2021-10-13 NOTE — DISCHARGE PLANNING
Received Transport Form @ 143  Spoke to Tika @ 1443    Transport is scheduled for 10/13/2021 @1800 going to home.    [facility] Trip #: [id number]      Informed care team of transport via [employer].

## 2021-10-14 ENCOUNTER — HOME CARE VISIT (OUTPATIENT)
Dept: HOME HEALTH SERVICES | Facility: HOME HEALTHCARE | Age: 46
End: 2021-10-14
Payer: MEDICAID

## 2021-10-14 VITALS
SYSTOLIC BLOOD PRESSURE: 90 MMHG | OXYGEN SATURATION: 93 % | TEMPERATURE: 98 F | DIASTOLIC BLOOD PRESSURE: 60 MMHG | HEART RATE: 57 BPM | RESPIRATION RATE: 22 BRPM

## 2021-10-14 ASSESSMENT — ENCOUNTER SYMPTOMS
COUGH: 1
FATIGUE: 1
SHORTNESS OF BREATH: 1
MYALGIAS: 1
DENIES PAIN: 1

## 2021-10-14 NOTE — PROGRESS NOTES
ROSA MARIA Rojas will not follow this patient at this time due to discharge to home with home health.

## 2021-10-14 NOTE — FACE TO FACE
Face to Face Supporting Documentation - Home Health    The encounter with this patient was in whole or in part the primary reason for home health admission.    Date of encounter:   Patient:                    MRN:                       YOB: 2023  Lorene Haro  [id number]  1975     Home health to see patient for:  Home health aide, Physical Therapy evaluation and treatment and Occupational therapy evaluation and treatment    Skilled need for:  Medication Management COPD, chronic hypoxic and hypercarbic respiratory failure and recent pneumonia, and tested COVID 19 positive and Comment: weakness requiring PT/OT    Skilled nursing interventions to include:  Comment: evaluate and asses    Homebound status evidenced by:  Need the aid of supportive devices such as crutches, canes, wheelchairs or walkers. Leaving home requires a considerable and taxing effort. There is a normal inability to leave the home.    Community Physician to provide follow up care: SHANKAR Gleason     Optional Interventions? No      I certify the face to face encounter for this home health care referral meets the CMS requirements and the encounter/clinical assessment with the patient was, in whole, or in part, for the medical condition(s) listed above, which is the primary reason for home health care. Based on my clinical findings: the service(s) are medically necessary, support the need for home health care, and the homebound criteria are met.  I certify that this patient has had a face to face encounter by myself.  Roberto Carlos Gan M.D. - NPI: [id number]

## 2021-10-14 NOTE — DISCHARGE PLANNING
ATTN: Case Management  RE: Referral for Home Health    As of 10/14/2021, we have accepted the Home Health referral for the patient listed above.    A [facility] clinician will be out to see the patient within 48 hours. If you have any questions or concerns regarding the patient’s transition to Home Health, please do not hesitate to contact us at [phone].      We look forward to collaborating with you,  [facility] Team

## 2021-10-15 ENCOUNTER — APPOINTMENT (OUTPATIENT)
Dept: VASCULAR LAB | Facility: MEDICAL CENTER | Age: 46
End: 2021-10-15
Attending: INTERNAL MEDICINE
Payer: MEDICAID

## 2021-10-16 ENCOUNTER — HOME CARE VISIT (OUTPATIENT)
Dept: HOME HEALTH SERVICES | Facility: HOME HEALTHCARE | Age: 46
End: 2021-10-16
Payer: MEDICAID

## 2021-10-16 PROCEDURE — G0493 RN CARE EA 15 MIN HH/HOSPICE: HCPCS

## 2021-10-16 SDOH — ECONOMIC STABILITY: HOUSING INSECURITY: HOME SAFETY: HE REMOVES HER OXYGEN AND GOES OUTSIDE HER APARTMENT TO SMOKE.

## 2021-10-16 SDOH — ECONOMIC STABILITY: HOUSING INSECURITY: EVIDENCE OF SMOKING MATERIAL: 0

## 2021-10-16 SDOH — ECONOMIC STABILITY: HOUSING INSECURITY
HOME SAFETY: PATIENT AMBULATES IN APARMENT WITHOUT WALKER, NOT ALWAYS AWARE OF OXYGEN TUBING, FALL RISK. PATIENT IS USING NICOTINE PATCH AS ORDERED, STATES HAS NOT SMOKED SINCE RETURNING HOME, NO EVIDENCE OF SMOKING MATERIAL PRESENT. PATIENT STATES HISTORICALLY S

## 2021-10-16 ASSESSMENT — ENCOUNTER SYMPTOMS
SEVERE DYSPNEA: 1
VOMITING: DENIES
AGITATION: 1
PERSON REPORTING PAIN: PATIENT
PAIN SEVERITY GOAL: 0/10
SHORTNESS OF BREATH: T
THOUGHT CONTENT - SUSPICIOUS: 1
FATIGUE: 1
HIGHEST PAIN SEVERITY IN PAST 24 HOURS: 0/10
POOR JUDGMENT: 1
COUGH: 1
PAIN: 1
LOWEST PAIN SEVERITY IN PAST 24 HOURS: 0/10
SUBJECTIVE PAIN PROGRESSION: RESOLVED
NAUSEA: DENIES

## 2021-10-16 ASSESSMENT — ACTIVITIES OF DAILY LIVING (ADL): OASIS_M1830: 03

## 2021-10-17 NOTE — CASE COMMUNICATION
Admitted to [facility], medication reconciliation started, not completed as patient's bottles were locked in safe and SN not able to access at SOC visit, the only meds on POC are those reconciled at SOC, no major drug drug interations, for review. Thank you, Yudi GREENE

## 2021-10-18 ENCOUNTER — DOCUMENTATION (OUTPATIENT)
Dept: VASCULAR LAB | Facility: MEDICAL CENTER | Age: 46
End: 2021-10-18

## 2021-10-18 ENCOUNTER — APPOINTMENT (OUTPATIENT)
Dept: VASCULAR LAB | Facility: MEDICAL CENTER | Age: 46
End: 2021-10-18
Payer: MEDICAID

## 2021-10-18 ENCOUNTER — DOCUMENTATION (OUTPATIENT)
Dept: MEDICAL GROUP | Facility: PHYSICIAN GROUP | Age: 46
End: 2021-10-18

## 2021-10-18 DIAGNOSIS — I48.0 PAROXYSMAL A-FIB (HCC): ICD-10-CM

## 2021-10-18 DIAGNOSIS — Z79.01 CHRONIC ANTICOAGULATION: ICD-10-CM

## 2021-10-18 DIAGNOSIS — I48.91 ATRIAL FIBRILLATION, UNSPECIFIED TYPE (HCC): ICD-10-CM

## 2021-10-18 DIAGNOSIS — Z79.01 CHRONIC ANTICOAGULATION: Primary | ICD-10-CM

## 2021-10-18 RX ORDER — WARFARIN SODIUM 6 MG/1
TABLET ORAL
Qty: 30 TABLET | Refills: 0 | Status: SHIPPED | OUTPATIENT
Start: 2021-10-18 | End: 2021-11-10 | Stop reason: SDUPTHER

## 2021-10-18 NOTE — PROGRESS NOTES
[facility]    Received call from [facility] JC Cardoza. Pt is back on [facility] service with recent COVID.    Cancelled routine INR appt for 10/18/21 and placed INR order for tomorrow at  visit.      Carlos Barros, PharmD, [facility] at [phone], fax [phone]

## 2021-10-18 NOTE — PROGRESS NOTES
Received notification that [facility] was unable to draw INR today.  Placed order for [facility] to check INR tomorrow    Hilda GuzmanD

## 2021-10-18 NOTE — PROGRESS NOTES
Medication chart review for [facility] services    PCP:  SHANKAR Gleason  [address] [address]  Fax: [phone]    Current medication list     Current Outpatient Medications:   •  albuterol, 2 Puff, Inhalation, Q4HRS PRN  •  acetaminophen, 325 mg, Oral, Q8HRS PRN  •  budesonide-formoterol, 2 Puff, Inhalation, BID  •  dexamethasone, 6 mg, Oral, DAILY  •  nicotine, 1 Patch, Transdermal, Q24HRS  •  atorvastatin, 20 mg, Oral, DAILY  •  NON SPECIFIED, 1 Application, Topical, QDAY PRN  •  traZODone,  mg, Oral, DAILY  •  warfarin, Take one-half tablet (3mg) on Mondays, and 1 tablet (6mg) all other days or as directed by the [facility] (Patient taking differently: 6 mg, Oral, DAILY, or as directed by the [facility])  •  Home Care Oxygen, 2 L/min, Inhalation, Continuous  •  fluticasone, 1-2 Spray, Nasal, BID  •  famotidine, 40 mg, Oral, DAILY  •  nitroglycerin, 0.4 mg, Sublingual, PRN  •  albuterol, 2 Puff, Inhalation, Q6HRS PRN (Patient not taking: Reported on 10/16/2021)  •  montelukast, 10 mg, Oral, QHS  •  Spiriva HandiHaler, 18 mcg, Inhalation, DAILY  •  aripiprazole, 30 mg, Oral, QAM    Allergies   Allergen Reactions   • Amoxicillin Rash and Itching     Tolerates cephalosporins, pt reports that she gets a rash all over her body and gets itchy   • Penicillin G Rash and Itching     pt reports that she gets a rash all over her body and gets itchy       Labs     Lab Results   Component Value Date/Time    SODIUM 140 10/13/2021 04:01 AM    POTASSIUM 4.5 10/13/2021 04:01 AM    CHLORIDE 103 10/13/2021 04:01 AM    CO2 27 10/13/2021 04:01 AM    GLUCOSE 118 (H) 10/13/2021 04:01 AM    BUN 18 10/13/2021 04:01 AM    CREATININE 0.71 10/13/2021 04:01 AM    CREATININE 0.9 09/11/2008 01:45 PM     Lab Results   Component Value Date/Time    ALKPHOSPHAT 84 10/13/2021 04:01 AM    ASTSGOT 20 10/13/2021 04:01 AM    ALTSGPT 18 10/13/2021 04:01 AM    TBILIRUBIN 0.6 10/13/2021 04:01 AM     INR 1.79 (H) 10/13/2021 04:01 AM    ALBUMIN 3.6 10/13/2021 04:01 AM        Assessment and Plan:   • Received referral from [facility]. Medications reviewed.       Shaw Read, PharmD, MS, BCACP, [facility]  Phone [phone] fax [phone]    This note was created using voice recognition software (Dragon). The accuracy of the dictation is limited by the abilities of the software. I have reviewed the note prior to signing, however some errors in grammar and context are still possible. If you have any questions related to this note please do not hesitate to contact our office.

## 2021-10-19 ENCOUNTER — HOME CARE VISIT (OUTPATIENT)
Dept: HOME HEALTH SERVICES | Facility: HOME HEALTHCARE | Age: 46
End: 2021-10-19
Payer: MEDICAID

## 2021-10-19 ENCOUNTER — ANTICOAGULATION MONITORING (OUTPATIENT)
Dept: MEDICAL GROUP | Facility: PHYSICIAN GROUP | Age: 46
End: 2021-10-19

## 2021-10-19 DIAGNOSIS — I48.91 ATRIAL FIBRILLATION, UNSPECIFIED TYPE (HCC): ICD-10-CM

## 2021-10-19 LAB
INR PPP: 6.3 (ref 2–3.5)
INR PPP: 6.3 (ref 2–3.5)
INR PPP: 6.3 - CRITICAL LOW: < 0.8, CRITICAL HIGH: > 6.5 (ref 2–3.5)

## 2021-10-19 PROCEDURE — 665003 FOLLOW UP-HOME HEALTH

## 2021-10-19 PROCEDURE — G0299 HHS/HOSPICE OF RN EA 15 MIN: HCPCS

## 2021-10-19 NOTE — PROGRESS NOTES
Anticoagulation Summary  As of 10/19/2021    INR goal:  2.0-3.0   TTR:  30.3 % (5.6 mo)   INR used for dosin.30 (10/19/2021)   Warfarin maintenance plan:  9 mg (6 mg x 1.5) every Thu; 6 mg (6 mg x 1) all other days   Weekly warfarin total:  45 mg   Plan last modified:  Kinjal Gant PharmD (2021)   Next INR check:  10/21/2021   Target end date:  Indefinite    Indications    Atrial fibrillation (HCC) [I48.91]             Anticoagulation Episode Summary     INR check location:      Preferred lab:      Send INR reminders to:      Comments:  [facility]        Anticoagulation Patient Findings    Left voicemail message to report a supra-therapeutic INR of 6.3.    Will have pt hold warfarin until therapeutic. LVM with instructions to contact the clinic for any s/s of unusual bleeding, bruising, clotting or any changes to diet or medication.    [facility] to draw INR on 10/21/21    Kurt Cai, HildaD

## 2021-10-20 VITALS
HEART RATE: 75 BPM | SYSTOLIC BLOOD PRESSURE: 120 MMHG | DIASTOLIC BLOOD PRESSURE: 64 MMHG | TEMPERATURE: 97.9 F | OXYGEN SATURATION: 96 % | RESPIRATION RATE: 20 BRPM

## 2021-10-20 ASSESSMENT — ENCOUNTER SYMPTOMS
PAIN SEVERITY GOAL: 0/10
PERSON REPORTING PAIN: PATIENT
SHORTNESS OF BREATH: T
VOMITING: DENIES
NAUSEA: DENIES
MUSCLE WEAKNESS: 1
DENIES PAIN: 1
LOWEST PAIN SEVERITY IN PAST 24 HOURS: 0/10
HIGHEST PAIN SEVERITY IN PAST 24 HOURS: 0/10

## 2021-10-20 ASSESSMENT — ACTIVITIES OF DAILY LIVING (ADL)
AMBULATION ASSISTANCE: STAND BY ASSIST
CURRENT_FUNCTION: STAND BY ASSIST

## 2021-10-21 ENCOUNTER — ANTICOAGULATION MONITORING (OUTPATIENT)
Dept: VASCULAR LAB | Facility: MEDICAL CENTER | Age: 46
End: 2021-10-21

## 2021-10-21 ENCOUNTER — HOME CARE VISIT (OUTPATIENT)
Dept: HOME HEALTH SERVICES | Facility: HOME HEALTHCARE | Age: 46
End: 2021-10-21
Payer: MEDICAID

## 2021-10-21 VITALS
OXYGEN SATURATION: 100 % | HEART RATE: 78 BPM | SYSTOLIC BLOOD PRESSURE: 108 MMHG | TEMPERATURE: 98.3 F | DIASTOLIC BLOOD PRESSURE: 62 MMHG | RESPIRATION RATE: 20 BRPM

## 2021-10-21 LAB — INR PPP: 5.2 - CRITICAL LOW: < 0.8, CRITICAL HIGH: > 6.5 (ref 2–3.5)

## 2021-10-21 PROCEDURE — G0299 HHS/HOSPICE OF RN EA 15 MIN: HCPCS

## 2021-10-21 ASSESSMENT — ENCOUNTER SYMPTOMS
PAIN SEVERITY GOAL: 0/10
SHORTNESS OF BREATH: T
PAIN: 1
PERSON REPORTING PAIN: PATIENT
HIGHEST PAIN SEVERITY IN PAST 24 HOURS: 4/10
VOMITING: DENIES
MUSCLE WEAKNESS: 1
LOWEST PAIN SEVERITY IN PAST 24 HOURS: 0/10
SUBJECTIVE PAIN PROGRESSION: GRADUALLY WORSENING
NAUSEA: DENIES
POOR JUDGMENT: 1

## 2021-10-21 ASSESSMENT — ACTIVITIES OF DAILY LIVING (ADL)
CURRENT_FUNCTION: STAND BY ASSIST
AMBULATION ASSISTANCE: STAND BY ASSIST

## 2021-10-21 NOTE — PROGRESS NOTES
Attempted to contact pt regarding critically supratherapeutic INR  Dialed phone number on file 3x    However, unable to lvm as mb is full    Spoke with pt's mother listed as her emergency contact and she states she has not talked to pt in a long time and pt only has one phone number    Will attempt again later    Kinjal Gant, HildaD

## 2021-10-22 ENCOUNTER — HOME CARE VISIT (OUTPATIENT)
Dept: HOME HEALTH SERVICES | Facility: HOME HEALTHCARE | Age: 46
End: 2021-10-22
Payer: MEDICAID

## 2021-10-22 ENCOUNTER — ANTICOAGULATION MONITORING (OUTPATIENT)
Dept: VASCULAR LAB | Facility: MEDICAL CENTER | Age: 46
End: 2021-10-22

## 2021-10-22 VITALS
TEMPERATURE: 97.9 F | DIASTOLIC BLOOD PRESSURE: 60 MMHG | OXYGEN SATURATION: 99 % | RESPIRATION RATE: 16 BRPM | SYSTOLIC BLOOD PRESSURE: 106 MMHG | HEART RATE: 76 BPM

## 2021-10-22 DIAGNOSIS — Z79.01 CHRONIC ANTICOAGULATION: ICD-10-CM

## 2021-10-22 PROCEDURE — G0151 HHCP-SERV OF PT,EA 15 MIN: HCPCS

## 2021-10-22 SDOH — ECONOMIC STABILITY: HOUSING INSECURITY
HOME SAFETY: STIVE DEVICES, USING RAILINGS AND GRAB BARS, GETTING VISION CHECKED REGULARLY, HAVING A HEALTH CARE PROVIDER REVIEW MEDICATIONS REGULARLY, KEEPING PERSONAL ITEMS WITHIN REACH NEAR SLEEPING/SITTING AREAS AND IN CABINETS, CALLING FOR HELP WHEN NEEDED,

## 2021-10-22 SDOH — ECONOMIC STABILITY: HOUSING INSECURITY
HOME SAFETY: PATIENT INSTRUCTED AND VERBALIZED UNDERSTANDING OF FALL INTERVENTIONS INCLUDING:  TAKING UP THROW RUGS, KEEPING WALKWAYS FREE OF CLUTTER AND WELL LIT, WEARING NON-SLIP FOOTWEAR AT ALL TIMES OUT OF BED, USING ENERGY CONSERVATION TECHNIQUES, USING ASSI

## 2021-10-22 SDOH — ECONOMIC STABILITY: HOUSING INSECURITY
HOME SAFETY: EXERCISE REGULARLY UNLESS CONTRAINDICATED BY MD, SECURE PETS WHILE AMBULATING, PLACE NON-SLIP MATS IN THE SHOWER AND TUB, KEEP HOUSE IN GOOD REPAIR, FIX ALL TRIPPING HAZARDS, SECURE CORDS.  URINATE BEFORE GOING TO SLEEP.

## 2021-10-22 ASSESSMENT — GAIT ASSESSMENTS
INITIATION OF GAIT IMMEDIATELY AFTER GO: 1 - NO HESITANCY
WALKING STANCE: 0 - HEELS APART
PATH SCORE: 1
STEP CONTINUITY: 1 - STEPS APPEAR CONTINUOUS
TRUNK: 0 - MARKED SWAY OR USES WALKING AID
PATH: 1 - MILD/MODERATE DEVIATION OR USES WALKING AID
BALANCE AND GAIT SCORE: 23
GAIT SCORE: 8
TRUNK SCORE: 0
STEP SYMMETRY: 1 - RIGHT AND LEFT STEP LENGTH APPEAR EQUAL

## 2021-10-22 ASSESSMENT — ENCOUNTER SYMPTOMS
PERSON REPORTING PAIN: PATIENT
PAIN LOCATION: LEFT HIP
LOWEST PAIN SEVERITY IN PAST 24 HOURS: 0/10
ARTHRALGIAS: 1
PAIN LOCATION - EXACERBATING FACTORS: INACTIVITY
PAIN: 1
PAIN LOCATION - PAIN DURATION: VARIES
HIGHEST PAIN SEVERITY IN PAST 24 HOURS: 5/10
PAIN LOCATION - PAIN SEVERITY: 5/10
PAIN SEVERITY GOAL: 0/10
MUSCLE WEAKNESS: 1
PAIN LOCATION - PAIN FREQUENCY: INTERMITTENT
SUBJECTIVE PAIN PROGRESSION: RAPIDLY IMPROVING
PAIN LOCATION - PAIN QUALITY: SHARP

## 2021-10-22 ASSESSMENT — BALANCE ASSESSMENTS
STANDING BALANCE: 2 - NARROW STANCE WITHOUT SUPPORT
IMMEDIATE STANDING BALANCE FIRST 5 SECONDS: 2 - STEADY WITHOUT WALKER OR OTHER SUPPORT
EYES CLOSED AT MAXIMUM POSITION NUDGED: 1 - STEADY
NUDGED SCORE: 2
BALANCE SCORE: 15
TURNING 360 DEGREES STEPS: 0 - DISCONTINUOUS STEPS
ARISING SCORE: 2
SITTING DOWN: 2 - SAFE, SMOOTH MOTION
ATTEMPTS TO ARISE: 2 - ABLE TO RISE, ONE ATTEMPT
NUDGED: 2 - STEADY
SITTING BALANCE: 1 - STEADY, SAFE
ARISES: 2 - ABLE WITHOUT USING ARMS

## 2021-10-22 ASSESSMENT — ACTIVITIES OF DAILY LIVING (ADL)
AMBULATION ASSISTANCE ON FLAT SURFACES: 1
AMBULATION_DISTANCE/DURATION_TOLERATED: >300 FEET
AMBULATION ASSISTANCE ON UNEVEN SURFACES: 1

## 2021-10-22 NOTE — PROGRESS NOTES
Anticoagulation Summary  As of 10/22/2021    INR goal:  2.0-3.0   TTR:  29.8 % (5.7 mo)   INR used for dosing:  No new INR was available at the time of this encounter.   Warfarin maintenance plan:  9 mg (6 mg x 1.5) every Thu; 6 mg (6 mg x 1) all other days   Weekly warfarin total:  45 mg   Plan last modified:  Kinjal Gant PharmD (9/30/2021)   Next INR check:  10/24/2021   Target end date:  Indefinite    Indications    Atrial fibrillation (HCC) [I48.91]             Anticoagulation Episode Summary     INR check location:      Preferred lab:      Send INR reminders to:      Comments:  [facility]          Refer to Anticoagulation Patient Findings for HPI    Unable to contact patient. Attempted to leave message but voicemail box was full. Spoke with her home health nurse, Sony Fitzpatrick, who reports patient did NOT hold her warfarin 10/19-10/21, she continued to take her normal regimen. Home health is not scheduled to return until Tuesday. Called patient's  Janelle. She is visiting her today and will remove the warfarin from Lorene's pill box. Edward will also have Lorene give us a call back to assess her for signs and symptoms of bleeding. Spoke with nursing supervisor and scheduled an INR draw on Sunday. Notified on call pharmacist.    INR remains supra-therapeutic.     Will have pt HOLD warfarin until INR is therapeutic.     Repeat INR in 48 hours.     Kurt Cai, Junior     Cc.  Chris Kang, HildaD    Addendum:  Spoke with Lorene Haro. Patient reports completing a steroid course 10/14-10/18, which is the most likely cause of her supra therapeutic INR. She also thinks she may have taken 9mg of warfarin 10/18. She denies any signs or symptoms of bleeding.     Kurt Cai, HildaD

## 2021-10-24 ENCOUNTER — HOME CARE VISIT (OUTPATIENT)
Dept: HOME HEALTH SERVICES | Facility: HOME HEALTHCARE | Age: 46
End: 2021-10-24
Payer: MEDICAID

## 2021-10-24 ENCOUNTER — ANTICOAGULATION MONITORING (OUTPATIENT)
Dept: VASCULAR LAB | Facility: MEDICAL CENTER | Age: 46
End: 2021-10-24

## 2021-10-24 VITALS
OXYGEN SATURATION: 93 % | TEMPERATURE: 98 F | RESPIRATION RATE: 20 BRPM | DIASTOLIC BLOOD PRESSURE: 60 MMHG | SYSTOLIC BLOOD PRESSURE: 100 MMHG | HEART RATE: 75 BPM

## 2021-10-24 DIAGNOSIS — Z79.01 CHRONIC ANTICOAGULATION: ICD-10-CM

## 2021-10-24 LAB
INR PPP: 1.8 (ref 2–3.5)
INR PPP: 1.8 - CRITICAL LOW: < 0.8, CRITICAL HIGH: > 6.5 (ref 2–3.5)

## 2021-10-24 PROCEDURE — G0299 HHS/HOSPICE OF RN EA 15 MIN: HCPCS

## 2021-10-24 ASSESSMENT — ENCOUNTER SYMPTOMS
DENIES PAIN: 1
DIFFICULTY THINKING: 1
NAUSEA: DENIES
DEPRESSED MOOD: 1
SHORTNESS OF BREATH: T
VOMITING: DENIES

## 2021-10-24 NOTE — PROGRESS NOTES
Anticoagulation Summary  As of 10/24/2021    INR goal:  2.0-3.0   TTR:  29.8 % (5.7 mo)   INR used for dosin.80 (10/24/2021)   Warfarin maintenance plan:  9 mg (6 mg x 1.5) every Thu; 6 mg (6 mg x 1) all other days   Weekly warfarin total:  45 mg   Plan last modified:  Kinjal Gant PharmD (2021)   Next INR check:  10/27/2021   Target end date:  Indefinite    Indications    Atrial fibrillation (HCC) [I48.91]             Anticoagulation Episode Summary     INR check location:      Preferred lab:      Send INR reminders to:      Comments:  [facility]           Refer to Anticoagulation Patient Findings for HPI  Patient Findings     Positives:  Missed doses (Pt was holding d/t SUPRA-therapeutic INR)    Negatives:  Signs/symptoms of thrombosis, Signs/symptoms of bleeding, Laboratory test error suspected, Change in health, Change in alcohol use, Change in activity, Upcoming invasive procedure, Emergency department visit, Upcoming dental procedure, Extra doses, Change in medications, Change in diet/appetite, Hospital admission, Bruising, Other complaints          Spoke with pt.  INR is SUB-therapeutic.     Pt verifies warfarin weekly dosing - pt's warfarin is currently in a lock box that she will not have access to until tomorrow.    Pt is NOT on antiplatelet therapy.    Will have pt bolus x 1 dose tomorrow (when she is able to obtain her warfarin) w/ 9 mg and to then continue on w/ her current regimen.    Repeat INR in 2 day(s).     Orders faxed to Clari GIPSON.    Chris Kang PharmD

## 2021-10-25 NOTE — CASE COMMUNICATION
Quality Review Completed for [facility] 10/16 OASIS by KANDI Esteves RN on October 24 2021:  Edits completed by KANDI Esteves RN:  1. Added Depression and poor cog to homebound status per narrative

## 2021-10-26 VITALS
HEART RATE: 64 BPM | OXYGEN SATURATION: 98 % | TEMPERATURE: 98.3 F | DIASTOLIC BLOOD PRESSURE: 70 MMHG | RESPIRATION RATE: 20 BRPM | SYSTOLIC BLOOD PRESSURE: 90 MMHG

## 2021-10-27 ENCOUNTER — ANTICOAGULATION MONITORING (OUTPATIENT)
Dept: VASCULAR LAB | Facility: MEDICAL CENTER | Age: 46
End: 2021-10-27

## 2021-10-27 ENCOUNTER — HOME CARE VISIT (OUTPATIENT)
Dept: HOME HEALTH SERVICES | Facility: HOME HEALTHCARE | Age: 46
End: 2021-10-27
Payer: MEDICAID

## 2021-10-27 DIAGNOSIS — Z79.01 CHRONIC ANTICOAGULATION: Primary | ICD-10-CM

## 2021-10-27 LAB
INR PPP: 1.4 (ref 2–3.5)
INR PPP: 1.4 - CRITICAL LOW: < 0.8, CRITICAL HIGH: > 6.5 (ref 2–3.5)

## 2021-10-27 PROCEDURE — G0299 HHS/HOSPICE OF RN EA 15 MIN: HCPCS

## 2021-10-27 ASSESSMENT — ENCOUNTER SYMPTOMS: POOR JUDGMENT: 1

## 2021-10-27 NOTE — PROGRESS NOTES
Anticoagulation Summary  As of 10/27/2021    INR goal:  2.0-3.0   TTR:  29.2 % (5.9 mo)   INR used for dosin.40 (10/27/2021)   Warfarin maintenance plan:  9 mg (6 mg x 1.5) every Thu; 6 mg (6 mg x 1) all other days   Weekly warfarin total:  45 mg   Plan last modified:  Kinjal Gant PharmD (2021)   Next INR check:  11/3/2021   Target end date:  Indefinite    Indications    Atrial fibrillation (HCC) [I48.91]             Anticoagulation Episode Summary     INR check location:      Preferred lab:      Send INR reminders to:      Comments:  [facility]           Refer to Anticoagulation Patient Findings for HPI  Patient Findings     Negatives:  Signs/symptoms of thrombosis, Signs/symptoms of bleeding, Laboratory test error suspected, Change in health, Change in alcohol use, Change in activity, Upcoming invasive procedure, Emergency department visit, Upcoming dental procedure, Missed doses, Extra doses, Change in medications, Change in diet/appetite, Hospital admission, Bruising, Other complaints          Spoke with pt.  INR is subtherapeutic.     Pt verifies warfarin weekly dosing.     Will have pt continue regimen to ensure INR remains therapeutic    Repeat INR in 1 week(s).     Kinjal Gant, PharmD

## 2021-10-27 NOTE — CASE COMMUNICATION
Agree with CDI recommendations.  JC Bagley    ----- Message -----  From: Ally Esteves R.N.  Sent: 10/24/2021   9:06 PM PDT  To: Maureen Portillo R.N.      Quality Review Completed for [facility] 10/16 OASIS by KANDI Esteves RN on October 24 2021:  Edits completed by KANDI Esteves RN:  1. Added Depression and poor cog to homebound status per narrative

## 2021-10-28 VITALS
HEART RATE: 72 BPM | RESPIRATION RATE: 20 BRPM | TEMPERATURE: 98.2 F | SYSTOLIC BLOOD PRESSURE: 104 MMHG | DIASTOLIC BLOOD PRESSURE: 58 MMHG | OXYGEN SATURATION: 98 %

## 2021-10-28 ASSESSMENT — ENCOUNTER SYMPTOMS
LOWEST PAIN SEVERITY IN PAST 24 HOURS: 3/10
PAIN: 1
VOMITING: DENIES
MUSCLE WEAKNESS: 1
HIGHEST PAIN SEVERITY IN PAST 24 HOURS: 5/10
NAUSEA: DENIES
PERSON REPORTING PAIN: PATIENT
SHORTNESS OF BREATH: T
PAIN SEVERITY GOAL: 0/10

## 2021-10-28 ASSESSMENT — ACTIVITIES OF DAILY LIVING (ADL)
AMBULATION ASSISTANCE: STAND BY ASSIST
CURRENT_FUNCTION: STAND BY ASSIST

## 2021-10-29 ENCOUNTER — HOME CARE VISIT (OUTPATIENT)
Dept: HOME HEALTH SERVICES | Facility: HOME HEALTHCARE | Age: 46
End: 2021-10-29
Payer: MEDICAID

## 2021-11-03 ENCOUNTER — HOME CARE VISIT (OUTPATIENT)
Dept: HOME HEALTH SERVICES | Facility: HOME HEALTHCARE | Age: 46
End: 2021-11-03
Payer: MEDICAID

## 2021-11-03 PROCEDURE — G0493 RN CARE EA 15 MIN HH/HOSPICE: HCPCS

## 2021-11-04 VITALS
TEMPERATURE: 98.7 F | DIASTOLIC BLOOD PRESSURE: 64 MMHG | OXYGEN SATURATION: 98 % | SYSTOLIC BLOOD PRESSURE: 108 MMHG | HEART RATE: 89 BPM | RESPIRATION RATE: 20 BRPM

## 2021-11-04 LAB
INR PPP: 3.1 (ref 2–3.5)
INR PPP: 3.1 - CRITICAL LOW: < 0.8, CRITICAL HIGH: > 6.5 (ref 2–3.5)

## 2021-11-04 SDOH — ECONOMIC STABILITY: HOUSING INSECURITY
HOME SAFETY: PT HAS MODERATE CLUTTER THAT CREATES A POTENTIAL RISK AS A TRIP/SLIP HAZARD. PT EDUCATED TO REDUCE CLUTTER TO REDUCE RISK FOR FALLS/INJURY.

## 2021-11-04 SDOH — ECONOMIC STABILITY: HOUSING INSECURITY: EVIDENCE OF SMOKING MATERIAL: 1

## 2021-11-04 ASSESSMENT — ENCOUNTER SYMPTOMS
POOR JUDGMENT: 1
PERSON REPORTING PAIN: PATIENT
HIGHEST PAIN SEVERITY IN PAST 24 HOURS: 0/10
PAIN SEVERITY GOAL: 0/10
LOWEST PAIN SEVERITY IN PAST 24 HOURS: 0/10
DENIES PAIN: 1

## 2021-11-04 ASSESSMENT — ACTIVITIES OF DAILY LIVING (ADL)
HOME_HEALTH_OASIS: 01
OASIS_M1830: 02

## 2021-11-04 ASSESSMENT — PATIENT HEALTH QUESTIONNAIRE - PHQ9: CLINICAL INTERPRETATION OF PHQ2 SCORE: 0

## 2021-11-05 ENCOUNTER — DOCUMENTATION (OUTPATIENT)
Dept: HOME HEALTH SERVICES | Facility: HOME HEALTHCARE | Age: 46
End: 2021-11-05

## 2021-11-05 ENCOUNTER — HOME CARE VISIT (OUTPATIENT)
Dept: HOME HEALTH SERVICES | Facility: HOME HEALTHCARE | Age: 46
End: 2021-11-05
Payer: MEDICAID

## 2021-11-05 ENCOUNTER — ANTICOAGULATION MONITORING (OUTPATIENT)
Dept: MEDICAL GROUP | Facility: PHYSICIAN GROUP | Age: 46
End: 2021-11-05

## 2021-11-05 DIAGNOSIS — I48.91 ATRIAL FIBRILLATION, UNSPECIFIED TYPE (HCC): ICD-10-CM

## 2021-11-05 NOTE — CASE COMMUNICATION
Quality Review for 11.3.21 AL OASIS performed on by TAI Hall RN on 11.5, 2021:    Edits completed by TAI Hall RN:  1. Changed  to 1, per new guidance option #2 should only be chosen if the pt will be on service with other HHA.    2. Sent [facility] referral for COPD and CC to PCP to inform

## 2021-11-05 NOTE — CASE COMMUNICATION
Please fax to DANIEL Gleason: Ms Haro has been referred to the [facility] program for continued care and follow up for COPD after discharge from [facility]. Thank you for allowing [facility] to serve your patients health care needs.

## 2021-11-08 NOTE — PROGRESS NOTES
Anticoagulation Summary  As of 11/5/2021    INR goal:  2.0-3.0   TTR:  30.7 % (6.1 mo)   INR used for dosing:  3.10 (11/4/2021)   Warfarin maintenance plan:  9 mg (6 mg x 1.5) every Thu; 6 mg (6 mg x 1) all other days   Weekly warfarin total:  45 mg   Plan last modified:  Kinjal Gant PharmD (9/30/2021)   Next INR check:  11/11/2021   Target end date:  Indefinite    Indications    Atrial fibrillation (HCC) [I48.91]             Anticoagulation Episode Summary     INR check location:      Preferred lab:      Send INR reminders to:      Comments:  [facility]        Anticoagulation Patient Findings      Left voicemail message to report a supratherapeutic INR of 3.1.    Pt to continue with recently reduced warfarin dosing regimen as listed above. Requested pt contact the clinic for any s/s of unusual bleeding, bruising, clotting or any changes to diet or medication.    FU INR in 1 week(s).  Per chart, pt was discharged from [facility]. Asked pt to return my call to schedule her next INR check ASAP.    Kinjal Gant, HildaD

## 2021-11-08 NOTE — CASE COMMUNICATION
I agree with these changes.    ----- Message -----  From: Aisha Hall R.N.  Sent: 11/5/2021   8:03 AM PST  To: Sony Fitzpatrick R.N.      Quality Review for 11.3.21 DC OASIS performed on by TAI Hall RN on 11.5, 2021:    Edits completed by TAI Hall RN:  1. Changed  to 1, per new guidance option #2 should only be chosen if the pt will be on service with other HHA.    2. Sent [facility] referral for COPD and CC to PCP to inform

## 2021-11-09 ENCOUNTER — APPOINTMENT (OUTPATIENT)
Dept: VASCULAR LAB | Facility: MEDICAL CENTER | Age: 46
End: 2021-11-09
Attending: INTERNAL MEDICINE
Payer: MEDICAID

## 2021-11-10 ENCOUNTER — ANTICOAGULATION VISIT (OUTPATIENT)
Dept: VASCULAR LAB | Facility: MEDICAL CENTER | Age: 46
End: 2021-11-10
Attending: INTERNAL MEDICINE
Payer: MEDICAID

## 2021-11-10 ENCOUNTER — HOSPITAL ENCOUNTER (EMERGENCY)
Facility: MEDICAL CENTER | Age: 46
End: 2021-11-10
Attending: EMERGENCY MEDICINE
Payer: MEDICAID

## 2021-11-10 ENCOUNTER — APPOINTMENT (OUTPATIENT)
Dept: RADIOLOGY | Facility: MEDICAL CENTER | Age: 46
End: 2021-11-10
Attending: EMERGENCY MEDICINE
Payer: MEDICAID

## 2021-11-10 VITALS — HEART RATE: 80 BPM | DIASTOLIC BLOOD PRESSURE: 57 MMHG | SYSTOLIC BLOOD PRESSURE: 111 MMHG

## 2021-11-10 VITALS
OXYGEN SATURATION: 98 % | RESPIRATION RATE: 16 BRPM | HEIGHT: 62 IN | TEMPERATURE: 98 F | WEIGHT: 277 LBS | DIASTOLIC BLOOD PRESSURE: 67 MMHG | SYSTOLIC BLOOD PRESSURE: 128 MMHG | HEART RATE: 71 BPM | BODY MASS INDEX: 50.97 KG/M2

## 2021-11-10 DIAGNOSIS — Z79.01 CHRONIC ANTICOAGULATION: ICD-10-CM

## 2021-11-10 DIAGNOSIS — I48.91 ATRIAL FIBRILLATION, UNSPECIFIED TYPE (HCC): ICD-10-CM

## 2021-11-10 DIAGNOSIS — J02.9 VIRAL PHARYNGITIS: ICD-10-CM

## 2021-11-10 LAB
ALBUMIN SERPL BCP-MCNC: 3.9 G/DL (ref 3.2–4.9)
ALBUMIN/GLOB SERPL: 1.3 G/DL
ALP SERPL-CCNC: 123 U/L (ref 30–99)
ALT SERPL-CCNC: 11 U/L (ref 2–50)
ANION GAP SERPL CALC-SCNC: 6 MMOL/L (ref 7–16)
ANISOCYTOSIS BLD QL SMEAR: ABNORMAL
AST SERPL-CCNC: 9 U/L (ref 12–45)
BASOPHILS # BLD AUTO: 0.2 % (ref 0–1.8)
BASOPHILS # BLD: 0.02 K/UL (ref 0–0.12)
BILIRUB SERPL-MCNC: 0.5 MG/DL (ref 0.1–1.5)
BUN SERPL-MCNC: 13 MG/DL (ref 8–22)
CALCIUM SERPL-MCNC: 9 MG/DL (ref 8.5–10.5)
CHLORIDE SERPL-SCNC: 106 MMOL/L (ref 96–112)
CO2 SERPL-SCNC: 30 MMOL/L (ref 20–33)
COMMENT 1642: NORMAL
CREAT SERPL-MCNC: 0.88 MG/DL (ref 0.5–1.4)
EOSINOPHIL # BLD AUTO: 0.09 K/UL (ref 0–0.51)
EOSINOPHIL NFR BLD: 1 % (ref 0–6.9)
ERYTHROCYTE [DISTWIDTH] IN BLOOD BY AUTOMATED COUNT: 58.1 FL (ref 35.9–50)
GLOBULIN SER CALC-MCNC: 3 G/DL (ref 1.9–3.5)
GLUCOSE SERPL-MCNC: 118 MG/DL (ref 65–99)
HCT VFR BLD AUTO: 40.6 % (ref 37–47)
HGB BLD-MCNC: 12 G/DL (ref 12–16)
IMM GRANULOCYTES # BLD AUTO: 0.04 K/UL (ref 0–0.11)
IMM GRANULOCYTES NFR BLD AUTO: 0.4 % (ref 0–0.9)
INR BLD: 1.6 (ref 0.9–1.2)
INR PPP: 1.6 (ref 2–3.5)
LIPASE SERPL-CCNC: 27 U/L (ref 11–82)
LYMPHOCYTES # BLD AUTO: 1.94 K/UL (ref 1–4.8)
LYMPHOCYTES NFR BLD: 21.5 % (ref 22–41)
MACROCYTES BLD QL SMEAR: ABNORMAL
MCH RBC QN AUTO: 26 PG (ref 27–33)
MCHC RBC AUTO-ENTMCNC: 29.6 G/DL (ref 33.6–35)
MCV RBC AUTO: 87.9 FL (ref 81.4–97.8)
MICROCYTES BLD QL SMEAR: ABNORMAL
MONOCYTES # BLD AUTO: 0.69 K/UL (ref 0–0.85)
MONOCYTES NFR BLD AUTO: 7.6 % (ref 0–13.4)
MORPHOLOGY BLD-IMP: NORMAL
NEUTROPHILS # BLD AUTO: 6.26 K/UL (ref 2–7.15)
NEUTROPHILS NFR BLD: 69.3 % (ref 44–72)
NRBC # BLD AUTO: 0 K/UL
NRBC BLD-RTO: 0 /100 WBC
OVALOCYTES BLD QL SMEAR: NORMAL
PLATELET # BLD AUTO: 299 K/UL (ref 164–446)
PLATELET BLD QL SMEAR: NORMAL
PMV BLD AUTO: 11 FL (ref 9–12.9)
POIKILOCYTOSIS BLD QL SMEAR: NORMAL
POLYCHROMASIA BLD QL SMEAR: NORMAL
POTASSIUM SERPL-SCNC: 4.6 MMOL/L (ref 3.6–5.5)
PROT SERPL-MCNC: 6.9 G/DL (ref 6–8.2)
RBC # BLD AUTO: 4.62 M/UL (ref 4.2–5.4)
RBC BLD AUTO: PRESENT
S PYO DNA SPEC NAA+PROBE: NOT DETECTED
SODIUM SERPL-SCNC: 142 MMOL/L (ref 135–145)
WBC # BLD AUTO: 9 K/UL (ref 4.8–10.8)

## 2021-11-10 PROCEDURE — 87651 STREP A DNA AMP PROBE: CPT

## 2021-11-10 PROCEDURE — 71045 X-RAY EXAM CHEST 1 VIEW: CPT

## 2021-11-10 PROCEDURE — 85025 COMPLETE CBC W/AUTO DIFF WBC: CPT

## 2021-11-10 PROCEDURE — 85610 PROTHROMBIN TIME: CPT

## 2021-11-10 PROCEDURE — 83690 ASSAY OF LIPASE: CPT

## 2021-11-10 PROCEDURE — 99283 EMERGENCY DEPT VISIT LOW MDM: CPT

## 2021-11-10 PROCEDURE — 99212 OFFICE O/P EST SF 10 MIN: CPT

## 2021-11-10 PROCEDURE — 80053 COMPREHEN METABOLIC PANEL: CPT

## 2021-11-10 RX ORDER — ONDANSETRON 4 MG/1
4 TABLET, FILM COATED ORAL EVERY 4 HOURS PRN
Qty: 20 TABLET | Refills: 0 | Status: SHIPPED | OUTPATIENT
Start: 2021-11-10 | End: 2022-02-26

## 2021-11-10 RX ORDER — WARFARIN SODIUM 6 MG/1
6-9 TABLET ORAL DAILY
Qty: 135 TABLET | Refills: 1 | Status: SHIPPED | OUTPATIENT
Start: 2021-11-10 | End: 2021-12-07

## 2021-11-10 ASSESSMENT — FIBROSIS 4 INDEX: FIB4 SCORE: 1.5

## 2021-11-10 NOTE — ED PROVIDER NOTES
ED Provider Note    CHIEF COMPLAINT  Chief Complaint   Patient presents with   • Sore Throat     started 2 weeks ago       HPI  Lorene Haro is a 46 y.o. female who presents with sore throat.  Patient has multiple chronic medical problems including COPD, on chronic home O2, paroxysmal atrial fibrillation and severe pulmonary hypertension.  Patient approximately 1 month ago was diagnosed with COVID-19 pneumonia and finished a course of dexamethasone.  Patient reports that her shortness of breath has improved.  She still reports a nagging cough.  She also reports a new sore throat.  She denies any associated chest pain.  Patient denies any increase in her oxygen.  Patient denies any associated neck pain.  She reports pain is worse on swallowing.  Patient denies any fevers or chills.  She denies any myalgias.  Patient is followed by home health nursing.    REVIEW OF SYSTEMS  ROS  See [facility] for further details. All other systems are negative.     PAST MEDICAL HISTORY   has a past medical history of Asthma, Bipolar 2 disorder (HCC), Chickenpox, Chronic obstructive pulmonary disease (HCC), Hypertension, On home oxygen therapy, Other psychological stress, Schizophrenic disorder ([facility]), and Yeast infection of the skin (2021).    SOCIAL HISTORY  Social History     Tobacco Use   • Smoking status: Current Every Day Smoker     Packs/day: 0.50     Years: 20.00     Pack years: 10.00     Types: Cigarettes     Last attempt to quit: 2021     Years since quittin.2   • Smokeless tobacco: Never Used   Vaping Use   • Vaping Use: Never used   Substance and Sexual Activity   • Alcohol use: No   • Drug use: No   • Sexual activity: Not Currently       SURGICAL HISTORY   has a past surgical history that includes hysterectomy laparoscopy; colectomy; cholecystectomy; and knee arthroscopy (Left).    CURRENT MEDICATIONS  Home Medications    **Home medications have not yet been reviewed for this encounter**          ALLERGIES  Allergies   Allergen Reactions   • Amoxicillin Rash and Itching     Tolerates cephalosporins, pt reports that she gets a rash all over her body and gets itchy   • Penicillin G Rash and Itching     pt reports that she gets a rash all over her body and gets itchy       PHYSICAL EXAM  Vitals:    11/10/21 0016   BP: 117/78   Pulse: 72   Resp: 16   Temp: 37 °C (98.6 °F)   SpO2: 98%       Physical Exam  Constitutional:       Appearance: She is well-developed.      Comments: obese   HENT:      Head: Normocephalic and atraumatic.      Mouth/Throat:      Comments: Bilateral mild tonsillar hypertrophy with associated exudate, no trismus, no facial swelling, no floor of mouth swelling or submandibular fullness, no stridor. No pharyngeal asymmetry. Nl phonation    Eyes:      Conjunctiva/sclera: Conjunctivae normal.   Cardiovascular:      Rate and Rhythm: Normal rate and regular rhythm.   Pulmonary:      Effort: Pulmonary effort is normal.      Breath sounds: Normal breath sounds.   Abdominal:      General: Bowel sounds are normal. There is no distension.      Palpations: Abdomen is soft.      Tenderness: There is no abdominal tenderness. There is no rebound.   Musculoskeletal:      Cervical back: Normal range of motion and neck supple.   Skin:     General: Skin is warm and dry.      Findings: No rash.   Neurological:      Mental Status: She is alert and oriented to person, place, and time.   Psychiatric:         Behavior: Behavior normal.       DX-CHEST-PORTABLE (1 VIEW)   Final Result         1.  No acute cardiopulmonary disease.        Results for orders placed or performed during the hospital encounter of 11/10/21   Group A Strep by PCR    Specimen: Throat   Result Value Ref Range    Group A Strep by PCR Not Detected Not Detected         COURSE & MEDICAL DECISION MAKING  Pertinent Labs & Imaging studies reviewed. (See chart for details)    Patient here with chief complaint of sore throat.  Patient is very  well-appearing but does have some mild exudative pharyngitis.  She does have some mild nausea.  Patient appears well but does have multiple chronic complaints.  Given patient's mild associated nausea will check basic labs especially given patient was recently hospitalized.  Checking strep.  Patient does not appear to have any deep space abscess or airway issue at this point.    Pt basic labs are pending. Pt signed out to my oncoming partner  Pt placed in obs 11/10/21  Family hx not contributory     The patient will return for worsening symptoms and is stable at the time of discharge. The patient verbalizes understanding and will comply.    FINAL IMPRESSION  1. Sore throat    Electronically signed by: Yanick Josue M.D., 11/10/2021 1:18 AM

## 2021-11-10 NOTE — ED NOTES
Called lab to inquire about the status of the patients. Per lab, the blood was redrawn by phlebotomy, but they are awaiting a collection time from the phlebotomist before the records can be released

## 2021-11-10 NOTE — PROGRESS NOTES
Anticoagulation Summary  As of 11/10/2021    INR goal:  2.0-3.0   TTR:  31.7 % (6.3 mo)   INR used for dosin.60 (11/10/2021)   Warfarin maintenance plan:  9 mg (6 mg x 1.5) every Wed, Fri; 6 mg (6 mg x 1) all other days   Weekly warfarin total:  48 mg   Plan last modified:  Kinjal Gant PharmD (11/10/2021)   Next INR check:  2021   Target end date:  Indefinite    Indications    Atrial fibrillation (HCC) [I48.91]             Anticoagulation Episode Summary     INR check location:      Preferred lab:      Send INR reminders to:      Comments:            Refer to Patient Findings for HPI:  Patient Findings     Positives:  Emergency department visit (for pharyngitis), Change in diet/appetite (lack of appetite)    Negatives:  Signs/symptoms of thrombosis, Signs/symptoms of bleeding, Laboratory test error suspected, Change in health, Change in alcohol use, Change in activity, Upcoming invasive procedure, Upcoming dental procedure, Missed doses, Extra doses, Change in medications, Hospital admission, Bruising, Other complaints          Vitals:    11/10/21 1116   BP: 111/57   Pulse: 80         Verified current warfarin dosing schedule.    Unable to reconcile medications as caregiver was not present  Pt is not on antiplatelet therapy      A/P   INR  sub-therapeutic.     Warfarin dosing recommendation: Increase weekly regimen    Pt educated to contact our clinic with any changes in medications or s/s of bleeding or thrombosis. Pt is aware to seek immediate medical attention for falls, head injury or deep cuts.    Follow up appointment in 1 week(s).    Kinjal Gant PharmD

## 2021-11-10 NOTE — ED NOTES
Patient calm and in room at this time. Respirations even and unlabored. No pain or distress reported. Awaiting disposition

## 2021-11-10 NOTE — ED PROVIDER NOTES
"ED Provider Note    Patient:Lorene Haro  Patient : 1975  Patient MRN: [id number]  Patient PCP: SHANKAR Gleason    Admit Date: 11/10/2021  Discharge Date and Time: 11/10/21 6:27 AM  Discharge Diagnosis: sore throat  Discharge Attending: Esteban Guillory M.D.  Discharge Service: ED Observation    ED Course  Lorene is a 46 y.o. female who was evaluated at [facility] for sore throat.  While patient has multiple medical comorbidities, she did not appear to have any convincing findings to suggest an emergent issue today and specifically did not appear to have strep pharyngitis.  Patient was pending a laboratory evaluation however her lab did not run CMP sample but never called to let us know.  Subsequently the patient sat in the emergency department for several hours while waiting for this test that was never done.  I discussed this with the patient at bedside.  Given the findings today, I do not feel a CMP or lipase will benefit the patient or  at this point and I did review her CMP from October which was unremarkable.  The patient felt comfortable being discharged with symptomatic treatment at home and will return if her symptoms worsen or change in any way.    Discharge Exam:  /80   Pulse 86   Temp 37 °C (98.6 °F) (Temporal)   Resp 18   Ht 1.575 m (5' 2\")   Wt (!) 126 kg (277 lb)   LMP  (LMP Unknown)   SpO2 97%   BMI 50.66 kg/m² .    Constitutional: Appears tired but otherwise appropriate.  HENT: Still has mild posterior pharynx erythema  Cardiovascular: Normal heart rate    Thorax & Lungs: No respiratory distress      Labs  Results for orders placed or performed during the hospital encounter of 11/10/21   Group A Strep by PCR    Specimen: Throat   Result Value Ref Range    Group A Strep by PCR Not Detected Not Detected   CBC WITH DIFFERENTIAL   Result Value Ref Range    WBC 9.0 4.8 - 10.8 K/uL    RBC 4.62 4.20 - 5.40 M/uL    Hemoglobin 12.0 12.0 - 16.0 g/dL    " Hematocrit 40.6 37.0 - 47.0 %    MCV 87.9 81.4 - 97.8 fL    MCH 26.0 (L) 27.0 - 33.0 pg    MCHC 29.6 (L) 33.6 - 35.0 g/dL    RDW 58.1 (H) 35.9 - 50.0 fL    Platelet Count 299 164 - 446 K/uL    MPV 11.0 9.0 - 12.9 fL    Neutrophils-Polys 69.30 44.00 - 72.00 %    Lymphocytes 21.50 (L) 22.00 - 41.00 %    Monocytes 7.60 0.00 - 13.40 %    Eosinophils 1.00 0.00 - 6.90 %    Basophils 0.20 0.00 - 1.80 %    Immature Granulocytes 0.40 0.00 - 0.90 %    Nucleated RBC 0.00 /100 WBC    Neutrophils (Absolute) 6.26 2.00 - 7.15 K/uL    Lymphs (Absolute) 1.94 1.00 - 4.80 K/uL    Monos (Absolute) 0.69 0.00 - 0.85 K/uL    Eos (Absolute) 0.09 0.00 - 0.51 K/uL    Baso (Absolute) 0.02 0.00 - 0.12 K/uL    Immature Granulocytes (abs) 0.04 0.00 - 0.11 K/uL    NRBC (Absolute) 0.00 K/uL    Anisocytosis 1+     Macrocytosis 1+     Microcytosis 1+    PERIPHERAL SMEAR REVIEW   Result Value Ref Range    Peripheral Smear Review see below    PLATELET ESTIMATE   Result Value Ref Range    Plt Estimation Normal    MORPHOLOGY   Result Value Ref Range    RBC Morphology Present     Polychromia 1+     Poikilocytosis 1+     Ovalocytes 1+    DIFFERENTIAL COMMENT   Result Value Ref Range    Comments-Diff see below        Radiology  DX-CHEST-PORTABLE (1 VIEW)   Final Result         1.  No acute cardiopulmonary disease.          Disposition: Home    Follow up: No follow-ups on file.    Medications:   New Prescriptions    ONDANSETRON (ZOFRAN) 4 MG TAB TABLET    Take 1 Tablet by mouth every four hours as needed for Nausea/Vomiting.       Discharge Condition: Stable    Electronically signed by: Esteban Guillory M.D., 11/10/2021 6:27 AM

## 2021-11-16 ENCOUNTER — ANTICOAGULATION VISIT (OUTPATIENT)
Dept: VASCULAR LAB | Facility: MEDICAL CENTER | Age: 46
End: 2021-11-16
Attending: INTERNAL MEDICINE
Payer: MEDICAID

## 2021-11-16 DIAGNOSIS — Z79.01 CHRONIC ANTICOAGULATION: ICD-10-CM

## 2021-11-16 LAB — INR PPP: 1.9 (ref 2–3.5)

## 2021-11-16 PROCEDURE — 99212 OFFICE O/P EST SF 10 MIN: CPT

## 2021-11-16 PROCEDURE — 85610 PROTHROMBIN TIME: CPT

## 2021-11-16 NOTE — PROGRESS NOTES
Anticoagulation Summary  As of 2021    INR goal:  2.0-3.0   TTR:  30.8 % (6.5 mo)   INR used for dosin.90 (2021)   Warfarin maintenance plan:  9 mg (6 mg x 1.5) every Tue, Thu; 6 mg (6 mg x 1) all other days   Weekly warfarin total:  48 mg   Plan last modified:  Kurt Cai PharmD (2021)   Next INR check:  2021   Target end date:  Indefinite    Indications    Atrial fibrillation (HCC) [I48.91]             Anticoagulation Episode Summary     INR check location:      Preferred lab:      Send INR reminders to:      Comments:                  Refer to Patient Findings for HPI:  Patient Findings     Positives:  Missed doses (Patient did not start increased regimen as prescribed.)    Negatives:  Signs/symptoms of thrombosis, Signs/symptoms of bleeding, Laboratory test error suspected, Change in health, Change in alcohol use, Change in activity, Upcoming invasive procedure, Emergency department visit, Upcoming dental procedure, Extra doses, Change in medications, Change in diet/appetite, Hospital admission, Bruising, Other complaints          There were no vitals filed for this visit.    Verified current warfarin dosing schedule.    Medications reconciled   Pt is not on antiplatelet therapy      A/P   INR  sub-therapeutic.     Warfarin dosing recommendation: Will have patient bolus with 9mg of warfarin tonight  and start previously prescribed increased regimen.    Pt educated to contact our clinic with any changes in medications or s/s of bleeding or thrombosis. Pt is aware to seek immediate medical attention for falls, head injury or deep cuts.    Follow up appointment in 1 week(s).    Kurt Cai PharmD

## 2021-11-17 LAB — INR BLD: 1.9 (ref 0.9–1.2)

## 2021-11-24 ENCOUNTER — ANTICOAGULATION VISIT (OUTPATIENT)
Dept: VASCULAR LAB | Facility: MEDICAL CENTER | Age: 46
End: 2021-11-24
Attending: INTERNAL MEDICINE
Payer: MEDICAID

## 2021-11-24 DIAGNOSIS — I48.11 LONGSTANDING PERSISTENT ATRIAL FIBRILLATION (HCC): ICD-10-CM

## 2021-11-24 DIAGNOSIS — D68.69 SECONDARY HYPERCOAGULABLE STATE (HCC): ICD-10-CM

## 2021-11-24 LAB
INR BLD: 1.9 (ref 0.9–1.2)
INR PPP: 1.9 (ref 2–3.5)

## 2021-11-24 PROCEDURE — 99212 OFFICE O/P EST SF 10 MIN: CPT | Performed by: NURSE PRACTITIONER

## 2021-11-24 PROCEDURE — 85610 PROTHROMBIN TIME: CPT

## 2021-11-24 NOTE — PROGRESS NOTES
Anticoagulation Summary  As of 2021    INR goal:  2.0-3.0   TTR:  29.6 % (6.8 mo)   INR used for dosin.90 (2021)   Warfarin maintenance plan:  9 mg (6 mg x 1.5) every Tue, Thu, Sat; 6 mg (6 mg x 1) all other days   Weekly warfarin total:  51 mg   Plan last modified:  SHANKAR Zhou (2021)   Next INR check:  2021   Target end date:  Indefinite    Indications    Atrial fibrillation (HCC) [I48.91]             Anticoagulation Episode Summary     INR check location:      Preferred lab:      Send INR reminders to:      Comments:                  Refer to Patient Findings for HPI:      There were no vitals filed for this visit.   pt declined vitals    Verified current warfarin dosing schedule.    Medications reconciled   Pt is not on antiplatelet therapy  Atrial fibrillation controlled on n/a.  Secondary hypercoagulable state due to Atrial Fibrillation/Flutter on warfarin.      A/P   INR  slightly sub-therapeutic.     Warfarin dosing recommendation: Began increased regimen.    Pt educated to contact our clinic with any changes in medications or s/s of bleeding or thrombosis. Pt is aware to seek immediate medical attention for falls, head injury or deep cuts.    Follow up appointment in 1 week(s).    SHANKAR Zhou

## 2021-11-27 ENCOUNTER — APPOINTMENT (OUTPATIENT)
Dept: RADIOLOGY | Facility: MEDICAL CENTER | Age: 46
End: 2021-11-27
Attending: EMERGENCY MEDICINE
Payer: MEDICAID

## 2021-11-27 ENCOUNTER — HOSPITAL ENCOUNTER (EMERGENCY)
Facility: MEDICAL CENTER | Age: 46
End: 2021-11-28
Attending: EMERGENCY MEDICINE
Payer: MEDICAID

## 2021-11-27 DIAGNOSIS — R07.9 CHEST PAIN, UNSPECIFIED TYPE: ICD-10-CM

## 2021-11-27 DIAGNOSIS — R11.2 NON-INTRACTABLE VOMITING WITH NAUSEA, UNSPECIFIED VOMITING TYPE: ICD-10-CM

## 2021-11-27 LAB
ALBUMIN SERPL BCP-MCNC: 4.1 G/DL (ref 3.2–4.9)
ALBUMIN/GLOB SERPL: 1.4 G/DL
ALP SERPL-CCNC: 111 U/L (ref 30–99)
ALT SERPL-CCNC: 10 U/L (ref 2–50)
ANION GAP SERPL CALC-SCNC: 7 MMOL/L (ref 7–16)
ANISOCYTOSIS BLD QL SMEAR: ABNORMAL
AST SERPL-CCNC: 8 U/L (ref 12–45)
BASOPHILS # BLD AUTO: 0 % (ref 0–1.8)
BASOPHILS # BLD: 0 K/UL (ref 0–0.12)
BILIRUB SERPL-MCNC: 0.4 MG/DL (ref 0.1–1.5)
BUN SERPL-MCNC: 11 MG/DL (ref 8–22)
CALCIUM SERPL-MCNC: 8.9 MG/DL (ref 8.5–10.5)
CHLORIDE SERPL-SCNC: 103 MMOL/L (ref 96–112)
CO2 SERPL-SCNC: 29 MMOL/L (ref 20–33)
CREAT SERPL-MCNC: 1.23 MG/DL (ref 0.5–1.4)
DACRYOCYTES BLD QL SMEAR: NORMAL
EKG IMPRESSION: NORMAL
EOSINOPHIL # BLD AUTO: 0 K/UL (ref 0–0.51)
EOSINOPHIL NFR BLD: 0 % (ref 0–6.9)
ERYTHROCYTE [DISTWIDTH] IN BLOOD BY AUTOMATED COUNT: 57.6 FL (ref 35.9–50)
GLOBULIN SER CALC-MCNC: 3 G/DL (ref 1.9–3.5)
GLUCOSE SERPL-MCNC: 137 MG/DL (ref 65–99)
HCT VFR BLD AUTO: 36.1 % (ref 37–47)
HGB BLD-MCNC: 10.7 G/DL (ref 12–16)
INR PPP: 2.41 (ref 0.87–1.13)
LIPASE SERPL-CCNC: 23 U/L (ref 11–82)
LYMPHOCYTES # BLD AUTO: 1.24 K/UL (ref 1–4.8)
LYMPHOCYTES NFR BLD: 13.2 % (ref 22–41)
MACROCYTES BLD QL SMEAR: ABNORMAL
MANUAL DIFF BLD: NORMAL
MCH RBC QN AUTO: 25.8 PG (ref 27–33)
MCHC RBC AUTO-ENTMCNC: 29.6 G/DL (ref 33.6–35)
MCV RBC AUTO: 87 FL (ref 81.4–97.8)
MICROCYTES BLD QL SMEAR: ABNORMAL
MONOCYTES # BLD AUTO: 0.57 K/UL (ref 0–0.85)
MONOCYTES NFR BLD AUTO: 6.1 % (ref 0–13.4)
MORPHOLOGY BLD-IMP: NORMAL
NEUTROPHILS # BLD AUTO: 7.59 K/UL (ref 2–7.15)
NEUTROPHILS NFR BLD: 80.7 % (ref 44–72)
NRBC # BLD AUTO: 0 K/UL
NRBC BLD-RTO: 0 /100 WBC
OVALOCYTES BLD QL SMEAR: NORMAL
PLATELET # BLD AUTO: 250 K/UL (ref 164–446)
PLATELET BLD QL SMEAR: NORMAL
PMV BLD AUTO: 10.7 FL (ref 9–12.9)
POIKILOCYTOSIS BLD QL SMEAR: NORMAL
POLYCHROMASIA BLD QL SMEAR: NORMAL
POTASSIUM SERPL-SCNC: 3.7 MMOL/L (ref 3.6–5.5)
PROT SERPL-MCNC: 7.1 G/DL (ref 6–8.2)
PROTHROMBIN TIME: 25.5 SEC (ref 12–14.6)
RBC # BLD AUTO: 4.15 M/UL (ref 4.2–5.4)
RBC BLD AUTO: PRESENT
SODIUM SERPL-SCNC: 139 MMOL/L (ref 135–145)
SPHEROCYTES BLD QL SMEAR: NORMAL
TROPONIN T SERPL-MCNC: 21 NG/L (ref 6–19)
TROPONIN T SERPL-MCNC: 22 NG/L (ref 6–19)
WBC # BLD AUTO: 9.4 K/UL (ref 4.8–10.8)

## 2021-11-27 PROCEDURE — A9270 NON-COVERED ITEM OR SERVICE: HCPCS | Performed by: EMERGENCY MEDICINE

## 2021-11-27 PROCEDURE — 96374 THER/PROPH/DIAG INJ IV PUSH: CPT

## 2021-11-27 PROCEDURE — 83690 ASSAY OF LIPASE: CPT

## 2021-11-27 PROCEDURE — 700111 HCHG RX REV CODE 636 W/ 250 OVERRIDE (IP): Performed by: EMERGENCY MEDICINE

## 2021-11-27 PROCEDURE — 99285 EMERGENCY DEPT VISIT HI MDM: CPT

## 2021-11-27 PROCEDURE — 700102 HCHG RX REV CODE 250 W/ 637 OVERRIDE(OP): Performed by: EMERGENCY MEDICINE

## 2021-11-27 PROCEDURE — 85007 BL SMEAR W/DIFF WBC COUNT: CPT

## 2021-11-27 PROCEDURE — 96375 TX/PRO/DX INJ NEW DRUG ADDON: CPT

## 2021-11-27 PROCEDURE — 93005 ELECTROCARDIOGRAM TRACING: CPT | Performed by: EMERGENCY MEDICINE

## 2021-11-27 PROCEDURE — 80053 COMPREHEN METABOLIC PANEL: CPT

## 2021-11-27 PROCEDURE — 85610 PROTHROMBIN TIME: CPT

## 2021-11-27 PROCEDURE — 85027 COMPLETE CBC AUTOMATED: CPT

## 2021-11-27 PROCEDURE — 84484 ASSAY OF TROPONIN QUANT: CPT

## 2021-11-27 PROCEDURE — 71045 X-RAY EXAM CHEST 1 VIEW: CPT

## 2021-11-27 PROCEDURE — 96376 TX/PRO/DX INJ SAME DRUG ADON: CPT

## 2021-11-27 PROCEDURE — 700117 HCHG RX CONTRAST REV CODE 255: Performed by: EMERGENCY MEDICINE

## 2021-11-27 PROCEDURE — 74177 CT ABD & PELVIS W/CONTRAST: CPT

## 2021-11-27 PROCEDURE — 93005 ELECTROCARDIOGRAM TRACING: CPT

## 2021-11-27 RX ORDER — ONDANSETRON 2 MG/ML
4 INJECTION INTRAMUSCULAR; INTRAVENOUS ONCE
Status: COMPLETED | OUTPATIENT
Start: 2021-11-27 | End: 2021-11-27

## 2021-11-27 RX ORDER — MORPHINE SULFATE 4 MG/ML
4 INJECTION INTRAVENOUS ONCE
Status: COMPLETED | OUTPATIENT
Start: 2021-11-27 | End: 2021-11-27

## 2021-11-27 RX ORDER — ALUMINA, MAGNESIA, AND SIMETHICONE 2400; 2400; 240 MG/30ML; MG/30ML; MG/30ML
10 SUSPENSION ORAL ONCE
Status: COMPLETED | OUTPATIENT
Start: 2021-11-27 | End: 2021-11-27

## 2021-11-27 RX ADMIN — IOHEXOL 100 ML: 350 INJECTION, SOLUTION INTRAVENOUS at 23:07

## 2021-11-27 RX ADMIN — MORPHINE SULFATE 4 MG: 4 INJECTION INTRAVENOUS at 23:06

## 2021-11-27 RX ADMIN — ALUMINUM HYDROXIDE, MAGNESIUM HYDROXIDE, AND DIMETHICONE 10 ML: 400; 400; 40 SUSPENSION ORAL at 21:38

## 2021-11-27 RX ADMIN — ONDANSETRON 4 MG: 2 INJECTION INTRAMUSCULAR; INTRAVENOUS at 21:37

## 2021-11-27 RX ADMIN — MORPHINE SULFATE 4 MG: 4 INJECTION INTRAVENOUS at 21:37

## 2021-11-27 ASSESSMENT — FIBROSIS 4 INDEX: FIB4 SCORE: 0.44

## 2021-11-28 VITALS
SYSTOLIC BLOOD PRESSURE: 138 MMHG | HEIGHT: 61 IN | DIASTOLIC BLOOD PRESSURE: 76 MMHG | TEMPERATURE: 97.8 F | RESPIRATION RATE: 16 BRPM | OXYGEN SATURATION: 96 % | BODY MASS INDEX: 52.3 KG/M2 | HEART RATE: 82 BPM | WEIGHT: 277 LBS

## 2021-11-28 RX ORDER — ONDANSETRON 4 MG/1
4 TABLET, ORALLY DISINTEGRATING ORAL EVERY 8 HOURS PRN
Qty: 10 TABLET | Refills: 0 | Status: SHIPPED | OUTPATIENT
Start: 2021-11-28 | End: 2022-02-26

## 2021-11-28 NOTE — DISCHARGE PLANNING
KETTY called [facility] for taxi transportation home. Pt will be riding home with [employer]. Taxi arriving approx. In next 30 minutes. Notified JC Mccallum.

## 2021-11-28 NOTE — ED PROVIDER NOTES
ED Provider Note      Means of Arrival: EMS  History obtained from: Patient, EMS    CHIEF COMPLAINT  Chief Complaint   Patient presents with   • Chest Pain     Onset 1930 9/10 substernal to epigastric squeezing dull pain unrelieved by home nitro x 2   • Vomiting       HPI  Lorene Haro is a 46 y.o. female who presents with chest pain, vomiting.  She reports that sharp substernal chest pain that radiated down into her stomach and was associated with nausea and vomiting.  She reports it was unrelieved with nitro.  It is now a dull pain.  She reports it is 9/10 in severity.  She denies any fevers, diarrhea, cough.  It does not radiate to her jaw or arms.    REVIEW OF SYSTEMS  CONSTITUTIONAL:  No fever.  CARDIOVASCULAR:  See HPI  RESPIRATORY:  See HPI  GASTROINTESTINAL:   See HPI  GENITOURINARY:   No dysuria.      See HPI for further details.   All other systems are negative.     PAST MEDICAL HISTORY  Past Medical History:   Diagnosis Date   • Asthma    • Bipolar 2 disorder (HCC)    • Chickenpox    • Chronic obstructive pulmonary disease (HCC)    • Hypertension    • On home oxygen therapy    • Other psychological stress    • Schizophrenic disorder (HCC)    • Yeast infection of the skin 4/1/2021       FAMILY HISTORY  Family History   Problem Relation Age of Onset   • No Known Problems Mother    • Cancer Sister        SOCIAL HISTORY   reports that she has been smoking cigarettes. She has a 10.00 pack-year smoking history. She has never used smokeless tobacco. She reports that she does not drink alcohol and does not use drugs.    SURGICAL HISTORY  Past Surgical History:   Procedure Laterality Date   • CHOLECYSTECTOMY     • COLECTOMY     • HYSTERECTOMY LAPAROSCOPY     • KNEE ARTHROSCOPY Left        CURRENT MEDICATIONS  Home Medications     Reviewed by Matt Oglesby R.N. (Registered Nurse) on 11/27/21 at 2116  Med List Status: Partial   Medication Last Dose Status   acetaminophen (TYLENOL) 325 MG Tab  Active  "  albuterol 108 (90 Base) MCG/ACT Aero Soln inhalation aerosol  Active   albuterol 108 (90 Base) MCG/ACT Aero Soln inhalation aerosol  Active   aripiprazole (ABILIFY) 30 MG tablet  Active   atorvastatin (LIPITOR) 20 MG Tab  Active   budesonide-formoterol (SYMBICORT) 160-4.5 MCG/ACT Aerosol  Active   famotidine (PEPCID) 40 MG Tab  Active   fluticasone (FLONASE) 50 MCG/ACT nasal spray  Active   Home Care Oxygen  Active   montelukast (SINGULAIR) 10 MG Tab  Active   nicotine (NICODERM) 14 MG/24HR PATCH 24 HR  Active   nitroglycerin (NITROSTAT) 0.4 MG SL Tab  Active   NON SPECIFIED  Active   ondansetron (ZOFRAN) 4 MG Tab tablet  Active   tiotropium (SPIRIVA HANDIHALER) 18 MCG Cap  Active   traZODone (DESYREL) 50 MG Tab  Active   warfarin (COUMADIN) 6 MG Tab  Active                ALLERGIES  Allergies   Allergen Reactions   • Amoxicillin Rash and Itching     Tolerates cephalosporins, pt reports that she gets a rash all over her body and gets itchy   • Penicillin G Rash and Itching     pt reports that she gets a rash all over her body and gets itchy       PHYSICAL EXAM  VITAL SIGNS: /63   Pulse 85   Temp 36.9 °C (98.5 °F) (Temporal)   Resp 18   Ht 1.549 m (5' 1\")   Wt (!) 126 kg (277 lb)   LMP  (LMP Unknown)   SpO2 96%   BMI 52.34 kg/m²    Gen: Alert, no obvious distress  HENT: ATNC  Eyes: Normal conjunctiva  Neck: trachea midline  Resp: no respiratory distress, CTAB  CV: No JVD, RRR, no m/r/g. Equal radial pulses  Abd: non-distended, epigastric tenderness without rebound or guarding  Ext: No deformities, no edema  Psych: normal mood  Neuro: speech fluent       RADIOLOGY/PROCEDURES  CT-ABDOMEN-PELVIS WITH   Final Result      1.  No acute inflammatory or obstructive process.      2.  Hepatomegaly.      3.  Cholecystectomy and hysterectomy changes.      DX-CHEST-PORTABLE (1 VIEW)   Final Result      No acute cardiac or pulmonary abnormality is noted.          LABS  Labs Reviewed   CBC WITH DIFFERENTIAL - " Abnormal; Notable for the following components:       Result Value    RBC 4.15 (*)     Hemoglobin 10.7 (*)     Hematocrit 36.1 (*)     MCH 25.8 (*)     MCHC 29.6 (*)     RDW 57.6 (*)     Neutrophils-Polys 80.70 (*)     Lymphocytes 13.20 (*)     Neutrophils (Absolute) 7.59 (*)     Microcytosis 2+ (*)     All other components within normal limits   COMP METABOLIC PANEL - Abnormal; Notable for the following components:    Glucose 137 (*)     AST(SGOT) 8 (*)     Alkaline Phosphatase 111 (*)     All other components within normal limits   TROPONIN - Abnormal; Notable for the following components:    Troponin T 21 (*)     All other components within normal limits   PROTHROMBIN TIME - Abnormal; Notable for the following components:    PT 25.5 (*)     INR 2.41 (*)     All other components within normal limits    Narrative:     Indicate which anticoagulants the patient is on:->UNKNOWN   ESTIMATED GFR - Abnormal; Notable for the following components:    GFR If  56 (*)     GFR If Non  47 (*)     All other components within normal limits   TROPONIN - Abnormal; Notable for the following components:    Troponin T 22 (*)     All other components within normal limits   LIPASE   DIFFERENTIAL MANUAL   PERIPHERAL SMEAR REVIEW   PLATELET ESTIMATE   MORPHOLOGY        EKG  Results for orders placed or performed during the hospital encounter of 21   EKG   Result Value Ref Range    Report       [facility] Emergency Dept.    Test Date:  2021  Pt Name:    ADELINA MILLAN                Department: ER  MRN:        [id number]                      Room:       [facility]  Gender:     Female                       Technician: [id number]  :        1975                   Requested By:ER TRIAGE PROTOCOL  Order #:    [id number]                    Shar MD: Ciro Malone    Measurements  Intervals                                Axis  Rate:       77                           P:          71  WA:          136                          QRS:        53  QRSD:       98                           T:          7  QT:         372  QTc:        421    Interpretive Statements  SINUS RHYTHM  RSR' IN V1 OR V2, RIGHT VCD OR RVH  BASELINE WANDER IN LEAD(S) V4  Compared to ECG 10/11/2021 03:37:04  Right ventricular hypertrophy now present  RSR' in V1 or V2 now present  Sinus bradycardia no longer present  Atrial premature complex(es) no longer present  Electronically Signed On 11- 21:2 1:28 PST by Ciro Malone          COURSE & MEDICAL DECISION MAKING  Pertinent Labs & Imaging studies reviewed. (See chart for details)    The patient presented with symptoms concerning for cardiac chest pain. The EKG was not ischemic and the patient's initial workup was negative for MI, pneumothorax, heart failure.  Patient has an elevated troponin at baseline, and is at her baseline.  Patient is therapeutic on her INR, the patient's symptoms, labs and vital signs are not consistent with a pulmonary embolism.  Low suspicion for breakthrough coagulation.  The chest x-ray and symptoms are not suggestive of an aortic dissection. The patient was observed and 2nd troponin performed, which ruled out MI.  Given the patient's tenderness, CAT scan performed, this demonstrates no acute processes.  Labs are otherwise reassuring for signs of infection.  Patient tolerating oral intake in the emergency department.  She is somnolent, however on awoken she reports significant pain.  Unclear the etiology of her pain is GI cocktail did not improve her symptoms.  At this time, I do not find a dangerous cause.  She is advised to follow-up with her regular doctor.    HEART Score: 3    The patient was given return precautions, anticipatory guidance, and the opportunity to ask questions prior to discharge.     Appropriate PPE were worn at this encounter.     FINAL IMPRESSION  1. Chest pain, unspecified type    2. Non-intractable vomiting with nausea, unspecified  vomiting type           DISPOSITION:  Patient will be discharged home in stable condition.    FOLLOW UP:  SHANKAR Gleason  [address] [facility] [id number] [phone]    Schedule an appointment as soon as possible for a visit       [facility], Emergency Dept  [address] [id number] [phone]    If symptoms worsen      OUTPATIENT MEDICATIONS:  New Prescriptions    ONDANSETRON (ZOFRAN ODT) 4 MG TABLET DISPERSIBLE    Take 1 Tablet by mouth every 8 hours as needed for Nausea.         This dictation was created using voice recognition software. The accuracy of the dictation is limited to the abilities of the software. I expect there may be some errors of grammar and possibly content. The nursing notes were reviewed and certain aspects of this information were incorporated into this note.

## 2021-11-28 NOTE — ED NOTES
Pt sleeping at this time  Wakes up when asked questions but goes back to sleep after.  In no apparent distress  Saturating 94% with O2 at 3L/NC.

## 2021-11-28 NOTE — ED NOTES
Pt sleeping in bed, pt wakes to verbal stimulus, pt told POC to discharge, social work assisting pt with ride home, pt is now awake/alert, GCS 15, NAD, VSS on baseline 2L NC.

## 2021-11-28 NOTE — ED NOTES
Pt to CT and back to room in stable condition. Pt still c/o chest pain 9/10. Pt medicated as per MAR.

## 2021-11-28 NOTE — ED NOTES
Pt sleeping but easily arousable with verbal command. Pt stated chest pain is now at 8/10. Pt is in no apparent distress with stable VS. Will continue to monitor pt.

## 2021-11-28 NOTE — ED NOTES
Rounded on patient. Patient still sleeping at this time.   No signs of distress noted.   Call light within easy reach.

## 2021-11-28 NOTE — ED NOTES
Repeat troponin obtained as per order, labeled at bedside with 2 identifiers, and sent to lab.    Pt resting quietly in cart. No apparent distress noted at this time. VS stable and will continue to monitor pt.

## 2021-11-28 NOTE — ED NOTES
Rounded on patient. Patient resting in bed. Still unable to keep eyes open.   No signs of distress noted. Equal chest rise and fall.  No further needs at this time. Call light within reach.

## 2021-11-28 NOTE — ED NOTES
Report received from Faiza GREENE  Pt sleeping at this time. In no apparent distress  Plan for discharge once patient is more awake and at baseline

## 2021-11-28 NOTE — ED NOTES
"Pt resting quietly in cart but stated chest pain is still at 9/10 with medications given. Pt stated nausea is \"a little bit better.\" ERP notified.  "

## 2021-11-28 NOTE — ED TRIAGE NOTES
"Lorene Haro  46 y.o. female  Chief Complaint   Patient presents with   • Chest Pain     Onset 1930 9/10 substernal to epigastric squeezing dull pain unrelieved by home nitro x 2   • Vomiting       Pt BIB EMS for above complaint. Per EMS \"patient came from home, we did not give aspirin because pain was not relieved by nitro and patient has been vomiting and spitting constantly.\"    Pt is alert and oriented, speaking in full sentences, follows commands and responds appropriately to questions. Resp are even and unlabored. This RN masked and in appropriate PPE during encounter.     Vitals:    11/27/21 2109   BP: 123/51   Pulse: 80   Resp: (!) 22   Temp: 36.9 °C (98.5 °F)   SpO2: 98%     "

## 2021-12-01 ENCOUNTER — ANTICOAGULATION VISIT (OUTPATIENT)
Dept: VASCULAR LAB | Facility: MEDICAL CENTER | Age: 46
End: 2021-12-01
Attending: INTERNAL MEDICINE
Payer: MEDICAID

## 2021-12-01 VITALS — HEART RATE: 65 BPM | DIASTOLIC BLOOD PRESSURE: 65 MMHG | SYSTOLIC BLOOD PRESSURE: 118 MMHG

## 2021-12-01 DIAGNOSIS — D68.69 SECONDARY HYPERCOAGULABLE STATE (HCC): ICD-10-CM

## 2021-12-01 DIAGNOSIS — I48.11 LONGSTANDING PERSISTENT ATRIAL FIBRILLATION (HCC): ICD-10-CM

## 2021-12-01 LAB
INR BLD: 1.9 (ref 0.9–1.2)
INR PPP: 1.9 (ref 2–3.5)

## 2021-12-01 PROCEDURE — 99212 OFFICE O/P EST SF 10 MIN: CPT | Performed by: NURSE PRACTITIONER

## 2021-12-01 PROCEDURE — 85610 PROTHROMBIN TIME: CPT

## 2021-12-01 NOTE — PROGRESS NOTES
Anticoagulation Summary  As of 2021    INR goal:  2.0-3.0   TTR:  31.2 % (7 mo)   INR used for dosin.90 (2021)   Warfarin maintenance plan:  6 mg (6 mg x 1) every Mon, Wed, Fri; 9 mg (6 mg x 1.5) all other days   Weekly warfarin total:  54 mg   Plan last modified:  SHANKAR Zhou (2021)   Next INR check:  2021   Target end date:  Indefinite    Indications    Atrial fibrillation (HCC) [I48.91]  Secondary hypercoagulable state (HCC) [D68.69]             Anticoagulation Episode Summary     INR check location:      Preferred lab:      Send INR reminders to:      Comments:                  Refer to Patient Findings for HPI:  Patient Findings     Positives:  Emergency department visit    Negatives:  Signs/symptoms of thrombosis, Signs/symptoms of bleeding, Laboratory test error suspected, Change in health, Change in alcohol use, Change in activity, Upcoming invasive procedure, Upcoming dental procedure, Missed doses, Extra doses, Change in medications, Change in diet/appetite, Hospital admission, Bruising, Other complaints    Comments:  Went to the ER on Saturday for CP and vomiting. Work up neg. Given GI cocktail and discharged. INR 2.4 in ER.          Vitals:    21 1009   BP: 118/65   Pulse: 65       Verified current warfarin dosing schedule.    Medications reconciled   Pt is not on antiplatelet therapy      A/P   INR  slightly sub-therapeutic despite dose increase.     Warfarin dosing recommendation: Began slightly increased weekly regimen as outlined on calendar.    Pt educated to contact our clinic with any changes in medications or s/s of bleeding or thrombosis. Pt is aware to seek immediate medical attention for falls, head injury or deep cuts.    Follow up appointment in 1 week(s).    SHANKAR Zhou

## 2021-12-08 ENCOUNTER — ANTICOAGULATION VISIT (OUTPATIENT)
Dept: VASCULAR LAB | Facility: MEDICAL CENTER | Age: 46
End: 2021-12-08
Attending: INTERNAL MEDICINE
Payer: MEDICAID

## 2021-12-08 DIAGNOSIS — D68.69 SECONDARY HYPERCOAGULABLE STATE (HCC): ICD-10-CM

## 2021-12-08 DIAGNOSIS — I48.11 LONGSTANDING PERSISTENT ATRIAL FIBRILLATION (HCC): ICD-10-CM

## 2021-12-08 LAB — INR PPP: 1.9 (ref 2–3.5)

## 2021-12-08 PROCEDURE — 85610 PROTHROMBIN TIME: CPT

## 2021-12-08 PROCEDURE — 99212 OFFICE O/P EST SF 10 MIN: CPT | Performed by: NURSE PRACTITIONER

## 2021-12-08 NOTE — PROGRESS NOTES
Anticoagulation Summary  As of 2021    INR goal:  2.0-3.0   TTR:  30.2 % (7.2 mo)   INR used for dosin.90 (2021)   Warfarin maintenance plan:  6 mg (6 mg x 1) every Mon, Wed, Fri; 9 mg (6 mg x 1.5) all other days   Weekly warfarin total:  54 mg   Plan last modified:  LESTER ZhouPDESIREE (2021)   Next INR check:  12/15/2021   Target end date:  Indefinite    Indications    Atrial fibrillation (HCC) [I48.91]  Secondary hypercoagulable state (HCC) [D68.69]             Anticoagulation Episode Summary     INR check location:      Preferred lab:      Send INR reminders to:      Comments:               Refer to Patient Findings for HPI:  Patient Findings     Positives:  Missed doses    Negatives:  Signs/symptoms of thrombosis, Signs/symptoms of bleeding, Laboratory test error suspected, Change in health, Change in alcohol use, Change in activity, Upcoming invasive procedure, Emergency department visit, Upcoming dental procedure, Extra doses, Change in medications, Change in diet/appetite, Hospital admission, Bruising, Other complaints          There were no vitals filed for this visit.   pt declined vitals    Verified current warfarin dosing schedule. She missed her dose yesterday. We talked about setting a cell phone alarm as a reminder. She will consider.    Medications reconciled   Pt is not on antiplatelet therapy      A/P   INR  slightly sub-therapeutic. Low CHADS 2 score.    Warfarin dosing recommendation: Take 9 mg tonight then resume previous regimen. Do NOT miss any doses of warfarin!    Pt educated to contact our clinic with any changes in medications or s/s of bleeding or thrombosis. Pt is aware to seek immediate medical attention for falls, head injury or deep cuts.    Follow up appointment in 1 week(s).    SHANKAR Zhou

## 2021-12-09 LAB — INR BLD: 1.9 (ref 0.9–1.2)

## 2021-12-15 ENCOUNTER — ANTICOAGULATION VISIT (OUTPATIENT)
Dept: VASCULAR LAB | Facility: MEDICAL CENTER | Age: 46
End: 2021-12-15
Attending: INTERNAL MEDICINE
Payer: MEDICAID

## 2021-12-15 DIAGNOSIS — D68.69 SECONDARY HYPERCOAGULABLE STATE (HCC): ICD-10-CM

## 2021-12-15 LAB
INR BLD: 3.8 (ref 0.9–1.2)
INR PPP: 3.8 (ref 2–3.5)

## 2021-12-15 PROCEDURE — 85610 PROTHROMBIN TIME: CPT

## 2021-12-15 PROCEDURE — 99212 OFFICE O/P EST SF 10 MIN: CPT

## 2021-12-15 NOTE — PROGRESS NOTES
Anticoagulation Summary  As of 12/15/2021    INR goal:  2.0-3.0   TTR:  30.9 % (7.5 mo)   INR used for dosing:  3.80 (12/15/2021)   Warfarin maintenance plan:  6 mg (6 mg x 1) every Mon, Wed, Fri; 9 mg (6 mg x 1.5) all other days   Weekly warfarin total:  54 mg   Plan last modified:  Sharri Patel A.P.NOmar (12/1/2021)   Next INR check:  12/22/2021   Target end date:  Indefinite    Indications    Atrial fibrillation (HCC) [I48.91]  Secondary hypercoagulable state (HCC) [D68.69]             Anticoagulation Episode Summary     INR check location:      Preferred lab:      Send INR reminders to:      Comments:                  Refer to Patient Findings for HPI:  Patient Findings     Positives:  Change in diet/appetite (Decreased appetite due to illness this week)    Negatives:  Signs/symptoms of thrombosis, Signs/symptoms of bleeding, Laboratory test error suspected, Change in health, Change in alcohol use, Change in activity, Upcoming invasive procedure, Emergency department visit, Upcoming dental procedure, Missed doses, Extra doses, Change in medications, Hospital admission, Bruising, Other complaints          There were no vitals filed for this visit.  pt declined vitals    Verified current warfarin dosing schedule.    Medications reconciled   Pt is not on antiplatelet therapy      A/P   INR  supra-therapeutic most likely due to decreased vitamin K intake.     Warfarin dosing recommendation: HOLD warfarin 12/15/21    Pt educated to contact our clinic with any changes in medications or s/s of bleeding or thrombosis. Pt is aware to seek immediate medical attention for falls, head injury or deep cuts.    Follow up appointment in 1 week(s).    Kurt Cai, PharmD

## 2021-12-16 ENCOUNTER — OFFICE VISIT (OUTPATIENT)
Dept: SLEEP MEDICINE | Facility: MEDICAL CENTER | Age: 46
End: 2021-12-16
Payer: MEDICAID

## 2021-12-16 VITALS
BODY MASS INDEX: 53.81 KG/M2 | SYSTOLIC BLOOD PRESSURE: 120 MMHG | RESPIRATION RATE: 16 BRPM | HEIGHT: 61 IN | OXYGEN SATURATION: 91 % | HEART RATE: 76 BPM | DIASTOLIC BLOOD PRESSURE: 80 MMHG | WEIGHT: 285 LBS

## 2021-12-16 DIAGNOSIS — J96.11 CHRONIC RESPIRATORY FAILURE WITH HYPOXIA AND HYPERCAPNIA (HCC): ICD-10-CM

## 2021-12-16 DIAGNOSIS — J96.12 CHRONIC RESPIRATORY FAILURE WITH HYPOXIA AND HYPERCAPNIA (HCC): ICD-10-CM

## 2021-12-16 DIAGNOSIS — R06.2 WHEEZE: ICD-10-CM

## 2021-12-16 DIAGNOSIS — E66.01 CLASS 3 SEVERE OBESITY DUE TO EXCESS CALORIES WITH SERIOUS COMORBIDITY AND BODY MASS INDEX (BMI) OF 50.0 TO 59.9 IN ADULT (HCC): ICD-10-CM

## 2021-12-16 DIAGNOSIS — Z72.0 TOBACCO USE: ICD-10-CM

## 2021-12-16 DIAGNOSIS — G47.33 OSA AND COPD OVERLAP SYNDROME (HCC): ICD-10-CM

## 2021-12-16 DIAGNOSIS — J44.9 OSA AND COPD OVERLAP SYNDROME (HCC): ICD-10-CM

## 2021-12-16 PROCEDURE — 99214 OFFICE O/P EST MOD 30 MIN: CPT | Performed by: PHYSICIAN ASSISTANT

## 2021-12-16 RX ORDER — BUDESONIDE AND FORMOTEROL FUMARATE DIHYDRATE 160; 4.5 UG/1; UG/1
2 AEROSOL RESPIRATORY (INHALATION) 2 TIMES DAILY
Qty: 3 EACH | Refills: 3 | Status: SHIPPED | OUTPATIENT
Start: 2021-12-16 | End: 2022-08-26

## 2021-12-16 RX ORDER — MONTELUKAST SODIUM 10 MG/1
10 TABLET ORAL
Qty: 30 TABLET | Refills: 11 | Status: SHIPPED | OUTPATIENT
Start: 2021-12-16 | End: 2022-01-31

## 2021-12-16 RX ORDER — FLUTICASONE PROPIONATE 50 MCG
1-2 SPRAY, SUSPENSION (ML) NASAL 2 TIMES DAILY
Qty: 16 G | Refills: 3 | Status: SHIPPED | OUTPATIENT
Start: 2021-12-16 | End: 2022-02-28

## 2021-12-16 RX ORDER — TIOTROPIUM BROMIDE 18 UG/1
18 CAPSULE ORAL; RESPIRATORY (INHALATION) DAILY
Qty: 30 CAPSULE | Refills: 3 | Status: SHIPPED | OUTPATIENT
Start: 2021-12-16 | End: 2022-08-26

## 2021-12-16 ASSESSMENT — ENCOUNTER SYMPTOMS
WHEEZING: 1
INSOMNIA: 1
TINGLING: 0
ORTHOPNEA: 1
COUGH: 0
FEVER: 0
WEIGHT LOSS: 0
SPUTUM PRODUCTION: 1
SINUS PAIN: 1
CHILLS: 0
SHORTNESS OF BREATH: 1
PALPITATIONS: 0
HEADACHES: 1
SORE THROAT: 1
TREMORS: 0
DIZZINESS: 0
HEARTBURN: 1

## 2021-12-16 ASSESSMENT — FIBROSIS 4 INDEX: FIB4 SCORE: 0.49

## 2021-12-16 NOTE — PATIENT INSTRUCTIONS
1-booster for covid 19 received yesterday  2-off CPAP x 1 year, increased daytime symptoms   3-needing albuterol appox twice month  4-using oxygen at 3L at home   5-reviewed portable oxygen conserving device yse   6-to use oxygen at 3L at night  7-call home number with recommendations

## 2021-12-16 NOTE — PROGRESS NOTES
CC: Follow-up COPD    HPI:  Lorene Haro is a 46 y.o. year old female here today for follow-up on chronic respiratory failure with hypoxia, obesity hypoventilation syndrome, severe persistent asthma.   Last seen in clinic 8/16/2021 by Dr. Lavern Waters.  Patient reports she was smoking a pack a day and is now down to 1 or 2 cigarettes a day since 8/9/2021, approximately 20 pack years.    Pertinent past medical history includes chronic back pain with sciatica, pneumonia due to COVID-19 in October, bipolar disorder, schizophrenia, anxiety, atrial fibrillation, iron deficiency anemia, secondary hypercoagulable state.  Patient reports not having used her CPAP in a year.  She also reports napping during the day and waking with headache.  Reviewed in clinic vitals including /80, HR 76, O2 sat 91% on 3 L and BMI of 5 3.85 kg/m².  She has had borderline to elevated troponin T levels for 3 months.    Reviewed home medication regimen including oxygen at 3 L, Symbicort 160, Flonase, Singulair, Spiriva HandiHaler, nicotine patch, trazodone, famotidine and albuterol.    Reviewed most recent imaging including chest x-ray obtained 11/27/2021 demonstrating no acute cardiopulmonary process.    Echocardiogram obtained 3/20/2021 demonstrated normal left ventricular chamber size, wall thickness and systolic function.  LVEF estimated 60%.  Normal diastolic function.  Normal right ventricle size and function, trace tricuspid regurgitation, unable to estimate pulmonary artery pressures due to inadequate tricuspid regurgitant jet.    Pulmonary function testing obtained 9/21/2021 demonstrated an FEV1 of 1.24 L or 48% predicted, FVC of 1.45 L of 46% predicted, FEV1/FVC ratio of 86 suggesting restriction.  Significant bronchodilator response with improvement of 12% in FEV1 postbronchodilator.  Total lung capacity is just below the lower limits of normal at 78% predicted, diffusion capacity within normal limits at 85% predicted.   Per pulmonologist interpretation mixed restrictive obstructive defect with significant bronchodilator response, could be consistent with COPD and patient with morbid obesity.    Last polysomnogram obtained 2/25/2019 demonstrated severe obstructive sleep apnea/hypopnea syndrome with ineffective titration.  Overnight oximetry obtained 8/1/2019 demonstrated adequate O2 saturations on AVAPS: IPAP 25 cm H2O pressure and EPAP 11 cm H2O pressure, with low O2 sat of 88%, mean O2 sat 90% and baseline 92%.  DARIELA of 5.1/h with 2 L O2 bleed in.    Review of Systems   Constitutional: Positive for malaise/fatigue. Negative for chills, fever and weight loss.   HENT: Positive for congestion, sinus pain and sore throat (rarely ). Negative for hearing loss, nosebleeds and tinnitus.    Eyes:        Needs eyeglasses   Respiratory: Positive for sputum production (clear to yellow when sick, none at present), shortness of breath (with activity ) and wheezing. Negative for cough.    Cardiovascular: Positive for chest pain, orthopnea and leg swelling. Negative for palpitations.   Gastrointestinal: Positive for heartburn.        No dentures, some difficulty swallowing pills    Neurological: Positive for headaches. Negative for dizziness, tingling and tremors.   Psychiatric/Behavioral: The patient has insomnia (occasional).        Past Medical History:   Diagnosis Date   • Asthma    • Bipolar 2 disorder (HCC)    • Chickenpox    • Chronic obstructive pulmonary disease (HCC)    • Hypertension    • On home oxygen therapy    • Other psychological stress    • Schizophrenic disorder (HCC)    • Yeast infection of the skin 4/1/2021       Past Surgical History:   Procedure Laterality Date   • CHOLECYSTECTOMY     • COLECTOMY     • HYSTERECTOMY LAPAROSCOPY     • KNEE ARTHROSCOPY Left        Family History   Problem Relation Age of Onset   • No Known Problems Mother    • Cancer Sister        Social History     Socioeconomic History   • Marital status:  Single     Spouse name: Not on file   • Number of children: Not on file   • Years of education: Not on file   • Highest education level: Not on file   Occupational History   • Not on file   Tobacco Use   • Smoking status: Current Every Day Smoker     Packs/day: 0.50     Years: 20.00     Pack years: 10.00     Types: Cigarettes     Last attempt to quit: 2021     Years since quittin.3   • Smokeless tobacco: Never Used   Vaping Use   • Vaping Use: Never used   Substance and Sexual Activity   • Alcohol use: No   • Drug use: No   • Sexual activity: Not Currently   Other Topics Concern   • Not on file   Social History Narrative    From [place]     Social Determinants of Health     Financial Resource Strain: Medium Risk   • Difficulty of Paying Living Expenses: Somewhat hard   Food Insecurity: Food Insecurity Present   • Worried About Running Out of Food in the Last Year: Sometimes true   • Ran Out of Food in the Last Year: Sometimes true   Transportation Needs: No Transportation Needs   • Lack of Transportation (Medical): No   • Lack of Transportation (Non-Medical): No   Physical Activity:    • Days of Exercise per Week: Not on file   • Minutes of Exercise per Session: Not on file   Stress:    • Feeling of Stress : Not on file   Social Connections:    • Frequency of Communication with Friends and Family: Not on file   • Frequency of Social Gatherings with Friends and Family: Not on file   • Attends Orthodox Services: Not on file   • Active Member of Clubs or Organizations: Not on file   • Attends Club or Organization Meetings: Not on file   • Marital Status: Not on file   Intimate Partner Violence:    • Fear of Current or Ex-Partner: Not on file   • Emotionally Abused: Not on file   • Physically Abused: Not on file   • Sexually Abused: Not on file   Housing Stability:    • Unable to Pay for Housing in the Last Year: Not on file   • Number of Places Lived in the Last Year: Not on file   • Unstable Housing in the  "Last Year: Not on file       Allergies as of 12/16/2021 - Reviewed 12/16/2021   Allergen Reaction Noted   • Amoxicillin Rash and Itching 09/11/2007   • Penicillin g Rash and Itching 10/20/2020        @Vital signs for this encounter:  /80   Pulse 76   Resp 16   Ht 1.549 m (5' 1\")   Wt (!) 129 kg (285 lb)   SpO2 91%     Current medications as of today   Current Outpatient Medications   Medication Sig Dispense Refill   • warfarin (COUMADIN) 6 MG Tab Take one to one and one-half (1-1.5) tablets daily as directed by [facility] 135 Tablet 1   • ondansetron (ZOFRAN ODT) 4 MG TABLET DISPERSIBLE Take 1 Tablet by mouth every 8 hours as needed for Nausea. 10 Tablet 0   • ondansetron (ZOFRAN) 4 MG Tab tablet Take 1 Tablet by mouth every four hours as needed for Nausea/Vomiting. 20 Tablet 0   • acetaminophen (TYLENOL) 325 MG Tab Take 325 mg by mouth every 8 hours as needed (Generalized pain). Prescription is for 4 tabs every 8 hours prn, patient states she takes 2 tabls every 4 hours prn     • budesonide-formoterol (SYMBICORT) 160-4.5 MCG/ACT Aerosol Inhale 2 Puffs 2 times a day. 10.2 g 0   • nicotine (NICODERM) 14 MG/24HR PATCH 24 HR Place 1 Patch on the skin every 24 hours. 30 Patch 0   • atorvastatin (LIPITOR) 20 MG Tab Take 20 mg by mouth every day.     • NON SPECIFIED Apply 1 Application topically 1 time a day as needed (Rash under breasts). Anti-fungal powder     • traZODone (DESYREL) 50 MG Tab Take  mg by mouth every day.     • fluticasone (FLONASE) 50 MCG/ACT nasal spray Administer 1-2 Sprays into affected nostril(S) 2 times a day. Patient states administers 2 sprays twice daily     • famotidine (PEPCID) 40 MG Tab Take 40 mg by mouth every day. Indications: Heartburn     • nitroglycerin (NITROSTAT) 0.4 MG SL Tab Place 1 tablet under the tongue as needed for Chest Pain. 25 tablet 0   • albuterol 108 (90 Base) MCG/ACT Aero Soln inhalation aerosol Inhale 2 Puffs every 6 hours as needed " for Shortness of Breath. 8.5 g 0   • montelukast (SINGULAIR) 10 MG Tab Take 1 Tab by mouth every bedtime. 30 Tab 11   • tiotropium (SPIRIVA HANDIHALER) 18 MCG Cap Inhale 1 Cap by mouth every day. 30 Cap 3   • aripiprazole (ABILIFY) 30 MG tablet Take 30 mg by mouth every morning.     • Home Care Oxygen Inhale 2 L/min as needed. Oxygen is at 2L/NC PRN and QHS  Oxygen dose range: 2 L/min  Respiratory route via: Nasal Cannula   Oxygen supplier:[employer]           No current facility-administered medications for this visit.         Physical Exam:   Gen:           Alert and oriented, No apparent distress. Mood and affect     appropriate, normal interaction with provider.  Eyes:          sclere white, conjunctive moist.  Hearing:     Grossly intact.  Dentition:    fair dentition.  Oropharynx:   Tongue normal, posterior pharynx without erythema or exudate.  Neck:        Supple, trachea midline, no masses.  Respiratory Effort: No intercostal retractions or use of accessory muscles.   Lung Auscultation:      Decreased throughout; no rales, rhonchi or wheezing.  CV:            Regular rate and rhythm.  Mild lower extremity edema. No murmurs, rubs or gallops.  Digits, Nails, Ext: No clubbing, cyanosis, petechiae, or nodes.   Skin:        No rashes, lesions or ulcers noted on exposed skin  surfaces.       Gait and station:          Requires walker                Assessment:  1. KATHIA and COPD overlap syndrome ([facility])     2. Chronic respiratory failure with hypoxia and hypercapnia ([facility])     3. Wheeze  montelukast (SINGULAIR) 10 MG Tab   4. Class 3 severe obesity due to excess calories with serious comorbidity and body mass index (BMI) of 50.0 to 59.9 in adult ([facility])     5. Tobacco use         Immunizations:    Flu: 12/8/2021  Pneumovax 23: 11/20/2018   Prevnar 13: Deferred  Pfizer SARS-CoV-2 vaccine: 5/24/2021, 4/22/2021    Plan:  46 y.o. year old female here today for follow-up on chronic respiratory failure with hypoxia, obesity  hypoventilation syndrome, severe persistent asthma. Last seen in clinic 8/16/2021 by Dr. Lavern Waters.  Patient reports she was smoking a pack a day and is now down to 1 or 2 cigarettes a day since 8/9/2021, approximately 20 pack years.    Tobacco use: Patient reports much decreased, complete smoking cessation is advised.    Pertinent past medical history includes chronic back pain with sciatica, pneumonia due to COVID-19 in October, bipolar disorder, schizophrenia, anxiety, atrial fibrillation, iron deficiency anemia, secondary hypercoagulable state.  Patient reports not having used her CPAP in a year.  She also reports napping during the day and waking with headache.  Reviewed in clinic vitals including /80, HR 76, O2 sat 91% on 3 L and BMI of 5 3.85 kg/m².  She has had borderline to elevated troponin T levels for 3 months.    Reviewed home medication regimen including oxygen at 3 L, Symbicort 160, Flonase, Singulair, Spiriva HandiHaler, nicotine patch, trazodone, famotidine and albuterol.    Chronic respiratory failure with hypoxia: Continues to use and benefit from 3 L of oxygen.      Asthma/KATHIA overlap: Reviewed current regimen, refills provided.  Requiring albuterol approximately twice per month.  Patient reports not using her CPAP x1 year due to losing her equipment with increased daytime symptoms.  Recommend she restart her CPAP use.  Follow-up with sleep medicine.    Follow-up in 3 months with sleep provider, may need follow-up repeat polysomnogram.      This dictation was created using voice recognition software. The accuracy of the dictation is limited to the abilities of the software. I expect there may be some errors of grammar and possibly content.

## 2021-12-19 ENCOUNTER — APPOINTMENT (OUTPATIENT)
Dept: RADIOLOGY | Facility: MEDICAL CENTER | Age: 46
End: 2021-12-19
Attending: EMERGENCY MEDICINE
Payer: MEDICAID

## 2021-12-19 ENCOUNTER — HOSPITAL ENCOUNTER (EMERGENCY)
Facility: MEDICAL CENTER | Age: 46
End: 2021-12-19
Attending: EMERGENCY MEDICINE
Payer: MEDICAID

## 2021-12-19 VITALS
SYSTOLIC BLOOD PRESSURE: 149 MMHG | OXYGEN SATURATION: 96 % | WEIGHT: 283 LBS | RESPIRATION RATE: 19 BRPM | BODY MASS INDEX: 53.43 KG/M2 | TEMPERATURE: 98.2 F | HEART RATE: 78 BPM | DIASTOLIC BLOOD PRESSURE: 65 MMHG | HEIGHT: 61 IN

## 2021-12-19 DIAGNOSIS — R79.1 SUPRATHERAPEUTIC INR: ICD-10-CM

## 2021-12-19 DIAGNOSIS — M54.32 LEFT SIDED SCIATICA: ICD-10-CM

## 2021-12-19 DIAGNOSIS — B37.2 CANDIDIASIS, CUTANEOUS: ICD-10-CM

## 2021-12-19 DIAGNOSIS — S39.012A BACK STRAIN, INITIAL ENCOUNTER: ICD-10-CM

## 2021-12-19 LAB
ALBUMIN SERPL BCP-MCNC: 3.9 G/DL (ref 3.2–4.9)
ALBUMIN/GLOB SERPL: 1.3 G/DL
ALP SERPL-CCNC: 133 U/L (ref 30–99)
ALT SERPL-CCNC: 10 U/L (ref 2–50)
ANION GAP SERPL CALC-SCNC: 6 MMOL/L (ref 7–16)
ANISOCYTOSIS BLD QL SMEAR: ABNORMAL
AST SERPL-CCNC: 11 U/L (ref 12–45)
BASOPHILS # BLD AUTO: 0 % (ref 0–1.8)
BASOPHILS # BLD: 0 K/UL (ref 0–0.12)
BILIRUB SERPL-MCNC: 0.4 MG/DL (ref 0.1–1.5)
BUN SERPL-MCNC: 11 MG/DL (ref 8–22)
CALCIUM SERPL-MCNC: 9.3 MG/DL (ref 8.5–10.5)
CHLORIDE SERPL-SCNC: 104 MMOL/L (ref 96–112)
CO2 SERPL-SCNC: 30 MMOL/L (ref 20–33)
CREAT SERPL-MCNC: 0.76 MG/DL (ref 0.5–1.4)
EOSINOPHIL # BLD AUTO: 0.09 K/UL (ref 0–0.51)
EOSINOPHIL NFR BLD: 0.9 % (ref 0–6.9)
ERYTHROCYTE [DISTWIDTH] IN BLOOD BY AUTOMATED COUNT: 52.3 FL (ref 35.9–50)
GLOBULIN SER CALC-MCNC: 3 G/DL (ref 1.9–3.5)
GLUCOSE SERPL-MCNC: 117 MG/DL (ref 65–99)
HCT VFR BLD AUTO: 36 % (ref 37–47)
HGB BLD-MCNC: 10.7 G/DL (ref 12–16)
INR PPP: 3.53 (ref 0.87–1.13)
LYMPHOCYTES # BLD AUTO: 1.09 K/UL (ref 1–4.8)
LYMPHOCYTES NFR BLD: 11 % (ref 22–41)
MACROCYTES BLD QL SMEAR: ABNORMAL
MANUAL DIFF BLD: NORMAL
MCH RBC QN AUTO: 24.8 PG (ref 27–33)
MCHC RBC AUTO-ENTMCNC: 29.7 G/DL (ref 33.6–35)
MCV RBC AUTO: 83.5 FL (ref 81.4–97.8)
MICROCYTES BLD QL SMEAR: ABNORMAL
MONOCYTES # BLD AUTO: 1.01 K/UL (ref 0–0.85)
MONOCYTES NFR BLD AUTO: 10.2 % (ref 0–13.4)
MORPHOLOGY BLD-IMP: NORMAL
NEUTROPHILS # BLD AUTO: 7.63 K/UL (ref 2–7.15)
NEUTROPHILS NFR BLD: 77.1 % (ref 44–72)
NRBC # BLD AUTO: 0 K/UL
NRBC BLD-RTO: 0 /100 WBC
OVALOCYTES BLD QL SMEAR: NORMAL
PLATELET # BLD AUTO: 268 K/UL (ref 164–446)
PLATELET BLD QL SMEAR: NORMAL
PMV BLD AUTO: 10.9 FL (ref 9–12.9)
POIKILOCYTOSIS BLD QL SMEAR: NORMAL
POLYCHROMASIA BLD QL SMEAR: NORMAL
POTASSIUM SERPL-SCNC: 3.7 MMOL/L (ref 3.6–5.5)
PROMYELOCYTES NFR BLD MANUAL: 0.8 %
PROT SERPL-MCNC: 6.9 G/DL (ref 6–8.2)
PROTHROMBIN TIME: 34.3 SEC (ref 12–14.6)
RBC # BLD AUTO: 4.31 M/UL (ref 4.2–5.4)
RBC BLD AUTO: PRESENT
SODIUM SERPL-SCNC: 140 MMOL/L (ref 135–145)
WBC # BLD AUTO: 9.9 K/UL (ref 4.8–10.8)

## 2021-12-19 PROCEDURE — 85610 PROTHROMBIN TIME: CPT

## 2021-12-19 PROCEDURE — 85027 COMPLETE CBC AUTOMATED: CPT

## 2021-12-19 PROCEDURE — 99284 EMERGENCY DEPT VISIT MOD MDM: CPT

## 2021-12-19 PROCEDURE — 80053 COMPREHEN METABOLIC PANEL: CPT

## 2021-12-19 PROCEDURE — A9270 NON-COVERED ITEM OR SERVICE: HCPCS | Performed by: EMERGENCY MEDICINE

## 2021-12-19 PROCEDURE — 700102 HCHG RX REV CODE 250 W/ 637 OVERRIDE(OP): Performed by: EMERGENCY MEDICINE

## 2021-12-19 PROCEDURE — 72100 X-RAY EXAM L-S SPINE 2/3 VWS: CPT

## 2021-12-19 PROCEDURE — 85007 BL SMEAR W/DIFF WBC COUNT: CPT

## 2021-12-19 RX ORDER — HYDROCODONE BITARTRATE AND ACETAMINOPHEN 5; 325 MG/1; MG/1
1 TABLET ORAL ONCE
Status: COMPLETED | OUTPATIENT
Start: 2021-12-19 | End: 2021-12-19

## 2021-12-19 RX ORDER — CYCLOBENZAPRINE HCL 10 MG
10 TABLET ORAL 3 TIMES DAILY PRN
Qty: 12 TABLET | Refills: 0 | Status: SHIPPED | OUTPATIENT
Start: 2021-12-19 | End: 2021-12-23

## 2021-12-19 RX ADMIN — HYDROCODONE BITARTRATE AND ACETAMINOPHEN 1 TABLET: 5; 325 TABLET ORAL at 21:47

## 2021-12-19 ASSESSMENT — FIBROSIS 4 INDEX: FIB4 SCORE: 0.49

## 2021-12-20 NOTE — ED TRIAGE NOTES
"Chief Complaint   Patient presents with   • Low Back Pain     x3 weeks, worsening pain today while ambulating. 9/10 throbbing pain, denies any injury. still continent of bowel and bladder.    • Rash     under pannus, x1.5 weeks, hx of rashes        Pt used wc to triage. Pt A&Ox4, 3L O2 NC baseline for hx of COPD, pt BIB [facility], pt ambulated to wc when brought in. Has personal walker with her.     Pt to lobby . Pt educated on alerting staff in changes to condition. Pt verbalized understanding.     /77   Pulse 87   Temp 37.2 °C (98.9 °F) (Temporal)   Resp 16   Ht 1.549 m (5' 1\")   Wt (!) 128 kg (283 lb)   LMP  (LMP Unknown)   SpO2 96%   BMI 53.47 kg/m²   "

## 2021-12-20 NOTE — ED NOTES
Patient able to ambulate with use of wheeled walker. Gait steady, but slow. Pt provided with medicare # to call MTM

## 2021-12-20 NOTE — ED PROVIDER NOTES
ED Provider Note    CHIEF COMPLAINT  Chief Complaint   Patient presents with   • Low Back Pain     x3 weeks, worsening pain today while ambulating. 9/10 throbbing pain, denies any injury. still continent of bowel and bladder.    • Rash     under pannus, x1.5 weeks, hx of rashes        HPI  Lorene Haro is a 46 y.o. female who presents with acute on chronic lower back pain.  Has had low back pain for several weeks now though was worse today while ambulating when she suddenly had severe pain.  No bowel or bladder incontinence.  No history of injury to her back.  No recent falls.  Patient is morbidly obese at baseline and gets around with a walker.  She states that she had so much pain that her legs felt like it would give out.  She has pain with elevating her left lower extremity.  Has known history of left-sided sciatica.  Denies weakness in her lower extremities currently.    Patient does not have a history of immunocompromised state.    Patient also has rash under her pannus on the abdomen.  Consistent with candidal rash.  She is treating it with barrier cream.  No fevers.    Incidentally, the patient is on supplemental oxygen at 3 L nasal cannula.  Has a history of COPD.  This is baseline for her.    REVIEW OF SYSTEMS  See HPI for further details. All other systems are negative.     PAST MEDICAL HISTORY   has a past medical history of Asthma, Bipolar 2 disorder ([facility]), Chickenpox, Chronic obstructive pulmonary disease (HCC), Hypertension, On home oxygen therapy, Other psychological stress, Schizophrenic disorder ([facility]), and Yeast infection of the skin (2021).    SOCIAL HISTORY  Social History     Tobacco Use   • Smoking status: Light Tobacco Smoker     Years: 20.00     Types: Cigarettes     Last attempt to quit: 2021     Years since quittin.3   • Smokeless tobacco: Never Used   • Tobacco comment: 1 cigarette/day    Vaping Use   • Vaping Use: Never used   Substance and Sexual Activity   • Alcohol  "use: No   • Drug use: No   • Sexual activity: Not Currently       SURGICAL HISTORY   has a past surgical history that includes hysterectomy laparoscopy; colectomy; cholecystectomy; and knee arthroscopy (Left).    CURRENT MEDICATIONS  Home Medications     Reviewed by Nadia Skaggs R.N. (Registered Nurse) on 12/19/21 at 2047  Med List Status: Partial   Medication Last Dose Status   acetaminophen (TYLENOL) 325 MG Tab  Active   albuterol 108 (90 Base) MCG/ACT Aero Soln inhalation aerosol  Active   aripiprazole (ABILIFY) 30 MG tablet  Active   atorvastatin (LIPITOR) 20 MG Tab  Active   budesonide-formoterol (SYMBICORT) 160-4.5 MCG/ACT Aerosol  Active   famotidine (PEPCID) 40 MG Tab  Active   fluticasone (FLONASE) 50 MCG/ACT nasal spray  Active   Home Care Oxygen  Active   montelukast (SINGULAIR) 10 MG Tab  Active   nicotine (NICODERM) 14 MG/24HR PATCH 24 HR  Active   nitroglycerin (NITROSTAT) 0.4 MG SL Tab  Active   NON SPECIFIED  Active   ondansetron (ZOFRAN ODT) 4 MG TABLET DISPERSIBLE  Active   ondansetron (ZOFRAN) 4 MG Tab tablet  Active   tiotropium (SPIRIVA HANDIHALER) 18 MCG Cap  Active   traZODone (DESYREL) 50 MG Tab  Active   warfarin (COUMADIN) 6 MG Tab  Active                ALLERGIES  Allergies   Allergen Reactions   • Amoxicillin Rash and Itching     Tolerates cephalosporins, pt reports that she gets a rash all over her body and gets itchy   • Penicillin G Rash and Itching     pt reports that she gets a rash all over her body and gets itchy       PHYSICAL EXAM  VITAL SIGNS: /77   Pulse 87   Temp 37.2 °C (98.9 °F) (Temporal)   Resp 16   Ht 1.549 m (5' 1\")   Wt (!) 128 kg (283 lb)   LMP  (LMP Unknown)   SpO2 96%   BMI 53.47 kg/m²   Pulse ox interpretation: I interpret this pulse ox as normal.  Constitutional: Alert in no apparent distress.  HENT: No signs of trauma, Bilateral external ears normal, Nose normal.   Eyes: Conjunctiva normal, Non-icteric.   Neck: Normal range of motion, Supple, No " stridor.   Cardiovascular: Regular rate and rhythm.   Thorax & Lungs: Normal breath sounds, No respiratory distress, No wheezing, No chest tenderness.   Abdomen: Bowel sounds normal, Soft, No tenderness, No masses, No pulsatile masses. No peritoneal signs.  Skin: Warm, Dry, No erythema, candidal rash under the large pannus without significant surrounding erythema or tenderness or skin breakdown.  Back: Lower back tenderness, No CVA tenderness.   Extremities: Intact distal pulses, No edema, No tenderness, No cyanosis, venous stasis changes to the bilateral lower extremities  Musculoskeletal: No major deformities noted.   Neurologic: Alert, Normal motor function, Normal sensory function, No focal deficits noted.       DIAGNOSTIC STUDIES / PROCEDURES    LABS  Labs Reviewed   CBC WITH DIFFERENTIAL - Abnormal; Notable for the following components:       Result Value    Hemoglobin 10.7 (*)     Hematocrit 36.0 (*)     MCH 24.8 (*)     MCHC 29.7 (*)     RDW 52.3 (*)     Neutrophils-Polys 77.10 (*)     Lymphocytes 11.00 (*)     Neutrophils (Absolute) 7.63 (*)     Monos (Absolute) 1.01 (*)     Anisocytosis 2+ (*)     Microcytosis 2+ (*)     All other components within normal limits    Narrative:     Collected By:  Indicate which anticoagulants the patient is on:->COUMADIN   COMP METABOLIC PANEL - Abnormal; Notable for the following components:    Anion Gap 6.0 (*)     Glucose 117 (*)     AST(SGOT) 11 (*)     Alkaline Phosphatase 133 (*)     All other components within normal limits    Narrative:     Collected By:  Indicate which anticoagulants the patient is on:->COUMADIN   PROTHROMBIN TIME - Abnormal; Notable for the following components:    PT 34.3 (*)     INR 3.53 (*)     All other components within normal limits    Narrative:     Collected By:  Indicate which anticoagulants the patient is on:->COUMADIN   ESTIMATED GFR    Narrative:     Collected By:  Indicate which anticoagulants the patient is on:->COUMADIN    DIFFERENTIAL MANUAL    Narrative:     Collected By:  Indicate which anticoagulants the patient is on:->COUMADIN   PERIPHERAL SMEAR REVIEW    Narrative:     Collected By:  Indicate which anticoagulants the patient is on:->COUMADIN   PLATELET ESTIMATE    Narrative:     Collected By:  Indicate which anticoagulants the patient is on:->COUMADIN   MORPHOLOGY    Narrative:     Collected By:  Indicate which anticoagulants the patient is on:->COUMADIN         RADIOLOGY  DX-LUMBAR SPINE-2 OR 3 VIEWS   Final Result         No acute fracture or compression deformity is seen.      Minimal retrolisthesis of L4-5, likely degenerative.            COURSE & MEDICAL DECISION MAKING    Medications   HYDROcodone-acetaminophen (NORCO) 5-325 MG per tablet 1 Tablet (1 Tablet Oral Given 12/19/21 9812)       Pertinent Labs & Imaging studies reviewed. (See chart for details)  46 y.o. female presenting with acute on chronic lower back pain.  She states that she almost fell as a result of pain while she was walking today.  Denies any direct trauma to her back recently.  Has some pain with range of motion of her left lower extremity with a positive straight leg raise.  Has a history of left-sided sciatica.  X-ray of the lower back was ordered.  Patient is morbidly obese and there is potential for occult or pathologic fracture/compression injury.  X-ray was rather reassuring.    Laboratory studies were performed.  These were largely unremarkable except for supratherapeutic INR.  Patient is aware that her INR is elevated.  Last week she was told to skip a dose of Coumadin.  I recommended that she skip another dose of Coumadin and to resume her medication after that and to follow-up with the anticoagulation clinic for further evaluation.    With regards to the patient's back pain, she will be treated with Flexeril for further management.  No significant red flags for back pain.  No bowel or bladder incontinence.  Patient is able to ambulate with  "a wheeled walker.    The patient will not drink alcohol nor drive with prescribed medications.   The patient was instructed to follow-up with primary care physician for further management.  To return immediately for any worsening symptoms or development of any other concerning signs or symptoms. The patient verbalizes understanding in their own words.    /65   Pulse 78   Temp 36.8 °C (98.2 °F) (Temporal)   Resp 19   Ht 1.549 m (5' 1\")   Wt (!) 128 kg (283 lb)   LMP  (LMP Unknown)   SpO2 96%   BMI 53.47 kg/m²     The patient was referred to primary care where they will receive further BP management.      SHANKAR Gleason  [address] [place] [id number] [phone]    Schedule an appointment as soon as possible for a visit       [facility], Emergency Dept  [facility] [facility] [address] [phone]    As needed, If symptoms worsen      FINAL IMPRESSION  1. Back strain, initial encounter    2. Candidiasis, cutaneous    3. Left sided sciatica    4. Supratherapeutic INR            Electronically signed by: Graeme Salvador M.D., 12/19/2021 9:40 PM    "

## 2021-12-22 ENCOUNTER — ANTICOAGULATION VISIT (OUTPATIENT)
Dept: VASCULAR LAB | Facility: MEDICAL CENTER | Age: 46
End: 2021-12-22
Attending: INTERNAL MEDICINE
Payer: MEDICAID

## 2021-12-22 DIAGNOSIS — D68.69 SECONDARY HYPERCOAGULABLE STATE (HCC): ICD-10-CM

## 2021-12-22 LAB
INR BLD: 4.9 (ref 0.9–1.2)
INR PPP: 4.9 (ref 2–3.5)

## 2021-12-22 PROCEDURE — 99212 OFFICE O/P EST SF 10 MIN: CPT

## 2021-12-22 PROCEDURE — 85610 PROTHROMBIN TIME: CPT

## 2021-12-22 NOTE — PROGRESS NOTES
Anticoagulation Summary  As of 2021    INR goal:  2.0-3.0   TTR:  30.0 % (7.7 mo)   INR used for dosin.90 (2021)   Warfarin maintenance plan:  9 mg (6 mg x 1.5) every Tue, Thu; 6 mg (6 mg x 1) all other days   Weekly warfarin total:  48 mg   Plan last modified:  Kinjal Blake PharmD (2021)   Next INR check:  2021   Target end date:  Indefinite    Indications    Atrial fibrillation (HCC) [I48.91]  Secondary hypercoagulable state (HCC) [D68.69]             Anticoagulation Episode Summary     INR check location:      Preferred lab:      Send INR reminders to:      Comments:                  Refer to Patient Findings for HPI:  Patient Findings     Positives:  Change in diet/appetite (lack of appetite)    Negatives:  Signs/symptoms of thrombosis, Signs/symptoms of bleeding, Laboratory test error suspected, Change in health, Change in alcohol use, Change in activity, Upcoming invasive procedure, Emergency department visit, Upcoming dental procedure, Missed doses, Extra doses, Change in medications, Hospital admission, Bruising, Other complaints          There were no vitals filed for this visit.   pt declined vitals    Verified current warfarin dosing schedule.    Medications reconciled   Pt is not on antiplatelet therapy      A/P   INR  supra-therapeutic.     Warfarin dosing recommendation: Hold x 2 days then reduce weekly regimen    Pt educated to contact our clinic with any changes in medications or s/s of bleeding or thrombosis. Pt is aware to seek immediate medical attention for falls, head injury or deep cuts.    Follow up appointment in 1 week(s).    Kinjal Blake, Junior

## 2021-12-29 ENCOUNTER — ANTICOAGULATION VISIT (OUTPATIENT)
Dept: VASCULAR LAB | Facility: MEDICAL CENTER | Age: 46
End: 2021-12-29
Attending: INTERNAL MEDICINE
Payer: MEDICAID

## 2021-12-29 VITALS — DIASTOLIC BLOOD PRESSURE: 69 MMHG | HEART RATE: 79 BPM | SYSTOLIC BLOOD PRESSURE: 105 MMHG

## 2021-12-29 DIAGNOSIS — D68.69 SECONDARY HYPERCOAGULABLE STATE (HCC): ICD-10-CM

## 2021-12-29 LAB — INR PPP: 2.8 (ref 2–3.5)

## 2021-12-29 PROCEDURE — 99211 OFF/OP EST MAY X REQ PHY/QHP: CPT

## 2021-12-29 PROCEDURE — 85610 PROTHROMBIN TIME: CPT

## 2021-12-29 NOTE — PROGRESS NOTES
Anticoagulation Summary  As of 2021    INR goal:  2.0-3.0   TTR:  29.4 % (7.9 mo)   INR used for dosin.80 (2021)   Warfarin maintenance plan:  6 mg (6 mg x 1) every day   Weekly warfarin total:  42 mg   Plan last modified:  Kurt Cai PharmD (2021)   Next INR check:  2022   Target end date:  Indefinite    Indications    Atrial fibrillation (HCC) [I48.91]  Secondary hypercoagulable state (HCC) [D68.69]             Anticoagulation Episode Summary     INR check location:      Preferred lab:      Send INR reminders to:      Comments:                  Refer to Patient Findings for HPI:  Patient Findings     Negatives:  Signs/symptoms of thrombosis, Signs/symptoms of bleeding, Laboratory test error suspected, Change in health, Change in alcohol use, Change in activity, Upcoming invasive procedure, Emergency department visit, Upcoming dental procedure, Missed doses, Extra doses, Change in medications, Change in diet/appetite, Hospital admission, Bruising, Other complaints          Vitals:    21 1323   BP: 105/69   Pulse: 79     pt declined vitals    Verified current warfarin dosing schedule.    Medications reconciled  Pt is not on antiplatelet therapy      A/P   INR is-therapeutic.     Warfarin dosing recommendation: Will have patient start a new regimen of 6mg once a day.    Pt educated to contact our clinic with any changes in medications or s/s of bleeding or thrombosis. Pt is aware to seek immediate medical attention for falls, head injury or deep cuts.    Follow up appointment in 1 week(s).    Kurt Cai PharmD

## 2021-12-30 LAB — INR BLD: 2.8 (ref 0.9–1.2)

## 2022-01-03 ENCOUNTER — TELEPHONE (OUTPATIENT)
Dept: SLEEP MEDICINE | Facility: MEDICAL CENTER | Age: 47
End: 2022-01-03

## 2022-01-03 NOTE — TELEPHONE ENCOUNTER
MEDICATION PRIOR AUTHORIZATION NEEDED:    1. Name of Medication: Budesonide-Formoterol 160/4.5 mcg    2. Requested By (Name of Pharmacy): [facility]     3. Is insurance on file current? yes    4. What is the name & phone number of the 3rd party payor? Medicaid [phone]   Lesa Joshi(Attending)

## 2022-01-05 ENCOUNTER — APPOINTMENT (OUTPATIENT)
Dept: VASCULAR LAB | Facility: MEDICAL CENTER | Age: 47
End: 2022-01-05
Attending: INTERNAL MEDICINE
Payer: MEDICAID

## 2022-01-07 ENCOUNTER — ANTICOAGULATION VISIT (OUTPATIENT)
Dept: VASCULAR LAB | Facility: MEDICAL CENTER | Age: 47
End: 2022-01-07
Attending: INTERNAL MEDICINE
Payer: MEDICAID

## 2022-01-07 VITALS — HEART RATE: 81 BPM | SYSTOLIC BLOOD PRESSURE: 134 MMHG | DIASTOLIC BLOOD PRESSURE: 84 MMHG

## 2022-01-07 DIAGNOSIS — D68.69 SECONDARY HYPERCOAGULABLE STATE (HCC): ICD-10-CM

## 2022-01-07 LAB
INR BLD: 2.7 (ref 0.9–1.2)
INR PPP: 2.7 (ref 2–3.5)

## 2022-01-07 PROCEDURE — 99211 OFF/OP EST MAY X REQ PHY/QHP: CPT

## 2022-01-07 PROCEDURE — 85610 PROTHROMBIN TIME: CPT

## 2022-01-07 NOTE — PROGRESS NOTES
Anticoagulation Summary  As of 2022    INR goal:  2.0-3.0   TTR:  32.0 % (8.2 mo)   INR used for dosin.70 (2022)   Warfarin maintenance plan:  6 mg (6 mg x 1) every day   Weekly warfarin total:  42 mg   Plan last modified:  Kurt Cai PharmD (2021)   Next INR check:  2022   Target end date:  Indefinite    Indications    Atrial fibrillation (HCC) [I48.91]  Secondary hypercoagulable state (HCC) [D68.69]             Anticoagulation Episode Summary     INR check location:      Preferred lab:      Send INR reminders to:      Comments:                  Refer to Patient Findings for HPI:  Patient Findings     Negatives:  Signs/symptoms of thrombosis, Signs/symptoms of bleeding, Laboratory test error suspected, Change in health, Change in alcohol use, Change in activity, Upcoming invasive procedure, Emergency department visit, Upcoming dental procedure, Missed doses, Extra doses, Change in medications, Change in diet/appetite, Hospital admission, Bruising, Other complaints          Vitals:    22 1010   BP: 134/84   BP Location: Right arm   Patient Position: Sitting   BP Cuff Size: Large adult   Pulse: 81       Verified current warfarin dosing schedule.    Medications reconciled   Pt is not on antiplatelet therapy      A/P   INR  therapeutic.     Warfarin dosing recommendation: Instructed pt to continue on with current regimen.    Pt educated to contact our clinic with any changes in medications or s/s of bleeding or thrombosis. Pt is aware to seek immediate medical attention for falls, head injury or deep cuts.    Follow up appointment in 1.5 week(s).    Chris Kang, PharmD, BCACP

## 2022-01-19 ENCOUNTER — ANTICOAGULATION VISIT (OUTPATIENT)
Dept: VASCULAR LAB | Facility: MEDICAL CENTER | Age: 47
End: 2022-01-19
Attending: INTERNAL MEDICINE
Payer: MEDICAID

## 2022-01-19 VITALS — SYSTOLIC BLOOD PRESSURE: 136 MMHG | HEART RATE: 76 BPM | DIASTOLIC BLOOD PRESSURE: 83 MMHG

## 2022-01-19 DIAGNOSIS — D68.69 SECONDARY HYPERCOAGULABLE STATE (HCC): ICD-10-CM

## 2022-01-19 LAB
INR BLD: 2.6 (ref 0.9–1.2)
INR PPP: 2.6 (ref 2–3.5)

## 2022-01-19 PROCEDURE — 99211 OFF/OP EST MAY X REQ PHY/QHP: CPT

## 2022-01-19 PROCEDURE — 85610 PROTHROMBIN TIME: CPT

## 2022-01-19 NOTE — PROGRESS NOTES
Anticoagulation Summary  As of 2022    INR goal:  2.0-3.0   TTR:  35.1 % (8.6 mo)   INR used for dosin.60 (2022)   Warfarin maintenance plan:  6 mg (6 mg x 1) every day   Weekly warfarin total:  42 mg   Plan last modified:  Hilda WadsworthD (2021)   Next INR check:  2022   Target end date:  Indefinite    Indications    Atrial fibrillation (HCC) [I48.91]  Secondary hypercoagulable state (HCC) [D68.69]             Anticoagulation Episode Summary     INR check location:      Preferred lab:      Send INR reminders to:      Comments:             Refer to Patient Findings for HPI:  Patient Findings     Positives:  Change in health (Recent cold (states not COVID)), Change in medications (OTC dayquil for cold)    Negatives:  Signs/symptoms of thrombosis, Signs/symptoms of bleeding, Laboratory test error suspected, Change in alcohol use, Change in activity, Upcoming invasive procedure, Emergency department visit, Upcoming dental procedure, Missed doses, Extra doses, Change in diet/appetite, Hospital admission, Bruising, Other complaints          Vitals:    22 1311   BP: 136/83   BP Location: Right arm   Patient Position: Sitting   BP Cuff Size: Large adult   Pulse: 76       Verified current warfarin dosing schedule.    Medications reconciled   Pt is not on antiplatelet therapy      A/P   INR therapeutic.     Warfarin dosing recommendation: Instructed pt to continue on with current regimen.    Pt educated to contact our clinic with any changes in medications or s/s of bleeding or thrombosis. Pt is aware to seek immediate medical attention for falls, head injury or deep cuts.    Follow up appointment in 2 week(s).    Noy Cortes, Pharmacy Intern  Chris Kang, PharmD, BCACP

## 2022-01-28 ENCOUNTER — APPOINTMENT (OUTPATIENT)
Dept: RADIOLOGY | Facility: MEDICAL CENTER | Age: 47
End: 2022-01-28
Attending: EMERGENCY MEDICINE
Payer: MEDICAID

## 2022-01-28 ENCOUNTER — HOSPITAL ENCOUNTER (EMERGENCY)
Facility: MEDICAL CENTER | Age: 47
End: 2022-01-28
Attending: EMERGENCY MEDICINE
Payer: MEDICAID

## 2022-01-28 VITALS
DIASTOLIC BLOOD PRESSURE: 63 MMHG | BODY MASS INDEX: 52.87 KG/M2 | HEIGHT: 61 IN | RESPIRATION RATE: 20 BRPM | HEART RATE: 76 BPM | WEIGHT: 280 LBS | SYSTOLIC BLOOD PRESSURE: 124 MMHG | TEMPERATURE: 98.4 F | OXYGEN SATURATION: 96 %

## 2022-01-28 DIAGNOSIS — J44.1 ACUTE EXACERBATION OF CHRONIC OBSTRUCTIVE PULMONARY DISEASE (COPD) (HCC): ICD-10-CM

## 2022-01-28 DIAGNOSIS — R05.9 COUGH: ICD-10-CM

## 2022-01-28 DIAGNOSIS — R06.2 WHEEZE: ICD-10-CM

## 2022-01-28 LAB — EKG IMPRESSION: NORMAL

## 2022-01-28 PROCEDURE — U0005 INFEC AGEN DETEC AMPLI PROBE: HCPCS

## 2022-01-28 PROCEDURE — U0003 INFECTIOUS AGENT DETECTION BY NUCLEIC ACID (DNA OR RNA); SEVERE ACUTE RESPIRATORY SYNDROME CORONAVIRUS 2 (SARS-COV-2) (CORONAVIRUS DISEASE [COVID-19]), AMPLIFIED PROBE TECHNIQUE, MAKING USE OF HIGH THROUGHPUT TECHNOLOGIES AS DESCRIBED BY CMS-2020-01-R: HCPCS

## 2022-01-28 PROCEDURE — 96374 THER/PROPH/DIAG INJ IV PUSH: CPT

## 2022-01-28 PROCEDURE — 93005 ELECTROCARDIOGRAM TRACING: CPT

## 2022-01-28 PROCEDURE — 71045 X-RAY EXAM CHEST 1 VIEW: CPT

## 2022-01-28 PROCEDURE — 700111 HCHG RX REV CODE 636 W/ 250 OVERRIDE (IP): Performed by: EMERGENCY MEDICINE

## 2022-01-28 PROCEDURE — 99284 EMERGENCY DEPT VISIT MOD MDM: CPT

## 2022-01-28 PROCEDURE — 93005 ELECTROCARDIOGRAM TRACING: CPT | Performed by: EMERGENCY MEDICINE

## 2022-01-28 RX ORDER — PREDNISONE 20 MG/1
60 TABLET ORAL DAILY
Qty: 12 TABLET | Refills: 0 | Status: SHIPPED | OUTPATIENT
Start: 2022-01-28 | End: 2022-02-01

## 2022-01-28 RX ORDER — IPRATROPIUM BROMIDE AND ALBUTEROL SULFATE 2.5; .5 MG/3ML; MG/3ML
3 SOLUTION RESPIRATORY (INHALATION)
Status: DISCONTINUED | OUTPATIENT
Start: 2022-01-28 | End: 2022-01-28 | Stop reason: HOSPADM

## 2022-01-28 RX ORDER — METHYLPREDNISOLONE SODIUM SUCCINATE 125 MG/2ML
125 INJECTION, POWDER, LYOPHILIZED, FOR SOLUTION INTRAMUSCULAR; INTRAVENOUS ONCE
Status: COMPLETED | OUTPATIENT
Start: 2022-01-28 | End: 2022-01-28

## 2022-01-28 RX ADMIN — METHYLPREDNISOLONE SODIUM SUCCINATE 125 MG: 125 INJECTION, POWDER, FOR SOLUTION INTRAMUSCULAR; INTRAVENOUS at 19:22

## 2022-01-28 ASSESSMENT — FIBROSIS 4 INDEX: FIB4 SCORE: 0.62

## 2022-01-29 LAB
SARS-COV-2 RNA RESP QL NAA+PROBE: NOTDETECTED
SPECIMEN SOURCE: NORMAL

## 2022-01-29 NOTE — ED NOTES
Handoff report received from JOANNE RN.    Pt laying quietly in [facility]. No apparent distress noted at this time with non-labored breathing. Noted equal rise and fall of chest wall. Will continue to monitor pt.

## 2022-01-29 NOTE — ED NOTES
Pt assisted to the restroom. Pt tolerated activity with standby assist. No apparent distress noted at this time. VS stable and will continue to monitor pt.    Urine specimen collected and sent to lab.

## 2022-01-29 NOTE — ED PROVIDER NOTES
ED Provider Note    CHIEF COMPLAINT  Chief Complaint   Patient presents with   • Cough     started 1 week ago   • Shortness of Breath     started 1 week ago. worsening today. Pt has hx of Asthma and COPD. pt received 1 albuterol per EMS. Pt on 3L O2 baseline.       HPI  Lorene Haro is a 46 y.o. female who presents for evaluation of a cough for the past week associated with shortness of breath.  History of COPD on chronic 3 L of nasal cannula supplemental oxygen.  She reports progressive worsening of her symptoms, called EMS and received breathing treatments prior to arrival.  No chest pain no abdominal pain.  No fever.  No abdominal pain.  On Coumadin.  Past medical history is also significant for schizophrenia    REVIEW OF SYSTEMS  Negative for fever, rash, chest pain, abdominal pain, nausea, vomiting, diarrhea, headache. All other systems are negative.     PAST MEDICAL HISTORY  Past Medical History:   Diagnosis Date   • Asthma    • Bipolar 2 disorder (HCC)    • Chickenpox    • Chronic obstructive pulmonary disease (HCC)    • Hypertension    • On home oxygen therapy    • Other psychological stress    • Schizophrenic disorder (HCC)    • Yeast infection of the skin 4/1/2021       FAMILY HISTORY  Family History   Problem Relation Age of Onset   • No Known Problems Mother    • Cancer Sister        SOCIAL HISTORY  Social History     Tobacco Use   • Smoking status: Current Every Day Smoker     Years: 20.00     Types: Cigarettes   • Smokeless tobacco: Never Used   • Tobacco comment: 1 cigarette/day    Vaping Use   • Vaping Use: Never used   Substance Use Topics   • Alcohol use: No   • Drug use: No       SURGICAL HISTORY  Past Surgical History:   Procedure Laterality Date   • CHOLECYSTECTOMY     • COLECTOMY     • HYSTERECTOMY LAPAROSCOPY     • KNEE ARTHROSCOPY Left        CURRENT MEDICATIONS  I personally reviewed the medication list in the charting documentation.     ALLERGIES  Allergies   Allergen Reactions   •  "Amoxicillin Rash and Itching     Tolerates cephalosporins, pt reports that she gets a rash all over her body and gets itchy   • Penicillin G Rash and Itching     pt reports that she gets a rash all over her body and gets itchy       MEDICAL RECORD  I have reviewed patient's medical record and pertinent results are listed above.      PHYSICAL EXAM  VITAL SIGNS: /63   Pulse 82   Temp 36.7 °C (98 °F) (Temporal)   Resp 18   Ht 1.549 m (5' 1\")   Wt (!) 127 kg (280 lb)   LMP  (LMP Unknown)   SpO2 96%   BMI 52.91 kg/m²    Constitutional: Chronically ill-appearing, no acute distress, awake and alert  HENT: Normocephalic, no obvious evidence of acute trauma.  Eyes: No scleral icterus. Normal conjunctiva   Neck: Comfortable movement without any obvious restriction in the range of motion.  Cardiovascular: Upon ascultation I appreciate a regular heart rhythm and a normal rate.   Thorax & Lungs: Normal nonlabored respirations.  Upon auscultation she has expiratory wheezes throughout all lung fields with marginal air movement.  No focal rales.  Abdomen: The abdomen is not visibly distended. Upon palpation, I find it to be without tenderness.  No mass appreciated.  Skin: The exposed portions of skin reveal no obvious rash or other abnormalities.  Extremities/Musculoskeletal: Symmetric but mild lower extremity edema.  Neurologic: Alert & oriented. No focal deficits observed.   Psychiatric: Normal affect appropriate for the clinical situation.    DIAGNOSTIC STUDIES / PROCEDURES    LABS/EKGs  Results for orders placed or performed during the hospital encounter of 01/28/22   SARS-CoV-2 PCR (24 hour In-House): Collect NP swab in VTM    Specimen: Respirate   Result Value Ref Range    SARS-CoV-2 Source NP Swab    EKG   Result Value Ref Range    Report       [facility] Emergency Dept.    Test Date:  2022-01-28  Pt Name:    ADELINA MILLAN                Department: ER  MRN:        [id number]                    "   Room:       [facility]  Gender:     Female                       Technician: [id number]  :        1975                   Requested By:ER TRIAGE PROTOCOL  Order #:    [id number]                    Reading MD: BISI CLARK MD    Measurements  Intervals                                Axis  Rate:       84                           P:  KS:                                      QRS:        49  QRSD:       96                           T:          6  QT:         367  QTc:        434    Interpretive Statements  12 Lead EKG interpreted by me to show: -- Rate 84 -- Rhythm: Normal sinus  rhythm  -- Axis: Normal -- KS and QRS Intervals: Normal -- T waves: No acute changes  --  ST segments: No acute changes -- Ectopy: None. My impression of this EKG:  Does  not indicate acute ischemia at this time.  Electronically Signed  On 2022 20:19:02 PST by BISI CLARK MD          RADIOLOGY  DX-CHEST-LIMITED (1 VIEW)   Final Result      1.  Enlarged cardiac silhouette is   2.  Linear right mid lung opacity likely atelectasis.            COURSE & MEDICAL DECISION MAKING  I have reviewed any medical record information, laboratory studies and radiographic results as noted above.  Differential diagnoses includes: Pneumonia, COPD, COVID    Encounter Summary: This is a very pleasant 46 y.o. female who unfortunately required evaluation in the emergency department today with exacerbation of her COPD, not requiring supplemental oxygen above her normal baseline.  She has an x-ray that does not reveal obvious infiltrative disease, her EKG is unremarkable.  Her Covid test is pending and will result tomorrow.  In the meantime she is been treated with a nebulizer and is feeling better.  She is been treated with some Solu-Medrol be prescribed prednisone.  Return instructions have been provided, she is discharged home in stable condition      DISPOSITION: Discharged home in stable condition      FINAL IMPRESSION  1. Acute exacerbation  of chronic obstructive pulmonary disease (COPD) ([facility])    2. Cough           This dictation was created using voice recognition software. The accuracy of the dictation is limited to the abilities of the software. I expect there may be some errors of grammar and possibly content. The nursing notes were reviewed and certain aspects of this information were incorporated into this note.    Electronically signed by: Leobardo Parker M.D., 1/28/2022 6:35 PM

## 2022-01-29 NOTE — ED NOTES
Pt cleared for discharge by ERP.    Pt discharged in stable condition. Discharge instructions given and explained to pt; verbalized understanding. Pt ambulated out of the ER with a walker with a steady and stable gait.

## 2022-01-29 NOTE — ED TRIAGE NOTES
"Chief Complaint   Patient presents with   • Cough     started 1 week ago   • Shortness of Breath     started 1 week ago. worsening today. Pt has hx of Asthma and COPD. pt received 1 albuterol per EMS. Pt on 3L O2 baseline.     BIB EMS from home.    /63   Pulse 82   Temp 36.7 °C (98 °F) (Temporal)   Resp 18   Ht 1.549 m (5' 1\")   Wt (!) 127 kg (280 lb)   LMP  (LMP Unknown)   SpO2 96%   BMI 52.91 kg/m²     "

## 2022-01-31 NOTE — TELEPHONE ENCOUNTER
Have we ever prescribed this med? Yes.  If yes, what date? 12/16/21    Last OV: 12/16/21    Next OV: 03/16/22    DX: wheeze    Medications: montelucast

## 2022-02-01 ENCOUNTER — TELEPHONE (OUTPATIENT)
Dept: SLEEP MEDICINE | Facility: MEDICAL CENTER | Age: 47
End: 2022-02-01

## 2022-02-01 RX ORDER — MONTELUKAST SODIUM 10 MG/1
TABLET ORAL
Qty: 90 TABLET | Refills: 3 | Status: ON HOLD | OUTPATIENT
Start: 2022-02-01 | End: 2022-08-30 | Stop reason: SDUPTHER

## 2022-02-01 NOTE — TELEPHONE ENCOUNTER
Liz from [facility] is calling to see about getting the patient a CPAP machine.    Call back #[phone]

## 2022-02-02 ENCOUNTER — APPOINTMENT (OUTPATIENT)
Dept: VASCULAR LAB | Facility: MEDICAL CENTER | Age: 47
End: 2022-02-02
Attending: INTERNAL MEDICINE
Payer: MEDICAID

## 2022-02-03 ENCOUNTER — APPOINTMENT (OUTPATIENT)
Dept: VASCULAR LAB | Facility: MEDICAL CENTER | Age: 47
End: 2022-02-03
Attending: INTERNAL MEDICINE
Payer: MEDICAID

## 2022-02-04 ENCOUNTER — ANTICOAGULATION VISIT (OUTPATIENT)
Dept: VASCULAR LAB | Facility: MEDICAL CENTER | Age: 47
End: 2022-02-04
Attending: INTERNAL MEDICINE
Payer: MEDICAID

## 2022-02-04 VITALS — HEART RATE: 87 BPM | SYSTOLIC BLOOD PRESSURE: 121 MMHG | DIASTOLIC BLOOD PRESSURE: 79 MMHG

## 2022-02-04 DIAGNOSIS — D68.69 SECONDARY HYPERCOAGULABLE STATE (HCC): ICD-10-CM

## 2022-02-04 LAB
INR BLD: 3.1 (ref 0.9–1.2)
INR PPP: 3.1 (ref 2–3.5)

## 2022-02-04 PROCEDURE — 99212 OFFICE O/P EST SF 10 MIN: CPT

## 2022-02-04 PROCEDURE — 85610 PROTHROMBIN TIME: CPT

## 2022-02-07 NOTE — TELEPHONE ENCOUNTER
Called dallas and she stated pt was scheduled for March and I advised that the pt already has a machine.

## 2022-02-11 ENCOUNTER — OFFICE VISIT (OUTPATIENT)
Dept: URGENT CARE | Facility: CLINIC | Age: 47
End: 2022-02-11
Payer: MEDICAID

## 2022-02-11 ENCOUNTER — APPOINTMENT (OUTPATIENT)
Dept: RADIOLOGY | Facility: IMAGING CENTER | Age: 47
End: 2022-02-11
Attending: PHYSICIAN ASSISTANT
Payer: MEDICAID

## 2022-02-11 VITALS
WEIGHT: 290 LBS | TEMPERATURE: 98 F | HEIGHT: 62 IN | RESPIRATION RATE: 22 BRPM | HEART RATE: 68 BPM | SYSTOLIC BLOOD PRESSURE: 128 MMHG | OXYGEN SATURATION: 95 % | BODY MASS INDEX: 53.37 KG/M2 | DIASTOLIC BLOOD PRESSURE: 78 MMHG

## 2022-02-11 DIAGNOSIS — R06.02 SHORTNESS OF BREATH: ICD-10-CM

## 2022-02-11 DIAGNOSIS — J44.1 COPD WITH EXACERBATION (HCC): ICD-10-CM

## 2022-02-11 PROCEDURE — 71046 X-RAY EXAM CHEST 2 VIEWS: CPT | Mod: TC | Performed by: PHYSICIAN ASSISTANT

## 2022-02-11 PROCEDURE — 99214 OFFICE O/P EST MOD 30 MIN: CPT | Performed by: PHYSICIAN ASSISTANT

## 2022-02-11 RX ORDER — PREDNISONE 20 MG/1
TABLET ORAL
Qty: 10 TABLET | Refills: 0 | Status: SHIPPED | OUTPATIENT
Start: 2022-02-11 | End: 2022-02-23

## 2022-02-11 RX ORDER — AZITHROMYCIN 250 MG/1
TABLET, FILM COATED ORAL
Qty: 6 TABLET | Refills: 0 | Status: SHIPPED | OUTPATIENT
Start: 2022-02-11 | End: 2022-02-23

## 2022-02-11 RX ORDER — LEVOFLOXACIN 500 MG/1
500 TABLET, FILM COATED ORAL DAILY
Qty: 5 TABLET | Refills: 0 | Status: CANCELLED | OUTPATIENT
Start: 2022-02-11

## 2022-02-11 ASSESSMENT — FIBROSIS 4 INDEX: FIB4 SCORE: 0.62

## 2022-02-12 NOTE — PROGRESS NOTES
Subjective:   Lorene Haro is a 46 y.o. female who presents for Cough (chest congestion/shortness of breath/ fatigue/x1 month)        Patient presents for evaluation of ongoing severe cough associated with shortness of breath.  She has a history of COPD on chronic 3 L of nasal cannula supplemental oxygen.  She reports progressive symptoms.  She was recently seen in the [facility] ED on January 28, 2022.  She was diagnosed with acute exacerbation of COPD and cough.  She was treated with a nebulizer and Solu-Medrol.  She was discharged with prednisone, 5-day course.  Today she reports symptoms did improve briefly but have since regressed.  She denies fever or chills.  Her O2 sats at home are unchanged.  She has not been treated with antibiotic therapy.  She denies chest pain.  She denies any lower extremity swelling.        ROS    Medications:    • acetaminophen Tabs  • albuterol Aers  • aripiprazole  • atorvastatin Tabs  • azithromycin Tabs  • budesonide-formoterol Aero  • famotidine Tabs  • fluticasone  • Home Care Oxygen  • montelukast Tabs  • nicotine Pt24  • nitroglycerin Subl  • NON SPECIFIED  • ondansetron Tabs  • ondansetron Tbdp  • predniSONE Tabs  • Spiriva HandiHaler Caps  • traZODone Tabs  • warfarin Tabs    Allergies: Amoxicillin and Penicillin g    Problem List: Lorene Haro does not have any pertinent problems on file.    Surgical History:  Past Surgical History:   Procedure Laterality Date   • CHOLECYSTECTOMY     • COLECTOMY     • HYSTERECTOMY LAPAROSCOPY     • KNEE ARTHROSCOPY Left        Past Social Hx: Lorene Haro  reports that she has been smoking cigarettes. She has smoked for the past 20.00 years. She has never used smokeless tobacco. She reports that she does not drink alcohol and does not use drugs.     Past Family Hx:  Lorene Haro family history includes Cancer in her sister; No Known Problems in her mother.       Problem list, medications, and allergies reviewed by myself  "today in Epic.     Objective:     /78 (BP Location: Right arm, Patient Position: Sitting)   Pulse 68   Temp 36.7 °C (98 °F) (Temporal)   Resp (!) 22   Ht 1.575 m (5' 2\")   Wt (!) 132 kg (290 lb)   LMP  (LMP Unknown)   SpO2 95%   BMI 53.04 kg/m²     Physical Exam    Assessment/Plan:     Diagnosis and associated orders:     1. Shortness of breath  DX-CHEST-2 VIEWS    predniSONE (DELTASONE) 20 MG Tab    azithromycin (ZITHROMAX) 250 MG Tab   2. COPD with exacerbation (HCC)  azithromycin (ZITHROMAX) 250 MG Tab      Comments/MDM:     • Given complex clinical course and recent visit to ED 1/28 I am starting her on a course of antibiotics for exacerbation of COPD.    • She will be started on another course of prednisone  • She needs to continue her home inhalers  • Her oxygen levels are stable and her CXR does not show clinical signs of pneumonia.  • She should f/u with her PCP and pulmonologist  • Strict ED precautions discussed         I personally reviewed prior external notes and test results pertinent to today's visit.  Red flags discussed as well as indications to present to the Emergency Department.  Supportive care, natural history, differential diagnoses, and indications for immediate follow-up discussed.  Patient expresses understanding and agrees to plan.  Patient denies any other questions or concerns.    Follow-up with the primary care physician for recheck, reevaluation, and consideration of further management.      Please note that this dictation was created using voice recognition software. I have made a reasonable attempt to correct obvious errors, but I expect that there are errors of grammar and possibly content that I did not discover before finalizing the note.    This note was electronically signed by Katrin Spence PA-C        "

## 2022-02-14 ENCOUNTER — ANTICOAGULATION MONITORING (OUTPATIENT)
Dept: MEDICAL GROUP | Facility: MEDICAL CENTER | Age: 47
End: 2022-02-14

## 2022-02-14 DIAGNOSIS — D68.69 SECONDARY HYPERCOAGULABLE STATE (HCC): ICD-10-CM

## 2022-02-14 NOTE — PROGRESS NOTES
Rc'd message patient is starting on prednisone 40 mg x 5 days 2/11/22. Left VM advising to decrease warfarin dose to 3 mg x 2 days. Next INR 2/16/22    Hilda ButtD

## 2022-02-16 ENCOUNTER — ANTICOAGULATION VISIT (OUTPATIENT)
Dept: VASCULAR LAB | Facility: MEDICAL CENTER | Age: 47
End: 2022-02-16
Attending: INTERNAL MEDICINE
Payer: MEDICAID

## 2022-02-16 DIAGNOSIS — I48.91 ATRIAL FIBRILLATION, UNSPECIFIED TYPE (HCC): ICD-10-CM

## 2022-02-16 DIAGNOSIS — D68.69 SECONDARY HYPERCOAGULABLE STATE (HCC): ICD-10-CM

## 2022-02-16 LAB — INR PPP: 1.6 (ref 2–3.5)

## 2022-02-16 PROCEDURE — 99212 OFFICE O/P EST SF 10 MIN: CPT | Performed by: PHARMACIST

## 2022-02-16 PROCEDURE — 85610 PROTHROMBIN TIME: CPT

## 2022-02-16 NOTE — PROGRESS NOTES
Anticoagulation Summary  As of 2022    INR goal:  2.0-3.0   TTR:  38.9 % (9.6 mo)   INR used for dosin.60 (2022)   Warfarin maintenance plan:  6 mg (6 mg x 1) every day   Weekly warfarin total:  42 mg   Plan last modified:  Hilda WadsworthD (2021)   Next INR check:  2022   Target end date:  Indefinite    Indications    Atrial fibrillation (HCC) [I48.91]  Secondary hypercoagulable state (HCC) [D68.69]             Anticoagulation Episode Summary     INR check location:      Preferred lab:      Send INR reminders to:      Comments:                  Refer to Patient Findings for HPI:  Patient Findings     Positives:  Change in medications (two days left on course of prednisone)    Negatives:  Signs/symptoms of thrombosis, Signs/symptoms of bleeding, Laboratory test error suspected, Change in health, Change in alcohol use, Change in activity, Upcoming invasive procedure, Emergency department visit, Upcoming dental procedure, Missed doses, Extra doses, Change in diet/appetite, Hospital admission, Bruising, Other complaints          There were no vitals filed for this visit.  pt declined vitals    Verified current warfarin dosing schedule.    Medications reconciled  Pt is not on antiplatelet therapy      A/P   INR  sub-therapeutic at 1.6.     Warfarin dosing recommendation:   Patient did not make dosing adjustments that were advised per last note.  Instructed her to bolus with 9mg X 1, then resume current warfarin regimen.    Pt educated to contact our clinic with any changes in medications or s/s of bleeding or thrombosis. Pt is aware to seek immediate medical attention for falls, head injury or deep cuts.    Follow up appointment in 1 week(s).    José Miguel Gallegos, HildaD, BCACP

## 2022-02-23 ENCOUNTER — ANTICOAGULATION VISIT (OUTPATIENT)
Dept: VASCULAR LAB | Facility: MEDICAL CENTER | Age: 47
End: 2022-02-23
Attending: INTERNAL MEDICINE
Payer: MEDICAID

## 2022-02-23 DIAGNOSIS — I48.11 LONGSTANDING PERSISTENT ATRIAL FIBRILLATION (HCC): ICD-10-CM

## 2022-02-23 DIAGNOSIS — D68.69 SECONDARY HYPERCOAGULABLE STATE (HCC): ICD-10-CM

## 2022-02-23 LAB — INR PPP: 2.8 (ref 2–3.5)

## 2022-02-23 PROCEDURE — 85610 PROTHROMBIN TIME: CPT

## 2022-02-23 PROCEDURE — 99211 OFF/OP EST MAY X REQ PHY/QHP: CPT | Performed by: NURSE PRACTITIONER

## 2022-02-23 NOTE — PROGRESS NOTES
Anticoagulation Summary  As of 2022    INR goal:  2.0-3.0   TTR:  39.6 % (9.8 mo)   INR used for dosin.80 (2022)   Warfarin maintenance plan:  6 mg (6 mg x 1) every day   Weekly warfarin total:  42 mg   Plan last modified:  Kurt Cai, PharmD (2021)   Next INR check:  3/2/2022   Target end date:  Indefinite    Indications    Atrial fibrillation (HCC) [I48.91]  Secondary hypercoagulable state (HCC) [D68.69]             Anticoagulation Episode Summary     INR check location:      Preferred lab:      Send INR reminders to:      Comments:                  Refer to Patient Findings for HPI:  Patient Findings     Negatives:  Signs/symptoms of thrombosis, Signs/symptoms of bleeding, Laboratory test error suspected, Change in health, Change in alcohol use, Change in activity, Upcoming invasive procedure, Emergency department visit, Upcoming dental procedure, Missed doses, Extra doses, Change in medications, Change in diet/appetite, Hospital admission, Bruising, Other complaints    Comments:  No longer taking prednisone or z-mita. Finished 2/15/22.          There were no vitals filed for this visit.   pt declined vitals    Verified current warfarin dosing schedule.    Medications reconciled  Pt is not on antiplatelet therapy      A/P   INR  -therapeutic.     Warfarin dosing recommendation: Resume taking 6 mg daily.    Pt educated to contact our clinic with any changes in medications or s/s of bleeding or thrombosis. Pt is aware to seek immediate medical attention for falls, head injury or deep cuts.    Follow up appointment in 1 week(s).    BELÉN Zhou.

## 2022-02-25 LAB — INR BLD: 2.8 (ref 0.9–1.2)

## 2022-02-26 ENCOUNTER — APPOINTMENT (OUTPATIENT)
Dept: RADIOLOGY | Facility: MEDICAL CENTER | Age: 47
End: 2022-02-26
Attending: EMERGENCY MEDICINE
Payer: MEDICAID

## 2022-02-26 ENCOUNTER — APPOINTMENT (OUTPATIENT)
Dept: RADIOLOGY | Facility: MEDICAL CENTER | Age: 47
End: 2022-02-26
Payer: MEDICAID

## 2022-02-26 ENCOUNTER — HOSPITAL ENCOUNTER (OUTPATIENT)
Facility: MEDICAL CENTER | Age: 47
End: 2022-02-28
Attending: EMERGENCY MEDICINE | Admitting: STUDENT IN AN ORGANIZED HEALTH CARE EDUCATION/TRAINING PROGRAM
Payer: MEDICAID

## 2022-02-26 DIAGNOSIS — J96.11 CHRONIC RESPIRATORY FAILURE WITH HYPOXIA AND HYPERCAPNIA (HCC): ICD-10-CM

## 2022-02-26 DIAGNOSIS — R11.0 NAUSEA: ICD-10-CM

## 2022-02-26 DIAGNOSIS — R79.89 ELEVATED TROPONIN: ICD-10-CM

## 2022-02-26 DIAGNOSIS — E66.2 OBESITY HYPOVENTILATION SYNDROME (HCC): ICD-10-CM

## 2022-02-26 DIAGNOSIS — E66.01 CLASS 3 SEVERE OBESITY DUE TO EXCESS CALORIES WITHOUT SERIOUS COMORBIDITY WITH BODY MASS INDEX (BMI) OF 45.0 TO 49.9 IN ADULT (HCC): ICD-10-CM

## 2022-02-26 DIAGNOSIS — G47.33 OSA (OBSTRUCTIVE SLEEP APNEA): ICD-10-CM

## 2022-02-26 DIAGNOSIS — J45.50 SEVERE PERSISTENT ASTHMA WITHOUT COMPLICATION: ICD-10-CM

## 2022-02-26 DIAGNOSIS — J96.12 CHRONIC RESPIRATORY FAILURE WITH HYPOXIA AND HYPERCAPNIA (HCC): ICD-10-CM

## 2022-02-26 DIAGNOSIS — R53.1 WEAKNESS: ICD-10-CM

## 2022-02-26 DIAGNOSIS — M79.602 PAIN IN BOTH UPPER EXTREMITIES: ICD-10-CM

## 2022-02-26 DIAGNOSIS — J45.51 SEVERE PERSISTENT ASTHMA WITH ACUTE EXACERBATION: ICD-10-CM

## 2022-02-26 DIAGNOSIS — R07.9 ACUTE CHEST PAIN: ICD-10-CM

## 2022-02-26 DIAGNOSIS — R07.9 CHEST PAIN, RULE OUT ACUTE MYOCARDIAL INFARCTION: ICD-10-CM

## 2022-02-26 DIAGNOSIS — M79.601 PAIN IN BOTH UPPER EXTREMITIES: ICD-10-CM

## 2022-02-26 LAB
ALBUMIN SERPL BCP-MCNC: 4 G/DL (ref 3.2–4.9)
ALBUMIN/GLOB SERPL: 1.3 G/DL
ALP SERPL-CCNC: 142 U/L (ref 30–99)
ALT SERPL-CCNC: 11 U/L (ref 2–50)
ANION GAP SERPL CALC-SCNC: 10 MMOL/L (ref 7–16)
APPEARANCE UR: CLEAR
AST SERPL-CCNC: 6 U/L (ref 12–45)
BASOPHILS # BLD AUTO: 0.1 % (ref 0–1.8)
BASOPHILS # BLD: 0.01 K/UL (ref 0–0.12)
BILIRUB SERPL-MCNC: 0.5 MG/DL (ref 0.1–1.5)
BILIRUB UR QL STRIP.AUTO: NEGATIVE
BUN SERPL-MCNC: 10 MG/DL (ref 8–22)
CALCIUM SERPL-MCNC: 9.6 MG/DL (ref 8.5–10.5)
CHLORIDE SERPL-SCNC: 102 MMOL/L (ref 96–112)
CO2 SERPL-SCNC: 30 MMOL/L (ref 20–33)
COLOR UR: YELLOW
CREAT SERPL-MCNC: 0.82 MG/DL (ref 0.5–1.4)
EKG IMPRESSION: NORMAL
EOSINOPHIL # BLD AUTO: 0.11 K/UL (ref 0–0.51)
EOSINOPHIL NFR BLD: 1 % (ref 0–6.9)
ERYTHROCYTE [DISTWIDTH] IN BLOOD BY AUTOMATED COUNT: 58 FL (ref 35.9–50)
GLOBULIN SER CALC-MCNC: 3.2 G/DL (ref 1.9–3.5)
GLUCOSE SERPL-MCNC: 155 MG/DL (ref 65–99)
GLUCOSE UR STRIP.AUTO-MCNC: NEGATIVE MG/DL
HCT VFR BLD AUTO: 43 % (ref 37–47)
HGB BLD-MCNC: 12.9 G/DL (ref 12–16)
IMM GRANULOCYTES # BLD AUTO: 0.08 K/UL (ref 0–0.11)
IMM GRANULOCYTES NFR BLD AUTO: 0.7 % (ref 0–0.9)
KETONES UR STRIP.AUTO-MCNC: NEGATIVE MG/DL
LEUKOCYTE ESTERASE UR QL STRIP.AUTO: NEGATIVE
LIPASE SERPL-CCNC: 27 U/L (ref 11–82)
LYMPHOCYTES # BLD AUTO: 1.76 K/UL (ref 1–4.8)
LYMPHOCYTES NFR BLD: 15.5 % (ref 22–41)
MCH RBC QN AUTO: 25.3 PG (ref 27–33)
MCHC RBC AUTO-ENTMCNC: 30 G/DL (ref 33.6–35)
MCV RBC AUTO: 84.5 FL (ref 81.4–97.8)
MICRO URNS: NORMAL
MONOCYTES # BLD AUTO: 0.71 K/UL (ref 0–0.85)
MONOCYTES NFR BLD AUTO: 6.3 % (ref 0–13.4)
NEUTROPHILS # BLD AUTO: 8.69 K/UL (ref 2–7.15)
NEUTROPHILS NFR BLD: 76.4 % (ref 44–72)
NITRITE UR QL STRIP.AUTO: NEGATIVE
NRBC # BLD AUTO: 0 K/UL
NRBC BLD-RTO: 0 /100 WBC
NT-PROBNP SERPL IA-MCNC: 195 PG/ML (ref 0–125)
PH UR STRIP.AUTO: 6 [PH] (ref 5–8)
PLATELET # BLD AUTO: 257 K/UL (ref 164–446)
PMV BLD AUTO: 10.9 FL (ref 9–12.9)
POTASSIUM SERPL-SCNC: 4.3 MMOL/L (ref 3.6–5.5)
PROT SERPL-MCNC: 7.2 G/DL (ref 6–8.2)
PROT UR QL STRIP: NEGATIVE MG/DL
RBC # BLD AUTO: 5.09 M/UL (ref 4.2–5.4)
RBC UR QL AUTO: NEGATIVE
SODIUM SERPL-SCNC: 142 MMOL/L (ref 135–145)
SP GR UR STRIP.AUTO: 1.01
TROPONIN T SERPL-MCNC: 25 NG/L (ref 6–19)
TROPONIN T SERPL-MCNC: 25 NG/L (ref 6–19)
UROBILINOGEN UR STRIP.AUTO-MCNC: 0.2 MG/DL
WBC # BLD AUTO: 11.4 K/UL (ref 4.8–10.8)

## 2022-02-26 PROCEDURE — 93005 ELECTROCARDIOGRAM TRACING: CPT

## 2022-02-26 PROCEDURE — 85025 COMPLETE CBC W/AUTO DIFF WBC: CPT

## 2022-02-26 PROCEDURE — 700111 HCHG RX REV CODE 636 W/ 250 OVERRIDE (IP): Performed by: EMERGENCY MEDICINE

## 2022-02-26 PROCEDURE — 99220 PR INITIAL OBSERVATION CARE,LEVL III: CPT | Performed by: STUDENT IN AN ORGANIZED HEALTH CARE EDUCATION/TRAINING PROGRAM

## 2022-02-26 PROCEDURE — 93005 ELECTROCARDIOGRAM TRACING: CPT | Performed by: EMERGENCY MEDICINE

## 2022-02-26 PROCEDURE — 99285 EMERGENCY DEPT VISIT HI MDM: CPT

## 2022-02-26 PROCEDURE — 700101 HCHG RX REV CODE 250: Performed by: EMERGENCY MEDICINE

## 2022-02-26 PROCEDURE — 80053 COMPREHEN METABOLIC PANEL: CPT

## 2022-02-26 PROCEDURE — 94640 AIRWAY INHALATION TREATMENT: CPT

## 2022-02-26 PROCEDURE — 81003 URINALYSIS AUTO W/O SCOPE: CPT

## 2022-02-26 PROCEDURE — 84484 ASSAY OF TROPONIN QUANT: CPT

## 2022-02-26 PROCEDURE — 83690 ASSAY OF LIPASE: CPT

## 2022-02-26 PROCEDURE — 83880 ASSAY OF NATRIURETIC PEPTIDE: CPT

## 2022-02-26 PROCEDURE — G0378 HOSPITAL OBSERVATION PER HR: HCPCS

## 2022-02-26 PROCEDURE — 71045 X-RAY EXAM CHEST 1 VIEW: CPT

## 2022-02-26 RX ORDER — PREDNISONE 20 MG/1
40 TABLET ORAL ONCE
Status: COMPLETED | OUTPATIENT
Start: 2022-02-26 | End: 2022-02-26

## 2022-02-26 RX ADMIN — PREDNISONE 40 MG: 20 TABLET ORAL at 21:47

## 2022-02-26 RX ADMIN — ALBUTEROL SULFATE 2.5 MG: 2.5 SOLUTION RESPIRATORY (INHALATION) at 23:46

## 2022-02-26 ASSESSMENT — FIBROSIS 4 INDEX: FIB4 SCORE: 0.62

## 2022-02-27 ENCOUNTER — PATIENT OUTREACH (OUTPATIENT)
Dept: HEALTH INFORMATION MANAGEMENT | Facility: OTHER | Age: 47
End: 2022-02-27

## 2022-02-27 ENCOUNTER — APPOINTMENT (OUTPATIENT)
Dept: RADIOLOGY | Facility: MEDICAL CENTER | Age: 47
End: 2022-02-27
Attending: STUDENT IN AN ORGANIZED HEALTH CARE EDUCATION/TRAINING PROGRAM
Payer: MEDICAID

## 2022-02-27 ENCOUNTER — APPOINTMENT (OUTPATIENT)
Dept: CARDIOLOGY | Facility: MEDICAL CENTER | Age: 47
End: 2022-02-27
Attending: STUDENT IN AN ORGANIZED HEALTH CARE EDUCATION/TRAINING PROGRAM
Payer: MEDICAID

## 2022-02-27 LAB
AMPHET UR QL SCN: NEGATIVE
BARBITURATES UR QL SCN: NEGATIVE
BASE EXCESS BLDA CALC-SCNC: 1 MMOL/L (ref -4–3)
BENZODIAZ UR QL SCN: NEGATIVE
BODY TEMPERATURE: ABNORMAL CENTIGRADE
BZE UR QL SCN: NEGATIVE
CANNABINOIDS UR QL SCN: NEGATIVE
GLUCOSE BLD STRIP.AUTO-MCNC: 107 MG/DL (ref 65–99)
GLUCOSE BLD STRIP.AUTO-MCNC: 146 MG/DL (ref 65–99)
GLUCOSE BLD STRIP.AUTO-MCNC: 161 MG/DL (ref 65–99)
GLUCOSE BLD STRIP.AUTO-MCNC: 199 MG/DL (ref 65–99)
HCO3 BLDA-SCNC: 28 MMOL/L (ref 17–25)
INR PPP: 2.57 (ref 0.87–1.13)
LV EJECT FRACT  99904: 70
LV EJECT FRACT MOD 2C 99903: 61.58
LV EJECT FRACT MOD 4C 99902: 69.05
LV EJECT FRACT MOD BP 99901: 65.29
MAGNESIUM SERPL-MCNC: 2.3 MG/DL (ref 1.5–2.5)
METHADONE UR QL SCN: NEGATIVE
OPIATES UR QL SCN: NEGATIVE
OXYCODONE UR QL SCN: NEGATIVE
PCO2 BLDA: 57.1 MMHG (ref 26–37)
PCP UR QL SCN: NEGATIVE
PH BLDA: 7.31 [PH] (ref 7.4–7.5)
PHOSPHATE SERPL-MCNC: 4.3 MG/DL (ref 2.5–4.5)
PO2 BLDA: 77.4 MMHG (ref 64–87)
PROCALCITONIN SERPL-MCNC: 0.07 NG/ML
PROPOXYPH UR QL SCN: NEGATIVE
PROTHROMBIN TIME: 26.8 SEC (ref 12–14.6)
SAO2 % BLDA: 94.6 % (ref 93–99)
TROPONIN T SERPL-MCNC: 24 NG/L (ref 6–19)

## 2022-02-27 PROCEDURE — G0378 HOSPITAL OBSERVATION PER HR: HCPCS

## 2022-02-27 PROCEDURE — 700111 HCHG RX REV CODE 636 W/ 250 OVERRIDE (IP): Performed by: STUDENT IN AN ORGANIZED HEALTH CARE EDUCATION/TRAINING PROGRAM

## 2022-02-27 PROCEDURE — 82962 GLUCOSE BLOOD TEST: CPT

## 2022-02-27 PROCEDURE — 700101 HCHG RX REV CODE 250: Performed by: STUDENT IN AN ORGANIZED HEALTH CARE EDUCATION/TRAINING PROGRAM

## 2022-02-27 PROCEDURE — 84484 ASSAY OF TROPONIN QUANT: CPT

## 2022-02-27 PROCEDURE — 700111 HCHG RX REV CODE 636 W/ 250 OVERRIDE (IP)

## 2022-02-27 PROCEDURE — 700102 HCHG RX REV CODE 250 W/ 637 OVERRIDE(OP): Performed by: STUDENT IN AN ORGANIZED HEALTH CARE EDUCATION/TRAINING PROGRAM

## 2022-02-27 PROCEDURE — 94660 CPAP INITIATION&MGMT: CPT

## 2022-02-27 PROCEDURE — 80307 DRUG TEST PRSMV CHEM ANLYZR: CPT

## 2022-02-27 PROCEDURE — 82803 BLOOD GASES ANY COMBINATION: CPT

## 2022-02-27 PROCEDURE — 99226 PR SUBSEQUENT OBSERVATION CARE,LEVEL III: CPT | Performed by: STUDENT IN AN ORGANIZED HEALTH CARE EDUCATION/TRAINING PROGRAM

## 2022-02-27 PROCEDURE — 85610 PROTHROMBIN TIME: CPT

## 2022-02-27 PROCEDURE — 96372 THER/PROPH/DIAG INJ SC/IM: CPT

## 2022-02-27 PROCEDURE — 93306 TTE W/DOPPLER COMPLETE: CPT

## 2022-02-27 PROCEDURE — A9270 NON-COVERED ITEM OR SERVICE: HCPCS | Performed by: STUDENT IN AN ORGANIZED HEALTH CARE EDUCATION/TRAINING PROGRAM

## 2022-02-27 PROCEDURE — 700117 HCHG RX CONTRAST REV CODE 255: Performed by: STUDENT IN AN ORGANIZED HEALTH CARE EDUCATION/TRAINING PROGRAM

## 2022-02-27 PROCEDURE — 94669 MECHANICAL CHEST WALL OSCILL: CPT

## 2022-02-27 PROCEDURE — 84100 ASSAY OF PHOSPHORUS: CPT

## 2022-02-27 PROCEDURE — A9502 TC99M TETROFOSMIN: HCPCS

## 2022-02-27 PROCEDURE — 94640 AIRWAY INHALATION TREATMENT: CPT

## 2022-02-27 PROCEDURE — 94760 N-INVAS EAR/PLS OXIMETRY 1: CPT

## 2022-02-27 PROCEDURE — 83735 ASSAY OF MAGNESIUM: CPT

## 2022-02-27 PROCEDURE — 93306 TTE W/DOPPLER COMPLETE: CPT | Mod: 26 | Performed by: INTERNAL MEDICINE

## 2022-02-27 PROCEDURE — 99243 OFF/OP CNSLTJ NEW/EST LOW 30: CPT | Mod: GC | Performed by: INTERNAL MEDICINE

## 2022-02-27 PROCEDURE — 84145 PROCALCITONIN (PCT): CPT

## 2022-02-27 RX ORDER — FAMOTIDINE 20 MG/1
40 TABLET, FILM COATED ORAL DAILY
Status: DISCONTINUED | OUTPATIENT
Start: 2022-02-27 | End: 2022-02-28 | Stop reason: HOSPADM

## 2022-02-27 RX ORDER — REGADENOSON 0.08 MG/ML
0.4 INJECTION, SOLUTION INTRAVENOUS ONCE
Status: COMPLETED | OUTPATIENT
Start: 2022-02-27 | End: 2022-02-27

## 2022-02-27 RX ORDER — PROMETHAZINE HYDROCHLORIDE 25 MG/1
12.5-25 TABLET ORAL EVERY 4 HOURS PRN
Status: DISCONTINUED | OUTPATIENT
Start: 2022-02-27 | End: 2022-02-28 | Stop reason: HOSPADM

## 2022-02-27 RX ORDER — PROCHLORPERAZINE EDISYLATE 5 MG/ML
5-10 INJECTION INTRAMUSCULAR; INTRAVENOUS EVERY 4 HOURS PRN
Status: DISCONTINUED | OUTPATIENT
Start: 2022-02-27 | End: 2022-02-28 | Stop reason: HOSPADM

## 2022-02-27 RX ORDER — AZITHROMYCIN 250 MG/1
250 TABLET, FILM COATED ORAL DAILY
Qty: 4 TABLET | Refills: 0 | Status: SHIPPED | OUTPATIENT
Start: 2022-02-28 | End: 2022-03-04

## 2022-02-27 RX ORDER — WARFARIN SODIUM 6 MG/1
6 TABLET ORAL DAILY
Status: DISCONTINUED | OUTPATIENT
Start: 2022-02-27 | End: 2022-02-28 | Stop reason: HOSPADM

## 2022-02-27 RX ORDER — ATORVASTATIN CALCIUM 20 MG/1
20 TABLET, FILM COATED ORAL
Status: DISCONTINUED | OUTPATIENT
Start: 2022-02-27 | End: 2022-02-28 | Stop reason: HOSPADM

## 2022-02-27 RX ORDER — REGADENOSON 0.08 MG/ML
INJECTION, SOLUTION INTRAVENOUS
Status: COMPLETED
Start: 2022-02-27 | End: 2022-02-27

## 2022-02-27 RX ORDER — ASPIRIN 300 MG/1
300 SUPPOSITORY RECTAL DAILY
Status: DISCONTINUED | OUTPATIENT
Start: 2022-02-27 | End: 2022-02-28 | Stop reason: HOSPADM

## 2022-02-27 RX ORDER — SIMETHICONE 125 MG
125 TABLET,CHEWABLE ORAL 3 TIMES DAILY PRN
Status: DISCONTINUED | OUTPATIENT
Start: 2022-02-27 | End: 2022-02-28 | Stop reason: HOSPADM

## 2022-02-27 RX ORDER — MONTELUKAST SODIUM 10 MG/1
10 TABLET ORAL
Status: DISCONTINUED | OUTPATIENT
Start: 2022-02-27 | End: 2022-02-28 | Stop reason: HOSPADM

## 2022-02-27 RX ORDER — ARIPIPRAZOLE 2 MG/1
30 TABLET ORAL DAILY
Status: DISCONTINUED | OUTPATIENT
Start: 2022-02-27 | End: 2022-02-27

## 2022-02-27 RX ORDER — FLUTICASONE PROPIONATE 50 MCG
1-2 SPRAY, SUSPENSION (ML) NASAL 2 TIMES DAILY
Status: DISCONTINUED | OUTPATIENT
Start: 2022-02-27 | End: 2022-02-28 | Stop reason: HOSPADM

## 2022-02-27 RX ORDER — PREDNISONE 20 MG/1
40 TABLET ORAL DAILY
Status: DISCONTINUED | OUTPATIENT
Start: 2022-02-27 | End: 2022-02-28 | Stop reason: HOSPADM

## 2022-02-27 RX ORDER — DEXTROSE MONOHYDRATE 25 G/50ML
50 INJECTION, SOLUTION INTRAVENOUS
Status: DISCONTINUED | OUTPATIENT
Start: 2022-02-27 | End: 2022-02-28 | Stop reason: HOSPADM

## 2022-02-27 RX ORDER — PREDNISONE 20 MG/1
40 TABLET ORAL DAILY
Qty: 6 TABLET | Refills: 0 | Status: SHIPPED | OUTPATIENT
Start: 2022-02-28 | End: 2022-03-03

## 2022-02-27 RX ORDER — BISACODYL 10 MG
10 SUPPOSITORY, RECTAL RECTAL
Status: DISCONTINUED | OUTPATIENT
Start: 2022-02-27 | End: 2022-02-28 | Stop reason: HOSPADM

## 2022-02-27 RX ORDER — ASPIRIN 325 MG
325 TABLET ORAL DAILY
Status: DISCONTINUED | OUTPATIENT
Start: 2022-02-27 | End: 2022-02-28 | Stop reason: HOSPADM

## 2022-02-27 RX ORDER — ASPIRIN 81 MG/1
324 TABLET, CHEWABLE ORAL DAILY
Status: DISCONTINUED | OUTPATIENT
Start: 2022-02-27 | End: 2022-02-28 | Stop reason: HOSPADM

## 2022-02-27 RX ORDER — IPRATROPIUM BROMIDE AND ALBUTEROL SULFATE 2.5; .5 MG/3ML; MG/3ML
3 SOLUTION RESPIRATORY (INHALATION)
Status: DISCONTINUED | OUTPATIENT
Start: 2022-02-27 | End: 2022-02-28 | Stop reason: HOSPADM

## 2022-02-27 RX ORDER — AMOXICILLIN 250 MG
2 CAPSULE ORAL 2 TIMES DAILY
Status: DISCONTINUED | OUTPATIENT
Start: 2022-02-27 | End: 2022-02-28 | Stop reason: HOSPADM

## 2022-02-27 RX ORDER — IPRATROPIUM BROMIDE AND ALBUTEROL SULFATE 2.5; .5 MG/3ML; MG/3ML
3 SOLUTION RESPIRATORY (INHALATION)
Status: DISCONTINUED | OUTPATIENT
Start: 2022-02-27 | End: 2022-02-27

## 2022-02-27 RX ORDER — ACETAMINOPHEN 325 MG/1
650 TABLET ORAL EVERY 6 HOURS PRN
Status: DISCONTINUED | OUTPATIENT
Start: 2022-02-27 | End: 2022-02-28 | Stop reason: HOSPADM

## 2022-02-27 RX ORDER — ONDANSETRON 4 MG/1
4 TABLET, ORALLY DISINTEGRATING ORAL EVERY 4 HOURS PRN
Status: DISCONTINUED | OUTPATIENT
Start: 2022-02-27 | End: 2022-02-28 | Stop reason: HOSPADM

## 2022-02-27 RX ORDER — AZITHROMYCIN 250 MG/1
500 TABLET, FILM COATED ORAL DAILY
Status: DISCONTINUED | OUTPATIENT
Start: 2022-02-27 | End: 2022-02-28 | Stop reason: HOSPADM

## 2022-02-27 RX ORDER — POLYETHYLENE GLYCOL 3350 17 G/17G
1 POWDER, FOR SOLUTION ORAL
Status: DISCONTINUED | OUTPATIENT
Start: 2022-02-27 | End: 2022-02-28 | Stop reason: HOSPADM

## 2022-02-27 RX ORDER — PROMETHAZINE HYDROCHLORIDE 25 MG/1
12.5-25 SUPPOSITORY RECTAL EVERY 4 HOURS PRN
Status: DISCONTINUED | OUTPATIENT
Start: 2022-02-27 | End: 2022-02-28 | Stop reason: HOSPADM

## 2022-02-27 RX ORDER — LABETALOL HYDROCHLORIDE 5 MG/ML
10 INJECTION, SOLUTION INTRAVENOUS EVERY 4 HOURS PRN
Status: DISCONTINUED | OUTPATIENT
Start: 2022-02-27 | End: 2022-02-28 | Stop reason: HOSPADM

## 2022-02-27 RX ORDER — ARIPIPRAZOLE 15 MG/1
30 TABLET ORAL DAILY
Status: DISCONTINUED | OUTPATIENT
Start: 2022-02-27 | End: 2022-02-28 | Stop reason: HOSPADM

## 2022-02-27 RX ORDER — ONDANSETRON 2 MG/ML
4 INJECTION INTRAMUSCULAR; INTRAVENOUS EVERY 4 HOURS PRN
Status: DISCONTINUED | OUTPATIENT
Start: 2022-02-27 | End: 2022-02-28 | Stop reason: HOSPADM

## 2022-02-27 RX ORDER — BUDESONIDE AND FORMOTEROL FUMARATE DIHYDRATE 160; 4.5 UG/1; UG/1
2 AEROSOL RESPIRATORY (INHALATION) 2 TIMES DAILY
Status: DISCONTINUED | OUTPATIENT
Start: 2022-02-27 | End: 2022-02-28 | Stop reason: HOSPADM

## 2022-02-27 RX ADMIN — BUDESONIDE AND FORMOTEROL FUMARATE DIHYDRATE 2 PUFF: 160; 4.5 AEROSOL RESPIRATORY (INHALATION) at 05:47

## 2022-02-27 RX ADMIN — AZITHROMYCIN MONOHYDRATE 500 MG: 250 TABLET ORAL at 02:14

## 2022-02-27 RX ADMIN — MONTELUKAST 10 MG: 10 TABLET, FILM COATED ORAL at 02:14

## 2022-02-27 RX ADMIN — ATORVASTATIN CALCIUM 20 MG: 20 TABLET, FILM COATED ORAL at 21:13

## 2022-02-27 RX ADMIN — HUMAN ALBUMIN MICROSPHERES AND PERFLUTREN 3 ML: 10; .22 INJECTION, SOLUTION INTRAVENOUS at 11:24

## 2022-02-27 RX ADMIN — ASPIRIN 325 MG: 325 TABLET ORAL at 05:48

## 2022-02-27 RX ADMIN — FLUTICASONE PROPIONATE 50 MCG: 50 SPRAY, METERED NASAL at 17:35

## 2022-02-27 RX ADMIN — INSULIN HUMAN 2 UNITS: 100 INJECTION, SOLUTION PARENTERAL at 13:09

## 2022-02-27 RX ADMIN — MONTELUKAST 10 MG: 10 TABLET, FILM COATED ORAL at 21:13

## 2022-02-27 RX ADMIN — TIOTROPIUM BROMIDE INHALATION SPRAY 5 MCG: 3.12 SPRAY, METERED RESPIRATORY (INHALATION) at 07:53

## 2022-02-27 RX ADMIN — ARIPIPRAZOLE 30 MG: 15 TABLET ORAL at 05:48

## 2022-02-27 RX ADMIN — FLUTICASONE PROPIONATE 2 SPRAY: 50 SPRAY, METERED NASAL at 05:47

## 2022-02-27 RX ADMIN — REGADENOSON 0.4 MG: 0.08 INJECTION, SOLUTION INTRAVENOUS at 09:23

## 2022-02-27 RX ADMIN — IPRATROPIUM BROMIDE AND ALBUTEROL SULFATE 3 ML: .5; 2.5 SOLUTION RESPIRATORY (INHALATION) at 21:34

## 2022-02-27 RX ADMIN — ATORVASTATIN CALCIUM 20 MG: 20 TABLET, FILM COATED ORAL at 02:14

## 2022-02-27 RX ADMIN — INSULIN HUMAN 2 UNITS: 100 INJECTION, SOLUTION PARENTERAL at 21:16

## 2022-02-27 RX ADMIN — FAMOTIDINE 40 MG: 20 TABLET ORAL at 05:48

## 2022-02-27 RX ADMIN — WARFARIN SODIUM 6 MG: 6 TABLET ORAL at 05:47

## 2022-02-27 RX ADMIN — IPRATROPIUM BROMIDE AND ALBUTEROL SULFATE 3 ML: .5; 2.5 SOLUTION RESPIRATORY (INHALATION) at 15:12

## 2022-02-27 RX ADMIN — BUDESONIDE AND FORMOTEROL FUMARATE DIHYDRATE 2 PUFF: 160; 4.5 AEROSOL RESPIRATORY (INHALATION) at 17:35

## 2022-02-27 RX ADMIN — PREDNISONE 40 MG: 20 TABLET ORAL at 17:43

## 2022-02-27 RX ADMIN — WARFARIN SODIUM 6 MG: 6 TABLET ORAL at 17:43

## 2022-02-27 ASSESSMENT — PATIENT HEALTH QUESTIONNAIRE - PHQ9
1. LITTLE INTEREST OR PLEASURE IN DOING THINGS: NOT AT ALL
1. LITTLE INTEREST OR PLEASURE IN DOING THINGS: NOT AT ALL
SUM OF ALL RESPONSES TO PHQ9 QUESTIONS 1 AND 2: 0
SUM OF ALL RESPONSES TO PHQ9 QUESTIONS 1 AND 2: 0
2. FEELING DOWN, DEPRESSED, IRRITABLE, OR HOPELESS: NOT AT ALL
2. FEELING DOWN, DEPRESSED, IRRITABLE, OR HOPELESS: NOT AT ALL

## 2022-02-27 ASSESSMENT — ENCOUNTER SYMPTOMS
HEARTBURN: 0
PHOTOPHOBIA: 0
DEPRESSION: 0
NERVOUS/ANXIOUS: 1
DIZZINESS: 0
SPEECH CHANGE: 0
FEVER: 0
DIARRHEA: 0
VOMITING: 0
SHORTNESS OF BREATH: 1
EYE PAIN: 0
CHILLS: 0
NAUSEA: 0
COUGH: 1
ABDOMINAL PAIN: 0
HEADACHES: 0
SORE THROAT: 0
DOUBLE VISION: 0
SPUTUM PRODUCTION: 0
WHEEZING: 1
SENSORY CHANGE: 0
FOCAL WEAKNESS: 0
FALLS: 0
BLURRED VISION: 0
NECK PAIN: 0
MYALGIAS: 0
PALPITATIONS: 0
HALLUCINATIONS: 0
SINUS PAIN: 0

## 2022-02-27 ASSESSMENT — LIFESTYLE VARIABLES
HAVE YOU EVER FELT YOU SHOULD CUT DOWN ON YOUR DRINKING: NO
TOTAL SCORE: 0
ON A TYPICAL DAY WHEN YOU DRINK ALCOHOL HOW MANY DRINKS DO YOU HAVE: 0
TOTAL SCORE: 0
TOTAL SCORE: 0
EVER HAD A DRINK FIRST THING IN THE MORNING TO STEADY YOUR NERVES TO GET RID OF A HANGOVER: NO
HAVE PEOPLE ANNOYED YOU BY CRITICIZING YOUR DRINKING: NO
AVERAGE NUMBER OF DAYS PER WEEK YOU HAVE A DRINK CONTAINING ALCOHOL: 0
DOES PATIENT WANT TO STOP DRINKING: NO
CONSUMPTION TOTAL: NEGATIVE
EVER FELT BAD OR GUILTY ABOUT YOUR DRINKING: NO
HOW MANY TIMES IN THE PAST YEAR HAVE YOU HAD 5 OR MORE DRINKS IN A DAY: 0
ALCOHOL_USE: NO

## 2022-02-27 ASSESSMENT — CHA2DS2 SCORE
VASCULAR DISEASE: NO
CHF OR LEFT VENTRICULAR DYSFUNCTION: NO
SEX: FEMALE
HYPERTENSION: YES
CHA2DS2 VASC SCORE: 2
AGE 75 OR GREATER: NO
DIABETES: NO
AGE 65 TO 74: NO
PRIOR STROKE OR TIA OR THROMBOEMBOLISM: NO

## 2022-02-27 ASSESSMENT — PAIN DESCRIPTION - PAIN TYPE
TYPE: ACUTE PAIN

## 2022-02-27 ASSESSMENT — FIBROSIS 4 INDEX: FIB4 SCORE: 0.34

## 2022-02-27 NOTE — CARE PLAN
The patient is Watcher - Medium risk of patient condition declining or worsening    Shift Goals  Clinical Goals: monitor o2, rt, stress test AM, echo  Patient Goals: safety. comfort  Family Goals: n/a    Progress made toward(s) clinical / shift goals:    Problem: Knowledge Deficit - Standard  Goal: Patient and family/care givers will demonstrate understanding of plan of care, disease process/condition, diagnostic tests and medications  Outcome: Progressing     Problem: Knowledge Deficit - COPD  Goal: Patient/significant other demonstrates understanding of disease process, utilization of the Action Plan, medications and discharge instruction  Outcome: Progressing     Problem: Risk for Infection - COPD  Goal: Patient will remain free from signs and symptoms of infection  Outcome: Progressing     Problem: Nutrition - Advanced  Goal: Patient will display progressive weight gain toward goal have adequate food and fluid intake  Outcome: Progressing     Problem: Ineffective Airway Clearance  Goal: Patient will maintain patent airway with clear/clearing breath sounds  Outcome: Progressing     Problem: Impaired Gas Exchange  Goal: Patient will demonstrate improved ventilation and adequate oxygenation and participate in treatment regimen within the level of ability/situation.  Outcome: Progressing     Problem: Risk for Aspiration  Goal: Patient's risk for aspiration will be absent or decrease  Outcome: Progressing     Problem: Self Care  Goal: Patient will have the ability to perform ADLs independently or with assistance (bathe, groom, dress, toilet and feed)  Outcome: Progressing     Problem: Skin Integrity  Goal: Skin integrity is maintained or improved  Outcome: Progressing       Patient is not progressing towards the following goals:

## 2022-02-27 NOTE — PROGRESS NOTES
I picked this patient up from ER and brought her to unit via gurney with belongings on a cardiac monitor. Patient was able to stand and pivot from gurney to bed with the assistance of one and became short of breath on exertion.  I have assumed care of this patient. Patient has been assessed (see flow sheet) and plan of care has been discussed. Patient verbalizes understanding of plan and denies any further needs at this time. Patient is now resting in bed, bed is in the lowest position and locked, and the call light is within reach.

## 2022-02-27 NOTE — ED TRIAGE NOTES
"Chief Complaint   Patient presents with   • Chest Pain     Describes pain as sharp, started about 1630. Denies any new SOB   • Weakness     Bilat arms   • Nausea     Started around 1600   • Flank Pain     States her left side hurts after she urinates     Pt called [facility] with multiple complaints. EMS gave pt 324mg of ASA.    Pt is A&Ox4 and GCS 15.     Hx of HTN, asthma, bipolar & schizophrenic    /74   Pulse 89   Temp 36.2 °C (97.2 °F) (Oral)   Resp 18   Ht 1.575 m (5' 2\")   Wt (!) 132 kg (291 lb 14.2 oz)   LMP  (LMP Unknown)   SpO2 95%   BMI 53.39 kg/m²     "

## 2022-02-27 NOTE — ED PROVIDER NOTES
ED Provider Note     Scribed for [place] Mora D.O. by Myles Bennett. 2/26/2022, 8:55 PM.     Primary care provider: SHANKAR Gleason  Means of arrival: EMS         History obtained from: Patient  History limited by: None    CHIEF COMPLAINT  Chief Complaint   Patient presents with   • Chest Pain     Describes pain as sharp, started about 1630. Denies any new SOB   • Weakness     Bilat arms   • Nausea     Started around 1600   • Flank Pain     States her left side hurts after she urinates       HPI  Lorene Haro is a 46 y.o. female who presents to the emergency Department via EMS for acute, moderate chest pain onset 4:30 PM today. Per patient, her chest pain radiates from the front to the back. She reports she began experiencing nausea and shortness of breath. She rates her pain as 6/10. She has associated symptoms of dysuria, coughing, and weakness in the bilateral upper extremities, but denies vomiting, diarrhea, or fever. She was given a baby aspirin en route by EMS with mild alleviation.    REVIEW OF SYSTEMS  Pertinent positives include chest pain, nausea, shortness of breath, dysuria, coughing, and weakness in the bilateral upper extremities. Pertinent negatives include no vomiting, diarrhea, or fever.   See HPI for further details. All other systems are negative.    PAST MEDICAL HISTORY  Past Medical History:   Diagnosis Date   • Asthma    • Bipolar 2 disorder (HCC)    • Chickenpox    • Chronic obstructive pulmonary disease (HCC)    • Hypertension    • On home oxygen therapy    • Other psychological stress    • Schizophrenic disorder (HCC)    • Yeast infection of the skin 4/1/2021       FAMILY HISTORY  Family History   Problem Relation Age of Onset   • No Known Problems Mother    • Cancer Sister        SOCIAL HISTORY  Social History     Tobacco Use   • Smoking status: Current Every Day Smoker     Years: 20.00     Types: Cigarettes   • Smokeless tobacco: Never Used   • Tobacco comment: 1 cigarette/day     Vaping Use   • Vaping Use: Never used   Substance Use Topics   • Alcohol use: No   • Drug use: No      Social History     Substance and Sexual Activity   Drug Use No       SURGICAL HISTORY  Past Surgical History:   Procedure Laterality Date   • CHOLECYSTECTOMY     • COLECTOMY     • HYSTERECTOMY LAPAROSCOPY     • KNEE ARTHROSCOPY Left        CURRENT MEDICATIONS  No current facility-administered medications for this encounter.    Current Outpatient Medications:   •  montelukast (SINGULAIR) 10 MG Tab, TAKE 1 TABLET BY MOUTH EVERY NIGHT AT BEDTIME, Disp: 90 Tablet, Rfl: 3  •  budesonide-formoterol (SYMBICORT) 160-4.5 MCG/ACT Aerosol, Inhale 2 Puffs 2 times a day., Disp: 3 Each, Rfl: 3  •  fluticasone (FLONASE) 50 MCG/ACT nasal spray, Administer 1-2 Sprays into affected nostril(S) 2 times a day. Patient states administers 2 sprays twice daily, Disp: 16 g, Rfl: 3  •  tiotropium (SPIRIVA HANDIHALER) 18 MCG Cap, Place 1 Capsule into inhaler and inhale every day., Disp: 30 Capsule, Rfl: 3  •  warfarin (COUMADIN) 6 MG Tab, Take one to one and one-half (1-1.5) tablets daily as directed by [facility], Disp: 135 Tablet, Rfl: 1  •  ondansetron (ZOFRAN ODT) 4 MG TABLET DISPERSIBLE, Take 1 Tablet by mouth every 8 hours as needed for Nausea., Disp: 10 Tablet, Rfl: 0  •  ondansetron (ZOFRAN) 4 MG Tab tablet, Take 1 Tablet by mouth every four hours as needed for Nausea/Vomiting., Disp: 20 Tablet, Rfl: 0  •  acetaminophen (TYLENOL) 325 MG Tab, Take 325 mg by mouth every 8 hours as needed (Generalized pain). Prescription is for 4 tabs every 8 hours prn, patient states she takes 2 tabls every 4 hours prn, Disp: , Rfl:   •  nicotine (NICODERM) 14 MG/24HR PATCH 24 HR, Place 1 Patch on the skin every 24 hours. (Patient not taking: Reported on 2/11/2022), Disp: 30 Patch, Rfl: 0  •  atorvastatin (LIPITOR) 20 MG Tab, Take 20 mg by mouth every day. (Patient not taking: Reported on 2/11/2022), Disp: , Rfl:   •  NON  "SPECIFIED, Apply 1 Application topically 1 time a day as needed (Rash under breasts). Anti-fungal powder, Disp: , Rfl:   •  traZODone (DESYREL) 50 MG Tab, Take  mg by mouth every day., Disp: , Rfl:   •  Home Care Oxygen, Inhale 2 L/min as needed. Oxygen is at 2L/NC PRN and QHS Oxygen dose range: 2 L/min Respiratory route via: Nasal Cannula  Oxygen supplier:[employer]  , Disp: , Rfl:   •  famotidine (PEPCID) 40 MG Tab, Take 40 mg by mouth every day. Indications: Heartburn, Disp: , Rfl:   •  nitroglycerin (NITROSTAT) 0.4 MG SL Tab, Place 1 tablet under the tongue as needed for Chest Pain., Disp: 25 tablet, Rfl: 0  •  albuterol 108 (90 Base) MCG/ACT Aero Soln inhalation aerosol, Inhale 2 Puffs every 6 hours as needed for Shortness of Breath., Disp: 8.5 g, Rfl: 0  •  aripiprazole (ABILIFY) 30 MG tablet, Take 30 mg by mouth every morning., Disp: , Rfl:     ALLERGIES  Allergies   Allergen Reactions   • Amoxicillin Rash and Itching     Tolerates cephalosporins, pt reports that she gets a rash all over her body and gets itchy   • Penicillin G Rash and Itching     pt reports that she gets a rash all over her body and gets itchy       PHYSICAL EXAM  VITAL SIGNS: /74   Pulse 89   Temp 36.2 °C (97.2 °F) (Oral)   Resp 18   Ht 1.575 m (5' 2\")   Wt (!) 132 kg (291 lb 14.2 oz)   LMP  (LMP Unknown)   SpO2 95%   BMI 53.39 kg/m²     Constitutional: Patient is well developed, well nourished. Non-toxic appearing. Mild distress.   HENT: Normocephalic, atraumatic. Facial skin is very brady. Moist oral mucosa.  Cardiovascular: Normal heart rate and Regular rhythm. No murmur,  Good heart tones.  Thorax & Lungs: Diminished air exchange with expiratory wheezing diffusely  Abdomen: Morbidly obese, Bowel sounds normal in all four quadrants. Soft,nontender, no rebound , guarding, palpable masses.   Skin: Warm, Dry, Chronic venous stasis dermatitis changes of the bilateral lower extremities. Facial skin is very " brady.  Extremities: Peripheral pulses 4/4, 3+ bilateral lower extremity edema, No tenderness   Musculoskeletal: Normal range of motion in all major joints. No tenderness to palpation or major deformities noted.   Neurologic: Alert & oriented x 3, Normal motor function, Normal sensory function.  Psychiatric: Very flat affect, Judgment impaired due to patient's slow mental status, Mood normal.     DIAGNOSTICS/PROCEDURES    LABS  Results for orders placed or performed during the hospital encounter of 02/26/22   CBC with Differential   Result Value Ref Range    WBC 11.4 (H) 4.8 - 10.8 K/uL    RBC 5.09 4.20 - 5.40 M/uL    Hemoglobin 12.9 12.0 - 16.0 g/dL    Hematocrit 43.0 37.0 - 47.0 %    MCV 84.5 81.4 - 97.8 fL    MCH 25.3 (L) 27.0 - 33.0 pg    MCHC 30.0 (L) 33.6 - 35.0 g/dL    RDW 58.0 (H) 35.9 - 50.0 fL    Platelet Count 257 164 - 446 K/uL    MPV 10.9 9.0 - 12.9 fL    Neutrophils-Polys 76.40 (H) 44.00 - 72.00 %    Lymphocytes 15.50 (L) 22.00 - 41.00 %    Monocytes 6.30 0.00 - 13.40 %    Eosinophils 1.00 0.00 - 6.90 %    Basophils 0.10 0.00 - 1.80 %    Immature Granulocytes 0.70 0.00 - 0.90 %    Nucleated RBC 0.00 /100 WBC    Neutrophils (Absolute) 8.69 (H) 2.00 - 7.15 K/uL    Lymphs (Absolute) 1.76 1.00 - 4.80 K/uL    Monos (Absolute) 0.71 0.00 - 0.85 K/uL    Eos (Absolute) 0.11 0.00 - 0.51 K/uL    Baso (Absolute) 0.01 0.00 - 0.12 K/uL    Immature Granulocytes (abs) 0.08 0.00 - 0.11 K/uL    NRBC (Absolute) 0.00 K/uL   Complete Metabolic Panel (CMP)   Result Value Ref Range    Sodium 142 135 - 145 mmol/L    Potassium 4.3 3.6 - 5.5 mmol/L    Chloride 102 96 - 112 mmol/L    Co2 30 20 - 33 mmol/L    Anion Gap 10.0 7.0 - 16.0    Glucose 155 (H) 65 - 99 mg/dL    Bun 10 8 - 22 mg/dL    Creatinine 0.82 0.50 - 1.40 mg/dL    Calcium 9.6 8.5 - 10.5 mg/dL    AST(SGOT) 6 (L) 12 - 45 U/L    ALT(SGPT) 11 2 - 50 U/L    Alkaline Phosphatase 142 (H) 30 - 99 U/L    Total Bilirubin 0.5 0.1 - 1.5 mg/dL    Albumin 4.0 3.2 - 4.9 g/dL     Total Protein 7.2 6.0 - 8.2 g/dL    Globulin 3.2 1.9 - 3.5 g/dL    A-G Ratio 1.3 g/dL   Troponin   Result Value Ref Range    Troponin T 25 (H) 6 - 19 ng/L   LIPASE   Result Value Ref Range    Lipase 27 11 - 82 U/L   URINALYSIS    Specimen: Urine   Result Value Ref Range    Color Yellow     Character Clear     Specific Gravity 1.011 <1.035    Ph 6.0 5.0 - 8.0    Glucose Negative Negative mg/dL    Ketones Negative Negative mg/dL    Protein Negative Negative mg/dL    Bilirubin Negative Negative    Urobilinogen, Urine 0.2 Negative    Nitrite Negative Negative    Leukocyte Esterase Negative Negative    Occult Blood Negative Negative    Micro Urine Req see below    ESTIMATED GFR   Result Value Ref Range    GFR If African American >60 >60 mL/min/1.73 m 2    GFR If Non African American >60 >60 mL/min/1.73 m 2   TROPONIN   Result Value Ref Range    Troponin T 25 (H) 6 - 19 ng/L   proBrain Natriuretic Peptide, NT   Result Value Ref Range    NT-proBNP 195 (H) 0 - 125 pg/mL   EKG   Result Value Ref Range    Report       [facility]    Test Date:  2022  Pt Name:    ADELINA MILLAN                Department: ER  MRN:        [id number]                      Room:  Gender:     Female                       Technician: [id number]  :        [id number]                   Requested By:ER TRIAGE PROTOCOL  Order #:    [id number]                    Reading MD:    Measurements  Intervals                                Axis  Rate:       87                           P:          73  ME:         136                          QRS:        62  QRSD:       98                           T:          22  QT:         356  QTc:        429    Interpretive Statements  SINUS RHYTHM  BASELINE WANDER IN LEAD(S) V4  Compared to ECG 2022 15:53:41  Possible ischemia no longer present       Labs reviewed by me    EKG  12 Lead EKG interpreted by me as shown above.    RADIOLOGY/PROCEDURES  DX-CHEST-PORTABLE (1 VIEW)   Final  Result      1.  Stable cardiomegaly.   2.  No acute abnormality.        Results and radiologist interpretation reviewed by me.     COURSE & MEDICAL DECISION MAKING  Pertinent Labs & Imaging studies reviewed. (See chart for details)    8:55 PM - Patient seen and evaluated at bedside. Ordered for DX-Chest, proBrain Natriuretic Peptide, Troponin, Estimated GFR, CBC w/ Diff, CMP, Troponin, Lipase, UA, and EKG to evaluate. Patient will be treated with albuterol and Deltasone 40 mg PO for her symptoms. Differential diagnoses include, but are not limited to, Coronary Artery Disease vs. COPD Exacerbation vs. UTI    Initial troponin was elevated at twenty-five it was repeated after 2-hours and remains elevated.  Chest x-ray was unremarkable, twelve-lead EKG shows sinus rhythm at a rate of 87 bpm with no acute ST elevation or depression.  Patient showed mild improvement.  10:34 PM - Patient was reevaluated at bedside. She has not received her breathing treatment at this time.    11:27 PM - Paged Hospitalist    11:45 PM - I discussed the patient's case and the above findings with Dr. Schrader (Hospitalist) who agrees to admit the patient for further evaluation.        DISPOSITION:  Patient will be hospitalized by Dr. Schrader in guarded condition.    FINAL IMPRESSION  1. Acute chest pain    2. Weakness    3. Pain in both upper extremities    4. Elevated troponin    5. Nausea      Myles HARRIS (Ade), am scribing for, and in the presence of, [place] Mora D.O..    Electronically signed by: Myles Bennett (Ade), 2/26/2022    [place] HARRIS D.O. personally performed the services described in this documentation, as scribed by Myles Bennett in my presence, and it is both accurate and complete.    The note accurately reflects work and decisions made by me.  [place] Mora D.O.  2/27/2022  3:09 AM

## 2022-02-27 NOTE — ASSESSMENT & PLAN NOTE
EKG no ischemic changes  Risk Factors (ageM>45 F>55, smoking, HTN, HLD, Obesity, DM, Metabolic syndrome, FH, sedentary, stress, drug use, h/o preeclampsia, autoimmune)    NST: No evidence of significant jeopardized viable myocardium or prior myocardial infarction. Normal left ventricular size, ejection fraction, and wall motion  Echo: no regional wall motion abnormalities, LEVF 70%, RVSP 27    Supplemental O2 to keep SPO2 greater than 90%    ACS is unlikely at this point

## 2022-02-27 NOTE — ED NOTES
Med rec updated and complete. Allergies reviewed. Confirmed name and date of birth.    Pt denies antibiotic use in last 30 days.      [place] [facility] [phone]

## 2022-02-27 NOTE — PROGRESS NOTES
Inpatient Anticoagulation Service Note    Date: 2022  Reason for Anticoagulation: Atrial Fibrillation   [id number] VASc Score: 2  HAS-BLED Score: 2    Hemoglobin Value: 12.9  Hematocrit Value: 43  Lab Platelet Value: 257  Target INR: 2.0 to 3.0    INR from last 7 days     Date/Time INR Value    22 0200 2.57        Dose from last 7 days     Date/Time Dose (mg)    22 0250 6        Average Dose (mg): 42 (42mg weekly)  Significant Interactions: Aspirin,Corticosteroids,Statin,Antibiotics  Bridge Therapy: No     Comments: Pt was admitted for COPD exacerbation, on warfarin for a-fib with INR goal of 2-3. INR was 2.57 today therapeutic. H/h plts within normal range, no overt bleeding per chart review. Patient corticosteroids may increase INR.    Plan:  6mg daily. Giving 6mg in am and 6mg in pm due to not taking dose previous day. INR daily ordered.   Education Material Provided?: No  Pharmacist suggested discharge dosinmg daily. INR in 3 days.      Lamine Palmer, PharmD

## 2022-02-27 NOTE — CONSULTS
Pulmonary Consultation    Date of Service: 2/27/2022    Date of Admission:  2/26/2022  8:10 PM    Consulting Provider:  Maldonado Taylor M.D.     Chief Complaint:  Chest Pain (Describes pain as sharp, started about 1630. Denies any new SOB), Weakness (Bilat arms), Nausea (Started around 1600), and Flank Pain (States her left side hurts after she urinates)      History of Present Illness/Hospital Course:   Lorene Haro is a 46 y.o. female with a past medical history of mixed obstructive/restrictive lung disease (FEV1/FVC 86%, FVC 46%, TLC 78%), chronic respiratory failure on 3L, morbid obesity, obstructive sleep apnea (not using CPAP currently), obesity hypoventilation syndrome, previous Covid infection 10/2021, psych disorder, atrial fibrillation, chronic anticoagulation who was admitted 2/26/2022 with chest tightness, cough and shortness of breath.  Unfortunately history could not be obtained from the patient after she had returned from the chemical stress test for an ACS rule out, was found to be somnolent and only responsive intermittently to verbal stimuli.  Pulmonology team was consulted due to the concerns of COPD exacerbation. Patient is well-known to pulmonology, last seen in office 12/16/2021.  Patient was admitted for ACS rule out, COPD exacerbation and started on azithromycin, prednisone 40 mg, DuoNeb treatments.    CXR impression without acute abnormalities.   Hospitalist-Dr. Taylor notified of patient's somnolence and obtundation, requested to order blood gas.      Review of Systems   Unable to perform ROS: Mental acuity       Home Medications     Reviewed by Lissy Mane R.N. (Registered Nurse) on 02/27/22 at 0206  Med List Status: Complete   Medication Last Dose Status   acetaminophen (TYLENOL) 325 MG Tab  Active   albuterol 108 (90 Base) MCG/ACT Aero Soln inhalation aerosol 2/26/2022 Active   aripiprazole (ABILIFY) 30 MG tablet 2/25/2022 Active   atorvastatin (LIPITOR) 20 MG Tab 2/25/2022 Active  "  budesonide-formoterol (SYMBICORT) 160-4.5 MCG/ACT Aerosol 2/26/2022 Active   famotidine (PEPCID) 40 MG Tab 2/25/2022 Active   fluticasone (FLONASE) 50 MCG/ACT nasal spray 2/25/2022 Active   montelukast (SINGULAIR) 10 MG Tab 2/25/2022 Active   nitroglycerin (NITROSTAT) 0.4 MG SL Tab 2/26/2022 Active   tiotropium (SPIRIVA HANDIHALER) 18 MCG Cap 2/26/2022 Active   traZODone (DESYREL) 50 MG Tab 2/25/2022 Active   warfarin (COUMADIN) 6 MG Tab 2/25/2022 Active                Social History     Tobacco Use   • Smoking status: Current Every Day Smoker     Years: 20.00     Types: Cigarettes   • Smokeless tobacco: Never Used   • Tobacco comment: 1 cigarette/day    Vaping Use   • Vaping Use: Never used   Substance Use Topics   • Alcohol use: No   • Drug use: No        Past Medical History:   Diagnosis Date   • Asthma    • Bipolar 2 disorder (HCC)    • Chickenpox    • Hypertension    • On home oxygen therapy    • Other psychological stress    • Schizophrenic disorder (HCC)    • Yeast infection of the skin 4/1/2021       Past Surgical History:   Procedure Laterality Date   • CHOLECYSTECTOMY     • COLECTOMY     • HYSTERECTOMY LAPAROSCOPY     • KNEE ARTHROSCOPY Left        Allergies: Penicillin g and Amoxicillin    Family History   Problem Relation Age of Onset   • No Known Problems Mother    • Cancer Sister        Pulmonary-Specific Vital Signs  Vitals  Blood Pressure: 134/63  Temperature: 36.2 °C (97.1 °F)  Temp src: Temporal  Pulse: 77  Respiration: 17  Pulse Oximetry: 94 %  Height: 154.9 cm (5' 1\")  Weight: (!) 133 kg (292 lb 15.9 oz)    Physical Examination  Physical Exam  Vitals and nursing note reviewed.   Constitutional:       Appearance: She is obese.      Comments: Somnolent female, responds to loud verbal stimuli then goes back to sleep.   HENT:      Head: Normocephalic.   Eyes:      Extraocular Movements: Extraocular movements intact.      Pupils: Pupils are equal, round, and reactive to light.   Cardiovascular:      " Rate and Rhythm: Normal rate and regular rhythm.      Pulses: Normal pulses.      Heart sounds: Normal heart sounds.   Pulmonary:      Effort: No respiratory distress.      Breath sounds: No wheezing.      Comments: Diminished bilateral lower lung fields  Abdominal:      General: There is no distension.      Palpations: Abdomen is soft.      Tenderness: There is no abdominal tenderness.   Musculoskeletal:         General: No swelling, tenderness or deformity.   Skin:     Coloration: Skin is not jaundiced.   Neurological:      Comments: Unable to a certain as patient obtunded             Intake/Output Summary (Last 24 hours) at 2/27/2022 1405  Last data filed at 2/27/2022 0300  Gross per 24 hour   Intake --   Output 600 ml   Net -600 ml       Recent Labs     02/26/22  1857   WBC 11.4*   NEUTSPOLYS 76.40*   LYMPHOCYTES 15.50*   MONOCYTES 6.30   EOSINOPHILS 1.00   BASOPHILS 0.10   ASTSGOT 6*   ALTSGPT 11   ALKPHOSPHAT 142*   TBILIRUBIN 0.5     Recent Labs     02/26/22  1857 02/27/22  0200   SODIUM 142  --    POTASSIUM 4.3  --    CHLORIDE 102  --    CO2 30  --    BUN 10  --    CREATININE 0.82  --    MAGNESIUM  --  2.3   PHOSPHORUS  --  4.3   CALCIUM 9.6  --      Recent Labs     02/26/22  1857   ALTSGPT 11   ASTSGOT 6*   ALKPHOSPHAT 142*   TBILIRUBIN 0.5   LIPASE 27   GLUCOSE 155*     Recent Labs     02/27/22  1307   EDAAL48M 7.31*   [id number] 57.1*   YUVNB777T 77.4   PKTQ1ZLW 94.6   ARTHCO3 28*   ARTBE 1     EC-ECHOCARDIOGRAM COMPLETE W/ CONT   Final Result      NM-CARDIAC STRESS TEST   Final Result      DX-CHEST-PORTABLE (1 VIEW)   Final Result      1.  Stable cardiomegaly.   2.  No acute abnormality.          Patient Active Problem List   Diagnosis   • Bipolar Disorder   • Cardiomegaly   • KATHIA (obstructive sleep apnea)   • Iron deficiency anemia   • Acute bronchitis   • Leukocytosis   • Debilitated   • Class 3 severe obesity in adult (HCC)   • Macrocytic anemia   • Tobacco use   • Pain in the chest   • Schizophrenia  (HCC)   • Elevated brain natriuretic peptide (BNP) level   • Atrial fibrillation (HCC)   • Blood glucose elevated   • Yeast infection of the skin   • Lower extremity edema   • Anxiety   • Hyperglycemia   • HLD (hyperlipidemia)   • Nicotine dependence   • Obesity hypoventilation syndrome (HCC)   • Severe persistent asthma without complication   • Disorder of nail   • Lobar pneumonia (HCC)   • Chronic hypoxic respiratory failure (HCC)   • Pneumonia due to COVID-19 virus   • Secondary hypercoagulable state (HCC)   • Chest pain, rule out acute myocardial infarction       Assessment and Recommendations:    1. Chronic hypoxic respiratory failure  2. Mixed obstructive/restrictive lung disease  3. Obstructive sleep apnea-not using CPAP  4. Obesity hypoventilation syndrome  5.  ACS rule out    -Patient somnolent this morning after the chemical stress test for ACS rule out when seen at bedside.  She has not been wearing her CPAP at night and currently concerning for CO2 narcosis.  We have requested hospitalist-Dr. Taylor for stat blood gas.  If hypercapnic will likely require BiPAP therapy [place] [facility] [place]  -Can finish 3 days of Azithromycin and 5 days of steroid course.  Resume home Symbicort, Spiriva in addition to DuoNeb treatments while inpatient.  -Will need to have appt made for sleep medicine follow up at the time of discharge to reinitiate CPAP therapy.       Manuel Laird M.D.,    The patient was seen and plan discussed with my attending, Dr. Fonseca.

## 2022-02-27 NOTE — WOUND TEAM
Wound team consulted for cesar's bilateral lower legs, per patient her legs have looked that way for years., and they do not bother her.  +2 bilateral pulses palpable.  Asked patient if she would like some lotion and patient refused.  No advance wound care needed at this time.  Please re-consult as needed.  Thank you

## 2022-02-27 NOTE — PROGRESS NOTES
Lab called with critical result of PCO2 at 57.1. Critical lab result read back to lab.   Dr. Taylor notified of critical lab result at 1335.  Critical lab result read back by Dr. Taylor.

## 2022-02-27 NOTE — PROGRESS NOTES
4 Eyes Skin Assessment Completed by Lissy BETHEA RN and Zoe WALL RN.    Head WDL  Ears Redness and Blanching  Nose Redness  Mouth WDL  Neck WDL  Breast/Chest WDL  Shoulder Blades WDL  Spine Blanching and Bruising  (R) Arm/Elbow/Hand Redness and Swelling  (L) Arm/Elbow/Hand Redness and Swelling  Abdomen WDL  Groin WDL  Scrotum/Coccyx/Buttocks Blanching  (R) Leg Redness, Shiny and Edema Calloused   (L) Leg Redness, Shiny and Edema Calloused  (R) Heel/Foot/Toe Redness, Blanching and Edema Calloused  (L) Heel/Foot/Toe Redness, Blanching and Edema Calloused          Devices In Places Tele Box, Blood Pressure Cuff and Pulse Ox      Interventions In Place NC W/Ear Foams, Waffle Overlay, Pillows and Heels Loaded W/Pillows    Possible Skin Injury Yes    Pictures Uploaded Into Epic Yes  Wound Consult Placed Yes  RN Wound Prevention Protocol Ordered No

## 2022-02-27 NOTE — H&P
Hospital Medicine History & Physical Note    Date of Service  2/26/2022    Primary Care Physician  Mallika Sharp M.D.    Consultants  None excision    Code Status  Full Code    Chief Complaint  Chief Complaint   Patient presents with   • Chest Pain     Describes pain as sharp, started about 1630. Denies any new SOB   • Weakness     Bilat arms   • Nausea     Started around 1600   • Flank Pain     States her left side hurts after she urinates       History of Presenting Illness  Lorene Haro is a 46 y.o. obese female former smoker with history of severe persistent asthma, obesity hypoventilation syndrome, chronic respiratory failure on 2 L nasal cannula, atrial fibrillation on warfarin, schizophrenia on Abilify, hyperlipidemia on statin, venous stasis dermatitis with chronic bilateral lower extremity edema, who presented 2/26/2022 with chest pain, nausea, chest tightness and dyspnea, increasing cough.  Patient notes for the past 3 to 4 days she has been having increasing cough with mildly increased sputum production, chest tightness and wheezing.  She has been using her maintenance asthma/COPD medications as well as her rescue albuterol inhaler with minimal improvement.  On the day of admission she began experiencing chest pain starting around 4:30 PM today, pain was moderate in severity located in the center of her chest substernal radiating to the back aggravated by coughing and exertion, associated with nausea, no associated dizziness or vomiting.  No alleviating factors.  Due to the ongoing nature of the chest pain and dyspnea she presented for evaluation.  She denies any headache or vision changes, vomiting, abdominal pain, dysuria or diarrhea.    In the ED she was afebrile, normal pulse, tachypneic, normal blood pressure, she was requiring 4 L nasal cannula to maintain oxygen saturations greater than 90%.  Initial work-up shows mild leukocytosis, CMP without significant electrolyte abnormalities, normal  renal function, normal liver function, troponin T 25, proBNP 195, UA unremarkable, INR 2.57.  Patient was treated with breathing treatments and subsequently referred to hospitalist for admission.    I discussed the plan of care with patient, bedside RN and pharmacy.    Review of Systems  Review of Systems   Constitutional: Positive for malaise/fatigue. Negative for chills and fever.   HENT: Negative for nosebleeds and sinus pain.    Eyes: Negative for photophobia and pain.   Respiratory: Positive for cough, shortness of breath and wheezing.    Cardiovascular: Positive for chest pain and leg swelling.   Gastrointestinal: Negative for abdominal pain, diarrhea, nausea and vomiting.   Genitourinary: Negative for dysuria and urgency.   Musculoskeletal: Negative for falls and joint pain.   Neurological: Negative for sensory change, speech change and focal weakness.   Psychiatric/Behavioral: Negative for hallucinations. The patient is nervous/anxious.        Past Medical History   has a past medical history of Asthma, Bipolar 2 disorder ([facility]), Chickenpox, Chronic obstructive pulmonary disease (HCC), Hypertension, On home oxygen therapy, Other psychological stress, Schizophrenic disorder ([facility]), and Yeast infection of the skin (4/1/2021).    Surgical History   has a past surgical history that includes hysterectomy laparoscopy; colectomy; cholecystectomy; and knee arthroscopy (Left).     Family History  family history includes Cancer in her sister; No Known Problems in her mother.        Social History   reports that she has been smoking cigarettes. She has smoked for the past 20.00 years. She has never used smokeless tobacco. She reports that she does not drink alcohol and does not use drugs.    Allergies  Allergies   Allergen Reactions   • Penicillin G Rash and Itching     pt reports that she gets a rash all over her body and gets itchy   • Amoxicillin Rash and Itching     Tolerates cephalosporins, pt reports that she gets  a rash all over her body and gets itchy       Medications  Prior to Admission Medications   Prescriptions Last Dose Informant Patient Reported? Taking?   acetaminophen (TYLENOL) 325 MG Tab  Patient Yes No   Sig: Take 325 mg by mouth every 8 hours as needed (Generalized pain). Prescription is for 4 tabs every 8 hours prn, patient states she takes 2 tabls every 4 hours prn   albuterol 108 (90 Base) MCG/ACT Aero Soln inhalation aerosol 2/26/2022 at 1000 Patient No No   Sig: Inhale 2 Puffs every 6 hours as needed for Shortness of Breath.   aripiprazole (ABILIFY) 30 MG tablet 2/25/2022 at 0900 Patient Yes No   Sig: Take 30 mg by mouth every morning.   atorvastatin (LIPITOR) 20 MG Tab 2/25/2022 at 2100 Patient Yes No   Sig: Take 20 mg by mouth at bedtime.   budesonide-formoterol (SYMBICORT) 160-4.5 MCG/ACT Aerosol 2/26/2022 at 1000 Patient No No   Sig: Inhale 2 Puffs 2 times a day.   famotidine (PEPCID) 40 MG Tab 2/25/2022 at 0900 Patient Yes No   Sig: Take 40 mg by mouth every day. Indications: Heartburn   fluticasone (FLONASE) 50 MCG/ACT nasal spray 2/25/2022 at 0900 Patient No No   Sig: Administer 1-2 Sprays into affected nostril(S) 2 times a day. Patient states administers 2 sprays twice daily   montelukast (SINGULAIR) 10 MG Tab 2/25/2022 at 2100 Patient No No   Sig: TAKE 1 TABLET BY MOUTH EVERY NIGHT AT BEDTIME   nitroglycerin (NITROSTAT) 0.4 MG SL Tab 2/26/2022 at 1500 Patient No No   Sig: Place 1 tablet under the tongue as needed for Chest Pain.   tiotropium (SPIRIVA HANDIHALER) 18 MCG Cap 2/26/2022 at 1000 Patient No No   Sig: Place 1 Capsule into inhaler and inhale every day.   traZODone (DESYREL) 50 MG Tab 2/25/2022 at 2100 Patient Yes No   Sig: Take 100 mg by mouth at bedtime.   warfarin (COUMADIN) 6 MG Tab 2/25/2022 at 1730 Patient No No   Sig: Take one to one and one-half (1-1.5) tablets daily as directed by [facility]   Patient taking differently: Take 6 mg by mouth every evening.       Facility-Administered Medications: None       Physical Exam  Temp:  [36.1 °C (96.9 °F)-36.2 °C (97.2 °F)] 36.1 °C (96.9 °F)  Pulse:  [72-89] 83  Resp:  [17-29] 24  BP: (110-125)/(56-74) 121/58  SpO2:  [93 %-100 %] 93 %  Blood Pressure: 125/56   Temperature: 36.2 °C (97.2 °F)   Pulse: 84   Respiration: (!) 29   Pulse Oximetry: 98 %       Physical Exam  Vitals and nursing note reviewed. Exam conducted with a chaperone present.   Constitutional:       General: She is not in acute distress.     Appearance: She is obese. She is not toxic-appearing.      Comments: 46-year-old obese female appears older than stated age, appears chronically ill alert and conversant, unable to speak full sentences without becoming breathless, no work of breathing, nontoxic   HENT:      Head: Normocephalic and atraumatic.      Nose: Nose normal. No rhinorrhea.      Mouth/Throat:      Mouth: Mucous membranes are moist.      Pharynx: Oropharynx is clear.   Eyes:      General: No scleral icterus.     Extraocular Movements: Extraocular movements intact.      Conjunctiva/sclera: Conjunctivae normal.      Pupils: Pupils are equal, round, and reactive to light.   Cardiovascular:      Rate and Rhythm: Normal rate and regular rhythm.      Pulses: Normal pulses.   Pulmonary:      Effort: Pulmonary effort is normal. No respiratory distress.      Breath sounds: Wheezing present. No rhonchi or rales.      Comments: Diminished breath sounds throughout, expiratory wheezes noted diffusely, no rales or rhonchi appreciated, poor inspiratory effort, poor cough  Abdominal:      General: Bowel sounds are normal.      Palpations: Abdomen is soft.      Tenderness: There is no abdominal tenderness. There is no guarding or rebound.   Musculoskeletal:         General: Normal range of motion.      Cervical back: Normal range of motion and neck supple.      Right lower leg: Edema present.      Left lower leg: Edema present.      Comments: Bilateral lower extremity  edema and skin changes secondary to venous stasis dermatitis   Skin:     General: Skin is warm and dry.      Capillary Refill: Capillary refill takes less than 2 seconds.      Findings: Erythema (b/l lower extremities, nontender, does not appear to be cellulitic) present.   Neurological:      General: No focal deficit present.      Mental Status: She is alert and oriented to person, place, and time.      Cranial Nerves: No cranial nerve deficit.      Comments: Face symmetrical, tongue midline, able to move all 4 extremities, no drift noted, no hemineglect or gaze preference, no abnormal movements noted.   Psychiatric:         Mood and Affect: Mood normal.         Behavior: Behavior normal.         Thought Content: Thought content normal.         Judgment: Judgment normal.         Laboratory:  Recent Labs     02/26/22 1857   WBC 11.4*   RBC 5.09   HEMOGLOBIN 12.9   HEMATOCRIT 43.0   MCV 84.5   MCH 25.3*   MCHC 30.0*   RDW 58.0*   PLATELETCT 257   MPV 10.9     Recent Labs     02/26/22 1857   SODIUM 142   POTASSIUM 4.3   CHLORIDE 102   CO2 30   GLUCOSE 155*   BUN 10   CREATININE 0.82   CALCIUM 9.6     Recent Labs     02/26/22 1857   ALTSGPT 11   ASTSGOT 6*   ALKPHOSPHAT 142*   TBILIRUBIN 0.5   LIPASE 27   GLUCOSE 155*     Recent Labs     02/27/22  0200   INR 2.57*     Recent Labs     02/26/22 1857   NTPROBNP 195*         Recent Labs     02/26/22 1857 02/26/22  2148 02/27/22  0200   TROPONINT 25* 25* 24*       Imaging:  DX-CHEST-PORTABLE (1 VIEW)   Final Result      1.  Stable cardiomegaly.   2.  No acute abnormality.      NM-CARDIAC STRESS TEST    (Results Pending)   EC-ECHOCARDIOGRAM COMPLETE W/O CONT    (Results Pending)       X-Ray:  I have personally reviewed the images and compared with prior images. and My impression is: Cardiomegaly with no acute cardiopulmonary abnormality  EKG: I have personally reviewed the images in PACS prior images: My impression is: Sinus rhythm, no ST-T segment elevations or  depressions    Assessment/Plan:  I anticipate this patient is appropriate for observation status at this time.    * Chest pain, rule out acute myocardial infarction- (present on admission)  Assessment & Plan  EKG  Trop  Risk Factors (ageM>45 F>55, smoking, HTN, HLD, Obesity, DM, Metabolic syndrome, FH, sedentary, stress, drug use, h/o preeclampsia, autoimmune)  HEART Score Jeramy Score  Telemetry monitoring  Trend troponin  Repeat EKG with any chest pain  TTE  Stress Test in AM  Supplemental O2 to keep SPO2 greater than 90%  Beta-blocker if no contraindications  ACE inhibitor for all post ACS patients without contraindications      Chronic hypoxic respiratory failure (HCC)- (present on admission)  Assessment & Plan  With mild acute exacerbation.  On 2 L home O2 requiring 3 to 4 L in the ED despite multiple breathing treatments at home, increased cough from baseline.  Maintain sat > 88% for v/q matching  Steroids: P.o. prednisone  Nebs/rx: Scheduled and as needed DuoNebs, continue home Symbicort, Singulair, Spiriva  Azithromycin for 3 days  Check procalcitonin          HLD (hyperlipidemia)- (present on admission)  Assessment & Plan  Continue statin    Atrial fibrillation (HCC)- (present on admission)  Assessment & Plan  Continue warfarin INR goal 2-3  Admitting EKG sinus rhythm normal heart rate        Schizophrenia (HCC)- (present on admission)  Assessment & Plan  Continue Abilify    Bipolar Disorder- (present on admission)  Assessment & Plan  Continue Abilify      VTE prophylaxis: SCDs/TEDs and enoxaparin ppx

## 2022-02-27 NOTE — ASSESSMENT & PLAN NOTE
With mild acute exacerbation.  On 2 L home O2 requiring 3 to 4 L in the ED despite multiple breathing treatments at home, increased cough from baseline.  Maintain sat > 88% for v/q matching  Steroids: P.o. prednisone  Nebs/rx: SchePduled and as needed DuoNebs, continue home Symbicort, Singulair, Spiriva  Azithromycin for 3 days  Procalcitonin wnl

## 2022-02-27 NOTE — CARE PLAN
The patient is Stable - Low risk of patient condition declining or worsening    Shift Goals  Clinical Goals: stress, echo  Patient Goals: improve WOB  Family Goals: N/A    Progress made toward(s) clinical / shift goals:    Problem: Knowledge Deficit - COPD  Goal: Patient/significant other demonstrates understanding of disease process, utilization of the Action Plan, medications and discharge instruction  Outcome: Progressing     Problem: Knowledge Deficit - Standard  Goal: Patient and family/care givers will demonstrate understanding of plan of care, disease process/condition, diagnostic tests and medications  Outcome: Progressing       Patient is not progressing towards the following goals:

## 2022-02-28 VITALS
OXYGEN SATURATION: 95 % | RESPIRATION RATE: 16 BRPM | HEART RATE: 75 BPM | SYSTOLIC BLOOD PRESSURE: 106 MMHG | HEIGHT: 61 IN | DIASTOLIC BLOOD PRESSURE: 57 MMHG | TEMPERATURE: 97.6 F | BODY MASS INDEX: 55.32 KG/M2 | WEIGHT: 292.99 LBS

## 2022-02-28 LAB
ALBUMIN SERPL BCP-MCNC: 3.8 G/DL (ref 3.2–4.9)
ALBUMIN/GLOB SERPL: 1.5 G/DL
ALP SERPL-CCNC: 109 U/L (ref 30–99)
ALT SERPL-CCNC: 7 U/L (ref 2–50)
ANION GAP SERPL CALC-SCNC: 5 MMOL/L (ref 7–16)
ANISOCYTOSIS BLD QL SMEAR: ABNORMAL
AST SERPL-CCNC: 7 U/L (ref 12–45)
BASOPHILS # BLD AUTO: 0.1 % (ref 0–1.8)
BASOPHILS # BLD: 0.01 K/UL (ref 0–0.12)
BILIRUB SERPL-MCNC: 0.2 MG/DL (ref 0.1–1.5)
BUN SERPL-MCNC: 17 MG/DL (ref 8–22)
CALCIUM SERPL-MCNC: 9.3 MG/DL (ref 8.5–10.5)
CHLORIDE SERPL-SCNC: 104 MMOL/L (ref 96–112)
CO2 SERPL-SCNC: 32 MMOL/L (ref 20–33)
COMMENT 1642: NORMAL
CREAT SERPL-MCNC: 0.89 MG/DL (ref 0.5–1.4)
EOSINOPHIL # BLD AUTO: 0 K/UL (ref 0–0.51)
EOSINOPHIL NFR BLD: 0 % (ref 0–6.9)
ERYTHROCYTE [DISTWIDTH] IN BLOOD BY AUTOMATED COUNT: 56.8 FL (ref 35.9–50)
GLOBULIN SER CALC-MCNC: 2.5 G/DL (ref 1.9–3.5)
GLUCOSE BLD STRIP.AUTO-MCNC: 144 MG/DL (ref 65–99)
GLUCOSE SERPL-MCNC: 156 MG/DL (ref 65–99)
HCT VFR BLD AUTO: 38.7 % (ref 37–47)
HGB BLD-MCNC: 11.2 G/DL (ref 12–16)
HYPOCHROMIA BLD QL SMEAR: ABNORMAL
IMM GRANULOCYTES # BLD AUTO: 0.05 K/UL (ref 0–0.11)
IMM GRANULOCYTES NFR BLD AUTO: 0.6 % (ref 0–0.9)
INR PPP: 3.53 (ref 0.87–1.13)
LYMPHOCYTES # BLD AUTO: 0.59 K/UL (ref 1–4.8)
LYMPHOCYTES NFR BLD: 6.8 % (ref 22–41)
MACROCYTES BLD QL SMEAR: ABNORMAL
MCH RBC QN AUTO: 24.8 PG (ref 27–33)
MCHC RBC AUTO-ENTMCNC: 28.9 G/DL (ref 33.6–35)
MCV RBC AUTO: 85.8 FL (ref 81.4–97.8)
MICROCYTES BLD QL SMEAR: ABNORMAL
MONOCYTES # BLD AUTO: 0.3 K/UL (ref 0–0.85)
MONOCYTES NFR BLD AUTO: 3.5 % (ref 0–13.4)
MORPHOLOGY BLD-IMP: NORMAL
NEUTROPHILS # BLD AUTO: 7.68 K/UL (ref 2–7.15)
NEUTROPHILS NFR BLD: 89 % (ref 44–72)
NRBC # BLD AUTO: 0 K/UL
NRBC BLD-RTO: 0 /100 WBC
PLATELET # BLD AUTO: 221 K/UL (ref 164–446)
PLATELET BLD QL SMEAR: NORMAL
PMV BLD AUTO: 10.9 FL (ref 9–12.9)
POTASSIUM SERPL-SCNC: 5.2 MMOL/L (ref 3.6–5.5)
PROT SERPL-MCNC: 6.3 G/DL (ref 6–8.2)
PROTHROMBIN TIME: 34.3 SEC (ref 12–14.6)
RBC # BLD AUTO: 4.51 M/UL (ref 4.2–5.4)
RBC BLD AUTO: PRESENT
SODIUM SERPL-SCNC: 141 MMOL/L (ref 135–145)
WBC # BLD AUTO: 8.6 K/UL (ref 4.8–10.8)

## 2022-02-28 PROCEDURE — 94660 CPAP INITIATION&MGMT: CPT

## 2022-02-28 PROCEDURE — G0378 HOSPITAL OBSERVATION PER HR: HCPCS

## 2022-02-28 PROCEDURE — 700102 HCHG RX REV CODE 250 W/ 637 OVERRIDE(OP): Performed by: STUDENT IN AN ORGANIZED HEALTH CARE EDUCATION/TRAINING PROGRAM

## 2022-02-28 PROCEDURE — 99217 PR OBSERVATION CARE DISCHARGE: CPT | Performed by: STUDENT IN AN ORGANIZED HEALTH CARE EDUCATION/TRAINING PROGRAM

## 2022-02-28 PROCEDURE — 82962 GLUCOSE BLOOD TEST: CPT

## 2022-02-28 PROCEDURE — 85610 PROTHROMBIN TIME: CPT

## 2022-02-28 PROCEDURE — 85025 COMPLETE CBC W/AUTO DIFF WBC: CPT

## 2022-02-28 PROCEDURE — 80053 COMPREHEN METABOLIC PANEL: CPT

## 2022-02-28 PROCEDURE — 94664 DEMO&/EVAL PT USE INHALER: CPT

## 2022-02-28 PROCEDURE — A9270 NON-COVERED ITEM OR SERVICE: HCPCS | Performed by: STUDENT IN AN ORGANIZED HEALTH CARE EDUCATION/TRAINING PROGRAM

## 2022-02-28 RX ORDER — ALBUTEROL SULFATE 2.5 MG/3ML
2.5 SOLUTION RESPIRATORY (INHALATION) EVERY 4 HOURS PRN
Qty: 30 EACH | Refills: 0 | Status: SHIPPED | OUTPATIENT
Start: 2022-02-28 | End: 2022-03-30

## 2022-02-28 RX ORDER — FLUTICASONE PROPIONATE 50 MCG
SPRAY, SUSPENSION (ML) NASAL
Qty: 16 G | Refills: 3 | Status: SHIPPED | OUTPATIENT
Start: 2022-02-28 | End: 2022-08-26

## 2022-02-28 RX ADMIN — BUDESONIDE AND FORMOTEROL FUMARATE DIHYDRATE 2 PUFF: 160; 4.5 AEROSOL RESPIRATORY (INHALATION) at 05:49

## 2022-02-28 RX ADMIN — ASPIRIN 325 MG: 325 TABLET ORAL at 05:50

## 2022-02-28 RX ADMIN — AZITHROMYCIN MONOHYDRATE 500 MG: 250 TABLET ORAL at 05:50

## 2022-02-28 RX ADMIN — ARIPIPRAZOLE 30 MG: 15 TABLET ORAL at 05:50

## 2022-02-28 RX ADMIN — FAMOTIDINE 40 MG: 20 TABLET ORAL at 05:50

## 2022-02-28 RX ADMIN — FLUTICASONE PROPIONATE 2 SPRAY: 50 SPRAY, METERED NASAL at 05:49

## 2022-02-28 ASSESSMENT — PAIN DESCRIPTION - PAIN TYPE
TYPE: ACUTE PAIN
TYPE: CHRONIC PAIN

## 2022-02-28 NOTE — RESPIRATORY CARE
COPD EDUCATION by COPD CLINICAL EDUCATOR  2/28/2022  at  10:08 AM by Maylin Sullivan, RRT     Patient interviewed by COPD education team.  Patient unable to participate in full program.  She has Asthma COPD KATHIA overlapping pulmonary issues.  A comprehensive packet including information about COPD, types of treatments to manage their disease and safe home Oxygen usage was provided and reviewed with patient at the bedside.  She does not smoke. She had AVAPS sleep machine and her Pulmonary team is assisting in replacing. Requested nebulizer medication for discharge.    COPD Screen  COPD Risk Screening  Do you have a history of COPD?: Yes    COPD Assessment  COPD Clinical Specialists ONLY  COPD Education Initiated: Yes--Short Intervention (unable to do RPM does not have a data phone. Able to change appt as noted She will call [facility] for PCP appt)  DME Company: [employer] per Pt  DME Equipment Type: Oxygen 3lpm nebulizer  Physician Name: LENNY pt to schedule follow up  Pulmonary Follow Up Appointment: 03/16/22  Appt Time: 1400  Pulmonologist Name: BEBE Montague (pt refused for me to move up more  Referrals Initiated: Yes  Pulmonary Rehab: Yes (per admitting)  Smoking Cessation: No  Palliative Care:  (declined discharging)  Hospice: N/A  Home Health Care: Yes (Face to face placed)  [facility] Outreach: N/A  [facility]: N/A  Dispatch Health: Declined (just wants to go gave flyer)  Is this a COPD exacerbation patient?: Yes  $ Demo/Eval of SVN's, MDI's and Aerosols: Yes  (OP) Pulmonary Function Testing: Yes (9/2021: [id number] Ratio 86 see's pulmonary)    Meds to Beds  Would the patient like to opt in for Bedside Medication Delivery at Discharge?: Yes, interested     MY COPD ACTION PLAN     It is recommended that patients and physicians /healthcare providers complete this action plan together. This plan should be discussed at each physician visit and updated as needed.    The green, yellow and red zones  "show groups of symptoms of COPD. This list of symptoms is not comprehensive, and you may experience other symptoms. In the \"Actions\" column, your healthcare provider has recommended actions for you to take based on your symptoms.    Patient Name: Lorene Haro   YOB: 1975   Last Updated on: 7/23/2021  9:53 AM   Green Zone:  I am doing well today Actions   •  Usual activitiy and exercise level •  Take daily medications   •  Usual amounts of cough and phlegm/mucus •  Use oxygen as prescribed   •  Sleep well at night •  Continue regular exercise/diet plan   •  Appetite is good •  At all times avoid cigarette smoke, inhaled irritants     Daily Medications (these medications are taken every day):   Tiotropium Bromide Monohydrate (Spiriva)  Budesonide-Formoterol Fumarate (Symbicort) 2 Puffs  2 Puffs Once daily  Twice daily     Additional Information:  Use the spacer when taking the Symbicort, and rinse your mouth after taking    Yellow Zone:  I am having a bad day or a COPD flare Actions   •  More breathless than usual •  Continue daily medications   •  I have less energy for my daily activities •  Use quick relief inhaler as ordered   •  Increased or thicker phlegm/mucus •  Use oxygen as prescribed   •  Using quick relief inhaler/nebulizer more often •  Get plenty of rest   •  Swelling of ankles more than usual •  Use pursed lip breathing   •  More coughing than usual •  At all times avoid cigarette smoke, inhaled irritants   •  I feel like I have a \"chest cold\"   •  Poor sleep and my symptoms woke me up   •  My appetite is not good   •  My medicine is not helping    •  Call provider immediately if symptoms don’t improve     Continue daily medications, add rescue medications:   Albuterol/Ipratropium (Combivent, Duoneb)  Albuterol 3mL via nebulizer  2 Puffs Every 4 hours PRN  Every 4 hours PRN       Medications to be used during a flare up, (as Discussed with Provider):           Additional Information: "  Use albuterol inhaler with spacer and rinse nebulizer per manufacturers recommendation    Red Zone:  I need urgent medical care Actions   •  Severe shortness of breath even at rest •  Call 911 or seek medical care immediately   •  Not able to do any activity because of breathing    •  Fever or shaking chills    •  Feeling confused or very drowsy     •  Chest pains    •  Coughing up blood

## 2022-02-28 NOTE — DISCHARGE PLANNING
Received call Ginette GREENE in D/C [facility] and patient requested HHC and Dr. Taylor ordered HHC. Spoke with patient via phone and verified all information and received choice form. Choice form filled out with verbal consent. Choice form faxed to Jeffrey JAQUEZ and message sent to Jeffrey JAQUEZ to update.

## 2022-02-28 NOTE — CARE PLAN
The patient is Watcher - Medium risk of patient condition declining or worsening    Shift Goals  Clinical Goals: monitor O2, d/c planning  Patient Goals: safety, comfort  Family Goals: n/a    Progress made toward(s) clinical / shift goals:    Problem: Knowledge Deficit - Standard  Goal: Patient and family/care givers will demonstrate understanding of plan of care, disease process/condition, diagnostic tests and medications  Outcome: Progressing     Problem: Knowledge Deficit - COPD  Goal: Patient/significant other demonstrates understanding of disease process, utilization of the Action Plan, medications and discharge instruction  Outcome: Progressing     Problem: Risk for Infection - COPD  Goal: Patient will remain free from signs and symptoms of infection  Outcome: Progressing     Problem: Nutrition - Advanced  Goal: Patient will display progressive weight gain toward goal have adequate food and fluid intake  Outcome: Progressing     Problem: Ineffective Airway Clearance  Goal: Patient will maintain patent airway with clear/clearing breath sounds  Outcome: Progressing     Problem: Impaired Gas Exchange  Goal: Patient will demonstrate improved ventilation and adequate oxygenation and participate in treatment regimen within the level of ability/situation.  Outcome: Progressing     Problem: Risk for Aspiration  Goal: Patient's risk for aspiration will be absent or decrease  Outcome: Progressing     Problem: Self Care  Goal: Patient will have the ability to perform ADLs independently or with assistance (bathe, groom, dress, toilet and feed)  Outcome: Progressing     Problem: Skin Integrity  Goal: Skin integrity is maintained or improved  Outcome: Progressing     Problem: Pain - Standard  Goal: Alleviation of pain or a reduction in pain to the patient’s comfort goal  Outcome: Progressing       Patient is not progressing towards the following goals:

## 2022-02-28 NOTE — PROGRESS NOTES
Patient discharged home. Patient AOX 4.  IV removed, discharge instructions provided to patient and reviewed with them. All questions answered, patient provided copy of discharge instructions and instructed on when to F/U with MD. Patient wheeled off unit. Patient discharged into the care of her mother.

## 2022-02-28 NOTE — TELEPHONE ENCOUNTER
Have we ever prescribed this med? Yes.  If yes, what date? 12/16/21    Last OV: 12/16/21 with Jessica Whitfield PA-C    Next OV: 03/16/22 with Jessica Whitfield PA-C    DX:     Medications:   Requested Prescriptions     Pending Prescriptions Disp Refills   • fluticasone (FLONASE) 50 MCG/ACT nasal spray [Pharmacy Med Name: FLUTICASONE 50MCG NASAL SP (120) RX] 16 g 3     Sig: ADMOINISTER 1 TO 2 SPRAYS INTO AFFECTED NOSTRIL(S) TWICE DAILY

## 2022-02-28 NOTE — PROGRESS NOTES
Patient down to [place] marie, Dr. Taylor notified of patient's request for [facility]. Orders received.

## 2022-02-28 NOTE — PROGRESS NOTES
Hospital Medicine Daily Progress Note    Date of Service  2/27/2022    Chief Complaint  Lorene Haro is a 46 y.o. female presented 2/26/2022 with chest pain, weakness and left flank pain    Hospital Course  Lorene Haro is a 46 y.o. obese F former smoker with history of severe persistent asthma, obesity hypoventilation syndrome, KATHIA, chronic respiratory failure on 2 L nasal cannula, atrial fibrillation on warfarin, schizophrenia on Abilify, hyperlipidemia on statin, venous stasis dermatitis with chronic bilateral lower extremity edema, who presented 2/26/2022 with chest pain, nausea, chest tightness and dyspnea, increasing cough.  Patient notes for the past 3 to 4 days she has been having increasing cough with mildly increased sputum production, chest tightness and wheezing.  She has been using her maintenance asthma/COPD medications as well as her rescue albuterol inhaler with minimal improvement.  On the day of admission, she began experiencing chest pain starting around 4:30 PM, pain was moderate in severity located in the center of her chest substernal radiating to the back aggravated by coughing and exertion, associated with nausea, no associated dizziness or vomiting.  No alleviating factors.  Due to the ongoing nature of the chest pain and dyspnea, she presented for evaluation.  She denies any headache or vision changes, vomiting, abdominal pain, dysuria or diarrhea.     In the ED, she was afebrile, normal pulse, tachypneic, normal blood pressure. She was requiring 4 L nasal cannula to maintain oxygen saturations greater than 90%.  Initial work-up shows mild leukocytosis, CMP without significant electrolyte abnormalities, normal renal function, normal liver function, troponin T 25, proBNP 195, UA unremarkable, INR 2.57.  Patient was treated with breathing treatments and subsequently referred to hospitalist for admission.    Interval Problem Update  2/27/2022  Vitals reviewed; afebrile.  The rest of  the vitals within normal parameters on 2-2.5L baseline O2  Pain scale reported - none   Blood glucose in last 24hrs - mid 100s   I/O - good UO, tolerating IP diet    When seen bedside, awake and alert. Denies acute complain. Stress test result discussed. ACS is unlikely at this point. Regarding CPAP, she states her device was recalled and she does not have CPAP since 2019. Importance of needing a CPAP for her explained and the need to follow up. Pt states her caregiver organizes her meds and does her groceries.     Spoke with Pt's mother and sister over the phone - discussed importance of CPAP. Sister will check on Pt and make sure Pt follow up.    Plan was to DC but d/t concern of somnolence, plan is to monitor overnight.    Pulm consult appreciated     Labs reviewed  ABG pH 7.31, pCO2 57.1, pO2 77  Procal wnl  UTox neg    NST: No evidence of significant jeopardized viable myocardium or prior myocardial infarction. Normal left ventricular size, ejection fraction, and wall motion    Echo: no regional wall motion abnormalities, LEVF 70%, RVSP 27    I have personally seen and examined the patient at bedside. I discussed the plan of care with patient, family, bedside RN and pulmonary.    Consultants/Specialty  pulmonary    Code Status  Full Code    Disposition  Patient is not medically cleared for discharge.   Anticipate discharge to to home with close outpatient follow-up.  I have placed the appropriate orders for post-discharge needs.    Review of Systems  Review of Systems   Constitutional: Positive for malaise/fatigue. Negative for chills and fever.   HENT: Negative for congestion and sore throat.    Eyes: Negative for blurred vision and double vision.   Respiratory: Positive for cough and shortness of breath. Negative for sputum production.    Cardiovascular: Positive for chest pain. Negative for palpitations and leg swelling.   Gastrointestinal: Negative for abdominal pain, heartburn, nausea and vomiting.    Genitourinary: Negative for dysuria, frequency and urgency.   Musculoskeletal: Negative for myalgias and neck pain.   Neurological: Negative for dizziness, focal weakness and headaches.   Psychiatric/Behavioral: Negative for depression.        Physical Exam  Temp:  [35.9 °C (96.7 °F)-36.2 °C (97.2 °F)] 35.9 °C (96.7 °F)  Pulse:  [61-89] 82  Resp:  [17-29] 18  BP: (107-140)/(51-74) 140/70  SpO2:  [92 %-100 %] 92 %    Physical Exam  Vitals and nursing note reviewed.   Constitutional:       General: She is not in acute distress.     Appearance: She is obese. She is ill-appearing.   HENT:      Head: Normocephalic and atraumatic.      Mouth/Throat:      Mouth: Mucous membranes are moist.      Pharynx: Oropharynx is clear.   Eyes:      General: No scleral icterus.     Conjunctiva/sclera: Conjunctivae normal.   Cardiovascular:      Rate and Rhythm: Normal rate and regular rhythm.      Pulses: Normal pulses.      Heart sounds: Normal heart sounds.   Pulmonary:      Effort: Pulmonary effort is normal. No respiratory distress.      Breath sounds: No wheezing.      Comments: Reduced breath sound  Abdominal:      General: Bowel sounds are normal. There is no distension.      Palpations: Abdomen is soft.      Tenderness: There is no abdominal tenderness.   Musculoskeletal:         General: No swelling or tenderness. Normal range of motion.   Skin:     General: Skin is warm and dry.      Capillary Refill: Capillary refill takes less than 2 seconds.   Neurological:      General: No focal deficit present.      Mental Status: She is alert and oriented to person, place, and time. Mental status is at baseline.      Comments: Slight somnolence   Psychiatric:         Mood and Affect: Mood normal.         Fluids    Intake/Output Summary (Last 24 hours) at 2/27/2022 1755  Last data filed at 2/27/2022 0300  Gross per 24 hour   Intake --   Output 600 ml   Net -600 ml       Laboratory  Recent Labs     02/26/22  1857   WBC 11.4*   RBC 5.09    HEMOGLOBIN 12.9   HEMATOCRIT 43.0   MCV 84.5   MCH 25.3*   MCHC 30.0*   RDW 58.0*   PLATELETCT 257   MPV 10.9     Recent Labs     02/26/22  1857   SODIUM 142   POTASSIUM 4.3   CHLORIDE 102   CO2 30   GLUCOSE 155*   BUN 10   CREATININE 0.82   CALCIUM 9.6     Recent Labs     02/27/22  0200   INR 2.57*               Imaging  EC-ECHOCARDIOGRAM COMPLETE W/ CONT   Final Result      NM-CARDIAC STRESS TEST   Final Result      DX-CHEST-PORTABLE (1 VIEW)   Final Result      1.  Stable cardiomegaly.   2.  No acute abnormality.           Assessment/Plan  * Chest pain, rule out acute myocardial infarction- (present on admission)  Assessment & Plan  EKG no ischemic changes  Risk Factors (ageM>45 F>55, smoking, HTN, HLD, Obesity, DM, Metabolic syndrome, FH, sedentary, stress, drug use, h/o preeclampsia, autoimmune)    NST: No evidence of significant jeopardized viable myocardium or prior myocardial infarction. Normal left ventricular size, ejection fraction, and wall motion  Echo: no regional wall motion abnormalities, LEVF 70%, RVSP 27    Supplemental O2 to keep SPO2 greater than 90%    ACS is unlikely at this point      KATHIA (obstructive sleep apnea)- (present on admission)  Assessment & Plan  CPAP while admitted     she states her device was recalled and she does not have CPAP since 2019. Importance of needing a CPAP for her explained and the need to follow up. Pt states her caregiver organizes her meds and does her groceries.     Spoke with Pt's mother and sister over the phone - discussed importance of CPAP. Sister will check on Pt and make sure Pt follow up.        Chronic hypoxic respiratory failure (HCC)- (present on admission)  Assessment & Plan  With mild acute exacerbation.  On 2 L home O2 requiring 3 to 4 L in the ED despite multiple breathing treatments at home, increased cough from baseline.  Maintain sat > 88% for v/q matching  Steroids: P.o. prednisone  Nebs/rx: SchePduled and as needed DuoNebs, continue home  Symbicort, Singulair, Spiriva  Azithromycin for 3 days  Procalcitonin wnl          HLD (hyperlipidemia)- (present on admission)  Assessment & Plan  Continue statin    Atrial fibrillation (HCC)- (present on admission)  Assessment & Plan  Continue warfarin INR goal 2-3  Admitting EKG sinus rhythm normal heart rate        Schizophrenia (HCC)- (present on admission)  Assessment & Plan  Continue Abilify    Class 3 severe obesity in adult (HCC)- (present on admission)  Assessment & Plan  Weight loss and lifestyle modification counseling prior to discharge    Bipolar Disorder- (present on admission)  Assessment & Plan  Continue Abilify       VTE prophylaxis: therapeutic anticoagulation with coumadin    I have performed a physical exam and reviewed and updated ROS and Plan today (2/27/2022).

## 2022-02-28 NOTE — PROGRESS NOTES
"When dinner arrived, tech delivered meal to pt bedside table. Pt began yelling about how she doesn't eat chicken with stuff on it and \"to get that shit away from her\". When the tech did not remove it immediately, pt attempted to push the pt meal try off the bedside table and demanded that \"we get her something to fucking eat.\" Pt was advised that we do not have dietary consult on our floor and all patients are served the standard menu in accordance with their diet order. Pt began to yell at tech saying \"that's bullshit every time im here they ask.\" Pt was advised to not yell at staff and that we would be most accommodating but she needed to calm down and speak to the staff with respect. She told the tech to get out. Tech advised pt that when she was calm we could discuss her meal options.     Pt pressed call light and demanded that staff bring her a menu and call the kitchen for her. Pt was advised that the tech would bring down and menu and give her instructions on how to contact the kitchen. When Tech arrived bedside the pt began raising her voice at the tech, the tech reminded the patient that we needed to speak in a respectful manner with the staff. Tech provided menu for the kitchen and gave directions and phone number on how to place an order.   "

## 2022-02-28 NOTE — ASSESSMENT & PLAN NOTE
CPAP while admitted     she states her device was recalled and she does not have CPAP since 2019. Importance of needing a CPAP for her explained and the need to follow up. Pt states her caregiver organizes her meds and does her groceries.     Spoke with Pt's mother and sister over the phone - discussed importance of CPAP. Sister will check on Pt and make sure Pt follow up.

## 2022-02-28 NOTE — PROGRESS NOTES
CPAP set up and place on patient by RT. Patient is sleeping at this time, respirations are even and unlabored.

## 2022-02-28 NOTE — FACE TO FACE
Face to Face Supporting Documentation - Home Health    The encounter with this patient was in whole or in part the primary reason for home health admission.    Date of encounter:   Patient:                    MRN:                       YOB: 2024  Lorene Haro  [id number]  1975     Home health to see patient for:  Skilled Nursing care for assessment, interventions & education, Home health aide, Physical Therapy evaluation and treatment and Occupational therapy evaluation and treatment    Skilled need for:  Exacerbation of Chronic Disease State Acute COPD exacerbation; chest pain observation    Skilled nursing interventions to include:  Comment: Continued home PT/OT    Homebound status evidenced by:  Need the aid of supportive devices such as crutches, canes, wheelchairs or walkers or Needs the assistance of another person in order to leave the home. Leaving home requires a considerable and taxing effort. There is a normal inability to leave the home.    Community Physician to provide follow up care: Mallika Sharp M.D.     Optional Interventions? No      I certify the face to face encounter for this home health care referral meets the CMS requirements and the encounter/clinical assessment with the patient was, in whole, or in part, for the medical condition(s) listed above, which is the primary reason for home health care. Based on my clinical findings: the service(s) are medically necessary, support the need for home health care, and the homebound criteria are met.  I certify that this patient has had a face to face encounter by myself.  Maldonado Taylor M.D. - NPI: [id number]

## 2022-02-28 NOTE — PROGRESS NOTES
Assessment completed. Pt A&Ox4. Pt reports 8/10 right shoulder pain this time. Monitors applied, VS stable, call light and belongings within reach. POC updated (discharge). Pt educated on room and call light, pt verbalized understanding. Communication board updated. Needs met.

## 2022-02-28 NOTE — CARE PLAN
The patient is Stable - Low risk of patient condition declining or worsening    Shift Goals  Clinical Goals: monitor oxygen  Patient Goals: breath better  Family Goals: n/a    Progress made toward(s) clinical / shift goals:      Problem: Knowledge Deficit - Standard  Goal: Patient and family/care givers will demonstrate understanding of plan of care, disease process/condition, diagnostic tests and medications  Outcome: Progressing     Problem: Knowledge Deficit - COPD  Goal: Patient/significant other demonstrates understanding of disease process, utilization of the Action Plan, medications and discharge instruction  Outcome: Progressing     Problem: Pain - Standard  Goal: Alleviation of pain or a reduction in pain to the patient’s comfort goal  Outcome: Progressing       Patient is not progressing towards the following goals:

## 2022-02-28 NOTE — PROGRESS NOTES
Spoke with RN, patient uses oxygen at home and needs tank prior to discharge home. RN to notify DC lounge RN once  has arranged O2 delivery.

## 2022-02-28 NOTE — PROGRESS NOTES
I have received report from day shift RN. I have assumed care of this patient. She is A&Ox4, behavior is appropriate, no s/s of distress at this time. Patient has been assessed (see flow sheet) and plan of care has been discussed. Patient verbalizes understanding of plan and denies any further needs at this time. Patient is now resting in bed, bed is in the lowest position and locked, and the call light is within reach.

## 2022-02-28 NOTE — DISCHARGE PLANNING
Received Choice form at 1113  Agency/Facility Name: [facility]  Referral sent per Choice form @ 1120     1158-  Agency/Facility Name: [facility]  Outcome: Pt declined due to insurance capacity.    1200-  DPA sent referral to Pt next choice [facility].

## 2022-02-28 NOTE — CARE PLAN
Problem: Bronchoconstriction  Goal: Improve in air movement and diminished wheezing  Description: Target End Date:  2 to 3 days    1.  Implement inhaled treatments  2.  Evaluate and manage medication effects  Outcome: Progressing  Flowsheets  Taken 2/27/2022 1745 by Lisset Rosario, RRT  Bronchodilator Indications: History / Diagnosis  Taken 2/26/2022 6628 by Nubia Bridges, RRT  Bronchodilator Goals/Outcome: Patient at Stable Baseline  Note:     Respiratory Update    Treatment modality: Duo, Symbicort, Spiriva  Frequency:Q4, BID, Qday    Pt tolerating current treatments well with no adverse reactions.       Problem: Bronchopulmonary Hygiene  Goal: Increase mobilization of retained secretions  Description: Target End Date:  2 to 3 days    1.  Perform bronchopulmonary therapy as indicated by assessment  2.  Perform airway suctioning  3.  Perform actions to maintain patient airway  Outcome: Progressing  Flowsheets (Taken 2/27/2022 1745)  Bronchopulmonary Hygiene Indications: Difficulty with Secretion Clearance w/ Sputum Production greater than 25 ml / day (3tbsp) / day  Bronchopulmonary Hygiene Outcomes: Optimal Hydration with moderate or less sputum production  Note: Flutter QID

## 2022-02-28 NOTE — DISCHARGE INSTRUCTIONS
Discharge Instructions    Discharged to home by medical transportation with escort. Discharged via wheelchair, hospital escort: Yes.  Special equipment needed: Not Applicable    Be sure to schedule a follow-up appointment with your primary care doctor or any specialists as instructed.     Discharge Plan:   Diet Plan: Discussed  Activity Level: Discussed  Confirmed Follow up Appointment: Patient to Call and Schedule Appointment  Confirmed Symptoms Management: Discussed  Medication Reconciliation Updated: Yes  Influenza Vaccine Indication: Patient Refuses    I understand that a diet low in cholesterol, fat, and sodium is recommended for good health. Unless I have been given specific instructions below for another diet, I accept this instruction as my diet prescription.   Other diet: regular diet    Special Instructions: Chronic Obstructive Pulmonary Disease (COPD) is a long-term, progressive lung disease that makes it harder to breathe. It includes chronic bronchitis, emphysema, and refractory (non-reversible) asthma. With COPD, the airways in your lungs become inflamed and thicken, and the tissue where oxygen is exchanged is destroyed. The flow of air in and out of your lungs decreases. When that happens, less oxygen gets into your body tissues, and it becomes harder to get rid of the waste gas carbon dioxide. As the disease gets worse, shortness of breath makes it harder to remain active. There is no cure for COPD, but it is preventable and treatable.    COPD Patient Discharge Instructions    • Diet  o Follow a low fat, low cholesterol, low salt diet unless instructed otherwise by your Doctor. Read the labels on the back of food products and track your intake of fat, cholesterol and salt.  • No smoking  o Discontinuing smoking will have the biggest impact on preventing progression of disease.  o To participate in "[employer]’s Quit Tobacco Program, call 932-2569 or visit [email or web address]/QuitTobacco  • Oxygen  o If your  doctor has order that you wear oxygen at home, it is important to wear it as ordered.  o Do not smoke, vape, or use e-cigarettes with oxygen on.  o Store in an appropriate location: upright in its art or laid flat down, away from open flames and stoves.   o Do not use oil-based creams and moisturizers (ie: petroleum products, oil-based lip moisturizers) or aerosol sprays (ie: hair sprays or deodorants) when using your oxygen equipment.  o Be careful with tubing placement to prevent yourself and others from tripping.  • Medications  o Refer to your personalized Action Plan to manage your symptoms.  • Warning signs of an exacerbation  o Breathing fast and shallow, worsening shortness of breath (like you just finished exercising)  o Chest tightness  o Increases in sputum production  o Changes in sputum color  o Lower oxygen levels than baseline  • When to call your doctor  o If the warning signs of an exacerbation do not improve  o Refer to your personalized Action Plan   • Pulmonary Rehab  o Your doctor has ordered you a referral to Pulmonary Rehab. Call [phone] to schedule an appointment  • Attend your follow-up appointment with your PCP and/or Pulmonologist  • Remote Monitoring: At the direction of the remote monitoring on-call provider, you may increase your oxygen by 2 liters above your baseline.     See the educational handout provided by your COPD Navigator for more information. This also explains more about COPD, symptoms of an exacerbation, and some of the tests that you have undergone.    · Is patient discharged on Warfarin / Coumadin?   No     Depression / Suicide Risk    As you are discharged from this [facility] facility, it is important to learn how to keep safe from harming yourself.    Recognize the warning signs:  · Abrupt changes in personality, positive or negative- including increase in energy   · Giving away possessions  · Change in eating patterns- significant weight changes-  positive or  negative  · Change in sleeping patterns- unable to sleep or sleeping all the time   · Unwillingness or inability to communicate  · Depression  · Unusual sadness, discouragement and loneliness  · Talk of wanting to die  · Neglect of personal appearance   · Rebelliousness- reckless behavior  · Withdrawal from people/activities they love  · Confusion- inability to concentrate     If you or a loved one observes any of these behaviors or has concerns about self-harm, here's what you can do:  · Talk about it- your feelings and reasons for harming yourself  · Remove any means that you might use to hurt yourself (examples: pills, rope, extension cords, firearm)  · Get professional help from the community (Mental Health, Substance Abuse, psychological counseling)  · Do not be alone:Call your Safe Contact- someone whom you trust who will be there for you.  · Call your local CRISIS HOTLINE 382-1178 or 365-636-7291  · Call your local Children's Mobile Crisis Response Team [place] (415) 042-1694 or www.[employer]  · Call the toll free National Suicide Prevention Hotlines   · National Suicide Prevention Lifeline 831-840-TNAC (4861)  · National Hope Line Network 800-SUICIDE (446-1302)    Azithromycin tablets  What is this medicine?  AZITHROMYCIN (az ith corine MYE sin) is a macrolide antibiotic. It is used to treat or prevent certain kinds of bacterial infections. It will not work for colds, flu, or other viral infections.  This medicine may be used for other purposes; ask your health care provider or pharmacist if you have questions.  COMMON BRAND NAME(S): Zithromax, Zithromax Tri-Romario, Zithromax Z-Romario  What should I tell my health care provider before I take this medicine?  They need to know if you have any of these conditions:  · history of blood diseases, like leukemia  · history of irregular heartbeat  · kidney disease  · liver disease  · myasthenia gravis  · an unusual or allergic reaction to azithromycin,  erythromycin, other macrolide antibiotics, foods, dyes, or preservatives  · pregnant or trying to get pregnant  · breast-feeding  How should I use this medicine?  Take this medicine by mouth with a full glass of water. Follow the directions on the prescription label. The tablets can be taken with food or on an empty stomach. If the medicine upsets your stomach, take it with food. Take your medicine at regular intervals. Do not take your medicine more often than directed. Take all of your medicine as directed even if you think your are better. Do not skip doses or stop your medicine early.  Talk to your pediatrician regarding the use of this medicine in children. While this drug may be prescribed for children as young as 6 months for selected conditions, precautions do apply.  Overdosage: If you think you have taken too much of this medicine contact a poison control center or emergency room at once.  NOTE: This medicine is only for you. Do not share this medicine with others.  What if I miss a dose?  If you miss a dose, take it as soon as you can. If it is almost time for your next dose, take only that dose. Do not take double or extra doses.  What may interact with this medicine?  Do not take this medicine with any of the following medications:  · cisapride  · dronedarone  · pimozide  · thioridazine  This medicine may also interact with the following medications:  · antacids that contain aluminum or magnesium  · birth control pills  · colchicine  · cyclosporine  · digoxin  · ergot alkaloids like dihydroergotamine, ergotamine  · nelfinavir  · other medicines that prolong the QT interval (an abnormal heart rhythm)  · phenytoin  · warfarin  This list may not describe all possible interactions. Give your health care provider a list of all the medicines, herbs, non-prescription drugs, or dietary supplements you use. Also tell them if you smoke, drink alcohol, or use illegal drugs. Some items may interact with your  medicine.  What should I watch for while using this medicine?  Tell your doctor or healthcare provider if your symptoms do not start to get better or if they get worse.  This medicine may cause serious skin reactions. They can happen weeks to months after starting the medicine. Contact your healthcare provider right away if you notice fevers or flu-like symptoms with a rash. The rash may be red or purple and then turn into blisters or peeling of the skin. Or, you might notice a red rash with swelling of the face, lips or lymph nodes in your neck or under your arms.  Do not treat diarrhea with over the counter products. Contact your doctor if you have diarrhea that lasts more than 2 days or if it is severe and watery.  This medicine can make you more sensitive to the sun. Keep out of the sun. If you cannot avoid being in the sun, wear protective clothing and use sunscreen. Do not use sun lamps or tanning beds/booths.  What side effects may I notice from receiving this medicine?  Side effects that you should report to your doctor or health care professional as soon as possible:  · allergic reactions like skin rash, itching or hives, swelling of the face, lips, or tongue  · bloody or watery diarrhea  · breathing problems  · chest pain  · fast, irregular heartbeat  · muscle weakness  · rash, fever, and swollen lymph nodes  · redness, blistering, peeling, or loosening of the skin, including inside the mouth  · signs and symptoms of liver injury like dark yellow or brown urine; general ill feeling or flu-like symptoms; light-colored stools; loss of appetite; nausea; right upper belly pain; unusually weak or tired; yellowing of the eyes or skin  · white patches or sores in the mouth  · unusually weak or tired  Side effects that usually do not require medical attention (report to your doctor or health care professional if they continue or are bothersome):  · diarrhea  · nausea  · stomach pain  · vomiting  This list may not  describe all possible side effects. Call your doctor for medical advice about side effects. You may report side effects to FDA at 7-445-STD-0685.  Where should I keep my medicine?  Keep out of the reach of children.  Store at room temperature between 15 and 30 degrees C (59 and 86 degrees F). Throw away any unused medicine after the expiration date.  NOTE: This sheet is a summary. It may not cover all possible information. If you have questions about this medicine, talk to your doctor, pharmacist, or health care provider.  © 2020 [employer] (2020-03-26 17:19:20)    Prednisone tablets  What is this medicine?  PREDNISONE (PRED ni sone) is a corticosteroid. It is commonly used to treat inflammation of the skin, joints, lungs, and other organs. Common conditions treated include asthma, allergies, and arthritis. It is also used for other conditions, such as blood disorders and diseases of the adrenal glands.  This medicine may be used for other purposes; ask your health care provider or pharmacist if you have questions.  COMMON BRAND NAME(S): Deltasone, Predone, Sterapred, Sterapred DS  What should I tell my health care provider before I take this medicine?  They need to know if you have any of these conditions:  · Cushing's syndrome  · diabetes  · glaucoma  · heart disease  · high blood pressure  · infection (especially a virus infection such as chickenpox, cold sores, or herpes)  · kidney disease  · liver disease  · mental illness  · myasthenia gravis  · osteoporosis  · seizures  · stomach or intestine problems  · thyroid disease  · an unusual or allergic reaction to lactose, prednisone, other medicines, foods, dyes, or preservatives  · pregnant or trying to get pregnant  · breast-feeding  How should I use this medicine?  Take this medicine by mouth with a glass of water. Follow the directions on the prescription label. Take this medicine with food. If you are taking this medicine once a day, take it in the  morning. Do not take more medicine than you are told to take. Do not suddenly stop taking your medicine because you may develop a severe reaction. Your doctor will tell you how much medicine to take. If your doctor wants you to stop the medicine, the dose may be slowly lowered over time to avoid any side effects.  Talk to your pediatrician regarding the use of this medicine in children. Special care may be needed.  Overdosage: If you think you have taken too much of this medicine contact a poison control center or emergency room at once.  NOTE: This medicine is only for you. Do not share this medicine with others.  What if I miss a dose?  If you miss a dose, take it as soon as you can. If it is almost time for your next dose, talk to your doctor or health care professional. You may need to miss a dose or take an extra dose. Do not take double or extra doses without advice.  What may interact with this medicine?  Do not take this medicine with any of the following medications:  · metyrapone  · mifepristone  This medicine may also interact with the following medications:  · aminoglutethimide  · amphotericin B  · aspirin and aspirin-like medicines  · barbiturates  · certain medicines for diabetes, like glipizide or glyburide  · cholestyramine  · cholinesterase inhibitors  · cyclosporine  · digoxin  · diuretics  · ephedrine  · female hormones, like estrogens and birth control pills  · isoniazid  · ketoconazole  · NSAIDS, medicines for pain and inflammation, like ibuprofen or naproxen  · phenytoin  · rifampin  · toxoids  · vaccines  · warfarin  This list may not describe all possible interactions. Give your health care provider a list of all the medicines, herbs, non-prescription drugs, or dietary supplements you use. Also tell them if you smoke, drink alcohol, or use illegal drugs. Some items may interact with your medicine.  What should I watch for while using this medicine?  Visit your doctor or health care  professional for regular checks on your progress. If you are taking this medicine over a prolonged period, carry an identification card with your name and address, the type and dose of your medicine, and your doctor's name and address.  This medicine may increase your risk of getting an infection. Tell your doctor or health care professional if you are around anyone with measles or chickenpox, or if you develop sores or blisters that do not heal properly.  If you are going to have surgery, tell your doctor or health care professional that you have taken this medicine within the last twelve months.  Ask your doctor or health care professional about your diet. You may need to lower the amount of salt you eat.  This medicine may increase blood sugar. Ask your healthcare provider if changes in diet or medicines are needed if you have diabetes.  What side effects may I notice from receiving this medicine?  Side effects that you should report to your doctor or health care professional as soon as possible:  · allergic reactions like skin rash, itching or hives, swelling of the face, lips, or tongue  · changes in emotions or moods  · changes in vision  · depressed mood  · eye pain  · fever or chills, cough, sore throat, pain or difficulty passing urine  · signs and symptoms of high blood sugar such as being more thirsty or hungry or having to urinate more than normal. You may also feel very tired or have blurry vision.  · swelling of ankles, feet  Side effects that usually do not require medical attention (report to your doctor or health care professional if they continue or are bothersome):  · confusion, excitement, restlessness  · headache  · nausea, vomiting  · skin problems, acne, thin and shiny skin  · trouble sleeping  · weight gain  This list may not describe all possible side effects. Call your doctor for medical advice about side effects. You may report side effects to FDA at 9-955-FDA-5908.  Where should I keep my  medicine?  Keep out of the reach of children.  Store at room temperature between 15 and 30 degrees C (59 and 86 degrees F). Protect from light. Keep container tightly closed. Throw away any unused medicine after the expiration date.  NOTE: This sheet is a summary. It may not cover all possible information. If you have questions about this medicine, talk to your doctor, pharmacist, or health care provider.  © 2020 [employer] Standard (2019-09-17 10:54:22)

## 2022-03-01 NOTE — DISCHARGE SUMMARY
Discharge Summary    CHIEF COMPLAINT ON ADMISSION  Chief Complaint   Patient presents with   • Chest Pain     Describes pain as sharp, started about 1630. Denies any new SOB   • Weakness     Bilat arms   • Nausea     Started around 1600   • Flank Pain     States her left side hurts after she urinates       Reason for Admission  Chest pain observation     Admission Date  2/26/2022    CODE STATUS  FULL    HPI & HOSPITAL COURSE  Lorene Haro is a 46 y.o. obese F former smoker with history of severe persistent asthma, obesity hypoventilation syndrome, KATHIA, chronic respiratory failure on 2 L nasal cannula, atrial fibrillation on warfarin, schizophrenia on Abilify, hyperlipidemia on statin, venous stasis dermatitis with chronic bilateral lower extremity edema, who presented 2/26/2022 with chest pain, nausea, chest tightness and dyspnea, increasing cough.  Patient notes for the past 3 to 4 days she has been having increasing cough with mildly increased sputum production, chest tightness and wheezing.  She has been using her maintenance asthma/COPD medications as well as her rescue albuterol inhaler with minimal improvement.  On the day of admission, she began experiencing chest pain starting around 4:30 PM, pain was moderate in severity located in the center of her chest substernal radiating to the back aggravated by coughing and exertion, associated with nausea, no associated dizziness or vomiting.  No alleviating factors.  Due to the ongoing nature of the chest pain and dyspnea, she presented for evaluation.  She denies any headache or vision changes, vomiting, abdominal pain, dysuria or diarrhea.     In the ED, she was afebrile, normal pulse, tachypneic, normal blood pressure. She was requiring 4 L nasal cannula to maintain oxygen saturations greater than 90%.  Initial work-up shows mild leukocytosis, CMP without significant electrolyte abnormalities, normal renal function, normal liver function, troponin T 25,  proBNP 195, UA unremarkable, INR 2.57.  Patient was treated with breathing treatments and subsequently referred to hospitalist for admission.    2/27: O2 at baseline. NST - no reversible defects. ACS is unlikely at this point. Regarding CPAP, she states her device was recalled and she does not have CPAP since 2019. Importance of needing a CPAP for her explained and the need to follow up. Pt states her caregiver organizes her meds and does her groceries. Spoke with Pt's mother and sister over the phone - discussed importance of CPAP. Sister will check on Pt and make sure Pt follow up. Plan was to DC but d/t concern of somnolence, plan was monitored overnight.    2/28: stable vitals and afebrile. Exam at bedside grossly unremarkable except minimal wheezes. Pt received CPAP overnight. COPD educator consult appreciated. Pt is to follow up with pulm and sleep clinic outpatient. Home health referral placed. Deemed stable for DC.      Therefore, she is discharged in fair and stable condition to home with organized home healthcare and close outpatient follow-up.    The patient recovered much more quickly than anticipated on admission.    Discharge Date  2/28/2022    FOLLOW UP ITEMS POST DISCHARGE  N/A    DISCHARGE DIAGNOSES  Principal Problem:    Chest pain, rule out acute myocardial infarction POA: Yes  Active Problems:    KATHIA (obstructive sleep apnea) POA: Yes    Bipolar Disorder POA: Yes    Class 3 severe obesity in adult (HCC) POA: Yes    Schizophrenia (HCC) POA: Yes    Atrial fibrillation (HCC) POA: Yes    HLD (hyperlipidemia) POA: Yes    Severe persistent asthma without complication POA: Yes    Chronic hypoxic respiratory failure (HCC) POA: Yes  Resolved Problems:    * No resolved hospital problems. *      FOLLOW UP  Future Appointments   Date Time Provider Department Center   3/2/2022 11:30 AM [facility] EXAM 5 [facility] None   3/15/2022  4:15 PM Angelita Posada P.A.-C. [facility] None   3/16/2022  2:00 PM Jessica Whitfield P.A.-C.  [facility] None     Mallika Sharp M.D.  [facility]   [address]  [place] [address]  [phone]      Please call your primary care provider to schedule a hospital follow up. Thank you.      MEDICATIONS ON DISCHARGE     Medication List      START taking these medications      Instructions   azithromycin 250 MG Tabs  Commonly known as: ZITHROMAX   Take 1 Tablet by mouth every day for 4 days.  Dose: 250 mg     predniSONE 20 MG Tabs  Commonly known as: DELTASONE   Take 2 Tablets by mouth every day for 3 days.  Dose: 40 mg        CHANGE how you take these medications      Instructions   * albuterol 108 (90 Base) MCG/ACT Aers inhalation aerosol  What changed: Another medication with the same name was added. Make sure you understand how and when to take each.   Inhale 2 Puffs every 6 hours as needed for Shortness of Breath.  Dose: 2 Puff     * albuterol 2.5mg/3ml Nebu solution for nebulization  What changed: You were already taking a medication with the same name, and this prescription was added. Make sure you understand how and when to take each.  Commonly known as: PROVENTIL   Doctor's comments: 30 days supply  Take 3 mL by nebulization every four hours as needed for Shortness of Breath for up to 30 days.  Dose: 2.5 mg     warfarin 6 MG Tabs  What changed:   · how much to take  · how to take this  · when to take this  · additional instructions  Commonly known as: COUMADIN   Take one to one and one-half (1-1.5) tablets daily as directed by [facility]         * This list has 2 medication(s) that are the same as other medications prescribed for you. Read the directions carefully, and ask your doctor or other care provider to review them with you.            CONTINUE taking these medications      Instructions   acetaminophen 325 MG Tabs  Commonly known as: Tylenol   Take 325 mg by mouth every 8 hours as needed (Generalized pain). Prescription is for 4 tabs every 8 hours prn, patient states she  takes 2 tabls every 4 hours prn  Dose: 325 mg     aripiprazole 30 MG tablet  Commonly known as: ABILIFY   Take 30 mg by mouth every morning.  Dose: 30 mg     atorvastatin 20 MG Tabs  Commonly known as: LIPITOR   Take 20 mg by mouth at bedtime.  Dose: 20 mg     budesonide-formoterol 160-4.5 MCG/ACT Aero  Commonly known as: SYMBICORT   Inhale 2 Puffs 2 times a day.  Dose: 2 Puff     famotidine 40 MG Tabs  Commonly known as: PEPCID   Take 40 mg by mouth every day. Indications: Heartburn  Dose: 40 mg     montelukast 10 MG Tabs  Commonly known as: SINGULAIR   TAKE 1 TABLET BY MOUTH EVERY NIGHT AT BEDTIME     nitroglycerin 0.4 MG Subl  Commonly known as: NITROSTAT   Place 1 tablet under the tongue as needed for Chest Pain.  Dose: 0.4 mg     Spiriva HandiHaler 18 MCG Caps  Generic drug: tiotropium   Place 1 Capsule into inhaler and inhale every day.  Dose: 18 mcg     traZODone 50 MG Tabs  Commonly known as: DESYREL   Take 100 mg by mouth at bedtime.  Dose: 100 mg            Allergies  Allergies   Allergen Reactions   • Penicillin G Rash and Itching     pt reports that she gets a rash all over her body and gets itchy   • Amoxicillin Rash and Itching     Tolerates cephalosporins, pt reports that she gets a rash all over her body and gets itchy       DIET  Cardiac    ACTIVITY  As tolerated.  Weight bearing as tolerated    CONSULTATIONS  Cardiology    PROCEDURES  N/A    LABORATORY  Lab Results   Component Value Date    SODIUM 141 02/28/2022    POTASSIUM 5.2 02/28/2022    CHLORIDE 104 02/28/2022    CO2 32 02/28/2022    GLUCOSE 156 (H) 02/28/2022    BUN 17 02/28/2022    CREATININE 0.89 02/28/2022    CREATININE 0.9 09/11/2008        Lab Results   Component Value Date    WBC 8.6 02/28/2022    HEMOGLOBIN 11.2 (L) 02/28/2022    HEMATOCRIT 38.7 02/28/2022    PLATELETCT 221 02/28/2022        Total time of the discharge process exceeds 36 minutes.

## 2022-03-02 ENCOUNTER — ANTICOAGULATION VISIT (OUTPATIENT)
Dept: VASCULAR LAB | Facility: MEDICAL CENTER | Age: 47
End: 2022-03-02
Attending: INTERNAL MEDICINE
Payer: MEDICAID

## 2022-03-02 DIAGNOSIS — D68.69 SECONDARY HYPERCOAGULABLE STATE (HCC): ICD-10-CM

## 2022-03-02 DIAGNOSIS — I48.11 LONGSTANDING PERSISTENT ATRIAL FIBRILLATION (HCC): ICD-10-CM

## 2022-03-02 LAB — INR PPP: 3.7 (ref 2–3.5)

## 2022-03-02 PROCEDURE — 85610 PROTHROMBIN TIME: CPT

## 2022-03-02 PROCEDURE — 99212 OFFICE O/P EST SF 10 MIN: CPT | Performed by: NURSE PRACTITIONER

## 2022-03-02 NOTE — PROGRESS NOTES
Anticoagulation Summary  As of 3/2/2022    INR goal:  2.0-3.0   TTR:  40.2 % (10 mo)   INR used for dosing:  3.70 (3/2/2022)   Warfarin maintenance plan:  6 mg (6 mg x 1) every day   Weekly warfarin total:  42 mg   Plan last modified:  Kurt Cai, PharmD (12/29/2021)   Next INR check:  3/9/2022   Target end date:  Indefinite    Indications    Atrial fibrillation (HCC) [I48.91]  Secondary hypercoagulable state (HCC) [D68.69]             Anticoagulation Episode Summary     INR check location:      Preferred lab:      Send INR reminders to:      Comments:                  Refer to Patient Findings for HPI:  Patient Findings     Positives:  Emergency department visit, Change in medications    Negatives:  Signs/symptoms of thrombosis, Signs/symptoms of bleeding, Laboratory test error suspected, Change in health, Change in alcohol use, Change in activity, Upcoming invasive procedure, Upcoming dental procedure, Missed doses, Extra doses, Change in diet/appetite, Hospital admission, Bruising, Other complaints    Comments:  Hospitalized 2/26-2/28 for CP. Discharged on z-mita and prednisone 40 mg x 3 days for COPD exacerbation. Hasn't started taking yet - plans to start both today.          There were no vitals filed for this visit.   pt declined vitals    Verified current warfarin dosing schedule.    Medications reconciled   Pt is not on antiplatelet therapy      A/P   INR  supra-therapeutic.     Warfarin dosing recommendation: HOLD tonight's dose then began reduced regimen as outlined on calendar.    Pt educated to contact our clinic with any changes in medications or s/s of bleeding or thrombosis. Pt is aware to seek immediate medical attention for falls, head injury or deep cuts.    Follow up appointment in 1 week(s).    BELÉN Zhou.

## 2022-03-03 LAB — INR BLD: 3.7 (ref 0.9–1.2)

## 2022-03-08 ENCOUNTER — HOSPITAL ENCOUNTER (EMERGENCY)
Facility: MEDICAL CENTER | Age: 47
End: 2022-03-08
Attending: STUDENT IN AN ORGANIZED HEALTH CARE EDUCATION/TRAINING PROGRAM
Payer: MEDICAID

## 2022-03-08 VITALS
WEIGHT: 292 LBS | BODY MASS INDEX: 53.73 KG/M2 | RESPIRATION RATE: 20 BRPM | SYSTOLIC BLOOD PRESSURE: 115 MMHG | OXYGEN SATURATION: 93 % | HEIGHT: 62 IN | TEMPERATURE: 97.7 F | HEART RATE: 86 BPM | DIASTOLIC BLOOD PRESSURE: 50 MMHG

## 2022-03-08 DIAGNOSIS — M54.42 ACUTE LEFT-SIDED LOW BACK PAIN WITH LEFT-SIDED SCIATICA: ICD-10-CM

## 2022-03-08 PROCEDURE — 99283 EMERGENCY DEPT VISIT LOW MDM: CPT

## 2022-03-08 RX ORDER — LIDOCAINE 50 MG/G
1 PATCH TOPICAL EVERY 24 HOURS
Qty: 10 PATCH | Refills: 0 | Status: SHIPPED | OUTPATIENT
Start: 2022-03-08 | End: 2022-08-26

## 2022-03-08 RX ORDER — GABAPENTIN 300 MG/1
300 CAPSULE ORAL 3 TIMES DAILY
Qty: 60 CAPSULE | Refills: 0 | Status: ON HOLD | OUTPATIENT
Start: 2022-03-08 | End: 2022-07-19

## 2022-03-08 RX ORDER — IBUPROFEN 600 MG/1
600 TABLET ORAL EVERY 6 HOURS PRN
Qty: 30 TABLET | Refills: 0 | Status: SHIPPED | OUTPATIENT
Start: 2022-03-08 | End: 2022-03-08

## 2022-03-08 RX ORDER — ACETAMINOPHEN 500 MG
500-1000 TABLET ORAL EVERY 6 HOURS PRN
Qty: 30 TABLET | Refills: 0 | Status: ON HOLD | OUTPATIENT
Start: 2022-03-08 | End: 2022-03-30

## 2022-03-08 ASSESSMENT — FIBROSIS 4 INDEX: FIB4 SCORE: 0.57

## 2022-03-08 NOTE — ED TRIAGE NOTES
Chief Complaint   Patient presents with   • Back Pain     Pt was sleeping when she was woken up by severe pain going all the way down her back. She had an MRI on her back last month and says there were abnormalities in a few areas, but unsure what exactly. She was able to pivot from the ems to ER stretcher.   • Numbness     Pt reporting L lower leg numbness that began when her back pain started.

## 2022-03-08 NOTE — ED PROVIDER NOTES
CHIEF COMPLAINT  Chief Complaint   Patient presents with   • Back Pain     Pt was sleeping when she was woken up by severe pain going all the way down her back. She had an MRI on her back last month and says there were abnormalities in a few areas, but unsure what exactly. She was able to pivot from the ems to ER stretcher.   • Numbness     Pt reporting L lower leg numbness that began when her back pain started.       HPI  Lorene Haro is a 46 y.o. female who presents evaluation of back pain that radiates down to her left leg.  She states that she has a history of chronic back pain had an MRI last month, which showed some abnormalities in her lower spine but she was unclear exactly what these abnormalities were.  She states over the past several days she has had increasing low back pain.  Is described as a achy sensation rated a 6 out of 10 that occasionally radiates down towards her left leg and stops astride of the knee.  It is worse with ambulation better with rest without other alleviating exacerbating factors.  States that she was previously prescribed Tylenol for this pain but she has run out and it has been worse since running out of her Tylenol.  Denies any urinary retention, saddle anesthesia or loss of bowel or bladder control history of IV drug use.    REVIEW OF SYSTEMS  See HPI for further details. All other systems are negative.     PAST MEDICAL HISTORY   has a past medical history of Asthma, Bipolar 2 disorder (Formerly [facility]), Chickenpox, Hypertension, On home oxygen therapy, Other psychological stress, Schizophrenic disorder ([facility]), and Yeast infection of the skin (4/1/2021).    SOCIAL HISTORY  Social History     Tobacco Use   • Smoking status: Current Every Day Smoker     Years: 20.00     Types: Cigarettes   • Smokeless tobacco: Never Used   • Tobacco comment: 1 cigarette/day    Vaping Use   • Vaping Use: Never used   Substance and Sexual Activity   • Alcohol use: No   • Drug use: No   • Sexual activity:  "Not Currently       SURGICAL HISTORY   has a past surgical history that includes hysterectomy laparoscopy; colectomy; cholecystectomy; and knee arthroscopy (Left).    CURRENT MEDICATIONS  Home Medications    **Home medications have not yet been reviewed for this encounter**         ALLERGIES  Allergies   Allergen Reactions   • Penicillin G Rash and Itching     pt reports that she gets a rash all over her body and gets itchy   • Amoxicillin Rash and Itching     Tolerates cephalosporins, pt reports that she gets a rash all over her body and gets itchy       FAMILY HISTORY  No pertinent family history    PHYSICAL EXAM   /76   Pulse 86   Temp 36.8 °C (98.3 °F) (Tympanic)   Resp 20   Ht 1.575 m (5' 2\")   Wt (!) 132 kg (292 lb)   LMP  (LMP Unknown)   SpO2 93%   BMI 53.41 kg/m²  @ALISSON[[id number]::@   Pulse ox interpretation: I interpret this pulse ox as normal.  VITALS - vital signs documented prior to this note have been reviewed and noted,  GENERAL - awake, alert, oriented, GCS 15, no apparent distress, non-toxic  appearing  HEENT - normocephalic, atraumatic, pupils equal, sclera anicteric, mucus  membranes moist  NECK - supple, no meningismus, full active range of motion, trachea midline  CARDIOVASCULAR - regular rate/rhythm, no murmurs/gallops/rubs  PULMONARY - no respiratory distress, speaking in full sentences, clear to  auscultation bilaterally, no wheezing/ronchi/rales, no accessory muscle use  GASTROINTESTINAL - soft, non-tender, non-distended, no rebound, guarding,  or peritonitis  Back: Mild tenderness with palpation of her left lower paraspinal musculature positive straight leg test on the left patellar DTRs are 2+ ambulatory with a steady gait lower extremity strength is 5-5 sensation is intact in all extremities.  GENITOURINARY - Deferred  NEUROLOGIC - Awake alert, normal mental status, speech fluid, cognition  normal, moves all extremities  MUSCULOSKELETAL - no obvious asymmetry or deformities " present  EXTREMITIES - warm, well-perfused, no cyanosis or significant edema  DERMATOLOGIC - warm, dry, no rashes, no jaundice  PSYCHIATRIC - normal affect, normal insight, normal concentration            LABS  Labs Reviewed - No data to display    Results for orders placed or performed in visit on 03/02/22   POCT Protime   Result Value Ref Range    INR 3.70 (A) 2 - 3.5   POCT INR device results   Result Value Ref Range    POC INR 3.7 (H) 0.9 - 1.2         Pertinent Labs & Imaging studies reviewed. (See chart for details)    RADIOLOGY  No orders to display             ED COURSE     Medications - No data to display    MEDICAL DECISION MAKING  Patient presented for evaluation of low back pain.  On examination she does have tenderness with palpation of her paraspinal muscular tenderness no midline or bony tenderness.  She has no back pain red flags no saddle anesthesia loss of bowel or bladder control, history of IV drug use, cancers.  Otherwise normal and reassuring physical exam.  Overall my clinical suspicion for underlying cauda equina space-occupying lesion epidural abscess epidural bleed, aortic dissection aneurysm or other more nefarious cause of her back pain are low.  History and physical exam seems consistent with a with sciatica.  We will treat the patient symptomatically.  All pertinent return precautions were discussed with the patient, and they expressed understanding.  Patient was discharged in a stable condition      The patient will not drink alcohol nor drive with prescribed medications. The patient will return for worsening symptoms and is stable at the time of discharge. The patient verbalizes understanding and will comply.    FINAL IMPRESSION  1.  Sciatica  2.  Morbid obesity           Electronically signed by: Familia Herron, 3/8/2022 1:11 AM      Dictation Disclaimer  Please note this report has been produced using speech recognition software and  may contain errors related to that system,  including errors seen in grammar,  punctuation and spelling, as well as words and phrases that may be inappropriate.  If there are any questions or concerns, please feel free to contact the dictating  physician for clarification.

## 2022-03-08 NOTE — ED NOTES
Assist RN: Provided pt with her personal Medicaid information and MTM contact information. Pt ambulatory to lobby with steady gait using personal walker with all personal belongings.

## 2022-03-08 NOTE — DISCHARGE INSTRUCTIONS
You were seen in the emergency department for back pain.  The tests we perform do not  always reveal the exact cause of the pain. If your symptoms do not go away or if they get  worse, you may need additional imaging studies such as Magnetic Resonance Imaging  (MRI) to help your doctor find the exact cause of your pain. Please follow-up with your  doctor or a spine specialist for further evaluation. Return to the emergency department  immediately if:  -you have difficulty controlling your bladder or bowels  -severe pain  -weakness in one or both legs  -fever.

## 2022-03-09 ENCOUNTER — ANTICOAGULATION VISIT (OUTPATIENT)
Dept: VASCULAR LAB | Facility: MEDICAL CENTER | Age: 47
End: 2022-03-09
Attending: INTERNAL MEDICINE
Payer: MEDICAID

## 2022-03-09 DIAGNOSIS — D68.69 SECONDARY HYPERCOAGULABLE STATE (HCC): ICD-10-CM

## 2022-03-09 LAB — INR PPP: 2 (ref 2–3.5)

## 2022-03-09 PROCEDURE — 99211 OFF/OP EST MAY X REQ PHY/QHP: CPT

## 2022-03-09 PROCEDURE — 85610 PROTHROMBIN TIME: CPT

## 2022-03-09 NOTE — PROGRESS NOTES
Anticoagulation Summary  As of 3/9/2022    INR goal:  2.0-3.0   TTR:  40.6 % (10.3 mo)   INR used for dosin.00 (3/9/2022)   Warfarin maintenance plan:  6 mg (6 mg x 1) every day   Weekly warfarin total:  42 mg   Plan last modified:  Kurt Cai PharmD (2021)   Next INR check:  3/16/2022   Target end date:  Indefinite    Indications    Atrial fibrillation (HCC) [I48.91]  Secondary hypercoagulable state (HCC) [D68.69]             Anticoagulation Episode Summary     INR check location:      Preferred lab:      Send INR reminders to:      Comments:                  Refer to Patient Findings for HPI:  Patient Findings     Negatives:  Signs/symptoms of thrombosis, Signs/symptoms of bleeding, Laboratory test error suspected, Change in health, Change in alcohol use, Change in activity, Upcoming invasive procedure, Emergency department visit, Upcoming dental procedure, Missed doses, Extra doses, Change in medications, Change in diet/appetite, Hospital admission, Bruising, Other complaints    Comments:  Stopped Prednisone on Saturday          There were no vitals filed for this visit.   pt declined vitals    Verified current warfarin dosing schedule.    Medications reconciled   Pt is not on antiplatelet therapy      A/P   INR is -therapeutic.     Warfarin dosing recommendation: Given patient has stopped prednisone will resume normal regimen. Pt is to continue with current warfarin dosing regimen.      Pt educated to contact our clinic with any changes in medications or s/s of bleeding or thrombosis. Pt is aware to seek immediate medical attention for falls, head injury or deep cuts.    Follow up appointment in 1 week(s).    Kurt Cai PharmD

## 2022-03-10 LAB — INR BLD: 2 (ref 0.9–1.2)

## 2022-03-16 ENCOUNTER — ANTICOAGULATION VISIT (OUTPATIENT)
Dept: VASCULAR LAB | Facility: MEDICAL CENTER | Age: 47
End: 2022-03-16
Attending: INTERNAL MEDICINE
Payer: MEDICAID

## 2022-03-16 ENCOUNTER — OFFICE VISIT (OUTPATIENT)
Dept: SLEEP MEDICINE | Facility: MEDICAL CENTER | Age: 47
End: 2022-03-16
Payer: MEDICAID

## 2022-03-16 VITALS
BODY MASS INDEX: 53.92 KG/M2 | RESPIRATION RATE: 16 BRPM | OXYGEN SATURATION: 94 % | HEIGHT: 62 IN | HEART RATE: 85 BPM | WEIGHT: 293 LBS | DIASTOLIC BLOOD PRESSURE: 82 MMHG | SYSTOLIC BLOOD PRESSURE: 122 MMHG

## 2022-03-16 DIAGNOSIS — J96.11 CHRONIC RESPIRATORY FAILURE WITH HYPOXIA AND HYPERCAPNIA (HCC): ICD-10-CM

## 2022-03-16 DIAGNOSIS — G47.33 OSA AND COPD OVERLAP SYNDROME (HCC): ICD-10-CM

## 2022-03-16 DIAGNOSIS — J44.9 OSA AND COPD OVERLAP SYNDROME (HCC): ICD-10-CM

## 2022-03-16 DIAGNOSIS — J96.12 CHRONIC RESPIRATORY FAILURE WITH HYPOXIA AND HYPERCAPNIA (HCC): ICD-10-CM

## 2022-03-16 DIAGNOSIS — G47.33 OSA (OBSTRUCTIVE SLEEP APNEA): ICD-10-CM

## 2022-03-16 DIAGNOSIS — E66.01 CLASS 3 SEVERE OBESITY DUE TO EXCESS CALORIES WITH SERIOUS COMORBIDITY AND BODY MASS INDEX (BMI) OF 50.0 TO 59.9 IN ADULT (HCC): ICD-10-CM

## 2022-03-16 DIAGNOSIS — D68.69 SECONDARY HYPERCOAGULABLE STATE (HCC): ICD-10-CM

## 2022-03-16 DIAGNOSIS — J45.50 SEVERE PERSISTENT ASTHMA WITHOUT COMPLICATION: ICD-10-CM

## 2022-03-16 LAB
INR BLD: 3.2 (ref 0.9–1.2)
INR PPP: 3.2 (ref 2–3.5)

## 2022-03-16 PROCEDURE — 99213 OFFICE O/P EST LOW 20 MIN: CPT | Performed by: PHYSICIAN ASSISTANT

## 2022-03-16 PROCEDURE — 99212 OFFICE O/P EST SF 10 MIN: CPT

## 2022-03-16 PROCEDURE — 85610 PROTHROMBIN TIME: CPT

## 2022-03-16 ASSESSMENT — ENCOUNTER SYMPTOMS
PALPITATIONS: 1
HEARTBURN: 0
ORTHOPNEA: 0
SHORTNESS OF BREATH: 1
SORE THROAT: 0
INSOMNIA: 1
CHILLS: 1
SINUS PAIN: 0
HEADACHES: 0
DIZZINESS: 1
WHEEZING: 0
SPUTUM PRODUCTION: 0
COUGH: 0
WEIGHT LOSS: 0
TREMORS: 0
FEVER: 0

## 2022-03-16 ASSESSMENT — FIBROSIS 4 INDEX: FIB4 SCORE: 0.57

## 2022-03-16 NOTE — PROGRESS NOTES
CC:: Follow-up sleep apnea    HPI:  Lorene Haro is a 46 y.o. year old female here today for follow-up on COPD, severe persistent asthma, chronic respiratory failure with hypoxia and KATHIA.  Patient last seen in clinic 12/16/2021.  Patient is a current smoker with 1 to 2 cigarettes/day, history of 20 pack years.  Patient is accompanied by her caregiver.  She does live independently.    Pertinent past medical history includes chronic back pain with sciatica, COVID-19 pneumonia in October 2021, atrial fibrillation, anxiety, secondary hypercoagulable state.  Also includes lobar pneumonia, bronchitis, lower extremity edema, bipolar/schizophrenia, obesity hypoventilation syndrome.  She reports transient numbness in the chest.  She is seen frequently in the ER for chest pain, most recently seen for low back pain 3/8/2022.  She is currently taking anger management classes.    Reviewed in clinic vitals including /82, HR 85, O2 sat 94% on room air, BMI of 53.59 kg/m².    Reviewed home medication regimen including Symbicort 160, Flonase, Singulair, Spiriva HandiHaler, warfarin, famotidine, nitroglycerin and albuterol. Patient uses O2 at 3 L at night and reports having lost her CPAP during her last residence move.  Also reports losing her nebulizer during that move and requests replacement for both.  Prior to losing her equipment patient reports benefit from use.    Reviewed most recent imaging including echocardiogram obtained 2/27/2022 demonstrating mildly dilated left ventricle, mild concentric left ventricular hypertrophy, hyperdynamic left ventricular systolic function and normal diastolic function.  LVEF estimated 70%.  Normal right ventricular size and systolic function.  Mild tricuspid regurgitation.  Estimated RVSP of 35 mmHg.    Chest x-ray obtained 2/26/2022 demonstrated stable cardiomegaly and no acute cardiopulmonary process.    Pulmonary function testing obtained 9/21/2021 demonstrated an FEV1 of 1.24 L  or 48% predicted, FVC of 1.45 L of 46% predicted, FEV1/FVC ratio of 86 suggesting restriction.  Significant bronchodilator response with improvement of 12% in FEV1 postbronchodilator.  Total lung capacity is just below the lower limits of normal at 78% predicted, diffusion capacity within normal limits at 85% predicted.  Per pulmonologist interpretation mixed restrictive obstructive defect with significant bronchodilator response, could be consistent with COPD and patient with morbid obesity.     Last polysomnogram obtained 2/25/2019 demonstrated severe obstructive sleep apnea/hypopnea syndrome with ineffective titration.  Overnight oximetry obtained 8/1/2019 demonstrated adequate O2 saturations on AVAPS: IPAP 25/15 cm H2O pressure and EPAP 11 cm H2O pressure, with low O2 sat of 88%, mean O2 sat 90% and baseline 92%.  DARIELA of 5.1/h with 2 L O2 bleed in.  Patient was subsequently ordered 3 L O2 bleed in.  Her [employer] is [employer]      Review of Systems   Constitutional: Positive for chills (some, jacket on/off) and malaise/fatigue (moderate ). Negative for fever and weight loss.   HENT: Positive for congestion (uses nasal spray). Negative for hearing loss, nosebleeds, sinus pain, sore throat and tinnitus.    Respiratory: Positive for shortness of breath (with activity ). Negative for cough, sputum production and wheezing.    Cardiovascular: Positive for palpitations and leg swelling. Negative for chest pain and orthopnea.   Gastrointestinal: Negative for heartburn.        Missing teeth, no swallowing difficulty    Neurological: Positive for dizziness (occasional with position change). Negative for tremors and headaches (occasional).   Psychiatric/Behavioral: The patient has insomnia (falling asleep ).        Past Medical History:   Diagnosis Date   • Asthma    • Bipolar 2 disorder (HCC)    • Chickenpox    • Hypertension    • On home oxygen therapy    • Other psychological stress    • Schizophrenic disorder (HCC)    •  "Yeast infection of the skin 4/1/2021       Past Surgical History:   Procedure Laterality Date   • CHOLECYSTECTOMY     • COLECTOMY     • HYSTERECTOMY LAPAROSCOPY     • KNEE ARTHROSCOPY Left        Family History   Problem Relation Age of Onset   • No Known Problems Mother    • Cancer Sister        Social History     Socioeconomic History   • Marital status: Single     Spouse name: Not on file   • Number of children: Not on file   • Years of education: Not on file   • Highest education level: Not on file   Occupational History   • Not on file   Tobacco Use   • Smoking status: Current Every Day Smoker     Years: 20.00     Types: Cigarettes   • Smokeless tobacco: Never Used   • Tobacco comment: 1 cigarette/day    Vaping Use   • Vaping Use: Never used   Substance and Sexual Activity   • Alcohol use: No   • Drug use: No   • Sexual activity: Not Currently   Other Topics Concern   • Not on file   Social History Narrative    From [place]     Social Determinants of Health     Financial Resource Strain: Medium Risk   • Difficulty of Paying Living Expenses: Somewhat hard   Food Insecurity: Food Insecurity Present   • Worried About Running Out of Food in the Last Year: Sometimes true   • Ran Out of Food in the Last Year: Sometimes true   Transportation Needs: No Transportation Needs   • Lack of Transportation (Medical): No   • Lack of Transportation (Non-Medical): No   Physical Activity: Not on file   Stress: Not on file   Social Connections: Not on file   Intimate Partner Violence: Not on file   Housing Stability: Not on file       Allergies as of 03/16/2022 - Reviewed 03/08/2022   Allergen Reaction Noted   • Penicillin g Rash and Itching 10/20/2020   • Amoxicillin Rash and Itching 02/26/2022        @Vital signs for this encounter:  /82   Pulse 85   Resp 16   Ht 1.575 m (5' 2\")   Wt (!) 133 kg (293 lb)   SpO2 94%     Current medications as of today   Current Outpatient Medications   Medication Sig Dispense Refill   • " acetaminophen (TYLENOL) 500 MG Tab Take 1-2 Tablets by mouth every 6 hours as needed. 30 Tablet 0   • lidocaine (LIDODERM) 5 % Patch Place 1 Patch on the skin every 24 hours. 10 Patch 0   • gabapentin (NEURONTIN) 300 MG Cap Take 1 Capsule by mouth 3 times a day. 60 Capsule 0   • fluticasone (FLONASE) 50 MCG/ACT nasal spray ADMOINISTER 1 TO 2 SPRAYS INTO AFFECTED NOSTRIL(S) TWICE DAILY 16 g 3   • albuterol (PROVENTIL) 2.5mg/3ml Nebu Soln solution for nebulization Take 3 mL by nebulization every four hours as needed for Shortness of Breath for up to 30 days. 30 Each 0   • montelukast (SINGULAIR) 10 MG Tab TAKE 1 TABLET BY MOUTH EVERY NIGHT AT BEDTIME 90 Tablet 3   • budesonide-formoterol (SYMBICORT) 160-4.5 MCG/ACT Aerosol Inhale 2 Puffs 2 times a day. 3 Each 3   • tiotropium (SPIRIVA HANDIHALER) 18 MCG Cap Place 1 Capsule into inhaler and inhale every day. 30 Capsule 3   • warfarin (COUMADIN) 6 MG Tab Take one to one and one-half (1-1.5) tablets daily as directed by [facility] (Patient taking differently: Take 6 mg by mouth every evening.) 135 Tablet 1   • acetaminophen (TYLENOL) 325 MG Tab Take 325 mg by mouth every 8 hours as needed (Generalized pain). Prescription is for 4 tabs every 8 hours prn, patient states she takes 2 tabls every 4 hours prn     • atorvastatin (LIPITOR) 20 MG Tab Take 20 mg by mouth at bedtime.     • traZODone (DESYREL) 50 MG Tab Take 100 mg by mouth at bedtime.     • famotidine (PEPCID) 40 MG Tab Take 40 mg by mouth every day. Indications: Heartburn     • nitroglycerin (NITROSTAT) 0.4 MG SL Tab Place 1 tablet under the tongue as needed for Chest Pain. 25 tablet 0   • albuterol 108 (90 Base) MCG/ACT Aero Soln inhalation aerosol Inhale 2 Puffs every 6 hours as needed for Shortness of Breath. 8.5 g 0   • aripiprazole (ABILIFY) 30 MG tablet Take 30 mg by mouth every morning.       No current facility-administered medications for this visit.         Physical Exam:   Gen:            Alert and oriented, No apparent distress. Mood and affect appropriate, normal interaction with provider.  Eyes:          sclere white, conjunctive moist.  Hearing:     Grossly intact.  Dentition:    Poor dentition.  Oropharynx:   Tongue normal, posterior pharynx without erythema or exudate.  Neck:        Supple, trachea midline, no masses.  Respiratory Effort: No intercostal retractions or use of accessory muscles.   Lung Auscultation:      Decreased throughout; no rales, rhonchi or wheezing.  CV:            Regular rate and rhythm.  Mild lower extremity edema. No murmurs, rubs or gallops.  Digits, Nails, Ext: No clubbing, cyanosis, petechiae, or nodes.   Skin:        No rashes, lesions or ulcers noted on exposed skin surfaces.                     Assessment:  1. Severe persistent asthma without complication  DME Nebulizer   2. Chronic respiratory failure with hypoxia and hypercapnia (Formerly [facility])     3. KATHIA and COPD overlap syndrome (Former[facility])     4. KATHIA (obstructive sleep apnea)  DME Mask Fitting    DME Other    CANCELED: DME CPAP   5. Class 3 severe obesity due to excess calories with serious comorbidity and body mass index (BMI) of 50.0 to 59.9 in adult (Formerly [facility])         Immunizations:    Flu: 12/8/2021  Pneumovax 23: 11/20/2018  Prevnar 13: Deferred  Pfizer SARS-CoV-2 vaccine: 12/15/2021, 5/24/2021, 4/22/2021    Plan:  46 y.o. year old female here today for follow-up on COPD, severe persistent asthma, chronic respiratory failure with hypoxia and KATHIA.     Current smoker: She is limited to 2 cigarettes/day.  Complete smoking cessation advised.    Severe persistent asthma: Benefiting from current regimen, lost her nebulizer.  Requesting replacement.  Order placed.    KATHIA/KATHIA overlap:  Patient previously on AVAPS, stated benefit from use but lost her equipment during move to new place.  She is requesting replacement and mask refitting as most recent mask was too small.  Order placed.    Elevated BMI greater than 50: Discussion  regarding impact central adiposity on pulmonary function.  Encourage increase activity and decreased nutritional intake.     Follow-up in 3 months.    This dictation was created using voice recognition software. The accuracy of the dictation is limited to the abilities of the software. I expect there may be some errors of grammar and possibly content.

## 2022-03-16 NOTE — PATIENT INSTRUCTIONS
1-patient in need of respiratory equipment, lost in move  2-lives alone with assistance from service  3-will reorder CPAP and nebulizer  4-needs mask fitting because mask was too tight-order placed  5-follow up in 3 months

## 2022-03-16 NOTE — PROGRESS NOTES
Anticoagulation Summary  As of 3/16/2022    INR goal:  2.0-3.0   TTR:  41.6 % (10.5 mo)   INR used for dosing:  3.20 (3/16/2022)   Warfarin maintenance plan:  6 mg (6 mg x 1) every day   Weekly warfarin total:  42 mg   Plan last modified:  Kurt Cai PharmD (12/29/2021)   Next INR check:  3/24/2022   Target end date:  Indefinite    Indications    Atrial fibrillation (HCC) [I48.91]  Secondary hypercoagulable state (HCC) [D68.69]             Anticoagulation Episode Summary     INR check location:      Preferred lab:      Send INR reminders to:      Comments:                  Refer to Patient Findings for HPI:  Patient Findings     Negatives:  Signs/symptoms of thrombosis, Signs/symptoms of bleeding, Laboratory test error suspected, Change in health, Change in alcohol use, Change in activity, Upcoming invasive procedure, Emergency department visit, Upcoming dental procedure, Missed doses, Extra doses, Change in medications, Change in diet/appetite, Hospital admission, Bruising, Other complaints          There were no vitals filed for this visit.  Pt declines vitals today.     Verified current warfarin dosing schedule.    Medications reconciled   Pt is not on antiplatelet therapy      A/P   INR  SUPRA-therapeutic.     Warfarin dosing recommendation: Instructed pt to take a decreased dose of 3 mg today and to then continue on w/ her current regimen.    Pt educated to contact our clinic with any changes in medications or s/s of bleeding or thrombosis. Pt is aware to seek immediate medical attention for falls, head injury or deep cuts.    Follow up appointment in 1 week(s).    Chris Kang, PharmD, BCACP

## 2022-03-22 ENCOUNTER — HOSPITAL ENCOUNTER (INPATIENT)
Facility: MEDICAL CENTER | Age: 47
LOS: 7 days | DRG: 291 | End: 2022-03-30
Attending: EMERGENCY MEDICINE | Admitting: FAMILY MEDICINE
Payer: MEDICAID

## 2022-03-22 ENCOUNTER — APPOINTMENT (OUTPATIENT)
Dept: RADIOLOGY | Facility: MEDICAL CENTER | Age: 47
DRG: 291 | End: 2022-03-22
Attending: EMERGENCY MEDICINE
Payer: MEDICAID

## 2022-03-22 DIAGNOSIS — R60.9 PERIPHERAL EDEMA: ICD-10-CM

## 2022-03-22 DIAGNOSIS — R53.81 DEBILITY: ICD-10-CM

## 2022-03-22 DIAGNOSIS — L03.119 CELLULITIS OF LOWER EXTREMITY, UNSPECIFIED LATERALITY: ICD-10-CM

## 2022-03-22 DIAGNOSIS — F20.9 SCHIZOPHRENIA, UNSPECIFIED TYPE (HCC): ICD-10-CM

## 2022-03-22 DIAGNOSIS — E87.79 OTHER HYPERVOLEMIA: ICD-10-CM

## 2022-03-22 LAB
ALBUMIN SERPL BCP-MCNC: 3.9 G/DL (ref 3.2–4.9)
ALBUMIN/GLOB SERPL: 1.1 G/DL
ALP SERPL-CCNC: 137 U/L (ref 30–99)
ALT SERPL-CCNC: 11 U/L (ref 2–50)
ANION GAP SERPL CALC-SCNC: 11 MMOL/L (ref 7–16)
ANISOCYTOSIS BLD QL SMEAR: ABNORMAL
APTT PPP: 36.7 SEC (ref 24.7–36)
AST SERPL-CCNC: 14 U/L (ref 12–45)
BASOPHILS # BLD AUTO: 0.2 % (ref 0–1.8)
BASOPHILS # BLD: 0.02 K/UL (ref 0–0.12)
BILIRUB SERPL-MCNC: 0.5 MG/DL (ref 0.1–1.5)
BUN SERPL-MCNC: 8 MG/DL (ref 8–22)
CALCIUM SERPL-MCNC: 9.4 MG/DL (ref 8.5–10.5)
CHLORIDE SERPL-SCNC: 102 MMOL/L (ref 96–112)
CO2 SERPL-SCNC: 27 MMOL/L (ref 20–33)
COMMENT 1642: NORMAL
CREAT SERPL-MCNC: 0.75 MG/DL (ref 0.5–1.4)
CRP SERPL HS-MCNC: 1.98 MG/DL (ref 0–0.75)
EKG IMPRESSION: NORMAL
EOSINOPHIL # BLD AUTO: 0.11 K/UL (ref 0–0.51)
EOSINOPHIL NFR BLD: 1.1 % (ref 0–6.9)
ERYTHROCYTE [DISTWIDTH] IN BLOOD BY AUTOMATED COUNT: 61.2 FL (ref 35.9–50)
GFR SERPLBLD CREATININE-BSD FMLA CKD-EPI: 98 ML/MIN/1.73 M 2
GLOBULIN SER CALC-MCNC: 3.4 G/DL (ref 1.9–3.5)
GLUCOSE SERPL-MCNC: 98 MG/DL (ref 65–99)
HCT VFR BLD AUTO: 40.4 % (ref 37–47)
HGB BLD-MCNC: 11.9 G/DL (ref 12–16)
HYPOCHROMIA BLD QL SMEAR: ABNORMAL
IMM GRANULOCYTES # BLD AUTO: 0.03 K/UL (ref 0–0.11)
IMM GRANULOCYTES NFR BLD AUTO: 0.3 % (ref 0–0.9)
INR PPP: 2.11 (ref 0.87–1.13)
LYMPHOCYTES # BLD AUTO: 1.8 K/UL (ref 1–4.8)
LYMPHOCYTES NFR BLD: 17.5 % (ref 22–41)
MCH RBC QN AUTO: 25.5 PG (ref 27–33)
MCHC RBC AUTO-ENTMCNC: 29.5 G/DL (ref 33.6–35)
MCV RBC AUTO: 86.5 FL (ref 81.4–97.8)
MICROCYTES BLD QL SMEAR: ABNORMAL
MONOCYTES # BLD AUTO: 0.63 K/UL (ref 0–0.85)
MONOCYTES NFR BLD AUTO: 6.1 % (ref 0–13.4)
MORPHOLOGY BLD-IMP: NORMAL
NEUTROPHILS # BLD AUTO: 7.72 K/UL (ref 2–7.15)
NEUTROPHILS NFR BLD: 74.8 % (ref 44–72)
NRBC # BLD AUTO: 0 K/UL
NRBC BLD-RTO: 0 /100 WBC
NT-PROBNP SERPL IA-MCNC: 552 PG/ML (ref 0–125)
OVALOCYTES BLD QL SMEAR: NORMAL
PLATELET # BLD AUTO: 261 K/UL (ref 164–446)
PLATELET BLD QL SMEAR: NORMAL
PMV BLD AUTO: 11.2 FL (ref 9–12.9)
POIKILOCYTOSIS BLD QL SMEAR: NORMAL
POLYCHROMASIA BLD QL SMEAR: NORMAL
POTASSIUM SERPL-SCNC: 3.6 MMOL/L (ref 3.6–5.5)
PROT SERPL-MCNC: 7.3 G/DL (ref 6–8.2)
PROTHROMBIN TIME: 23 SEC (ref 12–14.6)
RBC # BLD AUTO: 4.67 M/UL (ref 4.2–5.4)
RBC BLD AUTO: PRESENT
SODIUM SERPL-SCNC: 140 MMOL/L (ref 135–145)
TROPONIN T SERPL-MCNC: 31 NG/L (ref 6–19)
WBC # BLD AUTO: 10.3 K/UL (ref 4.8–10.8)

## 2022-03-22 PROCEDURE — 84484 ASSAY OF TROPONIN QUANT: CPT

## 2022-03-22 PROCEDURE — 85025 COMPLETE CBC W/AUTO DIFF WBC: CPT

## 2022-03-22 PROCEDURE — 80053 COMPREHEN METABOLIC PANEL: CPT

## 2022-03-22 PROCEDURE — 36415 COLL VENOUS BLD VENIPUNCTURE: CPT

## 2022-03-22 PROCEDURE — 99285 EMERGENCY DEPT VISIT HI MDM: CPT

## 2022-03-22 PROCEDURE — 93005 ELECTROCARDIOGRAM TRACING: CPT | Performed by: EMERGENCY MEDICINE

## 2022-03-22 PROCEDURE — 71045 X-RAY EXAM CHEST 1 VIEW: CPT

## 2022-03-22 PROCEDURE — 85610 PROTHROMBIN TIME: CPT

## 2022-03-22 PROCEDURE — 85730 THROMBOPLASTIN TIME PARTIAL: CPT

## 2022-03-22 PROCEDURE — 84145 PROCALCITONIN (PCT): CPT

## 2022-03-22 PROCEDURE — 83880 ASSAY OF NATRIURETIC PEPTIDE: CPT

## 2022-03-22 PROCEDURE — 86140 C-REACTIVE PROTEIN: CPT

## 2022-03-22 PROCEDURE — 96374 THER/PROPH/DIAG INJ IV PUSH: CPT

## 2022-03-22 PROCEDURE — 700111 HCHG RX REV CODE 636 W/ 250 OVERRIDE (IP): Performed by: EMERGENCY MEDICINE

## 2022-03-22 PROCEDURE — 93005 ELECTROCARDIOGRAM TRACING: CPT

## 2022-03-22 RX ORDER — FUROSEMIDE 10 MG/ML
20 INJECTION INTRAMUSCULAR; INTRAVENOUS ONCE
Status: COMPLETED | OUTPATIENT
Start: 2022-03-22 | End: 2022-03-22

## 2022-03-22 RX ADMIN — FUROSEMIDE 20 MG: 10 INJECTION, SOLUTION INTRAMUSCULAR; INTRAVENOUS at 23:18

## 2022-03-22 ASSESSMENT — FIBROSIS 4 INDEX: FIB4 SCORE: 0.57

## 2022-03-23 PROBLEM — E87.70 FLUID EXCESS: Status: ACTIVE | Noted: 2022-03-23

## 2022-03-23 LAB
BLOOD CULTURE HOLD CXBCH: NORMAL
PROCALCITONIN SERPL-MCNC: <0.05 NG/ML
TROPONIN T SERPL-MCNC: 28 NG/L (ref 6–19)

## 2022-03-23 PROCEDURE — A9270 NON-COVERED ITEM OR SERVICE: HCPCS | Performed by: STUDENT IN AN ORGANIZED HEALTH CARE EDUCATION/TRAINING PROGRAM

## 2022-03-23 PROCEDURE — 770006 HCHG ROOM/CARE - MED/SURG/GYN SEMI*

## 2022-03-23 PROCEDURE — 700102 HCHG RX REV CODE 250 W/ 637 OVERRIDE(OP): Performed by: STUDENT IN AN ORGANIZED HEALTH CARE EDUCATION/TRAINING PROGRAM

## 2022-03-23 PROCEDURE — 700111 HCHG RX REV CODE 636 W/ 250 OVERRIDE (IP): Performed by: EMERGENCY MEDICINE

## 2022-03-23 PROCEDURE — 700111 HCHG RX REV CODE 636 W/ 250 OVERRIDE (IP): Performed by: STUDENT IN AN ORGANIZED HEALTH CARE EDUCATION/TRAINING PROGRAM

## 2022-03-23 PROCEDURE — 84484 ASSAY OF TROPONIN QUANT: CPT

## 2022-03-23 PROCEDURE — 96376 TX/PRO/DX INJ SAME DRUG ADON: CPT

## 2022-03-23 PROCEDURE — 99222 1ST HOSP IP/OBS MODERATE 55: CPT | Mod: GC | Performed by: FAMILY MEDICINE

## 2022-03-23 PROCEDURE — 700101 HCHG RX REV CODE 250: Performed by: EMERGENCY MEDICINE

## 2022-03-23 PROCEDURE — 96375 TX/PRO/DX INJ NEW DRUG ADDON: CPT

## 2022-03-23 PROCEDURE — 700102 HCHG RX REV CODE 250 W/ 637 OVERRIDE(OP): Performed by: FAMILY MEDICINE

## 2022-03-23 PROCEDURE — A9270 NON-COVERED ITEM OR SERVICE: HCPCS | Performed by: FAMILY MEDICINE

## 2022-03-23 PROCEDURE — 36415 COLL VENOUS BLD VENIPUNCTURE: CPT

## 2022-03-23 RX ORDER — ARIPIPRAZOLE 15 MG/1
30 TABLET ORAL EVERY MORNING
Status: DISCONTINUED | OUTPATIENT
Start: 2022-03-23 | End: 2022-03-30 | Stop reason: HOSPADM

## 2022-03-23 RX ORDER — WARFARIN SODIUM 6 MG/1
6 TABLET ORAL EVERY EVENING
Status: DISCONTINUED | OUTPATIENT
Start: 2022-03-23 | End: 2022-03-23

## 2022-03-23 RX ORDER — FUROSEMIDE 20 MG/1
20 TABLET ORAL 2 TIMES DAILY
Qty: 30 TABLET | Refills: 0 | Status: SHIPPED | OUTPATIENT
Start: 2022-03-23 | End: 2022-03-30

## 2022-03-23 RX ORDER — FUROSEMIDE 10 MG/ML
40 INJECTION INTRAMUSCULAR; INTRAVENOUS
Status: DISCONTINUED | OUTPATIENT
Start: 2022-03-23 | End: 2022-03-24

## 2022-03-23 RX ORDER — AMOXICILLIN 250 MG
2 CAPSULE ORAL 2 TIMES DAILY
Status: DISCONTINUED | OUTPATIENT
Start: 2022-03-23 | End: 2022-03-30 | Stop reason: HOSPADM

## 2022-03-23 RX ORDER — TIOTROPIUM BROMIDE 18 UG/1
1 CAPSULE ORAL; RESPIRATORY (INHALATION) DAILY
Status: DISCONTINUED | OUTPATIENT
Start: 2022-03-23 | End: 2022-03-23

## 2022-03-23 RX ORDER — WARFARIN SODIUM 6 MG/1
6 TABLET ORAL DAILY
Status: DISCONTINUED | OUTPATIENT
Start: 2022-03-23 | End: 2022-03-30 | Stop reason: HOSPADM

## 2022-03-23 RX ORDER — NITROGLYCERIN 0.4 MG/1
0.4 TABLET SUBLINGUAL
Status: DISCONTINUED | OUTPATIENT
Start: 2022-03-23 | End: 2022-03-30 | Stop reason: HOSPADM

## 2022-03-23 RX ORDER — BISACODYL 10 MG
10 SUPPOSITORY, RECTAL RECTAL
Status: DISCONTINUED | OUTPATIENT
Start: 2022-03-23 | End: 2022-03-30 | Stop reason: HOSPADM

## 2022-03-23 RX ORDER — BUDESONIDE AND FORMOTEROL FUMARATE DIHYDRATE 160; 4.5 UG/1; UG/1
2 AEROSOL RESPIRATORY (INHALATION) 2 TIMES DAILY
Status: DISCONTINUED | OUTPATIENT
Start: 2022-03-23 | End: 2022-03-30 | Stop reason: HOSPADM

## 2022-03-23 RX ORDER — CEPHALEXIN 500 MG/1
1000 CAPSULE ORAL 3 TIMES DAILY
Qty: 42 CAPSULE | Refills: 0 | Status: SHIPPED | OUTPATIENT
Start: 2022-03-23 | End: 2022-03-30

## 2022-03-23 RX ORDER — ALBUTEROL SULFATE 90 UG/1
2 AEROSOL, METERED RESPIRATORY (INHALATION) EVERY 6 HOURS PRN
Status: DISCONTINUED | OUTPATIENT
Start: 2022-03-23 | End: 2022-03-23

## 2022-03-23 RX ORDER — FAMOTIDINE 20 MG/1
40 TABLET, FILM COATED ORAL DAILY
Status: DISCONTINUED | OUTPATIENT
Start: 2022-03-23 | End: 2022-03-30 | Stop reason: HOSPADM

## 2022-03-23 RX ORDER — SPIRONOLACTONE 25 MG/1
25 TABLET ORAL
Status: DISCONTINUED | OUTPATIENT
Start: 2022-03-23 | End: 2022-03-30 | Stop reason: HOSPADM

## 2022-03-23 RX ORDER — MONTELUKAST SODIUM 10 MG/1
10 TABLET ORAL
Status: DISCONTINUED | OUTPATIENT
Start: 2022-03-23 | End: 2022-03-30 | Stop reason: HOSPADM

## 2022-03-23 RX ORDER — ATORVASTATIN CALCIUM 20 MG/1
20 TABLET, FILM COATED ORAL
Status: DISCONTINUED | OUTPATIENT
Start: 2022-03-23 | End: 2022-03-30 | Stop reason: HOSPADM

## 2022-03-23 RX ORDER — POLYETHYLENE GLYCOL 3350 17 G/17G
1 POWDER, FOR SOLUTION ORAL
Status: DISCONTINUED | OUTPATIENT
Start: 2022-03-23 | End: 2022-03-30 | Stop reason: HOSPADM

## 2022-03-23 RX ADMIN — FUROSEMIDE 40 MG: 10 INJECTION, SOLUTION INTRAMUSCULAR; INTRAVENOUS at 18:54

## 2022-03-23 RX ADMIN — ATORVASTATIN CALCIUM 20 MG: 20 TABLET, FILM COATED ORAL at 22:59

## 2022-03-23 RX ADMIN — MONTELUKAST 10 MG: 10 TABLET, FILM COATED ORAL at 22:59

## 2022-03-23 RX ADMIN — SPIRONOLACTONE 25 MG: 25 TABLET, FILM COATED ORAL at 08:17

## 2022-03-23 RX ADMIN — ARIPIPRAZOLE 30 MG: 15 TABLET ORAL at 18:56

## 2022-03-23 RX ADMIN — FUROSEMIDE 40 MG: 10 INJECTION, SOLUTION INTRAMUSCULAR; INTRAVENOUS at 08:17

## 2022-03-23 RX ADMIN — WATER 2000 MG: 100 INJECTION, SOLUTION INTRAVENOUS at 05:55

## 2022-03-23 RX ADMIN — WARFARIN SODIUM 6 MG: 6 TABLET ORAL at 18:00

## 2022-03-23 ASSESSMENT — ENCOUNTER SYMPTOMS
VOMITING: 0
CHILLS: 0
BLURRED VISION: 0
HEADACHES: 0
FEVER: 0
SHORTNESS OF BREATH: 0
SEIZURES: 0
COUGH: 0
SORE THROAT: 0
NECK PAIN: 0
FOCAL WEAKNESS: 0
BACK PAIN: 0
EYE REDNESS: 0
ABDOMINAL PAIN: 0

## 2022-03-23 ASSESSMENT — CHA2DS2 SCORE
VASCULAR DISEASE: NO
HYPERTENSION: YES
CHF OR LEFT VENTRICULAR DYSFUNCTION: NO
AGE 65 TO 74: NO
CHA2DS2 VASC SCORE: 2
PRIOR STROKE OR TIA OR THROMBOEMBOLISM: NO
DIABETES: NO
SEX: FEMALE
AGE 75 OR GREATER: NO

## 2022-03-23 ASSESSMENT — PAIN DESCRIPTION - PAIN TYPE: TYPE: CHRONIC PAIN

## 2022-03-23 NOTE — H&P
"[facility] MEDICINE HISTORY AND PHYSICAL     PATIENT ID:  NAME:  Lorene Haro  MRN:               [id number]  YOB: 1975    Date of Admission:   3/22/2022     Attending:   David Bourne M.D.    Resident:   Bud Han M.D.    Primary Care Physician:    Mallika Sharp M.D.     CC:    Low extremity swelling.      HPI:   Lorene Haro is a 46 y.o. female with schizophrenia, A. fib on warfarin, KATHIA, and COPD who presented to the emergency department complaining of bilateral lower extremity swelling.  Upon my evaluation the patient is sleeping.  She wakes to voice, however she immediately goes back to sleep.  Does not answer questioning.  Discussed with nursing staff who reported she was interactive and of normal mentation prior to going to sleep.  Now she is just sleepy.      Per ERP note \"Patient states that she woke up 3 days ago with acute onset of the symptoms.  She reports pain and swelling to bilateral extremities that is worse in the left than the right.  She states that she used to be on a diuretic medication however they took her off of it approximately a year ago.  She is taking all of her other medications as prescribed.  No fevers, chest pain or significant shortness of breath.  She is on 3 L of oxygen at baseline.\"    Currently the patient is resting comfortably.     REVIEW OF SYSTEMS:   Patient not cooperative             PAST MEDICAL HISTORY:  Past Medical History:   Diagnosis Date   • Asthma    • Bipolar 2 disorder (HCC)    • Chickenpox    • Hypertension    • On home oxygen therapy    • Other psychological stress    • Schizophrenic disorder (HCC)    • Yeast infection of the skin 4/1/2021       PAST SURGICAL HISTORY:  Past Surgical History:   Procedure Laterality Date   • CHOLECYSTECTOMY     • COLECTOMY     • HYSTERECTOMY LAPAROSCOPY     • KNEE ARTHROSCOPY Left        FAMILY HISTORY:  Family History   Problem Relation Age of Onset   • No Known Problems Mother    • Cancer Sister  "       SOCIAL HISTORY:   Patient not cooperative    DIET:   No orders of the defined types were placed in this encounter.      ALLERGIES:  Allergies   Allergen Reactions   • Penicillin G Rash and Itching     pt reports that she gets a rash all over her body and gets itchy   • Amoxicillin Rash and Itching     Tolerates cephalosporins, pt reports that she gets a rash all over her body and gets itchy       OUTPATIENT MEDICATIONS:    Current Facility-Administered Medications:   •  ceFAZolin (Ancef) IV syringe 2,000 mg, 2,000 mg, Intravenous, Once, Natalia South M.D.    Current Outpatient Medications:   •  furosemide (LASIX) 20 MG Tab, Take 1 Tablet by mouth 2 times a day., Disp: 30 Tablet, Rfl: 0  •  cephALEXin (KEFLEX) 500 MG Cap, Take 2 Capsules by mouth 3 times a day for 7 days., Disp: 42 Capsule, Rfl: 0  •  acetaminophen (TYLENOL) 500 MG Tab, Take 1-2 Tablets by mouth every 6 hours as needed., Disp: 30 Tablet, Rfl: 0  •  lidocaine (LIDODERM) 5 % Patch, Place 1 Patch on the skin every 24 hours., Disp: 10 Patch, Rfl: 0  •  gabapentin (NEURONTIN) 300 MG Cap, Take 1 Capsule by mouth 3 times a day., Disp: 60 Capsule, Rfl: 0  •  fluticasone (FLONASE) 50 MCG/ACT nasal spray, ADMOINISTER 1 TO 2 SPRAYS INTO AFFECTED NOSTRIL(S) TWICE DAILY, Disp: 16 g, Rfl: 3  •  albuterol (PROVENTIL) 2.5mg/3ml Nebu Soln solution for nebulization, Take 3 mL by nebulization every four hours as needed for Shortness of Breath for up to 30 days., Disp: 30 Each, Rfl: 0  •  montelukast (SINGULAIR) 10 MG Tab, TAKE 1 TABLET BY MOUTH EVERY NIGHT AT BEDTIME, Disp: 90 Tablet, Rfl: 3  •  budesonide-formoterol (SYMBICORT) 160-4.5 MCG/ACT Aerosol, Inhale 2 Puffs 2 times a day., Disp: 3 Each, Rfl: 3  •  tiotropium (SPIRIVA HANDIHALER) 18 MCG Cap, Place 1 Capsule into inhaler and inhale every day., Disp: 30 Capsule, Rfl: 3  •  warfarin (COUMADIN) 6 MG Tab, Take one to one and one-half (1-1.5) tablets daily as directed by [facility]  (Patient taking differently: Take 6 mg by mouth every evening.), Disp: 135 Tablet, Rfl: 1  •  acetaminophen (TYLENOL) 325 MG Tab, Take 325 mg by mouth every 8 hours as needed (Generalized pain). Prescription is for 4 tabs every 8 hours prn, patient states she takes 2 tabls every 4 hours prn, Disp: , Rfl:   •  atorvastatin (LIPITOR) 20 MG Tab, Take 20 mg by mouth at bedtime., Disp: , Rfl:   •  traZODone (DESYREL) 50 MG Tab, Take 100 mg by mouth at bedtime., Disp: , Rfl:   •  famotidine (PEPCID) 40 MG Tab, Take 40 mg by mouth every day. Indications: Heartburn, Disp: , Rfl:   •  nitroglycerin (NITROSTAT) 0.4 MG SL Tab, Place 1 tablet under the tongue as needed for Chest Pain., Disp: 25 tablet, Rfl: 0  •  albuterol 108 (90 Base) MCG/ACT Aero Soln inhalation aerosol, Inhale 2 Puffs every 6 hours as needed for Shortness of Breath., Disp: 8.5 g, Rfl: 0  •  aripiprazole (ABILIFY) 30 MG tablet, Take 30 mg by mouth every morning., Disp: , Rfl:     PHYSICAL EXAM:  Vitals:    22 0001 22 0131 22 0301 22 0441   BP: 141/70 136/70 129/65 144/68   Pulse: 84 88 78 85   Resp:       Temp:       TempSrc:       SpO2: 99% 100% 100% 98%   Weight:       Height:       , Temp (24hrs), Av.4 °C (97.6 °F), Min:36.3 °C (97.4 °F), Max:36.6 °C (97.8 °F)  , Pulse Oximetry: 98 %    General: Pt resting in NAD, cooperative   Skin:  Pink, warm and dry.  No rashes  HEENT: NC/AT. PERRL. EOMI. MMM. No nasal discharge.Neck:  Supple without lymphadenopathy or rigidity. No JVD   Lungs:  Symmetrical.  CTAB with no W/R/R.  Good air movement   Cardiovascular:  S1/S2 RRR without M/R/G.  Abdomen:  Abdomen is soft NT/ND. +BS. No masses noted.  Genitourinary: Deferred  Extremities:  No gross deformities noted. 2+ pulses in all extremities.  Diffuse edema in the lower extremities bilaterally.  CNS: Grossly nonfocal.      LAB TESTS:   Recent Labs     22  2208   WBC 10.3   RBC 4.67   HEMOGLOBIN 11.9*   HEMATOCRIT 40.4   MCV 86.5   MCH  25.5*   RDW 61.2*   PLATELETCT 261   MPV 11.2   NEUTSPOLYS 74.80*   LYMPHOCYTES 17.50*   MONOCYTES 6.10   EOSINOPHILS 1.10   BASOPHILS 0.20   RBCMORPHOLO Present         Recent Labs     03/22/22 2208   SODIUM 140   POTASSIUM 3.6   CHLORIDE 102   CO2 27   BUN 8   CREATININE 0.75   CALCIUM 9.4   ALBUMIN 3.9       CULTURES:   Results     Procedure Component Value Units Date/Time    Blood Culture,Hold [[id number]] Collected: 03/22/22 2300    Order Status: Completed Updated: 03/23/22 0129     Blood Culture Hold Collected          IMAGES:  DX-CHEST-PORTABLE (1 VIEW)   Final Result      Stable cardiomegaly.           CONSULTS:   None    ASSESSMENT/PLAN:   46 y.o. female admitted for    #Fluid overload  #Difficulty ambulating  #Elevated in estimated RVSP likely class III pulmonary hypertension  46-year-old female with increasing lower extremity edema and difficulty walking.  Her lungs are clear to auscultation bilaterally.  Previous echo 2/27/2022 showed borderline elevated RVSP of 35 mmHg and concentric LVH.  On admission her proBNP is slightly elevated.  She has significant edema in the bilateral lower extremities.    Plan:  -Continue Lasix  -Monitor ins and outs  -Daily weights  -PT evaluation    #Lower extremity erythema  Patient with bilateral lower extremity erythema consistent with chronic venous stasis.  No tenderness on exam.  Do not suspect cellulitis at this time.  Patient was given 1 dose of Ancef in the emergency department.    Plan:  -Hold off on antibiotics for now  -Will defer to the day team    #COPD  Lungs are clear to auscultation.  Does not be appear to be in acute exacerbation.    Plan:  -Continue  albuterol nebulized versus MDI every 4 hours as needed  Symbicort 2 puffs 2 times daily  Singulair 10 mg p.o. daily    #Atrial fibrillation on anticoagulation with warfarin  -Continue warfarin 6 mg p.o. every evening    #Schizophrenia  Patient uncooperative with exam.  Possibly exhibiting negative signs of  schizophrenia versus sedation from medication.    Plan:  -Continue aripiprazole 30 mg daily  -Hold sedating medications including gabapentin and trazodone    #Neuropathy  Hold gabapentin given sedation and monitor mentation.      Core Measures:  Lines: Peripheral IV  Tubes: None  Diet: Regular  Abx: Holding  DVT Ppx: Not indicated patient therapeutically anticoagulated  GI Ppx: Not indicated  PCP: Mallika Sharp M.D.   CODE STATUS: Unable to discuss CODE STATUS.  Previous CODE STATUS was full code.  Advanced directive on file.    Dispo: Inpatient for diuresis and physical therapy evaluation        Bud Han M.D. (PGY-3)

## 2022-03-23 NOTE — ED TRIAGE NOTES
"  Chief Complaint   Patient presents with   • Leg Swelling     Pt BIB EMS to triage for above. Pt reports bilateral leg swelling and pain x 3 days. States her MD took her off of her Lasix one year ago and replaced it with another medication that she cannot remember the name of. Denies CP or SOB. Bilateral LE edema noted in triage. HC CHF. EKG in triage.      Pt is alert/oriented, following commands, and answering questions appropriately. No acute distress noted in triage and respirations are even and unlabored.   Pt placed in lobby and educated on triage process. Pt encouraged to alert staff for any changes in condition.    ./85   Pulse 80   Temp 36.3 °C (97.4 °F) (Temporal)   Resp 20   Ht 1.6 m (5' 3\")   Wt (!) 138 kg (304 lb 10.8 oz)   LMP  (LMP Unknown)   SpO2 94%   BMI 53.97 kg/m²     "

## 2022-03-23 NOTE — PROGRESS NOTES
Inpatient Anticoagulation Service Note    Date: 3/23/2022    Reason for Anticoagulation: Atrial Fibrillation   Target INR: 2.0 to 3.0  [id number] VASc Score: 2  HAS-BLED Score: 0   Hemoglobin Value: (!) 11.9  Hematocrit Value: 40.4  Lab Platelet Value: 261    INR from last 7 days     Date/Time INR Value    03/22/22 2209 2.11        Dose from last 7 days     Date/Time Dose (mg)    03/23/22 1123 6        Significant Interactions: Statin  Bridge Therapy: No   Reversal Agent Administered: Not Applicable    Plan: INR therapeutic continue home warfarin       Education Material Provided?: No    Pharmacist suggested discharge dosing: Continue home warfarin 6 mg once daily. Consider follow up INR in 48 to 72 hours after patient discharges from the hospital.       Cecelia Mccall, PharmD

## 2022-03-23 NOTE — PROGRESS NOTES
"Pt called and stated she needs to have a BM; RN and tech attempted to assist pt to pivot to commode with her walker. Pt became agitated, began yelling and stated \"I cannot stand with the swelling to my legs, I am a fall risk, I will fall on the floor and then I will ken all of you\", as she was attempting to roll off the gurney. RN and tech safely repositioned patient back on the gurney. Pt placed on bedside commode.  "

## 2022-03-23 NOTE — ED PROVIDER NOTES
ED Provider Note    CHIEF COMPLAINT  Chief Complaint   Patient presents with   • Leg Swelling     Pt BIB EMS to triage for above. Pt reports bilateral leg swelling and pain x 3 days. States her MD took her off of her Lasix one year ago and replaced it with another medication that she cannot remember the name of. Denies CP or SOB. Bilateral LE edema noted in triage. HC CHF. EKG in triage.        HPI  Lorene Haro is a 46 y.o. female with history of atrial fibrillation on warfarin, KATHIA, asthma/COPD, schizophrenia who presents to the emergency department with lower extremity swelling.  Patient states that she woke up 3 days ago with acute onset of the symptoms.  She reports pain and swelling to bilateral extremities that is worse in the left than the right.  She states that she used to be on a diuretic medication however they took her off of it approximately a year ago.  She is taking all of her other medications as prescribed.  No fevers, chest pain or significant shortness of breath.  She is on 3 L of oxygen at baseline.    REVIEW OF SYSTEMS  See HPI for further details.   Review of Systems   Constitutional: Negative for chills and fever.   HENT: Negative for sore throat.    Eyes: Negative for blurred vision and redness.   Respiratory: Negative for cough and shortness of breath.    Cardiovascular: Negative for chest pain and leg swelling.   Gastrointestinal: Negative for abdominal pain and vomiting.   Genitourinary: Negative for dysuria and urgency.   Musculoskeletal: Negative for back pain and neck pain.   Skin: Negative for rash.   Neurological: Negative for focal weakness, seizures and headaches.   Psychiatric/Behavioral: Negative for suicidal ideas.         PAST MEDICAL HISTORY   has a past medical history of Asthma, Bipolar 2 disorder ([facility]), Chickenpox, Hypertension, On home oxygen therapy, Other psychological stress, Schizophrenic disorder ([facility]), and Yeast infection of the skin (4/1/2021).    SOCIAL  "HISTORY  Social History     Tobacco Use   • Smoking status: Former Smoker     Years: 20.00     Types: Cigarettes   • Smokeless tobacco: Never Used   • Tobacco comment: \"Quit two days ago\"   Vaping Use   • Vaping Use: Never used   Substance and Sexual Activity   • Alcohol use: No   • Drug use: No   • Sexual activity: Not Currently       SURGICAL HISTORY   has a past surgical history that includes hysterectomy laparoscopy; colectomy; cholecystectomy; and knee arthroscopy (Left).    CURRENT MEDICATIONS  Home Medications     Reviewed by Noy Lea R.N. (Registered Nurse) on 03/22/22 at 2117  Med List Status: <None>   Medication Last Dose Status   acetaminophen (TYLENOL) 325 MG Tab  Active   acetaminophen (TYLENOL) 500 MG Tab  Active   albuterol (PROVENTIL) 2.5mg/3ml Nebu Soln solution for nebulization  Active   albuterol 108 (90 Base) MCG/ACT Aero Soln inhalation aerosol  Active   aripiprazole (ABILIFY) 30 MG tablet  Active   atorvastatin (LIPITOR) 20 MG Tab  Active   budesonide-formoterol (SYMBICORT) 160-4.5 MCG/ACT Aerosol  Active   famotidine (PEPCID) 40 MG Tab  Active   fluticasone (FLONASE) 50 MCG/ACT nasal spray  Active   gabapentin (NEURONTIN) 300 MG Cap  Active   lidocaine (LIDODERM) 5 % Patch  Active   montelukast (SINGULAIR) 10 MG Tab  Active   nitroglycerin (NITROSTAT) 0.4 MG SL Tab  Active   tiotropium (SPIRIVA HANDIHALER) 18 MCG Cap  Active   traZODone (DESYREL) 50 MG Tab  Active   warfarin (COUMADIN) 6 MG Tab  Active                ALLERGIES  Allergies   Allergen Reactions   • Penicillin G Rash and Itching     pt reports that she gets a rash all over her body and gets itchy   • Amoxicillin Rash and Itching     Tolerates cephalosporins, pt reports that she gets a rash all over her body and gets itchy       PHYSICAL EXAM   VITAL SIGNS: /68   Pulse 85   Temp 36.6 °C (97.8 °F) (Temporal)   Resp 20   Ht 1.6 m (5' 3\")   Wt (!) 138 kg (304 lb 10.8 oz)   LMP  (LMP Unknown)   SpO2 98%   BMI " 53.97 kg/m²      Physical Exam  Constitutional:       General: She is not in acute distress.     Comments: Chronically ill-appearing obese female   HENT:      Head: Normocephalic and atraumatic.   Eyes:      Conjunctiva/sclera: Conjunctivae normal.      Pupils: Pupils are equal, round, and reactive to light.   Cardiovascular:      Rate and Rhythm: Normal rate and regular rhythm.      Heart sounds: Normal heart sounds.      Comments: Pulses intact in lower extremities  Pulmonary:      Effort: Pulmonary effort is normal. No respiratory distress.      Breath sounds: Normal breath sounds.      Comments: Diminished breath sounds at the bases, no significant wheeze  Abdominal:      General: There is no distension.      Palpations: Abdomen is soft.      Tenderness: There is no abdominal tenderness.   Musculoskeletal:         General: No tenderness. Normal range of motion.      Cervical back: Normal range of motion and neck supple.      Right lower leg: Edema present.      Left lower leg: Edema present.      Comments: Significant bilateral pitting edema with overlying warmth and erythema to both shins   Skin:     General: Skin is warm and dry.   Neurological:      Mental Status: She is alert and oriented to person, place, and time.      Comments: Moving all extremities spontaneously   Psychiatric:         Mood and Affect: Affect is flat.           DIAGNOSTIC STUDIES    EKG  Results for orders placed or performed during the hospital encounter of 22   EKG   Result Value Ref Range    Report       [facility] Emergency Dept.    Test Date:  2022  Pt Name:    ADELINA MILLAN                Department: ER  MRN:        [id number]                      Room:  Gender:     Female                       Technician: [id number]  :        1975                   Requested By:ER TRIAGE PROTOCOL  Order #:    [id number]                    Reading MD: Natalia South MD    Measurements  Intervals                                 Axis  Rate:       82                           P:          0  MN:         188                          QRS:        60  QRSD:       110                          T:          11  QT:         460  QTc:        538    Interpretive Statements  SINUS RHYTHM  NONSPECIFIC INTRAVENTRICULAR CONDUCTION DELAY  ARTIFACT in all leads  No ST or T wave change  Compared to ECG 02/26/2022 18:49:05  No significant change from prior  Electronically Signed On 3- 22:30:16 PDT by Natalia South MD             LABS  Personally reviewed by me  Labs Reviewed   CBC WITH DIFFERENTIAL - Abnormal; Notable for the following components:       Result Value    Hemoglobin 11.9 (*)     MCH 25.5 (*)     MCHC 29.5 (*)     RDW 61.2 (*)     Neutrophils-Polys 74.80 (*)     Lymphocytes 17.50 (*)     Neutrophils (Absolute) 7.72 (*)     All other components within normal limits   COMP METABOLIC PANEL - Abnormal; Notable for the following components:    Alkaline Phosphatase 137 (*)     All other components within normal limits   TROPONIN - Abnormal; Notable for the following components:    Troponin T 31 (*)     All other components within normal limits   PROBRAIN NATRIURETIC PEPTIDE, NT - Abnormal; Notable for the following components:    NT-proBNP 552 (*)     All other components within normal limits   PROTHROMBIN TIME - Abnormal; Notable for the following components:    PT 23.0 (*)     INR 2.11 (*)     All other components within normal limits    Narrative:     Indicate which anticoagulants the patient is on:->UNKNOWN   APTT - Abnormal; Notable for the following components:    APTT 36.7 (*)     All other components within normal limits    Narrative:     Indicate which anticoagulants the patient is on:->UNKNOWN   CRP QUANTITIVE (NON-CARDIAC) - Abnormal; Notable for the following components:    Stat C-Reactive Protein 1.98 (*)     All other components within normal limits   TROPONIN - Abnormal; Notable for the following components:    Troponin T  28 (*)     All other components within normal limits   PERIPHERAL SMEAR REVIEW   PLATELET ESTIMATE   MORPHOLOGY   DIFFERENTIAL COMMENT   ESTIMATED GFR   PROCALCITONIN   BLOOD CULTURE,HOLD           RADIOLOGY  Personally reviewed by me  DX-CHEST-PORTABLE (1 VIEW)   Final Result      Stable cardiomegaly.        ED COURSE  Vitals:    03/23/22 0001 03/23/22 0131 03/23/22 0301 03/23/22 0441   BP: 141/70 136/70 129/65 144/68   Pulse: 84 88 78 85   Resp:       Temp:       TempSrc:       SpO2: 99% 100% 100% 98%   Weight:       Height:             Medications administered:  Medications   ceFAZolin (Ancef) IV syringe 2,000 mg (has no administration in time range)   furosemide (LASIX) injection 20 mg (20 mg Intravenous Given 3/22/22 0608)         Old records personally reviewed:  Recent admission end of Feb for chest pain.  Negative stress test, echo as below.  CONCLUSIONS  Compared to the prior echo on 03/20/21, left ventricular systolic   function is now hyperdynamic with Doppler study showing mild aortic   stenosis.  The left ventricle is mildly dilated.  Mild concentric left ventricular hypertrophy.  Hyperdynamic left ventricular systolic function with estimated ejection   fraction 70%.        MEDICAL DECISION MAKING  Patient with history of multiple medical comorbidities who presents with reportedly acute lower extremity swelling.  She is afebrile with reassuring vital signs on arrival.  She has no hypoxia on her baseline 3 L oxygen requirement.  EKG is unchanged from prior without acute ischemia or arrhythmia.  Troponin is minimally elevated however baseline for the patient.  BNP is slightly elevated however has also been at this level in the past.  She has no leukocytosis or significant elevation of inflammatory markers that are suggestive of sepsis.  Clinically doubt DVT as the patient is on warfarin with a therapeutic INR.    After some chart review, the patient does appear to have a diagnosis of pulmonary  hypertension however was taken off of Lasix about a year ago.  I do feel that she would likely benefit from restarting this as she does appear to be volume overloaded.  Initial plan to restart medication with close outpatient follow-up.    4:30 AM - Attempted to road test the patient and she is unable to pick her legs up off the bed to walk.  She says she feels very weak and cannot ambulate at this time.  We will plan for admission for IV diuresis and further monitoring.    5:00 AM -I discussed the case with [facility] family resident on-call who accepts admission of the patient.  Patient was updated on plan of care for admission and agreeable at this time.  Admit medicine, stable condition        IMPRESSION  (R60.9) Peripheral edema  (L03.119) Cellulitis of lower extremity, unspecified laterality    Disposition: Admit medicine, stable condition  Results, diagnoses, and treatment options were discussed with the patient and/or family. Patient verbalized understanding of plan of care.    Patient referred to primary care provider for monitoring and treatment of blood pressure.            Electronically signed by: Natalia South M.D., 3/22/2022 10:09 PM

## 2022-03-23 NOTE — ED NOTES
Bilateral lower extremity edema swelling with redness on calves pt states its hard for her to walk or move around too much. Wants to sleep and lay flat call light within reach [facility]

## 2022-03-23 NOTE — ED NOTES
Attempted interview for medication history  Patient unable to participate in interview at this time

## 2022-03-23 NOTE — ED NOTES
Md aware pt not very cooperative and not responsive to RN directions and questions. Pt states she just wants to sleep and does not want to be bothered. Call light wtihin reach bed low and locked

## 2022-03-24 ENCOUNTER — TELEPHONE (OUTPATIENT)
Dept: VASCULAR LAB | Facility: MEDICAL CENTER | Age: 47
End: 2022-03-24
Payer: MEDICAID

## 2022-03-24 LAB
ANION GAP SERPL CALC-SCNC: 7 MMOL/L (ref 7–16)
BASOPHILS # BLD AUTO: 0.3 % (ref 0–1.8)
BASOPHILS # BLD: 0.02 K/UL (ref 0–0.12)
BUN SERPL-MCNC: 6 MG/DL (ref 8–22)
CALCIUM SERPL-MCNC: 8.9 MG/DL (ref 8.5–10.5)
CHLORIDE SERPL-SCNC: 103 MMOL/L (ref 96–112)
CO2 SERPL-SCNC: 35 MMOL/L (ref 20–33)
CREAT SERPL-MCNC: 0.82 MG/DL (ref 0.5–1.4)
EOSINOPHIL # BLD AUTO: 0.07 K/UL (ref 0–0.51)
EOSINOPHIL NFR BLD: 0.9 % (ref 0–6.9)
ERYTHROCYTE [DISTWIDTH] IN BLOOD BY AUTOMATED COUNT: 61.7 FL (ref 35.9–50)
GFR SERPLBLD CREATININE-BSD FMLA CKD-EPI: 88 ML/MIN/1.73 M 2
GLUCOSE SERPL-MCNC: 108 MG/DL (ref 65–99)
HCT VFR BLD AUTO: 38.1 % (ref 37–47)
HGB BLD-MCNC: 10.8 G/DL (ref 12–16)
IMM GRANULOCYTES # BLD AUTO: 0.03 K/UL (ref 0–0.11)
IMM GRANULOCYTES NFR BLD AUTO: 0.4 % (ref 0–0.9)
INR PPP: 2.25 (ref 0.87–1.13)
LYMPHOCYTES # BLD AUTO: 1.34 K/UL (ref 1–4.8)
LYMPHOCYTES NFR BLD: 17.5 % (ref 22–41)
MCH RBC QN AUTO: 25.1 PG (ref 27–33)
MCHC RBC AUTO-ENTMCNC: 28.3 G/DL (ref 33.6–35)
MCV RBC AUTO: 88.6 FL (ref 81.4–97.8)
MONOCYTES # BLD AUTO: 0.6 K/UL (ref 0–0.85)
MONOCYTES NFR BLD AUTO: 7.9 % (ref 0–13.4)
NEUTROPHILS # BLD AUTO: 5.58 K/UL (ref 2–7.15)
NEUTROPHILS NFR BLD: 73 % (ref 44–72)
NRBC # BLD AUTO: 0 K/UL
NRBC BLD-RTO: 0 /100 WBC
PLATELET # BLD AUTO: 208 K/UL (ref 164–446)
PMV BLD AUTO: 10.2 FL (ref 9–12.9)
POTASSIUM SERPL-SCNC: 3.6 MMOL/L (ref 3.6–5.5)
PROTHROMBIN TIME: 24.2 SEC (ref 12–14.6)
RBC # BLD AUTO: 4.3 M/UL (ref 4.2–5.4)
SODIUM SERPL-SCNC: 145 MMOL/L (ref 135–145)
WBC # BLD AUTO: 7.6 K/UL (ref 4.8–10.8)

## 2022-03-24 PROCEDURE — A9270 NON-COVERED ITEM OR SERVICE: HCPCS | Performed by: STUDENT IN AN ORGANIZED HEALTH CARE EDUCATION/TRAINING PROGRAM

## 2022-03-24 PROCEDURE — 80048 BASIC METABOLIC PNL TOTAL CA: CPT

## 2022-03-24 PROCEDURE — 700102 HCHG RX REV CODE 250 W/ 637 OVERRIDE(OP): Performed by: FAMILY MEDICINE

## 2022-03-24 PROCEDURE — 700111 HCHG RX REV CODE 636 W/ 250 OVERRIDE (IP): Performed by: STUDENT IN AN ORGANIZED HEALTH CARE EDUCATION/TRAINING PROGRAM

## 2022-03-24 PROCEDURE — 700102 HCHG RX REV CODE 250 W/ 637 OVERRIDE(OP): Performed by: STUDENT IN AN ORGANIZED HEALTH CARE EDUCATION/TRAINING PROGRAM

## 2022-03-24 PROCEDURE — 85610 PROTHROMBIN TIME: CPT

## 2022-03-24 PROCEDURE — 99232 SBSQ HOSP IP/OBS MODERATE 35: CPT | Mod: GC | Performed by: FAMILY MEDICINE

## 2022-03-24 PROCEDURE — 85025 COMPLETE CBC W/AUTO DIFF WBC: CPT

## 2022-03-24 PROCEDURE — 770006 HCHG ROOM/CARE - MED/SURG/GYN SEMI*

## 2022-03-24 PROCEDURE — 97162 PT EVAL MOD COMPLEX 30 MIN: CPT

## 2022-03-24 PROCEDURE — A9270 NON-COVERED ITEM OR SERVICE: HCPCS | Performed by: FAMILY MEDICINE

## 2022-03-24 PROCEDURE — 36415 COLL VENOUS BLD VENIPUNCTURE: CPT

## 2022-03-24 RX ORDER — FUROSEMIDE 10 MG/ML
40 INJECTION INTRAMUSCULAR; INTRAVENOUS ONCE
Status: COMPLETED | OUTPATIENT
Start: 2022-03-24 | End: 2022-03-24

## 2022-03-24 RX ORDER — ACETAMINOPHEN 500 MG
1000 TABLET ORAL 3 TIMES DAILY PRN
Status: DISCONTINUED | OUTPATIENT
Start: 2022-03-24 | End: 2022-03-30 | Stop reason: HOSPADM

## 2022-03-24 RX ORDER — FUROSEMIDE 10 MG/ML
80 INJECTION INTRAMUSCULAR; INTRAVENOUS
Status: DISCONTINUED | OUTPATIENT
Start: 2022-03-24 | End: 2022-03-24

## 2022-03-24 RX ORDER — POTASSIUM CHLORIDE 20 MEQ/1
40 TABLET, EXTENDED RELEASE ORAL DAILY
Status: DISCONTINUED | OUTPATIENT
Start: 2022-03-24 | End: 2022-03-30 | Stop reason: HOSPADM

## 2022-03-24 RX ADMIN — BUDESONIDE AND FORMOTEROL FUMARATE DIHYDRATE 2 PUFF: 160; 4.5 AEROSOL RESPIRATORY (INHALATION) at 06:33

## 2022-03-24 RX ADMIN — FUROSEMIDE 40 MG: 10 INJECTION, SOLUTION INTRAMUSCULAR; INTRAVENOUS at 06:18

## 2022-03-24 RX ADMIN — ATORVASTATIN CALCIUM 20 MG: 20 TABLET, FILM COATED ORAL at 20:16

## 2022-03-24 RX ADMIN — POTASSIUM CHLORIDE 40 MEQ: 1500 TABLET, EXTENDED RELEASE ORAL at 09:16

## 2022-03-24 RX ADMIN — WARFARIN SODIUM 6 MG: 6 TABLET ORAL at 17:04

## 2022-03-24 RX ADMIN — BUDESONIDE AND FORMOTEROL FUMARATE DIHYDRATE 2 PUFF: 160; 4.5 AEROSOL RESPIRATORY (INHALATION) at 17:07

## 2022-03-24 RX ADMIN — MONTELUKAST 10 MG: 10 TABLET, FILM COATED ORAL at 20:16

## 2022-03-24 RX ADMIN — ACETAMINOPHEN 1000 MG: 500 TABLET ORAL at 11:55

## 2022-03-24 RX ADMIN — SPIRONOLACTONE 25 MG: 25 TABLET, FILM COATED ORAL at 06:20

## 2022-03-24 RX ADMIN — FUROSEMIDE 40 MG: 10 INJECTION, SOLUTION INTRAMUSCULAR; INTRAVENOUS at 09:16

## 2022-03-24 RX ADMIN — FUROSEMIDE 40 MG: 10 INJECTION, SOLUTION INTRAMUSCULAR; INTRAVENOUS at 17:04

## 2022-03-24 RX ADMIN — ACETAMINOPHEN 1000 MG: 500 TABLET ORAL at 20:22

## 2022-03-24 RX ADMIN — SENNOSIDES AND DOCUSATE SODIUM 2 TABLET: 50; 8.6 TABLET ORAL at 06:20

## 2022-03-24 RX ADMIN — ARIPIPRAZOLE 30 MG: 15 TABLET ORAL at 17:05

## 2022-03-24 RX ADMIN — FAMOTIDINE 40 MG: 20 TABLET ORAL at 06:21

## 2022-03-24 ASSESSMENT — GAIT ASSESSMENTS
DEVIATION: INCREASED BASE OF SUPPORT;DECREASED HEEL STRIKE;DECREASED TOE OFF;BRADYKINETIC
ASSISTIVE DEVICE: 4 WHEEL WALKER
GAIT LEVEL OF ASSIST: CONTACT GUARD ASSIST
DISTANCE (FEET): 6

## 2022-03-24 ASSESSMENT — COGNITIVE AND FUNCTIONAL STATUS - GENERAL
STANDING UP FROM CHAIR USING ARMS: A LITTLE
WALKING IN HOSPITAL ROOM: A LITTLE
TURNING FROM BACK TO SIDE WHILE IN FLAT BAD: UNABLE
SUGGESTED CMS G CODE MODIFIER MOBILITY: CL
MOBILITY SCORE: 11
CLIMB 3 TO 5 STEPS WITH RAILING: A LOT
MOVING TO AND FROM BED TO CHAIR: UNABLE
MOVING FROM LYING ON BACK TO SITTING ON SIDE OF FLAT BED: UNABLE

## 2022-03-24 ASSESSMENT — PAIN DESCRIPTION - PAIN TYPE
TYPE: CHRONIC PAIN
TYPE: CHRONIC PAIN

## 2022-03-24 NOTE — DISCHARGE PLANNING
Anticipated Discharge Disposition: PT recommends post-acute placement vs HH     Action: pt currently on 4 liters O2, baseline home O2 is 3L/nc, 6 clicks are _/11. Orientation: A&Ox4. Assessment complete. CM attempted to speak with patient but was unavailable. This CM spoke with patient's health care agent/mother by phone to gain information. Patient's mother lives in same apartment complex but does not provide supervision or care. Patient receives life skill teaching from [employer] named [employer] with worker Bobo who goes to home daily Mon-Fri for 4-5 hours per day. Bobo provides teaching to patient on cooking, bathing, cleaning, coping skills, etc. I spoke with Bobo by phone at [phone], who states patient is unmotivated to participate in most self care activities at home.      Barriers to Discharge: Medical readiness, lack of funding     Plan: f/u with pt and medical team to discuss dc needs and barriers. [employer] will possibly be able to accept patient with [place] Medicaid. Call placed to inquire at [phone], owner name Daisy Mckeon. Message left for return call.

## 2022-03-24 NOTE — THERAPY
"Physical Therapy   Initial Evaluation     Patient Name: Lorene Haro  Age:  46 y.o., Sex:  female  Medical Record #: [id number]  Today's Date: 3/24/2022          Assessment  Patient is 46 y.o. female with admitting diagnosis of fluid excess, weakness, chest and flank pain, nausea. PMHx includes  severe asthma, obesity, chronic respiratory failure, A fib, hyperlipidemia, COPD, bipolar, schizophrenia, on 3 L NC at baseline.   Pt is agreeable to participation in PT evaluation, limited primarily by pain in L LE during standing and ambulation. Pain continues while pt is seated in chair, requests back to bed due to increased LE pain from knee to foot while in dependent positioning. With mild improvements in functional mobility pt will be appropriate for DC home with HH, however at this time pt is most appropriate for DC to post acute facility for further functional mobility/strength training prior to DC home. Pt will benefit from continued PT services in acute setting, as well as in post acute setting.         Plan    Recommend Physical Therapy 3 times per week until therapy goals are met for the following treatments:  Bed Mobility, Neuro Re-Education / Balance, Therapeutic Activities, and Therapeutic Exercises    DC Equipment Recommendations: (P) None  Discharge Recommendations: (P) Recommend post-acute placement for additional physical therapy services prior to discharge home       Subjective    \"Why am I here? I don't understand\"     03/24/22 1223   Initial Contact Note    Initial Contact Note Order Received and Verified, Physical Therapy Evaluation in Progress with Full Report to Follow.   Vitals   O2 (LPM) 3   O2 Delivery Device Nasal Cannula   Pain 0 - 10 Group   Location Leg   Location Orientation Left   Pain Rating Scale (NPRS) 6   Therapist Pain Assessment During Activity  (and in dependent position)   Prior Living Situation   Prior Services Intermittent Physical Support for ADL Per Service   Housing / Facility " "2 Story Apartment / Condo   Steps Into Home 15   Steps In Home 0   Elevator Yes   Equipment Owned 4-Wheel Walker;Tub / Shower Seat;Grab Bar(s) In Tub / Shower   Lives with - Patient's Self Care Capacity Alone and Unable to Care For Self   Comments pt reports she had service providing assistance for ADLs   Prior Level of Functional Mobility   Bed Mobility Independent   Transfer Status Independent   Ambulation Independent   Assistive Devices Used 4-Wheel Walker   Wheelchair Independent   History of Falls   History of Falls No   Cognition    Cognition / Consciousness X   Orientation Level Not Oriented to Reason   Level of Consciousness Alert   New Learning Impaired   Attention Impaired   Comments pt with mild confusion, repeats \"why am I here?'   Strength Lower Body   Lower Body Strength  X   Gross Strength Generalized Weakness, Equal Bilaterally   Comments adequate for sit to stand and ambulation short distances with 4ww   Balance Assessment   Sitting Balance (Static) Fair +   Sitting Balance (Dynamic) Fair   Standing Balance (Static) Fair   Standing Balance (Dynamic) Fair -   Weight Shift Sitting Fair   Weight Shift Standing Poor   Comments increased pain in standing/weight bearing in L LE   Gait Analysis   Gait Level Of Assist Contact Guard Assist   Assistive Device 4 Wheel Walker   Distance (Feet) 6   # of Times Distance was Traveled 2   Deviation Increased Base Of Support;Decreased Heel Strike;Decreased Toe Off;Bradykinetic   Bed Mobility    Supine to Sit Minimal Assist   Sit to Supine Moderate Assist   Functional Mobility   Sit to Stand Contact Guard Assist   Bed, Chair, Wheelchair Transfer Minimal Assist   Mobility bed mobility, sit to stand from bed, chair, short ambulation in room   How much difficulty does the patient currently have...   Turning over in bed (including adjusting bedclothes, sheets and blankets)? 1   Sitting down on and standing up from a chair with arms (e.g., wheelchair, bedside commode, " etc.) 1   Moving from lying on back to sitting on the side of the bed? 1   How much help from another person does the patient currently need...   Moving to and from a bed to a chair (including a wheelchair)? 3   Need to walk in a hospital room? 3   Climbing 3-5 steps with a railing? 2   6 clicks Mobility Score 11   Activity Tolerance   Sitting in Chair 5 min   Sitting Edge of Bed 5 min   Standing 3 min   Short Term Goals    Short Term Goal # 1 supine to/from sit EOB supervision without use of rails, HOB flat, in 6 visits   Short Term Goal # 2 sit to stand from chair, EOB and toilet with supervision in 6 visits   Short Term Goal # 3 ambulation with 4ww 100 feet SBA in 6 visits   Education Group   Education Provided Role of Physical Therapist;Gait Training   Role of Physical Therapist Patient Response Patient;Acceptance;Explanation;Verbal Demonstration   Gait Training Patient Response Patient;Acceptance;Explanation;Verbal Demonstration;Action Demonstration   Problem List    Problems Pain;Impaired Bed Mobility;Impaired Transfers;Impaired Ambulation;Impaired Balance;Functional Strength Deficit;Decreased Activity Tolerance   Anticipated Discharge Equipment and Recommendations   DC Equipment Recommendations None   Discharge Recommendations Recommend post-acute placement for additional physical therapy services prior to discharge home     Lazara Reece DPT

## 2022-03-24 NOTE — DIETARY
NUTRITION SERVICES: BMI - Pt with BMI >40 (=Body mass index is 53.97 kg/m².), Class III obesity. Weight loss counseling not appropriate in acute care setting. Of note, patient with fluids accumulation and edema noted which would also be increasing weight and BMI.    RECOMMEND - If appropriate at DC please refer to outpatient nutrition services for weight management.

## 2022-03-24 NOTE — CARE PLAN
The patient is Stable - Low risk of patient condition declining or worsening    Shift Goals  Clinical Goals: patient will be free from falls    Progress made toward(s) clinical / shift goals:  fall precautions in place, bed locked and in lowest position.     Patient is not progressing towards the following goals:

## 2022-03-24 NOTE — PROGRESS NOTES
4 Eyes Skin Assessment Completed by JC Enriquez and JC Claudio.    Head WDL  Ears Redness and Blanching  Nose WDL  Mouth WDL  Neck WDL  Breast/Chest WDL; moist   Shoulder Blades WDL  Spine WDL  (R) Arm/Elbow/Hand Redness and Blanching  (L) Arm/Elbow/Hand Redness and Blanching  Abdomen WDL  Groin Redness and Blanching, moisture  Scrotum/Coccyx/Buttocks Redness and Blanching, moisture  (R) Leg WDL  (L) Leg WDL  (R) Heel/Foot/Toe: redness, blanching  (L) Heel/Foot/Toe: redness, blanching      Devices In Places: N/A      Interventions In Place Pillows and Pressure Redistribution Mattress    Possible Skin Injury No    Pictures Uploaded Into Epic N/A  Wound Consult Placed N/A  RN Wound Prevention Protocol Ordered No

## 2022-03-24 NOTE — PROGRESS NOTES
Received patient on bed, very sleepy, unable to stay awake long enough to answer questions.  VSS stable, oxygenation upper 90% on 4L nasal cannula. Patient more awake this AM, alert to self and place, disoriented to situation and time. Fall precautions in place, bed locked and in lowest position. Needs attended to.

## 2022-03-24 NOTE — PROGRESS NOTES
Inpatient Anticoagulation Service Note    Date: 3/24/2022    Reason for Anticoagulation: Atrial Fibrillation   Target INR: 2.0 to 3.0  [id number] VASc Score: 2  HAS-BLED Score: 0   Hemoglobin Value: (!) 10.8  Hematocrit Value: 38.1  Lab Platelet Value: 208    INR from last 7 days     Date/Time INR Value    03/24/22 00:35:01 2.25    03/22/22 2209 2.11        Dose from last 7 days     Date/Time Dose (mg)    03/24/22 1322 6    03/23/22 1123 6        Significant Interactions: Statin  Bridge Therapy: No   Reversal Agent Administered: Not Applicable  Comments: Continue with home regimen  Education Material Provided?: No  Pharmacist suggested discharge dosing: home regimen of 6 mg daily     Chantale Hinson, HildaD  [id number]

## 2022-03-24 NOTE — PROGRESS NOTES
Assumed care of pt and received bedside report. Pt is A&O x 4, denies pain, bedside table and personal items within reach, bed locked in low position, built in bed alarm on, no additional needs at this time.

## 2022-03-24 NOTE — PROGRESS NOTES
[facility] [place] FAMILY MEDICINE PROGRESS NOTE     Attending:   Zoe Carbajal MD    Resident:   Edison Mclaughlin DO    PATIENT:   Lorene Haro; [id number]; 1975    ID:   46 y.o. female with a history of KATHIA, COPD (3L baseline), Afib (warfarin) admitted for worsening lower extremity swelling     SUBJECTIVE:   No acute events overnight, patient states her left leg hurts this am. Not ambulating today.     OBJECTIVE:  Vitals:    03/23/22 1953 03/24/22 0357 03/24/22 0614 03/24/22 0718   BP: 114/51 138/58 128/60 106/50   Pulse: 75 82 83 80   Resp: 17 20 18 18   Temp: 36.1 °C (97 °F) 37 °C (98.6 °F) 36.6 °C (97.8 °F) 36.8 °C (98.2 °F)   TempSrc: Temporal Temporal Temporal Temporal   SpO2: 99% 98% 95% 98%   Weight:       Height:         No intake or output data in the 24 hours ending 03/24/22 0738    PHYSICAL EXAM:  General: No acute distress, deconditioned  HEENT: NC/AT. EOMI.   Cardiovascular: RRR without murmurs. Normal capillary refill   Respiratory: Diminished breath sounds on anterior auscultation   Abdomen: soft, nontender, nondistended, no masses  EXT:  PERKINS, bilateral erythema shins appears improved compared to yesterday. Bilateral pitting edema   Skin: See EXT exam  Neuro: Non-focal    LABS:  Recent Labs     03/22/22 2208 03/24/22  0035   WBC 10.3 7.6   RBC 4.67 4.30   HEMOGLOBIN 11.9* 10.8*   HEMATOCRIT 40.4 38.1   MCV 86.5 88.6   MCH 25.5* 25.1*   RDW 61.2* 61.7*   PLATELETCT 261 208   MPV 11.2 10.2   NEUTSPOLYS 74.80* 73.00*   LYMPHOCYTES 17.50* 17.50*   MONOCYTES 6.10 7.90   EOSINOPHILS 1.10 0.90   BASOPHILS 0.20 0.30   RBCMORPHOLO Present  --      Recent Labs     03/22/22 2208 03/24/22  0035   SODIUM 140 145   POTASSIUM 3.6 3.6   CHLORIDE 102 103   CO2 27 35*   BUN 8 6*   CREATININE 0.75 0.82   CALCIUM 9.4 8.9   ALBUMIN 3.9  --      Estimated GFR/CRCL = Estimated Creatinine Clearance: 114.7 mL/min (by C-G formula based on SCr of 0.82 mg/dL).  Recent Labs     03/22/22  2208 03/24/22  0035   GLUCOSE 98 108*     Recent Labs      03/22/22 2208 03/22/22 2209 03/24/22 0035   ASTSGOT 14  --   --    ALTSGPT 11  --   --    TBILIRUBIN 0.5  --   --    ALKPHOSPHAT 137*  --   --    GLOBULIN 3.4  --   --    INR  --  2.11* 2.25*             Recent Labs     03/22/22 2209 03/24/22 0035   INR 2.11* 2.25*   APTT 36.7*  --          IMAGING:  DX-CHEST-PORTABLE (1 VIEW)   Final Result      Stable cardiomegaly.          MEDS:  Current Facility-Administered Medications   Medication Last Admin   • furosemide (LASIX) injection 80 mg     • furosemide (LASIX) injection 40 mg     • potassium chloride SA (Kdur) tablet 40 mEq     • senna-docusate (PERICOLACE or SENOKOT S) 8.6-50 MG per tablet 2 Tablet 2 Tablet at 03/24/22 0620    And   • polyethylene glycol/lytes (MIRALAX) PACKET 1 Packet      And   • magnesium hydroxide (MILK OF MAGNESIA) suspension 30 mL      And   • bisacodyl (DULCOLAX) suppository 10 mg     • albuterol (PROVENTIL) 2.5mg/0.5ml nebulizer solution 2.5 mg     • ARIPiprazole (Abilify) tablet 30 mg 30 mg at 03/23/22 1856   • atorvastatin (LIPITOR) tablet 20 mg 20 mg at 03/23/22 2259   • budesonide-formoterol (SYMBICORT) 160-4.5 MCG/ACT inhaler 2 Puff 2 Puff at 03/24/22 0633   • famotidine (PEPCID) tablet 40 mg 40 mg at 03/24/22 0621   • montelukast (SINGULAIR) tablet 10 mg 10 mg at 03/23/22 2259   • nitroglycerin (NITROSTAT) tablet 0.4 mg     • MD Alert...Warfarin per Pharmacy     • spironolactone (ALDACTONE) tablet 25 mg 25 mg at 03/24/22 0620   • tiotropium (Spiriva Respimat) 2.5 mcg/Act inhalation spray 5 mcg     • warfarin (COUMADIN) tablet 6 mg 6 mg at 03/23/22 1800       ASSESSMENT/PLAN:    46 y.o. female with a history of KATHIA, COPD (3L baseline), Afib (warfarin) admitted for worsening lower extremity swelling     #HFpEF  - 46-year-old female with increasing lower extremity edema and difficulty walking.   Previous echo 2/27/2022 showed EF 70% w/ borderline elevated RVSP of 35 mmHg and concentric LVH.    - On admission her proBNP is  slightly elevated.  She has significant edema in the bilateral lower extremities.  - Admitted for diuresis    Lasix 80mg this am and 40mg IV pm   Spironolactone 25mg daily   Monitor I&Os - patient 15lb heavier than 2 weeks ago        #Morbid Obesity, BMI 53  #Deconditioned   - Patient w/ limited mobility 2/2 body habitus and leg swelling  - Lives in an apartment in [place] with daughter currently  - PT/OT  - Social consult     #Venous stasis  - Patient with bilateral lower extremity erythema consistent with chronic venous stasis.  - Tender on exam, erythema appears to be improving compared to yesterday's exam  - Low suspicion for DVT as patient therapeutic on Warfarin     #COPD  #Chronic hypoxic respiratory failure   - Hx of COPD, baseline 2-3L at home and CPAP at night  - Albuterol nebulized versus MDI every 4 hours as needed  - Symbicort 2 puffs 2 times daily  - Singulair 10 mg p.o. daily     #Atrial fibrillation  - Patient w/ history of Afib  - Continue warfarin, dosing per pharmacy   - INR of 2.25 this am     #Schizophrenia  - Patient w/ history of schizophrenia  - Initially not responsive to questions on admission but conversational today  - Difficulty reconciling medications   -Continue aripiprazole 30 mg daily  -Hold sedating medications including gabapentin and trazodone      #Elevated troponin  - Bumped troponin of 31 then 28, no significant EKG changes  - History not consistent w/ ACS  - Likely 2/2 heart strain, demand ischemia   - No further workup at this time      PLAN  - Increased Lasix for diuresis  - PT/OT to see patient  - Social to talk to patient      Edison Mclaughlin, DO  PGY-3  [facility]

## 2022-03-25 LAB
ALBUMIN SERPL BCP-MCNC: 3.6 G/DL (ref 3.2–4.9)
ALBUMIN/GLOB SERPL: 1.4 G/DL
ALP SERPL-CCNC: 110 U/L (ref 30–99)
ALT SERPL-CCNC: 6 U/L (ref 2–50)
ANION GAP SERPL CALC-SCNC: 4 MMOL/L (ref 7–16)
AST SERPL-CCNC: 6 U/L (ref 12–45)
BILIRUB SERPL-MCNC: 0.8 MG/DL (ref 0.1–1.5)
BUN SERPL-MCNC: 10 MG/DL (ref 8–22)
CALCIUM SERPL-MCNC: 9.1 MG/DL (ref 8.5–10.5)
CHLORIDE SERPL-SCNC: 98 MMOL/L (ref 96–112)
CO2 SERPL-SCNC: 40 MMOL/L (ref 20–33)
CREAT SERPL-MCNC: 0.94 MG/DL (ref 0.5–1.4)
GFR SERPLBLD CREATININE-BSD FMLA CKD-EPI: 75 ML/MIN/1.73 M 2
GLOBULIN SER CALC-MCNC: 2.6 G/DL (ref 1.9–3.5)
GLUCOSE SERPL-MCNC: 106 MG/DL (ref 65–99)
INR PPP: 2.45 (ref 0.87–1.13)
POTASSIUM SERPL-SCNC: 3.9 MMOL/L (ref 3.6–5.5)
PROT SERPL-MCNC: 6.2 G/DL (ref 6–8.2)
PROTHROMBIN TIME: 25.9 SEC (ref 12–14.6)
SODIUM SERPL-SCNC: 142 MMOL/L (ref 135–145)

## 2022-03-25 PROCEDURE — 36415 COLL VENOUS BLD VENIPUNCTURE: CPT

## 2022-03-25 PROCEDURE — 700102 HCHG RX REV CODE 250 W/ 637 OVERRIDE(OP): Performed by: FAMILY MEDICINE

## 2022-03-25 PROCEDURE — 700102 HCHG RX REV CODE 250 W/ 637 OVERRIDE(OP): Performed by: STUDENT IN AN ORGANIZED HEALTH CARE EDUCATION/TRAINING PROGRAM

## 2022-03-25 PROCEDURE — A9270 NON-COVERED ITEM OR SERVICE: HCPCS | Performed by: STUDENT IN AN ORGANIZED HEALTH CARE EDUCATION/TRAINING PROGRAM

## 2022-03-25 PROCEDURE — 770006 HCHG ROOM/CARE - MED/SURG/GYN SEMI*

## 2022-03-25 PROCEDURE — 99406 BEHAV CHNG SMOKING 3-10 MIN: CPT

## 2022-03-25 PROCEDURE — 85610 PROTHROMBIN TIME: CPT

## 2022-03-25 PROCEDURE — 80053 COMPREHEN METABOLIC PANEL: CPT

## 2022-03-25 PROCEDURE — 94640 AIRWAY INHALATION TREATMENT: CPT

## 2022-03-25 PROCEDURE — 99232 SBSQ HOSP IP/OBS MODERATE 35: CPT | Mod: GC | Performed by: FAMILY MEDICINE

## 2022-03-25 PROCEDURE — A9270 NON-COVERED ITEM OR SERVICE: HCPCS | Performed by: FAMILY MEDICINE

## 2022-03-25 RX ORDER — FUROSEMIDE 40 MG/1
40 TABLET ORAL
Status: DISCONTINUED | OUTPATIENT
Start: 2022-03-25 | End: 2022-03-26

## 2022-03-25 RX ADMIN — ARIPIPRAZOLE 30 MG: 15 TABLET ORAL at 16:29

## 2022-03-25 RX ADMIN — SPIRONOLACTONE 25 MG: 25 TABLET, FILM COATED ORAL at 05:45

## 2022-03-25 RX ADMIN — TIOTROPIUM BROMIDE INHALATION SPRAY 5 MCG: 3.12 SPRAY, METERED RESPIRATORY (INHALATION) at 07:36

## 2022-03-25 RX ADMIN — ATORVASTATIN CALCIUM 20 MG: 20 TABLET, FILM COATED ORAL at 21:03

## 2022-03-25 RX ADMIN — POTASSIUM CHLORIDE 40 MEQ: 1500 TABLET, EXTENDED RELEASE ORAL at 05:45

## 2022-03-25 RX ADMIN — BUDESONIDE AND FORMOTEROL FUMARATE DIHYDRATE 2 PUFF: 160; 4.5 AEROSOL RESPIRATORY (INHALATION) at 05:53

## 2022-03-25 RX ADMIN — WARFARIN SODIUM 6 MG: 6 TABLET ORAL at 16:29

## 2022-03-25 RX ADMIN — ACETAMINOPHEN 1000 MG: 500 TABLET ORAL at 05:51

## 2022-03-25 RX ADMIN — FAMOTIDINE 40 MG: 20 TABLET ORAL at 05:45

## 2022-03-25 RX ADMIN — FUROSEMIDE 40 MG: 40 TABLET ORAL at 14:52

## 2022-03-25 RX ADMIN — BUDESONIDE AND FORMOTEROL FUMARATE DIHYDRATE 2 PUFF: 160; 4.5 AEROSOL RESPIRATORY (INHALATION) at 16:29

## 2022-03-25 RX ADMIN — ACETAMINOPHEN 1000 MG: 500 TABLET ORAL at 21:09

## 2022-03-25 RX ADMIN — MONTELUKAST 10 MG: 10 TABLET, FILM COATED ORAL at 21:03

## 2022-03-25 ASSESSMENT — PAIN DESCRIPTION - PAIN TYPE
TYPE: CHRONIC PAIN
TYPE: ACUTE PAIN
TYPE: ACUTE PAIN

## 2022-03-25 NOTE — CARE PLAN
The patient is Stable - Low risk of patient condition declining or worsening    Shift Goals  Clinical Goals: patient will remain free from falls  Patient Goals: get up from the bed    Progress made toward(s) clinical / shift goals:  fall precautions in place. Patient is compliant with use of call light to call for assistance.     Patient is not progressing towards the following goals:

## 2022-03-25 NOTE — PROGRESS NOTES
Assumed care of pt and received bedside report. Pt is A&O x 4, reports pain as 5/10 to perineal area, repositioning and distraction provided, bedside table and personal items within reach, bed locked in low position, SCDs pt currently sitting up in bed eating breakfast, no additional needs at this time.

## 2022-03-25 NOTE — PROGRESS NOTES
Inpatient Anticoagulation Service Note    Date: 3/25/2022    Reason for Anticoagulation: Atrial Fibrillation   Target INR: 2.0 to 3.0  [id number] VASc Score: 2  HAS-BLED Score: 0   Hemoglobin Value: (!) 10.8  Hematocrit Value: 38.1  Lab Platelet Value: 208    INR from last 7 days     Date/Time INR Value    03/25/22 0637 2.45    03/24/22 00:35:01 2.25    03/22/22 2209 2.11        Dose from last 7 days     Date/Time Dose (mg)    03/25/22 1441 6    03/24/22 1322 6    03/23/22 1123 6        Significant Interactions: Statin  Bridge Therapy: No   Reversal Agent Administered: Not Applicable  Comments: Continue with home regimen  Education Material Provided?: No  Pharmacist suggested discharge dosing: home regimen of 6 mg daily    Chantale Hinson, PharmD  [id number]

## 2022-03-25 NOTE — CARE PLAN
The patient is Stable - Low risk of patient condition declining or worsening    Shift Goals  Clinical Goals: Pain management  Patient Goals: Rest and pain control  Family Goals: ARAM    Progress made toward(s) clinical / shift goals:  Pt pain managed throughout shift see MAR and flowsheets. Pt currently in bed resting.    Problem: Knowledge Deficit - Standard  Goal: Patient and family/care givers will demonstrate understanding of plan of care, disease process/condition, diagnostic tests and medications  Outcome: Progressing     Problem: Skin Integrity  Goal: Skin integrity is maintained or improved  Outcome: Progressing     Problem: Pain - Standard  Goal: Alleviation of pain or a reduction in pain to the patient’s comfort goal  Outcome: Progressing     Problem: Fall Risk  Goal: Patient will remain free from falls  Outcome: Progressing

## 2022-03-25 NOTE — PROGRESS NOTES
[facility] FAMILY MEDICINE PROGRESS NOTE     Attending:   Zoe Carbajal MD    Resident:   Brenda Palacios MD    PATIENT:   Lorene Haro; [id number]; 1975    46 y.o. female with a history of KATHIA, COPD (3L baseline), Afib (warfarin) admitted for worsening lower extremity swelling     SUBJECTIVE:   No acute events overnight, continues to have difficulty walking. O2 needs decreased. She denies any new leg pain but is reporting positional discomfort to groin.      OBJECTIVE:  Vitals:    03/25/22 0340 03/25/22 0540 03/25/22 0658 03/25/22 0900   BP: 119/56 113/57 110/48    Pulse: 74 66 74    Resp: 16 17 18    Temp: 37 °C (98.6 °F)  36.6 °C (97.8 °F)    TempSrc: Temporal  Temporal    SpO2: 98% 95% 99% 91%   Weight:       Height:           Intake/Output Summary (Last 24 hours) at 3/25/2022 1251  Last data filed at 3/25/2022 0900  Gross per 24 hour   Intake 990 ml   Output 1100 ml   Net -110 ml       PHYSICAL EXAM:   General: No acute distress, afebrile  HEENT: NC/AT. EOMI.   Cardiovascular: RRR without murmurs, rubs, heaves. Normal capillary refill   Respiratory: poor air movement, no crackles appreciated.    Abdomen: normal bowel sounds, soft, nontender, non-distended   EXT:  PERKINS, edema 1+ to low shin, improving discoloration  Skin: No erythema/lesions   Neuro: Non-focal, alert and orientated       LABS:  Recent Labs     03/22/22 2208 03/24/22  0035   WBC 10.3 7.6   RBC 4.67 4.30   HEMOGLOBIN 11.9* 10.8*   HEMATOCRIT 40.4 38.1   MCV 86.5 88.6   MCH 25.5* 25.1*   RDW 61.2* 61.7*   PLATELETCT 261 208   MPV 11.2 10.2   NEUTSPOLYS 74.80* 73.00*   LYMPHOCYTES 17.50* 17.50*   MONOCYTES 6.10 7.90   EOSINOPHILS 1.10 0.90   BASOPHILS 0.20 0.30   RBCMORPHOLO Present  --      Recent Labs     03/22/22 2208 03/24/22  0035 03/25/22  0637   SODIUM 140 145 142   POTASSIUM 3.6 3.6 3.9   CHLORIDE 102 103 98   CO2 27 35* 40*   BUN 8 6* 10   CREATININE 0.75 0.82 0.94   CALCIUM 9.4 8.9 9.1   ALBUMIN 3.9  --  3.6     Estimated GFR/CRCL =  Estimated Creatinine Clearance: 100.1 mL/min (by C-G formula based on SCr of 0.94 mg/dL).  Recent Labs     03/22/22 2208 03/24/22 0035 03/25/22 0637   GLUCOSE 98 108* 106*     Recent Labs     03/22/22 2208 03/22/22 2209 03/24/22 0035 03/25/22 0637   ASTSGOT 14  --   --  6*   ALTSGPT 11  --   --  6   TBILIRUBIN 0.5  --   --  0.8   ALKPHOSPHAT 137*  --   --  110*   GLOBULIN 3.4  --   --  2.6   INR  --  2.11* 2.25* 2.45*             Recent Labs     03/22/22 2209 03/24/22 0035 03/25/22 0637   INR 2.11* 2.25* 2.45*   APTT 36.7*  --   --        IMAGING:   DX-CHEST-PORTABLE (1 VIEW)   Final Result      Stable cardiomegaly.          MEDS:  Current Facility-Administered Medications   Medication Last Admin   • furosemide (LASIX) tablet 40 mg     • potassium chloride SA (Kdur) tablet 40 mEq 40 mEq at 03/25/22 0545   • acetaminophen (TYLENOL) tablet 1,000 mg 1,000 mg at 03/25/22 0551   • senna-docusate (PERICOLACE or SENOKOT S) 8.6-50 MG per tablet 2 Tablet 2 Tablet at 03/24/22 0620    And   • polyethylene glycol/lytes (MIRALAX) PACKET 1 Packet      And   • magnesium hydroxide (MILK OF MAGNESIA) suspension 30 mL      And   • bisacodyl (DULCOLAX) suppository 10 mg     • albuterol (PROVENTIL) 2.5mg/0.5ml nebulizer solution 2.5 mg     • ARIPiprazole (Abilify) tablet 30 mg 30 mg at 03/24/22 1705   • atorvastatin (LIPITOR) tablet 20 mg 20 mg at 03/24/22 2016   • budesonide-formoterol (SYMBICORT) 160-4.5 MCG/ACT inhaler 2 Puff 2 Puff at 03/25/22 0553   • famotidine (PEPCID) tablet 40 mg 40 mg at 03/25/22 0545   • montelukast (SINGULAIR) tablet 10 mg 10 mg at 03/24/22 2016   • nitroglycerin (NITROSTAT) tablet 0.4 mg     • MD Alert...Warfarin per Pharmacy     • spironolactone (ALDACTONE) tablet 25 mg 25 mg at 03/25/22 0545   • tiotropium (Spiriva Respimat) 2.5 mcg/Act inhalation spray 5 mcg 5 mcg at 03/25/22 0736   • warfarin (COUMADIN) tablet 6 mg 6 mg at 03/24/22 0593       ASSESSMENT/PLAN: 46 y.o. female with a history of  KATHIA, COPD (3L baseline), Afib (warfarin) admitted for worsening lower extremity swelling      #HFpEF  Most recent echo 2/27/2022 showed EF 70% w/ borderline elevated RVSP of 35 mmHg and concentric LVH.  proBNP is slightly elevated.  She has significant edema in the bilateral lower extremities.  - Admitted for diuresis               Lasix 40 po daily starting today              Spironolactone 25mg daily              Monitor I&Os - patient 15lb heavier than 2 weeks ago                   #Morbid Obesity, BMI 53  #Deconditioned   Debility and limited ability to ambulate 2/2 body habitus and leg swelling. Currently living with daughter.   - PT/OT recommendations and evlaluation  - Social consult     #Venous stasis  - Patient with bilateral lower extremity erythema consistent with chronic venous stasis.  - Tender on exam, erythema appears to be improving compared to yesterday's exam  - Low suspicion for DVT as patient therapeutic on Warfarin     #COPD  #Chronic hypoxic respiratory failure   - Hx of COPD, baseline 2-3L at home and CPAP at night  - Albuterol nebulized versus MDI every 4 hours as needed  - Symbicort 2 puffs 2 times daily  - Singulair 10 mg p.o. daily     #Atrial fibrillation  History of Afib, sedentary and bedbound. High risk for thrombus  - Continue warfarin, dosing per pharmacy   - INR of 2.4     #Schizophrenia  Long term diagnosis of schizophrenia  - Continue aripiprazole 30 mg daily  - Hold sedating medications including gabapentin and trazodone       # Troponinemia  31 -->28, no significant EKG changes. History not consistent w/ ACS suspect 2/2 heart strain, demand ischemia.   - No further workup at this time    Core Measures:   Fluids: none  Lines: PIV  Abx: none  DVT prophylaxis: on warfarin therapeutic  Code Status: Full    Disposition: Medically clear for DC tomorrow, pending discharge recommendations from therapies    Brenda Palacios MD   PGY-2 Family Medicine Resident   [facility]

## 2022-03-25 NOTE — PROGRESS NOTES
Received report and assumed care at shift change. Patient A&O x 4, VS stable. Medicated for pain per MAR. Fall precautions in place, bed locked and in lowest position. Needs attended to.

## 2022-03-26 LAB
ALBUMIN SERPL BCP-MCNC: 3.9 G/DL (ref 3.2–4.9)
ALBUMIN/GLOB SERPL: 1.3 G/DL
ALP SERPL-CCNC: 105 U/L (ref 30–99)
ALT SERPL-CCNC: 8 U/L (ref 2–50)
ANION GAP SERPL CALC-SCNC: 8 MMOL/L (ref 7–16)
AST SERPL-CCNC: 11 U/L (ref 12–45)
BILIRUB SERPL-MCNC: 0.6 MG/DL (ref 0.1–1.5)
BUN SERPL-MCNC: 13 MG/DL (ref 8–22)
CALCIUM SERPL-MCNC: 9.4 MG/DL (ref 8.5–10.5)
CHLORIDE SERPL-SCNC: 99 MMOL/L (ref 96–112)
CO2 SERPL-SCNC: 36 MMOL/L (ref 20–33)
CREAT SERPL-MCNC: 1 MG/DL (ref 0.5–1.4)
GFR SERPLBLD CREATININE-BSD FMLA CKD-EPI: 69 ML/MIN/1.73 M 2
GLOBULIN SER CALC-MCNC: 2.9 G/DL (ref 1.9–3.5)
GLUCOSE SERPL-MCNC: 104 MG/DL (ref 65–99)
INR PPP: 2.45 (ref 0.87–1.13)
POTASSIUM SERPL-SCNC: 4.2 MMOL/L (ref 3.6–5.5)
PROT SERPL-MCNC: 6.8 G/DL (ref 6–8.2)
PROTHROMBIN TIME: 25.8 SEC (ref 12–14.6)
SODIUM SERPL-SCNC: 143 MMOL/L (ref 135–145)

## 2022-03-26 PROCEDURE — 700102 HCHG RX REV CODE 250 W/ 637 OVERRIDE(OP): Performed by: STUDENT IN AN ORGANIZED HEALTH CARE EDUCATION/TRAINING PROGRAM

## 2022-03-26 PROCEDURE — 80053 COMPREHEN METABOLIC PANEL: CPT

## 2022-03-26 PROCEDURE — A9270 NON-COVERED ITEM OR SERVICE: HCPCS | Performed by: STUDENT IN AN ORGANIZED HEALTH CARE EDUCATION/TRAINING PROGRAM

## 2022-03-26 PROCEDURE — 94640 AIRWAY INHALATION TREATMENT: CPT

## 2022-03-26 PROCEDURE — 85610 PROTHROMBIN TIME: CPT

## 2022-03-26 PROCEDURE — 700102 HCHG RX REV CODE 250 W/ 637 OVERRIDE(OP): Performed by: FAMILY MEDICINE

## 2022-03-26 PROCEDURE — 36415 COLL VENOUS BLD VENIPUNCTURE: CPT

## 2022-03-26 PROCEDURE — A9270 NON-COVERED ITEM OR SERVICE: HCPCS | Performed by: FAMILY MEDICINE

## 2022-03-26 PROCEDURE — 770006 HCHG ROOM/CARE - MED/SURG/GYN SEMI*

## 2022-03-26 PROCEDURE — 99232 SBSQ HOSP IP/OBS MODERATE 35: CPT | Mod: GC | Performed by: FAMILY MEDICINE

## 2022-03-26 RX ORDER — DIPHENHYDRAMINE HYDROCHLORIDE, ZINC ACETATE 2; .1 G/100G; G/100G
CREAM TOPICAL 3 TIMES DAILY PRN
Status: DISCONTINUED | OUTPATIENT
Start: 2022-03-26 | End: 2022-03-30 | Stop reason: HOSPADM

## 2022-03-26 RX ORDER — FUROSEMIDE 40 MG/1
40 TABLET ORAL
Status: DISCONTINUED | OUTPATIENT
Start: 2022-03-26 | End: 2022-03-30 | Stop reason: HOSPADM

## 2022-03-26 RX ORDER — FLUTICASONE PROPIONATE 50 MCG
2 SPRAY, SUSPENSION (ML) NASAL DAILY
Status: DISCONTINUED | OUTPATIENT
Start: 2022-03-26 | End: 2022-03-30 | Stop reason: HOSPADM

## 2022-03-26 RX ADMIN — FLUTICASONE PROPIONATE 100 MCG: 50 SPRAY, METERED NASAL at 11:49

## 2022-03-26 RX ADMIN — POTASSIUM CHLORIDE 40 MEQ: 1500 TABLET, EXTENDED RELEASE ORAL at 04:53

## 2022-03-26 RX ADMIN — ACETAMINOPHEN 1000 MG: 500 TABLET ORAL at 11:49

## 2022-03-26 RX ADMIN — ATORVASTATIN CALCIUM 20 MG: 20 TABLET, FILM COATED ORAL at 20:22

## 2022-03-26 RX ADMIN — WARFARIN SODIUM 6 MG: 6 TABLET ORAL at 16:45

## 2022-03-26 RX ADMIN — TIOTROPIUM BROMIDE INHALATION SPRAY 5 MCG: 3.12 SPRAY, METERED RESPIRATORY (INHALATION) at 07:57

## 2022-03-26 RX ADMIN — FUROSEMIDE 40 MG: 40 TABLET ORAL at 16:45

## 2022-03-26 RX ADMIN — FAMOTIDINE 40 MG: 20 TABLET ORAL at 04:53

## 2022-03-26 RX ADMIN — FUROSEMIDE 40 MG: 40 TABLET ORAL at 04:52

## 2022-03-26 RX ADMIN — BUDESONIDE AND FORMOTEROL FUMARATE DIHYDRATE 2 PUFF: 160; 4.5 AEROSOL RESPIRATORY (INHALATION) at 05:03

## 2022-03-26 RX ADMIN — BUDESONIDE AND FORMOTEROL FUMARATE DIHYDRATE 2 PUFF: 160; 4.5 AEROSOL RESPIRATORY (INHALATION) at 16:45

## 2022-03-26 RX ADMIN — MONTELUKAST 10 MG: 10 TABLET, FILM COATED ORAL at 20:22

## 2022-03-26 RX ADMIN — SENNOSIDES AND DOCUSATE SODIUM 2 TABLET: 50; 8.6 TABLET ORAL at 04:53

## 2022-03-26 RX ADMIN — SPIRONOLACTONE 25 MG: 25 TABLET, FILM COATED ORAL at 04:53

## 2022-03-26 RX ADMIN — ARIPIPRAZOLE 30 MG: 15 TABLET ORAL at 16:45

## 2022-03-26 ASSESSMENT — PAIN DESCRIPTION - PAIN TYPE: TYPE: ACUTE PAIN

## 2022-03-26 NOTE — PROGRESS NOTES
[facility] PROGRESS NOTE     Attending:   Zoe Carbajal MD    Resident:   Edison Mclaughlin DO    PATIENT:   Lorene Haro; [id number]; 1975    ID:   46 y.o. female with a history of KATHIA, COPD (3L baseline), Afib (warfarin) admitted for worsening lower extremity swelling     SUBJECTIVE:   No acute events overnight, patient still experiencing some leg swelling but less than day before. No other complaints this am    OBJECTIVE:  Vitals:    03/25/22 1453 03/25/22 1546 03/25/22 1937 03/26/22 0340   BP: 111/59 136/53 121/75 124/57   Pulse:  72 70 69   Resp:  18 19 16   Temp:  36.8 °C (98.2 °F) 36.5 °C (97.7 °F) 36.9 °C (98.4 °F)   TempSrc:  Temporal Temporal Temporal   SpO2:  96% 96% 99%   Weight:       Height:           Intake/Output Summary (Last 24 hours) at 3/26/2022 0740  Last data filed at 3/26/2022 0553  Gross per 24 hour   Intake 990 ml   Output 2000 ml   Net -1010 ml       PHYSICAL EXAM:  General: No acute distress, resting lying on back, deconditioned   HEENT: NC/AT. EOMI.   Cardiovascular: RRR without murmurs. Normal capillary refill   Respiratory: Diminished throughout w/o crackles/wheezes  Abdomen: soft, nontender, nondistended, no masses  EXT:  PERKINS, edema improving, erythema improving bilateral lower extremities   Skin: No erythema/lesions   Neuro: Non-focal    LABS:  Recent Labs     03/24/22  0035   WBC 7.6   RBC 4.30   HEMOGLOBIN 10.8*   HEMATOCRIT 38.1   MCV 88.6   MCH 25.1*   RDW 61.7*   PLATELETCT 208   MPV 10.2   NEUTSPOLYS 73.00*   LYMPHOCYTES 17.50*   MONOCYTES 7.90   EOSINOPHILS 0.90   BASOPHILS 0.30     Recent Labs     03/24/22  0035 03/25/22  0637 03/26/22  0206   SODIUM 145 142 143   POTASSIUM 3.6 3.9 4.2   CHLORIDE 103 98 99   CO2 35* 40* 36*   BUN 6* 10 13   CREATININE 0.82 0.94 1.00   CALCIUM 8.9 9.1 9.4   ALBUMIN  --  3.6 3.9     Estimated GFR/CRCL = Estimated Creatinine Clearance: 94.1 mL/min (by C-G formula based on SCr of 1 mg/dL).  Recent Labs     03/24/22  0035 03/25/22  0637  03/26/22  0206   GLUCOSE 108* 106* 104*     Recent Labs     03/24/22  0035 03/25/22  0637 03/26/22  0206   ASTSGOT  --  6* 11*   ALTSGPT  --  6 8   TBILIRUBIN  --  0.8 0.6   ALKPHOSPHAT  --  110* 105*   GLOBULIN  --  2.6 2.9   INR 2.25* 2.45* 2.45*             Recent Labs     03/24/22  0035 03/25/22 0637 03/26/22  0206   INR 2.25* 2.45* 2.45*         IMAGING:  DX-CHEST-PORTABLE (1 VIEW)   Final Result      Stable cardiomegaly.          MEDS:  Current Facility-Administered Medications   Medication Last Admin   • furosemide (LASIX) tablet 40 mg 40 mg at 03/26/22 0452   • potassium chloride SA (Kdur) tablet 40 mEq 40 mEq at 03/26/22 0453   • acetaminophen (TYLENOL) tablet 1,000 mg 1,000 mg at 03/25/22 2109   • senna-docusate (PERICOLACE or SENOKOT S) 8.6-50 MG per tablet 2 Tablet 2 Tablet at 03/26/22 0453    And   • polyethylene glycol/lytes (MIRALAX) PACKET 1 Packet      And   • magnesium hydroxide (MILK OF MAGNESIA) suspension 30 mL      And   • bisacodyl (DULCOLAX) suppository 10 mg     • albuterol (PROVENTIL) 2.5mg/0.5ml nebulizer solution 2.5 mg     • ARIPiprazole (Abilify) tablet 30 mg 30 mg at 03/25/22 1629   • atorvastatin (LIPITOR) tablet 20 mg 20 mg at 03/25/22 2103   • budesonide-formoterol (SYMBICORT) 160-4.5 MCG/ACT inhaler 2 Puff 2 Puff at 03/26/22 0503   • famotidine (PEPCID) tablet 40 mg 40 mg at 03/26/22 0453   • montelukast (SINGULAIR) tablet 10 mg 10 mg at 03/25/22 2103   • nitroglycerin (NITROSTAT) tablet 0.4 mg     • MD Alert...Warfarin per Pharmacy     • spironolactone (ALDACTONE) tablet 25 mg 25 mg at 03/26/22 0453   • tiotropium (Spiriva Respimat) 2.5 mcg/Act inhalation spray 5 mcg 5 mcg at 03/25/22 0736   • warfarin (COUMADIN) tablet 6 mg 6 mg at 03/25/22 1183       ASSESSMENT/PLAN:    46 y.o. female with a history of KATHIA, COPD (3L baseline), Afib (warfarin) admitted for worsening lower extremity swelling      #HFpEF  - 46-year-old female with increasing lower extremity edema and difficulty  walking.   Previous echo 2/27/2022 showed EF 70% w/ borderline elevated RVSP of 35 mmHg and concentric LVH.    - On admission her proBNP is slightly elevated.  She has significant edema in the bilateral lower extremities.  - Admitted for diuresis               Spironolactone 25mg daily              Switching to PO Lasix today, decreasing leg swelling                   #Morbid Obesity, BMI 53  #Deconditioned   - Patient w/ limited mobility 2/2 body habitus and leg swelling  - Lives in an apartment in [place] with daughter currently  - PT/OT  - Social consult     #Venous stasis  - Patient with bilateral lower extremity erythema consistent with chronic venous stasis.  - Tender on exam, erythema appears to be improving compared to yesterday's exam  - Low suspicion for DVT as patient therapeutic on Warfarin     #COPD  #Chronic hypoxic respiratory failure   - Hx of COPD, baseline 2-3L at home and CPAP at night  - Albuterol nebulized versus MDI every 4 hours as needed  - Symbicort 2 puffs 2 times daily  - Singulair 10 mg p.o. daily     #Atrial fibrillation  - Patient w/ history of Afib  - Continue warfarin, dosing per pharmacy   - INR of 2.25 this am     #Schizophrenia  - Patient w/ history of schizophrenia  - Initially not responsive to questions on admission but conversational today  - Difficulty reconciling medications   -Continue aripiprazole 30 mg daily  -Hold sedating medications including gabapentin and trazodone       #Elevated troponin  - Bumped troponin of 31 then 28, no significant EKG changes  - History not consistent w/ ACS  - Likely 2/2 heart strain, demand ischemia   - No further workup at this time        PLAN  - Switch to PO Lasix  - PT/OT recommend placement  - Social to help with placement  - Medically stable for discharge         Edison Mclaughlin, DO  PGY-3  [facility]

## 2022-03-26 NOTE — PROGRESS NOTES
Received report and assumed care at shift change. A&O x 4 , cooperative with cares. Medicated for pain per MAR.  Fall precautions in place, bed locked and in lowest position. Needs attended to.

## 2022-03-26 NOTE — PROGRESS NOTES
Inpatient Anticoagulation Service Note    Date: 3/26/2022    Reason for Anticoagulation: Atrial Fibrillation   Target INR: 2.0 to 3.0  [id number] VASc Score: 2  HAS-BLED Score: 0   Hemoglobin Value: (!) 10.8  Hematocrit Value: 38.1  Lab Platelet Value: 208    INR from last 7 days     Date/Time INR Value    03/26/22 0206 2.45    03/25/22 0637 2.45    03/24/22 00:35:01 2.25    03/22/22 2209 2.11        Dose from last 7 days     Date/Time Dose (mg)    03/26/22 1606 6    03/25/22 1441 6    03/24/22 1322 6    03/23/22 1123 6        Significant Interactions: Statin  Bridge Therapy: No   Reversal Agent Administered: Not Applicable  Comments: Continue with home regimen  Education Material Provided?: No  Pharmacist suggested discharge dosing: home regimen of 6 mg daily.     Chantale Hinson, PharmD  [id number]

## 2022-03-26 NOTE — PROGRESS NOTES
Received bedside report from night shift RN.   Assumed care of patient at change of shift.   Assessment complete and POC discussed.   Patient is A&Ox4, VSS, on 3L of oxygen (baseline 2-3 at home).   No apparent signs of distress or discomfort.   Patient is sitting up in bariatric chair for breakfast.   Bed is in lowest/locked position.   Call light and belongings are within reach.   No further needs at this time.    1100: Patient requesting flonase, states she takes it at home. Patient also c/o burning sensation around vagina. Patient has some redness with blanching. Slight odor from vaginal area. Patient also has bumps/possible warts around vaginal opening. Notified MD. New orders to follow.

## 2022-03-26 NOTE — CARE PLAN
The patient is Stable - Low risk of patient condition declining or worsening    Shift Goals  Clinical Goals: Pain management    Progress made toward(s) clinical / shift goals:  Patient reports pain in bilateral lower extremities. Pain managed with PRN Tylenol 1000mg, repositioning, and elevation.

## 2022-03-26 NOTE — DOCUMENTATION QUERY
[facility]                                                                       Query Response Note      PATIENT:               ADELINA MILLAN  ACCT #:                  [id number]  MRN:                     [id number]  :                      1975  ADMIT DATE:       3/22/2022 9:34 PM  DISCH DATE:          RESPONDING  PROVIDER #:        [id number]           QUERY TEXT:    HFpEF is documented in the Medical Record. Please specify the acuity of this condition.    NOTE:  If the appropriate response is not listed below, please respond with a new note.    The patient's Clinical Indicators include:  Per 3/24 Progress Note: HFpEF , significant edema in LE's .  Previous echo (2022) showed EF 70% w/ borderline elevated RVSP of 35 mmHG and concentric LVH   3/22 NT-proBNP 552  Risk Factors: COPD, atrial fib, pulmonary htn, no documented history of CHF but pt has history of echocardiograms   Treatment: IV Lasix, spironolactone    Thank you,  Carlos Reyes RN , BSN  Clinical   Connect via [employer]  Options provided:   -- Acute diastolic heart failure   -- Acute on chronic diastolic heart failure   -- Other explanation of acuity of HF, -please specify   -- Unable to Determine      Query created by: Carlos Reyes on 3/25/2022 8:44 AM    RESPONSE TEXT:    Acute on chronic diastolic heart failure          Electronically signed by:  TANIKA YEN MD 3/26/2022 6:24 AM

## 2022-03-26 NOTE — CARE PLAN
The patient is Stable - Low risk of patient condition declining or worsening    Shift Goals  Clinical Goals: pain management  Patient Goals: Rest and pain control  Family Goals: ARAM    Progress made toward(s) clinical / shift goals:  patient repositioned and elevated BLE. Medicated for pain per MAR.     Patient is not progressing towards the following goals:

## 2022-03-27 LAB
APPEARANCE UR: CLEAR
BACTERIA #/AREA URNS HPF: ABNORMAL /HPF
BILIRUB UR QL STRIP.AUTO: NEGATIVE
COLOR UR: YELLOW
EPI CELLS #/AREA URNS HPF: ABNORMAL /HPF
GLUCOSE UR STRIP.AUTO-MCNC: NEGATIVE MG/DL
GRAN CASTS #/AREA URNS LPF: ABNORMAL /LPF
HYALINE CASTS #/AREA URNS LPF: ABNORMAL /LPF
INR PPP: 2.07 (ref 0.87–1.13)
KETONES UR STRIP.AUTO-MCNC: NEGATIVE MG/DL
LEUKOCYTE ESTERASE UR QL STRIP.AUTO: ABNORMAL
MICRO URNS: ABNORMAL
NITRITE UR QL STRIP.AUTO: NEGATIVE
PH UR STRIP.AUTO: 7 [PH] (ref 5–8)
PROT UR QL STRIP: NEGATIVE MG/DL
PROTHROMBIN TIME: 22.7 SEC (ref 12–14.6)
RBC # URNS HPF: ABNORMAL /HPF
RBC UR QL AUTO: NEGATIVE
RENAL EPI CELLS #/AREA URNS HPF: ABNORMAL /HPF
SP GR UR STRIP.AUTO: 1.01
UROBILINOGEN UR STRIP.AUTO-MCNC: 0.2 MG/DL
WBC #/AREA URNS HPF: ABNORMAL /HPF
YEAST BUDDING URNS QL: PRESENT /HPF

## 2022-03-27 PROCEDURE — 94760 N-INVAS EAR/PLS OXIMETRY 1: CPT

## 2022-03-27 PROCEDURE — 94660 CPAP INITIATION&MGMT: CPT

## 2022-03-27 PROCEDURE — 700102 HCHG RX REV CODE 250 W/ 637 OVERRIDE(OP): Performed by: FAMILY MEDICINE

## 2022-03-27 PROCEDURE — 700102 HCHG RX REV CODE 250 W/ 637 OVERRIDE(OP): Performed by: STUDENT IN AN ORGANIZED HEALTH CARE EDUCATION/TRAINING PROGRAM

## 2022-03-27 PROCEDURE — 81001 URINALYSIS AUTO W/SCOPE: CPT

## 2022-03-27 PROCEDURE — 770006 HCHG ROOM/CARE - MED/SURG/GYN SEMI*

## 2022-03-27 PROCEDURE — A9270 NON-COVERED ITEM OR SERVICE: HCPCS | Performed by: STUDENT IN AN ORGANIZED HEALTH CARE EDUCATION/TRAINING PROGRAM

## 2022-03-27 PROCEDURE — 99232 SBSQ HOSP IP/OBS MODERATE 35: CPT | Mod: GC | Performed by: FAMILY MEDICINE

## 2022-03-27 PROCEDURE — 36415 COLL VENOUS BLD VENIPUNCTURE: CPT

## 2022-03-27 PROCEDURE — 94640 AIRWAY INHALATION TREATMENT: CPT

## 2022-03-27 PROCEDURE — 85610 PROTHROMBIN TIME: CPT

## 2022-03-27 PROCEDURE — A9270 NON-COVERED ITEM OR SERVICE: HCPCS | Performed by: FAMILY MEDICINE

## 2022-03-27 RX ADMIN — TIOTROPIUM BROMIDE INHALATION SPRAY 5 MCG: 3.12 SPRAY, METERED RESPIRATORY (INHALATION) at 11:30

## 2022-03-27 RX ADMIN — BUDESONIDE AND FORMOTEROL FUMARATE DIHYDRATE 2 PUFF: 160; 4.5 AEROSOL RESPIRATORY (INHALATION) at 16:50

## 2022-03-27 RX ADMIN — WARFARIN SODIUM 6 MG: 6 TABLET ORAL at 16:50

## 2022-03-27 RX ADMIN — ATORVASTATIN CALCIUM 20 MG: 20 TABLET, FILM COATED ORAL at 20:25

## 2022-03-27 RX ADMIN — POTASSIUM CHLORIDE 40 MEQ: 1500 TABLET, EXTENDED RELEASE ORAL at 05:54

## 2022-03-27 RX ADMIN — ACETAMINOPHEN 1000 MG: 500 TABLET ORAL at 09:23

## 2022-03-27 RX ADMIN — BUDESONIDE AND FORMOTEROL FUMARATE DIHYDRATE 2 PUFF: 160; 4.5 AEROSOL RESPIRATORY (INHALATION) at 05:55

## 2022-03-27 RX ADMIN — SPIRONOLACTONE 25 MG: 25 TABLET, FILM COATED ORAL at 05:54

## 2022-03-27 RX ADMIN — FLUTICASONE PROPIONATE 100 MCG: 50 SPRAY, METERED NASAL at 05:55

## 2022-03-27 RX ADMIN — FUROSEMIDE 40 MG: 40 TABLET ORAL at 05:55

## 2022-03-27 RX ADMIN — FUROSEMIDE 40 MG: 40 TABLET ORAL at 16:50

## 2022-03-27 RX ADMIN — FAMOTIDINE 40 MG: 20 TABLET ORAL at 05:55

## 2022-03-27 RX ADMIN — ARIPIPRAZOLE 30 MG: 15 TABLET ORAL at 16:50

## 2022-03-27 RX ADMIN — MONTELUKAST 10 MG: 10 TABLET, FILM COATED ORAL at 20:25

## 2022-03-27 ASSESSMENT — PAIN DESCRIPTION - PAIN TYPE
TYPE: ACUTE PAIN

## 2022-03-27 NOTE — FACE TO FACE
Face to Face Supporting Documentation - Home Health    The encounter with this patient was in whole or in part the primary reason for home health admission.    Date of encounter:   Patient:                    MRN:                       YOB: 2024  Lorene Haro  [id number]  1975     Home health to see patient for:  Home health aide, Physical Therapy evaluation and treatment and Occupational therapy evaluation and treatment    Skilled need for:  Recent Deterioration of Health Status HFpEF, debility    Skilled nursing interventions to include:  Comment: assistance w/ medications, getting in and out of bed    Homebound status evidenced by:  Require the use of special transportation. Leaving home requires a considerable and taxing effort. There is a normal inability to leave the home.    Community Physician to provide follow up care: Mallika Sharp M.D.     Optional Interventions? No      I certify the face to face encounter for this home health care referral meets the CMS requirements and the encounter/clinical assessment with the patient was, in whole, or in part, for the medical condition(s) listed above, which is the primary reason for home health care. Based on my clinical findings: the service(s) are medically necessary, support the need for home health care, and the homebound criteria are met.  I certify that this patient has had a face to face encounter by myself.  Edison Mclaughlin D.O. - NPI: [id number]

## 2022-03-27 NOTE — PROGRESS NOTES
Inpatient Anticoagulation Service Note  Date: 3/27/2022  Reason for Anticoagulation: Atrial Fibrillation   [id number] VASc Score: 2  HAS-BLED Score: 0   Hemoglobin Value: (!) 10.8  Hematocrit Value: 38.1  Lab Platelet Value: 208  Target INR: 2.0 to 3.0  INR from last 7 days     Date/Time INR Value    03/27/22 0442 2.07    03/26/22 0206 2.45    03/25/22 0637 2.45    03/24/22 00:35:01 2.25    03/22/22 2209 2.11        Dose from last 7 days     Date/Time Dose (mg)    03/27/22 1129 6    03/26/22 1606 6    03/25/22 1441 6    03/24/22 1322 6    03/23/22 1123 6        Average Dose (mg): TBD (Home Dose: 6 mg daily)  Significant Interactions: Statin  Bridge Therapy: No  Reversal Agent Administered: Not Applicable  Comments: INR at goal range today. No new H/H or PLT yet today. No overt bleeding noted per chart review. Warfarin interactions noted.  Will give warfarin 6 mg today. INR with AM labs.    Plan:  Warfarin 6 mg 3/27/22  Education Material Provided?: No (chronic warfarin patient)  Pharmacist suggested discharge dosing: warfarin 6 mg daily    Nader Mullins, PharmD

## 2022-03-27 NOTE — DISCHARGE PLANNING
Received Choice form at 1007  Agency/Facility Name: [facility]   Referral sent per Choice form @ [phone]

## 2022-03-27 NOTE — DISCHARGE PLANNING
Hospital Care Management Discharge Planning      Anticipated Discharge Disposition: Likely HH vs home with caregiver     Action/Information: KETTY reviewed chart. RN CM left  for [facility] to see if they can accept Medicaid pt's, however, no referral has been sent. KETTY requested order/face to face from MD and sent choice form to [facility] for [facility].     Pt has [place] Medicaid F[place], so likely will be able to get SNF and/or HH services through other local agencies.      Barriers: [place] Medicaid [place] is barrier to post acute services     Plan:  Hospital Care Management Team will continue to collaborate with pt/family, medical care team, and outpatient providers for ongoing discharge planning and collaboration.

## 2022-03-27 NOTE — CARE PLAN
The patient is Stable - Low risk of patient condition declining or worsening    Shift Goals  Clinical Goals: Pain management  Patient Goals: Rest and pain control  Family Goals: ARAM    Progress made toward(s) clinical / shift goals:  Pain managed with repositioning and PRN Tylenol. No signs of distress. Patient up frequently to chair throughout shift.

## 2022-03-27 NOTE — PROGRESS NOTES
Received bedside report from night shift RN.   Assumed care of patient at change of shift.   Assessment complete and POC discussed.   Patient is A&Ox4 (intermittent forgetfulness), VSS, on 3L of oxygen via NC..   Reports BLE discomfort - medicated with Tylenol per MAR.  Patient is sitting up in chair for breakfast.   Bed is in lowest/locked position.   Call light and belongings are within reach.   No further needs at this time.

## 2022-03-27 NOTE — PROGRESS NOTES
[facility] FAMILY MEDICINE PROGRESS NOTE     Attending:   Zoe Carbajal MD    Resident:   Edison Mclaughlin DO    PATIENT:   Lorene Haro; [id number]; 1975    ID:   46 y.o. female with a history of KATHIA, COPD (3L baseline), Afib (warfarin) admitted for worsening lower extremity swelling     SUBJECTIVE:   No acute events overnight, patient feeling slightly improved today. Still having some leg pain. No fevers or chills    OBJECTIVE:  Vitals:    03/26/22 1600 03/26/22 1941 03/27/22 0124 03/27/22 0345   BP: 116/54 145/56  133/60   Pulse: 60 66 65 66   Resp: 18 19 17 19   Temp: 36.2 °C (97.2 °F) 36.5 °C (97.7 °F)  37 °C (98.6 °F)   TempSrc: Temporal Temporal  Temporal   SpO2: 95% 98% 96% 98%   Weight:       Height:           Intake/Output Summary (Last 24 hours) at 3/27/2022 0921  Last data filed at 3/26/2022 1953  Gross per 24 hour   Intake 660 ml   Output --   Net 660 ml       PHYSICAL EXAM:  General: No acute distress, afebrile, deconditioned, lying on back   HEENT: NC/AT. EOMI.   Cardiovascular: RRR without murmurs. Normal capillary refill   Respiratory: Diminished throughout on anterior auscultation w/o any wheezes/crackles  Abdomen: soft, nontender, nondistended, no masses  EXT:  PERKINS, bilateral leg venous statis, improving compared to yesterday  Skin: See EXT   Neuro: Non-focal    LABS:  No results for input(s): WBC, RBC, HEMOGLOBIN, HEMATOCRIT, MCV, MCH, RDW, PLATELETCT, MPV, NEUTSPOLYS, LYMPHOCYTES, MONOCYTES, EOSINOPHILS, BASOPHILS, RBCMORPHOLO in the last 72 hours.  Recent Labs     03/25/22  0637 03/26/22  0206   SODIUM 142 143   POTASSIUM 3.9 4.2   CHLORIDE 98 99   CO2 40* 36*   BUN 10 13   CREATININE 0.94 1.00   CALCIUM 9.1 9.4   ALBUMIN 3.6 3.9     Estimated GFR/CRCL = Estimated Creatinine Clearance: 94.1 mL/min (by C-G formula based on SCr of 1 mg/dL).  Recent Labs     03/25/22  0637 03/26/22  0206   GLUCOSE 106* 104*     Recent Labs     03/25/22  0637 03/26/22  0206 03/27/22  0442   ASTSGOT 6* 11*  --    ALTSGPT 6  8  --    TBILIRUBIN 0.8 0.6  --    ALKPHOSPHAT 110* 105*  --    GLOBULIN 2.6 2.9  --    INR 2.45* 2.45* 2.07*             Recent Labs     03/25/22  0637 03/26/22  0206 03/27/22  0442   INR 2.45* 2.45* 2.07*         IMAGING:  DX-CHEST-PORTABLE (1 VIEW)   Final Result      Stable cardiomegaly.          MEDS:  Current Facility-Administered Medications   Medication Last Admin   • furosemide (LASIX) tablet 40 mg 40 mg at 03/27/22 0555   • fluticasone (FLONASE) nasal spray 100 mcg 100 mcg at 03/27/22 0555   • diphenhydrAMINE-zinc acetate (BENADRYL ITCH) topical cream     • potassium chloride SA (Kdur) tablet 40 mEq 40 mEq at 03/27/22 0554   • acetaminophen (TYLENOL) tablet 1,000 mg 1,000 mg at 03/26/22 1149   • senna-docusate (PERICOLACE or SENOKOT S) 8.6-50 MG per tablet 2 Tablet 2 Tablet at 03/26/22 0453    And   • polyethylene glycol/lytes (MIRALAX) PACKET 1 Packet      And   • magnesium hydroxide (MILK OF MAGNESIA) suspension 30 mL      And   • bisacodyl (DULCOLAX) suppository 10 mg     • albuterol (PROVENTIL) 2.5mg/0.5ml nebulizer solution 2.5 mg     • ARIPiprazole (Abilify) tablet 30 mg 30 mg at 03/26/22 1645   • atorvastatin (LIPITOR) tablet 20 mg 20 mg at 03/26/22 2022   • budesonide-formoterol (SYMBICORT) 160-4.5 MCG/ACT inhaler 2 Puff 2 Puff at 03/27/22 0555   • famotidine (PEPCID) tablet 40 mg 40 mg at 03/27/22 0555   • montelukast (SINGULAIR) tablet 10 mg 10 mg at 03/26/22 2022   • nitroglycerin (NITROSTAT) tablet 0.4 mg     • MD Alert...Warfarin per Pharmacy     • spironolactone (ALDACTONE) tablet 25 mg 25 mg at 03/27/22 0554   • tiotropium (Spiriva Respimat) 2.5 mcg/Act inhalation spray 5 mcg 5 mcg at 03/26/22 8838   • warfarin (COUMADIN) tablet 6 mg 6 mg at 03/26/22 0788       ASSESSMENT/PLAN:  46 y.o. female with a history of KATHIA, COPD (3L baseline), Afib (warfarin) admitted for worsening lower extremity swelling      #HFpEF  - 46-year-old female with increasing lower extremity edema and difficulty  walking.   Previous echo 2/27/2022 showed EF 70% w/ borderline elevated RVSP of 35 mmHg and concentric LVH.    - On admission her proBNP is slightly elevated.  She has significant edema in the bilateral lower extremities.  - Admitted for diuresis               Spironolactone 25mg daily              Switching to PO Lasix on 3/26, decreasing leg swelling                   #Morbid Obesity, BMI 53  #Deconditioned   - Patient w/ limited mobility 2/2 body habitus and leg swelling  - Lives in an apartment in [place] with daughter currently  - PT/OT - plan for SNF placement   - Social consult - assisting with placement      #Venous stasis  - Patient with bilateral lower extremity erythema consistent with chronic venous stasis.  - Tender on exam, erythema appears to be improving compared to yesterday's exam  - Low suspicion for DVT as patient therapeutic on Warfarin     #COPD  #Chronic hypoxic respiratory failure   - Hx of COPD, baseline 2-3L at home and CPAP at night  - Albuterol nebulized versus MDI every 4 hours as needed  - Symbicort 2 puffs 2 times daily  - Singulair 10 mg p.o. daily     #Atrial fibrillation  - Patient w/ history of Afib  - Continue warfarin, dosing per pharmacy   - INR of 2.25 this am     #Schizophrenia  - Patient w/ history of schizophrenia  - Initially not responsive to questions on admission but conversational today  - Difficulty reconciling medications   -Continue aripiprazole 30 mg daily  -Hold sedating medications including gabapentin and trazodone       #Elevated troponin  - Bumped troponin of 31 then 28, no significant EKG changes  - History not consistent w/ ACS  - Likely 2/2 heart strain, demand ischemia   - No further workup at this time        PLAN  - PT/OT recommend placement  - Social to help with placement  - Medically stable for discharge           Edison Mclaughlin, DO  PGY-3  [facility]

## 2022-03-28 ENCOUNTER — HOSPITAL ENCOUNTER (INPATIENT)
Facility: REHABILITATION | Age: 47
End: 2022-03-28
Attending: PHYSICAL MEDICINE & REHABILITATION | Admitting: PHYSICAL MEDICINE & REHABILITATION
Payer: MEDICAID

## 2022-03-28 LAB
ANION GAP SERPL CALC-SCNC: 9 MMOL/L (ref 7–16)
BUN SERPL-MCNC: 18 MG/DL (ref 8–22)
CALCIUM SERPL-MCNC: 9.8 MG/DL (ref 8.5–10.5)
CHLORIDE SERPL-SCNC: 96 MMOL/L (ref 96–112)
CO2 SERPL-SCNC: 34 MMOL/L (ref 20–33)
CREAT SERPL-MCNC: 0.96 MG/DL (ref 0.5–1.4)
GFR SERPLBLD CREATININE-BSD FMLA CKD-EPI: 73 ML/MIN/1.73 M 2
GLUCOSE SERPL-MCNC: 124 MG/DL (ref 65–99)
INR PPP: 2.19 (ref 0.87–1.13)
POTASSIUM SERPL-SCNC: 4.5 MMOL/L (ref 3.6–5.5)
PROTHROMBIN TIME: 23.7 SEC (ref 12–14.6)
SODIUM SERPL-SCNC: 139 MMOL/L (ref 135–145)

## 2022-03-28 PROCEDURE — 94640 AIRWAY INHALATION TREATMENT: CPT

## 2022-03-28 PROCEDURE — A9270 NON-COVERED ITEM OR SERVICE: HCPCS | Performed by: FAMILY MEDICINE

## 2022-03-28 PROCEDURE — 99232 SBSQ HOSP IP/OBS MODERATE 35: CPT | Mod: GC | Performed by: FAMILY MEDICINE

## 2022-03-28 PROCEDURE — A9270 NON-COVERED ITEM OR SERVICE: HCPCS | Performed by: STUDENT IN AN ORGANIZED HEALTH CARE EDUCATION/TRAINING PROGRAM

## 2022-03-28 PROCEDURE — 770006 HCHG ROOM/CARE - MED/SURG/GYN SEMI*

## 2022-03-28 PROCEDURE — 700102 HCHG RX REV CODE 250 W/ 637 OVERRIDE(OP): Performed by: FAMILY MEDICINE

## 2022-03-28 PROCEDURE — 700102 HCHG RX REV CODE 250 W/ 637 OVERRIDE(OP): Performed by: STUDENT IN AN ORGANIZED HEALTH CARE EDUCATION/TRAINING PROGRAM

## 2022-03-28 PROCEDURE — 36415 COLL VENOUS BLD VENIPUNCTURE: CPT

## 2022-03-28 PROCEDURE — 94660 CPAP INITIATION&MGMT: CPT

## 2022-03-28 PROCEDURE — 85610 PROTHROMBIN TIME: CPT

## 2022-03-28 PROCEDURE — 80048 BASIC METABOLIC PNL TOTAL CA: CPT

## 2022-03-28 RX ORDER — KETOCONAZOLE 20 MG/G
CREAM TOPICAL DAILY
Status: DISCONTINUED | OUTPATIENT
Start: 2022-03-28 | End: 2022-03-30 | Stop reason: HOSPADM

## 2022-03-28 RX ADMIN — WARFARIN SODIUM 6 MG: 6 TABLET ORAL at 17:14

## 2022-03-28 RX ADMIN — ATORVASTATIN CALCIUM 20 MG: 20 TABLET, FILM COATED ORAL at 20:54

## 2022-03-28 RX ADMIN — FLUTICASONE PROPIONATE 100 MCG: 50 SPRAY, METERED NASAL at 04:58

## 2022-03-28 RX ADMIN — TIOTROPIUM BROMIDE INHALATION SPRAY 5 MCG: 3.12 SPRAY, METERED RESPIRATORY (INHALATION) at 07:31

## 2022-03-28 RX ADMIN — ARIPIPRAZOLE 30 MG: 15 TABLET ORAL at 17:14

## 2022-03-28 RX ADMIN — BUDESONIDE AND FORMOTEROL FUMARATE DIHYDRATE 2 PUFF: 160; 4.5 AEROSOL RESPIRATORY (INHALATION) at 17:14

## 2022-03-28 RX ADMIN — FAMOTIDINE 40 MG: 20 TABLET ORAL at 04:59

## 2022-03-28 RX ADMIN — KETOCONAZOLE: 20 CREAM TOPICAL at 15:37

## 2022-03-28 RX ADMIN — MONTELUKAST 10 MG: 10 TABLET, FILM COATED ORAL at 20:55

## 2022-03-28 RX ADMIN — SPIRONOLACTONE 25 MG: 25 TABLET, FILM COATED ORAL at 04:58

## 2022-03-28 RX ADMIN — POTASSIUM CHLORIDE 40 MEQ: 1500 TABLET, EXTENDED RELEASE ORAL at 04:58

## 2022-03-28 RX ADMIN — FUROSEMIDE 40 MG: 40 TABLET ORAL at 04:59

## 2022-03-28 RX ADMIN — FUROSEMIDE 40 MG: 40 TABLET ORAL at 15:37

## 2022-03-28 RX ADMIN — BUDESONIDE AND FORMOTEROL FUMARATE DIHYDRATE 2 PUFF: 160; 4.5 AEROSOL RESPIRATORY (INHALATION) at 04:58

## 2022-03-28 ASSESSMENT — COGNITIVE AND FUNCTIONAL STATUS - GENERAL
HELP NEEDED FOR BATHING: A LITTLE
DRESSING REGULAR UPPER BODY CLOTHING: A LITTLE
MOBILITY SCORE: 18
DRESSING REGULAR LOWER BODY CLOTHING: A LOT
TOILETING: A LOT
CLIMB 3 TO 5 STEPS WITH RAILING: A LITTLE
SUGGESTED CMS G CODE MODIFIER DAILY ACTIVITY: CK
MOVING TO AND FROM BED TO CHAIR: A LITTLE
SUGGESTED CMS G CODE MODIFIER MOBILITY: CK
TURNING FROM BACK TO SIDE WHILE IN FLAT BAD: A LOT
STANDING UP FROM CHAIR USING ARMS: A LITTLE
MOVING FROM LYING ON BACK TO SITTING ON SIDE OF FLAT BED: A LITTLE
DAILY ACTIVITIY SCORE: 18

## 2022-03-28 ASSESSMENT — PAIN DESCRIPTION - PAIN TYPE
TYPE: ACUTE PAIN
TYPE: CHRONIC PAIN

## 2022-03-28 NOTE — PROGRESS NOTES
Assumed care of patient, bedside report received from JC Suarez. Updated on POC, call light within reach and fall precautions in place. Bed locked and in lowest position. Patient instructed to call for assistance before getting out of bed. All questions answered, no other needs at this time.

## 2022-03-28 NOTE — DISCHARGE PLANNING
Received Choice form at 0903  Agency/Facility Name: [facility]  Referral sent per Choice form @ [phone]

## 2022-03-28 NOTE — DISCHARGE PLANNING
Received Choice form at 0383  Agency/Facility Name: [facility]  Referral sent per Choice form @ [phone]

## 2022-03-28 NOTE — PROGRESS NOTES
[facility] PROGRESS NOTE     Attending:   Liz Gold MD    Resident:   Edison Mclaughlin DO    PATIENT:   Lorene Haro; [id number]; 1975    ID:   46 y.o. female with a history of KATHIA, COPD (3L baseline), Afib (warfarin) admitted for worsening lower extremity swelling        SUBJECTIVE:   No acute events overnight, patient still reporting leg pain bilaterally. No increased work of breathing    OBJECTIVE:  Vitals:    03/27/22 1940 03/27/22 2350 03/28/22 0325 03/28/22 0336   BP: 117/59   116/62   Pulse: 67 66 68 61   Resp: 19 16 16 19   Temp: 36.3 °C (97.4 °F)   36.6 °C (97.9 °F)   TempSrc: Temporal   Temporal   SpO2: 94% 95% 95% 93%   Weight:       Height:           Intake/Output Summary (Last 24 hours) at 3/28/2022 0705  Last data filed at 3/28/2022 0513  Gross per 24 hour   Intake 400 ml   Output 2000 ml   Net -1600 ml       PHYSICAL EXAM:  General: No acute distress, afebrile, deconditioned  HEENT: NC/AT. EOMI.   Cardiovascular: RRR without murmurs. Normal capillary refill   Respiratory: Diffusely diminished anteriorly   Abdomen: soft, nontender, nondistended, no masses  EXT:  PERKINS, mild bilateral erythema and swellling, improving   Skin: No erythema/lesions   Neuro: Non-focal    LABS:  No results for input(s): WBC, RBC, HEMOGLOBIN, HEMATOCRIT, MCV, MCH, RDW, PLATELETCT, MPV, NEUTSPOLYS, LYMPHOCYTES, MONOCYTES, EOSINOPHILS, BASOPHILS, RBCMORPHOLO in the last 72 hours.  Recent Labs     03/26/22 0206 03/28/22 0222   SODIUM 143 139   POTASSIUM 4.2 4.5   CHLORIDE 99 96   CO2 36* 34*   BUN 13 18   CREATININE 1.00 0.96   CALCIUM 9.4 9.8   ALBUMIN 3.9  --      Estimated GFR/CRCL = Estimated Creatinine Clearance: 98 mL/min (by C-G formula based on SCr of 0.96 mg/dL).  Recent Labs     03/26/22 0206 03/28/22 0222   GLUCOSE 104* 124*     Recent Labs     03/26/22 0206 03/27/22  0442 03/28/22 0222   ASTSGOT 11*  --   --    ALTSGPT 8  --   --    TBILIRUBIN 0.6  --   --    ALKPHOSPHAT 105*  --   --     GLOBULIN 2.9  --   --    INR 2.45* 2.07* 2.19*             Recent Labs     03/26/22  0206 03/27/22  0442 03/28/22  0222   INR 2.45* 2.07* 2.19*         IMAGING:  DX-CHEST-PORTABLE (1 VIEW)   Final Result      Stable cardiomegaly.          MEDS:  Current Facility-Administered Medications   Medication Last Admin   • furosemide (LASIX) tablet 40 mg 40 mg at 03/28/22 0459   • fluticasone (FLONASE) nasal spray 100 mcg 100 mcg at 03/28/22 0458   • diphenhydrAMINE-zinc acetate (BENADRYL ITCH) topical cream     • potassium chloride SA (Kdur) tablet 40 mEq 40 mEq at 03/28/22 0458   • acetaminophen (TYLENOL) tablet 1,000 mg 1,000 mg at 03/27/22 0923   • senna-docusate (PERICOLACE or SENOKOT S) 8.6-50 MG per tablet 2 Tablet 2 Tablet at 03/26/22 0453    And   • polyethylene glycol/lytes (MIRALAX) PACKET 1 Packet      And   • magnesium hydroxide (MILK OF MAGNESIA) suspension 30 mL      And   • bisacodyl (DULCOLAX) suppository 10 mg     • albuterol (PROVENTIL) 2.5mg/0.5ml nebulizer solution 2.5 mg     • ARIPiprazole (Abilify) tablet 30 mg 30 mg at 03/27/22 1650   • atorvastatin (LIPITOR) tablet 20 mg 20 mg at 03/27/22 2025   • budesonide-formoterol (SYMBICORT) 160-4.5 MCG/ACT inhaler 2 Puff 2 Puff at 03/28/22 0458   • famotidine (PEPCID) tablet 40 mg 40 mg at 03/28/22 0459   • montelukast (SINGULAIR) tablet 10 mg 10 mg at 03/27/22 2025   • nitroglycerin (NITROSTAT) tablet 0.4 mg     • MD Alert...Warfarin per Pharmacy     • spironolactone (ALDACTONE) tablet 25 mg 25 mg at 03/28/22 0458   • tiotropium (Spiriva Respimat) 2.5 mcg/Act inhalation spray 5 mcg 5 mcg at 03/27/22 1130   • warfarin (COUMADIN) tablet 6 mg 6 mg at 03/27/22 1650       ASSESSMENT/PLAN:    46 y.o. female with a history of KATHIA, COPD (3L baseline), Afib (warfarin) admitted for worsening lower extremity swelling      #HFpEF  - 46-year-old female with increasing lower extremity edema and difficulty walking. Previous echo 2/27/2022 showed EF 70% w/ borderline  elevated RVSP of 35 mmHg and concentric LVH.    - On admission her proBNP is slightly elevated.  She has significant edema in the bilateral lower extremities.  - Admitted for diuresis               Spironolactone 25mg daily              Switching to PO Lasix on 3/26, decreasing leg swelling                   #Morbid Obesity, BMI 53  #Deconditioned   - Patient w/ limited mobility 2/2 body habitus and leg swelling  - Lives in an apartment in [place] with daughter currently  - PT/OT - plan for SNF placement   - Social consult - assisting with placement      #Venous stasis  - Patient with bilateral lower extremity erythema consistent with chronic venous stasis.  - Tender on exam, erythema appears to be improving compared to yesterday's exam  - Low suspicion for DVT as patient therapeutic on Warfarin     #COPD  #Chronic hypoxic respiratory failure   - Hx of COPD, baseline 2-3L at home and CPAP at night  - Albuterol nebulized versus MDI every 4 hours as needed  - Symbicort 2 puffs 2 times daily  - Singulair 10 mg p.o. daily     #Atrial fibrillation  - Patient w/ history of Afib  - Continue warfarin, dosing per pharmacy   - INR of 2.25 this am     #Schizophrenia  - Patient w/ history of schizophrenia  - Initially not responsive to questions on admission but conversational today  - Difficulty reconciling medications   -Continue aripiprazole 30 mg daily  -Hold sedating medications including gabapentin and trazodone       #Elevated troponin  - Bumped troponin of 31 then 28, no significant EKG changes  - History not consistent w/ ACS  - Likely 2/2 heart strain, demand ischemia   - No further workup at this time        PLAN  - PT/OT recommend placement - patient likely will only qualify for home health  - Social to help with placement  - Medically stable for discharge         Edison Mclaughlin, DO  PGY-3  [facility]

## 2022-03-28 NOTE — CARE PLAN
Problem: Knowledge Deficit - Standard  Goal: Patient and family/care givers will demonstrate understanding of plan of care, disease process/condition, diagnostic tests and medications  Outcome: Progressing     Problem: Skin Integrity  Goal: Skin integrity is maintained or improved  Outcome: Progressing     Problem: Fall Risk  Goal: Patient will remain free from falls  Outcome: Progressing   The patient is Stable - Low risk of patient condition declining or worsening    Shift Goals  Clinical Goals: monitor O2, rest, OOB to chair for meals  Patient Goals: rest, control pain  Family Goals: ARAM    Progress made toward(s) clinical / shift goals:  Patient OOB to chair for meals, patient ambulated to bathroom, pain under control    Patient is not progressing towards the following goals:

## 2022-03-28 NOTE — PROGRESS NOTES
Report received. Assumed care. Pt in bed awake, irritable about visiting hours, educated on visiting policy, situation desculated. A/O x 4. VSS. Responds appropriately. Denies pain, denies SOB on 3L NC. Assessment complete. C/O numbness in bottoms of feet, baseline per pt. Discussed POC, pt verbalizes understanding. Explained importance of calling before getting OOB. Call light and belongings within reach. Bed alarm on. Bed in the lowest position. Treaded socks in place. Hourly rounding in progress. Will continue to monitor.

## 2022-03-28 NOTE — CARE PLAN
The patient is Stable - Low risk of patient condition declining or worsening    Shift Goals  Clinical Goals: monitor o2, rest, safety, pain control  Patient Goals: rest  Family Goals: ARAM    Progress made toward(s) clinical / shift goals:    Problem: Knowledge Deficit - Standard  Goal: Patient and family/care givers will demonstrate understanding of plan of care, disease process/condition, diagnostic tests and medications  Outcome: Progressing     Problem: Skin Integrity  Goal: Skin integrity is maintained or improved  Outcome: Progressing     Problem: Pain - Standard  Goal: Alleviation of pain or a reduction in pain to the patient’s comfort goal  Outcome: Progressing     Problem: Fall Risk  Goal: Patient will remain free from falls  Outcome: Progressing       Patient is not progressing towards the following goals:

## 2022-03-28 NOTE — DISCHARGE PLANNING
[facility] Transitional Care Coordination    Referral from:  Dr Mclaughlin  Insurance Provider on Facesheet: Medicaid  Potential Rehab Diagnosis: Debility    Chart review indicates patient may need on going medical management and may have therapy needs to possibly meet inpatient rehab facility criteria with the goal of returning to community.    D/C support: Lives alone in 2nd floor apartment. Per cm, patient receives life skill teaching from [employer] named [employer] with worker Bobo who goes to home daily Mon-Fri for 4-5 hours per day. Bobo provides teaching to patient on cooking, bathing, cleaning, coping skills, etc.      Physiatry consultation forwarded per protocol.     Last Covid test:  N/a    Debility - LE edema. Physiatry to consult, would appreciate updated therapy evals. TCC will follow.     Thank you for the referral.

## 2022-03-28 NOTE — PROGRESS NOTES
Inpatient Anticoagulation Service Note  Date: 3/28/2022  Reason for Anticoagulation: Atrial Fibrillation   [id number] VASc Score: 2  HAS-BLED Score: 0   Hemoglobin Value: (!) 10.8  Hematocrit Value: 38.1  Lab Platelet Value: 208  Target INR: 2.0 to 3.0  INR from last 7 days     Date/Time INR Value    03/28/22 0222 2.19    03/27/22 0442 2.07    03/26/22 0206 2.45    03/25/22 0637 2.45    03/24/22 00:35:01 2.25    03/22/22 2209 2.11        Dose from last 7 days     Date/Time Dose (mg)    03/28/22 1426 6    03/27/22 1129 6    03/26/22 1606 6    03/25/22 1441 6    03/24/22 1322 6    03/23/22 1123 6        Average Dose (mg): TBD (Home Dose: 6 mg daily)  Significant Interactions: Azoles,Statin  Bridge Therapy: No  Reversal Agent Administered: Not Applicable  Comments: INR at goal range today. No new H/H or PLT yet today. No overt bleeding noted per chart review. Warfarin interactions noted and suspect topical azole unlikely to to be clinically relevant.  Will give warfarin 6 mg today. INR with AM labs and likely near term move to QOD draws pending clinical course.    Plan:  Warfarin 6 mg 3/28/22  Education Material Provided?: No (chronic warfarin patient)  Pharmacist suggested discharge dosing: warfarin 6 mg daily    Nader Mullins, PharmD

## 2022-03-28 NOTE — DISCHARGE PLANNING
Diagnosis medical debility . Ongoing medical management as well as therapy need. Anticipate post acute services to facilitate a successful transition to community. Discharge support family. Please consider a PMR referral to assist with plan of care. Medicaid  provider.

## 2022-03-28 NOTE — DISCHARGE PLANNING
CM spoke to care coordinator Candis from [facility] by phone as requested by patient who confirmed company does not have any group homes in community. They are only able to continue providing life skill training for patient.     Referral placed to [facility] as requested and physiatry to consult. [facility] referral in progress.    Referral to [facility] placed. CM spoke to owner of agency, Daisy by phone to confirm referral. Daisy unable to confirm acceptance at this time and asked for call back tomorrow. CM confirmed [facility] does accept [place] Medicaid and provides non-medical and medical services.     CM will continue to follow progress of referrals and dc planning needs.

## 2022-03-29 LAB
HCT VFR BLD AUTO: 33.1 % (ref 37–47)
HGB BLD-MCNC: 9.9 G/DL (ref 12–16)
INR PPP: 2.25 (ref 0.87–1.13)
PROTHROMBIN TIME: 24.2 SEC (ref 12–14.6)

## 2022-03-29 PROCEDURE — 700102 HCHG RX REV CODE 250 W/ 637 OVERRIDE(OP): Performed by: FAMILY MEDICINE

## 2022-03-29 PROCEDURE — 700101 HCHG RX REV CODE 250: Performed by: PHYSICAL MEDICINE & REHABILITATION

## 2022-03-29 PROCEDURE — 700102 HCHG RX REV CODE 250 W/ 637 OVERRIDE(OP): Performed by: PHYSICAL MEDICINE & REHABILITATION

## 2022-03-29 PROCEDURE — A9270 NON-COVERED ITEM OR SERVICE: HCPCS | Performed by: STUDENT IN AN ORGANIZED HEALTH CARE EDUCATION/TRAINING PROGRAM

## 2022-03-29 PROCEDURE — 700102 HCHG RX REV CODE 250 W/ 637 OVERRIDE(OP): Performed by: STUDENT IN AN ORGANIZED HEALTH CARE EDUCATION/TRAINING PROGRAM

## 2022-03-29 PROCEDURE — 99232 SBSQ HOSP IP/OBS MODERATE 35: CPT | Mod: GC | Performed by: FAMILY MEDICINE

## 2022-03-29 PROCEDURE — 36415 COLL VENOUS BLD VENIPUNCTURE: CPT

## 2022-03-29 PROCEDURE — A9270 NON-COVERED ITEM OR SERVICE: HCPCS | Performed by: PHYSICAL MEDICINE & REHABILITATION

## 2022-03-29 PROCEDURE — 770006 HCHG ROOM/CARE - MED/SURG/GYN SEMI*

## 2022-03-29 PROCEDURE — 99253 IP/OBS CNSLTJ NEW/EST LOW 45: CPT | Performed by: PHYSICAL MEDICINE & REHABILITATION

## 2022-03-29 PROCEDURE — 97165 OT EVAL LOW COMPLEX 30 MIN: CPT

## 2022-03-29 PROCEDURE — A9270 NON-COVERED ITEM OR SERVICE: HCPCS | Performed by: FAMILY MEDICINE

## 2022-03-29 PROCEDURE — 94660 CPAP INITIATION&MGMT: CPT

## 2022-03-29 PROCEDURE — 85018 HEMOGLOBIN: CPT

## 2022-03-29 PROCEDURE — 85610 PROTHROMBIN TIME: CPT

## 2022-03-29 PROCEDURE — 97530 THERAPEUTIC ACTIVITIES: CPT

## 2022-03-29 PROCEDURE — 85014 HEMATOCRIT: CPT

## 2022-03-29 PROCEDURE — 94640 AIRWAY INHALATION TREATMENT: CPT

## 2022-03-29 RX ORDER — SELENIUM SULFIDE 2.5 MG/100ML
LOTION TOPICAL
Status: DISCONTINUED | OUTPATIENT
Start: 2022-03-29 | End: 2022-03-29

## 2022-03-29 RX ORDER — GABAPENTIN 100 MG/1
100 CAPSULE ORAL EVERY EVENING
Status: DISCONTINUED | OUTPATIENT
Start: 2022-03-29 | End: 2022-03-30 | Stop reason: HOSPADM

## 2022-03-29 RX ORDER — LIDOCAINE 50 MG/G
1 PATCH TOPICAL EVERY 24 HOURS
Status: DISCONTINUED | OUTPATIENT
Start: 2022-03-29 | End: 2022-03-30 | Stop reason: HOSPADM

## 2022-03-29 RX ORDER — SELENIUM SULFIDE 2.5 MG/100ML
LOTION TOPICAL
Status: DISCONTINUED | OUTPATIENT
Start: 2022-03-29 | End: 2022-03-30 | Stop reason: HOSPADM

## 2022-03-29 RX ADMIN — SELENIUM SULFIDE: 2.5 LOTION TOPICAL at 19:53

## 2022-03-29 RX ADMIN — MONTELUKAST 10 MG: 10 TABLET, FILM COATED ORAL at 19:54

## 2022-03-29 RX ADMIN — BUDESONIDE AND FORMOTEROL FUMARATE DIHYDRATE 2 PUFF: 160; 4.5 AEROSOL RESPIRATORY (INHALATION) at 17:28

## 2022-03-29 RX ADMIN — SENNOSIDES AND DOCUSATE SODIUM 2 TABLET: 50; 8.6 TABLET ORAL at 04:43

## 2022-03-29 RX ADMIN — FUROSEMIDE 40 MG: 40 TABLET ORAL at 16:27

## 2022-03-29 RX ADMIN — ATORVASTATIN CALCIUM 20 MG: 20 TABLET, FILM COATED ORAL at 19:54

## 2022-03-29 RX ADMIN — ARIPIPRAZOLE 30 MG: 15 TABLET ORAL at 17:25

## 2022-03-29 RX ADMIN — SPIRONOLACTONE 25 MG: 25 TABLET, FILM COATED ORAL at 04:43

## 2022-03-29 RX ADMIN — FAMOTIDINE 40 MG: 20 TABLET ORAL at 04:43

## 2022-03-29 RX ADMIN — FUROSEMIDE 40 MG: 40 TABLET ORAL at 04:43

## 2022-03-29 RX ADMIN — FLUTICASONE PROPIONATE 100 MCG: 50 SPRAY, METERED NASAL at 06:00

## 2022-03-29 RX ADMIN — WARFARIN SODIUM 6 MG: 6 TABLET ORAL at 17:26

## 2022-03-29 RX ADMIN — POTASSIUM CHLORIDE 40 MEQ: 1500 TABLET, EXTENDED RELEASE ORAL at 04:43

## 2022-03-29 RX ADMIN — TIOTROPIUM BROMIDE INHALATION SPRAY 5 MCG: 3.12 SPRAY, METERED RESPIRATORY (INHALATION) at 08:54

## 2022-03-29 RX ADMIN — KETOCONAZOLE: 20 CREAM TOPICAL at 06:00

## 2022-03-29 RX ADMIN — BUDESONIDE AND FORMOTEROL FUMARATE DIHYDRATE 2 PUFF: 160; 4.5 AEROSOL RESPIRATORY (INHALATION) at 06:00

## 2022-03-29 RX ADMIN — LIDOCAINE 1 PATCH: 50 PATCH TOPICAL at 12:07

## 2022-03-29 RX ADMIN — GABAPENTIN 100 MG: 100 CAPSULE ORAL at 17:25

## 2022-03-29 ASSESSMENT — FIBROSIS 4 INDEX: FIB4 SCORE: 0.9

## 2022-03-29 ASSESSMENT — COGNITIVE AND FUNCTIONAL STATUS - GENERAL
MOVING FROM LYING ON BACK TO SITTING ON SIDE OF FLAT BED: A LITTLE
MOVING TO AND FROM BED TO CHAIR: A LITTLE
DRESSING REGULAR UPPER BODY CLOTHING: A LITTLE
STANDING UP FROM CHAIR USING ARMS: A LITTLE
SUGGESTED CMS G CODE MODIFIER DAILY ACTIVITY: CJ
MOBILITY SCORE: 17
TOILETING: A LITTLE
WALKING IN HOSPITAL ROOM: A LITTLE
DRESSING REGULAR LOWER BODY CLOTHING: A LITTLE
CLIMB 3 TO 5 STEPS WITH RAILING: A LOT
DAILY ACTIVITIY SCORE: 20
SUGGESTED CMS G CODE MODIFIER MOBILITY: CK
TURNING FROM BACK TO SIDE WHILE IN FLAT BAD: A LITTLE
HELP NEEDED FOR BATHING: A LITTLE

## 2022-03-29 ASSESSMENT — GAIT ASSESSMENTS
DISTANCE (FEET): 400
DEVIATION: INCREASED BASE OF SUPPORT;DECREASED HEEL STRIKE;DECREASED TOE OFF
GAIT LEVEL OF ASSIST: SUPERVISED
ASSISTIVE DEVICE: 4 WHEEL WALKER

## 2022-03-29 ASSESSMENT — PAIN DESCRIPTION - PAIN TYPE
TYPE: ACUTE PAIN
TYPE: ACUTE PAIN

## 2022-03-29 ASSESSMENT — ACTIVITIES OF DAILY LIVING (ADL): TOILETING: INDEPENDENT

## 2022-03-29 NOTE — DISCHARGE PLANNING
Per PT note, patient ambulated 400ft supervised with a 4WW. Patient's choice is to dc home with hh, discussed with cm. TCC will no longer follow, please call with any questions [phone].

## 2022-03-29 NOTE — THERAPY
"Physical Therapy   Daily Treatment     Patient Name: Lorene Haro  Age:  46 y.o., Sex:  female  Medical Record #: [id number]  Today's Date: 3/29/2022     Precautions  Precautions: Fall Risk    Assessment    Pt received in bed and agreeable to PT treatment. Pt reports improved LE swelling and no longer c/o pain with legs in a dependent position. Pt was able to perform sup>sit with min A, reporting her bed at home his much easier. Pt transferred and ambulated 400ft with a 4WW and supervision to [facility]. Pt had no losses of balance or knee buckling. Pt reports she would like to return home. From a PT standpoint, pt is safe to return home with HHPT and continued support from her caregivers. Will continue to follow for acute PT until goals met.    Plan    Continue current treatment plan.    DC Equipment Recommendations: None  Discharge Recommendations: Recommend home health for continued physical therapy services      Subjective    \"I don't want to go to rehab. I feel like I can go home.\"     Objective       03/29/22 1331   Precautions   Precautions Fall Risk   Vitals   Vitals Comments VSS and pt asymptomatic at baseline.   Pain 0 - 10 Group   Therapist Pain Assessment 0   Cognition    Level of Consciousness Alert   Comments Pleasant and cooperative. Safety awareness good.   Balance   Sitting Balance (Static) Good   Sitting Balance (Dynamic) Fair +   Standing Balance (Static) Fair +   Standing Balance (Dynamic) Fair +   Weight Shift Sitting Good   Weight Shift Standing Good   Comments Pt uses 4WW for mobility at baseline and during this session. Also able to safely take steps without AD.   Gait Analysis   Gait Level Of Assist Supervised   Assistive Device 4 Wheel Walker   Distance (Feet) 400   # of Times Distance was Traveled 1   Deviation Increased Base Of Support;Decreased Heel Strike;Decreased Toe Off  (inc trunk sway due to body habitus)   Skilled Intervention Verbal Cuing   Comments Cues for proximity to the 4WW and " energy conservation, taking standing breaks as needed.   Bed Mobility    Supine to Sit Minimal Assist   Scooting Supervised   Functional Mobility   Sit to Stand Supervised   Bed, Chair, Wheelchair Transfer Supervised   Transfer Method Stand Step   Mobility with 4WW   How much difficulty does the patient currently have...   Turning over in bed (including adjusting bedclothes, sheets and blankets)? 3   Sitting down on and standing up from a chair with arms (e.g., wheelchair, bedside commode, etc.) 3   Moving from lying on back to sitting on the side of the bed? 3   How much help from another person does the patient currently need...   Moving to and from a bed to a chair (including a wheelchair)? 3   Need to walk in a hospital room? 3   Climbing 3-5 steps with a railing? 2   6 clicks Mobility Score 17   Activity Tolerance   Sitting in Chair left up in chair   Comments Pt tolerated long distance ambulation without difficulty.   Short Term Goals    Short Term Goal # 1 supine to/from sit EOB supervision without use of rails, HOB flat, in 6 visits   Goal Outcome # 1 goal not met   Short Term Goal # 2 sit to stand from chair, EOB and toilet with supervision in 6 visits   Goal Outcome # 2 Goal met   Short Term Goal # 3 ambulation with 4ww 100 feet SBA in 6 visits   Goal Outcome # 3 Goal met   Anticipated Discharge Equipment and Recommendations   DC Equipment Recommendations None   Discharge Recommendations Recommend home health for continued physical therapy services   Interdisciplinary Plan of Care Collaboration   IDT Collaboration with  Nursing;Occupational Therapist   Patient Position at End of Therapy Seated;Chair Alarm On;Call Light within Reach;Tray Table within Reach;Phone within Reach   Collaboration Comments ellen BROWN   Session Information   Date / Session Number  3/29- 2(2/3, 3/30)

## 2022-03-29 NOTE — DISCHARGE PLANNING
Per physiatry patient is a good candidate for IPR pending therapy evals. Will need to submit to insurance once available. TCC will follow.

## 2022-03-29 NOTE — CARE PLAN
The patient is Stable - Low risk of patient condition declining or worsening    Shift Goals  Clinical Goals: remain free from falls  Patient Goals: Discharge home soon  Family Goals: ARAM    Progress made toward(s) clinical / shift goals:    Problem: Knowledge Deficit - Standard  Goal: Patient and family/care givers will demonstrate understanding of plan of care, disease process/condition, diagnostic tests and medications  Outcome: Progressing     Problem: Skin Integrity  Goal: Skin integrity is maintained or improved  Outcome: Progressing     Problem: Pain - Standard  Goal: Alleviation of pain or a reduction in pain to the patient’s comfort goal  Outcome: Progressing     Problem: Fall Risk  Goal: Patient will remain free from falls  Outcome: Progressing       Patient is not progressing towards the following goals:

## 2022-03-29 NOTE — PROGRESS NOTES
Inpatient Anticoagulation Service Note  Date: 3/29/2022  Reason for Anticoagulation: Atrial Fibrillation   [id number] VASc Score: 2  HAS-BLED Score: 0   Hemoglobin Value: (!) 9.9  Hematocrit Value: (!) 33.1  Lab Platelet Value: 208  Target INR: 2.0 to 3.0  INR from last 7 days     Date/Time INR Value    03/29/22 0759 2.25    03/28/22 0222 2.19    03/27/22 0442 2.07    03/26/22 0206 2.45    03/25/22 0637 2.45    03/24/22 00:35:01 2.25    03/22/22 2209 2.11        Dose from last 7 days     Date/Time Dose (mg)    03/29/22 1525 6    03/28/22 1426 6    03/27/22 1129 6    03/26/22 1606 6    03/25/22 1441 6    03/24/22 1322 6    03/23/22 1123 6        Average Dose (mg): TBD (Home Dose: 6 mg daily)  Significant Interactions: Azoles,Statin  Bridge Therapy: No   Reversal Agent Administered: Not Applicable  Comments: INR at goal range today. H/H low. No new PLT yet today. No overt bleeding ntoed per chart review but notes concern for possible blood in stool. Warfarin interactions noted. Will give warfarin 6 mg today. INR tomorrow AM.    Plan:  Warfarin 6 mg 3/29/22  Education Material Provided?: No (chronic warfarin patient)  Pharmacist suggested discharge dosing: warfarin 6 mg daily    Nader Mullins, PharmD

## 2022-03-29 NOTE — CONSULTS
"    Physical Medicine and Rehabilitation Consultation              Date of initial consultation: 3/29/2022  Requested by: Edison Mclaughlin DO  Consulting physician: Parul Mesa D.O.  Reason for consultation: assessment of rehabilitation needs  LOS: 6 Day(s)    Chief complaint: difficulty walking with pain in legs    This history was prepared after interviewing the patient and reviewing the patient's chart at [facility].    HPI: The patient is a 46 y.o. female with a past medical history of COPD using 3L NC at home and chronic tobacco use, obstructive sleep apnea, diastolic heart failure, atrial fibrillation on coumadin;  who presented on 3/22/2022  9:34 PM with acute, moderate chest pain x5 hours and found to have fluid overload from pulmonary hypertension. She also had dysuria, coughing, and weakness in the bilateral lower limbs. Initial troponin was elevated at twenty-five it was repeated after 2-hours and remains elevated.     Physiatry was consulted to assess the patient's rehabilitation needs.    Today, patient reports: legs have improved but very painful (L>R). She reports she is meeting with her outpatient  to assess living situation as patient feels more comfortable with more frequent caregiver times.     Goals for rehabilitation include:  improving independence and pain, reducing caregiver burden, and returning home safely    Social and functional history:  Prior to this hospitalization, patient was independent with ADLS and mobility without an assistive device. Patient lives in a studio on the second floor with access by elevator.    Tobacco: ++ no smoking since admisison    Current function during therapy include:  Restrictions: none  PT: Functional mobility   Cognition    Cognition / Consciousness X   Orientation Level Not Oriented to Reason   Level of Consciousness Alert   New Learning Impaired   Attention Impaired   Comments pt with mild confusion, repeats \"why am " I here?'   Strength Lower Body   Lower Body Strength  X   Gross Strength Generalized Weakness, Equal Bilaterally   Comments adequate for sit to stand and ambulation short distances with 4ww   Balance Assessment   Sitting Balance (Static) Fair +   Sitting Balance (Dynamic) Fair   Standing Balance (Static) Fair   Standing Balance (Dynamic) Fair -   Weight Shift Sitting Fair   Weight Shift Standing Poor   Comments increased pain in standing/weight bearing in L LE   Gait Analysis   Gait Level Of Assist Contact Guard Assist   Assistive Device 4 Wheel Walker   Distance (Feet) 6   # of Times Distance was Traveled 2   Deviation Increased Base Of Support;Decreased Heel Strike;Decreased Toe Off;Bradykinetic   Bed Mobility    Supine to Sit Minimal Assist   Sit to Supine Moderate Assist   Functional Mobility   Sit to Stand Contact Guard Assist   Bed, Chair, Wheelchair Transfer Minimal Assist   Mobility bed mobility, sit to stand from bed, chair, short ambulation in room   How much difficulty does the patient currently have...   Turning over in bed (including adjusting bedclothes, sheets and blankets)? 1   Sitting down on and standing up from a chair with arms (e.g., wheelchair, bedside commode, etc.) 1   Moving from lying on back to sitting on the side of the bed? 1   How much help from another person does the patient currently need...   Moving to and from a bed to a chair (including a wheelchair)? 3   Need to walk in a hospital room? 3   Climbing 3-5 steps with a railing? 2   6 clicks Mobility Score 11   Activity Tolerance   Sitting in Chair 5 min   Sitting Edge of Bed 5 min   Standing 3 min     OT: Activities of daily living  pending    PMH:  Past Medical History:   Diagnosis Date   • Asthma    • Bipolar 2 disorder (HCC)    • Chickenpox    • Hypertension    • On home oxygen therapy    • Other psychological stress    • Schizophrenic disorder (HCC)    • Yeast infection of the skin 4/1/2021       PSH:  Past Surgical History:    Procedure Laterality Date   • CHOLECYSTECTOMY     • COLECTOMY     • HYSTERECTOMY LAPAROSCOPY     • KNEE ARTHROSCOPY Left        FHX:  Family History   Problem Relation Age of Onset   • No Known Problems Mother    • Cancer Sister    Cancer also in grandmother    Medications:  Current Facility-Administered Medications   Medication Dose   • ketoconazole (NIZORAL) 2 % cream     • furosemide (LASIX) tablet 40 mg  40 mg   • fluticasone (FLONASE) nasal spray 100 mcg  2 Spray   • diphenhydrAMINE-zinc acetate (BENADRYL ITCH) topical cream     • potassium chloride SA (Kdur) tablet 40 mEq  40 mEq   • acetaminophen (TYLENOL) tablet 1,000 mg  1,000 mg   • senna-docusate (PERICOLACE or SENOKOT S) 8.6-50 MG per tablet 2 Tablet  2 Tablet    And   • polyethylene glycol/lytes (MIRALAX) PACKET 1 Packet  1 Packet    And   • magnesium hydroxide (MILK OF MAGNESIA) suspension 30 mL  30 mL    And   • bisacodyl (DULCOLAX) suppository 10 mg  10 mg   • albuterol (PROVENTIL) 2.5mg/0.5ml nebulizer solution 2.5 mg  2.5 mg   • ARIPiprazole (Abilify) tablet 30 mg  30 mg   • atorvastatin (LIPITOR) tablet 20 mg  20 mg   • budesonide-formoterol (SYMBICORT) 160-4.5 MCG/ACT inhaler 2 Puff  2 Puff   • famotidine (PEPCID) tablet 40 mg  40 mg   • montelukast (SINGULAIR) tablet 10 mg  10 mg   • nitroglycerin (NITROSTAT) tablet 0.4 mg  0.4 mg   • MD Alert...Warfarin per Pharmacy     • spironolactone (ALDACTONE) tablet 25 mg  25 mg   • tiotropium (Spiriva Respimat) 2.5 mcg/Act inhalation spray 5 mcg  5 mcg   • warfarin (COUMADIN) tablet 6 mg  6 mg       Review of systems:  Constitutional: denies fevers and chills  Eyes: denies change in vision  Ears, nose, mouth, throat: denies sore throat  Cardiovascular: denies palpitations  Respiratory: denies respiratory discomfort  Musculoskeletal: admits to pain in lower back - reports chronic issues  Integumentary: denies pressure ulcer  Neurological: denies spasms, admits to burning and numbness in bilateral feet,  "worse on right, denies headaches, admits to weakness in arms / legs  Psychiatric: denies change in mood  Endocrine: denies diabetes mellitus  Hematologic/lymphatic: denies history of blood disorders  Allergic/immunologic: has allergies to medications as listed below    Allergies:  Allergies   Allergen Reactions   • Penicillin G Rash and Itching     pt reports that she gets a rash all over her body and gets itchy   • Amoxicillin Rash and Itching     Tolerates cephalosporins, pt reports that she gets a rash all over her body and gets itchy       Physical Exam:  Vitals: /61   Pulse 64   Temp 36.2 °C (97.1 °F) (Temporal)   Resp 18   Ht 1.6 m (5' 3\")   Wt (!) 138 kg (304 lb 10.8 oz)   SpO2 94%   General: well-groomed in no acute distress, no visitors present  Eyes: no scleral icterus or conjunctival injection  Ears, nose, mouth and throat: moist oral mucosa  Cardiovascular: good peripheral perfusion  Respiratory: breathing comfortably without use of accessory muscles, +NC  Gastrointestinal: soft, nontender, nondistended, increased abdominal adiposity  Musculoskeletal: good symmetry in bilateral shoulders  Skin: flaking skin on face    Neurologic:  Mental status: answers questions appropriately, follows commands, alert  Speech: fluent, no aphasia or dysarthria  Motor:    Upper Extremity  Myotome R L   Shoulder flexion C5 5/5 5/5   Elbow flexion C5 5/5 5/5   Wrist extension C6 5/5 5/5   Elbow extension C7 5/5 5/5   Finger flexion C8 5/5 5/5   Finger abduction T1 5/5 5/5     Lower Extremity Myotome R L   Hip flexion L2 3*/5 5/5   Knee extension L3 3*/5 5/5   Ankle dorsiflexion L4 5/5 5/5   Toe extension L5 5/5 5/5   Ankle plantarflexion S1 5/5 5/5     Sensory: Impaired sensation in stocking like distrubution in legs     No clonus at bilateral ankles  Tone: no spasticity noted  Psychiatric: appropriate affect  Hematologic/lymphatic/immunologic: ++IV access, no bruises seen on exposed skin    Labs: Reviewed and " significant for   Recent Labs     03/27/22  0442 03/28/22  0222 03/29/22  0759   HEMOGLOBIN  --   --  9.9*   HEMATOCRIT  --   --  33.1*   PROTHROMBTM 22.7* 23.7* 24.2*   INR 2.07* 2.19* 2.25*     Recent Labs     03/28/22  0222   SODIUM 139   POTASSIUM 4.5   CHLORIDE 96   CO2 34*   GLUCOSE 124*   BUN 18   CREATININE 0.96   CALCIUM 9.8     Recent Results (from the past 24 hour(s))   PROTHROMBIN TIME    Collection Time: 03/29/22  7:59 AM   Result Value Ref Range    PT 24.2 (H) 12.0 - 14.6 sec    INR 2.25 (H) 0.87 - 1.13   HEMOGLOBIN AND HEMATOCRIT    Collection Time: 03/29/22  7:59 AM   Result Value Ref Range    Hemoglobin 9.9 (L) 12.0 - 16.0 g/dL    Hematocrit 33.1 (L) 37.0 - 47.0 %         ASSESSMENT:  IMPRESSION:  The patient is a 46 y.o. female with a past medical history of COPD using 3L NC at home and chronic tobacco use, obstructive sleep apnea, diastolic heart failure, atrial fibrillation on coumadin;  who presented on 3/22/2022  9:34 PM with acute, moderate chest pain x5 hours and found to have fluid overload from pulmonary hypertension resulting in cardiac debility.    [facility] Code: 0009 - Cardiac  -With acute secondary complications of: impaired ADLs and mobility,   -with chronic conditions of: COPD using 3L NC at home and chronic tobacco use, obstructive sleep apnea, diastolic heart failure, atrial fibrillation on coumadin, likely polyneuropathy  -and:     Medical Complexity:  Anemia  Hyperglycemia, mild    Data points:  Reviewed results of radiology tests  Reviewed clinical lab tests  Reviewed old records    RECOMMENDATIONS:  ##MSK  #Impaired ADLs and mobility: Agree with continuing OT/PT while admitted here.    Patient is currently not functioning at baseline ADL and mobility. Therefore, it is my medical opinion that she would benefit from aggressive therapies in OT and PT in acute inpatient rehabilitation with goal of returning home with intermittent caregiver assistance.    Current barrier to rehabilitation  include:   -Confirmation of final safe disposition - patient working with her care coordinator Candis from [facility] for increasing caregiver assistance.  -Lack of OT evaluation    If patient no longer requires aggressive therapies in in two disciplines, physiatry will reassess postacute rehabilitation needs.    Estimated length of stay: 10-14 days  Anticipated discharge destination: home with intermittent caregiver assistance  Prognosis: good  Code: full resuscitation    #Chronic right hip pain  Ordered lidocaine patch 5% daily  Agree with tylenol 1000mg TID PRN pain    ##NEURO  #Neuropathic pain, chronic, uncontrolled  Ordered gabapentin 100mg QHS - can increase to 300mg QHS if no daytime grogginess as current dose won't be therapeutic  Gabapentin is a high risk medication that requires close monitoring of kidney function - reviewed    ##GI  Last BM: 3/28  Goal of one continent BM daily    ##  Goal of continence and without retention    ##SKIN  #Dandruff on face and scalp  Selsun blue x 3 nights and monitor for improvement    DVT chemoprophlaxis: warfarin (also with afib)   Code: full resuscitation    Thank you for allowing us to participate in the care of this patient. Physiatry will continue to follow and provide recommendations, as needed.    Patient was seen for 55 minutes on unit/floor of which > 50% of time was spent on counseling and coordination of care regarding the above, including prognosis, risk reduction, benefits of treatment, and options for next stage of care.    Parul Mesa D.O.   Physical Medicine and Rehabilitation     I have performed a physical exam and reviewed and updated history and plan today (3/29/2022).     Please note that this dictation was created using voice recognition software. I have made every reasonable attempt to correct obvious errors, but there may be errors of grammar and possibly content that I did not discover before finalizing the note.

## 2022-03-29 NOTE — DISCHARGE PLANNING
CM received call from Lavern  at [facility] [phone] to discuss dc plan. Patient is receiving assistance for rent at her studio apartment and life skills training through agency contracted by [facility] due to an intellectual disability. Lavern states there is opportunity to obtain a 24 hour home (SLA) for patient due to patient requiring more non-skilled assistance for ADL's than is provided in her current arrangement. Patient will have to be medically cleared, however she can go to 24 hour home if she is needing PT/OT and will be transported to these appointments as an outpatient.     NEREIDA will continue to follow patient progress and contact Lavern by phone later today to update on patient's medical status.

## 2022-03-29 NOTE — THERAPY
"Occupational Therapy   Initial Evaluation     Patient Name: Lorene Haro  Age:  46 y.o., Sex:  female  Medical Record #: [id number]  Today's Date: 3/29/2022     Precautions  Precautions: (P) Fall Risk    Assessment  Patient is 46 y.o. female admitted for BLE swelling, fluid overload, COPD. Pt normally independent with basic ADLs and functional mobility with 4WW living in a 2nd floor apartment with elevator access alone. Pt able to complete functional mobility and ADLs with supervision, pt required some assist for toileting due to size of bathroom but patient states she has more room and able to complete without assistance. Pt seems to be at her functional baseline, will recommend home health therapy at discharge, patient eager to discharge home as soon as possible.     Plan    Recommend Occupational Therapy for Evaluation only.    DC Equipment Recommendations: (P) None  Discharge Recommendations: (P) Recommend home health for continued occupational therapy services     Subjective    \"I want to go home, not rehab\"     Objective       03/29/22 1340   Prior Living Situation   Prior Services Continuous (24 Hour) Care Giving Family   Housing / Facility 1 Story Apartment / Condo   Steps Into Home 15   Steps In Home 0   Elevator Yes   Bathroom Set up Bathtub / Shower Combination;Tub Transfer Bench;Grab Bars   Equipment Owned 4-Wheel Walker;Tub / Shower Seat;Grab Bar(s) In Tub / Shower   Lives with - Patient's Self Care Capacity Alone and Able to Care For Self   Prior Level of ADL Function   Self Feeding Independent   Grooming / Hygiene Independent   Bathing Independent   Dressing Independent   Toileting Independent   Prior Level of IADL Function   Medication Management Requires Assist   Laundry Requires Assist   Kitchen Mobility Requires Assist   Finances Requires Assist   Home Management Requires Assist   Shopping Requires Assist   Prior Level Of Mobility Supervision With Device in Home   History of Falls   History of " Falls No   Precautions   Precautions Fall Risk   Pain 0 - 10 Group   Therapist Pain Assessment Post Activity Pain Same as Prior to Activity;Nurse Notified   Non Verbal Descriptors   Non Verbal Scale  Calm   Cognition    Cognition / Consciousness WDL   Orientation Level Oriented x 4   Comments Pleasant, cooperative, receptive to therapy   Active ROM Upper Body   Active ROM Upper Body  WDL   Strength Upper Body   Upper Body Strength  WDL   Balance Assessment   Sitting Balance (Static) Good   Sitting Balance (Dynamic) Fair +   Standing Balance (Static) Fair +   Standing Balance (Dynamic) Fair +   Weight Shift Sitting Good   Weight Shift Standing Good   Comments w/ 4WW   Bed Mobility    Supine to Sit Supervised   Scooting Supervised   ADL Assessment   Grooming Supervision;Standing   Upper Body Dressing Supervision   Lower Body Dressing Supervision   Toileting Supervision   How much help from another person does the patient currently need...   Putting on and taking off regular lower body clothing? 3   Bathing (including washing, rinsing, and drying)? 3   Toileting, which includes using a toilet, bedpan, or urinal? 3   Putting on and taking off regular upper body clothing? 3   Taking care of personal grooming such as brushing teeth? 4   Eating meals? 4   6 Clicks Daily Activity Score 20   Functional Mobility   Sit to Stand Supervised   Bed, Chair, Wheelchair Transfer Supervised   Toilet Transfers Supervised   Transfer Method Stand Step   Mobility bed mobility, bathroom mobility, left in care of PT   Comments w/ 4WW   Activity Tolerance   Sitting in Chair left seated in chair   Sitting Edge of Bed 5 min   Standing 3 min   Education Group   Education Provided Role of Occupational Therapist   Role of Occupational Therapist Patient Response Patient;Acceptance;Explanation   Problem List   Problem List None   Anticipated Discharge Equipment and Recommendations   DC Equipment Recommendations None   Discharge Recommendations  Recommend home health for continued occupational therapy services   Interdisciplinary Plan of Care Collaboration   IDT Collaboration with  Nursing;Physical Therapist   Patient Position at End of Therapy Seated;Chair Alarm On;Call Light within Reach;Tray Table within Reach;Phone within Reach   Collaboration Comments RN updated

## 2022-03-29 NOTE — PROGRESS NOTES
[facility] FAMILY MEDICINE PROGRESS NOTE     Attending:   Liz Gold MD    Resident:   Edison Mclaughlin DO    PATIENT:   Lorene Haro; [id number]; 1975    ID:   46 y.o. female with a history of KATHIA, COPD (3L baseline), Afib (warfarin) admitted for worsening lower extremity swelling        SUBJECTIVE:   Overnight, nursing noted bright red blood on toilet paper after wiping. Denies any other GI bleeding. Denies hx of GI bleeds. No dizziness, hypotension     OBJECTIVE:  Vitals:    03/28/22 1600 03/28/22 1800 03/28/22 2054 03/29/22 0435   BP: 114/61 115/40 122/56 123/54   Pulse: 62 61 69 61   Resp: 18 (!) 22 18 18   Temp: 36.8 °C (98.3 °F) 37.1 °C (98.8 °F) 36.1 °C (97 °F) 37.2 °C (98.9 °F)   TempSrc: Temporal Temporal Temporal Temporal   SpO2: 96% 95% 94% 94%   Weight:       Height:           Intake/Output Summary (Last 24 hours) at 3/29/2022 0646  Last data filed at 3/28/2022 1800  Gross per 24 hour   Intake 1720 ml   Output --   Net 1720 ml       PHYSICAL EXAM:  General: No acute distress, afebrile, deconditioned  HEENT: NC/AT. EOMI. NC in place  Cardiovascular: RRR without murmurs. Normal capillary refill   Respiratory: Diffusely diminished anteriorly   Abdomen: soft, mild LUQ tenderness on deep palpation  EXT:  PERKINS, edema and erythema improving  Skin: No erythema/lesions   Neuro: Non-focal    LABS:  No results for input(s): WBC, RBC, HEMOGLOBIN, HEMATOCRIT, MCV, MCH, RDW, PLATELETCT, MPV, NEUTSPOLYS, LYMPHOCYTES, MONOCYTES, EOSINOPHILS, BASOPHILS, RBCMORPHOLO in the last 72 hours.  Recent Labs     03/28/22 0222   SODIUM 139   POTASSIUM 4.5   CHLORIDE 96   CO2 34*   BUN 18   CREATININE 0.96   CALCIUM 9.8     Estimated GFR/CRCL = Estimated Creatinine Clearance: 98 mL/min (by C-G formula based on SCr of 0.96 mg/dL).  Recent Labs     03/28/22 0222   GLUCOSE 124*     Recent Labs     03/27/22  0442 03/28/22  0222   INR 2.07* 2.19*             Recent Labs     03/27/22  0442 03/28/22  0222   INR 2.07* 2.19*          IMAGING:  DX-CHEST-PORTABLE (1 VIEW)   Final Result      Stable cardiomegaly.          MEDS:  Current Facility-Administered Medications   Medication Last Admin   • ketoconazole (NIZORAL) 2 % cream Given at 03/29/22 0600   • furosemide (LASIX) tablet 40 mg 40 mg at 03/29/22 0443   • fluticasone (FLONASE) nasal spray 100 mcg 100 mcg at 03/29/22 0600   • diphenhydrAMINE-zinc acetate (BENADRYL ITCH) topical cream     • potassium chloride SA (Kdur) tablet 40 mEq 40 mEq at 03/29/22 0443   • acetaminophen (TYLENOL) tablet 1,000 mg 1,000 mg at 03/27/22 0923   • senna-docusate (PERICOLACE or SENOKOT S) 8.6-50 MG per tablet 2 Tablet 2 Tablet at 03/29/22 0443    And   • polyethylene glycol/lytes (MIRALAX) PACKET 1 Packet      And   • magnesium hydroxide (MILK OF MAGNESIA) suspension 30 mL      And   • bisacodyl (DULCOLAX) suppository 10 mg     • albuterol (PROVENTIL) 2.5mg/0.5ml nebulizer solution 2.5 mg     • ARIPiprazole (Abilify) tablet 30 mg 30 mg at 03/28/22 1714   • atorvastatin (LIPITOR) tablet 20 mg 20 mg at 03/28/22 2054   • budesonide-formoterol (SYMBICORT) 160-4.5 MCG/ACT inhaler 2 Puff 2 Puff at 03/29/22 0600   • famotidine (PEPCID) tablet 40 mg 40 mg at 03/29/22 0443   • montelukast (SINGULAIR) tablet 10 mg 10 mg at 03/28/22 2055   • nitroglycerin (NITROSTAT) tablet 0.4 mg     • MD Alert...Warfarin per Pharmacy     • spironolactone (ALDACTONE) tablet 25 mg 25 mg at 03/29/22 0443   • tiotropium (Spiriva Respimat) 2.5 mcg/Act inhalation spray 5 mcg 5 mcg at 03/28/22 0731   • warfarin (COUMADIN) tablet 6 mg 6 mg at 03/28/22 1714       ASSESSMENT/PLAN:      46 y.o. female with a history of KATHIA, COPD (3L baseline), Afib (warfarin) admitted for worsening lower extremity swelling       #GI bleed?  - Blood on toilet paper this am, covered toilet paper per nursing  - No hx of GI bleed, no evidence of hemodynamic instability at this time  - H&H stat, will do rectal exam today      #HFpEF  - 46-year-old female with  increasing lower extremity edema and difficulty walking. Previous echo 2/27/2022 showed EF 70% w/ borderline elevated RVSP of 35 mmHg and concentric LVH.    - On admission her proBNP is slightly elevated.  She has significant edema in the bilateral lower extremities.  - Admitted for diuresis               Spironolactone 25mg daily              Switching to PO Lasix on 3/26, decreasing leg swelling                   #Morbid Obesity, BMI 53  #Deconditioned   - Patient w/ limited mobility 2/2 body habitus and leg swelling  - Lives in an apartment in [place] with daughter currently  - PT/OT - plan for SNF placement    PM&R to see patient today  - Social consult - assisting with placement      #Venous stasis  - Patient with bilateral lower extremity erythema consistent with chronic venous stasis.  - Tender on exam, erythema appears to be improving compared to yesterday's exam  - Low suspicion for DVT as patient therapeutic on Warfarin     #COPD  #Chronic hypoxic respiratory failure   - Hx of COPD, baseline 2-3L at home and CPAP at night  - Albuterol nebulized versus MDI every 4 hours as needed  - Symbicort 2 puffs 2 times daily  - Singulair 10 mg p.o. daily     #Atrial fibrillation  - Patient w/ history of Afib  - Continue warfarin, dosing per pharmacy   - INR of 2.25 this am     #Schizophrenia  - Patient w/ history of schizophrenia  - Initially not responsive to questions on admission but conversational today  - Difficulty reconciling medications   -Continue aripiprazole 30 mg daily  -Hold sedating medications including gabapentin and trazodone       #Elevated troponin  - Bumped troponin of 31 then 28, no significant EKG changes  - History not consistent w/ ACS  - Likely 2/2 heart strain, demand ischemia   - No further workup at this time        PLAN  - PT/OT recommend placement  - PM&R consult placed  - H&H pending  - Social to help with placement  - Medically stable for discharge            Edison Mclaughlin, DO  PGY-3  [employer]  Family Medicine

## 2022-03-30 ENCOUNTER — PHARMACY VISIT (OUTPATIENT)
Dept: PHARMACY | Facility: MEDICAL CENTER | Age: 47
End: 2022-03-30
Payer: COMMERCIAL

## 2022-03-30 VITALS
HEIGHT: 63 IN | DIASTOLIC BLOOD PRESSURE: 60 MMHG | HEART RATE: 60 BPM | OXYGEN SATURATION: 96 % | WEIGHT: 293 LBS | SYSTOLIC BLOOD PRESSURE: 119 MMHG | RESPIRATION RATE: 18 BRPM | TEMPERATURE: 97.9 F | BODY MASS INDEX: 51.91 KG/M2

## 2022-03-30 PROBLEM — E87.70 FLUID EXCESS: Status: RESOLVED | Noted: 2022-03-23 | Resolved: 2022-03-30

## 2022-03-30 PROCEDURE — 99238 HOSP IP/OBS DSCHRG MGMT 30/<: CPT | Mod: GC | Performed by: FAMILY MEDICINE

## 2022-03-30 PROCEDURE — A9270 NON-COVERED ITEM OR SERVICE: HCPCS | Performed by: STUDENT IN AN ORGANIZED HEALTH CARE EDUCATION/TRAINING PROGRAM

## 2022-03-30 PROCEDURE — RXMED WILLOW AMBULATORY MEDICATION CHARGE: Performed by: STUDENT IN AN ORGANIZED HEALTH CARE EDUCATION/TRAINING PROGRAM

## 2022-03-30 PROCEDURE — 700102 HCHG RX REV CODE 250 W/ 637 OVERRIDE(OP): Performed by: STUDENT IN AN ORGANIZED HEALTH CARE EDUCATION/TRAINING PROGRAM

## 2022-03-30 PROCEDURE — 94660 CPAP INITIATION&MGMT: CPT

## 2022-03-30 RX ORDER — KETOCONAZOLE 20 MG/G
1 CREAM TOPICAL DAILY
Qty: 60 G | Refills: 0 | Status: SHIPPED | OUTPATIENT
Start: 2022-03-31 | End: 2022-08-26

## 2022-03-30 RX ORDER — FUROSEMIDE 40 MG/1
40 TABLET ORAL 2 TIMES DAILY
Qty: 60 TABLET | Refills: 1 | Status: SHIPPED | OUTPATIENT
Start: 2022-03-30 | End: 2022-04-06

## 2022-03-30 RX ORDER — SELENIUM SULFIDE 2.5 MG/100ML
LOTION TOPICAL
Qty: 118 ML | Refills: 3 | Status: SHIPPED | OUTPATIENT
Start: 2022-03-30 | End: 2022-08-26

## 2022-03-30 RX ORDER — SPIRONOLACTONE 25 MG/1
25 TABLET ORAL DAILY
Qty: 30 TABLET | Refills: 1 | Status: SHIPPED | OUTPATIENT
Start: 2022-03-31 | End: 2022-08-26

## 2022-03-30 RX ADMIN — BUDESONIDE AND FORMOTEROL FUMARATE DIHYDRATE 2 PUFF: 160; 4.5 AEROSOL RESPIRATORY (INHALATION) at 04:49

## 2022-03-30 RX ADMIN — POTASSIUM CHLORIDE 40 MEQ: 1500 TABLET, EXTENDED RELEASE ORAL at 04:49

## 2022-03-30 RX ADMIN — KETOCONAZOLE: 20 CREAM TOPICAL at 04:50

## 2022-03-30 RX ADMIN — FUROSEMIDE 40 MG: 40 TABLET ORAL at 04:50

## 2022-03-30 RX ADMIN — FLUTICASONE PROPIONATE 100 MCG: 50 SPRAY, METERED NASAL at 04:50

## 2022-03-30 RX ADMIN — FAMOTIDINE 40 MG: 20 TABLET ORAL at 04:49

## 2022-03-30 RX ADMIN — SPIRONOLACTONE 25 MG: 25 TABLET, FILM COATED ORAL at 04:49

## 2022-03-30 RX ADMIN — ACETAMINOPHEN 1000 MG: 500 TABLET ORAL at 11:43

## 2022-03-30 RX ADMIN — Medication: at 08:00

## 2022-03-30 RX ADMIN — TIOTROPIUM BROMIDE INHALATION SPRAY 5 MCG: 3.12 SPRAY, METERED RESPIRATORY (INHALATION) at 08:57

## 2022-03-30 ASSESSMENT — PAIN DESCRIPTION - PAIN TYPE: TYPE: ACUTE PAIN

## 2022-03-30 NOTE — CARE PLAN
The patient is Stable - Low risk of patient condition declining or worsening    Shift Goals  Clinical Goals: pt will remain free from falls  Patient Goals: discharge home soon  Family Goals: NA    Progress made toward(s) clinical / shift goals: Assess patient's fall risk. Implement fall precautions. Educate patient on proper use of the call light. Ensure patient's bed is locked in lowest position, call light within reach.     Patient is not progressing towards the following goals:

## 2022-03-30 NOTE — DISCHARGE INSTRUCTIONS
You were seen in the Emergency Department for lower extremity swelling.    EKG, labs, chest xray were completed without significant acute abnormalities.  Heart enzymes were mildly elevated however stable from prior.    Please use 1,000mg of tylenol or 600mg of ibuprofen every 6 hours as needed for pain.    Please start taking diuretics again and follow-up immediately with your primary care physician.  Elevate your legs as much as possible.  We will also place you on some antibiotics in case of developing skin infection.    Please follow up with your primary care physician.    Return to the Emergency Department with chest pain, trouble breathing, fainting, fevers, or other concerns.      Pulmonary Edema  Pulmonary edema is unusual buildup of fluid in the lungs. It makes it hard for a person to breathe. This condition is an emergency.  Follow these instructions at home:  Medicines  · Take over-the-counter and prescription medicines only as told by your doctor.  · If you were prescribed an antibiotic medicine, take it as told by your doctor. Do not stop taking the antibiotic even if you start to feel better.  · Ask your doctor to help you write a plan with information about each medicine you take. This may include:  ? Why you are taking it.  ? Possible side effects.  ? Best time of day to take it.  ? Foods to take with it, or foods to avoid.  ? When to stop taking it.  · Make a list of each medicine, vitamin, or herbal supplement you take.  ? Keep the list with you at all times.  ? Show the list to your doctor at each visit and before starting a new medicine.  ? Keep the list up to date.  Lifestyle    · Do regular exercise as told by your doctor. It is important to do it safely. You can do this by:  ? Asking your doctor what exercises and activities are good and safe for you to do.  ? Pacing your activities. This will prevent shortness of breath or chest pain.  ? Resting for at least 1 hour before and after  meals.  ? Asking about cardiac rehabilitation programs. These may include:  ? Education.  ? Exercise plans.  ? Counseling.  · Eat a heart-healthy diet that is low in salt, saturated fat, and cholesterol. Your doctor may suggest foods that are high in fiber, such as:  ? Fresh fruits and vegetables.  ? Whole grains.  ? Beans.  · Do not use any products that contain nicotine or tobacco, such as cigarettes and e-cigarettes. If you need help quitting, ask your doctor.  General instructions  · Keep a record of your weight.  ? Record your hospital or clinic weight. When you get home, compare it to your scale and record your weight.  ? Weigh yourself first thing in the morning each day, and record the weights. You should weigh yourself every morning after you pee and before you eat breakfast. Wear the same amount of clothing each time you weigh yourself.  ? Share your weight record with your doctor. These can help your doctor see if your body is holding extra fluid.  ? Tell your doctor right away if you have gained weight quickly, or if you have gained weight as told by your doctor. Your medicines may need to be adjusted.  · Check your blood pressure as often as told by your doctor.  ? [facility]  ? Record your blood pressure readings. Bring them with you for your clinic visits.  · Stay at a healthy weight. Ask your doctor what weight is healthy for you.  · Think about doing therapy or being a part of a support group.  · Keep all follow-up visits as told by your doctor. This is important.  Contact a doctor if:  · You have questions about your medicines.  · You miss a dose of your medicine.  Get help right away if:  · You have very bad chest pain, especially if the pain is crushing or pressure-like and spreads to the arms, back, neck, or jaw.  · You have more swelling in your hands, feet, ankles, or belly (abdomen).  · You feel sick to your stomach (nauseous).  · You have strange  sweating.  · Your skin turns blue or pale.  · Your shortness of breath gets worse.  · You feel dizzy or unsteady.  · Your vision is blurry.  · You have a headache.  · You cough up bloody split.  · You cannot sleep because it is hard to breathe.  · You start to feel a “jumping” or “fluttering” sensation (palpitations) in the chest that is unusual for you.  · You feel like you cannot get enough air.  · You gain weight rapidly.  These symptoms may be an emergency. Do not wait to see if the symptoms will go away. Get medical help right away. Call your local emergency services (911 in the [place] Do not drive yourself to the hospital.  Summary  · Pulmonary edema is unusual fluid buildup in the lungs that can make it hard to breathe.  · This condition is an emergency.  · Keep a record of your weight. Call your doctor if you gain weight rapidly.  This information is not intended to replace advice given to you by your health care provider. Make sure you discuss any questions you have with your health care provider.  Document Released: 12/06/2010 Document Revised: 11/30/2018 Document Reviewed: 03/20/2018  [employer] Patient Education © 2020 [employer]    Discharge Instructions    Discharged to home by car with friend. Discharged via wheelchair, hospital escort: Yes.  Special equipment needed: Not Applicable    Be sure to schedule a follow-up appointment with your primary care doctor or any specialists as instructed.     Discharge Plan:   Diet Plan: Discussed  Activity Level: Discussed  Confirmed Follow up Appointment: Patient to Call and Schedule Appointment  Confirmed Symptoms Management: Discussed  Medication Reconciliation Updated: Yes  Influenza Vaccine Indication: Patient Refuses    I understand that a diet low in cholesterol, fat, and sodium is recommended for good health. Unless I have been given specific instructions below for another diet, I accept this instruction as my diet prescription.   Other diet:  Regular    Special Instructions: None    · Is patient discharged on Warfarin / Coumadin?   No     Depression / Suicide Risk    As you are discharged from this [facility] facility, it is important to learn how to keep safe from harming yourself.    Recognize the warning signs:  · Abrupt changes in personality, positive or negative- including increase in energy   · Giving away possessions  · Change in eating patterns- significant weight changes-  positive or negative  · Change in sleeping patterns- unable to sleep or sleeping all the time   · Unwillingness or inability to communicate  · Depression  · Unusual sadness, discouragement and loneliness  · Talk of wanting to die  · Neglect of personal appearance   · Rebelliousness- reckless behavior  · Withdrawal from people/activities they love  · Confusion- inability to concentrate     If you or a loved one observes any of these behaviors or has concerns about self-harm, here's what you can do:  · Talk about it- your feelings and reasons for harming yourself  · Remove any means that you might use to hurt yourself (examples: pills, rope, extension cords, firearm)  · Get professional help from the community (Mental Health, Substance Abuse, psychological counseling)  · Do not be alone:Call your Safe Contact- someone whom you trust who will be there for you.  · Call your local CRISIS HOTLINE 369-0992 or 838-209-0623  · Call your local Children's Mobile Crisis Response Team [place] (422) 385-4119 or www.[employer]  · Call the toll free National Suicide Prevention Hotlines   · National Suicide Prevention Lifeline 210-206-DXAZ (4958)  · National Hope Line Network 800-SUICIDE (325-1083)

## 2022-03-30 NOTE — PROGRESS NOTES
[facility] FAMILY MEDICINE PROGRESS NOTE     Attending:   Liz Gold MD    Resident:   Edison Mclaughlin DO    PATIENT:   Lorene Haro; [id number]; 1975    ID:   46 y.o. female with a history of KATHIA, COPD (3L baseline), Afib (warfarin) admitted for worsening lower extremity swelling     SUBJECTIVE:   No acute events overnight, patient slept well overnight. No complaints of pain this am    OBJECTIVE:  Vitals:    03/29/22 1551 03/29/22 1955 03/30/22 0430 03/30/22 0713   BP: 127/68 119/63 122/64 119/60   Pulse: 65 66 60    Resp: 19 18 18    Temp: 36.3 °C (97.4 °F) 36.7 °C (98 °F) 36.6 °C (97.8 °F) 36.6 °C (97.9 °F)   TempSrc: Temporal Temporal Temporal Temporal   SpO2: 96% 95% 93% 96%   Weight:       Height:           Intake/Output Summary (Last 24 hours) at 3/30/2022 0800  Last data filed at 3/29/2022 2000  Gross per 24 hour   Intake 1200 ml   Output --   Net 1200 ml       PHYSICAL EXAM:  General: No acute distress, afebrile, resting comfortably, deconditioned   HEENT: NC/AT. EOMI.   Cardiovascular: RRR without murmurs. Normal capillary refill   Respiratory: Diminished throughout anteriorly   Abdomen: soft, nontender, nondistended, no masses  EXT:  PERKINS, mild edema with erythema, same as yesterday's exam  Skin: see EXT  Neuro: Non-focal    LABS:  Recent Labs     03/29/22 0759   HEMOGLOBIN 9.9*   HEMATOCRIT 33.1*     Recent Labs     03/28/22 0222   SODIUM 139   POTASSIUM 4.5   CHLORIDE 96   CO2 34*   BUN 18   CREATININE 0.96   CALCIUM 9.8     Estimated GFR/CRCL = Estimated Creatinine Clearance: 98 mL/min (by C-G formula based on SCr of 0.96 mg/dL).  Recent Labs     03/28/22 0222   GLUCOSE 124*     Recent Labs     03/28/22 0222 03/29/22 0759   INR 2.19* 2.25*             Recent Labs     03/28/22 0222 03/29/22 0759   INR 2.19* 2.25*         IMAGING:  DX-CHEST-PORTABLE (1 VIEW)   Final Result      Stable cardiomegaly.          MEDS:  Current Facility-Administered Medications   Medication Last Admin   • gabapentin  (NEURONTIN) capsule 100 mg 100 mg at 03/29/22 1725   • lidocaine (LIDODERM) 5 % 1 Patch 1 Patch at 03/29/22 1207   • selenium sulfide 2.5% (SELSUN) lotion Given at 03/29/22 1953   • ketoconazole (NIZORAL) 2 % cream Given at 03/30/22 0450   • furosemide (LASIX) tablet 40 mg 40 mg at 03/30/22 0450   • fluticasone (FLONASE) nasal spray 100 mcg 100 mcg at 03/30/22 0450   • diphenhydrAMINE-zinc acetate (BENADRYL ITCH) topical cream     • potassium chloride SA (Kdur) tablet 40 mEq 40 mEq at 03/30/22 0449   • acetaminophen (TYLENOL) tablet 1,000 mg 1,000 mg at 03/27/22 0923   • senna-docusate (PERICOLACE or SENOKOT S) 8.6-50 MG per tablet 2 Tablet 2 Tablet at 03/29/22 0443    And   • polyethylene glycol/lytes (MIRALAX) PACKET 1 Packet      And   • magnesium hydroxide (MILK OF MAGNESIA) suspension 30 mL      And   • bisacodyl (DULCOLAX) suppository 10 mg     • albuterol (PROVENTIL) 2.5mg/0.5ml nebulizer solution 2.5 mg     • ARIPiprazole (Abilify) tablet 30 mg 30 mg at 03/29/22 1725   • atorvastatin (LIPITOR) tablet 20 mg 20 mg at 03/29/22 1954   • budesonide-formoterol (SYMBICORT) 160-4.5 MCG/ACT inhaler 2 Puff 2 Puff at 03/30/22 0449   • famotidine (PEPCID) tablet 40 mg 40 mg at 03/30/22 0449   • montelukast (SINGULAIR) tablet 10 mg 10 mg at 03/29/22 1954   • nitroglycerin (NITROSTAT) tablet 0.4 mg     • MD Alert...Warfarin per Pharmacy     • spironolactone (ALDACTONE) tablet 25 mg 25 mg at 03/30/22 0449   • tiotropium (Spiriva Respimat) 2.5 mcg/Act inhalation spray 5 mcg 5 mcg at 03/29/22 0854   • warfarin (COUMADIN) tablet 6 mg 6 mg at 03/29/22 1726       ASSESSMENT/PLAN:      46 y.o. female with a history of KATHIA, COPD (3L baseline), Afib (warfarin) admitted for worsening lower extremity swelling         #HFpEF  - 46-year-old female with increasing lower extremity edema and difficulty walking. Previous echo 2/27/2022 showed EF 70% w/ borderline elevated RVSP of 35 mmHg and concentric LVH.    - On admission her proBNP is  slightly elevated.  She has significant edema in the bilateral lower extremities.  - Admitted for diuresis               Spironolactone 25mg daily              Switching to PO Lasix on 3/26, decreasing leg swelling                   #Morbid Obesity, BMI 53  #Deconditioned   - Patient w/ limited mobility 2/2 body habitus and leg swelling  - Lives in an apartment in [place] with daughter currently  - Social consult - assisting with placement    PM&R believe patient would benefit from acute rehab   PT/OT recommend home health   Will discuss with social today regarding d/c planning     #Venous stasis  - Patient with bilateral lower extremity erythema consistent with chronic venous stasis.  - Tender on exam, erythema appears to be improving compared to yesterday's exam  - Low suspicion for DVT as patient therapeutic on Warfarin     #COPD  #Chronic hypoxic respiratory failure   - Hx of COPD, baseline 2-3L at home and CPAP at night  - Albuterol nebulized versus MDI every 4 hours as needed  - Symbicort 2 puffs 2 times daily  - Singulair 10 mg p.o. daily     #Atrial fibrillation  - Patient w/ history of Afib  - Continue warfarin, dosing per pharmacy   - INR of 2.25 this am     #Schizophrenia  - Patient w/ history of schizophrenia  - Initially not responsive to questions on admission but conversational today  - Difficulty reconciling medications   -Continue aripiprazole 30 mg daily  -Hold sedating medications including gabapentin and trazodone       #Elevated troponin  - Bumped troponin of 31 then 28, no significant EKG changes  - History not consistent w/ ACS  - Likely 2/2 heart strain, demand ischemia   - No further workup at this time        PLAN  - Medically clear for discharge  - Placement of rehab vs. Home health - will discuss with social today      Edison Mclaughlin DO  PGY-3  [facility]

## 2022-03-30 NOTE — DISCHARGE PLANNING
Anticipated Discharge Disposition: Home with home health     Action: pt currently on 3 liters O2, 6 clicks are 18/11. Orientation: A&Ox4. Patient is medically cleared for discharge. CM placed call to CHERYL Puckett at [phone] to discuss plan for patient discharge to home and transportation. Message left for return call.     CM spoke to Phyllis from [employer]  at [phone] yesterday afternoon who stated she does accept [facility] but currently does not have staff to accomodate patient.      Barriers to Discharge: None     Plan: Follow up with [facility] CM to confirm all needs are arranged prior to patient transitioning home today.     Update: 12:00  CM received call back from Lavern, patient's [facility] CM who stated patient's service company, [employer] is unable to see patient due to staffing issues until next Tuesday and believes it would be unsafe discharge to send patient home without services due to patient's history of being unable to care for herself independently at home. CM updated MD and nurse by [employer].    Update: 13:30  Patient is stating to nurse she will leave AMA and has family to provide transportation home. This CM called CHERYL Puckett and verified the patient is a competent adult and does not have guardianship. Patient has right to leave AMA if she wishes. [facility] states she will speak with patient by phone to explain to patient, she will not have any services in her home until Tuesday, 4/5 and must assume self care. MD and nurse aware.

## 2022-03-30 NOTE — PROGRESS NOTES
Received report from NOC RN and assumed care of pt. Pt is A&Ox4 resting in bedside chair. Pt reports 4/10 back pain, medicated per MAR. POC discussed with pt; all questions answered. No other needs at this time. Bed locked in lowest position, call light within reach, pt educated to call for assistance.

## 2022-03-30 NOTE — DISCHARGE SUMMARY
[facility] MEDICINE DISCHARGE SUMMARY     PATIENT ID:  Name:             Lorene Haro   YOB: 1975  Age:                 46 y.o.  female   MRN:               [id number]  Address:         [address] [place] [address]  Phone:            [phone] (home)    ADMISSION DATE:   3/22/2022    DISCHARGE DATE:   3/30/2022    DISCHARGE DIAGNOSES:   HFpEF  Afib  KATHIA  COPD  Venous stasis     ATTENDING PHYSICIAN:   Liz Gold MD    RESIDENT:   Edison Mclaughlin DO    CONSULTANTS:    PM&R    PROCEDURES:    NOne    IMAGING:   DX-CHEST-PORTABLE (1 VIEW)   Final Result      Stable cardiomegaly.          PHYSICAL EXAM:   General: No acute distress, afebrile, resting comfortably,deconditioned but walking this am  HEENT: NC/AT. EOMI.   Cardiovascular: RRR without murmurs. Normal capillary refill   Respiratory: Diffusely diminished   Abdomen: soft, nontender, nondistended, no masses  EXT:  PERKINS, improving edema and erythema  Skin: See EXT  Neuro: Non-focal    LABS:  Recent Labs     03/29/22  0759   HEMOGLOBIN 9.9*   HEMATOCRIT 33.1*     Recent Labs     03/28/22  0222   SODIUM 139   POTASSIUM 4.5   CHLORIDE 96   CO2 34*   GLUCOSE 124*   BUN 18     Lab Results   Component Value Date/Time    CHOLSTRLTOT 198 05/10/2021 05:42 AM     (H) 05/10/2021 05:42 AM    HDL 48 05/10/2021 05:42 AM    TRIGLYCERIDE 126 05/10/2021 05:42 AM       Lab Results   Component Value Date/Time    TROPONINI 0.01 11/13/2018 07:14 PM       Lab Results   Component Value Date/Time    TROPONINI 0.01 11/13/2018 07:14 PM            HOSPITAL COURSE:   Lorene Haro is a 46 y.o. female with a history of KATHIA, COPD (3 L at baseline), A. fib (on warfarin), admitted for worsening leg swelling and erythema.  Patient lives alone at home and has home health care periodically.    In the ED, noted to have significant leg swelling as well as erythema bilateral legs.  Likely venous stasis rather than cellulitis.  Patient admitted and  diuresed with IV Lasix.  Also placed on spironolactone.  Patient swelling and erythema generally improved and she was placed on p.o. Lasix.     Concerned that patient would not be able to care for self as she was severely deconditioned on arrival and her ability to care for self at home was unclear.  Physical therapy, Occupational Therapy, PM&R evaluated patient.  Initially patient qualified for rehabilitation; however, on day prior to discharge patient was ambulating around the floor, doing well, and met qualifications for home health.    At this time, I believe that the patient is safe to be discharged home.  She will have home health.  She should follow-up with her PCP in 1 to 2 weeks.  She should continue her medications as prescribed.  She should return for any new or worsening complaints    DISCHARGE CONDITION:    Stable    DISPOSITION:   Home w/ home health    DISCHARGE MEDICATIONS:   Current Outpatient Medications   Medication Sig Dispense Refill   • furosemide (LASIX) 40 MG Tab Take 1 Tablet by mouth 2 times a day. 60 Tablet 1   • [START ON 3/31/2022] ketoconazole (NIZORAL) 2 % Cream Apply 1 g topically every day. 60 g 0   • selenium sulfide 2.5% (SELSUN) 2.5 % Lotion Apply to scalp once daily 118 mL 3   • [START ON 3/31/2022] spironolactone (ALDACTONE) 25 MG Tab Take 1 Tablet by mouth every day. 30 Tablet 1   • cephALEXin (KEFLEX) 500 MG Cap Take 2 Capsules by mouth 3 times a day for 7 days. 42 Capsule 0         ACTIVITY:   Normal Activity as Tolerated.    DIET:   Healthy    DISCHARGE INSTRUCTIONS AND FOLLOW UP:  Patient is medically stable for discharge and will be discharged to home w/ home health    Follow Up: Mallika Sharp M.D.      Discharge Instructions:   Patient was instructed to return the ER in the event of worsening symptoms including but not limited to worsening leg swelling, chest pain or any other major concerns. Patient understands that failure to do so may indicate worsening of her medical  condition(s) and result in adverse clinical outcomes including fatality. We have counseled the patient on the importance of compliance and the patient has agreed to proceed with all medical recommendations and follow up plan indicated above. The patient understands that the failure to do so may result in result in adverse clinical outcomes including fatality.       CC:   Mallika Sharp M.D.

## 2022-03-30 NOTE — PROGRESS NOTES
Inpatient Anticoagulation Service Note  Date: 3/30/2022  Reason for Anticoagulation: Atrial Fibrillation   [id number] VASc Score: 2  HAS-BLED Score: 0   Hemoglobin Value: (!) 9.9  Hematocrit Value: (!) 33.1  Lab Platelet Value: 208  Target INR: 2.0 to 3.0  INR from last 7 days     Date/Time INR Value    03/29/22 0759 2.25    03/28/22 0222 2.19    03/27/22 0442 2.07    03/26/22 0206 2.45    03/25/22 0637 2.45    03/24/22 00:35:01 2.25        Dose from last 7 days     Date/Time Dose (mg)    03/30/22 0918 6    03/29/22 1525 6    03/28/22 1426 6    03/27/22 1129 6    03/26/22 1606 6    03/25/22 1441 6    03/24/22 1322 6    03/23/22 1123 6        Average Dose (mg): TBD (Home Dose: 6 mg daily)  Significant Interactions: Azoles,Statin  Bridge Therapy: No  Reversal Agent Administered: Not Applicable  Comments: INR update today pending. No new H/H or PLT today. No overt bleeding ntoed per chart review but noted prior concern for blood in stool. Warfarin interactions noted. Will give warfarin 6 mg today. INR with AM labs and likely every other day to follow.    Plan:  Warfarin 6 mg 3/30/22  Education Material Provided?: No (chronic warfarin patient)  Pharmacist suggested discharge dosing: warfarin 6 mg daily    Nader Mullins, PharmD

## 2022-03-30 NOTE — FACE TO FACE
Face to Face Supporting Documentation - Home Health    The encounter with this patient was in whole or in part the primary reason for home health admission.    Date of encounter:   Patient:                    MRN:                       YOB: 2024  Lorene Haro  [id number]  1975     Home health to see patient for:  Home health aide, Physical Therapy evaluation and treatment and Occupational therapy evaluation and treatment    Skilled need for:  Exacerbation of Chronic Disease State HFpEF, COPD, Debility     Skilled nursing interventions to include:  Wound Care    Homebound status evidenced by:  Needs the assistance of another person in order to leave the home. Leaving home requires a considerable and taxing effort. There is a normal inability to leave the home.    Community Physician to provide follow up care: Mallika Sharp M.D.     Optional Interventions? No      I certify the face to face encounter for this home health care referral meets the CMS requirements and the encounter/clinical assessment with the patient was, in whole, or in part, for the medical condition(s) listed above, which is the primary reason for home health care. Based on my clinical findings: the service(s) are medically necessary, support the need for home health care, and the homebound criteria are met.  I certify that this patient has had a face to face encounter by myself.  Edison Mclaughlin D.O. - NPI: [id number]

## 2022-03-30 NOTE — PROGRESS NOTES
Received bedside report from night shift RN.     Assumed care of patient at change of shift.     Assessment complete and POC discussed with patient and pertinent medical team.     Patient is A&Ox4, VS stable, on 3L NC which is patient's baseline.     Patient is not complaining of pain, and does not show signs of physical distress.     Patient's affect is WNL. She does seem interested in much discussion regarding transition to next level of care and goals for the day.  Does follow commands appropriately.      Patient eating meals independently, and ate them in bed.    Bed is in lowest/locked position.     Call light and belongings are within reach.     All needs addressed and patient has no complaints.

## 2022-03-31 ENCOUNTER — HOSPITAL ENCOUNTER (EMERGENCY)
Facility: MEDICAL CENTER | Age: 47
End: 2022-04-01
Attending: EMERGENCY MEDICINE
Payer: MEDICAID

## 2022-03-31 ENCOUNTER — APPOINTMENT (OUTPATIENT)
Dept: RADIOLOGY | Facility: MEDICAL CENTER | Age: 47
End: 2022-03-31
Attending: EMERGENCY MEDICINE
Payer: MEDICAID

## 2022-03-31 DIAGNOSIS — G47.33 OSA (OBSTRUCTIVE SLEEP APNEA): ICD-10-CM

## 2022-03-31 DIAGNOSIS — J96.11 CHRONIC RESPIRATORY FAILURE WITH HYPOXIA AND HYPERCAPNIA (HCC): ICD-10-CM

## 2022-03-31 DIAGNOSIS — R60.0 LOWER EXTREMITY EDEMA: ICD-10-CM

## 2022-03-31 DIAGNOSIS — M79.89 LEG SWELLING: ICD-10-CM

## 2022-03-31 DIAGNOSIS — J96.12 CHRONIC RESPIRATORY FAILURE WITH HYPOXIA AND HYPERCAPNIA (HCC): ICD-10-CM

## 2022-03-31 LAB
ALBUMIN SERPL BCP-MCNC: 4.4 G/DL (ref 3.2–4.9)
ALBUMIN/GLOB SERPL: 1.4 G/DL
ALP SERPL-CCNC: 128 U/L (ref 30–99)
ALT SERPL-CCNC: 11 U/L (ref 2–50)
ANION GAP SERPL CALC-SCNC: 12 MMOL/L (ref 7–16)
ANISOCYTOSIS BLD QL SMEAR: ABNORMAL
AST SERPL-CCNC: 13 U/L (ref 12–45)
BASOPHILS # BLD AUTO: 0.3 % (ref 0–1.8)
BASOPHILS # BLD: 0.03 K/UL (ref 0–0.12)
BILIRUB SERPL-MCNC: 0.8 MG/DL (ref 0.1–1.5)
BUN SERPL-MCNC: 22 MG/DL (ref 8–22)
CALCIUM SERPL-MCNC: 9.7 MG/DL (ref 8.5–10.5)
CHLORIDE SERPL-SCNC: 98 MMOL/L (ref 96–112)
CK SERPL-CCNC: 52 U/L (ref 0–154)
CO2 SERPL-SCNC: 27 MMOL/L (ref 20–33)
COMMENT 1642: NORMAL
CREAT SERPL-MCNC: 1.03 MG/DL (ref 0.5–1.4)
DACRYOCYTES BLD QL SMEAR: NORMAL
EOSINOPHIL # BLD AUTO: 0.09 K/UL (ref 0–0.51)
EOSINOPHIL NFR BLD: 0.9 % (ref 0–6.9)
ERYTHROCYTE [DISTWIDTH] IN BLOOD BY AUTOMATED COUNT: 53.1 FL (ref 35.9–50)
GFR SERPLBLD CREATININE-BSD FMLA CKD-EPI: 67 ML/MIN/1.73 M 2
GLOBULIN SER CALC-MCNC: 3.1 G/DL (ref 1.9–3.5)
GLUCOSE SERPL-MCNC: 98 MG/DL (ref 65–99)
HCT VFR BLD AUTO: 42.9 % (ref 37–47)
HGB BLD-MCNC: 12.8 G/DL (ref 12–16)
IMM GRANULOCYTES # BLD AUTO: 0.02 K/UL (ref 0–0.11)
IMM GRANULOCYTES NFR BLD AUTO: 0.2 % (ref 0–0.9)
LIPASE SERPL-CCNC: 37 U/L (ref 11–82)
LYMPHOCYTES # BLD AUTO: 2.29 K/UL (ref 1–4.8)
LYMPHOCYTES NFR BLD: 21.8 % (ref 22–41)
MCH RBC QN AUTO: 25 PG (ref 27–33)
MCHC RBC AUTO-ENTMCNC: 29.8 G/DL (ref 33.6–35)
MCV RBC AUTO: 83.8 FL (ref 81.4–97.8)
MICROCYTES BLD QL SMEAR: ABNORMAL
MONOCYTES # BLD AUTO: 0.76 K/UL (ref 0–0.85)
MONOCYTES NFR BLD AUTO: 7.2 % (ref 0–13.4)
MORPHOLOGY BLD-IMP: NORMAL
NEUTROPHILS # BLD AUTO: 7.3 K/UL (ref 2–7.15)
NEUTROPHILS NFR BLD: 69.6 % (ref 44–72)
NRBC # BLD AUTO: 0 K/UL
NRBC BLD-RTO: 0 /100 WBC
OVALOCYTES BLD QL SMEAR: NORMAL
PLATELET # BLD AUTO: 245 K/UL (ref 164–446)
PLATELET BLD QL SMEAR: NORMAL
PMV BLD AUTO: 10.5 FL (ref 9–12.9)
POIKILOCYTOSIS BLD QL SMEAR: NORMAL
POTASSIUM SERPL-SCNC: 4.4 MMOL/L (ref 3.6–5.5)
PROT SERPL-MCNC: 7.5 G/DL (ref 6–8.2)
RBC # BLD AUTO: 5.12 M/UL (ref 4.2–5.4)
RBC BLD AUTO: PRESENT
SCHISTOCYTES BLD QL SMEAR: NORMAL
SODIUM SERPL-SCNC: 137 MMOL/L (ref 135–145)
WBC # BLD AUTO: 10.5 K/UL (ref 4.8–10.8)

## 2022-03-31 PROCEDURE — 80053 COMPREHEN METABOLIC PANEL: CPT

## 2022-03-31 PROCEDURE — 85025 COMPLETE CBC W/AUTO DIFF WBC: CPT

## 2022-03-31 PROCEDURE — 99284 EMERGENCY DEPT VISIT MOD MDM: CPT

## 2022-03-31 PROCEDURE — 36415 COLL VENOUS BLD VENIPUNCTURE: CPT

## 2022-03-31 PROCEDURE — 83690 ASSAY OF LIPASE: CPT

## 2022-03-31 PROCEDURE — 82550 ASSAY OF CK (CPK): CPT

## 2022-03-31 ASSESSMENT — FIBROSIS 4 INDEX: FIB4 SCORE: 0.9

## 2022-03-31 NOTE — PROGRESS NOTES
Pt discharged against medical advise. Pt was told that home health would not be able to come to her house until 4/5/2022, pt still wanting to discharge home without home health. Pt states she will have neighbors check in on her. [id number] delivered to pt bedside. PIV removed. All personal belongings sent with pt. Pt escorted to [facility] with charge JC Neely.

## 2022-04-01 VITALS
BODY MASS INDEX: 53.11 KG/M2 | DIASTOLIC BLOOD PRESSURE: 59 MMHG | OXYGEN SATURATION: 97 % | WEIGHT: 281.31 LBS | SYSTOLIC BLOOD PRESSURE: 119 MMHG | HEART RATE: 65 BPM | RESPIRATION RATE: 13 BRPM | HEIGHT: 61 IN | TEMPERATURE: 97.5 F

## 2022-04-01 PROCEDURE — 93970 EXTREMITY STUDY: CPT

## 2022-04-01 NOTE — ED NOTES
Patient provided discharge instructions. Patient verbalized understanding. Patient leaving ER in stable condition.

## 2022-04-01 NOTE — DISCHARGE INSTRUCTIONS
You were seen for leg swelling, thankfully we see no signs of serious infection take the medications prescribed return immediately if any worse.

## 2022-04-01 NOTE — ED TRIAGE NOTES
"Chief Complaint   Patient presents with   • Leg Swelling     Pt reports she has BLE swelling/ cellulitis. CMS intact       Pt to triage via walker with steady gait for above complaint.     Pt presents in triage with BLE swelling and redness, pt states she was admitted for 9 days and left AMA yesterday. Spoke with  and was told to come back, pt received all medications and discharge instructions. Pt was told to come back and \"be more honest with myself and you guys\" and to \"finish the treatment\".    Pt back to lobby, educated on triage process and encourage to alert staff of any changes.     /57   Pulse 78   Temp 36.2 °C (97.2 °F) (Temporal)   Resp 18   Ht 1.549 m (5' 1\")   Wt (!) 128 kg (281 lb 4.9 oz)   LMP  (LMP Unknown)   SpO2 92%   BMI 53.15 kg/m²     "

## 2022-04-01 NOTE — DISCHARGE PLANNING
GISELE e-mailed the discharge summary and PT/OT notes to [email or web address]    DPA faxed the discharge summary and PT/OT notes to [facility] via manual fax.  Fax #[phone].

## 2022-04-01 NOTE — ED PROVIDER NOTES
ED Provider Note    CHIEF COMPLAINT  Chief Complaint   Patient presents with   • Leg Swelling     Pt reports she has BLE swelling/ cellulitis. CMS intact       HPI    Primary care provider: Mallika Sharp M.D.  Means of arrival: EMS  History obtained from: Patient  History limited by: Nothing    Lorene Haro is a 46 y.o. female who presents with bilateral lower extremity swelling and redness.  She tells me that she was admitted to the hospital over the last week but left AGAINST MEDICAL ADVICE yesterday.  Today she talked to the , she had received discharge medications and instructions she was told to come back for nurse treatment.  Denies any fevers or chills.  No falls or injuries or trauma.  No prior episodes.  Has history of coagulability, on warfarin.  Swelling seemed to be getting slightly better over the last few days but still present with bilateral erythema.    REVIEW OF SYSTEMS  Constitutional: Negative for fever or chills.  Positive for fatigue.  HENT: Negative for rhinorrhea or sore throat.    Respiratory: Negative for cough or shortness of breath.    Cardiovascular: Negative for chest pain or syncope.   Gastrointestinal: Negative for nausea, vomiting, or abdominal pain.   Genitourinary: Negative for dysuria or flank pain.   Musculoskeletal: Negative for back pain positive for lower extremity swelling.  Skin: Negative for itching or open wounds but positive erythema to both lower legs.   Neurological: Negative for sensory or motor changes.   See HPI for further details. All other systems are negative.     PAST MEDICAL HISTORY   has a past medical history of Asthma, Bipolar 2 disorder (HCC), Chickenpox, Hypertension, On home oxygen therapy, Other psychological stress, Schizophrenic disorder (Former[facility]), and Yeast infection of the skin (4/1/2021).    PAST FAMILY HISTORY  Family History   Problem Relation Age of Onset   • No Known Problems Mother    • Cancer Sister        SOCIAL HISTORY  Social  "History     Tobacco Use   • Smoking status: Current Some Day Smoker     Years: 20.00     Types: Cigarettes   • Smokeless tobacco: Never Used   • Tobacco comment:  \"I picked one up last night\"   Vaping Use   • Vaping Use: Never used   Substance and Sexual Activity   • Alcohol use: No   • Drug use: No   • Sexual activity: Not Currently       SURGICAL HISTORY   has a past surgical history that includes hysterectomy laparoscopy; colectomy; cholecystectomy; and knee arthroscopy (Left).    CURRENT MEDICATIONS  Home Medications     Reviewed by Sarah Mcallister R.N. (Registered Nurse) on 03/31/22 at 2019  Med List Status: <None>   Medication Last Dose Status   acetaminophen (TYLENOL) 325 MG Tab  Active   albuterol 108 (90 Base) MCG/ACT Aero Soln inhalation aerosol  Active   aripiprazole (ABILIFY) 30 MG tablet  Active   atorvastatin (LIPITOR) 20 MG Tab  Active   budesonide-formoterol (SYMBICORT) 160-4.5 MCG/ACT Aerosol  Active   famotidine (PEPCID) 40 MG Tab  Active   fluticasone (FLONASE) 50 MCG/ACT nasal spray  Active   furosemide (LASIX) 40 MG Tab  Active   gabapentin (NEURONTIN) 300 MG Cap  Active   ketoconazole (NIZORAL) 2 % Cream  Active   lidocaine (LIDODERM) 5 % Patch  Active   montelukast (SINGULAIR) 10 MG Tab  Active   nitroglycerin (NITROSTAT) 0.4 MG SL Tab  Active   selenium sulfide 2.5% (SELSUN) 2.5 % Lotion  Active   spironolactone (ALDACTONE) 25 MG Tab  Active   tiotropium (SPIRIVA HANDIHALER) 18 MCG Cap  Active   traZODone (DESYREL) 50 MG Tab  Active   warfarin (COUMADIN) 6 MG Tab  Active                ALLERGIES  Allergies   Allergen Reactions   • Penicillin G Rash and Itching     pt reports that she gets a rash all over her body and gets itchy   • Amoxicillin Rash and Itching     Tolerates cephalosporins, pt reports that she gets a rash all over her body and gets itchy       PHYSICAL EXAM  VITAL SIGNS: /59   Pulse 65   Temp 36.4 °C (97.5 °F) (Temporal)   Resp 13   Ht 1.549 m (5' 1\")   Wt (!) 128 " kg (281 lb 4.9 oz)   LMP  (LMP Unknown)   SpO2 97%   BMI 53.15 kg/m²    Pulse ox interpretation: On room air, I interpret this pulse ox as normal.  Constitutional: Well-developed, well-nourished. Sitting up.   HEENT: Normocephalic, atraumatic. Posterior pharynx clear, mucous membranes slightly dry.  Eyes:  EOMI. Normal sclerae.  Neck: Supple, nontender.  Chest/Pulmonary: Slightly diminished to ausculation bilaterally, no wheezes or rhonchi.  Cardiovascular: Regular rate and rhythm, no murmur.   Abdomen: Soft, nontender; no rebound, guarding, or masses.  Back: No CVA or midline tenderness.   Musculoskeletal: 1+ symmetric lower extremity edema.  No deformities.  Neuro: Clear speech, normal coordination, cranial nerves II-XII grossly intact, no focal asymmetry or sensory deficits.   Psych: Normal mood and affect.  Skin: Venous stasis changes to lower legs.  Skin otherwise warm and dry    DIAGNOSTIC STUDIES / PROCEDURES    LABS & EKG  Results for orders placed or performed during the hospital encounter of 03/31/22   CBC WITH DIFFERENTIAL   Result Value Ref Range    WBC 10.5 4.8 - 10.8 K/uL    RBC 5.12 4.20 - 5.40 M/uL    Hemoglobin 12.8 12.0 - 16.0 g/dL    Hematocrit 42.9 37.0 - 47.0 %    MCV 83.8 81.4 - 97.8 fL    MCH 25.0 (L) 27.0 - 33.0 pg    MCHC 29.8 (L) 33.6 - 35.0 g/dL    RDW 53.1 (H) 35.9 - 50.0 fL    Platelet Count 245 164 - 446 K/uL    MPV 10.5 9.0 - 12.9 fL    Neutrophils-Polys 69.60 44.00 - 72.00 %    Lymphocytes 21.80 (L) 22.00 - 41.00 %    Monocytes 7.20 0.00 - 13.40 %    Eosinophils 0.90 0.00 - 6.90 %    Basophils 0.30 0.00 - 1.80 %    Immature Granulocytes 0.20 0.00 - 0.90 %    Nucleated RBC 0.00 /100 WBC    Neutrophils (Absolute) 7.30 (H) 2.00 - 7.15 K/uL    Lymphs (Absolute) 2.29 1.00 - 4.80 K/uL    Monos (Absolute) 0.76 0.00 - 0.85 K/uL    Eos (Absolute) 0.09 0.00 - 0.51 K/uL    Baso (Absolute) 0.03 0.00 - 0.12 K/uL    Immature Granulocytes (abs) 0.02 0.00 - 0.11 K/uL    NRBC (Absolute) 0.00 K/uL     Anisocytosis 1+     Microcytosis 1+    COMP METABOLIC PANEL   Result Value Ref Range    Sodium 137 135 - 145 mmol/L    Potassium 4.4 3.6 - 5.5 mmol/L    Chloride 98 96 - 112 mmol/L    Co2 27 20 - 33 mmol/L    Anion Gap 12.0 7.0 - 16.0    Glucose 98 65 - 99 mg/dL    Bun 22 8 - 22 mg/dL    Creatinine 1.03 0.50 - 1.40 mg/dL    Calcium 9.7 8.5 - 10.5 mg/dL    AST(SGOT) 13 12 - 45 U/L    ALT(SGPT) 11 2 - 50 U/L    Alkaline Phosphatase 128 (H) 30 - 99 U/L    Total Bilirubin 0.8 0.1 - 1.5 mg/dL    Albumin 4.4 3.2 - 4.9 g/dL    Total Protein 7.5 6.0 - 8.2 g/dL    Globulin 3.1 1.9 - 3.5 g/dL    A-G Ratio 1.4 g/dL   LIPASE   Result Value Ref Range    Lipase 37 11 - 82 U/L   CREATINE KINASE   Result Value Ref Range    CPK Total 52 0 - 154 U/L   ESTIMATED GFR   Result Value Ref Range    GFR (CKD-EPI) 67 >60 mL/min/1.73 m 2   PERIPHERAL SMEAR REVIEW   Result Value Ref Range    Peripheral Smear Review see below    PLATELET ESTIMATE   Result Value Ref Range    Plt Estimation Normal    MORPHOLOGY   Result Value Ref Range    RBC Morphology Present     Poikilocytosis 1+     Ovalocytes 1+     Schistocytes 1+     Tear Drop Cells 1+    DIFFERENTIAL COMMENT   Result Value Ref Range    Comments-Diff see below        RADIOLOGY  US-EXTREMITY VENOUS LOWER BILAT   Final Result          COURSE & MEDICAL DECISION MAKING    This is a 46 y.o. female who presents with bilateral lower extremity swelling.    Differential Diagnosis includes but is not limited to:  DVT, cellulitis, venous stasis, electrolyte abnormality    ED Course:  46-year-old female with above concerning presentation.  Plan labs and ultrasound to evaluate for any complications.    Thankfully, work-up today reassuring the patient has no fevers white count is normal, electrolytes are stable with normal kidney function, no acidosis.  Ultrasound shows no obvious clot.  Patient is hemodynamically stable think she is safe for discharge, return immediately if any worse, continue home  medications.    Medications - No data to display    FINAL IMPRESSION  1. Leg swelling    2. Chronic respiratory failure with hypoxia and hypercapnia (HCC)    3. KATHIA (obstructive sleep apnea)    4. Lower extremity edema        PRESCRIPTIONS  Discharge Medication List as of 4/1/2022 12:55 AM          FOLLOW UP  Mallika Sharp M.D.  [facility]   [address]  [facility] [address]  [phone]          [facility], Emergency Dept  [address] [id number] [phone]  Today  If you have ANY new or worse symptoms!      -DISCHARGE-       Results, exam findings, clinical impression, presumed diagnosis, treatment options, and strict return precautions were discussed with the patient, and they verbalized understanding, agreed with, and appreciated the plan of care.    Pertinent Labs & Imaging studies reviewed and verified by myself, as well as nursing notes and the patient's past medical, family, and social histories (See chart for details)    Portions of this record were made with voice recognition software.  Despite my review, spelling/grammar/context errors may still remain.  Interpretation of this chart should be taken in this context.    Electronically signed by Nate Gloria M.D. on 4/5/2022 at 8:22 AM.

## 2022-04-01 NOTE — DISCHARGE PLANNING
Agency/Facility Name: [facility]  Outcome: DPA faxed the discharge summary and PT/OT notes via manual fax.  Fax #[phone].

## 2022-04-05 NOTE — PROGRESS NOTES
"Chief Complaint   Patient presents with   • Atrial Fibrillation     Follow up          Subjective:   Lorene Haro is a 46 y.o. female who presents today for follow-up with her caregiver, Bobo.    Patient of Dr. Borrego.  Current medical problems include COPD, KATHIA (not treated), obesity, paroxysmal atrial fibrillation, history of alcohol dependence, drug use, tobacco use.      I first met her 1 year ago when she was hospitalized for acute hypoxic respiratory failure at which time she was diagnosed with atrial fibrillation and started on warfarin.  At our appointments I have reviewed the telemetry monitoring EKGs and did not see any signs of atrial fibrillation therefore I ordered a Zio patch to monitor for arrhythmias, this has not been performed yet.  She remains on warfarin.    Sia has had frequent trips to the ER/hospital in the last year since I saw her. Mostly for breathing problems and leg pain. Her last cigarette was 3 or 4 days ago. She is not currently short of breath, she is not wearing oxygen. Her caregiver is concerned about her because when she checks on Sia early in the morning while she is sleeping Sia's color is \"us\" and she has irregular breathing pattern. Sia is not currently using a CPAP machine or O2 at night.     Sia sometimes feels dizzy when standing up or when walking. Occurs about 1-2 times a day. Drinks water during the day, about 64oz per day.     Sometimes she feels her heart racing, 5-10secs. She has not bleeding complications.     Past Medical History:   Diagnosis Date   • Asthma    • Bipolar 2 disorder (HCC)    • Chickenpox    • Hypertension    • On home oxygen therapy    • Other psychological stress    • Schizophrenic disorder (HCC)    • Yeast infection of the skin 4/1/2021         Past Surgical History:   Procedure Laterality Date   • CHOLECYSTECTOMY     • COLECTOMY     • HYSTERECTOMY LAPAROSCOPY     • KNEE ARTHROSCOPY Left          Family History   Problem " Relation Age of Onset   • No Known Problems Mother    • Cancer Sister          Social History     Tobacco Use   Smoking Status Former Smoker   • Years: 20.00   • Types: Cigarettes   • Start date: 4/4/2022   Smokeless Tobacco Never Used          Social History     Substance and Sexual Activity   Alcohol Use No         Allergies   Allergen Reactions   • Penicillin G Rash and Itching     pt reports that she gets a rash all over her body and gets itchy   • Amoxicillin Rash and Itching     Tolerates cephalosporins, pt reports that she gets a rash all over her body and gets itchy         Outpatient Encounter Medications as of 4/6/2022   Medication Sig Dispense Refill   • [DISCONTINUED] furosemide (LASIX) 40 MG Tab Take 1 Tablet by mouth every day. 90 Tablet 3   • ketoconazole (NIZORAL) 2 % Cream Apply 1 g topically every day. 60 g 0   • selenium sulfide 2.5% (SELSUN) 2.5 % Lotion Apply to scalp once daily 118 mL 3   • spironolactone (ALDACTONE) 25 MG Tab Take 1 Tablet by mouth every day. 30 Tablet 1   • lidocaine (LIDODERM) 5 % Patch Place 1 Patch on the skin every 24 hours. 10 Patch 0   • gabapentin (NEURONTIN) 300 MG Cap Take 1 Capsule by mouth 3 times a day. 60 Capsule 0   • fluticasone (FLONASE) 50 MCG/ACT nasal spray ADMOINISTER 1 TO 2 SPRAYS INTO AFFECTED NOSTRIL(S) TWICE DAILY 16 g 3   • montelukast (SINGULAIR) 10 MG Tab TAKE 1 TABLET BY MOUTH EVERY NIGHT AT BEDTIME 90 Tablet 3   • budesonide-formoterol (SYMBICORT) 160-4.5 MCG/ACT Aerosol Inhale 2 Puffs 2 times a day. 3 Each 3   • tiotropium (SPIRIVA HANDIHALER) 18 MCG Cap Place 1 Capsule into inhaler and inhale every day. 30 Capsule 3   • warfarin (COUMADIN) 6 MG Tab Take one to one and one-half (1-1.5) tablets daily as directed by [facility] (Patient taking differently: Take 6 mg by mouth every evening.) 135 Tablet 1   • acetaminophen (TYLENOL) 325 MG Tab Take 325 mg by mouth every 8 hours as needed (Generalized pain). Prescription is for 4  "tabs every 8 hours prn, patient states she takes 2 tabls every 4 hours prn     • atorvastatin (LIPITOR) 20 MG Tab Take 20 mg by mouth at bedtime.     • traZODone (DESYREL) 50 MG Tab Take 100 mg by mouth at bedtime.     • famotidine (PEPCID) 40 MG Tab Take 40 mg by mouth every day. Indications: Heartburn     • nitroglycerin (NITROSTAT) 0.4 MG SL Tab Place 1 tablet under the tongue as needed for Chest Pain. 25 tablet 0   • albuterol 108 (90 Base) MCG/ACT Aero Soln inhalation aerosol Inhale 2 Puffs every 6 hours as needed for Shortness of Breath. 8.5 g 0   • aripiprazole (ABILIFY) 30 MG tablet Take 30 mg by mouth every morning.     • [DISCONTINUED] furosemide (LASIX) 40 MG Tab Take 1 Tablet by mouth 2 times a day. 60 Tablet 1     No facility-administered encounter medications on file as of 4/6/2022.         Review of Systems   Constitutional: Positive for malaise/fatigue.   Respiratory: Negative for cough and shortness of breath.    Cardiovascular: Positive for palpitations and leg swelling. Negative for chest pain and orthopnea.   Gastrointestinal: Negative for blood in stool and melena.   Neurological: Positive for dizziness. Negative for loss of consciousness.          Objective:   /60 (BP Location: Left arm, Patient Position: Sitting)   Pulse 70   Resp 16   Ht 1.575 m (5' 2\")   Wt (!) 131 kg (288 lb)   LMP  (LMP Unknown)   SpO2 96%   BMI 52.68 kg/m²        Physical Exam  Constitutional:       Appearance: Normal appearance. She is obese.      Comments: Lethargic, intermittently sleeping during exam   Neck:      Comments: Large neck circumference, unable to visualize JVD  Cardiovascular:      Rate and Rhythm: Normal rate and regular rhythm.      Heart sounds: No murmur heard.  Pulmonary:      Breath sounds: No wheezing or rales.   Abdominal:      Comments: Large abdominal circumference   Musculoskeletal:      Comments: 2+pitting edema in bilateral pretibia area   Skin:     General: Skin is warm.      " Findings: Erythema present.      Comments: Scaling dry skin with erythema on lower legs. No open wounds.   Neurological:      Mental Status: She is alert.      Comments: Ambulates with walker   Psychiatric:         Behavior: Behavior normal.           TTE 2/27/22  CONCLUSIONS  Compared to the prior echo on 03/20/21, left ventricular systolic   function is now hyperdynamic with Doppler study showing mild aortic   stenosis.  The left ventricle is mildly dilated.  Mild concentric left ventricular hypertrophy.  Hyperdynamic left ventricular systolic function with estimated ejection   fraction 70%.  No regional wall motion abnormality noted.  Evidence of increased intracavitary gradient with peak velocity 2.6   m/sec.  Aortic valve is not well visualized.  Mild aortic stenosis based on Doppler. Transvalvular gradients are -   Peak:  27 mmHg, Mean: 14 mmHg.  Estimated right ventricular systolic pressure is 35 mmHg.    Telemetry review:   10/12/21 monitor strip approx 4sec of irregular narrow complex rhythm   10/12/21: 4 sec of very fast (230bpm) regular NCT 149am  10/11/21 235am 20sec NCT  Assessment:     1. Paroxysmal atrial fibrillation (HCC)  Holter Monitor Study    CANCELED: [facility] ZIO PATCH MONITOR   2. Chronic anticoagulation     3. Chronic respiratory failure with hypoxia and hypercapnia (HCC)     4. Morbid obesity (HCC)     5. Venous stasis dermatitis of both lower extremities  Referral to Wound Clinic   6. KATHIA (obstructive sleep apnea)          Medical Decision Making:  Today's Assessment / Plan:   COPD  - follows with pulm med.    KATHIA  - currently not treated. She is falling asleep in our office visit today, she has frequent daytime somnolence.  Her caregiver witnesses apneic events early in the morning.  I also reviewed her telemetry strips which shows very brief tachycardic events in the hospital, mostly at night  -Highly recommend obtaining a working CPAP machine.  I will message her pulmonary provider as  well.    Venous stasis  - furosemide 40mg daily, echo actually suggests dehydration, although no MONI. Referral to wound care, no open wounds but skin is very tender and caregiver would like some education on caring for her skin. May also benefit from lymphedema therapy     ? Atrial Fibrillation, Paroxysmal   - no AF on EKGs in system. Very brief fast SVT on telemetry strips, from what I have reviewed I don't see any over 10sec. Does have risk factors for AF  - obtain zio patch to determine afib burden, confirm/deny diagnosis?  - not on any rate controlling medicines    Chronic Anticoagulation  - [id number]: at most 2 (female, diastolic HF), guidelines recommend anticoagulation for CHADSVASC of 3 or higher in females  - will continue OAC for now, HAS BLED is 0. If no afib burden on zio patch I would discuss stopping anticoagulation with the patient.     Tobacco use   - 3 days without cigarette       Zio patch for afib diagnosis. Follow up in 6mo, sooner if problems

## 2022-04-06 ENCOUNTER — TELEPHONE (OUTPATIENT)
Dept: CARDIOLOGY | Facility: MEDICAL CENTER | Age: 47
End: 2022-04-06
Payer: MEDICAID

## 2022-04-06 ENCOUNTER — OFFICE VISIT (OUTPATIENT)
Dept: CARDIOLOGY | Facility: MEDICAL CENTER | Age: 47
End: 2022-04-06
Payer: MEDICAID

## 2022-04-06 VITALS
HEART RATE: 70 BPM | OXYGEN SATURATION: 96 % | SYSTOLIC BLOOD PRESSURE: 114 MMHG | BODY MASS INDEX: 53 KG/M2 | WEIGHT: 288 LBS | HEIGHT: 62 IN | RESPIRATION RATE: 16 BRPM | DIASTOLIC BLOOD PRESSURE: 60 MMHG

## 2022-04-06 DIAGNOSIS — J96.11 CHRONIC RESPIRATORY FAILURE WITH HYPOXIA AND HYPERCAPNIA (HCC): ICD-10-CM

## 2022-04-06 DIAGNOSIS — I27.20 PULMONARY HYPERTENSION (HCC): ICD-10-CM

## 2022-04-06 DIAGNOSIS — J96.12 CHRONIC RESPIRATORY FAILURE WITH HYPOXIA AND HYPERCAPNIA (HCC): ICD-10-CM

## 2022-04-06 DIAGNOSIS — G47.33 OSA (OBSTRUCTIVE SLEEP APNEA): ICD-10-CM

## 2022-04-06 DIAGNOSIS — I87.2 VENOUS STASIS DERMATITIS OF BOTH LOWER EXTREMITIES: ICD-10-CM

## 2022-04-06 DIAGNOSIS — I48.0 PAROXYSMAL ATRIAL FIBRILLATION (HCC): ICD-10-CM

## 2022-04-06 DIAGNOSIS — E66.01 MORBID OBESITY (HCC): ICD-10-CM

## 2022-04-06 DIAGNOSIS — Z79.01 CHRONIC ANTICOAGULATION: ICD-10-CM

## 2022-04-06 PROCEDURE — 99214 OFFICE O/P EST MOD 30 MIN: CPT | Performed by: PHYSICIAN ASSISTANT

## 2022-04-06 RX ORDER — FUROSEMIDE 40 MG/1
40 TABLET ORAL DAILY
Qty: 90 TABLET | Refills: 3 | Status: SHIPPED | OUTPATIENT
Start: 2022-04-06 | End: 2022-04-06 | Stop reason: SDUPTHER

## 2022-04-06 RX ORDER — FUROSEMIDE 40 MG/1
40 TABLET ORAL DAILY
Qty: 90 TABLET | Refills: 3 | Status: SHIPPED | OUTPATIENT
Start: 2022-04-06 | End: 2022-08-26

## 2022-04-06 ASSESSMENT — FIBROSIS 4 INDEX: FIB4 SCORE: 0.77

## 2022-04-06 ASSESSMENT — ENCOUNTER SYMPTOMS
LOSS OF CONSCIOUSNESS: 0
PALPITATIONS: 1
ORTHOPNEA: 0
SHORTNESS OF BREATH: 0
BLOOD IN STOOL: 0
DIZZINESS: 1
COUGH: 0

## 2022-04-06 NOTE — TELEPHONE ENCOUNTER
Received message from Wendy VANEGAS that patient wants medications sent to [facility] on [address].     RX reordered.

## 2022-04-06 NOTE — Clinical Note
Lalo Lopez, I don't know how well you know Sia, but I saw her today, she was falling asleep in the exam and her caregiver witnesses apneic episodes. I was wondering if you can re-address CPAP with her when you see her at the 6/1 follow up. Currently she is not using one.   Thank you! JA

## 2022-04-07 ENCOUNTER — TELEPHONE (OUTPATIENT)
Dept: VASCULAR LAB | Facility: MEDICAL CENTER | Age: 47
End: 2022-04-07
Payer: MEDICAID

## 2022-04-07 ENCOUNTER — ANTICOAGULATION VISIT (OUTPATIENT)
Dept: VASCULAR LAB | Facility: MEDICAL CENTER | Age: 47
End: 2022-04-07
Attending: INTERNAL MEDICINE
Payer: MEDICAID

## 2022-04-07 VITALS — HEART RATE: 80 BPM | SYSTOLIC BLOOD PRESSURE: 115 MMHG | DIASTOLIC BLOOD PRESSURE: 73 MMHG

## 2022-04-07 DIAGNOSIS — I48.11 LONGSTANDING PERSISTENT ATRIAL FIBRILLATION (HCC): ICD-10-CM

## 2022-04-07 DIAGNOSIS — D68.69 SECONDARY HYPERCOAGULABLE STATE (HCC): ICD-10-CM

## 2022-04-07 LAB — INR PPP: 1.6 (ref 2–3.5)

## 2022-04-07 PROCEDURE — 85610 PROTHROMBIN TIME: CPT

## 2022-04-07 PROCEDURE — 99212 OFFICE O/P EST SF 10 MIN: CPT | Performed by: NURSE PRACTITIONER

## 2022-04-07 NOTE — TELEPHONE ENCOUNTER
Unable to leave vm message for pt to have INR checked VM is full  Letter sent    Ceci Coe, Clinical Pharmacist, CDE, CACP

## 2022-04-07 NOTE — LETTER
Lorene Haro  [address]  [address]  [place] [address]    04/07/22    Dear Lorene Haro ,    We have been unsuccessful in our attempts to contact you regarding your Anticoagulation Service appointments. Warfarin is a potent blood-thinning agent that requires monitoring to ensure that the dosage is correct for your body.  If it isn't, you could develop serious, sometimes life-threatening bleeding problems or life-threatening blood clots or stroke could result.    To monitor you effectively, we need to be able to communicate with you.  This is a requirement to be followed by our Service.       If you repeatedly fail to keep your lab appointments, you are at risk of being discharged from the Anticoagulation Service.    It is extremely important that you contact the clinic as soon as possible to arrange appropriate follow up.  We are open Monday-Friday 8 am until 5 pm.  You may reach our Service at [phone].           Sincerely,           Ciro Mohamud, PharmD, [facility]  Clinic Supervisor  [facility]  Outpatient Anticoagulation Service

## 2022-04-08 LAB — INR BLD: 1.6 (ref 0.9–1.2)

## 2022-04-13 ENCOUNTER — NON-PROVIDER VISIT (OUTPATIENT)
Dept: CARDIOLOGY | Facility: MEDICAL CENTER | Age: 47
End: 2022-04-13
Payer: MEDICAID

## 2022-04-13 DIAGNOSIS — I49.3 PVC'S (PREMATURE VENTRICULAR CONTRACTIONS): ICD-10-CM

## 2022-04-13 DIAGNOSIS — I49.1 APC (ATRIAL PREMATURE CONTRACTIONS): ICD-10-CM

## 2022-04-13 DIAGNOSIS — I48.0 PAROXYSMAL ATRIAL FIBRILLATION (HCC): ICD-10-CM

## 2022-04-13 NOTE — PROGRESS NOTES
Patient enrolled in the 30 day [employer] OT Heart monitoring program per Angelita Posada PA-C..  >Office hook-up with [employer] transmitted, serial # [id number].  >Pending EOS

## 2022-04-14 ENCOUNTER — TELEPHONE (OUTPATIENT)
Dept: VASCULAR LAB | Facility: MEDICAL CENTER | Age: 47
End: 2022-04-14
Payer: MEDICAID

## 2022-04-20 ENCOUNTER — APPOINTMENT (OUTPATIENT)
Dept: RADIOLOGY | Facility: MEDICAL CENTER | Age: 47
End: 2022-04-20
Attending: EMERGENCY MEDICINE
Payer: MEDICAID

## 2022-04-20 ENCOUNTER — HOSPITAL ENCOUNTER (EMERGENCY)
Facility: MEDICAL CENTER | Age: 47
End: 2022-04-21
Attending: EMERGENCY MEDICINE
Payer: MEDICAID

## 2022-04-20 DIAGNOSIS — B35.9 TINEA: ICD-10-CM

## 2022-04-20 DIAGNOSIS — J18.9 PNEUMONIA DUE TO INFECTIOUS ORGANISM, UNSPECIFIED LATERALITY, UNSPECIFIED PART OF LUNG: ICD-10-CM

## 2022-04-20 LAB
ALBUMIN SERPL BCP-MCNC: 3.9 G/DL (ref 3.2–4.9)
ALBUMIN/GLOB SERPL: 1.2 G/DL
ALP SERPL-CCNC: 139 U/L (ref 30–99)
ALT SERPL-CCNC: 15 U/L (ref 2–50)
ANION GAP SERPL CALC-SCNC: 11 MMOL/L (ref 7–16)
AST SERPL-CCNC: 12 U/L (ref 12–45)
BASOPHILS # BLD AUTO: 0.2 % (ref 0–1.8)
BASOPHILS # BLD: 0.02 K/UL (ref 0–0.12)
BILIRUB SERPL-MCNC: 0.3 MG/DL (ref 0.1–1.5)
BUN SERPL-MCNC: 15 MG/DL (ref 8–22)
CALCIUM SERPL-MCNC: 9.4 MG/DL (ref 8.5–10.5)
CHLORIDE SERPL-SCNC: 108 MMOL/L (ref 96–112)
CO2 SERPL-SCNC: 29 MMOL/L (ref 20–33)
CREAT SERPL-MCNC: 1 MG/DL (ref 0.5–1.4)
D DIMER PPP IA.FEU-MCNC: <0.27 UG/ML (FEU) (ref 0–0.5)
EKG IMPRESSION: NORMAL
EOSINOPHIL # BLD AUTO: 0.15 K/UL (ref 0–0.51)
EOSINOPHIL NFR BLD: 1.4 % (ref 0–6.9)
ERYTHROCYTE [DISTWIDTH] IN BLOOD BY AUTOMATED COUNT: 53.2 FL (ref 35.9–50)
GFR SERPLBLD CREATININE-BSD FMLA CKD-EPI: 69 ML/MIN/1.73 M 2
GLOBULIN SER CALC-MCNC: 3.2 G/DL (ref 1.9–3.5)
GLUCOSE SERPL-MCNC: 116 MG/DL (ref 65–99)
HCT VFR BLD AUTO: 35.6 % (ref 37–47)
HGB BLD-MCNC: 10.9 G/DL (ref 12–16)
IMM GRANULOCYTES # BLD AUTO: 0.05 K/UL (ref 0–0.11)
IMM GRANULOCYTES NFR BLD AUTO: 0.5 % (ref 0–0.9)
INR PPP: 2.21 (ref 0.87–1.13)
LIPASE SERPL-CCNC: 32 U/L (ref 11–82)
LYMPHOCYTES # BLD AUTO: 1.82 K/UL (ref 1–4.8)
LYMPHOCYTES NFR BLD: 16.6 % (ref 22–41)
MCH RBC QN AUTO: 26 PG (ref 27–33)
MCHC RBC AUTO-ENTMCNC: 30.6 G/DL (ref 33.6–35)
MCV RBC AUTO: 85 FL (ref 81.4–97.8)
MONOCYTES # BLD AUTO: 0.54 K/UL (ref 0–0.85)
MONOCYTES NFR BLD AUTO: 4.9 % (ref 0–13.4)
NEUTROPHILS # BLD AUTO: 8.41 K/UL (ref 2–7.15)
NEUTROPHILS NFR BLD: 76.4 % (ref 44–72)
NRBC # BLD AUTO: 0 K/UL
NRBC BLD-RTO: 0 /100 WBC
PLATELET # BLD AUTO: 268 K/UL (ref 164–446)
PMV BLD AUTO: 11.1 FL (ref 9–12.9)
POTASSIUM SERPL-SCNC: 3.6 MMOL/L (ref 3.6–5.5)
PROT SERPL-MCNC: 7.1 G/DL (ref 6–8.2)
PROTHROMBIN TIME: 23.9 SEC (ref 12–14.6)
RBC # BLD AUTO: 4.19 M/UL (ref 4.2–5.4)
SODIUM SERPL-SCNC: 148 MMOL/L (ref 135–145)
TROPONIN T SERPL-MCNC: 24 NG/L (ref 6–19)
WBC # BLD AUTO: 11 K/UL (ref 4.8–10.8)

## 2022-04-20 PROCEDURE — 85379 FIBRIN DEGRADATION QUANT: CPT

## 2022-04-20 PROCEDURE — 36415 COLL VENOUS BLD VENIPUNCTURE: CPT

## 2022-04-20 PROCEDURE — 99284 EMERGENCY DEPT VISIT MOD MDM: CPT

## 2022-04-20 PROCEDURE — 96374 THER/PROPH/DIAG INJ IV PUSH: CPT

## 2022-04-20 PROCEDURE — 700111 HCHG RX REV CODE 636 W/ 250 OVERRIDE (IP): Performed by: EMERGENCY MEDICINE

## 2022-04-20 PROCEDURE — 83690 ASSAY OF LIPASE: CPT

## 2022-04-20 PROCEDURE — 80053 COMPREHEN METABOLIC PANEL: CPT

## 2022-04-20 PROCEDURE — 85025 COMPLETE CBC W/AUTO DIFF WBC: CPT

## 2022-04-20 PROCEDURE — 93005 ELECTROCARDIOGRAM TRACING: CPT | Performed by: EMERGENCY MEDICINE

## 2022-04-20 PROCEDURE — 93005 ELECTROCARDIOGRAM TRACING: CPT

## 2022-04-20 PROCEDURE — 85610 PROTHROMBIN TIME: CPT

## 2022-04-20 PROCEDURE — 71045 X-RAY EXAM CHEST 1 VIEW: CPT

## 2022-04-20 PROCEDURE — 84484 ASSAY OF TROPONIN QUANT: CPT

## 2022-04-20 RX ORDER — NYSTATIN 100000 U/G
1 OINTMENT TOPICAL 2 TIMES DAILY
Qty: 30 G | Refills: 0 | Status: SHIPPED | OUTPATIENT
Start: 2022-04-20 | End: 2022-06-17

## 2022-04-20 RX ORDER — CEFTRIAXONE 1 G/1
1 INJECTION, POWDER, FOR SOLUTION INTRAMUSCULAR; INTRAVENOUS ONCE
Status: COMPLETED | OUTPATIENT
Start: 2022-04-20 | End: 2022-04-20

## 2022-04-20 RX ORDER — DOXYCYCLINE 100 MG/1
100 CAPSULE ORAL 2 TIMES DAILY
Qty: 20 CAPSULE | Refills: 0 | Status: SHIPPED | OUTPATIENT
Start: 2022-04-20 | End: 2022-08-26

## 2022-04-20 RX ADMIN — CEFTRIAXONE SODIUM 1 G: 1 INJECTION, POWDER, FOR SOLUTION INTRAMUSCULAR; INTRAVENOUS at 23:15

## 2022-04-20 ASSESSMENT — FIBROSIS 4 INDEX: FIB4 SCORE: 0.77

## 2022-04-21 VITALS
DIASTOLIC BLOOD PRESSURE: 57 MMHG | TEMPERATURE: 97.8 F | HEIGHT: 62 IN | OXYGEN SATURATION: 98 % | WEIGHT: 293 LBS | HEART RATE: 79 BPM | SYSTOLIC BLOOD PRESSURE: 110 MMHG | RESPIRATION RATE: 14 BRPM | BODY MASS INDEX: 53.92 KG/M2

## 2022-04-21 NOTE — ED NOTES
"Pt discharged ambulatory. IV discontinued and gauze placed, pt in possession of belongings. Pt provided discharge education and information pertaining to medications and follow up appointments. Pt received copy of discharge instructions and verbalized understanding. /57   Pulse 79   Temp 36.6 °C (97.8 °F) (Temporal)   Resp 14   Ht 1.575 m (5' 2\")   Wt (!) 134 kg (294 lb 5 oz)   LMP  (LMP Unknown)   SpO2 98%   BMI 53.83 kg/m²   "

## 2022-04-21 NOTE — ED PROVIDER NOTES
ED Provider Note    CHIEF COMPLAINT  Chief Complaint   Patient presents with   • Chest Pain     X 1 hour, sharp pain to the mid chest    • Shortness of Breath     X 1 hour, hx COPD on 2L NC at baseline    • Nausea     X 3 days    • Pelvic Pain     Improves after voiding    • Headache     X 3 days, unrelieved by Excedrin        HPI  Lorene Haro is a 46 y.o. female for evaluation of shortness of breath.  Patient states that she has had some nausea and shortness of breath over the last couple of days, associated with some intermittent chest tightness.  She has no fever chills or vomiting, she has no history of falls or trauma.  She has history of COPD and is on 2 L of oxygen normally at home.  Nothing seems to leave exacerbate any of her symptoms.  She has no back pain or headache.      ROS  See HPI for further details, o/w negative.     PAST MEDICAL HISTORY   has a past medical history of A-fib (Formerly [facility]), Asthma, Bipolar 2 disorder (Formerly [facility]), Chickenpox, Chronic obstructive pulmonary disease (HCC), Hypertension, On home oxygen therapy, Other psychological stress, Schizophrenic disorder (Formerly [facility]), and Yeast infection of the skin (4/1/2021).    SOCIAL HISTORY  Social History     Tobacco Use   • Smoking status: Former Smoker     Years: 20.00     Types: Cigarettes     Start date: 4/4/2022   • Smokeless tobacco: Never Used   Vaping Use   • Vaping Use: Never used   Substance and Sexual Activity   • Alcohol use: No   • Drug use: No   • Sexual activity: Not Currently       Family History  No bleeding disorders noted    SURGICAL HISTORY   has a past surgical history that includes hysterectomy laparoscopy; colectomy; cholecystectomy; and knee arthroscopy (Left).    CURRENT MEDICATIONS  Home Medications     Reviewed by Liz Mukherjee R.N. (Registered Nurse) on 04/20/22 at 2111  Med List Status: <None>   Medication Last Dose Status   acetaminophen (TYLENOL) 325 MG Tab  Active   albuterol 108 (90 Base) MCG/ACT Aero Soln inhalation  aerosol  Active   aripiprazole (ABILIFY) 30 MG tablet  Active   atorvastatin (LIPITOR) 20 MG Tab  Active   budesonide-formoterol (SYMBICORT) 160-4.5 MCG/ACT Aerosol  Active   famotidine (PEPCID) 40 MG Tab  Active   fluticasone (FLONASE) 50 MCG/ACT nasal spray  Active   furosemide (LASIX) 40 MG Tab  Active   gabapentin (NEURONTIN) 300 MG Cap  Active   ketoconazole (NIZORAL) 2 % Cream  Active   lidocaine (LIDODERM) 5 % Patch  Active   montelukast (SINGULAIR) 10 MG Tab  Active   nitroglycerin (NITROSTAT) 0.4 MG SL Tab  Active   selenium sulfide 2.5% (SELSUN) 2.5 % Lotion  Active   spironolactone (ALDACTONE) 25 MG Tab  Active   tiotropium (SPIRIVA HANDIHALER) 18 MCG Cap  Active   traZODone (DESYREL) 50 MG Tab  Active   warfarin (COUMADIN) 6 MG Tab  Active                ALLERGIES  Allergies   Allergen Reactions   • Penicillin G Rash and Itching     pt reports that she gets a rash all over her body and gets itchy   • Amoxicillin Rash and Itching     Tolerates cephalosporins, pt reports that she gets a rash all over her body and gets itchy       REVIEW OF SYSTEMS  See HPI for further details. Review of systems as above, otherwise all other systems are negative.     PHYSICAL EXAM  Constitutional: Well developed, well nourished.  Mild acute distress.  HEENT: Normocephalic, atraumatic. Posterior pharynx clear and moist.  Eyes:  EOMI. Normal sclera.  Neck: Supple, Full range of motion, nontender.  Chest/Pulmonary: Diminished to ausculation. Symmetrical expansion.  Reproducible chest wall tenderness bilateral sternum.  Cardio: Regular rate and rhythm with no murmur.   Abdomen: Soft, nontender. No peritoneal signs. No guarding. No palpable masses.  Back: No CVA tenderness, nontender midline, no step offs.  Musculoskeletal: No deformity, no edema, neurovascular intact.   Neuro: Clear speech, appropriate, cooperative, cranial nerves II-XII grossly intact.  Psych: Anxious mood and affect    PROCEDURES     MEDICAL RECORD  I have  reviewed patient's medical record and pertinent results are listed.    COURSE & MEDICAL DECISION MAKING  I have reviewed any medical record information, laboratory studies and radiographic results as noted above.      Results for orders placed or performed during the hospital encounter of 04/20/22   CBC with Differential   Result Value Ref Range    WBC 11.0 (H) 4.8 - 10.8 K/uL    RBC 4.19 (L) 4.20 - 5.40 M/uL    Hemoglobin 10.9 (L) 12.0 - 16.0 g/dL    Hematocrit 35.6 (L) 37.0 - 47.0 %    MCV 85.0 81.4 - 97.8 fL    MCH 26.0 (L) 27.0 - 33.0 pg    MCHC 30.6 (L) 33.6 - 35.0 g/dL    RDW 53.2 (H) 35.9 - 50.0 fL    Platelet Count 268 164 - 446 K/uL    MPV 11.1 9.0 - 12.9 fL    Neutrophils-Polys 76.40 (H) 44.00 - 72.00 %    Lymphocytes 16.60 (L) 22.00 - 41.00 %    Monocytes 4.90 0.00 - 13.40 %    Eosinophils 1.40 0.00 - 6.90 %    Basophils 0.20 0.00 - 1.80 %    Immature Granulocytes 0.50 0.00 - 0.90 %    Nucleated RBC 0.00 /100 WBC    Neutrophils (Absolute) 8.41 (H) 2.00 - 7.15 K/uL    Lymphs (Absolute) 1.82 1.00 - 4.80 K/uL    Monos (Absolute) 0.54 0.00 - 0.85 K/uL    Eos (Absolute) 0.15 0.00 - 0.51 K/uL    Baso (Absolute) 0.02 0.00 - 0.12 K/uL    Immature Granulocytes (abs) 0.05 0.00 - 0.11 K/uL    NRBC (Absolute) 0.00 K/uL   Complete Metabolic Panel (CMP)   Result Value Ref Range    Sodium 148 (H) 135 - 145 mmol/L    Potassium 3.6 3.6 - 5.5 mmol/L    Chloride 108 96 - 112 mmol/L    Co2 29 20 - 33 mmol/L    Anion Gap 11.0 7.0 - 16.0    Glucose 116 (H) 65 - 99 mg/dL    Bun 15 8 - 22 mg/dL    Creatinine 1.00 0.50 - 1.40 mg/dL    Calcium 9.4 8.5 - 10.5 mg/dL    AST(SGOT) 12 12 - 45 U/L    ALT(SGPT) 15 2 - 50 U/L    Alkaline Phosphatase 139 (H) 30 - 99 U/L    Total Bilirubin 0.3 0.1 - 1.5 mg/dL    Albumin 3.9 3.2 - 4.9 g/dL    Total Protein 7.1 6.0 - 8.2 g/dL    Globulin 3.2 1.9 - 3.5 g/dL    A-G Ratio 1.2 g/dL   Troponin   Result Value Ref Range    Troponin T 24 (H) 6 - 19 ng/L   LIPASE   Result Value Ref Range    Lipase 32  11 - 82 U/L   D-DIMER   Result Value Ref Range    D-Dimer Screen <0.27 0.00 - 0.50 ug/mL (FEU)   ESTIMATED GFR   Result Value Ref Range    GFR (CKD-EPI) 69 >60 mL/min/1.73 m 2   PT/INR   Result Value Ref Range    PT 23.9 (H) 12.0 - 14.6 sec    INR 2.21 (H) 0.87 - 1.13   EKG   Result Value Ref Range    Report       [facility]    Test Date:  2022  Pt Name:    ADELINA MILLAN                Department: ER  MRN:        [id number]                      Room:  Gender:     Female                       Technician: [id number]  :        1975                   Requested By:ER TRIAGE PROTOCOL  Order #:    [id number]                    Reading MD:    Measurements  Intervals                                Axis  Rate:       88                           P:          49  TX:         137                          QRS:        45  QRSD:       100                          T:          17  QT:         342  QTc:        406    Interpretive Statements  Sinus rhythm  Atrial premature complexes  Abnormal R-wave progression, late transition  Compared to ECG 2022 21:33:12  Atrial premature complex(es) now present  Intraventricular conduction delay no longer present       DX-CHEST-PORTABLE (1 VIEW)   Final Result         1.  Hazy left lung base opacity, could represent subtle infiltrate.        EKG shows normal sinus rhythm at a rate of 88.  No ST elevation, no ST depression.  QTC is 406.  Compared to EKG from 3/22/2022.    11:10 PM  The patient is nontoxic-appearing, afebrile comfortable.  She has a known pneumonia at the left lung base, which is consistent with her cough and shortness of breath.  Her D-dimer is negative, her troponin is 20s, and her EKG is remarkable.  She has home oxygen of 2 L.  She will be placed on antibiotics here, and then follow-up tomorrow to  her other medicines.  She states her mom can do this.      If you have had any blood pressure issues while here in the emergency  department, please see your doctor for a further evaluation or work up.    Differential diagnoses include but not limited to: Pneumonia, PE, MI, COPD exacerbation    This patient presents with pneumonia and tinea.  At this time, I have counseled the patient/family regarding their medications, pain control, and follow up.  They will continue their medications, if any, as prescribed.  They will return immediately for any worsening symptoms and/or any other medical concerns.  They will see their doctor, or contact the doctor provided, in 1-2 days for follow up.       FINAL IMPRESSION  Pneumonia  Tinea      Electronically signed by: Esequiel Ko D.O., 4/20/2022 10:33 PM

## 2022-04-21 NOTE — ED NOTES
Pt awake and alert, no acute distress noted. Pt tested off of O2, O2 remains 90%, pt states she feels okay to go home via taxi without oxygen.

## 2022-04-21 NOTE — ED TRIAGE NOTES
Lorene Haro  46 y.o. female  Chief Complaint   Patient presents with   • Chest Pain     X 1 hour, sharp pain to the mid chest    • Shortness of Breath     X 1 hour, hx COPD on 2L NC at baseline    • Nausea     X 3 days    • Pelvic Pain     Improves after voiding    • Headache     X 3 days, unrelieved by Excedrin        Vitals:    04/20/22 2059   BP: 148/93   Pulse: 92   Resp: (Abnormal) 24   Temp: 36.4 °C (97.5 °F)   SpO2: 100%       Triage process explained to patient, apologized for wait time, and returned to lobby.  Pt informed to notify staff of any change in condition.

## 2022-04-21 NOTE — ED NOTES
Pt awake and alert. Pt states one of the children in her family are sick. Pt reports runny nose and body aches as well. Pt respirations unlabored at rest, vitals currently stable.

## 2022-04-26 ENCOUNTER — HOSPITAL ENCOUNTER (EMERGENCY)
Facility: MEDICAL CENTER | Age: 47
End: 2022-04-27
Attending: EMERGENCY MEDICINE
Payer: MEDICAID

## 2022-04-26 ENCOUNTER — APPOINTMENT (OUTPATIENT)
Dept: RADIOLOGY | Facility: MEDICAL CENTER | Age: 47
End: 2022-04-26
Attending: EMERGENCY MEDICINE
Payer: MEDICAID

## 2022-04-26 DIAGNOSIS — R19.7 DIARRHEA, UNSPECIFIED TYPE: ICD-10-CM

## 2022-04-26 DIAGNOSIS — R11.0 NAUSEA: ICD-10-CM

## 2022-04-26 DIAGNOSIS — J45.41 MODERATE PERSISTENT ASTHMA WITH ACUTE EXACERBATION: ICD-10-CM

## 2022-04-26 LAB
ALBUMIN SERPL BCP-MCNC: 4 G/DL (ref 3.2–4.9)
ALBUMIN/GLOB SERPL: 1.2 G/DL
ALP SERPL-CCNC: 154 U/L (ref 30–99)
ALT SERPL-CCNC: 15 U/L (ref 2–50)
ANION GAP SERPL CALC-SCNC: 6 MMOL/L (ref 7–16)
ANION GAP SERPL CALC-SCNC: 9 MMOL/L (ref 7–16)
APPEARANCE UR: CLEAR
AST SERPL-CCNC: 19 U/L (ref 12–45)
BASOPHILS # BLD AUTO: 0.2 % (ref 0–1.8)
BASOPHILS # BLD: 0.02 K/UL (ref 0–0.12)
BILIRUB SERPL-MCNC: 0.5 MG/DL (ref 0.1–1.5)
BILIRUB UR QL STRIP.AUTO: NEGATIVE
BUN SERPL-MCNC: 12 MG/DL (ref 8–22)
BUN SERPL-MCNC: 13 MG/DL (ref 8–22)
CALCIUM SERPL-MCNC: 9.1 MG/DL (ref 8.5–10.5)
CALCIUM SERPL-MCNC: 9.7 MG/DL (ref 8.5–10.5)
CHLORIDE SERPL-SCNC: 103 MMOL/L (ref 96–112)
CHLORIDE SERPL-SCNC: 104 MMOL/L (ref 96–112)
CO2 SERPL-SCNC: 29 MMOL/L (ref 20–33)
CO2 SERPL-SCNC: 31 MMOL/L (ref 20–33)
COLOR UR: YELLOW
CREAT SERPL-MCNC: 0.85 MG/DL (ref 0.5–1.4)
CREAT SERPL-MCNC: 0.98 MG/DL (ref 0.5–1.4)
EOSINOPHIL # BLD AUTO: 0.15 K/UL (ref 0–0.51)
EOSINOPHIL NFR BLD: 1.5 % (ref 0–6.9)
ERYTHROCYTE [DISTWIDTH] IN BLOOD BY AUTOMATED COUNT: 53.4 FL (ref 35.9–50)
FLUAV RNA SPEC QL NAA+PROBE: NEGATIVE
FLUBV RNA SPEC QL NAA+PROBE: NEGATIVE
GFR SERPLBLD CREATININE-BSD FMLA CKD-EPI: 71 ML/MIN/1.73 M 2
GFR SERPLBLD CREATININE-BSD FMLA CKD-EPI: 84 ML/MIN/1.73 M 2
GLOBULIN SER CALC-MCNC: 3.4 G/DL (ref 1.9–3.5)
GLUCOSE SERPL-MCNC: 89 MG/DL (ref 65–99)
GLUCOSE SERPL-MCNC: 92 MG/DL (ref 65–99)
GLUCOSE UR STRIP.AUTO-MCNC: NEGATIVE MG/DL
HCT VFR BLD AUTO: 39.7 % (ref 37–47)
HGB BLD-MCNC: 12 G/DL (ref 12–16)
IMM GRANULOCYTES # BLD AUTO: 0.06 K/UL (ref 0–0.11)
IMM GRANULOCYTES NFR BLD AUTO: 0.6 % (ref 0–0.9)
KETONES UR STRIP.AUTO-MCNC: NEGATIVE MG/DL
LEUKOCYTE ESTERASE UR QL STRIP.AUTO: NEGATIVE
LIPASE SERPL-CCNC: 29 U/L (ref 11–82)
LYMPHOCYTES # BLD AUTO: 1.87 K/UL (ref 1–4.8)
LYMPHOCYTES NFR BLD: 18.9 % (ref 22–41)
MCH RBC QN AUTO: 25.6 PG (ref 27–33)
MCHC RBC AUTO-ENTMCNC: 30.2 G/DL (ref 33.6–35)
MCV RBC AUTO: 84.6 FL (ref 81.4–97.8)
MICRO URNS: NORMAL
MONOCYTES # BLD AUTO: 0.55 K/UL (ref 0–0.85)
MONOCYTES NFR BLD AUTO: 5.6 % (ref 0–13.4)
NEUTROPHILS # BLD AUTO: 7.22 K/UL (ref 2–7.15)
NEUTROPHILS NFR BLD: 73.2 % (ref 44–72)
NITRITE UR QL STRIP.AUTO: NEGATIVE
NRBC # BLD AUTO: 0 K/UL
NRBC BLD-RTO: 0 /100 WBC
PH UR STRIP.AUTO: 6.5 [PH] (ref 5–8)
PLATELET # BLD AUTO: 308 K/UL (ref 164–446)
PMV BLD AUTO: 11.1 FL (ref 9–12.9)
POTASSIUM SERPL-SCNC: 4.3 MMOL/L (ref 3.6–5.5)
POTASSIUM SERPL-SCNC: 5 MMOL/L (ref 3.6–5.5)
PROT SERPL-MCNC: 7.4 G/DL (ref 6–8.2)
PROT UR QL STRIP: NEGATIVE MG/DL
RBC # BLD AUTO: 4.69 M/UL (ref 4.2–5.4)
RBC UR QL AUTO: NEGATIVE
RSV RNA SPEC QL NAA+PROBE: NEGATIVE
SARS-COV-2 RNA RESP QL NAA+PROBE: NOTDETECTED
SODIUM SERPL-SCNC: 141 MMOL/L (ref 135–145)
SODIUM SERPL-SCNC: 141 MMOL/L (ref 135–145)
SP GR UR STRIP.AUTO: 1.01
SPECIMEN SOURCE: NORMAL
TROPONIN T SERPL-MCNC: 21 NG/L (ref 6–19)
TROPONIN T SERPL-MCNC: 22 NG/L (ref 6–19)
UROBILINOGEN UR STRIP.AUTO-MCNC: 0.2 MG/DL
WBC # BLD AUTO: 9.9 K/UL (ref 4.8–10.8)

## 2022-04-26 PROCEDURE — 84484 ASSAY OF TROPONIN QUANT: CPT

## 2022-04-26 PROCEDURE — 81003 URINALYSIS AUTO W/O SCOPE: CPT

## 2022-04-26 PROCEDURE — 36415 COLL VENOUS BLD VENIPUNCTURE: CPT

## 2022-04-26 PROCEDURE — 80053 COMPREHEN METABOLIC PANEL: CPT

## 2022-04-26 PROCEDURE — 0241U HCHG SARS-COV-2 COVID-19 NFCT DS RESP RNA 4 TRGT MIC: CPT

## 2022-04-26 PROCEDURE — 99284 EMERGENCY DEPT VISIT MOD MDM: CPT

## 2022-04-26 PROCEDURE — A9270 NON-COVERED ITEM OR SERVICE: HCPCS | Performed by: EMERGENCY MEDICINE

## 2022-04-26 PROCEDURE — 83690 ASSAY OF LIPASE: CPT

## 2022-04-26 PROCEDURE — 71045 X-RAY EXAM CHEST 1 VIEW: CPT

## 2022-04-26 PROCEDURE — 80048 BASIC METABOLIC PNL TOTAL CA: CPT

## 2022-04-26 PROCEDURE — 93005 ELECTROCARDIOGRAM TRACING: CPT | Performed by: EMERGENCY MEDICINE

## 2022-04-26 PROCEDURE — 700102 HCHG RX REV CODE 250 W/ 637 OVERRIDE(OP): Performed by: EMERGENCY MEDICINE

## 2022-04-26 PROCEDURE — C9803 HOPD COVID-19 SPEC COLLECT: HCPCS | Performed by: EMERGENCY MEDICINE

## 2022-04-26 PROCEDURE — 85025 COMPLETE CBC W/AUTO DIFF WBC: CPT

## 2022-04-26 RX ORDER — ACETAMINOPHEN 325 MG/1
650 TABLET ORAL ONCE
Status: COMPLETED | OUTPATIENT
Start: 2022-04-26 | End: 2022-04-26

## 2022-04-26 RX ORDER — ALBUTEROL SULFATE 90 UG/1
4 AEROSOL, METERED RESPIRATORY (INHALATION)
Status: COMPLETED | OUTPATIENT
Start: 2022-04-26 | End: 2022-04-26

## 2022-04-26 RX ORDER — ONDANSETRON 4 MG/1
4 TABLET, ORALLY DISINTEGRATING ORAL ONCE
Status: DISCONTINUED | OUTPATIENT
Start: 2022-04-27 | End: 2022-04-27 | Stop reason: HOSPADM

## 2022-04-26 RX ORDER — ONDANSETRON 2 MG/ML
4 INJECTION INTRAMUSCULAR; INTRAVENOUS ONCE
Status: DISCONTINUED | OUTPATIENT
Start: 2022-04-26 | End: 2022-04-27 | Stop reason: HOSPADM

## 2022-04-26 RX ADMIN — ALBUTEROL SULFATE 4 PUFF: 90 AEROSOL, METERED RESPIRATORY (INHALATION) at 22:07

## 2022-04-26 RX ADMIN — ACETAMINOPHEN 650 MG: 325 TABLET, FILM COATED ORAL at 22:06

## 2022-04-26 ASSESSMENT — FIBROSIS 4 INDEX: FIB4 SCORE: 0.48

## 2022-04-27 VITALS
RESPIRATION RATE: 20 BRPM | BODY MASS INDEX: 53.77 KG/M2 | TEMPERATURE: 98.1 F | OXYGEN SATURATION: 97 % | WEIGHT: 293 LBS | HEART RATE: 82 BPM | DIASTOLIC BLOOD PRESSURE: 86 MMHG | SYSTOLIC BLOOD PRESSURE: 130 MMHG

## 2022-04-27 LAB — EKG IMPRESSION: NORMAL

## 2022-04-27 RX ORDER — ONDANSETRON 4 MG/1
4 TABLET, ORALLY DISINTEGRATING ORAL EVERY 8 HOURS PRN
Qty: 10 TABLET | Refills: 0 | Status: SHIPPED | OUTPATIENT
Start: 2022-04-27 | End: 2022-06-10 | Stop reason: SDUPTHER

## 2022-04-27 RX ORDER — LOPERAMIDE HYDROCHLORIDE 2 MG/1
2 CAPSULE ORAL 4 TIMES DAILY PRN
Qty: 30 CAPSULE | Refills: 0 | Status: SHIPPED | OUTPATIENT
Start: 2022-04-27 | End: 2022-06-10 | Stop reason: SDUPTHER

## 2022-04-27 NOTE — ED NOTES
Discharge education provided. Discharge paperwork signed by pt. Prescription to be picked up by pt. All questions answered. All belongings with pt. Pt ambulated to lobby unassisted with steady gait with her walker. Pt was road tested without oxygen at this time and her stats were in the low 90's dr. bridges was aware of pt O2 saturations for her ride going home. He is okay with sending her home at this time. Pt given [facility] paperwork to call for ride home

## 2022-04-27 NOTE — ED TRIAGE NOTES
.  Chief Complaint   Patient presents with   • Weakness     malaise   • Malaise     Pt reports recent diagnosis of pneumonia, notes taking oral antibiotics but not feeling any better. Chills.       Pt BIB EMS from home fore above complaint. Per EMS Pt BP dropped to 92/64 during transport, repeated by manual BP.

## 2022-04-27 NOTE — ED PROVIDER NOTES
ED Provider Note    Scribed for José Miguel Stanton M.D. by Chrissy Tuttle. 4/26/2022, 9:16 PM.    Primary care provider: Mallika Sharp M.D.  Means of arrival: EMS  History obtained from: Patient  History limited by: None    CHIEF COMPLAINT  Chief Complaint   Patient presents with    Weakness     malaise    Malaise     Pt reports recent diagnosis of pneumonia, notes taking oral antibiotics but not feeling any better. Chills.        HPI  Lorene Haro is a 46 y.o. female with a history of COPD and congestive heart failure who presents to the Emergency Department for evaluation of weakness onset one week ago. The patient was seen here at the [facility] ED last week and was diagnosed with pneumonia. She was discharged home with antibiotics and has been compliant with taking them. She states that she has been feeling some nausea, shortness of breath, weakness, chest pain. She has also been having more frequent bowel movements with soft stool. She has a history of COPD and is on 2-3 L of oxygen at home. She has not been using her inhaler. She experiences associated leg swelling and leg pain. She denies associated vomiting, hematochezia, fever, sore throat, or dysuria.    REVIEW OF SYSTEMS  Pertinent positives include nausea, shortness of breath, weakness, chest pain, leg swelling, and leg pain. Pertinent negatives include no vomiting, hematochezia, fever, sore throat, or dysuria. All other systems negative.    PAST MEDICAL HISTORY   has a past medical history of A-fib (Formerly [facility]), Asthma, Bipolar 2 disorder (Formerly [facility]), Chickenpox, Chronic obstructive pulmonary disease (Formerly [facility]), Hypertension, On home oxygen therapy, Other psychological stress, Schizophrenic disorder (Formerly [facility]), and Yeast infection of the skin (4/1/2021).    SURGICAL HISTORY   has a past surgical history that includes hysterectomy laparoscopy; colectomy; cholecystectomy; and knee arthroscopy (Left).    SOCIAL HISTORY  Social History     Tobacco Use    Smoking status:  Former Smoker     Years: 20.00     Types: Cigarettes     Start date: 4/4/2022    Smokeless tobacco: Never Used   Vaping Use    Vaping Use: Never used   Substance Use Topics    Alcohol use: No    Drug use: No      Social History     Substance and Sexual Activity   Drug Use No       FAMILY HISTORY  Family History   Problem Relation Age of Onset    No Known Problems Mother     Cancer Sister        CURRENT MEDICATIONS  Home Medications       Reviewed by Marian Contreras R.N. (Registered Nurse) on 04/26/22 at 2026  Med List Status: Partial     Medication Last Dose Status   acetaminophen (TYLENOL) 325 MG Tab  Active   albuterol 108 (90 Base) MCG/ACT Aero Soln inhalation aerosol  Active   aripiprazole (ABILIFY) 30 MG tablet  Active   atorvastatin (LIPITOR) 20 MG Tab  Active   budesonide-formoterol (SYMBICORT) 160-4.5 MCG/ACT Aerosol  Active   doxycycline (MONODOX) 100 MG capsule 4/26/2022 Active   famotidine (PEPCID) 40 MG Tab  Active   fluticasone (FLONASE) 50 MCG/ACT nasal spray  Active   furosemide (LASIX) 40 MG Tab 4/25/2022 Active   gabapentin (NEURONTIN) 300 MG Cap  Active   ketoconazole (NIZORAL) 2 % Cream  Active   lidocaine (LIDODERM) 5 % Patch  Active   montelukast (SINGULAIR) 10 MG Tab  Active   nitroglycerin (NITROSTAT) 0.4 MG SL Tab  Active   nystatin (MYCOSTATIN) 064865 UNIT/GM Ointment  Active   selenium sulfide 2.5% (SELSUN) 2.5 % Lotion  Active   spironolactone (ALDACTONE) 25 MG Tab  Active   tiotropium (SPIRIVA HANDIHALER) 18 MCG Cap  Active   traZODone (DESYREL) 50 MG Tab  Active   warfarin (COUMADIN) 6 MG Tab  Active                    ALLERGIES  Allergies   Allergen Reactions    Penicillin G Rash and Itching     pt reports that she gets a rash all over her body and gets itchy    Amoxicillin Rash and Itching     Tolerates cephalosporins, pt reports that she gets a rash all over her body and gets itchy       PHYSICAL EXAM  VITAL SIGNS: /66   Pulse 83   Temp 36.8 °C (98.2 °F) (Temporal)   Resp  18   LMP  (LMP Unknown)   SpO2 99%     Constitutional: Well developed, Well nourished, mild distress.   HENT: Normocephalic, Atraumatic, mask in place.  Eyes: Conjunctiva normal, No discharge.   Cardiovascular: 2+ pitting edema in the lower extremities, chronic venous changes, Normal heart rate, Normal rhythm, No murmurs, equal pulses.   Pulmonary: Bilateral wheezing, No rales, No rhonchi.  Chest: No chest wall tenderness or deformity.   Abdomen:Soft, No tenderness, No masses, no rebound, no guarding.   Back: No CVA tenderness.   Musculoskeletal: No major deformities noted, No tenderness.   Skin: no warmth, Dry, No erythema, No rash.   Neurologic: Alert & oriented x 3, Normal motor function,  No focal deficits noted.   Psychiatric: Affect normal, Judgment normal, Mood normal.     LABS  Results for orders placed or performed during the hospital encounter of 04/26/22   CBC WITH DIFFERENTIAL   Result Value Ref Range    WBC 9.9 4.8 - 10.8 K/uL    RBC 4.69 4.20 - 5.40 M/uL    Hemoglobin 12.0 12.0 - 16.0 g/dL    Hematocrit 39.7 37.0 - 47.0 %    MCV 84.6 81.4 - 97.8 fL    MCH 25.6 (L) 27.0 - 33.0 pg    MCHC 30.2 (L) 33.6 - 35.0 g/dL    RDW 53.4 (H) 35.9 - 50.0 fL    Platelet Count 308 164 - 446 K/uL    MPV 11.1 9.0 - 12.9 fL    Neutrophils-Polys 73.20 (H) 44.00 - 72.00 %    Lymphocytes 18.90 (L) 22.00 - 41.00 %    Monocytes 5.60 0.00 - 13.40 %    Eosinophils 1.50 0.00 - 6.90 %    Basophils 0.20 0.00 - 1.80 %    Immature Granulocytes 0.60 0.00 - 0.90 %    Nucleated RBC 0.00 /100 WBC    Neutrophils (Absolute) 7.22 (H) 2.00 - 7.15 K/uL    Lymphs (Absolute) 1.87 1.00 - 4.80 K/uL    Monos (Absolute) 0.55 0.00 - 0.85 K/uL    Eos (Absolute) 0.15 0.00 - 0.51 K/uL    Baso (Absolute) 0.02 0.00 - 0.12 K/uL    Immature Granulocytes (abs) 0.06 0.00 - 0.11 K/uL    NRBC (Absolute) 0.00 K/uL   COMP METABOLIC PANEL   Result Value Ref Range    Sodium 141 135 - 145 mmol/L    Potassium 5.0 3.6 - 5.5 mmol/L    Chloride 103 96 - 112 mmol/L     Co2 29 20 - 33 mmol/L    Anion Gap 9.0 7.0 - 16.0    Glucose 89 65 - 99 mg/dL    Bun 12 8 - 22 mg/dL    Creatinine 0.85 0.50 - 1.40 mg/dL    Calcium 9.7 8.5 - 10.5 mg/dL    AST(SGOT) 19 12 - 45 U/L    ALT(SGPT) 15 2 - 50 U/L    Alkaline Phosphatase 154 (H) 30 - 99 U/L    Total Bilirubin 0.5 0.1 - 1.5 mg/dL    Albumin 4.0 3.2 - 4.9 g/dL    Total Protein 7.4 6.0 - 8.2 g/dL    Globulin 3.4 1.9 - 3.5 g/dL    A-G Ratio 1.2 g/dL   LIPASE   Result Value Ref Range    Lipase 29 11 - 82 U/L   CoV-2, FLU A/B, and RSV by PCR (2-4 Hours CEPHEID) : Collect NP swab in VTM    Specimen: Respirate   Result Value Ref Range    Influenza virus A RNA Negative Negative    Influenza virus B, PCR Negative Negative    RSV, PCR Negative Negative    SARS-CoV-2 by PCR NotDetected     SARS-CoV-2 Source NP Swab    TROPONIN   Result Value Ref Range    Troponin T 22 (H) 6 - 19 ng/L   URINALYSIS (UA)    Specimen: Blood   Result Value Ref Range    Color Yellow     Character Clear     Specific Gravity 1.006 <1.035    Ph 6.5 5.0 - 8.0    Glucose Negative Negative mg/dL    Ketones Negative Negative mg/dL    Protein Negative Negative mg/dL    Bilirubin Negative Negative    Urobilinogen, Urine 0.2 Negative    Nitrite Negative Negative    Leukocyte Esterase Negative Negative    Occult Blood Negative Negative    Micro Urine Req see below    ESTIMATED GFR   Result Value Ref Range    GFR (CKD-EPI) 84 >60 mL/min/1.73 m 2   TROPONIN   Result Value Ref Range    Troponin T 21 (H) 6 - 19 ng/L   Basic Metabolic Panel   Result Value Ref Range    Sodium 141 135 - 145 mmol/L    Potassium 4.3 3.6 - 5.5 mmol/L    Chloride 104 96 - 112 mmol/L    Co2 31 20 - 33 mmol/L    Glucose 92 65 - 99 mg/dL    Bun 13 8 - 22 mg/dL    Creatinine 0.98 0.50 - 1.40 mg/dL    Calcium 9.1 8.5 - 10.5 mg/dL    Anion Gap 6.0 (L) 7.0 - 16.0   ESTIMATED GFR   Result Value Ref Range    GFR (CKD-EPI) 71 >60 mL/min/1.73 m 2   EKG (NOW)   Result Value Ref Range    Report       [facility]  [place] [facility]    Test Date:  2022  Pt Name:    ADELINA MILLAN                Department: ER  MRN:        [id number]                      Room:       BL 14 H  Gender:     Female                       Technician: [id number]  :        1975                   Requested By:JORDON SWIFT  Order #:    [id number]                    Reading MD: JORDON SWIFT MD    Measurements  Intervals                                Axis  Rate:       77                           P:          52  PA:         140                          QRS:        65  QRSD:       84                           T:          37  QT:         368  QTc:        417    Interpretive Statements  SINUS RHYTHM, rate of 77, normal axis, no St elevation  Compared to ECG 2022 21:06:16  Atrial premature complex(es) no longer present  Electronically Signed On 2022 0:31:01 PDT by JORDON SWIFT MD        All labs reviewed by me.    EKG  12 Lead EKG interpreted by me as shown above.     RADIOLOGY  DX-CHEST-PORTABLE (1 VIEW)   Final Result      1.  No acute cardiac or pulmonary abnormalities are identified.        The radiologist's interpretation of all radiological studies have been reviewed by me.    COURSE & MEDICAL DECISION MAKING  Pertinent Labs & Imaging studies reviewed. (See chart for details)    9:16 PM - Patient seen and examined at bedside. Patient will be treated with Zofran 4 mg and albuterol inhaler. Ordered EKG, DX-Chest, Lipase, CMP, CBC w/ Diff, Troponin, UA, COVID Testing, Flu A/B, and RSV to evaluate her symptoms. The differential diagnoses include but are not limited to: COPD, asthma, electrolyte abnormalities, urinary tract infection, nausea, diarrhea, or COVID-19.      She was given a breathing treatment with that her wheezing is improved.  Discussed with her that her labs are unremarkable.  She feels better after Zofran.    Medical Decision Making: At this point time I think the patient has nausea likely  secondary to the doxycycline she is taking for her pneumonia.  Her labs are otherwise unremarkable.  She has no increase in her troponin and has no chest pain do not think she is having myocardial infarction.  I think her weakness is likely secondary to the diarrhea she is having which I think is secondary to the doxycycline she has been taking.  She otherwise does not appear septic.  Her labs are otherwise unremarkable we will discharge her home with Zofran to help with the nausea as well as Imodium.     The patient will return for new or worsening symptoms and is stable at the time of discharge.    The patient is referred to a primary physician for blood pressure management, diabetic screening, and for all other preventative health concerns.        DISPOSITION:  Patient will be discharged home in stable condition.    FOLLOW UP:  Mallika Sharp M.D.  [facility]   [address]  [facility] [address]  [phone]    Schedule an appointment as soon as possible for a visit in 2 days        OUTPATIENT MEDICATIONS:  Discharge Medication List as of 4/27/2022 12:55 AM        START taking these medications    Details   ondansetron (ZOFRAN ODT) 4 MG TABLET DISPERSIBLE Take 1 Tablet by mouth every 8 hours as needed for Nausea., Disp-10 Tablet, R-0, Normal      loperamide (IMODIUM) 2 MG Cap Take 1 Capsule by mouth 4 times a day as needed for Diarrhea., Disp-30 Capsule, R-0, Normal                FINAL IMPRESSION  1. Nausea    2. Moderate persistent asthma with acute exacerbation    3. Diarrhea, unspecified type          IChrissy (Ade), am scribing for, and in the presence of, José Miguel Stanton M.D.    Electronically signed by: Chrissy Tuttle (Ade), 4/26/2022    IJosé Miguel M.D. personally performed the services described in this documentation, as scribed by Chrissy Tuttle in my presence, and it is both accurate and complete. C    The note accurately reflects work and decisions made by me.   José Miguel Stanton M.D.  4/27/2022  2:08 AM

## 2022-05-02 ENCOUNTER — APPOINTMENT (OUTPATIENT)
Dept: RADIOLOGY | Facility: MEDICAL CENTER | Age: 47
End: 2022-05-02
Attending: EMERGENCY MEDICINE
Payer: MEDICAID

## 2022-05-02 ENCOUNTER — HOSPITAL ENCOUNTER (OUTPATIENT)
Facility: MEDICAL CENTER | Age: 47
End: 2022-05-04
Attending: EMERGENCY MEDICINE | Admitting: STUDENT IN AN ORGANIZED HEALTH CARE EDUCATION/TRAINING PROGRAM
Payer: MEDICAID

## 2022-05-02 DIAGNOSIS — R07.2 PRECORDIAL CHEST PAIN: ICD-10-CM

## 2022-05-02 DIAGNOSIS — M54.6 ACUTE RIGHT-SIDED THORACIC BACK PAIN: ICD-10-CM

## 2022-05-02 DIAGNOSIS — J96.12 CHRONIC RESPIRATORY FAILURE WITH HYPOXIA AND HYPERCAPNIA (HCC): ICD-10-CM

## 2022-05-02 DIAGNOSIS — J45.901 PERSISTENT ASTHMA WITH ACUTE EXACERBATION, UNSPECIFIED ASTHMA SEVERITY: ICD-10-CM

## 2022-05-02 DIAGNOSIS — E66.2 OBESITY HYPOVENTILATION SYNDROME (HCC): ICD-10-CM

## 2022-05-02 DIAGNOSIS — J96.11 CHRONIC RESPIRATORY FAILURE WITH HYPOXIA AND HYPERCAPNIA (HCC): ICD-10-CM

## 2022-05-02 LAB
BASOPHILS # BLD AUTO: 0.2 % (ref 0–1.8)
BASOPHILS # BLD: 0.02 K/UL (ref 0–0.12)
EOSINOPHIL # BLD AUTO: 0.06 K/UL (ref 0–0.51)
EOSINOPHIL NFR BLD: 0.6 % (ref 0–6.9)
ERYTHROCYTE [DISTWIDTH] IN BLOOD BY AUTOMATED COUNT: 51.5 FL (ref 35.9–50)
HCT VFR BLD AUTO: 38.3 % (ref 37–47)
HGB BLD-MCNC: 11.8 G/DL (ref 12–16)
IMM GRANULOCYTES # BLD AUTO: 0.04 K/UL (ref 0–0.11)
IMM GRANULOCYTES NFR BLD AUTO: 0.4 % (ref 0–0.9)
LYMPHOCYTES # BLD AUTO: 1.81 K/UL (ref 1–4.8)
LYMPHOCYTES NFR BLD: 17.4 % (ref 22–41)
MCH RBC QN AUTO: 26.1 PG (ref 27–33)
MCHC RBC AUTO-ENTMCNC: 30.8 G/DL (ref 33.6–35)
MCV RBC AUTO: 84.7 FL (ref 81.4–97.8)
MONOCYTES # BLD AUTO: 0.67 K/UL (ref 0–0.85)
MONOCYTES NFR BLD AUTO: 6.4 % (ref 0–13.4)
NEUTROPHILS # BLD AUTO: 7.82 K/UL (ref 2–7.15)
NEUTROPHILS NFR BLD: 75 % (ref 44–72)
NRBC # BLD AUTO: 0 K/UL
NRBC BLD-RTO: 0 /100 WBC
PLATELET # BLD AUTO: 255 K/UL (ref 164–446)
PMV BLD AUTO: 10.7 FL (ref 9–12.9)
RBC # BLD AUTO: 4.52 M/UL (ref 4.2–5.4)
WBC # BLD AUTO: 10.4 K/UL (ref 4.8–10.8)

## 2022-05-02 PROCEDURE — 83735 ASSAY OF MAGNESIUM: CPT

## 2022-05-02 PROCEDURE — 83690 ASSAY OF LIPASE: CPT

## 2022-05-02 PROCEDURE — 83605 ASSAY OF LACTIC ACID: CPT

## 2022-05-02 PROCEDURE — 96375 TX/PRO/DX INJ NEW DRUG ADDON: CPT

## 2022-05-02 PROCEDURE — 99285 EMERGENCY DEPT VISIT HI MDM: CPT

## 2022-05-02 PROCEDURE — 80053 COMPREHEN METABOLIC PANEL: CPT

## 2022-05-02 PROCEDURE — 36415 COLL VENOUS BLD VENIPUNCTURE: CPT

## 2022-05-02 PROCEDURE — 85610 PROTHROMBIN TIME: CPT

## 2022-05-02 PROCEDURE — 84443 ASSAY THYROID STIM HORMONE: CPT

## 2022-05-02 PROCEDURE — 93005 ELECTROCARDIOGRAM TRACING: CPT | Performed by: EMERGENCY MEDICINE

## 2022-05-02 PROCEDURE — 700105 HCHG RX REV CODE 258: Performed by: EMERGENCY MEDICINE

## 2022-05-02 PROCEDURE — 96374 THER/PROPH/DIAG INJ IV PUSH: CPT

## 2022-05-02 PROCEDURE — 84484 ASSAY OF TROPONIN QUANT: CPT

## 2022-05-02 PROCEDURE — 85025 COMPLETE CBC W/AUTO DIFF WBC: CPT

## 2022-05-02 PROCEDURE — 83880 ASSAY OF NATRIURETIC PEPTIDE: CPT

## 2022-05-02 PROCEDURE — 700111 HCHG RX REV CODE 636 W/ 250 OVERRIDE (IP): Performed by: EMERGENCY MEDICINE

## 2022-05-02 PROCEDURE — 85730 THROMBOPLASTIN TIME PARTIAL: CPT

## 2022-05-02 RX ORDER — SODIUM CHLORIDE 9 MG/ML
500 INJECTION, SOLUTION INTRAVENOUS ONCE
Status: COMPLETED | OUTPATIENT
Start: 2022-05-02 | End: 2022-05-03

## 2022-05-02 RX ORDER — ONDANSETRON 2 MG/ML
4 INJECTION INTRAMUSCULAR; INTRAVENOUS ONCE
Status: COMPLETED | OUTPATIENT
Start: 2022-05-02 | End: 2022-05-02

## 2022-05-02 RX ORDER — MORPHINE SULFATE 4 MG/ML
4 INJECTION INTRAVENOUS ONCE
Status: COMPLETED | OUTPATIENT
Start: 2022-05-02 | End: 2022-05-02

## 2022-05-02 RX ADMIN — MORPHINE SULFATE 4 MG: 4 INJECTION INTRAVENOUS at 23:54

## 2022-05-02 RX ADMIN — SODIUM CHLORIDE 500 ML: 9 INJECTION, SOLUTION INTRAVENOUS at 23:54

## 2022-05-02 RX ADMIN — ONDANSETRON 4 MG: 2 INJECTION INTRAMUSCULAR; INTRAVENOUS at 23:54

## 2022-05-02 ASSESSMENT — FIBROSIS 4 INDEX: FIB4 SCORE: 0.76

## 2022-05-03 ENCOUNTER — APPOINTMENT (OUTPATIENT)
Dept: RADIOLOGY | Facility: MEDICAL CENTER | Age: 47
End: 2022-05-03
Attending: EMERGENCY MEDICINE
Payer: MEDICAID

## 2022-05-03 PROBLEM — R07.9 CHEST PAIN: Status: ACTIVE | Noted: 2022-05-03

## 2022-05-03 PROBLEM — J96.22 ACUTE ON CHRONIC RESPIRATORY FAILURE WITH HYPOXIA AND HYPERCAPNIA (HCC): Status: ACTIVE | Noted: 2019-11-18

## 2022-05-03 PROBLEM — R07.9 CHEST PAIN: Status: RESOLVED | Noted: 2022-05-03 | Resolved: 2022-05-03

## 2022-05-03 PROBLEM — R53.1 WEAKNESS: Status: ACTIVE | Noted: 2019-11-18

## 2022-05-03 LAB
ALBUMIN SERPL BCP-MCNC: 3.5 G/DL (ref 3.2–4.9)
ALBUMIN SERPL BCP-MCNC: 3.8 G/DL (ref 3.2–4.9)
ALBUMIN/GLOB SERPL: 1.3 G/DL
ALP SERPL-CCNC: 146 U/L (ref 30–99)
ALT SERPL-CCNC: 11 U/L (ref 2–50)
ANION GAP SERPL CALC-SCNC: 10 MMOL/L (ref 7–16)
APTT PPP: 53.4 SEC (ref 24.7–36)
AST SERPL-CCNC: 12 U/L (ref 12–45)
BASE EXCESS BLDA CALC-SCNC: 0 MMOL/L (ref -4–3)
BILIRUB SERPL-MCNC: 0.5 MG/DL (ref 0.1–1.5)
BODY TEMPERATURE: 34.2 CENTIGRADE
BUN SERPL-MCNC: 16 MG/DL (ref 8–22)
BUN SERPL-MCNC: 19 MG/DL (ref 8–22)
CALCIUM SERPL-MCNC: 8.7 MG/DL (ref 8.5–10.5)
CALCIUM SERPL-MCNC: 9.1 MG/DL (ref 8.5–10.5)
CHLORIDE SERPL-SCNC: 103 MMOL/L (ref 96–112)
CHLORIDE SERPL-SCNC: 104 MMOL/L (ref 96–112)
CO2 SERPL-SCNC: 28 MMOL/L (ref 20–33)
CO2 SERPL-SCNC: 28 MMOL/L (ref 20–33)
CREAT SERPL-MCNC: 0.98 MG/DL (ref 0.5–1.4)
CREAT SERPL-MCNC: 1.05 MG/DL (ref 0.5–1.4)
GFR SERPLBLD CREATININE-BSD FMLA CKD-EPI: 65 ML/MIN/1.73 M 2
GFR SERPLBLD CREATININE-BSD FMLA CKD-EPI: 71 ML/MIN/1.73 M 2
GLOBULIN SER CALC-MCNC: 2.8 G/DL (ref 1.9–3.5)
GLUCOSE BLD STRIP.AUTO-MCNC: 103 MG/DL (ref 65–99)
GLUCOSE BLD STRIP.AUTO-MCNC: 113 MG/DL (ref 65–99)
GLUCOSE BLD STRIP.AUTO-MCNC: 142 MG/DL (ref 65–99)
GLUCOSE BLD STRIP.AUTO-MCNC: 174 MG/DL (ref 65–99)
GLUCOSE SERPL-MCNC: 115 MG/DL (ref 65–99)
GLUCOSE SERPL-MCNC: 164 MG/DL (ref 65–99)
HCO3 BLDA-SCNC: 28 MMOL/L (ref 17–25)
INR PPP: 2.72 (ref 0.87–1.13)
LACTATE BLD-SCNC: 1.4 MMOL/L (ref 0.5–2)
LIPASE SERPL-CCNC: 20 U/L (ref 11–82)
MAGNESIUM SERPL-MCNC: 2.1 MG/DL (ref 1.5–2.5)
NT-PROBNP SERPL IA-MCNC: 202 PG/ML (ref 0–125)
PCO2 BLDA: 56.8 MMHG (ref 26–37)
PH BLDA: 7.31 [PH] (ref 7.4–7.5)
PHOSPHATE SERPL-MCNC: 3.5 MG/DL (ref 2.5–4.5)
PO2 BLDA: 76.5 MMHG (ref 64–87)
POTASSIUM SERPL-SCNC: 3.8 MMOL/L (ref 3.6–5.5)
POTASSIUM SERPL-SCNC: 4.5 MMOL/L (ref 3.6–5.5)
PROT SERPL-MCNC: 6.3 G/DL (ref 6–8.2)
PROTHROMBIN TIME: 28 SEC (ref 12–14.6)
SAO2 % BLDA: 94.5 % (ref 93–99)
SODIUM SERPL-SCNC: 139 MMOL/L (ref 135–145)
SODIUM SERPL-SCNC: 142 MMOL/L (ref 135–145)
TROPONIN T SERPL-MCNC: 22 NG/L (ref 6–19)
TROPONIN T SERPL-MCNC: 24 NG/L (ref 6–19)
TROPONIN T SERPL-MCNC: 25 NG/L (ref 6–19)
TSH SERPL DL<=0.005 MIU/L-ACNC: 1.02 UIU/ML (ref 0.38–5.33)

## 2022-05-03 PROCEDURE — 700102 HCHG RX REV CODE 250 W/ 637 OVERRIDE(OP): Performed by: INTERNAL MEDICINE

## 2022-05-03 PROCEDURE — 700111 HCHG RX REV CODE 636 W/ 250 OVERRIDE (IP): Performed by: STUDENT IN AN ORGANIZED HEALTH CARE EDUCATION/TRAINING PROGRAM

## 2022-05-03 PROCEDURE — 94760 N-INVAS EAR/PLS OXIMETRY 1: CPT

## 2022-05-03 PROCEDURE — 99214 OFFICE O/P EST MOD 30 MIN: CPT | Mod: GC | Performed by: INTERNAL MEDICINE

## 2022-05-03 PROCEDURE — 700117 HCHG RX CONTRAST REV CODE 255: Performed by: EMERGENCY MEDICINE

## 2022-05-03 PROCEDURE — 93005 ELECTROCARDIOGRAM TRACING: CPT | Performed by: STUDENT IN AN ORGANIZED HEALTH CARE EDUCATION/TRAINING PROGRAM

## 2022-05-03 PROCEDURE — 82803 BLOOD GASES ANY COMBINATION: CPT

## 2022-05-03 PROCEDURE — A9270 NON-COVERED ITEM OR SERVICE: HCPCS | Performed by: STUDENT IN AN ORGANIZED HEALTH CARE EDUCATION/TRAINING PROGRAM

## 2022-05-03 PROCEDURE — 82962 GLUCOSE BLOOD TEST: CPT

## 2022-05-03 PROCEDURE — 99220 PR INITIAL OBSERVATION CARE,LEVL III: CPT | Performed by: STUDENT IN AN ORGANIZED HEALTH CARE EDUCATION/TRAINING PROGRAM

## 2022-05-03 PROCEDURE — 96375 TX/PRO/DX INJ NEW DRUG ADDON: CPT

## 2022-05-03 PROCEDURE — G0378 HOSPITAL OBSERVATION PER HR: HCPCS

## 2022-05-03 PROCEDURE — 96372 THER/PROPH/DIAG INJ SC/IM: CPT | Mod: XU

## 2022-05-03 PROCEDURE — 700111 HCHG RX REV CODE 636 W/ 250 OVERRIDE (IP): Performed by: INTERNAL MEDICINE

## 2022-05-03 PROCEDURE — 700101 HCHG RX REV CODE 250: Performed by: STUDENT IN AN ORGANIZED HEALTH CARE EDUCATION/TRAINING PROGRAM

## 2022-05-03 PROCEDURE — 700111 HCHG RX REV CODE 636 W/ 250 OVERRIDE (IP): Performed by: EMERGENCY MEDICINE

## 2022-05-03 PROCEDURE — 80069 RENAL FUNCTION PANEL: CPT

## 2022-05-03 PROCEDURE — A9270 NON-COVERED ITEM OR SERVICE: HCPCS | Performed by: INTERNAL MEDICINE

## 2022-05-03 PROCEDURE — 84484 ASSAY OF TROPONIN QUANT: CPT

## 2022-05-03 PROCEDURE — 94640 AIRWAY INHALATION TREATMENT: CPT

## 2022-05-03 PROCEDURE — 94660 CPAP INITIATION&MGMT: CPT

## 2022-05-03 PROCEDURE — 96375 TX/PRO/DX INJ NEW DRUG ADDON: CPT | Mod: XU

## 2022-05-03 PROCEDURE — 700102 HCHG RX REV CODE 250 W/ 637 OVERRIDE(OP): Performed by: STUDENT IN AN ORGANIZED HEALTH CARE EDUCATION/TRAINING PROGRAM

## 2022-05-03 PROCEDURE — 74175 CTA ABDOMEN W/CONTRAST: CPT

## 2022-05-03 RX ORDER — FUROSEMIDE 10 MG/ML
40 INJECTION INTRAMUSCULAR; INTRAVENOUS
Status: DISCONTINUED | OUTPATIENT
Start: 2022-05-03 | End: 2022-05-04 | Stop reason: HOSPADM

## 2022-05-03 RX ORDER — METHYLPREDNISOLONE SODIUM SUCCINATE 125 MG/2ML
62.5 INJECTION, POWDER, LYOPHILIZED, FOR SOLUTION INTRAMUSCULAR; INTRAVENOUS ONCE
Status: COMPLETED | OUTPATIENT
Start: 2022-05-03 | End: 2022-05-03

## 2022-05-03 RX ORDER — HEPARIN SODIUM 5000 [USP'U]/ML
5000 INJECTION, SOLUTION INTRAVENOUS; SUBCUTANEOUS EVERY 8 HOURS
Status: DISCONTINUED | OUTPATIENT
Start: 2022-05-03 | End: 2022-05-03

## 2022-05-03 RX ORDER — ACETAMINOPHEN 325 MG/1
650 TABLET ORAL EVERY 6 HOURS PRN
Status: DISCONTINUED | OUTPATIENT
Start: 2022-05-03 | End: 2022-05-04 | Stop reason: HOSPADM

## 2022-05-03 RX ORDER — OXYCODONE HYDROCHLORIDE 5 MG/1
5 TABLET ORAL
Status: DISCONTINUED | OUTPATIENT
Start: 2022-05-03 | End: 2022-05-03

## 2022-05-03 RX ORDER — ONDANSETRON 2 MG/ML
4 INJECTION INTRAMUSCULAR; INTRAVENOUS EVERY 4 HOURS PRN
Status: DISCONTINUED | OUTPATIENT
Start: 2022-05-03 | End: 2022-05-04 | Stop reason: HOSPADM

## 2022-05-03 RX ORDER — OMEPRAZOLE 20 MG/1
20 CAPSULE, DELAYED RELEASE ORAL DAILY
Status: DISCONTINUED | OUTPATIENT
Start: 2022-05-03 | End: 2022-05-04 | Stop reason: HOSPADM

## 2022-05-03 RX ORDER — FLUTICASONE PROPIONATE 50 MCG
1 SPRAY, SUSPENSION (ML) NASAL DAILY
Status: DISCONTINUED | OUTPATIENT
Start: 2022-05-03 | End: 2022-05-03

## 2022-05-03 RX ORDER — DEXTROSE MONOHYDRATE 25 G/50ML
25 INJECTION, SOLUTION INTRAVENOUS
Status: DISCONTINUED | OUTPATIENT
Start: 2022-05-03 | End: 2022-05-04 | Stop reason: HOSPADM

## 2022-05-03 RX ORDER — NALOXONE HYDROCHLORIDE 0.4 MG/ML
0.4 INJECTION, SOLUTION INTRAMUSCULAR; INTRAVENOUS; SUBCUTANEOUS ONCE
Status: CANCELLED | OUTPATIENT
Start: 2022-05-03 | End: 2022-05-03

## 2022-05-03 RX ORDER — IBUPROFEN 600 MG/1
600 TABLET ORAL EVERY 6 HOURS PRN
Status: DISCONTINUED | OUTPATIENT
Start: 2022-05-03 | End: 2022-05-03

## 2022-05-03 RX ORDER — IPRATROPIUM BROMIDE AND ALBUTEROL SULFATE 2.5; .5 MG/3ML; MG/3ML
3 SOLUTION RESPIRATORY (INHALATION)
Status: DISCONTINUED | OUTPATIENT
Start: 2022-05-03 | End: 2022-05-04 | Stop reason: HOSPADM

## 2022-05-03 RX ORDER — GABAPENTIN 300 MG/1
300 CAPSULE ORAL 3 TIMES DAILY
Status: DISCONTINUED | OUTPATIENT
Start: 2022-05-03 | End: 2022-05-04 | Stop reason: HOSPADM

## 2022-05-03 RX ORDER — ATORVASTATIN CALCIUM 20 MG/1
20 TABLET, FILM COATED ORAL
Status: DISCONTINUED | OUTPATIENT
Start: 2022-05-03 | End: 2022-05-04 | Stop reason: HOSPADM

## 2022-05-03 RX ORDER — TRAZODONE HYDROCHLORIDE 50 MG/1
100 TABLET ORAL
Status: DISCONTINUED | OUTPATIENT
Start: 2022-05-03 | End: 2022-05-04 | Stop reason: HOSPADM

## 2022-05-03 RX ORDER — AMOXICILLIN 250 MG
2 CAPSULE ORAL 2 TIMES DAILY
Status: DISCONTINUED | OUTPATIENT
Start: 2022-05-03 | End: 2022-05-04 | Stop reason: HOSPADM

## 2022-05-03 RX ORDER — OXYCODONE HYDROCHLORIDE 10 MG/1
10 TABLET ORAL
Status: DISCONTINUED | OUTPATIENT
Start: 2022-05-03 | End: 2022-05-03

## 2022-05-03 RX ORDER — PROCHLORPERAZINE EDISYLATE 5 MG/ML
5-10 INJECTION INTRAMUSCULAR; INTRAVENOUS EVERY 4 HOURS PRN
Status: DISCONTINUED | OUTPATIENT
Start: 2022-05-03 | End: 2022-05-04 | Stop reason: HOSPADM

## 2022-05-03 RX ORDER — HYDROMORPHONE HYDROCHLORIDE 1 MG/ML
1 INJECTION, SOLUTION INTRAMUSCULAR; INTRAVENOUS; SUBCUTANEOUS ONCE
Status: COMPLETED | OUTPATIENT
Start: 2022-05-03 | End: 2022-05-03

## 2022-05-03 RX ORDER — GUAIFENESIN/DEXTROMETHORPHAN 100-10MG/5
10 SYRUP ORAL EVERY 6 HOURS PRN
Status: DISCONTINUED | OUTPATIENT
Start: 2022-05-03 | End: 2022-05-04 | Stop reason: HOSPADM

## 2022-05-03 RX ORDER — ONDANSETRON 4 MG/1
4 TABLET, ORALLY DISINTEGRATING ORAL EVERY 4 HOURS PRN
Status: DISCONTINUED | OUTPATIENT
Start: 2022-05-03 | End: 2022-05-04 | Stop reason: HOSPADM

## 2022-05-03 RX ORDER — BISACODYL 10 MG
10 SUPPOSITORY, RECTAL RECTAL
Status: DISCONTINUED | OUTPATIENT
Start: 2022-05-03 | End: 2022-05-04 | Stop reason: HOSPADM

## 2022-05-03 RX ORDER — PROMETHAZINE HYDROCHLORIDE 25 MG/1
12.5-25 TABLET ORAL EVERY 4 HOURS PRN
Status: DISCONTINUED | OUTPATIENT
Start: 2022-05-03 | End: 2022-05-04 | Stop reason: HOSPADM

## 2022-05-03 RX ORDER — WARFARIN SODIUM 6 MG/1
6 TABLET ORAL DAILY
Status: DISCONTINUED | OUTPATIENT
Start: 2022-05-03 | End: 2022-05-04 | Stop reason: HOSPADM

## 2022-05-03 RX ORDER — PROMETHAZINE HYDROCHLORIDE 25 MG/1
12.5-25 SUPPOSITORY RECTAL EVERY 4 HOURS PRN
Status: DISCONTINUED | OUTPATIENT
Start: 2022-05-03 | End: 2022-05-04 | Stop reason: HOSPADM

## 2022-05-03 RX ORDER — NALOXONE HYDROCHLORIDE 0.4 MG/ML
0.4 INJECTION, SOLUTION INTRAMUSCULAR; INTRAVENOUS; SUBCUTANEOUS ONCE
Status: COMPLETED | OUTPATIENT
Start: 2022-05-03 | End: 2022-05-03

## 2022-05-03 RX ORDER — LIDOCAINE 50 MG/G
1 PATCH TOPICAL EVERY 24 HOURS
Status: DISCONTINUED | OUTPATIENT
Start: 2022-05-03 | End: 2022-05-03

## 2022-05-03 RX ORDER — MORPHINE SULFATE 4 MG/ML
4 INJECTION INTRAVENOUS
Status: DISCONTINUED | OUTPATIENT
Start: 2022-05-03 | End: 2022-05-03

## 2022-05-03 RX ORDER — SPIRONOLACTONE 25 MG/1
25 TABLET ORAL DAILY
Status: DISCONTINUED | OUTPATIENT
Start: 2022-05-03 | End: 2022-05-04 | Stop reason: HOSPADM

## 2022-05-03 RX ORDER — BUDESONIDE AND FORMOTEROL FUMARATE DIHYDRATE 160; 4.5 UG/1; UG/1
2 AEROSOL RESPIRATORY (INHALATION)
Status: DISCONTINUED | OUTPATIENT
Start: 2022-05-03 | End: 2022-05-04 | Stop reason: HOSPADM

## 2022-05-03 RX ORDER — FAMOTIDINE 20 MG/1
40 TABLET, FILM COATED ORAL DAILY
Status: DISCONTINUED | OUTPATIENT
Start: 2022-05-03 | End: 2022-05-04 | Stop reason: HOSPADM

## 2022-05-03 RX ORDER — HYDRALAZINE HYDROCHLORIDE 20 MG/ML
10 INJECTION INTRAMUSCULAR; INTRAVENOUS EVERY 4 HOURS PRN
Status: DISCONTINUED | OUTPATIENT
Start: 2022-05-03 | End: 2022-05-04 | Stop reason: HOSPADM

## 2022-05-03 RX ORDER — POLYETHYLENE GLYCOL 3350 17 G/17G
1 POWDER, FOR SOLUTION ORAL
Status: DISCONTINUED | OUTPATIENT
Start: 2022-05-03 | End: 2022-05-04 | Stop reason: HOSPADM

## 2022-05-03 RX ORDER — ARIPIPRAZOLE 15 MG/1
30 TABLET ORAL EVERY MORNING
Status: DISCONTINUED | OUTPATIENT
Start: 2022-05-03 | End: 2022-05-04 | Stop reason: HOSPADM

## 2022-05-03 RX ORDER — ALBUTEROL SULFATE 90 UG/1
2 AEROSOL, METERED RESPIRATORY (INHALATION) EVERY 6 HOURS PRN
Status: DISCONTINUED | OUTPATIENT
Start: 2022-05-03 | End: 2022-05-03

## 2022-05-03 RX ORDER — NITROGLYCERIN 0.4 MG/1
0.4 TABLET SUBLINGUAL
Status: DISCONTINUED | OUTPATIENT
Start: 2022-05-03 | End: 2022-05-04 | Stop reason: HOSPADM

## 2022-05-03 RX ADMIN — IOHEXOL 70 ML: 350 INJECTION, SOLUTION INTRAVENOUS at 00:49

## 2022-05-03 RX ADMIN — BUDESONIDE AND FORMOTEROL FUMARATE DIHYDRATE 2 PUFF: 160; 4.5 AEROSOL RESPIRATORY (INHALATION) at 20:45

## 2022-05-03 RX ADMIN — SPIRONOLACTONE 25 MG: 25 TABLET, FILM COATED ORAL at 09:40

## 2022-05-03 RX ADMIN — ARIPIPRAZOLE 30 MG: 15 TABLET ORAL at 09:40

## 2022-05-03 RX ADMIN — IPRATROPIUM BROMIDE AND ALBUTEROL SULFATE 3 ML: .5; 3 SOLUTION RESPIRATORY (INHALATION) at 20:35

## 2022-05-03 RX ADMIN — FUROSEMIDE 40 MG: 10 INJECTION, SOLUTION INTRAMUSCULAR; INTRAVENOUS at 07:48

## 2022-05-03 RX ADMIN — IPRATROPIUM BROMIDE AND ALBUTEROL SULFATE 3 ML: .5; 3 SOLUTION RESPIRATORY (INHALATION) at 10:21

## 2022-05-03 RX ADMIN — GABAPENTIN 300 MG: 300 CAPSULE ORAL at 09:40

## 2022-05-03 RX ADMIN — WARFARIN SODIUM 6 MG: 6 TABLET ORAL at 19:02

## 2022-05-03 RX ADMIN — TIOTROPIUM BROMIDE INHALATION SPRAY 2.5 MCG: 3.12 SPRAY, METERED RESPIRATORY (INHALATION) at 09:41

## 2022-05-03 RX ADMIN — IPRATROPIUM BROMIDE AND ALBUTEROL SULFATE 3 ML: .5; 3 SOLUTION RESPIRATORY (INHALATION) at 04:05

## 2022-05-03 RX ADMIN — INSULIN HUMAN 1 UNITS: 100 INJECTION, SOLUTION PARENTERAL at 09:49

## 2022-05-03 RX ADMIN — METHYLPREDNISOLONE SODIUM SUCCINATE 62.5 MG: 125 INJECTION, POWDER, FOR SOLUTION INTRAMUSCULAR; INTRAVENOUS at 02:39

## 2022-05-03 RX ADMIN — FAMOTIDINE 40 MG: 20 TABLET ORAL at 07:48

## 2022-05-03 RX ADMIN — IPRATROPIUM BROMIDE AND ALBUTEROL SULFATE 3 ML: .5; 3 SOLUTION RESPIRATORY (INHALATION) at 06:52

## 2022-05-03 RX ADMIN — NALOXONE HYDROCHLORIDE 0.4 MG: 0.4 INJECTION, SOLUTION INTRAMUSCULAR; INTRAVENOUS; SUBCUTANEOUS at 17:54

## 2022-05-03 RX ADMIN — OXYCODONE 10 MG: 5 TABLET ORAL at 07:48

## 2022-05-03 RX ADMIN — ATORVASTATIN CALCIUM 20 MG: 20 TABLET, FILM COATED ORAL at 18:31

## 2022-05-03 RX ADMIN — HYDROMORPHONE HYDROCHLORIDE 1 MG: 1 INJECTION, SOLUTION INTRAMUSCULAR; INTRAVENOUS; SUBCUTANEOUS at 02:39

## 2022-05-03 ASSESSMENT — ENCOUNTER SYMPTOMS
DIAPHORESIS: 0
HEADACHES: 0
BRUISES/BLEEDS EASILY: 0
PALPITATIONS: 0
NAUSEA: 0
BACK PAIN: 1
FLANK PAIN: 0
DEPRESSION: 0
FEVER: 0
DIZZINESS: 0
BACK PAIN: 0
SPUTUM PRODUCTION: 0
NECK PAIN: 0
FALLS: 0
VOMITING: 0
WHEEZING: 0
DOUBLE VISION: 0
SEIZURES: 0
CHILLS: 0
COUGH: 0
SORE THROAT: 0
MYALGIAS: 0
BLURRED VISION: 0
BLOOD IN STOOL: 0
MYALGIAS: 1
HEARTBURN: 0
DIARRHEA: 0
SPEECH CHANGE: 0
ABDOMINAL PAIN: 0
FOCAL WEAKNESS: 0
SHORTNESS OF BREATH: 0

## 2022-05-03 ASSESSMENT — LIFESTYLE VARIABLES
ON A TYPICAL DAY WHEN YOU DRINK ALCOHOL HOW MANY DRINKS DO YOU HAVE: 0
TOTAL SCORE: 0
ALCOHOL_USE: NO
CONSUMPTION TOTAL: NEGATIVE
SUBSTANCE_ABUSE: 0
HAVE YOU EVER FELT YOU SHOULD CUT DOWN ON YOUR DRINKING: NO
EVER HAD A DRINK FIRST THING IN THE MORNING TO STEADY YOUR NERVES TO GET RID OF A HANGOVER: NO
HOW MANY TIMES IN THE PAST YEAR HAVE YOU HAD 5 OR MORE DRINKS IN A DAY: 0
TOTAL SCORE: 0
AVERAGE NUMBER OF DAYS PER WEEK YOU HAVE A DRINK CONTAINING ALCOHOL: 0
DOES PATIENT WANT TO STOP DRINKING: NO
TOTAL SCORE: 0
EVER FELT BAD OR GUILTY ABOUT YOUR DRINKING: NO
HAVE PEOPLE ANNOYED YOU BY CRITICIZING YOUR DRINKING: NO

## 2022-05-03 ASSESSMENT — CHA2DS2 SCORE
AGE 75 OR GREATER: NO
DIABETES: NO
SEX: MALE
AGE 65 TO 74: NO
VASCULAR DISEASE: YES
PRIOR STROKE OR TIA OR THROMBOEMBOLISM: NO
CHF OR LEFT VENTRICULAR DYSFUNCTION: YES
HYPERTENSION: NO
CHA2DS2 VASC SCORE: 2

## 2022-05-03 ASSESSMENT — FIBROSIS 4 INDEX: FIB4 SCORE: 0.68

## 2022-05-03 ASSESSMENT — PAIN DESCRIPTION - PAIN TYPE: TYPE: ACUTE PAIN

## 2022-05-03 NOTE — ED TRIAGE NOTES
.  Chief Complaint   Patient presents with   • Back Pain   • N/V      Pt BIB EMS with above complaints. Pt notes chronic back and leg pain. One episode of emesis during transport, Pt given Zofran ODT.    Pt was seen here 4/26 for general malaise, notes she completed her antibiotics and feels a little bit better.

## 2022-05-03 NOTE — H&P
Hospital Medicine History & Physical Note    Date of Service  5/3/2022    Primary Care Physician  Mallika Sharp M.D.    Consultants  None    Code Status  Full Code    Chief Complaint  Chief Complaint   Patient presents with   • Back Pain   • N/V       History of Presenting Illness  Lorene Haro is a 46 y.o. obese female with history of A. fib on warfarin, OHS/KATHIA, chronic proximal respiratory failure dependent on 3 L, who presented 5/2/2022 with complaint of chest pain and back pain.  She had multiple hospitalization for similar complaint, recently had negative MPS and echo noting 70% EF.  In ER found to have minimally elevated troponin T which is chronically elevated.  Non specific ST changes on EKG.  Due to complaint of persistent chest and back pain, admission requested by ERP.  Admitted for observation.    I discussed the plan of care with patient and bedside RN.    Review of Systems  Review of Systems   Constitutional: Negative for chills and fever.   HENT: Negative for hearing loss and tinnitus.    Eyes: Negative for blurred vision and double vision.   Respiratory: Negative for cough and shortness of breath.    Cardiovascular: Positive for chest pain and leg swelling.   Gastrointestinal: Negative for abdominal pain, heartburn, nausea and vomiting.   Genitourinary: Negative for dysuria, flank pain and hematuria.   Musculoskeletal: Positive for back pain and myalgias. Negative for falls.   Skin: Negative for itching and rash.   Neurological: Negative for dizziness, speech change, focal weakness and headaches.   Endo/Heme/Allergies: Negative for environmental allergies. Does not bruise/bleed easily.   Psychiatric/Behavioral: Negative for depression and substance abuse.   All other systems reviewed and are negative.      Past Medical History   has a past medical history of A-fib ([facility]), Asthma, Bipolar 2 disorder (HCC), Chickenpox, Chronic obstructive pulmonary disease (HCC), Hypertension, On home oxygen  therapy, Other psychological stress, Schizophrenic disorder (HCC), and Yeast infection of the skin (4/1/2021).    Surgical History   has a past surgical history that includes hysterectomy laparoscopy; colectomy; cholecystectomy; and knee arthroscopy (Left).     Family History  family history includes Cancer in her sister; No Known Problems in her mother.   Family history reviewed with patient. There is no family history that is pertinent to the chief complaint.     Social History   reports that she has quit smoking. Her smoking use included cigarettes. She started smoking about 4 weeks ago. She quit after 20.00 years of use. She has never used smokeless tobacco. She reports that she does not drink alcohol and does not use drugs.    Allergies  Allergies   Allergen Reactions   • Penicillin G Rash and Itching     pt reports that she gets a rash all over her body and gets itchy   • Amoxicillin Rash and Itching     Tolerates cephalosporins, pt reports that she gets a rash all over her body and gets itchy       Medications  Prior to Admission Medications   Prescriptions Last Dose Informant Patient Reported? Taking?   acetaminophen (TYLENOL) 325 MG Tab  Patient Yes No   Sig: Take 325 mg by mouth every 8 hours as needed (Generalized pain). Prescription is for 4 tabs every 8 hours prn, patient states she takes 2 tabls every 4 hours prn   albuterol 108 (90 Base) MCG/ACT Aero Soln inhalation aerosol  Patient No No   Sig: Inhale 2 Puffs every 6 hours as needed for Shortness of Breath.   aripiprazole (ABILIFY) 30 MG tablet  Patient Yes No   Sig: Take 30 mg by mouth every morning.   atorvastatin (LIPITOR) 20 MG Tab  Patient Yes No   Sig: Take 20 mg by mouth at bedtime.   budesonide-formoterol (SYMBICORT) 160-4.5 MCG/ACT Aerosol  Patient No No   Sig: Inhale 2 Puffs 2 times a day.   doxycycline (MONODOX) 100 MG capsule   No No   Sig: Take 1 Capsule by mouth 2 times a day.   famotidine (PEPCID) 40 MG Tab  Patient Yes No   Sig: Take  40 mg by mouth every day. Indications: Heartburn   fluticasone (FLONASE) 50 MCG/ACT nasal spray   No No   Sig: ADMOINISTER 1 TO 2 SPRAYS INTO AFFECTED NOSTRIL(S) TWICE DAILY   furosemide (LASIX) 40 MG Tab   No No   Sig: Take 1 Tablet by mouth every day.   gabapentin (NEURONTIN) 300 MG Cap 5/2/2022 at Unknown time  No No   Sig: Take 1 Capsule by mouth 3 times a day.   ketoconazole (NIZORAL) 2 % Cream   No No   Sig: Apply 1 g topically every day.   lidocaine (LIDODERM) 5 % Patch   No No   Sig: Place 1 Patch on the skin every 24 hours.   loperamide (IMODIUM) 2 MG Cap   No No   Sig: Take 1 Capsule by mouth 4 times a day as needed for Diarrhea.   montelukast (SINGULAIR) 10 MG Tab  Patient No No   Sig: TAKE 1 TABLET BY MOUTH EVERY NIGHT AT BEDTIME   nitroglycerin (NITROSTAT) 0.4 MG SL Tab  Patient No No   Sig: Place 1 tablet under the tongue as needed for Chest Pain.   nystatin (MYCOSTATIN) 191871 UNIT/GM Ointment   No No   Sig: Apply 1 g topically 2 times a day.   ondansetron (ZOFRAN ODT) 4 MG TABLET DISPERSIBLE 5/2/2022 at Unknown time  No No   Sig: Take 1 Tablet by mouth every 8 hours as needed for Nausea.   selenium sulfide 2.5% (SELSUN) 2.5 % Lotion   No No   Sig: Apply to scalp once daily   spironolactone (ALDACTONE) 25 MG Tab   No No   Sig: Take 1 Tablet by mouth every day.   tiotropium (SPIRIVA HANDIHALER) 18 MCG Cap  Patient No No   Sig: Place 1 Capsule into inhaler and inhale every day.   traZODone (DESYREL) 50 MG Tab  Patient Yes No   Sig: Take 100 mg by mouth at bedtime.   warfarin (COUMADIN) 6 MG Tab   No No   Sig: Take one to one and one-half (1-1.5) tablets daily as directed by [facility]   Patient taking differently: Take 6 mg by mouth every evening.      Facility-Administered Medications: None       Physical Exam  Pulse:  [79-83] 79  Resp:  [16-18] 18  BP: (114-118)/(54-75) 118/54  SpO2:  [96 %-100 %] 96 %  Blood Pressure: 118/54       Pulse: 79   Respiration: 18   Pulse Oximetry: 96  %       Physical Exam  Vitals and nursing note reviewed.   Constitutional:       General: She is not in acute distress.     Appearance: She is obese.   HENT:      Head: Normocephalic and atraumatic.      Nose: Nose normal.      Mouth/Throat:      Mouth: Mucous membranes are moist.      Pharynx: Oropharynx is clear.   Eyes:      General: No scleral icterus.     Extraocular Movements: Extraocular movements intact.   Cardiovascular:      Rate and Rhythm: Normal rate and regular rhythm.      Pulses: Normal pulses.      Heart sounds:     No friction rub.   Pulmonary:      Effort: Pulmonary effort is normal. No respiratory distress.      Breath sounds: No stridor. No wheezing.      Comments: Decreased bibasilar breath sounds  Abdominal:      General: Abdomen is flat. Bowel sounds are normal. There is distension.      Tenderness: There is no abdominal tenderness. There is no guarding or rebound.   Musculoskeletal:         General: No tenderness or signs of injury. Normal range of motion.      Cervical back: Neck supple. No tenderness.      Comments: Chronic venous stasis appearance of lower extremities  +1 pitting edema   Skin:     General: Skin is warm and dry.   Neurological:      General: No focal deficit present.      Mental Status: She is alert and oriented to person, place, and time.   Psychiatric:         Mood and Affect: Mood normal.         Laboratory:  Recent Labs     05/02/22  2345   WBC 10.4   RBC 4.52   HEMOGLOBIN 11.8*   HEMATOCRIT 38.3   MCV 84.7   MCH 26.1*   MCHC 30.8*   RDW 51.5*   PLATELETCT 255   MPV 10.7     Recent Labs     05/02/22  2345   SODIUM 142   POTASSIUM 3.8   CHLORIDE 104   CO2 28   GLUCOSE 115*   BUN 16   CREATININE 0.98   CALCIUM 8.7     Recent Labs     05/02/22  2345   ALTSGPT 11   ASTSGOT 12   ALKPHOSPHAT 146*   TBILIRUBIN 0.5   LIPASE 20   GLUCOSE 115*     Recent Labs     05/02/22  2345   APTT 53.4*   INR 2.72*     Recent Labs     05/02/22  2345   NTPROBNP 202*         Recent Labs      05/02/22  2345   TROPONINT 25*       Imaging:  CT-CTA COMPLETE THORACOABDOMINAL AORTA   Final Result      No thoracic or abdominal aortic aneurysm or dissection.                               X-Ray:  I have personally reviewed the images and compared with prior images.  EKG:  I have personally reviewed the images and compared with prior images.    Assessment/Plan:  Justification for Admission Status  I anticipate this patient is appropriate for observation status at this time    * Pain in the chest- (present on admission)  Assessment & Plan  Likely MSK, anxiety contributory  Trend CE, EKG  TTE 2/2022: 70%EF  Negative MPS 2/2022  CTA thoracoabdomen: No dissection    Obesity hypoventilation syndrome (HCC)- (present on admission)  Assessment & Plan  Dependent on 3 L -at baseline    HLD (hyperlipidemia)- (present on admission)  Assessment & Plan  statin    Lower extremity edema- (present on admission)  Assessment & Plan  diuresis    A-fib ([facility])  Assessment & Plan  OAC: warfarin, dosing as per pharmacy    Schizophrenia ([facility])- (present on admission)  Assessment & Plan  Continue home medications    Class 3 severe obesity in adult ([facility])- (present on admission)  Assessment & Plan  Diet and lifestyle modification    KATHIA (obstructive sleep apnea)- (present on admission)  Assessment & Plan  CPAP      VTE prophylaxis: Warfarin, dosing as per pharmacy

## 2022-05-03 NOTE — PROGRESS NOTES
1330 Pt picked up from ER on tele. Pt on 2L NC. Pt talking, oriented. Walked to bed, vs obtained. No further needs at this time. MASD noted in groin and under bilat breasts  1500 Pt asleep in bed, denies needs. Pt more sleepy, states she is just tired. BS obtained, no coverage needed. Called lab to obtain ABG that is ordred.  1530 Called lab again to draw abg d/t pt increasingly drowsy. Sternal rub patient, she wakes up, answers questions appropriately but falls back asleep quickly  1600 Called Lab to draw blood, pt more drowsy, sternal rub   1650 Critical CO2 on ABG 56.8, Text to hospitalist Dr. Esteban - He stated he will discuss with intensivist.  1715 Eulalia Stanford at bedside to evaluate patient.  1754 Narcan given per Dr. Esteban.  1808 Pt more alert, talking, able to keep eyes open and have conversation. Pt states she wants to eat and is having pain. Educated pt that we aren't going to give her any narcotics and going to hold off on eating until its determined if she is going to be on bipap or not.   1825 Pt starting to get more drowsy - having to awaken pt with sternal rub. Pt yells at me for being rough, but then falls right back to sleep.   1915 Report given to oncoming RN. Aware of pts respiratory and neuro status. Encouraged to follow up.

## 2022-05-03 NOTE — PROGRESS NOTES
Inpatient Anticoagulation Service Note    Date: 5/3/2022    Reason for Anticoagulation: Atrial Fibrillation   Target INR: 2.0 to 3.0  [id number] VASc Score: 2  HAS-BLED Score: 0   Hemoglobin Value: (!) 11.8  Hematocrit Value: 38.3  Lab Platelet Value: 255    INR from last 7 days     Date/Time INR Value    22 23:45:01 2.72        Dose from last 7 days     Date/Time Dose (mg)    22 1539 6        Average Dose (mg): 6  Significant Interactions: Statin    Reversal Agent Administered: Not Applicable    Comments: Patient with CC of back pain n/v and a PMH of afib on warfarin (goal INR 2-3) and chronic resp failure (on 3L O2 @ home). Patient previously dosed within our institution at 6 mg daily to which they therapeutic throughout their stay. H/h stable, diet ordered, DDIs listed above. Today INR WNL and the patient missed yesterday's warfarin dose.    Plan: continue 6 mg daily warfarin dosing and check daily INR to ensure therapeutic.    Education Material Provided?: No (Chronic warfarin patient)    Pharmacist suggested discharge dosin mg daily assuming patient therapeutic on this dosing. Patient to follow up in clinic within a week.    Jose Arita, PharmD, PGY2 Critical Care Pharmacy Resident

## 2022-05-03 NOTE — PROGRESS NOTES
Hospital Medicine Daily Progress Note    Date of Service  5/3/2022    Chief Complaint  Lorene Haro is a 46 y.o. female admitted 5/2/2022 with chest pain, back pain, lower ext edema and shortness of breath    Hospital Course  46-year-old female with a past medical history of obesity, obstructive sleep apnea, chronic respiratory failure on 3 L of oxygen, atrial fibrillation on warfarin who presented with chest pain, back pain, LE edema and shortness of breath.  Patient had recent nuclear medicine stress test on 2/27/2022 that was negative and 2D echo that revealed LVEF 70% with no regional wall motion abnormality and estimated right ventricular systolic pressure of 35.    Interval Problem Update  Pt reports on going shortness of breath. Diminished breath sounds noted. Continue duoneb breathing treatments and IVlasix.  Troponins are flat. Pt reports generalized weakness, ordered PT OT eval.    Patient became more somnolent through out the day. She received IV Dilaudid and oxy 10 in the ER. ABG reveals hypercapnic respiratory failure with ph 7.3, pco2 56.8. DC narcotic. Patient given IV Narcan with improvement in her mentation. Discussed case with Dr Gonda, if patient develops worsening symptoms will consider transfer to ICU for bipap     I have personally seen and examined the patient at bedside. I discussed the plan of care with patient.    Consultants/Specialty  None    Code Status  Full Code    Disposition  Patient is not medically cleared for discharge.   Anticipate discharge to to home with close outpatient follow-up.  I have placed the appropriate orders for post-discharge needs.    Review of Systems  Review of Systems   Constitutional: Negative for chills, diaphoresis and fever.   HENT: Negative for hearing loss and sore throat.    Eyes: Negative for blurred vision.   Respiratory: Negative for cough, sputum production, shortness of breath and wheezing.    Cardiovascular: Positive for chest pain and leg  swelling. Negative for palpitations.   Gastrointestinal: Negative for abdominal pain, blood in stool, diarrhea, nausea and vomiting.   Genitourinary: Negative for dysuria, flank pain and urgency.   Musculoskeletal: Negative for back pain, joint pain, myalgias and neck pain.   Skin: Negative for rash.   Neurological: Negative for dizziness, focal weakness, seizures and headaches.   Endo/Heme/Allergies: Does not bruise/bleed easily.   Psychiatric/Behavioral: Negative for suicidal ideas.   All other systems reviewed and are negative.       Physical Exam  Temp:  [36.3 °C (97.3 °F)] 36.3 °C (97.3 °F)  Pulse:  [71-84] 83  Resp:  [16-27] 22  BP: (105-138)/(52-75) 131/61  SpO2:  [90 %-100 %] 95 %    Physical Exam  Vitals and nursing note reviewed.   Constitutional:       General: She is not in acute distress.     Appearance: She is obese.   HENT:      Head: Normocephalic and atraumatic.      Nose: Nose normal.      Mouth/Throat:      Mouth: Mucous membranes are moist.   Eyes:      Extraocular Movements: Extraocular movements intact.      Conjunctiva/sclera: Conjunctivae normal.      Pupils: Pupils are equal, round, and reactive to light.   Cardiovascular:      Rate and Rhythm: Normal rate and regular rhythm.      Pulses: Normal pulses.      Heart sounds: Normal heart sounds.   Pulmonary:      Breath sounds: No wheezing, rhonchi or rales.      Comments: Diminished breath sounds  Abdominal:      General: Bowel sounds are normal. There is no distension.      Palpations: Abdomen is soft.      Tenderness: There is no abdominal tenderness.   Musculoskeletal:         General: Swelling present. No tenderness.      Cervical back: Normal range of motion and neck supple.      Right lower leg: Edema present.      Left lower leg: Edema present.   Lymphadenopathy:      Cervical: No cervical adenopathy.   Skin:     General: Skin is warm.      Coloration: Skin is not jaundiced.      Findings: No rash.   Neurological:      General: No focal  deficit present.      Mental Status: She is oriented to person, place, and time.      Cranial Nerves: No cranial nerve deficit.      Motor: No weakness.      Comments: Somnolent   Psychiatric:         Mood and Affect: Mood normal.         Behavior: Behavior normal.         Fluids    Intake/Output Summary (Last 24 hours) at 5/3/2022 1246  Last data filed at 5/3/2022 0112  Gross per 24 hour   Intake 500 ml   Output --   Net 500 ml       Laboratory  Recent Labs     05/02/22  2345   WBC 10.4   RBC 4.52   HEMOGLOBIN 11.8*   HEMATOCRIT 38.3   MCV 84.7   MCH 26.1*   MCHC 30.8*   RDW 51.5*   PLATELETCT 255   MPV 10.7     Recent Labs     05/02/22  2345 05/03/22  0717   SODIUM 142 139   POTASSIUM 3.8 4.5   CHLORIDE 104 103   CO2 28 28   GLUCOSE 115* 164*   BUN 16 19   CREATININE 0.98 1.05   CALCIUM 8.7 9.1     Recent Labs     05/02/22  2345   APTT 53.4*   INR 2.72*               Imaging  CT-CTA COMPLETE THORACOABDOMINAL AORTA   Final Result      No thoracic or abdominal aortic aneurysm or dissection.                                Assessment/Plan  * Pain in the chest- (present on admission)  Assessment & Plan  Likely MSK, anxiety contributory  Trend CE, EKG  TTE 2/2022: 70%EF  Negative MPS 2/2022  CTA thoracoabdomen: No dissection    Obesity hypoventilation syndrome (HCC)- (present on admission)  Assessment & Plan  Dependent on 3 L -at baseline    HLD (hyperlipidemia)- (present on admission)  Assessment & Plan  statin    Lower extremity edema- (present on admission)  Assessment & Plan  diuresis    A-fib ([facility])  Assessment & Plan  OAC: warfarin, dosing as per pharmacy    Schizophrenia ([facility])- (present on admission)  Assessment & Plan  Continue home medications    Class 3 severe obesity in adult ([facility])- (present on admission)  Assessment & Plan  Diet and lifestyle modification    Weakness- (present on admission)  Assessment & Plan  PT OT    Acute on chronic respiratory failure with hypoxia and hypercapnia ([facility])- (present on  admission)  Assessment & Plan  In the setting of COPD, KATHIA/OHS and narcotics  IV narcan PRN  Bipap therapy if patient develops worsening symptoms    KATHIA (obstructive sleep apnea)- (present on admission)  Assessment & Plan  CPAP       VTE prophylaxis: heparin ppx    I have performed a physical exam and reviewed and updated ROS and Plan today (5/3/2022). In review of yesterday's note (5/2/2022), there are no changes except as documented above.

## 2022-05-03 NOTE — ED NOTES
Pt ambulated to bathroom while using her walker. Pt back in room and in bed. Tolerated well. Denies any other needs at this time.

## 2022-05-03 NOTE — ED NOTES
Med Rec complete per Pt at bedside. Pt is taking Warfarin 6mg daily, Pt reports that she missed her dose 5/2/22.  Allergies reviewed.  Home Pharmacy:  [facility]

## 2022-05-03 NOTE — ASSESSMENT & PLAN NOTE
Likely MSK, anxiety contributory  Trend CE, EKG  TTE 2/2022: 70%EF  Negative MPS 2/2022  CTA thoracoabdomen: No dissection

## 2022-05-03 NOTE — ED PROVIDER NOTES
"ED Provider Note    ED Provider Note    Scribed for Kirsty Perkins MD by Kirsty Perkins M.D.. 5/2/2022, 11:17 PM.    Primary care provider: Mallika Sharp M.D.  Means of arrival: EMS  History obtained from: Patient  History limited by: None    CHIEF COMPLAINT  Chief Complaint   Patient presents with   • Back Pain   • N/V       HPI  Lorene Haro is a 46 y.o. female who presents to the Emergency Department for evaluation of chest pain radiating to her back.  Patient notes she had this starting yesterday without trauma.  Is localized to the precordium on the right side of the chest with radiation to the right thoracic region of the back.  She notes no bowel or bladder changes, she notes generalized fatigue and malaise and sensation of weakness in both her legs but no new numbness.  She does have history of asthma and is chronically on oxygen, 3 L at home.  She is thankfully not hypoxic on her usual 3 L.  She was diagnosed with \"walking pneumonia\" last month and completed a course of antibiotics.  She notes nonetheless since then she has been feeling generalized fatigue and malaise but has had no acute fever.  She notes feeling generally ill today with nausea and a single episode of emesis while on route by EMS.  Given the pain involves her chest she was concern for potential heart problem, she has history of A. fib and is anticoagulated on warfarin, as such she came to be assessed.    REVIEW OF SYSTEMS  Pertinent positives include chest pain, back pain, dyspnea, fatigue. Pertinent negatives include no acute trauma, no documented fever, no vomiting.  All other systems reviewed and negative.    PAST MEDICAL HISTORY   has a past medical history of A-fib ([facility]), Asthma, Bipolar 2 disorder (HCC), Chickenpox, Chronic obstructive pulmonary disease (HCC), Hypertension, On home oxygen therapy, Other psychological stress, Schizophrenic disorder ([facility]), and Yeast infection of the skin (4/1/2021).    SURGICAL " HISTORY   has a past surgical history that includes hysterectomy laparoscopy; colectomy; cholecystectomy; and knee arthroscopy (Left).    SOCIAL HISTORY  Social History     Tobacco Use   • Smoking status: Former Smoker     Years: 20.00     Types: Cigarettes     Start date: 4/4/2022   • Smokeless tobacco: Never Used   Vaping Use   • Vaping Use: Never used   Substance Use Topics   • Alcohol use: No   • Drug use: No      Social History     Substance and Sexual Activity   Drug Use No       FAMILY HISTORY  Family History   Problem Relation Age of Onset   • No Known Problems Mother    • Cancer Sister        CURRENT MEDICATIONS  Home Medications     Reviewed by Marian Contreras R.N. (Registered Nurse) on 05/02/22 at 2244  Med List Status: Partial   Medication Last Dose Status   acetaminophen (TYLENOL) 325 MG Tab  Active   albuterol 108 (90 Base) MCG/ACT Aero Soln inhalation aerosol  Active   aripiprazole (ABILIFY) 30 MG tablet  Active   atorvastatin (LIPITOR) 20 MG Tab  Active   budesonide-formoterol (SYMBICORT) 160-4.5 MCG/ACT Aerosol  Active   doxycycline (MONODOX) 100 MG capsule  Active   famotidine (PEPCID) 40 MG Tab  Active   fluticasone (FLONASE) 50 MCG/ACT nasal spray  Active   furosemide (LASIX) 40 MG Tab  Active   gabapentin (NEURONTIN) 300 MG Cap 5/2/2022 Active   ketoconazole (NIZORAL) 2 % Cream  Active   lidocaine (LIDODERM) 5 % Patch  Active   loperamide (IMODIUM) 2 MG Cap  Active   montelukast (SINGULAIR) 10 MG Tab  Active   nitroglycerin (NITROSTAT) 0.4 MG SL Tab  Active   nystatin (MYCOSTATIN) 094552 UNIT/GM Ointment  Active   ondansetron (ZOFRAN ODT) 4 MG TABLET DISPERSIBLE 5/2/2022 Active   selenium sulfide 2.5% (SELSUN) 2.5 % Lotion  Active   spironolactone (ALDACTONE) 25 MG Tab  Active   tiotropium (SPIRIVA HANDIHALER) 18 MCG Cap  Active   traZODone (DESYREL) 50 MG Tab  Active   warfarin (COUMADIN) 6 MG Tab  Active                ALLERGIES  Allergies   Allergen Reactions   • Penicillin G Rash and  "Itching     pt reports that she gets a rash all over her body and gets itchy   • Amoxicillin Rash and Itching     Tolerates cephalosporins, pt reports that she gets a rash all over her body and gets itchy       PHYSICAL EXAM  VITAL SIGNS: /54   Pulse 79   Resp 18   Ht 1.575 m (5' 2\")   Wt (!) 133 kg (293 lb)   LMP  (LMP Unknown)   SpO2 96%   BMI 53.59 kg/m²     General: Alert, mild acute distress, disheveled  Skin: Warm, dry, normal for ethnicity.  Erythema and induration involving anterior tibial region regions of both lower extremities, no palpable venous cord.  Head: Normocephalic, atraumatic  Neck: Trachea midline, no tenderness  Eye: PERRL, normal conjunctiva  ENMT: Oral mucosa pink and dry  Cardiovascular: Regular rate and rhythm, No murmur, Normal peripheral perfusion  Respiratory: Lungs diminished bilaterally more so both bases, respirations are non-labored, breath sounds are equal; no rales, no rhonchi.  Gastrointestinal: Soft, nontender, non distended  Musculoskeletal: No swelling, no deformity.  Reproducible tenderness to the right thoracic and upper lumbar regions, more paraspinous, no step-off.  Neurological: Alert and oriented to person, place, time, and situation  Lymphatics: No lymphadenopathy  Psychiatric: Cooperative, mildly anxious, otherwise appropriate mood & affect      DIAGNOSTIC STUDIES/PROCEDURES    LABS  Results for orders placed or performed during the hospital encounter of 05/02/22   CBC w/ Differential   Result Value Ref Range    WBC 10.4 4.8 - 10.8 K/uL    RBC 4.52 4.20 - 5.40 M/uL    Hemoglobin 11.8 (L) 12.0 - 16.0 g/dL    Hematocrit 38.3 37.0 - 47.0 %    MCV 84.7 81.4 - 97.8 fL    MCH 26.1 (L) 27.0 - 33.0 pg    MCHC 30.8 (L) 33.6 - 35.0 g/dL    RDW 51.5 (H) 35.9 - 50.0 fL    Platelet Count 255 164 - 446 K/uL    MPV 10.7 9.0 - 12.9 fL    Neutrophils-Polys 75.00 (H) 44.00 - 72.00 %    Lymphocytes 17.40 (L) 22.00 - 41.00 %    Monocytes 6.40 0.00 - 13.40 %    Eosinophils 0.60 " 0.00 - 6.90 %    Basophils 0.20 0.00 - 1.80 %    Immature Granulocytes 0.40 0.00 - 0.90 %    Nucleated RBC 0.00 /100 WBC    Neutrophils (Absolute) 7.82 (H) 2.00 - 7.15 K/uL    Lymphs (Absolute) 1.81 1.00 - 4.80 K/uL    Monos (Absolute) 0.67 0.00 - 0.85 K/uL    Eos (Absolute) 0.06 0.00 - 0.51 K/uL    Baso (Absolute) 0.02 0.00 - 0.12 K/uL    Immature Granulocytes (abs) 0.04 0.00 - 0.11 K/uL    NRBC (Absolute) 0.00 K/uL   Complete Metabolic Panel (CMP)   Result Value Ref Range    Sodium 142 135 - 145 mmol/L    Potassium 3.8 3.6 - 5.5 mmol/L    Chloride 104 96 - 112 mmol/L    Co2 28 20 - 33 mmol/L    Anion Gap 10.0 7.0 - 16.0    Glucose 115 (H) 65 - 99 mg/dL    Bun 16 8 - 22 mg/dL    Creatinine 0.98 0.50 - 1.40 mg/dL    Calcium 8.7 8.5 - 10.5 mg/dL    AST(SGOT) 12 12 - 45 U/L    ALT(SGPT) 11 2 - 50 U/L    Alkaline Phosphatase 146 (H) 30 - 99 U/L    Total Bilirubin 0.5 0.1 - 1.5 mg/dL    Albumin 3.5 3.2 - 4.9 g/dL    Total Protein 6.3 6.0 - 8.2 g/dL    Globulin 2.8 1.9 - 3.5 g/dL    A-G Ratio 1.3 g/dL   Troponin STAT   Result Value Ref Range    Troponin T 25 (H) 6 - 19 ng/L   Lipase   Result Value Ref Range    Lipase 20 11 - 82 U/L   LACTIC ACID   Result Value Ref Range    Lactic Acid 1.4 0.5 - 2.0 mmol/L   TSH (for screening thyroid dysfunction)   Result Value Ref Range    TSH 1.020 0.380 - 5.330 uIU/mL   APTT   Result Value Ref Range    APTT 53.4 (H) 24.7 - 36.0 sec   PT/INR   Result Value Ref Range    PT 28.0 (H) 12.0 - 14.6 sec    INR 2.72 (H) 0.87 - 1.13   Magnesium   Result Value Ref Range    Magnesium 2.1 1.5 - 2.5 mg/dL   proBrain Natriuretic Peptide, NT   Result Value Ref Range    NT-proBNP 202 (H) 0 - 125 pg/mL   ESTIMATED GFR   Result Value Ref Range    GFR (CKD-EPI) 71 >60 mL/min/1.73 m 2     All labs reviewed by me.    EKG  12 Lead EKG obtained at 0205 and interpreted by me to show:  Rhythm: Normal sinus rhythm   Rate: 80  Axis: Normal  Intervals: Normal  Q Waves: Normal  No diagnostic ST segment  "elevation    Clinical Impression: Normal EKG  Compared to : None immediately available    RADIOLOGY  CT-CTA COMPLETE THORACOABDOMINAL AORTA   Final Result      No thoracic or abdominal aortic aneurysm or dissection.                             The radiologist's interpretation of all radiological studies have been reviewed by me.    COURSE & MEDICAL DECISION MAKING  Pertinent Labs & Imaging studies reviewed. (See chart for details)    11:17 PM - Patient seen and examined at bedside. Patient will be treated with morphine and Zofran. Ordered cardiac work-up and CTA of the chest to evaluate her symptoms. The differential diagnoses include but are not limited to: Aortic dissection, ACS, pleurisy, pneumonitis, pneumonia    0204: Patient reassessed, still very uncomfortable with regard to pain in the chest and back despite initial dose of morphine.  I am concerned for inflammatory process given her discomfort to the chest and her recent pneumonia.  Given her history of asthma and I think steroids is a good idea at this point.  Have also ordered additional analgesia, hydromorphone.  Troponin is mildly elevated more so than previous.  Given her persistent pain I do feel she benefit further evaluation including serial cardiac enzymes.  I did note a stress test in February that was unremarkable, no indication to repeat stress test sinus.  We will page [facility] family for admission.    0233: Spoke with hospitalist who will be admitting the patient rather than [facility], he accepts her to his service.    Patient Vitals for the past 24 hrs:   BP Pulse Resp SpO2 Height Weight   05/03/22 0000 118/54 79 18 96 % -- --   05/02/22 2242 114/75 83 16 100 % -- --   05/02/22 2240 -- -- -- -- 1.575 m (5' 2\") (!) 133 kg (293 lb)         Decision Making:  This is a 46 y.o. year old female who presents with pain to the precordial chest as well as the back without trauma.  No associated dyspnea, she is on warfarin for history of A. fib.  Patient is " hypoxia on room air but does require 3 L chronically.  He is not hypoxic on 3 L and lungs are actually clear.  Recent diagnosis of pneumonia, thankfully imaging of the chest demonstrates no persistent infiltrate.  Given her severe pain from the anterior chest to the back in context of vascular disease a certainly was concern for potential aortic dissection.  Thankfully CTA is unremarkable.  She has persistent chest pain, this certainly could be postinfectious inflammatory process such as pleurisy or perhaps costochondritis or pneumonitis, however given her troponin is out of range and her established history of vascular disease and family history of heart disease and given heart score is 5, ACS must be considered.  She had a recent stress test as such I do not think it needs to be repeated but certainly observation for serial cardiac enzymes indicated.    Patient admitted to hospitalist in improved but guarded condition to medical surgical observation    FINAL IMPRESSION  1. Precordial chest pain    2. Acute right-sided thoracic back pain    3. Persistent asthma with acute exacerbation, unspecified asthma severity          Kisrty HARRIS M.D. (Ade), am scribing for, and in the presence of, Kirsty Perkins MD.    Electronically signed by: Kirsty Perkins M.D. (Scribe), 5/2/2022    IKirsty MD personally performed the services described in this documentation, as scribed by Kirsty Perkins M.D. in my presence, and it is both accurate and complete    The note accurately reflects work and decisions made by me.  Kirsty Perkins M.D.  5/3/2022  2:30 AM

## 2022-05-03 NOTE — RESPIRATORY CARE
COPD EDUCATION by COPD CLINICAL EDUCATOR  5/3/2022 at 3:44 PM by Vijaya Grady, RRT     Patient currently has altered mental status, is difficult to rouse. The attending MD has placed an order for an ABG. Patient not appropriate for education discussion at this time. Will place on the revisit list.

## 2022-05-04 ENCOUNTER — PATIENT OUTREACH (OUTPATIENT)
Dept: HEALTH INFORMATION MANAGEMENT | Facility: OTHER | Age: 47
End: 2022-05-04
Payer: MEDICAID

## 2022-05-04 ENCOUNTER — PHARMACY VISIT (OUTPATIENT)
Dept: PHARMACY | Facility: MEDICAL CENTER | Age: 47
End: 2022-05-04
Payer: COMMERCIAL

## 2022-05-04 VITALS
HEART RATE: 75 BPM | SYSTOLIC BLOOD PRESSURE: 105 MMHG | RESPIRATION RATE: 20 BRPM | DIASTOLIC BLOOD PRESSURE: 52 MMHG | OXYGEN SATURATION: 99 % | BODY MASS INDEX: 52.46 KG/M2 | TEMPERATURE: 97 F | HEIGHT: 62 IN | WEIGHT: 285.06 LBS

## 2022-05-04 PROBLEM — J96.21 ACUTE ON CHRONIC RESPIRATORY FAILURE WITH HYPOXIA AND HYPERCAPNIA (HCC): Status: RESOLVED | Noted: 2019-11-18 | Resolved: 2022-05-04

## 2022-05-04 PROBLEM — J96.22 ACUTE ON CHRONIC RESPIRATORY FAILURE WITH HYPOXIA AND HYPERCAPNIA (HCC): Status: RESOLVED | Noted: 2019-11-18 | Resolved: 2022-05-04

## 2022-05-04 LAB
ANION GAP SERPL CALC-SCNC: 10 MMOL/L (ref 7–16)
BASE EXCESS BLDA CALC-SCNC: 1 MMOL/L (ref -4–3)
BODY TEMPERATURE: 36.2 CENTIGRADE
BUN SERPL-MCNC: 20 MG/DL (ref 8–22)
CALCIUM SERPL-MCNC: 9.3 MG/DL (ref 8.5–10.5)
CHLORIDE SERPL-SCNC: 105 MMOL/L (ref 96–112)
CO2 SERPL-SCNC: 26 MMOL/L (ref 20–33)
CREAT SERPL-MCNC: 0.91 MG/DL (ref 0.5–1.4)
EKG IMPRESSION: NORMAL
ERYTHROCYTE [DISTWIDTH] IN BLOOD BY AUTOMATED COUNT: 52.6 FL (ref 35.9–50)
GFR SERPLBLD CREATININE-BSD FMLA CKD-EPI: 77 ML/MIN/1.73 M 2
GLUCOSE BLD STRIP.AUTO-MCNC: 85 MG/DL (ref 65–99)
GLUCOSE SERPL-MCNC: 100 MG/DL (ref 65–99)
HCO3 BLDA-SCNC: 27 MMOL/L (ref 17–25)
HCT VFR BLD AUTO: 38.4 % (ref 37–47)
HGB BLD-MCNC: 11.3 G/DL (ref 12–16)
INR PPP: 2.41 (ref 0.87–1.13)
MCH RBC QN AUTO: 25.6 PG (ref 27–33)
MCHC RBC AUTO-ENTMCNC: 29.4 G/DL (ref 33.6–35)
MCV RBC AUTO: 87.1 FL (ref 81.4–97.8)
PCO2 BLDA: 48.2 MMHG (ref 26–37)
PH BLDA: 7.37 [PH] (ref 7.4–7.5)
PLATELET # BLD AUTO: 213 K/UL (ref 164–446)
PMV BLD AUTO: 10.5 FL (ref 9–12.9)
PO2 BLDA: 82.8 MMHG (ref 64–87)
POTASSIUM SERPL-SCNC: 4.5 MMOL/L (ref 3.6–5.5)
PROTHROMBIN TIME: 25.5 SEC (ref 12–14.6)
RBC # BLD AUTO: 4.41 M/UL (ref 4.2–5.4)
SAO2 % BLDA: 96.2 % (ref 93–99)
SODIUM SERPL-SCNC: 141 MMOL/L (ref 135–145)
WBC # BLD AUTO: 7.4 K/UL (ref 4.8–10.8)

## 2022-05-04 PROCEDURE — 82962 GLUCOSE BLOOD TEST: CPT

## 2022-05-04 PROCEDURE — 700102 HCHG RX REV CODE 250 W/ 637 OVERRIDE(OP): Performed by: STUDENT IN AN ORGANIZED HEALTH CARE EDUCATION/TRAINING PROGRAM

## 2022-05-04 PROCEDURE — 94640 AIRWAY INHALATION TREATMENT: CPT

## 2022-05-04 PROCEDURE — 96376 TX/PRO/DX INJ SAME DRUG ADON: CPT

## 2022-05-04 PROCEDURE — RXMED WILLOW AMBULATORY MEDICATION CHARGE: Performed by: HOSPITALIST

## 2022-05-04 PROCEDURE — 85610 PROTHROMBIN TIME: CPT

## 2022-05-04 PROCEDURE — 97161 PT EVAL LOW COMPLEX 20 MIN: CPT

## 2022-05-04 PROCEDURE — A9270 NON-COVERED ITEM OR SERVICE: HCPCS | Performed by: STUDENT IN AN ORGANIZED HEALTH CARE EDUCATION/TRAINING PROGRAM

## 2022-05-04 PROCEDURE — 85027 COMPLETE CBC AUTOMATED: CPT

## 2022-05-04 PROCEDURE — 80048 BASIC METABOLIC PNL TOTAL CA: CPT

## 2022-05-04 PROCEDURE — 700101 HCHG RX REV CODE 250: Performed by: STUDENT IN AN ORGANIZED HEALTH CARE EDUCATION/TRAINING PROGRAM

## 2022-05-04 PROCEDURE — 700102 HCHG RX REV CODE 250 W/ 637 OVERRIDE(OP): Performed by: INTERNAL MEDICINE

## 2022-05-04 PROCEDURE — 99217 PR OBSERVATION CARE DISCHARGE: CPT | Performed by: HOSPITALIST

## 2022-05-04 PROCEDURE — 94760 N-INVAS EAR/PLS OXIMETRY 1: CPT

## 2022-05-04 PROCEDURE — 94669 MECHANICAL CHEST WALL OSCILL: CPT

## 2022-05-04 PROCEDURE — 700111 HCHG RX REV CODE 636 W/ 250 OVERRIDE (IP): Performed by: STUDENT IN AN ORGANIZED HEALTH CARE EDUCATION/TRAINING PROGRAM

## 2022-05-04 PROCEDURE — 93010 ELECTROCARDIOGRAM REPORT: CPT | Performed by: INTERNAL MEDICINE

## 2022-05-04 PROCEDURE — G0378 HOSPITAL OBSERVATION PER HR: HCPCS

## 2022-05-04 PROCEDURE — A9270 NON-COVERED ITEM OR SERVICE: HCPCS | Performed by: INTERNAL MEDICINE

## 2022-05-04 RX ORDER — PREDNISONE 20 MG/1
20 TABLET ORAL DAILY
Qty: 4 TABLET | Refills: 0 | Status: SHIPPED | OUTPATIENT
Start: 2022-05-04 | End: 2022-05-08

## 2022-05-04 RX ADMIN — IPRATROPIUM BROMIDE AND ALBUTEROL SULFATE 3 ML: .5; 3 SOLUTION RESPIRATORY (INHALATION) at 02:20

## 2022-05-04 RX ADMIN — IPRATROPIUM BROMIDE AND ALBUTEROL SULFATE 3 ML: .5; 3 SOLUTION RESPIRATORY (INHALATION) at 08:02

## 2022-05-04 RX ADMIN — FUROSEMIDE 40 MG: 10 INJECTION, SOLUTION INTRAMUSCULAR; INTRAVENOUS at 06:12

## 2022-05-04 RX ADMIN — BUDESONIDE AND FORMOTEROL FUMARATE DIHYDRATE 2 PUFF: 160; 4.5 AEROSOL RESPIRATORY (INHALATION) at 08:04

## 2022-05-04 RX ADMIN — ARIPIPRAZOLE 30 MG: 15 TABLET ORAL at 06:13

## 2022-05-04 RX ADMIN — OMEPRAZOLE 20 MG: 20 CAPSULE, DELAYED RELEASE ORAL at 06:12

## 2022-05-04 RX ADMIN — FAMOTIDINE 40 MG: 20 TABLET ORAL at 06:12

## 2022-05-04 RX ADMIN — IPRATROPIUM BROMIDE AND ALBUTEROL SULFATE 3 ML: .5; 3 SOLUTION RESPIRATORY (INHALATION) at 10:32

## 2022-05-04 RX ADMIN — GABAPENTIN 300 MG: 300 CAPSULE ORAL at 09:30

## 2022-05-04 SDOH — ECONOMIC STABILITY: FOOD INSECURITY: WITHIN THE PAST 12 MONTHS, YOU WORRIED THAT YOUR FOOD WOULD RUN OUT BEFORE YOU GOT MONEY TO BUY MORE.: OFTEN TRUE

## 2022-05-04 SDOH — ECONOMIC STABILITY: FOOD INSECURITY: WITHIN THE PAST 12 MONTHS, THE FOOD YOU BOUGHT JUST DIDN'T LAST AND YOU DIDN'T HAVE MONEY TO GET MORE.: OFTEN TRUE

## 2022-05-04 ASSESSMENT — SOCIAL DETERMINANTS OF HEALTH (SDOH): HOW HARD IS IT FOR YOU TO PAY FOR THE VERY BASICS LIKE FOOD, HOUSING, MEDICAL CARE, AND HEATING?: SOMEWHAT HARD

## 2022-05-04 ASSESSMENT — GAIT ASSESSMENTS
ASSISTIVE DEVICE: 4 WHEEL WALKER
DISTANCE (FEET): 200
GAIT LEVEL OF ASSIST: SUPERVISED

## 2022-05-04 ASSESSMENT — COGNITIVE AND FUNCTIONAL STATUS - GENERAL
MOBILITY SCORE: 19
TURNING FROM BACK TO SIDE WHILE IN FLAT BAD: UNABLE
CLIMB 3 TO 5 STEPS WITH RAILING: A LOT
SUGGESTED CMS G CODE MODIFIER MOBILITY: CK

## 2022-05-04 NOTE — ASSESSMENT & PLAN NOTE
In the setting of COPD, KATHIA/OHS and narcotics  IV narcan PRN  Bipap therapy if patient develops worsening symptoms

## 2022-05-04 NOTE — CARE PLAN
The patient is Unstable - High likelihood or risk of patient condition declining or worsening         Progress made toward(s) clinical / shift goals:    Problem: Knowledge Deficit - COPD  Goal: Patient/significant other demonstrates understanding of disease process, utilization of the Action Plan, medications and discharge instruction  Outcome: Not Progressing     Problem: Impaired Gas Exchange  Goal: Patient will demonstrate improved ventilation and adequate oxygenation and participate in treatment regimen within the level of ability/situation.  Outcome: Not Progressing       Patient is not progressing towards the following goals:      Problem: Knowledge Deficit - COPD  Goal: Patient/significant other demonstrates understanding of disease process, utilization of the Action Plan, medications and discharge instruction  Outcome: Not Progressing     Problem: Impaired Gas Exchange  Goal: Patient will demonstrate improved ventilation and adequate oxygenation and participate in treatment regimen within the level of ability/situation.  Outcome: Not Progressing

## 2022-05-04 NOTE — DISCHARGE SUMMARY
Discharge Summary    CHIEF COMPLAINT ON ADMISSION  Chief Complaint   Patient presents with   • Back Pain   • N/V       Reason for Admission  ems     Admission Date  5/2/2022    CODE STATUS  Prior    HPI & HOSPITAL COURSE  As per dr mae garg+p    Lorene Haro is a 46 y.o. obese female with history of A. fib on warfarin, OHS/KATHIA, chronic proximal respiratory failure dependent on 3 L, who presented 5/2/2022 with complaint of chest pain and back pain.  She had multiple hospitalization for similar complaint, recently had negative MPS and echo noting 70% EF.  In ER found to have minimally elevated troponin T which is chronically elevated.  Non specific ST changes on EKG.  Due to complaint of persistent chest and back pain, admission requested by ERP.  Admitted for observation.    The patient was monitored overnight.  There is no evidence of arrhythmia on telemetry.  Patient already had a Persantine thallium stress test and so it was not repeated.    Patient did not have any recurrence of any chest discomfort.    Patient encouraged to follow-up with her PCP as soon as possible to reestablish with her CPAP machine for her sleep apnea.  Patient voiced understanding.  Patient was sent home with p.o. prednisone 20 mg daily for 4 days to help with her breathing.      Therefore, she is discharged in good and stable condition to home with close outpatient follow-up.    The patient recovered much more quickly than anticipated on admission.    Discharge Date  5/4/2022    FOLLOW UP ITEMS POST DISCHARGE  pcp asap    DISCHARGE DIAGNOSES  Principal Problem:    Pain in the chest POA: Yes  Active Problems:    COPD (chronic obstructive pulmonary disease) ([facility]) POA: Yes    KATHIA (obstructive sleep apnea) POA: Yes    Weakness POA: Yes    Class 3 severe obesity in adult ([facility]) POA: Yes    Schizophrenia ([facility]) POA: Yes    A-fib ([facility]) POA: Yes    Lower extremity edema POA: Yes    HLD (hyperlipidemia) POA: Yes    Obesity hypoventilation  syndrome (HCC) POA: Yes  Resolved Problems:    Acute on chronic respiratory failure with hypoxia and hypercapnia (HCC) POA: Yes    Chest pain POA: Yes      FOLLOW UP  Future Appointments   Date Time Provider Department Center   6/1/2022  3:20 PM Jessica Whitfield P.A.-C. [facility] None   7/13/2022 10:40 AM Oz Borrego M.D. [facility] None     Heriberto Sharp M.D.  [address] [place] [id number]  [phone]    Go on 5/9/2022  Please attend a hospital follow up at 3:30pm with your primary care provider. Please check in at 3:15pm.       MEDICATIONS ON DISCHARGE     Medication List      START taking these medications      Instructions   predniSONE 20 MG Tabs  Commonly known as: DELTASONE   Take 1 Tablet by mouth every day for 4 days.  Dose: 20 mg        CHANGE how you take these medications      Instructions   warfarin 6 MG Tabs  What changed:   · how much to take  · how to take this  · when to take this  · additional instructions  Commonly known as: COUMADIN   Take one to one and one-half (1-1.5) tablets daily as directed by [facility]        CONTINUE taking these medications      Instructions   acetaminophen 325 MG Tabs  Commonly known as: Tylenol   Take 325 mg by mouth every 8 hours as needed (Generalized pain). Prescription is for 4 tabs every 8 hours prn, patient states she takes 2 tabls every 4 hours prn  Dose: 325 mg     albuterol 108 (90 Base) MCG/ACT Aers inhalation aerosol   Inhale 2 Puffs every 6 hours as needed for Shortness of Breath.  Dose: 2 Puff     aripiprazole 30 MG tablet  Commonly known as: ABILIFY   Take 30 mg by mouth every morning.  Dose: 30 mg     atorvastatin 20 MG Tabs  Commonly known as: LIPITOR   Take 20 mg by mouth at bedtime.  Dose: 20 mg     budesonide-formoterol 160-4.5 MCG/ACT Aero  Commonly known as: SYMBICORT   Inhale 2 Puffs 2 times a day.  Dose: 2 Puff     doxycycline 100 MG capsule  Commonly known as: MONODOX   Take 1 Capsule by mouth 2 times a day.  Dose: 100 mg      famotidine 40 MG Tabs  Commonly known as: PEPCID   Take 40 mg by mouth every day. Indications: Heartburn  Dose: 40 mg     fluticasone 50 MCG/ACT nasal spray  Commonly known as: FLONASE   ADMOINISTER 1 TO 2 SPRAYS INTO AFFECTED NOSTRIL(S) TWICE DAILY     furosemide 40 MG Tabs  Commonly known as: LASIX   Take 1 Tablet by mouth every day.  Dose: 40 mg     gabapentin 300 MG Caps  Commonly known as: NEURONTIN   Take 1 Capsule by mouth 3 times a day.  Dose: 300 mg     ketoconazole 2 % Crea  Commonly known as: NIZORAL   Apply 1 g topically every day.  Dose: 1 g     lidocaine 5 % Ptch  Commonly known as: LIDODERM   Place 1 Patch on the skin every 24 hours.  Dose: 1 Patch     loperamide 2 MG Caps  Commonly known as: IMODIUM   Take 1 Capsule by mouth 4 times a day as needed for Diarrhea.  Dose: 2 mg     montelukast 10 MG Tabs  Commonly known as: SINGULAIR   TAKE 1 TABLET BY MOUTH EVERY NIGHT AT BEDTIME     nitroglycerin 0.4 MG Subl  Commonly known as: NITROSTAT   Place 1 tablet under the tongue as needed for Chest Pain.  Dose: 0.4 mg     nystatin 895410 UNIT/GM Oint  Commonly known as: MYCOSTATIN   Apply 1 g topically 2 times a day.  Dose: 1 Application     ondansetron 4 MG Tbdp  Commonly known as: Zofran ODT   Take 1 Tablet by mouth every 8 hours as needed for Nausea.  Dose: 4 mg     selenium sulfide 2.5% 2.5 % Lotn  Commonly known as: SELSUN   Apply to scalp once daily     Spiriva HandiHaler 18 MCG Caps  Generic drug: tiotropium   Place 1 Capsule into inhaler and inhale every day.  Dose: 18 mcg     spironolactone 25 MG Tabs  Commonly known as: ALDACTONE   Take 1 Tablet by mouth every day.  Dose: 25 mg     traZODone 50 MG Tabs  Commonly known as: DESYREL   Take 100 mg by mouth at bedtime.  Dose: 100 mg            Allergies  Allergies   Allergen Reactions   • Penicillin G Rash and Itching     pt reports that she gets a rash all over her body and gets itchy   • Amoxicillin Rash and Itching     Tolerates cephalosporins, pt  reports that she gets a rash all over her body and gets itchy       DIET  No orders of the defined types were placed in this encounter.      ACTIVITY  As tolerated.  Weight bearing as tolerated    CONSULTATIONS      PROCEDURES     5/3/2022 12:39 AM     HISTORY/REASON FOR EXAM:  Chest pain or back pain, aortic dissection suspected.     TECHNIQUE/EXAM DESCRIPTION:  CT angiogram without and with contrast, with reconstructions.     Initial precontrast thick helical sections were obtained from the top of the aortic arch through the diaphragmatic domes. Following this, thin-section postcontrast helical images were obtained from the lung apices through the iliac crests following the   bolus administration of 70 mL of nonionic Omnipaque 350 contrast.  CT angiographic technique was utilized.     Parasagittal reconstructed images of the aorta were generated utilizing multiplanar volume reformat technique.     Low dose optimization technique was utilized for this CT exam including automated exposure control and adjustment of the mA and/or kV according to patient size.     COMPARISON: CT abdomen and pelvis 9/27/2021     FINDINGS:     Aorta: Pre-contrast images demonstrate no evidence of displaced calcified intima or intramural hematoma.  Aortic arch: Branching pattern is conventional.  Diameter: The ascending and descending aorta are of normal caliber.  Dissection: Absent.  Celiac artery origin: Patent  Superior mesenteric artery origin: Patent  Renal artery origins: Patent  Inferior mesenteric artery: Patent.  Common iliac arteries: Nonaneurysmal and patent.     Heart: Normal size. No pericardial effusion. Coronary artery origins are conventional.     3D angiographic/MIP images of the vasculature confirm the vascular findings as described above.     Lungs: No opacity or pleural fluid identified.  Liver: Homogeneous enhancement. No masses identified.  Biliary system: No intrahepatic or extrahepatic ductal dilatation. The  gallbladder is grossly unremarkable.  Spleen: Unremarkable.  Adrenal glands: Unremarkable.  Pancreas: Unremarkable.  Kidneys: Symmetric enhancement. No solid mass is identified.  Bowel: Unremarkable. No pneumoperitoneum.  Gallbladder is absent.  Ascites: None.  Lymph nodes: No adenopathy is identified.  Bones: Unremarkable.     IMPRESSION:     No thoracic or abdominal aortic aneurysm or dissection.    LABORATORY  Lab Results   Component Value Date    SODIUM 141 05/04/2022    POTASSIUM 4.5 05/04/2022    CHLORIDE 105 05/04/2022    CO2 26 05/04/2022    GLUCOSE 100 (H) 05/04/2022    BUN 20 05/04/2022    CREATININE 0.91 05/04/2022    CREATININE 0.9 09/11/2008        Lab Results   Component Value Date    WBC 7.4 05/04/2022    HEMOGLOBIN 11.3 (L) 05/04/2022    HEMATOCRIT 38.4 05/04/2022    PLATELETCT 213 05/04/2022        Total time of the discharge process exceeds  36 minutes.

## 2022-05-04 NOTE — PROGRESS NOTES
Assumed care of pt @ 1900.   Assessed pt at beginning of shift. Lethargic, only arousable to touch. On 2L NC. RT at bedside.   Notified JAY of pt status. Instructed to place on home CPAP. RT placed pt on CPAP.   2130: AOx4. Pt removed CPAP mask and began yelling for food. Dinner provided to pt while pt placed on 3L NC with adequate SpO2 reading. Pt placed back on CPAP after eating.   2300: RT and RN at bedside. Pt on CPAP and desatting to 80s. Notified JAY and received ABG order.   2350: ABG results improved from last draw.   0030: Pt continues to remove CPAP mask, stating that it does not fit her well and is uncomfortable. RT notified, but does not have different size for mask. JAY notified as well. Pt refusing to wear mask, so placed on 7L oxymask.

## 2022-05-04 NOTE — PROGRESS NOTES
D/c instructions given, educated on worsening s/s. Pt understands and questions answered. Iv removed.

## 2022-05-04 NOTE — PROGRESS NOTES
4 Eyes Skin Assessment Completed by JC green and  JC marquez.    Head WDL  Ears WDL  Nose WDL  Mouth WDL  Neck WDL  Breast/Chest moisture under bilateral breasts  Shoulder Blades WDL  Spine WDL  (R) Arm/Elbow/Hand WDL  (L) Arm/Elbow/Hand WDL  Abdomen WDL  Groin moisture associated dermities, redness, odor  Scrotum/Coccyx/Buttocks WDL  (R) Leg WDL  (L) Leg WDL  (R) Heel/Foot/Toe WDL  (L) Heel/Foot/Toe WDL          Devices In Places Tele Box, Pulse Ox and Nasal Cannula      Interventions In Place Pillows and Heels Loaded W/Pillows    Possible Skin Injury No    Pictures Uploaded Into Epic N/A  Wound Consult Placed N/A  RN Wound Prevention Protocol Ordered No

## 2022-05-04 NOTE — CARE PLAN
The patient is Watcher - Medium risk of patient condition declining or worsening    Shift Goals  Clinical Goals: mental status, improved resp status  Patient Goals: comfort    Progress made toward(s) clinical / shift goals:    Problem: Knowledge Deficit - Standard  Goal: Patient and family/care givers will demonstrate understanding of plan of care, disease process/condition, diagnostic tests and medications  Outcome: Progressing     Problem: Risk for Infection - COPD  Goal: Patient will remain free from signs and symptoms of infection  Outcome: Progressing     Problem: Nutrition - Advanced  Goal: Patient will display progressive weight gain toward goal have adequate food and fluid intake  Outcome: Progressing     Problem: Ineffective Airway Clearance  Goal: Patient will maintain patent airway with clear/clearing breath sounds  Outcome: Progressing     Problem: Risk for Aspiration  Goal: Patient's risk for aspiration will be absent or decrease  Outcome: Progressing     Problem: Self Care  Goal: Patient will have the ability to perform ADLs independently or with assistance (bathe, groom, dress, toilet and feed)  Outcome: Progressing       Patient is not progressing towards the following goals:      Problem: Pain - Standard  Goal: Alleviation of pain or a reduction in pain to the patient’s comfort goal  Outcome: Not Progressing     Problem: Knowledge Deficit - COPD  Goal: Patient/significant other demonstrates understanding of disease process, utilization of the Action Plan, medications and discharge instruction  Outcome: Not Progressing     Problem: Impaired Gas Exchange  Goal: Patient will demonstrate improved ventilation and adequate oxygenation and participate in treatment regimen within the level of ability/situation.  Outcome: Not Progressing

## 2022-05-04 NOTE — PROGRESS NOTES
ROSA MARIA deleon re-introduced community care management to the patent. Pt has been scheduled for a hospital follow up on 5/9/2022 @ 3:30pm with Heriberto Sharp M.D. at [facility] on [facility]. Pt receives transport from her mother, lives in an apartment, and reports barriers to obtaining food. CHW will deliver food to the [facility] from [facility] after DC from the hospital. CHW will bring information on applying for SNAP and a list of local food her[place]. Pt has no other questions/ concerns at this time.     Community Health Worker Intake  • Social determinates of health intake complete.   • Identified barriers to acquiring food.   • Contact information provided to Lorene Haro   • Has PCP appointment scheduled for 5-9-2022  • Scheduled Food Delivery/Home Visit/Outpatient Visit: after DC  • Accepted Meds-To-Beds.   • Inpatient assessment completed    Plan: CHW will follow up after DC.

## 2022-05-04 NOTE — DISCHARGE PLANNING
ROSA MARIA deleon re-introduced community care management to the patent. Pt has been scheduled for a hospital follow up on 5/9/2022 @ 3:30pm with Heriberto Sharp M.D. at [facility] on [facility]. Pt receives transport from her mother, lives in an apartment, and reports barriers to obtaining food. CHW will deliver food to the [facility] from [facility] after DC from the hospital. CHW will bring information on applying for SNAP and a list of local food [place]. Pt has no other questions/ concerns at this time.     Community Health Worker Intake  • Social determinates of health intake complete.   • Identified barriers to acquiring food.   • Contact information provided to Lorene Haro   • Has PCP appointment scheduled for 5-9-2022  • Scheduled Food Delivery/Home Visit/Outpatient Visit: after DC  • Accepted Meds-To-Beds.   • Inpatient assessment completed    Plan: CHW will follow up after DC.

## 2022-05-04 NOTE — DISCHARGE INSTRUCTIONS
Discharge Instructions    Discharged to home by car with relative. Discharged via wheelchair, hospital escort: Yes.  Special equipment needed: Not Applicable    Be sure to schedule a follow-up appointment with your primary care doctor or any specialists as instructed.     Discharge Plan:   Diet Plan: Discussed  Activity Level: Discussed  Confirmed Follow up Appointment: Patient to Call and Schedule Appointment  Confirmed Symptoms Management: Discussed  Medication Reconciliation Updated: Yes    I understand that a diet low in cholesterol, fat, and sodium is recommended for good health. Unless I have been given specific instructions below for another diet, I accept this instruction as my diet prescription.   Other diet: as tolerated    Special Instructions: None    · Is patient discharged on Warfarin / Coumadin?   No       Nonspecific Chest Pain  Chest pain can be caused by many different conditions. Some causes of chest pain can be life-threatening. These will require treatment right away. Serious causes of chest pain include:  · Heart attack.  · A tear in the body's main blood vessel.  · Redness and swelling (inflammation) around your heart.  · Blood clot in your lungs.  Other causes of chest pain may not be so serious. These include:  · Heartburn.  · Anxiety or stress.  · Damage to bones or muscles in your chest.  · Lung infections.  Chest pain can feel like:  · Pain or discomfort in your chest.  · Crushing, pressure, aching, or squeezing pain.  · Burning or tingling.  · Dull or sharp pain that is worse when you move, cough, or take a deep breath.  · Pain or discomfort that is also felt in your back, neck, jaw, shoulder, or arm, or pain that spreads to any of these areas.  It is hard to know whether your pain is caused by something that is serious or something that is not so serious. So it is important to see your doctor right away if you have chest pain.  Follow these instructions at home:  Medicines  · Take  over-the-counter and prescription medicines only as told by your doctor.  · If you were prescribed an antibiotic medicine, take it as told by your doctor. Do not stop taking the antibiotic even if you start to feel better.  Lifestyle    · Rest as told by your doctor.  · Do not use any products that contain nicotine or tobacco, such as cigarettes, e-cigarettes, and chewing tobacco. If you need help quitting, ask your doctor.  · Do not drink alcohol.  · Make lifestyle changes as told by your doctor. These may include:  ? Getting regular exercise. Ask your doctor what activities are safe for you.  ? Eating a heart-healthy diet. A diet and nutrition specialist (dietitian) can help you to learn healthy eating options.  ? Staying at a healthy weight.  ? Treating diabetes or high blood pressure, if needed.  ? Lowering your stress. Activities such as yoga and relaxation techniques can help.  General instructions  · Pay attention to any changes in your symptoms. Tell your doctor about them or any new symptoms.  · Avoid any activities that cause chest pain.  · Keep all follow-up visits as told by your doctor. This is important. You may need more testing if your chest pain does not go away.  Contact a doctor if:  · Your chest pain does not go away.  · You feel depressed.  · You have a fever.  Get help right away if:  · Your chest pain is worse.  · You have a cough that gets worse, or you cough up blood.  · You have very bad (severe) pain in your belly (abdomen).  · You pass out (faint).  · You have either of these for no clear reason:  ? Sudden chest discomfort.  ? Sudden discomfort in your arms, back, neck, or jaw.  · You have shortness of breath at any time.  · You suddenly start to sweat, or your skin gets clammy.  · You feel sick to your stomach (nauseous).  · You throw up (vomit).  · You suddenly feel lightheaded or dizzy.  · You feel very weak or tired.  · Your heart starts to beat fast, or it feels like it is skipping  beats.  These symptoms may be an emergency. Do not wait to see if the symptoms will go away. Get medical help right away. Call your local emergency services (911 in the [place] Do not drive yourself to the hospital.  Summary  · Chest pain can be caused by many different conditions. The cause may be serious and need treatment right away. If you have chest pain, see your doctor right away.  · Follow your doctor's instructions for taking medicines and making lifestyle changes.  · Keep all follow-up visits as told by your doctor. This includes visits for any further testing if your chest pain does not go away.  · Be sure to know the signs that show that your condition has become worse. Get help right away if you have these symptoms.  This information is not intended to replace advice given to you by your health care provider. Make sure you discuss any questions you have with your health care provider.  Document Released: 06/05/2009 Document Revised: 06/20/2019 Document Reviewed: 06/20/2019  [employer] Patient Education © 2020 [employer]      Depression / Suicide Risk    As you are discharged from this [facility] facility, it is important to learn how to keep safe from harming yourself.    Recognize the warning signs:  · Abrupt changes in personality, positive or negative- including increase in energy   · Giving away possessions  · Change in eating patterns- significant weight changes-  positive or negative  · Change in sleeping patterns- unable to sleep or sleeping all the time   · Unwillingness or inability to communicate  · Depression  · Unusual sadness, discouragement and loneliness  · Talk of wanting to die  · Neglect of personal appearance   · Rebelliousness- reckless behavior  · Withdrawal from people/activities they love  · Confusion- inability to concentrate     If you or a loved one observes any of these behaviors or has concerns about self-harm, here's what you can do:  · Talk about it- your feelings and  reasons for harming yourself  · Remove any means that you might use to hurt yourself (examples: pills, rope, extension cords, firearm)  · Get professional help from the community (Mental Health, Substance Abuse, psychological counseling)  · Do not be alone:Call your Safe Contact- someone whom you trust who will be there for you.  · Call your local CRISIS HOTLINE 774-8414 or 261-696-4937  · Call your local Children's Mobile Crisis Response Team [place] (237) 643-5669 or www.[employer]  · Call the toll free National Suicide Prevention Hotlines   · National Suicide Prevention Lifeline 968-985-HGXJ (7361)  · National Hope Line Network 800-SUICIDE (211-0432)

## 2022-05-04 NOTE — THERAPY
Physical Therapy   Initial Evaluation     Patient Name: Lorene Haro  Age:  46 y.o., Sex:  female  Medical Record #: [id number]  Today's Date: 5/4/2022          Assessment  Patient is 46 y.o. female admitted w/ c/o chest pain and back pain.  Hx of Afib, respiratory failure, 3 liters o2 at home, obesity asthma, bipolar d/o, schizophrenic.  She lives in a third floor apartment w elevator access, where she is mobile w/ a 4ww.  Today, she is able to move in/out of bed w/o assist, stand and ambulate w/ her 4ww w/o loss of balance.  Able to get on/off the toilet w/o assist.  No acute PT needs.  Plan    Recommend Physical Therapy for Evaluation only     DC Equipment Recommendations: None  Discharge Recommendations: Recommend home health for continued physical therapy services         Objective       05/04/22 0921   Prior Living Situation   Housing / Facility 3 Story Apartment / Condo   Steps Into Home 0   Steps In Home 0   Elevator Yes   Equipment Owned 4-Wheel Walker   Lives with - Patient's Self Care Capacity Alone and Able to Care For Self   Comments mother takes her grocery shopping   Prior Level of Functional Mobility   Bed Mobility Independent   Transfer Status Independent   Ambulation Independent   Assistive Devices Used 4-Wheel Walker   Balance Assessment   Sitting Balance (Static) Fair +   Sitting Balance (Dynamic) Fair +   Standing Balance (Static) Fair +   Standing Balance (Dynamic) Fair +   Weight Shift Sitting Good   Weight Shift Standing Good   Comments w/ 4ww   Gait Analysis   Gait Level Of Assist Supervised   Assistive Device 4 Wheel Walker   Distance (Feet) 200   Bed Mobility    Supine to Sit Supervised   Sit to Supine Supervised   Functional Mobility   Sit to Stand Supervised   Bed, Chair, Wheelchair Transfer Supervised   Toilet Transfers Supervised   Anticipated Discharge Equipment and Recommendations   DC Equipment Recommendations None   Discharge Recommendations Recommend home health for continued  physical therapy services

## 2022-05-04 NOTE — CARE PLAN
Problem: Risk for Infection - COPD  Goal: Patient will remain free from signs and symptoms of infection  Outcome: Progressing     Problem: Impaired Gas Exchange  Goal: Patient will demonstrate improved ventilation and adequate oxygenation and participate in treatment regimen within the level of ability/situation.  Outcome: Progressing     Problem: Self Care  Goal: Patient will have the ability to perform ADLs independently or with assistance (bathe, groom, dress, toilet and feed)  Outcome: Progressing     The patient is Stable - Low risk of patient condition declining or worsening    Shift Goals  Clinical Goals: mental status, improved resp status  Patient Goals: comfort    Progress made toward(s) clinical / shift goals:      Patient is not progressing towards the following goals:

## 2022-05-05 ENCOUNTER — ANTICOAGULATION MONITORING (OUTPATIENT)
Dept: VASCULAR LAB | Facility: MEDICAL CENTER | Age: 47
End: 2022-05-05
Payer: MEDICAID

## 2022-05-05 DIAGNOSIS — D68.69 SECONDARY HYPERCOAGULABLE STATE (HCC): ICD-10-CM

## 2022-05-05 NOTE — PROGRESS NOTES
OP Anticoagulation Service Note    Date: 2022    Anticoagulation Summary  As of 2022    INR goal:  2.0-3.0   TTR:  44.3 % (11.7 mo)   INR used for dosin.41 (2022)   Warfarin maintenance plan:  6 mg (6 mg x 1) every day   Weekly warfarin total:  42 mg   Plan last modified:  Kurt Cai, PharmD (2021)   Next INR check:  2022   Target end date:  Indefinite    Indications    A-fib (HCC) [I48.91]  Secondary hypercoagulable state (HCC) [D68.69]             Anticoagulation Episode Summary     INR check location:      Preferred lab:      Send INR reminders to:      Comments:          Anticoagulation Patient Findings        Voice message for patient regarding their anticoagulant.     HPI:   The reason for today's call is to prevent morbidity and mortality from a blood clot and/or stroke and to reduce the risk of bleeding while on a anticoagulant.     PCP:  Heriberto Sharp M.D.  [address] [place] [phone]    Assessment:     • INR  therapeutic.       Current Outpatient Medications:   •  predniSONE, 20 mg, Oral, DAILY  •  ondansetron, 4 mg, Oral, Q8HRS PRN  •  loperamide, 2 mg, Oral, 4X/DAY PRN  •  doxycycline, 100 mg, Oral, BID  •  nystatin, 1 Application, Topical, BID  •  furosemide, 40 mg, Oral, DAILY  •  ketoconazole, 1 g, Topical, DAILY  •  selenium sulfide 2.5%, Apply to scalp once daily  •  spironolactone, 25 mg, Oral, DAILY  •  lidocaine, 1 Patch, Transdermal, Q24HRS  •  gabapentin, 300 mg, Oral, TID  •  fluticasone, ADMOINISTER 1 TO 2 SPRAYS INTO AFFECTED NOSTRIL(S) TWICE DAILY  •  montelukast, TAKE 1 TABLET BY MOUTH EVERY NIGHT AT BEDTIME  •  budesonide-formoterol, 2 Puff, Inhalation, BID  •  Spiriva HandiHaler, 18 mcg, Inhalation, DAILY  •  warfarin, Take one to one and one-half (1-1.5) tablets daily as directed by [facility] (Patient taking differently: 6 mg, Oral, EVERY EVENING)  •  acetaminophen, 325 mg, Oral, Q8HRS PRN  •  atorvastatin, 20 mg,  Oral, QHS  •  traZODone, 100 mg, Oral, QHS  •  famotidine, 40 mg, Oral, DAILY  •  nitroglycerin, 0.4 mg, Sublingual, PRN  •  albuterol, 2 Puff, Inhalation, Q6HRS PRN  •  aripiprazole, 30 mg, Oral, QAM      Plan:     • Continue the same warfarin dose, as noted above.       Follow-up:     • Our protocol suggests we test in 2 weeks.        Additional information discussed with patient:     • Asked patient to please call the anticoagulation clinic if they have any signs/symptoms of bleeding and/or thrombosis or any changes to diet or medications.      National recommendations regarding anticoagulation therapy:     The CHEST guidelines recommends frequent INR monitoring at regular intervals (a few days up to a max of 12 weeks) to ensure patients are on the proper dose of warfarin, and patients are not having any complications from therapy.  INRs can dramatically change over a short time period due to diet, medications, and medical conditions.     [facility]  Phone [phone] fax [phone]    This note was created using voice recognition software (Dragon). The accuracy of the dictation is limited by the abilities of the software. I have reviewed the note prior to signing, however some errors in grammar and context are still possible. If you have any questions related to this note please do not hesitate to contact our office.

## 2022-05-09 ENCOUNTER — PATIENT OUTREACH (OUTPATIENT)
Dept: HEALTH INFORMATION MANAGEMENT | Facility: OTHER | Age: 47
End: 2022-05-09
Payer: MEDICAID

## 2022-05-09 NOTE — PROGRESS NOTES
CHW followed up with the patient after DC. Pt reports that she received her medications, is attending her PCP appointment today, and would like a food delivery on 5/10/22 from the [facility]. Pt has no questions for CHW at this time.     Community Health Worker Intake  • Social determinates of health intake complete.   • Identified barriers to access to food  • Contact information provided to Lorene Bonnie Haro  • Has PCP appointment scheduled for 5/9  • Scheduled Food Delivery/Home Visit/Outpatient Visit: 5/10  • Outpatient assessment completed.  • Did the patient receive medications post discharge: Yes    Plan: CHW will deliver food, provide information to SNAP & local food [place]. CHW will remove the patient from [facility] caseload  after that if there are no further needs.

## 2022-05-13 ENCOUNTER — APPOINTMENT (OUTPATIENT)
Dept: RADIOLOGY | Facility: MEDICAL CENTER | Age: 47
End: 2022-05-13
Attending: EMERGENCY MEDICINE
Payer: MEDICAID

## 2022-05-13 ENCOUNTER — TELEPHONE (OUTPATIENT)
Dept: CARDIOLOGY | Facility: MEDICAL CENTER | Age: 47
End: 2022-05-13
Payer: MEDICAID

## 2022-05-13 ENCOUNTER — HOSPITAL ENCOUNTER (EMERGENCY)
Facility: MEDICAL CENTER | Age: 47
End: 2022-05-13
Attending: EMERGENCY MEDICINE
Payer: MEDICAID

## 2022-05-13 VITALS
HEART RATE: 80 BPM | DIASTOLIC BLOOD PRESSURE: 60 MMHG | TEMPERATURE: 98.5 F | RESPIRATION RATE: 19 BRPM | SYSTOLIC BLOOD PRESSURE: 127 MMHG | WEIGHT: 280 LBS | HEIGHT: 62 IN | OXYGEN SATURATION: 96 % | BODY MASS INDEX: 51.53 KG/M2

## 2022-05-13 DIAGNOSIS — R06.02 SHORTNESS OF BREATH: ICD-10-CM

## 2022-05-13 DIAGNOSIS — Z79.01 SUBTHERAPEUTIC ANTICOAGULATION: ICD-10-CM

## 2022-05-13 DIAGNOSIS — R79.89 ELEVATED TROPONIN: ICD-10-CM

## 2022-05-13 DIAGNOSIS — Z51.81 SUBTHERAPEUTIC ANTICOAGULATION: ICD-10-CM

## 2022-05-13 LAB
ALBUMIN SERPL BCP-MCNC: 3.5 G/DL (ref 3.2–4.9)
ALBUMIN/GLOB SERPL: 1.4 G/DL
ALP SERPL-CCNC: 116 U/L (ref 30–99)
ALT SERPL-CCNC: 12 U/L (ref 2–50)
ANION GAP SERPL CALC-SCNC: 7 MMOL/L (ref 7–16)
ANISOCYTOSIS BLD QL SMEAR: ABNORMAL
AST SERPL-CCNC: 5 U/L (ref 12–45)
BASOPHILS # BLD AUTO: 0 % (ref 0–1.8)
BASOPHILS # BLD: 0 K/UL (ref 0–0.12)
BILIRUB SERPL-MCNC: 0.8 MG/DL (ref 0.1–1.5)
BUN SERPL-MCNC: 13 MG/DL (ref 8–22)
CALCIUM SERPL-MCNC: 8.5 MG/DL (ref 8.5–10.5)
CHLORIDE SERPL-SCNC: 105 MMOL/L (ref 96–112)
CO2 SERPL-SCNC: 29 MMOL/L (ref 20–33)
CREAT SERPL-MCNC: 0.88 MG/DL (ref 0.5–1.4)
EKG IMPRESSION: NORMAL
EOSINOPHIL # BLD AUTO: 0.18 K/UL (ref 0–0.51)
EOSINOPHIL NFR BLD: 1.8 % (ref 0–6.9)
ERYTHROCYTE [DISTWIDTH] IN BLOOD BY AUTOMATED COUNT: 53.1 FL (ref 35.9–50)
GFR SERPLBLD CREATININE-BSD FMLA CKD-EPI: 81 ML/MIN/1.73 M 2
GLOBULIN SER CALC-MCNC: 2.5 G/DL (ref 1.9–3.5)
GLUCOSE SERPL-MCNC: 99 MG/DL (ref 65–99)
HCT VFR BLD AUTO: 36.8 % (ref 37–47)
HGB BLD-MCNC: 11 G/DL (ref 12–16)
INR PPP: 1.3 (ref 0.87–1.13)
LYMPHOCYTES # BLD AUTO: 1.02 K/UL (ref 1–4.8)
LYMPHOCYTES NFR BLD: 10.4 % (ref 22–41)
MANUAL DIFF BLD: NORMAL
MCH RBC QN AUTO: 26.4 PG (ref 27–33)
MCHC RBC AUTO-ENTMCNC: 29.9 G/DL (ref 33.6–35)
MCV RBC AUTO: 88.5 FL (ref 81.4–97.8)
MICROCYTES BLD QL SMEAR: ABNORMAL
MONOCYTES # BLD AUTO: 0.17 K/UL (ref 0–0.85)
MONOCYTES NFR BLD AUTO: 1.7 % (ref 0–13.4)
MORPHOLOGY BLD-IMP: NORMAL
NEUTROPHILS # BLD AUTO: 8.44 K/UL (ref 2–7.15)
NEUTROPHILS NFR BLD: 86.1 % (ref 44–72)
NRBC # BLD AUTO: 0 K/UL
NRBC BLD-RTO: 0 /100 WBC
OVALOCYTES BLD QL SMEAR: NORMAL
PLATELET # BLD AUTO: 230 K/UL (ref 164–446)
PLATELET BLD QL SMEAR: NORMAL
PMV BLD AUTO: 11 FL (ref 9–12.9)
POIKILOCYTOSIS BLD QL SMEAR: NORMAL
POTASSIUM SERPL-SCNC: 3.9 MMOL/L (ref 3.6–5.5)
PROT SERPL-MCNC: 6 G/DL (ref 6–8.2)
PROTHROMBIN TIME: 15.8 SEC (ref 12–14.6)
RBC # BLD AUTO: 4.16 M/UL (ref 4.2–5.4)
RBC BLD AUTO: PRESENT
SODIUM SERPL-SCNC: 141 MMOL/L (ref 135–145)
TROPONIN T SERPL-MCNC: 24 NG/L (ref 6–19)
WBC # BLD AUTO: 9.8 K/UL (ref 4.8–10.8)

## 2022-05-13 PROCEDURE — 99284 EMERGENCY DEPT VISIT MOD MDM: CPT

## 2022-05-13 PROCEDURE — 36415 COLL VENOUS BLD VENIPUNCTURE: CPT

## 2022-05-13 PROCEDURE — 80053 COMPREHEN METABOLIC PANEL: CPT

## 2022-05-13 PROCEDURE — 84484 ASSAY OF TROPONIN QUANT: CPT

## 2022-05-13 PROCEDURE — 85610 PROTHROMBIN TIME: CPT

## 2022-05-13 PROCEDURE — 85025 COMPLETE CBC W/AUTO DIFF WBC: CPT

## 2022-05-13 PROCEDURE — 71045 X-RAY EXAM CHEST 1 VIEW: CPT

## 2022-05-13 PROCEDURE — 93005 ELECTROCARDIOGRAM TRACING: CPT | Performed by: EMERGENCY MEDICINE

## 2022-05-13 PROCEDURE — 85007 BL SMEAR W/DIFF WBC COUNT: CPT

## 2022-05-13 ASSESSMENT — FIBROSIS 4 INDEX: FIB4 SCORE: 0.82

## 2022-05-14 NOTE — ED NOTES
Patient given discharge instructions including the importance of follow up with PCP and medication compliance and patient verbalized understanding appropriately, denies any further questions or concerns at this time. Patient is alert and oriented and ambulates with a walker. Discharged to self.

## 2022-05-14 NOTE — ED TRIAGE NOTES
Chief Complaint   Patient presents with   • Chest Pain     X3 days, central, non-radiating, described as sharp. Hx a-fib, takes warfarin. Has not had INR checked in 3 months.    • Shortness of Breath     Worsening x3 days. Hx COPD     PT BIB [facility] for above. Also reports intermittent nausea & dizziness.    Pt A&Ox4, GCS 15. Given 324 ASA & 4mg zofran PTA.     EKG completed. Chart up for ERP

## 2022-05-14 NOTE — ED PROVIDER NOTES
ED Provider Note    Scribed for Nader Wilson by Farrah Hsieh. 5/13/2022  6:31 PM    Primary care provider: Heriberto Sharp M.D.  Means of arrival: EMS  History obtained from: Patient  History limited by: None    CHIEF COMPLAINT  Chief Complaint   Patient presents with   • Chest Pain     X3 days, central, non-radiating, described as sharp. Hx a-fib, takes warfarin. Has not had INR checked in 3 months.    • Shortness of Breath     Worsening x3 days. Hx COPD       HPI  Lorene Haro is a 46 y.o. female who presents to the Emergency Department via EMS with worsening shortness of breath onset 3 days. The patient has a history of COPD and is normally on 3L of O2 at home. She also endorses a cough, dizziness, heart palpitations, decreased appetite and fluid intake, right-sided abdominal cramping, darker urine, and chest pain that radiates from the center of her chest to her back and left side. The patient's history includes atrial fibrillation and takes warfarin and nitroglycerine, but notes she has not been compliant on her medications. The patient has received all 3 of her Covid-19 vaccines. She denies smoking or abusing drugs or alcohol.     Quality:worsening  Duration: 3 days  Severity: moderate  Associated sx: cough, dizziness, heart palpitations, decreased appetite and fluid intake, right-sided abdominal cramping, darker urine, and radiating chest pain    REVIEW OF SYSTEMS  As above, all other systems reviewed and are negative.   See HPI for further details.     PAST MEDICAL HISTORY   has a past medical history of A-fib (HCC), Asthma, Bipolar 2 disorder (HCC), Chickenpox, Chronic obstructive pulmonary disease (HCC), Hypertension, On home oxygen therapy, Other psychological stress, Schizophrenic disorder (HCC), and Yeast infection of the skin (4/1/2021).  SURGICAL HISTORY   has a past surgical history that includes hysterectomy laparoscopy; colectomy; cholecystectomy; and knee arthroscopy  (Left).  SOCIAL HISTORY  Social History     Tobacco Use   • Smoking status: Former Smoker     Years: 20.00     Types: Cigarettes     Start date: 4/4/2022   • Smokeless tobacco: Never Used   Vaping Use   • Vaping Use: Never used   Substance Use Topics   • Alcohol use: No   • Drug use: No      Social History     Substance and Sexual Activity   Drug Use No     FAMILY HISTORY  Family History   Problem Relation Age of Onset   • No Known Problems Mother    • Cancer Sister      CURRENT MEDICATIONS  Home Medications     Reviewed by Lazara Bruno R.N. (Registered Nurse) on 05/13/22 at 1729  Med List Status: Partial   Medication Last Dose Status   acetaminophen (TYLENOL) 325 MG Tab  Active   albuterol 108 (90 Base) MCG/ACT Aero Soln inhalation aerosol  Active   aripiprazole (ABILIFY) 30 MG tablet  Active   atorvastatin (LIPITOR) 20 MG Tab  Active   budesonide-formoterol (SYMBICORT) 160-4.5 MCG/ACT Aerosol  Active   doxycycline (MONODOX) 100 MG capsule  Active   famotidine (PEPCID) 40 MG Tab  Active   fluticasone (FLONASE) 50 MCG/ACT nasal spray  Active   furosemide (LASIX) 40 MG Tab  Active   gabapentin (NEURONTIN) 300 MG Cap  Active   ketoconazole (NIZORAL) 2 % Cream  Active   lidocaine (LIDODERM) 5 % Patch  Active   loperamide (IMODIUM) 2 MG Cap  Active   montelukast (SINGULAIR) 10 MG Tab  Active   nitroglycerin (NITROSTAT) 0.4 MG SL Tab  Active   nystatin (MYCOSTATIN) 841206 UNIT/GM Ointment  Active   ondansetron (ZOFRAN ODT) 4 MG TABLET DISPERSIBLE  Active   selenium sulfide 2.5% (SELSUN) 2.5 % Lotion  Active   spironolactone (ALDACTONE) 25 MG Tab  Active   tiotropium (SPIRIVA HANDIHALER) 18 MCG Cap  Active   traZODone (DESYREL) 50 MG Tab  Active   warfarin (COUMADIN) 6 MG Tab  Active              ALLERGIES  Allergies   Allergen Reactions   • Penicillin G Rash and Itching     pt reports that she gets a rash all over her body and gets itchy   • Amoxicillin Rash and Itching     Tolerates cephalosporins, pt reports that she  gets a rash all over her body and gets itchy     PHYSICAL EXAM    VITAL SIGNS:   Vitals:    05/13/22 1731 05/13/22 1734 05/13/22 1802 05/13/22 1806   BP:  127/56 123/57    Pulse:  83 87 90   Resp:  20  18   Temp: 36.9 °C (98.5 °F)      TempSrc: Temporal      SpO2:  95% 90% 93%   Weight:       Height:         Vitals: My interpretation: normotensive, not tachycardic, afebrile, not hypoxic    Reinterpretation of vitals: Unchanged     Cardiac Monitor Interpretation: The cardiac monitor revealed normal Sinus Rhythm  as interpreted by me. The cardiac monitor was ordered secondary to the patient's history of sob and to monitor for dysrhythmia and/or tachycardia.    PE:   Constitutional: Well developed, Well nourished, No acute distress, Non-toxic appearance.   HENT: Normocephalic, Atraumatic, Bilateral external ears normal, Oropharynx is clear mucous membranes are moist. No oral exudates or nasal discharge.   Eyes: Pupils are equal round and reactive, EOMI, Conjunctiva normal, No discharge.   Neck: Normal range of motion, No tenderness, Supple, No stridor. No meningismus.  Lymphatic: No lymphadenopathy noted.   Cardiovascular: Regular rate and rhythm with murmur.  Thorax & Lungs: Clear breath sounds bilaterally without wheezes, rhonchi or rales. There is no chest wall tenderness.   Abdomen: Right upper quadrant and left lower quadrant abdominal tenderness, No rebound or guarding. Soft non-distended. No organomegaly is appreciated. Bowel sounds are normal.  Skin: Normal without rash.   Back: No CVA or spinal tenderness.   Extremities: Erythema on bilateral lower extremities, No pitting edema, 2 + pulses, No tenderness, No cyanosis, No clubbing. Capillary refill is less than 2 seconds.  Musculoskeletal: Good range of motion in all major joints. No tenderness to palpation or major deformities noted.   Neurologic: Alert & oriented x 3, Normal motor function, Normal sensory function, No focal deficits noted. Reflexes are  normal.  Psychiatric: Affect normal, Judgment normal, Mood normal. There is no suicidal ideation or patient reported hallucinations.     DIAGNOSTIC STUDIES / PROCEDURES    LABS  Results for orders placed or performed during the hospital encounter of 05/13/22   CBC with Differential   Result Value Ref Range    WBC 9.8 4.8 - 10.8 K/uL    RBC 4.16 (L) 4.20 - 5.40 M/uL    Hemoglobin 11.0 (L) 12.0 - 16.0 g/dL    Hematocrit 36.8 (L) 37.0 - 47.0 %    MCV 88.5 81.4 - 97.8 fL    MCH 26.4 (L) 27.0 - 33.0 pg    MCHC 29.9 (L) 33.6 - 35.0 g/dL    RDW 53.1 (H) 35.9 - 50.0 fL    Platelet Count 230 164 - 446 K/uL    MPV 11.0 9.0 - 12.9 fL    Neutrophils-Polys 86.10 (H) 44.00 - 72.00 %    Lymphocytes 10.40 (L) 22.00 - 41.00 %    Monocytes 1.70 0.00 - 13.40 %    Eosinophils 1.80 0.00 - 6.90 %    Basophils 0.00 0.00 - 1.80 %    Nucleated RBC 0.00 /100 WBC    Neutrophils (Absolute) 8.44 (H) 2.00 - 7.15 K/uL    Lymphs (Absolute) 1.02 1.00 - 4.80 K/uL    Monos (Absolute) 0.17 0.00 - 0.85 K/uL    Eos (Absolute) 0.18 0.00 - 0.51 K/uL    Baso (Absolute) 0.00 0.00 - 0.12 K/uL    NRBC (Absolute) 0.00 K/uL    Anisocytosis 1+     Microcytosis 1+    Complete Metabolic Panel (CMP)   Result Value Ref Range    Sodium 141 135 - 145 mmol/L    Potassium 3.9 3.6 - 5.5 mmol/L    Chloride 105 96 - 112 mmol/L    Co2 29 20 - 33 mmol/L    Anion Gap 7.0 7.0 - 16.0    Glucose 99 65 - 99 mg/dL    Bun 13 8 - 22 mg/dL    Creatinine 0.88 0.50 - 1.40 mg/dL    Calcium 8.5 8.5 - 10.5 mg/dL    AST(SGOT) 5 (L) 12 - 45 U/L    ALT(SGPT) 12 2 - 50 U/L    Alkaline Phosphatase 116 (H) 30 - 99 U/L    Total Bilirubin 0.8 0.1 - 1.5 mg/dL    Albumin 3.5 3.2 - 4.9 g/dL    Total Protein 6.0 6.0 - 8.2 g/dL    Globulin 2.5 1.9 - 3.5 g/dL    A-G Ratio 1.4 g/dL   Troponin   Result Value Ref Range    Troponin T 24 (H) 6 - 19 ng/L   PROTHROMBIN TIME (INR)   Result Value Ref Range    PT 15.8 (H) 12.0 - 14.6 sec    INR 1.30 (H) 0.87 - 1.13   PERIPHERAL SMEAR REVIEW   Result Value Ref  Range    Peripheral Smear Review see below    PLATELET ESTIMATE   Result Value Ref Range    Plt Estimation Normal    MORPHOLOGY   Result Value Ref Range    RBC Morphology Present     Poikilocytosis 1+     Ovalocytes 1+    DIFFERENTIAL MANUAL   Result Value Ref Range    Manual Diff Status PERFORMED    ESTIMATED GFR   Result Value Ref Range    GFR (CKD-EPI) 81 >60 mL/min/1.73 m 2   EKG   Result Value Ref Range    Report       [facility] Emergency Dept.    Test Date:  2022  Pt Name:    ADELINA MILLAN                Department: ER  MRN:        [id number]                      Room:       [facility]  Gender:     Female                       Technician: [id number]  :        1975                   Requested By:ER TRIAGE PROTOCOL  Order #:    [id number]                    Reading MD: Nader Wilson    Measurements  Intervals                                Axis  Rate:       83                           P:  PA:                                      QRS:        55  QRSD:       100                          T:          -2  QT:         364  QTc:        428    Interpretive Statements  SINUS RHYTHM  RSR' IN V1 OR V2, RIGHT VCD OR RVH  BORDERLINE T ABNORMALITIES, INFERIOR LEADS  Compared to ECG 2022 01:16:12  Right ventricular hypertrophy now present  T-wave abnormality now present  Sinus rhythm no longer present  Electronically Signed On 2022 19:00:32 PDT by Nader Wilson        All labs reviewed by me. Significant for no leukocytosis, baseline anemia, normal electrolytes, normal renal function, normal liver enzymes, normal bilirubin, troponin elevated 24 which is baseline, PT/INR subtherapeutic at 1.3    RADIOLOGY  DX-CHEST-PORTABLE (1 VIEW)   Final Result      1.  No acute cardiopulmonary disease.   2.  Stable mild cardiomegaly.        The radiologist's interpretation of all radiological studies have been reviewed by me.    COURSE & MEDICAL DECISION MAKING  Nursing notes, VS, PMSFHx, labs,  imaging, EKG reviewed in chart.    Heart Score: Moderate    MDM: 6:32 PM Lorene Haro is a 46 y.o. female who presented with acute on chronic multiple complaints and comorbidities.  Patient is noncompliant, morbidly obese, oxygen dependent.  She has been seen here over 10 times in the last month including admissions with full work-ups.  Today she presents chiefly with feeling short of breath and some mild chest discomfort.  Her physical exam does show chest wall tenderness to palpation over the left costochondral angle consistent with costochondritis.  EKG shows no ischemia.  Troponin elevated 24 but this is her baseline and consistent with multiple troponins previously.  Chest x-ray is unremarkable.  CBC and CMP are otherwise baseline and unremarkable.  I recommended compliance with her outpatient regiment, doubling up on her Coumadin tomorrow and getting INR checked on Monday.  She verbalized understanding plan for outpatient follow-up and is amenable.  She is at baseline time of discharge in no acute distress, vital signs normal.  Not hypoxic.  Vaccinated and boosted against COVID.    FINAL IMPRESSION  1. Elevated troponin Acute   2. Shortness of breath Acute   3. Subtherapeutic anticoagulation Acute       Farrah HARRIS ([employer]), am scribing for, and in the presence of, Nader Wilson.    Electronically signed by: Farrah Hsieh (Ade), 5/13/2022    INader personally performed the services described in this documentation, as scribed by Farrah Hsieh in my presence, and it is both accurate and complete. C    The note accurately reflects work and decisions made by me.  Nader Wilson  5/13/2022  7:06 PM

## 2022-05-14 NOTE — DISCHARGE INSTRUCTIONS
I want to take a double dose of your Coumadin tomorrow.  And then get your INR checked on Monday.  It is very important that you are compliant with this.  Your labs and chest x-ray are baseline and not see any reason for hospitalization today.  If you have worsening symptoms, follow-up with your PCP or return to the ED for further evaluation and treatment.  Thank you for coming in today.

## 2022-05-14 NOTE — ED NOTES
Handoff report received from Radha GREENE for continuity of care.    Patient resting in [facility]. No apparent signs of distress noted and breathing is non-labored. Equal chest rise and fall. No further needs at this time. Call light within reach. Will continue to monitor pt.

## 2022-05-20 PROCEDURE — 93268 ECG RECORD/REVIEW: CPT | Performed by: INTERNAL MEDICINE

## 2022-05-22 NOTE — RESULT ENCOUNTER NOTE
Unfortunately 5 days of monitoring is not non-diagnostic for afib. She has been hospitalized multiple times and I have reviewed her monitoring strips and not seen any clinically significant atrial fibrillation. Her chadsvasc is at most 2.  I think it is reasonable to stop the warfarin for CVA prophylaxis. If she is concerned about the recurrence of atrial fibrillation or that we are missing it she could have an implantable monitor placed (loop recorder) which would be checked once a month and alert us to any significant AF. Let me know if she has questions, otherwise she can see VR in July.

## 2022-05-23 ENCOUNTER — TELEPHONE (OUTPATIENT)
Dept: CARDIOLOGY | Facility: MEDICAL CENTER | Age: 47
End: 2022-05-23
Payer: MEDICAID

## 2022-05-23 ENCOUNTER — ANTICOAGULATION MONITORING (OUTPATIENT)
Dept: VASCULAR LAB | Facility: MEDICAL CENTER | Age: 47
End: 2022-05-23
Payer: MEDICAID

## 2022-05-23 DIAGNOSIS — D68.69 SECONDARY HYPERCOAGULABLE STATE (HCC): ICD-10-CM

## 2022-05-23 NOTE — TELEPHONE ENCOUNTER
Called patient home number and spoke to the patient, relayed JA results and recommendations. She will stop Warfarin at this time and will discuss ILR with VR in July. Advised I would notify Coumadin Clinic. Answered all questions and concerns, appreciative of call.     Routed to Coumadin Clinic.

## 2022-05-23 NOTE — TELEPHONE ENCOUNTER
----- Message from Angelita Posada P.A.-C. sent at 5/22/2022  3:07 PM PDT -----  Unfortunately 5 days of monitoring is not non-diagnostic for afib. She has been hospitalized multiple times and I have reviewed her monitoring strips and not seen any clinically significant atrial fibrillation. Her chadsvasc is at most 2.  I think it is reasonable to stop the warfarin for CVA prophylaxis. If she is concerned about the recurrence of atrial fibrillation or that we are missing it she could have an implantable monitor placed (loop recorder) which would be checked once a month and alert us to any significant AF. Let me know if she has questions, otherwise she can see VR in July.

## 2022-05-23 NOTE — PROGRESS NOTES
Discharged from [facility] per [facility] Dr. Ascencio discontinuing warfarin  Kinjal Blake, PharmD

## 2022-05-29 ENCOUNTER — APPOINTMENT (OUTPATIENT)
Dept: RADIOLOGY | Facility: MEDICAL CENTER | Age: 47
End: 2022-05-29
Attending: EMERGENCY MEDICINE
Payer: MEDICAID

## 2022-05-29 ENCOUNTER — HOSPITAL ENCOUNTER (EMERGENCY)
Facility: MEDICAL CENTER | Age: 47
End: 2022-05-30
Attending: EMERGENCY MEDICINE
Payer: MEDICAID

## 2022-05-29 DIAGNOSIS — R68.89 FEELING UNWELL: Primary | ICD-10-CM

## 2022-05-29 PROCEDURE — 93005 ELECTROCARDIOGRAM TRACING: CPT | Performed by: EMERGENCY MEDICINE

## 2022-05-29 PROCEDURE — 99284 EMERGENCY DEPT VISIT MOD MDM: CPT

## 2022-05-29 PROCEDURE — 93005 ELECTROCARDIOGRAM TRACING: CPT

## 2022-05-29 PROCEDURE — 36415 COLL VENOUS BLD VENIPUNCTURE: CPT

## 2022-05-29 ASSESSMENT — FIBROSIS 4 INDEX: FIB4 SCORE: 0.3

## 2022-05-30 VITALS
WEIGHT: 279.98 LBS | SYSTOLIC BLOOD PRESSURE: 142 MMHG | TEMPERATURE: 97.2 F | HEIGHT: 62 IN | RESPIRATION RATE: 14 BRPM | HEART RATE: 85 BPM | DIASTOLIC BLOOD PRESSURE: 66 MMHG | OXYGEN SATURATION: 99 % | BODY MASS INDEX: 51.52 KG/M2

## 2022-05-30 LAB
ALBUMIN SERPL BCP-MCNC: 4 G/DL (ref 3.2–4.9)
ALBUMIN/GLOB SERPL: 1.3 G/DL
ALP SERPL-CCNC: 112 U/L (ref 30–99)
ALT SERPL-CCNC: 16 U/L (ref 2–50)
ANION GAP SERPL CALC-SCNC: 12 MMOL/L (ref 7–16)
APPEARANCE UR: CLEAR
AST SERPL-CCNC: 9 U/L (ref 12–45)
BASOPHILS # BLD AUTO: 0.1 % (ref 0–1.8)
BASOPHILS # BLD: 0.02 K/UL (ref 0–0.12)
BILIRUB SERPL-MCNC: 0.7 MG/DL (ref 0.1–1.5)
BILIRUB UR QL STRIP.AUTO: NEGATIVE
BLOOD CULTURE HOLD CXBCH: NORMAL
BUN SERPL-MCNC: 18 MG/DL (ref 8–22)
CALCIUM SERPL-MCNC: 9.6 MG/DL (ref 8.5–10.5)
CHLORIDE SERPL-SCNC: 101 MMOL/L (ref 96–112)
CO2 SERPL-SCNC: 28 MMOL/L (ref 20–33)
COLOR UR: YELLOW
CREAT SERPL-MCNC: 1.06 MG/DL (ref 0.5–1.4)
EKG IMPRESSION: NORMAL
EOSINOPHIL # BLD AUTO: 0.08 K/UL (ref 0–0.51)
EOSINOPHIL NFR BLD: 0.6 % (ref 0–6.9)
ERYTHROCYTE [DISTWIDTH] IN BLOOD BY AUTOMATED COUNT: 53.8 FL (ref 35.9–50)
FLUAV RNA SPEC QL NAA+PROBE: NEGATIVE
FLUBV RNA SPEC QL NAA+PROBE: NEGATIVE
GFR SERPLBLD CREATININE-BSD FMLA CKD-EPI: 64 ML/MIN/1.73 M 2
GLOBULIN SER CALC-MCNC: 3 G/DL (ref 1.9–3.5)
GLUCOSE SERPL-MCNC: 144 MG/DL (ref 65–99)
GLUCOSE UR STRIP.AUTO-MCNC: NEGATIVE MG/DL
HCT VFR BLD AUTO: 43 % (ref 37–47)
HGB BLD-MCNC: 13.1 G/DL (ref 12–16)
IMM GRANULOCYTES # BLD AUTO: 0.07 K/UL (ref 0–0.11)
IMM GRANULOCYTES NFR BLD AUTO: 0.5 % (ref 0–0.9)
KETONES UR STRIP.AUTO-MCNC: NEGATIVE MG/DL
LACTATE BLD-SCNC: 1 MMOL/L (ref 0.5–2)
LACTATE BLD-SCNC: 1.4 MMOL/L (ref 0.5–2)
LEUKOCYTE ESTERASE UR QL STRIP.AUTO: NEGATIVE
LYMPHOCYTES # BLD AUTO: 2.09 K/UL (ref 1–4.8)
LYMPHOCYTES NFR BLD: 15.2 % (ref 22–41)
MCH RBC QN AUTO: 26.8 PG (ref 27–33)
MCHC RBC AUTO-ENTMCNC: 30.5 G/DL (ref 33.6–35)
MCV RBC AUTO: 88.1 FL (ref 81.4–97.8)
MICRO URNS: NORMAL
MONOCYTES # BLD AUTO: 0.86 K/UL (ref 0–0.85)
MONOCYTES NFR BLD AUTO: 6.3 % (ref 0–13.4)
NEUTROPHILS # BLD AUTO: 10.64 K/UL (ref 2–7.15)
NEUTROPHILS NFR BLD: 77.3 % (ref 44–72)
NITRITE UR QL STRIP.AUTO: NEGATIVE
NRBC # BLD AUTO: 0 K/UL
NRBC BLD-RTO: 0 /100 WBC
PH UR STRIP.AUTO: 5 [PH] (ref 5–8)
PLATELET # BLD AUTO: 246 K/UL (ref 164–446)
PMV BLD AUTO: 11.1 FL (ref 9–12.9)
POTASSIUM SERPL-SCNC: 3.7 MMOL/L (ref 3.6–5.5)
PROT SERPL-MCNC: 7 G/DL (ref 6–8.2)
PROT UR QL STRIP: NEGATIVE MG/DL
RBC # BLD AUTO: 4.88 M/UL (ref 4.2–5.4)
RBC UR QL AUTO: NEGATIVE
RSV RNA SPEC QL NAA+PROBE: NEGATIVE
SARS-COV-2 RNA RESP QL NAA+PROBE: NOTDETECTED
SODIUM SERPL-SCNC: 141 MMOL/L (ref 135–145)
SP GR UR STRIP.AUTO: 1.03
SPECIMEN SOURCE: NORMAL
TROPONIN T SERPL-MCNC: 24 NG/L (ref 6–19)
TROPONIN T SERPL-MCNC: 25 NG/L (ref 6–19)
UROBILINOGEN UR STRIP.AUTO-MCNC: 1 MG/DL
WBC # BLD AUTO: 13.8 K/UL (ref 4.8–10.8)

## 2022-05-30 PROCEDURE — 83605 ASSAY OF LACTIC ACID: CPT | Mod: 91

## 2022-05-30 PROCEDURE — 87086 URINE CULTURE/COLONY COUNT: CPT

## 2022-05-30 PROCEDURE — 84484 ASSAY OF TROPONIN QUANT: CPT | Mod: 91

## 2022-05-30 PROCEDURE — 81003 URINALYSIS AUTO W/O SCOPE: CPT

## 2022-05-30 PROCEDURE — 85025 COMPLETE CBC W/AUTO DIFF WBC: CPT

## 2022-05-30 PROCEDURE — 36415 COLL VENOUS BLD VENIPUNCTURE: CPT

## 2022-05-30 PROCEDURE — 71045 X-RAY EXAM CHEST 1 VIEW: CPT

## 2022-05-30 PROCEDURE — 80053 COMPREHEN METABOLIC PANEL: CPT

## 2022-05-30 PROCEDURE — C9803 HOPD COVID-19 SPEC COLLECT: HCPCS | Performed by: EMERGENCY MEDICINE

## 2022-05-30 PROCEDURE — 0241U HCHG SARS-COV-2 COVID-19 NFCT DS RESP RNA 4 TRGT MIC: CPT

## 2022-05-30 NOTE — ED TRIAGE NOTES
"Chief Complaint   Patient presents with   • Flu Like Symptoms   • Weakness   • Shortness of Breath     PT reports calling 911 due to feeling un well.  PT reports fever weakness and increased SOB at home.  OTC meds not helping.      Blood Pressure (Abnormal) 189/77   Pulse (Abnormal) 101   Temperature 35.9 °C (96.6 °F) (Temporal)   Respiration 20   Height 1.575 m (5' 2.01\")   Weight (Abnormal) 127 kg (279 lb 15.8 oz)   Last Menstrual Period  (LMP Unknown)   Oxygen Saturation 96%   Body Mass Index 51.20 kg/m²     "

## 2022-05-30 NOTE — ED NOTES
Pt states she does not feel like she can use the bathroom at this time.  Will continue to encourage.    Pt's blood sent to lab

## 2022-05-30 NOTE — ED NOTES
"Pt discharged home. IV discontinued and gauze placed, pt in possession of belongings. Pt provided discharge education and information pertaining to medications and follow up appointments. Pt received copy of discharge instructions and verbalized understanding. BP (!) 142/66   Pulse 85   Temp 36.2 °C (97.2 °F) (Temporal)   Resp 14   Ht 1.575 m (5' 2.01\")   Wt (!) 127 kg (279 lb 15.8 oz)   LMP  (LMP Unknown)   SpO2 99%   BMI 51.20 kg/m²     "

## 2022-05-30 NOTE — ED PROVIDER NOTES
ED Provider Note     5/29/2022  11:59 PM    Means of Arrival: EMS  History obtained by: patient  Limitations: None  PCP: Heriberto Sharp  CODE STATUS: Full    CHIEF COMPLAINT  Chief Complaint   Patient presents with   • Flu Like Symptoms   • Weakness   • Shortness of Breath     PT reports calling 911 due to feeling un well.  PT reports fever weakness and increased SOB at home.  OTC meds not helping.        HPI  Lorene Haro is a 46 y.o. female history of COPD uses 2 to 3 L home oxygen, obesity, atrial fibrillation no longer on warfarin who presents with concerns of 2-3 days of fatigue, chills, cough.  She was taking trash outside a couple hours ago when she became more short of breath than usual.  She had a sharp pain at her left chest.  Nonradiating pain.  No vomiting.  No diaphoresis.  Pain lasted for several minutes.  Pain is now resolved.  She has not recorded any fevers at home.  No increase in home oxygen use.  She called EMS to bring her to the emergency department.    She says she was told recently that she needs a stent in her heart and this was based off a monitor that was on her chest for 2 weeks.    I have reviewed her past medical records.  I see a note from 1 week ago regarding home monitoring.  No findings of clinically significant atrial fibrillation.  CHADS2 score is at most 2.  She was taken off warfarin.  There is mentioning of placing a loop recorder to further monitor for significant atrial fibrillation.    She was hospitalized at [facility] on May 20, 2022.  I reviewed discharge summary.  Looks as though she was hospitalized for COPD exacerbation.  Hospitalization prior to this was on May 2, 2022 here at [facility].  Complaint was chest pain and back pain.  Apparently multiple hospitalizations with similar concerns.  Echo was done and showed a 70% ejection fraction.    I am unable to find any recent documentation that says she needs a stent placed.    REVIEW OF SYSTEMS  Review of  "Systems   All other systems reviewed and are negative.    See HPI for further details.    PAST MEDICAL HISTORY   has a past medical history of A-fib (HCC), Asthma, Bipolar 2 disorder (HCC), Chickenpox, Chronic obstructive pulmonary disease (HCC), Hypertension, On home oxygen therapy, Other psychological stress, Schizophrenic disorder (HCC), and Yeast infection of the skin (4/1/2021).    FAMILY HISTORY  Family History   Problem Relation Age of Onset   • No Known Problems Mother    • Cancer Sister        SOCIAL HISTORY  Social History     Tobacco Use   • Smoking status: Former Smoker     Years: 20.00     Types: Cigarettes     Start date: 4/4/2022   • Smokeless tobacco: Never Used   Vaping Use   • Vaping Use: Never used   Substance and Sexual Activity   • Alcohol use: No   • Drug use: No   • Sexual activity: Not Currently       SURGICAL HISTORY   has a past surgical history that includes hysterectomy laparoscopy; colectomy; cholecystectomy; and knee arthroscopy (Left).    CURRENT MEDICATIONS  Home Medications    **Home medications have not yet been reviewed for this encounter**         ALLERGIES  Allergies   Allergen Reactions   • Penicillin G Rash and Itching     pt reports that she gets a rash all over her body and gets itchy   • Amoxicillin Rash and Itching     Tolerates cephalosporins, pt reports that she gets a rash all over her body and gets itchy       PHYSICAL EXAM  VITAL SIGNS: /62   Pulse 85   Temp 35.9 °C (96.6 °F) (Temporal)   Resp 14   Ht 1.575 m (5' 2.01\")   Wt (!) 127 kg (279 lb 15.8 oz)   LMP  (LMP Unknown)   SpO2 98%   BMI 51.20 kg/m²     Pulse ox interpretation: I interpret this pulse ox as normal.  Constitutional: Alert in no apparent distress.  46-year-old woman appears older than stated age, increased BMI body habitus wearing nasal cannula oxygen at 3 L.  HENT: No signs of trauma, Bilateral external ears normal, Nose normal.   Eyes: Pupils are equal, Conjunctiva normal, Non-icteric. "   Neck: Normal range of motion, No tenderness, Supple, No stridor.  No JVD  Cardiovascular: Regular rate and rhythm, no murmurs. Symmetric distal pulses. No cyanosis of extremities.  Trace nonpitting edema bilaterally with chronic skin changes of the lower extremities.  Thorax & Lungs: Normal breath sounds, No respiratory distress, No wheezing, No chest tenderness.   Abdomen: Soft, No tenderness, No masses, No pulsatile masses. No peritoneal signs.  Skin: Warm, Dry, No erythema, No rash.   Back: No midline bony tenderness, No CVA tenderness.   Musculoskeletal: Good range of motion in all major joints. No tenderness to palpation or major deformities noted.   Neurologic: Alert , Normal motor function, Normal sensory function, No focal deficits noted.   Psychiatric: Affect normal, Judgment normal, Mood normal.   Physical Exam      DIAGNOSTIC STUDIES / PROCEDURES    EKG  Results for orders placed or performed during the hospital encounter of 22   EKG   Result Value Ref Range    Report       [facility] Emergency Dept.    Test Date:  2022  Pt Name:    ADELINA MILLAN                Department: ER  MRN:        [id number]                      Room:  Gender:     Female                       Technician: [id number]  :        1975                   Requested By:ER TRIAGE PROTOCOL  Order #:    [id number]                    Reading MD: Barry Campbell II, MD    Measurements  Intervals                                Axis  Rate:       90                           P:          83  AK:         123                          QRS:        58  QRSD:       96                           T:          13  QT:         371  QTc:        454    Interpretive Statements  Sinus rhythm  Rate 90  Probable left atrial enlargement  Normal intervals  No ST elevation or depression.  Normal T waves.  Unchanged compared to  previous.  Impression: Normal sinus rhythm EKG likely left atrial enlargement and this  is  unchanged from  prior.    Compared to ECG 05/13/2022 17:28:32  Low QRS volt age now present  Right ventricular hypertrophy no longer present  T-wave abnormality no longer present  Electronically Signed On 5- 0:37:58 PDT by Barry Campbell II, MD         LABS  Pertinent Labs & Imaging studies reviewed. (See chart for details)    RADIOLOGY  Pertinent Labs & Imaging studies reviewed. (See chart for details)    COURSE & MEDICAL DECISION MAKING  Pertinent Labs & Imaging studies reviewed. (See chart for details)    11:59 PM This is an emergent evaluation of a  46 y.o. female with history of COPD, obesity hypertension now presenting with multiple symptoms including fatigue, chills, chest pain.  After review of chart looks like she has had similar sensations in the past.  We will screen for possible infection, MI, metabolic abnormalities.  EKG unchanged from previous.    3:35 AM  White count 13.8.  Chest x-ray and urinalysis did not reveal any source of infection.  Viral studies negative.  No signs of infection on skin.  Unclear source of increased white blood cell count.  She does intermittently take steroids and this could be a reason mildly increased white blood cell count.  Metabolic panel within acceptable limits except for slight increase in glucose to 144 but no signs of DKA.  Troponin 24 and 25.  This is similar to previous troponin levels.  No EKG changes.  Heart score is 3.  I do not think this is a presentation of acute coronary syndrome.  Multiple visits with similar symptoms, has had multiple cardiac work-ups.  At this point I believe she is at her baseline and she can safely be discharged.     The patient will return for worsening symptoms and is stable at the time of discharge. The patient verbalizes understanding. Guidance was provided on appropriate use of medications including driving under the influence, overdose, and side effects.         FINAL IMPRESSION    ICD-10-CM   1. Feeling unwell Active R68.89             This dictation was created using voice recognition software. The accuracy of the dictation is limited to the abilities of the software. I expect there may be some errors of grammar and possibly content. The nursing notes were reviewed and certain aspects of this information were incorporated into this note.    Electronically signed by: Barry Campbell II, M.D., 5/29/2022 11:59 PM

## 2022-06-01 ENCOUNTER — OFFICE VISIT (OUTPATIENT)
Dept: SLEEP MEDICINE | Facility: MEDICAL CENTER | Age: 47
End: 2022-06-01
Payer: MEDICAID

## 2022-06-01 VITALS
HEIGHT: 62 IN | WEIGHT: 275 LBS | RESPIRATION RATE: 16 BRPM | DIASTOLIC BLOOD PRESSURE: 82 MMHG | HEART RATE: 82 BPM | OXYGEN SATURATION: 91 % | SYSTOLIC BLOOD PRESSURE: 126 MMHG | BODY MASS INDEX: 50.61 KG/M2

## 2022-06-01 DIAGNOSIS — J44.9 OSA AND COPD OVERLAP SYNDROME (HCC): ICD-10-CM

## 2022-06-01 DIAGNOSIS — G47.33 OSA AND COPD OVERLAP SYNDROME (HCC): ICD-10-CM

## 2022-06-01 DIAGNOSIS — E66.2 OBESITY HYPOVENTILATION SYNDROME (HCC): ICD-10-CM

## 2022-06-01 DIAGNOSIS — G47.33 OSA (OBSTRUCTIVE SLEEP APNEA): ICD-10-CM

## 2022-06-01 DIAGNOSIS — I27.20 PULMONARY HYPERTENSION (HCC): ICD-10-CM

## 2022-06-01 DIAGNOSIS — J96.11 CHRONIC RESPIRATORY FAILURE WITH HYPOXIA AND HYPERCAPNIA (HCC): ICD-10-CM

## 2022-06-01 DIAGNOSIS — J96.12 CHRONIC RESPIRATORY FAILURE WITH HYPOXIA AND HYPERCAPNIA (HCC): ICD-10-CM

## 2022-06-01 DIAGNOSIS — Z87.891 FORMER SMOKER: ICD-10-CM

## 2022-06-01 LAB
BACTERIA UR CULT: NORMAL
SIGNIFICANT IND 70042: NORMAL
SITE SITE: NORMAL
SOURCE SOURCE: NORMAL

## 2022-06-01 PROCEDURE — 99214 OFFICE O/P EST MOD 30 MIN: CPT | Performed by: PHYSICIAN ASSISTANT

## 2022-06-01 ASSESSMENT — ENCOUNTER SYMPTOMS
SINUS PAIN: 0
WEIGHT LOSS: 0
WHEEZING: 0
HEARTBURN: 0
DIZZINESS: 0
SHORTNESS OF BREATH: 1
SORE THROAT: 0
INSOMNIA: 0
PALPITATIONS: 0
CHILLS: 0
TREMORS: 0
ORTHOPNEA: 1
COUGH: 0
HEADACHES: 0
FEVER: 0
SPUTUM PRODUCTION: 0

## 2022-06-01 ASSESSMENT — FIBROSIS 4 INDEX: FIB4 SCORE: .439024390243902439

## 2022-06-01 NOTE — PROGRESS NOTES
CC follow-up on COPD and sleep apnea.    HPI:  Lorene Haro is a 46 y.o. year old female here today for follow-up on COPD, sleep apnea.  Patient did not receive her AVAPS ordered at previous visit as her sleep study was performed too long ago.  Patient last seen in clinic 3/16/2022.  She identifies as a former smoker with 20-pack-year history.  She was still smoking at last visit 1 to 3 cigarettes/day.    History of frequent ER visits for increased shortness of breath and feeling unwell, chest pain.  She did require admission 5/2/2022 and 5/18/2022.  Does need to resume AVAPS use at IPAP of 25/15, EPAP of 11, tidal volume 450, rate of 14 with 3 L O2 bleed in.  Pertinent past medical history includes atrial fibrillation, bipolar 2 disorder, hypertension, schizophrenic disorder, anxiety, COVID-19 pneumonia in October 2021, chronic respiratory failure with hypoxia.  She demonstrates daytime somnolence in clinic and reports morning headache.    Reviewed in clinic vitals including /82, HR 82, O2 sat of 91% room air at rest requires 3 L with any activity, and has O2 which she reports wearing 24/7 at home.  [employer] is A plus.    Reviewed home medication regimen which includes Symbicort 160, Flonase, Singulair, Spiriva HandiHaler, famotidine, Lasix 40 mg, albuterol and O2 at 3 L.  Patient was given nitroglycerin for episodic chest pain. Patient was taken off blood thinners by cardiology, may be due to concerns that she was unable to manage medication.  She was previously instructed to establish with [facility].    Reviewed most recent imaging including chest x-ray obtained 5/30/2022 demonstrating no acute cardiopulmonary process.    Echocardiogram obtained 2/27/2022 demonstrated mildly dilated left ventricle, mild concentric left ventricular hypertrophy, hyperdynamic left ventricular systolic function.  LVEF estimated at 70%, normal diastolic function.  Normal right ventricular  size and systolic function.  Mitral and aortic valves were not well visualized.  Mild tricuspid regurgitation with estimated RVSP of 35.    Chest CT obtained 7/23/2021 demonstrated no evidence of pulmonary embolism.  New areas of dependent airspace disease consistent with atelectasis, no evidence of pulmonary edema or pleural effusion, unchanged nonspecific few prominent mediastinal lymph nodes.  Heart is mildly enlarged.    Pulmonary function testing obtained 9/21/2021 demonstrated an FEV1 of 1.24 L or 48% predicted, FVC of 1.45 L of 46% predicted, FEV1/FVC ratio of 86 suggesting restriction.  Significant bronchodilator response with improvement of 12% in FEV1 postbronchodilator.  Total lung capacity is just below the lower limits of normal at 78% predicted, diffusion capacity within normal limits at 85% predicted.  Per pulmonologist interpretation mixed restrictive obstructive defect with significant bronchodilator response, could be consistent with COPD and patient with morbid obesity.    Polysomnography obtained for titration 2/15/2019 demonstrated severe OSAH with ineffective AVAPS titration.  Therapy was initiated on max IPAP of 25, EPAP of 11, rate of 14 tidal volume 450 with 3 L O2 bleed in.      Review of Systems   Constitutional: Negative for chills, fever, malaise/fatigue and weight loss.   HENT: Negative for congestion, hearing loss, nosebleeds, sinus pain, sore throat and tinnitus.    Respiratory: Positive for shortness of breath (with activity ). Negative for cough, sputum production and wheezing.    Cardiovascular: Positive for orthopnea. Negative for chest pain, palpitations and leg swelling.   Gastrointestinal: Negative for heartburn.        Missing some teeth, some difficulty swallow   Neurological: Negative for dizziness, tremors and headaches.   Psychiatric/Behavioral: The patient does not have insomnia.        Past Medical History:   Diagnosis Date   • A-fib (HCC)    • Asthma    • Bipolar 2  disorder (HCC)    • Chickenpox    • Chronic obstructive pulmonary disease (HCC)    • Hypertension    • On home oxygen therapy    • Other psychological stress    • Schizophrenic disorder (HCC)    • Yeast infection of the skin 4/1/2021       Past Surgical History:   Procedure Laterality Date   • CHOLECYSTECTOMY     • COLECTOMY     • HYSTERECTOMY LAPAROSCOPY     • KNEE ARTHROSCOPY Left        Family History   Problem Relation Age of Onset   • No Known Problems Mother    • Cancer Sister        Social History     Socioeconomic History   • Marital status: Single     Spouse name: Not on file   • Number of children: Not on file   • Years of education: Not on file   • Highest education level: Not on file   Occupational History   • Not on file   Tobacco Use   • Smoking status: Former Smoker     Years: 20.00     Types: Cigarettes     Start date: 4/4/2022   • Smokeless tobacco: Never Used   Vaping Use   • Vaping Use: Never used   Substance and Sexual Activity   • Alcohol use: No   • Drug use: No   • Sexual activity: Not Currently   Other Topics Concern   • Not on file   Social History Narrative    From [place]     Social Determinants of Health     Financial Resource Strain: Medium Risk   • Difficulty of Paying Living Expenses: Somewhat hard   Food Insecurity: Food Insecurity Present   • Worried About Running Out of Food in the Last Year: Often true   • Ran Out of Food in the Last Year: Often true   Transportation Needs: No Transportation Needs   • Lack of Transportation (Medical): No   • Lack of Transportation (Non-Medical): No   Physical Activity: Not on file   Stress: Not on file   Social Connections: Not on file   Intimate Partner Violence: Not on file   Housing Stability: Not on file       Allergies as of 06/01/2022 - Reviewed 06/01/2022   Allergen Reaction Noted   • Penicillin g Rash and Itching 10/20/2020   • Amoxicillin Rash and Itching 02/26/2022        @Vital signs for this encounter:  /82   Pulse 82   Resp 16   " Ht 1.575 m (5' 2\")   Wt 125 kg (275 lb)   SpO2 91%     Current medications as of today   Current Outpatient Medications   Medication Sig Dispense Refill   • ondansetron (ZOFRAN ODT) 4 MG TABLET DISPERSIBLE Take 1 Tablet by mouth every 8 hours as needed for Nausea. 10 Tablet 0   • loperamide (IMODIUM) 2 MG Cap Take 1 Capsule by mouth 4 times a day as needed for Diarrhea. 30 Capsule 0   • doxycycline (MONODOX) 100 MG capsule Take 1 Capsule by mouth 2 times a day. 20 Capsule 0   • nystatin (MYCOSTATIN) 795475 UNIT/GM Ointment Apply 1 g topically 2 times a day. 30 g 0   • furosemide (LASIX) 40 MG Tab Take 1 Tablet by mouth every day. 90 Tablet 3   • ketoconazole (NIZORAL) 2 % Cream Apply 1 g topically every day. 60 g 0   • selenium sulfide 2.5% (SELSUN) 2.5 % Lotion Apply to scalp once daily 118 mL 3   • spironolactone (ALDACTONE) 25 MG Tab Take 1 Tablet by mouth every day. 30 Tablet 1   • lidocaine (LIDODERM) 5 % Patch Place 1 Patch on the skin every 24 hours. 10 Patch 0   • gabapentin (NEURONTIN) 300 MG Cap Take 1 Capsule by mouth 3 times a day. 60 Capsule 0   • fluticasone (FLONASE) 50 MCG/ACT nasal spray ADMOINISTER 1 TO 2 SPRAYS INTO AFFECTED NOSTRIL(S) TWICE DAILY 16 g 3   • montelukast (SINGULAIR) 10 MG Tab TAKE 1 TABLET BY MOUTH EVERY NIGHT AT BEDTIME 90 Tablet 3   • budesonide-formoterol (SYMBICORT) 160-4.5 MCG/ACT Aerosol Inhale 2 Puffs 2 times a day. 3 Each 3   • tiotropium (SPIRIVA HANDIHALER) 18 MCG Cap Place 1 Capsule into inhaler and inhale every day. 30 Capsule 3   • acetaminophen (TYLENOL) 325 MG Tab Take 325 mg by mouth every 8 hours as needed (Generalized pain). Prescription is for 4 tabs every 8 hours prn, patient states she takes 2 tabls every 4 hours prn     • atorvastatin (LIPITOR) 20 MG Tab Take 20 mg by mouth at bedtime.     • traZODone (DESYREL) 50 MG Tab Take 100 mg by mouth at bedtime.     • famotidine (PEPCID) 40 MG Tab Take 40 mg by mouth every day. Indications: Heartburn     • " nitroglycerin (NITROSTAT) 0.4 MG SL Tab Place 1 tablet under the tongue as needed for Chest Pain. 25 tablet 0   • albuterol 108 (90 Base) MCG/ACT Aero Soln inhalation aerosol Inhale 2 Puffs every 6 hours as needed for Shortness of Breath. 8.5 g 0   • aripiprazole (ABILIFY) 30 MG tablet Take 30 mg by mouth every morning.     • warfarin (COUMADIN) 6 MG Tab Take one to one and one-half (1-1.5) tablets daily as directed by [facility] (Patient not taking: Reported on 6/1/2022) 135 Tablet 1     No current facility-administered medications for this visit.       Physical Exam:   Gen:           Somnolent, rouses easily, no apparent distress. Mood and affect quiet, appropriate interaction with provider.  Eyes:          sclere white, conjunctive moist.  Hearing:     Grossly intact.  Dentition:    Poor dentition.  Oropharynx:   Tongue normal, posterior pharynx without erythema or exudate.  Neck:        Supple, trachea midline, no masses.  Respiratory Effort: No intercostal retractions or use of accessory muscles.   Lung Auscultation:      Diminished throughout; no rales, rhonchi or wheezing.  CV:            Regular rate and rhythm.  Mild lower extremity edema. No murmurs, rubs or gallops.  Digits, Nails, Ext: No clubbing, cyanosis, petechiae, or nodes.   Skin:        No rashes, lesions or ulcers noted on exposed skin surfaces.                     Assessment:  1. KATHIA (obstructive sleep apnea)  Polysomnography Titration    Referral to Pulmonary and Sleep Medicine   2. Pulmonary hypertension (HCC)     3. KATHIA and COPD overlap syndrome (HCC)     4. Obesity hypoventilation syndrome (HCC)     5. Former smoker     6. Chronic respiratory failure with hypoxia and hypercapnia (HCC)         Immunizations:    Flu: 12/8/2021  Pneumovax 23: 11/20/2018  Prevnar 13: Deferred  Pfizer SARS-CoV-2 vaccine: 12/15/2021, 5/24/2021, 4/22/2021    Plan:  46-year-old female with several concerns including frequent ER visits for chest  pain, shortness of breath or both.      Former smoker: Reports she recently quit, continued abstinence from tobacco advised including smoking or vaping any product.    COPD/severe persistent asthma: She reports she did receive her nebulizer.  Benefit with use.     KATHIA: Patient reports loss of equipment when moved to a new location.  She does live independently with some community service visits.  Order placed at last visit for replacement however patient sleep study was greater than a year old and will need to repeat in order to receive equipment from [facility] which is A +.  Reports morning headaches, somnolent in clinic although she aroused easily.  Order placed for polysomnogram titration was previously on AVAPS.  She previously reported the mask as being too tight.  May benefit from alternative mask.  Patient also referred to sleep medicine.    Obesity hypoventilation syndrome/chronic respiratory failure with hypoxia: BMI greater than 50, requires 3 L O2 with any activity.  Follow-up with PCP for dietary guidelines as central adiposity is significantly impairing her breathing.    Pulmonary hypertension: She has been evaluated by cardiology.  She is now off warfarin.  History of atrial fibrillation.    Chronic respiratory failure with hypoxia: Patient continues to use and benefit from use of O2 at 3 L.    Patient continues to require close follow-up        This dictation was created using voice recognition software. The accuracy of the dictation is limited to the abilities of the software. I expect there may be some errors of grammar and possibly content.

## 2022-06-01 NOTE — PATIENT INSTRUCTIONS
1-did not receive equipment after last visit  2-follow with sleep and avaps titration  3-will send new order for nebulizer   4-reviewed medications  5-daytime sleepiness noted   6-follow up in 3 months

## 2022-06-05 ENCOUNTER — HOSPITAL ENCOUNTER (EMERGENCY)
Facility: MEDICAL CENTER | Age: 47
End: 2022-06-05
Attending: EMERGENCY MEDICINE
Payer: MEDICAID

## 2022-06-05 VITALS
DIASTOLIC BLOOD PRESSURE: 80 MMHG | BODY MASS INDEX: 50.24 KG/M2 | RESPIRATION RATE: 16 BRPM | WEIGHT: 273 LBS | OXYGEN SATURATION: 94 % | SYSTOLIC BLOOD PRESSURE: 142 MMHG | HEART RATE: 80 BPM | TEMPERATURE: 98 F | HEIGHT: 62 IN

## 2022-06-05 DIAGNOSIS — U07.1 COVID-19: ICD-10-CM

## 2022-06-05 LAB
FLUAV RNA SPEC QL NAA+PROBE: NEGATIVE
FLUBV RNA SPEC QL NAA+PROBE: NEGATIVE
RSV RNA SPEC QL NAA+PROBE: NEGATIVE
S PYO DNA SPEC NAA+PROBE: NOT DETECTED
SARS-COV-2 RNA RESP QL NAA+PROBE: DETECTED
SPECIMEN SOURCE: ABNORMAL

## 2022-06-05 PROCEDURE — 87651 STREP A DNA AMP PROBE: CPT

## 2022-06-05 PROCEDURE — 99284 EMERGENCY DEPT VISIT MOD MDM: CPT

## 2022-06-05 PROCEDURE — 0241U HCHG SARS-COV-2 COVID-19 NFCT DS RESP RNA 4 TRGT MIC: CPT

## 2022-06-05 PROCEDURE — 700111 HCHG RX REV CODE 636 W/ 250 OVERRIDE (IP): Performed by: EMERGENCY MEDICINE

## 2022-06-05 PROCEDURE — M0222 HCHG BEBTELOVIMAB ADMINISTRATION: HCPCS

## 2022-06-05 PROCEDURE — C9803 HOPD COVID-19 SPEC COLLECT: HCPCS | Performed by: EMERGENCY MEDICINE

## 2022-06-05 RX ORDER — BEBTELOVIMAB 87.5 MG/ML
175 INJECTION, SOLUTION INTRAVENOUS ONCE
Status: COMPLETED | OUTPATIENT
Start: 2022-06-05 | End: 2022-06-05

## 2022-06-05 RX ADMIN — BEBTELOVIMAB 175 MG: 87.5 INJECTION, SOLUTION INTRAVENOUS at 09:17

## 2022-06-05 ASSESSMENT — FIBROSIS 4 INDEX: FIB4 SCORE: .439024390243902439

## 2022-06-05 NOTE — ED NOTES
Pt reports she has been feeling weak and has an irritated throat. Pt awake and alert. No acute signs of distress

## 2022-06-05 NOTE — ED TRIAGE NOTES
"Chief Complaint   Patient presents with   • Sore Throat     Pt reports sore throat and \"feels like it's being scratched\" x 1 day       Pt BIB [facility] to triage via personal w/c for above complaint.     Pt presents in triage feeling fatigued upon leaving [facility]. Pt stated she was unable to stand to walk home with walker. Pt reports sore throat and cough.    Pt back to lobby, educated on triage process and encourage to alert staff of any changes.     BP (!) 165/67   Pulse 70   Temp 36 °C (96.8 °F) (Temporal)   Resp 16   Ht 1.575 m (5' 2\")   Wt 124 kg (273 lb)   LMP  (LMP Unknown)   SpO2 97%   BMI 49.93 kg/m²     "

## 2022-06-05 NOTE — ED NOTES
Pt was O2 sat was 81%, pt placed on 3LPM via nasal cannula and now O2 sats up to 97%. Pt states she is normally on 3LPM when she goes to sleep.

## 2022-06-05 NOTE — ED PROVIDER NOTES
"ED Provider Note    CHIEF COMPLAINT  Chief Complaint   Patient presents with   • Sore Throat     Pt reports sore throat and \"feels like it's being scratched\" x 1 day       HPI  Lorene Haro is a 46 y.o. female who presents to the emergency department with a sore throat.  Started yesterday.  Feels like scratchy sensation.  Has not had runny nose, cough, congestion, but it is noted she was here last week with these symptoms.  Patient denies any shortness of breath.  No abdominal pain, nausea vomiting diarrhea.    Chart reviewed.  Patient here on the 29th with flulike illness.  Admitted prior to that late May with COPD exacerbation.    REVIEW OF SYSTEMS  As per HPI, otherwise a 10 point review of systems is negative    PAST MEDICAL HISTORY  Past Medical History:   Diagnosis Date   • A-fib (HCC)    • Asthma    • Bipolar 2 disorder (HCC)    • Chickenpox    • Chronic obstructive pulmonary disease (HCC)    • Hypertension    • On home oxygen therapy    • Other psychological stress    • Schizophrenic disorder ([facility])    • Yeast infection of the skin 4/1/2021       SOCIAL HISTORY  Social History     Tobacco Use   • Smoking status: Former Smoker     Years: 20.00     Types: Cigarettes     Start date: 4/4/2022   • Smokeless tobacco: Never Used   Vaping Use   • Vaping Use: Never used   Substance Use Topics   • Alcohol use: No   • Drug use: No       SURGICAL HISTORY  Past Surgical History:   Procedure Laterality Date   • CHOLECYSTECTOMY     • COLECTOMY     • HYSTERECTOMY LAPAROSCOPY     • KNEE ARTHROSCOPY Left        CURRENT MEDICATIONS  Home Medications     Reviewed by Sarah Mcallister R.N. (Registered Nurse) on 06/05/22 at 0409  Med List Status: Not Addressed   Medication Last Dose Status   acetaminophen (TYLENOL) 325 MG Tab  Active   albuterol 108 (90 Base) MCG/ACT Aero Soln inhalation aerosol  Active   aripiprazole (ABILIFY) 30 MG tablet  Active   atorvastatin (LIPITOR) 20 MG Tab  Active   budesonide-formoterol (SYMBICORT) " "160-4.5 MCG/ACT Aerosol  Active   doxycycline (MONODOX) 100 MG capsule  Active   famotidine (PEPCID) 40 MG Tab  Active   fluticasone (FLONASE) 50 MCG/ACT nasal spray  Active   furosemide (LASIX) 40 MG Tab  Active   gabapentin (NEURONTIN) 300 MG Cap  Active   ketoconazole (NIZORAL) 2 % Cream  Active   lidocaine (LIDODERM) 5 % Patch  Active   loperamide (IMODIUM) 2 MG Cap  Active   montelukast (SINGULAIR) 10 MG Tab  Active   nitroglycerin (NITROSTAT) 0.4 MG SL Tab  Active   nystatin (MYCOSTATIN) 765286 UNIT/GM Ointment  Active   ondansetron (ZOFRAN ODT) 4 MG TABLET DISPERSIBLE  Active   selenium sulfide 2.5% (SELSUN) 2.5 % Lotion  Active   spironolactone (ALDACTONE) 25 MG Tab  Active   tiotropium (SPIRIVA HANDIHALER) 18 MCG Cap  Active   traZODone (DESYREL) 50 MG Tab  Active   warfarin (COUMADIN) 6 MG Tab  Active                ALLERGIES  Allergies   Allergen Reactions   • Penicillin G Rash and Itching     pt reports that she gets a rash all over her body and gets itchy   • Amoxicillin Rash and Itching     Tolerates cephalosporins, pt reports that she gets a rash all over her body and gets itchy       PHYSICAL EXAM  VITAL SIGNS: /64   Pulse 73   Temp 36 °C (96.8 °F) (Temporal)   Resp 16   Ht 1.575 m (5' 2\")   Wt 124 kg (273 lb)   LMP  (LMP Unknown)   SpO2 97%   BMI 49.93 kg/m²    Constitutional: Awake and alert.  Medically obese  HENT: Normal inspection  Eyes: Normal inspection  Neck: Grossly normal range of motion.  Cardiovascular: Normal heart rate, Normal rhythm.  Symmetric peripheral pulses.   Thorax & Lungs: No respiratory distress, No wheezing, No rales, No rhonchi, No chest tenderness.   Abdomen: Bowel sounds normal, soft, non-distended, nontender, no mass  Skin: No obvious rash.  Back: No tenderness, No CVA tenderness.   Extremities: No clubbing, cyanosis, edema, no Homans or cords.  Neurologic: Grossly normal   Psychiatric: Normal for situation        Labs:  Results for orders placed or " performed during the hospital encounter of 06/05/22   Group A Strep by PCR    Specimen: Throat   Result Value Ref Range    Group A Strep by PCR Not Detected Not Detected   CoV-2, FLU A/B, and RSV by PCR (2-4 Hours CEPHEID) : Collect NP swab in VTM    Specimen: Respirate   Result Value Ref Range    Influenza virus A RNA Negative Negative    Influenza virus B, PCR Negative Negative    RSV, PCR Negative Negative    SARS-CoV-2 by PCR DETECTED (AA)     SARS-CoV-2 Source NP Swab          COURSE & MEDICAL DECISION MAKING  Patient presents with a sore throat.  Exam was benign. Vital signs acceptable. Obtain viral swab and strep screen.    Strep screen was negative. COVID screen ultimately resulted and it is positive. The patient required oxygen while sleeping only. This is normal for her. She does not require oxygen while awake. Patient is interested in monoclonal antibodies or other treatment for COVID. I spoke with pharmacy. We will give patient monoclonal antibodies. She will be appropriate for discharge following. She is given standard instructions for COVID. She will return to ER for difficulty breathing, high uncontrolled fever or concern.    FINAL IMPRESSION  1. COVID-19      This dictation was created using voice recognition software. The accuracy of the dictation is limited to the abilities of the software.  The nursing notes were reviewed and certain aspects of this information were incorporated into this note.      Electronically signed by: David Alejandra M.D., 6/5/2022 7:22 AM

## 2022-06-10 ENCOUNTER — HOSPITAL ENCOUNTER (EMERGENCY)
Facility: MEDICAL CENTER | Age: 47
End: 2022-06-10
Attending: EMERGENCY MEDICINE
Payer: MEDICAID

## 2022-06-10 VITALS
SYSTOLIC BLOOD PRESSURE: 113 MMHG | DIASTOLIC BLOOD PRESSURE: 58 MMHG | TEMPERATURE: 98.4 F | RESPIRATION RATE: 20 BRPM | WEIGHT: 281.31 LBS | OXYGEN SATURATION: 98 % | BODY MASS INDEX: 51.77 KG/M2 | HEIGHT: 62 IN | HEART RATE: 68 BPM

## 2022-06-10 DIAGNOSIS — R19.7 DIARRHEA, UNSPECIFIED TYPE: ICD-10-CM

## 2022-06-10 DIAGNOSIS — R11.0 NAUSEA: ICD-10-CM

## 2022-06-10 DIAGNOSIS — U07.1 COVID: ICD-10-CM

## 2022-06-10 PROCEDURE — 700111 HCHG RX REV CODE 636 W/ 250 OVERRIDE (IP): Performed by: EMERGENCY MEDICINE

## 2022-06-10 PROCEDURE — A9270 NON-COVERED ITEM OR SERVICE: HCPCS | Performed by: EMERGENCY MEDICINE

## 2022-06-10 PROCEDURE — 99284 EMERGENCY DEPT VISIT MOD MDM: CPT

## 2022-06-10 PROCEDURE — 700102 HCHG RX REV CODE 250 W/ 637 OVERRIDE(OP): Performed by: EMERGENCY MEDICINE

## 2022-06-10 RX ORDER — ONDANSETRON 4 MG/1
4 TABLET, ORALLY DISINTEGRATING ORAL EVERY 8 HOURS PRN
Qty: 10 TABLET | Refills: 0 | Status: SHIPPED | OUTPATIENT
Start: 2022-06-10 | End: 2022-08-26

## 2022-06-10 RX ORDER — LOPERAMIDE HYDROCHLORIDE 2 MG/1
4 CAPSULE ORAL ONCE
Status: COMPLETED | OUTPATIENT
Start: 2022-06-10 | End: 2022-06-10

## 2022-06-10 RX ORDER — PSEUDOEPHEDRINE HCL 30 MG
30 TABLET ORAL EVERY 4 HOURS PRN
Qty: 30 TABLET | Refills: 0 | Status: SHIPPED | OUTPATIENT
Start: 2022-06-10 | End: 2022-08-26

## 2022-06-10 RX ORDER — ONDANSETRON 4 MG/1
4 TABLET, ORALLY DISINTEGRATING ORAL ONCE
Status: COMPLETED | OUTPATIENT
Start: 2022-06-10 | End: 2022-06-10

## 2022-06-10 RX ORDER — LOPERAMIDE HYDROCHLORIDE 2 MG/1
2 CAPSULE ORAL 4 TIMES DAILY PRN
Qty: 30 CAPSULE | Refills: 0 | Status: SHIPPED | OUTPATIENT
Start: 2022-06-10 | End: 2022-08-26

## 2022-06-10 RX ORDER — BENZONATATE 100 MG/1
200 CAPSULE ORAL ONCE
Status: COMPLETED | OUTPATIENT
Start: 2022-06-10 | End: 2022-06-10

## 2022-06-10 RX ORDER — BENZONATATE 100 MG/1
100 CAPSULE ORAL 3 TIMES DAILY PRN
Qty: 60 CAPSULE | Refills: 0 | Status: SHIPPED | OUTPATIENT
Start: 2022-06-10 | End: 2022-08-26

## 2022-06-10 RX ORDER — PSEUDOEPHEDRINE HYDROCHLORIDE 60 MG/1
60 TABLET, FILM COATED ORAL ONCE
Status: COMPLETED | OUTPATIENT
Start: 2022-06-10 | End: 2022-06-10

## 2022-06-10 RX ADMIN — PSEUDOEPHEDRINE HYDROCHLORIDE 60 MG: 60 TABLET ORAL at 21:39

## 2022-06-10 RX ADMIN — ONDANSETRON 4 MG: 4 TABLET, ORALLY DISINTEGRATING ORAL at 21:39

## 2022-06-10 RX ADMIN — BENZONATATE 200 MG: 100 CAPSULE ORAL at 21:39

## 2022-06-10 RX ADMIN — LOPERAMIDE HYDROCHLORIDE 4 MG: 2 CAPSULE ORAL at 21:38

## 2022-06-10 ASSESSMENT — FIBROSIS 4 INDEX: FIB4 SCORE: .439024390243902439

## 2022-06-11 NOTE — ED TRIAGE NOTES
Lorene Haro  46 y.o. female    Chief Complaint   Patient presents with   • Sore Throat   • Diarrhea     Patient arrives with complaints of nausea, diarrhea, cough, and sore throat. COVID positive in ED 5 days ago. Denies chest pain. Endorses some SOB.     Denies vomiting. Endorses chills.     Vitals:    06/10/22 2014   BP: 120/53   Pulse: 80   Resp: 18   Temp: 36.7 °C (98 °F)   SpO2: 96%       Triage process explained to patient, apologized for wait time, and returned to lobby.  Pt informed to notify staff of any change in condition.

## 2022-06-11 NOTE — ED PROVIDER NOTES
ED Provider Note    Scribed for David Kong M.D. by David Kong M.D.. 6/10/2022,  8:50 PM.    CHIEF COMPLAINT  Chief Complaint   Patient presents with   • Sore Throat   • Diarrhea       HPI  Lorene Haro is a 46 y.o. female who presents to the Emergency Department reporting continued cough, sore throat, and diarrhea, in the setting of a known COVID infection.  She is surprised that 5 days after her diagnosis she is still feeling poorly.  When I explained that this is quite common, she feels reassured.  She has reassuring vital signs on arrival.  She does not have any new complaints other than those that she has been struggling with in the setting of COVID for the past few days.  She has not been taking any medications related to her diarrhea, though has had some GI issues in the past, with symptoms for GI support having been prescribed to her previously.    REVIEW OF SYSTEMS  See HPI for further details. All other systems are negative.     PAST MEDICAL HISTORY   has a past medical history of A-fib ([facility]), Asthma, Bipolar 2 disorder ([facility]), Chickenpox, Chronic obstructive pulmonary disease (HCC), Hypertension, On home oxygen therapy, Other psychological stress, Schizophrenic disorder ([facility]), and Yeast infection of the skin (4/1/2021).    SOCIAL HISTORY  Social History     Tobacco Use   • Smoking status: Former Smoker     Years: 20.00     Types: Cigarettes     Start date: 4/4/2022   • Smokeless tobacco: Never Used   Vaping Use   • Vaping Use: Never used   Substance and Sexual Activity   • Alcohol use: No   • Drug use: No   • Sexual activity: Not Currently     Social History     Substance and Sexual Activity   Drug Use No       SURGICAL HISTORY   has a past surgical history that includes hysterectomy laparoscopy; colectomy; cholecystectomy; and knee arthroscopy (Left).    CURRENT MEDICATIONS  Home Medications     Reviewed by Hanna Sam R.N. (Registered Nurse) on 06/10/22 at 2019  Med List Status:  "Not Addressed   Medication Last Dose Status   acetaminophen (TYLENOL) 325 MG Tab  Active   albuterol 108 (90 Base) MCG/ACT Aero Soln inhalation aerosol  Active   aripiprazole (ABILIFY) 30 MG tablet  Active   atorvastatin (LIPITOR) 20 MG Tab  Active   budesonide-formoterol (SYMBICORT) 160-4.5 MCG/ACT Aerosol  Active   doxycycline (MONODOX) 100 MG capsule  Active   famotidine (PEPCID) 40 MG Tab  Active   fluticasone (FLONASE) 50 MCG/ACT nasal spray  Active   furosemide (LASIX) 40 MG Tab  Active   gabapentin (NEURONTIN) 300 MG Cap  Active   ketoconazole (NIZORAL) 2 % Cream  Active   lidocaine (LIDODERM) 5 % Patch  Active   loperamide (IMODIUM) 2 MG Cap  Active   montelukast (SINGULAIR) 10 MG Tab  Active   nitroglycerin (NITROSTAT) 0.4 MG SL Tab  Active   nystatin (MYCOSTATIN) 335653 UNIT/GM Ointment  Active   ondansetron (ZOFRAN ODT) 4 MG TABLET DISPERSIBLE  Active   selenium sulfide 2.5% (SELSUN) 2.5 % Lotion  Active   spironolactone (ALDACTONE) 25 MG Tab  Active   tiotropium (SPIRIVA HANDIHALER) 18 MCG Cap  Active   traZODone (DESYREL) 50 MG Tab  Active   warfarin (COUMADIN) 6 MG Tab  Active                ALLERGIES  Allergies   Allergen Reactions   • Penicillin G Rash and Itching     pt reports that she gets a rash all over her body and gets itchy   • Amoxicillin Rash and Itching     Tolerates cephalosporins, pt reports that she gets a rash all over her body and gets itchy       PHYSICAL EXAM  VITAL SIGNS: /58   Pulse 68   Temp 36.9 °C (98.4 °F)   Resp 20   Ht 1.575 m (5' 2\")   Wt (!) 128 kg (281 lb 4.9 oz)   LMP  (LMP Unknown)   SpO2 98%   BMI 51.45 kg/m²   Pulse ox interpretation: I interpret this pulse ox as normal.  Constitutional: Alert in no apparent distress.  HENT: No signs of trauma, Bilateral external ears normal, Nose normal.   Eyes: Conjunctiva normal, Non-icteric.   Neck: Normal range of motion, Supple, No stridor.   Lymphatic: No lymphadenopathy noted.   Cardiovascular: Regular rate and " rhythm, no murmurs.   Thorax & Lungs: Occasional cough, normal breath sounds, No respiratory distress, No wheezing, No chest tenderness.   Abdomen: Bowel sounds normal, Soft, No tenderness, No masses, No pulsatile masses. No peritoneal signs.  Skin: Warm, Dry, No erythema, No rash.   Extremities: Intact distal pulses, No edema, No cyanosis.  Musculoskeletal: Good range of motion in all major joints. No or major deformities noted.   Neurologic: Alert , Normal motor function, Normal sensory function, No focal deficits noted.   Psychiatric: Affect normal, Judgment normal, Mood normal.       COURSE & MEDICAL DECISION MAKING  Nursing notes, VS, PMSFHx reviewed in chart.     8:50 PM Patient seen and examined at bedside.  She has a known COVID diagnosis within the past 5 days.  She has already been treated with monoclonal antibodies at the initial diagnosis.  She has symptoms consistent with this viral syndrome, which she did not realize were likely related.  She feels reassured, knowing that the symptoms are consistent with COVID.  She will be treated symptomatically here, and discharged with appropriate symptomatic support for home.       The patient will return for new or worsening symptoms and is stable at the time of discharge.    The patient is referred to a primary physician for blood pressure management, diabetic screening, and for all other preventative health concerns.      DISPOSITION:  Patient will be discharged home in stable condition.    FOLLOW UP:  Heriberto Sharp M.D.  [address] [place] [id number] [phone]    Schedule an appointment as soon as possible for a visit         OUTPATIENT MEDICATIONS:  Discharge Medication List as of 6/10/2022  9:34 PM      START taking these medications    Details   benzonatate (TESSALON) 100 MG Cap Take 1 Capsule by mouth 3 times a day as needed for Cough., Disp-60 Capsule, R-0, Normal      pseudoephedrine (SUDAFED) 30 MG Tab Take 1 Tablet by mouth every four  hours as needed for Congestion., Disp-30 Tablet, R-0, Normal               FINAL IMPRESSION  1. COVID    2. Nausea    3. Diarrhea, unspecified type

## 2022-06-11 NOTE — DISCHARGE INSTRUCTIONS
Your symptoms all go along with having COVID.  You can use the medicines we have prescribed to try to help with your symptoms.  It usually takes 10 to 14 days and sometimes longer to start feeling better from COVID.

## 2022-06-17 ENCOUNTER — HOSPITAL ENCOUNTER (EMERGENCY)
Facility: MEDICAL CENTER | Age: 47
End: 2022-06-17
Attending: EMERGENCY MEDICINE
Payer: MEDICAID

## 2022-06-17 VITALS
HEART RATE: 80 BPM | DIASTOLIC BLOOD PRESSURE: 58 MMHG | HEIGHT: 62 IN | WEIGHT: 283.07 LBS | SYSTOLIC BLOOD PRESSURE: 110 MMHG | BODY MASS INDEX: 52.09 KG/M2 | RESPIRATION RATE: 18 BRPM | OXYGEN SATURATION: 95 % | TEMPERATURE: 98 F

## 2022-06-17 DIAGNOSIS — B35.6 TINEA CRURIS: ICD-10-CM

## 2022-06-17 LAB
APPEARANCE UR: ABNORMAL
BACTERIA #/AREA URNS HPF: ABNORMAL /HPF
BILIRUB UR QL STRIP.AUTO: NEGATIVE
COLOR UR: ABNORMAL
EPI CELLS #/AREA URNS HPF: ABNORMAL /HPF
GLUCOSE UR STRIP.AUTO-MCNC: NEGATIVE MG/DL
HCG UR QL: NEGATIVE
HIV 1+2 AB+HIV1 P24 AG SERPL QL IA: NORMAL
HYALINE CASTS #/AREA URNS LPF: ABNORMAL /LPF
KETONES UR STRIP.AUTO-MCNC: ABNORMAL MG/DL
LEUKOCYTE ESTERASE UR QL STRIP.AUTO: ABNORMAL
MICRO URNS: ABNORMAL
NITRITE UR QL STRIP.AUTO: NEGATIVE
PH UR STRIP.AUTO: 5.5 [PH] (ref 5–8)
PROT UR QL STRIP: 30 MG/DL
RBC # URNS HPF: ABNORMAL /HPF
RBC UR QL AUTO: NEGATIVE
SP GR UR STRIP.AUTO: 1.02
T PALLIDUM AB SER QL IA: NORMAL
UROBILINOGEN UR STRIP.AUTO-MCNC: 1 MG/DL
WBC #/AREA URNS HPF: ABNORMAL /HPF

## 2022-06-17 PROCEDURE — 87591 N.GONORRHOEAE DNA AMP PROB: CPT

## 2022-06-17 PROCEDURE — 99284 EMERGENCY DEPT VISIT MOD MDM: CPT

## 2022-06-17 PROCEDURE — A9270 NON-COVERED ITEM OR SERVICE: HCPCS | Performed by: EMERGENCY MEDICINE

## 2022-06-17 PROCEDURE — 36415 COLL VENOUS BLD VENIPUNCTURE: CPT

## 2022-06-17 PROCEDURE — 81025 URINE PREGNANCY TEST: CPT

## 2022-06-17 PROCEDURE — 87389 HIV-1 AG W/HIV-1&-2 AB AG IA: CPT

## 2022-06-17 PROCEDURE — 81001 URINALYSIS AUTO W/SCOPE: CPT

## 2022-06-17 PROCEDURE — 86780 TREPONEMA PALLIDUM: CPT

## 2022-06-17 PROCEDURE — 87086 URINE CULTURE/COLONY COUNT: CPT

## 2022-06-17 PROCEDURE — 700102 HCHG RX REV CODE 250 W/ 637 OVERRIDE(OP): Performed by: EMERGENCY MEDICINE

## 2022-06-17 PROCEDURE — 87491 CHLMYD TRACH DNA AMP PROBE: CPT

## 2022-06-17 RX ORDER — NYSTATIN 100000 [USP'U]/G
1 POWDER TOPICAL 3 TIMES DAILY
Qty: 30 APPLICATION | Refills: 0 | Status: SHIPPED | OUTPATIENT
Start: 2022-06-17 | End: 2022-06-27

## 2022-06-17 RX ORDER — ACETAMINOPHEN 500 MG
1000 TABLET ORAL ONCE
Status: COMPLETED | OUTPATIENT
Start: 2022-06-17 | End: 2022-06-17

## 2022-06-17 RX ORDER — NYSTATIN 100000 [USP'U]/G
POWDER TOPICAL ONCE
Status: COMPLETED | OUTPATIENT
Start: 2022-06-17 | End: 2022-06-17

## 2022-06-17 RX ADMIN — ACETAMINOPHEN 1000 MG: 500 TABLET ORAL at 21:31

## 2022-06-17 RX ADMIN — NYSTATIN: 100000 POWDER TOPICAL at 21:45

## 2022-06-17 ASSESSMENT — FIBROSIS 4 INDEX: FIB4 SCORE: .439024390243902439

## 2022-06-18 LAB
C TRACH DNA SPEC QL NAA+PROBE: NEGATIVE
N GONORRHOEA DNA SPEC QL NAA+PROBE: NEGATIVE
SPECIMEN SOURCE: NORMAL

## 2022-06-18 NOTE — ED PROVIDER NOTES
ED Provider Note    CHIEF COMPLAINT  Chief Complaint   Patient presents with   • Rash     Pt has a rash on bilateral upper thighs and groin area. Rash has bee going on for about a week. Pt states it is itchy and bleeding.    • Painful Urination     Began about a week ago.        HPI  Lorene Haro is a 46 y.o. female who presents to the emergency department chief complaint of 1 week to her genital area.  She states that she is bleeding and is very uncomfortable.  She also states that this going down into her groin area and states painful when she pees.  She denies vaginal discharge itching or burning.  She states is not internal discomfort or external discomfort in terms of the pain with urination no flank pain no nausea no vomiting.  She states her blood sugars have been under control with a history of diabetes hypertension    REVIEW OF SYSTEMS  Positives as above. Pertinent negatives include nausea vomiting flank pain abdominal pain diarrhea constipation  All other review of systems are negative    PAST MEDICAL HISTORY   has a past medical history of A-fib ([facility]), Asthma, Bipolar 2 disorder ([facility]), Chickenpox, Chronic obstructive pulmonary disease ([facility]), Hypertension, On home oxygen therapy, Other psychological stress, Schizophrenic disorder ([facility]), and Yeast infection of the skin (4/1/2021).    SOCIAL HISTORY  Social History     Tobacco Use   • Smoking status: Former Smoker     Years: 20.00     Types: Cigarettes     Start date: 4/4/2022   • Smokeless tobacco: Never Used   Vaping Use   • Vaping Use: Never used   Substance and Sexual Activity   • Alcohol use: No   • Drug use: No   • Sexual activity: Not Currently       SURGICAL HISTORY   has a past surgical history that includes hysterectomy laparoscopy; colectomy; cholecystectomy; and knee arthroscopy (Left).    CURRENT MEDICATIONS  Home Medications    **Home medications have not yet been reviewed for this encounter**         ALLERGIES  Allergies   Allergen  "Reactions   • Penicillin G Rash and Itching     pt reports that she gets a rash all over her body and gets itchy   • Amoxicillin Rash and Itching     Tolerates cephalosporins, pt reports that she gets a rash all over her body and gets itchy       PHYSICAL EXAM  VITAL SIGNS: /59   Pulse 77   Temp 36.6 °C (97.8 °F) (Temporal)   Resp 16   Ht 1.575 m (5' 2\")   Wt (!) 128 kg (283 lb 1.1 oz)   LMP  (LMP Unknown)   SpO2 93%   BMI 51.77 kg/m²    Pulse ox interpretation: I interpret this pulse ox as normal.  Constitutional: Alert in no apparent distress.  Obese female  HENT: Normocephalic, Atraumatic, MMM  Eyes: PERound. Conjunctiva normal, non-icteric.   Heart: Regular rate and rhythm, no murmurs.    Lungs: Clear to auscultation bilaterally. No resp distress, breath sounds equal  Abdomen: Non-tender, non-distended, normal bowel sounds  Skin: Warm, Dry, and the patient's pannus in the suprapubic region there is evidence of an erythematous rash with satellite lesions and caseous discharge goes into the intertriginous spaces as well as in the external groin area consistent with tinea  Neurologic: Alert and oriented, Grossly non-focal.       DIFFERENTIAL DIAGNOSIS AND WORK UP PLAN    This is a 46 y.o. female who presents with large tinea infection under her pannus and in the intertriginous spaces, she has issues reaching that area on her own but she does have a shower at home we discussed the importance of showering every day for the next week as well as allowing the area to air dry and then nystatin powder.  She will be cleansed and bathed here in the emergency department nystatin powder will be applied.  She also be treated with oral Tylenol    Urinalysis contaminated but I suspect her dysuria is most likely from the tinea that is involving the external genitalia there is no other evidence of infection Chlamydia gonorrhea was sent she is negative for syphilis    Pertinent Lab Findings  Labs Reviewed "   URINALYSIS,CULTURE IF INDICATED - Abnormal; Notable for the following components:       Result Value    Character Cloudy (*)     Ketones Trace (*)     Protein 30 (*)     Leukocyte Esterase Small (*)     All other components within normal limits    Narrative:     Indication for culture:->Patient WITHOUT an indwelling Henao  catheter in place with new onset of Dysuria, Frequency,  Urgency, and/or Suprapubic pain   URINE MICROSCOPIC (W/UA) - Abnormal; Notable for the following components:    WBC 20-50 (*)     Bacteria Moderate (*)     Epithelial Cells Moderate (*)     Hyaline Cast 11-20 (*)     All other components within normal limits    Narrative:     Indication for culture:->Patient WITHOUT an indwelling Henao  catheter in place with new onset of Dysuria, Frequency,  Urgency, and/or Suprapubic pain   CHLAMYDIA/GC, PCR (URINE)    Narrative:     Indication for culture:->Patient WITHOUT an indwelling Henao  catheter in place with new onset of Dysuria, Frequency,  Urgency, and/or Suprapubic pain   T.PALLIDUM AB EIA   HIV AG/AB COMBO ASSAY SCREENING   HCG QUALITATIVE UR    Narrative:     Indication for culture:->Patient WITHOUT an indwelling Henao  catheter in place with new onset of Dysuria, Frequency,  Urgency, and/or Suprapubic pain   URINE CULTURE(NEW)    Narrative:     Indication for culture:->Patient WITHOUT an indwelling Henao  catheter in place with new onset of Dysuria, Frequency,  Urgency, and/or Suprapubic pain       I verified that the patient was wearing a mask and I was wearing appropriate PPE every time I entered the room. The patient's mask was on the patient at all times during my encounter except for a brief view of the oropharynx.    The patient will return for new or worsening symptoms and is stable at the time of discharge.    The patient is referred to a primary physician for blood pressure management, diabetic screening, and for all other preventative health concerns.    DISPOSITION:  Patient  will be discharged home in stable condition.    FOLLOW UP:  Heriberto Sharp M.D.  [address] [facility] [id number] [phone]    Schedule an appointment as soon as possible for a visit       [facility], Emergency Dept  [address] [facility]  [place] [id number] [phone]    If symptoms worsen      OUTPATIENT MEDICATIONS:  Discharge Medication List as of 6/17/2022 10:48 PM      START taking these medications    Details   nystatin (MYCOSTATIN) powder Apply 1 g topically in the morning, at noon, and at bedtime for 10 days., Disp-30 Application, R-0, Normal                 FINAL IMPRESSION  1. Tinea cruris  nystatin (MYCOSTATIN) powder              Electronically signed by: Candis Mon M.D., 6/17/2022 9:13 PM    This dictation has been created using voice recognition software and/or scribes. The accuracy of the dictation is limited by the abilities of the software and the expertise of the scribes. I expect there may be some errors of grammar and possibly content. I made every attempt to manually correct the errors within my dictation. However, errors related to voice recognition software and/or scribes may still exist and should be interpreted within the appropriate context.

## 2022-06-18 NOTE — ED NOTES
Pt given d/c instructions, f/u info and RX x 1 with verbal understanding.  VSS at discharge.   All belongings in possession on discharge.  Pt escorted to the lobby by RN.

## 2022-06-20 LAB
BACTERIA UR CULT: NORMAL
SIGNIFICANT IND 70042: NORMAL
SITE SITE: NORMAL
SOURCE SOURCE: NORMAL

## 2022-07-03 ENCOUNTER — SLEEP STUDY (OUTPATIENT)
Dept: SLEEP MEDICINE | Facility: MEDICAL CENTER | Age: 47
End: 2022-07-03
Attending: PHYSICIAN ASSISTANT
Payer: MEDICAID

## 2022-07-03 DIAGNOSIS — G47.33 OSA (OBSTRUCTIVE SLEEP APNEA): ICD-10-CM

## 2022-07-03 PROCEDURE — 95811 POLYSOM 6/>YRS CPAP 4/> PARM: CPT | Performed by: STUDENT IN AN ORGANIZED HEALTH CARE EDUCATION/TRAINING PROGRAM

## 2022-07-04 NOTE — PROCEDURES
MONTAGE: Standard  STUDY TYPE: Treatment iVAPs    RECORDING TECHNIQUE:   After the scalp was prepared, gold plated electrodes were applied to the scalp according to the International 10-20 System. EEG (electroencephalogram) was continuously monitored from the O1-M2, O2-M1, C3-M2, C4-M1, F3-M2, and F4-M1. EOGs (electrooculograms) were monitored by electrodes placed at the left and right outer canthi. Chin EMG (electromyogram) was monitored by electrodes placed on the mentalis and sub-mentalis muscles. Nasal and oral airflow were monitored using a triple port thermocouple as well as oronasal pressure transducer. Respiratory effort was measured by inductive plethysmography technology employing abdominal and thoracic belts. Blood oxygen saturation and pulse were monitored by pulse oximetry. Heart rhythm was monitored by surface electrocardiogram. Leg EMG was studied using surface electrodes placed on left and right anterior tibialis. A microphone was used to monitor tracheal sounds and snoring. Body position was monitored and documented by technician observation.     SCORING CRITERIA:   A modification of the AASM manual for scoring of sleep and associated events was used. Obstructive apneas were scored by cessation of airflow for at least 10 seconds with continuing respiratory effort. Central apneas were scored by cessation of airflow for at least 10 seconds with no respiratory effort. Hypopneas were scored by a 30% or more reduction in airflow for at least 10 seconds accompanied by arterial oxygen desaturation of 3% or an arousal. For CMS (Medicare) patients, per AASM rule 1B, hypopneas are scored by 30% with mild reduction in airflow for at least 10 seconds accompanied by arterial saturation decreased at 4%.    Study start time was 08:37:15 PM. Diagnostic recording time was 8h 26.0m with a total sleep time of 7h 13.0m resulting in a sleep efficiency of 85.57%%. Sleep latency from the start of the study was 01 minutes  and the latency from sleep to REM was 91 minutes. In total,71 arousals were scored for an arousal index of 9.8.  Respiratory:  There were a total of 20 apneas consisting of 7 obstructive apneas, 0 mixed apneas, and 13 central apneas. A total of 279 hypopneas were scored. The apnea index was 2.77 per hour and the hypopnea index was 38.66 per hour resulting in an overall AHI of 41.43. AHI during rem was 53.0 and AHI while supine was 41.43.  Oximetry:  There was a mean oxygen saturation of 89.0%. The minimum oxygen saturation in NREM was 79.0 % and in REM was 75.0%. The patient spent 179.6 minutes of TST with SaO2 <88%.  Cardiac:  The highest heart rate seen while awake was 95 BPM while the highest heart rate during sleep was 88 BPM with an average sleeping heart rate of 66 BPM.  Limb Movements:  There were a total of 68 PLMs during sleep which resulted in a PLMS index of 9.4. Of these, 1 were associated with arousals which resulted in a PLMS arousal index of 0.1.  Titration: CPAP was tried from 5-12cm H2O. Bipap was tried from 15/11-25/20cm H2O. Serveral iVAPS settings were tried starting with EPAP 10cm H2O Min/Max PS 4/20cm H2O Target TX 14 and Va 4.6.  This was a fully attended sleep study. This test was technically adequate.    Impression:  1.  Known severe obstructive sleep apnea with significant hypoxia previously on AVAPS  2.  Difficult to control sleep apnea however does have improvement from baseline  3.  Did have improvement with AVAPS therapy  4.  Nocturnal hypoxia likely due to sleep apnea      Recommendations:  I recommend AVAPS EPAP 11 pressure support 4-14, backup rate 14 breaths/min, TI 1, rise time 1, tidal volume 450 mL.   Patient used a small AirFit F 30 i during study.  Would recommend once stable on noninvasive ventilation and respiratory events well controlled follow-up with overnight pulse oximetry study to assess oxygenation.     I also recommend 45 day compliance download to assess the efficacy  to the recommended pressure, measure leak, apnea hypopnea index and compliance for further outpatient monitoring and management of iVAPs therapy. In some cases alternative treatment options may be proven effective in resolving sleep apnea. These options include upper airway surgery, the use of a dental orthotic, weight loss orpositional therapy. Clinical correlation is required. In general patients with sleep apnea are advised to avoid alcohol, sedatives and not to operate a motor vehicle while drowsy.  Untreated sleep apnea increases the risk for cardiovascular and neurovascular disease.

## 2022-07-08 NOTE — ED TRIAGE NOTES
"Lorene Bonnie Haro  45 y.o. female  Chief Complaint   Patient presents with   • RLQ Pain     Pt reports RLQ pain that radiates to the right flank that started roughly 24 hours ago, 8/10 constant sharp pain. Pt still has their appendix. +RLQ tenderness.   • Flank Pain     Reports after a void pt still has the urge to continue going but is unable to.     Bilateral pressures taken, similar.    Pt ambulatory to triage with steady gait for above complaint.   Pt is alert and oriented, speaking in full sentences, follows commands and responds appropriately to questions. Resp are even and unlabored. No behavioral indicators of pain.   Pt placed in lobby. Pt educated on triage process. Pt encouraged to alert staff for any changes.    /79   Pulse 89   Temp 36.2 °C (97.1 °F) (Temporal)   Resp 16   Ht 1.549 m (5' 1\")   Wt (!) 130.5 kg (287 lb 11.2 oz)   SpO2 97%     " no diabetes and no thyroid trouble.

## 2022-07-13 ENCOUNTER — OFFICE VISIT (OUTPATIENT)
Dept: CARDIOLOGY | Facility: MEDICAL CENTER | Age: 47
End: 2022-07-13
Payer: MEDICAID

## 2022-07-13 ENCOUNTER — TELEPHONE (OUTPATIENT)
Dept: CARDIOLOGY | Facility: MEDICAL CENTER | Age: 47
End: 2022-07-13

## 2022-07-13 VITALS
SYSTOLIC BLOOD PRESSURE: 114 MMHG | BODY MASS INDEX: 53.18 KG/M2 | OXYGEN SATURATION: 93 % | RESPIRATION RATE: 18 BRPM | DIASTOLIC BLOOD PRESSURE: 68 MMHG | HEIGHT: 62 IN | HEART RATE: 74 BPM | WEIGHT: 289 LBS

## 2022-07-13 DIAGNOSIS — I48.0 PAROXYSMAL ATRIAL FIBRILLATION (HCC): ICD-10-CM

## 2022-07-13 DIAGNOSIS — I50.32 CHRONIC DIASTOLIC HEART FAILURE (HCC): ICD-10-CM

## 2022-07-13 PROCEDURE — 99215 OFFICE O/P EST HI 40 MIN: CPT | Performed by: INTERNAL MEDICINE

## 2022-07-13 ASSESSMENT — ENCOUNTER SYMPTOMS
SHORTNESS OF BREATH: 1
WEIGHT LOSS: 0
SYNCOPE: 0
NAUSEA: 0
FLANK PAIN: 0
PND: 0
BLURRED VISION: 0
ABDOMINAL PAIN: 0
DIARRHEA: 0
NEAR-SYNCOPE: 0
VOMITING: 0
DECREASED APPETITE: 0
HEARTBURN: 0
BACK PAIN: 0
CLAUDICATION: 0
DIZZINESS: 0
FEVER: 0
PALPITATIONS: 0
WEIGHT GAIN: 0
ALTERED MENTAL STATUS: 0
ORTHOPNEA: 0
COUGH: 0
DEPRESSION: 0
CONSTIPATION: 0
DYSPNEA ON EXERTION: 0
IRREGULAR HEARTBEAT: 0

## 2022-07-13 ASSESSMENT — FIBROSIS 4 INDEX: FIB4 SCORE: .439024390243902439

## 2022-07-13 NOTE — PROGRESS NOTES
Cardiology Note    Chief Complaint   Patient presents with   • Hypertension     F/V DX: Pulmonary hypertension (HCC)       History of Present Illness: Lorene Haro is a 46 y.o. female PMH KATHIA on cpap, restrictive/obstructive stalin disease, morbid obesity, current smoker, ?AF, diastolic heart failure who presents for follow up visit.    Dyspnea baseline. Palpitations about twice monthly, baseline. No other active cardiac complaints. Still smokes tobacco about 1-2 daily. Now off cpap awaiting new machine. Describes used to be on coumadin but underwent event monitor without finding AF and was taken off. Compliant with remaining medications.     Review of Systems   Constitutional: Negative for decreased appetite, fever, malaise/fatigue, weight gain and weight loss.   HENT: Negative for congestion and nosebleeds.    Eyes: Negative for blurred vision.   Cardiovascular: Negative for chest pain, claudication, dyspnea on exertion, irregular heartbeat, leg swelling, near-syncope, orthopnea, palpitations, paroxysmal nocturnal dyspnea and syncope.   Respiratory: Positive for shortness of breath. Negative for cough.    Endocrine: Negative for cold intolerance and heat intolerance.   Skin: Negative for rash.   Musculoskeletal: Negative for back pain.   Gastrointestinal: Negative for abdominal pain, constipation, diarrhea, heartburn, melena, nausea and vomiting.   Genitourinary: Negative for dysuria, flank pain and hematuria.   Neurological: Negative for dizziness.   Psychiatric/Behavioral: Negative for altered mental status and depression.         Past Medical History:   Diagnosis Date   • A-fib (HCC)    • Asthma    • Bipolar 2 disorder (HCC)    • Chickenpox    • Chronic obstructive pulmonary disease (HCC)    • Hypertension    • On home oxygen therapy    • Other psychological stress    • Schizophrenic disorder (HCC)    • Yeast infection of the skin 4/1/2021         Past Surgical History:   Procedure Laterality Date   •  CHOLECYSTECTOMY     • COLECTOMY     • HYSTERECTOMY LAPAROSCOPY     • KNEE ARTHROSCOPY Left          Current Outpatient Medications   Medication Sig Dispense Refill   • benzonatate (TESSALON) 100 MG Cap Take 1 Capsule by mouth 3 times a day as needed for Cough. 60 Capsule 0   • pseudoephedrine (SUDAFED) 30 MG Tab Take 1 Tablet by mouth every four hours as needed for Congestion. 30 Tablet 0   • ondansetron (ZOFRAN ODT) 4 MG TABLET DISPERSIBLE Take 1 Tablet by mouth every 8 hours as needed for Nausea. 10 Tablet 0   • loperamide (IMODIUM) 2 MG Cap Take 1 Capsule by mouth 4 times a day as needed for Diarrhea. 30 Capsule 0   • doxycycline (MONODOX) 100 MG capsule Take 1 Capsule by mouth 2 times a day. 20 Capsule 0   • furosemide (LASIX) 40 MG Tab Take 1 Tablet by mouth every day. 90 Tablet 3   • ketoconazole (NIZORAL) 2 % Cream Apply 1 g topically every day. 60 g 0   • selenium sulfide 2.5% (SELSUN) 2.5 % Lotion Apply to scalp once daily 118 mL 3   • spironolactone (ALDACTONE) 25 MG Tab Take 1 Tablet by mouth every day. 30 Tablet 1   • lidocaine (LIDODERM) 5 % Patch Place 1 Patch on the skin every 24 hours. 10 Patch 0   • gabapentin (NEURONTIN) 300 MG Cap Take 1 Capsule by mouth 3 times a day. 60 Capsule 0   • fluticasone (FLONASE) 50 MCG/ACT nasal spray ADMOINISTER 1 TO 2 SPRAYS INTO AFFECTED NOSTRIL(S) TWICE DAILY 16 g 3   • montelukast (SINGULAIR) 10 MG Tab TAKE 1 TABLET BY MOUTH EVERY NIGHT AT BEDTIME 90 Tablet 3   • budesonide-formoterol (SYMBICORT) 160-4.5 MCG/ACT Aerosol Inhale 2 Puffs 2 times a day. 3 Each 3   • tiotropium (SPIRIVA HANDIHALER) 18 MCG Cap Place 1 Capsule into inhaler and inhale every day. 30 Capsule 3   • acetaminophen (TYLENOL) 325 MG Tab Take 325 mg by mouth every 8 hours as needed (Generalized pain). Prescription is for 4 tabs every 8 hours prn, patient states she takes 2 tabls every 4 hours prn     • atorvastatin (LIPITOR) 20 MG Tab Take 20 mg by mouth at bedtime.     • traZODone (DESYREL) 50  MG Tab Take 100 mg by mouth at bedtime.     • famotidine (PEPCID) 40 MG Tab Take 40 mg by mouth every day. Indications: Heartburn     • nitroglycerin (NITROSTAT) 0.4 MG SL Tab Place 1 tablet under the tongue as needed for Chest Pain. 25 tablet 0   • albuterol 108 (90 Base) MCG/ACT Aero Soln inhalation aerosol Inhale 2 Puffs every 6 hours as needed for Shortness of Breath. 8.5 g 0   • aripiprazole (ABILIFY) 30 MG tablet Take 30 mg by mouth every morning.     • warfarin (COUMADIN) 6 MG Tab Take one to one and one-half (1-1.5) tablets daily as directed by [facility] (Patient not taking: No sig reported) 135 Tablet 1     No current facility-administered medications for this visit.         Allergies   Allergen Reactions   • Penicillin G Rash and Itching     pt reports that she gets a rash all over her body and gets itchy   • Amoxicillin Rash and Itching     Tolerates cephalosporins, pt reports that she gets a rash all over her body and gets itchy         Family History   Problem Relation Age of Onset   • No Known Problems Mother    • Cancer Sister          Social History     Socioeconomic History   • Marital status: Single     Spouse name: Not on file   • Number of children: Not on file   • Years of education: Not on file   • Highest education level: Not on file   Occupational History   • Not on file   Tobacco Use   • Smoking status: Former Smoker     Years: 20.00     Types: Cigarettes     Start date: 4/4/2022   • Smokeless tobacco: Never Used   Vaping Use   • Vaping Use: Never used   Substance and Sexual Activity   • Alcohol use: No   • Drug use: No   • Sexual activity: Not Currently   Other Topics Concern   • Not on file   Social History Narrative    From [place]     Social Determinants of Health     Financial Resource Strain: Medium Risk   • Difficulty of Paying Living Expenses: Somewhat hard   Food Insecurity: Food Insecurity Present   • Worried About Running Out of Food in the Last Year: Often  "true   • Ran Out of Food in the Last Year: Often true   Transportation Needs: No Transportation Needs   • Lack of Transportation (Medical): No   • Lack of Transportation (Non-Medical): No   Physical Activity: Not on file   Stress: Not on file   Social Connections: Not on file   Intimate Partner Violence: Not on file   Housing Stability: Not on file         Physical Exam:  Ambulatory Vitals  /68 (BP Location: Right arm, Patient Position: Sitting, BP Cuff Size: Adult)   Pulse 74   Resp 18   Ht 1.575 m (5' 2\")   Wt (!) 131 kg (289 lb)   SpO2 93%    BP Readings from Last 4 Encounters:   07/13/22 114/68   06/17/22 110/58   06/10/22 113/58   06/05/22 (!) 142/80     Weight/BMI:   Vitals:    07/13/22 1039   BP: 114/68   Weight: (!) 131 kg (289 lb)   Height: 1.575 m (5' 2\")    Body mass index is 52.86 kg/m².  Wt Readings from Last 4 Encounters:   07/13/22 (!) 131 kg (289 lb)   06/17/22 (!) 128 kg (283 lb 1.1 oz)   06/10/22 (!) 128 kg (281 lb 4.9 oz)   06/05/22 124 kg (273 lb)       Physical Exam  Constitutional:       General: She is not in acute distress.  HENT:      Head: Normocephalic and atraumatic.   Eyes:      Conjunctiva/sclera: Conjunctivae normal.      Pupils: Pupils are equal, round, and reactive to light.   Neck:      Vascular: No JVD.   Cardiovascular:      Rate and Rhythm: Normal rate and regular rhythm.      Heart sounds: Normal heart sounds. No murmur heard.    No friction rub. No gallop.   Pulmonary:      Effort: Pulmonary effort is normal. No respiratory distress.      Breath sounds: Normal breath sounds. No wheezing or rales.   Chest:      Chest wall: No tenderness.   Abdominal:      General: Bowel sounds are normal. There is no distension.      Palpations: Abdomen is soft.   Musculoskeletal:      Cervical back: Normal range of motion and neck supple.   Skin:     General: Skin is warm and dry.   Neurological:      Mental Status: She is alert and oriented to person, place, and time.   Psychiatric: "         Mood and Affect: Affect normal.         Judgment: Judgment normal.         Lab Data Review:  Lab Results   Component Value Date/Time    CHOLSTRLTOT 198 05/10/2021 05:42 AM     (H) 05/10/2021 05:42 AM    HDL 48 05/10/2021 05:42 AM    TRIGLYCERIDE 126 05/10/2021 05:42 AM       Lab Results   Component Value Date/Time    SODIUM 141 05/30/2022 12:13 AM    POTASSIUM 3.7 05/30/2022 12:13 AM    CHLORIDE 101 05/30/2022 12:13 AM    CO2 28 05/30/2022 12:13 AM    GLUCOSE 144 (H) 05/30/2022 12:13 AM    BUN 18 05/30/2022 12:13 AM    CREATININE 1.06 05/30/2022 12:13 AM    CREATININE 0.9 09/11/2008 01:45 PM     CrCl cannot be calculated (Patient's most recent lab result is older than the maximum 7 days allowed.).  Lab Results   Component Value Date/Time    ALKPHOSPHAT 112 (H) 05/30/2022 12:13 AM    ASTSGOT 9 (L) 05/30/2022 12:13 AM    ALTSGPT 16 05/30/2022 12:13 AM    TBILIRUBIN 0.7 05/30/2022 12:13 AM      Lab Results   Component Value Date/Time    WBC 13.8 (H) 05/30/2022 12:13 AM     Lab Results   Component Value Date/Time    HBA1C 6.0 (H) 05/20/2022 05:00 AM    HBA1C 6.3 (H) 10/07/2021 12:09 AM     No components found for: TROP      Cardiac Imaging and Procedures Review:      TTE 11/18/2019  CONCLUSIONS  Compared to the images of the study done on 11/14/2018 - there has been   no significant change.   Normal left ventricular systolic function.  Left ventricular ejection fraction is visually estimated to be 60%.  Normal diastolic function.  Normal right ventricular systolic function.  Mild mitral regurgitation.  Moderate tricuspid regurgitation.  Right ventricular systolic pressure is estimated to be 65 mmHg.    TTE 11/14/2018  CONCLUSIONS  No prior study is available for comparison.   Technically difficult study - adequate information is obtained.   Grossly normal left ventricular systolic function.  Left ventricular ejection fraction is visually estimated to be 65%.  Probable mild aortic stenosis.  Dilated  inferior vena cava without   inspiratory collapse.  Estimated right ventricular systolic pressure is   60 mmHg.    PFTs 9/2021  Interpretation;   Baseline spirometry shows significant reduction in FVC at 1.45 L or 46% predicted and FEV1 at 1.24 L or 48% predicted with FEV1/FVC ratio of 86 suggesting restriction.  There is significant bronchodilator response with postbronchodilator FEV1 of 1.48 L or 58% predicted.  Total lung capacity is just below lower limits of normal at 3.61 L or 78% predicted.  Diffusion capacity is within normal limits at 85% predicted.  Pulmonary function testing shows mixed restrictive obstructive defect with significant bronchodilator response.  This could be consistent with COPD in a patient with morbid obesity.  Correlate clinically and with imaging    MPI spect stress 2/2022  NUCLEAR IMAGING INTERPRETATION   No evidence of significant jeopardized viable myocardium or prior myocardial    infarction.   Normal left ventricular size, ejection fraction, and wall motion.   ECG INTERPRETATION   Negative stress ECG for ischemia.    Event monitor 5/2022  Procedure: biotel monitor; 5d 23h; 4/13/22   Indication: ?paroxysmal AF   Quality: good   Findings:   Underlying rhythm: Predominantly sinus rhythm with average rate 73 bpm. No atrial fibrillation nor flutter.   Atrial events: Rare ectopy.   Ventricular events: Rare ectopy.   Patient events: No patient triggered events.   Impressions:   No atrial fibrillation nor flutter detected.     Medical Decision Making:  Problem List Items Addressed This Visit     A-fib (HCC)    Relevant Orders    CL-IMPLANTABLE LOOP RECORDER    Chronic diastolic heart failure (HCC)        Atrial fibrillation, paroxysmal - refer for loop recorder for longer term monitoring. If has recurrence, resume anticoagulation for cva prevention. chadsvasc     Shortness of breath multifactorial with estimated greatest contributors pulmonary disease and morbid obesity. Can acontinue  lasix and spironolactone for diastolic heart failure.    Tobacco use - discussed for 6 min. Continues to smoke. She uses very little. She will work hard on strictly avoiding.    It was my pleasure to meet with Ms. Haro.    A total of 45 minutes of time was spent on day of encounter reviewing medical record, performing history and examination, counseling, ordering medication/test/consults and documentation.

## 2022-07-13 NOTE — TELEPHONE ENCOUNTER
Lalo Daniels,      Patient was informed you will be reaching out to schedule: CL-IMPLANTABLE LOOP RECORDER [[id number]] . Ordered per Dr. Clinton, Thank you.

## 2022-07-13 NOTE — TELEPHONE ENCOUNTER
Patient scheduled for loop insert on 7-19-22 with Dr. Barraza. Patient has been instructed to check in at 9:30 for 11:30 case time. Message sent to ana lilia Leon with [employer] notified.

## 2022-07-15 ENCOUNTER — PRE-ADMISSION TESTING (OUTPATIENT)
Dept: ADMISSIONS | Facility: MEDICAL CENTER | Age: 47
End: 2022-07-15
Attending: INTERNAL MEDICINE
Payer: MEDICAID

## 2022-07-15 ASSESSMENT — FIBROSIS 4 INDEX: FIB4 SCORE: .439024390243902439

## 2022-07-16 ENCOUNTER — APPOINTMENT (OUTPATIENT)
Dept: RADIOLOGY | Facility: MEDICAL CENTER | Age: 47
End: 2022-07-16
Attending: EMERGENCY MEDICINE
Payer: MEDICAID

## 2022-07-16 ENCOUNTER — HOSPITAL ENCOUNTER (EMERGENCY)
Facility: MEDICAL CENTER | Age: 47
End: 2022-07-16
Attending: EMERGENCY MEDICINE
Payer: MEDICAID

## 2022-07-16 VITALS
SYSTOLIC BLOOD PRESSURE: 118 MMHG | HEART RATE: 80 BPM | OXYGEN SATURATION: 99 % | HEIGHT: 62 IN | DIASTOLIC BLOOD PRESSURE: 54 MMHG | TEMPERATURE: 98 F | RESPIRATION RATE: 19 BRPM | BODY MASS INDEX: 53.18 KG/M2 | WEIGHT: 289 LBS

## 2022-07-16 DIAGNOSIS — R07.9 CHEST PAIN, UNSPECIFIED TYPE: ICD-10-CM

## 2022-07-16 LAB
ALBUMIN SERPL BCP-MCNC: 4.1 G/DL (ref 3.2–4.9)
ALBUMIN/GLOB SERPL: 1.2 G/DL
ALP SERPL-CCNC: 118 U/L (ref 30–99)
ALT SERPL-CCNC: 13 U/L (ref 2–50)
ANION GAP SERPL CALC-SCNC: 10 MMOL/L (ref 7–16)
AST SERPL-CCNC: 11 U/L (ref 12–45)
BASOPHILS # BLD AUTO: 0.3 % (ref 0–1.8)
BASOPHILS # BLD: 0.03 K/UL (ref 0–0.12)
BILIRUB SERPL-MCNC: 0.7 MG/DL (ref 0.1–1.5)
BUN SERPL-MCNC: 7 MG/DL (ref 8–22)
CALCIUM SERPL-MCNC: 9 MG/DL (ref 8.5–10.5)
CHLORIDE SERPL-SCNC: 104 MMOL/L (ref 96–112)
CO2 SERPL-SCNC: 28 MMOL/L (ref 20–33)
CREAT SERPL-MCNC: 0.93 MG/DL (ref 0.5–1.4)
D DIMER PPP IA.FEU-MCNC: 0.29 UG/ML (FEU) (ref 0–0.5)
EKG IMPRESSION: NORMAL
EOSINOPHIL # BLD AUTO: 0.15 K/UL (ref 0–0.51)
EOSINOPHIL NFR BLD: 1.3 % (ref 0–6.9)
ERYTHROCYTE [DISTWIDTH] IN BLOOD BY AUTOMATED COUNT: 55.7 FL (ref 35.9–50)
GFR SERPLBLD CREATININE-BSD FMLA CKD-EPI: 75 ML/MIN/1.73 M 2
GLOBULIN SER CALC-MCNC: 3.3 G/DL (ref 1.9–3.5)
GLUCOSE SERPL-MCNC: 69 MG/DL (ref 65–99)
HCT VFR BLD AUTO: 39.4 % (ref 37–47)
HGB BLD-MCNC: 12 G/DL (ref 12–16)
IMM GRANULOCYTES # BLD AUTO: 0.07 K/UL (ref 0–0.11)
IMM GRANULOCYTES NFR BLD AUTO: 0.6 % (ref 0–0.9)
INR PPP: 1.08 (ref 0.87–1.13)
LYMPHOCYTES # BLD AUTO: 1.82 K/UL (ref 1–4.8)
LYMPHOCYTES NFR BLD: 15.2 % (ref 22–41)
MCH RBC QN AUTO: 26.5 PG (ref 27–33)
MCHC RBC AUTO-ENTMCNC: 30.5 G/DL (ref 33.6–35)
MCV RBC AUTO: 87.2 FL (ref 81.4–97.8)
MONOCYTES # BLD AUTO: 0.77 K/UL (ref 0–0.85)
MONOCYTES NFR BLD AUTO: 6.4 % (ref 0–13.4)
NEUTROPHILS # BLD AUTO: 9.11 K/UL (ref 2–7.15)
NEUTROPHILS NFR BLD: 76.2 % (ref 44–72)
NRBC # BLD AUTO: 0 K/UL
NRBC BLD-RTO: 0 /100 WBC
PLATELET # BLD AUTO: 276 K/UL (ref 164–446)
PMV BLD AUTO: 10.6 FL (ref 9–12.9)
POTASSIUM SERPL-SCNC: 3.3 MMOL/L (ref 3.6–5.5)
PROT SERPL-MCNC: 7.4 G/DL (ref 6–8.2)
PROTHROMBIN TIME: 13.8 SEC (ref 12–14.6)
RBC # BLD AUTO: 4.52 M/UL (ref 4.2–5.4)
SODIUM SERPL-SCNC: 142 MMOL/L (ref 135–145)
TROPONIN T SERPL-MCNC: 30 NG/L (ref 6–19)
TROPONIN T SERPL-MCNC: 35 NG/L (ref 6–19)
WBC # BLD AUTO: 12 K/UL (ref 4.8–10.8)

## 2022-07-16 PROCEDURE — 99285 EMERGENCY DEPT VISIT HI MDM: CPT

## 2022-07-16 PROCEDURE — 36415 COLL VENOUS BLD VENIPUNCTURE: CPT

## 2022-07-16 PROCEDURE — 85610 PROTHROMBIN TIME: CPT

## 2022-07-16 PROCEDURE — 84484 ASSAY OF TROPONIN QUANT: CPT

## 2022-07-16 PROCEDURE — 85025 COMPLETE CBC W/AUTO DIFF WBC: CPT

## 2022-07-16 PROCEDURE — 71045 X-RAY EXAM CHEST 1 VIEW: CPT

## 2022-07-16 PROCEDURE — 700111 HCHG RX REV CODE 636 W/ 250 OVERRIDE (IP): Performed by: EMERGENCY MEDICINE

## 2022-07-16 PROCEDURE — 85379 FIBRIN DEGRADATION QUANT: CPT

## 2022-07-16 PROCEDURE — 80053 COMPREHEN METABOLIC PANEL: CPT

## 2022-07-16 PROCEDURE — 96374 THER/PROPH/DIAG INJ IV PUSH: CPT

## 2022-07-16 PROCEDURE — A9270 NON-COVERED ITEM OR SERVICE: HCPCS | Performed by: EMERGENCY MEDICINE

## 2022-07-16 PROCEDURE — 93005 ELECTROCARDIOGRAM TRACING: CPT | Performed by: EMERGENCY MEDICINE

## 2022-07-16 PROCEDURE — 700102 HCHG RX REV CODE 250 W/ 637 OVERRIDE(OP): Performed by: EMERGENCY MEDICINE

## 2022-07-16 PROCEDURE — 93005 ELECTROCARDIOGRAM TRACING: CPT

## 2022-07-16 RX ORDER — POTASSIUM CHLORIDE 20 MEQ/1
40 TABLET, EXTENDED RELEASE ORAL ONCE
Status: COMPLETED | OUTPATIENT
Start: 2022-07-16 | End: 2022-07-16

## 2022-07-16 RX ADMIN — FENTANYL CITRATE 50 MCG: 50 INJECTION, SOLUTION INTRAMUSCULAR; INTRAVENOUS at 17:42

## 2022-07-16 RX ADMIN — POTASSIUM CHLORIDE 40 MEQ: 1500 TABLET, EXTENDED RELEASE ORAL at 17:42

## 2022-07-16 ASSESSMENT — HEART SCORE
AGE: 45-64
RISK FACTORS: >2 RISK FACTORS OR HX OF ATHEROSCLEROTIC DISEASE
ECG: NORMAL
TROPONIN: LESS THAN OR EQUAL TO NORMAL LIMIT
HISTORY: SLIGHTLY SUSPICIOUS
HEART SCORE: 3

## 2022-07-16 ASSESSMENT — FIBROSIS 4 INDEX: FIB4 SCORE: .439024390243902439

## 2022-07-16 NOTE — ED NOTES
Patient walked with a steady gait at this time, while using her walker to er [facility] room 61. Placed patient into gown, on heart monitor, on auto bp, spo2, gave warm blanket, and call light. Ready to be seen. rn aware

## 2022-07-16 NOTE — ED TRIAGE NOTES
Chief Complaint   Patient presents with   • Chest Pain     Pt bib remsa from home c/o left sided chest pain x 1 week describes as stabbing. Has hx of afib. Was given asa 324mg by ems.      Called for ekg in triage

## 2022-07-16 NOTE — ED PROVIDER NOTES
"ED Provider Note    ER PROVIDER NOTE        CHIEF COMPLAINT  Chief Complaint   Patient presents with   • Chest Pain     Pt bib remsa from home c/o left sided chest pain x 1 week describes as stabbing. Has hx of afib. Was given asa 324mg by ems.        HPI  Lorene Haro is a 46 y.o. female who presents to the emergency department complaining of chest pain.  Patient reports she initially began having some chest pain on Tuesday, reports it is a sharp pain that radiates to her back, is a \"8 of 10\" she saw her primary care, pain was always there although seemed a little better on Thursday and today seem to get worse again.  Other than to her back there is no other radiation to arms jaw or otherwise.  There is no ripping or tearing sensation it seems to be worse with twisting, lying down or pushing on it she says.  Does have some associated shortness of breath.  No diaphoresis, no nausea or vomiting.  No new leg pain or swelling, no fevers chills cough or congestion.  She is being followed by her primary care with evaluation of paroxysmal A. Fib.  She reports no palpitations, syncope or lightheadedness    REVIEW OF SYSTEMS  Pertinent positives include chest pain. Pertinent negatives include no fever. See HPI for details. All other systems reviewed and are negative.    PAST MEDICAL HISTORY   has a past medical history of A-fib (HCC), Asthma, Bipolar 2 disorder (HCC), Chickenpox, Chronic obstructive pulmonary disease (HCC), Hypertension, On home oxygen therapy, Other psychological stress, Schizophrenic disorder (HCC), and Yeast infection of the skin (4/1/2021).    SURGICAL HISTORY   has a past surgical history that includes hysterectomy laparoscopy; colectomy; cholecystectomy; and knee arthroscopy (Left).    FAMILY HISTORY  Family History   Problem Relation Age of Onset   • No Known Problems Mother    • Cancer Sister        SOCIAL HISTORY  Social History     Socioeconomic History   • Marital status: Single   Tobacco " Use   • Smoking status: Former Smoker     Years: 20.00     Types: Cigarettes     Start date: 4/4/2022   • Smokeless tobacco: Never Used   Vaping Use   • Vaping Use: Never used   Substance and Sexual Activity   • Alcohol use: No   • Drug use: No   • Sexual activity: Not Currently   Social History Narrative    From [place]     Social [facility]     Financial Resource Strain: Medium Risk   • Difficulty of Paying Living Expenses: Somewhat hard   Food Insecurity: Food Insecurity Present   • Worried About Running Out of Food in the Last Year: Often true   • Ran Out of Food in the Last Year: Often true   Transportation Needs: No Transportation Needs   • Lack of Transportation (Medical): No   • Lack of Transportation (Non-Medical): No      Social History     Substance and Sexual Activity   Drug Use No       CURRENT MEDICATIONS  Home Medications     Reviewed by Rosamaria Lowe R.N. (Registered Nurse) on 07/16/22 at 1351  Med List Status: <None>   Medication Last Dose Status   acetaminophen (TYLENOL) 325 MG Tab  Active   albuterol 108 (90 Base) MCG/ACT Aero Soln inhalation aerosol  Active   aripiprazole (ABILIFY) 30 MG tablet  Active   atorvastatin (LIPITOR) 20 MG Tab  Active   benzonatate (TESSALON) 100 MG Cap  Active   budesonide-formoterol (SYMBICORT) 160-4.5 MCG/ACT Aerosol  Active   doxycycline (MONODOX) 100 MG capsule  Active   famotidine (PEPCID) 40 MG Tab  Active   fluticasone (FLONASE) 50 MCG/ACT nasal spray  Active   furosemide (LASIX) 40 MG Tab  Active   gabapentin (NEURONTIN) 300 MG Cap  Active   ketoconazole (NIZORAL) 2 % Cream  Active   lidocaine (LIDODERM) 5 % Patch  Active   loperamide (IMODIUM) 2 MG Cap  Active   montelukast (SINGULAIR) 10 MG Tab  Active   nitroglycerin (NITROSTAT) 0.4 MG SL Tab  Active   ondansetron (ZOFRAN ODT) 4 MG TABLET DISPERSIBLE  Active   pseudoephedrine (SUDAFED) 30 MG Tab  Active   selenium sulfide 2.5% (SELSUN) 2.5 % Lotion  Active   spironolactone (ALDACTONE) 25 MG Tab   "Active   tiotropium (SPIRIVA HANDIHALER) 18 MCG Cap  Active   traZODone (DESYREL) 50 MG Tab  Active                ALLERGIES  Allergies   Allergen Reactions   • Penicillin G Rash and Itching     pt reports that she gets a rash all over her body and gets itchy   • Amoxicillin Rash and Itching     Tolerates cephalosporins, pt reports that she gets a rash all over her body and gets itchy       PHYSICAL EXAM  VITAL SIGNS: /54   Pulse 80   Temp 36.7 °C (98 °F) (Temporal)   Resp 19   Ht 1.575 m (5' 2\")   Wt (!) 131 kg (289 lb)   LMP  (LMP Unknown)   SpO2 99%   BMI 52.86 kg/m²   Pulse ox interpretation: I interpret this pulse ox as normal.    Constitutional: Alert in no apparent distress.  HENT: No signs of trauma, Bilateral external ears normal, Nose normal.   Eyes: Pupils are equal and reactive, Conjunctiva normal, Non-icteric.   Neck: Normal range of motion, No tenderness, Supple, No stridor.   Lymphatic: No lymphadenopathy noted.   Cardiovascular: Regular rate and rhythm, no murmurs.   Thorax & Lungs: Normal breath sounds, No respiratory distress, No wheezing, left lateral chest wall tenderness  Abdomen: BMI greater than 30 bowel sounds normal, Soft, No tenderness, No masses, No pulsatile masses. No peritoneal signs.  Skin: Warm, Dry, No erythema, No rash.   Back: No bony tenderness, No CVA tenderness.   Extremities: Intact distal pulses, 1+ bilateral lower extremity edema, No tenderness, No cyanosis,   Musculoskeletal: Good range of motion in all major joints. No tenderness to palpation or major deformities noted.   Neurologic: Alert , Normal motor function, Normal sensory function, No focal deficits noted.   Psychiatric: Affect normal, Judgment normal, Mood normal.     DIAGNOSTIC STUDIES / PROCEDURES    EKG  Results for orders placed or performed during the hospital encounter of 07/16/22   CBC with Differential   Result Value Ref Range    WBC 12.0 (H) 4.8 - 10.8 K/uL    RBC 4.52 4.20 - 5.40 M/uL    " Hemoglobin 12.0 12.0 - 16.0 g/dL    Hematocrit 39.4 37.0 - 47.0 %    MCV 87.2 81.4 - 97.8 fL    MCH 26.5 (L) 27.0 - 33.0 pg    MCHC 30.5 (L) 33.6 - 35.0 g/dL    RDW 55.7 (H) 35.9 - 50.0 fL    Platelet Count 276 164 - 446 K/uL    MPV 10.6 9.0 - 12.9 fL    Neutrophils-Polys 76.20 (H) 44.00 - 72.00 %    Lymphocytes 15.20 (L) 22.00 - 41.00 %    Monocytes 6.40 0.00 - 13.40 %    Eosinophils 1.30 0.00 - 6.90 %    Basophils 0.30 0.00 - 1.80 %    Immature Granulocytes 0.60 0.00 - 0.90 %    Nucleated RBC 0.00 /100 WBC    Neutrophils (Absolute) 9.11 (H) 2.00 - 7.15 K/uL    Lymphs (Absolute) 1.82 1.00 - 4.80 K/uL    Monos (Absolute) 0.77 0.00 - 0.85 K/uL    Eos (Absolute) 0.15 0.00 - 0.51 K/uL    Baso (Absolute) 0.03 0.00 - 0.12 K/uL    Immature Granulocytes (abs) 0.07 0.00 - 0.11 K/uL    NRBC (Absolute) 0.00 K/uL   Complete Metabolic Panel (CMP)   Result Value Ref Range    Sodium 142 135 - 145 mmol/L    Potassium 3.3 (L) 3.6 - 5.5 mmol/L    Chloride 104 96 - 112 mmol/L    Co2 28 20 - 33 mmol/L    Anion Gap 10.0 7.0 - 16.0    Glucose 69 65 - 99 mg/dL    Bun 7 (L) 8 - 22 mg/dL    Creatinine 0.93 0.50 - 1.40 mg/dL    Calcium 9.0 8.5 - 10.5 mg/dL    AST(SGOT) 11 (L) 12 - 45 U/L    ALT(SGPT) 13 2 - 50 U/L    Alkaline Phosphatase 118 (H) 30 - 99 U/L    Total Bilirubin 0.7 0.1 - 1.5 mg/dL    Albumin 4.1 3.2 - 4.9 g/dL    Total Protein 7.4 6.0 - 8.2 g/dL    Globulin 3.3 1.9 - 3.5 g/dL    A-G Ratio 1.2 g/dL   Troponin   Result Value Ref Range    Troponin T 35 (H) 6 - 19 ng/L   ESTIMATED GFR   Result Value Ref Range    GFR (CKD-EPI) 75 >60 mL/min/1.73 m 2   D-DIMER   Result Value Ref Range    D-Dimer Screen 0.29 0.00 - 0.50 ug/mL (FEU)   Prothrombin Time   Result Value Ref Range    PT 13.8 12.0 - 14.6 sec    INR 1.08 0.87 - 1.13   TROPONIN   Result Value Ref Range    Troponin T 30 (H) 6 - 19 ng/L   EKG   Result Value Ref Range    Report       [facility]    Test Date:  2022-07-16  Pt Name:    ADELINA  YANA                Department: ER  MRN:        [id number]                      Room:       [facility]  Gender:     Female                       Technician: [id number]  :        1975                   Requested By:ER TRIAGE PROTOCOL  Order #:    [id number]                    Reading MD: AMANDA ZAMUDIO MD    Measurements  Intervals                                Axis  Rate:       72                           P:          56  ND:         134                          QRS:        58  QRSD:       101                          T:          29  QT:         361  QTc:        397    Interpretive Statements  Sinus rhythm  Low voltage, precordial leads  Compared to ECG 2022 23:42:10  Low QRS voltage now present  ST (T wave) deviation no longer present  Atrial abnormality no longer present  Electronically Signed On 2022 16:37:13 PDT by AMANDA ZAMUDIO MD           RADIOLOGY  DX-CHEST-PORTABLE (1 VIEW)   Final Result      Ill-defined bibasilar opacities, favoring atelectasis over consolidations. Pneumonia is unlikely, but can be considered in the appropriate clinical settings.        The radiologist's interpretation of all radiological studies have been reviewed and images independently viewed by me.    COURSE & MEDICAL DECISION MAKING  Nursing notes, VS, PMSFHx reviewed in chart.    4:21 PM Patient seen and examined at bedside. Patient will be treated with fentanyl, she received aspirin prehospital. Ordered for CBC, CMP, troponin, D-dimer, x-ray to evaluate her symptoms.       7:17 PM  Patient is reevaluated, pain is resolved, discussed results and plan for discharge to which the patient is agreeable      Decision Making:  This is a 46 y.o. female presented with chest pain.  At this point does not appear to have any acute or emergent process.  Her troponin was 35 and on repeat 30, in review of her records this is baseline for her.  There are no findings of ischemia on her ECG or arrhythmia.  Given this was the nature of  symptoms seems unlikely to be ACS. HEART3  D-dimer was negative and I think pulmonary embolism is unlikely.  There is no findings of pneumonia on her exam or x-ray and she has no infectious symptoms.  Given the nature of her pain and improvement seems unlikely to be dissection.  She was mildly hypokalemic which was repleted.  Patient is being evaluated through primary care for atrial fibrillation is planned for loop recorder on the 19th at this point there is no evidence of atrial fibrillation or other arrhythmia    The patient will return for new or worsening symptoms and is stable at the time of discharge.    The patient is referred to a primary physician for blood pressure management, diabetic screening, and for all other preventative health concerns.      DISPOSITION:  Patient will be discharged home in stable condition.    FOLLOW UP:  Heriberto Sharp M.D.  [address] [place] [address] [id number] [phone]    Schedule an appointment as soon as possible for a visit         OUTPATIENT MEDICATIONS:  New Prescriptions    No medications on file         FINAL IMPRESSION  1. Chest pain, unspecified type       The note accurately reflects work and decisions made by me.  Yanick Posada M.D.  7/16/2022  7:21 PM

## 2022-07-17 NOTE — ED NOTES
Repeat troponin drawn and sent to lab. Patient placed on 3L NC. She states that she is normally on 3L at home and was dropping her 02 sat to low 80s, on 3L she is back to 100% O2 sat

## 2022-07-17 NOTE — DISCHARGE INSTRUCTIONS
Your tests today show no signs of heart attack or other dangerous condition.  However sometimes these can develop even with normal tests.  Please follow-up as directed below, seek more immediate medical attention for any new chest pain, difficulty breathing, passing out or any other concerns.

## 2022-07-18 ENCOUNTER — APPOINTMENT (OUTPATIENT)
Dept: SLEEP MEDICINE | Facility: MEDICAL CENTER | Age: 47
End: 2022-07-18
Payer: MEDICAID

## 2022-07-19 ENCOUNTER — APPOINTMENT (OUTPATIENT)
Dept: CARDIOLOGY | Facility: MEDICAL CENTER | Age: 47
End: 2022-07-19
Attending: INTERNAL MEDICINE
Payer: MEDICAID

## 2022-07-19 ENCOUNTER — HOSPITAL ENCOUNTER (OUTPATIENT)
Facility: MEDICAL CENTER | Age: 47
End: 2022-07-19
Attending: INTERNAL MEDICINE | Admitting: INTERNAL MEDICINE
Payer: MEDICAID

## 2022-07-19 VITALS
OXYGEN SATURATION: 92 % | HEART RATE: 71 BPM | BODY MASS INDEX: 51.44 KG/M2 | WEIGHT: 279.54 LBS | HEIGHT: 62 IN | DIASTOLIC BLOOD PRESSURE: 63 MMHG | RESPIRATION RATE: 16 BRPM | SYSTOLIC BLOOD PRESSURE: 138 MMHG | TEMPERATURE: 97.4 F

## 2022-07-19 DIAGNOSIS — I48.0 PAROXYSMAL ATRIAL FIBRILLATION (HCC): ICD-10-CM

## 2022-07-19 PROCEDURE — 160046 HCHG PACU - 1ST 60 MINS PHASE II

## 2022-07-19 PROCEDURE — 160002 HCHG RECOVERY MINUTES (STAT)

## 2022-07-19 PROCEDURE — 700101 HCHG RX REV CODE 250

## 2022-07-19 PROCEDURE — 33285 INSJ SUBQ CAR RHYTHM MNTR: CPT | Performed by: INTERNAL MEDICINE

## 2022-07-19 PROCEDURE — 305387 CL-IMPLANTABLE LOOP RECORDER

## 2022-07-19 RX ORDER — LIDOCAINE HYDROCHLORIDE AND EPINEPHRINE BITARTRATE 20; .01 MG/ML; MG/ML
INJECTION, SOLUTION SUBCUTANEOUS
Status: COMPLETED
Start: 2022-07-19 | End: 2022-07-19

## 2022-07-19 RX ADMIN — LIDOCAINE HYDROCHLORIDE AND EPINEPHRINE: 20; 10 INJECTION, SOLUTION INFILTRATION; PERINEURAL at 11:45

## 2022-07-19 ASSESSMENT — FIBROSIS 4 INDEX: FIB4 SCORE: 0.53

## 2022-07-19 NOTE — OP REPORT
PROCEDURE PERFORMED: Implantable Loop Recorder    DATE OF SERVICE: 7/19/2022    : Nader Barraza MD    ASSISTANT: None    ANESTHESIA: Local    EBL: <5 cc    SPECIMENS: None    STATEMENT OF MEDICAL NECESSITY:  Palpitations    DESCRIPTION OF PROCEDURE:  After informed written consent, the patient was brought to the cath lab pre/post procedure area. The patient was prepped and draped in the usual sterile fashion. The procedure was performed with local anesthetic. Using the supplied incision tool, a <1 cm incision was made in the skin about 2 cm leftward and lateral to the sternal border. Using the supplied insertion tool, a tunnel was made in the subcutaneous tissue at a 45 degree angle to the sternum and the device was inserted with the supplied plunger. Manual compression was used until hemostasis achieved. A single 4-0 vicryl suture was used to appose the skin. Dermabond was applied to the incision site. Sterile dressing was applied.    IMPLANTED DEVICE INFORMATION:  Model: Medtronic LNQ22  Serial number:  [id number]    IMPRESSIONS:  1. Successful implantable loop recorder implantation    RECOMMENDATIONS:  1. Routine follow-up and device interrogation

## 2022-07-19 NOTE — PROGRESS NOTES
EP Note    Pt referred for ILR implantation.  Pt had previous ECG in incorrectly read as atrial fibrillation. No prior documentation of atrial fibrillation. Notes palpitations and referred for ILR implant for evaluation.    Nader Barraza MD  Cardiac Electrophysiology

## 2022-07-19 NOTE — DISCHARGE INSTRUCTIONS
If any questions arise, call your provider.  [facility] [phone] If your provider is not available, please feel free to call the [facility] at [phone].    MEDICATIONS: Resume taking daily medication.  Take prescribed pain medication with food.  If no medication is prescribed, you may take non-aspirin pain medication if needed.  PAIN MEDICATION CAN BE VERY CONSTIPATING.  Take a stool softener or laxative such as senokot, pericolace, or milk of magnesia if needed.    Last pain medication given at N/A    Diet    Resume your normal diet as tolerated.  A diet low in cholesterol, fat, and sodium is recommended for good health.

## 2022-07-19 NOTE — OR NURSING
Arrived to Phase II, denies pain and nausea. VSS on RA, dressing gauze and transparent film CDI, area soft. Dressed. Ready for home. Mom to bedside, discharge instructions reviewed with patient and mom. Discharged via wheelchair with all personal belongings.

## 2022-07-28 ENCOUNTER — NON-PROVIDER VISIT (OUTPATIENT)
Dept: CARDIOLOGY | Facility: MEDICAL CENTER | Age: 47
End: 2022-07-28
Payer: MEDICAID

## 2022-07-28 ENCOUNTER — RESEARCH ENCOUNTER (OUTPATIENT)
Dept: CARDIOLOGY | Facility: MEDICAL CENTER | Age: 47
End: 2022-07-28

## 2022-07-28 DIAGNOSIS — Z00.6 RESEARCH STUDY PATIENT: ICD-10-CM

## 2022-07-28 DIAGNOSIS — Z95.818 STATUS POST PLACEMENT OF IMPLANTABLE LOOP RECORDER: ICD-10-CM

## 2022-07-28 DIAGNOSIS — R00.2 PALPITATIONS: ICD-10-CM

## 2022-07-28 PROCEDURE — 93291 INTERROG DEV EVAL SCRMS IP: CPT | Performed by: INTERNAL MEDICINE

## 2022-07-28 NOTE — NON-PROVIDER
Wound site is healing well. Pt advised to watch for increased redness, swelling, oozing, or fever. Verbalized understanding.

## 2022-07-28 NOTE — RESEARCH NOTE
[employer] [place] [employer] enrollment complete. Saliva sample collected onsite. DELGADO Identified consented and enrolled.

## 2022-07-29 ENCOUNTER — HOSPITAL ENCOUNTER (OUTPATIENT)
Facility: MEDICAL CENTER | Age: 47
End: 2022-07-29
Attending: PATHOLOGY
Payer: COMMERCIAL

## 2022-07-29 DIAGNOSIS — Z00.6 RESEARCH STUDY PATIENT: ICD-10-CM

## 2022-08-03 LAB
ELF SCORE: 10.3
RELATIVE RISK: ABNORMAL
RISK GROUP: ABNORMAL
RISK: 23.6 %

## 2022-08-05 ENCOUNTER — OFFICE VISIT (OUTPATIENT)
Dept: SLEEP MEDICINE | Facility: MEDICAL CENTER | Age: 47
End: 2022-08-05
Payer: MEDICAID

## 2022-08-05 VITALS
RESPIRATION RATE: 20 BRPM | HEART RATE: 77 BPM | OXYGEN SATURATION: 96 % | WEIGHT: 293 LBS | HEIGHT: 62 IN | BODY MASS INDEX: 53.92 KG/M2

## 2022-08-05 DIAGNOSIS — E66.2 OBESITY HYPOVENTILATION SYNDROME (HCC): ICD-10-CM

## 2022-08-05 DIAGNOSIS — J44.9 OSA AND COPD OVERLAP SYNDROME (HCC): ICD-10-CM

## 2022-08-05 DIAGNOSIS — G47.33 OSA AND COPD OVERLAP SYNDROME (HCC): ICD-10-CM

## 2022-08-05 PROCEDURE — 99214 OFFICE O/P EST MOD 30 MIN: CPT | Performed by: NURSE PRACTITIONER

## 2022-08-05 ASSESSMENT — FIBROSIS 4 INDEX: FIB4 SCORE: 0.53

## 2022-08-05 NOTE — PROGRESS NOTES
Chief Complaint   Patient presents with    Apnea     SS results       HPI:  Lorene Haro is a 46 y.o. year old female here today for follow-up on KATHIA with respiratory failure.  Patient also has a history of obstructive lung disease, BPD, atrial fibrillation, schizophrenic disorder, history of COVID-pneumonia in October 2021, hypertension and recurrent ED visits.  Former smoker with a 20-pack-year history..     Presents today to review sleep study.  Last seen in sleep clinic by Jessica Whitfield PA-C.  Patient was attempting to get a new IVAPS machine, but her sleep study was too old.  She conducted a sleep titration study on 7/3/2022.  Patient was previously on a [employer] AVAPS machine.  Previous settings on that device was EPAP 11, IPAP min max 15 to 24 cm, tidal volume 450, breath rate 14 with a timed inspiration of 1 and a rise time setting of 1.    Patient does complain of excessive daytime sleepiness due to poor sleep quality.  She falls asleep unintentionally.  She also has atrial fibrillation that is poorly controlled.  When sleeping, she endorses severe snoring and episodes of waking up gasping.  She will sleep between 7 PM to 4:30 AM and then wake up for a bit and then sleep between 8 AM to 11 AM.  She notes poor energy levels throughout the day.    Titration study (7/3/2022):  Known severe obstructive sleep apnea with significant hypoxia previously on AVAPS. Difficult to control sleep apnea however does have improvement from baseline  Did have improvement with AVAPS therapy. Nocturnal hypoxia likely due to sleep apnea      Recommendations:  I recommend AVAPS EPAP 11 pressure support 4-14, backup rate 14 breaths/min, TI 1, rise time 1, tidal volume 450 mL. Patient used a small AirFit F 30 i during study.      ROS: As per HPI and otherwise negative if not stated.    Past Medical History:   Diagnosis Date    A-fib (HCC)     Asthma     Bipolar 2 disorder (HCC)     Chickenpox     Chronic obstructive pulmonary  "disease (HCC)     Hypertension     On home oxygen therapy     Other psychological stress     Schizophrenic disorder (HCC)     Yeast infection of the skin 4/1/2021       Past Surgical History:   Procedure Laterality Date    CHOLECYSTECTOMY      COLECTOMY      HYSTERECTOMY LAPAROSCOPY      KNEE ARTHROSCOPY Left        Family History   Problem Relation Age of Onset    No Known Problems Mother     Cancer Sister        Allergies as of 08/05/2022 - Reviewed 08/05/2022   Allergen Reaction Noted    Penicillin g Rash and Itching 10/20/2020    Amoxicillin Rash and Itching 02/26/2022        Vitals:  Pulse 77   Resp 20   Ht 1.575 m (5' 2\")   Wt (!) 135 kg (296 lb 14.4 oz)   SpO2 96%     Current medications as of today   Current Outpatient Medications   Medication Sig Dispense Refill    benzonatate (TESSALON) 100 MG Cap Take 1 Capsule by mouth 3 times a day as needed for Cough. 60 Capsule 0    pseudoephedrine (SUDAFED) 30 MG Tab Take 1 Tablet by mouth every four hours as needed for Congestion. 30 Tablet 0    ondansetron (ZOFRAN ODT) 4 MG TABLET DISPERSIBLE Take 1 Tablet by mouth every 8 hours as needed for Nausea. 10 Tablet 0    loperamide (IMODIUM) 2 MG Cap Take 1 Capsule by mouth 4 times a day as needed for Diarrhea. 30 Capsule 0    furosemide (LASIX) 40 MG Tab Take 1 Tablet by mouth every day. 90 Tablet 3    ketoconazole (NIZORAL) 2 % Cream Apply 1 g topically every day. 60 g 0    selenium sulfide 2.5% (SELSUN) 2.5 % Lotion Apply to scalp once daily 118 mL 3    spironolactone (ALDACTONE) 25 MG Tab Take 1 Tablet by mouth every day. 30 Tablet 1    lidocaine (LIDODERM) 5 % Patch Place 1 Patch on the skin every 24 hours. 10 Patch 0    fluticasone (FLONASE) 50 MCG/ACT nasal spray ADMOINISTER 1 TO 2 SPRAYS INTO AFFECTED NOSTRIL(S) TWICE DAILY 16 g 3    montelukast (SINGULAIR) 10 MG Tab TAKE 1 TABLET BY MOUTH EVERY NIGHT AT BEDTIME 90 Tablet 3    budesonide-formoterol (SYMBICORT) 160-4.5 MCG/ACT Aerosol Inhale 2 Puffs 2 times a " day. 3 Each 3    tiotropium (SPIRIVA HANDIHALER) 18 MCG Cap Place 1 Capsule into inhaler and inhale every day. 30 Capsule 3    acetaminophen (TYLENOL) 325 MG Tab Take 325 mg by mouth every 8 hours as needed (Generalized pain). Prescription is for 4 tabs every 8 hours prn, patient states she takes 2 tabls every 4 hours prn      atorvastatin (LIPITOR) 20 MG Tab Take 20 mg by mouth at bedtime.      famotidine (PEPCID) 40 MG Tab Take 40 mg by mouth every day. Indications: Heartburn      nitroglycerin (NITROSTAT) 0.4 MG SL Tab Place 1 tablet under the tongue as needed for Chest Pain. 25 tablet 0    albuterol 108 (90 Base) MCG/ACT Aero Soln inhalation aerosol Inhale 2 Puffs every 6 hours as needed for Shortness of Breath. 8.5 g 0    aripiprazole (ABILIFY) 30 MG tablet Take 30 mg by mouth every morning.      cephALEXin (KEFLEX) 500 MG Cap Take 1 Capsule by mouth 4 times a day for 7 days. 28 Capsule 0    doxycycline (MONODOX) 100 MG capsule Take 1 Capsule by mouth 2 times a day. (Patient not taking: Reported on 8/5/2022) 20 Capsule 0     No current facility-administered medications for this visit.         Physical Exam:   Gen:           Alert and oriented, No apparent distress. Mood and affect appropriate, normal interaction with examiner.  Eyes:          PERRL, EOM intact, sclere white, conjunctive moist.  Ears:          Not examined.   Hearing:     Grossly intact.  Nose:          Normal, no lesions or deformities.  Dentition:    Good dentition.  Oropharynx:   Tongue normal, posterior pharynx without erythema or exudate  Neck:        Supple, trachea midline, no masses.  Respiratory Effort: No intercostal retractions or use of accessory muscles.   Lung Auscultation:      Clear to auscultation bilaterally; no rales, rhonchi or wheezing.  CV:            Regular rate and rhythm. No murmurs, rubs or gallops.  Abd:           Not examined.   Lymphadenopathy: Not examined.  Gait and Station: Normal.  Digits and Nails: No clubbing,  cyanosis, petechiae, or nodes.   Cranial Nerves: II-XII grossly intact.  Skin:        No rashes, lesions or ulcers noted.               Ext:           No cyanosis or edema.      Assessment:  1. KATHIA and COPD overlap syndrome (HCC)  DME Other      2. Obesity hypoventilation syndrome (HCC)  DME Other              Plan:  Patient will require set up on a [employer] IVAPS machine with the following settings: IVAPS EPAP 11 pressure support 4-14, backup rate 14 breaths/min, TI 1, rise time 1, tidal volume 450 mL. Patient used a small AirFit F 30 I.  Commended patient follow-up after being set up on machine for first compliance check.  Order will be sent to [facility].    Please note that this dictation was created using voice recognition software. I have made every reasonable attempt to correct obvious errors, but it is possible there are errors of grammar and possibly content that I did not discover before finalizing the note.

## 2022-08-06 ENCOUNTER — APPOINTMENT (OUTPATIENT)
Dept: RADIOLOGY | Facility: MEDICAL CENTER | Age: 47
End: 2022-08-06
Attending: EMERGENCY MEDICINE
Payer: MEDICAID

## 2022-08-06 ENCOUNTER — HOSPITAL ENCOUNTER (EMERGENCY)
Facility: MEDICAL CENTER | Age: 47
End: 2022-08-06
Attending: EMERGENCY MEDICINE
Payer: MEDICAID

## 2022-08-06 VITALS
DIASTOLIC BLOOD PRESSURE: 69 MMHG | HEART RATE: 78 BPM | RESPIRATION RATE: 20 BRPM | TEMPERATURE: 97.4 F | WEIGHT: 286 LBS | OXYGEN SATURATION: 95 % | HEIGHT: 62 IN | BODY MASS INDEX: 52.63 KG/M2 | SYSTOLIC BLOOD PRESSURE: 138 MMHG

## 2022-08-06 DIAGNOSIS — L03.116 CELLULITIS OF LEFT LOWER EXTREMITY: ICD-10-CM

## 2022-08-06 LAB
ALBUMIN SERPL BCP-MCNC: 3.4 G/DL (ref 3.2–4.9)
ALBUMIN/GLOB SERPL: 1.1 G/DL
ALP SERPL-CCNC: 108 U/L (ref 30–99)
ALT SERPL-CCNC: 11 U/L (ref 2–50)
ANION GAP SERPL CALC-SCNC: 9 MMOL/L (ref 7–16)
AST SERPL-CCNC: 10 U/L (ref 12–45)
BASOPHILS # BLD AUTO: 0.1 % (ref 0–1.8)
BASOPHILS # BLD: 0.01 K/UL (ref 0–0.12)
BILIRUB SERPL-MCNC: 0.6 MG/DL (ref 0.1–1.5)
BUN SERPL-MCNC: 9 MG/DL (ref 8–22)
CALCIUM SERPL-MCNC: 8.7 MG/DL (ref 8.5–10.5)
CHLORIDE SERPL-SCNC: 105 MMOL/L (ref 96–112)
CO2 SERPL-SCNC: 25 MMOL/L (ref 20–33)
CREAT SERPL-MCNC: 0.88 MG/DL (ref 0.5–1.4)
EKG IMPRESSION: NORMAL
EOSINOPHIL # BLD AUTO: 0.08 K/UL (ref 0–0.51)
EOSINOPHIL NFR BLD: 0.9 % (ref 0–6.9)
ERYTHROCYTE [DISTWIDTH] IN BLOOD BY AUTOMATED COUNT: 53.5 FL (ref 35.9–50)
GFR SERPLBLD CREATININE-BSD FMLA CKD-EPI: 81 ML/MIN/1.73 M 2
GLOBULIN SER CALC-MCNC: 3 G/DL (ref 1.9–3.5)
GLUCOSE SERPL-MCNC: 93 MG/DL (ref 65–99)
HCT VFR BLD AUTO: 35.3 % (ref 37–47)
HGB BLD-MCNC: 10.7 G/DL (ref 12–16)
IMM GRANULOCYTES # BLD AUTO: 0.03 K/UL (ref 0–0.11)
IMM GRANULOCYTES NFR BLD AUTO: 0.3 % (ref 0–0.9)
LACTATE SERPL-SCNC: 1.2 MMOL/L (ref 0.5–2)
LYMPHOCYTES # BLD AUTO: 1.54 K/UL (ref 1–4.8)
LYMPHOCYTES NFR BLD: 17.2 % (ref 22–41)
MCH RBC QN AUTO: 27.1 PG (ref 27–33)
MCHC RBC AUTO-ENTMCNC: 30.3 G/DL (ref 33.6–35)
MCV RBC AUTO: 89.4 FL (ref 81.4–97.8)
MONOCYTES # BLD AUTO: 0.5 K/UL (ref 0–0.85)
MONOCYTES NFR BLD AUTO: 5.6 % (ref 0–13.4)
NEUTROPHILS # BLD AUTO: 6.77 K/UL (ref 2–7.15)
NEUTROPHILS NFR BLD: 75.9 % (ref 44–72)
NRBC # BLD AUTO: 0 K/UL
NRBC BLD-RTO: 0 /100 WBC
NT-PROBNP SERPL IA-MCNC: 393 PG/ML (ref 0–125)
PLATELET # BLD AUTO: 226 K/UL (ref 164–446)
PMV BLD AUTO: 11 FL (ref 9–12.9)
POTASSIUM SERPL-SCNC: 3.5 MMOL/L (ref 3.6–5.5)
PROT SERPL-MCNC: 6.4 G/DL (ref 6–8.2)
RBC # BLD AUTO: 3.95 M/UL (ref 4.2–5.4)
SODIUM SERPL-SCNC: 139 MMOL/L (ref 135–145)
TROPONIN T SERPL-MCNC: 26 NG/L (ref 6–19)
WBC # BLD AUTO: 8.9 K/UL (ref 4.8–10.8)

## 2022-08-06 PROCEDURE — 83880 ASSAY OF NATRIURETIC PEPTIDE: CPT

## 2022-08-06 PROCEDURE — 99285 EMERGENCY DEPT VISIT HI MDM: CPT

## 2022-08-06 PROCEDURE — 87040 BLOOD CULTURE FOR BACTERIA: CPT

## 2022-08-06 PROCEDURE — 83605 ASSAY OF LACTIC ACID: CPT

## 2022-08-06 PROCEDURE — 80053 COMPREHEN METABOLIC PANEL: CPT

## 2022-08-06 PROCEDURE — 84484 ASSAY OF TROPONIN QUANT: CPT

## 2022-08-06 PROCEDURE — 85025 COMPLETE CBC W/AUTO DIFF WBC: CPT

## 2022-08-06 PROCEDURE — 700102 HCHG RX REV CODE 250 W/ 637 OVERRIDE(OP): Performed by: EMERGENCY MEDICINE

## 2022-08-06 PROCEDURE — 93005 ELECTROCARDIOGRAM TRACING: CPT

## 2022-08-06 PROCEDURE — A9270 NON-COVERED ITEM OR SERVICE: HCPCS | Performed by: EMERGENCY MEDICINE

## 2022-08-06 PROCEDURE — 36415 COLL VENOUS BLD VENIPUNCTURE: CPT

## 2022-08-06 PROCEDURE — 93005 ELECTROCARDIOGRAM TRACING: CPT | Performed by: EMERGENCY MEDICINE

## 2022-08-06 PROCEDURE — 71045 X-RAY EXAM CHEST 1 VIEW: CPT

## 2022-08-06 RX ORDER — CEPHALEXIN 500 MG/1
500 CAPSULE ORAL ONCE
Status: COMPLETED | OUTPATIENT
Start: 2022-08-06 | End: 2022-08-06

## 2022-08-06 RX ORDER — CEPHALEXIN 500 MG/1
500 CAPSULE ORAL 4 TIMES DAILY
Qty: 28 CAPSULE | Refills: 0 | Status: SHIPPED | OUTPATIENT
Start: 2022-08-06 | End: 2022-08-13

## 2022-08-06 RX ADMIN — CEPHALEXIN 500 MG: 500 CAPSULE ORAL at 09:54

## 2022-08-06 NOTE — ED TRIAGE NOTES
Chief Complaint   Patient presents with   • Leg Swelling     B/l lower leg swelling with redness, hard to walk due to pain       Pt reports chronic episodes of dizziness, SOB, weakness and tingling, states she is having an episode of this since Wednesday, Pt had sleep study 2 months ago.   And was at PCP yesterday to see if she is able to get a Cpap machine.

## 2022-08-06 NOTE — ED NOTES
Assumed care from night shift RN. Ultrasound at bedside. Night shift RN sent labs and one set of cultures. Second set of cultures requested from .

## 2022-08-06 NOTE — ED PROVIDER NOTES
"ED Provider Note    CHIEF COMPLAINT  Chief Complaint   Patient presents with   • Leg Swelling     B/l lower leg swelling with redness, hard to walk due to pain       HPI  Lorene Haro is a 46 y.o. female who presents to the emergency department with bilateral leg swelling.  Gradually worsening over the last several weeks and then turned red.  She has throbbing discomfort that is worse with ambulation.  She is not sure if she has had a fever.  She denies chest pain or shortness of breath currently but has been having episodic episodes of dizziness and breathlessness.  Dizziness is characterized as feeling lightheaded.  Denies pleuritic pain, cough, congestion runny nose sore throat.  No abdominal pain vomiting diarrhea    She has checked in under a different medical record.  Previous medical record #[id number]    REVIEW OF SYSTEMS  As per HPI, otherwise a 10 point review of systems is negative    PAST MEDICAL HISTORY   has a past medical history of A-fib ([facility]), Asthma, Bipolar 2 disorder (HCC), Chickenpox, Chronic obstructive pulmonary disease (HCC), Hypertension, On home oxygen therapy, Other psychological stress, Schizophrenic disorder ([facility]), and Yeast infection of the skin (4/1/2021).    SOCIAL HISTORY  Social History     Tobacco Use   • Smoking status: Former Smoker   • Smokeless tobacco: Never Used   Substance Use Topics   • Alcohol use: Never   • Drug use: Never       SURGICAL HISTORY  History reviewed. No pertinent surgical history.    CURRENT MEDICATIONS  Home Medications    **Home medications have not yet been reviewed for this encounter**         ALLERGIES  Allergies   Allergen Reactions   • Amoxicillin Itching       PHYSICAL EXAM  VITAL SIGNS: BP (!) 143/90   Pulse 87   Temp 36.5 °C (97.7 °F) (Temporal)   Resp (!) 26   Ht 1.575 m (5' 2\")   Wt (!) 130 kg (286 lb)   SpO2 100% Comment: 3L  BMI 52.31 kg/m²    Constitutional: Awake and alert.  Poorly kept.  Medically obese.  HENT: Normal inspection  Eyes: " Normal inspection  Neck: Grossly normal range of motion.  Cardiovascular: Normal heart rate, Normal rhythm.  Symmetric peripheral pulses.   Thorax & Lungs: No respiratory distress, No wheezing, No rales, No rhonchi, No chest tenderness.   Abdomen: Bowel sounds normal, soft, non-distended, nontender, no mass  Skin: Redness and warmth of bilateral lower extremities.  There does appear to be underlying chronic venous stasis changes, but redness is greater than I would expect.  Back: No tenderness, No CVA tenderness.   Extremities: No clubbing, cyanosis, edema, no Homans or cords.  Neurologic: Grossly normal   Psychiatric: Flat affect    RADIOLOGY/PROCEDURES  US-EXTREMITY VENOUS LOWER BILAT   Final Result      DX-CHEST-PORTABLE (1 VIEW)   Final Result         1. No pulmonary infiltrates or consolidations are noted.      2. Cardiomegaly.           Imaging is interpreted by radiologist    Labs:  Results for orders placed or performed during the hospital encounter of 08/06/22   LACTIC ACID   Result Value Ref Range    Lactic Acid 1.2 0.5 - 2.0 mmol/L   CBC WITH DIFFERENTIAL   Result Value Ref Range    WBC 8.9 4.8 - 10.8 K/uL    RBC 3.95 (L) 4.20 - 5.40 M/uL    Hemoglobin 10.7 (L) 12.0 - 16.0 g/dL    Hematocrit 35.3 (L) 37.0 - 47.0 %    MCV 89.4 81.4 - 97.8 fL    MCH 27.1 27.0 - 33.0 pg    MCHC 30.3 (L) 33.6 - 35.0 g/dL    RDW 53.5 (H) 35.9 - 50.0 fL    Platelet Count 226 164 - 446 K/uL    MPV 11.0 9.0 - 12.9 fL    Neutrophils-Polys 75.90 (H) 44.00 - 72.00 %    Lymphocytes 17.20 (L) 22.00 - 41.00 %    Monocytes 5.60 0.00 - 13.40 %    Eosinophils 0.90 0.00 - 6.90 %    Basophils 0.10 0.00 - 1.80 %    Immature Granulocytes 0.30 0.00 - 0.90 %    Nucleated RBC 0.00 /100 WBC    Neutrophils (Absolute) 6.77 2.00 - 7.15 K/uL    Lymphs (Absolute) 1.54 1.00 - 4.80 K/uL    Monos (Absolute) 0.50 0.00 - 0.85 K/uL    Eos (Absolute) 0.08 0.00 - 0.51 K/uL    Baso (Absolute) 0.01 0.00 - 0.12 K/uL    Immature Granulocytes (abs) 0.03 0.00 -  0.11 K/uL    NRBC (Absolute) 0.00 K/uL   COMP METABOLIC PANEL   Result Value Ref Range    Sodium 139 135 - 145 mmol/L    Potassium 3.5 (L) 3.6 - 5.5 mmol/L    Chloride 105 96 - 112 mmol/L    Co2 25 20 - 33 mmol/L    Anion Gap 9.0 7.0 - 16.0    Glucose 93 65 - 99 mg/dL    Bun 9 8 - 22 mg/dL    Creatinine 0.88 0.50 - 1.40 mg/dL    Calcium 8.7 8.5 - 10.5 mg/dL    AST(SGOT) 10 (L) 12 - 45 U/L    ALT(SGPT) 11 2 - 50 U/L    Alkaline Phosphatase 108 (H) 30 - 99 U/L    Total Bilirubin 0.6 0.1 - 1.5 mg/dL    Albumin 3.4 3.2 - 4.9 g/dL    Total Protein 6.4 6.0 - 8.2 g/dL    Globulin 3.0 1.9 - 3.5 g/dL    A-G Ratio 1.1 g/dL   Troponin   Result Value Ref Range    Troponin T 26 (H) 6 - 19 ng/L   proBrain Natriuretic Peptide, NT   Result Value Ref Range    NT-proBNP 393 (H) 0 - 125 pg/mL   ESTIMATED GFR   Result Value Ref Range    GFR (CKD-EPI) 81 >60 mL/min/1.73 m 2   EKG   Result Value Ref Range    Report       [facility] Emergency Dept.    Test Date:  2022  Pt Name:    ADELINA MILLAN                Department: ER  MRN:        [id number]                      Room:  Gender:     Female                       Technician: [id number]  :        1975                   Requested By:ER TRIAGE PROTOCOL  Order #:    [id number]                    Reading MD: BECKY TIDWELL MD    Measurements  Intervals                                Axis  Rate:       80                           P:          69  CT:         136                          QRS:        82  QRSD:       102                          T:          12  QT:         376  QTc:        434    Interpretive Statements  SINUS RHYTHM  RSR' IN V1 OR V2, PROBABLY NORMAL VARIANT  No previous ECG available for comparison  Electronically Signed On 2022 7:24:55 PDT by BECKY TIDWELL MD             COURSE & MEDICAL DECISION MAKING  Patient presents emergency department with leg swelling.  She does appear to have mild cellulitis worse on the left than on the right in the  setting of chronic venous stasis changes.  I suspect cellulitis.  Will obtain work-up based on visional symptoms she described.    Laboratory did add data as noted.  No leukocytosis.  Mild left shift.  Normal lactic acid.  CMP with minimal abnormality.  Troponin is elevated at a similar value to previous.  Minimally elevated BNP without evidence of heart failure on x-ray.  She does not have DVT.    Patient was noted to have obstructive sleep apnea while here.  She does desaturate.  This is not new for her.  She has oxygen at home.  There is plan for her to get another sleep study and a new CPAP machine.    Patient appears appropriate for outpatient management.  I have given a prescription for Keflex.  Advised to elevate and compression of her lower extremities.  She is to return to the ER for fever, worsening redness, difficulty breathing or concern      FINAL IMPRESSION  1.  Left lower extremity cellulitis  2.  Pedal edema   3.  obstructive sleep apnea        This dictation was created using voice recognition software. The accuracy of the dictation is limited to the abilities of the software.  The nursing notes were reviewed and certain aspects of this information were incorporated into this note.      Electronically signed by: David Alejandra M.D., 8/6/2022 7:21 AM

## 2022-08-06 NOTE — ED NOTES
Reviewed discharge instructions with patient. Verbalized understanding, educated on Rx. Denies further questions at this time. Calling MTM for ride.

## 2022-08-11 LAB
BACTERIA BLD CULT: NORMAL
SIGNIFICANT IND 70042: NORMAL
SITE SITE: NORMAL
SOURCE SOURCE: NORMAL

## 2022-08-15 NOTE — ASSESSMENT & PLAN NOTE
Called [facility] to pick patient walker. Pt refused. They will have someone come pick it up.   Continue with Solu-Medrol 125 mg every 8 hourly  Continue with Rocephin and azithromycin  Continue with DuoNeb inhalation q. fourth hourly and Pulmicort

## 2022-08-20 ENCOUNTER — NON-PROVIDER VISIT (OUTPATIENT)
Dept: CARDIOLOGY | Facility: MEDICAL CENTER | Age: 47
End: 2022-08-20
Payer: MEDICAID

## 2022-08-20 PROCEDURE — 93298 REM INTERROG DEV EVAL SCRMS: CPT | Performed by: INTERNAL MEDICINE

## 2022-08-22 ENCOUNTER — APPOINTMENT (OUTPATIENT)
Dept: RADIOLOGY | Facility: MEDICAL CENTER | Age: 47
End: 2022-08-22
Attending: EMERGENCY MEDICINE
Payer: MEDICAID

## 2022-08-22 ENCOUNTER — HOSPITAL ENCOUNTER (EMERGENCY)
Facility: MEDICAL CENTER | Age: 47
End: 2022-08-22
Attending: EMERGENCY MEDICINE
Payer: MEDICAID

## 2022-08-22 VITALS
SYSTOLIC BLOOD PRESSURE: 130 MMHG | HEART RATE: 82 BPM | WEIGHT: 286 LBS | RESPIRATION RATE: 20 BRPM | DIASTOLIC BLOOD PRESSURE: 60 MMHG | HEIGHT: 62 IN | TEMPERATURE: 97.6 F | OXYGEN SATURATION: 94 % | BODY MASS INDEX: 52.63 KG/M2

## 2022-08-22 DIAGNOSIS — U07.1 COVID-19: ICD-10-CM

## 2022-08-22 DIAGNOSIS — J44.1 ACUTE EXACERBATION OF CHRONIC OBSTRUCTIVE PULMONARY DISEASE (COPD) (HCC): ICD-10-CM

## 2022-08-22 LAB
ALBUMIN SERPL BCP-MCNC: 4.1 G/DL (ref 3.2–4.9)
ALBUMIN/GLOB SERPL: 1.3 G/DL
ALP SERPL-CCNC: 114 U/L (ref 30–99)
ALT SERPL-CCNC: 15 U/L (ref 2–50)
ANION GAP SERPL CALC-SCNC: 10 MMOL/L (ref 7–16)
AST SERPL-CCNC: 17 U/L (ref 12–45)
BASOPHILS # BLD AUTO: 0.1 % (ref 0–1.8)
BASOPHILS # BLD: 0.01 K/UL (ref 0–0.12)
BILIRUB SERPL-MCNC: 0.3 MG/DL (ref 0.1–1.5)
BUN SERPL-MCNC: 14 MG/DL (ref 8–22)
CALCIUM SERPL-MCNC: 8.9 MG/DL (ref 8.5–10.5)
CHLORIDE SERPL-SCNC: 104 MMOL/L (ref 96–112)
CO2 SERPL-SCNC: 27 MMOL/L (ref 20–33)
CREAT SERPL-MCNC: 1.11 MG/DL (ref 0.5–1.4)
EKG IMPRESSION: NORMAL
EOSINOPHIL # BLD AUTO: 0.12 K/UL (ref 0–0.51)
EOSINOPHIL NFR BLD: 1.7 % (ref 0–6.9)
ERYTHROCYTE [DISTWIDTH] IN BLOOD BY AUTOMATED COUNT: 52.8 FL (ref 35.9–50)
FLUAV RNA SPEC QL NAA+PROBE: NEGATIVE
FLUBV RNA SPEC QL NAA+PROBE: NEGATIVE
GFR SERPLBLD CREATININE-BSD FMLA CKD-EPI: 61 ML/MIN/1.73 M 2
GLOBULIN SER CALC-MCNC: 3.1 G/DL (ref 1.9–3.5)
GLUCOSE SERPL-MCNC: 133 MG/DL (ref 65–99)
HCT VFR BLD AUTO: 39.5 % (ref 37–47)
HGB BLD-MCNC: 11.9 G/DL (ref 12–16)
IMM GRANULOCYTES # BLD AUTO: 0.06 K/UL (ref 0–0.11)
IMM GRANULOCYTES NFR BLD AUTO: 0.8 % (ref 0–0.9)
LACTATE SERPL-SCNC: 2.1 MMOL/L (ref 0.5–2)
LYMPHOCYTES # BLD AUTO: 2.28 K/UL (ref 1–4.8)
LYMPHOCYTES NFR BLD: 32.2 % (ref 22–41)
MCH RBC QN AUTO: 26.5 PG (ref 27–33)
MCHC RBC AUTO-ENTMCNC: 30.1 G/DL (ref 33.6–35)
MCV RBC AUTO: 88 FL (ref 81.4–97.8)
MONOCYTES # BLD AUTO: 0.45 K/UL (ref 0–0.85)
MONOCYTES NFR BLD AUTO: 6.4 % (ref 0–13.4)
NEUTROPHILS # BLD AUTO: 4.16 K/UL (ref 2–7.15)
NEUTROPHILS NFR BLD: 58.8 % (ref 44–72)
NRBC # BLD AUTO: 0 K/UL
NRBC BLD-RTO: 0 /100 WBC
NT-PROBNP SERPL IA-MCNC: 525 PG/ML (ref 0–125)
PLATELET # BLD AUTO: 236 K/UL (ref 164–446)
PMV BLD AUTO: 11.1 FL (ref 9–12.9)
POTASSIUM SERPL-SCNC: 4.1 MMOL/L (ref 3.6–5.5)
PROCALCITONIN SERPL-MCNC: 0.07 NG/ML
PROT SERPL-MCNC: 7.2 G/DL (ref 6–8.2)
RBC # BLD AUTO: 4.49 M/UL (ref 4.2–5.4)
RSV RNA SPEC QL NAA+PROBE: NEGATIVE
SARS-COV-2 RNA RESP QL NAA+PROBE: DETECTED
SODIUM SERPL-SCNC: 141 MMOL/L (ref 135–145)
SPECIMEN SOURCE: ABNORMAL
TROPONIN T SERPL-MCNC: 24 NG/L (ref 6–19)
WBC # BLD AUTO: 7.1 K/UL (ref 4.8–10.8)

## 2022-08-22 PROCEDURE — 80053 COMPREHEN METABOLIC PANEL: CPT

## 2022-08-22 PROCEDURE — 83880 ASSAY OF NATRIURETIC PEPTIDE: CPT

## 2022-08-22 PROCEDURE — 71045 X-RAY EXAM CHEST 1 VIEW: CPT

## 2022-08-22 PROCEDURE — 99284 EMERGENCY DEPT VISIT MOD MDM: CPT

## 2022-08-22 PROCEDURE — C9803 HOPD COVID-19 SPEC COLLECT: HCPCS | Performed by: EMERGENCY MEDICINE

## 2022-08-22 PROCEDURE — 700111 HCHG RX REV CODE 636 W/ 250 OVERRIDE (IP): Performed by: EMERGENCY MEDICINE

## 2022-08-22 PROCEDURE — 85025 COMPLETE CBC W/AUTO DIFF WBC: CPT

## 2022-08-22 PROCEDURE — 93005 ELECTROCARDIOGRAM TRACING: CPT | Performed by: EMERGENCY MEDICINE

## 2022-08-22 PROCEDURE — 84145 PROCALCITONIN (PCT): CPT

## 2022-08-22 PROCEDURE — 0241U HCHG SARS-COV-2 COVID-19 NFCT DS RESP RNA 4 TRGT MIC: CPT

## 2022-08-22 PROCEDURE — 83605 ASSAY OF LACTIC ACID: CPT

## 2022-08-22 PROCEDURE — 94640 AIRWAY INHALATION TREATMENT: CPT

## 2022-08-22 PROCEDURE — 36415 COLL VENOUS BLD VENIPUNCTURE: CPT

## 2022-08-22 PROCEDURE — 700101 HCHG RX REV CODE 250

## 2022-08-22 PROCEDURE — 84484 ASSAY OF TROPONIN QUANT: CPT

## 2022-08-22 RX ORDER — PREDNISONE 20 MG/1
60 TABLET ORAL ONCE
Status: COMPLETED | OUTPATIENT
Start: 2022-08-22 | End: 2022-08-22

## 2022-08-22 RX ORDER — PREDNISONE 20 MG/1
60 TABLET ORAL DAILY
Status: DISCONTINUED | OUTPATIENT
Start: 2022-08-22 | End: 2022-08-22 | Stop reason: HOSPADM

## 2022-08-22 RX ORDER — PREDNISONE 10 MG/1
TABLET ORAL
Qty: 32 TABLET | Refills: 0 | Status: ON HOLD | OUTPATIENT
Start: 2022-08-22 | End: 2022-08-30

## 2022-08-22 RX ORDER — AZITHROMYCIN 250 MG/1
TABLET, FILM COATED ORAL
Qty: 6 TABLET | Refills: 0 | Status: SHIPPED | OUTPATIENT
Start: 2022-08-22 | End: 2022-08-22

## 2022-08-22 RX ADMIN — ALBUTEROL SULFATE 2.5 MG: 2.5 SOLUTION RESPIRATORY (INHALATION) at 00:44

## 2022-08-22 RX ADMIN — Medication 2.5 MG: at 00:44

## 2022-08-22 RX ADMIN — PREDNISONE 60 MG: 20 TABLET ORAL at 02:47

## 2022-08-22 ASSESSMENT — FIBROSIS 4 INDEX: FIB4 SCORE: 0.64

## 2022-08-22 NOTE — ED PROVIDER NOTES
ED Provider Note    CHIEF COMPLAINT  Chief Complaint   Patient presents with    Shortness of Breath     X 1 week. Hx of COPD. Patient was unable to find her rescue inhaler for the last week. Patient given Albuterol and 1 Duoneb per EMS.     Cough     X 1 week       HPI  Lorene Haro is a 46 y.o. female who presents for evaluation of cough x1 week.  Patient has history of COPD and she states that she was unable to find her inhaler today.  She called EMS due to the cough which was getting severe enough that she could not attend to ADLs.  They found her inhaler in her medication bag and gave her a puff at home which seemed to help.  She arrives with a productive sounding cough and prominent inspiratory and expiratory wheezing.  She is saturating around 88% on room air and tachypneic.  She describes no fevers, chills, chest pain, abdominal pain, nausea, vomiting, or diarrhea.    REVIEW OF SYSTEMS  Constitutional: No fevers or chills  Skin: No rashes  HEENT: No sore throat, or runny nose  Neck: No neck pain  Chest: No pain or rashes  Pulm: No stridor, or pain with inspiration/expiration  Gastrointestinal: No nausea, vomiting, diarrhea or abdominal pain.  Genitourinary: No dysuria or hematuria  Musculoskeletal: No pain, swelling, weakness  Neurologic: No sensory or motor changes. No confusion or disorientation.  Heme: No bleeding or bruising problems.   Immuno: No hx of recurrent infections    PAST FAM HISTORY  Family History   Problem Relation Age of Onset    No Known Problems Mother     Cancer Sister        PAST MEDICAL HISTORY   has a past medical history of A-fib ([facility]), Asthma, Bipolar 2 disorder ([facility]), Chickenpox, Chronic obstructive pulmonary disease (HCC), Hypertension, On home oxygen therapy, Other psychological stress, Schizophrenic disorder ([facility]), and Yeast infection of the skin (4/1/2021).    SOCIAL HISTORY  Social History     Tobacco Use    Smoking status: Former    Smokeless tobacco: Never   Vaping Use  "   Vaping Use: Never used   Substance and Sexual Activity    Alcohol use: Never    Drug use: Never    Sexual activity: Not Currently       SURGICAL HISTORY   has a past surgical history that includes hysterectomy laparoscopy; colectomy; cholecystectomy; and knee arthroscopy (Left).    CURRENT MEDICATIONS  Home Medications    **Home medications have not yet been reviewed for this encounter**         ALLERGIES  Allergies   Allergen Reactions    Penicillin G Rash and Itching     pt reports that she gets a rash all over her body and gets itchy    Amoxicillin Itching    Amoxicillin Rash and Itching     Tolerates cephalosporins, pt reports that she gets a rash all over her body and gets itchy       PHYSICAL EXAM  VITAL SIGNS: /60   Pulse 82   Temp (P) 36.1 °C (97 °F) (Temporal)   Resp 20   Ht 1.575 m (5' 2\")   Wt (!) 130 kg (286 lb)   LMP  (LMP Unknown)   SpO2 94%   BMI 52.31 kg/m²    Gen: Appears tired, moderately distressed respiratory rate increased, moderately increased work of breathing  HEENT: No signs of trauma, Bilateral external ears normal, Nose normal. Conjunctiva normal, Non-icteric.   Neck:  No tenderness, Supple, No masses  Lymphatic: No cervical lymphadenopathy noted.   Cardiovascular: Heart here with regular rhythm, no murmurs.  Capillary refill less than 3 seconds to all extremities, 2+ distal pulses.  Thorax & Lungs: Prominent inspiratory and expiratory wheezing with tachypnea and increased work of breathing as evidenced by suprasternal retractions.  Moderately increased expiratory phase  Abdomen: Bowel sounds normal, Soft, No tenderness, No masses, No pulsatile masses. No Guarding or rebound  Skin: Warm, Dry.  Brawny blanchable erythema to lower extremities in the pretibial regions.  Back: No bony tenderness, No CVA tenderness.   Extremities: Intact distal pulses, chronic edema noted to lower extremities especially pretibial, ankle, and foot dorsum regions.  Neurologic: Alert , no facial " droop, grossly normal coordination and strength  Psychiatric: Affect mildly anxious        LABS  Results for orders placed or performed during the hospital encounter of 08/22/22   CBC WITH DIFFERENTIAL   Result Value Ref Range    WBC 7.1 4.8 - 10.8 K/uL    RBC 4.49 4.20 - 5.40 M/uL    Hemoglobin 11.9 (L) 12.0 - 16.0 g/dL    Hematocrit 39.5 37.0 - 47.0 %    MCV 88.0 81.4 - 97.8 fL    MCH 26.5 (L) 27.0 - 33.0 pg    MCHC 30.1 (L) 33.6 - 35.0 g/dL    RDW 52.8 (H) 35.9 - 50.0 fL    Platelet Count 236 164 - 446 K/uL    MPV 11.1 9.0 - 12.9 fL    Neutrophils-Polys 58.80 44.00 - 72.00 %    Lymphocytes 32.20 22.00 - 41.00 %    Monocytes 6.40 0.00 - 13.40 %    Eosinophils 1.70 0.00 - 6.90 %    Basophils 0.10 0.00 - 1.80 %    Immature Granulocytes 0.80 0.00 - 0.90 %    Nucleated RBC 0.00 /100 WBC    Neutrophils (Absolute) 4.16 2.00 - 7.15 K/uL    Lymphs (Absolute) 2.28 1.00 - 4.80 K/uL    Monos (Absolute) 0.45 0.00 - 0.85 K/uL    Eos (Absolute) 0.12 0.00 - 0.51 K/uL    Baso (Absolute) 0.01 0.00 - 0.12 K/uL    Immature Granulocytes (abs) 0.06 0.00 - 0.11 K/uL    NRBC (Absolute) 0.00 K/uL   COMP METABOLIC PANEL   Result Value Ref Range    Sodium 141 135 - 145 mmol/L    Potassium 4.1 3.6 - 5.5 mmol/L    Chloride 104 96 - 112 mmol/L    Co2 27 20 - 33 mmol/L    Anion Gap 10.0 7.0 - 16.0    Glucose 133 (H) 65 - 99 mg/dL    Bun 14 8 - 22 mg/dL    Creatinine 1.11 0.50 - 1.40 mg/dL    Calcium 8.9 8.5 - 10.5 mg/dL    AST(SGOT) 17 12 - 45 U/L    ALT(SGPT) 15 2 - 50 U/L    Alkaline Phosphatase 114 (H) 30 - 99 U/L    Total Bilirubin 0.3 0.1 - 1.5 mg/dL    Albumin 4.1 3.2 - 4.9 g/dL    Total Protein 7.2 6.0 - 8.2 g/dL    Globulin 3.1 1.9 - 3.5 g/dL    A-G Ratio 1.3 g/dL   COV-2, FLU A/B, AND RSV BY PCR (2-4 HOURS CEPHEID): Collect NP swab in VTM    Specimen: Respirate   Result Value Ref Range    Influenza virus A RNA Negative Negative    Influenza virus B, PCR Negative Negative    RSV, PCR Negative Negative    SARS-CoV-2 by PCR DETECTED (AA)      SARS-CoV-2 Source NP Swab    TROPONIN   Result Value Ref Range    Troponin T 24 (H) 6 - 19 ng/L   LACTIC ACID   Result Value Ref Range    Lactic Acid 2.1 (H) 0.5 - 2.0 mmol/L   proBrain Natriuretic Peptide, NT   Result Value Ref Range    NT-proBNP 525 (H) 0 - 125 pg/mL   PROCALCITONIN   Result Value Ref Range    Procalcitonin 0.07 <0.25 ng/mL   ESTIMATED GFR   Result Value Ref Range    GFR (CKD-EPI) 61 >60 mL/min/1.73 m 2   EKG (NOW)   Result Value Ref Range    Report       [facility] Emergency Dept.    Test Date:  2022  Pt Name:    ADELINA MILLAN                Department: ER  MRN:        [id number]                      Room:       RD 06  Gender:     Female                       Technician: [id number]  :        1975                   Requested By:FLOWER GUTIERREZ  Order #:    [id number]                    Reading MD:    Measurements  Intervals                                Axis  Rate:       75                           P:          23  OR:         127                          QRS:        51  QRSD:       104                          T:          6  QT:         383  QTc:        428    Interpretive Statements  Sinus rhythm  Probable left atrial enlargement  Low voltage, precordial leads  Baseline wander in lead(s) V2  Compared to ECG 2022 05:17:03  Low QRS voltage now present         RADIOLOGY  DX-CHEST-PORTABLE (1 VIEW)   Final Result         1.  Hazy left lower lobe infiltrate.              COURSE & MEDICAL DECISION MAKING  Patient arrives for evaluation of shortness of breath which is likely related to a COPD exacerbation given her prominent wheezing and increased respiratory effort.   she has a prolonged expiratory phase and will be given a DuoNeb in the emergency department.  She will have other extended laboratory evaluation and chest x-ray based on her comorbid problems including a morbidly elevated body mass index.  Patient is oxygen dependent at night and has an oxygen  concentrator at home which she was not using.  2:38 AM  Patient appears to have COVID which is the likely source of her exacerbation.  This is in addition to being noncompliant with her inhaler at home due to her inability to find either of the inhalers that were found by EMS on when they arrived.  As patient has inhalers at home I feel it reasonable to treat her with steroids for COPD exacerbation as she was wheezing.  Patient wears 3 L per nasal cannula at night and is on the same now saturating at reasonable levels in the mid 90s.  I do not feel she requires inpatient evaluation for this however she is high risk given her body habitus and comorbidities.  Regardless, I feel she is reasonable for trial of outpatient therapy.  She is noted to have a hazy left lower lobe infiltrate however this simply could be body habitus and I do not feel this represents a bacterial infectious process as her procalcitonin and white blood cell count are both normal and she is not febrile.  Patient states understanding of the plan for discharge.  She will return if her symptoms worsen or change in any way    FINAL IMPRESSION  1. Acute exacerbation of chronic obstructive pulmonary disease (COPD) ([facility])    2. COVID-19        Electronically signed by: Esteban Guillory M.D., 8/22/2022 12:35 AM

## 2022-08-22 NOTE — ED TRIAGE NOTES
Chief Complaint   Patient presents with    Shortness of Breath     X 1 week. Hx of COPD. Patient was unable to find her rescue inhaler for the last week. Patient given Albuterol and 1 Duoneb per EMS.     Cough     X 1 week

## 2022-08-22 NOTE — CARDIAC REMOTE MONITOR - SCAN
Device transmission reviewed. Device demonstrated appropriate function.       Electronically Signed by: Tyrell Solomon M.D.    8/30/2022  10:50 AM

## 2022-08-22 NOTE — ED NOTES
Pt discharged, all appropriate hospital equipment removed (IV, monitor, pulse ox, etc.). Pt left unit via walking with walker to lobby for medicaid taxi p/u. Personal belongings with pt when leaving unit. Pt given discharge instructions prior to leaving unit including where to  prescriptions and when to follow-up; verbalizes understanding. Pt informed to return to ED if symptoms worsen/return or altered status develop. Copy of discharge instructions signed and turned into DC basket and copy sent with pt. Pt given info regarding COVID, pt aware of needing to use O2 for appropriate COPD symptoms. Pt given Medicaid taxi sheet, explained how to use, demonstrated understanding.

## 2022-08-23 LAB
APOB+LDLR+PCSK9 GENE MUT ANL BLD/T: NOT DETECTED
BRCA1+BRCA2 DEL+DUP + FULL MUT ANL BLD/T: NOT DETECTED
MLH1+MSH2+MSH6+PMS2 GN DEL+DUP+FUL M: NOT DETECTED

## 2022-08-26 ENCOUNTER — APPOINTMENT (OUTPATIENT)
Dept: RADIOLOGY | Facility: MEDICAL CENTER | Age: 47
DRG: 202 | End: 2022-08-26
Attending: EMERGENCY MEDICINE
Payer: MEDICAID

## 2022-08-26 ENCOUNTER — HOSPITAL ENCOUNTER (INPATIENT)
Facility: MEDICAL CENTER | Age: 47
LOS: 4 days | DRG: 202 | End: 2022-08-30
Attending: EMERGENCY MEDICINE | Admitting: HOSPITALIST
Payer: MEDICAID

## 2022-08-26 DIAGNOSIS — J96.12 CHRONIC RESPIRATORY FAILURE WITH HYPOXIA AND HYPERCAPNIA (HCC): ICD-10-CM

## 2022-08-26 DIAGNOSIS — J96.11 CHRONIC RESPIRATORY FAILURE WITH HYPOXIA AND HYPERCAPNIA (HCC): ICD-10-CM

## 2022-08-26 DIAGNOSIS — J44.1 ACUTE EXACERBATION OF CHRONIC OBSTRUCTIVE PULMONARY DISEASE (COPD) (HCC): ICD-10-CM

## 2022-08-26 DIAGNOSIS — I50.9 ACUTE ON CHRONIC CONGESTIVE HEART FAILURE, UNSPECIFIED HEART FAILURE TYPE (HCC): ICD-10-CM

## 2022-08-26 DIAGNOSIS — R06.2 WHEEZE: ICD-10-CM

## 2022-08-26 DIAGNOSIS — K21.9 GASTROESOPHAGEAL REFLUX DISEASE, UNSPECIFIED WHETHER ESOPHAGITIS PRESENT: ICD-10-CM

## 2022-08-26 DIAGNOSIS — U07.1 ACUTE RESPIRATORY FAILURE DUE TO COVID-19 (HCC): ICD-10-CM

## 2022-08-26 DIAGNOSIS — J96.00 ACUTE RESPIRATORY FAILURE DUE TO COVID-19 (HCC): ICD-10-CM

## 2022-08-26 DIAGNOSIS — E66.2 OBESITY HYPOVENTILATION SYNDROME (HCC): ICD-10-CM

## 2022-08-26 DIAGNOSIS — F41.9 ANXIETY: ICD-10-CM

## 2022-08-26 DIAGNOSIS — J45.21 MILD INTERMITTENT REACTIVE AIRWAY DISEASE WITH ACUTE EXACERBATION: ICD-10-CM

## 2022-08-26 LAB
ALBUMIN SERPL BCP-MCNC: 4 G/DL (ref 3.2–4.9)
ALBUMIN/GLOB SERPL: 1.4 G/DL
ALP SERPL-CCNC: 110 U/L (ref 30–99)
ALT SERPL-CCNC: 44 U/L (ref 2–50)
ANION GAP SERPL CALC-SCNC: 9 MMOL/L (ref 7–16)
AST SERPL-CCNC: 17 U/L (ref 12–45)
BASOPHILS # BLD AUTO: 0.2 % (ref 0–1.8)
BASOPHILS # BLD: 0.02 K/UL (ref 0–0.12)
BILIRUB SERPL-MCNC: 0.6 MG/DL (ref 0.1–1.5)
BUN SERPL-MCNC: 25 MG/DL (ref 8–22)
CALCIUM SERPL-MCNC: 8.7 MG/DL (ref 8.5–10.5)
CHLORIDE SERPL-SCNC: 107 MMOL/L (ref 96–112)
CO2 SERPL-SCNC: 24 MMOL/L (ref 20–33)
CREAT SERPL-MCNC: 0.98 MG/DL (ref 0.5–1.4)
EKG IMPRESSION: NORMAL
EOSINOPHIL # BLD AUTO: 0.07 K/UL (ref 0–0.51)
EOSINOPHIL NFR BLD: 0.6 % (ref 0–6.9)
ERYTHROCYTE [DISTWIDTH] IN BLOOD BY AUTOMATED COUNT: 51.6 FL (ref 35.9–50)
GFR SERPLBLD CREATININE-BSD FMLA CKD-EPI: 71 ML/MIN/1.73 M 2
GLOBULIN SER CALC-MCNC: 2.8 G/DL (ref 1.9–3.5)
GLUCOSE SERPL-MCNC: 92 MG/DL (ref 65–99)
HCT VFR BLD AUTO: 37.9 % (ref 37–47)
HGB BLD-MCNC: 11.7 G/DL (ref 12–16)
IMM GRANULOCYTES # BLD AUTO: 0.17 K/UL (ref 0–0.11)
IMM GRANULOCYTES NFR BLD AUTO: 1.4 % (ref 0–0.9)
LACTATE SERPL-SCNC: 1.5 MMOL/L (ref 0.5–2)
LYMPHOCYTES # BLD AUTO: 2.39 K/UL (ref 1–4.8)
LYMPHOCYTES NFR BLD: 20.3 % (ref 22–41)
MCH RBC QN AUTO: 26.6 PG (ref 27–33)
MCHC RBC AUTO-ENTMCNC: 30.9 G/DL (ref 33.6–35)
MCV RBC AUTO: 86.1 FL (ref 81.4–97.8)
MONOCYTES # BLD AUTO: 0.84 K/UL (ref 0–0.85)
MONOCYTES NFR BLD AUTO: 7.1 % (ref 0–13.4)
NEUTROPHILS # BLD AUTO: 8.29 K/UL (ref 2–7.15)
NEUTROPHILS NFR BLD: 70.4 % (ref 44–72)
NRBC # BLD AUTO: 0.02 K/UL
NRBC BLD-RTO: 0.2 /100 WBC
NT-PROBNP SERPL IA-MCNC: 851 PG/ML (ref 0–125)
PLATELET # BLD AUTO: 247 K/UL (ref 164–446)
PMV BLD AUTO: 11.3 FL (ref 9–12.9)
POTASSIUM SERPL-SCNC: 3.8 MMOL/L (ref 3.6–5.5)
PROCALCITONIN SERPL-MCNC: 0.07 NG/ML
PROT SERPL-MCNC: 6.8 G/DL (ref 6–8.2)
RBC # BLD AUTO: 4.4 M/UL (ref 4.2–5.4)
SARS-COV-2 RNA RESP QL NAA+PROBE: DETECTED
SODIUM SERPL-SCNC: 140 MMOL/L (ref 135–145)
SPECIMEN SOURCE: ABNORMAL
TROPONIN T SERPL-MCNC: 25 NG/L (ref 6–19)
WBC # BLD AUTO: 11.8 K/UL (ref 4.8–10.8)

## 2022-08-26 PROCEDURE — 700101 HCHG RX REV CODE 250: Performed by: EMERGENCY MEDICINE

## 2022-08-26 PROCEDURE — 80053 COMPREHEN METABOLIC PANEL: CPT

## 2022-08-26 PROCEDURE — 36415 COLL VENOUS BLD VENIPUNCTURE: CPT

## 2022-08-26 PROCEDURE — U0005 INFEC AGEN DETEC AMPLI PROBE: HCPCS

## 2022-08-26 PROCEDURE — 94640 AIRWAY INHALATION TREATMENT: CPT

## 2022-08-26 PROCEDURE — 84145 PROCALCITONIN (PCT): CPT

## 2022-08-26 PROCEDURE — 83880 ASSAY OF NATRIURETIC PEPTIDE: CPT

## 2022-08-26 PROCEDURE — 700111 HCHG RX REV CODE 636 W/ 250 OVERRIDE (IP): Performed by: HOSPITALIST

## 2022-08-26 PROCEDURE — 96375 TX/PRO/DX INJ NEW DRUG ADDON: CPT

## 2022-08-26 PROCEDURE — 93005 ELECTROCARDIOGRAM TRACING: CPT | Performed by: EMERGENCY MEDICINE

## 2022-08-26 PROCEDURE — 8E0ZXY6 ISOLATION: ICD-10-PCS | Performed by: INTERNAL MEDICINE

## 2022-08-26 PROCEDURE — 85025 COMPLETE CBC W/AUTO DIFF WBC: CPT

## 2022-08-26 PROCEDURE — 700111 HCHG RX REV CODE 636 W/ 250 OVERRIDE (IP): Performed by: EMERGENCY MEDICINE

## 2022-08-26 PROCEDURE — 96376 TX/PRO/DX INJ SAME DRUG ADON: CPT

## 2022-08-26 PROCEDURE — 83605 ASSAY OF LACTIC ACID: CPT

## 2022-08-26 PROCEDURE — 87040 BLOOD CULTURE FOR BACTERIA: CPT

## 2022-08-26 PROCEDURE — 99223 1ST HOSP IP/OBS HIGH 75: CPT | Performed by: HOSPITALIST

## 2022-08-26 PROCEDURE — 770020 HCHG ROOM/CARE - TELE (206)

## 2022-08-26 PROCEDURE — 99285 EMERGENCY DEPT VISIT HI MDM: CPT

## 2022-08-26 PROCEDURE — 700102 HCHG RX REV CODE 250 W/ 637 OVERRIDE(OP): Performed by: HOSPITALIST

## 2022-08-26 PROCEDURE — 71045 X-RAY EXAM CHEST 1 VIEW: CPT

## 2022-08-26 PROCEDURE — 700101 HCHG RX REV CODE 250: Performed by: HOSPITALIST

## 2022-08-26 PROCEDURE — 84484 ASSAY OF TROPONIN QUANT: CPT

## 2022-08-26 PROCEDURE — 96365 THER/PROPH/DIAG IV INF INIT: CPT

## 2022-08-26 PROCEDURE — U0003 INFECTIOUS AGENT DETECTION BY NUCLEIC ACID (DNA OR RNA); SEVERE ACUTE RESPIRATORY SYNDROME CORONAVIRUS 2 (SARS-COV-2) (CORONAVIRUS DISEASE [COVID-19]), AMPLIFIED PROBE TECHNIQUE, MAKING USE OF HIGH THROUGHPUT TECHNOLOGIES AS DESCRIBED BY CMS-2020-01-R: HCPCS

## 2022-08-26 PROCEDURE — A9270 NON-COVERED ITEM OR SERVICE: HCPCS | Performed by: HOSPITALIST

## 2022-08-26 RX ORDER — AZITHROMYCIN 500 MG/1
500 INJECTION, POWDER, LYOPHILIZED, FOR SOLUTION INTRAVENOUS ONCE
Status: COMPLETED | OUTPATIENT
Start: 2022-08-26 | End: 2022-08-26

## 2022-08-26 RX ORDER — GUAIFENESIN/DEXTROMETHORPHAN 100-10MG/5
10 SYRUP ORAL EVERY 6 HOURS PRN
Status: DISCONTINUED | OUTPATIENT
Start: 2022-08-26 | End: 2022-08-30 | Stop reason: HOSPADM

## 2022-08-26 RX ORDER — PROCHLORPERAZINE EDISYLATE 5 MG/ML
5-10 INJECTION INTRAMUSCULAR; INTRAVENOUS EVERY 4 HOURS PRN
Status: DISCONTINUED | OUTPATIENT
Start: 2022-08-26 | End: 2022-08-30 | Stop reason: HOSPADM

## 2022-08-26 RX ORDER — FAMOTIDINE 20 MG/1
20 TABLET, FILM COATED ORAL 2 TIMES DAILY
Status: DISCONTINUED | OUTPATIENT
Start: 2022-08-26 | End: 2022-08-30 | Stop reason: HOSPADM

## 2022-08-26 RX ORDER — BISACODYL 10 MG
10 SUPPOSITORY, RECTAL RECTAL
Status: DISCONTINUED | OUTPATIENT
Start: 2022-08-26 | End: 2022-08-30 | Stop reason: HOSPADM

## 2022-08-26 RX ORDER — METHYLPREDNISOLONE SODIUM SUCCINATE 40 MG/ML
40 INJECTION, POWDER, LYOPHILIZED, FOR SOLUTION INTRAMUSCULAR; INTRAVENOUS DAILY
Status: COMPLETED | OUTPATIENT
Start: 2022-08-26 | End: 2022-08-30

## 2022-08-26 RX ORDER — CEPHALEXIN 500 MG/1
500 CAPSULE ORAL 4 TIMES DAILY
Status: ON HOLD | COMMUNITY
Start: 2022-08-08 | End: 2022-08-30

## 2022-08-26 RX ORDER — TRAZODONE HYDROCHLORIDE 50 MG/1
50 TABLET ORAL
Status: DISCONTINUED | OUTPATIENT
Start: 2022-08-26 | End: 2022-08-30 | Stop reason: HOSPADM

## 2022-08-26 RX ORDER — FUROSEMIDE 10 MG/ML
20 INJECTION INTRAMUSCULAR; INTRAVENOUS
Status: DISCONTINUED | OUTPATIENT
Start: 2022-08-26 | End: 2022-08-30 | Stop reason: HOSPADM

## 2022-08-26 RX ORDER — MONTELUKAST SODIUM 10 MG/1
10 TABLET ORAL
Status: DISCONTINUED | OUTPATIENT
Start: 2022-08-26 | End: 2022-08-30 | Stop reason: HOSPADM

## 2022-08-26 RX ORDER — ONDANSETRON 4 MG/1
4 TABLET, ORALLY DISINTEGRATING ORAL EVERY 4 HOURS PRN
Status: DISCONTINUED | OUTPATIENT
Start: 2022-08-26 | End: 2022-08-30 | Stop reason: HOSPADM

## 2022-08-26 RX ORDER — AZITHROMYCIN 250 MG/1
250-500 TABLET, FILM COATED ORAL DAILY
Status: ON HOLD | COMMUNITY
Start: 2022-08-22 | End: 2022-08-30

## 2022-08-26 RX ORDER — POTASSIUM CHLORIDE 20 MEQ/1
20 TABLET, EXTENDED RELEASE ORAL 2 TIMES DAILY
Status: DISCONTINUED | OUTPATIENT
Start: 2022-08-26 | End: 2022-08-30 | Stop reason: HOSPADM

## 2022-08-26 RX ORDER — PROMETHAZINE HYDROCHLORIDE 25 MG/1
12.5-25 TABLET ORAL EVERY 4 HOURS PRN
Status: DISCONTINUED | OUTPATIENT
Start: 2022-08-26 | End: 2022-08-30 | Stop reason: HOSPADM

## 2022-08-26 RX ORDER — IPRATROPIUM BROMIDE AND ALBUTEROL SULFATE 2.5; .5 MG/3ML; MG/3ML
3 SOLUTION RESPIRATORY (INHALATION)
Status: DISCONTINUED | OUTPATIENT
Start: 2022-08-26 | End: 2022-08-27

## 2022-08-26 RX ORDER — IPRATROPIUM BROMIDE AND ALBUTEROL SULFATE 2.5; .5 MG/3ML; MG/3ML
3 SOLUTION RESPIRATORY (INHALATION)
Status: DISCONTINUED | OUTPATIENT
Start: 2022-08-26 | End: 2022-08-26

## 2022-08-26 RX ORDER — CEFTRIAXONE 2 G/1
2 INJECTION, POWDER, FOR SOLUTION INTRAMUSCULAR; INTRAVENOUS ONCE
Status: COMPLETED | OUTPATIENT
Start: 2022-08-26 | End: 2022-08-26

## 2022-08-26 RX ORDER — HYDRALAZINE HYDROCHLORIDE 20 MG/ML
10 INJECTION INTRAMUSCULAR; INTRAVENOUS EVERY 4 HOURS PRN
Status: DISCONTINUED | OUTPATIENT
Start: 2022-08-26 | End: 2022-08-30 | Stop reason: HOSPADM

## 2022-08-26 RX ORDER — ONDANSETRON 2 MG/ML
4 INJECTION INTRAMUSCULAR; INTRAVENOUS EVERY 4 HOURS PRN
Status: DISCONTINUED | OUTPATIENT
Start: 2022-08-26 | End: 2022-08-30 | Stop reason: HOSPADM

## 2022-08-26 RX ORDER — IPRATROPIUM BROMIDE AND ALBUTEROL SULFATE 2.5; .5 MG/3ML; MG/3ML
3 SOLUTION RESPIRATORY (INHALATION)
Status: DISCONTINUED | OUTPATIENT
Start: 2022-08-26 | End: 2022-08-30 | Stop reason: HOSPADM

## 2022-08-26 RX ORDER — IPRATROPIUM BROMIDE AND ALBUTEROL SULFATE 2.5; .5 MG/3ML; MG/3ML
3 SOLUTION RESPIRATORY (INHALATION) EVERY 4 HOURS PRN
Refills: 0 | Status: CANCELLED | OUTPATIENT
Start: 2022-08-26

## 2022-08-26 RX ORDER — TRAZODONE HYDROCHLORIDE 50 MG/1
50-100 TABLET ORAL NIGHTLY
Status: ON HOLD | COMMUNITY
End: 2022-08-30

## 2022-08-26 RX ORDER — PROMETHAZINE HYDROCHLORIDE 25 MG/1
12.5-25 SUPPOSITORY RECTAL EVERY 4 HOURS PRN
Status: DISCONTINUED | OUTPATIENT
Start: 2022-08-26 | End: 2022-08-30 | Stop reason: HOSPADM

## 2022-08-26 RX ORDER — METHYLPREDNISOLONE SODIUM SUCCINATE 40 MG/ML
40 INJECTION, POWDER, LYOPHILIZED, FOR SOLUTION INTRAMUSCULAR; INTRAVENOUS ONCE
Status: COMPLETED | OUTPATIENT
Start: 2022-08-26 | End: 2022-08-26

## 2022-08-26 RX ORDER — FUROSEMIDE 10 MG/ML
40 INJECTION INTRAMUSCULAR; INTRAVENOUS ONCE
Status: COMPLETED | OUTPATIENT
Start: 2022-08-26 | End: 2022-08-26

## 2022-08-26 RX ORDER — ARIPIPRAZOLE 15 MG/1
30 TABLET ORAL EVERY MORNING
Status: DISCONTINUED | OUTPATIENT
Start: 2022-08-27 | End: 2022-08-30 | Stop reason: HOSPADM

## 2022-08-26 RX ORDER — ATORVASTATIN CALCIUM 20 MG/1
20 TABLET, FILM COATED ORAL
Status: DISCONTINUED | OUTPATIENT
Start: 2022-08-26 | End: 2022-08-30 | Stop reason: HOSPADM

## 2022-08-26 RX ORDER — POLYETHYLENE GLYCOL 3350 17 G/17G
1 POWDER, FOR SOLUTION ORAL
Status: DISCONTINUED | OUTPATIENT
Start: 2022-08-26 | End: 2022-08-30 | Stop reason: HOSPADM

## 2022-08-26 RX ORDER — AZITHROMYCIN 250 MG/1
500 TABLET, FILM COATED ORAL DAILY
Status: COMPLETED | OUTPATIENT
Start: 2022-08-27 | End: 2022-08-28

## 2022-08-26 RX ORDER — AMOXICILLIN 250 MG
2 CAPSULE ORAL 2 TIMES DAILY
Status: DISCONTINUED | OUTPATIENT
Start: 2022-08-26 | End: 2022-08-30 | Stop reason: HOSPADM

## 2022-08-26 RX ADMIN — METHYLPREDNISOLONE SODIUM SUCCINATE 40 MG: 40 INJECTION, POWDER, FOR SOLUTION INTRAMUSCULAR; INTRAVENOUS at 06:28

## 2022-08-26 RX ADMIN — AZITHROMYCIN MONOHYDRATE 500 MG: 500 INJECTION, POWDER, LYOPHILIZED, FOR SOLUTION INTRAVENOUS at 08:55

## 2022-08-26 RX ADMIN — MONTELUKAST 10 MG: 10 TABLET, FILM COATED ORAL at 21:10

## 2022-08-26 RX ADMIN — IPRATROPIUM BROMIDE AND ALBUTEROL SULFATE 3 ML: 2.5; .5 SOLUTION RESPIRATORY (INHALATION) at 10:49

## 2022-08-26 RX ADMIN — RIVAROXABAN 10 MG: 10 TABLET, FILM COATED ORAL at 21:10

## 2022-08-26 RX ADMIN — FUROSEMIDE 20 MG: 10 INJECTION, SOLUTION INTRAMUSCULAR; INTRAVENOUS at 11:18

## 2022-08-26 RX ADMIN — ATORVASTATIN CALCIUM 20 MG: 20 TABLET, FILM COATED ORAL at 21:09

## 2022-08-26 RX ADMIN — TRAZODONE HYDROCHLORIDE 50 MG: 50 TABLET ORAL at 21:09

## 2022-08-26 RX ADMIN — IPRATROPIUM BROMIDE AND ALBUTEROL SULFATE 3 ML: 2.5; .5 SOLUTION RESPIRATORY (INHALATION) at 06:59

## 2022-08-26 RX ADMIN — IPRATROPIUM BROMIDE AND ALBUTEROL SULFATE 3 ML: 2.5; .5 SOLUTION RESPIRATORY (INHALATION) at 23:15

## 2022-08-26 RX ADMIN — POTASSIUM CHLORIDE 20 MEQ: 1500 TABLET, EXTENDED RELEASE ORAL at 11:15

## 2022-08-26 RX ADMIN — FAMOTIDINE 20 MG: 20 TABLET, FILM COATED ORAL at 21:10

## 2022-08-26 RX ADMIN — CEFTRIAXONE SODIUM 2 G: 2 INJECTION, POWDER, FOR SOLUTION INTRAMUSCULAR; INTRAVENOUS at 08:36

## 2022-08-26 RX ADMIN — POTASSIUM CHLORIDE 20 MEQ: 1500 TABLET, EXTENDED RELEASE ORAL at 17:09

## 2022-08-26 RX ADMIN — METHYLPREDNISOLONE SODIUM SUCCINATE 40 MG: 40 INJECTION, POWDER, FOR SOLUTION INTRAMUSCULAR; INTRAVENOUS at 11:12

## 2022-08-26 RX ADMIN — FUROSEMIDE 20 MG: 10 INJECTION, SOLUTION INTRAMUSCULAR; INTRAVENOUS at 17:07

## 2022-08-26 RX ADMIN — GUAIFENESIN SYRUP AND DEXTROMETHORPHAN 10 ML: 100; 10 SYRUP ORAL at 21:11

## 2022-08-26 RX ADMIN — FUROSEMIDE 40 MG: 10 INJECTION, SOLUTION INTRAMUSCULAR; INTRAVENOUS at 08:13

## 2022-08-26 ASSESSMENT — ENCOUNTER SYMPTOMS
SHORTNESS OF BREATH: 1
CHILLS: 0
FEVER: 0
PSYCHIATRIC NEGATIVE: 1
MUSCULOSKELETAL NEGATIVE: 1
GASTROINTESTINAL NEGATIVE: 1
SPUTUM PRODUCTION: 0
WHEEZING: 1
EYES NEGATIVE: 1
COUGH: 1
NEUROLOGICAL NEGATIVE: 1

## 2022-08-26 ASSESSMENT — FIBROSIS 4 INDEX
FIB4 SCORE: 0.5
FIB4 SCORE: 0.89

## 2022-08-26 ASSESSMENT — PAIN DESCRIPTION - PAIN TYPE: TYPE: ACUTE PAIN

## 2022-08-26 NOTE — ED NOTES
Pt is A&O x 4. Pt presents to the ED with sob that started after she was walking this evening. Pt states that she has cp upon inspiration. Pt has a h/x of COPD. Pt was given albuterol and duoneb via EMS. Pt breathing is easy and non labored. Pt has bilateral wheezes with diminished lung sounds. Pt was transferred from EMS cot to bed. Pt skin is w/d. Per EMS she has a history of afib but they stopped her warfarin.

## 2022-08-26 NOTE — ED NOTES
Rounded on patient. Resting comfortably, no signs of distress. Bed locked and in lowest position. Call light within reach.

## 2022-08-26 NOTE — ED PROVIDER NOTES
ED Provider Note    CHIEF COMPLAINT  Chief Complaint   Patient presents with    Shortness of Breath     Pt arrives via EMS with sob. Pt has inspiratory cp. H/x COPD       HPI  Lorene Haro is a 46 y.o. female who presents with shortness of breath.  Reports started 2 days ago.  Gradually worsening.  Now severe.  Patient was recently in the hospital with shortness of breath.  She was diagnosed with COVID and COPD exacerbation.  She was discharged on oral steroids which she states she has been taking.  She has her inhaler at home that she tried to take without relief today.  She has not had fe return vers.  She has continued cough and congestion.  Denies sore throat.  Has not had abdominal pain, nausea vomiting diarrhea.  She never can lie flat because it results in shortness of breath.  There is no change with this.  She has chronic lower extremity swelling which patient is unsure if it is worse    REVIEW OF SYSTEMS  As per HPI, otherwise a 10 point review of systems is negative    PAST MEDICAL HISTORY  Past Medical History:   Diagnosis Date    A-fib (HCC)     Asthma     Bipolar 2 disorder (HCC)     Chickenpox     Chronic obstructive pulmonary disease (HCC)     Hypertension     On home oxygen therapy     Other psychological stress     Schizophrenic disorder (HCC)     Yeast infection of the skin 4/1/2021       SOCIAL HISTORY  Social History     Tobacco Use    Smoking status: Former    Smokeless tobacco: Never   Vaping Use    Vaping Use: Never used   Substance Use Topics    Alcohol use: Never    Drug use: Never       SURGICAL HISTORY  Past Surgical History:   Procedure Laterality Date    CHOLECYSTECTOMY      COLECTOMY      HYSTERECTOMY LAPAROSCOPY      KNEE ARTHROSCOPY Left        CURRENT MEDICATIONS  Home Medications    **Home medications have not yet been reviewed for this encounter**         ALLERGIES  Allergies   Allergen Reactions    Penicillin G Rash and Itching     pt reports that she gets a rash all over  "her body and gets itchy    Amoxicillin Itching    Amoxicillin Rash and Itching     Tolerates cephalosporins, pt reports that she gets a rash all over her body and gets itchy       PHYSICAL EXAM  VITAL SIGNS: /72   Pulse 72   Temp 36.2 °C (97.2 °F) (Temporal)   Resp 20   Ht 1.575 m (5' 2\")   Wt (!) 135 kg (298 lb)   LMP  (LMP Unknown)   SpO2 94%   BMI 54.50 kg/m²    Constitutional: Awake and alert.  Breathless  HENT: Normal inspection  Eyes: Normal inspection  Neck: Grossly normal range of motion.  Cardiovascular: Normal heart rate, Normal rhythm.  Symmetric peripheral pulses.   Thorax & Lungs:  increased respiratory effort with tachypnea.  Diffuse wheezing.  No focal crackles or rhonchi  Abdomen: Bowel sounds normal, soft, non-distended, nontender, no mass  Skin: No obvious rash.  Back: No tenderness, No CVA tenderness.   Extremities: Bilateral lower extremity edema present with chronic venous stasis changes  Neurologic: Grossly normal   Psychiatric: Normal for situation    RADIOLOGY/PROCEDURES  DX-CHEST-PORTABLE (1 VIEW)   Final Result         1.  Pulmonary edema and/or infiltrates.   2.  Cardiomegaly   3.  Atherosclerosis           Imaging is interpreted by radiologist    Labs:  Results for orders placed or performed during the hospital encounter of 08/26/22   CBC with Differential   Result Value Ref Range    WBC 11.8 (H) 4.8 - 10.8 K/uL    RBC 4.40 4.20 - 5.40 M/uL    Hemoglobin 11.7 (L) 12.0 - 16.0 g/dL    Hematocrit 37.9 37.0 - 47.0 %    MCV 86.1 81.4 - 97.8 fL    MCH 26.6 (L) 27.0 - 33.0 pg    MCHC 30.9 (L) 33.6 - 35.0 g/dL    RDW 51.6 (H) 35.9 - 50.0 fL    Platelet Count 247 164 - 446 K/uL    MPV 11.3 9.0 - 12.9 fL    Neutrophils-Polys 70.40 44.00 - 72.00 %    Lymphocytes 20.30 (L) 22.00 - 41.00 %    Monocytes 7.10 0.00 - 13.40 %    Eosinophils 0.60 0.00 - 6.90 %    Basophils 0.20 0.00 - 1.80 %    Immature Granulocytes 1.40 (H) 0.00 - 0.90 %    Nucleated RBC 0.20 /100 WBC    Neutrophils " (Absolute) 8.29 (H) 2.00 - 7.15 K/uL    Lymphs (Absolute) 2.39 1.00 - 4.80 K/uL    Monos (Absolute) 0.84 0.00 - 0.85 K/uL    Eos (Absolute) 0.07 0.00 - 0.51 K/uL    Baso (Absolute) 0.02 0.00 - 0.12 K/uL    Immature Granulocytes (abs) 0.17 (H) 0.00 - 0.11 K/uL    NRBC (Absolute) 0.02 K/uL   Comp Metabolic Panel   Result Value Ref Range    Sodium 140 135 - 145 mmol/L    Potassium 3.8 3.6 - 5.5 mmol/L    Chloride 107 96 - 112 mmol/L    Co2 24 20 - 33 mmol/L    Anion Gap 9.0 7.0 - 16.0    Glucose 92 65 - 99 mg/dL    Bun 25 (H) 8 - 22 mg/dL    Creatinine 0.98 0.50 - 1.40 mg/dL    Calcium 8.7 8.5 - 10.5 mg/dL    AST(SGOT) 17 12 - 45 U/L    ALT(SGPT) 44 2 - 50 U/L    Alkaline Phosphatase 110 (H) 30 - 99 U/L    Total Bilirubin 0.6 0.1 - 1.5 mg/dL    Albumin 4.0 3.2 - 4.9 g/dL    Total Protein 6.8 6.0 - 8.2 g/dL    Globulin 2.8 1.9 - 3.5 g/dL    A-G Ratio 1.4 g/dL   SARS-CoV-2, PCR (24 Hour In-House) Collect NP swab in VTM    Specimen: Respirate   Result Value Ref Range    SARS-CoV-2 Source NP Swab    TROPONIN   Result Value Ref Range    Troponin T 25 (H) 6 - 19 ng/L   proBrain Natriuretic Peptide, NT   Result Value Ref Range    NT-proBNP 851 (H) 0 - 125 pg/mL   ESTIMATED GFR   Result Value Ref Range    GFR (CKD-EPI) 71 >60 mL/min/1.73 m 2   EKG   Result Value Ref Range    Report       [facility] Emergency Dept.    Test Date:  2022  Pt Name:    ADELINA MILLAN                Department: ER  MRN:        [id number]                      Room:       RD 10  Gender:     Female                       Technician: [id number]  :        [id number]                   Requested By:ER TRIAGE PROTOCOL  Order #:    [id number]                    Reading MD: BECKY TIDWELL MD    Measurements  Intervals                                Axis  Rate:       70                           P:          91  VA:         112                          QRS:        68  QRSD:       106                          T:          14  QT:          407  QTc:        440    Interpretive Statements  Sinus rhythm With artifact  Atrial premature complexes  Borderline short MA interval  Probable left atrial enlargement  Low voltage, precordial leads    Compared to ECG 08/22/2022 00:42:50  Atrial premature complex(es) now present  Right ventricular hypertrophy now present  Electronically Signed On 8-26-2 022 6:29:58 PDT by BECKY TIDWELL MD         Medications   ipratropium-albuterol (DUONEB) nebulizer solution (3 mL Nebulization Given 8/26/22 0659)   cefTRIAXone (Rocephin) injection 2 g (has no administration in time range)   azithromycin (ZITHROMAX) injection 500 mg (has no administration in time range)   furosemide (LASIX) injection 40 mg (has no administration in time range)   methylPREDNISolone (SOLU-MEDROL) 40 MG injection 40 mg (40 mg Intravenous Given 8/26/22 0628)         COURSE & MEDICAL DECISION MAKING  Patient presents with dyspnea.  Has been going on for about a week.  Recently diagnosed with COVID.  She has bronchospasm on examination.  Working diagnosis COPD exacerbation with failed outpatient treatment in the setting of COVID-19.  Patient was given DuoNeb prior to arrival with improvement.  Still had a fair amount of wheezing and subjective dyspnea.  Ordered second DuoNeb treatment.  40 mg of Solu-Medrol.  Obtain diagnostic testing.    Chest x-ray demonstrates edema versus infiltrate.  Has significant cardiomegaly which looks worse than previous.    Laboratory data began to return.  leukocytosis with immature granulocytosis resulted around 7:30 AM.  At that time infection was suspected sepsis protocol was initiated and ordered lactic acid and blood culture.  Treated with antibiotic ceftriaxone and azithromycin.    BNP is elevated at 800 greater than baseline.  Treated with Lasix intravenously.    At this point the patient is still tachypneic with dyspnea that appears to be multifactorial.  Given the degree of dyspnea that is still frankly  undifferentiated related to congestive heart failure and volume overload, COPD, COVID and possible pneumonia I consulted hospitalist for admission.      FINAL IMPRESSION  1.  Dyspnea  2.  COPD with exacerbation, failed outpatient treatment   3.  congestive heart failure with volume overload  4.  Possible pneumonia   5.  COVID-19      This dictation was created using voice recognition software. The accuracy of the dictation is limited to the abilities of the software.  The nursing notes were reviewed and certain aspects of this information were incorporated into this note.      Electronically signed by: David Alejandra M.D., 8/26/2022 6:25 AM

## 2022-08-26 NOTE — ED NOTES
Med rec completed per pt's home pharmacy ([facility])  Allergies reviewed  Pt unable to verify what medications she is taking or when she last took them     Per [facility] pt filled a Z-Romario and a tapering course of Prednisone on 8/22/2022    Per [facility] pt filled a 7 day course of Keflex on 8/8/2022 (Unable to verify if pt completed this antibiotic)

## 2022-08-26 NOTE — ED NOTES
Patient report received from JC Douglas. Assumed care of patient. Rounding complete. Bed locked and in lowest position. Call light within reach. Patient denies any pain at this time.

## 2022-08-27 LAB
ANION GAP SERPL CALC-SCNC: 6 MMOL/L (ref 7–16)
BASE EXCESS BLDA CALC-SCNC: 4 MMOL/L (ref -4–3)
BODY TEMPERATURE: 36.1 CENTIGRADE
BUN SERPL-MCNC: 28 MG/DL (ref 8–22)
CALCIUM SERPL-MCNC: 8.4 MG/DL (ref 8.5–10.5)
CHLORIDE SERPL-SCNC: 104 MMOL/L (ref 96–112)
CO2 SERPL-SCNC: 29 MMOL/L (ref 20–33)
CREAT SERPL-MCNC: 0.89 MG/DL (ref 0.5–1.4)
D DIMER PPP IA.FEU-MCNC: 0.27 UG/ML (FEU) (ref 0–0.5)
ERYTHROCYTE [DISTWIDTH] IN BLOOD BY AUTOMATED COUNT: 53.9 FL (ref 35.9–50)
GFR SERPLBLD CREATININE-BSD FMLA CKD-EPI: 79 ML/MIN/1.73 M 2
GLUCOSE SERPL-MCNC: 130 MG/DL (ref 65–99)
HCO3 BLDA-SCNC: 31 MMOL/L (ref 17–25)
HCT VFR BLD AUTO: 37.6 % (ref 37–47)
HGB BLD-MCNC: 11.3 G/DL (ref 12–16)
MCH RBC QN AUTO: 26.5 PG (ref 27–33)
MCHC RBC AUTO-ENTMCNC: 30.1 G/DL (ref 33.6–35)
MCV RBC AUTO: 88.1 FL (ref 81.4–97.8)
NT-PROBNP SERPL IA-MCNC: 535 PG/ML (ref 0–125)
PCO2 BLDA: 60.2 MMHG (ref 26–37)
PCO2 TEMP ADJ BLDA: 57.9 MMHG (ref 26–37)
PH BLDA: 7.33 [PH] (ref 7.4–7.5)
PH TEMP ADJ BLDA: 7.34 [PH] (ref 7.4–7.5)
PLATELET # BLD AUTO: 238 K/UL (ref 164–446)
PMV BLD AUTO: 11.5 FL (ref 9–12.9)
PO2 BLDA: 56.8 MMHG (ref 64–87)
PO2 TEMP ADJ BLDA: 53.4 MMHG (ref 64–87)
POTASSIUM SERPL-SCNC: 4.7 MMOL/L (ref 3.6–5.5)
RBC # BLD AUTO: 4.27 M/UL (ref 4.2–5.4)
SAO2 % BLDA: 87.1 % (ref 93–99)
SODIUM SERPL-SCNC: 139 MMOL/L (ref 135–145)
WBC # BLD AUTO: 10.2 K/UL (ref 4.8–10.8)

## 2022-08-27 PROCEDURE — 82803 BLOOD GASES ANY COMBINATION: CPT

## 2022-08-27 PROCEDURE — 94640 AIRWAY INHALATION TREATMENT: CPT

## 2022-08-27 PROCEDURE — 85027 COMPLETE CBC AUTOMATED: CPT

## 2022-08-27 PROCEDURE — 94760 N-INVAS EAR/PLS OXIMETRY 1: CPT

## 2022-08-27 PROCEDURE — 99233 SBSQ HOSP IP/OBS HIGH 50: CPT | Performed by: STUDENT IN AN ORGANIZED HEALTH CARE EDUCATION/TRAINING PROGRAM

## 2022-08-27 PROCEDURE — 99251 PR INITIAL INPATIENT CONSULT,LEVL I: CPT | Performed by: INTERNAL MEDICINE

## 2022-08-27 PROCEDURE — 700101 HCHG RX REV CODE 250: Performed by: STUDENT IN AN ORGANIZED HEALTH CARE EDUCATION/TRAINING PROGRAM

## 2022-08-27 PROCEDURE — 94669 MECHANICAL CHEST WALL OSCILL: CPT

## 2022-08-27 PROCEDURE — 94664 DEMO&/EVAL PT USE INHALER: CPT

## 2022-08-27 PROCEDURE — A9270 NON-COVERED ITEM OR SERVICE: HCPCS | Performed by: HOSPITALIST

## 2022-08-27 PROCEDURE — 700111 HCHG RX REV CODE 636 W/ 250 OVERRIDE (IP): Performed by: HOSPITALIST

## 2022-08-27 PROCEDURE — 94660 CPAP INITIATION&MGMT: CPT

## 2022-08-27 PROCEDURE — 36415 COLL VENOUS BLD VENIPUNCTURE: CPT

## 2022-08-27 PROCEDURE — 85379 FIBRIN DEGRADATION QUANT: CPT

## 2022-08-27 PROCEDURE — 770020 HCHG ROOM/CARE - TELE (206)

## 2022-08-27 PROCEDURE — 83880 ASSAY OF NATRIURETIC PEPTIDE: CPT

## 2022-08-27 PROCEDURE — 80048 BASIC METABOLIC PNL TOTAL CA: CPT

## 2022-08-27 PROCEDURE — 700102 HCHG RX REV CODE 250 W/ 637 OVERRIDE(OP): Performed by: HOSPITALIST

## 2022-08-27 PROCEDURE — 700101 HCHG RX REV CODE 250: Performed by: HOSPITALIST

## 2022-08-27 RX ORDER — BUDESONIDE AND FORMOTEROL FUMARATE DIHYDRATE 160; 4.5 UG/1; UG/1
2 AEROSOL RESPIRATORY (INHALATION)
Status: DISCONTINUED | OUTPATIENT
Start: 2022-08-27 | End: 2022-08-30 | Stop reason: HOSPADM

## 2022-08-27 RX ORDER — IPRATROPIUM BROMIDE AND ALBUTEROL SULFATE 2.5; .5 MG/3ML; MG/3ML
3 SOLUTION RESPIRATORY (INHALATION)
Status: DISCONTINUED | OUTPATIENT
Start: 2022-08-27 | End: 2022-08-28

## 2022-08-27 RX ADMIN — FUROSEMIDE 20 MG: 10 INJECTION, SOLUTION INTRAMUSCULAR; INTRAVENOUS at 07:54

## 2022-08-27 RX ADMIN — FAMOTIDINE 20 MG: 20 TABLET, FILM COATED ORAL at 07:55

## 2022-08-27 RX ADMIN — GUAIFENESIN SYRUP AND DEXTROMETHORPHAN 10 ML: 100; 10 SYRUP ORAL at 10:09

## 2022-08-27 RX ADMIN — AZITHROMYCIN MONOHYDRATE 500 MG: 250 TABLET ORAL at 07:55

## 2022-08-27 RX ADMIN — MONTELUKAST 10 MG: 10 TABLET, FILM COATED ORAL at 20:31

## 2022-08-27 RX ADMIN — RIVAROXABAN 10 MG: 10 TABLET, FILM COATED ORAL at 17:02

## 2022-08-27 RX ADMIN — GUAIFENESIN SYRUP AND DEXTROMETHORPHAN 10 ML: 100; 10 SYRUP ORAL at 16:21

## 2022-08-27 RX ADMIN — FAMOTIDINE 20 MG: 20 TABLET, FILM COATED ORAL at 17:01

## 2022-08-27 RX ADMIN — IPRATROPIUM BROMIDE AND ALBUTEROL SULFATE 3 ML: 2.5; .5 SOLUTION RESPIRATORY (INHALATION) at 14:26

## 2022-08-27 RX ADMIN — IPRATROPIUM BROMIDE AND ALBUTEROL SULFATE 3 ML: 2.5; .5 SOLUTION RESPIRATORY (INHALATION) at 10:29

## 2022-08-27 RX ADMIN — FUROSEMIDE 20 MG: 10 INJECTION, SOLUTION INTRAMUSCULAR; INTRAVENOUS at 16:21

## 2022-08-27 RX ADMIN — POTASSIUM CHLORIDE 20 MEQ: 1500 TABLET, EXTENDED RELEASE ORAL at 07:55

## 2022-08-27 RX ADMIN — IPRATROPIUM BROMIDE AND ALBUTEROL SULFATE 3 ML: 2.5; .5 SOLUTION RESPIRATORY (INHALATION) at 02:02

## 2022-08-27 RX ADMIN — METHYLPREDNISOLONE SODIUM SUCCINATE 40 MG: 40 INJECTION, POWDER, FOR SOLUTION INTRAMUSCULAR; INTRAVENOUS at 07:55

## 2022-08-27 RX ADMIN — ATORVASTATIN CALCIUM 20 MG: 20 TABLET, FILM COATED ORAL at 20:31

## 2022-08-27 RX ADMIN — POTASSIUM CHLORIDE 20 MEQ: 1500 TABLET, EXTENDED RELEASE ORAL at 17:02

## 2022-08-27 RX ADMIN — ARIPIPRAZOLE 30 MG: 15 TABLET ORAL at 07:56

## 2022-08-27 RX ADMIN — TRAZODONE HYDROCHLORIDE 50 MG: 50 TABLET ORAL at 20:32

## 2022-08-27 RX ADMIN — IPRATROPIUM BROMIDE AND ALBUTEROL SULFATE 3 ML: 2.5; .5 SOLUTION RESPIRATORY (INHALATION) at 18:13

## 2022-08-27 ASSESSMENT — ENCOUNTER SYMPTOMS
PALPITATIONS: 0
EYES NEGATIVE: 1
COUGH: 1
NAUSEA: 0
PSYCHIATRIC NEGATIVE: 1
CHILLS: 0
SHORTNESS OF BREATH: 1
WHEEZING: 1
WEAKNESS: 1
ABDOMINAL PAIN: 0
GASTROINTESTINAL NEGATIVE: 1
MUSCULOSKELETAL NEGATIVE: 1
WHEEZING: 0
VOMITING: 0
FEVER: 0

## 2022-08-27 ASSESSMENT — COGNITIVE AND FUNCTIONAL STATUS - GENERAL
STANDING UP FROM CHAIR USING ARMS: A LITTLE
HELP NEEDED FOR BATHING: A LITTLE
CLIMB 3 TO 5 STEPS WITH RAILING: A LITTLE
SUGGESTED CMS G CODE MODIFIER DAILY ACTIVITY: CJ
MOBILITY SCORE: 20
DAILY ACTIVITIY SCORE: 22
TURNING FROM BACK TO SIDE WHILE IN FLAT BAD: A LITTLE
MOVING TO AND FROM BED TO CHAIR: A LITTLE
DRESSING REGULAR LOWER BODY CLOTHING: A LITTLE
SUGGESTED CMS G CODE MODIFIER MOBILITY: CJ

## 2022-08-27 ASSESSMENT — PAIN DESCRIPTION - PAIN TYPE
TYPE: ACUTE PAIN

## 2022-08-27 ASSESSMENT — COPD QUESTIONNAIRES
DURING THE PAST 4 WEEKS HOW MUCH DID YOU FEEL SHORT OF BREATH: MOST  OR ALL OF THE TIME
HAVE YOU SMOKED AT LEAST 100 CIGARETTES IN YOUR ENTIRE LIFE: YES
COPD SCREENING SCORE: 7
DO YOU EVER COUGH UP ANY MUCUS OR PHLEGM?: YES, EVERY DAY

## 2022-08-27 ASSESSMENT — FIBROSIS 4 INDEX: FIB4 SCORE: 0.52

## 2022-08-27 NOTE — ASSESSMENT & PLAN NOTE
Weight loss and exercise recommended. Appears to have a history of obesity hypoventilation syndrome

## 2022-08-27 NOTE — PROGRESS NOTES
Hospital Medicine Daily Progress Note    Date of Service  8/27/2022    Chief Complaint  Lorene Haro is a 46 y.o. female admitted 8/26/2022 with COVID-19 infection.    Hospital Course  Lorene Haro is a 46 y.o. female who presented 8/26/2022 with past medical history of morbid obesity, COPD, chronic diastolic heart failure, chronic hypoxia on 3 L at night recently diagnosed with COVID-19 4 days ago.  She was apparently sent home contracted hospital with dyspnea nonproductive cough requiring oxygen during the day she was satting at 93% on 2 L..  Patient referred to me for the care and management.  Patient denies any fever or chills.  She has shortness of breath and wheezing.  Weakness.  No fever or chills    Interval Problem Update  Patient endorsing shortness of breath. Currently no wheezing. She reports feeling tired. Procalcitonin was normal. D-Dimer ordered.     I have discussed this patient's plan of care and discharge plan at IDT rounds today with Case Management, Nursing, Nursing leadership, and other members of the IDT team.    Consultants/Specialty  None    Code Status  Full Code    Disposition  Patient is not medically cleared for discharge.   Anticipate discharge to to home with close outpatient follow-up.  I have placed the appropriate orders for post-discharge needs.    Review of Systems  Review of Systems   Constitutional:  Positive for malaise/fatigue. Negative for chills and fever.   Respiratory:  Positive for shortness of breath. Negative for wheezing.    Cardiovascular:  Negative for chest pain and palpitations.   Gastrointestinal:  Negative for abdominal pain, nausea and vomiting.   All other systems reviewed and are negative.     Physical Exam  Temp:  [36.1 °C (96.9 °F)-36.5 °C (97.7 °F)] 36.5 °C (97.7 °F)  Pulse:  [65-80] 76  Resp:  [14-42] 18  BP: (107-180)/(59-85) 129/61  SpO2:  [91 %-97 %] 95 %    Physical Exam  Vitals and nursing note reviewed.   Constitutional:       General: She  is sleeping. She is not in acute distress.     Appearance: She is obese. She is ill-appearing.   HENT:      Head: Normocephalic and atraumatic.   Eyes:      General: No scleral icterus.        Right eye: No discharge.         Left eye: No discharge.   Cardiovascular:      Rate and Rhythm: Normal rate and regular rhythm.      Heart sounds: Normal heart sounds.   Pulmonary:      Effort: No respiratory distress.      Breath sounds: No wheezing.      Comments: On 2L   Abdominal:      General: Bowel sounds are normal. There is no distension.      Tenderness: There is no abdominal tenderness. There is no guarding.   Musculoskeletal:         General: No tenderness.      Right lower leg: No edema.      Left lower leg: No edema.   Skin:     General: Skin is warm and dry.      Coloration: Skin is not jaundiced.   Neurological:      General: No focal deficit present.      Mental Status: She is easily aroused.      Cranial Nerves: No cranial nerve deficit.   Psychiatric:         Mood and Affect: Mood normal.         Behavior: Behavior normal.       Fluids    Intake/Output Summary (Last 24 hours) at 8/27/2022 1314  Last data filed at 8/27/2022 1106  Gross per 24 hour   Intake 960 ml   Output 2575 ml   Net -1615 ml       Laboratory  Recent Labs     08/26/22  0706 08/27/22  0340   WBC 11.8* 10.2   RBC 4.40 4.27   HEMOGLOBIN 11.7* 11.3*   HEMATOCRIT 37.9 37.6   MCV 86.1 88.1   MCH 26.6* 26.5*   MCHC 30.9* 30.1*   RDW 51.6* 53.9*   PLATELETCT 247 238   MPV 11.3 11.5     Recent Labs     08/26/22  0706 08/27/22  0340   SODIUM 140 139   POTASSIUM 3.8 4.7   CHLORIDE 107 104   CO2 24 29   GLUCOSE 92 130*   BUN 25* 28*   CREATININE 0.98 0.89   CALCIUM 8.7 8.4*                   Imaging  DX-CHEST-PORTABLE (1 VIEW)   Final Result         1.  Pulmonary edema and/or infiltrates.   2.  Cardiomegaly   3.  Atherosclerosis           Assessment/Plan  * Acute respiratory failure due to COVID-19 (HCC)- (present on admission)  Assessment &  Plan  COVID 19 vs Hypercapnia and KATHIA. Chronically on 2L per patient  ABG ordered  Respiratory isolation  Oxygen to keep saturation greater than 90%  IV Solu-Medrol daily  Rx with p.o. Zithromax for 3 days  Bronchodilators as needed  Diuresis with IV Lasix 20 mg every 12 hours    Chronic diastolic heart failure (HCC)- (present on admission)  Assessment & Plan  Diuresis with IV Lasix    Does not appear to be in acute exacerbation however diuresis is needed to stabilize patient's respiratory status    Obesity hypoventilation syndrome (HCC)- (present on admission)  Assessment & Plan  At baseline on 2L  ABG  Pulmonology consulted    HLD (hyperlipidemia)- (present on admission)  Assessment & Plan  Cont statin    Morbid obesity (HCC)- (present on admission)  Assessment & Plan  Weight loss and exercise recommended. Appears to have a history of obesity hypoventilation syndrome    Acute on chronic respiratory failure with hypoxia (HCC)- (present on admission)  Assessment & Plan  Due to COVID-19 and KATHIA and obesity hypoventilation syndrome    Patient feels approximately 3 L only at night    Follow treatment plan for COVID-19    COPD (chronic obstructive pulmonary disease) (HCC)- (present on admission)  Assessment & Plan  Acute exacerbation. Exacerbation appears resolved.    IV Solu-Medrol    Bronchodilators    Oxygen to maintain a sat greater than 90%    Patient is a full code       VTE prophylaxis: Xarelto 10 mg daily as prophylaxis    I have performed a physical exam and reviewed and updated ROS and Plan today (8/27/2022). In review of yesterday's note (8/26/2022), there are no changes except as documented above.

## 2022-08-27 NOTE — PROGRESS NOTES
Report received from night shift RN, assumed care of pt. Pt A&Ox4. Plan of care discussed with pt, labs and chart reviewed. All needs met at this time. Tele box on. On 2L O2 via nasal canula. Call light within reach, bed locked and in lowest position. All fall precautions and hourly rounding in place.

## 2022-08-27 NOTE — CARE PLAN
Problem: Bronchoconstriction  Goal: Improve in air movement and diminished wheezing  Description: Target End Date:  2 to 3 days    1.  Implement inhaled treatments  2.  Evaluate and manage medication effects  8/27/2022 1230 by Sammy Samuel, RRT  Flowsheets (Taken 8/27/2022 1000)  Bronchodilator Goals/Outcome: Patient at Stable Baseline  Bronchodilator Indications: History / Diagnosis  8/27/2022 1230 by Sammy Samuel, RRT  Outcome: Progressing         Respiratory Update    Treatment modality: DuoNeb + Flutter    Frequency: QID    Pt tolerating current treatments well with no adverse reactions.

## 2022-08-27 NOTE — ASSESSMENT & PLAN NOTE
Pt does have underlying KATHIA and obesity hypoventilation (paco2 elevated) with little reserve  Also likely underlying reactive airway disease but no evidence of COPD (no emphysema, PFTs shows response to BD with normal gas transfer)  Recommend inhaled budesonide for exacerbation of RAD in light of covid infection  Agree with Rx as asthma exacerbation  Will need follow up outpt to reassess for a sleep study for NIV for her underlying OHS as well

## 2022-08-27 NOTE — CONSULTS
Pulmonary Consultation    Date of consult: 8/27/2022    Referring Physician  Yanick Becerril D.O.    Reason for Consultation  Copd admission    History of Presenting Illness  46 y.o. female who presented 8/26/2022 with morbid obseity BMI 54, chronic diastolic heart failure,  paroxysmal afib, chronically on 3 l NC o2 at night , dx with COVID 19 5 days ago now presented with worsening SOB    Pt has no emphysema    PFTs 9/2021 shows mixed restrictive and obstrictive effect with normal gas transfer and response to BD        Pt has improved with diuresis and steroids.  No obvious bacterial infection    Code Status  Full Code    Review of Systems  Review of Systems   Constitutional:  Positive for malaise/fatigue.   HENT: Negative.     Eyes: Negative.    Respiratory:  Positive for cough, shortness of breath and wheezing.    Cardiovascular:  Positive for leg swelling.   Gastrointestinal: Negative.    Genitourinary: Negative.    Musculoskeletal: Negative.    Skin: Negative.    Neurological:  Positive for weakness.   Endo/Heme/Allergies: Negative.    Psychiatric/Behavioral: Negative.       Past Medical History   has a past medical history of A-fib ([facility]), Asthma, Bipolar 2 disorder ([facility]), Chickenpox, Chronic obstructive pulmonary disease ([facility]), Hypertension, On home oxygen therapy, Other psychological stress, Schizophrenic disorder ([facility]), and Yeast infection of the skin (4/1/2021).    She has no past medical history of CAD (coronary artery disease), Cancer ([facility]), Congestive heart failure ([facility]), Diabetes, Liver disease, Renal disorder, Seizure disorder ([facility]), or Stroke ([facility]).    Surgical History   has a past surgical history that includes hysterectomy laparoscopy; colectomy; cholecystectomy; and knee arthroscopy (Left).    Family History  family history includes Cancer in her sister; No Known Problems in her mother.    Social History   reports that she has quit smoking. She has never used smokeless tobacco. She reports that she  does not drink alcohol and does not use drugs.    Medications  Home Medications       Reviewed by Robbie Khanna (Pharmacy [facility]) on 08/26/22 at 0839  Med List Status: Complete     Medication Last Dose Status   aripiprazole (ABILIFY) 30 MG tablet UNK Active   atorvastatin (LIPITOR) 20 MG Tab UNK Active   azithromycin (ZITHROMAX) 250 MG Tab UNK Active   cephALEXin (KEFLEX) 500 MG Cap UNK Active   montelukast (SINGULAIR) 10 MG Tab UNK Active   nitroglycerin (NITROSTAT) 0.4 MG SL Tab UNK Active   predniSONE (DELTASONE) 10 MG Tab UNK Active   traZODone (DESYREL) 50 MG Tab UNK Active                  Current Facility-Administered Medications   Medication Dose Route Frequency Provider Last Rate Last Admin    ipratropium-albuterol (DUONEB) nebulizer solution  3 mL Nebulization 4X/DAY (RT) Yanick Becerril D.O.   3 mL at 08/27/22 1426    budesonide-formoterol (SYMBICORT) 160-4.5 MCG/ACT inhaler 2 Puff  2 Puff Inhalation BID (RT) Yanick Becerril D.O.        Respiratory Therapy Consult   Nebulization Continuous RT Yanick Becerril D.O.        furosemide (LASIX) injection 20 mg  20 mg Intravenous BID DIURETIC William Soria M.D.   20 mg at 08/27/22 1621    potassium chloride SA (Kdur) tablet 20 mEq  20 mEq Oral BID William Soria M.D.   20 mEq at 08/27/22 0755    ARIPiprazole (Abilify) tablet 30 mg  30 mg Oral QAM William Soria M.D.   30 mg at 08/27/22 0756    atorvastatin (LIPITOR) tablet 20 mg  20 mg Oral QHS William Soria M.D.   20 mg at 08/26/22 2109    montelukast (SINGULAIR) tablet 10 mg  10 mg Oral QHS William Soria M.D.   10 mg at 08/26/22 2110    traZODone (DESYREL) tablet 50 mg  50 mg Oral QHS William Soria M.D.   50 mg at 08/26/22 2109    ipratropium-albuterol (DUONEB) nebulizer solution  3 mL Nebulization Q2HRS PRN (RT) William Soria M.D.        methylPREDNISolone (SOLU-MEDROL) 40 MG injection 40 mg  40 mg Intravenous DAILY William Soria M.D.   40 mg at 08/27/22 0755    azithromycin  (ZITHROMAX) tablet 500 mg  500 mg Oral DAILY William Soria M.D.   500 mg at 08/27/22 0755    famotidine (PEPCID) tablet 20 mg  20 mg Oral BID William Soria M.D.   20 mg at 08/27/22 0755    senna-docusate (PERICOLACE or SENOKOT S) 8.6-50 MG per tablet 2 Tablet  2 Tablet Oral BID William Soria M.D.        And    polyethylene glycol/lytes (MIRALAX) PACKET 1 Packet  1 Packet Oral QDAY PRN William Soria M.D.        And    magnesium hydroxide (MILK OF MAGNESIA) suspension 30 mL  30 mL Oral QDAY PRN William Soria M.D.        And    bisacodyl (DULCOLAX) suppository 10 mg  10 mg Rectal QDAY PRN William Soria M.D.        rivaroxaban (XARELTO) tablet 10 mg  10 mg Oral DAILY AT 1800 William Soria M.D.   10 mg at 08/26/22 2110    ondansetron (ZOFRAN) syringe/vial injection 4 mg  4 mg Intravenous Q4HRS PRCORTNEY Soria M.D.        ondansetron (ZOFRAN ODT) dispertab 4 mg  4 mg Oral Q4HRS PRCORTNEY Soria M.D.        promethazine (PHENERGAN) tablet 12.5-25 mg  12.5-25 mg Oral Q4HRS PRCORTNEY Soria M.D.        promethazine (PHENERGAN) suppository 12.5-25 mg  12.5-25 mg Rectal Q4HRS PRCORTNEY Soria M.D.        prochlorperazine (COMPAZINE) injection 5-10 mg  5-10 mg Intravenous Q4HRS PRCORTNEY Soria M.D.        hydrALAZINE (APRESOLINE) injection 10 mg  10 mg Intravenous Q4HRS PRCORTNEY Soria M.D.        guaiFENesin dextromethorphan (ROBITUSSIN DM) 100-10 MG/5ML syrup 10 mL  10 mL Oral Q6HRS PRN William Soria M.D.   10 mL at 08/27/22 1621       Allergies  Allergies   Allergen Reactions    Penicillin G Rash and Itching     pt reports that she gets a rash all over her body and gets itchy    Amoxicillin Rash and Itching     Tolerates cephalosporins, pt reports that she gets a rash all over her body and gets itchy       Vital Signs last 24 hours  Temp:  [36.1 °C (96.9 °F)-36.5 °C (97.7 °F)] 36.4 °C (97.5 °F)  Pulse:  [67-84] 81  Resp:  [15-24] 18  BP: (107-129)/(59-68) 129/61  SpO2:  [93  %-97 %] 96 %    Physical Exam  Physical Exam    Fluids    Intake/Output Summary (Last 24 hours) at 2022 1702  Last data filed at 2022 1621  Gross per 24 hour   Intake 960 ml   Output 875 ml   Net 85 ml       Laboratory  Recent Results (from the past 48 hour(s))   EKG    Collection Time: 22  6:11 AM   Result Value Ref Range    Report       [facility] Emergency Dept.    Test Date:  2022  Pt Name:    ADELINA MILLAN                Department: ER  MRN:        [id number]                      Room:       [facility]  Gender:     Female                       Technician: [id number]  :        1975                   Requested By:ER TRIAGE PROTOCOL  Order #:    [id number]                    Reading MD: BECKY TIDWELL MD    Measurements  Intervals                                Axis  Rate:       70                           P:          91  DE:         112                          QRS:        68  QRSD:       106                          T:          14  QT:         407  QTc:        440    Interpretive Statements  Sinus rhythm With artifact  Atrial premature complexes  Borderline short DE interval  Probable left atrial enlargement  Low voltage, precordial leads    Compared to ECG 2022 00:42:50  Atrial premature complex(es) now present  Right ventricular hypertrophy now present  Electronically Signed On  022 6:29:58 PDT by BECKY TIDWELL MD     SARS-CoV-2, PCR (24 Hour In-House) Collect NP swab in [facility]    Collection Time: 22  6:32 AM    Specimen: Respirate   Result Value Ref Range    SARS-CoV-2 Source NP Swab     SARS-CoV-2 by PCR DETECTED (AA)    CBC with Differential    Collection Time: 22  7:06 AM   Result Value Ref Range    WBC 11.8 (H) 4.8 - 10.8 K/uL    RBC 4.40 4.20 - 5.40 M/uL    Hemoglobin 11.7 (L) 12.0 - 16.0 g/dL    Hematocrit 37.9 37.0 - 47.0 %    MCV 86.1 81.4 - 97.8 fL    MCH 26.6 (L) 27.0 - 33.0 pg    MCHC 30.9 (L) 33.6 - 35.0 g/dL    RDW 51.6 (H) 35.9 -  50.0 fL    Platelet Count 247 164 - 446 K/uL    MPV 11.3 9.0 - 12.9 fL    Neutrophils-Polys 70.40 44.00 - 72.00 %    Lymphocytes 20.30 (L) 22.00 - 41.00 %    Monocytes 7.10 0.00 - 13.40 %    Eosinophils 0.60 0.00 - 6.90 %    Basophils 0.20 0.00 - 1.80 %    Immature Granulocytes 1.40 (H) 0.00 - 0.90 %    Nucleated RBC 0.20 /100 WBC    Neutrophils (Absolute) 8.29 (H) 2.00 - 7.15 K/uL    Lymphs (Absolute) 2.39 1.00 - 4.80 K/uL    Monos (Absolute) 0.84 0.00 - 0.85 K/uL    Eos (Absolute) 0.07 0.00 - 0.51 K/uL    Baso (Absolute) 0.02 0.00 - 0.12 K/uL    Immature Granulocytes (abs) 0.17 (H) 0.00 - 0.11 K/uL    NRBC (Absolute) 0.02 K/uL   Comp Metabolic Panel    Collection Time: 08/26/22  7:06 AM   Result Value Ref Range    Sodium 140 135 - 145 mmol/L    Potassium 3.8 3.6 - 5.5 mmol/L    Chloride 107 96 - 112 mmol/L    Co2 24 20 - 33 mmol/L    Anion Gap 9.0 7.0 - 16.0    Glucose 92 65 - 99 mg/dL    Bun 25 (H) 8 - 22 mg/dL    Creatinine 0.98 0.50 - 1.40 mg/dL    Calcium 8.7 8.5 - 10.5 mg/dL    AST(SGOT) 17 12 - 45 U/L    ALT(SGPT) 44 2 - 50 U/L    Alkaline Phosphatase 110 (H) 30 - 99 U/L    Total Bilirubin 0.6 0.1 - 1.5 mg/dL    Albumin 4.0 3.2 - 4.9 g/dL    Total Protein 6.8 6.0 - 8.2 g/dL    Globulin 2.8 1.9 - 3.5 g/dL    A-G Ratio 1.4 g/dL   TROPONIN    Collection Time: 08/26/22  7:06 AM   Result Value Ref Range    Troponin T 25 (H) 6 - 19 ng/L   proBrain Natriuretic Peptide, NT    Collection Time: 08/26/22  7:06 AM   Result Value Ref Range    NT-proBNP 851 (H) 0 - 125 pg/mL   ESTIMATED GFR    Collection Time: 08/26/22  7:06 AM   Result Value Ref Range    GFR (CKD-EPI) 71 >60 mL/min/1.73 m 2   LACTIC ACID    Collection Time: 08/26/22  7:06 AM   Result Value Ref Range    Lactic Acid 1.5 0.5 - 2.0 mmol/L   PROCALCITONIN    Collection Time: 08/26/22  7:06 AM   Result Value Ref Range    Procalcitonin 0.07 <0.25 ng/mL   Blood Culture    Collection Time: 08/26/22  8:18 AM    Specimen: Peripheral; Blood   Result Value Ref Range     Significant Indicator NEG     Source BLD     Site PERIPHERAL     Culture Result       No Growth  Note: Blood cultures are incubated for 5 days and  are monitored continuously.Positive blood cultures  are called to the RN and reported as soon as  they are identified.     Blood Culture    Collection Time: 08/26/22  8:25 AM    Specimen: Peripheral; Blood   Result Value Ref Range    Significant Indicator NEG     Source BLD     Site PERIPHERAL     Culture Result       No Growth  Note: Blood cultures are incubated for 5 days and  are monitored continuously.Positive blood cultures  are called to the RN and reported as soon as  they are identified.     proBrain Natriuretic Peptide, NT    Collection Time: 08/27/22  3:40 AM   Result Value Ref Range    NT-proBNP 535 (H) 0 - 125 pg/mL   Basic Metabolic Panel    Collection Time: 08/27/22  3:40 AM   Result Value Ref Range    Sodium 139 135 - 145 mmol/L    Potassium 4.7 3.6 - 5.5 mmol/L    Chloride 104 96 - 112 mmol/L    Co2 29 20 - 33 mmol/L    Glucose 130 (H) 65 - 99 mg/dL    Bun 28 (H) 8 - 22 mg/dL    Creatinine 0.89 0.50 - 1.40 mg/dL    Calcium 8.4 (L) 8.5 - 10.5 mg/dL    Anion Gap 6.0 (L) 7.0 - 16.0   CBC without Differential    Collection Time: 08/27/22  3:40 AM   Result Value Ref Range    WBC 10.2 4.8 - 10.8 K/uL    RBC 4.27 4.20 - 5.40 M/uL    Hemoglobin 11.3 (L) 12.0 - 16.0 g/dL    Hematocrit 37.6 37.0 - 47.0 %    MCV 88.1 81.4 - 97.8 fL    MCH 26.5 (L) 27.0 - 33.0 pg    MCHC 30.1 (L) 33.6 - 35.0 g/dL    RDW 53.9 (H) 35.9 - 50.0 fL    Platelet Count 238 164 - 446 K/uL    MPV 11.5 9.0 - 12.9 fL   ESTIMATED GFR    Collection Time: 08/27/22  3:40 AM   Result Value Ref Range    GFR (CKD-EPI) 79 >60 mL/min/1.73 m 2   D-DIMER    Collection Time: 08/27/22  1:16 PM   Result Value Ref Range    D-Dimer Screen 0.27 0.00 - 0.50 ug/mL (FEU)   ABG - LAB    Collection Time: 08/27/22  1:16 PM   Result Value Ref Range    Ph 7.33 (L) 7.40 - 7.50    Pco2 60.2 (HH) 26.0 - 37.0 mmHg    Po2  56.8 (L) 64.0 - 87.0 mmHg    O2 Saturation 87.1 (L) 93.0 - 99.0 %    Hco3 31 (H) 17 - 25 mmol/L    Base Excess 4 (H) -4 - 3 mmol/L    Body Temp 36.1 Centigrade    Ph -TC 7.34 (L) 7.40 - 7.50    Pco2 -TC 57.9 (HH) 26.0 - 37.0 mmHg    Po2 -TC 53.4 (L) 64.0 - 87.0 mmHg       Imaging  Reviewed, 8/25/2022 cxr c/w pulm edema     Assessment/Plan  * Acute respiratory failure due to COVID-19 (HCC)- (present on admission)  Assessment & Plan  Pt does have underlying KATHIA and obesity hypoventilation (paco2 elevated) with little reserve  Also likely underlying reactive airway disease but no evidence of COPD (no emphysema, PFTs shows response to BD with normal gas transfer)  Recommend inhaled budesonide for exacerbation of RAD in light of covid infection  Agree with Rx as asthma exacerbation  Will need follow up outpt to reassess for a sleep study for NIV for her underlying OHS as well    COPD (chronic obstructive pulmonary disease) (HCC)- (present on admission)  Assessment & Plan  No evidence of COPD see note      D/w DR. Becerril

## 2022-08-27 NOTE — ASSESSMENT & PLAN NOTE
Due to COVID-19 and KATHIA and obesity hypoventilation syndrome  Patient feels approximately 3 L only at night  Follow treatment plan for COVID-19  Patient feeling slightly better today but now that she was started on BiPAP.  She reports difficulty obtaining her ordered BiPAP.  Pulmonology following.  Use BiPAP whenever asleep.

## 2022-08-27 NOTE — ASSESSMENT & PLAN NOTE
Diuresis with IV Lasix    Does not appear to be in acute exacerbation however diuresis is needed to stabilize patient's respiratory status

## 2022-08-27 NOTE — PROGRESS NOTES
Received Bedside report. Assumed care at 1900. This pt is AOx4, ambulatory with x1 assistance and four wheel walker, voiding via purewick, 5/10 pain. Patient and RN discussed plan of care: questions answered. Labs noted, assessment complete, patient tolerating regular diet. Tele box in place. Pt is on 2 L of O2 via nasal cannula. Call light in place, fall precautions in place, patient educated on importance of calling for assistance. No additional needs at this time. VSS

## 2022-08-27 NOTE — CARE PLAN
Problem: Knowledge Deficit - COPD  Goal: Patient/significant other demonstrates understanding of disease process, utilization of the Action Plan, medications and discharge instruction  Outcome: Progressing     Problem: Risk for Infection - COPD  Goal: Patient will remain free from signs and symptoms of infection  Outcome: Progressing     Problem: Self Care  Goal: Patient will have the ability to perform ADLs independently or with assistance (bathe, groom, dress, toilet and feed)  Outcome: Progressing   The patient is Stable - Low risk of patient condition declining or worsening    Shift Goals  Clinical Goals: wean O2, decrease cough and discomfort  Patient Goals: rest, decrease cough  Family Goals: ARAM.  No family    Progress made toward(s) clinical / shift goals:  Pt educated on POC, all questions answered in regards to disease process, treatment and diet. Pt verbalized understanding and voice no further questions at this time.

## 2022-08-27 NOTE — PROGRESS NOTES
Pt transferred to room 702 on zoll. Tele box in place, monitor room notified. Pt oriented to unit, reports no further needs at this time. Call light placed within reach.

## 2022-08-27 NOTE — RESPIRATORY CARE
"  COPD EDUCATION by COPD CLINICAL EDUCATOR  8/27/2022  at  11:08 AM by Maylin Sullivan, RRT     Patient interviewed by COPD education team.  Patient unable to participate in full program.  Our team has seen her multiple times with prior admissions.  We reviewed her respiratory medications and her sleep equipment.  She has our education packet. We reviewed her treatment plan and circumstances that lead to admission.  Unfortunately she is here with Covid.      COPD Screen  COPD Risk Screening  Do you have a history of COPD?: Yes    COPD Assessment  COPD Clinical Specialists ONLY  COPD Education Initiated: Yes--Short Intervention- IP Pulmonary notified pt not eligible for RPM program  DME Company: *[employer]-for BIPAP(AVAPS) prior A+  DME Equipment Type: AVAPS equipment; oxygen;nebulizer  Physician Name: LENNY:  SHILPA Sharp MD  Pulmonary Follow Up Appointment: 09/01/22 Appt Time: 1520 Pulmonologist -[facility] Team- Jose Eduardo ZAPIEN  Referrals Initiated: Yes  Pulmonary Rehab: Yes (by admitting team)  Smoking Cessation: No (quit)  Hospice: N/A  Home Health Care:  ([facility] prior to admit)  [facility] Outreach: N/A  [facility]: Yes (will send)  Is this a COPD exacerbation patient?: Yes  $ Demo/Eval of SVN's, MDI's and Aerosols: Yes    PFT Results  (OP) Pulmonary Function Testing: Yes 9/21/2021: FEV1 48; FEV1/FVC Ratio-86= mixed restrictive and obstructive + bronchodilator response    Meds to Beds  Would the patient like to opt in for Bedside Medication Delivery at Discharge?: Yes, interested     MY COPD ACTION PLAN     It is recommended that patients and physicians /healthcare providers complete this action plan together. This plan should be discussed at each physician visit and updated as needed.    The green, yellow and red zones show groups of symptoms of COPD. This list of symptoms is not comprehensive, and you may experience other symptoms. In the \"Actions\" column, your healthcare provider has recommended " "actions for you to take based on your symptoms.    Patient Name: Loreen Haro   YOB: 1975   Last Updated on: 7/23/2021  9:53 AM   Green Zone:  I am doing well today Actions     Usual activitiy and exercise level   Take daily medications     Usual amounts of cough and phlegm/mucus   Use oxygen as prescribed     Sleep well at night   Continue regular exercise/diet plan     Appetite is good   At all times avoid cigarette smoke, inhaled irritants     Daily Medications (these medications are taken every day):   Tiotropium Bromide Monohydrate (Spiriva)  Budesonide-Formoterol Fumarate (Symbicort) 2 Puffs  2 Puffs Once daily  Twice daily     Additional Information:  Use the spacer when taking the Symbicort, and rinse your mouth after taking    Yellow Zone:  I am having a bad day or a COPD flare Actions     More breathless than usual   Continue daily medications     I have less energy for my daily activities   Use quick relief inhaler as ordered     Increased or thicker phlegm/mucus   Use oxygen as prescribed     Using quick relief inhaler/nebulizer more often   Get plenty of rest     Swelling of ankles more than usual   Use pursed lip breathing     More coughing than usual   At all times avoid cigarette smoke, inhaled irritants     I feel like I have a \"chest cold\"     Poor sleep and my symptoms woke me up     My appetite is not good     My medicine is not helping      Call provider immediately if symptoms don’t improve     Continue daily medications, add rescue medications:   Albuterol/Ipratropium (Combivent, Duoneb)  Albuterol 3mL via nebulizer  2 Puffs Every 4 hours PRN  Every 4 hours PRN       Medications to be used during a flare up, (as Discussed with Provider):           Additional Information:  Use albuterol inhaler with spacer and rinse nebulizer per manufacturers recommendation    Red Zone:  I need urgent medical care Actions     Severe shortness of breath even at rest   Call 911 or seek " medical care immediately     Not able to do any activity because of breathing      Fever or shaking chills      Feeling confused or very drowsy       Chest pains      Coughing up blood

## 2022-08-27 NOTE — H&P
Hospital Medicine History & Physical Note    Date of Service  8/26/2022    Primary Care Physician  Pcp Pt States None    Consultants  none        Code Status  Full Code    Chief Complaint  Chief Complaint   Patient presents with    Shortness of Breath     Pt arrives via EMS with sob. Pt has inspiratory cp. H/x COPD       History of Presenting Illness  Lorene Haro is a 46 y.o. female who presented 8/26/2022 with past medical history of morbid obesity, COPD, chronic diastolic heart failure, chronic hypoxia on 3 L at night recently diagnosed with COVID-19 4 days ago.  She was apparently sent home contracted hospital with dyspnea nonproductive cough requiring oxygen during the day she was satting at 93% on 2 L..  Patient referred to me for the care and management.  Patient denies any fever or chills.  She has shortness of breath and wheezing.  Weakness.  No fever or chills    I discussed the plan of care with patient.    Review of Systems  Review of Systems   Constitutional:  Negative for chills and fever.   HENT: Negative.     Eyes: Negative.    Respiratory:  Positive for cough, shortness of breath and wheezing. Negative for sputum production.    Cardiovascular:  Negative for chest pain.   Gastrointestinal: Negative.    Genitourinary: Negative.    Musculoskeletal: Negative.    Skin: Negative.    Neurological: Negative.    Psychiatric/Behavioral: Negative.       Past Medical History   has a past medical history of A-fib (HCC), Asthma, Bipolar 2 disorder (HCC), Chickenpox, Chronic obstructive pulmonary disease (HCC), Hypertension, On home oxygen therapy, Other psychological stress, Schizophrenic disorder (HCC), and Yeast infection of the skin (4/1/2021).    Surgical History   has a past surgical history that includes hysterectomy laparoscopy; colectomy; cholecystectomy; and knee arthroscopy (Left).     Family History  family history includes Cancer in her sister; No Known Problems in her mother. .     Social  History   reports that she has quit smoking. She has never used smokeless tobacco. She reports that she does not drink alcohol and does not use drugs.    Allergies  Allergies   Allergen Reactions    Penicillin G Rash and Itching     pt reports that she gets a rash all over her body and gets itchy    Amoxicillin Rash and Itching     Tolerates cephalosporins, pt reports that she gets a rash all over her body and gets itchy       Medications  Prior to Admission Medications   Prescriptions Last Dose Informant Patient Reported? Taking?   aripiprazole (ABILIFY) 30 MG tablet UNK at [facility] Patient's Home Pharmacy Yes No   Sig: Take 30 mg by mouth every morning.   atorvastatin (LIPITOR) 20 MG Tab UNK at [facility] Patient's Home Pharmacy Yes No   Sig: Take 20 mg by mouth at bedtime.   azithromycin (ZITHROMAX) 250 MG Tab UNK at [facility] Patient's Home Pharmacy Yes Yes   Sig: Take 250-500 mg by mouth every day. Z-Romario   cephALEXin (KEFLEX) 500 MG Cap UNK at [facility] Patient's Home Pharmacy Yes Yes   Sig: Take 500 mg by mouth 4 times a day. 7 day course   montelukast (SINGULAIR) 10 MG Tab UNK at [facility] Patient's Home Pharmacy No No   Sig: TAKE 1 TABLET BY MOUTH EVERY NIGHT AT BEDTIME   Patient taking differently: Take 10 mg by mouth at bedtime.   nitroglycerin (NITROSTAT) 0.4 MG SL Tab UNK at [facility] Patient's Home Pharmacy No No   Sig: Place 1 tablet under the tongue as needed for Chest Pain.   predniSONE (DELTASONE) 10 MG Tab UNK at [facility] Patient's Home Pharmacy No No   Sig: Take 4 tabs (40 mg) po daily on days 1-3, then take 3 tabs (30 mg) po daily on days 4-6, then take 2 tabs (20 mg) po daily on days 7-9, then take 1 tab (10 mg) po daily on days 10-12, then take 1/2 tab (5 mg) po daily on days 13-15.   traZODone (DESYREL) 50 MG Tab UNK at [facility] Patient's Home Pharmacy Yes Yes   Sig: Take  mg by mouth every evening.      Facility-Administered Medications: None       Physical Exam  Temp:  [36.1 °C (96.9 °F)-36.2 °C (97.2 °F)] 36.1 °C (96.9  °F)  Pulse:  [62-83] 74  Resp:  [14-42] 24  BP: (107-180)/(58-85) 107/64  SpO2:  [91 %-100 %] 93 %  Blood Pressure: 107/64   Temperature: 36.1 °C (96.9 °F)   Pulse: 74   Respiration: (!) 24   Pulse Oximetry: 93 %       Physical Exam  Constitutional:       General: She is in acute distress.      Appearance: Normal appearance. She is obese. She is ill-appearing, toxic-appearing and diaphoretic.   Eyes:      Extraocular Movements: Extraocular movements intact.      Pupils: Pupils are equal, round, and reactive to light.   Cardiovascular:      Rate and Rhythm: Normal rate and regular rhythm.      Pulses: Normal pulses.   Pulmonary:      Effort: Respiratory distress present.      Breath sounds: Rhonchi present.   Abdominal:      General: There is distension.   Musculoskeletal:         General: Swelling present.      Cervical back: Normal range of motion.      Right lower leg: Edema present.      Left lower leg: Edema present.   Skin:     General: Skin is warm.   Neurological:      General: No focal deficit present.      Motor: Weakness present.   Psychiatric:         Mood and Affect: Mood normal.       Laboratory:  Recent Labs     08/26/22  0706   WBC 11.8*   RBC 4.40   HEMOGLOBIN 11.7*   HEMATOCRIT 37.9   MCV 86.1   MCH 26.6*   MCHC 30.9*   RDW 51.6*   PLATELETCT 247   MPV 11.3     Recent Labs     08/26/22  0706   SODIUM 140   POTASSIUM 3.8   CHLORIDE 107   CO2 24   GLUCOSE 92   BUN 25*   CREATININE 0.98   CALCIUM 8.7     Recent Labs     08/26/22  0706   ALTSGPT 44   ASTSGOT 17   ALKPHOSPHAT 110*   TBILIRUBIN 0.6   GLUCOSE 92         Recent Labs     08/26/22  0706   NTPROBNP 851*         Recent Labs     08/26/22  0706   TROPONINT 25*       Imaging:  DX-CHEST-PORTABLE (1 VIEW)   Final Result         1.  Pulmonary edema and/or infiltrates.   2.  Cardiomegaly   3.  Atherosclerosis              Assessment/Plan:  Justification for Admission Status  I anticipate this patient I anticipate this patient will require at least two  midnights for appropriate medical management, necessitating inpatient admission for evaluating and treament of pneumonia related to covid 19        Patient will need a Telemetry bed on TELEMETRY service .  The need is secondary to monitor for arrythmia    * Acute respiratory failure due to COVID-19 (HCC)- (present on admission)  Assessment & Plan  Respiratory isolation    Oxygen to keep saturation greater than 90%    IV Solu-Medrol daily        Rx with p.o. Zithromax for 3 days    Bronchodilators as needed    Diuresis with IV Lasix 20 mg every 12 hours    Chronic diastolic heart failure (HCC)- (present on admission)  Assessment & Plan  Diuresis with IV Lasix    Does not appear to be in acute exacerbation however diuresis is needed to stabilize patient's respiratory status    HLD (hyperlipidemia)- (present on admission)  Assessment & Plan  Cont statin    Morbid obesity (HCC)- (present on admission)  Assessment & Plan  Weight loss and exercise recommended    Acute on chronic respiratory failure with hypoxia ([facility])- (present on admission)  Assessment & Plan  Due to COVID-19    Patient feels approximately 3 L only at night    Follow treatment plan for COVID-19    COPD (chronic obstructive pulmonary disease) ([facility])- (present on admission)  Assessment & Plan  Acute exacerbation    IV Solu-Medrol    Bronchodilators    Oxygen to maintain a sat greater than 90%    Patient is a full code      VTE prophylaxis: enoxaparin ppx and Xarelto 10 mg daily as prophylaxis

## 2022-08-27 NOTE — ASSESSMENT & PLAN NOTE
COVID 19 vs Hypercapnia and KATHIA. Chronically on 2L per patient  ABG ordered  Respiratory isolation  Oxygen to keep saturation greater than 90%  IV Solu-Medrol daily  Rx with p.o. Zithromax for 3 days  Bronchodilators as needed  Diuresis with IV Lasix 20 mg every 12 hours

## 2022-08-28 PROBLEM — J45.901 REACTIVE AIRWAY DISEASE WITH ACUTE EXACERBATION: Status: ACTIVE | Noted: 2018-11-13

## 2022-08-28 LAB
ANION GAP SERPL CALC-SCNC: 6 MMOL/L (ref 7–16)
BUN SERPL-MCNC: 24 MG/DL (ref 8–22)
CALCIUM SERPL-MCNC: 8.9 MG/DL (ref 8.5–10.5)
CHLORIDE SERPL-SCNC: 99 MMOL/L (ref 96–112)
CO2 SERPL-SCNC: 33 MMOL/L (ref 20–33)
CREAT SERPL-MCNC: 0.82 MG/DL (ref 0.5–1.4)
ERYTHROCYTE [DISTWIDTH] IN BLOOD BY AUTOMATED COUNT: 53.2 FL (ref 35.9–50)
GFR SERPLBLD CREATININE-BSD FMLA CKD-EPI: 88 ML/MIN/1.73 M 2
GLUCOSE SERPL-MCNC: 136 MG/DL (ref 65–99)
HCT VFR BLD AUTO: 37.9 % (ref 37–47)
HGB BLD-MCNC: 11.3 G/DL (ref 12–16)
MCH RBC QN AUTO: 26.5 PG (ref 27–33)
MCHC RBC AUTO-ENTMCNC: 29.8 G/DL (ref 33.6–35)
MCV RBC AUTO: 88.8 FL (ref 81.4–97.8)
PLATELET # BLD AUTO: 251 K/UL (ref 164–446)
PMV BLD AUTO: 11.6 FL (ref 9–12.9)
POTASSIUM SERPL-SCNC: 4.3 MMOL/L (ref 3.6–5.5)
RBC # BLD AUTO: 4.27 M/UL (ref 4.2–5.4)
SODIUM SERPL-SCNC: 138 MMOL/L (ref 135–145)
WBC # BLD AUTO: 10.8 K/UL (ref 4.8–10.8)

## 2022-08-28 PROCEDURE — A9270 NON-COVERED ITEM OR SERVICE: HCPCS | Performed by: STUDENT IN AN ORGANIZED HEALTH CARE EDUCATION/TRAINING PROGRAM

## 2022-08-28 PROCEDURE — 80048 BASIC METABOLIC PNL TOTAL CA: CPT

## 2022-08-28 PROCEDURE — 94640 AIRWAY INHALATION TREATMENT: CPT

## 2022-08-28 PROCEDURE — A9270 NON-COVERED ITEM OR SERVICE: HCPCS | Performed by: HOSPITALIST

## 2022-08-28 PROCEDURE — 700111 HCHG RX REV CODE 636 W/ 250 OVERRIDE (IP): Performed by: HOSPITALIST

## 2022-08-28 PROCEDURE — 36415 COLL VENOUS BLD VENIPUNCTURE: CPT

## 2022-08-28 PROCEDURE — 85027 COMPLETE CBC AUTOMATED: CPT

## 2022-08-28 PROCEDURE — 94660 CPAP INITIATION&MGMT: CPT

## 2022-08-28 PROCEDURE — 700102 HCHG RX REV CODE 250 W/ 637 OVERRIDE(OP): Performed by: STUDENT IN AN ORGANIZED HEALTH CARE EDUCATION/TRAINING PROGRAM

## 2022-08-28 PROCEDURE — 700102 HCHG RX REV CODE 250 W/ 637 OVERRIDE(OP): Performed by: HOSPITALIST

## 2022-08-28 PROCEDURE — 700101 HCHG RX REV CODE 250: Performed by: HOSPITALIST

## 2022-08-28 PROCEDURE — 99233 SBSQ HOSP IP/OBS HIGH 50: CPT | Performed by: STUDENT IN AN ORGANIZED HEALTH CARE EDUCATION/TRAINING PROGRAM

## 2022-08-28 PROCEDURE — 770020 HCHG ROOM/CARE - TELE (206)

## 2022-08-28 PROCEDURE — 700101 HCHG RX REV CODE 250: Performed by: STUDENT IN AN ORGANIZED HEALTH CARE EDUCATION/TRAINING PROGRAM

## 2022-08-28 PROCEDURE — 94669 MECHANICAL CHEST WALL OSCILL: CPT

## 2022-08-28 PROCEDURE — 94760 N-INVAS EAR/PLS OXIMETRY 1: CPT

## 2022-08-28 RX ADMIN — FUROSEMIDE 20 MG: 10 INJECTION, SOLUTION INTRAMUSCULAR; INTRAVENOUS at 18:02

## 2022-08-28 RX ADMIN — IPRATROPIUM BROMIDE AND ALBUTEROL SULFATE 3 ML: 2.5; .5 SOLUTION RESPIRATORY (INHALATION) at 10:18

## 2022-08-28 RX ADMIN — ARIPIPRAZOLE 30 MG: 15 TABLET ORAL at 09:16

## 2022-08-28 RX ADMIN — MONTELUKAST 10 MG: 10 TABLET, FILM COATED ORAL at 20:26

## 2022-08-28 RX ADMIN — METHYLPREDNISOLONE SODIUM SUCCINATE 40 MG: 40 INJECTION, POWDER, FOR SOLUTION INTRAMUSCULAR; INTRAVENOUS at 05:16

## 2022-08-28 RX ADMIN — GUAIFENESIN SYRUP AND DEXTROMETHORPHAN 10 ML: 100; 10 SYRUP ORAL at 01:51

## 2022-08-28 RX ADMIN — AZITHROMYCIN MONOHYDRATE 500 MG: 250 TABLET ORAL at 09:18

## 2022-08-28 RX ADMIN — RIVAROXABAN 10 MG: 10 TABLET, FILM COATED ORAL at 18:00

## 2022-08-28 RX ADMIN — FAMOTIDINE 20 MG: 20 TABLET, FILM COATED ORAL at 09:19

## 2022-08-28 RX ADMIN — POTASSIUM CHLORIDE 20 MEQ: 1500 TABLET, EXTENDED RELEASE ORAL at 18:01

## 2022-08-28 RX ADMIN — IPRATROPIUM BROMIDE AND ALBUTEROL SULFATE 3 ML: 2.5; .5 SOLUTION RESPIRATORY (INHALATION) at 00:32

## 2022-08-28 RX ADMIN — FUROSEMIDE 20 MG: 10 INJECTION, SOLUTION INTRAMUSCULAR; INTRAVENOUS at 05:16

## 2022-08-28 RX ADMIN — ATORVASTATIN CALCIUM 20 MG: 20 TABLET, FILM COATED ORAL at 20:26

## 2022-08-28 RX ADMIN — FAMOTIDINE 20 MG: 20 TABLET, FILM COATED ORAL at 18:01

## 2022-08-28 RX ADMIN — BUDESONIDE AND FORMOTEROL FUMARATE DIHYDRATE 2 PUFF: 160; 4.5 AEROSOL RESPIRATORY (INHALATION) at 10:21

## 2022-08-28 RX ADMIN — POTASSIUM CHLORIDE 20 MEQ: 1500 TABLET, EXTENDED RELEASE ORAL at 09:19

## 2022-08-28 RX ADMIN — TRAZODONE HYDROCHLORIDE 50 MG: 50 TABLET ORAL at 20:26

## 2022-08-28 ASSESSMENT — ENCOUNTER SYMPTOMS
WHEEZING: 0
SHORTNESS OF BREATH: 1
VOMITING: 0
CHILLS: 0
PALPITATIONS: 0
FEVER: 0
NAUSEA: 0
ABDOMINAL PAIN: 0

## 2022-08-28 ASSESSMENT — FIBROSIS 4 INDEX: FIB4 SCORE: 0.49

## 2022-08-28 NOTE — PROGRESS NOTES
Hospital Medicine Daily Progress Note    Date of Service  8/28/2022    Chief Complaint  Lorene Haro is a 46 y.o. female admitted 8/26/2022 with COVID-19 infection.    Hospital Course  Lorene Haro is a 46 y.o. female who presented 8/26/2022 with past medical history of morbid obesity, COPD, chronic diastolic heart failure, chronic hypoxia on 3 L at night recently diagnosed with COVID-19 4 days ago.  She was apparently sent home contracted hospital with dyspnea nonproductive cough requiring oxygen during the day she was satting at 93% on 2 L..  Patient referred to me for the care and management.  Patient denies any fever or chills.  She has shortness of breath and wheezing.  Weakness.  No fever or chills    Interval Problem Update  Patient endorsing shortness of breath. Currently no wheezing. She reports feeling tired. Procalcitonin was normal. D-Dimer ordered.   8/28: Patient more alert today.  She was able to use the BiPAP [facility] through the night.  She still is complaining of wheezing.  Discussed with her the results of the old pulmonary function test, patient understands now that she has reactive airway disease.    I have discussed this patient's plan of care and discharge plan at IDT rounds today with Case Management, Nursing, Nursing leadership, and other members of the IDT team.    Consultants/Specialty  None    Code Status  Full Code    Disposition  Patient is not medically cleared for discharge.   Anticipate discharge to to home with close outpatient follow-up.  I have placed the appropriate orders for post-discharge needs.    Review of Systems  Review of Systems   Constitutional:  Positive for malaise/fatigue. Negative for chills and fever.   Respiratory:  Positive for shortness of breath. Negative for wheezing.    Cardiovascular:  Negative for chest pain and palpitations.   Gastrointestinal:  Negative for abdominal pain, nausea and vomiting.   All other systems reviewed and are negative.      Physical Exam  Temp:  [36.1 °C (96.9 °F)-36.9 °C (98.5 °F)] 36.1 °C (96.9 °F)  Pulse:  [60-86] 67  Resp:  [16-20] 18  BP: ()/(53-65) 103/53  SpO2:  [91 %-99 %] 95 %    Physical Exam  Vitals and nursing note reviewed.   Constitutional:       General: She is sleeping. She is not in acute distress.     Appearance: She is obese. She is ill-appearing.   HENT:      Head: Normocephalic and atraumatic.   Eyes:      General: No scleral icterus.        Right eye: No discharge.         Left eye: No discharge.   Cardiovascular:      Rate and Rhythm: Normal rate and regular rhythm.      Heart sounds: Normal heart sounds.   Pulmonary:      Effort: No respiratory distress.      Breath sounds: No wheezing.      Comments: On 2L   Abdominal:      General: Bowel sounds are normal. There is no distension.      Tenderness: There is no abdominal tenderness. There is no guarding.   Musculoskeletal:         General: No tenderness.      Right lower leg: No edema.      Left lower leg: No edema.   Skin:     General: Skin is warm and dry.      Coloration: Skin is not jaundiced.   Neurological:      General: No focal deficit present.      Mental Status: She is easily aroused.      Cranial Nerves: No cranial nerve deficit.   Psychiatric:         Mood and Affect: Mood normal.         Behavior: Behavior normal.       Fluids    Intake/Output Summary (Last 24 hours) at 8/28/2022 1227  Last data filed at 8/28/2022 0900  Gross per 24 hour   Intake 600 ml   Output 600 ml   Net 0 ml       Laboratory  Recent Labs     08/26/22  0706 08/27/22  0340 08/28/22  0022   WBC 11.8* 10.2 10.8   RBC 4.40 4.27 4.27   HEMOGLOBIN 11.7* 11.3* 11.3*   HEMATOCRIT 37.9 37.6 37.9   MCV 86.1 88.1 88.8   MCH 26.6* 26.5* 26.5*   MCHC 30.9* 30.1* 29.8*   RDW 51.6* 53.9* 53.2*   PLATELETCT 247 238 251   MPV 11.3 11.5 11.6     Recent Labs     08/26/22  0706 08/27/22  0340 08/28/22  0022   SODIUM 140 139 138   POTASSIUM 3.8 4.7 4.3   CHLORIDE 107 104 99   CO2 24 29 33    GLUCOSE 92 130* 136*   BUN 25* 28* 24*   CREATININE 0.98 0.89 0.82   CALCIUM 8.7 8.4* 8.9                   Imaging  DX-CHEST-PORTABLE (1 VIEW)   Final Result         1.  Pulmonary edema and/or infiltrates.   2.  Cardiomegaly   3.  Atherosclerosis           Assessment/Plan  * Acute respiratory failure due to COVID-19 (HCC)- (present on admission)  Assessment & Plan  COVID 19 vs Hypercapnia and KATHIA. Chronically on 2L per patient  ABG ordered  Respiratory isolation  Oxygen to keep saturation greater than 90%  IV Solu-Medrol daily  Rx with p.o. Zithromax for 3 days  Bronchodilators as needed  Diuresis with IV Lasix 20 mg every 12 hours    Chronic diastolic heart failure (HCC)- (present on admission)  Assessment & Plan  Diuresis with IV Lasix    Does not appear to be in acute exacerbation however diuresis is needed to stabilize patient's respiratory status    Obesity hypoventilation syndrome (HCC)- (present on admission)  Assessment & Plan  At baseline on 2L  ABG  Pulmonology consulted    HLD (hyperlipidemia)- (present on admission)  Assessment & Plan  Cont statin    Morbid obesity (HCC)- (present on admission)  Assessment & Plan  Weight loss and exercise recommended. Appears to have a history of obesity hypoventilation syndrome    Acute on chronic respiratory failure with hypercapnia (HCC)- (present on admission)  Assessment & Plan  Due to COVID-19 and KATHIA and obesity hypoventilation syndrome  Patient feels approximately 3 L only at night  Follow treatment plan for COVID-19  Patient feeling slightly better today but now that she was started on BiPAP.  She reports difficulty obtaining her ordered BiPAP.  Pulmonology following.  Use BiPAP whenever asleep.    Reactive airway disease with acute exacerbation- (present on admission)  Assessment & Plan  Acute exacerbation.  Exacerbation likely secondary to COVID infection  IV Solu-Medrol  Bronchodilators-DuoNebs, symbicort  Oxygen to maintain a sat greater than  90%  Pulmonology following       VTE prophylaxis: Xarelto 10 mg daily as prophylaxis    I have performed a physical exam and reviewed and updated ROS and Plan today (8/28/2022). In review of yesterday's note (8/27/2022), there are no changes except as documented above.

## 2022-08-28 NOTE — CARE PLAN
The patient is Watcher - Medium risk of patient condition declining or worsening    Shift Goals  Clinical Goals: Wean O2  Patient Goals: rest, decrease cough  Family Goals: ARAM.  No family    Progress made toward(s) clinical / shift goals:        Problem: Pain - Standard  Goal: Alleviation of pain or a reduction in pain to the patient’s comfort goal  Outcome: Progressing     Problem: Knowledge Deficit - Standard  Goal: Patient and family/care givers will demonstrate understanding of plan of care, disease process/condition, diagnostic tests and medications  Outcome: Progressing     Problem: Knowledge Deficit - COPD  Goal: Patient/significant other demonstrates understanding of disease process, utilization of the Action Plan, medications and discharge instruction  Outcome: Progressing     Problem: Self Care  Goal: Patient will have the ability to perform ADLs independently or with assistance (bathe, groom, dress, toilet and feed)  Outcome: Progressing       Patient is not progressing towards the following goals:

## 2022-08-28 NOTE — PROGRESS NOTES
Bedside report received from NOC RN. Assumed care of pt. Pt awake, laying in bed. A/Ox4, VSS. Pt is on 2L 02 via NC. No concerns, complaints or distress. Pt educated to call before getting out of bed. POC reviewed and white board updated. Tele box on. SB 57 on the monitor. Call light in reach. Bed locked in lowest position with 2 upper bed rails up. Bed alarm on.

## 2022-08-28 NOTE — CARE PLAN
Problem: Bronchoconstriction  Goal: Improve in air movement and diminished wheezing  Description: Target End Date:  2 to 3 days    1.  Implement inhaled treatments  2.  Evaluate and manage medication effects  Outcome: Progressing  Flowsheets (Taken 8/28/2022 1322)  Bronchodilator Goals/Outcome: Patient at Stable Baseline  Bronchodilator Indications: History / Diagnosis         Respiratory Update    Treatment modality: DuoNeb + Flutter    Frequency: PRN    Pt tolerating current treatments well with no adverse reactions.

## 2022-08-29 PROBLEM — R73.03 PREDIABETES: Status: ACTIVE | Noted: 2022-08-29

## 2022-08-29 LAB
ANION GAP SERPL CALC-SCNC: 8 MMOL/L (ref 7–16)
BUN SERPL-MCNC: 22 MG/DL (ref 8–22)
CALCIUM SERPL-MCNC: 9.2 MG/DL (ref 8.5–10.5)
CHLORIDE SERPL-SCNC: 99 MMOL/L (ref 96–112)
CO2 SERPL-SCNC: 33 MMOL/L (ref 20–33)
CREAT SERPL-MCNC: 0.85 MG/DL (ref 0.5–1.4)
ERYTHROCYTE [DISTWIDTH] IN BLOOD BY AUTOMATED COUNT: 54.2 FL (ref 35.9–50)
GFR SERPLBLD CREATININE-BSD FMLA CKD-EPI: 84 ML/MIN/1.73 M 2
GLUCOSE SERPL-MCNC: 91 MG/DL (ref 65–99)
HCT VFR BLD AUTO: 40.7 % (ref 37–47)
HGB BLD-MCNC: 12.1 G/DL (ref 12–16)
MCH RBC QN AUTO: 26.5 PG (ref 27–33)
MCHC RBC AUTO-ENTMCNC: 29.7 G/DL (ref 33.6–35)
MCV RBC AUTO: 89.3 FL (ref 81.4–97.8)
PLATELET # BLD AUTO: 242 K/UL (ref 164–446)
PMV BLD AUTO: 11.7 FL (ref 9–12.9)
POTASSIUM SERPL-SCNC: 4.7 MMOL/L (ref 3.6–5.5)
RBC # BLD AUTO: 4.56 M/UL (ref 4.2–5.4)
SODIUM SERPL-SCNC: 140 MMOL/L (ref 135–145)
WBC # BLD AUTO: 12.8 K/UL (ref 4.8–10.8)

## 2022-08-29 PROCEDURE — 770020 HCHG ROOM/CARE - TELE (206)

## 2022-08-29 PROCEDURE — A9270 NON-COVERED ITEM OR SERVICE: HCPCS | Performed by: HOSPITALIST

## 2022-08-29 PROCEDURE — 85027 COMPLETE CBC AUTOMATED: CPT

## 2022-08-29 PROCEDURE — 700102 HCHG RX REV CODE 250 W/ 637 OVERRIDE(OP): Performed by: HOSPITALIST

## 2022-08-29 PROCEDURE — 700111 HCHG RX REV CODE 636 W/ 250 OVERRIDE (IP): Performed by: HOSPITALIST

## 2022-08-29 PROCEDURE — 80048 BASIC METABOLIC PNL TOTAL CA: CPT

## 2022-08-29 PROCEDURE — 36415 COLL VENOUS BLD VENIPUNCTURE: CPT

## 2022-08-29 PROCEDURE — 99232 SBSQ HOSP IP/OBS MODERATE 35: CPT | Performed by: STUDENT IN AN ORGANIZED HEALTH CARE EDUCATION/TRAINING PROGRAM

## 2022-08-29 PROCEDURE — 94660 CPAP INITIATION&MGMT: CPT

## 2022-08-29 RX ADMIN — ARIPIPRAZOLE 30 MG: 15 TABLET ORAL at 04:57

## 2022-08-29 RX ADMIN — ATORVASTATIN CALCIUM 20 MG: 20 TABLET, FILM COATED ORAL at 20:04

## 2022-08-29 RX ADMIN — FUROSEMIDE 20 MG: 10 INJECTION, SOLUTION INTRAMUSCULAR; INTRAVENOUS at 17:12

## 2022-08-29 RX ADMIN — FAMOTIDINE 20 MG: 20 TABLET, FILM COATED ORAL at 05:19

## 2022-08-29 RX ADMIN — POTASSIUM CHLORIDE 20 MEQ: 1500 TABLET, EXTENDED RELEASE ORAL at 17:10

## 2022-08-29 RX ADMIN — TRAZODONE HYDROCHLORIDE 50 MG: 50 TABLET ORAL at 20:04

## 2022-08-29 RX ADMIN — MONTELUKAST 10 MG: 10 TABLET, FILM COATED ORAL at 20:04

## 2022-08-29 RX ADMIN — BUDESONIDE AND FORMOTEROL FUMARATE DIHYDRATE 2 PUFF: 160; 4.5 AEROSOL RESPIRATORY (INHALATION) at 20:04

## 2022-08-29 RX ADMIN — GUAIFENESIN SYRUP AND DEXTROMETHORPHAN 10 ML: 100; 10 SYRUP ORAL at 20:04

## 2022-08-29 RX ADMIN — METHYLPREDNISOLONE SODIUM SUCCINATE 40 MG: 40 INJECTION, POWDER, FOR SOLUTION INTRAMUSCULAR; INTRAVENOUS at 04:53

## 2022-08-29 RX ADMIN — POTASSIUM CHLORIDE 20 MEQ: 1500 TABLET, EXTENDED RELEASE ORAL at 04:52

## 2022-08-29 RX ADMIN — RIVAROXABAN 10 MG: 10 TABLET, FILM COATED ORAL at 17:10

## 2022-08-29 RX ADMIN — FAMOTIDINE 20 MG: 20 TABLET, FILM COATED ORAL at 17:10

## 2022-08-29 RX ADMIN — FUROSEMIDE 20 MG: 10 INJECTION, SOLUTION INTRAMUSCULAR; INTRAVENOUS at 04:52

## 2022-08-29 ASSESSMENT — ENCOUNTER SYMPTOMS
FEVER: 0
WHEEZING: 0
SHORTNESS OF BREATH: 1
PALPITATIONS: 0
COUGH: 1
SPUTUM PRODUCTION: 1
CHILLS: 0
ABDOMINAL PAIN: 0
NAUSEA: 0
VOMITING: 0

## 2022-08-29 ASSESSMENT — PAIN DESCRIPTION - PAIN TYPE: TYPE: ACUTE PAIN

## 2022-08-29 ASSESSMENT — FIBROSIS 4 INDEX: FIB4 SCORE: 0.52

## 2022-08-29 NOTE — CARE PLAN
The patient is Stable - Low risk of patient condition declining or worsening    Shift Goals  Clinical Goals: decrease 02 demand  Patient Goals: up to chair for meals  Family Goals: ARAM    Progress made toward(s) clinical / shift goals:  yes    Patient is not progressing towards the following goals:n/a    Problem: Knowledge Deficit - COPD  Goal: Patient/significant other demonstrates understanding of disease process, utilization of the Action Plan, medications and discharge instruction  Outcome: Progressing     Problem: Risk for Infection - COPD  Goal: Patient will remain free from signs and symptoms of infection  Outcome: Progressing     Problem: Impaired Gas Exchange  Goal: Patient will demonstrate improved ventilation and adequate oxygenation and participate in treatment regimen within the level of ability/situation.  Outcome: Progressing     Problem: Communication  Goal: The ability to communicate needs accurately and effectively will improve  Outcome: Progressing     Problem: Communication  Goal: The ability to communicate needs accurately and effectively will improve  Outcome: Progressing     Problem: Hemodynamics  Goal: Patient's hemodynamics, fluid balance and neurologic status will be stable or improve  Outcome: Progressing     Problem: Respiratory  Goal: Patient will achieve/maintain optimum respiratory ventilation and gas exchange  Outcome: Progressing     Problem: Urinary Elimination  Goal: Establish and maintain regular urinary output  Outcome: Progressing

## 2022-08-29 NOTE — DIETARY
NUTRITION SERVICES: BMI - Pt with BMI >40 (=Body mass index is 54.03 kg/m².), Class III obesity. Weight loss counseling not appropriate in acute care setting. RECOMMEND - If appropriate at DC please refer to outpatient nutrition services for weight management.

## 2022-08-29 NOTE — CARE PLAN
Problem: Pain - Standard  Goal: Alleviation of pain or a reduction in pain to the patient’s comfort goal  Outcome: Progressing     Problem: Knowledge Deficit - Standard  Goal: Patient and family/care givers will demonstrate understanding of plan of care, disease process/condition, diagnostic tests and medications  Outcome: Progressing     Problem: Knowledge Deficit - COPD  Goal: Patient/significant other demonstrates understanding of disease process, utilization of the Action Plan, medications and discharge instruction  Outcome: Progressing     Problem: Risk for Infection - COPD  Goal: Patient will remain free from signs and symptoms of infection  Outcome: Progressing     Problem: Nutrition - Advanced  Goal: Patient will display progressive weight gain toward goal have adequate food and fluid intake  Outcome: Progressing     Problem: Ineffective Airway Clearance  Goal: Patient will maintain patent airway with clear/clearing breath sounds  Outcome: Progressing     Problem: Impaired Gas Exchange  Goal: Patient will demonstrate improved ventilation and adequate oxygenation and participate in treatment regimen within the level of ability/situation.  Outcome: Progressing     Problem: Risk for Aspiration  Goal: Patient's risk for aspiration will be absent or decrease  Outcome: Progressing     Problem: Self Care  Goal: Patient will have the ability to perform ADLs independently or with assistance (bathe, groom, dress, toilet and feed)  Outcome: Progressing     Problem: Psychosocial  Goal: Patient's level of anxiety will decrease  Outcome: Progressing  Goal: Patient's ability to verbalize feelings about condition will improve  Outcome: Progressing  Goal: Patient's ability to re-evaluate and adapt role responsibilities will improve  Outcome: Progressing  Goal: Patient and family will demonstrate ability to cope with life altering diagnosis and/or procedure  Outcome: Progressing  Goal: Spiritual and cultural needs  incorporated into hospitalization  Outcome: Progressing     Problem: Communication  Goal: The ability to communicate needs accurately and effectively will improve  Outcome: Progressing     Problem: Discharge Barriers/Planning  Goal: Patient's continuum of care needs are met  Outcome: Progressing     Problem: Hemodynamics  Goal: Patient's hemodynamics, fluid balance and neurologic status will be stable or improve  Outcome: Progressing     Problem: Respiratory  Goal: Patient will achieve/maintain optimum respiratory ventilation and gas exchange  Outcome: Progressing     Problem: Chest Tube Management  Goal: Complications related to chest tube will be avoided or minimized  Outcome: Progressing     Problem: Fluid Volume  Goal: Fluid volume balance will be maintained  Outcome: Progressing     Problem: Mechanical Ventilation  Goal: Safe management of artificial airway and ventilation  Outcome: Progressing  Goal: Successful weaning off mechanical ventilator, spontaneously maintains adequate gas exchange  Outcome: Progressing  Goal: Patient will be able to express needs and understand communication  Outcome: Progressing     Problem: Dysphagia  Goal: Dysphagia will improve  Outcome: Progressing     Problem: Nutrition  Goal: Patient's nutritional and fluid intake will be adequate or improve  Outcome: Progressing  Goal: Enteral nutrition will be maintained or improve  Outcome: Progressing  Goal: Enteral nutrition will be maintained or improve  Outcome: Progressing     Problem: Urinary Elimination  Goal: Establish and maintain regular urinary output  Outcome: Progressing     Problem: Bowel Elimination  Goal: Establish and maintain regular bowel function  Outcome: Progressing     Problem: Gastrointestinal Irritability  Goal: Nausea and vomiting will be absent or improve  Outcome: Progressing  Goal: Diarrhea will be absent or improved  Outcome: Progressing     Problem: Rectal Tube  Goal: Fecal output will be contained and skin  will remain free from irritation  Outcome: Progressing     Problem: Mobility  Goal: Patient's capacity to carry out activities will improve  Outcome: Progressing     Problem: Infection - Standard  Goal: Patient will remain free from infection  Outcome: Progressing     Problem: Wound/ / Incision Healing  Goal: Patient's wound/surgical incision will decrease in size and heals properly  Outcome: Progressing     Problem: Fall Risk  Goal: Patient will remain free from falls  Outcome: Progressing   The patient is Stable - Low risk of patient condition declining or worsening      Shift Goals  Clinical Goals: wean down 02 as able; engage in adls; compliant with cpap  Patient Goals: get up to bathroom with assistance; continent of urine  Family Goals: ARAM    Progress made toward(s) clinical / shift goals:  Using call button to make needs known. Steady on her feet and using walker appropriately but a bit impulsive.    Patient is not progressing towards the following goals:

## 2022-08-29 NOTE — FACE TO FACE
"Face to Face Note  -  Durable Medical Equipment    Yanick Becerril D.O. - NPI: [id number]  I certify that this patient is under my care and that they had a durable medical equipment(DME)face to face encounter by myself that meets the physician DME face-to-face encounter requirements with this patient on:    Date of encounter:   Patient:                    MRN:                       YOB: 2024  Lorene Haro  [id number]  1975     The encounter with the patient was in whole, or in part, for the following medical condition, which is the primary reason for durable medical equipment:  Covid-19 Infection and Other - obesity hypoventilation syndrome, reactive airway disease    I certify that, based on my findings, the following durable medical equipment is medically necessary:    Oxygen   HOME O2 Saturation Measurements:(Values must be present for Home Oxygen orders)  Room air sat at rest: 90  Room air sat with amb: 82  With liters of O2: 2, O2 sat at rest with O2: 95  With Liters of O2: 2, O2 sat with amb with O2 : 93  Is the patient mobile?: Yes  If patient feels more short of breath, they can go up to 6 liters per minute and contact healthcare provider.    Supporting Symptoms: The patient requires supplemental oxygen, as the following interventions have been tried with limited or no improvement: \"Oral and/or IV steroids and \"Ambulation with oximetry.    My Clinical findings support the need for the above equipment due to:  Hypoxia  "

## 2022-08-29 NOTE — PROGRESS NOTES
Hospital Medicine Daily Progress Note    Date of Service  8/29/2022    Chief Complaint  Lorene Haro is a 46 y.o. female admitted 8/26/2022 with COVID-19 infection.    Hospital Course  Lorene Haro is a 46 y.o. female who presented 8/26/2022 with past medical history of morbid obesity, COPD, chronic diastolic heart failure, chronic hypoxia on 3 L at night recently diagnosed with COVID-19 4 days ago.  She was apparently sent home contracted hospital with dyspnea nonproductive cough requiring oxygen during the day she was satting at 93% on 2 L..  Patient referred to me for the care and management.  Patient denies any fever or chills.  She has shortness of breath and wheezing.  Weakness.  No fever or chills    Interval Problem Update  Patient endorsing shortness of breath. Currently no wheezing. She reports feeling tired. Procalcitonin was normal. D-Dimer ordered.   8/28: Patient more alert today.  She was able to use the BiPAP detention through the night.  She still is complaining of wheezing.  Discussed with her the results of the old pulmonary function test, patient understands now that she has reactive airway disease.  8/29: Patient reports feeling better with some persistent wheezing.  She does have a productive cough.  Orders for home O2 will be placed.    I have discussed this patient's plan of care and discharge plan at IDT rounds today with Case Management, Nursing, Nursing leadership, and other members of the IDT team.    Consultants/Specialty  None    Code Status  Full Code    Disposition  Patient is not medically cleared for discharge.   Anticipate discharge to to home with close outpatient follow-up.  I have placed the appropriate orders for post-discharge needs.    Review of Systems  Review of Systems   Constitutional:  Positive for malaise/fatigue. Negative for chills and fever.   Respiratory:  Positive for cough, sputum production and shortness of breath. Negative for wheezing.     Cardiovascular:  Negative for chest pain and palpitations.   Gastrointestinal:  Negative for abdominal pain, nausea and vomiting.   All other systems reviewed and are negative.     Physical Exam  Temp:  [36.1 °C (96.9 °F)-36.4 °C (97.5 °F)] 36.4 °C (97.5 °F)  Pulse:  [57-72] 66  Resp:  [18-24] 18  BP: (103-139)/(51-73) 124/73  SpO2:  [90 %-96 %] 96 %    Physical Exam  Vitals and nursing note reviewed.   Constitutional:       General: She is sleeping. She is not in acute distress.     Appearance: She is obese. She is ill-appearing.   HENT:      Head: Normocephalic and atraumatic.   Eyes:      General: No scleral icterus.        Right eye: No discharge.         Left eye: No discharge.   Cardiovascular:      Rate and Rhythm: Normal rate and regular rhythm.      Heart sounds: Normal heart sounds.   Pulmonary:      Effort: No respiratory distress.      Breath sounds: No wheezing.      Comments: On 2L   Abdominal:      General: Bowel sounds are normal. There is no distension.      Tenderness: There is no abdominal tenderness. There is no guarding.   Musculoskeletal:         General: No tenderness.      Right lower leg: No edema.      Left lower leg: No edema.   Skin:     General: Skin is warm and dry.      Coloration: Skin is not jaundiced.   Neurological:      General: No focal deficit present.      Mental Status: She is easily aroused.      Cranial Nerves: No cranial nerve deficit.   Psychiatric:         Mood and Affect: Mood normal.         Behavior: Behavior normal.       Fluids  No intake or output data in the 24 hours ending 08/29/22 1137      Laboratory  Recent Labs     08/27/22  0340 08/28/22  0022 08/29/22  0048   WBC 10.2 10.8 12.8*   RBC 4.27 4.27 4.56   HEMOGLOBIN 11.3* 11.3* 12.1   HEMATOCRIT 37.6 37.9 40.7   MCV 88.1 88.8 89.3   MCH 26.5* 26.5* 26.5*   MCHC 30.1* 29.8* 29.7*   RDW 53.9* 53.2* 54.2*   PLATELETCT 238 251 242   MPV 11.5 11.6 11.7       Recent Labs     08/27/22  0340 08/28/22  0022  08/29/22  0048   SODIUM 139 138 140   POTASSIUM 4.7 4.3 4.7   CHLORIDE 104 99 99   CO2 29 33 33   GLUCOSE 130* 136* 91   BUN 28* 24* 22   CREATININE 0.89 0.82 0.85   CALCIUM 8.4* 8.9 9.2                     Imaging  DX-CHEST-PORTABLE (1 VIEW)   Final Result         1.  Pulmonary edema and/or infiltrates.   2.  Cardiomegaly   3.  Atherosclerosis             Assessment/Plan  * Acute respiratory failure due to COVID-19 (HCC)- (present on admission)  Assessment & Plan  COVID 19 vs Hypercapnia and KATHIA. Chronically on 2L per patient  ABG ordered  Respiratory isolation  Oxygen to keep saturation greater than 90%  IV Solu-Medrol daily  Rx with p.o. Zithromax for 3 days  Bronchodilators as needed  Diuresis with IV Lasix 20 mg every 12 hours    Prediabetes  Assessment & Plan  a1c 6.0 in May  Diet exercise and weight loss.   Consider metformin on DC    Chronic diastolic heart failure (HCC)- (present on admission)  Assessment & Plan  Diuresis with IV Lasix    Does not appear to be in acute exacerbation however diuresis is needed to stabilize patient's respiratory status    Obesity hypoventilation syndrome (HCC)- (present on admission)  Assessment & Plan  At baseline on 2L  ABG  Pulmonology consulted    HLD (hyperlipidemia)- (present on admission)  Assessment & Plan  Cont statin    Morbid obesity (HCC)- (present on admission)  Assessment & Plan  Weight loss and exercise recommended. Appears to have a history of obesity hypoventilation syndrome    Acute on chronic respiratory failure with hypercapnia (HCC)- (present on admission)  Assessment & Plan  Due to COVID-19 and KATHIA and obesity hypoventilation syndrome  Patient feels approximately 3 L only at night  Follow treatment plan for COVID-19  Patient feeling slightly better today but now that she was started on BiPAP.  She reports difficulty obtaining her ordered BiPAP.  Pulmonology following.  Use BiPAP whenever asleep.    Reactive airway disease with acute exacerbation- (present  on admission)  Assessment & Plan  Acute exacerbation.  Exacerbation likely secondary to COVID infection  IV Solu-Medrol  Bronchodilators-DuoNebs, symbicort  Oxygen to maintain a sat greater than 90%  Pulmonology following         VTE prophylaxis: Xarelto 10 mg daily as prophylaxis    I have performed a physical exam and reviewed and updated ROS and Plan today (8/29/2022). In review of yesterday's note (8/28/2022), there are no changes except as documented above.

## 2022-08-29 NOTE — DOCUMENTATION QUERY
"                                                                         [facility]                                                                       Query Response Note      PATIENT:               ADELINA MILLAN  ACCT #:                  [id number]  MRN:                     [id number]  :                      1975  ADMIT DATE:       2022 5:58 AM  DISCH DATE:          RESPONDING  PROVIDER #:        [id number]           QUERY TEXT:    Pneumonia is documented in the medical record.  After review of documentation, please clarify findings.    Contact me with questions.     Thank you,  KOLBY Sol, CDI  [email or web address]      The patient's clinical indicators include:  45yo with dx of Covid19, acute and chronic respiratory failure with hypoxia and hypercapnia, chronic diastolic HF, morbid obesity.     ED note \"possible pneumonia\"    H&P \"treatment of pneumonia related to covid 19\"     CXR Impression: 1. Pulmonary edema and/or infiltrates; 2. Cardiomegaly; 3. Atherosclerosis   Procalcitonin 0.07     Risk factors: Covid 19, asthma with exacerbation, acute/chronic respiratory failure with hypoxia, morbid obesity with alveolar hypoventilation  Treatments: Medication, supplemental oxygen, inhalers, specialty consultation  Options provided:   -- Pneumonia related to Covid 19 is a relevant diagnosis   -- Pneumonia related to Covid 19 is not a relevant diagnosis   -- Other explanation:Other explanation-, please specify   -- Unable to determine      Query created by: Rama Clarke on 2022 4:47 AM    RESPONSE TEXT:    Other explanation:Other explanation- respiratory failure due to reactive airway disease, and COVID 19. Pneumonia not present          Electronically signed by:  AMANDA DENT MD 2022 8:06 AM              "

## 2022-08-29 NOTE — DISCHARGE PLANNING
Case Management Discharge Planning    Admission Date: 8/26/2022  GMLOS: 3  ALOS: 3    6-Clicks ADL Score: 22  6-Clicks Mobility Score: 20      Anticipated Discharge Dispo: Discharge Disposition: Discharged to home/self care (01)    DME Needed: Yes    DME Ordered: Yes    Action(s) Taken: RN CM met with the patient at bedside to obtain DME choice for home oxygen.  Patient was asleep when RN CM arrived.  RN NEREIDA attempted to wake the patient, patient groaned and refused to participate in conversation.  RN NEREIDA notified bedside JC Duran.    Escalations Completed: Bedside RN    Medically Clear: No    Next Steps: Case coordination to follow up with the patient in the morning to obtain DME choice.    Barriers to Discharge: Medical clearance and DME    Is the patient up for discharge tomorrow: Yes    Is transport arranged for discharge disposition: No

## 2022-08-29 NOTE — CARE PLAN
The patient is Stable - Low risk of patient condition declining or worsening    Shift Goals  Clinical Goals: decrease 02 demand  Patient Goals: rest, comfort  Family Goals: ARAM.  No family    Progress made toward(s) clinical / shift goals:  yes    Patient is not progressing towards the following goals:n/a    Problem: Knowledge Deficit - COPD  Goal: Patient/significant other demonstrates understanding of disease process, utilization of the Action Plan, medications and discharge instruction  Outcome: Progressing     Problem: Impaired Gas Exchange  Goal: Patient will demonstrate improved ventilation and adequate oxygenation and participate in treatment regimen within the level of ability/situation.  Outcome: Progressing     Problem: Psychosocial  Goal: Patient's level of anxiety will decrease  Outcome: Progressing

## 2022-08-29 NOTE — PROGRESS NOTES
Bedside report received from NOC RN. Assumed care of pt. Pt awake, laying in bed. A/Ox4, VSS. No concerns, complaints or distress. Pt educated to call before getting out of bed. POC reviewed and white board updated. Tele box on. SR 69 on the monitor. Call light in reach. Bed locked in lowest position with 2 upper bed rails up. Bed alarm on.

## 2022-08-30 ENCOUNTER — PHARMACY VISIT (OUTPATIENT)
Dept: PHARMACY | Facility: MEDICAL CENTER | Age: 47
End: 2022-08-30
Payer: COMMERCIAL

## 2022-08-30 VITALS
TEMPERATURE: 98.7 F | HEIGHT: 62 IN | BODY MASS INDEX: 53.92 KG/M2 | OXYGEN SATURATION: 96 % | HEART RATE: 59 BPM | SYSTOLIC BLOOD PRESSURE: 122 MMHG | RESPIRATION RATE: 20 BRPM | DIASTOLIC BLOOD PRESSURE: 70 MMHG | WEIGHT: 293 LBS

## 2022-08-30 LAB
ANION GAP SERPL CALC-SCNC: 8 MMOL/L (ref 7–16)
BUN SERPL-MCNC: 24 MG/DL (ref 8–22)
CALCIUM SERPL-MCNC: 9.3 MG/DL (ref 8.5–10.5)
CHLORIDE SERPL-SCNC: 96 MMOL/L (ref 96–112)
CO2 SERPL-SCNC: 35 MMOL/L (ref 20–33)
CREAT SERPL-MCNC: 0.85 MG/DL (ref 0.5–1.4)
ERYTHROCYTE [DISTWIDTH] IN BLOOD BY AUTOMATED COUNT: 52.3 FL (ref 35.9–50)
GFR SERPLBLD CREATININE-BSD FMLA CKD-EPI: 84 ML/MIN/1.73 M 2
GLUCOSE SERPL-MCNC: 112 MG/DL (ref 65–99)
HCT VFR BLD AUTO: 39.7 % (ref 37–47)
HGB BLD-MCNC: 12 G/DL (ref 12–16)
MCH RBC QN AUTO: 26.4 PG (ref 27–33)
MCHC RBC AUTO-ENTMCNC: 30.2 G/DL (ref 33.6–35)
MCV RBC AUTO: 87.3 FL (ref 81.4–97.8)
PLATELET # BLD AUTO: 278 K/UL (ref 164–446)
PMV BLD AUTO: 11.9 FL (ref 9–12.9)
POTASSIUM SERPL-SCNC: 4.6 MMOL/L (ref 3.6–5.5)
RBC # BLD AUTO: 4.55 M/UL (ref 4.2–5.4)
SODIUM SERPL-SCNC: 139 MMOL/L (ref 135–145)
WBC # BLD AUTO: 12 K/UL (ref 4.8–10.8)

## 2022-08-30 PROCEDURE — 99239 HOSP IP/OBS DSCHRG MGMT >30: CPT | Performed by: STUDENT IN AN ORGANIZED HEALTH CARE EDUCATION/TRAINING PROGRAM

## 2022-08-30 PROCEDURE — 94660 CPAP INITIATION&MGMT: CPT

## 2022-08-30 PROCEDURE — 700102 HCHG RX REV CODE 250 W/ 637 OVERRIDE(OP): Performed by: HOSPITALIST

## 2022-08-30 PROCEDURE — 80048 BASIC METABOLIC PNL TOTAL CA: CPT

## 2022-08-30 PROCEDURE — A9270 NON-COVERED ITEM OR SERVICE: HCPCS | Performed by: STUDENT IN AN ORGANIZED HEALTH CARE EDUCATION/TRAINING PROGRAM

## 2022-08-30 PROCEDURE — RXMED WILLOW AMBULATORY MEDICATION CHARGE: Performed by: STUDENT IN AN ORGANIZED HEALTH CARE EDUCATION/TRAINING PROGRAM

## 2022-08-30 PROCEDURE — 36415 COLL VENOUS BLD VENIPUNCTURE: CPT

## 2022-08-30 PROCEDURE — 94640 AIRWAY INHALATION TREATMENT: CPT

## 2022-08-30 PROCEDURE — 700111 HCHG RX REV CODE 636 W/ 250 OVERRIDE (IP): Performed by: HOSPITALIST

## 2022-08-30 PROCEDURE — 85027 COMPLETE CBC AUTOMATED: CPT

## 2022-08-30 PROCEDURE — A9270 NON-COVERED ITEM OR SERVICE: HCPCS | Performed by: HOSPITALIST

## 2022-08-30 PROCEDURE — 700102 HCHG RX REV CODE 250 W/ 637 OVERRIDE(OP): Performed by: STUDENT IN AN ORGANIZED HEALTH CARE EDUCATION/TRAINING PROGRAM

## 2022-08-30 RX ORDER — MONTELUKAST SODIUM 10 MG/1
10 TABLET ORAL
Qty: 30 TABLET | Refills: 3 | Status: SHIPPED | OUTPATIENT
Start: 2022-08-30 | End: 2022-09-29

## 2022-08-30 RX ORDER — TRAZODONE HYDROCHLORIDE 50 MG/1
50 TABLET ORAL
Qty: 30 TABLET | Refills: 3 | Status: SHIPPED | OUTPATIENT
Start: 2022-08-30 | End: 2023-08-01

## 2022-08-30 RX ORDER — BUDESONIDE AND FORMOTEROL FUMARATE DIHYDRATE 160; 4.5 UG/1; UG/1
2 AEROSOL RESPIRATORY (INHALATION) 2 TIMES DAILY
Qty: 6 G | Refills: 3 | Status: SHIPPED | OUTPATIENT
Start: 2022-08-30 | End: 2022-09-29

## 2022-08-30 RX ORDER — ACETAMINOPHEN 500 MG
500 TABLET ORAL EVERY 6 HOURS PRN
Status: DISCONTINUED | OUTPATIENT
Start: 2022-08-30 | End: 2022-08-30 | Stop reason: HOSPADM

## 2022-08-30 RX ORDER — FAMOTIDINE 20 MG/1
20 TABLET, FILM COATED ORAL 2 TIMES DAILY
Qty: 60 TABLET | Refills: 0 | Status: SHIPPED | OUTPATIENT
Start: 2022-08-30 | End: 2022-10-04

## 2022-08-30 RX ADMIN — POTASSIUM CHLORIDE 20 MEQ: 1500 TABLET, EXTENDED RELEASE ORAL at 05:10

## 2022-08-30 RX ADMIN — METHYLPREDNISOLONE SODIUM SUCCINATE 40 MG: 40 INJECTION, POWDER, FOR SOLUTION INTRAMUSCULAR; INTRAVENOUS at 05:10

## 2022-08-30 RX ADMIN — BUDESONIDE AND FORMOTEROL FUMARATE DIHYDRATE 2 PUFF: 160; 4.5 AEROSOL RESPIRATORY (INHALATION) at 07:03

## 2022-08-30 RX ADMIN — FUROSEMIDE 20 MG: 10 INJECTION, SOLUTION INTRAMUSCULAR; INTRAVENOUS at 05:10

## 2022-08-30 RX ADMIN — ACETAMINOPHEN 500 MG: 500 TABLET ORAL at 08:19

## 2022-08-30 RX ADMIN — FAMOTIDINE 20 MG: 20 TABLET, FILM COATED ORAL at 05:10

## 2022-08-30 RX ADMIN — GUAIFENESIN SYRUP AND DEXTROMETHORPHAN 10 ML: 100; 10 SYRUP ORAL at 08:12

## 2022-08-30 RX ADMIN — ARIPIPRAZOLE 30 MG: 15 TABLET ORAL at 05:11

## 2022-08-30 RX ADMIN — BUDESONIDE AND FORMOTEROL FUMARATE DIHYDRATE 2 PUFF: 160; 4.5 AEROSOL RESPIRATORY (INHALATION) at 07:04

## 2022-08-30 ASSESSMENT — PAIN DESCRIPTION - PAIN TYPE: TYPE: ACUTE PAIN

## 2022-08-30 NOTE — DISCHARGE SUMMARY
Discharge Summary    CHIEF COMPLAINT ON ADMISSION  Chief Complaint   Patient presents with    Shortness of Breath     Pt arrives via EMS with sob. Pt has inspiratory cp. H/x COPD       Reason for Admission  ems     Admission Date  8/26/2022    CODE STATUS  Prior    HPI & HOSPITAL COURSE  This is a 47 y.o. female here with shortness of breath  Lorene Haro is a 46 y.o. female who presented 8/26/2022 with past medical history of morbid obesity, COPD, chronic diastolic heart failure, chronic hypoxia on 3 L at night recently diagnosed with COVID-19 4 days ago.  She was apparently sent home contracted hospital with dyspnea nonproductive cough requiring oxygen during the day she was satting at 93% on 2 L..  Patient referred to me for the care and management.  Patient denies any fever or chills.  She has shortness of breath and wheezing.  Weakness.  No fever or chills  On day of discharge patient underwent supplemental home O2 test and it was determined patient did not require supplemental oxygen patient was maintaining saturation rhythm 88% while ambulating on pulse oximetry.  Patient had already taken several days of antimicrobials while admitted.  Patient will continue on her home COPD medications in addition to her psychiatric medications.  Patient will follow up with her primary care provider.  Therefore, she is discharged in good and stable condition to home with close outpatient follow-up.    The patient met 2-midnight criteria for an inpatient stay at the time of discharge.    Discharge Date  8/30/2022    FOLLOW UP ITEMS POST DISCHARGE  Take all medication as prescribed  Go to all follow-up appointments as indicated    DISCHARGE DIAGNOSES  Principal Problem:    Acute respiratory failure due to COVID-19 (HCC) POA: Yes  Active Problems:    Reactive airway disease with acute exacerbation POA: Yes    Acute on chronic respiratory failure with hypercapnia (HCC) POA: Yes    Morbid obesity (HCC) POA: Yes    HLD  (hyperlipidemia) POA: Yes    Obesity hypoventilation syndrome (HCC) POA: Yes    Chronic diastolic heart failure (HCC) POA: Yes    Prediabetes POA: Unknown  Resolved Problems:    * No resolved hospital problems. *      FOLLOW UP  Future Appointments   Date Time Provider Department Center   9/1/2022  3:20 PM Jessica Whitfield P.A.-C. [facility] None   11/17/2022  1:00 PM Oz Borrego M.D. [facility] None     No follow-up provider specified.    MEDICATIONS ON DISCHARGE     Medication List        START taking these medications        Instructions   famotidine 20 MG Tabs  Commonly known as: PEPCID   Take 1 Tablet by mouth 2 times a day.  Dose: 20 mg     Symbicort 160-4.5 MCG/ACT Aero  Generic drug: budesonide-formoterol   Inhale 2 Puffs 2 times a day for 30 days.  Dose: 2 Puff            CHANGE how you take these medications        Instructions   traZODone 50 MG Tabs  What changed:   how much to take  when to take this  Commonly known as: DESYREL   Take 1 Tablet by mouth at bedtime.  Dose: 50 mg            CONTINUE taking these medications        Instructions   aripiprazole 30 MG tablet  Commonly known as: ABILIFY   Take 30 mg by mouth every morning.  Dose: 30 mg     atorvastatin 20 MG Tabs  Commonly known as: LIPITOR   Take 20 mg by mouth at bedtime.  Dose: 20 mg     montelukast 10 MG Tabs  Commonly known as: SINGULAIR   Take 1 Tablet by mouth at bedtime for 30 days.  Dose: 10 mg     nitroglycerin 0.4 MG Subl  Commonly known as: NITROSTAT   Place 1 tablet under the tongue as needed for Chest Pain.  Dose: 0.4 mg            STOP taking these medications      azithromycin 250 MG Tabs  Commonly known as: ZITHROMAX     cephALEXin 500 MG Caps  Commonly known as: KEFLEX     predniSONE 10 MG Tabs  Commonly known as: DELTASONE              Allergies  Allergies   Allergen Reactions    Penicillin G Rash and Itching     pt reports that she gets a rash all over her body and gets itchy    Amoxicillin Rash and Itching     Tolerates cephalosporins,  pt reports that she gets a rash all over her body and gets itchy       DIET  No orders of the defined types were placed in this encounter.      ACTIVITY  As tolerated.  Weight bearing as tolerated    CONSULTATIONS  Pulmonology    PROCEDURES  None    LABORATORY  Lab Results   Component Value Date    SODIUM 139 08/30/2022    POTASSIUM 4.6 08/30/2022    CHLORIDE 96 08/30/2022    CO2 35 (H) 08/30/2022    GLUCOSE 112 (H) 08/30/2022    BUN 24 (H) 08/30/2022    CREATININE 0.85 08/30/2022    CREATININE 0.9 09/11/2008        Lab Results   Component Value Date    WBC 12.0 (H) 08/30/2022    HEMOGLOBIN 12.0 08/30/2022    HEMATOCRIT 39.7 08/30/2022    PLATELETCT 278 08/30/2022      Please note that this dictation was created using voice recognition software. I have made every reasonable attempt to correct obvious errors, but I expect that there are errors of grammar and possibly context that I did not discover before finalizing the note.    Total time of the discharge process exceeds 35 minutes.

## 2022-08-30 NOTE — PROGRESS NOTES
Monitor summary:        Rhythm: SB-SR  Rate: 58-64  Ectopy: (R)PVC  Measurements: 16./.06/.40        12hr chart check

## 2022-08-30 NOTE — PROGRESS NOTES
Patient discharged to home. Upon discharge, patient is alert & oriented x4 and ambulatory with four-wheel walker. PIV removed. Telemetry monitor removed. Belongings in patient's possession. Discharge instructions given to and explained - patient verbalizes understanding. Patient transported via wheelchair to main entrance and assisted into a cab with a cab voucher.

## 2022-08-30 NOTE — PROGRESS NOTES
Monitor Summary    Rhythm: SB/SR  Rate: 54-76  Ectopy: R PVC, R bigem  Measurement: .15/.10/.38

## 2022-08-30 NOTE — HOSPITAL COURSE
Lorene Haro is a 46 y.o. female who presented 8/26/2022 with past medical history of morbid obesity, COPD, chronic diastolic heart failure, chronic hypoxia on 3 L at night recently diagnosed with COVID-19 4 days ago.  She was apparently sent home contracted hospital with dyspnea nonproductive cough requiring oxygen during the day she was satting at 93% on 2 L..  Patient referred to me for the care and management.  Patient denies any fever or chills.  She has shortness of breath and wheezing.  Weakness.  No fever or chills

## 2022-08-30 NOTE — PROGRESS NOTES
Received report and assumed care of patient. Patient is alert and oriented x4. Patient is in no signs of distress. Patient was updated on the plan of care for the day. Call light within reach, bed in low position, 2 side rails up. All fall precautions in place.

## 2022-08-30 NOTE — DISCHARGE PLANNING
Care Transition Team Assessment      Assessment completed via chart review. DPOA's per ACP documents are Brittney Vance and Kylah Shaffer. Pt is connected to [facility] for an intellectual disability. Pt receives case management and personal care services through [facility] and her  is Lavern ([phone]). Pt is not under guardianship. [facility] [place]    Information Source  Orientation Level: Oriented X4  Who is responsible for making decisions for patient? : Patient    Readmission Evaluation  Is this a readmission?: No    Elopement Risk  Legal Hold: No  Ambulatory or Self Mobile in Wheelchair: Yes  Disoriented: No  Psychiatric Symptoms: None  History of Wandering: No  Elopement this Admit: No  Vocalizing Wanting to Leave: No  Displays Behaviors, Body Language Wanting to Leave: No-Not at Risk for Elopement  Elopement Risk: Not at Risk for Elopement         Discharge Preparedness  What is your plan after discharge?: Home with help  What are your discharge supports?: Sibling, Parent  Prior Functional Level: Ambulatory, Independent with Activities of Daily Living    Functional Assesment  Prior Functional Level: Ambulatory, Independent with Activities of Daily Living    Finances  Financial Barriers to Discharge: No  Prescription Coverage: Yes              Advance Directive  Advance Directive?: Living Will, DPOA for Health Care  Durable Power of  Name and Contact : Rosa Isela Vance or Kylah Shaffer    Domestic Abuse  Have you ever been the victim of abuse or violence?: No    Psychological Assessment  History of Substance Abuse: None  History of Psychiatric Problems: No  Non-compliant with Treatment: No  Newly Diagnosed Illness: No    Discharge Risks or Barriers  Discharge risks or barriers?: Complex medical needs, Other (comment) (intellectual disability)  Patient risk factors: Lack of outside supports, Cognitive / sensory / physical deficit    Anticipated  Discharge Information  Discharge Disposition: Discharged to home/self care (01)

## 2022-08-31 ENCOUNTER — PATIENT OUTREACH (OUTPATIENT)
Dept: HEALTH INFORMATION MANAGEMENT | Facility: OTHER | Age: 47
End: 2022-08-31

## 2022-08-31 NOTE — PROGRESS NOTES
Community [facility] Worker Bob attempted to reach the patient after discharge from the hospital to follow up. Patient did not answer and CHW left a detailed voicemail requesting a call back.     [facility] Oscar Rojas will attempt again at a later date.

## 2022-09-01 ENCOUNTER — TELEPHONE (OUTPATIENT)
Dept: CARDIOLOGY | Facility: MEDICAL CENTER | Age: 47
End: 2022-09-01
Payer: MEDICAID

## 2022-09-01 DIAGNOSIS — I27.20 PULMONARY HYPERTENSION (HCC): ICD-10-CM

## 2022-09-01 DIAGNOSIS — I50.32 CHRONIC DIASTOLIC HEART FAILURE (HCC): ICD-10-CM

## 2022-09-01 DIAGNOSIS — I48.0 PAROXYSMAL ATRIAL FIBRILLATION (HCC): ICD-10-CM

## 2022-09-01 DIAGNOSIS — I49.3 PVC'S (PREMATURE VENTRICULAR CONTRACTIONS): ICD-10-CM

## 2022-09-01 DIAGNOSIS — Z95.818 STATUS POST PLACEMENT OF IMPLANTABLE LOOP RECORDER: ICD-10-CM

## 2022-09-01 NOTE — TELEPHONE ENCOUNTER
Called pt [phone]  No answer, left VM to call [phone]  Regarding loop recorder transmission

## 2022-09-01 NOTE — TELEPHONE ENCOUNTER
Received notification 09/01 of episode on 08/22 at 7:20 am with 7 second pause.         To be scanned for review.

## 2022-09-02 ENCOUNTER — APPOINTMENT (OUTPATIENT)
Dept: RADIOLOGY | Facility: MEDICAL CENTER | Age: 47
End: 2022-09-02
Attending: HOSPITALIST
Payer: MEDICAID

## 2022-09-02 ENCOUNTER — APPOINTMENT (OUTPATIENT)
Dept: RADIOLOGY | Facility: MEDICAL CENTER | Age: 47
End: 2022-09-02
Attending: EMERGENCY MEDICINE
Payer: MEDICAID

## 2022-09-02 ENCOUNTER — HOSPITAL ENCOUNTER (OUTPATIENT)
Facility: MEDICAL CENTER | Age: 47
End: 2022-09-03
Attending: EMERGENCY MEDICINE | Admitting: HOSPITALIST
Payer: MEDICAID

## 2022-09-02 DIAGNOSIS — G45.9 TIA (TRANSIENT ISCHEMIC ATTACK): ICD-10-CM

## 2022-09-02 DIAGNOSIS — R53.1 LEFT-SIDED WEAKNESS: ICD-10-CM

## 2022-09-02 DIAGNOSIS — R07.9 CHEST PAIN, UNSPECIFIED TYPE: ICD-10-CM

## 2022-09-02 PROBLEM — I45.5 SINUS PAUSE: Status: ACTIVE | Noted: 2022-09-02

## 2022-09-02 PROBLEM — J44.9 COPD (CHRONIC OBSTRUCTIVE PULMONARY DISEASE) (HCC): Status: ACTIVE | Noted: 2022-09-02

## 2022-09-02 PROBLEM — R79.89 ELEVATED LFTS: Status: ACTIVE | Noted: 2022-09-02

## 2022-09-02 LAB
ABO + RH BLD: NORMAL
ABO GROUP BLD: NORMAL
ALBUMIN SERPL BCP-MCNC: 4 G/DL (ref 3.2–4.9)
ALBUMIN/GLOB SERPL: 1.5 G/DL
ALP SERPL-CCNC: 101 U/L (ref 30–99)
ALT SERPL-CCNC: 9 U/L (ref 2–50)
ANION GAP SERPL CALC-SCNC: 10 MMOL/L (ref 7–16)
APTT PPP: 26.6 SEC (ref 24.7–36)
AST SERPL-CCNC: 7 U/L (ref 12–45)
BASOPHILS # BLD AUTO: 0.1 % (ref 0–1.8)
BASOPHILS # BLD: 0.02 K/UL (ref 0–0.12)
BILIRUB SERPL-MCNC: 1.9 MG/DL (ref 0.1–1.5)
BLD GP AB SCN SERPL QL: NORMAL
BUN SERPL-MCNC: 14 MG/DL (ref 8–22)
CALCIUM SERPL-MCNC: 9.4 MG/DL (ref 8.5–10.5)
CHLORIDE SERPL-SCNC: 102 MMOL/L (ref 96–112)
CO2 SERPL-SCNC: 27 MMOL/L (ref 20–33)
CREAT SERPL-MCNC: 0.92 MG/DL (ref 0.5–1.4)
EKG IMPRESSION: NORMAL
EKG IMPRESSION: NORMAL
EOSINOPHIL # BLD AUTO: 0.05 K/UL (ref 0–0.51)
EOSINOPHIL NFR BLD: 0.3 % (ref 0–6.9)
ERYTHROCYTE [DISTWIDTH] IN BLOOD BY AUTOMATED COUNT: 53.6 FL (ref 35.9–50)
EST. AVERAGE GLUCOSE BLD GHB EST-MCNC: 117 MG/DL
FLUAV RNA SPEC QL NAA+PROBE: NEGATIVE
FLUBV RNA SPEC QL NAA+PROBE: NEGATIVE
GFR SERPLBLD CREATININE-BSD FMLA CKD-EPI: 76 ML/MIN/1.73 M 2
GLOBULIN SER CALC-MCNC: 2.7 G/DL (ref 1.9–3.5)
GLUCOSE SERPL-MCNC: 125 MG/DL (ref 65–99)
HBA1C MFR BLD: 5.7 % (ref 4–5.6)
HCT VFR BLD AUTO: 40.9 % (ref 37–47)
HGB BLD-MCNC: 12.7 G/DL (ref 12–16)
IMM GRANULOCYTES # BLD AUTO: 0.12 K/UL (ref 0–0.11)
IMM GRANULOCYTES NFR BLD AUTO: 0.8 % (ref 0–0.9)
INR PPP: 1.04 (ref 0.87–1.13)
LYMPHOCYTES # BLD AUTO: 1.6 K/UL (ref 1–4.8)
LYMPHOCYTES NFR BLD: 10.3 % (ref 22–41)
MCH RBC QN AUTO: 27 PG (ref 27–33)
MCHC RBC AUTO-ENTMCNC: 31.1 G/DL (ref 33.6–35)
MCV RBC AUTO: 86.8 FL (ref 81.4–97.8)
MONOCYTES # BLD AUTO: 1.02 K/UL (ref 0–0.85)
MONOCYTES NFR BLD AUTO: 6.6 % (ref 0–13.4)
NEUTROPHILS # BLD AUTO: 12.7 K/UL (ref 2–7.15)
NEUTROPHILS NFR BLD: 81.9 % (ref 44–72)
NRBC # BLD AUTO: 0 K/UL
NRBC BLD-RTO: 0 /100 WBC
PLATELET # BLD AUTO: 254 K/UL (ref 164–446)
PMV BLD AUTO: 11.9 FL (ref 9–12.9)
POTASSIUM SERPL-SCNC: 4 MMOL/L (ref 3.6–5.5)
PROT SERPL-MCNC: 6.7 G/DL (ref 6–8.2)
PROTHROMBIN TIME: 13.5 SEC (ref 12–14.6)
RBC # BLD AUTO: 4.71 M/UL (ref 4.2–5.4)
RH BLD: NORMAL
RSV RNA SPEC QL NAA+PROBE: NEGATIVE
SARS-COV-2 RNA RESP QL NAA+PROBE: NOTDETECTED
SODIUM SERPL-SCNC: 139 MMOL/L (ref 135–145)
SPECIMEN SOURCE: NORMAL
TROPONIN T SERPL-MCNC: 22 NG/L (ref 6–19)
TROPONIN T SERPL-MCNC: 24 NG/L (ref 6–19)
WBC # BLD AUTO: 15.5 K/UL (ref 4.8–10.8)

## 2022-09-02 PROCEDURE — 85025 COMPLETE CBC W/AUTO DIFF WBC: CPT

## 2022-09-02 PROCEDURE — 74175 CTA ABDOMEN W/CONTRAST: CPT

## 2022-09-02 PROCEDURE — 80053 COMPREHEN METABOLIC PANEL: CPT

## 2022-09-02 PROCEDURE — 0241U HCHG SARS-COV-2 COVID-19 NFCT DS RESP RNA 4 TRGT MIC: CPT

## 2022-09-02 PROCEDURE — 85610 PROTHROMBIN TIME: CPT

## 2022-09-02 PROCEDURE — 84484 ASSAY OF TROPONIN QUANT: CPT

## 2022-09-02 PROCEDURE — 93005 ELECTROCARDIOGRAM TRACING: CPT

## 2022-09-02 PROCEDURE — 86901 BLOOD TYPING SEROLOGIC RH(D): CPT

## 2022-09-02 PROCEDURE — 93010 ELECTROCARDIOGRAM REPORT: CPT | Performed by: INTERNAL MEDICINE

## 2022-09-02 PROCEDURE — A9270 NON-COVERED ITEM OR SERVICE: HCPCS | Performed by: HOSPITALIST

## 2022-09-02 PROCEDURE — 83036 HEMOGLOBIN GLYCOSYLATED A1C: CPT

## 2022-09-02 PROCEDURE — 85730 THROMBOPLASTIN TIME PARTIAL: CPT

## 2022-09-02 PROCEDURE — 99214 OFFICE O/P EST MOD 30 MIN: CPT | Performed by: INTERNAL MEDICINE

## 2022-09-02 PROCEDURE — G0378 HOSPITAL OBSERVATION PER HR: HCPCS

## 2022-09-02 PROCEDURE — C9803 HOPD COVID-19 SPEC COLLECT: HCPCS | Performed by: EMERGENCY MEDICINE

## 2022-09-02 PROCEDURE — 70496 CT ANGIOGRAPHY HEAD: CPT

## 2022-09-02 PROCEDURE — 99220 PR INITIAL OBSERVATION CARE,LEVL III: CPT | Performed by: HOSPITALIST

## 2022-09-02 PROCEDURE — 700117 HCHG RX CONTRAST REV CODE 255: Performed by: EMERGENCY MEDICINE

## 2022-09-02 PROCEDURE — 96372 THER/PROPH/DIAG INJ SC/IM: CPT | Mod: XU

## 2022-09-02 PROCEDURE — 70450 CT HEAD/BRAIN W/O DYE: CPT

## 2022-09-02 PROCEDURE — 36415 COLL VENOUS BLD VENIPUNCTURE: CPT

## 2022-09-02 PROCEDURE — 99285 EMERGENCY DEPT VISIT HI MDM: CPT

## 2022-09-02 PROCEDURE — 700102 HCHG RX REV CODE 250 W/ 637 OVERRIDE(OP): Performed by: EMERGENCY MEDICINE

## 2022-09-02 PROCEDURE — 71045 X-RAY EXAM CHEST 1 VIEW: CPT

## 2022-09-02 PROCEDURE — 94760 N-INVAS EAR/PLS OXIMETRY 1: CPT

## 2022-09-02 PROCEDURE — 86850 RBC ANTIBODY SCREEN: CPT

## 2022-09-02 PROCEDURE — 86900 BLOOD TYPING SEROLOGIC ABO: CPT

## 2022-09-02 PROCEDURE — 93005 ELECTROCARDIOGRAM TRACING: CPT | Performed by: EMERGENCY MEDICINE

## 2022-09-02 PROCEDURE — 70498 CT ANGIOGRAPHY NECK: CPT

## 2022-09-02 PROCEDURE — 94660 CPAP INITIATION&MGMT: CPT

## 2022-09-02 PROCEDURE — 0042T CT-CEREBRAL PERFUSION ANALYSIS: CPT

## 2022-09-02 PROCEDURE — 700102 HCHG RX REV CODE 250 W/ 637 OVERRIDE(OP): Performed by: HOSPITALIST

## 2022-09-02 PROCEDURE — A9270 NON-COVERED ITEM OR SERVICE: HCPCS | Performed by: EMERGENCY MEDICINE

## 2022-09-02 PROCEDURE — 700111 HCHG RX REV CODE 636 W/ 250 OVERRIDE (IP): Performed by: HOSPITALIST

## 2022-09-02 RX ORDER — ASPIRIN 81 MG/1
324 TABLET, CHEWABLE ORAL DAILY
Status: DISCONTINUED | OUTPATIENT
Start: 2022-09-02 | End: 2022-09-03 | Stop reason: HOSPADM

## 2022-09-02 RX ORDER — AZITHROMYCIN 250 MG/1
250-500 TABLET, FILM COATED ORAL DAILY
Status: ON HOLD | COMMUNITY
End: 2022-09-03

## 2022-09-02 RX ORDER — MONTELUKAST SODIUM 10 MG/1
10 TABLET ORAL
Status: DISCONTINUED | OUTPATIENT
Start: 2022-09-02 | End: 2022-09-03 | Stop reason: HOSPADM

## 2022-09-02 RX ORDER — NITROGLYCERIN 0.4 MG/1
0.4 TABLET SUBLINGUAL PRN
Status: DISCONTINUED | OUTPATIENT
Start: 2022-09-02 | End: 2022-09-03 | Stop reason: HOSPADM

## 2022-09-02 RX ORDER — ASPIRIN 325 MG
325 TABLET ORAL DAILY
Status: DISCONTINUED | OUTPATIENT
Start: 2022-09-02 | End: 2022-09-03 | Stop reason: HOSPADM

## 2022-09-02 RX ORDER — AMOXICILLIN 250 MG
2 CAPSULE ORAL 2 TIMES DAILY
Status: DISCONTINUED | OUTPATIENT
Start: 2022-09-02 | End: 2022-09-03 | Stop reason: HOSPADM

## 2022-09-02 RX ORDER — CEPHALEXIN 500 MG/1
500 CAPSULE ORAL 4 TIMES DAILY
Status: ON HOLD | COMMUNITY
End: 2022-09-03

## 2022-09-02 RX ORDER — BUDESONIDE AND FORMOTEROL FUMARATE DIHYDRATE 160; 4.5 UG/1; UG/1
2 AEROSOL RESPIRATORY (INHALATION) 2 TIMES DAILY
Status: DISCONTINUED | OUTPATIENT
Start: 2022-09-02 | End: 2022-09-03 | Stop reason: HOSPADM

## 2022-09-02 RX ORDER — BISACODYL 10 MG
10 SUPPOSITORY, RECTAL RECTAL
Status: DISCONTINUED | OUTPATIENT
Start: 2022-09-02 | End: 2022-09-03 | Stop reason: HOSPADM

## 2022-09-02 RX ORDER — POLYETHYLENE GLYCOL 3350 17 G/17G
1 POWDER, FOR SOLUTION ORAL
Status: DISCONTINUED | OUTPATIENT
Start: 2022-09-02 | End: 2022-09-03 | Stop reason: HOSPADM

## 2022-09-02 RX ORDER — TRAZODONE HYDROCHLORIDE 50 MG/1
50 TABLET ORAL
Status: DISCONTINUED | OUTPATIENT
Start: 2022-09-02 | End: 2022-09-03 | Stop reason: HOSPADM

## 2022-09-02 RX ORDER — ARIPIPRAZOLE 15 MG/1
30 TABLET ORAL EVERY MORNING
Status: DISCONTINUED | OUTPATIENT
Start: 2022-09-03 | End: 2022-09-03 | Stop reason: HOSPADM

## 2022-09-02 RX ORDER — ATORVASTATIN CALCIUM 20 MG/1
20 TABLET, FILM COATED ORAL
Status: DISCONTINUED | OUTPATIENT
Start: 2022-09-02 | End: 2022-09-03 | Stop reason: HOSPADM

## 2022-09-02 RX ORDER — ASPIRIN 325 MG
325 TABLET ORAL ONCE
Status: COMPLETED | OUTPATIENT
Start: 2022-09-02 | End: 2022-09-02

## 2022-09-02 RX ORDER — ACETAMINOPHEN 325 MG/1
650 TABLET ORAL EVERY 6 HOURS PRN
Status: DISCONTINUED | OUTPATIENT
Start: 2022-09-02 | End: 2022-09-03 | Stop reason: HOSPADM

## 2022-09-02 RX ORDER — ASPIRIN 300 MG/1
300 SUPPOSITORY RECTAL DAILY
Status: DISCONTINUED | OUTPATIENT
Start: 2022-09-02 | End: 2022-09-03 | Stop reason: HOSPADM

## 2022-09-02 RX ORDER — FAMOTIDINE 20 MG/1
20 TABLET, FILM COATED ORAL 2 TIMES DAILY
Status: DISCONTINUED | OUTPATIENT
Start: 2022-09-02 | End: 2022-09-03 | Stop reason: HOSPADM

## 2022-09-02 RX ORDER — ENOXAPARIN SODIUM 100 MG/ML
30 INJECTION SUBCUTANEOUS EVERY 12 HOURS
Status: DISCONTINUED | OUTPATIENT
Start: 2022-09-02 | End: 2022-09-03 | Stop reason: HOSPADM

## 2022-09-02 RX ADMIN — ASPIRIN 325 MG: 325 TABLET ORAL at 16:13

## 2022-09-02 RX ADMIN — BUDESONIDE AND FORMOTEROL FUMARATE DIHYDRATE 2 PUFF: 160; 4.5 AEROSOL RESPIRATORY (INHALATION) at 18:20

## 2022-09-02 RX ADMIN — FAMOTIDINE 20 MG: 20 TABLET, FILM COATED ORAL at 18:20

## 2022-09-02 RX ADMIN — TRAZODONE HYDROCHLORIDE 50 MG: 50 TABLET ORAL at 20:34

## 2022-09-02 RX ADMIN — IOHEXOL 40 ML: 350 INJECTION, SOLUTION INTRAVENOUS at 15:14

## 2022-09-02 RX ADMIN — IOHEXOL 80 ML: 350 INJECTION, SOLUTION INTRAVENOUS at 15:13

## 2022-09-02 RX ADMIN — IOHEXOL 80 ML: 350 INJECTION, SOLUTION INTRAVENOUS at 15:32

## 2022-09-02 RX ADMIN — ATORVASTATIN CALCIUM 20 MG: 20 TABLET, FILM COATED ORAL at 20:34

## 2022-09-02 RX ADMIN — DOCUSATE SODIUM 50 MG AND SENNOSIDES 8.6 MG 2 TABLET: 8.6; 5 TABLET, FILM COATED ORAL at 18:20

## 2022-09-02 RX ADMIN — MONTELUKAST 10 MG: 10 TABLET, FILM COATED ORAL at 20:34

## 2022-09-02 RX ADMIN — ENOXAPARIN SODIUM 30 MG: 30 INJECTION SUBCUTANEOUS at 18:20

## 2022-09-02 ASSESSMENT — LIFESTYLE VARIABLES
HAVE PEOPLE ANNOYED YOU BY CRITICIZING YOUR DRINKING: NO
AVERAGE NUMBER OF DAYS PER WEEK YOU HAVE A DRINK CONTAINING ALCOHOL: 0
TOTAL SCORE: 0
CONSUMPTION TOTAL: NEGATIVE
ALCOHOL_USE: NO
HOW MANY TIMES IN THE PAST YEAR HAVE YOU HAD 5 OR MORE DRINKS IN A DAY: 0
ON A TYPICAL DAY WHEN YOU DRINK ALCOHOL HOW MANY DRINKS DO YOU HAVE: 0
EVER HAD A DRINK FIRST THING IN THE MORNING TO STEADY YOUR NERVES TO GET RID OF A HANGOVER: NO
TOTAL SCORE: 0
DOES PATIENT WANT TO STOP DRINKING: NO
TOTAL SCORE: 0
HAVE YOU EVER FELT YOU SHOULD CUT DOWN ON YOUR DRINKING: NO
EVER FELT BAD OR GUILTY ABOUT YOUR DRINKING: NO

## 2022-09-02 ASSESSMENT — FIBROSIS 4 INDEX
FIB4 SCORE: 0.45
FIB4 SCORE: 0.45

## 2022-09-02 NOTE — H&P
Hospital Medicine History & Physical Note    Date of Service  9/2/2022    Primary Care Physician  Heriberto Sharp M.D.    Consultants  neurology  Cardiology    Specialist Names: Dr Melchor,  To    Code Status  Full Code    Chief Complaint  Chief Complaint   Patient presents with    Tingling     Started on L pinky around 1230 and radiates up her arm     Headache     Started 10 min PTA while in ambulance       History of Presenting Illness  Lorene Haro is a 47 y.o. female who presented 9/2/2022 with chest pain and left-sided weakness and tingling.  Patient was recently hospitalized after being diagnosed with COVID 10 days ago discharged home on antibiotics and oxygen.  She reports that she has noticed left-sided weakness and tingling with symptoms starting about 2 days ago worsening earlier today.  She also reports having episode of chest pain in her mid chest radiating to her back sharp associated with mild dyspnea that lasted for about an hour.  At the time of my examination she denies any chest pain.  She reports that her left-sided weakness is improved.  On presentation to the emergency department a code stroke was called and the patient was evaluated by neurology.  CT head and CTA head and neck were negative for acute pathology and neurology recommended admission for close clinical monitoring MRI initiation of aspirin and statin.  The patient has a history of sleep apnea COPD morbid obesity and paroxysmal atrial fibrillation.  She had a loop recorder implanted to screen for recurrent A. fib and decide on resuming anticoagulation and of note she had a 7-second pause on 822 at 7:20 AM.  She denies any lightheadedness or loss of consciousness      I discussed the plan of care with  patient and ED physician .    Review of Systems  Review of Systems   All other systems reviewed and are negative.    Past Medical History   has a past medical history of A-fib (HCC), Asthma, Bipolar 2 disorder (HCC), Chickenpox,  Hypertension, On home oxygen therapy, Other psychological stress, Schizophrenic disorder (HCC), and Yeast infection of the skin (04/01/2021).    Surgical History   has a past surgical history that includes hysterectomy laparoscopy; colectomy; cholecystectomy; and knee arthroscopy (Left).     Family History  family history includes Cancer in her sister; No Known Problems in her mother.       Social History   reports that she has quit smoking. She has never used smokeless tobacco. She reports that she does not drink alcohol and does not use drugs.    Allergies  Allergies   Allergen Reactions    Penicillin G Rash and Itching     pt reports that she gets a rash all over her body and gets itchy    Amoxicillin Rash and Itching     Tolerates cephalosporins, pt reports that she gets a rash all over her body and gets itchy       Medications  Prior to Admission Medications   Prescriptions Last Dose Informant Patient Reported? Taking?   aripiprazole (ABILIFY) 30 MG tablet 9/2/2022 at 0800 Patient's Home Pharmacy Yes No   Sig: Take 30 mg by mouth every morning.   atorvastatin (LIPITOR) 20 MG Tab 9/1/2022 at 1930 Patient's Home Pharmacy Yes No   Sig: Take 20 mg by mouth at bedtime.   azithromycin (ZITHROMAX Z-YESY) 250 MG Tab UNK at [facility] Patient's Home Pharmacy Yes Yes   Sig: Take 250-500 mg by mouth every day. Take 2 tabs on day 1, then 1 tablet daily on days 2-5  Prescribed 8/22/2022   budesonide-formoterol (SYMBICORT) 160-4.5 MCG/ACT Aerosol UNK at [facility] Patient's Home Pharmacy No No   Sig: Inhale 2 Puffs 2 times a day for 30 days.   cephALEXin (KEFLEX) 500 MG Cap UNK at [facility] Patient's Home Pharmacy Yes Yes   Sig: Take 500 mg by mouth 4 times a day. 7 DAY COURSE PRESCRIBED 8/2/2022   famotidine (PEPCID) 20 MG Tab NEW RX at NOT STARTED Patient's Home Pharmacy No No   Sig: Take 1 Tablet by mouth 2 times a day.   montelukast (SINGULAIR) 10 MG Tab 9/1/2022 at 1930 Patient's Home Pharmacy No No   Sig: Take 1 Tablet by mouth at bedtime  for 30 days.   nitroglycerin (NITROSTAT) 0.4 MG SL Tab >WEEK at UNK Patient No No   Sig: Place 1 tablet under the tongue as needed for Chest Pain.   traZODone (DESYREL) 50 MG Tab 9/1/2022 at 1930 Patient's Home Pharmacy No No   Sig: Take 1 Tablet by mouth at bedtime.      Facility-Administered Medications: None       Physical Exam  Temp:  [36.4 °C (97.6 °F)] 36.4 °C (97.6 °F)  Pulse:  [80] 80  Resp:  [20] 20  BP: (122)/(77) 122/77  SpO2:  [94 %] 94 %  Blood Pressure: 122/77   Temperature: 36.4 °C (97.6 °F)   Pulse: 80   Respiration: 20   Pulse Oximetry: 94 %       Physical Exam  Vitals and nursing note reviewed.   Constitutional:       General: She is not in acute distress.     Appearance: She is obese.   HENT:      Head: Normocephalic and atraumatic.      Nose: Nose normal. No rhinorrhea.      Mouth/Throat:      Pharynx: No oropharyngeal exudate or posterior oropharyngeal erythema.   Eyes:      General: No scleral icterus.        Right eye: No discharge.         Left eye: No discharge.   Cardiovascular:      Rate and Rhythm: Normal rate and regular rhythm.      Heart sounds: Normal heart sounds. No murmur heard.    No friction rub. No gallop.   Pulmonary:      Effort: Pulmonary effort is normal. No respiratory distress.      Breath sounds: No stridor. Decreased breath sounds and wheezing present. No rhonchi or rales.   Chest:      Chest wall: No tenderness.   Abdominal:      General: Bowel sounds are normal. There is no distension.      Palpations: Abdomen is soft. There is no mass.      Tenderness: There is no abdominal tenderness. There is no rebound.   Musculoskeletal:         General: Swelling present. No tenderness.      Cervical back: Neck supple. No rigidity.   Skin:     General: Skin is warm and dry.      Coloration: Skin is not cyanotic or jaundiced.      Nails: There is no clubbing.      Comments: Stasis dermatitis changes   Neurological:      General: No focal deficit present.      Mental Status: She is  alert and oriented to person, place, and time.      Cranial Nerves: No cranial nerve deficit.      Motor: Weakness (Lower extremities right 4-/5 left 3+/5) present.   Psychiatric:         Mood and Affect: Mood normal.         Behavior: Behavior normal.       Laboratory:  Recent Labs     09/02/22  1400   WBC 15.5*   RBC 4.71   HEMOGLOBIN 12.7   HEMATOCRIT 40.9   MCV 86.8   MCH 27.0   MCHC 31.1*   RDW 53.6*   PLATELETCT 254   MPV 11.9     Recent Labs     09/02/22  1400   SODIUM 139   POTASSIUM 4.0   CHLORIDE 102   CO2 27   GLUCOSE 125*   BUN 14   CREATININE 0.92   CALCIUM 9.4     Recent Labs     09/02/22  1400   ALTSGPT 9   ASTSGOT 7*   ALKPHOSPHAT 101*   TBILIRUBIN 1.9*   GLUCOSE 125*     Recent Labs     09/02/22  1400   APTT 26.6   INR 1.04     No results for input(s): NTPROBNP in the last 72 hours.      Recent Labs     09/02/22  1400   TROPONINT 22*       Imaging:  CT-CTA COMPLETE THORACOABDOMINAL AORTA   Final Result      1.  No aortic aneurysm or dissection.   2.  Hepatosplenomegaly            CT-CEREBRAL PERFUSION ANALYSIS   Final Result      1.  Cerebral blood flow less than 30% likely representing completed infarct = 0 mL.      2.  T Max more than 6 seconds likely representing combination of completed infarct and ischemia = 0 mL.      3.  Mismatched volume likely representing ischemic brain/penumbra = None      4.  Please note that the cerebral perfusion was performed on the limited brain tissue around the basal ganglia region. Infarct/ischemia outside the CT perfusion sections can be missed in this study.      CT-CTA NECK WITH & W/O-POST PROCESSING   Final Result      CT angiogram of the neck within normal limits.      CT-CTA HEAD WITH & W/O-POST PROCESS   Final Result      CT angiogram of the [place] within normal limits.      CT-HEAD W/O   Final Result      No acute intracranial abnormality.                  DX-CHEST-PORTABLE (1 VIEW)   Final Result      No acute cardiac or pulmonary abnormalities  are identified.      MR-BRAIN-W/O    (Results Pending)   EC-ECHOCARDIOGRAM COMPLETE W/O CONT    (Results Pending)            Assessment/Plan:  Justification for Admission Status  I anticipate this patient is appropriate for observation status at this time because work-up for chest pain and TIA    Patient will need a Telemetry bed on NEUROLOGY service .  The need is secondary to TIA and history of sinus pause.    * Chest pain- (present on admission)  Assessment & Plan  The 10-year ASCVD risk score (McCluremildred GAUTHIER Jr., et al., 2013) is: 1.2%    Values used to calculate the score:      Age: 47 years      Sex: Female      Is Non- : No      Diabetic: No      Tobacco smoker: No      Systolic Blood Pressure: 122 mmHg      Is BP treated: No      HDL Cholesterol: 48 mg/dL      Total Cholesterol: 198 mg/dL     Serial trop   echo  2/22 mild AS EF 70%  [place]   Negative nuclear medicine stress test Feb 2022      Elevated LFTs  Assessment & Plan  Abdominal exam is benign  Recheck LFTs and fractionate bilirubin  CT reveals hepatosplenomegaly    Sinus pause  Assessment & Plan  7-second sinus pause reviewed strip under media tab  Telemetry monitoring  Patient denies syncope  Consult cardiology discussed with Dr. Arreguin  She is not on any AV john blocking agents    COPD (chronic obstructive pulmonary disease) (HCC)  Assessment & Plan  No acute exacerbation continue Symbicort  RT protocol    Prediabetes- (present on admission)  Assessment & Plan  Reinforced lifestyle changes  Most recent hemoglobin A1c 6.0    Morbid obesity (HCC)- (present on admission)  Assessment & Plan  Body mass index is 53.15 kg/m².     Weakness- (present on admission)  Assessment & Plan  Possible TIA  Check MRI per neurology recommendations  Aspirin statin  PT OT eval's    KATHIA (obstructive sleep apnea)- (present on admission)  Assessment & Plan  Nocturnal CPAP    Bipolar Disorder- (present on admission)  Assessment & Plan  Continue home meds         VTE prophylaxis: enoxaparin ppx

## 2022-09-02 NOTE — ED PROVIDER NOTES
Need Mri of the brain,ED Provider Note    Scribed for Claribel Cortez M.D. by Willie Hammer. 9/2/2022  2:07 PM    Primary care provider: Heriberto Sharp M.D.  Means of arrival: EMS  History obtained from: Patient  History limited by: None  CHIEF COMPLAINT  Chief Complaint   Patient presents with    Tingling     Started on L pinky around 1230 and radiates up her arm     Headache     Started 10 min PTA while in ambulance       HPI  Lorene Haro is a 47 y.o. female who presents for evaluation of left leg weakness, left arm numbness, and chest pain onset 2 hours ago (at noon today.)  Patient reports she began experiencing left sided leg weakness 2 hours ago which progressed into intermittent chest pain (sharp) half an hour later. She states the chest pain episodes last around 15 minutes and is currently not experiencing the symptoms. She additionally notes she has intermittent left sided tingling following the chest pain. She denies history of similar symptoms. She presents associated symptoms of headache. Denies any other symptoms. No alleviating or exacerbating factors noted at this time. Patient has additional history of COPD, A-fib, and hypertension and complies with medication. Patient states she was medicating with blood thinners but was taken off them recently. The patient notes she is unsure if she is diabetic. The patient sees Dr. Sharp for her primary care. She denies history of MI or stroke.  Patient is poor historian      REVIEW OF SYSTEMS  Pertinent positives include chest pain, headache, and left sided weakness and tingling.   Pertinent negatives include no other symptoms.   See HPI for further details. All other systems are negative.    PAST MEDICAL HISTORY  Past Medical History:   Diagnosis Date    A-fib (HCC)     Asthma     Bipolar 2 disorder (HCC)     Chickenpox     Hypertension     On home oxygen therapy     Other psychological stress     Schizophrenic disorder (HCC)     Yeast infection of the  "skin 04/01/2021       FAMILY HISTORY  Family History   Problem Relation Age of Onset    No Known Problems Mother     Cancer Sister        SOCIAL HISTORY  Social History     Tobacco Use    Smoking status: Former    Smokeless tobacco: Never   Vaping Use    Vaping Use: Never used   Substance Use Topics    Alcohol use: Never    Drug use: Never      Social History     Substance and Sexual Activity   Drug Use Never       SURGICAL HISTORY  Past Surgical History:   Procedure Laterality Date    CHOLECYSTECTOMY      COLECTOMY      HYSTERECTOMY LAPAROSCOPY      KNEE ARTHROSCOPY Left        CURRENT MEDICATIONS  Home Medications       Reviewed by Robbie Garcia (Pharmacy Tech) on 09/02/22 at 1553  Med List Status: Complete     Medication Last Dose Status   aripiprazole (ABILIFY) 30 MG tablet 9/2/2022 Active   atorvastatin (LIPITOR) 20 MG Tab 9/1/2022 Active   azithromycin (ZITHROMAX Z-YESY) 250 MG Tab UNK Active   budesonide-formoterol (SYMBICORT) 160-4.5 MCG/ACT Aerosol UNK Active   cephALEXin (KEFLEX) 500 MG Cap UNK Active   famotidine (PEPCID) 20 MG Tab NEW RX Active   montelukast (SINGULAIR) 10 MG Tab 9/1/2022 Active   nitroglycerin (NITROSTAT) 0.4 MG SL Tab >WEEK Active   traZODone (DESYREL) 50 MG Tab 9/1/2022 Active                    ALLERGIES  Allergies   Allergen Reactions    Penicillin G Rash and Itching     pt reports that she gets a rash all over her body and gets itchy    Amoxicillin Rash and Itching     Tolerates cephalosporins, pt reports that she gets a rash all over her body and gets itchy       PHYSICAL EXAM  VITAL SIGNS: /77   Pulse 80   Temp 36.4 °C (97.6 °F) (Temporal)   Resp 20   Ht 1.575 m (5' 2\")   Wt (!) 135 kg (298 lb)   LMP  (LMP Unknown)   SpO2 94%   BMI 54.50 kg/m²      Constitutional: Disheveled and unkempt. Markedly elevated BMI   HENT: Normocephalic, atraumatic; Bilateral external ears normal; dry mucous membranes;   Eyes: PERRL, EOMI, Conjunctiva normal. No discharge.   Neck:  " Supple, nontender midline; No stridor; No nuchal rigidity.   Lymphatic: No cervical lymphadenopathy noted.   Cardiovascular: Regular rate and rhythm without murmurs, rubs, or gallop.   Thorax & Lungs: No respiratory distress, breath sounds clear to auscultation bilaterally chest wall. Rhonchorous cough following MD arrival in room.  Abdomen: Soft, nontender, bowel sounds normal. No obvious masses; No pulsatile masses; no rebound, guarding, or peritoneal signs.   Skin: Good color; warm and dry without rash or petechia.  Back: Nontender, No CVA tenderness.   Extremities: Distal radial, dorsalis pedis, posterior tibial pulses are equal bilaterally; Changes of chronic venostasis bilateral legs with 1 + pitting edema  Musculoskeletal: Good range of motion in all major joints. No tenderness to palpation or major deformities noted.   Neurologic: Alert & oriented x 4, clear speech.  Mildly decreased  on the left when compared to the right.  Mildly decreased strength on the left with testing of dorsiflexor and plantar flexor testing.  Normal sensation to light touch throughout.  Cranial nerves II through XII are intact.  No drift of upper extremities.  Very slight drift of the left lower extremity. NIH 2    EKG  12 Lead EKG interpreted by me as shown below.    LABS/RADIOLOGY/PROCEDURES  Results for orders placed or performed during the hospital encounter of 09/02/22   CBC WITH DIFFERENTIAL   Result Value Ref Range    WBC 15.5 (H) 4.8 - 10.8 K/uL    RBC 4.71 4.20 - 5.40 M/uL    Hemoglobin 12.7 12.0 - 16.0 g/dL    Hematocrit 40.9 37.0 - 47.0 %    MCV 86.8 81.4 - 97.8 fL    MCH 27.0 27.0 - 33.0 pg    MCHC 31.1 (L) 33.6 - 35.0 g/dL    RDW 53.6 (H) 35.9 - 50.0 fL    Platelet Count 254 164 - 446 K/uL    MPV 11.9 9.0 - 12.9 fL    Neutrophils-Polys 81.90 (H) 44.00 - 72.00 %    Lymphocytes 10.30 (L) 22.00 - 41.00 %    Monocytes 6.60 0.00 - 13.40 %    Eosinophils 0.30 0.00 - 6.90 %    Basophils 0.10 0.00 - 1.80 %    Immature  Granulocytes 0.80 0.00 - 0.90 %    Nucleated RBC 0.00 /100 WBC    Neutrophils (Absolute) 12.70 (H) 2.00 - 7.15 K/uL    Lymphs (Absolute) 1.60 1.00 - 4.80 K/uL    Monos (Absolute) 1.02 (H) 0.00 - 0.85 K/uL    Eos (Absolute) 0.05 0.00 - 0.51 K/uL    Baso (Absolute) 0.02 0.00 - 0.12 K/uL    Immature Granulocytes (abs) 0.12 (H) 0.00 - 0.11 K/uL    NRBC (Absolute) 0.00 K/uL   COMP METABOLIC PANEL   Result Value Ref Range    Sodium 139 135 - 145 mmol/L    Potassium 4.0 3.6 - 5.5 mmol/L    Chloride 102 96 - 112 mmol/L    Co2 27 20 - 33 mmol/L    Anion Gap 10.0 7.0 - 16.0    Glucose 125 (H) 65 - 99 mg/dL    Bun 14 8 - 22 mg/dL    Creatinine 0.92 0.50 - 1.40 mg/dL    Calcium 9.4 8.5 - 10.5 mg/dL    AST(SGOT) 7 (L) 12 - 45 U/L    ALT(SGPT) 9 2 - 50 U/L    Alkaline Phosphatase 101 (H) 30 - 99 U/L    Total Bilirubin 1.9 (H) 0.1 - 1.5 mg/dL    Albumin 4.0 3.2 - 4.9 g/dL    Total Protein 6.7 6.0 - 8.2 g/dL    Globulin 2.7 1.9 - 3.5 g/dL    A-G Ratio 1.5 g/dL   PROTHROMBIN TIME   Result Value Ref Range    PT 13.5 12.0 - 14.6 sec    INR 1.04 0.87 - 1.13   APTT   Result Value Ref Range    APTT 26.6 24.7 - 36.0 sec   COD (ADULT)   Result Value Ref Range    ABO Grouping Only A     Rh Grouping Only POS     Antibody Screen-Cod NEG    TROPONIN   Result Value Ref Range    Troponin T 22 (H) 6 - 19 ng/L   ESTIMATED GFR   Result Value Ref Range    GFR (CKD-EPI) 76 >60 mL/min/1.73 m 2   CoV-2, FLU A/B, and RSV by PCR (2-4 Hours CEPHEID) : Collect NP swab in VTM    Specimen: Respirate   Result Value Ref Range    Influenza virus A RNA Negative Negative    Influenza virus B, PCR Negative Negative    RSV, PCR Negative Negative    SARS-CoV-2 by PCR NotDetected     SARS-CoV-2 Source NP Swab    EKG   Result Value Ref Range    Report       [facility] Emergency Dept.    Test Date:  2022-09-02  Pt Name:    ADELINA MILLAN                Department: ER  MRN:        [id number]                      Room:       [facility]  Gender:     Female                        Technician: [id number]  :        [id number]                   Requested By:ER TRIAGE PROTOCOL  Order #:    [id number]                    Reading MD: Claribel Cortez    Measurements  Intervals                                Axis  Rate:       79                           P:          66  VT:         121                          QRS:        63  QRSD:       104                          T:          -6  QT:         375  QTc:        430    Interpretive Statements  Sinus rhythm rate 79  Normal axis  Normal intervals  Increased baseline artifact  No ST elevation or depression  Borderline T abnormalities, inferior leads  Compared to ECG 2022 06:11:29  T-wave abnormality now present  Atrial premature complex(es) no longer present  Electronically Signed On 2022 16:45:2 3 PDT by Claribel Cortez           CT-CTA COMPLETE THORACOABDOMINAL AORTA   Final Result      1.  No aortic aneurysm or dissection.   2.  Hepatosplenomegaly            CT-CEREBRAL PERFUSION ANALYSIS   Final Result      1.  Cerebral blood flow less than 30% likely representing completed infarct = 0 mL.      2.  T Max more than 6 seconds likely representing combination of completed infarct and ischemia = 0 mL.      3.  Mismatched volume likely representing ischemic brain/penumbra = None      4.  Please note that the cerebral perfusion was performed on the limited brain tissue around the basal ganglia region. Infarct/ischemia outside the CT perfusion sections can be missed in this study.      CT-CTA NECK WITH & W/O-POST PROCESSING   Final Result      CT angiogram of the neck within normal limits.      CT-CTA HEAD WITH & W/O-POST PROCESS   Final Result      CT angiogram of the [place] within normal limits.      CT-HEAD W/O   Final Result      No acute intracranial abnormality.                  DX-CHEST-PORTABLE (1 VIEW)   Final Result      No acute cardiac or pulmonary abnormalities are identified.      MR-BRAIN-W/O    (Results Pending)        COURSE & MEDICAL DECISION MAKING  Pertinent Labs & Imaging studies reviewed. (See chart for details)    Reviewed patient's old medical records which showed she was positive for COVID 1 week ago.    2:07 PM - Patient seen and examined at bedside. Discussed plan of care, including labs and imaging. Patient agrees to the plan of care. Ordered for labs and imaging to evaluate her symptoms.     4:03 PM nurse practitioner for neurology saw patient here in ER.  Patient told him that she had 2 days of weakness.  This is different from what patient told us here in the ER.  APRN for neurology recommends MRI scan of the brain, long term cardiac monitoring with Zio patch etc. since she had two EKGs in 2020 and 2021 that indicated A-fib.  Also recommends Aspirin 81 mg daily.    4:04 PM - Paged Hospitalist.    4:34 PM I discussed the patient's case and the above findings with Dr. Enrique Sorensen (Hospitalist) who will assess the patient for hospitalization.     4:35 PM - I reevaluated the patient at bedside. I discussed the patient's diagnostic study results which are shown above. I informed the patient of my plan to admit today given the patient's current presentation and diagnostic study results. Patient verbalizes understanding and support with my plan for admission.       Patient presents to the ER with complaint of left leg weakness, left arm numbness, and intermittent chest pain.  She said that the left leg weakness began 2 hours ago.  This was shortly followed by intermittent chest pain as well as left arm numbness.  Patient has slight decreased  strength on the left as well as slight weakness with dorsiflexor and plantar flexor testing on the left when compared to the right.  No drift of upper extremities.  There is a very slight drift of the lower extremity.  Patient has multiple risk factors and that she has history of intermittent atrial fibrillation.  She is not anticoagulated.  She is morbidly obese.  She just  had COVID.  Since she complained of chest pain with neurologic symptoms I went ahead and did a CTA of the head and neck as well as a CT of the thoracic abdominal aorta.  There is no dissection of the aorta.  CTA of the head and neck are negative.  EKG is nonacute.  It looks unchanged from previous.  Initial troponin is minimally elevated at 24.  She is COVID-negative today.  Chest x-ray is negative.  Neurology saw the patient after I called a code stroke since she told us that her symptoms began 2 hours prior to arrival.  She told the APRN with neurology something different.  She told the neurology team that her symptoms started 2 days ago.  Neurology recommended MRI scan and aspirin daily.  Please see their note.  This time patient will need to be admitted for MRI scan.  I spoke with Dr. Enrique Sorensen, hospitalist on-call, he will kindly evaluate the patient hospitalization.      DISPOSITION:  Patient will be hospitalized by Dr. Enrique Sorensen in guarded condition.    FINAL IMPRESSION  1. Left-sided weakness Acute   2. Chest pain, unspecified type Acute   3. TIA (transient ischemic attack)        Willie HARRIS ([employer]), am scribing for, and in the presence of, Claribel Cortez M.D..    Electronically signed by: Willie Hammer ([employer]), 9/2/2022. Claribel STEVENSON M.D. personally performed the services described in this documentation, as scribed by Willie Hammer in my presence, and it is both accurate and complete.    This dictation has been created using voice recognition software. The accuracy of the dictation is limited by the abilities of the software. I expect there may be some errors of grammar and possibly content. I made every attempt to manually correct the errors within my dictation. However, errors related to voice recognition software may still exist and should be interpreted within the appropriate context.    The note accurately reflects work and decisions made by me.  Claribel Cortez M.D.  9/2/2022  11:29  PM

## 2022-09-02 NOTE — TELEPHONE ENCOUNTER
Called pt [phone]  No answer, left VM to call [phone]  Regarding urgent EP referral per VR

## 2022-09-02 NOTE — TELEPHONE ENCOUNTER
Patient scheduled to see Dr. Barraza on 9-23-22. This is EP's first availability. Patient also put on wait list. Patient in ER now, this appt will print out on her discharge paoperwork

## 2022-09-02 NOTE — ED TRIAGE NOTES
Chief Complaint   Patient presents with   • Tingling     Started on L pinky around 1230 and radiates up her arm    • Headache     Started 10 min PTA while in ambulance     [facility] Pt was at [facility]. Pt was recently diagnosed with COVID on 8/22/22. Blood glucose 137, [place] scale negative. Pt also mentions she has palpitations x several months.

## 2022-09-02 NOTE — PROGRESS NOTES
Neurology brief note     Patient with 2 day history of subjective weakness and possible left sided paraesthesia. No lateralizing symptoms, strength appears symmetric bilaterally in upper and lower extremities.  Workup per ER time.  No suspicion for large vessel occlusion and outside thrombolytic window, as such will cancel stroke alert.  ER to call with any further Neurology concerns. Of note patient reports Eliquis being discontinued recently.    - Recommend MRI Brain without contrast  - Recommend long term cardiac monitoring with Zio patch / loop recorder  - 2 EKGs ,in 2020 and 2021, demonstrate atrial fibrillation. Will need definitive diagnosis per cardiology  - Recommend ASA 81mg daily  - Check lipid profile. Atorvastatin 80mg daily. Long term LDL goal <70      HENRIETTA Goldberg  Neurology Acute Care Services

## 2022-09-02 NOTE — TELEPHONE ENCOUNTER
------------------------------------------------------------------  Urgent referral to EP placed per VR  Route to Frances SALAS for urgent scheduling

## 2022-09-02 NOTE — ED NOTES
Med Rec complete per patient and patient's home pharmacies ([facility])  Allergies reviewed    [facility] reported patient received a 7 day course of Keflex on 8/2/2022 and a Zpak on 8/22/2022, patient unable to verify whether or not she finished these

## 2022-09-03 ENCOUNTER — APPOINTMENT (OUTPATIENT)
Dept: RADIOLOGY | Facility: MEDICAL CENTER | Age: 47
End: 2022-09-03
Attending: HOSPITALIST
Payer: MEDICAID

## 2022-09-03 VITALS
HEART RATE: 65 BPM | BODY MASS INDEX: 53.47 KG/M2 | TEMPERATURE: 97 F | SYSTOLIC BLOOD PRESSURE: 90 MMHG | DIASTOLIC BLOOD PRESSURE: 54 MMHG | OXYGEN SATURATION: 99 % | HEIGHT: 62 IN | WEIGHT: 290.57 LBS | RESPIRATION RATE: 18 BRPM

## 2022-09-03 PROCEDURE — 99217 PR OBSERVATION CARE DISCHARGE: CPT | Performed by: HOSPITALIST

## 2022-09-03 PROCEDURE — 96372 THER/PROPH/DIAG INJ SC/IM: CPT

## 2022-09-03 PROCEDURE — 700102 HCHG RX REV CODE 250 W/ 637 OVERRIDE(OP): Performed by: HOSPITALIST

## 2022-09-03 PROCEDURE — A9270 NON-COVERED ITEM OR SERVICE: HCPCS | Performed by: HOSPITALIST

## 2022-09-03 PROCEDURE — 94660 CPAP INITIATION&MGMT: CPT

## 2022-09-03 PROCEDURE — 700111 HCHG RX REV CODE 636 W/ 250 OVERRIDE (IP): Performed by: HOSPITALIST

## 2022-09-03 PROCEDURE — G0378 HOSPITAL OBSERVATION PER HR: HCPCS

## 2022-09-03 PROCEDURE — 70551 MRI BRAIN STEM W/O DYE: CPT

## 2022-09-03 RX ADMIN — ARIPIPRAZOLE 30 MG: 15 TABLET ORAL at 06:22

## 2022-09-03 RX ADMIN — FAMOTIDINE 20 MG: 20 TABLET, FILM COATED ORAL at 06:21

## 2022-09-03 RX ADMIN — BUDESONIDE AND FORMOTEROL FUMARATE DIHYDRATE 2 PUFF: 160; 4.5 AEROSOL RESPIRATORY (INHALATION) at 09:10

## 2022-09-03 RX ADMIN — ENOXAPARIN SODIUM 30 MG: 30 INJECTION SUBCUTANEOUS at 06:22

## 2022-09-03 RX ADMIN — ASPIRIN 325 MG: 325 TABLET ORAL at 06:21

## 2022-09-03 ASSESSMENT — PAIN DESCRIPTION - PAIN TYPE: TYPE: ACUTE PAIN

## 2022-09-03 NOTE — DISCHARGE PLANNING
Call from Dr Unger stating pt needs a ride home. Pt has Medicaid and CM asked him to have the BSN give him the [place] flyer.

## 2022-09-03 NOTE — CARE PLAN
Problem: Optimal Care of the Stroke Patient  Goal: Optimal emergency care for the stroke patient  Outcome: Met  Goal: Optimal acute care for the stroke patient  Outcome: Met     Problem: Knowledge Deficit - Stroke Education  Goal: Patient's knowledge of stroke and risk factors will improve  Outcome: Met     Problem: Psychosocial - Patient Condition  Goal: Patient's ability to verbalize feelings about condition will improve  Outcome: Met  Goal: Patient's ability to re-evaluate and adapt role responsibilities will improve  Outcome: Met     Problem: Discharge Planning - Stroke  Goal: Ensure Stroke Core Measures are met prior to discharge  Outcome: Met  Goal: Patient’s continuum of care needs will be met  Outcome: Met     Problem: Neuro Status  Goal: Neuro status will remain stable or improve  Outcome: Met     Problem: Hemodynamic Monitoring  Goal: Patient's hemodynamics, fluid balance and neurologic status will be stable or improve  Outcome: Met     Problem: Respiratory - Stroke Patient  Goal: Patient will achieve/maintain optimum respiratory rate/effort  Outcome: Met     Problem: Dysphagia  Goal: Dysphagia will improve  Outcome: Met     Problem: Risk for Aspiration  Goal: Patient's risk for aspiration will be absent or decrease  Outcome: Met     Problem: Urinary Elimination  Goal: Establish and maintain regular urinary output  Outcome: Met     Problem: Bowel Elimination  Goal: Establish and maintain regular bowel function  Outcome: Met     Problem: Mobility - Stroke  Goal: Patient's capacity to carry out activities will improve  Outcome: Met  Goal: Spasticity will be prevented or improved  Outcome: Met  Goal: Subluxation will be prevented or improved  Outcome: Met     Problem: Self Care  Goal: Patient will have the ability to perform ADLs independently or with assistance (bathe, groom, dress, toilet and feed)  Outcome: Met     Problem: Knowledge Deficit - Standard  Goal: Patient and family/care givers will  demonstrate understanding of plan of care, disease process/condition, diagnostic tests and medications  Outcome: Met   The patient is Stable - Low risk of patient condition declining or worsening    Shift Goals  Clinical Goals: DC home  Patient Goals: rest  Family Goals: n/a    Progress made toward(s) clinical / shift goals:  YEs Pt going back home via [facility] transportation    Patient is not progressing towards the following goals:

## 2022-09-03 NOTE — PROGRESS NOTES
Report received from ER RN. Pt brought in via [facility]. AAOx4 pt is bed,  Assessment completed. VSS. Denies pain, good CMS and pulses to robert LE, denies numbness and tingling. Pt was update for the care for the day. White board updated, All question answered. Pt has call light within reach,  bed is in the lowest position. Pt has no other needs at this time.

## 2022-09-03 NOTE — ASSESSMENT & PLAN NOTE
The 10-year ASCVD risk score (Luis GAUTHIER Jr., et al., 2013) is: 1.2%    Values used to calculate the score:      Age: 47 years      Sex: Female      Is Non- : No      Diabetic: No      Tobacco smoker: No      Systolic Blood Pressure: 122 mmHg      Is BP treated: No      HDL Cholesterol: 48 mg/dL      Total Cholesterol: 198 mg/dL     Serial trop   echo  2/22 mild AS EF 70%  Asa   Negative nuclear medicine stress test Feb 2022

## 2022-09-03 NOTE — THERAPY
PT Contact Note    PT Consult received/acknowledged. Discussed with nsg, MRI negative for acute findings and pt does not need to be seen by PT services. Pt also observed amb up to transport chair while actively DCing using 4WW safely. PT Consult not completed at this time.    Carrol Grijalva, PT, DPT  Ext. [phone]

## 2022-09-03 NOTE — DISCHARGE PLANNING
[facility] Transitional Care Coordination    Referral from: Dr. Enrique Sorensen    Insurance Provider on Facesheet: [facility] FFS    Potential Rehab Diagnosis: TBD    Chart review indicates patient may have on going medical management and may have therapy needs to possibly meet inpatient rehab facility criteria with the goal of returning to community.    D/C support will need to be verified: Mother    Physiatry consultation pended per protocol.  W/U & TX pending.      Thank you for the referral.

## 2022-09-03 NOTE — ASSESSMENT & PLAN NOTE
7-second sinus pause reviewed strip under media tab  Telemetry monitoring  Patient denies syncope  Consult cardiology discussed with Dr. Arreguin  She is not on any AV john blocking agents

## 2022-09-03 NOTE — CARE PLAN
The patient is Watcher - Medium risk of patient condition declining or worsening  Watcher - Medium risk of patient condition declining or worsening  Shift Goals  Clinical Goals: MRI, Cpap  Patient Goals: rest  Family Goals: n/a    Progress made toward(s) clinical / shift goals:    Problem: Self Care  Goal: Patient will have the ability to perform ADLs independently or with assistance (bathe, groom, dress, toilet and feed)  Outcome: Progressing     Problem: Knowledge Deficit - Standard  Goal: Patient and family/care givers will demonstrate understanding of plan of care, disease process/condition, diagnostic tests and medications  Outcome: Progressing       Patient is not progressing towards the following goals:

## 2022-09-03 NOTE — CONSULTS
Cardiology Consult Note:    Alyssa Arreguin M.D.  Date & Time note created:    9/2/2022   6:07 PM     Referring MD:  Dr. Enrique Sorensen    Patient ID:   Name:             Lorene Haro   YOB: 1975  Age:                 47 y.o.  female   MRN:               [id number]                                                             Chief Complaint / Reason for consult:  Sinus pause    History of Present Illness:    This is a 47 years old woman with prior history of obstructive sleep apnea on CPAP machine, obesity, current smoker, questionable paroxysmal atrial fibrillation, presented to the hospital after being found with 7-second pause on loop recorder monitoring.  Patient did not pass out at that time.    I personally interpreted the tracing, EKG tracings along with images of transthoracic echocardiograms, blood test results    Of note, patient does report of having occasional palpitation.    Of note, MD event noted on her event monitor have been during her sleep in the morning.  Patient also has sleep apnea and is not using CPAP machine for treatment.    Review of Systems:      Constitutional: Denies fevers, Denies weight changes  Eyes: Denies changes in vision, no eye pain  Ears/Nose/Throat/Mouth: Denies nasal congestion or sore throat   Cardiovascular: no chest pain, yes palpitations   Respiratory: no shortness of breath , Denies cough  Gastrointestinal/Hepatic: Denies abdominal pain, nausea, vomiting, diarrhea, constipation or GI bleeding   Genitourinary: Denies dysuria or frequency  Musculoskeletal/Rheum: Denies  joint pain and swelling   Skin: Denies rash  Neurological: Denies headache, confusion, memory loss or focal weakness/parasthesias  Psychiatric: denies mood disorder   Endocrine: Jacquie thyroid problems  Heme/Oncology/Lymph Nodes: Denies enlarged lymph nodes, denies brusing or known bleeding disorder  All other systems were reviewed and are negative (AMA/CMS criteria)                Past  Medical History:   Past Medical History:   Diagnosis Date    A-fib (Formerly [facility])     Asthma     Bipolar 2 disorder (Formerly [facility])     Chickenpox     Hypertension     On home oxygen therapy     Other psychological stress     Schizophrenic disorder (Formerly [facility])     Yeast infection of the skin 04/01/2021     Active Hospital Problems    Diagnosis     KATHIA (obstructive sleep apnea) [G47.33]      Priority: High    Bipolar Disorder [F31.9]      Priority: Low    Chest pain [R07.9]     COPD (chronic obstructive pulmonary disease) (Formerly [facility]) [J44.9]     Sinus pause [I45.5]     Prediabetes [R73.03]        Past Surgical History:  Past Surgical History:   Procedure Laterality Date    CHOLECYSTECTOMY      COLECTOMY      HYSTERECTOMY LAPAROSCOPY      KNEE ARTHROSCOPY Left        Hospital Medications:    Current Facility-Administered Medications:     [START ON 9/3/2022] ARIPiprazole (Abilify) tablet 30 mg, 30 mg, Oral, QAM, Stanislav Sorensen M.D.    atorvastatin (LIPITOR) tablet 20 mg, 20 mg, Oral, QHS, Stanislav Sorensen M.D.    budesonide-formoterol (SYMBICORT) 160-4.5 MCG/ACT inhaler 2 Puff, 2 Puff, Inhalation, BID, Stanislav Sorensen M.D.    famotidine (PEPCID) tablet 20 mg, 20 mg, Oral, BID, Stanislav Sorensen M.D.    montelukast (SINGULAIR) tablet 10 mg, 10 mg, Oral, QHS, Stanislav Sorensen M.D.    nitroglycerin (NITROSTAT) tablet 0.4 mg, 0.4 mg, Sublingual, PRN, Stanislav Sorensen M.D.    traZODone (DESYREL) tablet 50 mg, 50 mg, Oral, QHS, Stanislav Sorensen M.D.    senna-docusate (PERICOLACE or SENOKOT S) 8.6-50 MG per tablet 2 Tablet, 2 Tablet, Oral, BID **AND** polyethylene glycol/lytes (MIRALAX) PACKET 1 Packet, 1 Packet, Oral, QDAY PRN **AND** magnesium hydroxide (MILK OF MAGNESIA) suspension 30 mL, 30 mL, Oral, QDAY PRN **AND** bisacodyl (DULCOLAX) suppository 10 mg, 10 mg, Rectal, QDAY PRN, Stanislav Sorensen M.D.    enoxaparin (Lovenox) inj 30 mg, 30 mg, Subcutaneous, Q12HRS, Stanislav Sorensen M.D.    acetaminophen (Tylenol)  tablet 650 mg, 650 mg, Oral, Q6HRS PRN, Stanislav Sorensen M.D.    aspirin (ASA) tablet 325 mg, 325 mg, Oral, DAILY **OR** aspirin (ASA) chewable tab 324 mg, 324 mg, Oral, DAILY **OR** aspirin (ASA) suppository 300 mg, 300 mg, Rectal, DAILY, Stanislav Sorensen M.D.    Current Outpatient Medications:  Medications Prior to Admission   Medication Sig Dispense Refill Last Dose    azithromycin (ZITHROMAX Z-YESY) 250 MG Tab Take 250-500 mg by mouth every day. Take 2 tabs on day 1, then 1 tablet daily on days 2-5  Prescribed 8/22/2022   UNK at UNK    cephALEXin (KEFLEX) 500 MG Cap Take 500 mg by mouth 4 times a day. 7 DAY COURSE PRESCRIBED 8/2/2022   UNK at UNK    montelukast (SINGULAIR) 10 MG Tab Take 1 Tablet by mouth at bedtime for 30 days. 30 Tablet 3 9/1/2022 at 1930    traZODone (DESYREL) 50 MG Tab Take 1 Tablet by mouth at bedtime. 30 Tablet 3 9/1/2022 at 1930    famotidine (PEPCID) 20 MG Tab Take 1 Tablet by mouth 2 times a day. 60 Tablet 0 NEW RX at NOT STARTED    budesonide-formoterol (SYMBICORT) 160-4.5 MCG/ACT Aerosol Inhale 2 Puffs 2 times a day for 30 days. 6 g 3 UNK at UNK    atorvastatin (LIPITOR) 20 MG Tab Take 20 mg by mouth at bedtime.   9/1/2022 at 1930    nitroglycerin (NITROSTAT) 0.4 MG SL Tab Place 1 tablet under the tongue as needed for Chest Pain. 25 tablet 0 >WEEK at UNK    aripiprazole (ABILIFY) 30 MG tablet Take 30 mg by mouth every morning.   9/2/2022 at 0800       Medication Allergy:  Allergies   Allergen Reactions    Penicillin G Rash and Itching     pt reports that she gets a rash all over her body and gets itchy    Amoxicillin Rash and Itching     Tolerates cephalosporins, pt reports that she gets a rash all over her body and gets itchy       Family History:  Family History   Problem Relation Age of Onset    No Known Problems Mother     Cancer Sister        Social History:  Social History     Socioeconomic History    Marital status: Single     Spouse name: Not on file    Number of  "children: Not on file    Years of education: Not on file    Highest education level: Not on file   Occupational History    Not on file   Tobacco Use    Smoking status: Former    Smokeless tobacco: Never   Vaping Use    Vaping Use: Never used   Substance and Sexual Activity    Alcohol use: Never    Drug use: Never    Sexual activity: Not Currently   Other Topics Concern    Not on file   Social History Narrative    ** Merged History Encounter **         [facility]     Financial Resource Strain: Medium Risk    Difficulty of Paying Living Expenses: Somewhat hard   Food Insecurity: Food Insecurity Present    Worried About Running Out of Food in the Last Year: Often true    Ran Out of Food in the Last Year: Often true   Transportation Needs: No Transportation Needs    Lack of Transportation (Medical): No    Lack of Transportation (Non-Medical): No   Physical Activity: Not on file   Stress: Not on file   Social Connections: Not on file   Intimate Partner Violence: Not on file   Housing Stability: Not on file         Physical Exam:  Vitals/ General Appearance:   Weight/BMI: Body mass index is 53.15 kg/m².  /61   Pulse 78   Temp 36.1 °C (96.9 °F) (Temporal)   Resp 20   Ht 1.575 m (5' 2\")   Wt (!) 132 kg (290 lb 9.1 oz)   SpO2 96%   Vitals:    09/02/22 1356 09/02/22 1743   BP: 122/77 113/61   Pulse: 80 78   Resp: 20 20   Temp: 36.4 °C (97.6 °F) 36.1 °C (96.9 °F)   TempSrc: Temporal Temporal   SpO2: 94% 96%   Weight: (!) 135 kg (298 lb) (!) 132 kg (290 lb 9.1 oz)   Height: 1.575 m (5' 2\") 1.575 m (5' 2\")     Oxygen Therapy:  Pulse Oximetry: 96 %, O2 (LPM): 2, O2 Delivery Device: Nasal Cannula    Constitutional:   No acute distress  HENMT:  Normocephalic, Atraumatic.  Eyes:  EOMI, No discharge.  Neck:  no JVD.  Cardiovascular:  Normal heart rate, Normal rhythm.  Extremitites with intact distal pulses, no cyanosis, or edema.  Lungs:  No respiratory distress.  Abdomen: Soft, No " tenderness, No guarding, No rebound.  Skin: No significant rash.  Neurologic: Alert & oriented x 3, No focal deficits noted, cranial nerves II through X are intact.  Psychiatric: Affect normal, Judgment normal, Mood normal.      MDM (Data Review):     Records reviewed and summarized in current documentation    Lab Data Review:  Recent Results (from the past 24 hour(s))   CBC WITH DIFFERENTIAL    Collection Time: 09/02/22  2:00 PM   Result Value Ref Range    WBC 15.5 (H) 4.8 - 10.8 K/uL    RBC 4.71 4.20 - 5.40 M/uL    Hemoglobin 12.7 12.0 - 16.0 g/dL    Hematocrit 40.9 37.0 - 47.0 %    MCV 86.8 81.4 - 97.8 fL    MCH 27.0 27.0 - 33.0 pg    MCHC 31.1 (L) 33.6 - 35.0 g/dL    RDW 53.6 (H) 35.9 - 50.0 fL    Platelet Count 254 164 - 446 K/uL    MPV 11.9 9.0 - 12.9 fL    Neutrophils-Polys 81.90 (H) 44.00 - 72.00 %    Lymphocytes 10.30 (L) 22.00 - 41.00 %    Monocytes 6.60 0.00 - 13.40 %    Eosinophils 0.30 0.00 - 6.90 %    Basophils 0.10 0.00 - 1.80 %    Immature Granulocytes 0.80 0.00 - 0.90 %    Nucleated RBC 0.00 /100 WBC    Neutrophils (Absolute) 12.70 (H) 2.00 - 7.15 K/uL    Lymphs (Absolute) 1.60 1.00 - 4.80 K/uL    Monos (Absolute) 1.02 (H) 0.00 - 0.85 K/uL    Eos (Absolute) 0.05 0.00 - 0.51 K/uL    Baso (Absolute) 0.02 0.00 - 0.12 K/uL    Immature Granulocytes (abs) 0.12 (H) 0.00 - 0.11 K/uL    NRBC (Absolute) 0.00 K/uL   COMP METABOLIC PANEL    Collection Time: 09/02/22  2:00 PM   Result Value Ref Range    Sodium 139 135 - 145 mmol/L    Potassium 4.0 3.6 - 5.5 mmol/L    Chloride 102 96 - 112 mmol/L    Co2 27 20 - 33 mmol/L    Anion Gap 10.0 7.0 - 16.0    Glucose 125 (H) 65 - 99 mg/dL    Bun 14 8 - 22 mg/dL    Creatinine 0.92 0.50 - 1.40 mg/dL    Calcium 9.4 8.5 - 10.5 mg/dL    AST(SGOT) 7 (L) 12 - 45 U/L    ALT(SGPT) 9 2 - 50 U/L    Alkaline Phosphatase 101 (H) 30 - 99 U/L    Total Bilirubin 1.9 (H) 0.1 - 1.5 mg/dL    Albumin 4.0 3.2 - 4.9 g/dL    Total Protein 6.7 6.0 - 8.2 g/dL    Globulin 2.7 1.9 - 3.5 g/dL     A-G Ratio 1.5 g/dL   PROTHROMBIN TIME    Collection Time: 22  2:00 PM   Result Value Ref Range    PT 13.5 12.0 - 14.6 sec    INR 1.04 0.87 - 1.13   APTT    Collection Time: 22  2:00 PM   Result Value Ref Range    APTT 26.6 24.7 - 36.0 sec   COD (ADULT)    Collection Time: 22  2:00 PM   Result Value Ref Range    ABO Grouping Only A     Rh Grouping Only POS     Antibody Screen-Cod NEG    TROPONIN    Collection Time: 22  2:00 PM   Result Value Ref Range    Troponin T 22 (H) 6 - 19 ng/L   ESTIMATED GFR    Collection Time: 22  2:00 PM   Result Value Ref Range    GFR (CKD-EPI) 76 >60 mL/min/1.73 m 2   EKG    Collection Time: 22  2:02 PM   Result Value Ref Range    Report       [facility]    Test Date:  2022  Pt Name:    ADELINA MILLAN                Department: ER  MRN:        [id number]                      Room:       [facility]  Gender:     Female                       Technician: [id number]  :        1975                   Requested By:ER TRIAGE PROTOCOL  Order #:    [id number]                    Reading MD: Claribel Cortez    Measurements  Intervals                                Axis  Rate:       79                           P:          66  MD:         121                          QRS:        63  QRSD:       104                          T:          -6  QT:         375  QTc:        430    Interpretive Statements  Sinus rhythm rate 79  Normal axis  Normal intervals  Increased baseline artifact  No ST elevation or depression  Borderline T abnormalities, inferior leads  Compared to ECG 2022 06:11:29  T-wave abnormality now present  Atrial premature complex(es) no longer present  Electronically Signed On 2022 16:45:2 3 PDT by Claribel Cortez     CoV-2, FLU A/B, and RSV by PCR (2-4 Hours CEPHEID) : Collect NP swab in VTM    Collection Time: 22  3:51 PM    Specimen: Respirate   Result Value Ref Range    Influenza virus A RNA Negative Negative     Influenza virus B, PCR Negative Negative    RSV, PCR Negative Negative    SARS-CoV-2 by PCR NotDetected     SARS-CoV-2 Source NP Swab    EKG (NOW)    Collection Time: 22  5:59 PM   Result Value Ref Range    Report       [facility]    Test Date:  2022  Pt Name:    ADELINA MILLAN                Department: ER  MRN:        [id number]                      Room:       [facility]  Gender:     Female                       Technician: DENISE  :        1975                   Requested By:STACEY DUMONT  Order #:    [id number]                    Reading MD:    Measurements  Intervals                                Axis  Rate:       78                           P:          58  DC:         129                          QRS:        72  QRSD:       98                           T:          -2  QT:         369  QTc:        421    Interpretive Statements  Sinus rhythm  Probable left atrial enlargement  Low voltage, precordial leads  Borderline T abnormalities, inferior leads  ST elev, probable normal early repol pattern  Compared to ECG 2022 14:02:29  Low QRS voltage now present  T-wave abnormality still present  ST (T wave) deviation still present         Imaging/Procedures Review:    Chest Xray:  Reviewed    EKG:   As in HPI.     MDM (Assessment and Plan):     Active Hospital Problems    Diagnosis     KATHIA (obstructive sleep apnea) [G47.33]      Priority: High    Bipolar Disorder [F31.9]      Priority: Low    Chest pain [R07.9]     COPD (chronic obstructive pulmonary disease) (HCC) [J44.9]     Sinus pause [I45.5]     Prediabetes [R73.03]          At this time, sinus pauses likely related to untreated obstructive sleep apnea.  There is no indication for permanent pacemaker placement at this time.  Continue to monitor clinically.  Optimize treatment for sleep apnea.    Will sign off at this time, please call us with further questions.  Patient will be followed in the outpatient cardiology clinic for further cardiac  care.    Thank you for referring this patient to our cardiology service.    Alyssa Arreguin MD.   [facility].

## 2022-09-03 NOTE — CARE PLAN
Problem: Hemodynamic Monitoring  Goal: Patient's hemodynamics, fluid balance and neurologic status will be stable or improve  Outcome: Progressing     Problem: Dysphagia  Goal: Dysphagia will improve  Outcome: Progressing   The patient is Stable - Low risk of patient condition declining or worsening         Progress made toward(s) clinical / shift goals:  Pt able to eat regular diet and drink fluids no s/sx of chocking, aspiration, no cough    Patient is not progressing towards the following goals:

## 2022-09-03 NOTE — DISCHARGE SUMMARY
Discharge Summary    CHIEF COMPLAINT ON ADMISSION  Chief Complaint   Patient presents with    Tingling     Started on L pinky around 1230 and radiates up her arm     Headache     Started 10 min PTA while in ambulance       Reason for Admission  EMS     Admission Date  9/2/2022    CODE STATUS  Full Code    HPI & HOSPITAL COURSE  Ms. Lorene Timmons is a 47-year-old female who was told to come into the hospital because of a 7-second pause on her loop recorder while she was sleeping.  Patient was admitted to the clinical decision unit and cardiology was consulted.  Cardiology at this point has evaluated the patient they find that the 7-second pauses secondary to untreated sleep apnea.  Patient apparently has been noncompliant with her sleep apnea management as an outpatient.  Patient at this point has been evaluated on her medications and currently is not taking any medications that would cause sinus pauses or bradycardia.  At this point the recommendation was for the patient to follow-up with the cardiology clinic.  Patient otherwise is asymptomatic.  Labs at this point have been monitored overnight.  Troponin has not elevated above 24.  The patient's EKG was evaluated and shows no ischemic changes.  At this point patient we discharged home with outpatient follow-ups and monitoring.  Most recent echocardiogram is 70% ejection fraction with normal function.  Her cardiac score at this point is 1.2% patient did have a nuclear medicine stress test in February 2022 that was negative.  Patient does have morbid obesity with a BMI of 53 and at this point lifestyle modification as well as outpatient weight loss management program is highly recommended.  Patient currently is alert awake oriented x3 pleasant cooperative female can discharge home with outpatient follow-ups and monitoring.    Therefore, she is discharged in good and stable condition to home with close outpatient follow-up.      Discharge Date  9/3/2022    FOLLOW  UP ITEMS POST DISCHARGE  Follow-up with cardiology in 2 to 3 days  Follow-up with primary care physician in 2 to 3 days    DISCHARGE DIAGNOSES  Principal Problem:    Chest pain POA: Yes  Active Problems:    Bipolar Disorder POA: Yes    KATHIA (obstructive sleep apnea) POA: Yes    Weakness POA: Yes    Morbid obesity (HCC) POA: Yes    Prediabetes POA: Yes    COPD (chronic obstructive pulmonary disease) (HCC) POA: Unknown    Sinus pause POA: Unknown    Elevated LFTs POA: Unknown  Resolved Problems:    * No resolved hospital problems. *      FOLLOW UP  Future Appointments   Date Time Provider Department Center   9/23/2022  9:40 AM Nader Barraza M.D. [facility] None   11/17/2022  1:00 PM Oz Borrego M.D. [facility] None     No follow-up provider specified.    MEDICATIONS ON DISCHARGE     Medication List        CONTINUE taking these medications        Instructions   aripiprazole 30 MG tablet  Commonly known as: ABILIFY   Take 30 mg by mouth every morning.  Dose: 30 mg     atorvastatin 20 MG Tabs  Commonly known as: LIPITOR   Take 20 mg by mouth at bedtime.  Dose: 20 mg     famotidine 20 MG Tabs  Commonly known as: PEPCID   Take 1 Tablet by mouth 2 times a day.  Dose: 20 mg     montelukast 10 MG Tabs  Commonly known as: SINGULAIR   Take 1 Tablet by mouth at bedtime for 30 days.  Dose: 10 mg     nitroglycerin 0.4 MG Subl  Commonly known as: NITROSTAT   Place 1 tablet under the tongue as needed for Chest Pain.  Dose: 0.4 mg     Symbicort 160-4.5 MCG/ACT Aero  Generic drug: budesonide-formoterol   Inhale 2 Puffs 2 times a day for 30 days.  Dose: 2 Puff     traZODone 50 MG Tabs  Commonly known as: DESYREL   Take 1 Tablet by mouth at bedtime.  Dose: 50 mg            STOP taking these medications      cephALEXin 500 MG Caps  Commonly known as: KEFLEX     Zithromax Z-Romario 250 MG Tabs  Generic drug: azithromycin              Allergies  Allergies   Allergen Reactions    Penicillin G Rash and Itching     pt reports that she gets a rash all  over her body and gets itchy    Amoxicillin Rash and Itching     Tolerates cephalosporins, pt reports that she gets a rash all over her body and gets itchy       DIET  Orders Placed This Encounter   Procedures    Diet Order Diet: Cardiac     Standing Status:   Standing     Number of Occurrences:   1     Order Specific Question:   Diet:     Answer:   Cardiac [6]       ACTIVITY  As tolerated.  Weight bearing as tolerated    CONSULTATIONS  Cardiology  To    PROCEDURES  None    LABORATORY  Lab Results   Component Value Date    SODIUM 139 09/02/2022    POTASSIUM 4.0 09/02/2022    CHLORIDE 102 09/02/2022    CO2 27 09/02/2022    GLUCOSE 125 (H) 09/02/2022    BUN 14 09/02/2022    CREATININE 0.92 09/02/2022    CREATININE 0.9 09/11/2008        Lab Results   Component Value Date    WBC 15.5 (H) 09/02/2022    HEMOGLOBIN 12.7 09/02/2022    HEMATOCRIT 40.9 09/02/2022    PLATELETCT 254 09/02/2022        Total time of the discharge process exceeds 34 minutes.

## 2022-09-03 NOTE — PROGRESS NOTES
4 Eyes Skin Assessment Completed by JC Quan and JC Pham.    Head WDL  Ears WDL  Nose WDL  Mouth WDL  Neck WDL  Breast/Chest Redness (left breast)  Shoulder Blades WDL  Spine WDL  (R) Arm/Elbow/Hand WDL  (L) Arm/Elbow/Hand WDL  Abdomen Redness, Dry      Groin Redness, Dry  Scrotum/Coccyx/Buttocks WDL  (R) Leg Redness and Edema Dry      (L) Leg Redness and Edema, Dry      (R) Heel/Foot/Toe Redness and Edema, Dry  (L) Heel/Foot/Toe Redness and Edema, Dry          Devices In Places Tele Box, Blood Pressure Cuff, and Pulse Ox      Interventions In Place Pillows, InterDry, barrier cream.     Possible Skin Injury No    Pictures Uploaded Into Epic Yes  Wound Consult Placed N/A  RN Wound Prevention Protocol Ordered No

## 2022-09-03 NOTE — PROGRESS NOTES
Report received from night shift RN. Assume care. Pt. AAOx4 pt is bed,  Assessment completed. VSS. Denies pain, able to wiggle toes and dorsi/plantar flex feet, good CMS and pulses to robert LE, denies numbness and tingling. Dressing in place DCI, Pt was update for the care for the day. White board updated, All question answered. Pt has call light within reach,  bed is in the lowest position. Pt has no other needs at this time.

## 2022-09-03 NOTE — PROGRESS NOTES
RN called into patients room by .  explained pt was sleeping and tapped her shoulder to wake her up for lab draw. PT refusing labs at this time stating she would like them drawn later on this morning.

## 2022-09-07 ENCOUNTER — PATIENT OUTREACH (OUTPATIENT)
Dept: HEALTH INFORMATION MANAGEMENT | Facility: OTHER | Age: 47
End: 2022-09-07
Payer: MEDICAID

## 2022-09-07 NOTE — PROGRESS NOTES
CHW called the patient to follow up. Pt states that she is feeling better and does not have any symptoms.   Pt states that she has an appointment tomorrow at 2pm. CHW unable to hear who the appointment is with due to background noise.   Patient asked CHW to schedule a HF with Dr Sharp.   Pt has been scheduled on 11/14/2022 @ 2:30pm.   CHW called the patient back but was unsuccessful. CHW left a detailed message.     CHW will attempt again.

## 2022-09-10 ENCOUNTER — HOSPITAL ENCOUNTER (EMERGENCY)
Facility: MEDICAL CENTER | Age: 47
End: 2022-09-10
Attending: EMERGENCY MEDICINE
Payer: MEDICAID

## 2022-09-10 ENCOUNTER — APPOINTMENT (OUTPATIENT)
Dept: RADIOLOGY | Facility: MEDICAL CENTER | Age: 47
End: 2022-09-10
Attending: EMERGENCY MEDICINE
Payer: MEDICAID

## 2022-09-10 VITALS
HEIGHT: 62 IN | WEIGHT: 289 LBS | HEART RATE: 69 BPM | OXYGEN SATURATION: 97 % | DIASTOLIC BLOOD PRESSURE: 73 MMHG | TEMPERATURE: 96.6 F | BODY MASS INDEX: 53.18 KG/M2 | SYSTOLIC BLOOD PRESSURE: 129 MMHG | RESPIRATION RATE: 18 BRPM

## 2022-09-10 DIAGNOSIS — M79.89 PAIN AND SWELLING OF LEFT LOWER LEG: ICD-10-CM

## 2022-09-10 DIAGNOSIS — M79.662 PAIN AND SWELLING OF LEFT LOWER LEG: ICD-10-CM

## 2022-09-10 DIAGNOSIS — E66.01 MORBID OBESITY (HCC): ICD-10-CM

## 2022-09-10 DIAGNOSIS — M54.32 SCIATICA OF LEFT SIDE: ICD-10-CM

## 2022-09-10 LAB
ALBUMIN SERPL BCP-MCNC: 3.6 G/DL (ref 3.2–4.9)
ALBUMIN/GLOB SERPL: 1.4 G/DL
ALP SERPL-CCNC: 101 U/L (ref 30–99)
ALT SERPL-CCNC: 12 U/L (ref 2–50)
ANION GAP SERPL CALC-SCNC: 8 MMOL/L (ref 7–16)
APPEARANCE UR: CLEAR
APTT PPP: 29.6 SEC (ref 24.7–36)
AST SERPL-CCNC: 8 U/L (ref 12–45)
BASOPHILS # BLD AUTO: 0.3 % (ref 0–1.8)
BASOPHILS # BLD: 0.03 K/UL (ref 0–0.12)
BILIRUB SERPL-MCNC: 0.7 MG/DL (ref 0.1–1.5)
BILIRUB UR QL STRIP.AUTO: NEGATIVE
BUN SERPL-MCNC: 9 MG/DL (ref 8–22)
CALCIUM SERPL-MCNC: 8.8 MG/DL (ref 8.5–10.5)
CHLORIDE SERPL-SCNC: 104 MMOL/L (ref 96–112)
CO2 SERPL-SCNC: 25 MMOL/L (ref 20–33)
COLOR UR: YELLOW
CREAT SERPL-MCNC: 0.8 MG/DL (ref 0.5–1.4)
EOSINOPHIL # BLD AUTO: 0.14 K/UL (ref 0–0.51)
EOSINOPHIL NFR BLD: 1.4 % (ref 0–6.9)
ERYTHROCYTE [DISTWIDTH] IN BLOOD BY AUTOMATED COUNT: 57.3 FL (ref 35.9–50)
GFR SERPLBLD CREATININE-BSD FMLA CKD-EPI: 90 ML/MIN/1.73 M 2
GLOBULIN SER CALC-MCNC: 2.6 G/DL (ref 1.9–3.5)
GLUCOSE SERPL-MCNC: 93 MG/DL (ref 65–99)
GLUCOSE UR STRIP.AUTO-MCNC: NEGATIVE MG/DL
HCT VFR BLD AUTO: 36.8 % (ref 37–47)
HGB BLD-MCNC: 11.3 G/DL (ref 12–16)
IMM GRANULOCYTES # BLD AUTO: 0.03 K/UL (ref 0–0.11)
IMM GRANULOCYTES NFR BLD AUTO: 0.3 % (ref 0–0.9)
INR PPP: 1.09 (ref 0.87–1.13)
KETONES UR STRIP.AUTO-MCNC: NEGATIVE MG/DL
LEUKOCYTE ESTERASE UR QL STRIP.AUTO: NEGATIVE
LYMPHOCYTES # BLD AUTO: 1.7 K/UL (ref 1–4.8)
LYMPHOCYTES NFR BLD: 17.2 % (ref 22–41)
MCH RBC QN AUTO: 27.4 PG (ref 27–33)
MCHC RBC AUTO-ENTMCNC: 30.7 G/DL (ref 33.6–35)
MCV RBC AUTO: 89.3 FL (ref 81.4–97.8)
MICRO URNS: NORMAL
MONOCYTES # BLD AUTO: 0.58 K/UL (ref 0–0.85)
MONOCYTES NFR BLD AUTO: 5.9 % (ref 0–13.4)
NEUTROPHILS # BLD AUTO: 7.39 K/UL (ref 2–7.15)
NEUTROPHILS NFR BLD: 74.9 % (ref 44–72)
NITRITE UR QL STRIP.AUTO: NEGATIVE
NRBC # BLD AUTO: 0 K/UL
NRBC BLD-RTO: 0 /100 WBC
PH UR STRIP.AUTO: 5.5 [PH] (ref 5–8)
PLATELET # BLD AUTO: 218 K/UL (ref 164–446)
PMV BLD AUTO: 11.2 FL (ref 9–12.9)
POTASSIUM SERPL-SCNC: 4.2 MMOL/L (ref 3.6–5.5)
PROT SERPL-MCNC: 6.2 G/DL (ref 6–8.2)
PROT UR QL STRIP: NEGATIVE MG/DL
PROTHROMBIN TIME: 14 SEC (ref 12–14.6)
RBC # BLD AUTO: 4.12 M/UL (ref 4.2–5.4)
RBC UR QL AUTO: NEGATIVE
SODIUM SERPL-SCNC: 137 MMOL/L (ref 135–145)
SP GR UR STRIP.AUTO: 1.01
UROBILINOGEN UR STRIP.AUTO-MCNC: 0.2 MG/DL
WBC # BLD AUTO: 9.9 K/UL (ref 4.8–10.8)

## 2022-09-10 PROCEDURE — 81003 URINALYSIS AUTO W/O SCOPE: CPT

## 2022-09-10 PROCEDURE — 96374 THER/PROPH/DIAG INJ IV PUSH: CPT

## 2022-09-10 PROCEDURE — 36415 COLL VENOUS BLD VENIPUNCTURE: CPT

## 2022-09-10 PROCEDURE — 700111 HCHG RX REV CODE 636 W/ 250 OVERRIDE (IP): Performed by: EMERGENCY MEDICINE

## 2022-09-10 PROCEDURE — 85730 THROMBOPLASTIN TIME PARTIAL: CPT

## 2022-09-10 PROCEDURE — 93971 EXTREMITY STUDY: CPT | Mod: LT

## 2022-09-10 PROCEDURE — 85610 PROTHROMBIN TIME: CPT

## 2022-09-10 PROCEDURE — 80053 COMPREHEN METABOLIC PANEL: CPT

## 2022-09-10 PROCEDURE — 700105 HCHG RX REV CODE 258: Performed by: EMERGENCY MEDICINE

## 2022-09-10 PROCEDURE — 85025 COMPLETE CBC W/AUTO DIFF WBC: CPT

## 2022-09-10 PROCEDURE — 96375 TX/PRO/DX INJ NEW DRUG ADDON: CPT

## 2022-09-10 PROCEDURE — 99285 EMERGENCY DEPT VISIT HI MDM: CPT

## 2022-09-10 RX ORDER — KETOROLAC TROMETHAMINE 10 MG/1
10 TABLET, FILM COATED ORAL EVERY 4 HOURS PRN
Qty: 15 TABLET | Refills: 0 | Status: SHIPPED | OUTPATIENT
Start: 2022-09-10 | End: 2022-10-04

## 2022-09-10 RX ORDER — SODIUM CHLORIDE 9 MG/ML
INJECTION, SOLUTION INTRAVENOUS CONTINUOUS
Status: DISCONTINUED | OUTPATIENT
Start: 2022-09-10 | End: 2022-09-10 | Stop reason: HOSPADM

## 2022-09-10 RX ORDER — METHOCARBAMOL 750 MG/1
750 TABLET, FILM COATED ORAL 4 TIMES DAILY
Qty: 40 TABLET | Refills: 0 | Status: SHIPPED | OUTPATIENT
Start: 2022-09-10 | End: 2022-11-05

## 2022-09-10 RX ORDER — DEXAMETHASONE SODIUM PHOSPHATE 4 MG/ML
10 INJECTION, SOLUTION INTRA-ARTICULAR; INTRALESIONAL; INTRAMUSCULAR; INTRAVENOUS; SOFT TISSUE ONCE
Status: COMPLETED | OUTPATIENT
Start: 2022-09-10 | End: 2022-09-10

## 2022-09-10 RX ORDER — METHYLPREDNISOLONE 4 MG/1
TABLET ORAL
Qty: 1 EACH | Refills: 0 | Status: SHIPPED | OUTPATIENT
Start: 2022-09-10 | End: 2022-09-26

## 2022-09-10 RX ORDER — MORPHINE SULFATE 4 MG/ML
4 INJECTION INTRAVENOUS ONCE
Status: COMPLETED | OUTPATIENT
Start: 2022-09-10 | End: 2022-09-10

## 2022-09-10 RX ORDER — KETOROLAC TROMETHAMINE 30 MG/ML
30 INJECTION, SOLUTION INTRAMUSCULAR; INTRAVENOUS ONCE
Status: COMPLETED | OUTPATIENT
Start: 2022-09-10 | End: 2022-09-10

## 2022-09-10 RX ORDER — ONDANSETRON 2 MG/ML
4 INJECTION INTRAMUSCULAR; INTRAVENOUS ONCE
Status: COMPLETED | OUTPATIENT
Start: 2022-09-10 | End: 2022-09-10

## 2022-09-10 RX ADMIN — DEXAMETHASONE SODIUM PHOSPHATE 10 MG: 4 INJECTION, SOLUTION INTRA-ARTICULAR; INTRALESIONAL; INTRAMUSCULAR; INTRAVENOUS; SOFT TISSUE at 06:29

## 2022-09-10 RX ADMIN — SODIUM CHLORIDE: 9 INJECTION, SOLUTION INTRAVENOUS at 04:20

## 2022-09-10 RX ADMIN — KETOROLAC TROMETHAMINE 30 MG: 30 INJECTION, SOLUTION INTRAMUSCULAR; INTRAVENOUS at 06:25

## 2022-09-10 RX ADMIN — MORPHINE SULFATE 4 MG: 4 INJECTION INTRAVENOUS at 04:40

## 2022-09-10 RX ADMIN — ONDANSETRON 4 MG: 2 INJECTION INTRAMUSCULAR; INTRAVENOUS at 04:20

## 2022-09-10 ASSESSMENT — FIBROSIS 4 INDEX: FIB4 SCORE: 0.45

## 2022-09-10 NOTE — ED TRIAGE NOTES
"Chief Complaint   Patient presents with    Leg Pain     Pt reports pain to L leg between knee and ankle radiating to L lower back. Hx of sciatica, Hx of L knee surgery 2006       Pt BIB EMS to triage via w/c for above complaint.     Pt presents in triage with LLE pain. Pt reports pain is mainly sharp pain between knee and ankle, but radiates to lower back. CMS intact. Pt has cellulitis to BLE    Pt back to lobby, educated on triage process and encourage to alert staff of any changes.     Pulse 85   Temp 36.3 °C (97.3 °F) (Temporal)   Resp 20   Ht 1.575 m (5' 2\")   Wt (!) 131 kg (289 lb)   LMP  (LMP Unknown)   SpO2 95%   BMI 52.86 kg/m²      "

## 2022-09-10 NOTE — ED PROVIDER NOTES
ED Provider Note     Scribed for [place] Mora D.O. by Rolan Polanco. 9/10/2022, 2:27 AM.     Primary care provider: Heriberto Sharp M.D.  Means of arrival: EMS         History obtained from: Patient  History limited by: None    CHIEF COMPLAINT  Chief Complaint   Patient presents with    Leg Pain     Pt reports pain to L leg between knee and ankle radiating to L lower back. Hx of sciatica, Hx of L knee surgery 2006       HPI  Lorene Haro is a 47 y.o. female with a history of sciatica who presents to the emergency Department via EMS left leg pain. She describes the pain as being generalized to her calf that radiates to the ankle. She denies falling. Patient denies any history of blood clots but reports a previous knee surgery in 2006. She reports a current cold but denies fever. She mentions she was recently taken off of Warfarin.    REVIEW OF SYSTEMS  Pertinent positives include left leg pain. Pertinent negatives include no fever.   See HPI for further details. All other systems are negative.    PAST MEDICAL HISTORY  Past Medical History:   Diagnosis Date    A-fib (HCC)     Asthma     Bipolar 2 disorder (HCC)     Chickenpox     Hypertension     On home oxygen therapy     Other psychological stress     Schizophrenic disorder (HCC)     Yeast infection of the skin 04/01/2021       FAMILY HISTORY  Family History   Problem Relation Age of Onset    No Known Problems Mother     Cancer Sister        SOCIAL HISTORY  Social History     Tobacco Use    Smoking status: Former    Smokeless tobacco: Never   Vaping Use    Vaping Use: Never used   Substance Use Topics    Alcohol use: Never    Drug use: Never      Social History     Substance and Sexual Activity   Drug Use Never       SURGICAL HISTORY  Past Surgical History:   Procedure Laterality Date    CHOLECYSTECTOMY      COLECTOMY      HYSTERECTOMY LAPAROSCOPY      KNEE ARTHROSCOPY Left        CURRENT MEDICATIONS    Current Facility-Administered Medications:     NS  "infusion, , Intravenous, Continuous, [place] Mora D.O., Last Rate: 125 mL/hr at 09/10/22 0420, New Bag at 09/10/22 0420    ketorolac (TORADOL) injection 30 mg, 30 mg, Intravenous, Once, [place] Mora D.O.    dexamethasone (DECADRON) injection 10 mg, 10 mg, Intravenous, Once, [place] Mora D.O.    Current Outpatient Medications:     montelukast (SINGULAIR) 10 MG Tab, Take 1 Tablet by mouth at bedtime for 30 days., Disp: 30 Tablet, Rfl: 3    traZODone (DESYREL) 50 MG Tab, Take 1 Tablet by mouth at bedtime., Disp: 30 Tablet, Rfl: 3    famotidine (PEPCID) 20 MG Tab, Take 1 Tablet by mouth 2 times a day., Disp: 60 Tablet, Rfl: 0    budesonide-formoterol (SYMBICORT) 160-4.5 MCG/ACT Aerosol, Inhale 2 Puffs 2 times a day for 30 days., Disp: 6 g, Rfl: 3    atorvastatin (LIPITOR) 20 MG Tab, Take 20 mg by mouth at bedtime., Disp: , Rfl:     nitroglycerin (NITROSTAT) 0.4 MG SL Tab, Place 1 tablet under the tongue as needed for Chest Pain., Disp: 25 tablet, Rfl: 0    aripiprazole (ABILIFY) 30 MG tablet, Take 30 mg by mouth every morning., Disp: , Rfl:     ALLERGIES  Allergies   Allergen Reactions    Penicillin G Rash and Itching     pt reports that she gets a rash all over her body and gets itchy    Amoxicillin Rash and Itching     Tolerates cephalosporins, pt reports that she gets a rash all over her body and gets itchy       PHYSICAL EXAM  VITAL SIGNS: BP (!) 147/78   Pulse 79   Temp 36.3 °C (97.3 °F) (Temporal)   Resp 20   Ht 1.575 m (5' 2\")   Wt (!) 131 kg (289 lb)   LMP  (LMP Unknown)   SpO2 98%   BMI 52.86 kg/m²     Constitutional: Moderate distress with any palpation to leg. Morbidly obese. Unkept. Non-toxic appearing.   HENT: Normocephalic,  Nose normal with no mucosal edema or drainage. Oropharynx moist without erythema or exudates.  Eyes: PERRL, EOMI, Conjunctiva without erythema or exudates.   Neck: Supple  Lymphatic: No lymphadenopathy noted.   Cardiovascular: Normal heart rate and Regular rhythm. No " murmur  Thorax & Lungs: Clear and equal breath sounds with good excursion. No respiratory distress, no rhonchi, wheezing or rales.   Abdomen: Abdomen morbidly obese. Bowel sounds normal in all four quadrants. Soft,nontender, .  Skin: Warm, Dry, No erythema, No rashes.   Back: No cervical, thoracic, or lumbosacral tenderness.   Extremities: Bilateral lower extremity chronic venous stasis dermatitis changes. Significant swelling in left calf with tenderness to palpation. Peripheral pulses 4/4 No tenderness  Musculoskeletal: Normal range of motion in all major joints.  Neurologic: Alert & oriented x 3, Normal motor function, Normal sensory function  Psychiatric: Affect odd     DIAGNOSTICS/PROCEDURES    LABS  Results for orders placed or performed during the hospital encounter of 09/10/22   CBC WITH DIFFERENTIAL   Result Value Ref Range    WBC 9.9 4.8 - 10.8 K/uL    RBC 4.12 (L) 4.20 - 5.40 M/uL    Hemoglobin 11.3 (L) 12.0 - 16.0 g/dL    Hematocrit 36.8 (L) 37.0 - 47.0 %    MCV 89.3 81.4 - 97.8 fL    MCH 27.4 27.0 - 33.0 pg    MCHC 30.7 (L) 33.6 - 35.0 g/dL    RDW 57.3 (H) 35.9 - 50.0 fL    Platelet Count 218 164 - 446 K/uL    MPV 11.2 9.0 - 12.9 fL    Neutrophils-Polys 74.90 (H) 44.00 - 72.00 %    Lymphocytes 17.20 (L) 22.00 - 41.00 %    Monocytes 5.90 0.00 - 13.40 %    Eosinophils 1.40 0.00 - 6.90 %    Basophils 0.30 0.00 - 1.80 %    Immature Granulocytes 0.30 0.00 - 0.90 %    Nucleated RBC 0.00 /100 WBC    Neutrophils (Absolute) 7.39 (H) 2.00 - 7.15 K/uL    Lymphs (Absolute) 1.70 1.00 - 4.80 K/uL    Monos (Absolute) 0.58 0.00 - 0.85 K/uL    Eos (Absolute) 0.14 0.00 - 0.51 K/uL    Baso (Absolute) 0.03 0.00 - 0.12 K/uL    Immature Granulocytes (abs) 0.03 0.00 - 0.11 K/uL    NRBC (Absolute) 0.00 K/uL   COMP METABOLIC PANEL   Result Value Ref Range    Sodium 137 135 - 145 mmol/L    Potassium 4.2 3.6 - 5.5 mmol/L    Chloride 104 96 - 112 mmol/L    Co2 25 20 - 33 mmol/L    Anion Gap 8.0 7.0 - 16.0    Glucose 93 65 - 99  mg/dL    Bun 9 8 - 22 mg/dL    Creatinine 0.80 0.50 - 1.40 mg/dL    Calcium 8.8 8.5 - 10.5 mg/dL    AST(SGOT) 8 (L) 12 - 45 U/L    ALT(SGPT) 12 2 - 50 U/L    Alkaline Phosphatase 101 (H) 30 - 99 U/L    Total Bilirubin 0.7 0.1 - 1.5 mg/dL    Albumin 3.6 3.2 - 4.9 g/dL    Total Protein 6.2 6.0 - 8.2 g/dL    Globulin 2.6 1.9 - 3.5 g/dL    A-G Ratio 1.4 g/dL   PROTHROMBIN TIME (INR)   Result Value Ref Range    PT 14.0 12.0 - 14.6 sec    INR 1.09 0.87 - 1.13   APTT   Result Value Ref Range    APTT 29.6 24.7 - 36.0 sec   URINALYSIS (UA)    Specimen: Urine   Result Value Ref Range    Color Yellow     Character Clear     Specific Gravity 1.014 <1.035    Ph 5.5 5.0 - 8.0    Glucose Negative Negative mg/dL    Ketones Negative Negative mg/dL    Protein Negative Negative mg/dL    Bilirubin Negative Negative    Urobilinogen, Urine 0.2 Negative    Nitrite Negative Negative    Leukocyte Esterase Negative Negative    Occult Blood Negative Negative    Micro Urine Req see below    ESTIMATED GFR   Result Value Ref Range    GFR (CKD-EPI) 90 >60 mL/min/1.73 m 2      Labs reviewed by me    RADIOLOGY/PROCEDURES  US-EXTREMITY VENOUS LOWER UNILAT LEFT         No DVT    Results and radiologist interpretation reviewed by me.     COURSE & MEDICAL DECISION MAKING  Pertinent Labs & Imaging studies reviewed. (See chart for details)    2:27 AM - Patient seen and evaluated at bedside. Ordered for US-extremity venous lower unilat left, UA, CDC w/ diff, CMP, INR, APTT to evaluate. Patient will be treated with NS Infusion, morphine 4mg injection, and Zofran 4mg injection for her symptoms. Differential diagnoses include, but are not limited to, Cellulitis, DVT    HYDRATION: Based on the patient's presentation of Abnormal Fluid Loss the patient was given IV fluids. IV Hydration was used because oral hydration was not adequate alone. Upon recheck following hydration, the patient was improved.   Patient's labs were really unremarkable.  Ultrasound of her  lower extremity reveals no DVT.  I treated her with Toradol and Decadron to help with her inflammatory process.  We are awaiting her urinalysis at this time.  Plan will be to discharge her home with a prescription for Toradol.  She is to apply warm moist compresses to her calf make sure she is walking and moving her legs regularly and follow-up with her primary care physician within the week for recheck.  She will be discharged in stable and improved condition.    FINAL IMPRESSION  1. Pain and swelling of left lower leg    2. Morbid obesity (HCC)    3. Sciatica of left side         Rolan HARRIS (Ade), am scribing for, and in the presence of, [place] Mora D.O..    Electronically signed by: Rolan Polanco (Ade), 9/10/2022    [place] HARRIS D.O. personally performed the services described in this documentation, as scribed by Rolan Polanco in my presence, and it is both accurate and complete.    The note accurately reflects work and decisions made by me.  [place] Mora D.O.  9/10/2022  6:23 AM

## 2022-09-10 NOTE — DISCHARGE INSTRUCTIONS
Apply warm moist compresses to your calf, buttocks and low back as needed.  Follow-up with your primary care provider within this week and return if any problems or worsening.  Take the medications I am prescribing you for your leg.

## 2022-09-10 NOTE — ED NOTES
Patient provided discharge instructions. Patient verbalized understanding but patient is resistant to being discharged. Per social work, patient is always difficult when being discharged.  Wristband and IV removed. MTM paperwork provided.

## 2022-09-19 ENCOUNTER — HOSPITAL ENCOUNTER (EMERGENCY)
Facility: MEDICAL CENTER | Age: 47
End: 2022-09-19
Attending: EMERGENCY MEDICINE
Payer: MEDICAID

## 2022-09-19 VITALS
BODY MASS INDEX: 53.37 KG/M2 | DIASTOLIC BLOOD PRESSURE: 71 MMHG | RESPIRATION RATE: 20 BRPM | WEIGHT: 290 LBS | HEIGHT: 62 IN | TEMPERATURE: 98.5 F | OXYGEN SATURATION: 97 % | SYSTOLIC BLOOD PRESSURE: 136 MMHG | HEART RATE: 84 BPM

## 2022-09-19 DIAGNOSIS — B37.2 YEAST DERMATITIS: ICD-10-CM

## 2022-09-19 PROCEDURE — 99284 EMERGENCY DEPT VISIT MOD MDM: CPT

## 2022-09-19 PROCEDURE — A9270 NON-COVERED ITEM OR SERVICE: HCPCS | Performed by: EMERGENCY MEDICINE

## 2022-09-19 PROCEDURE — 700102 HCHG RX REV CODE 250 W/ 637 OVERRIDE(OP): Performed by: EMERGENCY MEDICINE

## 2022-09-19 RX ORDER — NYSTATIN 100000 U/G
1 CREAM TOPICAL 2 TIMES DAILY
Qty: 30 G | Refills: 0 | Status: SHIPPED | OUTPATIENT
Start: 2022-09-19 | End: 2022-10-04

## 2022-09-19 RX ORDER — NYSTATIN 100000 U/G
CREAM TOPICAL 2 TIMES DAILY
Status: COMPLETED | OUTPATIENT
Start: 2022-09-19 | End: 2022-09-19

## 2022-09-19 RX ADMIN — NYSTATIN: 100000 CREAM TOPICAL at 18:30

## 2022-09-19 ASSESSMENT — FIBROSIS 4 INDEX: FIB4 SCORE: 0.52

## 2022-09-19 NOTE — LETTER
"  FORM C-4:  EMPLOYEE’S CLAIM FOR COMPENSATION/ REPORT OF INITIAL TREATMENT  EMPLOYEE’S CLAIM - PROVIDE ALL INFORMATION REQUESTED   First Name Lorene Last Name Tahir Birthdate 1975  Sex female Claim Number   Home Address [address]     [facility]             [place] [address]                                   Age  47 y.o. Height  1.575 m (5' 2\") Weight  (!) 132 kg (290 lb) [facility]  [id number]   Mailing Address [address]    [facility]              Zip [address] Telephone  [phone] (home)  Primary Language Spoken   Insurer  *** Third Party   MEDICAID FFS Employee's Occupation (Job Title) When Injury or Occupational Disease Occurred     Employer's Name  Telephone     Employer Address  City  State  Zip    Date of Injury         Hour of Injury   Date Employer Notified   Last Day of Work after Injury or Occupational Disease   Supervisor to Whom Injury Reported     Address or Location of Accident (if applicable)    What were you doing at the time of accident? (if applicable)     How did this injury or occupational disease occur? Be specific and answer in detail. Use additional sheet if necessary)     If you believe that you have an occupational disease, when did you first have knowledge of the disability and it relationship to your employment?  Witnesses to the Accident     Nature of Injury or Occupational Disease   Part(s) of Body Injured or Affected  , ,     I CERTIFY THAT THE ABOVE IS TRUE AND CORRECT TO THE BEST OF MY KNOWLEDGE AND THAT I HAVE PROVIDED THIS INFORMATION IN ORDER TO OBTAIN THE BENEFITS OF [place]).  I HEREBY AUTHORIZE ANY PHYSICIAN, CHIROPRACTOR, SURGEON, PRACTITIONER, OR OTHER PERSON, ANY HOSPITAL, INCLUDING [facility] OR [facility], ANY MEDICAL SERVICE ORGANIZATION, ANY INSURANCE COMPANY, OR OTHER INSTITUTION OR " ORGANIZATION TO RELEASE TO EACH OTHER, ANY MEDICAL OR OTHER INFORMATION, INCLUDING BENEFITS PAID OR PAYABLE, PERTINENT TO THIS INJURY OR DISEASE, EXCEPT INFORMATION RELATIVE TO DIAGNOSIS, TREATMENT AND/OR COUNSELING FOR AIDS, PSYCHOLOGICAL CONDITIONS, ALCOHOL OR CONTROLLED SUBSTANCES, FOR WHICH I MUST GIVE SPECIFIC AUTHORIZATION.  A PHOTOSTAT OF THIS AUTHORIZATION SHALL BE AS VALID AS THE ORIGINAL.  Date                                      Place                                                                             Employee’s Signature   THIS REPORT MUST BE COMPLETED AND MAILED WITHIN 3 WORKING DAYS OF TREATMENT   Place [facility], EMERGENCY DEPT                       Name of Facility [facility]   Date  9/19/2022 Diagnosis  (B37.2) Yeast dermatitis Is there evidence the injured employee was under the influence of alcohol and/or another controlled substance at the time of accident?   Hour  6:45 PM Description of Injury or Disease  Yeast dermatitis     Treatment     Have you advised the patient to remain off work five days or more?             X-Ray Findings    If Yes   From Date    To Date      From information given by the employee, together with medical evidence, can you directly connect this injury or occupational disease as job incurred?   If No, is employee capable of: Full Duty    Modified Duty      Is additional medical care by a physician indicated?   If Modified Duty, Specify any Limitations / Restrictions       Do you know of any previous injury or disease contributing to this condition or occupational disease?      Date 9/19/2022 Print Doctor’s Name Esequiel Ko I certify the employer’s copy of this form was mailed on:   Address [address] [place] [id number] [phone] INSURER’S USE ONLY   Provider’s Tax ID Number   Telephone Dept: [phone]    Doctor’s Signature   Degree        Form C-4 (rev.10/07)            "                                                              BRIEF DESCRIPTION OF RIGHTS AND BENEFITS  (Pursuant to [place] 616[place])    Notice of Injury or Occupational Disease (Incident Report Form C-1): If an injury or occupational disease (OD) arises out of and in the course of employment, you must provide written notice to your employer as soon as practicable, but no later than 7 days after the accident or OD. Your employer shall maintain a sufficient supply of the required forms.    Claim for Compensation (Form C-4): If medical treatment is sought, the form C-4 is available at the place of initial treatment. A completed \"Claim for Compensation\" (Form C-4) must be filed within 90 days after an accident or OD. The treating physician or chiropractor must, within 3 working days after treatment, complete and mail to the employer, the employer's insurer and third-party , the Claim for Compensation.    Medical Treatment: If you require medical treatment for your on-the-job injury or OD, you may be required to select a physician or chiropractor from a list provided by your workers’ compensation insurer, if it has contracted with an Organization for Managed Care (MCO) or Preferred Provider Organization (PPO) or providers of health care. If your employer has not entered into a contract with an MCO or PPO, you may select a physician or chiropractor from the Panel of Physicians and Chiropractors. Any medical costs related to your industrial injury or OD will be paid by your insurer.    Temporary Total Disability (TTD): If your doctor has certified that you are unable to work for a period of at least 5 consecutive days, or 5 cumulative days in a 20-day period, or places restrictions on you that your employer does not accommodate, you may be entitled to TTD compensation.    Temporary Partial Disability (TPD): If the wage you receive upon reemployment is less than the compensation for TTD to which you are entitled, " the insurer may be required to pay you TPD compensation to make up the difference. TPD can only be paid for a maximum of 24 months.    Permanent Partial Disability (PPD): When your medical condition is stable and there is an indication of a PPD as a result of your injury or OD, within 30 days, your insurer must arrange for an evaluation by a rating physician or chiropractor to determine the degree of your PPD. The amount of your PPD award depends on the date of injury, the results of the PPD evaluation, your age and wage.    Permanent Total Disability (PTD): If you are medically certified by a treating physician or chiropractor as permanently and totally disabled and have been granted a PTD status by your insurer, you are entitled to receive monthly benefits not to exceed 66 2/3% of your average monthly wage. The amount of your PTD payments is subject to reduction if you previously received a lump-sum PPD award.    Vocational Rehabilitation Services: You may be eligible for vocational rehabilitation services if you are unable to return to the job due to a permanent physical impairment or permanent restrictions as a result of your injury or occupational disease.    Transportation and Per Stephanie Reimbursement: You may be eligible for travel expenses and per stephanie associated with medical treatment.    Reopening: You may be able to reopen your claim if your condition worsens after claim closure.     Appeal Process: If you disagree with a written determination issued by the insurer or the insurer does not respond to your request, you may appeal to the Department of Administration, , by following the instructions contained in your determination letter. You must appeal the determination within 70 days from the date of the determination letter at [address], [address], [place], [place] [address], or [address], [address], [place] [address]. If you disagree with the   decision, you may appeal to the Department of Administration, . You must file your appeal within 30 days from the date of the  decision letter at [address], [address], [place], [place] [address], or [address], [place], [place] [address]. If you disagree with a decision of an , you may file a petition for judicial review with the District Court. You must do so within 30 days of the Appeal Officer’s decision. You may be represented by an  at your own expense or you may contact the [facility] for possible representation.    [place]  for Injured Workers (NAIW): If you disagree with a  decision, you may request that NAIW represent you without charge at an  Hearing. For information regarding denial of benefits, you may contact the [facility] at: [address], [address], [place] [address], [phone], or [address], [place] [address], [phone]    To File a Complaint with the Division: If you wish to file a complaint with the  of the Division of Industrial Relations (DIR),  please contact the Workers’ Compensation Section, [address], [address], [place], [place] [address], telephone [phone], or [address], [address], [place], [place] [address], telephone [phone].    For assistance with Workers’ Compensation Issues: You may contact the [facility] Office for Consumer Health Assistance, [address], [address], [place], [place] [address], Toll Free 1-674.442.4960, Web site: http://[facility].[place].gov/Programs/NAZIA E-mail: nazia@[email or web address].gov  D-2 (rev. 10/20)              __________________________________________________________________                                    _________________            Employee Name / Signature                                                                                                                             Date

## 2022-09-20 ENCOUNTER — NON-PROVIDER VISIT (OUTPATIENT)
Dept: CARDIOLOGY | Facility: MEDICAL CENTER | Age: 47
End: 2022-09-20
Payer: MEDICAID

## 2022-09-20 PROCEDURE — 93298 REM INTERROG DEV EVAL SCRMS: CPT | Performed by: INTERNAL MEDICINE

## 2022-09-20 NOTE — ED PROVIDER NOTES
"CHIEF COMPLAINT  Chief Complaint   Patient presents with    Rash     BIB [facility] for above complaint. Redness to bilateral inner thighs, panus, abd x3-4 days with foul smell. Smell possibly due to lack of cleaning herself after BM versus rash.        HPI  Lorene Haro is a 47 y.o. female here for evaluation of yeast infection.  Patient states that she has trouble wiping after urination, and her upper legs will stick together.  She has no fever chills or vomiting, she has no other medical concerns.  She states that this been ongoing, because she is unable to wipe adequately.  She has no dysuria, urgency or frequency.  States that this red, \"yeast\" has been ongoing issue.  She has no chest pain, shortness breath, or headache.  Patient lives alone, but states that she is able to clean the area when she is sitting up.  Patient normally ambulates with a walker.    ROS;  Please see HPI  O/W negative     PAST MEDICAL HISTORY   has a past medical history of A-fib (HCC), Asthma, Bipolar 2 disorder (HCC), Chickenpox, Hypertension, On home oxygen therapy, Other psychological stress, Schizophrenic disorder (HCC), and Yeast infection of the skin (04/01/2021).    SOCIAL HISTORY  Social History     Tobacco Use    Smoking status: Former    Smokeless tobacco: Never   Vaping Use    Vaping Use: Never used   Substance and Sexual Activity    Alcohol use: Never    Drug use: Never    Sexual activity: Not Currently       SURGICAL HISTORY   has a past surgical history that includes hysterectomy laparoscopy; colectomy; cholecystectomy; and knee arthroscopy (Left).    CURRENT MEDICATIONS  Home Medications       Reviewed by Lidia Otto R.N. (Registered Nurse) on 09/19/22 at 1726  Med List Status: Partial     Medication Last Dose Status   aripiprazole (ABILIFY) 30 MG tablet  Active   atorvastatin (LIPITOR) 20 MG Tab  Active   budesonide-formoterol (SYMBICORT) 160-4.5 MCG/ACT Aerosol  Active   famotidine (PEPCID) 20 MG Tab  Active " "  ketorolac (TORADOL) 10 MG Tab  Active   methocarbamol (ROBAXIN) 750 MG Tab  Active   methylPREDNISolone (MEDROL DOSEPAK) 4 MG Tablet Therapy Pack  Active   montelukast (SINGULAIR) 10 MG Tab  Active   nitroglycerin (NITROSTAT) 0.4 MG SL Tab  Active   traZODone (DESYREL) 50 MG Tab  Active                    ALLERGIES  Allergies   Allergen Reactions    Penicillin G Rash and Itching     pt reports that she gets a rash all over her body and gets itchy    Amoxicillin Rash and Itching     Tolerates cephalosporins, pt reports that she gets a rash all over her body and gets itchy       REVIEW OF SYSTEMS  See HPI for further details. Review of systems as above, otherwise all other systems are negative.     PHYSICAL EXAM  VITAL SIGNS: /74   Pulse 94   Temp 36.9 °C (98.4 °F)   Resp 20   Ht 1.575 m (5' 2\")   Wt (!) 132 kg (290 lb)   LMP  (LMP Unknown)   SpO2 95%   BMI 53.04 kg/m²     Constitutional: Well developed, well nourished. No acute distress.  HEENT: Normocephalic, atraumatic. MMM  Neck: Supple, Full range of motion   Chest/Pulmonary:  No respiratory distress.  Equal expansion   Musculoskeletal: No deformity, no edema, neurovascular intact.   Neuro: Clear speech, appropriate, cooperative, cranial nerves II-XII grossly intact.  Psych: Normal mood and affect  Skin; bilateral inner thighs with yeast dermatitis.  No abscess, mild tenderness to palpation.      PROCEDURES     MEDICAL RECORD  I have reviewed patient's medical record and pertinent results are listed.    COURSE & MEDICAL DECISION MAKING  I have reviewed any medical record information, laboratory studies and radiographic results as noted above.    Patient will be given nystatin cream here, in addition to 1 being sent to the pharmacy.  She states that she is able to clean and dry herself, but sometimes does not do because she is \"already laying down.\"  She has no fever chills or vomiting, no other medical concerns at this time.    I you have had any " blood pressure issues while here in the emergency department, please see your doctor for a further evaluation or work up.    Differential diagnoses include but not limited to: Yeast dermatitis    This patient presents with yeast dermatitis.  At this time, I have counseled the patient/family regarding their medications, pain control, and follow up.  They will continue their medications, if any, as prescribed.  They will return immediately for any worsening symptoms and/or any other medical concerns.  They will see their doctor, or contact the doctor provided, in 1-2 days for follow up.       FINAL IMPRESSION  1. Yeast dermatitis  nystatin (MYCOSTATIN) 144811 UNIT/GM Cream topical cream              Electronically signed by: Esequiel Ko D.O., 9/19/2022 5:37 PM    ED Provider Note

## 2022-09-20 NOTE — ED TRIAGE NOTES
Chief Complaint   Patient presents with    Rash     BIB [facility] for above complaint. Redness to bilateral inner thighs, panus, abd x3-4 days with foul smell. Smell possibly due to lack of cleaning herself after BM versus rash.

## 2022-09-20 NOTE — ED NOTES
Pt cleaned and nystatin applied. I wheeled her to the phones in the lobby and provided her with her insurance information so she can call [facility].

## 2022-09-21 NOTE — PROGRESS NOTES
CHW called the patient to follow up.   Pt states that she will not make it to her cardiology appointment on Friday as it is too early in the morning.   CHW called the office and rescheduled this appt to 11/1/2022. CHW will call the office after 10/1/2022 to schedule [employer] transportation to the appt.     Pt reminded off her PCP appointment on 11/14/2022.     CHW will follow up after October 1st to verify that [employer] transportation has been scheduled.

## 2022-09-21 NOTE — CARDIAC REMOTE MONITOR - SCAN
Device transmission reviewed. Device demonstrated appropriate function.       Electronically Signed by: Nader Barraza M.D.    9/23/2022  6:42 PM

## 2022-09-24 ENCOUNTER — HOSPITAL ENCOUNTER (EMERGENCY)
Facility: MEDICAL CENTER | Age: 47
End: 2022-09-24
Attending: EMERGENCY MEDICINE
Payer: MEDICAID

## 2022-09-24 ENCOUNTER — APPOINTMENT (OUTPATIENT)
Dept: RADIOLOGY | Facility: MEDICAL CENTER | Age: 47
End: 2022-09-24
Attending: EMERGENCY MEDICINE
Payer: MEDICAID

## 2022-09-24 VITALS
RESPIRATION RATE: 18 BRPM | DIASTOLIC BLOOD PRESSURE: 68 MMHG | BODY MASS INDEX: 53.37 KG/M2 | HEART RATE: 85 BPM | WEIGHT: 290 LBS | OXYGEN SATURATION: 97 % | HEIGHT: 62 IN | SYSTOLIC BLOOD PRESSURE: 104 MMHG | TEMPERATURE: 98.2 F

## 2022-09-24 DIAGNOSIS — R29.898 LEFT LEG WEAKNESS: ICD-10-CM

## 2022-09-24 LAB
ALBUMIN SERPL BCP-MCNC: 3.5 G/DL (ref 3.2–4.9)
ALBUMIN/GLOB SERPL: 1.3 G/DL
ALP SERPL-CCNC: 99 U/L (ref 30–99)
ALT SERPL-CCNC: 13 U/L (ref 2–50)
ANION GAP SERPL CALC-SCNC: 9 MMOL/L (ref 7–16)
AST SERPL-CCNC: 9 U/L (ref 12–45)
BASOPHILS # BLD AUTO: 0.2 % (ref 0–1.8)
BASOPHILS # BLD: 0.02 K/UL (ref 0–0.12)
BILIRUB SERPL-MCNC: 0.3 MG/DL (ref 0.1–1.5)
BUN SERPL-MCNC: 11 MG/DL (ref 8–22)
CALCIUM SERPL-MCNC: 9.3 MG/DL (ref 8.5–10.5)
CHLORIDE SERPL-SCNC: 109 MMOL/L (ref 96–112)
CO2 SERPL-SCNC: 26 MMOL/L (ref 20–33)
CREAT SERPL-MCNC: 0.87 MG/DL (ref 0.5–1.4)
EOSINOPHIL # BLD AUTO: 0.08 K/UL (ref 0–0.51)
EOSINOPHIL NFR BLD: 0.8 % (ref 0–6.9)
ERYTHROCYTE [DISTWIDTH] IN BLOOD BY AUTOMATED COUNT: 54.6 FL (ref 35.9–50)
GFR SERPLBLD CREATININE-BSD FMLA CKD-EPI: 82 ML/MIN/1.73 M 2
GLOBULIN SER CALC-MCNC: 2.7 G/DL (ref 1.9–3.5)
GLUCOSE SERPL-MCNC: 96 MG/DL (ref 65–99)
HCG SERPL QL: NEGATIVE
HCT VFR BLD AUTO: 37.2 % (ref 37–47)
HGB BLD-MCNC: 11.3 G/DL (ref 12–16)
IMM GRANULOCYTES # BLD AUTO: 0.06 K/UL (ref 0–0.11)
IMM GRANULOCYTES NFR BLD AUTO: 0.6 % (ref 0–0.9)
LYMPHOCYTES # BLD AUTO: 1.49 K/UL (ref 1–4.8)
LYMPHOCYTES NFR BLD: 14.1 % (ref 22–41)
MCH RBC QN AUTO: 26.9 PG (ref 27–33)
MCHC RBC AUTO-ENTMCNC: 30.4 G/DL (ref 33.6–35)
MCV RBC AUTO: 88.6 FL (ref 81.4–97.8)
MONOCYTES # BLD AUTO: 0.69 K/UL (ref 0–0.85)
MONOCYTES NFR BLD AUTO: 6.5 % (ref 0–13.4)
NEUTROPHILS # BLD AUTO: 8.2 K/UL (ref 2–7.15)
NEUTROPHILS NFR BLD: 77.8 % (ref 44–72)
NRBC # BLD AUTO: 0 K/UL
NRBC BLD-RTO: 0 /100 WBC
PLATELET # BLD AUTO: 272 K/UL (ref 164–446)
PMV BLD AUTO: 11.1 FL (ref 9–12.9)
POTASSIUM SERPL-SCNC: 4 MMOL/L (ref 3.6–5.5)
PROT SERPL-MCNC: 6.2 G/DL (ref 6–8.2)
RBC # BLD AUTO: 4.2 M/UL (ref 4.2–5.4)
SODIUM SERPL-SCNC: 144 MMOL/L (ref 135–145)
WBC # BLD AUTO: 10.5 K/UL (ref 4.8–10.8)

## 2022-09-24 PROCEDURE — 84703 CHORIONIC GONADOTROPIN ASSAY: CPT

## 2022-09-24 PROCEDURE — 80053 COMPREHEN METABOLIC PANEL: CPT

## 2022-09-24 PROCEDURE — 72131 CT LUMBAR SPINE W/O DYE: CPT

## 2022-09-24 PROCEDURE — 99284 EMERGENCY DEPT VISIT MOD MDM: CPT | Mod: 25

## 2022-09-24 PROCEDURE — 36415 COLL VENOUS BLD VENIPUNCTURE: CPT

## 2022-09-24 PROCEDURE — 85025 COMPLETE CBC W/AUTO DIFF WBC: CPT

## 2022-09-24 ASSESSMENT — FIBROSIS 4 INDEX: FIB4 SCORE: 0.52

## 2022-09-25 NOTE — ED NOTES
Discharge education provided. Discharge paperwork signed by pt. All questions answered. All belongings with pt. Pt wheeled to the lobby and provided w/ her medicaid # to call [facility].

## 2022-09-25 NOTE — ED NOTES
"Pt wheeled to [facility]. Pt minimally ambulatory for short distances; pt had a hard time getting from wheelchair to bed. Increased work of breathing was noted on transfer; pt placed on 2lpm O2 w/ pt stating, \"I wear oxygen at night.\" Pt resting in bed at this time w/ equal chest rise/fall w/ continued SPO2 monitoring. Chart up for ERP.   "

## 2022-09-25 NOTE — DISCHARGE INSTRUCTIONS
Please follow-up with your primary care physician, call Monday morning to let them know you are seen in the emergency department and schedule a close follow-up appointment.  Please see if they can book you an earlier appointment than you previously scheduled.  Return to the emergency department if you develop any new or worsening symptoms including worsening pain, weakness, numbness or tingling, incontinence, or if you have any further concerns.

## 2022-09-25 NOTE — ED PROVIDER NOTES
ED Physician Note    Chief Concern:   Left leg weakness    HPI:  Lorene Haro is a very pleasant 47-year-old woman who presents to the emergency department for evaluation of left leg weakness.  Symptoms have been present for the past 2 to 3 weeks, she states that she is having to scoot her left leg over when she transfers from her walker to the bed.  She also reports some decrease sensation in the left lower extremity, that has been persistent for the past 2 to 3 weeks as well.  She has a history of sleep apnea, she also has a history of chronic skin changes to bilateral lower extremities, as well as some scaling, suggestive of venous stasis.  She has a history of chronic edema as well.  She reports some pain localized to bilateral shins as well as significant edema.  She reports no recent injuries, states that she does not wear compression stockings for edema because she has some trouble putting them on.  She does use a walker for ambulation, she is also oxygen dependent at baseline, and utilizes oxygen during the day.  She states she does not use oxygen at night, nor CPAP, though she is noted to have a history of obstructive sleep apnea, and prior records no noted.  She does not have any fecal no urinary incontinence, though states that when she does use the restroom she has a difficult time cleaning herself, and has actually been seen in this emergency department for related symptoms previously.  She reports no associated back pain.  She states she does have a history of left-sided sciatica that has caused similar symptoms previously.    Review of Systems:  See HPI for pertinent positives and negatives. All other systems negative.    Past Medical History:   has a past medical history of A-fib ([facility]), Asthma, Bipolar 2 disorder ([facility]), Chickenpox, Hypertension, On home oxygen therapy, Other psychological stress, Schizophrenic disorder ([facility]), and Yeast infection of the skin (04/01/2021).    Social  "History:  Social History     Tobacco Use    Smoking status: Every Day     Packs/day: 0.25     Types: Cigarettes    Smokeless tobacco: Never   Vaping Use    Vaping Use: Never used   Substance and Sexual Activity    Alcohol use: Never    Drug use: Never    Sexual activity: Not Currently       Surgical History:   has a past surgical history that includes hysterectomy laparoscopy; colectomy; cholecystectomy; and knee arthroscopy (Left).    Current Medications:  Home Medications       Reviewed by Michell Robles R.N. (Registered Nurse) on 09/24/22 at 2015  Med List Status: Not Addressed     Medication Last Dose Status   aripiprazole (ABILIFY) 30 MG tablet  Active   atorvastatin (LIPITOR) 20 MG Tab  Active   budesonide-formoterol (SYMBICORT) 160-4.5 MCG/ACT Aerosol  Active   famotidine (PEPCID) 20 MG Tab  Active   ketorolac (TORADOL) 10 MG Tab  Active   methocarbamol (ROBAXIN) 750 MG Tab  Active   methylPREDNISolone (MEDROL DOSEPAK) 4 MG Tablet Therapy Pack  Active   montelukast (SINGULAIR) 10 MG Tab  Active   nitroglycerin (NITROSTAT) 0.4 MG SL Tab  Active   nystatin (MYCOSTATIN) 559349 UNIT/GM Cream topical cream  Active   traZODone (DESYREL) 50 MG Tab  Active                    Allergies:  Allergies   Allergen Reactions    Penicillin G Rash and Itching     pt reports that she gets a rash all over her body and gets itchy    Amoxicillin Rash and Itching     Tolerates cephalosporins, pt reports that she gets a rash all over her body and gets itchy       Physical Exam:  Vital Signs: BP (!) 164/84   Pulse 85   Temp 36.6 °C (97.8 °F) (Temporal)   Resp 16   Ht 1.575 m (5' 2\")   Wt (!) 132 kg (290 lb)   LMP  (LMP Unknown)   SpO2 98%   BMI 53.04 kg/m²   Constitutional: Alert, no acute distress  HENT: Normocephalic, mask in place  Eyes: Pupils equal and reactive, normal conjunctiva  Neck: Supple, normal range of motion, no stridor  Cardiovascular: Extremities are warm and well perfused, no murmur appreciated, " normal cardiac auscultation  Pulmonary: No respiratory distress, normal work of breathing, no accessory muscule usage, breath sounds clear and equal bilaterally, oxygen saturation 98% on home supplemental oxygen  Abdomen: Soft, non-distended, non-tender to palpation, no peritoneal signs  Skin: Warm, dry, chronic changes noted bilaterally to the lower extremities with associated edema, she does have some areas of blanching redness to the shins, this is mildly tender to palpation, no significant tenderness to palpation to suggest cellulitis.  Musculoskeletal: She is able to flex t the left hip, and partially bend the left knee though she states she has some limited range of motion of the knee due to prior surgical procedures, this is chronic and unchanged at baseline.  She is also able to flex and extend the right hip.  I do not appreciate any significant change in movement or motor strength between the left and the right.  She is not able to ambulate any significant distance, which is chronic and at baseline.  Neurologic: Sensory function intact throughout bilateral lower extremities, though she does report somewhat decreased sensation to the left lower extremity.  There is no full sensory deficit.  Psychiatric: Normal and appropriate mood and affect    Medical records reviewed for continuity of care.  Ms. Haro was most recently seen in this emergency department 9/19/2022, 5 days ago.  Emergency physician note reviewed from that visit.  She presented for evaluation of a yeast infection, states that she has trouble wiping after urination or leg stick together.  Physical exam was consistent with candidiasis, she was treated with nystatin cream.  She was discharged home in stable condition.  She was also seen in the emergency department 9/10/2022 for evaluation of left leg pain.  Lab work was reassuring, left lower extremity ultrasound showed no evidence of DVT.  She was treated with Toradol and Decadron, and  discharged home.      Labs:  Labs Reviewed   CBC WITH DIFFERENTIAL - Abnormal; Notable for the following components:       Result Value    Hemoglobin 11.3 (*)     MCH 26.9 (*)     MCHC 30.4 (*)     RDW 54.6 (*)     Neutrophils-Polys 77.80 (*)     Lymphocytes 14.10 (*)     Neutrophils (Absolute) 8.20 (*)     All other components within normal limits   COMP METABOLIC PANEL - Abnormal; Notable for the following components:    AST(SGOT) 9 (*)     All other components within normal limits   HCG QUAL SERUM   ESTIMATED GFR       Radiology:  CT-LSPINE W/O PLUS RECONS   Final Result         1. No acute fracture or malalignment appreciated in the lumbar spine              ED Medications Administered:  Medications - No data to display    Differential diagnosis:  Sciatica, radiculopathy, deconditioning, less likely DVT given recent negative ultrasound, less likely spinal cord injury given absence of back pain, no symptoms of cauda equina, and no appreciable neurologic deficits on physical exam    MDM:  Ms. Haro presents to the emergency department today for evaluation of left lower extremity weakness.  Symptoms have been persistent for the past 2 to 3 weeks, with no worsening symptoms.  She did have a evaluation earlier this month, and had a negative lower extremity ultrasound.  She does not have any localized calf tenderness to palpation, no unilateral edema, and no worsening edema, doubt DVT.  She does not have any red flag symptoms concerning for cauda equina.  She is afebrile with no tachycardia, no hypotension, doubt infectious etiology.    On laboratory evaluation her CMP is entirely within normal limits.  She has a normal white blood count, no significant neutrophil elevation on differential, no bands resulted at this time, again less concerning for infectious etiology.  hCG is negative.    I did obtain a CT scan of the lumbar spine, this demonstrated no acute fracture or malalignment.  Mild degenerative changes noted,  no compression fracture identified, no severe chronic process.    At this time, etiology of her left lower extremity weakness is unclear.  She states that she is able to ambulate and bear weight, is able to care for self.  She is not currently in any type of physical therapy program.  She is counseled to call her primary care physician Monday morning to schedule close follow-up appointment to discuss her ongoing symptoms.  If her symptoms progress, or do not improve she may require MRI, or further diagnostics.  She is counseled to discuss this with her primary care physician. Return precautions were discussed with the patient, and provided in written form with the patient's discharge instructions.     Disposition:  Discharged home in stable condition    Final Impression:  1. Left leg weakness        Electronically signed by: Mikaela Lemons MD, 9/24/2022 11:22 PM

## 2022-09-25 NOTE — ED TRIAGE NOTES
Lorene Haro  47 y.o.  Chief Complaint   Patient presents with    Leg Pain     BIBA for above, pt has hx of sciatica nerve issues. Today pt experiencing L leg numbness from knee to toes, CMS intact, -stroke scale. Pt normally ambulatory with 4WW. A&Ox4, placed back in lobby.

## 2022-09-26 ENCOUNTER — HOSPITAL ENCOUNTER (EMERGENCY)
Facility: MEDICAL CENTER | Age: 47
End: 2022-09-26
Attending: EMERGENCY MEDICINE
Payer: MEDICAID

## 2022-09-26 VITALS
TEMPERATURE: 97.1 F | BODY MASS INDEX: 53.92 KG/M2 | WEIGHT: 293 LBS | HEIGHT: 62 IN | HEART RATE: 74 BPM | SYSTOLIC BLOOD PRESSURE: 150 MMHG | RESPIRATION RATE: 18 BRPM | OXYGEN SATURATION: 96 % | DIASTOLIC BLOOD PRESSURE: 72 MMHG

## 2022-09-26 DIAGNOSIS — M79.605 PAIN OF LEFT LOWER EXTREMITY: ICD-10-CM

## 2022-09-26 DIAGNOSIS — M54.32 SCIATICA OF LEFT SIDE: ICD-10-CM

## 2022-09-26 PROCEDURE — 700102 HCHG RX REV CODE 250 W/ 637 OVERRIDE(OP): Performed by: EMERGENCY MEDICINE

## 2022-09-26 PROCEDURE — A9270 NON-COVERED ITEM OR SERVICE: HCPCS | Performed by: EMERGENCY MEDICINE

## 2022-09-26 PROCEDURE — 99284 EMERGENCY DEPT VISIT MOD MDM: CPT

## 2022-09-26 RX ORDER — OXYCODONE HYDROCHLORIDE AND ACETAMINOPHEN 5; 325 MG/1; MG/1
1 TABLET ORAL ONCE
Status: COMPLETED | OUTPATIENT
Start: 2022-09-26 | End: 2022-09-26

## 2022-09-26 RX ORDER — METHYLPREDNISOLONE 4 MG/1
TABLET ORAL
Qty: 21 TABLET | Refills: 0 | Status: ON HOLD | OUTPATIENT
Start: 2022-09-26 | End: 2022-10-02

## 2022-09-26 RX ORDER — OXYCODONE HYDROCHLORIDE AND ACETAMINOPHEN 5; 325 MG/1; MG/1
1 TABLET ORAL EVERY 4 HOURS PRN
Qty: 12 TABLET | Refills: 0 | Status: ON HOLD | OUTPATIENT
Start: 2022-09-26 | End: 2022-10-02

## 2022-09-26 RX ADMIN — OXYCODONE AND ACETAMINOPHEN 1 TABLET: 5; 325 TABLET ORAL at 11:24

## 2022-09-26 ASSESSMENT — FIBROSIS 4 INDEX: FIB4 SCORE: 0.45

## 2022-09-26 NOTE — ED PROVIDER NOTES
"ED Provider Note    Scribed for Bentley Archer M.D. by Chaparro Olivarez. 9/26/2022  11:07 AM    Primary care provider: Heriberto Sharp M.D.  Means of arrival: EMS  History obtained from: Patient  History limited by: None    CHIEF COMPLAINT  Chief Complaint   Patient presents with    Leg Pain     Left x1year, was seen here 2days ago and was sent by her doctor today   Symptom x1year       HPI  Lorene Haro is a 47 y.o. female who presents to the Emergency Department via EMS for evaluation of chronic left leg pain onset 1 year ago. The patient states that she was evaluated in the ED 2 days ago for identical complaints, but has received a considerable work up in the past including labs and imaging. Today she reports that while attempting to walk earlier today, she suddenly felt her left leg \"give out\" and after contacting her PCP was advised to visit the ED for further evaluation. She localizes the pain to her posterior left leg and reports that it shoots up to her mid left calf. Associated symptoms include reduced ambulatory capacity and left lower extremity weakness. The patient denies any recent trauma, loss of bowel control, or loss of bladder control. No alleviating or exacerbating factors noted. She reports having a prescription for muscle relaxer's, but only medicates with them very sparingly.     REVIEW OF SYSTEMS  Pertinent positives include chronic left leg pain, reduced ambulatory capacity, and left lower extremity weakness.   Pertinent negatives include no recent trauma, loss of bowel control or loss of bladder control.    See HPI for further details.     PAST MEDICAL HISTORY   has a past medical history of A-fib ([facility]), Asthma, Bipolar 2 disorder ([facility]), Chickenpox, Hypertension, On home oxygen therapy, Other psychological stress, Schizophrenic disorder ([facility]), and Yeast infection of the skin (04/01/2021).    SURGICAL HISTORY   has a past surgical history that includes hysterectomy laparoscopy; " "colectomy; cholecystectomy; and knee arthroscopy (Left).    SOCIAL HISTORY  Social History     Tobacco Use    Smoking status: Every Day     Packs/day: 0.25     Types: Cigarettes    Smokeless tobacco: Never   Vaping Use    Vaping Use: Never used   Substance Use Topics    Alcohol use: Never    Drug use: Never      Social History     Substance and Sexual Activity   Drug Use Never       FAMILY HISTORY  Family History   Problem Relation Age of Onset    No Known Problems Mother     Cancer Sister        CURRENT MEDICATIONS  Home Medications    **Home medications have not yet been reviewed for this encounter**         ALLERGIES  Allergies   Allergen Reactions    Penicillin G Rash and Itching     pt reports that she gets a rash all over her body and gets itchy    Amoxicillin Rash and Itching     Tolerates cephalosporins, pt reports that she gets a rash all over her body and gets itchy       PHYSICAL EXAM  VITAL SIGNS: /66   Pulse 75   Temp 35.9 °C (96.6 °F) (Temporal)   Resp 16   Ht 1.575 m (5' 2\")   Wt (!) 135 kg (297 lb 9.9 oz)   LMP  (LMP Unknown)   SpO2 96%   BMI 54.44 kg/m²     Constitutional: Well-developed and well-nourished. No distress. Chronically ill appearing.   HENT: Head is normocephalic and atraumatic.   Cardiovascular: Capillary refill is less than 2 seconds  Pulmonary/Chest: No respiratory distress.    Abdominal: Atraumatic.  Musculoskeletal: Extremities exhibit normal range of motion. 1+ Bilateral lower extremity edema.   Neurological: Awake, alert and oriented to person, place, and time. Left lower extremity is neurologically intact. No saddle anesthesia.  Normal strength and sensation bilateral lower extremities.  Skin: Skin is warm and dry. No rash.   Psychiatric: Normal mood and affect. Appropriate for clinical situation    COURSE & MEDICAL DECISION MAKING  Nursing notes, VS, PMSFHx reviewed in chart.     11:07 AM - Patient seen and examined at bedside. Patient will be treated with " Percocet 5-325 mg 1 tablet. The patient has had an extensive work up for identical complaints in the past. Her history and physical is most consistent with sciatica. Will treat the patient with Medrol and include a referral to pain management. Discussed plan for discharge; I advised the patient to follow-up with Dr. Sharp and Dr. Montes De Oca as soon as possible, and to return to [facility] ED with any new or worsening symptoms. Patient was given the opportunity for questions. I addressed all questions or concerns at this time and they verbalize agreement to the plan of care.       I reviewed prescription monitoring program for patient's narcotic use before prescribing a scheduled drug.The patient will not drink alcohol nor drive with prescribed medications. The patient will return for new or worsening symptoms and is stable at the time of discharge.    The patient is referred to a primary physician for blood pressure management, diabetic screening, and for all other preventative health concerns.    In prescribing controlled substances to this patient, I certify that I have obtained and reviewed the medical history of Lorene Haro. I have also made a good hansa effort to obtain applicable records from other providers who have treated the patient and records did not demonstrate any increased risk of substance abuse that would prevent me from prescribing controlled substances.     I have conducted a physical exam and documented it. I have reviewed Ms. Haro’s prescription history as maintained by the [place] Prescription Monitoring Program.     I have assessed the patient’s risk for abuse, dependency, and addiction using the validated Opioid Risk Tool available at https://www.mdcalc.com/nvoogj-xpjb-usuo-ort-narcotic-abuse.     Given the above, I believe the benefits of controlled substance therapy outweigh the risks. The reasons for prescribing controlled substances include non-narcotic, oral analgesic alternatives  have been inadequate for pain control. Accordingly, I have discussed the risk and benefits, treatment plan, and alternative therapies with the patient.       DISPOSITION:  Patient will be discharged home in stable condition.    FOLLOW UP:  Heriberto Sharp M.D.  [address] [id number] [phone]    Schedule an appointment as soon as possible for a visit       [facility], Emergency Dept  [address] [address] [phone]    If symptoms worsen    Yosef Montes De Oca M.D.  [address] [id number] [phone]    Today  [facility]ited [facility] urgent care      OUTPATIENT MEDICATIONS:  New Prescriptions    METHYLPREDNISOLONE (MEDROL) 4 MG TAB    Take as per the package instructions.    OXYCODONE-ACETAMINOPHEN (PERCOCET) 5-325 MG TAB    Take 1 Tablet by mouth every four hours as needed for Moderate Pain (pain) for up to 5 days.         FINAL IMPRESSION  1. Pain of left lower extremity    2. Sciatica of left side          IChaparro (Kevon[employer]raul), am scribing for, and in the presence of, Bentley Archer M.D..    Electronically signed by: Chaparro Olivarez (Scribe), 9/26/2022    IBentley M.D. personally performed the services described in this documentation, as scribed by Chaparro Olivarez in my presence, and it is both accurate and complete.    The note accurately reflects work and decisions made by me.  Bentley Archer M.D.  9/26/2022  2:48 PM

## 2022-09-26 NOTE — ED TRIAGE NOTES
Chief Complaint   Patient presents with    Leg Pain     Left x1year, was seen here 2days ago and was sent by her doctor today   Symptom x1year       Pt bib ems with above complaint. Pt said that she run out of her Lasix 3months ago and leg has been swollen. Denies pain right leg , no injury .

## 2022-09-28 ENCOUNTER — APPOINTMENT (OUTPATIENT)
Dept: RADIOLOGY | Facility: MEDICAL CENTER | Age: 47
End: 2022-09-28
Attending: EMERGENCY MEDICINE
Payer: MEDICAID

## 2022-09-28 ENCOUNTER — HOSPITAL ENCOUNTER (EMERGENCY)
Facility: MEDICAL CENTER | Age: 47
End: 2022-09-28
Attending: EMERGENCY MEDICINE
Payer: MEDICAID

## 2022-09-28 VITALS
HEIGHT: 61 IN | RESPIRATION RATE: 15 BRPM | HEART RATE: 79 BPM | OXYGEN SATURATION: 97 % | TEMPERATURE: 97.1 F | BODY MASS INDEX: 55.32 KG/M2 | DIASTOLIC BLOOD PRESSURE: 56 MMHG | SYSTOLIC BLOOD PRESSURE: 119 MMHG | WEIGHT: 293 LBS

## 2022-09-28 DIAGNOSIS — T88.7XXA MEDICATION SIDE EFFECT: ICD-10-CM

## 2022-09-28 DIAGNOSIS — R06.00 DYSPNEA, UNSPECIFIED TYPE: ICD-10-CM

## 2022-09-28 LAB
ALBUMIN SERPL BCP-MCNC: 3.6 G/DL (ref 3.2–4.9)
ALBUMIN/GLOB SERPL: 1.2 G/DL
ALP SERPL-CCNC: 104 U/L (ref 30–99)
ALT SERPL-CCNC: 14 U/L (ref 2–50)
ANION GAP SERPL CALC-SCNC: 9 MMOL/L (ref 7–16)
AST SERPL-CCNC: 11 U/L (ref 12–45)
BASOPHILS # BLD AUTO: 0.2 % (ref 0–1.8)
BASOPHILS # BLD: 0.02 K/UL (ref 0–0.12)
BILIRUB SERPL-MCNC: 0.6 MG/DL (ref 0.1–1.5)
BUN SERPL-MCNC: 9 MG/DL (ref 8–22)
CALCIUM SERPL-MCNC: 8.9 MG/DL (ref 8.5–10.5)
CHLORIDE SERPL-SCNC: 107 MMOL/L (ref 96–112)
CO2 SERPL-SCNC: 26 MMOL/L (ref 20–33)
CREAT SERPL-MCNC: 0.88 MG/DL (ref 0.5–1.4)
D DIMER PPP IA.FEU-MCNC: 0.46 UG/ML (FEU) (ref 0–0.5)
EKG IMPRESSION: NORMAL
EOSINOPHIL # BLD AUTO: 0.09 K/UL (ref 0–0.51)
EOSINOPHIL NFR BLD: 1.1 % (ref 0–6.9)
ERYTHROCYTE [DISTWIDTH] IN BLOOD BY AUTOMATED COUNT: 52.9 FL (ref 35.9–50)
GFR SERPLBLD CREATININE-BSD FMLA CKD-EPI: 80 ML/MIN/1.73 M 2
GLOBULIN SER CALC-MCNC: 3 G/DL (ref 1.9–3.5)
GLUCOSE SERPL-MCNC: 114 MG/DL (ref 65–99)
HCT VFR BLD AUTO: 36.6 % (ref 37–47)
HGB BLD-MCNC: 11.2 G/DL (ref 12–16)
IMM GRANULOCYTES # BLD AUTO: 0.03 K/UL (ref 0–0.11)
IMM GRANULOCYTES NFR BLD AUTO: 0.4 % (ref 0–0.9)
LACTATE SERPL-SCNC: 1.4 MMOL/L (ref 0.5–2)
LYMPHOCYTES # BLD AUTO: 1.19 K/UL (ref 1–4.8)
LYMPHOCYTES NFR BLD: 14.5 % (ref 22–41)
MCH RBC QN AUTO: 26.5 PG (ref 27–33)
MCHC RBC AUTO-ENTMCNC: 30.6 G/DL (ref 33.6–35)
MCV RBC AUTO: 86.7 FL (ref 81.4–97.8)
MONOCYTES # BLD AUTO: 0.5 K/UL (ref 0–0.85)
MONOCYTES NFR BLD AUTO: 6.1 % (ref 0–13.4)
NEUTROPHILS # BLD AUTO: 6.37 K/UL (ref 2–7.15)
NEUTROPHILS NFR BLD: 77.7 % (ref 44–72)
NRBC # BLD AUTO: 0 K/UL
NRBC BLD-RTO: 0 /100 WBC
NT-PROBNP SERPL IA-MCNC: 168 PG/ML (ref 0–125)
PLATELET # BLD AUTO: 217 K/UL (ref 164–446)
PMV BLD AUTO: 10.4 FL (ref 9–12.9)
POTASSIUM SERPL-SCNC: 3.7 MMOL/L (ref 3.6–5.5)
PROT SERPL-MCNC: 6.6 G/DL (ref 6–8.2)
RBC # BLD AUTO: 4.22 M/UL (ref 4.2–5.4)
SODIUM SERPL-SCNC: 142 MMOL/L (ref 135–145)
TROPONIN T SERPL-MCNC: 28 NG/L (ref 6–19)
WBC # BLD AUTO: 8.2 K/UL (ref 4.8–10.8)

## 2022-09-28 PROCEDURE — 85025 COMPLETE CBC W/AUTO DIFF WBC: CPT

## 2022-09-28 PROCEDURE — 93005 ELECTROCARDIOGRAM TRACING: CPT | Performed by: EMERGENCY MEDICINE

## 2022-09-28 PROCEDURE — 71045 X-RAY EXAM CHEST 1 VIEW: CPT

## 2022-09-28 PROCEDURE — 99284 EMERGENCY DEPT VISIT MOD MDM: CPT

## 2022-09-28 PROCEDURE — 36415 COLL VENOUS BLD VENIPUNCTURE: CPT

## 2022-09-28 PROCEDURE — 700101 HCHG RX REV CODE 250: Performed by: EMERGENCY MEDICINE

## 2022-09-28 PROCEDURE — 87040 BLOOD CULTURE FOR BACTERIA: CPT

## 2022-09-28 PROCEDURE — 83605 ASSAY OF LACTIC ACID: CPT

## 2022-09-28 PROCEDURE — 84484 ASSAY OF TROPONIN QUANT: CPT

## 2022-09-28 PROCEDURE — 83880 ASSAY OF NATRIURETIC PEPTIDE: CPT

## 2022-09-28 PROCEDURE — 85379 FIBRIN DEGRADATION QUANT: CPT

## 2022-09-28 PROCEDURE — 80053 COMPREHEN METABOLIC PANEL: CPT

## 2022-09-28 RX ORDER — IPRATROPIUM BROMIDE AND ALBUTEROL SULFATE 2.5; .5 MG/3ML; MG/3ML
3 SOLUTION RESPIRATORY (INHALATION) ONCE
Status: COMPLETED | OUTPATIENT
Start: 2022-09-28 | End: 2022-09-28

## 2022-09-28 RX ADMIN — IPRATROPIUM BROMIDE AND ALBUTEROL SULFATE 3 ML: .5; 2.5 SOLUTION RESPIRATORY (INHALATION) at 14:41

## 2022-09-28 ASSESSMENT — LIFESTYLE VARIABLES: DO YOU DRINK ALCOHOL: NO

## 2022-09-28 ASSESSMENT — FIBROSIS 4 INDEX: FIB4 SCORE: 0.45

## 2022-09-28 NOTE — ED NOTES
Pt ambulated with walker to [address]. States that she is normally only on O2 at NOC. 88% RA, placed on 2L by EMS in route .

## 2022-09-28 NOTE — ED NOTES
Pt discharged to home. Pt was given follow up instructions and verbalized understanding of all instructions for discharge and is ambulatory out of ED with steady gait. AOx4. Vitals stable.

## 2022-09-28 NOTE — ED PROVIDER NOTES
ED Provider Note    ED Provider Note    Primary care provider: Heriberto Sharp M.D.  Means of arrival: EMS  History obtained from: Patient    CHIEF COMPLAINT  Chief Complaint   Patient presents with    Shortness of Breath     BIB [facility] for SOB. Pt has Hx of COPD. Pt states she's been feeling tired and weak x2 days. Recently started on oxycodone for Lt leg pain, states she only took two today.     Seen at 2:22 PM.   HPI  Lorene Haro is a 47 y.o. female with history of asthma/COPD, who presents to the Emergency Department shortness of breath beginning today.  She took 2 oxycodones today which is a new prescription, given to her in this emergency department in the past few days.  She thinks that perhaps she overdid it, she is not entirely sure but she feels fatigued, short of breath concomitantly with starting the medication.    She denies any fevers, chills, headache, chest pain, nausea, vomiting, abdominal pain, diarrhea, dysuria, numbness, focal weakness.  She does note some chronic numbness to the left lower extremity, unchanged today.  She is on Ventolin for her asthma, she takes this twice daily but it has not improved her symptoms.  She does not recall if she is on a inhaled corticosteroid.  (There is a prescription for Symbicort filled last month).  History of A. fib, previously was on Coumadin, the patient states that this was stopped and she is not entirely sure why.    REVIEW OF SYSTEMS  See HPI,   Remainder of ROS negative.     PAST MEDICAL HISTORY   has a past medical history of A-fib ([facility]), Asthma, Bipolar 2 disorder ([facility]), Chickenpox, Hypertension, On home oxygen therapy, Other psychological stress, Schizophrenic disorder ([facility]), and Yeast infection of the skin (04/01/2021).    SURGICAL HISTORY   has a past surgical history that includes hysterectomy laparoscopy; colectomy; cholecystectomy; and knee arthroscopy (Left).    SOCIAL HISTORY  Social History     Tobacco Use    Smoking status: Every Day  "    Packs/day: 0.25     Types: Cigarettes    Smokeless tobacco: Never   Vaping Use    Vaping Use: Never used   Substance Use Topics    Alcohol use: Never    Drug use: Never      Social History     Substance and Sexual Activity   Drug Use Never       FAMILY HISTORY  Family History   Problem Relation Age of Onset    No Known Problems Mother     Cancer Sister        CURRENT MEDICATIONS  Reviewed.  See Encounter Summary.     ALLERGIES  Allergies   Allergen Reactions    Penicillin G Rash and Itching     pt reports that she gets a rash all over her body and gets itchy    Amoxicillin Rash and Itching     Tolerates cephalosporins, pt reports that she gets a rash all over her body and gets itchy       PHYSICAL EXAM  VITAL SIGNS: /56   Pulse 79   Temp 36.2 °C (97.1 °F) (Temporal)   Resp 15   Ht 1.549 m (5' 1\")   Wt (!) 135 kg (297 lb 9.9 oz)   LMP  (LMP Unknown)   SpO2 97%   BMI 56.24 kg/m²   Constitutional: Initially sleeping, resting comfortably.  Somewhat lethargic, does stay awake for the exam.  Elevated BMI.  HENT: Normocephalic, Bilateral external ears normal. Nose normal.   Eyes: Conjunctiva normal, non-icteric, EOMI.    Thorax & Lungs: Easy unlabored respirations, difficult to auscultate breath sounds due to COPD and BMI.    Cardiovascular: Regular rate, Regular rhythm, No murmurs, rubs or gallops. Bilateral pulses symmetrical.   Abdomen:  Soft, nontender, nondistended, normal active bowel sounds.   :    Skin: Visualized skin is  Dry, No erythema, No rash.   Musculoskeletal:   3+ bilateral lower extremity edema with chronic venous stasis erythema, scaling of the skin.  No weeping, no warmth.  Skin is not tender.    Neurologic: Alert, Grossly non-focal.   Psychiatric: Normal affect, Normal mood  Lymphatic:  No cervical LAD    EKG   12 lead Interpreted by me  Rhythm:  Normal sinus rhythm   Rate: 82  Axis: normal  Ectopy: none  Conduction: normal  ST Segments: no acute change  T Waves: no acute " change  Clinical Impression: Some artifact, normal sinus rhythm, borderline low voltage, otherwise unremarkable EKG without acute changes     RADIOLOGY  DX-CHEST-PORTABLE (1 VIEW)   Final Result      No acute cardiac or pulmonary abnormalities are identified.            COURSE & MEDICAL DECISION MAKING  Pertinent Labs & Imaging studies reviewed. (See chart for details)    Differential diagnoses include but are not limited to: Medication side effect, COPD exacerbation, sepsis, CAP, pulmonary embolism, CHF    2:22 PM - Medical record reviewed, numerous visits to the emergency department, at least 5 this month, several hospitalizations.  History of COPD, bipolar.    3:20 PM patient still sleeping, resting comfortably, not tachypneic, oxygen 98% on her baseline 2 L nasal cannula.    Decision Making:  This is a pleasant 47 y.o. year old female who presents with shortness of breath and generalized weakness.  The patient is in the ER quite frequently.  Today she appears to be at baseline.  She has no increased work of breathing, she is not hypoxic on her baseline O2.  She was somewhat somnolent on initial evaluation, possibly due to the oxycodone that was recently prescribed to her.  She is neurologically intact.    Work-up today included a D-dimer which is negative, therefore with low Wells criteria acute pulmonary embolus can be ruled out.  Troponin is 28 which is at the patient's baseline.  EKG does not show acute ischemic changes.  The patient is not having chest pain, do not suspect ACS at this time.  Lactate reassuring at 1.4, the patient does not have any sirs criteria, do not suspect sepsis.  Chest x-ray unremarkable at baseline.  proBNP 168 which is actually quite improved compared to prior values.    At this point I do not see any emergent process.  Work-up very unrevealing today.  Recommend follow-up with her primary care physician.  Consider discontinue the oxycodone.      Heart Score: 3      Discharge  Medications:  Discharge Medication List as of 9/28/2022  3:50 PM          The patient was discharged home (see d/c instructions) was told to return immediately for any signs or symptoms listed, or any worsening at all.  The patient verbally agreed to the discharge precautions and follow-up plan which is documented in EPIC.        FINAL IMPRESSION  1. Dyspnea, unspecified type    2. Medication side effect

## 2022-09-28 NOTE — ED TRIAGE NOTES
Chief Complaint   Patient presents with    Shortness of Breath     BIB [facility] for SOB. Pt has Hx of COPD. Pt states she's been feeling tired and weak x2 days. Recently started on oxycodone for Lt leg pain, states she only took two today.     Pt educated upon triage process and told to inform  staff of any changes in condition so that Pt may be reassessed. No further questions at this time. Pt sitting out in lobby.

## 2022-09-29 LAB — BLOOD CULTURE HOLD CXBCH: NORMAL

## 2022-09-30 ENCOUNTER — HOSPITAL ENCOUNTER (EMERGENCY)
Facility: MEDICAL CENTER | Age: 47
End: 2022-09-30
Attending: EMERGENCY MEDICINE
Payer: MEDICAID

## 2022-09-30 ENCOUNTER — APPOINTMENT (OUTPATIENT)
Dept: RADIOLOGY | Facility: MEDICAL CENTER | Age: 47
End: 2022-09-30
Attending: EMERGENCY MEDICINE
Payer: MEDICAID

## 2022-09-30 VITALS
HEART RATE: 71 BPM | SYSTOLIC BLOOD PRESSURE: 135 MMHG | RESPIRATION RATE: 14 BRPM | OXYGEN SATURATION: 96 % | DIASTOLIC BLOOD PRESSURE: 65 MMHG | TEMPERATURE: 98.1 F | WEIGHT: 293 LBS | BODY MASS INDEX: 53.92 KG/M2 | HEIGHT: 62 IN

## 2022-09-30 DIAGNOSIS — Z76.0 MEDICATION REFILL: ICD-10-CM

## 2022-09-30 DIAGNOSIS — S39.012A STRAIN OF LUMBAR REGION, INITIAL ENCOUNTER: ICD-10-CM

## 2022-09-30 DIAGNOSIS — R05.9 COUGH: ICD-10-CM

## 2022-09-30 PROCEDURE — 96372 THER/PROPH/DIAG INJ SC/IM: CPT

## 2022-09-30 PROCEDURE — 700111 HCHG RX REV CODE 636 W/ 250 OVERRIDE (IP): Performed by: EMERGENCY MEDICINE

## 2022-09-30 PROCEDURE — 700102 HCHG RX REV CODE 250 W/ 637 OVERRIDE(OP): Performed by: EMERGENCY MEDICINE

## 2022-09-30 PROCEDURE — 99284 EMERGENCY DEPT VISIT MOD MDM: CPT

## 2022-09-30 PROCEDURE — A9270 NON-COVERED ITEM OR SERVICE: HCPCS | Performed by: EMERGENCY MEDICINE

## 2022-09-30 PROCEDURE — 71045 X-RAY EXAM CHEST 1 VIEW: CPT

## 2022-09-30 RX ORDER — FUROSEMIDE 40 MG/1
20 TABLET ORAL ONCE
Status: COMPLETED | OUTPATIENT
Start: 2022-09-30 | End: 2022-09-30

## 2022-09-30 RX ORDER — CYCLOBENZAPRINE HCL 5 MG
5-10 TABLET ORAL 3 TIMES DAILY PRN
Qty: 30 TABLET | Refills: 0 | Status: SHIPPED | OUTPATIENT
Start: 2022-09-30 | End: 2022-10-10

## 2022-09-30 RX ORDER — FUROSEMIDE 40 MG/1
20 TABLET ORAL DAILY
Qty: 15 TABLET | Refills: 0 | Status: ON HOLD | OUTPATIENT
Start: 2022-09-30 | End: 2022-10-02 | Stop reason: SDUPTHER

## 2022-09-30 RX ORDER — HYDROMORPHONE HYDROCHLORIDE 1 MG/ML
1 INJECTION, SOLUTION INTRAMUSCULAR; INTRAVENOUS; SUBCUTANEOUS ONCE
Status: COMPLETED | OUTPATIENT
Start: 2022-09-30 | End: 2022-09-30

## 2022-09-30 RX ORDER — CYCLOBENZAPRINE HCL 10 MG
5 TABLET ORAL ONCE
Status: COMPLETED | OUTPATIENT
Start: 2022-09-30 | End: 2022-09-30

## 2022-09-30 RX ADMIN — HYDROMORPHONE HYDROCHLORIDE 1 MG: 1 INJECTION, SOLUTION INTRAMUSCULAR; INTRAVENOUS; SUBCUTANEOUS at 20:04

## 2022-09-30 RX ADMIN — FUROSEMIDE 20 MG: 40 TABLET ORAL at 19:51

## 2022-09-30 RX ADMIN — CYCLOBENZAPRINE 5 MG: 10 TABLET, FILM COATED ORAL at 19:51

## 2022-09-30 ASSESSMENT — FIBROSIS 4 INDEX: FIB4 SCORE: 0.66

## 2022-10-01 ENCOUNTER — HOSPITAL ENCOUNTER (OUTPATIENT)
Facility: MEDICAL CENTER | Age: 47
End: 2022-10-02
Attending: EMERGENCY MEDICINE | Admitting: HOSPITALIST
Payer: MEDICAID

## 2022-10-01 ENCOUNTER — APPOINTMENT (OUTPATIENT)
Dept: RADIOLOGY | Facility: MEDICAL CENTER | Age: 47
End: 2022-10-01
Attending: EMERGENCY MEDICINE
Payer: MEDICAID

## 2022-10-01 ENCOUNTER — APPOINTMENT (OUTPATIENT)
Dept: CARDIOLOGY | Facility: MEDICAL CENTER | Age: 47
End: 2022-10-01
Attending: HOSPITALIST
Payer: MEDICAID

## 2022-10-01 DIAGNOSIS — J45.41 MODERATE PERSISTENT ASTHMA WITH EXACERBATION: ICD-10-CM

## 2022-10-01 DIAGNOSIS — I50.32 CHRONIC DIASTOLIC HEART FAILURE (HCC): ICD-10-CM

## 2022-10-01 DIAGNOSIS — J44.9 CHRONIC OBSTRUCTIVE PULMONARY DISEASE, UNSPECIFIED COPD TYPE (HCC): ICD-10-CM

## 2022-10-01 DIAGNOSIS — Z76.0 MEDICATION REFILL: ICD-10-CM

## 2022-10-01 PROBLEM — R06.00 DYSPNEA: Status: ACTIVE | Noted: 2022-10-01

## 2022-10-01 PROBLEM — R74.8 ELEVATED ALKALINE PHOSPHATASE LEVEL: Status: ACTIVE | Noted: 2022-10-01

## 2022-10-01 PROBLEM — E66.2 MORBID OBESITY WITH ALVEOLAR HYPOVENTILATION (HCC): Status: ACTIVE | Noted: 2019-11-18

## 2022-10-01 PROBLEM — J45.901 ASTHMA EXACERBATION: Status: ACTIVE | Noted: 2022-09-02

## 2022-10-01 PROBLEM — J98.4 RESTRICTIVE LUNG DISEASE SECONDARY TO OBESITY: Status: ACTIVE | Noted: 2022-10-01

## 2022-10-01 PROBLEM — E66.9 RESTRICTIVE LUNG DISEASE SECONDARY TO OBESITY: Status: ACTIVE | Noted: 2022-10-01

## 2022-10-01 LAB
ALBUMIN SERPL BCP-MCNC: 3.5 G/DL (ref 3.2–4.9)
ALBUMIN/GLOB SERPL: 1.1 G/DL
ALP SERPL-CCNC: 105 U/L (ref 30–99)
ALT SERPL-CCNC: 16 U/L (ref 2–50)
ANION GAP SERPL CALC-SCNC: 8 MMOL/L (ref 7–16)
AST SERPL-CCNC: 23 U/L (ref 12–45)
BASOPHILS # BLD AUTO: 0.2 % (ref 0–1.8)
BASOPHILS # BLD: 0.01 K/UL (ref 0–0.12)
BILIRUB SERPL-MCNC: 0.5 MG/DL (ref 0.1–1.5)
BUN SERPL-MCNC: 9 MG/DL (ref 8–22)
CALCIUM SERPL-MCNC: 9.1 MG/DL (ref 8.5–10.5)
CHLORIDE SERPL-SCNC: 110 MMOL/L (ref 96–112)
CO2 SERPL-SCNC: 26 MMOL/L (ref 20–33)
CREAT SERPL-MCNC: 0.85 MG/DL (ref 0.5–1.4)
EKG IMPRESSION: NORMAL
EKG IMPRESSION: NORMAL
EOSINOPHIL # BLD AUTO: 0.08 K/UL (ref 0–0.51)
EOSINOPHIL NFR BLD: 1.2 % (ref 0–6.9)
ERYTHROCYTE [DISTWIDTH] IN BLOOD BY AUTOMATED COUNT: 53.4 FL (ref 35.9–50)
FLUAV RNA SPEC QL NAA+PROBE: NEGATIVE
FLUBV RNA SPEC QL NAA+PROBE: NEGATIVE
GFR SERPLBLD CREATININE-BSD FMLA CKD-EPI: 84 ML/MIN/1.73 M 2
GLOBULIN SER CALC-MCNC: 3.1 G/DL (ref 1.9–3.5)
GLUCOSE SERPL-MCNC: 97 MG/DL (ref 65–99)
HCT VFR BLD AUTO: 40.1 % (ref 37–47)
HGB BLD-MCNC: 12.2 G/DL (ref 12–16)
IMM GRANULOCYTES # BLD AUTO: 0.03 K/UL (ref 0–0.11)
IMM GRANULOCYTES NFR BLD AUTO: 0.5 % (ref 0–0.9)
LYMPHOCYTES # BLD AUTO: 0.93 K/UL (ref 1–4.8)
LYMPHOCYTES NFR BLD: 14.5 % (ref 22–41)
MCH RBC QN AUTO: 26.6 PG (ref 27–33)
MCHC RBC AUTO-ENTMCNC: 30.4 G/DL (ref 33.6–35)
MCV RBC AUTO: 87.4 FL (ref 81.4–97.8)
MONOCYTES # BLD AUTO: 0.45 K/UL (ref 0–0.85)
MONOCYTES NFR BLD AUTO: 7 % (ref 0–13.4)
NEUTROPHILS # BLD AUTO: 4.92 K/UL (ref 2–7.15)
NEUTROPHILS NFR BLD: 76.6 % (ref 44–72)
NRBC # BLD AUTO: 0 K/UL
NRBC BLD-RTO: 0 /100 WBC
NT-PROBNP SERPL IA-MCNC: 473 PG/ML (ref 0–125)
PLATELET # BLD AUTO: 187 K/UL (ref 164–446)
PMV BLD AUTO: 11.6 FL (ref 9–12.9)
POTASSIUM SERPL-SCNC: 4.8 MMOL/L (ref 3.6–5.5)
PROCALCITONIN SERPL-MCNC: 0.06 NG/ML
PROT SERPL-MCNC: 6.6 G/DL (ref 6–8.2)
RBC # BLD AUTO: 4.59 M/UL (ref 4.2–5.4)
RSV RNA SPEC QL NAA+PROBE: NEGATIVE
SARS-COV-2 RNA RESP QL NAA+PROBE: NOTDETECTED
SODIUM SERPL-SCNC: 144 MMOL/L (ref 135–145)
SPECIMEN SOURCE: NORMAL
TROPONIN T SERPL-MCNC: 17 NG/L (ref 6–19)
TROPONIN T SERPL-MCNC: 24 NG/L (ref 6–19)
WBC # BLD AUTO: 6.4 K/UL (ref 4.8–10.8)

## 2022-10-01 PROCEDURE — 96375 TX/PRO/DX INJ NEW DRUG ADDON: CPT

## 2022-10-01 PROCEDURE — 700111 HCHG RX REV CODE 636 W/ 250 OVERRIDE (IP): Performed by: EMERGENCY MEDICINE

## 2022-10-01 PROCEDURE — 99285 EMERGENCY DEPT VISIT HI MDM: CPT

## 2022-10-01 PROCEDURE — 71045 X-RAY EXAM CHEST 1 VIEW: CPT

## 2022-10-01 PROCEDURE — G0378 HOSPITAL OBSERVATION PER HR: HCPCS

## 2022-10-01 PROCEDURE — 36415 COLL VENOUS BLD VENIPUNCTURE: CPT

## 2022-10-01 PROCEDURE — 84145 PROCALCITONIN (PCT): CPT

## 2022-10-01 PROCEDURE — 94640 AIRWAY INHALATION TREATMENT: CPT

## 2022-10-01 PROCEDURE — 93306 TTE W/DOPPLER COMPLETE: CPT

## 2022-10-01 PROCEDURE — 99245 OFF/OP CONSLTJ NEW/EST HI 55: CPT | Performed by: INTERNAL MEDICINE

## 2022-10-01 PROCEDURE — 700101 HCHG RX REV CODE 250: Performed by: EMERGENCY MEDICINE

## 2022-10-01 PROCEDURE — 0241U HCHG SARS-COV-2 COVID-19 NFCT DS RESP RNA 4 TRGT MIC: CPT

## 2022-10-01 PROCEDURE — 84484 ASSAY OF TROPONIN QUANT: CPT

## 2022-10-01 PROCEDURE — 700102 HCHG RX REV CODE 250 W/ 637 OVERRIDE(OP): Performed by: HOSPITALIST

## 2022-10-01 PROCEDURE — 700117 HCHG RX CONTRAST REV CODE 255: Performed by: HOSPITALIST

## 2022-10-01 PROCEDURE — 94669 MECHANICAL CHEST WALL OSCILL: CPT

## 2022-10-01 PROCEDURE — 80053 COMPREHEN METABOLIC PANEL: CPT

## 2022-10-01 PROCEDURE — C9803 HOPD COVID-19 SPEC COLLECT: HCPCS | Performed by: EMERGENCY MEDICINE

## 2022-10-01 PROCEDURE — 83880 ASSAY OF NATRIURETIC PEPTIDE: CPT

## 2022-10-01 PROCEDURE — A9270 NON-COVERED ITEM OR SERVICE: HCPCS | Performed by: HOSPITALIST

## 2022-10-01 PROCEDURE — 99220 PR INITIAL OBSERVATION CARE,LEVL III: CPT | Performed by: HOSPITALIST

## 2022-10-01 PROCEDURE — 85025 COMPLETE CBC W/AUTO DIFF WBC: CPT

## 2022-10-01 PROCEDURE — 93005 ELECTROCARDIOGRAM TRACING: CPT | Performed by: EMERGENCY MEDICINE

## 2022-10-01 PROCEDURE — 93005 ELECTROCARDIOGRAM TRACING: CPT

## 2022-10-01 PROCEDURE — 700101 HCHG RX REV CODE 250: Performed by: HOSPITALIST

## 2022-10-01 PROCEDURE — 96374 THER/PROPH/DIAG INJ IV PUSH: CPT

## 2022-10-01 PROCEDURE — 700111 HCHG RX REV CODE 636 W/ 250 OVERRIDE (IP): Performed by: HOSPITALIST

## 2022-10-01 RX ORDER — IPRATROPIUM BROMIDE AND ALBUTEROL SULFATE 2.5; .5 MG/3ML; MG/3ML
3 SOLUTION RESPIRATORY (INHALATION)
Status: DISCONTINUED | OUTPATIENT
Start: 2022-10-01 | End: 2022-10-02

## 2022-10-01 RX ORDER — MONTELUKAST SODIUM 10 MG/1
10 TABLET ORAL EVERY EVENING
Status: SHIPPED | COMMUNITY
End: 2022-11-13 | Stop reason: SDUPTHER

## 2022-10-01 RX ORDER — ONDANSETRON 2 MG/ML
4 INJECTION INTRAMUSCULAR; INTRAVENOUS EVERY 4 HOURS PRN
Status: DISCONTINUED | OUTPATIENT
Start: 2022-10-01 | End: 2022-10-02 | Stop reason: HOSPADM

## 2022-10-01 RX ORDER — DEXAMETHASONE SODIUM PHOSPHATE 4 MG/ML
4 INJECTION, SOLUTION INTRA-ARTICULAR; INTRALESIONAL; INTRAMUSCULAR; INTRAVENOUS; SOFT TISSUE ONCE
Status: COMPLETED | OUTPATIENT
Start: 2022-10-01 | End: 2022-10-01

## 2022-10-01 RX ORDER — AMOXICILLIN 250 MG
2 CAPSULE ORAL 2 TIMES DAILY
Status: DISCONTINUED | OUTPATIENT
Start: 2022-10-01 | End: 2022-10-02 | Stop reason: HOSPADM

## 2022-10-01 RX ORDER — BISACODYL 10 MG
10 SUPPOSITORY, RECTAL RECTAL
Status: DISCONTINUED | OUTPATIENT
Start: 2022-10-01 | End: 2022-10-02 | Stop reason: HOSPADM

## 2022-10-01 RX ORDER — PREDNISONE 20 MG/1
40 TABLET ORAL DAILY
Status: DISCONTINUED | OUTPATIENT
Start: 2022-10-02 | End: 2022-10-02 | Stop reason: HOSPADM

## 2022-10-01 RX ORDER — POLYETHYLENE GLYCOL 3350 17 G/17G
1 POWDER, FOR SOLUTION ORAL
Status: DISCONTINUED | OUTPATIENT
Start: 2022-10-01 | End: 2022-10-02 | Stop reason: HOSPADM

## 2022-10-01 RX ORDER — CYCLOBENZAPRINE HCL 10 MG
5-10 TABLET ORAL 3 TIMES DAILY PRN
Status: DISCONTINUED | OUTPATIENT
Start: 2022-10-01 | End: 2022-10-02 | Stop reason: HOSPADM

## 2022-10-01 RX ORDER — PROMETHAZINE HYDROCHLORIDE 25 MG/1
12.5-25 SUPPOSITORY RECTAL EVERY 4 HOURS PRN
Status: DISCONTINUED | OUTPATIENT
Start: 2022-10-01 | End: 2022-10-02 | Stop reason: HOSPADM

## 2022-10-01 RX ORDER — NYSTATIN 100000 U/G
1 CREAM TOPICAL 2 TIMES DAILY
Status: DISCONTINUED | OUTPATIENT
Start: 2022-10-01 | End: 2022-10-02 | Stop reason: HOSPADM

## 2022-10-01 RX ORDER — FUROSEMIDE 10 MG/ML
60 INJECTION INTRAMUSCULAR; INTRAVENOUS
Status: DISCONTINUED | OUTPATIENT
Start: 2022-10-01 | End: 2022-10-02 | Stop reason: HOSPADM

## 2022-10-01 RX ORDER — OXYCODONE HYDROCHLORIDE AND ACETAMINOPHEN 5; 325 MG/1; MG/1
1 TABLET ORAL EVERY 4 HOURS PRN
Status: DISCONTINUED | OUTPATIENT
Start: 2022-10-01 | End: 2022-10-02 | Stop reason: HOSPADM

## 2022-10-01 RX ORDER — ACETAMINOPHEN 325 MG/1
650 TABLET ORAL EVERY 6 HOURS PRN
Status: DISCONTINUED | OUTPATIENT
Start: 2022-10-01 | End: 2022-10-02 | Stop reason: HOSPADM

## 2022-10-01 RX ORDER — FAMOTIDINE 20 MG/1
20 TABLET, FILM COATED ORAL 2 TIMES DAILY
Status: DISCONTINUED | OUTPATIENT
Start: 2022-10-01 | End: 2022-10-02 | Stop reason: HOSPADM

## 2022-10-01 RX ORDER — ONDANSETRON 4 MG/1
4 TABLET, ORALLY DISINTEGRATING ORAL EVERY 4 HOURS PRN
Status: DISCONTINUED | OUTPATIENT
Start: 2022-10-01 | End: 2022-10-02 | Stop reason: HOSPADM

## 2022-10-01 RX ORDER — ARIPIPRAZOLE 15 MG/1
30 TABLET ORAL EVERY MORNING
Status: DISCONTINUED | OUTPATIENT
Start: 2022-10-01 | End: 2022-10-02 | Stop reason: HOSPADM

## 2022-10-01 RX ORDER — NITROGLYCERIN 0.4 MG/1
0.4 TABLET SUBLINGUAL PRN
Status: DISCONTINUED | OUTPATIENT
Start: 2022-10-01 | End: 2022-10-02 | Stop reason: HOSPADM

## 2022-10-01 RX ORDER — METHOCARBAMOL 750 MG/1
750 TABLET, FILM COATED ORAL 4 TIMES DAILY
Status: DISCONTINUED | OUTPATIENT
Start: 2022-10-01 | End: 2022-10-02 | Stop reason: HOSPADM

## 2022-10-01 RX ORDER — LABETALOL HYDROCHLORIDE 5 MG/ML
10 INJECTION, SOLUTION INTRAVENOUS EVERY 4 HOURS PRN
Status: DISCONTINUED | OUTPATIENT
Start: 2022-10-01 | End: 2022-10-02 | Stop reason: HOSPADM

## 2022-10-01 RX ORDER — ATORVASTATIN CALCIUM 20 MG/1
20 TABLET, FILM COATED ORAL
Status: DISCONTINUED | OUTPATIENT
Start: 2022-10-01 | End: 2022-10-02 | Stop reason: HOSPADM

## 2022-10-01 RX ORDER — IPRATROPIUM BROMIDE AND ALBUTEROL SULFATE 2.5; .5 MG/3ML; MG/3ML
3 SOLUTION RESPIRATORY (INHALATION) ONCE
Status: COMPLETED | OUTPATIENT
Start: 2022-10-01 | End: 2022-10-01

## 2022-10-01 RX ORDER — TRAZODONE HYDROCHLORIDE 50 MG/1
50 TABLET ORAL
Status: DISCONTINUED | OUTPATIENT
Start: 2022-10-01 | End: 2022-10-02 | Stop reason: HOSPADM

## 2022-10-01 RX ORDER — PROMETHAZINE HYDROCHLORIDE 25 MG/1
12.5-25 TABLET ORAL EVERY 4 HOURS PRN
Status: DISCONTINUED | OUTPATIENT
Start: 2022-10-01 | End: 2022-10-02 | Stop reason: HOSPADM

## 2022-10-01 RX ORDER — IPRATROPIUM BROMIDE AND ALBUTEROL SULFATE 2.5; .5 MG/3ML; MG/3ML
3 SOLUTION RESPIRATORY (INHALATION)
Status: DISCONTINUED | OUTPATIENT
Start: 2022-10-01 | End: 2022-10-02 | Stop reason: HOSPADM

## 2022-10-01 RX ORDER — PROCHLORPERAZINE EDISYLATE 5 MG/ML
5-10 INJECTION INTRAMUSCULAR; INTRAVENOUS EVERY 4 HOURS PRN
Status: DISCONTINUED | OUTPATIENT
Start: 2022-10-01 | End: 2022-10-02 | Stop reason: HOSPADM

## 2022-10-01 RX ADMIN — IPRATROPIUM BROMIDE AND ALBUTEROL SULFATE 3 ML: .5; 2.5 SOLUTION RESPIRATORY (INHALATION) at 22:21

## 2022-10-01 RX ADMIN — HUMAN ALBUMIN MICROSPHERES AND PERFLUTREN 3 ML: 10; .22 INJECTION, SOLUTION INTRAVENOUS at 19:35

## 2022-10-01 RX ADMIN — DEXAMETHASONE SODIUM PHOSPHATE 4 MG: 4 INJECTION, SOLUTION INTRA-ARTICULAR; INTRALESIONAL; INTRAMUSCULAR; INTRAVENOUS; SOFT TISSUE at 11:00

## 2022-10-01 RX ADMIN — ATORVASTATIN CALCIUM 20 MG: 20 TABLET, FILM COATED ORAL at 20:34

## 2022-10-01 RX ADMIN — IPRATROPIUM BROMIDE AND ALBUTEROL SULFATE 3 ML: .5; 2.5 SOLUTION RESPIRATORY (INHALATION) at 10:41

## 2022-10-01 RX ADMIN — ACETAMINOPHEN 650 MG: 325 TABLET, FILM COATED ORAL at 18:29

## 2022-10-01 RX ADMIN — TRAZODONE HYDROCHLORIDE 50 MG: 50 TABLET ORAL at 20:35

## 2022-10-01 RX ADMIN — IPRATROPIUM BROMIDE AND ALBUTEROL SULFATE 3 ML: .5; 2.5 SOLUTION RESPIRATORY (INHALATION) at 14:25

## 2022-10-01 RX ADMIN — METHOCARBAMOL 750 MG: 750 TABLET ORAL at 18:30

## 2022-10-01 RX ADMIN — FUROSEMIDE 60 MG: 10 INJECTION, SOLUTION INTRAMUSCULAR; INTRAVENOUS at 14:15

## 2022-10-01 RX ADMIN — ARIPIPRAZOLE 30 MG: 15 TABLET ORAL at 14:15

## 2022-10-01 RX ADMIN — FAMOTIDINE 20 MG: 20 TABLET, FILM COATED ORAL at 18:30

## 2022-10-01 RX ADMIN — METHOCARBAMOL 750 MG: 750 TABLET ORAL at 14:15

## 2022-10-01 ASSESSMENT — ENCOUNTER SYMPTOMS
WHEEZING: 0
HEARTBURN: 0
COUGH: 1
GASTROINTESTINAL NEGATIVE: 1
BRUISES/BLEEDS EASILY: 0
CARDIOVASCULAR NEGATIVE: 1
NAUSEA: 0
PALPITATIONS: 0
FEVER: 0
DEPRESSION: 0
DIAPHORESIS: 0
CONSTIPATION: 0
BACK PAIN: 1
COUGH: 0
CHILLS: 0
VOMITING: 0
HEADACHES: 1
FOCAL WEAKNESS: 0
PSYCHIATRIC NEGATIVE: 1
WHEEZING: 1
BLOOD IN STOOL: 0
DIZZINESS: 1
EYES NEGATIVE: 1
ABDOMINAL PAIN: 0
LOSS OF CONSCIOUSNESS: 0
MYALGIAS: 1
HEMOPTYSIS: 0
NERVOUS/ANXIOUS: 0
DIARRHEA: 0
DOUBLE VISION: 0
SHORTNESS OF BREATH: 1
SEIZURES: 0

## 2022-10-01 ASSESSMENT — PATIENT HEALTH QUESTIONNAIRE - PHQ9
5. POOR APPETITE OR OVEREATING: NOT AT ALL
9. THOUGHTS THAT YOU WOULD BE BETTER OFF DEAD, OR OF HURTING YOURSELF: NOT AT ALL
SUM OF ALL RESPONSES TO PHQ9 QUESTIONS 1 AND 2: 1
6. FEELING BAD ABOUT YOURSELF - OR THAT YOU ARE A FAILURE OR HAVE LET YOURSELF OR YOUR FAMILY DOWN: NOT AL ALL
2. FEELING DOWN, DEPRESSED, IRRITABLE, OR HOPELESS: SEVERAL DAYS
SUM OF ALL RESPONSES TO PHQ QUESTIONS 1-9: 5
4. FEELING TIRED OR HAVING LITTLE ENERGY: SEVERAL DAYS
1. LITTLE INTEREST OR PLEASURE IN DOING THINGS: NOT AT ALL
7. TROUBLE CONCENTRATING ON THINGS, SUCH AS READING THE NEWSPAPER OR WATCHING TELEVISION: SEVERAL DAYS
8. MOVING OR SPEAKING SO SLOWLY THAT OTHER PEOPLE COULD HAVE NOTICED. OR THE OPPOSITE, BEING SO FIGETY OR RESTLESS THAT YOU HAVE BEEN MOVING AROUND A LOT MORE THAN USUAL: SEVERAL DAYS
3. TROUBLE FALLING OR STAYING ASLEEP OR SLEEPING TOO MUCH: SEVERAL DAYS

## 2022-10-01 ASSESSMENT — LIFESTYLE VARIABLES
AVERAGE NUMBER OF DAYS PER WEEK YOU HAVE A DRINK CONTAINING ALCOHOL: 0
CONSUMPTION TOTAL: NEGATIVE
EVER HAD A DRINK FIRST THING IN THE MORNING TO STEADY YOUR NERVES TO GET RID OF A HANGOVER: NO
HOW MANY TIMES IN THE PAST YEAR HAVE YOU HAD 5 OR MORE DRINKS IN A DAY: 0
DOES PATIENT WANT TO STOP DRINKING: NO
TOTAL SCORE: 0
ON A TYPICAL DAY WHEN YOU DRINK ALCOHOL HOW MANY DRINKS DO YOU HAVE: 0
HAVE PEOPLE ANNOYED YOU BY CRITICIZING YOUR DRINKING: NO
TOTAL SCORE: 0
HAVE YOU EVER FELT YOU SHOULD CUT DOWN ON YOUR DRINKING: NO
ALCOHOL_USE: NO
TOTAL SCORE: 0
EVER FELT BAD OR GUILTY ABOUT YOUR DRINKING: NO

## 2022-10-01 ASSESSMENT — FIBROSIS 4 INDEX
FIB4 SCORE: 0.66
FIB4 SCORE: 1.51

## 2022-10-01 ASSESSMENT — PAIN DESCRIPTION - PAIN TYPE: TYPE: ACUTE PAIN

## 2022-10-01 ASSESSMENT — VISUAL ACUITY: OU: 1

## 2022-10-01 NOTE — ED TRIAGE NOTES
"Chief Complaint   Patient presents with    Low Back Pain     Pt emelia [facility] from home. C/o lower back pain after \"reached in to get something\", then sat down and started having lower back pain. Ambulatory using walker. Denies trauma/fall. Pt ran out of pain meidcation     Denies bowel/bladder incontinence. Also noted bilateral lower extremity edema x years. States also ran out of her furosemide x 2 months. Was seen here 2 days ago for same.   "

## 2022-10-01 NOTE — ED NOTES
Pharmacy Medication Reconciliation      ~Medication reconciliation updated and complete per patient at bedside & patient home pharmacy  ~Allergies have been verified and updated   ~No oral ABX within the last 30 days  ~Patient home pharmacy: [facility]

## 2022-10-01 NOTE — ED TRIAGE NOTES
"Chief Complaint   Patient presents with    Chest Pain     Beginning last night, mid sternal. Not worsening with inspiration or expiration    Shortness of Breath     Increased work of breathing and wheezing      Pt BIBA from home. Pt was seen and dc here yesterday for cellulitis of BL lower extremities. EMS gave 1x albuterol tx which improved pt's sp02 sat and work of breathing.       /65   Pulse 72   Temp 36.7 °C (98.1 °F) (Temporal)   Resp (!) 22   Ht 1.575 m (5' 2\")   Wt (!) 135 kg (297 lb)   LMP  (LMP Unknown)   SpO2 88%   BMI 54.32 kg/m²       "

## 2022-10-01 NOTE — ED NOTES
Patient [facility] transport setup for ride back home tonight. Patient discharged with paperwork and walker at this time to [facility].

## 2022-10-01 NOTE — DISCHARGE INSTRUCTIONS
Please discuss the following findings with your regular doctor:  DX-CHEST-PORTABLE (1 VIEW)   Final Result         1.  Hazy opacity left costophrenic angle, could represent subtle infiltrate.   2.  Cardiomegaly        Labs Reviewed - No data to display

## 2022-10-01 NOTE — ED PROVIDER NOTES
"ED Provider Note    Scribed for Gabriel Mohamud M.D. by Lane Kate. 9/30/2022  7:23 PM    Primary care provider: Heriberto Sharp M.D.  Means of arrival: EMS  History obtained from: Patient  History limited by: None    CHIEF COMPLAINT  Chief Complaint   Patient presents with    Low Back Pain     Pt [facility] from home. C/o lower back pain after \"reached in to get something\", then sat down and started having lower back pain. Ambulatory using walker. Denies trauma/fall. Pt ran out of pain meidcation    Leg Swelling     In both lower extremities x years. \"Ran out of lasix x 2 months\"       HPI  Lorene Haro is a 47 y.o. female who presents to the Emergency Department for evaluation of lower back pain onset this morning. She notes her back pain was radiating down to her legs. Her pain began after sitting down this morning. Patient states she was set to follow up with her primary care in November. She reports associated leg swelling, bleeding from her legs, and cough. Patient states she ran out of Lasix and was not able to get a refill because she couldn't get an appointment with her doctor. She notes she is allergic to amoxicillin and penicillin.      REVIEW OF SYSTEMS  Pertinent positives include: lower back pain, leg swelling, and bleeding from her legs. See history of present illness. All other systems are negative.   C    PAST MEDICAL HISTORY   has a past medical history of A-fib ([facility]), Asthma, Bipolar 2 disorder ([facility]), Chickenpox, Hypertension, On home oxygen therapy, Other psychological stress, Schizophrenic disorder ([facility]), and Yeast infection of the skin (04/01/2021).    SURGICAL HISTORY   has a past surgical history that includes hysterectomy laparoscopy; colectomy; cholecystectomy; and knee arthroscopy (Left).    SOCIAL HISTORY  Social History     Tobacco Use    Smoking status: Every Day     Packs/day: 0.25     Types: Cigarettes    Smokeless tobacco: Never   Vaping Use    Vaping Use: Never used " "  Substance Use Topics    Alcohol use: Never    Drug use: Never      Social History     Substance and Sexual Activity   Drug Use Never       FAMILY HISTORY  Family History   Problem Relation Age of Onset    No Known Problems Mother     Cancer Sister        CURRENT MEDICATIONS  Home Medications       Reviewed by Rosamaria Lowe R.N. (Registered Nurse) on 09/30/22 at 1825  Med List Status: Partial     Medication Last Dose Status   aripiprazole (ABILIFY) 30 MG tablet  Active   atorvastatin (LIPITOR) 20 MG Tab  Active   famotidine (PEPCID) 20 MG Tab  Active   ketorolac (TORADOL) 10 MG Tab  Active   methocarbamol (ROBAXIN) 750 MG Tab  Active   methylPREDNISolone (MEDROL) 4 MG Tab  Active   nitroglycerin (NITROSTAT) 0.4 MG SL Tab  Active   nystatin (MYCOSTATIN) 388278 UNIT/GM Cream topical cream  Active   oxyCODONE-acetaminophen (PERCOCET) 5-325 MG Tab  Active   traZODone (DESYREL) 50 MG Tab  Active                    ALLERGIES  Allergies   Allergen Reactions    Penicillin G Rash and Itching     pt reports that she gets a rash all over her body and gets itchy    Amoxicillin Rash and Itching     Tolerates cephalosporins, pt reports that she gets a rash all over her body and gets itchy       PHYSICAL EXAM  VITAL SIGNS: /57   Pulse 72   Temp 36.7 °C (98 °F) (Temporal)   Resp 18   Ht 1.575 m (5' 2\")   Wt (!) 135 kg (297 lb)   LMP  (LMP Unknown)   SpO2 94%   BMI 54.32 kg/m²     Constitutional: Alert in no apparent distress.  HENT: No signs of trauma, Bilateral external ears normal, Nose normal. Uvula midline.   Eyes: Pupils are equal and reactive, Conjunctiva normal, Non-icteric.   Neck: Normal range of motion, No tenderness, Supple, No stridor.   Lymphatic: No lymphadenopathy noted.   Cardiovascular: Regular rate and rhythm, no murmurs.   Thorax & Lungs: Normal breath sounds, No respiratory distress, No wheezing, No chest tenderness.   Abdomen:  Soft, No tenderness, No peritoneal signs, No masses, No pulsatile " masses.   Skin: Warm, Dry, No erythema, No rash.   Back: No bony tenderness, No CVA tenderness.   Extremities: Intact distal pulses, Diffuse bilateral lower extremity edema with erythematous venous stasis ulcer changes, No cyanosis.  Musculoskeletal: Good range of motion in all major joints. No tenderness to palpation or major deformities noted.   Neurologic: Alert , Normal motor function, Normal sensory function, No focal deficits noted.   Psychiatric: Affect normal, Judgment normal, Mood normal.       DIAGNOSTIC STUDIES / PROCEDURES    LABS  Labs Reviewed - No data to display   All labs reviewed by me.    RADIOLOGY  DX-CHEST-PORTABLE (1 VIEW)   Final Result         1.  Hazy opacity left costophrenic angle, could represent subtle infiltrate.   2.  Cardiomegaly        The radiologist's interpretation of all radiological studies have been reviewed by me.    COURSE & MEDICAL DECISION MAKING  Nursing notes, Juanis SHRESTHA reviewed in chart.    47 y.o. female p/w chief complaint of lower back pain.    7:23 PM Patient seen and examined at bedside.      I verified that the patient was wearing a mask and I was wearing appropriate PPE every time I entered the room. The patient's mask was on the patient at all times during my encounter except for a brief view of the oropharynx.     The differential diagnoses include but are not limited to:     #Leg swelling  CXR ordered to r/o cardiac/pulmonary abnormality, hazy opacity seen on prior x-ray and given concern for frequent emergency department visits and consequences of multiple different types of antibiotics plan for patient to obtain follow-up appointment or to return if cough worsens or progresses  Pt treated with Lasix.  And refilled prescription for Lasix    #Back pain  No weakness or numbness to suggest cauda equina syndrome.  Pt w/ No trauma. No IVDU/fever. No history of cancer/unintentional weight loss. No bowel/bladder dysfunction. No numbness/weakness/saddle  anesthesia.  Doubt infectious etiology given no fever  No imaging ordered at this time given no trauma to suggest fx and pt under age 63  No e/o rash to suggest zoster  Pain likely represents MSK pain.  Pt given below meds for sx relief in ED  Pt agrees to f/u w/ PCP or physical therapist if pain persists for greater than 1 wk.  Pt instructed to return to ED if pain continues to persist or worsens.                FINAL IMPRESSION  1. Strain of lumbar region, initial encounter    2. Medication refill    3. Cough          ILane ([employer]), am scribing for, and in the presence of, Gabriel Mohamud M.D..    Electronically signed by: Lane Kate (Ade), 9/30/2022    IGabriel M.D. personally performed the services described in this documentation, as scribed by Lane Kate in my presence, and it is both accurate and complete.    The note accurately reflects work and decisions made by me.  Gabriel Mohamud M.D.  9/30/2022  10:20 PM

## 2022-10-01 NOTE — ASSESSMENT & PLAN NOTE
Tobacco cessation education provided  Patient smokes 1 pack/day since age 16.  Patient says she quit last week.

## 2022-10-01 NOTE — ASSESSMENT & PLAN NOTE
Chronic diastolic heart failure versus acute systolic heart failure on top with  Echocardiogram ordered  Lasix ordered  Monitor intake and output  Monitor potassium levels

## 2022-10-01 NOTE — H&P
Hospital Medicine History & Physical Note    Date of Service  10/1/2022    Primary Care Physician  Heriberto Sharp M.D.    Consultants  None    Specialist Names: None    Code Status  Full Code    Chief Complaint  Chief Complaint   Patient presents with    Chest Pain     Beginning last night, mid sternal. Not worsening with inspiration or expiration    Shortness of Breath     Increased work of breathing and wheezing        History of Presenting Illness  Lorene Haro is a 47 y.o. female who presented 10/1/2022 with recurrent shortness of breath.  The patient was here in the emergency room yesterday when she was then discharged home.  The patient returns again this morning complaining that last night she was short of breath again.  The patient uses 3 L by nasal cannula of oxygen at home.  She is a long-term smoker quit last week.  The patient apparently has been smoking for almost 30 years a pack a day.  Patient also unfortunate has morbid obesity and because of this has sleep apnea as well and has pickwickian syndrome.  The patient at this point will need continued oxygen support nebulizer treatments steroids and aggressive management.  Patient will be evaluated in the hospital for both CHF COPD as well as possible cardiac ischemia and a stress test has been ordered for her as well..    I discussed the plan of care with patient and emergency room physician .    Review of Systems  Review of Systems   Constitutional:  Positive for malaise/fatigue. Negative for chills, diaphoresis and fever.   HENT: Negative.     Eyes: Negative.  Negative for double vision.   Respiratory:  Positive for shortness of breath. Negative for cough, hemoptysis and wheezing.    Cardiovascular: Negative.  Negative for chest pain, palpitations and leg swelling.   Gastrointestinal: Negative.  Negative for abdominal pain, blood in stool, constipation, diarrhea, heartburn, nausea and vomiting.   Genitourinary: Negative.  Negative for frequency,  hematuria and urgency.   Musculoskeletal:  Positive for back pain and myalgias. Negative for joint pain.   Skin: Negative.  Negative for itching and rash.   Neurological:  Positive for dizziness and headaches. Negative for focal weakness, seizures and loss of consciousness.   Endo/Heme/Allergies: Negative.  Does not bruise/bleed easily.   Psychiatric/Behavioral: Negative.  Negative for depression and suicidal ideas. The patient is not nervous/anxious.    All other systems reviewed and are negative.    Past Medical History   has a past medical history of A-fib ([facility]), Asthma, Bipolar 2 disorder ([facility]), Chickenpox, Hypertension, On home oxygen therapy, Other psychological stress, Schizophrenic disorder ([facility]), and Yeast infection of the skin (04/01/2021).    Surgical History   has a past surgical history that includes hysterectomy laparoscopy; colectomy; cholecystectomy; and knee arthroscopy (Left).     Family History  family history includes Cancer in her sister; No Known Problems in her mother.   Family history reviewed with patient. There is no family history that is pertinent to the chief complaint.     Social History   reports that she has been smoking cigarettes. She has been smoking an average of .25 packs per day. She has never used smokeless tobacco. She reports that she does not drink alcohol and does not use drugs.    Allergies  Allergies   Allergen Reactions    Penicillin G Rash and Itching     pt reports that she gets a rash all over her body and gets itchy    Amoxicillin Rash and Itching     Tolerates cephalosporins, pt reports that she gets a rash all over her body and gets itchy       Medications  Prior to Admission Medications   Prescriptions Last Dose Informant Patient Reported? Taking?   aripiprazole (ABILIFY) 30 MG tablet  Patient's Home Pharmacy Yes No   Sig: Take 30 mg by mouth every morning.   atorvastatin (LIPITOR) 20 MG Tab  Patient's Home Pharmacy Yes No   Sig: Take 20 mg by mouth at bedtime.    cyclobenzaprine (FLEXERIL) 5 mg tablet   No No   Sig: Take 1-2 Tablets by mouth 3 times a day as needed for Mild Pain for up to 10 days.   famotidine (PEPCID) 20 MG Tab  Patient's Home Pharmacy No No   Sig: Take 1 Tablet by mouth 2 times a day.   furosemide (LASIX) 40 MG Tab   No No   Sig: Take 0.5 Tablets by mouth every day for 30 days.   ketorolac (TORADOL) 10 MG Tab   No No   Sig: Take 1 Tablet by mouth every four hours as needed for Moderate Pain. With food   methocarbamol (ROBAXIN) 750 MG Tab   No No   Sig: Take 1 Tablet by mouth 4 times a day. Take as needed for muscle spasm/strain.   methylPREDNISolone (MEDROL) 4 MG Tab   No No   Sig: Take as per the package instructions.   nitroglycerin (NITROSTAT) 0.4 MG SL Tab  Patient No No   Sig: Place 1 tablet under the tongue as needed for Chest Pain.   nystatin (MYCOSTATIN) 386335 UNIT/GM Cream topical cream   No No   Sig: Apply 1 g topically 2 times a day.   oxyCODONE-acetaminophen (PERCOCET) 5-325 MG Tab   No No   Sig: Take 1 Tablet by mouth every four hours as needed for Moderate Pain (pain) for up to 5 days.   traZODone (DESYREL) 50 MG Tab  Patient's Home Pharmacy No No   Sig: Take 1 Tablet by mouth at bedtime.      Facility-Administered Medications: None       Physical Exam  Temp:  [36.7 °C (98 °F)-36.7 °C (98.1 °F)] 36.7 °C (98.1 °F)  Pulse:  [71-79] 77  Resp:  [14-22] 15  BP: (107-140)/(53-65) 140/63  SpO2:  [88 %-100 %] 100 %  Blood Pressure: (!) 140/63   Temperature: 36.7 °C (98.1 °F)   Pulse: 77   Respiration: 15   Pulse Oximetry: 100 %       Physical Exam  Vitals and nursing note reviewed. Exam conducted with a chaperone present.   Constitutional:       General: She is awake.      Appearance: Normal appearance. She is well-developed, well-groomed and normal weight.   HENT:      Head: Normocephalic and atraumatic.      Jaw: There is normal jaw occlusion. No trismus.      Salivary Glands: Right salivary gland is not tender. Left salivary gland is not  tender.      Right Ear: External ear normal.      Left Ear: External ear normal.      Mouth/Throat:      Mouth: Mucous membranes are moist.      Pharynx: Oropharynx is clear.   Eyes:      General: Lids are normal. Vision grossly intact.      Extraocular Movements: Extraocular movements intact.      Conjunctiva/sclera: Conjunctivae normal.      Right eye: Right conjunctiva is not injected. No exudate.     Left eye: Left conjunctiva is not injected. No exudate.     Pupils: Pupils are equal, round, and reactive to light.   Neck:      Thyroid: No thyroid mass.      Vascular: No hepatojugular reflux or JVD.      Trachea: No abnormal tracheal secretions or tracheal deviation.   Cardiovascular:      Rate and Rhythm: Normal rate and regular rhythm. Occasional Extrasystoles are present.     Pulses: Normal pulses.      Heart sounds: Normal heart sounds. No murmur heard.    No friction rub.   Pulmonary:      Effort: Tachypnea and accessory muscle usage present.      Breath sounds: Decreased air movement present. Examination of the right-upper field reveals decreased breath sounds and wheezing. Examination of the left-upper field reveals decreased breath sounds and wheezing. Examination of the right-middle field reveals decreased breath sounds and wheezing. Examination of the left-middle field reveals decreased breath sounds and wheezing. Examination of the right-lower field reveals decreased breath sounds. Examination of the left-lower field reveals decreased breath sounds. Decreased breath sounds and wheezing present. No rhonchi.   Abdominal:      General: Abdomen is flat.      Palpations: Abdomen is soft.      Tenderness: There is no abdominal tenderness. There is no right CVA tenderness or left CVA tenderness.      Hernia: No hernia is present.   Musculoskeletal:         General: Normal range of motion.      Cervical back: Full passive range of motion without pain, normal range of motion and neck supple. No rigidity. No  muscular tenderness.      Right lower leg: Edema present.      Left lower leg: Edema present.   Lymphadenopathy:      Head:      Right side of head: No submental adenopathy.      Left side of head: No submental adenopathy.      Cervical:      Right cervical: No superficial cervical adenopathy.     Left cervical: No superficial cervical adenopathy.      Upper Body:      Right upper body: No supraclavicular adenopathy.      Left upper body: No supraclavicular adenopathy.   Skin:     General: Skin is warm and dry.      Capillary Refill: Capillary refill takes less than 2 seconds.      Coloration: Skin is not cyanotic or pale.      Findings: Erythema (Both lower extremities) present. No abrasion or bruising.   Neurological:      General: No focal deficit present.      Mental Status: She is alert and oriented to person, place, and time. Mental status is at baseline.      GCS: GCS eye subscore is 4. GCS verbal subscore is 5. GCS motor subscore is 6.      Cranial Nerves: No cranial nerve deficit.      Sensory: No sensory deficit.      Motor: Motor function is intact.      Deep Tendon Reflexes:      Reflex Scores:       Tricep reflexes are 2+ on the right side and 2+ on the left side.       Bicep reflexes are 2+ on the right side and 2+ on the left side.       Brachioradialis reflexes are 2+ on the right side and 2+ on the left side.       Patellar reflexes are 2+ on the right side and 2+ on the left side.       Achilles reflexes are 2+ on the right side and 2+ on the left side.  Psychiatric:         Attention and Perception: Attention and perception normal.         Mood and Affect: Mood normal.         Speech: Speech normal.         Behavior: Behavior normal. Behavior is cooperative.         Thought Content: Thought content normal.         Cognition and Memory: Cognition and memory normal.         Judgment: Judgment normal.       Laboratory:  Recent Labs     09/28/22  1440 10/01/22  1021   WBC 8.2 6.4   RBC 4.22 4.59    HEMOGLOBIN 11.2* 12.2   HEMATOCRIT 36.6* 40.1   MCV 86.7 87.4   MCH 26.5* 26.6*   MCHC 30.6* 30.4*   RDW 52.9* 53.4*   PLATELETCT 217 187   MPV 10.4 11.6     Recent Labs     09/28/22  1440 10/01/22  1021   SODIUM 142 144   POTASSIUM 3.7 4.8   CHLORIDE 107 110   CO2 26 26   GLUCOSE 114* 97   BUN 9 9   CREATININE 0.88 0.85   CALCIUM 8.9 9.1     Recent Labs     09/28/22  1440 10/01/22  1021   ALTSGPT 14 16   ASTSGOT 11* 23   ALKPHOSPHAT 104* 105*   TBILIRUBIN 0.6 0.5   GLUCOSE 114* 97         Recent Labs     09/28/22  1440 10/01/22  1021   NTPROBNP 168* 473*         Recent Labs     09/28/22  1440 10/01/22  1021   TROPONINT 28* 24*       Imaging:  DX-CHEST-PORTABLE (1 VIEW)   Final Result      1.  Mildly enlarged cardiac silhouette with changes of mild edema.      EC-ECHOCARDIOGRAM COMPLETE W/O CONT    (Results Pending)   NM-CARDIAC STRESS TEST    (Results Pending)       X-Ray:  I have personally reviewed the images and compared with prior images.  EKG:  I have personally reviewed the images and compared with prior images.    Assessment/Plan:  Justification for Admission Status  I anticipate this patient is appropriate for observation status at this time because patient has recurrent shortness of breath while on home oxygen use and is mildly more hypoxic than usual.  With testing and acute management patient should turn around in the next 23 hours    Patient will need a Telemetry bed on MEDICAL service .  The need is secondary to shortness of breath.    * Dyspnea- (present on admission)  Assessment & Plan  Dyspnea most likely related to combination of COPD exacerbation as well as heart failure    COPD (chronic obstructive pulmonary disease) (HCC)- (present on admission)  Assessment & Plan  Patient most likely has COPD exacerbation  Audible wheezing  RT protocol initiated  Steroids initiated  Currently no antibiotics patient's white blood cell count is normal at 6.4  Oxygen support, patient uses 3 L at home continue at  this point with oxygen titration to keep oxygen saturations above 90%    Cardiomegaly- (present on admission)  Assessment & Plan  Chronic diastolic heart failure versus acute systolic heart failure on top with  Echocardiogram ordered  Lasix ordered  Monitor intake and output  Monitor potassium levels    Elevated alkaline phosphatase level  Assessment & Plan  Mild elevation continue to monitor currently 105  May be related to bone process versus liver.    Schizophrenia (HCC)- (present on admission)  Assessment & Plan  Chronic schizophrenia identified currently not on medications for it.    Tobacco use- (present on admission)  Assessment & Plan  Tobacco cessation education provided  Patient smokes 1 pack/day since age 16.  Patient says she quit last week.    Morbid obesity (HCC)- (present on admission)  Assessment & Plan  Body mass index is 54.32 kg/m².  Outpatient weight loss management program highly recommended    KATHIA (obstructive sleep apnea)- (present on admission)  Assessment & Plan  Patient uses CPAP at home nocturnally  Continue with CPAP as needed    Bipolar Disorder- (present on admission)  Assessment & Plan  Chronic bipolar disorder without acute exacerbation  Currently not suicidal homicidal        VTE prophylaxis: SCDs/TEDs

## 2022-10-01 NOTE — CONSULTS
Pulmonary Consultation    Date of consult: 10/1/2022    Referring Physician  Devang Ugner M.D.    Reason for Consultation  COPD exacerbation    History of Presenting Illness  47 y.o. female who presented 10/1/2022 prior tobacco smoker 30 pack years recently quit with complaints of chest pain and shortness of breath, with increased work of breathing and wheezing.  Patient was seen in the emergency room yesterday for bilateral lower extremity swelling which was attributed to edema and was treated with Lasix.  Overnight she developed wheezing and worsening shortness of breath.  She has a history of asthma and COPD.  Also worsening supplemental oxygen requirements up to 2 to 3 L to maintain adequate saturations.  PE elevated at 473.  Troponin also slightly elevated 24.  SARS/flu/RSV swab negative.  No leukocytosis.  Procalcitonin WNL.    He was admitted to hospital service for undifferentiated hypoxia for possible decompensated CHF or COPD exacerbation.  Past medical history otherwise notable for atrial fibrillation, schizophrenia/bipolar disorder, and morbid obesity with KATHIA/OHS.  echo and stress test ordered by primary hospitalist    PFTs from 2021 showed NO obstruction, proportional reduction in the forced volumes with significant bronchodilator response in nearly all measured spirometric values.  Total lung capacity mildly reduced at 78%.  Preserved diffusion capacity.  Overall consistent with extrathoracic restriction with reported BMI 54.4.    No emphysema on CT angiogram of the chest from 9/2/2022.    Code Status  Full Code    Review of Systems  Review of Systems   Constitutional:  Positive for malaise/fatigue.   Respiratory:  Positive for cough, shortness of breath and wheezing.    Cardiovascular:  Positive for leg swelling.   All other systems reviewed and are negative.    Past Medical History   has a past medical history of A-fib (HCC), Asthma, Bipolar 2 disorder (HCC), Chickenpox, Hypertension, On home  oxygen therapy, Other psychological stress, Schizophrenic disorder (HCC), and Yeast infection of the skin (04/01/2021).    She has no past medical history of CAD (coronary artery disease), Cancer (HCC), Congestive heart failure (HCC), Diabetes, Liver disease, Renal disorder, Seizure disorder (HCC), or Stroke (HCC).    Surgical History   has a past surgical history that includes hysterectomy laparoscopy; colectomy; cholecystectomy; and knee arthroscopy (Left).    Family History  family history includes Cancer in her sister; No Known Problems in her mother.    Social History   reports that she has been smoking cigarettes. She has been smoking an average of .25 packs per day. She has never used smokeless tobacco. She reports that she does not drink alcohol and does not use drugs.    Medications  Home Medications       Reviewed by Robbie Sheikh (Pharmacy Tech) on 10/01/22 at 1314  Med List Status: Complete     Medication Last Dose Status   aripiprazole (ABILIFY) 30 MG tablet 9/30/2022 Active   atorvastatin (LIPITOR) 20 MG Tab 9/30/2022 Active   cyclobenzaprine (FLEXERIL) 5 mg tablet PRN Active   famotidine (PEPCID) 20 MG Tab 9/30/2022 Active   furosemide (LASIX) 40 MG Tab 9/30/2022 Active   ketorolac (TORADOL) 10 MG Tab PRN Active   methocarbamol (ROBAXIN) 750 MG Tab PRN Active   methylPREDNISolone (MEDROL) 4 MG Tab 9/30/2022 Active   montelukast (SINGULAIR) 10 MG Tab 9/30/2022 Active   nitroglycerin (NITROSTAT) 0.4 MG SL Tab PRN Active   nystatin (MYCOSTATIN) 758873 UNIT/GM Cream topical cream 9/30/2022 Active   oxyCODONE-acetaminophen (PERCOCET) 5-325 MG Tab PRN Active   traZODone (DESYREL) 50 MG Tab 9/30/2022 Active                  Current Facility-Administered Medications   Medication Dose Route Frequency Provider Last Rate Last Admin    ARIPiprazole (Abilify) tablet 30 mg  30 mg Oral QAM Devang Unger M.D.   30 mg at 10/01/22 1415    atorvastatin (LIPITOR) tablet 20 mg  20 mg Oral QHS Devang Unger M.D.         cyclobenzaprine (Flexeril) tablet 5-10 mg  5-10 mg Oral TID PRN Devang Unger M.D.        famotidine (PEPCID) tablet 20 mg  20 mg Oral BID Devang Unger M.D.   20 mg at 10/01/22 1830    methocarbamol (ROBAXIN) tablet 750 mg  750 mg Oral 4XDAY Devang Unger M.D.   750 mg at 10/01/22 1830    nitroglycerin (NITROSTAT) tablet 0.4 mg  0.4 mg Sublingual PRN Devang Unger M.D.        nystatin (MYCOSTATIN) cream 100,000 Units  1 Application Topical BID Devang Unger M.D.        traZODone (DESYREL) tablet 50 mg  50 mg Oral QHS Devang Ungre M.D.        oxyCODONE-acetaminophen (PERCOCET) 5-325 MG per tablet 1 Tablet  1 Tablet Oral Q4HRS PRN Devang Unger M.D.        senna-docusate (PERICOLACE or SENOKOT S) 8.6-50 MG per tablet 2 Tablet  2 Tablet Oral BID Devang Unger M.D.        And    polyethylene glycol/lytes (MIRALAX) PACKET 1 Packet  1 Packet Oral QDAY PRN Devang Unger M.D.        And    magnesium hydroxide (MILK OF MAGNESIA) suspension 30 mL  30 mL Oral QDAY PRN Devang Unger M.D.        And    bisacodyl (DULCOLAX) suppository 10 mg  10 mg Rectal QDAY PRN Devang Unger M.D.        Respiratory Therapy Consult   Nebulization Continuous RT Devang Unger M.D.        acetaminophen (Tylenol) tablet 650 mg  650 mg Oral Q6HRS PRN Devang Unger M.D.   650 mg at 10/01/22 1829    labetalol (NORMODYNE/TRANDATE) injection 10 mg  10 mg Intravenous Q4HRS PRN Devang Unger M.D.        ondansetron (ZOFRAN) syringe/vial injection 4 mg  4 mg Intravenous Q4HRS PRN Devang Unger M.D.        ondansetron (ZOFRAN ODT) dispertab 4 mg  4 mg Oral Q4HRS PRN Devang Unger M.D.        promethazine (PHENERGAN) tablet 12.5-25 mg  12.5-25 mg Oral Q4HRS PRN Devang Unger M.D.        promethazine (PHENERGAN) suppository 12.5-25 mg  12.5-25 mg Rectal Q4HRS PRN Devang Unger M.D.        prochlorperazine (COMPAZINE) injection 5-10 mg  5-10 mg Intravenous Q4HRS PRN Devang Unger M.D.        Respiratory Therapy Consult   Nebulization  Continuous RT Devang Unger M.D.        ipratropium-albuterol (DUONEB) nebulizer solution  3 mL Nebulization Q4HRS (RT) Devang Unger M.D.   3 mL at 10/01/22 1425    ipratropium-albuterol (DUONEB) nebulizer solution  3 mL Nebulization Q2HRS PRN (RT) Devang Unger M.D.        [START ON 10/2/2022] predniSONE (DELTASONE) tablet 40 mg  40 mg Oral DAILY Devang Unger M.D.        furosemide (LASIX) injection 60 mg  60 mg Intravenous BID DIURETIC Devang Unger M.D.   60 mg at 10/01/22 1415       Allergies  Allergies   Allergen Reactions    Penicillin G Rash and Itching     pt reports that she gets a rash all over her body and gets itchy    Amoxicillin Rash and Itching     Tolerates cephalosporins, pt reports that she gets a rash all over her body and gets itchy       Vital Signs last 24 hours  Temp:  [36.2 °C (97.1 °F)-36.7 °C (98.1 °F)] 36.2 °C (97.2 °F)  Pulse:  [72-81] 72  Resp:  [15-24] 20  BP: (107-147)/(53-72) 125/58  SpO2:  [88 %-100 %] 96 %    Physical Exam  Physical Exam  Vitals and nursing note reviewed.   Constitutional:       General: She is not in acute distress.     Appearance: Normal appearance. She is well-developed. She is obese. She is ill-appearing. She is not diaphoretic.   Eyes:      General: No scleral icterus.        Right eye: No discharge.         Left eye: No discharge.      Conjunctiva/sclera: Conjunctivae normal.      Pupils: Pupils are equal, round, and reactive to light.   Neck:      Thyroid: No thyromegaly.      Vascular: No JVD.   Cardiovascular:      Rate and Rhythm: Normal rate and regular rhythm.      Heart sounds: Normal heart sounds. No murmur heard.    No gallop.   Pulmonary:      Effort: Pulmonary effort is normal. No respiratory distress.      Breath sounds: Normal breath sounds. No wheezing or rales.   Abdominal:      General: There is no distension.      Palpations: Abdomen is soft.      Tenderness: There is no abdominal tenderness. There is no guarding.   Musculoskeletal:          General: No tenderness.   Lymphadenopathy:      Cervical: No cervical adenopathy.   Skin:     General: Skin is warm.      Capillary Refill: Capillary refill takes less than 2 seconds.      Findings: No erythema or rash.   Neurological:      Mental Status: She is alert and oriented to person, place, and time.      Cranial Nerves: No cranial nerve deficit.      Sensory: No sensory deficit.      Motor: No abnormal muscle tone.   Psychiatric:         Behavior: Behavior normal.     Fluids    Intake/Output Summary (Last 24 hours) at 10/1/2022 1957  Last data filed at 10/1/2022 1700  Gross per 24 hour   Intake --   Output 1200 ml   Net -1200 ml       Laboratory  Recent Results (from the past 48 hour(s))   EKG    Collection Time: 10/01/22 10:03 AM   Result Value Ref Range    Report       [facility] Emergency Dept.    Test Date:  2022-10-01  Pt Name:    ADELINA MILLAN                Department: ER  MRN:        [id number]                      Room:       [facility]  Gender:     Female                       Technician: [id number]  :        1975                   Requested By:ER TRIAGE PROTOCOL  Order #:    [id number]                    Reading MD:    Measurements  Intervals                                Axis  Rate:       76                           P:  OH:                                      QRS:        -3  QRSD:       103                          T:          51  QT:         356  QTc:        401    Interpretive Statements  Atrial fibrillation  Low voltage, precordial leads  Borderline ST depression, anterior leads  Compared to ECG 2022 13:27:54  Low QRS voltage now present  ST (T wave) deviation now present  Sinus rhythm no longer present     CBC with Differential    Collection Time: 10/01/22 10:21 AM   Result Value Ref Range    WBC 6.4 4.8 - 10.8 K/uL    RBC 4.59 4.20 - 5.40 M/uL    Hemoglobin 12.2 12.0 - 16.0 g/dL    Hematocrit 40.1 37.0 - 47.0 %    MCV 87.4 81.4 - 97.8 fL    MCH 26.6 (L) 27.0 -  33.0 pg    MCHC 30.4 (L) 33.6 - 35.0 g/dL    RDW 53.4 (H) 35.9 - 50.0 fL    Platelet Count 187 164 - 446 K/uL    MPV 11.6 9.0 - 12.9 fL    Neutrophils-Polys 76.60 (H) 44.00 - 72.00 %    Lymphocytes 14.50 (L) 22.00 - 41.00 %    Monocytes 7.00 0.00 - 13.40 %    Eosinophils 1.20 0.00 - 6.90 %    Basophils 0.20 0.00 - 1.80 %    Immature Granulocytes 0.50 0.00 - 0.90 %    Nucleated RBC 0.00 /100 WBC    Neutrophils (Absolute) 4.92 2.00 - 7.15 K/uL    Lymphs (Absolute) 0.93 (L) 1.00 - 4.80 K/uL    Monos (Absolute) 0.45 0.00 - 0.85 K/uL    Eos (Absolute) 0.08 0.00 - 0.51 K/uL    Baso (Absolute) 0.01 0.00 - 0.12 K/uL    Immature Granulocytes (abs) 0.03 0.00 - 0.11 K/uL    NRBC (Absolute) 0.00 K/uL   Complete Metabolic Panel (CMP)    Collection Time: 10/01/22 10:21 AM   Result Value Ref Range    Sodium 144 135 - 145 mmol/L    Potassium 4.8 3.6 - 5.5 mmol/L    Chloride 110 96 - 112 mmol/L    Co2 26 20 - 33 mmol/L    Anion Gap 8.0 7.0 - 16.0    Glucose 97 65 - 99 mg/dL    Bun 9 8 - 22 mg/dL    Creatinine 0.85 0.50 - 1.40 mg/dL    Calcium 9.1 8.5 - 10.5 mg/dL    AST(SGOT) 23 12 - 45 U/L    ALT(SGPT) 16 2 - 50 U/L    Alkaline Phosphatase 105 (H) 30 - 99 U/L    Total Bilirubin 0.5 0.1 - 1.5 mg/dL    Albumin 3.5 3.2 - 4.9 g/dL    Total Protein 6.6 6.0 - 8.2 g/dL    Globulin 3.1 1.9 - 3.5 g/dL    A-G Ratio 1.1 g/dL   Troponin    Collection Time: 10/01/22 10:21 AM   Result Value Ref Range    Troponin T 24 (H) 6 - 19 ng/L   proBrain Natriuretic Peptide, NT    Collection Time: 10/01/22 10:21 AM   Result Value Ref Range    NT-proBNP 473 (H) 0 - 125 pg/mL   PROCALCITONIN    Collection Time: 10/01/22 10:21 AM   Result Value Ref Range    Procalcitonin 0.06 <0.25 ng/mL   ESTIMATED GFR    Collection Time: 10/01/22 10:21 AM   Result Value Ref Range    GFR (CKD-EPI) 84 >60 mL/min/1.73 m 2   CoV-2, FLU A/B, and RSV by PCR (2-4 Hours CEPHEID) : Collect NP swab in VTM    Collection Time: 10/01/22 11:00 AM    Specimen: Nasopharyngeal; Respirate    Result Value Ref Range    Influenza virus A RNA Negative Negative    Influenza virus B, PCR Negative Negative    RSV, PCR Negative Negative    SARS-CoV-2 by PCR NotDetected     SARS-CoV-2 Source NP Swab    EKG (NOW)    Collection Time: 10/01/22 12:22 PM   Result Value Ref Range    Report       [facility] Emergency Dept.    Test Date:  2022-10-01  Pt Name:    ADELINA MILLAN                Department: ER  MRN:        [id number]                      Room:        06  Gender:     Female                       Technician: TCM  :        1975                   Requested By:ER TRIAGE PROTOCOL  Order #:    [id number]                    Reading MD: BIANKA JEFFERS MD    Measurements  Intervals                                Axis  Rate:       76                           P:          77  NY:         137                          QRS:        80  QRSD:       104                          T:          3  QT:         372  QTc:        419    Interpretive Statements  Sinus rhythm  Low voltage, precordial leads  RSR' in V1 or V2, probably normal variant  Compared to ECG 10/01/2022 10:03:33  RSR' in V1 or V2 now present  Atrial fibrillation no longer present  ST (T wave) deviation no longer present  Electronically Signed On 10-1-2022 12:40:42 PDT by BIANKA JEFFERS MD       Imaging  EC-ECHOCARDIOGRAM COMPLETE W/ CONT         DX-CHEST-PORTABLE (1 VIEW)   Final Result      1.  Mildly enlarged cardiac silhouette with changes of mild edema.      NM-CARDIAC STRESS TEST    (Results Pending)     PFTs from  showed NO obstruction, proportional reduction in the forced volumes with significant bronchodilator response in nearly all measured spirometric values.  Total lung capacity mildly reduced at 78%.  Preserved diffusion capacity.  Overall consistent with extrathoracic restriction with reported BMI 54.4.    No emphysema on CT angiogram of the chest from 2022.    Assessment/Plan    Asthma exacerbation- (present on  admission)  Assessment & Plan  Agree with primary hospitalist that current issues are NOT suggestive of COPD exacerbation. Chart audited and COPD removed from problem list.  -- PFTs 2021: NO obstruction, rather mild restriction with preserved DLCO consistent with obesity. There was a significant bronchodilator response in nearly all measured spirometric values suggestive of bronchial hyperreactivity/asthma  -- Complete 5d prednisone  -- duonebs prn  -- Start ICS/LABA as outpatient    Morbid obesity with alveolar hypoventilation (HCC)- (present on admission)  Assessment & Plan  Body mass index is 52.54 kg/m².  Patient counseled about the importance of weight loss for long-term health risk reduction.   Cont CPAP QHS    Acute hypoxemic respiratory failure (HCC)  Assessment & Plan  Acute on chronic  Titrate O2 to maintain sats low 90s  Encourage OOB, IS    Restrictive lung disease secondary to obesity  Assessment & Plan  Plan of care as outlined above.    Tobacco use- (present on admission)  Assessment & Plan  Former tob use, quit 2 weeks ago  Cont cessation    Discussed patient condition and risk of morbidity and/or mortality with RT and Patient.      We will sign off. Please do not hesitate to contact us if we can be of any further assistance. I am most easily reached via [employer] secure messaging application.    This note was generated using voice recognition software which has a chance of producing errors of grammar and content.  I have made every reasonable attempt to find and correct any errors, but it should be expected that some may not be found prior to finalization of this note.  __________  Federico Sullivan MD  Pulmonary and Critical Care Medicine  [facility]

## 2022-10-01 NOTE — ED NOTES
"Chief Complaint   Patient presents with   • Low Back Pain     Pt bib remsa from home. C/o lower back pain after \"reached in to get something\", then sat down and started having lower back pain. Ambulatory using walker. Denies trauma/fall. Pt ran out of pain meidcation   • Leg Swelling     In both lower extremities x years. \"Ran out of lasix x 2 months\"     Denies bowel/bladder incontinence. Also noted bilateral lower extremity edema x years. States also ran out of her furosemide x 2 months. Was seen here 2 days ago for same.          "

## 2022-10-01 NOTE — ASSESSMENT & PLAN NOTE
Patient most likely has COPD exacerbation  Audible wheezing  RT protocol initiated  Steroids initiated  Currently no antibiotics patient's white blood cell count is normal at 6.4  Oxygen support, patient uses 3 L at home continue at this point with oxygen titration to keep oxygen saturations above 90%

## 2022-10-01 NOTE — ED PROVIDER NOTES
ED Provider Note    CHIEF COMPLAINT  Chief Complaint   Patient presents with    Chest Pain     Beginning last night, mid sternal. Not worsening with inspiration or expiration    Shortness of Breath     Increased work of breathing and wheezing        HPI  Lorene Haro is a 47 y.o. female who presents for evaluation of cough and wheezing and worsening shortness of breath.  The patient has underlying history of asthma and COPD.  She was seen here yesterday with similar complaints and had a negative D-dimer and a chest x-ray without any evidence of any overt volume overload or pneumonia.  She was discharged home reports worsening symptoms.  She is on oxygen at home but had to turn it up to 2 to 3 L.  She has not been taking any breathing treatments at home.  She reports bilateral leg redness and swelling which has been chronic but slightly worse over the past few days.  No report of any high fever night sweats or weight loss.    REVIEW OF SYSTEMS  See HPI for further details.  No night sweats weight loss numbness tingling weakness rash all other systems are negative.     PAST MEDICAL HISTORY  Past Medical History:   Diagnosis Date    Yeast infection of the skin 04/01/2021    A-fib ([facility])     Asthma     Bipolar 2 disorder ([facility])     Chickenpox     Hypertension     On home oxygen therapy     Other psychological stress     Schizophrenic disorder ([facility])        FAMILY HISTORY  Noncontributory  SOCIAL HISTORY  Social History     Socioeconomic History    Marital status: Single   Tobacco Use    Smoking status: Every Day     Packs/day: 0.25     Types: Cigarettes    Smokeless tobacco: Never   Vaping Use    Vaping Use: Never used   Substance and Sexual Activity    Alcohol use: Never    Drug use: Never    Sexual activity: Not Currently   Social History Narrative    ** Merged History Encounter **         From [place]     Social Determinants of Health     Financial Resource Strain: Medium Risk    Difficulty of Paying Living  Expenses: Somewhat hard   Food Insecurity: Food Insecurity Present    Worried About Running Out of Food in the Last Year: Often true    Ran Out of Food in the Last Year: Often true   Transportation Needs: No Transportation Needs    Lack of Transportation (Medical): No    Lack of Transportation (Non-Medical): No     Former smoker  SURGICAL HISTORY  Past Surgical History:   Procedure Laterality Date    CHOLECYSTECTOMY      COLECTOMY      HYSTERECTOMY LAPAROSCOPY      KNEE ARTHROSCOPY Left        CURRENT MEDICATIONS  Home Medications    **Home medications have not yet been reviewed for this encounter**     No current facility-administered medications for this encounter.    Current Outpatient Medications:     furosemide (LASIX) 40 MG Tab, Take 0.5 Tablets by mouth every day for 30 days., Disp: 15 Tablet, Rfl: 0    cyclobenzaprine (FLEXERIL) 5 mg tablet, Take 1-2 Tablets by mouth 3 times a day as needed for Mild Pain for up to 10 days., Disp: 30 Tablet, Rfl: 0    methylPREDNISolone (MEDROL) 4 MG Tab, Take as per the package instructions., Disp: 21 Tablet, Rfl: 0    oxyCODONE-acetaminophen (PERCOCET) 5-325 MG Tab, Take 1 Tablet by mouth every four hours as needed for Moderate Pain (pain) for up to 5 days., Disp: 12 Tablet, Rfl: 0    nystatin (MYCOSTATIN) 627565 UNIT/GM Cream topical cream, Apply 1 g topically 2 times a day., Disp: 30 g, Rfl: 0    ketorolac (TORADOL) 10 MG Tab, Take 1 Tablet by mouth every four hours as needed for Moderate Pain. With food, Disp: 15 Tablet, Rfl: 0    methocarbamol (ROBAXIN) 750 MG Tab, Take 1 Tablet by mouth 4 times a day. Take as needed for muscle spasm/strain., Disp: 40 Tablet, Rfl: 0    traZODone (DESYREL) 50 MG Tab, Take 1 Tablet by mouth at bedtime., Disp: 30 Tablet, Rfl: 3    famotidine (PEPCID) 20 MG Tab, Take 1 Tablet by mouth 2 times a day., Disp: 60 Tablet, Rfl: 0    atorvastatin (LIPITOR) 20 MG Tab, Take 20 mg by mouth at bedtime., Disp: , Rfl:     nitroglycerin (NITROSTAT) 0.4  "MG SL Tab, Place 1 tablet under the tongue as needed for Chest Pain., Disp: 25 tablet, Rfl: 0    aripiprazole (ABILIFY) 30 MG tablet, Take 30 mg by mouth every morning., Disp: , Rfl:       ALLERGIES  Allergies   Allergen Reactions    Penicillin G Rash and Itching     pt reports that she gets a rash all over her body and gets itchy    Amoxicillin Rash and Itching     Tolerates cephalosporins, pt reports that she gets a rash all over her body and gets itchy       PHYSICAL EXAM  VITAL SIGNS: /65   Pulse 79   Temp 36.7 °C (98.1 °F) (Temporal)   Resp 20   Ht 1.575 m (5' 2\")   Wt (!) 135 kg (297 lb)   LMP  (LMP Unknown)   SpO2 99%   BMI 54.32 kg/m²       Constitutional: Patient appears unwell  HENT: Normocephalic, Atraumatic, Bilateral external ears normal, Oropharynx moist, No oral exudates, Nose normal.   Eyes: PERRLA, EOMI, Conjunctiva normal, No discharge.   Neck: Normal range of motion, No tenderness, Supple, No stridor.   Cardiovascular: Normal heart rate, Normal rhythm, No murmurs, No rubs, No gallops.   Thorax & Lungs: N bilateral expiratory wheezes with retractions ormal breath sounds, No respiratory distress, No wheezing, No chest tenderness.   Abdomen: Bowel sounds normal, Soft, No tenderness, No masses, No pulsatile masses.   Skin: Warm, Dry, No erythema, No rash.   Back: No tenderness, No CVA tenderness.   Extremities: bilateral lower extremity edema with erythema and warmth 2+ pitting edema no lymphangitis neurologic: Alert & oriented x 3, Normal motor function, Normal sensory function, No focal deficits noted.   Psychiatric: Affect normal, Judgment normal, Mood normal.         RADIOLOGY/PROCEDURES  DX-CHEST-PORTABLE (1 VIEW)   Final Result      1.  Mildly enlarged cardiac silhouette with changes of mild edema.        Results for orders placed or performed during the hospital encounter of 10/01/22   CBC with Differential   Result Value Ref Range    WBC 6.4 4.8 - 10.8 K/uL    RBC 4.59 4.20 - " 5.40 M/uL    Hemoglobin 12.2 12.0 - 16.0 g/dL    Hematocrit 40.1 37.0 - 47.0 %    MCV 87.4 81.4 - 97.8 fL    MCH 26.6 (L) 27.0 - 33.0 pg    MCHC 30.4 (L) 33.6 - 35.0 g/dL    RDW 53.4 (H) 35.9 - 50.0 fL    Platelet Count 187 164 - 446 K/uL    MPV 11.6 9.0 - 12.9 fL    Neutrophils-Polys 76.60 (H) 44.00 - 72.00 %    Lymphocytes 14.50 (L) 22.00 - 41.00 %    Monocytes 7.00 0.00 - 13.40 %    Eosinophils 1.20 0.00 - 6.90 %    Basophils 0.20 0.00 - 1.80 %    Immature Granulocytes 0.50 0.00 - 0.90 %    Nucleated RBC 0.00 /100 WBC    Neutrophils (Absolute) 4.92 2.00 - 7.15 K/uL    Lymphs (Absolute) 0.93 (L) 1.00 - 4.80 K/uL    Monos (Absolute) 0.45 0.00 - 0.85 K/uL    Eos (Absolute) 0.08 0.00 - 0.51 K/uL    Baso (Absolute) 0.01 0.00 - 0.12 K/uL    Immature Granulocytes (abs) 0.03 0.00 - 0.11 K/uL    NRBC (Absolute) 0.00 K/uL   CoV-2, FLU A/B, and RSV by PCR (2-4 Hours CEPHEID) : Collect NP swab in VTM    Specimen: Nasopharyngeal; Respirate   Result Value Ref Range    SARS-CoV-2 Source NP Swab    EKG   Result Value Ref Range    Report       [facility]    Test Date:  2022-10-01  Pt Name:    ADELINA MILLAN                Department: ER  MRN:        [id number]                      Room:       RD 06  Gender:     Female                       Technician: [id number]  :        1975                   Requested By:ER TRIAGE PROTOCOL  Order #:    [id number]                    Reading MD:    Measurements  Intervals                                Axis  Rate:       76                           P:  MA:                                      QRS:        -3  QRSD:       103                          T:          51  QT:         356  QTc:        401    Interpretive Statements  Atrial fibrillation  Low voltage, precordial leads  Borderline ST depression, anterior leads  Compared to ECG 2022 13:27:54  Low QRS voltage now present  ST (T wave) deviation now present  Sinus rhythm no longer present            COURSE & MEDICAL DECISION MAKING  Pertinent Labs & Imaging studies reviewed. (See chart for details)  Patient presents here for cough wheezing and shortness of breath.  She has worsening hypoxia from her baseline.  I reviewed her records.  She did have a slightly elevated BNP yesterday and it is trending up and chest x-ray does demonstrate some moderate edema but clinically her presentation is more consistent with COPD exacerbation.  She was given first dose of IV steroids as well as a DuoNeb breathing treatment.  Troponin is slightly elevated but EKG does not suggest ischemia.  She has no leukocytosis or fever and procalcitonin is not elevated suggesting against serious bacterial infection.  For this reason blood cultures were not performed.  She had a D-dimer yesterday which was negative I did not feel that repeating this test is clinically indicated.  Her history of present illness and work-up is strongly suggestive of asthma/COPD exacerbation with hypoxia.  She will be admitted for further treatment    FINAL IMPRESSION  1.  Acute COPD exacerbation with hypoxia      Electronically signed by: Juan Day M.D., 10/1/2022 11:02 AM

## 2022-10-02 ENCOUNTER — PHARMACY VISIT (OUTPATIENT)
Dept: PHARMACY | Facility: MEDICAL CENTER | Age: 47
End: 2022-10-02
Payer: COMMERCIAL

## 2022-10-02 VITALS
HEIGHT: 62 IN | TEMPERATURE: 97.9 F | RESPIRATION RATE: 18 BRPM | BODY MASS INDEX: 52.86 KG/M2 | OXYGEN SATURATION: 94 % | DIASTOLIC BLOOD PRESSURE: 67 MMHG | WEIGHT: 287.26 LBS | HEART RATE: 68 BPM | SYSTOLIC BLOOD PRESSURE: 130 MMHG

## 2022-10-02 PROBLEM — R06.00 DYSPNEA: Status: RESOLVED | Noted: 2022-10-01 | Resolved: 2022-10-02

## 2022-10-02 PROBLEM — J96.01 ACUTE HYPOXEMIC RESPIRATORY FAILURE (HCC): Status: RESOLVED | Noted: 2018-11-13 | Resolved: 2022-10-02

## 2022-10-02 LAB
ANION GAP SERPL CALC-SCNC: 7 MMOL/L (ref 7–16)
BUN SERPL-MCNC: 9 MG/DL (ref 8–22)
CALCIUM SERPL-MCNC: 9 MG/DL (ref 8.5–10.5)
CHLORIDE SERPL-SCNC: 105 MMOL/L (ref 96–112)
CO2 SERPL-SCNC: 30 MMOL/L (ref 20–33)
CREAT SERPL-MCNC: 0.78 MG/DL (ref 0.5–1.4)
ERYTHROCYTE [DISTWIDTH] IN BLOOD BY AUTOMATED COUNT: 52.6 FL (ref 35.9–50)
GFR SERPLBLD CREATININE-BSD FMLA CKD-EPI: 93 ML/MIN/1.73 M 2
GLUCOSE SERPL-MCNC: 127 MG/DL (ref 65–99)
HCT VFR BLD AUTO: 37.4 % (ref 37–47)
HGB BLD-MCNC: 11.3 G/DL (ref 12–16)
LV EJECT FRACT  99904: 65
LV EJECT FRACT MOD 2C 99903: 78.58
LV EJECT FRACT MOD 4C 99902: 65.94
LV EJECT FRACT MOD BP 99901: 71.49
MCH RBC QN AUTO: 26.3 PG (ref 27–33)
MCHC RBC AUTO-ENTMCNC: 30.2 G/DL (ref 33.6–35)
MCV RBC AUTO: 87.2 FL (ref 81.4–97.8)
PLATELET # BLD AUTO: 244 K/UL (ref 164–446)
PMV BLD AUTO: 11 FL (ref 9–12.9)
POTASSIUM SERPL-SCNC: 4 MMOL/L (ref 3.6–5.5)
RBC # BLD AUTO: 4.29 M/UL (ref 4.2–5.4)
SODIUM SERPL-SCNC: 142 MMOL/L (ref 135–145)
TROPONIN T SERPL-MCNC: 17 NG/L (ref 6–19)
WBC # BLD AUTO: 4.8 K/UL (ref 4.8–10.8)

## 2022-10-02 PROCEDURE — 84484 ASSAY OF TROPONIN QUANT: CPT

## 2022-10-02 PROCEDURE — 96376 TX/PRO/DX INJ SAME DRUG ADON: CPT

## 2022-10-02 PROCEDURE — 85027 COMPLETE CBC AUTOMATED: CPT

## 2022-10-02 PROCEDURE — 80048 BASIC METABOLIC PNL TOTAL CA: CPT

## 2022-10-02 PROCEDURE — 36415 COLL VENOUS BLD VENIPUNCTURE: CPT

## 2022-10-02 PROCEDURE — 700101 HCHG RX REV CODE 250: Performed by: HOSPITALIST

## 2022-10-02 PROCEDURE — 700101 HCHG RX REV CODE 250: Performed by: INTERNAL MEDICINE

## 2022-10-02 PROCEDURE — 99217 PR OBSERVATION CARE DISCHARGE: CPT | Performed by: INTERNAL MEDICINE

## 2022-10-02 PROCEDURE — 700111 HCHG RX REV CODE 636 W/ 250 OVERRIDE (IP): Performed by: HOSPITALIST

## 2022-10-02 PROCEDURE — 700102 HCHG RX REV CODE 250 W/ 637 OVERRIDE(OP): Performed by: HOSPITALIST

## 2022-10-02 PROCEDURE — G0378 HOSPITAL OBSERVATION PER HR: HCPCS

## 2022-10-02 PROCEDURE — A9270 NON-COVERED ITEM OR SERVICE: HCPCS | Performed by: HOSPITALIST

## 2022-10-02 PROCEDURE — 94669 MECHANICAL CHEST WALL OSCILL: CPT

## 2022-10-02 PROCEDURE — 93306 TTE W/DOPPLER COMPLETE: CPT | Mod: 26 | Performed by: INTERNAL MEDICINE

## 2022-10-02 PROCEDURE — 94640 AIRWAY INHALATION TREATMENT: CPT

## 2022-10-02 PROCEDURE — RXMED WILLOW AMBULATORY MEDICATION CHARGE: Performed by: INTERNAL MEDICINE

## 2022-10-02 RX ORDER — PREDNISONE 20 MG/1
40 TABLET ORAL DAILY
Qty: 8 TABLET | Refills: 0 | Status: ON HOLD | OUTPATIENT
Start: 2022-10-02 | End: 2022-10-07

## 2022-10-02 RX ORDER — IPRATROPIUM BROMIDE AND ALBUTEROL SULFATE 2.5; .5 MG/3ML; MG/3ML
3 SOLUTION RESPIRATORY (INHALATION) 4 TIMES DAILY
Status: DISCONTINUED | OUTPATIENT
Start: 2022-10-02 | End: 2022-10-02 | Stop reason: HOSPADM

## 2022-10-02 RX ORDER — ALBUTEROL SULFATE 90 UG/1
2 AEROSOL, METERED RESPIRATORY (INHALATION) EVERY 6 HOURS PRN
Qty: 8.5 G | Refills: 1 | Status: SHIPPED | OUTPATIENT
Start: 2022-10-02 | End: 2022-10-02 | Stop reason: SDUPTHER

## 2022-10-02 RX ORDER — PREDNISONE 20 MG/1
40 TABLET ORAL DAILY
Qty: 8 TABLET | Refills: 0 | Status: SHIPPED | OUTPATIENT
Start: 2022-10-02 | End: 2022-10-02 | Stop reason: SDUPTHER

## 2022-10-02 RX ORDER — ALBUTEROL SULFATE 90 UG/1
2 AEROSOL, METERED RESPIRATORY (INHALATION) EVERY 6 HOURS PRN
Qty: 18 G | Refills: 1 | Status: SHIPPED | OUTPATIENT
Start: 2022-10-02 | End: 2022-11-13 | Stop reason: SDUPTHER

## 2022-10-02 RX ORDER — FLUTICASONE PROPIONATE AND SALMETEROL 100; 50 UG/1; UG/1
1 POWDER RESPIRATORY (INHALATION) EVERY 12 HOURS
Qty: 60 EACH | Refills: 1 | Status: SHIPPED | OUTPATIENT
Start: 2022-10-02 | End: 2022-11-13 | Stop reason: SDUPTHER

## 2022-10-02 RX ORDER — FLUTICASONE PROPIONATE AND SALMETEROL 100; 50 UG/1; UG/1
1 POWDER RESPIRATORY (INHALATION) EVERY 12 HOURS
Qty: 1 EACH | Refills: 1 | Status: SHIPPED | OUTPATIENT
Start: 2022-10-02 | End: 2022-10-02 | Stop reason: SDUPTHER

## 2022-10-02 RX ORDER — FUROSEMIDE 40 MG/1
40 TABLET ORAL DAILY
Qty: 30 TABLET | Refills: 0 | Status: SHIPPED | OUTPATIENT
Start: 2022-10-02 | End: 2022-10-02 | Stop reason: SDUPTHER

## 2022-10-02 RX ORDER — FUROSEMIDE 40 MG/1
40 TABLET ORAL DAILY
Qty: 30 TABLET | Refills: 0 | Status: SHIPPED | OUTPATIENT
Start: 2022-10-02 | End: 2022-11-01

## 2022-10-02 RX ORDER — POTASSIUM CHLORIDE 750 MG/1
10 TABLET, EXTENDED RELEASE ORAL DAILY
Qty: 30 EACH | Refills: 0 | Status: ON HOLD | OUTPATIENT
Start: 2022-10-02 | End: 2022-10-26

## 2022-10-02 RX ORDER — FLUTICASONE PROPIONATE AND SALMETEROL 100; 50 UG/1; UG/1
1 POWDER RESPIRATORY (INHALATION) EVERY 12 HOURS
Qty: 60 EACH | Refills: 1 | Status: SHIPPED | OUTPATIENT
Start: 2022-10-02 | End: 2022-10-02 | Stop reason: SDUPTHER

## 2022-10-02 RX ORDER — POTASSIUM CHLORIDE 750 MG/1
10 TABLET, EXTENDED RELEASE ORAL DAILY
Qty: 30 EACH | Refills: 0 | Status: SHIPPED | OUTPATIENT
Start: 2022-10-02 | End: 2022-10-02 | Stop reason: SDUPTHER

## 2022-10-02 RX ADMIN — FUROSEMIDE 60 MG: 10 INJECTION, SOLUTION INTRAMUSCULAR; INTRAVENOUS at 16:09

## 2022-10-02 RX ADMIN — IPRATROPIUM BROMIDE AND ALBUTEROL SULFATE 3 ML: .5; 2.5 SOLUTION RESPIRATORY (INHALATION) at 08:04

## 2022-10-02 RX ADMIN — PREDNISONE 40 MG: 20 TABLET ORAL at 05:14

## 2022-10-02 RX ADMIN — NYSTATIN 100000 UNITS: 100000 CREAM TOPICAL at 02:00

## 2022-10-02 RX ADMIN — ARIPIPRAZOLE 30 MG: 15 TABLET ORAL at 05:14

## 2022-10-02 RX ADMIN — NYSTATIN 100000 UNITS: 100000 CREAM TOPICAL at 16:09

## 2022-10-02 RX ADMIN — FAMOTIDINE 20 MG: 20 TABLET, FILM COATED ORAL at 05:14

## 2022-10-02 RX ADMIN — IPRATROPIUM BROMIDE AND ALBUTEROL SULFATE 3 ML: .5; 2.5 SOLUTION RESPIRATORY (INHALATION) at 15:41

## 2022-10-02 RX ADMIN — FUROSEMIDE 60 MG: 10 INJECTION, SOLUTION INTRAMUSCULAR; INTRAVENOUS at 05:14

## 2022-10-02 RX ADMIN — METHOCARBAMOL 750 MG: 750 TABLET ORAL at 09:26

## 2022-10-02 RX ADMIN — METHOCARBAMOL 750 MG: 750 TABLET ORAL at 16:09

## 2022-10-02 RX ADMIN — DOCUSATE SODIUM 50 MG AND SENNOSIDES 8.6 MG 2 TABLET: 8.6; 5 TABLET, FILM COATED ORAL at 05:13

## 2022-10-02 RX ADMIN — IPRATROPIUM BROMIDE AND ALBUTEROL SULFATE 3 ML: .5; 2.5 SOLUTION RESPIRATORY (INHALATION) at 03:14

## 2022-10-02 RX ADMIN — ACETAMINOPHEN 650 MG: 325 TABLET, FILM COATED ORAL at 09:26

## 2022-10-02 ASSESSMENT — PAIN DESCRIPTION - PAIN TYPE: TYPE: ACUTE PAIN

## 2022-10-02 NOTE — CARE PLAN
The patient is Stable - Low risk of patient condition declining or worsening    Shift Goals  Clinical Goals: admit to CDU, monitor I/Os, pending stress test, pending echo  Patient Goals: rest, eat    Progress made toward(s) clinical / shift goals:  Patient admitted to CDU with no complications. Purewick in place for I/Os. Echo completed, but still pending stress test.    Patient is not progressing towards the following goals:

## 2022-10-02 NOTE — DISCHARGE SUMMARY
Discharge Summary    CHIEF COMPLAINT ON ADMISSION  Chief Complaint   Patient presents with    Chest Pain     Beginning last night, mid sternal. Not worsening with inspiration or expiration    Shortness of Breath     Increased work of breathing and wheezing        Reason for Admission  Sent by EMS     Admission Date  10/1/2022    CODE STATUS  Full Code    HPI & HOSPITAL COURSE  This is a 47 y.o. female with a past medical history of asthma, obesity, diastolic congestive heart failure, chronic respiratory failure on 3 L of oxygen, bipolar disorder who presented with shortness of breath and wheezing.  Chest x-ray revealed cardiomegaly and pulmonary edema.  On exam she had diffuse wheezing and coarse breath sounds.  She was started on treatment for asthma exacerbation and CHF exacerbation with steroids, scheduled bronchodilators and IV Lasix.  Her symptoms improved the next day and she was saturating well on 2 to 3 L of oxygen.  She underwent a 2D echo that revealed LVEF 65% she will be discharged home with close outpatient follow-up and has been instructed to return to the ER for worsening symptoms    Therefore, she is discharged in good and stable condition to home with close outpatient follow-up.    The patient recovered much more quickly than anticipated on admission.    Discharge Date  10/2/2022    FOLLOW UP ITEMS POST DISCHARGE  Follow-up with PCP    DISCHARGE DIAGNOSES  Principal Problem (Resolved):    Dyspnea POA: Yes  Active Problems:    Bipolar Disorder POA: Yes    Cardiomegaly POA: Yes    KATHIA (obstructive sleep apnea) POA: Yes    Morbid obesity with alveolar hypoventilation (HCC) POA: Yes    Tobacco use POA: Yes    Schizophrenia (HCC) POA: Yes    HLD (hyperlipidemia) POA: Yes    Asthma exacerbation POA: Yes    Elevated alkaline phosphatase level POA: Unknown    Restrictive lung disease secondary to obesity POA: Unknown  Resolved Problems:    Acute hypoxemic respiratory failure (HCC) POA: Yes      FOLLOW  UP  Future Appointments   Date Time Provider Department Center   11/1/2022  1:40 PM Nader Barraza M.D. [facility] None   11/16/2022  2:00 PM CRISTOBAL Azul [facility] None   11/17/2022  1:00 PM Oz Borrego M.D. [facility] None     No follow-up provider specified.    MEDICATIONS ON DISCHARGE     Medication List        START taking these medications        Instructions   albuterol 108 (90 Base) MCG/ACT Aers inhalation aerosol   Inhale 2 Puffs every 6 hours as needed for Shortness of Breath.  Dose: 2 Puff     fluticasone-salmeterol 100-50 MCG/ACT Aepb  Commonly known as: Advair   Inhale 1 Puff every 12 hours.  Dose: 1 Puff     predniSONE 20 MG Tabs  Commonly known as: DELTASONE   Take 2 Tablets by mouth every day for 4 days.  Dose: 40 mg            CONTINUE taking these medications        Instructions   aripiprazole 30 MG tablet  Commonly known as: ABILIFY   Take 30 mg by mouth every morning.  Dose: 30 mg     atorvastatin 20 MG Tabs  Commonly known as: LIPITOR   Take 20 mg by mouth at bedtime.  Dose: 20 mg     cyclobenzaprine 5 mg tablet  Commonly known as: Flexeril   Take 1-2 Tablets by mouth 3 times a day as needed for Mild Pain for up to 10 days.  Dose: 5-10 mg     famotidine 20 MG Tabs  Commonly known as: PEPCID   Take 1 Tablet by mouth 2 times a day.  Dose: 20 mg     furosemide 40 MG Tabs  Commonly known as: LASIX   Take 0.5 Tablets by mouth every day for 30 days.  Dose: 20 mg     ketorolac 10 MG Tabs  Commonly known as: TORADOL   Take 1 Tablet by mouth every four hours as needed for Moderate Pain. With food  Dose: 10 mg     methocarbamol 750 MG Tabs  Commonly known as: ROBAXIN   Take 1 Tablet by mouth 4 times a day. Take as needed for muscle spasm/strain.  Dose: 750 mg     montelukast 10 MG Tabs  Commonly known as: SINGULAIR   Take 10 mg by mouth every morning.  Dose: 10 mg     nitroglycerin 0.4 MG Subl  Commonly known as: NITROSTAT   Place 1 tablet under the tongue as needed for Chest Pain.  Dose: 0.4 mg      nystatin [id number] UNIT/GM Crea topical cream  Commonly known as: MYCOSTATIN   Apply 1 g topically 2 times a day.  Dose: 1 Application     traZODone 50 MG Tabs  Commonly known as: DESYREL   Take 1 Tablet by mouth at bedtime.  Dose: 50 mg            STOP taking these medications      methylPREDNISolone 4 MG Tabs  Commonly known as: Medrol     oxyCODONE-acetaminophen 5-325 MG Tabs  Commonly known as: Percocet              Allergies  Allergies   Allergen Reactions    Penicillin G Rash and Itching     pt reports that she gets a rash all over her body and gets itchy    Amoxicillin Rash and Itching     Tolerates cephalosporins, pt reports that she gets a rash all over her body and gets itchy       DIET  Orders Placed This Encounter   Procedures    Diet Order Diet: Regular     Standing Status:   Standing     Number of Occurrences:   1     Order Specific Question:   Diet:     Answer:   Regular [1]       ACTIVITY  As tolerated.  Weight bearing as tolerated    CONSULTATIONS  Pulmonary     PROCEDURES  none    LABORATORY  Lab Results   Component Value Date    SODIUM 142 10/02/2022    POTASSIUM 4.0 10/02/2022    CHLORIDE 105 10/02/2022    CO2 30 10/02/2022    GLUCOSE 127 (H) 10/02/2022    BUN 9 10/02/2022    CREATININE 0.78 10/02/2022    CREATININE 0.9 09/11/2008        Lab Results   Component Value Date    WBC 4.8 10/02/2022    HEMOGLOBIN 11.3 (L) 10/02/2022    HEMATOCRIT 37.4 10/02/2022    PLATELETCT 244 10/02/2022        Total time of the discharge process exceeds 35 minutes.

## 2022-10-02 NOTE — PROGRESS NOTES
Assumed care of patient this AM. Patient is A&O x4 and on 2 L NC. Minimal c/o pain in legs. Patient is in no distress at this time. Patient is resting with bed in lowest position, wheels locked, and call light within reach.

## 2022-10-02 NOTE — PROGRESS NOTES
Discharge orders in place. Prescription medications sent to [facility] - patient stated she is unable to  prescriptions. Hospitalist aware and changed prescriptions to meds to beds.     Patient does not have portable oxygen tank with her. SW notified and in process of having portable tank delivered to bedside. ETA 1600. Patient states she also does not have a ride home. SW also notified.    Patient got frustrated about waiting for portable oxygen tank and says she only lives 45 minutes away and that she will be fine until she gets home. I explained to the patient that she is on 2-3 L NC and that she will become hypoxic without oxygen. Patient agreeable to wait for oxygen.

## 2022-10-02 NOTE — CARE PLAN
The patient is Stable - Low risk of patient condition declining or worsening    Shift Goals  Clinical Goals: safety, pain control  Patient Goals: breathing treatments, pain control    Progress made toward(s) clinical / shift goals:    Problem: Knowledge Deficit - Standard  Goal: Patient and family/care givers will demonstrate understanding of plan of care, disease process/condition, diagnostic tests and medications  Outcome: Progressing     Problem: Knowledge Deficit - COPD  Goal: Patient/significant other demonstrates understanding of disease process, utilization of the Action Plan, medications and discharge instruction  Outcome: Progressing     Problem: Risk for Infection - COPD  Goal: Patient will remain free from signs and symptoms of infection  Outcome: Progressing     Problem: Nutrition - Advanced  Goal: Patient will display progressive weight gain toward goal have adequate food and fluid intake  Outcome: Progressing     Problem: Ineffective Airway Clearance  Goal: Patient will maintain patent airway with clear/clearing breath sounds  Outcome: Progressing     Problem: Impaired Gas Exchange  Goal: Patient will demonstrate improved ventilation and adequate oxygenation and participate in treatment regimen within the level of ability/situation.  Outcome: Progressing     Problem: Risk for Aspiration  Goal: Patient's risk for aspiration will be absent or decrease  Outcome: Progressing     Problem: Self Care  Goal: Patient will have the ability to perform ADLs independently or with assistance (bathe, groom, dress, toilet and feed)  Outcome: Progressing     Problem: Pain - Standard  Goal: Alleviation of pain or a reduction in pain to the patient’s comfort goal  Outcome: Progressing     Problem: Skin Integrity  Goal: Skin integrity is maintained or improved  Outcome: Progressing       Patient is not progressing towards the following goals:

## 2022-10-02 NOTE — ASSESSMENT & PLAN NOTE
Body mass index is 52.54 kg/m².  Patient counseled about the importance of weight loss for long-term health risk reduction.   Cont CPAP QHS

## 2022-10-02 NOTE — DISCHARGE PLANNING
Hospital Care Management- Medical Social Work      LMSW called [employer] requesting a portable tank for pt to return home. Awaiting return call.    Update: home O2 tank will be delivered to bedside around 4pm. SW will assisted with MTM ride at that time

## 2022-10-02 NOTE — PROGRESS NOTES
4 Eyes Skin Assessment Completed by JC Reddy and JC Hope.    Head WDL  Ears WDL  Nose WDL  Mouth WDL  Neck WDL  Breast/Chest WDL  Shoulder Blades WDL  Spine WDL  (R) Arm/Elbow/Hand WDL  (L) Arm/Elbow/Hand WDL  Abdomen WDL  Groin WDL  Scrotum/Coccyx/Buttocks WDL  (R) Leg Redness  (L) Leg Redness  (R) Heel/Foot/Toe WDL  (L) Heel/Foot/Toe WDL          Devices In Places Tele Box, Blood Pressure Cuff, and Pulse Ox      Interventions In Place Pillows and Q2 Turns    Possible Skin Injury No    Pictures Uploaded Into Epic N/A  Wound Consult Placed N/A  RN Wound Prevention Protocol Ordered No

## 2022-10-02 NOTE — ASSESSMENT & PLAN NOTE
Agree with primary hospitalist that current issues are NOT suggestive of COPD exacerbation. Chart audited and COPD removed from problem list.  -- PFTs 2021: NO obstruction, rather mild restriction with preserved DLCO consistent with obesity. There was a significant bronchodilator response in nearly all measured spirometric values suggestive of bronchial hyperreactivity/asthma  -- Complete 5d prednisone  -- duonebs prn  -- Start ICS/LABA as outpatient

## 2022-10-03 ENCOUNTER — PATIENT OUTREACH (OUTPATIENT)
Dept: SCHEDULING | Facility: IMAGING CENTER | Age: 47
End: 2022-10-03
Payer: MEDICAID

## 2022-10-03 LAB
BACTERIA BLD CULT: NORMAL
SIGNIFICANT IND 70042: NORMAL
SITE SITE: NORMAL
SOURCE SOURCE: NORMAL

## 2022-10-03 NOTE — RESPIRATORY CARE
"COPD EDUCATION by COPD CLINICAL EDUCATOR  10/2/2022  at  5:04 PM by Albania Gonsalez, RRT     Patient interviewed by COPD education team.  Patient unable to participate in full program.  A short intervention has been conducted.  A comprehensive packet including information about COPD, types of treatments to manage their disease and safe home Oxygen usage was provided and reviewed with patient at the bedside.      COPD Screen  COPD Risk Screening  Do you have a history of COPD?: Yes  Do you have a Pulmonologist?: Yes    COPD Assessment  COPD Clinical Specialists ONLY  COPD Education Initiated: Yes--Short Intervention  DME Company: [employer]  DME Equipment Type: 02  Physician Name: DB KELLY M.D.  Pulmonologist Name: Hood Jernigan  Referrals Initiated: Yes  Pulmonary Rehab: Yes (ordered on admit)  Smoking Cessation: Declined  Palliative Care:  (not ordered, seen on last admit)  Hospice: N/A  Home Health Care:  (TBD, [facility]  established)  [facility] Outreach: N/A  [facility] Group: N/A  Private In-Home Care Agency: N/A  Is this a COPD exacerbation patient?: No (Per IP pulmonary 10/01/2022)  (OP) Pulmonary Function Testing: Yes (09/2021 FEV1 58 Ratio 56 Pulmonary function testing shows mixed restrictive obstructive defect with significant bronchodilator response.  This could be consistent with COPD in a patient with morbid obesity.)    PFT Results    No results found for: PFT    Meds to Beds  Would the patient like to opt in for Bedside Medication Delivery at Discharge?: Yes, interested     MY COPD ACTION PLAN     It is recommended that patients and physicians /healthcare providers complete this action plan together. This plan should be discussed at each physician visit and updated as needed.    The green, yellow and red zones show groups of symptoms of COPD. This list of symptoms is not comprehensive, and you may experience other symptoms. In the \"Actions\" column, your healthcare provider has " "recommended actions for you to take based on your symptoms.    Patient Name: Lorene Haro   YOB: 1975   Last Updated on: 10/2/2022  5:03 PM   Green Zone:  I am doing well today Actions     Usual activitiy and exercise level   Take daily medications     Usual amounts of cough and phlegm/mucus   Use oxygen as prescribed     Sleep well at night   Continue regular exercise/diet plan     Appetite is good   At all times avoid cigarette smoke, inhaled irritants     Daily Medications (these medications are taken every day):   Fluticosone/Salmeterol (Advair) 1 Puff Twice daily     Additional Information:  Rinse mouth and spit after each treatment     Yellow Zone:  I am having a bad day or a COPD flare Actions     More breathless than usual   Continue daily medications     I have less energy for my daily activities   Use quick relief inhaler as ordered     Increased or thicker phlegm/mucus   Use oxygen as prescribed     Using quick relief inhaler/nebulizer more often   Get plenty of rest     Swelling of ankles more than usual   Use pursed lip breathing     More coughing than usual   At all times avoid cigarette smoke, inhaled irritants     I feel like I have a \"chest cold\"     Poor sleep and my symptoms woke me up     My appetite is not good     My medicine is not helping      Call provider immediately if symptoms don’t improve     Continue daily medications, add rescue medications:   Albuterol 2 Puffs Every 6 hours PRN       Medications to be used during a flare up, (as Discussed with Provider):           Additional Information:  Use spacer with each treatment     Red Zone:  I need urgent medical care Actions     Severe shortness of breath even at rest   Call 911 or seek medical care immediately     Not able to do any activity because of breathing      Fever or shaking chills      Feeling confused or very drowsy       Chest pains      Coughing up blood                  "

## 2022-10-03 NOTE — PROGRESS NOTES
Discharge instructions discussed with patient regarding hospitalization, medications/prescriptions, and follow up appts. Patient verbalized understanding and has no questions at this time. IV removed. Meds to beds delivered. Oxygen tank delivered. Patient left in no distress.

## 2022-10-04 ENCOUNTER — TELEPHONE (OUTPATIENT)
Dept: HEALTH INFORMATION MANAGEMENT | Facility: OTHER | Age: 47
End: 2022-10-04

## 2022-10-04 ENCOUNTER — PATIENT OUTREACH (OUTPATIENT)
Dept: HEALTH INFORMATION MANAGEMENT | Facility: OTHER | Age: 47
End: 2022-10-04
Payer: MEDICAID

## 2022-10-04 ENCOUNTER — HOSPITAL ENCOUNTER (INPATIENT)
Facility: MEDICAL CENTER | Age: 47
LOS: 3 days | DRG: 193 | End: 2022-10-07
Attending: EMERGENCY MEDICINE | Admitting: STUDENT IN AN ORGANIZED HEALTH CARE EDUCATION/TRAINING PROGRAM
Payer: MEDICAID

## 2022-10-04 ENCOUNTER — APPOINTMENT (OUTPATIENT)
Dept: RADIOLOGY | Facility: MEDICAL CENTER | Age: 47
DRG: 193 | End: 2022-10-04
Attending: EMERGENCY MEDICINE
Payer: MEDICAID

## 2022-10-04 DIAGNOSIS — J18.9 PNEUMONIA DUE TO INFECTIOUS ORGANISM, UNSPECIFIED LATERALITY, UNSPECIFIED PART OF LUNG: ICD-10-CM

## 2022-10-04 DIAGNOSIS — J44.1 ACUTE EXACERBATION OF CHRONIC OBSTRUCTIVE PULMONARY DISEASE (COPD) (HCC): ICD-10-CM

## 2022-10-04 DIAGNOSIS — J18.9 PNEUMONIA OF LEFT LOWER LOBE DUE TO INFECTIOUS ORGANISM: Primary | ICD-10-CM

## 2022-10-04 DIAGNOSIS — R09.02 HYPOXIA: ICD-10-CM

## 2022-10-04 DIAGNOSIS — K21.9 GASTROESOPHAGEAL REFLUX DISEASE, UNSPECIFIED WHETHER ESOPHAGITIS PRESENT: ICD-10-CM

## 2022-10-04 DIAGNOSIS — J44.1 COPD WITH ACUTE EXACERBATION (HCC): ICD-10-CM

## 2022-10-04 DIAGNOSIS — M54.32 SCIATICA OF LEFT SIDE: ICD-10-CM

## 2022-10-04 DIAGNOSIS — J45.41 MODERATE PERSISTENT ASTHMA WITH EXACERBATION: ICD-10-CM

## 2022-10-04 DIAGNOSIS — I50.32 CHRONIC DIASTOLIC HEART FAILURE (HCC): ICD-10-CM

## 2022-10-04 DIAGNOSIS — R06.89 HYPOVENTILATION SYNDROME: ICD-10-CM

## 2022-10-04 LAB
ANION GAP SERPL CALC-SCNC: 14 MMOL/L (ref 7–16)
BASOPHILS # BLD AUTO: 0.1 % (ref 0–1.8)
BASOPHILS # BLD: 0.01 K/UL (ref 0–0.12)
BUN SERPL-MCNC: 21 MG/DL (ref 8–22)
CALCIUM SERPL-MCNC: 10.1 MG/DL (ref 8.5–10.5)
CHLORIDE SERPL-SCNC: 97 MMOL/L (ref 96–112)
CO2 SERPL-SCNC: 30 MMOL/L (ref 20–33)
CREAT SERPL-MCNC: 1.03 MG/DL (ref 0.5–1.4)
EKG IMPRESSION: NORMAL
EOSINOPHIL # BLD AUTO: 0 K/UL (ref 0–0.51)
EOSINOPHIL NFR BLD: 0 % (ref 0–6.9)
ERYTHROCYTE [DISTWIDTH] IN BLOOD BY AUTOMATED COUNT: 51.3 FL (ref 35.9–50)
GFR SERPLBLD CREATININE-BSD FMLA CKD-EPI: 67 ML/MIN/1.73 M 2
GLUCOSE SERPL-MCNC: 117 MG/DL (ref 65–99)
HCT VFR BLD AUTO: 41.3 % (ref 37–47)
HGB BLD-MCNC: 12.9 G/DL (ref 12–16)
IMM GRANULOCYTES # BLD AUTO: 0.04 K/UL (ref 0–0.11)
IMM GRANULOCYTES NFR BLD AUTO: 0.4 % (ref 0–0.9)
LACTATE SERPL-SCNC: 2.6 MMOL/L (ref 0.5–2)
LACTATE SERPL-SCNC: 3.6 MMOL/L (ref 0.5–2)
LYMPHOCYTES # BLD AUTO: 1.67 K/UL (ref 1–4.8)
LYMPHOCYTES NFR BLD: 17.2 % (ref 22–41)
MCH RBC QN AUTO: 26.4 PG (ref 27–33)
MCHC RBC AUTO-ENTMCNC: 31.2 G/DL (ref 33.6–35)
MCV RBC AUTO: 84.5 FL (ref 81.4–97.8)
MONOCYTES # BLD AUTO: 0.89 K/UL (ref 0–0.85)
MONOCYTES NFR BLD AUTO: 9.2 % (ref 0–13.4)
NEUTROPHILS # BLD AUTO: 7.09 K/UL (ref 2–7.15)
NEUTROPHILS NFR BLD: 73.1 % (ref 44–72)
NRBC # BLD AUTO: 0 K/UL
NRBC BLD-RTO: 0 /100 WBC
NT-PROBNP SERPL IA-MCNC: 276 PG/ML (ref 0–125)
PLATELET # BLD AUTO: 288 K/UL (ref 164–446)
PMV BLD AUTO: 11.3 FL (ref 9–12.9)
POTASSIUM SERPL-SCNC: 3.8 MMOL/L (ref 3.6–5.5)
RBC # BLD AUTO: 4.89 M/UL (ref 4.2–5.4)
SODIUM SERPL-SCNC: 141 MMOL/L (ref 135–145)
TROPONIN T SERPL-MCNC: 20 NG/L (ref 6–19)
WBC # BLD AUTO: 9.7 K/UL (ref 4.8–10.8)

## 2022-10-04 PROCEDURE — 87641 MR-STAPH DNA AMP PROBE: CPT

## 2022-10-04 PROCEDURE — 71045 X-RAY EXAM CHEST 1 VIEW: CPT

## 2022-10-04 PROCEDURE — 700111 HCHG RX REV CODE 636 W/ 250 OVERRIDE (IP): Performed by: EMERGENCY MEDICINE

## 2022-10-04 PROCEDURE — 700105 HCHG RX REV CODE 258: Performed by: STUDENT IN AN ORGANIZED HEALTH CARE EDUCATION/TRAINING PROGRAM

## 2022-10-04 PROCEDURE — A9270 NON-COVERED ITEM OR SERVICE: HCPCS | Performed by: STUDENT IN AN ORGANIZED HEALTH CARE EDUCATION/TRAINING PROGRAM

## 2022-10-04 PROCEDURE — 83880 ASSAY OF NATRIURETIC PEPTIDE: CPT

## 2022-10-04 PROCEDURE — 83605 ASSAY OF LACTIC ACID: CPT

## 2022-10-04 PROCEDURE — A9270 NON-COVERED ITEM OR SERVICE: HCPCS | Performed by: EMERGENCY MEDICINE

## 2022-10-04 PROCEDURE — 93005 ELECTROCARDIOGRAM TRACING: CPT | Performed by: EMERGENCY MEDICINE

## 2022-10-04 PROCEDURE — 700101 HCHG RX REV CODE 250: Performed by: EMERGENCY MEDICINE

## 2022-10-04 PROCEDURE — 94640 AIRWAY INHALATION TREATMENT: CPT

## 2022-10-04 PROCEDURE — 96372 THER/PROPH/DIAG INJ SC/IM: CPT

## 2022-10-04 PROCEDURE — 700102 HCHG RX REV CODE 250 W/ 637 OVERRIDE(OP): Performed by: EMERGENCY MEDICINE

## 2022-10-04 PROCEDURE — 700102 HCHG RX REV CODE 250 W/ 637 OVERRIDE(OP): Performed by: STUDENT IN AN ORGANIZED HEALTH CARE EDUCATION/TRAINING PROGRAM

## 2022-10-04 PROCEDURE — 700105 HCHG RX REV CODE 258: Performed by: EMERGENCY MEDICINE

## 2022-10-04 PROCEDURE — 96365 THER/PROPH/DIAG IV INF INIT: CPT

## 2022-10-04 PROCEDURE — 84484 ASSAY OF TROPONIN QUANT: CPT

## 2022-10-04 PROCEDURE — 770001 HCHG ROOM/CARE - MED/SURG/GYN PRIV*

## 2022-10-04 PROCEDURE — 85025 COMPLETE CBC W/AUTO DIFF WBC: CPT

## 2022-10-04 PROCEDURE — 87040 BLOOD CULTURE FOR BACTERIA: CPT

## 2022-10-04 PROCEDURE — 96375 TX/PRO/DX INJ NEW DRUG ADDON: CPT

## 2022-10-04 PROCEDURE — 80048 BASIC METABOLIC PNL TOTAL CA: CPT

## 2022-10-04 PROCEDURE — 99223 1ST HOSP IP/OBS HIGH 75: CPT | Performed by: STUDENT IN AN ORGANIZED HEALTH CARE EDUCATION/TRAINING PROGRAM

## 2022-10-04 PROCEDURE — C9803 HOPD COVID-19 SPEC COLLECT: HCPCS | Performed by: EMERGENCY MEDICINE

## 2022-10-04 PROCEDURE — 99285 EMERGENCY DEPT VISIT HI MDM: CPT

## 2022-10-04 PROCEDURE — 36415 COLL VENOUS BLD VENIPUNCTURE: CPT

## 2022-10-04 PROCEDURE — 700111 HCHG RX REV CODE 636 W/ 250 OVERRIDE (IP): Performed by: STUDENT IN AN ORGANIZED HEALTH CARE EDUCATION/TRAINING PROGRAM

## 2022-10-04 RX ORDER — METHOCARBAMOL 750 MG/1
750 TABLET, FILM COATED ORAL 4 TIMES DAILY
Status: DISCONTINUED | OUTPATIENT
Start: 2022-10-04 | End: 2022-10-05

## 2022-10-04 RX ORDER — ARIPIPRAZOLE 10 MG/1
30 TABLET ORAL EVERY MORNING
Status: DISCONTINUED | OUTPATIENT
Start: 2022-10-05 | End: 2022-10-07 | Stop reason: HOSPADM

## 2022-10-04 RX ORDER — ACETAMINOPHEN 325 MG/1
650 TABLET ORAL EVERY 6 HOURS PRN
Status: DISCONTINUED | OUTPATIENT
Start: 2022-10-04 | End: 2022-10-07 | Stop reason: HOSPADM

## 2022-10-04 RX ORDER — FUROSEMIDE 10 MG/ML
20 INJECTION INTRAMUSCULAR; INTRAVENOUS
Status: DISCONTINUED | OUTPATIENT
Start: 2022-10-05 | End: 2022-10-04

## 2022-10-04 RX ORDER — PREDNISONE 20 MG/1
60 TABLET ORAL ONCE
Status: COMPLETED | OUTPATIENT
Start: 2022-10-04 | End: 2022-10-04

## 2022-10-04 RX ORDER — CYCLOBENZAPRINE HCL 10 MG
5-10 TABLET ORAL 3 TIMES DAILY PRN
Status: DISCONTINUED | OUTPATIENT
Start: 2022-10-04 | End: 2022-10-07 | Stop reason: HOSPADM

## 2022-10-04 RX ORDER — FUROSEMIDE 10 MG/ML
60 INJECTION INTRAMUSCULAR; INTRAVENOUS
Status: DISCONTINUED | OUTPATIENT
Start: 2022-10-05 | End: 2022-10-04

## 2022-10-04 RX ORDER — OXYCODONE HYDROCHLORIDE AND ACETAMINOPHEN 5; 325 MG/1; MG/1
1 TABLET ORAL EVERY 4 HOURS PRN
Status: DISCONTINUED | OUTPATIENT
Start: 2022-10-04 | End: 2022-10-07 | Stop reason: HOSPADM

## 2022-10-04 RX ORDER — NYSTATIN 100000 U/G
1 CREAM TOPICAL 2 TIMES DAILY
Status: COMPLETED | OUTPATIENT
Start: 2022-10-04 | End: 2022-10-06

## 2022-10-04 RX ORDER — NYSTATIN 100000 U/G
1 CREAM TOPICAL 2 TIMES DAILY
Status: ON HOLD | COMMUNITY
End: 2022-10-26

## 2022-10-04 RX ORDER — FAMOTIDINE 20 MG/1
20 TABLET, FILM COATED ORAL 2 TIMES DAILY
Status: DISCONTINUED | OUTPATIENT
Start: 2022-10-04 | End: 2022-10-07 | Stop reason: HOSPADM

## 2022-10-04 RX ORDER — IPRATROPIUM BROMIDE AND ALBUTEROL SULFATE 2.5; .5 MG/3ML; MG/3ML
3 SOLUTION RESPIRATORY (INHALATION)
Status: DISCONTINUED | OUTPATIENT
Start: 2022-10-04 | End: 2022-10-06

## 2022-10-04 RX ORDER — IPRATROPIUM BROMIDE AND ALBUTEROL SULFATE 2.5; .5 MG/3ML; MG/3ML
3 SOLUTION RESPIRATORY (INHALATION)
Status: DISCONTINUED | OUTPATIENT
Start: 2022-10-05 | End: 2022-10-05

## 2022-10-04 RX ORDER — IPRATROPIUM BROMIDE AND ALBUTEROL SULFATE 2.5; .5 MG/3ML; MG/3ML
3 SOLUTION RESPIRATORY (INHALATION)
Status: DISCONTINUED | OUTPATIENT
Start: 2022-10-04 | End: 2022-10-04

## 2022-10-04 RX ORDER — ENOXAPARIN SODIUM 100 MG/ML
40 INJECTION SUBCUTANEOUS DAILY
Status: DISCONTINUED | OUTPATIENT
Start: 2022-10-04 | End: 2022-10-07 | Stop reason: HOSPADM

## 2022-10-04 RX ORDER — ATORVASTATIN CALCIUM 20 MG/1
20 TABLET, FILM COATED ORAL
Status: DISCONTINUED | OUTPATIENT
Start: 2022-10-04 | End: 2022-10-07 | Stop reason: HOSPADM

## 2022-10-04 RX ORDER — LEVOFLOXACIN 750 MG/1
750 TABLET, FILM COATED ORAL ONCE
Status: DISCONTINUED | OUTPATIENT
Start: 2022-10-04 | End: 2022-10-04

## 2022-10-04 RX ORDER — PREDNISONE 20 MG/1
40 TABLET ORAL DAILY
Status: DISCONTINUED | OUTPATIENT
Start: 2022-10-05 | End: 2022-10-07 | Stop reason: HOSPADM

## 2022-10-04 RX ORDER — BENZONATATE 100 MG/1
100 CAPSULE ORAL 3 TIMES DAILY PRN
Status: DISCONTINUED | OUTPATIENT
Start: 2022-10-04 | End: 2022-10-07 | Stop reason: HOSPADM

## 2022-10-04 RX ORDER — IPRATROPIUM BROMIDE AND ALBUTEROL SULFATE 2.5; .5 MG/3ML; MG/3ML
3 SOLUTION RESPIRATORY (INHALATION) 4 TIMES DAILY
Status: DISCONTINUED | OUTPATIENT
Start: 2022-10-04 | End: 2022-10-04

## 2022-10-04 RX ORDER — SODIUM CHLORIDE 9 MG/ML
1500 INJECTION, SOLUTION INTRAVENOUS ONCE
Status: COMPLETED | OUTPATIENT
Start: 2022-10-04 | End: 2022-10-05

## 2022-10-04 RX ADMIN — VANCOMYCIN HYDROCHLORIDE 3 G: 500 INJECTION, POWDER, LYOPHILIZED, FOR SOLUTION INTRAVENOUS at 23:51

## 2022-10-04 RX ADMIN — ATORVASTATIN CALCIUM 20 MG: 20 TABLET, FILM COATED ORAL at 23:51

## 2022-10-04 RX ADMIN — FAMOTIDINE 20 MG: 20 TABLET, FILM COATED ORAL at 23:51

## 2022-10-04 RX ADMIN — SODIUM CHLORIDE 1500 ML: 9 INJECTION, SOLUTION INTRAVENOUS at 23:45

## 2022-10-04 RX ADMIN — CEFEPIME 2 G: 2 INJECTION, POWDER, FOR SOLUTION INTRAVENOUS at 22:24

## 2022-10-04 RX ADMIN — BENZONATATE 100 MG: 100 CAPSULE ORAL at 22:24

## 2022-10-04 RX ADMIN — ENOXAPARIN SODIUM 40 MG: 40 INJECTION SUBCUTANEOUS at 23:51

## 2022-10-04 RX ADMIN — PREDNISONE 60 MG: 20 TABLET ORAL at 21:05

## 2022-10-04 RX ADMIN — IPRATROPIUM BROMIDE AND ALBUTEROL SULFATE 3 ML: .5; 2.5 SOLUTION RESPIRATORY (INHALATION) at 21:14

## 2022-10-04 RX ADMIN — METHOCARBAMOL 750 MG: 750 TABLET ORAL at 23:51

## 2022-10-04 ASSESSMENT — FIBROSIS 4 INDEX: FIB4 SCORE: 1.15

## 2022-10-04 NOTE — PROGRESS NOTES
CHW followed up with the patient. Pt states that she is doing well.   CHW advised the patient that an [facility] has been scheduled to pick her up and take her to her cardiology appointment on 11/1/2022. UBER will pick her up around 1pm.     Pt understood and has no other questions or concerns. CHW will remove the patient from [facility] caseload.

## 2022-10-05 PROBLEM — J44.1 COPD WITH ACUTE EXACERBATION (HCC): Status: RESOLVED | Noted: 2022-10-04 | Resolved: 2022-10-05

## 2022-10-05 PROBLEM — J18.9 PNEUMONIA OF LEFT LOWER LOBE DUE TO INFECTIOUS ORGANISM: Status: ACTIVE | Noted: 2022-10-05

## 2022-10-05 LAB
BASE EXCESS BLDA CALC-SCNC: 2 MMOL/L (ref -4–3)
BODY TEMPERATURE: 36.3 CENTIGRADE
HCO3 BLDA-SCNC: 27 MMOL/L (ref 17–25)
LACTATE SERPL-SCNC: 2 MMOL/L (ref 0.5–2)
LACTATE SERPL-SCNC: 2.3 MMOL/L (ref 0.5–2)
LACTATE SERPL-SCNC: 2.4 MMOL/L (ref 0.5–2)
PCO2 BLDA: 43.5 MMHG (ref 26–37)
PH BLDA: 7.41 [PH] (ref 7.4–7.5)
PO2 BLDA: 172.1 MMHG (ref 64–87)
SAO2 % BLDA: 99 % (ref 93–99)
SCCMEC + MECA PNL NOSE NAA+PROBE: NEGATIVE

## 2022-10-05 PROCEDURE — 700111 HCHG RX REV CODE 636 W/ 250 OVERRIDE (IP): Performed by: STUDENT IN AN ORGANIZED HEALTH CARE EDUCATION/TRAINING PROGRAM

## 2022-10-05 PROCEDURE — 700102 HCHG RX REV CODE 250 W/ 637 OVERRIDE(OP): Performed by: EMERGENCY MEDICINE

## 2022-10-05 PROCEDURE — 82803 BLOOD GASES ANY COMBINATION: CPT

## 2022-10-05 PROCEDURE — 94640 AIRWAY INHALATION TREATMENT: CPT

## 2022-10-05 PROCEDURE — A9270 NON-COVERED ITEM OR SERVICE: HCPCS | Performed by: GENERAL PRACTICE

## 2022-10-05 PROCEDURE — 83605 ASSAY OF LACTIC ACID: CPT

## 2022-10-05 PROCEDURE — 700101 HCHG RX REV CODE 250: Performed by: STUDENT IN AN ORGANIZED HEALTH CARE EDUCATION/TRAINING PROGRAM

## 2022-10-05 PROCEDURE — 700102 HCHG RX REV CODE 250 W/ 637 OVERRIDE(OP): Performed by: STUDENT IN AN ORGANIZED HEALTH CARE EDUCATION/TRAINING PROGRAM

## 2022-10-05 PROCEDURE — 96367 TX/PROPH/DG ADDL SEQ IV INF: CPT

## 2022-10-05 PROCEDURE — 770001 HCHG ROOM/CARE - MED/SURG/GYN PRIV*

## 2022-10-05 PROCEDURE — 700102 HCHG RX REV CODE 250 W/ 637 OVERRIDE(OP): Performed by: GENERAL PRACTICE

## 2022-10-05 PROCEDURE — A9270 NON-COVERED ITEM OR SERVICE: HCPCS | Performed by: STUDENT IN AN ORGANIZED HEALTH CARE EDUCATION/TRAINING PROGRAM

## 2022-10-05 PROCEDURE — 96372 THER/PROPH/DIAG INJ SC/IM: CPT

## 2022-10-05 PROCEDURE — A9270 NON-COVERED ITEM OR SERVICE: HCPCS | Performed by: EMERGENCY MEDICINE

## 2022-10-05 PROCEDURE — 94660 CPAP INITIATION&MGMT: CPT

## 2022-10-05 PROCEDURE — 99233 SBSQ HOSP IP/OBS HIGH 50: CPT | Performed by: GENERAL PRACTICE

## 2022-10-05 PROCEDURE — 700105 HCHG RX REV CODE 258: Performed by: STUDENT IN AN ORGANIZED HEALTH CARE EDUCATION/TRAINING PROGRAM

## 2022-10-05 PROCEDURE — 36415 COLL VENOUS BLD VENIPUNCTURE: CPT

## 2022-10-05 PROCEDURE — 96366 THER/PROPH/DIAG IV INF ADDON: CPT

## 2022-10-05 RX ORDER — GUAIFENESIN/DEXTROMETHORPHAN 100-10MG/5
5 SYRUP ORAL EVERY 6 HOURS PRN
Status: DISCONTINUED | OUTPATIENT
Start: 2022-10-05 | End: 2022-10-07 | Stop reason: HOSPADM

## 2022-10-05 RX ORDER — MONTELUKAST SODIUM 10 MG/1
10 TABLET ORAL EVERY MORNING
Status: DISCONTINUED | OUTPATIENT
Start: 2022-10-05 | End: 2022-10-07 | Stop reason: HOSPADM

## 2022-10-05 RX ORDER — TRAZODONE HYDROCHLORIDE 100 MG/1
50 TABLET ORAL
Status: DISCONTINUED | OUTPATIENT
Start: 2022-10-05 | End: 2022-10-07 | Stop reason: HOSPADM

## 2022-10-05 RX ORDER — BUDESONIDE AND FORMOTEROL FUMARATE DIHYDRATE 80; 4.5 UG/1; UG/1
2 AEROSOL RESPIRATORY (INHALATION)
Status: DISCONTINUED | OUTPATIENT
Start: 2022-10-05 | End: 2022-10-07 | Stop reason: HOSPADM

## 2022-10-05 RX ORDER — IPRATROPIUM BROMIDE AND ALBUTEROL SULFATE 2.5; .5 MG/3ML; MG/3ML
3 SOLUTION RESPIRATORY (INHALATION)
Status: DISCONTINUED | OUTPATIENT
Start: 2022-10-05 | End: 2022-10-06 | Stop reason: ALTCHOICE

## 2022-10-05 RX ORDER — METHOCARBAMOL 750 MG/1
750 TABLET, FILM COATED ORAL 4 TIMES DAILY PRN
Status: DISCONTINUED | OUTPATIENT
Start: 2022-10-05 | End: 2022-10-07 | Stop reason: HOSPADM

## 2022-10-05 RX ORDER — FUROSEMIDE 40 MG/1
40 TABLET ORAL DAILY
Status: DISCONTINUED | OUTPATIENT
Start: 2022-10-05 | End: 2022-10-07 | Stop reason: HOSPADM

## 2022-10-05 RX ORDER — FLUTICASONE PROPIONATE AND SALMETEROL 100; 50 UG/1; UG/1
1 POWDER RESPIRATORY (INHALATION) EVERY 12 HOURS
Status: DISCONTINUED | OUTPATIENT
Start: 2022-10-05 | End: 2022-10-05

## 2022-10-05 RX ADMIN — CEFEPIME 2 G: 2 INJECTION, POWDER, FOR SOLUTION INTRAVENOUS at 22:58

## 2022-10-05 RX ADMIN — OXYCODONE AND ACETAMINOPHEN 1 TABLET: 5; 325 TABLET ORAL at 03:16

## 2022-10-05 RX ADMIN — NYSTATIN 100000 UNITS: 100000 CREAM TOPICAL at 11:00

## 2022-10-05 RX ADMIN — ENOXAPARIN SODIUM 40 MG: 40 INJECTION SUBCUTANEOUS at 18:00

## 2022-10-05 RX ADMIN — IPRATROPIUM BROMIDE AND ALBUTEROL SULFATE 3 ML: .5; 2.5 SOLUTION RESPIRATORY (INHALATION) at 06:58

## 2022-10-05 RX ADMIN — IPRATROPIUM BROMIDE AND ALBUTEROL SULFATE 3 ML: .5; 2.5 SOLUTION RESPIRATORY (INHALATION) at 22:17

## 2022-10-05 RX ADMIN — FAMOTIDINE 20 MG: 20 TABLET, FILM COATED ORAL at 05:25

## 2022-10-05 RX ADMIN — GUAIFENESIN AND DEXTROMETHORPHAN 5 ML: 100; 10 SYRUP ORAL at 03:41

## 2022-10-05 RX ADMIN — METHOCARBAMOL 750 MG: 750 TABLET ORAL at 05:25

## 2022-10-05 RX ADMIN — ARIPIPRAZOLE 30 MG: 10 TABLET ORAL at 05:25

## 2022-10-05 RX ADMIN — FAMOTIDINE 20 MG: 20 TABLET, FILM COATED ORAL at 18:00

## 2022-10-05 RX ADMIN — IPRATROPIUM BROMIDE AND ALBUTEROL SULFATE 3 ML: .5; 2.5 SOLUTION RESPIRATORY (INHALATION) at 13:58

## 2022-10-05 RX ADMIN — PREDNISONE 40 MG: 20 TABLET ORAL at 05:25

## 2022-10-05 RX ADMIN — BENZONATATE 100 MG: 100 CAPSULE ORAL at 09:03

## 2022-10-05 RX ADMIN — ATORVASTATIN CALCIUM 20 MG: 20 TABLET, FILM COATED ORAL at 22:07

## 2022-10-05 RX ADMIN — IPRATROPIUM BROMIDE AND ALBUTEROL SULFATE 3 ML: .5; 2.5 SOLUTION RESPIRATORY (INHALATION) at 17:51

## 2022-10-05 RX ADMIN — TRAZODONE HYDROCHLORIDE 50 MG: 100 TABLET ORAL at 22:07

## 2022-10-05 RX ADMIN — FUROSEMIDE 40 MG: 40 TABLET ORAL at 09:03

## 2022-10-05 RX ADMIN — IPRATROPIUM BROMIDE AND ALBUTEROL SULFATE 3 ML: .5; 2.5 SOLUTION RESPIRATORY (INHALATION) at 11:09

## 2022-10-05 RX ADMIN — BUDESONIDE AND FORMOTEROL FUMARATE DIHYDRATE 2 PUFF: 80; 4.5 AEROSOL RESPIRATORY (INHALATION) at 20:19

## 2022-10-05 RX ADMIN — CEFEPIME 2 G: 2 INJECTION, POWDER, FOR SOLUTION INTRAVENOUS at 09:03

## 2022-10-05 RX ADMIN — NYSTATIN 100000 UNITS: 100000 CREAM TOPICAL at 02:50

## 2022-10-05 RX ADMIN — NYSTATIN 100000 UNITS: 100000 CREAM TOPICAL at 22:07

## 2022-10-05 RX ADMIN — MONTELUKAST 10 MG: 10 TABLET, FILM COATED ORAL at 05:26

## 2022-10-05 ASSESSMENT — ENCOUNTER SYMPTOMS
NAUSEA: 0
FEVER: 1
DIZZINESS: 0
BLURRED VISION: 0
MYALGIAS: 0
BRUISES/BLEEDS EASILY: 0
NECK PAIN: 0
COUGH: 1
DEPRESSION: 0
PALPITATIONS: 0
HEMOPTYSIS: 0
CHILLS: 1
SHORTNESS OF BREATH: 1
HEARTBURN: 0
DOUBLE VISION: 0
HEADACHES: 0

## 2022-10-05 ASSESSMENT — PAIN DESCRIPTION - PAIN TYPE: TYPE: ACUTE PAIN

## 2022-10-05 NOTE — ED NOTES
Med rec updated and complete. Allergies reviewed.   Confirmed name and date of birth.      Pt denies antibiotic use at home.      [place] [facility] [phone]

## 2022-10-05 NOTE — ED NOTES
MEdicated per MAR.  PT maintaining O2 saturation on 4 L NC.  Provided food and drink at pt request.

## 2022-10-05 NOTE — ED NOTES
Pt unable to provide sputum sample, only able to spit up saliva.    Placed on hospital bed for comfort.

## 2022-10-05 NOTE — H&P
Hospital Medicine History & Physical Note    Date of Service  10/4/2022    Primary Care Physician  Heriberto Sharp M.D.    Consultants  none    Specialist Names: none     Code Status  Full Code    Chief Complaint  Chief Complaint   Patient presents with    Shortness of Breath     BIB [facility], increased oxygen needs from 2 liters baseline to 4 liters nasal cannula. Patient has a hoarse voice, cough, and coarse breath sounds. Patient reports feeling poorly since her discharge from the hospital on Sunday.        History of Presenting Illness  Lorene Haro is a 47 y.o. female with past medical history of chronic respiratory failure on home oxygen, morbid obesity,  who presented 10/4/2022 with shortness of breath, cough and subjective fever since discharge on 10/2/2022. She reports productive cough with fever and chills since yesterday. Upon further chart review, pulmonary was consulted during recent admission and reviewed PFT's from 2021 and deemed that her respiratory failure was not secondary to COPD rather obesity hypoventilation syndrome.     In the ER today she was found to have left basilar infiltrate suggestive of pneumonia. She was started on broad spectrum antibiotics for HCAP.     I discussed the plan of care with patient.    Review of Systems  Review of Systems   Constitutional:  Positive for chills, fever and malaise/fatigue.   HENT:  Negative for hearing loss and tinnitus.    Eyes:  Negative for blurred vision and double vision.   Respiratory:  Positive for cough and shortness of breath. Negative for hemoptysis.    Cardiovascular:  Negative for chest pain and palpitations.   Gastrointestinal:  Negative for heartburn and nausea.   Genitourinary:  Negative for dysuria and urgency.   Musculoskeletal:  Negative for myalgias and neck pain.   Skin:  Negative for rash.   Neurological:  Negative for dizziness and headaches.   Endo/Heme/Allergies:  Does not bruise/bleed easily.   Psychiatric/Behavioral:   Negative for depression and suicidal ideas.      Past Medical History   has a past medical history of A-fib ([facility]), Asthma, Bipolar 2 disorder ([facility]), Chickenpox, Hypertension, On home oxygen therapy, Other psychological stress, Schizophrenic disorder ([facility]), and Yeast infection of the skin (04/01/2021).    Surgical History   has a past surgical history that includes hysterectomy laparoscopy; colectomy; cholecystectomy; and knee arthroscopy (Left).     Family History  family history includes Cancer in her sister; No Known Problems in her mother.   Family history reviewed with patient. There is no family history that is pertinent to the chief complaint.     Social History   reports that she has been smoking cigarettes. She has been smoking an average of .25 packs per day. She has never used smokeless tobacco. She reports that she does not drink alcohol and does not use drugs.    Allergies  Allergies   Allergen Reactions    Penicillin G Rash and Itching     pt reports that she gets a rash all over her body and gets itchy    Amoxicillin Rash and Itching     Tolerates cephalosporins, pt reports that she gets a rash all over her body and gets itchy       Medications  Prior to Admission Medications   Prescriptions Last Dose Informant Patient Reported? Taking?   albuterol 108 (90 Base) MCG/ACT Aero Soln inhalation aerosol 10/4/2022 at 1330 Patient No No   Sig: Inhale 2 Puffs every 6 hours as needed for Shortness of Breath.   aripiprazole (ABILIFY) 30 MG tablet 10/4/2022 at 0900 Patient Yes No   Sig: Take 30 mg by mouth every morning.   atorvastatin (LIPITOR) 20 MG Tab 10/3/2022 at 2000 Patient Yes No   Sig: Take 20 mg by mouth at bedtime.   cyclobenzaprine (FLEXERIL) 5 mg tablet 10/3/2022 at 2000 Patient No No   Sig: Take 1-2 Tablets by mouth 3 times a day as needed for Mild Pain for up to 10 days.   fluticasone-salmeterol (ADVAIR) 100-50 MCG/ACT AEROSOL POWDER, BREATH ACTIVATED 10/4/2022 at 0900 Patient No No   Sig: Inhale 1  Puff every 12 hours.   furosemide (LASIX) 40 MG Tab 10/4/2022 at 0900 Patient No No   Sig: Take 1 Tablet by mouth every day for 30 days.   methocarbamol (ROBAXIN) 750 MG Tab 10/3/2022 at 2000 Patient No No   Sig: Take 1 Tablet by mouth 4 times a day. Take as needed for muscle spasm/strain.   montelukast (SINGULAIR) 10 MG Tab 10/4/2022 at 0900 Patient Yes No   Sig: Take 10 mg by mouth every morning.   nitroglycerin (NITROSTAT) 0.4 MG SL Tab unknown at unknown Patient No No   Sig: Place 1 tablet under the tongue as needed for Chest Pain.   nystatin (MYCOSTATIN) 782918 UNIT/GM Cream topical cream 10/3/2022 at 2000  Yes Yes   Sig: Apply 1 Application topically 2 times a day.   potassium chloride SA (K-DUR) 10 MEQ Tab CR 10/4/2022 at 0900 Patient No No   Sig: Take 1 Tablet by mouth every day for 30 days.   predniSONE (DELTASONE) 20 MG Tab 10/4/2022 at 0900 Patient No No   Sig: Take 2 Tablets by mouth every day for 4 days.   traZODone (DESYREL) 50 MG Tab 10/3/2022 at 2000 Patient No No   Sig: Take 1 Tablet by mouth at bedtime.      Facility-Administered Medications: None       Physical Exam  Temp:  [36.9 °C (98.4 °F)] 36.9 °C (98.4 °F)  Pulse:  [] 82  Resp:  [17-24] 17  BP: (123-145)/(56-77) 134/62  SpO2:  [92 %-97 %] 97 %  Blood Pressure: 134/62   Temperature: 36.9 °C (98.4 °F)   Pulse: 82   Respiration: 17   Pulse Oximetry: 97 %       Physical Exam  Vitals and nursing note reviewed.   Constitutional:       Appearance: She is obese. She is ill-appearing.   HENT:      Head: Normocephalic and atraumatic.      Right Ear: Tympanic membrane normal.      Left Ear: Tympanic membrane normal.      Nose: Nose normal.      Mouth/Throat:      Mouth: Mucous membranes are moist.      Pharynx: Oropharynx is clear.   Eyes:      Extraocular Movements: Extraocular movements intact.      Pupils: Pupils are equal, round, and reactive to light.   Cardiovascular:      Rate and Rhythm: Normal rate and regular rhythm.      Pulses: Normal  pulses.      Heart sounds: Normal heart sounds.   Pulmonary:      Effort: Pulmonary effort is normal.      Breath sounds: No stridor. Wheezing present.      Comments: Dec breath sounds   Abdominal:      General: Bowel sounds are normal. There is no distension.      Palpations: Abdomen is soft. There is no mass.      Tenderness: There is no abdominal tenderness.      Hernia: No hernia is present.   Musculoskeletal:         General: No swelling or tenderness. Normal range of motion.      Cervical back: Neck supple.   Skin:     General: Skin is warm.      Capillary Refill: Capillary refill takes less than 2 seconds.      Coloration: Skin is not jaundiced or pale.      Findings: Erythema (bilateral Lower extremity chronic erythema) present.   Neurological:      General: No focal deficit present.      Mental Status: She is alert and oriented to person, place, and time. Mental status is at baseline.      Cranial Nerves: No cranial nerve deficit.      Sensory: No sensory deficit.   Psychiatric:         Mood and Affect: Mood normal.         Behavior: Behavior normal.       Laboratory:  Recent Labs     10/04/22  2030   WBC 9.7   RBC 4.89   HEMOGLOBIN 12.9   HEMATOCRIT 41.3   MCV 84.5   MCH 26.4*   MCHC 31.2*   RDW 51.3*   PLATELETCT 288   MPV 11.3     Recent Labs     10/04/22  2030   SODIUM 141   POTASSIUM 3.8   CHLORIDE 97   CO2 30   GLUCOSE 117*   BUN 21   CREATININE 1.03   CALCIUM 10.1     Recent Labs     10/04/22  2030   GLUCOSE 117*         Recent Labs     10/04/22  2030   NTPROBNP 276*         Recent Labs     10/04/22  2030   TROPONINT 20*       Imaging:  DX-CHEST-PORTABLE (1 VIEW)   Final Result         1.  Left basilar atelectasis or early infiltrate.   2.  Atherosclerosis          X-Ray:  I have personally reviewed the images and compared with prior images.    Assessment/Plan:  Justification for Admission Status  I anticipate this patient will require at least two midnights for appropriate medical management,  necessitating inpatient admission because acute on chronic hypoxic respiratory failure secondary to pneumonia.    Patient will need a Med/Surg bed on MEDICAL service .  The need is secondary to see above.    * Acute on chronic respiratory failure with hypercapnia (HCC)- (present on admission)  Assessment & Plan  Secondary to pneumonia and underlying restrictive airway disease  O2/RT  Duo-nebs  Prednisone 40 mg po x 5 days   C/w Advair   10/22 Echo Ef 65% normal LVF    Pneumonia of left lower lobe due to infectious organism  Assessment & Plan  Abx HCAP coverage Vanc/Cefepime  F/u sputum, blood cx  O2/RT protocol     HLD (hyperlipidemia)- (present on admission)  Assessment & Plan  statin    Schizophrenia (HCC)- (present on admission)  Assessment & Plan  Abilify     Tobacco use- (present on admission)  Assessment & Plan  Reports she quit 2 weeks ago    Morbid obesity with alveolar hypoventilation (HCC)- (present on admission)  Assessment & Plan  Educate on lifestyle and dietary modification     KATHIA (obstructive sleep apnea)- (present on admission)  Assessment & Plan  Nocturnal CPAP      VTE prophylaxis: SCDs/TEDs and enoxaparin ppx

## 2022-10-05 NOTE — PROGRESS NOTES
"Pharmacy Vancomycin Kinetics Note for 10/5/2022     47 y.o. female on Vancomycin day # 1     Vancomycin Indication (AUC Dosing): S. aureus pneumonia    Provider specified end date: 10/09/22    Active Antibiotics (From admission, onward)      Ordered     Ordering Provider       Wed Oct 5, 2022  3:06 AM    10/05/22 0306  vancomycin (VANCOCIN) 1,250 mg in  mL IVPB  (vancomycin (VANCOCIN) IV (LD + Maintenance))  EVERY 12 HOURS         Go Cho M.D.       Wed Oct 5, 2022  2:03 AM    10/05/22 0203  cefepime (Maxipime) 2 g in  mL IVPB  EVERY 12 HOURS         Go Cho M.D.       Tue Oct 4, 2022 10:40 PM    10/04/22 2240  MD Alert...Vancomycin per Pharmacy  PHARMACY TO DOSE        Question:  Indication(s) for vancomycin?  Answer:  Pneumonia    Go hCo M.D.       Tue Oct 4, 2022 10:00 PM    10/04/22 2200  vancomycin (VANCOCIN) 3 g in  mL IVPB  (vancomycin (VANCOCIN) IV (LD + Maintenance))  ONCE         Rodney Otero M.D.            Dosing Weight: 135 kg (297 lb 9.9 oz)      Admission History: Admitted on 10/4/2022 for COPD with acute exacerbation (HCC) [J44.1]  Pertinent history: pt w/hx of COPD presenting w/SOB. Starting empiric abx cefepime and vancomycin for pneumonia.    Allergies:     Penicillin g and Amoxicillin     Pertinent cultures to date:     Results       Procedure Component Value Units Date/Time    [facility] By PCR (Amp) [[id number]] Collected: 10/04/22 2304    Order Status: Sent Specimen: Respirate from Nares Updated: 10/05/22 0036    Blood Culture [[id number]] Collected: 10/04/22 2030    Order Status: Sent Specimen: Blood from Peripheral Updated: 10/05/22 0035    Narrative:      1 of 2 for Blood Culture x 2 sites order. Per Hospital  Policy: Only change Specimen Src: to \"Line\" if specified by  physician order.    Blood Culture [[id number]] Collected: 10/04/22 2316    Order Status: Sent Specimen: Blood from Peripheral Updated: 10/05/22 0023    Narrative:      2 of 2 blood " "culture x2  Sites order. Per Hospital Policy:  Only change Specimen Src: to \"Line\" if specified by physician  order.    CULTURE RESPIRATORY W/ GRM STN [[id number]]     Order Status: Completed Specimen: Respirate from Sputum     COV-2, FLU A/B, AND RSV BY PCR (2-4 HOURS CEPHEID): Collect NP swab in VTM [[id number]]     Order Status: Completed Specimen: Respirate             Labs:     Estimated Creatinine Clearance: 87.7 mL/min (by C-G formula based on SCr of 1.03 mg/dL).  Recent Labs     10/04/22  2030   WBC 9.7   NEUTSPOLYS 73.10*     Recent Labs     10/04/22  2030   BUN 21   CREATININE 1.03     No intake or output data in the 24 hours ending 10/05/22 0307   /62   Pulse 82   Temp 36.9 °C (98.4 °F) (Temporal)   Resp 17   Ht 1.575 m (5' 2\")   Wt (!) 135 kg (297 lb)   SpO2 97%  Temp (24hrs), Av.9 °C (98.4 °F), Min:36.9 °C (98.4 °F), Max:36.9 °C (98.4 °F)      List concerns for Vancomycin clearance:     Obesity;BUN/Scr ratio greater than 20:1    Pharmacokinetics:     AUC kinetics:   Ke (hr ^-1): 0.0772 hr^-1  Half life: 8.98 hr  Clearance: 5.349  Estimated TDD: 2674.5  Estimated Dose: 1226  Estimated interval: 11    A/P:     -  Vancomycin dose: 1250 mg q12h (@0000,1200)    -  Next vancomycin level(s): None ordered; @steady state    -  Predicted vancomycin AUC from initial AUC test calculator: 467 mg·hr/L    -  Comments: Concern for accumulation due to obesity. Pharmacy will monitor and obtain level at steady state.    Emily Moncada, PharmD    "

## 2022-10-05 NOTE — PROGRESS NOTES
Hospital Medicine Daily Progress Note    Date of Service  10/5/2022    Chief Complaint  Lorene Haro is a 47 y.o. female admitted 10/4/2022 with RLL PNA    Hospital Course  This is a 47-year-old female with past medical history of hypertension, dyslipidemia, chronic diastolic heart failure LVEF 65%, KATHIA on CPAP, chronic respiratory failure on 3L secondary to obesity hypoventilation syndrome, asthma, bipolar disorder, morbid obesity with a BMI of 54 and tobacco use disorder who was admitted on 10/4/2022 with acute on chronic respiratory failure with hypercapnia secondary to left lower lobe pneumonia and underlying restrictive airway disease.    Patient reported fever, chills and productive cough, CXR with concerns for pneumonia.  Patient to be treated for hospital-acquired pneumonia as she was recently hospitalized.    Of note patient was recently admitted, 10/1-10/2 for shortness of breath.  Patient underwent IV diuresis for acute on chronic diastolic heart failure exacerbation and underwent steroid treatment for asthma exacerbation.  Echo was performed noted LVEF of 65%.    Interval Problem Update  Patient reported fever, chills and productive cough, CXR with concerns for pneumonia.  Patient to be treated for hospital-acquired pneumonia as she was recently hospitalized.    Of note patient was recently admitted, 10/1-10/2 for shortness of breath.  Patient underwent IV diuresis for acute on chronic diastolic heart failure exacerbation and underwent steroid treatment for asthma exacerbation.  Echo was performed noted LVEF of 65%.    I have discussed this patient's plan of care and discharge plan at IDT rounds today with Case Management, Nursing, Nursing leadership, and other members of the IDT team.    Consultants/Specialty  None    Code Status  Full Code    Disposition  Patient is not medically cleared for discharge.   Anticipate discharge to to home with close outpatient follow-up.  I have placed the  appropriate orders for post-discharge needs.    Review of Systems  Review of Systems   Respiratory:  Positive for shortness of breath.    All other systems reviewed and are negative.     Physical Exam  Temp:  [36.9 °C (98.4 °F)] 36.9 °C (98.4 °F)  Pulse:  [] 72  Resp:  [11-33] 13  BP: (105-164)/() 129/64  SpO2:  [87 %-97 %] 96 %    Physical Exam  Vitals and nursing note reviewed.   Constitutional:       General: She is not in acute distress.     Appearance: Normal appearance. She is obese. She is ill-appearing.   HENT:      Head: Normocephalic and atraumatic.      Right Ear: Tympanic membrane normal.      Left Ear: Tympanic membrane normal.      Nose: Nose normal.      Mouth/Throat:      Mouth: Mucous membranes are moist.      Pharynx: Oropharynx is clear. No oropharyngeal exudate.   Eyes:      Extraocular Movements: Extraocular movements intact.      Pupils: Pupils are equal, round, and reactive to light.   Cardiovascular:      Rate and Rhythm: Normal rate and regular rhythm.      Pulses: Normal pulses.      Heart sounds: Normal heart sounds. No murmur heard.    No friction rub. No gallop.   Pulmonary:      Effort: Pulmonary effort is normal. No respiratory distress.      Breath sounds: No stridor. No wheezing, rhonchi or rales.      Comments: Dec breath sounds   Abdominal:      General: Bowel sounds are normal. There is no distension.      Palpations: Abdomen is soft. There is no mass.      Tenderness: There is no abdominal tenderness.      Hernia: No hernia is present.   Musculoskeletal:         General: No swelling or tenderness. Normal range of motion.      Cervical back: Normal range of motion and neck supple. No rigidity. No muscular tenderness.      Right lower leg: No edema.      Left lower leg: No edema.   Skin:     General: Skin is warm and dry.      Capillary Refill: Capillary refill takes less than 2 seconds.      Coloration: Skin is not jaundiced or pale.      Findings: No erythema or rash.    Neurological:      General: No focal deficit present.      Mental Status: She is alert and oriented to person, place, and time. Mental status is at baseline.      Cranial Nerves: No cranial nerve deficit.      Sensory: No sensory deficit.      Motor: No weakness.      Gait: Gait normal.   Psychiatric:         Mood and Affect: Mood normal.         Behavior: Behavior normal.       Fluids    Intake/Output Summary (Last 24 hours) at 10/5/2022 1634  Last data filed at 10/5/2022 0322  Gross per 24 hour   Intake 1500 ml   Output 1000 ml   Net 500 ml       Laboratory  Recent Labs     10/04/22  2030   WBC 9.7   RBC 4.89   HEMOGLOBIN 12.9   HEMATOCRIT 41.3   MCV 84.5   MCH 26.4*   MCHC 31.2*   RDW 51.3*   PLATELETCT 288   MPV 11.3     Recent Labs     10/04/22  2030   SODIUM 141   POTASSIUM 3.8   CHLORIDE 97   CO2 30   GLUCOSE 117*   BUN 21   CREATININE 1.03   CALCIUM 10.1                   Imaging  DX-CHEST-PORTABLE (1 VIEW)   Final Result         1.  Left basilar atelectasis or early infiltrate.   2.  Atherosclerosis           Assessment/Plan  * Acute on chronic respiratory failure with hypercapnia (HCC)- (present on admission)  Assessment & Plan  Secondary to pneumonia and underlying restrictive airway disease  O2/RT  Duo-nebs  Prednisone 40 mg po x 5 days   C/w Advair   10/22 Echo Ef 65% normal LVF    Pneumonia of left lower lobe due to infectious organism  Assessment & Plan  Abx HCAP coverage Vanc/Cefepime  F/u sputum, blood cx  O2/RT protocol     HLD (hyperlipidemia)- (present on admission)  Assessment & Plan  statin    Schizophrenia (HCC)- (present on admission)  Assessment & Plan  Abilify     Tobacco use- (present on admission)  Assessment & Plan  Reports she quit 2 weeks ago    Morbid obesity with alveolar hypoventilation (HCC)- (present on admission)  Assessment & Plan  Educate on lifestyle and dietary modification     KATHIA (obstructive sleep apnea)- (present on admission)  Assessment & Plan  Nocturnal CPAP        VTE prophylaxis: SCDs/TEDs and enoxaparin ppx    I have performed a physical exam and reviewed and updated ROS and Plan today (10/5/2022). In review of yesterday's note (10/4/2022), there are no changes except as documented above.

## 2022-10-05 NOTE — ED PROVIDER NOTES
ER Provider Note     Scribed for Rodney Otero M.D. by Luis Enrique Betts. 10/4/2022, 8:42 PM.    Primary Care Provider: Heriberto Sharp M.D.  Means of Arrival: EMS   History obtained from: Patient  History limited by: None     CHIEF COMPLAINT  Chief Complaint   Patient presents with    Shortness of Breath     BIB [facility], increased oxygen needs from 2 liters baseline to 4 liters nasal cannula. Patient has a hoarse voice, cough, and coarse breath sounds. Patient reports feeling poorly since her discharge from the hospital on Sunday.        HPI  Lorene Haro is a 47 y.o. female with a history of COPD who presents to the Emergency Department via EMS for shortness of breath onset today. She was seen in the hospital on Sunday for shortness of breath and chest pain. After she left she began to develop a cough which has worsened since discharge. She has associated symptoms of nausea. She denies any fevers or chills.She takes 2L of supplemental oxygen at home but since admission has increased to 4L.     REVIEW OF SYSTEMS  See HPI for further details. All other systems are negative.     PAST MEDICAL HISTORY   has a past medical history of A-fib ([facility]), Asthma, Bipolar 2 disorder (HCC), Chickenpox, Hypertension, On home oxygen therapy, Other psychological stress, Schizophrenic disorder (HCC), and Yeast infection of the skin (04/01/2021).    SURGICAL HISTORY   has a past surgical history that includes hysterectomy laparoscopy; colectomy; cholecystectomy; and knee arthroscopy (Left).    SOCIAL HISTORY  Social History     Tobacco Use    Smoking status: Every Day     Packs/day: 0.25     Types: Cigarettes    Smokeless tobacco: Never   Vaping Use    Vaping Use: Never used   Substance Use Topics    Alcohol use: Never    Drug use: Never      Social History     Substance and Sexual Activity   Drug Use Never       FAMILY HISTORY  Family History   Problem Relation Age of Onset    No Known Problems Mother     Cancer Sister   "      CURRENT MEDICATIONS  Home Medications       Reviewed by Lidia Calhoun R.N. (Registered Nurse) on 10/04/22 at 2019  Med List Status: Partial     Medication Last Dose Status   albuterol 108 (90 Base) MCG/ACT Aero Soln inhalation aerosol  Active   aripiprazole (ABILIFY) 30 MG tablet  Active   atorvastatin (LIPITOR) 20 MG Tab  Active   cyclobenzaprine (FLEXERIL) 5 mg tablet  Active   famotidine (PEPCID) 20 MG Tab  Active   fluticasone-salmeterol (ADVAIR) 100-50 MCG/ACT AEROSOL POWDER, BREATH ACTIVATED  Active   furosemide (LASIX) 40 MG Tab  Active   ketorolac (TORADOL) 10 MG Tab  Active   methocarbamol (ROBAXIN) 750 MG Tab  Active   montelukast (SINGULAIR) 10 MG Tab  Active   nitroglycerin (NITROSTAT) 0.4 MG SL Tab  Active   nystatin (MYCOSTATIN) 683785 UNIT/GM Cream topical cream  Active   potassium chloride SA (K-DUR) 10 MEQ Tab CR  Active   predniSONE (DELTASONE) 20 MG Tab  Active   traZODone (DESYREL) 50 MG Tab  Active                    ALLERGIES  Allergies   Allergen Reactions    Penicillin G Rash and Itching     pt reports that she gets a rash all over her body and gets itchy    Amoxicillin Rash and Itching     Tolerates cephalosporins, pt reports that she gets a rash all over her body and gets itchy       PHYSICAL EXAM  VITAL SIGNS: BP (!) 142/63   Pulse 100   Temp 36.9 °C (98.4 °F) (Temporal)   Resp (!) 24   Ht 1.575 m (5' 2\")   Wt (!) 135 kg (297 lb)   LMP  (LMP Unknown)   SpO2 92%   BMI 54.32 kg/m²      Constitutional: Alert in no apparent distress.  HENT: No signs of trauma, Bilateral external ears normal, Nose normal.   Eyes: Pupils are equal and reactive, Conjunctiva normal, Non-icteric.   Neck: Normal range of motion, No tenderness, Supple, No stridor.   Lymphatic: No lymphadenopathy noted.   Cardiovascular: Regular rate and rhythm, no palpable thrill  Thorax & Lungs: Wheezing throughout lung fields. No chest tenderness.  CTAB  Abdomen: Bowel sounds normal, Soft, No tenderness, No " masses, No pulsatile masses. No peritoneal signs.  Skin: Warm, Dry, No erythema, No rash.   Back: No bony tenderness, No CVA tenderness.   Extremities: Pitting edema to bilateral lower extremities. Intact distal pulses, No tenderness, No cyanosis.  Musculoskeletal: Good range of motion in all major joints. No tenderness to palpation or major deformities noted.   Neurologic: Alert , Normal motor function, Normal sensory function, No focal deficits noted.   Psychiatric: Affect normal, Judgment normal, Mood normal.     DIAGNOSTIC STUDIES / PROCEDURES    EKG Interpretation:  Interpreted by me    12 Lead EKG interpreted by me to show:  Normal sinus rhythm  Rate 91  Axis: Normal  Intervals: Normal  Normal T waves, no inversion  Normal ST segments, no signs of ST elevation or depression  My impression of this EKG: Does not indicate ischemia or arrhythmia at this time.     LABS  Labs Reviewed   CBC WITH DIFFERENTIAL - Abnormal; Notable for the following components:       Result Value    MCH 26.4 (*)     MCHC 31.2 (*)     RDW 51.3 (*)     Neutrophils-Polys 73.10 (*)     Lymphocytes 17.20 (*)     Monos (Absolute) 0.89 (*)     All other components within normal limits    Narrative:     Biotin intake of greater than 5 mg per day may interfere with  troponin levels, causing false low values.   BASIC METABOLIC PANEL - Abnormal; Notable for the following components:    Glucose 117 (*)     All other components within normal limits    Narrative:     Biotin intake of greater than 5 mg per day may interfere with  troponin levels, causing false low values.   TROPONIN - Abnormal; Notable for the following components:    Troponin T 20 (*)     All other components within normal limits    Narrative:     Biotin intake of greater than 5 mg per day may interfere with  troponin levels, causing false low values.   PROBRAIN NATRIURETIC PEPTIDE, NT - Abnormal; Notable for the following components:    NT-proBNP 276 (*)     All other components  within normal limits    Narrative:     Biotin intake of greater than 5 mg per day may interfere with  troponin levels, causing false low values.   COV-2, FLU A/B, AND RSV BY PCR (CEPHEID)   ESTIMATED GFR    Narrative:     Biotin intake of greater than 5 mg per day may interfere with  troponin levels, causing false low values.   LACTIC ACID   LACTIC ACID   BLOOD CULTURE   BLOOD CULTURE   LACTIC ACID    Narrative:     Biotin intake of greater than 5 mg per day may interfere with  troponin levels, causing false low values.     All labs reviewed by me.    RADIOLOGY  DX-CHEST-PORTABLE (1 VIEW)   Final Result         1.  Left basilar atelectasis or early infiltrate.   2.  Atherosclerosis         The radiologist's interpretation of all radiological studies have been reviewed by me.    COURSE & MEDICAL DECISION MAKING  Pertinent Labs & Imaging studies reviewed. (See chart for details)    This is a 47 y.o. female that presents with shortness of breath.  The patient has a COPD exacerbation.  I will evaluate for COVID.  I will evaluate for congestive heart failure.  I will evaluate for sepsis and then reassess.    8:42 PM - Patient seen and examined at bedside. Ordered DX-chest, CoV-2, Flu A/B and RSV PCR, troponin, BNP NT, CBC w/ diff, BMP, estimated GFR, lactic acid and EKG.  Patient will be medicated with prednisone 60 mg and given a Duoneb breathing treatment for her symptoms.     9:56 PM - Paged Hospitalist. Ordered blood culture x2 and lactic acid every 2 hours.    10:03 PM - I discussed the patient's case and the above findings with Dr. Cho (Hospitalist) who will assess the patient for hospitalization.      The patient has slightly elevated troponin.  The patient has no leukocytosis.  The patient has a proBNP that is mildly elevated.  COVID is pending at this time.  The patient has pneumonia.  He has an increased oxygen requirement.  He will need to be admitted to the hospital.    DISPOSITION:  Patient will be  hospitalized by Dr. Cho in guarded condition.    CRITICAL CARE:  The total critical care time on this patient is 35 minutes, resuscitating patient, speaking with admitting physician, and deciphering test results. This  is exclusive of separately billable procedures.    FINAL IMPRESSION  1. Acute exacerbation of chronic obstructive pulmonary disease (COPD) (HCC)    2. Pneumonia due to infectious organism, unspecified laterality, unspecified part of lung    3. Hypoxia          Luis Enrique HARRIS (Scribe), am scribing for, and in the presence of, Rodney Otero M.D..    Electronically signed by: Lius Enrique Betts (Scribe), 10/4/2022    Rodney HARRIS M.D. personally performed the services described in this documentation, as scribed by Luis Enrique Betts in my presence, and it is both accurate and complete.     The note accurately reflects work and decisions made by me.  Rodney Otero M.D.  10/5/2022  3:40 AM

## 2022-10-05 NOTE — ASSESSMENT & PLAN NOTE
Possibly secondary to pneumonia and underlying restrictive airway disease, URI    --O2/RT. Still requiring increased O2 demands  --Duo-nebs, symbicort scheduled  --Prednisone 40 mg po x 5 days   --C/w Advair   --10/22 Echo Ef 65% normal LVF  --C4,vancomycin switch to C3, Azithromycin (pending pro calcitonin negative)

## 2022-10-05 NOTE — ED TRIAGE NOTES
".  Chief Complaint   Patient presents with    Shortness of Breath     BIB [facility], increased oxygen needs from 2 liters baseline to 4 liters nasal cannula. Patient has a hoarse voice, cough, and coarse breath sounds. Patient reports feeling poorly since her discharge from the hospital on Sunday.      Patient was given albuterol inhaler en route. No relief per patient.     .Ht 1.575 m (5' 2\")   Wt (!) 135 kg (297 lb)     "

## 2022-10-05 NOTE — ASSESSMENT & PLAN NOTE
Patient does not look clinically septic. Increased O2 demand, hoarseness, cough. Treating empirically as a pneumonia.    F/u sputum, blood cx  O2/RT protocol  --De-escalating antibiotics to CAP regimen. If procal is negative will consider further de-escalation. Possible URI contributing   -C3 2g x 5 days, Azithromycin 500mg PO x3 days.

## 2022-10-05 NOTE — ED NOTES
HISTORY OF PRESENT ILLNESS:  Brian Lopez is a 54 year old female who presents to the office today for cough.  Chief Complaint   Patient presents with   • Follow-up     ER /cough   Patient has been complaining about a cough for over one month. Was seen in the emergency room June 13, 2018 and was diagnosed with bronchitis. X-ray which was done was normal. She has been restarted on Z-Deshaun and Tessalon Perles. She had previously been given that about 2 weeks ago and he did that it did not help her symptoms. She is also on albuterol inhaler but seems to needed 4 times a day. Has shortness of breath, denies chest pain except with cough. Denies any nausea or vomiting. Denies any fevers or chills.  Does have itching around her lips and is on Zyrtec and Flonase.  Patient does states she only takes it as needed.    Past Medical History:   Diagnosis Date   • Anxiety disorder due to medical condition 3/14/2018   • Asthma    • Essential (primary) hypertension    • Gastroesophageal reflux disease    • Head ache    • Hypercholesteremia    • Seizure disorder as sequela of cerebrovascular accident (CMS/[facility]) 11/24/2016    discharged by neurology   • Sleep apnea    • Stroke (CMS/[facility]) 11/24/2016    intracranial hemorrhage   • Type 2 diabetes mellitus with stage 3 chronic kidney disease, with long-term current use of insulin (CMS/[facility])        Past Surgical History:   Procedure Laterality Date   • Bunionectomy Left 01/05/2018    Dr. Fallon       Family History   Problem Relation Age of Onset   • Diabetes Mother    • Heart disease Mother    • Kidney disease Mother    • Hypertension Mother    • Myocardial Infarction Mother    • Diabetes Sister    • Diabetes Brother    • Diabetes Sister        Social History     Social History   • Marital status: Single     Spouse name: N/A   • Number of children: N/A   • Years of education: N/A     Occupational History   • disability      Social History Main Topics   • Smoking status: Never Smoker   •  Purewick placed on patient   Smokeless tobacco: Never Used   • Alcohol use No   • Drug use: No   • Sexual activity: Not on file     Other Topics Concern   • Not on file     Social History Narrative   • No narrative on file         Current Outpatient Prescriptions   Medication Sig Dispense Refill   • azithromycin (ZITHROMAX) 250 MG tablet Take 250 mg by mouth daily.     • benzonatate (TESSALON PERLES) 200 MG capsule Take 200 mg by mouth 3 times daily as needed for Cough.     • amLODIPine (NORVASC) 5 MG tablet Take 1 tablet by mouth daily. 90 tablet 0   • acetaminophen-codeine (TYLENOL/CODEINE #3) 300-30 MG per tablet Take 1 tablet by mouth every 4 hours as needed (cough). 10 tablet 0   • escitalopram (LEXAPRO) 20 MG tablet Take 1 tablet by mouth daily. 30 tablet 0   • glipiZIDE (GLUCOTROL) 10 MG tablet TAKE 2 TABLETS(20 MG) BY MOUTH TWICE DAILY BEFORE MEALS 360 tablet 0   • meclizine HCl (ANTIVERT) 25 MG tablet Take 1 tablet by mouth 3 times daily as needed for Dizziness. 30 tablet 0   • losartan (COZAAR) 50 MG tablet Take 1 tablet by mouth daily. 30 tablet 0   • insulin glargine (LANTUS) 100 UNIT/ML injectable solution Inject 24 Units into the skin nightly. 10 mL 0   • albuterol 108 (90 Base) MCG/ACT inhaler Inhale 2 puffs into the lungs every 4 hours as needed for Shortness of Breath or Wheezing. 1 Inhaler 2   • fluticasone (FLONASE) 50 MCG/ACT nasal spray Spray 2 sprays in each nostril daily. 16 g 1   • cetirizine (ZYRTEC) 10 MG tablet Take 1 tablet by mouth daily. 30 tablet 2   • acyclovir (ZOVIRAX) 400 MG tablet Take 1 tablet by mouth 3 times daily. 21 tablet 0   • cloNIDine (CATAPRES) 0.2 MG tablet Take 1 tablet by mouth 3 times daily.     • glipiZIDE (GLUCOTROL) 5 MG tablet Take 5 mg by mouth 2 times daily (before meals).     • clopidogrel (PLAVIX) 75 MG tablet Take 1 tablet by mouth daily. 30 tablet 0   • atorvastatin (LIPITOR) 40 MG tablet Take 2 tablets by mouth daily.     • sitaGLIPtin (JANUVIA) 50 MG tablet Take 1 tablet by mouth  daily. (Patient taking differently: Take 100 mg by mouth daily. ) 60 tablet 0   • hydrOXYzine (ATARAX) 50 MG tablet Take 50 mg by mouth at bedtime.      • potassium chloride 10 MEQ CR tablet Take 10 mEq by mouth daily.     • acetaminophen (TYLENOL) 325 MG tablet Take 650 mg by mouth every 4 hours as needed for Pain.     • carvedilol (COREG) 3.125 MG tablet Take 3.125 mg by mouth 2 times daily (with meals).     • hydrochlorothiazide (HYDRODIURIL) 12.5 MG tablet Take 12.5 mg by mouth daily.      • levetiracetam (KEPPRA) 750 MG tablet Take 750 mg by mouth 2 times daily.     • omeprazole (PRILOSEC) 20 MG capsule Take 20 mg by mouth daily.       No current facility-administered medications for this visit.        ALLERGIES:   Allergen Reactions   • Sulfa Antibiotics PRURITUS        REVIEW OF SYSTEMS:  Constitutional: No fever or chills, no change in weight.   Cardiovascular: No chest pain, lower extremity edema, palpitations or syncope.  Respiratory: + shortness of breath, +cough, mild sputum, no hemoptysis.  Skin: No rash or color change. + itching.  Eyes: No visual disturbance or diplopia. Dizziness has improved with meclizine.  Ears/Nose/Throat: No nose bleeds, dysphagia or slurred speech.  Gastrointestinal: No abdominal pain, nausea or vomiting. No melena, hematochezia or hematemesis.  Neurologic: No loss of consciousness, no weakness or numbness.    PHYSICAL EXAM:  VITAL SIGNS:    Visit Vitals  /60   Pulse 64   Temp 99.4 °F (37.4 °C) (Oral)   Resp 18   Ht 5' 1\" (1.549 m)   Wt 74.3 kg   SpO2 99%   BMI 30.97 kg/m²      GENERAL:  Alert, oriented, NAD.    HEAD: Normocephalic. Atraumatic.   EYES: Pupils equal and reactive to light. Extra ocular movements intact. Normal conjunctiva. No discharge.   THROAT: Oral mucosa moist. No oral exudates. No tonsillar enlargement. Normal dentition.  NECK: Soft, supple. + Slight thyromegaly noted. No lymphadenopathy palpable. Trachea midline.  Normal ROM.   CARDIOVASCULAR: S1, S2  heard.  Normal rate. Normal rhythm. No murmurs, gallops, or rubs.    RESPIRATORY:  Coarse breath sounds. + crackles. + wheezing.  Poor air entry.  SKIN: No erythema. No rash. No bruising.    EXTREMITIES: No edema, peripheral pulses palpable.  N/V intact.  No cyanosis.  PSYCHIATRIC: Speech and behavior appropriate.       ASSESSMENT/PLAN:  1. Bronchospasm-neb treatment done in the office. Would recommend continuing the pack. Will add a Medrol Dosepak. Would also like to change inhaler to steroid with long-acting would recommend Symbicort. Discussed with patient. Follow-up in 1-2 weeks sooner if worse.  With cough and itching around face, send to allergist.      There are no diagnoses linked to this encounter.

## 2022-10-05 NOTE — ED NOTES
Alerted RT that pt is desaturating while on Cpap.  Will increase oxygen to cpap per their instruction.  Pt finally sleeping and appears comfortable.

## 2022-10-05 NOTE — HOSPITAL COURSE
This is a 47-year-old female with past medical history of hypertension, dyslipidemia, chronic diastolic heart failure LVEF 65%, KATHIA on CPAP, chronic respiratory failure on 3L secondary to obesity hypoventilation syndrome, asthma, bipolar disorder, morbid obesity with a BMI of 54 and tobacco use disorder who was admitted on 10/4/2022 with acute on chronic respiratory failure with hypercapnia secondary to left lower lobe pneumonia and underlying restrictive airway disease.    Patient reported fever, chills and productive cough, CXR with concerns for pneumonia. Patient had a recent asthma exacerbation which likely was likely a contributing factor.  Patient was treated for a hospital-acquired pneumonia initially with cefepime and vancomycin; possible asthma exacerbation was treated with prednisone 40mg PO in addition to inhalers. Due to clinical improvement antibiotics were de-escalated to a CAP regimen of ceftriaxone and azithromycin. At discharge, patient was further de-escalated to a total azithromycin course for CAP of 3 days.   Patient was discharged on previous home respiratory medications of advair, and albuterol. Prednisone to continue for 4 more days post discharge. At time of discharge patient had reached baseline oxygen demands. We had reordered home oxygen as staff had reported that orders had previously fallen through.

## 2022-10-06 LAB
FERRITIN SERPL-MCNC: 251 NG/ML (ref 10–291)
FLUAV RNA SPEC QL NAA+PROBE: NEGATIVE
FLUBV RNA SPEC QL NAA+PROBE: NEGATIVE
HGB RETIC QN AUTO: 26.2 PG/CELL (ref 29–35)
IMM RETICS NFR: 13.2 % (ref 9.3–17.4)
PROCALCITONIN SERPL-MCNC: 0.08 NG/ML
RETICS # AUTO: 0.07 M/UL (ref 0.04–0.06)
RETICS/RBC NFR: 1.5 % (ref 0.8–2.1)
RSV RNA SPEC QL NAA+PROBE: NEGATIVE
SARS-COV-2 RNA RESP QL NAA+PROBE: NOTDETECTED
SPECIMEN SOURCE: NORMAL
VIT B12 SERPL-MCNC: 358 PG/ML (ref 211–911)

## 2022-10-06 PROCEDURE — 94760 N-INVAS EAR/PLS OXIMETRY 1: CPT

## 2022-10-06 PROCEDURE — 94640 AIRWAY INHALATION TREATMENT: CPT

## 2022-10-06 PROCEDURE — 700101 HCHG RX REV CODE 250: Performed by: STUDENT IN AN ORGANIZED HEALTH CARE EDUCATION/TRAINING PROGRAM

## 2022-10-06 PROCEDURE — 700102 HCHG RX REV CODE 250 W/ 637 OVERRIDE(OP): Performed by: EMERGENCY MEDICINE

## 2022-10-06 PROCEDURE — A9270 NON-COVERED ITEM OR SERVICE: HCPCS | Performed by: STUDENT IN AN ORGANIZED HEALTH CARE EDUCATION/TRAINING PROGRAM

## 2022-10-06 PROCEDURE — C9803 HOPD COVID-19 SPEC COLLECT: HCPCS

## 2022-10-06 PROCEDURE — 0241U HCHG SARS-COV-2 COVID-19 NFCT DS RESP RNA 4 TRGT MIC: CPT

## 2022-10-06 PROCEDURE — 94660 CPAP INITIATION&MGMT: CPT

## 2022-10-06 PROCEDURE — 82728 ASSAY OF FERRITIN: CPT

## 2022-10-06 PROCEDURE — 770001 HCHG ROOM/CARE - MED/SURG/GYN PRIV*

## 2022-10-06 PROCEDURE — 82607 VITAMIN B-12: CPT

## 2022-10-06 PROCEDURE — A9270 NON-COVERED ITEM OR SERVICE: HCPCS | Performed by: GENERAL PRACTICE

## 2022-10-06 PROCEDURE — 84145 PROCALCITONIN (PCT): CPT

## 2022-10-06 PROCEDURE — 700102 HCHG RX REV CODE 250 W/ 637 OVERRIDE(OP): Performed by: STUDENT IN AN ORGANIZED HEALTH CARE EDUCATION/TRAINING PROGRAM

## 2022-10-06 PROCEDURE — A9270 NON-COVERED ITEM OR SERVICE: HCPCS

## 2022-10-06 PROCEDURE — 700102 HCHG RX REV CODE 250 W/ 637 OVERRIDE(OP)

## 2022-10-06 PROCEDURE — 99233 SBSQ HOSP IP/OBS HIGH 50: CPT | Mod: GC | Performed by: INTERNAL MEDICINE

## 2022-10-06 PROCEDURE — A9270 NON-COVERED ITEM OR SERVICE: HCPCS | Performed by: EMERGENCY MEDICINE

## 2022-10-06 PROCEDURE — 85046 RETICYTE/HGB CONCENTRATE: CPT

## 2022-10-06 PROCEDURE — 700102 HCHG RX REV CODE 250 W/ 637 OVERRIDE(OP): Performed by: GENERAL PRACTICE

## 2022-10-06 PROCEDURE — 700111 HCHG RX REV CODE 636 W/ 250 OVERRIDE (IP)

## 2022-10-06 PROCEDURE — 700111 HCHG RX REV CODE 636 W/ 250 OVERRIDE (IP): Performed by: STUDENT IN AN ORGANIZED HEALTH CARE EDUCATION/TRAINING PROGRAM

## 2022-10-06 PROCEDURE — 36415 COLL VENOUS BLD VENIPUNCTURE: CPT

## 2022-10-06 RX ORDER — AZITHROMYCIN 250 MG/1
500 TABLET, FILM COATED ORAL DAILY
Status: DISCONTINUED | OUTPATIENT
Start: 2022-10-06 | End: 2022-10-07 | Stop reason: HOSPADM

## 2022-10-06 RX ORDER — IPRATROPIUM BROMIDE AND ALBUTEROL SULFATE 2.5; .5 MG/3ML; MG/3ML
3 SOLUTION RESPIRATORY (INHALATION)
Status: DISCONTINUED | OUTPATIENT
Start: 2022-10-06 | End: 2022-10-07 | Stop reason: HOSPADM

## 2022-10-06 RX ORDER — IPRATROPIUM BROMIDE AND ALBUTEROL SULFATE 2.5; .5 MG/3ML; MG/3ML
3 SOLUTION RESPIRATORY (INHALATION)
Status: DISCONTINUED | OUTPATIENT
Start: 2022-10-06 | End: 2022-10-07

## 2022-10-06 RX ORDER — AZITHROMYCIN 500 MG/5ML
500 INJECTION, POWDER, LYOPHILIZED, FOR SOLUTION INTRAVENOUS EVERY 24 HOURS
Status: DISCONTINUED | OUTPATIENT
Start: 2022-10-06 | End: 2022-10-06

## 2022-10-06 RX ADMIN — PREDNISONE 40 MG: 20 TABLET ORAL at 05:02

## 2022-10-06 RX ADMIN — ENOXAPARIN SODIUM 40 MG: 40 INJECTION SUBCUTANEOUS at 18:00

## 2022-10-06 RX ADMIN — NYSTATIN 100000 UNITS: 100000 CREAM TOPICAL at 10:27

## 2022-10-06 RX ADMIN — FAMOTIDINE 20 MG: 20 TABLET, FILM COATED ORAL at 18:00

## 2022-10-06 RX ADMIN — BUDESONIDE AND FORMOTEROL FUMARATE DIHYDRATE 2 PUFF: 80; 4.5 AEROSOL RESPIRATORY (INHALATION) at 07:09

## 2022-10-06 RX ADMIN — OXYCODONE AND ACETAMINOPHEN 1 TABLET: 5; 325 TABLET ORAL at 08:48

## 2022-10-06 RX ADMIN — ARIPIPRAZOLE 30 MG: 10 TABLET ORAL at 05:01

## 2022-10-06 RX ADMIN — IPRATROPIUM BROMIDE AND ALBUTEROL SULFATE 3 ML: .5; 2.5 SOLUTION RESPIRATORY (INHALATION) at 16:08

## 2022-10-06 RX ADMIN — IPRATROPIUM BROMIDE AND ALBUTEROL SULFATE 3 ML: .5; 2.5 SOLUTION RESPIRATORY (INHALATION) at 07:09

## 2022-10-06 RX ADMIN — BUDESONIDE AND FORMOTEROL FUMARATE DIHYDRATE 2 PUFF: 80; 4.5 AEROSOL RESPIRATORY (INHALATION) at 19:03

## 2022-10-06 RX ADMIN — ATORVASTATIN CALCIUM 20 MG: 20 TABLET, FILM COATED ORAL at 20:06

## 2022-10-06 RX ADMIN — MONTELUKAST 10 MG: 10 TABLET, FILM COATED ORAL at 05:02

## 2022-10-06 RX ADMIN — IPRATROPIUM BROMIDE AND ALBUTEROL SULFATE 3 ML: .5; 2.5 SOLUTION RESPIRATORY (INHALATION) at 19:02

## 2022-10-06 RX ADMIN — FAMOTIDINE 20 MG: 20 TABLET, FILM COATED ORAL at 05:02

## 2022-10-06 RX ADMIN — FUROSEMIDE 40 MG: 40 TABLET ORAL at 05:01

## 2022-10-06 RX ADMIN — AZITHROMYCIN MONOHYDRATE 500 MG: 250 TABLET ORAL at 10:27

## 2022-10-06 RX ADMIN — TRAZODONE HYDROCHLORIDE 50 MG: 100 TABLET ORAL at 20:06

## 2022-10-06 RX ADMIN — BENZONATATE 100 MG: 100 CAPSULE ORAL at 08:48

## 2022-10-06 RX ADMIN — IPRATROPIUM BROMIDE AND ALBUTEROL SULFATE 3 ML: .5; 2.5 SOLUTION RESPIRATORY (INHALATION) at 11:09

## 2022-10-06 RX ADMIN — CEFTRIAXONE SODIUM 2 G: 10 INJECTION, POWDER, FOR SOLUTION INTRAVENOUS at 10:27

## 2022-10-06 ASSESSMENT — PATIENT HEALTH QUESTIONNAIRE - PHQ9
6. FEELING BAD ABOUT YOURSELF - OR THAT YOU ARE A FAILURE OR HAVE LET YOURSELF OR YOUR FAMILY DOWN: NOT AL ALL
4. FEELING TIRED OR HAVING LITTLE ENERGY: NOT AT ALL
SUM OF ALL RESPONSES TO PHQ QUESTIONS 1-9: 0
3. TROUBLE FALLING OR STAYING ASLEEP OR SLEEPING TOO MUCH: NOT AT ALL
8. MOVING OR SPEAKING SO SLOWLY THAT OTHER PEOPLE COULD HAVE NOTICED. OR THE OPPOSITE, BEING SO FIGETY OR RESTLESS THAT YOU HAVE BEEN MOVING AROUND A LOT MORE THAN USUAL: NOT AT ALL
1. LITTLE INTEREST OR PLEASURE IN DOING THINGS: NOT AT ALL
7. TROUBLE CONCENTRATING ON THINGS, SUCH AS READING THE NEWSPAPER OR WATCHING TELEVISION: NOT AT ALL
2. FEELING DOWN, DEPRESSED, IRRITABLE, OR HOPELESS: NOT AT ALL
SUM OF ALL RESPONSES TO PHQ9 QUESTIONS 1 AND 2: 0
9. THOUGHTS THAT YOU WOULD BE BETTER OFF DEAD, OR OF HURTING YOURSELF: NOT AT ALL
5. POOR APPETITE OR OVEREATING: NOT AT ALL

## 2022-10-06 ASSESSMENT — COGNITIVE AND FUNCTIONAL STATUS - GENERAL
MOVING FROM LYING ON BACK TO SITTING ON SIDE OF FLAT BED: A LITTLE
SUGGESTED CMS G CODE MODIFIER DAILY ACTIVITY: CK
STANDING UP FROM CHAIR USING ARMS: A LITTLE
MOVING TO AND FROM BED TO CHAIR: A LITTLE
DAILY ACTIVITIY SCORE: 15
SUGGESTED CMS G CODE MODIFIER MOBILITY: CK
HELP NEEDED FOR BATHING: A LOT
DRESSING REGULAR LOWER BODY CLOTHING: A LOT
TURNING FROM BACK TO SIDE WHILE IN FLAT BAD: A LITTLE
DRESSING REGULAR UPPER BODY CLOTHING: A LOT
WALKING IN HOSPITAL ROOM: A LITTLE
TOILETING: A LITTLE
MOBILITY SCORE: 17
CLIMB 3 TO 5 STEPS WITH RAILING: A LOT
PERSONAL GROOMING: A LOT

## 2022-10-06 ASSESSMENT — ENCOUNTER SYMPTOMS
WHEEZING: 0
WEIGHT LOSS: 0
FEVER: 0
LOSS OF CONSCIOUSNESS: 0
SPUTUM PRODUCTION: 1
DIZZINESS: 1
HEADACHES: 0
PALPITATIONS: 0
COUGH: 1
FOCAL WEAKNESS: 0
CHILLS: 0
SHORTNESS OF BREATH: 1
HEMOPTYSIS: 0

## 2022-10-06 ASSESSMENT — LIFESTYLE VARIABLES
DOES PATIENT WANT TO STOP DRINKING: NO
AVERAGE NUMBER OF DAYS PER WEEK YOU HAVE A DRINK CONTAINING ALCOHOL: 0
EVER FELT BAD OR GUILTY ABOUT YOUR DRINKING: NO
TOTAL SCORE: 0
HAVE YOU EVER FELT YOU SHOULD CUT DOWN ON YOUR DRINKING: NO
CONSUMPTION TOTAL: NEGATIVE
ON A TYPICAL DAY WHEN YOU DRINK ALCOHOL HOW MANY DRINKS DO YOU HAVE: 0
HOW MANY TIMES IN THE PAST YEAR HAVE YOU HAD 5 OR MORE DRINKS IN A DAY: 0
HAVE PEOPLE ANNOYED YOU BY CRITICIZING YOUR DRINKING: NO
ALCOHOL_USE: NO
TOTAL SCORE: 0
TOTAL SCORE: 0
EVER HAD A DRINK FIRST THING IN THE MORNING TO STEADY YOUR NERVES TO GET RID OF A HANGOVER: NO

## 2022-10-06 ASSESSMENT — PAIN DESCRIPTION - PAIN TYPE
TYPE: ACUTE PAIN
TYPE: ACUTE PAIN

## 2022-10-06 ASSESSMENT — FIBROSIS 4 INDEX: FIB4 SCORE: 0.98

## 2022-10-06 NOTE — DIETARY
NUTRITION SERVICES: BMI - Pt with BMI >40 (=Body mass index is 54.32 kg/m².), Class III obesity. Weight loss counseling not appropriate in acute care setting. RECOMMEND - If appropriate at DC please refer to outpatient nutrition services for weight management.

## 2022-10-06 NOTE — CARE PLAN
Problem: Pain - Standard  Goal: Alleviation of pain or a reduction in pain to the patient’s comfort goal  Outcome: Progressing   The patient is Watcher - Medium risk of patient condition declining or worsening         Progress made toward(s) clinical / shift goals:  PT denies any pain or need for pain intervention.      Patient is not progressing towards the following goals:

## 2022-10-06 NOTE — PROGRESS NOTES
Pt care assumed.  Pt is aaox4.  Up to bathroom with 1 assist and FWW.  Denies pain.  On 5l NC.  CPAP noc.  PT requests Pur wik, urine output WDL

## 2022-10-06 NOTE — PROGRESS NOTES
[facility] Internal Medicine Daily Progress Note    Date of Service  10/6/2022    [facility] Team: R IM White Team   Attending: Randolph Gutierrez M.d.  Senior Resident: Dr. Rodriguez  Intern:  Dr. Ascencio  Contact Number: [phone]    Chief Complaint  Lorene Haro is a 47 y.o. female admitted 10/4/2022 with Acute on chronic hypercapnic respiratory failure    Hospital Course  This is a 47-year-old female with past medical history of hypertension, dyslipidemia, chronic diastolic heart failure LVEF 65%, KATHIA on CPAP, chronic respiratory failure on 3L secondary to obesity hypoventilation syndrome, asthma, bipolar disorder, morbid obesity with a BMI of 54 and tobacco use disorder who was admitted on 10/4/2022 with acute on chronic respiratory failure with hypercapnia secondary to left lower lobe pneumonia and underlying restrictive airway disease.    Patient reported fever, chills and productive cough, CXR with concerns for pneumonia.  Patient to be treated for hospital-acquired pneumonia as she was recently hospitalized.    Of note patient was recently admitted, 10/1-10/2 for shortness of breath.  Patient underwent IV diuresis for acute on chronic diastolic heart failure exacerbation and underwent steroid treatment for asthma exacerbation.  Echo was performed noted LVEF of 65%.    Interval Problem Update  -No acute events over night  -Still with higher O2 demand.   -Still complains of cough, hoarseness and spit? production.    I have discussed this patient's plan of care and discharge plan at IDT rounds today with Case Management, Nursing, Nursing leadership, and other members of the IDT team.    Consultants/Specialty  N/a    Code Status  Full Code    Disposition  Patient is not medically cleared for discharge.   Anticipate discharge to to home with close outpatient follow-up.  I have placed the appropriate orders for post-discharge needs.    Review of Systems  Review of Systems   Constitutional:  Negative for chills, fever and  weight loss.   Respiratory:  Positive for cough, sputum production and shortness of breath. Negative for hemoptysis and wheezing.    Cardiovascular:  Positive for leg swelling. Negative for chest pain and palpitations.   Neurological:  Positive for dizziness. Negative for focal weakness, loss of consciousness and headaches.      Physical Exam  Temp:  [36.1 °C (97 °F)-36.6 °C (97.8 °F)] 36.6 °C (97.8 °F)  Pulse:  [56-84] 64  Resp:  [11-20] 20  BP: ()/() 99/44  SpO2:  [92 %-98 %] 94 %    Physical Exam  Vitals and nursing note reviewed.   Constitutional:       General: She is not in acute distress.     Appearance: Normal appearance. She is obese. She is ill-appearing. She is not diaphoretic.   HENT:      Head: Normocephalic and atraumatic.      Nose: Nose normal. No rhinorrhea.   Cardiovascular:      Rate and Rhythm: Normal rate and regular rhythm.      Pulses: Normal pulses.      Heart sounds: Normal heart sounds. No murmur heard.  Pulmonary:      Effort: Pulmonary effort is normal. No respiratory distress.      Breath sounds: Wheezing present. No rales.   Chest:      Chest wall: No tenderness.   Abdominal:      General: Abdomen is flat.      Palpations: Abdomen is soft.   Musculoskeletal:         General: No deformity or signs of injury.      Right lower leg: Edema present.      Left lower leg: Edema present.   Skin:     General: Skin is warm.      Coloration: Skin is not jaundiced.      Findings: Lesion present. No erythema.      Comments: Chronic hemosiderin changes noted on b/l LE.    Neurological:      General: No focal deficit present.      Mental Status: She is alert. Mental status is at baseline.   Psychiatric:         Mood and Affect: Mood normal.         Behavior: Behavior normal.         Thought Content: Thought content normal.         Judgment: Judgment normal.       Fluids    Intake/Output Summary (Last 24 hours) at 10/6/2022 1156  Last data filed at 10/6/2022 0848  Gross per 24 hour   Intake  --   Output 1200 ml   Net -1200 ml       Laboratory  Recent Labs     10/04/22  2030   WBC 9.7   RBC 4.89   HEMOGLOBIN 12.9   HEMATOCRIT 41.3   MCV 84.5   MCH 26.4*   MCHC 31.2*   RDW 51.3*   PLATELETCT 288   MPV 11.3     Recent Labs     10/04/22  2030   SODIUM 141   POTASSIUM 3.8   CHLORIDE 97   CO2 30   GLUCOSE 117*   BUN 21   CREATININE 1.03   CALCIUM 10.1                   Imaging  DX-CHEST-PORTABLE (1 VIEW)   Final Result         1.  Left basilar atelectasis or early infiltrate.   2.  Atherosclerosis           Assessment/Plan  Problem Representation:    * Acute on chronic respiratory failure with hypercapnia (HCC)- (present on admission)  Assessment & Plan  Possibly secondary to pneumonia and underlying restrictive airway disease, URI    --O2/RT. Still requiring increased O2 demands  --Duo-nebs, symbicort scheduled  --Prednisone 40 mg po x 5 days   --C/w Advair   --10/22 Echo Ef 65% normal LVF  --C4,vancomycin switch to C3, Azithromycin (pending pro calcitonin negative)    Pneumonia of left lower lobe due to infectious organism  Assessment & Plan  Patient does not look clinically septic. Increased O2 demand, hoarseness, cough. Treating empirically as a pneumonia.    F/u sputum, blood cx  O2/RT protocol  --De-escalating antibiotics to CAP regimen. If procal is negative will consider further de-escalation. Possible URI contributing   -C3 2g x 5 days, Azithromycin 500mg PO x3 days.     HLD (hyperlipidemia)- (present on admission)  Assessment & Plan  Continue lipitor 20mg     Schizophrenia (HCC)- (present on admission)  Assessment & Plan  Continue home abilify 30mg,     Tobacco use- (present on admission)  Assessment & Plan  Reports she quit 2 weeks ago    Morbid obesity with alveolar hypoventilation (HCC)- (present on admission)  Assessment & Plan  Educate on lifestyle and dietary modification     KATHIA (obstructive sleep apnea)- (present on admission)  Assessment & Plan  Nocturnal CPAP       VTE prophylaxis:  enoxaparin ppx    I have performed a physical exam and reviewed and updated ROS and Plan today (10/6/2022). In review of yesterday's note (10/5/2022), there are no changes except as documented above.

## 2022-10-06 NOTE — RESPIRATORY CARE
COPD EDUCATION by COPD CLINICAL EDUCATOR  10/6/2022 at 4:39 PM by Vijaya Grady RRT     Patient reviewed by COPD education team. Patient had a PFT on 9/21/2021 indicating restrictive process without obstruction. Inpatient Pulmonary consult confirms patient likely has Asthma with hypoventilation secondary to obesity. Therefore the patient does not qualify for the COPD program.

## 2022-10-07 VITALS
OXYGEN SATURATION: 90 % | TEMPERATURE: 97 F | BODY MASS INDEX: 51.16 KG/M2 | DIASTOLIC BLOOD PRESSURE: 60 MMHG | HEIGHT: 62 IN | SYSTOLIC BLOOD PRESSURE: 104 MMHG | HEART RATE: 77 BPM | WEIGHT: 278 LBS | RESPIRATION RATE: 16 BRPM

## 2022-10-07 DIAGNOSIS — K21.9 GASTROESOPHAGEAL REFLUX DISEASE, UNSPECIFIED WHETHER ESOPHAGITIS PRESENT: ICD-10-CM

## 2022-10-07 PROBLEM — J96.22 ACUTE ON CHRONIC RESPIRATORY FAILURE WITH HYPERCAPNIA (HCC): Status: RESOLVED | Noted: 2019-11-18 | Resolved: 2022-10-07

## 2022-10-07 LAB
ALBUMIN SERPL BCP-MCNC: 3.7 G/DL (ref 3.2–4.9)
ALBUMIN/GLOB SERPL: 1.5 G/DL
ALP SERPL-CCNC: 83 U/L (ref 30–99)
ALT SERPL-CCNC: 14 U/L (ref 2–50)
ANION GAP SERPL CALC-SCNC: 6 MMOL/L (ref 7–16)
AST SERPL-CCNC: 10 U/L (ref 12–45)
BASOPHILS # BLD AUTO: 0 % (ref 0–1.8)
BASOPHILS # BLD: 0 K/UL (ref 0–0.12)
BILIRUB SERPL-MCNC: 0.4 MG/DL (ref 0.1–1.5)
BUN SERPL-MCNC: 27 MG/DL (ref 8–22)
CALCIUM SERPL-MCNC: 9.2 MG/DL (ref 8.5–10.5)
CHLORIDE SERPL-SCNC: 100 MMOL/L (ref 96–112)
CO2 SERPL-SCNC: 35 MMOL/L (ref 20–33)
CREAT SERPL-MCNC: 0.86 MG/DL (ref 0.5–1.4)
EOSINOPHIL # BLD AUTO: 0.01 K/UL (ref 0–0.51)
EOSINOPHIL NFR BLD: 0.1 % (ref 0–6.9)
ERYTHROCYTE [DISTWIDTH] IN BLOOD BY AUTOMATED COUNT: 52.1 FL (ref 35.9–50)
GFR SERPLBLD CREATININE-BSD FMLA CKD-EPI: 83 ML/MIN/1.73 M 2
GLOBULIN SER CALC-MCNC: 2.5 G/DL (ref 1.9–3.5)
GLUCOSE SERPL-MCNC: 95 MG/DL (ref 65–99)
HCT VFR BLD AUTO: 38.2 % (ref 37–47)
HGB BLD-MCNC: 11.5 G/DL (ref 12–16)
IMM GRANULOCYTES # BLD AUTO: 0.02 K/UL (ref 0–0.11)
IMM GRANULOCYTES NFR BLD AUTO: 0.3 % (ref 0–0.9)
LYMPHOCYTES # BLD AUTO: 1.65 K/UL (ref 1–4.8)
LYMPHOCYTES NFR BLD: 22.8 % (ref 22–41)
MCH RBC QN AUTO: 26.6 PG (ref 27–33)
MCHC RBC AUTO-ENTMCNC: 30.1 G/DL (ref 33.6–35)
MCV RBC AUTO: 88.4 FL (ref 81.4–97.8)
MONOCYTES # BLD AUTO: 0.43 K/UL (ref 0–0.85)
MONOCYTES NFR BLD AUTO: 5.9 % (ref 0–13.4)
NEUTROPHILS # BLD AUTO: 5.14 K/UL (ref 2–7.15)
NEUTROPHILS NFR BLD: 70.9 % (ref 44–72)
NRBC # BLD AUTO: 0 K/UL
NRBC BLD-RTO: 0 /100 WBC
PLATELET # BLD AUTO: 210 K/UL (ref 164–446)
PMV BLD AUTO: 11.6 FL (ref 9–12.9)
POTASSIUM SERPL-SCNC: 4.4 MMOL/L (ref 3.6–5.5)
PROT SERPL-MCNC: 6.2 G/DL (ref 6–8.2)
RBC # BLD AUTO: 4.32 M/UL (ref 4.2–5.4)
SODIUM SERPL-SCNC: 141 MMOL/L (ref 135–145)
WBC # BLD AUTO: 7.3 K/UL (ref 4.8–10.8)

## 2022-10-07 PROCEDURE — 700102 HCHG RX REV CODE 250 W/ 637 OVERRIDE(OP): Performed by: STUDENT IN AN ORGANIZED HEALTH CARE EDUCATION/TRAINING PROGRAM

## 2022-10-07 PROCEDURE — 94640 AIRWAY INHALATION TREATMENT: CPT

## 2022-10-07 PROCEDURE — A9270 NON-COVERED ITEM OR SERVICE: HCPCS

## 2022-10-07 PROCEDURE — 700101 HCHG RX REV CODE 250: Performed by: STUDENT IN AN ORGANIZED HEALTH CARE EDUCATION/TRAINING PROGRAM

## 2022-10-07 PROCEDURE — 700102 HCHG RX REV CODE 250 W/ 637 OVERRIDE(OP)

## 2022-10-07 PROCEDURE — 99239 HOSP IP/OBS DSCHRG MGMT >30: CPT | Mod: GC | Performed by: INTERNAL MEDICINE

## 2022-10-07 PROCEDURE — 700111 HCHG RX REV CODE 636 W/ 250 OVERRIDE (IP): Performed by: STUDENT IN AN ORGANIZED HEALTH CARE EDUCATION/TRAINING PROGRAM

## 2022-10-07 PROCEDURE — A9270 NON-COVERED ITEM OR SERVICE: HCPCS | Performed by: STUDENT IN AN ORGANIZED HEALTH CARE EDUCATION/TRAINING PROGRAM

## 2022-10-07 PROCEDURE — 36415 COLL VENOUS BLD VENIPUNCTURE: CPT

## 2022-10-07 PROCEDURE — A9270 NON-COVERED ITEM OR SERVICE: HCPCS | Performed by: GENERAL PRACTICE

## 2022-10-07 PROCEDURE — 700102 HCHG RX REV CODE 250 W/ 637 OVERRIDE(OP): Performed by: GENERAL PRACTICE

## 2022-10-07 PROCEDURE — 85025 COMPLETE CBC W/AUTO DIFF WBC: CPT

## 2022-10-07 PROCEDURE — 80053 COMPREHEN METABOLIC PANEL: CPT

## 2022-10-07 RX ORDER — AZITHROMYCIN 500 MG/1
500 TABLET, FILM COATED ORAL DAILY
Qty: 2 TABLET | Refills: 0 | Status: SHIPPED | OUTPATIENT
Start: 2022-10-06 | End: 2022-10-08

## 2022-10-07 RX ORDER — FAMOTIDINE 20 MG/1
20 TABLET, FILM COATED ORAL 2 TIMES DAILY
Qty: 60 TABLET | Refills: 0 | Status: SHIPPED | OUTPATIENT
Start: 2022-10-07 | End: 2022-11-13 | Stop reason: SDUPTHER

## 2022-10-07 RX ORDER — FAMOTIDINE 20 MG/1
TABLET, FILM COATED ORAL
Qty: 180 TABLET | OUTPATIENT
Start: 2022-10-07

## 2022-10-07 RX ORDER — PREDNISONE 20 MG/1
40 TABLET ORAL DAILY
Qty: 8 TABLET | Refills: 0 | Status: SHIPPED | OUTPATIENT
Start: 2022-10-07 | End: 2022-10-11

## 2022-10-07 RX ADMIN — ARIPIPRAZOLE 30 MG: 10 TABLET ORAL at 06:14

## 2022-10-07 RX ADMIN — MONTELUKAST 10 MG: 10 TABLET, FILM COATED ORAL at 06:13

## 2022-10-07 RX ADMIN — PREDNISONE 40 MG: 20 TABLET ORAL at 06:13

## 2022-10-07 RX ADMIN — FUROSEMIDE 40 MG: 40 TABLET ORAL at 06:14

## 2022-10-07 RX ADMIN — FAMOTIDINE 20 MG: 20 TABLET, FILM COATED ORAL at 06:13

## 2022-10-07 RX ADMIN — BUDESONIDE AND FORMOTEROL FUMARATE DIHYDRATE 2 PUFF: 80; 4.5 AEROSOL RESPIRATORY (INHALATION) at 08:05

## 2022-10-07 RX ADMIN — OXYCODONE AND ACETAMINOPHEN 1 TABLET: 5; 325 TABLET ORAL at 06:12

## 2022-10-07 RX ADMIN — AZITHROMYCIN MONOHYDRATE 500 MG: 250 TABLET ORAL at 06:13

## 2022-10-07 RX ADMIN — IPRATROPIUM BROMIDE AND ALBUTEROL SULFATE 3 ML: .5; 2.5 SOLUTION RESPIRATORY (INHALATION) at 10:57

## 2022-10-07 RX ADMIN — IPRATROPIUM BROMIDE AND ALBUTEROL SULFATE 3 ML: .5; 2.5 SOLUTION RESPIRATORY (INHALATION) at 08:06

## 2022-10-07 NOTE — DISCHARGE INSTRUCTIONS
Discharge Instructions    Discharged to home by car with relative. Discharged via wheelchair, hospital escort: Yes.  Special equipment needed: Walker    Be sure to schedule a follow-up appointment with your primary care doctor or any specialists as instructed.     Discharge Plan:        I understand that a diet low in cholesterol, fat, and sodium is recommended for good health. Unless I have been given specific instructions below for another diet, I accept this instruction as my diet prescription.       Special Instructions: None    -Is this patient being discharged with medication to prevent blood clots?  No    Is patient discharged on Warfarin / Coumadin?   No

## 2022-10-07 NOTE — PROGRESS NOTES
Pt needing home oxygen. Pt at rest on RA, SpO2=90%. Pt ambulating on RA, SpO2=86%. Pt reporting dyspnea. Pt at rest on 2L NC, SpO2=95%. Pt ambulating on 2L NC, SpO2=93%.

## 2022-10-07 NOTE — CARE PLAN
The patient is Stable - Low risk of patient condition declining or worsening    Shift Goals  Clinical Goals: wean o2, rest  Patient Goals: rest    Progress made toward(s) clinical / shift goals:    Problem: Knowledge Deficit - Standard  Goal: Patient and family/care givers will demonstrate understanding of plan of care, disease process/condition, diagnostic tests and medications  Outcome: Progressing   Pt actively participates in POC. Pt and board updated, all questions and concerns answered. Pt encouraged to voice all questions and concerns.   Problem: Knowledge Deficit - COPD  Goal: Patient/significant other demonstrates understanding of disease process, utilization of the Action Plan, medications and discharge instruction  Outcome: Progressing   Patient understanding tx of COPD  Problem: Respiratory  Goal: Patient will achieve/maintain optimum respiratory ventilation and gas exchange  Outcome: Progressing   Patient on 3lnc, cpap at night but did not tolerate all night. Patient states breathing feels better. Less SOB.   Problem: Pain - Standard  Goal: Alleviation of pain or a reduction in pain to the patient’s comfort goal  Outcome: Progressing   Pt w/o c/o pain this shift.

## 2022-10-07 NOTE — FACE TO FACE
"Face to Face Note  -  Durable Medical Equipment    Nader Ascencio D.O. - NPI: [id number]  I certify that this patient is under my care and that they had a durable medical equipment(DME)face to face encounter by myself that meets the physician DME face-to-face encounter requirements with this patient on:    Date of encounter:   Patient:                    MRN:                       YOB: 2024  Lorene Haro  [id number]  1975     The encounter with the patient was in whole, or in part, for the following medical condition, which is the primary reason for durable medical equipment:  CHF and Asthma    I certify that, based on my findings, the following durable medical equipment is medically necessary:    Oxygen   HOME O2 Saturation Measurements:(Values must be present for Home Oxygen orders)         ,     ,       If patient feels more short of breath, they can go up to 6 liters per minute and contact healthcare provider.    Supporting Symptoms: The patient requires supplemental oxygen, as the following interventions have been tried with limited or no improvement: \"Bronchodilators and/or steroid inhalers and \"Ambulation with oximetry.    My Clinical findings support the need for the above equipment due to:  Hypoxia  "

## 2022-10-07 NOTE — DISCHARGE PLANNING
Care Transition Team Discharge Planning                   Discharge Plan:  I spoke with [facility] regarding a portable tank for transport home. They state her medical prescription for oxygen has  and Medicaid requires a new script yearly for continued oxygen services. The patient has not seen a PCP and this company has been trying to get a hold of her to come and pickup their equipment.   MD notified for new orders and documentation for oxygen.     Patient has been accepted for oxygen services by [facility]. Anticipate delivery before today before 16:30.

## 2022-10-07 NOTE — DISCHARGE SUMMARY
[facility] Internal Medicine Discharge Summary    Attending: Randolph Gutierrez M.d.  Senior Resident: Dr. Rodriguez  Intern:  Dr. Ascencio  Contact Number: [phone]    CHIEF COMPLAINT ON ADMISSION  Chief Complaint   Patient presents with    Shortness of Breath     BIB [facility], increased oxygen needs from 2 liters baseline to 4 liters nasal cannula. Patient has a hoarse voice, cough, and coarse breath sounds. Patient reports feeling poorly since her discharge from the hospital on Sunday.        Reason for Admission  Ems     Admission Date  10/4/2022    CODE STATUS  Full Code    HPI & HOSPITAL COURSE  This is a 47 y.o. female here with acute on chronic respiratory failure.   This is a 47-year-old female with past medical history of hypertension, dyslipidemia, chronic diastolic heart failure LVEF 65%, KATHIA on CPAP, chronic respiratory failure on 3L secondary to obesity hypoventilation syndrome, asthma, bipolar disorder, morbid obesity with a BMI of 54 and tobacco use disorder who was admitted on 10/4/2022 with acute on chronic respiratory failure with hypercapnia secondary to left lower lobe pneumonia and underlying restrictive airway disease.    Patient reported fever, chills and productive cough, CXR with concerns for pneumonia. Patient had a recent asthma exacerbation which likely was likely a contributing factor.  Patient was treated for a hospital-acquired pneumonia initially with cefepime and vancomycin; possible asthma exacerbation was treated with prednisone 40mg PO in addition to inhalers. Due to clinical improvement antibiotics were de-escalated to a CAP regimen of ceftriaxone and azithromycin. At discharge, patient was further de-escalated to a total azithromycin course for CAP of 3 days.   Patient was discharged on previous home respiratory medications of advair, and albuterol. Prednisone to continue for 4 more days post discharge. At time of discharge patient had reached baseline oxygen demands. We had reordered home  oxygen as staff had reported that orders had previously fallen through.    Therefore, she is discharged in good and stable condition to home with close outpatient follow-up.    The patient recovered much more quickly than anticipated on admission.    Discharge Date  10/07/22    Physical Exam on Day of Discharge  Physical Exam  Vitals and nursing note reviewed.   Constitutional:       General: She is not in acute distress.     Appearance: Normal appearance. She is obese. She is not ill-appearing.   HENT:      Head: Normocephalic and atraumatic.      Nose: Nose normal. No rhinorrhea.   Cardiovascular:      Rate and Rhythm: Normal rate and regular rhythm.      Pulses: Normal pulses.      Heart sounds: Normal heart sounds. No murmur heard.  Pulmonary:      Effort: Pulmonary effort is normal. No respiratory distress.      Breath sounds: Wheezing and rales (Mild, present in lower right) present.   Musculoskeletal:         General: Swelling present. No deformity or signs of injury.      Right lower leg: Edema present.      Left lower leg: Edema present.      Comments: Appears to be chronic veinous stasis   Skin:     General: Skin is warm.      Coloration: Skin is not jaundiced.      Findings: Lesion (hemociderin changes noted above.) present. No erythema.   Neurological:      General: No focal deficit present.      Mental Status: She is alert and oriented to person, place, and time. Mental status is at baseline.   Psychiatric:         Mood and Affect: Mood normal.         Behavior: Behavior normal.         Thought Content: Thought content normal.         Judgment: Judgment normal.       FOLLOW UP ITEMS POST DISCHARGE  N/a    DISCHARGE DIAGNOSES  Principal Problem (Resolved):    Acute on chronic respiratory failure with hypercapnia (HCC) POA: Yes  Active Problems:    Pneumonia of left lower lobe due to infectious organism POA: Unknown    KATHIA (obstructive sleep apnea) POA: Yes    Morbid obesity with alveolar hypoventilation  (HCC) POA: Yes    Tobacco use POA: Yes    Schizophrenia (HCC) POA: Yes    HLD (hyperlipidemia) POA: Yes  Resolved Problems:    COPD with acute exacerbation (HCC) POA: Yes      FOLLOW UP  Future Appointments   Date Time Provider Department Center   11/1/2022  1:40 PM Nader Barraza M.D. [facility] None   11/15/2022  4:00 PM CRISTOBAL Sahu [facility]   11/16/2022  2:00 PM CRISTOBAL Azul [facility] None   11/17/2022  1:00 PM Oz Borrego M.D. [facility] None     Heriberto Sharp M.D.  [address] [place] [address] [id number] [phone]    Follow up      MEDICATIONS ON DISCHARGE     Medication List        START taking these medications        Instructions   azithromycin 500 MG tablet  Commonly known as: ZITHROMAX   Take 1 Tablet by mouth every day for 2 days.  Dose: 500 mg            CONTINUE taking these medications        Instructions   aripiprazole 30 MG tablet  Commonly known as: ABILIFY   Take 30 mg by mouth every morning.  Dose: 30 mg     atorvastatin 20 MG Tabs  Commonly known as: LIPITOR   Take 20 mg by mouth at bedtime.  Dose: 20 mg     cyclobenzaprine 5 mg tablet  Commonly known as: Flexeril   Take 1-2 Tablets by mouth 3 times a day as needed for Mild Pain for up to 10 days.  Dose: 5-10 mg     famotidine 20 MG Tabs  Commonly known as: PEPCID   Take 1 Tablet by mouth 2 times a day.  Dose: 20 mg     fluticasone-salmeterol 100-50 MCG/ACT Aepb  Commonly known as: Advair   Inhale 1 Puff every 12 hours.  Dose: 1 Puff     furosemide 40 MG Tabs  Commonly known as: LASIX   Take 1 Tablet by mouth every day for 30 days.  Dose: 40 mg     methocarbamol 750 MG Tabs  Commonly known as: ROBAXIN   Take 1 Tablet by mouth 4 times a day. Take as needed for muscle spasm/strain.  Dose: 750 mg     montelukast 10 MG Tabs  Commonly known as: SINGULAIR   Take 10 mg by mouth every morning.  Dose: 10 mg     nitroglycerin 0.4 MG Subl  Commonly known as: NITROSTAT   Place 1 tablet under the tongue as needed for Chest  Pain.  Dose: 0.4 mg     nystatin 897483 UNIT/GM Crea topical cream  Commonly known as: MYCOSTATIN   Apply 1 Application topically 2 times a day.  Dose: 1 Application     potassium chloride SA 10 MEQ Tbcr  Commonly known as: K-DUR   Doctor's comments: Take with Lasix. Stop taking potassium if you are not taking lasix  Take 1 Tablet by mouth every day for 30 days.  Dose: 10 mEq     predniSONE 20 MG Tabs  Commonly known as: DELTASONE   Take 2 Tablets by mouth every day for 4 days.  Dose: 40 mg     traZODone 50 MG Tabs  Commonly known as: DESYREL   Take 1 Tablet by mouth at bedtime.  Dose: 50 mg     Ventolin  (90 Base) MCG/ACT Aers inhalation aerosol  Generic drug: albuterol   Inhale 2 Puffs every 6 hours as needed for Shortness of Breath.  Dose: 2 Puff              Allergies  Allergies   Allergen Reactions    Penicillin G Rash and Itching     pt reports that she gets a rash all over her body and gets itchy    Amoxicillin Rash and Itching     Tolerates cephalosporins, pt reports that she gets a rash all over her body and gets itchy       DIET  Orders Placed This Encounter   Procedures    Diet Order Diet: Cardiac     Standing Status:   Standing     Number of Occurrences:   1     Order Specific Question:   Diet:     Answer:   Cardiac [6]       ACTIVITY  As tolerated.  Weight bearing as tolerated    CONSULTATIONS  N/a    PROCEDURES  N/a    LABORATORY  Lab Results   Component Value Date    SODIUM 141 10/07/2022    POTASSIUM 4.4 10/07/2022    CHLORIDE 100 10/07/2022    CO2 35 (H) 10/07/2022    GLUCOSE 95 10/07/2022    BUN 27 (H) 10/07/2022    CREATININE 0.86 10/07/2022    CREATININE 0.9 09/11/2008        Lab Results   Component Value Date    WBC 7.3 10/07/2022    HEMOGLOBIN 11.5 (L) 10/07/2022    HEMATOCRIT 38.2 10/07/2022    PLATELETCT 210 10/07/2022        Total time of the discharge process exceeds 45 minutes.

## 2022-10-07 NOTE — DISCHARGE PLANNING
ATTN: Case Management  RE: Referral for Home Health    Reason for referral denial: We are unable to accept today due to insurance capacity - We are rapidly assessing our census for discharge opportunities.  At this time we are only able to accept referrals with SCP insurance.              Unfortunately, we are not able to accept this referral for the reason listed above. If further clarity is needed, our Transitional Care Specialists are available to discuss any barriers to service at [phone].      We look forward to collaborating with you in the future,  [facility] Team

## 2022-10-07 NOTE — DISCHARGE PLANNING
Case Management Discharge Planning    Admission Date: 10/4/2022  GMLOS: 4.1  ALOS: 3    6-Clicks ADL Score: 15  6-Clicks Mobility Score: 17        Anticipated Discharge Dispo: Discharge Disposition: D/T to home under HHA care in anticipation of covered skilled care (06)  Discharge Address: [address] [place], [place] [address] [address]    DME Needed: Yes    DME Ordered: Yes    Action(s) Taken: DC Assessment Complete (See below), Choice obtained, Referral(s) sent, and DME Face to Face     Escalations Completed: None    Medically Clear: Yes    Next Steps: Patient lives alone in a second floor apartment with an elevator. [facility] was her oxygen provider. She has family and friends locally for support. Patient fills her prescriptions at [facility]. Patient has a rollator and shower chair.       Barriers to Discharge: DME        Care Transition Team Assessment    Information Source  Orientation Level: Oriented X4  Information Given By: Patient  Who is responsible for making decisions for patient? : Patient    Readmission Evaluation  Is this a readmission?: No    Elopement Risk  Legal Hold: No  Elopement Risk: Not at Risk for Elopement    Interdisciplinary Discharge Planning  Does Admitting Nurse Feel This Could be a Complex Discharge?: No  Lives with - Patient's Self Care Capacity: Alone and Able to Care For Self  Patient or legal guardian wants to designate a caregiver: No  Support Systems: Family Member(s)  Housing / Facility: 2 Story Apartment / Condo  Do You Take your Prescribed Medications Regularly: Yes  Able to Return to Previous ADL's: Yes  Mobility Issues: Yes  Prior Services: None  Patient Prefers to be Discharged to:: Home with   Assistance Needed: No  Durable Medical Equipment: Walker    Discharge Preparedness  What is your plan after discharge?: Home health care  What are your discharge supports?: Sibling, Parent         Finances  Financial Barriers to Discharge: No  Prescription Coverage: Yes         Values /  Beliefs / Concerns  Values / Beliefs Concerns : No    Advance Directive  Advance Directive?: None  Advance Directive offered?: AD Booklet refused    Domestic Abuse  Have you ever been the victim of abuse or violence?: No  Physical Abuse or Sexual Abuse: No  Verbal Abuse or Emotional Abuse: No  Possible Abuse/Neglect Reported to:: Not Applicable              Anticipated Discharge Information  Discharge Disposition: D/T to home under HHA care in anticipation of covered skilled care (06)  Discharge Address: [address] [place] [address], [place] [address]

## 2022-10-07 NOTE — CARE PLAN
The patient is Stable - Low risk of patient condition declining or worsening    Shift Goals  Clinical Goals: Manage pain, wean O2, covid swab    Progress made toward(s) clinical / shift goals:  Pt A&OX4. VSS. Pt weaned to 3L NC, SpO2=95%. Pt cough more productive this shift, tessalon barrington given X1. Pt OOB for meals. Pt ambulating to bathroom SBA with walker. Covid resulted negative.     Patient is not progressing towards the following goals:      Problem: Risk for Infection - COPD  Goal: Patient will remain free from signs and symptoms of infection  Outcome: Not Progressing     Problem: Ineffective Airway Clearance  Goal: Patient will maintain patent airway with clear/clearing breath sounds  Outcome: Not Progressing     Problem: Impaired Gas Exchange  Goal: Patient will demonstrate improved ventilation and adequate oxygenation and participate in treatment regimen within the level of ability/situation.  Outcome: Not Progressing

## 2022-10-07 NOTE — CARE PLAN
The patient is Stable - Low risk of patient condition declining or worsening    Shift Goals  Clinical Goals: Wean O2, DC planning  Patient Goals: rest    Progress made toward(s) clinical / shift goals:  Pt A&OX4. Pt on baseline 2L NC, SpO2=92%. Pt reporting feeling much better. Pt denying pain. Patient ambulating to bathroom with FWW, SBA. Pt needing O2 for transport.  Home O2 completed, 90% on RA at rest and 86% with exertion. [facility] delivered home oxygen concentrator and tanks.    Pt ready for discharge. PIV removed. AVS reviewed with pt. All belongings taken to brother-in-laws car. Pt discharging to home.

## 2022-10-07 NOTE — FACE TO FACE
Face to Face Supporting Documentation - Home Health    The encounter with this patient was in whole or in part the primary reason for home health admission.    Date of encounter:   Patient:                    MRN:                       YOB: 2024  Lorene Haro  [id number]  1975     Home health to see patient for:  Skilled Nursing care for assessment, interventions & education and Physical Therapy evaluation and treatment    Skilled need for:  Exacerbation of Chronic Disease State reactive airway disease with acute exacerbation and Recent Deterioration of Health Status chronic respiratory failure    Skilled nursing interventions to include:  Comment: safety check, medication management    Homebound status evidenced by:  Need the aid of supportive devices such as crutches, canes, wheelchairs or walkers. Leaving home requires a considerable and taxing effort. There is a normal inability to leave the home.    Community Physician to provide follow up care: Heriberto Sharp M.D.     Optional Interventions? No      I certify the face to face encounter for this home health care referral meets the CMS requirements and the encounter/clinical assessment with the patient was, in whole, or in part, for the medical condition(s) listed above, which is the primary reason for home health care. Based on my clinical findings: the service(s) are medically necessary, support the need for home health care, and the homebound criteria are met.  I certify that this patient has had a face to face encounter by myself.  Nader Ascencio D.O. - RADHAI: [phone]

## 2022-10-07 NOTE — DISCHARGE PLANNING
Received Choice Form @: [id number]  Agency/ Facility Name: [facility]  Referral Sent per Choice Form @: [phone]  GISELE manually faxed [facility] updated progress note and order history

## 2022-10-07 NOTE — CARE PLAN
Problem: Bronchoconstriction  Goal: Improve in air movement and diminished wheezing  Description: Target End Date:  2 to 3 days    1.  Implement inhaled treatments  2.  Evaluate and manage medication effects  Outcome: Progressing  Flowsheets (Taken 10/6/2022 0709)  Bronchodilator Goals/Outcome: Patient at Stable Baseline  Bronchodilator Indications: History / Diagnosis  Note:     Respiratory Update    Treatment modality: Duo/Symbicort  Frequency:QID/BID    Pt tolerating current treatments well with no adverse reactions.

## 2022-10-07 NOTE — DISCHARGE PLANNING
Case Management Discharge Planning    Admission Date: 10/4/2022  GMLOS: 4.1  ALOS: 3    6-Clicks ADL Score: 15  6-Clicks Mobility Score: 17      Anticipated Discharge Dispo: Discharge Disposition: D/T to home under A care in anticipation of covered skilled care (06)  Discharge Address: [address] [place] [address] [place] [address]    DME Needed: Yes    DME Ordered: Yes    Action(s) Taken: Choice obtained, Referral(s) sent, and DME Face to Face     Escalations Completed: None    Medically Clear: Yes    Next Steps: Home health order received. Choice form completed. No accepting home health providers at this time. Notified the patient.     Barriers to Discharge: None

## 2022-10-07 NOTE — RESPIRATORY CARE
REMOTE MONITORING PROGRAM by RESPIRATORY THERAPY  10/7/2022 at 3:18 PM by Sisi Helton, Care Aide           Pt. Declined, not intersted in program.

## 2022-10-09 LAB
BACTERIA BLD CULT: NORMAL
SIGNIFICANT IND 70042: NORMAL
SITE SITE: NORMAL
SOURCE SOURCE: NORMAL

## 2022-10-10 LAB
BACTERIA BLD CULT: NORMAL
SIGNIFICANT IND 70042: NORMAL
SITE SITE: NORMAL
SOURCE SOURCE: NORMAL

## 2022-10-12 ENCOUNTER — APPOINTMENT (OUTPATIENT)
Dept: RADIOLOGY | Facility: MEDICAL CENTER | Age: 47
End: 2022-10-12
Attending: STUDENT IN AN ORGANIZED HEALTH CARE EDUCATION/TRAINING PROGRAM
Payer: MEDICAID

## 2022-10-12 ENCOUNTER — HOSPITAL ENCOUNTER (EMERGENCY)
Facility: MEDICAL CENTER | Age: 47
End: 2022-10-13
Attending: STUDENT IN AN ORGANIZED HEALTH CARE EDUCATION/TRAINING PROGRAM
Payer: MEDICAID

## 2022-10-12 ENCOUNTER — PATIENT OUTREACH (OUTPATIENT)
Dept: HEALTH INFORMATION MANAGEMENT | Facility: OTHER | Age: 47
End: 2022-10-12
Payer: MEDICAID

## 2022-10-12 DIAGNOSIS — J44.9 CHRONIC OBSTRUCTIVE PULMONARY DISEASE, UNSPECIFIED COPD TYPE (HCC): ICD-10-CM

## 2022-10-12 DIAGNOSIS — R07.9 CHEST PAIN, UNSPECIFIED TYPE: ICD-10-CM

## 2022-10-12 LAB
ALBUMIN SERPL BCP-MCNC: 4.2 G/DL (ref 3.2–4.9)
ALBUMIN/GLOB SERPL: 1.4 G/DL
ALP SERPL-CCNC: 110 U/L (ref 30–99)
ALT SERPL-CCNC: 22 U/L (ref 2–50)
ANION GAP SERPL CALC-SCNC: 11 MMOL/L (ref 7–16)
AST SERPL-CCNC: 12 U/L (ref 12–45)
BASOPHILS # BLD AUTO: 0.1 % (ref 0–1.8)
BASOPHILS # BLD: 0.02 K/UL (ref 0–0.12)
BILIRUB SERPL-MCNC: 1 MG/DL (ref 0.1–1.5)
BUN SERPL-MCNC: 14 MG/DL (ref 8–22)
CALCIUM SERPL-MCNC: 9.2 MG/DL (ref 8.5–10.5)
CHLORIDE SERPL-SCNC: 103 MMOL/L (ref 96–112)
CO2 SERPL-SCNC: 29 MMOL/L (ref 20–33)
CREAT SERPL-MCNC: 1.11 MG/DL (ref 0.5–1.4)
EKG IMPRESSION: NORMAL
EKG IMPRESSION: NORMAL
EOSINOPHIL # BLD AUTO: 0.11 K/UL (ref 0–0.51)
EOSINOPHIL NFR BLD: 0.8 % (ref 0–6.9)
ERYTHROCYTE [DISTWIDTH] IN BLOOD BY AUTOMATED COUNT: 53.1 FL (ref 35.9–50)
GFR SERPLBLD CREATININE-BSD FMLA CKD-EPI: 61 ML/MIN/1.73 M 2
GLOBULIN SER CALC-MCNC: 3.1 G/DL (ref 1.9–3.5)
GLUCOSE SERPL-MCNC: 77 MG/DL (ref 65–99)
HCT VFR BLD AUTO: 43.2 % (ref 37–47)
HGB BLD-MCNC: 13 G/DL (ref 12–16)
IMM GRANULOCYTES # BLD AUTO: 0.11 K/UL (ref 0–0.11)
IMM GRANULOCYTES NFR BLD AUTO: 0.8 % (ref 0–0.9)
LYMPHOCYTES # BLD AUTO: 2.56 K/UL (ref 1–4.8)
LYMPHOCYTES NFR BLD: 18.8 % (ref 22–41)
MCH RBC QN AUTO: 26.4 PG (ref 27–33)
MCHC RBC AUTO-ENTMCNC: 30.1 G/DL (ref 33.6–35)
MCV RBC AUTO: 87.8 FL (ref 81.4–97.8)
MONOCYTES # BLD AUTO: 0.96 K/UL (ref 0–0.85)
MONOCYTES NFR BLD AUTO: 7 % (ref 0–13.4)
NEUTROPHILS # BLD AUTO: 9.88 K/UL (ref 2–7.15)
NEUTROPHILS NFR BLD: 72.5 % (ref 44–72)
NRBC # BLD AUTO: 0 K/UL
NRBC BLD-RTO: 0 /100 WBC
PLATELET # BLD AUTO: 298 K/UL (ref 164–446)
PMV BLD AUTO: 11.2 FL (ref 9–12.9)
POTASSIUM SERPL-SCNC: 3.5 MMOL/L (ref 3.6–5.5)
PROT SERPL-MCNC: 7.3 G/DL (ref 6–8.2)
RBC # BLD AUTO: 4.92 M/UL (ref 4.2–5.4)
SODIUM SERPL-SCNC: 143 MMOL/L (ref 135–145)
TROPONIN T SERPL-MCNC: 29 NG/L (ref 6–19)
WBC # BLD AUTO: 13.6 K/UL (ref 4.8–10.8)

## 2022-10-12 PROCEDURE — 85025 COMPLETE CBC W/AUTO DIFF WBC: CPT

## 2022-10-12 PROCEDURE — 71045 X-RAY EXAM CHEST 1 VIEW: CPT

## 2022-10-12 PROCEDURE — 84484 ASSAY OF TROPONIN QUANT: CPT

## 2022-10-12 PROCEDURE — 93005 ELECTROCARDIOGRAM TRACING: CPT | Performed by: STUDENT IN AN ORGANIZED HEALTH CARE EDUCATION/TRAINING PROGRAM

## 2022-10-12 PROCEDURE — 36415 COLL VENOUS BLD VENIPUNCTURE: CPT

## 2022-10-12 PROCEDURE — 93005 ELECTROCARDIOGRAM TRACING: CPT

## 2022-10-12 PROCEDURE — 99284 EMERGENCY DEPT VISIT MOD MDM: CPT

## 2022-10-12 PROCEDURE — 80053 COMPREHEN METABOLIC PANEL: CPT

## 2022-10-12 RX ORDER — KETOROLAC TROMETHAMINE 30 MG/ML
15 INJECTION, SOLUTION INTRAMUSCULAR; INTRAVENOUS ONCE
Status: COMPLETED | OUTPATIENT
Start: 2022-10-12 | End: 2022-10-13

## 2022-10-12 ASSESSMENT — HEART SCORE
HEART SCORE: 3
ECG: NORMAL
HISTORY: SLIGHTLY SUSPICIOUS
AGE: 45-64
RISK FACTORS: 1-2 RISK FACTORS
TROPONIN: 1-3 TIMES NORMAL LIMIT

## 2022-10-12 NOTE — PROGRESS NOTES
CHW spoke to the patient. Pt states that she is doing well after hospital discharge. Pt states that she received all of her medications and as been taking them as prescribed.   Pt is aware of her upcoming appointments and reports not needing any assistance from CCM at this time.     Pt hung up the phone.     CHW will remove the patient from Ccm caseload.

## 2022-10-13 VITALS
DIASTOLIC BLOOD PRESSURE: 45 MMHG | OXYGEN SATURATION: 95 % | TEMPERATURE: 97.6 F | SYSTOLIC BLOOD PRESSURE: 110 MMHG | HEART RATE: 90 BPM | RESPIRATION RATE: 18 BRPM

## 2022-10-13 LAB — TROPONIN T SERPL-MCNC: 28 NG/L (ref 6–19)

## 2022-10-13 PROCEDURE — 700101 HCHG RX REV CODE 250: Performed by: STUDENT IN AN ORGANIZED HEALTH CARE EDUCATION/TRAINING PROGRAM

## 2022-10-13 PROCEDURE — 84484 ASSAY OF TROPONIN QUANT: CPT

## 2022-10-13 PROCEDURE — 94644 CONT INHLJ TX 1ST HOUR: CPT

## 2022-10-13 PROCEDURE — 700111 HCHG RX REV CODE 636 W/ 250 OVERRIDE (IP): Performed by: STUDENT IN AN ORGANIZED HEALTH CARE EDUCATION/TRAINING PROGRAM

## 2022-10-13 PROCEDURE — 96374 THER/PROPH/DIAG INJ IV PUSH: CPT

## 2022-10-13 RX ORDER — ALBUTEROL SULFATE 90 UG/1
2 AEROSOL, METERED RESPIRATORY (INHALATION) EVERY 6 HOURS PRN
Qty: 8.5 G | Refills: 0 | Status: SHIPPED | OUTPATIENT
Start: 2022-10-13 | End: 2022-10-23

## 2022-10-13 RX ORDER — AZITHROMYCIN 250 MG/1
TABLET, FILM COATED ORAL
Qty: 6 TABLET | Refills: 0 | Status: ON HOLD | OUTPATIENT
Start: 2022-10-13 | End: 2022-10-26

## 2022-10-13 RX ORDER — PREDNISONE 50 MG/1
50 TABLET ORAL DAILY
Qty: 5 TABLET | Refills: 0 | Status: ON HOLD | OUTPATIENT
Start: 2022-10-13 | End: 2022-10-26

## 2022-10-13 RX ADMIN — KETOROLAC TROMETHAMINE 15 MG: 30 INJECTION, SOLUTION INTRAMUSCULAR at 00:09

## 2022-10-13 RX ADMIN — IPRATROPIUM BROMIDE 1.5 MG: 0.5 SOLUTION RESPIRATORY (INHALATION) at 00:58

## 2022-10-13 RX ADMIN — ALBUTEROL SULFATE 10 MG: 2.5 SOLUTION RESPIRATORY (INHALATION) at 00:58

## 2022-10-13 NOTE — ED PROVIDER NOTES
CHIEF COMPLAINT  Chief Complaint   Patient presents with    Chest Pain     Pt has pneumonia was in hospital last week for several days and was discharged on Friday, reports has been coughing and has developed chest pain from coughing, deep breathing increases pain       HPI  Lorene Haro is a 47 y.o. female who presents evaluation of a cough and wheezing.  Patient has a history of COPD, CHF, bipolar.  Was recently seen and treated for a COPD exacerbation, and pneumonia was discharged and completed her antibiotics at home.  States she was smoking a cigarette this evening when she had worsening wheezing, cough and shortness of breath.  Did state that the cough began to cause her some mild right-sided chest pain that was worse with movement and inspiration.  It was nonradiating not associate with diaphoresis or nausea.  Denies any known cardiac history.    REVIEW OF SYSTEMS  See HPI for further details. All other systems are negative.     PAST MEDICAL HISTORY   has a past medical history of A-fib ([facility]), Asthma, Bipolar 2 disorder ([facility]), Chickenpox, Hypertension, On home oxygen therapy, Other psychological stress, Schizophrenic disorder ([facility]), and Yeast infection of the skin (04/01/2021).    SOCIAL HISTORY  Social History     Tobacco Use    Smoking status: Former     Packs/day: 0.25     Types: Cigarettes     Quit date: 10/12/2022    Smokeless tobacco: Never   Vaping Use    Vaping Use: Never used   Substance and Sexual Activity    Alcohol use: Never    Drug use: Never    Sexual activity: Not Currently       SURGICAL HISTORY   has a past surgical history that includes hysterectomy laparoscopy; colectomy; cholecystectomy; and knee arthroscopy (Left).    CURRENT MEDICATIONS  Home Medications       Reviewed by Rosalba Bernstein R.N. (Registered Nurse) on 10/12/22 at 2150  Med List Status: Not Addressed     Medication Last Dose Status   albuterol 108 (90 Base) MCG/ACT Aero Soln inhalation aerosol  Active   aripiprazole  (ABILIFY) 30 MG tablet  Active   atorvastatin (LIPITOR) 20 MG Tab  Active   famotidine (PEPCID) 20 MG Tab  Active   fluticasone-salmeterol (ADVAIR) 100-50 MCG/ACT AEROSOL POWDER, BREATH ACTIVATED  Active   furosemide (LASIX) 40 MG Tab  Active   methocarbamol (ROBAXIN) 750 MG Tab  Active   montelukast (SINGULAIR) 10 MG Tab  Active   nitroglycerin (NITROSTAT) 0.4 MG SL Tab  Active   nystatin (MYCOSTATIN) 983912 UNIT/GM Cream topical cream  Active   potassium chloride SA (K-DUR) 10 MEQ Tab CR  Active   traZODone (DESYREL) 50 MG Tab  Active                    ALLERGIES  Allergies   Allergen Reactions    Penicillin G Rash and Itching     pt reports that she gets a rash all over her body and gets itchy    Amoxicillin Rash and Itching     Tolerates cephalosporins, pt reports that she gets a rash all over her body and gets itchy       FAMILY HISTORY  No pertinent family history    PHYSICAL EXAM   /47   Pulse 88   Temp 36.4 °C (97.6 °F) (Temporal)   Resp 15   LMP  (LMP Unknown)   SpO2 98%  @ALISSON[[id number]::@   Pulse ox interpretation: I interpret this pulse ox as normal.  VITALS - vital signs documented prior to this note have been reviewed and noted,  GENERAL - awake, alert, oriented, GCS 15, no apparent distress, non-toxic  appearing  HEENT - normocephalic, atraumatic, pupils equal, sclera anicteric, mucus  membranes moist  NECK - supple, no meningismus, full active range of motion, trachea midline  CARDIOVASCULAR - regular rate/rhythm, no murmurs/gallops/rubs anterior chest wall is tender to palpation on the right  PULMONARY - diffuse expiratory wheezing  GASTROINTESTINAL - soft, non-tender, non-distended, no rebound, guarding,  or peritonitis  GENITOURINARY - Deferred  NEUROLOGIC - Awake alert, normal mental status, speech fluid, cognition  normal, moves all extremities  MUSCULOSKELETAL - no obvious asymmetry or deformities present  EXTREMITIES - warm, well-perfused, no cyanosis or significant edema  DERMATOLOGIC  - warm, dry, no rashes, no jaundice  PSYCHIATRIC - normal affect, normal insight, normal concentration            EKG    Sinus rhythm  Low voltage, precordial leads  Baseline wander in lead(s) V1,V2,V3  Compared to ECG 10/12/2022 21:48:56  Ectopic atrial rhythm no longer present  Right-axis deviation no longer present  T-wave abnormality no longer present  Possible ischemia no longer present  Normal axis no STEMI pattern normal RI QRS and QTc interval    LABS      Labs Reviewed   CBC WITH DIFFERENTIAL - Abnormal; Notable for the following components:       Result Value    WBC 13.6 (*)     MCH 26.4 (*)     MCHC 30.1 (*)     RDW 53.1 (*)     Neutrophils-Polys 72.50 (*)     Lymphocytes 18.80 (*)     Neutrophils (Absolute) 9.88 (*)     Monos (Absolute) 0.96 (*)     All other components within normal limits    Narrative:     Biotin intake of greater than 5 mg per day may interfere with  troponin levels, causing false low values.   COMP METABOLIC PANEL - Abnormal; Notable for the following components:    Potassium 3.5 (*)     Alkaline Phosphatase 110 (*)     All other components within normal limits    Narrative:     Biotin intake of greater than 5 mg per day may interfere with  troponin levels, causing false low values.   TROPONIN - Abnormal; Notable for the following components:    Troponin T 29 (*)     All other components within normal limits    Narrative:     Biotin intake of greater than 5 mg per day may interfere with  troponin levels, causing false low values.   TROPONIN - Abnormal; Notable for the following components:    Troponin T 28 (*)     All other components within normal limits    Narrative:     Biotin intake of greater than 5 mg per day may interfere with  troponin levels, causing false low values.   ESTIMATED GFR    Narrative:     Biotin intake of greater than 5 mg per day may interfere with  troponin levels, causing false low values.         Pertinent Labs & Imaging studies reviewed. (See chart for  details)    RADIOLOGY  DX-CHEST-PORTABLE (1 VIEW)   Final Result      LEFT basilar atelectasis or pneumonia, similar to prior                ED COURSE/PROCEDURES      Reevaluation at 0148 patient is sleeping resting comfortably no acute distress saturating well on her baseline 2 L with no residual wheezing.    Medications   albuterol (PROVENTIL) 2.5mg/0.5ml nebulizer solution 10 mg (10 mg Nebulization Given 10/13/22 0058)   ipratropium (ATROVENT) 0.02 % nebulizer solution 1.5 mg (1.5 mg Nebulization Given 10/13/22 0058)   ketorolac (TORADOL) injection 15 mg (15 mg Intravenous Given 10/13/22 0009)               MEDICAL DECISION MAKING    Patient presented for evaluation of chest pain shortness of breath and wheezing that got worse after smoking a cigarette at home.  Differential initial included was not limited to  COPD exacerbation pneumonia CHF exacerbation pleural effusion pneumothorax among many other considerations.  No significant hypoxia tachycardia lower extremity swelling lower concern for underlying pulmonary embolism.  Does have diffuse expiratory wheezing feel this most likely represents an underlying COPD exacerbation secondary to the patient's tobacco use this evening exacerbating her underlying COPD. patient has a history of chronic  hypoxic respiratory failure and a room air sat is 94%.  Pain is reproducible on examination EKG is nonischemic mildly elevated troponin which is near her baseline delta troponin is unchanged though despite the mild troponin elevation currently her heart score still is 3.  Low risk for Mace.  Pain is reproducible on examination improved after Toradol may be a component of costochondritis versus pleuritis. Patient's wheezing improved after DuoNeb treatment, she was back on her home 2 L of oxygen.  Chest x-ray shows a stable infiltrate in the right lower lobe.  Does have a mild leukocytosis though did recently completed a course of steroids, may be a demargination secondary  to this.  Given the patient is back to her baseline oxygenation wheezing is resolved, I do believe she is appropriate for outpatient management.  She will be discharged with a short course of steroids and azithromycin for suspected COPD exacerbation.      FINAL IMPRESSION  1.  COPD exacerbation  2.  Chest pain  3.  leukocytosis   #4 tobacco use disorder        Electronically signed by: Familia Herron D.O., 10/12/2022 11:32 PM      Dictation Disclaimer  Please note this report has been produced using speech recognition software and  may contain errors related to that system, including errors seen in grammar,  punctuation and spelling, as well as words and phrases that may be inappropriate.  If there are any questions or concerns, please feel free to contact the dictating  physician for clarification.

## 2022-10-13 NOTE — ED NOTES
Patient discharged in stable condition per orders. IV access removed - bandage applied. Wristband removed per protocol. Patient verbalized understanding of all discharge instructions. All belongings accounted for. Out with [facility] on oxygen.

## 2022-10-13 NOTE — DISCHARGE PLANNING
Medical Social Work     KETTY called [facility] and spoke with Dee and obtained an authorization for transport.     KETTY faxed a PCS form to [facility] and set up transport with [facility] for 0330. RN aware of transport time.

## 2022-10-13 NOTE — ED NOTES
Report to Aliza GREENE, assuming care of pt.    Pt boosted in [facility] for comfort, able to reposition self. RT contacted for neb tx.

## 2022-10-13 NOTE — ED TRIAGE NOTES
Chief Complaint   Patient presents with   • Chest Pain     Pt has pneumonia was in hospital last week for several days and was discharged on Friday, reports has been coughing and has developed chest pain from coughing, deep breathing increases pain        Pain is across chest describes as sharp, no radiation.  Reports nausea but no vomiting.      Pt to triage via wheelchair for above complaint.  Uses walker.       Pt is alert/oriented and follows commands. Pt speaking in full sentences and responds appropriately to questions. No acute distress noted in triage and respirations are even and unlabored.      Pt placed in lobby and educated on triage process. Pt encouraged to alert staff for any changes in condition

## 2022-10-13 NOTE — ED NOTES
Pt concerned with going home without her oxygen, per pt [facility] left oxygen at home and she is normally on 2lpm NC at baseline. Pt taken off oxygen to determine baseline saturations, pt dropping to 84% at lowest and sustaining 86-90%. Oxygen placed back on pt, ERP notified, SW contacted.

## 2022-10-14 ENCOUNTER — APPOINTMENT (OUTPATIENT)
Dept: RADIOLOGY | Facility: MEDICAL CENTER | Age: 47
End: 2022-10-14
Attending: EMERGENCY MEDICINE
Payer: MEDICAID

## 2022-10-14 ENCOUNTER — HOSPITAL ENCOUNTER (EMERGENCY)
Facility: MEDICAL CENTER | Age: 47
End: 2022-10-14
Attending: EMERGENCY MEDICINE
Payer: MEDICAID

## 2022-10-14 VITALS
HEIGHT: 62 IN | RESPIRATION RATE: 20 BRPM | DIASTOLIC BLOOD PRESSURE: 65 MMHG | HEART RATE: 67 BPM | OXYGEN SATURATION: 94 % | SYSTOLIC BLOOD PRESSURE: 146 MMHG | TEMPERATURE: 98.4 F | BODY MASS INDEX: 51.16 KG/M2 | WEIGHT: 278 LBS

## 2022-10-14 DIAGNOSIS — R06.02 SOB (SHORTNESS OF BREATH): Primary | ICD-10-CM

## 2022-10-14 DIAGNOSIS — R06.2 WHEEZING: ICD-10-CM

## 2022-10-14 DIAGNOSIS — F17.200 SMOKING: ICD-10-CM

## 2022-10-14 DIAGNOSIS — R05.1 ACUTE COUGH: ICD-10-CM

## 2022-10-14 LAB
ALBUMIN SERPL BCP-MCNC: 4.3 G/DL (ref 3.2–4.9)
ALBUMIN/GLOB SERPL: 1.4 G/DL
ALP SERPL-CCNC: 112 U/L (ref 30–99)
ALT SERPL-CCNC: 24 U/L (ref 2–50)
ANION GAP SERPL CALC-SCNC: 12 MMOL/L (ref 7–16)
AST SERPL-CCNC: 14 U/L (ref 12–45)
BASE EXCESS BLDV CALC-SCNC: 2 MMOL/L
BASOPHILS # BLD AUTO: 0.1 % (ref 0–1.8)
BASOPHILS # BLD: 0.01 K/UL (ref 0–0.12)
BILIRUB SERPL-MCNC: 1.4 MG/DL (ref 0.1–1.5)
BODY TEMPERATURE: 36.8 CENTIGRADE
BUN SERPL-MCNC: 10 MG/DL (ref 8–22)
CALCIUM SERPL-MCNC: 9.5 MG/DL (ref 8.5–10.5)
CHLORIDE SERPL-SCNC: 102 MMOL/L (ref 96–112)
CO2 SERPL-SCNC: 27 MMOL/L (ref 20–33)
COMMENT 1642: NORMAL
CREAT SERPL-MCNC: 0.97 MG/DL (ref 0.5–1.4)
EKG IMPRESSION: NORMAL
EOSINOPHIL # BLD AUTO: 0.09 K/UL (ref 0–0.51)
EOSINOPHIL NFR BLD: 0.7 % (ref 0–6.9)
ERYTHROCYTE [DISTWIDTH] IN BLOOD BY AUTOMATED COUNT: 52.1 FL (ref 35.9–50)
GFR SERPLBLD CREATININE-BSD FMLA CKD-EPI: 72 ML/MIN/1.73 M 2
GLOBULIN SER CALC-MCNC: 3 G/DL (ref 1.9–3.5)
GLUCOSE SERPL-MCNC: 110 MG/DL (ref 65–99)
HCO3 BLDV-SCNC: 28 MMOL/L (ref 24–28)
HCT VFR BLD AUTO: 43.2 % (ref 37–47)
HGB BLD-MCNC: 12.9 G/DL (ref 12–16)
HYPOCHROMIA BLD QL SMEAR: ABNORMAL
IMM GRANULOCYTES # BLD AUTO: 0.13 K/UL (ref 0–0.11)
IMM GRANULOCYTES NFR BLD AUTO: 1.1 % (ref 0–0.9)
LYMPHOCYTES # BLD AUTO: 2.45 K/UL (ref 1–4.8)
LYMPHOCYTES NFR BLD: 20.3 % (ref 22–41)
MCH RBC QN AUTO: 26 PG (ref 27–33)
MCHC RBC AUTO-ENTMCNC: 29.9 G/DL (ref 33.6–35)
MCV RBC AUTO: 86.9 FL (ref 81.4–97.8)
MONOCYTES # BLD AUTO: 0.7 K/UL (ref 0–0.85)
MONOCYTES NFR BLD AUTO: 5.8 % (ref 0–13.4)
MORPHOLOGY BLD-IMP: NORMAL
NEUTROPHILS # BLD AUTO: 8.68 K/UL (ref 2–7.15)
NEUTROPHILS NFR BLD: 72 % (ref 44–72)
NRBC # BLD AUTO: 0 K/UL
NRBC BLD-RTO: 0 /100 WBC
OVALOCYTES BLD QL SMEAR: NORMAL
PCO2 BLDV: 49.6 MMHG (ref 41–51)
PH BLDV: 7.37 [PH] (ref 7.31–7.45)
PLATELET # BLD AUTO: 250 K/UL (ref 164–446)
PLATELET BLD QL SMEAR: NORMAL
PMV BLD AUTO: 11.1 FL (ref 9–12.9)
PO2 BLDV: 26.3 MMHG (ref 25–40)
POIKILOCYTOSIS BLD QL SMEAR: NORMAL
POTASSIUM SERPL-SCNC: 3.8 MMOL/L (ref 3.6–5.5)
PROT SERPL-MCNC: 7.3 G/DL (ref 6–8.2)
RBC # BLD AUTO: 4.97 M/UL (ref 4.2–5.4)
RBC BLD AUTO: PRESENT
SAO2 % BLDV: 58.7 %
SODIUM SERPL-SCNC: 141 MMOL/L (ref 135–145)
WBC # BLD AUTO: 12.1 K/UL (ref 4.8–10.8)

## 2022-10-14 PROCEDURE — 93005 ELECTROCARDIOGRAM TRACING: CPT | Performed by: EMERGENCY MEDICINE

## 2022-10-14 PROCEDURE — 99285 EMERGENCY DEPT VISIT HI MDM: CPT

## 2022-10-14 PROCEDURE — 36415 COLL VENOUS BLD VENIPUNCTURE: CPT

## 2022-10-14 PROCEDURE — 80053 COMPREHEN METABOLIC PANEL: CPT

## 2022-10-14 PROCEDURE — 71045 X-RAY EXAM CHEST 1 VIEW: CPT

## 2022-10-14 PROCEDURE — 700101 HCHG RX REV CODE 250: Performed by: EMERGENCY MEDICINE

## 2022-10-14 PROCEDURE — 94640 AIRWAY INHALATION TREATMENT: CPT

## 2022-10-14 PROCEDURE — 82803 BLOOD GASES ANY COMBINATION: CPT

## 2022-10-14 PROCEDURE — 85025 COMPLETE CBC W/AUTO DIFF WBC: CPT

## 2022-10-14 RX ORDER — IPRATROPIUM BROMIDE AND ALBUTEROL SULFATE 2.5; .5 MG/3ML; MG/3ML
3 SOLUTION RESPIRATORY (INHALATION)
Status: DISCONTINUED | OUTPATIENT
Start: 2022-10-14 | End: 2022-10-14 | Stop reason: HOSPADM

## 2022-10-14 RX ORDER — IPRATROPIUM BROMIDE AND ALBUTEROL SULFATE 2.5; .5 MG/3ML; MG/3ML
3 SOLUTION RESPIRATORY (INHALATION) ONCE
Status: COMPLETED | OUTPATIENT
Start: 2022-10-14 | End: 2022-10-14

## 2022-10-14 RX ADMIN — IPRATROPIUM BROMIDE AND ALBUTEROL SULFATE 3 ML: .5; 2.5 SOLUTION RESPIRATORY (INHALATION) at 04:45

## 2022-10-14 ASSESSMENT — FIBROSIS 4 INDEX: FIB4 SCORE: 0.42

## 2022-10-14 NOTE — ED PROVIDER NOTES
"ED Provider Note      Means of Arrival: EMS  History obtained from: Patient    CHIEF COMPLAINT  Chief Complaint   Patient presents with    Shortness of Breath    Wheezing       [facility]  Lorene Haro is a 47 y.o. female past medical history of chronic respiratory failure on 3L home O2, COPD currently smoking, asthma, A. fib, bipolar hypertension who presents with continued shortness of breath and wheeze after being diagnosed with COPD exacerbation. Patient had been admitted 10/1/2022 for chest pain and shortness of breath, 2D echo showed LVEF of 65%, she was treated asthma exacerbation and CHF exacerbation and discharged.  She returned for another admit 10/4/2022 for the same symptoms and treated for PNA, discharged once stable. She returned to the ER 10/12/2022 for worseing symptoms after smoking a cigarette and dischaged home with albuterol inhalers and prednisone.  She is returning today and she smoked another cigarette and her symptoms worsened again.  Patient denies significant chest pain, however, complains of shortness of breath and wheezing.  She tells me that she smoked \"just one\" cigarette.  Patient denies nausea, vomiting, abdominal pain, chest pain, lower extremity pain, syncope or lightheadedness.  She does complain of upper back pain when she coughs.       REVIEW OF SYSTEMS  CONSTITUTIONAL:  No fever.  CARDIOVASCULAR:  No chest discomfort.  RESPIRATORY:  wheeze, shortness of breath, cough  GASTROINTESTINAL:  No abdominal pain.  GENITOURINARY:   No dysuria.  MUSCULOSKELETAL:  No arthralgia.  SKIN:  No rash or suspicious lesions.  NEUROLOGIC:   No headache.    See HPI for further details.   All other systems are negative.     PAST MEDICAL HISTORY  Past Medical History:   Diagnosis Date    A-fib (HCC)     Asthma     Bipolar 2 disorder (HCC)     Chickenpox     Hypertension     On home oxygen therapy     Other psychological stress     Schizophrenic disorder (HCC)     Yeast infection of the skin 04/01/2021 " "      FAMILY HISTORY  Family History   Problem Relation Age of Onset    No Known Problems Mother     Cancer Sister        SOCIAL HISTORY   reports that she quit smoking 2 days ago. Her smoking use included cigarettes. She smoked an average of .25 packs per day. She has never used smokeless tobacco. She reports that she does not drink alcohol and does not use drugs.    SURGICAL HISTORY  Past Surgical History:   Procedure Laterality Date    CHOLECYSTECTOMY      COLECTOMY      HYSTERECTOMY LAPAROSCOPY      KNEE ARTHROSCOPY Left        CURRENT MEDICATIONS  Home Medications       Reviewed by Aliza Stout R.N. (Registered Nurse) on 10/14/22 at 0234  Med List Status: Partial     Medication Last Dose Status   albuterol 108 (90 Base) MCG/ACT Aero Soln inhalation aerosol  Active   albuterol 108 (90 Base) MCG/ACT Aero Soln inhalation aerosol  Active   aripiprazole (ABILIFY) 30 MG tablet  Active   atorvastatin (LIPITOR) 20 MG Tab  Active   azithromycin (ZITHROMAX) 250 MG Tab  Active   famotidine (PEPCID) 20 MG Tab  Active   fluticasone-salmeterol (ADVAIR) 100-50 MCG/ACT AEROSOL POWDER, BREATH ACTIVATED  Active   furosemide (LASIX) 40 MG Tab  Active   methocarbamol (ROBAXIN) 750 MG Tab  Active   montelukast (SINGULAIR) 10 MG Tab  Active   nitroglycerin (NITROSTAT) 0.4 MG SL Tab  Active   nystatin (MYCOSTATIN) 661772 UNIT/GM Cream topical cream  Active   potassium chloride SA (K-DUR) 10 MEQ Tab CR  Active   predniSONE (DELTASONE) 50 MG Tab  Active   traZODone (DESYREL) 50 MG Tab  Active                    ALLERGIES  Allergies   Allergen Reactions    Penicillin G Rash and Itching     pt reports that she gets a rash all over her body and gets itchy    Amoxicillin Rash and Itching     Tolerates cephalosporins, pt reports that she gets a rash all over her body and gets itchy       PHYSICAL EXAM  VITAL SIGNS: /60   Pulse 94   Temp 36.8 °C (98.2 °F) (Temporal)   Resp 20   Ht 1.575 m (5' 2\")   Wt (!) 126 kg (278 lb)  "  LMP  (LMP Unknown)   SpO2 92%   BMI 50.85 kg/m²    Gen: Alert, no acute distress, obese habitus  HENT: ATNC  Eyes: Normal conjunctiva, PERRL, EOMI  Neck: trachea midline, no JVD  Resp: frequent cough, expiratory wheezing bilaterally, 92% on room air  CV: No JVD, RRR  Abd: non-distended  Ext: No deformities, no calf swelling/pain/palpable cord  Psych: normal mood  Neuro: speech fluent       RADIOLOGY/PROCEDURES  DX-CHEST-PORTABLE (1 VIEW)   Final Result      Prominent mediastinal fat, less likely LEFT basilar atelectasis or pneumonia          LABS  Labs Reviewed   CBC WITH DIFFERENTIAL - Abnormal; Notable for the following components:       Result Value    WBC 12.1 (*)     MCH 26.0 (*)     MCHC 29.9 (*)     RDW 52.1 (*)     Lymphocytes 20.30 (*)     Immature Granulocytes 1.10 (*)     Neutrophils (Absolute) 8.68 (*)     Immature Granulocytes (abs) 0.13 (*)     All other components within normal limits   COMP METABOLIC PANEL - Abnormal; Notable for the following components:    Glucose 110 (*)     Alkaline Phosphatase 112 (*)     All other components within normal limits   ESTIMATED GFR   PERIPHERAL SMEAR REVIEW   PLATELET ESTIMATE   MORPHOLOGY   DIFFERENTIAL COMMENT   VENOUS BLOOD GAS        EKG  Results for orders placed or performed during the hospital encounter of 10/14/22   EKG   Result Value Ref Range    Report       [facility] Emergency Dept.    Test Date:  2022-10-14  Pt Name:    ADELINA MILLAN                Department: ER  MRN:        [id number]                      Room:       [facility]  Gender:     Female                       Technician: [id number]  :        1975                   Requested By:ER TRIAGE PROTOCOL  Order #:    [id number]                    Reading MD:    Measurements  Intervals                                Axis  Rate:       81                           P:          36  NV:         129                          QRS:        48  QRSD:       102                          T:           -1  QT:         380  QTc:        441    Interpretive Statements  Sinus rhythm  Atrial premature complexes  Probable left atrial enlargement  Low voltage, precordial leads  Borderline T abnormalities, diffuse leads  Compared to ECG 10/12/2022 21:57:11  Atrial premature complex(es) now present  T-wave abnormality now present          COURSE & MEDICAL DECISION MAKING  Pertinent Labs & Imaging studies reviewed. (See chart for details)    Lorene Haro is a 47 y.o. female past medical history of chronic respiratory failure on 3L home O2, COPD currently smoking, asthma, A. fib, bipolar hypertension who presents with continued shortness of breath and wheeze after being diagnosed with COPD exacerbation. PNA, CHF, PE, pneumothorax, COPD, asthma, COVID, Flu, viral syndrome, pleural, effusion, malignancy, foreign body, anemia, arrhythmia, CO poisoning, chemical exposure, anxiety, muscular disorder    Patient is afebrile, satting 91% on room air, frequent cough upon initial ED arrival with bilateral expiratory wheezing.  Patient is given DuoNeb and Tessalon Perles with improvement of her symptoms.  Oxygen remains within normal limits on room air, however, she is supposed to be on 3 L nasal cannula constantly.  Chest x-ray with prominent mediastinal fat, less likely left basilar atelectasis or pneumonia.  Patient does have mild leukocytosis at 12.1, however, she is also been on prednisone.  VBG without concern for CO2 retention.  EKG without acute ischemic changes.  Patient is informed of results.  She ambulated without difficulty and her pulse ox with ambulation remained 94%.  She is instructed to not smoke any cigarettes as this has now caused her symptoms to worsen twice.  She is educated on cessation for smoking.  Encouraged to follow with a primary care provider.  Given strict ER return precautions.  She is comfortable to plan with outpatient management discharged in good condition to continue the prednisone  prescribed to her on her last visit.    Appropriate PPE were worn at this encounter.     FINAL IMPRESSION  1. SOB (shortness of breath) Acute   2. Acute cough Acute   3. Wheezing Acute   4. Smoking Chronic     The patient will return for new or worsening symptoms and is stable at the time of discharge.    The patient is referred to a primary physician for blood pressure management, diabetic screening, and for all other preventative health concerns.    DISPOSITION:  Patient will be discharged home in stable condition.    FOLLOW UP:  Heriberto Sharp M.D.  [address] [place] [id number] [phone]      call today to schedule follow up for your shortness of breath, COPD, and for help quiting cigarettes    [facility], Emergency Dept  [address] [facility] [address] [phone]    As needed, If symptoms worsen      OUTPATIENT MEDICATIONS:  New Prescriptions    No medications on file         This dictation was created using voice recognition software. The accuracy of the dictation is limited to the abilities of the software. I expect there may be some errors of grammar and possibly content. The nursing notes were reviewed and certain aspects of this information were incorporated into this note.

## 2022-10-14 NOTE — DISCHARGE INSTRUCTIONS
Stop smoking cigarettes, especially during this time of COPD exacerbation. Try nicotine patches and follow up with your primary care physician for further help with smoking cessation.     Continue the steroid prescription given to you yesterday and use the inhalers as needed for wheezing.

## 2022-10-14 NOTE — ED NOTES
Assumed pt care. Pt resting on [facility]. Awaiting [facility] transport around 0830

## 2022-10-14 NOTE — ED NOTES
Patient ambulatory with walker down the blanchard and back to room, pulse ox sat 90-95% on room air. Notified ERP.

## 2022-10-14 NOTE — DISCHARGE PLANNING
Medical Social Work    KETTY called [facility] and obtained a trip authorization.     KETTY faxed a PCS form to [facility] and set up transport  [place] for 0315.

## 2022-10-14 NOTE — ED TRIAGE NOTES
"Chief Complaint   Patient presents with    Shortness of Breath    Wheezing       BIB EMS to [address], pt on monitor and in gown, labs drawn and sent. Pt coming in from home, pt was seen here yesterday for SOB and chest pain. Pt reports that she went home and took her medications that were prescribed and still does not feel better. Pt C/O SOB and wheezing. Reports her upper back hurts due to her SOB. Pt reports this has been happening for four hours. Pt also c/o a cough. Hx of COPD. Pt was administered a duoneb PTA by EMS. GCS 15.     Medications given en route:Nebulizer    /64   Pulse 96   Temp 36.8 °C (98.2 °F) (Temporal)   Resp (!) 24   Ht 1.575 m (5' 2\")   Wt (!) 126 kg (278 lb)   LMP  (LMP Unknown)   SpO2 96%   BMI 50.85 kg/m²    "

## 2022-10-14 NOTE — ED NOTES
Called KETTY, pt reports she lives on the second floor and reports she did not bring her oxygen tank. SW setting up transport for patient back to her house.

## 2022-10-16 ENCOUNTER — APPOINTMENT (OUTPATIENT)
Dept: RADIOLOGY | Facility: MEDICAL CENTER | Age: 47
End: 2022-10-16
Attending: EMERGENCY MEDICINE
Payer: MEDICAID

## 2022-10-16 ENCOUNTER — APPOINTMENT (OUTPATIENT)
Dept: RADIOLOGY | Facility: MEDICAL CENTER | Age: 47
End: 2022-10-16
Payer: MEDICAID

## 2022-10-16 ENCOUNTER — HOSPITAL ENCOUNTER (EMERGENCY)
Facility: MEDICAL CENTER | Age: 47
End: 2022-10-16
Attending: EMERGENCY MEDICINE
Payer: MEDICAID

## 2022-10-16 VITALS
SYSTOLIC BLOOD PRESSURE: 121 MMHG | OXYGEN SATURATION: 95 % | RESPIRATION RATE: 22 BRPM | HEIGHT: 62 IN | HEART RATE: 66 BPM | WEIGHT: 282.19 LBS | BODY MASS INDEX: 51.93 KG/M2 | DIASTOLIC BLOOD PRESSURE: 74 MMHG | TEMPERATURE: 97.3 F

## 2022-10-16 DIAGNOSIS — J44.1 ACUTE EXACERBATION OF CHRONIC OBSTRUCTIVE PULMONARY DISEASE (COPD) (HCC): ICD-10-CM

## 2022-10-16 DIAGNOSIS — M54.32 SCIATICA OF LEFT SIDE: ICD-10-CM

## 2022-10-16 LAB
ALBUMIN SERPL BCP-MCNC: 4.4 G/DL (ref 3.2–4.9)
ALBUMIN/GLOB SERPL: 1.3 G/DL
ALP SERPL-CCNC: 127 U/L (ref 30–99)
ALT SERPL-CCNC: 21 U/L (ref 2–50)
ANION GAP SERPL CALC-SCNC: 10 MMOL/L (ref 7–16)
AST SERPL-CCNC: 12 U/L (ref 12–45)
BASOPHILS # BLD AUTO: 0.1 % (ref 0–1.8)
BASOPHILS # BLD: 0.01 K/UL (ref 0–0.12)
BILIRUB SERPL-MCNC: 0.8 MG/DL (ref 0.1–1.5)
BLOOD CULTURE HOLD CXBCH: NORMAL
BUN SERPL-MCNC: 12 MG/DL (ref 8–22)
CALCIUM SERPL-MCNC: 10 MG/DL (ref 8.5–10.5)
CHLORIDE SERPL-SCNC: 102 MMOL/L (ref 96–112)
CO2 SERPL-SCNC: 30 MMOL/L (ref 20–33)
CREAT SERPL-MCNC: 1 MG/DL (ref 0.5–1.4)
EKG IMPRESSION: NORMAL
EOSINOPHIL # BLD AUTO: 0.08 K/UL (ref 0–0.51)
EOSINOPHIL NFR BLD: 0.6 % (ref 0–6.9)
ERYTHROCYTE [DISTWIDTH] IN BLOOD BY AUTOMATED COUNT: 53 FL (ref 35.9–50)
GFR SERPLBLD CREATININE-BSD FMLA CKD-EPI: 69 ML/MIN/1.73 M 2
GLOBULIN SER CALC-MCNC: 3.3 G/DL (ref 1.9–3.5)
GLUCOSE SERPL-MCNC: 114 MG/DL (ref 65–99)
HCT VFR BLD AUTO: 44.5 % (ref 37–47)
HGB BLD-MCNC: 13.4 G/DL (ref 12–16)
IMM GRANULOCYTES # BLD AUTO: 0.07 K/UL (ref 0–0.11)
IMM GRANULOCYTES NFR BLD AUTO: 0.6 % (ref 0–0.9)
LYMPHOCYTES # BLD AUTO: 2.07 K/UL (ref 1–4.8)
LYMPHOCYTES NFR BLD: 16.7 % (ref 22–41)
MCH RBC QN AUTO: 26.3 PG (ref 27–33)
MCHC RBC AUTO-ENTMCNC: 30.1 G/DL (ref 33.6–35)
MCV RBC AUTO: 87.3 FL (ref 81.4–97.8)
MONOCYTES # BLD AUTO: 0.74 K/UL (ref 0–0.85)
MONOCYTES NFR BLD AUTO: 6 % (ref 0–13.4)
NEUTROPHILS # BLD AUTO: 9.41 K/UL (ref 2–7.15)
NEUTROPHILS NFR BLD: 76 % (ref 44–72)
NRBC # BLD AUTO: 0 K/UL
NRBC BLD-RTO: 0 /100 WBC
PLATELET # BLD AUTO: 274 K/UL (ref 164–446)
PMV BLD AUTO: 11.2 FL (ref 9–12.9)
POTASSIUM SERPL-SCNC: 3.8 MMOL/L (ref 3.6–5.5)
PROT SERPL-MCNC: 7.7 G/DL (ref 6–8.2)
RBC # BLD AUTO: 5.1 M/UL (ref 4.2–5.4)
SODIUM SERPL-SCNC: 142 MMOL/L (ref 135–145)
WBC # BLD AUTO: 12.4 K/UL (ref 4.8–10.8)

## 2022-10-16 PROCEDURE — 93005 ELECTROCARDIOGRAM TRACING: CPT | Performed by: EMERGENCY MEDICINE

## 2022-10-16 PROCEDURE — 80053 COMPREHEN METABOLIC PANEL: CPT

## 2022-10-16 PROCEDURE — 99284 EMERGENCY DEPT VISIT MOD MDM: CPT

## 2022-10-16 PROCEDURE — 36415 COLL VENOUS BLD VENIPUNCTURE: CPT

## 2022-10-16 PROCEDURE — 71045 X-RAY EXAM CHEST 1 VIEW: CPT

## 2022-10-16 PROCEDURE — 85025 COMPLETE CBC W/AUTO DIFF WBC: CPT

## 2022-10-16 PROCEDURE — 700101 HCHG RX REV CODE 250: Performed by: EMERGENCY MEDICINE

## 2022-10-16 PROCEDURE — 94640 AIRWAY INHALATION TREATMENT: CPT

## 2022-10-16 PROCEDURE — 700111 HCHG RX REV CODE 636 W/ 250 OVERRIDE (IP): Performed by: EMERGENCY MEDICINE

## 2022-10-16 RX ORDER — PREDNISONE 20 MG/1
60 TABLET ORAL ONCE
Status: COMPLETED | OUTPATIENT
Start: 2022-10-16 | End: 2022-10-16

## 2022-10-16 RX ORDER — IPRATROPIUM BROMIDE AND ALBUTEROL SULFATE 2.5; .5 MG/3ML; MG/3ML
3 SOLUTION RESPIRATORY (INHALATION)
Status: DISCONTINUED | OUTPATIENT
Start: 2022-10-16 | End: 2022-10-17 | Stop reason: HOSPADM

## 2022-10-16 RX ORDER — AZITHROMYCIN 250 MG/1
TABLET, FILM COATED ORAL
Qty: 6 TABLET | Refills: 0 | Status: ON HOLD | OUTPATIENT
Start: 2022-10-16 | End: 2022-10-26

## 2022-10-16 RX ORDER — METHYLPREDNISOLONE 4 MG/1
TABLET ORAL
Qty: 1 EACH | Refills: 0 | Status: ON HOLD | OUTPATIENT
Start: 2022-10-16 | End: 2022-10-26

## 2022-10-16 RX ADMIN — PREDNISONE 60 MG: 20 TABLET ORAL at 17:18

## 2022-10-16 RX ADMIN — IPRATROPIUM BROMIDE AND ALBUTEROL SULFATE 3 ML: .5; 2.5 SOLUTION RESPIRATORY (INHALATION) at 17:20

## 2022-10-16 ASSESSMENT — FIBROSIS 4 INDEX: FIB4 SCORE: 0.56

## 2022-10-16 NOTE — ED TRIAGE NOTES
Lorene Haro  47 y.o.  female  Chief Complaint   Patient presents with   • Back Pain     Pt c/o L lower back pain, started this morning after bending to  laundry. Pain radiating down L leg. Able to ambulate with walker in to triage.   • Cough     Pt d/c Oct 7 after being admitted for pneumonia. Feels that phlegm/cough is not improving. Congested cough noted.       Patient educated on triage process, to alert staff if any changes in condition.  BIB [facility]. Given duoneb en route.

## 2022-10-17 ENCOUNTER — TELEPHONE (OUTPATIENT)
Dept: CARDIOLOGY | Facility: MEDICAL CENTER | Age: 47
End: 2022-10-17
Payer: MEDICAID

## 2022-10-17 NOTE — ED PROVIDER NOTES
ED Provider Note    CHIEF COMPLAINT  Chief Complaint   Patient presents with    Back Pain     Pt c/o L lower back pain, started this morning after bending to  laundry. Pain radiating down L leg. Able to ambulate with walker in to triage.    Cough     Pt d/c Oct 7 after being admitted for pneumonia. Feels that phlegm/cough is not improving. Congested cough noted.       HPI  Lorene Haro is a 47 y.o. female who presents for evaluation of ongoing cough and wheezing and acute low back pain.  The patient has a complex medical history as listed below.  She was admitted at this facility for underlying pneumonia earlier in the month.  She reports she was feeling better and then her cough and wheezing started again.  She was at home earlier today and bent down to pick something up and felt a twinge of pain in the left aspect of her back.  She does have a history of sciatica pain is described as aching left-sided radiates down the left buttock.  No bowel or bladder incontinence fevers or chills.  No groin paresthesias.  No prior history of back surgery according to the patient.  No numbness weakness or tingling    REVIEW OF SYSTEMS  See HPI for further details.  Positive for cough and wheezing low back pain all other systems are negative.     PAST MEDICAL HISTORY  Past Medical History:   Diagnosis Date    Yeast infection of the skin 2021    A-fib ([facility])     Asthma     Bipolar 2 disorder ([facility])     Chickenpox     Hypertension     On home oxygen therapy     Other psychological stress     Schizophrenic disorder ([facility])        FAMILY HISTORY  Noncontributory    SOCIAL HISTORY  Social History     Socioeconomic History    Marital status: Single   Tobacco Use    Smoking status: Former     Packs/day: 0.25     Types: Cigarettes     Quit date: 10/12/2022     Years since quittin.0    Smokeless tobacco: Never   Vaping Use    Vaping Use: Never used   Substance and Sexual Activity    Alcohol use: Never    Drug use: Never     Sexual activity: Not Currently   Social History Narrative    ** Merged History Encounter **         [facility]     Financial Resource Strain: Medium Risk    Difficulty of Paying Living Expenses: Somewhat hard   Food Insecurity: Food Insecurity Present    Worried About Running Out of Food in the Last Year: Often true    Ran Out of Food in the Last Year: Often true   Transportation Needs: No Transportation Needs    Lack of Transportation (Medical): No    Lack of Transportation (Non-Medical): No       SURGICAL HISTORY  Past Surgical History:   Procedure Laterality Date    CHOLECYSTECTOMY      COLECTOMY      HYSTERECTOMY LAPAROSCOPY      KNEE ARTHROSCOPY Left        CURRENT MEDICATIONS  Home Medications       Reviewed by Cara Knog R.N. (Registered Nurse) on 10/16/22 at 1608  Med List Status: Not Addressed     Medication Last Dose Status   albuterol 108 (90 Base) MCG/ACT Aero Soln inhalation aerosol  Active   albuterol 108 (90 Base) MCG/ACT Aero Soln inhalation aerosol  Active   aripiprazole (ABILIFY) 30 MG tablet  Active   atorvastatin (LIPITOR) 20 MG Tab  Active   azithromycin (ZITHROMAX) 250 MG Tab  Active   famotidine (PEPCID) 20 MG Tab  Active   fluticasone-salmeterol (ADVAIR) 100-50 MCG/ACT AEROSOL POWDER, BREATH ACTIVATED  Active   furosemide (LASIX) 40 MG Tab  Active   methocarbamol (ROBAXIN) 750 MG Tab  Active   montelukast (SINGULAIR) 10 MG Tab  Active   nitroglycerin (NITROSTAT) 0.4 MG SL Tab  Active   nystatin (MYCOSTATIN) 662336 UNIT/GM Cream topical cream  Active   potassium chloride SA (K-DUR) 10 MEQ Tab CR  Active   predniSONE (DELTASONE) 50 MG Tab  Active   traZODone (DESYREL) 50 MG Tab  Active                    ALLERGIES  Allergies   Allergen Reactions    Penicillin G Rash and Itching     pt reports that she gets a rash all over her body and gets itchy    Amoxicillin Rash and Itching     Tolerates cephalosporins, pt reports that she gets a rash all over her  "body and gets itchy       PHYSICAL EXAM  VITAL SIGNS: /60   Pulse 92   Temp 36.3 °C (97.3 °F) (Temporal)   Resp 18   Ht 1.575 m (5' 2\")   Wt (!) 128 kg (282 lb 3 oz)   LMP  (LMP Unknown)   SpO2 95%   BMI 51.61 kg/m²       Constitutional: Patient appears chronically ill  HENT: Normocephalic, Atraumatic, Bilateral external ears normal, Oropharynx moist, No oral exudates, Nose normal.   Eyes: PERRLA, EOMI, Conjunctiva normal, No discharge.   Neck: Normal range of motion, No tenderness, Supple, No stridor.   Cardiovascular: Normal heart rate, Normal rhythm, No murmurs, No rubs, No gallops.   Thorax & Lungs: Bilateral rhonchi with wheezing no chest tenderness.   Abdomen: Bowel sounds normal, Soft, No tenderness, No masses, No pulsatile masses.   Skin: Warm, Dry, No erythema, No rash.   Back: No midline bony tenderness, tenderness noted over the left lower back overlying the sciatic foramen, No CVA tenderness.   Genitalia: Groin sensation is normal   extremities: Intact distal pulses, No edema, No tenderness, No cyanosis, No clubbing.   Neurologic: Alert & oriented x 3, Normal motor function, Normal sensory function, No focal deficits noted.   Psychiatric: Affect normal, Judgment normal, Mood normal.     DX-CHEST-PORTABLE (1 VIEW)   Final Result      No acute cardiac or pulmonary abnormalities are identified.        Results for orders placed or performed during the hospital encounter of 10/16/22   CBC with Differential   Result Value Ref Range    WBC 12.4 (H) 4.8 - 10.8 K/uL    RBC 5.10 4.20 - 5.40 M/uL    Hemoglobin 13.4 12.0 - 16.0 g/dL    Hematocrit 44.5 37.0 - 47.0 %    MCV 87.3 81.4 - 97.8 fL    MCH 26.3 (L) 27.0 - 33.0 pg    MCHC 30.1 (L) 33.6 - 35.0 g/dL    RDW 53.0 (H) 35.9 - 50.0 fL    Platelet Count 274 164 - 446 K/uL    MPV 11.2 9.0 - 12.9 fL    Neutrophils-Polys 76.00 (H) 44.00 - 72.00 %    Lymphocytes 16.70 (L) 22.00 - 41.00 %    Monocytes 6.00 0.00 - 13.40 %    Eosinophils 0.60 0.00 - 6.90 %    " Basophils 0.10 0.00 - 1.80 %    Immature Granulocytes 0.60 0.00 - 0.90 %    Nucleated RBC 0.00 /100 WBC    Neutrophils (Absolute) 9.41 (H) 2.00 - 7.15 K/uL    Lymphs (Absolute) 2.07 1.00 - 4.80 K/uL    Monos (Absolute) 0.74 0.00 - 0.85 K/uL    Eos (Absolute) 0.08 0.00 - 0.51 K/uL    Baso (Absolute) 0.01 0.00 - 0.12 K/uL    Immature Granulocytes (abs) 0.07 0.00 - 0.11 K/uL    NRBC (Absolute) 0.00 K/uL   Comp Metabolic Panel   Result Value Ref Range    Sodium 142 135 - 145 mmol/L    Potassium 3.8 3.6 - 5.5 mmol/L    Chloride 102 96 - 112 mmol/L    Co2 30 20 - 33 mmol/L    Anion Gap 10.0 7.0 - 16.0    Glucose 114 (H) 65 - 99 mg/dL    Bun 12 8 - 22 mg/dL    Creatinine 1.00 0.50 - 1.40 mg/dL    Calcium 10.0 8.5 - 10.5 mg/dL    AST(SGOT) 12 12 - 45 U/L    ALT(SGPT) 21 2 - 50 U/L    Alkaline Phosphatase 127 (H) 30 - 99 U/L    Total Bilirubin 0.8 0.1 - 1.5 mg/dL    Albumin 4.4 3.2 - 4.9 g/dL    Total Protein 7.7 6.0 - 8.2 g/dL    Globulin 3.3 1.9 - 3.5 g/dL    A-G Ratio 1.3 g/dL   Blood Culture,Hold   Result Value Ref Range    Blood Culture Hold Collected    ESTIMATED GFR   Result Value Ref Range    GFR (CKD-EPI) 69 >60 mL/min/1.73 m 2   EKG   Result Value Ref Range    Report       [facility]    Test Date:  2022-10-16  Pt Name:    ADELINA MILLAN                Department: ER  MRN:        [id number]                      Room:  Gender:     Female                       Technician: [id number]  :        1975                   Requested By:ER TRIAGE PROTOCOL  Order #:    [id number]                    Reading MD:    Measurements  Intervals                                Axis  Rate:       86                           P:          44  AK:         134                          QRS:        50  QRSD:       85                           T:          28  QT:         403  QTc:        482    Interpretive Statements  Sinus rhythm  Atrial premature complex  Low voltage, precordial leads  Compared to ECG  10/14/2022 02:58:19  T-wave abnormality no longer present        EKG twelve-lead interpretation by me rate 86 sinus rhythm PAC noted no acute ST segment elevation depression no pathological T wave inversions no suggestion of arrhythmia or ischemia  COURSE & MEDICAL DECISION MAKING  Pertinent Labs & Imaging studies reviewed. (See chart for details)  Basic laboratory studies were ordered from triage.  She has mild leukocytosis but no bandemia.  Metabolic panel is unremarkable.  Chest x-ray is negative for acute heart failure or pneumonia.  I reviewed her records and compared her chest x-ray today from previous visits.  Her lungs are relatively clear but clearly she is having wheezing and a COPD exacerbation.  She does report mild change in sputum production.  She is already on home oxygen.  In regards to her back I did not feel that any acute imaging is indicated as there is no direct or blunt trauma and her symptoms are most consistent with acute low back strain with sciatica type symptoms.  She was given an oral dose of prednisone as well as a DuoNeb and is feeling better.  I will discharge her home on a Medrol Dosepak, Z-Romario and recommend she return as needed for new or worsening symptoms    FINAL IMPRESSION  1.  COPD exacerbation  2.  Acute sciatica left-sided      Electronically signed by: Juan Day M.D., 10/16/2022 5:08 PM

## 2022-10-17 NOTE — DISCHARGE PLANNING
NEREIDA met with pt to discuss ride home. She states her family does not have any money for gas or keys to her apartment to get her oxygen tank. Pt states she asked [facility] to get her tank prior to bringing her here but there is no tank at bedside.    NEREIDA spoke with Vicki at [facility] who states their  left at 1700 and no available rides until tomorrow. Call to [facility] (Juan C) to request portable tank be brought to the hospital. He will have on-call  call me after verifying she is on service with them.     NEREIDA spoke with LEELA (Patito) to verify that pt DOES have transportation benefits.

## 2022-10-17 NOTE — ED NOTES
Patient states she is feeling better and ready for discharge.  Patient is on O2 2L 24/7.  Patient came in via [facility] and did not bring her O2 tank.  States last time she was transported home via [facility].  Will contact  for transportation assistance.

## 2022-10-17 NOTE — DISCHARGE SUMMARY
NEREIDA met with pt to discuss ride home. She states her family does not have any money for gas or keys to her apartment to get her oxygen tank. Pt states she asked [facility] to get her tank prior to bringing her here but there is no tank at bedside.    NEREIDA spoke with Vicki at [facility] who states their  left at 1700 and no available rides until tomorrow. Call to [employer] (Juan C) to request portable tank be brought to the hospital. He will have on-call  call me after verifying she is on service with them.     NEREIDA spoke with LEELA (Patito) to verify that pt DOES have transportation benefits.

## 2022-10-17 NOTE — DISCHARGE PLANNING
Per David with [employer], she is not on service with them.     NEREIDA spoke with Colleen who verifies pt is on service with [facility]. The on call  will call once he is on his way with a portable tank.     Once tank arrives pt can call [facility] for taxi ride home (will provide flyer).     Hand off to night shift.

## 2022-10-17 NOTE — DISCHARGE PLANNING
Medical Social Work    MSW received a voalte message from bedside RN that [facility].  [facility] is closed and pt has to get up 2 flights of stairs with a walker and oxygen of which are both at home.  MSW contacted Bruna and Zuleyma at [facility] to arrange stretcher transport.  PCS and facesheet faxed to [facility] and follow up phone call was made to arrange transport for 2200.  Bedside RN made aware of transport time.  Per [facility] they are not sure how long transport may actually take as they only have 1 unit for transport right now.  RN aware.

## 2022-10-17 NOTE — TELEPHONE ENCOUNTER
VR    Caller:   Marie  [facility]    Topic/issue: She would like a call back to discuss some Home Health needs for Lorene,    Callback Number:  -   [phone] or [phone]    Thank you,   Trish BARR

## 2022-10-17 NOTE — ED NOTES
contacted again.  Patient states she is unable to get into her residence without assistance.  States no neighbors//friends to help.   and case management aware.     Report to Lazaro GREENE.

## 2022-10-20 NOTE — TELEPHONE ENCOUNTER
Caller: Marie    Topic/issue: Marie is returning your call    Callback Number:   [phone]    Thank you,   Trish BARR

## 2022-10-21 ENCOUNTER — NON-PROVIDER VISIT (OUTPATIENT)
Dept: CARDIOLOGY | Facility: MEDICAL CENTER | Age: 47
End: 2022-10-21
Payer: MEDICAID

## 2022-10-21 PROCEDURE — 93298 REM INTERROG DEV EVAL SCRMS: CPT | Performed by: INTERNAL MEDICINE

## 2022-10-22 ENCOUNTER — APPOINTMENT (OUTPATIENT)
Dept: RADIOLOGY | Facility: MEDICAL CENTER | Age: 47
DRG: 202 | End: 2022-10-22
Attending: EMERGENCY MEDICINE
Payer: MEDICAID

## 2022-10-22 ENCOUNTER — HOSPITAL ENCOUNTER (INPATIENT)
Facility: MEDICAL CENTER | Age: 47
LOS: 3 days | DRG: 202 | End: 2022-10-26
Attending: EMERGENCY MEDICINE | Admitting: HOSPITALIST
Payer: MEDICAID

## 2022-10-22 DIAGNOSIS — E66.2 OBESITY HYPOVENTILATION SYNDROME (HCC): ICD-10-CM

## 2022-10-22 DIAGNOSIS — J45.901 SEVERE ASTHMA WITH ACUTE EXACERBATION, UNSPECIFIED WHETHER PERSISTENT: ICD-10-CM

## 2022-10-22 DIAGNOSIS — J96.01 ACUTE RESPIRATORY FAILURE WITH HYPOXIA AND HYPERCAPNIA (HCC): ICD-10-CM

## 2022-10-22 DIAGNOSIS — Z95.818 STATUS POST PLACEMENT OF IMPLANTABLE LOOP RECORDER: ICD-10-CM

## 2022-10-22 DIAGNOSIS — J96.02 ACUTE RESPIRATORY FAILURE WITH HYPOXIA AND HYPERCAPNIA (HCC): ICD-10-CM

## 2022-10-22 DIAGNOSIS — J45.50 SEVERE PERSISTENT ASTHMA WITHOUT COMPLICATION: ICD-10-CM

## 2022-10-22 DIAGNOSIS — M54.9 PAIN, UPPER BACK: ICD-10-CM

## 2022-10-22 DIAGNOSIS — J44.1 ACUTE EXACERBATION OF CHRONIC OBSTRUCTIVE PULMONARY DISEASE (COPD) (HCC): ICD-10-CM

## 2022-10-22 PROCEDURE — 36415 COLL VENOUS BLD VENIPUNCTURE: CPT

## 2022-10-22 PROCEDURE — 99285 EMERGENCY DEPT VISIT HI MDM: CPT

## 2022-10-22 PROCEDURE — 86140 C-REACTIVE PROTEIN: CPT

## 2022-10-22 PROCEDURE — 85025 COMPLETE CBC W/AUTO DIFF WBC: CPT

## 2022-10-22 PROCEDURE — 83880 ASSAY OF NATRIURETIC PEPTIDE: CPT

## 2022-10-22 PROCEDURE — 85652 RBC SED RATE AUTOMATED: CPT

## 2022-10-22 PROCEDURE — 84484 ASSAY OF TROPONIN QUANT: CPT

## 2022-10-22 PROCEDURE — 93005 ELECTROCARDIOGRAM TRACING: CPT | Performed by: EMERGENCY MEDICINE

## 2022-10-22 PROCEDURE — 80048 BASIC METABOLIC PNL TOTAL CA: CPT

## 2022-10-22 ASSESSMENT — FIBROSIS 4 INDEX: FIB4 SCORE: 0.47

## 2022-10-23 ENCOUNTER — APPOINTMENT (OUTPATIENT)
Dept: RADIOLOGY | Facility: MEDICAL CENTER | Age: 47
DRG: 202 | End: 2022-10-23
Attending: STUDENT IN AN ORGANIZED HEALTH CARE EDUCATION/TRAINING PROGRAM
Payer: MEDICAID

## 2022-10-23 PROBLEM — J45.901 SEVERE ASTHMA WITH ACUTE EXACERBATION, UNSPECIFIED WHETHER PERSISTENT: Status: ACTIVE | Noted: 2022-10-23

## 2022-10-23 PROBLEM — J44.1 COPD WITH ACUTE EXACERBATION (HCC): Status: ACTIVE | Noted: 2022-10-23

## 2022-10-23 PROBLEM — Z95.818 STATUS POST PLACEMENT OF IMPLANTABLE LOOP RECORDER: Status: ACTIVE | Noted: 2022-10-23

## 2022-10-23 PROBLEM — J44.1 COPD WITH ACUTE EXACERBATION (HCC): Status: RESOLVED | Noted: 2022-10-23 | Resolved: 2022-10-23

## 2022-10-23 PROBLEM — M54.2 NECK PAIN: Status: ACTIVE | Noted: 2022-10-23

## 2022-10-23 LAB
ANION GAP SERPL CALC-SCNC: 10 MMOL/L (ref 7–16)
ANION GAP SERPL CALC-SCNC: 9 MMOL/L (ref 7–16)
BASE EXCESS BLDA CALC-SCNC: 0 MMOL/L (ref -4–3)
BASOPHILS # BLD AUTO: 0.1 % (ref 0–1.8)
BASOPHILS # BLD AUTO: 0.1 % (ref 0–1.8)
BASOPHILS # BLD: 0.01 K/UL (ref 0–0.12)
BASOPHILS # BLD: 0.01 K/UL (ref 0–0.12)
BLOOD CULTURE HOLD CXBCH: NORMAL
BODY TEMPERATURE: 36.4 CENTIGRADE
BUN SERPL-MCNC: 12 MG/DL (ref 8–22)
BUN SERPL-MCNC: 13 MG/DL (ref 8–22)
CALCIUM SERPL-MCNC: 9.1 MG/DL (ref 8.5–10.5)
CALCIUM SERPL-MCNC: 9.3 MG/DL (ref 8.5–10.5)
CHLORIDE SERPL-SCNC: 104 MMOL/L (ref 96–112)
CHLORIDE SERPL-SCNC: 104 MMOL/L (ref 96–112)
CO2 SERPL-SCNC: 27 MMOL/L (ref 20–33)
CO2 SERPL-SCNC: 28 MMOL/L (ref 20–33)
CREAT SERPL-MCNC: 0.93 MG/DL (ref 0.5–1.4)
CREAT SERPL-MCNC: 1.04 MG/DL (ref 0.5–1.4)
CRP SERPL HS-MCNC: 2.72 MG/DL (ref 0–0.75)
EKG IMPRESSION: NORMAL
EOSINOPHIL # BLD AUTO: 0 K/UL (ref 0–0.51)
EOSINOPHIL # BLD AUTO: 0.1 K/UL (ref 0–0.51)
EOSINOPHIL NFR BLD: 0 % (ref 0–6.9)
EOSINOPHIL NFR BLD: 0.9 % (ref 0–6.9)
ERYTHROCYTE [DISTWIDTH] IN BLOOD BY AUTOMATED COUNT: 52.7 FL (ref 35.9–50)
ERYTHROCYTE [DISTWIDTH] IN BLOOD BY AUTOMATED COUNT: 52.8 FL (ref 35.9–50)
ERYTHROCYTE [SEDIMENTATION RATE] IN BLOOD BY WESTERGREN METHOD: 31 MM/HOUR (ref 0–25)
GFR SERPLBLD CREATININE-BSD FMLA CKD-EPI: 66 ML/MIN/1.73 M 2
GFR SERPLBLD CREATININE-BSD FMLA CKD-EPI: 75 ML/MIN/1.73 M 2
GLUCOSE SERPL-MCNC: 124 MG/DL (ref 65–99)
GLUCOSE SERPL-MCNC: 152 MG/DL (ref 65–99)
HCO3 BLDA-SCNC: 27 MMOL/L (ref 17–25)
HCT VFR BLD AUTO: 39.1 % (ref 37–47)
HCT VFR BLD AUTO: 39.3 % (ref 37–47)
HGB BLD-MCNC: 11.8 G/DL (ref 12–16)
HGB BLD-MCNC: 11.8 G/DL (ref 12–16)
IMM GRANULOCYTES # BLD AUTO: 0.04 K/UL (ref 0–0.11)
IMM GRANULOCYTES # BLD AUTO: 0.05 K/UL (ref 0–0.11)
IMM GRANULOCYTES NFR BLD AUTO: 0.4 % (ref 0–0.9)
IMM GRANULOCYTES NFR BLD AUTO: 0.6 % (ref 0–0.9)
LYMPHOCYTES # BLD AUTO: 0.52 K/UL (ref 1–4.8)
LYMPHOCYTES # BLD AUTO: 2.1 K/UL (ref 1–4.8)
LYMPHOCYTES NFR BLD: 19.9 % (ref 22–41)
LYMPHOCYTES NFR BLD: 6.3 % (ref 22–41)
MCH RBC QN AUTO: 26.1 PG (ref 27–33)
MCH RBC QN AUTO: 26.4 PG (ref 27–33)
MCHC RBC AUTO-ENTMCNC: 30 G/DL (ref 33.6–35)
MCHC RBC AUTO-ENTMCNC: 30.2 G/DL (ref 33.6–35)
MCV RBC AUTO: 86.5 FL (ref 81.4–97.8)
MCV RBC AUTO: 87.9 FL (ref 81.4–97.8)
MONOCYTES # BLD AUTO: 0.11 K/UL (ref 0–0.85)
MONOCYTES # BLD AUTO: 0.51 K/UL (ref 0–0.85)
MONOCYTES NFR BLD AUTO: 1.3 % (ref 0–13.4)
MONOCYTES NFR BLD AUTO: 4.8 % (ref 0–13.4)
NEUTROPHILS # BLD AUTO: 7.51 K/UL (ref 2–7.15)
NEUTROPHILS # BLD AUTO: 7.78 K/UL (ref 2–7.15)
NEUTROPHILS NFR BLD: 73.9 % (ref 44–72)
NEUTROPHILS NFR BLD: 91.7 % (ref 44–72)
NRBC # BLD AUTO: 0 K/UL
NRBC # BLD AUTO: 0 K/UL
NRBC BLD-RTO: 0 /100 WBC
NRBC BLD-RTO: 0 /100 WBC
NT-PROBNP SERPL IA-MCNC: 163 PG/ML (ref 0–125)
PCO2 BLDA: 51.3 MMHG (ref 26–37)
PH BLDA: 7.34 [PH] (ref 7.4–7.5)
PLATELET # BLD AUTO: 210 K/UL (ref 164–446)
PLATELET # BLD AUTO: 213 K/UL (ref 164–446)
PMV BLD AUTO: 11.1 FL (ref 9–12.9)
PMV BLD AUTO: 11.3 FL (ref 9–12.9)
PO2 BLDA: 81.6 MMHG (ref 64–87)
POTASSIUM SERPL-SCNC: 4 MMOL/L (ref 3.6–5.5)
POTASSIUM SERPL-SCNC: 4.6 MMOL/L (ref 3.6–5.5)
PROCALCITONIN SERPL-MCNC: 0.07 NG/ML
RBC # BLD AUTO: 4.47 M/UL (ref 4.2–5.4)
RBC # BLD AUTO: 4.52 M/UL (ref 4.2–5.4)
SAO2 % BLDA: 94.9 % (ref 93–99)
SODIUM SERPL-SCNC: 141 MMOL/L (ref 135–145)
SODIUM SERPL-SCNC: 141 MMOL/L (ref 135–145)
TROPONIN T SERPL-MCNC: 27 NG/L (ref 6–19)
WBC # BLD AUTO: 10.5 K/UL (ref 4.8–10.8)
WBC # BLD AUTO: 8.2 K/UL (ref 4.8–10.8)

## 2022-10-23 PROCEDURE — 94640 AIRWAY INHALATION TREATMENT: CPT

## 2022-10-23 PROCEDURE — 96374 THER/PROPH/DIAG INJ IV PUSH: CPT

## 2022-10-23 PROCEDURE — 700101 HCHG RX REV CODE 250: Performed by: STUDENT IN AN ORGANIZED HEALTH CARE EDUCATION/TRAINING PROGRAM

## 2022-10-23 PROCEDURE — 700101 HCHG RX REV CODE 250: Performed by: EMERGENCY MEDICINE

## 2022-10-23 PROCEDURE — 87040 BLOOD CULTURE FOR BACTERIA: CPT | Mod: 91

## 2022-10-23 PROCEDURE — 700102 HCHG RX REV CODE 250 W/ 637 OVERRIDE(OP): Performed by: STUDENT IN AN ORGANIZED HEALTH CARE EDUCATION/TRAINING PROGRAM

## 2022-10-23 PROCEDURE — 96372 THER/PROPH/DIAG INJ SC/IM: CPT

## 2022-10-23 PROCEDURE — G0378 HOSPITAL OBSERVATION PER HR: HCPCS

## 2022-10-23 PROCEDURE — 99253 IP/OBS CNSLTJ NEW/EST LOW 45: CPT | Performed by: INTERNAL MEDICINE

## 2022-10-23 PROCEDURE — 85652 RBC SED RATE AUTOMATED: CPT

## 2022-10-23 PROCEDURE — 84145 PROCALCITONIN (PCT): CPT

## 2022-10-23 PROCEDURE — 85025 COMPLETE CBC W/AUTO DIFF WBC: CPT

## 2022-10-23 PROCEDURE — 94760 N-INVAS EAR/PLS OXIMETRY 1: CPT

## 2022-10-23 PROCEDURE — 770001 HCHG ROOM/CARE - MED/SURG/GYN PRIV*

## 2022-10-23 PROCEDURE — 82803 BLOOD GASES ANY COMBINATION: CPT

## 2022-10-23 PROCEDURE — 700111 HCHG RX REV CODE 636 W/ 250 OVERRIDE (IP): Performed by: EMERGENCY MEDICINE

## 2022-10-23 PROCEDURE — 94660 CPAP INITIATION&MGMT: CPT

## 2022-10-23 PROCEDURE — 72100 X-RAY EXAM L-S SPINE 2/3 VWS: CPT

## 2022-10-23 PROCEDURE — 700111 HCHG RX REV CODE 636 W/ 250 OVERRIDE (IP): Performed by: STUDENT IN AN ORGANIZED HEALTH CARE EDUCATION/TRAINING PROGRAM

## 2022-10-23 PROCEDURE — 71045 X-RAY EXAM CHEST 1 VIEW: CPT

## 2022-10-23 PROCEDURE — 94669 MECHANICAL CHEST WALL OSCILL: CPT

## 2022-10-23 PROCEDURE — 72070 X-RAY EXAM THORAC SPINE 2VWS: CPT

## 2022-10-23 PROCEDURE — 80048 BASIC METABOLIC PNL TOTAL CA: CPT

## 2022-10-23 PROCEDURE — A9270 NON-COVERED ITEM OR SERVICE: HCPCS | Performed by: STUDENT IN AN ORGANIZED HEALTH CARE EDUCATION/TRAINING PROGRAM

## 2022-10-23 PROCEDURE — 36415 COLL VENOUS BLD VENIPUNCTURE: CPT

## 2022-10-23 PROCEDURE — 96375 TX/PRO/DX INJ NEW DRUG ADDON: CPT

## 2022-10-23 PROCEDURE — 99220 PR INITIAL OBSERVATION CARE,LEVL III: CPT | Performed by: STUDENT IN AN ORGANIZED HEALTH CARE EDUCATION/TRAINING PROGRAM

## 2022-10-23 RX ORDER — ENOXAPARIN SODIUM 100 MG/ML
60 INJECTION SUBCUTANEOUS EVERY 12 HOURS
Status: DISCONTINUED | OUTPATIENT
Start: 2022-10-23 | End: 2022-10-26 | Stop reason: HOSPADM

## 2022-10-23 RX ORDER — TRAZODONE HYDROCHLORIDE 50 MG/1
50 TABLET ORAL
Status: DISCONTINUED | OUTPATIENT
Start: 2022-10-23 | End: 2022-10-26 | Stop reason: HOSPADM

## 2022-10-23 RX ORDER — MORPHINE SULFATE 4 MG/ML
2 INJECTION INTRAVENOUS ONCE
Status: COMPLETED | OUTPATIENT
Start: 2022-10-23 | End: 2022-10-23

## 2022-10-23 RX ORDER — FUROSEMIDE 40 MG/1
40 TABLET ORAL DAILY
Status: DISCONTINUED | OUTPATIENT
Start: 2022-10-23 | End: 2022-10-26 | Stop reason: HOSPADM

## 2022-10-23 RX ORDER — ONDANSETRON 2 MG/ML
4 INJECTION INTRAMUSCULAR; INTRAVENOUS ONCE
Status: COMPLETED | OUTPATIENT
Start: 2022-10-23 | End: 2022-10-23

## 2022-10-23 RX ORDER — IPRATROPIUM BROMIDE AND ALBUTEROL SULFATE 2.5; .5 MG/3ML; MG/3ML
3 SOLUTION RESPIRATORY (INHALATION) ONCE
Status: COMPLETED | OUTPATIENT
Start: 2022-10-23 | End: 2022-10-23

## 2022-10-23 RX ORDER — FLUTICASONE PROPIONATE AND SALMETEROL 100; 50 UG/1; UG/1
1 POWDER RESPIRATORY (INHALATION) EVERY 12 HOURS
Status: DISCONTINUED | OUTPATIENT
Start: 2022-10-23 | End: 2022-10-23

## 2022-10-23 RX ORDER — BUDESONIDE AND FORMOTEROL FUMARATE DIHYDRATE 80; 4.5 UG/1; UG/1
2 AEROSOL RESPIRATORY (INHALATION)
Status: DISCONTINUED | OUTPATIENT
Start: 2022-10-23 | End: 2022-10-26 | Stop reason: HOSPADM

## 2022-10-23 RX ORDER — ARIPIPRAZOLE 10 MG/1
30 TABLET ORAL EVERY MORNING
Status: DISCONTINUED | OUTPATIENT
Start: 2022-10-23 | End: 2022-10-26 | Stop reason: HOSPADM

## 2022-10-23 RX ORDER — IPRATROPIUM BROMIDE AND ALBUTEROL SULFATE 2.5; .5 MG/3ML; MG/3ML
3 SOLUTION RESPIRATORY (INHALATION)
Status: DISPENSED | OUTPATIENT
Start: 2022-10-23 | End: 2022-10-24

## 2022-10-23 RX ORDER — ENOXAPARIN SODIUM 100 MG/ML
40 INJECTION SUBCUTANEOUS DAILY
Status: DISCONTINUED | OUTPATIENT
Start: 2022-10-23 | End: 2022-10-23

## 2022-10-23 RX ORDER — ATORVASTATIN CALCIUM 20 MG/1
20 TABLET, FILM COATED ORAL
Status: DISCONTINUED | OUTPATIENT
Start: 2022-10-23 | End: 2022-10-26 | Stop reason: HOSPADM

## 2022-10-23 RX ORDER — BUDESONIDE AND FORMOTEROL FUMARATE DIHYDRATE 80; 4.5 UG/1; UG/1
2 AEROSOL RESPIRATORY (INHALATION)
Status: DISCONTINUED | OUTPATIENT
Start: 2022-10-23 | End: 2022-10-23

## 2022-10-23 RX ORDER — ACETAMINOPHEN 325 MG/1
650 TABLET ORAL EVERY 6 HOURS PRN
Status: DISCONTINUED | OUTPATIENT
Start: 2022-10-23 | End: 2022-10-26 | Stop reason: HOSPADM

## 2022-10-23 RX ORDER — METHYLPREDNISOLONE SODIUM SUCCINATE 40 MG/ML
40 INJECTION, POWDER, LYOPHILIZED, FOR SOLUTION INTRAMUSCULAR; INTRAVENOUS EVERY 12 HOURS
Status: DISCONTINUED | OUTPATIENT
Start: 2022-10-23 | End: 2022-10-25

## 2022-10-23 RX ORDER — MONTELUKAST SODIUM 10 MG/1
10 TABLET ORAL EVERY EVENING
Status: DISCONTINUED | OUTPATIENT
Start: 2022-10-23 | End: 2022-10-26 | Stop reason: HOSPADM

## 2022-10-23 RX ADMIN — METHYLPREDNISOLONE SODIUM SUCCINATE 40 MG: 40 INJECTION, POWDER, FOR SOLUTION INTRAMUSCULAR; INTRAVENOUS at 05:27

## 2022-10-23 RX ADMIN — ONDANSETRON HYDROCHLORIDE 4 MG: 2 SOLUTION INTRAMUSCULAR; INTRAVENOUS at 00:43

## 2022-10-23 RX ADMIN — ATORVASTATIN CALCIUM 20 MG: 20 TABLET, FILM COATED ORAL at 20:25

## 2022-10-23 RX ADMIN — IPRATROPIUM BROMIDE AND ALBUTEROL SULFATE 3 ML: .5; 2.5 SOLUTION RESPIRATORY (INHALATION) at 00:41

## 2022-10-23 RX ADMIN — MORPHINE SULFATE 2 MG: 4 INJECTION INTRAVENOUS at 00:43

## 2022-10-23 RX ADMIN — IPRATROPIUM BROMIDE AND ALBUTEROL SULFATE 3 ML: .5; 2.5 SOLUTION RESPIRATORY (INHALATION) at 13:05

## 2022-10-23 RX ADMIN — ENOXAPARIN SODIUM 60 MG: 60 INJECTION SUBCUTANEOUS at 17:22

## 2022-10-23 RX ADMIN — METHYLPREDNISOLONE SODIUM SUCCINATE 40 MG: 40 INJECTION, POWDER, FOR SOLUTION INTRAMUSCULAR; INTRAVENOUS at 17:22

## 2022-10-23 RX ADMIN — IPRATROPIUM BROMIDE AND ALBUTEROL SULFATE 3 ML: .5; 2.5 SOLUTION RESPIRATORY (INHALATION) at 23:24

## 2022-10-23 RX ADMIN — IPRATROPIUM BROMIDE AND ALBUTEROL SULFATE 3 ML: .5; 2.5 SOLUTION RESPIRATORY (INHALATION) at 20:08

## 2022-10-23 RX ADMIN — MONTELUKAST 10 MG: 10 TABLET, FILM COATED ORAL at 17:22

## 2022-10-23 RX ADMIN — FUROSEMIDE 40 MG: 40 TABLET ORAL at 05:28

## 2022-10-23 RX ADMIN — ENOXAPARIN SODIUM 60 MG: 60 INJECTION SUBCUTANEOUS at 05:27

## 2022-10-23 RX ADMIN — ARIPIPRAZOLE 30 MG: 10 TABLET ORAL at 05:27

## 2022-10-23 ASSESSMENT — COGNITIVE AND FUNCTIONAL STATUS - GENERAL
MOVING FROM LYING ON BACK TO SITTING ON SIDE OF FLAT BED: A LITTLE
DAILY ACTIVITIY SCORE: 16
HELP NEEDED FOR BATHING: A LITTLE
TURNING FROM BACK TO SIDE WHILE IN FLAT BAD: A LITTLE
TOILETING: A LOT
MOVING TO AND FROM BED TO CHAIR: A LITTLE
MOBILITY SCORE: 17
DRESSING REGULAR LOWER BODY CLOTHING: A LOT
CLIMB 3 TO 5 STEPS WITH RAILING: A LOT
SUGGESTED CMS G CODE MODIFIER MOBILITY: CK
PERSONAL GROOMING: A LITTLE
SUGGESTED CMS G CODE MODIFIER DAILY ACTIVITY: CK
STANDING UP FROM CHAIR USING ARMS: A LITTLE
DRESSING REGULAR UPPER BODY CLOTHING: A LITTLE
EATING MEALS: A LITTLE
WALKING IN HOSPITAL ROOM: A LITTLE

## 2022-10-23 ASSESSMENT — LIFESTYLE VARIABLES
HAVE PEOPLE ANNOYED YOU BY CRITICIZING YOUR DRINKING: NO
CONSUMPTION TOTAL: NEGATIVE
EVER FELT BAD OR GUILTY ABOUT YOUR DRINKING: NO
DOES PATIENT WANT TO STOP DRINKING: NO
ALCOHOL_USE: NO
EVER HAD A DRINK FIRST THING IN THE MORNING TO STEADY YOUR NERVES TO GET RID OF A HANGOVER: NO
TOTAL SCORE: 0
HAVE YOU EVER FELT YOU SHOULD CUT DOWN ON YOUR DRINKING: NO
AVERAGE NUMBER OF DAYS PER WEEK YOU HAVE A DRINK CONTAINING ALCOHOL: 0
ON A TYPICAL DAY WHEN YOU DRINK ALCOHOL HOW MANY DRINKS DO YOU HAVE: 0
TOTAL SCORE: 0
TOTAL SCORE: 0
HOW MANY TIMES IN THE PAST YEAR HAVE YOU HAD 5 OR MORE DRINKS IN A DAY: 0

## 2022-10-23 ASSESSMENT — ENCOUNTER SYMPTOMS
CHILLS: 0
COUGH: 0
CARDIOVASCULAR NEGATIVE: 1
GASTROINTESTINAL NEGATIVE: 1
SHORTNESS OF BREATH: 1
EYES NEGATIVE: 1
NECK PAIN: 1
PSYCHIATRIC NEGATIVE: 1
FEVER: 0
WHEEZING: 0
COUGH: 1

## 2022-10-23 ASSESSMENT — PAIN DESCRIPTION - PAIN TYPE
TYPE: CHRONIC PAIN
TYPE: CHRONIC PAIN

## 2022-10-23 ASSESSMENT — FIBROSIS 4 INDEX: FIB4 SCORE: 0.61

## 2022-10-23 NOTE — CARE PLAN
Problem: Knowledge Deficit - Standard  Goal: Patient and family/care givers will demonstrate understanding of plan of care, disease process/condition, diagnostic tests and medications  Outcome: Progressing     Problem: Ineffective Airway Clearance  Goal: Patient will maintain patent airway with clear/clearing breath sounds  Outcome: Progressing   The patient is Watcher - Medium risk of patient condition declining or worsening         Progress made toward(s) clinical / shift goals:  Patient verbalized calling for assistance before getting out of bed    Patient is not progressing towards the following goals:

## 2022-10-23 NOTE — RESPIRATORY CARE
LUNG EDUCATION by COPD CLINICAL EDUCATOR  10/23/2022 at 2:37 PM by Maylin Sullivan, RRT     Patient interviewed by education team. Patient has confirmed Asthma Exacerbation with severe Sleep Apnea. (PFTs confirm no obstruction). IP Pulmonary was notified and saw patient to assist with re-admission criteria.   We reviewed her current Respiratory Medication use and sleep device. She utilizes [facility] for her 2-3 lpm Oxygen needs.  Request for sleep equipment recently sent to [facility] from her [facility]

## 2022-10-23 NOTE — THERAPY
PT Contact Note     Patient Name: Lorene Haro  Age:  47 y.o., Sex:  female  Medical Record #: [id number]  Today's Date: 10/23/2022     10/23/22 1015   Initial Contact Note    Initial Contact Note Order Received and Verified, Physical Therapy Evaluation in Progress with Full Report to Follow.   Interdisciplinary Plan of Care Collaboration   IDT Collaboration with  Physical Therapist;Occupational Therapist;Other (See Comments)  (per chart review pt pending C/S MRI today.)   Collaboration Comments per chart review pt pending c/s MRI - will hold PT eval at this time and will reattempt when appropriate.   Session Information   Date / Session Number  10/23 1 - attempted, needs eval

## 2022-10-23 NOTE — ED NOTES
Pt ambulatory to yellow 57 by walker. Pt on 2lpm oxygen at home. Pt short of breath upon exertion but states it is not worse than usual as she has COPD.  Pt hooked up to 2lpm oxygen, pulse ox monitoring and blood pressure cuff. Pt reports back pain from neck down x 4 days. Denies any falls or traumatic injury.

## 2022-10-23 NOTE — ED NOTES
Med rec completed per patient  Allergies reviewed  No PO Antibiotics in the last 30 days     Patient was prescribed a Z-pack and Prednisone 50 mg once daily for five days on 10/13/2022. Patient was also prescribed another Z-pack and a Medrol Dosepack on 10/16/2022, patient states she was unable to go to the pharmacy to  any of the medications and has not started them.

## 2022-10-23 NOTE — PROGRESS NOTES
Assumed patient care and received report from Noemy RN Assessment completed. Pt A&Ox 4. Respirations are even and unlabored on 3l n/c. Pt denies pain at this time. Medical patient, VS stable, call light and belongings within reach. POC updated (RT treatments). Pt educated on room and call light, pt verbalized understanding. Communication board updated. Needs met.

## 2022-10-23 NOTE — ED TRIAGE NOTES
"Chief Complaint   Patient presents with    Back Pain     Began x2 days ago, upper cervical back pain radiates down thoracic/lumbar region, hx back pain, prior diagnosis of C4 and S1 nerve damage        BIB [facility] to triage for above complaint. Pt on 2L home O2. Hx a fib states has defibrillator, no blood thinners. EMS vitals 115/78 HR 88 96% 2L gcs 15    Pt educated of triage process and informed to contact staff if situation changes.    BP (!) 142/87   Pulse 82   Temp 36.4 °C (97.5 °F) (Temporal)   Resp 16   Ht 1.549 m (5' 1\")   Wt (!) 128 kg (282 lb 3 oz)   LMP  (LMP Unknown)   SpO2 96%   BMI 53.32 kg/m²      "

## 2022-10-23 NOTE — ASSESSMENT & PLAN NOTE
Agree with Dr. Kerr that patient does not have a diagnosis on COPD, she has no obstruction on PFTs done 1 year ago  Pt alert today with no signs of paco2 retention  -continue with symbicort  -continue with solumedrol 40mg IV BID   -duo nebs  -titrate O2 88-92%     Pt with known OHS with hypoxemia  KATHIA dx in 2018 /hr and was previously on  AVAPs titration with final pressure EPAP: 11 cm of water, IPAP: 24/15 cm of water, volume: 450 mL, rate of 14, and 3 L oxygen bleed in  She is an exsmoker but she does not have evidence of COPD    She has KATHIA, OHS, restrictive lung disease due to obesity and asthma    She has improved with rx for asthma  Follow up in pulm clinic post discharge  Pulm will sign off

## 2022-10-23 NOTE — ASSESSMENT & PLAN NOTE
[facility]  Can hold antibiotics for now.  Recently completed course of antibiotics and has no leukocytosis.  No fever or chills.  Oxygen, keep O2 over 88%

## 2022-10-23 NOTE — ED PROVIDER NOTES
ED Provider Note        Primary care provider: Heriberto Sharp M.D.    I verified that the patient was wearing a mask and I was wearing appropriate PPE every time I entered the room. The patient's mask was on the patient at all times during my encounter except for a brief view of the oropharynx.      CHIEF COMPLAINT  Chief Complaint   Patient presents with    Back Pain     Began x2 days ago, upper cervical back pain radiates down thoracic/lumbar region, hx back pain, prior diagnosis of C4 and S1 nerve damage       HPI  Lorene Haro is a 47 y.o. female who presents to the Emergency Department with chief complaint of neck pain.  Patient states she has a history of back pain however she fell couple days ago and since then has been having worsening upper and lower back pain she also reports that she is now having saddle anesthesia and some urinary incontinence.  No fecal incontinence she has had no measured fever she has had some slight chills she also reports that she has been having some chest pain and shortness of breath.  Patient has a history of chronic COPD with multiple recent emergency department visits for such.  She is able to ambulate but states she is she has severe pain throughout her entire back with ambulation.  No headache or altered mental status ongoing chest pain and shortness of breath which she states is somewhat worse than it usually is.  Her pain throughout her back is rated as a 9.5 out of 10 worse with ambulation no alleviating factors.  Patient has had several courses of steroids recently for COPD exacerbations.    REVIEW OF SYSTEMS  10 systems reviewed and otherwise negative, pertinent positives and negatives listed in the history of present illness.    PAST MEDICAL HISTORY   has a past medical history of A-fib ([facility]), Asthma, Bipolar 2 disorder ([facility]), Chickenpox, Hypertension, On home oxygen therapy, Other psychological stress, Schizophrenic disorder ([facility]), and Yeast infection of the  skin (2021).    SURGICAL HISTORY   has a past surgical history that includes hysterectomy laparoscopy; colectomy; cholecystectomy; and knee arthroscopy (Left).    SOCIAL HISTORY  Social History     Tobacco Use    Smoking status: Former     Packs/day: 0.25     Types: Cigarettes     Quit date: 10/12/2022     Years since quittin.0    Smokeless tobacco: Never   Vaping Use    Vaping Use: Never used   Substance Use Topics    Alcohol use: Never    Drug use: Never      Social History     Substance and Sexual Activity   Drug Use Never       FAMILY HISTORY  Non-Contributory    CURRENT MEDICATIONS  Home Medications       Reviewed by Halle Schaefer R.N. (Registered Nurse) on 10/22/22 at 2150  Med List Status: Partial     Medication Last Dose Status   albuterol 108 (90 Base) MCG/ACT Aero Soln inhalation aerosol  Active   albuterol 108 (90 Base) MCG/ACT Aero Soln inhalation aerosol  Active   aripiprazole (ABILIFY) 30 MG tablet  Active   atorvastatin (LIPITOR) 20 MG Tab  Active   azithromycin (ZITHROMAX) 250 MG Tab  Active   azithromycin (ZITHROMAX) 250 MG Tab  Active   famotidine (PEPCID) 20 MG Tab  Active   fluticasone-salmeterol (ADVAIR) 100-50 MCG/ACT AEROSOL POWDER, BREATH ACTIVATED  Active   furosemide (LASIX) 40 MG Tab  Active   methocarbamol (ROBAXIN) 750 MG Tab  Active   methylPREDNISolone (MEDROL DOSEPAK) 4 MG Tablet Therapy Pack  Active   montelukast (SINGULAIR) 10 MG Tab  Active   nitroglycerin (NITROSTAT) 0.4 MG SL Tab  Active   nystatin (MYCOSTATIN) 043349 UNIT/GM Cream topical cream  Active   potassium chloride SA (K-DUR) 10 MEQ Tab CR  Active   predniSONE (DELTASONE) 50 MG Tab  Active   traZODone (DESYREL) 50 MG Tab  Active                    ALLERGIES  Allergies   Allergen Reactions    Penicillin G Rash and Itching     pt reports that she gets a rash all over her body and gets itchy    Amoxicillin Rash and Itching     Tolerates cephalosporins, pt reports that she gets a rash all over her body and gets  "itchy       PHYSICAL EXAM  VITAL SIGNS: /69   Pulse 88   Temp 36.4 °C (97.5 °F) (Temporal)   Resp 16   Ht 1.549 m (5' 1\")   Wt (!) 128 kg (282 lb 3 oz)   LMP  (LMP Unknown)   SpO2 98%   BMI 53.32 kg/m²   Pulse ox interpretation: I interpret this pulse ox as normal.  Constitutional: Alert and oriented x 3, moderate distress  HEENT: Atraumatic normocephalic, pupils are equal round, extraocular movements are intact. The nares is clear, external ears are normal, mouth shows moist mucous membranes  Neck: no obvious JVD or tracheal deviation  Cardiovascular: Regular rate and rhythm no murmur rub or gallop   Thorax & Lungs: Tachypneic, minor wheezes in all lung fields  GI: Soft nontender nondistended positive bowel sounds, no peritoneal signs  Skin: Warm dry no obvious acute rash or lesion  Musculoskeletal: Tender throughout all spines no obvious erythema warmth induration Limited exam due to body habitus  Neurologic: Cranial nerves III through XII are grossly intact, no sensory deficit, no cerebellar dysfunction   Psychiatric: Appropriate affect for situation at this time      DIAGNOSTIC STUDIES / PROCEDURES  LABS      Results for orders placed or performed during the hospital encounter of 10/22/22   CBC WITH DIFFERENTIAL   Result Value Ref Range    WBC 10.5 4.8 - 10.8 K/uL    RBC 4.52 4.20 - 5.40 M/uL    Hemoglobin 11.8 (L) 12.0 - 16.0 g/dL    Hematocrit 39.1 37.0 - 47.0 %    MCV 86.5 81.4 - 97.8 fL    MCH 26.1 (L) 27.0 - 33.0 pg    MCHC 30.2 (L) 33.6 - 35.0 g/dL    RDW 52.8 (H) 35.9 - 50.0 fL    Platelet Count 210 164 - 446 K/uL    MPV 11.3 9.0 - 12.9 fL    Neutrophils-Polys 73.90 (H) 44.00 - 72.00 %    Lymphocytes 19.90 (L) 22.00 - 41.00 %    Monocytes 4.80 0.00 - 13.40 %    Eosinophils 0.90 0.00 - 6.90 %    Basophils 0.10 0.00 - 1.80 %    Immature Granulocytes 0.40 0.00 - 0.90 %    Nucleated RBC 0.00 /100 WBC    Neutrophils (Absolute) 7.78 (H) 2.00 - 7.15 K/uL    Lymphs (Absolute) 2.10 1.00 - 4.80 K/uL "    Monos (Absolute) 0.51 0.00 - 0.85 K/uL    Eos (Absolute) 0.10 0.00 - 0.51 K/uL    Baso (Absolute) 0.01 0.00 - 0.12 K/uL    Immature Granulocytes (abs) 0.04 0.00 - 0.11 K/uL    NRBC (Absolute) 0.00 K/uL   CRP QUANTITIVE (NON-CARDIAC)   Result Value Ref Range    Stat C-Reactive Protein 2.72 (H) 0.00 - 0.75 mg/dL   Sed Rate   Result Value Ref Range    Sed Rate Westergren 31 (H) 0 - 25 mm/hour   Basic Metabolic Panel   Result Value Ref Range    Sodium 141 135 - 145 mmol/L    Potassium 4.0 3.6 - 5.5 mmol/L    Chloride 104 96 - 112 mmol/L    Co2 27 20 - 33 mmol/L    Glucose 124 (H) 65 - 99 mg/dL    Bun 12 8 - 22 mg/dL    Creatinine 0.93 0.50 - 1.40 mg/dL    Calcium 9.3 8.5 - 10.5 mg/dL    Anion Gap 10.0 7.0 - 16.0   TROPONIN   Result Value Ref Range    Troponin T 27 (H) 6 - 19 ng/L   proBrain Natriuretic Peptide, NT   Result Value Ref Range    NT-proBNP 163 (H) 0 - 125 pg/mL   ESTIMATED GFR   Result Value Ref Range    GFR (CKD-EPI) 75 >60 mL/min/1.73 m 2   EKG   Result Value Ref Range    Report       [facility] Emergency Dept.    Test Date:  2022-10-22  Pt Name:    ADELINA MILLAN                Department: ER  MRN:        [id number]                      Room:       [facility]  Gender:     Female                       Technician: [id number]  :        1975                   Requested By:PATY MILLARD  Order #:    [id number]                    Reading MD: PATY MILLARD MD    Measurements  Intervals                                Axis  Rate:       77                           P:          63  ND:         127                          QRS:        48  QRSD:       99                           T:          18  QT:         370  QTc:        419    Interpretive Statements  Sinus rhythm  Atrial premature complex  Probable left atrial enlargement  Low voltage, precordial leads  Compared to ECG 10/16/2022 17:06:23  No significant changes  Electronically Signed On 10- 0:42:27 PDT by PATY MILLARD  "MD         All labs reviewed by me.      RADIOLOGY  DX-CHEST-PORTABLE (1 VIEW)   Final Result      Cardiac silhouette enlargement. Possible mild vascular congestion.      DX-THORACIC SPINE-2 VIEWS   Final Result      No acute abnormality.      DX-LUMBAR SPINE-2 OR 3 VIEWS   Final Result      No acute abnormality.            COURSE & MEDICAL DECISION MAKING  Pertinent Labs & Imaging studies reviewed. (See chart for details)    11:04 PM - Patient seen and examined at bedside.         Medical Decision Making: Patient presents with worsening back pain she is elevated inflammatory markers she is reporting urinary incontinence and saddle anesthesia.  Patient chronically ill multiple doses of steroids recently is at risk for possible infectious process resulting in cauda equina.  She is also having worsening tachypnea and shortness of breath with likely COPD exacerbation..  I discussed case at this point with hospitalist Dr. Quiroga.  Patient will require hospitalization for ongoing evaluation and treatment.  /50   Pulse 80   Temp 36.4 °C (97.5 °F) (Temporal)   Resp (!) 22   Ht 1.549 m (5' 1\")   Wt (!) 128 kg (282 lb 3 oz)   LMP  (LMP Unknown)   SpO2 99%   BMI 53.32 kg/m²           FINAL IMPRESSION  1. Pain, upper back Active   2. Acute exacerbation of chronic obstructive pulmonary disease (COPD) (HCC) Active   3.  Saddle anesthesia  4.  Urinary incontinence  5.  Elevated inflammatory markers    This dictation has been created using voice recognition software and/or scribes. The accuracy of the dictation is limited by the abilities of the software and the expertise of the scribes. I expect there may be some errors of grammar and possibly content. I made every attempt to manually correct the errors within my dictation. However, errors related to voice recognition software and/or scribes may still exist and should be interpreted within the appropriate context.              "

## 2022-10-23 NOTE — THERAPY
Occupational Therapy Contact Note    Patient Name: Lorene Haro  Age:  47 y.o., Sex:  female  Medical Record #: [id number]  Today's Date: 10/23/2022    Per chart review, Pt pending C-spine MRI. Will hold OT evaluation until results of MRI are received. Will attempt as able and appropriate.       10/23/22 1031   Initial Contact Note    Initial Contact Note Order Received and Verified, Occupational Therapy Evaluation in Progress with Full Report to Follow.   Interdisciplinary Plan of Care Collaboration   IDT Collaboration with  Physical Therapist;Other (See Comments)  (per chart review, pt pending MRI of the c-spine)   Collaboration Comments Per chart review pt pending C-spine MRI, will hold OT evaluation at this time and will re-attempt as appropriate pending MRI results.   Session Information   Date / Session Number  10/23 #1 - Attempted; NEEDS FLORA Gonsalez, OTD, OTR/L, CKTP

## 2022-10-23 NOTE — ED NOTES
Dr. Hines notified pt was more tachypneic at rest. O2 sat remain 99% on 2lpm oxygen. No new orders at this time.

## 2022-10-23 NOTE — H&P
"Hospital Medicine History & Physical Note    Date of Service  10/23/2022    Primary Care Physician  Heriberto Sharp M.D.    Consultants  None    Code Status  Full Code    Chief Complaint  Chief Complaint   Patient presents with    Back Pain     Began x2 days ago, upper cervical back pain radiates down thoracic/lumbar region, hx back pain, prior diagnosis of C4 and S1 nerve damage       History of Presenting Illness  Lorene Haro is a 47 y.o. female who presented 10/22/2022 with shortness of breath and upper back pain.    She has a history of COPD on 3 L of oxygen at home. Recently discharged from [facility] ED 1 week ago.  Presented for cough wheezing and low back pain.  She she was treated for COPD exacerbation and discharged with prednisone and duo nebs and a Z-Romario.  She continues to take prednisone.    For the last 3 days she has noted neck pain close to where her \" C2-C4\" area is and that it travels down to her lower back.  She describes it as a muscle pain and not shooting pain.  Denies any trauma and states that this has started suddenly.  She complains of a mild numbness area around her groin but denies urinary and fecal incontinence.  She states that she has been having to urinate more frequently than usual but this has been going on for the last 2 years.  In addition, she reports progressively worsening shortness of breath and cough with white sputum.  She has had the symptoms since she had her recent pneumonia and was getting better but now feels that she is getting worse.  Denies fever and chills.  Denies using alcohol and illicit drugs.  Denies recent tobacco use and states that she has quit smoking.    In ED, patient found to be tachypneic.  Remarkable labs include neutrophilia, troponin 27, , CRP 2.72.  Chest x-ray, x-ray lumbar spine x-ray thoracic spine negative for acute pathology.    I discussed the plan of care with patient.    Review of Systems  Review of Systems   Constitutional:  " Positive for malaise/fatigue.   HENT: Negative.     Eyes: Negative.    Respiratory:  Positive for cough and shortness of breath.    Cardiovascular: Negative.    Gastrointestinal: Negative.    Genitourinary: Negative.    Musculoskeletal:  Positive for neck pain.   Skin: Negative.    Endo/Heme/Allergies: Negative.    Psychiatric/Behavioral: Negative.       Past Medical History   has a past medical history of A-fib ([facility]), Asthma, Bipolar 2 disorder ([facility]), Chickenpox, Hypertension, On home oxygen therapy, Other psychological stress, Schizophrenic disorder ([facility]), and Yeast infection of the skin (04/01/2021).    Surgical History   has a past surgical history that includes hysterectomy laparoscopy; colectomy; cholecystectomy; and knee arthroscopy (Left).     Family History  family history includes Cancer in her sister; No Known Problems in her mother.   Family history reviewed with patient. There is no family history that is pertinent to the chief complaint.     Social History   reports that she quit smoking 11 days ago. Her smoking use included cigarettes. She smoked an average of .25 packs per day. She has never used smokeless tobacco. She reports that she does not drink alcohol and does not use drugs.    Allergies  Allergies   Allergen Reactions    Penicillin G Rash and Itching     pt reports that she gets a rash all over her body and gets itchy    Amoxicillin Rash and Itching     Tolerates cephalosporins, pt reports that she gets a rash all over her body and gets itchy       Medications  Prior to Admission Medications   Prescriptions Last Dose Informant Patient Reported? Taking?   albuterol 108 (90 Base) MCG/ACT Aero Soln inhalation aerosol  Patient No No   Sig: Inhale 2 Puffs every 6 hours as needed for Shortness of Breath.   albuterol 108 (90 Base) MCG/ACT Aero Soln inhalation aerosol   No No   Sig: Inhale 2 Puffs every 6 hours as needed for Shortness of Breath.   aripiprazole (ABILIFY) 30 MG tablet  Patient Yes No    Sig: Take 30 mg by mouth every morning.   atorvastatin (LIPITOR) 20 MG Tab  Patient Yes No   Sig: Take 20 mg by mouth at bedtime.   azithromycin (ZITHROMAX) 250 MG Tab   No No   Sig: Take two tabs by mouth on day one, then one tab by mouth daily on days 2-5.   azithromycin (ZITHROMAX) 250 MG Tab   No No   Sig: Take two tabs by mouth on day one, then one tab by mouth daily on days 2-5.   famotidine (PEPCID) 20 MG Tab   No No   Sig: Take 1 Tablet by mouth 2 times a day.   fluticasone-salmeterol (ADVAIR) 100-50 MCG/ACT AEROSOL POWDER, BREATH ACTIVATED  Patient No No   Sig: Inhale 1 Puff every 12 hours.   furosemide (LASIX) 40 MG Tab  Patient No No   Sig: Take 1 Tablet by mouth every day for 30 days.   methocarbamol (ROBAXIN) 750 MG Tab  Patient No No   Sig: Take 1 Tablet by mouth 4 times a day. Take as needed for muscle spasm/strain.   methylPREDNISolone (MEDROL DOSEPAK) 4 MG Tablet Therapy Pack   No No   Sig: Use as directed   montelukast (SINGULAIR) 10 MG Tab  Patient Yes No   Sig: Take 10 mg by mouth every morning.   nitroglycerin (NITROSTAT) 0.4 MG SL Tab  Patient No No   Sig: Place 1 tablet under the tongue as needed for Chest Pain.   nystatin (MYCOSTATIN) 276724 UNIT/GM Cream topical cream   Yes No   Sig: Apply 1 Application topically 2 times a day.   potassium chloride SA (K-DUR) 10 MEQ Tab CR  Patient No No   Sig: Take 1 Tablet by mouth every day for 30 days.   predniSONE (DELTASONE) 50 MG Tab   No No   Sig: Take 1 Tablet by mouth every day.   traZODone (DESYREL) 50 MG Tab  Patient No No   Sig: Take 1 Tablet by mouth at bedtime.      Facility-Administered Medications: None       Physical Exam  Temp:  [36.4 °C (97.5 °F)] 36.4 °C (97.5 °F)  Pulse:  [74-88] 74  Resp:  [16-23] 23  BP: (113-142)/(50-87) 113/50  SpO2:  [96 %-100 %] 99 %  Blood Pressure: 113/50   Temperature: 36.4 °C (97.5 °F)   Pulse: 74   Respiration: (!) 23   Pulse Oximetry: 99 %       Physical Exam  Constitutional:       Appearance: Normal  appearance. She is obese.   HENT:      Head: Normocephalic.      Nose: Nose normal.      Mouth/Throat:      Mouth: Mucous membranes are moist.   Eyes:      Pupils: Pupils are equal, round, and reactive to light.   Neck:      Comments: Range of motion intact in her neck.  Able to turn her head to the sides and move up and down without any pain  Cardiovascular:      Rate and Rhythm: Normal rate and regular rhythm.   Pulmonary:      Effort: Pulmonary effort is normal.      Comments: Expiratory wheezing is heard diffusely  Saturating 97% with nasal cannula   Abdominal:      General: Abdomen is flat. Bowel sounds are normal.      Palpations: Abdomen is soft.   Musculoskeletal:         General: Swelling present.      Cervical back: Neck supple.   Skin:     General: Skin is warm.      Comments: DP 2+ BL    Neurological:      General: No focal deficit present.      Mental Status: She is alert and oriented to person, place, and time. Mental status is at baseline.   Psychiatric:         Mood and Affect: Mood normal.         Behavior: Behavior normal.         Thought Content: Thought content normal.         Judgment: Judgment normal.       Laboratory:  Recent Labs     10/22/22  2329   WBC 10.5   RBC 4.52   HEMOGLOBIN 11.8*   HEMATOCRIT 39.1   MCV 86.5   MCH 26.1*   MCHC 30.2*   RDW 52.8*   PLATELETCT 210   MPV 11.3     Recent Labs     10/22/22  2329   SODIUM 141   POTASSIUM 4.0   CHLORIDE 104   CO2 27   GLUCOSE 124*   BUN 12   CREATININE 0.93   CALCIUM 9.3     Recent Labs     10/22/22  2329   GLUCOSE 124*         Recent Labs     10/22/22  2329   NTPROBNP 163*         Recent Labs     10/22/22  2329   TROPONINT 27*       Imaging:  DX-CHEST-PORTABLE (1 VIEW)   Final Result      Cardiac silhouette enlargement. Possible mild vascular congestion.      DX-THORACIC SPINE-2 VIEWS   Final Result      No acute abnormality.      DX-LUMBAR SPINE-2 OR 3 VIEWS   Final Result      No acute abnormality.          X-Ray:  I have personally  reviewed the images and compared with prior images.    Assessment/Plan:  Justification for Admission Status  I anticipate this patient is appropriate for observation status at this time because patient has mild COPD exacerbation and nonspecific back pain.      * COPD with acute exacerbation (HCC)- (present on admission)  Assessment & Plan  Sen  Solu-Medrol  Can hold antibiotics for now.  Recently completed course of antibiotics and has no leukocytosis.  No fever or chills.  Oxygen, keep O2 over 88%    Neck pain  Assessment & Plan  Although patient has elevated CRP, I suspect that patient's symptoms are more likely due to musculoskeletal pain versus an abscess.  However will order MRI of her spine for a.m., I do believe that she needs it overnight.  Intact dorsalis pedis pulses.  She denies fever chills.  Denies illicit drug use.  Physical therapy Occupational Therapy  Pain medications as needed    Hyperlipidemia  Assessment & Plan  Continue lipitor     Schizophrenia (HCC)- (present on admission)  Assessment & Plan  Continue home medications      VTE prophylaxis: enoxaparin ppx

## 2022-10-23 NOTE — ED NOTES
Pt aox4, skin pink warm and dry, airway patent, rr tacypneic, on 2lpm oxygen due to COPD. Vitals stable. Pt with no new complaints at this time. Transports with transport team in stable condition.

## 2022-10-23 NOTE — ED NOTES
Pt resting, airway patent, rr tachypenic at 28, 2lpm oxygen, equal chest rise and fall. No new distress noted. Call light within reach.

## 2022-10-23 NOTE — CONSULTS
Pulmonary Consultation    Date of consult: 10/23/2022    Referring Physician  Sharri Loera M.D.    Reason for Consultation  COPD order set     History of Presenting Illness  47 y.o. female who presented 10/22/2022 with neck pain.  Patient has a history of asthma and has been compliant with her Advair.  She also has a history of KATHIA on home CPAP.  She has a 30 pack year smoking history and is currently smoking.    She was admitted 10/1-10/2 with a respiratory failure, thought to be 2/2 asthma exacerbation and HF. She was seen again 10/4-10/7 with continued worsening of respiratory failure, treated for pna.   Seen again in the ER 10/12 for respiratory failure, Dced with prednisone and azithro  Seen again in the ER 10/14 for wheezing and respiratory failure and Dced with the same plan.     Patient had pulmonary function test done in [place] which showed no obstruction with significant bronchodilators response.  She had a mildly reduced TLC, consistent with mild restriction.  Preserved DLCO.  CT shows no signs of emphysema.    On my exam today, she was communicative, but sleepy.  She states that she has been using her Advair.  She thinks that maybe her asthma is getting worse.    Code Status  Full Code    Review of Systems   Constitutional:  Negative for chills and fever.   Respiratory:  Positive for shortness of breath. Negative for cough and wheezing.    Cardiovascular:  Negative for chest pain and leg swelling.   Musculoskeletal:  Positive for neck pain.     Past Medical History   has a past medical history of A-fib ([facility]), Asthma, Bipolar 2 disorder ([facility]), Chickenpox, Hypertension, On home oxygen therapy, Other psychological stress, Schizophrenic disorder ([facility]), and Yeast infection of the skin (04/01/2021).    She has no past medical history of CAD (coronary artery disease), Cancer ([facility]), Congestive heart failure ([facility]), Diabetes, Liver disease, Renal disorder, Seizure disorder ([facility]), or Stroke ([facility]).    Surgical  History   has a past surgical history that includes hysterectomy laparoscopy; colectomy; cholecystectomy; and knee arthroscopy (Left).    Family History  family history includes Cancer in her sister; No Known Problems in her mother.    Social History   reports that she quit smoking 11 days ago. Her smoking use included cigarettes. She smoked an average of .25 packs per day. She has never used smokeless tobacco. She reports that she does not drink alcohol and does not use drugs.    Medications  Home Medications       Reviewed by Robbie Salazar (Pharmacy Tech) on 10/23/22 at 0112  Med List Status: Complete     Medication Last Dose Status   albuterol 108 (90 Base) MCG/ACT Aero Soln inhalation aerosol 10/22/2022 Active   aripiprazole (ABILIFY) 30 MG tablet 10/22/2022 Active   atorvastatin (LIPITOR) 20 MG Tab 10/21/2022 Active   azithromycin (ZITHROMAX) 250 MG Tab Not started Active   azithromycin (ZITHROMAX) 250 MG Tab Not started Active   famotidine (PEPCID) 20 MG Tab 10/22/2022 Active   fluticasone-salmeterol (ADVAIR) 100-50 MCG/ACT AEROSOL POWDER, BREATH ACTIVATED 10/22/2022 Active   furosemide (LASIX) 40 MG Tab 10/21/2022 Active   methocarbamol (ROBAXIN) 750 MG Tab PRN Active   methylPREDNISolone (MEDROL DOSEPAK) 4 MG Tablet Therapy Pack Not started Active   montelukast (SINGULAIR) 10 MG Tab 10/21/2022 Active   nystatin (MYCOSTATIN) 344184 UNIT/GM Cream topical cream 10/21/2022 Active   potassium chloride SA (K-DUR) 10 MEQ Tab CR 10/21/2022 Active   predniSONE (DELTASONE) 50 MG Tab Not started Active   traZODone (DESYREL) 50 MG Tab 10/21/2022 Active                  Current Facility-Administered Medications   Medication Dose Route Frequency Provider Last Rate Last Admin    ARIPiprazole (Abilify) tablet 30 mg  30 mg Oral QAM Nader Quiroga M.D.   30 mg at 10/23/22 0527    atorvastatin (LIPITOR) tablet 20 mg  20 mg Oral QHS Nader Quiroga M.D.        furosemide (LASIX) tablet 40 mg  40 mg Oral DAILY  Nader Quiroga M.D.   40 mg at 10/23/22 0528    montelukast (SINGULAIR) tablet 10 mg  10 mg Oral Q EVENING Nader Quiroga M.D.        traZODone (DESYREL) tablet 50 mg  50 mg Oral QHS Nader Quiroga M.D.        Respiratory Therapy Consult   Nebulization Continuous RT Nader Quiroga M.D.        ipratropium-albuterol (DUONEB) nebulizer solution  3 mL Nebulization Q4HRS (RT) Nader Quiroga M.D.   3 mL at 10/23/22 1305    methylPREDNISolone (SOLU-MEDROL) 40 MG injection 40 mg  40 mg Intravenous Q12HRS Nader Quiroga M.D.   40 mg at 10/23/22 0527    acetaminophen (Tylenol) tablet 650 mg  650 mg Oral Q6HRS PRN Nader Quiroga M.D.        enoxaparin (Lovenox) inj 60 mg  60 mg Subcutaneous Q12HRS Nader Quiroga M.D.   60 mg at 10/23/22 0527    budesonide-formoterol (SYMBICORT) 80-4.5 MCG/ACT inhaler 2 Puff  2 Puff Inhalation BID (RT) Nader Quiroga M.D.           Allergies  Allergies   Allergen Reactions    Penicillin G Rash and Itching     pt reports that she gets a rash all over her body and gets itchy    Amoxicillin Rash and Itching     Tolerates cephalosporins, pt reports that she gets a rash all over her body and gets itchy       Vital Signs last 24 hours  Temp:  [36.2 °C (97.2 °F)-37.2 °C (98.9 °F)] 36.3 °C (97.3 °F)  Pulse:  [69-88] 71  Resp:  [14-29] 18  BP: ()/(50-87) 127/71  SpO2:  [93 %-100 %] 93 %    Physical Exam  Physical Exam  Constitutional:       Appearance: She is obese.   HENT:      Head: Normocephalic.   Eyes:      Conjunctiva/sclera: Conjunctivae normal.   Cardiovascular:      Rate and Rhythm: Normal rate and regular rhythm.   Pulmonary:      Comments: Poor air movement bilaterally, no wheezing  Abdominal:      Palpations: Abdomen is soft.   Skin:     General: Skin is warm and dry.   Neurological:      Comments: Lifting arms off bed to command   Psychiatric:         Mood and Affect: Mood normal.         Behavior: Behavior normal.     Fluids  No intake or output  data in the 24 hours ending 10/23/22 1332    Laboratory  Recent Results (from the past 48 hour(s))   CBC WITH DIFFERENTIAL    Collection Time: 10/22/22 11:29 PM   Result Value Ref Range    WBC 10.5 4.8 - 10.8 K/uL    RBC 4.52 4.20 - 5.40 M/uL    Hemoglobin 11.8 (L) 12.0 - 16.0 g/dL    Hematocrit 39.1 37.0 - 47.0 %    MCV 86.5 81.4 - 97.8 fL    MCH 26.1 (L) 27.0 - 33.0 pg    MCHC 30.2 (L) 33.6 - 35.0 g/dL    RDW 52.8 (H) 35.9 - 50.0 fL    Platelet Count 210 164 - 446 K/uL    MPV 11.3 9.0 - 12.9 fL    Neutrophils-Polys 73.90 (H) 44.00 - 72.00 %    Lymphocytes 19.90 (L) 22.00 - 41.00 %    Monocytes 4.80 0.00 - 13.40 %    Eosinophils 0.90 0.00 - 6.90 %    Basophils 0.10 0.00 - 1.80 %    Immature Granulocytes 0.40 0.00 - 0.90 %    Nucleated RBC 0.00 /100 WBC    Neutrophils (Absolute) 7.78 (H) 2.00 - 7.15 K/uL    Lymphs (Absolute) 2.10 1.00 - 4.80 K/uL    Monos (Absolute) 0.51 0.00 - 0.85 K/uL    Eos (Absolute) 0.10 0.00 - 0.51 K/uL    Baso (Absolute) 0.01 0.00 - 0.12 K/uL    Immature Granulocytes (abs) 0.04 0.00 - 0.11 K/uL    NRBC (Absolute) 0.00 K/uL   CRP QUANTITIVE (NON-CARDIAC)    Collection Time: 10/22/22 11:29 PM   Result Value Ref Range    Stat C-Reactive Protein 2.72 (H) 0.00 - 0.75 mg/dL   Sed Rate    Collection Time: 10/22/22 11:29 PM   Result Value Ref Range    Sed Rate Westergren 31 (H) 0 - 25 mm/hour   Basic Metabolic Panel    Collection Time: 10/22/22 11:29 PM   Result Value Ref Range    Sodium 141 135 - 145 mmol/L    Potassium 4.0 3.6 - 5.5 mmol/L    Chloride 104 96 - 112 mmol/L    Co2 27 20 - 33 mmol/L    Glucose 124 (H) 65 - 99 mg/dL    Bun 12 8 - 22 mg/dL    Creatinine 0.93 0.50 - 1.40 mg/dL    Calcium 9.3 8.5 - 10.5 mg/dL    Anion Gap 10.0 7.0 - 16.0   TROPONIN    Collection Time: 10/22/22 11:29 PM   Result Value Ref Range    Troponin T 27 (H) 6 - 19 ng/L   proBrain Natriuretic Peptide, NT    Collection Time: 10/22/22 11:29 PM   Result Value Ref Range    NT-proBNP 163 (H) 0 - 125 pg/mL   ESTIMATED GFR     Collection Time: 10/22/22 11:29 PM   Result Value Ref Range    GFR (CKD-EPI) 75 >60 mL/min/1.73 m 2   EKG    Collection Time: 10/22/22 11:31 PM   Result Value Ref Range    Report       [facility] Emergency Dept.    Test Date:  2022-10-22  Pt Name:    ADELINA MILLAN                Department: ER  MRN:        [id number]                      Room:       [facility]  Gender:     Female                       Technician: [id number]  :        1975                   Requested By:PATY MILLARD  Order #:    [id number]                    Reading MD: PATY MILLARD MD    Measurements  Intervals                                Axis  Rate:       77                           P:          63  WI:         127                          QRS:        48  QRSD:       99                           T:          18  QT:         370  QTc:        419    Interpretive Statements  Sinus rhythm  Atrial premature complex  Probable left atrial enlargement  Low voltage, precordial leads  Compared to ECG 10/16/2022 17:06:23  No significant changes  Electronically Signed On 10- 0:42:27 PDT by PATY MILLARD MD     Blood Culture,Hold    Collection Time: 10/22/22 11:39 PM   Result Value Ref Range    Blood Culture Hold Collected        Imaging  CXR - reviewed     Assessment/Plan  Severe asthma with acute exacerbation, unspecified whether persistent- (present on admission)  Assessment & Plan  Patient does not have a diagnosis on COPD, she has no obstruction on PFTs done 1 year ago    -continue with symbicort, although I do not think that she has the ability to inhale it at this time due to weakness   -continue with solumedrol 40mg IV BID   -continue with ATC nebs   -stat ABG to assess for retention, if she is retaining CO2, I would recommend upgrade to ICU   -titrate O2 88-92%      Discussed patient condition and risk of morbidity and/or mortality with Hospitalist and Patient.        Hanna Kerr,    Pulmonary  and Critical Care

## 2022-10-23 NOTE — PROGRESS NOTES
4 Eyes Skin Assessment Completed by JC Salguero and JC Stoddard.    Head WDL  Ears WDL  Nose WDL  Mouth WDL  Neck WDL  Breast/Chest Redness and Rash  Shoulder Blades WDL  Spine WDL  (R) Arm/Elbow/Hand WDL  (L) Arm/Elbow/Hand WDL  Abdomen WDL  Groin Redness and Blanching  Scrotum/Coccyx/Buttocks WDL  (R) Leg Redness and Edema flaky  (L) Leg Redness and Edema flaky  (R) Heel/Foot/Toe Discoloration  (L) Heel/Foot/Toe Discoloration          Devices In Places Blood Pressure Cuff, Pulse Ox, and Nasal Cannula      Interventions In Place Gray Ear Foams and Pillows    Possible Skin Injury Yes    Pictures Uploaded Into Epic No, needs to be completed  Wound Consult Placed N/A  RN Wound Prevention Protocol Ordered No

## 2022-10-23 NOTE — ASSESSMENT & PLAN NOTE
Although patient has elevated CRP, I suspect that patient's symptoms are more likely due to musculoskeletal pain versus an abscess.  Physical therapy Occupational Therapy  Pain medications as needed  10/23: unable to fit in MRI machine on attempt today.  Will need outpatient open MRI.  BCs x2 ordered.  Sed rate  procalcitonin negative  No need for abx

## 2022-10-23 NOTE — PROGRESS NOTES
Transport sent to get patient. Per dispatch MD in room and patient not able to come down at this time.

## 2022-10-23 NOTE — RESPIRATORY CARE
LUNG DISEASE EDUCATION by COPD CLINICAL EDUCATOR  10/23/2022 at 7:54 AM by Maylin Sullivan, RRT     Pt busy revisit

## 2022-10-23 NOTE — PROGRESS NOTES
"Hospital Medicine Daily Progress Note    Date of Service  10/23/2022    Chief Complaint  Lorene Haro is a 47 y.o. female admitted 10/22/2022 with asthma exacerbation and hypoxia.    Hospital Course  Lorene Haro is a 47 y.o. female who presented 10/22/2022 with shortness of breath and upper back pain.     She has a history of COPD on 3 L of oxygen at home. Recently discharged from [facility] ED 1 week ago.  Presented for cough wheezing and low back pain.  She she was treated for COPD exacerbation and discharged with prednisone and duo nebs and a Z-Romario.  She continues to take prednisone.     For the last 3 days she has noted neck pain close to where her \" C2-C4\" area is and that it travels down to her lower back.  She describes it as a muscle pain and not shooting pain.  Denies any trauma and states that this has started suddenly.  She complains of a mild numbness area around her groin but denies urinary and fecal incontinence.  She states that she has been having to urinate more frequently than usual but this has been going on for the last 2 years.  In addition, she reports progressively worsening shortness of breath and cough with white sputum.  She has had the symptoms since she had her recent pneumonia and was getting better but now feels that she is getting worse.  Denies fever and chills.  Denies using alcohol and illicit drugs.  Denies recent tobacco use and states that she has quit smoking.     In ED, patient found to be tachypneic.  Remarkable labs include neutrophilia, troponin 27, , CRP 2.72.  Chest x-ray, x-ray lumbar spine x-ray thoracic spine negative for acute pathology.    Interval Problem Update  10/23: Patient having difficulty wakening.  She does not take in deep breaths but did use spirometer with RT at 1000 cc which was reassuring.  She does have tight nests in the chest no audible wheeze appreciated.  Chest poor air movement.  She does have a history of hypoventilation obesity " syndrome.  She also has a history of obstructive sleep apnea on sleep study and is still waiting for her CPAP machine.  I have ordered for CPAP nightly with RT.  Patient also is on Lovenox 60 mg SQ twice daily.  She states she has atrial fibrillation but I do not see this in her chart she does have a loop recorder in place seen on chest x-ray.  Patient was unable to get MRI cervical thoracic or lumbar spine today due to inability to fit in the MRI machine.  She would need an open MRI.  Patient remains on 3 L/min nasal cannula her baseline.  Of note multiple admissions recently and ER visits for hypercapnia 10 4-10 7 diagnosed with pneumonia.  This chest x-ray no pneumonia seen.    I have discussed this patient's plan of care and discharge plan at IDT rounds today with Case Management, Nursing, Nursing leadership, and other members of the IDT team.    Consultants/Specialty  pulmonary    Code Status  Full Code    Disposition  Patient is not medically cleared for discharge.   Anticipate discharge to to home with close outpatient follow-up.  I have placed the appropriate orders for post-discharge needs.    Review of Systems  Review of Systems   Unable to perform ROS: Mental acuity      Physical Exam  Temp:  [36.2 °C (97.2 °F)-37.2 °C (98.9 °F)] 36.3 °C (97.3 °F)  Pulse:  [67-88] 67  Resp:  [14-29] 17  BP: ()/(50-87) 108/58  SpO2:  [93 %-100 %] 94 %    Physical Exam  Vitals and nursing note reviewed.   Constitutional:       General: She is not in acute distress.     Appearance: She is well-developed. She is obese. She is not diaphoretic.   HENT:      Head: Normocephalic and atraumatic.      Nose: Nose normal.      Mouth/Throat:      Pharynx: No oropharyngeal exudate.   Eyes:      General: No scleral icterus.        Right eye: No discharge.         Left eye: No discharge.      Conjunctiva/sclera: Conjunctivae normal.      Pupils: Pupils are equal, round, and reactive to light.   Neck:      Thyroid: No thyromegaly.       Vascular: No JVD.      Trachea: No tracheal deviation.   Cardiovascular:      Rate and Rhythm: Normal rate and regular rhythm.      Heart sounds: Normal heart sounds. No murmur heard.    No friction rub. No gallop.   Pulmonary:      Effort: No respiratory distress.      Breath sounds: Normal breath sounds. No stridor. No wheezing or rales.      Comments: Poor inspiratory effort on exam.  Patient appears very drowsy on exam.  Demonstrating obstructive sleep apnea on exam.  She can hardly stay awake long enough to answer questions.    No wheeze heard on exam however patient has poor effort.  Chest:      Chest wall: No tenderness.   Abdominal:      General: Bowel sounds are normal. There is no distension.      Palpations: Abdomen is soft. There is no mass.      Tenderness: There is no abdominal tenderness. There is no guarding or rebound.   Musculoskeletal:         General: No tenderness. Normal range of motion.      Cervical back: Normal range of motion and neck supple.   Lymphadenopathy:      Cervical: No cervical adenopathy.   Skin:     General: Skin is warm and dry.      Findings: No erythema or rash.   Neurological:      General: No focal deficit present.      Mental Status: She is oriented to person, place, and time.      Cranial Nerves: No cranial nerve deficit.      Motor: No abnormal muscle tone.      Coordination: Coordination normal.       Fluids  No intake or output data in the 24 hours ending 10/23/22 1847    Laboratory  Recent Labs     10/22/22  2329 10/23/22  1733   WBC 10.5 8.2   RBC 4.52 4.47   HEMOGLOBIN 11.8* 11.8*   HEMATOCRIT 39.1 39.3   MCV 86.5 87.9   MCH 26.1* 26.4*   MCHC 30.2* 30.0*   RDW 52.8* 52.7*   PLATELETCT 210 213   MPV 11.3 11.1     Recent Labs     10/22/22  2329 10/23/22  1733   SODIUM 141 141   POTASSIUM 4.0 4.6   CHLORIDE 104 104   CO2 27 28   GLUCOSE 124* 152*   BUN 12 13   CREATININE 0.93 1.04   CALCIUM 9.3 9.1                   Imaging  DX-CHEST-PORTABLE (1 VIEW)   Final  Result      Cardiac silhouette enlargement. Possible mild vascular congestion.      DX-THORACIC SPINE-2 VIEWS   Final Result      No acute abnormality.      DX-LUMBAR SPINE-2 OR 3 VIEWS   Final Result      No acute abnormality.           Assessment/Plan  * Obesity hypoventilation syndrome (HCC)- (present on admission)  Assessment & Plan  Patient should qualify for Bipap ordered through the hospital given her BMI, will check ABG for CO2 level.  Pulmonology consult pending.  KATHIA on recent sleep study, but patient states no CPAP at home yet.  Ordered CPAP qhs with RT.  ABG  CXR with cardiomegaly.    Status post placement of implantable loop recorder- (present on admission)  Assessment & Plan  Patient states history of afib, but reviewed all EKGs, no afib seen.  Not clear why on lovenox bid dosing.  No home AC listed.  Will need to verify.      Severe asthma with acute exacerbation, unspecified whether persistent- (present on admission)  Assessment & Plan  Multiple admissions for hypoventilation obesity syndrome and recently for pneumonia 10/4-10/7.  Multiple ER visits.      Neck pain  Assessment & Plan  Although patient has elevated CRP, I suspect that patient's symptoms are more likely due to musculoskeletal pain versus an abscess.  Physical therapy Occupational Therapy  Pain medications as needed  10/23: unable to fit in MRI machine on attempt today.  Will need outpatient open MRI.  BCs x2 ordered.  Sed rate  procalcitonin to risk stratify.    Hyperlipidemia  Assessment & Plan  Continue lipitor     Schizophrenia (HCC)- (present on admission)  Assessment & Plan  Continue home medications    KATHIA (obstructive sleep apnea)- (present on admission)  Assessment & Plan  Diagnosed on recent sleep study  Patient states no home CPAP yet.  Previously on CPAP, but mask broken.  CPAP qhs while in hospital ordered.  Likely can see if pulmonary or CM can order CPAP this admission.       VTE prophylaxis: therapeutic anticoagulation  with lovenox bid.    I have performed a physical exam and reviewed and updated ROS and Plan today (10/23/2022). In review of yesterday's note (10/22/2022), there are no changes except as documented above.

## 2022-10-23 NOTE — CARE PLAN
Problem: Knowledge Deficit - Standard  Goal: Patient and family/care givers will demonstrate understanding of plan of care, disease process/condition, diagnostic tests and medications  Outcome: Progressing     Problem: Knowledge Deficit - COPD  Goal: Patient/significant other demonstrates understanding of disease process, utilization of the Action Plan, medications and discharge instruction  Outcome: Progressing     Problem: Risk for Infection - COPD  Goal: Patient will remain free from signs and symptoms of infection  Outcome: Progressing     Problem: Nutrition - Advanced  Goal: Patient will display progressive weight gain toward goal have adequate food and fluid intake  Outcome: Progressing     Problem: Ineffective Airway Clearance  Goal: Patient will maintain patent airway with clear/clearing breath sounds  Outcome: Progressing     Problem: Impaired Gas Exchange  Goal: Patient will demonstrate improved ventilation and adequate oxygenation and participate in treatment regimen within the level of ability/situation.  Outcome: Progressing     Problem: Risk for Aspiration  Goal: Patient's risk for aspiration will be absent or decrease  Outcome: Progressing     Problem: Self Care  Goal: Patient will have the ability to perform ADLs independently or with assistance (bathe, groom, dress, toilet and feed)  Outcome: Progressing     Problem: Pain - Standard  Goal: Alleviation of pain or a reduction in pain to the patient’s comfort goal  Outcome: Progressing   The patient is Stable - Low risk of patient condition declining or worsening    Shift Goals  Clinical Goals: RT treatments  Patient Goals: rest    Progress made toward(s) clinical / shift goals:  Patient updated on POC    Patient is not progressing towards the following goals:

## 2022-10-24 ENCOUNTER — APPOINTMENT (OUTPATIENT)
Dept: RADIOLOGY | Facility: MEDICAL CENTER | Age: 47
DRG: 202 | End: 2022-10-24
Payer: MEDICAID

## 2022-10-24 PROBLEM — E66.813 CLASS 3 SEVERE OBESITY IN ADULT (HCC): Status: ACTIVE | Noted: 2022-10-24

## 2022-10-24 PROBLEM — J96.01 ACUTE RESPIRATORY FAILURE WITH HYPOXIA AND HYPERCAPNIA (HCC): Status: ACTIVE | Noted: 2022-10-24

## 2022-10-24 PROBLEM — J96.02 ACUTE RESPIRATORY FAILURE WITH HYPOXIA AND HYPERCAPNIA (HCC): Status: ACTIVE | Noted: 2022-10-24

## 2022-10-24 PROBLEM — E66.01 CLASS 3 SEVERE OBESITY IN ADULT (HCC): Status: ACTIVE | Noted: 2022-10-24

## 2022-10-24 LAB
ANION GAP SERPL CALC-SCNC: 9 MMOL/L (ref 7–16)
APPEARANCE UR: CLEAR
BASOPHILS # BLD AUTO: 0 % (ref 0–1.8)
BASOPHILS # BLD: 0 K/UL (ref 0–0.12)
BILIRUB UR QL STRIP.AUTO: NEGATIVE
BUN SERPL-MCNC: 17 MG/DL (ref 8–22)
CALCIUM SERPL-MCNC: 9.5 MG/DL (ref 8.5–10.5)
CHLORIDE SERPL-SCNC: 104 MMOL/L (ref 96–112)
CO2 SERPL-SCNC: 30 MMOL/L (ref 20–33)
COLOR UR: YELLOW
COMMENT 1642: NORMAL
CREAT SERPL-MCNC: 1.14 MG/DL (ref 0.5–1.4)
D DIMER PPP IA.FEU-MCNC: <0.27 UG/ML (FEU) (ref 0–0.5)
EOSINOPHIL # BLD AUTO: 0 K/UL (ref 0–0.51)
EOSINOPHIL NFR BLD: 0 % (ref 0–6.9)
ERYTHROCYTE [DISTWIDTH] IN BLOOD BY AUTOMATED COUNT: 53.3 FL (ref 35.9–50)
ERYTHROCYTE [SEDIMENTATION RATE] IN BLOOD BY WESTERGREN METHOD: 45 MM/HOUR (ref 0–25)
FLUAV RNA SPEC QL NAA+PROBE: NEGATIVE
FLUBV RNA SPEC QL NAA+PROBE: NEGATIVE
GFR SERPLBLD CREATININE-BSD FMLA CKD-EPI: 59 ML/MIN/1.73 M 2
GLUCOSE SERPL-MCNC: 176 MG/DL (ref 65–99)
GLUCOSE UR STRIP.AUTO-MCNC: NEGATIVE MG/DL
HCT VFR BLD AUTO: 38.7 % (ref 37–47)
HGB BLD-MCNC: 11.3 G/DL (ref 12–16)
IMM GRANULOCYTES # BLD AUTO: 0.04 K/UL (ref 0–0.11)
IMM GRANULOCYTES NFR BLD AUTO: 0.5 % (ref 0–0.9)
KETONES UR STRIP.AUTO-MCNC: NEGATIVE MG/DL
LEUKOCYTE ESTERASE UR QL STRIP.AUTO: NEGATIVE
LYMPHOCYTES # BLD AUTO: 0.64 K/UL (ref 1–4.8)
LYMPHOCYTES NFR BLD: 8.7 % (ref 22–41)
MCH RBC QN AUTO: 25.9 PG (ref 27–33)
MCHC RBC AUTO-ENTMCNC: 29.2 G/DL (ref 33.6–35)
MCV RBC AUTO: 88.8 FL (ref 81.4–97.8)
MICRO URNS: NORMAL
MONOCYTES # BLD AUTO: 0.1 K/UL (ref 0–0.85)
MONOCYTES NFR BLD AUTO: 1.4 % (ref 0–13.4)
MORPHOLOGY BLD-IMP: NORMAL
NEUTROPHILS # BLD AUTO: 6.58 K/UL (ref 2–7.15)
NEUTROPHILS NFR BLD: 89.4 % (ref 44–72)
NITRITE UR QL STRIP.AUTO: NEGATIVE
NRBC # BLD AUTO: 0 K/UL
NRBC BLD-RTO: 0 /100 WBC
PH UR STRIP.AUTO: 5.5 [PH] (ref 5–8)
PLATELET # BLD AUTO: 198 K/UL (ref 164–446)
PLATELET BLD QL SMEAR: NORMAL
PMV BLD AUTO: 11.3 FL (ref 9–12.9)
POTASSIUM SERPL-SCNC: 4.6 MMOL/L (ref 3.6–5.5)
PROT UR QL STRIP: NEGATIVE MG/DL
RBC # BLD AUTO: 4.36 M/UL (ref 4.2–5.4)
RBC BLD AUTO: NORMAL
RBC UR QL AUTO: NEGATIVE
RSV RNA SPEC QL NAA+PROBE: NEGATIVE
SARS-COV-2 RNA RESP QL NAA+PROBE: NOTDETECTED
SODIUM SERPL-SCNC: 143 MMOL/L (ref 135–145)
SP GR UR STRIP.AUTO: 1.02
SPECIMEN SOURCE: NORMAL
TROPONIN T SERPL-MCNC: 19 NG/L (ref 6–19)
UROBILINOGEN UR STRIP.AUTO-MCNC: 0.2 MG/DL
WBC # BLD AUTO: 7.4 K/UL (ref 4.8–10.8)

## 2022-10-24 PROCEDURE — 97162 PT EVAL MOD COMPLEX 30 MIN: CPT

## 2022-10-24 PROCEDURE — 94660 CPAP INITIATION&MGMT: CPT

## 2022-10-24 PROCEDURE — 94640 AIRWAY INHALATION TREATMENT: CPT

## 2022-10-24 PROCEDURE — 93005 ELECTROCARDIOGRAM TRACING: CPT

## 2022-10-24 PROCEDURE — A9270 NON-COVERED ITEM OR SERVICE: HCPCS | Performed by: STUDENT IN AN ORGANIZED HEALTH CARE EDUCATION/TRAINING PROGRAM

## 2022-10-24 PROCEDURE — 94760 N-INVAS EAR/PLS OXIMETRY 1: CPT

## 2022-10-24 PROCEDURE — 84484 ASSAY OF TROPONIN QUANT: CPT

## 2022-10-24 PROCEDURE — 700101 HCHG RX REV CODE 250: Performed by: HOSPITALIST

## 2022-10-24 PROCEDURE — 85025 COMPLETE CBC W/AUTO DIFF WBC: CPT

## 2022-10-24 PROCEDURE — C9803 HOPD COVID-19 SPEC COLLECT: HCPCS | Performed by: INTERNAL MEDICINE

## 2022-10-24 PROCEDURE — 85379 FIBRIN DEGRADATION QUANT: CPT

## 2022-10-24 PROCEDURE — 0241U HCHG SARS-COV-2 COVID-19 NFCT DS RESP RNA 4 TRGT MIC: CPT

## 2022-10-24 PROCEDURE — A9270 NON-COVERED ITEM OR SERVICE: HCPCS

## 2022-10-24 PROCEDURE — 80048 BASIC METABOLIC PNL TOTAL CA: CPT

## 2022-10-24 PROCEDURE — 700111 HCHG RX REV CODE 636 W/ 250 OVERRIDE (IP): Performed by: STUDENT IN AN ORGANIZED HEALTH CARE EDUCATION/TRAINING PROGRAM

## 2022-10-24 PROCEDURE — 700102 HCHG RX REV CODE 250 W/ 637 OVERRIDE(OP)

## 2022-10-24 PROCEDURE — 700102 HCHG RX REV CODE 250 W/ 637 OVERRIDE(OP): Performed by: STUDENT IN AN ORGANIZED HEALTH CARE EDUCATION/TRAINING PROGRAM

## 2022-10-24 PROCEDURE — 36415 COLL VENOUS BLD VENIPUNCTURE: CPT

## 2022-10-24 PROCEDURE — 99233 SBSQ HOSP IP/OBS HIGH 50: CPT | Performed by: INTERNAL MEDICINE

## 2022-10-24 PROCEDURE — 71045 X-RAY EXAM CHEST 1 VIEW: CPT

## 2022-10-24 PROCEDURE — 94669 MECHANICAL CHEST WALL OSCILL: CPT

## 2022-10-24 PROCEDURE — 99232 SBSQ HOSP IP/OBS MODERATE 35: CPT | Performed by: INTERNAL MEDICINE

## 2022-10-24 PROCEDURE — 81003 URINALYSIS AUTO W/O SCOPE: CPT

## 2022-10-24 PROCEDURE — 770001 HCHG ROOM/CARE - MED/SURG/GYN PRIV*

## 2022-10-24 RX ORDER — IPRATROPIUM BROMIDE AND ALBUTEROL SULFATE 2.5; .5 MG/3ML; MG/3ML
3 SOLUTION RESPIRATORY (INHALATION)
Status: DISCONTINUED | OUTPATIENT
Start: 2022-10-24 | End: 2022-10-26

## 2022-10-24 RX ADMIN — METHYLPREDNISOLONE SODIUM SUCCINATE 40 MG: 40 INJECTION, POWDER, FOR SOLUTION INTRAMUSCULAR; INTRAVENOUS at 05:38

## 2022-10-24 RX ADMIN — METHYLPREDNISOLONE SODIUM SUCCINATE 40 MG: 40 INJECTION, POWDER, FOR SOLUTION INTRAMUSCULAR; INTRAVENOUS at 16:22

## 2022-10-24 RX ADMIN — IPRATROPIUM BROMIDE AND ALBUTEROL SULFATE 3 ML: 2.5; .5 SOLUTION RESPIRATORY (INHALATION) at 10:59

## 2022-10-24 RX ADMIN — ACETAMINOPHEN 650 MG: 325 TABLET, FILM COATED ORAL at 21:24

## 2022-10-24 RX ADMIN — IPRATROPIUM BROMIDE AND ALBUTEROL SULFATE 3 ML: 2.5; .5 SOLUTION RESPIRATORY (INHALATION) at 07:42

## 2022-10-24 RX ADMIN — BUDESONIDE AND FORMOTEROL FUMARATE DIHYDRATE 2 PUFF: 80; 4.5 AEROSOL RESPIRATORY (INHALATION) at 07:43

## 2022-10-24 RX ADMIN — IPRATROPIUM BROMIDE AND ALBUTEROL SULFATE 3 ML: 2.5; .5 SOLUTION RESPIRATORY (INHALATION) at 19:26

## 2022-10-24 RX ADMIN — ARIPIPRAZOLE 30 MG: 10 TABLET ORAL at 05:38

## 2022-10-24 RX ADMIN — IPRATROPIUM BROMIDE AND ALBUTEROL SULFATE 3 ML: 2.5; .5 SOLUTION RESPIRATORY (INHALATION) at 15:39

## 2022-10-24 RX ADMIN — ENOXAPARIN SODIUM 60 MG: 60 INJECTION SUBCUTANEOUS at 05:38

## 2022-10-24 RX ADMIN — ATORVASTATIN CALCIUM 20 MG: 20 TABLET, FILM COATED ORAL at 21:25

## 2022-10-24 RX ADMIN — TRAZODONE HYDROCHLORIDE 50 MG: 50 TABLET ORAL at 21:24

## 2022-10-24 RX ADMIN — BUDESONIDE AND FORMOTEROL FUMARATE DIHYDRATE 2 PUFF: 80; 4.5 AEROSOL RESPIRATORY (INHALATION) at 19:31

## 2022-10-24 RX ADMIN — IPRATROPIUM BROMIDE AND ALBUTEROL SULFATE 3 ML: 2.5; .5 SOLUTION RESPIRATORY (INHALATION) at 03:10

## 2022-10-24 RX ADMIN — MONTELUKAST 10 MG: 10 TABLET, FILM COATED ORAL at 16:22

## 2022-10-24 RX ADMIN — LIDOCAINE HYDROCHLORIDE 30 ML: 20 SOLUTION OROPHARYNGEAL at 20:44

## 2022-10-24 RX ADMIN — IPRATROPIUM BROMIDE AND ALBUTEROL SULFATE 3 ML: 2.5; .5 SOLUTION RESPIRATORY (INHALATION) at 22:37

## 2022-10-24 RX ADMIN — ENOXAPARIN SODIUM 60 MG: 60 INJECTION SUBCUTANEOUS at 16:22

## 2022-10-24 RX ADMIN — FUROSEMIDE 40 MG: 40 TABLET ORAL at 05:38

## 2022-10-24 ASSESSMENT — ENCOUNTER SYMPTOMS
SEIZURES: 0
MUSCULOSKELETAL NEGATIVE: 1
LOSS OF CONSCIOUSNESS: 0
SHORTNESS OF BREATH: 1
BACK PAIN: 0
ABDOMINAL PAIN: 0
SORE THROAT: 0
DIARRHEA: 0
EYES NEGATIVE: 1
NEUROLOGICAL NEGATIVE: 1
SHORTNESS OF BREATH: 0
CHILLS: 0
VOMITING: 0
BLURRED VISION: 0
CARDIOVASCULAR NEGATIVE: 1
GASTROINTESTINAL NEGATIVE: 1
PSYCHIATRIC NEGATIVE: 1
DOUBLE VISION: 0
COUGH: 0
PALPITATIONS: 0
FALLS: 0
NAUSEA: 0
FEVER: 0

## 2022-10-24 ASSESSMENT — COGNITIVE AND FUNCTIONAL STATUS - GENERAL
MOBILITY SCORE: 23
CLIMB 3 TO 5 STEPS WITH RAILING: A LITTLE
SUGGESTED CMS G CODE MODIFIER MOBILITY: CI

## 2022-10-24 ASSESSMENT — PAIN DESCRIPTION - PAIN TYPE
TYPE: ACUTE PAIN
TYPE: CHRONIC PAIN
TYPE: ACUTE PAIN
TYPE: ACUTE PAIN

## 2022-10-24 ASSESSMENT — GAIT ASSESSMENTS
ASSISTIVE DEVICE: 4 WHEEL WALKER
DISTANCE (FEET): 100
GAIT LEVEL OF ASSIST: SUPERVISED

## 2022-10-24 ASSESSMENT — LIFESTYLE VARIABLES: SUBSTANCE_ABUSE: 0

## 2022-10-24 NOTE — THERAPY
Physical Therapy   Initial Evaluation     Patient Name: Lorene Haro  Age:  47 y.o., Sex:  female  Medical Record #: [id number]  Today's Date: 10/24/2022          Assessment  Patient is 47 y.o. female admitted w/ c/o SOB and upper back pain.  Hx of COPD 3 liters, recently d/c'd from hospital one week prior, bipolar, HTN, schizophrenia.  MRI negative for intracranial abnormality.  She lives alone in a second floor apartment w/ elevator access.  She is mobile w/ a 4ww, which is present at bedside.  She reports having someone who comes once a week to take her shopping.  Today, she presents at what appears to be her PLOF.  She is able to move out of bed w/o assist and w/o use of bed features.  She is able to ambulate w/ her own 4ww.  PT will not formally follow but will be available for d/c needs.        DC Equipment Recommendations: None  Discharge Recommendations: Recommend home health for continued physical therapy services          Objective       10/24/22 0814   Prior Living Situation   Housing / Facility 2 Story Apartment / Condo   Steps Into Home 0   Steps In Home 0   Elevator Yes   Equipment Owned 4-Wheel Walker   Lives with - Patient's Self Care Capacity Alone and Able to Care For Self   Prior Level of Functional Mobility   Bed Mobility Independent   Transfer Status Independent   Ambulation Independent   Assistive Devices Used 4-Wheel Walker   Cognition    Level of Consciousness Alert   Strength Lower Body   Comments no gross deficits noted   Balance Assessment   Sitting Balance (Static) Fair +   Sitting Balance (Dynamic) Fair +   Standing Balance (Static) Fair   Standing Balance (Dynamic) Fair   Weight Shift Sitting Good   Weight Shift Standing Good   Comments w/ 4ww   Gait Analysis   Gait Level Of Assist Supervised   Assistive Device 4 Wheel Walker   Distance (Feet) 100   Bed Mobility    Supine to Sit Supervised   Functional Mobility   Sit to Stand Supervised   Bed, Chair, Wheelchair Transfer Supervised    Anticipated Discharge Equipment and Recommendations   DC Equipment Recommendations None   Discharge Recommendations Recommend home health for continued physical therapy services

## 2022-10-24 NOTE — CARE PLAN
Problem: Bronchoconstriction  Goal: Improve in air movement and diminished wheezing  Description: Target End Date:  2 to 3 days    1.  Implement inhaled treatments  2.  Evaluate and manage medication effects  Outcome: Progressing     Problem: Bronchopulmonary Hygiene  Goal: Increase mobilization of retained secretions  Description: Target End Date:  2 to 3 days    1.  Perform bronchopulmonary therapy as indicated by assessment  2.  Perform airway suctioning  3.  Perform actions to maintain patient airway  Outcome: Progressing         Respiratory Update    Treatment modality:  Q4H Duoneb, FV QID, Symbicort.  Pt. Still with wheezing in uppers, placed on bipap overnight and tolerating well      Pt tolerating current treatments well with no adverse reactions.

## 2022-10-24 NOTE — DIETARY
NUTRITION SERVICES: BMI - Pt with BMI >40 (=Body mass index is 51.61 kg/m².), Class III obesity. Weight loss counseling not appropriate in acute care setting. RECOMMEND - If appropriate at DC please refer to outpatient nutrition services for weight management.

## 2022-10-24 NOTE — ASSESSMENT & PLAN NOTE
Diagnosed on recent sleep study  Patient states no home CPAP yet.  Previously on CPAP, but mask broken.  CPAP qhs while in hospital ordered.   -- recs to follow up with pulm

## 2022-10-24 NOTE — PROGRESS NOTES
"Hospital Medicine Daily Progress Note    Date of Service  10/24/2022    Chief Complaint  Lorene Haro is a 47 y.o. female admitted 10/22/2022 with asthma exacerbation and hypoxia.    Hospital Course  Lorene Haro is a 47 y.o. female who presented 10/22/2022 with shortness of breath and upper back pain.     She has a history of COPD on 3 L of oxygen at home. Recently discharged from [facility] ED 1 week ago.  Presented for cough wheezing and low back pain.  She she was treated for COPD exacerbation and discharged with prednisone and duo nebs and a Z-Romario.  She continues to take prednisone.     For the last 3 days she has noted neck pain close to where her \" C2-C4\" area is and that it travels down to her lower back.  She describes it as a muscle pain and not shooting pain.  Denies any trauma and states that this has started suddenly.  She complains of a mild numbness area around her groin but denies urinary and fecal incontinence.  She states that she has been having to urinate more frequently than usual but this has been going on for the last 2 years.  In addition, she reports progressively worsening shortness of breath and cough with white sputum.  She has had the symptoms since she had her recent pneumonia and was getting better but now feels that she is getting worse.  Denies fever and chills.  Denies using alcohol and illicit drugs.  Denies recent tobacco use and states that she has quit smoking.     In ED, patient found to be tachypneic.  Remarkable labs include neutrophilia, troponin 27, , CRP 2.72.  Chest x-ray, x-ray lumbar spine x-ray thoracic spine negative for acute pathology.    Interval Problem Update  Patient was seen and examined at bedside.  No acute events overnight. Patient is resting comfortably in bed and in no acute distress.     Chronic CO2 retainer  Continue CPAP while sleeping  Work to get CPAP before discharge  Check COVID  Pulgabbie hughes    I have discussed this patient's plan of care " and discharge plan at IDT rounds today with Case Management, Nursing, Nursing leadership, and other members of the IDT team.    Consultants/Specialty  pulmonary    Code Status  Full Code    Disposition  Patient is not medically cleared for discharge.   Anticipate discharge to to home with close outpatient follow-up.  I have placed the appropriate orders for post-discharge needs.    Review of Systems  Review of Systems   Constitutional:  Negative for chills and fever.   HENT:  Negative for congestion and sore throat.    Eyes:  Negative for blurred vision and double vision.   Respiratory:  Negative for cough and shortness of breath.    Cardiovascular:  Negative for chest pain and palpitations.   Gastrointestinal:  Negative for abdominal pain, diarrhea, nausea and vomiting.   Genitourinary:  Negative for dysuria and frequency.   Musculoskeletal:  Negative for back pain and falls.   Skin:  Negative for rash.   Neurological:  Negative for seizures and loss of consciousness.   Psychiatric/Behavioral:  Negative for substance abuse.       Physical Exam  Temp:  [36.3 °C (97.3 °F)-36.8 °C (98.2 °F)] 36.4 °C (97.5 °F)  Pulse:  [66-85] 80  Resp:  [14-20] 18  BP: (102-122)/(58-66) 102/66  SpO2:  [90 %-96 %] 93 %    Physical Exam  Vitals and nursing note reviewed.   Constitutional:       General: She is not in acute distress.     Appearance: She is well-developed. She is obese.      Comments: Chronically ill appearing   HENT:      Head: Normocephalic and atraumatic.      Right Ear: External ear normal.      Left Ear: External ear normal.      Nose: Nose normal.      Mouth/Throat:      Pharynx: No oropharyngeal exudate.   Eyes:      General: No scleral icterus.     Conjunctiva/sclera: Conjunctivae normal.      Pupils: Pupils are equal, round, and reactive to light.   Neck:      Thyroid: No thyromegaly.      Vascular: No JVD.      Trachea: No tracheal deviation.   Cardiovascular:      Rate and Rhythm: Normal rate and regular  rhythm.      Heart sounds: Normal heart sounds. No murmur heard.  Pulmonary:      Breath sounds: Rhonchi present. No wheezing.      Comments: Course upper airway sounds  Abdominal:      General: Bowel sounds are normal.      Palpations: Abdomen is soft.      Tenderness: There is no abdominal tenderness. There is no guarding or rebound.      Comments: Protuberant abdomen   Musculoskeletal:         General: No swelling or deformity. Normal range of motion.      Cervical back: Normal range of motion and neck supple.   Lymphadenopathy:      Cervical: No cervical adenopathy.   Skin:     General: Skin is warm and dry.      Findings: No bruising.   Neurological:      General: No focal deficit present.      Mental Status: She is oriented to person, place, and time.      Cranial Nerves: No cranial nerve deficit.      Motor: No abnormal muscle tone.   Psychiatric:         Mood and Affect: Mood normal.         Behavior: Behavior normal.       Fluids    Intake/Output Summary (Last 24 hours) at 10/24/2022 1334  Last data filed at 10/23/2022 2029  Gross per 24 hour   Intake 300 ml   Output --   Net 300 ml       Laboratory  Recent Labs     10/22/22  2329 10/23/22  1733 10/24/22  0325   WBC 10.5 8.2 7.4   RBC 4.52 4.47 4.36   HEMOGLOBIN 11.8* 11.8* 11.3*   HEMATOCRIT 39.1 39.3 38.7   MCV 86.5 87.9 88.8   MCH 26.1* 26.4* 25.9*   MCHC 30.2* 30.0* 29.2*   RDW 52.8* 52.7* 53.3*   PLATELETCT 210 213 198   MPV 11.3 11.1 11.3     Recent Labs     10/22/22  2329 10/23/22  1733 10/24/22  0325   SODIUM 141 141 143   POTASSIUM 4.0 4.6 4.6   CHLORIDE 104 104 104   CO2 27 28 30   GLUCOSE 124* 152* 176*   BUN 12 13 17   CREATININE 0.93 1.04 1.14   CALCIUM 9.3 9.1 9.5                   Imaging  DX-CHEST-PORTABLE (1 VIEW)   Final Result      Cardiac silhouette enlargement. Possible mild vascular congestion.      DX-THORACIC SPINE-2 VIEWS   Final Result      No acute abnormality.      DX-LUMBAR SPINE-2 OR 3 VIEWS   Final Result      No acute  abnormality.           Assessment/Plan  * Obesity hypoventilation syndrome (HCC)- (present on admission)  Assessment & Plan  Patient should qualify for Bipap ordered through the hospital given her BMI, will check ABG for CO2 level.  Pulmonology consult pending.  KATHIA on recent sleep study, but patient states no CPAP at home yet.  Ordered CPAP qhs with RT.  ABG  CXR with cardiomegaly.    Severe asthma with acute exacerbation, unspecified whether persistent- (present on admission)  Assessment & Plan  Multiple admissions for hypoventilation obesity syndrome and recently for pneumonia 10/4-10/7.  Pulm recs      Neck pain  Assessment & Plan  Although patient has elevated CRP, I suspect that patient's symptoms are more likely due to musculoskeletal pain versus an abscess.  Physical therapy Occupational Therapy  Pain medications as needed  10/23: unable to fit in MRI machine on attempt today.  Will need outpatient open MRI.  BCs x2 ordered.  Sed rate  procalcitonin negative    KATHIA (obstructive sleep apnea)- (present on admission)  Assessment & Plan  Diagnosed on recent sleep study  Patient states no home CPAP yet.  Previously on CPAP, but mask broken.  CPAP qhs while in hospital ordered.  Can see if pulmonary or CM can order CPAP this admission.    Acute respiratory failure with hypoxia and hypercapnia (HCC)  Assessment & Plan  Per ABG    Class 3 severe obesity in adult (HCC)  Assessment & Plan  Due to excess caloric intake    Status post placement of implantable loop recorder- (present on admission)  Assessment & Plan  Patient states history of afib, but reviewed all EKGs, no afib seen.  Not clear why on lovenox bid dosing.  No home AC listed.  Will need to verify.      Hyperlipidemia  Assessment & Plan  Continue lipitor     Schizophrenia (HCC)- (present on admission)  Assessment & Plan  Continue home medications       VTE prophylaxis: therapeutic anticoagulation with lovenox bid.    I have performed a physical exam and  reviewed and updated ROS and Plan today (10/24/2022). In review of yesterday's note (10/23/2022), there are no changes except as documented above.

## 2022-10-24 NOTE — ASSESSMENT & PLAN NOTE
Multiple admissions for hypoventilation obesity syndrome and recently for pneumonia 10/4-10/7.  Pulm recs

## 2022-10-24 NOTE — PROGRESS NOTES
Tech from Lab called with critical result of PCO2 at 51.3. Critical lab result read back to Ean GREENE.   Dr. Sen notified of critical lab result at 1856.  Critical lab result read back by Dr. Sen.

## 2022-10-24 NOTE — CARE PLAN
The patient is Watcher - Medium risk of patient condition declining or worsening    Shift Goals  Clinical Goals: Maintain adequate oxygenation status/RT treatments  Patient Goals: Rest  Family Goals: ARAM      Problem: Knowledge Deficit - COPD  Goal: Patient/significant other demonstrates understanding of disease process, utilization of the Action Plan, medications and discharge instruction  Outcome: Progressing     Problem: Impaired Gas Exchange  Goal: Patient will demonstrate improved ventilation and adequate oxygenation and participate in treatment regimen within the level of ability/situation.  Outcome: Progressing

## 2022-10-24 NOTE — PROGRESS NOTES
Called about ABG result by APRN and per pulmonary medicine note patient needs to come to ICU if retaining CO2.     Went to patient bedside patient awakes able to answer all question and is alert and oriented. She is very obese BMI 48 in bed and not on nocturnal Cpap which she has been diagnosed with KATHIA.     ABG ordered by pulmonary: 7.34/51/81 serum chemistry bicarb of 28 lives 27-30.    Prior echo reviewed with unable to see RV/TV on 10/2022 but 2/27/2022 with a RVSP of 35. Reviewed pulmonary medicine note.    A/P: With her mental status completely awake with chronic hypercarbic respiratory failure on ABG interpretation. Recommend nocturnal Cpap, avoid central acting sedatives, limit oxygen with goal O2 sat 88% please wean her currently oxygen. Mobilize as much as possible out of bed into a chair all day, IS every hour. When in Bed please place cpap on patient. I called and spoke with nocturnal APRN and called RT to get the patient a Cpap unit to bedside.     Call with changes in patient condition or somnolence needing more aggressive interventions.     David Abdi MD  Critical Care Medicine

## 2022-10-25 LAB
ALBUMIN SERPL BCP-MCNC: 3.6 G/DL (ref 3.2–4.9)
ANION GAP SERPL CALC-SCNC: 9 MMOL/L (ref 7–16)
BASOPHILS # BLD AUTO: 0.2 % (ref 0–1.8)
BASOPHILS # BLD: 0.01 K/UL (ref 0–0.12)
BUN SERPL-MCNC: 23 MG/DL (ref 8–22)
CALCIUM SERPL-MCNC: 9.7 MG/DL (ref 8.5–10.5)
CHLORIDE SERPL-SCNC: 103 MMOL/L (ref 96–112)
CO2 SERPL-SCNC: 31 MMOL/L (ref 20–33)
CREAT SERPL-MCNC: 0.96 MG/DL (ref 0.5–1.4)
EKG IMPRESSION: NORMAL
EOSINOPHIL # BLD AUTO: 0 K/UL (ref 0–0.51)
EOSINOPHIL NFR BLD: 0 % (ref 0–6.9)
ERYTHROCYTE [DISTWIDTH] IN BLOOD BY AUTOMATED COUNT: 52.9 FL (ref 35.9–50)
GFR SERPLBLD CREATININE-BSD FMLA CKD-EPI: 72 ML/MIN/1.73 M 2
GLUCOSE SERPL-MCNC: 174 MG/DL (ref 65–99)
HCT VFR BLD AUTO: 36.3 % (ref 37–47)
HGB BLD-MCNC: 10.9 G/DL (ref 12–16)
IMM GRANULOCYTES # BLD AUTO: 0.03 K/UL (ref 0–0.11)
IMM GRANULOCYTES NFR BLD AUTO: 0.5 % (ref 0–0.9)
LYMPHOCYTES # BLD AUTO: 0.87 K/UL (ref 1–4.8)
LYMPHOCYTES NFR BLD: 13.3 % (ref 22–41)
MAGNESIUM SERPL-MCNC: 2.5 MG/DL (ref 1.5–2.5)
MCH RBC QN AUTO: 26.3 PG (ref 27–33)
MCHC RBC AUTO-ENTMCNC: 30 G/DL (ref 33.6–35)
MCV RBC AUTO: 87.7 FL (ref 81.4–97.8)
MONOCYTES # BLD AUTO: 0.3 K/UL (ref 0–0.85)
MONOCYTES NFR BLD AUTO: 4.6 % (ref 0–13.4)
NEUTROPHILS # BLD AUTO: 5.33 K/UL (ref 2–7.15)
NEUTROPHILS NFR BLD: 81.4 % (ref 44–72)
NRBC # BLD AUTO: 0 K/UL
NRBC BLD-RTO: 0 /100 WBC
PHOSPHATE SERPL-MCNC: 2.3 MG/DL (ref 2.5–4.5)
PLATELET # BLD AUTO: 172 K/UL (ref 164–446)
PMV BLD AUTO: 12.2 FL (ref 9–12.9)
POTASSIUM SERPL-SCNC: 4.6 MMOL/L (ref 3.6–5.5)
RBC # BLD AUTO: 4.14 M/UL (ref 4.2–5.4)
SODIUM SERPL-SCNC: 143 MMOL/L (ref 135–145)
TROPONIN T SERPL-MCNC: 18 NG/L (ref 6–19)
WBC # BLD AUTO: 6.5 K/UL (ref 4.8–10.8)

## 2022-10-25 PROCEDURE — 94660 CPAP INITIATION&MGMT: CPT

## 2022-10-25 PROCEDURE — 94640 AIRWAY INHALATION TREATMENT: CPT

## 2022-10-25 PROCEDURE — 97166 OT EVAL MOD COMPLEX 45 MIN: CPT

## 2022-10-25 PROCEDURE — 770001 HCHG ROOM/CARE - MED/SURG/GYN PRIV*

## 2022-10-25 PROCEDURE — 94760 N-INVAS EAR/PLS OXIMETRY 1: CPT

## 2022-10-25 PROCEDURE — 700101 HCHG RX REV CODE 250: Performed by: HOSPITALIST

## 2022-10-25 PROCEDURE — 700102 HCHG RX REV CODE 250 W/ 637 OVERRIDE(OP): Performed by: HOSPITALIST

## 2022-10-25 PROCEDURE — 93010 ELECTROCARDIOGRAM REPORT: CPT | Performed by: INTERNAL MEDICINE

## 2022-10-25 PROCEDURE — 84484 ASSAY OF TROPONIN QUANT: CPT

## 2022-10-25 PROCEDURE — 83735 ASSAY OF MAGNESIUM: CPT

## 2022-10-25 PROCEDURE — 80069 RENAL FUNCTION PANEL: CPT

## 2022-10-25 PROCEDURE — 99232 SBSQ HOSP IP/OBS MODERATE 35: CPT | Performed by: HOSPITALIST

## 2022-10-25 PROCEDURE — 700102 HCHG RX REV CODE 250 W/ 637 OVERRIDE(OP): Performed by: STUDENT IN AN ORGANIZED HEALTH CARE EDUCATION/TRAINING PROGRAM

## 2022-10-25 PROCEDURE — A9270 NON-COVERED ITEM OR SERVICE: HCPCS | Performed by: HOSPITALIST

## 2022-10-25 PROCEDURE — 700111 HCHG RX REV CODE 636 W/ 250 OVERRIDE (IP): Performed by: HOSPITALIST

## 2022-10-25 PROCEDURE — A9270 NON-COVERED ITEM OR SERVICE: HCPCS | Performed by: STUDENT IN AN ORGANIZED HEALTH CARE EDUCATION/TRAINING PROGRAM

## 2022-10-25 PROCEDURE — 94669 MECHANICAL CHEST WALL OSCILL: CPT

## 2022-10-25 PROCEDURE — 85025 COMPLETE CBC W/AUTO DIFF WBC: CPT

## 2022-10-25 PROCEDURE — 700111 HCHG RX REV CODE 636 W/ 250 OVERRIDE (IP): Performed by: STUDENT IN AN ORGANIZED HEALTH CARE EDUCATION/TRAINING PROGRAM

## 2022-10-25 RX ORDER — TRAMADOL HYDROCHLORIDE 50 MG/1
50 TABLET ORAL EVERY 6 HOURS PRN
Status: DISCONTINUED | OUTPATIENT
Start: 2022-10-25 | End: 2022-10-26 | Stop reason: HOSPADM

## 2022-10-25 RX ORDER — POLYETHYLENE GLYCOL 3350 17 G/17G
1 POWDER, FOR SOLUTION ORAL DAILY
Status: DISCONTINUED | OUTPATIENT
Start: 2022-10-25 | End: 2022-10-26 | Stop reason: HOSPADM

## 2022-10-25 RX ORDER — METHYLPREDNISOLONE SODIUM SUCCINATE 40 MG/ML
40 INJECTION, POWDER, LYOPHILIZED, FOR SOLUTION INTRAMUSCULAR; INTRAVENOUS EVERY 6 HOURS
Status: DISCONTINUED | OUTPATIENT
Start: 2022-10-25 | End: 2022-10-26 | Stop reason: HOSPADM

## 2022-10-25 RX ORDER — BISACODYL 10 MG
10 SUPPOSITORY, RECTAL RECTAL DAILY
Status: DISCONTINUED | OUTPATIENT
Start: 2022-10-25 | End: 2022-10-26 | Stop reason: HOSPADM

## 2022-10-25 RX ORDER — SENNOSIDES A AND B 8.6 MG/1
8.6 TABLET, FILM COATED ORAL
Status: DISCONTINUED | OUTPATIENT
Start: 2022-10-25 | End: 2022-10-26 | Stop reason: HOSPADM

## 2022-10-25 RX ADMIN — ARIPIPRAZOLE 30 MG: 10 TABLET ORAL at 04:41

## 2022-10-25 RX ADMIN — IPRATROPIUM BROMIDE AND ALBUTEROL SULFATE 3 ML: 2.5; .5 SOLUTION RESPIRATORY (INHALATION) at 23:03

## 2022-10-25 RX ADMIN — FUROSEMIDE 40 MG: 40 TABLET ORAL at 04:42

## 2022-10-25 RX ADMIN — POLYETHYLENE GLYCOL 3350 1 PACKET: 17 POWDER, FOR SOLUTION ORAL at 13:40

## 2022-10-25 RX ADMIN — METHYLPREDNISOLONE SODIUM SUCCINATE 40 MG: 40 INJECTION, POWDER, FOR SOLUTION INTRAMUSCULAR; INTRAVENOUS at 04:42

## 2022-10-25 RX ADMIN — ACETAMINOPHEN 650 MG: 325 TABLET, FILM COATED ORAL at 17:22

## 2022-10-25 RX ADMIN — ENOXAPARIN SODIUM 60 MG: 60 INJECTION SUBCUTANEOUS at 04:42

## 2022-10-25 RX ADMIN — FAMOTIDINE 20 MG: 10 INJECTION, SOLUTION INTRAVENOUS at 17:22

## 2022-10-25 RX ADMIN — TRAMADOL HYDROCHLORIDE 50 MG: 50 TABLET, COATED ORAL at 19:52

## 2022-10-25 RX ADMIN — ACETAMINOPHEN 650 MG: 325 TABLET, FILM COATED ORAL at 04:42

## 2022-10-25 RX ADMIN — IPRATROPIUM BROMIDE AND ALBUTEROL SULFATE 3 ML: 2.5; .5 SOLUTION RESPIRATORY (INHALATION) at 07:13

## 2022-10-25 RX ADMIN — MAGNESIUM HYDROXIDE 30 ML: 400 SUSPENSION ORAL at 19:52

## 2022-10-25 RX ADMIN — BUDESONIDE AND FORMOTEROL FUMARATE DIHYDRATE 2 PUFF: 80; 4.5 AEROSOL RESPIRATORY (INHALATION) at 07:14

## 2022-10-25 RX ADMIN — TRAMADOL HYDROCHLORIDE 50 MG: 50 TABLET, COATED ORAL at 13:40

## 2022-10-25 RX ADMIN — ATORVASTATIN CALCIUM 20 MG: 20 TABLET, FILM COATED ORAL at 19:51

## 2022-10-25 RX ADMIN — LIDOCAINE HYDROCHLORIDE 30 ML: 20 SOLUTION OROPHARYNGEAL at 13:47

## 2022-10-25 RX ADMIN — ENOXAPARIN SODIUM 60 MG: 60 INJECTION SUBCUTANEOUS at 17:21

## 2022-10-25 RX ADMIN — IPRATROPIUM BROMIDE AND ALBUTEROL SULFATE 3 ML: 2.5; .5 SOLUTION RESPIRATORY (INHALATION) at 14:40

## 2022-10-25 RX ADMIN — IPRATROPIUM BROMIDE AND ALBUTEROL SULFATE 3 ML: 2.5; .5 SOLUTION RESPIRATORY (INHALATION) at 02:40

## 2022-10-25 RX ADMIN — METHYLPREDNISOLONE SODIUM SUCCINATE 40 MG: 40 INJECTION, POWDER, FOR SOLUTION INTRAMUSCULAR; INTRAVENOUS at 13:40

## 2022-10-25 RX ADMIN — METHYLPREDNISOLONE SODIUM SUCCINATE 40 MG: 40 INJECTION, POWDER, FOR SOLUTION INTRAMUSCULAR; INTRAVENOUS at 19:51

## 2022-10-25 RX ADMIN — METHYLPREDNISOLONE SODIUM SUCCINATE 40 MG: 40 INJECTION, POWDER, FOR SOLUTION INTRAMUSCULAR; INTRAVENOUS at 23:18

## 2022-10-25 RX ADMIN — MONTELUKAST 10 MG: 10 TABLET, FILM COATED ORAL at 17:22

## 2022-10-25 RX ADMIN — IPRATROPIUM BROMIDE AND ALBUTEROL SULFATE 3 ML: 2.5; .5 SOLUTION RESPIRATORY (INHALATION) at 18:48

## 2022-10-25 RX ADMIN — IPRATROPIUM BROMIDE AND ALBUTEROL SULFATE 3 ML: 2.5; .5 SOLUTION RESPIRATORY (INHALATION) at 10:56

## 2022-10-25 RX ADMIN — TRAZODONE HYDROCHLORIDE 50 MG: 50 TABLET ORAL at 19:52

## 2022-10-25 RX ADMIN — FAMOTIDINE 20 MG: 10 INJECTION, SOLUTION INTRAVENOUS at 13:40

## 2022-10-25 ASSESSMENT — ENCOUNTER SYMPTOMS
SHORTNESS OF BREATH: 0
DIARRHEA: 0
FEVER: 0
SEIZURES: 0
FALLS: 0
BLURRED VISION: 0
DOUBLE VISION: 0
COUGH: 0
LOSS OF CONSCIOUSNESS: 0
CHILLS: 0
SORE THROAT: 0
PALPITATIONS: 0
NAUSEA: 0
ABDOMINAL PAIN: 0
BACK PAIN: 0
VOMITING: 0

## 2022-10-25 ASSESSMENT — PAIN DESCRIPTION - PAIN TYPE
TYPE: ACUTE PAIN

## 2022-10-25 ASSESSMENT — COGNITIVE AND FUNCTIONAL STATUS - GENERAL
DAILY ACTIVITIY SCORE: 24
SUGGESTED CMS G CODE MODIFIER DAILY ACTIVITY: CH

## 2022-10-25 ASSESSMENT — ACTIVITIES OF DAILY LIVING (ADL): TOILETING: INDEPENDENT

## 2022-10-25 ASSESSMENT — LIFESTYLE VARIABLES: SUBSTANCE_ABUSE: 0

## 2022-10-25 NOTE — CARE PLAN
The patient is Watcher - Medium risk of patient condition declining or worsening    Shift Goals  Clinical Goals: respiratory status, wean off O2  Patient Goals: rest  Family Goals: ARAM    Progress made toward(s) clinical / shift goals:      Problem: Knowledge Deficit - Standard  Goal: Patient and family/care givers will demonstrate understanding of plan of care, disease process/condition, diagnostic tests and medications  Outcome: Progressing     Problem: Knowledge Deficit - COPD  Goal: Patient/significant other demonstrates understanding of disease process, utilization of the Action Plan, medications and discharge instruction  Outcome: Progressing     Problem: Pain - Standard  Goal: Alleviation of pain or a reduction in pain to the patient’s comfort goal  Outcome: Progressing       Patient is not progressing towards the following goals:

## 2022-10-25 NOTE — THERAPY
Occupational Therapy   Initial Evaluation     Patient Name: Lorene Haro  Age:  47 y.o., Sex:  female  Medical Record #: [id number]  Today's Date: 10/25/2022        Assessment  Patient is 47 y.o. female admitted with COPD exacerbation and hypoxia seen for OT evaluation. Pt able to demonstrate donning person pants seated in a low chair, toileting, toilet txf without use of arm rest and functional mobility supervision level. Pt reported she has assist with grocery shopping but is otherwise independent. She was also able to maintain O2 saturation above 90% on 3L throughout activity. Anticipate DC home. Patient will not be actively followed for occupational therapy services at this time, however may be seen if requested by physician for 1 more visit within 30 days to address any discharge or equipment needs.       Plan    Recommend Occupational Therapy  DC needs    DC Equipment Recommendations: (P) None  Discharge Recommendations: (P) Anticipate that the patient will have no further occupational therapy needs after discharge from the hospital      10/25/22 8100   Prior Living Situation   Housing / Facility 2 Story Apartment / Condo   Bathroom Set up Bathtub / Shower Combination;Tub Transfer Bench   Equipment Owned 4-Wheel Walker;Tub Transfer Bench   Lives with - Patient's Self Care Capacity Alone and Able to Care For Self   Comments has assist with groceries   Prior Level of ADL Function   Self Feeding Independent   Grooming / Hygiene Independent   Bathing Independent   Dressing Independent   Toileting Independent   Prior Level of IADL Function   Medication Management Independent   Laundry Independent   Kitchen Mobility Independent   Finances Independent   Home Management Independent   Shopping Independent   Cognition    Cognition / Consciousness WDL   Active ROM Upper Body   Active ROM Upper Body  WDL   Strength Upper Body   Upper Body Strength  WDL   Balance Assessment   Sitting Balance (Static) Good   Sitting  Balance (Dynamic) Good   Standing Balance (Static) Fair +   Standing Balance (Dynamic) Fair +   Weight Shift Sitting Good   Weight Shift Standing Good   Bed Mobility    Supine to Sit Supervised   ADL Assessment   Grooming Supervision;Standing   Upper Body Dressing Supervision   Lower Body Dressing Supervision  (donning personal pants)   Toileting Supervision   How much help from another person does the patient currently need...   6 Clicks Daily Activity Score 24   Functional Mobility   Sit to Stand Supervised   Bed, Chair, Wheelchair Transfer Supervised   Toilet Transfers Supervised  (without use of grab bars)   Education Group   Education Provided Energy Conservation   Role of Occupational Therapist Patient Response Patient;Acceptance;Explanation   Energy Conservation Patient Response Patient;Acceptance;Explanation   Anticipated Discharge Equipment and Recommendations   DC Equipment Recommendations None   Discharge Recommendations Anticipate that the patient will have no further occupational therapy needs after discharge from the hospital

## 2022-10-25 NOTE — DISCHARGE PLANNING
Case Management Discharge Planning    Admission Date: 10/22/2022  GMLOS: 2.8  ALOS: 2    6-Clicks ADL Score: 16  6-Clicks Mobility Score: 23  PT and/or OT Eval ordered: Yes  Post-acute Referrals Ordered: No  Post-acute Choice Obtained: No  Has referral(s) been sent to post-acute provider:  No      Anticipated Discharge Dispo:      DME Needed: No    Action(s) Taken: OTHER    SW met with patient to complete assessment. Patient reports that she does not have supports and struggles with completing ADLs/IADLs. SW informed patient that SW would meet with her after rounds to discuss the recommendations.     Escalations Completed: None    Medically Clear: No    Next Steps:     Barriers to Discharge: No Social Support  Patient does not have regular community/social support to assist patient in the home. Patient resides alone. She stated that she has someone that comes on Thursdays to help her go shopping however no other assistance.     The recommendation is for home with HH however patient has medicaid and SW was informed that HH agencies in the area are not accepting medicaid participants at this time because they do not have any slots available for their insurance.     Is the patient up for discharge tomorrow: No

## 2022-10-25 NOTE — PROGRESS NOTES
"Pulmonary Progress Note    Date of admission  10/22/2022    Chief Complaint  47 y.o. female admitted 10/22/2022 with asthma and OHS and likely KATHIA    Hospital Course  \"47 y.o. female who presented 10/22/2022 with neck pain.  Patient has a history of asthma and has been compliant with her Advair.  She also has a history of KATHIA on home CPAP.  She has a 30 pack year smoking history and is currently smoking.    She was admitted 10/1-10/2 with a respiratory failure, thought to be 2/2 asthma exacerbation and HF. She was seen again 10/4-10/7 with continued worsening of respiratory failure, treated for pna.   Seen again in the ER 10/12 for respiratory failure, Dced with prednisone and azithro  Seen again in the ER 10/14 for wheezing and respiratory failure and Dced with the same plan.      Patient had pulmonary function test done which showed no obstruction with significant bronchodilators response.  She had a mildly reduced TLC, consistent with mild restriction.  Preserved DLCO.  CT shows no signs of emphysema\" From DR. Torres note    Interval Problem Update  Reviewed last 24 hour events:  ABG yesterday 7.34/51/81/27  Compensated respiratory acidosis    Review of Systems  Review of Systems   Constitutional:  Positive for malaise/fatigue.   HENT: Negative.     Eyes: Negative.    Respiratory:  Positive for shortness of breath.    Cardiovascular: Negative.    Gastrointestinal: Negative.    Genitourinary: Negative.    Musculoskeletal: Negative.    Skin: Negative.    Neurological: Negative.    Endo/Heme/Allergies: Negative.    Psychiatric/Behavioral: Negative.        Vital Signs for last 24 hours   Temp:  [36.4 °C (97.5 °F)-36.8 °C (98.2 °F)] 36.7 °C (98 °F)  Pulse:  [66-85] 79  Resp:  [14-20] 18  BP: (102-122)/(61-68) 105/68  SpO2:  [90 %-96 %] 95 %          Physical Exam   Physical Exam  Constitutional:       Appearance: She is obese. She is ill-appearing.   HENT:      Head: Normocephalic and atraumatic.      Comments: " hirsutism     Mouth/Throat:      Mouth: Mucous membranes are dry.   Eyes:      Extraocular Movements: Extraocular movements intact.      Pupils: Pupils are equal, round, and reactive to light.   Cardiovascular:      Rate and Rhythm: Tachycardia present.   Pulmonary:      Effort: Pulmonary effort is normal.      Comments: Diminished BS b/l but =  No wheeze  Skin:     General: Skin is warm and dry.   Neurological:      General: No focal deficit present.      Mental Status: She is alert and oriented to person, place, and time.   Psychiatric:         Mood and Affect: Mood normal.         Thought Content: Thought content normal.       Medications  Current Facility-Administered Medications   Medication Dose Route Frequency Provider Last Rate Last Admin    ipratropium-albuterol (DUONEB) nebulizer solution  3 mL Nebulization Q4HRS (RT) Sharri Loera M.D.   3 mL at 10/24/22 1539    ARIPiprazole (Abilify) tablet 30 mg  30 mg Oral QAM Nader Quiroga M.D.   30 mg at 10/24/22 0538    atorvastatin (LIPITOR) tablet 20 mg  20 mg Oral QHS Nader Quiroga M.D.   20 mg at 10/23/22 2025    furosemide (LASIX) tablet 40 mg  40 mg Oral DAILY Nader Quiroga M.D.   40 mg at 10/24/22 0538    montelukast (SINGULAIR) tablet 10 mg  10 mg Oral Q EVENING Nader Quiroga M.D.   10 mg at 10/24/22 1622    traZODone (DESYREL) tablet 50 mg  50 mg Oral QHS Nader Quiroga M.D.        Respiratory Therapy Consult   Nebulization Continuous RT Nader Quiroga M.D.        methylPREDNISolone (SOLU-MEDROL) 40 MG injection 40 mg  40 mg Intravenous Q12HRS Nader Quiroga M.D.   40 mg at 10/24/22 1622    acetaminophen (Tylenol) tablet 650 mg  650 mg Oral Q6HRS PRN Nader Quiroga M.D.        enoxaparin (Lovenox) inj 60 mg  60 mg Subcutaneous Q12HRS Nader Quiroga M.D.   60 mg at 10/24/22 1622    budesonide-formoterol (SYMBICORT) 80-4.5 MCG/ACT inhaler 2 Puff  2 Puff Inhalation BID (RT) Nader Quiroga M.D.   2 Puff at 10/24/22  0743       Fluids    Intake/Output Summary (Last 24 hours) at 10/24/2022 1919  Last data filed at 10/23/2022 2029  Gross per 24 hour   Intake 300 ml   Output --   Net 300 ml       Laboratory  Recent Labs     10/23/22  1733   [id number] 7.34*   [id number] 51.3*   [id number] 81.6   [id number] 94.9   ARTHCO3 27*   ARTBE 0         Recent Labs     10/22/22  2329 10/23/22  1733 10/24/22  0325   SODIUM 141 141 143   POTASSIUM 4.0 4.6 4.6   CHLORIDE 104 104 104   CO2 27 28 30   BUN 12 13 17   CREATININE 0.93 1.04 1.14   CALCIUM 9.3 9.1 9.5     Recent Labs     10/22/22  2329 10/23/22  1733 10/24/22  0325   GLUCOSE 124* 152* 176*     Recent Labs     10/22/22  2329 10/23/22  1733 10/24/22  0325   WBC 10.5 8.2 7.4   NEUTSPOLYS 73.90* 91.70* 89.40*   LYMPHOCYTES 19.90* 6.30* 8.70*   MONOCYTES 4.80 1.30 1.40   EOSINOPHILS 0.90 0.00 0.00   BASOPHILS 0.10 0.10 0.00     Recent Labs     10/22/22  2329 10/23/22  1733 10/24/22  0325   RBC 4.52 4.47 4.36   HEMOGLOBIN 11.8* 11.8* 11.3*   HEMATOCRIT 39.1 39.3 38.7   PLATELETCT 210 213 198       Imaging  X-Ray:  I have personally reviewed the images and compared with prior images.  CXR with enlarged heart with vascular congestion mild    Assessment/Plan  Severe asthma with acute exacerbation, unspecified whether persistent- (present on admission)  Assessment & Plan  Agree with Dr. Kerr that patient does not have a diagnosis on COPD, she has no obstruction on PFTs done 1 year ago  Pt alert today with no signs of paco2 retention  -continue with symbicort  -continue with solumedrol 40mg IV BID   -duo nebs  -titrate O2 88-92%     Pt with known OHS with hypoxemia  KATHIA dx in 2018 /hr and was previously on  AVAPs titration with final pressure EPAP: 11 cm of water, IPAP: 24/15 cm of water, volume: 450 mL, rate of 14, and 3 L oxygen bleed in  She is an exsmoker but she does not have evidence of COPD    She has KATHIA, OHS, restrictive lung disease due to obesity and asthma    She has improved  with rx for asthma  Follow up in pulm clinic post discharge  Pulm will sign off       I have performed a physical exam and reviewed and updated ROS and Plan today (10/24/2022). In review of yesterday's note (10/23/2022), there are no changes except as documented above.

## 2022-10-25 NOTE — PROGRESS NOTES
"[facility] Coverage Progress Note:     Lorene Haro is a 47-year-old female with a past medical history of COPD, asthma, hypoventilation obesity syndrome, hyperlipidemia, KATHIA and reported history of A. fib with implantable loop recorder not on outpatient anticoagulation who was admitted on 10/22/2022 with acute hypoxemic respiratory failure secondary to COPD exacerbation.    Rapid response called this evening at 2010 for new onset chest pain.  Patient reports sharp, intermittent sternal chest pain with associated dizziness.  She denies that the pain radiates.  She denies headache, palpitations, nausea/vomiting, abdominal pain. She does endorse ongoing shortness of breath, however denies that it is worse this evening. She does report that the pain is worse with deep breath and cough.     Stat EKG obtained revealing sinus rhythm, rate 84 BPM, with borderline ST elevation in V2,V3, and V6. Troponin obtained at 19. Prior EKGs reviewed -- no evidence of atrial fibrillation. Chest xray was obtained showing no acute cardiopulmonary process. BP obtained in both arms and equal.     BP (!) 97/64   Pulse 83   Temp 36.7 °C (98.1 °F)   Resp 18   Ht 1.549 m (5' 1\")   Wt 124 kg (273 lb 2.4 oz)   SpO2 94%     Physical Exam  Vitals reviewed.   Constitutional:       General: She is not in acute distress.     Appearance: She is obese. She is not ill-appearing, toxic-appearing or diaphoretic.   HENT:      Mouth/Throat:      Mouth: Mucous membranes are moist.   Cardiovascular:      Rate and Rhythm: Normal rate and regular rhythm.      Pulses: Normal pulses.      Heart sounds: Normal heart sounds.   Pulmonary:      Effort: Pulmonary effort is normal. No respiratory distress.      Breath sounds: Wheezing present. No rhonchi or rales.      Comments: Mild wheezing  Chest:      Chest wall: No tenderness.   Abdominal:      General: There is no distension.      Palpations: Abdomen is soft.      Tenderness: There is no abdominal " tenderness.   Musculoskeletal:      Right lower leg: Edema present.      Left lower leg: Edema present.   Skin:     Capillary Refill: Capillary refill takes less than 2 seconds.   Neurological:      General: No focal deficit present.      Mental Status: She is alert and oriented to person, place, and time. Mental status is at baseline.   Psychiatric:      Comments: anxious     Plan:   Chest pain  Differentials include but not limited to ACS, GERD, Pulmonary Embolism, aortic aneurysm   Aortic aneursym considered however less likely as blood pressures symmetric, no weakness/numbness, no focal neuro s/sx  No STEMI on EKG, troponin negative -- will repeat troponin in 4 hours   -GI cocktail administered with some improvement in pain  -D-dimer ordered given reported hx of atrial fibrillation and not on AC, pending     HENRIETTA Stoner Hospitalist

## 2022-10-25 NOTE — PROGRESS NOTES
Rapid response called due to patient c/o new onset chest pain 9.5/10, and dizziness. Denies nausea, pain not radiating. Pt admitted for exacerbation of COPD, pt typically c/o SOB, not a new complaint for her.

## 2022-10-25 NOTE — PROGRESS NOTES
Hospital Medicine Daily Progress Note    Date of Service  10/25/2022    Chief Complaint  Lorene Haro is a 47 y.o. female admitted 10/22/2022 with asthma exacerbation and hypoxia.    Hospital Course    Interval Problem Update  No acute events overnight, medicine has been stable, heart rate 66-82, blood pressure 1 21-72, saturating 90% range of oxygen.  Patient is alert and oriented, sitting in a chair, answering questions appropriately.  On examination, he still have rales and wheezing.    He was started on Solu-Medrol 40 mg every 6 hours.  We will provide Pepcid.    Patient has recreational considering chest pain, D-dimer is normal EKG did not show any acute ST and T wave changes.  Likely secondary to acid reflux.    Phosphorus is noted to be elevated 2.3, will replete as needed    I have discussed this patient's plan of care and discharge plan at IDT rounds today with Case Management, Nursing, Nursing leadership, and other members of the IDT team.    Consultants/Specialty  pulmonary    Code Status  Full Code    Disposition  Patient is not medically cleared for discharge.   Anticipate discharge to to home with close outpatient follow-up.  I have placed the appropriate orders for post-discharge needs.    Review of Systems  Review of Systems   Constitutional:  Negative for chills and fever.   HENT:  Negative for congestion and sore throat.    Eyes:  Negative for blurred vision and double vision.   Respiratory:  Negative for cough and shortness of breath.    Cardiovascular:  Negative for chest pain and palpitations.   Gastrointestinal:  Negative for abdominal pain, diarrhea, nausea and vomiting.   Genitourinary:  Negative for dysuria and frequency.   Musculoskeletal:  Negative for back pain and falls.   Skin:  Negative for rash.   Neurological:  Negative for seizures and loss of consciousness.   Psychiatric/Behavioral:  Negative for substance abuse.       Physical Exam  Temp:  [36.2 °C (97.1 °F)-36.8 °C (98.3  °F)] 36.6 °C (97.8 °F)  Pulse:  [66-93] 79  Resp:  [16-20] 18  BP: ()/(61-76) 121/72  SpO2:  [92 %-98 %] 94 %    Physical Exam  Vitals and nursing note reviewed.   Constitutional:       General: She is not in acute distress.     Appearance: She is well-developed. She is obese.      Comments: Chronically ill appearing   HENT:      Head: Normocephalic and atraumatic.      Right Ear: External ear normal.      Left Ear: External ear normal.      Nose: Nose normal.      Mouth/Throat:      Pharynx: No oropharyngeal exudate.   Eyes:      General: No scleral icterus.     Conjunctiva/sclera: Conjunctivae normal.      Pupils: Pupils are equal, round, and reactive to light.   Neck:      Thyroid: No thyromegaly.      Vascular: No JVD.      Trachea: No tracheal deviation.   Cardiovascular:      Rate and Rhythm: Normal rate and regular rhythm.      Heart sounds: Normal heart sounds. No murmur heard.  Pulmonary:      Breath sounds: Rhonchi present. No wheezing.      Comments: Course upper airway sounds  Abdominal:      General: Bowel sounds are normal.      Palpations: Abdomen is soft.      Tenderness: There is no abdominal tenderness. There is no guarding or rebound.      Comments: Protuberant abdomen   Musculoskeletal:         General: No swelling or deformity. Normal range of motion.      Cervical back: Normal range of motion and neck supple.   Lymphadenopathy:      Cervical: No cervical adenopathy.   Skin:     General: Skin is warm and dry.      Findings: No bruising.   Neurological:      General: No focal deficit present.      Mental Status: She is oriented to person, place, and time.      Cranial Nerves: No cranial nerve deficit.      Motor: No abnormal muscle tone.   Psychiatric:         Mood and Affect: Mood normal.         Behavior: Behavior normal.       Fluids    Intake/Output Summary (Last 24 hours) at 10/25/2022 1608  Last data filed at 10/25/2022 1000  Gross per 24 hour   Intake 240 ml   Output 1100 ml   Net  -860 ml         Laboratory  Recent Labs     10/23/22  1733 10/24/22  0325 10/25/22  0302   WBC 8.2 7.4 6.5   RBC 4.47 4.36 4.14*   HEMOGLOBIN 11.8* 11.3* 10.9*   HEMATOCRIT 39.3 38.7 36.3*   MCV 87.9 88.8 87.7   MCH 26.4* 25.9* 26.3*   MCHC 30.0* 29.2* 30.0*   RDW 52.7* 53.3* 52.9*   PLATELETCT 213 198 172   MPV 11.1 11.3 12.2       Recent Labs     10/23/22  1733 10/24/22  0325 10/25/22  0302   SODIUM 141 143 143   POTASSIUM 4.6 4.6 4.6   CHLORIDE 104 104 103   CO2 28 30 31   GLUCOSE 152* 176* 174*   BUN 13 17 23*   CREATININE 1.04 1.14 0.96   CALCIUM 9.1 9.5 9.7                     Imaging  DX-CHEST-LIMITED (1 VIEW)   Final Result      No acute cardiopulmonary abnormality.      DX-CHEST-PORTABLE (1 VIEW)   Final Result      Cardiac silhouette enlargement. Possible mild vascular congestion.      DX-THORACIC SPINE-2 VIEWS   Final Result      No acute abnormality.      DX-LUMBAR SPINE-2 OR 3 VIEWS   Final Result      No acute abnormality.             Assessment/Plan  * Obesity hypoventilation syndrome (HCC)- (present on admission)  Assessment & Plan  Pulm following       Class 3 severe obesity in adult (HCC)  Assessment & Plan  Due to excess caloric intake    Acute respiratory failure with hypoxia and hypercapnia (HCC)  Assessment & Plan  Oxygenation has been improving, currently requiring 3 L of oxygen    Status post placement of implantable loop recorder- (present on admission)  Assessment & Plan  Patient states history of afib, but reviewed all EKGs, no afib seen.      Severe asthma with acute exacerbation, unspecified whether persistent- (present on admission)  Assessment & Plan  Multiple admissions for hypoventilation obesity syndrome and recently for pneumonia 10/4-10/7.  Pulm recs      Neck pain  Assessment & Plan  Although patient has elevated CRP, I suspect that patient's symptoms are more likely due to musculoskeletal pain versus an abscess.  Physical therapy Occupational Therapy  Pain medications as  needed  10/23: unable to fit in MRI machine on attempt today.  Will need outpatient open MRI.  BCs x2 ordered.  Sed rate  procalcitonin negative  No need for abx    Hyperlipidemia  Assessment & Plan  Continue lipitor     Schizophrenia (HCC)- (present on admission)  Assessment & Plan  Continue home medications    KATHIA (obstructive sleep apnea)- (present on admission)  Assessment & Plan  Diagnosed on recent sleep study  Patient states no home CPAP yet.  Previously on CPAP, but mask broken.  CPAP qhs while in hospital ordered.   -- recs to follow up with pulm        VTE prophylaxis: therapeutic anticoagulation with lovenox bid.

## 2022-10-26 ENCOUNTER — PHARMACY VISIT (OUTPATIENT)
Dept: PHARMACY | Facility: MEDICAL CENTER | Age: 47
End: 2022-10-26
Payer: COMMERCIAL

## 2022-10-26 VITALS
WEIGHT: 273.15 LBS | BODY MASS INDEX: 51.57 KG/M2 | HEIGHT: 61 IN | RESPIRATION RATE: 18 BRPM | HEART RATE: 77 BPM | OXYGEN SATURATION: 96 % | DIASTOLIC BLOOD PRESSURE: 77 MMHG | SYSTOLIC BLOOD PRESSURE: 121 MMHG | TEMPERATURE: 97.7 F

## 2022-10-26 PROBLEM — J45.901 SEVERE ASTHMA WITH ACUTE EXACERBATION, UNSPECIFIED WHETHER PERSISTENT: Status: RESOLVED | Noted: 2022-10-23 | Resolved: 2022-10-26

## 2022-10-26 PROBLEM — M54.2 NECK PAIN: Status: RESOLVED | Noted: 2022-10-23 | Resolved: 2022-10-26

## 2022-10-26 LAB
ALBUMIN SERPL BCP-MCNC: 4 G/DL (ref 3.2–4.9)
ANION GAP SERPL CALC-SCNC: 10 MMOL/L (ref 7–16)
BASOPHILS # BLD AUTO: 0.2 % (ref 0–1.8)
BASOPHILS # BLD: 0.01 K/UL (ref 0–0.12)
BUN SERPL-MCNC: 26 MG/DL (ref 8–22)
CALCIUM SERPL-MCNC: 9.7 MG/DL (ref 8.5–10.5)
CHLORIDE SERPL-SCNC: 97 MMOL/L (ref 96–112)
CO2 SERPL-SCNC: 30 MMOL/L (ref 20–33)
CREAT SERPL-MCNC: 1.12 MG/DL (ref 0.5–1.4)
EOSINOPHIL # BLD AUTO: 0 K/UL (ref 0–0.51)
EOSINOPHIL NFR BLD: 0 % (ref 0–6.9)
ERYTHROCYTE [DISTWIDTH] IN BLOOD BY AUTOMATED COUNT: 52.6 FL (ref 35.9–50)
GFR SERPLBLD CREATININE-BSD FMLA CKD-EPI: 60 ML/MIN/1.73 M 2
GLUCOSE SERPL-MCNC: 301 MG/DL (ref 65–99)
HCT VFR BLD AUTO: 36.6 % (ref 37–47)
HGB BLD-MCNC: 11.2 G/DL (ref 12–16)
IMM GRANULOCYTES # BLD AUTO: 0.03 K/UL (ref 0–0.11)
IMM GRANULOCYTES NFR BLD AUTO: 0.5 % (ref 0–0.9)
LYMPHOCYTES # BLD AUTO: 0.46 K/UL (ref 1–4.8)
LYMPHOCYTES NFR BLD: 7.4 % (ref 22–41)
MAGNESIUM SERPL-MCNC: 3 MG/DL (ref 1.5–2.5)
MCH RBC QN AUTO: 26.5 PG (ref 27–33)
MCHC RBC AUTO-ENTMCNC: 30.6 G/DL (ref 33.6–35)
MCV RBC AUTO: 86.7 FL (ref 81.4–97.8)
MONOCYTES # BLD AUTO: 0.12 K/UL (ref 0–0.85)
MONOCYTES NFR BLD AUTO: 1.9 % (ref 0–13.4)
NEUTROPHILS # BLD AUTO: 5.59 K/UL (ref 2–7.15)
NEUTROPHILS NFR BLD: 90 % (ref 44–72)
NRBC # BLD AUTO: 0 K/UL
NRBC BLD-RTO: 0 /100 WBC
PHOSPHATE SERPL-MCNC: 1.9 MG/DL (ref 2.5–4.5)
PLATELET # BLD AUTO: 159 K/UL (ref 164–446)
PMV BLD AUTO: 11.7 FL (ref 9–12.9)
POTASSIUM SERPL-SCNC: 5.2 MMOL/L (ref 3.6–5.5)
RBC # BLD AUTO: 4.22 M/UL (ref 4.2–5.4)
SODIUM SERPL-SCNC: 137 MMOL/L (ref 135–145)
WBC # BLD AUTO: 6.2 K/UL (ref 4.8–10.8)

## 2022-10-26 PROCEDURE — 83735 ASSAY OF MAGNESIUM: CPT

## 2022-10-26 PROCEDURE — A9270 NON-COVERED ITEM OR SERVICE: HCPCS | Performed by: HOSPITALIST

## 2022-10-26 PROCEDURE — 700111 HCHG RX REV CODE 636 W/ 250 OVERRIDE (IP): Performed by: STUDENT IN AN ORGANIZED HEALTH CARE EDUCATION/TRAINING PROGRAM

## 2022-10-26 PROCEDURE — 80069 RENAL FUNCTION PANEL: CPT

## 2022-10-26 PROCEDURE — 94640 AIRWAY INHALATION TREATMENT: CPT

## 2022-10-26 PROCEDURE — A9270 NON-COVERED ITEM OR SERVICE: HCPCS | Performed by: STUDENT IN AN ORGANIZED HEALTH CARE EDUCATION/TRAINING PROGRAM

## 2022-10-26 PROCEDURE — 90686 IIV4 VACC NO PRSV 0.5 ML IM: CPT | Performed by: HOSPITALIST

## 2022-10-26 PROCEDURE — 94669 MECHANICAL CHEST WALL OSCILL: CPT

## 2022-10-26 PROCEDURE — RXMED WILLOW AMBULATORY MEDICATION CHARGE: Performed by: HOSPITALIST

## 2022-10-26 PROCEDURE — 700101 HCHG RX REV CODE 250: Performed by: HOSPITALIST

## 2022-10-26 PROCEDURE — 99239 HOSP IP/OBS DSCHRG MGMT >30: CPT | Performed by: HOSPITALIST

## 2022-10-26 PROCEDURE — 700111 HCHG RX REV CODE 636 W/ 250 OVERRIDE (IP): Performed by: HOSPITALIST

## 2022-10-26 PROCEDURE — 700102 HCHG RX REV CODE 250 W/ 637 OVERRIDE(OP): Performed by: HOSPITALIST

## 2022-10-26 PROCEDURE — 3E02340 INTRODUCTION OF INFLUENZA VACCINE INTO MUSCLE, PERCUTANEOUS APPROACH: ICD-10-PCS | Performed by: HOSPITALIST

## 2022-10-26 PROCEDURE — 302118 SHAMPOO,NO RINSE: Performed by: HOSPITALIST

## 2022-10-26 PROCEDURE — 85025 COMPLETE CBC W/AUTO DIFF WBC: CPT

## 2022-10-26 PROCEDURE — 94618 PULMONARY STRESS TESTING: CPT

## 2022-10-26 PROCEDURE — 700102 HCHG RX REV CODE 250 W/ 637 OVERRIDE(OP): Performed by: STUDENT IN AN ORGANIZED HEALTH CARE EDUCATION/TRAINING PROGRAM

## 2022-10-26 RX ORDER — IPRATROPIUM BROMIDE AND ALBUTEROL SULFATE 2.5; .5 MG/3ML; MG/3ML
3 SOLUTION RESPIRATORY (INHALATION) 4 TIMES DAILY
Status: DISCONTINUED | OUTPATIENT
Start: 2022-10-26 | End: 2022-10-26 | Stop reason: HOSPADM

## 2022-10-26 RX ORDER — METHYLPREDNISOLONE 4 MG/1
TABLET ORAL
Qty: 21 TABLET | Refills: 0 | Status: SHIPPED | OUTPATIENT
Start: 2022-10-26 | End: 2022-10-26

## 2022-10-26 RX ORDER — FUROSEMIDE 10 MG/ML
20 INJECTION INTRAMUSCULAR; INTRAVENOUS ONCE
Status: COMPLETED | OUTPATIENT
Start: 2022-10-26 | End: 2022-10-26

## 2022-10-26 RX ORDER — PREDNISONE 10 MG/1
TABLET ORAL
Qty: 32 TABLET | Refills: 0 | Status: SHIPPED | OUTPATIENT
Start: 2022-10-26 | End: 2022-11-06

## 2022-10-26 RX ORDER — SENNOSIDES A AND B 8.6 MG/1
8.6 TABLET, FILM COATED ORAL
Qty: 30 TABLET | Refills: 0 | Status: SHIPPED | OUTPATIENT
Start: 2022-10-26 | End: 2022-11-13 | Stop reason: SDUPTHER

## 2022-10-26 RX ORDER — BUDESONIDE AND FORMOTEROL FUMARATE DIHYDRATE 80; 4.5 UG/1; UG/1
2 AEROSOL RESPIRATORY (INHALATION) 2 TIMES DAILY
Qty: 1 EACH | Refills: 0 | Status: SHIPPED | OUTPATIENT
Start: 2022-10-26 | End: 2022-10-26

## 2022-10-26 RX ADMIN — METHYLPREDNISOLONE SODIUM SUCCINATE 40 MG: 40 INJECTION, POWDER, FOR SOLUTION INTRAMUSCULAR; INTRAVENOUS at 04:42

## 2022-10-26 RX ADMIN — POLYETHYLENE GLYCOL 3350 1 PACKET: 17 POWDER, FOR SOLUTION ORAL at 04:43

## 2022-10-26 RX ADMIN — DIBASIC SODIUM PHOSPHATE, MONOBASIC POTASSIUM PHOSPHATE AND MONOBASIC SODIUM PHOSPHATE 500 MG: 852; 155; 130 TABLET ORAL at 09:01

## 2022-10-26 RX ADMIN — INFLUENZA A VIRUS A/VICTORIA/2570/2019 IVR-215 (H1N1) ANTIGEN (FORMALDEHYDE INACTIVATED), INFLUENZA A VIRUS A/DARWIN/9/2021 SAN-010 (H3N2) ANTIGEN (FORMALDEHYDE INACTIVATED), INFLUENZA B VIRUS B/PHUKET/3073/2013 ANTIGEN (FORMALDEHYDE INACTIVATED), AND INFLUENZA B VIRUS B/MICHIGAN/01/2021 ANTIGEN (FORMALDEHYDE INACTIVATED) 0.5 ML: 15; 15; 15; 15 INJECTION, SUSPENSION INTRAMUSCULAR at 13:15

## 2022-10-26 RX ADMIN — IPRATROPIUM BROMIDE AND ALBUTEROL SULFATE 3 ML: .5; 2.5 SOLUTION RESPIRATORY (INHALATION) at 11:04

## 2022-10-26 RX ADMIN — ARIPIPRAZOLE 30 MG: 10 TABLET ORAL at 04:42

## 2022-10-26 RX ADMIN — BISACODYL 10 MG: 10 SUPPOSITORY RECTAL at 04:43

## 2022-10-26 RX ADMIN — IPRATROPIUM BROMIDE AND ALBUTEROL SULFATE 3 ML: 2.5; .5 SOLUTION RESPIRATORY (INHALATION) at 02:22

## 2022-10-26 RX ADMIN — BUDESONIDE AND FORMOTEROL FUMARATE DIHYDRATE 2 PUFF: 80; 4.5 AEROSOL RESPIRATORY (INHALATION) at 07:38

## 2022-10-26 RX ADMIN — FUROSEMIDE 20 MG: 10 INJECTION, SOLUTION INTRAMUSCULAR; INTRAVENOUS at 09:00

## 2022-10-26 RX ADMIN — ENOXAPARIN SODIUM 60 MG: 60 INJECTION SUBCUTANEOUS at 04:42

## 2022-10-26 RX ADMIN — FAMOTIDINE 20 MG: 10 INJECTION, SOLUTION INTRAVENOUS at 04:43

## 2022-10-26 ASSESSMENT — PAIN DESCRIPTION - PAIN TYPE: TYPE: CHRONIC PAIN

## 2022-10-26 NOTE — PROGRESS NOTES
[place] [facility] Patient provided w/Taxi voucher. Prescriptions brought to bedside. All belongings taken.

## 2022-10-26 NOTE — DISCHARGE SUMMARY
Discharge Summary    CHIEF COMPLAINT ON ADMISSION  Chief Complaint   Patient presents with    Back Pain     Began x2 days ago, upper cervical back pain radiates down thoracic/lumbar region, hx back pain, prior diagnosis of C4 and S1 nerve damage       Reason for Admission  EMS     Admission Date  10/22/2022    CODE STATUS  Full Code    HPI & HOSPITAL COURSE  This is a 47-year-old female with a past medical significant for morbid obesity with a BMI of 48.44, stable obstructive sleep apnea, obesity hypoventilation syndrome, asthma, chronic respiratory failure on 3L of oxygen, ongoing nicotine dependence, history of 30 pack smoking history, dyslipidemia, schizophrenia presented to the ER on 12/13/2020 with a complaint of shortness of breath.    On presentation in the ER, she is noted to be short of breath and medicating higher oxygen than her normal, surgery started on albuterol, breathing treatment, IV steroid.    Her oxygenation improving, currently patient is saturating well on 3 L of oxygen which is at her baseline.  She was counseled extensively in regards to the adverse effect of smoking.    She was recommended to use inhalers as an outpatient.  She was discharged on tapering dose of prednisone.  For all other chronic medical condition, she will resume her home medication.  At this time she is medically stable to be discharged home.    Therefore, she is discharged in fair and stable condition to home with close outpatient follow-up.    The patient met 2-midnight criteria for an inpatient stay at the time of discharge.    Discharge Date  10/26/2022    FOLLOW UP ITEMS POST DISCHARGE  Heriberto Sharp M.D.  Please follow up with pulm a ng op    DISCHARGE DIAGNOSES  Principal Problem:    Obesity hypoventilation syndrome (HCC) POA: Yes  Active Problems:    KATHIA (obstructive sleep apnea) POA: Yes    Schizophrenia (HCC) POA: Yes    Hyperlipidemia POA: Unknown    Neck pain POA: Unknown    Severe asthma with acute  exacerbation, unspecified whether persistent POA: Yes    Status post placement of implantable loop recorder POA: Yes    Acute respiratory failure with hypoxia and hypercapnia ([facility]) POA: Unknown    Class 3 severe obesity in adult (HCC) POA: Unknown  Resolved Problems:    COPD with acute exacerbation ([facility]) POA: Yes      FOLLOW UP  Future Appointments   Date Time Provider Department Center   11/1/2022  1:40 PM Nader Barraza M.D. [facility] None   11/15/2022  4:00 PM CRISTOBAL Sahu [facility]   11/16/2022  2:00 PM CRISTOBAL Azul [facility] None   11/17/2022  1:00 PM Oz Borrego M.D. [facility] None     Heriberto Sharp M.D.  [address] [place] [id number]  [phone]    Follow up        MEDICATIONS ON DISCHARGE     Medication List        START taking these medications        Instructions   Phospha 250 Neutral 250 MG tablet  Generic drug: phosphorus   Take 1 Tablet by mouth 2 times a day for 3 days.  Dose: 1 Tablet     Senna 8.6 MG Tabs   Take 1 Tablet by mouth 1 time a day as needed (constipation).  Dose: 8.6 mg            CHANGE how you take these medications        Instructions   predniSONE 10 MG Tabs  Start taking on: October 26, 2022  What changed:   medication strength  See the new instructions.  Commonly known as: DELTASONE   Take 4 Tablets by mouth every day for 3 days, THEN 3 Tablets every day for 3 days, THEN 2 Tablets every day for 3 days, THEN 1 Tablet every day for 3 days, THEN 0.5 Tablets every day for 3 days.            CONTINUE taking these medications        Instructions   aripiprazole 30 MG tablet  Commonly known as: ABILIFY   Take 30 mg by mouth every morning.  Dose: 30 mg     atorvastatin 20 MG Tabs  Commonly known as: LIPITOR   Take 20 mg by mouth at bedtime.  Dose: 20 mg     famotidine 20 MG Tabs  Commonly known as: PEPCID   Take 1 Tablet by mouth 2 times a day.  Dose: 20 mg     fluticasone-salmeterol 100-50 MCG/ACT Aepb  Commonly known as: Advair   Inhale 1 Puff every 12  hours.  Dose: 1 Puff     furosemide 40 MG Tabs  Commonly known as: LASIX   Take 1 Tablet by mouth every day for 30 days.  Dose: 40 mg     methocarbamol 750 MG Tabs  Commonly known as: ROBAXIN   Take 1 Tablet by mouth 4 times a day. Take as needed for muscle spasm/strain.  Dose: 750 mg     montelukast 10 MG Tabs  Commonly known as: SINGULAIR   Take 10 mg by mouth every evening.  Dose: 10 mg     traZODone 50 MG Tabs  Commonly known as: DESYREL   Take 1 Tablet by mouth at bedtime.  Dose: 50 mg     Ventolin  (90 Base) MCG/ACT Aers inhalation aerosol  Generic drug: albuterol   Inhale 2 Puffs every 6 hours as needed for Shortness of Breath.  Dose: 2 Puff            STOP taking these medications      azithromycin 250 MG Tabs  Commonly known as: ZITHROMAX     methylPREDNISolone 4 MG Tbpk  Commonly known as: MEDROL DOSEPAK     nystatin 716808 UNIT/GM Crea topical cream  Commonly known as: MYCOSTATIN     potassium chloride SA 10 MEQ Tbcr  Commonly known as: K-DUR              Allergies  Allergies   Allergen Reactions    Penicillin G Rash and Itching     pt reports that she gets a rash all over her body and gets itchy    Amoxicillin Rash and Itching     Tolerates cephalosporins, pt reports that she gets a rash all over her body and gets itchy       DIET  Orders Placed This Encounter   Procedures    Diet Order Diet: Regular     Standing Status:   Standing     Number of Occurrences:   1     Order Specific Question:   Diet:     Answer:   Regular [1]       ACTIVITY  As tolerated.  Weight bearing as tolerated    CONSULTATIONS  pulm    PROCEDURES  none    LABORATORY  Lab Results   Component Value Date    SODIUM 137 10/26/2022    POTASSIUM 5.2 10/26/2022    CHLORIDE 97 10/26/2022    CO2 30 10/26/2022    GLUCOSE 301 (H) 10/26/2022    BUN 26 (H) 10/26/2022    CREATININE 1.12 10/26/2022    CREATININE 0.9 09/11/2008        Lab Results   Component Value Date    WBC 6.2 10/26/2022    HEMOGLOBIN 11.2 (L) 10/26/2022    HEMATOCRIT 36.6  (L) 10/26/2022    PLATELETCT 159 (L) 10/26/2022        Total time of the discharge process exceeds 39 minutes.

## 2022-10-26 NOTE — CARE PLAN
Problem: Knowledge Deficit - Standard  Goal: Patient and family/care givers will demonstrate understanding of plan of care, disease process/condition, diagnostic tests and medications  Outcome: Progressing     Problem: Knowledge Deficit - COPD  Goal: Patient/significant other demonstrates understanding of disease process, utilization of the Action Plan, medications and discharge instruction  Outcome: Progressing      Problem: Ineffective Airway Clearance  Goal: Patient will maintain patent airway with clear/clearing breath sounds  Outcome: Progressing   The patient is Stable - Low risk of patient condition declining or worsening    Shift Goals  Clinical Goals: Respiratory status, ween O2, pain control  Patient Goals: Rest  Family Goals: ARAM    Progress made toward(s) clinical / shift goals:  Pt educated on COPD.Pt verbalizes understanding of POC. Pt is able to clear airway. RT involved and providing treatments.    Patient is not progressing towards the following goals:

## 2022-10-26 NOTE — CARE PLAN
Problem: Knowledge Deficit - Standard  Goal: Patient and family/care givers will demonstrate understanding of plan of care, disease process/condition, diagnostic tests and medications  Outcome: Progressing  Note: POC discussed with pt-pt verbalized understanding     Problem: Pain - Standard  Goal: Alleviation of pain or a reduction in pain to the patient’s comfort goal  Outcome: Progressing  Note: Pain managed with medication and rest   The patient is Stable - Low risk of patient condition declining or worsening    Shift Goals  Clinical Goals: Respiratory status, ween O2, pain control  Patient Goals: Rest  Family Goals: ARAM    Progress made toward(s) clinical / shift goals:      Patient is not progressing towards the following goals:

## 2022-10-26 NOTE — HOSPITAL COURSE
This is a 47-year-old female with a past medical significant for morbid obesity with a BMI of 48.44, stable obstructive sleep apnea, obesity hypoventilation syndrome, asthma, chronic respiratory failure on 3L of oxygen, ongoing nicotine dependence, history of 30 pack smoking history, dyslipidemia, schizophrenia presented to the ER on 12/13/2020 with a complaint of shortness of breath.    On presentation in the ER, she is noted to be short of breath and medicating higher oxygen than her normal, surgery started on albuterol, breathing treatment, IV steroid.    Her oxygenation improving, currently patient is saturating well on 3 L of oxygen which is at her baseline.  She was counseled extensively in regards to the adverse effect of smoking.    She was recommended to use inhalers as an outpatient.  She was discharged on tapering dose of prednisone.      She was complaining of the neck pain, upon my evaluation on 10/r25; patient denied any neck pain.  MRI was attempted but unable to fit secondary to patient BMI.  She will need outpatient MRI of her neck if her pain become Constant continue to persist/got worsened.  This is discussed with the patient    For all other chronic medical condition, she will resume her home medication.  At this time she is medically stable to be discharged home.

## 2022-10-26 NOTE — DISCHARGE PLANNING
Case Management Discharge Planning    Admission Date: 10/22/2022  GMLOS: 2.8  ALOS: 3    6-Clicks ADL Score: 24  6-Clicks Mobility Score: 23      Anticipated Discharge Dispo:      DME Needed: No    Action(s) Taken: Updated Provider/Nurse on Discharge Plan    Escalations Completed: None    Medically Clear: Yes    Next Steps: Pt needs home 02. Pt already on service with [facility] and has port tank at bedside. No other needs identified.    Barriers to Discharge: None    Is the patient up for discharge tomorrow: No, Discharge to home today

## 2022-10-26 NOTE — CARE PLAN
Problem: Bronchoconstriction  Goal: Improve in air movement and diminished wheezing  Description: Target End Date:  2 to 3 days    1.  Implement inhaled treatments  2.  Evaluate and manage medication effects  Outcome: Progressing     Problem: Bronchopulmonary Hygiene  Goal: Increase mobilization of retained secretions  Description: Target End Date:  2 to 3 days    1.  Perform bronchopulmonary therapy as indicated by assessment  2.  Perform airway suctioning  3.  Perform actions to maintain patient airway  Outcome: Progressing

## 2022-11-03 DIAGNOSIS — K21.9 GASTROESOPHAGEAL REFLUX DISEASE, UNSPECIFIED WHETHER ESOPHAGITIS PRESENT: ICD-10-CM

## 2022-11-03 RX ORDER — FAMOTIDINE 20 MG/1
TABLET, FILM COATED ORAL
Qty: 60 TABLET | Refills: 0 | OUTPATIENT
Start: 2022-11-03

## 2022-11-05 ENCOUNTER — HOSPITAL ENCOUNTER (INPATIENT)
Facility: MEDICAL CENTER | Age: 47
LOS: 3 days | DRG: 189 | End: 2022-11-09
Attending: STUDENT IN AN ORGANIZED HEALTH CARE EDUCATION/TRAINING PROGRAM | Admitting: STUDENT IN AN ORGANIZED HEALTH CARE EDUCATION/TRAINING PROGRAM
Payer: MEDICAID

## 2022-11-05 ENCOUNTER — APPOINTMENT (OUTPATIENT)
Dept: RADIOLOGY | Facility: MEDICAL CENTER | Age: 47
DRG: 189 | End: 2022-11-05
Attending: STUDENT IN AN ORGANIZED HEALTH CARE EDUCATION/TRAINING PROGRAM
Payer: MEDICAID

## 2022-11-05 DIAGNOSIS — E66.2 CLASS 3 OBESITY WITH ALVEOLAR HYPOVENTILATION, SERIOUS COMORBIDITY, AND BODY MASS INDEX (BMI) OF 50.0 TO 59.9 IN ADULT (HCC): ICD-10-CM

## 2022-11-05 DIAGNOSIS — E66.9 RESTRICTIVE LUNG DISEASE SECONDARY TO OBESITY: ICD-10-CM

## 2022-11-05 DIAGNOSIS — R13.12 OROPHARYNGEAL DYSPHAGIA: ICD-10-CM

## 2022-11-05 DIAGNOSIS — J44.1 ACUTE EXACERBATION OF CHRONIC OBSTRUCTIVE PULMONARY DISEASE (COPD) (HCC): ICD-10-CM

## 2022-11-05 DIAGNOSIS — J98.4 RESTRICTIVE LUNG DISEASE SECONDARY TO OBESITY: ICD-10-CM

## 2022-11-05 DIAGNOSIS — J96.12 CHRONIC RESPIRATORY FAILURE WITH HYPOXIA AND HYPERCAPNIA (HCC): ICD-10-CM

## 2022-11-05 DIAGNOSIS — J96.11 CHRONIC RESPIRATORY FAILURE WITH HYPOXIA AND HYPERCAPNIA (HCC): ICD-10-CM

## 2022-11-05 DIAGNOSIS — R06.02 SHORTNESS OF BREATH: ICD-10-CM

## 2022-11-05 DIAGNOSIS — G47.33 OSA (OBSTRUCTIVE SLEEP APNEA): ICD-10-CM

## 2022-11-05 DIAGNOSIS — R79.89 TROPONIN LEVEL ELEVATED: ICD-10-CM

## 2022-11-05 DIAGNOSIS — E66.2 MORBID OBESITY WITH ALVEOLAR HYPOVENTILATION (HCC): ICD-10-CM

## 2022-11-05 DIAGNOSIS — E66.2 OBESITY HYPOVENTILATION SYNDROME (HCC): ICD-10-CM

## 2022-11-05 LAB
ALBUMIN SERPL BCP-MCNC: 3.9 G/DL (ref 3.2–4.9)
ALBUMIN/GLOB SERPL: 1.5 G/DL
ALP SERPL-CCNC: 97 U/L (ref 30–99)
ALT SERPL-CCNC: 24 U/L (ref 2–50)
ANION GAP SERPL CALC-SCNC: 10 MMOL/L (ref 7–16)
AST SERPL-CCNC: 13 U/L (ref 12–45)
BASOPHILS # BLD AUTO: 0.1 % (ref 0–1.8)
BASOPHILS # BLD: 0.01 K/UL (ref 0–0.12)
BILIRUB SERPL-MCNC: 1 MG/DL (ref 0.1–1.5)
BUN SERPL-MCNC: 16 MG/DL (ref 8–22)
CALCIUM SERPL-MCNC: 9.5 MG/DL (ref 8.5–10.5)
CHLORIDE SERPL-SCNC: 106 MMOL/L (ref 96–112)
CO2 SERPL-SCNC: 27 MMOL/L (ref 20–33)
COMMENT 1642: NORMAL
CREAT SERPL-MCNC: 0.88 MG/DL (ref 0.5–1.4)
EOSINOPHIL # BLD AUTO: 0.07 K/UL (ref 0–0.51)
EOSINOPHIL NFR BLD: 0.6 % (ref 0–6.9)
ERYTHROCYTE [DISTWIDTH] IN BLOOD BY AUTOMATED COUNT: 57.4 FL (ref 35.9–50)
GFR SERPLBLD CREATININE-BSD FMLA CKD-EPI: 80 ML/MIN/1.73 M 2
GLOBULIN SER CALC-MCNC: 2.6 G/DL (ref 1.9–3.5)
GLUCOSE SERPL-MCNC: 161 MG/DL (ref 65–99)
HCT VFR BLD AUTO: 41.1 % (ref 37–47)
HGB BLD-MCNC: 12.3 G/DL (ref 12–16)
HYPOCHROMIA BLD QL SMEAR: ABNORMAL
IMM GRANULOCYTES # BLD AUTO: 0.08 K/UL (ref 0–0.11)
IMM GRANULOCYTES NFR BLD AUTO: 0.7 % (ref 0–0.9)
LYMPHOCYTES # BLD AUTO: 1.69 K/UL (ref 1–4.8)
LYMPHOCYTES NFR BLD: 13.8 % (ref 22–41)
MCH RBC QN AUTO: 26.3 PG (ref 27–33)
MCHC RBC AUTO-ENTMCNC: 29.9 G/DL (ref 33.6–35)
MCV RBC AUTO: 87.8 FL (ref 81.4–97.8)
MONOCYTES # BLD AUTO: 0.81 K/UL (ref 0–0.85)
MONOCYTES NFR BLD AUTO: 6.6 % (ref 0–13.4)
MORPHOLOGY BLD-IMP: NORMAL
NEUTROPHILS # BLD AUTO: 9.63 K/UL (ref 2–7.15)
NEUTROPHILS NFR BLD: 78.2 % (ref 44–72)
NRBC # BLD AUTO: 0 K/UL
NRBC BLD-RTO: 0 /100 WBC
NT-PROBNP SERPL IA-MCNC: 238 PG/ML (ref 0–125)
PLATELET # BLD AUTO: 210 K/UL (ref 164–446)
PLATELET BLD QL SMEAR: NORMAL
PMV BLD AUTO: 10.7 FL (ref 9–12.9)
POTASSIUM SERPL-SCNC: 4.3 MMOL/L (ref 3.6–5.5)
PROT SERPL-MCNC: 6.5 G/DL (ref 6–8.2)
RBC # BLD AUTO: 4.68 M/UL (ref 4.2–5.4)
RBC BLD AUTO: PRESENT
SODIUM SERPL-SCNC: 143 MMOL/L (ref 135–145)
TROPONIN T SERPL-MCNC: 26 NG/L (ref 6–19)
WBC # BLD AUTO: 12.3 K/UL (ref 4.8–10.8)

## 2022-11-05 PROCEDURE — 94640 AIRWAY INHALATION TREATMENT: CPT

## 2022-11-05 PROCEDURE — 85025 COMPLETE CBC W/AUTO DIFF WBC: CPT

## 2022-11-05 PROCEDURE — 700101 HCHG RX REV CODE 250: Performed by: STUDENT IN AN ORGANIZED HEALTH CARE EDUCATION/TRAINING PROGRAM

## 2022-11-05 PROCEDURE — 71045 X-RAY EXAM CHEST 1 VIEW: CPT

## 2022-11-05 PROCEDURE — 84484 ASSAY OF TROPONIN QUANT: CPT

## 2022-11-05 PROCEDURE — 83880 ASSAY OF NATRIURETIC PEPTIDE: CPT

## 2022-11-05 PROCEDURE — 96365 THER/PROPH/DIAG IV INF INIT: CPT

## 2022-11-05 PROCEDURE — 99285 EMERGENCY DEPT VISIT HI MDM: CPT

## 2022-11-05 PROCEDURE — 96375 TX/PRO/DX INJ NEW DRUG ADDON: CPT

## 2022-11-05 PROCEDURE — 36415 COLL VENOUS BLD VENIPUNCTURE: CPT

## 2022-11-05 PROCEDURE — 80053 COMPREHEN METABOLIC PANEL: CPT

## 2022-11-05 PROCEDURE — 700111 HCHG RX REV CODE 636 W/ 250 OVERRIDE (IP): Performed by: STUDENT IN AN ORGANIZED HEALTH CARE EDUCATION/TRAINING PROGRAM

## 2022-11-05 PROCEDURE — A9270 NON-COVERED ITEM OR SERVICE: HCPCS | Performed by: STUDENT IN AN ORGANIZED HEALTH CARE EDUCATION/TRAINING PROGRAM

## 2022-11-05 PROCEDURE — 700102 HCHG RX REV CODE 250 W/ 637 OVERRIDE(OP): Performed by: STUDENT IN AN ORGANIZED HEALTH CARE EDUCATION/TRAINING PROGRAM

## 2022-11-05 RX ORDER — AZITHROMYCIN 250 MG/1
500 TABLET, FILM COATED ORAL ONCE
Status: COMPLETED | OUTPATIENT
Start: 2022-11-05 | End: 2022-11-05

## 2022-11-05 RX ORDER — IPRATROPIUM BROMIDE AND ALBUTEROL SULFATE 2.5; .5 MG/3ML; MG/3ML
3 SOLUTION RESPIRATORY (INHALATION)
Status: COMPLETED | OUTPATIENT
Start: 2022-11-05 | End: 2022-11-05

## 2022-11-05 RX ORDER — METHYLPREDNISOLONE SODIUM SUCCINATE 125 MG/2ML
125 INJECTION, POWDER, LYOPHILIZED, FOR SOLUTION INTRAMUSCULAR; INTRAVENOUS ONCE
Status: COMPLETED | OUTPATIENT
Start: 2022-11-05 | End: 2022-11-05

## 2022-11-05 RX ADMIN — IPRATROPIUM BROMIDE AND ALBUTEROL SULFATE 3 ML: .5; 2.5 SOLUTION RESPIRATORY (INHALATION) at 23:09

## 2022-11-05 RX ADMIN — IPRATROPIUM BROMIDE AND ALBUTEROL SULFATE 3 ML: .5; 2.5 SOLUTION RESPIRATORY (INHALATION) at 22:42

## 2022-11-05 RX ADMIN — METHYLPREDNISOLONE SODIUM SUCCINATE 125 MG: 125 INJECTION, POWDER, FOR SOLUTION INTRAMUSCULAR; INTRAVENOUS at 23:14

## 2022-11-05 RX ADMIN — IPRATROPIUM BROMIDE AND ALBUTEROL SULFATE 3 ML: .5; 2.5 SOLUTION RESPIRATORY (INHALATION) at 23:04

## 2022-11-05 RX ADMIN — AZITHROMYCIN MONOHYDRATE 500 MG: 250 TABLET ORAL at 23:14

## 2022-11-05 ASSESSMENT — FIBROSIS 4 INDEX: FIB4 SCORE: 0.81

## 2022-11-06 PROBLEM — K21.9 GERD (GASTROESOPHAGEAL REFLUX DISEASE): Status: ACTIVE | Noted: 2022-11-06

## 2022-11-06 PROBLEM — G47.00 INSOMNIA: Status: ACTIVE | Noted: 2022-11-06

## 2022-11-06 PROBLEM — R73.9 HYPERGLYCEMIA: Status: ACTIVE | Noted: 2022-11-06

## 2022-11-06 PROBLEM — J45.909 ASTHMA: Status: ACTIVE | Noted: 2022-09-02

## 2022-11-06 PROBLEM — R06.02 SHORTNESS OF BREATH: Status: ACTIVE | Noted: 2022-11-06

## 2022-11-06 PROBLEM — R79.89 ELEVATED TROPONIN: Status: ACTIVE | Noted: 2022-11-06

## 2022-11-06 LAB
ALBUMIN SERPL BCP-MCNC: 3.8 G/DL (ref 3.2–4.9)
ALBUMIN/GLOB SERPL: 1.4 G/DL
ALP SERPL-CCNC: 99 U/L (ref 30–99)
ALT SERPL-CCNC: 24 U/L (ref 2–50)
AMPHET UR QL SCN: NEGATIVE
ANION GAP SERPL CALC-SCNC: 12 MMOL/L (ref 7–16)
AST SERPL-CCNC: 13 U/L (ref 12–45)
BARBITURATES UR QL SCN: NEGATIVE
BASE EXCESS BLDA CALC-SCNC: 0 MMOL/L (ref -4–3)
BASE EXCESS BLDA CALC-SCNC: 4 MMOL/L (ref -4–3)
BASOPHILS # BLD AUTO: 0.2 % (ref 0–1.8)
BASOPHILS # BLD: 0.02 K/UL (ref 0–0.12)
BENZODIAZ UR QL SCN: NEGATIVE
BILIRUB SERPL-MCNC: 0.8 MG/DL (ref 0.1–1.5)
BODY TEMPERATURE: 36.6 CENTIGRADE
BODY TEMPERATURE: 36.9 CENTIGRADE
BUN SERPL-MCNC: 19 MG/DL (ref 8–22)
BZE UR QL SCN: NEGATIVE
CALCIUM SERPL-MCNC: 9.4 MG/DL (ref 8.5–10.5)
CANNABINOIDS UR QL SCN: NEGATIVE
CHLORIDE SERPL-SCNC: 104 MMOL/L (ref 96–112)
CO2 SERPL-SCNC: 24 MMOL/L (ref 20–33)
CREAT SERPL-MCNC: 1 MG/DL (ref 0.5–1.4)
D DIMER PPP IA.FEU-MCNC: <0.27 UG/ML (FEU) (ref 0–0.5)
EKG IMPRESSION: NORMAL
EOSINOPHIL # BLD AUTO: 0 K/UL (ref 0–0.51)
EOSINOPHIL NFR BLD: 0 % (ref 0–6.9)
ERYTHROCYTE [DISTWIDTH] IN BLOOD BY AUTOMATED COUNT: 58.5 FL (ref 35.9–50)
ETHANOL BLD-MCNC: <10.1 MG/DL
FLUAV RNA SPEC QL NAA+PROBE: NEGATIVE
FLUBV RNA SPEC QL NAA+PROBE: NEGATIVE
GFR SERPLBLD CREATININE-BSD FMLA CKD-EPI: 69 ML/MIN/1.73 M 2
GLOBULIN SER CALC-MCNC: 2.8 G/DL (ref 1.9–3.5)
GLUCOSE SERPL-MCNC: 251 MG/DL (ref 65–99)
HCO3 BLDA-SCNC: 27 MMOL/L (ref 17–25)
HCO3 BLDA-SCNC: 30 MMOL/L (ref 17–25)
HCT VFR BLD AUTO: 41.1 % (ref 37–47)
HGB BLD-MCNC: 12.2 G/DL (ref 12–16)
IMM GRANULOCYTES # BLD AUTO: 0.07 K/UL (ref 0–0.11)
IMM GRANULOCYTES NFR BLD AUTO: 0.6 % (ref 0–0.9)
LYMPHOCYTES # BLD AUTO: 0.25 K/UL (ref 1–4.8)
LYMPHOCYTES NFR BLD: 2 % (ref 22–41)
MCH RBC QN AUTO: 26.5 PG (ref 27–33)
MCHC RBC AUTO-ENTMCNC: 29.7 G/DL (ref 33.6–35)
MCV RBC AUTO: 89.2 FL (ref 81.4–97.8)
METHADONE UR QL SCN: NEGATIVE
MONOCYTES # BLD AUTO: 0.06 K/UL (ref 0–0.85)
MONOCYTES NFR BLD AUTO: 0.5 % (ref 0–13.4)
NEUTROPHILS # BLD AUTO: 11.89 K/UL (ref 2–7.15)
NEUTROPHILS NFR BLD: 96.7 % (ref 44–72)
NRBC # BLD AUTO: 0 K/UL
NRBC BLD-RTO: 0 /100 WBC
OPIATES UR QL SCN: NEGATIVE
OXYCODONE UR QL SCN: NEGATIVE
PCO2 BLDA: 53 MMHG (ref 26–37)
PCO2 BLDA: 55 MMHG (ref 26–37)
PCP UR QL SCN: NEGATIVE
PH BLDA: 7.32 [PH] (ref 7.4–7.5)
PH BLDA: 7.36 [PH] (ref 7.4–7.5)
PLATELET # BLD AUTO: 194 K/UL (ref 164–446)
PMV BLD AUTO: 10.6 FL (ref 9–12.9)
PO2 BLDA: 76.9 MMHG (ref 64–87)
PO2 BLDA: 79.3 MMHG (ref 64–87)
POTASSIUM SERPL-SCNC: 5 MMOL/L (ref 3.6–5.5)
PROCALCITONIN SERPL-MCNC: 0.07 NG/ML
PROPOXYPH UR QL SCN: NEGATIVE
PROT SERPL-MCNC: 6.6 G/DL (ref 6–8.2)
RBC # BLD AUTO: 4.61 M/UL (ref 4.2–5.4)
RSV RNA SPEC QL NAA+PROBE: NEGATIVE
SAO2 % BLDA: 94.3 % (ref 93–99)
SAO2 % BLDA: 94.5 % (ref 93–99)
SARS-COV-2 RNA RESP QL NAA+PROBE: NOTDETECTED
SODIUM SERPL-SCNC: 140 MMOL/L (ref 135–145)
SPECIMEN SOURCE: NORMAL
TROPONIN T SERPL-MCNC: 23 NG/L (ref 6–19)
WBC # BLD AUTO: 12.3 K/UL (ref 4.8–10.8)

## 2022-11-06 PROCEDURE — 700105 HCHG RX REV CODE 258: Performed by: STUDENT IN AN ORGANIZED HEALTH CARE EDUCATION/TRAINING PROGRAM

## 2022-11-06 PROCEDURE — 700111 HCHG RX REV CODE 636 W/ 250 OVERRIDE (IP): Performed by: HOSPITALIST

## 2022-11-06 PROCEDURE — 93010 ELECTROCARDIOGRAM REPORT: CPT | Performed by: INTERNAL MEDICINE

## 2022-11-06 PROCEDURE — 92610 EVALUATE SWALLOWING FUNCTION: CPT

## 2022-11-06 PROCEDURE — 82803 BLOOD GASES ANY COMBINATION: CPT

## 2022-11-06 PROCEDURE — 80307 DRUG TEST PRSMV CHEM ANLYZR: CPT

## 2022-11-06 PROCEDURE — 99223 1ST HOSP IP/OBS HIGH 75: CPT | Mod: AI,GC | Performed by: STUDENT IN AN ORGANIZED HEALTH CARE EDUCATION/TRAINING PROGRAM

## 2022-11-06 PROCEDURE — 80053 COMPREHEN METABOLIC PANEL: CPT

## 2022-11-06 PROCEDURE — 82077 ASSAY SPEC XCP UR&BREATH IA: CPT

## 2022-11-06 PROCEDURE — 85379 FIBRIN DEGRADATION QUANT: CPT

## 2022-11-06 PROCEDURE — 94640 AIRWAY INHALATION TREATMENT: CPT

## 2022-11-06 PROCEDURE — 84484 ASSAY OF TROPONIN QUANT: CPT

## 2022-11-06 PROCEDURE — 700102 HCHG RX REV CODE 250 W/ 637 OVERRIDE(OP): Performed by: STUDENT IN AN ORGANIZED HEALTH CARE EDUCATION/TRAINING PROGRAM

## 2022-11-06 PROCEDURE — 700101 HCHG RX REV CODE 250: Performed by: STUDENT IN AN ORGANIZED HEALTH CARE EDUCATION/TRAINING PROGRAM

## 2022-11-06 PROCEDURE — A9270 NON-COVERED ITEM OR SERVICE: HCPCS | Performed by: STUDENT IN AN ORGANIZED HEALTH CARE EDUCATION/TRAINING PROGRAM

## 2022-11-06 PROCEDURE — 700111 HCHG RX REV CODE 636 W/ 250 OVERRIDE (IP): Performed by: STUDENT IN AN ORGANIZED HEALTH CARE EDUCATION/TRAINING PROGRAM

## 2022-11-06 PROCEDURE — 84145 PROCALCITONIN (PCT): CPT

## 2022-11-06 PROCEDURE — 770020 HCHG ROOM/CARE - TELE (206)

## 2022-11-06 PROCEDURE — C9803 HOPD COVID-19 SPEC COLLECT: HCPCS | Performed by: STUDENT IN AN ORGANIZED HEALTH CARE EDUCATION/TRAINING PROGRAM

## 2022-11-06 PROCEDURE — 85025 COMPLETE CBC W/AUTO DIFF WBC: CPT

## 2022-11-06 PROCEDURE — 0241U HCHG SARS-COV-2 COVID-19 NFCT DS RESP RNA 4 TRGT MIC: CPT

## 2022-11-06 PROCEDURE — 93005 ELECTROCARDIOGRAM TRACING: CPT | Performed by: STUDENT IN AN ORGANIZED HEALTH CARE EDUCATION/TRAINING PROGRAM

## 2022-11-06 PROCEDURE — 87040 BLOOD CULTURE FOR BACTERIA: CPT | Mod: 91

## 2022-11-06 PROCEDURE — 94660 CPAP INITIATION&MGMT: CPT

## 2022-11-06 RX ORDER — BUDESONIDE AND FORMOTEROL FUMARATE DIHYDRATE 80; 4.5 UG/1; UG/1
2 AEROSOL RESPIRATORY (INHALATION)
Status: DISCONTINUED | OUTPATIENT
Start: 2022-11-06 | End: 2022-11-09 | Stop reason: HOSPADM

## 2022-11-06 RX ORDER — SODIUM CHLORIDE, SODIUM LACTATE, POTASSIUM CHLORIDE, CALCIUM CHLORIDE 600; 310; 30; 20 MG/100ML; MG/100ML; MG/100ML; MG/100ML
INJECTION, SOLUTION INTRAVENOUS CONTINUOUS
Status: DISCONTINUED | OUTPATIENT
Start: 2022-11-06 | End: 2022-11-06

## 2022-11-06 RX ORDER — IPRATROPIUM BROMIDE AND ALBUTEROL SULFATE 2.5; .5 MG/3ML; MG/3ML
3 SOLUTION RESPIRATORY (INHALATION)
Status: DISCONTINUED | OUTPATIENT
Start: 2022-11-06 | End: 2022-11-09 | Stop reason: HOSPADM

## 2022-11-06 RX ORDER — FLUTICASONE PROPIONATE AND SALMETEROL 100; 50 UG/1; UG/1
1 POWDER RESPIRATORY (INHALATION) EVERY 12 HOURS
Status: DISCONTINUED | OUTPATIENT
Start: 2022-11-06 | End: 2022-11-06

## 2022-11-06 RX ORDER — ENOXAPARIN SODIUM 100 MG/ML
INJECTION SUBCUTANEOUS
Status: ACTIVE
Start: 2022-11-06 | End: 2022-11-07

## 2022-11-06 RX ORDER — FUROSEMIDE 10 MG/ML
20 INJECTION INTRAMUSCULAR; INTRAVENOUS
Status: DISCONTINUED | OUTPATIENT
Start: 2022-11-06 | End: 2022-11-08

## 2022-11-06 RX ORDER — ENOXAPARIN SODIUM 100 MG/ML
40 INJECTION SUBCUTANEOUS DAILY
Status: DISCONTINUED | OUTPATIENT
Start: 2022-11-06 | End: 2022-11-06

## 2022-11-06 RX ORDER — ENOXAPARIN SODIUM 100 MG/ML
60 INJECTION SUBCUTANEOUS EVERY 12 HOURS
Status: DISCONTINUED | OUTPATIENT
Start: 2022-11-06 | End: 2022-11-09 | Stop reason: HOSPADM

## 2022-11-06 RX ORDER — AZITHROMYCIN 250 MG/1
500 TABLET, FILM COATED ORAL DAILY
Status: DISCONTINUED | OUTPATIENT
Start: 2022-11-06 | End: 2022-11-06

## 2022-11-06 RX ADMIN — IPRATROPIUM BROMIDE AND ALBUTEROL SULFATE 3 ML: .5; 2.5 SOLUTION RESPIRATORY (INHALATION) at 18:39

## 2022-11-06 RX ADMIN — FUROSEMIDE 20 MG: 10 INJECTION, SOLUTION INTRAMUSCULAR; INTRAVENOUS at 16:39

## 2022-11-06 RX ADMIN — IPRATROPIUM BROMIDE AND ALBUTEROL SULFATE 3 ML: .5; 2.5 SOLUTION RESPIRATORY (INHALATION) at 21:25

## 2022-11-06 RX ADMIN — IPRATROPIUM BROMIDE AND ALBUTEROL SULFATE 3 ML: .5; 2.5 SOLUTION RESPIRATORY (INHALATION) at 14:57

## 2022-11-06 RX ADMIN — ENOXAPARIN SODIUM 60 MG: 60 INJECTION SUBCUTANEOUS at 16:39

## 2022-11-06 RX ADMIN — BUDESONIDE AND FORMOTEROL FUMARATE DIHYDRATE 2 PUFF: 80; 4.5 AEROSOL RESPIRATORY (INHALATION) at 06:41

## 2022-11-06 RX ADMIN — SODIUM CHLORIDE, POTASSIUM CHLORIDE, SODIUM LACTATE AND CALCIUM CHLORIDE: 600; 310; 30; 20 INJECTION, SOLUTION INTRAVENOUS at 03:00

## 2022-11-06 RX ADMIN — IPRATROPIUM BROMIDE AND ALBUTEROL SULFATE 3 ML: .5; 2.5 SOLUTION RESPIRATORY (INHALATION) at 06:42

## 2022-11-06 RX ADMIN — CEFTRIAXONE SODIUM 2000 MG: 2 INJECTION, POWDER, FOR SOLUTION INTRAMUSCULAR; INTRAVENOUS at 06:40

## 2022-11-06 ASSESSMENT — PATIENT HEALTH QUESTIONNAIRE - PHQ9
2. FEELING DOWN, DEPRESSED, IRRITABLE, OR HOPELESS: NOT AT ALL
1. LITTLE INTEREST OR PLEASURE IN DOING THINGS: NOT AT ALL
SUM OF ALL RESPONSES TO PHQ QUESTIONS 1-9: 0
SUM OF ALL RESPONSES TO PHQ9 QUESTIONS 1 AND 2: 0
4. FEELING TIRED OR HAVING LITTLE ENERGY: NOT AT ALL
7. TROUBLE CONCENTRATING ON THINGS, SUCH AS READING THE NEWSPAPER OR WATCHING TELEVISION: NOT AT ALL
9. THOUGHTS THAT YOU WOULD BE BETTER OFF DEAD, OR OF HURTING YOURSELF: NOT AT ALL
8. MOVING OR SPEAKING SO SLOWLY THAT OTHER PEOPLE COULD HAVE NOTICED. OR THE OPPOSITE, BEING SO FIGETY OR RESTLESS THAT YOU HAVE BEEN MOVING AROUND A LOT MORE THAN USUAL: NOT AT ALL
6. FEELING BAD ABOUT YOURSELF - OR THAT YOU ARE A FAILURE OR HAVE LET YOURSELF OR YOUR FAMILY DOWN: NOT AL ALL
3. TROUBLE FALLING OR STAYING ASLEEP OR SLEEPING TOO MUCH: NOT AT ALL
5. POOR APPETITE OR OVEREATING: NOT AT ALL

## 2022-11-06 ASSESSMENT — COGNITIVE AND FUNCTIONAL STATUS - GENERAL
STANDING UP FROM CHAIR USING ARMS: A LITTLE
DAILY ACTIVITIY SCORE: 22
WALKING IN HOSPITAL ROOM: A LITTLE
CLIMB 3 TO 5 STEPS WITH RAILING: A LITTLE
SUGGESTED CMS G CODE MODIFIER MOBILITY: CJ
DRESSING REGULAR LOWER BODY CLOTHING: A LITTLE
HELP NEEDED FOR BATHING: A LITTLE
MOBILITY SCORE: 21
SUGGESTED CMS G CODE MODIFIER DAILY ACTIVITY: CJ

## 2022-11-06 ASSESSMENT — FIBROSIS 4 INDEX: FIB4 SCORE: 0.67

## 2022-11-06 NOTE — ED NOTES
"Chief Complaint   Patient presents with   • Cough     BIB EMS c/o Cough/SOB, pt was not wearing her 2L NC o2 on EMS arrival and has not filled her breathing treatments   Pt c/o wheezing and cough         BIB EMS to red 4, pt on monitor, labs drawn and sent. Pt consists of: SOB, cough- per EMS pt was not wearing her home o2 and has not filled/taken her breathing tx  resp even and unlabored- cough noted    Medications given en route: Duoneb, alb tx    /78   Pulse 92   Resp (!) 22   Ht 1.6 m (5' 3\")   Wt (!) 127 kg (280 lb)   LMP  (LMP Unknown)   SpO2 98%   BMI 49.60 kg/m²   "

## 2022-11-06 NOTE — ASSESSMENT & PLAN NOTE
(etiology uncertain, but given SIRS 3/4 and positive CXR, PNA seems most likely. There was also some concern for CO2 narcosis secondary to KATHIA given her excessive somnolence, but strangely this is not reflected by her ABG as her pCO~53 only. Drug intoxication is a possible explanation for her somnolence as well. Cannot r/o PE or viral etiologies at this time, but these also seem unlikely. Recent PFTs r/o COPD. Recent echo r/o HF)  -s/p Azithro x1 in the ED  -s/p IV Methylpred 125mg x1 in the ED  -c/w scheduled duonebs  -CTX/Azithro  -scheduled duonebs for now  -w/u pending as above  -tele

## 2022-11-06 NOTE — ED NOTES
Med rec updated  and complete. Allergies reviewed.  Confirmed  name and date of  birth.  Pt denies antibiotic  pt stated that she was discharged on prednisone and has not starting taking it.        [place] [facility] [phone]

## 2022-11-06 NOTE — PROGRESS NOTES
4 Eyes Skin Assessment Completed by JC Edward and JC Keyes.    Head WDL  Ears WDL  Nose WDL  Mouth WDL  Neck WDL  Breast/Chest WDL  Shoulder Blades WDL  Spine WDL  (R) Arm/Elbow/Hand WDL  (L) Arm/Elbow/Hand WDL  Abdomen Redness and Bruising  Groin Redness  Scrotum/Coccyx/Buttocks WDL  (R) Leg Cracking;Peeling;Redness;Swelling;  (L) Leg Cracking;Peeling;Redness;Swelling;  (R) Heel/Foot/Toe Cracking;Peeling;Redness;Swelling;  (L) Heel/Foot/Toe Cracking;Peeling;Redness;Swelling;          Devices In Places Oxy Mask      Interventions In Place N/A    Possible Skin Injury No    Pictures Uploaded Into Epic N/A  Wound Consult Placed N/A  RN Wound Prevention Protocol Ordered No

## 2022-11-06 NOTE — CARE PLAN
The patient is Stable - Low risk of patient condition declining or worsening    Shift Goals  Clinical Goals: monitor respiratory status  Patient Goals: rest  Family Goals: megan    Progress made toward(s) clinical / shift goals: Pt remains on bed alarm and calling appropriately for assistance.    Patient is not progressing towards the following goals:

## 2022-11-06 NOTE — ED NOTES
Pt placed on cardiac monitor, continuous pulse ox and BP, 2L NC. Call light in reach, side rails up, bed locked and in lowest position, warm blanket provided. Denies any needs at this time. No acute distress noted.

## 2022-11-06 NOTE — ED NOTES
Pt resting in gurney, respirations even, speaking in full sentences at this time, maintaining secretions, no needs at this time.

## 2022-11-06 NOTE — THERAPY
Speech Language Pathology   Initial Assessment     Patient Name: Lorene Haro  AGE:  47 y.o., SEX:  female  Medical Record #: [id number]  Today's Date: 11/6/2022     Precautions  Precautions: Swallow Precautions ( See Comments)    HPI: Pt is 47 y.o. female admitted 11/5 with hx of KATHIA and OHS (on 3L at baseline) who presented with SOB. Per notes, pt has been somnolent and not responding to questions however at time of this SLPs evaluation, pt able to participate in conversation.     PMHx: COVID (2021), ?Asthma, HLD, HTN, ?Schizophrenia/Bipolar 2, ?A-fib, ?Hysterectomy, ?Colectomy, ?cholecystectomy, ?L Knee Arthroscopy, and ?h/o smoking (0.25 ppd).    Chest x-ray on 11/5 reports mild interstitial edema, multifocal pneumonia could have similar appearance.     Level of Consciousness: Awake  Affect/Behavior: Cooperative  Follows Directives: Yes  Orientation: Oriented x 4  Hearing: Functional hearing  Vision: Functional vision    Prior Living Situation & Level of Function: Pt reports she lives alone.     Oral Mechanism Evaluation  Facial Symmetry: Equal  Facial Sensation: Equal  Labial Observations: WFL  Lingual Observations: Midline  Dentition: Scattered dentition  Comments:    Voice  Quality: Harsh  Resonance: WFL  Intensity: Appropriate  Cough: WFL  Comments:    Current Method of Nutrition   NPO until cleared by speech pathology    Assessment  Positioning: Molina's (60-90 degrees)  Bolus Administration: SLP  Oxygen Requirements:  4 L oxymask    Swallowing Trials  Ice: WFL  Thin Liquid (TN0): Impaired  Mildly Thick Liquid (MT2): Impaired  Liquidised (LQ3): Impaired    Comments: Clinical swallow evaluation completed this date. Pt able to hold conversation with SLP. She states she has been having difficulty with swallowing ~ 2 days to 1 week. States she coughs with meals. Of note, pt with gurgly vocal quality prior to initiation of PO, slightly improved with oral suction. PO trials listed above. Single ice chips  consumed with adequate hyolaryngeal elevation, timely initiation of swallow trigger. MTL, thin liquids, and liquidized resulted in inconsistent immediate coughing, increase in wet vocal quality. Delayed coughing also appreciated with MTL, all of which is concerning for penetration/aspiration. Increase in WOB appreciated during PO intake.     Clinical Impressions: Pt is presenting with s/sx concerning for aspiration, suspect acute, unclear etiology but perhaps d/t increase in oxygen demands. Query some level of baseline dysphagia. Pt will need a diagnostic assessment prior to initiation of PO.      Recommendations  1.  NPO pending FEES. Okay for 3-5 single ice chips following oral care with nursing only   2.  Instrumentation: FEES  3.  Swallowing Instructions & Precautions:   Positioning: Fully upright and midline during oral intake  Medication: Non Oral   Oral Care: Q4h  4.  SLP to follow       Plan  Recommend Speech Therapy 3 times per week until therapy goals are met for the following treatments:  Dysphagia Training.    Discharge Recommendations: Recommend post-acute placement for additional speech therapy services prior to discharge home     Objective     11/06/22 1242   Precautions   Precautions Swallow Precautions ( See Comments)   Vitals   O2 (LPM) 4   O2 Delivery Device Oxymask   Pain 0 - 10 Group   Therapist Pain Assessment Post Activity Pain Same as Prior to Activity;Nurse Notified;0   Oral Motor Eval    Is Patient Able to Complete Oral Motor Eval Yes, Within Normal Limits   Laryngeal Function   Voice Quality Minimal   Volutional Cough Within Functional Limits   Excursion Upon Swallow Complete   Oral Food Presentation   Ice Chips Within Functional Limits   Single Swallow Mildly Thick (2) - (Nectar Thick)  Moderate   Single Swallow Thin (0) Severe   Liquidised (3) Moderate   Self Feeding Needs Assistance   Tracheostomy   Tracheostomy  No   Dysphagia Strategies / Recommendations   Strategies / Interventions  "Recommended (Yes / No) Yes   Compensatory Strategies To Be Assessed   Diet / Liquid Recommendation NPO;Pre-Feeding Trials with SLP Only   Medication Administration  Other (See Comments)  (Non orally)   Therapy Interventions Dysphagia Therapy By Speech Language Pathologist   Follow Up SLP Evaluation SLP to follow   Dysphagia Rating   Nutritional Liquid Intake Rating Scale Nothing by mouth   Nutritional Food Intake Rating Scale Nothing by mouth   Patient / Family Goals   Patient / Family Goal #1 \"I've been having trouble swallowing\"   Short Term Goals   Short Term Goal # 1 Pt will consume pre-feeding trials with SLP and no overt s/sx concerning for aspiration.     "

## 2022-11-06 NOTE — ASSESSMENT & PLAN NOTE
Hypercapnia - CO2 currently 31  Suspect KATHIA w/ hypercapnia  11/6 pCO2: 53  Does not have a CPAP at home.    PLAN  -CPAP ordered  -patient alert/oriented x 4   -Moderate oropharyngeal dysphagia on FEES; home health speech recommended  -symbicort  -duonebs prn  -Continue 1 more dose of azithromycin

## 2022-11-06 NOTE — ED NOTES
Pt requesting to use BR, refusing to ambulate at this time. Placed on hospital bed with scale, purewick placed, pt currently A&Ox4, speaking in full sentences, maintaining alertness during conversation with RN at this time.

## 2022-11-06 NOTE — H&P
"History & Physical Note    Date of Admission: 11/6/2022  Admission Status: Inpatient  [facility] Team: YAQUELIN  Attending: Bud Tellez M.D.   Senior Resident: Dr. Gunter  Contact Number: [phone]    ID  47F with a h/o KATHIA and OHS (on 3L at baseline) who presented with SOB on 11/6/2022.    HPI  Neema Haro is a 47 year old female with a h/o KATHIA (?uncertain if compliant with CPAP), OHS (on 3L NC at baseline during the day, with PFT in 2021 showing FEV1/FVC~86% predicted, FEV1~48% predicted, with a positive bronchodilator response), COVID (2021), ?Asthma, HLD, HTN, ?Schizophrenia/Bipolar 2, ?A-fib, ?Hysterectomy, ?Colectomy, ?cholecystectomy, ?L Knee Arthroscopy, and ?h/o smoking (0.25 ppd).    She presented with SOB on 11/6.      Unfortunately, history is limited by patient's severe somnolence. Pt is able to mumble incoherent responses, but is unable to make conversation at this time. I spent several minutes sternal rubbing her and speaking very loudly in attempt to get her to respond.  After telling her, \"I need you talk to me so I can figure out what is going on,\" unfortunately, pt was only able to tell me, \"I am here because I am sick. I have CP and a cough and I dont remember anything else.\" After saying this, she immediately fell back asleep.      Attempts to call family were unsuccessful.     Review of Systems  -unable to obtain due to excessive somnolence      Past Medical History  -see HPI    Past Surgical History  -see HPI    Medications     Prior to Admission Medications   Prescriptions Last Dose Informant Patient Reported? Taking?   albuterol 108 (90 Base) MCG/ACT Aero Soln inhalation aerosol 11/4/2022 at 1800 Patient No No   Sig: Inhale 2 Puffs every 6 hours as needed for Shortness of Breath.   aripiprazole (ABILIFY) 30 MG tablet 11/4/2022 at 1030 Patient Yes No   Sig: Take 30 mg by mouth every morning.   atorvastatin (LIPITOR) 20 MG Tab 11/4/2022 at 2030 Patient Yes No   Sig: Take 20 mg by mouth at " bedtime.   famotidine (PEPCID) 20 MG Tab 11/4/2022 at 2030 Patient No No   Sig: Take 1 Tablet by mouth 2 times a day.   fluticasone-salmeterol (ADVAIR) 100-50 MCG/ACT AEROSOL POWDER, BREATH ACTIVATED 11/4/2022 at 2030 Patient No No   Sig: Inhale 1 Puff every 12 hours.   montelukast (SINGULAIR) 10 MG Tab 11/4/2022 at 2030 Patient Yes No   Sig: Take 10 mg by mouth every evening.   sennosides (SENOKOT) 8.6 MG Tab 11/4/2022 at 2030 Patient No No   Sig: Take 1 Tablet by mouth 1 time a day as needed (constipation).   traZODone (DESYREL) 50 MG Tab 11/4/2022 at 2030 Patient No No   Sig: Take 1 Tablet by mouth at bedtime.      Facility-Administered Medications: None        Allergies    Allergies   Allergen Reactions    Penicillin G Rash and Itching     pt reports that she gets a rash all over her body and gets itchy    Amoxicillin Rash and Itching     Tolerates cephalosporins, pt reports that she gets a rash all over her body and gets itchy       Family History    family history includes Cancer in her sister; No Known Problems in her mother.     Social History  -see HPI    Alcohol: unknown   Recreational drugs (illegal and prescription):  unknown   Living situation:  pt is ambulatory and independent with ADLs at baseline  Primary Care Provider: Jermaine. Heriberto Sharp M.D.          Physical Exam    Vitals:  Temp:  [36.8 °C (98.2 °F)] 36.8 °C (98.2 °F)  Pulse:  [] 98  Resp:  [19-24] 23  BP: (118-141)/() 139/52  SpO2:  [92 %-99 %] 93 %    -General: pt is visibly dyspneic, AO x3 after strong prompting, but she mostly mumbles incoherent responses  -Psych: pt is calm, neutral affect  -Head: no skull deformities, no scalp lacerations   -Eyes: EOMI, no mydriasis or miosis  -ENT: good oral hygeine, no nasal septal deviation  -Cardio: no murmurs or gallops, 1+ LE edema  -Resp: lungs CTAB, symmetric expansion  -Abd: soft and nontender, not distended, no guarding or rebound  -Neuro: no FND, PERKINS  -Skin: no rashes, no  lacerations noted  -MSK: no gross bone deformities, no hematomas noted      Data:  -WBC = 12.3  -Hgb = 12.3   -Cr = 0.88   -proBNP = 238   -Trop T = 26   -ABG = 7.32/53/79       -CXR = interstitial edema, LLL Opacity  -Echo (2022) = negative, no DD    Assessment/Plan  Neema Haro is a 47 year old female with a h/o KATHIA (?uncertain if compliant with CPAP), OHS (on 3L NC at baseline during the day, with PFT in 2021 showing FEV1/FVC~86% predicted, FEV1~48% predicted, with a positive bronchodilator response), COVID (2021), ?Asthma, HLD, HTN, ?Schizophrenia/Bipolar 2, ?A-fib, ?Hysterectomy, ?Colectomy, ?cholecystectomy, ?L Knee Arthroscopy, and ?h/o smoking (0.25 ppd).    She presented with SOB on 11/6.      Hospital Course:  In the ED, pt was given Methylprednisolone 125mg IV x1 as well as duonebs and Azithromycin. Unfortunately, this did not improve her SOB. cx/UDS/ETOH level/repeat trop/EKG/D dimer/RVP/COVID/Flu/RSV/Procal = pending.     * Shortness of breath + Somnolence- (present on admission)  Assessment & Plan  (etiology uncertain, but given SIRS 3/4 and positive CXR, PNA seems most likely. There was also some concern for CO2 narcosis secondary to KATHIA given her excessive somnolence, but strangely this is not reflected by her ABG as her pCO~53 only. Drug intoxication is a possible explanation for her somnolence as well. Cannot r/o PE or viral etiologies at this time, but these also seem unlikely. Recent PFTs r/o COPD. Recent echo r/o HF)  -s/p Azithro x1 in the ED  -s/p IV Methylpred 125mg x1 in the ED  -c/w scheduled duonebs  -CTX/Azithro  -scheduled duonebs for now  -w/u pending as above  -tele    Insomnia  Assessment & Plan  -holding home Trazodone for now given excessive somnolence    ?GERD  Assessment & Plan  -holding home Famotidine pending SLP eval    ?Asthma  Assessment & Plan  -c/w home Advair   -holding home Montelukast psending SLP elizabeth GARCÍAD   Assessment & Plan  -holding home statin pending SLP  eval    ?possible Schizophrenia vs Bipolar?- (present on admission)  Assessment & Plan  -holding home Aripriprazole pending SLP eval        #FEN/GI  -NPO pending SLP eval  -LR = 50 mL/hr while NPO         -DVT ppx = subQ Enoxaparin  -Code Status = Full Code for now     Dispo = pending improvement in AMS and SOB     Endy Gunter, PGY2  Internal Medicine Residency with [facility]     Plan has been discussed with Attending Physician.

## 2022-11-06 NOTE — PROGRESS NOTES
Brief Medicine Attending Note    47-year-old woman with history of KATHIA/OHS appears to be noncompliant CPAP/BiPAP, presenting with shortness of breath x2 days and also found to be quite somnolent.  In past has been treated for COPD exacerbation however PFTs did not correlate with COPD, more suggestive of restrictive/OHS.   Meet sepsis criteria with leukocytosis, tachycardia, tachypnea and x-ray is suggestive of pneumonia.  ABG does show hypercapnia however this appears to be at her baseline as is her bicarb so doubt CO2 narcosis is primary cause of somnolence, more likely with acute illness.  Unclear CPAP/BiPAP compliance with settings so will defer on this, patient is on baseline oxygen.  If patient remains somnolent after initial treatment for CAP, consider repeat ABG and rescue BiPAP if CO2 increasing.  Admit and treat empirically for community-acquired pneumonia, check D-dimer and procalcitonin, check respiratory viral panel including COVID.  Troponin mildly elevated, repeat to ensure stable.    Please refer to forthcoming H&P by Dr. Gunter for complete details.    Bud Tellez M.D.

## 2022-11-06 NOTE — ED PROVIDER NOTES
ED Provider Note    CHIEF COMPLAINT  Chief Complaint   Patient presents with    Cough     BIB EMS c/o Cough/SOB, pt was not wearing her 2L NC o2 on EMS arrival and has not filled her breathing treatments   Pt c/o wheezing and cough         HPI  Lorene Haro is a 47 y.o. female who presents shortness of breath.  She states onset was 2 days ago.  She does use oxygen 2 to 3 L at baseline.  She said that she has had a productive yellow cough and yellow nasal sputum.  No fevers or chills.  She says that her symptoms feel consistent with a COPD exacerbation.  She denies any chest pain.  She does have some chest tightness but denies any pressure-like sensation.  Per EMS report she was not wearing her oxygen on EMS arrival.  Patient states that she might have had contact with someone with 'the flu'.  She feels that her symptoms are worsening.  No other aggravating or alleviating factors appreciated.    She has no history of malignancy, hemoptysis, unilateral leg swelling, prior PE or DVT.  She does smoke and was recently hospitalized.    I reviewed the patient's medical record.  She has had multiple similar visits to the emergency department.  Patient had been admitted 10/1/2022 for chest pain and shortness of breath, 2D echo showed LVEF of 65%, she was treated asthma exacerbation and CHF exacerbation and discharged.  She returned for another admit 10/4/2022 for the same symptoms and treated for PNA, discharged once stable. She returned to the ER 10/12/2022 for worseing symptoms after smoking a cigarette and dischaged home with albuterol inhalers and prednisone    REVIEW OF SYSTEMS  As per HPI, otherwise a 10 point review of systems is negative    PAST MEDICAL HISTORY  Past Medical History:   Diagnosis Date    A-fib (HCC)     Asthma     Bipolar 2 disorder (HCC)     Chickenpox     Hypertension     On home oxygen therapy     Other psychological stress     Schizophrenic disorder (HCC)     Yeast infection of the skin  "2021       SOCIAL HISTORY  Social History     Tobacco Use    Smoking status: Former     Packs/day: 0.25     Types: Cigarettes     Quit date: 10/12/2022     Years since quittin.0    Smokeless tobacco: Never   Vaping Use    Vaping Use: Never used   Substance Use Topics    Alcohol use: Never    Drug use: Never       SURGICAL HISTORY  Past Surgical History:   Procedure Laterality Date    CHOLECYSTECTOMY      COLECTOMY      HYSTERECTOMY LAPAROSCOPY      KNEE ARTHROSCOPY Left        CURRENT MEDICATIONS  Home Medications       Reviewed by Robbie Valentine (Pharmacy Tech) on 22 at 2343  Med List Status: Complete     Medication Last Dose Status   albuterol 108 (90 Base) MCG/ACT Aero Soln inhalation aerosol 2022 Active   aripiprazole (ABILIFY) 30 MG tablet 2022 Active   atorvastatin (LIPITOR) 20 MG Tab 2022 Active   famotidine (PEPCID) 20 MG Tab 2022 Active   fluticasone-salmeterol (ADVAIR) 100-50 MCG/ACT AEROSOL POWDER, BREATH ACTIVATED 2022 Active   montelukast (SINGULAIR) 10 MG Tab 2022 Active   predniSONE (DELTASONE) 10 MG Tab did not start Active   sennosides (SENOKOT) 8.6 MG Tab 2022 Active   traZODone (DESYREL) 50 MG Tab 2022 Active                    ALLERGIES  Allergies   Allergen Reactions    Penicillin G Rash and Itching     pt reports that she gets a rash all over her body and gets itchy    Amoxicillin Rash and Itching     Tolerates cephalosporins, pt reports that she gets a rash all over her body and gets itchy       PHYSICAL EXAM  VITAL SIGNS: BP (!) 147/62   Pulse 92   Temp 36.8 °C (98.2 °F) (Temporal)   Resp (!) 21   Ht 1.6 m (5' 3\")   Wt (!) 127 kg (280 lb)   LMP  (LMP Unknown)   SpO2 96%   BMI 49.60 kg/m²    Constitutional: Awake and alert . Chronically ill appearing   HENT: Normal inspection  dry mucous membranes  Eyes: Normal inspection  Neck: Grossly normal range of motion.  Cardiovascular: Normal heart rate, Normal rhythm.  Symmetric " peripheral pulses.   Thorax & Lungs: Mildly labored breathing with bilateral rales.  No rales, No rhonchi, No chest tenderness.   Abdomen: Soft, non-distended, nontender, no mass  Skin: No obvious rash.  Back: No tenderness, No CVA tenderness.   Extremities: Warm, well perfused.  Chronic bilateral lower extremity edema  Neurologic: Grossly normal   Psychiatric: Normal for situation    RADIOLOGY/PROCEDURES  DX-CHEST-PORTABLE (1 VIEW)   Final Result      1.  Mild interstitial edema.. Multifocal pneumonia could have a similar appearance.   2.  Airspace disease at the peripheral left base.           Imaging is interpreted by radiologist    Labs:  Results for orders placed or performed during the hospital encounter of 11/05/22   CBC WITH DIFFERENTIAL   Result Value Ref Range    WBC 12.3 (H) 4.8 - 10.8 K/uL    RBC 4.68 4.20 - 5.40 M/uL    Hemoglobin 12.3 12.0 - 16.0 g/dL    Hematocrit 41.1 37.0 - 47.0 %    MCV 87.8 81.4 - 97.8 fL    MCH 26.3 (L) 27.0 - 33.0 pg    MCHC 29.9 (L) 33.6 - 35.0 g/dL    RDW 57.4 (H) 35.9 - 50.0 fL    Platelet Count 210 164 - 446 K/uL    MPV 10.7 9.0 - 12.9 fL    Neutrophils-Polys 78.20 (H) 44.00 - 72.00 %    Lymphocytes 13.80 (L) 22.00 - 41.00 %    Monocytes 6.60 0.00 - 13.40 %    Eosinophils 0.60 0.00 - 6.90 %    Basophils 0.10 0.00 - 1.80 %    Immature Granulocytes 0.70 0.00 - 0.90 %    Nucleated RBC 0.00 /100 WBC    Neutrophils (Absolute) 9.63 (H) 2.00 - 7.15 K/uL    Lymphs (Absolute) 1.69 1.00 - 4.80 K/uL    Monos (Absolute) 0.81 0.00 - 0.85 K/uL    Eos (Absolute) 0.07 0.00 - 0.51 K/uL    Baso (Absolute) 0.01 0.00 - 0.12 K/uL    Immature Granulocytes (abs) 0.08 0.00 - 0.11 K/uL    NRBC (Absolute) 0.00 K/uL    Hypochromia 1+    COMP METABOLIC PANEL   Result Value Ref Range    Sodium 143 135 - 145 mmol/L    Potassium 4.3 3.6 - 5.5 mmol/L    Chloride 106 96 - 112 mmol/L    Co2 27 20 - 33 mmol/L    Anion Gap 10.0 7.0 - 16.0    Glucose 161 (H) 65 - 99 mg/dL    Bun 16 8 - 22 mg/dL    Creatinine  0.88 0.50 - 1.40 mg/dL    Calcium 9.5 8.5 - 10.5 mg/dL    AST(SGOT) 13 12 - 45 U/L    ALT(SGPT) 24 2 - 50 U/L    Alkaline Phosphatase 97 30 - 99 U/L    Total Bilirubin 1.0 0.1 - 1.5 mg/dL    Albumin 3.9 3.2 - 4.9 g/dL    Total Protein 6.5 6.0 - 8.2 g/dL    Globulin 2.6 1.9 - 3.5 g/dL    A-G Ratio 1.5 g/dL   TROPONIN   Result Value Ref Range    Troponin T 26 (H) 6 - 19 ng/L   proBrain Natriuretic Peptide, NT   Result Value Ref Range    NT-proBNP 238 (H) 0 - 125 pg/mL   PERIPHERAL SMEAR REVIEW   Result Value Ref Range    Peripheral Smear Review see below    PLATELET ESTIMATE   Result Value Ref Range    Plt Estimation Normal    MORPHOLOGY   Result Value Ref Range    RBC Morphology Present    DIFFERENTIAL COMMENT   Result Value Ref Range    Comments-Diff see below    ESTIMATED GFR   Result Value Ref Range    GFR (CKD-EPI) 80 >60 mL/min/1.73 m 2   ARTERIAL BLOOD GAS   Result Value Ref Range    Ph 7.32 (L) 7.40 - 7.50    Pco2 53.0 (HH) 26.0 - 37.0 mmHg    Po2 79.3 64.0 - 87.0 mmHg    O2 Saturation 94.3 93.0 - 99.0 %    Hco3 27 (H) 17 - 25 mmol/L    Base Excess 0 -4 - 3 mmol/L    Body Temp 36.9 Centigrade   DIAGNOSTIC ALCOHOL   Result Value Ref Range    Diagnostic Alcohol <10.1 <10.1 mg/dL   TROPONIN   Result Value Ref Range    Troponin T 23 (H) 6 - 19 ng/L   CBC with Differential   Result Value Ref Range    WBC 12.3 (H) 4.8 - 10.8 K/uL    RBC 4.61 4.20 - 5.40 M/uL    Hemoglobin 12.2 12.0 - 16.0 g/dL    Hematocrit 41.1 37.0 - 47.0 %    MCV 89.2 81.4 - 97.8 fL    MCH 26.5 (L) 27.0 - 33.0 pg    MCHC 29.7 (L) 33.6 - 35.0 g/dL    RDW 58.5 (H) 35.9 - 50.0 fL    Platelet Count 194 164 - 446 K/uL    MPV 10.6 9.0 - 12.9 fL    Neutrophils-Polys 96.70 (H) 44.00 - 72.00 %    Lymphocytes 2.00 (L) 22.00 - 41.00 %    Monocytes 0.50 0.00 - 13.40 %    Eosinophils 0.00 0.00 - 6.90 %    Basophils 0.20 0.00 - 1.80 %    Immature Granulocytes 0.60 0.00 - 0.90 %    Nucleated RBC 0.00 /100 WBC    Neutrophils (Absolute) 11.89 (H) 2.00 - 7.15 K/uL     Lymphs (Absolute) 0.25 (L) 1.00 - 4.80 K/uL    Monos (Absolute) 0.06 0.00 - 0.85 K/uL    Eos (Absolute) 0.00 0.00 - 0.51 K/uL    Baso (Absolute) 0.02 0.00 - 0.12 K/uL    Immature Granulocytes (abs) 0.07 0.00 - 0.11 K/uL    NRBC (Absolute) 0.00 K/uL   Comp Metabolic Panel (CMP)   Result Value Ref Range    Sodium 140 135 - 145 mmol/L    Potassium 5.0 3.6 - 5.5 mmol/L    Chloride 104 96 - 112 mmol/L    Co2 24 20 - 33 mmol/L    Anion Gap 12.0 7.0 - 16.0    Glucose 251 (H) 65 - 99 mg/dL    Bun 19 8 - 22 mg/dL    Creatinine 1.00 0.50 - 1.40 mg/dL    Calcium 9.4 8.5 - 10.5 mg/dL    AST(SGOT) 13 12 - 45 U/L    ALT(SGPT) 24 2 - 50 U/L    Alkaline Phosphatase 99 30 - 99 U/L    Total Bilirubin 0.8 0.1 - 1.5 mg/dL    Albumin 3.8 3.2 - 4.9 g/dL    Total Protein 6.6 6.0 - 8.2 g/dL    Globulin 2.8 1.9 - 3.5 g/dL    A-G Ratio 1.4 g/dL   D-DIMER   Result Value Ref Range    D-Dimer Screen <0.27 0.00 - 0.50 ug/mL (FEU)   PROCALCITONIN   Result Value Ref Range    Procalcitonin 0.07 <0.25 ng/mL   ESTIMATED GFR   Result Value Ref Range    GFR (CKD-EPI) 69 >60 mL/min/1.73 m 2     The EKG was reviewed by me and interpreted as documented above      Medications   ipratropium-albuterol (DUONEB) nebulizer solution (0 mL Nebulization Held 11/6/22 2975)   cefTRIAXone (Rocephin) 2,000 mg in  mL IVPB (has no administration in time range)   azithromycin (ZITHROMAX) tablet 500 mg (has no administration in time range)   enoxaparin (Lovenox) inj 40 mg (has no administration in time range)   lactated ringers infusion (has no administration in time range)   budesonide-formoterol (SYMBICORT) 80-4.5 MCG/ACT inhaler 2 Puff (has no administration in time range)   ipratropium-albuterol (DUONEB) nebulizer solution (3 mL Nebulization Given 11/5/22 2309)   methylPREDNISolone sod succ (SOLU-MEDROL) 125 MG injection 125 mg (125 mg Intravenous Given 11/5/22 2314)   azithromycin (ZITHROMAX) tablet 500 mg (500 mg Oral Given 11/5/22 2314)        Measures:  HTN/IDDM FOLLOW UP:  The patient is referred to a primary physician for blood pressure management, diabetic screening, and for all other preventive health concerns    COURSE & MEDICAL DECISION MAKING  This is a 47-year-old with COPD on 2 to 3 L of baseline home oxygen who presents with shortness of breath and cough.  On arrival she is mildly tachypneic but is satting well on her baseline oxygen with  mildly labored breathing.  She does have wheezing bilaterally on exam.  Differential includes ACS, PE, COPD exacerbation, pneumonia.  Chest x-ray reviewed without focal opacity though she does have possible edema versus multifocal pneumonia.  She does have a mild leukocytosis of 12,000, her BNP is slightly elevated and her troponin is elevated at 23.  I doubt ACS and more likely this is demand in the setting of COPD exacerbation.  I considered PE but unlikely with a negative D-dimer.  COVID swab obtained.   ACS unlikely without chest pain, nonischemic EKG and negative troponin.  Considered PE however no significant risk factors and COPD more likely explanation for shortness of breath.    She was treated with methylprednisolone, duo nebs and azithromycin for COPD exacerbation.    Unfortunately despite treatment as above she continues to be tachypneic with significant wheeze and persistent symptoms.  Fortunately she is not requiring any increased oxygen from baseline and does not need escalation of respiratory support at this time.  She will be admitted to the medical team for ongoing management.      FINAL IMPRESSION  1. Acute exacerbation of chronic obstructive pulmonary disease (COPD) (HCC) Acute       2. Troponin level elevated Acute       3. Shortness of breath Acute               This dictation was created using voice recognition software. The accuracy of the dictation is limited to the abilities of the software.  The nursing notes were reviewed and certain aspects of this information were  incorporated into this note.      Electronically signed by: Cecelia Holguin M.D., 11/5/2022 10:37 PM

## 2022-11-06 NOTE — ASSESSMENT & PLAN NOTE
"Body mass index is 50.18 kg/m².  Needs weight loss and outpatient polysomnogram  Last 10/22 echo: Unable to estimate right ventricular systolic pressure   due to an inadequate tricuspid regurgitant jet.  2/2022 Echo: RVSP:35mmHg  Dr Gallego's Pulmonary note from 10/23/22 : \"KATHIA dx in 2018 /hr and was previously on  AVAPs titration with final pressure EPAP: 11 cm of water, IPAP: 24/15 cm of water, volume: 450 mL, rate of 14, and 3 L oxygen bleed in. She is an exsmoker but she does not have evidence of COPD\"    PLAN  -CPAP ordered  -symbicort  -duonebs prn  "

## 2022-11-07 PROBLEM — I87.2 STASIS DERMATITIS OF BOTH LEGS: Status: ACTIVE | Noted: 2022-11-07

## 2022-11-07 LAB
ANION GAP SERPL CALC-SCNC: 9 MMOL/L (ref 7–16)
BUN SERPL-MCNC: 22 MG/DL (ref 8–22)
CALCIUM SERPL-MCNC: 9.6 MG/DL (ref 8.5–10.5)
CHLORIDE SERPL-SCNC: 102 MMOL/L (ref 96–112)
CO2 SERPL-SCNC: 31 MMOL/L (ref 20–33)
CREAT SERPL-MCNC: 0.94 MG/DL (ref 0.5–1.4)
ERYTHROCYTE [DISTWIDTH] IN BLOOD BY AUTOMATED COUNT: 58.6 FL (ref 35.9–50)
GFR SERPLBLD CREATININE-BSD FMLA CKD-EPI: 74 ML/MIN/1.73 M 2
GLUCOSE SERPL-MCNC: 124 MG/DL (ref 65–99)
HCT VFR BLD AUTO: 39.6 % (ref 37–47)
HGB BLD-MCNC: 11.7 G/DL (ref 12–16)
MCH RBC QN AUTO: 26.3 PG (ref 27–33)
MCHC RBC AUTO-ENTMCNC: 29.5 G/DL (ref 33.6–35)
MCV RBC AUTO: 89 FL (ref 81.4–97.8)
PLATELET # BLD AUTO: 216 K/UL (ref 164–446)
PMV BLD AUTO: 11.4 FL (ref 9–12.9)
POTASSIUM SERPL-SCNC: 4.3 MMOL/L (ref 3.6–5.5)
RBC # BLD AUTO: 4.45 M/UL (ref 4.2–5.4)
SODIUM SERPL-SCNC: 142 MMOL/L (ref 135–145)
WBC # BLD AUTO: 10 K/UL (ref 4.8–10.8)

## 2022-11-07 PROCEDURE — 92612 ENDOSCOPY SWALLOW (FEES) VID: CPT

## 2022-11-07 PROCEDURE — A9270 NON-COVERED ITEM OR SERVICE: HCPCS | Performed by: STUDENT IN AN ORGANIZED HEALTH CARE EDUCATION/TRAINING PROGRAM

## 2022-11-07 PROCEDURE — 85027 COMPLETE CBC AUTOMATED: CPT

## 2022-11-07 PROCEDURE — 36415 COLL VENOUS BLD VENIPUNCTURE: CPT

## 2022-11-07 PROCEDURE — 97161 PT EVAL LOW COMPLEX 20 MIN: CPT

## 2022-11-07 PROCEDURE — 94760 N-INVAS EAR/PLS OXIMETRY 1: CPT

## 2022-11-07 PROCEDURE — 700101 HCHG RX REV CODE 250: Performed by: STUDENT IN AN ORGANIZED HEALTH CARE EDUCATION/TRAINING PROGRAM

## 2022-11-07 PROCEDURE — 700102 HCHG RX REV CODE 250 W/ 637 OVERRIDE(OP): Performed by: STUDENT IN AN ORGANIZED HEALTH CARE EDUCATION/TRAINING PROGRAM

## 2022-11-07 PROCEDURE — 700102 HCHG RX REV CODE 250 W/ 637 OVERRIDE(OP)

## 2022-11-07 PROCEDURE — 700111 HCHG RX REV CODE 636 W/ 250 OVERRIDE (IP): Performed by: STUDENT IN AN ORGANIZED HEALTH CARE EDUCATION/TRAINING PROGRAM

## 2022-11-07 PROCEDURE — 92526 ORAL FUNCTION THERAPY: CPT

## 2022-11-07 PROCEDURE — 80048 BASIC METABOLIC PNL TOTAL CA: CPT

## 2022-11-07 PROCEDURE — 700111 HCHG RX REV CODE 636 W/ 250 OVERRIDE (IP): Performed by: HOSPITALIST

## 2022-11-07 PROCEDURE — 99233 SBSQ HOSP IP/OBS HIGH 50: CPT | Mod: GC | Performed by: INTERNAL MEDICINE

## 2022-11-07 PROCEDURE — A9270 NON-COVERED ITEM OR SERVICE: HCPCS

## 2022-11-07 PROCEDURE — 93005 ELECTROCARDIOGRAM TRACING: CPT | Performed by: STUDENT IN AN ORGANIZED HEALTH CARE EDUCATION/TRAINING PROGRAM

## 2022-11-07 PROCEDURE — 94640 AIRWAY INHALATION TREATMENT: CPT

## 2022-11-07 PROCEDURE — 770020 HCHG ROOM/CARE - TELE (206)

## 2022-11-07 PROCEDURE — 94660 CPAP INITIATION&MGMT: CPT

## 2022-11-07 RX ORDER — TRAZODONE HYDROCHLORIDE 100 MG/1
50 TABLET ORAL
Status: DISCONTINUED | OUTPATIENT
Start: 2022-11-07 | End: 2022-11-09 | Stop reason: HOSPADM

## 2022-11-07 RX ORDER — MONTELUKAST SODIUM 10 MG/1
10 TABLET ORAL EVERY EVENING
Status: DISCONTINUED | OUTPATIENT
Start: 2022-11-07 | End: 2022-11-09 | Stop reason: HOSPADM

## 2022-11-07 RX ORDER — ATORVASTATIN CALCIUM 20 MG/1
20 TABLET, FILM COATED ORAL
Status: DISCONTINUED | OUTPATIENT
Start: 2022-11-07 | End: 2022-11-09 | Stop reason: HOSPADM

## 2022-11-07 RX ORDER — ARIPIPRAZOLE 10 MG/1
30 TABLET ORAL EVERY MORNING
Status: DISCONTINUED | OUTPATIENT
Start: 2022-11-07 | End: 2022-11-09 | Stop reason: HOSPADM

## 2022-11-07 RX ORDER — FAMOTIDINE 20 MG/1
20 TABLET, FILM COATED ORAL 2 TIMES DAILY
Status: DISCONTINUED | OUTPATIENT
Start: 2022-11-07 | End: 2022-11-09 | Stop reason: HOSPADM

## 2022-11-07 RX ADMIN — IPRATROPIUM BROMIDE AND ALBUTEROL SULFATE 3 ML: .5; 2.5 SOLUTION RESPIRATORY (INHALATION) at 10:53

## 2022-11-07 RX ADMIN — ENOXAPARIN SODIUM 60 MG: 60 INJECTION SUBCUTANEOUS at 06:36

## 2022-11-07 RX ADMIN — ATORVASTATIN CALCIUM 20 MG: 20 TABLET, FILM COATED ORAL at 20:06

## 2022-11-07 RX ADMIN — TRAZODONE HYDROCHLORIDE 50 MG: 100 TABLET ORAL at 20:06

## 2022-11-07 RX ADMIN — BUDESONIDE AND FORMOTEROL FUMARATE DIHYDRATE 2 PUFF: 80; 4.5 AEROSOL RESPIRATORY (INHALATION) at 19:18

## 2022-11-07 RX ADMIN — FUROSEMIDE 20 MG: 10 INJECTION, SOLUTION INTRAMUSCULAR; INTRAVENOUS at 06:36

## 2022-11-07 RX ADMIN — MONTELUKAST 10 MG: 10 TABLET, FILM COATED ORAL at 17:22

## 2022-11-07 RX ADMIN — FUROSEMIDE 20 MG: 10 INJECTION, SOLUTION INTRAMUSCULAR; INTRAVENOUS at 16:11

## 2022-11-07 RX ADMIN — BUDESONIDE AND FORMOTEROL FUMARATE DIHYDRATE 2 PUFF: 80; 4.5 AEROSOL RESPIRATORY (INHALATION) at 07:03

## 2022-11-07 RX ADMIN — IPRATROPIUM BROMIDE AND ALBUTEROL SULFATE 3 ML: .5; 2.5 SOLUTION RESPIRATORY (INHALATION) at 19:18

## 2022-11-07 RX ADMIN — ENOXAPARIN SODIUM 60 MG: 60 INJECTION SUBCUTANEOUS at 16:11

## 2022-11-07 RX ADMIN — IPRATROPIUM BROMIDE AND ALBUTEROL SULFATE 3 ML: .5; 2.5 SOLUTION RESPIRATORY (INHALATION) at 03:27

## 2022-11-07 RX ADMIN — ARIPIPRAZOLE 30 MG: 10 TABLET ORAL at 17:22

## 2022-11-07 RX ADMIN — IPRATROPIUM BROMIDE AND ALBUTEROL SULFATE 3 ML: .5; 2.5 SOLUTION RESPIRATORY (INHALATION) at 14:58

## 2022-11-07 RX ADMIN — FAMOTIDINE 20 MG: 20 TABLET, FILM COATED ORAL at 17:22

## 2022-11-07 RX ADMIN — IPRATROPIUM BROMIDE AND ALBUTEROL SULFATE 3 ML: .5; 2.5 SOLUTION RESPIRATORY (INHALATION) at 07:02

## 2022-11-07 RX ADMIN — IPRATROPIUM BROMIDE AND ALBUTEROL SULFATE 3 ML: .5; 2.5 SOLUTION RESPIRATORY (INHALATION) at 23:20

## 2022-11-07 ASSESSMENT — GAIT ASSESSMENTS
ASSISTIVE DEVICE: 4 WHEEL WALKER
GAIT LEVEL OF ASSIST: SUPERVISED
DISTANCE (FEET): 100

## 2022-11-07 ASSESSMENT — ENCOUNTER SYMPTOMS
WHEEZING: 1
SPUTUM PRODUCTION: 1
CONSTITUTIONAL NEGATIVE: 1
GASTROINTESTINAL NEGATIVE: 1
COUGH: 1
PSYCHIATRIC NEGATIVE: 1
HEMOPTYSIS: 0
NEUROLOGICAL NEGATIVE: 1
SHORTNESS OF BREATH: 1
EYES NEGATIVE: 1
MUSCULOSKELETAL NEGATIVE: 1

## 2022-11-07 ASSESSMENT — PAIN DESCRIPTION - PAIN TYPE: TYPE: CHRONIC PAIN

## 2022-11-07 NOTE — DIETARY
NUTRITION SERVICES: BMI - Pt with BMI >40 (=Body mass index is 50.18 kg/m².), Class III obesity. Weight loss counseling not appropriate in acute care setting. RECOMMEND - If appropriate at DC please refer to outpatient nutrition services for weight management.

## 2022-11-07 NOTE — PROGRESS NOTES
Hospital Medicine Daily Progress Note    Note signed  11/7/2022    [facility] Team: [facility]   Attending: Randolph Gutierrez M.d.  Senior Resident: Dr. Monteiro  Intern:  Dr. Haines  Contact Number: [phone]    Chief Complaint  Cough and sob    Hospital Course  Lorene Haro is a morbidly obese 47 y.o. female with a past medical history of obstructive sleep apnea, asthma, schizophrenia, chronic obesity hypoventilation syndrome, tobacco use admitted 11/5/2022 with cough and shortness of breath.  She was seen initially by paramedics and was not wearing her nasal cannula.  There is concern of wheezing and coughing.  Per report she had been having productive yellow cough and yellow nasal sputum.    Of note the patient was recently admitted from 10/22 to 10/26/2022.  Upon that time pulmonary did consult.  There is concern of asthma and the patient was on a steroid taper.    Interval Problem Update  11/6: Patient was admitted this morning.  I have reviewed her chest x-ray appears to be increase opacification compared to prior.  Questionable left lower costophrenic blunting per my read.  Patient is somnolent and on supplemental oxygen currently.  She has some lower extremity edema I will initiate Lasix stop antibiotics due to normal procalcitonin level.  I have reviewed prior pulmonary note from 10/23 Dr. Gallego and mentioned KATHIA and I will recommend starting CPAP with Dr. Gallego's mentioned settings and respiratory protocol.    11/7:  no acute overnight events. Patient on oxymask on 3L this morning. Had 5 beats of vtach with PT. Patient states she has no CPAP at home. Patient alert and oriented x 4. Patient failed speech eval, pending further evaluation with FEES.    I have discussed this patient's plan of care and discharge plan at IDT rounds today with Case Management, Nursing, Nursing leadership, and other members of the IDT team.      Code Status  Full Code    Disposition  Patient is not medically cleared for  discharge.   Anticipate discharge to  be determined .  I have placed the appropriate orders for post-discharge needs.    Review of Systems  Review of Systems   Constitutional: Negative.    HENT: Negative.     Eyes: Negative.    Respiratory:  Positive for cough, sputum production, shortness of breath and wheezing. Negative for hemoptysis.    Cardiovascular:  Positive for leg swelling.   Gastrointestinal: Negative.    Genitourinary: Negative.    Musculoskeletal: Negative.    Skin: Negative.    Neurological: Negative.    Endo/Heme/Allergies: Negative.    Psychiatric/Behavioral: Negative.        Physical Exam  Temp:  [36.1 °C (97 °F)-37.2 °C (99 °F)] 36.2 °C (97.2 °F)  Pulse:  [65-91] 80  Resp:  [18-22] 18  BP: (105-121)/(50-64) 119/62  SpO2:  [87 %-99 %] 96 %    Physical Exam  Constitutional:       Appearance: Normal appearance. She is obese.   HENT:      Head: Normocephalic and atraumatic.      Nose: Nose normal.      Mouth/Throat:      Mouth: Mucous membranes are moist.      Pharynx: Oropharynx is clear.   Eyes:      Extraocular Movements: Extraocular movements intact.      Conjunctiva/sclera: Conjunctivae normal.      Pupils: Pupils are equal, round, and reactive to light.   Cardiovascular:      Rate and Rhythm: Normal rate.   Pulmonary:      Effort: Pulmonary effort is normal.      Breath sounds: Normal breath sounds.   Abdominal:      General: Abdomen is flat. Bowel sounds are normal.      Palpations: Abdomen is soft.   Musculoskeletal:      Cervical back: Normal range of motion.      Right lower leg: Edema present.      Left lower leg: Edema present.   Skin:     General: Skin is warm.      Capillary Refill: Capillary refill takes less than 2 seconds.   Neurological:      General: No focal deficit present.      Mental Status: She is alert.   Psychiatric:         Mood and Affect: Mood normal.         Behavior: Behavior normal.         Thought Content: Thought content normal.         Judgment: Judgment normal.        Fluids    Intake/Output Summary (Last 24 hours) at 11/7/2022 1547  Last data filed at 11/7/2022 0600  Gross per 24 hour   Intake 610 ml   Output --   Net 610 ml       Laboratory  Recent Labs     11/05/22  2241 11/06/22  0419 11/07/22  0058   WBC 12.3* 12.3* 10.0   RBC 4.68 4.61 4.45   HEMOGLOBIN 12.3 12.2 11.7*   HEMATOCRIT 41.1 41.1 39.6   MCV 87.8 89.2 89.0   MCH 26.3* 26.5* 26.3*   MCHC 29.9* 29.7* 29.5*   RDW 57.4* 58.5* 58.6*   PLATELETCT 210 194 216   MPV 10.7 10.6 11.4       Imaging  DX-CHEST-PORTABLE (1 VIEW)   Final Result      1.  Mild interstitial edema.. Multifocal pneumonia could have a similar appearance.   2.  Airspace disease at the peripheral left base.           Assessment/Plan  * Shortness of breath + Somnolence- (present on admission)  Assessment & Plan  Hypercapnia - CO2 currently 31  Suspect KATHIA w/ hypercapnia  11/6 pCO2: 53  Does not have a CPAP at home.    PLAN  -CPAP ordered  -patient alert/oriented x 4   -pending FEES w/ speech  -symbicort  -duonebs prn    Morbid obesity with alveolar hypoventilation (HCC)- (present on admission)  Assessment & Plan  Body mass index is 50.18 kg/m².  Needs weight loss and outpatient polysomnogram  Last 10/22 echo: Unable to estimate right ventricular systolic pressure   due to an inadequate tricuspid regurgitant jet.  2/2022 Echo: RVSP:35mmHg  Dr Gallego's Pulmonary note from 10/23/22 :  KATHIA dx in 2018 /hr and was previously on  AVAPs titration with final pressure EPAP: 11 cm of water, IPAP: 24/15 cm of water, volume: 450 mL, rate of 14, and 3 L oxygen bleed in  She is an exsmoker but she does not have evidence of COPD    PLAN  -CPAP ordered  -symbicort  -duonebs prn    Stasis dermatitis of both legs  Assessment & Plan  -lasix 20 BID  -compression socks ordered    Hyperglycemia  Assessment & Plan  9/2022 A1c:5.7  Monitor glucose  Needs weight loss  Pre diabetic    Elevated troponin  Assessment & Plan  Hypoxia? Demand ischemia likely  Trending down  on follow up.    Insomnia  Assessment & Plan  Per hx.    Slept ok last night    ?GERD  Assessment & Plan  Was on Pepcid 20 mg twice daily prior to admission    HLD   Assessment & Plan  Active management with Lipitor 20 mg at bedtime    Tobacco use- (present on admission)  Assessment & Plan  Encourage cessation       VTE prophylaxis: enoxaparin ppx    I have performed a physical exam and reviewed and updated ROS and Plan today (11/7/2022). In review of yesterday's note (11/6/2022), there are no changes except as documented above.

## 2022-11-07 NOTE — CARE PLAN
Received bedside shift report from JC Medeiros and assumed care. Patient assisted to sitting position in bed, refused ambulation to chair despite education. Eventually got up SBA with FWW with PT x1 and then with tech x1.     FEES performed per Speech Therapy - now on minced and moist diet with mildly thickened liquids. Must be up in chair with all meals - aspirates. Medications crushed in apple sauce.     Remained A+Ox4, no signs of acute distress without complaints of pain. Remains on 3L Oxymask (Home O2 - 3L NC). CPAP used at night.     Safety: Bed locked and low, alarm on (chair alarm in use when patient is up to chair). Demonstrates ability to use call light and verbalizes understanding of hospital safety protocols. Non-slip socks in place. Belongings/bedside table/call light within reach. Room well lit.     Skin: Remains intact. Patient able to reposition self. Verbalizes understanding of importance of relieving bony prominences of pressure regularly with risk of skin breakdown.           The patient is Stable - Low risk of patient condition declining or worsening    Shift Goals  Clinical Goals: Respiratory Management / Rest / PO Abx  Patient Goals: Rest / Discharge  Family Goals: Safety/Comfort    Progress made toward(s) clinical / shift goals:        Problem: Knowledge Deficit - Standard  Goal: Patient and family/care givers will demonstrate understanding of plan of care, disease process/condition, diagnostic tests and medications  Outcome: Progressing     Problem: Fall Risk  Goal: Patient will remain free from falls  Outcome: Progressing     Problem: Discharge Barriers/Planning  Goal: Patient's continuum of care needs are met  Outcome: Progressing     Problem: Respiratory  Goal: Patient will achieve/maintain optimum respiratory ventilation and gas exchange  Outcome: Progressing     Problem: Pain - Standard  Goal: Alleviation of pain or a reduction in pain to the patient’s comfort goal  Outcome: Progressing        Patient is not progressing towards the following goals:

## 2022-11-07 NOTE — PROGRESS NOTES
Hospital Medicine Daily Progress Note    Date of Service  11/6/22    Note signed  11/6/2022    Chief Complaint  Cough and sob      Hospital Course  Lorene Haro is a morbidly obese 47 y.o. female with a past medical history of obstructive sleep apnea, asthma, schizophrenia, chronic obesity hypoventilation syndrome, tobacco use admitted 11/5/2022 with cough and shortness of breath.  She was seen initially by paramedics and was not wearing her nasal cannula.  There is concern of wheezing and coughing.  Per report she had been having productive yellow cough and yellow nasal sputum.    Of note the patient was recently admitted from 10/22 to 10/26/2022.  Upon that time pulmonary did consult.  There is concern of asthma and the patient was on a steroid taper.        Interval Problem Update  11/6: Patient was admitted this morning.  I have reviewed her chest x-ray appears to be increase opacification compared to prior.  Questionable left lower costophrenic blunting per my read.  Patient is somnolent and on supplemental oxygen currently.  She has some lower extremity edema I will initiate Lasix stop antibiotics due to normal procalcitonin level.  I have reviewed prior pulmonary note from 10/23 Dr. Gallego and mentioned KATHIA and I will recommend starting CPAP with Dr. Gallego's mentioned settings and respiratory protocol.    I have discussed this patient's plan of care and discharge plan at IDT rounds today with Case Management, Nursing, Nursing leadership, and other members of the IDT team.      Code Status  Full Code    Disposition  Patient is not medically cleared for discharge.   Anticipate discharge to  be determined .  I have placed the appropriate orders for post-discharge needs.    Review of Systems  ROS     Physical Exam  Temp:  [36.3 °C (97.4 °F)-36.8 °C (98.2 °F)] 36.4 °C (97.5 °F)  Pulse:  [] 85  Resp:  [18-24] 18  BP: ()/() 105/64  SpO2:  [92 %-99 %] 99 %    Physical Exam    Fluids  No intake  or output data in the 24 hours ending 11/06/22 1620    Laboratory  Recent Labs     11/05/22 2241 11/06/22 0419   WBC 12.3* 12.3*   RBC 4.68 4.61   HEMOGLOBIN 12.3 12.2   HEMATOCRIT 41.1 41.1   MCV 87.8 89.2   MCH 26.3* 26.5*   MCHC 29.9* 29.7*   RDW 57.4* 58.5*   PLATELETCT 210 194   MPV 10.7 10.6     Recent Labs     11/05/22 2241 11/06/22  0419   SODIUM 143 140   POTASSIUM 4.3 5.0   CHLORIDE 106 104   CO2 27 24   GLUCOSE 161* 251*   BUN 16 19   CREATININE 0.88 1.00   CALCIUM 9.5 9.4                     Imaging  DX-CHEST-PORTABLE (1 VIEW)   Final Result      1.  Mild interstitial edema.. Multifocal pneumonia could have a similar appearance.   2.  Airspace disease at the peripheral left base.           Assessment/Plan  * Shortness of breath + Somnolence- (present on admission)  Assessment & Plan  C/o hypercapnia.  Consult pulmonary for consideration of CPAP  Suspect KATHIA w/ hypercapnia  11/6 pCO2: 53  Does not have a CPAP at home.    Elevated troponin  Assessment & Plan  Hypoxia? Demand ischemia likely  Trending down on follow up.    Hyperglycemia  Assessment & Plan  9/2022 A1c:5.7  Monitor glucose  Needs weight loss  Pre diabetic    Insomnia  Assessment & Plan  Per hx.    Sleep per RN.    ?GERD  Assessment & Plan  Was on Pepcid 20 mg twice daily prior to admission    HLD   Assessment & Plan  Active management with Lipitor 20 mg at bedtime    Tobacco use- (present on admission)  Assessment & Plan  Encourage cessation    Morbid obesity with alveolar hypoventilation (HCC)- (present on admission)  Assessment & Plan  Body mass index is 50.18 kg/m².  Needs weight loss and outpatient polysomnogram  Last 10/22 echo: Unable to estimate right ventricular systolic pressure   due to an inadequate tricuspid regurgitant jet.  2/2022 Echo: RVSP:35mmHg  Dr Gallego's Pulmonary note from 10/23/22 :  KATHIA dx in 2018 /hr and was previously on  AVAPs titration with final pressure EPAP: 11 cm of water, IPAP: 24/15 cm of water,  volume: 450 mL, rate of 14, and 3 L oxygen bleed in  She is an exsmoker but she does not have evidence of COPD  \       VTE prophylaxis: enoxaparin ppx    I have performed a physical exam and reviewed and updated ROS and Plan today (11/6/2022). In review of yesterday's note (11/5/2022), there are no changes except as documented above.

## 2022-11-07 NOTE — CARE PLAN
Problem: Bronchoconstriction  Goal: Improve in air movement and diminished wheezing  Description: Target End Date:  2 to 3 days    1.  Implement inhaled treatments  2.  Evaluate and manage medication effects  Outcome: Progressing       Respiratory Update  Q4 Duoneb, Bid Symbicort.  3L Nc    Pt tolerating current treatments well with no adverse reactions.

## 2022-11-07 NOTE — THERAPY
Physical Therapy   Initial Evaluation     Patient Name: Lorene Haro  Age:  47 y.o., Sex:  female  Medical Record #: [id number]  Today's Date: 11/7/2022     Precautions: Fall Risk;Swallow Precautions    Assessment  Patient is a 47 y.o. female admitted with c/o SOB, pt was admitted last month with COPD exacerbation. Pt seen for PT evaluation at this time. Pt appears at her functional baseline and appears capable of return home from PT standpoint. Pt reports no concerns with mobility, completed functional tasks without assist and ambulated with 4WW, no LOB, one seated rest break d/t fatigue. Discussed activity pacing and use of 4WW with return home. Patient will not be actively followed for physical therapy services at this time, however may be seen if requested by physician for 1 more visit within 30 days to address any discharge or equipment needs.    Plan  Recommend Physical Therapy for Evaluation only   DC Equipment Recommendations: None  Discharge Recommendations: Anticipate that the patient will have no further physical therapy needs after discharge from the hospital     11/07/22 1049   Prior Living Situation   Prior Services None   Housing / Facility 1 Story Apartment / Condo   Steps Into Home 0   Steps In Home 0   Bathroom Set up Shower Chair   Equipment Owned 4-Wheel Walker   Lives with -  Alone and Able to Care For Self   Comments reports is moving into a  soon   Prior Level of Functional Mobility   Bed Mobility Independent   Transfer Status Independent   Ambulation Independent   Distance Ambulation (Feet) limited community   Assistive Devices Used 4-Wheel Walker   Cognition    Level of Consciousness Alert   Comments pleasant and very eager to mobilize   Balance Assessment   Sitting Balance (Static) Good   Sitting Balance (Dynamic) Good   Standing Balance (Static) Fair +   Standing Balance (Dynamic) Fair +   Weight Shift Sitting Good   Weight Shift Standing Good   Comments standing with 4WW   Gait  Analysis   Gait Level Of Assist Supervised   Assistive Device 4 Wheel Walker   Distance (Feet) 100   # of Times Distance Traveled 2   Weight Bearing Status no restrictions   Comments seated rest, no LOB   Bed Mobility    Supine to Sit Supervised   Scooting Supervised   Functional Mobility   Sit to Stand Supervised   Bed, Chair, Wheelchair Transfer Supervised

## 2022-11-07 NOTE — THERAPY
Speech Language Pathology   Fiberoptic Endoscopic Evaluation of Swallow     Patient Name: Lorene Haro  AGE:  47 y.o., SEX:  female  Medical Record #: [id number]  Today's Date: 11/7/2022     Precautions  Precautions: Fall Risk, Swallow Precautions ( See Comments)  Comments: Swallow precautions    HPI: Per SLP notes from 11/6:  Pt is 47 y.o. female admitted 11/5 with hx of KATHIA and OHS (on 3L at baseline) who presented with SOB. Per notes, pt has been somnolent and not responding to questions however at time of this SLPs evaluation, pt able to participate in conversation.      PMHx: COVID (2021), ?Asthma, HLD, HTN, ?Schizophrenia/Bipolar 2, ?A-fib, ?Hysterectomy, ?Colectomy, ?cholecystectomy, ?L Knee Arthroscopy, and ?h/o smoking (0.25 ppd).     Chest x-ray on 11/5 reports mild interstitial edema, multifocal pneumonia could have similar appearance.     Current Method of Nutrition   NPO until cleared by speech pathology      Pertinent Information:  Affect/Behavior: Appropriate  Oxygen Requirements:  3 L Nasal Cannula  Feeding Tube: None  Dentition: Poor  Factor(s) Affecting Performance: Impaired endurance    Discussed the risks, benefits, and alternatives of the FEES procedure. Patient/family acknowledged and agreed to proceed.     Assessment  Flexible Endoscopic Evaluation of Swallowing (FEES) completed at bedside today. The endoscope was passed transnasally via right nare to evaluate the anatomy and physiology of swallowing. Pt tolerated the procedure with no apparent distress.    Anatomic Findings: shallow lateral channels with lateral walls encroaching into space  Vocal Fold Motion: Bilateral movement  Secretion Management: WNL  PO trials: ice chips, thin liquids, mildly thick liquids, liquidized, puree, soft & bite sized, regular solids      Consistency PAS Score Timing Comments   Ice Chips 1 N/A    Thin Liquid 8 Pre swallow and During swallow PAS 7 for all teaspoon trials: PAS 7 or 8 on all cup sips   Mildly  Thick Liquid 2 During swallow (inferred) PAS 2 x1 with large cup sip and x1 with straw sip; otherwise, PAS 1 for all other swallows   Liquidized 2 During swallow (inferred) PAS 2 on one swallow; all other swallows :PAS 1   Puree 2 Pre swallow PAS 2 on one swallow; all other swallows PAS 1, but did have mild pharyngeal residue in valleculae and on posterior pharyngeal wall in 2 other trials   Soft & Bite Sized 3 During swallow PAS 3: peach at the laryngeal vestibule--not sensed by patient, and once cued to cough, she ejected from glottis, but it remained in valleculae for 2 subsequent swallows before liquid wash   Regular 1 N/A    Mixed Consistency 2 Pre swallow and During swallow      Penetration-Aspiration Scale (PAS)  1     No contrast enters airway  2     Contrast enters the airway, remains above the vocal folds, and is ejected from the airway (not seen in the airway at the end of the swallow).  3     Contrast enters the airway, remains above the vocal folds, and is not ejected from the airway (is seen in the airway after the swallow).  4     Contrast enters the airway, contacts the vocal folds, and is ejected from the airway.  5     Contrast enters the airway, contacts the vocal folds, and is not ejected from the airway  6     Contrast enters the airway, crosses the plane of the vocal folds, and is ejected from the airway.  7     Contrast enters the airway, crosses the plane of the vocal folds, and is not ejected from the airway despite effort.  8     Contrast enters the airway, crosses the plane of the vocal folds, is not ejected from the airway and there is no response to aspiration.    Oral phase:  Patient is presenting with decreased lingual control with spillage to the level of the valleculae and pyriform sinuses with inconsistent delay in onset of swallow.      Pharyngeal phase:  Patient is presenting with impaired base of tongue retraction, decreased laryngeal elevation, impaired epiglottic deflection and  laryngeal mistiming.  She had mild vallecular residue on coughed up peach and diffuse pharyngeal residue inconsistently on pudding and thin liquids.  She was noted to have inconsistent penetration on soft solids and purees with consistent laryngeal penetration and trace aspiration before and during the swallow on thin liquids despite implementation of the following strategies:  3 second oral prep, effortful swallow or chin tuck maneuvers.  She did not sense aspiration or penetration and required cues to cough to clear material.        Also of note, patient had residue at the level of the UES at times and what appeared to be intermittent retrograde flow of material that came through the UES following swallows of thin liquids, pudding and soft solids.  There was no aspiration of this material, but there is risk for ascending aspiration/penetration given history of reflux.        Clinical Impressions:     The pt presents with a moderate oropharyngeal dysphagia likely acute on chronic related to pneumonia and incoordination of respiratory and swallow systems as well as history of KATHIA and OHS.  Swallow safety is impaired and efficiency is mild.  Diet modification is indicated to mitigate the risk for aspiration.  Recommend minced and moist diet with mildly thick liquids with use of an effortful swallow followed by a second swallow.  Further recommendations are listed below.   Patient would benefit from SLP intervention to address dysphagia and maximize swallow function.      Recommendations  Minced and moist diet with mildly thick liquids (MM5/MT2)  2.  Swallowing Instructions & Precautions:   Supervision: Assist with meal tray set up, Distant supervision - check on patient 2-3 times per meal--tray will be delivered to nurses station so that CNA/RN can ensure patient is out of bed and up in chair before she eats  Positioning: Meals sitting upright in a chair, as tolerated  Medication: Crush with applesauce or puree, as  appropriate  Strategies: Small bites/sips, Slow rate of intake, No straws, Multiple swallows (x 2) per bite/sip , Effortful swallow  Oral Care: Q4h  3.  Reflux precautions at all times--upright for 30 minutes following PO  4.  SLP will follow for treatment    Plan    Recommend Speech Therapy 3 times per week until therapy goals are met for the following treatments:  Dysphagia Training and Patient / Family / Caregiver Education.    Discharge Recommendations: Recommend home health for continued speech therapy services (she will need post acute SLP services---will defer to PT/OT as to what her needs are and if she needs SNF first)     Objective       11/07/22 1455   Precautions   Precautions Fall Risk;Swallow Precautions ( See Comments)   Comments Swallow precautions   Prior Living Situation   Prior Services None   Housing / Facility 1 Story Apartment / Condo   Elevator Yes   Bathroom Set up Shower Chair   Equipment Owned 4-Wheel Walker   Lives with - Patient's Self Care Capacity Alone and Able to Care For Self   Comments Pt reports she is moving to a group home soon   Dysphagia Rating   Nutritional Liquid Intake Rating Scale Thickened beverages (mildly thick unless otherwise specified)   Nutritional Food Intake Rating Scale Total oral diet with multiple consistencies but requiring special preparation or compensations   Problem List   Problem List Dysphagia   Evaluation Recommendations   Swallow Evaluations Recommendations (Yes / No) Yes   Diet / Liquid Recommendation Mildly Thick (2) - (Nectar Thick);Minced & Moist (5) - (Dysphagia II)   Medication Administration  Crush all Medications in Puree   Short Term Goals   Short Term Goal # 1 11/7 modified: Pt will be able to consume MM5/MT2 diet with min cues to use swallow precautions with no overt S/Sx of aspiration noted   Short Term Goal # 2 Pt will complete instrumental assessment of swallowing w/ SLP to assess swallow function and determine safety for PO.   Goal  "Outcome # 2  Goal met   Short Term Goal # 3 11/7: Pt will complete 10/10 reps of base of tongue retraction and laryngeal elevation exercises with min cues and \"good\" accuracy as judged by SLP.   Education Group   Education Provided Dysphagia   Dysphagia Patient Response Patient;Acceptance;Explanation;Demonstration;Verbal Demonstration;Reinforcement Needed   Role of SLP Patient Response Patient;Acceptance;Explanation;Verbal Demonstration   Anticipated Discharge Needs   Discharge Recommendations Recommend home health for continued speech therapy services  (she will need post acute SLP services---will defer to PT/OT as to what her needs are and if she needs SNF first)   Therapy Recommendations Upon DC Dysphagia Training;Patient / Family / Caregiver Education;Community Re-Integration     "

## 2022-11-07 NOTE — THERAPY
"Speech Language Pathology  Daily Treatment     Patient Name: Lorene Haro  Age:  47 y.o., Sex:  female  Medical Record #: [id number]  Today's Date: 11/7/2022     Precautions  Precautions: Fall Risk, Swallow Precautions ( See Comments)    HPI: Pt is 47 y.o. female admitted 11/5 with hx of KATHIA and OHS (on 3L at baseline) who presented with SOB. CSE 11/7.    Subjective  Pt agreeable and cooperative w/ SLP tx tasks, fair recall of results/recommendations from CSE. Stated \"So what's next, the tube in my nose?\" Pt reports that soft foods and liquids do not cause difficulty but dry, hard foods get stuck in her throat and make her cough.     Assessment  PO trials administered: ice, TN0, LQ3, RG7. Appropriate self-feeding and unremarkable oral phase throughout, mastication of RG7 deliberate but functional. Pt w/ delayed, congested cough following all PO administered excepting ice chip trials. Cough prolonged and effortful. Concerning for airway invasion. Swallow appreciated throughout, significant increases in WOB noted throughout PO intake. Pt reports that difficulty breathing/increased WOB is typical for her during PO intake. Pt w/ O2 sats at 92 given presence of Oxymask, consistent desaturation to mid-80s with brief removal of mask for bites PO. Low O2 baseline also concerning for airway invasion with swallow. Pt reports and demonstrated need for oral suctioning to remove secretions, reports that she has expectorated yellow mucous and that she suctions secretions more often that swallowing them. Reports \"dull pain\" with swallow. All concerning for dysphagia.     Clinical Impressions  Pt presents with clinical s/sx of oropharyngeal dysphagia. Pt demonstrates need for and readiness for FEES to further assess swallow function and determine safety for PO. Pt agreeable; FEES planned for 2:00pm this date. In the interim, pt demonstrates need for NPO status excepting ice chips x5-10 one a time to prevent xerostomia and " "atrophy.      Recommendations  1.  NPO pending FEES. Okay for 5-10 single ice chips following oral care  2.  Instrumentation: FEES  3.  Swallowing Instructions & Precautions:   Positioning: Fully upright and midline during oral intake  Medication: Non Oral   Oral Care: Q4h  4.  SLP to follow         Plan    Continue current treatment plan.    Discharge Recommendations: Recommend post-acute placement for additional speech therapy services prior to discharge home       Objective   11/07/22 1038   Vitals   Pulse Oximetry 91 %   O2 (LPM) 3   O2 Delivery Device Oxymask   Pain 0 - 10 Group   Therapist Pain Assessment Post Activity Pain Same as Prior to Activity;0  (No c/o pain)   Dysphagia    Diet / Liquid Recommendation NPO;Other (Comments)  (Pending results of FEES)   Patient / Family Goals   Patient / Family Goal #1 \"I've been having trouble swallowing\"   Goal #1 Outcome Progressing slower than expected   Short Term Goals   Short Term Goal # 1 Pt will consume pre-feeding trials with SLP and no overt s/sx concerning for aspiration.   Goal Outcome # 1 Progressing as expected   Short Term Goal # 2 Pt will complete instrumental assessment of swallowing w/ SLP to assess swallow function and determine safety for PO.   Goal Outcome # 2  Goal not met   Education Group   Education Provided Dysphagia;Role of Speech Therapy   Additional Comments Pt verbalized agreement to FEES, agreeable to potential outcomes of instrumental   Interdisciplinary Plan of Care Collaboration   Collaboration Comments RN, MD updated.     "

## 2022-11-07 NOTE — CARE PLAN
The patient is Stable - Low risk of patient condition declining or worsening    Shift Goals  Clinical Goals: Monitor respiratory status, rest  Patient Goals: Rest  Family Goals: ARAM      Problem: Respiratory  Goal: Patient will achieve/maintain optimum respiratory ventilation and gas exchange  Outcome: Progressing       Nocturnal CPAP/nebulizer/pulmonary hygiene

## 2022-11-08 PROBLEM — R13.12 OROPHARYNGEAL DYSPHAGIA: Status: ACTIVE | Noted: 2022-11-08

## 2022-11-08 LAB
ALBUMIN SERPL BCP-MCNC: 3.6 G/DL (ref 3.2–4.9)
ALBUMIN/GLOB SERPL: 1.2 G/DL
ALP SERPL-CCNC: 82 U/L (ref 30–99)
ALT SERPL-CCNC: 21 U/L (ref 2–50)
ANION GAP SERPL CALC-SCNC: 12 MMOL/L (ref 7–16)
AST SERPL-CCNC: 17 U/L (ref 12–45)
BASOPHILS # BLD AUTO: 0.1 % (ref 0–1.8)
BASOPHILS # BLD: 0.01 K/UL (ref 0–0.12)
BILIRUB SERPL-MCNC: 1 MG/DL (ref 0.1–1.5)
BUN SERPL-MCNC: 31 MG/DL (ref 8–22)
CALCIUM SERPL-MCNC: 9.8 MG/DL (ref 8.5–10.5)
CHLORIDE SERPL-SCNC: 102 MMOL/L (ref 96–112)
CO2 SERPL-SCNC: 30 MMOL/L (ref 20–33)
CREAT SERPL-MCNC: 1.02 MG/DL (ref 0.5–1.4)
EKG IMPRESSION: NORMAL
EOSINOPHIL # BLD AUTO: 0.01 K/UL (ref 0–0.51)
EOSINOPHIL NFR BLD: 0.1 % (ref 0–6.9)
ERYTHROCYTE [DISTWIDTH] IN BLOOD BY AUTOMATED COUNT: 59 FL (ref 35.9–50)
GFR SERPLBLD CREATININE-BSD FMLA CKD-EPI: 67 ML/MIN/1.73 M 2
GLOBULIN SER CALC-MCNC: 2.9 G/DL (ref 1.9–3.5)
GLUCOSE SERPL-MCNC: 100 MG/DL (ref 65–99)
HCT VFR BLD AUTO: 40.4 % (ref 37–47)
HGB BLD-MCNC: 12 G/DL (ref 12–16)
IMM GRANULOCYTES # BLD AUTO: 0.04 K/UL (ref 0–0.11)
IMM GRANULOCYTES NFR BLD AUTO: 0.6 % (ref 0–0.9)
LYMPHOCYTES # BLD AUTO: 1.68 K/UL (ref 1–4.8)
LYMPHOCYTES NFR BLD: 24.2 % (ref 22–41)
MAGNESIUM SERPL-MCNC: 2.2 MG/DL (ref 1.5–2.5)
MCH RBC QN AUTO: 26.4 PG (ref 27–33)
MCHC RBC AUTO-ENTMCNC: 29.7 G/DL (ref 33.6–35)
MCV RBC AUTO: 88.8 FL (ref 81.4–97.8)
MONOCYTES # BLD AUTO: 0.61 K/UL (ref 0–0.85)
MONOCYTES NFR BLD AUTO: 8.8 % (ref 0–13.4)
NEUTROPHILS # BLD AUTO: 4.58 K/UL (ref 2–7.15)
NEUTROPHILS NFR BLD: 66.2 % (ref 44–72)
NRBC # BLD AUTO: 0 K/UL
NRBC BLD-RTO: 0 /100 WBC
PLATELET # BLD AUTO: 205 K/UL (ref 164–446)
PMV BLD AUTO: 11.4 FL (ref 9–12.9)
POTASSIUM SERPL-SCNC: 4 MMOL/L (ref 3.6–5.5)
PROT SERPL-MCNC: 6.5 G/DL (ref 6–8.2)
RBC # BLD AUTO: 4.55 M/UL (ref 4.2–5.4)
SODIUM SERPL-SCNC: 144 MMOL/L (ref 135–145)
WBC # BLD AUTO: 6.9 K/UL (ref 4.8–10.8)

## 2022-11-08 PROCEDURE — 92526 ORAL FUNCTION THERAPY: CPT

## 2022-11-08 PROCEDURE — 85025 COMPLETE CBC W/AUTO DIFF WBC: CPT

## 2022-11-08 PROCEDURE — 99232 SBSQ HOSP IP/OBS MODERATE 35: CPT | Mod: GC | Performed by: INTERNAL MEDICINE

## 2022-11-08 PROCEDURE — 36415 COLL VENOUS BLD VENIPUNCTURE: CPT

## 2022-11-08 PROCEDURE — 93010 ELECTROCARDIOGRAM REPORT: CPT | Performed by: INTERNAL MEDICINE

## 2022-11-08 PROCEDURE — 94760 N-INVAS EAR/PLS OXIMETRY 1: CPT

## 2022-11-08 PROCEDURE — 83735 ASSAY OF MAGNESIUM: CPT

## 2022-11-08 PROCEDURE — 80053 COMPREHEN METABOLIC PANEL: CPT

## 2022-11-08 PROCEDURE — 700111 HCHG RX REV CODE 636 W/ 250 OVERRIDE (IP): Performed by: HOSPITALIST

## 2022-11-08 PROCEDURE — 94640 AIRWAY INHALATION TREATMENT: CPT

## 2022-11-08 PROCEDURE — 700111 HCHG RX REV CODE 636 W/ 250 OVERRIDE (IP): Performed by: STUDENT IN AN ORGANIZED HEALTH CARE EDUCATION/TRAINING PROGRAM

## 2022-11-08 PROCEDURE — 94660 CPAP INITIATION&MGMT: CPT

## 2022-11-08 PROCEDURE — 700102 HCHG RX REV CODE 250 W/ 637 OVERRIDE(OP)

## 2022-11-08 PROCEDURE — A9270 NON-COVERED ITEM OR SERVICE: HCPCS | Performed by: STUDENT IN AN ORGANIZED HEALTH CARE EDUCATION/TRAINING PROGRAM

## 2022-11-08 PROCEDURE — 770020 HCHG ROOM/CARE - TELE (206)

## 2022-11-08 PROCEDURE — 700102 HCHG RX REV CODE 250 W/ 637 OVERRIDE(OP): Performed by: STUDENT IN AN ORGANIZED HEALTH CARE EDUCATION/TRAINING PROGRAM

## 2022-11-08 PROCEDURE — 700101 HCHG RX REV CODE 250: Performed by: STUDENT IN AN ORGANIZED HEALTH CARE EDUCATION/TRAINING PROGRAM

## 2022-11-08 PROCEDURE — A9270 NON-COVERED ITEM OR SERVICE: HCPCS

## 2022-11-08 RX ORDER — BENZONATATE 100 MG/1
100 CAPSULE ORAL 3 TIMES DAILY PRN
Status: DISCONTINUED | OUTPATIENT
Start: 2022-11-08 | End: 2022-11-09 | Stop reason: HOSPADM

## 2022-11-08 RX ORDER — FUROSEMIDE 20 MG/1
20 TABLET ORAL
Status: DISCONTINUED | OUTPATIENT
Start: 2022-11-09 | End: 2022-11-09 | Stop reason: HOSPADM

## 2022-11-08 RX ORDER — AZITHROMYCIN 250 MG/1
500 TABLET, FILM COATED ORAL DAILY
Status: COMPLETED | OUTPATIENT
Start: 2022-11-08 | End: 2022-11-09

## 2022-11-08 RX ORDER — BISACODYL 10 MG
10 SUPPOSITORY, RECTAL RECTAL
Status: DISCONTINUED | OUTPATIENT
Start: 2022-11-08 | End: 2022-11-09 | Stop reason: HOSPADM

## 2022-11-08 RX ORDER — POLYETHYLENE GLYCOL 3350 17 G/17G
1 POWDER, FOR SOLUTION ORAL
Status: DISCONTINUED | OUTPATIENT
Start: 2022-11-08 | End: 2022-11-09 | Stop reason: HOSPADM

## 2022-11-08 RX ORDER — AMOXICILLIN 250 MG
2 CAPSULE ORAL 2 TIMES DAILY
Status: DISCONTINUED | OUTPATIENT
Start: 2022-11-08 | End: 2022-11-09 | Stop reason: HOSPADM

## 2022-11-08 RX ADMIN — IPRATROPIUM BROMIDE AND ALBUTEROL SULFATE 3 ML: .5; 2.5 SOLUTION RESPIRATORY (INHALATION) at 19:19

## 2022-11-08 RX ADMIN — IPRATROPIUM BROMIDE AND ALBUTEROL SULFATE 3 ML: .5; 2.5 SOLUTION RESPIRATORY (INHALATION) at 03:11

## 2022-11-08 RX ADMIN — IPRATROPIUM BROMIDE AND ALBUTEROL SULFATE 3 ML: .5; 2.5 SOLUTION RESPIRATORY (INHALATION) at 14:47

## 2022-11-08 RX ADMIN — MONTELUKAST 10 MG: 10 TABLET, FILM COATED ORAL at 18:00

## 2022-11-08 RX ADMIN — FUROSEMIDE 20 MG: 10 INJECTION, SOLUTION INTRAMUSCULAR; INTRAVENOUS at 06:02

## 2022-11-08 RX ADMIN — TRAZODONE HYDROCHLORIDE 50 MG: 100 TABLET ORAL at 19:52

## 2022-11-08 RX ADMIN — ATORVASTATIN CALCIUM 20 MG: 20 TABLET, FILM COATED ORAL at 19:53

## 2022-11-08 RX ADMIN — ENOXAPARIN SODIUM 60 MG: 60 INJECTION SUBCUTANEOUS at 06:01

## 2022-11-08 RX ADMIN — ENOXAPARIN SODIUM 60 MG: 60 INJECTION SUBCUTANEOUS at 18:00

## 2022-11-08 RX ADMIN — BENZONATATE 100 MG: 100 CAPSULE ORAL at 13:25

## 2022-11-08 RX ADMIN — FAMOTIDINE 20 MG: 20 TABLET, FILM COATED ORAL at 06:02

## 2022-11-08 RX ADMIN — SENNOSIDES AND DOCUSATE SODIUM 2 TABLET: 50; 8.6 TABLET ORAL at 18:00

## 2022-11-08 RX ADMIN — POLYETHYLENE GLYCOL 3350 1 PACKET: 17 POWDER, FOR SOLUTION ORAL at 13:26

## 2022-11-08 RX ADMIN — ARIPIPRAZOLE 30 MG: 10 TABLET ORAL at 06:02

## 2022-11-08 RX ADMIN — AZITHROMYCIN MONOHYDRATE 500 MG: 250 TABLET ORAL at 12:15

## 2022-11-08 RX ADMIN — IPRATROPIUM BROMIDE AND ALBUTEROL SULFATE 3 ML: .5; 2.5 SOLUTION RESPIRATORY (INHALATION) at 10:44

## 2022-11-08 RX ADMIN — IPRATROPIUM BROMIDE AND ALBUTEROL SULFATE 3 ML: .5; 2.5 SOLUTION RESPIRATORY (INHALATION) at 06:53

## 2022-11-08 RX ADMIN — BUDESONIDE AND FORMOTEROL FUMARATE DIHYDRATE 2 PUFF: 80; 4.5 AEROSOL RESPIRATORY (INHALATION) at 06:54

## 2022-11-08 RX ADMIN — FAMOTIDINE 20 MG: 20 TABLET, FILM COATED ORAL at 18:00

## 2022-11-08 RX ADMIN — BUDESONIDE AND FORMOTEROL FUMARATE DIHYDRATE 2 PUFF: 80; 4.5 AEROSOL RESPIRATORY (INHALATION) at 19:20

## 2022-11-08 ASSESSMENT — ENCOUNTER SYMPTOMS
MUSCULOSKELETAL NEGATIVE: 1
NECK PAIN: 0
BLOOD IN STOOL: 0
NAUSEA: 0
EYES NEGATIVE: 1
CHILLS: 0
DIZZINESS: 0
CONSTITUTIONAL NEGATIVE: 1
BACK PAIN: 0
GASTROINTESTINAL NEGATIVE: 1
NEUROLOGICAL NEGATIVE: 1
DIAPHORESIS: 0
ABDOMINAL PAIN: 0
SPUTUM PRODUCTION: 1
VOMITING: 0
PALPITATIONS: 0
SHORTNESS OF BREATH: 1
WHEEZING: 1
FEVER: 0
PSYCHIATRIC NEGATIVE: 1
HEADACHES: 0
HEMOPTYSIS: 0
DIARRHEA: 0
SORE THROAT: 0
CONSTIPATION: 0
COUGH: 1

## 2022-11-08 ASSESSMENT — PAIN DESCRIPTION - PAIN TYPE
TYPE: CHRONIC PAIN

## 2022-11-08 NOTE — CARE PLAN
"The patient is Watcher - Medium risk of patient condition declining or worsening    Shift Goals  Clinical Goals: Respiratory care, rest, antibiotics  Patient Goals: Rest  Family Goals: ARAM      Problem: Respiratory  Goal: Patient will achieve/maintain optimum respiratory ventilation and gas exchange  Outcome: Progressing       Patient intermittently declined CPAP throughout the night, stated it made her feel anxious. Troubleshooting done several times, respiratory at bedside a few times to adjust. Patient stated to this RN \"just get this thing off of me!.\" Patient placed back on nasal cannula and she is maintaining appropriate o2 saturation while sleeping. Updated respiratory therapist Sarah DE DIOS.   "

## 2022-11-08 NOTE — CARE PLAN
Problem: Bronchoconstriction  Goal: Improve in air movement and diminished wheezing  Description: Target End Date:  2 to 3 days    1.  Implement inhaled treatments  2.  Evaluate and manage medication effects  Outcome: Progressing       Respiratory Update    Q4 Duoneb, Bid Symbicort, Noc Cpap, 3L NC    Pt tolerating current treatments well with no adverse reactions.

## 2022-11-08 NOTE — CARE PLAN
The patient is Watcher - Medium risk of patient condition declining or worsening    Shift Goals  Clinical Goals: Monitor I&Os, Pulm hygiene, increase mobility  Patient Goals: rest, feel better  Family Goals: not present at bedside    Progress made toward(s) clinical / shift goals:        Problem: Knowledge Deficit - Standard  Goal: Patient and family/care givers will demonstrate understanding of plan of care, disease process/condition, diagnostic tests and medications  Outcome: Progressing     Problem: Fall Risk  Goal: Patient will remain free from falls  Outcome: Progressing     Problem: Discharge Barriers/Planning  Goal: Patient's continuum of care needs are met  Outcome: Progressing     Problem: Respiratory  Goal: Patient will achieve/maintain optimum respiratory ventilation and gas exchange  Outcome: Progressing     Problem: Pain - Standard  Goal: Alleviation of pain or a reduction in pain to the patient’s comfort goal  Outcome: Progressing

## 2022-11-08 NOTE — DISCHARGE PLANNING
Received Choice form at [phone]  Agency/Facility Name: [facility]  Referral sent per Choice form @ [phone]

## 2022-11-08 NOTE — RESPIRATORY CARE
COPD EDUCATION by COPD CLINICAL EDUCATOR  11/8/2022 at 7:57 AM by Albania Gonsalez, RRT     Patient reviewed by COPD education team. Patient does not qualify for the COPD program.     Name band;

## 2022-11-08 NOTE — PROGRESS NOTES
Monitor Summary: SR 75-92, MI 0.11, QRS 0.11, QT 0.40, with frequent PACs/PVCs and rare triplets per strip from monitor room.

## 2022-11-08 NOTE — FACE TO FACE
Face to Face Note  -  Durable Medical Equipment    Cayetano Bloom M.D. - NPI: [id number]  I certify that this patient is under my care and that they had a durable medical equipment(DME)face to face encounter by myself that meets the physician DME face-to-face encounter requirements with this patient on:    Date of encounter:   Patient:                    MRN:                       YOB: 2024  Lorene Haro  [id number]  1975     The encounter with the patient was in whole, or in part, for the following medical condition, which is the primary reason for durable medical equipment:  Other - hypercapnia with restrictive lung disease    I certify that, based on my findings, the following durable medical equipment is medically necessary:    CI-PAP.    My Clinical findings support the need for the above equipment due to:  Other - hypercapnia with restrictive lung disease

## 2022-11-08 NOTE — NON-PROVIDER
Suggested components for Daily Progress (SOAP) Note:     Primary Team: [facility]   Attending: Dr. Haris Back  Senior Resident: Dr. Cayetano Bloom  Contact:  [phone]    Interval 24 hour history and Subjective Data:   46 yo F with history of KATHIA/OHS, pulmonary hypertension, asthma with recurrent admits for respiratory failure in the past year presented 11/6 with shortness of breath, somnolence, dysphagia, and productive cough likely secondary to pneumonia in the setting of OHS/KATHIA with CPAP noncompliance. Patient received FEES 11/7 with speech therapy and was placed on moist/minced diet and notes she has been swallowing her food well. Overnight, patient had CPAP on for 4 hours before it started making her feel anxious and was placed on NC maintaining appropriate O2 while sleeping. She has been using acapella and notes her breathing feels better this morning but she feels general weakness and fatigue. Her legs are  to touch and swollen. She notes her abdominal pain has gone away. She has been able to urinate on her own during the day and uses a pure wick catheter over night due to trouble getting up. Patient denies dysuria, hematuria. Patient has not had a bowel movement since 11/4.She also still notes chronic back pain that she thinks is from being in the hospital bed.    Objective Data:   Physical Exam:   Vitals:   Vitals:    11/08/22 1120   BP: 126/68   Pulse: 83   Resp: 18   Temp: 36.8 °C (98.2 °F)   SpO2: 92%       Constitutional: Obese, well appearing female. Sitting up in chair, speaking softly. AOx4.  Eyes: EOMs intact, anicertic.  ENT: Moist, pink oral mucosa. No lymphadenopathy.  CV: Distant heart sounds, irregularly irregular rhythm. No murmurs, rubs, gallops. 2+ pulses radial and femoral.   Resp: Soft lung sounds with shallow breaths on exam with rhonchi and wheezing bilaterally. No crackles.   Abd: Nontender to palpation in all quadrants.  Extremities: +1 swelling bilateral  LE.   Skin: Stasis dermatitis bilateral LE. Dry, cracked skin with erythema. No warmth.  Neuro: Decreased sensation bilateral feet. No other focal or neural deficits.  Physical Exam     Labs and Imagin/8 EKG sinus rhythm, atrial premature complexes, borderline short AL interval, probably left atrial enlargement, consider right VCD or RVH. QTC: 392   GFR 67 (down from 74 )   Mg 2.2    Lab Results   Component Value Date/Time    WBC 6.9 2022 02:05 AM    RBC 4.55 2022 02:05 AM    HEMOGLOBIN 12.0 2022 02:05 AM    HEMATOCRIT 40.4 2022 02:05 AM    MCV 88.8 2022 02:05 AM    MCH 26.4 (L) 2022 02:05 AM    MCHC 29.7 (L) 2022 02:05 AM    MPV 11.4 2022 02:05 AM    NEUTSPOLYS 66.20 2022 02:05 AM    LYMPHOCYTES 24.20 2022 02:05 AM    MONOCYTES 8.80 2022 02:05 AM    EOSINOPHILS 0.10 2022 02:05 AM    BASOPHILS 0.10 2022 02:05 AM    HYPOCHROMIA 1+ 2022 10:41 PM    ANISOCYTOSIS 1+ 2022 05:36 PM       Lab Results   Component Value Date/Time    SODIUM 144 2022 02:05 AM    POTASSIUM 4.0 2022 02:05 AM    CHLORIDE 102 2022 02:05 AM    CO2 30 2022 02:05 AM    GLUCOSE 100 (H) 2022 02:05 AM    BUN 31 (H) 2022 02:05 AM    CREATININE 1.02 2022 02:05 AM    CREATININE 0.9 2008 01:45 PM        Procal 0.07   ABG 7.32/53/79/30    Assessment and Plan:     1) Acute Respiratory Failure secondary to pneumonia in the setting of KATHIA/OHS, pulmonary HTN, and asthma.  Assessment: Patient presented  with shortness of breath, somnolence, productive cough on home O2 requirement of 3L and noncompliance with CPAP. Her workup showed ABG showing chronic respiratory acidosis with hypercarbia, with chest XR showing interstitial edema and possible multifocal pneumonia most concerning for pneumonia.  Plan:  - Diuresis with furosemide 20 mg BID IV started . Will change to 20 mg PO qd .   -Speech  therapy completed FEES 11/7, on minced/moist diet and recommends home health for speech services.  - Bronchodilation with ipatropium albuterol 3mL every 4 hours.  -Antinflammatory with Montelukast 10 mg PO every evening.  -Low dose steroids if wheezing, budesonide-formoterol 80-4.5 mcg 2 puffs BID.  -Azithromycin 500 mg given 11/6, . Patient having increased productive cough. Start Azithromycin 500 mg PO qd x 3 days ending 11/10.   -Benzonatate (tessalon) capsule 100 mg TID prn cough  -Social work working to get patient CPAP at home, patient in agreement with this plan.  -Completed influenza vaccine 10/26 and Pneumovax in 2018.    2) LE edema  Assessment: Chronic lower extremity edema present bilaterally with venous stasis dermatitis changes. No warmth or signs of acute cellulitis.   Plan:   - Continue furosemide 20 mg PO BID    -Can consider Gabapentin 300 mg TID PO if needed for neuropathic pain.  -SCDs as tolerated, lower extremity elevation.     3) Constipation  Assessment: Patient has not had bowel movement since 11/4. Has been NPO for the last two days but has constipation at baseline. No abdominal pain.  Plan:  -Senna-docusate (pericolace) 8.6-50 mg BID for constipation, hold if loose stool.  -Polyethylene glycol/lytes (miralax) 1 time daily prn constipation  -Magnesium hydroxide 30 mL 1 time daily  -Dulcolax 10 mg 1 time daily prn if magnesium hydroxide ineffective    4)Tobacco Cessation  Assessment: Patient quit cigarettes 4 weeks ago with 31 year history of 1/4 ppd.   Plan:  -Nicotine patch 14 mg patch qd x 6 weeks, 7 mg x 2 weeks if needed    5) Atrial fibrillation   Assessment: Patient with history of atrial fibrillation monitored by anticoagulation clinic and discontinued from Warfarin 5/2022.  Plan:  -CHADVASC 3 with 3.2% stroke risk.   -Loop recorder was placed in 7/2022.  -Enoxaparin 60 mg q 12 hours DVT prophylaxis     6)HTN  Assessment: stable problem monitored outpatient  -Continue  Furosemide 20 mg PO qd    7)HLD   Assessment: stable problem monitored outpatient  -Home meds Atorvastatin 20 mg qd    8) Schizophrenia/Bipolar Disorder  Assessment: Stable on home medications of Abilify 30 mg every morning and Trazodone 50 mg every night.  -Continue delirium protocol monitoring light, noise, and orientation in room.    9) Hyperglycemia  Assessment: Patient with prediabetes, A1C 5.7 9/2022 (trending down from 6.3 1 year ago), with recent glucose 100.  -No medications needed at this time, continue to monitor with lifestyle modifications.    10) Iron Deficiency Anemia  Assessment: Patient with microcytic anemia with elevated RDW. Hgb 11.7.   -Encourage follow up outpatient for monitoring.   -Consider oral iron supplementation after constipation resolves.    11) Chronic back pain  Assessment: Chronic issue likely exacerbated by being immobilized in hospital bed.  -Patient not requesting medication at this time.

## 2022-11-08 NOTE — PROGRESS NOTES
Monitor summary:     Rhythm : SR  Rate : 75-94  Ectopy : RARE PVC'S AND PAC'S    ME=.13  QRS=.10  QT=.28        Per telemetry strip summary from monitor room.

## 2022-11-08 NOTE — CARE PLAN
Problem: Bronchoconstriction  Goal: Improve in air movement and diminished wheezing  Description: Target End Date:  2 to 3 days    1.  Implement inhaled treatments  2.  Evaluate and manage medication effects  Outcome: Progressing     Douneb Q4    Problem: Ventilation  Goal: Ability to achieve and maintain unassisted ventilation or tolerate decreased levels of ventilator support  Description: Target End Date:  4 days     Document on Vent flowsheet    1.  Support and monitor invasive and noninvasive mechanical ventilation  2.  Monitor ventilator weaning response  3.  Perform ventilator associated pneumonia prevention interventions  4.  Manage ventilation therapy by monitoring diagnostic test results  Outcome: Progressing     Pt on NOC CPAP, wore for 3- 4 hours total

## 2022-11-08 NOTE — PROGRESS NOTES
"Patient had 10 second run of PSVT (highest rate 179 bpm). Patient reports \"not feeling too good.\" Reported to on call resident Gloria PEREZ. New order for EKG and labs placed by provider Gloria PEREZ. HR now sustaining 70 - 90 bpm NSR     2300: Respiratory at bedside for duoneb and CPAP application         "

## 2022-11-08 NOTE — PROGRESS NOTES
Patient ambulated SBAx1 with personal FWW to the bathroom to void. Patient requested a pure wick be placed overnight due to her anxiety of urinary urgency while getting IV lasix. Patient did have an episode of incontinence prior to ambulating to bathroom. Pure wick placed after ketty care completed

## 2022-11-09 ENCOUNTER — TELEPHONE (OUTPATIENT)
Dept: NEUROLOGY | Facility: MEDICAL CENTER | Age: 47
End: 2022-11-09
Payer: MEDICAID

## 2022-11-09 VITALS
WEIGHT: 283.29 LBS | HEIGHT: 63 IN | DIASTOLIC BLOOD PRESSURE: 79 MMHG | TEMPERATURE: 98.1 F | SYSTOLIC BLOOD PRESSURE: 144 MMHG | BODY MASS INDEX: 50.2 KG/M2 | OXYGEN SATURATION: 98 % | RESPIRATION RATE: 16 BRPM | HEART RATE: 81 BPM

## 2022-11-09 LAB
ALBUMIN SERPL BCP-MCNC: 4.3 G/DL (ref 3.2–4.9)
ALBUMIN/GLOB SERPL: 1.7 G/DL
ALP SERPL-CCNC: 89 U/L (ref 30–99)
ALT SERPL-CCNC: 25 U/L (ref 2–50)
ANION GAP SERPL CALC-SCNC: 8 MMOL/L (ref 7–16)
AST SERPL-CCNC: 18 U/L (ref 12–45)
BASOPHILS # BLD AUTO: 0.3 % (ref 0–1.8)
BASOPHILS # BLD: 0.02 K/UL (ref 0–0.12)
BILIRUB SERPL-MCNC: 1.7 MG/DL (ref 0.1–1.5)
BUN SERPL-MCNC: 22 MG/DL (ref 8–22)
CALCIUM SERPL-MCNC: 9.6 MG/DL (ref 8.5–10.5)
CHLORIDE SERPL-SCNC: 101 MMOL/L (ref 96–112)
CO2 SERPL-SCNC: 34 MMOL/L (ref 20–33)
CREAT SERPL-MCNC: 1.01 MG/DL (ref 0.5–1.4)
EOSINOPHIL # BLD AUTO: 0.04 K/UL (ref 0–0.51)
EOSINOPHIL NFR BLD: 0.6 % (ref 0–6.9)
ERYTHROCYTE [DISTWIDTH] IN BLOOD BY AUTOMATED COUNT: 57.8 FL (ref 35.9–50)
GFR SERPLBLD CREATININE-BSD FMLA CKD-EPI: 68 ML/MIN/1.73 M 2
GLOBULIN SER CALC-MCNC: 2.6 G/DL (ref 1.9–3.5)
GLUCOSE SERPL-MCNC: 111 MG/DL (ref 65–99)
HCT VFR BLD AUTO: 42.7 % (ref 37–47)
HGB BLD-MCNC: 12.9 G/DL (ref 12–16)
IMM GRANULOCYTES # BLD AUTO: 0.03 K/UL (ref 0–0.11)
IMM GRANULOCYTES NFR BLD AUTO: 0.5 % (ref 0–0.9)
LYMPHOCYTES # BLD AUTO: 1.7 K/UL (ref 1–4.8)
LYMPHOCYTES NFR BLD: 26.4 % (ref 22–41)
MAGNESIUM SERPL-MCNC: 2.2 MG/DL (ref 1.5–2.5)
MCH RBC QN AUTO: 26.5 PG (ref 27–33)
MCHC RBC AUTO-ENTMCNC: 30.2 G/DL (ref 33.6–35)
MCV RBC AUTO: 87.7 FL (ref 81.4–97.8)
MONOCYTES # BLD AUTO: 0.48 K/UL (ref 0–0.85)
MONOCYTES NFR BLD AUTO: 7.5 % (ref 0–13.4)
NEUTROPHILS # BLD AUTO: 4.17 K/UL (ref 2–7.15)
NEUTROPHILS NFR BLD: 64.7 % (ref 44–72)
NRBC # BLD AUTO: 0 K/UL
NRBC BLD-RTO: 0 /100 WBC
PLATELET # BLD AUTO: 184 K/UL (ref 164–446)
PMV BLD AUTO: 10.3 FL (ref 9–12.9)
POTASSIUM SERPL-SCNC: 3.8 MMOL/L (ref 3.6–5.5)
PROT SERPL-MCNC: 6.9 G/DL (ref 6–8.2)
RBC # BLD AUTO: 4.87 M/UL (ref 4.2–5.4)
SODIUM SERPL-SCNC: 143 MMOL/L (ref 135–145)
WBC # BLD AUTO: 6.4 K/UL (ref 4.8–10.8)

## 2022-11-09 PROCEDURE — 700102 HCHG RX REV CODE 250 W/ 637 OVERRIDE(OP)

## 2022-11-09 PROCEDURE — 700102 HCHG RX REV CODE 250 W/ 637 OVERRIDE(OP): Performed by: STUDENT IN AN ORGANIZED HEALTH CARE EDUCATION/TRAINING PROGRAM

## 2022-11-09 PROCEDURE — 94640 AIRWAY INHALATION TREATMENT: CPT

## 2022-11-09 PROCEDURE — 700101 HCHG RX REV CODE 250: Performed by: STUDENT IN AN ORGANIZED HEALTH CARE EDUCATION/TRAINING PROGRAM

## 2022-11-09 PROCEDURE — 80053 COMPREHEN METABOLIC PANEL: CPT

## 2022-11-09 PROCEDURE — 94760 N-INVAS EAR/PLS OXIMETRY 1: CPT

## 2022-11-09 PROCEDURE — A9270 NON-COVERED ITEM OR SERVICE: HCPCS

## 2022-11-09 PROCEDURE — 83735 ASSAY OF MAGNESIUM: CPT

## 2022-11-09 PROCEDURE — 92526 ORAL FUNCTION THERAPY: CPT

## 2022-11-09 PROCEDURE — 85025 COMPLETE CBC W/AUTO DIFF WBC: CPT

## 2022-11-09 PROCEDURE — 94669 MECHANICAL CHEST WALL OSCILL: CPT

## 2022-11-09 PROCEDURE — 700111 HCHG RX REV CODE 636 W/ 250 OVERRIDE (IP): Performed by: STUDENT IN AN ORGANIZED HEALTH CARE EDUCATION/TRAINING PROGRAM

## 2022-11-09 PROCEDURE — A9270 NON-COVERED ITEM OR SERVICE: HCPCS | Performed by: STUDENT IN AN ORGANIZED HEALTH CARE EDUCATION/TRAINING PROGRAM

## 2022-11-09 PROCEDURE — 36415 COLL VENOUS BLD VENIPUNCTURE: CPT

## 2022-11-09 RX ORDER — BENZONATATE 100 MG/1
100 CAPSULE ORAL 3 TIMES DAILY PRN
Qty: 60 CAPSULE | Refills: 0 | Status: SHIPPED | OUTPATIENT
Start: 2022-11-09 | End: 2022-11-13 | Stop reason: SDUPTHER

## 2022-11-09 RX ORDER — BENZONATATE 100 MG/1
100 CAPSULE ORAL 3 TIMES DAILY PRN
Qty: 60 CAPSULE | Refills: 0 | Status: SHIPPED | OUTPATIENT
Start: 2022-11-09 | End: 2022-11-09 | Stop reason: SDUPTHER

## 2022-11-09 RX ORDER — POTASSIUM CHLORIDE 750 MG/1
10 TABLET, EXTENDED RELEASE ORAL DAILY
Qty: 30 TABLET | Refills: 0 | Status: SHIPPED | OUTPATIENT
Start: 2022-11-09 | End: 2022-11-09 | Stop reason: SDUPTHER

## 2022-11-09 RX ORDER — FUROSEMIDE 20 MG/1
20 TABLET ORAL DAILY
Qty: 30 TABLET | Refills: 0 | Status: SHIPPED | OUTPATIENT
Start: 2022-11-10 | End: 2022-11-13 | Stop reason: SDUPTHER

## 2022-11-09 RX ORDER — POTASSIUM CHLORIDE 750 MG/1
10 TABLET, EXTENDED RELEASE ORAL DAILY
Qty: 30 TABLET | Refills: 0 | Status: SHIPPED | OUTPATIENT
Start: 2022-11-09 | End: 2022-11-13 | Stop reason: SDUPTHER

## 2022-11-09 RX ORDER — FUROSEMIDE 20 MG/1
20 TABLET ORAL DAILY
Qty: 30 TABLET | Refills: 0 | Status: SHIPPED | OUTPATIENT
Start: 2022-11-10 | End: 2022-11-09 | Stop reason: SDUPTHER

## 2022-11-09 RX ADMIN — IPRATROPIUM BROMIDE AND ALBUTEROL SULFATE 3 ML: .5; 2.5 SOLUTION RESPIRATORY (INHALATION) at 15:56

## 2022-11-09 RX ADMIN — IPRATROPIUM BROMIDE AND ALBUTEROL SULFATE 3 ML: .5; 2.5 SOLUTION RESPIRATORY (INHALATION) at 02:50

## 2022-11-09 RX ADMIN — ENOXAPARIN SODIUM 60 MG: 60 INJECTION SUBCUTANEOUS at 05:05

## 2022-11-09 RX ADMIN — ENOXAPARIN SODIUM 60 MG: 60 INJECTION SUBCUTANEOUS at 17:26

## 2022-11-09 RX ADMIN — FAMOTIDINE 20 MG: 20 TABLET, FILM COATED ORAL at 05:05

## 2022-11-09 RX ADMIN — ATORVASTATIN CALCIUM 20 MG: 20 TABLET, FILM COATED ORAL at 20:30

## 2022-11-09 RX ADMIN — BUDESONIDE AND FORMOTEROL FUMARATE DIHYDRATE 2 PUFF: 80; 4.5 AEROSOL RESPIRATORY (INHALATION) at 19:33

## 2022-11-09 RX ADMIN — FAMOTIDINE 20 MG: 20 TABLET, FILM COATED ORAL at 17:26

## 2022-11-09 RX ADMIN — IPRATROPIUM BROMIDE AND ALBUTEROL SULFATE 3 ML: .5; 2.5 SOLUTION RESPIRATORY (INHALATION) at 19:33

## 2022-11-09 RX ADMIN — MONTELUKAST 10 MG: 10 TABLET, FILM COATED ORAL at 17:26

## 2022-11-09 RX ADMIN — BUDESONIDE AND FORMOTEROL FUMARATE DIHYDRATE 2 PUFF: 80; 4.5 AEROSOL RESPIRATORY (INHALATION) at 07:20

## 2022-11-09 RX ADMIN — SENNOSIDES AND DOCUSATE SODIUM 2 TABLET: 50; 8.6 TABLET ORAL at 17:26

## 2022-11-09 RX ADMIN — SENNOSIDES AND DOCUSATE SODIUM 2 TABLET: 50; 8.6 TABLET ORAL at 05:05

## 2022-11-09 RX ADMIN — TRAZODONE HYDROCHLORIDE 50 MG: 100 TABLET ORAL at 20:30

## 2022-11-09 RX ADMIN — IPRATROPIUM BROMIDE AND ALBUTEROL SULFATE 3 ML: .5; 2.5 SOLUTION RESPIRATORY (INHALATION) at 07:19

## 2022-11-09 RX ADMIN — FUROSEMIDE 20 MG: 20 TABLET ORAL at 05:05

## 2022-11-09 RX ADMIN — IPRATROPIUM BROMIDE AND ALBUTEROL SULFATE 3 ML: .5; 2.5 SOLUTION RESPIRATORY (INHALATION) at 11:22

## 2022-11-09 RX ADMIN — ARIPIPRAZOLE 30 MG: 10 TABLET ORAL at 05:05

## 2022-11-09 RX ADMIN — AZITHROMYCIN MONOHYDRATE 500 MG: 250 TABLET ORAL at 05:04

## 2022-11-09 ASSESSMENT — ENCOUNTER SYMPTOMS
CONSTIPATION: 0
FEVER: 0
CHILLS: 0
HEADACHES: 0
COUGH: 1
MUSCULOSKELETAL NEGATIVE: 1
SHORTNESS OF BREATH: 1
ABDOMINAL PAIN: 0
NECK PAIN: 0
VOMITING: 0
HEMOPTYSIS: 0
PALPITATIONS: 0
DIARRHEA: 0
WHEEZING: 1
DIAPHORESIS: 0
BLOOD IN STOOL: 0
DIZZINESS: 0
EYES NEGATIVE: 1
BACK PAIN: 0
SORE THROAT: 0
NAUSEA: 0
PSYCHIATRIC NEGATIVE: 1
NEUROLOGICAL NEGATIVE: 1
CONSTITUTIONAL NEGATIVE: 1
GASTROINTESTINAL NEGATIVE: 1
SPUTUM PRODUCTION: 1

## 2022-11-09 NOTE — PROGRESS NOTES
Monitor Summary:  Rhythm: SR  Rate: 69-76  Ectopy: Occasional PAC's, Rare Couplet PVC's    0.12/0.10/0.39

## 2022-11-09 NOTE — DISCHARGE INSTRUCTIONS
-Please follow-up with your PCP within 7-10 days.  -You will need to have a blood test 7 days after discharge; please contact your PCP for a metabolic panel (electrolytes).  -Please follow-up with speech therapy outpatient regarding your difficulty swallowing. A referral has been placed.  -You were started on furosemide (water pill) and potassium pills; please continue taking these daily.  -An attempt has been made to order CPAP for you, if it is approved it will likely be delivered to you at home.  -Please quit smoking, as this is likely causing worsening of your symptoms. Please discuss with your PCP for options to help.Discharge Instructions    Discharged to home by ambulance with self. Discharged via ambulance, hospital escort: Yes.  Special equipment needed: Oxygen and Walker    Be sure to schedule a follow-up appointment with your primary care doctor or any specialists as instructed.     Discharge Plan:        I understand that a diet low in cholesterol, fat, and sodium is recommended for good health. Unless I have been given specific instructions below for another diet, I accept this instruction as my diet prescription.   Other diet: minced moist; thin liquids    Special Instructions: Chronic Obstructive Pulmonary Disease (COPD) is a long-term, progressive lung disease that makes it harder to breathe. It includes chronic bronchitis, emphysema, and refractory (non-reversible) asthma. With COPD, the airways in your lungs become inflamed and thicken, and the tissue where oxygen is exchanged is destroyed. The flow of air in and out of your lungs decreases. When that happens, less oxygen gets into your body tissues, and it becomes harder to get rid of the waste gas carbon dioxide. As the disease gets worse, shortness of breath makes it harder to remain active. There is no cure for COPD, but it is preventable and treatable.    COPD Patient Discharge Instructions    Diet  Follow a low fat, low cholesterol, low salt  diet unless instructed otherwise by your Doctor. Read the labels on the back of food products and track your intake of fat, cholesterol and salt.  No smoking  Discontinuing smoking will have the biggest impact on preventing progression of disease.  To participate in [employer]’s Quit Tobacco Program, call 479-0019 or visit [employer]/QuitTobacco  Oxygen  If your doctor has order that you wear oxygen at home, it is important to wear it as ordered.  Do not smoke, vape, or use e-cigarettes with oxygen on.  Store in an appropriate location: upright in its art or laid flat down, away from open flames and stoves.   Do not use oil-based creams and moisturizers (ie: petroleum products, oil-based lip moisturizers) or aerosol sprays (ie: hair sprays or deodorants) when using your oxygen equipment.  Be careful with tubing placement to prevent yourself and others from tripping.  Medications  Refer to your personalized Action Plan to manage your symptoms.  Warning signs of an exacerbation  Breathing fast and shallow, worsening shortness of breath (like you just finished exercising)  Chest tightness  Increases in sputum production  Changes in sputum color  Lower oxygen levels than baseline  When to call your doctor  If the warning signs of an exacerbation do not improve  Refer to your personalized Action Plan   Pulmonary Rehab  Your doctor has ordered you a referral to Pulmonary Rehab. Call [phone] to schedule an appointment  Attend your follow-up appointment with your PCP and/or Pulmonologist  Remote Monitoring: At the direction of the remote monitoring on-call provider, you may increase your oxygen by 2 liters above your baseline.     See the educational handout provided by your COPD Navigator for more information. This also explains more about COPD, symptoms of an exacerbation, and some of the tests that you have undergone.    -Is this patient being discharged with medication to prevent blood clots?  No    Is patient  discharged on Warfarin / Coumadin?   No

## 2022-11-09 NOTE — NON-PROVIDER
Suggested components for Daily Progress (SOAP) Note:     Primary Team: [facility]   Attending: Haris Back  Senior Resident: Dr. Bloom  Intern: Dr. Vivi Haines  Contact:  [phone]    Interval 24 hour history and Subjective Data:   46 yo F with acute hypoxic respiratory failure presented 11/6 with shortness of breath and somnolence likely secondary to pneumonia in the setting of OHS/KATHIA, asthma, and pulmonary hypertension. Speech therapy stopped by to see her again and discussed eating thickened foods and home health. Overnight, patient had clear white secretions and cough improved while taking the Tessalon 100 mg capsules. She also notes that she tolerated the CPAP better last night until 0400. She has SCDs on and tolerated well. Had 2 BMs yesterday with bowel regimen.     Objective Data:   Physical Exam:   General: well appearing, sitting in chair, Aox4  HEENT: EOMI, moist mucous membranes  Resp: Clear to auscultation bilaterally, coarse crackles on expiration  CV: RRR, no murmurs, rubs, gallops. +2 radial pulses.  Abd: soft, nontender to palpation, no guarding or rebound  Extremities: +1 L LE edema, R no pitting edema. Erythema, dryness, stasis dermatitis changes bilaterally, SCDs in place    Vitals:   Vitals:    11/09/22 0837   BP: 122/75   Pulse: 74   Resp: 16   Temp: 36.5 °C (97.7 °F)   SpO2: 92%     Labs and Imaging:   Lab Results   Component Value Date/Time    SODIUM 143 11/09/2022 08:39 AM    POTASSIUM 3.8 11/09/2022 08:39 AM    CHLORIDE 101 11/09/2022 08:39 AM    CO2 34 (H) 11/09/2022 08:39 AM    GLUCOSE 111 (H) 11/09/2022 08:39 AM    BUN 22 11/09/2022 08:39 AM    CREATININE 1.01 11/09/2022 08:39 AM    CREATININE 0.9 09/11/2008 01:45 PM       Lab Results   Component Value Date/Time    WBC 6.4 11/09/2022 08:39 AM    RBC 4.87 11/09/2022 08:39 AM    HEMOGLOBIN 12.9 11/09/2022 08:39 AM    HEMATOCRIT 42.7 11/09/2022 08:39 AM    MCV 87.7 11/09/2022 08:39 AM    MCH 26.5 (L) 11/09/2022 08:39  AM    MCHC 30.2 (L) 11/09/2022 08:39 AM    MPV 10.3 11/09/2022 08:39 AM    NEUTSPOLYS 64.70 11/09/2022 08:39 AM    LYMPHOCYTES 26.40 11/09/2022 08:39 AM    MONOCYTES 7.50 11/09/2022 08:39 AM    EOSINOPHILS 0.60 11/09/2022 08:39 AM    BASOPHILS 0.30 11/09/2022 08:39 AM    HYPOCHROMIA 1+ 11/05/2022 10:41 PM    ANISOCYTOSIS 1+ 05/13/2022 05:36 PM       Mg 2.2    EKG 11/8 SR, Rate 70, , atrial premature complexes, probable L atrial enlargement.     Assessment and Plan: 46 yo F with improved hypoxic failure treated with symbicort, duonebs, azithromycin 500 mg x 3 days, and lasix 20 mg PO qd at home baseline O2 requirement of 3L and compliance with CPAP in the setting of OHS/KATHIA, asthma, and likely pneumonia see on 11/5 Chest XR scheduled for speech consult outpatient and medically stable for discharge.    1) Acute Hypoxic respiratory failure, resolved   Assessment: Patient with history of pulmonary hypertension, OHS/KATHIA, asthma admitted 11/5 with SOB and somnolence with chest XR significant for interstitial edema and multifocal pneumonia, ABG indicating chronic respiratory acidosis with hypercarbia, and increasing productive cough most concerning for pneumonia.   Plan:   -CPAP ordered and referral sent for home machine  -Symbicort 80-4.5 mcg 2 puffs BID for wheezing  - Montelukast 10 mg PO qd  -Ipatroprium albuterol 3mL every 4 hours prn breathing  -Completed last dose of Azithromycin 500 mg 11/9.   -Patient up-to-date on Pneumococcal, flu, and COVID vaccines    2) Problem: Oropharyngeal Dysphagia  Assessment: Patient had difficulty swallowing on admission and was evaluated by speech and respiratory.    Plan:   -Speech script ordered for outpatient  -Speech therapist discussed with patient need for soft, moist/minced diet and slowly increasing back to full diet.      3)Stasis Dermatitis bilateral lower extremities  Assessment: Patient with obesity, history of pulmonary hypertension, and largely sedentary lifestyle  with chronic swelling of lower extremities with erythema, dryness, and venous stasis changes. No open wounds, warmth, or signs of infection.  Plan:  -Continue wearing compression socks and elevating legs when sitting      4) HTN/HLD  Assessment: Patient with hypertension and hyperlipidemia on stable medication outpatient.  Plan:  -Continue furosemide 20 mg PO qd, Atorvastatin 20 mg qd     5) Schizophrenia/Bipolar   Assessment: Patient with stable outpatient management of schizophrenia/bipolar.  Plan:  -Continue home meds abilify 30 mg qam, trazodone 50 mg qPM.      6) Tobacco Cessation  Assessment: Patient quit smoking 4 weeks ago. Patient did not require nicotine patch while inpatient.   Plan:   -Continue to monitor outpatient.     7) Atrial fibrillation  Assessment: Patient was discontinued by Cardio from warfarin for unclear diagnosis of atrial fibrillation 5/2022.  Plan:  -Monitor risk outpatient.     8) Hyperglycemia  Assessment: Patient with obesity, HTN, HLD with prediabetes and last A1C 5.7. Glucose inpatient ranged from 124-100.   Plan:   -No need for medication at this time. Continue to monitor outpatient.     9) GERD  Assessment: Patient with outpatient treatment of GERD.  Plan:   -Continue Famotidine 30 mg tablet BID

## 2022-11-09 NOTE — DISCHARGE INSTR - SLP
FEES assessment dated 11/7/22  Clinical Impressions:      The pt presents with a moderate oropharyngeal dysphagia likely acute on chronic related to pneumonia and incoordination of respiratory and swallow systems as well as history of KATHIA and OHS.  Swallow safety is impaired and efficiency is mild.  Diet modification is indicated to mitigate the risk for aspiration.  Recommend minced and moist diet with mildly thick liquids with use of an effortful swallow followed by a second swallow.  Further recommendations are listed below.   Patient would benefit from SLP intervention to address dysphagia and maximize swallow function.       Recommendations  Minced and moist diet with mildly thick liquids (MM5/MT2)  2.  Swallowing Instructions & Precautions:   Supervision: Assist with meal tray set up, Distant supervision - check on patient 2-3 times per meal--tray will be delivered to nurses station so that CNA/RN can ensure patient is out of bed and up in chair before she eats  Positioning: Meals sitting upright in a chair, as tolerated  Medication: Crush with applesauce or puree, as appropriate  Strategies: Small bites/sips, Slow rate of intake, No straws, Multiple swallows (x 2) per bite/sip , Effortful swallow  Oral Care: Q4h  3.  Reflux precautions at all times--upright for 30 minutes following PO  4.  SLP will follow for treatment    Recommend that patient contact [facility] at [email or web address] and request assistance with thickener for liquids.  She can apply online or have assistance in applying.  She will need the above information from her FEES assessment.      Paper form given for exercises for effortful swallow and patient is recommended to have Home health speech therapy for dysphagia tx.

## 2022-11-09 NOTE — PROGRESS NOTES
"Patient wore CPAP for a total of approx 4-5 hours overnight. She became very frustrated with it, as it seems to not fit great with her facial anatomy, troubleshooting unsuccessful and respiratory therapist at bedside several times throughout the night to troubleshoot as well. Patient stated \"just get this thing off of me! I'm sick of this beeping and I'm sick of it needing to be fixed! I'm the only one not sleeping in here!\" Patient ultimately decided to wear nasal cannula at 3L for the remainder of the night starting at 0300.     Patient would benefit from better fitted CPAP which may improve compliance    "

## 2022-11-09 NOTE — CARE PLAN
The patient is Stable - Low risk of patient condition declining or worsening    Shift Goals  Clinical Goals: strict I/O; monitor O2  Patient Goals: rest; discharge  Family Goals: ARAM    Progress made toward(s) clinical / shift goals: Pt states that she feels her respiratory status has been improving since admission. Currently on 3L NC (home dose of O2)    Patient is not progressing towards the following goals:

## 2022-11-09 NOTE — PROGRESS NOTES
Hospital Medicine Daily Progress Note    Note signed  11/8/2022    [facility] Team: [facility]   Attending: Haris Back M.d.  Senior Resident: Dr. Cayetano Bloom  Intern:  Dr. Haines  Contact Number: [phone]    Chief Complaint  Cough and sob    Hospital Course  Lorene Haro is a morbidly obese 47 y.o. female with a past medical history of obstructive sleep apnea, asthma, schizophrenia, chronic obesity hypoventilation syndrome, tobacco use admitted 11/5/2022 with cough and shortness of breath.  She was seen initially by paramedics and was not wearing her nasal cannula.  There is concern of wheezing and coughing.  Per report she had been having productive yellow cough and yellow nasal sputum.    Of note the patient was recently admitted from 10/22 to 10/26/2022.  Upon that time pulmonary did consult.  There is concern of asthma and the patient was on a steroid taper.    Interval Problem Update  -Patient had 10sec of PSVT up to  which self resolved  -On 3L NC; baseline  -Ordered compression stockings  -Changed from furosemide 20mg IV bid to 20mg PO daily  -Started on 2 more doses of azithromycin  -Hadn't had BM since Saturday; subsequently with 2 BMs after bowel regimen started  -Face-to-face and DME for CPAP prior to discharge  -With moderate oropharyngeal dysphagia per speech; recommending speech home health  -Seen by PT/OT without any needs  -Likely discharge tomorrow    I have discussed this patient's plan of care and discharge plan at IDT rounds today with Case Management, Nursing, Nursing leadership, and other members of the IDT team.      Code Status  Full Code    Disposition  Patient is not medically cleared for discharge.   Anticipate discharge to  be determined .  I have placed the appropriate orders for post-discharge needs.    Review of Systems  Review of Systems   Constitutional: Negative.  Negative for chills, diaphoresis and fever.   HENT: Negative.  Negative for ear pain and  sore throat.    Eyes: Negative.    Respiratory:  Positive for cough, sputum production, shortness of breath and wheezing. Negative for hemoptysis.    Cardiovascular:  Positive for leg swelling. Negative for chest pain and palpitations.   Gastrointestinal: Negative.  Negative for abdominal pain, blood in stool, constipation, diarrhea, melena, nausea and vomiting.   Genitourinary: Negative.  Negative for dysuria and hematuria.   Musculoskeletal: Negative.  Negative for back pain, joint pain and neck pain.   Skin: Negative.    Neurological: Negative.  Negative for dizziness and headaches.   Endo/Heme/Allergies: Negative.    Psychiatric/Behavioral: Negative.        Physical Exam  Temp:  [35.9 °C (96.7 °F)-36.8 °C (98.2 °F)] 36 °C (96.8 °F)  Pulse:  [73-90] 84  Resp:  [16-22] 18  BP: (115-126)/(51-68) 115/56  SpO2:  [90 %-96 %] 92 %    Physical Exam  Constitutional:       General: She is not in acute distress.     Appearance: Normal appearance. She is obese. She is ill-appearing. She is not diaphoretic.   HENT:      Head: Normocephalic and atraumatic.      Mouth/Throat:      Mouth: Mucous membranes are moist.      Pharynx: Oropharynx is clear. No oropharyngeal exudate or posterior oropharyngeal erythema.   Eyes:      General: No scleral icterus.     Extraocular Movements: Extraocular movements intact.      Conjunctiva/sclera: Conjunctivae normal.      Pupils: Pupils are equal, round, and reactive to light.   Neck:      Comments: Hirsutism  Cardiovascular:      Rate and Rhythm: Normal rate and regular rhythm.      Heart sounds: Normal heart sounds. No murmur heard.    No friction rub. No gallop.   Pulmonary:      Effort: Pulmonary effort is normal. No respiratory distress.      Breath sounds: Wheezing and rhonchi present. No rales.   Abdominal:      General: Abdomen is flat. Bowel sounds are normal. There is distension (obese).      Palpations: Abdomen is soft. There is no mass.      Tenderness: There is no abdominal  tenderness. There is no guarding or rebound.   Musculoskeletal:         General: Swelling and tenderness present.      Cervical back: Normal range of motion.      Right lower leg: Edema present.      Left lower leg: Edema present.      Comments: B/l venous stasis changes   Skin:     General: Skin is warm and dry.      Capillary Refill: Capillary refill takes less than 2 seconds.      Coloration: Skin is not jaundiced.   Neurological:      General: No focal deficit present.      Mental Status: She is alert and oriented to person, place, and time.      Cranial Nerves: No cranial nerve deficit.       Fluids    Intake/Output Summary (Last 24 hours) at 11/8/2022 1652  Last data filed at 11/8/2022 1400  Gross per 24 hour   Intake 900 ml   Output 1900 ml   Net -1000 ml       Laboratory  Recent Labs     11/06/22  0419 11/07/22  0058 11/08/22  0205   WBC 12.3* 10.0 6.9   RBC 4.61 4.45 4.55   HEMOGLOBIN 12.2 11.7* 12.0   HEMATOCRIT 41.1 39.6 40.4   MCV 89.2 89.0 88.8   MCH 26.5* 26.3* 26.4*   MCHC 29.7* 29.5* 29.7*   RDW 58.5* 58.6* 59.0*   PLATELETCT 194 216 205   MPV 10.6 11.4 11.4       Imaging  DX-CHEST-PORTABLE (1 VIEW)   Final Result      1.  Mild interstitial edema.. Multifocal pneumonia could have a similar appearance.   2.  Airspace disease at the peripheral left base.           Assessment/Plan  * Shortness of breath + Somnolence- (present on admission)  Assessment & Plan  Hypercapnia - CO2 currently 31  Suspect KATHIA w/ hypercapnia  11/6 pCO2: 53  Does not have a CPAP at home.    PLAN  -CPAP ordered  -patient alert/oriented x 4   -Moderate oropharyngeal dysphagia on FEES; home health speech recommended  -symbicort  -duonebs prn  -Continue 2 more doses of azithromycin    Oropharyngeal dysphagia  Assessment & Plan  Minced and moist diet with mild thick liquids  Speech recommends home health speech    Stasis dermatitis of both legs  Assessment & Plan  -lasix 20 BID  -compression socks ordered    Elevated  "troponin  Assessment & Plan  Hypoxia? Demand ischemia likely  Trending down on follow up.    Hyperglycemia  Assessment & Plan  9/2022 A1c:5.7  Monitor glucose  Needs weight loss  Pre diabetic    Insomnia  Assessment & Plan  Continue home trazodone    ?GERD  Assessment & Plan  Continue home famotidine    HLD   Assessment & Plan  Active management with Lipitor 20 mg at bedtime    Tobacco use- (present on admission)  Assessment & Plan  Encourage cessation    Morbid obesity with alveolar hypoventilation (HCC)- (present on admission)  Assessment & Plan  Body mass index is 50.18 kg/m².  Needs weight loss and outpatient polysomnogram  Last 10/22 echo: Unable to estimate right ventricular systolic pressure   due to an inadequate tricuspid regurgitant jet.  2/2022 Echo: RVSP:35mmHg  Dr Gallego's Pulmonary note from 10/23/22 : \"KATHIA dx in 2018 /hr and was previously on  AVAPs titration with final pressure EPAP: 11 cm of water, IPAP: 24/15 cm of water, volume: 450 mL, rate of 14, and 3 L oxygen bleed in. She is an exsmoker but she does not have evidence of COPD\"    PLAN  -CPAP ordered  -symbicort  -duonebs prn    ?possible Schizophrenia vs Bipolar?- (present on admission)  Assessment & Plan  Continue home Abilify       VTE prophylaxis: enoxaparin ppx    I have performed a physical exam and reviewed and updated ROS and Plan today (11/8/2022). In review of yesterday's note (11/7/2022), there are no changes except as documented above.        "

## 2022-11-09 NOTE — DISCHARGE PLANNING
[facility] Transport scheduled with [facility]  Ambulance transport with 3L O2 requested.     LMSW will receive phone call with stretcher time.    ADDENDUM 1500    LMSW called [facility] as transport has not arrived yet. Pilar looking for provider now to  patient, will call nursing station ([phone]) with time shortly. Charge RN and RN updated.    ADDENDUM 1620    LMSW called [facility] back again. Per , [facility] can  the ride. [facility] requested face sheet to be faxed with PCS form.    LMSW faxed information.

## 2022-11-09 NOTE — CARE PLAN
The patient is Stable - Low risk of patient condition declining or worsening    Shift Goals  Clinical Goals: Strict I&O's, pulm hygiene/CPAP, ADL's  Patient Goals: Rest  Family Goals: ARAM      Problem: Discharge Barriers/Planning  Goal: Patient's continuum of care needs are met  Outcome: Progressing     Problem: Respiratory  Goal: Patient will achieve/maintain optimum respiratory ventilation and gas exchange  Outcome: Progressing

## 2022-11-09 NOTE — TELEPHONE ENCOUNTER
New Patient     [facility] Patient is checked in Patient Demographics? Yes    Is visit type and length correct?  Yes    Is referral attached to visit? Yes    Were records received from referring provider? Yes, referring provider is a [facility] so records are in [facility].     Patient was not contacted to have someone accompany them to visit?    Is this appointment scheduled as a Hospital Follow-Up?  Yes    Does the patient require any pre procedure or post procedure follow up? No    If any orders were placed at last visit or intended to be done for this visit do we have Results/Consult Notes? Yes  Labs -  There are recent labs in [facility].   Imaging - There are recent images in Epic.   Referrals - No referrals were ordered at last office visit.        10.  If patient appointment is for Botox - is order pended for provider? No

## 2022-11-09 NOTE — THERAPY
"Speech Language Pathology  Daily Treatment     Patient Name: Lorene Haro  Age:  47 y.o., Sex:  female  Medical Record #: [id number]  Today's Date: 11/8/2022     Precautions  Precautions: Fall Risk, Swallow Precautions ( See Comments)  Comments: Swallow precautions    Assessment    Per the FEES 11/7 \"The pt presents with a moderate oropharyngeal dysphagia likely acute on chronic related to pneumonia and incoordination of respiratory and swallow systems as well as history of KATHIA and OHS.  Swallow safety is impaired and efficiency is mild.  Diet modification is indicated to mitigate the risk for aspiration.  Recommend minced and moist diet with mildly thick liquids with use of an effortful swallow followed by a second swallow.\"      Patient was seen today for skilled dysphagia tx.  She was seated upright in a chair.  She was drinking thin water via straw when I entered and coughing.  She was educated in how to thicken and it was demonstrated to her.  She was able to do it after demonstration independently.  She is concerned about the cost as she uses food stamps she reports.  Patient was educated in laryngeal elevation/effortful swallow exercises/strategies.  She was able to verbalize and demonstrate use, however she appeared to be tired and will likely require reminders and cuing.   Patient would benefit from continued SLP intervention to address dysphagia and maximize swallow function.       Recommendations  Minced and moist diet with mildly thick liquids (MM5/MT2)  2.  Swallowing Instructions & Precautions:   Supervision: Assist with meal tray set up, Distant supervision - check on patient 2-3 times per meal--tray will be delivered to nurses station so that CNA/RN can ensure patient is out of bed and up in chair before she eats  Positioning: Meals sitting upright in a chair, as tolerated  Medication: Crush with applesauce or puree, as appropriate  Strategies: Small bites/sips, Slow rate of intake, No straws, " "Multiple swallows (x 2) per bite/sip , Effortful swallow  Oral Care: Q4h  3.  Reflux precautions at all times--upright for 30 minutes following PO  4.  SLP will follow for treatment    Plan    Continue current treatment plan.    Discharge Recommendations: Recommend home health for continued speech therapy services       Objective       11/08/22 1550   Precautions   Precautions Fall Risk;Swallow Precautions ( See Comments)   Vitals   O2 (LPM) 3   O2 Delivery Device Silicone Nasal Cannula   Pain 0 - 10 Group   Therapist Pain Assessment 0;Post Activity Pain Same as Prior to Activity   Dysphagia    Diet / Liquid Recommendation Mildly Thick (2) - (Nectar Thick)   Nutritional Liquid Intake Rating Scale Thickened beverages (mildly thick unless otherwise specified)   Nutritional Food Intake Rating Scale Total oral diet with multiple consistencies but requiring special preparation or compensations   Recommended Route of Medication Administration   Medication Administration  Crush all Medications in Puree   Patient / Family Goals   Patient / Family Goal #1 \"I've been having trouble swallowing\"   Goal #1 Outcome Progressing slower than expected   Short Term Goals   Short Term Goal # 1 11/7 modified: Pt will be able to consume MM5/MT2 diet with min cues to use swallow precautions with no overt S/Sx of aspiration noted   Goal Outcome # 1 Progressing as expected   Short Term Goal # 3 11/7: Pt will complete 10/10 reps of base of tongue retraction and laryngeal elevation exercises with min cues and \"good\" accuracy as judged by SLP.   Goal Outcome  # 3 Progressing as expected   Education Group   Education Provided Dysphagia   Dysphagia Patient Response Patient;Acceptance;Explanation;Verbal Demonstration;Action Demonstration   Role of SLP Patient Response Patient;Acceptance;Explanation;Verbal Demonstration   Anticipated Discharge Needs   Discharge Recommendations Recommend home health for continued speech therapy services   Therapy " Recommendations Upon DC Dysphagia Training;Patient / Family / Caregiver Education   Interdisciplinary Plan of Care Collaboration   IDT Collaboration with  Nursing;Physician   Patient Position at End of Therapy Seated;Call Light within Reach;Tray Table within Reach;Phone within Reach   Collaboration Comments RN and intern updated

## 2022-11-09 NOTE — DISCHARGE PLANNING
Agency/Facility Name: [facility]  Spoke To: Paula  Outcome: DPA was notified DME has received order. Paula states Pt has been declined as Pt payor source won't cover a CPAP without a sleep study completed within the last year.     KETTY notified.     1230:  Agency/Facility Name: [facility]  Spoke To: Paula  Outcome: DPA notified DME Pt had sleep study completed on 7/3/2022 of this year. [facility]. Paula states she will need to review and submit to medicaid to gain authorization before order is able to be processed.     KETTY notified.     1414:  Agency/Facility Name: [facility]  Spoke To: Paula  Outcome: DPA was notified DME received sleep study information. Paula stated Medicaid has 3 business days to review sleep study and approve. DPA to follow up at a later time.     KETTY notified.     1445:  Agency/Facility Name: [facility]  Spoke To: Paula  Outcome: DPA was notified Pt sleep study results indicate Pt will need an AVAP instead of a CPAP and medicaid won't cover a CPAP for Pt. Paula stated a AVAP is similar to a ventilator. Paula to check on what needs to be fixed on order and call this DPA back.

## 2022-11-10 PROCEDURE — 99239 HOSP IP/OBS DSCHRG MGMT >30: CPT | Mod: GC | Performed by: INTERNAL MEDICINE

## 2022-11-10 NOTE — PROGRESS NOTES
IV removed. Discharge paperwork discussed. All questions answered. Follow up appointment discussed. Patient discharged transport via [facility]. Patient has all personal belongings.

## 2022-11-10 NOTE — DISCHARGE PLANNING
Received call from Nimisha pavon on [facility] requesting help in getting pt home. Original CPS not completely filled out per [facility]. Refaxed CPS/facesheet to [facility] (according to dispatcher  down refaxed to [phone]. ETA approximately 30 minutes. DEISY spoke with Nimisha pavon and updated her on [facility] transfer.

## 2022-11-10 NOTE — DISCHARGE PLANNING
Agency/Facility Name: [facility]  Outcome: DPA left a message for Paula regarding Pt order for CPAP. DPA requesting a call back.     1000:  Agency/Facility Name: [facility]  Spoke To: Paula  Outcome: DPA notified DME Pt has d/c'd and orders cannot be altered. GISELE stated Pt will reach out to PCP to get order for AVAP.

## 2022-11-10 NOTE — DISCHARGE SUMMARY
[facility] Internal Medicine Discharge Summary    Attending: Dr. Haris Back  Senior Resident: Dr. Cayetano Bloom  Intern:  Dr. Vivi Haines  Contact Number: [phone]    CHIEF COMPLAINT ON ADMISSION  Chief Complaint   Patient presents with    Cough     BIB EMS c/o Cough/SOB, pt was not wearing her 2L NC o2 on EMS arrival and has not filled her breathing treatments   Pt c/o wheezing and cough         Reason for Admission  Shortness of breath     Admission Date  11/5/2022    CODE STATUS  Prior    HPI & HOSPITAL COURSE  Patient is a 46yo female with PMH of KATHIA not on CPAP, asthma, schizophrenia, OHS, CHRF on 3L, and tobacco use that was admitted on 11/05/22 with cough and shortness of breath. Received one dose of methylprednisolone IV and one dose of C3/azithromycin for supposed asthma exacerbation with possible bacterial pneumonia. More likely patient's somnolence caused by chronic hypercapnia in the setting of OHS with KATHIA noncompliance; patient's CPAP was recalled. Patient was also evaluated by speech therapy and found to have oropharyngeal dysphagia. Will continue azithromycin to complete 3-day course outpatient due to change in sputum production on admission. Patient's symptoms improved significantly by day of discharge.     No notes on file    Therefore, she is discharged in fair and stable condition to home with close outpatient follow-up.    The patient met 2-midnight criteria for an inpatient stay at the time of discharge.    Discharge Date  11/9/2022    Physical Exam on Day of Discharge  Physical Exam  Constitutional:       General: She is not in acute distress.     Appearance: Normal appearance. She is obese. She is not diaphoretic.   HENT:      Head: Normocephalic and atraumatic.      Mouth/Throat:      Mouth: Mucous membranes are moist.      Pharynx: Oropharynx is clear. No oropharyngeal exudate or posterior oropharyngeal erythema.   Eyes:      General: No scleral icterus.     Extraocular Movements:  Extraocular movements intact.      Conjunctiva/sclera: Conjunctivae normal.      Pupils: Pupils are equal, round, and reactive to light.   Neck:      Comments: Hirsutism  Cardiovascular:      Rate and Rhythm: Normal rate and regular rhythm.      Heart sounds: Normal heart sounds. No murmur heard.    No friction rub. No gallop.   Pulmonary:      Effort: Pulmonary effort is normal. No respiratory distress.      Breath sounds: No wheezing, rhonchi or rales.   Abdominal:      General: Abdomen is flat. Bowel sounds are normal. There is distension (obese).      Palpations: Abdomen is soft. There is no mass.      Tenderness: There is no abdominal tenderness. There is no guarding or rebound.   Musculoskeletal:         General: Swelling and tenderness present.      Cervical back: Normal range of motion.      Right lower leg: Edema present.      Left lower leg: Edema present.      Comments: B/l venous stasis changes   Skin:     General: Skin is warm and dry.      Capillary Refill: Capillary refill takes less than 2 seconds.      Coloration: Skin is not jaundiced.   Neurological:      General: No focal deficit present.      Mental Status: She is alert and oriented to person, place, and time.      Cranial Nerves: No cranial nerve deficit.       FOLLOW UP ITEMS POST DISCHARGE  -Follow-up with PCP within 7-10 days.  -BMP in 7 days d/t starting on furosemide  -Follow-up outpatient with speech therapy for oropharyngeal dysphagia  -Follow-up with obtaining CPAP/BiPAP outpatient    DISCHARGE DIAGNOSES  Principal Problem:    Shortness of breath + Somnolence POA: Yes  Active Problems:    ?possible Schizophrenia vs Bipolar? POA: Yes    Morbid obesity with alveolar hypoventilation (HCC) POA: Yes    Tobacco use POA: Yes    HLD  POA: Unknown    ?GERD POA: Unknown    Insomnia POA: Unknown    Hyperglycemia POA: Unknown    Elevated troponin POA: Unknown    Stasis dermatitis of both legs POA: Unknown    Oropharyngeal dysphagia POA:  Unknown  Resolved Problems:    * No resolved hospital problems. *      FOLLOW UP  Future Appointments   Date Time Provider Department Center   11/15/2022  4:00 PM CRISTOBAL Sahu [facility]   11/16/2022  2:00 PM CRISTOBAL Azul [facility] None   11/23/2022  4:20 PM Nader Barraza M.D. [facility] None     Heriberto Sharp M.D.  [address] [place] [id number] [phone]    Call in 1 week(s)  MAKE AN APPOINTMENT WITHIN THE NEXT WEEK      MEDICATIONS ON DISCHARGE     Medication List        START taking these medications        Instructions   benzonatate 100 MG Caps  Commonly known as: TESSALON   Take 1 Capsule by mouth 3 times a day as needed for Cough.  Dose: 100 mg     furosemide 20 MG Tabs  Commonly known as: LASIX  Next Dose Due: TOMORROW   Take 1 Tablet by mouth every day.  Dose: 20 mg     potassium chloride SA 10 MEQ Tbcr  Commonly known as: K-DUR  Next Dose Due: TOMORROW   Take 1 Tablet by mouth every day.  Dose: 10 mEq            CONTINUE taking these medications        Instructions   aripiprazole 30 MG tablet  Commonly known as: ABILIFY  Next Dose Due: TOMORROW   Take 30 mg by mouth every morning.  Dose: 30 mg     atorvastatin 20 MG Tabs  Commonly known as: LIPITOR  Next Dose Due: TONIGHT   Take 20 mg by mouth at bedtime.  Dose: 20 mg     famotidine 20 MG Tabs  Commonly known as: PEPCID  Next Dose Due: TONIGHT   Take 1 Tablet by mouth 2 times a day.  Dose: 20 mg     fluticasone-salmeterol 100-50 MCG/ACT Aepb  Commonly known as: Advair  Next Dose Due: TONIGHT   Inhale 1 Puff every 12 hours.  Dose: 1 Puff     montelukast 10 MG Tabs  Commonly known as: SINGULAIR  Next Dose Due: TONIGHT   Take 10 mg by mouth every evening.  Dose: 10 mg     Senna 8.6 MG Tabs   Take 1 Tablet by mouth 1 time a day as needed (constipation).  Dose: 8.6 mg     traZODone 50 MG Tabs  Commonly known as: DESYREL  Next Dose Due: TONIGHT   Take 1 Tablet by mouth at bedtime.  Dose: 50 mg     Ventolin  (90 Base)  MCG/ACT Aers inhalation aerosol  Generic drug: albuterol   Inhale 2 Puffs every 6 hours as needed for Shortness of Breath.  Dose: 2 Puff              Allergies  Allergies   Allergen Reactions    Penicillin G Rash and Itching     pt reports that she gets a rash all over her body and gets itchy    Amoxicillin Rash and Itching     Tolerates cephalosporins, pt reports that she gets a rash all over her body and gets itchy       DIET  No orders of the defined types were placed in this encounter.      ACTIVITY  As tolerated.  Weight bearing as tolerated    CONSULTATIONS  None    PROCEDURES  None    LABORATORY  Lab Results   Component Value Date    SODIUM 143 11/09/2022    POTASSIUM 3.8 11/09/2022    CHLORIDE 101 11/09/2022    CO2 34 (H) 11/09/2022    GLUCOSE 111 (H) 11/09/2022    BUN 22 11/09/2022    CREATININE 1.01 11/09/2022    CREATININE 0.9 09/11/2008        Lab Results   Component Value Date    WBC 6.4 11/09/2022    HEMOGLOBIN 12.9 11/09/2022    HEMATOCRIT 42.7 11/09/2022    PLATELETCT 184 11/09/2022        Total time of the discharge process exceeds 30 minutes.

## 2022-11-10 NOTE — CARE PLAN
Problem: Bronchoconstriction  Goal: Improve in air movement and diminished wheezing  Description: Target End Date:  2 to 3 days    1.  Implement inhaled treatments  2.  Evaluate and manage medication effects  Outcome: Progressing  Flowsheets (Taken 11/8/2022 0300 by Sarah Chew, BOLIVAR)  Bronchodilator Goals/Outcome: Diminished Wheezing and Volume of Air Movement Increased  Bronchodilator Indications: History / Diagnosis     Problem: Ventilation  Goal: Ability to achieve and maintain unassisted ventilation or tolerate decreased levels of ventilator support  Description: Target End Date:  4 days     Document on Vent flowsheet    1.  Support and monitor invasive and noninvasive mechanical ventilation  2.  Monitor ventilator weaning response  3.  Perform ventilator associated pneumonia prevention interventions  4.  Manage ventilation therapy by monitoring diagnostic test results  Outcome: Progressing

## 2022-11-10 NOTE — PROGRESS NOTES
Hospital Medicine Daily Progress Note    Note signed  11/9/2022    [facility] Team: [facility]   Attending: Haris Back M.d.  Senior Resident: Dr. Cayetano Bloom  Intern:  Dr. Haines  Contact Number: [phone]    Chief Complaint  Cough and sob    Hospital Course  Lorene Haro is a morbidly obese 47 y.o. female with a past medical history of obstructive sleep apnea, asthma, schizophrenia, chronic obesity hypoventilation syndrome, tobacco use admitted 11/5/2022 with cough and shortness of breath.  She was seen initially by paramedics and was not wearing her nasal cannula.  There is concern of wheezing and coughing.  Per report she had been having productive yellow cough and yellow nasal sputum.    Of note the patient was recently admitted from 10/22 to 10/26/2022.  Upon that time pulmonary did consult.  There is concern of asthma and the patient was on a steroid taper.    Interval Problem Update  -Patient had 10sec of PSVT up to  which self resolved  -On 3L NC; baseline  -Ordered compression stockings  -furosemide 20mg PO daily  -Started on 1 more dose of azithromycin  -Face-to-face and DME for CPAP prior to discharge  -With moderate oropharyngeal dysphagia per speech; recommending speech home health  -Seen by PT/OT without any needs  -Likely discharge today    I have discussed this patient's plan of care and discharge plan at IDT rounds today with Case Management, Nursing, Nursing leadership, and other members of the IDT team.      Code Status  Full Code    Disposition  Anticipate discharge today    Review of Systems  Review of Systems   Constitutional: Negative.  Negative for chills, diaphoresis and fever.   HENT: Negative.  Negative for ear pain and sore throat.    Eyes: Negative.    Respiratory:  Positive for cough, sputum production, shortness of breath and wheezing. Negative for hemoptysis.    Cardiovascular:  Positive for leg swelling. Negative for chest pain and palpitations.    Gastrointestinal: Negative.  Negative for abdominal pain, blood in stool, constipation, diarrhea, melena, nausea and vomiting.   Genitourinary: Negative.  Negative for dysuria and hematuria.   Musculoskeletal: Negative.  Negative for back pain, joint pain and neck pain.   Skin: Negative.    Neurological: Negative.  Negative for dizziness and headaches.   Endo/Heme/Allergies: Negative.    Psychiatric/Behavioral: Negative.        Physical Exam  Temp:  [36.4 °C (97.6 °F)-36.6 °C (97.8 °F)] 36.6 °C (97.8 °F)  Pulse:  [69-89] 85  Resp:  [16-18] 16  BP: (102-126)/(60-75) 124/72  SpO2:  [90 %-98 %] 98 %    Physical Exam  Constitutional:       General: She is not in acute distress.     Appearance: Normal appearance. She is obese. She is ill-appearing. She is not diaphoretic.   HENT:      Head: Normocephalic and atraumatic.      Mouth/Throat:      Mouth: Mucous membranes are moist.      Pharynx: Oropharynx is clear. No oropharyngeal exudate or posterior oropharyngeal erythema.   Eyes:      General: No scleral icterus.     Extraocular Movements: Extraocular movements intact.      Conjunctiva/sclera: Conjunctivae normal.      Pupils: Pupils are equal, round, and reactive to light.   Neck:      Comments: Hirsutism  Cardiovascular:      Rate and Rhythm: Normal rate and regular rhythm.      Heart sounds: Normal heart sounds. No murmur heard.    No friction rub. No gallop.   Pulmonary:      Effort: Pulmonary effort is normal. No respiratory distress.      Breath sounds: Wheezing and rhonchi present. No rales.   Abdominal:      General: Abdomen is flat. Bowel sounds are normal. There is distension (obese).      Palpations: Abdomen is soft. There is no mass.      Tenderness: There is no abdominal tenderness. There is no guarding or rebound.   Musculoskeletal:         General: Swelling and tenderness present.      Cervical back: Normal range of motion.      Right lower leg: Edema present.      Left lower leg: Edema present.       "Comments: B/l venous stasis changes   Skin:     General: Skin is warm and dry.      Capillary Refill: Capillary refill takes less than 2 seconds.      Coloration: Skin is not jaundiced.   Neurological:      General: No focal deficit present.      Mental Status: She is alert and oriented to person, place, and time.      Cranial Nerves: No cranial nerve deficit.       Fluids    Intake/Output Summary (Last 24 hours) at 11/9/2022 1732  Last data filed at 11/9/2022 0507  Gross per 24 hour   Intake 340 ml   Output 500 ml   Net -160 ml         Laboratory  Recent Labs     11/07/22  0058 11/08/22  0205 11/09/22  0839   WBC 10.0 6.9 6.4   RBC 4.45 4.55 4.87   HEMOGLOBIN 11.7* 12.0 12.9   HEMATOCRIT 39.6 40.4 42.7   MCV 89.0 88.8 87.7   MCH 26.3* 26.4* 26.5*   MCHC 29.5* 29.7* 30.2*   RDW 58.6* 59.0* 57.8*   PLATELETCT 216 205 184   MPV 11.4 11.4 10.3         Imaging  DX-CHEST-PORTABLE (1 VIEW)   Final Result      1.  Mild interstitial edema.. Multifocal pneumonia could have a similar appearance.   2.  Airspace disease at the peripheral left base.             Assessment/Plan  * Shortness of breath + Somnolence- (present on admission)  Assessment & Plan  Hypercapnia - CO2 currently 31  Suspect KATHIA w/ hypercapnia  11/6 pCO2: 53  Does not have a CPAP at home.    PLAN  -CPAP ordered  -patient alert/oriented x 4   -Moderate oropharyngeal dysphagia on FEES; home health speech recommended  -symbicort  -duonebs prn  -Continue 2 more doses of azithromycin    Morbid obesity with alveolar hypoventilation (HCC)- (present on admission)  Assessment & Plan  Body mass index is 50.18 kg/m².  Needs weight loss and outpatient polysomnogram  Last 10/22 echo: Unable to estimate right ventricular systolic pressure   due to an inadequate tricuspid regurgitant jet.  2/2022 Echo: RVSP:35mmHg  Dr Gallego's Pulmonary note from 10/23/22 : \"KATHIA dx in 2018 /hr and was previously on  AVAPs titration with final pressure EPAP: 11 cm of water, IPAP: " "24/15 cm of water, volume: 450 mL, rate of 14, and 3 L oxygen bleed in. She is an exsmoker but she does not have evidence of COPD\"    PLAN  -CPAP ordered  -symbicort  -duonebs prn    Stasis dermatitis of both legs  Assessment & Plan  -lasix 20 BID  -compression socks ordered    Hyperglycemia  Assessment & Plan  9/2022 A1c:5.7  Monitor glucose  Needs weight loss  Pre diabetic    Oropharyngeal dysphagia  Assessment & Plan  Minced and moist diet with mild thick liquids  Speech recommends home health speech    Elevated troponin  Assessment & Plan  Hypoxia? Demand ischemia likely  Trending down on follow up.    Insomnia  Assessment & Plan  Continue home trazodone    ?GERD  Assessment & Plan  Continue home famotidine    HLD   Assessment & Plan  Active management with Lipitor 20 mg at bedtime    Tobacco use- (present on admission)  Assessment & Plan  Encourage cessation    ?possible Schizophrenia vs Bipolar?- (present on admission)  Assessment & Plan  Continue home Abilify         VTE prophylaxis: enoxaparin ppx    I have performed a physical exam and reviewed and updated ROS and Plan today (11/9/2022). In review of yesterday's note (11/8/2022), there are no changes except as documented above.        "

## 2022-11-12 ENCOUNTER — HOSPITAL ENCOUNTER (EMERGENCY)
Facility: MEDICAL CENTER | Age: 47
End: 2022-11-13
Attending: EMERGENCY MEDICINE
Payer: MEDICAID

## 2022-11-12 DIAGNOSIS — E66.2 OBESITY HYPOVENTILATION SYNDROME (HCC): ICD-10-CM

## 2022-11-12 DIAGNOSIS — J45.41 MODERATE PERSISTENT ASTHMA WITH EXACERBATION: ICD-10-CM

## 2022-11-12 DIAGNOSIS — J18.9 PNEUMONIA OF LEFT LUNG DUE TO INFECTIOUS ORGANISM, UNSPECIFIED PART OF LUNG: ICD-10-CM

## 2022-11-12 DIAGNOSIS — K21.9 GASTROESOPHAGEAL REFLUX DISEASE, UNSPECIFIED WHETHER ESOPHAGITIS PRESENT: ICD-10-CM

## 2022-11-12 DIAGNOSIS — R05.1 ACUTE COUGH: ICD-10-CM

## 2022-11-12 DIAGNOSIS — I50.32 CHRONIC DIASTOLIC HEART FAILURE (HCC): ICD-10-CM

## 2022-11-12 PROCEDURE — 99284 EMERGENCY DEPT VISIT MOD MDM: CPT

## 2022-11-12 ASSESSMENT — FIBROSIS 4 INDEX: FIB4 SCORE: 0.96

## 2022-11-13 ENCOUNTER — PHARMACY VISIT (OUTPATIENT)
Dept: PHARMACY | Facility: MEDICAL CENTER | Age: 47
End: 2022-11-13
Payer: COMMERCIAL

## 2022-11-13 ENCOUNTER — APPOINTMENT (OUTPATIENT)
Dept: RADIOLOGY | Facility: MEDICAL CENTER | Age: 47
End: 2022-11-13
Attending: EMERGENCY MEDICINE
Payer: MEDICAID

## 2022-11-13 VITALS
DIASTOLIC BLOOD PRESSURE: 72 MMHG | WEIGHT: 284.39 LBS | HEIGHT: 63 IN | HEART RATE: 84 BPM | TEMPERATURE: 98 F | SYSTOLIC BLOOD PRESSURE: 160 MMHG | OXYGEN SATURATION: 96 % | RESPIRATION RATE: 20 BRPM | BODY MASS INDEX: 50.39 KG/M2

## 2022-11-13 PROCEDURE — 71046 X-RAY EXAM CHEST 2 VIEWS: CPT

## 2022-11-13 PROCEDURE — 700102 HCHG RX REV CODE 250 W/ 637 OVERRIDE(OP): Performed by: EMERGENCY MEDICINE

## 2022-11-13 PROCEDURE — RXMED WILLOW AMBULATORY MEDICATION CHARGE: Performed by: EMERGENCY MEDICINE

## 2022-11-13 PROCEDURE — 94640 AIRWAY INHALATION TREATMENT: CPT

## 2022-11-13 PROCEDURE — 700101 HCHG RX REV CODE 250: Performed by: EMERGENCY MEDICINE

## 2022-11-13 PROCEDURE — A9270 NON-COVERED ITEM OR SERVICE: HCPCS | Performed by: EMERGENCY MEDICINE

## 2022-11-13 RX ORDER — BENZONATATE 100 MG/1
100 CAPSULE ORAL 3 TIMES DAILY PRN
Qty: 60 CAPSULE | Refills: 0 | Status: SHIPPED | OUTPATIENT
Start: 2022-11-13 | End: 2023-02-13

## 2022-11-13 RX ORDER — BENZONATATE 100 MG/1
100 CAPSULE ORAL 3 TIMES DAILY PRN
Qty: 60 CAPSULE | Refills: 0 | Status: SHIPPED | OUTPATIENT
Start: 2022-11-13 | End: 2022-11-13 | Stop reason: SDUPTHER

## 2022-11-13 RX ORDER — IPRATROPIUM BROMIDE AND ALBUTEROL SULFATE 2.5; .5 MG/3ML; MG/3ML
3 SOLUTION RESPIRATORY (INHALATION)
Status: COMPLETED | OUTPATIENT
Start: 2022-11-13 | End: 2022-11-13

## 2022-11-13 RX ORDER — FLUTICASONE PROPIONATE AND SALMETEROL 100; 50 UG/1; UG/1
1 POWDER RESPIRATORY (INHALATION) EVERY 12 HOURS
Qty: 60 EACH | Refills: 1 | Status: SHIPPED | OUTPATIENT
Start: 2022-11-13 | End: 2022-11-13 | Stop reason: SDUPTHER

## 2022-11-13 RX ORDER — IPRATROPIUM BROMIDE AND ALBUTEROL SULFATE 2.5; .5 MG/3ML; MG/3ML
SOLUTION RESPIRATORY (INHALATION)
Status: DISCONTINUED
Start: 2022-11-13 | End: 2022-11-13 | Stop reason: HOSPADM

## 2022-11-13 RX ORDER — FUROSEMIDE 20 MG/1
20 TABLET ORAL DAILY
Qty: 30 TABLET | Refills: 0 | Status: SHIPPED | OUTPATIENT
Start: 2022-11-13 | End: 2022-11-13 | Stop reason: SDUPTHER

## 2022-11-13 RX ORDER — POTASSIUM CHLORIDE 750 MG/1
10 TABLET, EXTENDED RELEASE ORAL DAILY
Qty: 30 TABLET | Refills: 0 | Status: SHIPPED | OUTPATIENT
Start: 2022-11-13 | End: 2022-11-13 | Stop reason: SDUPTHER

## 2022-11-13 RX ORDER — FUROSEMIDE 20 MG/1
20 TABLET ORAL DAILY
Qty: 90 TABLET | Refills: 0 | Status: SHIPPED | OUTPATIENT
Start: 2022-11-13 | End: 2022-12-01 | Stop reason: SDUPTHER

## 2022-11-13 RX ORDER — LEVOFLOXACIN 750 MG/1
750 TABLET, FILM COATED ORAL DAILY
Qty: 6 TABLET | Refills: 0 | Status: SHIPPED | OUTPATIENT
Start: 2022-11-14 | End: 2022-11-20

## 2022-11-13 RX ORDER — FAMOTIDINE 20 MG/1
20 TABLET, FILM COATED ORAL 2 TIMES DAILY
Qty: 60 TABLET | Refills: 0 | Status: SHIPPED | OUTPATIENT
Start: 2022-11-13 | End: 2022-11-13 | Stop reason: SDUPTHER

## 2022-11-13 RX ORDER — POTASSIUM CHLORIDE 750 MG/1
10 TABLET, EXTENDED RELEASE ORAL DAILY
Qty: 30 TABLET | Refills: 0 | Status: SHIPPED | OUTPATIENT
Start: 2022-11-13 | End: 2022-12-01 | Stop reason: SDUPTHER

## 2022-11-13 RX ORDER — LEVOFLOXACIN 750 MG/1
750 TABLET, FILM COATED ORAL ONCE
Status: COMPLETED | OUTPATIENT
Start: 2022-11-13 | End: 2022-11-13

## 2022-11-13 RX ORDER — LEVOFLOXACIN 750 MG/1
750 TABLET, FILM COATED ORAL DAILY
Qty: 6 TABLET | Refills: 0 | Status: SHIPPED | OUTPATIENT
Start: 2022-11-14 | End: 2022-11-13 | Stop reason: SDUPTHER

## 2022-11-13 RX ORDER — FAMOTIDINE 20 MG/1
20 TABLET, FILM COATED ORAL 2 TIMES DAILY
Qty: 60 TABLET | Refills: 0 | Status: SHIPPED | OUTPATIENT
Start: 2022-11-13 | End: 2022-12-26 | Stop reason: SDUPTHER

## 2022-11-13 RX ORDER — MONTELUKAST SODIUM 10 MG/1
10 TABLET ORAL EVERY EVENING
Qty: 30 TABLET | Refills: 0 | Status: SHIPPED | OUTPATIENT
Start: 2022-11-13 | End: 2022-11-13 | Stop reason: SDUPTHER

## 2022-11-13 RX ORDER — MONTELUKAST SODIUM 10 MG/1
10 TABLET ORAL EVERY EVENING
Qty: 30 TABLET | Refills: 0 | Status: SHIPPED | OUTPATIENT
Start: 2022-11-13 | End: 2022-12-01 | Stop reason: SDUPTHER

## 2022-11-13 RX ORDER — SENNOSIDES A AND B 8.6 MG/1
8.6 TABLET, FILM COATED ORAL
Qty: 30 TABLET | Refills: 0 | Status: SHIPPED | OUTPATIENT
Start: 2022-11-13 | End: 2022-11-13 | Stop reason: SDUPTHER

## 2022-11-13 RX ORDER — ALBUTEROL SULFATE 90 UG/1
2 AEROSOL, METERED RESPIRATORY (INHALATION) EVERY 6 HOURS PRN
Qty: 18 G | Refills: 1 | Status: SHIPPED | OUTPATIENT
Start: 2022-11-13 | End: 2022-11-13 | Stop reason: SDUPTHER

## 2022-11-13 RX ORDER — FLUTICASONE PROPIONATE AND SALMETEROL 100; 50 UG/1; UG/1
1 POWDER RESPIRATORY (INHALATION) EVERY 12 HOURS
Qty: 60 EACH | Refills: 1 | Status: SHIPPED | OUTPATIENT
Start: 2022-11-13 | End: 2023-01-26 | Stop reason: SDUPTHER

## 2022-11-13 RX ORDER — ALBUTEROL SULFATE 90 UG/1
2 AEROSOL, METERED RESPIRATORY (INHALATION) EVERY 6 HOURS PRN
Qty: 18 G | Refills: 1 | Status: SHIPPED | OUTPATIENT
Start: 2022-11-13 | End: 2022-12-01 | Stop reason: SDUPTHER

## 2022-11-13 RX ORDER — SENNOSIDES A AND B 8.6 MG/1
8.6 TABLET, FILM COATED ORAL
Qty: 30 TABLET | Refills: 0 | Status: SHIPPED | OUTPATIENT
Start: 2022-11-13 | End: 2023-08-01

## 2022-11-13 RX ORDER — IPRATROPIUM BROMIDE AND ALBUTEROL SULFATE 2.5; .5 MG/3ML; MG/3ML
3 SOLUTION RESPIRATORY (INHALATION)
Status: DISCONTINUED | OUTPATIENT
Start: 2022-11-13 | End: 2022-11-13

## 2022-11-13 RX ADMIN — LEVOFLOXACIN 750 MG: 750 TABLET, FILM COATED ORAL at 02:11

## 2022-11-13 RX ADMIN — IPRATROPIUM BROMIDE AND ALBUTEROL SULFATE 3 ML: .5; 2.5 SOLUTION RESPIRATORY (INHALATION) at 01:07

## 2022-11-13 NOTE — ED NOTES
Pt ambulated to room with steady gait with personal walker. Placed on monitor. Call light in reach. Provided blankets for comfort. Up for ERP to see.

## 2022-11-13 NOTE — ED PROVIDER NOTES
"ED Provider Note    Means of Arrival: EMS  History obtained from: patient    CHIEF COMPLAINT  Chief Complaint   Patient presents with    Flu Like Symptoms    Cough     PT reports increased cough and flu like symptoms over past 2 days.         HPI  Lorene Haro is a 47-year-old female with past medical history of COPD, on home oxygen at 2 L nasal cannula, KATHIA, asthma, schizophrenia, tobacco use recently discharged 11/10/2022 for COPD exacerbation treated with IV steroids and antibiotics presenting after she tried to swallow a chip and had worsening cough today.  Patient had a speech eval and swallow study due to dysphagia.  She was told to eat only soft foods.  She states that she was not able to get to the grocery store today to get soft foods and try to eat chips.  When she tried to swallow, she tells me that it \"went down the wrong pipe\" and she started coughing.  She denies vomiting, chest pain, or sensation of foreign body in her throat currently.  She states that the cough that she has is the same since she was discharged.  She has been unable to continue her antibiotics or get her breathing treatments because she has not been able to get transportation to the pharmacy.  She was supposed to be continued on azithromycin to complete a 3-day course.    Patient presented 11/5/2022 with similar complaint of cough unable to fill her prescriptions.    REVIEW OF SYSTEMS  CONSTITUTIONAL:  No fever.  CARDIOVASCULAR:  No chest discomfort.  RESPIRATORY:  productive cough  GASTROINTESTINAL:  No abdominal pain.  GENITOURINARY:   No dysuria.  MUSCULOSKELETAL:  No arthralgia.  SKIN:  No rash   NEUROLOGIC:   No headache.    See HPI for further details.   All other systems are negative.     PAST MEDICAL HISTORY  Past Medical History:   Diagnosis Date    A-fib (HCC)     Asthma     Bipolar 2 disorder (HCC)     Chickenpox     Hypertension     On home oxygen therapy     Other psychological stress     Schizophrenic disorder " "([facility])     Yeast infection of the skin 04/01/2021       FAMILY HISTORY  Family History   Problem Relation Age of Onset    No Known Problems Mother     Cancer Sister        SOCIAL HISTORY   reports that she quit smoking about 4 weeks ago. Her smoking use included cigarettes. She smoked an average of .25 packs per day. She has never used smokeless tobacco. She reports that she does not drink alcohol and does not use drugs.    SURGICAL HISTORY  Past Surgical History:   Procedure Laterality Date    CHOLECYSTECTOMY      COLECTOMY      HYSTERECTOMY LAPAROSCOPY      KNEE ARTHROSCOPY Left        CURRENT MEDICATIONS  Home Medications    **Home medications have not yet been reviewed for this encounter**         ALLERGIES  Allergies   Allergen Reactions    Penicillin G Rash and Itching     pt reports that she gets a rash all over her body and gets itchy    Amoxicillin Rash and Itching     Tolerates cephalosporins, pt reports that she gets a rash all over her body and gets itchy       PHYSICAL EXAM  VITAL SIGNS: BP (!) 143/66   Pulse 73   Temp 36.7 °C (98 °F) (Temporal)   Resp 20   Ht 1.6 m (5' 2.99\")   Wt (!) 129 kg (284 lb 6.3 oz)   LMP  (LMP Unknown)   SpO2 97%   BMI 50.39 kg/m²    Physical Exam  Vitals and nursing note reviewed.   Constitutional:       General: She is not in acute distress.     Appearance: Normal appearance.      Comments: Obese habitus   HENT:      Head: Normocephalic and atraumatic.      Nose: Nose normal.      Mouth/Throat:      Mouth: Mucous membranes are moist.   Eyes:      Extraocular Movements: Extraocular movements intact.      Conjunctiva/sclera: Conjunctivae normal.      Pupils: Pupils are equal, round, and reactive to light.   Cardiovascular:      Rate and Rhythm: Normal rate and regular rhythm.   Pulmonary:      Effort: Pulmonary effort is normal.      Breath sounds: No stridor. Wheezing (diffuse and bilateral) present. No rhonchi or rales.   Abdominal:      General: Abdomen is flat. "   Musculoskeletal:         General: No swelling or tenderness. Normal range of motion.      Cervical back: Normal range of motion and neck supple.      Right lower leg: No edema.      Left lower leg: No edema.   Skin:     General: Skin is warm and dry.   Neurological:      General: No focal deficit present.      Mental Status: She is alert and oriented to person, place, and time.           RADIOLOGY/PROCEDURES  DX-CHEST-2 VIEWS   Final Result         Hazy left basilar opacities, worse than prior, concerning for infection          LABS  Labs Reviewed - No data to display     EKG  Results for orders placed or performed during the hospital encounter of 22   EKG   Result Value Ref Range    Report       [facility]    Test Date:  2022  Pt Name:    ADELINA MILLAN                Department: ER  MRN:        [id number]                      Room:       S170  Gender:     Female                       Technician: [facility]  :        [id number]                   Requested By:SYLWIA REMY  Order #:    [id number]                    Reading MD: Cirilo Luna MD    Measurements  Intervals                                Axis  Rate:       80                           P:          44  MT:         117                          QRS:        58  QRSD:       98                           T:          14  QT:         360  QTc:        416    Interpretive Statements  SINUS RHYTHM  BORDERLINE SHORT MT INTERVAL  PROBABLE LEFT ATRIAL ENLARGEMENT  LOW VOLTAGE, PRECORDIAL LEADS  RSR' IN V1 OR V2, PROBABLY NORMAL VARIANT  Compared to ECG 10/24/2022 20:34:25  Right ventricular hypertrophy no longer present  T-wave abnormality no longer present  ST (T wave) deviation no longer present  Electronically Signed O n 2022 12:27:44 PST by Cirilo Luna MD     EKG   Result Value Ref Range    Report       [facility]    Test Date:  2022  Pt Name:    ADELINA YANA                Department: [facility]  MRN:        [id number]                       Room:       S170  Gender:     Female                       Technician: MONA  :        [id number]                   Requested By:DAVIN HILL  Order #:    [id number]                    Reading MD: Jl Mora MD    Measurements  Intervals                                Axis  Rate:       70                           P:          44  IA:         112                          QRS:        62  QRSD:       102                          T:          3  QT:         363  QTc:        392    Interpretive Statements  Sinus rhythm  Atrial premature complexes  Borderline short IA interval  Probable left atrial enlargement  RSR' in V1 or V2, probably normal variant, consider right VCD or RVH  Electronically Signed On 2022 8:04:50 PST by Jl Mora MD          COURSE & MEDICAL DECISION MAKING  Pertinent Labs & Imaging studies reviewed. (See chart for details)    Lorene Haro is a 47-year-old female with past medical history of COPD, on home oxygen at 2 L nasal cannula, KATHIA, asthma, schizophrenia, tobacco use recently discharged 11/10/2022 for COPD exacerbation treated with IV steroids and antibiotics presenting after she tried to swallow a chip and had worsening cough today.  Differential includes: Pneumonia, aspiration, retained foreign body, CHF, COPD, medication noncompliance, PE.    Patient states that her cough has not worsened or improved since her discharge.  Her main complaint today was that she ate a chip and then started coughing more after the chip with some nausea.  She denies any vomiting.  She states she also has not been able to  her medications because she has not been able to get any transportation.  Exam as above with wheezing bilaterally, given DuoNeb.  Vital signs have remained stable, oxygen in the high 90s on room air, patient states she is supposed to be on 2 L nasal cannula.  Chest x-ray shows possible worsening infiltrate in the left lower lobe.  Discussed with pharmacy  antibiotic to choose as patient was recently on ceftriaxone and azithromycin.  We will try a course of levofloxacin.  I have also sent all of patient's medications to the [facility] pharmacy so that the patient can just walk out of the ER to the pharmacy and  her prescriptions.  Social work also did stop by and inform the patient that she can get transportation through Medicaid.  She gave her information on how to organize this.  Patient is comfortable to plan with discharge in the morning, she will  her prescriptions here at the [facility].  She will also follow-up with her primary care provider or return if symptoms worsen.    Appropriate PPE were worn at this encounter.     FINAL IMPRESSION  1. Moderate persistent asthma with exacerbation    2. Gastroesophageal reflux disease, unspecified whether esophagitis present    3. Obesity hypoventilation syndrome (HCC)    4. Chronic diastolic heart failure (HCC)    5. Acute cough    6. Pneumonia of left lung due to infectious organism, unspecified part of lung      The patient will return for new or worsening symptoms and is stable at the time of discharge.    The patient is referred to a primary physician for blood pressure management, diabetic screening, and for all other preventative health concerns.    DISPOSITION:  Patient will be discharged home in stable condition.    FOLLOW UP:  Heriberto Sharp M.D.  [address] [place] [id number] [phone]      follow up in the next 1-2 weeks for your COPD and shortness of breath    [facility], Emergency Dept  [address] [facility] [address] [phone]    As needed, If symptoms worsen    OUTPATIENT MEDICATIONS:  New Prescriptions    LEVOFLOXACIN (LEVAQUIN) 750 MG TABLET    Take 1 Tablet by mouth every day for 6 days.          This dictation was created using voice recognition software. The accuracy of the dictation is limited to the abilities of the software. I  expect there may be some errors of grammar and possibly content. The nursing notes were reviewed and certain aspects of this information were incorporated into this note.

## 2022-11-13 NOTE — ED NOTES
Pt agreed to walk with walker to [facility] pharmacy to  9 scripts. Pt has MTM and was told to call when ready to go home. Pt ambulatory with walker, has all personal belongings, and knows to follow up with md talbert. Pt refused to sign DC paperwork.

## 2022-11-13 NOTE — ED TRIAGE NOTES
Chief Complaint   Patient presents with    Flu Like Symptoms    Cough     PT reports increased cough and flu like symptoms over past 2 days.

## 2022-11-13 NOTE — DISCHARGE PLANNING
SW attempted to met with the patient on two different occassions to discuss discharge plan. The patient was not available either time. KETTY was informed by the medical team that the patient had taken her oxygen off and stated that she will walk to the pharmacy to  her meds. Patient had left with no oxygen, she informed the nurse that she does not have anyone that can deliver her O2 tank to her. Patient left prior to this SW being able to contact [facility] to arrange for a tank to be delivered to the room. KETTY called the [facility] and asked for her medications to be transferred from the [facility] to the [facility].

## 2022-11-16 ENCOUNTER — TELEPHONE (OUTPATIENT)
Dept: HEALTH INFORMATION MANAGEMENT | Facility: OTHER | Age: 47
End: 2022-11-16
Payer: MEDICAID

## 2022-11-18 ENCOUNTER — HOSPITAL ENCOUNTER (EMERGENCY)
Facility: MEDICAL CENTER | Age: 47
End: 2022-11-18
Attending: EMERGENCY MEDICINE
Payer: MEDICAID

## 2022-11-18 VITALS
DIASTOLIC BLOOD PRESSURE: 58 MMHG | HEART RATE: 81 BPM | TEMPERATURE: 97 F | OXYGEN SATURATION: 90 % | BODY MASS INDEX: 52.26 KG/M2 | RESPIRATION RATE: 20 BRPM | WEIGHT: 284 LBS | HEIGHT: 62 IN | SYSTOLIC BLOOD PRESSURE: 127 MMHG

## 2022-11-18 DIAGNOSIS — J44.9 CHRONIC OBSTRUCTIVE PULMONARY DISEASE, UNSPECIFIED COPD TYPE (HCC): ICD-10-CM

## 2022-11-18 DIAGNOSIS — J04.0 LARYNGITIS: ICD-10-CM

## 2022-11-18 PROCEDURE — 99284 EMERGENCY DEPT VISIT MOD MDM: CPT

## 2022-11-18 PROCEDURE — 700111 HCHG RX REV CODE 636 W/ 250 OVERRIDE (IP): Performed by: EMERGENCY MEDICINE

## 2022-11-18 PROCEDURE — A9270 NON-COVERED ITEM OR SERVICE: HCPCS | Performed by: EMERGENCY MEDICINE

## 2022-11-18 PROCEDURE — 96372 THER/PROPH/DIAG INJ SC/IM: CPT

## 2022-11-18 PROCEDURE — 94640 AIRWAY INHALATION TREATMENT: CPT

## 2022-11-18 PROCEDURE — 700102 HCHG RX REV CODE 250 W/ 637 OVERRIDE(OP): Performed by: EMERGENCY MEDICINE

## 2022-11-18 PROCEDURE — 700101 HCHG RX REV CODE 250: Performed by: EMERGENCY MEDICINE

## 2022-11-18 RX ORDER — DEXAMETHASONE SODIUM PHOSPHATE 4 MG/ML
8 INJECTION, SOLUTION INTRA-ARTICULAR; INTRALESIONAL; INTRAMUSCULAR; INTRAVENOUS; SOFT TISSUE ONCE
Status: COMPLETED | OUTPATIENT
Start: 2022-11-18 | End: 2022-11-18

## 2022-11-18 RX ORDER — IPRATROPIUM BROMIDE AND ALBUTEROL SULFATE 2.5; .5 MG/3ML; MG/3ML
3 SOLUTION RESPIRATORY (INHALATION) ONCE
Status: COMPLETED | OUTPATIENT
Start: 2022-11-18 | End: 2022-11-18

## 2022-11-18 RX ORDER — IBUPROFEN 200 MG
400 TABLET ORAL ONCE
Status: COMPLETED | OUTPATIENT
Start: 2022-11-18 | End: 2022-11-18

## 2022-11-18 RX ADMIN — IBUPROFEN 400 MG: 200 TABLET, FILM COATED ORAL at 02:31

## 2022-11-18 RX ADMIN — DEXAMETHASONE SODIUM PHOSPHATE 8 MG: 4 INJECTION, SOLUTION INTRA-ARTICULAR; INTRALESIONAL; INTRAMUSCULAR; INTRAVENOUS; SOFT TISSUE at 02:31

## 2022-11-18 RX ADMIN — IPRATROPIUM BROMIDE AND ALBUTEROL SULFATE 3 ML: .5; 2.5 SOLUTION RESPIRATORY (INHALATION) at 03:22

## 2022-11-18 ASSESSMENT — ENCOUNTER SYMPTOMS: SORE THROAT: 1

## 2022-11-18 ASSESSMENT — FIBROSIS 4 INDEX: FIB4 SCORE: 0.96

## 2022-11-18 NOTE — ED NOTES
Patient provided discharge education. Denies any further questions but is requesting help with a ride.

## 2022-11-18 NOTE — ED PROVIDER NOTES
"ED Provider Note    Scribed for NIEVES Velazquez II* by Alex Patel. 2022  2:09 AM    Means of Arrival: EMS  History obtained by: Patient  Limitations: None noted    CHIEF COMPLAINT  Chief Complaint   Patient presents with    Sore Throat     BIBA from [facility] for sore throat   States she was singing karaoke and developed a sore throat afterwards   Pt denies any other symptoms   Ambulatory to triage with walker       HPI  Lorenesamina Haro is a 47 y.o. female with a history of COPD who presents to the Emergency Department via EMS for evaluation of sore throat onset earlier tonight after singing karaoke. She describes singing 1 song prior to the onset of her symptoms. She denies any other pain. She has \"a little bit\" of diabetes, but she does not take any medication for it.  No alleviating or exacerbating factors were noted.     She has history of asthma, atrial fibrillation.  Multiple ER visits for dyspnea.    She denies any recent illness.  No chest pain.  She does feel short of breath.  She has not had any facial swelling.  No rash.    REVIEW OF SYSTEMS  Review of Systems   HENT:  Positive for sore throat.    All other systems reviewed and are negative.  See HPI for further details.     PAST MEDICAL HISTORY   has a past medical history of A-fib ([facility]), Asthma, Bipolar 2 disorder (HCC), Chickenpox, Hypertension, On home oxygen therapy, Other psychological stress, Schizophrenic disorder ([facility]), and Yeast infection of the skin (2021).    SOCIAL HISTORY  Social History     Tobacco Use    Smoking status: Former     Packs/day: 0.25     Types: Cigarettes     Quit date: 10/12/2022     Years since quittin.1    Smokeless tobacco: Never   Vaping Use    Vaping Use: Never used   Substance and Sexual Activity    Alcohol use: Never    Drug use: Never    Sexual activity: Not Currently       SURGICAL HISTORY   has a past surgical history that includes hysterectomy laparoscopy; colectomy; cholecystectomy; and " "knee arthroscopy (Left).    CURRENT MEDICATIONS  Home Medications       Reviewed by Neptali Frost R.N. (Registered Nurse) on 11/18/22 at 0130  Med List Status: Not Addressed     Medication Last Dose Status   albuterol 108 (90 Base) MCG/ACT Aero Soln inhalation aerosol  Active   aripiprazole (ABILIFY) 30 MG tablet  Active   atorvastatin (LIPITOR) 20 MG Tab  Active   benzonatate (TESSALON) 100 MG Cap  Active   famotidine (PEPCID) 20 MG Tab  Active   fluticasone-salmeterol (ADVAIR) 100-50 MCG/ACT AEROSOL POWDER, BREATH ACTIVATED  Active   furosemide (LASIX) 20 MG Tab  Active   levoFLOXacin (LEVAQUIN) 750 MG tablet  Active   montelukast (SINGULAIR) 10 MG Tab  Active   potassium chloride SA (K-DUR) 10 MEQ Tab CR  Active   sennosides (SENOKOT) 8.6 MG Tab  Active   traZODone (DESYREL) 50 MG Tab  Active                    ALLERGIES  Allergies   Allergen Reactions    Penicillin G Rash and Itching     pt reports that she gets a rash all over her body and gets itchy    Amoxicillin Rash and Itching     Tolerates cephalosporins, pt reports that she gets a rash all over her body and gets itchy       PHYSICAL EXAM  VITAL SIGNS: /58   Pulse 81   Temp 36.2 °C (97.2 °F) (Temporal)   Resp 18   Ht 1.575 m (5' 2\")   Wt (!) 129 kg (284 lb)   LMP  (LMP Unknown)   SpO2 93%   BMI 51.94 kg/m²     Pulse ox interpretation: I interpret this pulse ox as normal.  Constitutional:  Morbidly Obese 47 year old female sitting upright in bed.  HENT: Normocephalic, Atraumatic, Bilateral external ears normal. Nose normal.  No oral edema.  Hoarse voice.  Not muffled.  Neck: Supple, no stridor.  No JVD.  Heart: Regular rate and rhythm, no murmurs.  Trace pretibial edema.  Lungs: Normal respiratory effort Clear to auscultation bilaterally. No signs of airway occlusion.  Abdomen: Soft and nontender.  Skin: Warm, Dry, No erythema, No rash. No open wounds.   Neurologic: Alert, Grossly non-focal.  Speech is clear.  MSK: Trace pretibial " edema.  Psychiatric:  Appears appropriate and not intoxicated.     COURSE & MEDICAL DECISION MAKING  Pertinent Labs & Imaging studies reviewed. (See chart for details)    2:09 AM This is an emergent evaluation of a 47 y.o., female with COPD who presents with sore throat and the differential diagnosis includes but is not limited to Laryngitis from forceful karaoke singing. No stridor or signs of airway occlusion, and her airway is patent. Doubt COPD exacerbation. Patient will be treated with Decadron 8 mg injection and Motrin 400 mg PO for her sore throat.    3:17 AM  Voice more prominent now. Some scant wheezing. Will give duoneb treatment, anticipate discharge.     4:30 AM  Resting comfortably on ED bed.  No signs of distress.  Symptoms continue to improve.  Believe she is safe for discharge.  Anticipate she will continue to improve at home.  Likely singing may have exacerbated her chronic issues, COPD, developed some mild laryngitis.    The patient will return for worsening symptoms and is stable at the time of discharge. The patient verbalizes understanding and will comply. Guidance provided on appropriate use of medications.       DISPOSITION:  Patient will be discharged home in stable condition.    FOLLOW UP:  Heriberto Sharp M.D.  [address] [place] [id number] [phone]    Schedule an appointment as soon as possible for a visit   As needed    [facility], Emergency Dept  [address] [place] [id number] [phone]    If symptoms worsen    OUTPATIENT MEDICATIONS:  New Prescriptions    No medications on file        FINAL IMPRESSION  1. Chronic obstructive pulmonary disease, unspecified COPD type (HCC)    2. Laryngitis           Alex HARRIS (Ade), am scribing for, and in the presence of, BRINDA Velazquez II.    Electronically signed by: Alex Patel (Ade), 11/18/2022    Barry HARRIS II, M* personally performed the services described in this  documentation, as scribed by Alex Patel in my presence, and it is both accurate and complete.    The note accurately reflects work and decisions made by me.  Barry Campbell II, M.D.  11/18/2022  4:31 AM

## 2022-11-18 NOTE — ED NOTES
Patient ambulated to purple 70 with a wide based gait and front wheel walker. SOB during ambulation, placed on monitor. O2 saturation at 94% on RA. Bed locked and in lowest position. Patient ambulated to and from the restroom with a steady gait. No distress noted at this time.

## 2022-11-18 NOTE — ED TRIAGE NOTES
"Chief Complaint   Patient presents with    Sore Throat     BIBA from [facility] for sore throat   States she was singing karaoke and developed a sore throat afterwards   Pt denies any other symptoms   Ambulatory to triage with walker       /67   Pulse 92   Temp 36.2 °C (97.2 °F) (Temporal)   Resp 18   Ht 1.575 m (5' 2\")   Wt (!) 129 kg (284 lb)   LMP  (LMP Unknown)   SpO2 94% Comment: Room air  BMI 51.94 kg/m²     Pt made aware of triage process, placed back into lobby, educated pt to tell staff of any worsening of symptoms     "

## 2022-11-18 NOTE — ED NOTES
Patient ambulated to the front lobby with a front wheel walker and all belongings. MTM to  patient.

## 2022-11-21 ENCOUNTER — NON-PROVIDER VISIT (OUTPATIENT)
Dept: CARDIOLOGY | Facility: MEDICAL CENTER | Age: 47
End: 2022-11-21
Payer: MEDICAID

## 2022-11-21 ENCOUNTER — TELEPHONE (OUTPATIENT)
Dept: CARDIOLOGY | Facility: MEDICAL CENTER | Age: 47
End: 2022-11-21
Payer: MEDICAID

## 2022-11-21 PROCEDURE — 93298 REM INTERROG DEV EVAL SCRMS: CPT | Performed by: INTERNAL MEDICINE

## 2022-11-21 NOTE — TELEPHONE ENCOUNTER
Patients device transmitted via home monitor-- patient had atrial fib episode 11/20 @ 6:15 pm lasting > 1 hour-- patient not on AC.    To be scanned

## 2022-11-22 ENCOUNTER — APPOINTMENT (OUTPATIENT)
Dept: RADIOLOGY | Facility: MEDICAL CENTER | Age: 47
End: 2022-11-22
Attending: EMERGENCY MEDICINE
Payer: MEDICAID

## 2022-11-22 ENCOUNTER — HOSPITAL ENCOUNTER (EMERGENCY)
Facility: MEDICAL CENTER | Age: 47
End: 2022-11-23
Attending: EMERGENCY MEDICINE
Payer: MEDICAID

## 2022-11-22 DIAGNOSIS — R11.2 NAUSEA AND VOMITING, UNSPECIFIED VOMITING TYPE: ICD-10-CM

## 2022-11-22 DIAGNOSIS — R10.11 RIGHT UPPER QUADRANT ABDOMINAL PAIN: ICD-10-CM

## 2022-11-22 LAB
ALBUMIN SERPL BCP-MCNC: 3.9 G/DL (ref 3.2–4.9)
ALBUMIN/GLOB SERPL: 1.5 G/DL
ALP SERPL-CCNC: 113 U/L (ref 30–99)
ALT SERPL-CCNC: 15 U/L (ref 2–50)
ANION GAP SERPL CALC-SCNC: 9 MMOL/L (ref 7–16)
AST SERPL-CCNC: 11 U/L (ref 12–45)
BASOPHILS # BLD AUTO: 0.2 % (ref 0–1.8)
BASOPHILS # BLD: 0.02 K/UL (ref 0–0.12)
BILIRUB SERPL-MCNC: 0.6 MG/DL (ref 0.1–1.5)
BUN SERPL-MCNC: 16 MG/DL (ref 8–22)
CALCIUM SERPL-MCNC: 9.3 MG/DL (ref 8.5–10.5)
CHLORIDE SERPL-SCNC: 104 MMOL/L (ref 96–112)
CO2 SERPL-SCNC: 30 MMOL/L (ref 20–33)
CREAT SERPL-MCNC: 1.1 MG/DL (ref 0.5–1.4)
EOSINOPHIL # BLD AUTO: 0.08 K/UL (ref 0–0.51)
EOSINOPHIL NFR BLD: 0.8 % (ref 0–6.9)
ERYTHROCYTE [DISTWIDTH] IN BLOOD BY AUTOMATED COUNT: 57.4 FL (ref 35.9–50)
GFR SERPLBLD CREATININE-BSD FMLA CKD-EPI: 62 ML/MIN/1.73 M 2
GLOBULIN SER CALC-MCNC: 2.6 G/DL (ref 1.9–3.5)
GLUCOSE SERPL-MCNC: 172 MG/DL (ref 65–99)
HCT VFR BLD AUTO: 40.3 % (ref 37–47)
HGB BLD-MCNC: 12.2 G/DL (ref 12–16)
IMM GRANULOCYTES # BLD AUTO: 0.04 K/UL (ref 0–0.11)
IMM GRANULOCYTES NFR BLD AUTO: 0.4 % (ref 0–0.9)
LIPASE SERPL-CCNC: 25 U/L (ref 11–82)
LYMPHOCYTES # BLD AUTO: 1.69 K/UL (ref 1–4.8)
LYMPHOCYTES NFR BLD: 16.2 % (ref 22–41)
MCH RBC QN AUTO: 26.7 PG (ref 27–33)
MCHC RBC AUTO-ENTMCNC: 30.3 G/DL (ref 33.6–35)
MCV RBC AUTO: 88.2 FL (ref 81.4–97.8)
MONOCYTES # BLD AUTO: 0.54 K/UL (ref 0–0.85)
MONOCYTES NFR BLD AUTO: 5.2 % (ref 0–13.4)
NEUTROPHILS # BLD AUTO: 8.07 K/UL (ref 2–7.15)
NEUTROPHILS NFR BLD: 77.2 % (ref 44–72)
NRBC # BLD AUTO: 0 K/UL
NRBC BLD-RTO: 0 /100 WBC
PLATELET # BLD AUTO: 223 K/UL (ref 164–446)
PMV BLD AUTO: 11.5 FL (ref 9–12.9)
POTASSIUM SERPL-SCNC: 3.9 MMOL/L (ref 3.6–5.5)
PROT SERPL-MCNC: 6.5 G/DL (ref 6–8.2)
RBC # BLD AUTO: 4.57 M/UL (ref 4.2–5.4)
SODIUM SERPL-SCNC: 143 MMOL/L (ref 135–145)
WBC # BLD AUTO: 10.4 K/UL (ref 4.8–10.8)

## 2022-11-22 PROCEDURE — 80053 COMPREHEN METABOLIC PANEL: CPT

## 2022-11-22 PROCEDURE — 85025 COMPLETE CBC W/AUTO DIFF WBC: CPT

## 2022-11-22 PROCEDURE — 36415 COLL VENOUS BLD VENIPUNCTURE: CPT

## 2022-11-22 PROCEDURE — 99285 EMERGENCY DEPT VISIT HI MDM: CPT

## 2022-11-22 PROCEDURE — 83690 ASSAY OF LIPASE: CPT

## 2022-11-22 RX ORDER — MORPHINE SULFATE 4 MG/ML
4 INJECTION INTRAVENOUS ONCE
Status: COMPLETED | OUTPATIENT
Start: 2022-11-23 | End: 2022-11-23

## 2022-11-22 RX ORDER — SODIUM CHLORIDE 9 MG/ML
1000 INJECTION, SOLUTION INTRAVENOUS ONCE
Status: COMPLETED | OUTPATIENT
Start: 2022-11-23 | End: 2022-11-23

## 2022-11-22 RX ORDER — ONDANSETRON 2 MG/ML
4 INJECTION INTRAMUSCULAR; INTRAVENOUS ONCE
Status: COMPLETED | OUTPATIENT
Start: 2022-11-23 | End: 2022-11-23

## 2022-11-22 ASSESSMENT — FIBROSIS 4 INDEX: FIB4 SCORE: 0.96

## 2022-11-22 NOTE — TELEPHONE ENCOUNTER
Called patient home number and it went directly to SHUN HILTON to callback or attend appointment tomorrow with [facility].    all other ROS negative except as per HPI

## 2022-11-22 NOTE — CARDIAC REMOTE MONITOR - SCAN
Device transmission reviewed. Device demonstrated appropriate function.       Electronically Signed by: Nader Barraza M.D.    11/23/2022  10:11 AM

## 2022-11-23 ENCOUNTER — APPOINTMENT (OUTPATIENT)
Dept: SPEECH THERAPY | Facility: REHABILITATION | Age: 47
End: 2022-11-23
Attending: STUDENT IN AN ORGANIZED HEALTH CARE EDUCATION/TRAINING PROGRAM
Payer: MEDICAID

## 2022-11-23 ENCOUNTER — APPOINTMENT (OUTPATIENT)
Dept: RADIOLOGY | Facility: MEDICAL CENTER | Age: 47
End: 2022-11-23
Attending: EMERGENCY MEDICINE
Payer: MEDICAID

## 2022-11-23 VITALS
OXYGEN SATURATION: 96 % | SYSTOLIC BLOOD PRESSURE: 131 MMHG | RESPIRATION RATE: 16 BRPM | DIASTOLIC BLOOD PRESSURE: 65 MMHG | HEART RATE: 91 BPM | TEMPERATURE: 98.3 F | WEIGHT: 283.73 LBS | BODY MASS INDEX: 52.21 KG/M2 | HEIGHT: 62 IN

## 2022-11-23 LAB
APPEARANCE UR: CLEAR
BILIRUB UR QL STRIP.AUTO: NEGATIVE
COLOR UR: YELLOW
GLUCOSE UR STRIP.AUTO-MCNC: NEGATIVE MG/DL
KETONES UR STRIP.AUTO-MCNC: NEGATIVE MG/DL
LEUKOCYTE ESTERASE UR QL STRIP.AUTO: NEGATIVE
MICRO URNS: NORMAL
NITRITE UR QL STRIP.AUTO: NEGATIVE
PH UR STRIP.AUTO: 5.5 [PH] (ref 5–8)
PROT UR QL STRIP: NEGATIVE MG/DL
RBC UR QL AUTO: NEGATIVE
SP GR UR STRIP.AUTO: 1.02
UROBILINOGEN UR STRIP.AUTO-MCNC: 0.2 MG/DL

## 2022-11-23 PROCEDURE — 74177 CT ABD & PELVIS W/CONTRAST: CPT

## 2022-11-23 PROCEDURE — 700111 HCHG RX REV CODE 636 W/ 250 OVERRIDE (IP): Performed by: EMERGENCY MEDICINE

## 2022-11-23 PROCEDURE — 81003 URINALYSIS AUTO W/O SCOPE: CPT

## 2022-11-23 PROCEDURE — 700117 HCHG RX CONTRAST REV CODE 255: Performed by: EMERGENCY MEDICINE

## 2022-11-23 PROCEDURE — 700105 HCHG RX REV CODE 258: Performed by: EMERGENCY MEDICINE

## 2022-11-23 PROCEDURE — 96375 TX/PRO/DX INJ NEW DRUG ADDON: CPT

## 2022-11-23 PROCEDURE — 96374 THER/PROPH/DIAG INJ IV PUSH: CPT | Mod: XU

## 2022-11-23 RX ADMIN — SODIUM CHLORIDE 1000 ML: 9 INJECTION, SOLUTION INTRAVENOUS at 00:09

## 2022-11-23 RX ADMIN — ONDANSETRON 4 MG: 2 INJECTION INTRAMUSCULAR; INTRAVENOUS at 00:09

## 2022-11-23 RX ADMIN — IOHEXOL 100 ML: 350 INJECTION, SOLUTION INTRAVENOUS at 01:25

## 2022-11-23 RX ADMIN — MORPHINE SULFATE 4 MG: 4 INJECTION INTRAVENOUS at 00:09

## 2022-11-23 NOTE — ED TRIAGE NOTES
"Chief Complaint   Patient presents with    Abdominal Pain     Started 3 days ago, RUQ, cramping.  Last BM earlier today    N/V     +emesis per EMS, no blood       Pt wheeled to triage came in via EMS. Pt A&Ox4, here for above complaint. Patient on 2L O2 baseline per patient.  Was not wearing today because she was visiting her uncle.    Pt to lobby . Pt educated on alerting staff in changes to condition. Pt verbalized understanding. Protocol ordered.    Given 4mg zofran ODT by EMS PTA     BP (!) 142/85   Pulse 88   Temp 36.7 °C (98 °F) (Oral)   Resp 16   Ht 1.575 m (5' 2\")   Wt (!) 129 kg (283 lb 11.7 oz)   LMP  (LMP Unknown)   SpO2 97%   BMI 51.90 kg/m²     "

## 2022-11-23 NOTE — ED PROVIDER NOTES
ED Provider Note    CHIEF COMPLAINT  Chief Complaint   Patient presents with    Abdominal Pain     Started 3 days ago, RUQ, cramping.  Last BM earlier today    N/V     +emesis per EMS, no blood       HPI  Lorene Haro is a 47 y.o. female who presents for evaluation of right mid abdomen pain for 3 days.  Patient notes she has been nauseous and vomiting for this period of time as well.  She has a history of cholecystectomy in the past and has atrial fibrillation, and COPD.  She is normally on 2 L and has not had to go up on the oxygen at home.  She was seen recently for a COPD exacerbation however.  She has had no diarrhea or constipation and no dysuria or hematuria.    REVIEW OF SYSTEMS  Constitutional: No fevers or chills  Skin: No rashes  HEENT: No sore throat, runny nose  Chest: No pain   Pulm: No increasing shortness of breath, cough, wheezing, stridor, or pain with inspiration/expiration  Gastrointestinal: No  diarrhea, constipation, bloating, melena, or hematochezia   Genitourinary: No dysuria or hematuria  Musculoskeletal: No recent trauma, pain, swelling, weakness  Neurologic: No sensory or motor changes. No confusion or disorientation.  Heme: No bleeding or bruising problems.   Immuno: No hx of recurrent infections    PAST FAM HISTORY  Family History   Problem Relation Age of Onset    No Known Problems Mother     Cancer Sister        PAST MEDICAL HISTORY   has a past medical history of A-fib (HCC), Asthma, Bipolar 2 disorder (HCC), Chickenpox, Hypertension, On home oxygen therapy, Other psychological stress, Schizophrenic disorder (HCC), and Yeast infection of the skin (2021).    SOCIAL HISTORY  Social History     Tobacco Use    Smoking status: Former     Packs/day: 0.25     Types: Cigarettes     Quit date: 10/12/2022     Years since quittin.1    Smokeless tobacco: Never   Vaping Use    Vaping Use: Never used   Substance and Sexual Activity    Alcohol use: Never    Drug use: Never    Sexual  "activity: Not Currently       SURGICAL HISTORY   has a past surgical history that includes hysterectomy laparoscopy; colectomy; cholecystectomy; and knee arthroscopy (Left).    CURRENT MEDICATIONS  Home Medications       Reviewed by Ellen Frank R.N. (Registered Nurse) on 11/22/22 at 2158  Med List Status: Partial     Medication Last Dose Status   albuterol 108 (90 Base) MCG/ACT Aero Soln inhalation aerosol  Active   aripiprazole (ABILIFY) 30 MG tablet  Active   atorvastatin (LIPITOR) 20 MG Tab  Active   benzonatate (TESSALON) 100 MG Cap  Active   famotidine (PEPCID) 20 MG Tab  Active   fluticasone-salmeterol (ADVAIR) 100-50 MCG/ACT AEROSOL POWDER, BREATH ACTIVATED  Active   furosemide (LASIX) 20 MG Tab  Active   montelukast (SINGULAIR) 10 MG Tab  Active   potassium chloride SA (K-DUR) 10 MEQ Tab CR  Active   sennosides (SENOKOT) 8.6 MG Tab  Active   traZODone (DESYREL) 50 MG Tab  Active                    ALLERGIES  Allergies   Allergen Reactions    Penicillin G Rash and Itching     pt reports that she gets a rash all over her body and gets itchy    Amoxicillin Rash and Itching     Tolerates cephalosporins, pt reports that she gets a rash all over her body and gets itchy       PHYSICAL EXAM  VITAL SIGNS: /65   Pulse 91   Temp 36.8 °C (98.3 °F) (Temporal)   Resp 16   Ht 1.575 m (5' 2\")   Wt (!) 129 kg (283 lb 11.7 oz)   LMP  (LMP Unknown)   SpO2 96%   BMI 51.90 kg/m²    Gen: Appears tired, otherwise no apparent distress  HEENT: No signs of trauma, Bilateral external ears normal, Nose normal. Conjunctiva normal, Non-icteric.   Cardiovascular: Regular rate and rhythm, no murmurs.  Capillary refill less than 3 seconds to all extremities, 2+ distal pulses.  Thorax & Lungs: Normal breath sounds, No respiratory distress, No wheezing bilateral chest rise  Abdomen: Bowel sounds normal, Soft, diffuse right abdominal tenderness,  No Guarding or rebound  Skin: Warm, Dry  Extremities: Intact distal pulses, " brawny edema noted to both lower extremities.  There is blanching erythema circumferentially to the distal pretibial regions bilaterally.  Neurologic: Alert , no facial droop, grossly normal coordination and strength      LABS  Results for orders placed or performed during the hospital encounter of 11/22/22   CBC with Differential   Result Value Ref Range    WBC 10.4 4.8 - 10.8 K/uL    RBC 4.57 4.20 - 5.40 M/uL    Hemoglobin 12.2 12.0 - 16.0 g/dL    Hematocrit 40.3 37.0 - 47.0 %    MCV 88.2 81.4 - 97.8 fL    MCH 26.7 (L) 27.0 - 33.0 pg    MCHC 30.3 (L) 33.6 - 35.0 g/dL    RDW 57.4 (H) 35.9 - 50.0 fL    Platelet Count 223 164 - 446 K/uL    MPV 11.5 9.0 - 12.9 fL    Neutrophils-Polys 77.20 (H) 44.00 - 72.00 %    Lymphocytes 16.20 (L) 22.00 - 41.00 %    Monocytes 5.20 0.00 - 13.40 %    Eosinophils 0.80 0.00 - 6.90 %    Basophils 0.20 0.00 - 1.80 %    Immature Granulocytes 0.40 0.00 - 0.90 %    Nucleated RBC 0.00 /100 WBC    Neutrophils (Absolute) 8.07 (H) 2.00 - 7.15 K/uL    Lymphs (Absolute) 1.69 1.00 - 4.80 K/uL    Monos (Absolute) 0.54 0.00 - 0.85 K/uL    Eos (Absolute) 0.08 0.00 - 0.51 K/uL    Baso (Absolute) 0.02 0.00 - 0.12 K/uL    Immature Granulocytes (abs) 0.04 0.00 - 0.11 K/uL    NRBC (Absolute) 0.00 K/uL   Complete Metabolic Panel   Result Value Ref Range    Sodium 143 135 - 145 mmol/L    Potassium 3.9 3.6 - 5.5 mmol/L    Chloride 104 96 - 112 mmol/L    Co2 30 20 - 33 mmol/L    Anion Gap 9.0 7.0 - 16.0    Glucose 172 (H) 65 - 99 mg/dL    Bun 16 8 - 22 mg/dL    Creatinine 1.10 0.50 - 1.40 mg/dL    Calcium 9.3 8.5 - 10.5 mg/dL    AST(SGOT) 11 (L) 12 - 45 U/L    ALT(SGPT) 15 2 - 50 U/L    Alkaline Phosphatase 113 (H) 30 - 99 U/L    Total Bilirubin 0.6 0.1 - 1.5 mg/dL    Albumin 3.9 3.2 - 4.9 g/dL    Total Protein 6.5 6.0 - 8.2 g/dL    Globulin 2.6 1.9 - 3.5 g/dL    A-G Ratio 1.5 g/dL   Lipase   Result Value Ref Range    Lipase 25 11 - 82 U/L   Urinalysis    Specimen: Urine, Clean Catch   Result Value Ref Range     Color Yellow     Character Clear     Specific Gravity 1.025 <1.035    Ph 5.5 5.0 - 8.0    Glucose Negative Negative mg/dL    Ketones Negative Negative mg/dL    Protein Negative Negative mg/dL    Bilirubin Negative Negative    Urobilinogen, Urine 0.2 Negative    Nitrite Negative Negative    Leukocyte Esterase Negative Negative    Occult Blood Negative Negative    Micro Urine Req see below    ESTIMATED GFR   Result Value Ref Range    GFR (CKD-EPI) 62 >60 mL/min/1.73 m 2       RADIOLOGY  CT-ABDOMEN-PELVIS WITH   Final Result         1.  Hepatomegaly and irregular hepatic contour, compatible changes of cirrhosis            COURSE & MEDICAL DECISION MAKING  Patient arrives for evaluation of right abdominal pain for 3 days in the setting of vomiting.  She does not have a fever here and does not appear septic or toxic.  She does appear tired and has right mid abdomen tenderness.  Concern for appendicitis will warrant CT of the abdomen and pelvis with contrast.  Patient states understanding of this.  She will be given fluids, nausea, and pain medication IV.    2:45 AM  Patient CT is reassuring and she is noted to be sleeping soundly upon reevaluation.  She wakes easily and is in no distress.  She has no peritoneal signs and states understanding of the plan for symptomatic treatment at home.  She will follow-up with her primary physician regarding her hepatomegaly and possible cirrhosis and will be encouraged to return if her symptoms worsen or change in any way.  At this point I have no emergent cause as the source of her pain or vomiting and I feel, provided she can stay hydrated at home, she will recover uneventfully.    FINAL IMPRESSION  1. Right upper quadrant abdominal pain    2. Nausea and vomiting, unspecified vomiting type        Electronically signed by: Esteban Guillory M.D., 11/22/2022 11:48 PM

## 2022-11-23 NOTE — ED NOTES
Pt is discharged. VSS. Paperwork explained and all question answered; pt refused to sign discharge paperwork.  All belongings sent with Pt upon departure. Pt ambulatory with a steady gait out of ED.

## 2022-11-25 ENCOUNTER — APPOINTMENT (OUTPATIENT)
Dept: RADIOLOGY | Facility: MEDICAL CENTER | Age: 47
End: 2022-11-25
Attending: STUDENT IN AN ORGANIZED HEALTH CARE EDUCATION/TRAINING PROGRAM
Payer: MEDICAID

## 2022-11-25 ENCOUNTER — APPOINTMENT (OUTPATIENT)
Dept: RADIOLOGY | Facility: MEDICAL CENTER | Age: 47
End: 2022-11-25
Payer: MEDICAID

## 2022-11-25 ENCOUNTER — HOSPITAL ENCOUNTER (EMERGENCY)
Facility: MEDICAL CENTER | Age: 47
End: 2022-11-26
Attending: STUDENT IN AN ORGANIZED HEALTH CARE EDUCATION/TRAINING PROGRAM
Payer: MEDICAID

## 2022-11-25 DIAGNOSIS — J44.1 ACUTE EXACERBATION OF CHRONIC OBSTRUCTIVE PULMONARY DISEASE (COPD) (HCC): ICD-10-CM

## 2022-11-25 DIAGNOSIS — J06.9 UPPER RESPIRATORY TRACT INFECTION, UNSPECIFIED TYPE: ICD-10-CM

## 2022-11-25 LAB
ALBUMIN SERPL BCP-MCNC: 3.8 G/DL (ref 3.2–4.9)
ALBUMIN/GLOB SERPL: 1.3 G/DL
ALP SERPL-CCNC: 112 U/L (ref 30–99)
ALT SERPL-CCNC: 26 U/L (ref 2–50)
ANION GAP SERPL CALC-SCNC: 10 MMOL/L (ref 7–16)
AST SERPL-CCNC: 18 U/L (ref 12–45)
BASOPHILS # BLD AUTO: 0.3 % (ref 0–1.8)
BASOPHILS # BLD: 0.03 K/UL (ref 0–0.12)
BILIRUB SERPL-MCNC: 0.7 MG/DL (ref 0.1–1.5)
BUN SERPL-MCNC: 12 MG/DL (ref 8–22)
CALCIUM SERPL-MCNC: 9.3 MG/DL (ref 8.5–10.5)
CHLORIDE SERPL-SCNC: 104 MMOL/L (ref 96–112)
CO2 SERPL-SCNC: 28 MMOL/L (ref 20–33)
CREAT SERPL-MCNC: 1.14 MG/DL (ref 0.5–1.4)
EOSINOPHIL # BLD AUTO: 0.11 K/UL (ref 0–0.51)
EOSINOPHIL NFR BLD: 1.1 % (ref 0–6.9)
ERYTHROCYTE [DISTWIDTH] IN BLOOD BY AUTOMATED COUNT: 56.2 FL (ref 35.9–50)
FLUAV RNA SPEC QL NAA+PROBE: NEGATIVE
FLUBV RNA SPEC QL NAA+PROBE: NEGATIVE
GFR SERPLBLD CREATININE-BSD FMLA CKD-EPI: 59 ML/MIN/1.73 M 2
GLOBULIN SER CALC-MCNC: 3 G/DL (ref 1.9–3.5)
GLUCOSE SERPL-MCNC: 126 MG/DL (ref 65–99)
HCT VFR BLD AUTO: 38.3 % (ref 37–47)
HGB BLD-MCNC: 11.8 G/DL (ref 12–16)
IMM GRANULOCYTES # BLD AUTO: 0.04 K/UL (ref 0–0.11)
IMM GRANULOCYTES NFR BLD AUTO: 0.4 % (ref 0–0.9)
LYMPHOCYTES # BLD AUTO: 1.54 K/UL (ref 1–4.8)
LYMPHOCYTES NFR BLD: 16 % (ref 22–41)
MCH RBC QN AUTO: 27.1 PG (ref 27–33)
MCHC RBC AUTO-ENTMCNC: 30.8 G/DL (ref 33.6–35)
MCV RBC AUTO: 88 FL (ref 81.4–97.8)
MONOCYTES # BLD AUTO: 0.65 K/UL (ref 0–0.85)
MONOCYTES NFR BLD AUTO: 6.7 % (ref 0–13.4)
NEUTROPHILS # BLD AUTO: 7.26 K/UL (ref 2–7.15)
NEUTROPHILS NFR BLD: 75.5 % (ref 44–72)
NRBC # BLD AUTO: 0 K/UL
NRBC BLD-RTO: 0 /100 WBC
NT-PROBNP SERPL IA-MCNC: 435 PG/ML (ref 0–125)
PLATELET # BLD AUTO: 246 K/UL (ref 164–446)
PMV BLD AUTO: 11.7 FL (ref 9–12.9)
POTASSIUM SERPL-SCNC: 4.1 MMOL/L (ref 3.6–5.5)
PROT SERPL-MCNC: 6.8 G/DL (ref 6–8.2)
RBC # BLD AUTO: 4.35 M/UL (ref 4.2–5.4)
RSV RNA SPEC QL NAA+PROBE: NEGATIVE
SARS-COV-2 RNA RESP QL NAA+PROBE: NOTDETECTED
SODIUM SERPL-SCNC: 142 MMOL/L (ref 135–145)
SPECIMEN SOURCE: NORMAL
TROPONIN T SERPL-MCNC: 24 NG/L (ref 6–19)
WBC # BLD AUTO: 9.6 K/UL (ref 4.8–10.8)

## 2022-11-25 PROCEDURE — 85025 COMPLETE CBC W/AUTO DIFF WBC: CPT

## 2022-11-25 PROCEDURE — 700111 HCHG RX REV CODE 636 W/ 250 OVERRIDE (IP): Performed by: STUDENT IN AN ORGANIZED HEALTH CARE EDUCATION/TRAINING PROGRAM

## 2022-11-25 PROCEDURE — 93005 ELECTROCARDIOGRAM TRACING: CPT | Performed by: STUDENT IN AN ORGANIZED HEALTH CARE EDUCATION/TRAINING PROGRAM

## 2022-11-25 PROCEDURE — A9270 NON-COVERED ITEM OR SERVICE: HCPCS | Performed by: STUDENT IN AN ORGANIZED HEALTH CARE EDUCATION/TRAINING PROGRAM

## 2022-11-25 PROCEDURE — 36415 COLL VENOUS BLD VENIPUNCTURE: CPT

## 2022-11-25 PROCEDURE — 71045 X-RAY EXAM CHEST 1 VIEW: CPT

## 2022-11-25 PROCEDURE — 80053 COMPREHEN METABOLIC PANEL: CPT

## 2022-11-25 PROCEDURE — 700101 HCHG RX REV CODE 250: Performed by: STUDENT IN AN ORGANIZED HEALTH CARE EDUCATION/TRAINING PROGRAM

## 2022-11-25 PROCEDURE — 700102 HCHG RX REV CODE 250 W/ 637 OVERRIDE(OP): Performed by: STUDENT IN AN ORGANIZED HEALTH CARE EDUCATION/TRAINING PROGRAM

## 2022-11-25 PROCEDURE — 0241U HCHG SARS-COV-2 COVID-19 NFCT DS RESP RNA 4 TRGT MIC: CPT

## 2022-11-25 PROCEDURE — 84484 ASSAY OF TROPONIN QUANT: CPT

## 2022-11-25 PROCEDURE — C9803 HOPD COVID-19 SPEC COLLECT: HCPCS | Performed by: EMERGENCY MEDICINE

## 2022-11-25 PROCEDURE — 99284 EMERGENCY DEPT VISIT MOD MDM: CPT

## 2022-11-25 PROCEDURE — 83880 ASSAY OF NATRIURETIC PEPTIDE: CPT

## 2022-11-25 RX ORDER — KETOROLAC TROMETHAMINE 30 MG/ML
15 INJECTION, SOLUTION INTRAMUSCULAR; INTRAVENOUS ONCE
Status: DISCONTINUED | OUTPATIENT
Start: 2022-11-25 | End: 2022-11-26 | Stop reason: HOSPADM

## 2022-11-25 RX ORDER — METHOCARBAMOL 500 MG/1
1000 TABLET, FILM COATED ORAL ONCE
Status: COMPLETED | OUTPATIENT
Start: 2022-11-25 | End: 2022-11-25

## 2022-11-25 RX ORDER — OSELTAMIVIR PHOSPHATE 75 MG/1
75 CAPSULE ORAL ONCE
Status: COMPLETED | OUTPATIENT
Start: 2022-11-25 | End: 2022-11-26

## 2022-11-25 RX ORDER — ACETAMINOPHEN 500 MG
1000 TABLET ORAL ONCE
Status: COMPLETED | OUTPATIENT
Start: 2022-11-25 | End: 2022-11-25

## 2022-11-25 RX ADMIN — ALBUTEROL SULFATE 2.5 MG: 2.5 SOLUTION RESPIRATORY (INHALATION) at 22:39

## 2022-11-25 RX ADMIN — ACETAMINOPHEN 1000 MG: 500 TABLET ORAL at 22:40

## 2022-11-25 RX ADMIN — PREDNISONE 50 MG: 10 TABLET ORAL at 22:40

## 2022-11-25 RX ADMIN — METHOCARBAMOL 1000 MG: 500 TABLET ORAL at 22:41

## 2022-11-25 ASSESSMENT — ENCOUNTER SYMPTOMS
FALLS: 0
MYALGIAS: 1
LOSS OF CONSCIOUSNESS: 0
NECK PAIN: 0
SORE THROAT: 1
BLURRED VISION: 0
CHILLS: 1
ABDOMINAL PAIN: 0
WEIGHT LOSS: 0
VOMITING: 0
NAUSEA: 0
HEADACHES: 0
COUGH: 1
DOUBLE VISION: 0
SHORTNESS OF BREATH: 1

## 2022-11-25 ASSESSMENT — FIBROSIS 4 INDEX: FIB4 SCORE: 0.62

## 2022-11-26 VITALS
HEIGHT: 62 IN | TEMPERATURE: 98.4 F | OXYGEN SATURATION: 94 % | BODY MASS INDEX: 52.81 KG/M2 | WEIGHT: 287 LBS | DIASTOLIC BLOOD PRESSURE: 72 MMHG | SYSTOLIC BLOOD PRESSURE: 141 MMHG | RESPIRATION RATE: 15 BRPM | HEART RATE: 87 BPM

## 2022-11-26 LAB
EKG IMPRESSION: NORMAL
EKG IMPRESSION: NORMAL

## 2022-11-26 PROCEDURE — 700102 HCHG RX REV CODE 250 W/ 637 OVERRIDE(OP): Performed by: STUDENT IN AN ORGANIZED HEALTH CARE EDUCATION/TRAINING PROGRAM

## 2022-11-26 PROCEDURE — 93005 ELECTROCARDIOGRAM TRACING: CPT | Performed by: STUDENT IN AN ORGANIZED HEALTH CARE EDUCATION/TRAINING PROGRAM

## 2022-11-26 PROCEDURE — A9270 NON-COVERED ITEM OR SERVICE: HCPCS | Performed by: STUDENT IN AN ORGANIZED HEALTH CARE EDUCATION/TRAINING PROGRAM

## 2022-11-26 RX ORDER — PREDNISONE 10 MG/1
50 TABLET ORAL DAILY
Qty: 20 TABLET | Refills: 0 | Status: SHIPPED | OUTPATIENT
Start: 2022-11-26 | End: 2022-11-30

## 2022-11-26 RX ADMIN — OSELTAMIVIR PHOSPHATE 75 MG: 75 CAPSULE ORAL at 00:03

## 2022-11-26 NOTE — ED TRIAGE NOTES
"Chief Complaint   Patient presents with    Flu Like Symptoms     Productive cough and nausea x 3 days. Recent known exposure to covid.      Pt BIBA from home for above complaint. Pt on 2L O2 which is baseline. Pt to triage by WC. Pt educated to inform staff of any changes.     /66   Pulse 86   Temp 36.9 °C (98.5 °F) (Temporal)   Resp 17   Ht 1.575 m (5' 2\")   Wt (!) 130 kg (287 lb)   LMP  (LMP Unknown)   SpO2 97%   BMI 52.49 kg/m²     "

## 2022-11-26 NOTE — ED NOTES
Pt discharged in good condition and ambulates at baseline to  for comfort to lobby. No IV to remove and pt aware how to take Rx

## 2022-11-26 NOTE — ED PROVIDER NOTES
ED Provider Note    Chief Complaint:   Cough    HPI:  Lorene Haro is a very pleasant 47-year-old female with past medical history of COPD on 2 L home O2, atrial fibrillation, chronic leg swelling who presents with 2 days of productive cough, runny nose, sore throat, shortness of breath, bodies, chills.  Patient does have a sick contact who has COVID-19.  Patient reports she is vaccinated for COVID and flu.  Patient reports chest pain only with coughing.  Patient has not had increased oxygen requirements.  Patient reports worsening lower extremity swelling.  Patient denies pain in the lower extremities.    Review of Systems:  Review of Systems   Constitutional:  Positive for chills. Negative for weight loss.   HENT:  Positive for congestion and sore throat.    Eyes:  Negative for blurred vision and double vision.   Respiratory:  Positive for cough and shortness of breath.    Cardiovascular:  Positive for chest pain and leg swelling.   Gastrointestinal:  Negative for abdominal pain, nausea and vomiting.   Genitourinary:  Negative for dysuria and hematuria.   Musculoskeletal:  Positive for myalgias. Negative for falls and neck pain.   Skin:  Negative for itching and rash.   Neurological:  Negative for loss of consciousness and headaches.     Family History:  Family History   Problem Relation Age of Onset    No Known Problems Mother     Cancer Sister        Past Medical History:   has a past medical history of A-fib ([facility]), Asthma, Bipolar 2 disorder (HCC), Chickenpox, Hypertension, On home oxygen therapy, Other psychological stress, Schizophrenic disorder ([facility]), and Yeast infection of the skin (2021).    Social History:  Social History     Tobacco Use    Smoking status: Former     Packs/day: 0.25     Types: Cigarettes     Quit date: 10/12/2022     Years since quittin.1    Smokeless tobacco: Never   Vaping Use    Vaping Use: Never used   Substance and Sexual Activity    Alcohol use: Never    Drug use:  "Never    Sexual activity: Not Currently       Surgical History:   has a past surgical history that includes hysterectomy laparoscopy; colectomy; cholecystectomy; and knee arthroscopy (Left).    Allergies:  Allergies   Allergen Reactions    Penicillin G Rash and Itching     pt reports that she gets a rash all over her body and gets itchy    Amoxicillin Rash and Itching     Tolerates cephalosporins, pt reports that she gets a rash all over her body and gets itchy       Physical Exam:  Vital Signs: BP (!) 141/72   Pulse 85   Temp 36.9 °C (98.4 °F) (Temporal)   Resp (!) 22   Ht 1.575 m (5' 2\")   Wt (!) 130 kg (287 lb)   LMP  (LMP Unknown)   SpO2 94%   BMI 52.49 kg/m²   Physical Exam  Vitals and nursing note reviewed.   Constitutional:       Comments: Patient is lying in bed supine, pleasant, conversant, speaking in complete sentences   HENT:      Head: Normocephalic and atraumatic.   Eyes:      Extraocular Movements: Extraocular movements intact.      Conjunctiva/sclera: Conjunctivae normal.      Pupils: Pupils are equal, round, and reactive to light.   Cardiovascular:      Pulses: Normal pulses.      Comments: HR 86  Pulmonary:      Effort: Pulmonary effort is normal. No respiratory distress.   Abdominal:      Comments: Abdomen is soft, non-tender, non-distended, non-rigid, no rebound, guarding, masses, no McBurney's point tenderness, no peritoneal signs, negative Rovsing sign, negative Mullins sign.  No CVA tenderness to palpation. Benign abdomen.   Musculoskeletal:      Cervical back: Normal range of motion. No rigidity.      Comments: Bilateral lower extremity swelling, 1+, no pitting edema, erythematous but nontender   Skin:     General: Skin is warm and dry.      Capillary Refill: Capillary refill takes less than 2 seconds.   Neurological:      Mental Status: She is alert.       Medical records reviewed for continuity of care.     Results for orders placed or performed during the hospital encounter of " 11/25/22   CoV-2, FLU A/B, and RSV by PCR (2-4 Hours CEPHEID) : Collect NP swab in VTM    Specimen: Respirate   Result Value Ref Range    Influenza virus A RNA Negative Negative    Influenza virus B, PCR Negative Negative    RSV, PCR Negative Negative    SARS-CoV-2 by PCR NotDetected     SARS-CoV-2 Source NP Swab    CBC WITH DIFFERENTIAL   Result Value Ref Range    WBC 9.6 4.8 - 10.8 K/uL    RBC 4.35 4.20 - 5.40 M/uL    Hemoglobin 11.8 (L) 12.0 - 16.0 g/dL    Hematocrit 38.3 37.0 - 47.0 %    MCV 88.0 81.4 - 97.8 fL    MCH 27.1 27.0 - 33.0 pg    MCHC 30.8 (L) 33.6 - 35.0 g/dL    RDW 56.2 (H) 35.9 - 50.0 fL    Platelet Count 246 164 - 446 K/uL    MPV 11.7 9.0 - 12.9 fL    Neutrophils-Polys 75.50 (H) 44.00 - 72.00 %    Lymphocytes 16.00 (L) 22.00 - 41.00 %    Monocytes 6.70 0.00 - 13.40 %    Eosinophils 1.10 0.00 - 6.90 %    Basophils 0.30 0.00 - 1.80 %    Immature Granulocytes 0.40 0.00 - 0.90 %    Nucleated RBC 0.00 /100 WBC    Neutrophils (Absolute) 7.26 (H) 2.00 - 7.15 K/uL    Lymphs (Absolute) 1.54 1.00 - 4.80 K/uL    Monos (Absolute) 0.65 0.00 - 0.85 K/uL    Eos (Absolute) 0.11 0.00 - 0.51 K/uL    Baso (Absolute) 0.03 0.00 - 0.12 K/uL    Immature Granulocytes (abs) 0.04 0.00 - 0.11 K/uL    NRBC (Absolute) 0.00 K/uL   Comp Metabolic Panel   Result Value Ref Range    Sodium 142 135 - 145 mmol/L    Potassium 4.1 3.6 - 5.5 mmol/L    Chloride 104 96 - 112 mmol/L    Co2 28 20 - 33 mmol/L    Anion Gap 10.0 7.0 - 16.0    Glucose 126 (H) 65 - 99 mg/dL    Bun 12 8 - 22 mg/dL    Creatinine 1.14 0.50 - 1.40 mg/dL    Calcium 9.3 8.5 - 10.5 mg/dL    AST(SGOT) 18 12 - 45 U/L    ALT(SGPT) 26 2 - 50 U/L    Alkaline Phosphatase 112 (H) 30 - 99 U/L    Total Bilirubin 0.7 0.1 - 1.5 mg/dL    Albumin 3.8 3.2 - 4.9 g/dL    Total Protein 6.8 6.0 - 8.2 g/dL    Globulin 3.0 1.9 - 3.5 g/dL    A-G Ratio 1.3 g/dL   TROPONIN   Result Value Ref Range    Troponin T 24 (H) 6 - 19 ng/L   proBrain Natriuretic Peptide, NT   Result Value Ref Range     NT-proBNP 435 (H) 0 - 125 pg/mL   ESTIMATED GFR   Result Value Ref Range    GFR (CKD-EPI) 59 (A) >60 mL/min/1.73 m 2   EKG   Result Value Ref Range    Report       [facility] Emergency Dept.    Test Date:  2022  Pt Name:    ADELINA MILLAN                Department: ER  MRN:        [id number]                      Room:       [facility]  Gender:     Female                       Technician: [id number]  :        1975                   Requested By:OWEN LOONEY  Order #:    [id number]                    Reading MD: Owen Looney MD    Measurements  Intervals                                Axis  Rate:       84                           P:          78  UT:         123                          QRS:        57  QRSD:       94                           T:          10  QT:         343  QTc:        406    Interpretive Statements  Sinus rhythm  Low voltage, precordial leads  No ST elevations or ST depressions, no T wave inversions  QTC within normal limits  Normal axis  Electronically Signed On 2022 2:05:47 PST by Owen Looney MD     EKG   Result Value Ref Range    Report       [facility] Emergency Dept.    Test Date:  2022  Pt Name:    ADELINA MILLAN                Department: ER  MRN:        [id number]                      Room:       [facility]  Gender:     Female                       Technician: [id number]  :        1975                   Requested By:OWEN LOONEY  Order #:    [id number]                    Reading MD: Owen Looney MD    Measurements  Intervals                                Axis  Rate:       82                           P:          48  UT:         124                          QRS:        51  QRSD:       103                          T:          6  QT:         367  QTc:        429    Interpretive Statements  Sinus rhythm  No ST elevations or ST depressions, no T wave inversions no ST elevations or  ST  depressions, no T wave  inversions  Normal axis  QTC within normal limits  Electronically Signed On 11- 2:06:18 PST by Cirilo Looney MD         Radiology:  DX-CHEST-LIMITED (1 VIEW)   Final Result         1.  No new infiltrates or consolidations.      2.  Some decrease in left basilar opacifications.           MDM:  Chest x-ray demonstrates improved left basilar opacifications, no evidence of pulmonary edema, pneumonia, pneumothorax or other acute cardiopulmonary process.  Troponin is at baseline as well per patient history.  CMP demonstrates no evidence of acute kidney injury, acute electrolyte abnormality, acute liver failure, CBC demonstrates no evidence of acute anemia or leukocytosis.  Respiratory viral panel negative for flu, COVID, RSV.  Influenza dose was given initially due to concern for influenza and patient with COPD on oxygen.  No need to continue Tamiflu at this time.  Patient however does have a recent exposure to COVID-19, patient counseled to monitor her symptoms and return should she have worsening symptoms or increased oxygen requirements.  Patient resting comfortably at this time, wheezing has improved significantly following albuterol administration and steroids.  Disposition Home with steroid prescription.    Electronically signed by: Cirilo Lonoey M.D., 11/25/2022, 11:00 PM    Repeat physical exam benign.  Repeat EKG demonstrates no ischemic changes.  ACS is inconsistent with patient presentation at this time.  I doubt any serious emergency process at this time.  Patient and/or family, friends given strict return precautions and care instructions. They have demonstrated understanding of discharge instructions through teach back mechanism. Advised PCP follow-up in 1-2 days.  Patient/family/friend expresses understanding and agrees to plan.    This dictation has been created using voice recognition software. I am continuously working with the software to minimize the number of voice recognition  errors and I have made every attempt to manually correct the errors within my dictation. However errors  related to this voice recognition software may still exist and should be interpreted within the appropriate context.     Electronically signed by: Cirilo Looney M.D., 11/26/2022 2:09 AM      Disposition:  Home    Final Impression:  1. Upper respiratory tract infection, unspecified type    2. Acute exacerbation of chronic obstructive pulmonary disease (COPD) (HCC)        Electronically signed by: Cirilo Looney M.D., 11/26/2022 2:09 AM

## 2022-11-26 NOTE — ED NOTES
Pt sleeping. resps even and unlabored. Connected to monitor and call light on bed. No needs ientified

## 2022-11-27 ENCOUNTER — HOSPITAL ENCOUNTER (EMERGENCY)
Facility: MEDICAL CENTER | Age: 47
End: 2022-11-27
Attending: EMERGENCY MEDICINE
Payer: MEDICAID

## 2022-11-27 ENCOUNTER — APPOINTMENT (OUTPATIENT)
Dept: RADIOLOGY | Facility: MEDICAL CENTER | Age: 47
End: 2022-11-27
Attending: EMERGENCY MEDICINE
Payer: MEDICAID

## 2022-11-27 VITALS
WEIGHT: 280.65 LBS | HEART RATE: 70 BPM | DIASTOLIC BLOOD PRESSURE: 62 MMHG | BODY MASS INDEX: 51.65 KG/M2 | HEIGHT: 62 IN | TEMPERATURE: 97.4 F | OXYGEN SATURATION: 98 % | SYSTOLIC BLOOD PRESSURE: 105 MMHG | RESPIRATION RATE: 14 BRPM

## 2022-11-27 DIAGNOSIS — R05.3 CHRONIC COUGH: ICD-10-CM

## 2022-11-27 DIAGNOSIS — J42 CHRONIC BRONCHITIS, UNSPECIFIED CHRONIC BRONCHITIS TYPE (HCC): ICD-10-CM

## 2022-11-27 PROCEDURE — 99284 EMERGENCY DEPT VISIT MOD MDM: CPT | Mod: 25

## 2022-11-27 PROCEDURE — 71045 X-RAY EXAM CHEST 1 VIEW: CPT

## 2022-11-27 ASSESSMENT — FIBROSIS 4 INDEX: FIB4 SCORE: 0.7

## 2022-11-27 NOTE — Clinical Note
ERP reviewed DC instructions with pt. Pt understood and signed the DC paperwork. Pt ambulated with walker from ER room to the ER lobby with a steady gait.

## 2022-11-28 ENCOUNTER — HOSPITAL ENCOUNTER (EMERGENCY)
Facility: MEDICAL CENTER | Age: 47
End: 2022-11-28
Attending: EMERGENCY MEDICINE
Payer: MEDICAID

## 2022-11-28 ENCOUNTER — TELEPHONE (OUTPATIENT)
Dept: CARDIOLOGY | Facility: MEDICAL CENTER | Age: 47
End: 2022-11-28
Payer: MEDICAID

## 2022-11-28 ENCOUNTER — APPOINTMENT (OUTPATIENT)
Dept: RADIOLOGY | Facility: MEDICAL CENTER | Age: 47
End: 2022-11-28
Attending: EMERGENCY MEDICINE
Payer: MEDICAID

## 2022-11-28 VITALS
BODY MASS INDEX: 51.53 KG/M2 | OXYGEN SATURATION: 99 % | HEART RATE: 74 BPM | WEIGHT: 280 LBS | RESPIRATION RATE: 16 BRPM | HEIGHT: 62 IN | DIASTOLIC BLOOD PRESSURE: 57 MMHG | SYSTOLIC BLOOD PRESSURE: 116 MMHG | TEMPERATURE: 98.2 F

## 2022-11-28 DIAGNOSIS — S90.122A CONTUSION OF TOE OF LEFT FOOT, UNSPECIFIED TOE, INITIAL ENCOUNTER: ICD-10-CM

## 2022-11-28 PROCEDURE — A9270 NON-COVERED ITEM OR SERVICE: HCPCS | Performed by: EMERGENCY MEDICINE

## 2022-11-28 PROCEDURE — 700102 HCHG RX REV CODE 250 W/ 637 OVERRIDE(OP): Performed by: EMERGENCY MEDICINE

## 2022-11-28 PROCEDURE — 73630 X-RAY EXAM OF FOOT: CPT | Mod: LT

## 2022-11-28 PROCEDURE — 99284 EMERGENCY DEPT VISIT MOD MDM: CPT

## 2022-11-28 RX ORDER — CEPHALEXIN 500 MG/1
500 CAPSULE ORAL 4 TIMES DAILY
Qty: 28 CAPSULE | Refills: 0 | Status: SHIPPED | OUTPATIENT
Start: 2022-11-28 | End: 2022-12-05

## 2022-11-28 RX ORDER — ACETAMINOPHEN 325 MG/1
650 TABLET ORAL ONCE
Status: COMPLETED | OUTPATIENT
Start: 2022-11-28 | End: 2022-11-28

## 2022-11-28 RX ADMIN — ACETAMINOPHEN 650 MG: 325 TABLET, FILM COATED ORAL at 19:03

## 2022-11-28 ASSESSMENT — FIBROSIS 4 INDEX: FIB4 SCORE: 0.7

## 2022-11-28 NOTE — ED TRIAGE NOTES
"Chief Complaint   Patient presents with    Cough     Per EMS, the pt has had a productive cough for the last 2 weeks. The pt get a COVID test 2 days ago and has not received the results yet. Stable vitals on baseline 3l/min oxygen. Hx of COPD    Per pt, reports flu-like symptoms for last 2 weeks. The pt reports a cough for the last 2 weeks with yellow sputum. The pt reports unable to get comfortable at home and decided to call 911    Flu Like Symptoms       Pt wheelchair to triage. Pt A&Ox4, for the above complaint.     Pt to lobby . Pt educated on alerting staff in changes to condition. Pt verbalized understanding.     /77   Pulse 89   Temp 37.6 °C (99.6 °F) (Temporal)   Resp 18   Ht 1.575 m (5' 2\")   Wt (!) 127 kg (280 lb 10.3 oz)   LMP  (LMP Unknown)   SpO2 100%   BMI 51.33 kg/m²     "

## 2022-11-28 NOTE — ED PROVIDER NOTES
ED Provider Note        Primary care provider: Heriberto Sharp M.D.    I verified that the patient was wearing a mask and I was wearing appropriate PPE every time I entered the room. The patient's mask was on the patient at all times during my encounter except for a brief view of the oropharynx.      CHIEF COMPLAINT  Chief Complaint   Patient presents with    Cough     Per EMS, the pt has had a productive cough for the last 2 weeks. The pt get a COVID test 2 days ago and has not received the results yet. Stable vitals on baseline 3l/min oxygen. Hx of COPD    Per pt, reports flu-like symptoms for last 2 weeks. The pt reports a cough for the last 2 weeks with yellow sputum. The pt reports unable to get comfortable at home and decided to call 911    Flu Like Symptoms       HPI  Lorene Haro is a 47 y.o. female who presents to the Emergency Department with chief complaint of cough.  Patient is well-known to this department for multiple presentations for the same.  History of severe COPD and multiple recurrent pneumonias.  Patient reports that she was having some difficulty breathing and can get comfortable at home she is also concerned that she may have been exposed to influenza or COVID at her place of residence.  No fevers no chills the cough is nonproductive no pain in her chest no abdominal pain no other acute symptom change or concern.    REVIEW OF SYSTEMS  10 systems reviewed and otherwise negative, pertinent positives and negatives listed in the history of present illness.    PAST MEDICAL HISTORY   has a past medical history of A-fib (formerly [facility]), Asthma, Bipolar 2 disorder (formerly [facility]), Chickenpox, Hypertension, On home oxygen therapy, Other psychological stress, Schizophrenic disorder (formerly [facility]), and Yeast infection of the skin (04/01/2021).    SURGICAL HISTORY   has a past surgical history that includes hysterectomy laparoscopy; colectomy; cholecystectomy; and knee arthroscopy (Left).    SOCIAL HISTORY  Social History  "    Tobacco Use    Smoking status: Former     Packs/day: 0.25     Types: Cigarettes     Quit date: 10/12/2022     Years since quittin.1    Smokeless tobacco: Never   Vaping Use    Vaping Use: Never used   Substance Use Topics    Alcohol use: Never    Drug use: Never      Social History     Substance and Sexual Activity   Drug Use Never       FAMILY HISTORY  Non-Contributory    CURRENT MEDICATIONS  Home Medications       Reviewed by Jeremias Kimball R.N. (Registered Nurse) on 22 at 2010  Med List Status: Partial     Medication Last Dose Status   albuterol 108 (90 Base) MCG/ACT Aero Soln inhalation aerosol  Active   aripiprazole (ABILIFY) 30 MG tablet  Active   atorvastatin (LIPITOR) 20 MG Tab  Active   benzonatate (TESSALON) 100 MG Cap  Active   famotidine (PEPCID) 20 MG Tab  Active   fluticasone-salmeterol (ADVAIR) 100-50 MCG/ACT AEROSOL POWDER, BREATH ACTIVATED  Active   furosemide (LASIX) 20 MG Tab  Active   montelukast (SINGULAIR) 10 MG Tab  Active   potassium chloride SA (K-DUR) 10 MEQ Tab CR  Active   predniSONE (DELTASONE) 10 MG Tab  Active   sennosides (SENOKOT) 8.6 MG Tab  Active   traZODone (DESYREL) 50 MG Tab  Active                    ALLERGIES  Allergies   Allergen Reactions    Penicillin G Rash and Itching     pt reports that she gets a rash all over her body and gets itchy    Amoxicillin Rash and Itching     Tolerates cephalosporins, pt reports that she gets a rash all over her body and gets itchy       PHYSICAL EXAM  VITAL SIGNS: /77   Pulse 89   Temp 37.6 °C (99.6 °F) (Temporal)   Resp 18   Ht 1.575 m (5' 2\")   Wt (!) 127 kg (280 lb 10.3 oz)   LMP  (LMP Unknown)   SpO2 100%   BMI 51.33 kg/m²   Pulse ox interpretation: I interpret this pulse ox as normal.  Constitutional: Alert and oriented x 3, no acute distress  HEENT: Atraumatic normocephalic, pupils are equal round, extraocular movements are intact. The nares is clear, external ears are normal, mouth shows moist mucous " "membranes  Neck: no obvious JVD or tracheal deviation  Cardiovascular: Regular rate and rhythm no murmur rub or gallop   Thorax & Lungs: No respiratory distress, no wheezes rales or rhonchi, No chest tenderness.   GI:  morbid obesity soft nontender nondistended positive bowel sounds, no peritoneal signs  Skin: Warm dry no obvious acute rash or lesion  Musculoskeletal: Moving all extremities with normal range strength, no acute  deformity  Neurologic: Cranial nerves III through XII are grossly intact, no sensory deficit, no cerebellar dysfunction   Psychiatric: Appropriate affect for situation at this time      DIAGNOSTIC STUDIES / PROCEDURES  LABS      Labs EKG reviewed from visit 2 days prior negative for COVID flu influenza    All labs reviewed by me.      RADIOLOGY  DX-CHEST-PORTABLE (1 VIEW)   Final Result         1.  Pulmonary edema and/or infiltrates are identified, which are stable since the prior exam.            COURSE & MEDICAL DECISION MAKING  Pertinent Labs & Imaging studies reviewed. (See chart for details)    8:21 PM - Patient seen and examined at bedside.         Medical Decision Making: Chest x-ray shows no change of her previous patient stable on her 3 L nasal cannula she is in no respiratory distress her vital signs are otherwise unremarkable.  Patient is given instructions return for worsening cough chest pain shortness of breath any other acute symptom change or concern otherwise follow-up with primary care discharged in stable and improved condition.    /77   Pulse 89   Temp 37.6 °C (99.6 °F) (Temporal)   Resp 18   Ht 1.575 m (5' 2\")   Wt (!) 127 kg (280 lb 10.3 oz)   LMP  (LMP Unknown)   SpO2 100%   BMI 51.33 kg/m²     Heriberto Sharp M.D.  [address] [place] [id number] [phone]          [facility], Emergency Dept  [address] [facility] [address] [phone]    in 12-24 hours if symptoms persist, immediately If symptoms worsen, or if you " develop any other symptoms or concerns        FINAL IMPRESSION  1. Chronic cough Active   2. Chronic bronchitis, unspecified chronic bronchitis type (HCC) Active          This dictation has been created using voice recognition software and/or scribes. The accuracy of the dictation is limited by the abilities of the software and the expertise of the scribes. I expect there may be some errors of grammar and possibly content. I made every attempt to manually correct the errors within my dictation. However, errors related to voice recognition software and/or scribes may still exist and should be interpreted within the appropriate context.

## 2022-11-29 NOTE — ED NOTES
Pt wheeled back to tcs for eval from md. Pt on oxygen. Alerted pt this rn will get another and md will see pt. Pt c/o toe pain and is wrapped in kerlix

## 2022-11-29 NOTE — ED TRIAGE NOTES
Lorene Nicole Tahir  47 y.o.  female  Chief Complaint   Patient presents with    Toe Injury     Pt stubbed L great toe this afternoon, nail is cracked & was bleeding. Unsure of td status. Pt is diabetic. Can ambulate.     BIB [facility] from home. On 2L O2 at baseline, concentrator ran out on the way to ED. ED  aware (for discharge planning/O2 delivery).  Damp saline dressing applied to L great toe. Patient educated on triage process, to alert staff if any changes in condition.

## 2022-11-29 NOTE — DISCHARGE PLANNING
Medical Social Work    MSW received a call from bedside RN that pt is choosing to D/C home without her oxygen.  MSW contacted Torrey with [employer] to stop the oxygen delivery.

## 2022-11-29 NOTE — ED NOTES
Pt expressed discomfort in TCS. This RN moved pt to recliner leonora as well as placed pillows with joey under L foot. Pt additionally expressed that she was not happy with TCS placement. RN explained TCS, triage, acuity status, and apologized for accommodations. This RN spoke with pt, pt agreeable to plan of care at this time.     Pt to xray in recliner on o2 tank

## 2022-11-29 NOTE — DISCHARGE PLANNING
Medical Social Work    MSW received a call from bedside RN that pt is in need of oxygen to return home.  Pt's portable oxygen concentrator is out of oxygen.  MSW met with pt at bedside.  Pt states that she cannot remember her current oxygen company.  Pt was provided with the DME choice form and she pointed out [employer] as her possible current company.  Pt then states that she cannot walk due to stubbed toe.  Pt was advised that she does not qualify for [facility] non-emergent transport back home.  MSW contacted Torrey with [employer] regarding pt.  Torrey states that he's in [place] but can deliver tank to pt within an hour.  Bedside RN made aware.  Per notes pt was ambulatory per EMS and can call MTM for a ride home once oxygen is delivered.

## 2022-11-29 NOTE — ED NOTES
Warm blankets provided, put pts left foot on 2 pillows with a joey as it has dried blood on it.   tdap pending and explained this to pt.   Awaiting xrays

## 2022-11-29 NOTE — ED NOTES
Miguel A, ER tech to bedside for irrigation and gauze roll application to L great toe. Pt tolerated well.   Pt expresses desire to leave with no O2. This RN explained risks of leaving the ER without home O2, pt denied waiting for O2 delivery set up by [employer]. Pt continues to express desire to leave without O2. Pt ambulatory with home walker. Patient discharged from [facility] ED to home with self. Discharge teaching completed at bedside and patient signature obtained. All questions and concerns addressed. All pt belongings with pt at time of discharge.

## 2022-11-29 NOTE — ED PROVIDER NOTES
"CHIEF COMPLAINT  Chief Complaint   Patient presents with    Toe Injury     Pt stubbed L great toe this afternoon, nail is cracked & was bleeding. Unsure of td status. Pt is diabetic. Can ambulate.       HPI  Lorene Haro is a 47 y.o. female who presents complaining of left great toe injury.    Patient states she caught her toe on a rug at home today.  She noticed bleeding and complains of pain that is dull, achy, increased with ambulation.      ALLERGIES  Allergies   Allergen Reactions    Penicillin G Rash and Itching     pt reports that she gets a rash all over her body and gets itchy    Amoxicillin Rash and Itching     Tolerates cephalosporins, pt reports that she gets a rash all over her body and gets itchy       CURRENT MEDICATIONS  Albuterol, Pepcid, Advair, Lasix, Lipitor, Abilify    PAST MEDICAL HISTORY   has a past medical history of A-fib ([facility]), Asthma, Bipolar 2 disorder (HCC), Chickenpox, Hypertension, On home oxygen therapy, Other psychological stress, Schizophrenic disorder ([facility]), and Yeast infection of the skin (2021).    SURGICAL HISTORY   has a past surgical history that includes hysterectomy laparoscopy; colectomy; cholecystectomy; and knee arthroscopy (Left).    SOCIAL HISTORY  Social History     Tobacco Use    Smoking status: Former     Packs/day: 0.25     Types: Cigarettes     Quit date: 10/12/2022     Years since quittin.1    Smokeless tobacco: Never   Vaping Use    Vaping Use: Never used   Substance and Sexual Activity    Alcohol use: Never    Drug use: Never    Sexual activity: Not Currently       REVIEW OF SYSTEMS  Patient admits to left great toe pain and bleeding   pt denies other injuries  See HPI for further details.       PHYSICAL EXAM  VITAL SIGNS: BP (!) 140/84   Pulse 96   Temp 36.6 °C (97.8 °F) (Temporal)   Resp 17   Ht 1.575 m (5' 2\")   Wt (!) 127 kg (280 lb)   LMP  (LMP Unknown)   SpO2 92%   BMI 51.21 kg/m²     General:  WD female with elevated BMI, nontoxic " appearing in NAD; A+Ox3; V/S as above   Skin: warm and dry; good color; no rash  HEENT: NCAT; EOMs intact; no scleral icterus   Neck: FROM   Extremities: PERKINS x 4; erythema of the bilateral lower extremities with 1+ edema; toenails are thickened and long; left great toenail is intact with dried blood around the edges; no lacerations noted  Neurologic: CNs III-XII grossly intact; speech clear; distal sensation intact; strength 5/5 UE/LEs  Psychiatric: Appropriate affect, normal mood      IMAGING  DX-FOOT-COMPLETE 3+ LEFT   Final Result      No evidence of fracture.      Degenerative spurring.          MEDICAL RECORD  I have reviewed the patient's medical record and pertinent results as listed.      COURSE & MEDICAL DECISION MAKING  I have reviewed any medical record information, laboratory studies and radiographic results as noted.    Appropriate PPE was worn at all times while interacting with the patient.    Lorene Haro is a 47 y.o. female who presents complaining of left great toe injury after stubbing it today.  Unknown tetanus status.  The toe was thickened and difficult to assess for subungual hematoma but there is blood around the edges of the toe.  No laceration to repair.  X-ray obtained to assess for fracture.  Tetanus updated.    X-ray demonstrates no fracture.  The toe will be irrigated/cleaned and bandaged.  I have prescribed Keflex given the patient's diabetic history.  Return precautions were discussed.  She will follow-up for recheck with her PCP in 48 hours.    IMPRESSION  1. Contusion of toe of left foot, unspecified toe, initial encounter  cephALEXin (KEFLEX) 500 MG Cap        Electronically signed by: India Keenan M.D., 11/28/2022 6:35 PM      ED Provider Note

## 2022-11-29 NOTE — ED NOTES
ED RN contacted  regarding discharge. Pt states her portable O2 tank does not have any O2 in it, RN confirmed 0 psi in tank. Pt states she does not have any ride home, anyone to bring her a new tank, and is requesting [facility]. RN educated on MTM. Pt not agreeable to plan. RN requested Marielos, social work to bedside.   KETTY Arceo, at bedside

## 2022-11-29 NOTE — DISCHARGE INSTRUCTIONS
Your x-ray did not show a broken bone.  You have bruised your toe.    Take Keflex to prevent infection.      Keep your toe clean and dry.      Return to the ER for swelling, fever, increasing pain, or other concerns.    Follow-up with your primary care doctor for recheck in 48 hours.

## 2022-12-01 ENCOUNTER — HOSPITAL ENCOUNTER (EMERGENCY)
Facility: MEDICAL CENTER | Age: 47
End: 2022-12-01
Attending: EMERGENCY MEDICINE
Payer: MEDICAID

## 2022-12-01 VITALS
DIASTOLIC BLOOD PRESSURE: 66 MMHG | HEART RATE: 73 BPM | SYSTOLIC BLOOD PRESSURE: 128 MMHG | BODY MASS INDEX: 52.49 KG/M2 | WEIGHT: 287 LBS | OXYGEN SATURATION: 97 % | TEMPERATURE: 97.3 F | RESPIRATION RATE: 20 BRPM

## 2022-12-01 DIAGNOSIS — J45.41 MODERATE PERSISTENT ASTHMA WITH EXACERBATION: ICD-10-CM

## 2022-12-01 DIAGNOSIS — I50.32 CHRONIC DIASTOLIC HEART FAILURE (HCC): ICD-10-CM

## 2022-12-01 DIAGNOSIS — F20.3 UNDIFFERENTIATED SCHIZOPHRENIA (HCC): ICD-10-CM

## 2022-12-01 DIAGNOSIS — E66.01 CLASS 3 SEVERE OBESITY DUE TO EXCESS CALORIES IN ADULT, UNSPECIFIED BMI, UNSPECIFIED WHETHER SERIOUS COMORBIDITY PRESENT (HCC): ICD-10-CM

## 2022-12-01 DIAGNOSIS — R05.1 ACUTE COUGH: ICD-10-CM

## 2022-12-01 DIAGNOSIS — R06.2 WHEEZING: Primary | ICD-10-CM

## 2022-12-01 DIAGNOSIS — Z76.0 MEDICATION REFILL: ICD-10-CM

## 2022-12-01 PROCEDURE — 700101 HCHG RX REV CODE 250: Performed by: EMERGENCY MEDICINE

## 2022-12-01 PROCEDURE — 99284 EMERGENCY DEPT VISIT MOD MDM: CPT

## 2022-12-01 PROCEDURE — 700102 HCHG RX REV CODE 250 W/ 637 OVERRIDE(OP): Performed by: EMERGENCY MEDICINE

## 2022-12-01 PROCEDURE — 94640 AIRWAY INHALATION TREATMENT: CPT

## 2022-12-01 PROCEDURE — A9270 NON-COVERED ITEM OR SERVICE: HCPCS | Performed by: EMERGENCY MEDICINE

## 2022-12-01 RX ORDER — POTASSIUM CHLORIDE 750 MG/1
10 TABLET, EXTENDED RELEASE ORAL DAILY
Qty: 30 TABLET | Refills: 0 | Status: SHIPPED | OUTPATIENT
Start: 2022-12-01 | End: 2023-01-26

## 2022-12-01 RX ORDER — ATORVASTATIN CALCIUM 20 MG/1
20 TABLET, FILM COATED ORAL
Qty: 30 TABLET | Refills: 0 | Status: SHIPPED | OUTPATIENT
Start: 2022-12-01

## 2022-12-01 RX ORDER — MONTELUKAST SODIUM 10 MG/1
10 TABLET ORAL EVERY EVENING
Qty: 30 TABLET | Refills: 0 | Status: SHIPPED | OUTPATIENT
Start: 2022-12-01 | End: 2024-03-25

## 2022-12-01 RX ORDER — ARIPIPRAZOLE 10 MG/1
10 TABLET ORAL ONCE
Status: COMPLETED | OUTPATIENT
Start: 2022-12-01 | End: 2022-12-01

## 2022-12-01 RX ORDER — IPRATROPIUM BROMIDE AND ALBUTEROL SULFATE 2.5; .5 MG/3ML; MG/3ML
3 SOLUTION RESPIRATORY (INHALATION) ONCE
Status: COMPLETED | OUTPATIENT
Start: 2022-12-01 | End: 2022-12-01

## 2022-12-01 RX ORDER — ALBUTEROL SULFATE 90 UG/1
2 AEROSOL, METERED RESPIRATORY (INHALATION) EVERY 6 HOURS PRN
Qty: 18 G | Refills: 3 | Status: SHIPPED | OUTPATIENT
Start: 2022-12-01 | End: 2023-01-26 | Stop reason: SDUPTHER

## 2022-12-01 RX ORDER — FUROSEMIDE 20 MG/1
20 TABLET ORAL DAILY
Qty: 90 TABLET | Refills: 0 | Status: SHIPPED | OUTPATIENT
Start: 2022-12-01 | End: 2023-01-26 | Stop reason: SDUPTHER

## 2022-12-01 RX ORDER — ARIPIPRAZOLE 30 MG/1
30 TABLET ORAL EVERY MORNING
Qty: 30 TABLET | Refills: 0 | Status: SHIPPED | OUTPATIENT
Start: 2022-12-01

## 2022-12-01 RX ORDER — FUROSEMIDE 20 MG/1
20 TABLET ORAL ONCE
Status: COMPLETED | OUTPATIENT
Start: 2022-12-01 | End: 2022-12-01

## 2022-12-01 RX ADMIN — ARIPIPRAZOLE 10 MG: 10 TABLET ORAL at 18:31

## 2022-12-01 RX ADMIN — FUROSEMIDE 20 MG: 20 TABLET ORAL at 18:31

## 2022-12-01 RX ADMIN — IPRATROPIUM BROMIDE AND ALBUTEROL SULFATE 3 ML: .5; 2.5 SOLUTION RESPIRATORY (INHALATION) at 17:15

## 2022-12-01 ASSESSMENT — FIBROSIS 4 INDEX: FIB4 SCORE: 0.7

## 2022-12-02 NOTE — ED NOTES
Dressing placed to left foot  D/c pt home with group home staff. Multiple printed rx , rx given . Pt and staff aware of f/u instructions , aware to return for any changes or concerns. No further questions upon d/c home from ed

## 2022-12-02 NOTE — ED PROVIDER NOTES
ED Provider Note     2022  4:52 PM    Means of Arrival: Walk In  History obtained by: patient  Limitations: None  PCP: Heriberto Sharp  CODE STATUS: Full    CHIEF COMPLAINT  Chief Complaint   Patient presents with    Cough     X 3 weeks    Shortness of Breath     X 24 hours, c/o wheezing; given albuterol tx by EMS PTA        HPI  Lorene Haro is a 47 y.o. female who presents with concerns of shortness of breath.  This is a chronic symptom for her.  She endorsed having some wheezing earlier today.  She recently started living at a group home.  A representative from the group home is here with her.  Group home representative says that main reason for visit is that she needs refills of her medications.  When they picked her up yesterday she had no medications.  She denies any fever.  She has had a cough for the past several weeks.  Her last ER visit was for a left great toe injury.  She also has had multiple visits for dyspnea and most recent admission was on 2022 for asthma exacerbation and possible pneumonia.  Last x-ray on 2022 showed improvement.  She wears home oxygen and has been compliant with this.  She has access to it at the group home.    REVIEW OF SYSTEMS  Review of Systems   All other systems reviewed and are negative.  See HPI for further details.    PAST MEDICAL HISTORY   has a past medical history of A-fib ([facility]), Asthma, Bipolar 2 disorder ([facility]), Chickenpox, Hypertension, On home oxygen therapy, Other psychological stress, Schizophrenic disorder ([facility]), and Yeast infection of the skin (2021).    FAMILY HISTORY  Family History   Problem Relation Age of Onset    No Known Problems Mother     Cancer Sister        SOCIAL HISTORY  Social History     Tobacco Use    Smoking status: Former     Packs/day: 0.25     Types: Cigarettes     Quit date: 10/12/2022     Years since quittin.1    Smokeless tobacco: Never   Vaping Use    Vaping Use: Never used   Substance and Sexual Activity     Alcohol use: Never    Drug use: Never    Sexual activity: Not Currently       SURGICAL HISTORY   has a past surgical history that includes hysterectomy laparoscopy; colectomy; cholecystectomy; and knee arthroscopy (Left).    CURRENT MEDICATIONS  Home Medications       Reviewed by Sisi Amador R.N. (Registered Nurse) on 12/01/22 at 1615  Med List Status: Not Addressed     Medication Last Dose Status   albuterol 108 (90 Base) MCG/ACT Aero Soln inhalation aerosol  Active   aripiprazole (ABILIFY) 30 MG tablet  Active   atorvastatin (LIPITOR) 20 MG Tab  Active   benzonatate (TESSALON) 100 MG Cap  Active   cephALEXin (KEFLEX) 500 MG Cap  Active   famotidine (PEPCID) 20 MG Tab  Active   fluticasone-salmeterol (ADVAIR) 100-50 MCG/ACT AEROSOL POWDER, BREATH ACTIVATED  Active   furosemide (LASIX) 20 MG Tab  Active   montelukast (SINGULAIR) 10 MG Tab  Active   potassium chloride SA (K-DUR) 10 MEQ Tab CR  Active   sennosides (SENOKOT) 8.6 MG Tab  Active   traZODone (DESYREL) 50 MG Tab  Active                    ALLERGIES  Allergies   Allergen Reactions    Penicillin G Rash and Itching     pt reports that she gets a rash all over her body and gets itchy    Amoxicillin Rash and Itching     Tolerates cephalosporins, pt reports that she gets a rash all over her body and gets itchy       PHYSICAL EXAM  VITAL SIGNS: /66   Pulse 73   Temp 36.3 °C (97.3 °F)   Resp 20   Wt (!) 130 kg (287 lb)   LMP  (LMP Unknown)   SpO2 97%   BMI 52.49 kg/m²     Pulse ox interpretation: I interpret this pulse ox as normal.  Constitutional: Alert 47-year-old woman, in no distress, increased BMI body habitus.  I have seen her multiple times before and she appears to be at baseline.  HENT: No signs of trauma, Bilateral external ears normal, Nose normal.   Eyes: Pupils are equal, Conjunctiva normal, Non-icteric.   Neck: Normal range of motion, No tenderness, Supple, No stridor.   Lymphatic: No lymphadenopathy noted.   Cardiovascular:  Regular rate and rhythm, no murmurs. Symmetric distal pulses. No cyanosis of extremities. No peripheral edema of extremities.  Thorax & Lungs: Normal breath sounds, No respiratory distress, scant wheezing bilaterally, no chest tenderness.   Abdomen: Soft, No tenderness, No masses, No pulsatile masses. No peritoneal signs.  Skin: Warm, Dry, No erythema, No rash.   Back: No midline bony tenderness, No CVA tenderness.   Musculoskeletal: Good range of motion in all major joints. No tenderness to palpation or major deformities noted.   Neurologic: Alert , Normal motor function, Normal sensory function, No focal deficits noted.   Psychiatric: Affect normal, Judgment normal, Mood normal.   Physical Exam      DIAGNOSTIC STUDIES / PROCEDURES    LABS  Pertinent Labs & Imaging studies reviewed. (See chart for details)    RADIOLOGY  Pertinent Labs & Imaging studies reviewed. (See chart for details)    COURSE & MEDICAL DECISION MAKING  Pertinent Labs & Imaging studies reviewed. (See chart for details)    4:52 PM This is an emergent evaluation of a  47 y.o. female who presents with acute on chronic dyspnea.  Wheezes scant bilaterally.  Vitals otherwise within acceptable limits.  She is on her home oxygen level level and not requiring any extra oxygen.  We will give an additional DuoNeb treatment in plan to provide refills for her medications.    6:14 PM  Feeling better after DuoNeb treatment.  She will be given evening medications including Lasix prior to discharge.  Group home representative was provided with paper prescriptions so that they can go to the pharmacy together to fill these.  She does have a primary provider she can follow-up with.      The patient will return for worsening symptoms and is stable at the time of discharge. The patient verbalizes understanding. Guidance was provided on appropriate use of medications including driving under the influence, overdose, and side effects.         FINAL IMPRESSION     ICD-10-CM   1. Wheezing  R06.2   2. Moderate persistent asthma with exacerbation  J45.41   3. Chronic diastolic heart failure (HCC)  I50.32   4. Acute cough  R05.1   5. Medication refill  Z76.0   6. 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 Class 3 severe obesity due to excess calories in adult, unspecified BMI, unspecified whether serious comorbidity present ([facility])  E66.01            This dictation was created using voice recognition software. The accuracy of the dictation is limited to the abilities of the software. I expect there may be some errors of grammar and possibly content. The nursing notes were reviewed and certain aspects of this information were incorporated into this note.    Electronically signed by: Barry Campbell II, M.D., 12/1/2022 4:52 PM

## 2022-12-02 NOTE — ED TRIAGE NOTES
Chief Complaint   Patient presents with   • Cough     X 3 weeks   • Shortness of Breath     X 24 hours, c/o wheezing; given albuterol tx by EMS PTA      /64   Pulse 71   LMP  (LMP Unknown)   SpO2 100%     Pt GISSEL WHITE from home for above concern, pt on 2L at baseline; PIV placed and given breathing tx PTA    Pt has hx of COPD, CHF, Asthma

## 2022-12-05 ENCOUNTER — APPOINTMENT (OUTPATIENT)
Dept: SPEECH THERAPY | Facility: REHABILITATION | Age: 47
End: 2022-12-05
Attending: STUDENT IN AN ORGANIZED HEALTH CARE EDUCATION/TRAINING PROGRAM
Payer: MEDICAID

## 2022-12-06 ENCOUNTER — OFFICE VISIT (OUTPATIENT)
Dept: URGENT CARE | Facility: CLINIC | Age: 47
End: 2022-12-06
Payer: MEDICAID

## 2022-12-06 VITALS
SYSTOLIC BLOOD PRESSURE: 126 MMHG | RESPIRATION RATE: 19 BRPM | OXYGEN SATURATION: 94 % | HEIGHT: 62 IN | WEIGHT: 287 LBS | HEART RATE: 97 BPM | BODY MASS INDEX: 52.81 KG/M2 | DIASTOLIC BLOOD PRESSURE: 80 MMHG | TEMPERATURE: 97.5 F

## 2022-12-06 DIAGNOSIS — H66.003 NON-RECURRENT ACUTE SUPPURATIVE OTITIS MEDIA OF BOTH EARS WITHOUT SPONTANEOUS RUPTURE OF TYMPANIC MEMBRANES: ICD-10-CM

## 2022-12-06 PROCEDURE — 99213 OFFICE O/P EST LOW 20 MIN: CPT | Performed by: NURSE PRACTITIONER

## 2022-12-06 RX ORDER — DOXYCYCLINE HYCLATE 100 MG
100 TABLET ORAL 2 TIMES DAILY
Qty: 14 TABLET | Refills: 0 | Status: SHIPPED | OUTPATIENT
Start: 2022-12-06 | End: 2022-12-13

## 2022-12-06 ASSESSMENT — FIBROSIS 4 INDEX: FIB4 SCORE: 0.7

## 2022-12-06 ASSESSMENT — ENCOUNTER SYMPTOMS
EYE REDNESS: 0
SHORTNESS OF BREATH: 0
MYALGIAS: 0
CHILLS: 0
DIZZINESS: 0
VOMITING: 0
SORE THROAT: 0
NAUSEA: 0
FEVER: 0

## 2022-12-06 NOTE — PROGRESS NOTES
Subjective:   Lorene Haro is a 47 y.o. female who presents for Otalgia and Shortness of Breath      Otalgia   There is pain in both ears. This is a new problem. The current episode started in the past 7 days. The problem occurs constantly. The problem has been gradually worsening. The pain is at a severity of 7/10. The pain is moderate. Pertinent negatives include no ear discharge, rash, sore throat or vomiting. She has tried acetaminophen for the symptoms. The treatment provided no relief. There is no history of a chronic ear infection, hearing loss or a tympanostomy tube.     Review of Systems   Constitutional:  Negative for chills and fever.   HENT:  Positive for ear pain. Negative for ear discharge and sore throat.    Eyes:  Negative for redness.   Respiratory:  Negative for shortness of breath.    Cardiovascular:  Negative for chest pain.   Gastrointestinal:  Negative for nausea and vomiting.   Genitourinary:  Negative for dysuria.   Musculoskeletal:  Negative for myalgias.   Skin:  Negative for rash.   Neurological:  Negative for dizziness.     Medications:    albuterol Aers  aripiprazole  atorvastatin Tabs  benzonatate Caps  doxycycline Tabs  famotidine Tabs  fluticasone-salmeterol Aepb  furosemide Tabs  montelukast Tabs  potassium chloride SA Tbcr  sennosides Tabs  traZODone Tabs    Allergies: Penicillin g and Amoxicillin    Problem List: Lorene Haro does not have any pertinent problems on file.    Surgical History:  Past Surgical History:   Procedure Laterality Date    CHOLECYSTECTOMY      COLECTOMY      HYSTERECTOMY LAPAROSCOPY      KNEE ARTHROSCOPY Left        Past Social Hx: Lorene Haro  reports that she quit smoking about 7 weeks ago. Her smoking use included cigarettes. She smoked an average of .25 packs per day. She has never used smokeless tobacco. She reports that she does not drink alcohol and does not use drugs.     Past Family Hx:  Lorene Haro family history includes  "Cancer in her sister; No Known Problems in her mother.     Problem list, medications, and allergies reviewed by myself today in Epic.     Objective:     /80 (BP Location: Right arm, Patient Position: Sitting, BP Cuff Size: Adult long)   Pulse 97   Temp 36.4 °C (97.5 °F) (Temporal)   Resp 19   Ht 1.575 m (5' 2\")   Wt (!) 130 kg (287 lb)   LMP  (LMP Unknown)   SpO2 94%   BMI 52.49 kg/m²     Physical Exam  Vitals and nursing note reviewed.   Constitutional:       General: She is not in acute distress.     Appearance: She is well-developed.   HENT:      Head: Normocephalic and atraumatic.      Right Ear: External ear normal. A middle ear effusion is present. Tympanic membrane is erythematous and bulging.      Left Ear: External ear normal. A middle ear effusion is present. Tympanic membrane is erythematous and bulging.      Nose: Nose normal.      Mouth/Throat:      Mouth: Mucous membranes are moist.   Eyes:      Conjunctiva/sclera: Conjunctivae normal.   Cardiovascular:      Rate and Rhythm: Normal rate.   Pulmonary:      Effort: Pulmonary effort is normal. No respiratory distress.      Breath sounds: Normal breath sounds.   Abdominal:      General: There is no distension.   Musculoskeletal:         General: Normal range of motion.   Skin:     General: Skin is warm and dry.   Neurological:      General: No focal deficit present.      Mental Status: She is alert and oriented to person, place, and time. Mental status is at baseline.      Gait: Gait (gait at baseline) normal.   Psychiatric:         Judgment: Judgment normal.       Assessment/Plan:     Diagnosis and associated orders:     1. Non-recurrent acute suppurative otitis media of both ears without spontaneous rupture of tympanic membranes  doxycycline (VIBRAMYCIN) 100 MG Tab           Comments/MDM:     I personally reviewed prior external notes and prior test results pertinent to today's visit.   Discussed management options, risks and benefits, and " alternatives to treatment plan agreed upon.   Red flags discussed and indications to immediately call 911 or present to the Emergency Department.   Supportive care, differential diagnoses, and indications for immediate follow-up discussed with patient.    Patient expresses understanding and agrees to plan. Patient denies any other questions or concerns.                Please note that this dictation was created using voice recognition software. I have made a reasonable attempt to correct obvious errors, but I expect that there are errors of grammar and possibly content that I did not discover before finalizing the note.    This note was electronically signed by Nicola SHRESTHA.

## 2022-12-07 ENCOUNTER — SPEECH THERAPY (OUTPATIENT)
Dept: SPEECH THERAPY | Facility: REHABILITATION | Age: 47
End: 2022-12-07
Attending: STUDENT IN AN ORGANIZED HEALTH CARE EDUCATION/TRAINING PROGRAM
Payer: MEDICAID

## 2022-12-07 DIAGNOSIS — R13.12 OROPHARYNGEAL DYSPHAGIA: ICD-10-CM

## 2022-12-07 PROCEDURE — 92610 EVALUATE SWALLOWING FUNCTION: CPT

## 2022-12-07 ASSESSMENT — SOCIAL DETERMINANTS OF HEALTH (SDOH): SOCIAL_SUPPORT_SYSTEM: CAREGIVER

## 2022-12-07 NOTE — OP THERAPY EVALUATION
Outpatient Speech Therapy  INITIAL EVALUATION    [address]  [address]  [facility] [address]  Phone:  [phone]  Fax:  [phone]    Date of Evaluation: 12/07/2022    Patient: Lorene Haro  YOB: 1975  MRN: [id number]     Referring Provider: Cayetano Bloom M.D.  [address] [place] [address]   Referring Diagnosis Oropharyngeal dysphagia [R13.12]     Time Calculation    Start time: 1331  Stop time: 1408 Time Calculation (min): 37 minutes           Chief Complaint: Difficulty Swallowing    Visit Diagnoses     ICD-10-CM   1. Oropharyngeal dysphagia  R13.12     Subjective:   Reason for Therapy:     Reason For Evaluation:  Dysphagia  Social Support:     Accompanied By:  Caregiver    Patient Mental Status:  Alert and Responsive  Therapy History:     Current Diet:  Regular Diet and Thin Liquids    Nutritional Concerns Restrictions and Allergies:  Room Air      Dentition:  Missing Dentition and Poor Dental/Oral Hygiene  Additional Subjective Comments:      Patient is a 47 y.o. female with history of Asthma and multiple episodes of Pneumonia over the past year.  Patient presents with complaints of difficulty swallowing certain foods and coughing with liquids.  Patient reported that rice, meat and garlic bread were difficult to eat, and she prefers foods like yogurt and soup.  Patient reported that coughing with liquids happens every day, while feeling food stuck in her throat only happens sometimes.  Patient reported she tends to eat quickly and may not always chew as much as she should.  Patient feels she has had difficulty swallowing for the past 6 years.  Patient is currently living in a group home.  Patient has been referred to speech therapy for a clinical swallow evaluation.  Past Medical History:   Diagnosis Date    A-fib (HCC)     Asthma     Bipolar 2 disorder (HCC)     Chickenpox     Hypertension     On home oxygen therapy     Other  psychological stress     Schizophrenic disorder (HCC)     Yeast infection of the skin 04/01/2021     Past Surgical History:   Procedure Laterality Date    CHOLECYSTECTOMY      COLECTOMY      HYSTERECTOMY LAPAROSCOPY      KNEE ARTHROSCOPY Left      Objective:   Objective Details:  The [place] Swallow Protocol, Eating Assessment Tool (EAT-10), Swallowing Ability and Function Evaluation (SAFE) were administered to the patient with the following results:    The [place] Swallow requires the patient to drink 3 oz water in sequential swallows without stopping, with assessment of interrupted drinking and coughing or choking during or immediately after completion of drinking.    [place]: Pass, Patient was able to consecutively swallow 3 oz water with no pausing, coughing, or change in vocal quality      The Eating Assessment Tool (EAT-10) is a patient administered, self-assessment of possible difficulties that may be encountered as a result of difficulty in swallow.  Scores greater than 3 can be indicative of changes in swallow consistent with dysphagia.     EAT-10 score: 32/40     The Swallowing Ability and Function Evaluation is used to identify specific problems occurring during the oral and pharyngeal phases of swallowing.    SAFE:  (Subscale, Raw Score, [place], Severity)  Physical Evaluation 75;7  Mild  Oral Phase  19;7 Mild  Pharyngeal Phase  17;6  Mild        Speech Therapy Assessment:     Oral Motor Status:     Oral motor status comments: Oral Mechanism Exam:    -Visual Assessment: Symmetrical  -Dentition: Several broken/missing  -Labial: WFL   -Lingual: WFL   -Palatal Elevation: WFL   -Jaw:  WFL  -Laryngeal Elevation: Adequate   -Volitional Cough: adequate  - Circumlaryngeal Palpation: No pain or edema         Oral Phase Assessment:     Oral phase comments: Patient was observed during PO trials of 3 oz water, puree, and beau cracker.  Patient demonstrated adequate lip seal and retrieval of bolus from cup and spoon.  Large  bolus of applesauce retrieved with spoon.  Adequate oral prep with purees, prolonged mastication with cracker, using front teeth, and timely A/P transfer. No significant oral residuals or pocketing were observed. Multiple swallows were required to clear oral cavity with both applesauce and cracker. No report of nasal regurgitation.      Pharyngeal Phase Assessment:     Pharyngeal phase comments: No delay in pharyngeal swallow was appreciated via palpation with adequate laryngeal elevation.  Patient swallowed 2-3 times to clear residuals from throat. No coughing or change in vocal quality was observed. Patient reported cracker stuck in throat, but was cleared with liquid wash.  Increasing WOB with ongoing trials.    Speech Therapy Plan :   Prognosis & Recommendations  Impression Summary:  Patient presents with oropharyngeal dysphagia characterized by reported difficulty swallowing, and complaints of coughing with liquids and food being stuck in throat during meals at home.  Large bolus size and pace of intake may contribute to swallowing difficulties.  Recommend Modified [place] Swallow Study to rule out aspiration and further assess pharyngeal swallow function.  Recommend continuation of regular diet with thin liquids using safe swallowing strategies. Patient would benefit from skilled speech therapy to provide education re: safe swallowing strategies and food modifications, as well as oral motor exercises to strengthen oral mechanism to improve safety of swallow.  Patient verbalized understanding and agreement with current plan of care.   Prognosis:  Good  Compensatory swallow strategies:  Upright position 90 degrees during meal and 45 minutes after meal, Reduce bolus size, Multiple swallows, Alternate liquids/solids and No talking while eating  Diet Recommendation:  Normal Consistency  Liquid Recommendation:  Thin  Goals  Short Term Goals:  1.  Patient will improve safety in swallow implementing compensatory  swallow strategies with minimal verbal cues during therapist directed trials and per patient report 85% accuracy.   2.  Patient will improve safety in swallow completing structured exercise addressing oral, pharyngeal phases of swallow completing to 85% accuracy given min verbal cues.    3.  Patient will participate in MBSS to rule out aspiration and determine least restrictive, safest diet textures for PO intake.     Short Term Goal Duration (Weeks):  2-4 weeks  Long Term Goals:  Patient to consume the least restrictive diet to maintain adequate nutrition/hydration 100% as evidenced by no overt signs or symptoms of aspiration.        Long Term Goal Duration (Weeks):  2-4 weeks  Therapy Recommendations  Recommendation:  Individual Speech Therapy and Dysphagia Treatment,  Planned Therapy Interventions:  Patient/Caregiver Education, Compensatory Strategy Training, Home Program and Dysphagia treatment,   Frequency:  1x week  Duration (in visits):  4 (4 X [id number])  Duration (in weeks):  4      Referring provider co-signature:  I have reviewed this plan of care and my co-signature certifies the need for services.    Certification Period: 12/07/2022 to  01/04/23    Physician Signature: ________________________________ Date: ______________

## 2022-12-09 NOTE — DISCHARGE INSTRUCTIONS
For your nose bleeding, use saline nose spray to keep your nose moist.  You can also apply some Vaseline to keep the area where your nasal cannula hits moist.  If your nosebleeds again you can try the nasal clamp.  If it continues to bleed for longer than 20 minutes you may need to return for a recheck.  You need to follow-up with your regular doctor concerning your bleeding.  Your laboratory studies do not show a change in your hemoglobin.  You do have evidence of a infection to your legs please take the antibiotic as directed.       RN/Patient/Family/Staff

## 2022-12-12 ENCOUNTER — APPOINTMENT (OUTPATIENT)
Dept: SPEECH THERAPY | Facility: REHABILITATION | Age: 47
End: 2022-12-12
Attending: STUDENT IN AN ORGANIZED HEALTH CARE EDUCATION/TRAINING PROGRAM
Payer: MEDICAID

## 2022-12-19 ENCOUNTER — OFFICE VISIT (OUTPATIENT)
Dept: URGENT CARE | Facility: CLINIC | Age: 47
End: 2022-12-19
Payer: MEDICAID

## 2022-12-19 ENCOUNTER — APPOINTMENT (OUTPATIENT)
Dept: SPEECH THERAPY | Facility: REHABILITATION | Age: 47
End: 2022-12-19
Attending: STUDENT IN AN ORGANIZED HEALTH CARE EDUCATION/TRAINING PROGRAM
Payer: MEDICAID

## 2022-12-19 VITALS
BODY MASS INDEX: 52.08 KG/M2 | TEMPERATURE: 98.9 F | WEIGHT: 283 LBS | OXYGEN SATURATION: 94 % | SYSTOLIC BLOOD PRESSURE: 126 MMHG | DIASTOLIC BLOOD PRESSURE: 80 MMHG | HEART RATE: 87 BPM | HEIGHT: 62 IN | RESPIRATION RATE: 24 BRPM

## 2022-12-19 DIAGNOSIS — H92.03 OTALGIA OF BOTH EARS: ICD-10-CM

## 2022-12-19 DIAGNOSIS — H65.04 RECURRENT ACUTE SEROUS OTITIS MEDIA OF RIGHT EAR: ICD-10-CM

## 2022-12-19 PROCEDURE — 99213 OFFICE O/P EST LOW 20 MIN: CPT | Performed by: PHYSICIAN ASSISTANT

## 2022-12-19 RX ORDER — CEFDINIR 300 MG/1
300 CAPSULE ORAL 2 TIMES DAILY
Qty: 14 CAPSULE | Refills: 0 | Status: SHIPPED | OUTPATIENT
Start: 2022-12-19 | End: 2022-12-26

## 2022-12-19 ASSESSMENT — ENCOUNTER SYMPTOMS
NAUSEA: 0
SHORTNESS OF BREATH: 0
EYE DISCHARGE: 0
MYALGIAS: 0
HEADACHES: 0
SORE THROAT: 0
DIARRHEA: 0
FEVER: 0
EYE REDNESS: 0
VOMITING: 0
COUGH: 0

## 2022-12-19 ASSESSMENT — FIBROSIS 4 INDEX: FIB4 SCORE: 0.7

## 2022-12-19 NOTE — PROGRESS NOTES
Subjective     Lorene Haro is a 47 y.o. female who presents with Otalgia (B/L ear pain x 2 days)            This is a new problem.  The patient presents to clinic complaining of bilateral ear pain, right greater than left, x2 days.  The patient is accompanied by her caregiver.    Otalgia   There is pain in both (right > left) ears. This is a new problem. Episode onset: x 2 days ago. The problem occurs constantly. The problem has been unchanged. There has been no fever. The pain is mild. Pertinent negatives include no coughing, diarrhea, ear discharge, headaches, sore throat or vomiting. She has tried nothing for the symptoms.     The patient was recently seen in clinic on 12/6/2022 for an ear infection.  The patient states he was prescribed antibiotics at that time.  The patient states the symptoms of her left ear improved.  However, patient reports continued symptoms of her right ear.    PMH:  has a past medical history of A-fib ([facility]), Asthma, Bipolar 2 disorder ([facility]), Chickenpox, Hypertension, On home oxygen therapy, Other psychological stress, Schizophrenic disorder ([facility]), and Yeast infection of the skin (04/01/2021).    She has no past medical history of CAD (coronary artery disease), Cancer ([facility]), Congestive heart failure ([facility]), Diabetes, Liver disease, Renal disorder, Seizure disorder ([facility]), or Stroke ([facility]).  MEDS:   Current Outpatient Medications:     albuterol 108 (90 Base) MCG/ACT Aero Soln inhalation aerosol, Inhale 2 Puffs every 6 hours as needed for Shortness of Breath., Disp: 18 g, Rfl: 3    aripiprazole (ABILIFY) 30 MG tablet, Take 1 Tablet by mouth every morning. Indications: Major Depressive Disorder, Disp: 30 Tablet, Rfl: 0    atorvastatin (LIPITOR) 20 MG Tab, Take 1 Tablet by mouth at bedtime., Disp: 30 Tablet, Rfl: 0    furosemide (LASIX) 20 MG Tab, Take 1 Tablet by mouth every day. (Patient not taking: Reported on 12/19/2022), Disp: 90 Tablet, Rfl: 0    montelukast (SINGULAIR) 10 MG Tab,  Take 1 Tablet by mouth every evening. (Patient not taking: Reported on 12/19/2022), Disp: 30 Tablet, Rfl: 0    potassium chloride SA (K-DUR) 10 MEQ Tab CR, Take 1 Tablet by mouth every day. (Patient not taking: Reported on 12/19/2022), Disp: 30 Tablet, Rfl: 0    benzonatate (TESSALON) 100 MG Cap, Take 1 Capsule by mouth 3 times a day as needed for Cough. (Patient not taking: Reported on 12/6/2022), Disp: 60 Capsule, Rfl: 0    famotidine (PEPCID) 20 MG Tab, Take 1 Tablet by mouth 2 times a day. (Patient not taking: Reported on 12/19/2022), Disp: 60 Tablet, Rfl: 0    fluticasone-salmeterol (ADVAIR) 100-50 MCG/ACT AEROSOL POWDER, BREATH ACTIVATED, Inhale 1 Puff every 12 hours. (Patient not taking: Reported on 12/19/2022), Disp: 60 Each, Rfl: 1    sennosides (SENOKOT) 8.6 MG Tab, Take 1 Tablet by mouth 1 time a day as needed (constipation). (Patient not taking: Reported on 12/19/2022), Disp: 30 Tablet, Rfl: 0    traZODone (DESYREL) 50 MG Tab, Take 1 Tablet by mouth at bedtime. (Patient not taking: Reported on 12/19/2022), Disp: 30 Tablet, Rfl: 3  ALLERGIES:   Allergies   Allergen Reactions    Penicillin G Rash and Itching     pt reports that she gets a rash all over her body and gets itchy    Amoxicillin Rash and Itching     Tolerates cephalosporins, pt reports that she gets a rash all over her body and gets itchy     SURGHX:   Past Surgical History:   Procedure Laterality Date    CHOLECYSTECTOMY      COLECTOMY      HYSTERECTOMY LAPAROSCOPY      KNEE ARTHROSCOPY Left      SOCHX:  reports that she quit smoking about 2 months ago. Her smoking use included cigarettes. She smoked an average of .25 packs per day. She has never used smokeless tobacco. She reports that she does not drink alcohol and does not use drugs.  FH: Family history was reviewed, no pertinent findings to report      Review of Systems   Constitutional:  Negative for fever.   HENT:  Positive for ear pain. Negative for congestion, ear discharge and sore  "throat.    Eyes:  Negative for discharge and redness.   Respiratory:  Negative for cough and shortness of breath.    Cardiovascular:  Negative for chest pain.   Gastrointestinal:  Negative for diarrhea, nausea and vomiting.   Musculoskeletal:  Negative for myalgias.   Neurological:  Negative for headaches.            Objective     /80   Pulse 87   Temp 37.2 °C (98.9 °F) (Temporal)   Resp (!) 24   Ht 1.575 m (5' 2\")   Wt (!) 128 kg (283 lb)   LMP  (LMP Unknown)   SpO2 94%   BMI 51.76 kg/m²      Physical Exam  Constitutional:       General: She is not in acute distress.     Appearance: Normal appearance. She is not ill-appearing.   HENT:      Head: Normocephalic and atraumatic.      Right Ear: Ear canal and external ear normal. A middle ear effusion is present. Tympanic membrane is injected and erythematous.      Left Ear: Tympanic membrane, ear canal and external ear normal. Tympanic membrane is not erythematous.      Nose: Nose normal.      Mouth/Throat:      Mouth: Mucous membranes are moist.      Pharynx: Oropharynx is clear. No posterior oropharyngeal erythema.   Eyes:      Extraocular Movements: Extraocular movements intact.      Conjunctiva/sclera: Conjunctivae normal.   Cardiovascular:      Rate and Rhythm: Normal rate and regular rhythm.      Heart sounds: Normal heart sounds.   Pulmonary:      Effort: Pulmonary effort is normal. No respiratory distress.      Breath sounds: Normal breath sounds. No wheezing.   Musculoskeletal:         General: Normal range of motion.      Cervical back: Normal range of motion and neck supple.   Skin:     General: Skin is warm and dry.   Neurological:      Mental Status: She is alert and oriented to person, place, and time.                           Assessment & Plan          1. Otalgia of both ears    2. Recurrent acute serous otitis media of right ear  - cefdinir (OMNICEF) 300 MG Cap; Take 1 Capsule by mouth 2 times a day for 7 days.  Dispense: 14 Capsule; " Refill: 0    Differential diagnoses, supportive care, and indications for immediate follow-up discussed with patient.   Instructed to return to clinic or nearest emergency department for any change in condition, further concerns, or worsening of symptoms.    OTC Tylenol or Motrin for fever/discomfort.  Drink plenty of fluids  Follow-up with PCP  AVS printed  Return to clinic or go to the ED if symptoms worsen or fail to improve, or if the patient did develop worsening/increasing ear pain, drainage from the affected ear, cough, congestion, sore throat, fever/chills, and/or any concerning symptoms.    Discussed plan with the patient and her caregiver, and they agree to the above.       I personally reviewed prior external notes and test results pertinent to today's visit.  I have independently reviewed and interpreted all diagnostics ordered during this urgent care visit.     Please note that this dictation was created using voice recognition software. I have made every reasonable attempt to correct obvious errors, but I expect that there may be errors of grammar and possibly content that I did not discover before finalizing the note.       This note was electronically signed by Viktoria Mock PA-C

## 2022-12-21 ENCOUNTER — APPOINTMENT (OUTPATIENT)
Dept: SPEECH THERAPY | Facility: REHABILITATION | Age: 47
End: 2022-12-21
Attending: STUDENT IN AN ORGANIZED HEALTH CARE EDUCATION/TRAINING PROGRAM
Payer: MEDICAID

## 2022-12-22 ENCOUNTER — NON-PROVIDER VISIT (OUTPATIENT)
Dept: CARDIOLOGY | Facility: MEDICAL CENTER | Age: 47
End: 2022-12-22
Payer: MEDICAID

## 2022-12-22 PROCEDURE — 93298 REM INTERROG DEV EVAL SCRMS: CPT | Performed by: INTERNAL MEDICINE

## 2022-12-24 DIAGNOSIS — H65.04 RECURRENT ACUTE SEROUS OTITIS MEDIA OF RIGHT EAR: ICD-10-CM

## 2022-12-24 RX ORDER — DOXYCYCLINE HYCLATE 100 MG
100 TABLET ORAL 2 TIMES DAILY
Qty: 10 TABLET | Refills: 0 | Status: SHIPPED | OUTPATIENT
Start: 2022-12-24 | End: 2022-12-29

## 2022-12-25 ENCOUNTER — HOSPITAL ENCOUNTER (EMERGENCY)
Facility: MEDICAL CENTER | Age: 47
End: 2022-12-26
Attending: EMERGENCY MEDICINE
Payer: MEDICAID

## 2022-12-25 ENCOUNTER — APPOINTMENT (OUTPATIENT)
Dept: RADIOLOGY | Facility: MEDICAL CENTER | Age: 47
End: 2022-12-25
Attending: EMERGENCY MEDICINE
Payer: MEDICAID

## 2022-12-25 DIAGNOSIS — R07.9 CHEST PAIN, UNSPECIFIED TYPE: ICD-10-CM

## 2022-12-25 DIAGNOSIS — K21.9 GASTROESOPHAGEAL REFLUX DISEASE, UNSPECIFIED WHETHER ESOPHAGITIS PRESENT: ICD-10-CM

## 2022-12-25 LAB — EKG IMPRESSION: NORMAL

## 2022-12-25 PROCEDURE — 83690 ASSAY OF LIPASE: CPT

## 2022-12-25 PROCEDURE — 36415 COLL VENOUS BLD VENIPUNCTURE: CPT

## 2022-12-25 PROCEDURE — 85025 COMPLETE CBC W/AUTO DIFF WBC: CPT

## 2022-12-25 PROCEDURE — 80053 COMPREHEN METABOLIC PANEL: CPT

## 2022-12-25 PROCEDURE — 84484 ASSAY OF TROPONIN QUANT: CPT

## 2022-12-25 PROCEDURE — 93005 ELECTROCARDIOGRAM TRACING: CPT

## 2022-12-25 PROCEDURE — 99285 EMERGENCY DEPT VISIT HI MDM: CPT

## 2022-12-25 PROCEDURE — 93005 ELECTROCARDIOGRAM TRACING: CPT | Performed by: EMERGENCY MEDICINE

## 2022-12-25 ASSESSMENT — FIBROSIS 4 INDEX: FIB4 SCORE: 0.7

## 2022-12-25 NOTE — PROGRESS NOTES
[facility] HOSPITALIST PROGRESS NOTE     Patient Identification:  Name:  Jamison Edward  Age:  52 y.o.  Sex:  male  :  1970  MRN:  [id number]  Visit Number:  [id number]  ROOM: [address] [place] [address]     Primary Care Provider:  Leticia Martinez APRN    Length of stay in inpatient status:  5    Subjective     Chief Compliant:    Chief Complaint   Patient presents with   • Leg Swelling       History of Presenting Illness:    Patient reports his abdominal pain has nearly gone away today. He denies chest pain, shortness of breath, or significant cough.    Objective     Current Hospital Meds:famotidine, 20 mg, Oral, BID AC  heparin (porcine), 5,000 Units, Subcutaneous, Q12H  isosorbide mononitrate, 30 mg, Oral, Daily  lactulose, 10 g, Oral, BID  levETIRAcetam, 1,500 mg, Oral, Nightly  levETIRAcetam, 750 mg, Oral, BID  methylPREDNISolone sodium succinate, 40 mg, Intravenous, Q4H  metoprolol succinate XL, 25 mg, Oral, Daily  nicotine, 1 patch, Transdermal, Q24H  OLANZapine zydis, 5 mg, Oral, Once  QUEtiapine, 50 mg, Oral, Nightly  sodium chloride, 10 mL, Intravenous, Q12H  thiamine, 100 mg, Oral, Daily         Current Antimicrobial Therapy:  Anti-Infectives (From admission, onward)    Ordered     Dose/Rate Route Frequency Start Stop    22  cefTRIAXone (ROCEPHIN) 2 g in sodium chloride 0.9 % 100 mL IVPB-VTB        Ordering Provider: Alexandre Houston MD    2 g  200 mL/hr over 30 Minutes Intravenous Once 22  doxycycline (VIBRAMYCIN) 100 mg in sodium chloride 0.9 % 100 mL IVPB-VTB        Ordering Provider: Alexandre Houston MD    100 mg  over 60 Minutes Intravenous Once 22  doxycycline (MONODOX) 100 MG capsule        Ordering Provider: Bella Khan MD    100 mg Oral 2 Times Daily 22 0000 22 0702        Current Diuretic Therapy:  Diuretics (From admission, onward)    None     12 hour cc       ----------------------------------------------------------------------------------------------------------------------  Vital Signs:  Temp:  [98 °F (36.7 °C)] 98 °F (36.7 °C)  Heart Rate:  [] 98  Resp:  [16-18] 16  BP: (112-129)/(74) 129/74  SpO2:  [98 %] 98 %  on   ;   Device (Oxygen Therapy): room air  Body mass index is 27.21 kg/m².    Wt Readings from Last 3 Encounters:   12/25/22 76.5 kg (168 lb 9.6 oz)   12/20/22 75.3 kg (166 lb)   11/18/22 70.9 kg (156 lb 3.2 oz)     Intake & Output (last 3 days)       12/22 0701  12/23 0700 12/23 0701  12/24 0700 12/24 0701  12/25 0700 12/25 0701  12/26 0700    P.O. 960  670 960    I.V. (mL/kg) 729.3 (9.5)       Total Intake(mL/kg) 1689.3 (22)  670 (8.8) 960 (12.5)    Urine (mL/kg/hr)        Total Output        Net +1689.3  +670 +960            Urine Unmeasured Occurrence 1 x 2 x 3 x 2 x    Stool Unmeasured Occurrence 1 x 4 x 2 x 1 x        Diet: Regular/House Diet; Texture: Regular Texture (IDDSI 7); Fluid Consistency: Thin (IDDSI 0)  ----------------------------------------------------------------------------------------------------------------------  Physical exam:   Constitutional:  Well-developed and well-nourished. No acute distress. Appears chronically ill.  HENT:  Head:  Normocephalic and atraumatic.    Cardiac: Heart regular rate and rhythm, no murmur  Pulmonary/Chest:  No respiratory distress, normal rate and effort. Breath sounds clear to auscultation bilaterally.  Abdominal: Abdomen is moderately distended but minimally tender in periumbilical region. No rebound tenderness today, no rigidity/guarding. Normal bowel sounds noted.  Musculoskeletal:  No deformity.    Neurological:  Awake, alert, no focal deficit on gross examination. No slurred speech or facial droop.   Skin:  Skin is warm and dry.   Peripheral vascular:  No cyanosis  Psychiatric: Cooperative, flat affect  Edited by: Margo Khan DO at 12/25/2022  1742  ----------------------------------------------------------------------------------------------------------------------  Results from last 7 days   Lab Units 12/25/22  0007 12/24/22  0134 12/23/22  0430 12/22/22  0720 12/22/22  0047 12/21/22  1858 12/21/22  0422 12/21/22  0315 12/20/22  2352 12/20/22  1928 12/20/22  1629   CRP mg/dL  --   --   --   --   --   --   --   --   --   --  6.78*   LACTATE mmol/L  --   --   --  1.6  --  4.4* 2.8*  --   --    < > 3.2*   WBC 10*3/mm3 9.68 6.93 7.56  --    < >  --   --    < >  --   --  9.76   HEMOGLOBIN g/dL 12.1* 11.4* 11.7*  --    < >  --   --    < >  --   --  12.3*   HEMATOCRIT % 37.2* 34.6* 36.5*  --    < >  --   --    < >  --   --  35.4*   MCV fL 106.0* 103.6* 105.8*  --    < >  --   --    < >  --   --  101.4*   MCHC g/dL 32.5 32.9 32.1  --    < >  --   --    < >  --   --  34.7   PLATELETS 10*3/mm3 86* 74* 75*  --    < >  --   --    < >  --   --  105*   INR   --  1.44*  --   --   --   --   --   --  1.36*  --   --     < > = values in this interval not displayed.     Results from last 7 days   Lab Units 12/20/22 2054   PH, ARTERIAL pH units 7.510*   PO2 ART mm Hg 62.4*   PCO2, ARTERIAL mm Hg 32.4*   HCO3 ART mmol/L 25.8     Results from last 7 days   Lab Units 12/25/22  0042 12/25/22  0007 12/24/22  0134 12/23/22  1738 12/23/22  0430 12/21/22  0315 12/20/22  1629   SODIUM mmol/L  --  136 142  --  137   < > 136   POTASSIUM mmol/L  --  4.2 4.0 4.7 3.5   < > 3.2*   MAGNESIUM mg/dL 2.2  --  1.8  --   --   --  1.9   CHLORIDE mmol/L  --  105 110*  --  108*   < > 101   CO2 mmol/L  --  19.2* 18.3*  --  22.7   < > 25.1   BUN mg/dL  --  10 10  --  10   < > 8   CREATININE mg/dL  --  0.47* 0.44*  --  0.49*   < > 0.71*   CALCIUM mg/dL  --  7.9* 7.9*  --  7.7*   < > 8.0*   GLUCOSE mg/dL  --  132* 88  --  92   < > 124*   ALBUMIN g/dL  --  2.24* 2.15*  --  2.25*   < > 2.14*   BILIRUBIN mg/dL  --  2.0* 2.0*  --  2.2*   < > 3.0*   ALK PHOS U/L  --  181* 168*  --  179*   < > 243*   AST  (SGOT) U/L  --  42* 46*  --  44*   < > 28   ALT (SGPT) U/L  --  24 22  --  20   < > 20    < > = values in this interval not displayed.   Estimated Creatinine Clearance: 194.4 mL/min (A) (by C-G formula based on SCr of 0.47 mg/dL (L)).  Ammonia   Date Value Ref Range Status   12/23/2022 42 16 - 60 umol/L Final     Results from last 7 days   Lab Units 12/20/22  1629   TROPONIN T ng/mL <0.010     Results from last 7 days   Lab Units 12/20/22  1629   PROBNP pg/mL 178.2         No results found for: HGBA1C, POCGLU  Lab Results   Component Value Date    TSH 1.600 12/20/2022    FREET4 0.94 12/20/2022     No results found for: PREGTESTUR, PREGSERUM, HCG, HCGQUANT  Pain Management Panel     Pain Management Panel Latest Ref Rng & Units 12/20/2022 11/15/2022    AMPHETAMINES SCREEN, URINE Negative Negative Negative    BARBITURATES SCREEN Negative Negative Negative    BENZODIAZEPINE SCREEN, URINE Negative Negative Negative    BUPRENORPHINEUR Negative Negative Negative    COCAINE SCREEN, URINE Negative Negative Negative    METHADONE SCREEN, URINE Negative Negative Negative    METHAMPHETAMINEUR Negative Negative Negative        Brief Urine Lab Results  (Last result in the past 365 days)      Color   Clarity   Blood   Leuk Est   Nitrite   Protein   CREAT   Urine HCG        12/20/22 1849 Orange   Clear   Negative   Trace   Positive   Trace               Blood Culture   Date Value Ref Range Status   12/22/2022 No growth at 3 days  Preliminary   12/22/2022 No growth at 3 days  Preliminary   12/20/2022 Staphylococcus, coagulase negative (C)  Final   12/20/2022 No growth at 4 days  Preliminary     No results found for: URINECX  No results found for: WOUNDCX  No results found for: STOOLCX  No results found for: RESPCX  No results found for: AFBCX  Results from last 7 days   Lab Units 12/22/22  0720 12/21/22  1858 12/21/22  0422 12/20/22  1928 12/20/22  1629   PROCALCITONIN ng/mL  --   --   --   --  1.69*   LACTATE mmol/L 1.6 4.4* 2.8*  4.2* 3.2*   CRP mg/dL  --   --   --   --  6.78*       I have personally looked at the labs and they are summarized above.  ----------------------------------------------------------------------------------------------------------------------  Detailed radiology reports for the last 24 hours:  Imaging Results (Last 24 Hours)     ** No results found for the last 24 hours. **        Assessment & Plan      #Bilateral multifocal community acquired pneumonia, present on admission  #Septic shock criteria met with HR>90, lactate>4, CRP>2, and elevated procalcitonin secondary to pneumonia  - Pneumonia appears to have resolved.  - Elevated lactate has now normalized. Respiratory status remains stable on room air.  - Blood cultures from 12/20/22 showed coagulase negative staph in 1/2 bottles, thought likely to be contaminant. Repeat blood cultures still show no growth at 3 days. Continue to follow blood culture results.  - Respiratory panel PCR negative, strep pneumo antigen negative  - He received treatment for pneumonia with ceftriaxone and doxycycline. Current literature points toward at least 3 days of antibiotics for mild-moderate community acquired pneumonia being non-inferior to a longer course, and since he had significant improvement in respiratory symptoms but was having significant alteration in mental status the benefit was felt to outweigh the risk of discontinuing his antibiotics. He has not had any signs of worsening infection.     #Confusion  #Alcohol withdrawal  - Etiology of confusion is not entirely clear. Thought to be possibly multifactorial secondary to alcohol withdrawal, elevated ammonia level, and acute illness; however, his confusion has persisted despite resolution of elevated ammonia and treatment of infection. He received ativan for elevated CIWA score previously, and his wife is concerned that he has had worse confusion when given ativan in the past (though his confusion started before the ativan  was given). She also believes the confusion unlikely to be due to alcohol withdrawal, as she reports he has not had alcohol in about 4-5 weeks. She also thinks it unlikely to be psychiatric in origin and says he has had confusion when electrolytes have been abnormal in the past, although sodium, potassium, and magnesium are now WNL. Ceftriaxone discontinued as discussed above due to possible rare risk of neurotoxicity. He is receiving thiamine supplementation in the setting of alcohol withdrawal.  - Continue monitoring closely. Confusion has persisted but seems marginally better today, with patient having called his wife to wish her a Jessica Medley and seeming more oriented to situation. Consider Neurology or Psychiatry evaluation if symptoms do not improve with current interventions.    #Liver cirrhosis, unknown etiology  #Moderate volume ascites  #Hyperbilirubinemia  #Hyperammonemia  #Chronically elevated alkaline phosphatase  #Hypoalbuminemia  #Pseudohypocalcemia 2/2 hypoalbuminemia  #Abdominal pain  - CT findings suggestive of cirrhosis on admission, significantly worse than previous CT in November.  - Etiology unclear, but differential diagnosis favors alcohol +/- acetaminophen induced over hemochromatosis. Less likely primary sclerosing cholangitis as he had a normal MRCP one month ago, but it is still on the differential. Patient initially reported he stopped drinking about 4-5 weeks ago, but after having a fall and becoming anxious he reported to nursing staff he actually stopped drinking 2 days prior to admission. Patient's wife also says he stopped drinking 4-5 weeks ago. Alcohol withdrawal discussed above.  - Follow up with outpatient GI as scheduled  - Avoid hepatotoxins as able.  - Repeat CMP in AM.  - Abdomen seemed very tender yesterday so a repeat CT scan was obtained, which showed large ascites, mild distention of the small bowel, fatty liver, and significant improvement in previously seen colonic  wall thickening. Patchy groundglass opacities were noted in the lungs but his COVID-19 test was negative at admission and his respiratory symptoms have nearly resolved.   - His abdominal pain has now nearly resolved and his abdominal exam is much improved. I discussed possible paracentesis with patient and his wife and they are agreeable. Interventional Radiology is not here to perform paracentesis today due to weekend and holiday.     #Positive urine drug screen  - UDS positive for TCAs at admission (inconsistent with home medication list). He denied any current drug use.    #Reported left leg weakness  #Multilevel degenerative disc disease  #History of TIA/CVA  - Nonspecific periventricular white matter lesions noted on MRI brain likely representing chronic deep white matter ischemic change, no acute abnormalities noted. MRI lumbar spine showed multilevel degenerative changes with moderate canal stenosis at L5-S1 and mild to moderate canal stenosis at L3-L4.   - Continue neurochecks, fall precautions  - Up with assistance  - PT/OT consulted, appreciate assistance    #Acute hypokalemia  #Borderline hypomagnesemia  - Improved  - Replace per protocol  - Monitor with chemistry panel periodically     #Chronic macrocytic anemia  #Chronic thrombocytopenia  - Followed with hematology in the past, last appointment in 12/2021  - Iron low, iron saturation elevated, transferrin low, TIBC low on iron studies. Ferritin not obtained so will order this to be added to a.m. labs tomorrow.  - Hgb decreased slightly from 13.6 on admission, but is slightly better than yesterday at 12.1. Platelets decreased from 105 to 74, but have improved to 86 today. No signs of acute bleeding noted but he does have some mild bruising on exam. INR has remained slightly elevated but stable around 1.3-1.4. Likely secondary to cirrhosis.   - Monitor for development of signs/symptoms of acute bleeding    #Hypertension   - BP is at goal. Monitor per  protocol. Continue home metoprolol.     #GERD  #Johnson's esophagus  - Continue PPI    #Ulcerative colitis  - Abdominal pain nearly resolved, no findings of worsening colitis on CT. Continue supportive care.    #Tobacco use  - Nicotine patch made available   Edited by: Margo Khan DO at 12/25/2022 4438    VTE Prophylaxis:   Mechanical Order History:     None      Pharmalogical Order History:      Ordered     Dose Route Frequency Stop    12/21/22 0106  heparin (porcine) 5000 UNIT/ML injection 5,000 Units         5,000 Units SC Every 12 Hours Scheduled --                Dispo:  likely home at discharge pending clinical improvement, possibly within 24-48 hours     Margo Khan DO  [facility]  12/25/22  17:42 EST

## 2022-12-26 VITALS
HEART RATE: 65 BPM | WEIGHT: 274.91 LBS | HEIGHT: 62 IN | TEMPERATURE: 97 F | RESPIRATION RATE: 17 BRPM | SYSTOLIC BLOOD PRESSURE: 126 MMHG | DIASTOLIC BLOOD PRESSURE: 57 MMHG | OXYGEN SATURATION: 100 % | BODY MASS INDEX: 50.59 KG/M2

## 2022-12-26 LAB
ALBUMIN SERPL BCP-MCNC: 4.2 G/DL (ref 3.2–4.9)
ALBUMIN/GLOB SERPL: 1.4 G/DL
ALP SERPL-CCNC: 115 U/L (ref 30–99)
ALT SERPL-CCNC: 14 U/L (ref 2–50)
ANION GAP SERPL CALC-SCNC: 8 MMOL/L (ref 7–16)
AST SERPL-CCNC: 14 U/L (ref 12–45)
BASOPHILS # BLD AUTO: 0.1 % (ref 0–1.8)
BASOPHILS # BLD: 0.01 K/UL (ref 0–0.12)
BILIRUB SERPL-MCNC: 0.9 MG/DL (ref 0.1–1.5)
BLOOD CULTURE HOLD CXBCH: NORMAL
BUN SERPL-MCNC: 22 MG/DL (ref 8–22)
CALCIUM ALBUM COR SERPL-MCNC: 9.4 MG/DL (ref 8.5–10.5)
CALCIUM SERPL-MCNC: 9.6 MG/DL (ref 8.5–10.5)
CHLORIDE SERPL-SCNC: 103 MMOL/L (ref 96–112)
CO2 SERPL-SCNC: 30 MMOL/L (ref 20–33)
CREAT SERPL-MCNC: 1.12 MG/DL (ref 0.5–1.4)
EOSINOPHIL # BLD AUTO: 0.11 K/UL (ref 0–0.51)
EOSINOPHIL NFR BLD: 1 % (ref 0–6.9)
ERYTHROCYTE [DISTWIDTH] IN BLOOD BY AUTOMATED COUNT: 56.5 FL (ref 35.9–50)
GFR SERPLBLD CREATININE-BSD FMLA CKD-EPI: 60 ML/MIN/1.73 M 2
GLOBULIN SER CALC-MCNC: 3.1 G/DL (ref 1.9–3.5)
GLUCOSE SERPL-MCNC: 87 MG/DL (ref 65–99)
HCT VFR BLD AUTO: 41.7 % (ref 37–47)
HGB BLD-MCNC: 12.6 G/DL (ref 12–16)
IMM GRANULOCYTES # BLD AUTO: 0.06 K/UL (ref 0–0.11)
IMM GRANULOCYTES NFR BLD AUTO: 0.6 % (ref 0–0.9)
LIPASE SERPL-CCNC: 23 U/L (ref 11–82)
LYMPHOCYTES # BLD AUTO: 1.87 K/UL (ref 1–4.8)
LYMPHOCYTES NFR BLD: 17.8 % (ref 22–41)
MCH RBC QN AUTO: 27.2 PG (ref 27–33)
MCHC RBC AUTO-ENTMCNC: 30.2 G/DL (ref 33.6–35)
MCV RBC AUTO: 90.1 FL (ref 81.4–97.8)
MONOCYTES # BLD AUTO: 0.69 K/UL (ref 0–0.85)
MONOCYTES NFR BLD AUTO: 6.6 % (ref 0–13.4)
NEUTROPHILS # BLD AUTO: 7.79 K/UL (ref 2–7.15)
NEUTROPHILS NFR BLD: 73.9 % (ref 44–72)
NRBC # BLD AUTO: 0 K/UL
NRBC BLD-RTO: 0 /100 WBC
PLATELET # BLD AUTO: 247 K/UL (ref 164–446)
PMV BLD AUTO: 11.7 FL (ref 9–12.9)
POTASSIUM SERPL-SCNC: 4.2 MMOL/L (ref 3.6–5.5)
PROT SERPL-MCNC: 7.3 G/DL (ref 6–8.2)
RBC # BLD AUTO: 4.63 M/UL (ref 4.2–5.4)
SODIUM SERPL-SCNC: 141 MMOL/L (ref 135–145)
TROPONIN T SERPL-MCNC: 21 NG/L (ref 6–19)
WBC # BLD AUTO: 10.5 K/UL (ref 4.8–10.8)

## 2022-12-26 PROCEDURE — A9270 NON-COVERED ITEM OR SERVICE: HCPCS | Performed by: EMERGENCY MEDICINE

## 2022-12-26 PROCEDURE — 71045 X-RAY EXAM CHEST 1 VIEW: CPT

## 2022-12-26 PROCEDURE — 700102 HCHG RX REV CODE 250 W/ 637 OVERRIDE(OP): Performed by: EMERGENCY MEDICINE

## 2022-12-26 PROCEDURE — 700111 HCHG RX REV CODE 636 W/ 250 OVERRIDE (IP): Performed by: EMERGENCY MEDICINE

## 2022-12-26 PROCEDURE — 96372 THER/PROPH/DIAG INJ SC/IM: CPT

## 2022-12-26 RX ORDER — KETOROLAC TROMETHAMINE 30 MG/ML
15 INJECTION, SOLUTION INTRAMUSCULAR; INTRAVENOUS ONCE
Status: DISCONTINUED | OUTPATIENT
Start: 2022-12-26 | End: 2022-12-26

## 2022-12-26 RX ORDER — KETOROLAC TROMETHAMINE 30 MG/ML
30 INJECTION, SOLUTION INTRAMUSCULAR; INTRAVENOUS ONCE
Status: COMPLETED | OUTPATIENT
Start: 2022-12-26 | End: 2022-12-26

## 2022-12-26 RX ORDER — FAMOTIDINE 20 MG/1
20 TABLET, FILM COATED ORAL 2 TIMES DAILY
Qty: 60 TABLET | Refills: 0 | Status: SHIPPED | OUTPATIENT
Start: 2022-12-26 | End: 2023-01-26 | Stop reason: SDUPTHER

## 2022-12-26 RX ORDER — ALUMINA, MAGNESIA, AND SIMETHICONE 2400; 2400; 240 MG/30ML; MG/30ML; MG/30ML
20 SUSPENSION ORAL ONCE
Status: COMPLETED | OUTPATIENT
Start: 2022-12-26 | End: 2022-12-26

## 2022-12-26 RX ORDER — FAMOTIDINE 20 MG/1
20 TABLET, FILM COATED ORAL ONCE
Status: DISCONTINUED | OUTPATIENT
Start: 2022-12-26 | End: 2022-12-26 | Stop reason: HOSPADM

## 2022-12-26 RX ADMIN — KETOROLAC TROMETHAMINE 30 MG: 30 INJECTION, SOLUTION INTRAMUSCULAR at 00:23

## 2022-12-26 RX ADMIN — ALUMINUM HYDROXIDE, MAGNESIUM HYDROXIDE, AND DIMETHICONE 20 ML: 400; 400; 40 SUSPENSION ORAL at 00:23

## 2022-12-26 NOTE — DISCHARGE INSTRUCTIONS
You were seen in the ED for chest pain. Your physical exam, EKG, chest X-ray and bloodwork evaluating your heart were performed. All of these tests were reassuring. It does not appear that you had a heart attack. The cause of your pain is unclear, however it does not appear to be dangerous at this time. At this time it is safe for you to go home.        You are being prescribed Pepcid (famotidine). This is also available over the counter. This can be very effective at gastritis and acid reflux symptoms. However, if you have to take it daily for more than a couple weeks, the body becomes used to it and you will need to switch to Prilosec (omeprazole). This medication is also available over the counter.     You may also take Tums or Maalox as needed for your symptoms. If your symptoms do not improve, you may need to see a gastroenterologist for a procedure where they use a camera to look at your esophagus and stomach. This procedure is called an endoscopy.    Return to the ED if you develop chest pain, lightheadedness, shortness of breath, if your pain does not improve, or for any other new or concerning symptoms.

## 2022-12-26 NOTE — ED PROVIDER NOTES
"ER Provider Note    Scribed for ALISSA CARRASQUILLO by Rolan Polanco. 12/25/2022  11:56 PM    Primary Care Provider: Heriberto Sharp M.D.  Means of Arrival: EMS  History obtained from: Patient    CHIEF COMPLAINT  Chief Complaint   Patient presents with    Chest Pain     X 3 days radiating to back, 10/10 sharp pain     LIMITATION TO HISTORY   Select: None    HPI  Lorene Haro is a 47 y.o. female who presents to the ED via EMS complaining of worsening chest pain onset a few days ago. She describes the pain as a tightness in the center of her chest. She states that the pain worsened last night which is what brought her in but clarifies that the pain has be intermittent for more than just a few days. She is on 3L of supplemental oxygen at baseline and reports that her breathing quality changes day to day. Lorene states that the sensation on her legs has changes but that it's \"been there for a while.\" She endorses associated nausea last night and leg pain but denies any vomiting, fever, cough, or leg swelling. Lorene reports that she lives in a group home.     OUTSIDE HISTORIAN(S):  Select: None    REVIEW OF SYSTEMS  CONSTITUTIONAL:  No fever.  CARDIOVASCULAR:  See HPI  RESPIRATORY:  See HPI  GASTROINTESTINAL:  No abdominal pain.  GENITOURINARY:   No dysuria.  MUSCULOSKELETAL:  No arthralgia.  SKIN:  No rash or suspicious lesions.  NEUROLOGIC:   No headache.    See HPI for further details.   All other systems are negative.      PAST MEDICAL HISTORY  Past Medical History:   Diagnosis Date    A-fib (HCC)     Asthma     Bipolar 2 disorder (HCC)     Chickenpox     Hypertension     On home oxygen therapy     Other psychological stress     Schizophrenic disorder (HCC)     Yeast infection of the skin 04/01/2021     SURGICAL HISTORY  Past Surgical History:   Procedure Laterality Date    CHOLECYSTECTOMY      COLECTOMY      HYSTERECTOMY LAPAROSCOPY      KNEE ARTHROSCOPY Left      FAMILY HISTORY  Family History   Problem Relation Age " of Onset    No Known Problems Mother     Cancer Sister      SOCIAL HISTORY   reports that she quit smoking about 2 months ago. Her smoking use included cigarettes. She smoked an average of .25 packs per day. She has never used smokeless tobacco. She reports that she does not drink alcohol and does not use drugs.    CURRENT MEDICATIONS  Discharge Medication List as of 12/26/2022  1:36 AM        CONTINUE these medications which have NOT CHANGED    Details   doxycycline (VIBRAMYCIN) 100 MG Tab Take 1 Tablet by mouth 2 times a day for 5 days., Disp-10 Tablet, R-0, Normal      cefdinir (OMNICEF) 300 MG Cap Take 1 Capsule by mouth 2 times a day for 7 days., Disp-14 Capsule, R-0, Normal      albuterol 108 (90 Base) MCG/ACT Aero Soln inhalation aerosol Inhale 2 Puffs every 6 hours as needed for Shortness of Breath., Disp-18 g, R-3, Print Rx Paper      aripiprazole (ABILIFY) 30 MG tablet Take 1 Tablet by mouth every morning. Indications: Major Depressive Disorder, Disp-30 Tablet, R-0, Print Rx Paper      atorvastatin (LIPITOR) 20 MG Tab Take 1 Tablet by mouth at bedtime., Disp-30 Tablet, R-0, Print Rx Paper      furosemide (LASIX) 20 MG Tab Take 1 Tablet by mouth every day., Disp-90 Tablet, R-0, Print Rx Paper      montelukast (SINGULAIR) 10 MG Tab Take 1 Tablet by mouth every evening., Disp-30 Tablet, R-0, Print Rx Paper      potassium chloride SA (K-DUR) 10 MEQ Tab CR Take 1 Tablet by mouth every day., Disp-30 Tablet, R-0, Print Rx Paper      benzonatate (TESSALON) 100 MG Cap Take 1 Capsule by mouth 3 times a day as needed for Cough., Disp-60 Capsule, R-0, Normal      fluticasone-salmeterol (ADVAIR) 100-50 MCG/ACT AEROSOL POWDER, BREATH ACTIVATED Inhale 1 Puff every 12 hours., Disp-60 Each, R-1, Normal      sennosides (SENOKOT) 8.6 MG Tab Take 1 Tablet by mouth 1 time a day as needed (constipation)., Disp-30 Tablet, R-0, Normal      traZODone (DESYREL) 50 MG Tab Take 1 Tablet by mouth at bedtime., Disp-30 Tablet, R-3,  "Normal           ALLERGIES  Penicillin g and Amoxicillin    PHYSICAL EXAM  Gen: Alert, no acute distress  HEENT: ATNC  Eyes: PERRL, EOMI, normal conjunctiva  Neck: trachea midline  Resp: no respiratory distress, CTAB. Light crackles in the bases.   CV: No JVD, RRR. No M/R/G. Equal radial pulses  Abd: non-distended, non-tender  Ext: 2+ pitting edema, no calf tenderness  Psych: normal mood  Neuro: speech fluent     VITAL SIGNS:   /57   Pulse 75   Temp 36.1 °C (97 °F) (Temporal)   Resp 18   Ht 1.575 m (5' 2\")   Wt 125 kg (274 lb 14.6 oz)   LMP  (LMP Unknown)   SpO2 98%   BMI 50.28 kg/m²     DIAGNOSTIC STUDIES    Labs:   Results for orders placed or performed during the hospital encounter of 12/25/22   CBC with Differential   Result Value Ref Range    WBC 10.5 4.8 - 10.8 K/uL    RBC 4.63 4.20 - 5.40 M/uL    Hemoglobin 12.6 12.0 - 16.0 g/dL    Hematocrit 41.7 37.0 - 47.0 %    MCV 90.1 81.4 - 97.8 fL    MCH 27.2 27.0 - 33.0 pg    MCHC 30.2 (L) 33.6 - 35.0 g/dL    RDW 56.5 (H) 35.9 - 50.0 fL    Platelet Count 247 164 - 446 K/uL    MPV 11.7 9.0 - 12.9 fL    Neutrophils-Polys 73.90 (H) 44.00 - 72.00 %    Lymphocytes 17.80 (L) 22.00 - 41.00 %    Monocytes 6.60 0.00 - 13.40 %    Eosinophils 1.00 0.00 - 6.90 %    Basophils 0.10 0.00 - 1.80 %    Immature Granulocytes 0.60 0.00 - 0.90 %    Nucleated RBC 0.00 /100 WBC    Neutrophils (Absolute) 7.79 (H) 2.00 - 7.15 K/uL    Lymphs (Absolute) 1.87 1.00 - 4.80 K/uL    Monos (Absolute) 0.69 0.00 - 0.85 K/uL    Eos (Absolute) 0.11 0.00 - 0.51 K/uL    Baso (Absolute) 0.01 0.00 - 0.12 K/uL    Immature Granulocytes (abs) 0.06 0.00 - 0.11 K/uL    NRBC (Absolute) 0.00 K/uL   Comp Metabolic Panel   Result Value Ref Range    Sodium 141 135 - 145 mmol/L    Potassium 4.2 3.6 - 5.5 mmol/L    Chloride 103 96 - 112 mmol/L    Co2 30 20 - 33 mmol/L    Anion Gap 8.0 7.0 - 16.0    Glucose 87 65 - 99 mg/dL    Bun 22 8 - 22 mg/dL    Creatinine 1.12 0.50 - 1.40 mg/dL    Calcium 9.6 8.5 - " 10.5 mg/dL    AST(SGOT) 14 12 - 45 U/L    ALT(SGPT) 14 2 - 50 U/L    Alkaline Phosphatase 115 (H) 30 - 99 U/L    Total Bilirubin 0.9 0.1 - 1.5 mg/dL    Albumin 4.2 3.2 - 4.9 g/dL    Total Protein 7.3 6.0 - 8.2 g/dL    Globulin 3.1 1.9 - 3.5 g/dL    A-G Ratio 1.4 g/dL   Blood Culture,Hold   Result Value Ref Range    Blood Culture Hold Collected    CORRECTED CALCIUM   Result Value Ref Range    Correct Calcium 9.4 8.5 - 10.5 mg/dL   ESTIMATED GFR   Result Value Ref Range    GFR (CKD-EPI) 60 >60 mL/min/1.73 m 2   LIPASE   Result Value Ref Range    Lipase 23 11 - 82 U/L   TROPONIN   Result Value Ref Range    Troponin T 21 (H) 6 - 19 ng/L   EKG (NOW)   Result Value Ref Range    Report       [facility] Emergency Dept.    Test Date:  2022  Pt Name:    ADELINA MILLAN                Department: ER  MRN:        [id number]                      Room:  Gender:     Female                       Technician: [id number]  :        1975                   Requested By:ER TRIAGE PROTOCOL  Order #:    [id number]                    Reading MD: Ciro Malone    Measurements  Intervals                                Axis  Rate:       70                           P:          65  AK:         128                          QRS:        50  QRSD:       105                          T:          18  QT:         390  QTc:        421    Interpretive Statements  Sinus rhythm  Low voltage, precordial leads  Compared to ECG 2022 01:49:27  Low QRS voltage now present  ST (T wave) deviation no longer present  Electronically Signed On 2022 23:52:41 PST by Ciro Malone      All labs reviewed by me.    Radiology:   DX-CHEST-PORTABLE (1 VIEW)   Final Result      1.  No acute cardiopulmonary abnormality identified.      2.  Enlarged cardiac silhouette       The radiologist's interpretation of all radiological studies have been reviewed by me.    COURSE & MEDICAL DECISION MAKING     Nursing notes, vital signs, PMSFSHx reviewed in  chart       DISCUSSION OF MANAGEMENT WITH OTHER PHYSICIANS, QHP OR APPROPRIATE SOURCE  Select: None    INDEPENDENT INTERPRETATION OF STUDIES  Select: EKG(s) see above and X-ray(s) chest x-ray: No evidence of acute processes    ESCALATION OF CARE  the patient was evaluated, after which their care was escalated by ordering, blood analysis, diagnostic imaging, and cardiac monitoring    SOCIAL DETERMINANTS THAT SIGNIFICANTLY AFFECT CARE  Select: None    DIAGNOSTICS TESTS CONSIDERED  D-dimer patient has no hemoptysis, is on her baseline home oxygen, not tachycardic, no unilateral leg swelling, no pleuritic nature to the pain, low suspicion for PE    PLAN   11:56 PM  Patient was treated with Toradol 15mg and mag hydrox-al hydrox-simeth 20mL PO for her symptoms.     1:30 AM   Patient was reevaluated at bedside. Discussed lab and radiology results with the patient and family and informed them that their labs and imaging were reassuring. I discussed at-home relief options such as re-prescribing Pepcid. Patient will be treated with Pepcid 20mg PO and reevaluated. Patient verbalizes understanding and agreement to this plan of care.        COURSE  The patient presented with symptoms concerning for cardiac chest pain. The EKG was not ischemic and the patient's initial workup was negative for MI, pneumothorax, heart failure. The patient's symptoms, labs and vital signs are not consistent with a pulmonary embolism. The chest x-ray and symptoms are not suggestive of an aortic dissection.  Patient did have some improvement with nonsteroidal anti-inflammatory medication as well as Maalox, will restart her on Pepcid.  Based on the patient's clinical picture, an admission for inpatient stress test was not indicated and the patient will follow up with their outpatient provider for follow up evaluation. The patient was provided with return precautions.    Heart score: 3    DISPOSITION   Patient will be discharged home.    FOLLOW  UP:  Your regular doctor.  To establish a primary care provider within our system, please call [phone]          [facility], Emergency Dept  [address] [facility] [address] [phone]    If symptoms worsen    CONDITION AT DISPOSITION  Stable     FINAL IMPRESSION   1. Chest pain, unspecified type    2. Gastroesophageal reflux disease, unspecified whether esophagitis present       Rolan HARRIS (Scribe), am scribing for, and in the presence of, Ciro Malone M.D..    Electronically signed by: Rolan Polanco (Scribe), 12/25/2022    ICiro M.D. personally performed the services described in this documentation, as scribed by Rolan Polanco in my presence, and it is both accurate and complete.       The note accurately reflects work and decisions made by me.  Ciro Maloen M.D.  12/26/2022  2:28 AM

## 2022-12-26 NOTE — ED TRIAGE NOTES
"Chief Complaint   Patient presents with    Chest Pain     X 3 days radiating to back, 10/10 sharp pain       46 yo F to triage for above complaint. Denies SOB. Protocol ordered.      Pt placed in lobby. Pt educated on triage process. Pt encouraged to alert staff for any changes.     Patient and staff wearing appropriate PPE    /57   Pulse 75   Temp 36.1 °C (97 °F) (Temporal)   Resp 18   Ht 1.575 m (5' 2\")   Wt 125 kg (274 lb 14.6 oz)   LMP  (LMP Unknown)   SpO2 98%   BMI 50.28 kg/m²     "

## 2022-12-27 NOTE — CARDIAC REMOTE MONITOR - SCAN
Device transmission reviewed. Device demonstrated appropriate function.       Electronically Signed by: Tyrell Solomon M.D.    1/5/2023  10:06 AM

## 2023-01-07 ENCOUNTER — HOSPITAL ENCOUNTER (EMERGENCY)
Facility: MEDICAL CENTER | Age: 48
End: 2023-01-07
Attending: EMERGENCY MEDICINE
Payer: MEDICAID

## 2023-01-07 ENCOUNTER — APPOINTMENT (OUTPATIENT)
Dept: RADIOLOGY | Facility: MEDICAL CENTER | Age: 48
End: 2023-01-07
Attending: EMERGENCY MEDICINE
Payer: MEDICAID

## 2023-01-07 ENCOUNTER — APPOINTMENT (OUTPATIENT)
Dept: RADIOLOGY | Facility: MEDICAL CENTER | Age: 48
End: 2023-01-07
Payer: MEDICAID

## 2023-01-07 VITALS
HEART RATE: 67 BPM | TEMPERATURE: 97.7 F | HEIGHT: 62 IN | RESPIRATION RATE: 20 BRPM | BODY MASS INDEX: 50.61 KG/M2 | WEIGHT: 275 LBS | SYSTOLIC BLOOD PRESSURE: 108 MMHG | OXYGEN SATURATION: 98 % | DIASTOLIC BLOOD PRESSURE: 56 MMHG

## 2023-01-07 DIAGNOSIS — R07.9 LEFT-SIDED CHEST PAIN: ICD-10-CM

## 2023-01-07 DIAGNOSIS — J44.1 ACUTE EXACERBATION OF CHRONIC OBSTRUCTIVE PULMONARY DISEASE (COPD) (HCC): ICD-10-CM

## 2023-01-07 LAB
ALBUMIN SERPL BCP-MCNC: 4.1 G/DL (ref 3.2–4.9)
ALBUMIN/GLOB SERPL: 1.3 G/DL
ALP SERPL-CCNC: 114 U/L (ref 30–99)
ALT SERPL-CCNC: 14 U/L (ref 2–50)
ANION GAP SERPL CALC-SCNC: 7 MMOL/L (ref 7–16)
AST SERPL-CCNC: 13 U/L (ref 12–45)
BASOPHILS # BLD AUTO: 0.2 % (ref 0–1.8)
BASOPHILS # BLD: 0.02 K/UL (ref 0–0.12)
BILIRUB SERPL-MCNC: 0.9 MG/DL (ref 0.1–1.5)
BUN SERPL-MCNC: 16 MG/DL (ref 8–22)
CALCIUM ALBUM COR SERPL-MCNC: 9.2 MG/DL (ref 8.5–10.5)
CALCIUM SERPL-MCNC: 9.3 MG/DL (ref 8.5–10.5)
CHLORIDE SERPL-SCNC: 105 MMOL/L (ref 96–112)
CO2 SERPL-SCNC: 30 MMOL/L (ref 20–33)
CREAT SERPL-MCNC: 0.86 MG/DL (ref 0.5–1.4)
EKG IMPRESSION: NORMAL
EOSINOPHIL # BLD AUTO: 0.14 K/UL (ref 0–0.51)
EOSINOPHIL NFR BLD: 1.5 % (ref 0–6.9)
ERYTHROCYTE [DISTWIDTH] IN BLOOD BY AUTOMATED COUNT: 54.6 FL (ref 35.9–50)
GFR SERPLBLD CREATININE-BSD FMLA CKD-EPI: 83 ML/MIN/1.73 M 2
GLOBULIN SER CALC-MCNC: 3.1 G/DL (ref 1.9–3.5)
GLUCOSE SERPL-MCNC: 91 MG/DL (ref 65–99)
HCT VFR BLD AUTO: 40.5 % (ref 37–47)
HGB BLD-MCNC: 12.3 G/DL (ref 12–16)
IMM GRANULOCYTES # BLD AUTO: 0.03 K/UL (ref 0–0.11)
IMM GRANULOCYTES NFR BLD AUTO: 0.3 % (ref 0–0.9)
LYMPHOCYTES # BLD AUTO: 1.66 K/UL (ref 1–4.8)
LYMPHOCYTES NFR BLD: 17.5 % (ref 22–41)
MCH RBC QN AUTO: 27.1 PG (ref 27–33)
MCHC RBC AUTO-ENTMCNC: 30.4 G/DL (ref 33.6–35)
MCV RBC AUTO: 89.2 FL (ref 81.4–97.8)
MONOCYTES # BLD AUTO: 0.6 K/UL (ref 0–0.85)
MONOCYTES NFR BLD AUTO: 6.3 % (ref 0–13.4)
NEUTROPHILS # BLD AUTO: 7.02 K/UL (ref 2–7.15)
NEUTROPHILS NFR BLD: 74.2 % (ref 44–72)
NRBC # BLD AUTO: 0 K/UL
NRBC BLD-RTO: 0 /100 WBC
NT-PROBNP SERPL IA-MCNC: 220 PG/ML (ref 0–125)
PLATELET # BLD AUTO: 227 K/UL (ref 164–446)
PMV BLD AUTO: 11.6 FL (ref 9–12.9)
POTASSIUM SERPL-SCNC: 4.5 MMOL/L (ref 3.6–5.5)
PROT SERPL-MCNC: 7.2 G/DL (ref 6–8.2)
RBC # BLD AUTO: 4.54 M/UL (ref 4.2–5.4)
SODIUM SERPL-SCNC: 142 MMOL/L (ref 135–145)
TROPONIN T SERPL-MCNC: 20 NG/L (ref 6–19)
TROPONIN T SERPL-MCNC: 23 NG/L (ref 6–19)
WBC # BLD AUTO: 9.5 K/UL (ref 4.8–10.8)

## 2023-01-07 PROCEDURE — 84484 ASSAY OF TROPONIN QUANT: CPT

## 2023-01-07 PROCEDURE — 700111 HCHG RX REV CODE 636 W/ 250 OVERRIDE (IP): Performed by: EMERGENCY MEDICINE

## 2023-01-07 PROCEDURE — 36415 COLL VENOUS BLD VENIPUNCTURE: CPT

## 2023-01-07 PROCEDURE — 700101 HCHG RX REV CODE 250: Performed by: EMERGENCY MEDICINE

## 2023-01-07 PROCEDURE — 94640 AIRWAY INHALATION TREATMENT: CPT

## 2023-01-07 PROCEDURE — 83880 ASSAY OF NATRIURETIC PEPTIDE: CPT

## 2023-01-07 PROCEDURE — 85025 COMPLETE CBC W/AUTO DIFF WBC: CPT

## 2023-01-07 PROCEDURE — 93005 ELECTROCARDIOGRAM TRACING: CPT | Performed by: EMERGENCY MEDICINE

## 2023-01-07 PROCEDURE — 99285 EMERGENCY DEPT VISIT HI MDM: CPT

## 2023-01-07 PROCEDURE — 94760 N-INVAS EAR/PLS OXIMETRY 1: CPT

## 2023-01-07 PROCEDURE — 80053 COMPREHEN METABOLIC PANEL: CPT

## 2023-01-07 PROCEDURE — 96374 THER/PROPH/DIAG INJ IV PUSH: CPT

## 2023-01-07 PROCEDURE — 71045 X-RAY EXAM CHEST 1 VIEW: CPT

## 2023-01-07 PROCEDURE — 93005 ELECTROCARDIOGRAM TRACING: CPT

## 2023-01-07 RX ORDER — PREDNISONE 20 MG/1
40 TABLET ORAL DAILY
Qty: 10 TABLET | Refills: 0 | Status: SHIPPED | OUTPATIENT
Start: 2023-01-07 | End: 2023-01-12

## 2023-01-07 RX ORDER — IPRATROPIUM BROMIDE AND ALBUTEROL SULFATE 2.5; .5 MG/3ML; MG/3ML
3 SOLUTION RESPIRATORY (INHALATION)
Status: COMPLETED | OUTPATIENT
Start: 2023-01-07 | End: 2023-01-07

## 2023-01-07 RX ORDER — METHYLPREDNISOLONE SODIUM SUCCINATE 40 MG/ML
40 INJECTION, POWDER, LYOPHILIZED, FOR SOLUTION INTRAMUSCULAR; INTRAVENOUS ONCE
Status: COMPLETED | OUTPATIENT
Start: 2023-01-07 | End: 2023-01-07

## 2023-01-07 RX ADMIN — ALBUTEROL SULFATE 2.5 MG: 2.5 SOLUTION RESPIRATORY (INHALATION) at 20:57

## 2023-01-07 RX ADMIN — METHYLPREDNISOLONE SODIUM SUCCINATE 40 MG: 40 INJECTION, POWDER, FOR SOLUTION INTRAMUSCULAR; INTRAVENOUS at 20:48

## 2023-01-07 RX ADMIN — IPRATROPIUM BROMIDE AND ALBUTEROL SULFATE 3 ML: 2.5; .5 SOLUTION RESPIRATORY (INHALATION) at 22:11

## 2023-01-07 ASSESSMENT — FIBROSIS 4 INDEX: FIB4 SCORE: 0.74

## 2023-01-08 NOTE — ED PROVIDER NOTES
ED Provider Note    CHIEF COMPLAINT  Chief Complaint   Patient presents with    Shortness of Breath     X 2 days. Pt has productive cough. Hx COPD. Pt noncompliant with home meds.    Chest Pain     Sharp central CP x 2 days. Radiates to back. Pain reproducible on palpation.  Hx afib.       EXTERNAL RECORDS REVIEWED  Select: Outpatient Notes emergency department note dated December 25, 2022, patient seen for chest pain in this facility by Dr. Ciro Malone disposition with discharge.    HPI/ROS  LIMITATION TO HISTORY   Select: : None  OUTSIDE HISTORIAN(S):  Select: EMS   and Caregiver relates patient somewhat upset with her regular doctor and became anxious prior to 911 call.    Lorene Haro is a 47 y.o. female who presents for evaluation of dyspnea and discomfort in the chest.  Patient has history of atrial fibrillation as well as COPD and bipolar disorder.  Patient notes last 2 days that she has had worsening sensation of difficulty breathing, she has been more out of breath with exertion and at rest.  She also notes a sharp sensation of pain to the left chest with radiation to left upper extremity.  She has history of atrial fibrillation but no documented history of coronary artery disease.  Patient does take 3 L of oxygen chronically for her COPD, reassuringly she is not hypoxic on 3 L upon arrival.  She notes a nonproductive cough, no fever.    PAST MEDICAL HISTORY   has a past medical history of A-fib (HCC), Asthma, Bipolar 2 disorder (HCC), Chickenpox, Hypertension, On home oxygen therapy, Other psychological stress, Schizophrenic disorder (HCC), and Yeast infection of the skin (04/01/2021).    SURGICAL HISTORY   has a past surgical history that includes hysterectomy laparoscopy; colectomy; cholecystectomy; and knee arthroscopy (Left).    FAMILY HISTORY  Family History   Problem Relation Age of Onset    No Known Problems Mother     Cancer Sister        SOCIAL HISTORY  Social History     Tobacco Use     "Smoking status: Former     Packs/day: 0.25     Types: Cigarettes     Quit date: 10/12/2022     Years since quittin.2    Smokeless tobacco: Never   Vaping Use    Vaping Use: Never used   Substance and Sexual Activity    Alcohol use: Never    Drug use: Never    Sexual activity: Not Currently       CURRENT MEDICATIONS  Home Medications       Reviewed by Sara Talbot R.N. (Registered Nurse) on 23 at 1723  Med List Status: Not Addressed     Medication Last Dose Status   albuterol 108 (90 Base) MCG/ACT Aero Soln inhalation aerosol  Active   aripiprazole (ABILIFY) 30 MG tablet  Active   atorvastatin (LIPITOR) 20 MG Tab  Active   benzonatate (TESSALON) 100 MG Cap  Active   famotidine (PEPCID) 20 MG Tab  Active   fluticasone-salmeterol (ADVAIR) 100-50 MCG/ACT AEROSOL POWDER, BREATH ACTIVATED  Active   furosemide (LASIX) 20 MG Tab  Active   montelukast (SINGULAIR) 10 MG Tab  Active   potassium chloride SA (K-DUR) 10 MEQ Tab CR  Active   sennosides (SENOKOT) 8.6 MG Tab  Active   traZODone (DESYREL) 50 MG Tab  Active                    ALLERGIES  Allergies   Allergen Reactions    Penicillin G Rash and Itching     pt reports that she gets a rash all over her body and gets itchy    Amoxicillin Rash and Itching     Tolerates cephalosporins, pt reports that she gets a rash all over her body and gets itchy       PHYSICAL EXAM  VITAL SIGNS: /56   Pulse 67   Temp 36.5 °C (97.7 °F) (Temporal)   Resp 20   Ht 1.575 m (5' 2\")   Wt 125 kg (275 lb)   LMP  (LMP Unknown)   SpO2 98%   BMI 50.30 kg/m²    General: Alert, no acute distress  Skin: Warm, dry, normal for ethnicity  Head: Normocephalic, atraumatic  Neck: Trachea midline, no tenderness  Eye: PERRL, normal conjunctiva  ENMT: Oral mucosa moist, no pharyngeal erythema or exudate  Cardiovascular: Regular rate and rhythm, No murmur, Normal peripheral perfusion  Respiratory: Lungs diminished at both bases with expiratory wheezing more noticeable in the upper " lobes, respirations are non-labored while on supplemental oxygen, speaking full sentences, breath sounds are equal  Gastrointestinal: Soft, nontender, non distended  Musculoskeletal: No swelling, no deformity  Neurological: Alert and oriented to person, place, time, and situation  Lymphatics: No lymphadenopathy  Psychiatric: Cooperative, appropriate mood & affect     DIAGNOSTIC STUDIES / PROCEDURES  EKG  I have independently interpreted this EKG      LABS  Results for orders placed or performed during the hospital encounter of 01/07/23   CBC with Differential   Result Value Ref Range    WBC 9.5 4.8 - 10.8 K/uL    RBC 4.54 4.20 - 5.40 M/uL    Hemoglobin 12.3 12.0 - 16.0 g/dL    Hematocrit 40.5 37.0 - 47.0 %    MCV 89.2 81.4 - 97.8 fL    MCH 27.1 27.0 - 33.0 pg    MCHC 30.4 (L) 33.6 - 35.0 g/dL    RDW 54.6 (H) 35.9 - 50.0 fL    Platelet Count 227 164 - 446 K/uL    MPV 11.6 9.0 - 12.9 fL    Neutrophils-Polys 74.20 (H) 44.00 - 72.00 %    Lymphocytes 17.50 (L) 22.00 - 41.00 %    Monocytes 6.30 0.00 - 13.40 %    Eosinophils 1.50 0.00 - 6.90 %    Basophils 0.20 0.00 - 1.80 %    Immature Granulocytes 0.30 0.00 - 0.90 %    Nucleated RBC 0.00 /100 WBC    Neutrophils (Absolute) 7.02 2.00 - 7.15 K/uL    Lymphs (Absolute) 1.66 1.00 - 4.80 K/uL    Monos (Absolute) 0.60 0.00 - 0.85 K/uL    Eos (Absolute) 0.14 0.00 - 0.51 K/uL    Baso (Absolute) 0.02 0.00 - 0.12 K/uL    Immature Granulocytes (abs) 0.03 0.00 - 0.11 K/uL    NRBC (Absolute) 0.00 K/uL   Complete Metabolic Panel (CMP)   Result Value Ref Range    Sodium 142 135 - 145 mmol/L    Potassium 4.5 3.6 - 5.5 mmol/L    Chloride 105 96 - 112 mmol/L    Co2 30 20 - 33 mmol/L    Anion Gap 7.0 7.0 - 16.0    Glucose 91 65 - 99 mg/dL    Bun 16 8 - 22 mg/dL    Creatinine 0.86 0.50 - 1.40 mg/dL    Calcium 9.3 8.5 - 10.5 mg/dL    AST(SGOT) 13 12 - 45 U/L    ALT(SGPT) 14 2 - 50 U/L    Alkaline Phosphatase 114 (H) 30 - 99 U/L    Total Bilirubin 0.9 0.1 - 1.5 mg/dL    Albumin 4.1 3.2 - 4.9  g/dL    Total Protein 7.2 6.0 - 8.2 g/dL    Globulin 3.1 1.9 - 3.5 g/dL    A-G Ratio 1.3 g/dL   Troponins NOW   Result Value Ref Range    Troponin T 20 (H) 6 - 19 ng/L   CORRECTED CALCIUM   Result Value Ref Range    Correct Calcium 9.2 8.5 - 10.5 mg/dL   ESTIMATED GFR   Result Value Ref Range    GFR (CKD-EPI) 83 >60 mL/min/1.73 m 2   Troponin STAT   Result Value Ref Range    Troponin T 23 (H) 6 - 19 ng/L   proBrain Natriuretic Peptide, NT   Result Value Ref Range    NT-proBNP 220 (H) 0 - 125 pg/mL   EKG (NOW)   Result Value Ref Range    Report       [facility] Emergency Dept.    Test Date:  2023  Pt Name:    ADELINA MILLAN                Department: ER  MRN:        [id number]                      Room:  Gender:     Female                       Technician: [id number]  :        1975                   Requested By:ER TRIAGE PROTOCOL  Order #:    [id number]                    Reading MD:    Measurements  Intervals                                Axis  Rate:       61                           P:          60  WV:         137                          QRS:        47  QRSD:       100                          T:          9  QT:         381  QTc:        384    Interpretive Statements  Sinus rhythm  ST elev, probable normal early repol pattern  Baseline wander in lead(s) V4,V5  Compared to ECG 2022 23:02:02  ST (T wave) deviation now present     Troponin similar to previous values    RADIOLOGY  I have independently interpreted the diagnostic imaging associated with this visit and am waiting the final reading from the radiologist.   DX-CHEST-PORTABLE (1 VIEW)   Final Result         Mild interstitial prominence related to mild pulmonary edema.           COURSE & MEDICAL DECISION MAKING    ED Observation Status? Yes; I am placing the patient in to an observation status due to a diagnostic uncertainty as well as therapeutic intensity. Patient placed in observation status at 8:27 PM, 2023.  Patient  "treated with albuterol nebulized and Solu-Medrol 40 mg IV.  I have ordered metabolic/cardiac work-up for her.  Differential includes COPD exacerbation, ACS, lower respiratory infection    2131: Patient reassessed, feeling somewhat better.  She notes she is still feeling out of breath but improved after albuterol.  Reassuringly her oxygen saturation is 99% but she is still wheezing.  I ordered a second nebulizer dose, anticipate discharge given otherwise reassuring presentation.    INITIAL ASSESSMENT AND PLAN  Care Narrative: This is a pleasant 47-year-old female with history of COPD and oxygen dependence who presents for evaluation of chest pain and dyspnea.  Reassuringly she is not hypoxic throughout stay while on home oxygen.  She is feeling better after steroids and inhaled beta agonist.  Troponin is similar to previous values, EKG is nonischemic and reassuring.  No leukocytosis nor fever nor evidence of septicemia.  I suspect COPD exacerbation.  Given well in appearance and no hypoxia it is no indication for inpatient management.    ADDITIONAL PROBLEM LIST AND DISPOSITION  Patient Vitals for the past 24 hrs:   BP Temp Temp src Pulse Resp SpO2 Height Weight   01/07/23 2211 -- -- -- 67 20 98 % -- --   01/07/23 2200 108/56 36.5 °C (97.7 °F) Temporal 64 20 99 % -- --   01/07/23 2130 121/57 -- -- 62 20 100 % -- --   01/07/23 2100 129/65 -- -- (!) 55 (!) 26 100 % -- --   01/07/23 2057 -- -- -- (!) 52 18 100 % -- --   01/07/23 1930 138/74 36.2 °C (97.2 °F) Temporal 60 18 99 % -- --   01/07/23 1727 117/69 -- -- 60 15 -- -- --   01/07/23 1725 118/62 -- -- -- -- -- -- --   01/07/23 1715 -- -- -- -- -- -- 1.575 m (5' 2\") 125 kg (275 lb)   01/07/23 1646 133/73 37.3 °C (99.1 °F) Temporal 66 18 100 % -- --      Problem #1: Dyspnea with cough  Problem #2: Left-sided chest pain      Discussion of management with other QHP or appropriate source(s): Select: None     Escalation of care considered, and ultimately not performed: " none indicated.    Barriers to care at this time, including but not limited to: None    Decision tools and prescription drugs considered including, but not limited to: Select: HEART Score 2 .    HTN/IDDM FOLLOW UP:  The patient has known hypertension and is being followed by their primary care doctor        The patient will return for new or worsening symptoms and is stable at the time of discharge.    Patient has had high blood pressure while in the emergency department, felt likely secondary to medical condition. Counseled patient to monitor blood pressure at home and follow up with primary care physician.      DISPOSITION:  Patient will be discharged home in stable condition.    FOLLOW UP:  Fanny Davila M.D.  [address]    [place] [address]  [phone]    Schedule an appointment as soon as possible for a visit         OUTPATIENT MEDICATIONS:  Discharge Medication List as of 1/7/2023 10:18 PM        START taking these medications    Details   predniSONE (DELTASONE) 20 MG Tab Take 2 Tablets by mouth every day for 5 days., Disp-10 Tablet, R-0, Normal                FINAL DIAGNOSIS  1. Acute exacerbation of chronic obstructive pulmonary disease (COPD) ([facility])    2. Left-sided chest pain           Electronically signed by: Kirsty Perkins M.D., 1/7/2023 8:25 PM

## 2023-01-08 NOTE — ED NOTES
Rounded on Pt.    RT at bedside.    Updated Pt on plan of care. Pt verbalized understanding.  No acute distress at this time.  Will continue to monitor.

## 2023-01-08 NOTE — ED NOTES
Patient educated on discharge instructions, follow up appointments, prescriptions, and home care. Patient verbalized understanding. Patient wheeled to  omega.

## 2023-01-08 NOTE — ED TRIAGE NOTES
"Chief Complaint   Patient presents with    Shortness of Breath     X 2 days. Pt has productive cough. Hx COPD. Pt noncompliant with home meds.    Chest Pain     Sharp central CP x 2 days. Radiates to back. Pain reproducible on palpation.  Hx afib.     Pt BIBA from group home. Bilat bp's and pulses equal. Hx CHF. On 3L O2. Protocol ordered. Pt educated to inform staff of any changes.     /73   Pulse 66   Temp 37.3 °C (99.1 °F) (Temporal)   Resp 18   Ht 1.575 m (5' 2\")   Wt 125 kg (275 lb)   LMP  (LMP Unknown)   SpO2 100%   BMI 50.30 kg/m²     "

## 2023-01-12 ENCOUNTER — SPEECH THERAPY (OUTPATIENT)
Dept: SPEECH THERAPY | Facility: REHABILITATION | Age: 48
End: 2023-01-12
Attending: STUDENT IN AN ORGANIZED HEALTH CARE EDUCATION/TRAINING PROGRAM
Payer: MEDICAID

## 2023-01-12 ENCOUNTER — TELEPHONE (OUTPATIENT)
Dept: CARDIOLOGY | Facility: MEDICAL CENTER | Age: 48
End: 2023-01-12

## 2023-01-12 DIAGNOSIS — R13.12 OROPHARYNGEAL DYSPHAGIA: ICD-10-CM

## 2023-01-12 PROCEDURE — 92526 ORAL FUNCTION THERAPY: CPT

## 2023-01-12 ASSESSMENT — SOCIAL DETERMINANTS OF HEALTH (SDOH): SOCIAL_SUPPORT_SYSTEM: CAREGIVER

## 2023-01-12 NOTE — OP THERAPY DAILY TREATMENT
"  Outpatient Speech Therapy  DAILY TREATMENT     [facility] Speech [address]  [address]  [place] [address]  Phone:  [phone]  Fax:  [phone]    Date: 01/12/2023    Patient: Lorene Haro  YOB: 1975  MRN: [id number]     Time Calculation    Start time: 1114  Stop time: 1141 Time Calculation (min): 27 minutes         Chief Complaint: Dysphagia    Visit #: 2    Subjective:   Reason for Therapy:     Reason For Evaluation:  Dysphagia  Social Support:     Accompanied By:  Caregiver    Patient Mental Status:  Alert and Responsive  Therapy History:     Current Diet:  Regular Diet and Thin Liquids    Nutritional Concerns Restrictions and Allergies:  Room Air      Dentition:  Missing Dentition and Poor Dental/Oral Hygiene  Additional Subjective Comments:      Patient reported she has had a hard time swallowing and is still choking.    Objective:   Treatments/Interventions Performed:  Dysphagia treatment, Compensatory strategy training, Patient/Caregiver education and Home program  Objective Details:  1.  Patient will improve safety in swallow implementing compensatory swallow strategies with minimal verbal cues during therapist directed trials and per patient report 85% accuracy.   -Patient was introduced to safe swallowing strategies, with emphasis on small bites/sips, thorough chewing, and slow pace to allow for preparation for next bits.  Patient was able to \"teach back\" strategies, and applied them while eating a fruit cup.  Patient reported she will keep list by her while she is eating as a reminder.    2.  Patient will improve safety in swallow completing structured exercise addressing oral, pharyngeal phases of swallow completing to 85% accuracy given min verbal cues.    --Not formally addressed  3.  Patient will participate in MBSS to rule out aspiration and determine least restrictive, safest diet textures for PO intake.     --Still waiting for referral   "     Assessments    Speech Therapy Plan :   Prognosis & Recommendations  Impression Summary:  Patient actively participated in therapy activities.  Patient presents with oropharyngeal dysphagia characterized by reported difficulty swallowing, and complaints of coughing with liquids and food being stuck in throat during meals at home.  Large bolus size and pace of intake may contribute to swallowing difficulties.  Recommend Modified Barium Swallow Study to rule out aspiration and further assess pharyngeal swallow function.  Recommend continuation of regular diet with thin liquids using safe swallowing strategies. Patient would benefit from skilled speech therapy to provide education re: safe swallowing strategies and food modifications, as well as oral motor exercises to strengthen oral mechanism to improve safety of swallow.  Patient verbalized understanding and agreement with current plan of care.   Prognosis:  Good  Compensatory swallow strategies:  Upright position 90 degrees during meal and 45 minutes after meal, Reduce bolus size, Multiple swallows, Alternate liquids/solids and No talking while eating  Diet Recommendation:  Normal Consistency  Liquid Recommendation:  Thin  Goals  Short Term Goals:  1.  Patient will improve safety in swallow implementing compensatory swallow strategies with minimal verbal cues during therapist directed trials and per patient report 85% accuracy.   2.  Patient will improve safety in swallow completing structured exercise addressing oral, pharyngeal phases of swallow completing to 85% accuracy given min verbal cues.    3.  Patient will participate in MBSS to rule out aspiration and determine least restrictive, safest diet textures for PO intake.     Short Term Goal Duration (Weeks):  2-4 weeks  Long Term Goals:  Patient to consume the least restrictive diet to maintain adequate nutrition/hydration 100% as evidenced by no overt signs or symptoms of aspiration.        Long Term Goal  Duration (Weeks):  2-4 weeks  Therapy Recommendations  Recommendation:  Individual Speech Therapy and Dysphagia Treatment,  Planned Therapy Interventions:  Patient/Caregiver Education, Compensatory Strategy Training, Home Program and Dysphagia treatment,   Frequency:  1x week

## 2023-01-13 NOTE — TELEPHONE ENCOUNTER
Remote Transmission 1/12/2023:    1-AF/AFL episode on 1/11/2023@11:37pm lasting 48 mins.    Pt not on OAC.   Full report scanned into media.

## 2023-01-13 NOTE — TELEPHONE ENCOUNTER
Upon chart review, pt had other AFIB notifications from device, pt was scheduled to see [facility] on 11/28/22 however was no show. Per VR previous recommendations:    November 22, 2022  Oz Borrego M.D.     12:35 PM   Prince agree. Lets resume eliquis 5mg bid and refer to EP for consideration ablation. Thanks team!     Called and LM for pt to return call to further discuss.  Task sent to schedulers to set up FV with EP. FV with VR currently scheduled for 02/24/23.

## 2023-01-17 ENCOUNTER — APPOINTMENT (OUTPATIENT)
Dept: SLEEP MEDICINE | Facility: MEDICAL CENTER | Age: 48
End: 2023-01-17
Payer: MEDICAID

## 2023-01-17 DIAGNOSIS — I48.0 PAROXYSMAL ATRIAL FIBRILLATION (HCC): ICD-10-CM

## 2023-01-17 RX ORDER — METOPROLOL SUCCINATE 25 MG/1
25 TABLET, EXTENDED RELEASE ORAL DAILY
Qty: 90 TABLET | Refills: 3 | Status: SHIPPED | OUTPATIENT
Start: 2023-01-17 | End: 2023-04-17 | Stop reason: SDUPTHER

## 2023-01-17 NOTE — TELEPHONE ENCOUNTER
Oz Borrego M.D.  You; Abdirashid Stock Ass't 25 minutes ago (7:56 AM)     Hmm ok lets attempt to start eliquis again 5mg bid. Chadsvasc 2 for heart failure and gender. Add rate control metoprolol succinate 25mg daily. Lets bring back to office next available with anyone. Refer to EP. Would be ideal if she saw them. Thanks guys!        Attempted to call pt again this morning, LM requesting call back. Noted VR placed orders for Eliquis and Toprol per MAR.

## 2023-01-18 ENCOUNTER — SPEECH THERAPY (OUTPATIENT)
Dept: SPEECH THERAPY | Facility: REHABILITATION | Age: 48
End: 2023-01-18
Attending: STUDENT IN AN ORGANIZED HEALTH CARE EDUCATION/TRAINING PROGRAM
Payer: MEDICAID

## 2023-01-18 ENCOUNTER — TELEPHONE (OUTPATIENT)
Dept: CARDIOLOGY | Facility: MEDICAL CENTER | Age: 48
End: 2023-01-18
Payer: MEDICAID

## 2023-01-18 DIAGNOSIS — R13.12 OROPHARYNGEAL DYSPHAGIA: ICD-10-CM

## 2023-01-18 PROCEDURE — 92526 ORAL FUNCTION THERAPY: CPT

## 2023-01-18 ASSESSMENT — SOCIAL DETERMINANTS OF HEALTH (SDOH): SOCIAL_SUPPORT_SYSTEM: CAREGIVER

## 2023-01-18 NOTE — TELEPHONE ENCOUNTER
PA started    The following medications may be covered  If clinically appropriate, you may change the prescription. A therapeutic alternative may be available. Questions on alternatives? We're here to help. Contact [facility] at [phone]. You can also review [facility] formulary online or call them directly. Compare the original medication with possible alternatives:    PA REQUIREMENT  Eliquis 5MG tablets Required  Dabigatran Etexilate 75 Mg Cap Not Required  Enoxaparin 40 Mg/0.4 Ml Syrg Not Required  Savaysa 60 Mg Tab Required  Xarelto 20 Mg Tab Required  Xarelto DVT-PE Treat 30D Start Required  Terms of service apply. Alternatives and PA Requirements listed above are based on third party available formulary data and may not apply to all plans. Check patient's specific plan formulary.

## 2023-01-18 NOTE — OP THERAPY DAILY TREATMENT
Outpatient Speech Therapy  DAILY TREATMENT     [facility] Speech [address]  [address]  [place] [address]  Phone:  [phone]  Fax:  [phone]    Date: 01/18/2023    Patient: Lorene Haro  YOB: 1975  MRN: [id number]     Time Calculation    Start time: 1029  Stop time: 1052 Time Calculation (min): 23 minutes         Chief Complaint: Dysphagia    Visit #: 3    Please refer to discharge summary

## 2023-01-18 NOTE — TELEPHONE ENCOUNTER
PA approved through 1/18/2024.     ADELINA MILLAN Key: [id number] - PA Case ID: [id number] help? Call us at [phone]  Outcome  Approvedtoday  PA has been Approved. PA End Date: 01/18/2024  Drug  Eliquis 5MG tablets  Form  [place] Medicaid Electronic PA Form

## 2023-01-18 NOTE — PROGRESS NOTES
Pulmonary Clinic Note    Date of Visit: 1/26/2023     Chief Complaint:  Chief Complaint   Patient presents with    Follow-Up     Last seen 8/5/22 HENRIETTA Weiss       HPI:   Lorene Haro is a very pleasant 47 y.o. year old female former smoker (20+ pack-years, quit in 2022), with a PMHx of chronic back pain with sciatica, COVID-19 pneumonia in October 2021, atrial fibrillation, anxiety, secondary hypercoagulable state, lower extremity edema, bipolar/schizophrenia, obesity hypoventilation syndrome  who presented to the Pulmonary Clinic for a regular follow up. Last seen in the office on 6/1/2022 with RUPALI Medeiros.     Patient is followed by the pulmonary office for the next restrictive obstructive disease.  PFTs in 2021 show an FVC of 1.45 L or 46%, FEV1 1.24 L or 48%, FEV1/FVC 86%, %, TLC 78%, and DLCO 85% predicted, with a positive bronchodilator response.  CTA in September and May 2022 did not show any abnormalities.  Echocardiogram in March 2022 showed RVSP of 35 mmHg.  Patient does carry a diagnosis of KATHIA and is currently on AVAPS machine being followed by the sleep center.  She has frequent ER visits for chest pain and/or shortness of breath.    Interval Events:  1/26/2023-   Patient presents with care team from [facility].  Patient was previously living by herself.  She has been seen in the ER as well as urgent care for increased shortness of breath.  Patient has intermittently been using her Advair, which she is largely attributed to lack of refills.  She will use albuterol 1-2 times a week.  She also was previously on 20 mg of Lasix, which she again has not been able to use because she does not have refills.  Symptomatically, she is short of breath with mMRC of 2-3.  Has a cough that is worse at night when she lays down, occasional sputum production that is yellow, and denies any wheezing.  Patient was previously ordered an AVAPS machine in August, which was sent to [facility].  Patient has  never received this machine.    Exacerbations this year:  0     Current medication regimen:  Advair and albuterol    Oxygen use:   2 LPM    MMRC Grade: 2-3    Past Medical History:   Diagnosis Date    A-fib (HCC)     Asthma     Bipolar 2 disorder (HCC)     Chickenpox     Hypertension     On home oxygen therapy     Other psychological stress     Schizophrenic disorder (HCC)     Yeast infection of the skin 2021     Past Surgical History:   Procedure Laterality Date    CHOLECYSTECTOMY      COLECTOMY      HYSTERECTOMY LAPAROSCOPY      KNEE ARTHROSCOPY Left      Social History     Socioeconomic History    Marital status: Single     Spouse name: Not on file    Number of children: Not on file    Years of education: Not on file    Highest education level: Not on file   Occupational History    Not on file   Tobacco Use    Smoking status: Former     Packs/day: 0.25     Types: Cigarettes     Quit date: 10/12/2022     Years since quittin.2    Smokeless tobacco: Never   Vaping Use    Vaping Use: Never used   Substance and Sexual Activity    Alcohol use: Never    Drug use: Never    Sexual activity: Not Currently   Other Topics Concern    Not on file   Social History Narrative    ** Merged History Encounter **         From [place]     Social Determinants of Health     Financial Resource Strain: Medium Risk    Difficulty of Paying Living Expenses: Somewhat hard   Food Insecurity: Food Insecurity Present    Worried About Running Out of Food in the Last Year: Often true    Ran Out of Food in the Last Year: Often true   Transportation Needs: No Transportation Needs    Lack of Transportation (Medical): No    Lack of Transportation (Non-Medical): No   Physical Activity: Not on file   Stress: Not on file   Social Connections: Not on file   Intimate Partner Violence: Not on file   Housing Stability: Not on file        Family History   Problem Relation Age of Onset    No Known Problems Mother     Cancer Sister      Current  "Outpatient Medications on File Prior to Visit   Medication Sig Dispense Refill    apixaban (ELIQUIS) 5mg Tab Take 1 Tablet by mouth 2 times a day. 180 Tablet 3    metoprolol SR (TOPROL XL) 25 MG TABLET SR 24 HR Take 1 Tablet by mouth every day. 90 Tablet 3    aripiprazole (ABILIFY) 30 MG tablet Take 1 Tablet by mouth every morning. Indications: Major Depressive Disorder 30 Tablet 0    atorvastatin (LIPITOR) 20 MG Tab Take 1 Tablet by mouth at bedtime. 30 Tablet 0    montelukast (SINGULAIR) 10 MG Tab Take 1 Tablet by mouth every evening. 30 Tablet 0    sennosides (SENOKOT) 8.6 MG Tab Take 1 Tablet by mouth 1 time a day as needed (constipation). 30 Tablet 0    traZODone (DESYREL) 50 MG Tab Take 1 Tablet by mouth at bedtime. 30 Tablet 3    benzonatate (TESSALON) 100 MG Cap Take 1 Capsule by mouth 3 times a day as needed for Cough. (Patient not taking: Reported on 12/6/2022) 60 Capsule 0     No current facility-administered medications on file prior to visit.     Allergies: Penicillin g and Amoxicillin    ROS:   Review of Systems   Constitutional:  Negative for chills, diaphoresis, fever and malaise/fatigue.   HENT:  Negative for congestion and sinus pain.    Respiratory:  Positive for cough, sputum production (Occassional, yellow) and shortness of breath. Negative for hemoptysis and wheezing.    Cardiovascular:  Positive for leg swelling. Negative for chest pain and palpitations.   Gastrointestinal:  Positive for heartburn. Negative for diarrhea, nausea and vomiting.   Musculoskeletal:  Negative for falls and myalgias.   Neurological:  Negative for dizziness, weakness and headaches.     Vitals:  /76 (BP Location: Right arm, Patient Position: Sitting, BP Cuff Size: Adult)   Pulse 68   Resp 16   Ht 1.549 m (5' 1\")   Wt 122 kg (270 lb)   SpO2 92%     Physical Exam:  Physical Exam  Constitutional:       General: She is not in acute distress.     Appearance: Normal appearance. She is not ill-appearing, " toxic-appearing or diaphoretic.   Cardiovascular:      Rate and Rhythm: Normal rate and regular rhythm.      Heart sounds: No murmur heard.    No friction rub. No gallop.   Pulmonary:      Effort: No respiratory distress.      Breath sounds: Normal breath sounds. No stridor. No wheezing, rhonchi or rales.   Musculoskeletal:         General: Swelling present.      Right lower leg: Edema (2-3+) present.      Left lower leg: Edema (2-3+) present.   Skin:     General: Skin is warm.   Neurological:      General: No focal deficit present.      Mental Status: She is alert and oriented to person, place, and time.   Psychiatric:         Mood and Affect: Mood normal.         Behavior: Behavior normal.         Thought Content: Thought content normal.         Judgment: Judgment normal.       Laboratory Data:  Multiox: (Date: 1/16/2023)-      PFTs: (Date: 9/21/2021)-      Impression:  Baseline spirometry shows significant reduction in FVC at 1.45 L or 46% predicted and FEV1 at 1.24 L or 48% predicted with FEV1/FVC ratio of 86 suggesting restriction.  There is significant bronchodilator response with postbronchodilator FEV1 of 1.48 L or 58% predicted.  Total lung capacity is just below lower limits of normal at 3.61 L or 78% predicted.  Diffusion capacity is within normal limits at 85% predicted.  Pulmonary function testing shows mixed restrictive obstructive defect with significant bronchodilator response.  This could be consistent with COPD in a patient with morbid obesity.  Correlate clinically and with imaging    CXR: (Date: 1/7/2023)-  Impression:  Mild interstitial prominence related to mild pulmonary edema.    CT Chest: (Date: 9/2/2022)-  Impression:  1.  No aortic aneurysm or dissection.  2.  Hepatosplenomegaly    ECHO: (Date: 2/27/2022)-  Impression:  Compared to the prior echo on 03/20/21, left ventricular systolic   function is now hyperdynamic with Doppler study showing mild aortic   stenosis.  The left ventricle is  mildly dilated.  Mild concentric left ventricular hypertrophy.  Hyperdynamic left ventricular systolic function with estimated ejection   fraction 70%.  No regional wall motion abnormality noted.  Evidence of increased intracavitary gradient with peak velocity 2.6   m/sec.  Aortic valve is not well visualized.  Mild aortic stenosis based on Doppler. Transvalvular gradients are -   Peak:  27 mmHg, Mean: 14 mmHg.  Estimated right ventricular systolic pressure is 35 mmHg.      Assessment and Plan:    Problem List Items Addressed This Visit       Mixed restrictive and obstructive lung disease (HCC)     PFTs in 2021 show an FVC of 1.45 L or 46%, FEV1 1.24 L or 48%, FEV1/FVC 86%, %, TLC 78%, and DLCO 85% predicted, with a positive bronchodilator response.  Patient recently quit smoking in 2022.  Patient has been intermittently using Advair.  She also use albuterol 1-2 times a week.  Restriction is most likely related to her BMI.  Symptomatically, she is short of breath with mMRC of 2-3, has a cough with occasional yellow sputum.  -- Continue Advair  -- Continue albuterol if needed         Relevant Medications    albuterol 108 (90 Base) MCG/ACT Aero Soln inhalation aerosol    fluticasone-salmeterol (ADVAIR) 100-50 MCG/ACT AEROSOL POWDER, BREATH ACTIVATED    Lower extremity edema     Patient has lower extremity swelling, 2-3+.  Patient was previously on Lasix but does not have any refills.  She was due to see [facility] yesterday, but had be rescheduled until March.  Her last potassium was 4.5.  -- Lasix 20mg and Potassium chloride 20mEq once daily  -- Daily weights, monitoring for daily increases in weight (4+ pounds)         Relevant Medications    furosemide (LASIX) 20 MG Tab    potassium chloride (KLOR-CON) 20 MEQ Pack    Chronic hypoxic respiratory failure (HCC)     Patient intermittently wears 2 LPM of oxygen.  She wears 2 LPM of at night.  Multi ox today did not show the need for oxygen.   --  Continue to use 2 LPM of oxygen at night until AVAPS machine can be obtained         Relevant Orders    Multiple Oximetry (Completed)    Gastroesophageal reflux disease without esophagitis     Patient states that her cough is worse at night when she lays down.  She states she will experience intermittent heartburn.  She was previously on Pepcid 20 mg twice daily, but she does not have any refills.  -- Pepcid 20 mg twice daily  -- Lifestyle modifications such as: keep head of the bed elevated at 30 degrees, avoid cafeinated drinks when possible even during the day, avoid eating anything 2 hours prior to going to bed           Relevant Medications    famotidine (PEPCID) 20 MG Tab    Morbid obesity with alveolar hypoventilation (HCC)     severe obstructive sleep apnea with significant hypoxia previously on AVAPS.  Previous order was sent back in August 2022 to [facility], but patient never received the machine.  -- AVAPS order [facility] to [facility]         Nail overgrowth     Referral sent to podiatry due to the [facility] nurse asking for a referral due to her nails needing to be cut.         Relevant Orders    Referral to Podiatry    Dependence on nocturnal oxygen therapy     As above.          Diagnostic studies have been reviewed with the patient.    Return in about 3 months (around 4/26/2023), or if symptoms worsen or fail to improve, for Pulm- 2 months & Sleep- 3 months.     This note was generated using voice recognition software which has a chance of producing errors of grammar and possibly content.  I have made every reasonable attempt to find and correct any obvious errors, but it should be expected that some may not be found prior to finalization of this note.    Time spent in record review prior to patient arrival, reviewing results, and in face-to-face encounter totaled 55 min.  __________  HENRIETTA Wu  Pulmonary Medicine  [facility] hospital

## 2023-01-18 NOTE — OP THERAPY DISCHARGE SUMMARY
Outpatient Speech Therapy  DISCHARGE SUMMARY NOTE      [facility] Speech [place] [address]  [address]  [facility] [address]  Phone:  [phone]  Fax:  [phone]    Date of Visit: 01/18/2023    Patient: Lroene Haro  YOB: 1975  MRN: [id number]     Referring Provider: Cayetano Bloom M.D.  [address] [place] [address]   Referring Diagnosis: Dysphagia, oropharyngeal phase [R13.12]     Visit #: 3    Time Calculation    Start time: 1029  Stop time: 1052 Time Calculation (min): 23 minutes           Chief Complaint: Dysphagia    Visit Diagnoses     ICD-10-CM   1. Oropharyngeal dysphagia  R13.12       Subjective:   Reason for Therapy:     Reason For Evaluation:  Dysphagia  Social Support:     Accompanied By:  Caregiver    Patient Mental Status:  Alert and Responsive  Therapy History:     Current Diet:  Regular Diet and Thin Liquids    Nutritional Concerns Restrictions and Allergies:  Room Air      Dentition:  Missing Dentition and Poor Dental/Oral Hygiene  Additional Subjective Comments:      Patient reported she has been paying attention to safe swallowing strategies, and has been doing better with swallowing.    Objective:   Treatments/Interventions Performed:  Dysphagia treatment, Compensatory strategy training, Patient/Caregiver education and Home program  Objective Details:  1.  Patient will improve safety in swallow implementing compensatory swallow strategies with minimal verbal cues during therapist directed trials and per patient report 85% accuracy.   -Patient has been implementing safe swallowing strategies, with emphasis on small bites/sips, thorough chewing, and slow pace to allow for preparation for next bite.  Patient has been keeping a habit tracker log and has only had one incidence of choking since last appointment.  She reported she ate a chip too fast.  2.  Patient will improve safety in swallow completing structured exercise addressing oral,  pharyngeal phases of swallow completing to 85% accuracy given min verbal cues.    --Patient was able to demonstrate lingual exercises against resistance, as well as effortful swallow, mendelsohn, and mickey exercises.  3.  Patient will participate in MBSS to rule out aspiration and determine least restrictive, safest diet textures for PO intake.     --Still waiting for referral    Assessments    Speech Therapy Plan :   Prognosis & Recommendations  Impression Summary:  Patient actively participated in therapy activities.  Patient presents with oropharyngeal dysphagia characterized by reported difficulty swallowing, with improvement in coughing with liquids and food being stuck in throat during meals at home.  Large bolus size and pace of intake may contribute to swallowing difficulties, and attention to safe swallowing strategies has improved swallow.  Recommend Modified Barium Swallow Study to rule out aspiration and further assess pharyngeal swallow function if swallowing issues continue.  Recommend continuation of regular diet with thin liquids using safe swallowing strategies.  Goals for therapy have been met and Patient feels ready to discharge at this time.  Patient was instructed to contact doctor if new symptoms arise for new speech therapy referral.    Prognosis:  Good  Compensatory swallow strategies:  Upright position 90 degrees during meal and 45 minutes after meal, Reduce bolus size, Multiple swallows, Alternate liquids/solids and No talking while eating  Diet Recommendation:  Normal Consistency  Liquid Recommendation:  Thin  Goals  Short Term Goals:  1.  Patient will improve safety in swallow implementing compensatory swallow strategies with minimal verbal cues during therapist directed trials and per patient report 85% accuracy.   2.  Patient will improve safety in swallow completing structured exercise addressing oral, pharyngeal phases of swallow completing to 85% accuracy given min verbal cues.     3.  Patient will participate in MBSS to rule out aspiration and determine least restrictive, safest diet textures for PO intake.     Long Term Goals:  Patient to consume the least restrictive diet to maintain adequate nutrition/hydration 100% as evidenced by no overt signs or symptoms of aspiration.        Therapy Recommendations  Recommendation:  No further speech therapy indicated at this time,  Planned Therapy Interventions:  Home Program,       Functional Assessment Used

## 2023-01-22 ENCOUNTER — APPOINTMENT (OUTPATIENT)
Dept: RADIOLOGY | Facility: MEDICAL CENTER | Age: 48
End: 2023-01-22
Attending: EMERGENCY MEDICINE
Payer: MEDICAID

## 2023-01-22 ENCOUNTER — NON-PROVIDER VISIT (OUTPATIENT)
Dept: CARDIOLOGY | Facility: MEDICAL CENTER | Age: 48
End: 2023-01-22
Payer: MEDICAID

## 2023-01-22 ENCOUNTER — HOSPITAL ENCOUNTER (EMERGENCY)
Facility: MEDICAL CENTER | Age: 48
End: 2023-01-22
Attending: EMERGENCY MEDICINE
Payer: MEDICAID

## 2023-01-22 VITALS
RESPIRATION RATE: 18 BRPM | OXYGEN SATURATION: 100 % | DIASTOLIC BLOOD PRESSURE: 61 MMHG | TEMPERATURE: 97.9 F | SYSTOLIC BLOOD PRESSURE: 111 MMHG | HEART RATE: 57 BPM

## 2023-01-22 DIAGNOSIS — R05.9 COUGH, UNSPECIFIED TYPE: ICD-10-CM

## 2023-01-22 DIAGNOSIS — J44.9 CHRONIC OBSTRUCTIVE PULMONARY DISEASE, UNSPECIFIED COPD TYPE (HCC): ICD-10-CM

## 2023-01-22 LAB
EKG IMPRESSION: NORMAL
FLUAV RNA SPEC QL NAA+PROBE: NEGATIVE
FLUBV RNA SPEC QL NAA+PROBE: NEGATIVE
RSV RNA SPEC QL NAA+PROBE: NEGATIVE
S PYO DNA SPEC NAA+PROBE: NOT DETECTED
SARS-COV-2 RNA RESP QL NAA+PROBE: NOTDETECTED
SPECIMEN SOURCE: NORMAL

## 2023-01-22 PROCEDURE — 93005 ELECTROCARDIOGRAM TRACING: CPT

## 2023-01-22 PROCEDURE — 87651 STREP A DNA AMP PROBE: CPT

## 2023-01-22 PROCEDURE — 71046 X-RAY EXAM CHEST 2 VIEWS: CPT

## 2023-01-22 PROCEDURE — 93298 REM INTERROG DEV EVAL SCRMS: CPT | Performed by: INTERNAL MEDICINE

## 2023-01-22 PROCEDURE — 99284 EMERGENCY DEPT VISIT MOD MDM: CPT

## 2023-01-22 PROCEDURE — 0241U HCHG SARS-COV-2 COVID-19 NFCT DS RESP RNA 4 TRGT MIC: CPT

## 2023-01-22 PROCEDURE — C9803 HOPD COVID-19 SPEC COLLECT: HCPCS | Performed by: EMERGENCY MEDICINE

## 2023-01-22 PROCEDURE — 93005 ELECTROCARDIOGRAM TRACING: CPT | Performed by: EMERGENCY MEDICINE

## 2023-01-22 NOTE — ED TRIAGE NOTES
Chief Complaint   Patient presents with    Flu Like Symptoms    Shortness of Breath     BIB EMS for SOB, flu like symptoms x several days. Pt recently seen here for same earlier this month.  Lives at group home and not improving.    /68   Pulse 67   Temp 36.6 °C (97.9 °F) (Temporal)   Resp 18   SpO2 99%   Pt informed of wait times. Educated on triage process.  Asked to return to triage RN for any new or worsening of symptoms. Thanked for patience.

## 2023-01-22 NOTE — ED NOTES
Patient wheeled to [address]. SPO2 100% on her home 3 liters of oxygen. Turned down to 2 liters. Up for ERP

## 2023-01-22 NOTE — ED NOTES
Patient discharged per order. Oral and written discharge instructions reviewed with patient and caregiver. All belongings accounted for and taken with patient. Questions answered, and patient agrees with discharge plan. Patient escorted to lobby. Encouraged to follow up with PCP.

## 2023-01-22 NOTE — ED PROVIDER NOTES
"  ER Provider Note    Scribed for Aron Delacruz M.d. by Shawn Bolaños. 1/22/2023  1:23 PM    Primary Care Provider: Pcp Pt States None    CHIEF COMPLAINT  Chief Complaint   Patient presents with    Flu Like Symptoms    Shortness of Breath     LIMITATION TO HISTORY   Select: : None    HPI/ROS    Lorene Haro is a 47 y.o. female who presents to the ED for cough onset 6 days. The patient states that she was seen at the ED earlier in the month for the same symptoms. She also states \"I just wants to make sure I don't have walking pneumonia\". She reports being on 3L oxygen at her group home. She states sick contacts at the group home as well. She also experiences rhinorrhea, shortness of breath, and sore throat. Patient denies fever. No alleviating or exacerbating factors reported. Patient has a past medical history of Hypertension, A-fib, and Asthma.    PAST MEDICAL HISTORY  Past Medical History:   Diagnosis Date    A-fib (HCC)     Asthma     Bipolar 2 disorder (HCC)     Chickenpox     Hypertension     On home oxygen therapy     Other psychological stress     Schizophrenic disorder (HCC)     Yeast infection of the skin 04/01/2021       SURGICAL HISTORY  Past Surgical History:   Procedure Laterality Date    CHOLECYSTECTOMY      COLECTOMY      HYSTERECTOMY LAPAROSCOPY      KNEE ARTHROSCOPY Left        FAMILY HISTORY  Family History   Problem Relation Age of Onset    No Known Problems Mother     Cancer Sister        SOCIAL HISTORY   reports that she quit smoking about 3 months ago. Her smoking use included cigarettes. She smoked an average of .25 packs per day. She has never used smokeless tobacco. She reports that she does not drink alcohol and does not use drugs.    CURRENT MEDICATIONS  Discharge Medication List as of 1/22/2023  3:46 PM        CONTINUE these medications which have NOT CHANGED    Details   apixaban (ELIQUIS) 5mg Tab Take 1 Tablet by mouth 2 times a day., Disp-180 Tablet, R-3, Normal    "   metoprolol SR (TOPROL XL) 25 MG TABLET SR 24 HR Take 1 Tablet by mouth every day., Disp-90 Tablet, R-3, Normal      famotidine (PEPCID) 20 MG Tab Take 1 Tablet by mouth 2 times a day., Disp-60 Tablet, R-0, Normal      albuterol 108 (90 Base) MCG/ACT Aero Soln inhalation aerosol Inhale 2 Puffs every 6 hours as needed for Shortness of Breath., Disp-18 g, R-3, Print Rx Paper      aripiprazole (ABILIFY) 30 MG tablet Take 1 Tablet by mouth every morning. Indications: Major Depressive Disorder, Disp-30 Tablet, R-0, Print Rx Paper      atorvastatin (LIPITOR) 20 MG Tab Take 1 Tablet by mouth at bedtime., Disp-30 Tablet, R-0, Print Rx Paper      furosemide (LASIX) 20 MG Tab Take 1 Tablet by mouth every day., Disp-90 Tablet, R-0, Print Rx Paper      montelukast (SINGULAIR) 10 MG Tab Take 1 Tablet by mouth every evening., Disp-30 Tablet, R-0, Print Rx Paper      potassium chloride SA (K-DUR) 10 MEQ Tab CR Take 1 Tablet by mouth every day., Disp-30 Tablet, R-0, Print Rx Paper      benzonatate (TESSALON) 100 MG Cap Take 1 Capsule by mouth 3 times a day as needed for Cough., Disp-60 Capsule, R-0, Normal      fluticasone-salmeterol (ADVAIR) 100-50 MCG/ACT AEROSOL POWDER, BREATH ACTIVATED Inhale 1 Puff every 12 hours., Disp-60 Each, R-1, Normal      sennosides (SENOKOT) 8.6 MG Tab Take 1 Tablet by mouth 1 time a day as needed (constipation)., Disp-30 Tablet, R-0, Normal      traZODone (DESYREL) 50 MG Tab Take 1 Tablet by mouth at bedtime., Disp-30 Tablet, R-3, Normal             ALLERGIES  Penicillin g and Amoxicillin    PHYSICAL EXAM  /63   Pulse 60   Temp 36.6 °C (97.9 °F) (Temporal)   Resp 18   LMP  (LMP Unknown)   SpO2 99%     Constitutional: Well developed, Well nourished, No acute distress, Non-toxic appearance.   HENT: Normocephalic, Atraumatic, Bilateral external ears normal.  Eyes:  conjunctiva are normal.   Neck: Supple.   Cardiovascular: Regular rate and rhythm without murmurs gallops or rubs.   Thorax &  Lungs: No respiratory distress. Breathing comfortably. Lungs are clear to auscultation bilaterally, there are no wheezes no rales. Chest wall is nontender.  Abdomen: Soft, Abdomen obese, non tender.  Skin: Warm, Dry, No erythema,   Back: No tenderness, No CVA tenderness.  Musculoskeletal: Good range of motion in all major joints. No tenderness to palpation or major deformities noted. Intact distal pulses, no clubbing, no cyanosis, no edema,   Neurologic: Alert & oriented x 3, Normal motor function, Normal sensory function, No focal deficits noted.   Psychiatric: Affect normal, Judgment normal, Mood normal.     DIAGNOSTIC STUDIES & PROCEDURES    Labs:   Results for orders placed or performed during the hospital encounter of 23   Group A Strep by PCR    Specimen: Throat   Result Value Ref Range    Group A Strep by PCR Not Detected Not Detected   CoV-2, FLU A/B, and RSV by PCR (2-4 Hours CEPHEID) : Collect NP swab in VTM    Specimen: Respirate   Result Value Ref Range    Influenza virus A RNA Negative Negative    Influenza virus B, PCR Negative Negative    RSV, PCR Negative Negative    SARS-CoV-2 by PCR NotDetected     SARS-CoV-2 Source NP Swab    EKG (NOW)   Result Value Ref Range    Report       [facility] Emergency Dept.    Test Date:  2023  Pt Name:    ADELINA MILLAN                Department: ER  MRN:        [id number]                      Room:  Gender:     Female                       Technician: [id number]  :        1975                   Requested By:ER TRIAGE PROTOCOL  Order #:    [id number]                    Reading MD: ROBIN GARCIA MD    Measurements  Intervals                                Axis  Rate:       55                           P:          6  ME:         145                          QRS:        38  QRSD:       78                           T:          3  QT:         391  QTc:        374    Interpretive Statements  Sinus bradycardia  Low voltage, precordial  leads  Borderline T abnormalities, inferior leads  Compared to ECG 01/07/2023 16:49:35  Low QRS voltage now present  T-wave abnormality now present  Sinus rhythm no longer present  ST (T wave) deviation no longer present  No acute changes  Electronically Signed On 1- 1 5:29:05 PST by ROBIN GARCIA MD       All labs reviewed by me.    EKG:   I have independently interpreted this EKG as seen above.     Radiology:   The attending Emergency Physician has independently interpreted the diagnostic imaging associated with this visit and is awaiting the final reading from the radiologist, which will be displayed below.    DX-CHEST-2 VIEWS   Final Result      Stable cardiac silhouette enlargement without consolidation detected           COURSE & MEDICAL DECISION MAKING    1:23 PM - Patient seen and evaluated at bedside. Ordered DX-Chest, EKG, and labs to evaluate. She understands and agrees to the plan of care.    ED Observation Status? Yes; I am placing the patient in to an observation status due to a diagnostic uncertainty as well as therapeutic intensity. Patient placed in observation status at 1:23 PM, 1/22/2023.     Observation plan is as follows: Patient presents for evaluation.  Clinically the patient is normally on 3 L of oxygen we did decrease down to 2 L and oxygen saturation have remained the same.  At this point the clinically the patient is actually improved from her normal baseline so I do not feel any further evaluation is necessary other than the x-ray and viral studies.    Upon Reevaluation, the patient's condition has: Improved; and will be discharged.    Patient discharged from ED Observation status at 3:28 (Time) 01/22/2023 (Date).     3:26 PM - Patient was reevaluated at bedside. Discussed lab and radiology results with the patient and informed them of the lab results, including her negative results for RSV, Influenza, and COVID-19. Patient had the opportunity to ask any questions. The plan for  discharge was discussed with them and they were told to return for any new or worsening symptoms. She was also informed of the plans for follow up. Patient is understanding and agreeable to the plan for discharge.    INITIAL ASSESSMENT AND PLAN  Care Narrative: Presents for evaluation.  Initial concerns were for pneumonia atypical pneumonia viral infection.  The viral panel was negative chest x-ray is normal.  Could very well be just a viral upper URI.  At this point I recommend to continue with her COPD treatment she currently has no wheezes I did not do any neb treatments at this time and I do feel the patient is stable for discharge.    ADDITIONAL PROBLEM LIST AND DISPOSITION  1.  URI up as above  2.  COPD recommended continue outpatient treatments follow-up with a primary care physician as above  3.  Patient has had a group home        HTN/IDDM FOLLOW UP:  The patient has known hypertension and is being followed by their primary care doctor    The patient will return for new or worsening symptoms and is stable at the time of discharge.    The patient is referred to a primary physician for blood pressure management, diabetic screening, and for all other preventative health concerns.    DISPOSITION:  Patient will be discharged home in stable condition.    FOLLOW UP:  [facility]NAL MED[facility]  [address] [place] [id number]  [phone]  Schedule an appointment as soon as possible for a visit in 1 week  For re-check, Return if any symptoms worsen    FINAL IMPRESSION   1. Cough, unspecified type    2. Chronic obstructive pulmonary disease, unspecified COPD type (HCC)      Shawn HARRIS (Ade), am scribing for, and in the presence of, Aron Delacruz M.D..    Electronically signed by: Shawn Bolaños (Ade), 1/22/2023    Aron HARRIS M.D. personally performed the services described in this documentation, as scribed by Shawn Bolaños in my presence, and it is both accurate and complete.    The  note accurately reflects work and decisions made by me.  Aron Delacruz M.D.  1/22/2023  5:26 PM

## 2023-01-23 NOTE — CARDIAC REMOTE MONITOR - SCAN
Device transmission reviewed. Device demonstrated appropriate function.       Electronically Signed by: Nader Barraza M.D.    1/23/2023  4:33 PM

## 2023-01-25 ENCOUNTER — APPOINTMENT (OUTPATIENT)
Dept: SPEECH THERAPY | Facility: REHABILITATION | Age: 48
End: 2023-01-25
Attending: STUDENT IN AN ORGANIZED HEALTH CARE EDUCATION/TRAINING PROGRAM
Payer: MEDICAID

## 2023-01-26 ENCOUNTER — ANTICOAGULATION VISIT (OUTPATIENT)
Dept: VASCULAR LAB | Facility: MEDICAL CENTER | Age: 48
End: 2023-01-26
Attending: INTERNAL MEDICINE
Payer: MEDICAID

## 2023-01-26 ENCOUNTER — OFFICE VISIT (OUTPATIENT)
Dept: SLEEP MEDICINE | Facility: MEDICAL CENTER | Age: 48
End: 2023-01-26
Payer: MEDICAID

## 2023-01-26 VITALS
BODY MASS INDEX: 50.98 KG/M2 | HEART RATE: 68 BPM | SYSTOLIC BLOOD PRESSURE: 122 MMHG | OXYGEN SATURATION: 92 % | DIASTOLIC BLOOD PRESSURE: 76 MMHG | HEIGHT: 61 IN | RESPIRATION RATE: 16 BRPM | WEIGHT: 270 LBS

## 2023-01-26 VITALS — SYSTOLIC BLOOD PRESSURE: 131 MMHG | DIASTOLIC BLOOD PRESSURE: 52 MMHG | HEART RATE: 55 BPM

## 2023-01-26 DIAGNOSIS — L60.2 NAIL OVERGROWTH: ICD-10-CM

## 2023-01-26 DIAGNOSIS — K21.9 GASTROESOPHAGEAL REFLUX DISEASE WITHOUT ESOPHAGITIS: ICD-10-CM

## 2023-01-26 DIAGNOSIS — R60.0 LOWER EXTREMITY EDEMA: ICD-10-CM

## 2023-01-26 DIAGNOSIS — J43.9 MIXED RESTRICTIVE AND OBSTRUCTIVE LUNG DISEASE (HCC): ICD-10-CM

## 2023-01-26 DIAGNOSIS — J96.11 CHRONIC RESPIRATORY FAILURE WITH HYPOXIA (HCC): ICD-10-CM

## 2023-01-26 DIAGNOSIS — E66.2 MORBID OBESITY WITH ALVEOLAR HYPOVENTILATION (HCC): ICD-10-CM

## 2023-01-26 DIAGNOSIS — I48.0 PAROXYSMAL ATRIAL FIBRILLATION (HCC): ICD-10-CM

## 2023-01-26 DIAGNOSIS — Z99.81 DEPENDENCE ON NOCTURNAL OXYGEN THERAPY: ICD-10-CM

## 2023-01-26 DIAGNOSIS — D68.69 SECONDARY HYPERCOAGULABLE STATE (HCC): ICD-10-CM

## 2023-01-26 DIAGNOSIS — J98.4 MIXED RESTRICTIVE AND OBSTRUCTIVE LUNG DISEASE (HCC): ICD-10-CM

## 2023-01-26 PROBLEM — J44.9 MIXED RESTRICTIVE AND OBSTRUCTIVE LUNG DISEASE (HCC): Status: ACTIVE | Noted: 2023-01-26

## 2023-01-26 PROCEDURE — 94761 N-INVAS EAR/PLS OXIMETRY MLT: CPT

## 2023-01-26 PROCEDURE — 99212 OFFICE O/P EST SF 10 MIN: CPT | Performed by: NURSE PRACTITIONER

## 2023-01-26 PROCEDURE — 99215 OFFICE O/P EST HI 40 MIN: CPT | Mod: 25

## 2023-01-26 PROCEDURE — 99213 OFFICE O/P EST LOW 20 MIN: CPT | Performed by: NURSE PRACTITIONER

## 2023-01-26 RX ORDER — ALBUTEROL SULFATE 90 UG/1
2 AEROSOL, METERED RESPIRATORY (INHALATION) EVERY 6 HOURS PRN
Qty: 18 G | Refills: 3 | Status: SHIPPED | OUTPATIENT
Start: 2023-01-26 | End: 2023-08-01

## 2023-01-26 RX ORDER — FUROSEMIDE 20 MG/1
20 TABLET ORAL DAILY
Qty: 90 TABLET | Refills: 0 | Status: SHIPPED | OUTPATIENT
Start: 2023-01-26

## 2023-01-26 RX ORDER — FLUTICASONE PROPIONATE AND SALMETEROL 100; 50 UG/1; UG/1
1 POWDER RESPIRATORY (INHALATION) EVERY 12 HOURS
Qty: 60 EACH | Refills: 3 | Status: SHIPPED | OUTPATIENT
Start: 2023-01-26 | End: 2023-05-11

## 2023-01-26 RX ORDER — POTASSIUM CHLORIDE 1.5 G/1.58G
20 POWDER, FOR SOLUTION ORAL DAILY
Qty: 90 EACH | Refills: 0 | Status: ON HOLD | OUTPATIENT
Start: 2023-01-26 | End: 2023-08-01

## 2023-01-26 RX ORDER — FAMOTIDINE 20 MG/1
20 TABLET, FILM COATED ORAL 2 TIMES DAILY
Qty: 180 TABLET | Refills: 0 | Status: SHIPPED | OUTPATIENT
Start: 2023-01-26 | End: 2023-04-10

## 2023-01-26 ASSESSMENT — FIBROSIS 4 INDEX: FIB4 SCORE: 0.75

## 2023-01-26 ASSESSMENT — ENCOUNTER SYMPTOMS
WHEEZING: 0
COUGH: 1
SHORTNESS OF BREATH: 1
NAUSEA: 0
DIAPHORESIS: 0
VOMITING: 0
CHILLS: 0
FALLS: 0
HEARTBURN: 1
WEAKNESS: 0
DIZZINESS: 0
HEADACHES: 0
SINUS PAIN: 0
DIARRHEA: 0
HEMOPTYSIS: 0
SPUTUM PRODUCTION: 1
FEVER: 0
MYALGIAS: 0
PALPITATIONS: 0

## 2023-01-26 NOTE — PATIENT INSTRUCTIONS
Please make sure that you are seen within 90 days of receiving your machine, with at least 30 days of use.  Please call scheduling at [phone] if your appointment needs to be moved to meet these parameters.     To meet compliance requirements for insurance please ensure that you use the machine at least 6/7 days a week for at least 4 or more hours a night.    Please bring your entire machine and chip to your first appointment, regardless if the machine is set up for wireless access.    Make sure you bring your chip to every follow-up appointment, regardless of wireless access.

## 2023-01-26 NOTE — PROCEDURES
Multi-Ox Readings  Multi Ox #1 Room air   O2 sat % at rest 96   O2 sat % on exertion 92   O2 sat average on exertion     Multi Ox #2     O2 sat % at rest     O2 sat % on exertion     O2 sat average on exertion       Oxygen Use 2   Oxygen Frequency With exertion and nocturnal   Duration of need     Is the patient mobile within the home?     CPAP Use?     BIPAP Use?     Servo Titration

## 2023-01-26 NOTE — ASSESSMENT & PLAN NOTE
Patient has lower extremity swelling, 2-3+.  Patient was previously on Lasix but does not have any refills.  She was due to see [facility] yesterday, but had be rescheduled until March.  Her last potassium was 4.5.  -- Lasix 20mg and Potassium chloride 20mEq once daily  -- Daily weights, monitoring for daily increases in weight (4+ pounds)

## 2023-01-26 NOTE — ASSESSMENT & PLAN NOTE
Patient states that her cough is worse at night when she lays down.  She states she will experience intermittent heartburn.  She was previously on Pepcid 20 mg twice daily, but she does not have any refills.  -- Pepcid 20 mg twice daily  -- Lifestyle modifications such as: keep head of the bed elevated at 30 degrees, avoid cafeinated drinks when possible even during the day, avoid eating anything 2 hours prior to going to bed

## 2023-01-26 NOTE — ASSESSMENT & PLAN NOTE
Patient intermittently wears 2 LPM of oxygen.  She wears 2 LPM of at night.  Multi ox today did not show the need for oxygen.   -- Continue to use 2 LPM of oxygen at night until AVAPS machine can be obtained

## 2023-01-26 NOTE — ASSESSMENT & PLAN NOTE
severe obstructive sleep apnea with significant hypoxia previously on AVAPS.  Previous order was sent back in August 2022 to [facility], but patient never received the machine.  -- [facility]

## 2023-01-26 NOTE — ASSESSMENT & PLAN NOTE
Referral sent to podiatry due to the [facility] nurse asking for a referral due to her nails needing to be cut.

## 2023-01-26 NOTE — PROGRESS NOTES
NEW DOAC   .  Anticoagulation Summary  As of 1/26/2023      INR goal:     TTR:  --   INR used for dosing:  No new INR was available at the time of this encounter.   Warfarin maintenance plan:  No maintenance plan   Next INR check:  2/2/2023   Target end date:  Indefinite    Indications    A-fib (HCC) [I48.91]  Secondary hypercoagulable state (HCC) [D68.69]                 Anticoagulation Episode Summary       INR check location:      Preferred lab:      Send INR reminders to:      Comments:            Anticoagulation Patient Findings  Patient Findings       Positives:  Change in health, Change in medications    Negatives:  Signs/symptoms of thrombosis, Signs/symptoms of bleeding, Laboratory test error suspected, Change in alcohol use, Change in activity, Upcoming invasive procedure, Emergency department visit, Upcoming dental procedure, Missed doses, Extra doses, Change in diet/appetite, Hospital admission, Bruising, Other complaints    Comments:  Pt recently noted to have AF on her monitor. Dr Borrego started her on Eliquis and metoprolol. She has not picked up Eliquis from the pharmacy yet.            PCP: Pcp Pt States None  Cardiologist: Dr Borrego  Dx: AF  CHADSVASC = 2 (chf, female)  HAS-BLED = 0  Target End Date = indefinite    Pt Hx: Pt has a hx of AF in the past. Was followed by our clinic for warfarin management. Anticoagulation dc'd last year but she was recently noted to have AF. Started on Eliquis 5 mg BID. She has not picked up yet. She is here with her caregiver. No current s/sx of CVA/TIA.    Last creatinine 0.86 1/7/23.      Labs:  Lab Results   Component Value Date/Time    WBC 9.5 01/07/2023 05:32 PM    RBC 4.54 01/07/2023 05:32 PM    HEMOGLOBIN 12.3 01/07/2023 05:32 PM    HEMATOCRIT 40.5 01/07/2023 05:32 PM    MCV 89.2 01/07/2023 05:32 PM    MCH 27.1 01/07/2023 05:32 PM    MCHC 30.4 (L) 01/07/2023 05:32 PM    MPV 11.6 01/07/2023 05:32 PM    NEUTSPOLYS 74.20 (H) 01/07/2023 05:32 PM     LYMPHOCYTES 17.50 (L) 01/07/2023 05:32 PM    MONOCYTES 6.30 01/07/2023 05:32 PM    EOSINOPHILS 1.50 01/07/2023 05:32 PM    BASOPHILS 0.20 01/07/2023 05:32 PM    HYPOCHROMIA 1+ 11/05/2022 10:41 PM    ANISOCYTOSIS 1+ 05/13/2022 05:36 PM      Lab Results   Component Value Date/Time    SODIUM 142 01/07/2023 05:32 PM    POTASSIUM 4.5 01/07/2023 05:32 PM    CHLORIDE 105 01/07/2023 05:32 PM    CO2 30 01/07/2023 05:32 PM    GLUCOSE 91 01/07/2023 05:32 PM    BUN 16 01/07/2023 05:32 PM    CREATININE 0.86 01/07/2023 05:32 PM    CREATININE 0.9 09/11/2008 01:45 PM    BUNCREATRAT 7.5 05/19/2022 02:18 AM          Pt is new to Eliquis and new to [facility]. Discussed:   Indication for DOAC therapy.  Importance of monitoring and compliance.   Monitoring parameters, signs and symptoms of bleeding or clotting.  DOAC therapy, side effects, potential DDIs, OTC medications  Hormonal therapy - to avoid  Pregnancy - n/a  DDI none  Pt is not on antiplatelet/NSAID therapy   Lifestyle safety, ie smoking, ETOH, hobby safety, fall safety/prevention  Procedures for missed doses or suspected missed doses, surgeries/procedures, travel, dental work, any medication changes    Began taking Eliquis 5 mg by mouth twice daily   prescription as soon as possible    DOAC affordable = should be covered 100% by medicaid    Samples provided: yes - 2 weeks of samples to last to her next appt.      F/U - 2 weeks    SHANKAR Zhou

## 2023-01-26 NOTE — ASSESSMENT & PLAN NOTE
PFTs in 2021 show an FVC of 1.45 L or 46%, FEV1 1.24 L or 48%, FEV1/FVC 86%, %, TLC 78%, and DLCO 85% predicted, with a positive bronchodilator response.  Patient recently quit smoking in 2022.  Patient has been intermittently using Advair.  She also use albuterol 1-2 times a week.  Restriction is most likely related to her BMI.  Symptomatically, she is short of breath with mMRC of 2-3, has a cough with occasional yellow sputum.  -- Continue Advair  -- Continue albuterol if needed

## 2023-02-01 ENCOUNTER — APPOINTMENT (OUTPATIENT)
Dept: SPEECH THERAPY | Facility: REHABILITATION | Age: 48
End: 2023-02-01
Attending: STUDENT IN AN ORGANIZED HEALTH CARE EDUCATION/TRAINING PROGRAM
Payer: MEDICAID

## 2023-02-09 ENCOUNTER — APPOINTMENT (OUTPATIENT)
Dept: VASCULAR LAB | Facility: MEDICAL CENTER | Age: 48
End: 2023-02-09
Payer: MEDICAID

## 2023-02-13 ENCOUNTER — ANTICOAGULATION VISIT (OUTPATIENT)
Dept: VASCULAR LAB | Facility: MEDICAL CENTER | Age: 48
End: 2023-02-13
Attending: INTERNAL MEDICINE
Payer: MEDICAID

## 2023-02-13 VITALS
BODY MASS INDEX: 50.83 KG/M2 | SYSTOLIC BLOOD PRESSURE: 141 MMHG | WEIGHT: 269 LBS | HEART RATE: 62 BPM | DIASTOLIC BLOOD PRESSURE: 66 MMHG

## 2023-02-13 DIAGNOSIS — I48.0 PAROXYSMAL ATRIAL FIBRILLATION (HCC): ICD-10-CM

## 2023-02-13 DIAGNOSIS — D68.69 SECONDARY HYPERCOAGULABLE STATE (HCC): ICD-10-CM

## 2023-02-13 PROCEDURE — 99212 OFFICE O/P EST SF 10 MIN: CPT | Performed by: NURSE PRACTITIONER

## 2023-02-13 ASSESSMENT — FIBROSIS 4 INDEX: FIB4 SCORE: 0.75

## 2023-02-13 NOTE — PROGRESS NOTES
Diagnosis: AF  Drug: Eliquis 5 mg BID  LOT: indefinite  CHADSVASC: 2 (chf, female)  HAS-BLED: 0    Health Status Since Last Assessment  Any new relevant medical problems, ED visits/hospitalizations - no  Any embolic events (stroke / TIA / systemic embolism) - no    Adherence with DOAC Therapy  no missed dose(s)  BLEEDING RISK ASSESSMENT:    Bleeding Risk Assessment  Severe epistaxis - no   Hemoptysis - no  Excessive or unusual bruising / hematomas - no  GIB/melena/BRBPR/hematemesis - no  Hematuria - no   Abnormal vaginal bleeding - no  Concerning daily headache or subdural hematoma symptoms - no  Decreasing hemoglobin or new anemia - no  Falls, presyncope, syncope, or seizures - no  Platelets: 227  Latest hemoglobin:     Lab Results   Component Value Date/Time    WBC 9.5 01/07/2023 05:32 PM    RBC 4.54 01/07/2023 05:32 PM    HEMOGLOBIN 12.3 01/07/2023 05:32 PM    HEMATOCRIT 40.5 01/07/2023 05:32 PM    MCV 89.2 01/07/2023 05:32 PM    MCH 27.1 01/07/2023 05:32 PM    MCHC 30.4 (L) 01/07/2023 05:32 PM    MPV 11.6 01/07/2023 05:32 PM    NEUTSPOLYS 74.20 (H) 01/07/2023 05:32 PM    LYMPHOCYTES 17.50 (L) 01/07/2023 05:32 PM    MONOCYTES 6.30 01/07/2023 05:32 PM    EOSINOPHILS 1.50 01/07/2023 05:32 PM    BASOPHILS 0.20 01/07/2023 05:32 PM    HYPOCHROMIA 1+ 11/05/2022 10:41 PM    ANISOCYTOSIS 1+ 05/13/2022 05:36 PM        HAS-BLED:  Hypertension (uncontrolled, >160 mmHg systolic) - no  Renal disease (dialysis, transplant, Cr >2.26 mg/dL or >200 µmol/L) - no  Liver disease (cirrhosis or bilirubin >2x normal with AST/ALT/AP >3x normal) - no  Stroke history - no  Prior major bleeding or predisposition to bleeding - no  Labile INR Unstable/high INRs, time in therapeutic range <60% - no  Age >65 - no  Medication usage predisposing to bleeding (aspirin, clopidogrel, NSAIDs) - no  Alcohol use  ?8 drinks/week - no      Creatinine Clearance/Renal Function  Latest eGFR / creatinine:  Lab Results   Component Value Date/Time    SODIUM  142 01/07/2023 05:32 PM    POTASSIUM 4.5 01/07/2023 05:32 PM    CHLORIDE 105 01/07/2023 05:32 PM    CO2 30 01/07/2023 05:32 PM    GLUCOSE 91 01/07/2023 05:32 PM    BUN 16 01/07/2023 05:32 PM    CREATININE 0.86 01/07/2023 05:32 PM    CREATININE 0.9 09/11/2008 01:45 PM    BUNCREATRAT 7.5 05/19/2022 02:18 AM       Is eGFR less than 50ml/min  no  Cr cl 0.86 ml/min    If YES, calculate CrCl (see back)  Any recent dehydrating illness or medications added/changed? i.e. Diuretics      Drug Interactions  ASA/antiplatelets - avoids  NSAID - avoids  Other drug interactions -  (Review med list / OTCs;)  Current Outpatient Medications on File Prior to Visit   Medication Sig Dispense Refill    apixaban (ELIQUIS) 5mg Tab Take 1 Tablet by mouth 2 times a day. 180 Tablet 3    furosemide (LASIX) 20 MG Tab Take 1 Tablet by mouth every day. 90 Tablet 0    potassium chloride (KLOR-CON) 20 MEQ Pack Take 1 Packet by mouth every day. 90 Each 0    albuterol 108 (90 Base) MCG/ACT Aero Soln inhalation aerosol Inhale 2 Puffs every 6 hours as needed for Shortness of Breath. 18 g 3    fluticasone-salmeterol (ADVAIR) 100-50 MCG/ACT AEROSOL POWDER, BREATH ACTIVATED Inhale 1 Puff every 12 hours. 60 Each 3    famotidine (PEPCID) 20 MG Tab Take 1 Tablet by mouth 2 times a day. 180 Tablet 0    metoprolol SR (TOPROL XL) 25 MG TABLET SR 24 HR Take 1 Tablet by mouth every day. 90 Tablet 3    aripiprazole (ABILIFY) 30 MG tablet Take 1 Tablet by mouth every morning. Indications: Major Depressive Disorder 30 Tablet 0    atorvastatin (LIPITOR) 20 MG Tab Take 1 Tablet by mouth at bedtime. 30 Tablet 0    montelukast (SINGULAIR) 10 MG Tab Take 1 Tablet by mouth every evening. 30 Tablet 0    benzonatate (TESSALON) 100 MG Cap Take 1 Capsule by mouth 3 times a day as needed for Cough. (Patient not taking: Reported on 12/6/2022) 60 Capsule 0    sennosides (SENOKOT) 8.6 MG Tab Take 1 Tablet by mouth 1 time a day as needed (constipation). 30 Tablet 0    traZODone  (DESYREL) 50 MG Tab Take 1 Tablet by mouth at bedtime. 30 Tablet 3     No current facility-administered medications on file prior to visit.     Verified no anticonvulsant or azole therapy, education provided for future use.      Examination  Blood pressure:  141/66   Elevated BP - no (sBP greater than 160 mmHg)   Symptomatic hypotension - no  Significant gait impairment / imbalance / high risk for falls - no obvious risks    Final Assessment and Recommendations:  Patient appears stable from the anticoagulation standpoint  Benefits of continued DOAC therapy outweigh risks for this patient  Recommend continue current DOAC at same dose  Based on age, weight and cr she qualifies for Eliquis 5 mg BID.  CBC, BMP prior to next visit.    - Continue taking Eliquis 5 mg by mouth twice daily    Other Actions:   The rationale for continued DOAC therapy  The potential for minor, major or life-threatening bleeding  Dosing instructions, adherence, risks of non-adherence, handling missed doses  Avoiding OTC ASA & NSAIDs & minimizing EtOH to reduce bleeding risks  Dosing around upcoming procedure / surgery if applicable (see back)    Follow up:  Will follow up with patient in 6 months.      Sharri SHRESTHA

## 2023-02-22 ENCOUNTER — NON-PROVIDER VISIT (OUTPATIENT)
Dept: CARDIOLOGY | Facility: MEDICAL CENTER | Age: 48
End: 2023-02-22
Payer: MEDICAID

## 2023-02-22 PROCEDURE — 93298 REM INTERROG DEV EVAL SCRMS: CPT | Performed by: INTERNAL MEDICINE

## 2023-02-23 NOTE — CARDIAC REMOTE MONITOR - SCAN
Device transmission reviewed. Device demonstrated appropriate function.       Electronically Signed by: Rodney Alberto M.D.    3/3/2023  12:55 PM

## 2023-03-17 ENCOUNTER — APPOINTMENT (OUTPATIENT)
Dept: CARDIOLOGY | Facility: MEDICAL CENTER | Age: 48
End: 2023-03-17
Payer: MEDICAID

## 2023-03-25 ENCOUNTER — NON-PROVIDER VISIT (OUTPATIENT)
Dept: CARDIOLOGY | Facility: MEDICAL CENTER | Age: 48
End: 2023-03-25
Payer: MEDICAID

## 2023-03-25 PROCEDURE — 93298 REM INTERROG DEV EVAL SCRMS: CPT | Performed by: INTERNAL MEDICINE

## 2023-03-27 NOTE — CARDIAC REMOTE MONITOR - SCAN
Device transmission reviewed. Device demonstrated appropriate function. Occasional undersensing      Electronically Signed by: Rodney Alberto M.D.    3/28/2023  12:50 PM

## 2023-04-17 ENCOUNTER — OFFICE VISIT (OUTPATIENT)
Dept: CARDIOLOGY | Facility: MEDICAL CENTER | Age: 48
End: 2023-04-17
Attending: INTERNAL MEDICINE
Payer: MEDICAID

## 2023-04-17 VITALS
OXYGEN SATURATION: 94 % | WEIGHT: 262 LBS | SYSTOLIC BLOOD PRESSURE: 110 MMHG | HEIGHT: 61 IN | RESPIRATION RATE: 16 BRPM | HEART RATE: 54 BPM | BODY MASS INDEX: 49.47 KG/M2 | DIASTOLIC BLOOD PRESSURE: 72 MMHG

## 2023-04-17 DIAGNOSIS — R60.0 LOWER EXTREMITY EDEMA: ICD-10-CM

## 2023-04-17 DIAGNOSIS — F17.201 TOBACCO ABUSE, IN REMISSION: ICD-10-CM

## 2023-04-17 DIAGNOSIS — I50.32 CHRONIC DIASTOLIC HEART FAILURE (HCC): ICD-10-CM

## 2023-04-17 DIAGNOSIS — I48.0 PAROXYSMAL ATRIAL FIBRILLATION (HCC): ICD-10-CM

## 2023-04-17 PROBLEM — F17.200 NICOTINE DEPENDENCE: Status: RESOLVED | Noted: 2021-06-17 | Resolved: 2023-04-17

## 2023-04-17 PROCEDURE — 99214 OFFICE O/P EST MOD 30 MIN: CPT | Performed by: INTERNAL MEDICINE

## 2023-04-17 RX ORDER — METOPROLOL SUCCINATE 25 MG/1
12.5 TABLET, EXTENDED RELEASE ORAL DAILY
Qty: 50 TABLET | Refills: 3 | Status: SHIPPED | OUTPATIENT
Start: 2023-04-17 | End: 2023-08-01

## 2023-04-17 ASSESSMENT — ENCOUNTER SYMPTOMS
NAUSEA: 0
DYSPNEA ON EXERTION: 0
PND: 0
ABDOMINAL PAIN: 0
DIZZINESS: 0
ORTHOPNEA: 0
PALPITATIONS: 0
BLURRED VISION: 0
DEPRESSION: 0
DIARRHEA: 0
ALTERED MENTAL STATUS: 0
FLANK PAIN: 0
FEVER: 0
BACK PAIN: 0
WEIGHT GAIN: 0
DECREASED APPETITE: 0
WEIGHT LOSS: 0
COUGH: 0
SYNCOPE: 0
CONSTIPATION: 0
IRREGULAR HEARTBEAT: 0
VOMITING: 0
SHORTNESS OF BREATH: 0
HEARTBURN: 0
CLAUDICATION: 0
NEAR-SYNCOPE: 0

## 2023-04-17 ASSESSMENT — FIBROSIS 4 INDEX: FIB4 SCORE: 0.75

## 2023-04-17 NOTE — PROGRESS NOTES
Cardiology Note    Chief Complaint   Patient presents with    Atrial Fibrillation     F/V Dx: Paroxysmal atrial fibrillation (HCC)       History of Present Illness: Lorene Haro is a 47 y.o. female PMH KATHIA on cpap, restrictive/obstructive stalin disease, morbid obesity, former smoker, paroxysmal AF, diastolic heart failure who presents for follow up visit.    Doing well. Lives in group home now. Started working. Enjoys her job. Quit smoking and tobacco free 3 months. Compliant with medications and denies adverse effects. Although not on metoprolol. Had chest pressure/palpitations about twice monthly which is unchanged from prior. Overall doing better.    Review of Systems   Constitutional: Negative for decreased appetite, fever, malaise/fatigue, weight gain and weight loss.   HENT:  Negative for congestion and nosebleeds.    Eyes:  Negative for blurred vision.   Cardiovascular:  Negative for chest pain, claudication, dyspnea on exertion, irregular heartbeat, leg swelling, near-syncope, orthopnea, palpitations, paroxysmal nocturnal dyspnea and syncope.   Respiratory:  Negative for cough and shortness of breath.    Endocrine: Negative for cold intolerance and heat intolerance.   Skin:  Negative for rash.   Musculoskeletal:  Negative for back pain.   Gastrointestinal:  Negative for abdominal pain, constipation, diarrhea, heartburn, melena, nausea and vomiting.   Genitourinary:  Negative for dysuria, flank pain and hematuria.   Neurological:  Negative for dizziness.   Psychiatric/Behavioral:  Negative for altered mental status and depression.        Past Medical History:   Diagnosis Date    A-fib (HCC)     Asthma     Bipolar 2 disorder (HCC)     Chickenpox     Hypertension     On home oxygen therapy     Other psychological stress     Schizophrenic disorder (HCC)     Yeast infection of the skin 04/01/2021         Past Surgical History:   Procedure Laterality Date    CHOLECYSTECTOMY      COLECTOMY      HYSTERECTOMY  LAPAROSCOPY      KNEE ARTHROSCOPY Left          Current Outpatient Medications   Medication Sig Dispense Refill    metoprolol SR (TOPROL XL) 25 MG TABLET SR 24 HR Take 0.5 Tablets by mouth every day. 50 Tablet 3    famotidine (PEPCID) 20 MG Tab TAKE 1 TABLET BY MOUTH TWICE DAILY. 60 Tablet 0    apixaban (ELIQUIS) 5mg Tab Take 1 Tablet by mouth 2 times a day. 180 Tablet 3    furosemide (LASIX) 20 MG Tab Take 1 Tablet by mouth every day. 90 Tablet 0    potassium chloride (KLOR-CON) 20 MEQ Pack Take 1 Packet by mouth every day. 90 Each 0    albuterol 108 (90 Base) MCG/ACT Aero Soln inhalation aerosol Inhale 2 Puffs every 6 hours as needed for Shortness of Breath. 18 g 3    fluticasone-salmeterol (ADVAIR) 100-50 MCG/ACT AEROSOL POWDER, BREATH ACTIVATED Inhale 1 Puff every 12 hours. 60 Each 3    aripiprazole (ABILIFY) 30 MG tablet Take 1 Tablet by mouth every morning. Indications: Major Depressive Disorder 30 Tablet 0    atorvastatin (LIPITOR) 20 MG Tab Take 1 Tablet by mouth at bedtime. 30 Tablet 0    montelukast (SINGULAIR) 10 MG Tab Take 1 Tablet by mouth every evening. 30 Tablet 0    sennosides (SENOKOT) 8.6 MG Tab Take 1 Tablet by mouth 1 time a day as needed (constipation). 30 Tablet 0    traZODone (DESYREL) 50 MG Tab Take 1 Tablet by mouth at bedtime. 30 Tablet 3     No current facility-administered medications for this visit.         Allergies   Allergen Reactions    Penicillin G Rash and Itching     pt reports that she gets a rash all over her body and gets itchy    Amoxicillin Rash and Itching     Tolerates cephalosporins, pt reports that she gets a rash all over her body and gets itchy         Family History   Problem Relation Age of Onset    No Known Problems Mother     Cancer Sister          Social History     Socioeconomic History    Marital status: Single     Spouse name: Not on file    Number of children: Not on file    Years of education: Not on file    Highest education level: Not on file   Occupational  "History    Not on file   Tobacco Use    Smoking status: Former     Packs/day: 0.25     Types: Cigarettes     Quit date: 10/12/2022     Years since quittin.5    Smokeless tobacco: Never   Vaping Use    Vaping Use: Never used   Substance and Sexual Activity    Alcohol use: Never    Drug use: Never    Sexual activity: Not Currently   Other Topics Concern    Not on file   Social History Narrative    ** Merged History Encounter **         From [facility] Determinants of Health     Financial Resource Strain: Medium Risk    Difficulty of Paying Living Expenses: Somewhat hard   Food Insecurity: Food Insecurity Present    Worried About Running Out of Food in the Last Year: Often true    Ran Out of Food in the Last Year: Often true   Transportation Needs: No Transportation Needs    Lack of Transportation (Medical): No    Lack of Transportation (Non-Medical): No   Physical Activity: Not on file   Stress: Not on file   Social Connections: Not on file   Intimate Partner Violence: Not on file   Housing Stability: Not on file         Physical Exam:  Ambulatory Vitals  /72 (BP Location: Left arm, Patient Position: Sitting, BP Cuff Size: Adult)   Pulse (!) 54   Resp 16   Ht 1.549 m (5' 1\")   Wt 119 kg (262 lb)   SpO2 94%    BP Readings from Last 4 Encounters:   23 110/72   23 (!) 141/66   23 122/76   23 131/52     Weight/BMI:   Vitals:    23 1445   BP: 110/72   Weight: 119 kg (262 lb)   Height: 1.549 m (5' 1\")    Body mass index is 49.5 kg/m².  Wt Readings from Last 4 Encounters:   23 119 kg (262 lb)   23 122 kg (269 lb)   23 122 kg (270 lb)   23 125 kg (275 lb)       Physical Exam  Constitutional:       General: She is not in acute distress.  HENT:      Head: Normocephalic and atraumatic.   Eyes:      Conjunctiva/sclera: Conjunctivae normal.      Pupils: Pupils are equal, round, and reactive to light.   Neck:      Vascular: No JVD.   Cardiovascular:      " Rate and Rhythm: Normal rate and regular rhythm.      Heart sounds: Normal heart sounds. No murmur heard.    No friction rub. No gallop.   Pulmonary:      Effort: Pulmonary effort is normal. No respiratory distress.      Breath sounds: Normal breath sounds. No wheezing or rales.   Chest:      Chest wall: No tenderness.   Abdominal:      General: Bowel sounds are normal. There is no distension.      Palpations: Abdomen is soft.   Musculoskeletal:      Cervical back: Normal range of motion and neck supple.   Skin:     General: Skin is warm and dry.   Neurological:      Mental Status: She is alert and oriented to person, place, and time.   Psychiatric:         Mood and Affect: Affect normal.         Judgment: Judgment normal.       Lab Data Review:  Lab Results   Component Value Date/Time    CHOLSTRLTOT 198 05/10/2021 05:42 AM     (H) 05/10/2021 05:42 AM    HDL 48 05/10/2021 05:42 AM    TRIGLYCERIDE 126 05/10/2021 05:42 AM       Lab Results   Component Value Date/Time    SODIUM 142 01/07/2023 05:32 PM    POTASSIUM 4.5 01/07/2023 05:32 PM    CHLORIDE 105 01/07/2023 05:32 PM    CO2 30 01/07/2023 05:32 PM    GLUCOSE 91 01/07/2023 05:32 PM    BUN 16 01/07/2023 05:32 PM    CREATININE 0.86 01/07/2023 05:32 PM    CREATININE 0.9 09/11/2008 01:45 PM    BUNCREATRAT 7.5 05/19/2022 02:18 AM     CrCl cannot be calculated (Patient's most recent lab result is older than the maximum 7 days allowed.).  Lab Results   Component Value Date/Time    ALKPHOSPHAT 114 (H) 01/07/2023 05:32 PM    ASTSGOT 13 01/07/2023 05:32 PM    ALTSGPT 14 01/07/2023 05:32 PM    TBILIRUBIN 0.9 01/07/2023 05:32 PM      Lab Results   Component Value Date/Time    WBC 9.5 01/07/2023 05:32 PM     Lab Results   Component Value Date/Time    HBA1C 5.7 (H) 09/02/2022 06:05 PM     No components found for: TROP      Cardiac Imaging and Procedures Review:      TTE 11/18/2019  CONCLUSIONS  Compared to the images of the study done on 11/14/2018 - there has been   no  significant change.   Normal left ventricular systolic function.  Left ventricular ejection fraction is visually estimated to be 60%.  Normal diastolic function.  Normal right ventricular systolic function.  Mild mitral regurgitation.  Moderate tricuspid regurgitation.  Right ventricular systolic pressure is estimated to be 65 mmHg.    TTE 11/14/2018  CONCLUSIONS  No prior study is available for comparison.   Technically difficult study - adequate information is obtained.   Grossly normal left ventricular systolic function.  Left ventricular ejection fraction is visually estimated to be 65%.  Probable mild aortic stenosis.  Dilated inferior vena cava without   inspiratory collapse.  Estimated right ventricular systolic pressure is   60 mmHg.    PFTs 9/2021  Interpretation;   Baseline spirometry shows significant reduction in FVC at 1.45 L or 46% predicted and FEV1 at 1.24 L or 48% predicted with FEV1/FVC ratio of 86 suggesting restriction.  There is significant bronchodilator response with postbronchodilator FEV1 of 1.48 L or 58% predicted.  Total lung capacity is just below lower limits of normal at 3.61 L or 78% predicted.  Diffusion capacity is within normal limits at 85% predicted.  Pulmonary function testing shows mixed restrictive obstructive defect with significant bronchodilator response.  This could be consistent with COPD in a patient with morbid obesity.  Correlate clinically and with imaging    MPI spect stress 2/2022  NUCLEAR IMAGING INTERPRETATION   No evidence of significant jeopardized viable myocardium or prior myocardial    infarction.   Normal left ventricular size, ejection fraction, and wall motion.   ECG INTERPRETATION   Negative stress ECG for ischemia.    Event monitor 5/2022  Procedure: biotel monitor; 5d 23h; 4/13/22   Indication: ?paroxysmal AF   Quality: good   Findings:   Underlying rhythm: Predominantly sinus rhythm with average rate 73 bpm. No atrial fibrillation nor flutter.   Atrial  events: Rare ectopy.   Ventricular events: Rare ectopy.   Patient events: No patient triggered events.   Impressions:   No atrial fibrillation nor flutter detected.     TTE 10/2022  CONCLUSIONS  Normal left ventricular size, wall thickness, and systolic function.  Prior echocardiogram 2/27/2022, now without evidence of intracavitary   gradient.    Medical Decision Making:  Problem List Items Addressed This Visit       A-fib (HCC)    Relevant Medications    metoprolol SR (TOPROL XL) 25 MG TABLET SR 24 HR    Lower extremity edema    Tobacco abuse, in remission    Chronic diastolic heart failure (HCC)    Relevant Medications    metoprolol SR (TOPROL XL) 25 MG TABLET SR 24 HR     Atrial fibrillation, paroxysmal - recurrence on loop recorder 1/11/23 5077. Continue doac for cva prevention; chadsvasc 2. Resume low dose rate control.    Diastolic HF - NYHA I - symptoms controlled. Continue lasix / potassium.     Tobacco use in remission. Congratulated. Encouraged to continue cessation.     It was my pleasure to meet with Ms. Haro.

## 2023-04-18 NOTE — ED NOTES
Pt medicated according to MAR. Denies any further needs at this time. Call light within reach.    Xeljanz Counseling: I discussed with the patient the risks of Xeljanz therapy including increased risk of infection, liver issues, headache, diarrhea, or cold symptoms. Live vaccines should be avoided. They were instructed to call if they have any problems.

## 2023-04-25 ENCOUNTER — NON-PROVIDER VISIT (OUTPATIENT)
Dept: CARDIOLOGY | Facility: MEDICAL CENTER | Age: 48
End: 2023-04-25
Payer: MEDICAID

## 2023-04-25 PROCEDURE — 93298 REM INTERROG DEV EVAL SCRMS: CPT | Performed by: INTERNAL MEDICINE

## 2023-04-26 NOTE — CARDIAC REMOTE MONITOR - SCAN
Device transmission reviewed. Device demonstrated appropriate function.       Electronically Signed by: Nader Barraza M.D.    4/26/2023  8:07 AM

## 2023-04-27 ENCOUNTER — HOSPITAL ENCOUNTER (EMERGENCY)
Facility: MEDICAL CENTER | Age: 48
End: 2023-04-27
Attending: EMERGENCY MEDICINE
Payer: MEDICAID

## 2023-04-27 ENCOUNTER — APPOINTMENT (OUTPATIENT)
Dept: RADIOLOGY | Facility: MEDICAL CENTER | Age: 48
End: 2023-04-27
Attending: EMERGENCY MEDICINE
Payer: MEDICAID

## 2023-04-27 VITALS
BODY MASS INDEX: 49.24 KG/M2 | DIASTOLIC BLOOD PRESSURE: 55 MMHG | SYSTOLIC BLOOD PRESSURE: 111 MMHG | TEMPERATURE: 97.6 F | HEIGHT: 61 IN | WEIGHT: 260.8 LBS | HEART RATE: 59 BPM | OXYGEN SATURATION: 94 % | RESPIRATION RATE: 16 BRPM

## 2023-04-27 DIAGNOSIS — R07.9 CHEST PAIN, UNSPECIFIED TYPE: ICD-10-CM

## 2023-04-27 LAB
ALBUMIN SERPL BCP-MCNC: 4.3 G/DL (ref 3.2–4.9)
ALBUMIN/GLOB SERPL: 1.2 G/DL
ALP SERPL-CCNC: 153 U/L (ref 30–99)
ALT SERPL-CCNC: 16 U/L (ref 2–50)
ANION GAP SERPL CALC-SCNC: 11 MMOL/L (ref 7–16)
AST SERPL-CCNC: 14 U/L (ref 12–45)
BASOPHILS # BLD AUTO: 0.3 % (ref 0–1.8)
BASOPHILS # BLD: 0.03 K/UL (ref 0–0.12)
BILIRUB SERPL-MCNC: 0.8 MG/DL (ref 0.1–1.5)
BUN SERPL-MCNC: 18 MG/DL (ref 8–22)
CALCIUM ALBUM COR SERPL-MCNC: 9.3 MG/DL (ref 8.5–10.5)
CALCIUM SERPL-MCNC: 9.5 MG/DL (ref 8.5–10.5)
CHLORIDE SERPL-SCNC: 102 MMOL/L (ref 96–112)
CO2 SERPL-SCNC: 26 MMOL/L (ref 20–33)
CREAT SERPL-MCNC: 0.98 MG/DL (ref 0.5–1.4)
D DIMER PPP IA.FEU-MCNC: <0.27 UG/ML (FEU) (ref 0–0.5)
EKG IMPRESSION: NORMAL
EOSINOPHIL # BLD AUTO: 0.1 K/UL (ref 0–0.51)
EOSINOPHIL NFR BLD: 0.9 % (ref 0–6.9)
ERYTHROCYTE [DISTWIDTH] IN BLOOD BY AUTOMATED COUNT: 50.7 FL (ref 35.9–50)
GFR SERPLBLD CREATININE-BSD FMLA CKD-EPI: 70 ML/MIN/1.73 M 2
GLOBULIN SER CALC-MCNC: 3.5 G/DL (ref 1.9–3.5)
GLUCOSE SERPL-MCNC: 86 MG/DL (ref 65–99)
HCT VFR BLD AUTO: 41.5 % (ref 37–47)
HGB BLD-MCNC: 12.8 G/DL (ref 12–16)
IMM GRANULOCYTES # BLD AUTO: 0.06 K/UL (ref 0–0.11)
IMM GRANULOCYTES NFR BLD AUTO: 0.5 % (ref 0–0.9)
LYMPHOCYTES # BLD AUTO: 1.91 K/UL (ref 1–4.8)
LYMPHOCYTES NFR BLD: 16.3 % (ref 22–41)
MCH RBC QN AUTO: 27.1 PG (ref 27–33)
MCHC RBC AUTO-ENTMCNC: 30.8 G/DL (ref 33.6–35)
MCV RBC AUTO: 87.9 FL (ref 81.4–97.8)
MONOCYTES # BLD AUTO: 0.64 K/UL (ref 0–0.85)
MONOCYTES NFR BLD AUTO: 5.5 % (ref 0–13.4)
NEUTROPHILS # BLD AUTO: 8.98 K/UL (ref 2–7.15)
NEUTROPHILS NFR BLD: 76.5 % (ref 44–72)
NRBC # BLD AUTO: 0 K/UL
NRBC BLD-RTO: 0 /100 WBC
PLATELET # BLD AUTO: 286 K/UL (ref 164–446)
PMV BLD AUTO: 11.1 FL (ref 9–12.9)
POTASSIUM SERPL-SCNC: 4.2 MMOL/L (ref 3.6–5.5)
PROT SERPL-MCNC: 7.8 G/DL (ref 6–8.2)
RBC # BLD AUTO: 4.72 M/UL (ref 4.2–5.4)
SODIUM SERPL-SCNC: 139 MMOL/L (ref 135–145)
TROPONIN T SERPL-MCNC: 24 NG/L (ref 6–19)
TROPONIN T SERPL-MCNC: 27 NG/L (ref 6–19)
WBC # BLD AUTO: 11.7 K/UL (ref 4.8–10.8)

## 2023-04-27 PROCEDURE — 96374 THER/PROPH/DIAG INJ IV PUSH: CPT

## 2023-04-27 PROCEDURE — 99285 EMERGENCY DEPT VISIT HI MDM: CPT

## 2023-04-27 PROCEDURE — 85379 FIBRIN DEGRADATION QUANT: CPT

## 2023-04-27 PROCEDURE — 36415 COLL VENOUS BLD VENIPUNCTURE: CPT

## 2023-04-27 PROCEDURE — 84484 ASSAY OF TROPONIN QUANT: CPT

## 2023-04-27 PROCEDURE — 700111 HCHG RX REV CODE 636 W/ 250 OVERRIDE (IP): Mod: UD | Performed by: EMERGENCY MEDICINE

## 2023-04-27 PROCEDURE — 93005 ELECTROCARDIOGRAM TRACING: CPT | Performed by: EMERGENCY MEDICINE

## 2023-04-27 PROCEDURE — 85025 COMPLETE CBC W/AUTO DIFF WBC: CPT

## 2023-04-27 PROCEDURE — 71045 X-RAY EXAM CHEST 1 VIEW: CPT

## 2023-04-27 PROCEDURE — 80053 COMPREHEN METABOLIC PANEL: CPT

## 2023-04-27 PROCEDURE — 93005 ELECTROCARDIOGRAM TRACING: CPT

## 2023-04-27 RX ORDER — KETOROLAC TROMETHAMINE 30 MG/ML
15 INJECTION, SOLUTION INTRAMUSCULAR; INTRAVENOUS ONCE
Status: COMPLETED | OUTPATIENT
Start: 2023-04-27 | End: 2023-04-27

## 2023-04-27 RX ADMIN — KETOROLAC TROMETHAMINE 15 MG: 30 INJECTION, SOLUTION INTRAMUSCULAR; INTRAVENOUS at 17:39

## 2023-04-27 ASSESSMENT — LIFESTYLE VARIABLES: DO YOU DRINK ALCOHOL: NO

## 2023-04-27 ASSESSMENT — FIBROSIS 4 INDEX: FIB4 SCORE: 0.75

## 2023-04-27 NOTE — ED PROVIDER NOTES
ED Provider Note    CHIEF COMPLAINT  Chief Complaint   Patient presents with    Chest Pain     Sudden onset at 0600 today  Woke from sleep  Midsternal radiating through to back  Describes pain as 10/10 sharp  Denies N/V       EXTERNAL RECORDS REVIEWED  Other 10/1/2022 echocardiogram of the heart was negative.    HPI/ROS      Lorenesamina Haro is a 47 y.o. female who presents presenting with chest pain.  She states she woke from sleep approximately at 6 AM and having chest pain.  Pain is midsternal with slight radiation to her back but more in the center of his chest.  Sharp in nature.  She states she is had this before but not this severe.  She has fever, shakes, chills, sweats, nausea, vomiting, abdominal pain, shortness of breath.    PAST MEDICAL HISTORY   has a past medical history of A-fib (HCC), Asthma, Bipolar 2 disorder (HCC), Chickenpox, Hypertension, On home oxygen therapy, Other psychological stress, Schizophrenic disorder (HCC), and Yeast infection of the skin (2021).    SURGICAL HISTORY   has a past surgical history that includes hysterectomy laparoscopy; colectomy; cholecystectomy; and knee arthroscopy (Left).    FAMILY HISTORY  Family History   Problem Relation Age of Onset    No Known Problems Mother     Cancer Sister        SOCIAL HISTORY  Social History     Tobacco Use    Smoking status: Former     Packs/day: 0.25     Types: Cigarettes     Quit date: 10/12/2022     Years since quittin.5    Smokeless tobacco: Never   Vaping Use    Vaping Use: Never used   Substance and Sexual Activity    Alcohol use: Never    Drug use: Never    Sexual activity: Not Currently       CURRENT MEDICATIONS  Home Medications       Reviewed by Nohemy Tyler R.N. (Registered Nurse) on 23 at 1437  Med List Status: Partial     Medication Last Dose Status   albuterol 108 (90 Base) MCG/ACT Aero Soln inhalation aerosol  Active   apixaban (ELIQUIS) 5mg Tab  Active   aripiprazole (ABILIFY) 30 MG tablet  Active  "  atorvastatin (LIPITOR) 20 MG Tab  Active   famotidine (PEPCID) 20 MG Tab  Active   fluticasone-salmeterol (ADVAIR) 100-50 MCG/ACT AEROSOL POWDER, BREATH ACTIVATED  Active   furosemide (LASIX) 20 MG Tab  Active   metoprolol SR (TOPROL XL) 25 MG TABLET SR 24 HR  Active   montelukast (SINGULAIR) 10 MG Tab  Active   potassium chloride (KLOR-CON) 20 MEQ Pack  Active   sennosides (SENOKOT) 8.6 MG Tab  Active   traZODone (DESYREL) 50 MG Tab  Active                    ALLERGIES  Allergies   Allergen Reactions    Penicillin G Rash and Itching     pt reports that she gets a rash all over her body and gets itchy    Amoxicillin Rash and Itching     Tolerates cephalosporins, pt reports that she gets a rash all over her body and gets itchy       PHYSICAL EXAM  VITAL SIGNS: /55   Pulse (!) 59   Temp 36.4 °C (97.6 °F) (Temporal)   Resp 16   Ht 1.549 m (5' 1\")   Wt 118 kg (260 lb 12.9 oz)   LMP  (LMP Unknown)   SpO2 94%   BMI 49.28 kg/m²      Nursing notes and vitals reviewed.  Constitutional: Well developed, Well nourished, No acute distress, Non-toxic appearance.   Eyes: PERRLA, EOMI, Conjunctiva normal, No discharge.   Cardiovascular: Normal heart rate, Normal rhythm, No murmurs, No rubs, No gallops.   Thorax & Lungs: No respiratory distress, No rales, No rhonchi, No wheezing, No chest tenderness.   Abdomen: Bowel sounds normal, Soft, No tenderness, No guarding, No rebound, No masses, No pulsatile masses.   Skin: Warm, Dry, No erythema, No rash.   Extremities: No deformity, no pedal edema, good range of motion range of motion upper lower extremes bilaterally  Neurologic: Alert & oriented x 3, no focal abnormalities noted, acting appropriately on examination  Psychiatric: Affect normal for clinical presentation.      DIAGNOSTIC STUDIES / PROCEDURES  EKG  I have independently interpreted this EKG  Sinus rhythm on monitor  LABS  Results for orders placed or performed during the hospital encounter of 04/27/23   CBC " with Differential   Result Value Ref Range    WBC 11.7 (H) 4.8 - 10.8 K/uL    RBC 4.72 4.20 - 5.40 M/uL    Hemoglobin 12.8 12.0 - 16.0 g/dL    Hematocrit 41.5 37.0 - 47.0 %    MCV 87.9 81.4 - 97.8 fL    MCH 27.1 27.0 - 33.0 pg    MCHC 30.8 (L) 33.6 - 35.0 g/dL    RDW 50.7 (H) 35.9 - 50.0 fL    Platelet Count 286 164 - 446 K/uL    MPV 11.1 9.0 - 12.9 fL    Neutrophils-Polys 76.50 (H) 44.00 - 72.00 %    Lymphocytes 16.30 (L) 22.00 - 41.00 %    Monocytes 5.50 0.00 - 13.40 %    Eosinophils 0.90 0.00 - 6.90 %    Basophils 0.30 0.00 - 1.80 %    Immature Granulocytes 0.50 0.00 - 0.90 %    Nucleated RBC 0.00 /100 WBC    Neutrophils (Absolute) 8.98 (H) 2.00 - 7.15 K/uL    Lymphs (Absolute) 1.91 1.00 - 4.80 K/uL    Monos (Absolute) 0.64 0.00 - 0.85 K/uL    Eos (Absolute) 0.10 0.00 - 0.51 K/uL    Baso (Absolute) 0.03 0.00 - 0.12 K/uL    Immature Granulocytes (abs) 0.06 0.00 - 0.11 K/uL    NRBC (Absolute) 0.00 K/uL   Complete Metabolic Panel (CMP)   Result Value Ref Range    Sodium 139 135 - 145 mmol/L    Potassium 4.2 3.6 - 5.5 mmol/L    Chloride 102 96 - 112 mmol/L    Co2 26 20 - 33 mmol/L    Anion Gap 11.0 7.0 - 16.0    Glucose 86 65 - 99 mg/dL    Bun 18 8 - 22 mg/dL    Creatinine 0.98 0.50 - 1.40 mg/dL    Calcium 9.5 8.5 - 10.5 mg/dL    AST(SGOT) 14 12 - 45 U/L    ALT(SGPT) 16 2 - 50 U/L    Alkaline Phosphatase 153 (H) 30 - 99 U/L    Total Bilirubin 0.8 0.1 - 1.5 mg/dL    Albumin 4.3 3.2 - 4.9 g/dL    Total Protein 7.8 6.0 - 8.2 g/dL    Globulin 3.5 1.9 - 3.5 g/dL    A-G Ratio 1.2 g/dL   Troponins NOW   Result Value Ref Range    Troponin T 27 (H) 6 - 19 ng/L   Troponins in two (2) hours   Result Value Ref Range    Troponin T 24 (H) 6 - 19 ng/L   CORRECTED CALCIUM   Result Value Ref Range    Correct Calcium 9.3 8.5 - 10.5 mg/dL   ESTIMATED GFR   Result Value Ref Range    GFR (CKD-EPI) 70 >60 mL/min/1.73 m 2   D-DIMER   Result Value Ref Range    D-Dimer <0.27 0.00 - 0.50 ug/mL (FEU)   EKG   Result Value Ref Range    Report        [facility] Emergency Dept.    Test Date:  2023  Pt Name:    ADELINA MILLAN                Department: ER  MRN:        [id number]                      Room:  Gender:     Female                       Technician: [id number]  :        [id number]                   Requested By:ER TRIAGE PROTOCOL  Order #:    [id number]                    Reading MD:    Measurements  Intervals                                Axis  Rate:       62                           P:          63  KY:         140                          QRS:        63  QRSD:       110                          T:          8  QT:         407  QTc:        414    Interpretive Statements  Sinus rhythm  Atrial premature complex  Compared to ECG 2023 12:25:35  Atrial premature complex(es) now present  Sinus bradycardia no longer present  T-wave abnormality no longer present     I agree with the above findings on EKG with a normal sinus rhythm, heart rate of 62, normal QRS is 110, QTc is normal 414, no ST elevation myocardial infarction, slight atrial premature complexes.  No significant change compared EKG completed 2023      RADIOLOGY  I have independently interpreted the diagnostic imaging associated with this visit and am waiting the final reading from the radiologist.   My preliminary interpretation is as follows: X-rays negative for infiltrate  Radiologist interpretation:   DX-CHEST-PORTABLE (1 VIEW)   Final Result      No acute cardiac or pulmonary abnormalities are identified.            COURSE & MEDICAL DECISION MAKING    ED Observation Status? Yes; I am placing the patient in to an observation status due to a diagnostic uncertainty as well as therapeutic intensity. Patient placed in observation status at 4:29 PM, 2023.     Observation plan is as follows: Delta troponin, EKG, chest x-ray reevaluation    Upon Reevaluation, the patient's condition has: Improved; and will be discharged.    Patient discharged from ED Observation  status at 1850 (Time) 4/27/23 (Date).     INITIAL ASSESSMENT, COURSE AND PLAN  Care Narrative: This is a pleasant 27-year-old female presents with chest pain.  Here in the emergency department she has a heart score of 3, the patient has no evidence of ST elevation myocardial infarction.  She has had a slightly elevated troponin at 23 and delta troponin of 24.  On review of her labs she is consistently had elevated troponins and negative cardiac work-up I do not believe she is experiencing acute myocardial infarction.  In addition, she has a negative D-dimer and she is not hypoxic, tach Templar tachycardic and do not believe she has a pulmonary embolism.  She has no clinical historical complaints that is consistent with aortic aneurysm or dissection.  She did states she has some back pain associated with her condition but is more musculoskeletal.  In addition she has a negative D-dimer and likely them aortic dissection with a negative dimer and the vague presentation is extremely low therefore CT pulmonary angiogram and CT thoracicoabdominal evaluation was not completed.  Patient received Toradol for pain was singly improved per nursing staff.  The patient will be discharged with strict return precautions.  I do not know the cause of her condition that could be GI, infectious such as costochondritis or other conditions.        ADDITIONAL PROBLEM LIST  Hypertension, schizophrenia, GERD,  DISPOSITION AND DISCUSSIONS      Decision tools and prescription drugs considered including, but not limited to: Due to D-dimer with low pretest probability generally the patient has a pulmonary embolism.  In addition Jemperli she has aortic dissection aortic aneurysm.  FINAL DIAGNOSIS  1. Chest pain, unspecified type      DISPOSITION:  Patient will be discharged home in stable condition.    FOLLOW UP:  [facility], Emergency Dept  [address] [address] [phone]    If symptoms  worsen    Fanny Davila M.D.  [address]     [facility] [address]  [phone]    Schedule an appointment as soon as possible for a visit   As needed      OUTPATIENT MEDICATIONS:  Discharge Medication List as of 4/27/2023  6:55 PM        Electronically signed by: Kranthi Hampton D.O., 4/27/2023 4:29 PM

## 2023-04-27 NOTE — ED TRIAGE NOTES
Lorene Haro  47 y.o.  Chief Complaint   Patient presents with    Chest Pain     Sudden onset at 0600 today  Woke from sleep  Midsternal radiating through to back  Describes pain as 10/10 sharp  Denies N/V     EKG completed prior to triage per protocol.    BIB EMS ambulatory to triage with steady gait. A & O x 4, GCS 15. IV access in place.    FSBS 117 for EMS.    Hx. A-fib on Eliquis. Follows with [facility].    Triage process explained to patient, apologized for wait time, and returned to lobby.

## 2023-04-28 NOTE — ED NOTES
Patient was educated on discharge instructions.  Patient was informed about diagnosis, symptom management, risks, and home care instructions.  Patient verbalized understanding and signed discharge instructions. Copy of discharge instructions in chart.  Patient ambulated out with steady gait.  Patient has personal belongings and PIV removed, tip intact.  Work note given to patient.

## 2023-05-04 ASSESSMENT — ENCOUNTER SYMPTOMS
DIZZINESS: 0
CHILLS: 0
FEVER: 0
HEMOPTYSIS: 0
SHORTNESS OF BREATH: 1
VOMITING: 0
DIAPHORESIS: 0
MYALGIAS: 0
WEAKNESS: 0
SINUS PAIN: 0
WHEEZING: 0
FALLS: 0
HEADACHES: 0
PALPITATIONS: 0
DIARRHEA: 0
NAUSEA: 0

## 2023-05-04 NOTE — PROGRESS NOTES
Pulmonary Clinic Note    Date of Visit: 5/15/2023     Chief Complaint:  Chief Complaint   Patient presents with    Follow-Up     Last seen 1/26/23 HENRIETTA Harper     HPI:   Lorene Haro is a very pleasant 47 y.o. year old female former smoker (20+ pack-years, quit in 2022), with a PMHx of chronic back pain with sciatica, COVID-19 pneumonia in October 2021, atrial fibrillation, anxiety, secondary hypercoagulable state, lower extremity edema, bipolar/schizophrenia, obesity hypoventilation syndrome  who presented to the Pulmonary Clinic for a regular follow up. Last seen in the office on 1/26/2023 with myself.     Patient is followed by the pulmonary office for the next restrictive obstructive disease.  PFTs in 2021 show an FVC of 1.45 L or 46%, FEV1 1.24 L or 48%, FEV1/FVC 86%, %, TLC 78%, and DLCO 85% predicted, with a positive bronchodilator response.  CTA in September and May 2022 did not show any abnormalities.  Echocardiogram in March 2022 showed RVSP of 35 mmHg.  Patient does carry a diagnosis of KATHIA and is currently on AVAPS machine being followed by the sleep center.  She has frequent ER visits for chest pain and/or shortness of breath.    Interval Events:  1/26/2023-   Patient presents with care team from [facility].  Patient was previously living by herself.  She has been seen in the ER as well as urgent care for increased shortness of breath.  Patient has intermittently been using her Advair, which she is largely attributed to lack of refills.  She will use albuterol 1-2 times a week.  She also was previously on 20 mg of Lasix, which she again has not been able to use because she does not have refills.  Symptomatically, she is short of breath with mMRC of 2-3.  Has a cough that is worse at night when she lays down, occasional sputum production that is yellow, and denies any wheezing.  Patient was previously ordered an AVAPS machine in August, which was sent to [facility].  Patient has never  received this machine.    5/15/2023-  Since her last appointment, patient has switched to a different group home.  She has still not received any AVAPS machine from [facility].  Symptomatically, she states she is short of breath with mMRC of 4 but denies any cough, sputum production, wheezing, reflux.  She continues to use Advair and rare albuterol use.  Patient continues to use 2 LPM of oxygen on exertion and at night.      Exacerbations this year:  0     Current medication regimen:  Advair and albuterol    Oxygen use:   2 LPM on exertion and at night    MMRC Grade:  4- Breathless with ambulating around house or ADLs    Past Medical History:   Diagnosis Date    A-fib ([facility])     Asthma     Bipolar 2 disorder ([facility])     Chickenpox     Hypertension     On home oxygen therapy     Other psychological stress     Schizophrenic disorder ([facility])     Yeast infection of the skin 2021     Past Surgical History:   Procedure Laterality Date    CHOLECYSTECTOMY      COLECTOMY      HYSTERECTOMY LAPAROSCOPY      KNEE ARTHROSCOPY Left      Social History     Socioeconomic History    Marital status: Single     Spouse name: Not on file    Number of children: Not on file    Years of education: Not on file    Highest education level: Not on file   Occupational History    Not on file   Tobacco Use    Smoking status: Former     Packs/day: 0.25     Types: Cigarettes     Quit date: 10/12/2022     Years since quittin.5    Smokeless tobacco: Never   Vaping Use    Vaping Use: Never used   Substance and Sexual Activity    Alcohol use: Never    Drug use: Never    Sexual activity: Not Currently   Other Topics Concern    Not on file   Social History Narrative    ** Merged History Encounter **         From [place]     Social Determinants of Health     Financial Resource Strain: Medium Risk (2022)    Overall Financial Resource Strain (CARDIA)     Difficulty of Paying Living Expenses: Somewhat hard   Food Insecurity: Food Insecurity Present  (5/4/2022)    Hunger Vital Sign     Worried About Running Out of Food in the Last Year: Often true     Ran Out of Food in the Last Year: Often true   Transportation Needs: No Transportation Needs (5/4/2022)    PRAPARE - Transportation     Lack of Transportation (Medical): No     Lack of Transportation (Non-Medical): No   Physical Activity: Not on file   Stress: Not on file   Social Connections: Not on file   Intimate Partner Violence: Not on file   Housing Stability: Not on file        Family History   Problem Relation Age of Onset    No Known Problems Mother     Cancer Sister      Current Outpatient Medications on File Prior to Visit   Medication Sig Dispense Refill    ADVAIR DISKUS 100-50 MCG/ACT AEROSOL POWDER, BREATH ACTIVATED INHALE 1 PUFF BY MOUTH EVERY 12 HOURS 90 Each 3    metoprolol SR (TOPROL XL) 25 MG TABLET SR 24 HR Take 0.5 Tablets by mouth every day. 50 Tablet 3    famotidine (PEPCID) 20 MG Tab TAKE 1 TABLET BY MOUTH TWICE DAILY. 60 Tablet 0    apixaban (ELIQUIS) 5mg Tab Take 1 Tablet by mouth 2 times a day. 180 Tablet 3    furosemide (LASIX) 20 MG Tab Take 1 Tablet by mouth every day. 90 Tablet 0    potassium chloride (KLOR-CON) 20 MEQ Pack Take 1 Packet by mouth every day. 90 Each 0    albuterol 108 (90 Base) MCG/ACT Aero Soln inhalation aerosol Inhale 2 Puffs every 6 hours as needed for Shortness of Breath. 18 g 3    aripiprazole (ABILIFY) 30 MG tablet Take 1 Tablet by mouth every morning. Indications: Major Depressive Disorder 30 Tablet 0    atorvastatin (LIPITOR) 20 MG Tab Take 1 Tablet by mouth at bedtime. 30 Tablet 0    montelukast (SINGULAIR) 10 MG Tab Take 1 Tablet by mouth every evening. 30 Tablet 0    sennosides (SENOKOT) 8.6 MG Tab Take 1 Tablet by mouth 1 time a day as needed (constipation). 30 Tablet 0    traZODone (DESYREL) 50 MG Tab Take 1 Tablet by mouth at bedtime. 30 Tablet 3     No current facility-administered medications on file prior to visit.     Allergies: Penicillin g and  "Amoxicillin    ROS:   Review of Systems   Constitutional:  Negative for chills, diaphoresis, fever and malaise/fatigue.   HENT:  Negative for congestion and sinus pain.    Respiratory:  Positive for shortness of breath. Negative for cough, hemoptysis, sputum production and wheezing.    Cardiovascular:  Negative for chest pain, palpitations and leg swelling.   Gastrointestinal:  Negative for diarrhea, heartburn, nausea and vomiting.   Musculoskeletal:  Negative for falls and myalgias.   Neurological:  Negative for dizziness, weakness and headaches.     Vitals:  /64 (BP Location: Right arm, Patient Position: Sitting, BP Cuff Size: Adult)   Pulse 61   Resp 16   Ht 1.575 m (5' 2\")   Wt 116 kg (256 lb)   SpO2 93%     Physical Exam  Constitutional:       General: She is not in acute distress.     Appearance: Normal appearance. She is not ill-appearing, toxic-appearing or diaphoretic.   Cardiovascular:      Rate and Rhythm: Normal rate and regular rhythm.      Heart sounds: No murmur heard.     No friction rub. No gallop.   Pulmonary:      Effort: No respiratory distress.      Breath sounds: Normal breath sounds. No stridor. No wheezing, rhonchi or rales.   Musculoskeletal:         General: No swelling.      Right lower leg: No edema.      Left lower leg: No edema.   Skin:     General: Skin is warm.   Neurological:      General: No focal deficit present.      Mental Status: She is alert and oriented to person, place, and time.   Psychiatric:         Mood and Affect: Mood normal.         Behavior: Behavior normal.         Thought Content: Thought content normal.         Judgment: Judgment normal.         Laboratory Data:  PFTs: (Date: 9/21/2021)-      Impression:  Baseline spirometry shows significant reduction in FVC at 1.45 L or 46% predicted and FEV1 at 1.24 L or 48% predicted with FEV1/FVC ratio of 86 suggesting restriction.  There is significant bronchodilator response with postbronchodilator FEV1 of 1.48 L " or 58% predicted.  Total lung capacity is just below lower limits of normal at 3.61 L or 78% predicted.  Diffusion capacity is within normal limits at 85% predicted.  Pulmonary function testing shows mixed restrictive obstructive defect with significant bronchodilator response.  This could be consistent with COPD in a patient with morbid obesity.  Correlate clinically and with imaging    CXR: (Date: 1/7/2023)-  Impression:  Mild interstitial prominence related to mild pulmonary edema.    CT Chest: (Date: 9/2/2022)-  Impression:  1.  No aortic aneurysm or dissection.  2.  Hepatosplenomegaly    ECHO: (Date: 2/27/2022)-  Impression:  Compared to the prior echo on 03/20/21, left ventricular systolic   function is now hyperdynamic with Doppler study showing mild aortic   stenosis.  The left ventricle is mildly dilated.  Mild concentric left ventricular hypertrophy.  Hyperdynamic left ventricular systolic function with estimated ejection   fraction 70%.  No regional wall motion abnormality noted.  Evidence of increased intracavitary gradient with peak velocity 2.6   m/sec.  Aortic valve is not well visualized.  Mild aortic stenosis based on Doppler. Transvalvular gradients are -   Peak:  27 mmHg, Mean: 14 mmHg.  Estimated right ventricular systolic pressure is 35 mmHg.      Assessment and Plan:    Problem List Items Addressed This Visit       Mixed restrictive and obstructive lung disease (HCC)     PFTs in 2021 show an FVC of 1.45 L or 46%, FEV1 1.24 L or 48%, FEV1/FVC 86%, %, TLC 78%, and DLCO 85% predicted, with a positive bronchodilator response.  Patient recently quit smoking in 2022.  Patient has been intermittently using Advair.  She also use albuterol 1-2 times a week.  Restriction is most likely related to her BMI.    -- Continue Advair  -- Continue albuterol if needed           Chronic hypoxic respiratory failure (HCC)     Patient is currently on 2 LPM of oxygen on exertion and at night until AVAPS can be  acquired.           Gastroesophageal reflux disease without esophagitis     Patient states that her cough is worse at night when she lays down.  She states she will experience intermittent heartburn.  Patient is currently on Pepcid 20 mg daily  -- Pepcid 20 mg daily  -- Lifestyle modifications such as: keep head of the bed elevated at 30 degrees, avoid cafeinated drinks when possible even during the day, avoid eating anything 2 hours prior to going to bed           Morbid obesity with alveolar hypoventilation (HCC)     Patient has still not received machine from [facility].  We will send the order to [facility].  I have instructed the patient to call later this week to ensure that they are working on the order.            Diagnostic studies have been reviewed with the patient.    Return in about 6 months (around 11/15/2023), or if symptoms worsen or fail to improve, for Pulmonary- 6 months.     This note was generated using voice recognition software which has a chance of producing errors of grammar and possibly content.  I have made every reasonable attempt to find and correct any obvious errors, but it should be expected that some may not be found prior to finalization of this note.    Time spent in record review prior to patient arrival, reviewing results, and in face-to-face encounter totaled 23 min.  __________  HENRIETTA Wu  Pulmonary Medicine  [facility]

## 2023-05-15 ENCOUNTER — OFFICE VISIT (OUTPATIENT)
Dept: SLEEP MEDICINE | Facility: MEDICAL CENTER | Age: 48
End: 2023-05-15
Payer: MEDICAID

## 2023-05-15 VITALS
WEIGHT: 256 LBS | SYSTOLIC BLOOD PRESSURE: 110 MMHG | RESPIRATION RATE: 16 BRPM | BODY MASS INDEX: 47.11 KG/M2 | OXYGEN SATURATION: 93 % | DIASTOLIC BLOOD PRESSURE: 64 MMHG | HEART RATE: 61 BPM | HEIGHT: 62 IN

## 2023-05-15 DIAGNOSIS — J43.9 MIXED RESTRICTIVE AND OBSTRUCTIVE LUNG DISEASE (HCC): ICD-10-CM

## 2023-05-15 DIAGNOSIS — K21.9 GASTROESOPHAGEAL REFLUX DISEASE WITHOUT ESOPHAGITIS: ICD-10-CM

## 2023-05-15 DIAGNOSIS — J96.11 CHRONIC RESPIRATORY FAILURE WITH HYPOXIA (HCC): ICD-10-CM

## 2023-05-15 DIAGNOSIS — J98.4 MIXED RESTRICTIVE AND OBSTRUCTIVE LUNG DISEASE (HCC): ICD-10-CM

## 2023-05-15 DIAGNOSIS — E66.2 MORBID OBESITY WITH ALVEOLAR HYPOVENTILATION (HCC): ICD-10-CM

## 2023-05-15 PROCEDURE — 3078F DIAST BP <80 MM HG: CPT

## 2023-05-15 PROCEDURE — 99213 OFFICE O/P EST LOW 20 MIN: CPT

## 2023-05-15 PROCEDURE — 3074F SYST BP LT 130 MM HG: CPT

## 2023-05-15 ASSESSMENT — ENCOUNTER SYMPTOMS
SPUTUM PRODUCTION: 0
HEARTBURN: 0
COUGH: 0

## 2023-05-15 ASSESSMENT — FIBROSIS 4 INDEX: FIB4 SCORE: 0.6

## 2023-05-15 NOTE — ASSESSMENT & PLAN NOTE
Patient states that her cough is worse at night when she lays down.  She states she will experience intermittent heartburn.  Patient is currently on Pepcid 20 mg daily  -- Pepcid 20 mg daily  -- Lifestyle modifications such as: keep head of the bed elevated at 30 degrees, avoid cafeinated drinks when possible even during the day, avoid eating anything 2 hours prior to going to bed

## 2023-05-15 NOTE — ASSESSMENT & PLAN NOTE
PFTs in 2021 show an FVC of 1.45 L or 46%, FEV1 1.24 L or 48%, FEV1/FVC 86%, %, TLC 78%, and DLCO 85% predicted, with a positive bronchodilator response.  Patient recently quit smoking in 2022.  Patient has been intermittently using Advair.  She also use albuterol 1-2 times a week.  Restriction is most likely related to her BMI.    -- Continue Advair  -- Continue albuterol if needed

## 2023-05-15 NOTE — ASSESSMENT & PLAN NOTE
Patient has still not received machine from [facility].  We will send the order to [facility].  I have instructed the patient to call later this week to ensure that they are working on the order.

## 2023-05-17 ENCOUNTER — OFFICE VISIT (OUTPATIENT)
Dept: CARDIOLOGY | Facility: MEDICAL CENTER | Age: 48
End: 2023-05-17
Attending: INTERNAL MEDICINE
Payer: MEDICAID

## 2023-05-17 VITALS
SYSTOLIC BLOOD PRESSURE: 114 MMHG | HEART RATE: 64 BPM | HEIGHT: 62 IN | RESPIRATION RATE: 14 BRPM | OXYGEN SATURATION: 93 % | DIASTOLIC BLOOD PRESSURE: 72 MMHG | BODY MASS INDEX: 47.11 KG/M2 | WEIGHT: 256 LBS

## 2023-05-17 DIAGNOSIS — Z45.09 ENCOUNTER FOR LOOP RECORDER CHECK: ICD-10-CM

## 2023-05-17 DIAGNOSIS — I48.0 PAROXYSMAL ATRIAL FIBRILLATION (HCC): ICD-10-CM

## 2023-05-17 PROCEDURE — 3074F SYST BP LT 130 MM HG: CPT | Performed by: INTERNAL MEDICINE

## 2023-05-17 PROCEDURE — 93005 ELECTROCARDIOGRAM TRACING: CPT | Performed by: INTERNAL MEDICINE

## 2023-05-17 PROCEDURE — 93291 INTERROG DEV EVAL SCRMS IP: CPT | Performed by: INTERNAL MEDICINE

## 2023-05-17 PROCEDURE — 99213 OFFICE O/P EST LOW 20 MIN: CPT | Performed by: INTERNAL MEDICINE

## 2023-05-17 PROCEDURE — 99204 OFFICE O/P NEW MOD 45 MIN: CPT | Performed by: INTERNAL MEDICINE

## 2023-05-17 PROCEDURE — 3078F DIAST BP <80 MM HG: CPT | Performed by: INTERNAL MEDICINE

## 2023-05-17 RX ORDER — ASPIRIN 81 MG/1
81 TABLET ORAL DAILY
COMMUNITY
End: 2023-08-01

## 2023-05-17 ASSESSMENT — FIBROSIS 4 INDEX: FIB4 SCORE: 0.6

## 2023-05-17 NOTE — PROGRESS NOTES
Arrhythmia Clinic Note (New patient)     DOS: 2023    Referring physician: Dr Borrego    Chief complaint/Reason for consult: Afib    HPI: 48 y/o F with ILR in situ placed last summer for palpitations. Found low burden Afib <0.1%. Pt placed on OAC despite low [id number]. Here for evaluation. Noted palpitations the past few months but ILR confirms no recent episodes since , and that episode was 48 min occurring nocturnally.     ROS (+ highlighted in bold):  Constitutional: Fevers/chills/fatigue/weightloss  HEENT: Blurry vision/eye pain/sore throat/hearing loss  Respiratory: Shortness of breath/cough  Cardiovascular: Chest pain/palpitations/edema/orthopnea/syncope  GI: Nausea/vomitting/diarrhea  MSK: Arthralgias/myagias/muscle weakness  Skin: Rash/sores  Neurological: Numbness/tremors/vertigo  Endocrine: Excessive thirst/polyuria/cold intolerance/heat intolerance  Psych: Depression/anxiety    Past Medical History:   Diagnosis Date    A-fib (HCC)     Asthma     Bipolar 2 disorder (HCC)     Chickenpox     Hypertension     On home oxygen therapy     Other psychological stress     Schizophrenic disorder (HCC)     Yeast infection of the skin 2021       Past Surgical History:   Procedure Laterality Date    CHOLECYSTECTOMY      COLECTOMY      HYSTERECTOMY LAPAROSCOPY      KNEE ARTHROSCOPY Left        Social History     Socioeconomic History    Marital status: Single     Spouse name: Not on file    Number of children: Not on file    Years of education: Not on file    Highest education level: Not on file   Occupational History    Not on file   Tobacco Use    Smoking status: Former     Packs/day: 0.25     Types: Cigarettes     Quit date: 10/12/2022     Years since quittin.5    Smokeless tobacco: Never   Vaping Use    Vaping Use: Never used   Substance and Sexual Activity    Alcohol use: Never    Drug use: Never    Sexual activity: Not Currently   Other Topics Concern    Not on file   Social History Narrative     ** Merged History Encounter **         From [facility]     Financial Resource Strain: Medium Risk (5/4/2022)    Overall Financial Resource Strain (CARDIA)     Difficulty of Paying Living Expenses: Somewhat hard   Food Insecurity: Food Insecurity Present (5/4/2022)    Hunger Vital Sign     Worried About Running Out of Food in the Last Year: Often true     Ran Out of Food in the Last Year: Often true   Transportation Needs: No Transportation Needs (5/4/2022)    PRAPARE - Transportation     Lack of Transportation (Medical): No     Lack of Transportation (Non-Medical): No   Physical Activity: Not on file   Stress: Not on file   Social Connections: Not on file   Intimate Partner Violence: Not on file   Housing Stability: Not on file       Family History   Problem Relation Age of Onset    No Known Problems Mother     Cancer Sister        Allergies   Allergen Reactions    Penicillin G Rash and Itching     pt reports that she gets a rash all over her body and gets itchy    Amoxicillin Rash and Itching     Tolerates cephalosporins, pt reports that she gets a rash all over her body and gets itchy       Current Outpatient Medications   Medication Sig Dispense Refill    aspirin 81 MG EC tablet Take 81 mg by mouth every day.      ADVAIR DISKUS 100-50 MCG/ACT AEROSOL POWDER, BREATH ACTIVATED INHALE 1 PUFF BY MOUTH EVERY 12 HOURS 90 Each 3    metoprolol SR (TOPROL XL) 25 MG TABLET SR 24 HR Take 0.5 Tablets by mouth every day. 50 Tablet 3    famotidine (PEPCID) 20 MG Tab TAKE 1 TABLET BY MOUTH TWICE DAILY. 60 Tablet 0    furosemide (LASIX) 20 MG Tab Take 1 Tablet by mouth every day. 90 Tablet 0    potassium chloride (KLOR-CON) 20 MEQ Pack Take 1 Packet by mouth every day. 90 Each 0    albuterol 108 (90 Base) MCG/ACT Aero Soln inhalation aerosol Inhale 2 Puffs every 6 hours as needed for Shortness of Breath. 18 g 3    aripiprazole (ABILIFY) 30 MG tablet Take 1 Tablet by mouth every morning.  "Indications: Major Depressive Disorder 30 Tablet 0    atorvastatin (LIPITOR) 20 MG Tab Take 1 Tablet by mouth at bedtime. 30 Tablet 0    montelukast (SINGULAIR) 10 MG Tab Take 1 Tablet by mouth every evening. 30 Tablet 0    sennosides (SENOKOT) 8.6 MG Tab Take 1 Tablet by mouth 1 time a day as needed (constipation). 30 Tablet 0    traZODone (DESYREL) 50 MG Tab Take 1 Tablet by mouth at bedtime. 30 Tablet 3     No current facility-administered medications for this visit.       Physical Exam:  Vitals:    05/17/23 1111   BP: 114/72   BP Location: Left arm   Patient Position: Sitting   BP Cuff Size: Adult   Pulse: 64   Resp: 14   SpO2: 93%   Weight: 116 kg (256 lb)   Height: 1.575 m (5' 2\")     General appearance: NAD, conversant   Eyes: anicteric sclerae, moist conjunctivae; no lid-lag; PERRLA  HENT: Atraumatic; oropharynx clear with moist mucous membranes and no mucosal ulcerations; normal hard and soft palate  Neck: Trachea midline; FROM, supple, no thyromegaly or lymphadenopathy  Lungs: CTA, with normal respiratory effort and no intercostal retractions  CV: RRR, no MRGs, no JVD   Abdomen: Soft, non-tender; no masses or HSM  Extremities: No peripheral edema or extremity lymphadenopathy  Skin: Normal temperature, turgor and texture; no rash, ulcers or subcutaneous nodules  Psych: Appropriate affect, alert and oriented to person, place and time    Data:  Lipids:   Lab Results   Component Value Date/Time    CHOLSTRLTOT 198 05/10/2021 05:42 AM    TRIGLYCERIDE 126 05/10/2021 05:42 AM    HDL 48 05/10/2021 05:42 AM     (H) 05/10/2021 05:42 AM        BMP:  Lab Results   Component Value Date/Time    SODIUM 139 04/27/2023 1440    POTASSIUM 4.2 04/27/2023 1440    CHLORIDE 102 04/27/2023 1440    CO2 26 04/27/2023 1440    GLUCOSE 86 04/27/2023 1440    BUN 18 04/27/2023 1440    CREATININE 0.98 04/27/2023 1440    CALCIUM 9.5 04/27/2023 1440    ANION 11.0 04/27/2023 1440        TSH:   Lab Results   Component Value Date/Time "    TSHULTRASEN 1.020 05/02/2022 2345        THYROXINE (T4):   No results found for: GAVIOTA     CBC:   Lab Results   Component Value Date/Time    WBC 11.7 (H) 04/27/2023 02:40 PM    RBC 4.72 04/27/2023 02:40 PM    HEMOGLOBIN 12.8 04/27/2023 02:40 PM    HEMATOCRIT 41.5 04/27/2023 02:40 PM    MCV 87.9 04/27/2023 02:40 PM    MCH 27.1 04/27/2023 02:40 PM    MCHC 30.8 (L) 04/27/2023 02:40 PM    RDW 50.7 (H) 04/27/2023 02:40 PM    PLATELETCT 286 04/27/2023 02:40 PM    MPV 11.1 04/27/2023 02:40 PM    NEUTSPOLYS 76.50 (H) 04/27/2023 02:40 PM    LYMPHOCYTES 16.30 (L) 04/27/2023 02:40 PM    MONOCYTES 5.50 04/27/2023 02:40 PM    EOSINOPHILS 0.90 04/27/2023 02:40 PM    BASOPHILS 0.30 04/27/2023 02:40 PM    IMMGRAN 0.50 04/27/2023 02:40 PM    NRBC 0.00 04/27/2023 02:40 PM    NEUTS 8.98 (H) 04/27/2023 02:40 PM    LYMPHS 1.91 04/27/2023 02:40 PM    MONOS 0.64 04/27/2023 02:40 PM    EOS 0.10 04/27/2023 02:40 PM    BASO 0.03 04/27/2023 02:40 PM    IMMGRANAB 0.06 04/27/2023 02:40 PM    NRBCAB 0.00 04/27/2023 02:40 PM        CBC w/o DIFF  Lab Results   Component Value Date/Time    WBC 11.7 (H) 04/27/2023 02:40 PM    RBC 4.72 04/27/2023 02:40 PM    HEMOGLOBIN 12.8 04/27/2023 02:40 PM    MCV 87.9 04/27/2023 02:40 PM    MCH 27.1 04/27/2023 02:40 PM    MCHC 30.8 (L) 04/27/2023 02:40 PM    RDW 50.7 (H) 04/27/2023 02:40 PM    MPV 11.1 04/27/2023 02:40 PM       Prior echo/stress results reviewed: Normal EF    EKG interpreted by me: CAMILLA    ILR interrogation: No recent afib events. Last significant daytime Afib event occurred in November 2022. [place] <0.1%.    Impression/Plan:  1. Paroxysmal atrial fibrillation (HCC)  EKG        pAF  ILR in situ    - [id number] is 1 for female only. Prior notes suggest heart failure as risk factor, however this [id number] risk factor is for systolic heart failure (she has normal EF) and prior notes that suggest diastolic heart failure contradict echo done in October showing normal diastolic function.   - I  will take her off OAC as her risk of bleeding is higher than risk of CVA and guidelines do not support OAC in this population  - Her very low burden afib appears asymptomatic and her complaints of palptiations correlate with normal readings on ILR    She will be discharged from EP, can refer back if significantly increasing burden of Afib with symptoms    Nader Barraza MD  Cardiac Electrophysiology

## 2023-05-25 DIAGNOSIS — G47.33 OSA (OBSTRUCTIVE SLEEP APNEA): ICD-10-CM

## 2023-05-25 DIAGNOSIS — E66.2 MORBID OBESITY WITH ALVEOLAR HYPOVENTILATION (HCC): ICD-10-CM

## 2023-05-26 ENCOUNTER — NON-PROVIDER VISIT (OUTPATIENT)
Dept: CARDIOLOGY | Facility: MEDICAL CENTER | Age: 48
End: 2023-05-26
Payer: MEDICAID

## 2023-05-26 PROCEDURE — 93298 REM INTERROG DEV EVAL SCRMS: CPT | Performed by: INTERNAL MEDICINE

## 2023-05-30 NOTE — CARDIAC REMOTE MONITOR - SCAN
Device transmission reviewed. Device demonstrated appropriate function.       Electronically Signed by: Nader Barraza M.D.    5/31/2023  10:12 AM

## 2023-06-07 ENCOUNTER — TELEPHONE (OUTPATIENT)
Dept: SLEEP MEDICINE | Facility: MEDICAL CENTER | Age: 48
End: 2023-06-07
Payer: MEDICAID

## 2023-06-07 NOTE — TELEPHONE ENCOUNTER
Pharmacies from [facility] called needing documentation that they are cleared to fill/prescribe the patient's Lasix 20 mg and potassium chloride 20 mEq.  I only provided patient with a one-time refill, due to the patient not being established she had at the [facility] and was about to run out of medication.    Maureen Gupta APRN  Pulmonary & Sleep Medicine  [facility]

## 2023-06-10 ENCOUNTER — HOSPITAL ENCOUNTER (EMERGENCY)
Facility: MEDICAL CENTER | Age: 48
End: 2023-06-10
Attending: EMERGENCY MEDICINE
Payer: MEDICAID

## 2023-06-10 ENCOUNTER — APPOINTMENT (OUTPATIENT)
Dept: RADIOLOGY | Facility: MEDICAL CENTER | Age: 48
End: 2023-06-10
Attending: EMERGENCY MEDICINE
Payer: MEDICAID

## 2023-06-10 VITALS
HEART RATE: 64 BPM | SYSTOLIC BLOOD PRESSURE: 108 MMHG | TEMPERATURE: 98.4 F | OXYGEN SATURATION: 91 % | HEIGHT: 62 IN | DIASTOLIC BLOOD PRESSURE: 57 MMHG | BODY MASS INDEX: 48.28 KG/M2 | RESPIRATION RATE: 14 BRPM | WEIGHT: 262.35 LBS

## 2023-06-10 DIAGNOSIS — R07.9 CHEST PAIN, UNSPECIFIED TYPE: ICD-10-CM

## 2023-06-10 LAB
ALBUMIN SERPL BCP-MCNC: 4.1 G/DL (ref 3.2–4.9)
ALBUMIN/GLOB SERPL: 1.3 G/DL
ALP SERPL-CCNC: 173 U/L (ref 30–99)
ALT SERPL-CCNC: 19 U/L (ref 2–50)
ANION GAP SERPL CALC-SCNC: 10 MMOL/L (ref 7–16)
AST SERPL-CCNC: 12 U/L (ref 12–45)
BASOPHILS # BLD AUTO: 0.1 % (ref 0–1.8)
BASOPHILS # BLD: 0.01 K/UL (ref 0–0.12)
BILIRUB SERPL-MCNC: 0.6 MG/DL (ref 0.1–1.5)
BUN SERPL-MCNC: 16 MG/DL (ref 8–22)
CALCIUM ALBUM COR SERPL-MCNC: 9.2 MG/DL (ref 8.5–10.5)
CALCIUM SERPL-MCNC: 9.3 MG/DL (ref 8.5–10.5)
CHLORIDE SERPL-SCNC: 103 MMOL/L (ref 96–112)
CO2 SERPL-SCNC: 27 MMOL/L (ref 20–33)
CREAT SERPL-MCNC: 1.02 MG/DL (ref 0.5–1.4)
EKG IMPRESSION: NORMAL
EOSINOPHIL # BLD AUTO: 0.11 K/UL (ref 0–0.51)
EOSINOPHIL NFR BLD: 0.8 % (ref 0–6.9)
ERYTHROCYTE [DISTWIDTH] IN BLOOD BY AUTOMATED COUNT: 48.5 FL (ref 35.9–50)
GFR SERPLBLD CREATININE-BSD FMLA CKD-EPI: 67 ML/MIN/1.73 M 2
GLOBULIN SER CALC-MCNC: 3.1 G/DL (ref 1.9–3.5)
GLUCOSE SERPL-MCNC: 126 MG/DL (ref 65–99)
HCT VFR BLD AUTO: 38.3 % (ref 37–47)
HGB BLD-MCNC: 11.8 G/DL (ref 12–16)
IMM GRANULOCYTES # BLD AUTO: 0.05 K/UL (ref 0–0.11)
IMM GRANULOCYTES NFR BLD AUTO: 0.4 % (ref 0–0.9)
LYMPHOCYTES # BLD AUTO: 1.96 K/UL (ref 1–4.8)
LYMPHOCYTES NFR BLD: 15.1 % (ref 22–41)
MCH RBC QN AUTO: 27 PG (ref 27–33)
MCHC RBC AUTO-ENTMCNC: 30.8 G/DL (ref 32.2–35.5)
MCV RBC AUTO: 87.6 FL (ref 81.4–97.8)
MONOCYTES # BLD AUTO: 0.66 K/UL (ref 0–0.85)
MONOCYTES NFR BLD AUTO: 5.1 % (ref 0–13.4)
NEUTROPHILS # BLD AUTO: 10.19 K/UL (ref 1.82–7.42)
NEUTROPHILS NFR BLD: 78.5 % (ref 44–72)
NRBC # BLD AUTO: 0 K/UL
NRBC BLD-RTO: 0 /100 WBC (ref 0–0.2)
NT-PROBNP SERPL IA-MCNC: 367 PG/ML (ref 0–125)
PLATELET # BLD AUTO: 264 K/UL (ref 164–446)
PMV BLD AUTO: 10.7 FL (ref 9–12.9)
POTASSIUM SERPL-SCNC: 3.8 MMOL/L (ref 3.6–5.5)
PROT SERPL-MCNC: 7.2 G/DL (ref 6–8.2)
RBC # BLD AUTO: 4.37 M/UL (ref 4.2–5.4)
SODIUM SERPL-SCNC: 140 MMOL/L (ref 135–145)
TROPONIN T SERPL-MCNC: 28 NG/L (ref 6–19)
TROPONIN T SERPL-MCNC: 30 NG/L (ref 6–19)
WBC # BLD AUTO: 13 K/UL (ref 4.8–10.8)

## 2023-06-10 PROCEDURE — 85025 COMPLETE CBC W/AUTO DIFF WBC: CPT

## 2023-06-10 PROCEDURE — 93010 ELECTROCARDIOGRAM REPORT: CPT | Performed by: INTERNAL MEDICINE

## 2023-06-10 PROCEDURE — 700102 HCHG RX REV CODE 250 W/ 637 OVERRIDE(OP): Mod: UD | Performed by: EMERGENCY MEDICINE

## 2023-06-10 PROCEDURE — 36415 COLL VENOUS BLD VENIPUNCTURE: CPT

## 2023-06-10 PROCEDURE — 93005 ELECTROCARDIOGRAM TRACING: CPT

## 2023-06-10 PROCEDURE — 93005 ELECTROCARDIOGRAM TRACING: CPT | Performed by: EMERGENCY MEDICINE

## 2023-06-10 PROCEDURE — 80053 COMPREHEN METABOLIC PANEL: CPT

## 2023-06-10 PROCEDURE — 83880 ASSAY OF NATRIURETIC PEPTIDE: CPT

## 2023-06-10 PROCEDURE — A9270 NON-COVERED ITEM OR SERVICE: HCPCS | Mod: UD | Performed by: EMERGENCY MEDICINE

## 2023-06-10 PROCEDURE — 84484 ASSAY OF TROPONIN QUANT: CPT

## 2023-06-10 PROCEDURE — 71045 X-RAY EXAM CHEST 1 VIEW: CPT

## 2023-06-10 PROCEDURE — 99285 EMERGENCY DEPT VISIT HI MDM: CPT

## 2023-06-10 RX ORDER — ASPIRIN 81 MG/1
324 TABLET, CHEWABLE ORAL ONCE
Status: COMPLETED | OUTPATIENT
Start: 2023-06-10 | End: 2023-06-10

## 2023-06-10 RX ADMIN — ASPIRIN 81 MG 324 MG: 81 TABLET ORAL at 21:55

## 2023-06-10 ASSESSMENT — FIBROSIS 4 INDEX: FIB4 SCORE: 0.6

## 2023-06-11 LAB — EKG IMPRESSION: NORMAL

## 2023-06-11 NOTE — ED PROVIDER NOTES
ED Provider Note    CHIEF COMPLAINT  Chief Complaint   Patient presents with    Shortness of Breath    Chest Pain     X 2 days       EXTERNAL RECORDS REVIEWED  Inpatient Notes  , Outpatient Notes  , and External ED Note      HPI/ROS  LIMITATION TO HISTORY   Select: : None  OUTSIDE HISTORIAN(S):      Lorene Haro is a 47 y.o. female who presents to the emergency department with chief complaint of left lower chest pain.  Patient does report that its worse 6 with a exertion she is also had some exertional dyspnea.  Patient has extensive cardiac and pulmonary history is at our facility as well as neighboring facilities frequently she followed up for device check with her cardiologist for her loop recorder approximately 2 weeks ago and had no abnormal findings.  Patient's been taken off oxygen during the day but still continues to wear 2 L via nasal cannula at night.  No fevers stated she has chronic cough that has not changed no other acute symptom changes or concerns at this time.    PAST MEDICAL HISTORY   has a past medical history of A-fib (HCC), Asthma, Bipolar 2 disorder (HCC), Chickenpox, Chronic obstructive pulmonary disease (HCC), Hypertension, On home oxygen therapy, Other psychological stress, Schizophrenic disorder (HCC), and Yeast infection of the skin (2021).    SURGICAL HISTORY   has a past surgical history that includes hysterectomy laparoscopy; colectomy; cholecystectomy; and knee arthroscopy (Left).    FAMILY HISTORY  Family History   Problem Relation Age of Onset    No Known Problems Mother     Cancer Sister        SOCIAL HISTORY  Social History     Tobacco Use    Smoking status: Former     Packs/day: 0.25     Types: Cigarettes     Quit date: 10/12/2022     Years since quittin.6    Smokeless tobacco: Never   Vaping Use    Vaping Use: Never used   Substance and Sexual Activity    Alcohol use: Never    Drug use: Never    Sexual activity: Not Currently       CURRENT MEDICATIONS  Home  "Medications       Reviewed by Noy Lea R.N. (Registered Nurse) on 06/10/23 at 1946  Med List Status: Not Addressed     Medication Last Dose Status   ADVAIR DISKUS 100-50 MCG/ACT AEROSOL POWDER, BREATH ACTIVATED  Active   albuterol 108 (90 Base) MCG/ACT Aero Soln inhalation aerosol  Active   aripiprazole (ABILIFY) 30 MG tablet  Active   aspirin 81 MG EC tablet  Active   atorvastatin (LIPITOR) 20 MG Tab  Active   famotidine (PEPCID) 20 MG Tab  Active   fluticasone (FLONASE) 50 MCG/ACT nasal spray  Active   furosemide (LASIX) 20 MG Tab  Active   metoprolol SR (TOPROL XL) 25 MG TABLET SR 24 HR  Active   montelukast (SINGULAIR) 10 MG Tab  Active   potassium chloride (KLOR-CON) 20 MEQ Pack  Active   sennosides (SENOKOT) 8.6 MG Tab  Active   traZODone (DESYREL) 50 MG Tab  Active                    ALLERGIES  Allergies   Allergen Reactions    Penicillin G Rash and Itching     pt reports that she gets a rash all over her body and gets itchy    Amoxicillin Rash and Itching     Tolerates cephalosporins, pt reports that she gets a rash all over her body and gets itchy       PHYSICAL EXAM  VITAL SIGNS: /73   Pulse 66   Temp 36.1 °C (97 °F) (Temporal)   Resp 18   Ht 1.575 m (5' 2\")   Wt 119 kg (262 lb 5.6 oz)   LMP  (LMP Unknown)   SpO2 96% Comment: Room air  BMI 47.98 kg/m²    Pulse ox interpretation:  interpret this pulse ox as normal.  Constitutional: Alert and oriented x 3, minimal distress  HEENT: Atraumatic normocephalic, pupils are equal round reactive to light extraocular movements are intact. The nares is clear, external ears are normal, mouth shows moist mucous membranes normal dentition for age  Neck: Supple, no JVD no tracheal deviation  Cardiovascular: Regular rate and rhythm no murmur rub or gallop 2+ pulses peripherally x4  Thorax & Lungs: No respiratory distress, no wheezes rales or rhonchi, No chest tenderness.   GI: Soft nontender nondistended positive bowel sounds, no peritoneal " signs  Skin: Warm dry no acute rash or lesion  Musculoskeletal: Moving all extremities with full range and 5 of 5 strength no acute  deformity  Neurologic: Cranial nerves III through XII are grossly intact no sensory deficit no cerebellar dysfunction   Psychiatric: Appropriate affect for situation at this time      DIAGNOSTIC STUDIES / PROCEDURES  Results for orders placed or performed during the hospital encounter of 06/10/23   CBC with Differential   Result Value Ref Range    WBC 13.0 (H) 4.8 - 10.8 K/uL    RBC 4.37 4.20 - 5.40 M/uL    Hemoglobin 11.8 (L) 12.0 - 16.0 g/dL    Hematocrit 38.3 37.0 - 47.0 %    MCV 87.6 81.4 - 97.8 fL    MCH 27.0 27.0 - 33.0 pg    MCHC 30.8 (L) 32.2 - 35.5 g/dL    RDW 48.5 35.9 - 50.0 fL    Platelet Count 264 164 - 446 K/uL    MPV 10.7 9.0 - 12.9 fL    Neutrophils-Polys 78.50 (H) 44.00 - 72.00 %    Lymphocytes 15.10 (L) 22.00 - 41.00 %    Monocytes 5.10 0.00 - 13.40 %    Eosinophils 0.80 0.00 - 6.90 %    Basophils 0.10 0.00 - 1.80 %    Immature Granulocytes 0.40 0.00 - 0.90 %    Nucleated RBC 0.00 0.00 - 0.20 /100 WBC    Neutrophils (Absolute) 10.19 (H) 1.82 - 7.42 K/uL    Lymphs (Absolute) 1.96 1.00 - 4.80 K/uL    Monos (Absolute) 0.66 0.00 - 0.85 K/uL    Eos (Absolute) 0.11 0.00 - 0.51 K/uL    Baso (Absolute) 0.01 0.00 - 0.12 K/uL    Immature Granulocytes (abs) 0.05 0.00 - 0.11 K/uL    NRBC (Absolute) 0.00 K/uL   Complete Metabolic Panel (CMP)   Result Value Ref Range    Sodium 140 135 - 145 mmol/L    Potassium 3.8 3.6 - 5.5 mmol/L    Chloride 103 96 - 112 mmol/L    Co2 27 20 - 33 mmol/L    Anion Gap 10.0 7.0 - 16.0    Glucose 126 (H) 65 - 99 mg/dL    Bun 16 8 - 22 mg/dL    Creatinine 1.02 0.50 - 1.40 mg/dL    Calcium 9.3 8.5 - 10.5 mg/dL    AST(SGOT) 12 12 - 45 U/L    ALT(SGPT) 19 2 - 50 U/L    Alkaline Phosphatase 173 (H) 30 - 99 U/L    Total Bilirubin 0.6 0.1 - 1.5 mg/dL    Albumin 4.1 3.2 - 4.9 g/dL    Total Protein 7.2 6.0 - 8.2 g/dL    Globulin 3.1 1.9 - 3.5 g/dL    A-G Ratio  1.3 g/dL   Troponins NOW   Result Value Ref Range    Troponin T 30 (H) 6 - 19 ng/L   Troponins in two (2) hours   Result Value Ref Range    Troponin T 28 (H) 6 - 19 ng/L   proBrain Natriuretic Peptide, NT   Result Value Ref Range    NT-proBNP 367 (H) 0 - 125 pg/mL   CORRECTED CALCIUM   Result Value Ref Range    Correct Calcium 9.2 8.5 - 10.5 mg/dL   ESTIMATED GFR   Result Value Ref Range    GFR (CKD-EPI) 67 >60 mL/min/1.73 m 2   EKG (NOW)   Result Value Ref Range    Report       [facility] Emergency [facility]    Test Date:  2023-06-10  Pt Name:    ADELINA MILLAN                Department: ER  MRN:        [id number]                      Room:  Gender:     Female                       Technician: [id number]  :        1975                   Requested By:ER TRIAGE PROTOCOL  Order #:    [id number]                    Reading MD:    Measurements  Intervals                                Axis  Rate:       72                           P:  DE:                                      QRS:        60  QRSD:       106                          T:          32  QT:         385  QTc:        422    Interpretive Statements  Atrial fibrillation  Low voltage, precordial leads  Compared to ECG 2023 11:21:33  Low QRS voltage now present  Sinus bradycardia no longer present  Atrial premature complex(es) no longer present             RADIOLOGY  DX-CHEST-PORTABLE (1 VIEW)   Final Result      No acute cardiopulmonary disease.            COURSE & MEDICAL DECISION MAKING    ED Observation Status? Yes; I am placing the patient in to an observation status due to a diagnostic uncertainty as well as therapeutic intensity. Patient placed in observation status at 20:33 AM, 6/10/2023.     Observation plan is as follows: EKG Labs and images    Upon Reevaluation, the patient's condition has: Improved; and will be discharged.    Patient discharged from ED Observation status at 2310 (Time) 6/10/23 (Date).     INITIAL ASSESSMENT, COURSE  "AND PLAN  Care Narrative: Pleasant 47-year-old female well-known to our facility for chronic obstructive pulmonary disease and history of recurrent chest pain/shortness of breath.  Initial EKG is nonischemic patient's initial troponin 30 which is at her baseline she was observed for 2 hours repeat troponin is actually decreased 28 she is having no further chest pain at this time her vital signs are all unremarkable.  Patient has well-established with cardiology and has close follow-up with them.  She is instructed to follow-up with cardiology as scheduled to return here should she have worsening chest pain shortness of breath any other acute symptom changes or concerns otherwise discharged in stable and improved condition.  HTN/IDDM FOLLOW UP:  The patient has known hypertension and is being followed by their primary care doctor      ADDITIONAL PROBLEM LIST    DISPOSITION AND DISCUSSIONS    I have discussed management of the patient with the following physicians and JAY's:      Discussion of management with other QHP or appropriate source(s): None     Escalation of care considered, and ultimately not performed:acute inpatient care management, however at this time, the patient is most appropriate for outpatient management    Barriers to care at this time, including but not limited to: Patient lacks transportation .     Decision tools and prescription drugs considered including, but not limited to: .  /57   Pulse 64   Temp 36.9 °C (98.4 °F) (Temporal)   Resp 14   Ht 1.575 m (5' 2\")   Wt 119 kg (262 lb 5.6 oz)   LMP  (LMP Unknown)   SpO2 91%   BMI 47.98 kg/m²   Heriberto Sharp M.D.  [address] [facility] [address]  [phone]          [facility], Emergency Dept  [address] [address] [phone]    in 12-24 hours if symptoms persist, immediately If symptoms worsen, or if you develop any other symptoms or concerns        FINAL DIAGNOSIS  1. Chest pain, unspecified " type

## 2023-06-11 NOTE — ED TRIAGE NOTES
"  Chief Complaint   Patient presents with    Shortness of Breath    Chest Pain     X 2 days     Pt presents to triage for sharp left lateral CP for 2 days w/ SOB. Pt states pain is worse with inspiration. Pt also reports generalized fatigue x 2 weeks and a mild productive cough. Pt speaking in full sentences w/ normal WOB. EKG obtained prior to triage.      Pt is alert/oriented, following commands, and answering questions appropriately. Pt placed in lobby and educated on triage process. Pt encouraged to alert staff for any changes in condition.    ./73   Pulse 66   Temp 36.1 °C (97 °F) (Temporal)   Resp 18   Ht 1.575 m (5' 2\")   Wt 119 kg (262 lb 5.6 oz)   LMP  (LMP Unknown)   SpO2 96% Comment: Room air  BMI 47.98 kg/m²       "

## 2023-06-11 NOTE — ED NOTES
Patient discharged home per ERP.  Discharge teaching and education discussed with patient. POC discussed.   Patient verbalized understanding of discharge teaching and education. No other questions at this time.      PIV removed.     VSS. Patient alert and oriented. Patient arranged ride for self. Able to ambulate off unit safely with steady gait using walker

## 2023-06-12 DIAGNOSIS — K21.9 GASTROESOPHAGEAL REFLUX DISEASE WITHOUT ESOPHAGITIS: ICD-10-CM

## 2023-06-13 ENCOUNTER — TELEPHONE (OUTPATIENT)
Dept: SLEEP MEDICINE | Facility: MEDICAL CENTER | Age: 48
End: 2023-06-13

## 2023-06-13 ENCOUNTER — OFFICE VISIT (OUTPATIENT)
Dept: URGENT CARE | Facility: CLINIC | Age: 48
End: 2023-06-13
Payer: MEDICAID

## 2023-06-13 VITALS
DIASTOLIC BLOOD PRESSURE: 64 MMHG | OXYGEN SATURATION: 95 % | HEIGHT: 61 IN | WEIGHT: 255 LBS | RESPIRATION RATE: 16 BRPM | BODY MASS INDEX: 48.15 KG/M2 | HEART RATE: 62 BPM | TEMPERATURE: 97.8 F | SYSTOLIC BLOOD PRESSURE: 102 MMHG

## 2023-06-13 DIAGNOSIS — J02.9 PHARYNGITIS, UNSPECIFIED ETIOLOGY: ICD-10-CM

## 2023-06-13 LAB — S PYO DNA SPEC NAA+PROBE: NOT DETECTED

## 2023-06-13 PROCEDURE — 3074F SYST BP LT 130 MM HG: CPT | Performed by: PHYSICIAN ASSISTANT

## 2023-06-13 PROCEDURE — 3078F DIAST BP <80 MM HG: CPT | Performed by: PHYSICIAN ASSISTANT

## 2023-06-13 PROCEDURE — 99213 OFFICE O/P EST LOW 20 MIN: CPT | Performed by: PHYSICIAN ASSISTANT

## 2023-06-13 PROCEDURE — 87651 STREP A DNA AMP PROBE: CPT | Performed by: PHYSICIAN ASSISTANT

## 2023-06-13 RX ORDER — DEXAMETHASONE SODIUM PHOSPHATE 10 MG/ML
10 INJECTION INTRAMUSCULAR; INTRAVENOUS ONCE
Status: COMPLETED | OUTPATIENT
Start: 2023-06-13 | End: 2023-06-13

## 2023-06-13 RX ORDER — FAMOTIDINE 20 MG/1
TABLET, FILM COATED ORAL
Qty: 60 TABLET | Refills: 0 | Status: SHIPPED | OUTPATIENT
Start: 2023-06-13 | End: 2023-07-01

## 2023-06-13 RX ADMIN — DEXAMETHASONE SODIUM PHOSPHATE 10 MG: 10 INJECTION INTRAMUSCULAR; INTRAVENOUS at 12:40

## 2023-06-13 ASSESSMENT — ENCOUNTER SYMPTOMS
SINUS PAIN: 0
CHILLS: 0
TROUBLE SWALLOWING: 1
MYALGIAS: 0
COUGH: 1
SWOLLEN GLANDS: 0
NAUSEA: 0
SPUTUM PRODUCTION: 0
VOMITING: 0
FEVER: 0
WHEEZING: 0
SHORTNESS OF BREATH: 0
STRIDOR: 0
SORE THROAT: 1
HEADACHES: 0
DIARRHEA: 0
HOARSE VOICE: 0

## 2023-06-13 ASSESSMENT — FIBROSIS 4 INDEX: FIB4 SCORE: 0.51

## 2023-06-13 NOTE — TELEPHONE ENCOUNTER
Have we ever prescribed this med? Yes.  If yes, what date? 4/10/23     Last OV: 5/15/23 HENRIETTA Harper    Next OV: no pending appt    DX: Gastroesophageal reflux disease without esophagitis [K21.9]    Medications: famotidine (PEPCID) 20 MG Tab

## 2023-06-13 NOTE — TELEPHONE ENCOUNTER
Chrissy from [employer] discussed with provider regarding Medicaid's denial for AVAPS machine.  Medicare states that the patient currently has a BiPAP machine that the patient supposedly received 2 years and 8 months ago.  They are denying the AVAPS machine due to the patient not using a BiPAP machine, but there is a question if the patient can received a BiPAP machine.  I have tried calling the patient numerous times, but unfortunately I keep on being transferred to different numbers and cannot reach the patient.  I have also reached out to the patient's sister, which provided me another number, but again that did not go through.  I did ask if the patient received a BiPAP machine, but the sister did not know.  Medicaid would also like to know why a BiPAP ST would not work in place of the AVAPS machine.    All documentation will be scanned into epic.

## 2023-06-13 NOTE — PROGRESS NOTES
Subjective     Lorene Haro is a 47 y.o. female who presents with Sore Throat (X yesterday with mild cough and chills. .  Denies fever. )            Pharyngitis   This is a new problem. The current episode started yesterday. The problem has been unchanged. There has been no fever. The pain is moderate. Associated symptoms include congestion, coughing (mild cough) and trouble swallowing. Pertinent negatives include no diarrhea, ear pain, headaches, hoarse voice, shortness of breath, stridor, swollen glands or vomiting. She has tried acetaminophen for the symptoms. The treatment provided mild relief.       Past Medical History:   Diagnosis Date    A-fib (HCC)     Asthma     Bipolar 2 disorder (HCC)     Chickenpox     Chronic obstructive pulmonary disease (HCC)     Hypertension     On home oxygen therapy     Other psychological stress     Schizophrenic disorder (HCC)     Yeast infection of the skin 04/01/2021       Past Surgical History:   Procedure Laterality Date    CHOLECYSTECTOMY      COLECTOMY      HYSTERECTOMY LAPAROSCOPY      KNEE ARTHROSCOPY Left            Family History   Problem Relation Age of Onset    No Known Problems Mother     Cancer Sister        Allergies:  Penicillin g and Amoxicillin        Medications, Allergies, and current problem list reviewed today in Epic      Review of Systems   Constitutional:  Positive for malaise/fatigue. Negative for chills and fever.   HENT:  Positive for congestion, sore throat and trouble swallowing. Negative for ear pain, hoarse voice and sinus pain.    Respiratory:  Positive for cough (mild cough). Negative for sputum production, shortness of breath, wheezing and stridor.    Cardiovascular:  Negative for chest pain and leg swelling.   Gastrointestinal:  Negative for diarrhea, nausea and vomiting.   Musculoskeletal:  Negative for myalgias.   Neurological:  Negative for headaches.     All other systems reviewed and are negative.            Objective     /64   " Pulse 62   Temp 36.6 °C (97.8 °F) (Temporal)   Resp 16   Ht 1.549 m (5' 1\")   Wt 116 kg (255 lb)   LMP  (LMP Unknown)   SpO2 95%   BMI 48.18 kg/m²      Physical Exam  Constitutional:       General: She is not in acute distress.     Appearance: She is not ill-appearing.   HENT:      Head: Atraumatic.      Mouth/Throat:      Mouth: Mucous membranes are moist.      Pharynx: Posterior oropharyngeal erythema present. No oropharyngeal exudate.   Eyes:      Conjunctiva/sclera: Conjunctivae normal.   Cardiovascular:      Rate and Rhythm: Normal rate and regular rhythm.   Pulmonary:      Effort: Pulmonary effort is normal. No respiratory distress.      Breath sounds: No wheezing, rhonchi or rales.   Lymphadenopathy:      Cervical: No cervical adenopathy.   Skin:     General: Skin is warm and dry.      Findings: No rash.   Neurological:      General: No focal deficit present.      Mental Status: She is alert and oriented to person, place, and time.   Psychiatric:         Mood and Affect: Mood normal.         Behavior: Behavior normal.         Thought Content: Thought content normal.         Judgment: Judgment normal.                           Assessment & Plan        1. Pharyngitis, unspecified etiology  POCT CEPHEID GROUP A STREP - PCR    dexamethasone (DECADRON) injection (check route below) 10 mg            - POCT CEPHEID GROUP A STREP - PCR- negative    Viral etiology discussed. Continue conservative treatment.    Differential diagnoses, Supportive care, and indications for immediate follow-up discussed with patient.   Pathogenesis of diagnosis discussed including typical length and natural progression.   Instructed to return to clinic or nearest emergency department for any change in condition, further concerns, or worsening of symptoms.        The patient demonstrated a good understanding and agreed with the treatment plan.    Zoe Padilla P.A.-C.                    "

## 2023-06-13 NOTE — LETTER
June 13, 2023         Patient: Lorene Haro   YOB: 1975   Date of Visit: 6/13/2023           To Whom it May Concern:    Lorene Haro was seen in my clinic on 6/13/2023 and can return to work tomorrow.    If you have any questions or concerns, please don't hesitate to call.        Sincerely,           Zoe Padilla P.A.-C.  Electronically Signed

## 2023-06-26 ENCOUNTER — NON-PROVIDER VISIT (OUTPATIENT)
Dept: CARDIOLOGY | Facility: MEDICAL CENTER | Age: 48
End: 2023-06-26
Payer: MEDICAID

## 2023-06-26 PROCEDURE — 93298 REM INTERROG DEV EVAL SCRMS: CPT | Performed by: INTERNAL MEDICINE

## 2023-06-27 NOTE — CARDIAC REMOTE MONITOR - SCAN
Device transmission reviewed. Device demonstrated appropriate function.       Electronically Signed by: Nader Barraza M.D.    6/27/2023  8:18 AM

## 2023-07-01 ENCOUNTER — APPOINTMENT (OUTPATIENT)
Dept: RADIOLOGY | Facility: MEDICAL CENTER | Age: 48
End: 2023-07-01
Attending: STUDENT IN AN ORGANIZED HEALTH CARE EDUCATION/TRAINING PROGRAM
Payer: MEDICAID

## 2023-07-01 ENCOUNTER — HOSPITAL ENCOUNTER (EMERGENCY)
Facility: MEDICAL CENTER | Age: 48
End: 2023-07-01
Attending: STUDENT IN AN ORGANIZED HEALTH CARE EDUCATION/TRAINING PROGRAM
Payer: MEDICAID

## 2023-07-01 VITALS
SYSTOLIC BLOOD PRESSURE: 116 MMHG | BODY MASS INDEX: 50.28 KG/M2 | TEMPERATURE: 97.5 F | HEIGHT: 61 IN | OXYGEN SATURATION: 93 % | HEART RATE: 71 BPM | RESPIRATION RATE: 17 BRPM | WEIGHT: 266.32 LBS | DIASTOLIC BLOOD PRESSURE: 56 MMHG

## 2023-07-01 DIAGNOSIS — R11.2 NAUSEA AND VOMITING, UNSPECIFIED VOMITING TYPE: ICD-10-CM

## 2023-07-01 DIAGNOSIS — M79.89 SWELLING OF BOTH LOWER EXTREMITIES: ICD-10-CM

## 2023-07-01 DIAGNOSIS — R10.13 EPIGASTRIC PAIN: ICD-10-CM

## 2023-07-01 LAB
ALBUMIN SERPL BCP-MCNC: 4.1 G/DL (ref 3.2–4.9)
ALBUMIN/GLOB SERPL: 1.4 G/DL
ALP SERPL-CCNC: 154 U/L (ref 30–99)
ALT SERPL-CCNC: 13 U/L (ref 2–50)
ANION GAP SERPL CALC-SCNC: 13 MMOL/L (ref 7–16)
APPEARANCE UR: CLEAR
AST SERPL-CCNC: 12 U/L (ref 12–45)
BACTERIA #/AREA URNS HPF: NEGATIVE /HPF
BASOPHILS # BLD AUTO: 0.2 % (ref 0–1.8)
BASOPHILS # BLD: 0.02 K/UL (ref 0–0.12)
BILIRUB SERPL-MCNC: 0.5 MG/DL (ref 0.1–1.5)
BILIRUB UR QL STRIP.AUTO: NEGATIVE
BUN SERPL-MCNC: 15 MG/DL (ref 8–22)
CALCIUM ALBUM COR SERPL-MCNC: 9.4 MG/DL (ref 8.5–10.5)
CALCIUM SERPL-MCNC: 9.5 MG/DL (ref 8.5–10.5)
CHLORIDE SERPL-SCNC: 103 MMOL/L (ref 96–112)
CO2 SERPL-SCNC: 25 MMOL/L (ref 20–33)
COLOR UR: YELLOW
CREAT SERPL-MCNC: 1.03 MG/DL (ref 0.5–1.4)
EOSINOPHIL # BLD AUTO: 0.1 K/UL (ref 0–0.51)
EOSINOPHIL NFR BLD: 0.8 % (ref 0–6.9)
EPI CELLS #/AREA URNS HPF: ABNORMAL /HPF
ERYTHROCYTE [DISTWIDTH] IN BLOOD BY AUTOMATED COUNT: 48.3 FL (ref 35.9–50)
GFR SERPLBLD CREATININE-BSD FMLA CKD-EPI: 66 ML/MIN/1.73 M 2
GLOBULIN SER CALC-MCNC: 3 G/DL (ref 1.9–3.5)
GLUCOSE SERPL-MCNC: 116 MG/DL (ref 65–99)
GLUCOSE UR STRIP.AUTO-MCNC: NEGATIVE MG/DL
HCT VFR BLD AUTO: 37.9 % (ref 37–47)
HGB BLD-MCNC: 11.9 G/DL (ref 12–16)
HYALINE CASTS #/AREA URNS LPF: ABNORMAL /LPF
IMM GRANULOCYTES # BLD AUTO: 0.04 K/UL (ref 0–0.11)
IMM GRANULOCYTES NFR BLD AUTO: 0.3 % (ref 0–0.9)
KETONES UR STRIP.AUTO-MCNC: NEGATIVE MG/DL
LEUKOCYTE ESTERASE UR QL STRIP.AUTO: ABNORMAL
LIPASE SERPL-CCNC: 31 U/L (ref 11–82)
LYMPHOCYTES # BLD AUTO: 2.14 K/UL (ref 1–4.8)
LYMPHOCYTES NFR BLD: 17.5 % (ref 22–41)
MCH RBC QN AUTO: 27.3 PG (ref 27–33)
MCHC RBC AUTO-ENTMCNC: 31.4 G/DL (ref 32.2–35.5)
MCV RBC AUTO: 86.9 FL (ref 81.4–97.8)
MICRO URNS: ABNORMAL
MONOCYTES # BLD AUTO: 0.58 K/UL (ref 0–0.85)
MONOCYTES NFR BLD AUTO: 4.7 % (ref 0–13.4)
NEUTROPHILS # BLD AUTO: 9.38 K/UL (ref 1.82–7.42)
NEUTROPHILS NFR BLD: 76.5 % (ref 44–72)
NITRITE UR QL STRIP.AUTO: NEGATIVE
NRBC # BLD AUTO: 0 K/UL
NRBC BLD-RTO: 0 /100 WBC (ref 0–0.2)
PH UR STRIP.AUTO: 5.5 [PH] (ref 5–8)
PLATELET # BLD AUTO: 242 K/UL (ref 164–446)
PMV BLD AUTO: 10.4 FL (ref 9–12.9)
POTASSIUM SERPL-SCNC: 3.7 MMOL/L (ref 3.6–5.5)
PROT SERPL-MCNC: 7.1 G/DL (ref 6–8.2)
PROT UR QL STRIP: NEGATIVE MG/DL
RBC # BLD AUTO: 4.36 M/UL (ref 4.2–5.4)
RBC # URNS HPF: ABNORMAL /HPF
RBC UR QL AUTO: NEGATIVE
SODIUM SERPL-SCNC: 141 MMOL/L (ref 135–145)
SP GR UR STRIP.AUTO: 1.02
UROBILINOGEN UR STRIP.AUTO-MCNC: 0.2 MG/DL
WBC # BLD AUTO: 12.3 K/UL (ref 4.8–10.8)
WBC #/AREA URNS HPF: ABNORMAL /HPF

## 2023-07-01 PROCEDURE — 700102 HCHG RX REV CODE 250 W/ 637 OVERRIDE(OP): Performed by: STUDENT IN AN ORGANIZED HEALTH CARE EDUCATION/TRAINING PROGRAM

## 2023-07-01 PROCEDURE — 96374 THER/PROPH/DIAG INJ IV PUSH: CPT

## 2023-07-01 PROCEDURE — 81001 URINALYSIS AUTO W/SCOPE: CPT

## 2023-07-01 PROCEDURE — 99285 EMERGENCY DEPT VISIT HI MDM: CPT

## 2023-07-01 PROCEDURE — C9113 INJ PANTOPRAZOLE SODIUM, VIA: HCPCS | Mod: UD | Performed by: STUDENT IN AN ORGANIZED HEALTH CARE EDUCATION/TRAINING PROGRAM

## 2023-07-01 PROCEDURE — 700117 HCHG RX CONTRAST REV CODE 255: Mod: UD | Performed by: STUDENT IN AN ORGANIZED HEALTH CARE EDUCATION/TRAINING PROGRAM

## 2023-07-01 PROCEDURE — 80053 COMPREHEN METABOLIC PANEL: CPT

## 2023-07-01 PROCEDURE — 74177 CT ABD & PELVIS W/CONTRAST: CPT

## 2023-07-01 PROCEDURE — 700111 HCHG RX REV CODE 636 W/ 250 OVERRIDE (IP): Mod: UD | Performed by: STUDENT IN AN ORGANIZED HEALTH CARE EDUCATION/TRAINING PROGRAM

## 2023-07-01 PROCEDURE — 96375 TX/PRO/DX INJ NEW DRUG ADDON: CPT

## 2023-07-01 PROCEDURE — 36415 COLL VENOUS BLD VENIPUNCTURE: CPT

## 2023-07-01 PROCEDURE — A9270 NON-COVERED ITEM OR SERVICE: HCPCS | Performed by: STUDENT IN AN ORGANIZED HEALTH CARE EDUCATION/TRAINING PROGRAM

## 2023-07-01 PROCEDURE — 83690 ASSAY OF LIPASE: CPT

## 2023-07-01 PROCEDURE — 85025 COMPLETE CBC W/AUTO DIFF WBC: CPT

## 2023-07-01 RX ORDER — ONDANSETRON 2 MG/ML
8 INJECTION INTRAMUSCULAR; INTRAVENOUS ONCE
Status: COMPLETED | OUTPATIENT
Start: 2023-07-01 | End: 2023-07-01

## 2023-07-01 RX ORDER — ONDANSETRON 4 MG/1
4 TABLET, ORALLY DISINTEGRATING ORAL EVERY 6 HOURS PRN
Qty: 10 TABLET | Refills: 0 | Status: ON HOLD | OUTPATIENT
Start: 2023-07-01 | End: 2023-08-01

## 2023-07-01 RX ORDER — FAMOTIDINE 20 MG/1
20 TABLET, FILM COATED ORAL 2 TIMES DAILY PRN
Qty: 30 TABLET | Refills: 0 | Status: SHIPPED | OUTPATIENT
Start: 2023-07-01 | End: 2023-10-12

## 2023-07-01 RX ORDER — PANTOPRAZOLE SODIUM 40 MG/10ML
40 INJECTION, POWDER, LYOPHILIZED, FOR SOLUTION INTRAVENOUS ONCE
Status: COMPLETED | OUTPATIENT
Start: 2023-07-01 | End: 2023-07-01

## 2023-07-01 RX ADMIN — IOHEXOL 100 ML: 350 INJECTION, SOLUTION INTRAVENOUS at 22:00

## 2023-07-01 RX ADMIN — PANTOPRAZOLE SODIUM 40 MG: 40 INJECTION, POWDER, FOR SOLUTION INTRAVENOUS at 22:35

## 2023-07-01 RX ADMIN — ONDANSETRON 8 MG: 2 INJECTION INTRAMUSCULAR; INTRAVENOUS at 21:48

## 2023-07-01 RX ADMIN — FAMOTIDINE 20 MG: 10 INJECTION, SOLUTION INTRAVENOUS at 21:50

## 2023-07-01 RX ADMIN — LIDOCAINE HYDROCHLORIDE 15 ML: 20 SOLUTION OROPHARYNGEAL at 22:33

## 2023-07-01 ASSESSMENT — FIBROSIS 4 INDEX: FIB4 SCORE: 0.51

## 2023-07-01 ASSESSMENT — PAIN DESCRIPTION - PAIN TYPE
TYPE: ACUTE PAIN
TYPE: ACUTE PAIN

## 2023-07-02 NOTE — ED NOTES
Pt discharged to home. Discharge paperwork provided. Education provided by ERP.  Pt was given follow up instructions and prescriptions  Pt verbalized understanding of all instructions for discharge. Patient  alert and oriented x 4, out of ER with all belongings assisted going out via wheelchair

## 2023-07-02 NOTE — ED NOTES
Taken patient from triage waiting room, via wheelchair, alert/ oriented x 4.Verified patient identification.  Assumed patient care. Placed on patient room. Changed clothes to hospital gown. Connected to cardiac monitor. Given the call light and instructed to call for any assistance needed/ or concerns. Bed on lowest position, side rails up, breaks locked. Will continue to monitor for any complications     Chief Complaint   Patient presents with    Leg Pain     X 3days ago, pt having bilateral leg pain, noticed swelling with some redness. 9/10 sharp pain. States numbness and tingling to LLE from shin down. Pt denies any recent injury. Takes ASA daily.     Abdominal Pain     X2 days ago, mid abd pain 3/10 intermittent pain. +n/v      Past Medical History:   Diagnosis Date    A-fib (HCC)     Asthma     Bipolar 2 disorder (HCC)     Chickenpox     Chronic obstructive pulmonary disease (HCC)     Hypertension     On home oxygen therapy     Other psychological stress     Schizophrenic disorder (HCC)     Yeast infection of the skin 04/01/2021

## 2023-07-02 NOTE — ED TRIAGE NOTES
"Chief Complaint   Patient presents with    Leg Pain     X 3days ago, pt having bilateral leg pain, noticed swelling with some redness. 9/10 sharp pain. States numbness and tingling to LLE from shin down. Pt denies any recent injury. Takes ASA daily.     Abdominal Pain     X2 days ago, mid abd pain 3/10 intermittent pain. +n/v        Pt used wc to triage. Pt A&Ox4, on room air. Pt able to stand up on scale for weight. Pt had x2 episodes of emesis in triage, given new emesis bag. Denies GI hx.    Pt BIB [facility], no interventions PTA.      Pt to lobby . Pt educated on alerting staff in changes to condition. Pt verbalized understanding. Protocol ordered.     /68   Pulse 69   Temp 36.6 °C (97.9 °F) (Temporal)   Resp 16   Ht 1.549 m (5' 1\")   Wt 121 kg (266 lb 5.1 oz)   LMP  (LMP Unknown)   SpO2 94%   BMI 50.32 kg/m²     "

## 2023-07-02 NOTE — ED PROVIDER NOTES
ED Provider Note    CHIEF COMPLAINT  Chief Complaint   Patient presents with    Leg Pain     X 3days ago, pt having bilateral leg pain, noticed swelling with some redness. 9/10 sharp pain. States numbness and tingling to LLE from shin down. Pt denies any recent injury. Takes ASA daily.     Abdominal Pain     X2 days ago, mid abd pain 3/10 intermittent pain. +n/v        EXTERNAL RECORDS REVIEWED  Outpatient Notes visit on 2023 for theology follow-up    HPI/ROS  LIMITATION TO HISTORY   Select: : None  OUTSIDE HISTORIAN(S):  EMS reports the patient was complaining of leg swelling    Lorene Haro is a 47 y.o. female who presents with bilateral leg swelling for 3 days.  Patient reports numbness, swelling, tingling to the bilateral lower extremities.  Patient reports this is a chronic issue but has been worsening over the past 3 days.  Patient reports that in the past it has been cellulitis.  Patient denies back pain or weakness.  Patient also complains of 2 days of intermittent middle abdominal pain moderate intensity with associated nausea and vomiting.  Patient denies fevers, chills, bodies, sweats.  Patient also endorses some mild diarrhea and a headache.    PAST MEDICAL HISTORY   has a past medical history of A-fib ([facility]), Asthma, Bipolar 2 disorder (HCC), Chickenpox, Chronic obstructive pulmonary disease (HCC), Hypertension, On home oxygen therapy, Other psychological stress, Schizophrenic disorder (HCC), and Yeast infection of the skin (2021).    SURGICAL HISTORY   has a past surgical history that includes hysterectomy laparoscopy; colectomy; cholecystectomy; and knee arthroscopy (Left).    FAMILY HISTORY  Family History   Problem Relation Age of Onset    No Known Problems Mother     Cancer Sister        SOCIAL HISTORY  Social History     Tobacco Use    Smoking status: Former     Packs/day: 0.25     Types: Cigarettes     Quit date: 10/12/2022     Years since quittin.7    Smokeless tobacco: Never  "  Vaping Use    Vaping Use: Never used   Substance and Sexual Activity    Alcohol use: Never    Drug use: Never    Sexual activity: Not Currently       CURRENT MEDICATIONS  Home Medications       Reviewed by Nadia Skaggs R.N. (Registered Nurse) on 07/01/23 at 1954  Med List Status: Partial     Medication Last Dose Status   ADVAIR DISKUS 100-50 MCG/ACT AEROSOL POWDER, BREATH ACTIVATED  Active   albuterol 108 (90 Base) MCG/ACT Aero Soln inhalation aerosol  Active   aripiprazole (ABILIFY) 30 MG tablet  Active   aspirin 81 MG EC tablet  Active   atorvastatin (LIPITOR) 20 MG Tab  Active   famotidine (PEPCID) 20 MG Tab  Active   fluticasone (FLONASE) 50 MCG/ACT nasal spray  Active   furosemide (LASIX) 20 MG Tab  Active   metoprolol SR (TOPROL XL) 25 MG TABLET SR 24 HR  Active   montelukast (SINGULAIR) 10 MG Tab  Active   potassium chloride (KLOR-CON) 20 MEQ Pack  Active   sennosides (SENOKOT) 8.6 MG Tab  Active   traZODone (DESYREL) 50 MG Tab  Active                    ALLERGIES  Allergies   Allergen Reactions    Penicillin G Rash and Itching     pt reports that she gets a rash all over her body and gets itchy    Amoxicillin Rash and Itching     Tolerates cephalosporins, pt reports that she gets a rash all over her body and gets itchy       PHYSICAL EXAM  VITAL SIGNS: /57   Pulse 67   Temp 36.3 °C (97.3 °F) (Temporal)   Resp 17   Ht 1.549 m (5' 1\")   Wt 121 kg (266 lb 5.1 oz)   LMP  (LMP Unknown)   SpO2 93%   BMI 50.32 kg/m²    Vitals and nursing note reviewed.   Constitutional:       Comments: Patient is lying in bed supine, pleasant, conversant, speaking in complete sentences   HENT:      Head: Normocephalic and atraumatic.   Eyes:      Extraocular Movements: Extraocular movements intact.      Conjunctiva/sclera: Conjunctivae normal.      Pupils: Pupils are equal, round, and reactive to light.   Cardiovascular:      Pulses: Normal pulses.      Comments: HR 60  Pulmonary:      Effort: Pulmonary effort is " normal. No respiratory distress.   Abdominal:      Comments: Abdomen is soft, Fuhs tenderness to palpation without rigidity  Musculoskeletal:         General: No lower extremity swelling, no erythema, fluctuance, crepitus, warmth, tenderness compartments. Normal range of motion.      Cervical back: Normal range of motion. No rigidity.   Skin:     General: Skin is warm and dry.      Capillary Refill: Capillary refill takes less than 2 seconds.   Neurological:      Mental Status: Alert.  Station light touch grossly intact in the bilateral lower extremities.      DIAGNOSTIC STUDIES / PROCEDURES      LABS  Mild leukocytosis    RADIOLOGY  I have independently interpreted the diagnostic imaging associated with this visit and am waiting the final reading from the radiologist.   My preliminary interpretation is as follows: No perforation  Radiologist interpretation: No appendicitis    COURSE & MEDICAL DECISION MAKING      INITIAL ASSESSMENT, COURSE AND PLAN  Care Narrative: CBC significant for leukocytosis and left shift, in the context of abdominal pain and nausea vomiting will conduct CT imaging to rule out acute abdominal abnormality such as small bowel, perforation, cholecystitis, diverticulitis, appendicitis.  Lipase negative, pancreatitis inconsistent with patient presentation at this time.  Urinalysis negative, UTI inconsistent with patient presentation at this time.  Famotidine, Zofran, Protonix for symptom control.  Disposition pending work-up.    Electronically signed by: Cirilo Looney M.D., 7/1/2023 9:54 PM    CT abdomen pelvis demonstrates no evidence of acute intra-abdominal process.  Patient received GI cocktail, Protonix, famotidine, Zofran with improvement, patient is now tolerating oral intake without difficulty.  GERD versus gastritis.    Repeat physical exam benign.  I doubt any serious emergency process at this time.  Patient and/or family, friends given strict return precautions for worsening  symptoms and care instructions. They have demonstrated understanding of discharge instructions through teach back mechanism. Advised PCP follow-up in 1-2 days.  Patient/family/friend expresses understanding and agrees to plan.    This dictation has been created using voice recognition software. I am continuously working with the software to minimize the number of voice recognition errors and I have made every attempt to manually correct the errors within my dictation. However errors  related to this voice recognition software may still exist and should be interpreted within the appropriate context.     Electronically signed by: Cirilo Looney M.D., 7/1/2023 11:22 PM      DISPOSITION AND DISCUSSIONS    Escalation of care considered, and ultimately not performed:acute inpatient care management, however at this time, the patient is most appropriate for outpatient management    Decision tools and prescription drugs considered including, but not limited to: Antibiotics not indicated at this time in the absence of bacterial infection .    FINAL DIAGNOSIS  1. Swelling of both lower extremities    2. Nausea and vomiting, unspecified vomiting type    3. Epigastric pain           Electronically signed by: Cirilo Looney M.D., 7/1/2023 9:48 PM

## 2023-07-07 ENCOUNTER — HOSPITAL ENCOUNTER (EMERGENCY)
Facility: MEDICAL CENTER | Age: 48
End: 2023-07-08
Attending: EMERGENCY MEDICINE
Payer: MEDICAID

## 2023-07-07 DIAGNOSIS — R11.2 NAUSEA AND VOMITING, UNSPECIFIED VOMITING TYPE: ICD-10-CM

## 2023-07-07 DIAGNOSIS — R10.9 ABDOMINAL PAIN, UNSPECIFIED ABDOMINAL LOCATION: ICD-10-CM

## 2023-07-07 LAB
ALBUMIN SERPL BCP-MCNC: 4 G/DL (ref 3.2–4.9)
ALBUMIN/GLOB SERPL: 1.3 G/DL
ALP SERPL-CCNC: 135 U/L (ref 30–99)
ALT SERPL-CCNC: 14 U/L (ref 2–50)
ANION GAP SERPL CALC-SCNC: 10 MMOL/L (ref 7–16)
AST SERPL-CCNC: 9 U/L (ref 12–45)
BASOPHILS # BLD AUTO: 0.3 % (ref 0–1.8)
BASOPHILS # BLD: 0.03 K/UL (ref 0–0.12)
BILIRUB SERPL-MCNC: 0.5 MG/DL (ref 0.1–1.5)
BUN SERPL-MCNC: 13 MG/DL (ref 8–22)
CALCIUM ALBUM COR SERPL-MCNC: 9.4 MG/DL (ref 8.5–10.5)
CALCIUM SERPL-MCNC: 9.4 MG/DL (ref 8.5–10.5)
CHLORIDE SERPL-SCNC: 105 MMOL/L (ref 96–112)
CO2 SERPL-SCNC: 28 MMOL/L (ref 20–33)
CREAT SERPL-MCNC: 1.1 MG/DL (ref 0.5–1.4)
EOSINOPHIL # BLD AUTO: 0.13 K/UL (ref 0–0.51)
EOSINOPHIL NFR BLD: 1.1 % (ref 0–6.9)
ERYTHROCYTE [DISTWIDTH] IN BLOOD BY AUTOMATED COUNT: 48.5 FL (ref 35.9–50)
GFR SERPLBLD CREATININE-BSD FMLA CKD-EPI: 61 ML/MIN/1.73 M 2
GLOBULIN SER CALC-MCNC: 3.1 G/DL (ref 1.9–3.5)
GLUCOSE SERPL-MCNC: 96 MG/DL (ref 65–99)
HCT VFR BLD AUTO: 37.2 % (ref 37–47)
HGB BLD-MCNC: 11.7 G/DL (ref 12–16)
IMM GRANULOCYTES # BLD AUTO: 0.04 K/UL (ref 0–0.11)
IMM GRANULOCYTES NFR BLD AUTO: 0.3 % (ref 0–0.9)
LIPASE SERPL-CCNC: 21 U/L (ref 11–82)
LYMPHOCYTES # BLD AUTO: 1.89 K/UL (ref 1–4.8)
LYMPHOCYTES NFR BLD: 16.5 % (ref 22–41)
MCH RBC QN AUTO: 27.5 PG (ref 27–33)
MCHC RBC AUTO-ENTMCNC: 31.5 G/DL (ref 32.2–35.5)
MCV RBC AUTO: 87.5 FL (ref 81.4–97.8)
MONOCYTES # BLD AUTO: 0.66 K/UL (ref 0–0.85)
MONOCYTES NFR BLD AUTO: 5.8 % (ref 0–13.4)
NEUTROPHILS # BLD AUTO: 8.72 K/UL (ref 1.82–7.42)
NEUTROPHILS NFR BLD: 76 % (ref 44–72)
NRBC # BLD AUTO: 0 K/UL
NRBC BLD-RTO: 0 /100 WBC (ref 0–0.2)
PLATELET # BLD AUTO: 262 K/UL (ref 164–446)
PMV BLD AUTO: 10.9 FL (ref 9–12.9)
POTASSIUM SERPL-SCNC: 4 MMOL/L (ref 3.6–5.5)
PROT SERPL-MCNC: 7.1 G/DL (ref 6–8.2)
RBC # BLD AUTO: 4.25 M/UL (ref 4.2–5.4)
SODIUM SERPL-SCNC: 143 MMOL/L (ref 135–145)
WBC # BLD AUTO: 11.5 K/UL (ref 4.8–10.8)

## 2023-07-07 PROCEDURE — 85025 COMPLETE CBC W/AUTO DIFF WBC: CPT

## 2023-07-07 PROCEDURE — 36415 COLL VENOUS BLD VENIPUNCTURE: CPT

## 2023-07-07 PROCEDURE — 80053 COMPREHEN METABOLIC PANEL: CPT

## 2023-07-07 PROCEDURE — 99284 EMERGENCY DEPT VISIT MOD MDM: CPT

## 2023-07-07 PROCEDURE — 83690 ASSAY OF LIPASE: CPT

## 2023-07-07 ASSESSMENT — FIBROSIS 4 INDEX: FIB4 SCORE: 0.67

## 2023-07-08 ENCOUNTER — APPOINTMENT (OUTPATIENT)
Dept: RADIOLOGY | Facility: MEDICAL CENTER | Age: 48
End: 2023-07-08
Attending: EMERGENCY MEDICINE
Payer: MEDICAID

## 2023-07-08 VITALS
RESPIRATION RATE: 18 BRPM | WEIGHT: 270.73 LBS | BODY MASS INDEX: 51.11 KG/M2 | HEIGHT: 61 IN | DIASTOLIC BLOOD PRESSURE: 58 MMHG | TEMPERATURE: 97.9 F | HEART RATE: 61 BPM | OXYGEN SATURATION: 99 % | SYSTOLIC BLOOD PRESSURE: 104 MMHG

## 2023-07-08 LAB
APPEARANCE UR: CLEAR
BACTERIA #/AREA URNS HPF: NEGATIVE /HPF
BILIRUB UR QL STRIP.AUTO: NEGATIVE
COLOR UR: YELLOW
EPI CELLS #/AREA URNS HPF: NORMAL /HPF
GLUCOSE UR STRIP.AUTO-MCNC: NEGATIVE MG/DL
HYALINE CASTS #/AREA URNS LPF: NORMAL /LPF
KETONES UR STRIP.AUTO-MCNC: NEGATIVE MG/DL
LEUKOCYTE ESTERASE UR QL STRIP.AUTO: ABNORMAL
MICRO URNS: ABNORMAL
NITRITE UR QL STRIP.AUTO: NEGATIVE
PH UR STRIP.AUTO: 6.5 [PH] (ref 5–8)
PROT UR QL STRIP: NEGATIVE MG/DL
RBC # URNS HPF: NORMAL /HPF
RBC UR QL AUTO: NEGATIVE
SP GR UR STRIP.AUTO: 1.01
UROBILINOGEN UR STRIP.AUTO-MCNC: 0.2 MG/DL
WBC #/AREA URNS HPF: NORMAL /HPF

## 2023-07-08 PROCEDURE — 81001 URINALYSIS AUTO W/SCOPE: CPT

## 2023-07-08 PROCEDURE — 74022 RADEX COMPL AQT ABD SERIES: CPT

## 2023-07-08 NOTE — ED PROVIDER NOTES
ED Provider Note    CHIEF COMPLAINT  Chief Complaint   Patient presents with    N/V     Pt states she has been vomiting x7 days; pt noticed blood in emesis today    Abdominal Pain     Lower abdominal pain x1 day       EXTERNAL RECORDS REVIEWED  Inpatient Notes   and Outpatient Notes      HPI/ROS  LIMITATION TO HISTORY   Select: : None  OUTSIDE HISTORIAN(S):      Lorene Haro is a 47 y.o. female who presents to the emergency department nausea vomiting and 1 episode of hematemesis.  Patient reported small amount of blood and 1 episode of emesis today.  She is unclear whether she coughed this up or vomited it up however she also reports that she has been having some epigastric abdominal pain.  Normal urination normal bowel movements no chest pain or shortness of breath.  Patient well-known to this ER for multiple recurrent visits for chest pain and shortness of breath.    PAST MEDICAL HISTORY   has a past medical history of A-fib (HCC), Asthma, Bipolar 2 disorder (HCC), Chickenpox, Chronic obstructive pulmonary disease (HCC), Hypertension, On home oxygen therapy, Other psychological stress, Schizophrenic disorder (HCC), and Yeast infection of the skin (2021).    SURGICAL HISTORY   has a past surgical history that includes hysterectomy laparoscopy; colectomy; cholecystectomy; and knee arthroscopy (Left).    FAMILY HISTORY  Family History   Problem Relation Age of Onset    No Known Problems Mother     Cancer Sister        SOCIAL HISTORY  Social History     Tobacco Use    Smoking status: Former     Packs/day: 0.25     Types: Cigarettes     Quit date: 10/12/2022     Years since quittin.7    Smokeless tobacco: Never   Vaping Use    Vaping Use: Never used   Substance and Sexual Activity    Alcohol use: Not Currently    Drug use: Never    Sexual activity: Not Currently       CURRENT MEDICATIONS  Home Medications       Reviewed by Josi Hollingsworth R.N. (Registered Nurse) on 23 at 7979  Med List Status: Not  "Addressed     Medication Last Dose Status   ADVAIR DISKUS 100-50 MCG/ACT AEROSOL POWDER, BREATH ACTIVATED  Active   albuterol 108 (90 Base) MCG/ACT Aero Soln inhalation aerosol  Active   aripiprazole (ABILIFY) 30 MG tablet  Active   aspirin 81 MG EC tablet  Active   atorvastatin (LIPITOR) 20 MG Tab  Active   famotidine (PEPCID) 20 MG Tab  Active   fluticasone (FLONASE) 50 MCG/ACT nasal spray  Active   furosemide (LASIX) 20 MG Tab  Active   metoprolol SR (TOPROL XL) 25 MG TABLET SR 24 HR  Active   montelukast (SINGULAIR) 10 MG Tab  Active   ondansetron (ZOFRAN ODT) 4 MG TABLET DISPERSIBLE  Active   potassium chloride (KLOR-CON) 20 MEQ Pack  Active   sennosides (SENOKOT) 8.6 MG Tab  Active   traZODone (DESYREL) 50 MG Tab  Active                    ALLERGIES  Allergies   Allergen Reactions    Penicillin G Rash and Itching     pt reports that she gets a rash all over her body and gets itchy    Amoxicillin Rash and Itching     Tolerates cephalosporins, pt reports that she gets a rash all over her body and gets itchy       PHYSICAL EXAM  VITAL SIGNS: /71   Pulse 64   Temp 35.8 °C (96.5 °F) (Temporal)   Resp 20   Ht 1.549 m (5' 1\")   Wt 123 kg (270 lb 11.6 oz)   LMP  (LMP Unknown)   SpO2 94%   BMI 51.15 kg/m²    Pulse ox interpretation: I interpret this pulse ox as normal.  Constitutional: Alert and oriented x 3, no acute distress  HEENT: Atraumatic normocephalic, pupils are equal round reactive to light extraocular movements are intact. The nares is clear, external ears are normal, mouth shows moist mucous membranes normal dentition for age  Neck: Supple, no JVD no tracheal deviation  Cardiovascular: Regular rate and rhythm no murmur rub or gallop 2+ pulses peripherally x4  Thorax & Lungs: No respiratory distress, no wheezes rales or rhonchi, No chest tenderness.   GI: Obese, soft nontender nondistended positive bowel sounds, no peritoneal signs  Skin: Warm dry no acute rash or lesion  Musculoskeletal: " Moving all extremities with full range and 5 of 5 strength no acute  deformity  Neurologic: Cranial nerves III through XII are grossly intact no sensory deficit no cerebellar dysfunction   Psychiatric: Appropriate affect for situation at this time          DIAGNOSTIC STUDIES / PROCEDURES  Results for orders placed or performed during the hospital encounter of 07/07/23   CBC with Differential   Result Value Ref Range    WBC 11.5 (H) 4.8 - 10.8 K/uL    RBC 4.25 4.20 - 5.40 M/uL    Hemoglobin 11.7 (L) 12.0 - 16.0 g/dL    Hematocrit 37.2 37.0 - 47.0 %    MCV 87.5 81.4 - 97.8 fL    MCH 27.5 27.0 - 33.0 pg    MCHC 31.5 (L) 32.2 - 35.5 g/dL    RDW 48.5 35.9 - 50.0 fL    Platelet Count 262 164 - 446 K/uL    MPV 10.9 9.0 - 12.9 fL    Neutrophils-Polys 76.00 (H) 44.00 - 72.00 %    Lymphocytes 16.50 (L) 22.00 - 41.00 %    Monocytes 5.80 0.00 - 13.40 %    Eosinophils 1.10 0.00 - 6.90 %    Basophils 0.30 0.00 - 1.80 %    Immature Granulocytes 0.30 0.00 - 0.90 %    Nucleated RBC 0.00 0.00 - 0.20 /100 WBC    Neutrophils (Absolute) 8.72 (H) 1.82 - 7.42 K/uL    Lymphs (Absolute) 1.89 1.00 - 4.80 K/uL    Monos (Absolute) 0.66 0.00 - 0.85 K/uL    Eos (Absolute) 0.13 0.00 - 0.51 K/uL    Baso (Absolute) 0.03 0.00 - 0.12 K/uL    Immature Granulocytes (abs) 0.04 0.00 - 0.11 K/uL    NRBC (Absolute) 0.00 K/uL   Complete Metabolic Panel   Result Value Ref Range    Sodium 143 135 - 145 mmol/L    Potassium 4.0 3.6 - 5.5 mmol/L    Chloride 105 96 - 112 mmol/L    Co2 28 20 - 33 mmol/L    Anion Gap 10.0 7.0 - 16.0    Glucose 96 65 - 99 mg/dL    Bun 13 8 - 22 mg/dL    Creatinine 1.10 0.50 - 1.40 mg/dL    Calcium 9.4 8.5 - 10.5 mg/dL    AST(SGOT) 9 (L) 12 - 45 U/L    ALT(SGPT) 14 2 - 50 U/L    Alkaline Phosphatase 135 (H) 30 - 99 U/L    Total Bilirubin 0.5 0.1 - 1.5 mg/dL    Albumin 4.0 3.2 - 4.9 g/dL    Total Protein 7.1 6.0 - 8.2 g/dL    Globulin 3.1 1.9 - 3.5 g/dL    A-G Ratio 1.3 g/dL   Lipase   Result Value Ref Range    Lipase 21 11 - 82 U/L    Urinalysis    Specimen: Urine   Result Value Ref Range    Color Yellow     Character Clear     Specific Gravity 1.009 <1.035    Ph 6.5 5.0 - 8.0    Glucose Negative Negative mg/dL    Ketones Negative Negative mg/dL    Protein Negative Negative mg/dL    Bilirubin Negative Negative    Urobilinogen, Urine 0.2 Negative    Nitrite Negative Negative    Leukocyte Esterase Trace (A) Negative    Occult Blood Negative Negative    Micro Urine Req Microscopic    CORRECTED CALCIUM   Result Value Ref Range    Correct Calcium 9.4 8.5 - 10.5 mg/dL   ESTIMATED GFR   Result Value Ref Range    GFR (CKD-EPI) 61 >60 mL/min/1.73 m 2   URINE MICROSCOPIC (W/UA)   Result Value Ref Range    WBC 2-5 /hpf    RBC 0-2 /hpf    Bacteria Negative None /hpf    Epithelial Cells Few /hpf    Hyaline Cast 0-2 /lpf         RADIOLOGY  DX-ABDOMEN COMPLETE WITH AP OR PA CXR   Final Result      1.  No acute cardiopulmonary abnormality.   2.  No dilated loops of bowel or free air.            COURSE & MEDICAL DECISION MAKING    ED Observation Status? No; Patient does not meet criteria for ED Observation.     INITIAL ASSESSMENT, COURSE AND PLAN  Care Narrative: 47-year-old female well-known to our facility multiple previous visits.  Her imaging is unremarkable at this time vital signs are unremarkable no further hematemesis or hemoptysis no other acute pain or other complaints at this time.  Given instructions to return for worsening pain further hematemesis or hemoptysis any other abnormal bleeding or concerns otherwise discharged in stable and improved condition.            ADDITIONAL PROBLEM LIST    DISPOSITION AND DISCUSSIONS      I have discussed management of the patient with the following physicians and JAY's:      Discussion of management with other QHP or appropriate source(s): None     Escalation of care considered, and ultimately not performed:acute inpatient care management, however at this time, the patient is most appropriate for outpatient  "management    Barriers to care at this time, including but not limited to: Patient lacks transportation .     Decision tools and prescription drugs considered including, but not limited to: .  /58   Pulse 61   Temp 36.6 °C (97.9 °F)   Resp 18   Ht 1.549 m (5' 1\")   Wt 123 kg (270 lb 11.6 oz)   LMP  (LMP Unknown)   SpO2 99%   BMI 51.15 kg/m²     Heriberto Sharp M.D.  [address] [place] [address] [id number] [phone]    In 1 week      [facility], Emergency Dept  [address] [facility] [address] [phone]    in 12-24 hours if symptoms persist, immediately If symptoms worsen, or if you develop any other symptoms or concerns      FINAL DIAGNOSIS  1. Nausea and vomiting, unspecified vomiting type Active   2. Abdominal pain, unspecified abdominal location Active           "

## 2023-07-08 NOTE — ED TRIAGE NOTES
Chief Complaint   Patient presents with    N/V     Pt states she has been vomiting x7 days; pt noticed blood in emesis today    Abdominal Pain     Lower abdominal pain x1 day     Pt ambulatory to triage with family for above complaint. Pt seen here on 7/1 and d/c after negative CT and full workup.     Pt is alert/oriented and follows commands. Pt speaking in full sentences and responds appropriately to questions. No acute distress noted in triage and respirations are even and unlabored.     Pt placed in lobby and educated on triage process. Pt and family encouraged to alert staff for any changes in condition.

## 2023-07-12 NOTE — ASSESSMENT & PLAN NOTE
Diet and lifestyle modification   Melida from [facility] is calling stating that the pre op note is incomplete. She states that they need a complete list of all allergies, and a clear statement that the patient is cleared for surgery. Melida from the [facility] can be reached at [phone], and the fax number is [phone].

## 2023-07-14 LAB
ALBUMIN SERPL BCP-MCNC: 4 G/DL (ref 3.2–4.9)
ALBUMIN/GLOB SERPL: 1.3 G/DL
ALP SERPL-CCNC: 148 U/L (ref 30–99)
ALT SERPL-CCNC: 18 U/L (ref 2–50)
ANION GAP SERPL CALC-SCNC: 10 MMOL/L (ref 7–16)
AST SERPL-CCNC: 12 U/L (ref 12–45)
BASOPHILS # BLD AUTO: 0.2 % (ref 0–1.8)
BASOPHILS # BLD: 0.02 K/UL (ref 0–0.12)
BILIRUB SERPL-MCNC: 0.6 MG/DL (ref 0.1–1.5)
BUN SERPL-MCNC: 17 MG/DL (ref 8–22)
CALCIUM ALBUM COR SERPL-MCNC: 9.6 MG/DL (ref 8.5–10.5)
CALCIUM SERPL-MCNC: 9.6 MG/DL (ref 8.5–10.5)
CHLORIDE SERPL-SCNC: 102 MMOL/L (ref 96–112)
CO2 SERPL-SCNC: 26 MMOL/L (ref 20–33)
CREAT SERPL-MCNC: 1.01 MG/DL (ref 0.5–1.4)
EOSINOPHIL # BLD AUTO: 0.15 K/UL (ref 0–0.51)
EOSINOPHIL NFR BLD: 1.3 % (ref 0–6.9)
ERYTHROCYTE [DISTWIDTH] IN BLOOD BY AUTOMATED COUNT: 48.6 FL (ref 35.9–50)
GFR SERPLBLD CREATININE-BSD FMLA CKD-EPI: 68 ML/MIN/1.73 M 2
GLOBULIN SER CALC-MCNC: 3 G/DL (ref 1.9–3.5)
GLUCOSE SERPL-MCNC: 120 MG/DL (ref 65–99)
HCT VFR BLD AUTO: 37.5 % (ref 37–47)
HGB BLD-MCNC: 11.7 G/DL (ref 12–16)
IMM GRANULOCYTES # BLD AUTO: 0.05 K/UL (ref 0–0.11)
IMM GRANULOCYTES NFR BLD AUTO: 0.4 % (ref 0–0.9)
LYMPHOCYTES # BLD AUTO: 1.97 K/UL (ref 1–4.8)
LYMPHOCYTES NFR BLD: 17 % (ref 22–41)
MCH RBC QN AUTO: 27.1 PG (ref 27–33)
MCHC RBC AUTO-ENTMCNC: 31.2 G/DL (ref 32.2–35.5)
MCV RBC AUTO: 86.8 FL (ref 81.4–97.8)
MONOCYTES # BLD AUTO: 0.59 K/UL (ref 0–0.85)
MONOCYTES NFR BLD AUTO: 5.1 % (ref 0–13.4)
NEUTROPHILS # BLD AUTO: 8.78 K/UL (ref 1.82–7.42)
NEUTROPHILS NFR BLD: 76 % (ref 44–72)
NRBC # BLD AUTO: 0 K/UL
NRBC BLD-RTO: 0 /100 WBC (ref 0–0.2)
PLATELET # BLD AUTO: 260 K/UL (ref 164–446)
PMV BLD AUTO: 11 FL (ref 9–12.9)
POTASSIUM SERPL-SCNC: 4 MMOL/L (ref 3.6–5.5)
PROT SERPL-MCNC: 7 G/DL (ref 6–8.2)
RBC # BLD AUTO: 4.32 M/UL (ref 4.2–5.4)
SODIUM SERPL-SCNC: 138 MMOL/L (ref 135–145)
TROPONIN T SERPL-MCNC: 27 NG/L (ref 6–19)
WBC # BLD AUTO: 11.6 K/UL (ref 4.8–10.8)

## 2023-07-14 PROCEDURE — 93005 ELECTROCARDIOGRAM TRACING: CPT | Performed by: STUDENT IN AN ORGANIZED HEALTH CARE EDUCATION/TRAINING PROGRAM

## 2023-07-14 PROCEDURE — 0241U HCHG SARS-COV-2 COVID-19 NFCT DS RESP RNA 4 TRGT MIC: CPT

## 2023-07-14 PROCEDURE — 99285 EMERGENCY DEPT VISIT HI MDM: CPT

## 2023-07-14 PROCEDURE — 80053 COMPREHEN METABOLIC PANEL: CPT

## 2023-07-14 PROCEDURE — 85025 COMPLETE CBC W/AUTO DIFF WBC: CPT

## 2023-07-14 PROCEDURE — 93005 ELECTROCARDIOGRAM TRACING: CPT

## 2023-07-14 PROCEDURE — 84484 ASSAY OF TROPONIN QUANT: CPT

## 2023-07-14 PROCEDURE — 36415 COLL VENOUS BLD VENIPUNCTURE: CPT

## 2023-07-14 ASSESSMENT — PAIN DESCRIPTION - DESCRIPTORS: DESCRIPTORS: STABBING

## 2023-07-14 ASSESSMENT — FIBROSIS 4 INDEX: FIB4 SCORE: 0.45

## 2023-07-15 ENCOUNTER — APPOINTMENT (OUTPATIENT)
Dept: RADIOLOGY | Facility: MEDICAL CENTER | Age: 48
End: 2023-07-15
Attending: STUDENT IN AN ORGANIZED HEALTH CARE EDUCATION/TRAINING PROGRAM
Payer: MEDICAID

## 2023-07-15 ENCOUNTER — HOSPITAL ENCOUNTER (EMERGENCY)
Facility: MEDICAL CENTER | Age: 48
End: 2023-07-15
Attending: STUDENT IN AN ORGANIZED HEALTH CARE EDUCATION/TRAINING PROGRAM
Payer: MEDICAID

## 2023-07-15 VITALS
OXYGEN SATURATION: 95 % | TEMPERATURE: 97.4 F | RESPIRATION RATE: 13 BRPM | HEIGHT: 61 IN | BODY MASS INDEX: 50.41 KG/M2 | HEART RATE: 71 BPM | SYSTOLIC BLOOD PRESSURE: 101 MMHG | DIASTOLIC BLOOD PRESSURE: 54 MMHG | WEIGHT: 267 LBS

## 2023-07-15 DIAGNOSIS — R07.9 CHEST PAIN, UNSPECIFIED TYPE: ICD-10-CM

## 2023-07-15 DIAGNOSIS — J40 BRONCHITIS: ICD-10-CM

## 2023-07-15 DIAGNOSIS — J06.9 UPPER RESPIRATORY TRACT INFECTION, UNSPECIFIED TYPE: ICD-10-CM

## 2023-07-15 LAB
EKG IMPRESSION: NORMAL
FLUAV RNA SPEC QL NAA+PROBE: NEGATIVE
FLUBV RNA SPEC QL NAA+PROBE: NEGATIVE
RSV RNA SPEC QL NAA+PROBE: NEGATIVE
SARS-COV-2 RNA RESP QL NAA+PROBE: NOTDETECTED
SPECIMEN SOURCE: NORMAL
TROPONIN T SERPL-MCNC: 25 NG/L (ref 6–19)

## 2023-07-15 PROCEDURE — 71045 X-RAY EXAM CHEST 1 VIEW: CPT

## 2023-07-15 PROCEDURE — C9803 HOPD COVID-19 SPEC COLLECT: HCPCS | Performed by: STUDENT IN AN ORGANIZED HEALTH CARE EDUCATION/TRAINING PROGRAM

## 2023-07-15 PROCEDURE — A9270 NON-COVERED ITEM OR SERVICE: HCPCS | Mod: UD | Performed by: STUDENT IN AN ORGANIZED HEALTH CARE EDUCATION/TRAINING PROGRAM

## 2023-07-15 PROCEDURE — 700102 HCHG RX REV CODE 250 W/ 637 OVERRIDE(OP): Mod: UD | Performed by: STUDENT IN AN ORGANIZED HEALTH CARE EDUCATION/TRAINING PROGRAM

## 2023-07-15 PROCEDURE — 84484 ASSAY OF TROPONIN QUANT: CPT

## 2023-07-15 RX ORDER — METHOCARBAMOL 500 MG/1
1000 TABLET, FILM COATED ORAL ONCE
Status: COMPLETED | OUTPATIENT
Start: 2023-07-15 | End: 2023-07-15

## 2023-07-15 RX ORDER — ACETAMINOPHEN 500 MG
1000 TABLET ORAL ONCE
Status: COMPLETED | OUTPATIENT
Start: 2023-07-15 | End: 2023-07-15

## 2023-07-15 RX ORDER — NAPROXEN 375 MG/1
375 TABLET ORAL 2 TIMES DAILY WITH MEALS
Qty: 14 TABLET | Refills: 0 | Status: SHIPPED | OUTPATIENT
Start: 2023-07-15 | End: 2023-07-22

## 2023-07-15 RX ORDER — METHOCARBAMOL 750 MG/1
1500 TABLET, FILM COATED ORAL 3 TIMES DAILY
Qty: 20 TABLET | Refills: 0 | Status: SHIPPED | OUTPATIENT
Start: 2023-07-15 | End: 2023-10-12

## 2023-07-15 RX ADMIN — METHOCARBAMOL 1000 MG: 500 TABLET ORAL at 02:08

## 2023-07-15 RX ADMIN — ACETAMINOPHEN 1000 MG: 500 TABLET, FILM COATED ORAL at 02:10

## 2023-07-15 ASSESSMENT — LIFESTYLE VARIABLES: DO YOU DRINK ALCOHOL: NO

## 2023-07-15 ASSESSMENT — PAIN DESCRIPTION - PAIN TYPE: TYPE: ACUTE PAIN

## 2023-07-15 NOTE — ED NOTES
Pt updated with discharge plan, pt called caretaker to come  pt. Pt repositioned into chair using 1 person assist. Pt comfortably with even chest rise and fall, reports no needs at this time, call light available and in reach.

## 2023-07-15 NOTE — ED PROVIDER NOTES
ED Provider Note    CHIEF COMPLAINT  Chief Complaint   Patient presents with    Chest Pain    Cough     Bloody sputum       EXTERNAL RECORDS REVIEWED  Outpatient Notes office visit on 2023 for pharyngitis    HPI/ROS  LIMITATION TO HISTORY   Select: : None  OUTSIDE HISTORIAN(S):  EMS reports that the patient received 4 mg of Zofran in route    Lorene Haro is a 47 y.o. female who presents with productive cough, chest pain.  Patient reports that the cough has some small amount of streaked blood in it.  Denies fever, chills, body aches, sweats.  Patient is reportedly somewhat warm however.  Patient denies shortness of breath.  Patient denies pleuritic chest pain.  Patient denies exertional chest pain.  Patient reports that the chest pain is burning and sharp, nonradiating.    PAST MEDICAL HISTORY   has a past medical history of A-fib (HCC), Asthma, Bipolar 2 disorder (HCC), Chickenpox, Chronic obstructive pulmonary disease (HCC), Hypertension, On home oxygen therapy, Other psychological stress, Schizophrenic disorder (HCC), and Yeast infection of the skin (2021).    SURGICAL HISTORY   has a past surgical history that includes hysterectomy laparoscopy; colectomy; cholecystectomy; and knee arthroscopy (Left).    FAMILY HISTORY  Family History   Problem Relation Age of Onset    No Known Problems Mother     Cancer Sister        SOCIAL HISTORY  Social History     Tobacco Use    Smoking status: Former     Packs/day: 0.25     Types: Cigarettes     Quit date: 10/12/2022     Years since quittin.7    Smokeless tobacco: Never   Vaping Use    Vaping Use: Never used   Substance and Sexual Activity    Alcohol use: Not Currently    Drug use: Never    Sexual activity: Not Currently       CURRENT MEDICATIONS  Home Medications       Reviewed by Jl Garcia R.N. (Registered Nurse) on 23 at 2225  Med List Status: Not Addressed     Medication Last Dose Status   ADVAIR DISKUS 100-50 MCG/ACT AEROSOL POWDER,  "BREATH ACTIVATED  Active   albuterol 108 (90 Base) MCG/ACT Aero Soln inhalation aerosol  Active   aripiprazole (ABILIFY) 30 MG tablet  Active   aspirin 81 MG EC tablet  Active   atorvastatin (LIPITOR) 20 MG Tab  Active   famotidine (PEPCID) 20 MG Tab  Active   fluticasone (FLONASE) 50 MCG/ACT nasal spray  Active   furosemide (LASIX) 20 MG Tab  Active   metoprolol SR (TOPROL XL) 25 MG TABLET SR 24 HR  Active   montelukast (SINGULAIR) 10 MG Tab  Active   ondansetron (ZOFRAN ODT) 4 MG TABLET DISPERSIBLE  Active   potassium chloride (KLOR-CON) 20 MEQ Pack  Active   sennosides (SENOKOT) 8.6 MG Tab  Active   traZODone (DESYREL) 50 MG Tab  Active                    ALLERGIES  Allergies   Allergen Reactions    Penicillin G Rash and Itching     pt reports that she gets a rash all over her body and gets itchy    Amoxicillin Rash and Itching     Tolerates cephalosporins, pt reports that she gets a rash all over her body and gets itchy       PHYSICAL EXAM  VITAL SIGNS: /54   Pulse 71   Temp 36.3 °C (97.4 °F) (Temporal)   Resp 13   Ht 1.549 m (5' 1\")   Wt 121 kg (267 lb)   LMP  (LMP Unknown)   SpO2 95%   BMI 50.45 kg/m²    Vitals and nursing note reviewed.   Constitutional:       Comments: Patient is lying in bed supine, pleasant, conversant, speaking in complete sentences   HENT:      Head: Normocephalic and atraumatic.   Eyes:      Extraocular Movements: Extraocular movements intact.      Conjunctiva/sclera: Conjunctivae normal.      Pupils: Pupils are equal, round, and reactive to light.   Cardiovascular:      Pulses: Normal pulses.      Comments: HR 63  Pulmonary:      Effort: Pulmonary effort is normal. No respiratory distress.   Musculoskeletal:      Reproducible substernal chest wall tenderness to palpation.     General: No lower extremity swelling. Normal range of motion.      Cervical back: Normal range of motion. No rigidity.   Skin:     General: Skin is warm and dry.      Capillary Refill: Capillary " refill takes less than 2 seconds.   Neurological:      Mental Status: Alert.       DIAGNOSTIC STUDIES / PROCEDURES  EKG  I have independently interpreted this EKG  No ischemia    LABS  Troponin 27 and 25    RADIOLOGY  I have independently interpreted the diagnostic imaging associated with this visit and am waiting the final reading from the radiologist.   My preliminary interpretation is as follows: No pneumonia, pneumothorax, pulmonary edema  Radiologist interpretation: No pneumonia, pneumothorax, pulmonary edema    COURSE & MEDICAL DECISION MAKING    INITIAL ASSESSMENT, COURSE AND PLAN  Care Narrative: Patient has mild leukocytosis, otherwise CMP demonstrates no evidence of acute kidney injury, acute electrolyte abnormality, acute liver failure, CBC demonstrates no evidence of acute anemia or leukocytosis.  Troponin x2 at her baseline, even somewhat lower, initially 27 and 25.  Patient is getting Tylenol and Robaxin for symptom control.  Vital signs within normal limits, sepsis inconsistent with patient presentation at this time.  Patient possibly suffering from upper respiratory infection or bronchitis.  PE inconsistent with patient presentation at this time, no tachypnea, tachycardia, lower extremity or masses.    Electronically signed by: Cirilo Looney M.D., 7/15/2023 2:13 AM    Patient reports symptoms improved significantly.  Patient denies chest pain at this time.  Patient likely suffering from bronchitis versus viral URI.  No evidence of acute cardiac process at this time.    Repeat physical exam benign.  I doubt any serious emergency process at this time.  Patient and/or family, friends given strict return precautions for worsening symptoms and care instructions. They have demonstrated understanding of discharge instructions through teach back mechanism. Advised PCP follow-up in 1-2 days.  Patient/family/friend expresses understanding and agrees to plan.    This dictation has been created using  voice recognition software. I am continuously working with the software to minimize the number of voice recognition errors and I have made every attempt to manually correct the errors within my dictation. However errors  related to this voice recognition software may still exist and should be interpreted within the appropriate context.     Electronically signed by: Cirilo Looney M.D., 7/15/2023 5:02 AM      DISPOSITION AND DISCUSSIONS    Escalation of care considered, and ultimately not performed:acute inpatient care management, however at this time, the patient is most appropriate for outpatient management    Decision tools and prescription drugs considered including, but not limited to: HEART Score 3 .    FINAL DIAGNOSIS  1. Bronchitis    2. Upper respiratory tract infection, unspecified type    3. Chest pain, unspecified type           Electronically signed by: Cirilo Looney M.D., 7/15/2023 2:08 AM

## 2023-07-15 NOTE — ED NOTES
Pt medicated per MAR. Pt repositioned. Pt resting with even chest rise and fall, reports no needs at this time, call light available and in reach.

## 2023-07-15 NOTE — ED NOTES
PT oxygen declining to 88%, pt reports she is usually on 2L o2 at home during night time. 2L o2 applied and pt is sating >90%.

## 2023-07-15 NOTE — ED TRIAGE NOTES
".  Chief Complaint   Patient presents with    Chest Pain    Cough     Bloody sputum       47 yr patient BIB [facility] to triage for above complaint. Per EMS they were called to patients work for same symptoms but patient refused transport and 30 minutes ago patient called EMS because pain was not getting better. EMS gave 4 mg of Zofran en route IV.     Pt placed in lobby. Pt educated on triage process. Pt encouraged to alert staff for any changes.     Patient and staff wearing appropriate PPE    /77   Pulse 70   Temp 36.6 °C (97.9 °F) (Temporal)   Resp 16   Ht 1.549 m (5' 1\")   Wt 121 kg (267 lb)   LMP  (LMP Unknown)   SpO2 94%   BMI 50.45 kg/m²     "

## 2023-07-15 NOTE — ED NOTES
Pt repositioned in bed and provided additional blankets. Pt resting comfortably with even chest rise and fall, reports no needs at this time, call light available and in reach.

## 2023-07-15 NOTE — ED NOTES
Patient given discharge instructions and verbalizes understanding. PIV removed. PT escorted in wheelchair to caretakers car and transferred into car using 1 person assist.

## 2023-07-15 NOTE — ED NOTES
Pt brought back in wheelchair. 1 person assist to bathroom. Pt is now sitting up in bed on monitor with even and unlabored breaths, in no apparent distress at this time. Chart up for ERP.   poor balance

## 2023-07-21 ENCOUNTER — APPOINTMENT (OUTPATIENT)
Dept: RADIOLOGY | Facility: MEDICAL CENTER | Age: 48
End: 2023-07-21
Attending: EMERGENCY MEDICINE
Payer: MEDICAID

## 2023-07-21 ENCOUNTER — HOSPITAL ENCOUNTER (EMERGENCY)
Facility: MEDICAL CENTER | Age: 48
End: 2023-07-21
Attending: EMERGENCY MEDICINE
Payer: MEDICAID

## 2023-07-21 VITALS
HEART RATE: 68 BPM | WEIGHT: 266 LBS | DIASTOLIC BLOOD PRESSURE: 65 MMHG | BODY MASS INDEX: 50.22 KG/M2 | RESPIRATION RATE: 10 BRPM | TEMPERATURE: 98 F | HEIGHT: 61 IN | OXYGEN SATURATION: 92 % | SYSTOLIC BLOOD PRESSURE: 145 MMHG

## 2023-07-21 DIAGNOSIS — J45.20 MILD INTERMITTENT REACTIVE AIRWAY DISEASE WITHOUT COMPLICATION: ICD-10-CM

## 2023-07-21 DIAGNOSIS — J40 BRONCHITIS: ICD-10-CM

## 2023-07-21 LAB
ALBUMIN SERPL BCP-MCNC: 4 G/DL (ref 3.2–4.9)
ALBUMIN/GLOB SERPL: 1.6 G/DL
ALP SERPL-CCNC: 133 U/L (ref 30–99)
ALT SERPL-CCNC: 14 U/L (ref 2–50)
ANION GAP SERPL CALC-SCNC: 8 MMOL/L (ref 7–16)
APPEARANCE UR: CLEAR
AST SERPL-CCNC: 10 U/L (ref 12–45)
BASOPHILS # BLD AUTO: 0.2 % (ref 0–1.8)
BASOPHILS # BLD: 0.03 K/UL (ref 0–0.12)
BILIRUB SERPL-MCNC: 0.4 MG/DL (ref 0.1–1.5)
BILIRUB UR QL STRIP.AUTO: NEGATIVE
BUN SERPL-MCNC: 16 MG/DL (ref 8–22)
CALCIUM ALBUM COR SERPL-MCNC: 8.9 MG/DL (ref 8.5–10.5)
CALCIUM SERPL-MCNC: 8.9 MG/DL (ref 8.5–10.5)
CHLORIDE SERPL-SCNC: 105 MMOL/L (ref 96–112)
CO2 SERPL-SCNC: 28 MMOL/L (ref 20–33)
COLOR UR: YELLOW
CREAT SERPL-MCNC: 0.95 MG/DL (ref 0.5–1.4)
EKG IMPRESSION: NORMAL
EOSINOPHIL # BLD AUTO: 0.14 K/UL (ref 0–0.51)
EOSINOPHIL NFR BLD: 1.2 % (ref 0–6.9)
ERYTHROCYTE [DISTWIDTH] IN BLOOD BY AUTOMATED COUNT: 49.2 FL (ref 35.9–50)
GFR SERPLBLD CREATININE-BSD FMLA CKD-EPI: 73 ML/MIN/1.73 M 2
GLOBULIN SER CALC-MCNC: 2.5 G/DL (ref 1.9–3.5)
GLUCOSE SERPL-MCNC: 106 MG/DL (ref 65–99)
GLUCOSE UR STRIP.AUTO-MCNC: NEGATIVE MG/DL
HCT VFR BLD AUTO: 34.2 % (ref 37–47)
HGB BLD-MCNC: 10.6 G/DL (ref 12–16)
IMM GRANULOCYTES # BLD AUTO: 0.04 K/UL (ref 0–0.11)
IMM GRANULOCYTES NFR BLD AUTO: 0.3 % (ref 0–0.9)
KETONES UR STRIP.AUTO-MCNC: NEGATIVE MG/DL
LEUKOCYTE ESTERASE UR QL STRIP.AUTO: NEGATIVE
LYMPHOCYTES # BLD AUTO: 2 K/UL (ref 1–4.8)
LYMPHOCYTES NFR BLD: 16.6 % (ref 22–41)
MCH RBC QN AUTO: 27.1 PG (ref 27–33)
MCHC RBC AUTO-ENTMCNC: 31 G/DL (ref 32.2–35.5)
MCV RBC AUTO: 87.5 FL (ref 81.4–97.8)
MICRO URNS: NORMAL
MONOCYTES # BLD AUTO: 0.77 K/UL (ref 0–0.85)
MONOCYTES NFR BLD AUTO: 6.4 % (ref 0–13.4)
NEUTROPHILS # BLD AUTO: 9.05 K/UL (ref 1.82–7.42)
NEUTROPHILS NFR BLD: 75.3 % (ref 44–72)
NITRITE UR QL STRIP.AUTO: NEGATIVE
NRBC # BLD AUTO: 0 K/UL
NRBC BLD-RTO: 0 /100 WBC (ref 0–0.2)
PH UR STRIP.AUTO: 7 [PH] (ref 5–8)
PLATELET # BLD AUTO: 243 K/UL (ref 164–446)
PMV BLD AUTO: 11.5 FL (ref 9–12.9)
POTASSIUM SERPL-SCNC: 4.2 MMOL/L (ref 3.6–5.5)
PROT SERPL-MCNC: 6.5 G/DL (ref 6–8.2)
PROT UR QL STRIP: NEGATIVE MG/DL
RBC # BLD AUTO: 3.91 M/UL (ref 4.2–5.4)
RBC UR QL AUTO: NEGATIVE
SODIUM SERPL-SCNC: 141 MMOL/L (ref 135–145)
SP GR UR STRIP.AUTO: 1.01
UROBILINOGEN UR STRIP.AUTO-MCNC: 0.2 MG/DL
WBC # BLD AUTO: 12 K/UL (ref 4.8–10.8)

## 2023-07-21 PROCEDURE — 94640 AIRWAY INHALATION TREATMENT: CPT

## 2023-07-21 PROCEDURE — 80053 COMPREHEN METABOLIC PANEL: CPT

## 2023-07-21 PROCEDURE — 36415 COLL VENOUS BLD VENIPUNCTURE: CPT

## 2023-07-21 PROCEDURE — 700105 HCHG RX REV CODE 258: Mod: UD | Performed by: EMERGENCY MEDICINE

## 2023-07-21 PROCEDURE — 71045 X-RAY EXAM CHEST 1 VIEW: CPT

## 2023-07-21 PROCEDURE — 99284 EMERGENCY DEPT VISIT MOD MDM: CPT

## 2023-07-21 PROCEDURE — 85025 COMPLETE CBC W/AUTO DIFF WBC: CPT

## 2023-07-21 PROCEDURE — 700101 HCHG RX REV CODE 250: Mod: UD | Performed by: EMERGENCY MEDICINE

## 2023-07-21 PROCEDURE — 93005 ELECTROCARDIOGRAM TRACING: CPT | Performed by: EMERGENCY MEDICINE

## 2023-07-21 PROCEDURE — 93005 ELECTROCARDIOGRAM TRACING: CPT

## 2023-07-21 PROCEDURE — 81003 URINALYSIS AUTO W/O SCOPE: CPT

## 2023-07-21 RX ORDER — SODIUM CHLORIDE 9 MG/ML
1000 INJECTION, SOLUTION INTRAVENOUS ONCE
Status: COMPLETED | OUTPATIENT
Start: 2023-07-21 | End: 2023-07-21

## 2023-07-21 RX ORDER — IPRATROPIUM BROMIDE AND ALBUTEROL SULFATE 2.5; .5 MG/3ML; MG/3ML
3 SOLUTION RESPIRATORY (INHALATION) ONCE
Status: COMPLETED | OUTPATIENT
Start: 2023-07-21 | End: 2023-07-21

## 2023-07-21 RX ADMIN — IPRATROPIUM BROMIDE AND ALBUTEROL SULFATE 3 ML: 2.5; .5 SOLUTION RESPIRATORY (INHALATION) at 17:40

## 2023-07-21 RX ADMIN — SODIUM CHLORIDE 1000 ML: 9 INJECTION, SOLUTION INTRAVENOUS at 16:53

## 2023-07-21 ASSESSMENT — FIBROSIS 4 INDEX: FIB4 SCORE: 0.53

## 2023-07-21 NOTE — ED TRIAGE NOTES
"Chief Complaint   Patient presents with    Weakness     Pt bib EMS from [facility] for weakness and worsening dizziness and productive cough. Pt states that she experiences near syncope upon standing, EMS reports + orthostatics on scene. Pt also endorses SOB upon exertion and lying flat. Pt was on lasix recently and apixiban for A-fib but has been taken off of them. Pt currently has an implanted monitor for her A-fib. Pt denies N/V/D or fever. Pt recently finished a course of clindamycin for her leg cellulitis. FSBG 123.   Pt received 500mL of IV fluids from EMS.     /58   Pulse 65   Temp 36.8 °C (98.2 °F) (Temporal)   Resp (!) 10   Ht 1.549 m (5' 1\")   Wt 121 kg (266 lb)   LMP  (LMP Unknown)   SpO2 95%   BMI 50.26 kg/m²     "

## 2023-07-21 NOTE — ED PROVIDER NOTES
ED Provider Note    CHIEF COMPLAINT  Chief Complaint   Patient presents with    Weakness     Pt bib EMS from [facility] for weakness and worsening dizziness and productive cough. Pt states that she experiences near syncope upon standing, EMS reports + orthostatics on scene. Pt also endorses SOB upon exertion and lying flat. Pt was on lasix recently and apixiban for A-fib but has been taken off of them. Pt currently has an implanted monitor for her A-fib. Pt denies N/V/D or fever. Pt recently finished a course of clindamycin for her leg cellulitis. FSBG 123.       EXTERNAL RECORDS REVIEWED  Reviewed extensive recent ER visits hospitalization records    HPI/ROS  LIMITATION TO HISTORY   Patient is a poor historian  OUTSIDE HISTORIAN(S):  EMS provided additional history    Lorene Haro is a 47 y.o. female who presents for evaluation of generalized weakness mild cough.  The patient is a complex and extensive medical history including asthma as well as atrial fibrillation.  She specifically denies chest pain or strokelike symptoms.  No reported high fevers hemoptysis or leg swelling.  No report of high fever or chills.  She just finished a course of clindamycin for mild left leg cellulitis.  Paramedics noted no vital sign abnormalities such as hypoxia high fever or tachycardia.  She denies any abdominal pain nausea vomiting or diarrhea    PAST MEDICAL HISTORY   has a past medical history of A-fib (HCC), Asthma, Bipolar 2 disorder (HCC), Chickenpox, Chronic obstructive pulmonary disease (HCC), Hypertension, On home oxygen therapy, Other psychological stress, Schizophrenic disorder (HCC), and Yeast infection of the skin (04/01/2021).    SURGICAL HISTORY   has a past surgical history that includes hysterectomy laparoscopy; colectomy; cholecystectomy; and knee arthroscopy (Left).    FAMILY HISTORY  Family History   Problem Relation Age of Onset    No Known Problems Mother     Cancer Sister        SOCIAL HISTORY  Social  "History     Tobacco Use    Smoking status: Former     Packs/day: 0.25     Types: Cigarettes     Quit date: 10/12/2022     Years since quittin.7    Smokeless tobacco: Never   Vaping Use    Vaping Use: Never used   Substance and Sexual Activity    Alcohol use: Not Currently    Drug use: Never    Sexual activity: Not Currently     No IV drug use  CURRENT MEDICATIONS  Home Medications       Reviewed by Juanita Lima R.N. (Registered Nurse) on 23 at 1619  Med List Status: Not Addressed     Medication Last Dose Status   ADVAIR DISKUS 100-50 MCG/ACT AEROSOL POWDER, BREATH ACTIVATED  Active   albuterol 108 (90 Base) MCG/ACT Aero Soln inhalation aerosol  Active   aripiprazole (ABILIFY) 30 MG tablet  Active   aspirin 81 MG EC tablet  Active   atorvastatin (LIPITOR) 20 MG Tab  Active   famotidine (PEPCID) 20 MG Tab  Active   fluticasone (FLONASE) 50 MCG/ACT nasal spray  Active   furosemide (LASIX) 20 MG Tab  Active   methocarbamol (ROBAXIN) 750 MG Tab  Active   metoprolol SR (TOPROL XL) 25 MG TABLET SR 24 HR  Active   montelukast (SINGULAIR) 10 MG Tab  Active   naproxen (NAPROSYN) 375 MG Tab  Active   ondansetron (ZOFRAN ODT) 4 MG TABLET DISPERSIBLE  Active   potassium chloride (KLOR-CON) 20 MEQ Pack  Active   sennosides (SENOKOT) 8.6 MG Tab  Active   traZODone (DESYREL) 50 MG Tab  Active                    ALLERGIES  Allergies   Allergen Reactions    Penicillin G Rash and Itching     pt reports that she gets a rash all over her body and gets itchy    Amoxicillin Rash and Itching     Tolerates cephalosporins, pt reports that she gets a rash all over her body and gets itchy       PHYSICAL EXAM  VITAL SIGNS: /58   Pulse 65   Temp 36.8 °C (98.2 °F) (Temporal)   Resp (!) 10   Ht 1.549 m (5' 1\")   Wt 121 kg (266 lb)   LMP  (LMP Unknown)   SpO2 95%   BMI 50.26 kg/m²    Pulse ox interpretation: I interpret this pulse ox as normal.  Constitutional: Alert and oriented x 3, no acute distress  HEENT: " Atraumatic normocephalic, pupils are equal round reactive to light extraocular movements are intact. The nares is clear, external ears are normal, mouth shows moist mucous membranes normal dentition for age  Neck: Supple, no JVD no tracheal deviation  Cardiovascular: Regular rate and rhythm no murmur rub or gallop 2+ pulses peripherally x4  Thorax & Lungs: Mild scant wheezing no increased work of breathing no rhonchi or rales  GI: Soft nontender nondistended positive bowel sounds, no peritoneal signs  Skin: Warm dry no acute rash or lesion normal superficial redness in the bilateral legs consistent with chronic venous stasis  Musculoskeletal: Moving all extremities with full range and 5 of 5 strength no acute  deformity  Neurologic: Cranial nerves III through XII are grossly intact no sensory deficit no cerebellar dysfunction   Psychiatric: Appropriate affect for situation at this time          DIAGNOSTIC STUDIES / PROCEDURES      LABS  Results for orders placed or performed during the hospital encounter of 07/21/23   CBC with Differential   Result Value Ref Range    WBC 12.0 (H) 4.8 - 10.8 K/uL    RBC 3.91 (L) 4.20 - 5.40 M/uL    Hemoglobin 10.6 (L) 12.0 - 16.0 g/dL    Hematocrit 34.2 (L) 37.0 - 47.0 %    MCV 87.5 81.4 - 97.8 fL    MCH 27.1 27.0 - 33.0 pg    MCHC 31.0 (L) 32.2 - 35.5 g/dL    RDW 49.2 35.9 - 50.0 fL    Platelet Count 243 164 - 446 K/uL    MPV 11.5 9.0 - 12.9 fL    Neutrophils-Polys 75.30 (H) 44.00 - 72.00 %    Lymphocytes 16.60 (L) 22.00 - 41.00 %    Monocytes 6.40 0.00 - 13.40 %    Eosinophils 1.20 0.00 - 6.90 %    Basophils 0.20 0.00 - 1.80 %    Immature Granulocytes 0.30 0.00 - 0.90 %    Nucleated RBC 0.00 0.00 - 0.20 /100 WBC    Neutrophils (Absolute) 9.05 (H) 1.82 - 7.42 K/uL    Lymphs (Absolute) 2.00 1.00 - 4.80 K/uL    Monos (Absolute) 0.77 0.00 - 0.85 K/uL    Eos (Absolute) 0.14 0.00 - 0.51 K/uL    Baso (Absolute) 0.03 0.00 - 0.12 K/uL    Immature Granulocytes (abs) 0.04 0.00 - 0.11 K/uL     NRBC (Absolute) 0.00 K/uL   Comp Metabolic Panel   Result Value Ref Range    Sodium 141 135 - 145 mmol/L    Potassium 4.2 3.6 - 5.5 mmol/L    Chloride 105 96 - 112 mmol/L    Co2 28 20 - 33 mmol/L    Anion Gap 8.0 7.0 - 16.0    Glucose 106 (H) 65 - 99 mg/dL    Bun 16 8 - 22 mg/dL    Creatinine 0.95 0.50 - 1.40 mg/dL    Calcium 8.9 8.5 - 10.5 mg/dL    Correct Calcium 8.9 8.5 - 10.5 mg/dL    AST(SGOT) 10 (L) 12 - 45 U/L    ALT(SGPT) 14 2 - 50 U/L    Alkaline Phosphatase 133 (H) 30 - 99 U/L    Total Bilirubin 0.4 0.1 - 1.5 mg/dL    Albumin 4.0 3.2 - 4.9 g/dL    Total Protein 6.5 6.0 - 8.2 g/dL    Globulin 2.5 1.9 - 3.5 g/dL    A-G Ratio 1.6 g/dL   ESTIMATED GFR   Result Value Ref Range    GFR (CKD-EPI) 73 >60 mL/min/1.73 m 2   EKG (NOW)   Result Value Ref Range    Report       [facility]    Test Date:  2023  Pt Name:    ADELINA MILLAN                Department: ER  MRN:        [id number]                      Room:       [facility]  Gender:     Female                       Technician: [id number]  :        1975                   Requested By:ER TRIAGE PROTOCOL  Order #:    [id number]                    Reading MD:    Measurements  Intervals                                Axis  Rate:       67                           P:          63  LA:         134                          QRS:        43  QRSD:       106                          T:          9  QT:         395  QTc:        417    Interpretive Statements  Sinus rhythm  Low voltage, precordial leads  RSR' in V1 or V2, probably normal variant  Compared to ECG 2023 22:27:36  Low QRS voltage now present  RSR' in V1 or V2 now present  ST (T wave) deviation no longer present        EKG interpretation by me sinus rhythm twelve-lead EKG no acute ST segment elevation or depression no pathological T wave inversion no suggestion of arrhythmia heart block or ischemia  RADIOLOGY  I have independently interpreted the diagnostic imaging associated  with this visit and am waiting the final reading from the radiologist.   My preliminary interpretation is as follows: No acute heart failure pneumonia  Radiologist interpretation:   DX-CHEST-PORTABLE (1 VIEW)   Final Result      Stable chest without acute/new abnormality.          COURSE & MEDICAL DECISION MAKING    ED Observation Status? Yes; I am placing the patient in to an observation status due to a diagnostic uncertainty as well as therapeutic intensity. Patient placed in observation status at 6:59 PM, 7/21/2023.     Observation plan is as follows: Perform extensive blood testing monitor on continuous pulse oximetry and cardiac monitoring.  Administer bronchodilators reassess patient    Upon Reevaluation, the patient's condition has: Improved; and will be discharged.    Patient discharged from ED Observation status at 1900 (Time) 7/21 (Date).     INITIAL ASSESSMENT, COURSE AND PLAN  Care Narrative:     This is a very pleasant 47-year-old female lives in a group home who presents here with some mild cough and shortness of breath.  Her vital signs are reassuring.  Specifically no hypotension hypertension tachycardia or hypoxia.  She had no fever chest x-ray is clear.  She had extensive work-up and laboratory studies including CBC demonstrated mild leukocytosis stable anemia with a hemoglobin of 10.6 metabolic panel is normal urinalysis is clear and negative for infection.  She was given a DuoNeb breathing treatment and observed for around 2 hours she feels improved she was also given some IV fluids.  We performed serial exams on her and she had no evidence of peritonitis or increased work of breathing.  I did not see or detect any acute metabolic or surgical process or infectious process that would merit admission.  I think she can be discharged back to her group home with return precautions    HYDRATION: Based on the patient's presentation of Dehydration the patient was given IV fluids. IV Hydration was used  because oral hydration was not adequate alone. Upon recheck following hydration, the patient was improved.      ADDITIONAL PROBLEM LIST    DISPOSITION AND DISCUSSIONS  I have discussed management of the patient with the following physicians and JAY's: None none    Discussion of management with other QHP or appropriate source(s): None    Escalation of care considered, and ultimately not performed:acute inpatient care management, however at this time, the patient is most appropriate for outpatient management    Barriers to care at this time, including but not limited to: None none none none    Decision tools and prescription drugs considered including, but not limited to: None    FINAL DIAGNOSIS  Bronchitis  Reactive airway disease       Electronically signed by: Juan Day M.D., 7/21/2023 4:27 PM

## 2023-07-22 NOTE — ED NOTES
Bedside report from Juanita GREENE. Discharge teaching with paperwork provided to pt. Pt verbalized understanding of teaching and all questions answered. Pt is A&Ox4 with stable vital signs, stable physical assessment, and PIV removed with catheter intact upon discharge. Pt ambulatory out of ED with steady gait using personal walker with all personal belongings.

## 2023-07-24 ENCOUNTER — HOSPITAL ENCOUNTER (EMERGENCY)
Facility: MEDICAL CENTER | Age: 48
End: 2023-07-25
Attending: EMERGENCY MEDICINE
Payer: MEDICAID

## 2023-07-24 ENCOUNTER — APPOINTMENT (OUTPATIENT)
Dept: RADIOLOGY | Facility: MEDICAL CENTER | Age: 48
End: 2023-07-24
Attending: EMERGENCY MEDICINE
Payer: MEDICAID

## 2023-07-24 DIAGNOSIS — J44.1 ACUTE EXACERBATION OF CHRONIC OBSTRUCTIVE PULMONARY DISEASE (COPD) (HCC): ICD-10-CM

## 2023-07-24 DIAGNOSIS — J18.9 ATYPICAL PNEUMONIA: ICD-10-CM

## 2023-07-24 LAB
ALBUMIN SERPL BCP-MCNC: 4.1 G/DL (ref 3.2–4.9)
ALBUMIN/GLOB SERPL: 1.4 G/DL
ALP SERPL-CCNC: 142 U/L (ref 30–99)
ALT SERPL-CCNC: 19 U/L (ref 2–50)
ANION GAP SERPL CALC-SCNC: 8 MMOL/L (ref 7–16)
AST SERPL-CCNC: 12 U/L (ref 12–45)
BASOPHILS # BLD AUTO: 0.2 % (ref 0–1.8)
BASOPHILS # BLD: 0.02 K/UL (ref 0–0.12)
BILIRUB SERPL-MCNC: 0.5 MG/DL (ref 0.1–1.5)
BUN SERPL-MCNC: 16 MG/DL (ref 8–22)
CALCIUM ALBUM COR SERPL-MCNC: 9.4 MG/DL (ref 8.5–10.5)
CALCIUM SERPL-MCNC: 9.5 MG/DL (ref 8.5–10.5)
CHLORIDE SERPL-SCNC: 107 MMOL/L (ref 96–112)
CO2 SERPL-SCNC: 28 MMOL/L (ref 20–33)
CREAT SERPL-MCNC: 1.19 MG/DL (ref 0.5–1.4)
EKG IMPRESSION: NORMAL
EOSINOPHIL # BLD AUTO: 0.18 K/UL (ref 0–0.51)
EOSINOPHIL NFR BLD: 1.7 % (ref 0–6.9)
ERYTHROCYTE [DISTWIDTH] IN BLOOD BY AUTOMATED COUNT: 48.7 FL (ref 35.9–50)
GFR SERPLBLD CREATININE-BSD FMLA CKD-EPI: 56 ML/MIN/1.73 M 2
GLOBULIN SER CALC-MCNC: 2.9 G/DL (ref 1.9–3.5)
GLUCOSE SERPL-MCNC: 135 MG/DL (ref 65–99)
HCT VFR BLD AUTO: 37.3 % (ref 37–47)
HGB BLD-MCNC: 11.2 G/DL (ref 12–16)
IMM GRANULOCYTES # BLD AUTO: 0.04 K/UL (ref 0–0.11)
IMM GRANULOCYTES NFR BLD AUTO: 0.4 % (ref 0–0.9)
LYMPHOCYTES # BLD AUTO: 1.89 K/UL (ref 1–4.8)
LYMPHOCYTES NFR BLD: 17.9 % (ref 22–41)
MCH RBC QN AUTO: 26.7 PG (ref 27–33)
MCHC RBC AUTO-ENTMCNC: 30 G/DL (ref 32.2–35.5)
MCV RBC AUTO: 89 FL (ref 81.4–97.8)
MONOCYTES # BLD AUTO: 0.43 K/UL (ref 0–0.85)
MONOCYTES NFR BLD AUTO: 4.1 % (ref 0–13.4)
NEUTROPHILS # BLD AUTO: 7.98 K/UL (ref 1.82–7.42)
NEUTROPHILS NFR BLD: 75.7 % (ref 44–72)
NRBC # BLD AUTO: 0 K/UL
NRBC BLD-RTO: 0 /100 WBC (ref 0–0.2)
PLATELET # BLD AUTO: 245 K/UL (ref 164–446)
PMV BLD AUTO: 11.3 FL (ref 9–12.9)
POTASSIUM SERPL-SCNC: 4.4 MMOL/L (ref 3.6–5.5)
PROT SERPL-MCNC: 7 G/DL (ref 6–8.2)
RBC # BLD AUTO: 4.19 M/UL (ref 4.2–5.4)
SODIUM SERPL-SCNC: 143 MMOL/L (ref 135–145)
WBC # BLD AUTO: 10.5 K/UL (ref 4.8–10.8)

## 2023-07-24 PROCEDURE — 71045 X-RAY EXAM CHEST 1 VIEW: CPT

## 2023-07-24 PROCEDURE — 80053 COMPREHEN METABOLIC PANEL: CPT

## 2023-07-24 PROCEDURE — 85025 COMPLETE CBC W/AUTO DIFF WBC: CPT

## 2023-07-24 PROCEDURE — 99284 EMERGENCY DEPT VISIT MOD MDM: CPT

## 2023-07-24 PROCEDURE — 93005 ELECTROCARDIOGRAM TRACING: CPT | Performed by: EMERGENCY MEDICINE

## 2023-07-24 PROCEDURE — 93005 ELECTROCARDIOGRAM TRACING: CPT

## 2023-07-24 PROCEDURE — 36415 COLL VENOUS BLD VENIPUNCTURE: CPT

## 2023-07-24 ASSESSMENT — FIBROSIS 4 INDEX: FIB4 SCORE: 0.54

## 2023-07-25 VITALS
RESPIRATION RATE: 20 BRPM | HEART RATE: 72 BPM | BODY MASS INDEX: 51.56 KG/M2 | HEIGHT: 62 IN | TEMPERATURE: 97.5 F | OXYGEN SATURATION: 100 % | SYSTOLIC BLOOD PRESSURE: 143 MMHG | DIASTOLIC BLOOD PRESSURE: 70 MMHG | WEIGHT: 280.2 LBS

## 2023-07-25 PROCEDURE — 94640 AIRWAY INHALATION TREATMENT: CPT

## 2023-07-25 PROCEDURE — 700101 HCHG RX REV CODE 250: Mod: UD | Performed by: EMERGENCY MEDICINE

## 2023-07-25 PROCEDURE — 700111 HCHG RX REV CODE 636 W/ 250 OVERRIDE (IP): Mod: UD | Performed by: EMERGENCY MEDICINE

## 2023-07-25 RX ORDER — PREDNISONE 20 MG/1
40 TABLET ORAL DAILY
Qty: 8 TABLET | Refills: 0 | Status: SHIPPED | OUTPATIENT
Start: 2023-07-25 | End: 2023-07-29

## 2023-07-25 RX ORDER — IPRATROPIUM BROMIDE AND ALBUTEROL SULFATE 2.5; .5 MG/3ML; MG/3ML
3 SOLUTION RESPIRATORY (INHALATION) ONCE
Status: COMPLETED | OUTPATIENT
Start: 2023-07-25 | End: 2023-07-25

## 2023-07-25 RX ORDER — AZITHROMYCIN 250 MG/1
TABLET, FILM COATED ORAL
Qty: 6 TABLET | Refills: 0 | Status: ACTIVE | OUTPATIENT
Start: 2023-07-25 | End: 2023-07-30

## 2023-07-25 RX ORDER — PREDNISONE 20 MG/1
40 TABLET ORAL ONCE
Status: COMPLETED | OUTPATIENT
Start: 2023-07-25 | End: 2023-07-25

## 2023-07-25 RX ADMIN — PREDNISONE 40 MG: 20 TABLET ORAL at 00:08

## 2023-07-25 RX ADMIN — IPRATROPIUM BROMIDE AND ALBUTEROL SULFATE 3 ML: 2.5; .5 SOLUTION RESPIRATORY (INHALATION) at 00:20

## 2023-07-25 NOTE — ED NOTES
Pt from triage to room 28 via WC. Pt changed into gown. Alert and orientedx4. GCS 15. Call light within reach. Connected to monitor.

## 2023-07-25 NOTE — ED TRIAGE NOTES
"Chief Complaint   Patient presents with    Shortness of Breath     Pt presents with SOB that started approximately 2 weeks ago. Pt states has recent dx of Bronchitis.  Pt endorses cough and yellow sputum.     Cough       Pt ambulatory to triage. Pt A&Ox4, for above complaint.     Pt to lobby . Pt educated on alerting staff in changes to condition. Pt verbalized understanding. Protocol SOB.     BP (!) 147/67   Pulse 73   Temp 36.6 °C (97.8 °F) (Temporal)   Resp 16   Ht 1.575 m (5' 2\")   Wt (!) 127 kg (280 lb 3.3 oz)   LMP  (LMP Unknown)   SpO2 95%   BMI 51.25 kg/m²   "

## 2023-07-25 NOTE — ED PROVIDER NOTES
ER Provider Note    Scribed for Dr. Rodney Otero M.D. by Arabella Upton. 7/24/2023  11:49 PM    Primary Care Provider: Heriberto Sharp M.D.    CHIEF COMPLAINT  Chief Complaint   Patient presents with    Shortness of Breath     Pt presents with SOB that started approximately 2 weeks ago. Pt states has recent dx of Bronchitis.  Pt endorses cough and yellow sputum.     Cough       EXTERNAL RECORDS REVIEWED  Other Patient has a history of Paroxysmal atrial fibrillation and is morbidly obese. She was seen by cardiology in May.      HPI/ROS    Lorene Haro is a 47 y.o. female who presents to the ED for a cough onset two weeks ago. The patient reports that she is coughing up a bunch of yellow and green mucus. She has associated symptoms of worsening shortness of breath. Patient reports that her oxygen at home has not been helping and she has been experiencing wheezing when she lays down to go to bed. She has a history of bronchitis and COPD. Patient has a 30 year long history of smoking but quit earlier this year.     PAST MEDICAL HISTORY  Past Medical History:   Diagnosis Date    A-fib (HCC)     Asthma     Bipolar 2 disorder (HCC)     Chickenpox     Chronic obstructive pulmonary disease (HCC)     Hypertension     On home oxygen therapy     Other psychological stress     Schizophrenic disorder (HCC)     Yeast infection of the skin 04/01/2021       SURGICAL HISTORY  Past Surgical History:   Procedure Laterality Date    CHOLECYSTECTOMY      COLECTOMY      HYSTERECTOMY LAPAROSCOPY      KNEE ARTHROSCOPY Left        FAMILY HISTORY  Family History   Problem Relation Age of Onset    No Known Problems Mother     Cancer Sister        SOCIAL HISTORY   reports that she quit smoking about 9 months ago. Her smoking use included cigarettes. She smoked an average of .25 packs per day. She has never used smokeless tobacco. She reports that she does not currently use alcohol. She reports that she does not use drugs.    CURRENT  "MEDICATIONS  Previous Medications    ADVAIR DISKUS 100-50 MCG/ACT AEROSOL POWDER, BREATH ACTIVATED    INHALE 1 PUFF BY MOUTH EVERY 12 HOURS    ALBUTEROL 108 (90 BASE) MCG/ACT AERO SOLN INHALATION AEROSOL    Inhale 2 Puffs every 6 hours as needed for Shortness of Breath.    ARIPIPRAZOLE (ABILIFY) 30 MG TABLET    Take 1 Tablet by mouth every morning. Indications: Major Depressive Disorder    ASPIRIN 81 MG EC TABLET    Take 81 mg by mouth every day.    ATORVASTATIN (LIPITOR) 20 MG TAB    Take 1 Tablet by mouth at bedtime.    FAMOTIDINE (PEPCID) 20 MG TAB    Take 1 Tablet by mouth 2 times a day as needed (pain). For upper belly pain    FLUTICASONE (FLONASE) 50 MCG/ACT NASAL SPRAY        FUROSEMIDE (LASIX) 20 MG TAB    Take 1 Tablet by mouth every day.    METHOCARBAMOL (ROBAXIN) 750 MG TAB    Take 2 Tablets by mouth 3 times a day.    METOPROLOL SR (TOPROL XL) 25 MG TABLET SR 24 HR    Take 0.5 Tablets by mouth every day.    MONTELUKAST (SINGULAIR) 10 MG TAB    Take 1 Tablet by mouth every evening.    ONDANSETRON (ZOFRAN ODT) 4 MG TABLET DISPERSIBLE    Take 1 Tablet by mouth every 6 hours as needed for Nausea/Vomiting.    POTASSIUM CHLORIDE (KLOR-CON) 20 MEQ PACK    Take 1 Packet by mouth every day.    SENNOSIDES (SENOKOT) 8.6 MG TAB    Take 1 Tablet by mouth 1 time a day as needed (constipation).    TRAZODONE (DESYREL) 50 MG TAB    Take 1 Tablet by mouth at bedtime.       ALLERGIES  Penicillin g and Amoxicillin    PHYSICAL EXAM  BP (!) 147/67   Pulse 73   Temp 36.6 °C (97.8 °F) (Temporal)   Resp 16   Ht 1.575 m (5' 2\")   Wt (!) 127 kg (280 lb 3.3 oz)   LMP  (LMP Unknown)   SpO2 95%   BMI 51.25 kg/m²   Constitutional: Alert in no apparent distress. Obese  HENT: No signs of trauma, Bilateral external ears normal, Nose normal.   Eyes: Pupils are equal and reactive, Conjunctiva normal, Non-icteric.   Neck: Normal range of motion, No tenderness, Supple, No stridor.   Lymphatic: No lymphadenopathy noted. "   Cardiovascular: Regular rate and rhythm, no murmurs.   Thorax & Lungs: Slightly diminished lung sounds at the base, No respiratory distress, No wheezing, No chest tenderness.   Abdomen: Bowel sounds normal, Soft, No tenderness, No masses, No pulsatile masses. No peritoneal signs.  Skin: Warm, Dry, No erythema, No rash.   Back: No bony tenderness, No CVA tenderness.   Extremities: Intact distal pulses, No edema, No tenderness, No cyanosis.  Musculoskeletal: Good range of motion in all major joints. No tenderness to palpation or major deformities noted.   Neurologic: Alert , Normal motor function, Normal sensory function, No focal deficits noted.   Psychiatric: Affect normal, Judgment normal, Mood normal.     DIAGNOSTIC STUDIES & PROCEDURES    Labs:   Labs Reviewed   CBC WITH DIFFERENTIAL - Abnormal; Notable for the following components:       Result Value    RBC 4.19 (*)     Hemoglobin 11.2 (*)     MCH 26.7 (*)     MCHC 30.0 (*)     Neutrophils-Polys 75.70 (*)     Lymphocytes 17.90 (*)     Neutrophils (Absolute) 7.98 (*)     All other components within normal limits   COMP METABOLIC PANEL - Abnormal; Notable for the following components:    Glucose 135 (*)     Alkaline Phosphatase 142 (*)     All other components within normal limits   ESTIMATED GFR - Abnormal; Notable for the following components:    GFR (CKD-EPI) 56 (*)     All other components within normal limits      All labs reviewed by me.    EKG Interpretation:  Interpreted by me    12 Lead EKG interpreted by me to show:  Normal sinus rhythm  Rate 79  Axis: Normal  Intervals: Normal  Normal T waves  No ST elevations  My impression of this EKG: Does not indicate ischemia or arrhythmia at this time.     Radiology:   The attending Emergency Physician has independently interpreted the diagnostic imaging associated with this visit and is awaiting the final reading from the radiologist, which will be displayed below.    Preliminary interpretation is a follows: No  obvious lobar pneumonia  Radiologist interpretation:     DX-CHEST-PORTABLE (1 VIEW)   Final Result         1.  Left basilar atelectasis and/or scarring, similar to prior study.   2.  Cardiomegaly           COURSE & MEDICAL DECISION MAKING    ED Observation Status? Yes; I am placing the patient in to an observation status due to a diagnostic uncertainty as well as therapeutic intensity. Patient placed in observation status at 11:57 PM, 7/24/2023.     Observation plan is as follows: Monitor for symptom management and diagnostic results     Upon Reevaluation, the patient's condition has: Improved; and will be discharged.    Patient discharged from ED Observation status at 12:11 AM (Time) 7/25/2023 (Date).     INITIAL ASSESSMENT AND PLAN  Care Narrative:       11:49 PM - Patient seen and evaluated at bedside. Discussed plan of care, including labs and imaging. Patient agrees to plan of care. Patient will be treated with Deltasone 40mg for her symptoms. Ordered EKG, CMP, CBC w/diff, Estimated GFR, DX-Chest to evaluate.    12:11 AM - Patient was reevaluated at bedside. The patient will be discharged with Deltasone 20mg, Zithromax 250mg and should return if symptoms worsen or if new symptoms arise. The patient understands and agrees to plan.      ADDITIONAL PROBLEM LIST AND DISPOSITION  Patient presents with what appears to be a COPD exacerbation.  We will treat her with nebs as well as steroids.  Antibiotics will also necessary.  We will get a chest x-ray to evaluate for lobar pneumonia.  We will evaluate for anemia as well as electrolyte derangement and reassess        Patient has a nonischemic EKG.  There is a mild anemia.  There is no significant electrolyte derangement.  No pneumonia that is lobar.  We will give the patient steroids as an outpatient as well as antibiotics.  We will treat this as a COPD exacerbation ultimately.  We will discharge home with strict return precautions and follow-up.                  DISPOSITION AND DISCUSSIONS    Escalation of care considered, and ultimately not performed: acute inpatient care management, however at this time, the patient is most appropriate for outpatient management.    Decision tools and prescription drugs considered including, but not limited to: Antibiotics Deltasone 20mg, Zithromax 250mg .    The patient will return for new or worsening symptoms and is stable at the time of discharge.    The patient is referred to a primary physician for blood pressure management, diabetic screening, and for all other preventative health concerns.    DISPOSITION:  Patient will be discharged home in stable condition.    FOLLOW UP:  Heriberto Sharp M.D.  [address] [facility] [id number]  [phone]    In 2 days      OUTPATIENT MEDICATIONS:  New Prescriptions    AZITHROMYCIN (ZITHROMAX) 250 MG TAB    Take 2 Tablets by mouth every day for 1 day, THEN 1 Tablet every day for 4 days.    PREDNISONE (DELTASONE) 20 MG TAB    Take 2 Tablets by mouth every day for 4 days.      FINAL IMPRESSION   1. Acute exacerbation of chronic obstructive pulmonary disease (COPD) (HCC)    2. Atypical pneumonia         Arabella HARRIS (Ade), am scribing for, and in the presence of, Rodney Otero M.D..    Electronically signed by: Arabella Upton (Ade), 7/24/2023    Rodney HARRIS M.D. personally performed the services described in this documentation, as scribed by Arabella Upton in my presence, and it is both accurate and complete.    The note accurately reflects work and decisions made by me.  Rodney Otero M.D.  7/25/2023  5:00 AM

## 2023-07-25 NOTE — ED NOTES
Discharge instructions given to pt. Pt well understood. All questions have been addressed. Discharged in stable condition.

## 2023-07-27 ENCOUNTER — NON-PROVIDER VISIT (OUTPATIENT)
Dept: CARDIOLOGY | Facility: MEDICAL CENTER | Age: 48
End: 2023-07-27
Payer: MEDICAID

## 2023-07-27 PROCEDURE — 93298 REM INTERROG DEV EVAL SCRMS: CPT | Performed by: INTERNAL MEDICINE

## 2023-07-28 NOTE — CARDIAC REMOTE MONITOR - SCAN
Device transmission reviewed. Device demonstrated appropriate function.       Electronically Signed by: Tyrell Solomon M.D.    8/3/2023  3:31 AM

## 2023-07-31 ENCOUNTER — APPOINTMENT (OUTPATIENT)
Dept: RADIOLOGY | Facility: MEDICAL CENTER | Age: 48
End: 2023-07-31
Attending: STUDENT IN AN ORGANIZED HEALTH CARE EDUCATION/TRAINING PROGRAM
Payer: MEDICAID

## 2023-07-31 ENCOUNTER — HOSPITAL ENCOUNTER (OUTPATIENT)
Facility: MEDICAL CENTER | Age: 48
End: 2023-08-01
Attending: STUDENT IN AN ORGANIZED HEALTH CARE EDUCATION/TRAINING PROGRAM | Admitting: STUDENT IN AN ORGANIZED HEALTH CARE EDUCATION/TRAINING PROGRAM
Payer: MEDICAID

## 2023-07-31 DIAGNOSIS — M79.601 RIGHT ARM PAIN: ICD-10-CM

## 2023-07-31 DIAGNOSIS — R42 LIGHTHEADEDNESS: ICD-10-CM

## 2023-07-31 DIAGNOSIS — S52.124A CLOSED NONDISPLACED FRACTURE OF HEAD OF RIGHT RADIUS, INITIAL ENCOUNTER: ICD-10-CM

## 2023-07-31 DIAGNOSIS — J18.9 PNEUMONIA OF LEFT LOWER LOBE DUE TO INFECTIOUS ORGANISM: ICD-10-CM
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
## 2023-07-31 LAB
ALBUMIN SERPL BCP-MCNC: 3.8 G/DL (ref 3.2–4.9)
ALBUMIN/GLOB SERPL: 1.4 G/DL
ALP SERPL-CCNC: 114 U/L (ref 30–99)
ALT SERPL-CCNC: 18 U/L (ref 2–50)
ANION GAP SERPL CALC-SCNC: 10 MMOL/L (ref 7–16)
AST SERPL-CCNC: 9 U/L (ref 12–45)
BASOPHILS # BLD AUTO: 0.1 % (ref 0–1.8)
BASOPHILS # BLD: 0.02 K/UL (ref 0–0.12)
BILIRUB SERPL-MCNC: 0.4 MG/DL (ref 0.1–1.5)
BUN SERPL-MCNC: 19 MG/DL (ref 8–22)
CALCIUM ALBUM COR SERPL-MCNC: 9.6 MG/DL (ref 8.5–10.5)
CALCIUM SERPL-MCNC: 9.4 MG/DL (ref 8.5–10.5)
CHLORIDE SERPL-SCNC: 105 MMOL/L (ref 96–112)
CO2 SERPL-SCNC: 26 MMOL/L (ref 20–33)
CREAT SERPL-MCNC: 1.04 MG/DL (ref 0.5–1.4)
D DIMER PPP IA.FEU-MCNC: <0.27 UG/ML (FEU) (ref 0–0.5)
EKG IMPRESSION: NORMAL
EOSINOPHIL # BLD AUTO: 0.15 K/UL (ref 0–0.51)
EOSINOPHIL NFR BLD: 1.1 % (ref 0–6.9)
ERYTHROCYTE [DISTWIDTH] IN BLOOD BY AUTOMATED COUNT: 49.2 FL (ref 35.9–50)
GFR SERPLBLD CREATININE-BSD FMLA CKD-EPI: 66 ML/MIN/1.73 M 2
GLOBULIN SER CALC-MCNC: 2.7 G/DL (ref 1.9–3.5)
GLUCOSE SERPL-MCNC: 164 MG/DL (ref 65–99)
HCT VFR BLD AUTO: 35.6 % (ref 37–47)
HGB BLD-MCNC: 11 G/DL (ref 12–16)
IMM GRANULOCYTES # BLD AUTO: 0.08 K/UL (ref 0–0.11)
IMM GRANULOCYTES NFR BLD AUTO: 0.6 % (ref 0–0.9)
LYMPHOCYTES # BLD AUTO: 2.54 K/UL (ref 1–4.8)
LYMPHOCYTES NFR BLD: 18.6 % (ref 22–41)
MCH RBC QN AUTO: 27 PG (ref 27–33)
MCHC RBC AUTO-ENTMCNC: 30.9 G/DL (ref 32.2–35.5)
MCV RBC AUTO: 87.5 FL (ref 81.4–97.8)
MONOCYTES # BLD AUTO: 0.7 K/UL (ref 0–0.85)
MONOCYTES NFR BLD AUTO: 5.1 % (ref 0–13.4)
NEUTROPHILS # BLD AUTO: 10.16 K/UL (ref 1.82–7.42)
NEUTROPHILS NFR BLD: 74.5 % (ref 44–72)
NRBC # BLD AUTO: 0 K/UL
NRBC BLD-RTO: 0 /100 WBC (ref 0–0.2)
NT-PROBNP SERPL IA-MCNC: 184 PG/ML (ref 0–125)
PLATELET # BLD AUTO: 248 K/UL (ref 164–446)
PMV BLD AUTO: 11.5 FL (ref 9–12.9)
POTASSIUM SERPL-SCNC: 4.4 MMOL/L (ref 3.6–5.5)
PROT SERPL-MCNC: 6.5 G/DL (ref 6–8.2)
RBC # BLD AUTO: 4.07 M/UL (ref 4.2–5.4)
SODIUM SERPL-SCNC: 141 MMOL/L (ref 135–145)
TROPONIN T SERPL-MCNC: 38 NG/L (ref 6–19)
WBC # BLD AUTO: 13.7 K/UL (ref 4.8–10.8)

## 2023-07-31 PROCEDURE — 94640 AIRWAY INHALATION TREATMENT: CPT

## 2023-07-31 PROCEDURE — 84145 PROCALCITONIN (PCT): CPT

## 2023-07-31 PROCEDURE — 73130 X-RAY EXAM OF HAND: CPT | Mod: RT

## 2023-07-31 PROCEDURE — A9270 NON-COVERED ITEM OR SERVICE: HCPCS | Mod: UD | Performed by: STUDENT IN AN ORGANIZED HEALTH CARE EDUCATION/TRAINING PROGRAM

## 2023-07-31 PROCEDURE — 71045 X-RAY EXAM CHEST 1 VIEW: CPT

## 2023-07-31 PROCEDURE — 99285 EMERGENCY DEPT VISIT HI MDM: CPT

## 2023-07-31 PROCEDURE — 85379 FIBRIN DEGRADATION QUANT: CPT

## 2023-07-31 PROCEDURE — 80053 COMPREHEN METABOLIC PANEL: CPT

## 2023-07-31 PROCEDURE — 700105 HCHG RX REV CODE 258: Mod: JZ,UD | Performed by: STUDENT IN AN ORGANIZED HEALTH CARE EDUCATION/TRAINING PROGRAM

## 2023-07-31 PROCEDURE — 36415 COLL VENOUS BLD VENIPUNCTURE: CPT

## 2023-07-31 PROCEDURE — 73030 X-RAY EXAM OF SHOULDER: CPT | Mod: RT

## 2023-07-31 PROCEDURE — 83880 ASSAY OF NATRIURETIC PEPTIDE: CPT

## 2023-07-31 PROCEDURE — 73080 X-RAY EXAM OF ELBOW: CPT | Mod: RT

## 2023-07-31 PROCEDURE — 700101 HCHG RX REV CODE 250: Mod: UD | Performed by: STUDENT IN AN ORGANIZED HEALTH CARE EDUCATION/TRAINING PROGRAM

## 2023-07-31 PROCEDURE — 85025 COMPLETE CBC W/AUTO DIFF WBC: CPT

## 2023-07-31 PROCEDURE — 84484 ASSAY OF TROPONIN QUANT: CPT

## 2023-07-31 PROCEDURE — 93005 ELECTROCARDIOGRAM TRACING: CPT | Performed by: STUDENT IN AN ORGANIZED HEALTH CARE EDUCATION/TRAINING PROGRAM

## 2023-07-31 PROCEDURE — 700102 HCHG RX REV CODE 250 W/ 637 OVERRIDE(OP): Mod: UD | Performed by: STUDENT IN AN ORGANIZED HEALTH CARE EDUCATION/TRAINING PROGRAM

## 2023-07-31 RX ORDER — ASPIRIN 325 MG
325 TABLET ORAL ONCE
Status: COMPLETED | OUTPATIENT
Start: 2023-07-31 | End: 2023-07-31

## 2023-07-31 RX ORDER — SODIUM CHLORIDE, SODIUM LACTATE, POTASSIUM CHLORIDE, CALCIUM CHLORIDE 600; 310; 30; 20 MG/100ML; MG/100ML; MG/100ML; MG/100ML
1000 INJECTION, SOLUTION INTRAVENOUS ONCE
Status: COMPLETED | OUTPATIENT
Start: 2023-07-31 | End: 2023-08-01

## 2023-07-31 RX ORDER — IPRATROPIUM BROMIDE AND ALBUTEROL SULFATE 2.5; .5 MG/3ML; MG/3ML
3 SOLUTION RESPIRATORY (INHALATION) ONCE
Status: COMPLETED | OUTPATIENT
Start: 2023-07-31 | End: 2023-07-31

## 2023-07-31 RX ADMIN — SODIUM CHLORIDE, POTASSIUM CHLORIDE, SODIUM LACTATE AND CALCIUM CHLORIDE 1000 ML: 600; 310; 30; 20 INJECTION, SOLUTION INTRAVENOUS at 23:27

## 2023-07-31 RX ADMIN — ASPIRIN 325 MG: 325 TABLET ORAL at 23:34

## 2023-07-31 RX ADMIN — IPRATROPIUM BROMIDE AND ALBUTEROL SULFATE 3 ML: 2.5; .5 SOLUTION RESPIRATORY (INHALATION) at 22:54

## 2023-07-31 ASSESSMENT — FIBROSIS 4 INDEX: FIB4 SCORE: 0.55

## 2023-07-31 ASSESSMENT — LIFESTYLE VARIABLES: DO YOU DRINK ALCOHOL: NO

## 2023-08-01 ENCOUNTER — PHARMACY VISIT (OUTPATIENT)
Dept: PHARMACY | Facility: MEDICAL CENTER | Age: 48
End: 2023-08-01
Payer: COMMERCIAL

## 2023-08-01 VITALS
TEMPERATURE: 97 F | DIASTOLIC BLOOD PRESSURE: 51 MMHG | RESPIRATION RATE: 20 BRPM | HEART RATE: 74 BPM | OXYGEN SATURATION: 94 % | HEIGHT: 61 IN | WEIGHT: 283.73 LBS | BODY MASS INDEX: 53.57 KG/M2 | SYSTOLIC BLOOD PRESSURE: 127 MMHG

## 2023-08-01 PROBLEM — R55 POSTURAL DIZZINESS WITH PRESYNCOPE: Status: ACTIVE | Noted: 2023-08-01

## 2023-08-01 PROBLEM — R42 POSTURAL DIZZINESS WITH PRESYNCOPE: Status: ACTIVE | Noted: 2023-08-01

## 2023-08-01 LAB
ALBUMIN SERPL BCP-MCNC: 3.8 G/DL (ref 3.2–4.9)
ANISOCYTOSIS BLD QL SMEAR: ABNORMAL
BASOPHILS # BLD AUTO: 0.1 % (ref 0–1.8)
BASOPHILS # BLD: 0.01 K/UL (ref 0–0.12)
BLOOD CULTURE HOLD CXBCH: NORMAL
BUN SERPL-MCNC: 17 MG/DL (ref 8–22)
CALCIUM ALBUM COR SERPL-MCNC: 9.5 MG/DL (ref 8.5–10.5)
CALCIUM SERPL-MCNC: 9.3 MG/DL (ref 8.5–10.5)
CHLORIDE SERPL-SCNC: 106 MMOL/L (ref 96–112)
CO2 SERPL-SCNC: 26 MMOL/L (ref 20–33)
COMMENT 1642: NORMAL
CREAT SERPL-MCNC: 0.88 MG/DL (ref 0.5–1.4)
EKG IMPRESSION: NORMAL
EOSINOPHIL # BLD AUTO: 0 K/UL (ref 0–0.51)
EOSINOPHIL NFR BLD: 0 % (ref 0–6.9)
ERYTHROCYTE [DISTWIDTH] IN BLOOD BY AUTOMATED COUNT: 50.9 FL (ref 35.9–50)
GFR SERPLBLD CREATININE-BSD FMLA CKD-EPI: 80 ML/MIN/1.73 M 2
GLUCOSE SERPL-MCNC: 206 MG/DL (ref 65–99)
HCT VFR BLD AUTO: 39.9 % (ref 37–47)
HGB BLD-MCNC: 11.8 G/DL (ref 12–16)
IMM GRANULOCYTES # BLD AUTO: 0.07 K/UL (ref 0–0.11)
IMM GRANULOCYTES NFR BLD AUTO: 0.8 % (ref 0–0.9)
LYMPHOCYTES # BLD AUTO: 0.7 K/UL (ref 1–4.8)
LYMPHOCYTES NFR BLD: 8 % (ref 22–41)
MACROCYTES BLD QL SMEAR: ABNORMAL
MAGNESIUM SERPL-MCNC: 2.2 MG/DL (ref 1.5–2.5)
MCH RBC QN AUTO: 26.5 PG (ref 27–33)
MCHC RBC AUTO-ENTMCNC: 29.6 G/DL (ref 32.2–35.5)
MCV RBC AUTO: 89.7 FL (ref 81.4–97.8)
MICROCYTES BLD QL SMEAR: ABNORMAL
MONOCYTES # BLD AUTO: 0.05 K/UL (ref 0–0.85)
MONOCYTES NFR BLD AUTO: 0.6 % (ref 0–13.4)
MORPHOLOGY BLD-IMP: NORMAL
NEUTROPHILS # BLD AUTO: 7.9 K/UL (ref 1.82–7.42)
NEUTROPHILS NFR BLD: 90.5 % (ref 44–72)
NRBC # BLD AUTO: 0 K/UL
NRBC BLD-RTO: 0 /100 WBC (ref 0–0.2)
OVALOCYTES BLD QL SMEAR: NORMAL
PHOSPHATE SERPL-MCNC: 2.4 MG/DL (ref 2.5–4.5)
PLATELET # BLD AUTO: 226 K/UL (ref 164–446)
PLATELET BLD QL SMEAR: NORMAL
PMV BLD AUTO: 11.2 FL (ref 9–12.9)
POIKILOCYTOSIS BLD QL SMEAR: NORMAL
POLYCHROMASIA BLD QL SMEAR: NORMAL
POTASSIUM SERPL-SCNC: 4.8 MMOL/L (ref 3.6–5.5)
PROCALCITONIN SERPL-MCNC: 0.08 NG/ML
RBC # BLD AUTO: 4.45 M/UL (ref 4.2–5.4)
RBC BLD AUTO: PRESENT
SODIUM SERPL-SCNC: 140 MMOL/L (ref 135–145)
TROPONIN T SERPL-MCNC: 37 NG/L (ref 6–19)
WBC # BLD AUTO: 8.7 K/UL (ref 4.8–10.8)

## 2023-08-01 PROCEDURE — 94664 DEMO&/EVAL PT USE INHALER: CPT

## 2023-08-01 PROCEDURE — G0378 HOSPITAL OBSERVATION PER HR: HCPCS

## 2023-08-01 PROCEDURE — 99239 HOSP IP/OBS DSCHRG MGMT >30: CPT | Mod: GC | Performed by: INTERNAL MEDICINE

## 2023-08-01 PROCEDURE — 96375 TX/PRO/DX INJ NEW DRUG ADDON: CPT

## 2023-08-01 PROCEDURE — 99223 1ST HOSP IP/OBS HIGH 75: CPT | Performed by: STUDENT IN AN ORGANIZED HEALTH CARE EDUCATION/TRAINING PROGRAM

## 2023-08-01 PROCEDURE — 85025 COMPLETE CBC W/AUTO DIFF WBC: CPT

## 2023-08-01 PROCEDURE — 80069 RENAL FUNCTION PANEL: CPT

## 2023-08-01 PROCEDURE — 700105 HCHG RX REV CODE 258: Mod: UD | Performed by: STUDENT IN AN ORGANIZED HEALTH CARE EDUCATION/TRAINING PROGRAM

## 2023-08-01 PROCEDURE — 700101 HCHG RX REV CODE 250: Mod: UD | Performed by: STUDENT IN AN ORGANIZED HEALTH CARE EDUCATION/TRAINING PROGRAM

## 2023-08-01 PROCEDURE — 700111 HCHG RX REV CODE 636 W/ 250 OVERRIDE (IP): Mod: JZ,UD | Performed by: STUDENT IN AN ORGANIZED HEALTH CARE EDUCATION/TRAINING PROGRAM

## 2023-08-01 PROCEDURE — 700102 HCHG RX REV CODE 250 W/ 637 OVERRIDE(OP): Mod: UD | Performed by: STUDENT IN AN ORGANIZED HEALTH CARE EDUCATION/TRAINING PROGRAM

## 2023-08-01 PROCEDURE — RXMED WILLOW AMBULATORY MEDICATION CHARGE

## 2023-08-01 PROCEDURE — 700102 HCHG RX REV CODE 250 W/ 637 OVERRIDE(OP): Mod: UD

## 2023-08-01 PROCEDURE — 83735 ASSAY OF MAGNESIUM: CPT

## 2023-08-01 PROCEDURE — A9270 NON-COVERED ITEM OR SERVICE: HCPCS | Mod: UD | Performed by: STUDENT IN AN ORGANIZED HEALTH CARE EDUCATION/TRAINING PROGRAM

## 2023-08-01 PROCEDURE — 96365 THER/PROPH/DIAG IV INF INIT: CPT

## 2023-08-01 RX ORDER — ENOXAPARIN SODIUM 100 MG/ML
40 INJECTION SUBCUTANEOUS DAILY
Status: DISCONTINUED | OUTPATIENT
Start: 2023-08-01 | End: 2023-08-01 | Stop reason: HOSPADM

## 2023-08-01 RX ORDER — ATORVASTATIN CALCIUM 20 MG/1
20 TABLET, FILM COATED ORAL
Status: DISCONTINUED | OUTPATIENT
Start: 2023-08-01 | End: 2023-08-01 | Stop reason: HOSPADM

## 2023-08-01 RX ORDER — DEXTROSE MONOHYDRATE 25 G/50ML
25 INJECTION, SOLUTION INTRAVENOUS
Status: DISCONTINUED | OUTPATIENT
Start: 2023-08-01 | End: 2023-08-01 | Stop reason: HOSPADM

## 2023-08-01 RX ORDER — ASPIRIN 81 MG/1
81 TABLET ORAL DAILY
Status: DISCONTINUED | OUTPATIENT
Start: 2023-08-01 | End: 2023-08-01

## 2023-08-01 RX ORDER — IBUPROFEN 800 MG/1
800 TABLET ORAL EVERY 8 HOURS PRN
Status: ON HOLD | COMMUNITY
Start: 2023-07-11 | End: 2023-08-01

## 2023-08-01 RX ORDER — METHYLPREDNISOLONE SODIUM SUCCINATE 125 MG/2ML
125 INJECTION, POWDER, LYOPHILIZED, FOR SOLUTION INTRAMUSCULAR; INTRAVENOUS ONCE
Status: COMPLETED | OUTPATIENT
Start: 2023-08-01 | End: 2023-08-01

## 2023-08-01 RX ORDER — AMOXICILLIN 250 MG
2 CAPSULE ORAL 2 TIMES DAILY
Status: DISCONTINUED | OUTPATIENT
Start: 2023-08-01 | End: 2023-08-01 | Stop reason: HOSPADM

## 2023-08-01 RX ORDER — TRAZODONE HYDROCHLORIDE 50 MG/1
50 TABLET ORAL
Status: DISCONTINUED | OUTPATIENT
Start: 2023-08-01 | End: 2023-08-01

## 2023-08-01 RX ORDER — CLINDAMYCIN HYDROCHLORIDE 150 MG/1
150 CAPSULE ORAL 4 TIMES DAILY
Status: ON HOLD | COMMUNITY
Start: 2023-07-11 | End: 2023-08-01

## 2023-08-01 RX ORDER — ALBUTEROL SULFATE 90 UG/1
2 AEROSOL, METERED RESPIRATORY (INHALATION) EVERY 6 HOURS PRN
Qty: 18 G | Refills: 0 | Status: SHIPPED | OUTPATIENT
Start: 2023-08-01 | End: 2023-08-17 | Stop reason: SDUPTHER

## 2023-08-01 RX ORDER — MONTELUKAST SODIUM 10 MG/1
10 TABLET ORAL EVERY EVENING
Status: DISCONTINUED | OUTPATIENT
Start: 2023-08-01 | End: 2023-08-01 | Stop reason: HOSPADM

## 2023-08-01 RX ORDER — AZITHROMYCIN 500 MG/5ML
500 INJECTION, POWDER, LYOPHILIZED, FOR SOLUTION INTRAVENOUS ONCE
Status: COMPLETED | OUTPATIENT
Start: 2023-08-01 | End: 2023-08-01

## 2023-08-01 RX ORDER — METOPROLOL SUCCINATE 25 MG/1
12.5 TABLET, EXTENDED RELEASE ORAL DAILY
Status: DISCONTINUED | OUTPATIENT
Start: 2023-08-01 | End: 2023-08-01 | Stop reason: HOSPADM

## 2023-08-01 RX ORDER — NICOTINE 21 MG/24HR
1 PATCH, TRANSDERMAL 24 HOURS TRANSDERMAL DAILY
Status: ON HOLD | COMMUNITY
Start: 2023-06-15 | End: 2023-08-01

## 2023-08-01 RX ORDER — METOPROLOL SUCCINATE 25 MG/1
25 TABLET, EXTENDED RELEASE ORAL DAILY
COMMUNITY
End: 2023-10-12

## 2023-08-01 RX ORDER — CEFTRIAXONE 2 G/1
2000 INJECTION, POWDER, FOR SOLUTION INTRAMUSCULAR; INTRAVENOUS ONCE
Status: COMPLETED | OUTPATIENT
Start: 2023-08-01 | End: 2023-08-01

## 2023-08-01 RX ORDER — ARIPIPRAZOLE 10 MG/1
30 TABLET ORAL EVERY MORNING
Status: DISCONTINUED | OUTPATIENT
Start: 2023-08-01 | End: 2023-08-01 | Stop reason: HOSPADM

## 2023-08-01 RX ORDER — FLUTICASONE PROPIONATE AND SALMETEROL 100; 50 UG/1; UG/1
1 POWDER RESPIRATORY (INHALATION) EVERY 12 HOURS
Status: DISCONTINUED | OUTPATIENT
Start: 2023-08-01 | End: 2023-08-01

## 2023-08-01 RX ORDER — NAPROXEN 375 MG/1
375 TABLET ORAL 2 TIMES DAILY WITH MEALS
Status: ON HOLD | COMMUNITY
End: 2023-08-01

## 2023-08-01 RX ORDER — FAMOTIDINE 20 MG/1
20 TABLET, FILM COATED ORAL 2 TIMES DAILY PRN
Status: DISCONTINUED | OUTPATIENT
Start: 2023-08-01 | End: 2023-08-01 | Stop reason: HOSPADM

## 2023-08-01 RX ORDER — AZITHROMYCIN 250 MG/1
250-500 TABLET, FILM COATED ORAL DAILY
Status: ON HOLD | COMMUNITY
End: 2023-08-01

## 2023-08-01 RX ORDER — PREDNISONE 20 MG/1
20 TABLET ORAL 2 TIMES DAILY
Status: ON HOLD | COMMUNITY
End: 2023-08-01

## 2023-08-01 RX ORDER — APIXABAN 5 MG/1
5 TABLET, FILM COATED ORAL 2 TIMES DAILY
Status: ON HOLD | COMMUNITY
Start: 2023-06-12 | End: 2023-08-01

## 2023-08-01 RX ORDER — ALBUTEROL SULFATE 90 UG/1
2 AEROSOL, METERED RESPIRATORY (INHALATION)
Status: DISCONTINUED | OUTPATIENT
Start: 2023-08-01 | End: 2023-08-01 | Stop reason: HOSPADM

## 2023-08-01 RX ORDER — BISACODYL 10 MG
10 SUPPOSITORY, RECTAL RECTAL
Status: DISCONTINUED | OUTPATIENT
Start: 2023-08-01 | End: 2023-08-01 | Stop reason: HOSPADM

## 2023-08-01 RX ORDER — POLYETHYLENE GLYCOL 3350 17 G/17G
1 POWDER, FOR SOLUTION ORAL
Status: DISCONTINUED | OUTPATIENT
Start: 2023-08-01 | End: 2023-08-01 | Stop reason: HOSPADM

## 2023-08-01 RX ADMIN — METOPROLOL SUCCINATE 12.5 MG: 25 TABLET, EXTENDED RELEASE ORAL at 05:09

## 2023-08-01 RX ADMIN — AZITHROMYCIN 500 MG: 500 INJECTION, POWDER, LYOPHILIZED, FOR SOLUTION INTRAVENOUS at 01:18

## 2023-08-01 RX ADMIN — CEFTRIAXONE SODIUM 2000 MG: 2 INJECTION, POWDER, FOR SOLUTION INTRAMUSCULAR; INTRAVENOUS at 00:29

## 2023-08-01 RX ADMIN — ARIPIPRAZOLE 30 MG: 10 TABLET ORAL at 05:09

## 2023-08-01 RX ADMIN — METHYLPREDNISOLONE SODIUM SUCCINATE 125 MG: 125 INJECTION, POWDER, FOR SOLUTION INTRAMUSCULAR; INTRAVENOUS at 00:31

## 2023-08-01 RX ADMIN — SENNOSIDES AND DOCUSATE SODIUM 2 TABLET: 50; 8.6 TABLET ORAL at 05:09

## 2023-08-01 ASSESSMENT — ENCOUNTER SYMPTOMS
WHEEZING: 1
NAUSEA: 0
PALPITATIONS: 0
SORE THROAT: 0
BACK PAIN: 0
FEVER: 0
HEARTBURN: 0
SPUTUM PRODUCTION: 0
CHILLS: 0
BLURRED VISION: 0
DOUBLE VISION: 0
HEADACHES: 0
MYALGIAS: 0
DIZZINESS: 0
ABDOMINAL PAIN: 0
COUGH: 1
VOMITING: 0
SHORTNESS OF BREATH: 1

## 2023-08-01 ASSESSMENT — COGNITIVE AND FUNCTIONAL STATUS - GENERAL
TOILETING: A LITTLE
DRESSING REGULAR LOWER BODY CLOTHING: A LITTLE
HELP NEEDED FOR BATHING: A LITTLE
MOVING FROM LYING ON BACK TO SITTING ON SIDE OF FLAT BED: A LITTLE
MOVING TO AND FROM BED TO CHAIR: A LITTLE
WALKING IN HOSPITAL ROOM: A LITTLE
SUGGESTED CMS G CODE MODIFIER DAILY ACTIVITY: CJ
DRESSING REGULAR UPPER BODY CLOTHING: A LITTLE
MOBILITY SCORE: 19
CLIMB 3 TO 5 STEPS WITH RAILING: A LITTLE
DAILY ACTIVITIY SCORE: 20
SUGGESTED CMS G CODE MODIFIER MOBILITY: CK
STANDING UP FROM CHAIR USING ARMS: A LITTLE

## 2023-08-01 ASSESSMENT — LIFESTYLE VARIABLES
TOTAL SCORE: 0
ALCOHOL_USE: NO
ON A TYPICAL DAY WHEN YOU DRINK ALCOHOL HOW MANY DRINKS DO YOU HAVE: 0
AVERAGE NUMBER OF DAYS PER WEEK YOU HAVE A DRINK CONTAINING ALCOHOL: 0
TOTAL SCORE: 0
HOW MANY TIMES IN THE PAST YEAR HAVE YOU HAD 5 OR MORE DRINKS IN A DAY: 0
TOTAL SCORE: 0
EVER FELT BAD OR GUILTY ABOUT YOUR DRINKING: NO
HAVE PEOPLE ANNOYED YOU BY CRITICIZING YOUR DRINKING: NO
HAVE YOU EVER FELT YOU SHOULD CUT DOWN ON YOUR DRINKING: NO
EVER HAD A DRINK FIRST THING IN THE MORNING TO STEADY YOUR NERVES TO GET RID OF A HANGOVER: NO
CONSUMPTION TOTAL: NEGATIVE
DOES PATIENT WANT TO STOP DRINKING: NO

## 2023-08-01 ASSESSMENT — FIBROSIS 4 INDEX: FIB4 SCORE: 0.46

## 2023-08-01 NOTE — ASSESSMENT & PLAN NOTE
In setting of coughing fit, recently diagnosed with COPD exacerbation and atypical pneumonia and was on treatment  Likely vasovagal during coughing fit, possible dehydration as opposed hypotensive on scene per EMS  Improved after fluids  We will observe on telemetry  No murmur, had echo less than a year ago, do not see indication for repeat echo

## 2023-08-01 NOTE — DIETARY
NUTRITION SERVICES: BMI - Pt with BMI >40 (=Body mass index is 50.66 kg/m².), Class III obesity. Weight loss counseling not appropriate in acute care setting.     RECOMMEND - If appropriate at DC please refer to outpatient nutrition services for weight management.

## 2023-08-01 NOTE — PROGRESS NOTES
DC instructions reviewed w/ pt , verbalized understanding. PIV dc'd, armband removed.  Meds to bed delivered. Home via taxi w/ voucher.

## 2023-08-01 NOTE — ED TRIAGE NOTES
.  Chief Complaint   Patient presents with    Dizziness    Arm Pain     Pt BIB EMS for above complaint. Per report pt felt dizzy and hit her R arm/shoulder at work today. Pt complain 9/10 pain on her R arm/shoulder. Bruise noted on R hand. Pt was hypotensive w/ EMS 85/49 and now 127/59. Pt was given 300ml of NS PTA. .

## 2023-08-01 NOTE — PROGRESS NOTES
4 Eyes Skin Assessment Completed by JC Bajwa and JC Schwartz.    Head WDL  Ears WDL  Nose WDL  Mouth WDL  Neck WDL  Breast/Chest Redness  Shoulder Blades WDL  Spine WDL  (R) Arm/Elbow/Hand WDL  (L) Arm/Elbow/Hand WDL  Abdomen WDL  Groin WDL  Scrotum/Coccyx/Buttocks WDL  (R) Leg Redness, Non-Blanching, and Edema  (L) Leg Redness, Non-Blanching, and Edema  (R) Heel/Foot/Toe Scab  (L) Heel/Foot/Toe Scab          Devices In Places Tele Box and Blood Pressure Cuff      Interventions In Place NC W/Ear Foams    Possible Skin Injury No    Pictures Uploaded Into Epic N/A  Wound Consult Placed N/A  RN Wound Prevention Protocol Ordered No

## 2023-08-01 NOTE — DISCHARGE PLANNING
Case Management Discharge Planning    Admission Date: 7/31/2023  GMLOS:    ALOS: 0    6-Clicks ADL Score: 20  6-Clicks Mobility Score: 19      Anticipated Discharge Dispo:      DME Needed: No    Action(s) Taken: Patient discussed at ADT rounds with medical team, will follow for further discharge needs. Chart reviewed. No further needs at this time. Came from a group home called [facility].      Escalations Completed: None    Medically Clear: No    Next Steps: Follow for discharge needs.    Barriers to Discharge: Medical clearance    Is the patient up for discharge tomorrow: No

## 2023-08-01 NOTE — DISCHARGE INSTRUCTIONS
Please follow up with your primary care regarding feelings of lightheadedness.  Follow up with orthopedic surgery outpatient for right radial head nondisplaced fracture, no casting needed, no restriction for work, use arm as tolerated.       Dizziness  Dizziness is a common problem. It makes you feel unsteady or light-headed. You may feel like you are about to pass out (faint). Dizziness can lead to getting hurt if you stumble or fall. Dizziness can be caused by many things, including:  Medicines.  Not having enough water in your body (dehydration).  Illness.  Follow these instructions at home:  Eating and drinking    Drink enough fluid to keep your pee (urine) pale yellow. This helps to keep you from getting dehydrated. Try to drink more clear fluids, such as water.  Do not drink alcohol.  Limit how much caffeine you drink or eat, if your doctor tells you to do that.  Limit how much salt (sodium) you drink or eat, if your doctor tells you to do that.  Activity    Avoid making quick movements.  Stand up slowly from sitting in a chair, and steady yourself until you feel okay.  In the morning, first sit up on the side of the bed. When you feel okay, stand up slowly while you hold onto something. Do this until you know that your balance is okay.  If you need to  one place for a long time, move your legs often. Tighten and relax the muscles in your legs while you are standing.  Do not drive or use machinery if you feel dizzy.  Avoid bending down if you feel dizzy. Place items in your home so you can reach them easily without leaning over.  Lifestyle  Do not smoke or use any products that contain nicotine or tobacco. If you need help quitting, ask your doctor.  Try to lower your stress level. You can do this by using methods such as yoga or meditation. Talk with your doctor if you need help.  General instructions  Watch your dizziness for any changes.  Take over-the-counter and prescription medicines only as told  by your doctor. Talk with your doctor if you think that you are dizzy because of a medicine that you are taking.  Tell a friend or a family member that you are feeling dizzy. If he or she notices any changes in your behavior, have this person call your doctor.  Keep all follow-up visits.  Contact a doctor if:  Your dizziness does not go away.  Your dizziness or light-headedness gets worse.  You feel like you may vomit (are nauseous).  You have trouble hearing.  You have new symptoms.  You are unsteady on your feet.  You feel like the room is spinning.  You have neck pain or a stiff neck.  You have a fever.  Get help right away if:  You vomit or have watery poop (diarrhea), and you cannot eat or drink anything.  You have trouble:  Talking.  Walking.  Swallowing.  Using your arms, hands, or legs.  You feel generally weak.  You are not thinking clearly, or you have trouble forming sentences. A friend or family member may notice this.  You have:  Chest pain.  Pain in your belly (abdomen).  Shortness of breath.  Sweating.  Your vision changes.  You are bleeding.  You have a very bad headache.  These symptoms may be an emergency. Get help right away. Call your local emergency services (911 in the [place]  Do not wait to see if the symptoms will go away.  Do not drive yourself to the hospital.  Summary  Dizziness makes you feel unsteady or light-headed. You may feel like you are about to pass out (faint).  Drink enough fluid to keep your pee (urine) pale yellow. Do not drink alcohol.  Avoid making quick movements if you feel dizzy.  Watch your dizziness for any changes.  This information is not intended to replace advice given to you by your health care provider. Make sure you discuss any questions you have with your health care provider.  Document Revised: 11/22/2021 Document Reviewed: 11/22/2021  Elsevier Patient Education © 2023 [employer]

## 2023-08-01 NOTE — DISCHARGE SUMMARY
[facility] Discharge Summary    Attending: Renate Spring M.d.  Senior Resident: Dr. Jacquie Molina MD  Intern:  Dr. Albino Bennett DO  Contact Number: [phone]    CHIEF COMPLAINT ON ADMISSION  Chief Complaint   Patient presents with    Dizziness    Arm Pain       Reason for Admission  EMS     Admission Date  7/31/2023    Discharge Date  08/01/23     CODE STATUS  Full Code    HPI & HOSPITAL COURSE  Lorene Haro is a 47 y.o. female who presented 7/31/2023 with presyncope. She has a PMHx  obesity, obesity hypoventilation possible COPD on 2L baseline O2, paroxysmal A-fib with ILR not on anticoagulation, hypertension, schizophrenia, GERD.  She was recently seen in the ED 1 week ago and treated for COPD exacerbation and atypical pneumonia, completed treatment, though while at work on 7/30/23 she became lightheaded and fell to her side hurting her shoulder  while coughing severely. She was treated with ceftriaxone and Solu-Medrol in the ED, had wheezing on exam and was given nebulizers as well. She was found to have a right nondisplaced radial head fracture as a result of her fall but no evidence of new or ongoing pneumonia and EKG was without evidence of arrythmia. She was admitted for evaluation of her episode, however workup was negative for cardiac, orthostatic, pulmonary, neuro causes of her episode. It was determined that she likely experienced a vasovagal episode due to her coughing spell. Given that she felt back to her baseline there was no further indication for hospitalization. Per orthopedics it was recommended to use a sling for the fracture with range of motion as tolerated (no casting) with outpatient follow up.     Therefore, she is discharged in fair and stable condition to home with close outpatient follow-up.    The patient recovered much more quickly than anticipated on admission.      Physical Exam on Day of Discharge  Constitutional:       General: She is not in acute distress.      Appearance: She is obese. She is not toxic-appearing.   HENT:      Head: Normocephalic and atraumatic.      Nose: Nose normal.      Mouth/Throat:      Pharynx: Oropharynx is clear.   Eyes:      Extraocular Movements: Extraocular movements intact.      Conjunctiva/sclera: Conjunctivae normal.   Cardiovascular:      Rate and Rhythm: Normal rate and regular rhythm.      Pulses: Normal pulses.      Heart sounds: Normal heart sounds.   Pulmonary:      Effort: Pulmonary effort is normal.      Breath sounds: No wheezing. No rales.      Breathing on room air sitting up in chair  Abdominal:      General: Abdomen is flat.      Tenderness: There is no abdominal tenderness. There is no guarding.   Musculoskeletal:         General: No swelling, deformity or signs of injury. Normal range of motion.      Cervical back: Normal range of motion and neck supple.   Skin:     General: Skin is warm and dry.      Capillary Refill: Capillary refill takes less than 2 seconds.   Neurological:      General: No focal deficit present.      Mental Status: She is alert and oriented to person, place, and time.     FOLLOW UP ITEMS POST DISCHARGE  #Nondisplaced right radial fracture  -Sling provided  -Outpatient orthopedics follow up (referral placed)    DISCHARGE DIAGNOSES  Principal Problem:    Postural dizziness with presyncope (POA: Yes)  Active Problems:    Leukocytosis (POA: Yes)    Atypical pneumonia (POA: Unknown)    Chronic obstructive pulmonary disease with acute exacerbation (HCC) (POA: Unknown)    Elevated troponin (POA: Yes)  Resolved Problems:    * No resolved hospital problems. *      FOLLOW UP  Future Appointments   Date Time Provider Department Center   8/14/2023  3:45 PM [facility] EXAM 4 [facility] None   10/19/2023  2:40 PM Oz Borrego M.D. [facility] None     Heriberto Sharp M.D.  [address] [facility] [address]  [phone]    Schedule an appointment as soon as possible for a visit in 1 week(s)        MEDICATIONS ON DISCHARGE     Medication  List        CONTINUE taking these medications        Instructions   aripiprazole 30 MG tablet  Commonly known as: Abilify   Take 1 Tablet by mouth every morning. Indications: Major Depressive Disorder  Dose: 30 mg     atorvastatin 20 MG Tabs  Commonly known as: Lipitor   Take 1 Tablet by mouth at bedtime.  Dose: 20 mg     famotidine 20 MG Tabs  Commonly known as: Pepcid   Take 1 Tablet by mouth 2 times a day as needed (pain). For upper belly pain  Dose: 20 mg     furosemide 20 MG Tabs  Commonly known as: Lasix   Take 1 Tablet by mouth every day.  Dose: 20 mg     methocarbamol 750 MG Tabs  Commonly known as: Robaxin   Take 2 Tablets by mouth 3 times a day.  Dose: 1,500 mg     metoprolol SR 25 MG Tb24  Commonly known as: Toprol XL   Take 25 mg by mouth every day.  Dose: 25 mg     montelukast 10 MG Tabs  Commonly known as: Singulair   Take 1 Tablet by mouth every evening.  Dose: 10 mg     Ventolin  (90 Base) MCG/ACT Aers inhalation aerosol  Generic drug: albuterol   Inhale 2 Puffs every 6 hours as needed for Shortness of Breath.  Dose: 2 Puff            STOP taking these medications      azithromycin 250 MG Tabs  Commonly known as: Zithromax     clindamycin 150 MG Caps  Commonly known as: Cleocin     Eliquis 5mg Tabs  Generic drug: apixaban     ibuprofen 800 MG Tabs  Commonly known as: Motrin     naproxen 375 MG Tabs  Commonly known as: Naprosyn     nicotine 14 MG/24HR Pt24  Commonly known as: Nicoderm     ondansetron 4 MG Tbdp  Commonly known as: Zofran ODT     potassium chloride 20 MEQ Pack  Commonly known as: Klor-Con     predniSONE 20 MG Tabs  Commonly known as: Deltasone              Allergies  Allergies   Allergen Reactions    Penicillin G Rash and Itching     pt reports that she gets a rash all over her body and gets itchy    Amoxicillin Rash and Itching     Tolerates cephalosporins, pt reports that she gets a rash all over her body and gets itchy       DIET  Orders Placed This Encounter   Procedures     Diet Order Diet: Regular     Standing Status:   Standing     Number of Occurrences:   1     Order Specific Question:   Diet:     Answer:   Regular [1]       ACTIVITY  As tolerated.  Weight bearing as tolerated    CONSULTATIONS  Orthopedics     PROCEDURES  None    LABORATORY  Lab Results   Component Value Date    SODIUM 140 08/01/2023    POTASSIUM 4.8 08/01/2023    CHLORIDE 106 08/01/2023    CO2 26 08/01/2023    GLUCOSE 206 (H) 08/01/2023    BUN 17 08/01/2023    CREATININE 0.88 08/01/2023    CREATININE 0.9 09/11/2008        Lab Results   Component Value Date    WBC 8.7 08/01/2023    HEMOGLOBIN 11.8 (L) 08/01/2023    HEMATOCRIT 39.9 08/01/2023    PLATELETCT 226 08/01/2023        Total time of the discharge process: 45 minutes.

## 2023-08-01 NOTE — ASSESSMENT & PLAN NOTE
Started on treatment with azithromycin on 7/24, completed  Presenting with presyncope after coughing fit  Had leukocytosis however was recently on steroids  No fever, no increase in sputum or purulent sputum, no pneumonia on chest x-ray  Given ceftriaxone in ED, check procalcitonin and reevaluate need for continued antibiotics

## 2023-08-01 NOTE — CARE PLAN
The patient is Stable - Low risk of patient condition declining or worsening    Shift Goals  Clinical Goals: poss dc  Patient Goals: food; comfort  Family Goals: megan    Progress made toward(s) clinical / shift goals:    Problem: Knowledge Deficit - Standard  Goal: Patient and family/care givers will demonstrate understanding of plan of care, disease process/condition, diagnostic tests and medications  Description: Target End Date:  1-3 days or as soon as patient condition allows    Document in Patient Education    1.  Patient and family/caregiver oriented to unit, equipment, visitation policy and means for communicating concern  2.  Complete/review Learning Assessment  3.  Assess knowledge level of disease process/condition, treatment plan, diagnostic tests and medications  4.  Explain disease process/condition, treatment plan, diagnostic tests and medications  Outcome: Met     Problem: Hemodynamics  Goal: Patient's hemodynamics, fluid balance and neurologic status will be stable or improve  Description: Target End Date:  Prior to discharge or change in level of care    Document on Assessment and I/O flowsheet templates    1.  Monitor vital signs, pulse oximetry and cardiac monitor per provider order and/or policy  2.  Maintain blood pressure per provider order  3.  Hemodynamic monitoring per provider order  4.  Manage IV fluids and IV infusions  5.  Monitor intake and output  6.  Daily weights per unit policy or provider order  7.  Assess peripheral pulses and capillary refill  8.  Assess color and body temperature  9.  Position patient for maximum circulation/cardiac output  10. Monitor for signs/symptoms of excessive bleeding  11. Assess mental status, restlessness and changes in level of consciousness  12. Monitor temperature and report fever or hypothermia to provider immediately. Consideration of targeted temperature management.  Outcome: Met       Patient is not progressing towards the following goals:

## 2023-08-01 NOTE — ASSESSMENT & PLAN NOTE
38 trended down to 37  No syndrome of ACS  Not significantly elevated above baseline  Watching on telemetry due to presyncope

## 2023-08-01 NOTE — ED NOTES
Assisted pt via wheelchair to bathroom without incident. Placed pt back on monitor and put bedside rails back up.

## 2023-08-01 NOTE — RESPIRATORY CARE
"  COPD EDUCATION by COPD CLINICAL EDUCATOR  8/1/2023  at  10:43 AM by Milton Howell, RRT     2021 PFT FEV1 48%; ratio 86.  Quit smoking 10/2022. Reviewed RT medications and updated action plan. Declines COPD materials at this time.       COPD Assessment  COPD Clinical Specialists ONLY  COPD Education Initiated: Yes--Short Intervention (2021 PFT FEV1 48%; ratio 86.  Quit smoking 10/2022.)  Is this a COPD exacerbation patient?: No (Dizziness with presyncope)  DME Company: unknown  DME Equipment Type: oxygen 2 lpm @ night  Physician Name: Heriberto Sharp M.D.  Pulmonologist Name: [facility]-last seen 1/2023  $ Demo/Eval of SVN's, MDI's and Aerosols: Yes (Reviewed RT medications)  (OP) Pulmonary Function Testing: Yes ()  Interdisciplinary Rounds: Attendance at Rounds (30 Min)    PFT Results    2021 PFT FEV1 48%; ratio 86.    Meds to Beds  Would the patient like to opt in for Bedside Medication Delivery at Discharge?: Yes, interested     MY COPD ACTION PLAN     It is recommended that patients and physicians /healthcare providers complete this action plan together. This plan should be discussed at each physician visit and updated as needed.    The green, yellow and red zones show groups of symptoms of COPD. This list of symptoms is not comprehensive, and you may experience other symptoms. In the \"Actions\" column, your healthcare provider has recommended actions for you to take based on your symptoms.    Patient Name: Lorene Haro   YOB: 1975   Last Updated on: 8/1/2023 10:43 AM   Green Zone:  I am doing well today Actions     Usual activitiy and exercise level   Take daily medications     Usual amounts of cough and phlegm/mucus   Use oxygen as prescribed     Sleep well at night   Continue regular exercise/diet plan     Appetite is good   At all times avoid cigarette smoke, inhaled irritants     Daily Medications (these medications are taken every day):   Fluticosone/Salmeterol (Advair) 1 Puff " "Twice daily     Additional Information:  Rinse mouth and spit after using Advair    Yellow Zone:  I am having a bad day or a COPD flare Actions     More breathless than usual   Continue daily medications     I have less energy for my daily activities   Use quick relief inhaler as ordered     Increased or thicker phlegm/mucus   Use oxygen as prescribed     Using quick relief inhaler/nebulizer more often   Get plenty of rest     Swelling of ankles more than usual   Use pursed lip breathing     More coughing than usual   At all times avoid cigarette smoke, inhaled irritants     I feel like I have a \"chest cold\"     Poor sleep and my symptoms woke me up     My appetite is not good     My medicine is not helping      Call provider immediately if symptoms don’t improve     Continue daily medications, add rescue medications:   Albuterol 2 Puffs Every 6 hours PRN       Medications to be used during a flare up, (as Discussed with Provider):           Additional Information:  Use spacer with rescue inhaler    Red Zone:  I need urgent medical care Actions     Severe shortness of breath even at rest   Call 911 or seek medical care immediately     Not able to do any activity because of breathing      Fever or shaking chills      Feeling confused or very drowsy       Chest pains      Coughing up blood                  "

## 2023-08-01 NOTE — ASSESSMENT & PLAN NOTE
Started on treatment on 7/24 for exacerbation and atypical pneumonia  PFTs in 2021 showed mixed restrictive/obstructive with bronchodilator response  After completing treatment had coughing fit with presyncope  Some wheezing on exam, improved after DuoNebs  Given Solu-Medrol in ED, reevaluate need for continued steroids  RT protocol, breathing treatments

## 2023-08-01 NOTE — ED PROVIDER NOTES
ED Provider Note    CHIEF COMPLAINT  Chief Complaint   Patient presents with    Dizziness    Arm Pain       EXTERNAL RECORDS REVIEWED  Outpatient Notes patient seen in the emergency department for evaluation of shortness of breath and a cough productive of yellow sputum on 7/24/2023.  She was diagnosed with COPD exacerbation and discharged on azithromycin and prednisone.    HPI/ROS  LIMITATION TO HISTORY   Select: : None      Lorene Haro is a 47 y.o. female who presents to the emergency department for evaluation of an episode of lightheadedness and near syncope with fall.  Patient reports that she was recently diagnosed with walking pneumonia.  She states that since leaving the hospital she has had worsening of her cough and shortness of breath.  She states that today during a coughing spell she became very lightheaded and fell landing on her right shoulder.  She denies hitting her head or fully losing consciousness.  She called paramedics and on their arrival the patient was found to be hypotensive with a systolic blood pressure in the 80s.  She received IV fluids which improved her pressure by the time of arrival.    Currently she reports feeling slightly short of breath.  She denies chest pain or pressure.  She also reports pain in her right shoulder, elbow and wrist after her fall.  She denies striking her head or losing consciousness nor any pain in her neck, back, chest or abdomen.    PAST MEDICAL HISTORY   has a past medical history of A-fib ([facility]), Asthma, Bipolar 2 disorder (HCC), Chickenpox, Chronic obstructive pulmonary disease (HCC), Hypertension, On home oxygen therapy, Other psychological stress, Schizophrenic disorder (HCC), and Yeast infection of the skin (04/01/2021).    SURGICAL HISTORY   has a past surgical history that includes hysterectomy laparoscopy; colectomy; cholecystectomy; and knee arthroscopy (Left).    FAMILY HISTORY  Family History   Problem Relation Age of Onset    No Known  "Problems Mother     Cancer Sister        SOCIAL HISTORY  Social History     Tobacco Use    Smoking status: Former     Packs/day: 0.25     Types: Cigarettes     Quit date: 10/12/2022     Years since quittin.8    Smokeless tobacco: Never   Vaping Use    Vaping Use: Never used   Substance and Sexual Activity    Alcohol use: Not Currently    Drug use: Never    Sexual activity: Not Currently       CURRENT MEDICATIONS  Home Medications       Reviewed by Robbie Garcia (Pharmacy Tech) on 23 at 0103  Med List Status: Complete     Medication Last Dose Status   aripiprazole (ABILIFY) 30 MG tablet UNK Active   atorvastatin (LIPITOR) 20 MG Tab UNK Active   azithromycin (ZITHROMAX) 250 MG Tab UNK Active   clindamycin (CLEOCIN) 150 MG Cap UNK Active   ELIQUIS 5 MG Tab UNK Active   famotidine (PEPCID) 20 MG Tab UNK Active   furosemide (LASIX) 20 MG Tab UNK Active   ibuprofen (MOTRIN) 800 MG Tab UNK Active   methocarbamol (ROBAXIN) 750 MG Tab UNK Active   metoprolol SR (TOPROL XL) 25 MG TABLET SR 24 HR UNK Active   montelukast (SINGULAIR) 10 MG Tab UNK Active   naproxen (NAPROSYN) 375 MG Tab UNK Active   nicotine (NICODERM) 14 MG/24HR PATCH 24 HR UNK Active   ondansetron (ZOFRAN ODT) 4 MG TABLET DISPERSIBLE UNK Active   potassium chloride (KLOR-CON) 20 MEQ Pack UNK Active   predniSONE (DELTASONE) 20 MG Tab UNK Active                    ALLERGIES  Allergies   Allergen Reactions    Penicillin G Rash and Itching     pt reports that she gets a rash all over her body and gets itchy    Amoxicillin Rash and Itching     Tolerates cephalosporins, pt reports that she gets a rash all over her body and gets itchy       PHYSICAL EXAM  VITAL SIGNS: /57   Pulse 67   Temp 36.1 °C (97 °F) (Temporal)   Resp 20   Ht 1.549 m (5' 0.98\")   Wt 122 kg (268 lb)   LMP  (LMP Unknown)   SpO2 97%   BMI 50.66 kg/m²    Constitutional: No acute distress  HEENT: Atraumatic, normocephalic, pupils are equal round reactive to light, nose " normal, mouth shows moist mucous membranes  Neck: Supple, no JVD, no tracheal deviation  Cardiovascular: Regular rate and rhythm, no murmur, rub or gallop, 2+ pulses peripherally x4  Thorax & Lungs: No respiratory distress, diffuse wheezes no rales or rhonchi, no chest wall tenderness.  GI: Soft, non-distended, non-tender, no rebound  Skin: Warm, dry, no acute rash or lesion  Musculoskeletal: Tenderness with palpation of the anterior right shoulder, right elbow and right wrist.  No tenderness with axial loading of the thumb or tenderness at the anatomic snuffbox  Neurologic: A&Ox3, at baseline mentation, cranial nerves II through XII are grossly intact, no sensory deficit, no ataxia  Psychiatric: Appropriate affect for situation at this time      DIAGNOSTIC STUDIES / PROCEDURES  EKG  I have independently interpreted this EKG  Results for orders placed or performed during the hospital encounter of 23   EKG   Result Value Ref Range    Report       [facility]    Test Date:  2023  Pt Name:    ADELINA MILLAN                Department: ER  MRN:        [id number]                      Room:       [facility]  Gender:     Female                       Technician: [id number]  :        1975                   Requested By:MALORIE GARCIA  Order #:    [id number]                    Reading MD: Malorie Garcia    Measurements  Intervals                                Axis  Rate:       66                           P:          27  NE:         124                          QRS:        44  QRSD:       101                          T:          10  QT:         375  QTc:        393    Interpretive Statements  EKG interpretation: Normal sinus rhythm at a rate of 66, normal axis, normal  intervals, no ST elevation or depression, T wave inversion in lead III  unchanged from prior EKG, biatrial abnormality.  No evidence of acute  ischemia.  Electronically Signed On 2023 04:17:16 PDT by Malorie  Radha     EKG   Result Value Ref Range    Report       [facility].    Test Date:  2023  Pt Name:    ADELINA MILLAN                Department: ER  MRN:        [id number]                      Room:       RD 08  Gender:     Female                       Technician: [id number]  :        1975                   Requested By:MALORIE DELGADILLO  Order #:    [id number]                    Reading MD:    Measurements  Intervals                                Axis  Rate:       72                           P:          60  CO:         128                          QRS:        68  QRSD:       101                          T:          21  QT:         385  QTc:        422    Interpretive Statements  Sinus rhythm  Low voltage, precordial leads  Compared to ECG 2023 22:10:29  Right ventricular hypertrophy no longer present     Repeat EKG interpretation sinus rhythm at a rate of 72, normal axis, normal intervals, no ST elevation or depression, T wave inversion in lead III.  No change from prior EKG.      LABS  Labs Reviewed   CBC WITH DIFFERENTIAL - Abnormal; Notable for the following components:       Result Value    WBC 13.7 (*)     RBC 4.07 (*)     Hemoglobin 11.0 (*)     Hematocrit 35.6 (*)     MCHC 30.9 (*)     Neutrophils-Polys 74.50 (*)     Lymphocytes 18.60 (*)     Neutrophils (Absolute) 10.16 (*)     All other components within normal limits    Narrative:     Biotin intake of greater than 5 mg per day may interfere with  troponin levels, causing false low values.   COMP METABOLIC PANEL - Abnormal; Notable for the following components:    Glucose 164 (*)     AST(SGOT) 9 (*)     Alkaline Phosphatase 114 (*)     All other components within normal limits    Narrative:     Biotin intake of greater than 5 mg per day may interfere with  troponin levels, causing false low values.   TROPONIN - Abnormal; Notable for the following components:    Troponin T 38 (*)     All other components within  normal limits    Narrative:     Biotin intake of greater than 5 mg per day may interfere with  troponin levels, causing false low values.   PROBRAIN NATRIURETIC PEPTIDE, NT - Abnormal; Notable for the following components:    NT-proBNP 184 (*)     All other components within normal limits    Narrative:     Biotin intake of greater than 5 mg per day may interfere with  troponin levels, causing false low values.   TROPONIN - Abnormal; Notable for the following components:    Troponin T 37 (*)     All other components within normal limits    Narrative:     Biotin intake of greater than 5 mg per day may interfere with  troponin levels, causing false low values.   D-DIMER    Narrative:     Biotin intake of greater than 5 mg per day may interfere with  troponin levels, causing false low values.   ESTIMATED GFR    Narrative:     Biotin intake of greater than 5 mg per day may interfere with  troponin levels, causing false low values.   PROCALCITONIN    Narrative:     Biotin intake of greater than 5 mg per day may interfere with  troponin levels, causing false low values.         RADIOLOGY  I have independently interpreted the diagnostic imaging associated with this visit and am waiting the final reading from the radiologist.   My preliminary interpretation is as follows:   Chest x-ray with cardiomegaly and questionable left lower lobe infiltrate    Radiologist interpretation:   DX-CHEST-PORTABLE (1 VIEW)   Final Result      1.  Cardiomegaly. No overt failure or pneumonia.      DX-SHOULDER 2+ RIGHT   Final Result      No radiographic evidence of acute traumatic injury.      DX-ELBOW-COMPLETE 3+ RIGHT   Final Result      Questionable nondisplaced radial head fracture      DX-HAND 3+ RIGHT   Final Result      No radiographic evidence of acute traumatic injury. If symptoms persist, follow-up radiographs can be obtained in 7-10 days.            COURSE & MEDICAL DECISION MAKING    ED Observation Status? No; Patient does not meet  criteria for ED Observation.     INITIAL ASSESSMENT, COURSE AND PLAN  Care Narrative:     Patient seen in the emergency department for evaluation after a syncopal event and with prehospital hypotension.  Patient recently treated in the emergency department for COPD exacerbation and atypical pneumonia with worsening symptoms per her.  On arrival patient tachypneic and diffusely wheezy.  Vital stable.  Prehospital hypotension noted by paramedics improved with IV fluids.  Continuing IV fluid bolus in the emergency department.  Differential includes COPD exacerbation, worsening pneumonia, orthostatic hypotension, arrhythmia, ACS, PE.  Patient treated with DuoNeb and Solu-Medrol for COPD exacerbation with improvement in her respiratory status.  Chest x-ray by my interpretation with questionable left lower lobe infiltrate and given her worsening leukocytosis, albeit likely related to steroid use outpatient, as well as worsening symptoms elected to treat for worsening community-acquired pneumonia with ceftriaxone and azithromycin.  Work-up remarkable for anemia which is at patient's baseline, no significant BNP elevation suggestive of new onset heart failure.  D-dimer negative in the setting of low clinical concern for PE effectively ruled out this diagnosis as patient is PERC negative.  Troponin elevated slightly above patient's baseline though stable on serial repeats with serial nonischemic EKGs.  Low concern for ACS at this point.  X-rays interpreted by me with no acute bony abnormality.  Initial radiology read with no bony abnormality.  Suspect bony contusion.  I do not feel comfortable discharging the patient given her syncopal event in the setting of prehospital hypotension particularly in the setting of worsening symptoms of COPD exacerbation despite outpatient treatment.  Ultimately case was discussed with the admitting hospitalist Dr. Tellez.  I appreciate his assistance in the care of this  patient.    HYDRATION: Based on the patient's presentation of Hypotension the patient was given IV fluids. IV Hydration was used because oral hydration was not adequate alone. Upon recheck following hydration, the patient was improved blood pressure.      ADDITIONAL PROBLEM LIST  COPD, elevated troponin, anemia    DISPOSITION AND DISCUSSIONS  I have discussed management of the patient with the following physicians and JAY's: Dr. Tellez Hospitalist    Discussion of management with other Q or appropriate source(s): None     Decision tools and prescription drugs considered including, but not limited to: PERC rule negative .    FINAL DIAGNOSIS  1. Pneumonia of left lower lobe due to infectious organism    2. Lightheadedness    3. Right arm pain    4. Fall, initial encounter           Electronically signed by: Hiram Garcia M.D., 7/31/2023 10:12 PM

## 2023-08-01 NOTE — ED NOTES
Med Rec complete per patient's dispense records from [facility]  Pt from [facility], no MAR sent with pt.

## 2023-08-01 NOTE — H&P
Hospital Medicine History & Physical Note    Date of Service  8/1/2023    Primary Care Physician  Heriberto Sharp M.D.      Code Status  Full Code    Chief Complaint  Chief Complaint   Patient presents with    Dizziness    Arm Pain       History of Presenting Illness  Lorene Haro is a 47 y.o. female who presented 7/31/2023 with presyncope.  Pleasant woman with obesity, obesity hypoventilation possible COPD, paroxysmal A-fib does not appear to be on anticoagulation, hypertension, schizophrenia, GERD.  She was recently seen in the ED 1 week ago and treated for COPD exacerbation and atypical pneumonia, completed treatment, yesterday while at work she became lightheaded and fell to her side hurting her shoulder.  X-rays negative for any fracture or dislocation.  She did have a new leukocytosis however recently completed course of steroids.  She was hypotensive on the scene and received fluids in route to the ED and was normotensive on arrival here.  She was treated with ceftriaxone and Solu-Medrol in the ED, had wheezing on exam and was given nebulizers as well.  At time of my interview she reports feeling improved though not quite back to baseline.  Admission was requested for observation given presyncope and hypotension, considering further treatment for COPD and pneumonia.    I discussed the plan of care with patient.    Review of Systems  Review of Systems   Constitutional:  Positive for malaise/fatigue. Negative for chills and fever.   HENT:  Negative for congestion and sore throat.    Eyes:  Negative for blurred vision and double vision.   Respiratory:  Positive for cough, shortness of breath and wheezing. Negative for sputum production.    Cardiovascular:  Negative for chest pain and palpitations.   Gastrointestinal:  Negative for abdominal pain, heartburn, nausea and vomiting.   Genitourinary:  Negative for dysuria and urgency.   Musculoskeletal:  Negative for back pain and myalgias.   Neurological:   Negative for dizziness and headaches.       Past Medical History   has a past medical history of A-fib (HCC), Asthma, Bipolar 2 disorder (HCC), Chickenpox, Chronic obstructive pulmonary disease (HCC), Hypertension, On home oxygen therapy, Other psychological stress, Schizophrenic disorder (HCC), and Yeast infection of the skin (04/01/2021).    Surgical History   has a past surgical history that includes hysterectomy laparoscopy; colectomy; cholecystectomy; and knee arthroscopy (Left).     Family History  family history includes Cancer in her sister; No Known Problems in her mother.   Family history reviewed with patient. There is no family history that is pertinent to the chief complaint.     Social History   reports that she quit smoking about 9 months ago. Her smoking use included cigarettes. She smoked an average of .25 packs per day. She has never used smokeless tobacco. She reports that she does not currently use alcohol. She reports that she does not use drugs.    Allergies  Allergies   Allergen Reactions    Penicillin G Rash and Itching     pt reports that she gets a rash all over her body and gets itchy    Amoxicillin Rash and Itching     Tolerates cephalosporins, pt reports that she gets a rash all over her body and gets itchy       Medications  Prior to Admission Medications   Prescriptions Last Dose Informant Patient Reported? Taking?   ADVAIR DISKUS 100-50 MCG/ACT AEROSOL POWDER, BREATH ACTIVATED   No No   Sig: INHALE 1 PUFF BY MOUTH EVERY 12 HOURS   albuterol 108 (90 Base) MCG/ACT Aero Soln inhalation aerosol   No No   Sig: Inhale 2 Puffs every 6 hours as needed for Shortness of Breath.   aripiprazole (ABILIFY) 30 MG tablet   No No   Sig: Take 1 Tablet by mouth every morning. Indications: Major Depressive Disorder   aspirin 81 MG EC tablet   Yes No   Sig: Take 81 mg by mouth every day.   atorvastatin (LIPITOR) 20 MG Tab   No No   Sig: Take 1 Tablet by mouth at bedtime.   famotidine (PEPCID) 20 MG Tab    No No   Sig: Take 1 Tablet by mouth 2 times a day as needed (pain). For upper belly pain   fluticasone (FLONASE) 50 MCG/ACT nasal spray   Yes No   furosemide (LASIX) 20 MG Tab   No No   Sig: Take 1 Tablet by mouth every day.   methocarbamol (ROBAXIN) 750 MG Tab   No No   Sig: Take 2 Tablets by mouth 3 times a day.   metoprolol SR (TOPROL XL) 25 MG TABLET SR 24 HR   No No   Sig: Take 0.5 Tablets by mouth every day.   montelukast (SINGULAIR) 10 MG Tab   No No   Sig: Take 1 Tablet by mouth every evening.   ondansetron (ZOFRAN ODT) 4 MG TABLET DISPERSIBLE   No No   Sig: Take 1 Tablet by mouth every 6 hours as needed for Nausea/Vomiting.   potassium chloride (KLOR-CON) 20 MEQ Pack   No No   Sig: Take 1 Packet by mouth every day.   Patient not taking: Reported on 6/13/2023   sennosides (SENOKOT) 8.6 MG Tab   No No   Sig: Take 1 Tablet by mouth 1 time a day as needed (constipation).   traZODone (DESYREL) 50 MG Tab  Patient No No   Sig: Take 1 Tablet by mouth at bedtime.      Facility-Administered Medications: None       Physical Exam  Temp:  [36.6 °C (97.9 °F)] 36.6 °C (97.9 °F)  Pulse:  [64-76] 69  Resp:  [17-30] 17  BP: (105-127)/(42-59) 117/59  SpO2:  [92 %-94 %] 92 %  Blood Pressure: 117/59   Temperature: 36.6 °C (97.9 °F)   Pulse: 69   Respiration: 17   Pulse Oximetry: 92 %       Physical Exam  Constitutional:       General: She is not in acute distress.     Appearance: She is obese. She is not toxic-appearing.   HENT:      Head: Normocephalic and atraumatic.      Nose: Nose normal.      Mouth/Throat:      Mouth: Mucous membranes are dry.      Pharynx: Oropharynx is clear.   Eyes:      Extraocular Movements: Extraocular movements intact.      Conjunctiva/sclera: Conjunctivae normal.   Cardiovascular:      Rate and Rhythm: Normal rate and regular rhythm.      Pulses: Normal pulses.      Heart sounds: Normal heart sounds.   Pulmonary:      Effort: Pulmonary effort is normal.      Breath sounds: Wheezing present. No  rales.   Abdominal:      General: Abdomen is flat.      Tenderness: There is no abdominal tenderness. There is no guarding.   Musculoskeletal:         General: No swelling, deformity or signs of injury. Normal range of motion.      Cervical back: Normal range of motion and neck supple.   Skin:     General: Skin is warm and dry.      Capillary Refill: Capillary refill takes less than 2 seconds.   Neurological:      General: No focal deficit present.      Mental Status: She is alert and oriented to person, place, and time.         Laboratory:  Recent Labs     07/31/23 2128   WBC 13.7*   RBC 4.07*   HEMOGLOBIN 11.0*   HEMATOCRIT 35.6*   MCV 87.5   MCH 27.0   MCHC 30.9*   RDW 49.2   PLATELETCT 248   MPV 11.5     Recent Labs     07/31/23 2128   SODIUM 141   POTASSIUM 4.4   CHLORIDE 105   CO2 26   GLUCOSE 164*   BUN 19   CREATININE 1.04   CALCIUM 9.4     Recent Labs     07/31/23 2128   ALTSGPT 18   ASTSGOT 9*   ALKPHOSPHAT 114*   TBILIRUBIN 0.4   GLUCOSE 164*         Recent Labs     07/31/23 2128   NTPROBNP 184*         Recent Labs     07/31/23 2128 07/31/23  2335   TROPONINT 38* 37*       Imaging:  DX-CHEST-PORTABLE (1 VIEW)   Final Result      1.  Cardiomegaly. No overt failure or pneumonia.      DX-SHOULDER 2+ RIGHT   Final Result      No radiographic evidence of acute traumatic injury.      DX-ELBOW-COMPLETE 3+ RIGHT   Final Result      Questionable nondisplaced radial head fracture      DX-HAND 3+ RIGHT   Final Result      No radiographic evidence of acute traumatic injury. If symptoms persist, follow-up radiographs can be obtained in 7-10 days.          EKG:  I have personally reviewed the images and compared with prior images. and My impression is: Normal sinus rhythm with ventricular rate 72 no ST elevations, QTc 422    Assessment/Plan:  Justification for Admission Status  I anticipate this patient is appropriate for observation status at this time because presyncope, atypical pneumonia, COPD  exacerbation    Patient will need a Telemetry bed on MEDICAL service .  The need is secondary to above.    * Postural dizziness with presyncope- (present on admission)  Assessment & Plan  In setting of coughing fit, recently diagnosed with COPD exacerbation and atypical pneumonia and was on treatment  Likely vasovagal during coughing fit, possible dehydration as opposed hypotensive on scene per EMS  Improved after fluids  We will observe on telemetry  No murmur, had echo less than a year ago, do not see indication for repeat echo    Elevated troponin- (present on admission)  Assessment & Plan  38 trended down to 37  No syndrome of ACS  Not significantly elevated above baseline  Watching on telemetry due to presyncope    Chronic obstructive pulmonary disease with acute exacerbation (HCC)  Assessment & Plan  Started on treatment on 7/24 for exacerbation and atypical pneumonia  PFTs in 2021 showed mixed restrictive/obstructive with bronchodilator response  After completing treatment had coughing fit with presyncope  Some wheezing on exam, improved after DuoNebs  Given Solu-Medrol in ED, reevaluate need for continued steroids  RT protocol, breathing treatments    Atypical pneumonia  Assessment & Plan  Started on treatment with azithromycin on 7/24, completed  Presenting with presyncope after coughing fit  Had leukocytosis however was recently on steroids  No fever, no increase in sputum or purulent sputum, no pneumonia on chest x-ray  Given ceftriaxone in ED, check procalcitonin and reevaluate need for continued antibiotics    Leukocytosis- (present on admission)  Assessment & Plan  Following steroid treatment        VTE prophylaxis: SCDs/TEDs and enoxaparin ppx

## 2023-08-01 NOTE — CARE PLAN
The patient is Stable - Low risk of patient condition declining or worsening    Problem: Knowledge Deficit - Standard  Goal: Patient and family/care givers will demonstrate understanding of plan of care, disease process/condition, diagnostic tests and medications  Outcome: Progressing  Note: Plan of care reviewed with patient.  Patient oriented to unit and room.  Patient oriented to visitation policy.     Problem: Hemodynamics  Goal: Patient's hemodynamics, fluid balance and neurologic status will be stable or improve  Outcome: Progressing  Note: Patients vital signs wnl.  Continued tele monitoring.  Patients neurological status wnl.

## 2023-08-02 NOTE — PROGRESS NOTES
"This note is intended for the purposes of medical student education and feedback only.   Please refer to the documentation by this patient's assigned medical practitioner for details of care and plans.    Medical Student Progress Note  Note Author: Mandy Osman, Student  Date: 8/1/2023    Date of Admission: 7/31/2023  Primary Team: Blue  Attending: Dr. Spring  Senior Resident: Dr. Molina  Medical Student: Nubia Osman, MS3    ID/CC: Lorene Haro is a 47 y.o. female with a PMH of Asthma, COPD, Paroxysmal Atrial Fibrillation, and a recent diagnosis of atypical pneumonia who was admitted on 7/31/2023 with postural dizziness with syncope.    INTERVAL UPDATE FOR 8/1/2023  Patient reports continued SOB, productive cough, and pain in her right arm from the fall, but denies any continued dizziness. Discharged home.     Review of Systems  Unable to perform complete review of systems today.      Previous Interval Events  Pt was seen in the ED 1 week ago and treated for COPD exacerbation and atypical pneumonia and completed treatment. She presented again to the ED on 7/31/2023 after an episode of dizziness and syncope while she was at work leading to an injury of her right arm. She was hypotensive at the scene and received a bolus of fluid, leading to an improvement of symptoms.  She reported SOB, productive cough, wheezing, and chest tightness in the ED. Wheezing improved with nebulizer treatment. Troponin was elevated at 37/38 but EKG has no findings of acute ischemia. D-dimer was negative. Chest x-ray showed cardiomegaly but no findings of pneumonia. X-ray of right elbow found questionable nondisplaced radial head fracture.     OBJECTIVE   Physical Exam:  /51   Pulse 74   Temp 36.1 °C (97 °F) (Temporal)   Resp 20   Ht 1.549 m (5' 0.98\")   Wt (!) 129 kg (283 lb 11.7 oz)   SpO2 94%       Unable to perform Physical Exam today    Lab Results:  Recent Labs     07/31/23 2128 08/01/23  0823   WBC 13.7* " 8.7   RBC 4.07* 4.45   HEMOGLOBIN 11.0* 11.8*   HEMATOCRIT 35.6* 39.9   MCV 87.5 89.7   MCH 27.0 26.5*   RDW 49.2 50.9*   PLATELETCT 248 226   MPV 11.5 11.2   NEUTSPOLYS 74.50* 90.50*   LYMPHOCYTES 18.60* 8.00*   MONOCYTES 5.10 0.60   EOSINOPHILS 1.10 0.00   BASOPHILS 0.10 0.10   RBCMORPHOLO  --  Present     Recent Labs     07/31/23 2128 08/01/23  0937   SODIUM 141 140   POTASSIUM 4.4 4.8   CHLORIDE 105 106   CO2 26 26   BUN 19 17   CREATININE 1.04 0.88   CALCIUM 9.4 9.3   MAGNESIUM  --  2.2   PHOSPHORUS  --  2.4*   ALBUMIN 3.8 3.8     Estimated GFR/CRCL = Estimated Creatinine Clearance: 98 mL/min (by C-G formula based on SCr of 0.88 mg/dL).  Recent Labs     07/31/23 2128 08/01/23  0937   GLUCOSE 164* 206*     Recent Labs     07/31/23 2128   ASTSGOT 9*   ALTSGPT 18   TBILIRUBIN 0.4   ALKPHOSPHAT 114*   GLOBULIN 2.7             No results for input(s): INR, APTT, FIBRINOGEN in the last 72 hours.    Invalid input(s): DIMER    Microbiology Results:  Results       Procedure Component Value Units Date/Time    Blood Culture,Hold [[id number]] Collected: 07/31/23 2128    Order Status: Completed Updated: 08/01/23 0637     Blood Culture Hold Collected            Imaging Results:  DX-CHEST-PORTABLE (1 VIEW)   Final Result      1.  Cardiomegaly. No overt failure or pneumonia.      DX-SHOULDER 2+ RIGHT   Final Result      No radiographic evidence of acute traumatic injury.      DX-ELBOW-COMPLETE 3+ RIGHT   Final Result      Questionable nondisplaced radial head fracture      DX-HAND 3+ RIGHT   Final Result      No radiographic evidence of acute traumatic injury. If symptoms persist, follow-up radiographs can be obtained in 7-10 days.          EKG  Results for orders placed or performed during the hospital encounter of 07/31/23   EKG   Result Value Ref Range    Report       [facility] Emergency Dept.    Test Date:  2023-07-31  Pt Name:    ADELINA MILLAN                Department: ER  MRN:        [id number]                       Room:       [facility]  Gender:     Female                       Technician: [id number]  :        1975                   Requested By:MALORIE GARCIA  Order #:    [id number]                    Reading MD: Malorie Garcia    Measurements  Intervals                                Axis  Rate:       66                           P:          27  ND:         124                          QRS:        44  QRSD:       101                          T:          10  QT:         375  QTc:        393    Interpretive Statements  EKG interpretation: Normal sinus rhythm at a rate of 66, normal axis, normal  intervals, no ST elevation or depression, T wave inversion in lead III  unchanged from prior EKG, biatrial abnormality.  No evidence of acute  ischemia.  Electronically Signed On 2023 04:17:16 PDT by Malorie Garcia     EKG   Result Value Ref Range    Report       [facility]    Test Date:  2023  Pt Name:    ADELINA MILLAN                Department: ER  MRN:        [id number]                      Room:        08  Gender:     Female                       Technician: [id number]  :        1975                   Requested By:MALORIE GARCIA  Order #:    [id number]                    Reading MD:    Measurements  Intervals                                Axis  Rate:       72                           P:          60  ND:         128                          QRS:        68  QRSD:       101                          T:          21  QT:         385  QTc:        422    Interpretive Statements  Sinus rhythm  Low voltage, precordial leads  Compared to ECG 2023 22:10:29  Right ventricular hypertrophy no longer present         Current Medications  No current facility-administered medications for this encounter.    Current Outpatient Medications:     metoprolol SR (TOPROL XL) 25 MG TABLET SR 24 HR, Take 25 mg by mouth every day., Disp: , Rfl:     albuterol 108 (90 Base) MCG/ACT Aero  Soln inhalation aerosol, Inhale 2 Puffs every 6 hours as needed for Shortness of Breath., Disp: 18 g, Rfl: 0    methocarbamol (ROBAXIN) 750 MG Tab, Take 2 Tablets by mouth 3 times a day., Disp: 20 Tablet, Rfl: 0    famotidine (PEPCID) 20 MG Tab, Take 1 Tablet by mouth 2 times a day as needed (pain). For upper belly pain, Disp: 30 Tablet, Rfl: 0    furosemide (LASIX) 20 MG Tab, Take 1 Tablet by mouth every day., Disp: 90 Tablet, Rfl: 0    aripiprazole (ABILIFY) 30 MG tablet, Take 1 Tablet by mouth every morning. Indications: Major Depressive Disorder, Disp: 30 Tablet, Rfl: 0    atorvastatin (LIPITOR) 20 MG Tab, Take 1 Tablet by mouth at bedtime., Disp: 30 Tablet, Rfl: 0    montelukast (SINGULAIR) 10 MG Tab, Take 1 Tablet by mouth every evening., Disp: 30 Tablet, Rfl: 0    ASSESSMENT/PLAN  Lorene Haro is a 47 y.o. female with a PMH of Asthma, COPD, Paroxysmal Atrial Fibrillation, and atypical pneumonia who was admitted on 7/31/2023 with postural dizziness with presyncope. Her problem list includes:    Postural dizziness with presyncope- present on admission  Patient reported recent coughing fit preceding the episode indicating possible cause of a vasovagal event. DDX also includes dehydration and cardiac arrhythmia including atrial fibrillation. EKG's were normal without evidence of arrythmia  Symptoms of dizziness have resolved with no further episodes of syncope      COPD with acute exacerbation  -PFTs in 2021 showed mixed restrictive/obstructive with bronchodilator response  -Started treatment for exacerbation on 7/24/23 and completed prior to episode  Continue albuterol as needed for wheezing     Atypical pneumonia  -Completed treatment prior to episode  -No worsening of respiratory symptoms and no fevers  -Leukocytosis has resolved      Elevated troponin  No further symptoms of ACS  Likely resolved    Leukocytosis  Initial elevation likely related to steroid us  Resolved

## 2023-08-03 ENCOUNTER — PATIENT OUTREACH (OUTPATIENT)
Dept: HEALTH INFORMATION MANAGEMENT | Facility: OTHER | Age: 48
End: 2023-08-03
Payer: MEDICAID

## 2023-08-03 SDOH — ECONOMIC STABILITY: FOOD INSECURITY: WITHIN THE PAST 12 MONTHS, YOU WORRIED THAT YOUR FOOD WOULD RUN OUT BEFORE YOU GOT MONEY TO BUY MORE.: NEVER TRUE

## 2023-08-03 SDOH — ECONOMIC STABILITY: FOOD INSECURITY: WITHIN THE PAST 12 MONTHS, THE FOOD YOU BOUGHT JUST DIDN'T LAST AND YOU DIDN'T HAVE MONEY TO GET MORE.: NEVER TRUE

## 2023-08-03 NOTE — PROGRESS NOTES
CHW Bob re-introduced community care management to the patient.   Pt states that she is doing / feeling well.   Pt declined CCM at this time as she states her group home provides access to housing, transportation, and food.   Pt has a PCP appointment tomorrow 8/4/2023 @ 2pm & an anticoagulation appointment on 8/14/2023 which the group home owner will be providing transportation to.     Community Health Worker Intake    Identified no barriers.  Provided no resources.   Contact information provided to Lorene Haro   Has PCP appointment scheduled for 8/4/2023  Outpatient assessment completed.  Did the patient receive medications post discharge: Yes    Plan: CHW will not follow at this time as she has declined services.

## 2023-08-08 ENCOUNTER — APPOINTMENT (OUTPATIENT)
Dept: RADIOLOGY | Facility: MEDICAL CENTER | Age: 48
End: 2023-08-08
Attending: EMERGENCY MEDICINE
Payer: MEDICAID

## 2023-08-08 ENCOUNTER — HOSPITAL ENCOUNTER (OUTPATIENT)
Facility: MEDICAL CENTER | Age: 48
End: 2023-08-09
Attending: EMERGENCY MEDICINE | Admitting: STUDENT IN AN ORGANIZED HEALTH CARE EDUCATION/TRAINING PROGRAM
Payer: MEDICAID

## 2023-08-08 DIAGNOSIS — R79.89 ELEVATED TROPONIN: ICD-10-CM

## 2023-08-08 DIAGNOSIS — L03.115 CELLULITIS OF RIGHT LOWER EXTREMITY: ICD-10-CM
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
## 2023-08-08 DIAGNOSIS — L03.116 LEFT LEG CELLULITIS: ICD-10-CM

## 2023-08-08 DIAGNOSIS — S83.92XA SPRAIN OF LEFT KNEE, UNSPECIFIED LIGAMENT, INITIAL ENCOUNTER: ICD-10-CM

## 2023-08-08 DIAGNOSIS — R07.9 CHEST PAIN, UNSPECIFIED TYPE: ICD-10-CM

## 2023-08-08 LAB
ALBUMIN SERPL BCP-MCNC: 4.2 G/DL (ref 3.2–4.9)
ALBUMIN/GLOB SERPL: 1.4 G/DL
ALP SERPL-CCNC: 118 U/L (ref 30–99)
ALT SERPL-CCNC: 15 U/L (ref 2–50)
ANION GAP SERPL CALC-SCNC: 8 MMOL/L (ref 7–16)
AST SERPL-CCNC: 14 U/L (ref 12–45)
BASOPHILS # BLD AUTO: 0.2 % (ref 0–1.8)
BASOPHILS # BLD: 0.02 K/UL (ref 0–0.12)
BILIRUB SERPL-MCNC: 0.7 MG/DL (ref 0.1–1.5)
BUN SERPL-MCNC: 18 MG/DL (ref 8–22)
CALCIUM ALBUM COR SERPL-MCNC: 9.1 MG/DL (ref 8.5–10.5)
CALCIUM SERPL-MCNC: 9.3 MG/DL (ref 8.5–10.5)
CHLORIDE SERPL-SCNC: 106 MMOL/L (ref 96–112)
CO2 SERPL-SCNC: 28 MMOL/L (ref 20–33)
CREAT SERPL-MCNC: 1.24 MG/DL (ref 0.5–1.4)
EKG IMPRESSION: NORMAL
EOSINOPHIL # BLD AUTO: 0.13 K/UL (ref 0–0.51)
EOSINOPHIL NFR BLD: 1 % (ref 0–6.9)
ERYTHROCYTE [DISTWIDTH] IN BLOOD BY AUTOMATED COUNT: 51.5 FL (ref 35.9–50)
GFR SERPLBLD CREATININE-BSD FMLA CKD-EPI: 53 ML/MIN/1.73 M 2
GLOBULIN SER CALC-MCNC: 3 G/DL (ref 1.9–3.5)
GLUCOSE SERPL-MCNC: 164 MG/DL (ref 65–99)
HCT VFR BLD AUTO: 37.6 % (ref 37–47)
HGB BLD-MCNC: 11.3 G/DL (ref 12–16)
IMM GRANULOCYTES # BLD AUTO: 0.06 K/UL (ref 0–0.11)
IMM GRANULOCYTES NFR BLD AUTO: 0.5 % (ref 0–0.9)
LYMPHOCYTES # BLD AUTO: 1.91 K/UL (ref 1–4.8)
LYMPHOCYTES NFR BLD: 14.9 % (ref 22–41)
MCH RBC QN AUTO: 26.8 PG (ref 27–33)
MCHC RBC AUTO-ENTMCNC: 30.1 G/DL (ref 32.2–35.5)
MCV RBC AUTO: 89.3 FL (ref 81.4–97.8)
MONOCYTES # BLD AUTO: 0.67 K/UL (ref 0–0.85)
MONOCYTES NFR BLD AUTO: 5.2 % (ref 0–13.4)
NEUTROPHILS # BLD AUTO: 10.01 K/UL (ref 1.82–7.42)
NEUTROPHILS NFR BLD: 78.2 % (ref 44–72)
NRBC # BLD AUTO: 0 K/UL
NRBC BLD-RTO: 0 /100 WBC (ref 0–0.2)
PLATELET # BLD AUTO: 236 K/UL (ref 164–446)
PMV BLD AUTO: 11.3 FL (ref 9–12.9)
POTASSIUM SERPL-SCNC: 4.1 MMOL/L (ref 3.6–5.5)
PROT SERPL-MCNC: 7.2 G/DL (ref 6–8.2)
RBC # BLD AUTO: 4.21 M/UL (ref 4.2–5.4)
SODIUM SERPL-SCNC: 142 MMOL/L (ref 135–145)
TROPONIN T SERPL-MCNC: 37 NG/L (ref 6–19)
TROPONIN T SERPL-MCNC: 38 NG/L (ref 6–19)
WBC # BLD AUTO: 12.8 K/UL (ref 4.8–10.8)

## 2023-08-08 PROCEDURE — 86140 C-REACTIVE PROTEIN: CPT

## 2023-08-08 PROCEDURE — 94760 N-INVAS EAR/PLS OXIMETRY 1: CPT

## 2023-08-08 PROCEDURE — 85025 COMPLETE CBC W/AUTO DIFF WBC: CPT

## 2023-08-08 PROCEDURE — 73562 X-RAY EXAM OF KNEE 3: CPT | Mod: LT

## 2023-08-08 PROCEDURE — 36415 COLL VENOUS BLD VENIPUNCTURE: CPT

## 2023-08-08 PROCEDURE — 700102 HCHG RX REV CODE 250 W/ 637 OVERRIDE(OP): Mod: UD | Performed by: EMERGENCY MEDICINE

## 2023-08-08 PROCEDURE — 93005 ELECTROCARDIOGRAM TRACING: CPT

## 2023-08-08 PROCEDURE — A9270 NON-COVERED ITEM OR SERVICE: HCPCS | Mod: UD | Performed by: EMERGENCY MEDICINE

## 2023-08-08 PROCEDURE — 80053 COMPREHEN METABOLIC PANEL: CPT

## 2023-08-08 PROCEDURE — 84484 ASSAY OF TROPONIN QUANT: CPT | Mod: 91

## 2023-08-08 PROCEDURE — 99285 EMERGENCY DEPT VISIT HI MDM: CPT

## 2023-08-08 PROCEDURE — 96375 TX/PRO/DX INJ NEW DRUG ADDON: CPT

## 2023-08-08 PROCEDURE — 700111 HCHG RX REV CODE 636 W/ 250 OVERRIDE (IP): Mod: JZ,UD | Performed by: EMERGENCY MEDICINE

## 2023-08-08 PROCEDURE — 302875 HCHG BANDAGE ACE 4 OR 6""

## 2023-08-08 PROCEDURE — 71045 X-RAY EXAM CHEST 1 VIEW: CPT

## 2023-08-08 PROCEDURE — 93005 ELECTROCARDIOGRAM TRACING: CPT | Performed by: EMERGENCY MEDICINE

## 2023-08-08 RX ORDER — CEFAZOLIN 2 G/1
2 INJECTION, POWDER, FOR SOLUTION INTRAMUSCULAR; INTRAVENOUS ONCE
Status: COMPLETED | OUTPATIENT
Start: 2023-08-08 | End: 2023-08-08

## 2023-08-08 RX ORDER — ASPIRIN 81 MG/1
324 TABLET, CHEWABLE ORAL ONCE
Status: COMPLETED | OUTPATIENT
Start: 2023-08-08 | End: 2023-08-08

## 2023-08-08 RX ORDER — KETOROLAC TROMETHAMINE 30 MG/ML
30 INJECTION, SOLUTION INTRAMUSCULAR; INTRAVENOUS ONCE
Status: COMPLETED | OUTPATIENT
Start: 2023-08-08 | End: 2023-08-08

## 2023-08-08 RX ADMIN — ASPIRIN 81 MG 324 MG: 81 TABLET ORAL at 20:52

## 2023-08-08 RX ADMIN — KETOROLAC TROMETHAMINE 30 MG: 30 INJECTION, SOLUTION INTRAMUSCULAR; INTRAVENOUS at 20:59

## 2023-08-08 RX ADMIN — CEFAZOLIN 2 G: 2 INJECTION, POWDER, FOR SOLUTION INTRAMUSCULAR; INTRAVENOUS at 21:05

## 2023-08-08 ASSESSMENT — PAIN DESCRIPTION - PAIN TYPE
TYPE: ACUTE PAIN

## 2023-08-08 ASSESSMENT — FIBROSIS 4 INDEX: FIB4 SCORE: 0.46

## 2023-08-09 ENCOUNTER — APPOINTMENT (OUTPATIENT)
Dept: RADIOLOGY | Facility: MEDICAL CENTER | Age: 48
End: 2023-08-09
Attending: STUDENT IN AN ORGANIZED HEALTH CARE EDUCATION/TRAINING PROGRAM
Payer: MEDICAID

## 2023-08-09 VITALS
HEART RATE: 65 BPM | BODY MASS INDEX: 52.49 KG/M2 | RESPIRATION RATE: 18 BRPM | OXYGEN SATURATION: 90 % | TEMPERATURE: 97.6 F | SYSTOLIC BLOOD PRESSURE: 117 MMHG | WEIGHT: 278 LBS | HEIGHT: 61 IN | DIASTOLIC BLOOD PRESSURE: 56 MMHG

## 2023-08-09 PROBLEM — R79.89 ELEVATED TROPONIN: Status: RESOLVED | Noted: 2022-11-06 | Resolved: 2023-08-09

## 2023-08-09 PROBLEM — N17.9 AKI (ACUTE KIDNEY INJURY) (HCC): Status: RESOLVED | Noted: 2023-08-09 | Resolved: 2023-08-09

## 2023-08-09 PROBLEM — N17.9 AKI (ACUTE KIDNEY INJURY) (HCC): Status: ACTIVE | Noted: 2023-08-09

## 2023-08-09 PROBLEM — L03.90 CELLULITIS: Status: ACTIVE | Noted: 2023-08-09

## 2023-08-09 LAB
BASOPHILS # BLD AUTO: 0.1 % (ref 0–1.8)
BASOPHILS # BLD: 0.01 K/UL (ref 0–0.12)
CRP SERPL HS-MCNC: 2.09 MG/DL (ref 0–0.75)
EOSINOPHIL # BLD AUTO: 0.09 K/UL (ref 0–0.51)
EOSINOPHIL NFR BLD: 1 % (ref 0–6.9)
ERYTHROCYTE [DISTWIDTH] IN BLOOD BY AUTOMATED COUNT: 51.7 FL (ref 35.9–50)
ERYTHROCYTE [SEDIMENTATION RATE] IN BLOOD BY WESTERGREN METHOD: 32 MM/HOUR (ref 0–25)
GLUCOSE BLD STRIP.AUTO-MCNC: 102 MG/DL (ref 65–99)
GLUCOSE BLD STRIP.AUTO-MCNC: 131 MG/DL (ref 65–99)
GLUCOSE BLD STRIP.AUTO-MCNC: 140 MG/DL (ref 65–99)
GLUCOSE BLD STRIP.AUTO-MCNC: 93 MG/DL (ref 65–99)
HCT VFR BLD AUTO: 35.8 % (ref 37–47)
HGB BLD-MCNC: 10.8 G/DL (ref 12–16)
IMM GRANULOCYTES # BLD AUTO: 0.03 K/UL (ref 0–0.11)
IMM GRANULOCYTES NFR BLD AUTO: 0.3 % (ref 0–0.9)
LYMPHOCYTES # BLD AUTO: 1.56 K/UL (ref 1–4.8)
LYMPHOCYTES NFR BLD: 17.7 % (ref 22–41)
MCH RBC QN AUTO: 27.3 PG (ref 27–33)
MCHC RBC AUTO-ENTMCNC: 30.2 G/DL (ref 32.2–35.5)
MCV RBC AUTO: 90.6 FL (ref 81.4–97.8)
MONOCYTES # BLD AUTO: 0.56 K/UL (ref 0–0.85)
MONOCYTES NFR BLD AUTO: 6.3 % (ref 0–13.4)
NEUTROPHILS # BLD AUTO: 6.57 K/UL (ref 1.82–7.42)
NEUTROPHILS NFR BLD: 74.6 % (ref 44–72)
NRBC # BLD AUTO: 0 K/UL
NRBC BLD-RTO: 0 /100 WBC (ref 0–0.2)
PLATELET # BLD AUTO: 214 K/UL (ref 164–446)
PMV BLD AUTO: 11 FL (ref 9–12.9)
PROCALCITONIN SERPL-MCNC: 0.07 NG/ML
RBC # BLD AUTO: 3.95 M/UL (ref 4.2–5.4)
WBC # BLD AUTO: 8.8 K/UL (ref 4.8–10.8)

## 2023-08-09 PROCEDURE — 82962 GLUCOSE BLOOD TEST: CPT | Mod: 91

## 2023-08-09 PROCEDURE — 96365 THER/PROPH/DIAG IV INF INIT: CPT

## 2023-08-09 PROCEDURE — 36415 COLL VENOUS BLD VENIPUNCTURE: CPT

## 2023-08-09 PROCEDURE — 700111 HCHG RX REV CODE 636 W/ 250 OVERRIDE (IP): Mod: UD | Performed by: STUDENT IN AN ORGANIZED HEALTH CARE EDUCATION/TRAINING PROGRAM

## 2023-08-09 PROCEDURE — 700102 HCHG RX REV CODE 250 W/ 637 OVERRIDE(OP): Mod: UD | Performed by: STUDENT IN AN ORGANIZED HEALTH CARE EDUCATION/TRAINING PROGRAM

## 2023-08-09 PROCEDURE — 84145 PROCALCITONIN (PCT): CPT

## 2023-08-09 PROCEDURE — G0378 HOSPITAL OBSERVATION PER HR: HCPCS

## 2023-08-09 PROCEDURE — 85025 COMPLETE CBC W/AUTO DIFF WBC: CPT

## 2023-08-09 PROCEDURE — 99236 HOSP IP/OBS SAME DATE HI 85: CPT | Performed by: STUDENT IN AN ORGANIZED HEALTH CARE EDUCATION/TRAINING PROGRAM

## 2023-08-09 PROCEDURE — A9270 NON-COVERED ITEM OR SERVICE: HCPCS | Mod: UD | Performed by: STUDENT IN AN ORGANIZED HEALTH CARE EDUCATION/TRAINING PROGRAM

## 2023-08-09 PROCEDURE — 85652 RBC SED RATE AUTOMATED: CPT

## 2023-08-09 PROCEDURE — 97162 PT EVAL MOD COMPLEX 30 MIN: CPT

## 2023-08-09 PROCEDURE — 700111 HCHG RX REV CODE 636 W/ 250 OVERRIDE (IP): Mod: UD

## 2023-08-09 PROCEDURE — 78452 HT MUSCLE IMAGE SPECT MULT: CPT

## 2023-08-09 PROCEDURE — 700105 HCHG RX REV CODE 258: Mod: UD | Performed by: STUDENT IN AN ORGANIZED HEALTH CARE EDUCATION/TRAINING PROGRAM

## 2023-08-09 RX ORDER — INSULIN LISPRO 100 [IU]/ML
1-6 INJECTION, SOLUTION INTRAVENOUS; SUBCUTANEOUS EVERY 6 HOURS
Status: DISCONTINUED | OUTPATIENT
Start: 2023-08-09 | End: 2023-08-09 | Stop reason: HOSPADM

## 2023-08-09 RX ORDER — ATORVASTATIN CALCIUM 20 MG/1
20 TABLET, FILM COATED ORAL
Status: DISCONTINUED | OUTPATIENT
Start: 2023-08-09 | End: 2023-08-09 | Stop reason: HOSPADM

## 2023-08-09 RX ORDER — REGADENOSON 0.08 MG/ML
0.4 INJECTION, SOLUTION INTRAVENOUS ONCE
Status: COMPLETED | OUTPATIENT
Start: 2023-08-09 | End: 2023-08-09

## 2023-08-09 RX ORDER — METOPROLOL SUCCINATE 25 MG/1
25 TABLET, EXTENDED RELEASE ORAL DAILY
Status: DISCONTINUED | OUTPATIENT
Start: 2023-08-09 | End: 2023-08-09 | Stop reason: HOSPADM

## 2023-08-09 RX ORDER — AMINOPHYLLINE 25 MG/ML
100 INJECTION, SOLUTION INTRAVENOUS
Status: DISCONTINUED | OUTPATIENT
Start: 2023-08-09 | End: 2023-08-09 | Stop reason: HOSPADM

## 2023-08-09 RX ORDER — MONTELUKAST SODIUM 10 MG/1
10 TABLET ORAL EVERY EVENING
Status: DISCONTINUED | OUTPATIENT
Start: 2023-08-09 | End: 2023-08-09 | Stop reason: HOSPADM

## 2023-08-09 RX ORDER — ARIPIPRAZOLE 10 MG/1
30 TABLET ORAL EVERY MORNING
Status: DISCONTINUED | OUTPATIENT
Start: 2023-08-09 | End: 2023-08-09 | Stop reason: HOSPADM

## 2023-08-09 RX ORDER — ALBUTEROL SULFATE 90 UG/1
2 AEROSOL, METERED RESPIRATORY (INHALATION) EVERY 6 HOURS PRN
Status: DISCONTINUED | OUTPATIENT
Start: 2023-08-09 | End: 2023-08-09 | Stop reason: HOSPADM

## 2023-08-09 RX ORDER — SODIUM CHLORIDE 9 MG/ML
INJECTION, SOLUTION INTRAVENOUS CONTINUOUS
Status: DISCONTINUED | OUTPATIENT
Start: 2023-08-09 | End: 2023-08-09 | Stop reason: HOSPADM

## 2023-08-09 RX ORDER — CEPHALEXIN 500 MG/1
500 CAPSULE ORAL 4 TIMES DAILY
Qty: 40 CAPSULE | Refills: 0 | Status: SHIPPED | OUTPATIENT
Start: 2023-08-09 | End: 2023-08-19

## 2023-08-09 RX ORDER — NITROGLYCERIN 0.4 MG/1
0.4 TABLET SUBLINGUAL
Status: DISCONTINUED | OUTPATIENT
Start: 2023-08-09 | End: 2023-08-09 | Stop reason: HOSPADM

## 2023-08-09 RX ORDER — METHOCARBAMOL 750 MG/1
1500 TABLET, FILM COATED ORAL 3 TIMES DAILY
Status: DISCONTINUED | OUTPATIENT
Start: 2023-08-09 | End: 2023-08-09 | Stop reason: HOSPADM

## 2023-08-09 RX ORDER — CEPHALEXIN 500 MG/1
500 CAPSULE ORAL 4 TIMES DAILY
Qty: 40 CAPSULE | Refills: 0 | Status: SHIPPED | OUTPATIENT
Start: 2023-08-09 | End: 2023-08-09 | Stop reason: SDUPTHER

## 2023-08-09 RX ORDER — DEXTROSE MONOHYDRATE 25 G/50ML
25 INJECTION, SOLUTION INTRAVENOUS
Status: DISCONTINUED | OUTPATIENT
Start: 2023-08-09 | End: 2023-08-09 | Stop reason: HOSPADM

## 2023-08-09 RX ORDER — REGADENOSON 0.08 MG/ML
INJECTION, SOLUTION INTRAVENOUS
Status: COMPLETED
Start: 2023-08-09 | End: 2023-08-09

## 2023-08-09 RX ORDER — ACETAMINOPHEN 325 MG/1
650 TABLET ORAL EVERY 6 HOURS PRN
Status: DISCONTINUED | OUTPATIENT
Start: 2023-08-09 | End: 2023-08-09 | Stop reason: HOSPADM

## 2023-08-09 RX ADMIN — REGADENOSON 0.4 MG: 0.08 INJECTION, SOLUTION INTRAVENOUS at 11:42

## 2023-08-09 RX ADMIN — METHOCARBAMOL 1500 MG: 750 TABLET ORAL at 14:00

## 2023-08-09 RX ADMIN — SODIUM CHLORIDE: 900 INJECTION INTRAVENOUS at 03:16

## 2023-08-09 RX ADMIN — CEFAZOLIN 2 G: 2 INJECTION, POWDER, FOR SOLUTION INTRAMUSCULAR; INTRAVENOUS at 13:58

## 2023-08-09 RX ADMIN — ARIPIPRAZOLE 30 MG: 10 TABLET ORAL at 06:04

## 2023-08-09 RX ADMIN — SODIUM CHLORIDE: 900 INJECTION INTRAVENOUS at 13:55

## 2023-08-09 RX ADMIN — METOPROLOL SUCCINATE 25 MG: 25 TABLET, EXTENDED RELEASE ORAL at 06:18

## 2023-08-09 RX ADMIN — METHOCARBAMOL 1500 MG: 750 TABLET ORAL at 06:04

## 2023-08-09 RX ADMIN — CEFAZOLIN 2 G: 2 INJECTION, POWDER, FOR SOLUTION INTRAMUSCULAR; INTRAVENOUS at 06:04

## 2023-08-09 ASSESSMENT — COGNITIVE AND FUNCTIONAL STATUS - GENERAL
MOVING FROM LYING ON BACK TO SITTING ON SIDE OF FLAT BED: A LOT
TURNING FROM BACK TO SIDE WHILE IN FLAT BAD: A LITTLE
CLIMB 3 TO 5 STEPS WITH RAILING: A LITTLE
MOBILITY SCORE: 15
SUGGESTED CMS G CODE MODIFIER MOBILITY: CK
WALKING IN HOSPITAL ROOM: A LITTLE
STANDING UP FROM CHAIR USING ARMS: A LITTLE
MOVING TO AND FROM BED TO CHAIR: UNABLE

## 2023-08-09 ASSESSMENT — LIFESTYLE VARIABLES
CONSUMPTION TOTAL: NEGATIVE
TOTAL SCORE: 0
AVERAGE NUMBER OF DAYS PER WEEK YOU HAVE A DRINK CONTAINING ALCOHOL: 0
HOW MANY TIMES IN THE PAST YEAR HAVE YOU HAD 5 OR MORE DRINKS IN A DAY: 0
TOTAL SCORE: 0
TOTAL SCORE: 0
EVER FELT BAD OR GUILTY ABOUT YOUR DRINKING: NO
ALCOHOL_USE: NO
EVER HAD A DRINK FIRST THING IN THE MORNING TO STEADY YOUR NERVES TO GET RID OF A HANGOVER: NO
HAVE YOU EVER FELT YOU SHOULD CUT DOWN ON YOUR DRINKING: NO
HAVE PEOPLE ANNOYED YOU BY CRITICIZING YOUR DRINKING: NO
ON A TYPICAL DAY WHEN YOU DRINK ALCOHOL HOW MANY DRINKS DO YOU HAVE: 0

## 2023-08-09 ASSESSMENT — ENCOUNTER SYMPTOMS
PSYCHIATRIC NEGATIVE: 1
FALLS: 1
WEAKNESS: 1
GASTROINTESTINAL NEGATIVE: 1
EYES NEGATIVE: 1

## 2023-08-09 ASSESSMENT — GAIT ASSESSMENTS
ASSISTIVE DEVICE: FRONT WHEEL WALKER
GAIT LEVEL OF ASSIST: STANDBY ASSIST
DISTANCE (FEET): 25

## 2023-08-09 ASSESSMENT — PAIN DESCRIPTION - PAIN TYPE: TYPE: ACUTE PAIN

## 2023-08-09 ASSESSMENT — FIBROSIS 4 INDEX: FIB4 SCORE: 0.75

## 2023-08-09 NOTE — ED NOTES
Checked on bed, connected to monitor,asleep  with unlabored respirations. Vital signs is stable.    No current needs identified.  Gurney in low position, side rail up for pt safety. Call light within reach.

## 2023-08-09 NOTE — ED PROVIDER NOTES
"ER Provider Note    Scribed for Dr. Albania Mora D.O. by Isha Fernandez. 8/8/2023  8:07 PM    Primary Care Provider: Heriberto Sharp M.D.    CHIEF COMPLAINT  Chief Complaint   Patient presents with    T-5000 GLF     Yesterday while in the shower, pt slipped and fell to the ground. -LOC. -headstrike. Pt states she hit her L knee and has been having L knee pain since.     Chest Pain     Central, non-radiating chest pain described as \"dull.\"      EXTERNAL RECORDS REVIEWED  Outpatient Notes Patient was hospitalized on 7/31/23 for postural dizziness with presyncope.     HPI/ROS  LIMITATION TO HISTORY   Select: Poor historian    Lorene Haro is a 47 y.o. female who presents to the ED via EMS for a fall onset yesterday. She states she was in the bathroom when she slipped. She is unsure on how she landed. She states she is currently living at a group home where she sleeps by herself and shares a bathroom. She has associated left knee pain, left lower leg redness, and cough. She notes her cough began 3 days ago. Patient denies fevers. Patient has a history of atrial fibrillation but denies any myocarial infarcation. Patient was seen one week ago for a right shoulder injury and was placed in a sling, she is currently laying in her gurney without a sling. Patient denies taking any antibiotics for her pain. No alleviating or exacerbating factors noted. Drug allergies to amoxicillin and penicillin.     PAST MEDICAL HISTORY  Past Medical History:   Diagnosis Date    A-fib (HCC)     Asthma     Bipolar 2 disorder (HCC)     Chickenpox     Chronic obstructive pulmonary disease (HCC)     Hypertension     On home oxygen therapy     Other psychological stress     Schizophrenic disorder (HCC)     Yeast infection of the skin 04/01/2021     SURGICAL HISTORY  Past Surgical History:   Procedure Laterality Date    CHOLECYSTECTOMY      COLECTOMY      HYSTERECTOMY LAPAROSCOPY      KNEE ARTHROSCOPY Left      FAMILY HISTORY  Family History " "  Problem Relation Age of Onset    No Known Problems Mother     Cancer Sister      SOCIAL HISTORY   reports that she quit smoking about 9 months ago. Her smoking use included cigarettes. She smoked an average of .25 packs per day. She has never used smokeless tobacco. She reports that she does not currently use alcohol. She reports that she does not use drugs.    CURRENT MEDICATIONS  Current Outpatient Medications   Medication Instructions    albuterol 108 (90 Base) MCG/ACT Aero Soln inhalation aerosol 2 Puffs, Inhalation, EVERY 6 HOURS PRN    aripiprazole (ABILIFY) 30 mg, Oral, EVERY MORNING    atorvastatin (LIPITOR) 20 mg, Oral, EVERY BEDTIME    famotidine (PEPCID) 20 mg, Oral, 2 TIMES DAILY PRN, For upper belly pain    furosemide (LASIX) 20 mg, Oral, DAILY    methocarbamol (ROBAXIN) 1,500 mg, Oral, 3 TIMES DAILY    metoprolol SR (TOPROL XL) 25 mg, Oral, DAILY    montelukast (SINGULAIR) 10 mg, Oral, EVERY EVENING     ALLERGIES  Penicillin g and Amoxicillin    PHYSICAL EXAM  /58   Pulse 73   Temp 36.5 °C (97.7 °F) (Temporal)   Resp 17   Ht 1.549 m (5' 1\")   Wt (!) 126 kg (278 lb 7.1 oz)   LMP  (LMP Unknown)   SpO2 94% Comment: RA  BMI 52.61 kg/m²   Constitutional: Patient is well developed, well nourished. Non-toxic appearing. Mild distress, Patient is a very poor historian. Morbidly obese. No signs of trauma.  HENT: Normocephalic, atraumatic. . Moist mucous membranes.    Cardiovascular: Normal heart rate and Regular rhythm. No murmur  Thorax & Lungs: Clear and equal breath sounds with good excursion. No respiratory distress, no rhonchi, wheezing or rales. No chest tenderness, rashes, lesions or signs of trauma  Abdomen: Bowel sounds normal in all four quadrants. Soft,nontender, no rebound , guarding, palpable masses. Abdomen is obese.   Skin: Warm, Dry, No contusions or abrasions    Extremities: Peripheral pulses 4/4 No edema, Left knee tenderness on proximal tib fib. No joint effusion. No contusion, " no abrasion. Neurovascular intact. Left lower leg increased erythema and warmth. Right leg is normal. Mild tenderness on right shoulder.   Musculoskeletal: Normal range of motion in all major joints.   Neurologic: Alert & oriented x 3, Normal motor function, Normal sensory function  Psychiatric: Affect odd    DIAGNOSTIC STUDIES & PROCEDURES  Labs:   Results for orders placed or performed during the hospital encounter of 08/08/23   CBC with Differential   Result Value Ref Range    WBC 12.8 (H) 4.8 - 10.8 K/uL    RBC 4.21 4.20 - 5.40 M/uL    Hemoglobin 11.3 (L) 12.0 - 16.0 g/dL    Hematocrit 37.6 37.0 - 47.0 %    MCV 89.3 81.4 - 97.8 fL    MCH 26.8 (L) 27.0 - 33.0 pg    MCHC 30.1 (L) 32.2 - 35.5 g/dL    RDW 51.5 (H) 35.9 - 50.0 fL    Platelet Count 236 164 - 446 K/uL    MPV 11.3 9.0 - 12.9 fL    Neutrophils-Polys 78.20 (H) 44.00 - 72.00 %    Lymphocytes 14.90 (L) 22.00 - 41.00 %    Monocytes 5.20 0.00 - 13.40 %    Eosinophils 1.00 0.00 - 6.90 %    Basophils 0.20 0.00 - 1.80 %    Immature Granulocytes 0.50 0.00 - 0.90 %    Nucleated RBC 0.00 0.00 - 0.20 /100 WBC    Neutrophils (Absolute) 10.01 (H) 1.82 - 7.42 K/uL    Lymphs (Absolute) 1.91 1.00 - 4.80 K/uL    Monos (Absolute) 0.67 0.00 - 0.85 K/uL    Eos (Absolute) 0.13 0.00 - 0.51 K/uL    Baso (Absolute) 0.02 0.00 - 0.12 K/uL    Immature Granulocytes (abs) 0.06 0.00 - 0.11 K/uL    NRBC (Absolute) 0.00 K/uL   Complete Metabolic Panel (CMP)   Result Value Ref Range    Sodium 142 135 - 145 mmol/L    Potassium 4.1 3.6 - 5.5 mmol/L    Chloride 106 96 - 112 mmol/L    Co2 28 20 - 33 mmol/L    Anion Gap 8.0 7.0 - 16.0    Glucose 164 (H) 65 - 99 mg/dL    Bun 18 8 - 22 mg/dL    Creatinine 1.24 0.50 - 1.40 mg/dL    Calcium 9.3 8.5 - 10.5 mg/dL    Correct Calcium 9.1 8.5 - 10.5 mg/dL    AST(SGOT) 14 12 - 45 U/L    ALT(SGPT) 15 2 - 50 U/L    Alkaline Phosphatase 118 (H) 30 - 99 U/L    Total Bilirubin 0.7 0.1 - 1.5 mg/dL    Albumin 4.2 3.2 - 4.9 g/dL    Total Protein 7.2 6.0 - 8.2  g/dL    Globulin 3.0 1.9 - 3.5 g/dL    A-G Ratio 1.4 g/dL   Troponins NOW   Result Value Ref Range    Troponin T 38 (H) 6 - 19 ng/L   Troponins in two (2) hours   Result Value Ref Range    Troponin T 37 (H) 6 - 19 ng/L   ESTIMATED GFR   Result Value Ref Range    GFR (CKD-EPI) 53 (A) >60 mL/min/1.73 m 2   EKG   Result Value Ref Range    Report       [facility] Emergency Dept.    Test Date:  2023  Pt Name:    ADELINA MILLAN                Department: ER  MRN:        [id number]                      Room:  Gender:     Female                       Technician: [id number]  :        1975                   Requested By:ER TRIAGE PROTOCOL  Order #:    [id number]                    Reading MD:    Measurements  Intervals                                Axis  Rate:       74                           P:          59  DE:         129                          QRS:        57  QRSD:       104                          T:          3  QT:         368  QTc:        409    Interpretive Statements  Sinus rhythm  Borderline T abnormalities, inferior leads  Compared to ECG 2023 23:48:55  T-wave abnormality now present     All labs reviewed by me.    EKG:   I have independently interpreted this EKG     Radiology:   The attending Emergency Physician has independently interpreted the diagnostic imaging associated with this visit and is awaiting the final reading from the radiologist, which will be displayed below.  Preliminary interpretation is a follows: No acute fracture or dislocation, chest x-ray shows no infiltrates or CHF.  Radiologist interpretation:  DX-KNEE 3 VIEWS LEFT   Final Result      Mild osteophytic degenerative changes of the knee without acute fracture or malalignment.      DX-CHEST-PORTABLE (1 VIEW)   Final Result      No acute cardiopulmonary disease evident.         COURSE & MEDICAL DECISION MAKING    ED Observation Status? Yes; I am placing the patient in to an observation status due to a  diagnostic uncertainty as well as therapeutic intensity. Patient placed in observation status at 8:10 PM, 8/8/2023.     Observation plan is as follows: We will manage their symptoms, evaluate with lab work and imaging, and then reassess after results are reviewed     Upon Reevaluation, the patient's condition has: not improved; and will be escalated to hospitalization.    Patient discharged from ED Observation status at 12:58 AM (Time) 8/9/2023 (Date).     INITIAL ASSESSMENT AND PLAN  Care Narrative:   8:07 PM - Patient was seen and evaluated at bedside. Patient presents to the ED via EMS for a fall onset yesterday.  After my exam, I discussed with the patient the plan of care, which includes treating the patient with medication for their symptoms, as well as obtaining lab work and imaging for further evaluation. Patient understands and verbalizes agreement to plan of care. Patient will be treated with asprin 324 mg, Ancef 2 g and Toradol 30 mg. Ordered DX-knee, DX-chess, CBC w/ diff, CMP, troponin and EKG to evaluate. Differential diagnoses include but are not limited to: Cardiac chest pain vs chest contusion. Left knee fracture vs sprain. Left leg cellulitis    Ace wrap was applied to patient's left knee for comfort.  Her second troponin came back consistently elevated which appears to be a chronic problem for her but she continues to have some nonspecific chest pain.  I feel she needs to be admitted into the hospital for treatment for her left leg cellulitis and for further cardiac workup.    12:03 AM - Patient was reevaluated at bedside. Discussed lab and radiology results with the patient. Patient is still experiencing intermittent chest pain, and informed her with plan of hospitalization. Patient verbalizes understanding and agreement to this plan of care.     12:35 AM - I discussed the patient's case and the above findings with Dr. Quiroga (Hospitalist) who agrees to evaluate the patient for hospitalization.                 DISPOSITION AND DISCUSSIONS  I have discussed management of the patient with the following physicians and JAY's: Dr. Quiroga    Discussion of management with other [facility] or appropriate source(s): None       Barriers to care at this time, including but not limited to:  None .     Decision tools and prescription drugs considered including, but not limited to: Hospital admission for cardiac evaluation and IV antibiotics..    DISPOSITION:  Patient will be hospitalized by Dr. Quiroga in guarded condition.    FINAL IMPRESSION   1. Chest pain, unspecified type    2. Elevated troponin    3. Left leg cellulitis    4. Fall, initial encounter    5. Sprain of left knee, unspecified ligament, initial encounter       IIsha (Kevonibe), am scribing for, and in the presence of, Albania Mora D.O..    Electronically signed by: Isha Fernandez (Ade), 8/8/2023    IAlbania D.O. personally performed the services described in this documentation, as scribed by Isha Fernandez in my presence, and it is both accurate and complete.    The note accurately reflects work and decisions made by me.  Albania Mora D.O.  8/9/2023  3:43 AM

## 2023-08-09 NOTE — ED NOTES
Bedside report received from JC Finney. Assumed care of patient. Purewick catheter initiated per patient request to urinate. Patient aware of POC. Fall precautions in place. Call light within reach.

## 2023-08-09 NOTE — ASSESSMENT & PLAN NOTE
Noted to have troponin of 38 and 37.  EKG does not demonstrate any ischemic changes.  Loaded with aspirin in ED.  Nitroglycerin as needed.  Stress test in a.m.

## 2023-08-09 NOTE — ED NOTES
Patient transported to nuclear medicine on T2 tele box. Chart and belongings sent with patient. Patient care transferred to Sue GREENE.

## 2023-08-09 NOTE — PROGRESS NOTES
Report received from JC Gutierrez.  Patient returned from Stress Test.  Assumed care of patient.  Assessment complete.  Plan of care gone over with the patient and all concerns addressed.  Patient ambulated from gurney to bed.  Patient A & O  x 4.  No apparent signs of distress.  Safety precautions in place.  Patient educated to call for assistance.  Hourly rounding in place.

## 2023-08-09 NOTE — ASSESSMENT & PLAN NOTE
Spoke with ERP.  Patient found to have a mechanical fall yesterday and since then is reporting of erythematous left lower extremity and tenderness.  X-ray left does not demonstrate any acute pathology.  Started on Ancef in ED.  Can continue Ancef.  Monitor for rash and diarrhea from Ancef administration.

## 2023-08-09 NOTE — ED NOTES
Report received from Viewex Sung. Patient AOX4 on room air; GCS15; assumed patient care. Will continue to monitor

## 2023-08-09 NOTE — THERAPY
Physical Therapy   Initial Evaluation     Patient Name: Lorene Haor  Age:  47 y.o., Sex:  female  Medical Record #: [id number]  Today's Date: 8/9/2023     Precautions  Precautions: Fall Risk    Assessment  Patient is 47 y.o. female admitted for a GLF. PMH includes chronic respiratory failure on home O2 used at night, HTN, schizophrenia. Pt reports that she lives with 3 other women in the group home that can assist her as needed. Pt appears at functional baseline. Pt does not require further acute care PT.     Plan    Physical Therapy Initial Treatment Plan   Duration: (P) Evaluation only    DC Equipment Recommendations: (P) None  Discharge Recommendations: (P) Anticipate that the patient will have no further physical therapy needs after discharge from the hospital     Objective       08/09/23 9812    Services   Is patient using  services for this encounter? No   Initial Contact Note    Initial Contact Note Order Received and Verified, Evaluation Only - Patient Does Not Require Further Acute Physical Therapy at this Time.  However, May Benefit from Post Acute Therapy for Higher Level Functional Deficits.   Precautions   Precautions Fall Risk   Vitals   Patient BP Position Sitting   Blood Pressure 117/71  (Taken after pt reported dizziness sitting EOB)   O2 (LPM) 0   O2 Delivery Device None - Room Air   Pain 0 - 10 Group   Therapist Pain Assessment Post Activity Pain Same as Prior to Activity;Nurse Notified  (Pt reported L knee pain, unrated)   Prior Living Situation   Prior Services Intermittent Physical Support for ADL Per Service  (Group home)   Housing / Facility Group Home   Steps Into Home 0   Steps In Home 0   Bathroom Set up Grab Bars   Equipment Owned 4-Wheel Walker   Lives with - Patient's Self Care Capacity Unrelated Adult   Comments Pt states she lives with 3 other women who can assist when needed   Prior Level of Functional Mobility   Bed Mobility Independent   Transfer Status  Independent   Ambulation Independent   Ambulation Distance Community   Assistive Devices Used 4-Wheel Walker   Stairs Independent   History of Falls   History of Falls Yes   Date of Last Fall 08/08/23   Cognition    Cognition / Consciousness WDL   Level of Consciousness Alert   Active ROM Lower Body    Active ROM Lower Body  WDL   Strength Lower Body   Lower Body Strength  X   Comments R LE strength > L LE strength d/t pain   Sensation Lower Body   Lower Extremity Sensation   WDL   Coordination Lower Body    Coordination Lower Body  Not Tested   Other Treatments   Other Treatments Provided Pt educated on importrance of movement and getting out of bed   Balance Assessment   Sitting Balance (Static) Fair   Sitting Balance (Dynamic) Fair   Standing Balance (Static) Fair   Standing Balance (Dynamic) Fair   Weight Shift Sitting Fair   Weight Shift Standing Fair   Comments Pt used FWW   Bed Mobility    Supine to Sit Minimal Assist   Sit to Supine   (n/t finished in chair)   Scooting Supervised   Rolling Supervised   Comments HOB elevated, pt used bed rails. Pt states the women she lives with can assist when needed   Gait Analysis   Gait Level Of Assist Standby Assist   Assistive Device Front Wheel Walker   Distance (Feet) 25   # of Times Distance was Traveled 2   Deviation Step To;Bradykinetic;Shuffled Gait;Decreased Heel Strike;Decreased Toe Off;Antalgic   Weight Bearing Status No restrictions   Comments Pt walked from EOB to hallway bathroom w/FWW   Functional Mobility   Sit to Stand Standby Assist   Bed, Chair, Wheelchair Transfer Standby Assist   Toilet Transfers Standby Assist   Transfer Method Stand Step   Mobility In room, hallway, and hallway bathroom   Comments Pt used FWW   How much difficulty does the patient currently have...   Turning over in bed (including adjusting bedclothes, sheets and blankets)? 3   Sitting down on and standing up from a chair with arms (e.g., wheelchair, bedside commode, etc.) 2    Moving from lying on back to sitting on the side of the bed? 1   How much help from another person does the patient currently need...   Moving to and from a bed to a chair (including a wheelchair)? 3   Need to walk in a hospital room? 3   Climbing 3-5 steps with a railing? 3   6 clicks Mobility Score 15   Education Group   Education Provided Role of Physical Therapist   Role of Physical Therapist Patient Response Patient;Acceptance;Demonstration;Explanation;Verbal Demonstration;Action Demonstration   Additional Comments Pt educated on importance of movement and getting out of bed.   Physical Therapy Initial Treatment Plan    Duration Evaluation only   Anticipated Discharge Equipment and Recommendations   DC Equipment Recommendations None   Discharge Recommendations Anticipate that the patient will have no further physical therapy needs after discharge from the hospital   Interdisciplinary Plan of Care Collaboration   IDT Collaboration with  ;Nursing   Patient Position at End of Therapy Seated;Chair Alarm On;Call Light within Reach;Tray Table within Reach;Phone within Reach   Collaboration Comments RN and  notified   Session Information   Date / Session Number  8/9 - 1x

## 2023-08-09 NOTE — ED NOTES
PT in w/c assisted from ED lobby to Ortho 1. PT complains of L knee pain with bilateral flank pain, and sternal chest pain at this time. Allergies reviewed. PT on monitors resting in position of comfort at this time, call bell within reach, bedrails in upright position.

## 2023-08-09 NOTE — ED NOTES
Checked on bed, connected to monitor,  awake with unlabored respirations. Vital signs is stable.   Denied any new complaints. No current needs identified.  Gurney in low position, side rail up for pt safety. Call light within reach.

## 2023-08-09 NOTE — ED TRIAGE NOTES
"Chief Complaint   Patient presents with    T-5000 GLF     Yesterday while in the shower, pt slipped and fell to the ground. -LOC. -headstrike. Pt states she hit her L knee and has been having L knee pain since.     Chest Pain     Central, non-radiating chest pain described as \"dull.\"      Pt BIB EMS with above complaints. Pt states her only cardiac history is a fib and HTN. Pt is breathing with even, unlabored respirations in triage. EMS administered 250 cc NS.    Protocol ordered. Pt educated on triage process, placed back in lobby, and instructed to inform staff of any changes.     /58   Pulse 73   Temp 36.5 °C (97.7 °F) (Temporal)   Resp 17   Ht 1.549 m (5' 1\")   Wt (!) 126 kg (278 lb 7.1 oz)   LMP  (LMP Unknown)   SpO2 94% Comment: RA  BMI 52.61 kg/m²     "

## 2023-08-09 NOTE — H&P
"Hospital Medicine History & Physical Note    Date of Service  8/9/2023    Primary Care Physician  Heriberto Sharp M.D.    Consultants  None    Code Status  Full Code    Chief Complaint  Chief Complaint   Patient presents with    T-5000 GLF     Yesterday while in the shower, pt slipped and fell to the ground. -LOC. -headstrike. Pt states she hit her L knee and has been having L knee pain since.     Chest Pain     Central, non-radiating chest pain described as \"dull.\"        History of Presenting Illness  Lorene Haro is a 47 y.o. female who presented 8/8/2023 with chest pain.  Patient reports a constant pressure-like chest pain for the last 24 hours.  Describes as substernal and worse upon deep inspiration.  In addition, she did have a mechanical fall yesterday and fell on her left knee.  Since then her left knee has become more tender and erythematous, prompting her to come to ER for evaluation.    Patient has a history of chronic respiratory failure on home O2, paroxysmal A-fib with implantable loop recorder, hypertension, schizophrenia, GERD    In ER, patient found to have normal vital signs.  Pertinent labs include neutrophil leukocytosis, MONI, troponin 38 and 37.  Chest x-ray showing no acute cardiopulmonary abnormality.  X-ray left knee negative for acute pathology.  EKG showing normal sinus rhythm with no ischemic changes.    I discussed the plan of care with patient.    Review of Systems  Review of Systems   Constitutional:  Positive for malaise/fatigue.   HENT: Negative.     Eyes: Negative.    Cardiovascular:  Positive for chest pain.   Gastrointestinal: Negative.    Genitourinary: Negative.    Musculoskeletal:  Positive for falls.   Skin: Negative.    Neurological:  Positive for weakness.   Endo/Heme/Allergies: Negative.    Psychiatric/Behavioral: Negative.         Past Medical History   has a past medical history of A-fib (HCC), Asthma, Bipolar 2 disorder (HCC), Chickenpox, Chronic obstructive " pulmonary disease (HCC), Hypertension, On home oxygen therapy, Other psychological stress, Schizophrenic disorder (HCC), and Yeast infection of the skin (04/01/2021).    Surgical History   has a past surgical history that includes hysterectomy laparoscopy; colectomy; cholecystectomy; and knee arthroscopy (Left).     Family History  family history includes Cancer in her sister; No Known Problems in her mother.   Family history reviewed with patient. There is no family history that is pertinent to the chief complaint.     Social History   reports that she quit smoking about 9 months ago. Her smoking use included cigarettes. She smoked an average of .25 packs per day. She has never used smokeless tobacco. She reports that she does not currently use alcohol. She reports that she does not use drugs.    Allergies  Allergies   Allergen Reactions    Penicillin G Rash and Itching     pt reports that she gets a rash all over her body and gets itchy    Amoxicillin Rash and Itching     Tolerates cephalosporins, pt reports that she gets a rash all over her body and gets itchy       Medications  Prior to Admission Medications   Prescriptions Last Dose Informant Patient Reported? Taking?   albuterol 108 (90 Base) MCG/ACT Aero Soln inhalation aerosol   No No   Sig: Inhale 2 Puffs every 6 hours as needed for Shortness of Breath.   aripiprazole (ABILIFY) 30 MG tablet  Patient's Home Pharmacy No No   Sig: Take 1 Tablet by mouth every morning. Indications: Major Depressive Disorder   atorvastatin (LIPITOR) 20 MG Tab  Patient's Home Pharmacy No No   Sig: Take 1 Tablet by mouth at bedtime.   famotidine (PEPCID) 20 MG Tab  Patient's Home Pharmacy No No   Sig: Take 1 Tablet by mouth 2 times a day as needed (pain). For upper belly pain   furosemide (LASIX) 20 MG Tab  Patient's Home Pharmacy No No   Sig: Take 1 Tablet by mouth every day.   methocarbamol (ROBAXIN) 750 MG Tab  Patient's Home Pharmacy No No   Sig: Take 2 Tablets by mouth 3  times a day.   metoprolol SR (TOPROL XL) 25 MG TABLET SR 24 HR  Patient's Home Pharmacy Yes No   Sig: Take 25 mg by mouth every day.   montelukast (SINGULAIR) 10 MG Tab  Patient's Home Pharmacy No No   Sig: Take 1 Tablet by mouth every evening.      Facility-Administered Medications: None       Physical Exam  Temp:  [36.5 °C (97.7 °F)] 36.5 °C (97.7 °F)  Pulse:  [71-76] 71  Resp:  [16-21] 16  BP: (103-137)/(52-58) 123/57  SpO2:  [91 %-94 %] 93 %  Blood Pressure: 123/57   Temperature: 36.5 °C (97.7 °F)   Pulse: 71   Respiration: 16   Pulse Oximetry: 93 %       Physical Exam  Constitutional:       Appearance: Normal appearance. She is obese.   HENT:      Head: Normocephalic.      Nose: Nose normal.      Mouth/Throat:      Mouth: Mucous membranes are moist.   Eyes:      Pupils: Pupils are equal, round, and reactive to light.   Cardiovascular:      Rate and Rhythm: Normal rate and regular rhythm.      Pulses: Normal pulses.      Comments: Reproducible chest pain  Pulmonary:      Effort: Pulmonary effort is normal.      Breath sounds: Normal breath sounds.   Abdominal:      General: Abdomen is flat. Bowel sounds are normal.      Palpations: Abdomen is soft.   Musculoskeletal:      Cervical back: Neck supple.   Skin:     General: Skin is warm.      Comments: Left lower extremity, area of erythema around her left shin.  Warm to touch.  Neurovascularly intact   Neurological:      General: No focal deficit present.      Mental Status: She is alert and oriented to person, place, and time. Mental status is at baseline.   Psychiatric:         Mood and Affect: Mood normal.         Behavior: Behavior normal.         Thought Content: Thought content normal.         Judgment: Judgment normal.         Laboratory:  Recent Labs     08/08/23 1925   WBC 12.8*   RBC 4.21   HEMOGLOBIN 11.3*   HEMATOCRIT 37.6   MCV 89.3   MCH 26.8*   MCHC 30.1*   RDW 51.5*   PLATELETCT 236   MPV 11.3     Recent Labs     08/08/23 1925   SODIUM 142    POTASSIUM 4.1   CHLORIDE 106   CO2 28   GLUCOSE 164*   BUN 18   CREATININE 1.24   CALCIUM 9.3     Recent Labs     08/08/23 1925   ALTSGPT 15   ASTSGOT 14   ALKPHOSPHAT 118*   TBILIRUBIN 0.7   GLUCOSE 164*         No results for input(s): NTPROBNP in the last 72 hours.      Recent Labs     08/08/23 1925 08/08/23  2250   TROPONINT 38* 37*       Imaging:  DX-KNEE 3 VIEWS LEFT   Final Result      Mild osteophytic degenerative changes of the knee without acute fracture or malalignment.      DX-CHEST-PORTABLE (1 VIEW)   Final Result      No acute cardiopulmonary disease evident.          EKG:  I have personally reviewed the images and compared with prior images.    Assessment/Plan:  Justification for Admission Status  I anticipate this patient is appropriate for observation status at this time because patient has chest pain/cellulitis    Patient will need a Med/Surg bed on MEDICAL service .  The need is secondary to chest pain.    * Cellulitis- (present on admission)  Assessment & Plan  Spoke with ERP.  Patient found to have a mechanical fall yesterday and since then is reporting of erythematous left lower extremity and tenderness.  X-ray left does not demonstrate any acute pathology.  Started on Ancef in ED.  Can continue Ancef.  Monitor for rash and diarrhea from Ancef administration.    MONI (acute kidney injury) (HCC)  Assessment & Plan  Fluids  Avoid nephrotoxic medication  Serial BMP    Elevated troponin- (present on admission)  Assessment & Plan  Noted to have troponin of 38 and 37.  EKG does not demonstrate any ischemic changes.  Loaded with aspirin in ED.  Nitroglycerin as needed.  Stress test in a.m.    Obesity hypoventilation syndrome (HCC)- (present on admission)  Assessment & Plan  Use oxygen at night    HLD - (present on admission)  Assessment & Plan  Continue Lipitor    Schizophrenia (HCC)- (present on admission)  Assessment & Plan  Continue Abilify        VTE prophylaxis: pharmacologic prophylaxis  contraindicated due to stress test in a.m.

## 2023-08-09 NOTE — PROGRESS NOTES
I received a call from Magdalene at [facility].  She informed me that the patient is able to be picked up by [facility] whenever she is discharged from here.  Magdalene said to call [phone] when she is discharged for  arrangements.

## 2023-08-09 NOTE — ED NOTES
Phone report to [place] [id number] [facility]. She will bring tele box so patient can be transported to stress test. Patient will travel from nuclear medicine to [place] [id number].

## 2023-08-09 NOTE — ED NOTES
Med Rec complete per Pt at bedside and pharmacy records.  Allergies reviewed.  Home Pharmacy:  [facility]

## 2023-08-09 NOTE — CARE PLAN
Problem: Pain - Standard  Goal: Alleviation of pain or a reduction in pain to the patient’s comfort goal  Outcome: Progressing     Problem: Knowledge Deficit - Standard  Goal: Patient and family/care givers will demonstrate understanding of plan of care, disease process/condition, diagnostic tests and medications  Outcome: Progressing   The patient is Stable - Low risk of patient condition declining or worsening    Shift Goals  Clinical Goals: stress test, IV abtx, IV fluids  Patient Goals: comfort  Pt is able to voice understanding of her plan of care.  Progress made toward(s) clinical / shift goals:      Patient is not progressing towards the following goals:  N/A

## 2023-08-10 NOTE — PROGRESS NOTES
Chief Complaint   Patient presents with    Arrhythmia     Follow up       Subjective     Lorene Haro is a 47 y.o. female who presents today for clearance for upcoming dental procedure, also follow-up after recent hospitalization for chest pain on 2023.      During her hospitalization troponins were noted to be mildly elevated, however remained flat.as result of this and the stress test was performed which showed no evidence of ischemia or infarction.      Patient has a medical history significant for palpitations, implanted ILR, found to have low burden of A-fib, [id number] of only 1, briefly on OAC but transition to ASA during last visit with Dr. Barraza, hypertension and schizophrenia.      Today in the clinic patient has no cardiac complaints, denies palpitations, chest pain.  She is here to seek clearance for dental procedure, requesting to stop aspirin 7 days prior to having her teeth pulled.     Past Medical History:   Diagnosis Date    A-fib (HCC)     MONI (acute kidney injury) (HCC) 2023    Asthma     Bipolar 2 disorder (HCC)     Chickenpox     Chronic obstructive pulmonary disease (HCC)     Hypertension     On home oxygen therapy     Other psychological stress     Schizophrenic disorder (HCC)     Yeast infection of the skin 2021     Past Surgical History:   Procedure Laterality Date    CHOLECYSTECTOMY      COLECTOMY      HYSTERECTOMY LAPAROSCOPY      KNEE ARTHROSCOPY Left      Family History   Problem Relation Age of Onset    No Known Problems Mother     Cancer Sister      Social History     Socioeconomic History    Marital status: Single     Spouse name: Not on file    Number of children: Not on file    Years of education: Not on file    Highest education level: Not on file   Occupational History    Not on file   Tobacco Use    Smoking status: Former     Packs/day: 0.25     Types: Cigarettes     Quit date: 10/12/2022     Years since quittin.8    Smokeless tobacco: Never   Vaping Use     Vaping Use: Never used   Substance and Sexual Activity    Alcohol use: Not Currently    Drug use: Never    Sexual activity: Not Currently   Other Topics Concern    Not on file   Social History Narrative    ** Merged History Encounter **         From [place]     Social [facility]     Financial Resource Strain: Medium Risk (5/4/2022)    Overall Financial Resource Strain (CARDIA)     Difficulty of Paying Living Expenses: Somewhat hard   Food Insecurity: No Food Insecurity (8/3/2023)    Hunger Vital Sign     Worried About Running Out of Food in the Last Year: Never true     Ran Out of Food in the Last Year: Never true   Transportation Needs: No Transportation Needs (5/4/2022)    PRAPARE - Transportation     Lack of Transportation (Medical): No     Lack of Transportation (Non-Medical): No   Physical Activity: Not on file   Stress: Not on file   Social Connections: Not on file   Intimate Partner Violence: Not on file   Housing Stability: Not on file     Allergies   Allergen Reactions    Penicillin G Rash and Itching     pt reports that she gets a rash all over her body and gets itchy    Amoxicillin Rash and Itching     Tolerates cephalosporins, pt reports that she gets a rash all over her body and gets itchy     Outpatient Encounter Medications as of 8/11/2023   Medication Sig Dispense Refill    fluticasone (FLONASE) 50 MCG/ACT nasal spray       ADVAIR DISKUS 100-50 MCG/ACT AEROSOL POWDER, BREATH ACTIVATED       aspirin 81 MG EC tablet Take 81 mg by mouth every day.      cephALEXin (KEFLEX) 500 MG Cap Take 1 Capsule by mouth 4 times a day for 10 days. 40 Capsule 0    metoprolol SR (TOPROL XL) 25 MG TABLET SR 24 HR Take 25 mg by mouth every day.      albuterol 108 (90 Base) MCG/ACT Aero Soln inhalation aerosol Inhale 2 Puffs every 6 hours as needed for Shortness of Breath. 18 g 0    methocarbamol (ROBAXIN) 750 MG Tab Take 2 Tablets by mouth 3 times a day. 20 Tablet 0    famotidine (PEPCID) 20 MG Tab Take 1  "Tablet by mouth 2 times a day as needed (pain). For upper belly pain 30 Tablet 0    furosemide (LASIX) 20 MG Tab Take 1 Tablet by mouth every day. 90 Tablet 0    aripiprazole (ABILIFY) 30 MG tablet Take 1 Tablet by mouth every morning. Indications: Major Depressive Disorder 30 Tablet 0    atorvastatin (LIPITOR) 20 MG Tab Take 1 Tablet by mouth at bedtime. 30 Tablet 0    montelukast (SINGULAIR) 10 MG Tab Take 1 Tablet by mouth every evening. 30 Tablet 0    [DISCONTINUED] potassium chloride (KLOR-CON) 20 MEQ Pack  (Patient not taking: Reported on 8/11/2023)       No facility-administered encounter medications on file as of 8/11/2023.     Review of Systems   HENT:          Tooth pain   Respiratory:  Negative for shortness of breath.    Cardiovascular:  Negative for chest pain, palpitations, orthopnea and leg swelling.   Neurological:  Negative for dizziness and loss of consciousness.   All other systems reviewed and are negative.             Objective     /68 (BP Location: Left arm, Patient Position: Sitting)   Pulse (!) 56   Resp 16   Ht 1.549 m (5' 1\")   Wt (!) 126 kg (277 lb)   LMP  (LMP Unknown)   SpO2 98%   BMI 52.34 kg/m²     Physical Exam  Vitals reviewed.   Constitutional:       General: She is not in acute distress.     Appearance: She is overweight.      Comments: Ambulating with a walker   Cardiovascular:      Rate and Rhythm: Normal rate and regular rhythm.      Heart sounds: No murmur heard.  Pulmonary:      Effort: Pulmonary effort is normal. No respiratory distress.      Breath sounds: Normal breath sounds. No rhonchi.   Abdominal:      General: There is no distension.      Tenderness: There is no abdominal tenderness.   Musculoskeletal:      Cervical back: Normal range of motion.      Right lower leg: No edema.      Left lower leg: No edema.   Neurological:      General: No focal deficit present.      Mental Status: She is alert.            Lab Results   Component Value Date/Time    " CHOLSTRLTOT 198 05/10/2021 05:42 AM     (H) 05/10/2021 05:42 AM    HDL 48 05/10/2021 05:42 AM    TRIGLYCERIDE 126 05/10/2021 05:42 AM       Lab Results   Component Value Date/Time    SODIUM 142 08/08/2023 07:25 PM    POTASSIUM 4.1 08/08/2023 07:25 PM    CHLORIDE 106 08/08/2023 07:25 PM    CO2 28 08/08/2023 07:25 PM    GLUCOSE 164 (H) 08/08/2023 07:25 PM    BUN 18 08/08/2023 07:25 PM    CREATININE 1.24 08/08/2023 07:25 PM    CREATININE 0.9 09/11/2008 01:45 PM    BUNCREATRAT 7.5 05/19/2022 02:18 AM     Lab Results   Component Value Date/Time    ALKPHOSPHAT 118 (H) 08/08/2023 07:25 PM    ASTSGOT 14 08/08/2023 07:25 PM    ALTSGPT 15 08/08/2023 07:25 PM    TBILIRUBIN 0.7 08/08/2023 07:25 PM      Nuclear medicine stress test 8/9/2023  NUCLEAR IMAGING INTERPRETATION   No evidence of significant jeopardized viable myocardium or prior myocardial    infarction.   Normal left ventricular size, ejection fraction, and wall motion.   ECG INTERPRETATION   No ischemic changes with stress compared to baseline ECG.    Transthoracic echocardiogram 10/1/2022  CONCLUSIONS  Normal left ventricular size, wall thickness, and systolic function.  Prior echocardiogram 2/27/2022, now without evidence of intracavitary   gradient.    Assessment & Plan     1. Paroxysmal atrial fibrillation (HCC)        2. Obesity hypoventilation syndrome (HCC)        3. Class 3 severe obesity due to excess calories in adult, unspecified BMI, unspecified whether serious comorbidity present (HCC)        4. Mixed restrictive and obstructive lung disease (HCC)        5. KATHIA (obstructive sleep apnea)        6. Morbid obesity with alveolar hypoventilation (HCC)        7. Tobacco abuse, in remission        8. Undifferentiated schizophrenia (HCC)            Medical Decision Making: Today's Assessment/Status/Plan:        Surgical clearance.  Her oral surgeon is requesting clearance that patient be off her aspirin therapy for 7 days prior to dental extractions.   Patient has no history of CAD or stents in place is on ASA cilia as a preventative measure.  No issues with holding aspirin as long as needed for dental procedure.    Patient does have a history of low burden of A-fib, does have a ILR in place.  Discussed that at this point her [id number] is only 1 and does not OAC.  However risk score may need to be revisited.  Remains asymptomatic.    Hypertension.  Well-controlled with current regimen.    Leg swelling.  Patient on Lasix, potassium was stopped during hospitalization as her potassium was higher.  She does have upcoming appointment with vascular surgery next week to evaluate her bilateral lower extremity edema.    Follow-up with cardiology annually or as needed.    Sisi Rosen, MSN, APRN  [facility]  [phone]    Please note this dictation was created using voice recognition software.  I have made every reasonable attempt to correct obvious errors, but there may be errors of grammar and possibly content that I did not discover before finalizing the note.

## 2023-08-10 NOTE — DISCHARGE SUMMARY
"Discharge Summary    CHIEF COMPLAINT ON ADMISSION  Chief Complaint   Patient presents with    T-5000 GLF     Yesterday while in the shower, pt slipped and fell to the ground. -LOC. -headstrike. Pt states she hit her L knee and has been having L knee pain since.     Chest Pain     Central, non-radiating chest pain described as \"dull.\"        Reason for Admission  Dizzyness/Leg Pain     Admission Date  8/8/2023    CODE STATUS  Full Code    HPI & HOSPITAL COURSE  Lorene Haro is a 47 y.o. female who presented 8/8/2023 with chest pain.  Patient reports a constant pressure-like chest pain for the last 24 hours.  Describes as substernal and worse upon deep inspiration.  In addition, she did have a mechanical fall yesterday and fell on her left knee.  Since then her left knee has become more tender and erythematous, prompting her to come to ER for evaluation.     Patient has a history of chronic respiratory failure on home O2, paroxysmal A-fib with implantable loop recorder, hypertension, schizophrenia, GERD     In ER, patient found to have normal vital signs.  Pertinent labs include neutrophil leukocytosis, MONI, troponin 38 and 37.  Chest x-ray showing no acute cardiopulmonary abnormality.  X-ray left knee negative for acute pathology.  EKG showing normal sinus rhythm with no ischemic changes. She did have a mild erythema to left shin diagnosed as cellulitis and started on Ancef in ED. She was admitted to hospitalist service for chest pain evaluation.     Serial troponins flat, 38-37-38-37. She appears to have similar chronic mild elevation on chart review. EKG sinus rhythm without changes of ischemia. NM stress test performed, no evidence of significant jeopardized viable myocardium or prior myocardial infarction. Normal LV size, EF, and wall motion. No ischemic EKG changes with stress. Her WBCs improved 12.8 to 8.8 on Ancef, erythema resolving as well.     As cardiac workup negative, she is clear to discharge to " outpatient followup. She is responding well to Ancef, will transition her cellulitis regimen to PO cephalexin 500mg 4 times a day for 10 days. Recommend follow up with PCP in 3-5 days. Return to hospital for fever, chills, increased swelling/redness, drainage, or increasing chest pain / shortness of breath.     Therefore, she is discharged in good and stable condition to home with close outpatient follow-up.    The patient recovered much more quickly than anticipated on admission.    Discharge Date  8/9/2023    FOLLOW UP ITEMS POST DISCHARGE  Cephalexin 500mg 4 times daily for 10 days  Follow with PCP    DISCHARGE DIAGNOSES  Principal Problem:    Cellulitis (POA: Yes)  Active Problems:    Schizophrenia (HCC) (POA: Yes)    HLD  (POA: Yes)    Obesity hypoventilation syndrome (HCC) (POA: Yes)  Resolved Problems:    Elevated troponin (POA: Yes)    MONI (acute kidney injury) (HCC) (POA: Unknown)      FOLLOW UP  Future Appointments   Date Time Provider Department Center   8/14/2023  3:45 PM [facility] EXAM 4 [facility] None   10/19/2023  2:40 PM Oz Borrego M.D. [facility] None       MEDICATIONS ON DISCHARGE     Medication List        START taking these medications        Instructions   cephALEXin 500 MG Caps  Commonly known as: Keflex   Take 1 Capsule by mouth 4 times a day for 10 days.  Dose: 500 mg            CONTINUE taking these medications        Instructions   aripiprazole 30 MG tablet  Commonly known as: Abilify   Take 1 Tablet by mouth every morning. Indications: Major Depressive Disorder  Dose: 30 mg     atorvastatin 20 MG Tabs  Commonly known as: Lipitor   Take 1 Tablet by mouth at bedtime.  Dose: 20 mg     famotidine 20 MG Tabs  Commonly known as: Pepcid   Take 1 Tablet by mouth 2 times a day as needed (pain). For upper belly pain  Dose: 20 mg     furosemide 20 MG Tabs  Commonly known as: Lasix   Take 1 Tablet by mouth every day.  Dose: 20 mg     methocarbamol 750 MG Tabs  Commonly known as: Robaxin   Take 2 Tablets by  mouth 3 times a day.  Dose: 1,500 mg     metoprolol SR 25 MG Tb24  Commonly known as: Toprol XL   Take 25 mg by mouth every day.  Dose: 25 mg     montelukast 10 MG Tabs  Commonly known as: Singulair   Take 1 Tablet by mouth every evening.  Dose: 10 mg     Ventolin  (90 Base) MCG/ACT Aers inhalation aerosol  Generic drug: albuterol   Inhale 2 Puffs every 6 hours as needed for Shortness of Breath.  Dose: 2 Puff              Allergies  Allergies   Allergen Reactions    Penicillin G Rash and Itching     pt reports that she gets a rash all over her body and gets itchy    Amoxicillin Rash and Itching     Tolerates cephalosporins, pt reports that she gets a rash all over her body and gets itchy       DIET  Orders Placed This Encounter   Procedures    Diet Order Diet: Consistent CHO (Diabetic)     Standing Status:   Standing     Number of Occurrences:   1     Order Specific Question:   Diet:     Answer:   Consistent CHO (Diabetic) [4]       ACTIVITY  As tolerated.  Weight bearing as tolerated    CONSULTATIONS  none    PROCEDURES  none    LABORATORY  Lab Results   Component Value Date    SODIUM 142 08/08/2023    POTASSIUM 4.1 08/08/2023    CHLORIDE 106 08/08/2023    CO2 28 08/08/2023    GLUCOSE 164 (H) 08/08/2023    BUN 18 08/08/2023    CREATININE 1.24 08/08/2023    CREATININE 0.9 09/11/2008        Lab Results   Component Value Date    WBC 8.8 08/09/2023    HEMOGLOBIN 10.8 (L) 08/09/2023    HEMATOCRIT 35.8 (L) 08/09/2023    PLATELETCT 214 08/09/2023        Total time of the discharge process exceeds 30 minutes.

## 2023-08-10 NOTE — DISCHARGE INSTRUCTIONS
Discharge Instructions    Discharged to group home by taxi with self. Discharged via wheelchair, hospital escort: Yes.  Special equipment needed: Not Applicable    Be sure to schedule a follow-up appointment with your primary care doctor or any specialists as instructed.     Discharge Plan:   Diet Plan: Discussed  Activity Level: Discussed  Confirmed Follow up Appointment: Patient to Call and Schedule Appointment  Confirmed Symptoms Management: Discussed  Medication Reconciliation Updated: Yes    I understand that a diet low in cholesterol, fat, and sodium is recommended for good health. Unless I have been given specific instructions below for another diet, I accept this instruction as my diet prescription.   Other diet: Consistent Carb    Special Instructions: None    -Is this patient being discharged with medication to prevent blood clots?  No    Is patient discharged on Warfarin / Coumadin?   No

## 2023-08-10 NOTE — HOSPITAL COURSE
Lorene Haro is a 47 y.o. female who presented 8/8/2023 with chest pain.  Patient reports a constant pressure-like chest pain for the last 24 hours.  Describes as substernal and worse upon deep inspiration.  In addition, she did have a mechanical fall yesterday and fell on her left knee.  Since then her left knee has become more tender and erythematous, prompting her to come to ER for evaluation.     Patient has a history of chronic respiratory failure on home O2, paroxysmal A-fib with implantable loop recorder, hypertension, schizophrenia, GERD     In ER, patient found to have normal vital signs.  Pertinent labs include neutrophil leukocytosis, MONI, troponin 38 and 37.  Chest x-ray showing no acute cardiopulmonary abnormality.  X-ray left knee negative for acute pathology.  EKG showing normal sinus rhythm with no ischemic changes. She did have a mild erythema to left shin diagnosed as cellulitis and started on Ancef in ED. She was admitted to hospitalist service for chest pain evaluation.     Serial troponins flat, 38-37-38-37. She appears to have similar chronic mild elevation on chart review. EKG sinus rhythm without changes of ischemia. NM stress test performed, no evidence of significant jeopardized viable myocardium or prior myocardial infarction. Normal LV size, EF, and wall motion. No ischemic EKG changes with stress. Her WBCs improved 12.8 to 8.8 on Ancef, erythema resolving as well.     As cardiac workup negative, she is clear to discharge to outpatient followup. She is responding well to Ancef, will transition her cellulitis regimen to PO cephalexin 500mg 4 times a day for 10 days. Recommend follow up with PCP in 3-5 days. Return to hospital for fever, chills, increased swelling/redness, drainage, or increasing chest pain / shortness of breath.

## 2023-08-11 ENCOUNTER — OFFICE VISIT (OUTPATIENT)
Dept: CARDIOLOGY | Facility: MEDICAL CENTER | Age: 48
End: 2023-08-11
Attending: NURSE PRACTITIONER
Payer: MEDICAID

## 2023-08-11 VITALS
DIASTOLIC BLOOD PRESSURE: 68 MMHG | HEIGHT: 61 IN | WEIGHT: 277 LBS | BODY MASS INDEX: 52.3 KG/M2 | OXYGEN SATURATION: 98 % | RESPIRATION RATE: 16 BRPM | HEART RATE: 56 BPM | SYSTOLIC BLOOD PRESSURE: 114 MMHG

## 2023-08-11 DIAGNOSIS — J43.9 MIXED RESTRICTIVE AND OBSTRUCTIVE LUNG DISEASE (HCC): ICD-10-CM

## 2023-08-11 DIAGNOSIS — G47.33 OSA (OBSTRUCTIVE SLEEP APNEA): ICD-10-CM

## 2023-08-11 DIAGNOSIS — E66.01 CLASS 3 SEVERE OBESITY DUE TO EXCESS CALORIES IN ADULT, UNSPECIFIED BMI, UNSPECIFIED WHETHER SERIOUS COMORBIDITY PRESENT (HCC): ICD-10-CM

## 2023-08-11 DIAGNOSIS — J98.4 MIXED RESTRICTIVE AND OBSTRUCTIVE LUNG DISEASE (HCC): ICD-10-CM

## 2023-08-11 DIAGNOSIS — E66.2 OBESITY HYPOVENTILATION SYNDROME (HCC): ICD-10-CM

## 2023-08-11 DIAGNOSIS — F17.201 TOBACCO ABUSE, IN REMISSION: ICD-10-CM

## 2023-08-11 DIAGNOSIS — F20.3 UNDIFFERENTIATED SCHIZOPHRENIA (HCC): ICD-10-CM

## 2023-08-11 DIAGNOSIS — I48.0 PAROXYSMAL ATRIAL FIBRILLATION (HCC): ICD-10-CM

## 2023-08-11 DIAGNOSIS — E66.2 MORBID OBESITY WITH ALVEOLAR HYPOVENTILATION (HCC): ICD-10-CM

## 2023-08-11 PROCEDURE — 99214 OFFICE O/P EST MOD 30 MIN: CPT | Performed by: NURSE PRACTITIONER

## 2023-08-11 PROCEDURE — 99212 OFFICE O/P EST SF 10 MIN: CPT | Performed by: NURSE PRACTITIONER

## 2023-08-11 PROCEDURE — 99213 OFFICE O/P EST LOW 20 MIN: CPT | Performed by: NURSE PRACTITIONER

## 2023-08-11 PROCEDURE — 3078F DIAST BP <80 MM HG: CPT | Performed by: NURSE PRACTITIONER

## 2023-08-11 PROCEDURE — 3074F SYST BP LT 130 MM HG: CPT | Performed by: NURSE PRACTITIONER

## 2023-08-11 RX ORDER — POTASSIUM CHLORIDE 1.5 G/1.58G
POWDER, FOR SOLUTION ORAL
COMMUNITY
Start: 2023-08-08 | End: 2023-08-11

## 2023-08-11 RX ORDER — FLUTICASONE PROPIONATE 50 MCG
2 SPRAY, SUSPENSION (ML) NASAL EVERY MORNING
COMMUNITY
Start: 2023-08-08

## 2023-08-11 RX ORDER — ASPIRIN 81 MG/1
81 TABLET ORAL DAILY
COMMUNITY
End: 2024-01-17

## 2023-08-11 RX ORDER — FLUTICASONE PROPIONATE AND SALMETEROL 50; 100 UG/1; UG/1
1 POWDER RESPIRATORY (INHALATION) 2 TIMES DAILY
COMMUNITY
Start: 2023-08-08

## 2023-08-11 ASSESSMENT — ENCOUNTER SYMPTOMS
PALPITATIONS: 0
ORTHOPNEA: 0
LOSS OF CONSCIOUSNESS: 0
SHORTNESS OF BREATH: 0
DIZZINESS: 0

## 2023-08-11 ASSESSMENT — FIBROSIS 4 INDEX: FIB4 SCORE: 0.83

## 2023-08-14 ENCOUNTER — ANTICOAGULATION VISIT (OUTPATIENT)
Dept: VASCULAR LAB | Facility: MEDICAL CENTER | Age: 48
End: 2023-08-14
Attending: INTERNAL MEDICINE
Payer: MEDICAID

## 2023-08-14 DIAGNOSIS — I48.0 PAROXYSMAL ATRIAL FIBRILLATION (HCC): ICD-10-CM

## 2023-08-14 DIAGNOSIS — D68.69 SECONDARY HYPERCOAGULABLE STATE (HCC): ICD-10-CM

## 2023-08-14 PROCEDURE — 99211 OFF/OP EST MAY X REQ PHY/QHP: CPT

## 2023-08-14 NOTE — PROGRESS NOTES
Please see cardiology note from 5/17/23. Dr Barraza    F/u for anticoagulation, patient no longer on anticoagulation per Dr. Barraza and no further follow-up needed from this clinic.     Luis Enrique Iglesias, HildaD

## 2023-08-17 ENCOUNTER — HOSPITAL ENCOUNTER (EMERGENCY)
Facility: MEDICAL CENTER | Age: 48
End: 2023-08-17
Attending: EMERGENCY MEDICINE
Payer: MEDICAID

## 2023-08-17 ENCOUNTER — APPOINTMENT (OUTPATIENT)
Dept: RADIOLOGY | Facility: MEDICAL CENTER | Age: 48
End: 2023-08-17
Attending: EMERGENCY MEDICINE
Payer: MEDICAID

## 2023-08-17 VITALS
TEMPERATURE: 98 F | OXYGEN SATURATION: 92 % | HEART RATE: 70 BPM | DIASTOLIC BLOOD PRESSURE: 57 MMHG | WEIGHT: 275.13 LBS | HEIGHT: 61 IN | BODY MASS INDEX: 51.95 KG/M2 | SYSTOLIC BLOOD PRESSURE: 110 MMHG | RESPIRATION RATE: 19 BRPM

## 2023-08-17 DIAGNOSIS — J06.9 UPPER RESPIRATORY TRACT INFECTION, UNSPECIFIED TYPE: ICD-10-CM

## 2023-08-17 LAB
ALBUMIN SERPL BCP-MCNC: 4 G/DL (ref 3.2–4.9)
ALBUMIN/GLOB SERPL: 1.4 G/DL
ALP SERPL-CCNC: 130 U/L (ref 30–99)
ALT SERPL-CCNC: 10 U/L (ref 2–50)
ANION GAP SERPL CALC-SCNC: 9 MMOL/L (ref 7–16)
AST SERPL-CCNC: 13 U/L (ref 12–45)
BASOPHILS # BLD AUTO: 0.2 % (ref 0–1.8)
BASOPHILS # BLD: 0.02 K/UL (ref 0–0.12)
BILIRUB SERPL-MCNC: 0.7 MG/DL (ref 0.1–1.5)
BUN SERPL-MCNC: 16 MG/DL (ref 8–22)
CALCIUM ALBUM COR SERPL-MCNC: 9.3 MG/DL (ref 8.5–10.5)
CALCIUM SERPL-MCNC: 9.3 MG/DL (ref 8.5–10.5)
CHLORIDE SERPL-SCNC: 104 MMOL/L (ref 96–112)
CO2 SERPL-SCNC: 30 MMOL/L (ref 20–33)
CREAT SERPL-MCNC: 1.03 MG/DL (ref 0.5–1.4)
EKG IMPRESSION: NORMAL
EOSINOPHIL # BLD AUTO: 0.12 K/UL (ref 0–0.51)
EOSINOPHIL NFR BLD: 1.4 % (ref 0–6.9)
ERYTHROCYTE [DISTWIDTH] IN BLOOD BY AUTOMATED COUNT: 48.3 FL (ref 35.9–50)
GFR SERPLBLD CREATININE-BSD FMLA CKD-EPI: 66 ML/MIN/1.73 M 2
GLOBULIN SER CALC-MCNC: 2.9 G/DL (ref 1.9–3.5)
GLUCOSE SERPL-MCNC: 106 MG/DL (ref 65–99)
HCT VFR BLD AUTO: 38.3 % (ref 37–47)
HGB BLD-MCNC: 11.7 G/DL (ref 12–16)
IMM GRANULOCYTES # BLD AUTO: 0.03 K/UL (ref 0–0.11)
IMM GRANULOCYTES NFR BLD AUTO: 0.4 % (ref 0–0.9)
LYMPHOCYTES # BLD AUTO: 1.59 K/UL (ref 1–4.8)
LYMPHOCYTES NFR BLD: 18.9 % (ref 22–41)
MCH RBC QN AUTO: 26.7 PG (ref 27–33)
MCHC RBC AUTO-ENTMCNC: 30.5 G/DL (ref 32.2–35.5)
MCV RBC AUTO: 87.2 FL (ref 81.4–97.8)
MONOCYTES # BLD AUTO: 0.45 K/UL (ref 0–0.85)
MONOCYTES NFR BLD AUTO: 5.3 % (ref 0–13.4)
NEUTROPHILS # BLD AUTO: 6.21 K/UL (ref 1.82–7.42)
NEUTROPHILS NFR BLD: 73.8 % (ref 44–72)
NRBC # BLD AUTO: 0 K/UL
NRBC BLD-RTO: 0 /100 WBC (ref 0–0.2)
PLATELET # BLD AUTO: 241 K/UL (ref 164–446)
PMV BLD AUTO: 10.8 FL (ref 9–12.9)
POTASSIUM SERPL-SCNC: 4.4 MMOL/L (ref 3.6–5.5)
PROT SERPL-MCNC: 6.9 G/DL (ref 6–8.2)
RBC # BLD AUTO: 4.39 M/UL (ref 4.2–5.4)
SODIUM SERPL-SCNC: 143 MMOL/L (ref 135–145)
TROPONIN T SERPL-MCNC: 31 NG/L (ref 6–19)
TROPONIN T SERPL-MCNC: 35 NG/L (ref 6–19)
WBC # BLD AUTO: 8.4 K/UL (ref 4.8–10.8)

## 2023-08-17 PROCEDURE — 93005 ELECTROCARDIOGRAM TRACING: CPT | Performed by: EMERGENCY MEDICINE

## 2023-08-17 PROCEDURE — 85025 COMPLETE CBC W/AUTO DIFF WBC: CPT

## 2023-08-17 PROCEDURE — 84484 ASSAY OF TROPONIN QUANT: CPT

## 2023-08-17 PROCEDURE — 93005 ELECTROCARDIOGRAM TRACING: CPT

## 2023-08-17 PROCEDURE — 99285 EMERGENCY DEPT VISIT HI MDM: CPT

## 2023-08-17 PROCEDURE — 71045 X-RAY EXAM CHEST 1 VIEW: CPT

## 2023-08-17 PROCEDURE — 36415 COLL VENOUS BLD VENIPUNCTURE: CPT

## 2023-08-17 PROCEDURE — 80053 COMPREHEN METABOLIC PANEL: CPT

## 2023-08-17 RX ORDER — ALBUTEROL SULFATE 90 UG/1
2 AEROSOL, METERED RESPIRATORY (INHALATION) EVERY 6 HOURS PRN
Qty: 8.5 G | Refills: 0 | Status: SHIPPED | OUTPATIENT
Start: 2023-08-17

## 2023-08-17 ASSESSMENT — FIBROSIS 4 INDEX: FIB4 SCORE: 0.83

## 2023-08-17 ASSESSMENT — LIFESTYLE VARIABLES: DO YOU DRINK ALCOHOL: NO

## 2023-08-17 NOTE — ED PROVIDER NOTES
ED Provider Note    CHIEF COMPLAINT  Chief Complaint   Patient presents with    Chest Pain     Since Saturday. Intermittent.     Cough     X3 days, states she has a yellow phlegm.        EXTERNAL RECORDS REVIEWED  In Patient notes    HPI/ROS  LIMITATION TO HISTORY   None  OUTSIDE HISTORIAN(S):  None    Lorene Haro is a 47 y.o. female who presents here for evaluation of cough and congestion.  Patient states she has had cough over the last 3 days, with some chest tightness.  She has no fever chills or vomiting, she has no diaphoresis.  She has no back pain, or abdominal pain.  Patient states that she only has chest tightness with cough.  She has productive yellow phlegm.  Patient was seen and evaluated recently and had a cardiac work-up including stress test.  Stress test was negative for any acute finding.    PAST MEDICAL HISTORY   has a past medical history of A-fib ([facility]), MONI (acute kidney injury) ([facility]) (2023), Asthma, Bipolar 2 disorder (HCC), Chickenpox, Chronic obstructive pulmonary disease (HCC), Hypertension, On home oxygen therapy, Other psychological stress, Schizophrenic disorder (HCC), and Yeast infection of the skin (2021).    SURGICAL HISTORY   has a past surgical history that includes hysterectomy laparoscopy; colectomy; cholecystectomy; and knee arthroscopy (Left).    FAMILY HISTORY  Family History   Problem Relation Age of Onset    No Known Problems Mother     Cancer Sister        SOCIAL HISTORY  Social History     Tobacco Use    Smoking status: Former     Packs/day: .25     Types: Cigarettes     Quit date: 10/12/2022     Years since quittin.8    Smokeless tobacco: Never   Vaping Use    Vaping Use: Never used   Substance and Sexual Activity    Alcohol use: Not Currently    Drug use: Never    Sexual activity: Not Currently       CURRENT MEDICATIONS  Home Medications       Reviewed by Louie France R.N. (Registered Nurse) on 23 at 1011  Med List Status: Partial     Medication  "Last Dose Status   ADVAIR DISKUS 100-50 MCG/ACT AEROSOL POWDER, BREATH ACTIVATED  Active   albuterol 108 (90 Base) MCG/ACT Aero Soln inhalation aerosol  Active   aripiprazole (ABILIFY) 30 MG tablet  Active   aspirin 81 MG EC tablet  Active   atorvastatin (LIPITOR) 20 MG Tab  Active   cephALEXin (KEFLEX) 500 MG Cap  Active   famotidine (PEPCID) 20 MG Tab  Active   fluticasone (FLONASE) 50 MCG/ACT nasal spray  Active   furosemide (LASIX) 20 MG Tab  Active   methocarbamol (ROBAXIN) 750 MG Tab  Active   metoprolol SR (TOPROL XL) 25 MG TABLET SR 24 HR  Active   montelukast (SINGULAIR) 10 MG Tab  Active                    ALLERGIES  Allergies   Allergen Reactions    Penicillin G Rash and Itching     pt reports that she gets a rash all over her body and gets itchy    Amoxicillin Rash and Itching     Tolerates cephalosporins, pt reports that she gets a rash all over her body and gets itchy       PHYSICAL EXAM  VITAL SIGNS: /67   Pulse 60   Temp 36.2 °C (97.1 °F) (Temporal)   Resp 16   Ht 1.549 m (5' 1\")   Wt 125 kg (275 lb 2.2 oz)   LMP  (LMP Unknown)   SpO2 92%   BMI 51.99 kg/m²    Constitutional: Well developed, well nourished. No acute distress.  HEENT: Normocephalic, atraumatic. Posterior pharynx clear and moist.  Eyes:  EOMI. Normal sclera.  Neck: Supple, Full range of motion, nontender.  Chest/Pulmonary:  diminished breath sounds, equal expansion  Cardio: Regular rate and rhythm with no murmur.   Abdomen: Soft, nontender. No peritoneal signs. No guarding. No palpable masses.  Musculoskeletal: No deformity, no edema, neurovascular intact.   Neuro: Clear speech, appropriate, cooperative, cranial nerves II-XII grossly intact.  Psych: Normal mood and affect      DIAGNOSTIC STUDIES / PROCEDURES  Results for orders placed or performed during the hospital encounter of 08/17/23   CBC with Differential   Result Value Ref Range    WBC 8.4 4.8 - 10.8 K/uL    RBC 4.39 4.20 - 5.40 M/uL    Hemoglobin 11.7 (L) 12.0 - " 16.0 g/dL    Hematocrit 38.3 37.0 - 47.0 %    MCV 87.2 81.4 - 97.8 fL    MCH 26.7 (L) 27.0 - 33.0 pg    MCHC 30.5 (L) 32.2 - 35.5 g/dL    RDW 48.3 35.9 - 50.0 fL    Platelet Count 241 164 - 446 K/uL    MPV 10.8 9.0 - 12.9 fL    Neutrophils-Polys 73.80 (H) 44.00 - 72.00 %    Lymphocytes 18.90 (L) 22.00 - 41.00 %    Monocytes 5.30 0.00 - 13.40 %    Eosinophils 1.40 0.00 - 6.90 %    Basophils 0.20 0.00 - 1.80 %    Immature Granulocytes 0.40 0.00 - 0.90 %    Nucleated RBC 0.00 0.00 - 0.20 /100 WBC    Neutrophils (Absolute) 6.21 1.82 - 7.42 K/uL    Lymphs (Absolute) 1.59 1.00 - 4.80 K/uL    Monos (Absolute) 0.45 0.00 - 0.85 K/uL    Eos (Absolute) 0.12 0.00 - 0.51 K/uL    Baso (Absolute) 0.02 0.00 - 0.12 K/uL    Immature Granulocytes (abs) 0.03 0.00 - 0.11 K/uL    NRBC (Absolute) 0.00 K/uL   Complete Metabolic Panel (CMP)   Result Value Ref Range    Sodium 143 135 - 145 mmol/L    Potassium 4.4 3.6 - 5.5 mmol/L    Chloride 104 96 - 112 mmol/L    Co2 30 20 - 33 mmol/L    Anion Gap 9.0 7.0 - 16.0    Glucose 106 (H) 65 - 99 mg/dL    Bun 16 8 - 22 mg/dL    Creatinine 1.03 0.50 - 1.40 mg/dL    Calcium 9.3 8.5 - 10.5 mg/dL    Correct Calcium 9.3 8.5 - 10.5 mg/dL    AST(SGOT) 13 12 - 45 U/L    ALT(SGPT) 10 2 - 50 U/L    Alkaline Phosphatase 130 (H) 30 - 99 U/L    Total Bilirubin 0.7 0.1 - 1.5 mg/dL    Albumin 4.0 3.2 - 4.9 g/dL    Total Protein 6.9 6.0 - 8.2 g/dL    Globulin 2.9 1.9 - 3.5 g/dL    A-G Ratio 1.4 g/dL   Troponins NOW   Result Value Ref Range    Troponin T 31 (H) 6 - 19 ng/L   Troponins in two (2) hours   Result Value Ref Range    Troponin T 35 (H) 6 - 19 ng/L   ESTIMATED GFR   Result Value Ref Range    GFR (CKD-EPI) 66 >60 mL/min/1.73 m 2   EKG   Result Value Ref Range    Report       [facility] Emergency Dept.    Test Date:  2023-08-17  Pt Name:    ADELINA MILLAN                Department: ER  MRN:        [id number]                      Room:  Gender:     Female                       Technician:  [id number]  :        1975                   Requested By:ER TRIAGE PROTOCOL  Order #:    [id number]                    Reading MD:    Measurements  Intervals                                Axis  Rate:       59                           P:          55  VA:         137                          QRS:        43  QRSD:       102                          T:          10  QT:         403  QTc:        400    Interpretive Statements  Sinus bradycardia  Low voltage, precordial leads  Compared to ECG 2023 19:08:17  Low QRS voltage now present  Sinus rhythm no longer present  T-wave abnormality no longer present       EKG; normal sinus rhythm at a rate of 59.  No ST elevation, no ST depression.  QTc is 400.  Compared to EKG from 2023.      RADIOLOGY  I have independently interpreted the diagnostic imaging associated with this visit and am waiting the final reading from the radiologist.   My preliminary interpretation is as follows: see below  Radiologist interpretation:   DX-CHEST-PORTABLE (1 VIEW)   Final Result      No acute cardiac or pulmonary abnormalities are identified. Lung volumes are low.            COURSE & MEDICAL DECISION MAKING      INITIAL ASSESSMENT, COURSE AND PLAN  Care Narrative: This is a 47-year-old female here for evaluation of chest tightness with cough.  Her symptoms appear to be more upper respiratory related, as she has a chronically elevated troponin, and a negative stress test as of 2023.  See below.     Myocardial Perfusion   Report   NUCLEAR IMAGING INTERPRETATION   No evidence of significant jeopardized viable myocardium or prior myocardial    infarction.   Normal left ventricular size, ejection fraction, and wall motion.   ECG INTERPRETATION   No ischemic changes with stress compared to baseline ECG.           DISPOSITION AND DISCUSSIONS  I have discussed management of the patient with the following physicians and JAY's: cardiology;  I spoke to cards, on call.  We talked about the  pts trops, normal recent stress, etc.  He sees no reason for her to continue a work up here, and I agree. Pt always has elevated trops, an unchanged ekg, and a story c/w viral illness.  I will have her follow up outpatient.     Discussion of management with other QHP or appropriate source(s): None    Escalation of care considered, and ultimately not performed: None    Barriers to care at this time, including but not limited to: Patient does not have established PCP.     Decision tools and prescription drugs considered including, but not limited to:  None .    FINAL DIAGNOSIS  URI       Electronically signed by: Esequiel Ko D.O., 8/17/2023 12:38 PM

## 2023-08-17 NOTE — ED NOTES
Pt ambulatory to [address] from [facility] with walker. Pt connected to monitor, given call light, and given warm blankets. Chart up for ERP.

## 2023-08-17 NOTE — ED NOTES
All discharge instructions given to pt.   Pt verbalized understanding of all discharge instructions.  All questions answered. All personal belongings sent with pt. Pt ambulatory to [facility].

## 2023-08-17 NOTE — ED TRIAGE NOTES
"Chief Complaint   Patient presents with    Chest Pain     Since Saturday. Intermittent.     Cough     X3 days, states she has a yellow phlegm.      BIB EMS to triage for above. No interventions.   Protocol ordered.     /59   Pulse 64   Temp 36.2 °C (97.1 °F) (Temporal)   Resp 16   Ht 1.549 m (5' 1\")   Wt 125 kg (275 lb 2.2 oz)   LMP  (LMP Unknown)   SpO2 99%   BMI 51.99 kg/m²     "

## 2023-08-27 ENCOUNTER — NON-PROVIDER VISIT (OUTPATIENT)
Dept: CARDIOLOGY | Facility: MEDICAL CENTER | Age: 48
End: 2023-08-27
Payer: MEDICAID

## 2023-08-27 ENCOUNTER — HOSPITAL ENCOUNTER (EMERGENCY)
Facility: MEDICAL CENTER | Age: 48
End: 2023-08-27
Attending: EMERGENCY MEDICINE
Payer: MEDICAID

## 2023-08-27 ENCOUNTER — APPOINTMENT (OUTPATIENT)
Dept: RADIOLOGY | Facility: MEDICAL CENTER | Age: 48
End: 2023-08-27
Attending: EMERGENCY MEDICINE
Payer: MEDICAID

## 2023-08-27 VITALS
TEMPERATURE: 97 F | HEART RATE: 64 BPM | OXYGEN SATURATION: 96 % | SYSTOLIC BLOOD PRESSURE: 113 MMHG | DIASTOLIC BLOOD PRESSURE: 55 MMHG | RESPIRATION RATE: 16 BRPM | BODY MASS INDEX: 51.82 KG/M2 | WEIGHT: 274.47 LBS | HEIGHT: 61 IN

## 2023-08-27 DIAGNOSIS — J18.9 PNEUMONIA OF LEFT LOWER LOBE DUE TO INFECTIOUS ORGANISM: ICD-10-CM

## 2023-08-27 DIAGNOSIS — R07.9 CHEST PAIN, UNSPECIFIED TYPE: ICD-10-CM

## 2023-08-27 LAB
ALBUMIN SERPL BCP-MCNC: 4.2 G/DL (ref 3.2–4.9)
ALBUMIN/GLOB SERPL: 1.4 G/DL
ALP SERPL-CCNC: 135 U/L (ref 30–99)
ALT SERPL-CCNC: 15 U/L (ref 2–50)
ANION GAP SERPL CALC-SCNC: 10 MMOL/L (ref 7–16)
AST SERPL-CCNC: 13 U/L (ref 12–45)
BASOPHILS # BLD AUTO: 0.1 % (ref 0–1.8)
BASOPHILS # BLD: 0.01 K/UL (ref 0–0.12)
BILIRUB SERPL-MCNC: 0.7 MG/DL (ref 0.1–1.5)
BUN SERPL-MCNC: 19 MG/DL (ref 8–22)
CALCIUM ALBUM COR SERPL-MCNC: 9.1 MG/DL (ref 8.5–10.5)
CALCIUM SERPL-MCNC: 9.3 MG/DL (ref 8.5–10.5)
CHLORIDE SERPL-SCNC: 104 MMOL/L (ref 96–112)
CO2 SERPL-SCNC: 27 MMOL/L (ref 20–33)
CREAT SERPL-MCNC: 1.09 MG/DL (ref 0.5–1.4)
EKG IMPRESSION: NORMAL
EKG IMPRESSION: NORMAL
EOSINOPHIL # BLD AUTO: 0.11 K/UL (ref 0–0.51)
EOSINOPHIL NFR BLD: 0.9 % (ref 0–6.9)
ERYTHROCYTE [DISTWIDTH] IN BLOOD BY AUTOMATED COUNT: 46.8 FL (ref 35.9–50)
GFR SERPLBLD CREATININE-BSD FMLA CKD-EPI: 62 ML/MIN/1.73 M 2
GLOBULIN SER CALC-MCNC: 3 G/DL (ref 1.9–3.5)
GLUCOSE SERPL-MCNC: 117 MG/DL (ref 65–99)
HCT VFR BLD AUTO: 40.2 % (ref 37–47)
HGB BLD-MCNC: 12.3 G/DL (ref 12–16)
IMM GRANULOCYTES # BLD AUTO: 0.04 K/UL (ref 0–0.11)
IMM GRANULOCYTES NFR BLD AUTO: 0.3 % (ref 0–0.9)
LYMPHOCYTES # BLD AUTO: 2.02 K/UL (ref 1–4.8)
LYMPHOCYTES NFR BLD: 16.6 % (ref 22–41)
MCH RBC QN AUTO: 26.3 PG (ref 27–33)
MCHC RBC AUTO-ENTMCNC: 30.6 G/DL (ref 32.2–35.5)
MCV RBC AUTO: 85.9 FL (ref 81.4–97.8)
MONOCYTES # BLD AUTO: 0.64 K/UL (ref 0–0.85)
MONOCYTES NFR BLD AUTO: 5.3 % (ref 0–13.4)
NEUTROPHILS # BLD AUTO: 9.35 K/UL (ref 1.82–7.42)
NEUTROPHILS NFR BLD: 76.8 % (ref 44–72)
NRBC # BLD AUTO: 0 K/UL
NRBC BLD-RTO: 0 /100 WBC (ref 0–0.2)
PLATELET # BLD AUTO: 265 K/UL (ref 164–446)
PMV BLD AUTO: 10.9 FL (ref 9–12.9)
POTASSIUM SERPL-SCNC: 4 MMOL/L (ref 3.6–5.5)
PROT SERPL-MCNC: 7.2 G/DL (ref 6–8.2)
RBC # BLD AUTO: 4.68 M/UL (ref 4.2–5.4)
SODIUM SERPL-SCNC: 141 MMOL/L (ref 135–145)
TROPONIN T SERPL-MCNC: 37 NG/L (ref 6–19)
TROPONIN T SERPL-MCNC: 38 NG/L (ref 6–19)
WBC # BLD AUTO: 12.2 K/UL (ref 4.8–10.8)

## 2023-08-27 PROCEDURE — 93298 REM INTERROG DEV EVAL SCRMS: CPT | Performed by: INTERNAL MEDICINE

## 2023-08-27 PROCEDURE — 93005 ELECTROCARDIOGRAM TRACING: CPT | Performed by: EMERGENCY MEDICINE

## 2023-08-27 PROCEDURE — 700102 HCHG RX REV CODE 250 W/ 637 OVERRIDE(OP): Mod: UD | Performed by: EMERGENCY MEDICINE

## 2023-08-27 PROCEDURE — 93005 ELECTROCARDIOGRAM TRACING: CPT

## 2023-08-27 PROCEDURE — 99285 EMERGENCY DEPT VISIT HI MDM: CPT

## 2023-08-27 PROCEDURE — 84484 ASSAY OF TROPONIN QUANT: CPT

## 2023-08-27 PROCEDURE — 85025 COMPLETE CBC W/AUTO DIFF WBC: CPT

## 2023-08-27 PROCEDURE — 71045 X-RAY EXAM CHEST 1 VIEW: CPT

## 2023-08-27 PROCEDURE — 36415 COLL VENOUS BLD VENIPUNCTURE: CPT

## 2023-08-27 PROCEDURE — A9270 NON-COVERED ITEM OR SERVICE: HCPCS | Mod: UD | Performed by: EMERGENCY MEDICINE

## 2023-08-27 PROCEDURE — 80053 COMPREHEN METABOLIC PANEL: CPT

## 2023-08-27 RX ORDER — AZITHROMYCIN 250 MG/1
TABLET, FILM COATED ORAL
Qty: 6 TABLET | Refills: 0 | Status: ACTIVE | OUTPATIENT
Start: 2023-08-27 | End: 2023-09-01

## 2023-08-27 RX ORDER — AZITHROMYCIN 250 MG/1
500 TABLET, FILM COATED ORAL DAILY
Status: COMPLETED | OUTPATIENT
Start: 2023-08-27 | End: 2023-08-27

## 2023-08-27 RX ADMIN — AZITHROMYCIN DIHYDRATE 500 MG: 250 TABLET ORAL at 23:08

## 2023-08-27 ASSESSMENT — HEART SCORE
TROPONIN: LESS THAN OR EQUAL TO NORMAL LIMIT
AGE: 45-64
ECG: NORMAL
HEART SCORE: 2
RISK FACTORS: 1-2 RISK FACTORS
HISTORY: SLIGHTLY SUSPICIOUS

## 2023-08-27 ASSESSMENT — FIBROSIS 4 INDEX: FIB4 SCORE: 0.84

## 2023-08-27 ASSESSMENT — PAIN DESCRIPTION - PAIN TYPE: TYPE: ACUTE PAIN

## 2023-08-28 ENCOUNTER — TELEPHONE (OUTPATIENT)
Dept: HEALTH INFORMATION MANAGEMENT | Facility: OTHER | Age: 48
End: 2023-08-28
Payer: MEDICAID

## 2023-08-28 NOTE — CARDIAC REMOTE MONITOR - SCAN
Device transmission reviewed. Device demonstrated appropriate function.       Electronically Signed by: Tyrell Solomon M.D.    9/6/2023  1:20 PM

## 2023-08-28 NOTE — ED TRIAGE NOTES
"Chief Complaint   Patient presents with    Chest Pain     BIBA for chest pain x 2 days. Also c/o shortness of breath. Denies nausea, vomiting, arm pain, jaw pain. 324 ASA given by EMS.      BP (!) 143/69   Pulse 64   Temp 36.7 °C (98 °F) (Temporal)   Resp 18   Ht 1.549 m (5' 1\")   Wt 124 kg (274 lb 7.6 oz)   LMP  (LMP Unknown)   SpO2 95%   BMI 51.86 kg/m²     Pt calm, appears comfortable. Chest pain protocol initiated. Pt placed in chair for lab draw, educated on triage process, and told to notify staff of any change in status.    "

## 2023-08-28 NOTE — DISCHARGE INSTRUCTIONS
Continue your current medication.  Return for increased work of breathing.  Recheck with your primary care doctor in 3 to 5 days.

## 2023-08-28 NOTE — ED NOTES
.Patient discharged per order. Oral and written discharge instructions reviewed. Medications sent to home pharmacy. New medications reviewed. First dose given. All belongings accounted for and taken with patient. Questions answered, and patient agrees with discharge plan. Encouraged to follow up with PCP. Patient called a ride from her group home. Ambulated to lobby with walker.

## 2023-08-28 NOTE — ED PROVIDER NOTES
ED Provider Note    CHIEF COMPLAINT  Chief Complaint   Patient presents with    Chest Pain     BIBA for chest pain x 2 days. Also c/o shortness of breath. Denies nausea, vomiting, arm pain, jaw pain. 324 ASA given by EMS.        EXTERNAL RECORDS REVIEWED  Other reviewed the patient's cardiac stress test from  that was negative    HPI/ROS    Lorene Haro is a 47 y.o. female who presents with chest pain.  The patient states she been having intermittent chest pain since yesterday.  She states the pain is sharp and in the substernal region.  There is no radicular component.  She states she had a little bit of a cough as well as some shortness of breath.  She has not had any nausea nor diaphoresis.  On my history she denies radicular discomfort.  According EMS she is complained of some arm pain and jaw pain.  She did receive 324 mg of aspirin per EMS.  She states that she does feel better at this time.  She does not have any pain or swelling to her lower extremities and does not have any risk factors from a DVT standpoint.    PAST MEDICAL HISTORY   has a past medical history of A-fib ([facility]), MONI (acute kidney injury) ([facility]) (2023), Asthma, Bipolar 2 disorder ([facility]), Chickenpox, Chronic obstructive pulmonary disease (HCC), Hypertension, On home oxygen therapy, Other psychological stress, Schizophrenic disorder ([facility]), and Yeast infection of the skin (2021).    SURGICAL HISTORY   has a past surgical history that includes hysterectomy laparoscopy; colectomy; cholecystectomy; and knee arthroscopy (Left).    FAMILY HISTORY  Family History   Problem Relation Age of Onset    No Known Problems Mother     Cancer Sister        SOCIAL HISTORY  Social History     Tobacco Use    Smoking status: Former     Current packs/day: 0.00     Types: Cigarettes     Quit date: 10/12/2022     Years since quittin.8    Smokeless tobacco: Never   Vaping Use    Vaping Use: Never used   Substance and Sexual Activity     "Alcohol use: Not Currently    Drug use: Never    Sexual activity: Not Currently       CURRENT MEDICATIONS  Home Medications       Reviewed by Chapis Sneed R.N. (Registered Nurse) on 08/27/23 at 1946  Med List Status: Partial     Medication Last Dose Status   ADVAIR DISKUS 100-50 MCG/ACT AEROSOL POWDER, BREATH ACTIVATED  Active   albuterol 108 (90 Base) MCG/ACT Aero Soln inhalation aerosol  Active   aripiprazole (ABILIFY) 30 MG tablet  Active   aspirin 81 MG EC tablet  Active   atorvastatin (LIPITOR) 20 MG Tab  Active   famotidine (PEPCID) 20 MG Tab  Active   fluticasone (FLONASE) 50 MCG/ACT nasal spray  Active   furosemide (LASIX) 20 MG Tab  Active   methocarbamol (ROBAXIN) 750 MG Tab  Active   metoprolol SR (TOPROL XL) 25 MG TABLET SR 24 HR  Active   montelukast (SINGULAIR) 10 MG Tab  Active                    ALLERGIES  Allergies   Allergen Reactions    Penicillin G Rash and Itching     pt reports that she gets a rash all over her body and gets itchy    Amoxicillin Rash and Itching     Tolerates cephalosporins, pt reports that she gets a rash all over her body and gets itchy       PHYSICAL EXAM  VITAL SIGNS: BP (!) 143/69   Pulse 64   Temp 36.7 °C (98 °F) (Temporal)   Resp 18   Ht 1.549 m (5' 1\")   Wt 124 kg (274 lb 7.6 oz)   LMP  (LMP Unknown)   SpO2 95%   BMI 51.86 kg/m²    In general the patient does not appear toxic    HEENT unremarkable    Pulmonary the patient's lungs are clear to auscultation bilaterally    Cardiovascular S1-S2 with a regular rate and rhythm    GI abdomen soft    Skin no rashes    Extremities no edema    Neurologic examination is grossly intact    DIAGNOSTIC STUDIES  Results for orders placed or performed during the hospital encounter of 08/27/23   CBC with Differential   Result Value Ref Range    WBC 12.2 (H) 4.8 - 10.8 K/uL    RBC 4.68 4.20 - 5.40 M/uL    Hemoglobin 12.3 12.0 - 16.0 g/dL    Hematocrit 40.2 37.0 - 47.0 %    MCV 85.9 81.4 - 97.8 fL    MCH 26.3 (L) 27.0 - 33.0 pg "    MCHC 30.6 (L) 32.2 - 35.5 g/dL    RDW 46.8 35.9 - 50.0 fL    Platelet Count 265 164 - 446 K/uL    MPV 10.9 9.0 - 12.9 fL    Neutrophils-Polys 76.80 (H) 44.00 - 72.00 %    Lymphocytes 16.60 (L) 22.00 - 41.00 %    Monocytes 5.30 0.00 - 13.40 %    Eosinophils 0.90 0.00 - 6.90 %    Basophils 0.10 0.00 - 1.80 %    Immature Granulocytes 0.30 0.00 - 0.90 %    Nucleated RBC 0.00 0.00 - 0.20 /100 WBC    Neutrophils (Absolute) 9.35 (H) 1.82 - 7.42 K/uL    Lymphs (Absolute) 2.02 1.00 - 4.80 K/uL    Monos (Absolute) 0.64 0.00 - 0.85 K/uL    Eos (Absolute) 0.11 0.00 - 0.51 K/uL    Baso (Absolute) 0.01 0.00 - 0.12 K/uL    Immature Granulocytes (abs) 0.04 0.00 - 0.11 K/uL    NRBC (Absolute) 0.00 K/uL   Complete Metabolic Panel (CMP)   Result Value Ref Range    Sodium 141 135 - 145 mmol/L    Potassium 4.0 3.6 - 5.5 mmol/L    Chloride 104 96 - 112 mmol/L    Co2 27 20 - 33 mmol/L    Anion Gap 10.0 7.0 - 16.0    Glucose 117 (H) 65 - 99 mg/dL    Bun 19 8 - 22 mg/dL    Creatinine 1.09 0.50 - 1.40 mg/dL    Calcium 9.3 8.5 - 10.5 mg/dL    Correct Calcium 9.1 8.5 - 10.5 mg/dL    AST(SGOT) 13 12 - 45 U/L    ALT(SGPT) 15 2 - 50 U/L    Alkaline Phosphatase 135 (H) 30 - 99 U/L    Total Bilirubin 0.7 0.1 - 1.5 mg/dL    Albumin 4.2 3.2 - 4.9 g/dL    Total Protein 7.2 6.0 - 8.2 g/dL    Globulin 3.0 1.9 - 3.5 g/dL    A-G Ratio 1.4 g/dL   Troponins NOW   Result Value Ref Range    Troponin T 37 (H) 6 - 19 ng/L   Troponins in two (2) hours   Result Value Ref Range    Troponin T 38 (H) 6 - 19 ng/L   ESTIMATED GFR   Result Value Ref Range    GFR (CKD-EPI) 62 >60 mL/min/1.73 m 2   EKG (Now)   Result Value Ref Range    Report       [facility] Emergency Dept.    Test Date:  2023  Pt Name:    ADELINA MILLAN                Department: ER  MRN:        [id number]                      Room:       [facility]  Gender:     Female                       Technician: [id number]  :        1975                   Requested By:ER TRIAGE  PROTOCOL  Order #:    [id number]                    Reading MD: SURJIT GLOVER MD    Measurements  Intervals                                Axis  Rate:       74                           P:          48  AK:         135                          QRS:        48  QRSD:       105                          T:          8  QT:         386  QTc:        429    Interpretive Statements  Twelve-lead EKG shows a normal sinus rhythm with a ventricular to 74, normal  QRS, normal intervals, no ST segment elevation or depression, normal T waves.  Electronically Signed On 2023 21:09:33 PDT by SURJIT GLOVER MD     EKG   Result Value Ref Range    Report       [facility] Emergency Dept.    Test Date:  2023  Pt Name:    ADELINA MILLAN                Department: ER  MRN:        [id number]                      Room:       AK 72  Gender:     Female                       Technician: [id number]  :        1975                   Requested By:SURJIT GLOVER  Order #:    [id number]                    Reading MD: SURJIT GLOVER MD    Measurements  Intervals                                Axis  Rate:       66                           P:          55  AK:         144                          QRS:        52  QRSD:       106                          T:          10  QT:         396  QTc:        415    Interpretive Statements  Twelve-lead EKG shows a normal sinus rhythm with a ventricular to 66.  Normal  QRS, normal intervals, no ST segment elevation or depression, overall no  dynamic change from prior  Electronically Signed On 2023 22:12:18 PDT by SURJIT GLOVER MD       EKG  I have independently interpreted this EKG  Please see my interpretation above    RADIOLOGY  I have independently interpreted the diagnostic imaging associated with this visit and am waiting the final reading from the radiologist.   My preliminary interpretation is as follows: Chest x-ray is reviewed and there does appear to be  a left lower lobe infiltrate but I do not see any real significant change from prior  Radiologist interpretation:   DX-CHEST-PORTABLE (1 VIEW)   Final Result         1.  Left lower lobe infiltrate   2.  Cardiomegaly   3.  Atherosclerosis        HEART Score: 2      COURSE & MEDICAL DECISION MAKING    This a 47-year-old female who presents the emerged part with chest discomfort.  I did review a stress test recently that showed no ischemic change.  The patient has a heart score of 2 and is therefore very low risk.  I did perform an EKG on arrival and repeated the EKG as well as troponins which are nondynamic.  Her troponin at her baseline is always slightly elevated.  Chest x-ray does show left lower lobe infiltrate.  This was read as acute per radiology.  Therefore treat the patient for possible pneumonia which would clinically fit her presentation as she also has had a cough and some shortness of breath.  The patient will receive her first dose of azithromycin prior to discharge.  She will be discharged home on a Z-Romario.  I like the patient rechecked by her primary care provider in 3 to 5 days and the patient will return if she is acutely worse.  FINAL DIAGNOSIS  1.  Chest pain  2.  Left lower lobe pneumonia    Disposition  The patient will be discharged in stable condition       Electronically signed by: Carlos Adams M.D., 8/27/2023 9:06 PM

## 2023-09-03 ENCOUNTER — APPOINTMENT (OUTPATIENT)
Dept: RADIOLOGY | Facility: MEDICAL CENTER | Age: 48
End: 2023-09-03
Attending: EMERGENCY MEDICINE
Payer: MEDICAID

## 2023-09-03 ENCOUNTER — HOSPITAL ENCOUNTER (EMERGENCY)
Facility: MEDICAL CENTER | Age: 48
End: 2023-09-03
Attending: EMERGENCY MEDICINE
Payer: MEDICAID

## 2023-09-03 VITALS
BODY MASS INDEX: 51.07 KG/M2 | DIASTOLIC BLOOD PRESSURE: 55 MMHG | TEMPERATURE: 98.2 F | OXYGEN SATURATION: 94 % | HEIGHT: 61 IN | WEIGHT: 270.5 LBS | HEART RATE: 62 BPM | SYSTOLIC BLOOD PRESSURE: 93 MMHG | RESPIRATION RATE: 17 BRPM

## 2023-09-03 DIAGNOSIS — R91.8 PULMONARY INFILTRATE IN LEFT LUNG ON CXR: ICD-10-CM

## 2023-09-03 DIAGNOSIS — R05.1 ACUTE COUGH: ICD-10-CM

## 2023-09-03 LAB
FLUAV RNA SPEC QL NAA+PROBE: NEGATIVE
FLUBV RNA SPEC QL NAA+PROBE: NEGATIVE
RSV RNA SPEC QL NAA+PROBE: NEGATIVE
SARS-COV-2 RNA RESP QL NAA+PROBE: NOTDETECTED
SPECIMEN SOURCE: NORMAL

## 2023-09-03 PROCEDURE — 99284 EMERGENCY DEPT VISIT MOD MDM: CPT

## 2023-09-03 PROCEDURE — 0241U HCHG SARS-COV-2 COVID-19 NFCT DS RESP RNA 4 TRGT MIC: CPT

## 2023-09-03 PROCEDURE — 71046 X-RAY EXAM CHEST 2 VIEWS: CPT

## 2023-09-03 PROCEDURE — 700102 HCHG RX REV CODE 250 W/ 637 OVERRIDE(OP): Mod: UD | Performed by: EMERGENCY MEDICINE

## 2023-09-03 PROCEDURE — A9270 NON-COVERED ITEM OR SERVICE: HCPCS | Mod: UD | Performed by: EMERGENCY MEDICINE

## 2023-09-03 PROCEDURE — C9803 HOPD COVID-19 SPEC COLLECT: HCPCS | Performed by: EMERGENCY MEDICINE

## 2023-09-03 RX ORDER — DOXYCYCLINE 100 MG/1
100 CAPSULE ORAL 2 TIMES DAILY
Qty: 10 CAPSULE | Refills: 0 | Status: ACTIVE | OUTPATIENT
Start: 2023-09-03 | End: 2023-09-08

## 2023-09-03 RX ORDER — DOXYCYCLINE 100 MG/1
100 TABLET ORAL ONCE
Status: COMPLETED | OUTPATIENT
Start: 2023-09-03 | End: 2023-09-03

## 2023-09-03 RX ADMIN — DOXYCYCLINE 100 MG: 100 TABLET, FILM COATED ORAL at 21:17

## 2023-09-03 ASSESSMENT — PAIN DESCRIPTION - PAIN TYPE: TYPE: ACUTE PAIN

## 2023-09-03 ASSESSMENT — FIBROSIS 4 INDEX: FIB4 SCORE: 0.63

## 2023-09-04 NOTE — ED NOTES
Pt discharged . GCS 15. Pt in possession of belongings. Pt provided discharge education and information pertaining to medications and follow up appointments. Pt received copy of discharge instructions and verbalized understanding.     Vitals:    09/03/23 2102   BP: 93/55   Pulse: 62   Resp: 17   Temp: 36.8 °C (98.2 °F)   SpO2: 94%

## 2023-09-04 NOTE — ED NOTES
[facility].  On monitor, call light in reach. Chart up for ERP.

## 2023-09-04 NOTE — ED PROVIDER NOTES
"                                                        ED Provider Note    CHIEF COMPLAINT  Chief Complaint   Patient presents with    Flu Like Symptoms     PT reports flu like symptoms since this AM, PT reports several other family member also feeling unwell. PT reports productive cough, sweats/chills, body aches.         HPI    Primary care provider: Heriberto Sharp M.D.   History obtained from: Patient  History limited by: None     Lorene Haro is a 48 y.o. female who presents to the ED by EMS complaining of flulike symptoms since yesterday with fever and chills, nasal congestion and slightly productive cough and feeling slightly short of breath along with diffuse body aches.  She reports her sister and other family members have been sick also.  She has had slight nausea without vomiting.  She reports 3 episodes of diarrhea today without gross blood noted.  She denies dysuria.  No rash or edema noted.  No recent travels.  Patient denies possibility of pregnancy with history of hysterectomy.  Patient reports \"I want to make sure I am not sick or have COVID.\"    REVIEW OF SYSTEMS  Please see HPI for pertinent positives/negatives.  All other systems reviewed and are negative.     PAST MEDICAL HISTORY  Past Medical History:   Diagnosis Date    MONI (acute kidney injury) (HCC) 2023    Yeast infection of the skin 2021    A-fib (HCC)     Asthma     Bipolar 2 disorder (HCC)     Chickenpox     Chronic obstructive pulmonary disease (HCC)     Hypertension     On home oxygen therapy     Other psychological stress     Schizophrenic disorder (HCC)         SURGICAL HISTORY  Past Surgical History:   Procedure Laterality Date    CHOLECYSTECTOMY      COLECTOMY      HYSTERECTOMY LAPAROSCOPY      KNEE ARTHROSCOPY Left         SOCIAL HISTORY  Social History     Tobacco Use    Smoking status: Former     Current packs/day: 0.00     Types: Cigarettes     Quit date: 10/12/2022     Years since quittin.8    Smokeless " "tobacco: Never   Vaping Use    Vaping Use: Never used   Substance and Sexual Activity    Alcohol use: Not Currently    Drug use: Never    Sexual activity: Not Currently        FAMILY HISTORY  Family History   Problem Relation Age of Onset    No Known Problems Mother     Cancer Sister         CURRENT MEDICATIONS  Home Medications       Reviewed by Aisha Womack R.N. (Registered Nurse) on 09/03/23 at [email or web address]  Med List Status: Partial     Medication Last Dose Status   ADVAIR DISKUS 100-50 MCG/ACT AEROSOL POWDER, BREATH ACTIVATED  Active   albuterol 108 (90 Base) MCG/ACT Aero Soln inhalation aerosol  Active   aripiprazole (ABILIFY) 30 MG tablet  Active   aspirin 81 MG EC tablet  Active   atorvastatin (LIPITOR) 20 MG Tab  Active   famotidine (PEPCID) 20 MG Tab  Active   fluticasone (FLONASE) 50 MCG/ACT nasal spray  Active   furosemide (LASIX) 20 MG Tab  Active   methocarbamol (ROBAXIN) 750 MG Tab  Active   metoprolol SR (TOPROL XL) 25 MG TABLET SR 24 HR  Active   montelukast (SINGULAIR) 10 MG Tab  Active                     ALLERGIES  Allergies   Allergen Reactions    Penicillin G Rash and Itching     pt reports that she gets a rash all over her body and gets itchy    Amoxicillin Rash and Itching     Tolerates cephalosporins, pt reports that she gets a rash all over her body and gets itchy        PHYSICAL EXAM  VITAL SIGNS: BP 93/55   Pulse 62   Temp 36.8 °C (98.2 °F) (Temporal)   Resp 17   Ht 1.549 m (5' 1\")   Wt 123 kg (270 lb 8.1 oz)   LMP  (LMP Unknown)   SpO2 94%   BMI 51.11 kg/m²  @ALISSON[[id number]::@     Pulse ox interpretation: 95% I interpret this pulse ox as normal     Constitutional: Well developed, well nourished, alert in no apparent distress, nontoxic appearance    HENT: No external signs of trauma, normocephalic  Eyes: PERRL, conjunctiva without erythema, no discharge, no icterus    Neck: Soft and supple, trachea midline, no stridor, no tenderness, no LAD, good ROM    Cardiovascular: Regular rate and " rhythm, no murmurs/rubs/gallops, strong distal pulses and good perfusion    Thorax & Lungs: No respiratory distress, CTAB    Abdomen: Soft, nontender, nondistended, no guarding, no rebound, normal BS    Back: No CVAT     Extremities: No cyanosis, no edema, no gross deformity, good ROM, intact distal pulses with brisk cap refill    Skin: Warm, dry, no pallor/cyanosis, no rash noted      Neuro: A/O times 3, no focal deficits noted    Psychiatric: Cooperative, normal mood and affect      DIAGNOSTIC STUDIES / PROCEDURES        LABS  All labs reviewed by me.     Results for orders placed or performed during the hospital encounter of 09/03/23   CoV-2, Flu A/B, And RSV by PCR ([facility])    Specimen: Nasopharyngeal; Respirate   Result Value Ref Range    Influenza virus A RNA Negative Negative    Influenza virus B, PCR Negative Negative    RSV, PCR Negative Negative    SARS-CoV-2 by PCR NotDetected     SARS-CoV-2 Source NP Swab         RADIOLOGY  I have independently interpreted the diagnostic imaging associated with this visit and am waiting the final reading from the radiologist.   My preliminary interpretation is as follows: Perhaps mild left lower infiltrate compared to previous chest x-ray.    DX-CHEST-2 VIEWS   Final Result         Patchy left basilar opacity, atelectasis or infection.             COURSE & MEDICAL DECISION MAKING  Nursing notes, VS, PMSFHx reviewed in chart.     Review of past medical records shows the patient was last seen in this ED August 27, 2023 for chest pain.  Patient was seen in the office by cardiology on August 27, 2023 for device check.  Patient was also seen in the office on August 11, 2023 by cardiology regarding paroxysmal A-fib and other chronic medical issues.      Differential diagnoses considered include but are not limited to: URI, pneumonia, bronchitis, influenza, COVID-19, viral syndrome, allergic rhinitis       ED Observation Status? No; Patient does not meet criteria for ED  Observation.       Discussion of management with other hospitals or appropriate source(s): None     Escalation of care considered, and ultimately not performed: blood analysis and acute inpatient care management, however at this time, the patient is most appropriate for outpatient management.     Decision tools and prescription drugs considered including, but not limited to: Antibiotics   .        History and physical exam as above.  Influenza/RSV/COVID-19 testing returned negative.  Chest x-ray with perhaps mild left lower infiltrate.  I discussed the findings with the patient.  Patient is alert and pleasant in no acute distress and nontoxic in appearance and has been clinically stable during her ED stay.  No hypoxia or respiratory distress.  Although this is likely viral in etiology, given patient's chest x-ray findings and comorbid conditions, will start doxycycline.  She was advised on outpatient follow-up and given return to ED precautions.  Patient verbalized understanding and agreed with plan of care with no further questions or concerns.      The patient is referred to a primary physician for blood pressure management, diabetic screening, and for all other preventative health concerns.       FINAL IMPRESSION  1. Acute cough Acute   2. Pulmonary infiltrate in left lung on CXR Active          DISPOSITION  Patient will be discharged home in stable condition.       FOLLOW UP  Heriberto Sharp M.D.  [address] [place] [id number]  [phone]    Call in 2 days      [facility], Emergency Dept  [address] [facility] [address] [phone]    If symptoms worsen         OUTPATIENT MEDICATIONS  Discharge Medication List as of 9/3/2023  9:19 PM        START taking these medications    Details   doxycycline (MONODOX) 100 MG capsule Take 1 Capsule by mouth 2 times a day for 5 days., Disp-10 Capsule, R-0, Normal                Electronically signed by: Franck Gallardo D.O., 9/3/2023 8:03  PM      Portions of this record were made with voice recognition software.  Despite my review, errors may remain.  Please interpret this chart in the appropriate context.

## 2023-09-16 ENCOUNTER — HOSPITAL ENCOUNTER (EMERGENCY)
Facility: MEDICAL CENTER | Age: 48
End: 2023-09-17
Attending: STUDENT IN AN ORGANIZED HEALTH CARE EDUCATION/TRAINING PROGRAM
Payer: MEDICAID

## 2023-09-16 ENCOUNTER — APPOINTMENT (OUTPATIENT)
Dept: RADIOLOGY | Facility: MEDICAL CENTER | Age: 48
End: 2023-09-16
Attending: STUDENT IN AN ORGANIZED HEALTH CARE EDUCATION/TRAINING PROGRAM
Payer: MEDICAID

## 2023-09-16 VITALS
HEIGHT: 61 IN | WEIGHT: 270 LBS | SYSTOLIC BLOOD PRESSURE: 104 MMHG | TEMPERATURE: 97.7 F | DIASTOLIC BLOOD PRESSURE: 55 MMHG | OXYGEN SATURATION: 93 % | RESPIRATION RATE: 15 BRPM | HEART RATE: 58 BPM | BODY MASS INDEX: 50.98 KG/M2

## 2023-09-16 DIAGNOSIS — R00.2 PALPITATIONS: ICD-10-CM

## 2023-09-16 DIAGNOSIS — R07.9 CHEST PAIN, UNSPECIFIED TYPE: ICD-10-CM

## 2023-09-16 LAB
ALBUMIN SERPL BCP-MCNC: 3.8 G/DL (ref 3.2–4.9)
ALBUMIN/GLOB SERPL: 1.4 G/DL
ALP SERPL-CCNC: 126 U/L (ref 30–99)
ALT SERPL-CCNC: 15 U/L (ref 2–50)
ANION GAP SERPL CALC-SCNC: 11 MMOL/L (ref 7–16)
AST SERPL-CCNC: 16 U/L (ref 12–45)
BASOPHILS # BLD AUTO: 0.1 % (ref 0–1.8)
BASOPHILS # BLD: 0.01 K/UL (ref 0–0.12)
BILIRUB SERPL-MCNC: 0.4 MG/DL (ref 0.1–1.5)
BUN SERPL-MCNC: 19 MG/DL (ref 8–22)
CALCIUM ALBUM COR SERPL-MCNC: 9.3 MG/DL (ref 8.5–10.5)
CALCIUM SERPL-MCNC: 9.1 MG/DL (ref 8.5–10.5)
CHLORIDE SERPL-SCNC: 106 MMOL/L (ref 96–112)
CO2 SERPL-SCNC: 25 MMOL/L (ref 20–33)
CREAT SERPL-MCNC: 1.22 MG/DL (ref 0.5–1.4)
EKG IMPRESSION: NORMAL
EOSINOPHIL # BLD AUTO: 0.13 K/UL (ref 0–0.51)
EOSINOPHIL NFR BLD: 1.5 % (ref 0–6.9)
ERYTHROCYTE [DISTWIDTH] IN BLOOD BY AUTOMATED COUNT: 47.1 FL (ref 35.9–50)
GFR SERPLBLD CREATININE-BSD FMLA CKD-EPI: 54 ML/MIN/1.73 M 2
GLOBULIN SER CALC-MCNC: 2.7 G/DL (ref 1.9–3.5)
GLUCOSE SERPL-MCNC: 105 MG/DL (ref 65–99)
HCT VFR BLD AUTO: 35.9 % (ref 37–47)
HGB BLD-MCNC: 11.1 G/DL (ref 12–16)
IMM GRANULOCYTES # BLD AUTO: 0.03 K/UL (ref 0–0.11)
IMM GRANULOCYTES NFR BLD AUTO: 0.4 % (ref 0–0.9)
LYMPHOCYTES # BLD AUTO: 1.83 K/UL (ref 1–4.8)
LYMPHOCYTES NFR BLD: 21.4 % (ref 22–41)
MCH RBC QN AUTO: 26.4 PG (ref 27–33)
MCHC RBC AUTO-ENTMCNC: 30.9 G/DL (ref 32.2–35.5)
MCV RBC AUTO: 85.5 FL (ref 81.4–97.8)
MONOCYTES # BLD AUTO: 0.53 K/UL (ref 0–0.85)
MONOCYTES NFR BLD AUTO: 6.2 % (ref 0–13.4)
NEUTROPHILS # BLD AUTO: 6.01 K/UL (ref 1.82–7.42)
NEUTROPHILS NFR BLD: 70.4 % (ref 44–72)
NRBC # BLD AUTO: 0 K/UL
NRBC BLD-RTO: 0 /100 WBC (ref 0–0.2)
PLATELET # BLD AUTO: 213 K/UL (ref 164–446)
PMV BLD AUTO: 11.1 FL (ref 9–12.9)
POTASSIUM SERPL-SCNC: 3.9 MMOL/L (ref 3.6–5.5)
PROT SERPL-MCNC: 6.5 G/DL (ref 6–8.2)
RBC # BLD AUTO: 4.2 M/UL (ref 4.2–5.4)
SODIUM SERPL-SCNC: 142 MMOL/L (ref 135–145)
TROPONIN T SERPL-MCNC: 39 NG/L (ref 6–19)
TROPONIN T SERPL-MCNC: 44 NG/L (ref 6–19)
WBC # BLD AUTO: 8.5 K/UL (ref 4.8–10.8)

## 2023-09-16 PROCEDURE — 84484 ASSAY OF TROPONIN QUANT: CPT

## 2023-09-16 PROCEDURE — 36415 COLL VENOUS BLD VENIPUNCTURE: CPT

## 2023-09-16 PROCEDURE — 700111 HCHG RX REV CODE 636 W/ 250 OVERRIDE (IP): Mod: UD | Performed by: STUDENT IN AN ORGANIZED HEALTH CARE EDUCATION/TRAINING PROGRAM

## 2023-09-16 PROCEDURE — 96374 THER/PROPH/DIAG INJ IV PUSH: CPT

## 2023-09-16 PROCEDURE — 85025 COMPLETE CBC W/AUTO DIFF WBC: CPT

## 2023-09-16 PROCEDURE — 99285 EMERGENCY DEPT VISIT HI MDM: CPT

## 2023-09-16 PROCEDURE — 93005 ELECTROCARDIOGRAM TRACING: CPT | Performed by: STUDENT IN AN ORGANIZED HEALTH CARE EDUCATION/TRAINING PROGRAM

## 2023-09-16 PROCEDURE — 71045 X-RAY EXAM CHEST 1 VIEW: CPT

## 2023-09-16 PROCEDURE — 80053 COMPREHEN METABOLIC PANEL: CPT

## 2023-09-16 RX ORDER — MORPHINE SULFATE 4 MG/ML
4 INJECTION INTRAVENOUS ONCE
Status: COMPLETED | OUTPATIENT
Start: 2023-09-16 | End: 2023-09-16

## 2023-09-16 RX ADMIN — MORPHINE SULFATE 4 MG: 4 INJECTION INTRAVENOUS at 22:53

## 2023-09-16 ASSESSMENT — PAIN DESCRIPTION - PAIN TYPE
TYPE: ACUTE PAIN

## 2023-09-16 ASSESSMENT — FIBROSIS 4 INDEX: FIB4 SCORE: 0.63

## 2023-09-17 LAB — EKG IMPRESSION: NORMAL

## 2023-09-17 ASSESSMENT — HEART SCORE
ECG: NORMAL
HISTORY: SLIGHTLY SUSPICIOUS
HEART SCORE: 3
AGE: 45-64
RISK FACTORS: 1-2 RISK FACTORS
TROPONIN: 1-3 TIMES NORMAL LIMIT

## 2023-09-17 NOTE — ED PROVIDER NOTES
CHIEF COMPLAINT  Chief Complaint   Patient presents with    Palpitations    Shortness of Breath       LIMITATION TO HISTORY   Select: None    HPI    Lorene Haro is a 48 y.o. female who presents to the Emergency Department evaluation of chest pain and palpitations.  Patient states that for the past day she has been having intermittent palpitations states that she will occasionally feel a heartbeat in her chest with some associated discomfort and shortness of breath when this occurs.  Denies any feelings of a racing heart, dizziness lightheadedness or syncope associated with these palpitations.  Denies any current ongoing chest pain, shortness of breath.    OUTSIDE HISTORIAN(S):  Select: EMS reports patient complaining of palpitations  EXTERNAL RECORDS REVIEWED  Select: Other Stress test 8/9    PERFUSION:   Normal myocardial perfusion with no ischemia.      FUNCTIONAL RESULTS (calculated via Gated SPECT)      Stress Image LV EF:        63     %      Upper Normal Limit      Stress EDV:      115    ml   EDVI:    53      ml/m2      Stress ESV:      43     ml   ESVI:    20      ml/m2      TID:    0.96   TID - 1.19      TID (ed) - 1.23   LV Function:   Normal left ventricular wall motion.  LV ejection fraction = 63%.         PAST MEDICAL HISTORY  Past Medical History:   Diagnosis Date    A-fib ([facility])     MONI (acute kidney injury) ([facility]) 8/9/2023    Asthma     Bipolar 2 disorder ([facility])     Chickenpox     Chronic obstructive pulmonary disease (HCC)     Hypertension     On home oxygen therapy     Other psychological stress     Schizophrenic disorder ([facility])     Yeast infection of the skin 04/01/2021     .    SURGICAL HISTORY  Past Surgical History:   Procedure Laterality Date    CHOLECYSTECTOMY      COLECTOMY      HYSTERECTOMY LAPAROSCOPY      KNEE ARTHROSCOPY Left          FAMILY HISTORY  Family History   Problem Relation Age of Onset    No Known Problems Mother     Cancer Sister           SOCIAL HISTORY  Social History      Socioeconomic History    Marital status: Single     Spouse name: Not on file    Number of children: Not on file    Years of education: Not on file    Highest education level: Not on file   Occupational History    Not on file   Tobacco Use    Smoking status: Former     Current packs/day: 0.00     Types: Cigarettes     Quit date: 10/12/2022     Years since quittin.9    Smokeless tobacco: Never   Vaping Use    Vaping Use: Never used   Substance and Sexual Activity    Alcohol use: Not Currently    Drug use: Never    Sexual activity: Not Currently   Other Topics Concern    Not on file   Social History Narrative    ** Merged History Encounter **         From [place]     Social Determinants of Health     Financial Resource Strain: Medium Risk (2022)    Overall Financial Resource Strain (CARDIA)     Difficulty of Paying Living Expenses: Somewhat hard   Food Insecurity: No Food Insecurity (8/3/2023)    Hunger Vital Sign     Worried About Running Out of Food in the Last Year: Never true     Ran Out of Food in the Last Year: Never true   Transportation Needs: No Transportation Needs (2022)    PRAPARE - Transportation     Lack of Transportation (Medical): No     Lack of Transportation (Non-Medical): No   Physical Activity: Not on file   Stress: Not on file   Social Connections: Not on file   Intimate Partner Violence: Not on file   Housing Stability: Not on file         CURRENT MEDICATIONS  No current facility-administered medications on file prior to encounter.     Current Outpatient Medications on File Prior to Encounter   Medication Sig Dispense Refill    albuterol 108 (90 Base) MCG/ACT Aero Soln inhalation aerosol Inhale 2 Puffs every 6 hours as needed for Shortness of Breath. 8.5 g 0    fluticasone (FLONASE) 50 MCG/ACT nasal spray       ADVAIR DISKUS 100-50 MCG/ACT AEROSOL POWDER, BREATH ACTIVATED       aspirin 81 MG EC tablet Take 81 mg by mouth every day.      metoprolol SR (TOPROL XL) 25 MG TABLET SR 24  "HR Take 25 mg by mouth every day.      methocarbamol (ROBAXIN) 750 MG Tab Take 2 Tablets by mouth 3 times a day. 20 Tablet 0    famotidine (PEPCID) 20 MG Tab Take 1 Tablet by mouth 2 times a day as needed (pain). For upper belly pain 30 Tablet 0    furosemide (LASIX) 20 MG Tab Take 1 Tablet by mouth every day. 90 Tablet 0    aripiprazole (ABILIFY) 30 MG tablet Take 1 Tablet by mouth every morning. Indications: Major Depressive Disorder 30 Tablet 0    atorvastatin (LIPITOR) 20 MG Tab Take 1 Tablet by mouth at bedtime. 30 Tablet 0    montelukast (SINGULAIR) 10 MG Tab Take 1 Tablet by mouth every evening. 30 Tablet 0           ALLERGIES  Allergies   Allergen Reactions    Penicillin G Rash and Itching     pt reports that she gets a rash all over her body and gets itchy    Amoxicillin Rash and Itching     Tolerates cephalosporins, pt reports that she gets a rash all over her body and gets itchy       PHYSICAL EXAM  VITAL SIGNS:/55   Pulse (!) 58   Temp 36.5 °C (97.7 °F) (Temporal)   Resp 15   Ht 1.549 m (5' 1\")   Wt 122 kg (270 lb)   LMP  (LMP Unknown)   SpO2 93%   BMI 51.02 kg/m²       VITALS - vital signs documented prior to this note have been reviewed and noted,  GENERAL - awake, alert, oriented, GCS 15, no apparent distress, non-toxic  appearing  HEENT - normocephalic, atraumatic, pupils equal, sclera anicteric, mucus  membranes moist  NECK - supple, no meningismus, full active range of motion, trachea midline  CARDIOVASCULAR - regular rate/rhythm, no murmurs/gallops/rubs  PULMONARY - no respiratory distress, speaking in full sentences, clear to  auscultation bilaterally, no wheezing/ronchi/rales, no accessory muscle use  GASTROINTESTINAL - soft, non-tender, non-distended, no rebound, guarding,  or peritonitis  GENITOURINARY - Deferred  NEUROLOGIC - Awake alert, normal mental status, speech fluid, cognition  normal, moves all extremities  MUSCULOSKELETAL - no obvious asymmetry or deformities " present  EXTREMITIES - warm, well-perfused, no cyanosis or significant edema  DERMATOLOGIC - warm, dry, no rashes, no jaundice  PSYCHIATRIC - normal affect, normal insight, normal concentration    DIAGNOSTIC STUDIES / PROCEDURES  EKG  I have independently interpreted this EKG  Interpreted below by myself as sinus bradycardia     Report   Date Value Ref Range Status   2023       [facility] Emergency Dept.    Test Date:  2023  Pt Name:    ADELINA MILLAN                Department: ER  MRN:        [id number]                      Room:        09  Gender:     Female                       Technician: [id number]  :        [id number]                   Requested By:FAMILIA TYLER  Order #:    [id number]                    Reading MD: Familia Tyler    Measurements  Intervals                                Axis  Rate:       54                           P:          52  TX:         157                          QRS:        65  QRSD:       92                           T:          20  QT:         412  QTc:        391    Interpretive Statements  Sinus bradycardia  Compared to ECG 2023 20:21:06  Sinus rhythm no longer present  Electronically Signed On 2023 00:50:36 PDT by Familia Tyler                    LABS  Labs Reviewed   CBC WITH DIFFERENTIAL - Abnormal; Notable for the following components:       Result Value    Hemoglobin 11.1 (*)     Hematocrit 35.9 (*)     MCH 26.4 (*)     MCHC 30.9 (*)     Lymphocytes 21.40 (*)     All other components within normal limits   COMP METABOLIC PANEL - Abnormal; Notable for the following components:    Glucose 105 (*)     Alkaline Phosphatase 126 (*)     All other components within normal limits   ESTIMATED GFR - Abnormal; Notable for the following components:    GFR (CKD-EPI) 54 (*)     All other components within normal limits   TROPONIN - Abnormal; Notable for the following components:    Troponin T 44 (*)     All other components within  normal limits   TROPONIN - Abnormal; Notable for the following components:    Troponin T 39 (*)     All other components within normal limits    Narrative:     Biotin intake of greater than 5 mg per day may interfere with                  troponin levels, causing false low values.     Is no evidence of significant anemia or metabolic derangements to explain her palpitations  Troponin is slightly positive though near patient's baseline and delta troponin is not significantly changed lowering concern for ACS  RADIOLOGY  I have independently interpreted the diagnostic imaging associated with this visit and am waiting the final reading from the radiologist.   My preliminary interpretation is as follows: Chest x-ray shows no acute cardiopulmonary process      Radiologist interpretation:   DX-CHEST-PORTABLE (1 VIEW)   Final Result      1.  No acute cardiopulmonary abnormality identified.      2.  Enlarged cardiac silhouette           COURSE & MEDICAL DECISION MAKING    ED COURSE:    ED Observation Status? Yes;I am placing the patient in to an observation status due to a diagnostic uncertainty as well as therapeutic intensity. Patient placed in observation status at 8:24 PM 9/16/2023    Observation plan is as follows:     INTERVENTIONS BY ME:  Medications   morphine 4 MG/ML injection 4 mg (4 mg Intravenous Given 9/16/23 2253)       Reevaluation at 2200 patient has no acute complaints  Reevaluation at 2300 patient is resting comfortably no acute distress informed around pending delta troponin  Reevaluation at 2320 patient resting comfortably no acute distress, delta troponins is unchanged she has been monitored in the emergency department for 4 hours with no evidence of ectopy or arrhythmia on the monitor do believe she is appropriate for outpatient management  Response on recheck: improved.      Patient discharged from ED observation at 2324 9/16/23  .          INITIAL ASSESSMENT, COURSE AND PLAN  Care Narrative: Patient  presented for evaluation of chest pain and palpitations.   twelve-lead did demonstrate a sinus bradycardia though there is no evidence of block or other underlying serious arrhythmia did appear unchanged when compared to her prior EKGs, there were no ischemic changes.  Her troponin was minimally elevated though did appear to be near her baseline she does have a recent stress test last month which was negative and her delta troponin is unchanged heart score is 3 lowest risk for Mace at this point.  She has been observed in the emergency department for 4 hours with no evidence of ectopy or arrhythmia on the monitor I do believe she is appropriate for outpatient management and follow-up in regards to her palpitations and chest pain at this time.  All pertinent return precautions were discussed with the patient, and they expressed understanding.  Patient was discharged in a stable condition                 ADDITIONAL PROBLEM LIST  Palpitations, anemia, bradycardia  DISPOSITION AND DISCUSSIONS      Escalation of care considered, and ultimately not performed:acute inpatient care management, however at this time, the patient is most appropriate for outpatient management    Barriers to care at this time, including but not limited to: Patient lacks transportation .     Decision tools and prescription drugs considered including, but not limited to: HEART Score 3 .    FINAL DIAGNOSIS  1. Palpitations    2. Chest pain, unspecified type             Electronically signed by: Familia Tyler DO ,12:54 AM 09/16/23

## 2023-09-17 NOTE — ED NOTES
Pt is requesting a cab for transport to home. RN will facilitate MTM ride. Pt verbalized understanding.

## 2023-09-17 NOTE — ED NOTES
MTM ride facilitated.  ETA 15 mins    Drop off: [address] [place] [address]  Contact number [phone]

## 2023-09-17 NOTE — ED TRIAGE NOTES
"Chief Complaint   Patient presents with    Palpitations    Shortness of Breath       BIB by home. Pt reports palpitations and chest pressure and sob today. Pt also reports nausea.     Hx: COPD  Pt able to speak in full sentences. 94 % RA      Medications given en route:4mg zofran    Pulse 72   Temp 36.4 °C (97.6 °F) (Temporal)   Ht 1.549 m (5' 1\")   Wt 122 kg (270 lb)   LMP  (LMP Unknown)   SpO2 94%   BMI 51.02 kg/m²    "

## 2023-09-17 NOTE — ED NOTES
Pt discharged to home. Discharge paperwork provided. Education provided by ERP. Reinforced discharge instructions.  Pt was given follow up instructions  Pt verbalized understanding of all instructions for discharge.   Patient went out of the ED lobby awaiting for ride via wheelchair, alert and oriented x 4, with all belongings.

## 2023-09-23 ENCOUNTER — APPOINTMENT (OUTPATIENT)
Dept: RADIOLOGY | Facility: MEDICAL CENTER | Age: 48
End: 2023-09-23
Attending: EMERGENCY MEDICINE
Payer: MEDICAID

## 2023-09-23 ENCOUNTER — HOSPITAL ENCOUNTER (EMERGENCY)
Facility: MEDICAL CENTER | Age: 48
End: 2023-09-23
Attending: EMERGENCY MEDICINE
Payer: MEDICAID

## 2023-09-23 VITALS
DIASTOLIC BLOOD PRESSURE: 56 MMHG | TEMPERATURE: 98 F | HEART RATE: 63 BPM | WEIGHT: 270.06 LBS | SYSTOLIC BLOOD PRESSURE: 112 MMHG | RESPIRATION RATE: 20 BRPM | BODY MASS INDEX: 49.7 KG/M2 | HEIGHT: 62 IN | OXYGEN SATURATION: 97 %

## 2023-09-23 DIAGNOSIS — M79.18 MYOFASCIAL PAIN: ICD-10-CM

## 2023-09-23 DIAGNOSIS — R10.9 FLANK PAIN: ICD-10-CM

## 2023-09-23 LAB
ALBUMIN SERPL BCP-MCNC: 4.4 G/DL (ref 3.2–4.9)
ALBUMIN/GLOB SERPL: 1.4 G/DL
ALP SERPL-CCNC: 140 U/L (ref 30–99)
ALT SERPL-CCNC: 14 U/L (ref 2–50)
ANION GAP SERPL CALC-SCNC: 12 MMOL/L (ref 7–16)
APPEARANCE UR: CLEAR
AST SERPL-CCNC: 15 U/L (ref 12–45)
BASOPHILS # BLD AUTO: 0.2 % (ref 0–1.8)
BASOPHILS # BLD: 0.02 K/UL (ref 0–0.12)
BILIRUB SERPL-MCNC: 0.6 MG/DL (ref 0.1–1.5)
BILIRUB UR QL STRIP.AUTO: NEGATIVE
BUN SERPL-MCNC: 12 MG/DL (ref 8–22)
CALCIUM ALBUM COR SERPL-MCNC: 9.4 MG/DL (ref 8.5–10.5)
CALCIUM SERPL-MCNC: 9.7 MG/DL (ref 8.5–10.5)
CHLORIDE SERPL-SCNC: 105 MMOL/L (ref 96–112)
CO2 SERPL-SCNC: 27 MMOL/L (ref 20–33)
COLOR UR: YELLOW
CREAT SERPL-MCNC: 0.97 MG/DL (ref 0.5–1.4)
EOSINOPHIL # BLD AUTO: 0.13 K/UL (ref 0–0.51)
EOSINOPHIL NFR BLD: 1.3 % (ref 0–6.9)
ERYTHROCYTE [DISTWIDTH] IN BLOOD BY AUTOMATED COUNT: 49.1 FL (ref 35.9–50)
GFR SERPLBLD CREATININE-BSD FMLA CKD-EPI: 71 ML/MIN/1.73 M 2
GLOBULIN SER CALC-MCNC: 3.1 G/DL (ref 1.9–3.5)
GLUCOSE SERPL-MCNC: 107 MG/DL (ref 65–99)
GLUCOSE UR STRIP.AUTO-MCNC: NEGATIVE MG/DL
HCT VFR BLD AUTO: 39.1 % (ref 37–47)
HGB BLD-MCNC: 11.8 G/DL (ref 12–16)
IMM GRANULOCYTES # BLD AUTO: 0.02 K/UL (ref 0–0.11)
IMM GRANULOCYTES NFR BLD AUTO: 0.2 % (ref 0–0.9)
KETONES UR STRIP.AUTO-MCNC: NEGATIVE MG/DL
LEUKOCYTE ESTERASE UR QL STRIP.AUTO: NEGATIVE
LIPASE SERPL-CCNC: 29 U/L (ref 11–82)
LYMPHOCYTES # BLD AUTO: 1.81 K/UL (ref 1–4.8)
LYMPHOCYTES NFR BLD: 18.5 % (ref 22–41)
MCH RBC QN AUTO: 26.2 PG (ref 27–33)
MCHC RBC AUTO-ENTMCNC: 30.2 G/DL (ref 32.2–35.5)
MCV RBC AUTO: 86.7 FL (ref 81.4–97.8)
MICRO URNS: NORMAL
MONOCYTES # BLD AUTO: 0.52 K/UL (ref 0–0.85)
MONOCYTES NFR BLD AUTO: 5.3 % (ref 0–13.4)
NEUTROPHILS # BLD AUTO: 7.28 K/UL (ref 1.82–7.42)
NEUTROPHILS NFR BLD: 74.5 % (ref 44–72)
NITRITE UR QL STRIP.AUTO: NEGATIVE
NRBC # BLD AUTO: 0 K/UL
NRBC BLD-RTO: 0 /100 WBC (ref 0–0.2)
PH UR STRIP.AUTO: 6.5 [PH] (ref 5–8)
PLATELET # BLD AUTO: 252 K/UL (ref 164–446)
PMV BLD AUTO: 11.4 FL (ref 9–12.9)
POTASSIUM SERPL-SCNC: 4.2 MMOL/L (ref 3.6–5.5)
PROT SERPL-MCNC: 7.5 G/DL (ref 6–8.2)
PROT UR QL STRIP: NEGATIVE MG/DL
RBC # BLD AUTO: 4.51 M/UL (ref 4.2–5.4)
RBC UR QL AUTO: NEGATIVE
SODIUM SERPL-SCNC: 144 MMOL/L (ref 135–145)
SP GR UR STRIP.AUTO: 1.01
UROBILINOGEN UR STRIP.AUTO-MCNC: 0.2 MG/DL
WBC # BLD AUTO: 9.8 K/UL (ref 4.8–10.8)

## 2023-09-23 PROCEDURE — 99284 EMERGENCY DEPT VISIT MOD MDM: CPT

## 2023-09-23 PROCEDURE — 36415 COLL VENOUS BLD VENIPUNCTURE: CPT

## 2023-09-23 PROCEDURE — 700111 HCHG RX REV CODE 636 W/ 250 OVERRIDE (IP): Mod: JZ,UD | Performed by: EMERGENCY MEDICINE

## 2023-09-23 PROCEDURE — 74176 CT ABD & PELVIS W/O CONTRAST: CPT

## 2023-09-23 PROCEDURE — 85025 COMPLETE CBC W/AUTO DIFF WBC: CPT

## 2023-09-23 PROCEDURE — 81003 URINALYSIS AUTO W/O SCOPE: CPT

## 2023-09-23 PROCEDURE — 96374 THER/PROPH/DIAG INJ IV PUSH: CPT

## 2023-09-23 PROCEDURE — 80053 COMPREHEN METABOLIC PANEL: CPT

## 2023-09-23 PROCEDURE — 700105 HCHG RX REV CODE 258: Mod: UD | Performed by: EMERGENCY MEDICINE

## 2023-09-23 PROCEDURE — 83690 ASSAY OF LIPASE: CPT

## 2023-09-23 RX ORDER — SODIUM CHLORIDE, SODIUM LACTATE, POTASSIUM CHLORIDE, CALCIUM CHLORIDE 600; 310; 30; 20 MG/100ML; MG/100ML; MG/100ML; MG/100ML
1000 INJECTION, SOLUTION INTRAVENOUS ONCE
Status: COMPLETED | OUTPATIENT
Start: 2023-09-23 | End: 2023-09-23

## 2023-09-23 RX ORDER — KETOROLAC TROMETHAMINE 30 MG/ML
30 INJECTION, SOLUTION INTRAMUSCULAR; INTRAVENOUS ONCE
Status: COMPLETED | OUTPATIENT
Start: 2023-09-23 | End: 2023-09-23

## 2023-09-23 RX ADMIN — KETOROLAC TROMETHAMINE 30 MG: 30 INJECTION, SOLUTION INTRAMUSCULAR; INTRAVENOUS at 14:12

## 2023-09-23 RX ADMIN — SODIUM CHLORIDE, POTASSIUM CHLORIDE, SODIUM LACTATE AND CALCIUM CHLORIDE 1000 ML: 600; 310; 30; 20 INJECTION, SOLUTION INTRAVENOUS at 14:21

## 2023-09-23 ASSESSMENT — FIBROSIS 4 INDEX: FIB4 SCORE: 0.97

## 2023-09-23 NOTE — ED TRIAGE NOTES
"Chief Complaint   Patient presents with    Flank Pain     Pt coming from group home. L flank pain radiating to lower abd started 1 week ago. Denies N/V or dysuria. Denies fever.     BP (!) 145/69   Pulse 63   Temp 36.6 °C (97.9 °F) (Temporal)   Resp 16   Ht 1.575 m (5' 2\")   Wt 123 kg (270 lb 1 oz)   LMP  (LMP Unknown)   SpO2 96%   BMI 49.40 kg/m²     "

## 2023-09-23 NOTE — DISCHARGE INSTRUCTIONS
Over-the-counter Tylenol and Motrin as needed for pain.  Avoid heavy lifting bending and twisting return for fevers or chills or any worsening symptoms.

## 2023-09-23 NOTE — ED PROVIDER NOTES
ED Provider Note    CHIEF COMPLAINT  Chief Complaint   Patient presents with    Flank Pain     Pt coming from group home. L flank pain radiating to lower abd started 1 week ago. Denies N/V or dysuria. Denies fever.       EXTERNAL RECORDS REVIEWED  Outpatient Notes patient was seen by outpatient cardiology 2023 for follow-up for arrhythmia.  She has a history of paroxysmal atrial fibrillation and went to cardiology for clearance for a dental procedure    HPI/ROS  LIMITATION TO HISTORY   Select: : None  OUTSIDE HISTORIAN(S):  none    Lorene Haro is a 48 y.o. female who presents planing of left flank pain rating into her left lower abdomen that started about a week ago.  Nothing makes it better or worse she describes it as an intermittent sharp pain.  She denies any nausea or vomiting dysuria frequency or urgency blood in her urine or diarrhea.  She has never had symptoms like this before.  She denies any falls or trauma.  She denies any leg weakness or numbness.    PAST MEDICAL HISTORY   has a past medical history of A-fib ([facility]), MONI (acute kidney injury) ([facility]) (2023), Asthma, Bipolar 2 disorder ([facility]), Chickenpox, Chronic obstructive pulmonary disease ([facility]), Hypertension, On home oxygen therapy, Other psychological stress, Schizophrenic disorder ([facility]), and Yeast infection of the skin (2021).    SURGICAL HISTORY   has a past surgical history that includes hysterectomy laparoscopy; colectomy; cholecystectomy; and knee arthroscopy (Left).    FAMILY HISTORY  Family History   Problem Relation Age of Onset    No Known Problems Mother     Cancer Sister        SOCIAL HISTORY  Social History     Tobacco Use    Smoking status: Former     Current packs/day: 0.00     Types: Cigarettes     Quit date: 10/12/2022     Years since quittin.9    Smokeless tobacco: Never   Vaping Use    Vaping Use: Never used   Substance and Sexual Activity    Alcohol use: Not Currently    Drug use: Never    Sexual activity: Not  "Currently       CURRENT MEDICATIONS  Home Medications       Reviewed by Yanick Adams R.N. (Registered Nurse) on 09/23/23 at 1256  Med List Status: Partial     Medication Last Dose Status   ADVAIR DISKUS 100-50 MCG/ACT AEROSOL POWDER, BREATH ACTIVATED  Active   albuterol 108 (90 Base) MCG/ACT Aero Soln inhalation aerosol  Active   aripiprazole (ABILIFY) 30 MG tablet  Active   aspirin 81 MG EC tablet  Active   atorvastatin (LIPITOR) 20 MG Tab  Active   famotidine (PEPCID) 20 MG Tab  Active   fluticasone (FLONASE) 50 MCG/ACT nasal spray  Active   furosemide (LASIX) 20 MG Tab  Active   methocarbamol (ROBAXIN) 750 MG Tab  Active   metoprolol SR (TOPROL XL) 25 MG TABLET SR 24 HR  Active   montelukast (SINGULAIR) 10 MG Tab  Active                    ALLERGIES  Allergies   Allergen Reactions    Penicillin G Rash and Itching     pt reports that she gets a rash all over her body and gets itchy    Amoxicillin Rash and Itching     Tolerates cephalosporins, pt reports that she gets a rash all over her body and gets itchy       PHYSICAL EXAM  VITAL SIGNS: BP (!) 145/69   Pulse 63   Temp 36.6 °C (97.9 °F) (Temporal)   Resp 16   Ht 1.575 m (5' 2\")   Wt 123 kg (270 lb 1 oz)   LMP  (LMP Unknown)   SpO2 96%   BMI 49.40 kg/m²      Constitutional: Well developed, Well nourished, comfortable non-toxic appearance.   HEENT: Normocephalic, Atraumatic,  external ears normal, pharynx pink,  Mucous  Membranes dry, No rhinorrhea or mucosal edema  Eyes: PERRL, EOMI, Conjunctiva normal, No discharge.   Neck: Normal range of motion, No tenderness, Supple, No stridor.   Lymphatic: No lymphadenopathy    Cardiovascular: Regular Rate and Rhythm, No murmurs,  rubs, or gallops.   Thorax & Lungs: Lungs clear to auscultation bilaterally, No respiratory distress, No wheezes, rhales or rhonchi, No chest wall tenderness.   Abdomen: Bowel sounds normal, Soft, positive left flank and left lower quadrant tenderness that is mild to palpation, non " distended,  No pulsatile masses., no rebound guarding or peritoneal signs.   Skin: Warm, Dry, No erythema, No rash,   Back:  No CVA tenderness,  No spinal tenderness, bony crepitance step offs or instability.   Extremities:   No tenderness.  Deformity  Musculoskeletal: Good range of motion in all major joints.   Neurologic: Alert & oriented No focal deficits noted.  Psychiatric: Affect normal, Judgment normal, Mood normal.      DIAGNOSTIC STUDIES / PROCEDURES  EKG  I have independently interpreted this EKG  None    LABS  Results for orders placed or performed during the hospital encounter of 09/23/23   CBC with Differential   Result Value Ref Range    WBC 9.8 4.8 - 10.8 K/uL    RBC 4.51 4.20 - 5.40 M/uL    Hemoglobin 11.8 (L) 12.0 - 16.0 g/dL    Hematocrit 39.1 37.0 - 47.0 %    MCV 86.7 81.4 - 97.8 fL    MCH 26.2 (L) 27.0 - 33.0 pg    MCHC 30.2 (L) 32.2 - 35.5 g/dL    RDW 49.1 35.9 - 50.0 fL    Platelet Count 252 164 - 446 K/uL    MPV 11.4 9.0 - 12.9 fL    Neutrophils-Polys 74.50 (H) 44.00 - 72.00 %    Lymphocytes 18.50 (L) 22.00 - 41.00 %    Monocytes 5.30 0.00 - 13.40 %    Eosinophils 1.30 0.00 - 6.90 %    Basophils 0.20 0.00 - 1.80 %    Immature Granulocytes 0.20 0.00 - 0.90 %    Nucleated RBC 0.00 0.00 - 0.20 /100 WBC    Neutrophils (Absolute) 7.28 1.82 - 7.42 K/uL    Lymphs (Absolute) 1.81 1.00 - 4.80 K/uL    Monos (Absolute) 0.52 0.00 - 0.85 K/uL    Eos (Absolute) 0.13 0.00 - 0.51 K/uL    Baso (Absolute) 0.02 0.00 - 0.12 K/uL    Immature Granulocytes (abs) 0.02 0.00 - 0.11 K/uL    NRBC (Absolute) 0.00 K/uL   Complete Metabolic Panel   Result Value Ref Range    Sodium 144 135 - 145 mmol/L    Potassium 4.2 3.6 - 5.5 mmol/L    Chloride 105 96 - 112 mmol/L    Co2 27 20 - 33 mmol/L    Anion Gap 12.0 7.0 - 16.0    Glucose 107 (H) 65 - 99 mg/dL    Bun 12 8 - 22 mg/dL    Creatinine 0.97 0.50 - 1.40 mg/dL    Calcium 9.7 8.5 - 10.5 mg/dL    Correct Calcium 9.4 8.5 - 10.5 mg/dL    AST(SGOT) 15 12 - 45 U/L    ALT(SGPT) 14  2 - 50 U/L    Alkaline Phosphatase 140 (H) 30 - 99 U/L    Total Bilirubin 0.6 0.1 - 1.5 mg/dL    Albumin 4.4 3.2 - 4.9 g/dL    Total Protein 7.5 6.0 - 8.2 g/dL    Globulin 3.1 1.9 - 3.5 g/dL    A-G Ratio 1.4 g/dL   Lipase   Result Value Ref Range    Lipase 29 11 - 82 U/L   Urinalysis    Specimen: Blood   Result Value Ref Range    Color Yellow     Character Clear     Specific Gravity 1.008 <1.035    Ph 6.5 5.0 - 8.0    Glucose Negative Negative mg/dL    Ketones Negative Negative mg/dL    Protein Negative Negative mg/dL    Bilirubin Negative Negative    Urobilinogen, Urine 0.2 Negative    Nitrite Negative Negative    Leukocyte Esterase Negative Negative    Occult Blood Negative Negative    Micro Urine Req see below    ESTIMATED GFR   Result Value Ref Range    GFR (CKD-EPI) 71 >60 mL/min/1.73 m 2       RADIOLOGY  I have independently interpreted the diagnostic imaging associated with this visit and am waiting the final reading from the radiologist.   My preliminary interpretation is as follows: Renal colic CT no kidney stone or hydronephrosis  Radiologist interpretation:    CT-RENAL COLIC EVALUATION(A/P W/O)   Final Result      1.  No acute abnormality in the abdomen or pelvis. No renal stones or hydronephrosis.   2.  Hepatosplenomegaly.            COURSE & MEDICAL DECISION MAKING    ED Observation Status? Yes; I am placing the patient in to an observation status due to a diagnostic uncertainty as well as therapeutic intensity. Patient placed in observation status at 1:45 PM, 9/23/2023.     Observation plan is as follows: Lab work pain medication IV fluids and renal colic CT    Upon Reevaluation, the patient's condition has: Improved; and will be discharged.    Patient discharged from ED Observation status at 4:28 PM  (Time) 9/23/23 (Date).     INITIAL ASSESSMENT, COURSE AND PLAN  Care Narrative: Lorene Haro is a 48 y.o. female who presents planing of left flank pain rating into her left lower abdomen that  started about a week ago.  Nothing makes it better or worse she describes it as an intermittent sharp pain.  She denies any nausea or vomiting dysuria frequency or urgency blood in her urine or diarrhea.  She has never had symptoms like this before.  She has not had diarrhea but states her stools are softer than normal she denies any falls or trauma.  She denies any leg weakness or numbness.  Physical exam she is uncomfortable and she has some mild left flank tenderness rating into her left lower quadrant she has no rebound masses or peritoneal signs  HYDRATION: Based on the patient's presentation of Acute Diarrhea the patient was given IV fluids. IV Hydration was used because oral hydration was not adequate alone. Upon recheck following hydration, the patient was improved.   differential diagnosis: Kidney stone, nephritis, diverticulitis, colitis, gastroenteritis  ADDITIONAL PROBLEM LIST  Atrial fibrillation  Acute kidney injury  Asthma  Bipolar disorder  Schizophrenia  COPD on home oxygen  Hypertension  DISPOSITION AND DISCUSSIONS    Patient's CBC is normal except for a mildly low hemoglobin 11.8 with 74% neutrophils platelet count is normal at 252 white count is normal at 9.8.  Lipase is normal at 29.  Comprehensive metabolic panel has a slightly elevated glucose of 107 and a slightly elevated alk phos of 140 otherwise the patient's electrolytes kidney function and liver function tests are normal.  Patient's urine is negative for nitrites leukocyte esterase or blood.    Patient was given IV Toradol for pain and IV fluid because her stools have been loose.    Patient's renal colic CT shows no stones or hydronephrosis.  Upon recheck the patient states that the Toradol did improve her symptoms and she was able to sleep for a little while.  I will discharge her home and advised her to take over-the-counter Tylenol and Motrin and avoid heavy lifting bending and twisting.  She should return for any worsening pain  fevers vomiting or worsening symptoms.  I have discussed management of the patient with the following physicians and JAY's:  none    Discussion of management with other \[facility]\"" or appropriate source(s): None     Escalation of care considered, and ultimately not performed: none    Barriers to care at this time, including but not limited to:  none.     Decision tools and prescription drugs considered including, but not limited to: Pain Medications toradol .  The patient will return for new or worsening symptoms and is stable at the time of discharge.    The patient is referred to a primary physician for blood pressure management, diabetic screening, and for all other preventative health concerns.      DISPOSITION:  Patient will be discharged home in stable condition.    FOLLOW UP:  Heriberto Sharp M.D.  [address] [facility] [address]  [phone]    Call in 2 days  for recheck      OUTPATIENT MEDICATIONS:  New Prescriptions    No medications on file   Otc tylenol and motrin    FINAL DIAGNOSIS  1. Flank pain    2. Myofascial pain           Electronically signed by: Viri Nazario M.D., 9/23/2023 1:44 PM

## 2023-09-24 NOTE — ED NOTES
MTM called for pt   D/c instructions signed and given, pt denies further questions/concerns. Reviewed negative/clear results, discussed OTC for pain. RN wheeled pt out to lobby exit to await ride home. Pt d/c A&O x4, stable and cleared, w/ all belongings

## 2023-09-27 ENCOUNTER — NON-PROVIDER VISIT (OUTPATIENT)
Dept: CARDIOLOGY | Facility: MEDICAL CENTER | Age: 48
End: 2023-09-27
Payer: MEDICAID

## 2023-09-27 PROCEDURE — 93298 REM INTERROG DEV EVAL SCRMS: CPT | Performed by: INTERNAL MEDICINE

## 2023-09-28 NOTE — CARDIAC REMOTE MONITOR - SCAN
Device transmission reviewed. Device demonstrated appropriate function.       Electronically Signed by: Tyrell Solomon M.D.    10/5/2023  10:03 AM

## 2023-10-12 ENCOUNTER — HOSPITAL ENCOUNTER (OUTPATIENT)
Facility: MEDICAL CENTER | Age: 48
End: 2023-10-13
Attending: EMERGENCY MEDICINE | Admitting: INTERNAL MEDICINE
Payer: MEDICAID

## 2023-10-12 ENCOUNTER — APPOINTMENT (OUTPATIENT)
Dept: RADIOLOGY | Facility: MEDICAL CENTER | Age: 48
End: 2023-10-12
Attending: EMERGENCY MEDICINE
Payer: MEDICAID

## 2023-10-12 DIAGNOSIS — J06.9 UPPER RESPIRATORY TRACT INFECTION, UNSPECIFIED TYPE: ICD-10-CM

## 2023-10-12 DIAGNOSIS — J44.1 COPD WITH EXACERBATION (HCC): ICD-10-CM

## 2023-10-12 DIAGNOSIS — R07.9 ACUTE CHEST PAIN: ICD-10-CM

## 2023-10-12 LAB
ALBUMIN SERPL BCP-MCNC: 3.7 G/DL (ref 3.2–4.9)
ALBUMIN/GLOB SERPL: 1.2 G/DL
ALP SERPL-CCNC: 143 U/L (ref 30–99)
ALT SERPL-CCNC: 14 U/L (ref 2–50)
ANION GAP SERPL CALC-SCNC: 7 MMOL/L (ref 7–16)
APPEARANCE UR: CLEAR
AST SERPL-CCNC: 12 U/L (ref 12–45)
BASOPHILS # BLD AUTO: 0.2 % (ref 0–1.8)
BASOPHILS # BLD: 0.03 K/UL (ref 0–0.12)
BILIRUB SERPL-MCNC: 0.6 MG/DL (ref 0.1–1.5)
BILIRUB UR QL STRIP.AUTO: NEGATIVE
BUN SERPL-MCNC: 16 MG/DL (ref 8–22)
CALCIUM ALBUM COR SERPL-MCNC: 9.8 MG/DL (ref 8.5–10.5)
CALCIUM SERPL-MCNC: 9.6 MG/DL (ref 8.5–10.5)
CHLORIDE SERPL-SCNC: 106 MMOL/L (ref 96–112)
CO2 SERPL-SCNC: 31 MMOL/L (ref 20–33)
COLOR UR: YELLOW
CREAT SERPL-MCNC: 0.87 MG/DL (ref 0.5–1.4)
EKG IMPRESSION: NORMAL
EOSINOPHIL # BLD AUTO: 0.14 K/UL (ref 0–0.51)
EOSINOPHIL NFR BLD: 1.2 % (ref 0–6.9)
ERYTHROCYTE [DISTWIDTH] IN BLOOD BY AUTOMATED COUNT: 48.3 FL (ref 35.9–50)
FLUAV RNA SPEC QL NAA+PROBE: NEGATIVE
FLUBV RNA SPEC QL NAA+PROBE: NEGATIVE
GFR SERPLBLD CREATININE-BSD FMLA CKD-EPI: 81 ML/MIN/1.73 M 2
GLOBULIN SER CALC-MCNC: 3.1 G/DL (ref 1.9–3.5)
GLUCOSE SERPL-MCNC: 79 MG/DL (ref 65–99)
GLUCOSE UR STRIP.AUTO-MCNC: NEGATIVE MG/DL
HCT VFR BLD AUTO: 39.4 % (ref 37–47)
HGB BLD-MCNC: 12.1 G/DL (ref 12–16)
IMM GRANULOCYTES # BLD AUTO: 0.04 K/UL (ref 0–0.11)
IMM GRANULOCYTES NFR BLD AUTO: 0.3 % (ref 0–0.9)
KETONES UR STRIP.AUTO-MCNC: NEGATIVE MG/DL
LEUKOCYTE ESTERASE UR QL STRIP.AUTO: NEGATIVE
LYMPHOCYTES # BLD AUTO: 1.98 K/UL (ref 1–4.8)
LYMPHOCYTES NFR BLD: 16.4 % (ref 22–41)
MCH RBC QN AUTO: 26.4 PG (ref 27–33)
MCHC RBC AUTO-ENTMCNC: 30.7 G/DL (ref 32.2–35.5)
MCV RBC AUTO: 85.8 FL (ref 81.4–97.8)
MICRO URNS: NORMAL
MONOCYTES # BLD AUTO: 0.8 K/UL (ref 0–0.85)
MONOCYTES NFR BLD AUTO: 6.6 % (ref 0–13.4)
NEUTROPHILS # BLD AUTO: 9.12 K/UL (ref 1.82–7.42)
NEUTROPHILS NFR BLD: 75.3 % (ref 44–72)
NITRITE UR QL STRIP.AUTO: NEGATIVE
NRBC # BLD AUTO: 0 K/UL
NRBC BLD-RTO: 0 /100 WBC (ref 0–0.2)
PH UR STRIP.AUTO: 5.5 [PH] (ref 5–8)
PLATELET # BLD AUTO: 258 K/UL (ref 164–446)
PMV BLD AUTO: 11 FL (ref 9–12.9)
POTASSIUM SERPL-SCNC: 4 MMOL/L (ref 3.6–5.5)
PROCALCITONIN SERPL-MCNC: 0.07 NG/ML
PROT SERPL-MCNC: 6.8 G/DL (ref 6–8.2)
PROT UR QL STRIP: NEGATIVE MG/DL
RBC # BLD AUTO: 4.59 M/UL (ref 4.2–5.4)
RBC UR QL AUTO: NEGATIVE
RSV RNA SPEC QL NAA+PROBE: NEGATIVE
S PYO DNA SPEC NAA+PROBE: NOT DETECTED
SARS-COV-2 RNA RESP QL NAA+PROBE: NOTDETECTED
SODIUM SERPL-SCNC: 144 MMOL/L (ref 135–145)
SP GR UR STRIP.AUTO: 1.03
TROPONIN T SERPL-MCNC: 50 NG/L (ref 6–19)
UROBILINOGEN UR STRIP.AUTO-MCNC: 0.2 MG/DL
WBC # BLD AUTO: 12.1 K/UL (ref 4.8–10.8)

## 2023-10-12 PROCEDURE — 0241U HCHG SARS-COV-2 COVID-19 NFCT DS RESP RNA 4 TRGT ED POC: CPT

## 2023-10-12 PROCEDURE — G0378 HOSPITAL OBSERVATION PER HR: HCPCS

## 2023-10-12 PROCEDURE — 93005 ELECTROCARDIOGRAM TRACING: CPT | Performed by: EMERGENCY MEDICINE

## 2023-10-12 PROCEDURE — 87651 STREP A DNA AMP PROBE: CPT

## 2023-10-12 PROCEDURE — 36415 COLL VENOUS BLD VENIPUNCTURE: CPT

## 2023-10-12 PROCEDURE — 71045 X-RAY EXAM CHEST 1 VIEW: CPT

## 2023-10-12 PROCEDURE — 85027 COMPLETE CBC AUTOMATED: CPT | Mod: XU

## 2023-10-12 PROCEDURE — 99223 1ST HOSP IP/OBS HIGH 75: CPT | Performed by: INTERNAL MEDICINE

## 2023-10-12 PROCEDURE — 99285 EMERGENCY DEPT VISIT HI MDM: CPT

## 2023-10-12 PROCEDURE — 700102 HCHG RX REV CODE 250 W/ 637 OVERRIDE(OP): Mod: UD | Performed by: EMERGENCY MEDICINE

## 2023-10-12 PROCEDURE — 81003 URINALYSIS AUTO W/O SCOPE: CPT

## 2023-10-12 PROCEDURE — 85025 COMPLETE CBC W/AUTO DIFF WBC: CPT

## 2023-10-12 PROCEDURE — 84145 PROCALCITONIN (PCT): CPT

## 2023-10-12 PROCEDURE — 80053 COMPREHEN METABOLIC PANEL: CPT

## 2023-10-12 PROCEDURE — 80048 BASIC METABOLIC PNL TOTAL CA: CPT

## 2023-10-12 PROCEDURE — C9803 HOPD COVID-19 SPEC COLLECT: HCPCS

## 2023-10-12 PROCEDURE — A9270 NON-COVERED ITEM OR SERVICE: HCPCS | Mod: UD | Performed by: EMERGENCY MEDICINE

## 2023-10-12 PROCEDURE — 84484 ASSAY OF TROPONIN QUANT: CPT

## 2023-10-12 PROCEDURE — 700102 HCHG RX REV CODE 250 W/ 637 OVERRIDE(OP): Mod: UD | Performed by: NURSE PRACTITIONER

## 2023-10-12 PROCEDURE — A9270 NON-COVERED ITEM OR SERVICE: HCPCS | Mod: UD | Performed by: NURSE PRACTITIONER

## 2023-10-12 RX ORDER — ARIPIPRAZOLE 15 MG/1
30 TABLET ORAL EVERY MORNING
Status: DISCONTINUED | OUTPATIENT
Start: 2023-10-13 | End: 2023-10-13 | Stop reason: HOSPADM

## 2023-10-12 RX ORDER — AMOXICILLIN 250 MG
2 CAPSULE ORAL 2 TIMES DAILY PRN
Status: DISCONTINUED | OUTPATIENT
Start: 2023-10-12 | End: 2023-10-13 | Stop reason: HOSPADM

## 2023-10-12 RX ORDER — ASPIRIN 81 MG/1
324 TABLET, CHEWABLE ORAL ONCE
Status: COMPLETED | OUTPATIENT
Start: 2023-10-12 | End: 2023-10-12

## 2023-10-12 RX ORDER — ACETAMINOPHEN 325 MG/1
650 TABLET ORAL EVERY 6 HOURS PRN
Status: DISCONTINUED | OUTPATIENT
Start: 2023-10-12 | End: 2023-10-13 | Stop reason: HOSPADM

## 2023-10-12 RX ORDER — POTASSIUM CHLORIDE 750 MG/1
20 TABLET, FILM COATED, EXTENDED RELEASE ORAL DAILY
Status: DISCONTINUED | OUTPATIENT
Start: 2023-10-13 | End: 2023-10-13 | Stop reason: HOSPADM

## 2023-10-12 RX ORDER — ACETAMINOPHEN, DEXTROMETHORPHAN HYDROBROMIDE, DOXYLAMINE SUCCINATE, AND PHENYLEPHRINE HYDROCHLORIDE 10; 12.5; 20; 65 MG/30ML; MG/30ML; MG/30ML; MG/30ML
30 SOLUTION ORAL
COMMUNITY
End: 2023-11-04

## 2023-10-12 RX ORDER — DIPHENHYDRAMINE HCL 25 MG
30 CAPSULE ORAL EVERY 6 HOURS PRN
COMMUNITY
End: 2023-11-04

## 2023-10-12 RX ORDER — ATORVASTATIN CALCIUM 20 MG/1
20 TABLET, FILM COATED ORAL
Status: DISCONTINUED | OUTPATIENT
Start: 2023-10-13 | End: 2023-10-13 | Stop reason: HOSPADM

## 2023-10-12 RX ORDER — FLUTICASONE PROPIONATE 50 MCG
2 SPRAY, SUSPENSION (ML) NASAL DAILY
Status: DISCONTINUED | OUTPATIENT
Start: 2023-10-13 | End: 2023-10-12

## 2023-10-12 RX ORDER — MONTELUKAST SODIUM 10 MG/1
10 TABLET ORAL EVERY EVENING
Status: DISCONTINUED | OUTPATIENT
Start: 2023-10-13 | End: 2023-10-13 | Stop reason: HOSPADM

## 2023-10-12 RX ORDER — POLYETHYLENE GLYCOL 3350 17 G/17G
1 POWDER, FOR SOLUTION ORAL
Status: DISCONTINUED | OUTPATIENT
Start: 2023-10-12 | End: 2023-10-13 | Stop reason: HOSPADM

## 2023-10-12 RX ORDER — POTASSIUM CHLORIDE 1.5 G/1.58G
20 POWDER, FOR SOLUTION ORAL DAILY
COMMUNITY
End: 2023-11-04

## 2023-10-12 RX ORDER — GUAIFENESIN/DEXTROMETHORPHAN 100-10MG/5
10 SYRUP ORAL EVERY 6 HOURS PRN
Status: DISCONTINUED | OUTPATIENT
Start: 2023-10-12 | End: 2023-10-13 | Stop reason: HOSPADM

## 2023-10-12 RX ORDER — IPRATROPIUM BROMIDE AND ALBUTEROL SULFATE 2.5; .5 MG/3ML; MG/3ML
3 SOLUTION RESPIRATORY (INHALATION)
Status: DISCONTINUED | OUTPATIENT
Start: 2023-10-12 | End: 2023-10-13 | Stop reason: HOSPADM

## 2023-10-12 RX ORDER — BISACODYL 10 MG
10 SUPPOSITORY, RECTAL RECTAL
Status: DISCONTINUED | OUTPATIENT
Start: 2023-10-12 | End: 2023-10-13 | Stop reason: HOSPADM

## 2023-10-12 RX ORDER — ALBUTEROL SULFATE 90 UG/1
2 AEROSOL, METERED RESPIRATORY (INHALATION) EVERY 6 HOURS PRN
Status: DISCONTINUED | OUTPATIENT
Start: 2023-10-12 | End: 2023-10-13 | Stop reason: HOSPADM

## 2023-10-12 RX ORDER — ASPIRIN 81 MG/1
81 TABLET ORAL DAILY
Status: DISCONTINUED | OUTPATIENT
Start: 2023-10-13 | End: 2023-10-13 | Stop reason: HOSPADM

## 2023-10-12 RX ADMIN — ACETAMINOPHEN 650 MG: 325 TABLET, FILM COATED ORAL at 21:10

## 2023-10-12 RX ADMIN — ASPIRIN 324 MG: 81 TABLET, CHEWABLE ORAL at 18:10

## 2023-10-12 RX ADMIN — GUAIFENESIN SYRUP AND DEXTROMETHORPHAN 10 ML: 100; 10 SYRUP ORAL at 20:48

## 2023-10-12 ASSESSMENT — ENCOUNTER SYMPTOMS
HEARTBURN: 0
CHILLS: 1
SPUTUM PRODUCTION: 0
DEPRESSION: 0
VOMITING: 0
PND: 0
ABDOMINAL PAIN: 0
CONSTIPATION: 0
BLOOD IN STOOL: 0
MYALGIAS: 1
SINUS PAIN: 0
NERVOUS/ANXIOUS: 0
COUGH: 1
DIARRHEA: 0
WEIGHT LOSS: 0
EYES NEGATIVE: 1
PALPITATIONS: 0
DIAPHORESIS: 0
ORTHOPNEA: 0
FEVER: 1
SORE THROAT: 1
SHORTNESS OF BREATH: 1
NAUSEA: 1

## 2023-10-12 ASSESSMENT — LIFESTYLE VARIABLES
CONSUMPTION TOTAL: NEGATIVE
EVER FELT BAD OR GUILTY ABOUT YOUR DRINKING: NO
ON A TYPICAL DAY WHEN YOU DRINK ALCOHOL HOW MANY DRINKS DO YOU HAVE: 0
HAVE YOU EVER FELT YOU SHOULD CUT DOWN ON YOUR DRINKING: NO
TOTAL SCORE: 0
DOES PATIENT WANT TO STOP DRINKING: NO
ALCOHOL_USE: NO
AVERAGE NUMBER OF DAYS PER WEEK YOU HAVE A DRINK CONTAINING ALCOHOL: 0
HAVE PEOPLE ANNOYED YOU BY CRITICIZING YOUR DRINKING: NO
EVER HAD A DRINK FIRST THING IN THE MORNING TO STEADY YOUR NERVES TO GET RID OF A HANGOVER: NO
HOW MANY TIMES IN THE PAST YEAR HAVE YOU HAD 5 OR MORE DRINKS IN A DAY: 0

## 2023-10-12 ASSESSMENT — PAIN DESCRIPTION - PAIN TYPE
TYPE: ACUTE PAIN
TYPE: ACUTE PAIN

## 2023-10-12 ASSESSMENT — FIBROSIS 4 INDEX
FIB4 SCORE: 0.62
FIB4 SCORE: 0.8
FIB4 SCORE: 0.62

## 2023-10-12 NOTE — ED PROVIDER NOTES
ER Provider Note    Scribed for Gabriel Mohamud M.d. by Renetta Ko. 10/12/2023  4:24 PM    Primary Care Provider: Heriberto Sharp M.D.    CHIEF COMPLAINT  Chief Complaint   Patient presents with    Flu Like Symptoms     The pt reports body aches, fatigue, cough with phlegm production, sore throat, and decreased appetite. Symptoms started Saturday.     EXTERNAL RECORDS REVIEWED  Patient had a stress test on 8/9 which was negative    HPI/ROS  LIMITATION TO HISTORY   Select: : None  OUTSIDE HISTORIAN(S):  None    Lorene Haro is a 48 y.o. female who presents to the ED complaining of worsening flu like symptoms onset five days ago. Patient stated that it started out as a sore throat but now can barely talk. Denies any jaw or dental pain. She explains that it hurts the worst when she swallows and coughs. There is some chest pressure. Patient has a cough with phlegm production. She denies any recent antibiotics and stated that she has had three COVID vaccines. Denies any vomiting or diarrhea. The patient does note some soft stool.     PAST MEDICAL HISTORY  Past Medical History:   Diagnosis Date    A-fib (H[facility])     MONI (acute kidney injury) ([facility]) 8/9/2023    Asthma     Bipolar 2 disorder ([facility])     Chickenpox     Chronic obstructive pulmonary disease ([facility])     Hypertension     On home oxygen therapy     Other psychological stress     Schizophrenic disorder ([facility])     Yeast infection of the skin 04/01/2021       SURGICAL HISTORY  Past Surgical History:   Procedure Laterality Date    CHOLECYSTECTOMY      COLECTOMY      HYSTERECTOMY LAPAROSCOPY      KNEE ARTHROSCOPY Left        FAMILY HISTORY  Family History   Problem Relation Age of Onset    No Known Problems Mother     Cancer Sister        SOCIAL HISTORY   reports that she quit smoking about a year ago. Her smoking use included cigarettes. She has never used smokeless tobacco. She reports that she does not currently use alcohol. She reports that she does not use  "drugs.    CURRENT MEDICATIONS  Previous Medications    ADVAIR DISKUS 100-50 MCG/ACT AEROSOL POWDER, BREATH ACTIVATED        ALBUTEROL 108 (90 BASE) MCG/ACT AERO SOLN INHALATION AEROSOL    Inhale 2 Puffs every 6 hours as needed for Shortness of Breath.    ARIPIPRAZOLE (ABILIFY) 30 MG TABLET    Take 1 Tablet by mouth every morning. Indications: Major Depressive Disorder    ASPIRIN 81 MG EC TABLET    Take 81 mg by mouth every day.    ATORVASTATIN (LIPITOR) 20 MG TAB    Take 1 Tablet by mouth at bedtime.    FAMOTIDINE (PEPCID) 20 MG TAB    Take 1 Tablet by mouth 2 times a day as needed (pain). For upper belly pain    FLUTICASONE (FLONASE) 50 MCG/ACT NASAL SPRAY        FUROSEMIDE (LASIX) 20 MG TAB    Take 1 Tablet by mouth every day.    METHOCARBAMOL (ROBAXIN) 750 MG TAB    Take 2 Tablets by mouth 3 times a day.    METOPROLOL SR (TOPROL XL) 25 MG TABLET SR 24 HR    Take 25 mg by mouth every day.    MONTELUKAST (SINGULAIR) 10 MG TAB    Take 1 Tablet by mouth every evening.       ALLERGIES  Penicillin g and Amoxicillin    PHYSICAL EXAM  /53   Pulse 61   Temp (!) 35.6 °C (96 °F) (Oral)   Resp 18   Ht 1.575 m (5' 2\")   Wt 123 kg (271 lb 9.7 oz)   LMP  (LMP Unknown)   SpO2 95%   BMI 49.68 kg/m²   Constitutional: Alert in no apparent distress.  HENT: No signs of trauma, Bilateral external ears normal, Nose normal. Uvula midline, Mild posterior erythema.   Eyes: Pupils are equal and reactive, Conjunctiva normal, Non-icteric.   Neck: Normal range of motion, No tenderness, Supple, No stridor.   Lymphatic: No lymphadenopathy noted.   Cardiovascular: Regular rate and rhythm, no murmurs.   Thorax & Lungs: Normal breath sounds, No respiratory distress, No wheezing, No chest tenderness.   Abdomen: Bowel sounds normal, Soft, No tenderness, No peritoneal signs, No masses, No pulsatile masses.   Skin: Warm, Dry, No erythema, No rash.   Back: No bony tenderness, No CVA tenderness.   Extremities: Intact distal pulses, No " edema, No tenderness, No cyanosis.  Musculoskeletal: Good range of motion in all major joints. No tenderness to palpation or major deformities noted.   Neurologic: Alert , Normal motor function, Normal sensory function, No focal deficits noted.   Psychiatric: Affect normal, Judgment normal, Mood normal.      DIAGNOSTIC STUDIES    Labs:   Labs Reviewed   CBC WITH DIFFERENTIAL - Abnormal; Notable for the following components:       Result Value    WBC 12.1 (*)     MCH 26.4 (*)     MCHC 30.7 (*)     Neutrophils-Polys 75.30 (*)     Lymphocytes 16.40 (*)     Neutrophils (Absolute) 9.12 (*)     All other components within normal limits    Narrative:     Biotin intake of greater than 5 mg per day may interfere with  troponin levels, causing false low values.   COMP METABOLIC PANEL - Abnormal; Notable for the following components:    Alkaline Phosphatase 143 (*)     All other components within normal limits    Narrative:     Biotin intake of greater than 5 mg per day may interfere with  troponin levels, causing false low values.   TROPONIN - Abnormal; Notable for the following components:    Troponin T 50 (*)     All other components within normal limits    Narrative:     Biotin intake of greater than 5 mg per day may interfere with  troponin levels, causing false low values.   GROUP A STREP BY PCR    Narrative:     Release to patient->Immediate   ESTIMATED GFR    Narrative:     Biotin intake of greater than 5 mg per day may interfere with  troponin levels, causing false low values.   URINALYSIS    Narrative:     Release to patient->Immediate   PROCALCITONIN    Narrative:     Biotin intake of greater than 5 mg per day may interfere with  troponin levels, causing false low values.   BASIC METABOLIC PANEL   CBC WITHOUT DIFFERENTIAL   TROPONIN   POCT COV-2, FLU A/B, RSV BY PCR   POC COV-2, FLU A/B, RSV BY PCR        Radiology:   The attending emergency physician has independently interpreted the diagnostic imaging associated  with this visit and am waiting the final reading from the radiologist.   Preliminary interpretation is a follows: no ptx  Radiologist interpretation:   DX-CHEST-PORTABLE (1 VIEW)   Final Result      Hypoinflation without other evidence for acute cardiopulmonary disease.           COURSE & MEDICAL DECISION MAKING     ED Observation Status? No; Patient does not meet criteria for ED Observation.     INITIAL ASSESSMENT, COURSE AND PLAN  Care Narrative:   4:26 PM - Patient seen and examined at bedside. Patient is a 48 year old female who presents to the ED with worsening flu like symptoms. She states that it hurts to swallow and that she has a productive cough. Discussed plan of care, including a diagnostic work up with labs and imaging. Instructed the patient of possible viral etiology and at home symptom management. Patient agrees to the plan of care. Ordered for DX-Chest, EKG, Group A Strep by PCR, POC CoV-2, Flu A/B, RSV by PCR, and CBC w/ DIff to evaluate her symptoms.      No: muffled voice, drooling, stridor, tripoding, trismus, crepitus or trauma.   No e/o pna on CXR.    Hx not c/w PE given other infectious sx present  Unremarkable VS upon dc  No e/o stridor or tongue elevation and pt w/ FROM of neck w/o pain, doubt deep space neck infection  No e/o PTA on exam  No rash or e/o cellulitis     Heart score 4, patient with URI symptoms and troponin more elevated than prior.  Given recent negative stress test plan for admission and serial troponins    DISPOSITION AND DISCUSSIONS  I have discussed management of the patient with the following physicians and JAY's:  Dr. Esteban (Hospitalist)      Decision tools and prescription drugs considered including, but not limited to: HEART Score 4 .    DISPOSITION:  Patient will be hospitalized by Dr. Esteban (Hospitalist) in guarded condition.   FINAL DIANGOSIS  1. Acute chest pain    2. Upper respiratory tract infection, unspecified type       I, Renetta Ko (Scribraul), servando scribing  for, and in the presence of, Gabriel Mohamud M.D..    Electronically signed by: Renetta Ko (Scribe), 10/12/2023    IGabriel M.D. personally performed the services described in this documentation, as scribed by Renetta Ko in my presence, and it is both accurate and complete.      The note accurately reflects work and decisions made by me.  Gabriel Mohamud M.D.  10/12/2023  11:52 PM

## 2023-10-12 NOTE — ED TRIAGE NOTES
"Chief Complaint   Patient presents with    Flu Like Symptoms     The pt reports body aches, fatigue, cough with phlegm production, sore throat, and decreased appetite. Symptoms started Saturday.       Pt ambulatory to triage. Pt A&Ox4, for the above complaint.     Pt to lobby . Pt educated on alerting staff in changes to condition. Pt verbalized understanding. Protocol COVID ordered.     /52   Pulse 78   Temp 36 °C (96.8 °F) (Temporal)   Resp 16   Ht 1.575 m (5' 2\")   Wt 123 kg (271 lb 9.7 oz)   LMP  (LMP Unknown)   SpO2 94%   BMI 49.68 kg/m²     "

## 2023-10-13 ENCOUNTER — PHARMACY VISIT (OUTPATIENT)
Dept: PHARMACY | Facility: MEDICAL CENTER | Age: 48
End: 2023-10-13
Payer: COMMERCIAL

## 2023-10-13 VITALS
DIASTOLIC BLOOD PRESSURE: 50 MMHG | TEMPERATURE: 97.1 F | SYSTOLIC BLOOD PRESSURE: 103 MMHG | HEART RATE: 67 BPM | HEIGHT: 62 IN | OXYGEN SATURATION: 96 % | BODY MASS INDEX: 49.45 KG/M2 | RESPIRATION RATE: 20 BRPM | WEIGHT: 268.74 LBS

## 2023-10-13 LAB
D DIMER PPP IA.FEU-MCNC: <0.27 UG/ML (FEU) (ref 0–0.5)
ERYTHROCYTE [DISTWIDTH] IN BLOOD BY AUTOMATED COUNT: 48.4 FL (ref 35.9–50)
HCT VFR BLD AUTO: 37.4 % (ref 37–47)
HGB BLD-MCNC: 11.4 G/DL (ref 12–16)
MCH RBC QN AUTO: 26.1 PG (ref 27–33)
MCHC RBC AUTO-ENTMCNC: 30.5 G/DL (ref 32.2–35.5)
MCV RBC AUTO: 85.6 FL (ref 81.4–97.8)
PLATELET # BLD AUTO: 229 K/UL (ref 164–446)
PMV BLD AUTO: 11.4 FL (ref 9–12.9)
RBC # BLD AUTO: 4.37 M/UL (ref 4.2–5.4)
TROPONIN T SERPL-MCNC: 37 NG/L (ref 6–19)
TROPONIN T SERPL-MCNC: 38 NG/L (ref 6–19)
WBC # BLD AUTO: 10.1 K/UL (ref 4.8–10.8)

## 2023-10-13 PROCEDURE — 700102 HCHG RX REV CODE 250 W/ 637 OVERRIDE(OP): Mod: UD | Performed by: NURSE PRACTITIONER

## 2023-10-13 PROCEDURE — G0378 HOSPITAL OBSERVATION PER HR: HCPCS

## 2023-10-13 PROCEDURE — RXMED WILLOW AMBULATORY MEDICATION CHARGE: Performed by: NURSE PRACTITIONER

## 2023-10-13 PROCEDURE — 99239 HOSP IP/OBS DSCHRG MGMT >30: CPT | Performed by: INTERNAL MEDICINE

## 2023-10-13 PROCEDURE — 700101 HCHG RX REV CODE 250: Mod: UD | Performed by: NURSE PRACTITIONER

## 2023-10-13 PROCEDURE — 85379 FIBRIN DEGRADATION QUANT: CPT

## 2023-10-13 PROCEDURE — A9270 NON-COVERED ITEM OR SERVICE: HCPCS | Mod: UD | Performed by: NURSE PRACTITIONER

## 2023-10-13 PROCEDURE — 94640 AIRWAY INHALATION TREATMENT: CPT

## 2023-10-13 PROCEDURE — 84484 ASSAY OF TROPONIN QUANT: CPT

## 2023-10-13 RX ORDER — AZITHROMYCIN 250 MG/1
TABLET, FILM COATED ORAL
Qty: 6 TABLET | Refills: 0 | Status: SHIPPED | OUTPATIENT
Start: 2023-10-13 | End: 2023-10-18

## 2023-10-13 RX ORDER — METHYLPREDNISOLONE 4 MG/1
TABLET ORAL
Qty: 21 TABLET | Refills: 0 | Status: ON HOLD | OUTPATIENT
Start: 2023-10-13 | End: 2023-11-05

## 2023-10-13 RX ADMIN — ARIPIPRAZOLE 30 MG: 15 TABLET ORAL at 05:06

## 2023-10-13 RX ADMIN — MOMETASONE FUROATE AND FORMOTEROL FUMARATE DIHYDRATE 1 PUFF: 200; 5 AEROSOL RESPIRATORY (INHALATION) at 01:02

## 2023-10-13 RX ADMIN — ASPIRIN 81 MG: 81 TABLET, COATED ORAL at 05:06

## 2023-10-13 RX ADMIN — POTASSIUM CHLORIDE 20 MEQ: 750 TABLET, EXTENDED RELEASE ORAL at 05:06

## 2023-10-13 RX ADMIN — MOMETASONE FUROATE AND FORMOTEROL FUMARATE DIHYDRATE 1 PUFF: 200; 5 AEROSOL RESPIRATORY (INHALATION) at 05:05

## 2023-10-13 RX ADMIN — IPRATROPIUM BROMIDE AND ALBUTEROL SULFATE 3 ML: .5; 2.5 SOLUTION RESPIRATORY (INHALATION) at 10:35

## 2023-10-13 NOTE — PROGRESS NOTES
Patient ready for discharge. [facility] ACT-A removed PIV. Patient is dressed and confirmed ride with . Ordered discharge lounge per protocol. Answered all questions and concerns.

## 2023-10-13 NOTE — DISCHARGE PLANNING
Case Management Discharge Planning    Admission Date: 10/12/2023  GMLOS:    ALOS: 0    6-Clicks ADL Score:    6-Clicks Mobility Score:    PT and/or OT Eval ordered: No  Post-acute Referrals Ordered: No  Post-acute Choice Obtained: No  Has referral(s) been sent to post-acute provider:  No      Anticipated Discharge Dispo: Discharge Disposition: Discharged to home/self care (01)     DME Needed: No, patient has their own home oxygen at 2LPM at night.    Action(s) Taken: Updated Provider/Nurse on Discharge Plan and OTHER: RN CM spoke with the patient at the bedside regarding discharge plan. Patient indicates living at [facility] and provided the address [address] [place] [address]. Patient has [facility] benefit. RN CM called [facility] to set up transportation for 1030. Attending and Bedside RN notified.    Escalations Completed: Provider, Transportation Vendor, and Bedside RN    Medically Clear: Yes    Next Steps: Follow-up with the Attending and Bedside RN for any issues/changes and update the discharge plan accordingly.    Barriers to Discharge: None    Is the patient up for discharge tomorrow: No, patient is set to discharge home today to her group [facility] via [facility] transporation at 1030.

## 2023-10-13 NOTE — PROGRESS NOTES
4 Eyes Skin Assessment Completed by JC Medeiros and JC Escobar.    Head WDL  Ears WDL  Nose WDL  Mouth WDL  Neck WDL  Breast/Chest WDL  Shoulder Blades WDL  Spine WDL  (R) Arm/Elbow/Hand WDL  (L) Arm/Elbow/Hand WDL  Abdomen WDL  Groin WDL  Scrotum/Coccyx/Buttocks WDL  (R) Leg Redness, Blanching, and Swelling  (L) Leg Redness, Blanching, and Swelling  (R) Heel/Foot/Toe Swelling  (L) Heel/Foot/Toe Swelling          Devices In Places Tele Box, Blood Pressure Cuff, Pulse Ox, and Nasal Cannula      Interventions In Place Gray Ear Foams and Pressure Redistribution Mattress    Possible Skin Injury No    Pictures Uploaded Into Epic N/A  Wound Consult Placed N/A  RN Wound Prevention Protocol Ordered No

## 2023-10-13 NOTE — ED NOTES
Med rec completed per patient at bedside.  Patient is a resident at [facility], however MAR was not sent with patient. Attempted to contact facility at phone number listed in patient's emergency contacts ([phone]) and phone number on [facility]'s website ([phone]), but received no answer at either number.  Allergies reviewed with patient.  Patient's preferred pharmacy: [facility].    Patient states she had dental work (tooth extraction) on 9/8/2023, however she is unsure whether she was on antibiotics afterward. Unable to verify antibiotic usage with patient's [facility] due to being unable to reach staff.

## 2023-10-13 NOTE — HOSPITAL COURSE
Ms. Lorene Haro is a 48 y.o. female past medical history of paroxysmal atrial fibrillation, chronic anticoagulation with warfarin, KATHIA, restrictive lung disease, undifferentiated schizophrenia, GERD who presented to the ED on 10/12/2023 with complaints of 4-day history sore throat, myalgias, fatigue and chest pain.      Patient says that she began having a sore throat 4 days ago which has progressively gotten worse, hurts when she swallows or coughs. She reports chest pain, says that she feels someone is sitting on her chest, but pain is worse with deep breathing and cough.  She also reports persistent myalgias and fatigue. She went out to dinner last night and felt dizzy afterwards.   Chest x-ray obtained in the ED is negative for acute findings, shows some hypoinflation.  CBC shows WBC 12.1, ANC 9.1, otherwise unremarkable.  CMP is unremarkable.  Troponin T is mildly elevated 50.  EKG is normal sinus rhythm, 63 bpm, no acute ST-T changes. She was given ASA in the ED and admitted to the CDU.    Patient was monitored overnight with no events noted.  Cardiac stress test was deferred as patient denied any current chest pain, troponin trend plateau.  No infectious process was noted.  Patient likely having COPD exacerbation of which we will place patient on azithromycin and Medrol Dosepak to go home with.  Patient seen and examined prior to being discharged.  Patient denies any chest pain, no abdominal pain, no shortness of breath.  Discussed with patient resuming all home medications.  Patient was provided a breathing treatment prior to discharging.  Patient to follow-up with her PCP for management of care.  All questions and concerns answered prior to being discharged.  Patient discharged home.

## 2023-10-13 NOTE — PROGRESS NOTES
Monitor summary per monitor tech report:    SB/SR, rate 53-64  Ectopy: rare PACs, rare PVCs  .14/.05/.37

## 2023-10-13 NOTE — H&P
IHelder M.D. performed a substantiated portion of the service face-to-face with same patient on the same date of service INDEPENDENTLY OF THE NURSE PRACTITIONER. I have seen and evaluated the patient at bedside. The care included reviewing related clinical data, obtaining relevant patient information, managing medical issues, patient counseling, and coordination of care, counseling regarding diagnosis, risk and benefits of various treatment plans, and expected outcomes. Available nursing, consultant, and other ancillary records were also reviewed.  I am actively involved in the patient's care. Patient's plan of care discussed at multidisplinary team rounds and with the patient.  I agree with the findings, assessment and plan of care as documented in the Nurse Practitioner's note and the information described below:    48-year-old female with past medical history of paroxysmal atrial fibrillation on Coumadin, COPD, restrictive lung disease who presents with cough, shortness of breath and upper respiratory tract congestion.  I suspect her symptoms are secondary to COPD exacerbation due to bronchitis and URI.  I will start the patient on prednisone, breathing treatments, inhalers and we will trend her cardiac enzymes for ACS rule out.        Hospital Medicine History & Physical Note    Date of Service  10/12/2023    Primary Care Physician  Heriberto Sharp M.D.      Code Status  Full Code    Chief Complaint  Chief Complaint   Patient presents with    Flu Like Symptoms     The pt reports body aches, fatigue, cough with phlegm production, sore throat, and decreased appetite. Symptoms started Saturday.       History of Presenting Illness  Lorene Haro is a 48 y.o. female past medical history of paroxysmal atrial fibrillation, chronic anticoagulation with warfarin, KATHIA, restrictive lung disease, undifferentiated schizophrenia, GERD who presented to the ED on 10/12/2023 with complaints of 4-day history sore  throat, myalgias, fatigue and chest pain.  Patient says that she began having a sore throat 4 days ago which has progressively gotten worse, hurts when she swallows or coughs. She reports chest pain, says that she feels someone is sitting on her chest, but pain is worse with deep breathing and cough.  She also reports persistent myalgias and fatigue. She went out to dinner last night and felt dizzy afterwards.   Chest x-ray obtained in the ED is negative for acute findings, shows some hypoinflation.  CBC shows WBC 12.1, ANC 9.1, otherwise unremarkable.  CMP is unremarkable.  Troponin T is mildly elevated 50.  EKG is normal sinus rhythm, 63 bpm, no acute ST-T changes. She was given ASA in the ED and admitted to the CDU.    I discussed the plan of care with patient.    Review of Systems  Review of Systems   Constitutional:  Positive for chills, fever and malaise/fatigue. Negative for diaphoresis and weight loss.   HENT:  Positive for sore throat. Negative for congestion and sinus pain.    Eyes: Negative.    Respiratory:  Positive for cough and shortness of breath. Negative for sputum production.    Cardiovascular:  Positive for chest pain. Negative for palpitations, orthopnea, leg swelling and PND.   Gastrointestinal:  Positive for nausea. Negative for abdominal pain, blood in stool, constipation, diarrhea, heartburn, melena and vomiting.   Genitourinary:  Negative for dysuria, frequency and urgency.   Musculoskeletal:  Positive for myalgias.   Skin:  Negative for itching and rash.   Psychiatric/Behavioral:  Negative for depression. The patient is not nervous/anxious.        Past Medical History   has a past medical history of A-fib ([facility]), MONI (acute kidney injury) ([facility]) (8/9/2023), Asthma, Bipolar 2 disorder ([facility]), Chickenpox, Chronic obstructive pulmonary disease ([facility]), Hypertension, On home oxygen therapy, Other psychological stress, Schizophrenic disorder ([facility]), and Yeast infection of the skin  (04/01/2021).    Surgical History   has a past surgical history that includes hysterectomy laparoscopy; colectomy; cholecystectomy; and knee arthroscopy (Left).     Family History  family history includes Cancer in her sister; No Known Problems in her mother.   Family history reviewed with patient. There is no family history that is pertinent to the chief complaint.     Social History   reports that she quit smoking about a year ago. Her smoking use included cigarettes. She has never used smokeless tobacco. She reports that she does not currently use alcohol. She reports that she does not use drugs.    Allergies  Allergies   Allergen Reactions    Penicillin G Rash and Itching     pt reports that she gets a rash all over her body and gets itchy    Amoxicillin Rash and Itching     Tolerates cephalosporins, pt reports that she gets a rash all over her body and gets itchy       Medications  Prior to Admission Medications   Prescriptions Last Dose Informant Patient Reported? Taking?   ADVAIR DISKUS 100-50 MCG/ACT AEROSOL POWDER, BREATH ACTIVATED   Yes No   albuterol 108 (90 Base) MCG/ACT Aero Soln inhalation aerosol   No No   Sig: Inhale 2 Puffs every 6 hours as needed for Shortness of Breath.   aripiprazole (ABILIFY) 30 MG tablet  Patient No No   Sig: Take 1 Tablet by mouth every morning. Indications: Major Depressive Disorder   aspirin 81 MG EC tablet   Yes No   Sig: Take 81 mg by mouth every day.   atorvastatin (LIPITOR) 20 MG Tab  Patient No No   Sig: Take 1 Tablet by mouth at bedtime.   famotidine (PEPCID) 20 MG Tab  Patient No No   Sig: Take 1 Tablet by mouth 2 times a day as needed (pain). For upper belly pain   fluticasone (FLONASE) 50 MCG/ACT nasal spray   Yes No   furosemide (LASIX) 20 MG Tab  Patient No No   Sig: Take 1 Tablet by mouth every day.   methocarbamol (ROBAXIN) 750 MG Tab  Patient No No   Sig: Take 2 Tablets by mouth 3 times a day.   metoprolol SR (TOPROL XL) 25 MG TABLET SR 24 HR  Patient Yes No    Sig: Take 25 mg by mouth every day.   montelukast (SINGULAIR) 10 MG Tab  Patient No No   Sig: Take 1 Tablet by mouth every evening.      Facility-Administered Medications: None       Physical Exam  Temp:  [35.6 °C (96 °F)-36.7 °C (98.1 °F)] 36.7 °C (98.1 °F)  Pulse:  [60-78] 60  Resp:  [16-18] 16  BP: (108-120)/(52-58) 109/58  SpO2:  [92 %-98 %] 98 %  Blood Pressure: 118/56   Temperature: (!) 35.6 °C (96 °F)   Pulse: 61   Respiration: 18   Pulse Oximetry: 92 %       Physical Exam  Vitals and nursing note reviewed.   Constitutional:       General: She is not in acute distress.     Appearance: Normal appearance. She is not ill-appearing.   HENT:      Head: Normocephalic and atraumatic.      Nose: Nose normal. No congestion or rhinorrhea.      Mouth/Throat:      Mouth: Mucous membranes are dry.      Pharynx: Oropharynx is clear.   Eyes:      Extraocular Movements: Extraocular movements intact.      Pupils: Pupils are equal, round, and reactive to light.   Cardiovascular:      Rate and Rhythm: Normal rate and regular rhythm.      Pulses: Normal pulses.      Heart sounds: No murmur heard.     No friction rub. No gallop.   Pulmonary:      Effort: Pulmonary effort is normal. No respiratory distress.      Breath sounds: Normal breath sounds. No wheezing, rhonchi or rales.   Abdominal:      General: There is no distension.      Palpations: Abdomen is soft.      Tenderness: There is no abdominal tenderness.   Musculoskeletal:         General: Normal range of motion.      Cervical back: Normal range of motion.      Right lower leg: No edema.      Left lower leg: No edema.   Skin:     General: Skin is warm and dry.      Capillary Refill: Capillary refill takes less than 2 seconds.      Findings: No lesion or rash.   Neurological:      General: No focal deficit present.      Mental Status: She is alert and oriented to person, place, and time.   Psychiatric:         Mood and Affect: Mood normal.         Laboratory:  Recent Labs      10/12/23  1702   WBC 12.1*   RBC 4.59   HEMOGLOBIN 12.1   HEMATOCRIT 39.4   MCV 85.8   MCH 26.4*   MCHC 30.7*   RDW 48.3   PLATELETCT 258   MPV 11.0     Recent Labs     10/12/23  1702   SODIUM 144   POTASSIUM 4.0   CHLORIDE 106   CO2 31   GLUCOSE 79   BUN 16   CREATININE 0.87   CALCIUM 9.6     Recent Labs     10/12/23  1702   ALTSGPT 14   ASTSGOT 12   ALKPHOSPHAT 143*   TBILIRUBIN 0.6   GLUCOSE 79           Recent Labs     10/12/23  1702   TROPONINT 50*       Imaging:  DX-CHEST-PORTABLE (1 VIEW)   Final Result      Hypoinflation without other evidence for acute cardiopulmonary disease.          EKG 10/12/23  Measurements   Intervals                                Axis   Rate:       63                           P:          53   NH:         136                          QRS:    60   QRSD:    106                          T:          14   QT:         408   QTc:        418   Interpretive Statements   Sinus rhythm   Compared to ECG 09/16/2023 22:59:09   Sinus bradycardia no longer present     Assessment/Plan:  * Acute chest pain- (present on admission)  Assessment & Plan  The 10-year ASCVD risk score (Ken DK, et al., 2019) is: 1.2%    Values used to calculate the score:      Age: 48 years      Sex: Female      Is Non- : No      Diabetic: No      Tobacco smoker: No      Systolic Blood Pressure: 118 mmHg      Is BP treated: No      HDL Cholesterol: 48 mg/dL      Total Cholesterol: 198 mg/dL  This appears to be more pleuritic secondary to respiratory illness and cough.  However, she is not hypoxic.  She underwent stress test 2 months ago which was negative for ischemia. Troponin is mildly elevated at 50, has been running 38-44 in the recent past.  EKG shows sinus rhythm, 63 bpm with no acute ST-T changes.   - Telemetry monitor  - Trend troponin overnight  - Will not repeat stress test due to negative stress test 2 months ago  - Analgesics prn    COPD with exacerbation (HCC)- (present on  admission)  Assessment & Plan  Mild exacerbation. Patient feels mildly short of breath, not hypoxic, + pleuritic chest pain, secondary to URI  - continue Advair daily  - Albuterol and duoneb q 6 hr prn  - IS  - Continue singular, flonase      Chronic diastolic heart failure (HCC)- (present on admission)  Assessment & Plan  Stable  - continue Lasix daily    Chronic hypoxic respiratory failure (HCC)- (present on admission)  Assessment & Plan  Uses O2 at HS  - continue supplemental oxygen to keep sats > 92%.    Paroxysmal atrial fibrillation (HCC)- (present on admission)  Assessment & Plan  Chronically on Coumadin.  EKG shows sinus rhythm, 63 bpm.  -Pharmacy to dose Coumadin  -Continue metoprolol    Leukocytosis- (present on admission)  Assessment & Plan  Possibly reactive. CXR negative for acute findings, shows hypoinflation. Procal normal  - UA   - monitor        Justification for Admission Status  I anticipate this patient is appropriate for observation status at this time because patient requires further monitoring for URI complaints with associated chest pain    Patient will need a Telemetry bed on MEDICAL service. The need is secondary to rule out ACS with trending troponins and monitoring rhythm overnight.    VTE prophylaxis: SCDs/TEDs and therapeutic anticoagulation with coumadin

## 2023-10-13 NOTE — ASSESSMENT & PLAN NOTE
Chronically on Coumadin.  EKG shows sinus rhythm, 63 bpm.  -Pharmacy to dose Coumadin  -Continue metoprolol

## 2023-10-13 NOTE — DISCHARGE SUMMARY
Discharge Summary    CHIEF COMPLAINT ON ADMISSION  Chief Complaint   Patient presents with    Flu Like Symptoms     The pt reports body aches, fatigue, cough with phlegm production, sore throat, and decreased appetite. Symptoms started Saturday.       Reason for Admission  EMS     Admission Date  10/12/2023    CODE STATUS  Full Code    HPI & HOSPITAL COURSE    Ms. Lorene Haro is a 48 y.o. female past medical history of paroxysmal atrial fibrillation, chronic anticoagulation with warfarin, KATHIA, restrictive lung disease, undifferentiated schizophrenia, GERD who presented to the ED on 10/12/2023 with complaints of 4-day history sore throat, myalgias, fatigue and chest pain.      Patient says that she began having a sore throat 4 days ago which has progressively gotten worse, hurts when she swallows or coughs. She reports chest pain, says that she feels someone is sitting on her chest, but pain is worse with deep breathing and cough.  She also reports persistent myalgias and fatigue. She went out to dinner last night and felt dizzy afterwards.   Chest x-ray obtained in the ED is negative for acute findings, shows some hypoinflation.  CBC shows WBC 12.1, ANC 9.1, otherwise unremarkable.  CMP is unremarkable.  Troponin T is mildly elevated 50.  EKG is normal sinus rhythm, 63 bpm, no acute ST-T changes. She was given ASA in the ED and admitted to the CDU.    Patient was monitored overnight with no events noted.  Cardiac stress test was deferred as patient denied any current chest pain, troponin trend plateau.  No infectious process was noted.  Patient likely having COPD exacerbation of which we will place patient on azithromycin and Medrol Dosepak to go home with.  Patient seen and examined prior to being discharged.  Patient denies any chest pain, no abdominal pain, no shortness of breath.  Discussed with patient resuming all home medications.  Patient was provided a breathing treatment prior to discharging.   Patient to follow-up with her PCP for management of care.  All questions and concerns answered prior to being discharged.  Patient discharged home.    Therefore, she is discharged in good and stable condition to home with close outpatient follow-up.    The patient recovered much more quickly than anticipated on admission.    Discharge Date  10/13/23      FOLLOW UP ITEMS POST DISCHARGE  Please call [phone] to schedule PCP appointment for patient.    Required specialty appointments include:       Discharge Instructions per CRISTOBAL Wolf    -Follow-up with PCP s/p hospitalization  -Continue to utilize inhalers and other medications at home  -Continue smoking cessation      DIET: As tolerated    ACTIVITY: As tolerated    DIAGNOSIS: Fatigue and malaise    Return to ER if symptoms persist, chest pain, palpitations, shortness of breath, numbness, tingling, weakness, and high fevers.    DISCHARGE DIAGNOSES  Principal Problem:    Acute chest pain (POA: Yes)  Active Problems:    Leukocytosis (POA: Yes)    Paroxysmal atrial fibrillation (HCC) (POA: Yes)    Chronic hypoxic respiratory failure (HCC) (POA: Yes)    Chronic diastolic heart failure (HCC) (POA: Yes)    COPD with exacerbation (HCC) (POA: Yes)  Resolved Problems:    * No resolved hospital problems. *      FOLLOW UP  Future Appointments   Date Time Provider Department Center   11/29/2023  3:45 PM SRAVANTHI Suarez None     Heriberto Sharp M.D.  [address] [facility] [id number]  [phone]    Schedule an appointment as soon as possible for a visit in 1 week(s)        MEDICATIONS ON DISCHARGE     Medication List        CONTINUE taking these medications        Instructions   Advair Diskus 100-50 MCG/ACT Aepb  Generic drug: fluticasone-salmeterol   Inhale 1 Puff 2 times a day.  Dose: 1 Puff     albuterol 108 (90 Base) MCG/ACT Aers inhalation aerosol   Inhale 2 Puffs every 6 hours as needed for Shortness of Breath.  Dose: 2 Puff      aripiprazole 30 MG tablet  Commonly known as: Abilify   Take 1 Tablet by mouth every morning. Indications: Major Depressive Disorder  Dose: 30 mg     aspirin 81 MG EC tablet   Take 81 mg by mouth every day.  Dose: 81 mg     atorvastatin 20 MG Tabs  Commonly known as: Lipitor   Take 1 Tablet by mouth at bedtime.  Dose: 20 mg     fluticasone 50 MCG/ACT nasal spray  Commonly known as: Flonase   Administer 2 Sprays into each nostril every morning.  Dose: 2 Spray     furosemide 20 MG Tabs  Commonly known as: Lasix   Take 1 Tablet by mouth every day.  Dose: 20 mg     montelukast 10 MG Tabs  Commonly known as: Singulair   Take 1 Tablet by mouth every evening.  Dose: 10 mg     NyQuil Severe Cold/Flu 5-6.25- MG/15ML Liqd  Generic drug: Phenyleph-Doxylamine-DM-APAP   Take 30 mL by mouth at bedtime as needed (Cough, Trouble Sleeping, Mild Pain or Fever).  Dose: 30 mL     potassium chloride 20 MEQ Pack  Commonly known as: Klor-Con   Take 20 mEq by mouth every day.  Dose: 20 mEq     Vicks DayQuil Cold & Flu 10-5-325 MG/15ML Liqd  Generic drug: DM-Phenylephrine-Acetaminophen   Take 30 mL by mouth every 6 hours as needed (Cough, Mild Pain or Fever).  Dose: 30 mL              Allergies  Allergies   Allergen Reactions    Penicillin G Rash and Itching     pt reports that she gets a rash all over her body and gets itchy    Amoxicillin Rash and Itching     Tolerates cephalosporins, pt reports that she gets a rash all over her body and gets itchy       DIET  Orders Placed This Encounter   Procedures    Diet Order Diet: Cardiac     Standing Status:   Standing     Number of Occurrences:   1     Order Specific Question:   Diet:     Answer:   Cardiac [6]       ACTIVITY  As tolerated.  Weight bearing as tolerated    CONSULTATIONS  NONE    PROCEDURES  NONE    IMAGING  DX-CHEST-PORTABLE (1 VIEW)   Final Result      Hypoinflation without other evidence for acute cardiopulmonary disease.            LABORATORY  Lab Results   Component  Value Date    SODIUM 144 10/12/2023    POTASSIUM 4.0 10/12/2023    CHLORIDE 106 10/12/2023    CO2 31 10/12/2023    GLUCOSE 79 10/12/2023    BUN 16 10/12/2023    CREATININE 0.87 10/12/2023    CREATININE 0.9 09/11/2008        Lab Results   Component Value Date    WBC 10.1 10/12/2023    HEMOGLOBIN 11.4 (L) 10/12/2023    HEMATOCRIT 37.4 10/12/2023    PLATELETCT 229 10/12/2023

## 2023-10-13 NOTE — PROGRESS NOTES
Received bedside report by night RN, assumed care of patient at 0700. Patient is drowsy, on 2L NC while sleeping - baseline. Denies chest pain at this time. Patient aware that updates will be completed after morning rounds. Chart check completed.

## 2023-10-13 NOTE — DISCHARGE INSTRUCTIONS
FOLLOW UP ITEMS POST DISCHARGE  Please call [phone] to schedule PCP appointment for patient.    Required specialty appointments include:       Discharge Instructions per LESTER WolfPOmarROmarN.    -Follow-up with PCP s/p hospitalization  -Continue to utilize inhalers and other medications at home  -Continue smoking cessation      DIET: As tolerated    ACTIVITY: As tolerated    DIAGNOSIS: Fatigue and malaise    Return to ER if symptoms persist, chest pain, palpitations, shortness of breath, numbness, tingling, weakness, and high fevers.

## 2023-10-13 NOTE — PROGRESS NOTES
APRN at bedside to assess patient and determine care of treatment. Plan to discharge home. Case management involved to help determine transport back to [facility] home. Patient on room air per baseline.

## 2023-10-13 NOTE — ED NOTES
Bedside report given to Kranthi GREENE. Pt resting in [facility] with stable vitals on RA

## 2023-10-13 NOTE — ASSESSMENT & PLAN NOTE
The 10-year ASCVD risk score (Ken BYRNES, et al., 2019) is: 1.2%    Values used to calculate the score:      Age: 48 years      Sex: Female      Is Non- : No      Diabetic: No      Tobacco smoker: No      Systolic Blood Pressure: 118 mmHg      Is BP treated: No      HDL Cholesterol: 48 mg/dL      Total Cholesterol: 198 mg/dL  This appears to be more pleuritic secondary to respiratory illness and cough.  However, she is not hypoxic.  She underwent stress test 2 months ago which was negative for ischemia. Troponin is mildly elevated at 50, has been running 38-44 in the recent past.  EKG shows sinus rhythm, 63 bpm with no acute ST-T changes.   - Telemetry monitor  - Trend troponin overnight  - Will not repeat stress test due to negative stress test 2 months ago  - Analgesics prn

## 2023-10-13 NOTE — ASSESSMENT & PLAN NOTE
Mild exacerbation. Patient feels mildly short of breath, not hypoxic, + pleuritic chest pain, secondary to URI  - continue Advair daily  - Albuterol and duoneb q 6 hr prn  - IS  - Continue singular, flonase

## 2023-10-13 NOTE — PROGRESS NOTES
D/c instructions given, educated on worsening s/s. Pt understands and questions answered. D/c back to group home via mtm

## 2023-10-13 NOTE — PROGRESS NOTES
Report received from ED RN. Plan of care explained. Whiteboard updated. Pt belongings and call light within reach. Bed is in low and locked position. Pt has no further needs at this time.

## 2023-10-13 NOTE — ED NOTES
Bedside report received from off going RN/tech: JC Puckett, assumed care of patient.  POC discussed with patient. Call light within reach, all needs addressed at this time.       Fall risk interventions in place: Move the patient closer to the nurse's station, Patient's personal possessions are with in their safe reach, Place socks on patient, Place fall risk sign on patient's door, and Keep floor surfaces clean and dry (all applicable per [facility] Fall risk assessment)   Continuous monitoring: Cardiac Leads, Pulse Ox, or Blood Pressure  IVF/IV medications: Not Applicable   Oxygen: Room Air  Bedside sitter: Not Applicable   Isolation: Not Applicable

## 2023-10-13 NOTE — CARE PLAN
The patient is Stable - Low risk of patient condition declining or worsening    Shift Goals  Clinical Goals: Pain control, cough control, tele, rest  Patient Goals: pain control, rest  Family Goals: ARAM      Problem: Knowledge Deficit - Standard  Goal: Patient and family/care givers will demonstrate understanding of plan of care, disease process/condition, diagnostic tests and medications  Outcome: Progressing     Problem: Pain - Standard  Goal: Alleviation of pain or a reduction in pain to the patient’s comfort goal  Outcome: Progressing

## 2023-10-13 NOTE — ASSESSMENT & PLAN NOTE
Possibly reactive. CXR negative for acute findings, shows hypoinflation. Procal normal  - UA   - monitor

## 2023-10-14 LAB
ANION GAP SERPL CALC-SCNC: 11 MMOL/L (ref 7–16)
BUN SERPL-MCNC: 18 MG/DL (ref 8–22)
CALCIUM SERPL-MCNC: 9 MG/DL (ref 8.5–10.5)
CHLORIDE SERPL-SCNC: 106 MMOL/L (ref 96–112)
CO2 SERPL-SCNC: 26 MMOL/L (ref 20–33)
CREAT SERPL-MCNC: 0.92 MG/DL (ref 0.5–1.4)
GFR SERPLBLD CREATININE-BSD FMLA CKD-EPI: 76 ML/MIN/1.73 M 2
GLUCOSE SERPL-MCNC: 90 MG/DL (ref 65–99)
POTASSIUM SERPL-SCNC: 4.3 MMOL/L (ref 3.6–5.5)
SODIUM SERPL-SCNC: 143 MMOL/L (ref 135–145)

## 2023-10-17 NOTE — ED TRIAGE NOTES
.  Chief Complaint   Patient presents with   • Digit Pain     pt states she bumped her thumb on the door walking into a gas station   • Arm Pain     states the pain radiates from her thumb up her arm.        Pt ambulatory to triage. Pt alert and oriented. Pt educated on triage process and to update staff if any changes. Pt placed back into lobby.     Patient is currently admitted to [facility] for UTI.    Called Yoanna MYERS with [facility], no answer, left detailed message from PCP and that patient is admitted to the hospital.    Please watch for discharge.

## 2023-10-19 ENCOUNTER — APPOINTMENT (OUTPATIENT)
Dept: RADIOLOGY | Facility: MEDICAL CENTER | Age: 48
End: 2023-10-19
Attending: EMERGENCY MEDICINE
Payer: MEDICAID

## 2023-10-19 ENCOUNTER — HOSPITAL ENCOUNTER (EMERGENCY)
Facility: MEDICAL CENTER | Age: 48
End: 2023-10-19
Attending: EMERGENCY MEDICINE
Payer: MEDICAID

## 2023-10-19 ENCOUNTER — TELEPHONE (OUTPATIENT)
Dept: CARDIOLOGY | Facility: MEDICAL CENTER | Age: 48
End: 2023-10-19
Payer: MEDICAID

## 2023-10-19 VITALS
TEMPERATURE: 97.7 F | DIASTOLIC BLOOD PRESSURE: 59 MMHG | BODY MASS INDEX: 49.49 KG/M2 | HEIGHT: 62 IN | HEART RATE: 54 BPM | SYSTOLIC BLOOD PRESSURE: 119 MMHG | WEIGHT: 268.96 LBS | RESPIRATION RATE: 17 BRPM | OXYGEN SATURATION: 95 %

## 2023-10-19 DIAGNOSIS — R07.9 CHEST PAIN, UNSPECIFIED TYPE: Primary | ICD-10-CM

## 2023-10-19 DIAGNOSIS — D72.829 LEUKOCYTOSIS, UNSPECIFIED TYPE: ICD-10-CM

## 2023-10-19 LAB
ALBUMIN SERPL BCP-MCNC: 4 G/DL (ref 3.2–4.9)
ALBUMIN/GLOB SERPL: 1.3 G/DL
ALP SERPL-CCNC: 137 U/L (ref 30–99)
ALT SERPL-CCNC: 14 U/L (ref 2–50)
ANION GAP SERPL CALC-SCNC: 7 MMOL/L (ref 7–16)
AST SERPL-CCNC: 12 U/L (ref 12–45)
BASOPHILS # BLD AUTO: 0.3 % (ref 0–1.8)
BASOPHILS # BLD: 0.04 K/UL (ref 0–0.12)
BILIRUB SERPL-MCNC: 0.4 MG/DL (ref 0.1–1.5)
BUN SERPL-MCNC: 26 MG/DL (ref 8–22)
CALCIUM ALBUM COR SERPL-MCNC: 9.9 MG/DL (ref 8.5–10.5)
CALCIUM SERPL-MCNC: 9.9 MG/DL (ref 8.5–10.5)
CHLORIDE SERPL-SCNC: 102 MMOL/L (ref 96–112)
CO2 SERPL-SCNC: 29 MMOL/L (ref 20–33)
CREAT SERPL-MCNC: 1.06 MG/DL (ref 0.5–1.4)
EKG IMPRESSION: NORMAL
EOSINOPHIL # BLD AUTO: 0.13 K/UL (ref 0–0.51)
EOSINOPHIL NFR BLD: 0.8 % (ref 0–6.9)
ERYTHROCYTE [DISTWIDTH] IN BLOOD BY AUTOMATED COUNT: 49.8 FL (ref 35.9–50)
GFR SERPLBLD CREATININE-BSD FMLA CKD-EPI: 64 ML/MIN/1.73 M 2
GLOBULIN SER CALC-MCNC: 3.2 G/DL (ref 1.9–3.5)
GLUCOSE SERPL-MCNC: 107 MG/DL (ref 65–99)
HCT VFR BLD AUTO: 43.2 % (ref 37–47)
HGB BLD-MCNC: 13 G/DL (ref 12–16)
IMM GRANULOCYTES # BLD AUTO: 0.06 K/UL (ref 0–0.11)
IMM GRANULOCYTES NFR BLD AUTO: 0.4 % (ref 0–0.9)
LYMPHOCYTES # BLD AUTO: 2.91 K/UL (ref 1–4.8)
LYMPHOCYTES NFR BLD: 18.2 % (ref 22–41)
MCH RBC QN AUTO: 25.8 PG (ref 27–33)
MCHC RBC AUTO-ENTMCNC: 30.1 G/DL (ref 32.2–35.5)
MCV RBC AUTO: 85.7 FL (ref 81.4–97.8)
MONOCYTES # BLD AUTO: 0.95 K/UL (ref 0–0.85)
MONOCYTES NFR BLD AUTO: 6 % (ref 0–13.4)
NEUTROPHILS # BLD AUTO: 11.87 K/UL (ref 1.82–7.42)
NEUTROPHILS NFR BLD: 74.3 % (ref 44–72)
NRBC # BLD AUTO: 0 K/UL
NRBC BLD-RTO: 0 /100 WBC (ref 0–0.2)
PLATELET # BLD AUTO: 299 K/UL (ref 164–446)
PMV BLD AUTO: 10.8 FL (ref 9–12.9)
POTASSIUM SERPL-SCNC: 4.4 MMOL/L (ref 3.6–5.5)
PROT SERPL-MCNC: 7.2 G/DL (ref 6–8.2)
RBC # BLD AUTO: 5.04 M/UL (ref 4.2–5.4)
SODIUM SERPL-SCNC: 138 MMOL/L (ref 135–145)
TROPONIN T SERPL-MCNC: 33 NG/L (ref 6–19)
TROPONIN T SERPL-MCNC: 39 NG/L (ref 6–19)
WBC # BLD AUTO: 16 K/UL (ref 4.8–10.8)

## 2023-10-19 PROCEDURE — 71045 X-RAY EXAM CHEST 1 VIEW: CPT

## 2023-10-19 PROCEDURE — 93005 ELECTROCARDIOGRAM TRACING: CPT | Performed by: EMERGENCY MEDICINE

## 2023-10-19 PROCEDURE — 99284 EMERGENCY DEPT VISIT MOD MDM: CPT

## 2023-10-19 PROCEDURE — 85025 COMPLETE CBC W/AUTO DIFF WBC: CPT

## 2023-10-19 PROCEDURE — 93005 ELECTROCARDIOGRAM TRACING: CPT

## 2023-10-19 PROCEDURE — 80053 COMPREHEN METABOLIC PANEL: CPT

## 2023-10-19 PROCEDURE — 36415 COLL VENOUS BLD VENIPUNCTURE: CPT

## 2023-10-19 PROCEDURE — 84484 ASSAY OF TROPONIN QUANT: CPT

## 2023-10-19 RX ORDER — ACETAMINOPHEN 500 MG
1000 TABLET ORAL EVERY 6 HOURS PRN
Qty: 20 TABLET | Refills: 0 | Status: SHIPPED | OUTPATIENT
Start: 2023-10-19 | End: 2023-10-23

## 2023-10-19 RX ORDER — IBUPROFEN 800 MG/1
800 TABLET ORAL EVERY 8 HOURS PRN
Qty: 9 TABLET | Refills: 0 | Status: SHIPPED | OUTPATIENT
Start: 2023-10-19 | End: 2023-10-22

## 2023-10-19 ASSESSMENT — FIBROSIS 4 INDEX: FIB4 SCORE: 0.7

## 2023-10-19 NOTE — ED TRIAGE NOTES
"Chief Complaint   Patient presents with    Chest Pain     Pt went to cardiologist just PTA thinking she had an appt but it was cancelled. She c/o dull chest pain x2 days, lightheadedness and feeling faint today.  Staff called 911 and sent her here.     Lightheadedness     Pt to triage for above after EKG. Protocol ordered.  From a [facility]. Caregiver was with her at cardiologist.     /67   Pulse 68   Temp 36.5 °C (97.7 °F) (Temporal)   Resp 16   Ht 1.575 m (5' 2\")   Wt 122 kg (268 lb 15.4 oz)   LMP  (LMP Unknown)   SpO2 97%   BMI 49.19 kg/m²     "

## 2023-10-19 NOTE — TELEPHONE ENCOUNTER
"Patient here for appointment with caregiver however appointment shows as cancelled. Patient c/o chest pain that is dull, lightheadedness and feeling faint. /48, HR 67, O2 93% on room air. Patient states is hurts and feels like pressure when she breathes in. Patient appears flushed and states she is feeling unwell.Patient states she has a h/o Afib. Patient taken to exam room. Patient reports chest pain is getting worse. ER recommended. CINDY called. Patient states she \"doesn't think she can make it out the door\". Patient also reports she has not been eating well. She reports headache and feeling like she is  \"going to start shaking\". Patient reports no history of seizures. CINDY arrived at  1441 and assumed care.    "

## 2023-10-20 NOTE — DISCHARGE INSTRUCTIONS
Please follow-up with your primary care provider for further evaluation.  Your work-up today was very reassuring.  If any worsening symptoms or concerns please return to the ED for further evaluation treatment.  Thank you for coming in today.

## 2023-10-20 NOTE — ED PROVIDER NOTES
ED Provider Note    Scribed for Nader Wilson by Chente Langford. 10/19/2023  5:21 PM    Primary care provider: Heriberto Sharp M.D.  Means of arrival: EMS  History obtained from: Patient  History limited by: None    CHIEF COMPLAINT  Chief Complaint   Patient presents with    Chest Pain     Pt went to cardiologist just PTA thinking she had an appt but it was cancelled. She c/o dull chest pain x2 days, lightheadedness and feeling faint today.  Staff called 911 and sent her here.     Lightheadedness       EXTERNAL RECORDS REVIEWED  Patient had a stress test performed two months ago, which was negative.     HPI/ROS  LIMITATION TO HISTORY   Select: : None  OUTSIDE HISTORIAN(S):  None.    HPI  Lorenesamina Haro is a 48 y.o. female who presents to the Emergency Department for evaluation of chest pain onset this morning. Patient reports that she believed that she had a cardiology appointment today, and upon arrival to the cardiologist she had found out that the appointment was cancelled. She was advised to report to the ED at cardiology today after she complained of chest pain, lightheadedness, and paleness. Patient has been experiencing associated dizziness, slight shortness of breath, and a dry cough. She denies any nausea, vomiting, diarrhea, constipation, or dysuria. Patient has a history of borderline diabetes, and is unsure of her blood pressure history. She takes Abilify, baby aspirin, and other medications daily, but she is unsure of what her other medications are at this time. Patient denies any tobacco, drug, or alcohol use.     REVIEW OF SYSTEMS  As above, all other systems reviewed and are negative.   See HPI for further details.     PAST MEDICAL HISTORY   has a past medical history of A-fib ([facility]), MONI (acute kidney injury) ([facility]) (8/9/2023), Asthma, Bipolar 2 disorder ([facility]), Chickenpox, Chronic obstructive pulmonary disease ([facility]), Hypertension, On home oxygen therapy, Other psychological stress,  Schizophrenic disorder (HCC), and Yeast infection of the skin (2021).  SURGICAL HISTORY   has a past surgical history that includes hysterectomy laparoscopy; colectomy; cholecystectomy; and knee arthroscopy (Left).  SOCIAL HISTORY  Social History     Tobacco Use    Smoking status: Former     Current packs/day: 0.00     Types: Cigarettes     Quit date: 10/12/2022     Years since quittin.0    Smokeless tobacco: Never   Vaping Use    Vaping Use: Never used   Substance Use Topics    Alcohol use: Not Currently    Drug use: Never      Social History     Substance and Sexual Activity   Drug Use Never     FAMILY HISTORY  Family History   Problem Relation Age of Onset    No Known Problems Mother     Cancer Sister      CURRENT MEDICATIONS  Home Medications       Reviewed by Louie France R.N. (Registered Nurse) on 10/19/23 at 1525  Med List Status: Partial     Medication Last Dose Status   ADVAIR DISKUS 100-50 MCG/ACT AEROSOL POWDER, BREATH ACTIVATED  Active   albuterol 108 (90 Base) MCG/ACT Aero Soln inhalation aerosol  Active   aripiprazole (ABILIFY) 30 MG tablet  Active   aspirin 81 MG EC tablet  Active   atorvastatin (LIPITOR) 20 MG Tab  Active   DM-Phenylephrine-Acetaminophen (VICKS DAYQUIL COLD & FLU) 10-5-325 MG/15ML Liquid  Active   fluticasone (FLONASE) 50 MCG/ACT nasal spray  Active   furosemide (LASIX) 20 MG Tab  Active   methylPREDNISolone (MEDROL DOSEPAK) 4 MG Tablet Therapy Pack  Active   montelukast (SINGULAIR) 10 MG Tab  Active   Phenyleph-Doxylamine-DM-APAP (NYQUIL SEVERE COLD/FLU) 5-6.25- MG/15ML Liquid  Active   potassium chloride (KLOR-CON) 20 MEQ Pack  Active                  ALLERGIES  Allergies   Allergen Reactions    Penicillin G Rash and Itching     pt reports that she gets a rash all over her body and gets itchy    Amoxicillin Rash and Itching     Tolerates cephalosporins, pt reports that she gets a rash all over her body and gets itchy       PHYSICAL EXAM    VITAL SIGNS:   Vitals:     "10/19/23 1512 10/19/23 1522   BP: 118/67    Pulse: 68    Resp: 16    Temp: 36.5 °C (97.7 °F)    TempSrc: Temporal    SpO2: 97%    Weight:  122 kg (268 lb 15.4 oz)   Height:  1.575 m (5' 2\")     Vitals: My interpretation: normotensive, not tachycardic, afebrile, not hypoxic    Reinterpretation of vitals: Unremarkable    Cardiac Monitor Interpretation: The cardiac monitor revealed normal Sinus Rhythm as interpreted by me. The cardiac monitor was ordered secondary to the patient's history of pain and to monitor for dysrhythmia and/or tachycardia.    PE:   Gen: sitting comfortably, speaking clearly, appears in no acute distress   ENT: Mucous membranes moist, posterior pharynx clear, uvula midline, nares patent bilaterally.  Neck: Supple, FROM  Pulmonary: Lungs are clear to auscultation bilaterally. No tachypnea  CV:  RRR, no murmur appreciated, pulses 2+ in both upper and lower extremities  Abdomen: soft, NT/ND; no rebound/guarding  : no CVA or suprapubic tenderness   Neuro: A&Ox4 (person, place, time, situation), speech fluent, gait steady, no focal deficits appreciated  Skin: No rash or lesions.  No pallor or jaundice.  No cyanosis.  Warm and dry.     DIAGNOSTIC STUDIES / PROCEDURES    LABS  Results for orders placed or performed during the hospital encounter of 10/19/23   CBC with Differential   Result Value Ref Range    WBC 16.0 (H) 4.8 - 10.8 K/uL    RBC 5.04 4.20 - 5.40 M/uL    Hemoglobin 13.0 12.0 - 16.0 g/dL    Hematocrit 43.2 37.0 - 47.0 %    MCV 85.7 81.4 - 97.8 fL    MCH 25.8 (L) 27.0 - 33.0 pg    MCHC 30.1 (L) 32.2 - 35.5 g/dL    RDW 49.8 35.9 - 50.0 fL    Platelet Count 299 164 - 446 K/uL    MPV 10.8 9.0 - 12.9 fL    Neutrophils-Polys 74.30 (H) 44.00 - 72.00 %    Lymphocytes 18.20 (L) 22.00 - 41.00 %    Monocytes 6.00 0.00 - 13.40 %    Eosinophils 0.80 0.00 - 6.90 %    Basophils 0.30 0.00 - 1.80 %    Immature Granulocytes 0.40 0.00 - 0.90 %    Nucleated RBC 0.00 0.00 - 0.20 /100 WBC    Neutrophils " (Absolute) 11.87 (H) 1.82 - 7.42 K/uL    Lymphs (Absolute) 2.91 1.00 - 4.80 K/uL    Monos (Absolute) 0.95 (H) 0.00 - 0.85 K/uL    Eos (Absolute) 0.13 0.00 - 0.51 K/uL    Baso (Absolute) 0.04 0.00 - 0.12 K/uL    Immature Granulocytes (abs) 0.06 0.00 - 0.11 K/uL    NRBC (Absolute) 0.00 K/uL   Complete Metabolic Panel (CMP)   Result Value Ref Range    Sodium 138 135 - 145 mmol/L    Potassium 4.4 3.6 - 5.5 mmol/L    Chloride 102 96 - 112 mmol/L    Co2 29 20 - 33 mmol/L    Anion Gap 7.0 7.0 - 16.0    Glucose 107 (H) 65 - 99 mg/dL    Bun 26 (H) 8 - 22 mg/dL    Creatinine 1.06 0.50 - 1.40 mg/dL    Calcium 9.9 8.5 - 10.5 mg/dL    Correct Calcium 9.9 8.5 - 10.5 mg/dL    AST(SGOT) 12 12 - 45 U/L    ALT(SGPT) 14 2 - 50 U/L    Alkaline Phosphatase 137 (H) 30 - 99 U/L    Total Bilirubin 0.4 0.1 - 1.5 mg/dL    Albumin 4.0 3.2 - 4.9 g/dL    Total Protein 7.2 6.0 - 8.2 g/dL    Globulin 3.2 1.9 - 3.5 g/dL    A-G Ratio 1.3 g/dL   Troponins NOW   Result Value Ref Range    Troponin T 39 (H) 6 - 19 ng/L   ESTIMATED GFR   Result Value Ref Range    GFR (CKD-EPI) 64 >60 mL/min/1.73 m 2   EKG   Result Value Ref Range    Report       [facility]    Test Date:  2023-10-19  Pt Name:    ADELINA MILLAN                Department: ER  MRN:        [id number]                      Room:  Gender:     Female                       Technician: [id number]  :        [id number]                   Requested By:ER TRIAGE PROTOCOL  Order #:    [id number]                    Reading MD: Nader Wilson    Measurements  Intervals                                Axis  Rate:       66                           P:          44  ID:         [id number]                          QRS:        35  QRSD:       107                          T:          3  QT:         381  QTc:        400    Interpretive Statements  Sinus rhythm  Ventricular premature complex  Borderline short ID interval  Low voltage, precordial leads  Borderline ST elevation, lateral  leads  Compared to ECG 10/12/2023 16:37:32  Ventricular premature complex(es) now present  Low QRS voltage now present  ST (T wave) deviation now present  Electronically Signed On 10 - 17:09:38 PDT by Nader Wilson        All labs reviewed by me. Labs were compared to prior labs if they were available. Significant for excess of 16, no anemia, normal electrolytes, normal glucose, normal renal function, normal liver enzymes, normal bilirubin, troponin 39 unchanged from prior    RADIOLOGY  I have independently interpreted the diagnostic imaging associated with this visit and am waiting the final reading from the radiologist.   My preliminary interpretation is a follows: Unchanged from prior no focal consolidative process, baseline borderline cardiomegaly  Radiologist interpretation is as follows:  DX-CHEST-PORTABLE (1 VIEW)   Final Result      1.  No acute cardiac or pulmonary abnormalities are identified.          COURSE & MEDICAL DECISION MAKING  Nursing notes, VS, PMSFHx, labs, imaging, EKG reviewed in chart.    Heart Score: 3    ED Observation Status? No; Patient does not meet criteria for ED Observation.     Ddx: Pneumonia, PE, MI, angina, costochondritis, pleurisy    MDM: 5:21 PM Lorene Haro is a 48 y.o. female who presented with with a history of schizophrenia, paroxysmal A-fib, COPD, bipolar disease, presenting for some dull chest discomfort and mild lightheadedness that she woke up with this morning.  She thought she did visit with her cardiologist and went to their office but was told she does not have an appointment, however when she describes some of her symptoms they were concerned and called the ambulance and she was brought here by EMS.  She denies any other concomitant symptoms such as nausea, vomiting, shortness of breath, GI or  symptoms.  Mild dry cough but otherwise unremarkable review of systems.  Her vital signs are completely normal upon arrival which is reassuring.  She had a  stat EKG done for complaint of chest pain which was compared to prior and showed no ischemic changes on my independent interpretation.  Underwent chest pain work-up here in the ED. All labs reviewed by me. Labs were compared to prior labs if they were available. Significant for leukocytosis of 16, no anemia, normal electrolytes, normal glucose, normal renal function, normal liver enzymes, normal bilirubin, troponin 39 unchanged from prior.  No indication for repeat troponin as symptoms began this morning and troponin is unremarkable compared to prior.  Unclear what is causing the patient's leukocytosis, possibly reactive in nature, she denies any urinary symptoms or vaginal discharge.  Heart score of 3 which is unchanged.  X-ray shows unchanged from prior, no focal consolidative process, baseline borderline cardiomegaly my independent interpretation, cardiologist agrees.  Overall patient feels reassured with negative work-up here in the ED and is essentially asymptomatic at time of discharge.  I did discuss that she should continue to follow-up with her outpatient team although she did have a negative stress test done just in the last 2 months.  Overall she is ambulatory, well-appearing in no acute distress, tolerating oral intake, has normal vital signs at the time of discharge.  She verbalized understanding strict return cautions outpatient follow plan and is amenable.  Tylenol and Motrin prescriptions were sent to the pharmacy to help with patient's symptomatology.    ADDITIONAL PROBLEM LIST AND DISPOSITION    I have discussed management of the patient with the following physicians and JAY's: None    Discussion of management with other QHP or appropriate source(s): None     Escalation of care considered, and ultimately not performed:acute inpatient care management, however at this time, the patient is most appropriate for outpatient management    Barriers to care at this time, including but not limited to:  Patient lacks transportation .     Decision tools and prescription drugs considered including, but not limited to: HEART Score   .    FINAL IMPRESSION  1. Chest pain, unspecified type Acute   2. Leukocytosis, unspecified type Acute       Chente HARRIS ([employer]), am scribing for, and in the presence of, Nader Wilson.    Electronically signed by: Chente Langford (Scribe), 10/19/2023    INader personally performed the services described in this documentation, as scribed by Chente Langford in my presence, and it is both accurate and complete.    The note accurately reflects work and decisions made by me.  Nader Wilson  10/19/2023  5:35 PM

## 2023-10-28 ENCOUNTER — NON-PROVIDER VISIT (OUTPATIENT)
Dept: CARDIOLOGY | Facility: MEDICAL CENTER | Age: 48
End: 2023-10-28
Payer: MEDICAID

## 2023-10-28 PROCEDURE — 93298 REM INTERROG DEV EVAL SCRMS: CPT | Performed by: INTERNAL MEDICINE

## 2023-10-30 NOTE — CARDIAC REMOTE MONITOR - SCAN
Device transmission reviewed. Device demonstrated appropriate function.       Electronically Signed by: Nader Barraza M.D.    11/8/2023  10:01 PM

## 2023-11-01 ENCOUNTER — HOSPITAL ENCOUNTER (OUTPATIENT)
Dept: LAB | Facility: MEDICAL CENTER | Age: 48
End: 2023-11-01
Attending: NURSE PRACTITIONER
Payer: MEDICAID

## 2023-11-01 ENCOUNTER — HOSPITAL ENCOUNTER (OUTPATIENT)
Dept: LAB | Facility: MEDICAL CENTER | Age: 48
End: 2023-11-01
Payer: MEDICAID

## 2023-11-01 DIAGNOSIS — D68.69 SECONDARY HYPERCOAGULABLE STATE (HCC): ICD-10-CM

## 2023-11-01 DIAGNOSIS — I48.0 PAROXYSMAL ATRIAL FIBRILLATION (HCC): ICD-10-CM

## 2023-11-01 LAB
ALBUMIN SERPL BCP-MCNC: 4.4 G/DL (ref 3.2–4.9)
ALBUMIN/GLOB SERPL: 1.4 G/DL
ALP SERPL-CCNC: 158 U/L (ref 30–99)
ALT SERPL-CCNC: 20 U/L (ref 2–50)
ANION GAP SERPL CALC-SCNC: 8 MMOL/L (ref 7–16)
ANION GAP SERPL CALC-SCNC: 9 MMOL/L (ref 7–16)
AST SERPL-CCNC: 15 U/L (ref 12–45)
BASOPHILS # BLD AUTO: 0.2 % (ref 0–1.8)
BASOPHILS # BLD: 0.02 K/UL (ref 0–0.12)
BILIRUB SERPL-MCNC: 1 MG/DL (ref 0.1–1.5)
BUN SERPL-MCNC: 13 MG/DL (ref 8–22)
BUN SERPL-MCNC: 13 MG/DL (ref 8–22)
CALCIUM ALBUM COR SERPL-MCNC: 9.5 MG/DL (ref 8.5–10.5)
CALCIUM SERPL-MCNC: 9.8 MG/DL (ref 8.5–10.5)
CALCIUM SERPL-MCNC: 9.8 MG/DL (ref 8.5–10.5)
CHLORIDE SERPL-SCNC: 105 MMOL/L (ref 96–112)
CHLORIDE SERPL-SCNC: 107 MMOL/L (ref 96–112)
CHOLEST SERPL-MCNC: 130 MG/DL (ref 100–199)
CO2 SERPL-SCNC: 27 MMOL/L (ref 20–33)
CO2 SERPL-SCNC: 29 MMOL/L (ref 20–33)
CREAT SERPL-MCNC: 0.81 MG/DL (ref 0.5–1.4)
CREAT SERPL-MCNC: 0.82 MG/DL (ref 0.5–1.4)
EOSINOPHIL # BLD AUTO: 0.08 K/UL (ref 0–0.51)
EOSINOPHIL NFR BLD: 0.7 % (ref 0–6.9)
ERYTHROCYTE [DISTWIDTH] IN BLOOD BY AUTOMATED COUNT: 50.3 FL (ref 35.9–50)
EST. AVERAGE GLUCOSE BLD GHB EST-MCNC: 120 MG/DL
FASTING STATUS PATIENT QL REPORTED: NORMAL
GFR SERPLBLD CREATININE-BSD FMLA CKD-EPI: 87 ML/MIN/1.73 M 2
GFR SERPLBLD CREATININE-BSD FMLA CKD-EPI: 88 ML/MIN/1.73 M 2
GLOBULIN SER CALC-MCNC: 3.1 G/DL (ref 1.9–3.5)
GLUCOSE SERPL-MCNC: 81 MG/DL (ref 65–99)
GLUCOSE SERPL-MCNC: 86 MG/DL (ref 65–99)
HBA1C MFR BLD: 5.8 % (ref 4–5.6)
HCT VFR BLD AUTO: 43.8 % (ref 37–47)
HDLC SERPL-MCNC: 44 MG/DL
HGB BLD-MCNC: 13.5 G/DL (ref 12–16)
IMM GRANULOCYTES # BLD AUTO: 0.03 K/UL (ref 0–0.11)
IMM GRANULOCYTES NFR BLD AUTO: 0.3 % (ref 0–0.9)
LDLC SERPL CALC-MCNC: 57 MG/DL
LYMPHOCYTES # BLD AUTO: 2.02 K/UL (ref 1–4.8)
LYMPHOCYTES NFR BLD: 18.8 % (ref 22–41)
MCH RBC QN AUTO: 26.7 PG (ref 27–33)
MCHC RBC AUTO-ENTMCNC: 30.8 G/DL (ref 32.2–35.5)
MCV RBC AUTO: 86.6 FL (ref 81.4–97.8)
MONOCYTES # BLD AUTO: 0.51 K/UL (ref 0–0.85)
MONOCYTES NFR BLD AUTO: 4.8 % (ref 0–13.4)
NEUTROPHILS # BLD AUTO: 8.07 K/UL (ref 1.82–7.42)
NEUTROPHILS NFR BLD: 75.2 % (ref 44–72)
NRBC # BLD AUTO: 0 K/UL
NRBC BLD-RTO: 0 /100 WBC (ref 0–0.2)
PLATELET # BLD AUTO: 260 K/UL (ref 164–446)
PMV BLD AUTO: 12.4 FL (ref 9–12.9)
POTASSIUM SERPL-SCNC: 4.4 MMOL/L (ref 3.6–5.5)
POTASSIUM SERPL-SCNC: 4.6 MMOL/L (ref 3.6–5.5)
PROT SERPL-MCNC: 7.5 G/DL (ref 6–8.2)
RBC # BLD AUTO: 5.06 M/UL (ref 4.2–5.4)
SODIUM SERPL-SCNC: 142 MMOL/L (ref 135–145)
SODIUM SERPL-SCNC: 143 MMOL/L (ref 135–145)
TRIGL SERPL-MCNC: 144 MG/DL (ref 0–149)
TSH SERPL DL<=0.005 MIU/L-ACNC: 0.72 UIU/ML (ref 0.38–5.33)
WBC # BLD AUTO: 10.7 K/UL (ref 4.8–10.8)

## 2023-11-01 PROCEDURE — 80061 LIPID PANEL: CPT

## 2023-11-01 PROCEDURE — 36415 COLL VENOUS BLD VENIPUNCTURE: CPT

## 2023-11-01 PROCEDURE — 84443 ASSAY THYROID STIM HORMONE: CPT

## 2023-11-01 PROCEDURE — 83036 HEMOGLOBIN GLYCOSYLATED A1C: CPT

## 2023-11-01 PROCEDURE — 85025 COMPLETE CBC W/AUTO DIFF WBC: CPT

## 2023-11-01 PROCEDURE — 80053 COMPREHEN METABOLIC PANEL: CPT

## 2023-11-01 PROCEDURE — 80048 BASIC METABOLIC PNL TOTAL CA: CPT

## 2023-11-01 PROCEDURE — 87522 HEPATITIS C REVRS TRNSCRPJ: CPT

## 2023-11-03 ENCOUNTER — HOSPITAL ENCOUNTER (INPATIENT)
Facility: MEDICAL CENTER | Age: 48
LOS: 1 days | DRG: 552 | End: 2023-11-05
Attending: STUDENT IN AN ORGANIZED HEALTH CARE EDUCATION/TRAINING PROGRAM | Admitting: STUDENT IN AN ORGANIZED HEALTH CARE EDUCATION/TRAINING PROGRAM
Payer: MEDICAID

## 2023-11-03 ENCOUNTER — APPOINTMENT (OUTPATIENT)
Dept: RADIOLOGY | Facility: MEDICAL CENTER | Age: 48
DRG: 552 | End: 2023-11-03
Attending: STUDENT IN AN ORGANIZED HEALTH CARE EDUCATION/TRAINING PROGRAM
Payer: MEDICAID

## 2023-11-03 DIAGNOSIS — R29.898 WEAKNESS OF RIGHT LOWER EXTREMITY: ICD-10-CM

## 2023-11-03 DIAGNOSIS — M48.061 SPINAL STENOSIS OF LUMBAR REGION WITHOUT NEUROGENIC CLAUDICATION: ICD-10-CM

## 2023-11-03 DIAGNOSIS — Z78.9 INABILITY TO PERFORM ACTIVITIES OF DAILY LIVING: ICD-10-CM

## 2023-11-03 DIAGNOSIS — M51.26 PROTRUSION OF LUMBAR INTERVERTEBRAL DISC: ICD-10-CM

## 2023-11-03 DIAGNOSIS — R55 NEAR SYNCOPE: ICD-10-CM

## 2023-11-03 DIAGNOSIS — R93.7 ABNORMAL CT SCAN, LUMBAR SPINE: ICD-10-CM

## 2023-11-03 LAB
ALBUMIN SERPL BCP-MCNC: 4.2 G/DL (ref 3.2–4.9)
ALBUMIN/GLOB SERPL: 1.5 G/DL
ALP SERPL-CCNC: 154 U/L (ref 30–99)
ALT SERPL-CCNC: 19 U/L (ref 2–50)
ANION GAP SERPL CALC-SCNC: 9 MMOL/L (ref 7–16)
AST SERPL-CCNC: 16 U/L (ref 12–45)
BASOPHILS # BLD AUTO: 0.2 % (ref 0–1.8)
BASOPHILS # BLD: 0.02 K/UL (ref 0–0.12)
BILIRUB SERPL-MCNC: 0.5 MG/DL (ref 0.1–1.5)
BUN SERPL-MCNC: 16 MG/DL (ref 8–22)
CALCIUM ALBUM COR SERPL-MCNC: 9.5 MG/DL (ref 8.5–10.5)
CALCIUM SERPL-MCNC: 9.7 MG/DL (ref 8.5–10.5)
CHLORIDE SERPL-SCNC: 104 MMOL/L (ref 96–112)
CO2 SERPL-SCNC: 29 MMOL/L (ref 20–33)
CREAT SERPL-MCNC: 0.92 MG/DL (ref 0.5–1.4)
EKG IMPRESSION: NORMAL
EOSINOPHIL # BLD AUTO: 0.12 K/UL (ref 0–0.51)
EOSINOPHIL NFR BLD: 1 % (ref 0–6.9)
ERYTHROCYTE [DISTWIDTH] IN BLOOD BY AUTOMATED COUNT: 50.4 FL (ref 35.9–50)
GFR SERPLBLD CREATININE-BSD FMLA CKD-EPI: 76 ML/MIN/1.73 M 2
GLOBULIN SER CALC-MCNC: 2.8 G/DL (ref 1.9–3.5)
GLUCOSE SERPL-MCNC: 116 MG/DL (ref 65–99)
HCT VFR BLD AUTO: 41.9 % (ref 37–47)
HCV RNA SERPL NAA+PROBE-ACNC: NOT DETECTED IU/ML
HCV RNA SERPL NAA+PROBE-LOG IU: NOT DETECTED LOG IU/ML
HCV RNA SERPL QL NAA+PROBE: NOT DETECTED
HGB BLD-MCNC: 12.8 G/DL (ref 12–16)
IMM GRANULOCYTES # BLD AUTO: 0.02 K/UL (ref 0–0.11)
IMM GRANULOCYTES NFR BLD AUTO: 0.2 % (ref 0–0.9)
INR PPP: 1.05 (ref 0.87–1.13)
LYMPHOCYTES # BLD AUTO: 2.44 K/UL (ref 1–4.8)
LYMPHOCYTES NFR BLD: 20.9 % (ref 22–41)
MCH RBC QN AUTO: 26.3 PG (ref 27–33)
MCHC RBC AUTO-ENTMCNC: 30.5 G/DL (ref 32.2–35.5)
MCV RBC AUTO: 86.2 FL (ref 81.4–97.8)
MONOCYTES # BLD AUTO: 0.59 K/UL (ref 0–0.85)
MONOCYTES NFR BLD AUTO: 5 % (ref 0–13.4)
NEUTROPHILS # BLD AUTO: 8.5 K/UL (ref 1.82–7.42)
NEUTROPHILS NFR BLD: 72.7 % (ref 44–72)
NRBC # BLD AUTO: 0 K/UL
NRBC BLD-RTO: 0 /100 WBC (ref 0–0.2)
NT-PROBNP SERPL IA-MCNC: 235 PG/ML (ref 0–125)
PLATELET # BLD AUTO: 240 K/UL (ref 164–446)
PMV BLD AUTO: 11.4 FL (ref 9–12.9)
POTASSIUM SERPL-SCNC: 4 MMOL/L (ref 3.6–5.5)
PROT SERPL-MCNC: 7 G/DL (ref 6–8.2)
PROTHROMBIN TIME: 13.9 SEC (ref 12–14.6)
RBC # BLD AUTO: 4.86 M/UL (ref 4.2–5.4)
SODIUM SERPL-SCNC: 142 MMOL/L (ref 135–145)
TROPONIN T SERPL-MCNC: 28 NG/L (ref 6–19)
WBC # BLD AUTO: 11.7 K/UL (ref 4.8–10.8)

## 2023-11-03 PROCEDURE — 99285 EMERGENCY DEPT VISIT HI MDM: CPT

## 2023-11-03 PROCEDURE — 700101 HCHG RX REV CODE 250: Mod: UD | Performed by: STUDENT IN AN ORGANIZED HEALTH CARE EDUCATION/TRAINING PROGRAM

## 2023-11-03 PROCEDURE — 80053 COMPREHEN METABOLIC PANEL: CPT

## 2023-11-03 PROCEDURE — 72131 CT LUMBAR SPINE W/O DYE: CPT

## 2023-11-03 PROCEDURE — 93005 ELECTROCARDIOGRAM TRACING: CPT | Performed by: STUDENT IN AN ORGANIZED HEALTH CARE EDUCATION/TRAINING PROGRAM

## 2023-11-03 PROCEDURE — 83880 ASSAY OF NATRIURETIC PEPTIDE: CPT

## 2023-11-03 PROCEDURE — 85025 COMPLETE CBC W/AUTO DIFF WBC: CPT

## 2023-11-03 PROCEDURE — 71045 X-RAY EXAM CHEST 1 VIEW: CPT

## 2023-11-03 PROCEDURE — 84484 ASSAY OF TROPONIN QUANT: CPT

## 2023-11-03 PROCEDURE — 85610 PROTHROMBIN TIME: CPT

## 2023-11-03 PROCEDURE — 36415 COLL VENOUS BLD VENIPUNCTURE: CPT

## 2023-11-03 PROCEDURE — 81003 URINALYSIS AUTO W/O SCOPE: CPT

## 2023-11-03 RX ORDER — LIDOCAINE 50 MG/G
1 PATCH TOPICAL EVERY 24 HOURS
Status: DISCONTINUED | OUTPATIENT
Start: 2023-11-03 | End: 2023-11-04

## 2023-11-03 RX ADMIN — LIDOCAINE 1 PATCH: 700 PATCH TOPICAL at 22:26

## 2023-11-03 ASSESSMENT — PAIN DESCRIPTION - PAIN TYPE: TYPE: ACUTE PAIN

## 2023-11-03 ASSESSMENT — FIBROSIS 4 INDEX: FIB4 SCORE: 0.65

## 2023-11-03 NOTE — ED NOTES
Rounded on patient. Patient resting in bed. No signs of distress noted. Equal chest rise and fall. No further needs at this time. Call light within reach.   VTE prophylaxis initially contraindicated secondary to elevated bleeding risk.  11/3 Prophylactic dose enoxaparin 30 mg BID initiated.    03-Jan-2023 08:39

## 2023-11-04 ENCOUNTER — APPOINTMENT (OUTPATIENT)
Dept: RADIOLOGY | Facility: MEDICAL CENTER | Age: 48
DRG: 552 | End: 2023-11-04
Attending: STUDENT IN AN ORGANIZED HEALTH CARE EDUCATION/TRAINING PROGRAM
Payer: MEDICAID

## 2023-11-04 PROBLEM — R55 PRE-SYNCOPE: Status: RESOLVED | Noted: 2023-11-04 | Resolved: 2023-11-04

## 2023-11-04 PROBLEM — M51.26 PROTRUSION OF LUMBAR INTERVERTEBRAL DISC: Status: ACTIVE | Noted: 2023-11-04

## 2023-11-04 PROBLEM — R55 PRE-SYNCOPE: Status: ACTIVE | Noted: 2023-11-04

## 2023-11-04 PROBLEM — M24.28 LIGAMENTUM FLAVUM HYPERTROPHY: Status: ACTIVE | Noted: 2023-11-04

## 2023-11-04 LAB
APPEARANCE UR: CLEAR
BILIRUB UR QL STRIP.AUTO: NEGATIVE
COLOR UR: YELLOW
GLUCOSE UR STRIP.AUTO-MCNC: NEGATIVE MG/DL
KETONES UR STRIP.AUTO-MCNC: NEGATIVE MG/DL
LEUKOCYTE ESTERASE UR QL STRIP.AUTO: NEGATIVE
MICRO URNS: NORMAL
NITRITE UR QL STRIP.AUTO: NEGATIVE
PH UR STRIP.AUTO: 6 [PH] (ref 5–8)
PROT UR QL STRIP: NEGATIVE MG/DL
RBC UR QL AUTO: NEGATIVE
SP GR UR STRIP.AUTO: 1.02
TROPONIN T SERPL-MCNC: 31 NG/L (ref 6–19)
UROBILINOGEN UR STRIP.AUTO-MCNC: 1 MG/DL

## 2023-11-04 PROCEDURE — A9270 NON-COVERED ITEM OR SERVICE: HCPCS | Performed by: GENERAL PRACTICE

## 2023-11-04 PROCEDURE — 84484 ASSAY OF TROPONIN QUANT: CPT

## 2023-11-04 PROCEDURE — 90471 IMMUNIZATION ADMIN: CPT

## 2023-11-04 PROCEDURE — 90686 IIV4 VACC NO PRSV 0.5 ML IM: CPT | Performed by: GENERAL PRACTICE

## 2023-11-04 PROCEDURE — 97535 SELF CARE MNGMENT TRAINING: CPT

## 2023-11-04 PROCEDURE — 700102 HCHG RX REV CODE 250 W/ 637 OVERRIDE(OP)

## 2023-11-04 PROCEDURE — 97161 PT EVAL LOW COMPLEX 20 MIN: CPT

## 2023-11-04 PROCEDURE — 72148 MRI LUMBAR SPINE W/O DYE: CPT

## 2023-11-04 PROCEDURE — 700102 HCHG RX REV CODE 250 W/ 637 OVERRIDE(OP): Performed by: STUDENT IN AN ORGANIZED HEALTH CARE EDUCATION/TRAINING PROGRAM

## 2023-11-04 PROCEDURE — 700111 HCHG RX REV CODE 636 W/ 250 OVERRIDE (IP): Performed by: STUDENT IN AN ORGANIZED HEALTH CARE EDUCATION/TRAINING PROGRAM

## 2023-11-04 PROCEDURE — A9270 NON-COVERED ITEM OR SERVICE: HCPCS | Performed by: STUDENT IN AN ORGANIZED HEALTH CARE EDUCATION/TRAINING PROGRAM

## 2023-11-04 PROCEDURE — 99223 1ST HOSP IP/OBS HIGH 75: CPT | Mod: AI | Performed by: STUDENT IN AN ORGANIZED HEALTH CARE EDUCATION/TRAINING PROGRAM

## 2023-11-04 PROCEDURE — A9270 NON-COVERED ITEM OR SERVICE: HCPCS

## 2023-11-04 PROCEDURE — 3E02340 INTRODUCTION OF INFLUENZA VACCINE INTO MUSCLE, PERCUTANEOUS APPROACH: ICD-10-PCS | Performed by: GENERAL PRACTICE

## 2023-11-04 PROCEDURE — 700111 HCHG RX REV CODE 636 W/ 250 OVERRIDE (IP): Performed by: GENERAL PRACTICE

## 2023-11-04 PROCEDURE — 770001 HCHG ROOM/CARE - MED/SURG/GYN PRIV*

## 2023-11-04 PROCEDURE — 36415 COLL VENOUS BLD VENIPUNCTURE: CPT

## 2023-11-04 PROCEDURE — 97166 OT EVAL MOD COMPLEX 45 MIN: CPT

## 2023-11-04 PROCEDURE — 700102 HCHG RX REV CODE 250 W/ 637 OVERRIDE(OP): Performed by: GENERAL PRACTICE

## 2023-11-04 RX ORDER — METHYLPREDNISOLONE 4 MG/1
4 TABLET ORAL
Status: DISCONTINUED | OUTPATIENT
Start: 2023-11-05 | End: 2023-11-05 | Stop reason: HOSPADM

## 2023-11-04 RX ORDER — POLYETHYLENE GLYCOL 3350 17 G/17G
1 POWDER, FOR SOLUTION ORAL
Status: DISCONTINUED | OUTPATIENT
Start: 2023-11-04 | End: 2023-11-05 | Stop reason: HOSPADM

## 2023-11-04 RX ORDER — ONDANSETRON 2 MG/ML
4 INJECTION INTRAMUSCULAR; INTRAVENOUS EVERY 4 HOURS PRN
Status: DISCONTINUED | OUTPATIENT
Start: 2023-11-04 | End: 2023-11-05 | Stop reason: HOSPADM

## 2023-11-04 RX ORDER — KETOROLAC TROMETHAMINE 30 MG/ML
15 INJECTION, SOLUTION INTRAMUSCULAR; INTRAVENOUS EVERY 6 HOURS PRN
Status: DISCONTINUED | OUTPATIENT
Start: 2023-11-04 | End: 2023-11-04

## 2023-11-04 RX ORDER — LIDOCAINE 50 MG/G
2 PATCH TOPICAL EVERY 24 HOURS
Status: DISCONTINUED | OUTPATIENT
Start: 2023-11-04 | End: 2023-11-05 | Stop reason: HOSPADM

## 2023-11-04 RX ORDER — OXYCODONE HYDROCHLORIDE AND ACETAMINOPHEN 5; 325 MG/1; MG/1
1 TABLET ORAL EVERY 6 HOURS PRN
Status: DISCONTINUED | OUTPATIENT
Start: 2023-11-04 | End: 2023-11-05 | Stop reason: HOSPADM

## 2023-11-04 RX ORDER — PROCHLORPERAZINE EDISYLATE 5 MG/ML
10 INJECTION INTRAMUSCULAR; INTRAVENOUS EVERY 6 HOURS PRN
Status: DISCONTINUED | OUTPATIENT
Start: 2023-11-04 | End: 2023-11-05 | Stop reason: HOSPADM

## 2023-11-04 RX ORDER — BISACODYL 10 MG
10 SUPPOSITORY, RECTAL RECTAL
Status: DISCONTINUED | OUTPATIENT
Start: 2023-11-04 | End: 2023-11-05 | Stop reason: HOSPADM

## 2023-11-04 RX ORDER — HEPARIN SODIUM 5000 [USP'U]/ML
5000 INJECTION, SOLUTION INTRAVENOUS; SUBCUTANEOUS EVERY 8 HOURS
Status: DISCONTINUED | OUTPATIENT
Start: 2023-11-04 | End: 2023-11-05 | Stop reason: HOSPADM

## 2023-11-04 RX ORDER — ASPIRIN 81 MG/1
81 TABLET ORAL DAILY
Status: DISCONTINUED | OUTPATIENT
Start: 2023-11-04 | End: 2023-11-04

## 2023-11-04 RX ORDER — METHYLPREDNISOLONE 4 MG/1
4 TABLET ORAL
Status: DISCONTINUED | OUTPATIENT
Start: 2023-11-07 | End: 2023-11-05 | Stop reason: HOSPADM

## 2023-11-04 RX ORDER — ACETAMINOPHEN 325 MG/1
650 TABLET ORAL EVERY 6 HOURS PRN
Status: DISCONTINUED | OUTPATIENT
Start: 2023-11-04 | End: 2023-11-05 | Stop reason: HOSPADM

## 2023-11-04 RX ORDER — METHOCARBAMOL 500 MG/1
500 TABLET, FILM COATED ORAL 4 TIMES DAILY
Status: DISCONTINUED | OUTPATIENT
Start: 2023-11-04 | End: 2023-11-05 | Stop reason: HOSPADM

## 2023-11-04 RX ORDER — NYSTATIN 100000 [USP'U]/G
POWDER TOPICAL 2 TIMES DAILY
Status: DISCONTINUED | OUTPATIENT
Start: 2023-11-04 | End: 2023-11-05 | Stop reason: HOSPADM

## 2023-11-04 RX ORDER — ARIPIPRAZOLE 15 MG/1
30 TABLET ORAL EVERY MORNING
Status: DISCONTINUED | OUTPATIENT
Start: 2023-11-04 | End: 2023-11-05 | Stop reason: HOSPADM

## 2023-11-04 RX ORDER — FUROSEMIDE 20 MG/1
20 TABLET ORAL DAILY
Status: DISCONTINUED | OUTPATIENT
Start: 2023-11-04 | End: 2023-11-05 | Stop reason: HOSPADM

## 2023-11-04 RX ORDER — FLUTICASONE PROPIONATE 50 MCG
2 SPRAY, SUSPENSION (ML) NASAL EVERY MORNING
Status: DISCONTINUED | OUTPATIENT
Start: 2023-11-04 | End: 2023-11-04

## 2023-11-04 RX ORDER — GABAPENTIN 100 MG/1
100 CAPSULE ORAL 3 TIMES DAILY
Status: DISCONTINUED | OUTPATIENT
Start: 2023-11-04 | End: 2023-11-05 | Stop reason: HOSPADM

## 2023-11-04 RX ORDER — IPRATROPIUM BROMIDE AND ALBUTEROL SULFATE 2.5; .5 MG/3ML; MG/3ML
3 SOLUTION RESPIRATORY (INHALATION) EVERY 4 HOURS PRN
Status: DISCONTINUED | OUTPATIENT
Start: 2023-11-04 | End: 2023-11-05 | Stop reason: HOSPADM

## 2023-11-04 RX ORDER — ATORVASTATIN CALCIUM 20 MG/1
20 TABLET, FILM COATED ORAL
Status: DISCONTINUED | OUTPATIENT
Start: 2023-11-04 | End: 2023-11-05 | Stop reason: HOSPADM

## 2023-11-04 RX ORDER — METHYLPREDNISOLONE 4 MG/1
4 TABLET ORAL 2 TIMES DAILY WITH MEALS
Status: DISCONTINUED | OUTPATIENT
Start: 2023-11-08 | End: 2023-11-05 | Stop reason: HOSPADM

## 2023-11-04 RX ORDER — METHYLPREDNISOLONE 4 MG/1
8 TABLET ORAL
Status: DISCONTINUED | OUTPATIENT
Start: 2023-11-05 | End: 2023-11-05 | Stop reason: HOSPADM

## 2023-11-04 RX ORDER — TRAMADOL HYDROCHLORIDE 50 MG/1
50 TABLET ORAL EVERY 6 HOURS PRN
Status: DISCONTINUED | OUTPATIENT
Start: 2023-11-04 | End: 2023-11-05 | Stop reason: HOSPADM

## 2023-11-04 RX ORDER — MONTELUKAST SODIUM 10 MG/1
10 TABLET ORAL EVERY EVENING
Status: DISCONTINUED | OUTPATIENT
Start: 2023-11-04 | End: 2023-11-05 | Stop reason: HOSPADM

## 2023-11-04 RX ORDER — FLUTICASONE PROPIONATE AND SALMETEROL 100; 50 UG/1; UG/1
1 POWDER RESPIRATORY (INHALATION) 2 TIMES DAILY
Status: DISCONTINUED | OUTPATIENT
Start: 2023-11-04 | End: 2023-11-04

## 2023-11-04 RX ORDER — METHYLPREDNISOLONE 4 MG/1
4 TABLET ORAL
Status: DISCONTINUED | OUTPATIENT
Start: 2023-11-06 | End: 2023-11-05 | Stop reason: HOSPADM

## 2023-11-04 RX ORDER — AMOXICILLIN 250 MG
2 CAPSULE ORAL 2 TIMES DAILY
Status: DISCONTINUED | OUTPATIENT
Start: 2023-11-04 | End: 2023-11-05 | Stop reason: HOSPADM

## 2023-11-04 RX ORDER — ALBUTEROL SULFATE 90 UG/1
2 AEROSOL, METERED RESPIRATORY (INHALATION)
Status: DISCONTINUED | OUTPATIENT
Start: 2023-11-04 | End: 2023-11-05 | Stop reason: HOSPADM

## 2023-11-04 RX ORDER — KETOROLAC TROMETHAMINE 30 MG/ML
15 INJECTION, SOLUTION INTRAMUSCULAR; INTRAVENOUS EVERY 6 HOURS PRN
Status: DISCONTINUED | OUTPATIENT
Start: 2023-11-04 | End: 2023-11-05 | Stop reason: HOSPADM

## 2023-11-04 RX ORDER — IPRATROPIUM BROMIDE AND ALBUTEROL SULFATE 2.5; .5 MG/3ML; MG/3ML
3 SOLUTION RESPIRATORY (INHALATION)
Status: DISCONTINUED | OUTPATIENT
Start: 2023-11-04 | End: 2023-11-04

## 2023-11-04 RX ADMIN — FUROSEMIDE 20 MG: 20 TABLET ORAL at 09:58

## 2023-11-04 RX ADMIN — OXYCODONE AND ACETAMINOPHEN 1 TABLET: 5; 325 TABLET ORAL at 23:35

## 2023-11-04 RX ADMIN — METHYLPREDNISOLONE 24 MG: 16 TABLET ORAL at 11:22

## 2023-11-04 RX ADMIN — KETOROLAC TROMETHAMINE 15 MG: 30 INJECTION INTRAMUSCULAR; INTRAVENOUS at 04:44

## 2023-11-04 RX ADMIN — HEPARIN SODIUM 5000 UNITS: 5000 INJECTION, SOLUTION INTRAVENOUS; SUBCUTANEOUS at 14:07

## 2023-11-04 RX ADMIN — MONTELUKAST 10 MG: 10 TABLET, FILM COATED ORAL at 18:02

## 2023-11-04 RX ADMIN — DOCUSATE SODIUM 50 MG AND SENNOSIDES 8.6 MG 2 TABLET: 8.6; 5 TABLET, FILM COATED ORAL at 18:02

## 2023-11-04 RX ADMIN — NYSTATIN: 100000 POWDER TOPICAL at 06:37

## 2023-11-04 RX ADMIN — INFLUENZA A VIRUS A/VICTORIA/4897/2022 IVR-238 (H1N1) ANTIGEN (FORMALDEHYDE INACTIVATED), INFLUENZA A VIRUS A/DARWIN/9/2021 SAN-010 (H3N2) ANTIGEN (FORMALDEHYDE INACTIVATED), INFLUENZA B VIRUS B/PHUKET/3073/2013 ANTIGEN (FORMALDEHYDE INACTIVATED), AND INFLUENZA B VIRUS B/MICHIGAN/01/2021 ANTIGEN (FORMALDEHYDE INACTIVATED) 0.5 ML: 15; 15; 15; 15 INJECTION, SUSPENSION INTRAMUSCULAR at 18:03

## 2023-11-04 RX ADMIN — OXYCODONE AND ACETAMINOPHEN 1 TABLET: 5; 325 TABLET ORAL at 10:03

## 2023-11-04 RX ADMIN — METHOCARBAMOL 500 MG: 500 TABLET ORAL at 14:07

## 2023-11-04 RX ADMIN — GABAPENTIN 100 MG: 100 CAPSULE ORAL at 09:58

## 2023-11-04 RX ADMIN — HEPARIN SODIUM 5000 UNITS: 5000 INJECTION, SOLUTION INTRAVENOUS; SUBCUTANEOUS at 22:14

## 2023-11-04 RX ADMIN — METHOCARBAMOL 500 MG: 500 TABLET ORAL at 22:15

## 2023-11-04 RX ADMIN — METHOCARBAMOL 500 MG: 500 TABLET ORAL at 18:02

## 2023-11-04 RX ADMIN — ARIPIPRAZOLE 30 MG: 15 TABLET ORAL at 06:36

## 2023-11-04 RX ADMIN — ATORVASTATIN CALCIUM 20 MG: 20 TABLET, FILM COATED ORAL at 20:49

## 2023-11-04 RX ADMIN — OXYCODONE AND ACETAMINOPHEN 1 TABLET: 5; 325 TABLET ORAL at 16:03

## 2023-11-04 RX ADMIN — GABAPENTIN 100 MG: 100 CAPSULE ORAL at 14:06

## 2023-11-04 RX ADMIN — GABAPENTIN 100 MG: 100 CAPSULE ORAL at 20:49

## 2023-11-04 RX ADMIN — ASPIRIN 81 MG: 81 TABLET, COATED ORAL at 06:35

## 2023-11-04 RX ADMIN — NYSTATIN: 100000 POWDER TOPICAL at 18:08

## 2023-11-04 RX ADMIN — METHOCARBAMOL 500 MG: 500 TABLET ORAL at 10:28

## 2023-11-04 RX ADMIN — MOMETASONE FUROATE AND FORMOTEROL FUMARATE DIHYDRATE 2 PUFF: 100; 5 AEROSOL RESPIRATORY (INHALATION) at 18:16

## 2023-11-04 ASSESSMENT — GAIT ASSESSMENTS
ASSISTIVE DEVICE: FRONT WHEEL WALKER
GAIT LEVEL OF ASSIST: SUPERVISED
DISTANCE (FEET): 400

## 2023-11-04 ASSESSMENT — COGNITIVE AND FUNCTIONAL STATUS - GENERAL
DAILY ACTIVITIY SCORE: 21
SUGGESTED CMS G CODE MODIFIER DAILY ACTIVITY: CJ
DAILY ACTIVITIY SCORE: 20
SUGGESTED CMS G CODE MODIFIER MOBILITY: CJ
SUGGESTED CMS G CODE MODIFIER DAILY ACTIVITY: CJ
CLIMB 3 TO 5 STEPS WITH RAILING: A LITTLE
WALKING IN HOSPITAL ROOM: A LITTLE
WALKING IN HOSPITAL ROOM: A LITTLE
EATING MEALS: A LOT
TURNING FROM BACK TO SIDE WHILE IN FLAT BAD: A LITTLE
HELP NEEDED FOR BATHING: A LITTLE
HELP NEEDED FOR BATHING: A LITTLE
TURNING FROM BACK TO SIDE WHILE IN FLAT BAD: A LITTLE
CLIMB 3 TO 5 STEPS WITH RAILING: A LOT
MOBILITY SCORE: 20
TOILETING: A LITTLE
SUGGESTED CMS G CODE MODIFIER MOBILITY: CJ
MOBILITY SCORE: 20
DRESSING REGULAR LOWER BODY CLOTHING: A LITTLE
TOILETING: A LITTLE
MOVING TO AND FROM BED TO CHAIR: A LITTLE

## 2023-11-04 ASSESSMENT — LIFESTYLE VARIABLES
ON A TYPICAL DAY WHEN YOU DRINK ALCOHOL HOW MANY DRINKS DO YOU HAVE: 0
TOTAL SCORE: 0
TOTAL SCORE: 0
HOW MANY TIMES IN THE PAST YEAR HAVE YOU HAD 5 OR MORE DRINKS IN A DAY: 0
HAVE YOU EVER FELT YOU SHOULD CUT DOWN ON YOUR DRINKING: NO
DOES PATIENT WANT TO STOP DRINKING: NO
EVER FELT BAD OR GUILTY ABOUT YOUR DRINKING: NO
ALCOHOL_USE: NO
AVERAGE NUMBER OF DAYS PER WEEK YOU HAVE A DRINK CONTAINING ALCOHOL: 0
EVER HAD A DRINK FIRST THING IN THE MORNING TO STEADY YOUR NERVES TO GET RID OF A HANGOVER: NO
TOTAL SCORE: 0
HAVE PEOPLE ANNOYED YOU BY CRITICIZING YOUR DRINKING: NO
CONSUMPTION TOTAL: NEGATIVE

## 2023-11-04 ASSESSMENT — ENCOUNTER SYMPTOMS
NEUROLOGICAL NEGATIVE: 1
BACK PAIN: 1
RESPIRATORY NEGATIVE: 1
CARDIOVASCULAR NEGATIVE: 1
EYES NEGATIVE: 1
PSYCHIATRIC NEGATIVE: 1
GASTROINTESTINAL NEGATIVE: 1

## 2023-11-04 ASSESSMENT — PATIENT HEALTH QUESTIONNAIRE - PHQ9
9. THOUGHTS THAT YOU WOULD BE BETTER OFF DEAD, OR OF HURTING YOURSELF: NOT AT ALL
4. FEELING TIRED OR HAVING LITTLE ENERGY: MORE THAN HALF THE DAYS
6. FEELING BAD ABOUT YOURSELF - OR THAT YOU ARE A FAILURE OR HAVE LET YOURSELF OR YOUR FAMILY DOWN: SEVERAL DAYS
SUM OF ALL RESPONSES TO PHQ9 QUESTIONS 1 AND 2: 1
2. FEELING DOWN, DEPRESSED, IRRITABLE, OR HOPELESS: SEVERAL DAYS
SUM OF ALL RESPONSES TO PHQ QUESTIONS 1-9: 8
5. POOR APPETITE OR OVEREATING: MORE THAN HALF THE DAYS
7. TROUBLE CONCENTRATING ON THINGS, SUCH AS READING THE NEWSPAPER OR WATCHING TELEVISION: SEVERAL DAYS
3. TROUBLE FALLING OR STAYING ASLEEP OR SLEEPING TOO MUCH: SEVERAL DAYS
8. MOVING OR SPEAKING SO SLOWLY THAT OTHER PEOPLE COULD HAVE NOTICED. OR THE OPPOSITE, BEING SO FIGETY OR RESTLESS THAT YOU HAVE BEEN MOVING AROUND A LOT MORE THAN USUAL: NOT AT ALL
1. LITTLE INTEREST OR PLEASURE IN DOING THINGS: NOT AT ALL

## 2023-11-04 ASSESSMENT — ACTIVITIES OF DAILY LIVING (ADL): TOILETING: INDEPENDENT

## 2023-11-04 ASSESSMENT — PAIN DESCRIPTION - PAIN TYPE: TYPE: ACUTE PAIN

## 2023-11-04 NOTE — ED TRIAGE NOTES
"Chief Complaint   Patient presents with    Low Back Pain     X 1 day. Pt states she almost fell yesterday morning and since has been having low back pain.        49 yo F to triage for above complaint.      Pt placed in lobby. Pt educated on triage process. Pt encouraged to alert staff for any changes.     Patient and staff wearing appropriate PPE    /68   Pulse 75   Temp 36.6 °C (97.8 °F) (Temporal)   Resp 16   Ht 1.575 m (5' 2\")   Wt 122 kg (268 lb 15.4 oz)   LMP  (LMP Unknown)   SpO2 98%   BMI 49.19 kg/m²     "

## 2023-11-04 NOTE — THERAPY
Physical Therapy   Initial Evaluation     Patient Name: Lorene Haro  Age:  48 y.o., Sex:  female  Medical Record #: [id number]  Today's Date: 11/4/2023    Assessment  Patient is a 48 y.o. female admitted with c/o back pain, imaging showing L4-5 small disc protrusion, conservative management. Pt seen for PT evaluation at this time. Pt appears at her functional baseline, ambulated with FWW, no LOB. Reports no concerns with return to group home and appears functionally capable of DC. No further acute PT needs.     Plan  Evaluation only  DC Equipment Recommendations: None  Discharge Recommendations: Anticipate that the patient will have no further physical therapy needs after discharge from the hospital        11/04/23 1515   Prior Living Situation   Housing / Facility Group Home   Steps Into Home 0   Steps In Home 0   Bathroom Set up Walk In Shower;Grab Bars;Shower Chair   Equipment Owned 4-Wheel Walker;Tub / Shower Seat   Comments lives in group home, indep with mobility   Prior Level of Functional Mobility   Bed Mobility Independent   Transfer Status Independent   Ambulation Independent   Ambulation Distance community distances   Assistive Devices Used 4-Wheel Walker   Cognition    Comments pleasant and eager to participate   Balance Assessment   Sitting Balance (Static) Good   Sitting Balance (Dynamic) Fair +   Standing Balance (Static) Fair +   Standing Balance (Dynamic) Fair   Weight Shift Sitting Good   Weight Shift Standing Fair   Comments standing with FWW   Bed Mobility    Comments up pre/post   Gait Analysis   Gait Level Of Assist Supervised   Assistive Device Front Wheel Walker   Distance (Feet) 400   Weight Bearing Status no restrictions   Comments no LOB, able to step on/off scale, c/o some R foot pain   Functional Mobility   Sit to Stand Supervised

## 2023-11-04 NOTE — ASSESSMENT & PLAN NOTE
CT imaging noted small central posterior disc protrusion at the level of L4-L5 with ligamentum flavum hypertrophy, mild central canal stenosis and neuroforaminal narrowing.     MRI imaging small disc bulge, no indication for surgery.  Patient started on Medrol Dosepak and muscle relaxants.  She is to follow-up with neurosurgery outpatient.  Multimodal pain control with gabapentin, Lidoderm patch, muscle relaxers and as needed oral and IV opioids -monitor for any signs of respiratory depression

## 2023-11-04 NOTE — ASSESSMENT & PLAN NOTE
CT imaging noted small central posterior disc protrusion at the level of L4-L5 with ligamentum flavum hypertrophy, mild central canal stenosis and neuroforaminal narrowing.     MRI imaging small disc bulge, no indication for surgery.  Patient started on Medrol Dosepak and muscle relaxants.  She is to follow-up with neurosurgery outpatient.  Multimodal pain control with gabapentin, Lidoderm patch, muscle relaxers and as needed oral and IV opioids -monitor for any signs of respiratory depression  PT/OT

## 2023-11-04 NOTE — PROGRESS NOTES
Received care of pt from NOC RN this am. In room with Dr. Mims today. Discussed pt's care with Dr. Mims.

## 2023-11-04 NOTE — ED NOTES
Pt sleeping in gurney, chest rise noted. Pt attached to monitor, VSS. Call light in reach, gurney in lowest position.

## 2023-11-04 NOTE — CARE PLAN
The patient is Stable - Low risk of patient condition declining or worsening    Shift Goals  Clinical Goals: pain control, mobilize  Patient Goals: pain control, mobilize  Family Goals: Not present    Progress made toward(s) clinical / shift goals:  pt mobilized, up to chair today, up to ambulate, receives pain medications, see doc flowsheets. Saw PT/OT today. Discussed pt's care with Hospitalist.         Problem: Pain - Standard  Goal: Alleviation of pain or a reduction in pain to the patient’s comfort goal  Outcome: Progressing  Note: Receives pain meds, see mar     Problem: Knowledge Deficit - Standard  Goal: Patient and family/care givers will demonstrate understanding of plan of care, disease process/condition, diagnostic tests and medications  Outcome: Progressing  Note: Discussed plan of care for today w/ pt this am, she is A+Ox4, she verbalizes understanding of her plan of care, no questions. Emotional support given. Reinforcing education as necessary.

## 2023-11-04 NOTE — PROGRESS NOTES
4 Eyes Skin Assessment Completed by JC Farrell and JC Stoddard.    Head WDL  Ears Redness and Blanching  Nose WDL  Mouth WDL  Neck WDL  Breast/Chest Redness, Blanching, and Rash, yeast infection   Shoulder Blades WDL  Spine WDL  (R) Arm/Elbow/Hand Bruising, Scab, and Scar  (L) Arm/Elbow/Hand Bruising, Scab, and Scar  Abdomen Redness, Blanching, and Rash, yeast infection   Groin Redness, Blanching, and Rash, yeast infection   Scrotum/Coccyx/Buttocks Redness and Blanching  (R) Leg Blanching, Scar, and Bruising  (L) Leg Blanching, Scar, and Bruising  (R) Heel/Foot/Toe Redness and Blanching  (L) Heel/Foot/Toe Redness and Blanching          Devices In Places Blood Pressure Cuff, Pulse Ox, SCD's, and Nasal Cannula      Interventions In Place Gray Ear Foams, Waffle Overlay, Pillows, Barrier Cream, Dri-Everett Pads, and Pressure Redistribution Mattress, nystatin powder and inter-dry     Possible Skin Injury Yes    Pictures Uploaded Into Epic Yes  Wound Consult Placed Yes  RN Wound Prevention Protocol Ordered Yes

## 2023-11-04 NOTE — ED PROVIDER NOTES
CHIEF COMPLAINT  Chief Complaint   Patient presents with    Low Back Pain     X 2 weeks. Pt states she almost fell yesterday morning and since has been having worsening low back pain.        LIMITATION TO HISTORY   Select:     HPI    Lorene Haro is a 48 y.o. female who presents to the Emergency Department for evaluation of low back pain ongoing for the last 2 weeks she reports 2 weeks ago she was walking down her hallway she started feel lightheaded like she might pass out she reports she partially fell backwards she does not think she struck her back on the ground but reports her back is been hurting her since that time and that she has been having some weakness in her lower extremities she reports chronic numbness in her legs bilaterally that is at her baseline, patient does have a implantable loop recorder in place she states its been in place for over a year and a half she does not have access to the information from the loop recorder she does not know the , she does report intermittent loss of control of her bladder    OUTSIDE HISTORIAN(S):  Select:    EXTERNAL RECORDS REVIEWED  Select: Other       PAST MEDICAL HISTORY  Past Medical History:   Diagnosis Date    A-fib (Formerly [facility])     MONI (acute kidney injury) (Formerly [facility]) 8/9/2023    Asthma     Bipolar 2 disorder (HCC)     Chickenpox     Chronic obstructive pulmonary disease (HCC)     Hypertension     On home oxygen therapy     Other psychological stress     Schizophrenic disorder (Formerly [facility])     Yeast infection of the skin 04/01/2021     .    SURGICAL HISTORY  Past Surgical History:   Procedure Laterality Date    CHOLECYSTECTOMY      COLECTOMY      HYSTERECTOMY LAPAROSCOPY      KNEE ARTHROSCOPY Left          FAMILY HISTORY  Family History   Problem Relation Age of Onset    No Known Problems Mother     Cancer Sister           SOCIAL HISTORY  Social History     Socioeconomic History    Marital status: Single     Spouse name: Not on file    Number of children: Not  on file    Years of education: Not on file    Highest education level: Not on file   Occupational History    Not on file   Tobacco Use    Smoking status: Former     Current packs/day: 0.00     Types: Cigarettes     Quit date: 10/12/2022     Years since quittin.0    Smokeless tobacco: Never   Vaping Use    Vaping Use: Never used   Substance and Sexual Activity    Alcohol use: Not Currently    Drug use: Never    Sexual activity: Not Currently   Other Topics Concern    Not on file   Social History Narrative    ** Merged History Encounter **         From [place]     Social Determinants of Health     Financial Resource Strain: Medium Risk (2022)    Overall Financial Resource Strain (CARDIA)     Difficulty of Paying Living Expenses: Somewhat hard   Food Insecurity: No Food Insecurity (8/3/2023)    Hunger Vital Sign     Worried About Running Out of Food in the Last Year: Never true     Ran Out of Food in the Last Year: Never true   Transportation Needs: No Transportation Needs (2022)    PRAPARE - Transportation     Lack of Transportation (Medical): No     Lack of Transportation (Non-Medical): No   Physical Activity: Not on file   Stress: Not on file   Social Connections: Not on file   Intimate Partner Violence: Not on file   Housing Stability: Not on file         CURRENT MEDICATIONS  No current facility-administered medications on file prior to encounter.     Current Outpatient Medications on File Prior to Encounter   Medication Sig Dispense Refill    methylPREDNISolone (MEDROL DOSEPAK) 4 MG Tablet Therapy Pack Follow schedule on package instructions. 21 Tablet 0    albuterol 108 (90 Base) MCG/ACT Aero Soln inhalation aerosol Inhale 2 Puffs every 6 hours as needed for Shortness of Breath. 8.5 g 0    fluticasone (FLONASE) 50 MCG/ACT nasal spray Administer 2 Sprays into each nostril every morning.      ADVAIR DISKUS 100-50 MCG/ACT AEROSOL POWDER, BREATH ACTIVATED Inhale 1 Puff 2 times a day.      aspirin 81 MG EC  "tablet Take 81 mg by mouth every day.      furosemide (LASIX) 20 MG Tab Take 1 Tablet by mouth every day. 90 Tablet 0    aripiprazole (ABILIFY) 30 MG tablet Take 1 Tablet by mouth every morning. Indications: Major Depressive Disorder 30 Tablet 0    atorvastatin (LIPITOR) 20 MG Tab Take 1 Tablet by mouth at bedtime. 30 Tablet 0    montelukast (SINGULAIR) 10 MG Tab Take 1 Tablet by mouth every evening. 30 Tablet 0           ALLERGIES  Allergies   Allergen Reactions    Penicillin G Rash and Itching     pt reports that she gets a rash all over her body and gets itchy    Amoxicillin Rash and Itching     Tolerates cephalosporins, pt reports that she gets a rash all over her body and gets itchy       PHYSICAL EXAM  VITAL SIGNS:/53   Pulse 65   Temp 36.8 °C (98.2 °F) (Temporal)   Resp 16   Ht 1.575 m (5' 2\")   Wt 122 kg (268 lb 15.4 oz)   LMP  (LMP Unknown)   SpO2 91%   BMI 49.19 kg/m²       GENERAL: Awake and alert  HEAD: Normocephalic and atraumatic  NECK: Normal range of motion, without meningismus  EYES: Pupils Equal, Round, Reactive to Light, extraocular movements intact, conjunctiva white  ENT: Mucous membranes moist, oropharynx clear  PULMONARY: Normal effort, clear to auscultation  CARDIOVASCULAR: No murmurs, clicks or rubs, peripheral pulses 2+  ABDOMINAL: Soft, non-tender, no guarding or rigidity present, no pulsatile masses  BACK: L3 midline tenderness, no costovertebral tenderness  NEUROLOGICAL: Alert, oriented to person/place/time, Cranial nerves II-XII intact, strength is 4 out of 5 in right knee extension in addition to right plantarflexion otherwise strength is 5/5 throughout, sensation intact to light touch throughout, speech normal, patient able to ambulate independently, DTR 0+ lower extremities, no extremity dysmetria, normal gait EXTREMITIES: No edema, normal to inspection  SKIN: Warm and dry.  PSYCHIATRIC: Affect is appropriate    DIAGNOSTIC STUDIES / PROCEDURES  EKG  Cardiac Monitor " Interpretation:    Time: 1 AM  The cardiac monitor revealed normal sinus rhythm as interpreted by me.  The cardiac monitor was ordered secondary to the patient´s history of near syncope to monitor the patient for dysrhythmia      EKG Interpretation: I independently reviewed the below EKG and did not see signs of a STEMI  9:05 PM  Rate of 65  Nonspecific ST-T wave changes in the inferior leads  Normal sinus rhythm  Results for orders placed or performed during the hospital encounter of 23   EKG   Result Value Ref Range    Report       [facility] Emergency Dept.    Test Date:  2023  Pt Name:    ADELINA MILLAN                Department: ER  MRN:        [id number]                      Room:       [facility]  Gender:     Female                       Technician: [id number]  :        1975                   Requested By:RODO CANALES  Order #:    [id number]                    Reading MD:    Measurements  Intervals                                Axis  Rate:       65                           P:          41  SC:         138                          QRS:        39  QRSD:       107                          T:          -3  QT:         389  QTc:        405    Interpretive Statements  Sinus rhythm  Low voltage, precordial leads  Borderline T abnormalities, inferior leads  Compared to ECG 10/19/2023 15:17:23  T-wave abnormality now present  Ventricular premature complex(es) no longer present  ST (T wave) deviation no longer present           LABS  Labs Reviewed   COMP METABOLIC PANEL - Abnormal; Notable for the following components:       Result Value    Glucose 116 (*)     Alkaline Phosphatase 154 (*)     All other components within normal limits    Narrative:     Biotin intake of greater than 5 mg per day may interfere with                  troponin levels, causing false low values.   CBC WITH DIFFERENTIAL - Abnormal; Notable for the following components:    WBC 11.7 (*)     MCH 26.3 (*)     MCHC 30.5  (*)     RDW 50.4 (*)     Neutrophils-Polys 72.70 (*)     Lymphocytes 20.90 (*)     Neutrophils (Absolute) 8.50 (*)     All other components within normal limits    Narrative:     Biotin intake of greater than 5 mg per day may interfere with                  troponin levels, causing false low values.   TROPONIN - Abnormal; Notable for the following components:    Troponin T 28 (*)     All other components within normal limits    Narrative:     Biotin intake of greater than 5 mg per day may interfere with                  troponin levels, causing false low values.   PROBRAIN NATRIURETIC PEPTIDE, NT - Abnormal; Notable for the following components:    NT-proBNP 235 (*)     All other components within normal limits    Narrative:     Biotin intake of greater than 5 mg per day may interfere with                  troponin levels, causing false low values.   TROPONIN - Abnormal; Notable for the following components:    Troponin T 31 (*)     All other components within normal limits    Narrative:     Biotin intake of greater than 5 mg per day may interfere with                  troponin levels, causing false low values.   URINALYSIS,CULTURE IF INDICATED    Narrative:     Indication for culture:->Patient WITHOUT an indwelling Henao                  catheter in place with new onset of Dysuria, Frequency,                  Urgency, and/or Suprapubic pain                  Release to patient->Immediate   PROTHROMBIN TIME    Narrative:     Biotin intake of greater than 5 mg per day may interfere with                  troponin levels, causing false low values.   ESTIMATED GFR    Narrative:     Biotin intake of greater than 5 mg per day may interfere with                  troponin levels, causing false low values.         RADIOLOGY  I independently reviewed and interpreted the images obtained today in the ER.    Radiologist interpretation:   CT-LSPINE W/O PLUS RECONS   Final Result      1.  Small central posterior disc protrusion at  the L4-5 level with ligamentum flavum hypertrophy. There is mild central canal stenosis and neural foraminal narrowing.   2.  No acute bony process.      DX-CHEST-LIMITED (1 VIEW)   Final Result      1.  Questionable small left pleural effusion poorly evaluated related to patient body habitus.      MR-LUMBAR SPINE-W/O    (Results Pending)        COURSE & MEDICAL DECISION MAKING    ED COURSE:        INTERVENTIONS BY ME:  Medications   lidocaine (Lidoderm) 5 % 1 Patch (1 Patch Transdermal Patch Applied 11/3/23 2226)   albuterol inhaler 2 Puff (has no administration in time range)   ARIPiprazole (Abilify) tablet 30 mg (has no administration in time range)   aspirin EC tablet 81 mg (has no administration in time range)   atorvastatin (Lipitor) tablet 20 mg (has no administration in time range)   montelukast (Singulair) tablet 10 mg (has no administration in time range)   acetaminophen (Tylenol) tablet 650 mg (has no administration in time range)   ketorolac (Toradol) injection 15 mg (has no administration in time range)   nystatin (Mycostatin) powder (has no administration in time range)         INITIAL ASSESSMENT, COURSE AND PLAN  Care Narrative:     Patient presenting after ground-level fall unclear etiology to her fall to me she reported some near syncope symptoms when walking head to toe trauma evaluation notable for tenderness and pain of the lower back do not see trauma elsewhere she did not strike her head had no loss of consciousness no neck pain.  Given her traumatic etiology to her symptoms CT of the lumbar spine was obtained which showed some abnormalities in the setting of her abnormal neurologic examination MRI of the lumbar spine will be obtained in addition to consultation with spine surgery.         ADDITIONAL PROBLEM LIST    DISPOSITION AND DISCUSSIONS  I have discussed management of the patient with the following physicians and JAY's: Spoke to Dr. Mims spine surgeon at 2:20 AM who recommended MRI  lumbar spine and will consult on the patient at 2:50 AM spoke to hospitalist Dr. Quiroga who accepted admission      FINAL DIAGNOSIS  1. Near syncope    2. Inability to perform activities of daily living    3. Weakness of right lower extremity    4. Abnormal CT scan, lumbar spine    5. Spinal stenosis of lumbar region without neurogenic claudication             Electronically signed by: Jase Jefferson DO ,3:54 AM 11/04/23

## 2023-11-04 NOTE — PROGRESS NOTES
Hospital Medicine Daily Progress Note    Date of Service  11/4/2023    Chief Complaint  Lorene Haro is a 48 y.o. female admitted 11/3/2023 with back pain    Hospital Course  This is a 48-year-old female with past medical history of paroxysmal atrial fibrillation no longer on AC, dyslipidemia, chronic hypoxemic respiratory failure secondary to COPD and restrictive lung disease due to body habitus on 2 L, schizophrenia and morbid obesity with a BMI of 50 who presented to the ED on 11/3/2023 with worsening lower back pain x2 weeks and sustained a GLF on day prior to admission.    CT imaging noted small central posterior disc protrusion at the level of L4-L5 with ligamentum flavum hypertrophy, mild central canal stenosis and neuroforaminal narrowing.  Neurosurgery was consulted, MRI imaging small disc bulge, no indication for surgery.  Patient started on Medrol Dosepak and muscle relaxants.  She is to follow-up with neurosurgery outpatient.    Interval Problem Update  Neurosurgery was consulted, MRI imaging small disc bulge, no indication for surgery.      Patient started on Medrol Dosepak and muscle relaxants.  She is to follow-up with neurosurgery outpatient.    Patient to be seen by PT/OT today.    I have discussed this patient's plan of care and discharge plan at IDT rounds today with Case Management, Nursing, Nursing leadership, and other members of the IDT team.    Consultants/Specialty  neurosurgery    Code Status  Full Code    Disposition  The patient is medically cleared for discharge to home or a post-acute facility.  Anticipate discharge to: skilled nursing facility    I have placed the appropriate orders for post-discharge needs.    Review of Systems  Review of Systems   Musculoskeletal:  Positive for back pain.   All other systems reviewed and are negative.       Physical Exam  Temp:  [36.6 °C (97.8 °F)-36.8 °C (98.2 °F)] 36.6 °C (97.9 °F)  Pulse:  [61-75] 63  Resp:  [16-17] 16  BP: ()/(38-68)  100/38  SpO2:  [91 %-98 %] 97 %    Physical Exam  Vitals and nursing note reviewed.   Constitutional:       General: She is not in acute distress.     Appearance: She is obese.   HENT:      Head: Normocephalic and atraumatic.      Mouth/Throat:      Mouth: Mucous membranes are moist.      Pharynx: No oropharyngeal exudate.   Eyes:      Extraocular Movements: Extraocular movements intact.      Pupils: Pupils are equal, round, and reactive to light.   Cardiovascular:      Rate and Rhythm: Normal rate and regular rhythm.      Pulses: Normal pulses.      Heart sounds: No murmur heard.     No friction rub. No gallop.   Pulmonary:      Effort: Pulmonary effort is normal. No respiratory distress.      Breath sounds: No wheezing, rhonchi or rales.   Abdominal:      General: Bowel sounds are normal. There is no distension.      Palpations: Abdomen is soft. There is no mass.      Tenderness: There is no abdominal tenderness.   Musculoskeletal:         General: No swelling or tenderness. Normal range of motion.      Cervical back: Normal range of motion. No rigidity. No muscular tenderness.      Right lower leg: No edema.      Left lower leg: No edema.   Skin:     General: Skin is warm and dry.      Capillary Refill: Capillary refill takes less than 2 seconds.      Findings: No erythema or rash.   Neurological:      General: No focal deficit present.      Mental Status: She is alert and oriented to person, place, and time.      Motor: No weakness.      Gait: Gait normal.      Comments: Neurovascular intact, 4/5 strength bilateral lower extremities, patient with sensation intact in bilateral lower extremities, sensation decreased in bilateral feet         Fluids    Intake/Output Summary (Last 24 hours) at 11/4/2023 1304  Last data filed at 11/4/2023 1110  Gross per 24 hour   Intake 200 ml   Output 250 ml   Net -50 ml       Laboratory  Recent Labs     11/01/23  1326 11/03/23  2150   WBC 10.7 11.7*   RBC 5.06 4.86   HEMOGLOBIN  13.5 12.8   HEMATOCRIT 43.8 41.9   MCV 86.6 86.2   MCH 26.7* 26.3*   MCHC 30.8* 30.5*   RDW 50.3* 50.4*   PLATELETCT 260 240   MPV 12.4 11.4     Recent Labs     11/01/23  1324 11/01/23  1326 11/03/23  2150   SODIUM 143 142 142   POTASSIUM 4.6 4.4 4.0   CHLORIDE 107 105 104   CO2 27 29 29   GLUCOSE 81 86 116*   BUN 13 13 16   CREATININE 0.82 0.81 0.92   CALCIUM 9.8 9.8 9.7     Recent Labs     11/03/23  2150   INR 1.05         Recent Labs     11/01/23  1326   TRIGLYCERIDE 144   HDL 44   LDL 57       Imaging  CT-LSPINE W/O PLUS RECONS   Final Result      1.  Small central posterior disc protrusion at the L4-5 level with ligamentum flavum hypertrophy. There is mild central canal stenosis and neural foraminal narrowing.   2.  No acute bony process.      DX-CHEST-LIMITED (1 VIEW)   Final Result      1.  Questionable small left pleural effusion poorly evaluated related to patient body habitus.      MR-LUMBAR SPINE-W/O    (Results Pending)        Assessment/Plan  * Protrusion of lumbar intervertebral disc  Assessment & Plan  CT imaging noted small central posterior disc protrusion at the level of L4-L5 with ligamentum flavum hypertrophy, mild central canal stenosis and neuroforaminal narrowing.     MRI imaging small disc bulge, no indication for surgery.  Patient started on Medrol Dosepak and muscle relaxants.  She is to follow-up with neurosurgery outpatient.  Multimodal pain control with gabapentin, Lidoderm patch, muscle relaxers and as needed oral and IV opioids -monitor for any signs of respiratory depression  PT/OT    Ligamentum flavum hypertrophy  Assessment & Plan  CT imaging noted small central posterior disc protrusion at the level of L4-L5 with ligamentum flavum hypertrophy, mild central canal stenosis and neuroforaminal narrowing.     MRI imaging small disc bulge, no indication for surgery.  Patient started on Medrol Dosepak and muscle relaxants.  She is to follow-up with neurosurgery outpatient.  Multimodal  pain control with gabapentin, Lidoderm patch, muscle relaxers and as needed oral and IV opioids -monitor for any signs of respiratory depression    Status post placement of implantable loop recorder- (present on admission)  Assessment & Plan  Has loop recorder in place    Restrictive lung disease secondary to obesity- (present on admission)  Assessment & Plan  On chronic O2 2 L baseline    Chronic hypoxic respiratory failure (HCC)- (present on admission)  Assessment & Plan  Secondary to COPD and restrictive lung disease secondary to body habitus  2 L at baseline    Hyperlipidemia  Assessment & Plan  Continue Lipitor    Paroxysmal atrial fibrillation (HCC)- (present on admission)  Assessment & Plan  No longer on Coumadin    Schizophrenia (HCC)- (present on admission)  Assessment & Plan  Continue home medications    Morbid obesity with alveolar hypoventilation (HCC)- (present on admission)  Assessment & Plan  Counseled on weight loss  Recommended bariatric surgery         VTE prophylaxis: Heparin ppx    I have performed a physical exam and reviewed and updated ROS and Plan today (11/4/2023). In review of yesterday's note (11/3/2023), there are no changes except as documented above.

## 2023-11-04 NOTE — ED NOTES
Pt attempted to ambulate to the bathroom.  After standing pt felt weak and dizzy.  Pt seated into chair in room. Pt brought to restroom with wheelchair.  Urine sample collected and sent. Pt wheeled back to room and reattached to monitor.

## 2023-11-04 NOTE — H&P
Hospital Medicine History & Physical Note    Date of Service  11/4/2023    Primary Care Physician  Pcp Not In Computer    Consultants  Neurosurgery    Code Status  Full Code    Chief Complaint  Chief Complaint   Patient presents with    Low Back Pain     X 2 weeks. Pt states she almost fell yesterday morning and since has been having worsening low back pain.        History of Presenting Illness  Lorene Haro is a 48 y.o. female who presented 11/3/2023 with low back pain.  Patient states that for the last 2 weeks, she has reported a dull pain in her lower back that has been constant and progressively getting worse.  She states that she began to notice loss of control of her bladder and also began to notice a shooting pain coming down her left lower extremity.  Patient resides in a group home and yesterday she states that she was ambulating, she almost fell, but caught herself before she fell.  Since then her lower back has been progressively getting worse, prompting her to come into ER for evaluation.  She denies any fever chills chest pain shortness of breath.  She denies any IV drug use.    In ER, patient found to have normal vital signs.  CT lumbar spine showing small central posterior disc protrusion at the L4-L5 level with ligamentum flavum hypertrophy, mild central canal stenosis and neuroforaminal narrowing.  Neurosurgery consulted, recommend MRI lumbar spine    I discussed the plan of care with patient.    Review of Systems  Review of Systems   Constitutional:  Positive for malaise/fatigue.   HENT: Negative.     Eyes: Negative.    Respiratory: Negative.     Cardiovascular: Negative.    Gastrointestinal: Negative.    Genitourinary: Negative.    Musculoskeletal:  Positive for back pain.   Skin: Negative.    Neurological: Negative.    Endo/Heme/Allergies: Negative.    Psychiatric/Behavioral: Negative.         Past Medical History   has a past medical history of A-fib ([facility]), MONI (acute kidney injury) ([facility])  (8/9/2023), Asthma, Bipolar 2 disorder (HCC), Chickenpox, Chronic obstructive pulmonary disease (HCC), Hypertension, On home oxygen therapy, Other psychological stress, Schizophrenic disorder (HCC), and Yeast infection of the skin (04/01/2021).    Surgical History   has a past surgical history that includes hysterectomy laparoscopy; colectomy; cholecystectomy; and knee arthroscopy (Left).     Family History  family history includes Cancer in her sister; No Known Problems in her mother.   Family history reviewed with patient. There is no family history that is pertinent to the chief complaint.     Social History   reports that she quit smoking about 12 months ago. Her smoking use included cigarettes. She has never used smokeless tobacco. She reports that she does not currently use alcohol. She reports that she does not use drugs.    Allergies  Allergies   Allergen Reactions    Penicillin G Rash and Itching     pt reports that she gets a rash all over her body and gets itchy    Amoxicillin Rash and Itching     Tolerates cephalosporins, pt reports that she gets a rash all over her body and gets itchy       Medications  Prior to Admission Medications   Prescriptions Last Dose Informant Patient Reported? Taking?   ADVAIR DISKUS 100-50 MCG/ACT AEROSOL POWDER, BREATH ACTIVATED  Patient Yes No   Sig: Inhale 1 Puff 2 times a day.   DM-Phenylephrine-Acetaminophen (VICKS DAYQUIL COLD & FLU) 10-5-325 MG/15ML Liquid  Patient Yes No   Sig: Take 30 mL by mouth every 6 hours as needed (Cough, Mild Pain or Fever).   Phenyleph-Doxylamine-DM-APAP (NYQUIL SEVERE COLD/FLU) 5-6.25- MG/15ML Liquid  Patient Yes No   Sig: Take 30 mL by mouth at bedtime as needed (Cough, Trouble Sleeping, Mild Pain or Fever).   albuterol 108 (90 Base) MCG/ACT Aero Soln inhalation aerosol  Patient No No   Sig: Inhale 2 Puffs every 6 hours as needed for Shortness of Breath.   aripiprazole (ABILIFY) 30 MG tablet  Patient No No   Sig: Take 1 Tablet by  mouth every morning. Indications: Major Depressive Disorder   aspirin 81 MG EC tablet  Patient Yes No   Sig: Take 81 mg by mouth every day.   atorvastatin (LIPITOR) 20 MG Tab  Patient No No   Sig: Take 1 Tablet by mouth at bedtime.   fluticasone (FLONASE) 50 MCG/ACT nasal spray  Patient Yes No   Sig: Administer 2 Sprays into each nostril every morning.   furosemide (LASIX) 20 MG Tab  Patient No No   Sig: Take 1 Tablet by mouth every day.   methylPREDNISolone (MEDROL DOSEPAK) 4 MG Tablet Therapy Pack   No No   Sig: Follow schedule on package instructions.   montelukast (SINGULAIR) 10 MG Tab  Patient No No   Sig: Take 1 Tablet by mouth every evening.   potassium chloride (KLOR-CON) 20 MEQ Pack  Patient Yes No   Sig: Take 20 mEq by mouth every day.      Facility-Administered Medications: None       Physical Exam  Temp:  [36.6 °C (97.8 °F)] 36.6 °C (97.8 °F)  Pulse:  [65-75] 67  Resp:  [16] 16  BP: ()/(50-68) 96/52  SpO2:  [91 %-98 %] 91 %  Blood Pressure: 96/52   Temperature: 36.6 °C (97.8 °F)   Pulse: 67   Respiration: 16   Pulse Oximetry: 91 %       Physical Exam  Constitutional:       Appearance: Normal appearance. She is obese.   HENT:      Head: Normocephalic.      Nose: Nose normal.      Mouth/Throat:      Mouth: Mucous membranes are moist.   Eyes:      Pupils: Pupils are equal, round, and reactive to light.   Cardiovascular:      Rate and Rhythm: Normal rate and regular rhythm.      Pulses: Normal pulses.   Pulmonary:      Effort: Pulmonary effort is normal.      Breath sounds: Normal breath sounds.   Abdominal:      General: Abdomen is flat. Bowel sounds are normal.      Palpations: Abdomen is soft.   Musculoskeletal:      Cervical back: Neck supple.      Comments: Bilateral pitting edema 1+  Chronic venous changes noted in lower extremities   Skin:     General: Skin is warm.   Neurological:      Mental Status: She is alert and oriented to person, place, and time. Mental status is at baseline.       Comments:   Straight leg raise negative bilaterally           Laboratory:  Recent Labs     11/01/23  1326 11/03/23  2150   WBC 10.7 11.7*   RBC 5.06 4.86   HEMOGLOBIN 13.5 12.8   HEMATOCRIT 43.8 41.9   MCV 86.6 86.2   MCH 26.7* 26.3*   MCHC 30.8* 30.5*   RDW 50.3* 50.4*   PLATELETCT 260 240   MPV 12.4 11.4     Recent Labs     11/01/23  1324 11/01/23  1326 11/03/23  2150   SODIUM 143 142 142   POTASSIUM 4.6 4.4 4.0   CHLORIDE 107 105 104   CO2 27 29 29   GLUCOSE 81 86 116*   BUN 13 13 16   CREATININE 0.82 0.81 0.92   CALCIUM 9.8 9.8 9.7     Recent Labs     11/01/23  1324 11/01/23  1326 11/03/23  2150   ALTSGPT  --  20 19   ASTSGOT  --  15 16   ALKPHOSPHAT  --  158* 154*   TBILIRUBIN  --  1.0 0.5   GLUCOSE 81 86 116*     Recent Labs     11/03/23  2150   INR 1.05     Recent Labs     11/03/23  2150   NTPROBNP 235*     Recent Labs     11/01/23  1326   TRIGLYCERIDE 144   HDL 44   LDL 57     Recent Labs     11/03/23  2150   TROPONINT 28*       Imaging:  CT-LSPINE W/O PLUS RECONS   Final Result      1.  Small central posterior disc protrusion at the L4-5 level with ligamentum flavum hypertrophy. There is mild central canal stenosis and neural foraminal narrowing.   2.  No acute bony process.      DX-CHEST-LIMITED (1 VIEW)   Final Result      1.  Questionable small left pleural effusion poorly evaluated related to patient body habitus.      MR-LUMBAR SPINE-W/O    (Results Pending)       no X-Ray or EKG requiring interpretation    Assessment/Plan:  Justification for Admission Status  I anticipate this patient will require at least two midnights for appropriate medical management, necessitating inpatient admission because patient has ligamentum flavum hypertrophy    Patient will need a Med/Surg bed on NEUROSURGERY service .  The need is secondary to ligamentum flavum hypertrophy.    Ligamentum flavum hypertrophy  Assessment & Plan  Presents for worsening low back pain with shooting pain down her left lower extremity and  intermittent loss of bladder control the last 2 weeks.  CT L-spine showing ligamentum flavum hypertrophy with mild central canal stenosis and neuroforaminal narrowing.  Neurosurgery contacted by ERP, recommend MRI lumbar spine.   Patient will be left n.p.o. midnight.  Patient will be treated with Toradol overnight.  PT OT in AM.    Status post placement of implantable loop recorder- (present on admission)  Assessment & Plan  Has loop recorder in place    Hyperlipidemia  Assessment & Plan  Continue Lipitor    Schizophrenia (HCC)- (present on admission)  Assessment & Plan  Continue home medications        VTE prophylaxis: pharmacologic prophylaxis contraindicated due to possible surgery in a.m.

## 2023-11-04 NOTE — THERAPY
Occupational Therapy   Initial Evaluation     Patient Name: Lorene Haro  Age:  48 y.o., Sex:  female  Medical Record #: [id number]  Today's Date: 11/4/2023    Precautions: Fall Risk  Comments: Spine/back precautions for comfort    Assessment  Patient is 48 y.o. female admitted with worsening lower back pain. CT noted central posterior disc protrusion at the level of L4-L5 with ligamentum flavum hypertrophy, mild central canal stenosis and neuroforaminal narrowing. MRI imaging showed small disc bulge. Neurosurgery reported no surgical intervention at this time with outpatient follow up. PMHx of paroxysmal afib no longer on AC, dyslipidemia, hypoxemic respiratory failure secondary to COPD and restrictive lung disease due to body habitus on 2 L, schizophrenia and morbid obesity. Pt currently resides at a group home where they provide assist with her IADLs; pt independent with ADLs.    During OT eval, pt demo ability to complete most ADLs, functional mobility, and txfs with supv and FWW. She required min A to complete LB dressing due to pain; reports group home can assist. Pt provided education regarding spinal precautions (for comfort), energy conservation, home safety, importance of OOB activity, and compensatory strategies to safely complete ADLs. Pt has no further acute OT needs. Anticipate that she will have no further OT needs after DC as long as group home is able to provide increased assist with ADLs. If they are unable, pt would benefit from home health for continued OT services. Patient will not be actively followed for occupational therapy services at this time, however may be seen if requested by physician for 1 more visit within 30 days to address any discharge or equipment needs.       Plan    Occupational Therapy Initial Treatment Plan   Duration: Discharge Needs Only    DC Equipment Recommendations: None  Discharge Recommendations: Anticipate that the patient will have no further occupational therapy  needs after discharge from the hospital (Increased support from group home staff to assist with ADLs.)     Objective      Prior Living Situation   Prior Services Continuous (24 Hour) Care Giving Per Service   Housing / Facility Group Home  ([facility])   Steps Into Home 0   Steps In Home 0   Bathroom Set up Walk In Shower;Grab Bars;Shower Chair   Equipment Owned 4-Wheel Walker;Tub / Shower Seat;Grab Bar(s) In Tub / Shower;Hand Held Shower   Lives with - Patient's Self Care Capacity Unrelated Adult   Comments Pt currently resides at a  group home where there is someone there 24/7. Pt reports that there are 3 other females who reside on the home. She has a house mother and a couple of caregivers. Reports that group home provides assist with her IADLs, but can assist with ADLs during her recovery.   Prior Level of ADL Function   Self Feeding Independent   Grooming / Hygiene Independent   Bathing Independent   Dressing Independent   Toileting Independent   Prior Level of IADL Function   Medication Management Requires Assist   Laundry Requires Assist   Kitchen Mobility Requires Assist   Finances Requires Assist   Home Management Requires Assist   Shopping Requires Assist   Prior Level Of Mobility Independent With Device in Community;Independent With Device in Home   Driving / Transportation Relatives / Others Provide Transportation   History of Falls   History of Falls Yes   Precautions   Precautions Fall Risk   Comments Spine/back precautions for comfort   Vitals   O2 (LPM) 2   O2 Delivery Device Nasal Cannula   Pain 0 - 10 Group   Therapist Pain Assessment Post Activity Pain Same as Prior to Activity;Nurse Notified  (Not rated, agreeable to activity. RN provided pt with pain meds prior to start of session)   Cognition    Comments Pt was pleasant and cooperative during session   Active ROM Upper Body   Active ROM Upper Body  WDL   Dominant Hand Right   Comments Observed during functional tasks   Strength Upper  Body   Upper Body Strength  WDL   Comments Observed during functional task   Sensation Upper Body   Comments No c/o numbness or tingling in BUE   Balance Assessment   Sitting Balance (Static) Fair +   Sitting Balance (Dynamic) Fair   Standing Balance (Static) Fair   Standing Balance (Dynamic) Fair   Weight Shift Sitting Good   Weight Shift Standing Fair   Comments w/FWW   Bed Mobility    Scooting Supervised  (Seated)   Comments Pt up to chair pre and post   ADL Assessment   Eating Modified Independent   Grooming Supervision;Standing  (Oral care, hand washing, and face brushing at sink)   Lower Body Dressing Minimal Assist  (Required assist with socks. reports group home can assist as needed)   Toileting Supervision  (Able to complete pericare after voiding on standard toilet)   6 Clicks Daily Activity Score 21   Functional Mobility   Sit to Stand Supervised   Bed, Chair, Wheelchair Transfer Supervised   Toilet Transfers Supervised   Mobility Functional mobility in room and hallway (for endurance and way finding) w/FWW   Activity Tolerance   Sitting in Chair Up to chair pre and post   Sitting Edge of Bed 3 min   Standing 10 min   Education Group   Education Provided Spinal Precautions;Energy Conservation;Home Safety;Role of Occupational Therapist;Activities of Daily Living;Pathology of bedrest   Role of Occupational Therapist Patient Response Patient;Acceptance;Explanation;Verbal Demonstration   Spinal Precautions Patient Response Patient;Acceptance;Explanation;Demonstration;Handout;Verbal Demonstration;Action Demonstration   Energy Conservation Patient Response Patient;Acceptance;Explanation;Verbal Demonstration  (Pacing activities and progressing intensity over time)   Home Safety Patient Response Patient;Acceptance;Explanation;Verbal Demonstration  (Recommended continued use of shower chair and having supervision stepping in/out of shower to reduce risk of falls)   ADL Patient Response  Patient;Acceptance;Explanation;Verbal Demonstration  (Compensatory stratgies to safely complete ADLs)   Pathology of Bedrest Patient Response Patient;Acceptance;Explanation;Verbal Demonstration

## 2023-11-04 NOTE — ED NOTES
Pt transferred to Y64. Bedside report received from JC Suarez, assumed care of patient.  POC discussed with patient. Call light within reach, all needs addressed at this time.       Fall risk interventions in place: Move the patient closer to the nurse's station, Patient's personal possessions are with in their safe reach, Place fall risk sign on patient's door, and Keep floor surfaces clean and dry (all applicable per [place] Fall risk assessment)   Continuous monitoring: Pulse Ox or Blood Pressure  IVF/IV medications: Not Applicable   Oxygen: Room Air  Bedside sitter: Not Applicable   Isolation: Not Applicable

## 2023-11-04 NOTE — PROGRESS NOTES
MRI reviewed, small disc bulge, no surgery indicated. Medrol dose pack, muscle relaxants, PT, outpatient followup with our PA's at [facility] ([phone]) for apt, could consider inpatient intralaminar Tin if IR willing to do it, otherwise will arrange as outpatient. Call with questions

## 2023-11-04 NOTE — HOSPITAL COURSE
This is a 48-year-old female with past medical history of paroxysmal atrial fibrillation no longer on AC, dyslipidemia, chronic hypoxemic respiratory failure secondary to COPD and restrictive lung disease due to body habitus on 2 L, schizophrenia and morbid obesity with a BMI of 50 who presented to the ED on 11/3/2023 with worsening lower back pain x2 weeks and sustained a GLF on day prior to admission.    CT imaging noted small central posterior disc protrusion at the level of L4-L5 with ligamentum flavum hypertrophy, mild central canal stenosis and neuroforaminal narrowing.  Neurosurgery was consulted, MRI imaging small disc bulge, no indication for surgery.  Patient started on Medrol Dosepak and muscle relaxants.  She is to follow-up with neurosurgery outpatient.  Patient evaluated by PT/OT with no recommendations for further therapies.  Patient resides at a group home, ordered home health and provided outpatient PT/OT referrals if needed.

## 2023-11-04 NOTE — ED NOTES
Med rec complete per patient/pharmacy records  Unable to reach [facility] home, no MAR sent with pt.  Allergies reviewed.   Anticoagulants taken in the last 14 days? No     Preferred pharmacy for this visit - [facility]

## 2023-11-05 ENCOUNTER — PHARMACY VISIT (OUTPATIENT)
Dept: PHARMACY | Facility: MEDICAL CENTER | Age: 48
End: 2023-11-05
Payer: COMMERCIAL

## 2023-11-05 VITALS
RESPIRATION RATE: 17 BRPM | OXYGEN SATURATION: 92 % | TEMPERATURE: 97.7 F | BODY MASS INDEX: 49.49 KG/M2 | DIASTOLIC BLOOD PRESSURE: 53 MMHG | SYSTOLIC BLOOD PRESSURE: 108 MMHG | HEART RATE: 61 BPM | WEIGHT: 268.96 LBS | HEIGHT: 62 IN

## 2023-11-05 LAB
ANION GAP SERPL CALC-SCNC: 8 MMOL/L (ref 7–16)
BUN SERPL-MCNC: 18 MG/DL (ref 8–22)
CALCIUM SERPL-MCNC: 9.1 MG/DL (ref 8.5–10.5)
CHLORIDE SERPL-SCNC: 105 MMOL/L (ref 96–112)
CO2 SERPL-SCNC: 29 MMOL/L (ref 20–33)
CREAT SERPL-MCNC: 0.95 MG/DL (ref 0.5–1.4)
ERYTHROCYTE [DISTWIDTH] IN BLOOD BY AUTOMATED COUNT: 49.3 FL (ref 35.9–50)
GFR SERPLBLD CREATININE-BSD FMLA CKD-EPI: 73 ML/MIN/1.73 M 2
GLUCOSE SERPL-MCNC: 147 MG/DL (ref 65–99)
HCT VFR BLD AUTO: 37.9 % (ref 37–47)
HGB BLD-MCNC: 11.7 G/DL (ref 12–16)
MAGNESIUM SERPL-MCNC: 2.3 MG/DL (ref 1.5–2.5)
MCH RBC QN AUTO: 26.5 PG (ref 27–33)
MCHC RBC AUTO-ENTMCNC: 30.9 G/DL (ref 32.2–35.5)
MCV RBC AUTO: 85.7 FL (ref 81.4–97.8)
PHOSPHATE SERPL-MCNC: 3 MG/DL (ref 2.5–4.5)
PLATELET # BLD AUTO: 226 K/UL (ref 164–446)
PMV BLD AUTO: 11.4 FL (ref 9–12.9)
POTASSIUM SERPL-SCNC: 4.5 MMOL/L (ref 3.6–5.5)
RBC # BLD AUTO: 4.42 M/UL (ref 4.2–5.4)
SODIUM SERPL-SCNC: 142 MMOL/L (ref 135–145)
WBC # BLD AUTO: 8.4 K/UL (ref 4.8–10.8)

## 2023-11-05 PROCEDURE — 80048 BASIC METABOLIC PNL TOTAL CA: CPT

## 2023-11-05 PROCEDURE — A9270 NON-COVERED ITEM OR SERVICE: HCPCS | Performed by: STUDENT IN AN ORGANIZED HEALTH CARE EDUCATION/TRAINING PROGRAM

## 2023-11-05 PROCEDURE — RXMED WILLOW AMBULATORY MEDICATION CHARGE: Performed by: GENERAL PRACTICE

## 2023-11-05 PROCEDURE — 700101 HCHG RX REV CODE 250: Performed by: GENERAL PRACTICE

## 2023-11-05 PROCEDURE — 700111 HCHG RX REV CODE 636 W/ 250 OVERRIDE (IP): Performed by: GENERAL PRACTICE

## 2023-11-05 PROCEDURE — 36415 COLL VENOUS BLD VENIPUNCTURE: CPT

## 2023-11-05 PROCEDURE — 700102 HCHG RX REV CODE 250 W/ 637 OVERRIDE(OP): Performed by: GENERAL PRACTICE

## 2023-11-05 PROCEDURE — 99239 HOSP IP/OBS DSCHRG MGMT >30: CPT | Performed by: GENERAL PRACTICE

## 2023-11-05 PROCEDURE — 700102 HCHG RX REV CODE 250 W/ 637 OVERRIDE(OP): Performed by: STUDENT IN AN ORGANIZED HEALTH CARE EDUCATION/TRAINING PROGRAM

## 2023-11-05 PROCEDURE — A9270 NON-COVERED ITEM OR SERVICE: HCPCS | Performed by: GENERAL PRACTICE

## 2023-11-05 PROCEDURE — 83735 ASSAY OF MAGNESIUM: CPT

## 2023-11-05 PROCEDURE — 84100 ASSAY OF PHOSPHORUS: CPT

## 2023-11-05 PROCEDURE — 85027 COMPLETE CBC AUTOMATED: CPT

## 2023-11-05 RX ORDER — AMOXICILLIN 250 MG
2 CAPSULE ORAL DAILY
Qty: 28 TABLET | Refills: 0 | Status: SHIPPED | OUTPATIENT
Start: 2023-11-05 | End: 2023-11-19

## 2023-11-05 RX ORDER — METHOCARBAMOL 500 MG/1
500 TABLET, FILM COATED ORAL 4 TIMES DAILY
Qty: 120 TABLET | Refills: 0 | Status: SHIPPED | OUTPATIENT
Start: 2023-11-05 | End: 2023-11-12 | Stop reason: SDUPTHER

## 2023-11-05 RX ORDER — OXYCODONE HYDROCHLORIDE AND ACETAMINOPHEN 5; 325 MG/1; MG/1
1 TABLET ORAL EVERY 6 HOURS PRN
Qty: 20 TABLET | Refills: 0 | Status: SHIPPED | OUTPATIENT
Start: 2023-11-05 | End: 2023-11-10

## 2023-11-05 RX ORDER — METHYLPREDNISOLONE 4 MG/1
TABLET ORAL
Qty: 21 TABLET | Refills: 0 | Status: SHIPPED | OUTPATIENT
Start: 2023-11-05 | End: 2023-11-29

## 2023-11-05 RX ORDER — LIDOCAINE 50 MG/G
2 PATCH TOPICAL EVERY 24 HOURS
Qty: 28 PATCH | Refills: 0 | Status: SHIPPED | OUTPATIENT
Start: 2023-11-05 | End: 2023-11-19

## 2023-11-05 RX ORDER — GABAPENTIN 100 MG/1
100 CAPSULE ORAL 3 TIMES DAILY
Qty: 90 CAPSULE | Refills: 0 | Status: SHIPPED | OUTPATIENT
Start: 2023-11-05 | End: 2023-12-05

## 2023-11-05 RX ADMIN — METHYLPREDNISOLONE 4 MG: 4 TABLET ORAL at 08:27

## 2023-11-05 RX ADMIN — NYSTATIN: 100000 POWDER TOPICAL at 04:25

## 2023-11-05 RX ADMIN — TRAMADOL HYDROCHLORIDE 50 MG: 50 TABLET ORAL at 08:28

## 2023-11-05 RX ADMIN — FUROSEMIDE 20 MG: 20 TABLET ORAL at 04:24

## 2023-11-05 RX ADMIN — POLYETHYLENE GLYCOL 3350 1 PACKET: 17 POWDER, FOR SOLUTION ORAL at 04:32

## 2023-11-05 RX ADMIN — HEPARIN SODIUM 5000 UNITS: 5000 INJECTION, SOLUTION INTRAVENOUS; SUBCUTANEOUS at 06:36

## 2023-11-05 RX ADMIN — ARIPIPRAZOLE 30 MG: 15 TABLET ORAL at 04:23

## 2023-11-05 RX ADMIN — OXYCODONE AND ACETAMINOPHEN 1 TABLET: 5; 325 TABLET ORAL at 06:36

## 2023-11-05 RX ADMIN — MOMETASONE FUROATE AND FORMOTEROL FUMARATE DIHYDRATE 2 PUFF: 100; 5 AEROSOL RESPIRATORY (INHALATION) at 04:24

## 2023-11-05 RX ADMIN — LIDOCAINE 2 PATCH: 700 PATCH TOPICAL at 08:27

## 2023-11-05 RX ADMIN — GABAPENTIN 100 MG: 100 CAPSULE ORAL at 08:27

## 2023-11-05 RX ADMIN — DOCUSATE SODIUM 50 MG AND SENNOSIDES 8.6 MG 2 TABLET: 8.6; 5 TABLET, FILM COATED ORAL at 04:24

## 2023-11-05 ASSESSMENT — PAIN DESCRIPTION - PAIN TYPE: TYPE: ACUTE PAIN

## 2023-11-05 NOTE — PROGRESS NOTES
Discharge instructions provided and reviewed w/pt. Prescriptions brought to bedside. Patient educated on when to seek out immediate medical attention. All questions answered. All belongings taken.

## 2023-11-05 NOTE — DISCHARGE PLANNING
Case Management Discharge Planning    Admission Date: 11/3/2023  GMLOS: 2.9  ALOS: 1    6-Clicks ADL Score: 21  6-Clicks Mobility Score: 20      Anticipated Discharge Dispo:      DME Needed: No    Action(s) Taken: Updated Provider/Nurse on Discharge Plan and Transport Arranged , Home Health agencies are denying services due to insurance, notified Dr Ghosh, states it is okay to discharge without HH. Transport scheduled for 12:00. Notified patient of no HH and ride time. Patient discussed during IDT rounds with medical team.     Escalations Completed: Provider    Medically Clear: Yes    Next Steps: CM will continue to follow for discharge planning needs.     Barriers to Discharge: None

## 2023-11-05 NOTE — CONSULTS
DATE OF SERVICE:  11/04/2023     INPATIENT CONSULTATION     CHIEF COMPLAINT:  Back pain, leg pain.     HISTORY OF PRESENT ILLNESS:  This is a 48-year-old woman who reported   increasing back pain for the last 2 weeks and 2 days ago she developed   increasing problems with urinating on herself.  She notices shooting pain in   bilateral legs.  It seems to go down the back of both legs.  Came to the ER.    She denies any recent evidence of illness in terms of fevers or chills.  CAT   scan showed a small central disk protrusion at L4-L5, but an MRI is still   pending.     PAST MEDICAL HISTORY:  Significant for atrial fibrillation.  She is on   aspirin. Bipolar, asthma, COPD, hypertension.  She is on home oxygen.    Schizophrenic disorder, yeast infections to the skin.     PAST SURGICAL HISTORY:  Includes hysterectomy, partial colectomy, gallbladder   and knee surgery.     SOCIAL HISTORY:  She reports former smoker.  She does not drink.     PHYSICAL EXAMINATION:  This woman is morbidly obese.  She has mild perineal   numbness, which may be positional.  She has intact rectal tone.  She has full   strength in iliopsoas, quadriceps, dorsiflexion bilaterally, EHL is 4+,   plantar flexion is somewhat pain limited bilaterally 4+.  She has decreased   sensation in bilateral L5 and S1 to light touch.  She has muted ankle and knee   reflexes.     ASSESSMENT AND PLAN:  The patient has radiculopathy and morbid obesity.  MRI   is pending.  I recommend a Medrol Dosepak and muscle relaxants.  We will   evaluate the imaging.  This will be an inpatient versus outpatient care.  She   will be on every 4 hour neuro checks.     Thanks for allowing participate in her care.        ______________________________  MD MAZIN Lainez III/SHIKHA    DD:  11/04/2023 07:37  DT:  11/04/2023 07:50    Job#:  [id number]

## 2023-11-05 NOTE — CARE PLAN
Problem: Pain - Standard  Goal: Alleviation of pain or a reduction in pain to the patient’s comfort goal  Outcome: Progressing     Problem: Knowledge Deficit - Standard  Goal: Patient and family/care givers will demonstrate understanding of plan of care, disease process/condition, diagnostic tests and medications  Outcome: Progressing     Problem: Skin Integrity  Goal: Skin integrity is maintained or improved  Outcome: Progressing   The patient is     Shift Goals  Clinical Goals: Ambulation,safety  Patient Goals: Ambulation around the unit  Family Goals: Not present    Progress made toward(s) clinical / shift goals:      Patient is not progressing towards the following goals:

## 2023-11-05 NOTE — DISCHARGE PLANNING
Received choice form @: 0758  Agency/Facility name: [facility]  Sent referral per choice form @: 0758    0923  Patient has been declined by [facility]. Referral sent to [facility] per JC Melendez request.    1009  Spoke with Katelyn at [facility], they have no wheelchair transport for today. Patient has been upgraded to [facility]  Patient to transfer to her group home today at 1200 via [facility]. Katelyn has spoken with [facility] and provided insurance auth. JC Melendez advised of transport time

## 2023-11-05 NOTE — FACE TO FACE
Face to Face Supporting Documentation - Home Health    The encounter with this patient was in whole or in part the primary reason for home health admission.    Date of encounter:   Patient:                    MRN:                       YOB: 2025  Lorene Haro  [id number]  1975     Home health to see patient for:  Skilled Nursing care for assessment, interventions & education, Home health aide, Physical Therapy evaluation and treatment, and Occupational therapy evaluation and treatment    Skilled need for:  New Onset Medical Diagnosis  L4-5 small disc protrusion    Skilled nursing interventions to include:  Comment: HH/PT/OT    Homebound status evidenced by:  Need the aid of supportive devices such as crutches, canes, wheelchairs or walkers. Leaving home requires a considerable and taxing effort. There is a normal inability to leave the home.    Community Physician to provide follow up care: Pcp Not In Computer     Optional Interventions? No      I certify the face to face encounter for this home health care referral meets the CMS requirements and the encounter/clinical assessment with the patient was, in whole, or in part, for the medical condition(s) listed above, which is the primary reason for home health care. Based on my clinical findings: the service(s) are medically necessary, support the need for home health care, and the homebound criteria are met.  I certify that this patient has had a face to face encounter by myself.  Macy Ghosh D.O. - NPI: [id number]

## 2023-11-05 NOTE — DISCHARGE SUMMARY
Discharge Summary    CHIEF COMPLAINT ON ADMISSION  Chief Complaint   Patient presents with    Low Back Pain     X 2 weeks. Pt states she almost fell yesterday morning and since has been having worsening low back pain.        Reason for Admission  Back Pain     Admission Date  11/3/2023    CODE STATUS  Full Code    HPI & HOSPITAL COURSE  This is a 48-year-old female with past medical history of paroxysmal atrial fibrillation no longer on AC, dyslipidemia, chronic hypoxemic respiratory failure secondary to COPD and restrictive lung disease due to body habitus on 2 L, schizophrenia and morbid obesity with a BMI of 50 who presented to the ED on 11/3/2023 with worsening lower back pain x2 weeks and sustained a GLF on day prior to admission.    CT imaging noted small central posterior disc protrusion at the level of L4-L5 with ligamentum flavum hypertrophy, mild central canal stenosis and neuroforaminal narrowing.  Neurosurgery was consulted, MRI imaging small disc bulge, no indication for surgery.  Patient started on Medrol Dosepak and muscle relaxants.  She is to follow-up with neurosurgery outpatient.    Therefore, she is discharged in good and stable condition to home with organized home healthcare and close outpatient follow-up.    The patient met 2-midnight criteria for an inpatient stay at the time of discharge.    Discharge Date  11/5/2023    FOLLOW UP ITEMS POST DISCHARGE  Primary care physician  Neurosurgery    DISCHARGE DIAGNOSES  Principal Problem:    Protrusion of lumbar intervertebral disc (POA: Unknown)  Active Problems:    Ligamentum flavum hypertrophy (POA: Unknown)    Morbid obesity with alveolar hypoventilation (HCC) (POA: Yes)    Schizophrenia (HCC) (POA: Yes)    Paroxysmal atrial fibrillation (HCC) (POA: Yes)    Hyperlipidemia (POA: Unknown)    Chronic hypoxic respiratory failure (HCC) (POA: Yes)    Restrictive lung disease secondary to obesity (POA: Yes)    Status post placement of implantable loop  recorder (POA: Yes)  Resolved Problems:    Pre-syncope (POA: Yes)      FOLLOW UP  Future Appointments   Date Time Provider Department Center   11/29/2023  3:45 PM SRAVANTHI Suarez None     Joe Mims III, M.D.  [address]  [address]  [facility] [id number]  [phone]    Schedule an appointment as soon as possible for a visit in 4 week(s)        MEDICATIONS ON DISCHARGE     Medication List        START taking these medications        Instructions   gabapentin 100 MG Caps  Commonly known as: Neurontin   Take 1 Capsule by mouth in the morning, at noon, and at bedtime for 30 days.  Dose: 100 mg     lidocaine 5 % Ptch  Commonly known as: Lidoderm   Place 2 Patches on the skin every 24 hours for 14 days.  Dose: 2 Patch     methocarbamol 500 MG Tabs  Commonly known as: Robaxin   Take 1 Tablet by mouth 4 times a day for 30 days.  Dose: 500 mg     oxyCODONE-acetaminophen 5-325 MG Tabs  Commonly known as: Percocet   Take 1 Tablet by mouth every 6 hours as needed for Severe Pain for up to 5 days. Indications: Pain  Dose: 1 Tablet     senna-docusate 8.6-50 MG Tabs  Commonly known as: Pericolace Or Senokot S   Take 2 Tablets by mouth every day for 14 days.  Dose: 2 Tablet            CONTINUE taking these medications        Instructions   Advair Diskus 100-50 MCG/ACT Aepb  Generic drug: fluticasone-salmeterol   Inhale 1 Puff 2 times a day.  Dose: 1 Puff     albuterol 108 (90 Base) MCG/ACT Aers inhalation aerosol   Inhale 2 Puffs every 6 hours as needed for Shortness of Breath.  Dose: 2 Puff     aripiprazole 30 MG tablet  Commonly known as: Abilify   Take 1 Tablet by mouth every morning. Indications: Major Depressive Disorder  Dose: 30 mg     aspirin 81 MG EC tablet   Take 81 mg by mouth every day.  Dose: 81 mg     atorvastatin 20 MG Tabs  Commonly known as: Lipitor   Take 1 Tablet by mouth at bedtime.  Dose: 20 mg     fluticasone 50 MCG/ACT nasal spray  Commonly known as: Flonase   Administer 2 Sprays into  each nostril every morning.  Dose: 2 Spray     furosemide 20 MG Tabs  Commonly known as: Lasix   Take 1 Tablet by mouth every day.  Dose: 20 mg     methylPREDNISolone 4 MG Tbpk  Commonly known as: Medrol Dosepak   Follow schedule on package instructions.     montelukast 10 MG Tabs  Commonly known as: Singulair   Take 1 Tablet by mouth every evening.  Dose: 10 mg              Allergies  Allergies   Allergen Reactions    Penicillin G Rash and Itching     pt reports that she gets a rash all over her body and gets itchy    Amoxicillin Rash and Itching     Tolerates cephalosporins, pt reports that she gets a rash all over her body and gets itchy       DIET  Orders Placed This Encounter   Procedures    Diet Order Diet: Cardiac     Standing Status:   Standing     Number of Occurrences:   1     Order Specific Question:   Diet:     Answer:   Cardiac [6]       ACTIVITY  As tolerated.  Weight bearing as tolerated    CONSULTATIONS  Neurosurgery    PROCEDURES  None    LABORATORY  Lab Results   Component Value Date    SODIUM 142 11/05/2023    POTASSIUM 4.5 11/05/2023    CHLORIDE 105 11/05/2023    CO2 29 11/05/2023    GLUCOSE 147 (H) 11/05/2023    BUN 18 11/05/2023    CREATININE 0.95 11/05/2023    CREATININE 0.9 09/11/2008        Lab Results   Component Value Date    WBC 8.4 11/05/2023    HEMOGLOBIN 11.7 (L) 11/05/2023    HEMATOCRIT 37.9 11/05/2023    PLATELETCT 226 11/05/2023      MR-LUMBAR SPINE-W/O   Final Result      1.  L4-5 minor disc bulging. No central or foraminal stenosis.   2.  Mild hypertrophic facet arthropathy on the left at L5-S1.      CT-LSPINE W/O PLUS RECONS   Final Result      1.  Small central posterior disc protrusion at the L4-5 level with ligamentum flavum hypertrophy. There is mild central canal stenosis and neural foraminal narrowing.   2.  No acute bony process.      DX-CHEST-LIMITED (1 VIEW)   Final Result      1.  Questionable small left pleural effusion poorly evaluated related to patient body habitus.            Total time of the discharge process exceeds 45 minutes.

## 2023-11-07 ENCOUNTER — TELEPHONE (OUTPATIENT)
Dept: HEALTH INFORMATION MANAGEMENT | Facility: OTHER | Age: 48
End: 2023-11-07

## 2023-11-12 ENCOUNTER — APPOINTMENT (OUTPATIENT)
Dept: RADIOLOGY | Facility: MEDICAL CENTER | Age: 48
End: 2023-11-12
Attending: EMERGENCY MEDICINE
Payer: MEDICAID

## 2023-11-12 ENCOUNTER — HOSPITAL ENCOUNTER (EMERGENCY)
Facility: MEDICAL CENTER | Age: 48
End: 2023-11-12
Attending: EMERGENCY MEDICINE
Payer: MEDICAID

## 2023-11-12 ENCOUNTER — HOSPITAL ENCOUNTER (OUTPATIENT)
Dept: RADIOLOGY | Facility: MEDICAL CENTER | Age: 48
End: 2023-11-12
Attending: EMERGENCY MEDICINE

## 2023-11-12 VITALS
RESPIRATION RATE: 16 BRPM | DIASTOLIC BLOOD PRESSURE: 53 MMHG | SYSTOLIC BLOOD PRESSURE: 113 MMHG | WEIGHT: 275 LBS | TEMPERATURE: 97.9 F | HEART RATE: 63 BPM | OXYGEN SATURATION: 98 % | BODY MASS INDEX: 50.3 KG/M2

## 2023-11-12 DIAGNOSIS — R10.9 RIGHT FLANK PAIN: ICD-10-CM

## 2023-11-12 DIAGNOSIS — M54.50 LUMBAR BACK PAIN: ICD-10-CM

## 2023-11-12 DIAGNOSIS — R10.31 RIGHT LOWER QUADRANT PAIN: ICD-10-CM

## 2023-11-12 LAB
ALBUMIN SERPL BCP-MCNC: 4.3 G/DL (ref 3.2–4.9)
ALBUMIN/GLOB SERPL: 1.3 G/DL
ALP SERPL-CCNC: 135 U/L (ref 30–99)
ALT SERPL-CCNC: 19 U/L (ref 2–50)
ANION GAP SERPL CALC-SCNC: 10 MMOL/L (ref 7–16)
APPEARANCE UR: CLEAR
AST SERPL-CCNC: 14 U/L (ref 12–45)
BASOPHILS # BLD AUTO: 0.2 % (ref 0–1.8)
BASOPHILS # BLD: 0.04 K/UL (ref 0–0.12)
BILIRUB SERPL-MCNC: 0.7 MG/DL (ref 0.1–1.5)
BILIRUB UR QL STRIP.AUTO: NEGATIVE
BUN SERPL-MCNC: 22 MG/DL (ref 8–22)
CALCIUM ALBUM COR SERPL-MCNC: 9.2 MG/DL (ref 8.5–10.5)
CALCIUM SERPL-MCNC: 9.4 MG/DL (ref 8.5–10.5)
CHLORIDE SERPL-SCNC: 102 MMOL/L (ref 96–112)
CO2 SERPL-SCNC: 26 MMOL/L (ref 20–33)
COLOR UR: YELLOW
CREAT SERPL-MCNC: 0.88 MG/DL (ref 0.5–1.4)
EOSINOPHIL # BLD AUTO: 0.12 K/UL (ref 0–0.51)
EOSINOPHIL NFR BLD: 0.7 % (ref 0–6.9)
ERYTHROCYTE [DISTWIDTH] IN BLOOD BY AUTOMATED COUNT: 52.4 FL (ref 35.9–50)
GFR SERPLBLD CREATININE-BSD FMLA CKD-EPI: 80 ML/MIN/1.73 M 2
GLOBULIN SER CALC-MCNC: 3.4 G/DL (ref 1.9–3.5)
GLUCOSE SERPL-MCNC: 81 MG/DL (ref 65–99)
GLUCOSE UR STRIP.AUTO-MCNC: NEGATIVE MG/DL
HCT VFR BLD AUTO: 44.7 % (ref 37–47)
HGB BLD-MCNC: 13.9 G/DL (ref 12–16)
IMM GRANULOCYTES # BLD AUTO: 0.07 K/UL (ref 0–0.11)
IMM GRANULOCYTES NFR BLD AUTO: 0.4 % (ref 0–0.9)
KETONES UR STRIP.AUTO-MCNC: NEGATIVE MG/DL
LEUKOCYTE ESTERASE UR QL STRIP.AUTO: NEGATIVE
LYMPHOCYTES # BLD AUTO: 3.25 K/UL (ref 1–4.8)
LYMPHOCYTES NFR BLD: 20 % (ref 22–41)
MCH RBC QN AUTO: 26.9 PG (ref 27–33)
MCHC RBC AUTO-ENTMCNC: 31.1 G/DL (ref 32.2–35.5)
MCV RBC AUTO: 86.5 FL (ref 81.4–97.8)
MICRO URNS: NORMAL
MONOCYTES # BLD AUTO: 1.02 K/UL (ref 0–0.85)
MONOCYTES NFR BLD AUTO: 6.3 % (ref 0–13.4)
NEUTROPHILS # BLD AUTO: 11.76 K/UL (ref 1.82–7.42)
NEUTROPHILS NFR BLD: 72.4 % (ref 44–72)
NITRITE UR QL STRIP.AUTO: NEGATIVE
NRBC # BLD AUTO: 0 K/UL
NRBC BLD-RTO: 0 /100 WBC (ref 0–0.2)
PH UR STRIP.AUTO: 5.5 [PH] (ref 5–8)
PLATELET # BLD AUTO: 269 K/UL (ref 164–446)
PMV BLD AUTO: 11.2 FL (ref 9–12.9)
POTASSIUM SERPL-SCNC: 4.3 MMOL/L (ref 3.6–5.5)
PROT SERPL-MCNC: 7.7 G/DL (ref 6–8.2)
PROT UR QL STRIP: NEGATIVE MG/DL
RBC # BLD AUTO: 5.17 M/UL (ref 4.2–5.4)
RBC UR QL AUTO: NEGATIVE
SODIUM SERPL-SCNC: 138 MMOL/L (ref 135–145)
SP GR UR STRIP.AUTO: 1.02
UROBILINOGEN UR STRIP.AUTO-MCNC: 0.2 MG/DL
WBC # BLD AUTO: 16.3 K/UL (ref 4.8–10.8)

## 2023-11-12 PROCEDURE — 99284 EMERGENCY DEPT VISIT MOD MDM: CPT

## 2023-11-12 PROCEDURE — 700117 HCHG RX CONTRAST REV CODE 255: Mod: UD | Performed by: EMERGENCY MEDICINE

## 2023-11-12 PROCEDURE — 80053 COMPREHEN METABOLIC PANEL: CPT

## 2023-11-12 PROCEDURE — 74177 CT ABD & PELVIS W/CONTRAST: CPT

## 2023-11-12 PROCEDURE — 51702 INSERT TEMP BLADDER CATH: CPT

## 2023-11-12 PROCEDURE — 81003 URINALYSIS AUTO W/O SCOPE: CPT

## 2023-11-12 PROCEDURE — 96375 TX/PRO/DX INJ NEW DRUG ADDON: CPT | Mod: XU

## 2023-11-12 PROCEDURE — 36415 COLL VENOUS BLD VENIPUNCTURE: CPT

## 2023-11-12 PROCEDURE — 96374 THER/PROPH/DIAG INJ IV PUSH: CPT | Mod: XU

## 2023-11-12 PROCEDURE — 700111 HCHG RX REV CODE 636 W/ 250 OVERRIDE (IP): Mod: JZ,UD | Performed by: EMERGENCY MEDICINE

## 2023-11-12 PROCEDURE — 85025 COMPLETE CBC W/AUTO DIFF WBC: CPT

## 2023-11-12 PROCEDURE — 303105 HCHG CATHETER EXTRA

## 2023-11-12 RX ORDER — METHOCARBAMOL 500 MG/1
500 TABLET, FILM COATED ORAL 3 TIMES DAILY
Qty: 21 TABLET | Refills: 0 | Status: SHIPPED | OUTPATIENT
Start: 2023-11-12 | End: 2024-03-25

## 2023-11-12 RX ORDER — LIDOCAINE 4 G/G
1 PATCH TOPICAL EVERY 12 HOURS
Qty: 1 PATCH | Refills: 0 | Status: SHIPPED | OUTPATIENT
Start: 2023-11-12 | End: 2024-03-25

## 2023-11-12 RX ORDER — MORPHINE SULFATE 4 MG/ML
4 INJECTION INTRAVENOUS ONCE
Status: COMPLETED | OUTPATIENT
Start: 2023-11-12 | End: 2023-11-12

## 2023-11-12 RX ORDER — ONDANSETRON 2 MG/ML
4 INJECTION INTRAMUSCULAR; INTRAVENOUS ONCE
Status: COMPLETED | OUTPATIENT
Start: 2023-11-12 | End: 2023-11-12

## 2023-11-12 RX ORDER — IBUPROFEN 600 MG/1
600 TABLET ORAL EVERY 6 HOURS PRN
Qty: 30 TABLET | Refills: 0 | Status: SHIPPED | OUTPATIENT
Start: 2023-11-12 | End: 2024-01-17

## 2023-11-12 RX ORDER — ACETAMINOPHEN 500 MG
500-1000 TABLET ORAL EVERY 6 HOURS PRN
Qty: 30 TABLET | Refills: 0 | Status: SHIPPED | OUTPATIENT
Start: 2023-11-12 | End: 2024-03-25

## 2023-11-12 RX ADMIN — IOHEXOL 95 ML: 350 INJECTION, SOLUTION INTRAVENOUS at 21:00

## 2023-11-12 RX ADMIN — ONDANSETRON 4 MG: 2 INJECTION INTRAMUSCULAR; INTRAVENOUS at 19:21

## 2023-11-12 RX ADMIN — MORPHINE SULFATE 4 MG: 4 INJECTION, SOLUTION INTRAMUSCULAR; INTRAVENOUS at 19:21

## 2023-11-12 ASSESSMENT — FIBROSIS 4 INDEX: FIB4 SCORE: 0.81

## 2023-11-12 NOTE — ED TRIAGE NOTES
Lorene Haro  48 y.o.    Chief Complaint   Patient presents with    Back Pain     Pt BIBA for chronic back pain. Pt denies any new or recent trauma. Pt reports it has been difficult to walk however, she has felt this way for many months.        /69   Pulse 60   Temp 36.1 °C (97 °F) (Temporal)   Resp 20   Wt 125 kg (275 lb)   LMP  (LMP Unknown)   SpO2 97%   BMI 50.30 kg/m² '    Pt placed in the lobby, educated to alert staff with any changes or concerns

## 2023-11-13 NOTE — DISCHARGE INSTRUCTIONS
If you develop any numbness tingling in your groin loss of bowel or bladder control return to the ER right away if you develop any fevers lasting greater than 5 days return to the ER continue take Tylenol and ibuprofen as needed for pain, and the lidocaine patches as needed for pain, incidentally there was a small lesion noted on your kidney follow-up with your PCP, in regards to this

## 2023-11-13 NOTE — DISCHARGE SUMMARY
ED Observation Discharge Summary    Patient:Lorene Haro  Patient : 1975  Patient MRN: [id number]  Patient PCP: Pcp Not In Computer    Admit Date: 2023  Discharge Date and Time: 23 9:50 PM  Discharge Diagnosis: Right flank pain and back pain leukocytosis  Discharge Attending: Familia Herron D.O.  Discharge Service: ED Observation    ED Course  Lorene is a 48 y.o. female who was evaluated at [facility] patient presented for evaluation of intermittent right-sided flank pain.  Labs remarkable for leukocytosis no other sirs criteria lowering concern for underlying sepsis a CT abdomen pelvis was obtained which showed no acute intra-abdominal pathology, was a small 10 mm hypodensity in the left kidney incidental finding was discussed as well as appropriate follow-up.  Patient was also complaining of back pain denies any urinary tension loss of bowel or bladder control saddle anesthesias, reassuring exam in the ER lowering concern for underlying cauda equina space-occupying lesion epidural abscess epidural bleed. otherwise prior to discharge patient stated her pain had resolved, repeat abdominal exam is reassuring demonstrating no rebound guarding or peritonitis.  Return precautions were discussed and she was discharged in stable condition    Discharge Exam:  /53   Pulse 63   Temp 36.1 °C (97 °F) (Temporal)   Resp 15   Wt 125 kg (275 lb)   LMP  (LMP Unknown)   SpO2 99%   BMI 50.30 kg/m² .    Constitutional: Awake and alert. Nontoxic  HENT:  Grossly normal  Eyes: Grossly normal  Neck: Normal range of motion  Cardiovascular: Normal heart rate   Thorax & Lungs: No respiratory distress  Abdomen: Nontender  Skin:  No pathologic rash.   Extremities: Well perfused  Psychiatric: Affect normal    Labs  Results for orders placed or performed during the hospital encounter of 23   CBC WITH DIFFERENTIAL   Result Value Ref Range    WBC 16.3 (H) 4.8 - 10.8 K/uL    RBC 5.17 4.20 - 5.40 M/uL     Hemoglobin 13.9 12.0 - 16.0 g/dL    Hematocrit 44.7 37.0 - 47.0 %    MCV 86.5 81.4 - 97.8 fL    MCH 26.9 (L) 27.0 - 33.0 pg    MCHC 31.1 (L) 32.2 - 35.5 g/dL    RDW 52.4 (H) 35.9 - 50.0 fL    Platelet Count 269 164 - 446 K/uL    MPV 11.2 9.0 - 12.9 fL    Neutrophils-Polys 72.40 (H) 44.00 - 72.00 %    Lymphocytes 20.00 (L) 22.00 - 41.00 %    Monocytes 6.30 0.00 - 13.40 %    Eosinophils 0.70 0.00 - 6.90 %    Basophils 0.20 0.00 - 1.80 %    Immature Granulocytes 0.40 0.00 - 0.90 %    Nucleated RBC 0.00 0.00 - 0.20 /100 WBC    Neutrophils (Absolute) 11.76 (H) 1.82 - 7.42 K/uL    Lymphs (Absolute) 3.25 1.00 - 4.80 K/uL    Monos (Absolute) 1.02 (H) 0.00 - 0.85 K/uL    Eos (Absolute) 0.12 0.00 - 0.51 K/uL    Baso (Absolute) 0.04 0.00 - 0.12 K/uL    Immature Granulocytes (abs) 0.07 0.00 - 0.11 K/uL    NRBC (Absolute) 0.00 K/uL   COMP METABOLIC PANEL   Result Value Ref Range    Sodium 138 135 - 145 mmol/L    Potassium 4.3 3.6 - 5.5 mmol/L    Chloride 102 96 - 112 mmol/L    Co2 26 20 - 33 mmol/L    Anion Gap 10.0 7.0 - 16.0    Glucose 81 65 - 99 mg/dL    Bun 22 8 - 22 mg/dL    Creatinine 0.88 0.50 - 1.40 mg/dL    Calcium 9.4 8.5 - 10.5 mg/dL    Correct Calcium 9.2 8.5 - 10.5 mg/dL    AST(SGOT) 14 12 - 45 U/L    ALT(SGPT) 19 2 - 50 U/L    Alkaline Phosphatase 135 (H) 30 - 99 U/L    Total Bilirubin 0.7 0.1 - 1.5 mg/dL    Albumin 4.3 3.2 - 4.9 g/dL    Total Protein 7.7 6.0 - 8.2 g/dL    Globulin 3.4 1.9 - 3.5 g/dL    A-G Ratio 1.3 g/dL   URINALYSIS CULTURE, IF INDICATED    Specimen: Urine, Cath   Result Value Ref Range    Color Yellow     Character Clear     Specific Gravity 1.024 <1.035    Ph 5.5 5.0 - 8.0    Glucose Negative Negative mg/dL    Ketones Negative Negative mg/dL    Protein Negative Negative mg/dL    Bilirubin Negative Negative    Urobilinogen, Urine 0.2 Negative    Nitrite Negative Negative    Leukocyte Esterase Negative Negative    Occult Blood Negative Negative    Micro Urine Req see below    ESTIMATED GFR    Result Value Ref Range    GFR (CKD-EPI) 80 >60 mL/min/1.73 m 2       Radiology  CT-ABDOMEN-PELVIS WITH   Final Result      1.  Hepatosplenomegaly.   2.  Prior cholecystectomy.   3.  Normal appendix.   4.  No acute inflammatory abnormality.   5.  Indeterminate 10 mm mildly hypodense lesion in the left kidney. This is too small to accurately characterize.      OUTSIDE IMAGES-CT LUMBAR SPINE    (Results Pending)       Medications:   New Prescriptions    No medications on file       My final assessment includes right flank pain, SIRS, back pain  Upon Reevaluation, the patient's condition has: Improved; and will be discharged.    Patient discharged from ED Observation status at 2158 (Time) 11/12 (Date).     Total time spent on this ED Observation discharge encounter is < 30 Minutes    Electronically signed by: Familia Herron D.O., 11/12/2023 9:50 PM

## 2023-11-13 NOTE — ED NOTES
Pt signed and given d/c papers. Discussed multiple RX sent to pref pharmacy and f/u if needed. Reviewed negative/clear results after speaking w/ ERP. Pt denies further questions/concerns. Pt wheeled to lobby exit in stable condition, A&O x4 w/ all belongings to wait for ride  [facility] called for pt and ride is coming to take pt home

## 2023-11-13 NOTE — ED PROVIDER NOTES
"ED Provider Note    CHIEF COMPLAINT  Chief Complaint   Patient presents with    Back Pain     Pt BIBA for chronic back pain. Pt denies any new or recent trauma. Pt reports it has been difficult to walk however, she has felt this way for many months.        EXTERNAL RECORDS REVIEWED  Outpatient Notes stress test was performed 2 months ago which was unremarkable.  Patient was seen for chest discomfort in the emergency department on 10/19.  Patient had some slight shortness of breath, cough and some dizziness at that time.  Work showed a slight leukocytosis, no anemia, no gross electrolyte abnormalities.  Troponin that was detectable at 39, unchanged from prior draws.  It was no identifiable cause or vital sign abnormalities to suggest her leukocytosis had a infectious source.  He had a negative stress test within the last 2 months to prior encounter and with her heart score of 3 he was failure to have a appropriate follow-up in the outpatient setting.  She was amenable to this and discharged at that time    HPI/ROS  LIMITATION TO HISTORY   Select: : None  OUTSIDE HISTORIAN(S):  none    Lorene Haro is a 48 y.o. female with a past medical history of schizophrenia, paroxysmal A-fib, COPD, bipolar who presents from her group home complaining of right-sided flank discomfort with occasional radiation down to the right lower quadrant.  The patient states that this has been intermittently present over the course of several weeks but has not been as severe as it has been over the last several days.  She feels like it started in her right back both in the lower and right flank.  She has not had pain like this before and says that she was walking funny\" because of how painful it was.  He has since radiated down towards her groin.  She denies any prior history of this and has not noticed any dysuria or hematuria lately.  She denies any abdominal surgeries, ever chart review shows that she has had hysterectomy, colectomy and " colostomy cholecystectomy in the past.  This was confirmed with the patient at bedside.  Denies any fevers or chills, has not had any chest pain, shortness of breath or pain with deep inspiration.  No abdominal distention, diarrhea denies any groin discomfort, numbness or any lower leg pain.    PAST MEDICAL HISTORY   has a past medical history of A-fib ([facility]), MONI (acute kidney injury) ([facility]) (2023), Asthma, Bipolar 2 disorder (HCC), Chickenpox, Chronic obstructive pulmonary disease (HCC), Hypertension, On home oxygen therapy, Other psychological stress, Schizophrenic disorder ([facility]), and Yeast infection of the skin (2021).    SURGICAL HISTORY   has a past surgical history that includes hysterectomy laparoscopy; colectomy; cholecystectomy; and knee arthroscopy (Left).    FAMILY HISTORY  Family History   Problem Relation Age of Onset    No Known Problems Mother     Cancer Sister        SOCIAL HISTORY  Social History     Tobacco Use    Smoking status: Former     Current packs/day: 0.00     Types: Cigarettes     Quit date: 10/12/2022     Years since quittin.0    Smokeless tobacco: Never   Vaping Use    Vaping Use: Never used   Substance and Sexual Activity    Alcohol use: Not Currently    Drug use: Never    Sexual activity: Not Currently       CURRENT MEDICATIONS  Home Medications       Reviewed by Carlos Kent R.N. (Registered Nurse) on 23 at 1531  Med List Status: Partial     Medication Last Dose Status   ADVAIR DISKUS 100-50 MCG/ACT AEROSOL POWDER, BREATH ACTIVATED  Active   albuterol 108 (90 Base) MCG/ACT Aero Soln inhalation aerosol  Active   aripiprazole (ABILIFY) 30 MG tablet  Active   aspirin 81 MG EC tablet  Active   atorvastatin (LIPITOR) 20 MG Tab  Active   fluticasone (FLONASE) 50 MCG/ACT nasal spray  Active   furosemide (LASIX) 20 MG Tab  Active   gabapentin (NEURONTIN) 100 MG Cap  Active   lidocaine (LIDODERM) 5 % Patch  Active   methocarbamol (ROBAXIN) 500 MG Tab  Active    methylPREDNISolone (MEDROL DOSEPAK) 4 MG Tablet Therapy Pack  Active   montelukast (SINGULAIR) 10 MG Tab  Active   senna-docusate (PERICOLACE OR SENOKOT S) 8.6-50 MG Tab  Active                    ALLERGIES  Allergies   Allergen Reactions    Penicillin G Rash and Itching     pt reports that she gets a rash all over her body and gets itchy    Amoxicillin Rash and Itching     Tolerates cephalosporins, pt reports that she gets a rash all over her body and gets itchy       PHYSICAL EXAM  VITAL SIGNS: /69   Pulse 67   Temp 36.1 °C (97 °F) (Temporal)   Resp 20   Wt 125 kg (275 lb)   LMP  (LMP Unknown)   SpO2 91%   BMI 50.30 kg/m²    Genl: F sitting in chair comfortably, speaking clearly, appears in very mild distress   Head: NC/AT   ENT: Mucous membranes slightly dry, posterior pharynx clear, uvula midline, nares patent bilaterally   Eyes: Normal sclera, pupils equal round reactive to light  Neck: Supple, FROM, no LAD appreciated   Pulmonary: Lungs are clear to auscultation bilaterally  Chest: No TTP  CV:  RRR, no murmur appreciated, pulses 2+ in both upper and lower extremities,  Abdomen: soft, nonspecific flank discomfort, no true Mullins sign, no McBurney's point tenderness though she does have nonspecific right groin discomfort without evidence of mass.  ND; no rebound/guarding, no masses palpated, no HSM   : no CVA or suprapubic tenderness   Musculoskeletal: Pain free ROM of the neck. Moving upper and lower extremities in spontaneous and coordinated fashion  Neuro: A&Ox4 (person, place, time, situation), speech fluent, gait steady, no focal deficits appreciated  Skin: No rash or lesions.  No pallor or jaundice.  No cyanosis.  Warm and dry.     DIAGNOSTIC STUDIES / PROCEDURES  LABS  Labs Reviewed   CBC WITH DIFFERENTIAL - Abnormal; Notable for the following components:       Result Value    WBC 16.3 (*)     MCH 26.9 (*)     MCHC 31.1 (*)     RDW 52.4 (*)     Neutrophils-Polys 72.40 (*)     Lymphocytes  20.00 (*)     Neutrophils (Absolute) 11.76 (*)     Monos (Absolute) 1.02 (*)     All other components within normal limits   COMP METABOLIC PANEL - Abnormal; Notable for the following components:    Alkaline Phosphatase 135 (*)     All other components within normal limits   URINALYSIS,CULTURE IF INDICATED    Narrative:     Indication for culture:->Unexplained new onset of Flank pain  and/or Costovertebral angle tenderness  Release to patient->Immediate   ESTIMATED GFR     RADIOLOGY  I have independently interpreted the diagnostic imaging associated with this visit and am waiting the final reading from the radiologist.   My preliminary interpretation is as follows: Interpretation  Radiologist interpretation:   OUTSIDE IMAGES-CT LUMBAR SPINE    (Results Pending)   CT-ABDOMEN-PELVIS WITH    (Results Pending)     COURSE & MEDICAL DECISION MAKING    ED Observation Status? Yes; I am placing the patient in to an observation status due to a diagnostic uncertainty as well as therapeutic intensity. Patient placed in observation status at 8:07 PM, 11/12/2023.     Observation plan is as follows: Patient is in ED observation status as she has been here greater than 4 hours and her CT scans of her abdomen pelvis and lower back have been delayed by a multitude of traumatic patients that are coming to the department.  Currently the plan is for evaluation of her possible infectious etiology as she has a white count of 16 with no vital sign abnormalities and right-sided flank discomfort and no evidence of infection on urinalysis.    Follow-up with signout physician for ultimate disposition.    INITIAL ASSESSMENT, COURSE AND PLAN  Nephrolithiasis  Ovarian cyst  Cholecystitis  Appendicitis  Diverticulitis  Neprolithiasis  UTI/Cystitis  pyelonephritis    Care Narrative: Seen and evaluated for symptoms as described above.  The patient had flank discomfort and low back discomfort without a traumatic mechanism, no vital sign abnormalities  and a somewhat vague distribution of symptoms with no specific elicitation of pain on clinical exam.  She has no developing bruising, no groin numbness, no bowel or bladder incontinence, no perceived weakness though she has a lot of referred pain to the right side of her flank and now towards her right groin.  She has a fairly distractible exam however with the distribution of pain and discomfort I do believe formal imaging would be necessary in addition to IV assessment of the broad differential as noted above and urinalysis.    I came back showing no evidence of inflammatory changes or infectious etiology.  This could be representative of an obstructing stone, unlikely to be pyelonephritis.    Patient has a leukocytosis of 16, leftward shift with no bandemia.  Slight monocyte predominance and no MONI, no LFT elevations and no gross metabolic derangement.    CT scan was ordered of both the lower back and abdomen pelvis with contrast however her CT scans were delayed secondary to a multitude of traumatic patients that arrived to the emergency department.  Patient was placed in ED observation status pending these results and signed out to the oncoming physician.  Current plan is for reassessment of the patient's pain, follow-up with the CT scans and likely disposition home unless further surgical intervention or specialty consultation is needed.    FINAL DIAGNOSIS  1. Lumbar back pain    2. Right flank pain    3. Right lower quadrant pain         Electronically signed by: Geronimo Spivey M.D., 11/12/2023 5:46 PM

## 2023-11-24 ENCOUNTER — APPOINTMENT (OUTPATIENT)
Dept: RADIOLOGY | Facility: MEDICAL CENTER | Age: 48
End: 2023-11-24
Attending: EMERGENCY MEDICINE
Payer: MEDICAID

## 2023-11-24 ENCOUNTER — HOSPITAL ENCOUNTER (EMERGENCY)
Facility: MEDICAL CENTER | Age: 48
End: 2023-11-24
Attending: EMERGENCY MEDICINE
Payer: MEDICAID

## 2023-11-24 VITALS
BODY MASS INDEX: 50.71 KG/M2 | TEMPERATURE: 98.8 F | SYSTOLIC BLOOD PRESSURE: 137 MMHG | DIASTOLIC BLOOD PRESSURE: 60 MMHG | HEIGHT: 62 IN | HEART RATE: 64 BPM | OXYGEN SATURATION: 96 % | WEIGHT: 275.57 LBS | RESPIRATION RATE: 13 BRPM

## 2023-11-24 DIAGNOSIS — E66.01 MORBID OBESITY (HCC): ICD-10-CM

## 2023-11-24 DIAGNOSIS — Z99.81 DEPENDENCE ON SUPPLEMENTAL OXYGEN: ICD-10-CM

## 2023-11-24 DIAGNOSIS — R06.02 SOB (SHORTNESS OF BREATH): Primary | ICD-10-CM

## 2023-11-24 LAB
ALBUMIN SERPL BCP-MCNC: 3.9 G/DL (ref 3.2–4.9)
ALBUMIN/GLOB SERPL: 1.3 G/DL
ALP SERPL-CCNC: 122 U/L (ref 30–99)
ALT SERPL-CCNC: 16 U/L (ref 2–50)
ANION GAP SERPL CALC-SCNC: 9 MMOL/L (ref 7–16)
AST SERPL-CCNC: 10 U/L (ref 12–45)
BASOPHILS # BLD AUTO: 0.2 % (ref 0–1.8)
BASOPHILS # BLD: 0.02 K/UL (ref 0–0.12)
BILIRUB SERPL-MCNC: 0.4 MG/DL (ref 0.1–1.5)
BUN SERPL-MCNC: 15 MG/DL (ref 8–22)
CALCIUM ALBUM COR SERPL-MCNC: 9.1 MG/DL (ref 8.5–10.5)
CALCIUM SERPL-MCNC: 9 MG/DL (ref 8.5–10.5)
CHLORIDE SERPL-SCNC: 103 MMOL/L (ref 96–112)
CO2 SERPL-SCNC: 26 MMOL/L (ref 20–33)
CREAT SERPL-MCNC: 1.24 MG/DL (ref 0.5–1.4)
EKG IMPRESSION: NORMAL
EOSINOPHIL # BLD AUTO: 0.13 K/UL (ref 0–0.51)
EOSINOPHIL NFR BLD: 1.2 % (ref 0–6.9)
ERYTHROCYTE [DISTWIDTH] IN BLOOD BY AUTOMATED COUNT: 51.8 FL (ref 35.9–50)
FLUAV RNA SPEC QL NAA+PROBE: NEGATIVE
FLUBV RNA SPEC QL NAA+PROBE: NEGATIVE
GFR SERPLBLD CREATININE-BSD FMLA CKD-EPI: 53 ML/MIN/1.73 M 2
GLOBULIN SER CALC-MCNC: 2.9 G/DL (ref 1.9–3.5)
GLUCOSE SERPL-MCNC: 167 MG/DL (ref 65–99)
HCT VFR BLD AUTO: 38 % (ref 37–47)
HGB BLD-MCNC: 11.8 G/DL (ref 12–16)
IMM GRANULOCYTES # BLD AUTO: 0.04 K/UL (ref 0–0.11)
IMM GRANULOCYTES NFR BLD AUTO: 0.4 % (ref 0–0.9)
LYMPHOCYTES # BLD AUTO: 1.54 K/UL (ref 1–4.8)
LYMPHOCYTES NFR BLD: 14.2 % (ref 22–41)
MCH RBC QN AUTO: 26.6 PG (ref 27–33)
MCHC RBC AUTO-ENTMCNC: 31.1 G/DL (ref 32.2–35.5)
MCV RBC AUTO: 85.8 FL (ref 81.4–97.8)
MONOCYTES # BLD AUTO: 0.44 K/UL (ref 0–0.85)
MONOCYTES NFR BLD AUTO: 4.1 % (ref 0–13.4)
NEUTROPHILS # BLD AUTO: 8.65 K/UL (ref 1.82–7.42)
NEUTROPHILS NFR BLD: 79.9 % (ref 44–72)
NRBC # BLD AUTO: 0 K/UL
NRBC BLD-RTO: 0 /100 WBC (ref 0–0.2)
NT-PROBNP SERPL IA-MCNC: 211 PG/ML (ref 0–125)
PLATELET # BLD AUTO: 233 K/UL (ref 164–446)
PMV BLD AUTO: 11.4 FL (ref 9–12.9)
POTASSIUM SERPL-SCNC: 3.9 MMOL/L (ref 3.6–5.5)
PROT SERPL-MCNC: 6.8 G/DL (ref 6–8.2)
RBC # BLD AUTO: 4.43 M/UL (ref 4.2–5.4)
RSV RNA SPEC QL NAA+PROBE: NEGATIVE
SARS-COV-2 RNA RESP QL NAA+PROBE: NOTDETECTED
SODIUM SERPL-SCNC: 138 MMOL/L (ref 135–145)
TROPONIN T SERPL-MCNC: 38 NG/L (ref 6–19)
WBC # BLD AUTO: 10.8 K/UL (ref 4.8–10.8)

## 2023-11-24 PROCEDURE — 36415 COLL VENOUS BLD VENIPUNCTURE: CPT

## 2023-11-24 PROCEDURE — 71045 X-RAY EXAM CHEST 1 VIEW: CPT

## 2023-11-24 PROCEDURE — 93005 ELECTROCARDIOGRAM TRACING: CPT | Performed by: EMERGENCY MEDICINE

## 2023-11-24 PROCEDURE — 85025 COMPLETE CBC W/AUTO DIFF WBC: CPT

## 2023-11-24 PROCEDURE — 80053 COMPREHEN METABOLIC PANEL: CPT

## 2023-11-24 PROCEDURE — 84484 ASSAY OF TROPONIN QUANT: CPT

## 2023-11-24 PROCEDURE — C9803 HOPD COVID-19 SPEC COLLECT: HCPCS

## 2023-11-24 PROCEDURE — 99284 EMERGENCY DEPT VISIT MOD MDM: CPT

## 2023-11-24 PROCEDURE — 0241U HCHG SARS-COV-2 COVID-19 NFCT DS RESP RNA 4 TRGT ED POC: CPT

## 2023-11-24 PROCEDURE — 83880 ASSAY OF NATRIURETIC PEPTIDE: CPT

## 2023-11-24 ASSESSMENT — FIBROSIS 4 INDEX
FIB4 SCORE: 0.6
FIB4 SCORE: 0.6

## 2023-11-24 ASSESSMENT — PAIN DESCRIPTION - PAIN TYPE: TYPE: CHRONIC PAIN

## 2023-11-25 NOTE — ED NOTES
Bedside report received from off going RN: Louie, assumed care of patient.  POC discussed with patient. Call light within reach, all needs addressed at this time.       Fall risk interventions in place: Patient's personal possessions are with in their safe reach and Keep floor surfaces clean and dry (all applicable per [place] Fall risk assessment)   Continuous monitoring: Cardiac Leads, Pulse Ox, or Blood Pressure  IVF/IV medications: Not Applicable   Oxygen: How many liters 2L and Traced the line to wall oxygen  Bedside sitter: Not Applicable   Isolation: Not Applicable

## 2023-11-25 NOTE — ED NOTES
IV access placed. Blood drawn and sent to lab. JC Mccallum at bedside for EKG. NAD. VSS. Appropriate fall precautions in place. RA. Call light within reach.

## 2023-11-25 NOTE — DISCHARGE INSTRUCTIONS
Thankfully your work-up today was negative.  Also follow-up with your outpatient team for further evaluation treatment.  Continue take your Lasix as directed.  Come back if worsening symptoms.  Thank coming today.

## 2023-11-25 NOTE — ED NOTES
Discharge instructions reviewed with patient and signed. IV was removed from arm. They verbalized understanding of follow up instructions. All belongings with patient. Assisted out of ED with wheelchair, pt to call [facility] for ride back to group home.

## 2023-11-25 NOTE — ED TRIAGE NOTES
"Chief Complaint   Patient presents with    Shortness of Breath     Pt states SOB with productive cough x4 days. Sputum noted to be yellow. O2 95% on RA. AO x4. GCS15.     Cough     BIB EMS for above. Patient states history of COPD and heart failure.       BP (P) 127/60   Pulse (P) 73   Temp (P) 37 °C (98.6 °F) (Temporal)   Resp (P) 18   Ht (P) 1.575 m (5' 2\")   Wt (P) 125 kg (275 lb 9.2 oz)   LMP  (LMP Unknown)   SpO2 (P) 92%   BMI (P) 50.40 kg/m²     "

## 2023-11-25 NOTE — ED PROVIDER NOTES
ED Provider Note    Scribed for Nader Wilson by Chente Langford. 11/24/2023  6:44 PM    Primary care provider: Pcp Not In Computer  Means of arrival: EMS  History obtained from: Patient  History limited by: None    CHIEF COMPLAINT  Chief Complaint   Patient presents with    Shortness of Breath     Pt states SOB with productive cough x4 days. Sputum noted to be yellow. O2 95% on RA. AO x4. GCS15.     Cough     EXTERNAL RECORDS REVIEWED  Other Patient has a history of proximal A-fib, COPD, Schizophrenia, Morbid obesity, Chronic back pain, and is seen frequently in emergency departments. She had a recent stress test, which was unremarkable.        HPI/ROS  LIMITATION TO HISTORY   Select: : None  OUTSIDE HISTORIAN(S):  None.    HPI  Lorenesamina Haro is a 48 y.o. female who presents to the Emergency Department for evaluation of shortness of breath onset four days ago. Patient reports associated productive cough, with yellow sputum production. Patient is on 2L oxygen at her baseline at home, and was not given oxygen while en rout today. She has been on oxygen for the past couple of years. She reports having some nausea with the onset of her symptoms and one episode of vomiting. She adds that she has some chest pressure at this time, and denies any diarrhea. Patient has a history of lower extremity swelling, and denies any recent increase in leg swelling. Patient quit smoking tobacco two months ago. She denies any known sick contacts.       REVIEW OF SYSTEMS  As above, all other systems reviewed and are negative.   See HPI for further details.     PAST MEDICAL HISTORY   has a past medical history of A-fib ([facility]), MONI (acute kidney injury) ([facility]) (8/9/2023), Asthma, Bipolar 2 disorder ([facility]), Chickenpox, Chronic obstructive pulmonary disease ([facility]), Hypertension, On home oxygen therapy, Other psychological stress, Schizophrenic disorder ([facility]), and Yeast infection of the skin (04/01/2021).  SURGICAL HISTORY   has a past  surgical history that includes hysterectomy laparoscopy; colectomy; cholecystectomy; and knee arthroscopy (Left).  SOCIAL HISTORY  Social History     Tobacco Use    Smoking status: Former     Current packs/day: 0.00     Types: Cigarettes     Quit date: 10/12/2022     Years since quittin.1    Smokeless tobacco: Never   Vaping Use    Vaping Use: Never used   Substance Use Topics    Alcohol use: Not Currently    Drug use: Never      Social History     Substance and Sexual Activity   Drug Use Never     FAMILY HISTORY  Family History   Problem Relation Age of Onset    No Known Problems Mother     Cancer Sister      CURRENT MEDICATIONS  Home Medications       Reviewed by Louie Smith, Student (Nurse Apprentice) on 23 at 1825  Med List Status: Partial     Medication Last Dose Status   acetaminophen (TYLENOL) 500 MG Tab  Active   ADVAIR DISKUS 100-50 MCG/ACT AEROSOL POWDER, BREATH ACTIVATED  Active   albuterol 108 (90 Base) MCG/ACT Aero Soln inhalation aerosol  Active   aripiprazole (ABILIFY) 30 MG tablet  Active   aspirin 81 MG EC tablet  Active   atorvastatin (LIPITOR) 20 MG Tab  Active   fluticasone (FLONASE) 50 MCG/ACT nasal spray  Active   furosemide (LASIX) 20 MG Tab  Active   gabapentin (NEURONTIN) 100 MG Cap  Active   ibuprofen (MOTRIN) 600 MG Tab  Active   Lidocaine 4 % Patch  Active   methocarbamol (ROBAXIN) 500 MG Tab  Active   methylPREDNISolone (MEDROL DOSEPAK) 4 MG Tablet Therapy Pack  Active   montelukast (SINGULAIR) 10 MG Tab  Active                  ALLERGIES  Allergies   Allergen Reactions    Penicillin G Rash and Itching     pt reports that she gets a rash all over her body and gets itchy    Amoxicillin Rash and Itching     Tolerates cephalosporins, pt reports that she gets a rash all over her body and gets itchy       PHYSICAL EXAM    VITAL SIGNS:   Vitals:    23 1823 23 1824 23   BP:  127/60 115/53 137/60   Pulse:  73 69 64   Resp:  18 12 13   Temp:  37 °C  "(98.6 °F)  37.1 °C (98.8 °F)   TempSrc:  Temporal  Temporal   SpO2:  92% 97% 96%   Weight: 125 kg (275 lb 9.2 oz)      Height: 1.575 m (5' 2\")          Vitals: My interpretation: normotensive, not tachycardic, afebrile, not hypoxic    Reinterpretation of vitals: Unchanged unremarkable    Cardiac Monitor Interpretation: The cardiac monitor revealed normal Sinus Rhythm as interpreted by me. The cardiac monitor was ordered secondary to the patient's history of chest discomfort/shortness of breath and to monitor for dysrhythmia and/or tachycardia.    PE:   Gen: sitting comfortably, speaking clearly, appears in no acute distress , Morbidly obese.  ENT: Mucous membranes moist, posterior pharynx clear, uvula midline, nares patent bilaterally   Neck: Supple, FROM  Pulmonary: Lungs are clear to auscultation bilaterally. No tachypnea  CV:  RRR, no murmur appreciated, pulses 2+ in both upper and lower extremities  Abdomen: soft, NT/ND; no rebound/guarding  : no CVA or suprapubic tenderness   Extremities: Scant venostasis swelling of lower extremities  Neuro: A&Ox4 (person, place, time, situation), speech fluent, gait steady, no focal deficits appreciated  Skin: No rash or lesions.  No pallor or jaundice.  No cyanosis.  Warm and dry.     DIAGNOSTIC STUDIES / PROCEDURES    LABS  Results for orders placed or performed during the hospital encounter of 11/24/23   CBC with Differential   Result Value Ref Range    WBC 10.8 4.8 - 10.8 K/uL    RBC 4.43 4.20 - 5.40 M/uL    Hemoglobin 11.8 (L) 12.0 - 16.0 g/dL    Hematocrit 38.0 37.0 - 47.0 %    MCV 85.8 81.4 - 97.8 fL    MCH 26.6 (L) 27.0 - 33.0 pg    MCHC 31.1 (L) 32.2 - 35.5 g/dL    RDW 51.8 (H) 35.9 - 50.0 fL    Platelet Count 233 164 - 446 K/uL    MPV 11.4 9.0 - 12.9 fL    Neutrophils-Polys 79.90 (H) 44.00 - 72.00 %    Lymphocytes 14.20 (L) 22.00 - 41.00 %    Monocytes 4.10 0.00 - 13.40 %    Eosinophils 1.20 0.00 - 6.90 %    Basophils 0.20 0.00 - 1.80 %    Immature Granulocytes " 0.40 0.00 - 0.90 %    Nucleated RBC 0.00 0.00 - 0.20 /100 WBC    Neutrophils (Absolute) 8.65 (H) 1.82 - 7.42 K/uL    Lymphs (Absolute) 1.54 1.00 - 4.80 K/uL    Monos (Absolute) 0.44 0.00 - 0.85 K/uL    Eos (Absolute) 0.13 0.00 - 0.51 K/uL    Baso (Absolute) 0.02 0.00 - 0.12 K/uL    Immature Granulocytes (abs) 0.04 0.00 - 0.11 K/uL    NRBC (Absolute) 0.00 K/uL   Complete Metabolic Panel (CMP)   Result Value Ref Range    Sodium 138 135 - 145 mmol/L    Potassium 3.9 3.6 - 5.5 mmol/L    Chloride 103 96 - 112 mmol/L    Co2 26 20 - 33 mmol/L    Anion Gap 9.0 7.0 - 16.0    Glucose 167 (H) 65 - 99 mg/dL    Bun 15 8 - 22 mg/dL    Creatinine 1.24 0.50 - 1.40 mg/dL    Calcium 9.0 8.5 - 10.5 mg/dL    Correct Calcium 9.1 8.5 - 10.5 mg/dL    AST(SGOT) 10 (L) 12 - 45 U/L    ALT(SGPT) 16 2 - 50 U/L    Alkaline Phosphatase 122 (H) 30 - 99 U/L    Total Bilirubin 0.4 0.1 - 1.5 mg/dL    Albumin 3.9 3.2 - 4.9 g/dL    Total Protein 6.8 6.0 - 8.2 g/dL    Globulin 2.9 1.9 - 3.5 g/dL    A-G Ratio 1.3 g/dL   Troponins NOW   Result Value Ref Range    Troponin T 38 (H) 6 - 19 ng/L   proBrain Natriuretic Peptide, NT   Result Value Ref Range    NT-proBNP 211 (H) 0 - 125 pg/mL   ESTIMATED GFR   Result Value Ref Range    GFR (CKD-EPI) 53 (A) >60 mL/min/1.73 m 2   EKG   Result Value Ref Range    Report       [facility] Emergency Dept.    Test Date:  2023  Pt Name:    ADELINA MILLAN                Department: ER  MRN:        [id number]                      Room:        39  Gender:     Female                       Technician: [id number]  :        [id number]                   Requested By:ER TRIAGE PROTOCOL  Order #:    [id number]                    Reading MD: Nader Wilson    Measurements  Intervals                                Axis  Rate:       67                           P:          45  CA:         138                          QRS:        36  QRSD:       111                          T:          -1  QT:         383  QTc:         405    Interpretive Statements  Sinus rhythm  Low voltage, precordial leads  RSR' in V1 or V2, right VCD or RVH  Compared to ECG 11/03/2023 21:05:46  Right ventricular hypertrophy now present  RSR' in V1 or V2 now present  T-wave abnormality no longer present  Electronically Signed On 11- 19:33:09 PST by Nader Wilson     POC CoV-2, FLU A/B, RSV by PCR   Result Value Ref Range    POC Influenza A RNA, PCR Negative Negative    POC Influenza B RNA, PCR Negative Negative    POC RSV, by PCR Negative Negative    POC SARS-CoV-2, PCR NotDetected       All labs reviewed by me. Labs were compared to prior labs if they were available. Significant for no leukocytosis, no anemia, normal electrolytes, mild hyperglycemia, normal renal function, normal liver enzymes, normal bilirubin, initial troponin 38 which is patient's baseline, BMP unremarkable.  Viral panel shows native.    RADIOLOGY  I have independently interpreted the diagnostic imaging associated with this visit and am waiting the final reading from the radiologist.   My preliminary interpretation is a follows: No focal consolidative process, baseline cardiomegaly  Radiologist interpretation is as follows:  DX-CHEST-PORTABLE (1 VIEW)   Final Result      Possible mild vascular congestion.      No significant consolidation or pleural effusion.        COURSE & MEDICAL DECISION MAKING  Nursing notes, VS, PMSFHx, labs, imaging, EKG reviewed in chart.    Heart Score: Moderate    ED Observation Status? No; Patient does not meet criteria for ED Observation.     Ddx: PE, MI, CHF, pneumonia, COPD exacerbation    MDM: 6:45 PM Lorene Haro is a 48 y.o. female who presented with acute on chronic shortness of breath.  Patient has multiple chronic comorbidities.  She is morbidly obese, hypoventilatory hypoxic respiratory syndrome chronically, chronically dependent on 2 to 3 L baseline nasal cannula oxygen.  She states she had a few days of worsening shortness of  breath and occasional cough productive of yellow sputum.  She arrives here for further evaluation.  Her vital signs unremarkable she is afebrile denies a history of fever.  Her exam shows that she seems to be at baseline, no significant fluid overload, lungs are clear to auscultation and she is not hypoxic on her baseline 2 L of nasal cannula oxygen.  Initiated work-up here for further evaluation and treatment including chest x-ray, EKG and laboratory evaluation.  Chest x-ray is fairly unremarkable and unchanged on my independent interpretation, radiologist states that there is possible mild vascular congestion.  EKG without ischemic changes showing normal sinus rhythm, no signs of arrhythmia. All labs reviewed by me. Labs were compared to prior labs if they were available. Significant for no leukocytosis, no anemia, normal electrolytes, mild hyperglycemia, normal renal function, normal liver enzymes, normal bilirubin, initial troponin 38 which is patient's baseline, BMP unremarkable.  Viral panel shows negative.  Overall no overt signs of acute bacterial process, pneumonia or infection.  No occasion for antibiotics.  Troponin at baseline.  EKG without ischemia.  Overall patient is appropriate for outpatient management and continued conservative treatment at home for likely viral syndrome.  She will return if symptoms worsen.    ADDITIONAL PROBLEM LIST AND DISPOSITION    I have discussed management of the patient with the following physicians and JAY's: None    Discussion of management with other QHP or appropriate source(s): None     Escalation of care considered, and ultimately not performed:acute inpatient care management, however at this time, the patient is most appropriate for outpatient management    Barriers to care at this time, including but not limited to: Patient does not have established PCP.     Decision tools and prescription drugs considered including, but not limited to: Pain Medications Tylenol  Motrin as needed .    FINAL IMPRESSION  1. SOB (shortness of breath) Acute   2. Dependence on supplemental oxygen Acute   3. Morbid obesity (HCC) Acute      IChente (Scribe), am scribing for, and in the presence of, Nader Wilson.    Electronically signed by: Chente Langford (Scribe), 11/24/2023    INader personally performed the services described in this documentation, as scribed by Chente Langford in my presence, and it is both accurate and complete.    The note accurately reflects work and decisions made by me.  Nader Wilson  11/24/2023  7:34 PM

## 2023-11-28 ENCOUNTER — TELEPHONE (OUTPATIENT)
Dept: CARDIOLOGY | Facility: MEDICAL CENTER | Age: 48
End: 2023-11-28
Payer: MEDICAID

## 2023-11-28 ENCOUNTER — NON-PROVIDER VISIT (OUTPATIENT)
Dept: CARDIOLOGY | Facility: MEDICAL CENTER | Age: 48
End: 2023-11-28
Payer: MEDICAID

## 2023-11-28 PROCEDURE — 93298 REM INTERROG DEV EVAL SCRMS: CPT | Performed by: INTERNAL MEDICINE

## 2023-11-28 NOTE — TELEPHONE ENCOUNTER
Remote transmission received via home monitor, full report scanned into Media.    Pause Episode  -Episode Onset: 11/27/2023 @ 8:32 AM  - Duration: Approx 6 seconds

## 2023-11-29 ENCOUNTER — OFFICE VISIT (OUTPATIENT)
Dept: CARDIOLOGY | Facility: MEDICAL CENTER | Age: 48
End: 2023-11-29
Attending: STUDENT IN AN ORGANIZED HEALTH CARE EDUCATION/TRAINING PROGRAM
Payer: MEDICAID

## 2023-11-29 ENCOUNTER — APPOINTMENT (OUTPATIENT)
Dept: CARDIOLOGY | Facility: MEDICAL CENTER | Age: 48
End: 2023-11-29
Attending: PHYSICIAN ASSISTANT
Payer: MEDICAID

## 2023-11-29 VITALS
HEART RATE: 62 BPM | RESPIRATION RATE: 16 BRPM | DIASTOLIC BLOOD PRESSURE: 56 MMHG | WEIGHT: 276 LBS | OXYGEN SATURATION: 99 % | HEIGHT: 61 IN | SYSTOLIC BLOOD PRESSURE: 112 MMHG | BODY MASS INDEX: 52.11 KG/M2

## 2023-11-29 DIAGNOSIS — E78.2 MIXED HYPERLIPIDEMIA: ICD-10-CM

## 2023-11-29 DIAGNOSIS — I51.7 CARDIOMEGALY: ICD-10-CM

## 2023-11-29 DIAGNOSIS — E66.01 CLASS 3 SEVERE OBESITY DUE TO EXCESS CALORIES WITH BODY MASS INDEX (BMI) OF 50.0 TO 59.9 IN ADULT, UNSPECIFIED WHETHER SERIOUS COMORBIDITY PRESENT (HCC): ICD-10-CM

## 2023-11-29 DIAGNOSIS — R07.9 CHEST PAIN, UNSPECIFIED TYPE: ICD-10-CM

## 2023-11-29 DIAGNOSIS — G47.33 OSA (OBSTRUCTIVE SLEEP APNEA): ICD-10-CM

## 2023-11-29 DIAGNOSIS — R60.0 LOWER EXTREMITY EDEMA: ICD-10-CM

## 2023-11-29 DIAGNOSIS — I48.0 PAROXYSMAL ATRIAL FIBRILLATION (HCC): ICD-10-CM

## 2023-11-29 DIAGNOSIS — E66.2 MORBID OBESITY WITH ALVEOLAR HYPOVENTILATION (HCC): ICD-10-CM

## 2023-11-29 DIAGNOSIS — F20.3 UNDIFFERENTIATED SCHIZOPHRENIA (HCC): ICD-10-CM

## 2023-11-29 PROCEDURE — 99213 OFFICE O/P EST LOW 20 MIN: CPT | Performed by: NURSE PRACTITIONER

## 2023-11-29 PROCEDURE — 99214 OFFICE O/P EST MOD 30 MIN: CPT | Performed by: NURSE PRACTITIONER

## 2023-11-29 PROCEDURE — 3078F DIAST BP <80 MM HG: CPT | Performed by: NURSE PRACTITIONER

## 2023-11-29 PROCEDURE — 3074F SYST BP LT 130 MM HG: CPT | Performed by: NURSE PRACTITIONER

## 2023-11-29 ASSESSMENT — ENCOUNTER SYMPTOMS
SPUTUM PRODUCTION: 1
LOSS OF CONSCIOUSNESS: 0
COUGH: 1
SHORTNESS OF BREATH: 0
ORTHOPNEA: 0
PALPITATIONS: 0
DIZZINESS: 0

## 2023-11-29 ASSESSMENT — FIBROSIS 4 INDEX: FIB4 SCORE: 0.54

## 2023-11-29 NOTE — CARDIAC REMOTE MONITOR - SCAN
Device transmission reviewed. Device demonstrated appropriate function. 6 second pause detected.      Electronically Signed by: Tyrell Solomon M.D.    12/19/2023  9:20 AM

## 2023-11-29 NOTE — PROGRESS NOTES
"Chief Complaint   Patient presents with    Atrial Fibrillation     Hospital follow up        Subjective     Lorene Haro is a 47 y.o. female who presents today for follow-up after recent hospitalization.      Patient has a medical history significant for palpitations, implanted ILR, found to have low burden of A-fib, [id number] of only 1, briefly on OAC but transition to ASA during last visit with Dr. Barraza, hypertension and schizophrenia. Last seen by myself in clinic on 8/11/2023.    Today in clinic patient has no complaints of chest pain, reports that her shortness of breath has improved since her cough and sputum production has decreased.  Is complaining that her left ear is \"plugged.\"  Since her last visit patient has been hospitalized a few times for her back pain and then more recently for shortness of breath found to have a suspected respiratory virus.  She is followed by pulmonology however has had difficulty getting her CPAP, most recently saw them in May and was supposed to follow-up with [facility] however still has not received her CPAP.  She did successfully undergo removal of the majority of her teeth with good relief to mouth pain.  Earlier this week her ILR noted that she had a 6-second pause while she was asleep, most likely the result of untreated sleep apnea.     Past Medical History:   Diagnosis Date    A-fib (HCC)     MONI (acute kidney injury) (HCC) 8/9/2023    Asthma     Bipolar 2 disorder (HCC)     Chickenpox     Chronic obstructive pulmonary disease (HCC)     Hypertension     On home oxygen therapy     Other psychological stress     Schizophrenic disorder (HCC)     Yeast infection of the skin 04/01/2021     Past Surgical History:   Procedure Laterality Date    CHOLECYSTECTOMY      COLECTOMY      HYSTERECTOMY LAPAROSCOPY      KNEE ARTHROSCOPY Left      Family History   Problem Relation Age of Onset    No Known Problems Mother     Cancer Sister      Social History     Socioeconomic History "    Marital status: Single     Spouse name: Not on file    Number of children: Not on file    Years of education: Not on file    Highest education level: Not on file   Occupational History    Not on file   Tobacco Use    Smoking status: Former     Current packs/day: 0.00     Types: Cigarettes     Quit date: 10/12/2022     Years since quittin.1    Smokeless tobacco: Never   Vaping Use    Vaping Use: Never used   Substance and Sexual Activity    Alcohol use: Not Currently    Drug use: Never    Sexual activity: Not Currently   Other Topics Concern    Not on file   Social History Narrative    ** Merged History Encounter **         From [facility]     Financial Resource Strain: Medium Risk (2022)    Overall Financial Resource Strain (CARDIA)     Difficulty of Paying Living Expenses: Somewhat hard   Food Insecurity: No Food Insecurity (8/3/2023)    Hunger Vital Sign     Worried About Running Out of Food in the Last Year: Never true     Ran Out of Food in the Last Year: Never true   Transportation Needs: No Transportation Needs (2022)    PRAPARE - Transportation     Lack of Transportation (Medical): No     Lack of Transportation (Non-Medical): No   Physical Activity: Not on file   Stress: Not on file   Social Connections: Not on file   Intimate Partner Violence: Not on file   Housing Stability: Not on file     Allergies   Allergen Reactions    Penicillin G Rash and Itching     pt reports that she gets a rash all over her body and gets itchy    Amoxicillin Rash and Itching     Tolerates cephalosporins, pt reports that she gets a rash all over her body and gets itchy     Outpatient Encounter Medications as of 2023   Medication Sig Dispense Refill    Lidocaine 4 % Patch Apply 1 Each topically every 12 hours. 1 Patch 0    methocarbamol (ROBAXIN) 500 MG Tab Take 1 Tablet by mouth 3 times a day. 21 Tablet 0    ibuprofen (MOTRIN) 600 MG Tab Take 1 Tablet by mouth every 6 hours as  "needed for Mild Pain or Moderate Pain. 30 Tablet 0    acetaminophen (TYLENOL) 500 MG Tab Take 1-2 Tablets by mouth every 6 hours as needed for Moderate Pain. 30 Tablet 0    gabapentin (NEURONTIN) 100 MG Cap Take 1 Capsule by mouth in the morning, at noon, and at bedtime for 30 days. 90 Capsule 0    albuterol 108 (90 Base) MCG/ACT Aero Soln inhalation aerosol Inhale 2 Puffs every 6 hours as needed for Shortness of Breath. 8.5 g 0    fluticasone (FLONASE) 50 MCG/ACT nasal spray Administer 2 Sprays into each nostril every morning.      ADVAIR DISKUS 100-50 MCG/ACT AEROSOL POWDER, BREATH ACTIVATED Inhale 1 Puff 2 times a day.      aspirin 81 MG EC tablet Take 81 mg by mouth every day.      furosemide (LASIX) 20 MG Tab Take 1 Tablet by mouth every day. 90 Tablet 0    aripiprazole (ABILIFY) 30 MG tablet Take 1 Tablet by mouth every morning. Indications: Major Depressive Disorder 30 Tablet 0    atorvastatin (LIPITOR) 20 MG Tab Take 1 Tablet by mouth at bedtime. 30 Tablet 0    montelukast (SINGULAIR) 10 MG Tab Take 1 Tablet by mouth every evening. 30 Tablet 0    [DISCONTINUED] methylPREDNISolone (MEDROL DOSEPAK) 4 MG Tablet Therapy Pack Follow schedule on package instructions. (Patient not taking: Reported on 11/29/2023) 21 Tablet 0     No facility-administered encounter medications on file as of 11/29/2023.     Review of Systems   HENT:          Tooth pain   Respiratory:  Positive for cough and sputum production. Negative for shortness of breath.    Cardiovascular:  Positive for leg swelling. Negative for chest pain, palpitations and orthopnea.   Neurological:  Negative for dizziness and loss of consciousness.   All other systems reviewed and are negative.             Objective     /56 (BP Location: Left arm, Patient Position: Sitting)   Pulse 62   Resp 16   Ht 1.549 m (5' 1\")   Wt (!) 125 kg (276 lb)   LMP  (LMP Unknown)   SpO2 99%   BMI 52.15 kg/m²     Physical Exam  Vitals reviewed.   Constitutional:       " General: She is not in acute distress.     Appearance: She is overweight.      Comments: Ambulating with a walker   Cardiovascular:      Rate and Rhythm: Normal rate and regular rhythm.      Heart sounds: No murmur heard.  Pulmonary:      Effort: Pulmonary effort is normal. No respiratory distress.      Breath sounds: Normal breath sounds. No rhonchi.   Abdominal:      General: There is no distension.      Tenderness: There is no abdominal tenderness.   Musculoskeletal:      Cervical back: Normal range of motion.      Right lower leg: 3+ Edema present.      Left lower leg: 3+ Edema present.   Neurological:      General: No focal deficit present.      Mental Status: She is alert.            Lab Results   Component Value Date/Time    CHOLSTRLTOT 130 11/01/2023 01:26 PM    LDL 57 11/01/2023 01:26 PM    HDL 44 11/01/2023 01:26 PM    TRIGLYCERIDE 144 11/01/2023 01:26 PM       Lab Results   Component Value Date/Time    SODIUM 138 11/24/2023 06:37 PM    POTASSIUM 3.9 11/24/2023 06:37 PM    CHLORIDE 103 11/24/2023 06:37 PM    CO2 26 11/24/2023 06:37 PM    GLUCOSE 167 (H) 11/24/2023 06:37 PM    BUN 15 11/24/2023 06:37 PM    CREATININE 1.24 11/24/2023 06:37 PM    CREATININE 0.9 09/11/2008 01:45 PM    BUNCREATRAT 7.5 05/19/2022 02:18 AM     Lab Results   Component Value Date/Time    ALKPHOSPHAT 122 (H) 11/24/2023 06:37 PM    ASTSGOT 10 (L) 11/24/2023 06:37 PM    ALTSGPT 16 11/24/2023 06:37 PM    TBILIRUBIN 0.4 11/24/2023 06:37 PM      Nuclear medicine stress test 8/9/2023  NUCLEAR IMAGING INTERPRETATION   No evidence of significant jeopardized viable myocardium or prior myocardial    infarction.   Normal left ventricular size, ejection fraction, and wall motion.   ECG INTERPRETATION   No ischemic changes with stress compared to baseline ECG.    Transthoracic echocardiogram 10/1/2022  CONCLUSIONS  Normal left ventricular size, wall thickness, and systolic function.  Prior echocardiogram 2/27/2022, now without evidence of  intracavitary   gradient.    Assessment & Plan     1. Chest pain, unspecified type        2. Cardiomegaly        3. KATHIA (obstructive sleep apnea)        4. Morbid obesity with alveolar hypoventilation (HCC)        5. Undifferentiated schizophrenia (HCC)        6. Paroxysmal atrial fibrillation (HCC)        7. Mixed hyperlipidemia        8. Lower extremity edema        9. Class 3 severe obesity due to excess calories with body mass index (BMI) of 50.0 to 59.9 in adult, unspecified whether serious comorbidity present (HCC)            Medical Decision Making: Today's Assessment/Status/Plan:        Arrhythmia.  Patient has history of low burden of A-fib, does have an ILR in place.  Her [id number] is only 1 at this time does not warrant oral anticoagulant elation, however this number does increase would consider starting her on long-term oral anticoagulation.  Also of note she has had asymptomatic pauses during the night, believe this is related to her underlying sleep apnea, recommend that she try to obtain her CPAP machine as soon as possible to help prevent these in the future.    Hypertension.  Well-controlled with current regimen.    Leg swelling.  Chronic has been on Lasix for a few years, most recent labs show that she is currently doing fine without potassium replacement, continue 20 mg Lasix daily.  Recommended patient try to find compression socks in her size as this will likely help with symptoms.    Follow-up with cardiology in 6 months.    Sisi Rosen, MSN, APRN  [facility]  [phone]    Please note this dictation was created using voice recognition software.  I have made every reasonable attempt to correct obvious errors, but there may be errors of grammar and possibly content that I did not discover before finalizing the note.

## 2023-11-29 NOTE — Clinical Note
Hello there, just saw this patient in clinic, recently had a significant pause overnight, believe it is related to her sleep apnea. She still has not been able to obtain her CPAP since you last saw her in May. Hoping someone in your office might be able to help her. Thanks

## 2023-11-30 ENCOUNTER — APPOINTMENT (OUTPATIENT)
Dept: RADIOLOGY | Facility: MEDICAL CENTER | Age: 48
End: 2023-11-30
Attending: EMERGENCY MEDICINE
Payer: MEDICAID

## 2023-11-30 ENCOUNTER — HOSPITAL ENCOUNTER (EMERGENCY)
Facility: MEDICAL CENTER | Age: 48
End: 2023-11-30
Attending: EMERGENCY MEDICINE
Payer: MEDICAID

## 2023-11-30 VITALS
TEMPERATURE: 97.1 F | WEIGHT: 274.69 LBS | HEIGHT: 61 IN | HEART RATE: 96 BPM | RESPIRATION RATE: 20 BRPM | OXYGEN SATURATION: 94 % | BODY MASS INDEX: 51.86 KG/M2 | DIASTOLIC BLOOD PRESSURE: 57 MMHG | SYSTOLIC BLOOD PRESSURE: 113 MMHG

## 2023-11-30 DIAGNOSIS — J06.9 VIRAL UPPER RESPIRATORY TRACT INFECTION: ICD-10-CM

## 2023-11-30 LAB
FLUAV RNA SPEC QL NAA+PROBE: NEGATIVE
FLUBV RNA SPEC QL NAA+PROBE: NEGATIVE
RSV RNA SPEC QL NAA+PROBE: NEGATIVE
SARS-COV-2 RNA RESP QL NAA+PROBE: NOTDETECTED

## 2023-11-30 PROCEDURE — 700102 HCHG RX REV CODE 250 W/ 637 OVERRIDE(OP): Mod: UD | Performed by: EMERGENCY MEDICINE

## 2023-11-30 PROCEDURE — 0241U HCHG SARS-COV-2 COVID-19 NFCT DS RESP RNA 4 TRGT ED POC: CPT

## 2023-11-30 PROCEDURE — 99284 EMERGENCY DEPT VISIT MOD MDM: CPT

## 2023-11-30 PROCEDURE — 71045 X-RAY EXAM CHEST 1 VIEW: CPT

## 2023-11-30 PROCEDURE — A9270 NON-COVERED ITEM OR SERVICE: HCPCS | Mod: UD | Performed by: EMERGENCY MEDICINE

## 2023-11-30 PROCEDURE — C9803 HOPD COVID-19 SPEC COLLECT: HCPCS

## 2023-11-30 RX ORDER — ALBUTEROL SULFATE 90 UG/1
2 AEROSOL, METERED RESPIRATORY (INHALATION) ONCE
Status: COMPLETED | OUTPATIENT
Start: 2023-11-30 | End: 2023-11-30

## 2023-11-30 RX ADMIN — ALBUTEROL SULFATE 2 PUFF: 90 AEROSOL, METERED RESPIRATORY (INHALATION) at 13:12

## 2023-11-30 ASSESSMENT — FIBROSIS 4 INDEX: FIB4 SCORE: 0.54

## 2023-11-30 NOTE — ED TRIAGE NOTES
"Chief Complaint   Patient presents with    Flu Like Symptoms     Cough, body aches, congestion since Monday.     Pt BIBA to triage for above complaint. Covid swab collected and sent to lab. Pt amb with steady gait. Pt educated to inform staff of any changes.    BP (!) 143/52   Pulse 76   Temp 36.9 °C (98.5 °F) (Temporal)   Resp 18   Ht 1.549 m (5' 1\")   Wt 125 kg (274 lb 11.1 oz)   LMP  (LMP Unknown)   SpO2 96% Comment: RA  BMI 51.90 kg/m²     "

## 2023-11-30 NOTE — ED PROVIDER NOTES
ED Provider Note    CHIEF COMPLAINT  Chief Complaint   Patient presents with    Flu Like Symptoms     Cough, body aches, congestion since Monday.       EXTERNAL RECORDS REVIEWED  Inpatient Notes most recent admission for back pain 1 month prior    HPI/ROS  LIMITATION TO HISTORY     OUTSIDE HISTORIAN(S):      Lorene Haro is a 48 y.o. female who presents to the emergency department with chief complaint of flulike symptoms.  Patient reports that over the last several days she has had intermittent fevers chills muscle aches and slight cough.  No shortness of breath no chest pain no abdominal pain no diarrhea no pain or swelling in her lower extremities.  She is vaccinated against COVID and the flu.    PAST MEDICAL HISTORY   has a past medical history of A-fib ([facility]), MONI (acute kidney injury) ([facility]) (2023), Asthma, Bipolar 2 disorder (HCC), Chickenpox, Chronic obstructive pulmonary disease (HCC), Hypertension, On home oxygen therapy, Other psychological stress, Schizophrenic disorder (HCC), and Yeast infection of the skin (2021).    SURGICAL HISTORY   has a past surgical history that includes hysterectomy laparoscopy; colectomy; cholecystectomy; and knee arthroscopy (Left).    FAMILY HISTORY  Family History   Problem Relation Age of Onset    No Known Problems Mother     Cancer Sister        SOCIAL HISTORY  Social History     Tobacco Use    Smoking status: Former     Current packs/day: 0.00     Types: Cigarettes     Quit date: 10/12/2022     Years since quittin.1    Smokeless tobacco: Never   Vaping Use    Vaping Use: Never used   Substance and Sexual Activity    Alcohol use: Not Currently    Drug use: Never    Sexual activity: Not Currently       CURRENT MEDICATIONS  Home Medications       Reviewed by Sara Talbot R.N. (Registered Nurse) on 23 at 1128  Med List Status: Not Addressed     Medication Last Dose Status   acetaminophen (TYLENOL) 500 MG Tab  Active   ADVAIR DISKUS 100-50 MCG/ACT  "AEROSOL POWDER, BREATH ACTIVATED  Active   albuterol 108 (90 Base) MCG/ACT Aero Soln inhalation aerosol  Active   aripiprazole (ABILIFY) 30 MG tablet  Active   aspirin 81 MG EC tablet  Active   atorvastatin (LIPITOR) 20 MG Tab  Active   fluticasone (FLONASE) 50 MCG/ACT nasal spray  Active   furosemide (LASIX) 20 MG Tab  Active   gabapentin (NEURONTIN) 100 MG Cap  Active   ibuprofen (MOTRIN) 600 MG Tab  Active   Lidocaine 4 % Patch  Active   methocarbamol (ROBAXIN) 500 MG Tab  Active   montelukast (SINGULAIR) 10 MG Tab  Active                    ALLERGIES  Allergies   Allergen Reactions    Penicillin G Rash and Itching     pt reports that she gets a rash all over her body and gets itchy    Amoxicillin Rash and Itching     Tolerates cephalosporins, pt reports that she gets a rash all over her body and gets itchy       PHYSICAL EXAM  VITAL SIGNS: BP (!) 143/52   Pulse 76   Temp 36.9 °C (98.5 °F) (Temporal)   Resp 18   Ht 1.549 m (5' 1\")   Wt 125 kg (274 lb 11.1 oz)   LMP  (LMP Unknown)   SpO2 96% Comment: RA  BMI 51.90 kg/m²      Pulse ox interpretation: I interpret this pulse ox as normal.  Constitutional: Alert and oriented x 3, minimal distress  HEENT: Atraumatic normocephalic, pupils are equal round reactive to light extraocular movements are intact. The nares is clear, external ears are normal, mouth shows moist mucous membranes normal dentition for age  Neck: Supple, no JVD no tracheal deviation  Cardiovascular: Regular rate and rhythm no murmur rub or gallop 2+ pulses peripherally x4  Thorax & Lungs: No respiratory distress, no wheezes rales or rhonchi, No chest tenderness.   GI: Morbidly obese soft nontender nondistended positive bowel sounds, no peritoneal signs  Skin: Warm dry no acute rash or lesion  Musculoskeletal: Moving all extremities with full range and 5 of 5 strength no acute  deformity  Neurologic: Cranial nerves III through XII are grossly intact no sensory deficit no cerebellar dysfunction "   Psychiatric: Appropriate affect for situation at this time      DIAGNOSTIC STUDIES / PROCEDURES  Results for orders placed or performed during the hospital encounter of 11/30/23   POC CoV-2, FLU A/B, RSV by PCR   Result Value Ref Range    POC Influenza A RNA, PCR Negative Negative    POC Influenza B RNA, PCR Negative Negative    POC RSV, by PCR Negative Negative    POC SARS-CoV-2, PCR NotDetected         RADIOLOGY  I have independently interpreted the diagnostic imaging associated with this visit and am waiting the final reading from the radiologist.   My preliminary interpretation is as follows:   No acute cardiopulmonary process, chronic cardiomegaly.      Radiologist interpretation:   DX-CHEST-PORTABLE (1 VIEW)   Final Result      Unchanged left basilar atelectasis and/or consolidation. Underlying infection is possible.            COURSE & MEDICAL DECISION MAKING    ED Observation Status? No; Patient does not meet criteria for ED Observation.     ASSESSMENT, COURSE AND PLAN  Care Narrative: Patient well-known to this emergency department her vital signs are unremarkable today she is in no respiratory distress her chest x-ray is negative her viral swab is negative as above.Given albuterol here with improvement of symptoms.  Instructions use albuterol every 6 hours as necessary.  Return for worsening cough shortness of breath fevers not responsive to antipyretic any other acute symptom change or concern otherwise discharged in stable and improved condition.          ADDITIONAL PROBLEM LIST    DISPOSITION AND DISCUSSIONS  I have discussed management of the patient with the following physicians and JAY's:      Discussion of management with other QHP or appropriate source(s): None     Escalation of care considered, and ultimately not performed:blood analysis    Barriers to care at this time, including but not limited to: Patient does not have established PCP.     Decision tools and prescription drugs considered  "including, but not limited to:  Albuterol .  /57   Pulse 96   Temp 36.2 °C (97.1 °F) (Temporal)   Resp 20   Ht 1.549 m (5' 1\")   Wt 125 kg (274 lb 11.1 oz)   LMP  (LMP Unknown)   SpO2 94%   BMI 51.90 kg/m²       [facility]  [address] [facility] [address] [phone]    for establishment of primary care, for blood pressure management    [facility], Emergency Dept  [address] [facility] [address] [phone]    in 12-24 hours if symptoms persist, immediately If symptoms worsen, or if you develop any other symptoms or concerns      FINAL DIAGNOSIS  1. Viral upper respiratory tract infection           Electronically signed by: Martinez Hines M.D.      "

## 2023-12-02 ENCOUNTER — APPOINTMENT (OUTPATIENT)
Dept: RADIOLOGY | Facility: MEDICAL CENTER | Age: 48
End: 2023-12-02
Attending: EMERGENCY MEDICINE
Payer: MEDICAID

## 2023-12-02 ENCOUNTER — HOSPITAL ENCOUNTER (EMERGENCY)
Facility: MEDICAL CENTER | Age: 48
End: 2023-12-02
Attending: EMERGENCY MEDICINE
Payer: MEDICAID

## 2023-12-02 VITALS
SYSTOLIC BLOOD PRESSURE: 105 MMHG | RESPIRATION RATE: 15 BRPM | TEMPERATURE: 98 F | OXYGEN SATURATION: 93 % | WEIGHT: 276 LBS | BODY MASS INDEX: 52.15 KG/M2 | DIASTOLIC BLOOD PRESSURE: 50 MMHG | HEART RATE: 67 BPM

## 2023-12-02 DIAGNOSIS — R07.9 CHEST PAIN, UNSPECIFIED TYPE: ICD-10-CM

## 2023-12-02 DIAGNOSIS — R06.00 DYSPNEA, UNSPECIFIED TYPE: ICD-10-CM

## 2023-12-02 LAB
ALBUMIN SERPL BCP-MCNC: 3.8 G/DL (ref 3.2–4.9)
ALBUMIN/GLOB SERPL: 1.1 G/DL
ALP SERPL-CCNC: 143 U/L (ref 30–99)
ALT SERPL-CCNC: 16 U/L (ref 2–50)
ANION GAP SERPL CALC-SCNC: 9 MMOL/L (ref 7–16)
AST SERPL-CCNC: 12 U/L (ref 12–45)
BASOPHILS # BLD AUTO: 0.4 % (ref 0–1.8)
BASOPHILS # BLD: 0.03 K/UL (ref 0–0.12)
BILIRUB SERPL-MCNC: 0.7 MG/DL (ref 0.1–1.5)
BUN SERPL-MCNC: 14 MG/DL (ref 8–22)
CALCIUM ALBUM COR SERPL-MCNC: 9.4 MG/DL (ref 8.5–10.5)
CALCIUM SERPL-MCNC: 9.2 MG/DL (ref 8.5–10.5)
CHLORIDE SERPL-SCNC: 108 MMOL/L (ref 96–112)
CO2 SERPL-SCNC: 27 MMOL/L (ref 20–33)
CREAT SERPL-MCNC: 0.9 MG/DL (ref 0.5–1.4)
D DIMER PPP IA.FEU-MCNC: <0.27 UG/ML (FEU) (ref 0–0.5)
EKG IMPRESSION: NORMAL
EOSINOPHIL # BLD AUTO: 0.12 K/UL (ref 0–0.51)
EOSINOPHIL NFR BLD: 1.4 % (ref 0–6.9)
ERYTHROCYTE [DISTWIDTH] IN BLOOD BY AUTOMATED COUNT: 54.6 FL (ref 35.9–50)
GFR SERPLBLD CREATININE-BSD FMLA CKD-EPI: 78 ML/MIN/1.73 M 2
GLOBULIN SER CALC-MCNC: 3.4 G/DL (ref 1.9–3.5)
GLUCOSE SERPL-MCNC: 129 MG/DL (ref 65–99)
HCT VFR BLD AUTO: 41.8 % (ref 37–47)
HGB BLD-MCNC: 12.7 G/DL (ref 12–16)
IMM GRANULOCYTES # BLD AUTO: 0.03 K/UL (ref 0–0.11)
IMM GRANULOCYTES NFR BLD AUTO: 0.4 % (ref 0–0.9)
LYMPHOCYTES # BLD AUTO: 1.5 K/UL (ref 1–4.8)
LYMPHOCYTES NFR BLD: 18 % (ref 22–41)
MCH RBC QN AUTO: 26.7 PG (ref 27–33)
MCHC RBC AUTO-ENTMCNC: 30.4 G/DL (ref 32.2–35.5)
MCV RBC AUTO: 87.8 FL (ref 81.4–97.8)
MONOCYTES # BLD AUTO: 0.37 K/UL (ref 0–0.85)
MONOCYTES NFR BLD AUTO: 4.4 % (ref 0–13.4)
NEUTROPHILS # BLD AUTO: 6.29 K/UL (ref 1.82–7.42)
NEUTROPHILS NFR BLD: 75.4 % (ref 44–72)
NRBC # BLD AUTO: 0 K/UL
NRBC BLD-RTO: 0 /100 WBC (ref 0–0.2)
NT-PROBNP SERPL IA-MCNC: 235 PG/ML (ref 0–125)
PLATELET # BLD AUTO: 221 K/UL (ref 164–446)
PMV BLD AUTO: 11.5 FL (ref 9–12.9)
POTASSIUM SERPL-SCNC: 4.4 MMOL/L (ref 3.6–5.5)
PROT SERPL-MCNC: 7.2 G/DL (ref 6–8.2)
RBC # BLD AUTO: 4.76 M/UL (ref 4.2–5.4)
SODIUM SERPL-SCNC: 144 MMOL/L (ref 135–145)
TROPONIN T SERPL-MCNC: 36 NG/L (ref 6–19)
WBC # BLD AUTO: 8.3 K/UL (ref 4.8–10.8)

## 2023-12-02 PROCEDURE — 700111 HCHG RX REV CODE 636 W/ 250 OVERRIDE (IP): Mod: JZ,UD | Performed by: EMERGENCY MEDICINE

## 2023-12-02 PROCEDURE — 84484 ASSAY OF TROPONIN QUANT: CPT

## 2023-12-02 PROCEDURE — 99285 EMERGENCY DEPT VISIT HI MDM: CPT

## 2023-12-02 PROCEDURE — 93005 ELECTROCARDIOGRAM TRACING: CPT | Performed by: EMERGENCY MEDICINE

## 2023-12-02 PROCEDURE — 96374 THER/PROPH/DIAG INJ IV PUSH: CPT

## 2023-12-02 PROCEDURE — 80053 COMPREHEN METABOLIC PANEL: CPT

## 2023-12-02 PROCEDURE — 83880 ASSAY OF NATRIURETIC PEPTIDE: CPT

## 2023-12-02 PROCEDURE — 85025 COMPLETE CBC W/AUTO DIFF WBC: CPT

## 2023-12-02 PROCEDURE — 71045 X-RAY EXAM CHEST 1 VIEW: CPT

## 2023-12-02 PROCEDURE — 36415 COLL VENOUS BLD VENIPUNCTURE: CPT

## 2023-12-02 PROCEDURE — 93005 ELECTROCARDIOGRAM TRACING: CPT

## 2023-12-02 PROCEDURE — 85379 FIBRIN DEGRADATION QUANT: CPT

## 2023-12-02 RX ORDER — FUROSEMIDE 10 MG/ML
40 INJECTION INTRAMUSCULAR; INTRAVENOUS ONCE
Status: COMPLETED | OUTPATIENT
Start: 2023-12-02 | End: 2023-12-02

## 2023-12-02 RX ADMIN — FUROSEMIDE 40 MG: 10 INJECTION, SOLUTION INTRAVENOUS at 10:45

## 2023-12-02 ASSESSMENT — FIBROSIS 4 INDEX: FIB4 SCORE: 0.54

## 2023-12-02 NOTE — ED TRIAGE NOTES
Pt bib ems from home c/o sob and c/p since yesterday. Pt states she was seen here for same Thursday and dc'd home told to come back if gets worse. Pt states having 9/10 c/p EKG at bedside. Pt nad. Denies fevers. EMS reported pt RA in 80's on arrival so placed on 2L. RA trial now pt 89-92%

## 2023-12-02 NOTE — ED NOTES
..Pt verbalizes understanding of dc instructions provided. Pt states that all questions have been answered and they feel comfortable with discharge instructions provided. Pt has dc paperwork in hand. Pt has all belongings in possession. I called [facility] for pt who is coming to get her. Pt to lobby w/o incident.

## 2023-12-02 NOTE — ED PROVIDER NOTES
ED Provider Note    CHIEF COMPLAINT  Chief Complaint   Patient presents with    Shortness of Breath    Chest Pain       EXTERNAL RECORDS REVIEWED  Inpatient Notes ER visit 2023 diagnosed with viral URI.  and Outpatient Notes outpatient cardiology office visit 2023 for A-fib and chronic chest pain.    HPI/ROS  LIMITATION TO HISTORY   Select: : None  OUTSIDE HISTORIAN(S):  None    Lorene Haro is a 48 y.o. female who presents to the ER with recurrent chest pain or shortness of breath.  Past medical history as document below.  Patient was recently seen in the emergency department with similar presentation.  Diagnosed with viral URI.  Viral panel at that time was negative.  Patient states that she is following return precautions that she did have persistent symptoms and therefore came back today.  Has not followed up with outpatient PCP in the interim.  No significant change otherwise.    PAST MEDICAL HISTORY   has a past medical history of A-fib (HCC), MONI (acute kidney injury) ([facility]) (2023), Asthma, Bipolar 2 disorder (HCC), Chickenpox, Chronic obstructive pulmonary disease (HCC), Hypertension, On home oxygen therapy, Other psychological stress, Schizophrenic disorder (HCC), and Yeast infection of the skin (2021).    SURGICAL HISTORY   has a past surgical history that includes hysterectomy laparoscopy; colectomy; cholecystectomy; and knee arthroscopy (Left).    FAMILY HISTORY  Family History   Problem Relation Age of Onset    No Known Problems Mother     Cancer Sister        SOCIAL HISTORY  Social History     Tobacco Use    Smoking status: Former     Current packs/day: 0.00     Types: Cigarettes     Quit date: 10/12/2022     Years since quittin.1    Smokeless tobacco: Never   Vaping Use    Vaping Use: Never used   Substance and Sexual Activity    Alcohol use: Not Currently    Drug use: Never    Sexual activity: Not Currently       CURRENT MEDICATIONS  Home Medications       Reviewed by  Flower Douglas R.N. (Registered Nurse) on 12/02/23 at 0931  Med List Status: Partial     Medication Last Dose Status   acetaminophen (TYLENOL) 500 MG Tab  Active   ADVAIR DISKUS 100-50 MCG/ACT AEROSOL POWDER, BREATH ACTIVATED  Active   albuterol 108 (90 Base) MCG/ACT Aero Soln inhalation aerosol  Active   aripiprazole (ABILIFY) 30 MG tablet  Active   aspirin 81 MG EC tablet  Active   atorvastatin (LIPITOR) 20 MG Tab  Active   fluticasone (FLONASE) 50 MCG/ACT nasal spray  Active   furosemide (LASIX) 20 MG Tab  Active   gabapentin (NEURONTIN) 100 MG Cap  Active   ibuprofen (MOTRIN) 600 MG Tab  Active   Lidocaine 4 % Patch  Active   methocarbamol (ROBAXIN) 500 MG Tab  Active   montelukast (SINGULAIR) 10 MG Tab  Active                    ALLERGIES  Allergies   Allergen Reactions    Penicillin G Rash and Itching     pt reports that she gets a rash all over her body and gets itchy    Amoxicillin Rash and Itching     Tolerates cephalosporins, pt reports that she gets a rash all over her body and gets itchy       PHYSICAL EXAM  VITAL SIGNS: /50   Pulse 67   Temp 36.7 °C (98 °F) (Temporal)   Resp 15   Wt (!) 125 kg (276 lb)   LMP  (LMP Unknown)   SpO2 93%   BMI 52.15 kg/m²      Pulse ox interpretation: I interpret this pulse ox as normal.  Constitutional: Alert in no apparent distress.  HENT: No signs of trauma, Bilateral external ears normal, Nose normal.   Eyes: Pupils are equal and reactive  Neck: Normal range of motion, No tenderness, Supple  Cardiovascular: Regular rate and rhythm, no murmurs.   Thorax & Lungs: Normal breath sounds, No respiratory distress, No wheezing, No chest tenderness.   Abdomen: Bowel sounds normal, Soft, No tenderness  Skin: Warm, Dry, No erythema, No rash.   Back: No bony tenderness, No CVA tenderness.   Extremities: Intact distal pulses, 1+ edema bilateral  Musculoskeletal: Good range of motion in all major joints. No tenderness to palpation or major deformities noted.    Neurologic: Alert , Normal motor function, Normal sensory function, No focal deficits noted.   Psychiatric: Affect normal, Judgment normal, Mood normal.         DIAGNOSTIC STUDIES / PROCEDURES      LABS  Results for orders placed or performed during the hospital encounter of 12/02/23   Troponin - STAT Once   Result Value Ref Range    Troponin T 36 (H) 6 - 19 ng/L   proBrain Natriuretic Peptide, NT   Result Value Ref Range    NT-proBNP 235 (H) 0 - 125 pg/mL   D-Dimer (only helpful in low pre-test probability wells critieria. Do not order if patient ruled out by PERC criteria. See Weblinks at top of Labs section)   Result Value Ref Range    D-Dimer <0.27 0.00 - 0.50 ug/mL (FEU)   CBC WITH DIFFERENTIAL   Result Value Ref Range    WBC 8.3 4.8 - 10.8 K/uL    RBC 4.76 4.20 - 5.40 M/uL    Hemoglobin 12.7 12.0 - 16.0 g/dL    Hematocrit 41.8 37.0 - 47.0 %    MCV 87.8 81.4 - 97.8 fL    MCH 26.7 (L) 27.0 - 33.0 pg    MCHC 30.4 (L) 32.2 - 35.5 g/dL    RDW 54.6 (H) 35.9 - 50.0 fL    Platelet Count 221 164 - 446 K/uL    MPV 11.5 9.0 - 12.9 fL    Neutrophils-Polys 75.40 (H) 44.00 - 72.00 %    Lymphocytes 18.00 (L) 22.00 - 41.00 %    Monocytes 4.40 0.00 - 13.40 %    Eosinophils 1.40 0.00 - 6.90 %    Basophils 0.40 0.00 - 1.80 %    Immature Granulocytes 0.40 0.00 - 0.90 %    Nucleated RBC 0.00 0.00 - 0.20 /100 WBC    Neutrophils (Absolute) 6.29 1.82 - 7.42 K/uL    Lymphs (Absolute) 1.50 1.00 - 4.80 K/uL    Monos (Absolute) 0.37 0.00 - 0.85 K/uL    Eos (Absolute) 0.12 0.00 - 0.51 K/uL    Baso (Absolute) 0.03 0.00 - 0.12 K/uL    Immature Granulocytes (abs) 0.03 0.00 - 0.11 K/uL    NRBC (Absolute) 0.00 K/uL   Comp Metabolic Panel   Result Value Ref Range    Sodium 144 135 - 145 mmol/L    Potassium 4.4 3.6 - 5.5 mmol/L    Chloride 108 96 - 112 mmol/L    Co2 27 20 - 33 mmol/L    Anion Gap 9.0 7.0 - 16.0    Glucose 129 (H) 65 - 99 mg/dL    Bun 14 8 - 22 mg/dL    Creatinine 0.90 0.50 - 1.40 mg/dL    Calcium 9.2 8.5 - 10.5 mg/dL     Correct Calcium 9.4 8.5 - 10.5 mg/dL    AST(SGOT) 12 12 - 45 U/L    ALT(SGPT) 16 2 - 50 U/L    Alkaline Phosphatase 143 (H) 30 - 99 U/L    Total Bilirubin 0.7 0.1 - 1.5 mg/dL    Albumin 3.8 3.2 - 4.9 g/dL    Total Protein 7.2 6.0 - 8.2 g/dL    Globulin 3.4 1.9 - 3.5 g/dL    A-G Ratio 1.1 g/dL   ESTIMATED GFR   Result Value Ref Range    GFR (CKD-EPI) 78 >60 mL/min/1.73 m 2   EKG   Result Value Ref Range    Report       [facility] Emergency Dept.    Test Date:  2023  Pt Name:    ADELINA MILLAN                Department: ER  MRN:        [id number]                      Room:        03  Gender:     Female                       Technician:  :        [id number]                   Requested By:ER TRIAGE PROTOCOL  Order #:    [id number]                    Reading MD:    Measurements  Intervals                                Axis  Rate:       67                           P:          63  CO:         135                          QRS:        76  QRSD:       103                          T:          33  QT:         390  QTc:        412    Interpretive Statements  Sinus rhythm  Atrial premature complex  Low voltage, precordial leads  RSR' in V1 or V2, probably normal variant  Compared to ECG 2023 18:45:09  Atrial premature complex(es) now present  Right ventricular hypertrophy no longer present     EKG interpreted by myself as above      RADIOLOGY  I have independently interpreted the diagnostic imaging associated with this visit and am waiting the final reading from the radiologist.     My preliminary interpretation is as follows: No large consolidation suggestive of pneumonia nor large effusion    Radiologist interpretation:   DX-CHEST-PORTABLE (1 VIEW)   Final Result      Cardiomegaly with mild interstitial prominence.            COURSE & MEDICAL DECISION MAKING    ED Observation Status? No; Patient does not meet criteria for ED Observation.     INITIAL ASSESSMENT, COURSE AND PLAN  Care Narrative:  Patient presenting back to the emergency department for recurrent chest pain and dyspnea.    DISPOSITION AND DISCUSSIONS  I have discussed management of the patient with the following physicians and JAY's: None    Discussion of management with other QHP or appropriate source(s): None     Escalation of care considered, and ultimately not performed:acute inpatient care management, however at this time, the patient is most appropriate for outpatient management    Barriers to care at this time, including but not limited to: Patient does not have established PCP.     Decision tools and prescription drugs considered including, but not limited to: HEART Score low .    48-year-old presented to the emerged part with above presentation.  Workup is largely reassuring.  Chest x-ray showing mild interstitial prominence.  I do believe that the patient will benefit from additional diuretics here in the emergency department.  She is on Lasix at home and will be provided with the same here.  Hematologic evaluation largely unremarkable and unchanged from baseline.  D-dimer is negative.  Troponin is at its baseline.  Again at this point ACS concern is low.    Patient has documented chronic chest pain and has been worked up with outpatient cardiology.  I have recommended recurrent outpatient appointments at this time.  She is understanding to continue to comply with her outpatient medications and return to ER with any change or worsening.    FINAL DIAGNOSIS  1. Chest pain, unspecified type    2. Dyspnea, unspecified type           Electronically signed by: Juan Ann M.D., 12/2/2023 9:45 AM

## 2023-12-19 ENCOUNTER — HOSPITAL ENCOUNTER (EMERGENCY)
Facility: MEDICAL CENTER | Age: 48
End: 2023-12-20
Attending: EMERGENCY MEDICINE
Payer: MEDICAID

## 2023-12-19 ENCOUNTER — APPOINTMENT (OUTPATIENT)
Dept: RADIOLOGY | Facility: MEDICAL CENTER | Age: 48
End: 2023-12-19
Attending: EMERGENCY MEDICINE
Payer: MEDICAID

## 2023-12-19 DIAGNOSIS — J18.9 PNEUMONIA OF LEFT LOWER LOBE DUE TO INFECTIOUS ORGANISM: ICD-10-CM

## 2023-12-19 DIAGNOSIS — J44.1 ACUTE EXACERBATION OF CHRONIC OBSTRUCTIVE PULMONARY DISEASE (COPD) (HCC): ICD-10-CM

## 2023-12-19 DIAGNOSIS — J06.9 UPPER RESPIRATORY TRACT INFECTION, UNSPECIFIED TYPE: ICD-10-CM

## 2023-12-19 LAB
ALBUMIN SERPL BCP-MCNC: 4.3 G/DL (ref 3.2–4.9)
ALBUMIN/GLOB SERPL: 1.4 G/DL
ALP SERPL-CCNC: 132 U/L (ref 30–99)
ALT SERPL-CCNC: 12 U/L (ref 2–50)
ANION GAP SERPL CALC-SCNC: 11 MMOL/L (ref 7–16)
APPEARANCE UR: CLEAR
AST SERPL-CCNC: 12 U/L (ref 12–45)
BASOPHILS # BLD AUTO: 0.2 % (ref 0–1.8)
BASOPHILS # BLD: 0.03 K/UL (ref 0–0.12)
BILIRUB SERPL-MCNC: 0.7 MG/DL (ref 0.1–1.5)
BILIRUB UR QL STRIP.AUTO: NEGATIVE
BUN SERPL-MCNC: 15 MG/DL (ref 8–22)
CALCIUM ALBUM COR SERPL-MCNC: 9.2 MG/DL (ref 8.5–10.5)
CALCIUM SERPL-MCNC: 9.4 MG/DL (ref 8.5–10.5)
CHLORIDE SERPL-SCNC: 102 MMOL/L (ref 96–112)
CO2 SERPL-SCNC: 25 MMOL/L (ref 20–33)
COLOR UR: YELLOW
CREAT SERPL-MCNC: 0.98 MG/DL (ref 0.5–1.4)
EKG IMPRESSION: NORMAL
EOSINOPHIL # BLD AUTO: 0.1 K/UL (ref 0–0.51)
EOSINOPHIL NFR BLD: 0.7 % (ref 0–6.9)
ERYTHROCYTE [DISTWIDTH] IN BLOOD BY AUTOMATED COUNT: 49.9 FL (ref 35.9–50)
FLUAV RNA SPEC QL NAA+PROBE: NEGATIVE
FLUBV RNA SPEC QL NAA+PROBE: NEGATIVE
GFR SERPLBLD CREATININE-BSD FMLA CKD-EPI: 70 ML/MIN/1.73 M 2
GLOBULIN SER CALC-MCNC: 3 G/DL (ref 1.9–3.5)
GLUCOSE SERPL-MCNC: 93 MG/DL (ref 65–99)
GLUCOSE UR STRIP.AUTO-MCNC: NEGATIVE MG/DL
HCT VFR BLD AUTO: 40.5 % (ref 37–47)
HGB BLD-MCNC: 13 G/DL (ref 12–16)
IMM GRANULOCYTES # BLD AUTO: 0.05 K/UL (ref 0–0.11)
IMM GRANULOCYTES NFR BLD AUTO: 0.4 % (ref 0–0.9)
KETONES UR STRIP.AUTO-MCNC: NEGATIVE MG/DL
LEUKOCYTE ESTERASE UR QL STRIP.AUTO: NEGATIVE
LYMPHOCYTES # BLD AUTO: 2.45 K/UL (ref 1–4.8)
LYMPHOCYTES NFR BLD: 17.5 % (ref 22–41)
MCH RBC QN AUTO: 27 PG (ref 27–33)
MCHC RBC AUTO-ENTMCNC: 32.1 G/DL (ref 32.2–35.5)
MCV RBC AUTO: 84 FL (ref 81.4–97.8)
MICRO URNS: NORMAL
MONOCYTES # BLD AUTO: 0.81 K/UL (ref 0–0.85)
MONOCYTES NFR BLD AUTO: 5.8 % (ref 0–13.4)
NEUTROPHILS # BLD AUTO: 10.58 K/UL (ref 1.82–7.42)
NEUTROPHILS NFR BLD: 75.4 % (ref 44–72)
NITRITE UR QL STRIP.AUTO: NEGATIVE
NRBC # BLD AUTO: 0 K/UL
NRBC BLD-RTO: 0 /100 WBC (ref 0–0.2)
PH UR STRIP.AUTO: 6 [PH] (ref 5–8)
PLATELET # BLD AUTO: 283 K/UL (ref 164–446)
PMV BLD AUTO: 11.3 FL (ref 9–12.9)
POTASSIUM SERPL-SCNC: 4.1 MMOL/L (ref 3.6–5.5)
PROCALCITONIN SERPL-MCNC: 0.08 NG/ML
PROT SERPL-MCNC: 7.3 G/DL (ref 6–8.2)
PROT UR QL STRIP: NEGATIVE MG/DL
RBC # BLD AUTO: 4.82 M/UL (ref 4.2–5.4)
RBC UR QL AUTO: NEGATIVE
RSV RNA SPEC QL NAA+PROBE: NEGATIVE
SARS-COV-2 RNA RESP QL NAA+PROBE: NOTDETECTED
SODIUM SERPL-SCNC: 138 MMOL/L (ref 135–145)
SP GR UR STRIP.AUTO: 1.01
TROPONIN T SERPL-MCNC: 28 NG/L (ref 6–19)
UROBILINOGEN UR STRIP.AUTO-MCNC: 0.2 MG/DL
WBC # BLD AUTO: 14 K/UL (ref 4.8–10.8)

## 2023-12-19 PROCEDURE — 85025 COMPLETE CBC W/AUTO DIFF WBC: CPT

## 2023-12-19 PROCEDURE — 71045 X-RAY EXAM CHEST 1 VIEW: CPT

## 2023-12-19 PROCEDURE — 700111 HCHG RX REV CODE 636 W/ 250 OVERRIDE (IP): Mod: JZ,UD | Performed by: EMERGENCY MEDICINE

## 2023-12-19 PROCEDURE — 36415 COLL VENOUS BLD VENIPUNCTURE: CPT

## 2023-12-19 PROCEDURE — 80053 COMPREHEN METABOLIC PANEL: CPT

## 2023-12-19 PROCEDURE — 81003 URINALYSIS AUTO W/O SCOPE: CPT

## 2023-12-19 PROCEDURE — C9803 HOPD COVID-19 SPEC COLLECT: HCPCS

## 2023-12-19 PROCEDURE — 84484 ASSAY OF TROPONIN QUANT: CPT

## 2023-12-19 PROCEDURE — 84145 PROCALCITONIN (PCT): CPT

## 2023-12-19 PROCEDURE — 0241U HCHG SARS-COV-2 COVID-19 NFCT DS RESP RNA 4 TRGT ED POC: CPT

## 2023-12-19 PROCEDURE — 94640 AIRWAY INHALATION TREATMENT: CPT

## 2023-12-19 PROCEDURE — 93005 ELECTROCARDIOGRAM TRACING: CPT | Performed by: EMERGENCY MEDICINE

## 2023-12-19 PROCEDURE — 96374 THER/PROPH/DIAG INJ IV PUSH: CPT

## 2023-12-19 PROCEDURE — 99285 EMERGENCY DEPT VISIT HI MDM: CPT

## 2023-12-19 PROCEDURE — 700101 HCHG RX REV CODE 250: Mod: UD | Performed by: EMERGENCY MEDICINE

## 2023-12-19 PROCEDURE — 93005 ELECTROCARDIOGRAM TRACING: CPT

## 2023-12-19 PROCEDURE — C9803 HOPD COVID-19 SPEC COLLECT: HCPCS | Performed by: EMERGENCY MEDICINE

## 2023-12-19 RX ORDER — AZITHROMYCIN 250 MG/1
TABLET, FILM COATED ORAL
Qty: 6 TABLET | Refills: 0 | Status: ACTIVE | OUTPATIENT
Start: 2023-12-19 | End: 2023-12-24

## 2023-12-19 RX ORDER — PREDNISONE 20 MG/1
40 TABLET ORAL DAILY
Qty: 10 TABLET | Refills: 0 | Status: SHIPPED | OUTPATIENT
Start: 2023-12-19 | End: 2023-12-24

## 2023-12-19 RX ORDER — METHYLPREDNISOLONE SODIUM SUCCINATE 125 MG/2ML
125 INJECTION, POWDER, LYOPHILIZED, FOR SOLUTION INTRAMUSCULAR; INTRAVENOUS ONCE
Status: COMPLETED | OUTPATIENT
Start: 2023-12-19 | End: 2023-12-19

## 2023-12-19 RX ORDER — IPRATROPIUM BROMIDE AND ALBUTEROL SULFATE 2.5; .5 MG/3ML; MG/3ML
3 SOLUTION RESPIRATORY (INHALATION) ONCE
Status: COMPLETED | OUTPATIENT
Start: 2023-12-19 | End: 2023-12-19

## 2023-12-19 RX ORDER — AZITHROMYCIN 250 MG/1
500 TABLET, FILM COATED ORAL ONCE
Status: COMPLETED | OUTPATIENT
Start: 2023-12-20 | End: 2023-12-20

## 2023-12-19 RX ADMIN — METHYLPREDNISOLONE SODIUM SUCCINATE 125 MG: 125 INJECTION, POWDER, FOR SOLUTION INTRAMUSCULAR; INTRAVENOUS at 23:41

## 2023-12-19 RX ADMIN — IPRATROPIUM BROMIDE AND ALBUTEROL SULFATE 3 ML: 2.5; .5 SOLUTION RESPIRATORY (INHALATION) at 23:03

## 2023-12-19 ASSESSMENT — FIBROSIS 4 INDEX: FIB4 SCORE: 0.68

## 2023-12-20 VITALS
TEMPERATURE: 98.3 F | HEIGHT: 62 IN | WEIGHT: 273.81 LBS | OXYGEN SATURATION: 96 % | RESPIRATION RATE: 18 BRPM | BODY MASS INDEX: 50.39 KG/M2 | DIASTOLIC BLOOD PRESSURE: 62 MMHG | SYSTOLIC BLOOD PRESSURE: 142 MMHG | HEART RATE: 87 BPM

## 2023-12-20 PROCEDURE — 700102 HCHG RX REV CODE 250 W/ 637 OVERRIDE(OP): Mod: UD | Performed by: EMERGENCY MEDICINE

## 2023-12-20 PROCEDURE — A9270 NON-COVERED ITEM OR SERVICE: HCPCS | Mod: UD | Performed by: EMERGENCY MEDICINE

## 2023-12-20 RX ADMIN — AZITHROMYCIN DIHYDRATE 500 MG: 250 TABLET, FILM COATED ORAL at 00:52

## 2023-12-20 NOTE — ED NOTES
Pt able to ambulate around [place] with standby assistance, appropriate to discharge home via MTM

## 2023-12-20 NOTE — ED NOTES
Pt wheeled to grn 23, able to transfer into [facility] independently with steady gait  Pt connected to monitor, call light is within reach    Chart up for ERP

## 2023-12-20 NOTE — DISCHARGE INSTRUCTIONS
Follow-up with primary care 1 to 2 days for reevaluation, medication management.    Continue home medications as previously indicated, including inhalers as needed for COPD exacerbation, wheezing, shortness of breath.  Prednisone daily for 5 days.  Azithromycin daily for 5 days for possible developing pneumonia.    Encourage oral fluid hydration.  Diet and activity as tolerated.    Return to the emergency department for fever, altered mental status, difficulty breathing/wheezing/retractions, chest pain, syncope, vomiting or other new concerns.

## 2023-12-20 NOTE — ED PROVIDER NOTES
ED Provider Note    CHIEF COMPLAINT  Chief Complaint   Patient presents with    Flu Like Symptoms     Pt states she has had a sore throat, cough, subjective fever x5 days.     Chest Pain     L sided chest pain x1 hour       EXTERNAL RECORDS REVIEWED  Inpatient Notes discharge from 11/12/2023 after evaluation for flank pain, leukocytosis.  CT abdomen pelvis without acute intra-abdominal pathology.  Urinalysis within normal limits discharged home 11/5/2023 after low back pain for 2 weeks..  History of paroxysmal atrial fibrillation, no longer on AC, dyslipidemia, chronic hypoxemic respiratory failure secondary to COPD and restrictive lung disease due to body habitus on 2 L, schizophrenia and morbid obesity with BMI of 50.  Presented to the ED on 11/3 with worsening low back pain x 2 weeks, sustained ground-level fall.  CT noted small central posterior disc protrusion at L4-5 with ligamentum flavum hypertrophy, mild central canal stenosis and neuroforaminal narrowing.  Neurosurgery was consulted, MRI imaging small disc bulge no indication for surgery.  Started on Medrol Dosepak and muscle relaxant.  Discharged to follow-up with neurosurgery.    HPI/ROS  LIMITATION TO HISTORY   Select: : None  OUTSIDE HISTORIAN(S):  None    Lorenesamina Haro is a 48 y.o. female who presents to the emergency department by ambulance for multiple vague complaints.  Patient describes 5 days of cough, congestion, sore throat.  Shortness of breath, history of COPD not using inhaler more than normal.  History of pneumonia.  Nurse report indicates patient had with left-sided chest pain, she had to be reminded of this.  None at this time.  No palpitations or syncope.  History of A-fib as well.  Describes compliance with all home medications.  Also complaining of right flank pain, worse with urination.  Denies hematuria, frequency or urgency.  No additional abdominal pain.  No vomiting.  Few episodes of diarrhea.  Tactile fever.  Fatigue.   Tolerating food and fluids.    PAST MEDICAL HISTORY   has a past medical history of A-fib ([facility]), MONI (acute kidney injury) ([facility]) (2023), Asthma, Bipolar 2 disorder (HCC), Chickenpox, Chronic obstructive pulmonary disease (HCC), Hypertension, On home oxygen therapy, Other psychological stress, Schizophrenic disorder ([facility]), and Yeast infection of the skin (2021).    SURGICAL HISTORY   has a past surgical history that includes hysterectomy laparoscopy; colectomy; cholecystectomy; and knee arthroscopy (Left).    FAMILY HISTORY  Family History   Problem Relation Age of Onset    No Known Problems Mother     Cancer Sister        SOCIAL HISTORY  Social History     Tobacco Use    Smoking status: Former     Current packs/day: 0.00     Types: Cigarettes     Quit date: 10/12/2022     Years since quittin.1    Smokeless tobacco: Never   Vaping Use    Vaping Use: Never used   Substance and Sexual Activity    Alcohol use: Not Currently    Drug use: Never    Sexual activity: Not Currently       CURRENT MEDICATIONS  Home Medications       Reviewed by Kranthi Cortes R.N. (Registered Nurse) on 23 at 7  Med List Status: Partial     Medication Last Dose Status   acetaminophen (TYLENOL) 500 MG Tab  Active   ADVAIR DISKUS 100-50 MCG/ACT AEROSOL POWDER, BREATH ACTIVATED  Active   albuterol 108 (90 Base) MCG/ACT Aero Soln inhalation aerosol  Active   aripiprazole (ABILIFY) 30 MG tablet  Active   aspirin 81 MG EC tablet  Active   atorvastatin (LIPITOR) 20 MG Tab  Active   fluticasone (FLONASE) 50 MCG/ACT nasal spray  Active   furosemide (LASIX) 20 MG Tab  Active   ibuprofen (MOTRIN) 600 MG Tab  Active   Lidocaine 4 % Patch  Active   methocarbamol (ROBAXIN) 500 MG Tab  Active   montelukast (SINGULAIR) 10 MG Tab  Active                    ALLERGIES  Allergies   Allergen Reactions    Penicillin G Rash and Itching     pt reports that she gets a rash all over her body and gets itchy    Amoxicillin Rash and Itching      "Tolerates cephalosporins, pt reports that she gets a rash all over her body and gets itchy       PHYSICAL EXAM  VITAL SIGNS: /67   Pulse 75   Temp 36.4 °C (97.6 °F) (Temporal)   Resp 20   Ht 1.575 m (5' 2\")   Wt 124 kg (273 lb 13 oz)   LMP  (LMP Unknown)   SpO2 90%   BMI 50.08 kg/m²    Pulse ox interpretation: I interpret this pulse ox as normal.  Constitutional: Alert in no apparent distress.  Morbidly obese.  Disheveled.  HENT: Normocephalic, atraumatic. Bilateral external ears normal, Nose normal.  Dry mucous membranes.    Eyes: Pupils are equal and reactive, Conjunctiva normal.   Neck: Normal range of motion, Supple.  No stridor or dysphonia.  No meningeal irritation.  Lymphatic: No lymphadenopathy noted.   Cardiovascular: Regular rate and rhythm, no murmurs. Distal pulses intact.  No peripheral edema.  Thorax & Lungs: Slightly diminished bilaterally.  Faint end expiratory wheeze.  No rales/ronchi. No increased work of breathing, clipped speech or retractions.   Abdomen: Obese, soft, non-distended, non-tender to palpation. No palpable or pulsatile masses. No peritoneal signs. No CVA tenderness.  Skin: Warm, Dry, No erythema, No rash.   Musculoskeletal: Good range of motion in all major joints. No major deformities noted.   Neurologic: Alert and orient x 4.  Speech clear and cohesive flat affect.  Cooperative.  Psychiatric: Affect normal, Judgment normal, Mood normal.       DIAGNOSTIC STUDIES / PROCEDURES    LABS  Results for orders placed or performed during the hospital encounter of 12/19/23   CBC with Differential   Result Value Ref Range    WBC 14.0 (H) 4.8 - 10.8 K/uL    RBC 4.82 4.20 - 5.40 M/uL    Hemoglobin 13.0 12.0 - 16.0 g/dL    Hematocrit 40.5 37.0 - 47.0 %    MCV 84.0 81.4 - 97.8 fL    MCH 27.0 27.0 - 33.0 pg    MCHC 32.1 (L) 32.2 - 35.5 g/dL    RDW 49.9 35.9 - 50.0 fL    Platelet Count 283 164 - 446 K/uL    MPV 11.3 9.0 - 12.9 fL    Neutrophils-Polys 75.40 (H) 44.00 - 72.00 %    " Lymphocytes 17.50 (L) 22.00 - 41.00 %    Monocytes 5.80 0.00 - 13.40 %    Eosinophils 0.70 0.00 - 6.90 %    Basophils 0.20 0.00 - 1.80 %    Immature Granulocytes 0.40 0.00 - 0.90 %    Nucleated RBC 0.00 0.00 - 0.20 /100 WBC    Neutrophils (Absolute) 10.58 (H) 1.82 - 7.42 K/uL    Lymphs (Absolute) 2.45 1.00 - 4.80 K/uL    Monos (Absolute) 0.81 0.00 - 0.85 K/uL    Eos (Absolute) 0.10 0.00 - 0.51 K/uL    Baso (Absolute) 0.03 0.00 - 0.12 K/uL    Immature Granulocytes (abs) 0.05 0.00 - 0.11 K/uL    NRBC (Absolute) 0.00 K/uL   Complete Metabolic Panel (CMP)   Result Value Ref Range    Sodium 138 135 - 145 mmol/L    Potassium 4.1 3.6 - 5.5 mmol/L    Chloride 102 96 - 112 mmol/L    Co2 25 20 - 33 mmol/L    Anion Gap 11.0 7.0 - 16.0    Glucose 93 65 - 99 mg/dL    Bun 15 8 - 22 mg/dL    Creatinine 0.98 0.50 - 1.40 mg/dL    Calcium 9.4 8.5 - 10.5 mg/dL    Correct Calcium 9.2 8.5 - 10.5 mg/dL    AST(SGOT) 12 12 - 45 U/L    ALT(SGPT) 12 2 - 50 U/L    Alkaline Phosphatase 132 (H) 30 - 99 U/L    Total Bilirubin 0.7 0.1 - 1.5 mg/dL    Albumin 4.3 3.2 - 4.9 g/dL    Total Protein 7.3 6.0 - 8.2 g/dL    Globulin 3.0 1.9 - 3.5 g/dL    A-G Ratio 1.4 g/dL   Troponins NOW   Result Value Ref Range    Troponin T 28 (H) 6 - 19 ng/L   Urinalysis    Specimen: Urine   Result Value Ref Range    Color Yellow     Character Clear     Specific Gravity 1.010 <1.035    Ph 6.0 5.0 - 8.0    Glucose Negative Negative mg/dL    Ketones Negative Negative mg/dL    Protein Negative Negative mg/dL    Bilirubin Negative Negative    Urobilinogen, Urine 0.2 Negative    Nitrite Negative Negative    Leukocyte Esterase Negative Negative    Occult Blood Negative Negative    Micro Urine Req see below    PROCALCITONIN   Result Value Ref Range    Procalcitonin 0.08 <0.25 ng/mL   ESTIMATED GFR   Result Value Ref Range    GFR (CKD-EPI) 70 >60 mL/min/1.73 m 2   EKG   Result Value Ref Range    Report       [facility]    Test Date:   2023  Pt Name:    ADELINA MILLAN                Department: ER  MRN:        [id number]                      Room:  Gender:     Female                       Technician: [id number]  :        [id number]                   Requested By:ER TRIAGE PROTOCOL  Order #:    [id number]                    Reading MD: ANDREINA GREEN DO    Measurements  Intervals                                Axis  Rate:       67                           P:          77  KS:         135                          QRS:        57  QRSD:       111                          T:          24  QT:         381  QTc:        403    Interpretive Statements  Sinus rhythm  Low voltage, precordial leads  RSR' in V1 or V2, right VCD or RVH  Baseline wander in lead(s) I  Compared to ECG 2023 09:27:54  Right ventricular hypertrophy now present  Atrial premature complex(es) no longer present  Electronically Signed On 2023 22:39:18 PST by ANDREINA GREEN,      POC CoV-2, FLU A/B, RSV by PCR   Result Value Ref Range    POC Influenza A RNA, PCR Negative Negative    POC Influenza B RNA, PCR Negative Negative    POC RSV, by PCR Negative Negative    POC SARS-CoV-2, PCR NotDetected      RADIOLOGY  I have independently interpreted the diagnostic imaging associated with this visit and am waiting the final reading from the radiologist.   My preliminary interpretation is as follows:   Chest x-ray: Hazy bases.  No gross consolidation.  Radiologist interpretation:   DX-CHEST-PORTABLE (1 VIEW)   Final Result         1.  Hazy left lower lobe opacity suggests subtle infiltrate.   2.  Trace left pleural effusion          COURSE & MEDICAL DECISION MAKING    ED Observation Status? Yes; I am placing the patient in to an observation status due to a diagnostic uncertainty as well as therapeutic intensity. Patient placed in observation status at 10:09 PM, 2023.     Observation plan is as follows: Labs, imaging, symptom control before final disposition can be made      Upon Reevaluation, the patient's condition has: Improved; and will be discharged.    Patient discharged from ED Observation status at 12:23 AM  (Time) 12/20/23 (Date).     INITIAL ASSESSMENT, COURSE AND PLAN  Care Narrative:   Seen evaluated bedside.  No acute respiratory distress.  Normal oxygenation on room air despite history of 2 L by nasal cannula.  She does have slight diminished lung sounds, and expiratory wheeze, this may be her baseline but will add DuoNeb, Solu-Medrol due to URI symptoms and exacerbation in the setting.  Add labs, x-ray.  She also is complaining of right greater than left flank pain worse with urination.  Add urinalysis.  No reproducible discomfort on exam.    Improved aeration, resolution of wheezes after DuoNeb.    Nonspecific leukocytosis, WBC 14.0, mild leftward shift without bandemia.  No electrolyte derangement.  Baseline troponin, patient denies chest pain during this evaluation.  EKG without ischemia, unchanged from prior.  Viral studies negative.  Urinalysis unremarkable.  Chest x-ray does suggest left lower lobe infiltrate.  In the setting of COPD exacerbation, upper respiratory infection she will be treated empirically with azithromycin for atypical pneumonia.    Desaturation down to 88% while asleep.  This is reportedly baseline for patient, she does wear 2 L by nasal cannula at home for sleep.  Oxygen saturations returned to baseline with such.    ADDITIONAL PROBLEM LIST  Paroxysmal atrial fibrillation  COPD    DISPOSITION AND DISCUSSIONS  ED evaluation most consistent with upper respiratory infection, bronchitis, complicated by exacerbation of her COPD.  However improved lung aeration after DuoNeb, Solu-Medrol.  Only desaturation noted while asleep, history of the same on home oxygen is not for such.  X-ray suggest developing left lower lobe infiltrate, in the setting she will be treated empirically with azithromycin.  No clinical evidence for sepsis.  Urinalysis is  unremarkable, abdominal exam is benign, cannot otherwise explain the reported flank pain but is not reproducible or persistent during this time.  She is tolerating oral fluids, bears weight independently.  Stable for return to her group home.    Discussion of management with other [facility] or appropriate source(s): Social Work [facility] for disposition, right      Escalation of care considered, and ultimately not performed:acute inpatient care management, however at this time, the patient is most appropriate for outpatient management    The patient is stable for discharge home, anticipatory guidance provided, prednisone for 5 days, azithromycin for 5 days, continue other home medications as previously indicated, close follow-up is encouraged and strict return instructions have been discussed. Patient is agreeable to the disposition and plan.      FINAL DIAGNOSIS  1. Upper respiratory tract infection, unspecified type    2. Pneumonia of left lower lobe due to infectious organism    3. Acute exacerbation of chronic obstructive pulmonary disease (COPD) (HCC)           Electronically signed by: Candis Cassidy D.O., 12/19/2023 10:20 PM

## 2023-12-20 NOTE — ED NOTES
Pt ready for discharge, instructions given, verbalizes understanding  Called [facility] for pt and secured transport, escorted pt to the lobby to await ride home.

## 2023-12-20 NOTE — ED TRIAGE NOTES
"Chief Complaint   Patient presents with    Flu Like Symptoms     Pt states she has had a sore throat, cough, subjective fever x5 days.     Chest Pain     L sided chest pain x1 hour     Pt to triage from [facility] with above complaints. Pt BIB EMS from [facility]. Pt breathing with even unlabored respirations.     Protocol ordered. Pt educated on triage process, placed back in [facility], and instructed to inform staff of any changes.     /65   Pulse 63   Temp 36.4 °C (97.6 °F) (Temporal)   Resp 17   Ht 1.575 m (5' 2\")   Wt 124 kg (273 lb 13 oz)   LMP  (LMP Unknown)   SpO2 96%   BMI 50.08 kg/m²     "

## 2023-12-29 ENCOUNTER — HOSPITAL ENCOUNTER (EMERGENCY)
Facility: MEDICAL CENTER | Age: 48
End: 2023-12-29
Attending: STUDENT IN AN ORGANIZED HEALTH CARE EDUCATION/TRAINING PROGRAM
Payer: MEDICAID

## 2023-12-29 ENCOUNTER — APPOINTMENT (OUTPATIENT)
Dept: RADIOLOGY | Facility: MEDICAL CENTER | Age: 48
End: 2023-12-29
Attending: STUDENT IN AN ORGANIZED HEALTH CARE EDUCATION/TRAINING PROGRAM
Payer: MEDICAID

## 2023-12-29 ENCOUNTER — NON-PROVIDER VISIT (OUTPATIENT)
Dept: CARDIOLOGY | Facility: MEDICAL CENTER | Age: 48
End: 2023-12-29

## 2023-12-29 VITALS
RESPIRATION RATE: 21 BRPM | OXYGEN SATURATION: 94 % | HEART RATE: 71 BPM | BODY MASS INDEX: 50.78 KG/M2 | DIASTOLIC BLOOD PRESSURE: 70 MMHG | HEIGHT: 61 IN | WEIGHT: 268.96 LBS | SYSTOLIC BLOOD PRESSURE: 111 MMHG | TEMPERATURE: 98.2 F

## 2023-12-29 DIAGNOSIS — J44.1 ACUTE EXACERBATION OF CHRONIC OBSTRUCTIVE PULMONARY DISEASE (COPD) (HCC): ICD-10-CM

## 2023-12-29 DIAGNOSIS — B34.9 VIRAL ILLNESS: ICD-10-CM

## 2023-12-29 PROCEDURE — 71045 X-RAY EXAM CHEST 1 VIEW: CPT | Performed by: RADIOLOGY

## 2023-12-29 PROCEDURE — C9803 HOPD COVID-19 SPEC COLLECT: HCPCS

## 2023-12-29 PROCEDURE — 36415 COLL VENOUS BLD VENIPUNCTURE: CPT

## 2023-12-29 PROCEDURE — 0241U HCHG SARS-COV-2 COVID-19 NFCT DS RESP RNA 4 TRGT ED POC: CPT

## 2023-12-29 PROCEDURE — A9270 NON-COVERED ITEM OR SERVICE: HCPCS | Mod: UD | Performed by: STUDENT IN AN ORGANIZED HEALTH CARE EDUCATION/TRAINING PROGRAM

## 2023-12-29 PROCEDURE — 700102 HCHG RX REV CODE 250 W/ 637 OVERRIDE(OP): Mod: UD | Performed by: STUDENT IN AN ORGANIZED HEALTH CARE EDUCATION/TRAINING PROGRAM

## 2023-12-29 PROCEDURE — 94640 AIRWAY INHALATION TREATMENT: CPT

## 2023-12-29 PROCEDURE — 99284 EMERGENCY DEPT VISIT MOD MDM: CPT

## 2023-12-29 PROCEDURE — 71045 X-RAY EXAM CHEST 1 VIEW: CPT

## 2023-12-29 PROCEDURE — 93298 REM INTERROG DEV EVAL SCRMS: CPT | Performed by: INTERNAL MEDICINE

## 2023-12-29 PROCEDURE — 700101 HCHG RX REV CODE 250: Mod: UD | Performed by: STUDENT IN AN ORGANIZED HEALTH CARE EDUCATION/TRAINING PROGRAM

## 2023-12-29 RX ORDER — DEXAMETHASONE 4 MG/1
12 TABLET ORAL ONCE
Status: COMPLETED | OUTPATIENT
Start: 2023-12-29 | End: 2023-12-29

## 2023-12-29 RX ORDER — BENZONATATE 100 MG/1
100 CAPSULE ORAL 3 TIMES DAILY PRN
Qty: 60 CAPSULE | Refills: 0 | Status: SHIPPED | OUTPATIENT
Start: 2023-12-29 | End: 2024-03-06

## 2023-12-29 RX ORDER — BENZONATATE 100 MG/1
100 CAPSULE ORAL 3 TIMES DAILY PRN
Status: DISCONTINUED | OUTPATIENT
Start: 2023-12-29 | End: 2023-12-29

## 2023-12-29 RX ORDER — BENZONATATE 100 MG/1
100 CAPSULE ORAL ONCE
Status: COMPLETED | OUTPATIENT
Start: 2023-12-29 | End: 2023-12-29

## 2023-12-29 RX ADMIN — DEXAMETHASONE 12 MG: 4 TABLET ORAL at 18:26

## 2023-12-29 RX ADMIN — IPRATROPIUM BROMIDE 0.5 MG: 0.5 SOLUTION RESPIRATORY (INHALATION) at 18:39

## 2023-12-29 RX ADMIN — ALBUTEROL SULFATE 5 MG: 2.5 SOLUTION RESPIRATORY (INHALATION) at 18:40

## 2023-12-29 RX ADMIN — BENZONATATE 100 MG: 100 CAPSULE ORAL at 18:41

## 2023-12-29 ASSESSMENT — FIBROSIS 4 INDEX: FIB4 SCORE: 0.61

## 2023-12-29 NOTE — ED TRIAGE NOTES
Patient to ED with complaints of flu like symptoms x1 weeks. She report +fever last weekend. She reports cough with +yellow sputum production. Poor appetite. She reports SOB and wheezing. HX of COPD. Takes inhalers regularly at home.

## 2023-12-30 NOTE — ED PROVIDER NOTES
ED Provider Note    CHIEF COMPLAINT  Chief Complaint   Patient presents with    Flu Like Symptoms    Cough       EXTERNAL RECORDS REVIEWED  External ED Note ED visit in December earlier this month where she was diagnosed with pneumonia and discharged with azithromycin.    HPI/ROS  LIMITATION TO HISTORY   Select: : None  OUTSIDE HISTORIAN(S):  None    Lorene Haro is a 48 y.o. female who presents due to cough for the past week and flulike symptoms.  Patient notes she lives in a group home and was advised to get evaluation due to persistent cough that is worse at night.  She does have yellow sputum with her cough.  She has been using her inhalers but not at more frequency for her COPD than typical.  She is finding that they help with her symptoms.  She has not had daily fevers or chills.  She is eating and drinking well.  She is not having shortness of breath or chest pain.    PAST MEDICAL HISTORY   has a past medical history of A-fib ([facility]), MONI (acute kidney injury) ([facility]) (2023), Asthma, Bipolar 2 disorder ([facility]), Chickenpox, Chronic obstructive pulmonary disease (HCC), Hypertension, On home oxygen therapy, Other psychological stress, Schizophrenic disorder ([facility]), and Yeast infection of the skin (2021).    SURGICAL HISTORY   has a past surgical history that includes hysterectomy laparoscopy; colectomy; cholecystectomy; and knee arthroscopy (Left).    FAMILY HISTORY  Family History   Problem Relation Age of Onset    No Known Problems Mother     Cancer Sister        SOCIAL HISTORY  Social History     Tobacco Use    Smoking status: Former     Current packs/day: 0.00     Types: Cigarettes     Quit date: 10/12/2022     Years since quittin.2    Smokeless tobacco: Never   Vaping Use    Vaping Use: Never used   Substance and Sexual Activity    Alcohol use: Not Currently    Drug use: Never    Sexual activity: Not Currently       CURRENT MEDICATIONS  Home Medications       Reviewed by Arlet Uriarte R.N.  "(Registered Nurse) on 12/29/23 at 1553  Med List Status: Partial     Medication Last Dose Status   acetaminophen (TYLENOL) 500 MG Tab  Active   ADVAIR DISKUS 100-50 MCG/ACT AEROSOL POWDER, BREATH ACTIVATED  Active   albuterol 108 (90 Base) MCG/ACT Aero Soln inhalation aerosol  Active   aripiprazole (ABILIFY) 30 MG tablet  Active   aspirin 81 MG EC tablet  Active   atorvastatin (LIPITOR) 20 MG Tab  Active   fluticasone (FLONASE) 50 MCG/ACT nasal spray  Active   furosemide (LASIX) 20 MG Tab  Active   ibuprofen (MOTRIN) 600 MG Tab  Active   Lidocaine 4 % Patch  Active   methocarbamol (ROBAXIN) 500 MG Tab  Active   montelukast (SINGULAIR) 10 MG Tab  Active                    ALLERGIES  Allergies   Allergen Reactions    Penicillin G Rash and Itching     pt reports that she gets a rash all over her body and gets itchy    Amoxicillin Rash and Itching     Tolerates cephalosporins, pt reports that she gets a rash all over her body and gets itchy       PHYSICAL EXAM  VITAL SIGNS: /70   Pulse 71   Temp 36.8 °C (98.2 °F) (Temporal)   Resp (!) 21   Ht 1.549 m (5' 1\")   Wt 122 kg (268 lb 15.4 oz)   LMP  (LMP Unknown)   SpO2 94%   BMI 50.82 kg/m²    Constitutional: Awake and alert. No acute distress.  Head: NCAT.  HEENT: Normal Conjunctiva. PERRLA.  Neck: Grossly normal range of motion. Airway midline.  Cardiovascular: Normal heart rate, Normal rhythm.  Thorax & Lungs: No respiratory distress.  Trace inspiratory and expiratory wheeze in all fields.  Patient is talking in full sentences and not tachypneic.  Have a productive sounding cough.  Abdomen: Normal inspection. Nontender. Nondistended  Skin: No obvious rash.  Back: No tenderness, No CVA tenderness.   Musculoskeletal: No obvious deformity. Moves all extremities Well.  Neurologic: A&Ox3.   Psychiatric: Mood and affect are appropriate for situation.    LABS  Results for orders placed or performed during the hospital encounter of 12/29/23   POC CoV-2, FLU A/B, RSV " by PCR   Result Value Ref Range    POC Influenza A RNA, PCR Negative Negative    POC Influenza B RNA, PCR Negative Negative    POC RSV, by PCR Negative Negative    POC SARS-CoV-2, PCR NotDetected          RADIOLOGY  I have independently interpreted the diagnostic imaging associated with this visit and am waiting the final reading from the radiologist.   My preliminary interpretation is as follows: no opacity, no effusion  Radiologist interpretation:   DX-CHEST-PORTABLE (1 VIEW)   Final Result      No acute cardiac or pulmonary abnormalities are identified.            COURSE & MEDICAL DECISION MAKING    ED Observation Status? No; Patient does not meet criteria for ED Observation.     INITIAL ASSESSMENT, COURSE AND PLAN  Care Narrative:   48-year-old female history of COPD here from [facility] for evaluation of cough that she has had for a week with concern of infectious process.  Afebrile,  reassuring vitals and without hypoxia.  On exam she is talking in full sentences, no respiratory distress, she has trace inspiratory and expiratory wheeze.  Differential includes COPD exacerbation, viral illness, pneumonia.  As she was diagnosed with pneumonia a week ago and started on azithromycin we will repeat a chest x-ray to confirm resolution.  Viral swab is negative for viral infections  Patient received a breathing treatment and steroids for mild COPD exacerbation.  On reassessment she is feeling much improved.  She was informed of her negative viral testing and reassuring chest x-ray.  She was discharged back to prior living conditions and assures me she has plenty of inhalers at home and refills.  Advised to take over-the-counter medications as needed for her cough and congestion symptoms and use her inhalers if she is having shortness of breath.      ADDITIONAL PROBLEM LIST  None  DISPOSITION AND DISCUSSIONS  I have discussed management of the patient with the following physicians and JAY's:  None    Discussion of  management with other [facility] or appropriate source(s): None     Escalation of care considered, and ultimately not performed:acute inpatient care management, however at this time, the patient is most appropriate for outpatient management    Barriers to care at this time, including but not limited to: Patient does not have established PCP, Patient lacks transportation , and Patient lacks financial resources.     Decision tools and prescription drugs considered including, but not limited to: Medication modification shows me is plenty of COPD inhalers.  We discussed over-the-counter medications for her symptoms. .    FINAL DIAGNOSIS  1. Acute exacerbation of chronic obstructive pulmonary disease (COPD) (HCC) Acute   2. Viral illness Acute          Electronically signed by: Julee Encarnacion D.O., 12/29/2023 6:11 PM

## 2023-12-30 NOTE — ED NOTES
Patient states she has significant improvement in effort of breathing and reduction of cough after interventions.

## 2023-12-30 NOTE — ED NOTES
Pt discharged, all appropriate hospital equipment removed (IV, monitor, pulse ox, etc.). Pt left unit via wheelchair with RN assistance to ED lobby for transport home with friend. Personal belongings with pt when leaving unit. Pt given discharge instructions prior to leaving unit including where to  prescriptions and when to follow-up; verbalizes understanding. Pt informed to return to ED if symptoms worsen/return or altered status develop. Copy of discharge instructions signed and turned into DC basket and copy sent with pt.

## 2023-12-30 NOTE — DISCHARGE INSTRUCTIONS
Please take the cough medication that was prescribed as needed for your cough.  Today you were negative for COVID flu and RSV.  Your chest x-ray does not show pneumonia.  If you are finding you are short of breath please continue using your inhalers for COPD.  You were mildly wheezy today which is why I gave you a breathing treatment and some steroids for your COPD.    With your regular doctor if your symptoms are not better in 5 to 7 days.

## 2024-01-02 ENCOUNTER — APPOINTMENT (OUTPATIENT)
Dept: RADIOLOGY | Facility: MEDICAL CENTER | Age: 49
End: 2024-01-02
Attending: STUDENT IN AN ORGANIZED HEALTH CARE EDUCATION/TRAINING PROGRAM
Payer: MEDICAID

## 2024-01-02 ENCOUNTER — HOSPITAL ENCOUNTER (EMERGENCY)
Facility: MEDICAL CENTER | Age: 49
End: 2024-01-03
Attending: STUDENT IN AN ORGANIZED HEALTH CARE EDUCATION/TRAINING PROGRAM
Payer: MEDICAID

## 2024-01-02 DIAGNOSIS — J18.9 COMMUNITY ACQUIRED PNEUMONIA, UNSPECIFIED LATERALITY: ICD-10-CM

## 2024-01-02 LAB
ALBUMIN SERPL BCP-MCNC: 4.2 G/DL (ref 3.2–4.9)
ALBUMIN/GLOB SERPL: 1.4 G/DL
ALP SERPL-CCNC: 121 U/L (ref 30–99)
ALT SERPL-CCNC: 15 U/L (ref 2–50)
ANION GAP SERPL CALC-SCNC: 11 MMOL/L (ref 7–16)
APPEARANCE UR: CLEAR
AST SERPL-CCNC: 10 U/L (ref 12–45)
BASOPHILS # BLD AUTO: 0.2 % (ref 0–1.8)
BASOPHILS # BLD: 0.04 K/UL (ref 0–0.12)
BILIRUB SERPL-MCNC: 0.8 MG/DL (ref 0.1–1.5)
BILIRUB UR QL STRIP.AUTO: NEGATIVE
BUN SERPL-MCNC: 16 MG/DL (ref 8–22)
CALCIUM ALBUM COR SERPL-MCNC: 9.1 MG/DL (ref 8.5–10.5)
CALCIUM SERPL-MCNC: 9.3 MG/DL (ref 8.5–10.5)
CHLORIDE SERPL-SCNC: 100 MMOL/L (ref 96–112)
CO2 SERPL-SCNC: 26 MMOL/L (ref 20–33)
COLOR UR: YELLOW
CREAT SERPL-MCNC: 0.86 MG/DL (ref 0.5–1.4)
EOSINOPHIL # BLD AUTO: 0.13 K/UL (ref 0–0.51)
EOSINOPHIL NFR BLD: 0.8 % (ref 0–6.9)
ERYTHROCYTE [DISTWIDTH] IN BLOOD BY AUTOMATED COUNT: 50 FL (ref 35.9–50)
GFR SERPLBLD CREATININE-BSD FMLA CKD-EPI: 82 ML/MIN/1.73 M 2
GLOBULIN SER CALC-MCNC: 2.9 G/DL (ref 1.9–3.5)
GLUCOSE SERPL-MCNC: 99 MG/DL (ref 65–99)
GLUCOSE UR STRIP.AUTO-MCNC: NEGATIVE MG/DL
HCT VFR BLD AUTO: 44.1 % (ref 37–47)
HGB BLD-MCNC: 13.9 G/DL (ref 12–16)
IMM GRANULOCYTES # BLD AUTO: 0.06 K/UL (ref 0–0.11)
IMM GRANULOCYTES NFR BLD AUTO: 0.4 % (ref 0–0.9)
KETONES UR STRIP.AUTO-MCNC: NEGATIVE MG/DL
LACTATE SERPL-SCNC: 1.5 MMOL/L (ref 0.5–2)
LEUKOCYTE ESTERASE UR QL STRIP.AUTO: NEGATIVE
LIPASE SERPL-CCNC: 27 U/L (ref 11–82)
LYMPHOCYTES # BLD AUTO: 2.76 K/UL (ref 1–4.8)
LYMPHOCYTES NFR BLD: 16.5 % (ref 22–41)
MCH RBC QN AUTO: 26.6 PG (ref 27–33)
MCHC RBC AUTO-ENTMCNC: 31.5 G/DL (ref 32.2–35.5)
MCV RBC AUTO: 84.5 FL (ref 81.4–97.8)
MICRO URNS: NORMAL
MONOCYTES # BLD AUTO: 0.95 K/UL (ref 0–0.85)
MONOCYTES NFR BLD AUTO: 5.7 % (ref 0–13.4)
NEUTROPHILS # BLD AUTO: 12.8 K/UL (ref 1.82–7.42)
NEUTROPHILS NFR BLD: 76.4 % (ref 44–72)
NITRITE UR QL STRIP.AUTO: NEGATIVE
NRBC # BLD AUTO: 0 K/UL
NRBC BLD-RTO: 0 /100 WBC (ref 0–0.2)
PH UR STRIP.AUTO: 6 [PH] (ref 5–8)
PLATELET # BLD AUTO: 280 K/UL (ref 164–446)
PMV BLD AUTO: 11.4 FL (ref 9–12.9)
POTASSIUM SERPL-SCNC: 3.5 MMOL/L (ref 3.6–5.5)
PROT SERPL-MCNC: 7.1 G/DL (ref 6–8.2)
PROT UR QL STRIP: NEGATIVE MG/DL
RBC # BLD AUTO: 5.22 M/UL (ref 4.2–5.4)
RBC UR QL AUTO: NEGATIVE
SODIUM SERPL-SCNC: 137 MMOL/L (ref 135–145)
SP GR UR STRIP.AUTO: <=1.005
UROBILINOGEN UR STRIP.AUTO-MCNC: 0.2 MG/DL
WBC # BLD AUTO: 16.7 K/UL (ref 4.8–10.8)

## 2024-01-02 PROCEDURE — 83690 ASSAY OF LIPASE: CPT

## 2024-01-02 PROCEDURE — 99284 EMERGENCY DEPT VISIT MOD MDM: CPT

## 2024-01-02 PROCEDURE — 80053 COMPREHEN METABOLIC PANEL: CPT

## 2024-01-02 PROCEDURE — 700102 HCHG RX REV CODE 250 W/ 637 OVERRIDE(OP): Mod: UD | Performed by: STUDENT IN AN ORGANIZED HEALTH CARE EDUCATION/TRAINING PROGRAM

## 2024-01-02 PROCEDURE — 81003 URINALYSIS AUTO W/O SCOPE: CPT

## 2024-01-02 PROCEDURE — 85025 COMPLETE CBC W/AUTO DIFF WBC: CPT

## 2024-01-02 PROCEDURE — A9270 NON-COVERED ITEM OR SERVICE: HCPCS | Mod: UD | Performed by: STUDENT IN AN ORGANIZED HEALTH CARE EDUCATION/TRAINING PROGRAM

## 2024-01-02 PROCEDURE — 700105 HCHG RX REV CODE 258: Mod: UD | Performed by: STUDENT IN AN ORGANIZED HEALTH CARE EDUCATION/TRAINING PROGRAM

## 2024-01-02 PROCEDURE — 36415 COLL VENOUS BLD VENIPUNCTURE: CPT

## 2024-01-02 PROCEDURE — 71045 X-RAY EXAM CHEST 1 VIEW: CPT

## 2024-01-02 PROCEDURE — 87040 BLOOD CULTURE FOR BACTERIA: CPT

## 2024-01-02 PROCEDURE — 83605 ASSAY OF LACTIC ACID: CPT

## 2024-01-02 PROCEDURE — 93005 ELECTROCARDIOGRAM TRACING: CPT | Performed by: STUDENT IN AN ORGANIZED HEALTH CARE EDUCATION/TRAINING PROGRAM

## 2024-01-02 RX ORDER — ACETAMINOPHEN 500 MG
1000 TABLET ORAL ONCE
Status: COMPLETED | OUTPATIENT
Start: 2024-01-02 | End: 2024-01-02

## 2024-01-02 RX ORDER — SODIUM CHLORIDE, SODIUM LACTATE, POTASSIUM CHLORIDE, CALCIUM CHLORIDE 600; 310; 30; 20 MG/100ML; MG/100ML; MG/100ML; MG/100ML
1000 INJECTION, SOLUTION INTRAVENOUS ONCE
Status: COMPLETED | OUTPATIENT
Start: 2024-01-02 | End: 2024-01-03

## 2024-01-02 RX ORDER — DOXYCYCLINE 100 MG/1
100 TABLET ORAL ONCE
Status: COMPLETED | OUTPATIENT
Start: 2024-01-02 | End: 2024-01-03

## 2024-01-02 RX ADMIN — ACETAMINOPHEN 1000 MG: 500 TABLET ORAL at 23:09

## 2024-01-02 RX ADMIN — SODIUM CHLORIDE, POTASSIUM CHLORIDE, SODIUM LACTATE AND CALCIUM CHLORIDE 1000 ML: 600; 310; 30; 20 INJECTION, SOLUTION INTRAVENOUS at 23:09

## 2024-01-02 ASSESSMENT — PAIN DESCRIPTION - PAIN TYPE: TYPE: ACUTE PAIN

## 2024-01-02 ASSESSMENT — FIBROSIS 4 INDEX: FIB4 SCORE: 0.61

## 2024-01-02 ASSESSMENT — PAIN DESCRIPTION - DESCRIPTORS: DESCRIPTORS: SHARP

## 2024-01-02 NOTE — CARDIAC REMOTE MONITOR - SCAN
Device transmission reviewed. Device demonstrated appropriate function.       Electronically Signed by: Rodney Alberto M.D.    1/2/2024  11:18 AM

## 2024-01-03 ENCOUNTER — TELEPHONE (OUTPATIENT)
Dept: CARDIOLOGY | Facility: MEDICAL CENTER | Age: 49
End: 2024-01-03
Payer: MEDICAID

## 2024-01-03 ENCOUNTER — HOSPITAL ENCOUNTER (EMERGENCY)
Facility: MEDICAL CENTER | Age: 49
End: 2024-01-03
Attending: EMERGENCY MEDICINE
Payer: MEDICAID

## 2024-01-03 VITALS
OXYGEN SATURATION: 93 % | HEIGHT: 61 IN | WEIGHT: 268 LBS | DIASTOLIC BLOOD PRESSURE: 58 MMHG | SYSTOLIC BLOOD PRESSURE: 118 MMHG | RESPIRATION RATE: 20 BRPM | TEMPERATURE: 98.2 F | HEART RATE: 68 BPM | BODY MASS INDEX: 50.6 KG/M2

## 2024-01-03 VITALS
WEIGHT: 268 LBS | SYSTOLIC BLOOD PRESSURE: 112 MMHG | BODY MASS INDEX: 50.6 KG/M2 | HEIGHT: 61 IN | TEMPERATURE: 97.4 F | RESPIRATION RATE: 20 BRPM | OXYGEN SATURATION: 93 % | DIASTOLIC BLOOD PRESSURE: 60 MMHG | HEART RATE: 70 BPM

## 2024-01-03 DIAGNOSIS — J18.9 ATYPICAL PNEUMONIA: ICD-10-CM

## 2024-01-03 DIAGNOSIS — J12.82 PNEUMONIA DUE TO COVID-19 VIRUS: ICD-10-CM

## 2024-01-03 DIAGNOSIS — I48.0 PAROXYSMAL ATRIAL FIBRILLATION (HCC): ICD-10-CM

## 2024-01-03 DIAGNOSIS — U07.1 PNEUMONIA DUE TO COVID-19 VIRUS: ICD-10-CM

## 2024-01-03 LAB
EKG IMPRESSION: NORMAL
EKG IMPRESSION: NORMAL
INR PPP: 1.07 (ref 0.87–1.13)
PROTHROMBIN TIME: 14 SEC (ref 12–14.6)

## 2024-01-03 PROCEDURE — 99284 EMERGENCY DEPT VISIT MOD MDM: CPT

## 2024-01-03 PROCEDURE — 700102 HCHG RX REV CODE 250 W/ 637 OVERRIDE(OP): Mod: UD | Performed by: STUDENT IN AN ORGANIZED HEALTH CARE EDUCATION/TRAINING PROGRAM

## 2024-01-03 PROCEDURE — 87040 BLOOD CULTURE FOR BACTERIA: CPT

## 2024-01-03 PROCEDURE — 93005 ELECTROCARDIOGRAM TRACING: CPT

## 2024-01-03 PROCEDURE — 700102 HCHG RX REV CODE 250 W/ 637 OVERRIDE(OP): Mod: UD | Performed by: EMERGENCY MEDICINE

## 2024-01-03 PROCEDURE — 93005 ELECTROCARDIOGRAM TRACING: CPT | Performed by: EMERGENCY MEDICINE

## 2024-01-03 PROCEDURE — A9270 NON-COVERED ITEM OR SERVICE: HCPCS | Mod: UD | Performed by: STUDENT IN AN ORGANIZED HEALTH CARE EDUCATION/TRAINING PROGRAM

## 2024-01-03 PROCEDURE — A9270 NON-COVERED ITEM OR SERVICE: HCPCS | Mod: UD | Performed by: EMERGENCY MEDICINE

## 2024-01-03 PROCEDURE — 36415 COLL VENOUS BLD VENIPUNCTURE: CPT

## 2024-01-03 PROCEDURE — 85610 PROTHROMBIN TIME: CPT

## 2024-01-03 RX ORDER — ALBUTEROL SULFATE 90 UG/1
2 AEROSOL, METERED RESPIRATORY (INHALATION) ONCE
Status: COMPLETED | OUTPATIENT
Start: 2024-01-03 | End: 2024-01-03

## 2024-01-03 RX ORDER — DOXYCYCLINE 100 MG/1
100 CAPSULE ORAL 2 TIMES DAILY
Qty: 10 CAPSULE | Refills: 0 | Status: ACTIVE | OUTPATIENT
Start: 2024-01-03 | End: 2024-01-08

## 2024-01-03 RX ADMIN — DOXYCYCLINE 100 MG: 100 TABLET, FILM COATED ORAL at 01:01

## 2024-01-03 RX ADMIN — APIXABAN 5 MG: 5 TABLET, FILM COATED ORAL at 15:39

## 2024-01-03 RX ADMIN — ALBUTEROL SULFATE 2 PUFF: 90 AEROSOL, METERED RESPIRATORY (INHALATION) at 14:56

## 2024-01-03 ASSESSMENT — FIBROSIS 4 INDEX: FIB4 SCORE: 0.46

## 2024-01-03 NOTE — ED NOTES
Pt noted to have a bout of hypotension  Pressure rechecked and verified. ERP aware  PIV placed  EKG obtained  Bolus hung  Lab work sent and pt resting in bed with call light within reach

## 2024-01-03 NOTE — ED PROVIDER NOTES
ED Provider Note    CHIEF COMPLAINT  Chief Complaint   Patient presents with    Sent by MD     Cardiologist told patient to present to ER for recurrence of Afib on remote cardiac monitor. Needs anticoagulation.        LIMITATION TO HISTORY   Patient is an uncertain historian    HPI    Lorene Haro is a 48 y.o. female who presents to the Emergency Department by ambulance after cardiology reportedly asked her to return to restart Eliquis for A-fib.  She was here last night for weakness and diarrhea and was diagnosed with bronchopneumonia.  She has taken a dose of antibiotics and was prescribed antibiotics.  She formerly used to be on it Eliquis and had one-time Xarelto but those were discontinued 2 to 3 years ago.  She denies palpitations or chest pain but does have shortness of breath.  Denies history of asthma but she did have an albuterol inhaler in the past.    OUTSIDE HISTORIAN(S):  None    EXTERNAL RECORDS REVIEWED  Cardiology note from today reviewed and the patient was called and told to come back to restart Eliquis.    REVIEW OF SYSTEMS  Pertinent positives include: Weakness, diarrhea, cough, shortness of breath, palpitations.  Pertinent negatives include: Stroke, TIA, chest pain, current blood thinner use.      PAST MEDICAL HISTORY  Past Medical History:   Diagnosis Date    A-fib ([facility])     MONI (acute kidney injury) ([facility]) 2023    Asthma     Bipolar 2 disorder (HCC)     Chickenpox     Chronic obstructive pulmonary disease (HCC)     Hypertension     On home oxygen therapy     Other psychological stress     Schizophrenic disorder ([facility])     Yeast infection of the skin 2021       FAMILY HISTORY  Family History   Problem Relation Age of Onset    No Known Problems Mother     Cancer Sister        SOCIAL HISTORY  Social History     Tobacco Use    Smoking status: Former     Current packs/day: 0.00     Types: Cigarettes     Quit date: 10/12/2022     Years since quittin.2    Smokeless tobacco: Never    Vaping Use    Vaping Use: Never used   Substance Use Topics    Alcohol use: Not Currently    Drug use: Never     Social History     Substance and Sexual Activity   Drug Use Never       SURGICAL HISTORY  Past Surgical History:   Procedure Laterality Date    CHOLECYSTECTOMY      COLECTOMY      HYSTERECTOMY LAPAROSCOPY      KNEE ARTHROSCOPY Left        CURRENT MEDICATIONS  No current facility-administered medications for this encounter.    Current Outpatient Medications:     doxycycline (MONODOX) 100 MG capsule, Take 1 Capsule by mouth 2 times a day for 5 days., Disp: 10 Capsule, Rfl: 0    apixaban (ELIQUIS) 5mg Tab, Take 1 Tablet by mouth 2 times a day., Disp: 60 Tablet, Rfl: 0    benzonatate (TESSALON) 100 MG Cap, Take 1 Capsule by mouth 3 times a day as needed for Cough., Disp: 60 Capsule, Rfl: 0    Lidocaine 4 % Patch, Apply 1 Each topically every 12 hours., Disp: 1 Patch, Rfl: 0    methocarbamol (ROBAXIN) 500 MG Tab, Take 1 Tablet by mouth 3 times a day., Disp: 21 Tablet, Rfl: 0    ibuprofen (MOTRIN) 600 MG Tab, Take 1 Tablet by mouth every 6 hours as needed for Mild Pain or Moderate Pain., Disp: 30 Tablet, Rfl: 0    acetaminophen (TYLENOL) 500 MG Tab, Take 1-2 Tablets by mouth every 6 hours as needed for Moderate Pain., Disp: 30 Tablet, Rfl: 0    albuterol 108 (90 Base) MCG/ACT Aero Soln inhalation aerosol, Inhale 2 Puffs every 6 hours as needed for Shortness of Breath., Disp: 8.5 g, Rfl: 0    fluticasone (FLONASE) 50 MCG/ACT nasal spray, Administer 2 Sprays into each nostril every morning., Disp: , Rfl:     ADVAIR DISKUS 100-50 MCG/ACT AEROSOL POWDER, BREATH ACTIVATED, Inhale 1 Puff 2 times a day., Disp: , Rfl:     aspirin 81 MG EC tablet, Take 81 mg by mouth every day., Disp: , Rfl:     furosemide (LASIX) 20 MG Tab, Take 1 Tablet by mouth every day., Disp: 90 Tablet, Rfl: 0    aripiprazole (ABILIFY) 30 MG tablet, Take 1 Tablet by mouth every morning. Indications: Major Depressive Disorder, Disp: 30 Tablet,  "Rfl: 0    atorvastatin (LIPITOR) 20 MG Tab, Take 1 Tablet by mouth at bedtime., Disp: 30 Tablet, Rfl: 0    montelukast (SINGULAIR) 10 MG Tab, Take 1 Tablet by mouth every evening., Disp: 30 Tablet, Rfl: 0    ALLERGIES  Allergies   Allergen Reactions    Penicillin G Rash and Itching     pt reports that she gets a rash all over her body and gets itchy    Amoxicillin Rash and Itching     Tolerates cephalosporins, pt reports that she gets a rash all over her body and gets itchy       PHYSICAL EXAM  VITAL SIGNS: /66   Pulse 70   Temp 36.2 °C (97.2 °F) (Temporal)   Resp 15   Ht 1.549 m (5' 1\")   Wt 122 kg (268 lb)   LMP  (LMP Unknown)   SpO2 97%   BMI 50.64 kg/m²   Reviewed and afebrile, normal heart rate, no hypoxia on room air  Constitutional: Well developed, Well nourished, elevated BMI, well-appearing.  HENT: Normocephalic, atraumatic, bilateral external ears normal,  Eyes: PERRLA, conjunctiva pink, no scleral icterus.   Cardiovascular: Normal rate, possibly regular rate and rhythm. No murmurs, rubs or gallops.  No dependent edema or calf tenderness  Respiratory: Coarse expiratory rhonchi  Abdominal:  Abdomen soft, non-tender, non distended. No rebound, or guarding.    Skin: No erythema, no rash. No wounds or bruising.       LABS  Reviewed by Me:  CBC reviewed from yesterday at 1126 with a white count of 16.7.  Hemoglobin and platelets normal.  CMP reviewed from January 2 at 9 PM, potassium 3.5, creatinine normal 0.86, AST low at 10 and alk phos high at 121 otherwise CMP normal  No coag studies were available from the last couple of days.    Labs ordered and reviewed by me today below:  Results for orders placed or performed during the hospital encounter of 01/03/24   Prothrombin Time   Result Value Ref Range    PT 14.0 12.0 - 14.6 sec    INR 1.07 0.87 - 1.13       Rhythm Strip: Interpretation by me irregularly irregular at normal rate with baseline a flutter    EKG Interpretation by me    Indication: " Reported atrial fibrillation    Rhythm: normal sinus   Rate: Normal at 63  Axis: normal  Ectopy: Multiple PACs  Conduction: normal  ST/T Waves: no acute change  Q Waves: none  R Wave progression: normal  Hypertrophy changes: Absent    Clinical Impression: Sinus rhythm with PACs    RADIOLOGY  I have independently interpreted the chest x-ray associated with yesterday's visit demonstrating possible left lower lobe infiltrate.  I am awaiting the final reading from the radiologist.     Final Radiology Report  IMPRESSION:     Mild left basilar opacity probably atelectasis with trace left pleural effusion not excluded.           Exam Ended: 01/02/24 22:27           Radiologist interpretation have been reviewed by me.     ED COURSE:    INTERVENTIONS BY ME:  Medications   albuterol inhaler 2 Puff (2 Puffs Inhalation Given 1/3/24 9926)   apixaban (Eliquis) tablet 5 mg (5 mg Oral Given 1/3/24 1539)       4:18 PM - Patient reassessed and response to intervention patient reported improvement in dyspnea but still had coarse rhonchi on repeat evaluation.    MEDICAL DECISION MAKING:  PROBLEMS EVALUATED THIS VISIT:  This patient presents with recurrent paroxysmal atrial fibrillation and will need to be placed back on Eliquis.  Her rate is well-controlled.  She has known underlying pneumonia which is being treated and may have triggered this bout of A-fib.  There is no evidence of clinical stroke.  She had thorough labs yesterday so all that was necessary today was an INR which was normal    RISK:  High given need for anticoagulant which will require monitoring    PLAN:  New Prescriptions    APIXABAN (ELIQUIS) 5MG TAB    Take 1 Tablet by mouth 2 times a day.   Butyryl 2 puffs 4 times daily for cough or shortness of breath    Continue your other normal medications    Atrial fibrillation handout given    Return for worsening shortness of breath, rapid heart rate, weakness, ill appearance    Followup:  [facility]  [address] [place] [address] [id number]  [place] [address]  [phone]  Schedule an appointment as soon as possible for a visit   with the anticoagulation clinic in 1-2 weeks    Follow-up with your cardiologist as well    CONDITION: Stable.     FINAL IMPRESSION  1. Paroxysmal atrial fibrillation (HCC)    2. Pneumonia due to COVID-19 virus    3. Atypical pneumonia    4.      Bronchospasm     Electronically signed by: Tirso Dickson M.D., 1/3/2024 2:32 PM

## 2024-01-03 NOTE — DISCHARGE INSTRUCTIONS
Please take antibiotics as prescribed for treatment of pneumonia.  Follow-up primary care doctor in 1 week for recheck.

## 2024-01-03 NOTE — PROGRESS NOTES
Called patient, had been in hospital last night, discharged and then went into A-Fib. Will start Eliquis.

## 2024-01-03 NOTE — ED NOTES
Pt medicated per MAR    Attempted to draw from IV but unsuccessful. Phlebotomy contacted and at bedside

## 2024-01-03 NOTE — TELEPHONE ENCOUNTER
FYI - remote transmission received via home monitor, patient had AF episode, patient not on OAC at this time.    AF Episode  -Episode Onset: 1/3/2024 @ 4:29 AM  -Duration: ongoing at time of transmission (5:34 AM)  -Patient on OAC?: No

## 2024-01-03 NOTE — ED TRIAGE NOTES
".  Chief Complaint   Patient presents with    Sent by MD     Cardiologist told patient to present to ER for recurrence of Afib on remote cardiac monitor. Needs anticoagulation.        49 yo female brought in by ambulance from her group home to triage for above complaint.      Pt is alert and oriented, speaking in full sentences, follows commands and responds appropriately to questions. No active distress. EKG completed on arrival.      Patient placed back in lobby and educated on triage process. Asked to inform RN of any changes or concerns.     /71   Pulse 64   Temp 36.2 °C (97.2 °F) (Temporal)   Resp 18   Ht 1.549 m (5' 1\")   Wt 122 kg (268 lb)   LMP  (LMP Unknown)   SpO2 93%   BMI 50.64 kg/m²     "

## 2024-01-03 NOTE — ED NOTES
Pt provided with discharge teaching and information was discussed. Pt questions answered. Pt verbalized understanding of importance of follow up with health care provider. Pt verbalized importance to  prescription medication from agreed pharmacy. Pt verbalized understanding of when to return if symptoms worsen. Pt ambulated out of the ED.

## 2024-01-03 NOTE — ED TRIAGE NOTES
"Chief Complaint   Patient presents with    Flank Pain     BIB [facility] from [facility]. Pt consuming large amount of soda/coffee today. C/o bl flank pain and dysuria. Pain rate 4/10. Flank pain is reported to have started 3 days ago.     Upon further assessment: pt c/o left knee pain/foot pain. Right hand pinky digit pain.    47 y/o female ambulatory to triage for above complaint. Protocol lab orders in process.     Pt place in waiting area. Educated on triage process. Pt will inform staff of any medical changes.    BP (!) 149/79   Pulse 72   Temp 36.4 °C (97.5 °F) (Temporal)   Resp 16   Ht 1.549 m (5' 1\")   Wt 122 kg (268 lb)   LMP  (LMP Unknown)   SpO2 97%   BMI 50.64 kg/m²     "

## 2024-01-03 NOTE — ED PROVIDER NOTES
ED Provider Note    CHIEF COMPLAINT  Chief Complaint   Patient presents with    Flank Pain     [place] [facility] [place] Pt consuming large amount of soda/coffee today. C/o bl flank pain and dysuria. Pain rate 4/10. Flank pain is reported to have started 3 days ago.       EXTERNAL RECORDS REVIEWED  Outpatient Notes patient was seen in the emergency department 12/29/2023 with cough and flulike symptoms.  Viral testing and x-ray reassuring at that time.  Diagnosed with COPD exacerbation.    HPI/ROS  LIMITATION TO HISTORY   Select: : None      Lorene Haro is a 48 y.o. female who presents to the emergency department for evaluation of bilateral flank pain, diarrhea, cough and lightheadedness.  Patient reports that she was drinking a lot of soda and coffee today and noted that her blood sugar was high.  She states she then began to feel lightheaded.  She states that she has had cramping in both of her flank area as well having bowel movements and reports multiple episodes of diarrhea for the last few days.  Denies recent antibiotic use.  Denies pain with urination or increased urinary frequency, fever or chills, shortness of breath, chest pain.    PAST MEDICAL HISTORY   has a past medical history of A-fib ([facility]), MONI (acute kidney injury) ([facility]) (8/9/2023), Asthma, Bipolar 2 disorder ([facility]), Chickenpox, Chronic obstructive pulmonary disease ([facility]), Hypertension, On home oxygen therapy, Other psychological stress, Schizophrenic disorder ([facility]), and Yeast infection of the skin (04/01/2021).    SURGICAL HISTORY   has a past surgical history that includes hysterectomy laparoscopy; colectomy; cholecystectomy; and knee arthroscopy (Left).    FAMILY HISTORY  Family History   Problem Relation Age of Onset    No Known Problems Mother     Cancer Sister        SOCIAL HISTORY  Social History     Tobacco Use    Smoking status: Former     Current packs/day: 0.00     Types: Cigarettes     Quit date: 10/12/2022     Years since quitting:  "1.2    Smokeless tobacco: Never   Vaping Use    Vaping Use: Never used   Substance and Sexual Activity    Alcohol use: Not Currently    Drug use: Never    Sexual activity: Not Currently       CURRENT MEDICATIONS  Home Medications       Reviewed by Eh Zamora R.N. (Registered Nurse) on 01/02/24 at 2036  Med List Status: Not Addressed     Medication Last Dose Status   acetaminophen (TYLENOL) 500 MG Tab  Active   ADVAIR DISKUS 100-50 MCG/ACT AEROSOL POWDER, BREATH ACTIVATED  Active   albuterol 108 (90 Base) MCG/ACT Aero Soln inhalation aerosol  Active   aripiprazole (ABILIFY) 30 MG tablet  Active   aspirin 81 MG EC tablet  Active   atorvastatin (LIPITOR) 20 MG Tab  Active   benzonatate (TESSALON) 100 MG Cap  Active   fluticasone (FLONASE) 50 MCG/ACT nasal spray  Active   furosemide (LASIX) 20 MG Tab  Active   ibuprofen (MOTRIN) 600 MG Tab  Active   Lidocaine 4 % Patch  Active   methocarbamol (ROBAXIN) 500 MG Tab  Active   montelukast (SINGULAIR) 10 MG Tab  Active                    ALLERGIES  Allergies   Allergen Reactions    Penicillin G Rash and Itching     pt reports that she gets a rash all over her body and gets itchy    Amoxicillin Rash and Itching     Tolerates cephalosporins, pt reports that she gets a rash all over her body and gets itchy       PHYSICAL EXAM  VITAL SIGNS: BP (!) 149/79   Pulse 72   Temp 36.4 °C (97.5 °F) (Temporal)   Resp 16   Ht 1.549 m (5' 1\")   Wt 122 kg (268 lb)   LMP  (LMP Unknown)   SpO2 97%   BMI 50.64 kg/m²    Constitutional: No acute distress  HEENT: Atraumatic, normocephalic, pupils are equal round reactive to light, nose normal, mouth shows moist mucous membranes  Neck: Supple, no JVD, no tracheal deviation  Cardiovascular: Regular rate and rhythm, no murmur, rub or gallop, 2+ pulses peripherally x4  Thorax & Lungs: No respiratory distress, scant wheezing throughout, good aeration  GI: Soft, non-distended, bilateral perispinal muscular tenderness of the mid to lower " back, no CVA tenderness  Skin: Warm, dry, no acute rash or lesion  Musculoskeletal: Moving all extremities, no acute deformity, no edema, no tenderness  Neurologic: A&Ox3, at baseline mentation, cranial nerves II through XII are grossly intact, no sensory deficit, no ataxia  Psychiatric: Appropriate affect for situation at this time      DIAGNOSTIC STUDIES / PROCEDURES  EKG  I have independently interpreted this EKG  Results for orders placed or performed during the hospital encounter of 24   EKG (Now)   Result Value Ref Range    Report       [facility]    Test Date:  2024  Pt Name:    ADELINA MILLAN                Department: ER  MRN:        [id number]                      Room:       [facility]  Gender:     Female                       Technician: [id number]  :        [id number]                   Requested By:MALORIE GARCIA  Order #:    [id number]                    Reading MD: Malorie Garcia    Measurements  Intervals                                Axis  Rate:       67                           P:          0  FL:         0                            QRS:        45  QRSD:       116                          T:          8  QT:         408  QTc:        431    Interpretive Statements  Wandering baseline limits interpretation however appears to demonstrate a  sinus rhythm at a rate of 67 with PACs, normal axis, normal intervals, no ST  elevation or depression, no new T wave inversion.  No significant change from  prior EKG.  Electronically Signed On 2024 02:44:45 PST by Malorie Garcia           LABS  Labs Reviewed   CBC WITH DIFFERENTIAL - Abnormal; Notable for the following components:       Result Value    WBC 16.7 (*)     MCH 26.6 (*)     MCHC 31.5 (*)     Neutrophils-Polys 76.40 (*)     Lymphocytes 16.50 (*)     Neutrophils (Absolute) 12.80 (*)     Monos (Absolute) 0.95 (*)     All other components within normal limits   COMP METABOLIC PANEL - Abnormal; Notable for the  "following components:    Potassium 3.5 (*)     AST(SGOT) 10 (*)     Alkaline Phosphatase 121 (*)     All other components within normal limits   LIPASE   URINALYSIS   ESTIMATED GFR   LACTIC ACID   BLOOD CULTURE    Narrative:     Per Hospital Policy: Only change Specimen Src: to \"Line\" if  specified by physician order.   BLOOD CULTURE    Narrative:     Per Hospital Policy: Only change Specimen Src: to \"Line\" if  specified by physician order.         RADIOLOGY  I have independently interpreted the diagnostic imaging associated with this visit and am waiting the final reading from the radiologist.   My preliminary interpretation is as follows: Chest x-ray with concern for developing left pneumonia  Radiologist interpretation:   DX-CHEST-PORTABLE (1 VIEW)   Final Result      Mild left basilar opacity probably atelectasis with trace left pleural effusion not excluded.            COURSE & MEDICAL DECISION MAKING    ED Observation Status? No; Patient does not meet criteria for ED Observation.     INITIAL ASSESSMENT, COURSE AND PLAN  Care Narrative:     Patient presents to the emergency department for evaluation of bilateral flank pain, diarrhea, malaise and lightheadedness.  At the time of my evaluation has stable vitals and is quite well-appearing.  Does have reproducible muscular tenderness with palpation of the thoracic and lumbar musculature without CVA tenderness.  Labs obtained in triage remarkable for leukocytosis with a left shift which could be indicative of worsening infection versus D marginalization from steroid use started 2 days ago.  Urinalysis without evidence of infection and she is not reporting urinary symptoms suggestive of pyelonephritis.  Do suspect viral etiology of symptoms and possible gastroenteritis.  CMP and lipase pending to further evaluate for electrode abnormality, kidney injury, pancreatitis, hepatic dysfunction.  Will repeat chest x-ray to screen for worsening pneumonia.  Will treat with " Tylenol.    Lactate is normal.  CMP with mild hyponatremia otherwise largely unremarkable.  Chest x-ray appears to demonstrate a developing left lower lobe pneumonia.  In the setting of patient's recent presentation with respiratory symptoms that are no better today we will plan for empiric treatment with doxycycline.  First dose provided in the emergency department and a prescription written.  Otherwise patient was observed both while awake and asleep with no desaturations and has a low curb 65 score.  Feel she is a good candidate for outpatient follow-up.  Return precautions discussed all questions answered and she was discharged in stable condition with a plan for follow-up with her PCP in 2 to 3 days.    HYDRATION: Based on the patient's presentation of Dehydration the patient was given IV fluids. IV Hydration was used because oral hydration was not adequate alone. Upon recheck following hydration, the patient was improved.      ADDITIONAL PROBLEM LIST  Community-acquired pneumonia  DISPOSITION AND DISCUSSIONS  I have discussed management of the patient with the following physicians and JAY's: None    Discussion of management with other QHP or appropriate source(s): None     Escalation of care considered, and ultimately not performed:acute inpatient care management, however at this time, the patient is most appropriate for outpatient management    Decision tools and prescription drugs considered including, but not limited to: Antibiotics doxycycline for treatment of community-acquired pneumonia .    FINAL IMPRESSION  1. Community acquired pneumonia, unspecified laterality        PRESCRIPTIONS  Discharge Medication List as of 1/3/2024  1:09 AM        START taking these medications    Details   doxycycline (MONODOX) 100 MG capsule Take 1 Capsule by mouth 2 times a day for 5 days., Disp-10 Capsule, R-0, Normal             FOLLOW UP  [facility], Emergency Dept  [address] [facility] [address]  [address]  [phone]    As needed, If symptoms worsen    To establish a primary care provider within our system, please call [phone]    Schedule an appointment as soon as possible for a visit           -DISCHARGE-    Electronically signed by: Hiram Garcia M.D., 1/2/2024 9:58 PM

## 2024-01-03 NOTE — ED NOTES
Rounded with patient in waiting room. Vital signs rechecked. All questions answered. Apologized for wait time.

## 2024-01-04 NOTE — DISCHARGE INSTRUCTIONS
Continue all your normal medications.  Resume Eliquis taking your next dose this evening at bedtime.  Follow-up with your cardiologist.  Follow-up with the anticoagulation clinic at the [facility] within the next 1 to 2 weeks.  Return to the ER for bleeding problem or severe unexplained headache.  Use albuterol for cough and shortness of breath as prescribed.

## 2024-01-04 NOTE — TELEPHONE ENCOUNTER
SHANNON to JOSE, upon chart review, report uploaded this morning at 0932 in media tab.      Called Lorene, [phone], and she states this morning she was weak and tired and presented to the ED and was d/c on blood thinner.  Advised pt to keep FV with EP clinic 1/30/24.  SHe verbalizes understanding and states no other concerns or questions at this time.  Pt is appreciative of information given.    .  =====================    Patient Call  Abdirashid Dumont Ass't  You8 hours ago (9:21 AM)     Having issues uploading report, will update when completed.

## 2024-01-04 NOTE — ED NOTES
Pt educated on discharge instructions. All questions & concerns addressed. Vitals re-assessed and at pt's baseline. Pt ambulates to exit with steady gait and all belongings.    IV d/c'd    Pt reports she will call MTM in the lobby.

## 2024-01-11 ENCOUNTER — TELEPHONE (OUTPATIENT)
Dept: HEALTH INFORMATION MANAGEMENT | Facility: OTHER | Age: 49
End: 2024-01-11
Payer: MEDICAID

## 2024-01-17 ENCOUNTER — ANTICOAGULATION VISIT (OUTPATIENT)
Dept: VASCULAR LAB | Facility: MEDICAL CENTER | Age: 49
End: 2024-01-17
Attending: INTERNAL MEDICINE
Payer: MEDICAID

## 2024-01-17 DIAGNOSIS — I48.0 PAROXYSMAL ATRIAL FIBRILLATION (HCC): ICD-10-CM

## 2024-01-17 PROBLEM — I48.91 ATRIAL FIBRILLATION (HCC): Status: ACTIVE | Noted: 2024-01-17

## 2024-01-17 PROCEDURE — 99212 OFFICE O/P EST SF 10 MIN: CPT

## 2024-01-17 NOTE — PROGRESS NOTES
Target end date: Indefinite  Indication: AF  Drug: Eliquis 5 mg bid  CHADsVASC = 2  HAS-BLED = 0    Health Status Since Last Assessment  Pt denies any embolic events (stroke/tia/systemic embolism)    Pt is not new to Eliquis nor our clinic  She was restarted on Eliquis a couple weeks ago after she was found to be in AF    Adherence with DOAC Therapy  Pt has not missed doses in the average week.    Bleeding Risk Assessment  Pt reports epistaxis - 1 episode that lasted 5 seconds  Pt denies any hematuria  Pt denies any excessive or unusual bleeding/hematomas.  Pt denies any GI bleeds or hematemesis.  Pt denies any concerning daily headache or subdural hematoma symptoms.    Latest Hemoglobin: WNL  Hemoglobin   Date Value Ref Range Status   01/02/2024 13.9 12.0 - 16.0 g/dL Final     Latest Platelets: WNL  Platelet Count   Date Value Ref Range Status   01/02/2024 280 164 - 446 K/uL Final          Creatinine Clearance/Renal Function  Latest CrCl: 100 ml/min  Eliquis for AF: Scr < 1.5, age < 80, wt > 60kg - renal dose adjustment NOT indicated    Hepatic function  Latest LFTs: WNL  Pt denies any history of liver dysfunction   Pt denies  ETOH overuse     Drug Interactions  ASA/other antiplatelets: None  NSAID: Instructed pt to STOP ibuprofen  Other drug interactions: None  Verified no anticonvulsant or azole therapy, education provided for future use.     Examination  Blood Pressure   There were no vitals filed for this visit.  Symptomatic hypotension: Denies   Significant gait impairment/imbalance/high risk for falls? No    Final Assessment and Recommendations:  Patient appears stable from the anticoagulation standpoint.    Benefits of continued DOAC therapy outweigh risks for this patient  Recommend pt continue with current DOAC therapy: Eliquis 5 mg bid  DOAC is affordable     Other Actions: CMP/CBC hemogram ordered prior to next visit    Follow up:  Will follow up with patient 6 month(s).     Kinjal Blake, PharmD

## 2024-01-18 RX ORDER — METOPROLOL SUCCINATE 25 MG/1
25 TABLET, EXTENDED RELEASE ORAL DAILY
Qty: 90 TABLET | Refills: 0 | OUTPATIENT
Start: 2024-01-18

## 2024-01-18 NOTE — TELEPHONE ENCOUNTER
Is the patient due for a refill? Yes    Was the patient seen the past year? Yes    Date of last office visit: 11/29/2023    Does the patient have an upcoming appointment?  Yes   If yes, When? 01/30/2024    Provider to refill:JOSE    Does the patients insurance require a 100 day supply?  No

## 2024-01-24 ENCOUNTER — HOSPITAL ENCOUNTER (EMERGENCY)
Facility: MEDICAL CENTER | Age: 49
End: 2024-01-24
Attending: EMERGENCY MEDICINE
Payer: MEDICAID

## 2024-01-24 ENCOUNTER — APPOINTMENT (OUTPATIENT)
Dept: RADIOLOGY | Facility: MEDICAL CENTER | Age: 49
End: 2024-01-24
Attending: EMERGENCY MEDICINE
Payer: MEDICAID

## 2024-01-24 VITALS
SYSTOLIC BLOOD PRESSURE: 124 MMHG | TEMPERATURE: 98.4 F | DIASTOLIC BLOOD PRESSURE: 62 MMHG | OXYGEN SATURATION: 95 % | HEART RATE: 66 BPM | RESPIRATION RATE: 20 BRPM | WEIGHT: 268 LBS | BODY MASS INDEX: 50.64 KG/M2

## 2024-01-24 DIAGNOSIS — J45.901 REACTIVE AIRWAY DISEASE WITH ACUTE EXACERBATION, UNSPECIFIED ASTHMA SEVERITY, UNSPECIFIED WHETHER PERSISTENT: ICD-10-CM

## 2024-01-24 DIAGNOSIS — M54.9 PAIN, UPPER BACK: ICD-10-CM

## 2024-01-24 DIAGNOSIS — J44.1 ACUTE EXACERBATION OF CHRONIC OBSTRUCTIVE PULMONARY DISEASE (COPD) (HCC): ICD-10-CM

## 2024-01-24 LAB
ALBUMIN SERPL BCP-MCNC: 4 G/DL (ref 3.2–4.9)
ALBUMIN/GLOB SERPL: 1.3 G/DL
ALP SERPL-CCNC: 132 U/L (ref 30–99)
ALT SERPL-CCNC: 10 U/L (ref 2–50)
ANION GAP SERPL CALC-SCNC: 14 MMOL/L (ref 7–16)
AST SERPL-CCNC: 14 U/L (ref 12–45)
BASOPHILS # BLD AUTO: 0.2 % (ref 0–1.8)
BASOPHILS # BLD: 0.02 K/UL (ref 0–0.12)
BILIRUB SERPL-MCNC: 0.5 MG/DL (ref 0.1–1.5)
BUN SERPL-MCNC: 10 MG/DL (ref 8–22)
CALCIUM ALBUM COR SERPL-MCNC: 9.2 MG/DL (ref 8.5–10.5)
CALCIUM SERPL-MCNC: 9.2 MG/DL (ref 8.5–10.5)
CHLORIDE SERPL-SCNC: 107 MMOL/L (ref 96–112)
CO2 SERPL-SCNC: 24 MMOL/L (ref 20–33)
CREAT SERPL-MCNC: 0.78 MG/DL (ref 0.5–1.4)
EKG IMPRESSION: NORMAL
EOSINOPHIL # BLD AUTO: 0.09 K/UL (ref 0–0.51)
EOSINOPHIL NFR BLD: 0.7 % (ref 0–6.9)
ERYTHROCYTE [DISTWIDTH] IN BLOOD BY AUTOMATED COUNT: 50.3 FL (ref 35.9–50)
FLUAV RNA SPEC QL NAA+PROBE: NEGATIVE
FLUBV RNA SPEC QL NAA+PROBE: NEGATIVE
GFR SERPLBLD CREATININE-BSD FMLA CKD-EPI: 92 ML/MIN/1.73 M 2
GLOBULIN SER CALC-MCNC: 3.2 G/DL (ref 1.9–3.5)
GLUCOSE SERPL-MCNC: 102 MG/DL (ref 65–99)
HCT VFR BLD AUTO: 43.2 % (ref 37–47)
HGB BLD-MCNC: 13.2 G/DL (ref 12–16)
IMM GRANULOCYTES # BLD AUTO: 0.05 K/UL (ref 0–0.11)
IMM GRANULOCYTES NFR BLD AUTO: 0.4 % (ref 0–0.9)
LIPASE SERPL-CCNC: 24 U/L (ref 11–82)
LYMPHOCYTES # BLD AUTO: 2.35 K/UL (ref 1–4.8)
LYMPHOCYTES NFR BLD: 19.3 % (ref 22–41)
MCH RBC QN AUTO: 26.5 PG (ref 27–33)
MCHC RBC AUTO-ENTMCNC: 30.6 G/DL (ref 32.2–35.5)
MCV RBC AUTO: 86.7 FL (ref 81.4–97.8)
MONOCYTES # BLD AUTO: 0.7 K/UL (ref 0–0.85)
MONOCYTES NFR BLD AUTO: 5.8 % (ref 0–13.4)
NEUTROPHILS # BLD AUTO: 8.94 K/UL (ref 1.82–7.42)
NEUTROPHILS NFR BLD: 73.6 % (ref 44–72)
NRBC # BLD AUTO: 0 K/UL
NRBC BLD-RTO: 0 /100 WBC (ref 0–0.2)
NT-PROBNP SERPL IA-MCNC: 491 PG/ML (ref 0–125)
PLATELET # BLD AUTO: 285 K/UL (ref 164–446)
PMV BLD AUTO: 11 FL (ref 9–12.9)
POTASSIUM SERPL-SCNC: 3.9 MMOL/L (ref 3.6–5.5)
PROT SERPL-MCNC: 7.2 G/DL (ref 6–8.2)
RBC # BLD AUTO: 4.98 M/UL (ref 4.2–5.4)
RSV RNA SPEC QL NAA+PROBE: NEGATIVE
SARS-COV-2 RNA RESP QL NAA+PROBE: NOTDETECTED
SODIUM SERPL-SCNC: 145 MMOL/L (ref 135–145)
TROPONIN T SERPL-MCNC: 28 NG/L (ref 6–19)
WBC # BLD AUTO: 12.2 K/UL (ref 4.8–10.8)

## 2024-01-24 PROCEDURE — 93005 ELECTROCARDIOGRAM TRACING: CPT

## 2024-01-24 PROCEDURE — 700105 HCHG RX REV CODE 258: Mod: UD | Performed by: EMERGENCY MEDICINE

## 2024-01-24 PROCEDURE — 93005 ELECTROCARDIOGRAM TRACING: CPT | Performed by: EMERGENCY MEDICINE

## 2024-01-24 PROCEDURE — 83880 ASSAY OF NATRIURETIC PEPTIDE: CPT

## 2024-01-24 PROCEDURE — 700111 HCHG RX REV CODE 636 W/ 250 OVERRIDE (IP): Mod: UD | Performed by: EMERGENCY MEDICINE

## 2024-01-24 PROCEDURE — 36415 COLL VENOUS BLD VENIPUNCTURE: CPT

## 2024-01-24 PROCEDURE — 84484 ASSAY OF TROPONIN QUANT: CPT

## 2024-01-24 PROCEDURE — 96374 THER/PROPH/DIAG INJ IV PUSH: CPT

## 2024-01-24 PROCEDURE — A9270 NON-COVERED ITEM OR SERVICE: HCPCS | Mod: UD | Performed by: EMERGENCY MEDICINE

## 2024-01-24 PROCEDURE — 99285 EMERGENCY DEPT VISIT HI MDM: CPT

## 2024-01-24 PROCEDURE — 0241U HCHG SARS-COV-2 COVID-19 NFCT DS RESP RNA 4 TRGT ED POC: CPT

## 2024-01-24 PROCEDURE — 71045 X-RAY EXAM CHEST 1 VIEW: CPT

## 2024-01-24 PROCEDURE — 85025 COMPLETE CBC W/AUTO DIFF WBC: CPT

## 2024-01-24 PROCEDURE — 700102 HCHG RX REV CODE 250 W/ 637 OVERRIDE(OP): Mod: UD | Performed by: EMERGENCY MEDICINE

## 2024-01-24 PROCEDURE — 80053 COMPREHEN METABOLIC PANEL: CPT

## 2024-01-24 PROCEDURE — 83690 ASSAY OF LIPASE: CPT

## 2024-01-24 RX ORDER — KETOROLAC TROMETHAMINE 15 MG/ML
15 INJECTION, SOLUTION INTRAMUSCULAR; INTRAVENOUS ONCE
Status: COMPLETED | OUTPATIENT
Start: 2024-01-24 | End: 2024-01-24

## 2024-01-24 RX ORDER — AZITHROMYCIN 250 MG/1
500 TABLET, FILM COATED ORAL ONCE
Status: COMPLETED | OUTPATIENT
Start: 2024-01-24 | End: 2024-01-24

## 2024-01-24 RX ORDER — PREDNISONE 20 MG/1
40 TABLET ORAL DAILY
Qty: 6 TABLET | Refills: 0 | Status: SHIPPED | OUTPATIENT
Start: 2024-01-24 | End: 2024-01-27

## 2024-01-24 RX ORDER — SODIUM CHLORIDE 9 MG/ML
500 INJECTION, SOLUTION INTRAVENOUS ONCE
Status: COMPLETED | OUTPATIENT
Start: 2024-01-24 | End: 2024-01-24

## 2024-01-24 RX ORDER — AZITHROMYCIN 250 MG/1
250 TABLET, FILM COATED ORAL DAILY
Qty: 4 TABLET | Refills: 0 | Status: ACTIVE | OUTPATIENT
Start: 2024-01-24 | End: 2024-01-28

## 2024-01-24 RX ORDER — PREDNISONE 20 MG/1
60 TABLET ORAL ONCE
Status: COMPLETED | OUTPATIENT
Start: 2024-01-24 | End: 2024-01-24

## 2024-01-24 RX ADMIN — PREDNISONE 60 MG: 20 TABLET ORAL at 22:53

## 2024-01-24 RX ADMIN — AZITHROMYCIN DIHYDRATE 500 MG: 250 TABLET, FILM COATED ORAL at 22:53

## 2024-01-24 RX ADMIN — SODIUM CHLORIDE 500 ML: 9 INJECTION, SOLUTION INTRAVENOUS at 21:12

## 2024-01-24 RX ADMIN — KETOROLAC TROMETHAMINE 15 MG: 15 INJECTION, SOLUTION INTRAMUSCULAR; INTRAVENOUS at 21:12

## 2024-01-24 ASSESSMENT — FIBROSIS 4 INDEX: FIB4 SCORE: 0.46

## 2024-01-25 NOTE — ED PROVIDER NOTES
"  CHIEF COMPLAINT  Chief Complaint   Patient presents with    Back Pain     Upper back pain x 3 weeks    Cough     X1 week       LIMITATION TO HISTORY   None    HPI    Lorene Haro is a 48 y.o. female here to \"make sure I am a okay'.    Patient states she has had been feeling unwell.  Duration 1 week.  Just general malaise.  Associate with slight cough for 1 day.  And weakness.  Weakness is described as feeling unsteady in her legs.  This is chronic and she uses a walker for several years but occasionally not been using her walker and feeling weak as well.    She will complains of upper back pain between her scapula.  Has been going on for 3 weeks worse when she moves better when she stays still no associated associated fevers or chills    She states that she was seen with her sister usually she has help at her home but when she lives her sister she will put house to help her sister this is caused her to be tired and rundown.  She then goes home and she does have help at home but with a walker and her symptoms patient states that she usually gets worse.    She denies dysuria urgency or frequency.    OUTSIDE HISTORIAN(S):  None at this time    EXTERNAL RECORDS REVIEWED  Is seen in the ER history history of COPD.  Using inhalers history also recently have been admitted for A-fib and having pneumonia    REVIEW OF SYSTEMS  None    PAST MEDICAL HISTORY  Past Medical History:   Diagnosis Date    A-fib ([facility])     MONI (acute kidney injury) ([facility]) 8/9/2023    Asthma     Bipolar 2 disorder ([facility])     Chickenpox     Chronic obstructive pulmonary disease ([facility])     Hypertension     On home oxygen therapy     Other psychological stress     Schizophrenic disorder ([facility])     Yeast infection of the skin 04/01/2021       FAMILY HISTORY  Family History   Problem Relation Age of Onset    No Known Problems Mother     Cancer Sister        SOCIAL HISTORY  Social History     Tobacco Use    Smoking status: Former     Current packs/day: " 0.00     Types: Cigarettes     Quit date: 10/12/2022     Years since quittin.2    Smokeless tobacco: Never   Vaping Use    Vaping Use: Never used   Substance Use Topics    Alcohol use: Not Currently    Drug use: Never     Social History     Substance and Sexual Activity   Drug Use Never       SURGICAL HISTORY  Past Surgical History:   Procedure Laterality Date    CHOLECYSTECTOMY      COLECTOMY      HYSTERECTOMY LAPAROSCOPY      KNEE ARTHROSCOPY Left        CURRENT MEDICATIONS  No current facility-administered medications for this encounter.    Current Outpatient Medications:     apixaban (ELIQUIS) 5mg Tab, Take 1 Tablet by mouth 2 times a day., Disp: 60 Tablet, Rfl: 5    benzonatate (TESSALON) 100 MG Cap, Take 1 Capsule by mouth 3 times a day as needed for Cough., Disp: 60 Capsule, Rfl: 0    Lidocaine 4 % Patch, Apply 1 Each topically every 12 hours., Disp: 1 Patch, Rfl: 0    methocarbamol (ROBAXIN) 500 MG Tab, Take 1 Tablet by mouth 3 times a day., Disp: 21 Tablet, Rfl: 0    acetaminophen (TYLENOL) 500 MG Tab, Take 1-2 Tablets by mouth every 6 hours as needed for Moderate Pain., Disp: 30 Tablet, Rfl: 0    albuterol 108 (90 Base) MCG/ACT Aero Soln inhalation aerosol, Inhale 2 Puffs every 6 hours as needed for Shortness of Breath., Disp: 8.5 g, Rfl: 0    fluticasone (FLONASE) 50 MCG/ACT nasal spray, Administer 2 Sprays into each nostril every morning., Disp: , Rfl:     ADVAIR DISKUS 100-50 MCG/ACT AEROSOL POWDER, BREATH ACTIVATED, Inhale 1 Puff 2 times a day., Disp: , Rfl:     furosemide (LASIX) 20 MG Tab, Take 1 Tablet by mouth every day., Disp: 90 Tablet, Rfl: 0    aripiprazole (ABILIFY) 30 MG tablet, Take 1 Tablet by mouth every morning. Indications: Major Depressive Disorder, Disp: 30 Tablet, Rfl: 0    atorvastatin (LIPITOR) 20 MG Tab, Take 1 Tablet by mouth at bedtime., Disp: 30 Tablet, Rfl: 0    montelukast (SINGULAIR) 10 MG Tab, Take 1 Tablet by mouth every evening., Disp: 30 Tablet, Rfl:  0    ALLERGIES  Allergies   Allergen Reactions    Penicillin G Rash and Itching     pt reports that she gets a rash all over her body and gets itchy    Amoxicillin Rash and Itching     Tolerates cephalosporins, pt reports that she gets a rash all over her body and gets itchy       PHYSICAL EXAM  VITAL SIGNS: /78   Pulse 68   Temp 36.9 °C (98.5 °F) (Temporal)   Resp 15   Wt 122 kg (268 lb)   LMP  (LMP Unknown)   SpO2 98%   BMI 50.64 kg/m²   Reviewed and noted.  Constitutional: Well developed, Well nourished, uncomfortable appearing   HENT: Normocephalic, atraumatic, bilateral external ears normal, No intraoral erythema, edema, exudate  Eyes: PERRLA, conjunctiva pink, no scleral icterus.   Cardiovascular: Regular rate and rhythm. No murmurs, rubs or gallops.  No dependent edema or calf tenderness  Respiratory: decreased breath sounds throughout no rhonchi..  Abdominal:  Abdomen soft, non-tender, non distended. No rebound, or guarding.    Skin: No erythema, no rash. No wounds or bruising.  Genitourinary: No costovertebral angle tenderness.   Musculoskeletal: no deformities.   Neurologic: Alert, no facial droop noted. All extra ocular muscles intact. Moves all extremities with out weakness noted  Psychiatric: Affect normal, Judgment normal, Mood normal.         MEDICAL DECISION MAKING:  PROBLEMS EVALUATED THIS VISIT:  Cough.  Patient is history of COPD exacerbation, not hypoxic here no hypoxia no tachycardia no sepsis triggers.  Back mid back pain reproducible likely musculoskeletal no tearing sensation no elevated blood pressure no history of aneurysm suggest life-threatening issue at this time         PLAN:  X-ray lab work EKG pain steroids antibiotics    RISK:  Patient is at a moderate risk and therefore requirement antibiotic prescriptions as well as steroid prescriptions for exacerbation.        RESULTS    LABS Ordered and Reviewed by Me:  Results for orders placed or performed during the hospital  encounter of 01/24/24   CBC w/ Differential   Result Value Ref Range    WBC 12.2 (H) 4.8 - 10.8 K/uL    RBC 4.98 4.20 - 5.40 M/uL    Hemoglobin 13.2 12.0 - 16.0 g/dL    Hematocrit 43.2 37.0 - 47.0 %    MCV 86.7 81.4 - 97.8 fL    MCH 26.5 (L) 27.0 - 33.0 pg    MCHC 30.6 (L) 32.2 - 35.5 g/dL    RDW 50.3 (H) 35.9 - 50.0 fL    Platelet Count 285 164 - 446 K/uL    MPV 11.0 9.0 - 12.9 fL    Neutrophils-Polys 73.60 (H) 44.00 - 72.00 %    Lymphocytes 19.30 (L) 22.00 - 41.00 %    Monocytes 5.80 0.00 - 13.40 %    Eosinophils 0.70 0.00 - 6.90 %    Basophils 0.20 0.00 - 1.80 %    Immature Granulocytes 0.40 0.00 - 0.90 %    Nucleated RBC 0.00 0.00 - 0.20 /100 WBC    Neutrophils (Absolute) 8.94 (H) 1.82 - 7.42 K/uL    Lymphs (Absolute) 2.35 1.00 - 4.80 K/uL    Monos (Absolute) 0.70 0.00 - 0.85 K/uL    Eos (Absolute) 0.09 0.00 - 0.51 K/uL    Baso (Absolute) 0.02 0.00 - 0.12 K/uL    Immature Granulocytes (abs) 0.05 0.00 - 0.11 K/uL    NRBC (Absolute) 0.00 K/uL   Complete Metabolic Panel (CMP)   Result Value Ref Range    Sodium 145 135 - 145 mmol/L    Potassium 3.9 3.6 - 5.5 mmol/L    Chloride 107 96 - 112 mmol/L    Co2 24 20 - 33 mmol/L    Anion Gap 14.0 7.0 - 16.0    Glucose 102 (H) 65 - 99 mg/dL    Bun 10 8 - 22 mg/dL    Creatinine 0.78 0.50 - 1.40 mg/dL    Calcium 9.2 8.5 - 10.5 mg/dL    Correct Calcium 9.2 8.5 - 10.5 mg/dL    AST(SGOT) 14 12 - 45 U/L    ALT(SGPT) 10 2 - 50 U/L    Alkaline Phosphatase 132 (H) 30 - 99 U/L    Total Bilirubin 0.5 0.1 - 1.5 mg/dL    Albumin 4.0 3.2 - 4.9 g/dL    Total Protein 7.2 6.0 - 8.2 g/dL    Globulin 3.2 1.9 - 3.5 g/dL    A-G Ratio 1.3 g/dL   Troponin - STAT Once   Result Value Ref Range    Troponin T 28 (H) 6 - 19 ng/L   proBrain Natriuretic Peptide, NT   Result Value Ref Range    NT-proBNP 491 (H) 0 - 125 pg/mL   Lipase   Result Value Ref Range    Lipase 24 11 - 82 U/L   ESTIMATED GFR   Result Value Ref Range    GFR (CKD-EPI) 92 >60 mL/min/1.73 m 2   EKG   Result Value Ref Range    Report        [facility] Emergency Dept.    Test Date:  2024  Pt Name:    ADELINA MILLAN                Department: ER  MRN:        [id number]                      Room:  Gender:     Female                       Technician: [id number]  :        [id number]                   Requested By:ER TRIAGE PROTOCOL  Order #:    [id number]                    Reading MD: Yeison LEONG MD    Measurements  Intervals                                Axis  Rate:       62                           P:          78  NC:         140                          QRS:        41  QRSD:       113                          T:          3  QT:         400  QTc:        407    Interpretive Statements  Sinus rhythm  Rate is not 62  Intervals normal NC normal QRS normal QTc  Axis normal  RSR in 1 is noted which is unchanged from January 3, 2024  Ischemia no ST segment elevations or depressions noted that are new from  previous EKG compared.  Impression abnormal no acute ischemic findings  Electronically Sign ed On 2024 20:35:49 PST by Yeison LEONG MD     POC CoV-2, FLU A/B, RSV by PCR   Result Value Ref Range    POC Influenza A RNA, PCR Negative Negative    POC Influenza B RNA, PCR Negative Negative    POC RSV, by PCR Negative Negative    POC SARS-CoV-2, PCR NotDetected      *Note: Due to a large number of results and/or encounters for the requested time period, some results have not been displayed. A complete set of results can be found in Results Review.         RADIOLOGY    I have independently interpreted the diagnostic imaging associated with this visit and am waiting the final reading from the radiologist.   My preliminary interpretation is a follows: I reviewed the x-ray and feel there is a left lower lobe infiltrate.    Radiologist interpretation:   DX-CHEST-PORTABLE (1 VIEW)   Final Result         1.  No acute cardiopulmonary disease.            ED COURSE:    ED Observation Status? No   No noted need for  observation for developing issue    INTERVENTIONS BY ME:  Medications   azithromycin (Zithromax) tablet 500 mg (has no administration in time range)   predniSONE (Deltasone) tablet 60 mg (has no administration in time range)   NS infusion 500 mL (0 mL Intravenous Stopped 1/24/24 2205)   ketorolac (Toradol) 15 MG/ML injection 15 mg (15 mg Intravenous Given 1/24/24 2112)       Response on recheck:  Patient is feeling better..    CONSULTANTS/OTHER GROUPS CONTACTED    None at this time    FINAL DISPO PLAN   New Prescriptions    AZITHROMYCIN (ZITHROMAX) 250 MG TAB    Take 1 Tablet by mouth every day for 4 days.    PREDNISONE (DELTASONE) 20 MG TAB    Take 2 Tablets by mouth every day for 3 days.         Followup:  [facility], Emergency Dept  [address]  [place] [address] [phone]  Go to   If symptoms worsen      This is a 48-year-old female history of COPD exacerbation with cough yellow sputum and some bilateral mid back pain between the scapula is reproducible.  She is not hypoxic she is noninfectious and elevated white cell count x-ray read by me shows possible infiltrate in left lower lobe but read by the radiologist as negative.  The patient be safely discharged home at this time until her return if she is feeling worse in any time.  Will give her Zithromax and prednisone for exam COPD exacerbation and questionable pneumonia    CONDITION: Improved.     FINAL IMPRESSION  1. Acute exacerbation of chronic obstructive pulmonary disease (COPD) ([facility])    2. Pain, upper back    3. Reactive airway disease with acute exacerbation, unspecified asthma severity, unspecified whether persistent

## 2024-01-25 NOTE — ED TRIAGE NOTES
.  Chief Complaint   Patient presents with    Back Pain     Upper back pain x 3 weeks    Cough     X1 week       Pt is a poor historian has multiple complaints all somewhat chronic and vague. Also concerned home concentrator is not working however at triage o2 98 % ra. Covid poc collected. EKG paged

## 2024-01-29 ENCOUNTER — NON-PROVIDER VISIT (OUTPATIENT)
Dept: CARDIOLOGY | Facility: MEDICAL CENTER | Age: 49
End: 2024-01-29
Payer: MEDICAID

## 2024-01-29 ENCOUNTER — TELEPHONE (OUTPATIENT)
Dept: VASCULAR LAB | Facility: MEDICAL CENTER | Age: 49
End: 2024-01-29
Payer: MEDICAID

## 2024-01-29 PROCEDURE — 93298 REM INTERROG DEV EVAL SCRMS: CPT | Performed by: INTERNAL MEDICINE

## 2024-01-29 NOTE — TELEPHONE ENCOUNTER
Received Refill PA request via [facility]  for ELIQUIS 5MG TABLETS. (Quantity:60, Day Supply:30)     Insurance: [place] [employer]  Member ID:  [id number]  BIN: [id number]  PCN: [id number]  [id number]     Ran Test claim via [place] & medication Pays for a $0/30DS copay. Will outreach to patient to offer specialty pharmacy services and or release to preferred pharmacy    CURTIS Aponte, PhT  Pharmacy Liaison (Rx Coordinator)  P: [phone]  1/29/2024 12:31 PM

## 2024-01-30 ENCOUNTER — APPOINTMENT (OUTPATIENT)
Dept: CARDIOLOGY | Facility: MEDICAL CENTER | Age: 49
End: 2024-01-30
Attending: INTERNAL MEDICINE
Payer: MEDICAID

## 2024-01-30 NOTE — CARDIAC REMOTE MONITOR - SCAN
Device transmission reviewed. Device demonstrated appropriate function.       Electronically Signed by: Nader Barraza M.D.    2/7/2024  4:29 PM

## 2024-01-31 ENCOUNTER — TELEPHONE (OUTPATIENT)
Dept: CARDIOLOGY | Facility: MEDICAL CENTER | Age: 49
End: 2024-01-31
Payer: MEDICAID

## 2024-01-31 NOTE — TELEPHONE ENCOUNTER
Patient had a scheduled appointment with Dr. Barraza on 01/30/24, but was canceled due to Dr. Barraza not being in clinic. Patient has been rescheduled for 02/20/24. Patient wanted to ask her doctor if they should still be on blood thinners?     Please advise, thank you.

## 2024-02-01 NOTE — TELEPHONE ENCOUNTER
Called pt, [phone], to continue blood thinner use until she is evaluated by the EP clinic for further advise.  Pt verbalizes understanding and states no other concerns or questions at this time.  Lorene is appreciative of information given.

## 2024-02-04 ENCOUNTER — HOSPITAL ENCOUNTER (EMERGENCY)
Facility: MEDICAL CENTER | Age: 49
End: 2024-02-04
Attending: EMERGENCY MEDICINE
Payer: MEDICAID

## 2024-02-04 VITALS
OXYGEN SATURATION: 93 % | RESPIRATION RATE: 16 BRPM | DIASTOLIC BLOOD PRESSURE: 57 MMHG | BODY MASS INDEX: 50.64 KG/M2 | SYSTOLIC BLOOD PRESSURE: 124 MMHG | TEMPERATURE: 97.5 F | WEIGHT: 268 LBS | HEART RATE: 69 BPM

## 2024-02-04 DIAGNOSIS — B37.2 YEAST DERMATITIS: Primary | ICD-10-CM

## 2024-02-04 PROCEDURE — 700111 HCHG RX REV CODE 636 W/ 250 OVERRIDE (IP): Mod: UD | Performed by: EMERGENCY MEDICINE

## 2024-02-04 PROCEDURE — 700102 HCHG RX REV CODE 250 W/ 637 OVERRIDE(OP): Mod: UD | Performed by: EMERGENCY MEDICINE

## 2024-02-04 PROCEDURE — A9270 NON-COVERED ITEM OR SERVICE: HCPCS | Mod: UD | Performed by: EMERGENCY MEDICINE

## 2024-02-04 PROCEDURE — 99284 EMERGENCY DEPT VISIT MOD MDM: CPT

## 2024-02-04 RX ORDER — FLUCONAZOLE 200 MG/1
400 TABLET ORAL ONCE
Status: COMPLETED | OUTPATIENT
Start: 2024-02-04 | End: 2024-02-04

## 2024-02-04 RX ORDER — NYSTATIN 100000 [USP'U]/G
1 POWDER TOPICAL 3 TIMES DAILY
Qty: 60 G | Refills: 0 | Status: SHIPPED | OUTPATIENT
Start: 2024-02-04 | End: 2024-03-25

## 2024-02-04 RX ORDER — ONDANSETRON 4 MG/1
4 TABLET, ORALLY DISINTEGRATING ORAL ONCE
Status: COMPLETED | OUTPATIENT
Start: 2024-02-04 | End: 2024-02-04

## 2024-02-04 RX ORDER — NYSTATIN 100000 [USP'U]/G
POWDER TOPICAL ONCE
Status: COMPLETED | OUTPATIENT
Start: 2024-02-04 | End: 2024-02-04

## 2024-02-04 RX ADMIN — NYSTATIN: 100000 POWDER TOPICAL at 04:34

## 2024-02-04 RX ADMIN — ONDANSETRON 4 MG: 4 TABLET, ORALLY DISINTEGRATING ORAL at 04:34

## 2024-02-04 RX ADMIN — FLUCONAZOLE 400 MG: 200 TABLET ORAL at 06:55

## 2024-02-04 ASSESSMENT — FIBROSIS 4 INDEX: FIB4 SCORE: 0.78

## 2024-02-04 ASSESSMENT — PAIN DESCRIPTION - PAIN TYPE: TYPE: ACUTE PAIN

## 2024-02-04 ASSESSMENT — LIFESTYLE VARIABLES: DO YOU DRINK ALCOHOL: NO

## 2024-02-04 NOTE — ED NOTES
Reviewed discharge instructions with pt. Verbalized understanding of instructions. Will follow up as directed. Ambulatory out of ER via WC for comfort.

## 2024-02-04 NOTE — ED PROVIDER NOTES
ER Provider Note    Scribed for Barry Campbell Ii, M.d. by Araseli Hernandez. 2/4/2024  4:19 AM    Primary Care Provider: Pcp Not In Computer    CHIEF COMPLAINT  Chief Complaint   Patient presents with    Rash     BIB [facility], reports rash to abd/thigh region x 2 days. Denies itching to site.      EXTERNAL RECORDS REVIEWED  none    HPI/ROS  LIMITATION TO HISTORY   Select: : None  OUTSIDE HISTORIAN(S):  none    Lorene Haro is a 48 y.o. female who presents to the ED via EMS for evaluation of a rash onset 2 days ago. She describes that the rash is concentrated to her lower abdomen and thigh region. The patient reports that the rash hurts, but denies any itchiness. The patient notes that she has been applying antifungal ointment on the rash with no alleviation. The patient also states that she is experiencing nausea.     PAST MEDICAL HISTORY  Past Medical History:   Diagnosis Date    A-fib (H[facility])     MONI (acute kidney injury) ([facility]) 8/9/2023    Asthma     Bipolar 2 disorder ([facility])     Chickenpox     Chronic obstructive pulmonary disease ([facility])     Hypertension     On home oxygen therapy     Other psychological stress     Schizophrenic disorder ([facility])     Yeast infection of the skin 04/01/2021       SURGICAL HISTORY  Past Surgical History:   Procedure Laterality Date    CHOLECYSTECTOMY      COLECTOMY      HYSTERECTOMY LAPAROSCOPY      KNEE ARTHROSCOPY Left        FAMILY HISTORY  Family History   Problem Relation Age of Onset    No Known Problems Mother     Cancer Sister        SOCIAL HISTORY   reports that she quit smoking about 15 months ago. Her smoking use included cigarettes. She has never used smokeless tobacco. She reports that she does not currently use alcohol. She reports that she does not use drugs.    CURRENT MEDICATIONS  Previous Medications    ACETAMINOPHEN (TYLENOL) 500 MG TAB    Take 1-2 Tablets by mouth every 6 hours as needed for Moderate Pain.    ADVAIR DISKUS 100-50 MCG/ACT AEROSOL  POWDER, BREATH ACTIVATED    Inhale 1 Puff 2 times a day.    ALBUTEROL 108 (90 BASE) MCG/ACT AERO SOLN INHALATION AEROSOL    Inhale 2 Puffs every 6 hours as needed for Shortness of Breath.    APIXABAN (ELIQUIS) 5MG TAB    Take 1 Tablet by mouth 2 times a day.    ARIPIPRAZOLE (ABILIFY) 30 MG TABLET    Take 1 Tablet by mouth every morning. Indications: Major Depressive Disorder    ATORVASTATIN (LIPITOR) 20 MG TAB    Take 1 Tablet by mouth at bedtime.    BENZONATATE (TESSALON) 100 MG CAP    Take 1 Capsule by mouth 3 times a day as needed for Cough.    FLUTICASONE (FLONASE) 50 MCG/ACT NASAL SPRAY    Administer 2 Sprays into each nostril every morning.    FUROSEMIDE (LASIX) 20 MG TAB    Take 1 Tablet by mouth every day.    LIDOCAINE 4 % PATCH    Apply 1 Each topically every 12 hours.    METHOCARBAMOL (ROBAXIN) 500 MG TAB    Take 1 Tablet by mouth 3 times a day.    MONTELUKAST (SINGULAIR) 10 MG TAB    Take 1 Tablet by mouth every evening.       ALLERGIES  Penicillin g and Amoxicillin    PHYSICAL EXAM  /63   Pulse 76   Temp 36.7 °C (98 °F) (Temporal)   Resp 18   Wt 122 kg (268 lb)   LMP  (LMP Unknown)   SpO2 93%   BMI 50.64 kg/m²   Physical Exam  Vitals and nursing note reviewed.   Constitutional:       Comments: 48 year old woman with increased BMI   Cardiovascular:      Rate and Rhythm: Normal rate.   Pulmonary:      Effort: Pulmonary effort is normal.   Abdominal:      General: There is no distension.      Tenderness: There is no abdominal tenderness.   Skin:     Comments: Bilateral abdominal panus extending to groin and upper thigh regions with blanching redness, whitish exudate, no skin sloughing or blistering, and satellite lesions are present   Psychiatric:         Mood and Affect: Mood normal.           DIAGNOSTIC STUDIES      COURSE & MEDICAL DECISION MAKING     MENT, COURSE AND PLAN  Care Narrative: This is an emergency department evaluation of a 48 year old woman with multiple medical problems who  presents with concerns of a rash on upper thighs and groin. On exam she has extensive yeast dermatitis, characteristic satellite lesions present. No blistering, skin sloughing to suggest a more severe underlying skin condition such as a necrotizing infection.  Give dose of fluconazole, nystatin powder applied. She had some nausea and was given zofran. Prescribed nystatin powder, asked her to follow up with primary provider.       PROBLEM LIST  #Yeast dermatitis    DISPOSITION AND DISCUSSIONS    FINAL DIANGOSIS  1. Yeast dermatitis Active      IAraseli (Scribe), am scribing for, and in the presence of, BRINDA Velazquez II.    Electronically signed by: Araseli Hernandez (Scribe), 2/4/2024    IBarry II, M* personally performed the services described in this documentation, as scribed by Araseli Hernandez in my presence, and it is both accurate and complete.      The note accurately reflects work and decisions made by me.  Barry Campebll II, M.D.  2/4/2024  5:26 AM

## 2024-02-04 NOTE — ED TRIAGE NOTES
Chief Complaint   Patient presents with    Rash     BIB [facility], reports rash to abd/thigh region x 2 days. Denies itching to site.      Pt reports pain at the location of rash 9/10 sharp.     47 y/o female wheelchair to triage for above complaint. aaox4    Pt place in waiting area. Educated on triage process. Pt will inform staff of any medical changes.    /61   Pulse 77   Temp 36.6 °C (97.9 °F) (Temporal)   Resp 19   Wt 122 kg (268 lb)   LMP  (LMP Unknown)   SpO2 93%   BMI 50.64 kg/m²

## 2024-02-06 ENCOUNTER — APPOINTMENT (OUTPATIENT)
Dept: RADIOLOGY | Facility: MEDICAL CENTER | Age: 49
End: 2024-02-06
Attending: EMERGENCY MEDICINE
Payer: MEDICAID

## 2024-02-06 ENCOUNTER — HOSPITAL ENCOUNTER (EMERGENCY)
Facility: MEDICAL CENTER | Age: 49
End: 2024-02-06
Attending: EMERGENCY MEDICINE
Payer: MEDICAID

## 2024-02-06 VITALS
RESPIRATION RATE: 15 BRPM | HEIGHT: 61 IN | DIASTOLIC BLOOD PRESSURE: 73 MMHG | OXYGEN SATURATION: 91 % | SYSTOLIC BLOOD PRESSURE: 136 MMHG | BODY MASS INDEX: 50.6 KG/M2 | TEMPERATURE: 97 F | WEIGHT: 268 LBS | HEART RATE: 77 BPM

## 2024-02-06 DIAGNOSIS — J06.9 UPPER RESPIRATORY TRACT INFECTION, UNSPECIFIED TYPE: ICD-10-CM

## 2024-02-06 DIAGNOSIS — B37.2 CUTANEOUS CANDIDIASIS: ICD-10-CM

## 2024-02-06 LAB
ALBUMIN SERPL BCP-MCNC: 4.1 G/DL (ref 3.2–4.9)
ALBUMIN/GLOB SERPL: 1.4 G/DL
ALP SERPL-CCNC: 138 U/L (ref 30–99)
ALT SERPL-CCNC: 12 U/L (ref 2–50)
ANION GAP SERPL CALC-SCNC: 11 MMOL/L (ref 7–16)
AST SERPL-CCNC: 6 U/L (ref 12–45)
BASOPHILS # BLD AUTO: 0.2 % (ref 0–1.8)
BASOPHILS # BLD: 0.02 K/UL (ref 0–0.12)
BILIRUB SERPL-MCNC: 1 MG/DL (ref 0.1–1.5)
BUN SERPL-MCNC: 12 MG/DL (ref 8–22)
CALCIUM ALBUM COR SERPL-MCNC: 9.4 MG/DL (ref 8.5–10.5)
CALCIUM SERPL-MCNC: 9.5 MG/DL (ref 8.5–10.5)
CHLORIDE SERPL-SCNC: 105 MMOL/L (ref 96–112)
CO2 SERPL-SCNC: 27 MMOL/L (ref 20–33)
CREAT SERPL-MCNC: 1.06 MG/DL (ref 0.5–1.4)
EKG IMPRESSION: NORMAL
EOSINOPHIL # BLD AUTO: 0.11 K/UL (ref 0–0.51)
EOSINOPHIL NFR BLD: 0.9 % (ref 0–6.9)
ERYTHROCYTE [DISTWIDTH] IN BLOOD BY AUTOMATED COUNT: 52.4 FL (ref 35.9–50)
FLUAV RNA SPEC QL NAA+PROBE: NEGATIVE
FLUAV RNA SPEC QL NAA+PROBE: NEGATIVE
FLUBV RNA SPEC QL NAA+PROBE: NEGATIVE
FLUBV RNA SPEC QL NAA+PROBE: NEGATIVE
GFR SERPLBLD CREATININE-BSD FMLA CKD-EPI: 64 ML/MIN/1.73 M 2
GLOBULIN SER CALC-MCNC: 3 G/DL (ref 1.9–3.5)
GLUCOSE SERPL-MCNC: 113 MG/DL (ref 65–99)
HCT VFR BLD AUTO: 43.9 % (ref 37–47)
HGB BLD-MCNC: 13.4 G/DL (ref 12–16)
IMM GRANULOCYTES # BLD AUTO: 0.07 K/UL (ref 0–0.11)
IMM GRANULOCYTES NFR BLD AUTO: 0.5 % (ref 0–0.9)
LYMPHOCYTES # BLD AUTO: 1.52 K/UL (ref 1–4.8)
LYMPHOCYTES NFR BLD: 11.8 % (ref 22–41)
MCH RBC QN AUTO: 26.8 PG (ref 27–33)
MCHC RBC AUTO-ENTMCNC: 30.5 G/DL (ref 32.2–35.5)
MCV RBC AUTO: 87.8 FL (ref 81.4–97.8)
MONOCYTES # BLD AUTO: 0.81 K/UL (ref 0–0.85)
MONOCYTES NFR BLD AUTO: 6.3 % (ref 0–13.4)
NEUTROPHILS # BLD AUTO: 10.33 K/UL (ref 1.82–7.42)
NEUTROPHILS NFR BLD: 80.3 % (ref 44–72)
NRBC # BLD AUTO: 0 K/UL
NRBC BLD-RTO: 0 /100 WBC (ref 0–0.2)
PLATELET # BLD AUTO: 250 K/UL (ref 164–446)
PMV BLD AUTO: 11.6 FL (ref 9–12.9)
POTASSIUM SERPL-SCNC: 4.1 MMOL/L (ref 3.6–5.5)
PROT SERPL-MCNC: 7.1 G/DL (ref 6–8.2)
RBC # BLD AUTO: 5 M/UL (ref 4.2–5.4)
RSV RNA SPEC QL NAA+PROBE: NEGATIVE
RSV RNA SPEC QL NAA+PROBE: NEGATIVE
SARS-COV-2 RNA RESP QL NAA+PROBE: NOTDETECTED
SARS-COV-2 RNA RESP QL NAA+PROBE: NOTDETECTED
SODIUM SERPL-SCNC: 143 MMOL/L (ref 135–145)
TROPONIN T SERPL-MCNC: 29 NG/L (ref 6–19)
WBC # BLD AUTO: 12.9 K/UL (ref 4.8–10.8)

## 2024-02-06 PROCEDURE — 99284 EMERGENCY DEPT VISIT MOD MDM: CPT

## 2024-02-06 PROCEDURE — 93005 ELECTROCARDIOGRAM TRACING: CPT | Performed by: EMERGENCY MEDICINE

## 2024-02-06 PROCEDURE — 85025 COMPLETE CBC W/AUTO DIFF WBC: CPT

## 2024-02-06 PROCEDURE — 71045 X-RAY EXAM CHEST 1 VIEW: CPT

## 2024-02-06 PROCEDURE — 36415 COLL VENOUS BLD VENIPUNCTURE: CPT

## 2024-02-06 PROCEDURE — 0241U HCHG SARS-COV-2 COVID-19 NFCT DS RESP RNA 4 TRGT ED POC: CPT

## 2024-02-06 PROCEDURE — 84484 ASSAY OF TROPONIN QUANT: CPT

## 2024-02-06 PROCEDURE — 93005 ELECTROCARDIOGRAM TRACING: CPT

## 2024-02-06 PROCEDURE — 80053 COMPREHEN METABOLIC PANEL: CPT

## 2024-02-06 RX ORDER — ONDANSETRON 4 MG/1
4 TABLET, ORALLY DISINTEGRATING ORAL EVERY 8 HOURS PRN
Qty: 20 TABLET | Refills: 0 | Status: SHIPPED | OUTPATIENT
Start: 2024-02-06 | End: 2024-02-16

## 2024-02-06 ASSESSMENT — FIBROSIS 4 INDEX: FIB4 SCORE: 0.78

## 2024-02-06 NOTE — ED TRIAGE NOTES
".  Chief Complaint   Patient presents with    Cold Symptoms     T[place] from home for complaints of cough, fatigue, sore throat, headache, chest pain with cough, sob since yesterday. Hx COPD, afib on eliquis. Seen here two days ago.        49 yo female BIBA to triage for above complaint. EKG completed on arrival. CP/SOB protocol placed. COVID swabbed and processed in triage.      Pt is alert, speaking in full sentences, follows commands and responds appropriately to questions. No apparent distress.     Patient placed back for phlebotomist and educated on triage process. Asked to inform RN of any changes or concerns.     BP (!) 161/66   Pulse 71   Temp 36.4 °C (97.6 °F) (Temporal)   Resp 13   Ht 1.549 m (5' 1\")   Wt 122 kg (268 lb)   LMP  (LMP Unknown)   SpO2 94%   BMI 50.64 kg/m²     "

## 2024-02-06 NOTE — ED PROVIDER NOTES
ED Provider Note    CHIEF COMPLAINT  Chief Complaint   Patient presents with    Cold Symptoms     BIB [facility] from home for complaints of cough, fatigue, sore throat, headache, chest pain with cough, sob since yesterday. Hx COPD, afib on eliquis. Seen here two days ago.        EXTERNAL RECORDS REVIEWED  Outpatient Notes the patient was seen here 2 days ago for rash and diagnosed with cutaneous candidiasis    HPI/ROS  LIMITATION TO HISTORY   Select: : None  OUTSIDE HISTORIAN(S):  None    Lorene Haro is a 48 y.o. female who presents with past medical history seen for A-fib, asthma, bipolar 2 disorder, recent Candida infection of the skin diagnosed 2 days ago, she has developed fever cough runny nose.  She has pain when she coughs in her chest.    PAST MEDICAL HISTORY   has a past medical history of A-fib ([facility]), MONI (acute kidney injury) ([facility]) (2023), Asthma, Bipolar 2 disorder ([facility]), Chickenpox, Chronic obstructive pulmonary disease (HCC), Hypertension, On home oxygen therapy, Other psychological stress, Schizophrenic disorder ([facility]), and Yeast infection of the skin (2021).    SURGICAL HISTORY   has a past surgical history that includes hysterectomy laparoscopy; colectomy; cholecystectomy; and knee arthroscopy (Left).    FAMILY HISTORY  Family History   Problem Relation Age of Onset    No Known Problems Mother     Cancer Sister        SOCIAL HISTORY  Social History     Tobacco Use    Smoking status: Former     Current packs/day: 0.00     Types: Cigarettes     Quit date: 10/12/2022     Years since quittin.3    Smokeless tobacco: Never   Vaping Use    Vaping Use: Never used   Substance and Sexual Activity    Alcohol use: Not Currently    Drug use: Never    Sexual activity: Not Currently       CURRENT MEDICATIONS  Home Medications       Reviewed by Lidia Lanza R.N. (Registered Nurse) on 24 at 1133  Med List Status: Partial     Medication Last Dose Status   acetaminophen (TYLENOL) 500 MG Tab   "Active   ADVAIR DISKUS 100-50 MCG/ACT AEROSOL POWDER, BREATH ACTIVATED  Active   albuterol 108 (90 Base) MCG/ACT Aero Soln inhalation aerosol  Active   apixaban (ELIQUIS) 5mg Tab  Active   aripiprazole (ABILIFY) 30 MG tablet  Active   atorvastatin (LIPITOR) 20 MG Tab  Active   benzonatate (TESSALON) 100 MG Cap  Active   fluticasone (FLONASE) 50 MCG/ACT nasal spray  Active   furosemide (LASIX) 20 MG Tab  Active   Lidocaine 4 % Patch  Active   methocarbamol (ROBAXIN) 500 MG Tab  Active   montelukast (SINGULAIR) 10 MG Tab  Active   nystatin (MYCOSTATIN) powder  Active                    ALLERGIES  Allergies   Allergen Reactions    Penicillin G Rash and Itching     pt reports that she gets a rash all over her body and gets itchy    Amoxicillin Rash and Itching     Tolerates cephalosporins, pt reports that she gets a rash all over her body and gets itchy       PHYSICAL EXAM  VITAL SIGNS: BP (!) 161/66   Pulse 71   Temp 36.4 °C (97.6 °F) (Temporal)   Resp 13   Ht 1.549 m (5' 1\")   Wt 122 kg (268 lb)   LMP  (LMP Unknown)   SpO2 94%   BMI 50.64 kg/m²    Constitutional: Alert.  HENT: No signs of trauma, Bilateral external ears normal, Nose normal.   Eyes: Pupils are equal and reactive, Conjunctiva normal, Non-icteric.   Neck: Normal range of motion, No tenderness, Supple, No stridor.   Lymphatic: No lymphadenopathy noted.   Cardiovascular: Regular rate and rhythm, no murmurs.   Thorax & Lungs: Normal breath sounds, No respiratory distress, No wheezing, No chest tenderness.   Abdomen: Bowel sounds normal, Soft, No tenderness, No peritoneal signs, No masses, No pulsatile masses.   Skin: Warm, Dry, No erythema, No rash.   Back: No bony tenderness, No CVA tenderness.   Extremities: Intact distal pulses, No edema, No tenderness, No cyanosis.  Musculoskeletal: Good range of motion in all major joints. No tenderness to palpation or major deformities noted.   Neurologic: Alert , Normal motor function, Normal sensory " function, No focal deficits noted.   Psychiatric: Affect normal, Judgment normal, Mood normal.         DIAGNOSTIC STUDIES / PROCEDURES  EKG  I have independently interpreted this EKG  Sinus rhythm rate 66  axis normal  intervals normal  Probable left atrial enlargement  Low voltage, precordial leads  RSR' in V1 or V2, probably normal variant  Borderline T abnormalities, inferior leads  Compared to ECG 01/24/2024 18:54:42  Low QRS voltage now present  RSR' in V1 or V2 now present  T-wave abnormality now present  My impression of this EKG, nonspecific changes, does not indicate ischemia nor arrhythmia at this time    LABS  Labs Reviewed   CBC WITH DIFFERENTIAL - Abnormal; Notable for the following components:       Result Value    WBC 12.9 (*)     MCH 26.8 (*)     MCHC 30.5 (*)     RDW 52.4 (*)     Neutrophils-Polys 80.30 (*)     Lymphocytes 11.80 (*)     Neutrophils (Absolute) 10.33 (*)     All other components within normal limits    Narrative:     Biotin intake of greater than 5 mg per day may interfere with  troponin levels, causing false low values.   COMP METABOLIC PANEL - Abnormal; Notable for the following components:    Glucose 113 (*)     AST(SGOT) 6 (*)     Alkaline Phosphatase 138 (*)     All other components within normal limits    Narrative:     Biotin intake of greater than 5 mg per day may interfere with  troponin levels, causing false low values.   TROPONIN - Abnormal; Notable for the following components:    Troponin T 29 (*)     All other components within normal limits    Narrative:     Biotin intake of greater than 5 mg per day may interfere with  troponin levels, causing false low values.   ESTIMATED GFR    Narrative:     Biotin intake of greater than 5 mg per day may interfere with  troponin levels, causing false low values.   POCT COV-2, FLU A/B, RSV BY PCR   POC COV-2, FLU A/B, RSV BY PCR   POC COV-2, FLU A/B, RSV BY PCR         RADIOLOGY  I have independently interpreted the diagnostic  imaging associated with this visit and am waiting the final reading from the radiologist.   My preliminary interpretation is as follows: Negative chest x-ray for acute disease  Radiologist interpretation:     DX-CHEST-PORTABLE (1 VIEW)   Final Result      No acute cardiopulmonary disease evident.            COURSE & MEDICAL DECISION MAKING    ED Observation Status? No; Patient does not meet criteria for ED Observation.     INITIAL ASSESSMENT, COURSE AND PLAN  Care Narrative:     The patient presents with upper respiratory symptoms.  RSV/COVID/RSV was ordered, chest x-ray was ordered, labs ordered.      2:21 PM all of the patient's results are unremarkable.  Her troponin is slightly elevated but at her baseline.  She is given instructions on upper respiratory infection.  I have prescribed her Zofran.          ADDITIONAL PROBLEM LIST  A-fib, asthma, bipolar disorder  DISPOSITION AND DISCUSSIONS  I have discussed management of the patient with the following physicians and JAY's: None    Discussion of management with other Q or appropriate source(s): None     Escalation of care considered, and ultimately not performed:acute inpatient care management, however at this time, the patient is most appropriate for outpatient management    Barriers to care at this time, including but not limited to:  None .     Decision tools and prescription drugs considered including, but not limited to:  I prescribed the patient Zofran for nausea.    The patient will return for new or worsening symptoms and is stable at the time of discharge.    The patient is referred to a primary physician for blood pressure management, diabetic screening, and for all other preventative health concerns.        DISPOSITION:  Patient will be discharged home in stable condition.    FOLLOW UP:  [facility], Emergency Dept  [address] [facility] [facility] [id number] [phone]    If symptoms worsen      OUTPATIENT MEDICATIONS:  New  Prescriptions    ONDANSETRON (ZOFRAN ODT) 4 MG TABLET DISPERSIBLE    Take 1 Tablet by mouth every 8 hours as needed for Nausea/Vomiting.         FINAL DIAGNOSIS  1. Upper respiratory tract infection, unspecified type    2. Cutaneous candidiasis           Electronically signed by: Jeremias Limon M.D., 2/6/2024 2:02 PM

## 2024-02-16 ENCOUNTER — HOSPITAL ENCOUNTER (EMERGENCY)
Facility: MEDICAL CENTER | Age: 49
End: 2024-02-16
Attending: STUDENT IN AN ORGANIZED HEALTH CARE EDUCATION/TRAINING PROGRAM
Payer: MEDICAID

## 2024-02-16 VITALS
DIASTOLIC BLOOD PRESSURE: 78 MMHG | SYSTOLIC BLOOD PRESSURE: 122 MMHG | OXYGEN SATURATION: 92 % | RESPIRATION RATE: 18 BRPM | HEART RATE: 73 BPM | TEMPERATURE: 98 F | BODY MASS INDEX: 50.78 KG/M2 | HEIGHT: 61 IN | WEIGHT: 268.96 LBS

## 2024-02-16 DIAGNOSIS — R06.2 WHEEZING: ICD-10-CM

## 2024-02-16 DIAGNOSIS — R19.7 DIARRHEA, UNSPECIFIED TYPE: ICD-10-CM

## 2024-02-16 DIAGNOSIS — J06.9 VIRAL UPPER RESPIRATORY TRACT INFECTION: ICD-10-CM

## 2024-02-16 DIAGNOSIS — R11.0 NAUSEA: ICD-10-CM

## 2024-02-16 PROCEDURE — A9270 NON-COVERED ITEM OR SERVICE: HCPCS | Mod: UD | Performed by: STUDENT IN AN ORGANIZED HEALTH CARE EDUCATION/TRAINING PROGRAM

## 2024-02-16 PROCEDURE — 0241U HCHG SARS-COV-2 COVID-19 NFCT DS RESP RNA 4 TRGT ED POC: CPT

## 2024-02-16 PROCEDURE — 96372 THER/PROPH/DIAG INJ SC/IM: CPT

## 2024-02-16 PROCEDURE — 700111 HCHG RX REV CODE 636 W/ 250 OVERRIDE (IP): Mod: UD | Performed by: STUDENT IN AN ORGANIZED HEALTH CARE EDUCATION/TRAINING PROGRAM

## 2024-02-16 PROCEDURE — 99284 EMERGENCY DEPT VISIT MOD MDM: CPT

## 2024-02-16 PROCEDURE — 700102 HCHG RX REV CODE 250 W/ 637 OVERRIDE(OP): Mod: UD | Performed by: STUDENT IN AN ORGANIZED HEALTH CARE EDUCATION/TRAINING PROGRAM

## 2024-02-16 RX ORDER — ACETAMINOPHEN 500 MG
1000 TABLET ORAL ONCE
Status: COMPLETED | OUTPATIENT
Start: 2024-02-16 | End: 2024-02-16

## 2024-02-16 RX ORDER — KETOROLAC TROMETHAMINE 15 MG/ML
15 INJECTION, SOLUTION INTRAMUSCULAR; INTRAVENOUS ONCE
Status: COMPLETED | OUTPATIENT
Start: 2024-02-16 | End: 2024-02-16

## 2024-02-16 RX ORDER — PREDNISONE 10 MG/1
50 TABLET ORAL DAILY
Qty: 20 TABLET | Refills: 0 | Status: SHIPPED | OUTPATIENT
Start: 2024-02-16 | End: 2024-02-20

## 2024-02-16 RX ORDER — KETOROLAC TROMETHAMINE 15 MG/ML
15 INJECTION, SOLUTION INTRAMUSCULAR; INTRAVENOUS ONCE
Status: DISCONTINUED | OUTPATIENT
Start: 2024-02-16 | End: 2024-02-16

## 2024-02-16 RX ORDER — ONDANSETRON 4 MG/1
4 TABLET, ORALLY DISINTEGRATING ORAL ONCE
Status: COMPLETED | OUTPATIENT
Start: 2024-02-16 | End: 2024-02-16

## 2024-02-16 RX ORDER — ONDANSETRON 4 MG/1
4 TABLET, ORALLY DISINTEGRATING ORAL EVERY 6 HOURS PRN
Qty: 10 TABLET | Refills: 0 | Status: SHIPPED | OUTPATIENT
Start: 2024-02-16

## 2024-02-16 RX ORDER — DICYCLOMINE HCL 20 MG
20 TABLET ORAL EVERY 6 HOURS PRN
Qty: 20 TABLET | Refills: 0 | Status: SHIPPED | OUTPATIENT
Start: 2024-02-16

## 2024-02-16 RX ORDER — DICYCLOMINE HYDROCHLORIDE 10 MG/ML
20 INJECTION INTRAMUSCULAR ONCE
Status: COMPLETED | OUTPATIENT
Start: 2024-02-16 | End: 2024-02-16

## 2024-02-16 RX ORDER — ALBUTEROL SULFATE 90 UG/1
2 AEROSOL, METERED RESPIRATORY (INHALATION) ONCE
Status: COMPLETED | OUTPATIENT
Start: 2024-02-16 | End: 2024-02-16

## 2024-02-16 RX ADMIN — KETOROLAC TROMETHAMINE 15 MG: 15 INJECTION, SOLUTION INTRAMUSCULAR; INTRAVENOUS at 17:53

## 2024-02-16 RX ADMIN — ACETAMINOPHEN 1000 MG: 500 TABLET ORAL at 17:51

## 2024-02-16 RX ADMIN — DICYCLOMINE HYDROCHLORIDE 20 MG: 20 INJECTION, SOLUTION INTRAMUSCULAR at 17:53

## 2024-02-16 RX ADMIN — PREDNISONE 50 MG: 20 TABLET ORAL at 17:51

## 2024-02-16 RX ADMIN — ONDANSETRON 4 MG: 4 TABLET, ORALLY DISINTEGRATING ORAL at 17:51

## 2024-02-16 RX ADMIN — ALBUTEROL SULFATE 2 PUFF: 90 AEROSOL, METERED RESPIRATORY (INHALATION) at 17:52

## 2024-02-16 ASSESSMENT — FIBROSIS 4 INDEX: FIB4 SCORE: .3464101615137754587

## 2024-02-17 NOTE — ED TRIAGE NOTES
"Chief Complaint   Patient presents with    Flu Like Symptoms     The pt reports body aches, nausea, fatigue, cough, loss of taste and smell. Symptoms since Wednesday.         Pt wheelchair to triage. Pt A&Ox4, for the above complaint.     Pt to lobby . Pt educated on alerting staff in changes to condition. Pt verbalized understanding. Protocol COVID ordered.     /63   Pulse 68   Temp 36.8 °C (98.3 °F) (Oral)   Resp 20   Ht 1.549 m (5' 1\")   Wt 122 kg (268 lb 15.4 oz)   LMP  (LMP Unknown)   SpO2 94%   BMI 50.82 kg/m²     "

## 2024-02-17 NOTE — ED NOTES
Bedside report received from Ike  Assumed patient care. Verified patient identification.  Checked on bed, connected to monitor,  with unlabored respirations. Discussed plan of care.   Vital signs is stable. Denied any new complaints.   Gurney in low position, side rail up for pt safety. Call light within reach.   No needs identified at the moment. Instructed to use call light when needed.

## 2024-02-17 NOTE — ED PROVIDER NOTES
ED Provider Note    CHIEF COMPLAINT  Chief Complaint   Patient presents with    Flu Like Symptoms     The pt reports body aches, nausea, fatigue, cough, loss of taste and smell. Symptoms since Wednesday.         EXTERNAL RECORDS REVIEWED  Outpatient Notes cardiac remote monitoring visit on 2024    HPI/ROS  LIMITATION TO HISTORY   Select: : None  OUTSIDE HISTORIAN(S):    Lorene Haro is a 48 y.o. female who presents with flulike symptoms, body aches, nausea, fatigue, cough, chills, body aches, sore throat, loss of taste and swell, shortness of breath since Wednesday.  Patient denies chest pain.  Patient has a history of asthma.  Patient denies worsening lower extremity swelling compared to baseline.  Patient endorses some diarrhea as well without abdominal pain.    PAST MEDICAL HISTORY   has a past medical history of A-fib ([facility]), MONI (acute kidney injury) ([facility]) (2023), Asthma, Bipolar 2 disorder (HCC), Chickenpox, Chronic obstructive pulmonary disease (HCC), Hypertension, On home oxygen therapy, Other psychological stress, Schizophrenic disorder (HCC), and Yeast infection of the skin (2021).    SURGICAL HISTORY   has a past surgical history that includes hysterectomy laparoscopy; colectomy; cholecystectomy; and knee arthroscopy (Left).    FAMILY HISTORY  Family History   Problem Relation Age of Onset    No Known Problems Mother     Cancer Sister        SOCIAL HISTORY  Social History     Tobacco Use    Smoking status: Former     Current packs/day: 0.00     Types: Cigarettes     Quit date: 10/12/2022     Years since quittin.3    Smokeless tobacco: Never   Vaping Use    Vaping Use: Never used   Substance and Sexual Activity    Alcohol use: Not Currently    Drug use: Never    Sexual activity: Not Currently       CURRENT MEDICATIONS  Home Medications       Reviewed by Jeremias Kimball R.N. (Registered Nurse) on 24 at 1612  Med List Status: Partial     Medication Last Dose Status   acetaminophen  "(TYLENOL) 500 MG Tab  Active   ADVAIR DISKUS 100-50 MCG/ACT AEROSOL POWDER, BREATH ACTIVATED  Active   albuterol 108 (90 Base) MCG/ACT Aero Soln inhalation aerosol  Active   apixaban (ELIQUIS) 5mg Tab  Active   aripiprazole (ABILIFY) 30 MG tablet  Active   atorvastatin (LIPITOR) 20 MG Tab  Active   benzonatate (TESSALON) 100 MG Cap  Active   fluticasone (FLONASE) 50 MCG/ACT nasal spray  Active   furosemide (LASIX) 20 MG Tab  Active   Lidocaine 4 % Patch  Active   methocarbamol (ROBAXIN) 500 MG Tab  Active   montelukast (SINGULAIR) 10 MG Tab  Active   nystatin (MYCOSTATIN) powder  Active   ondansetron (ZOFRAN ODT) 4 MG TABLET DISPERSIBLE  Active                    ALLERGIES  Allergies   Allergen Reactions    Penicillin G Rash and Itching     pt reports that she gets a rash all over her body and gets itchy    Amoxicillin Rash and Itching     Tolerates cephalosporins, pt reports that she gets a rash all over her body and gets itchy       PHYSICAL EXAM  VITAL SIGNS: /63   Pulse 68   Temp 36.8 °C (98.3 °F) (Oral)   Resp 20   Ht 1.549 m (5' 1\")   Wt 122 kg (268 lb 15.4 oz)   LMP  (LMP Unknown)   SpO2 94%   BMI 50.82 kg/m²    Vitals and nursing note reviewed.   Constitutional:       Comments: Patient is lying in bed supine, pleasant, conversant, speaking in complete sentences   HENT:      Head: Normocephalic and atraumatic.   Posterior oropharyngeal erythema  Eyes:      Extraocular Movements: Extraocular movements intact.      Conjunctiva/sclera: Conjunctivae normal.      Pupils: Pupils are equal, round, and reactive to light.   Cardiovascular:      Pulses: Normal pulses.      Comments: HR 68  Pulmonary:      Effort: Pulmonary effort is normal. No respiratory distress.   Scant expiratory wheezes in the upper lung fields, no inspiratory rales  Abdomen:  No rebound, guarding, tenderness  Musculoskeletal:         General: Scant peripheral edema unchanged from prior. Normal range of motion.      Cervical back: " Normal range of motion. No rigidity.   Skin:     General: Skin is warm and dry.      Capillary Refill: Capillary refill takes less than 2 seconds.   Neurological:      Mental Status: Alert.       DIAGNOSTIC STUDIES / PROCEDURES    LABS  PCR negative for flu, COVID, RSV      COURSE & MEDICAL DECISION MAKING      INITIAL ASSESSMENT, COURSE AND PLAN  Care Narrative: Patient has no meningismus, acting appropriately, no confusion, meningitis versus encephalitis is inconsistent with patient presentation at this time.  Patient has no posterior oropharyngeal erythema or exudates, no lymphadenopathy, strep pharyngitis is inconsistent with patient presentation at this time.  Tympanic membranes have no evidence of air-fluid levels, exudates, loss of light reflex, perforation or purulent drainage, otitis media is inconsistent with patient presentation at this time.  Lungs are clear to auscultation, no hypoxia, no evidence of rales, pneumonia is inconsistent with patient presentation at this time.  Abdomen is soft, nontender, nonrigid, acute intra-abdominal process such as intussusception or appendicitis is inconsistent with patient presentation at this time. Patient's vital signs are within normal limits, sepsis is inconsistent with patient presentation at this time. I believe it is likely that this patient is suffering from an acute viral infection.  Patient will be treated symptomatically.  Patient also may be suffering from a COPD exacerbation, will be given albuterol and prednisone.  Disposition pending symptom improvement.    Electronically signed by: Cirilo Looney M.D., 2/16/2024 5:31 PM    Patient reports that her body aches have resolved, shortness of breath is improved, nausea and diarrhea have resolved as well.  Patient discharged with prednisone for suspected COPD exacerbation, Bentyl for diarrhea and Zofran for nausea.  Patient counseled to return for worsening shortness of breath.    Repeat physical exam  benign.  I doubt any serious emergency process at this time.  Patient and/or family, friends given strict return precautions for worsening symptoms and care instructions. They have demonstrated understanding of discharge instructions through teach back mechanism. Advised PCP follow-up in 1-2 days.  Patient/family/friend expresses understanding and agrees to plan.    This dictation has been created using voice recognition software. I am continuously working with the software to minimize the number of voice recognition errors and I have made every attempt to manually correct the errors within my dictation. However errors  related to this voice recognition software may still exist and should be interpreted within the appropriate context.     Electronically signed by: Cirilo Looney M.D., 2/16/2024 7:20 PM    DISPOSITION AND DISCUSSIONS    Escalation of care considered, and ultimately not performed:blood analysis and diagnostic imaging    Barriers to care at this time, including but not limited to: Patient does not have established PCP.     Decision tools and prescription drugs considered including, but not limited to: Antibiotics not indicated in the absence of bacterial infection .    FINAL DIAGNOSIS  1. Viral upper respiratory tract infection    2. Nausea    3. Diarrhea, unspecified type    4. Wheezing           Electronically signed by: Cirilo Looney M.D., 2/16/2024 5:28 PM

## 2024-02-17 NOTE — ED NOTES
Discharged in stable condition, alert and oriented, ambulatory. Prescribed medication and follow up appointment instructed. Health education imparted. Instructed to come back once symptoms worsened. Pt verbalized understanding of the information given.    Provided MTM number ,pt ambulate to lobby will call the number.

## 2024-02-20 ENCOUNTER — OFFICE VISIT (OUTPATIENT)
Dept: CARDIOLOGY | Facility: MEDICAL CENTER | Age: 49
End: 2024-02-20
Attending: NURSE PRACTITIONER
Payer: MEDICAID

## 2024-02-20 ENCOUNTER — OFFICE VISIT (OUTPATIENT)
Dept: CARDIOLOGY | Facility: MEDICAL CENTER | Age: 49
End: 2024-02-20
Attending: INTERNAL MEDICINE
Payer: MEDICAID

## 2024-02-20 VITALS
HEIGHT: 61 IN | HEART RATE: 75 BPM | RESPIRATION RATE: 14 BRPM | OXYGEN SATURATION: 94 % | WEIGHT: 268 LBS | DIASTOLIC BLOOD PRESSURE: 82 MMHG | BODY MASS INDEX: 50.6 KG/M2 | SYSTOLIC BLOOD PRESSURE: 124 MMHG

## 2024-02-20 DIAGNOSIS — I48.0 PAROXYSMAL ATRIAL FIBRILLATION (HCC): ICD-10-CM

## 2024-02-20 DIAGNOSIS — I48.0 PAF (PAROXYSMAL ATRIAL FIBRILLATION) (HCC): ICD-10-CM

## 2024-02-20 PROBLEM — I50.32 CHRONIC DIASTOLIC HEART FAILURE (HCC): Status: RESOLVED | Noted: 2022-07-13 | Resolved: 2024-02-20

## 2024-02-20 PROCEDURE — 99213 OFFICE O/P EST LOW 20 MIN: CPT | Performed by: NURSE PRACTITIONER

## 2024-02-20 PROCEDURE — 99212 OFFICE O/P EST SF 10 MIN: CPT | Performed by: NURSE PRACTITIONER

## 2024-02-20 PROCEDURE — 93005 ELECTROCARDIOGRAM TRACING: CPT | Performed by: NURSE PRACTITIONER

## 2024-02-20 PROCEDURE — 99999 PR NO CHARGE: CPT | Performed by: INTERNAL MEDICINE

## 2024-02-20 PROCEDURE — 3079F DIAST BP 80-89 MM HG: CPT | Performed by: NURSE PRACTITIONER

## 2024-02-20 PROCEDURE — 93291 INTERROG DEV EVAL SCRMS IP: CPT | Performed by: NURSE PRACTITIONER

## 2024-02-20 PROCEDURE — 3074F SYST BP LT 130 MM HG: CPT | Performed by: NURSE PRACTITIONER

## 2024-02-20 ASSESSMENT — FIBROSIS 4 INDEX: FIB4 SCORE: .3464101615137754587

## 2024-02-21 ENCOUNTER — HOSPITAL ENCOUNTER (OUTPATIENT)
Dept: LAB | Facility: MEDICAL CENTER | Age: 49
End: 2024-02-21
Attending: NURSE PRACTITIONER
Payer: MEDICAID

## 2024-02-21 ENCOUNTER — OFFICE VISIT (OUTPATIENT)
Dept: URGENT CARE | Facility: CLINIC | Age: 49
End: 2024-02-21
Payer: MEDICAID

## 2024-02-21 ENCOUNTER — APPOINTMENT (OUTPATIENT)
Dept: URGENT CARE | Facility: PHYSICIAN GROUP | Age: 49
End: 2024-02-21
Payer: MEDICAID

## 2024-02-21 VITALS
DIASTOLIC BLOOD PRESSURE: 58 MMHG | WEIGHT: 270.2 LBS | SYSTOLIC BLOOD PRESSURE: 120 MMHG | BODY MASS INDEX: 49.72 KG/M2 | OXYGEN SATURATION: 97 % | HEIGHT: 62 IN | RESPIRATION RATE: 20 BRPM | HEART RATE: 78 BPM | TEMPERATURE: 97.8 F

## 2024-02-21 DIAGNOSIS — I48.0 PAROXYSMAL ATRIAL FIBRILLATION (HCC): ICD-10-CM

## 2024-02-21 DIAGNOSIS — L03.313 CELLULITIS OF CHEST WALL: ICD-10-CM

## 2024-02-21 PROBLEM — I48.91 ATRIAL FIBRILLATION (HCC): Status: RESOLVED | Noted: 2024-01-17 | Resolved: 2024-02-21

## 2024-02-21 LAB
ALBUMIN SERPL BCP-MCNC: 3.6 G/DL (ref 3.2–4.9)
ALBUMIN/GLOB SERPL: 1.2 G/DL
ALP SERPL-CCNC: 118 U/L (ref 30–99)
ALT SERPL-CCNC: 12 U/L (ref 2–50)
ANION GAP SERPL CALC-SCNC: 9 MMOL/L (ref 7–16)
AST SERPL-CCNC: 12 U/L (ref 12–45)
BILIRUB SERPL-MCNC: 0.9 MG/DL (ref 0.1–1.5)
BUN SERPL-MCNC: 10 MG/DL (ref 8–22)
CALCIUM ALBUM COR SERPL-MCNC: 9.5 MG/DL (ref 8.5–10.5)
CALCIUM SERPL-MCNC: 9.2 MG/DL (ref 8.5–10.5)
CHLORIDE SERPL-SCNC: 106 MMOL/L (ref 96–112)
CO2 SERPL-SCNC: 26 MMOL/L (ref 20–33)
CREAT SERPL-MCNC: 1.05 MG/DL (ref 0.5–1.4)
ERYTHROCYTE [DISTWIDTH] IN BLOOD BY AUTOMATED COUNT: 51.1 FL (ref 35.9–50)
GFR SERPLBLD CREATININE-BSD FMLA CKD-EPI: 65 ML/MIN/1.73 M 2
GLOBULIN SER CALC-MCNC: 3.1 G/DL (ref 1.9–3.5)
GLUCOSE SERPL-MCNC: 122 MG/DL (ref 65–99)
HCT VFR BLD AUTO: 42.1 % (ref 37–47)
HGB BLD-MCNC: 13.3 G/DL (ref 12–16)
MCH RBC QN AUTO: 27.4 PG (ref 27–33)
MCHC RBC AUTO-ENTMCNC: 31.6 G/DL (ref 32.2–35.5)
MCV RBC AUTO: 86.8 FL (ref 81.4–97.8)
PLATELET # BLD AUTO: 283 K/UL (ref 164–446)
PMV BLD AUTO: 11.5 FL (ref 9–12.9)
POTASSIUM SERPL-SCNC: 4.1 MMOL/L (ref 3.6–5.5)
PROT SERPL-MCNC: 6.7 G/DL (ref 6–8.2)
RBC # BLD AUTO: 4.85 M/UL (ref 4.2–5.4)
SODIUM SERPL-SCNC: 141 MMOL/L (ref 135–145)
WBC # BLD AUTO: 9.8 K/UL (ref 4.8–10.8)

## 2024-02-21 PROCEDURE — 36415 COLL VENOUS BLD VENIPUNCTURE: CPT

## 2024-02-21 PROCEDURE — 85027 COMPLETE CBC AUTOMATED: CPT

## 2024-02-21 PROCEDURE — 99213 OFFICE O/P EST LOW 20 MIN: CPT | Performed by: FAMILY MEDICINE

## 2024-02-21 PROCEDURE — 80053 COMPREHEN METABOLIC PANEL: CPT

## 2024-02-21 PROCEDURE — 3074F SYST BP LT 130 MM HG: CPT | Performed by: FAMILY MEDICINE

## 2024-02-21 PROCEDURE — 3078F DIAST BP <80 MM HG: CPT | Performed by: FAMILY MEDICINE

## 2024-02-21 RX ORDER — SULFAMETHOXAZOLE AND TRIMETHOPRIM 800; 160 MG/1; MG/1
1 TABLET ORAL 2 TIMES DAILY
Qty: 14 TABLET | Refills: 0 | Status: SHIPPED | OUTPATIENT
Start: 2024-02-21 | End: 2024-02-28

## 2024-02-21 ASSESSMENT — ENCOUNTER SYMPTOMS
SENSORY CHANGE: 0
MUSCULOSKELETAL NEGATIVE: 1
DEPRESSION: 0
NEUROLOGICAL NEGATIVE: 1
PALPITATIONS: 0
CLAUDICATION: 0
WHEEZING: 0
SHORTNESS OF BREATH: 0
CONSTITUTIONAL NEGATIVE: 1
DIZZINESS: 0
ORTHOPNEA: 0
HALLUCINATIONS: 0
NAUSEA: 0
RESPIRATORY NEGATIVE: 1
NERVOUS/ANXIOUS: 0
EYES NEGATIVE: 1
PND: 0
GASTROINTESTINAL NEGATIVE: 1
BRUISES/BLEEDS EASILY: 0

## 2024-02-21 ASSESSMENT — FIBROSIS 4 INDEX: FIB4 SCORE: .3464101615137754587

## 2024-02-21 NOTE — PROGRESS NOTES
Atrial Fibrillation Follow up Note    DOS: 2024  [id number]  Lorene Haro    Chief complaint/Reason for consult: recent episode and ILR check    HPI: Pt is a 48 y.o. female who presents to the clinic today in follow up for solitary AF episode. Patient has a past medical history significant for but not limited to: reactive airway disease, KATHIA, paroxysmal atrial fibrillation, loop recorder in situ, COPD. Patient presents today after being told to present to the ED after solitary episode of 2 hours of AF seen on loop recorder. She was started on Eliquis. Patient originally to be seen by Dr. Barraza, however, he had to leave the office and he and I discussed her case prior to his departure. Patient solitary episode coupled with her CHADSVASC score of 1, is not enough to require daily full anticoagulation. Patient and I discussed it and she is alright with not taking the medication if she doesn't have to. She is worried as her mother  of a embolic stroke. We will continue diligent monitoring of her device. Patient didn't really understand why she needed to go to the ED but wanted to stay complaint with her medical care. She follows up with Dr. Borrego in  and we will do another monitor check in office prior to that appointment. She was given instructions to contact me if anything changes.       Past Medical History:   Diagnosis Date    A-fib (HCC)     MONI (acute kidney injury) (HCC) 2023    Asthma     Bipolar 2 disorder (HCC)     Chickenpox     Chronic obstructive pulmonary disease (HCC)     Hypertension     On home oxygen therapy     Other psychological stress     Schizophrenic disorder (HCC)     Yeast infection of the skin 2021       Past Surgical History:   Procedure Laterality Date    CHOLECYSTECTOMY      COLECTOMY      HYSTERECTOMY LAPAROSCOPY      KNEE ARTHROSCOPY Left        Social History     Socioeconomic History    Marital status: Single     Spouse name: Not on file    Number of  children: Not on file    Years of education: Not on file    Highest education level: Not on file   Occupational History    Not on file   Tobacco Use    Smoking status: Former     Current packs/day: 0.00     Types: Cigarettes     Quit date: 10/12/2022     Years since quittin.3    Smokeless tobacco: Never   Vaping Use    Vaping Use: Never used   Substance and Sexual Activity    Alcohol use: Not Currently    Drug use: Never    Sexual activity: Not Currently   Other Topics Concern    Not on file   Social History Narrative    ** Merged History Encounter **         From [place]     Social Determinants of Health     Financial Resource Strain: Medium Risk (2022)    Overall Financial Resource Strain (CARDIA)     Difficulty of Paying Living Expenses: Somewhat hard   Food Insecurity: No Food Insecurity (8/3/2023)    Hunger Vital Sign     Worried About Running Out of Food in the Last Year: Never true     Ran Out of Food in the Last Year: Never true   Transportation Needs: No Transportation Needs (2022)    PRAPARE - Transportation     Lack of Transportation (Medical): No     Lack of Transportation (Non-Medical): No   Physical Activity: Not on file   Stress: Not on file   Social Connections: Not on file   Intimate Partner Violence: Not on file   Housing Stability: Not on file       Family History   Problem Relation Age of Onset    No Known Problems Mother     Cancer Sister        Allergies   Allergen Reactions    Penicillin G Rash and Itching     pt reports that she gets a rash all over her body and gets itchy    Amoxicillin Rash and Itching     Tolerates cephalosporins, pt reports that she gets a rash all over her body and gets itchy       Current Outpatient Medications   Medication Sig Dispense Refill    ondansetron (ZOFRAN ODT) 4 MG TABLET DISPERSIBLE Take 1 Tablet by mouth every 6 hours as needed for Nausea/Vomiting. 10 Tablet 0    dicyclomine (BENTYL) 20 MG Tab Take 1 Tablet by mouth every 6 hours as needed (abd  pain, diarrhea). 20 Tablet 0    nystatin (MYCOSTATIN) powder Apply 1 g topically 3 times a day. 60 g 0    benzonatate (TESSALON) 100 MG Cap Take 1 Capsule by mouth 3 times a day as needed for Cough. 60 Capsule 0    Lidocaine 4 % Patch Apply 1 Each topically every 12 hours. 1 Patch 0    methocarbamol (ROBAXIN) 500 MG Tab Take 1 Tablet by mouth 3 times a day. 21 Tablet 0    acetaminophen (TYLENOL) 500 MG Tab Take 1-2 Tablets by mouth every 6 hours as needed for Moderate Pain. 30 Tablet 0    albuterol 108 (90 Base) MCG/ACT Aero Soln inhalation aerosol Inhale 2 Puffs every 6 hours as needed for Shortness of Breath. 8.5 g 0    fluticasone (FLONASE) 50 MCG/ACT nasal spray Administer 2 Sprays into each nostril every morning.      ADVAIR DISKUS 100-50 MCG/ACT AEROSOL POWDER, BREATH ACTIVATED Inhale 1 Puff 2 times a day.      furosemide (LASIX) 20 MG Tab Take 1 Tablet by mouth every day. 90 Tablet 0    aripiprazole (ABILIFY) 30 MG tablet Take 1 Tablet by mouth every morning. Indications: Major Depressive Disorder 30 Tablet 0    atorvastatin (LIPITOR) 20 MG Tab Take 1 Tablet by mouth at bedtime. 30 Tablet 0    montelukast (SINGULAIR) 10 MG Tab Take 1 Tablet by mouth every evening. 30 Tablet 0    sulfamethoxazole-trimethoprim (BACTRIM DS) 800-160 MG tablet Take 1 Tablet by mouth 2 times a day for 7 days. 14 Tablet 0     No current facility-administered medications for this visit.       Vitals:    02/20/24 1537   BP: 124/82   Pulse: 75   Resp: 14   SpO2: 94%         Review of Systems   Constitutional: Negative.  Negative for malaise/fatigue.   HENT: Negative.     Eyes: Negative.    Respiratory: Negative.  Negative for shortness of breath and wheezing.    Cardiovascular:  Negative for chest pain, palpitations, orthopnea, claudication, leg swelling and PND.   Gastrointestinal: Negative.  Negative for nausea.   Genitourinary: Negative.    Musculoskeletal: Negative.    Skin: Negative.    Neurological: Negative.  Negative for  dizziness and sensory change.   Endo/Heme/Allergies: Negative.  Does not bruise/bleed easily.   Psychiatric/Behavioral:  Negative for depression and hallucinations. The patient is not nervous/anxious.           Device Type: Medtronic ILR  Battery Longevity: good  Lead Impedance: stable and within normal limits  Indication: palpitations  Events: AF solitary episode 2 hours    Device interrogated and programmed by Pola Brown       EKG interpreted by me: Sinus    Physical Exam  Constitutional:       Appearance: Normal appearance.   HENT:      Head: Normocephalic.   Eyes:      Pupils: Pupils are equal, round, and reactive to light.   Neck:      Vascular: No JVD.   Cardiovascular:      Rate and Rhythm: Normal rate and regular rhythm.      Pulses: Normal pulses.      Heart sounds: Normal heart sounds.   Pulmonary:      Effort: Pulmonary effort is normal.      Breath sounds: Normal breath sounds.   Abdominal:      General: Abdomen is flat.      Palpations: Abdomen is soft.   Musculoskeletal:      Cervical back: Normal range of motion.      Right lower leg: No edema.      Left lower leg: No edema.   Skin:     General: Skin is warm and dry.   Neurological:      Mental Status: She is alert and oriented to person, place, and time.   Psychiatric:         Mood and Affect: Mood normal.         Behavior: Behavior normal.          Data:  Lipids:   Lab Results   Component Value Date/Time    CHOLSTRLTOT 130 11/01/2023 01:26 PM    TRIGLYCERIDE 144 11/01/2023 01:26 PM    HDL 44 11/01/2023 01:26 PM    LDL 57 11/01/2023 01:26 PM        BMP:  Lab Results   Component Value Date/Time    SODIUM 143 02/06/2024 1154    POTASSIUM 4.1 02/06/2024 1154    CHLORIDE 105 02/06/2024 1154    CO2 27 02/06/2024 1154    GLUCOSE 113 (H) 02/06/2024 1154    BUN 12 02/06/2024 1154    CREATININE 1.06 02/06/2024 1154    CALCIUM 9.5 02/06/2024 1154    ANION 11.0 02/06/2024 1154       GFR:  Lab Results   Component Value Date/Time    IFAFRICA >60  "02/28/2022 0555    IFNOTAFR >60 02/28/2022 0555        TSH:   Lab Results   Component Value Date/Time    TSHULTRASEN 0.720 11/01/2023 1326       MAGNESIUM:  Lab Results   Component Value Date/Time    MAGNESIUM 2.3 11/05/2023 0129    MAGNESIUM 2.2 08/01/2023 0937    MAGNESIUM 2.2 11/09/2022 0839        THYROXINE (T4):   No results found for: \"FREEDIR\"     CBC:   Lab Results   Component Value Date/Time    WBC 9.8 02/21/2024 07:44 AM    RBC 4.85 02/21/2024 07:44 AM    HEMOGLOBIN 13.3 02/21/2024 07:44 AM    HEMATOCRIT 42.1 02/21/2024 07:44 AM    MCV 86.8 02/21/2024 07:44 AM    MCH 27.4 02/21/2024 07:44 AM    MCHC 31.6 (L) 02/21/2024 07:44 AM    RDW 51.1 (H) 02/21/2024 07:44 AM    PLATELETCT 283 02/21/2024 07:44 AM    MPV 11.5 02/21/2024 07:44 AM    NEUTSPOLYS 80.30 (H) 02/06/2024 11:54 AM    LYMPHOCYTES 11.80 (L) 02/06/2024 11:54 AM    MONOCYTES 6.30 02/06/2024 11:54 AM    EOSINOPHILS 0.90 02/06/2024 11:54 AM    BASOPHILS 0.20 02/06/2024 11:54 AM    IMMGRAN 0.50 02/06/2024 11:54 AM    NRBC 0.00 02/06/2024 11:54 AM    NEUTS 10.33 (H) 02/06/2024 11:54 AM    LYMPHS 1.52 02/06/2024 11:54 AM    MONOS 0.81 02/06/2024 11:54 AM    EOS 0.11 02/06/2024 11:54 AM    BASO 0.02 02/06/2024 11:54 AM    IMMGRANAB 0.07 02/06/2024 11:54 AM    NRBCAB 0.00 02/06/2024 11:54 AM        CBC w/o DIFF  Lab Results   Component Value Date/Time    WBC 9.8 02/21/2024 07:44 AM    RBC 4.85 02/21/2024 07:44 AM    HEMOGLOBIN 13.3 02/21/2024 07:44 AM    MCV 86.8 02/21/2024 07:44 AM    MCH 27.4 02/21/2024 07:44 AM    MCHC 31.6 (L) 02/21/2024 07:44 AM    RDW 51.1 (H) 02/21/2024 07:44 AM    MPV 11.5 02/21/2024 07:44 AM       LIVER:  Lab Results   Component Value Date/Time    ALKPHOSPHAT 138 (H) 02/06/2024 11:54 AM    ASTSGOT 6 (L) 02/06/2024 11:54 AM    ALTSGPT 12 02/06/2024 11:54 AM    TBILIRUBIN 1.0 02/06/2024 11:54 AM       BNP:  Lab Results   Component Value Date/Time    BNPBTYPENAT 45 11/13/2018 07:14 PM       PT/INR:  Lab Results   Component Value " Date/Time    PROTHROMBTM 14.0 01/03/2024 03:12 PM    PROTHROMBTM 13.9 11/03/2023 09:50 PM    PROTHROMBTM 14.0 09/10/2022 04:08 AM    INR 1.07 01/03/2024 03:12 PM    INR 1.05 11/03/2023 09:50 PM    INR 1.09 09/10/2022 04:08 AM             Impression/Plan:  Paroxysmal atrial fibrillation (HCC)   - solitary 2 hour episode not long enough for thrombi development   - CHADSVASC 1   - can hold daily anticoagulation    - continue monitoring via loop recorder   - call with any changes in symptoms   - follow up with Dr. Borrego in June              A total of 28 minutes of time was spent on day of encounter reviewing medical record, performing history and examination, counseling, ordering medication/test/consults, collaborating with referring service, and documentation.    Pola Brown BannerCNP-EP  Cardiac Electrophysiology

## 2024-02-21 NOTE — ASSESSMENT & PLAN NOTE
- solitary 2 hour episode not long enough for thrombi development   - CHADSVASC 1   - can hold daily anticoagulation    - continue monitoring via loop recorder   - call with any changes in symptoms   - follow up with Dr. Borrego in [place]

## 2024-02-21 NOTE — PROGRESS NOTES
"  Subjective:      48 y.o. female presents to urgent care for rash under her right breast that she first noticed last week. There was no inciting event or trauma at that time. There is associated pain, it's constant, it's described achy, currently rated 9.5/10.  She has tried topical creams without significant improvement in symptoms.    She denies any other questions or concerns at this time.    Current problem list, medication, and past medical/surgical history were reviewed in Epic.    ROS  See HPI     Objective:      /58 (BP Location: Left arm, Patient Position: Sitting, BP Cuff Size: Large adult)   Pulse 78   Temp 36.6 °C (97.8 °F) (Temporal)   Resp 20   Ht 1.575 m (5' 2\")   Wt 123 kg (270 lb 3.2 oz)   LMP  (LMP Unknown)   SpO2 97%   BMI 49.42 kg/m²     Physical Exam  Constitutional:       General: She is not in acute distress.     Appearance: She is not diaphoretic.   Cardiovascular:      Rate and Rhythm: Normal rate and regular rhythm.      Heart sounds: Normal heart sounds.   Pulmonary:      Effort: Pulmonary effort is normal. No respiratory distress.      Breath sounds: Normal breath sounds.   Chest:      Comments: Rash under her right breast and along her RUQ abdomen.   Neurological:      Mental Status: She is alert.   Psychiatric:         Mood and Affect: Affect normal.         Judgment: Judgment normal.       Assessment/Plan:     1. Cellulitis of chest wall  Most consistent with cellulitis, prescription for Bactrim has been sent.  She may also use Lotrimin topical as there may be a fungal component as well.   - sulfamethoxazole-trimethoprim (BACTRIM DS) 800-160 MG tablet; Take 1 Tablet by mouth 2 times a day for 7 days.  Dispense: 14 Tablet; Refill: 0      Instructed to return to Urgent Care or nearest Emergency Department if symptoms fail to improve, for any change in condition, further concerns, or new concerning symptoms. Patient states understanding of the plan of care and discharge " instructions.    Carrol Brumfield M.D.

## 2024-02-29 ENCOUNTER — NON-PROVIDER VISIT (OUTPATIENT)
Dept: CARDIOLOGY | Facility: MEDICAL CENTER | Age: 49
End: 2024-02-29
Payer: MEDICAID

## 2024-02-29 LAB — EKG IMPRESSION: NORMAL

## 2024-02-29 PROCEDURE — 93298 REM INTERROG DEV EVAL SCRMS: CPT | Mod: 26 | Performed by: INTERNAL MEDICINE

## 2024-03-01 NOTE — CARDIAC REMOTE MONITOR - SCAN
Device transmission reviewed. Device demonstrated appropriate function.       Electronically Signed by: Rodney Alberto M.D.    3/1/2024  8:58 AM

## 2024-03-06 ENCOUNTER — HOSPITAL ENCOUNTER (EMERGENCY)
Facility: MEDICAL CENTER | Age: 49
End: 2024-03-06
Attending: STUDENT IN AN ORGANIZED HEALTH CARE EDUCATION/TRAINING PROGRAM
Payer: MEDICAID

## 2024-03-06 VITALS
RESPIRATION RATE: 17 BRPM | HEIGHT: 62 IN | SYSTOLIC BLOOD PRESSURE: 122 MMHG | OXYGEN SATURATION: 97 % | TEMPERATURE: 97.2 F | DIASTOLIC BLOOD PRESSURE: 70 MMHG | HEART RATE: 81 BPM | WEIGHT: 268 LBS | BODY MASS INDEX: 49.32 KG/M2

## 2024-03-06 DIAGNOSIS — I48.91 ATRIAL FIBRILLATION, UNSPECIFIED TYPE (HCC): ICD-10-CM

## 2024-03-06 DIAGNOSIS — J02.9 SORE THROAT: ICD-10-CM

## 2024-03-06 DIAGNOSIS — B34.9 VIRAL ILLNESS: ICD-10-CM

## 2024-03-06 PROCEDURE — 700102 HCHG RX REV CODE 250 W/ 637 OVERRIDE(OP): Mod: UD | Performed by: STUDENT IN AN ORGANIZED HEALTH CARE EDUCATION/TRAINING PROGRAM

## 2024-03-06 PROCEDURE — 99283 EMERGENCY DEPT VISIT LOW MDM: CPT

## 2024-03-06 PROCEDURE — 700101 HCHG RX REV CODE 250: Mod: UD | Performed by: STUDENT IN AN ORGANIZED HEALTH CARE EDUCATION/TRAINING PROGRAM

## 2024-03-06 PROCEDURE — 0241U HCHG SARS-COV-2 COVID-19 NFCT DS RESP RNA 4 TRGT ED POC: CPT

## 2024-03-06 PROCEDURE — A9270 NON-COVERED ITEM OR SERVICE: HCPCS | Mod: UD | Performed by: STUDENT IN AN ORGANIZED HEALTH CARE EDUCATION/TRAINING PROGRAM

## 2024-03-06 RX ORDER — ALUMINA, MAGNESIA, AND SIMETHICONE 2400; 2400; 240 MG/30ML; MG/30ML; MG/30ML
10 SUSPENSION ORAL 4 TIMES DAILY PRN
Qty: 560 ML | Refills: 0 | Status: SHIPPED | OUTPATIENT
Start: 2024-03-06 | End: 2024-03-25

## 2024-03-06 RX ORDER — LIDOCAINE HYDROCHLORIDE 20 MG/ML
15 SOLUTION OROPHARYNGEAL ONCE
Qty: 15 ML | Refills: 0 | Status: COMPLETED | OUTPATIENT
Start: 2024-03-06 | End: 2024-03-06

## 2024-03-06 RX ORDER — BENZONATATE 100 MG/1
100 CAPSULE ORAL 3 TIMES DAILY PRN
Qty: 60 CAPSULE | Refills: 0 | Status: SHIPPED | OUTPATIENT
Start: 2024-03-06 | End: 2024-03-25

## 2024-03-06 RX ORDER — BENZONATATE 100 MG/1
100 CAPSULE ORAL 3 TIMES DAILY PRN
Status: DISCONTINUED | OUTPATIENT
Start: 2024-03-06 | End: 2024-03-07 | Stop reason: HOSPADM

## 2024-03-06 RX ORDER — IBUPROFEN 200 MG
400 TABLET ORAL ONCE
Status: COMPLETED | OUTPATIENT
Start: 2024-03-06 | End: 2024-03-06

## 2024-03-06 RX ADMIN — IBUPROFEN 400 MG: 200 TABLET, FILM COATED ORAL at 22:34

## 2024-03-06 RX ADMIN — LIDOCAINE HYDROCHLORIDE 15 ML: 20 SOLUTION ORAL at 22:34

## 2024-03-06 RX ADMIN — BENZONATATE 100 MG: 100 CAPSULE ORAL at 22:34

## 2024-03-06 ASSESSMENT — PAIN DESCRIPTION - PAIN TYPE
TYPE: ACUTE PAIN
TYPE: ACUTE PAIN

## 2024-03-06 ASSESSMENT — FIBROSIS 4 INDEX: FIB4 SCORE: 0.61

## 2024-03-07 NOTE — ED PROVIDER NOTES
"ED Provider Note    CHIEF COMPLAINT  Chief Complaint   Patient presents with    Sore Throat     X 4 days     Flu Like Symptoms       EXTERNAL RECORDS REVIEWED  Outpatient Notes office visit 2024 for cellulitis.  ED visit for viral upper respiratory symptoms in     HPI/ROS  LIMITATION TO HISTORY   Select: Behavior  OUTSIDE HISTORIAN(S):  none    Lorene Haro is a 48 y.o. female who presents with sore throat.  Patient reports sore throat for 4 days.  She also notes headache and \"heavy legs\" patient notes no fevers, no nausea or vomiting, no congestion.  She has a mild cough without productivity.  She has a history of asthma but has not been using her inhalers more.  No known sick contacts.  She is not taking medications for symptoms.  Sore throat is rated as a 4 out of 10 pain.  She is eating and drinking well.    PAST MEDICAL HISTORY   has a past medical history of A-fib ([facility]), MONI (acute kidney injury) ([facility]) (2023), Asthma, Bipolar 2 disorder ([facility]), Chickenpox, Chronic obstructive pulmonary disease ([facility]), Hypertension, On home oxygen therapy, Other psychological stress, Schizophrenic disorder ([facility]), and Yeast infection of the skin (2021).    SURGICAL HISTORY   has a past surgical history that includes hysterectomy laparoscopy; colectomy; cholecystectomy; and knee arthroscopy (Left).    FAMILY HISTORY  Family History   Problem Relation Age of Onset    No Known Problems Mother     Cancer Sister        SOCIAL HISTORY  Social History     Tobacco Use    Smoking status: Former     Current packs/day: 0.00     Types: Cigarettes     Quit date: 10/12/2022     Years since quittin.4    Smokeless tobacco: Never   Vaping Use    Vaping Use: Never used   Substance and Sexual Activity    Alcohol use: Not Currently    Drug use: Never    Sexual activity: Not Currently       CURRENT MEDICATIONS  Home Medications       Reviewed by Maura Lewis R.N. (Registered Nurse) on 24 at 205  Med List " "Status: Not Addressed     Medication Last Dose Status   acetaminophen (TYLENOL) 500 MG Tab  Active   ADVAIR DISKUS 100-50 MCG/ACT AEROSOL POWDER, BREATH ACTIVATED  Active   albuterol 108 (90 Base) MCG/ACT Aero Soln inhalation aerosol  Active   aripiprazole (ABILIFY) 30 MG tablet  Active   atorvastatin (LIPITOR) 20 MG Tab  Active   benzonatate (TESSALON) 100 MG Cap  Active   dicyclomine (BENTYL) 20 MG Tab  Active   fluticasone (FLONASE) 50 MCG/ACT nasal spray  Active   furosemide (LASIX) 20 MG Tab  Active   Lidocaine 4 % Patch  Active   methocarbamol (ROBAXIN) 500 MG Tab  Active   montelukast (SINGULAIR) 10 MG Tab  Active   nystatin (MYCOSTATIN) powder  Active   ondansetron (ZOFRAN ODT) 4 MG TABLET DISPERSIBLE  Active                    ALLERGIES  Allergies   Allergen Reactions    Penicillin G Rash and Itching     pt reports that she gets a rash all over her body and gets itchy    Amoxicillin Rash and Itching     Tolerates cephalosporins, pt reports that she gets a rash all over her body and gets itchy       PHYSICAL EXAM  VITAL SIGNS: /72   Pulse 77   Temp 36.2 °C (97.1 °F) (Temporal)   Resp 18   Ht 1.575 m (5' 2\")   Wt 122 kg (268 lb)   LMP  (LMP Unknown)   SpO2 92%   BMI 49.02 kg/m²    Constitutional: Awake and alert. No acute distress.  Head: NCAT.  HEENT: Normal Conjunctiva. PERRLA.  No exudates or erythema  Neck: Grossly normal range of motion. Airway midline.  Cardiovascular: Normal heart rate, Normal rhythm.  Thorax & Lungs: No respiratory distress. Clear to Auscultation bilaterally.  Abdomen: Normal inspection. Nontender. Nondistended  Skin: No obvious rash.  Back: No tenderness, No CVA tenderness.   Musculoskeletal: No obvious deformity. Moves all extremities Well.  Neurologic: A&Ox3.   Psychiatric: Mood and affect are appropriate for situation.    LABS  Results for orders placed or performed during the hospital encounter of 03/06/24   POC CoV-2, FLU A/B, RSV by PCR   Result Value Ref Range    " POC Influenza A RNA, PCR Negative Negative    POC Influenza B RNA, PCR Negative Negative    POC RSV, by PCR Negative Negative    POC SARS-CoV-2, PCR NotDetected      *Note: Due to a large number of results and/or encounters for the requested time period, some results have not been displayed. A complete set of results can be found in Results Review.     COURSE & MEDICAL DECISION MAKING    ED Observation Status? No; Patient does not meet criteria for ED Observation.     INITIAL ASSESSMENT, COURSE AND PLAN  Care Narrative:   48-year-old female history of asthma, CKD here for 4 days of sore throat without fevers, shortness of breath.  Afebrile and reassuring vitals  On exam no exudates or erythema, lungs are clear, uvula midline, no airway concerns.  Will treat with dexamethasone for sore throat.  Viral swab negative.  Low suspicion for strep throat.  Patient well-known to the department and recent workup is reassuring.  Advise follow-up with PCP.  Will be discharged to prior living conditions.      ADDITIONAL PROBLEM LIST  None  DISPOSITION AND DISCUSSIONS  I have discussed management of the patient with the following physicians and JAY's:  None    Discussion of management with other QHP or appropriate source(s): None     Escalation of care considered, and ultimately not performed:acute inpatient care management, however at this time, the patient is most appropriate for outpatient management    Barriers to care at this time, including but not limited to: Patient does not have established PCP and Patient lacks financial resources.     Decision tools and prescription drugs considered including, but not limited to: Medication modification for cough, sore throat .    FINAL DIAGNOSIS  1. Sore throat    2. Atrial fibrillation, unspecified type (HCC) Chronic   3. Viral illness Acute          Electronically signed by: Julee Encarnacion D.O., 3/6/2024 9:48 PM

## 2024-03-07 NOTE — ED NOTES
Pt understands DC instructions and prescription pick-up, DC paperwork provided, pt provided wheelchair to [facility] for DC

## 2024-03-07 NOTE — ED TRIAGE NOTES
"48 y.o.  Female    Chief Complaint   Patient presents with    Sore Throat     X 4 days     Flu Like Symptoms       Pt ambulatory to triage. Pt came with above complaint.  Pt reports her sore throat getting severe unable to take -in food.  Pt claimed intermittent vomiting.  Pt with asthma and COPD.  Pt denies fever, CP and abdominal pain.    Pt A&Ox 4, GCS 15.     Pt to wheeled to ER lobby . Pt educated on alerting staff in changes to condition. Pt verbalized understanding. COVID sample obtained.      /72   Pulse 77   Temp 36.2 °C (97.1 °F) (Temporal)   Resp 18   Ht 1.575 m (5' 2\")   Wt 122 kg (268 lb)   LMP  (LMP Unknown)   SpO2 92%   BMI 49.02 kg/m²     "

## 2024-03-11 NOTE — ED NOTES
"Chief Complaint   Patient presents with   • Neck Pain     Pt reports continued neck pain since fall on 7/10. Seen in ER for same.      /69   Pulse 82   Temp 36.4 °C (97.5 °F) (Temporal)   Resp 14   Ht 1.575 m (5' 2\")   Wt (!) 129 kg (284 lb 2.8 oz)   LMP  (LMP Unknown)   SpO2 96%   BMI 51.98 kg/m²     Pt brought into triage by CINDY, ambulated in with home walker, mask in place. NAD, encouraged to return to the triage nurse or tech with any new complaints or symptoms.  " [facility]  [address] [place] [address]  [address]  [facility] [address]  Phone:  [phone]  Fax: [phone]       Chief Complaint   Patient presents with    Follow-Up from Hospital    Cough    Congestion   .      SUBJECTIVE:    Katie Wu is a 71 y.o. female  Dictation on: 03/11/2024  1:47 PM by: AMANDA PRESSLEY [[id number]]          Current Outpatient Medications   Medication Sig Dispense Refill    omeprazole (PRILOSEC) 40 MG delayed release capsule Take 1 capsule by mouth every morning (before breakfast) 90 capsule 1    amLODIPine (NORVASC) 10 MG tablet TAKE 1 TABLET EVERY DAY 90 tablet 3    fluticasone-umeclidin-vilant (TRELEGY ELLIPTA) 100-62.5-25 MCG/ACT AEPB inhaler INHALE 1 PUFF EVERY DAY      amoxicillin-clavulanate (AUGMENTIN) 875-125 MG per tablet Take 1 tablet by mouth 2 times daily for 5 days (Patient not taking: Reported on 3/11/2024) 10 tablet 0     No current facility-administered medications for this visit.     Past Medical History:   Diagnosis Date    COPD (chronic obstructive pulmonary disease) (HCC)     Emphysema of lung (HCC)     Hypertension About  5 yrs.ago     Past Surgical History:   Procedure Laterality Date    BREAST BIOPSY Right     Benign. Patient thinks biopsy was on rt breast lower areolar area. Along time ago.    COLONOSCOPY N/A 5/23/2016    COLONOSCOPY performed by Tim Miranda MD at [facility]    GI      colonscopy - about 2011    GYN      hysterectomy    HYSTERECTOMY (CERVIX STATUS UNKNOWN)      HYSTERECTOMY, TOTAL ABDOMINAL (CERVIX REMOVED)      ORTHOPEDIC SURGERY      surgery for broken foot - right     No Known Allergies      REVIEW OF SYSTEMS:  General: negative for - chills or fever  ENT: negative for - headaches, nasal congestion or tinnitus  Respiratory: negative for - cough, hemoptysis, shortness of breath or wheezing  Cardiovascular : negative for - chest pain, edema, palpitations or shortness of breath  Gastrointestinal: negative for -

## 2024-03-14 ENCOUNTER — HOSPITAL ENCOUNTER (EMERGENCY)
Facility: MEDICAL CENTER | Age: 49
End: 2024-03-15
Attending: EMERGENCY MEDICINE
Payer: MEDICAID

## 2024-03-14 ENCOUNTER — APPOINTMENT (OUTPATIENT)
Dept: RADIOLOGY | Facility: MEDICAL CENTER | Age: 49
End: 2024-03-14
Attending: EMERGENCY MEDICINE
Payer: MEDICAID

## 2024-03-14 DIAGNOSIS — R11.2 NAUSEA AND VOMITING, UNSPECIFIED VOMITING TYPE: ICD-10-CM

## 2024-03-14 DIAGNOSIS — R19.7 DIARRHEA, UNSPECIFIED TYPE: ICD-10-CM

## 2024-03-14 PROCEDURE — 99284 EMERGENCY DEPT VISIT MOD MDM: CPT

## 2024-03-14 PROCEDURE — 80053 COMPREHEN METABOLIC PANEL: CPT

## 2024-03-14 PROCEDURE — 94760 N-INVAS EAR/PLS OXIMETRY 1: CPT

## 2024-03-14 PROCEDURE — 36415 COLL VENOUS BLD VENIPUNCTURE: CPT

## 2024-03-14 PROCEDURE — 85025 COMPLETE CBC W/AUTO DIFF WBC: CPT

## 2024-03-14 PROCEDURE — 83690 ASSAY OF LIPASE: CPT

## 2024-03-14 PROCEDURE — 84484 ASSAY OF TROPONIN QUANT: CPT

## 2024-03-14 PROCEDURE — 93005 ELECTROCARDIOGRAM TRACING: CPT | Performed by: EMERGENCY MEDICINE

## 2024-03-14 RX ORDER — ONDANSETRON 2 MG/ML
4 INJECTION INTRAMUSCULAR; INTRAVENOUS ONCE
Status: DISCONTINUED | OUTPATIENT
Start: 2024-03-15 | End: 2024-03-15 | Stop reason: HOSPADM

## 2024-03-14 RX ORDER — SODIUM CHLORIDE, SODIUM LACTATE, POTASSIUM CHLORIDE, CALCIUM CHLORIDE 600; 310; 30; 20 MG/100ML; MG/100ML; MG/100ML; MG/100ML
1000 INJECTION, SOLUTION INTRAVENOUS ONCE
Status: COMPLETED | OUTPATIENT
Start: 2024-03-15 | End: 2024-03-15

## 2024-03-14 ASSESSMENT — PAIN DESCRIPTION - PAIN TYPE: TYPE: ACUTE PAIN

## 2024-03-14 ASSESSMENT — FIBROSIS 4 INDEX: FIB4 SCORE: 0.61

## 2024-03-15 VITALS
OXYGEN SATURATION: 94 % | HEART RATE: 70 BPM | HEIGHT: 62 IN | RESPIRATION RATE: 17 BRPM | WEIGHT: 268 LBS | BODY MASS INDEX: 49.32 KG/M2 | SYSTOLIC BLOOD PRESSURE: 114 MMHG | DIASTOLIC BLOOD PRESSURE: 58 MMHG | TEMPERATURE: 98.2 F

## 2024-03-15 LAB
ALBUMIN SERPL BCP-MCNC: 3.7 G/DL (ref 3.2–4.9)
ALBUMIN/GLOB SERPL: 1.2 G/DL
ALP SERPL-CCNC: 121 U/L (ref 30–99)
ALT SERPL-CCNC: 19 U/L (ref 2–50)
ANION GAP SERPL CALC-SCNC: 12 MMOL/L (ref 7–16)
AST SERPL-CCNC: 17 U/L (ref 12–45)
BASOPHILS # BLD AUTO: 0.2 % (ref 0–1.8)
BASOPHILS # BLD: 0.02 K/UL (ref 0–0.12)
BILIRUB SERPL-MCNC: 0.5 MG/DL (ref 0.1–1.5)
BUN SERPL-MCNC: 9 MG/DL (ref 8–22)
CALCIUM ALBUM COR SERPL-MCNC: 9.3 MG/DL (ref 8.5–10.5)
CALCIUM SERPL-MCNC: 9.1 MG/DL (ref 8.5–10.5)
CHLORIDE SERPL-SCNC: 105 MMOL/L (ref 96–112)
CO2 SERPL-SCNC: 26 MMOL/L (ref 20–33)
CREAT SERPL-MCNC: 0.89 MG/DL (ref 0.5–1.4)
EKG IMPRESSION: NORMAL
EOSINOPHIL # BLD AUTO: 0.14 K/UL (ref 0–0.51)
EOSINOPHIL NFR BLD: 1.3 % (ref 0–6.9)
ERYTHROCYTE [DISTWIDTH] IN BLOOD BY AUTOMATED COUNT: 51 FL (ref 35.9–50)
FLUAV RNA SPEC QL NAA+PROBE: NEGATIVE
FLUBV RNA SPEC QL NAA+PROBE: NEGATIVE
GFR SERPLBLD CREATININE-BSD FMLA CKD-EPI: 79 ML/MIN/1.73 M 2
GLOBULIN SER CALC-MCNC: 3 G/DL (ref 1.9–3.5)
GLUCOSE SERPL-MCNC: 132 MG/DL (ref 65–99)
HCT VFR BLD AUTO: 39.3 % (ref 37–47)
HGB BLD-MCNC: 12.3 G/DL (ref 12–16)
IMM GRANULOCYTES # BLD AUTO: 0.04 K/UL (ref 0–0.11)
IMM GRANULOCYTES NFR BLD AUTO: 0.4 % (ref 0–0.9)
LIPASE SERPL-CCNC: 27 U/L (ref 11–82)
LYMPHOCYTES # BLD AUTO: 2.32 K/UL (ref 1–4.8)
LYMPHOCYTES NFR BLD: 21.7 % (ref 22–41)
MCH RBC QN AUTO: 27 PG (ref 27–33)
MCHC RBC AUTO-ENTMCNC: 31.3 G/DL (ref 32.2–35.5)
MCV RBC AUTO: 86.2 FL (ref 81.4–97.8)
MONOCYTES # BLD AUTO: 0.52 K/UL (ref 0–0.85)
MONOCYTES NFR BLD AUTO: 4.9 % (ref 0–13.4)
NEUTROPHILS # BLD AUTO: 7.63 K/UL (ref 1.82–7.42)
NEUTROPHILS NFR BLD: 71.5 % (ref 44–72)
NRBC # BLD AUTO: 0 K/UL
NRBC BLD-RTO: 0 /100 WBC (ref 0–0.2)
PLATELET # BLD AUTO: 226 K/UL (ref 164–446)
PMV BLD AUTO: 11.7 FL (ref 9–12.9)
POTASSIUM SERPL-SCNC: 3.5 MMOL/L (ref 3.6–5.5)
PROT SERPL-MCNC: 6.7 G/DL (ref 6–8.2)
RBC # BLD AUTO: 4.56 M/UL (ref 4.2–5.4)
RSV RNA SPEC QL NAA+PROBE: NEGATIVE
SARS-COV-2 RNA RESP QL NAA+PROBE: NOTDETECTED
SODIUM SERPL-SCNC: 143 MMOL/L (ref 135–145)
TROPONIN T SERPL-MCNC: 27 NG/L (ref 6–19)
WBC # BLD AUTO: 10.7 K/UL (ref 4.8–10.8)

## 2024-03-15 PROCEDURE — 0241U HCHG SARS-COV-2 COVID-19 NFCT DS RESP RNA 4 TRGT ED POC: CPT

## 2024-03-15 PROCEDURE — 700105 HCHG RX REV CODE 258: Mod: UD | Performed by: EMERGENCY MEDICINE

## 2024-03-15 PROCEDURE — 71045 X-RAY EXAM CHEST 1 VIEW: CPT

## 2024-03-15 RX ORDER — ONDANSETRON 4 MG/1
4 TABLET, ORALLY DISINTEGRATING ORAL EVERY 6 HOURS PRN
Qty: 10 TABLET | Refills: 0 | Status: SHIPPED | OUTPATIENT
Start: 2024-03-15 | End: 2024-03-25

## 2024-03-15 RX ADMIN — SODIUM CHLORIDE, POTASSIUM CHLORIDE, SODIUM LACTATE AND CALCIUM CHLORIDE 1000 ML: 600; 310; 30; 20 INJECTION, SOLUTION INTRAVENOUS at 00:16

## 2024-03-15 NOTE — ED PROVIDER NOTES
ED Provider Note    CHIEF COMPLAINT  Chief Complaint   Patient presents with    Shortness of Breath     Brought in by remsa from sister's home. C/o SOB x 3 days. Hx of PNA  Smoker.    N/V      X 3 days, 4 mg zofran given by EMS       EXTERNAL RECORDS REVIEWED  Inpatient Notes ER evaluation 2 weeks ago 3/6/2024 for pharyngitis and viral syndrome    HPI/ROS  LIMITATION TO HISTORY   Select: : None  OUTSIDE HISTORIAN(S):  None    Lorene Haro is a 48 y.o. female who presents back to the Emergency Department for primary complaint of nausea and vomiting.  Chronic dyspnea.  This is largely unchanged from baseline.  States that sister also had similar GI upset.  Denies any abdominal pain.  No diarrhea.  No blood in vomitus.    PAST MEDICAL HISTORY   has a past medical history of A-fib ([facility]), MONI (acute kidney injury) ([facility]) (2023), Asthma, Bipolar 2 disorder ([facility]), Chickenpox, Chronic obstructive pulmonary disease ([facility]), Hypertension, On home oxygen therapy, Other psychological stress, Schizophrenic disorder ([facility]), and Yeast infection of the skin (2021).    SURGICAL HISTORY   has a past surgical history that includes hysterectomy laparoscopy; colectomy; cholecystectomy; and knee arthroscopy (Left).    FAMILY HISTORY  Family History   Problem Relation Age of Onset    No Known Problems Mother     Cancer Sister        SOCIAL HISTORY  Social History     Tobacco Use    Smoking status: Every Day     Current packs/day: 0.00     Types: Cigarettes     Last attempt to quit: 10/12/2022     Years since quittin.4    Smokeless tobacco: Never   Vaping Use    Vaping Use: Never used   Substance and Sexual Activity    Alcohol use: Not Currently    Drug use: Never    Sexual activity: Not Currently       CURRENT MEDICATIONS  Home Medications       Reviewed by Mat Ambrosio R.N. (Registered Nurse) on 24 at 2340  Med List Status: Partial     Medication Last Dose Status   acetaminophen (TYLENOL) 500 MG Tab  Active   ADVAIR  "DISKUS 100-50 MCG/ACT AEROSOL POWDER, BREATH ACTIVATED  Active   albuterol 108 (90 Base) MCG/ACT Aero Soln inhalation aerosol  Active   aripiprazole (ABILIFY) 30 MG tablet  Active   atorvastatin (LIPITOR) 20 MG Tab  Active   benzonatate (TESSALON) 100 MG Cap  Active   dicyclomine (BENTYL) 20 MG Tab  Active   fluticasone (FLONASE) 50 MCG/ACT nasal spray  Active   furosemide (LASIX) 20 MG Tab  Active   Lidocaine 4 % Patch  Active   mag hydrox-al hydrox-simeth (MAALOX PLUS ES OR MYLANTA DS) 400-400-40 MG/5ML Suspension  Active   methocarbamol (ROBAXIN) 500 MG Tab  Active   montelukast (SINGULAIR) 10 MG Tab  Active   nystatin (MYCOSTATIN) powder  Active   ondansetron (ZOFRAN ODT) 4 MG TABLET DISPERSIBLE  Active                    ALLERGIES  Allergies   Allergen Reactions    Penicillin G Rash and Itching     pt reports that she gets a rash all over her body and gets itchy    Amoxicillin Rash and Itching     Tolerates cephalosporins, pt reports that she gets a rash all over her body and gets itchy       PHYSICAL EXAM  VITAL SIGNS: /58   Pulse 70   Temp 36.8 °C (98.2 °F)   Resp 17   Ht 1.575 m (5' 2\")   Wt 122 kg (268 lb)   LMP  (LMP Unknown)   SpO2 94%   BMI 49.02 kg/m²      Pulse ox interpretation: I interpret this pulse ox as normal.  Constitutional: Alert in no apparent distress.  HENT: No signs of trauma, Bilateral external ears normal, Nose normal.   Eyes: Pupils are equal and reactive  Neck: Normal range of motion, No tenderness, Supple  Cardiovascular: Regular rate and rhythm, no murmurs.   Thorax & Lungs: Normal breath sounds, No respiratory distress, No wheezing, No chest tenderness.   Abdomen: Bowel sounds normal, Soft, No tenderness, obese  Skin: Warm, Dry, No erythema, No rash.   Musculoskeletal: Good range of motion in all major joints. No tenderness to palpation or major deformities noted.   Neurologic: Alert , Normal motor function, Normal sensory function, No focal deficits noted. "   Psychiatric: Affect normal, Judgment normal, Mood normal.         DIAGNOSTIC STUDIES / PROCEDURES      LABS  Results for orders placed or performed during the hospital encounter of 03/14/24   CBC with Differential   Result Value Ref Range    WBC 10.7 4.8 - 10.8 K/uL    RBC 4.56 4.20 - 5.40 M/uL    Hemoglobin 12.3 12.0 - 16.0 g/dL    Hematocrit 39.3 37.0 - 47.0 %    MCV 86.2 81.4 - 97.8 fL    MCH 27.0 27.0 - 33.0 pg    MCHC 31.3 (L) 32.2 - 35.5 g/dL    RDW 51.0 (H) 35.9 - 50.0 fL    Platelet Count 226 164 - 446 K/uL    MPV 11.7 9.0 - 12.9 fL    Neutrophils-Polys 71.50 44.00 - 72.00 %    Lymphocytes 21.70 (L) 22.00 - 41.00 %    Monocytes 4.90 0.00 - 13.40 %    Eosinophils 1.30 0.00 - 6.90 %    Basophils 0.20 0.00 - 1.80 %    Immature Granulocytes 0.40 0.00 - 0.90 %    Nucleated RBC 0.00 0.00 - 0.20 /100 WBC    Neutrophils (Absolute) 7.63 (H) 1.82 - 7.42 K/uL    Lymphs (Absolute) 2.32 1.00 - 4.80 K/uL    Monos (Absolute) 0.52 0.00 - 0.85 K/uL    Eos (Absolute) 0.14 0.00 - 0.51 K/uL    Baso (Absolute) 0.02 0.00 - 0.12 K/uL    Immature Granulocytes (abs) 0.04 0.00 - 0.11 K/uL    NRBC (Absolute) 0.00 K/uL   Complete Metabolic Panel (CMP)   Result Value Ref Range    Sodium 143 135 - 145 mmol/L    Potassium 3.5 (L) 3.6 - 5.5 mmol/L    Chloride 105 96 - 112 mmol/L    Co2 26 20 - 33 mmol/L    Anion Gap 12.0 7.0 - 16.0    Glucose 132 (H) 65 - 99 mg/dL    Bun 9 8 - 22 mg/dL    Creatinine 0.89 0.50 - 1.40 mg/dL    Calcium 9.1 8.5 - 10.5 mg/dL    Correct Calcium 9.3 8.5 - 10.5 mg/dL    AST(SGOT) 17 12 - 45 U/L    ALT(SGPT) 19 2 - 50 U/L    Alkaline Phosphatase 121 (H) 30 - 99 U/L    Total Bilirubin 0.5 0.1 - 1.5 mg/dL    Albumin 3.7 3.2 - 4.9 g/dL    Total Protein 6.7 6.0 - 8.2 g/dL    Globulin 3.0 1.9 - 3.5 g/dL    A-G Ratio 1.2 g/dL   Troponins NOW   Result Value Ref Range    Troponin T 27 (H) 6 - 19 ng/L   LIPASE   Result Value Ref Range    Lipase 27 11 - 82 U/L   ESTIMATED GFR   Result Value Ref Range    GFR (CKD-EPI) 79  >60 mL/min/1.73 m 2   EKG   Result Value Ref Range    Report       [facility] Emergency Dept.    Test Date:  2024  Pt Name:    ADELINA MILLAN                Department: ER  MRN:        [id number]                      Room:       RD 12  Gender:     Female                       Technician: [id number]  :        1975                   Requested By:ER TRIAGE PROTOCOL  Order #:    [id number]                    Reading MD: Juan Ann    Measurements  Intervals                                Axis  Rate:       76                           P:          29  UT:         131                          QRS:        54  QRSD:       108                          T:          5  QT:         420  QTc:        473    Interpretive Statements  Sinus rhythm  Low voltage, precordial leads  RSR' in V1 or V2, right VCD or RVH  Borderline ST elevation, lateral leads  Compared to ECG 2024 15:41:13  Low QRS voltage now present  Right ventricular hypertrophy now present  RSR' in V1 or V2 now present  ST (T wave) deviation now present  Short UT interval n o longer present  Electronically Signed On 03- 00:53:05 PDT by Juan Ann     POC CoV-2, FLU A/B, RSV by PCR   Result Value Ref Range    POC Influenza A RNA, PCR Negative Negative    POC Influenza B RNA, PCR Negative Negative    POC RSV, by PCR Negative Negative    POC SARS-CoV-2, PCR NotDetected      *Note: Due to a large number of results and/or encounters for the requested time period, some results have not been displayed. A complete set of results can be found in Results Review.         RADIOLOGY  I have independently interpreted the diagnostic imaging associated with this visit and am waiting the final reading from the radiologist.   My preliminary interpretation is as follows: Cardiomegaly with no consolidative process  Radiologist interpretation:   DX-CHEST-PORTABLE (1 VIEW)   Final Result         1.  No acute cardiopulmonary disease.   2.  Cardiomegaly             COURSE & MEDICAL DECISION MAKING    ED Observation Status? No; Patient does not meet criteria for ED Observation.     INITIAL ASSESSMENT, COURSE AND PLAN  Care Narrative: 48-year-old presenting with nausea and vomiting.  Chronic dyspnea.  Will complete IV hydration, electrolyte check and chest x-ray imaging    DISPOSITION AND DISCUSSIONS  I have discussed management of the patient with the following physicians and JAY's: None    Discussion of management with other QHP or appropriate source(s): None     Escalation of care considered, and ultimately not performed:acute inpatient care management, however at this time, the patient is most appropriate for outpatient management    Barriers to care at this time, including but not limited to: Patient does not have established PCP.     Decision tools and prescription drugs considered including, but not limited to: Medication modification antiemetics provided .    48-year-old female presenting with above presentation.  No vomiting while here.  Feeling better after IV fluids.  Tolerating p.o.'s.  Workup largely benign.  Troponin is minimally elevated although chronically.  I have a very low concern for ACS as the inducing event of recurrent nausea.  She will be discharged home with outpatient follow-up as referred.  Also understanding of diet to follow and return precautions if needed.    FINAL DIAGNOSIS  1. Nausea and vomiting, unspecified vomiting type    2. Diarrhea, unspecified type    3.  Chronically elevated troponin       Electronically signed by: Juan Ann M.D., 3/14/2024 11:53 PM

## 2024-03-15 NOTE — ED NOTES
Pt discharged to home. Discharge paperwork provided. Education provided by ERP. Reinforced discharge instructions.  Pt was given follow up instructions and prescriptions.  Pt verbalized understanding of all instructions for discharge.   Patient went out of the ER ambulatory with walker and steady gait., alert and oriented x 4, with all belongings.

## 2024-03-15 NOTE — ED TRIAGE NOTES
Lorene Haro  48 y.o.  female  Chief Complaint   Patient presents with    Shortness of Breath     Brought in by raghu from sister's home. C/o SOB x 3 days. Hx of PNA  Smoker.    N/V      X 3 days, 4 mg zofran given by EMS

## 2024-03-16 NOTE — ED NOTES
Blood collected and sent to lab.  
Medication given per MAR.  
Medication given per MAR. Blood collected and sent to lab.  
PIV removed. Discharge instructions discussed with pt.  
Pt placed on 2L O2 via NC. Pt says this is her baseline.  
Pt sleeping. Equal rise and fall of chest observed.  
Report to JC Branham.  
[facility]

## 2024-03-25 ENCOUNTER — HOSPITAL ENCOUNTER (OUTPATIENT)
Facility: MEDICAL CENTER | Age: 49
End: 2024-03-26
Attending: EMERGENCY MEDICINE | Admitting: HOSPITALIST
Payer: MEDICAID

## 2024-03-25 DIAGNOSIS — S39.012A BACK STRAIN, INITIAL ENCOUNTER: Primary | ICD-10-CM

## 2024-03-25 DIAGNOSIS — R52 INTRACTABLE PAIN: ICD-10-CM

## 2024-03-25 DIAGNOSIS — R53.81 PHYSICAL DECONDITIONING: ICD-10-CM

## 2024-03-25 DIAGNOSIS — J96.11 CHRONIC RESPIRATORY FAILURE WITH HYPOXIA (HCC): ICD-10-CM

## 2024-03-25 DIAGNOSIS — M54.9 INTRACTABLE BACK PAIN: ICD-10-CM

## 2024-03-25 PROBLEM — R11.2 INTRACTABLE NAUSEA AND VOMITING: Status: ACTIVE | Noted: 2024-03-25

## 2024-03-25 PROBLEM — J44.9 COPD (CHRONIC OBSTRUCTIVE PULMONARY DISEASE) (HCC): Status: ACTIVE | Noted: 2022-10-23

## 2024-03-25 LAB
ALBUMIN SERPL BCP-MCNC: 3.7 G/DL (ref 3.2–4.9)
ALBUMIN/GLOB SERPL: 1.3 G/DL
ALP SERPL-CCNC: 125 U/L (ref 30–99)
ALT SERPL-CCNC: 11 U/L (ref 2–50)
ANION GAP SERPL CALC-SCNC: 10 MMOL/L (ref 7–16)
APPEARANCE UR: CLEAR
AST SERPL-CCNC: 10 U/L (ref 12–45)
BASOPHILS # BLD AUTO: 0.2 % (ref 0–1.8)
BASOPHILS # BLD: 0.02 K/UL (ref 0–0.12)
BILIRUB SERPL-MCNC: 0.5 MG/DL (ref 0.1–1.5)
BILIRUB UR QL STRIP.AUTO: NEGATIVE
BUN SERPL-MCNC: 8 MG/DL (ref 8–22)
CALCIUM ALBUM COR SERPL-MCNC: 9.4 MG/DL (ref 8.5–10.5)
CALCIUM SERPL-MCNC: 9.2 MG/DL (ref 8.5–10.5)
CHLORIDE SERPL-SCNC: 106 MMOL/L (ref 96–112)
CO2 SERPL-SCNC: 26 MMOL/L (ref 20–33)
COLOR UR: YELLOW
CREAT SERPL-MCNC: 0.89 MG/DL (ref 0.5–1.4)
EOSINOPHIL # BLD AUTO: 0.12 K/UL (ref 0–0.51)
EOSINOPHIL NFR BLD: 1.2 % (ref 0–6.9)
ERYTHROCYTE [DISTWIDTH] IN BLOOD BY AUTOMATED COUNT: 54.3 FL (ref 35.9–50)
GFR SERPLBLD CREATININE-BSD FMLA CKD-EPI: 79 ML/MIN/1.73 M 2
GLOBULIN SER CALC-MCNC: 2.9 G/DL (ref 1.9–3.5)
GLUCOSE SERPL-MCNC: 113 MG/DL (ref 65–99)
GLUCOSE UR STRIP.AUTO-MCNC: NEGATIVE MG/DL
HCT VFR BLD AUTO: 39.7 % (ref 37–47)
HGB BLD-MCNC: 12.1 G/DL (ref 12–16)
IMM GRANULOCYTES # BLD AUTO: 0.04 K/UL (ref 0–0.11)
IMM GRANULOCYTES NFR BLD AUTO: 0.4 % (ref 0–0.9)
KETONES UR STRIP.AUTO-MCNC: NEGATIVE MG/DL
LEUKOCYTE ESTERASE UR QL STRIP.AUTO: NEGATIVE
LYMPHOCYTES # BLD AUTO: 2.34 K/UL (ref 1–4.8)
LYMPHOCYTES NFR BLD: 23.2 % (ref 22–41)
MCH RBC QN AUTO: 26.8 PG (ref 27–33)
MCHC RBC AUTO-ENTMCNC: 30.5 G/DL (ref 32.2–35.5)
MCV RBC AUTO: 87.8 FL (ref 81.4–97.8)
MICRO URNS: NORMAL
MONOCYTES # BLD AUTO: 0.61 K/UL (ref 0–0.85)
MONOCYTES NFR BLD AUTO: 6 % (ref 0–13.4)
NEUTROPHILS # BLD AUTO: 6.97 K/UL (ref 1.82–7.42)
NEUTROPHILS NFR BLD: 69 % (ref 44–72)
NITRITE UR QL STRIP.AUTO: NEGATIVE
NRBC # BLD AUTO: 0 K/UL
NRBC BLD-RTO: 0 /100 WBC (ref 0–0.2)
PH UR STRIP.AUTO: 6.5 [PH] (ref 5–8)
PLATELET # BLD AUTO: 240 K/UL (ref 164–446)
PMV BLD AUTO: 11.3 FL (ref 9–12.9)
POTASSIUM SERPL-SCNC: 4 MMOL/L (ref 3.6–5.5)
PROT SERPL-MCNC: 6.6 G/DL (ref 6–8.2)
PROT UR QL STRIP: NEGATIVE MG/DL
RBC # BLD AUTO: 4.52 M/UL (ref 4.2–5.4)
RBC UR QL AUTO: NEGATIVE
SODIUM SERPL-SCNC: 142 MMOL/L (ref 135–145)
SP GR UR STRIP.AUTO: 1.01
UROBILINOGEN UR STRIP.AUTO-MCNC: 0.2 MG/DL
WBC # BLD AUTO: 10.1 K/UL (ref 4.8–10.8)

## 2024-03-25 PROCEDURE — G0378 HOSPITAL OBSERVATION PER HR: HCPCS

## 2024-03-25 PROCEDURE — 99285 EMERGENCY DEPT VISIT HI MDM: CPT

## 2024-03-25 PROCEDURE — 80053 COMPREHEN METABOLIC PANEL: CPT

## 2024-03-25 PROCEDURE — A9270 NON-COVERED ITEM OR SERVICE: HCPCS | Mod: UD | Performed by: EMERGENCY MEDICINE

## 2024-03-25 PROCEDURE — 700102 HCHG RX REV CODE 250 W/ 637 OVERRIDE(OP): Mod: UD | Performed by: EMERGENCY MEDICINE

## 2024-03-25 PROCEDURE — 96374 THER/PROPH/DIAG INJ IV PUSH: CPT

## 2024-03-25 PROCEDURE — 81003 URINALYSIS AUTO W/O SCOPE: CPT

## 2024-03-25 PROCEDURE — 97161 PT EVAL LOW COMPLEX 20 MIN: CPT

## 2024-03-25 PROCEDURE — 99223 1ST HOSP IP/OBS HIGH 75: CPT | Performed by: HOSPITALIST

## 2024-03-25 PROCEDURE — 36415 COLL VENOUS BLD VENIPUNCTURE: CPT

## 2024-03-25 PROCEDURE — 700111 HCHG RX REV CODE 636 W/ 250 OVERRIDE (IP): Mod: JZ,UD | Performed by: EMERGENCY MEDICINE

## 2024-03-25 PROCEDURE — 700102 HCHG RX REV CODE 250 W/ 637 OVERRIDE(OP): Mod: UD | Performed by: HOSPITALIST

## 2024-03-25 PROCEDURE — A9270 NON-COVERED ITEM OR SERVICE: HCPCS | Mod: UD | Performed by: HOSPITALIST

## 2024-03-25 PROCEDURE — 96376 TX/PRO/DX INJ SAME DRUG ADON: CPT

## 2024-03-25 PROCEDURE — 85025 COMPLETE CBC W/AUTO DIFF WBC: CPT

## 2024-03-25 PROCEDURE — 97165 OT EVAL LOW COMPLEX 30 MIN: CPT

## 2024-03-25 RX ORDER — PROMETHAZINE HYDROCHLORIDE 25 MG/1
12.5-25 TABLET ORAL EVERY 4 HOURS PRN
Status: DISCONTINUED | OUTPATIENT
Start: 2024-03-25 | End: 2024-03-26 | Stop reason: HOSPADM

## 2024-03-25 RX ORDER — ONDANSETRON 2 MG/ML
4 INJECTION INTRAMUSCULAR; INTRAVENOUS EVERY 4 HOURS PRN
Status: DISCONTINUED | OUTPATIENT
Start: 2024-03-25 | End: 2024-03-26 | Stop reason: HOSPADM

## 2024-03-25 RX ORDER — OXYCODONE HYDROCHLORIDE 5 MG/1
5 TABLET ORAL EVERY 4 HOURS PRN
Status: DISCONTINUED | OUTPATIENT
Start: 2024-03-25 | End: 2024-03-26 | Stop reason: HOSPADM

## 2024-03-25 RX ORDER — KETOROLAC TROMETHAMINE 15 MG/ML
15 INJECTION, SOLUTION INTRAMUSCULAR; INTRAVENOUS EVERY 6 HOURS PRN
Status: DISCONTINUED | OUTPATIENT
Start: 2024-03-25 | End: 2024-03-26 | Stop reason: HOSPADM

## 2024-03-25 RX ORDER — MORPHINE SULFATE 4 MG/ML
4 INJECTION INTRAVENOUS ONCE
Status: COMPLETED | OUTPATIENT
Start: 2024-03-25 | End: 2024-03-25

## 2024-03-25 RX ORDER — ENOXAPARIN SODIUM 100 MG/ML
40 INJECTION SUBCUTANEOUS DAILY
Status: DISCONTINUED | OUTPATIENT
Start: 2024-03-25 | End: 2024-03-25

## 2024-03-25 RX ORDER — ENOXAPARIN SODIUM 100 MG/ML
60 INJECTION SUBCUTANEOUS 2 TIMES DAILY
Status: DISCONTINUED | OUTPATIENT
Start: 2024-03-25 | End: 2024-03-25

## 2024-03-25 RX ORDER — METHOCARBAMOL 500 MG/1
500 TABLET, FILM COATED ORAL 4 TIMES DAILY
Status: DISCONTINUED | OUTPATIENT
Start: 2024-03-25 | End: 2024-03-26 | Stop reason: HOSPADM

## 2024-03-25 RX ORDER — METHYLPREDNISOLONE 4 MG/1
4 TABLET ORAL 2 TIMES DAILY WITH MEALS
Status: DISCONTINUED | OUTPATIENT
Start: 2024-03-29 | End: 2024-03-26 | Stop reason: HOSPADM

## 2024-03-25 RX ORDER — IBUPROFEN 600 MG/1
600 TABLET ORAL 3 TIMES DAILY
Status: DISCONTINUED | OUTPATIENT
Start: 2024-03-25 | End: 2024-03-26 | Stop reason: HOSPADM

## 2024-03-25 RX ORDER — FUROSEMIDE 20 MG/1
20 TABLET ORAL DAILY
Status: DISCONTINUED | OUTPATIENT
Start: 2024-03-25 | End: 2024-03-26 | Stop reason: HOSPADM

## 2024-03-25 RX ORDER — PROMETHAZINE HYDROCHLORIDE 12.5 MG/1
12.5-25 SUPPOSITORY RECTAL EVERY 4 HOURS PRN
Status: DISCONTINUED | OUTPATIENT
Start: 2024-03-25 | End: 2024-03-26 | Stop reason: HOSPADM

## 2024-03-25 RX ORDER — METHYLPREDNISOLONE 4 MG/1
4 TABLET ORAL
Status: DISCONTINUED | OUTPATIENT
Start: 2024-03-28 | End: 2024-03-26 | Stop reason: HOSPADM

## 2024-03-25 RX ORDER — FAMOTIDINE 20 MG/1
20 TABLET, FILM COATED ORAL 2 TIMES DAILY
Status: DISCONTINUED | OUTPATIENT
Start: 2024-03-25 | End: 2024-03-26 | Stop reason: HOSPADM

## 2024-03-25 RX ORDER — ACETAMINOPHEN 325 MG/1
650 TABLET ORAL EVERY 6 HOURS PRN
Status: DISCONTINUED | OUTPATIENT
Start: 2024-03-25 | End: 2024-03-26 | Stop reason: HOSPADM

## 2024-03-25 RX ORDER — ONDANSETRON 4 MG/1
4 TABLET, ORALLY DISINTEGRATING ORAL EVERY 4 HOURS PRN
Status: DISCONTINUED | OUTPATIENT
Start: 2024-03-25 | End: 2024-03-26 | Stop reason: HOSPADM

## 2024-03-25 RX ORDER — OXYCODONE HYDROCHLORIDE 10 MG/1
10 TABLET ORAL EVERY 4 HOURS PRN
Status: DISCONTINUED | OUTPATIENT
Start: 2024-03-25 | End: 2024-03-25

## 2024-03-25 RX ORDER — ATORVASTATIN CALCIUM 20 MG/1
20 TABLET, FILM COATED ORAL
Status: DISCONTINUED | OUTPATIENT
Start: 2024-03-25 | End: 2024-03-26 | Stop reason: HOSPADM

## 2024-03-25 RX ORDER — DICYCLOMINE HCL 20 MG
20 TABLET ORAL EVERY 6 HOURS PRN
Status: DISCONTINUED | OUTPATIENT
Start: 2024-03-25 | End: 2024-03-26 | Stop reason: HOSPADM

## 2024-03-25 RX ORDER — ACETAMINOPHEN 325 MG/1
650 TABLET ORAL EVERY 6 HOURS
Status: DISCONTINUED | OUTPATIENT
Start: 2024-03-25 | End: 2024-03-26 | Stop reason: HOSPADM

## 2024-03-25 RX ORDER — METHYLPREDNISOLONE 4 MG/1
4 TABLET ORAL
Status: DISCONTINUED | OUTPATIENT
Start: 2024-03-26 | End: 2024-03-26 | Stop reason: HOSPADM

## 2024-03-25 RX ORDER — PROCHLORPERAZINE EDISYLATE 5 MG/ML
5-10 INJECTION INTRAMUSCULAR; INTRAVENOUS EVERY 4 HOURS PRN
Status: DISCONTINUED | OUTPATIENT
Start: 2024-03-25 | End: 2024-03-26 | Stop reason: HOSPADM

## 2024-03-25 RX ORDER — ARIPIPRAZOLE 15 MG/1
30 TABLET ORAL EVERY MORNING
Status: DISCONTINUED | OUTPATIENT
Start: 2024-03-25 | End: 2024-03-26 | Stop reason: HOSPADM

## 2024-03-25 RX ORDER — ACETAMINOPHEN 500 MG
1000 TABLET ORAL ONCE
Status: COMPLETED | OUTPATIENT
Start: 2024-03-25 | End: 2024-03-25

## 2024-03-25 RX ORDER — METHOCARBAMOL 750 MG/1
1500 TABLET, FILM COATED ORAL ONCE
Status: COMPLETED | OUTPATIENT
Start: 2024-03-25 | End: 2024-03-25

## 2024-03-25 RX ORDER — METHYLPREDNISOLONE 4 MG/1
8 TABLET ORAL
Status: DISCONTINUED | OUTPATIENT
Start: 2024-03-26 | End: 2024-03-26 | Stop reason: HOSPADM

## 2024-03-25 RX ORDER — METHYLPREDNISOLONE 4 MG/1
4 TABLET ORAL
Status: DISCONTINUED | OUTPATIENT
Start: 2024-03-27 | End: 2024-03-26 | Stop reason: HOSPADM

## 2024-03-25 RX ADMIN — ATORVASTATIN CALCIUM 20 MG: 20 TABLET, FILM COATED ORAL at 21:21

## 2024-03-25 RX ADMIN — METHYLPREDNISOLONE 24 MG: 16 TABLET ORAL at 11:01

## 2024-03-25 RX ADMIN — ACETAMINOPHEN 650 MG: 325 TABLET, FILM COATED ORAL at 17:15

## 2024-03-25 RX ADMIN — METHOCARBAMOL 500 MG: 500 TABLET ORAL at 17:16

## 2024-03-25 RX ADMIN — METHOCARBAMOL TABLETS 1500 MG: 750 TABLET, COATED ORAL at 03:17

## 2024-03-25 RX ADMIN — FAMOTIDINE 20 MG: 20 TABLET, FILM COATED ORAL at 09:53

## 2024-03-25 RX ADMIN — IBUPROFEN 600 MG: 600 TABLET, FILM COATED ORAL at 09:52

## 2024-03-25 RX ADMIN — METHOCARBAMOL 500 MG: 500 TABLET ORAL at 21:21

## 2024-03-25 RX ADMIN — APIXABAN 5 MG: 5 TABLET, FILM COATED ORAL at 17:16

## 2024-03-25 RX ADMIN — FUROSEMIDE 20 MG: 20 TABLET ORAL at 13:11

## 2024-03-25 RX ADMIN — IBUPROFEN 600 MG: 600 TABLET, FILM COATED ORAL at 21:21

## 2024-03-25 RX ADMIN — IBUPROFEN 600 MG: 600 TABLET, FILM COATED ORAL at 13:11

## 2024-03-25 RX ADMIN — METHOCARBAMOL 500 MG: 500 TABLET ORAL at 09:52

## 2024-03-25 RX ADMIN — ARIPIPRAZOLE 30 MG: 15 TABLET ORAL at 13:11

## 2024-03-25 RX ADMIN — MORPHINE SULFATE 4 MG: 4 INJECTION, SOLUTION INTRAMUSCULAR; INTRAVENOUS at 07:59

## 2024-03-25 RX ADMIN — ACETAMINOPHEN 650 MG: 325 TABLET, FILM COATED ORAL at 09:52

## 2024-03-25 RX ADMIN — OXYCODONE 5 MG: 5 TABLET ORAL at 21:29

## 2024-03-25 RX ADMIN — MORPHINE SULFATE 4 MG: 4 INJECTION, SOLUTION INTRAMUSCULAR; INTRAVENOUS at 06:44

## 2024-03-25 RX ADMIN — FAMOTIDINE 20 MG: 20 TABLET, FILM COATED ORAL at 17:16

## 2024-03-25 RX ADMIN — METHOCARBAMOL 500 MG: 500 TABLET ORAL at 13:11

## 2024-03-25 RX ADMIN — ACETAMINOPHEN 1000 MG: 500 TABLET, FILM COATED ORAL at 03:17

## 2024-03-25 ASSESSMENT — FIBROSIS 4 INDEX
FIB4 SCORE: 0.63
FIB4 SCORE: 0.86

## 2024-03-25 ASSESSMENT — COGNITIVE AND FUNCTIONAL STATUS - GENERAL
CLIMB 3 TO 5 STEPS WITH RAILING: A LITTLE
MOBILITY SCORE: 22
DRESSING REGULAR LOWER BODY CLOTHING: A LITTLE
WALKING IN HOSPITAL ROOM: A LITTLE
SUGGESTED CMS G CODE MODIFIER DAILY ACTIVITY: CI
SUGGESTED CMS G CODE MODIFIER MOBILITY: CJ
DAILY ACTIVITIY SCORE: 23

## 2024-03-25 ASSESSMENT — CHA2DS2 SCORE
SEX: FEMALE
VASCULAR DISEASE: NO
CHA2DS2 VASC SCORE: 1
PRIOR STROKE OR TIA OR THROMBOEMBOLISM: NO
CHF OR LEFT VENTRICULAR DYSFUNCTION: NO
HYPERTENSION: NO
AGE 75 OR GREATER: NO
AGE 65 TO 74: NO
DIABETES: NO

## 2024-03-25 ASSESSMENT — PAIN DESCRIPTION - PAIN TYPE
TYPE: ACUTE PAIN

## 2024-03-25 ASSESSMENT — ENCOUNTER SYMPTOMS
BLURRED VISION: 0
PHOTOPHOBIA: 0
CHILLS: 0
SHORTNESS OF BREATH: 0
COUGH: 0
NECK PAIN: 1
HEMOPTYSIS: 0
HEADACHES: 0
CLAUDICATION: 0
SPEECH CHANGE: 0
ORTHOPNEA: 0
HEARTBURN: 0
DOUBLE VISION: 0
MYALGIAS: 1
DIZZINESS: 0
BACK PAIN: 1
DEPRESSION: 0
SENSORY CHANGE: 0
TINGLING: 0
VOMITING: 0
FEVER: 0
WEIGHT LOSS: 0
ABDOMINAL PAIN: 0
NAUSEA: 0

## 2024-03-25 ASSESSMENT — LIFESTYLE VARIABLES
DOES PATIENT WANT TO STOP DRINKING: CANNOT ASSESS
CONSUMPTION TOTAL: NEGATIVE
HOW MANY TIMES IN THE PAST YEAR HAVE YOU HAD 5 OR MORE DRINKS IN A DAY: 0
ON A TYPICAL DAY WHEN YOU DRINK ALCOHOL HOW MANY DRINKS DO YOU HAVE: 0
HAVE YOU EVER FELT YOU SHOULD CUT DOWN ON YOUR DRINKING: NO
TOTAL SCORE: 0
TOTAL SCORE: 0
AVERAGE NUMBER OF DAYS PER WEEK YOU HAVE A DRINK CONTAINING ALCOHOL: 0
ALCOHOL_USE: NO
HAVE PEOPLE ANNOYED YOU BY CRITICIZING YOUR DRINKING: NO
TOTAL SCORE: 0
EVER FELT BAD OR GUILTY ABOUT YOUR DRINKING: NO
EVER HAD A DRINK FIRST THING IN THE MORNING TO STEADY YOUR NERVES TO GET RID OF A HANGOVER: NO

## 2024-03-25 ASSESSMENT — ACTIVITIES OF DAILY LIVING (ADL): TOILETING: INDEPENDENT

## 2024-03-25 ASSESSMENT — GAIT ASSESSMENTS
GAIT LEVEL OF ASSIST: SUPERVISED
ASSISTIVE DEVICE: FRONT WHEEL WALKER
DISTANCE (FEET): 300
DEVIATION: OTHER (COMMENT)

## 2024-03-25 NOTE — THERAPY
Physical Therapy   Initial Evaluation     Patient Name: Lorene Haro  Age:  48 y.o., Sex:  female  Medical Record #: [id number]  Today's Date: 3/25/2024          Assessment  Patient is 48 y.o. female who was recently admitted for near GLF and presented with neck and back pain. Pt was agreeable to therapy evaluation and it was noted that pt has been in for same issue last year around November. Pt presented with near baseline gait mechanics and upright functional mobility. Pt did not require any physical assist from therapist for bed mobility, sit<>stands, transfers, or ambulation. Pt states she has no concerns with navigation of 3 steps into sisters home, however, she is unsure if she will go back to group home with no MELISSA or back with sister. Pt was able to demonstrate functional household ambulation distances with use of FWW for 300 ft and was able to ambulate within her room with slight external support with furniture use w/SPV. Pt is in no acute skilled PT needs at this time, anticipate pt to d/c home once medically clear. Recommend outpatient physical therapy services to address higher level deficits.    Plan    Physical Therapy Initial Treatment Plan   Duration: (P) Evaluation only    DC Equipment Recommendations: (P) None  Discharge Recommendations: (P) Recommend outpatient physical therapy services to address higher level deficits     Objective       03/25/24 1450   Initial Contact Note    Initial Contact Note Order Received and Verified, Evaluation Only - Patient Does Not Require Further Acute Physical Therapy at this Time.  However, May Benefit from Post Acute Therapy for Higher Level Functional Deficits.   Vitals   O2 (LPM) 2   O2 Delivery Device Nasal Cannula   Pain 0 - 10 Group   Therapist Pain Assessment Prior to Activity;During Activity;Post Activity;Nurse Notified  (c/o minimal low back/neck pain, not rated formally)   Prior Living Situation   Prior Services None   Housing / Facility 1 [facility]\""    Steps Into Home 3   Steps In Home 0   Equipment Owned 4-Wheel Walker   Lives with - Patient's Self Care Capacity Attendant / Paid Care Giver;Unrelated Adult   Comments pt states she lives between her group home and sister. Both places have social support PRN   Prior Level of Functional Mobility   Bed Mobility Independent   Transfer Status Independent   Ambulation Independent   Ambulation Distance   (household distances)   Assistive Devices Used None   Stairs Independent   Comments pt states of using 4WW intermittently within home primarily to carry things, however, uses 4WW in community consistently   History of Falls   History of Falls No   Date of Last Fall   (states of recent near GLF, however, she did not fall)   Cognition    Cognition / Consciousness X   Level of Consciousness Alert   Attention Impaired   Comments appears to have a flat affect at times   Passive ROM Lower Body   Passive ROM Lower Body WDL   Active ROM Lower Body    Active ROM Lower Body  WDL   Strength Lower Body   Lower Body Strength  WDL   Sensation Lower Body   Lower Extremity Sensation   WDL   Lower Body Muscle Tone   Lower Body Muscle Tone  WDL   Neurological Concerns   Neurological Concerns No   Coordination Upper Body   Coordination WDL   Coordination Lower Body    Coordination Lower Body  WDL   Balance Assessment   Sitting Balance (Static) Good   Sitting Balance (Dynamic) Good   Standing Balance (Static) Good   Standing Balance (Dynamic) Fair +   Weight Shift Sitting Good   Weight Shift Standing Fair   Comments w/fww, has to use slight external support without FWW use, anticipate pt to do well without use of FWW as well   Bed Mobility    Supine to Sit Supervised   Sit to Supine Supervised   Scooting Supervised   Rolling Supervised   Gait Analysis   Gait Level Of Assist Supervised   Assistive Device Front Wheel Walker   Distance (Feet) 300   # of Times Distance was Traveled 1   Deviation Other (Comment)  (dec marvin and stride length)    Weight Bearing Status fwb   Functional Mobility   Sit to Stand Supervised   Bed, Chair, Wheelchair Transfer Supervised   Transfer Method Stand Step   Mobility w/fww use   6 Clicks Assessment - How much HELP from from another person do you currently need... (If the patient hasn't done an activity recently, how much help from another person do you think he/she would need if he/she tried?)   Turning from your back to your side while in a flat bed without using bedrails? 4   Moving from lying on your back to sitting on the side of a flat bed without using bedrails? 4   Moving to and from a bed to a chair (including a wheelchair)? 4   Standing up from a chair using your arms (e.g., wheelchair, or bedside chair)? 4   Walking in hospital room? 3   Climbing 3-5 steps with a railing? 3   6 clicks Mobility Score 22   Activity Tolerance   Sitting in Chair NT   Sitting Edge of Bed 5 mins   Standing 10 mins   Comments no adverse events noted   Edema / Skin Assessment   Edema / Skin  Not Assessed   Education Group   Education Provided Role of Physical Therapist   Role of Physical Therapist Patient Response Patient;Acceptance;Explanation;Demonstration;Verbal Demonstration;Action Demonstration   Physical Therapy Initial Treatment Plan    Duration Evaluation only   Anticipated Discharge Equipment and Recommendations   DC Equipment Recommendations None   Discharge Recommendations Recommend outpatient physical therapy services to address higher level deficits   Interdisciplinary Plan of Care Collaboration   IDT Collaboration with  Nursing   Patient Position at End of Therapy Seated;Call Light within Reach;Tray Table within Reach;Phone within Reach   Collaboration Comments aware of visit and recs   Session Information   Date / Session Number  3/25 eval only   Priority 0

## 2024-03-25 NOTE — ED PROVIDER NOTES
ER Provider Note    Scribed for Barry Campbell Ii, M.d. by Haether Thorne. 3/25/2024  2:22 AM    Primary Care Provider: Pcp Unknown    CHIEF COMPLAINT  Chief Complaint   Patient presents with    Back Pain     Pt reports almost falling night before last and caught herself, now has pain from neck down her back and L leg      LIMITATION TO HISTORY   Select: : None    HPI/ROS  OUTSIDE HISTORIAN(S):  None        Lorene Haro is a 48 y.o. female who presents to the ED for evaluation of back pain. The patient states she started to fall two nights ago, but caught herself, preventing hitting the ground. She is complaining of pain in her back following the incident. No exacerbating or alleviating factors noted. She states she is taking water pills. The patient states she has Afib and takes Eliquis daily. The patient has a few known allergies.       PAST MEDICAL HISTORY  Past Medical History:   Diagnosis Date    A-fib (HCC)     MONI (acute kidney injury) ([facility]) 8/9/2023    Asthma     Bipolar 2 disorder (HCC)     Chickenpox     Chronic obstructive pulmonary disease (HCC)     Hypertension     On home oxygen therapy     Other psychological stress     Schizophrenic disorder (HCC)     Yeast infection of the skin 04/01/2021       SURGICAL HISTORY  Past Surgical History:   Procedure Laterality Date    CHOLECYSTECTOMY      COLECTOMY      HYSTERECTOMY LAPAROSCOPY      KNEE ARTHROSCOPY Left        FAMILY HISTORY  Family History   Problem Relation Age of Onset    No Known Problems Mother     Cancer Sister        SOCIAL HISTORY   reports that she has been smoking cigarettes. She has never used smokeless tobacco. She reports that she does not currently use alcohol. She reports that she does not use drugs.    CURRENT MEDICATIONS  Previous Medications    ACETAMINOPHEN PO    Take 2 Tablets by mouth 1 time a day as needed for Mild Pain.    ADVAIR DISKUS 100-50 MCG/ACT AEROSOL POWDER, BREATH ACTIVATED    Inhale 1 Puff 2 times a  "day.    ALBUTEROL 108 (90 BASE) MCG/ACT AERO SOLN INHALATION AEROSOL    Inhale 2 Puffs every 6 hours as needed for Shortness of Breath.    APIXABAN (ELIQUIS) 5MG TAB    Take 5 mg by mouth 2 times a day.    ARIPIPRAZOLE (ABILIFY) 30 MG TABLET    Take 1 Tablet by mouth every morning. Indications: Major Depressive Disorder    ATORVASTATIN (LIPITOR) 20 MG TAB    Take 1 Tablet by mouth at bedtime.    DICYCLOMINE (BENTYL) 20 MG TAB    Take 1 Tablet by mouth every 6 hours as needed (abd pain, diarrhea).    FLUTICASONE (FLONASE) 50 MCG/ACT NASAL SPRAY    Administer 2 Sprays into each nostril every morning.    FUROSEMIDE (LASIX) 20 MG TAB    Take 1 Tablet by mouth every day.    ONDANSETRON (ZOFRAN ODT) 4 MG TABLET DISPERSIBLE    Take 1 Tablet by mouth every 6 hours as needed for Nausea/Vomiting.       ALLERGIES  Penicillin g and Amoxicillin    PHYSICAL EXAM  /73   Pulse 73   Temp (!) 35.7 °C (96.2 °F) (Temporal)   Resp 15   Ht 1.575 m (5' 2\")   Wt 122 kg (268 lb 15.4 oz)   LMP  (LMP Unknown)   SpO2 94%   BMI 49.19 kg/m²     Physical Exam  Vitals and nursing note reviewed.   Constitutional:       Appearance: Normal appearance.   Eyes:      Extraocular Movements: Extraocular movements intact.      Pupils: Pupils are equal, round, and reactive to light.   Cardiovascular:      Rate and Rhythm: Normal rate. Rhythm irregular.      Pulses: Normal pulses.      Comments: Equal pulses distally in all extremities  Musculoskeletal:      Cervical back: Normal range of motion.      Comments: Tenderness at left trapezius, left lower back (not midline), left leg but only when actively bending knee. Extremities are warm and well perfused. Moving extremities without any deficits.    Neurological:      Mental Status: She is alert.   Psychiatric:         Mood and Affect: Mood normal.         COURSE & MEDICAL DECISION MAKING      INITIAL ASSESSMENT AND PLAN  Care Narrative:       2:22 AM - Patient seen and evaluated at bedside. " Lorene Haro is a 48 y.o. female with a history of atrial fibrillation, who presents with left neck soreness, lower back soreness, and left knee region pain after she nearly fell. She lost her balance and caught herself from falling to the ground but a couple hours later developed soreness. There is trapezius tenderness, lower back tenderness. No midline spine tenderness. No neurologic deficits. Likely muscle strain. Also possible mild left knee sprain. Will give tylenol and robaxin now, hot pack to trapezius. Low suspicion for fracture or other major injury.     3:05 AM- Patient was reevaluated at bedside. I helped reposition the patient, and she is now receiving medication as ordered.     6:09 AM- The patient is still having pain at this time, will escalate care; morphine medication for pain. Vitals normal.     6:24 PM- Patient was reevaluated at bedside. The patient is stating she is still experiencing back pain. No neuro deficits, pulses equal. No abdominal tenderness. The patient states she is hesitant to move, due to the pain. Clarified history and she states she has had back pain on and off for the past few days, with similar episodes in the past. She denies chest pain. Ordered for IV Morphine and basic labs to look for any increase in inflammatory markers.     8:51 AM  She continues to have pain.  Not getting up out of bed due to pain.  Vitals remained stable.  Labs without significant abnormalities.  Pain seems to be most likely due to musculoskeletal strain.  She overall is very deconditioned, obese and she may need physical therapy to help her out of bed and moving once pain controlled.  I spoke with Dr. Soria, hospitalist the [facility].  He will arrange for hospitalization.    PROBLEM LIST AND DISPOSITION  #Intractable pain    #Obesity, deconditioning                 DISPOSITION AND DISCUSSIONS  I have discussed management of the patient with the following physicians and JAY's: Yang  (hospitalist)      FINAL IMPRESSION   1. Back strain, initial encounter Active   2. Intractable pain    3. Physical deconditioning         I, Heather Thorne (Scribe), am scribing for, and in the presence of, BRINDA Velazquez II.    Electronically signed by: Heather Thorne (Scribe), 3/25/2024    IBarry II, M* personally performed the services described in this documentation, as scribed by Heather Tohrne in my presence, and it is both accurate and complete.    The note accurately reflects work and decisions made by me.  Barry Campbell II, M.D.  3/25/2024  8:53 AM

## 2024-03-25 NOTE — ED NOTES
Med Rec complete per patient   Allergies reviewed  Antibiotics in the past 30 days:no  Anticoagulant in past 14 days:yes  Anticoagulant:Eliquis 5 mg Last dose:03/24/24  Pharmacy patient utilizes:[facility]

## 2024-03-25 NOTE — ASSESSMENT & PLAN NOTE
Multimodal pain management-Tylenol, Motrin, Medrol Dosepak, Lidoderm patch,    P.o. oxycodone as last choice    Ambulate as tolerated    Avoid IV narcotics

## 2024-03-25 NOTE — THERAPY
"Occupational Therapy   Initial Evaluation     Patient Name: Lorene Haro  Age:  48 y.o., Sex:  female  Medical Record #: [id number]  Today's Date: 3/25/2024       Precautions: Fall Risk  Comments: pt reports hx of back & neck pain    Assessment  Patient is 48 y.o. female with a diagnosis of intractable back & neck pain after catching herself from a fall a few nights ago. Pt has past med history of COPD, schizophrenia ,afib, morbifd obesity.  Today pt was pleasant, had minimal c/o back pain.  Pt was SBA for supine to sit EOB.  Pt able to don socks with Min A.  Pt stood with SBA & was able to amb with FWW & SBA on RA & her O2 sats remained above 90%.  Pt is SBA for ADL transfers.  Pt reports that she plans to D/C home to her sisters home & she will have support from her sister & brother-in-law.  Pt stated she lives in a group home but may be transitioning to a different group home soon?  Pt will benefit from HH therapy at D/C.  Patient will not be actively followed for occupational therapy services at this time, however may be seen if requested by physician for 1 more visit within 30 days to address any discharge or equipment needs.     Plan    Occupational Therapy Initial Treatment Plan   Duration: Discharge Needs Only    DC Equipment Recommendations: None  Discharge Recommendations: Recommend home health for continued occupational therapy services (Pt may benefit from Out-pt PT to maximize back safety/strength)     Subjective    \"My legs just give out sometimes\"     Objective      Initial Contact Note    Initial Contact Note Order Received and Verified, Evaluation Only - Patient Does Not Require Further Acute Occupational Therapy at this Time.  However, May Benefit from Post Acute Therapy for Higher Level Functional Deficits.   Prior Living Situation   Prior Services None   Housing / Facility Group Home   Steps Into Home 1   Steps In Home 0   Bathroom Set up Walk In Shower;Shower Chair   Equipment Owned 4-Wheel " Walker;Oxygen  (O2 use at night)   Lives with - Patient's Self Care Capacity Attendant / Paid Care Giver   Comments Pt reports she lives in a group home but she stays with her sister & brother-in-law some days.  Pt also reported she is going to be transitioning to a new place   Prior Level of ADL Function   Self Feeding Independent   Grooming / Hygiene Independent   Bathing Independent   Dressing Independent   Toileting Independent   Prior Level of IADL Function   Medication Management Independent   Laundry Independent   Kitchen Mobility Independent   Finances Requires Assist   Home Management Requires Assist   Shopping Requires Assist   Prior Level Of Mobility Independent With Device in Community   Occupation (Pre-Hospital Vocational) Retired Due To Disability   History of Falls   History of Falls Yes   Precautions   Precautions Fall Risk   Comments pt reports hx of back & neck pain   Vitals   Pulse Oximetry 98 %   Room Air Oximetry 92   O2 (LPM) 2   O2 Delivery Device Silicone Nasal Cannula   Vitals Comments pt able to maintain O2 sats >90% on RA with activity   Pain   Pain Scales 0 to 10 Scale    Intervention Ambulation / Increased Activity   Pain 0 - 10 Group   Location Back   Location Orientation Posterior;Lower   Description Sore   Therapist Pain Assessment During Activity;Nurse Notified;3   Cognition    Cognition / Consciousness WDL   Level of Consciousness Alert   Comments pleasant, co-operative   Passive ROM Upper Body   Passive ROM Upper Body WDL   Active ROM Upper Body   Active ROM Upper Body  WDL   Strength Upper Body   Upper Body Strength  WDL   Sensation Upper Body   Upper Extremity Sensation  WDL   Coordination Upper Body   Coordination WDL   Balance Assessment   Sitting Balance (Static) Good   Sitting Balance (Dynamic) Fair +   Standing Balance (Static) Fair +   Standing Balance (Dynamic) Fair   Weight Shift Sitting Good   Weight Shift Standing Good   Comments with FWW   Bed Mobility    Supine to Sit  Supervised   Sit to Supine Supervised   Scooting Supervised   Rolling Supervised   ADL Assessment   Eating Modified Independent   Grooming Supervision;Standing   Upper Body Dressing Supervision   Lower Body Dressing Standby Assist   Toileting Supervision   Comments pt does have some difficulty with socks due to body habitius & back pain   Functional Mobility   Sit to Stand Supervised   Bed, Chair, Wheelchair Transfer Supervised   Toilet Transfers Supervised   Transfer Method Stand Step   Mobility Pt able to mobilize with FWW on Nsg unit with SBA   Activity Tolerance   Sitting in Chair 35   Sitting Edge of Bed 10   Standing 12   Education Group   Education Provided Home Safety;Activities of Daily Living   Role of Occupational Therapist Patient Response Patient;Acceptance;Explanation;Verbal Demonstration   Home Safety Patient Response Patient;Acceptance;Demonstration;Explanation;Verbal Demonstration   ADL Patient Response Patient;Acceptance;Explanation;Demonstration;Action Demonstration;Verbal Demonstration   Occupational Therapy Initial Treatment Plan    Duration Discharge Needs Only   Anticipated Discharge Equipment and Recommendations   DC Equipment Recommendations None   Discharge Recommendations Recommend home health for continued occupational therapy services  (Pt may benefit from Out-pt PT to maximize back safety/strength)   Interdisciplinary Plan of Care Collaboration   IDT Collaboration with  Nursing;Physician   Patient Position at End of Therapy Seated;Call Light within Reach;Tray Table within Reach;Phone within Reach   Collaboration Comments [facility] notified of OT findings   Session Information   Date / Session Number  3/25 - D/C needs only

## 2024-03-25 NOTE — ED TRIAGE NOTES
"Chief Complaint   Patient presents with    Back Pain     Pt reports almost falling night before last and caught herself, now has pain from neck down her back and L leg      /73   Pulse 73   Temp (!) 35.7 °C (96.2 °F) (Temporal)   Resp 15   Ht 1.575 m (5' 2\")   Wt 122 kg (268 lb 15.4 oz)   LMP  (LMP Unknown)   SpO2 94%   BMI 49.19 kg/m²     Pt here for above cc  Pt reports she almost sustained MGLF from her leg giving out but managed to catch herself night before last. Now has pain from neck down her back, and down L leg  -actual falling, caught herself on the sink  Pt reports taking tylenol PTA but it is not helping   Pt ambulatory steadily to triage    Pt educated on rooming process  "

## 2024-03-25 NOTE — CARE PLAN
Problem: Pain - Standard  Goal: Alleviation of pain or a reduction in pain to the patient’s comfort goal  Outcome: Progressing     Problem: Knowledge Deficit - Standard  Goal: Patient and family/care givers will demonstrate understanding of plan of care, disease process/condition, diagnostic tests and medications  Outcome: Progressing   The patient is Stable - Low risk of patient condition declining or worsening    Shift Goals  Clinical Goals: pain control, Pt eval  Patient Goals: pain control, rest  Family Goals: ARAM    Progress made toward(s) clinical / shift goals:  Pain management, Monitor Lab abs VS. Encourage ambulation as tolerated.     Patient is not progressing towards the following goals:

## 2024-03-25 NOTE — ED NOTES
Pt ambulated to  red 2 from the lobby with a steady gait, changed into gown, monitors on. This RN agrees with triage note. Chart up for ERP.

## 2024-03-25 NOTE — ED NOTES
Pt resting in gurney. Pt states still no relief from the medications. Breathing unlabored. Pt placed on 2 L due to sleep apnea. Bed in the lowest position, side rails up, Call light within reach.

## 2024-03-25 NOTE — DISCHARGE PLANNING
Case Management Discharge Planning    Admission Date: 3/25/2024  GMLOS:    ALOS: 0    6-Clicks ADL Score:    6-Clicks Mobility Score:        Anticipated Discharge Dispo: Discharge Disposition: Discharged to home/self care (01)  Discharge Contact Phone Number: [phone]    DME Needed: No    Action(s) Taken:   RN CM met with patient at bedside.  Pt sitting up in chair.  Pt stated she lives at [facility].  She uses a walker.  She is disabled and has bipolar and schizophrenia.    Pt's PCP is at [facility] in [facility].  She uses O2 2L at night.  Pt is in the process of transitioning to her own apartment, however, it is not ready yet.  Pt plans to dc to her sister's house.  Her niece will drive her there.  Her name is Lavern [phone].    Medically Clear: No    Next Steps:   Pending PT/OT evaluations.    Barriers to Discharge: Pending PT Evaluation, medical clearance.    Care Transition Team Assessment    Information Source  Orientation Level: Oriented X4  Information Given By: Patient  Who is responsible for making decisions for patient? : Patient    Readmission Evaluation  Is this a readmission?: No    Elopement Risk  Legal Hold: No  Ambulatory or Self Mobile in Wheelchair: Yes  Disoriented: No  Psychiatric Symptoms: None  History of Wandering: No  Elopement this Admit: No  Vocalizing Wanting to Leave: No  Displays Behaviors, Body Language Wanting to Leave: No-Not at Risk for Elopement    Interdisciplinary Discharge Planning  Lives with - Patient's Self Care Capacity: Attendant / Paid Care Giver, Unrelated Adult  Patient or legal guardian wants to designate a caregiver: No  Support Systems: Home Care Staff  Housing / Facility: [address] [facility] [place]  Durable Medical Equipment: Not Applicable    Discharge Preparedness  What is your plan after discharge?: Home with help  What are your discharge supports?: Sibling, Other (comment)  Prior Functional Level: Ambulatory, Independent with Activities of  Daily Living, Independent with Medication Management, Uses Walker  Difficulity with ADLs: None  Difficulity with IADLs: Driving    Functional Assesment  Prior Functional Level: Ambulatory, Independent with Activities of Daily Living, Independent with Medication Management, Uses Walker    Finances  Financial Barriers to Discharge: No  Prescription Coverage: Yes    Vision / Hearing Impairment  Vision Impairment : No  Hearing Impairment : No         Advance Directive  Advance Directive?: DPOA for Health Care    Domestic Abuse  Have you ever been the victim of abuse or violence?: No  Physical Abuse or Sexual Abuse: No  Verbal Abuse or Emotional Abuse: No  Possible Abuse/Neglect Reported to:: Not Applicable    Psychological Assessment  History of Psychiatric Problems: Yes  Non-compliant with Treatment: No  Newly Diagnosed Illness: No    Discharge Risks or Barriers  Discharge risks or barriers?: Uninsured / underinsured  Patient risk factors: Multiple ED visits, Mental health, Uninsured or underinsured, Bariatric    Anticipated Discharge Information  Discharge Disposition: Discharged to home/self care (01)  Discharge Contact Phone Number: [phone]

## 2024-03-25 NOTE — ED NOTES
Pt continues to have back pain 9/10. Medicated per MAR.   Asking to eat. ERP aware.   Pt states she wants to stay in the hospital.

## 2024-03-25 NOTE — ED NOTES
Assumed care of pt, bedside report received from Jeannette GREENE.  Introduced self as pt RN.   Call light in reach. Denies needs at this time.  O2: yes, 2L  Cardiac monitoring: yes  Fall risk: yes, interventions in place  SI interventions: no, n/a

## 2024-03-26 ENCOUNTER — PHARMACY VISIT (OUTPATIENT)
Dept: PHARMACY | Facility: MEDICAL CENTER | Age: 49
End: 2024-03-26
Payer: COMMERCIAL

## 2024-03-26 VITALS
RESPIRATION RATE: 20 BRPM | TEMPERATURE: 97.7 F | HEIGHT: 61 IN | OXYGEN SATURATION: 95 % | BODY MASS INDEX: 52.57 KG/M2 | SYSTOLIC BLOOD PRESSURE: 121 MMHG | WEIGHT: 278.44 LBS | HEART RATE: 70 BPM | DIASTOLIC BLOOD PRESSURE: 66 MMHG

## 2024-03-26 PROBLEM — R53.81 DEBILITY: Status: RESOLVED | Noted: 2019-11-18 | Resolved: 2024-03-26

## 2024-03-26 PROCEDURE — 700102 HCHG RX REV CODE 250 W/ 637 OVERRIDE(OP): Mod: UD | Performed by: HOSPITALIST

## 2024-03-26 PROCEDURE — 99239 HOSP IP/OBS DSCHRG MGMT >30: CPT | Mod: FS | Performed by: INTERNAL MEDICINE

## 2024-03-26 PROCEDURE — RXMED WILLOW AMBULATORY MEDICATION CHARGE

## 2024-03-26 PROCEDURE — G0378 HOSPITAL OBSERVATION PER HR: HCPCS

## 2024-03-26 PROCEDURE — A9270 NON-COVERED ITEM OR SERVICE: HCPCS | Mod: UD | Performed by: HOSPITALIST

## 2024-03-26 RX ORDER — KETOROLAC TROMETHAMINE 10 MG/1
10 TABLET, FILM COATED ORAL EVERY 6 HOURS PRN
Qty: 20 TABLET | Refills: 0 | Status: SHIPPED | OUTPATIENT
Start: 2024-03-26 | End: 2024-03-31

## 2024-03-26 RX ORDER — METHYLPREDNISOLONE 4 MG/1
TABLET ORAL
Qty: 21 TABLET | Refills: 0 | Status: SHIPPED | OUTPATIENT
Start: 2024-03-26 | End: 2024-04-14 | Stop reason: SDUPTHER

## 2024-03-26 RX ADMIN — APIXABAN 5 MG: 5 TABLET, FILM COATED ORAL at 06:01

## 2024-03-26 RX ADMIN — FAMOTIDINE 20 MG: 20 TABLET, FILM COATED ORAL at 06:01

## 2024-03-26 RX ADMIN — ACETAMINOPHEN 650 MG: 325 TABLET, FILM COATED ORAL at 07:24

## 2024-03-26 RX ADMIN — IBUPROFEN 600 MG: 600 TABLET, FILM COATED ORAL at 08:44

## 2024-03-26 RX ADMIN — ACETAMINOPHEN 650 MG: 325 TABLET, FILM COATED ORAL at 01:07

## 2024-03-26 RX ADMIN — METHYLPREDNISOLONE 4 MG: 4 TABLET ORAL at 08:44

## 2024-03-26 RX ADMIN — ARIPIPRAZOLE 30 MG: 15 TABLET ORAL at 06:01

## 2024-03-26 RX ADMIN — FUROSEMIDE 20 MG: 20 TABLET ORAL at 06:01

## 2024-03-26 RX ADMIN — METHOCARBAMOL 500 MG: 500 TABLET ORAL at 08:44

## 2024-03-26 ASSESSMENT — PAIN DESCRIPTION - PAIN TYPE
TYPE: ACUTE PAIN
TYPE: CHRONIC PAIN

## 2024-03-26 NOTE — HOSPITAL COURSE
Lorene Haro is a 48 y.o. female with past med history of COPD, schizophrenia ,afib, morbifd obesity, failure who has been to the hospital multiple times for various complaints who presented 3/25/2024 for a near ground-level fall.    She comes into the emergency room today stating that she had a near ground-level fall yesterday and since then, she feels aches in her neck and her lower back.  Patient was given multimodal pain management in the emergency department and was still having complaints of pain and weakness.      In the ED the patient did not exhibit much interest in mobility. The patient states she has Afib and takes Eliquis daily. No fever or chills. No localizing findings on physical exam so no imaging of the spine have been completed at this time.  Vital signs stable.  Chest x-ray showed no acute cardiopulmonary disease or cardiomegaly.    While on the floor the patient was started on Robaxin, ibuprofen, and first dose of methylprednisolone taper.  The patient endorses that she is still experiencing some back pain but it is slightly better.  Educated the patient that this is thought to be more musculoskeletal pain which is why no further imaging was completed.  Educated the patient I will be sending her home with a prednisone taper pack and Toradol as needed for pain management.  Patient is understanding.  All questions answered at this time.  Patient is cleared to discharge and standing.

## 2024-03-26 NOTE — DISCHARGE SUMMARY
Discharge Summary    CHIEF COMPLAINT ON ADMISSION  Chief Complaint   Patient presents with    Back Pain     Pt reports almost falling night before last and caught herself, now has pain from neck down her back and L leg        Reason for Admission  Neck/back/leg pain     Admission Date  3/25/2024    CODE STATUS  Full Code    HPI & HOSPITAL COURSE  Lorene Haro is a 48 y.o. female with past med history of COPD, schizophrenia ,afib, morbifd obesity, failure who has been to the hospital multiple times for various complaints who presented 3/25/2024 for a near ground-level fall.    She comes into the emergency room today stating that she had a near ground-level fall yesterday and since then, she feels aches in her neck and her lower back.  Patient was given multimodal pain management in the emergency department and was still having complaints of pain and weakness.      In the ED the patient did not exhibit much interest in mobility. The patient states she has Afib and takes Eliquis daily. No fever or chills. No localizing findings on physical exam so no imaging of the spine have been completed at this time.  Vital signs stable.  Chest x-ray showed no acute cardiopulmonary disease or cardiomegaly.    While on the floor the patient was started on Robaxin, ibuprofen, and first dose of methylprednisolone taper.  The patient endorses that she is still experiencing some back pain but it is slightly better.  Educated the patient that this is thought to be more musculoskeletal pain which is why no further imaging was completed.  Educated the patient I will be sending her home with a prednisone taper pack and Toradol as needed for pain management.  Patient is understanding.  All questions answered at this time.  Patient is cleared to discharge and standing.      Therefore, she is discharged in good and stable condition to home with close outpatient follow-up.    The patient recovered much more quickly than anticipated on  admission.    Discharge Date  03/26/24    FOLLOW UP ITEMS POST DISCHARGE  Discharge Instructions per CRISTOBAL Rodriguez     -Start taking the methylprednisolone tapering pack as prescribed.  -Toradol as needed has been prescribed for further pain management for 5 days.     DIET: As tolerated     ACTIVITY: Tolerated     DIAGNOSIS: Back pain     Return to ER if he start to experience any numbness and tingling, chest pain, palpitations, or shortness of breath.      DISCHARGE DIAGNOSES  Principal Problem:    Intractable back pain (POA: Yes)  Active Problems:    Morbid obesity (HCC) (POA: Yes)    Schizophrenia (HCC) (POA: Yes)    Paroxysmal atrial fibrillation (HCC) (POA: Yes)    COPD (chronic obstructive pulmonary disease) (HCC) (POA: Yes)    Chronic respiratory failure with hypoxia (HCC) (POA: Yes)  Resolved Problems:    Debility (POA: Yes)      FOLLOW UP  Future Appointments   Date Time Provider Department Center   6/3/2024  2:45 PM PACER CHECK-[facility] None   6/3/2024  3:20 PM Oz Borrego M.D. [facility] None   7/17/2024 11:15 AM [facility] EXAM 4 [facility] None       MEDICATIONS ON DISCHARGE     Medication List        START taking these medications        Instructions   ketorolac 10 MG Tabs  Commonly known as: Toradol   Take 1 Tablet by mouth every 6 hours as needed for Mild Pain or Moderate Pain for up to 5 days.  Dose: 10 mg     methylPREDNISolone 4 MG Tbpk  Commonly known as: Medrol Dosepak   Follow schedule on package instructions.            CONTINUE taking these medications        Instructions   ACETAMINOPHEN PO   Take 2 Tablets by mouth 1 time a day as needed for Mild Pain.  Dose: 2 Tablet     Advair Diskus 100-50 MCG/ACT Aepb  Generic drug: fluticasone-salmeterol   Inhale 1 Puff 2 times a day.  Dose: 1 Puff     albuterol 108 (90 Base) MCG/ACT Aers inhalation aerosol   Inhale 2 Puffs every 6 hours as needed for Shortness of Breath.  Dose: 2 Puff     aripiprazole 30 MG tablet  Commonly known as: Abilify   Take 1  Tablet by mouth every morning. Indications: Major Depressive Disorder  Dose: 30 mg     atorvastatin 20 MG Tabs  Commonly known as: Lipitor   Take 1 Tablet by mouth at bedtime.  Dose: 20 mg     dicyclomine 20 MG Tabs  Commonly known as: Bentyl   Take 1 Tablet by mouth every 6 hours as needed (abd pain, diarrhea).  Dose: 20 mg     Eliquis 5mg Tabs  Generic drug: apixaban   Take 5 mg by mouth 2 times a day.  Dose: 5 mg     fluticasone 50 MCG/ACT nasal spray  Commonly known as: Flonase   Administer 2 Sprays into each nostril every morning.  Dose: 2 Spray     furosemide 20 MG Tabs  Commonly known as: Lasix   Take 1 Tablet by mouth every day.  Dose: 20 mg     ondansetron 4 MG Tbdp  Commonly known as: Zofran ODT   Take 1 Tablet by mouth every 6 hours as needed for Nausea/Vomiting.  Dose: 4 mg              Allergies  Allergies   Allergen Reactions    Penicillin G Rash and Itching     pt reports that she gets a rash all over her body and gets itchy    Amoxicillin Rash and Itching     Tolerates cephalosporins, pt reports that she gets a rash all over her body and gets itchy       DIET  Orders Placed This Encounter   Procedures    Diet Order Diet: Regular     Standing Status:   Standing     Number of Occurrences:   1     Order Specific Question:   Diet:     Answer:   Regular [1]       ACTIVITY  As tolerated.  Weight bearing as tolerated    CONSULTATIONS  None    PROCEDURES  None    LABORATORY  Lab Results   Component Value Date    SODIUM 142 03/25/2024    POTASSIUM 4.0 03/25/2024    CHLORIDE 106 03/25/2024    CO2 26 03/25/2024    GLUCOSE 113 (H) 03/25/2024    BUN 8 03/25/2024    CREATININE 0.89 03/25/2024    CREATININE 0.9 09/11/2008        Lab Results   Component Value Date    WBC 10.1 03/25/2024    HEMOGLOBIN 12.1 03/25/2024    HEMATOCRIT 39.7 03/25/2024    PLATELETCT 240 03/25/2024

## 2024-03-26 NOTE — PROGRESS NOTES
4 Eyes Skin Assessment Completed by JC Landry and JULIEN Douglas-CHRISTIAN.    Head WDL  Ears WDL  Nose WDL  Mouth WDL  Neck WDL  Breast/Chest WDL  Shoulder Blades WDL  Spine WDL  (R) Arm/Elbow/Hand WDL  (L) Arm/Elbow/Hand WDL  Abdomen WDL  Groin WDL  Scrotum/Coccyx/Buttocks WDL  (R) Leg Dryness Redness and Swelling  (L) Leg Dryness Redness and Swelling  (R) Heel/Foot/Toe WDL  (L) Heel/Foot/Toe WDL          Devices In Places Pulse Ox and Nasal Cannula      Interventions In Place Gray Ear Foams and Pillows    Possible Skin Injury No    Pictures Uploaded Into Epic N/A  Wound Consult Placed N/A  RN Wound Prevention Protocol Ordered No

## 2024-03-26 NOTE — PROGRESS NOTES
Received report from day shift nurse. Assumed care of pt. Pt alert and oriented x4. Call light in reach. Bed in lowest position. Care of plan discussed with pt with pt agreeing to care of plan. Communication board updated. All questions answered. Assessment completed.

## 2024-03-26 NOTE — PROGRESS NOTES
Patient off the unit to discharge lounge via WC with care aide. All patient belongings with patient.

## 2024-03-26 NOTE — PROGRESS NOTES
Report received from night shift RN. Patient A&Ox4, in bed resting comfortably. VSS, patient on RA, reports chronic pain in lower lumbar region, medicated per MAR, bed in lowest position and locked, call light in reach. Updated on POC, patient verbalized understanding

## 2024-03-26 NOTE — CARE PLAN
The patient is Stable - Low risk of patient condition declining or worsening    Shift Goals  Clinical Goals: pain control  Patient Goals: pain control  Family Goals: not at the bedside      Problem: Pain - Standard  Goal: Alleviation of pain or a reduction in pain to the patient’s comfort goal  Description: Target End Date:  3/26/24    1.  Document pain using the appropriate pain scale per order or unit policy  2.  Educate and implement non-pharmacologic comfort measures (i.e. relaxation, distraction, massage, cold/heat therapy, etc.)  3.  Pain management medications as ordered  4.  Reassess pain after pain med administration per policy  5.  If opiods administered assess patient's response to pain medication is appropriate per POSS sedation scale  6.  Follow pain management plan developed in collaboration with patient and interdisciplinary team (including palliative care or pain specialists if applicable)  Outcome: Progressing     Problem: Knowledge Deficit - Standard  Goal: Patient and family/care givers will demonstrate understanding of plan of care, disease process/condition, diagnostic tests and medications  Description: Target End Date: 3/26/24    1.  Patient oriented to unit, equipment, visitation policy and means for communicating concern  2.  Complete/review Learning Assessment  3.  Assess knowledge level of disease process/condition, treatment plan, diagnostic tests and medications  4.  Explain disease process/condition, treatment plan, diagnostic tests and medications  Outcome: Progressing

## 2024-03-31 ENCOUNTER — NON-PROVIDER VISIT (OUTPATIENT)
Dept: CARDIOLOGY | Facility: MEDICAL CENTER | Age: 49
End: 2024-03-31
Payer: MEDICAID

## 2024-04-01 LAB
ALBUMIN SERPL BCP-MCNC: 4.2 G/DL (ref 3.2–4.9)
ALBUMIN/GLOB SERPL: 1.6 G/DL
ALP SERPL-CCNC: 122 U/L (ref 30–99)
ALT SERPL-CCNC: 26 U/L (ref 2–50)
ANION GAP SERPL CALC-SCNC: 12 MMOL/L (ref 7–16)
AST SERPL-CCNC: 18 U/L (ref 12–45)
BASOPHILS # BLD AUTO: 0.2 % (ref 0–1.8)
BASOPHILS # BLD: 0.04 K/UL (ref 0–0.12)
BILIRUB SERPL-MCNC: 0.6 MG/DL (ref 0.1–1.5)
BUN SERPL-MCNC: 32 MG/DL (ref 8–22)
CALCIUM ALBUM COR SERPL-MCNC: 9 MG/DL (ref 8.5–10.5)
CALCIUM SERPL-MCNC: 9.2 MG/DL (ref 8.5–10.5)
CHLORIDE SERPL-SCNC: 105 MMOL/L (ref 96–112)
CO2 SERPL-SCNC: 25 MMOL/L (ref 20–33)
CREAT SERPL-MCNC: 1.1 MG/DL (ref 0.5–1.4)
EOSINOPHIL # BLD AUTO: 0.08 K/UL (ref 0–0.51)
EOSINOPHIL NFR BLD: 0.4 % (ref 0–6.9)
ERYTHROCYTE [DISTWIDTH] IN BLOOD BY AUTOMATED COUNT: 54.9 FL (ref 35.9–50)
GFR SERPLBLD CREATININE-BSD FMLA CKD-EPI: 61 ML/MIN/1.73 M 2
GLOBULIN SER CALC-MCNC: 2.7 G/DL (ref 1.9–3.5)
GLUCOSE SERPL-MCNC: 110 MG/DL (ref 65–99)
HCT VFR BLD AUTO: 43.5 % (ref 37–47)
HGB BLD-MCNC: 13.4 G/DL (ref 12–16)
IMM GRANULOCYTES # BLD AUTO: 0.13 K/UL (ref 0–0.11)
IMM GRANULOCYTES NFR BLD AUTO: 0.7 % (ref 0–0.9)
LIPASE SERPL-CCNC: 36 U/L (ref 11–82)
LYMPHOCYTES # BLD AUTO: 3.07 K/UL (ref 1–4.8)
LYMPHOCYTES NFR BLD: 17 % (ref 22–41)
MCH RBC QN AUTO: 27.3 PG (ref 27–33)
MCHC RBC AUTO-ENTMCNC: 30.8 G/DL (ref 32.2–35.5)
MCV RBC AUTO: 88.6 FL (ref 81.4–97.8)
MONOCYTES # BLD AUTO: 1.1 K/UL (ref 0–0.85)
MONOCYTES NFR BLD AUTO: 6.1 % (ref 0–13.4)
NEUTROPHILS # BLD AUTO: 13.63 K/UL (ref 1.82–7.42)
NEUTROPHILS NFR BLD: 75.6 % (ref 44–72)
NRBC # BLD AUTO: 0 K/UL
NRBC BLD-RTO: 0 /100 WBC (ref 0–0.2)
PLATELET # BLD AUTO: 270 K/UL (ref 164–446)
PMV BLD AUTO: 10.9 FL (ref 9–12.9)
POTASSIUM SERPL-SCNC: 3.9 MMOL/L (ref 3.6–5.5)
PROT SERPL-MCNC: 6.9 G/DL (ref 6–8.2)
RBC # BLD AUTO: 4.91 M/UL (ref 4.2–5.4)
SODIUM SERPL-SCNC: 142 MMOL/L (ref 135–145)
WBC # BLD AUTO: 18.1 K/UL (ref 4.8–10.8)

## 2024-04-01 PROCEDURE — 83690 ASSAY OF LIPASE: CPT

## 2024-04-01 PROCEDURE — 80053 COMPREHEN METABOLIC PANEL: CPT

## 2024-04-01 PROCEDURE — 99285 EMERGENCY DEPT VISIT HI MDM: CPT

## 2024-04-01 PROCEDURE — 85025 COMPLETE CBC W/AUTO DIFF WBC: CPT

## 2024-04-01 ASSESSMENT — PAIN DESCRIPTION - PAIN TYPE: TYPE: ACUTE PAIN

## 2024-04-01 ASSESSMENT — FIBROSIS 4 INDEX: FIB4 SCORE: 0.63

## 2024-04-01 NOTE — CARDIAC REMOTE MONITOR - SCAN
Device transmission reviewed. Device demonstrated appropriate function.       Electronically Signed by: Nader Barraza M.D.    4/4/2024  7:54 AM

## 2024-04-02 ENCOUNTER — HOSPITAL ENCOUNTER (EMERGENCY)
Facility: MEDICAL CENTER | Age: 49
End: 2024-04-02
Attending: STUDENT IN AN ORGANIZED HEALTH CARE EDUCATION/TRAINING PROGRAM
Payer: MEDICAID

## 2024-04-02 ENCOUNTER — APPOINTMENT (OUTPATIENT)
Dept: RADIOLOGY | Facility: MEDICAL CENTER | Age: 49
End: 2024-04-02
Attending: STUDENT IN AN ORGANIZED HEALTH CARE EDUCATION/TRAINING PROGRAM
Payer: MEDICAID

## 2024-04-02 ENCOUNTER — TELEPHONE (OUTPATIENT)
Dept: CARDIOLOGY | Facility: MEDICAL CENTER | Age: 49
End: 2024-04-02
Payer: MEDICAID

## 2024-04-02 VITALS
OXYGEN SATURATION: 90 % | HEIGHT: 61 IN | TEMPERATURE: 97.8 F | RESPIRATION RATE: 16 BRPM | HEART RATE: 59 BPM | WEIGHT: 286 LBS | DIASTOLIC BLOOD PRESSURE: 60 MMHG | SYSTOLIC BLOOD PRESSURE: 119 MMHG | BODY MASS INDEX: 54 KG/M2

## 2024-04-02 DIAGNOSIS — N12 PYELONEPHRITIS: ICD-10-CM

## 2024-04-02 LAB
APPEARANCE UR: CLEAR
BACTERIA #/AREA URNS HPF: ABNORMAL /HPF
BILIRUB UR QL STRIP.AUTO: NEGATIVE
COLOR UR: YELLOW
EPI CELLS #/AREA URNS HPF: ABNORMAL /HPF
GLUCOSE UR STRIP.AUTO-MCNC: NEGATIVE MG/DL
HYALINE CASTS #/AREA URNS LPF: ABNORMAL /LPF
KETONES UR STRIP.AUTO-MCNC: NEGATIVE MG/DL
LEUKOCYTE ESTERASE UR QL STRIP.AUTO: ABNORMAL
MICRO URNS: ABNORMAL
NITRITE UR QL STRIP.AUTO: NEGATIVE
PH UR STRIP.AUTO: 6 [PH] (ref 5–8)
PROT UR QL STRIP: NEGATIVE MG/DL
RBC # URNS HPF: ABNORMAL /HPF
RBC UR QL AUTO: NEGATIVE
SP GR UR STRIP.AUTO: 1.02
UROBILINOGEN UR STRIP.AUTO-MCNC: 1 MG/DL
WBC #/AREA URNS HPF: ABNORMAL /HPF

## 2024-04-02 PROCEDURE — 87086 URINE CULTURE/COLONY COUNT: CPT

## 2024-04-02 PROCEDURE — 74177 CT ABD & PELVIS W/CONTRAST: CPT

## 2024-04-02 PROCEDURE — 81001 URINALYSIS AUTO W/SCOPE: CPT

## 2024-04-02 PROCEDURE — 96374 THER/PROPH/DIAG INJ IV PUSH: CPT | Mod: XU

## 2024-04-02 PROCEDURE — 700105 HCHG RX REV CODE 258: Mod: UD | Performed by: STUDENT IN AN ORGANIZED HEALTH CARE EDUCATION/TRAINING PROGRAM

## 2024-04-02 PROCEDURE — 700101 HCHG RX REV CODE 250: Mod: UD | Performed by: STUDENT IN AN ORGANIZED HEALTH CARE EDUCATION/TRAINING PROGRAM

## 2024-04-02 PROCEDURE — 700111 HCHG RX REV CODE 636 W/ 250 OVERRIDE (IP): Mod: UD | Performed by: STUDENT IN AN ORGANIZED HEALTH CARE EDUCATION/TRAINING PROGRAM

## 2024-04-02 PROCEDURE — 96375 TX/PRO/DX INJ NEW DRUG ADDON: CPT

## 2024-04-02 PROCEDURE — 36415 COLL VENOUS BLD VENIPUNCTURE: CPT

## 2024-04-02 PROCEDURE — 700117 HCHG RX CONTRAST REV CODE 255: Mod: UD | Performed by: STUDENT IN AN ORGANIZED HEALTH CARE EDUCATION/TRAINING PROGRAM

## 2024-04-02 RX ORDER — SULFAMETHOXAZOLE AND TRIMETHOPRIM 800; 160 MG/1; MG/1
1 TABLET ORAL 2 TIMES DAILY
Qty: 14 TABLET | Refills: 0 | Status: ACTIVE | OUTPATIENT
Start: 2024-04-02 | End: 2024-04-09

## 2024-04-02 RX ORDER — ONDANSETRON 2 MG/ML
4 INJECTION INTRAMUSCULAR; INTRAVENOUS ONCE
Status: COMPLETED | OUTPATIENT
Start: 2024-04-02 | End: 2024-04-02

## 2024-04-02 RX ORDER — SODIUM CHLORIDE, SODIUM LACTATE, POTASSIUM CHLORIDE, CALCIUM CHLORIDE 600; 310; 30; 20 MG/100ML; MG/100ML; MG/100ML; MG/100ML
1000 INJECTION, SOLUTION INTRAVENOUS ONCE
Status: COMPLETED | OUTPATIENT
Start: 2024-04-02 | End: 2024-04-02

## 2024-04-02 RX ORDER — MORPHINE SULFATE 4 MG/ML
4 INJECTION INTRAVENOUS ONCE
Status: COMPLETED | OUTPATIENT
Start: 2024-04-02 | End: 2024-04-02

## 2024-04-02 RX ADMIN — SODIUM CHLORIDE, POTASSIUM CHLORIDE, SODIUM LACTATE AND CALCIUM CHLORIDE 1000 ML: 600; 310; 30; 20 INJECTION, SOLUTION INTRAVENOUS at 02:45

## 2024-04-02 RX ADMIN — MORPHINE SULFATE 4 MG: 4 INJECTION, SOLUTION INTRAMUSCULAR; INTRAVENOUS at 02:33

## 2024-04-02 RX ADMIN — CEFTRIAXONE SODIUM 2000 MG: 10 INJECTION, POWDER, FOR SOLUTION INTRAVENOUS at 04:57

## 2024-04-02 RX ADMIN — ONDANSETRON 4 MG: 2 INJECTION INTRAMUSCULAR; INTRAVENOUS at 02:33

## 2024-04-02 RX ADMIN — IOHEXOL 100 ML: 350 INJECTION, SOLUTION INTRAVENOUS at 02:54

## 2024-04-02 NOTE — ED PROVIDER NOTES
ED Provider Note    CHIEF COMPLAINT  Chief Complaint   Patient presents with    Blood in Urine     Blood when urinating and wiping, pain to genitals    Denies dysuria, urinary frequency, trauma to area    Abdominal Pain     RLQ cramping with urination        EXTERNAL RECORDS REVIEWED  Inpatient Notes Patient admitted for intractable back pain 2024 an started on a medrol dose mita    HPI/ROS  LIMITATION TO HISTORY   Select: : None  OUTSIDE HISTORIAN(S):  None    Lorene Haro is a 48 y.o. female who presents for evaluation of dysuria and hematuria as well as RLQ pain only when she urinates that started within the past 24 hours. She does not have a uterus and denies that this is vaginal bleeding. She is not sexually active. She denies any fever, chills, chest pain, difficulty breathing, diarrhea, blood in stool. She still has an appendix.    PAST MEDICAL HISTORY   has a past medical history of A-fib ([facility]), MONI (acute kidney injury) ([facility]) (2023), Asthma, Bipolar 2 disorder ([facility]), Chickenpox, Chronic obstructive pulmonary disease ([facility]), Hypertension, On home oxygen therapy, Other psychological stress, Schizophrenic disorder ([facility]), and Yeast infection of the skin (2021).    SURGICAL HISTORY   has a past surgical history that includes hysterectomy laparoscopy; colectomy; cholecystectomy; and knee arthroscopy (Left).    FAMILY HISTORY  Family History   Problem Relation Age of Onset    No Known Problems Mother     Cancer Sister        SOCIAL HISTORY  Social History     Tobacco Use    Smoking status: Some Days     Current packs/day: 0.00     Types: Cigarettes     Last attempt to quit: 10/12/2022     Years since quittin.4    Smokeless tobacco: Never   Vaping Use    Vaping Use: Never used   Substance and Sexual Activity    Alcohol use: Not Currently    Drug use: Never    Sexual activity: Not Currently       CURRENT MEDICATIONS  Home Medications       Reviewed by Monica Barrios R.N. (Registered  "Nurse) on 04/01/24 at 2106  Med List Status: Partial     Medication Last Dose Status   ACETAMINOPHEN PO  Active   ADVAIR DISKUS 100-50 MCG/ACT AEROSOL POWDER, BREATH ACTIVATED  Active   albuterol 108 (90 Base) MCG/ACT Aero Soln inhalation aerosol  Active   apixaban (ELIQUIS) 5mg Tab  Active   aripiprazole (ABILIFY) 30 MG tablet  Active   atorvastatin (LIPITOR) 20 MG Tab  Active   dicyclomine (BENTYL) 20 MG Tab  Active   fluticasone (FLONASE) 50 MCG/ACT nasal spray  Active   furosemide (LASIX) 20 MG Tab  Active   methylPREDNISolone (MEDROL DOSEPAK) 4 MG Tablet Therapy Pack  Active   ondansetron (ZOFRAN ODT) 4 MG TABLET DISPERSIBLE  Active                    ALLERGIES  Allergies   Allergen Reactions    Penicillin G Rash and Itching     pt reports that she gets a rash all over her body and gets itchy    Amoxicillin Rash and Itching     Tolerates cephalosporins, pt reports that she gets a rash all over her body and gets itchy       PHYSICAL EXAM  VITAL SIGNS: BP (!) 157/80   Pulse 75   Temp 36.1 °C (97 °F) (Temporal)   Resp 12   Ht 1.549 m (5' 1\")   Wt (!) 130 kg (286 lb)   LMP  (LMP Unknown)   SpO2 93%   BMI 54.04 kg/m²      Constitutional: Well developed, Well nourished, No acute distress, Non-toxic appearance.   HEENT: Normocephalic, Atraumatic,  external ears normal, pharynx pink,  Mucous  Membranes moist, No rhinorrhea or mucosal edema  Eyes: PERRL, EOMI, Conjunctiva normal, No discharge.   Neck: Normal range of motion, No tenderness, Supple, No stridor.   Cardiovascular: Regular Rate and Rhythm, No murmurs,  rubs, or gallops.   Thorax & Lungs: Lungs clear to auscultation bilaterally, No respiratory distress, No wheezes, rhales or rhonchi, No chest wall tenderness.   Abdomen: Bowel sounds normal, Soft, RLQ TTP,  non distended,  No pulsatile masses., no rebound guarding or peritoneal signs.   Skin: Warm, Dry, excoriations and moist skin underneath pannus, consistent with candidiasis  : External  exam " done with tech, and there is no obvious bleeding noted  Back:  No CVA tenderness,  No spinal tenderness, bony crepitance step offs or instability.   Extremities: Equal, intact distal pulses, No cyanosis, clubbing or edema,  No tenderness.   Musculoskeletal: Good range of motion in all major joints. No tenderness to palpation or major deformities noted.   Neurologic: Alert & oriented No focal deficits noted.  Psychiatric: Affect normal, Judgment normal, Mood normal.      EKG/LABS  Results for orders placed or performed during the hospital encounter of 04/02/24   CBC with Differential   Result Value Ref Range    WBC 18.1 (H) 4.8 - 10.8 K/uL    RBC 4.91 4.20 - 5.40 M/uL    Hemoglobin 13.4 12.0 - 16.0 g/dL    Hematocrit 43.5 37.0 - 47.0 %    MCV 88.6 81.4 - 97.8 fL    MCH 27.3 27.0 - 33.0 pg    MCHC 30.8 (L) 32.2 - 35.5 g/dL    RDW 54.9 (H) 35.9 - 50.0 fL    Platelet Count 270 164 - 446 K/uL    MPV 10.9 9.0 - 12.9 fL    Neutrophils-Polys 75.60 (H) 44.00 - 72.00 %    Lymphocytes 17.00 (L) 22.00 - 41.00 %    Monocytes 6.10 0.00 - 13.40 %    Eosinophils 0.40 0.00 - 6.90 %    Basophils 0.20 0.00 - 1.80 %    Immature Granulocytes 0.70 0.00 - 0.90 %    Nucleated RBC 0.00 0.00 - 0.20 /100 WBC    Neutrophils (Absolute) 13.63 (H) 1.82 - 7.42 K/uL    Lymphs (Absolute) 3.07 1.00 - 4.80 K/uL    Monos (Absolute) 1.10 (H) 0.00 - 0.85 K/uL    Eos (Absolute) 0.08 0.00 - 0.51 K/uL    Baso (Absolute) 0.04 0.00 - 0.12 K/uL    Immature Granulocytes (abs) 0.13 (H) 0.00 - 0.11 K/uL    NRBC (Absolute) 0.00 K/uL   Complete Metabolic Panel   Result Value Ref Range    Sodium 142 135 - 145 mmol/L    Potassium 3.9 3.6 - 5.5 mmol/L    Chloride 105 96 - 112 mmol/L    Co2 25 20 - 33 mmol/L    Anion Gap 12.0 7.0 - 16.0    Glucose 110 (H) 65 - 99 mg/dL    Bun 32 (H) 8 - 22 mg/dL    Creatinine 1.10 0.50 - 1.40 mg/dL    Calcium 9.2 8.5 - 10.5 mg/dL    Correct Calcium 9.0 8.5 - 10.5 mg/dL    AST(SGOT) 18 12 - 45 U/L    ALT(SGPT) 26 2 - 50 U/L    Alkaline  Phosphatase 122 (H) 30 - 99 U/L    Total Bilirubin 0.6 0.1 - 1.5 mg/dL    Albumin 4.2 3.2 - 4.9 g/dL    Total Protein 6.9 6.0 - 8.2 g/dL    Globulin 2.7 1.9 - 3.5 g/dL    A-G Ratio 1.6 g/dL   Lipase   Result Value Ref Range    Lipase 36 11 - 82 U/L   Urinalysis    Specimen: Urine   Result Value Ref Range    Color Yellow     Character Clear     Specific Gravity 1.023 <1.035    Ph 6.0 5.0 - 8.0    Glucose Negative Negative mg/dL    Ketones Negative Negative mg/dL    Protein Negative Negative mg/dL    Bilirubin Negative Negative    Urobilinogen, Urine 1.0 Negative    Nitrite Negative Negative    Leukocyte Esterase Trace (A) Negative    Occult Blood Negative Negative    Micro Urine Req Microscopic    ESTIMATED GFR   Result Value Ref Range    GFR (CKD-EPI) 61 >60 mL/min/1.73 m 2   URINE MICROSCOPIC (W/UA)   Result Value Ref Range    WBC 5-10 (A) /hpf    RBC 0-2 /hpf    Bacteria Few (A) None /hpf    Epithelial Cells Few /hpf    Hyaline Cast 0-2 /lpf     *Note: Due to a large number of results and/or encounters for the requested time period, some results have not been displayed. A complete set of results can be found in Results Review.     I have independently interpreted this EKG    RADIOLOGY  I have independently interpreted the diagnostic imaging associated with this visit and am waiting the final reading from the radiologist.   My preliminary interpretation is as follows: no ureteral stone    Radiologist interpretation:  CT-ABDOMEN-PELVIS WITH   Final Result         1.  Fluid-filled reactive small bowel loops, appearance suggesting component of ileus and/or enteritis   2.  Trace pericardial effusion is seen   3.  Hepatomegaly          COURSE & MEDICAL DECISION MAKING    ASSESSMENT, COURSE AND PLAN  Care Narrative:   This is a 49 yo female who presents for evaluation of dysuria and RLQ abdominal pain. On arrival her vitals signs are stable. She is nontoxic in appearance. Also noting some blood in her urine. External   exam done, no obvious vaginal bleeding and patient has had a hysterectomy therefore uterine pathology ruled out.     Labs obtained. She does have leukocytosis of 18,100 however she is on a medrol dose mita for intractable back pain admission so this may be related to that as well. She is not anemic. Kidney fxn stable. Electrolytes unremarkable. She does have 5-10 WBC in her urine therefore given extension to the right flank, will tx as pyelonephritis. CT shows no ureteral stone and shows no appendicitis.     Patient reevaluated, updated on results and she is feeling much better. She is discharged home and instructed to follow up with PCP regarding small pericardial effusion seen on CT scan and for a reassessment. Patient agreeable to dc plan with no further questions.    3:34 AM Patient reevaluated, states she feels much better            ADDITIONAL PROBLEMS MANAGED  Pericardial effusion - small, no physiology of cardiac tamponade, advised to follow up with PCP    DISPOSITION AND DISCUSSIONS  I have discussed management of the patient with the following physicians and JAY's:    None    Discussion of management with other QHP or appropriate source(s): None     Escalation of care considered, and ultimately not performed:acute inpatient care management, however at this time, the patient is most appropriate for outpatient management    Barriers to care at this time, including but not limited to:  None .     Decision tools and prescription drugs considered including, but not limited to: Antibiotics bactrim .     The patient will return for new or worsening symptoms and is stable at the time of discharge.    The patient is referred to a primary physician for blood pressure management, diabetic screening, and for all other preventative health concerns.        DISPOSITION:  Patient will be discharged home in stable condition.    FOLLOW UP:  Your primary care doctor          [facility], Emergency  Dept  [address] [place] [id number]  [phone]          OUTPATIENT MEDICATIONS:  Discharge Medication List as of 4/2/2024  6:06 AM        START taking these medications    Details   sulfamethoxazole-trimethoprim (BACTRIM DS) 800-160 MG tablet Take 1 Tablet by mouth 2 times a day for 7 days., Disp-14 Tablet, R-0, Normal             FINAL DIAGNOSIS  1. Pyelonephritis           Electronically signed by: Adela Luevano M.D., 4/2/2024 2:23 AM

## 2024-04-02 NOTE — TELEPHONE ENCOUNTER
Remote transmission received via home monitor, full report scanned into Media.    AF Episode  -Episode Onset: 3/29/2024 @ 3:21 AM  -Duration: 1 hour 2 minutes  -Peak V Rate: 188 bpm  -Patient on OAC?: No

## 2024-04-02 NOTE — TELEPHONE ENCOUNTER
S/w patient regarding f/u appt with MT, patient would like an appt. Patient scheduled for 4/4/24 @ 1:15pm with MT.

## 2024-04-02 NOTE — ED TRIAGE NOTES
Chief Complaint   Patient presents with    Blood in Urine     Blood when urinating and wiping, pain to genitals    Denies dysuria, urinary frequency, trauma to area    Abdominal Pain     RLQ cramping with urination      Pt escorted in wheelchair to triage for above complaint.      Pt is alert/oriented and follows commands. Pt speaking in full sentences and responds appropriately to questions. No acute distress noted in triage and respirations are even and unlabored.     Pt placed in hallway and educated on triage process. Pt encouraged to alert staff for any changes in condition.

## 2024-04-02 NOTE — ED NOTES
Pt discharged to home. Pt was given follow up instructions and prescriptions for bactrim. All lines removed prior to discharge. Pt verbalized understanding of all instructions for discharge and is ambulatory out of ED with steady gait. Aox4. Pt assisted w/ calling [facility] for transport back to group home.

## 2024-04-04 ENCOUNTER — OFFICE VISIT (OUTPATIENT)
Dept: CARDIOLOGY | Facility: MEDICAL CENTER | Age: 49
End: 2024-04-04
Attending: NURSE PRACTITIONER
Payer: MEDICAID

## 2024-04-04 VITALS
BODY MASS INDEX: 53.24 KG/M2 | RESPIRATION RATE: 18 BRPM | OXYGEN SATURATION: 92 % | DIASTOLIC BLOOD PRESSURE: 62 MMHG | HEIGHT: 61 IN | WEIGHT: 282 LBS | SYSTOLIC BLOOD PRESSURE: 116 MMHG | HEART RATE: 61 BPM

## 2024-04-04 DIAGNOSIS — I48.0 PAROXYSMAL ATRIAL FIBRILLATION (HCC): ICD-10-CM

## 2024-04-04 LAB
BACTERIA UR CULT: NORMAL
SIGNIFICANT IND 70042: NORMAL
SITE SITE: NORMAL
SOURCE SOURCE: NORMAL

## 2024-04-04 PROCEDURE — 99213 OFFICE O/P EST LOW 20 MIN: CPT | Performed by: NURSE PRACTITIONER

## 2024-04-04 PROCEDURE — 93298 REM INTERROG DEV EVAL SCRMS: CPT | Mod: 26 | Performed by: INTERNAL MEDICINE

## 2024-04-04 PROCEDURE — 3078F DIAST BP <80 MM HG: CPT | Performed by: NURSE PRACTITIONER

## 2024-04-04 PROCEDURE — 93005 ELECTROCARDIOGRAM TRACING: CPT | Performed by: NURSE PRACTITIONER

## 2024-04-04 PROCEDURE — 3074F SYST BP LT 130 MM HG: CPT | Performed by: NURSE PRACTITIONER

## 2024-04-04 ASSESSMENT — ENCOUNTER SYMPTOMS
PND: 0
EYES NEGATIVE: 1
NEUROLOGICAL NEGATIVE: 1
DIZZINESS: 0
MUSCULOSKELETAL NEGATIVE: 1
DEPRESSION: 0
ORTHOPNEA: 0
NERVOUS/ANXIOUS: 0
HALLUCINATIONS: 0
WHEEZING: 0
BRUISES/BLEEDS EASILY: 0
PALPITATIONS: 0
GASTROINTESTINAL NEGATIVE: 1
CLAUDICATION: 0
RESPIRATORY NEGATIVE: 1
NAUSEA: 0
SHORTNESS OF BREATH: 0
SENSORY CHANGE: 0
CONSTITUTIONAL NEGATIVE: 1

## 2024-04-04 ASSESSMENT — FIBROSIS 4 INDEX: FIB4 SCORE: 0.65

## 2024-04-04 NOTE — PROGRESS NOTES
Atrial Fibrillation Follow up Note    DOS: 2024   [id number]  Lorene Haro    Chief complaint/Reason for consult: recent episode and ILR check    HPI: Pt is a 48 y.o. female who presents to the clinic today in follow up for solitary AF episode. Patient has a past medical history significant for but not limited to: reactive airway disease, KATHIA, paroxysmal atrial fibrillation, loop recorder in situ, COPD. Patient presents today for solitary 1 hr episode recently. Overall burden still < 0.1%. Patient and I had long discussion and despite low CHADS score she would feel better to just take the Eliquis daily because of her mo dying of a stroke. This is totally fine and will also help the patient family to feel calmer. We will continue to watch for increasing burden and adjust treatment strategy accordingly.     Past Medical History:   Diagnosis Date    A-fib (HCC)     MONI (acute kidney injury) (HCC) 2023    Asthma     Bipolar 2 disorder (HCC)     Chickenpox     Chronic obstructive pulmonary disease (HCC)     Hypertension     On home oxygen therapy     Other psychological stress     Schizophrenic disorder (HCC)     Yeast infection of the skin 2021       Past Surgical History:   Procedure Laterality Date    CHOLECYSTECTOMY      COLECTOMY      HYSTERECTOMY LAPAROSCOPY      KNEE ARTHROSCOPY Left        Social History     Socioeconomic History    Marital status: Single     Spouse name: Not on file    Number of children: Not on file    Years of education: Not on file    Highest education level: Not on file   Occupational History    Not on file   Tobacco Use    Smoking status: Some Days     Current packs/day: 0.00     Types: Cigarettes     Last attempt to quit: 10/12/2022     Years since quittin.4    Smokeless tobacco: Never   Vaping Use    Vaping Use: Never used   Substance and Sexual Activity    Alcohol use: Not Currently    Drug use: Never    Sexual activity: Not Currently   Other Topics Concern    Not on  file   Social History Narrative    ** Merged History Encounter **         From [facility]     Financial Resource Strain: Medium Risk (5/4/2022)    Overall Financial Resource Strain (CARDIA)     Difficulty of Paying Living Expenses: Somewhat hard   Food Insecurity: No Food Insecurity (8/3/2023)    Hunger Vital Sign     Worried About Running Out of Food in the Last Year: Never true     Ran Out of Food in the Last Year: Never true   Transportation Needs: No Transportation Needs (5/4/2022)    PRAPARE - Transportation     Lack of Transportation (Medical): No     Lack of Transportation (Non-Medical): No   Physical Activity: Not on file   Stress: Not on file   Social Connections: Not on file   Intimate Partner Violence: Not on file   Housing Stability: Not on file       Family History   Problem Relation Age of Onset    No Known Problems Mother     Cancer Sister        Allergies   Allergen Reactions    Penicillin G Rash and Itching     pt reports that she gets a rash all over her body and gets itchy    Amoxicillin Rash and Itching     Tolerates cephalosporins, pt reports that she gets a rash all over her body and gets itchy       Current Outpatient Medications   Medication Sig Dispense Refill    apixaban (ELIQUIS) 5mg Tab Take 1 Tablet by mouth 2 times a day. 200 Tablet 3    sulfamethoxazole-trimethoprim (BACTRIM DS) 800-160 MG tablet Take 1 Tablet by mouth 2 times a day for 7 days. 14 Tablet 0    methylPREDNISolone (MEDROL DOSEPAK) 4 MG Tablet Therapy Pack Follow schedule on package instructions. 21 Tablet 0    ACETAMINOPHEN PO Take 2 Tablets by mouth 1 time a day as needed for Mild Pain.      ondansetron (ZOFRAN ODT) 4 MG TABLET DISPERSIBLE Take 1 Tablet by mouth every 6 hours as needed for Nausea/Vomiting. 10 Tablet 0    dicyclomine (BENTYL) 20 MG Tab Take 1 Tablet by mouth every 6 hours as needed (abd pain, diarrhea). 20 Tablet 0    albuterol 108 (90 Base) MCG/ACT Aero Soln inhalation  aerosol Inhale 2 Puffs every 6 hours as needed for Shortness of Breath. 8.5 g 0    fluticasone (FLONASE) 50 MCG/ACT nasal spray Administer 2 Sprays into each nostril every morning.      ADVAIR DISKUS 100-50 MCG/ACT AEROSOL POWDER, BREATH ACTIVATED Inhale 1 Puff 2 times a day.      furosemide (LASIX) 20 MG Tab Take 1 Tablet by mouth every day. 90 Tablet 0    aripiprazole (ABILIFY) 30 MG tablet Take 1 Tablet by mouth every morning. Indications: Major Depressive Disorder 30 Tablet 0    atorvastatin (LIPITOR) 20 MG Tab Take 1 Tablet by mouth at bedtime. 30 Tablet 0     No current facility-administered medications for this visit.       Vitals:    04/04/24 1240   BP: 116/62   Pulse: 61   Resp: 18   SpO2: 92%         Review of Systems   Constitutional: Negative.  Negative for malaise/fatigue.   HENT: Negative.     Eyes: Negative.    Respiratory: Negative.  Negative for shortness of breath and wheezing.    Cardiovascular:  Negative for chest pain, palpitations, orthopnea, claudication, leg swelling and PND.   Gastrointestinal: Negative.  Negative for nausea.   Genitourinary: Negative.    Musculoskeletal: Negative.    Skin: Negative.    Neurological: Negative.  Negative for dizziness and sensory change.   Endo/Heme/Allergies: Negative.  Does not bruise/bleed easily.   Psychiatric/Behavioral:  Negative for depression and hallucinations. The patient is not nervous/anxious.           Device Type: [employer] ILR  Battery Longevity: good  Lead Impedance: stable and within normal limits  Indication: palpitations  Events: AF solitary episode 1 hours    Device interrogated and programmed by Pola Brown       EKG interpreted by me: Sinus    Physical Exam  Constitutional:       Appearance: Normal appearance.   HENT:      Head: Normocephalic.   Eyes:      Pupils: Pupils are equal, round, and reactive to light.   Neck:      Vascular: No JVD.   Cardiovascular:      Rate and Rhythm: Normal rate and regular rhythm.      Pulses:  "Normal pulses.      Heart sounds: Normal heart sounds.   Pulmonary:      Effort: Pulmonary effort is normal.      Breath sounds: Normal breath sounds.   Abdominal:      General: Abdomen is flat.      Palpations: Abdomen is soft.   Musculoskeletal:      Cervical back: Normal range of motion.      Right lower leg: No edema.      Left lower leg: No edema.   Skin:     General: Skin is warm and dry.   Neurological:      Mental Status: She is alert and oriented to person, place, and time.   Psychiatric:         Mood and Affect: Mood normal.         Behavior: Behavior normal.          Data:  Lipids:   Lab Results   Component Value Date/Time    CHOLSTRLTOT 130 11/01/2023 01:26 PM    TRIGLYCERIDE 144 11/01/2023 01:26 PM    HDL 44 11/01/2023 01:26 PM    LDL 57 11/01/2023 01:26 PM        BMP:  Lab Results   Component Value Date/Time    SODIUM 143 02/06/2024 1154    POTASSIUM 4.1 02/06/2024 1154    CHLORIDE 105 02/06/2024 1154    CO2 27 02/06/2024 1154    GLUCOSE 113 (H) 02/06/2024 1154    BUN 12 02/06/2024 1154    CREATININE 1.06 02/06/2024 1154    CALCIUM 9.5 02/06/2024 1154    ANION 11.0 02/06/2024 1154       GFR:  Lab Results   Component Value Date/Time    IFAFRICA >60 02/28/2022 0555    IFNOTAFR >60 02/28/2022 0555        TSH:   Lab Results   Component Value Date/Time    TSHULTRASEN 0.720 11/01/2023 1326       MAGNESIUM:  Lab Results   Component Value Date/Time    MAGNESIUM 2.3 11/05/2023 0129    MAGNESIUM 2.2 08/01/2023 0937    MAGNESIUM 2.2 11/09/2022 0839        THYROXINE (T4):   No results found for: \"FREEDIR\"     CBC:   Lab Results   Component Value Date/Time    WBC 18.1 (H) 04/01/2024 09:47 PM    RBC 4.91 04/01/2024 09:47 PM    HEMOGLOBIN 13.4 04/01/2024 09:47 PM    HEMATOCRIT 43.5 04/01/2024 09:47 PM    MCV 88.6 04/01/2024 09:47 PM    MCH 27.3 04/01/2024 09:47 PM    MCHC 30.8 (L) 04/01/2024 09:47 PM    RDW 54.9 (H) 04/01/2024 09:47 PM    PLATELETCT 270 04/01/2024 09:47 PM    MPV 10.9 04/01/2024 09:47 PM    " NEUTSPOLYS 75.60 (H) 04/01/2024 09:47 PM    LYMPHOCYTES 17.00 (L) 04/01/2024 09:47 PM    MONOCYTES 6.10 04/01/2024 09:47 PM    EOSINOPHILS 0.40 04/01/2024 09:47 PM    BASOPHILS 0.20 04/01/2024 09:47 PM    IMMGRAN 0.70 04/01/2024 09:47 PM    NRBC 0.00 04/01/2024 09:47 PM    NEUTS 13.63 (H) 04/01/2024 09:47 PM    LYMPHS 3.07 04/01/2024 09:47 PM    MONOS 1.10 (H) 04/01/2024 09:47 PM    EOS 0.08 04/01/2024 09:47 PM    BASO 0.04 04/01/2024 09:47 PM    IMMGRANAB 0.13 (H) 04/01/2024 09:47 PM    NRBCAB 0.00 04/01/2024 09:47 PM        CBC w/o DIFF  Lab Results   Component Value Date/Time    WBC 18.1 (H) 04/01/2024 09:47 PM    RBC 4.91 04/01/2024 09:47 PM    HEMOGLOBIN 13.4 04/01/2024 09:47 PM    MCV 88.6 04/01/2024 09:47 PM    MCH 27.3 04/01/2024 09:47 PM    MCHC 30.8 (L) 04/01/2024 09:47 PM    RDW 54.9 (H) 04/01/2024 09:47 PM    MPV 10.9 04/01/2024 09:47 PM       LIVER:  Lab Results   Component Value Date/Time    ALKPHOSPHAT 122 (H) 04/01/2024 09:47 PM    ASTSGOT 18 04/01/2024 09:47 PM    ALTSGPT 26 04/01/2024 09:47 PM    TBILIRUBIN 0.6 04/01/2024 09:47 PM       BNP:  Lab Results   Component Value Date/Time    BNPBTYPENAT 45 11/13/2018 07:14 PM       PT/INR:  Lab Results   Component Value Date/Time    PROTHROMBTM 14.0 01/03/2024 03:12 PM    PROTHROMBTM 13.9 11/03/2023 09:50 PM    PROTHROMBTM 14.0 09/10/2022 04:08 AM    INR 1.07 01/03/2024 03:12 PM    INR 1.05 11/03/2023 09:50 PM    INR 1.09 09/10/2022 04:08 AM             Impression/Plan:  Paroxysmal atrial fibrillation (HCC)   - Eliquis 5mg twice daily for stroke protection   - continue monitor via ILR    - in past few months 2 episodes of 1-2 hrs   - low overall burden <0.1%         A total of 21 minutes of time was spent on day of encounter reviewing medical record, performing history and examination, counseling, ordering medication/test/consults, collaborating with referring service, and documentation.    Pola Brown AGACNP-EP  Cardiac Electrophysiology

## 2024-04-04 NOTE — ASSESSMENT & PLAN NOTE
- Eliquis 5mg twice daily for stroke protection   - continue monitor via ILR    - in past few months 2 episodes of 1-2 hrs   - low overall burden <0.1%

## 2024-04-07 ENCOUNTER — APPOINTMENT (OUTPATIENT)
Dept: RADIOLOGY | Facility: MEDICAL CENTER | Age: 49
End: 2024-04-07
Attending: EMERGENCY MEDICINE
Payer: MEDICAID

## 2024-04-07 ENCOUNTER — HOSPITAL ENCOUNTER (EMERGENCY)
Facility: MEDICAL CENTER | Age: 49
End: 2024-04-07
Attending: EMERGENCY MEDICINE
Payer: MEDICAID

## 2024-04-07 VITALS
BODY MASS INDEX: 51.89 KG/M2 | OXYGEN SATURATION: 94 % | SYSTOLIC BLOOD PRESSURE: 128 MMHG | TEMPERATURE: 97.9 F | DIASTOLIC BLOOD PRESSURE: 64 MMHG | HEIGHT: 62 IN | RESPIRATION RATE: 18 BRPM | WEIGHT: 282 LBS | HEART RATE: 79 BPM

## 2024-04-07 DIAGNOSIS — Z72.0 TOBACCO USE: ICD-10-CM

## 2024-04-07 DIAGNOSIS — R06.02 SHORTNESS OF BREATH: ICD-10-CM

## 2024-04-07 LAB
ALBUMIN SERPL BCP-MCNC: 3.7 G/DL (ref 3.2–4.9)
ALBUMIN/GLOB SERPL: 1.2 G/DL
ALP SERPL-CCNC: 123 U/L (ref 30–99)
ALT SERPL-CCNC: 14 U/L (ref 2–50)
ANION GAP SERPL CALC-SCNC: 12 MMOL/L (ref 7–16)
AST SERPL-CCNC: 12 U/L (ref 12–45)
BASOPHILS # BLD AUTO: 0.1 % (ref 0–1.8)
BASOPHILS # BLD: 0.01 K/UL (ref 0–0.12)
BILIRUB SERPL-MCNC: 0.8 MG/DL (ref 0.1–1.5)
BUN SERPL-MCNC: 10 MG/DL (ref 8–22)
CALCIUM ALBUM COR SERPL-MCNC: 9.2 MG/DL (ref 8.5–10.5)
CALCIUM SERPL-MCNC: 9 MG/DL (ref 8.5–10.5)
CHLORIDE SERPL-SCNC: 103 MMOL/L (ref 96–112)
CO2 SERPL-SCNC: 24 MMOL/L (ref 20–33)
CREAT SERPL-MCNC: 1.01 MG/DL (ref 0.5–1.4)
EKG IMPRESSION: NORMAL
EOSINOPHIL # BLD AUTO: 0.16 K/UL (ref 0–0.51)
EOSINOPHIL NFR BLD: 1.2 % (ref 0–6.9)
ERYTHROCYTE [DISTWIDTH] IN BLOOD BY AUTOMATED COUNT: 55.9 FL (ref 35.9–50)
GFR SERPLBLD CREATININE-BSD FMLA CKD-EPI: 68 ML/MIN/1.73 M 2
GLOBULIN SER CALC-MCNC: 3.2 G/DL (ref 1.9–3.5)
GLUCOSE SERPL-MCNC: 153 MG/DL (ref 65–99)
HCT VFR BLD AUTO: 40.8 % (ref 37–47)
HGB BLD-MCNC: 12.9 G/DL (ref 12–16)
IMM GRANULOCYTES # BLD AUTO: 0.07 K/UL (ref 0–0.11)
IMM GRANULOCYTES NFR BLD AUTO: 0.5 % (ref 0–0.9)
LYMPHOCYTES # BLD AUTO: 2.35 K/UL (ref 1–4.8)
LYMPHOCYTES NFR BLD: 17.6 % (ref 22–41)
MCH RBC QN AUTO: 27.6 PG (ref 27–33)
MCHC RBC AUTO-ENTMCNC: 31.6 G/DL (ref 32.2–35.5)
MCV RBC AUTO: 87.2 FL (ref 81.4–97.8)
MONOCYTES # BLD AUTO: 0.87 K/UL (ref 0–0.85)
MONOCYTES NFR BLD AUTO: 6.5 % (ref 0–13.4)
NEUTROPHILS # BLD AUTO: 9.89 K/UL (ref 1.82–7.42)
NEUTROPHILS NFR BLD: 74.1 % (ref 44–72)
NRBC # BLD AUTO: 0 K/UL
NRBC BLD-RTO: 0 /100 WBC (ref 0–0.2)
NT-PROBNP SERPL IA-MCNC: 162 PG/ML (ref 0–125)
PLATELET # BLD AUTO: 255 K/UL (ref 164–446)
PMV BLD AUTO: 11.4 FL (ref 9–12.9)
POTASSIUM SERPL-SCNC: 4.1 MMOL/L (ref 3.6–5.5)
PROT SERPL-MCNC: 6.9 G/DL (ref 6–8.2)
RBC # BLD AUTO: 4.68 M/UL (ref 4.2–5.4)
SODIUM SERPL-SCNC: 139 MMOL/L (ref 135–145)
TROPONIN T SERPL-MCNC: 32 NG/L (ref 6–19)
WBC # BLD AUTO: 13.4 K/UL (ref 4.8–10.8)

## 2024-04-07 PROCEDURE — 80053 COMPREHEN METABOLIC PANEL: CPT

## 2024-04-07 PROCEDURE — 36415 COLL VENOUS BLD VENIPUNCTURE: CPT

## 2024-04-07 PROCEDURE — 71275 CT ANGIOGRAPHY CHEST: CPT

## 2024-04-07 PROCEDURE — 83880 ASSAY OF NATRIURETIC PEPTIDE: CPT

## 2024-04-07 PROCEDURE — 71045 X-RAY EXAM CHEST 1 VIEW: CPT

## 2024-04-07 PROCEDURE — 94640 AIRWAY INHALATION TREATMENT: CPT

## 2024-04-07 PROCEDURE — 700101 HCHG RX REV CODE 250: Mod: UD | Performed by: EMERGENCY MEDICINE

## 2024-04-07 PROCEDURE — 85025 COMPLETE CBC W/AUTO DIFF WBC: CPT

## 2024-04-07 PROCEDURE — 700117 HCHG RX CONTRAST REV CODE 255: Performed by: EMERGENCY MEDICINE

## 2024-04-07 PROCEDURE — 84484 ASSAY OF TROPONIN QUANT: CPT

## 2024-04-07 PROCEDURE — 93005 ELECTROCARDIOGRAM TRACING: CPT | Performed by: EMERGENCY MEDICINE

## 2024-04-07 PROCEDURE — 99284 EMERGENCY DEPT VISIT MOD MDM: CPT

## 2024-04-07 RX ADMIN — ALBUTEROL SULFATE 2.5 MG: 2.5 SOLUTION RESPIRATORY (INHALATION) at 00:34

## 2024-04-07 RX ADMIN — IOHEXOL 65 ML: 350 INJECTION, SOLUTION INTRAVENOUS at 02:15

## 2024-04-07 ASSESSMENT — FIBROSIS 4 INDEX: FIB4 SCORE: 0.65

## 2024-04-07 ASSESSMENT — PAIN DESCRIPTION - PAIN TYPE: TYPE: ACUTE PAIN

## 2024-04-07 NOTE — ED NOTES
Discharge instructions reviewed with patient and signed. IV was removed from arm.  They verbalized understanding of follow up instructions. All belongings with patient. They ambulate with a steady gait

## 2024-04-07 NOTE — ED TRIAGE NOTES
"Chief Complaint   Patient presents with    Shortness of Breath     BIBA from home for SOB x1.5 hrs. Pt reports PMH COPD, a.fib, sleep apnea. Wears 2lpm O2 at home. Endorses smoking today.      Chest Pain     Mid sternal CP radiating to bilat upper arms and upper back. Pt reports onset of CP x 1.5 hrs ago.        Pt arrives with implanted loop recorder, and SpO2 91-94% RA.       /66   Pulse 67   Temp 36.5 °C (97.7 °F) (Temporal)   Resp 18   Ht 1.575 m (5' 2\")   Wt (!) 128 kg (282 lb)   LMP  (LMP Unknown)   SpO2 92%   BMI 51.58 kg/m²     "

## 2024-04-07 NOTE — DISCHARGE INSTRUCTIONS
You have a small pulmonary nodule on your CT scan this just needs to be followed up in 6 months to see whether this is resolved or worsens normally this can sustain postviral but it can also be in the secondary to tobacco use.

## 2024-04-07 NOTE — ED PROVIDER NOTES
ED Provider Note    CHIEF COMPLAINT  Chief Complaint   Patient presents with    Shortness of Breath     BIBA from home for SOB x1.5 hrs. Pt reports PMH COPD, a.fib, sleep apnea. Wears 2lpm O2 at home. Endorses smoking today.      Chest Pain     Mid sternal CP radiating to bilat upper arms and upper back. Pt reports onset of CP x 1.5 hrs ago.        EXTERNAL RECORDS REVIEWED  Inpatient Notes patient was admitted 3/24 for back pain she does have a history of COPD schizophrenia A-fib on 2 L nasal cannula at home.    HPI/ROS  LIMITATION TO HISTORY   Select: : None  OUTSIDE HISTORIAN(S):  EMS twelve-lead for EMS was unremarkable she is on her baseline oxygen    Lorene Haro is a 48 y.o. female who presents to the emerged part chief complaint of shortness of breath.  She states that she broke down and started smoking more today than normal and then started to feel short of breath this evening.  She endorses some generalized chest pain to her arms and back but is also sharp in nature.  She denies fevers chills but she has had a cough and is feeling short of breath.  She denies any worsening unilateral bilateral leg swelling.  She states she is on her baseline oxygen but just something was not feeling right.  She denies fevers chills cough or congestion    PAST MEDICAL HISTORY   has a past medical history of A-fib (HCC), MONI (acute kidney injury) ([facility]) (8/9/2023), Asthma, Bipolar 2 disorder (HCC), Chickenpox, Chronic obstructive pulmonary disease (HCC), Hypertension, On home oxygen therapy, Other psychological stress, Schizophrenic disorder (HCC), and Yeast infection of the skin (04/01/2021).    SURGICAL HISTORY   has a past surgical history that includes hysterectomy laparoscopy; colectomy; cholecystectomy; and knee arthroscopy (Left).    FAMILY HISTORY  Family History   Problem Relation Age of Onset    No Known Problems Mother     Cancer Sister        SOCIAL HISTORY  Social History     Tobacco Use    Smoking status:  "Some Days     Current packs/day: 0.00     Types: Cigarettes     Last attempt to quit: 10/12/2022     Years since quittin.4    Smokeless tobacco: Never   Vaping Use    Vaping Use: Never used   Substance and Sexual Activity    Alcohol use: Not Currently    Drug use: Never    Sexual activity: Not Currently       CURRENT MEDICATIONS  Home Medications       Reviewed by Nohemy Garcia R.N. (Registered Nurse) on 24 at 0014  Med List Status: Partial     Medication Last Dose Status   ACETAMINOPHEN PO  Active   ADVAIR DISKUS 100-50 MCG/ACT AEROSOL POWDER, BREATH ACTIVATED  Active   albuterol 108 (90 Base) MCG/ACT Aero Soln inhalation aerosol  Active   apixaban (ELIQUIS) 5mg Tab  Active   aripiprazole (ABILIFY) 30 MG tablet  Active   atorvastatin (LIPITOR) 20 MG Tab  Active   dicyclomine (BENTYL) 20 MG Tab  Active   fluticasone (FLONASE) 50 MCG/ACT nasal spray  Active   furosemide (LASIX) 20 MG Tab  Active   methylPREDNISolone (MEDROL DOSEPAK) 4 MG Tablet Therapy Pack  Active   ondansetron (ZOFRAN ODT) 4 MG TABLET DISPERSIBLE  Active   sulfamethoxazole-trimethoprim (BACTRIM DS) 800-160 MG tablet  Active                    ALLERGIES  Allergies   Allergen Reactions    Penicillin G Rash and Itching     pt reports that she gets a rash all over her body and gets itchy    Amoxicillin Rash and Itching     Tolerates cephalosporins, pt reports that she gets a rash all over her body and gets itchy       PHYSICAL EXAM  VITAL SIGNS: /66   Pulse 67   Temp 36.5 °C (97.7 °F) (Temporal)   Resp 18   Ht 1.575 m (5' 2\")   Wt (!) 128 kg (282 lb)   LMP  (LMP Unknown)   SpO2 92%   BMI 51.58 kg/m²    Pulse OX: Pulse Oxygen level is normal on 2L   Constitutional: Alert in no apparent distress.  HENT: Normocephalic, Atraumatic, MMM  Eyes: PERound. Conjunctiva normal, non-icteric.   Heart: Regular rate and rhythm, intact distal pulses   Lungs: Symmetrical movement, no resp distress expiratory wheezes at the bases " bilaterally without respiratory distress or tachypnea  Abdomen: Non-tender, non-distended, normal bowel sounds  EXT/Back obese extremities with chronic skin changes no acute cellulitis or pitting edema  Skin: Warm, Dry, No erythema, No rash.   Neurologic: Alert and oriented, Grossly non-focal.       EKG/LABS  Labs Reviewed   CBC WITH DIFFERENTIAL - Abnormal; Notable for the following components:       Result Value    WBC 13.4 (*)     MCHC 31.6 (*)     RDW 55.9 (*)     Neutrophils-Polys 74.10 (*)     Lymphocytes 17.60 (*)     Neutrophils (Absolute) 9.89 (*)     Monos (Absolute) 0.87 (*)     All other components within normal limits   COMP METABOLIC PANEL - Abnormal; Notable for the following components:    Glucose 153 (*)     Alkaline Phosphatase 123 (*)     All other components within normal limits   TROPONIN - Abnormal; Notable for the following components:    Troponin T 32 (*)     All other components within normal limits   PROBRAIN NATRIURETIC PEPTIDE, NT - Abnormal; Notable for the following components:    NT-proBNP 162 (*)     All other components within normal limits   ESTIMATED GFR       Results for orders placed or performed during the hospital encounter of 24   EKG   Result Value Ref Range    Report       [facility] Emergency Dept.    Test Date:  2024  Pt Name:    ADELINA MILLAN                Department: ER  MRN:        [id number]                      Room:        38  Gender:     Female                       Technician: [id number]  :        1975                   Requested By:ER TRIAGE PROTOCOL  Order #:    [id number]                    Reading MD: Candis Mon MD    Measurements  Intervals                                Axis  Rate:       75                           P:          0  OK:         0                            QRS:        75  QRSD:       98                           T:          23  QT:         393  QTc:        439    Interpretive Statements  Sinus rhythm at a  rate of 75 no ST elevations or ST depressions no abnormal T  wave inversions or Q waves normal intervals normal axis.  Compared to ECG 04/04/2024 13:07:05  SINUS TACHYCARDIA  Electronically Signed On 04- 02:16:56 PDT by Candis Mon MD       *Note: Due to a large number of results and/or encounters for the requested time period, some results have not been displayed. A complete set of results can be found in Results Review.       I have independently interpreted this EKG    RADIOLOGY  I have independently interpreted the diagnostic imaging associated with this visit and am waiting the final reading from the radiologist.   My preliminary interpretation is as follows:     Chest x-ray with cardiomegaly no interstitial edema or effusion  CT pulmonary angiogram without moderate or large pulmonary emboli and no signs of pneumonia    Radiologist interpretation:  DX-CHEST-PORTABLE (1 VIEW)   Final Result      1.  Cardiomegaly. No overt failure or pneumonia.      CT-CTA CHEST PULMONARY ARTERY W/ RECONS    (Results Pending)       COURSE & MEDICAL DECISION MAKING    ASSESSMENT, COURSE AND PLAN  Care Narrative:     Patient is a 48-year-old female presents emerged from with shortness of breath after smoking she is moderately obese as well as on baseline oxygen she is not in current respiratory distress at this time but she is on baseline oxygen.  She does have some scattered wheezes the patient be given a nebulizer treatment in terms of the chest discomfort her EKG shows no acute changes I have low concern for ACS she states is mostly just to the wheezing like sensation.    DISPOSITION AND DISCUSSIONS  1:34 AM  I rechecked the patient at the bedside.  She is resting comfortably.  She states she is still feeling short of breath and uncomfortable.  So CT pulmonary angiogram was ordered at this time.  Patient's troponin is at her baseline and only minimally elevated proBNP without signs of fluid overload on chest  x-ray.    2:06 AM  Reassessed the patient discussed pulmonary nodule but no other signs of pulmonary emboli infectious process.  She has mildly low white blood cell count but otherwise no acute signs of infection at this time.  She is still on her baseline oxygen she really wishes to go home and she is feeling much more comfortable and more relaxed.  We discussed tobacco cessation which she does not wish for help at this time as she already has nicotine patches at home.  Patient's heart score is 3 night likely suspect this is just secondary to her underlying COPD.  She will return to the ED for any new or worsening issues.      I have discussed management of the patient with the following physicians and JAY's:  none    Discussion of management with other QHP or appropriate source(s): None     Escalation of care considered, and ultimately not performed:acute inpatient care management, however at this time, the patient is most appropriate for outpatient management    Barriers to care at this time, including but not limited to:  na .     Decision tools and prescription drugs considered including, but not limited to:  heart score 3 .    The patient will return for new or worsening symptoms and is stable at the time of discharge.    The patient is referred to a primary physician for blood pressure management, diabetic screening, and for all other preventative health concerns.    DISPOSITION:  Patient will be discharged home in stable condition.    FOLLOW UP:  [facility], Emergency Dept  [address] [address] [phone]    If symptoms worsen      OUTPATIENT MEDICATIONS:  Discharge Medication List as of 4/7/2024  2:25 AM            FINAL DIAGNOSIS  1. Tobacco use    2. Shortness of breath           Electronically signed by: Candis Mon M.D., 4/7/2024 12:16 AM

## 2024-04-09 ENCOUNTER — HOSPITAL ENCOUNTER (EMERGENCY)
Facility: MEDICAL CENTER | Age: 49
End: 2024-04-09
Payer: MEDICAID

## 2024-04-09 VITALS
RESPIRATION RATE: 20 BRPM | DIASTOLIC BLOOD PRESSURE: 59 MMHG | BODY MASS INDEX: 51.89 KG/M2 | TEMPERATURE: 97.7 F | WEIGHT: 282 LBS | SYSTOLIC BLOOD PRESSURE: 114 MMHG | HEIGHT: 62 IN | OXYGEN SATURATION: 94 % | HEART RATE: 64 BPM

## 2024-04-09 LAB
ALBUMIN SERPL BCP-MCNC: 4 G/DL (ref 3.2–4.9)
ALBUMIN/GLOB SERPL: 1.3 G/DL
ALP SERPL-CCNC: 134 U/L (ref 30–99)
ALT SERPL-CCNC: 11 U/L (ref 2–50)
ANION GAP SERPL CALC-SCNC: 10 MMOL/L (ref 7–16)
AST SERPL-CCNC: 13 U/L (ref 12–45)
BASOPHILS # BLD AUTO: 0.1 % (ref 0–1.8)
BASOPHILS # BLD: 0.01 K/UL (ref 0–0.12)
BILIRUB SERPL-MCNC: 0.7 MG/DL (ref 0.1–1.5)
BUN SERPL-MCNC: 9 MG/DL (ref 8–22)
CALCIUM ALBUM COR SERPL-MCNC: 9.7 MG/DL (ref 8.5–10.5)
CALCIUM SERPL-MCNC: 9.7 MG/DL (ref 8.5–10.5)
CHLORIDE SERPL-SCNC: 107 MMOL/L (ref 96–112)
CO2 SERPL-SCNC: 26 MMOL/L (ref 20–33)
CREAT SERPL-MCNC: 1.18 MG/DL (ref 0.5–1.4)
EKG IMPRESSION: NORMAL
EOSINOPHIL # BLD AUTO: 0.13 K/UL (ref 0–0.51)
EOSINOPHIL NFR BLD: 1.2 % (ref 0–6.9)
ERYTHROCYTE [DISTWIDTH] IN BLOOD BY AUTOMATED COUNT: 56.8 FL (ref 35.9–50)
GFR SERPLBLD CREATININE-BSD FMLA CKD-EPI: 56 ML/MIN/1.73 M 2
GLOBULIN SER CALC-MCNC: 3 G/DL (ref 1.9–3.5)
GLUCOSE SERPL-MCNC: 114 MG/DL (ref 65–99)
HCT VFR BLD AUTO: 42.7 % (ref 37–47)
HGB BLD-MCNC: 13.4 G/DL (ref 12–16)
IMM GRANULOCYTES # BLD AUTO: 0.04 K/UL (ref 0–0.11)
IMM GRANULOCYTES NFR BLD AUTO: 0.4 % (ref 0–0.9)
LYMPHOCYTES # BLD AUTO: 1.09 K/UL (ref 1–4.8)
LYMPHOCYTES NFR BLD: 10 % (ref 22–41)
MCH RBC QN AUTO: 27.9 PG (ref 27–33)
MCHC RBC AUTO-ENTMCNC: 31.4 G/DL (ref 32.2–35.5)
MCV RBC AUTO: 88.8 FL (ref 81.4–97.8)
MONOCYTES # BLD AUTO: 0.67 K/UL (ref 0–0.85)
MONOCYTES NFR BLD AUTO: 6.2 % (ref 0–13.4)
NEUTROPHILS # BLD AUTO: 8.94 K/UL (ref 1.82–7.42)
NEUTROPHILS NFR BLD: 82.1 % (ref 44–72)
NRBC # BLD AUTO: 0 K/UL
NRBC BLD-RTO: 0 /100 WBC (ref 0–0.2)
PLATELET # BLD AUTO: 244 K/UL (ref 164–446)
PMV BLD AUTO: 11 FL (ref 9–12.9)
POTASSIUM SERPL-SCNC: 3.9 MMOL/L (ref 3.6–5.5)
PROT SERPL-MCNC: 7 G/DL (ref 6–8.2)
RBC # BLD AUTO: 4.81 M/UL (ref 4.2–5.4)
SODIUM SERPL-SCNC: 143 MMOL/L (ref 135–145)
WBC # BLD AUTO: 10.9 K/UL (ref 4.8–10.8)

## 2024-04-09 PROCEDURE — 93005 ELECTROCARDIOGRAM TRACING: CPT

## 2024-04-09 PROCEDURE — 302449 STATCHG TRIAGE ONLY (STATISTIC)

## 2024-04-09 PROCEDURE — 80053 COMPREHEN METABOLIC PANEL: CPT

## 2024-04-09 PROCEDURE — 85025 COMPLETE CBC W/AUTO DIFF WBC: CPT

## 2024-04-09 ASSESSMENT — FIBROSIS 4 INDEX: FIB4 SCORE: 0.63

## 2024-04-09 NOTE — ED TRIAGE NOTES
"Chief Complaint   Patient presents with    Cough     X 4-5 days     Weakness     \"I have been feeling weak since this morning.\"       Shortness of Breath     Reports history of COPD, wears 2L o2 at home at night.  States \"I haven't smoked in 2 days and I am not feeling better\".  Was seen here for same complaints on 4/7.      BIBA from home.  Pt. States she has been using her 2L o2 all day for the last 2 days and still feels SOB.     "

## 2024-04-09 NOTE — ED NOTES
PT approached ED desk and informed staff that she would be leaving. ED staff educated PT on the risks of leaving the ED without seeing a physician to be medically evaluated. ED staff instructed PT to return to ED should any medical changes arise. Will dismiss.

## 2024-04-14 ENCOUNTER — APPOINTMENT (OUTPATIENT)
Dept: RADIOLOGY | Facility: MEDICAL CENTER | Age: 49
End: 2024-04-14
Attending: EMERGENCY MEDICINE
Payer: MEDICAID

## 2024-04-14 ENCOUNTER — HOSPITAL ENCOUNTER (EMERGENCY)
Facility: MEDICAL CENTER | Age: 49
End: 2024-04-14
Attending: EMERGENCY MEDICINE
Payer: MEDICAID

## 2024-04-14 VITALS
WEIGHT: 293 LBS | DIASTOLIC BLOOD PRESSURE: 77 MMHG | RESPIRATION RATE: 16 BRPM | HEIGHT: 61 IN | OXYGEN SATURATION: 96 % | SYSTOLIC BLOOD PRESSURE: 125 MMHG | TEMPERATURE: 96.7 F | BODY MASS INDEX: 55.32 KG/M2 | HEART RATE: 66 BPM

## 2024-04-14 DIAGNOSIS — M54.12 CERVICAL RADICULAR PAIN: ICD-10-CM

## 2024-04-14 PROCEDURE — 99283 EMERGENCY DEPT VISIT LOW MDM: CPT

## 2024-04-14 PROCEDURE — 72125 CT NECK SPINE W/O DYE: CPT

## 2024-04-14 RX ORDER — METHYLPREDNISOLONE 4 MG/1
TABLET ORAL
Qty: 1 EACH | Refills: 0 | Status: SHIPPED | OUTPATIENT
Start: 2024-04-14

## 2024-04-14 RX ORDER — CYCLOBENZAPRINE HCL 10 MG
10 TABLET ORAL 3 TIMES DAILY PRN
Qty: 30 TABLET | Refills: 0 | Status: SHIPPED | OUTPATIENT
Start: 2024-04-14

## 2024-04-14 ASSESSMENT — LIFESTYLE VARIABLES: DO YOU DRINK ALCOHOL: NO

## 2024-04-14 ASSESSMENT — FIBROSIS 4 INDEX: FIB4 SCORE: 0.8

## 2024-04-14 NOTE — ED NOTES
Pt has been provided written and verbal education on DC for cervical neck pain. Follow up recommendations discussed. PT has been provided written and verbal education on prescribed medications and to obtained them from the [facility].

## 2024-04-14 NOTE — ED TRIAGE NOTES
".  Chief Complaint   Patient presents with    Back Pain     Pt ambulate to triage for above. States has had intermittent back pain x 1 week. Pt describes the pain as \"squeezing of the spine\" Pt denies any trauma.   Pt states has tried tylenol with no relief.   Pt states has a history of chronic back pain.   Pt Aox4, GCS15 and speaking in complete sentences. Educated on triage process and to alert staff if anything changes.   "

## 2024-04-14 NOTE — ED NOTES
"ERP at bedside.     Pt reports primarily pain is in neck. Denies new injury. She states \" she is not sure if she has had any falls.\"  "

## 2024-04-14 NOTE — ED PROVIDER NOTES
ED Provider Note    CHIEF COMPLAINT  Chief Complaint   Patient presents with    Back Pain       EXTERNAL RECORDS REVIEWED  Reviewed recent hospitalization records including imaging studies laboratory studies    HPI/ROS  LIMITATION TO HISTORY   Patient is a poor historian  OUTSIDE HISTORIAN(S):  None     Lorene Haro is a 48 y.o. female who presents evaluation of neck pain.  Contrary to initial triage nursing note the patient is not primarily complaining of mid or low back pain she is reporting pain in the neck.  She did have a fall that landed her in the hospital last week.  There was no specific imaging of the neck.  The patient denies any head injury.  She is on blood thinners.  She denies any focal numbness weakness tingling to the arms legs or face.  Pain is throbbing causing tightness on the lateral aspect of the neck.    PAST MEDICAL HISTORY   has a past medical history of A-fib ([facility]), MONI (acute kidney injury) ([facility]) (2023), Asthma, Bipolar 2 disorder ([facility]), Chickenpox, Chronic obstructive pulmonary disease ([facility]), Hypertension, On home oxygen therapy, Other psychological stress, Schizophrenic disorder ([facility]), and Yeast infection of the skin (2021).    SURGICAL HISTORY   has a past surgical history that includes hysterectomy laparoscopy; colectomy; cholecystectomy; and knee arthroscopy (Left).    FAMILY HISTORY  Family History   Problem Relation Age of Onset    No Known Problems Mother     Cancer Sister        SOCIAL HISTORY  Social History     Tobacco Use    Smoking status: Some Days     Current packs/day: 0.00     Types: Cigarettes     Last attempt to quit: 10/12/2022     Years since quittin.5    Smokeless tobacco: Never   Vaping Use    Vaping Use: Never used   Substance and Sexual Activity    Alcohol use: Not Currently    Drug use: Never    Sexual activity: Not Currently       CURRENT MEDICATIONS  Home Medications       Reviewed by Arlet Uriarte R.N. (Registered Nurse) on 24 at  "1425  Med List Status: Partial     Medication Last Dose Status   ACETAMINOPHEN PO  Active   ADVAIR DISKUS 100-50 MCG/ACT AEROSOL POWDER, BREATH ACTIVATED  Active   albuterol 108 (90 Base) MCG/ACT Aero Soln inhalation aerosol  Active   apixaban (ELIQUIS) 5mg Tab  Active   aripiprazole (ABILIFY) 30 MG tablet  Active   atorvastatin (LIPITOR) 20 MG Tab  Active   dicyclomine (BENTYL) 20 MG Tab  Active   fluticasone (FLONASE) 50 MCG/ACT nasal spray  Active   furosemide (LASIX) 20 MG Tab  Active   methylPREDNISolone (MEDROL DOSEPAK) 4 MG Tablet Therapy Pack  Active   ondansetron (ZOFRAN ODT) 4 MG TABLET DISPERSIBLE  Active                    ALLERGIES  Allergies   Allergen Reactions    Penicillin G Rash and Itching     pt reports that she gets a rash all over her body and gets itchy    Amoxicillin Rash and Itching     Tolerates cephalosporins, pt reports that she gets a rash all over her body and gets itchy       PHYSICAL EXAM  VITAL SIGNS: /58   Pulse 68   Temp 35.9 °C (96.6 °F) (Temporal)   Resp 20   Ht 1.549 m (5' 1\")   Wt (!) 137 kg (301 lb 13 oz)   LMP  (LMP Unknown)   SpO2 95%   BMI 57.03 kg/m²    Pulse ox interpretation: I interpret this pulse ox as normal.  Constitutional: Alert and oriented x 3, no acute distress  HEENT: Atraumatic normocephalic, pupils are equal round reactive to light extraocular movements are intact. The nares is clear, external ears are normal, mouth shows moist mucous membranes normal dentition for age  Neck: Supple, no JVD no tracheal deviation reproducible point tenderness at C3-C4 without step-off  Cardiovascular: Regular rate and rhythm no murmur rub or gallop 2+ pulses peripherally x4  Thorax & Lungs: No respiratory distress, no wheezes rales or rhonchi, No chest tenderness.   GI: Soft nontender nondistended positive bowel sounds, no peritoneal signs  Skin: Warm dry no acute rash or lesion  Musculoskeletal: Moving all extremities with full range and 5 of 5 strength no acute "  deformity  Neurologic: Cranial nerves III through XII are grossly intact no sensory deficit no cerebellar dysfunction normal  strength normal motor and sensory exam above below the waist  Psychiatric: Anxious        EKG/LABS    RADIOLOGY/PROCEDURES   I have independently interpreted the diagnostic imaging associated with this visit and am waiting the final reading from the radiologist.   My preliminary interpretation is as follows: No evidence of obvious fracture but extensive cervical spondylosis    Radiologist interpretation:  CT-CSPINE WITHOUT PLUS RECONS   Final Result      1.  Mild cervical spondylosis, most pronounced at C5-C7 levels. Endplate spurring at this levels with mild disc height loss.   2.  Minimal multilevel bilateral facet hypertrophy.   3.  No acute fracture or listhesis.          COURSE & MEDICAL DECISION MAKING    ASSESSMENT, COURSE AND PLAN  Care Narrative:     This is a very pleasant 48-year-old female who presents with neck pain.  She did have a fall several weeks ago for which she never complained of neck pain and I was worried about possible occult fracture.  Subsequent radiograph did not demonstrate any fracture but there is obviously some severe DJD.  We will place her on a short course of steroids and Flexeril and refer her to spine surgery for follow-up          ADDITIONAL PROBLEMS MANAGED      DISPOSITION AND DISCUSSIONS  I have discussed management of the patient with the following physicians and JAY's: None    Discussion of management with other QHP or appropriate source(s): None    Escalation of care considered, and ultimately not performed: Consider blood testing and additional imaging    Barriers to care at this time, including but not limited to: None.     Decision tools and prescription drugs considered including, but not limited to: Patient will be prescribed short course of steroids and Flexeril.    FINAL DIAGNOSIS  1. Cervical radicular pain  methylPREDNISolone (MEDROL  DOSEPAK) 4 MG Tablet Therapy Pack    cyclobenzaprine (FLEXERIL) 10 mg Tab    Referral to Spine Surgery               Electronically signed by: Juan Day M.D., 4/14/2024 2:23 PM

## 2024-04-25 LAB — EKG IMPRESSION: NORMAL

## 2024-05-01 ENCOUNTER — NON-PROVIDER VISIT (OUTPATIENT)
Dept: CARDIOLOGY | Facility: MEDICAL CENTER | Age: 49
End: 2024-05-01
Payer: MEDICAID

## 2024-05-01 PROCEDURE — 93298 REM INTERROG DEV EVAL SCRMS: CPT | Mod: 26 | Performed by: INTERNAL MEDICINE

## 2024-05-02 NOTE — CARDIAC REMOTE MONITOR - SCAN
Device transmission reviewed. Device demonstrated appropriate function.       Electronically Signed by: Nader Barraza M.D.    5/2/2024  4:03 PM

## 2024-05-03 ENCOUNTER — TELEPHONE (OUTPATIENT)
Dept: CARDIOLOGY | Facility: MEDICAL CENTER | Age: 49
End: 2024-05-03
Payer: MEDICAID

## 2024-05-03 NOTE — TELEPHONE ENCOUNTER
Called patient back and advised that it is okay to have an x-ray and MRI with a loop recorder. Patient requested I call Madeleine with [facility] group and let them know. I attempted to call [phone], no answer, left voicemail.

## 2024-05-03 NOTE — TELEPHONE ENCOUNTER
MT     Caller: Lorene Haro     Topic/issue: Pt called in regards to getting x-rays done and that they need information on her her pacer, before they can continue on with doing the x-rays please advise.    Office: [phone]    Callback Number: [phone] (home)      Thank you,     Malvin JOSHUA

## 2024-05-06 ENCOUNTER — HOSPITAL ENCOUNTER (EMERGENCY)
Facility: MEDICAL CENTER | Age: 49
End: 2024-05-06
Attending: EMERGENCY MEDICINE
Payer: MEDICAID

## 2024-05-06 ENCOUNTER — APPOINTMENT (OUTPATIENT)
Dept: RADIOLOGY | Facility: MEDICAL CENTER | Age: 49
End: 2024-05-06
Attending: EMERGENCY MEDICINE
Payer: MEDICAID

## 2024-05-06 VITALS
WEIGHT: 293 LBS | DIASTOLIC BLOOD PRESSURE: 57 MMHG | SYSTOLIC BLOOD PRESSURE: 96 MMHG | BODY MASS INDEX: 53.92 KG/M2 | HEART RATE: 74 BPM | RESPIRATION RATE: 22 BRPM | HEIGHT: 62 IN | OXYGEN SATURATION: 96 % | TEMPERATURE: 97.6 F

## 2024-05-06 DIAGNOSIS — R06.00 DYSPNEA, UNSPECIFIED TYPE: ICD-10-CM

## 2024-05-06 DIAGNOSIS — R60.0 LOWER EXTREMITY EDEMA: ICD-10-CM

## 2024-05-06 DIAGNOSIS — J44.1 ACUTE EXACERBATION OF CHRONIC OBSTRUCTIVE PULMONARY DISEASE (COPD) (HCC): ICD-10-CM

## 2024-05-06 LAB
ALBUMIN SERPL BCP-MCNC: 3.9 G/DL (ref 3.2–4.9)
ALBUMIN/GLOB SERPL: 1.3 G/DL
ALP SERPL-CCNC: 138 U/L (ref 30–99)
ALT SERPL-CCNC: 14 U/L (ref 2–50)
ANION GAP SERPL CALC-SCNC: 10 MMOL/L (ref 7–16)
AST SERPL-CCNC: 9 U/L (ref 12–45)
BASOPHILS # BLD AUTO: 0.3 % (ref 0–1.8)
BASOPHILS # BLD: 0.03 K/UL (ref 0–0.12)
BILIRUB SERPL-MCNC: 0.8 MG/DL (ref 0.1–1.5)
BUN SERPL-MCNC: 10 MG/DL (ref 8–22)
CALCIUM ALBUM COR SERPL-MCNC: 10 MG/DL (ref 8.5–10.5)
CALCIUM SERPL-MCNC: 9.9 MG/DL (ref 8.5–10.5)
CHLORIDE SERPL-SCNC: 104 MMOL/L (ref 96–112)
CO2 SERPL-SCNC: 29 MMOL/L (ref 20–33)
CREAT SERPL-MCNC: 0.9 MG/DL (ref 0.5–1.4)
EKG IMPRESSION: NORMAL
EOSINOPHIL # BLD AUTO: 0.1 K/UL (ref 0–0.51)
EOSINOPHIL NFR BLD: 0.9 % (ref 0–6.9)
ERYTHROCYTE [DISTWIDTH] IN BLOOD BY AUTOMATED COUNT: 52.2 FL (ref 35.9–50)
GFR SERPLBLD CREATININE-BSD FMLA CKD-EPI: 78 ML/MIN/1.73 M 2
GLOBULIN SER CALC-MCNC: 3 G/DL (ref 1.9–3.5)
GLUCOSE SERPL-MCNC: 146 MG/DL (ref 65–99)
HCT VFR BLD AUTO: 44.9 % (ref 37–47)
HGB BLD-MCNC: 14.1 G/DL (ref 12–16)
IMM GRANULOCYTES # BLD AUTO: 0.05 K/UL (ref 0–0.11)
IMM GRANULOCYTES NFR BLD AUTO: 0.5 % (ref 0–0.9)
LYMPHOCYTES # BLD AUTO: 1.72 K/UL (ref 1–4.8)
LYMPHOCYTES NFR BLD: 15.7 % (ref 22–41)
MCH RBC QN AUTO: 27.5 PG (ref 27–33)
MCHC RBC AUTO-ENTMCNC: 31.4 G/DL (ref 32.2–35.5)
MCV RBC AUTO: 87.5 FL (ref 81.4–97.8)
MONOCYTES # BLD AUTO: 0.45 K/UL (ref 0–0.85)
MONOCYTES NFR BLD AUTO: 4.1 % (ref 0–13.4)
NEUTROPHILS # BLD AUTO: 8.62 K/UL (ref 1.82–7.42)
NEUTROPHILS NFR BLD: 78.5 % (ref 44–72)
NRBC # BLD AUTO: 0 K/UL
NRBC BLD-RTO: 0 /100 WBC (ref 0–0.2)
NT-PROBNP SERPL IA-MCNC: 404 PG/ML (ref 0–125)
PLATELET # BLD AUTO: 232 K/UL (ref 164–446)
PMV BLD AUTO: 11.6 FL (ref 9–12.9)
POTASSIUM SERPL-SCNC: 3.7 MMOL/L (ref 3.6–5.5)
PROT SERPL-MCNC: 6.9 G/DL (ref 6–8.2)
RBC # BLD AUTO: 5.13 M/UL (ref 4.2–5.4)
SODIUM SERPL-SCNC: 143 MMOL/L (ref 135–145)
TROPONIN T SERPL-MCNC: 28 NG/L (ref 6–19)
WBC # BLD AUTO: 11 K/UL (ref 4.8–10.8)

## 2024-05-06 RX ORDER — ACETAMINOPHEN 325 MG/1
650 TABLET ORAL ONCE
Status: COMPLETED | OUTPATIENT
Start: 2024-05-06 | End: 2024-05-06

## 2024-05-06 RX ORDER — FUROSEMIDE 20 MG/1
20 TABLET ORAL PRN
Qty: 90 TABLET | Refills: 1 | Status: SHIPPED | OUTPATIENT
Start: 2024-05-06

## 2024-05-06 RX ORDER — DOXYCYCLINE 100 MG/1
100 TABLET ORAL ONCE
Status: COMPLETED | OUTPATIENT
Start: 2024-05-06 | End: 2024-05-06

## 2024-05-06 RX ORDER — DOXYCYCLINE 100 MG/1
100 CAPSULE ORAL 2 TIMES DAILY
Qty: 14 CAPSULE | Refills: 0 | Status: ACTIVE | OUTPATIENT
Start: 2024-05-06 | End: 2024-05-13

## 2024-05-06 RX ORDER — PREDNISONE 20 MG/1
60 TABLET ORAL DAILY
Qty: 15 TABLET | Refills: 0 | Status: SHIPPED | OUTPATIENT
Start: 2024-05-06 | End: 2024-05-11

## 2024-05-06 RX ORDER — IPRATROPIUM BROMIDE AND ALBUTEROL SULFATE 2.5; .5 MG/3ML; MG/3ML
3 SOLUTION RESPIRATORY (INHALATION)
Status: DISCONTINUED | OUTPATIENT
Start: 2024-05-06 | End: 2024-05-06 | Stop reason: HOSPADM

## 2024-05-06 RX ORDER — PREDNISONE 20 MG/1
60 TABLET ORAL ONCE
Status: COMPLETED | OUTPATIENT
Start: 2024-05-06 | End: 2024-05-06

## 2024-05-06 RX ORDER — ALBUTEROL SULFATE 90 UG/1
2 AEROSOL, METERED RESPIRATORY (INHALATION) EVERY 6 HOURS PRN
Qty: 8.5 G | Refills: 0 | Status: SHIPPED | OUTPATIENT
Start: 2024-05-06 | End: 2024-05-19

## 2024-05-06 RX ADMIN — PREDNISONE 60 MG: 20 TABLET ORAL at 15:45

## 2024-05-06 RX ADMIN — ACETAMINOPHEN 650 MG: 325 TABLET, FILM COATED ORAL at 16:27

## 2024-05-06 RX ADMIN — IPRATROPIUM BROMIDE AND ALBUTEROL SULFATE 3 ML: .5; 2.5 SOLUTION RESPIRATORY (INHALATION) at 16:01

## 2024-05-06 RX ADMIN — DOXYCYCLINE 100 MG: 100 TABLET, FILM COATED ORAL at 15:45

## 2024-05-06 ASSESSMENT — COPD QUESTIONNAIRES
HAVE YOU SMOKED AT LEAST 100 CIGARETTES IN YOUR ENTIRE LIFE: YES
COPD SCREENING SCORE: 6
DURING THE PAST 4 WEEKS HOW MUCH DID YOU FEEL SHORT OF BREATH: SOME OF THE TIME
DO YOU EVER COUGH UP ANY MUCUS OR PHLEGM?: YES, EVERY DAY

## 2024-05-06 ASSESSMENT — PAIN DESCRIPTION - PAIN TYPE: TYPE: ACUTE PAIN

## 2024-05-06 ASSESSMENT — FIBROSIS 4 INDEX: FIB4 SCORE: 0.8

## 2024-05-06 NOTE — ED TRIAGE NOTES
Chief Complaint   Patient presents with    Shortness of Breath     Pt states she is having a COPD exacerbation that started last night, pt states her inhaler did not help this morning     Chest Pain     Mid-sternal chest pain, non-radiating     Pt wheeled to triage for above complaints, A+O x 4, GCS 15, NAD, answering questions appropriately     SOB protocol ordered

## 2024-05-06 NOTE — ED PROVIDER NOTES
ER Provider Note    Scribed for Bentley Archer M.D. by Nanette Hardin. 5/6/2024   3:25 PM    Primary Care Provider: None noted    CHIEF COMPLAINT  Chief Complaint   Patient presents with    Shortness of Breath     Pt states she is having a COPD exacerbation that started last night, pt states her inhaler did not help this morning     Chest Pain     Mid-sternal chest pain, non-radiating     EXTERNAL RECORDS REVIEWED  The patient was last here in April 2024 for back pain.     HPI/ROS  Lorene Haro is a 48 y.o. female with a history of COPD who presents to the ED via EMS for evaluation of chronic shortness of breath worsening last night. She reports she used her inhaler with no relief. She reports associated mid chest pain that does not radiate anywhere. She reports she uses 2 liters of oxygen at night. EMS did not give any medications.    PAST MEDICAL HISTORY  Past Medical History:   Diagnosis Date    A-fib (HCC)     MONI (acute kidney injury) (HCC) 8/9/2023    Asthma     Bipolar 2 disorder (HCC)     Chickenpox     Chronic obstructive pulmonary disease (HCC)     Hypertension     On home oxygen therapy     Other psychological stress     Schizophrenic disorder (HCC)     Yeast infection of the skin 04/01/2021       SURGICAL HISTORY  Past Surgical History:   Procedure Laterality Date    CHOLECYSTECTOMY      COLECTOMY      HYSTERECTOMY LAPAROSCOPY      KNEE ARTHROSCOPY Left        FAMILY HISTORY  Family History   Problem Relation Age of Onset    No Known Problems Mother     Cancer Sister        SOCIAL HISTORY   reports that she has been smoking cigarettes. She has never used smokeless tobacco. She reports that she does not currently use alcohol. She reports that she does not use drugs.    CURRENT MEDICATIONS  Previous Medications    ACETAMINOPHEN PO    Take 2 Tablets by mouth 1 time a day as needed for Mild Pain.    ADVAIR DISKUS 100-50 MCG/ACT AEROSOL POWDER, BREATH ACTIVATED    Inhale 1 Puff 2 times a day.    ALBUTEROL  "108 (90 BASE) MCG/ACT AERO SOLN INHALATION AEROSOL    Inhale 2 Puffs every 6 hours as needed for Shortness of Breath.    APIXABAN (ELIQUIS) 5MG TAB    Take 1 Tablet by mouth 2 times a day.    ARIPIPRAZOLE (ABILIFY) 30 MG TABLET    Take 1 Tablet by mouth every morning. Indications: Major Depressive Disorder    ATORVASTATIN (LIPITOR) 20 MG TAB    Take 1 Tablet by mouth at bedtime.    CYCLOBENZAPRINE (FLEXERIL) 10 MG TAB    Take 1 Tablet by mouth 3 times a day as needed for Mild Pain.    DICYCLOMINE (BENTYL) 20 MG TAB    Take 1 Tablet by mouth every 6 hours as needed (abd pain, diarrhea).    FLUTICASONE (FLONASE) 50 MCG/ACT NASAL SPRAY    Administer 2 Sprays into each nostril every morning.    FUROSEMIDE (LASIX) 20 MG TAB    Take 1 Tablet by mouth as needed (Take 1 tablet daily as needed for swelling).    METHYLPREDNISOLONE (MEDROL DOSEPAK) 4 MG TABLET THERAPY PACK    Use as directed    ONDANSETRON (ZOFRAN ODT) 4 MG TABLET DISPERSIBLE    Take 1 Tablet by mouth every 6 hours as needed for Nausea/Vomiting.       ALLERGIES  Allergies   Allergen Reactions    Penicillin G Rash and Itching     pt reports that she gets a rash all over her body and gets itchy    Amoxicillin Rash and Itching     Tolerates cephalosporins, pt reports that she gets a rash all over her body and gets itchy        PHYSICAL EXAM  /65   Pulse 70   Temp 35.8 °C (96.5 °F) (Temporal)   Resp 18   Ht 1.575 m (5' 2\")   Wt (!) 136 kg (300 lb)   LMP  (LMP Unknown)   SpO2 92%   BMI 54.87 kg/m²    Nursing note and vitals reviewed.  Constitutional: Well-developed. No distress. Obese, Hirsutism  HENT: Head is normocephalic and atraumatic. Oropharynx is clear and moist without exudate or erythema.   Eyes: Pupils are equal, round, and reactive to light. Conjunctiva are normal.   Cardiovascular: Normal rate and regular rhythm. No murmur heard. Normal radial pulses.  Pulmonary/Chest: Diminished breath sounds throughout. No wheezes or rales.   Abdominal: " Soft and non-tender. No distention    Musculoskeletal: Extremities exhibit normal range of motion without edema or tenderness.   Neurological: Awake, alert and oriented to person, place, and time. No focal deficits noted.  Skin: Skin is warm and dry. No rash. psoriatic changes noted  Psychiatric: Normal mood and affect. Appropriate for clinical situation    DIAGNOSTIC STUDIES    Labs:   Results for orders placed or performed during the hospital encounter of 05/06/24   CBC with Differential   Result Value Ref Range    WBC 11.0 (H) 4.8 - 10.8 K/uL    RBC 5.13 4.20 - 5.40 M/uL    Hemoglobin 14.1 12.0 - 16.0 g/dL    Hematocrit 44.9 37.0 - 47.0 %    MCV 87.5 81.4 - 97.8 fL    MCH 27.5 27.0 - 33.0 pg    MCHC 31.4 (L) 32.2 - 35.5 g/dL    RDW 52.2 (H) 35.9 - 50.0 fL    Platelet Count 232 164 - 446 K/uL    MPV 11.6 9.0 - 12.9 fL    Neutrophils-Polys 78.50 (H) 44.00 - 72.00 %    Lymphocytes 15.70 (L) 22.00 - 41.00 %    Monocytes 4.10 0.00 - 13.40 %    Eosinophils 0.90 0.00 - 6.90 %    Basophils 0.30 0.00 - 1.80 %    Immature Granulocytes 0.50 0.00 - 0.90 %    Nucleated RBC 0.00 0.00 - 0.20 /100 WBC    Neutrophils (Absolute) 8.62 (H) 1.82 - 7.42 K/uL    Lymphs (Absolute) 1.72 1.00 - 4.80 K/uL    Monos (Absolute) 0.45 0.00 - 0.85 K/uL    Eos (Absolute) 0.10 0.00 - 0.51 K/uL    Baso (Absolute) 0.03 0.00 - 0.12 K/uL    Immature Granulocytes (abs) 0.05 0.00 - 0.11 K/uL    NRBC (Absolute) 0.00 K/uL   Comp Metabolic Panel   Result Value Ref Range    Sodium 143 135 - 145 mmol/L    Potassium 3.7 3.6 - 5.5 mmol/L    Chloride 104 96 - 112 mmol/L    Co2 29 20 - 33 mmol/L    Anion Gap 10.0 7.0 - 16.0    Glucose 146 (H) 65 - 99 mg/dL    Bun 10 8 - 22 mg/dL    Creatinine 0.90 0.50 - 1.40 mg/dL    Calcium 9.9 8.5 - 10.5 mg/dL    Correct Calcium 10.0 8.5 - 10.5 mg/dL    AST(SGOT) 9 (L) 12 - 45 U/L    ALT(SGPT) 14 2 - 50 U/L    Alkaline Phosphatase 138 (H) 30 - 99 U/L    Total Bilirubin 0.8 0.1 - 1.5 mg/dL    Albumin 3.9 3.2 - 4.9 g/dL     Total Protein 6.9 6.0 - 8.2 g/dL    Globulin 3.0 1.9 - 3.5 g/dL    A-G Ratio 1.3 g/dL   TROPONIN   Result Value Ref Range    Troponin T 28 (H) 6 - 19 ng/L   proBrain Natriuretic Peptide, NT   Result Value Ref Range    NT-proBNP 404 (H) 0 - 125 pg/mL   ESTIMATED GFR   Result Value Ref Range    GFR (CKD-EPI) 78 >60 mL/min/1.73 m 2   EKG   Result Value Ref Range    Report       [facility] Emergency Dept.    Test Date:  2024  Pt Name:    ADELINA MILLAN                Department: ER  MRN:        [id number]                      Room:  Gender:     Female                       Technician: [id number]  :        [id number]                   Requested By:ER TRIAGE PROTOCOL  Order #:    [id number]                    Reading MD:    Measurements  Intervals                                Axis  Rate:       70                           P:          37  PA:         130                          QRS:        57  QRSD:       109                          T:          -13  QT:         371  QTc:        401    Interpretive Statements  Sinus rhythm  Low voltage, precordial leads  RSR' in V1 or V2, right VCD or RVH  Borderline T abnormalities, inferior leads  Compared to ECG 2024 12:41:06  Right ventricular hypertrophy now present  RSR' in V1 or V2 now present  T-wave abnormality now present  Atrial fibrillation no longer present       *Note: Due to a large number of results and/or encounters for the requested time period, some results have not been displayed. A complete set of results can be found in Results Review.       EKG:   I have independently interpreted this EKG as detailed above.     Radiology:   This attending emergency physician has independently interpreted the diagnostic imaging associated with this visit and is awaiting the final reading from the radiologist.   Preliminary interpretation is a follows: Chest x-ray demonstrates no evidence of pneumonia    Radiologist interpretation:   DX-CHEST-PORTABLE (1  VIEW)   Final Result      1. The cardiac silhouette size remains enlarged.   2. No overt failure or pneumonia.           INITIAL ASSESSMENT AND PLAN    3:25 PM - Patient was evaluated at bedside for shortness of breath. Ordered for Dx-chest, Troponin, PNP, CBC with diff, CMP and EKG to evaluate. The patient will be medicated with Duoneb for her symptoms. Patient verbalizes understanding and support with my plan of care.  Differential diagnoses include but not limited to: COPD exacerbation, pneumonia.     COURSE AND MEDICAL DECISION MAKING    4:18 PM - The patient will be treated with Tylenol 650 mg.    Patient presents today with an overall clinical picture consistent with a COPD exacerbation.  Chest x-ray shows no evidence of pneumonia.  Laboratory studies are remarkable for chronically elevated troponin.  Mildly elevated BNP.  Patient feels better after breathing treatment.  Has a history of similar.  She feels that this is consistent with her COPD and I feel that that is likely the most likely etiology of her symptoms.  Clinically improved.  Good outpatient treatment candidate.  Return precautions discussed.    5:56 PM - Troponin is elevated but consistent with prior values. Will plan for discharge home with Albuterol, Doxycycline and Prednisone. She was informed of the plan for discharge home. Patient verbalizes understanding and agreement to this plan of care.       DISPOSITION AND DISCUSSIONS    Escalation of care considered, and ultimately not performed: acute inpatient care management, however at this time, the patient is most appropriate for outpatient management.    Barriers to care at this time, including but not limited to: Patient does not have established PCP.     Decision tools and prescription drugs considered including, but not limited to: Albuterol, Prednisone and Doxycycline.    DISPOSITION:  Patient will be discharged home in stable condition.    FOLLOW UP:  [facility],  Emergency Dept  [address] [id number] [phone]    If symptoms worsen      OUTPATIENT MEDICATIONS:  New Prescriptions    ALBUTEROL 108 (90 BASE) MCG/ACT AERO SOLN INHALATION AEROSOL    Inhale 2 Puffs every 6 hours as needed for Shortness of Breath.    DOXYCYCLINE (MONODOX) 100 MG CAPSULE    Take 1 Capsule by mouth 2 times a day for 7 days.    PREDNISONE (DELTASONE) 20 MG TAB    Take 3 Tablets by mouth every day for 5 days.       FINAL DIAGNOSIS  1. Acute exacerbation of chronic obstructive pulmonary disease (COPD) ([facility])    2. Dyspnea, unspecified type         I, Nanette Hardin (Scribraul), am scribing for, and in the presence of, Bentley Archer M.D..    Electronically signed by: Nanette Hardin (Kevonibraul), 5/6/2024    IBentley M.D. personally performed the services described in this documentation, as scribed by Nanette Hardin in my presence, and it is both accurate and complete.      The note accurately reflects work and decisions made by me.  Bentley Archer M.D.  5/6/2024  9:43 PM

## 2024-05-07 NOTE — ED NOTES
Patient has been provided written and verbal education for CPOD. She has been instructed to obtained her prescribed medications from [facility] pharmacy. She verbalized understanding of how to obtain and take those medications. Escorted to lobby on DC via  to call for a ride.

## 2024-05-19 ENCOUNTER — HOSPITAL ENCOUNTER (INPATIENT)
Facility: MEDICAL CENTER | Age: 49
LOS: 2 days | DRG: 190 | End: 2024-05-22
Attending: STUDENT IN AN ORGANIZED HEALTH CARE EDUCATION/TRAINING PROGRAM | Admitting: STUDENT IN AN ORGANIZED HEALTH CARE EDUCATION/TRAINING PROGRAM
Payer: MEDICAID

## 2024-05-19 ENCOUNTER — APPOINTMENT (OUTPATIENT)
Dept: RADIOLOGY | Facility: MEDICAL CENTER | Age: 49
DRG: 190 | End: 2024-05-19
Attending: STUDENT IN AN ORGANIZED HEALTH CARE EDUCATION/TRAINING PROGRAM
Payer: MEDICAID

## 2024-05-19 DIAGNOSIS — J96.01 ACUTE HYPOXEMIC RESPIRATORY FAILURE (HCC): ICD-10-CM

## 2024-05-19 DIAGNOSIS — J44.1 ACUTE EXACERBATION OF CHRONIC OBSTRUCTIVE PULMONARY DISEASE (COPD) (HCC): ICD-10-CM

## 2024-05-19 DIAGNOSIS — R06.00 DYSPNEA, UNSPECIFIED TYPE: ICD-10-CM

## 2024-05-19 DIAGNOSIS — R60.0 PERIPHERAL EDEMA: ICD-10-CM

## 2024-05-19 LAB
ALBUMIN SERPL BCP-MCNC: 3.6 G/DL (ref 3.2–4.9)
ALBUMIN/GLOB SERPL: 1.2 G/DL
ALP SERPL-CCNC: 125 U/L (ref 30–99)
ALT SERPL-CCNC: 17 U/L (ref 2–50)
ANION GAP SERPL CALC-SCNC: 9 MMOL/L (ref 7–16)
AST SERPL-CCNC: 11 U/L (ref 12–45)
BASOPHILS # BLD AUTO: 0.2 % (ref 0–1.8)
BASOPHILS # BLD: 0.02 K/UL (ref 0–0.12)
BILIRUB SERPL-MCNC: 0.6 MG/DL (ref 0.1–1.5)
BUN SERPL-MCNC: 10 MG/DL (ref 8–22)
CALCIUM ALBUM COR SERPL-MCNC: 9.7 MG/DL (ref 8.5–10.5)
CALCIUM SERPL-MCNC: 9.4 MG/DL (ref 8.5–10.5)
CHLORIDE SERPL-SCNC: 108 MMOL/L (ref 96–112)
CO2 SERPL-SCNC: 28 MMOL/L (ref 20–33)
CREAT SERPL-MCNC: 0.95 MG/DL (ref 0.5–1.4)
EKG IMPRESSION: NORMAL
EOSINOPHIL # BLD AUTO: 0.14 K/UL (ref 0–0.51)
EOSINOPHIL NFR BLD: 1.4 % (ref 0–6.9)
ERYTHROCYTE [DISTWIDTH] IN BLOOD BY AUTOMATED COUNT: 53.4 FL (ref 35.9–50)
FLUAV RNA SPEC QL NAA+PROBE: NEGATIVE
FLUBV RNA SPEC QL NAA+PROBE: NEGATIVE
GFR SERPLBLD CREATININE-BSD FMLA CKD-EPI: 73 ML/MIN/1.73 M 2
GLOBULIN SER CALC-MCNC: 2.9 G/DL (ref 1.9–3.5)
GLUCOSE SERPL-MCNC: 134 MG/DL (ref 65–99)
HCT VFR BLD AUTO: 43.1 % (ref 37–47)
HGB BLD-MCNC: 13.5 G/DL (ref 12–16)
IMM GRANULOCYTES # BLD AUTO: 0.05 K/UL (ref 0–0.11)
IMM GRANULOCYTES NFR BLD AUTO: 0.5 % (ref 0–0.9)
LYMPHOCYTES # BLD AUTO: 2.16 K/UL (ref 1–4.8)
LYMPHOCYTES NFR BLD: 21.1 % (ref 22–41)
MCH RBC QN AUTO: 27.6 PG (ref 27–33)
MCHC RBC AUTO-ENTMCNC: 31.3 G/DL (ref 32.2–35.5)
MCV RBC AUTO: 88.1 FL (ref 81.4–97.8)
MONOCYTES # BLD AUTO: 0.64 K/UL (ref 0–0.85)
MONOCYTES NFR BLD AUTO: 6.3 % (ref 0–13.4)
NEUTROPHILS # BLD AUTO: 7.22 K/UL (ref 1.82–7.42)
NEUTROPHILS NFR BLD: 70.5 % (ref 44–72)
NRBC # BLD AUTO: 0 K/UL
NRBC BLD-RTO: 0 /100 WBC (ref 0–0.2)
NT-PROBNP SERPL IA-MCNC: 417 PG/ML (ref 0–125)
PLATELET # BLD AUTO: 253 K/UL (ref 164–446)
PMV BLD AUTO: 11.4 FL (ref 9–12.9)
POTASSIUM SERPL-SCNC: 4.6 MMOL/L (ref 3.6–5.5)
PROT SERPL-MCNC: 6.5 G/DL (ref 6–8.2)
RBC # BLD AUTO: 4.89 M/UL (ref 4.2–5.4)
RSV RNA SPEC QL NAA+PROBE: NEGATIVE
SARS-COV-2 RNA RESP QL NAA+PROBE: NOTDETECTED
SODIUM SERPL-SCNC: 145 MMOL/L (ref 135–145)
SPECIMEN SOURCE: NORMAL
WBC # BLD AUTO: 10.2 K/UL (ref 4.8–10.8)

## 2024-05-19 RX ORDER — METOPROLOL SUCCINATE 25 MG/1
25 TABLET, EXTENDED RELEASE ORAL DAILY
COMMUNITY

## 2024-05-19 RX ORDER — MONTELUKAST SODIUM 10 MG/1
10 TABLET ORAL EVERY EVENING
COMMUNITY
End: 2024-05-30 | Stop reason: SDUPTHER

## 2024-05-19 RX ORDER — IPRATROPIUM BROMIDE AND ALBUTEROL SULFATE 2.5; .5 MG/3ML; MG/3ML
3 SOLUTION RESPIRATORY (INHALATION) ONCE
Status: COMPLETED | OUTPATIENT
Start: 2024-05-19 | End: 2024-05-20

## 2024-05-19 RX ORDER — METHYLPREDNISOLONE SODIUM SUCCINATE 125 MG/2ML
125 INJECTION, POWDER, LYOPHILIZED, FOR SOLUTION INTRAMUSCULAR; INTRAVENOUS ONCE
Status: COMPLETED | OUTPATIENT
Start: 2024-05-19 | End: 2024-05-19

## 2024-05-19 RX ORDER — FUROSEMIDE 10 MG/ML
40 INJECTION INTRAMUSCULAR; INTRAVENOUS ONCE
Status: COMPLETED | OUTPATIENT
Start: 2024-05-19 | End: 2024-05-19

## 2024-05-19 RX ADMIN — ALBUTEROL SULFATE 7.5 MG: 2.5 SOLUTION RESPIRATORY (INHALATION) at 23:18

## 2024-05-19 RX ADMIN — METHYLPREDNISOLONE SODIUM SUCCINATE 125 MG: 125 INJECTION, POWDER, FOR SOLUTION INTRAMUSCULAR; INTRAVENOUS at 22:57

## 2024-05-19 RX ADMIN — FUROSEMIDE 40 MG: 10 INJECTION INTRAMUSCULAR; INTRAVENOUS at 22:58

## 2024-05-19 RX ADMIN — IPRATROPIUM BROMIDE AND ALBUTEROL SULFATE 3 ML: 2.5; .5 SOLUTION RESPIRATORY (INHALATION) at 23:19

## 2024-05-19 ASSESSMENT — FIBROSIS 4 INDEX: FIB4 SCORE: 0.52

## 2024-05-20 PROBLEM — K92.1 BLOOD IN STOOL: Status: ACTIVE | Noted: 2024-05-20

## 2024-05-20 PROBLEM — J96.01 ACUTE HYPOXEMIC RESPIRATORY FAILURE (HCC): Status: ACTIVE | Noted: 2024-05-20

## 2024-05-20 LAB
B PARAP IS1001 DNA NPH QL NAA+NON-PROBE: NOT DETECTED
B PERT.PT PRMT NPH QL NAA+NON-PROBE: NOT DETECTED
C PNEUM DNA NPH QL NAA+NON-PROBE: NOT DETECTED
FLUAV RNA NPH QL NAA+NON-PROBE: NOT DETECTED
FLUBV RNA NPH QL NAA+NON-PROBE: NOT DETECTED
HADV DNA NPH QL NAA+NON-PROBE: NOT DETECTED
HCOV 229E RNA NPH QL NAA+NON-PROBE: NOT DETECTED
HCOV HKU1 RNA NPH QL NAA+NON-PROBE: NOT DETECTED
HCOV NL63 RNA NPH QL NAA+NON-PROBE: NOT DETECTED
HCOV OC43 RNA NPH QL NAA+NON-PROBE: NOT DETECTED
HMPV RNA NPH QL NAA+NON-PROBE: NOT DETECTED
HPIV1 RNA NPH QL NAA+NON-PROBE: NOT DETECTED
HPIV2 RNA NPH QL NAA+NON-PROBE: NOT DETECTED
HPIV3 RNA NPH QL NAA+NON-PROBE: NOT DETECTED
HPIV4 RNA NPH QL NAA+NON-PROBE: NOT DETECTED
M PNEUMO DNA NPH QL NAA+NON-PROBE: NOT DETECTED
MAGNESIUM SERPL-MCNC: 2.5 MG/DL (ref 1.5–2.5)
PROCALCITONIN SERPL-MCNC: 0.07 NG/ML
RSV RNA NPH QL NAA+NON-PROBE: NOT DETECTED
RV+EV RNA NPH QL NAA+NON-PROBE: NOT DETECTED
SARS-COV-2 RNA NPH QL NAA+NON-PROBE: NOTDETECTED
TROPONIN T SERPL-MCNC: 19 NG/L (ref 6–19)

## 2024-05-20 PROCEDURE — 99223 1ST HOSP IP/OBS HIGH 75: CPT | Mod: GC | Performed by: STUDENT IN AN ORGANIZED HEALTH CARE EDUCATION/TRAINING PROGRAM

## 2024-05-20 PROCEDURE — 99222 1ST HOSP IP/OBS MODERATE 55: CPT | Performed by: INTERNAL MEDICINE

## 2024-05-20 RX ORDER — AZITHROMYCIN 250 MG/1
500 TABLET, FILM COATED ORAL ONCE
Status: COMPLETED | OUTPATIENT
Start: 2024-05-20 | End: 2024-05-20

## 2024-05-20 RX ORDER — PREDNISONE 20 MG/1
40 TABLET ORAL DAILY
Status: DISCONTINUED | OUTPATIENT
Start: 2024-05-20 | End: 2024-05-22 | Stop reason: HOSPADM

## 2024-05-20 RX ORDER — IPRATROPIUM BROMIDE AND ALBUTEROL SULFATE 2.5; .5 MG/3ML; MG/3ML
3 SOLUTION RESPIRATORY (INHALATION)
Status: DISCONTINUED | OUTPATIENT
Start: 2024-05-20 | End: 2024-05-20

## 2024-05-20 RX ORDER — FUROSEMIDE 10 MG/ML
20 INJECTION INTRAMUSCULAR; INTRAVENOUS
Status: DISCONTINUED | OUTPATIENT
Start: 2024-05-20 | End: 2024-05-22 | Stop reason: HOSPADM

## 2024-05-20 RX ORDER — BENZONATATE 100 MG/1
100 CAPSULE ORAL 3 TIMES DAILY PRN
Status: DISCONTINUED | OUTPATIENT
Start: 2024-05-20 | End: 2024-05-22 | Stop reason: HOSPADM

## 2024-05-20 RX ORDER — FLUTICASONE PROPIONATE 50 MCG
2 SPRAY, SUSPENSION (ML) NASAL EVERY MORNING
Status: DISCONTINUED | OUTPATIENT
Start: 2024-05-20 | End: 2024-05-20

## 2024-05-20 RX ORDER — ACETAMINOPHEN 325 MG/1
650 TABLET ORAL EVERY 6 HOURS PRN
Status: DISCONTINUED | OUTPATIENT
Start: 2024-05-20 | End: 2024-05-22 | Stop reason: HOSPADM

## 2024-05-20 RX ORDER — PROMETHAZINE HYDROCHLORIDE 25 MG/1
12.5-25 TABLET ORAL EVERY 4 HOURS PRN
Status: DISCONTINUED | OUTPATIENT
Start: 2024-05-20 | End: 2024-05-22 | Stop reason: HOSPADM

## 2024-05-20 RX ORDER — IPRATROPIUM BROMIDE AND ALBUTEROL SULFATE 2.5; .5 MG/3ML; MG/3ML
3 SOLUTION RESPIRATORY (INHALATION)
Status: DISCONTINUED | OUTPATIENT
Start: 2024-05-20 | End: 2024-05-22 | Stop reason: HOSPADM

## 2024-05-20 RX ORDER — NICOTINE 21 MG/24HR
14 PATCH, TRANSDERMAL 24 HOURS TRANSDERMAL
Status: DISCONTINUED | OUTPATIENT
Start: 2024-05-20 | End: 2024-05-22 | Stop reason: HOSPADM

## 2024-05-20 RX ORDER — PROMETHAZINE HYDROCHLORIDE 25 MG/1
12.5-25 SUPPOSITORY RECTAL EVERY 4 HOURS PRN
Status: DISCONTINUED | OUTPATIENT
Start: 2024-05-20 | End: 2024-05-22 | Stop reason: HOSPADM

## 2024-05-20 RX ORDER — ONDANSETRON 2 MG/ML
4 INJECTION INTRAMUSCULAR; INTRAVENOUS EVERY 4 HOURS PRN
Status: DISCONTINUED | OUTPATIENT
Start: 2024-05-20 | End: 2024-05-22 | Stop reason: HOSPADM

## 2024-05-20 RX ORDER — ONDANSETRON 4 MG/1
4 TABLET, ORALLY DISINTEGRATING ORAL EVERY 4 HOURS PRN
Status: DISCONTINUED | OUTPATIENT
Start: 2024-05-20 | End: 2024-05-22 | Stop reason: HOSPADM

## 2024-05-20 RX ORDER — PROCHLORPERAZINE EDISYLATE 5 MG/ML
5-10 INJECTION INTRAMUSCULAR; INTRAVENOUS EVERY 4 HOURS PRN
Status: DISCONTINUED | OUTPATIENT
Start: 2024-05-20 | End: 2024-05-22 | Stop reason: HOSPADM

## 2024-05-20 RX ORDER — ARIPIPRAZOLE 15 MG/1
30 TABLET ORAL EVERY MORNING
Status: DISCONTINUED | OUTPATIENT
Start: 2024-05-20 | End: 2024-05-22 | Stop reason: HOSPADM

## 2024-05-20 RX ORDER — AZITHROMYCIN 250 MG/1
500 TABLET, FILM COATED ORAL EVERY EVENING
Status: DISCONTINUED | OUTPATIENT
Start: 2024-05-20 | End: 2024-05-20

## 2024-05-20 RX ORDER — MONTELUKAST SODIUM 10 MG/1
10 TABLET ORAL EVERY EVENING
Status: DISCONTINUED | OUTPATIENT
Start: 2024-05-20 | End: 2024-05-22 | Stop reason: HOSPADM

## 2024-05-20 RX ORDER — POLYETHYLENE GLYCOL 3350 17 G/17G
1 POWDER, FOR SOLUTION ORAL
Status: DISCONTINUED | OUTPATIENT
Start: 2024-05-20 | End: 2024-05-22 | Stop reason: HOSPADM

## 2024-05-20 RX ORDER — GUAIFENESIN 200 MG/10ML
10 LIQUID ORAL EVERY 4 HOURS PRN
Status: DISCONTINUED | OUTPATIENT
Start: 2024-05-20 | End: 2024-05-22 | Stop reason: HOSPADM

## 2024-05-20 RX ORDER — METOPROLOL SUCCINATE 25 MG/1
25 TABLET, EXTENDED RELEASE ORAL DAILY
Status: DISCONTINUED | OUTPATIENT
Start: 2024-05-20 | End: 2024-05-22 | Stop reason: HOSPADM

## 2024-05-20 RX ORDER — FLUTICASONE PROPIONATE AND SALMETEROL 100; 50 UG/1; UG/1
1 POWDER RESPIRATORY (INHALATION) 2 TIMES DAILY
Status: DISCONTINUED | OUTPATIENT
Start: 2024-05-20 | End: 2024-05-20

## 2024-05-20 RX ORDER — AZITHROMYCIN 250 MG/1
500 TABLET, FILM COATED ORAL DAILY
Status: COMPLETED | OUTPATIENT
Start: 2024-05-20 | End: 2024-05-21

## 2024-05-20 RX ORDER — CEFTRIAXONE 2 G/1
2000 INJECTION, POWDER, FOR SOLUTION INTRAMUSCULAR; INTRAVENOUS ONCE
Status: COMPLETED | OUTPATIENT
Start: 2024-05-20 | End: 2024-05-20

## 2024-05-20 RX ORDER — HYDRALAZINE HYDROCHLORIDE 20 MG/ML
10 INJECTION INTRAMUSCULAR; INTRAVENOUS EVERY 4 HOURS PRN
Status: DISCONTINUED | OUTPATIENT
Start: 2024-05-20 | End: 2024-05-22 | Stop reason: HOSPADM

## 2024-05-20 RX ORDER — ATORVASTATIN CALCIUM 20 MG/1
20 TABLET, FILM COATED ORAL
Status: DISCONTINUED | OUTPATIENT
Start: 2024-05-20 | End: 2024-05-22 | Stop reason: HOSPADM

## 2024-05-20 RX ORDER — AMOXICILLIN 250 MG
2 CAPSULE ORAL EVERY EVENING
Status: DISCONTINUED | OUTPATIENT
Start: 2024-05-20 | End: 2024-05-22 | Stop reason: HOSPADM

## 2024-05-20 RX ADMIN — ACETAMINOPHEN 650 MG: 325 TABLET, FILM COATED ORAL at 02:24

## 2024-05-20 RX ADMIN — ATORVASTATIN CALCIUM 20 MG: 20 TABLET, FILM COATED ORAL at 21:27

## 2024-05-20 RX ADMIN — MONTELUKAST 10 MG: 10 TABLET, FILM COATED ORAL at 02:24

## 2024-05-20 RX ADMIN — FUROSEMIDE 20 MG: 10 INJECTION INTRAMUSCULAR; INTRAVENOUS at 12:55

## 2024-05-20 RX ADMIN — NICOTINE 14 MG: 14 PATCH TRANSDERMAL at 06:38

## 2024-05-20 RX ADMIN — CEFTRIAXONE SODIUM 2000 MG: 2 INJECTION, POWDER, FOR SOLUTION INTRAMUSCULAR; INTRAVENOUS at 00:19

## 2024-05-20 RX ADMIN — MOMETASONE FUROATE AND FORMOTEROL FUMARATE DIHYDRATE 2 PUFF: 50; 5 AEROSOL RESPIRATORY (INHALATION) at 08:23

## 2024-05-20 RX ADMIN — MONTELUKAST 10 MG: 10 TABLET, FILM COATED ORAL at 17:17

## 2024-05-20 RX ADMIN — IPRATROPIUM BROMIDE AND ALBUTEROL SULFATE 3 ML: 2.5; .5 SOLUTION RESPIRATORY (INHALATION) at 10:31

## 2024-05-20 RX ADMIN — ARIPIPRAZOLE 30 MG: 15 TABLET ORAL at 06:39

## 2024-05-20 RX ADMIN — IPRATROPIUM BROMIDE AND ALBUTEROL SULFATE 3 ML: 2.5; .5 SOLUTION RESPIRATORY (INHALATION) at 06:25

## 2024-05-20 RX ADMIN — IPRATROPIUM BROMIDE AND ALBUTEROL SULFATE 3 ML: 2.5; .5 SOLUTION RESPIRATORY (INHALATION) at 02:28

## 2024-05-20 RX ADMIN — APIXABAN 5 MG: 5 TABLET, FILM COATED ORAL at 17:17

## 2024-05-20 RX ADMIN — APIXABAN 5 MG: 5 TABLET, FILM COATED ORAL at 06:39

## 2024-05-20 RX ADMIN — AZITHROMYCIN DIHYDRATE 500 MG: 250 TABLET, FILM COATED ORAL at 17:17

## 2024-05-20 RX ADMIN — AZITHROMYCIN DIHYDRATE 500 MG: 250 TABLET, FILM COATED ORAL at 00:18

## 2024-05-20 RX ADMIN — MOMETASONE FUROATE AND FORMOTEROL FUMARATE DIHYDRATE 2 PUFF: 50; 5 AEROSOL RESPIRATORY (INHALATION) at 16:43

## 2024-05-20 RX ADMIN — PREDNISONE 40 MG: 20 TABLET ORAL at 06:39

## 2024-05-20 RX ADMIN — METOPROLOL SUCCINATE 25 MG: 25 TABLET, EXTENDED RELEASE ORAL at 06:39

## 2024-05-20 RX ADMIN — SENNOSIDES AND DOCUSATE SODIUM 2 TABLET: 50; 8.6 TABLET ORAL at 17:17

## 2024-05-20 RX ADMIN — GUAIFENESIN 200 MG: 100 SOLUTION ORAL at 21:49

## 2024-05-20 SDOH — ECONOMIC STABILITY: TRANSPORTATION INSECURITY
IN THE PAST 12 MONTHS, HAS LACK OF RELIABLE TRANSPORTATION KEPT YOU FROM MEDICAL APPOINTMENTS, MEETINGS, WORK OR FROM GETTING THINGS NEEDED FOR DAILY LIVING?: NO

## 2024-05-20 ASSESSMENT — LIFESTYLE VARIABLES
DOES PATIENT WANT TO STOP DRINKING: NO
ON A TYPICAL DAY WHEN YOU DRINK ALCOHOL HOW MANY DRINKS DO YOU HAVE: 0
CONSUMPTION TOTAL: NEGATIVE
HAVE PEOPLE ANNOYED YOU BY CRITICIZING YOUR DRINKING: NO
HAVE YOU EVER FELT YOU SHOULD CUT DOWN ON YOUR DRINKING: NO
AVERAGE NUMBER OF DAYS PER WEEK YOU HAVE A DRINK CONTAINING ALCOHOL: 0
EVER HAD A DRINK FIRST THING IN THE MORNING TO STEADY YOUR NERVES TO GET RID OF A HANGOVER: NO
TOTAL SCORE: 0
TOTAL SCORE: 0
ALCOHOL_USE: NO
EVER FELT BAD OR GUILTY ABOUT YOUR DRINKING: NO
TOTAL SCORE: 0
HOW MANY TIMES IN THE PAST YEAR HAVE YOU HAD 5 OR MORE DRINKS IN A DAY: 0

## 2024-05-20 ASSESSMENT — GAIT ASSESSMENTS
GAIT LEVEL OF ASSIST: CONTACT GUARD ASSIST
ASSISTIVE DEVICE: FRONT WHEEL WALKER
DISTANCE (FEET): 100

## 2024-05-20 ASSESSMENT — COGNITIVE AND FUNCTIONAL STATUS - GENERAL
TOILETING: A LITTLE
DAILY ACTIVITIY SCORE: 19
HELP NEEDED FOR BATHING: A LITTLE
HELP NEEDED FOR BATHING: A LITTLE
MOVING TO AND FROM BED TO CHAIR: A LITTLE
DRESSING REGULAR LOWER BODY CLOTHING: A LITTLE
SUGGESTED CMS G CODE MODIFIER MOBILITY: CK
MOBILITY SCORE: 12
DRESSING REGULAR UPPER BODY CLOTHING: A LITTLE
WALKING IN HOSPITAL ROOM: A LOT
TURNING FROM BACK TO SIDE WHILE IN FLAT BAD: A LOT
CLIMB 3 TO 5 STEPS WITH RAILING: A LOT
STANDING UP FROM CHAIR USING ARMS: A LITTLE
STANDING UP FROM CHAIR USING ARMS: A LOT
DRESSING REGULAR UPPER BODY CLOTHING: A LITTLE
SUGGESTED CMS G CODE MODIFIER DAILY ACTIVITY: CK
MOVING FROM LYING ON BACK TO SITTING ON SIDE OF FLAT BED: A LITTLE
PERSONAL GROOMING: A LITTLE
SUGGESTED CMS G CODE MODIFIER MOBILITY: CL
MOVING FROM LYING ON BACK TO SITTING ON SIDE OF FLAT BED: A LOT
MOBILITY SCORE: 18
CLIMB 3 TO 5 STEPS WITH RAILING: A LITTLE
WALKING IN HOSPITAL ROOM: A LITTLE
SUGGESTED CMS G CODE MODIFIER DAILY ACTIVITY: CJ
TOILETING: A LITTLE
DAILY ACTIVITIY SCORE: 20
DRESSING REGULAR LOWER BODY CLOTHING: A LITTLE
MOVING TO AND FROM BED TO CHAIR: A LOT
TURNING FROM BACK TO SIDE WHILE IN FLAT BAD: A LITTLE

## 2024-05-20 ASSESSMENT — CHA2DS2 SCORE
DIABETES: NO
AGE 65 TO 74: NO
CHF OR LEFT VENTRICULAR DYSFUNCTION: NO
HYPERTENSION: NO
SEX: FEMALE
AGE 75 OR GREATER: NO
PRIOR STROKE OR TIA OR THROMBOEMBOLISM: NO
CHA2DS2 VASC SCORE: 1
VASCULAR DISEASE: NO

## 2024-05-20 ASSESSMENT — SOCIAL DETERMINANTS OF HEALTH (SDOH)
IN THE PAST 12 MONTHS, HAS THE ELECTRIC, GAS, OIL, OR WATER COMPANY THREATENED TO SHUT OFF SERVICE IN YOUR HOME?: NO
WITHIN THE LAST YEAR, HAVE TO BEEN RAPED OR FORCED TO HAVE ANY KIND OF SEXUAL ACTIVITY BY YOUR PARTNER OR EX-PARTNER?: NO
WITHIN THE LAST YEAR, HAVE YOU BEEN AFRAID OF YOUR PARTNER OR EX-PARTNER?: NO
WITHIN THE LAST YEAR, HAVE YOU BEEN KICKED, HIT, SLAPPED, OR OTHERWISE PHYSICALLY HURT BY YOUR PARTNER OR EX-PARTNER?: NO
WITHIN THE PAST 12 MONTHS, THE FOOD YOU BOUGHT JUST DIDN'T LAST AND YOU DIDN'T HAVE MONEY TO GET MORE: NEVER TRUE
WITHIN THE PAST 12 MONTHS, YOU WORRIED THAT YOUR FOOD WOULD RUN OUT BEFORE YOU GOT THE MONEY TO BUY MORE: NEVER TRUE
WITHIN THE LAST YEAR, HAVE YOU BEEN HUMILIATED OR EMOTIONALLY ABUSED IN OTHER WAYS BY YOUR PARTNER OR EX-PARTNER?: NO

## 2024-05-20 ASSESSMENT — ENCOUNTER SYMPTOMS
WEAKNESS: 1
SHORTNESS OF BREATH: 1
SPUTUM PRODUCTION: 1
COUGH: 1
BLOOD IN STOOL: 1

## 2024-05-20 ASSESSMENT — PAIN DESCRIPTION - PAIN TYPE: TYPE: ACUTE PAIN

## 2024-05-20 ASSESSMENT — ACTIVITIES OF DAILY LIVING (ADL): TOILETING: INDEPENDENT

## 2024-05-20 ASSESSMENT — FIBROSIS 4 INDEX: FIB4 SCORE: 0.53

## 2024-05-20 NOTE — CARE PLAN
Problem: Bronchoconstriction  Goal: Improve in air movement and diminished wheezing  Description: Target End Date:  2 to 3 days    1.  Implement inhaled treatments  2.  Evaluate and manage medication effects  Outcome: Progressing     Problem: Bronchopulmonary Hygiene  Goal: Increase mobilization of retained secretions  Description: Target End Date:  2 to 3 days    1.  Perform bronchopulmonary therapy as indicated by assessment  2.  Perform airway suctioning  3.  Perform actions to maintain patient airway  5/20/2024 1033 by Lynnette Bowman, RRT  Outcome: Progressing  5/20/2024 1032 by Lynnette Bowman, RRT  Outcome: Progressing  Duo q4  Flutter QID

## 2024-05-20 NOTE — CARE PLAN
The patient is Stable - Low risk of patient condition declining or worsening    Shift Goals  Clinical Goals: Monitor O2 sat, VSS, saftey  Patient Goals: sleep  Family Goals: ARAM    Progress made toward(s) clinical / shift goals:    Problem: Self Care  Goal: Patient will have the ability to perform ADLs independently or with assistance (bathe, groom, dress, toilet and feed)  Outcome: Progressing     Problem: Fall Risk  Goal: Patient will remain free from falls  Outcome: Progressing       Patient is not progressing towards the following goals:

## 2024-05-20 NOTE — ED NOTES
Med rec updated and complete. Allergies reviewed. Per interview with pt at bedside. Pt stated she longer lives in a group home.   Able to state name and date of birth. Pt confirmed medications and last doses taken.  Pt took both doses of  Eliquis on 05/19//24.  Pt denies antibiotic use in last 30 days. Pt has been out of her rescue inhaler > 1 month      Home pharmacy  [facility].

## 2024-05-20 NOTE — DIETARY
NUTRITION SERVICES: BMI - Pt with BMI >40 (=Body mass index is 50.89 kg/m².), Class III obesity. Weight loss counseling not appropriate in acute care setting.     RECOMMEND - If appropriate at DC please refer to outpatient nutrition services for weight management.

## 2024-05-20 NOTE — PROGRESS NOTES
Please see original H&P by Dr. Guzman for specific information  Patient was admitted earlier this morning  COPD exacerbation versus fluid overload  I have discussed with pulmonology, continue IV diuresis COPD treatment follow-up echocardiogram.  Continue monitoring H&H.  Continue RT per protocol, oxygen per protocol.

## 2024-05-20 NOTE — PROGRESS NOTES
Monitor Summary  Rhythm: SR  Rate: 75-78  Ectopy: PVC, PAC  Measurements: 0.18/0.09/0.35  ---12 hr Chart Review---

## 2024-05-20 NOTE — ED TRIAGE NOTES
Patient arrived via EMS from home for complaints of SOB, cough with + sputum production. Worse x1 week. HX of COPD. Uses 02 at home at night.     So also reports bloody stools. States as a week ago she had large around of blood in stool, but now it is just a small amount when wiping. +abdominal cramping intermittently.

## 2024-05-20 NOTE — ASSESSMENT & PLAN NOTE
Patient on apixaban bid for paroxysmal afib, reports having bloody bowel movement with dyschezia about 1 week ago with slow resolution.   -continue to monitor   -monitor daily cbc   -consider gi consult

## 2024-05-20 NOTE — CONSULTS
"Pulmonary Consultation    Date of consult: 5/20/2024    Referring Physician  NIEVES Canas.*    Reason for Consultation  Acute exacerbation of COPD    History of Presenting Illness  48 y.o. female who presented 5/19/2024 with myalgias and dyspnea  PMH reactive airway disease/COPD, chronic hypoxemic resp failure on 2LPM QHS, KATHIA, Body mass index is 50.89 kg/m². , Htn, schziophrenia. Recent ER visit eariler this month for similar symptoms, discharged home with steroids/abx with symptomatic improvement initally but then symptoms recurred with worsening dyspnea, couhg productive of white sputum, no fevers. 1 week ago reported BRBPR x 1.     In ED received steroids, abx, diuretics. CXR personally reviewed and unrevealing. Covid/flu/rsv negative. Hb stable. , chronically elevated    With regards to hx of COPD/reactive airway disease:  Smokes 0.5ppd, \"quit last week\"  Managed on Advair Diskus 100/50, not using singulair consistently, out of rescue inhalers  PFTs 2021 -> primarily restrictive pattern, ++ BD response  No PPV at night  Followed in the pulmonary clinic, last seen 2023 with Gianni SHRESTHA    CT chest 4/2024: no PE, no emphysema, + 3mm RML nodule (evident on CT abd/pelvis from 7/1/2023).    Code Status  Full Code    Review of Systems  Review of Systems   Constitutional:  Positive for malaise/fatigue.   Respiratory:  Positive for cough and shortness of breath (\"breathing easier\").    All other systems reviewed and are negative.    Past Medical History   has a past medical history of A-fib (HCC), MONI (acute kidney injury) ([facility]) (8/9/2023), Asthma, Bipolar 2 disorder ([facility]), Chickenpox, Chronic obstructive pulmonary disease (HCC), Hypertension, On home oxygen therapy, Other psychological stress, Schizophrenic disorder ([facility]), and Yeast infection of the skin (04/01/2021).    She has no past medical history of CAD (coronary artery disease), Cancer ([facility]), Congestive heart failure (HCC), Diabetes, Liver " disease, Seizure disorder (HCC), or Stroke (HCC).    Surgical History   has a past surgical history that includes hysterectomy laparoscopy; colectomy; cholecystectomy; and knee arthroscopy (Left).    Family History  family history includes Cancer in her sister; No Known Problems in her mother.    Social History   reports that she has been smoking cigarettes. She has never used smokeless tobacco. She reports that she does not currently use alcohol. She reports that she does not use drugs.    Medications  Home Medications       Reviewed by Robbie Valentine (Pharmacy Tech) on 05/19/24 at 2344  Med List Status: Complete     Medication Last Dose Status   ADVAIR DISKUS 100-50 MCG/ACT AEROSOL POWDER, BREATH ACTIVATED 5/19/2024 Active   apixaban (ELIQUIS) 5mg Tab 5/19/2024 Active   aripiprazole (ABILIFY) 30 MG tablet 5/19/2024 Active   atorvastatin (LIPITOR) 20 MG Tab 5/19/2024 Active   fluticasone (FLONASE) 50 MCG/ACT nasal spray 5/19/2024 Active   furosemide (LASIX) 20 MG Tab 5/19/2024 Active   metoprolol SR (TOPROL XL) 25 MG TABLET SR 24 HR 5/19/2024 Active   montelukast (SINGULAIR) 10 MG Tab 5/18/2024 Active                  Audit from Redirected Encounters    **Home medications have not yet been reviewed for this encounter**       Current Facility-Administered Medications   Medication Dose Route Frequency Provider Last Rate Last Admin    Respiratory Therapy Consult   Nebulization Continuous RT Marvin Guzman M.D.        acetaminophen (Tylenol) tablet 650 mg  650 mg Oral Q6HRS PRN Marvin Guzman M.D.   650 mg at 05/20/24 0224    senna-docusate (Pericolace Or Senokot S) 8.6-50 MG per tablet 2 Tablet  2 Tablet Oral Q EVENING Marvin Guzman M.D.        And    polyethylene glycol/lytes (Miralax) Packet 1 Packet  1 Packet Oral QDAY PRN Marvin Guzman M.D.        hydrALAZINE (Apresoline) injection 10 mg  10 mg Intravenous Q4HRS PRCORTNEY Guzman M.D.        ondansetron (Zofran) syringe/vial injection 4 mg  4 mg Intravenous  Q4HRS PRN Marvin Guzman M.D.        ondansetron (Zofran ODT) dispertab 4 mg  4 mg Oral Q4HRS PRN Marvin Guzman M.D.        promethazine (Phenergan) tablet 12.5-25 mg  12.5-25 mg Oral Q4HRS PRN Marvin Guzman M.D.        promethazine (Phenergan) suppository 12.5-25 mg  12.5-25 mg Rectal Q4HRS PRN Marvin Guzman M.D.        prochlorperazine (Compazine) injection 5-10 mg  5-10 mg Intravenous Q4HRS PRN Mavrin Guzman M.D.        nicotine (Nicoderm) 14 MG/24HR 14 mg  14 mg Transdermal Daily-0600 Marvin Guzman M.D.   14 mg at 05/20/24 0638    And    nicotine polacrilex (Nicorette) 2 MG piece 2 mg  2 mg Oral Q HOUR PRN Marvin Guzman M.D.        apixaban (Eliquis) tablet 5 mg  5 mg Oral BID Marvin Guzman M.D.   5 mg at 05/20/24 0639    ARIPiprazole (Abilify) tablet 30 mg  30 mg Oral QAM Marvin Guzman M.D.   30 mg at 05/20/24 0639    atorvastatin (Lipitor) tablet 20 mg  20 mg Oral QHS Marvin Guzman M.D.        metoprolol SR (Toprol XL) tablet 25 mg  25 mg Oral DAILY Marvin Guzman M.D.   25 mg at 05/20/24 0639    montelukast (Singulair) tablet 10 mg  10 mg Oral Q EVENING Marvin Guzman M.D.   10 mg at 05/20/24 0224    predniSONE (Deltasone) tablet 40 mg  40 mg Oral DAILY Marvin Guzman M.D.   40 mg at 05/20/24 0639    Respiratory Therapy Consult   Nebulization Continuous RT Marvin Guzman M.D.        ipratropium-albuterol (DUONEB) nebulizer solution  3 mL Nebulization Q4HRS (RT) Marvin Guzman M.D.   3 mL at 05/20/24 0625    ipratropium-albuterol (DUONEB) nebulizer solution  3 mL Nebulization Q2HRS PRN (RT) Marvin Guzman M.D.        azithromycin (Zithromax) tablet 500 mg  500 mg Oral Q EVENING Marvin Guzman M.D.        mometasone-formoterol (Dulera) 50-5 MCG/ACT inhaler 2 Puff  2 Puff Inhalation BID Marvin Guzman M.D.   2 Puff at 05/20/24 0823       Allergies  Allergies   Allergen Reactions    Penicillin G Rash and Itching     pt reports that she gets a rash all over her body and gets itchy    Amoxicillin Rash and  Itching     Tolerates cephalosporins, pt reports that she gets a rash all over her body and gets itchy       Vital Signs last 24 hours  Temp:  [36.3 °C (97.3 °F)-36.7 °C (98.1 °F)] 36.6 °C (97.9 °F)  Pulse:  [58-81] 79  Resp:  [16-25] 19  BP: (106-131)/(53-72) 116/67  SpO2:  [89 %-98 %] 94 %    Physical Exam  Physical Exam  Vitals and nursing note reviewed.   Constitutional:       General: She is not in acute distress.     Appearance: Normal appearance. She is well-developed. She is obese. She is ill-appearing. She is not diaphoretic.      Comments: Very pleasant   Eyes:      General: No scleral icterus.        Right eye: No discharge.         Left eye: No discharge.      Conjunctiva/sclera: Conjunctivae normal.      Pupils: Pupils are equal, round, and reactive to light.   Neck:      Thyroid: No thyromegaly.      Vascular: No JVD.   Cardiovascular:      Rate and Rhythm: Normal rate and regular rhythm.      Heart sounds: Normal heart sounds. No murmur heard.     No gallop.   Pulmonary:      Effort: Pulmonary effort is normal. No respiratory distress.      Breath sounds: Normal breath sounds. No wheezing or rales.      Comments: Distant bilateral breath sounds with poor air movement  Abdominal:      General: There is no distension.      Palpations: Abdomen is soft.      Tenderness: There is no abdominal tenderness. There is no guarding.   Musculoskeletal:         General: No tenderness.      Right lower leg: Edema present.      Left lower leg: Edema present.   Lymphadenopathy:      Cervical: No cervical adenopathy.   Skin:     General: Skin is warm.      Capillary Refill: Capillary refill takes less than 2 seconds.      Findings: No erythema or rash.   Neurological:      Mental Status: She is alert and oriented to person, place, and time.      Cranial Nerves: No cranial nerve deficit.      Sensory: No sensory deficit.      Motor: No abnormal muscle tone.   Psychiatric:         Behavior: Behavior normal.        Fluids  No intake or output data in the 24 hours ending 24 1013    Laboratory  Recent Results (from the past 48 hour(s))   EKG    Collection Time: 24  9:51 PM   Result Value Ref Range    Report       [facility] Emergency Dept.    Test Date:  2024  Pt Name:    ADELINA MILLAN                Department: ER  MRN:        [id number]                      Room:  Gender:     Female                       Technician: [id number]  :        [id number]                   Requested By:ER TRIAGE PROTOCOL  Order #:    [id number]                    Reading MD:    Measurements  Intervals                                Axis  Rate:       63                           P:          4  ME:         123                          QRS:        52  QRSD:       103                          T:          -5  QT:         386  QTc:        396    Interpretive Statements  Sinus rhythm  Low voltage, precordial leads  Compared to ECG 2024 13:53:07  Right ventricular hypertrophy no longer present  T-wave abnormality no longer present     CBC with Differential    Collection Time: 24 10:06 PM   Result Value Ref Range    WBC 10.2 4.8 - 10.8 K/uL    RBC 4.89 4.20 - 5.40 M/uL    Hemoglobin 13.5 12.0 - 16.0 g/dL    Hematocrit 43.1 37.0 - 47.0 %    MCV 88.1 81.4 - 97.8 fL    MCH 27.6 27.0 - 33.0 pg    MCHC 31.3 (L) 32.2 - 35.5 g/dL    RDW 53.4 (H) 35.9 - 50.0 fL    Platelet Count 253 164 - 446 K/uL    MPV 11.4 9.0 - 12.9 fL    Neutrophils-Polys 70.50 44.00 - 72.00 %    Lymphocytes 21.10 (L) 22.00 - 41.00 %    Monocytes 6.30 0.00 - 13.40 %    Eosinophils 1.40 0.00 - 6.90 %    Basophils 0.20 0.00 - 1.80 %    Immature Granulocytes 0.50 0.00 - 0.90 %    Nucleated RBC 0.00 0.00 - 0.20 /100 WBC    Neutrophils (Absolute) 7.22 1.82 - 7.42 K/uL    Lymphs (Absolute) 2.16 1.00 - 4.80 K/uL    Monos (Absolute) 0.64 0.00 - 0.85 K/uL    Eos (Absolute) 0.14 0.00 - 0.51 K/uL    Baso (Absolute) 0.02 0.00 - 0.12 K/uL    Immature  Granulocytes (abs) 0.05 0.00 - 0.11 K/uL    NRBC (Absolute) 0.00 K/uL   Comp Metabolic Panel    Collection Time: 05/19/24 10:06 PM   Result Value Ref Range    Sodium 145 135 - 145 mmol/L    Potassium 4.6 3.6 - 5.5 mmol/L    Chloride 108 96 - 112 mmol/L    Co2 28 20 - 33 mmol/L    Anion Gap 9.0 7.0 - 16.0    Glucose 134 (H) 65 - 99 mg/dL    Bun 10 8 - 22 mg/dL    Creatinine 0.95 0.50 - 1.40 mg/dL    Calcium 9.4 8.5 - 10.5 mg/dL    Correct Calcium 9.7 8.5 - 10.5 mg/dL    AST(SGOT) 11 (L) 12 - 45 U/L    ALT(SGPT) 17 2 - 50 U/L    Alkaline Phosphatase 125 (H) 30 - 99 U/L    Total Bilirubin 0.6 0.1 - 1.5 mg/dL    Albumin 3.6 3.2 - 4.9 g/dL    Total Protein 6.5 6.0 - 8.2 g/dL    Globulin 2.9 1.9 - 3.5 g/dL    A-G Ratio 1.2 g/dL   ESTIMATED GFR    Collection Time: 05/19/24 10:06 PM   Result Value Ref Range    GFR (CKD-EPI) 73 >60 mL/min/1.73 m 2   proBrain Natriuretic Peptide, NT    Collection Time: 05/19/24 10:06 PM   Result Value Ref Range    NT-proBNP 417 (H) 0 - 125 pg/mL   MAGNESIUM    Collection Time: 05/19/24 10:06 PM   Result Value Ref Range    Magnesium 2.5 1.5 - 2.5 mg/dL   CoV-2, Flu A/B, And RSV by PCR ([employer])    Collection Time: 05/19/24 10:59 PM    Specimen: Respirate   Result Value Ref Range    Influenza virus A RNA Negative Negative    Influenza virus B, PCR Negative Negative    RSV, PCR Negative Negative    SARS-CoV-2 by PCR NotDetected     SARS-CoV-2 Source NP Swab    TROPONIN    Collection Time: 05/20/24  9:38 AM   Result Value Ref Range    Troponin T 19 6 - 19 ng/L     Imaging  DX-CHEST-PORTABLE (1 VIEW)   Final Result         1.  No acute cardiopulmonary disease.   2.  Cardiomegaly   3.  Atherosclerosis      EC-ECHOCARDIOGRAM COMPLETE W/O CONT    (Results Pending)     Assessment/Plan    #Acute on Chronic Hypoxemic Respiratory Failure  #KATHIA/OHS not on PPV  #Body mass index is 50.89 kg/m².  #Prior tobacco use  #Asthma/COPD overlap- PFTs 2021 -> primarily restrictive pattern, ++ BD response, no  emphsyema on CT 4/2024  #Incidental pulmonary nodule- 3mm RML nodule (evident on CT abd/pelvis from 7/1/2023).    - clinical picture more concerning for volume overload given hx of HTN, untreated KATHIA, edema, elevated BNP above baseline. Agree with TTE, diuretics  - will check procal, extended resp PCR panel  - dunena PRN  - tobacco cessation counseling provided  - f/u pulm/sleep clinic for KATHIA/OHS, incidental nodule    We will sign off. Please do not hesitate to contact us if we can be of any further assistance. I am most easily reached via [employer] secure messaging application.    Discussed patient condition and risk of morbidity and/or mortality with Hospitalist, RT, and Patient.      This note was generated using voice recognition software which has a chance of producing errors of grammar and content.  I have made every reasonable attempt to find and correct any errors, but it should be expected that some may not be found prior to finalization of this note.  __________  Federico Sullivan MD  Pulmonary and Critical Care Medicine  [facility]

## 2024-05-20 NOTE — THERAPY
Physical Therapy   Initial Evaluation     Patient Name: Lorene Haro  Age:  48 y.o., Sex:  female  Medical Record #: [id number]  Today's Date: 5/20/2024     Precautions  Precautions: Fall Risk    Assessment  Patient is 48 y.o. female with a diagnosis of  acute hypoxemic respiratory failure with complaints of weakness, tiredness, dyspnea and feeling generally unwell. Patient has a past medical history notable for reactive airway disease, KATHIA, paroxysmal afib, COPD, htn, and schizophrenia .  Additional factors influencing patient status / progress : today, pt is agreeable and motivated to get up to move, and is limited most by her c/o fatigue with ambulation on 2L. Pt reports that she would usually be about to walk the length of the entire unit but today, asks to turn back early. Pt lives in a group home but also stays with her sister 4 nights a week. See details below. Pt will need to be up to walk in hallways at least twice a day using a FWW with nsg supervision to build her endurance.      Plan      DC Equipment Recommendations: None  Discharge Recommendations: Anticipate that the patient will have no further physical therapy needs after discharge from the hospital. Patient will not be actively followed for physical therapy services at this time, however may be seen if requested by physician for 1 more visit within 30 days to address any discharge or equipment needs.             Objective       05/20/24 1233   Charge Group   PT Evaluation PT Evaluation Mod   PT Self Care / Home Evaluation (Units) 1   Total Time Spent   PT Total Time Yes   PT Evaluation Time Spent (Mins) 20   PT Self Care/Home Evaluation Time Spent (Mins) 8   PT Total Time Spent (Calculated) 28   Initial Contact Note    Initial Contact Note Order Received and Verified, Evaluation Only - Patient Does Not Require Further Acute Physical Therapy at this Time.  However, May Benefit from Post Acute Therapy for Higher Level Functional Deficits.    Precautions   Precautions Fall Risk   Vitals   Pulse 82   Pulse Oximetry 92 %   O2 (LPM) 2   O2 Delivery Device Nasal Cannula   Pain 0 - 10 Group   Therapist Pain Assessment During Activity;Nurse Notified  (no c/o pain)   Prior Living Situation   Prior Services Other (Comments)   Housing / Facility Group Home  (assist for meal prep and meds)   Steps Into Home 0   Steps In Home 0   Equipment Owned 4-Wheel Walker;Oxygen  (2L O2 noc only)   Lives with - Patient's Self Care Capacity   (alone in her room at group home)   Comments pt reports staying overnight with her sister 4 nights a week and staying at group home for the rest of the week.   Prior Level of Functional Mobility   Bed Mobility Independent  (flat bed, no rail)   Transfer Status Independent   Ambulation Independent   Ambulation Distance community distances   Assistive Devices Used 4-Wheel Walker   Cognition    Comments pt appears to be at baseline function, anxious but cooperative, able to answer questions of PLF   Active ROM Lower Body    Active ROM Lower Body  WDL   Strength Lower Body   Lower Body Strength  WDL   Comments functionally observed   Sensation Lower Body   Lower Extremity Sensation   X   Comments decreased to light touch B feet, pt reports due to swelling   Lower Body Muscle Tone   Lower Body Muscle Tone  WDL   Balance Assessment   Sitting Balance (Static) Fair   Sitting Balance (Dynamic) Fair   Standing Balance (Static) Fair -   Standing Balance (Dynamic) Fair -   Weight Shift Sitting Fair   Weight Shift Standing Fair   Comments with FWW   Bed Mobility    Supine to Sit Standby Assist   Sit to Supine   (up chair)   Scooting Supervised   Rolling Supervised   Gait Analysis   Gait Level Of Assist Contact Guard Assist   Assistive Device Front Wheel Walker   Distance (Feet) 100   # of Times Distance was Traveled 1   Deviation   (Pt taking shallow, frequent breaths, reports that her endurance is not at her baseline)   Functional Mobility   Sit to  Stand Contact Guard Assist   Bed, Chair, Wheelchair Transfer Contact Guard Assist   Transfer Method Stand Pivot   6 Clicks Assessment - How much HELP from from another person do you currently need... (If the patient hasn't done an activity recently, how much help from another person do you think he/she would need if he/she tried?)   Turning from your back to your side while in a flat bed without using bedrails? 3   Moving from lying on your back to sitting on the side of a flat bed without using bedrails? 3   Moving to and from a bed to a chair (including a wheelchair)? 3   Standing up from a chair using your arms (e.g., wheelchair, or bedside chair)? 3   Walking in hospital room? 3   Climbing 3-5 steps with a railing? 3   6 clicks Mobility Score 18   Activity Tolerance   Standing 5 min   Education Group   Education Provided Role of Physical Therapist   Role of Physical Therapist Patient Response Patient;Acceptance;Explanation;Verbal Demonstration   Additional Comments Ed done re importance of daily hallway walking, 2 times per day. Pt was educated re need to call RN and ask for assist in going for walk in hallway using FWW.Pt agreeable to call nsg for assist with hallway walking.   Anticipated Discharge Equipment and Recommendations   DC Equipment Recommendations None   Discharge Recommendations Anticipate that the patient will have no further physical therapy needs after discharge from the hospital   Interdisciplinary Plan of Care Collaboration   IDT Collaboration with  Nursing   Patient Position at End of Therapy Seated;Chair Alarm On;Call Light within Reach;Tray Table within Reach;Phone within Reach   Collaboration Comments nsg updated   Session Information   Date / Session Number  5/20-1x only, dc needs only

## 2024-05-20 NOTE — H&P
[facility] Internal Medicine History & Physical Note    Date of Service  5/20/2024    Attending: Bud Tellez M.d.  Senior Resident: Dr. Guzman  Contact Number: [phone]    Primary Care Physician  Pcp Unknown    Consultants  None    Code Status  Full Code    Chief Complaint  Chief Complaint   Patient presents with    Shortness of Breath    Cough    Bloody Stools       History of Presenting Illness (HPI):   Lorene Haro is a 48 y.o. female who presented 5/19/2024 with complaints of weakness, tiredness, dyspnea and feeling generally unwell. Patient has a past medical history notable for reactive airway disease, KATHIA, paroxysmal afib, COPD, htn, and schizophrenia. Patient is on baseline 2L supplementary oxygen by nasal cannula at bedtime. Patient was seen recently in the ED on 5/6/24 with similar complaints of dyspnea, increased sputum production and was discharged from the ED with albuterol, prednisone 20mg for 5 days and doxycycline BID 7 days. Patient reports her symptoms improved after completing treatment. She was recently at her sister's house and stayed overnight. Her sister has 2 dogs and is a smoker. Patient reports feeling very tired and weak Saturday (day prior to presentation) and reports going back to her group home and going to sleep right away with use of her 2L nc supplementary oxygen. She reports that when she awoke still feeling unwell. She reports that while she was laying in her recliner, she felt she had an elephant on her chest and it was difficult to breath. She denies any f/c, chest pain, abdominal pain, vomiting, diarrhea or constipation. She endorses cough, productive of white sputum for 2 days, and nausea. She mentions when she went to see a physician about a week ago she passed a bloody bowel movement noting bright red blood in the toilet. She endorses dyschezia. She denies ever having a colonoscopy however did have a cologuard this year which was negative. Patient reports she was  scared to tell the medical provider about the bloody bowel movement. She denies any recent sick contacts. She also has complaints of neuropathy of bilateral feet secondary to edema.    ED course:   NT-proBNP 417; respiratory pcr viral panel flu/covid/rsv negative; cmp notable for elevated alk phos of 125. Was administered ceftriaxone, azithromycin, duonebs, 40mg lasix, 125mg IV methylprednisolone; chest xray revealed no acute cardiopulmonary disease, cardiomegaly, atherosclerosis.     I discussed the plan of care with patient.    Review of Systems  Review of Systems   Constitutional:  Positive for malaise/fatigue.   Respiratory:  Positive for sputum production and shortness of breath.    Gastrointestinal:  Positive for blood in stool.        Dyschezia     Neurological:  Positive for weakness.   All other systems reviewed and are negative.      Past Medical History   has a past medical history of A-fib ([facility]), MONI (acute kidney injury) ([facility]) (8/9/2023), Asthma, Bipolar 2 disorder ([facility]), Chickenpox, Chronic obstructive pulmonary disease ([facility]), Hypertension, On home oxygen therapy, Other psychological stress, Schizophrenic disorder ([facility]), and Yeast infection of the skin (04/01/2021).    Surgical History   has a past surgical history that includes hysterectomy laparoscopy; colectomy; cholecystectomy; and knee arthroscopy (Left).     Family History  family history includes Cancer in her sister; No Known Problems in her mother.   Family history reviewed with patient.     Social History  Tobacco: smoking 1ppd since age 16, quit last week  Alcohol: denies  Recreational drugs (illegal or prescription): denies  Employment: did not assess  Living Situation: group home  Recent Travel: did not assess  Primary Care Provider: Not Reviewed  Other (stressors, spirituality, exposures): did not assess    Allergies  Allergies   Allergen Reactions    Penicillin G Rash and Itching     pt reports that she gets a rash all over her body and  gets itchy    Amoxicillin Rash and Itching     Tolerates cephalosporins, pt reports that she gets a rash all over her body and gets itchy       Medications  Prior to Admission Medications   Prescriptions Last Dose Informant Patient Reported? Taking?   ADVAIR DISKUS 100-50 MCG/ACT AEROSOL POWDER, BREATH ACTIVATED 5/19/2024 at 0900 Patient Yes Yes   Sig: Inhale 1 Puff 2 times a day.   apixaban (ELIQUIS) 5mg Tab 5/19/2024 at 1700 Patient No Yes   Sig: Take 1 Tablet by mouth 2 times a day.   aripiprazole (ABILIFY) 30 MG tablet 5/19/2024 at 0900 Patient No Yes   Sig: Take 1 Tablet by mouth every morning. Indications: Major Depressive Disorder   atorvastatin (LIPITOR) 20 MG Tab 5/19/2024 at 2000 Patient No Yes   Sig: Take 1 Tablet by mouth at bedtime.   fluticasone (FLONASE) 50 MCG/ACT nasal spray 5/19/2024 at 0900 Patient Yes Yes   Sig: Administer 2 Sprays into each nostril every morning.   furosemide (LASIX) 20 MG Tab 5/19/2024 at 0900 Patient No Yes   Sig: Take 1 Tablet by mouth as needed (Take 1 tablet daily as needed for swelling).   metoprolol SR (TOPROL XL) 25 MG TABLET SR 24 HR 5/19/2024 at 0900 Patient Yes Yes   Sig: Take 25 mg by mouth every day.   montelukast (SINGULAIR) 10 MG Tab 5/18/2024 at 2000 Patient Yes Yes   Sig: Take 10 mg by mouth every evening.      Facility-Administered Medications: None       Physical Exam  Temp:  [36.3 °C (97.3 °F)] 36.3 °C (97.3 °F)  Pulse:  [58-78] 74  Resp:  [16-25] 21  BP: (106-131)/(53-72) 124/58  SpO2:  [89 %-98 %] 89 %  Blood Pressure: 124/58   Temperature: 36.3 °C (97.3 °F)   Pulse: 74   Respiration: (!) 21   Pulse Oximetry: 89 %       Physical Exam  Vitals and nursing note reviewed.   Constitutional:       General: She is awake.      Appearance: She is morbidly obese.      Interventions: Nasal cannula in place.   HENT:      Head: Normocephalic and atraumatic.      Mouth/Throat:      Mouth: Mucous membranes are moist.   Eyes:      Extraocular Movements: Extraocular  "movements intact.      Conjunctiva/sclera: Conjunctivae normal.      Pupils: Pupils are equal, round, and reactive to light.   Cardiovascular:      Rate and Rhythm: Normal rate and regular rhythm.      Pulses: Normal pulses.      Heart sounds: Normal heart sounds.      Comments: Distant heart sounds due to body habitus  Pulmonary:      Effort: Pulmonary effort is normal.      Comments: End-expiratory wheeze in left upper lobe of lung; distant breath sounds due to body habitus  Abdominal:      General: Abdomen is protuberant. Bowel sounds are normal.      Palpations: Abdomen is soft.      Tenderness: There is abdominal tenderness in the epigastric area.   Musculoskeletal:         General: Normal range of motion.      Cervical back: Normal range of motion.      Right lower leg: Edema present.      Left lower leg: Edema present.   Skin:     General: Skin is warm and dry.      Comments: Bilateral lower extremities with chronic venous stasis/dermatitis; edema of bilateral lower extremities 1+ nonpitting   Neurological:      General: No focal deficit present.      Mental Status: She is alert and oriented to person, place, and time.   Psychiatric:         Mood and Affect: Mood normal.         Behavior: Behavior normal. Behavior is cooperative.         Thought Content: Thought content normal.         Judgment: Judgment normal.         Laboratory:  Recent Labs     05/19/24 2206   WBC 10.2   RBC 4.89   HEMOGLOBIN 13.5   HEMATOCRIT 43.1   MCV 88.1   MCH 27.6   MCHC 31.3*   RDW 53.4*   PLATELETCT 253   MPV 11.4     Recent Labs     05/19/24 2206   SODIUM 145   POTASSIUM 4.6   CHLORIDE 108   CO2 28   GLUCOSE 134*   BUN 10   CREATININE 0.95   CALCIUM 9.4     Recent Labs     05/19/24 2206   ALTSGPT 17   ASTSGOT 11*   ALKPHOSPHAT 125*   TBILIRUBIN 0.6   GLUCOSE 134*         Recent Labs     05/19/24 2206   NTPROBNP 417*         No results for input(s): \"TROPONINT\" in the last 72 hours.    Imaging:  DX-CHEST-PORTABLE (1 VIEW) "   Final Result         1.  No acute cardiopulmonary disease.   2.  Cardiomegaly   3.  Atherosclerosis          X-Ray:  I have personally reviewed the images and compared with prior images.  EKG:  I have personally reviewed the images and compared with prior images.    Assessment/Plan:  Problem Representation:   Lorene Haro is a 48 y.o. female who presented 5/19/2024 with complaints of weakness, tiredness, dyspnea and feeling generally unwell. Patient has a past medical history notable for reactive airway disease, KATHIA, paroxysmal afib, COPD, htn, and schizophrenia.   I anticipate this patient is appropriate for observation status at this time because she may improve overnight with supportive treatment, duonebs, lasix, steroids and antibiotic    Patient will need a Telemetry bed on MEDICAL service .  The need is secondary to hypoxic respiratory failure.    * Acute hypoxemic respiratory failure (HCC)- (present on admission)  Assessment & Plan  Was administered 40mg IV lasix; chest xray revealing no acute cardiopulmonary disease, cardiomegaly, and atherosclerosis; on 2L baseline O2 at bedtime; may be having acute hypoxemic respiratory failure secondary to copd exacerbation  -continue azithromycin for 3 days total  -continue prednisone 40mg oral for a total of 5 days treatment with steroids  -RT protocol  -duonebs  -dulera  -resume home montelukast, flonase  -day team to consider continuing ceftriaxone, ordering extended respiratory panel by pcr, consider pulmonology consult, consider pulmonary rehab upon discharge     Blood in stool- (present on admission)  Assessment & Plan  Patient on apixaban bid for paroxysmal afib, reports having bloody bowel movement with dyschezia about 1 week ago with slow resolution.   -continue to monitor   -monitor daily cbc   -consider gi consult     Paroxysmal atrial fibrillation (HCC)- (present on admission)  Assessment & Plan  -continue home apixaban bid and metoprolol     Tobacco  abuse, in remission- (present on admission)  Assessment & Plan  Smoking 1ppd since age 16; quit 1 week ago  -nicotine replacement with nicotine patch 14mg, lozenge and gum     Morbid obesity (HCC)- (present on admission)  Assessment & Plan  BMI > 50; bmi 54.84  -outpatient follow up with pcp    ?possible Schizophrenia vs Bipolar?- (present on admission)  Assessment & Plan  -resume home aripiprazole           VTE prophylaxis: therapeutic anticoagulation with apixaban

## 2024-05-20 NOTE — ED NOTES
Pt wheeled to room, placed in gown, and on cardiac monitor. Complaint consistent with triage note. Pt reports using 2L/min O2 via NC aat night. Placed to 2L/min NC.

## 2024-05-20 NOTE — ED PROVIDER NOTES
CHIEF COMPLAINT  Chief Complaint   Patient presents with    Shortness of Breath    Cough    Bloody Stools       LIMITATION TO HISTORY   Select:     HPI    Lorene Haro is a 48 y.o. female who presents to the Emergency Department for evaluation of shortness of breath nonproductive cough and some bright red blood on the toilet paper which has been improving over the last several days uses oxygen at night notably hypoxic on room air while alert,     OUTSIDE HISTORIAN(S):  Select:    EXTERNAL RECORDS REVIEWED  Select:       PAST MEDICAL HISTORY  Past Medical History:   Diagnosis Date    A-fib (HCC)     MONI (acute kidney injury) (HCC) 2023    Asthma     Bipolar 2 disorder (HCC)     Chickenpox     Chronic obstructive pulmonary disease (HCC)     Hypertension     On home oxygen therapy     Other psychological stress     Schizophrenic disorder (HCC)     Yeast infection of the skin 2021     .    SURGICAL HISTORY  Past Surgical History:   Procedure Laterality Date    CHOLECYSTECTOMY      COLECTOMY      HYSTERECTOMY LAPAROSCOPY      KNEE ARTHROSCOPY Left          FAMILY HISTORY  Family History   Problem Relation Age of Onset    No Known Problems Mother     Cancer Sister           SOCIAL HISTORY  Social History     Socioeconomic History    Marital status: Single     Spouse name: Not on file    Number of children: Not on file    Years of education: Not on file    Highest education level: Not on file   Occupational History    Not on file   Tobacco Use    Smoking status: Some Days     Current packs/day: 0.00     Types: Cigarettes     Last attempt to quit: 10/12/2022     Years since quittin.6    Smokeless tobacco: Never   Vaping Use    Vaping status: Never Used   Substance and Sexual Activity    Alcohol use: Not Currently    Drug use: Never    Sexual activity: Not Currently   Other Topics Concern    Not on file   Social History Narrative    ** Merged History Encounter **         From [place]     Social [facility]     Financial Resource Strain: Medium Risk (5/4/2022)    Overall Financial Resource Strain (CARDIA)     Difficulty of Paying Living Expenses: Somewhat hard   Food Insecurity: No Food Insecurity (5/20/2024)    Hunger Vital Sign     Worried About Running Out of Food in the Last Year: Never true     Ran Out of Food in the Last Year: Never true   Transportation Needs: No Transportation Needs (5/20/2024)    PRAPARE - Transportation     Lack of Transportation (Medical): No     Lack of Transportation (Non-Medical): No   Physical Activity: Not on file   Stress: Not on file   Social Connections: Not on file   Intimate Partner Violence: Not At Risk (5/20/2024)    Humiliation, Afraid, Rape, and Kick questionnaire     Fear of Current or Ex-Partner: No     Emotionally Abused: No     Physically Abused: No     Sexually Abused: No   Housing Stability: High Risk (5/20/2024)    Housing Stability Vital Sign     Unable to Pay for Housing in the Last Year: No     Number of Places Lived in the Last Year: 4     Unstable Housing in the Last Year: No         CURRENT MEDICATIONS  No current facility-administered medications on file prior to encounter.     Current Outpatient Medications on File Prior to Encounter   Medication Sig Dispense Refill    montelukast (SINGULAIR) 10 MG Tab Take 10 mg by mouth every evening.      metoprolol SR (TOPROL XL) 25 MG TABLET SR 24 HR Take 25 mg by mouth every day.      furosemide (LASIX) 20 MG Tab Take 1 Tablet by mouth as needed (Take 1 tablet daily as needed for swelling). 90 Tablet 1    apixaban (ELIQUIS) 5mg Tab Take 1 Tablet by mouth 2 times a day. 200 Tablet 3    fluticasone (FLONASE) 50 MCG/ACT nasal spray Administer 2 Sprays into each nostril every morning.      ADVAIR DISKUS 100-50 MCG/ACT AEROSOL POWDER, BREATH ACTIVATED Inhale 1 Puff 2 times a day.      aripiprazole (ABILIFY) 30 MG tablet Take 1 Tablet by mouth every morning. Indications: Major Depressive Disorder 30 Tablet 0     "atorvastatin (LIPITOR) 20 MG Tab Take 1 Tablet by mouth at bedtime. 30 Tablet 0           ALLERGIES  Allergies   Allergen Reactions    Penicillin G Rash and Itching     pt reports that she gets a rash all over her body and gets itchy    Amoxicillin Rash and Itching     Tolerates cephalosporins, pt reports that she gets a rash all over her body and gets itchy       PHYSICAL EXAM  VITAL SIGNS:/70   Pulse 78   Temp 36.7 °C (98.1 °F) (Temporal)   Resp 18   Ht 1.575 m (5' 2\")   Wt (!) 126 kg (278 lb 3.5 oz)   LMP  (LMP Unknown)   SpO2 92%   BMI 50.89 kg/m²       GENERAL: Awake and alert  HEAD: Normocephalic and atraumatic  NECK: Normal range of motion, without meningismus  EYES: Pupils Equal, Round, Reactive to Light, extraocular movements intact, conjunctiva white  ENT: Mucous membranes moist, oropharynx clear  PULMONARY: Slightly increased effort, expiratory wheezing to auscultation  CARDIOVASCULAR: No murmurs, clicks or rubs, peripheral pulses 2+  ABDOMINAL: Soft, non-tender, no guarding or rigidity present, no pulsatile masses  BACK: no midline tenderness, no costovertebral tenderness  NEUROLOGICAL: Grossly non-focal neurological examination, speech normal, gait normal  EXTREMITIES: 1+ 2+ pretibial pitting edema, normal to inspection  SKIN: Warm and dry.  PSYCHIATRIC: Affect is appropriate    DIAGNOSTIC STUDIES / PROCEDURES  EKG  EKG Interpretation: I independently reviewed the below EKG and did not see signs of a STEMI  Results for orders placed or performed during the hospital encounter of 24   EKG   Result Value Ref Range    Report       [facility] Emergency Dept.    Test Date:  2024  Pt Name:    ADELINA MILLAN                Department: ER  MRN:        [id number]                      Room:  Gender:     Female                       Technician: [id number]  :        [id number]                   Requested By:ER TRIAGE PROTOCOL  Order #:    [id number]                    Reading " MD:    Measurements  Intervals                                Axis  Rate:       63                           P:          4  MA:         123                          QRS:        52  QRSD:       103                          T:          -5  QT:         386  QTc:        396    Interpretive Statements  Sinus rhythm  Low voltage, precordial leads  Compared to ECG 05/06/2024 13:53:07  Right ventricular hypertrophy no longer present  T-wave abnormality no longer present       *Note: Due to a large number of results and/or encounters for the requested time period, some results have not been displayed. A complete set of results can be found in Results Review.         Cardiac Monitor Interpretation:    Time: 2:15 AM  The cardiac monitor revealed normal sinus rhythm as interpreted by me.  The cardiac monitor was ordered secondary to the patient´s history of shortness of breath to monitor the patient for dysrhythmia      LABS  Labs Reviewed   CBC WITH DIFFERENTIAL - Abnormal; Notable for the following components:       Result Value    MCHC 31.3 (*)     RDW 53.4 (*)     Lymphocytes 21.10 (*)     All other components within normal limits   COMP METABOLIC PANEL - Abnormal; Notable for the following components:    Glucose 134 (*)     AST(SGOT) 11 (*)     Alkaline Phosphatase 125 (*)     All other components within normal limits   PROBRAIN NATRIURETIC PEPTIDE, NT - Abnormal; Notable for the following components:    NT-proBNP 417 (*)     All other components within normal limits   COV-2, FLU A/B, AND RSV BY PCR (CEPHEID)   ESTIMATED GFR   MAGNESIUM         RADIOLOGY  I have independently interpreted the diagnostic imaging associated with this visit and am waiting the final reading from the radiologist.   My preliminary interpretation is as follows: No pneumonia    COURSE & MEDICAL DECISION MAKING    ED COURSE:        INTERVENTIONS BY ME:  Medications   Respiratory Therapy Consult (has no administration in time range)   acetaminophen  (Tylenol) tablet 650 mg (650 mg Oral Given 5/20/24 0224)   senna-docusate (Pericolace Or Senokot S) 8.6-50 MG per tablet 2 Tablet (has no administration in time range)     And   polyethylene glycol/lytes (Miralax) Packet 1 Packet (has no administration in time range)   hydrALAZINE (Apresoline) injection 10 mg (has no administration in time range)   ondansetron (Zofran) syringe/vial injection 4 mg (has no administration in time range)   ondansetron (Zofran ODT) dispertab 4 mg (has no administration in time range)   promethazine (Phenergan) tablet 12.5-25 mg (has no administration in time range)   promethazine (Phenergan) suppository 12.5-25 mg (has no administration in time range)   prochlorperazine (Compazine) injection 5-10 mg (has no administration in time range)   nicotine (Nicoderm) 14 MG/24HR 14 mg (has no administration in time range)     And   nicotine polacrilex (Nicorette) 2 MG piece 2 mg (has no administration in time range)   apixaban (Eliquis) tablet 5 mg (has no administration in time range)   ARIPiprazole (Abilify) tablet 30 mg (has no administration in time range)   atorvastatin (Lipitor) tablet 20 mg (has no administration in time range)   metoprolol SR (Toprol XL) tablet 25 mg (has no administration in time range)   montelukast (Singulair) tablet 10 mg (10 mg Oral Given 5/20/24 0224)   predniSONE (Deltasone) tablet 40 mg (has no administration in time range)   Respiratory Therapy Consult (has no administration in time range)   ipratropium-albuterol (DUONEB) nebulizer solution (3 mL Nebulization Given 5/20/24 0228)   ipratropium-albuterol (DUONEB) nebulizer solution (has no administration in time range)   azithromycin (Zithromax) tablet 500 mg (has no administration in time range)   mometasone-formoterol (Dulera) 50-5 MCG/ACT inhaler 2 Puff (has no administration in time range)   albuterol (Proventil) 2.5mg/0.5ml nebulizer solution 7.5 mg (7.5 mg Nebulization Given 5/19/24 5976)    ipratropium-albuterol (DUONEB) nebulizer solution (3 mL Nebulization Given 5/19/24 7769)   methylPREDNISolone sod succ (SOLU-MEDROL) 125 MG injection 125 mg (125 mg Intravenous Given 5/19/24 2257)   furosemide (Lasix) injection 40 mg (40 mg Intravenous Given 5/19/24 2258)   cefTRIAXone (Rocephin) injection 2,000 mg (2,000 mg Intravenous Given 5/20/24 0019)   azithromycin (Zithromax) tablet 500 mg (500 mg Oral Given 5/20/24 0018)       Response on recheck:  The patient taken off oxygen and still hypoxic 87% on room air while awake will plan to admit patient she reports feeling somewhat improved but still feels short of breath Work of breathing and wheezing is improved    INITIAL ASSESSMENT, COURSE AND PLAN  Care Narrative:   Patient with COPD chronic nocturnal hypoxemia presenting with peripheral edema cough be easing provide treatment for CHF exacerbation and bronchodilator treatment for COPD patient presenting acutely hypoxic unfortunately patient remained hypoxic despite my best efforts to improve her symptoms prompting admission to the hospitalist for further monitoring and care given her pulmonary findings and peripheral edema and low risk Wells score do not suspect pulmonary embolism additionally patient without chest pain or ischemic EKG findings I do not suspect acute coronary syndrome.  BNP is in nondiagnostic zone unclear to me if patient has CHF      CBC and CMP without actionable findings     ADDITIONAL PROBLEM LIST    DISPOSITION AND DISCUSSIONS  Discussion of management with other Q or appropriate source(s): Non exacerbation of heart failure I have discussed management of the patient with the following physicians and JAY's: I disccused the mangament and decision to admit this patient with the Hospitalist. Dr. Bell  FINAL DIAGNOSIS  1. Acute hypoxemic respiratory failure (HCC)    2. Peripheral edema    3. Dyspnea, unspecified type    4. Acute exacerbation of chronic obstructive pulmonary disease  (COPD) ([facility])             Electronically signed by: Jase Jefferson DO ,5:09 AM 05/20/24

## 2024-05-20 NOTE — RESPIRATORY CARE
"  COPD EDUCATION by COPD CLINICAL EDUCATOR  5/20/2024  at  4:00 PM by Maylin Sullivan, RRT     Patient interviewed by education team.  Patient declined  to participate in the full bedside program.  Therefore, a short intervention has been conducted.  A comprehensive packet including information about COPD, types of treatments to manage their disease and safe home Oxygen usage was provided and reviewed with patient at the bedside.  Our team has assisted in her care many times.  She is established with [facility].  She seems motivated to engage and getting on top of her health challenges and quitting smoking  Smoking Cessation Intervention and education completed, 12 minutes spent on smoking cessation education with patient.  Provided smoking cessation packet with \"Tips to Quit\" and brochure for \"Free Virtual Smoking Cessation Classes\". She is interested and will send referral    COPD Screen  COPD Risk Screening  Do you have a history of COPD?: Yes  Do you have a Pulmonologist?: Yes    COPD Assessment  COPD Clinical Specialists ONLY  COPD Education Initiated: Yes--Short Intervention  Is this a COPD exacerbation patient?: No (notified Pulmonary and hedeemed not exacerbation -heart failure and hypoventilation)  DME Company: [employer] (now Preferred)  DME Equipment Type: 2 lpm O2 nebulizer and had AVAPS (needs to be restarted)  Physician Name: has upcoming Cardiology appointments  Pulmonologist Name: [facility] team call placed to [phone] for appointment asisstance (this will not be acute discharge appt.)  Referrals Initiated: Yes  Smoking Cessation: Yes  $ Smoking Cessation 3-10 Minutes: Symptomatic (reinforcement of her desire to stay quit and have success-sent referral for classes and gave info)  Home Health Care:  (TBD at this encounter lives in Group Home)  Mobile Urgent Care Services: Declined  Geriatric Specialty Group: N/A  $ Demo/Eval of SVN's, MDI's and Aerosols: Yes (review of all " "delivery-care and cleaning)    PFT Results  (OP) Pulmonary Function Testing: Yes (9/21/2021: FEV1-48, FEV1/FVC Ratio-86 Overlap Asthma/COPD)  Interdisciplinary Rounds: Attendance at Rounds (30 Min)    Meds to Beds  [facility] provides bedside medication delivery for all eligible patients at discharge.  Would you like to opt out of this program for any reason?: No - Stay Opted In     MY COPD ACTION PLAN     It is recommended that patients and physicians /healthcare providers complete this action plan together. This plan should be discussed at each physician visit and updated as needed.    The green, yellow and red zones show groups of symptoms of COPD. This list of symptoms is not comprehensive, and you may experience other symptoms. In the \"Actions\" column, your healthcare provider has recommended actions for you to take based on your symptoms.    Patient Name: Lorene Haro   YOB: 1975   Last Updated on: 5/20/2024  4:00 PM   Green Zone:  I am doing well today Actions     Usual activitiy and exercise level   Take daily medications     Usual amounts of cough and phlegm/mucus   Use oxygen as prescribed     Sleep well at night   Continue regular exercise/diet plan     Appetite is good   At all times avoid cigarette smoke, inhaled irritants     Daily Medications (these medications are taken every day):   Fluticosone/Salmeterol (Advair) 1 Puff Twice daily     Additional Information:  Rinse mouth and spit after using Advair    Yellow Zone:  I am having a bad day or a COPD flare Actions     More breathless than usual   Continue daily medications     I have less energy for my daily activities   Use quick relief inhaler as ordered     Increased or thicker phlegm/mucus   Use oxygen as prescribed     Using quick relief inhaler/nebulizer more often   Get plenty of rest     Swelling of ankles more than usual   Use pursed lip breathing     More coughing than usual   At all times avoid cigarette smoke, inhaled " "irritants     I feel like I have a \"chest cold\"     Poor sleep and my symptoms woke me up     My appetite is not good     My medicine is not helping      Call provider immediately if symptoms don’t improve     Continue daily medications, add rescue medications:   Albuterol  Albuterol 2 Puffs  3mL via nebulizer Every 6 hours PRN         Medications to be used during a flare up, (as Discussed with Provider):           Additional Information:  Use spacer with rescue inhaler  use nebulizer as directed by your Doctor    Red Zone:  I need urgent medical care Actions     Severe shortness of breath even at rest   Call 911 or seek medical care immediately     Not able to do any activity because of breathing      Fever or shaking chills      Feeling confused or very drowsy       Chest pains      Coughing up blood                  "

## 2024-05-20 NOTE — ASSESSMENT & PLAN NOTE
Was administered 40mg IV lasix; chest xray revealing no acute cardiopulmonary disease, cardiomegaly, and atherosclerosis; on 2L baseline O2 at bedtime; may be having acute hypoxemic respiratory failure secondary to copd exacerbation  -continue azithromycin for 3 days total  -continue prednisone 40mg oral for a total of 5 days treatment with steroids  -RT protocol  -duonebs  -dulera  -resume home montelukast, flonase  -day team to consider continuing ceftriaxone, ordering extended respiratory panel by pcr, consider pulmonology consult, consider pulmonary rehab upon discharge

## 2024-05-20 NOTE — ASSESSMENT & PLAN NOTE
Smoking 1ppd since age 16; quit 1 week ago  -nicotine replacement with nicotine patch 14mg, lozenge and gum

## 2024-05-21 ENCOUNTER — APPOINTMENT (OUTPATIENT)
Dept: CARDIOLOGY | Facility: MEDICAL CENTER | Age: 49
DRG: 190 | End: 2024-05-21
Attending: STUDENT IN AN ORGANIZED HEALTH CARE EDUCATION/TRAINING PROGRAM
Payer: MEDICAID

## 2024-05-21 LAB
ALBUMIN SERPL BCP-MCNC: 3.6 G/DL (ref 3.2–4.9)
ALBUMIN/GLOB SERPL: 1.2 G/DL
ALP SERPL-CCNC: 106 U/L (ref 30–99)
ALT SERPL-CCNC: 15 U/L (ref 2–50)
ANION GAP SERPL CALC-SCNC: 10 MMOL/L (ref 7–16)
AST SERPL-CCNC: 12 U/L (ref 12–45)
BASOPHILS # BLD AUTO: 0.1 % (ref 0–1.8)
BASOPHILS # BLD: 0.01 K/UL (ref 0–0.12)
BILIRUB SERPL-MCNC: 0.4 MG/DL (ref 0.1–1.5)
BUN SERPL-MCNC: 16 MG/DL (ref 8–22)
CALCIUM ALBUM COR SERPL-MCNC: 10 MG/DL (ref 8.5–10.5)
CALCIUM SERPL-MCNC: 9.7 MG/DL (ref 8.5–10.5)
CHLORIDE SERPL-SCNC: 107 MMOL/L (ref 96–112)
CO2 SERPL-SCNC: 31 MMOL/L (ref 20–33)
CREAT SERPL-MCNC: 0.86 MG/DL (ref 0.5–1.4)
EOSINOPHIL # BLD AUTO: 0 K/UL (ref 0–0.51)
EOSINOPHIL NFR BLD: 0 % (ref 0–6.9)
ERYTHROCYTE [DISTWIDTH] IN BLOOD BY AUTOMATED COUNT: 54.9 FL (ref 35.9–50)
GFR SERPLBLD CREATININE-BSD FMLA CKD-EPI: 82 ML/MIN/1.73 M 2
GLOBULIN SER CALC-MCNC: 2.9 G/DL (ref 1.9–3.5)
GLUCOSE SERPL-MCNC: 126 MG/DL (ref 65–99)
HCT VFR BLD AUTO: 43 % (ref 37–47)
HGB BLD-MCNC: 13.3 G/DL (ref 12–16)
IMM GRANULOCYTES # BLD AUTO: 0.09 K/UL (ref 0–0.11)
IMM GRANULOCYTES NFR BLD AUTO: 0.8 % (ref 0–0.9)
LV EJECT FRACT MOD 2C 99903: 68.38
LV EJECT FRACT MOD 4C 99902: 58.47
LV EJECT FRACT MOD BP 99901: 62.67
LYMPHOCYTES # BLD AUTO: 1.35 K/UL (ref 1–4.8)
LYMPHOCYTES NFR BLD: 11.3 % (ref 22–41)
MAGNESIUM SERPL-MCNC: 2.7 MG/DL (ref 1.5–2.5)
MCH RBC QN AUTO: 27.5 PG (ref 27–33)
MCHC RBC AUTO-ENTMCNC: 30.9 G/DL (ref 32.2–35.5)
MCV RBC AUTO: 88.8 FL (ref 81.4–97.8)
MONOCYTES # BLD AUTO: 0.65 K/UL (ref 0–0.85)
MONOCYTES NFR BLD AUTO: 5.5 % (ref 0–13.4)
NEUTROPHILS # BLD AUTO: 9.81 K/UL (ref 1.82–7.42)
NEUTROPHILS NFR BLD: 82.3 % (ref 44–72)
NRBC # BLD AUTO: 0 K/UL
NRBC BLD-RTO: 0 /100 WBC (ref 0–0.2)
PLATELET # BLD AUTO: 252 K/UL (ref 164–446)
PMV BLD AUTO: 11.5 FL (ref 9–12.9)
POTASSIUM SERPL-SCNC: 4.7 MMOL/L (ref 3.6–5.5)
PROT SERPL-MCNC: 6.5 G/DL (ref 6–8.2)
RBC # BLD AUTO: 4.84 M/UL (ref 4.2–5.4)
SODIUM SERPL-SCNC: 148 MMOL/L (ref 135–145)
WBC # BLD AUTO: 11.9 K/UL (ref 4.8–10.8)

## 2024-05-21 PROCEDURE — 93306 TTE W/DOPPLER COMPLETE: CPT | Mod: 26 | Performed by: INTERNAL MEDICINE

## 2024-05-21 PROCEDURE — RXMED WILLOW AMBULATORY MEDICATION CHARGE: Performed by: STUDENT IN AN ORGANIZED HEALTH CARE EDUCATION/TRAINING PROGRAM

## 2024-05-21 PROCEDURE — 99232 SBSQ HOSP IP/OBS MODERATE 35: CPT | Performed by: STUDENT IN AN ORGANIZED HEALTH CARE EDUCATION/TRAINING PROGRAM

## 2024-05-21 RX ORDER — GUAIFENESIN 200 MG/10ML
10 LIQUID ORAL EVERY 4 HOURS PRN
Qty: 840 ML | Refills: 0 | Status: SHIPPED | OUTPATIENT
Start: 2024-05-21

## 2024-05-21 RX ORDER — PREDNISONE 20 MG/1
40 TABLET ORAL DAILY
Qty: 4 TABLET | Refills: 0 | Status: SHIPPED | OUTPATIENT
Start: 2024-05-21 | End: 2024-05-24

## 2024-05-21 RX ORDER — BENZONATATE 100 MG/1
100 CAPSULE ORAL 3 TIMES DAILY PRN
Qty: 60 CAPSULE | Refills: 0 | Status: SHIPPED | OUTPATIENT
Start: 2024-05-21

## 2024-05-21 RX ADMIN — MOMETASONE FUROATE AND FORMOTEROL FUMARATE DIHYDRATE 2 PUFF: 50; 5 AEROSOL RESPIRATORY (INHALATION) at 17:26

## 2024-05-21 RX ADMIN — ARIPIPRAZOLE 30 MG: 15 TABLET ORAL at 04:28

## 2024-05-21 RX ADMIN — APIXABAN 5 MG: 5 TABLET, FILM COATED ORAL at 04:27

## 2024-05-21 RX ADMIN — HUMAN ALBUMIN MICROSPHERES AND PERFLUTREN 3 ML: 10; .22 INJECTION, SOLUTION INTRAVENOUS at 11:36

## 2024-05-21 RX ADMIN — FUROSEMIDE 20 MG: 10 INJECTION INTRAMUSCULAR; INTRAVENOUS at 04:28

## 2024-05-21 RX ADMIN — IPRATROPIUM BROMIDE AND ALBUTEROL SULFATE 3 ML: 2.5; .5 SOLUTION RESPIRATORY (INHALATION) at 04:44

## 2024-05-21 RX ADMIN — METOPROLOL SUCCINATE 25 MG: 25 TABLET, EXTENDED RELEASE ORAL at 04:27

## 2024-05-21 RX ADMIN — NICOTINE 14 MG: 14 PATCH TRANSDERMAL at 04:27

## 2024-05-21 RX ADMIN — MOMETASONE FUROATE AND FORMOTEROL FUMARATE DIHYDRATE 2 PUFF: 50; 5 AEROSOL RESPIRATORY (INHALATION) at 04:28

## 2024-05-21 RX ADMIN — PREDNISONE 40 MG: 20 TABLET ORAL at 04:27

## 2024-05-21 RX ADMIN — AZITHROMYCIN DIHYDRATE 500 MG: 250 TABLET, FILM COATED ORAL at 04:27

## 2024-05-21 RX ADMIN — APIXABAN 5 MG: 5 TABLET, FILM COATED ORAL at 17:25

## 2024-05-21 RX ADMIN — MONTELUKAST 10 MG: 10 TABLET, FILM COATED ORAL at 17:25

## 2024-05-21 RX ADMIN — ATORVASTATIN CALCIUM 20 MG: 20 TABLET, FILM COATED ORAL at 20:30

## 2024-05-21 ASSESSMENT — ENCOUNTER SYMPTOMS
EYES NEGATIVE: 1
CARDIOVASCULAR NEGATIVE: 1
PSYCHIATRIC NEGATIVE: 1
GASTROINTESTINAL NEGATIVE: 1
RESPIRATORY NEGATIVE: 1
NEUROLOGICAL NEGATIVE: 1
MUSCULOSKELETAL NEGATIVE: 1
CONSTITUTIONAL NEGATIVE: 1

## 2024-05-21 ASSESSMENT — FIBROSIS 4 INDEX: FIB4 SCORE: 0.61

## 2024-05-21 NOTE — DISCHARGE PLANNING
Case Management Discharge Planning    Admission Date: 5/19/2024  GMLOS: 3.5  ALOS: 1    6-Clicks ADL Score: 19  6-Clicks Mobility Score: 18      Anticipated Discharge Dispo: Discharge Disposition: Discharged to home/self care (01)    DME Needed: Yes    DME Ordered: Yes  Home Oxygen    Action(s) Taken: Updated Provider/Nurse on Discharge Plan, DC Assessment Complete (See below), Choice obtained, and Referral(s) sent    0900, RN CM obtained Home O2 DME choice and was fax to [facility]. Pt uses 2 LPM at night. Pt has O2 concentrator. Pt is with [employer].        Escalations Completed: None    Medically Clear: No    Next Steps: CM will continue to assist Pt with discharge needs.      Barriers to Discharge: Medical clearance    Care Transition Team Assessment  RN CM met with Pt at bedside to obtain assessment informations. Demographics verified. RN CM introduced self and purpose of visit.   Pt lives in [facility].  Pt has caregivers for support.  Pt said has a PCP from [facility] but cannot recall the name.  Pt was independent with ADLs prior to hospitalization. Pt uses 4WW.  Pt has O2 concentrator at home through [employer]. Pt uses 2 LPM at night only.        Information Source  Orientation Level: Oriented X4  Information Given By: Patient  Who is responsible for making decisions for patient? : Patient      Elopement Risk  Legal Hold: No  Ambulatory or Self Mobile in Wheelchair: Yes  Disoriented: No  Psychiatric Symptoms: None  History of Wandering: No  Elopement this Admit: No  Vocalizing Wanting to Leave: No  Displays Behaviors, Body Language Wanting to Leave: No-Not at Risk for Elopement  Elopement Risk: Not at Risk for Elopement    Interdisciplinary Discharge Planning  Primary Care Physician: Pt has a PCP from [facility]  Lives with - Patient's Self Care Capacity: Attendant / Paid Care Giver  Patient or legal guardian wants to designate a caregiver: No  Support Systems: Other (Comments)  (Caregiver at )  Housing / Facility: [facility]  Name of Care Facility: [facility]  Prior Services: Other (Comments)  Durable Medical Equipment: Walker, Home Oxygen (Pt uses 2 LPM at night)  DME Provider / Phone: Saundra    Discharge Preparedness  What is your plan after discharge?: Home with help  What are your discharge supports?: Other (comment) (Caregivers at Group home)  Prior Functional Level: Independent with Activities of Daily Living, Independent with Medication Management, Uses Walker    Functional Assesment  Prior Functional Level: Independent with Activities of Daily Living, Independent with Medication Management, Uses Walker    Finances  Financial Barriers to Discharge: No  Prescription Coverage: Yes    Vision / Hearing Impairment  Vision Impairment : Yes  Right Eye Vision: Wears Glasses  Left Eye Vision: Wears Glasses  Hearing Impairment : No       Domestic Abuse  Have you ever been the victim of abuse or violence?: No  Possible Abuse/Neglect Reported to:: Not Applicable    Psychological Assessment  History of Substance Abuse: None  History of Psychiatric Problems: No         Anticipated Discharge Information  Discharge Disposition: Discharged to home/self care (01)

## 2024-05-21 NOTE — PROGRESS NOTES
[facility] Medicine Daily Progress Note    Date of Service  5/21/2024    Chief Complaint  Lorene Haro is a 48 y.o. female admitted 5/19/2024 with dyspnea    Hospital Course  48 y.o. female who presented 5/19/2024 with complaints of weakness, tiredness, dyspnea and feeling generally unwell. Patient has a past medical history notable for reactive airway disease, KATHIA, paroxysmal afib, COPD, htn, and schizophrenia. Patient is on baseline 2L supplementary oxygen by nasal cannula at bedtime. Patient was seen recently in the ED on 5/6/24 with similar complaints of dyspnea, increased sputum production and was discharged from the ED with albuterol, prednisone 20mg for 5 days and doxycycline BID 7 days. Patient reports her symptoms improved after completing treatment. She was recently at her sister's house and stayed overnight. Her sister has 2 dogs and is a smoker. Patient reports feeling very tired and weak Saturday (day prior to presentation) and reports going back to her group home and going to sleep right away with use of her 2L nc supplementary oxygen. She reports that when she awoke still feeling unwell. She reports that while she was laying in her recliner, she felt she had an elephant on her chest and it was difficult to breath. She denies any f/c, chest pain, abdominal pain, vomiting, diarrhea or constipation. She endorses cough, productive of white sputum for 2 days, and nausea. She mentions when she went to see a physician about a week ago she passed a bloody bowel movement noting bright red blood in the toilet. She endorses dyschezia. She denies ever having a colonoscopy however did have a cologuard this year which was negative. Patient reports she was scared to tell the medical provider about the bloody bowel movement. She denies any recent sick contacts. She also has complaints of neuropathy of bilateral feet secondary to edema.     ED course:   NT-proBNP 417; respiratory pcr viral panel flu/covid/rsv  negative; cmp notable for elevated alk phos of 125. Was administered ceftriaxone, azithromycin, duonebs, 40mg lasix, 125mg IV methylprednisolone; chest xray revealed no acute   cardiopulmonary disease, cardiomegaly, atherosclerosis.     5/21/2024 echocardiogram showing moderate concentric LVH, EF 60 to 65%.  And RV systolic pressure at 35    Interval Problem Update  Reporting improvement with dyspnea  Stable vitals  On 1 L nasal cannula at rest and requiring 2 L on ambulation  Chemistry with mild hypernatremia  Continue diuresis with Lasix  Up to chair for all meals  Titrate O2 as tolerable  Labs on AM    I have discussed this patient's plan of care and discharge plan at IDT rounds today with Case Management, Nursing, Nursing leadership, and other members of the IDT team.    Consultants/Specialty  Pulmonology    Code Status  Full Code    Disposition  The patient is not medically cleared for discharge to home or a post-acute facility.  Anticipate discharge to: home with close outpatient follow-up    I have placed the appropriate orders for post-discharge needs.    Review of Systems  Review of Systems   Constitutional: Negative.    HENT: Negative.     Eyes: Negative.    Respiratory: Negative.     Cardiovascular: Negative.    Gastrointestinal: Negative.    Genitourinary: Negative.    Musculoskeletal: Negative.    Skin: Negative.    Neurological: Negative.    Endo/Heme/Allergies: Negative.    Psychiatric/Behavioral: Negative.          Physical Exam  Temp:  [36.5 °C (97.7 °F)-36.8 °C (98.2 °F)] 36.5 °C (97.7 °F)  Pulse:  [62-82] 62  Resp:  [16-21] 21  BP: (109-118)/(54-70) 112/63  SpO2:  [92 %-97 %] 95 %    Physical Exam  Constitutional:       Appearance: Normal appearance.   HENT:      Head: Normocephalic and atraumatic.      Mouth/Throat:      Mouth: Mucous membranes are moist.   Eyes:      Extraocular Movements: Extraocular movements intact.      Pupils: Pupils are equal, round, and reactive to light.    Cardiovascular:      Rate and Rhythm: Normal rate and regular rhythm.      Pulses: Normal pulses.      Heart sounds: Normal heart sounds.   Pulmonary:      Effort: Pulmonary effort is normal.      Breath sounds: Normal breath sounds.      Comments: Distant breath sounds  Abdominal:      General: Bowel sounds are normal.      Palpations: Abdomen is soft.   Musculoskeletal:         General: No swelling. Normal range of motion.      Cervical back: Normal range of motion and neck supple.      Right lower leg: Edema present.      Left lower leg: Edema present.   Skin:     General: Skin is warm.      Coloration: Skin is not jaundiced.   Neurological:      General: No focal deficit present.      Mental Status: She is alert and oriented to person, place, and time. Mental status is at baseline.      Cranial Nerves: No cranial nerve deficit.   Psychiatric:         Mood and Affect: Mood normal.         Behavior: Behavior normal.         Thought Content: Thought content normal.         Judgment: Judgment normal.         Fluids    Intake/Output Summary (Last 24 hours) at 5/21/2024 0741  Last data filed at 5/21/2024 0600  Gross per 24 hour   Intake 560 ml   Output 1575 ml   Net -1015 ml       Laboratory  Recent Labs     05/19/24 2206 05/21/24  0341   WBC 10.2 11.9*   RBC 4.89 4.84   HEMOGLOBIN 13.5 13.3   HEMATOCRIT 43.1 43.0   MCV 88.1 88.8   MCH 27.6 27.5   MCHC 31.3* 30.9*   RDW 53.4* 54.9*   PLATELETCT 253 252   MPV 11.4 11.5     Recent Labs     05/19/24 2206 05/21/24  0341   SODIUM 145 148*   POTASSIUM 4.6 4.7   CHLORIDE 108 107   CO2 28 31   GLUCOSE 134* 126*   BUN 10 16   CREATININE 0.95 0.86   CALCIUM 9.4 9.7                   Imaging  EC-ECHOCARDIOGRAM COMPLETE W/ CONT   Final Result      DX-CHEST-PORTABLE (1 VIEW)   Final Result         1.  No acute cardiopulmonary disease.   2.  Cardiomegaly   3.  Atherosclerosis           Assessment/Plan  * Acute hypoxemic respiratory failure (HCC)- (present on  admission)  Assessment & Plan  Was administered 40mg IV lasix; chest xray revealing no acute cardiopulmonary disease, cardiomegaly, and atherosclerosis; on 2L baseline O2 at bedtime; may be having acute hypoxemic respiratory failure secondary to copd exacerbation  -continue azithromycin for 3 days total  -continue prednisone 40mg oral for a total of 5 days treatment with steroids  -RT protocol  -duonebs  -dulera  -resume home montelukast, flonase  -day team to consider continuing ceftriaxone, ordering extended respiratory panel by pcr, consider pulmonology consult, consider pulmonary rehab upon discharge     Morbid obesity (HCC)- (present on admission)  Assessment & Plan  BMI > 50; bmi 54.84  -outpatient follow up with pcp    Blood in stool- (present on admission)  Assessment & Plan  Patient on apixaban bid for paroxysmal afib, reports having bloody bowel movement with dyschezia about 1 week ago with slow resolution.   -continue to monitor   -monitor daily cbc   -consider gi consult     ?possible Schizophrenia vs Bipolar?- (present on admission)  Assessment & Plan  -resume home aripiprazole       Paroxysmal atrial fibrillation (HCC)- (present on admission)  Assessment & Plan  -continue home apixaban bid and metoprolol     Tobacco abuse, in remission- (present on admission)  Assessment & Plan  Smoking 1ppd since age 16; quit 1 week ago  -nicotine replacement with nicotine patch 14mg, lozenge and gum        VTE prophylaxis: Eliquis    I have performed a physical exam and reviewed and updated ROS and Plan today (5/21/2024). In review of yesterday's note (5/20/2024), there are no changes except as documented above.

## 2024-05-21 NOTE — CARE PLAN
The patient is Stable - Low risk of patient condition declining or worsening    Shift Goals  Clinical Goals: fall free  Patient Goals: up tp chair  Family Goals: megan    Progress made toward(s) clinical / shift goals:  goals met    Patient is not progressing towards the following goals:    Problem: Knowledge Deficit - COPD  Goal: Patient/significant other demonstrates understanding of disease process, utilization of the Action Plan, medications and discharge instruction  Outcome: Progressing     Problem: Risk for Infection - COPD  Goal: Patient will remain free from signs and symptoms of infection  Outcome: Progressing     Problem: Nutrition - Advanced  Goal: Patient will display progressive weight gain toward goal have adequate food and fluid intake  Outcome: Progressing     Problem: Ineffective Airway Clearance  Goal: Patient will maintain patent airway with clear/clearing breath sounds  Outcome: Progressing     Problem: Impaired Gas Exchange  Goal: Patient will demonstrate improved ventilation and adequate oxygenation and participate in treatment regimen within the level of ability/situation.  Outcome: Progressing     Problem: Risk for Aspiration  Goal: Patient's risk for aspiration will be absent or decrease  Outcome: Progressing     Problem: Self Care  Goal: Patient will have the ability to perform ADLs independently or with assistance (bathe, groom, dress, toilet and feed)  Outcome: Progressing     Problem: Skin Integrity  Goal: Skin integrity is maintained or improved  Outcome: Progressing     Problem: Fall Risk  Goal: Patient will remain free from falls  Outcome: Progressing     Problem: Knowledge Deficit - Standard  Goal: Patient and family/care givers will demonstrate understanding of plan of care, disease process/condition, diagnostic tests and medications  Outcome: Progressing     Problem: Pain - Standard  Goal: Alleviation of pain or a reduction in pain to the patient’s comfort goal  Outcome:  Progressing

## 2024-05-21 NOTE — DISCHARGE PLANNING
0907  Received Choice form at 0901  Agency/Facility Name: [facility]   Referral sent per Choice form @ Unable to send need, need DME order.

## 2024-05-21 NOTE — THERAPY
"Occupational Therapy   Initial Evaluation     Patient Name: Lorene Haro  Age:  48 y.o., Sex:  female  Medical Record #: [id number]  Today's Date: 5/20/2024     Precautions  Precautions: Fall Risk    Assessment    Patient is 48 y.o. female admitted with hypoxic respiratory failure with complaints of weakness, tiredness, and dyspnea. Other pertinent medical history includes but is not limited to reactive airway disease, KATHIA, a-fib, COPD, HTN, and possible schizophrenia vs bipolar. Pt seen for OT evaluation. Pt required SBA-min A for functional mobility, bed mobility, toilet hygiene, and standing handwashing. Pt lives in a group home for a few days a week and stays with her sister the other days of the week. She has support for ADLs and IADLs as needed. Pt current functional performance limited by impaired activity tolerance, generalized weakness, and impaired cognition. Pt will benefit from skilled OT while admitted to acute care.     Plan    Occupational Therapy Initial Treatment Plan   Treatment Interventions: Self Care / Activities of Daily Living, Adaptive Equipment, Neuro Re-Education / Balance, Therapeutic Exercises, Therapeutic Activity  Treatment Frequency: 3 Times per Week  Duration: Until Therapy Goals Met    DC Equipment Recommendations: Unable to determine at this time  Discharge Recommendations: Other - (return to group home)     Subjective    \"When I sit up, I just feel like 'Weeee!!'\"     Objective     05/20/24 1704   Prior Living Situation   Prior Services Other (Comments)   Housing / Facility Group Home   Steps Into Home 0   Steps In Home 0   Bathroom Set up Bathtub / Shower Combination;Shower Chair;Grab Bars   Equipment Owned 4-Wheel Walker;Oxygen;Tub / Shower Seat   Lives with - Patient's Self Care Capacity Attendant / Paid Care Giver   Comments Pt lives in the group home 3 days a week and with her sister the rest of the week.   Prior Level of ADL Function   Self Feeding Independent   Grooming / " Hygiene Independent   Bathing Requires Assist   Dressing Requires Assist   Toileting Independent   Prior Level of IADL Function   Medication Management Requires Assist   Laundry Requires Assist   Home Management Requires Assist   Shopping Dependent   Prior Level Of Mobility Independent With Device in Home   Driving / Transportation Relatives / Others Provide Transportation   History of Falls   History of Falls   (denied history of falls)   Precautions   Precautions Fall Risk   Vitals   Vitals Comments Vitals pre session HR 80, SpO2 93@2L, /66 and at end of session HR 84, SpO2 91@2L, /68   Pain   Pain Scales 0 to 10 Scale    Pain 0 - 10 Group   Therapist Pain Assessment Post Activity Pain Same as Prior to Activity;Nurse Notified  (no pain reported, agreeable to session)   Non Verbal Descriptors   Non Verbal Scale  Calm   Cognition    Cognition / Consciousness X   New Learning Impaired   Attention Impaired   Comments very pleasant and cooperative   Active ROM Upper Body   Comments L UE limited by IV placement, otherwise WFL   Strength Upper Body   Comments grossly 3+/5 bilaterally   Sensation Upper Body   Upper Extremity Sensation  WDL   Upper Body Muscle Tone   Upper Body Muscle Tone  WDL   Coordination Upper Body   Comments tremor noted in B UE, reported plans to make appointment with primary care doctor to address this   Balance Assessment   Sitting Balance (Static) Fair   Sitting Balance (Dynamic) Fair   Standing Balance (Static) Fair -   Standing Balance (Dynamic) Fair -   Weight Shift Sitting Fair   Weight Shift Standing Fair   Comments w/ FWW   Bed Mobility    Supine to Sit Standby Assist   Sit to Supine   (up to chair post)   Scooting Supervised   Rolling Supervised   Comments HOB slightly elevated   ADL Assessment   Grooming Minimal Assist;Standing  (washed hands at sink)   Toileting Standby Assist  (urine on toilet)   Functional Mobility   Sit to Stand Contact Guard Assist   Bed, Chair,  Wheelchair Transfer Contact Guard Assist   Toilet Transfers Contact Guard Assist   Transfer Method Stand Step   Mobility EOB>bathroom>EOB>chair   Comments w/ FWW   Visual Perception   Visual Perception  Not Tested   Activity Tolerance   Sitting in Chair up to chair post   Sitting Edge of Bed <5 min   Standing ~5 min   Comments limited by increased work of breathing and fatigue   Patient / Family Goals   Patient / Family Goal #1 to go home   Short Term Goals   Short Term Goal # 1 Pt will perform LB dressnig w/ supv   Short Term Goal # 2 Pt will perform standing g/h w/ supv   Short Term Goal # 3 Pt will perform ADL transfer w/ supv   Education Group   Education Provided Role of Occupational Therapist;Activities of Daily Living;Pathology of bedrest;Energy Conservation   Role of Occupational Therapist Patient Response Patient;Acceptance;Explanation;Verbal Demonstration   Energy Conservation Patient Response Patient;Acceptance;Explanation;Verbal Demonstration   ADL Patient Response Patient;Acceptance;Explanation;Demonstration;Verbal Demonstration;Action Demonstration   Pathology of Bedrest Patient Response Patient;Acceptance;Explanation;Demonstration;Verbal Demonstration;Action Demonstration   Occupational Therapy Initial Treatment Plan    Treatment Interventions Self Care / Activities of Daily Living;Adaptive Equipment;Neuro Re-Education / Balance;Therapeutic Exercises;Therapeutic Activity   Treatment Frequency 3 Times per Week   Duration Until Therapy Goals Met   Problem List   Problem List Decreased Active Daily Living Skills;Decreased Homemaking Skills;Decreased Functional Mobility;Decreased Activity Tolerance;Impaired Coordination Left Upper Extremity;Impaired Coordination Right Upper Extremity;Impaired Cognitive Function;Impaired Postural Control / Balance

## 2024-05-21 NOTE — CARE PLAN
The patient is Stable - Low risk of patient condition declining or worsening    Shift Goals  Clinical Goals: hemodynamic stability  Patient Goals: rest  Family Goals: megan    Progress made toward(s) clinical / shift goals:    Problem: Knowledge Deficit - COPD  Goal: Patient/significant other demonstrates understanding of disease process, utilization of the Action Plan, medications and discharge instruction  Outcome: Progressing     Problem: Risk for Infection - COPD  Goal: Patient will remain free from signs and symptoms of infection  Outcome: Progressing     Problem: Impaired Gas Exchange  Goal: Patient will demonstrate improved ventilation and adequate oxygenation and participate in treatment regimen within the level of ability/situation.  Outcome: Progressing     Problem: Skin Integrity  Goal: Skin integrity is maintained or improved  Outcome: Progressing     Problem: Fall Risk  Goal: Patient will remain free from falls  Outcome: Progressing       Patient is not progressing towards the following goals:

## 2024-05-22 ENCOUNTER — PHARMACY VISIT (OUTPATIENT)
Dept: PHARMACY | Facility: MEDICAL CENTER | Age: 49
End: 2024-05-22
Payer: COMMERCIAL

## 2024-05-22 VITALS
BODY MASS INDEX: 50.63 KG/M2 | HEIGHT: 62 IN | DIASTOLIC BLOOD PRESSURE: 52 MMHG | SYSTOLIC BLOOD PRESSURE: 114 MMHG | OXYGEN SATURATION: 94 % | TEMPERATURE: 98.1 F | HEART RATE: 66 BPM | RESPIRATION RATE: 18 BRPM | WEIGHT: 275.13 LBS

## 2024-05-22 LAB
ALBUMIN SERPL BCP-MCNC: 3.6 G/DL (ref 3.2–4.9)
ALBUMIN/GLOB SERPL: 1.4 G/DL
ALP SERPL-CCNC: 98 U/L (ref 30–99)
ALT SERPL-CCNC: 13 U/L (ref 2–50)
ANION GAP SERPL CALC-SCNC: 8 MMOL/L (ref 7–16)
AST SERPL-CCNC: 9 U/L (ref 12–45)
BASOPHILS # BLD AUTO: 0.2 % (ref 0–1.8)
BASOPHILS # BLD: 0.02 K/UL (ref 0–0.12)
BILIRUB SERPL-MCNC: 0.5 MG/DL (ref 0.1–1.5)
BUN SERPL-MCNC: 19 MG/DL (ref 8–22)
CALCIUM ALBUM COR SERPL-MCNC: 9.6 MG/DL (ref 8.5–10.5)
CALCIUM SERPL-MCNC: 9.3 MG/DL (ref 8.5–10.5)
CHLORIDE SERPL-SCNC: 102 MMOL/L (ref 96–112)
CO2 SERPL-SCNC: 31 MMOL/L (ref 20–33)
CREAT SERPL-MCNC: 0.94 MG/DL (ref 0.5–1.4)
EOSINOPHIL # BLD AUTO: 0.02 K/UL (ref 0–0.51)
EOSINOPHIL NFR BLD: 0.2 % (ref 0–6.9)
ERYTHROCYTE [DISTWIDTH] IN BLOOD BY AUTOMATED COUNT: 54.8 FL (ref 35.9–50)
GFR SERPLBLD CREATININE-BSD FMLA CKD-EPI: 74 ML/MIN/1.73 M 2
GLOBULIN SER CALC-MCNC: 2.5 G/DL (ref 1.9–3.5)
GLUCOSE SERPL-MCNC: 102 MG/DL (ref 65–99)
HCT VFR BLD AUTO: 41.6 % (ref 37–47)
HGB BLD-MCNC: 13.1 G/DL (ref 12–16)
IMM GRANULOCYTES # BLD AUTO: 0.04 K/UL (ref 0–0.11)
IMM GRANULOCYTES NFR BLD AUTO: 0.4 % (ref 0–0.9)
LYMPHOCYTES # BLD AUTO: 2.82 K/UL (ref 1–4.8)
LYMPHOCYTES NFR BLD: 27.8 % (ref 22–41)
MCH RBC QN AUTO: 27.9 PG (ref 27–33)
MCHC RBC AUTO-ENTMCNC: 31.5 G/DL (ref 32.2–35.5)
MCV RBC AUTO: 88.7 FL (ref 81.4–97.8)
MONOCYTES # BLD AUTO: 0.71 K/UL (ref 0–0.85)
MONOCYTES NFR BLD AUTO: 7 % (ref 0–13.4)
NEUTROPHILS # BLD AUTO: 6.52 K/UL (ref 1.82–7.42)
NEUTROPHILS NFR BLD: 64.4 % (ref 44–72)
NRBC # BLD AUTO: 0 K/UL
NRBC BLD-RTO: 0 /100 WBC (ref 0–0.2)
PLATELET # BLD AUTO: 215 K/UL (ref 164–446)
PMV BLD AUTO: 11.3 FL (ref 9–12.9)
POTASSIUM SERPL-SCNC: 3.9 MMOL/L (ref 3.6–5.5)
PROT SERPL-MCNC: 6.1 G/DL (ref 6–8.2)
RBC # BLD AUTO: 4.69 M/UL (ref 4.2–5.4)
SODIUM SERPL-SCNC: 141 MMOL/L (ref 135–145)
WBC # BLD AUTO: 10.1 K/UL (ref 4.8–10.8)

## 2024-05-22 PROCEDURE — 99239 HOSP IP/OBS DSCHRG MGMT >30: CPT | Performed by: STUDENT IN AN ORGANIZED HEALTH CARE EDUCATION/TRAINING PROGRAM

## 2024-05-22 RX ADMIN — APIXABAN 5 MG: 5 TABLET, FILM COATED ORAL at 04:29

## 2024-05-22 RX ADMIN — NICOTINE 14 MG: 14 PATCH TRANSDERMAL at 04:29

## 2024-05-22 RX ADMIN — FUROSEMIDE 20 MG: 10 INJECTION INTRAMUSCULAR; INTRAVENOUS at 04:30

## 2024-05-22 RX ADMIN — MOMETASONE FUROATE AND FORMOTEROL FUMARATE DIHYDRATE 2 PUFF: 50; 5 AEROSOL RESPIRATORY (INHALATION) at 04:29

## 2024-05-22 RX ADMIN — ARIPIPRAZOLE 30 MG: 15 TABLET ORAL at 04:30

## 2024-05-22 RX ADMIN — METOPROLOL SUCCINATE 25 MG: 25 TABLET, EXTENDED RELEASE ORAL at 04:29

## 2024-05-22 RX ADMIN — PREDNISONE 40 MG: 20 TABLET ORAL at 04:30

## 2024-05-22 ASSESSMENT — FIBROSIS 4 INDEX: FIB4 SCORE: 0.58

## 2024-05-22 NOTE — PROGRESS NOTES
Monitor summary:  SB/SR 54-75  PAC, PVC, coup, 3/4 beats idioventricular, PAF (30 sec)    .16/.09/.40

## 2024-05-22 NOTE — FACE TO FACE
"Face to Face Note  -  Durable Medical Equipment    Rob Frost M.D. - NPI: [id number]  I certify that this patient is under my care and that they had a durable medical equipment(DME)face to face encounter by myself that meets the physician DME face-to-face encounter requirements with this patient on:    Date of encounter:   Patient:                    MRN:                       YOB: 2026  Lorene Haro  [id number]  1975     The encounter with the patient was in whole, or in part, for the following medical condition, which is the primary reason for durable medical equipment:  CHF and COPD    I certify that, based on my findings, the following durable medical equipment is medically necessary:    Oxygen   HOME O2 Saturation Measurements:(Values must be present for Home Oxygen orders)  Room air sat at rest: 88  Room air sat with amb: 81  With liters of O2: 1, O2 sat at rest with O2: 93  With Liters of O2: 1, O2 sat with amb with O2 : 90  Is the patient mobile?: Yes  If patient feels more short of breath, they can go up to 6 liters per minute and contact healthcare provider.    Supporting Symptoms: The patient requires supplemental oxygen, as the following interventions have been tried with limited or no improvement: \"Incentive spirometry.    My Clinical findings support the need for the above equipment due to:  Hypoxia  "

## 2024-05-22 NOTE — DISCHARGE PLANNING
0920  DPA hard fax updated O2 order to [facility] [place] per choice form obtained from [email or web address].     1032  Agency/Facility Name: [facility]   Spoke To: Oscar   Outcome: DPA called to verify order received, per Oscar was not received and requested for order to be refaxed.     1033  DPA re faxed order to [facility] [place] via hard fax.     1149  Agency/Facility Name: [facility]   Spoke To: Angelita   Outcome: DPA called to verify order received, per Angelita order received it is now on the way to be delivered. NEREIDA Paez notified.

## 2024-05-22 NOTE — DISCHARGE SUMMARY
Discharge Summary    CHIEF COMPLAINT ON ADMISSION  Chief Complaint   Patient presents with    Shortness of Breath    Cough    Bloody Stools       Reason for Admission  ems     Admission Date  5/19/2024    CODE STATUS  Full Code    HPI & HOSPITAL COURSE  48 y.o. female who presented 5/19/2024 with complaints of weakness, tiredness, dyspnea and feeling generally unwell. Patient has a past medical history notable for reactive airway disease, KATHIA on 2L nasal cannula at bedtime, paroxysmal afib, COPD, htn, and schizophrenia who was admitted on 5/19/2024 for acute on chronic hypoxic respiratory failure.  She was started on COPD management with inhaler treatments and systemic steroids.  Pulmonology following without etiology was likely more secondary to fluid overload.  Patient noted for fluid overload for which she was started on IV diuresis. 5/21/2024 echocardiogram showing moderate concentric LVH, EF 60 to 65% and RV systolic pressure at 35.  As per home oxygen evaluation, patient requiring 1 L nasal cannula continuously which was set up prior to discharge. Stable patient with in chronic condition was discharged home with home oxygen and instructed to follow-up with her primary care provider and pulmonology in the outpatient setting.      All results and plan of action discussed with the patient for she voiced understanding and agreement with the primary care team.  Patient was instructed to return to emergency department if symptoms were to worsen.    Therefore, she is discharged in good and stable condition to home with close outpatient follow-up.    The patient met 2-midnight criteria for an inpatient stay at the time of discharge.    Discharge Date  5/22/2024    FOLLOW UP ITEMS POST DISCHARGE  Primary care provider follow posthospital discharge care    DISCHARGE DIAGNOSES  Principal Problem:    Acute hypoxemic respiratory failure (HCC) (POA: Yes)  Active Problems:    Morbid obesity (HCC) (POA: Yes)    ?possible  Schizophrenia vs Bipolar? (POA: Yes)    Blood in stool (POA: Yes)    Tobacco abuse, in remission (POA: Yes)    Paroxysmal atrial fibrillation (HCC) (POA: Yes)  Resolved Problems:    * No resolved hospital problems. *      FOLLOW UP  Future Appointments   Date Time Provider Department Center   5/29/2024  9:00 AM Rodney Gallardo D.O. [facility]   5/30/2024  4:30 PM CRIST[facility]AL [facility] None   6/3/2024  2:45 PM PACER [facility] None   6/3/2024  3:20 PM Oz Borrego M.D. [facility] None   7/17/2024 11:15 AM [facility]AM [facility] None     No follow-up provider specified.    MEDICATIONS ON DISCHARGE     Medication List        START taking these medications        Instructions   benzonatate 100 MG Caps  Commonly known as: Tessalon   Take 1 Capsule by mouth 3 times a day as needed for Cough.  Dose: 100 mg     guaiFENesin 100 MG/5ML liquid  Commonly known as: Robitussin   Take 10 mL by mouth every four hours as needed for Cough.  Dose: 10 mL     predniSONE 20 MG Tabs  Commonly known as: Deltasone   Take 2 Tablets by mouth every day for 2 days.  Dose: 40 mg            CONTINUE taking these medications        Instructions   Advair Diskus 100-50 MCG/ACT Aepb  Generic drug: fluticasone-salmeterol   Inhale 1 Puff 2 times a day.  Dose: 1 Puff     apixaban 5mg Tabs  Commonly known as: Eliquis   Take 1 Tablet by mouth 2 times a day.  Dose: 5 mg     aripiprazole 30 MG tablet  Commonly known as: Abilify   Take 1 Tablet by mouth every morning. Indications: Major Depressive Disorder  Dose: 30 mg     atorvastatin 20 MG Tabs  Commonly known as: Lipitor   Take 1 Tablet by mouth at bedtime.  Dose: 20 mg     fluticasone 50 MCG/ACT nasal spray  Commonly known as: Flonase   Administer 2 Sprays into each nostril every morning.  Dose: 2 Spray     furosemide 20 MG Tabs  Commonly known as: Lasix   Take 1 Tablet by mouth as needed (Take 1 tablet daily as needed for swelling).  Dose: 20 mg     Home Care Oxygen   Inhale 2 L/min at  bedtime. DME [facility] (now Preferred)  Dose: 2 L/min     metoprolol SR 25 MG Tb24  Commonly known as: Toprol XL   Take 25 mg by mouth every day.  Dose: 25 mg     montelukast 10 MG Tabs  Commonly known as: Singulair   Take 10 mg by mouth every evening.  Dose: 10 mg              Allergies  Allergies   Allergen Reactions    Penicillin G Rash and Itching     pt reports that she gets a rash all over her body and gets itchy    Amoxicillin Rash and Itching     Tolerates cephalosporins, pt reports that she gets a rash all over her body and gets itchy       DIET  Orders Placed This Encounter   Procedures    Diet Order Diet: Regular     Standing Status:   Standing     Number of Occurrences:   1     Order Specific Question:   Diet:     Answer:   Regular [1]       ACTIVITY  As tolerated.  Weight bearing as tolerated    CONSULTATIONS  Pulmonology    PROCEDURES  None    LABORATORY  Lab Results   Component Value Date    SODIUM 141 05/22/2024    POTASSIUM 3.9 05/22/2024    CHLORIDE 102 05/22/2024    CO2 31 05/22/2024    GLUCOSE 102 (H) 05/22/2024    BUN 19 05/22/2024    CREATININE 0.94 05/22/2024    CREATININE 0.9 09/11/2008        Lab Results   Component Value Date    WBC 10.1 05/22/2024    HEMOGLOBIN 13.1 05/22/2024    HEMATOCRIT 41.6 05/22/2024    PLATELETCT 215 05/22/2024        Total time of the discharge process exceeds 32 minutes.

## 2024-05-23 ENCOUNTER — TELEPHONE (OUTPATIENT)
Dept: HEALTH INFORMATION MANAGEMENT | Facility: OTHER | Age: 49
End: 2024-05-23
Payer: MEDICAID

## 2024-05-30 ENCOUNTER — OFFICE VISIT (OUTPATIENT)
Dept: SLEEP MEDICINE | Facility: MEDICAL CENTER | Age: 49
End: 2024-05-30
Payer: MEDICAID

## 2024-05-30 VITALS
HEIGHT: 61 IN | WEIGHT: 275 LBS | SYSTOLIC BLOOD PRESSURE: 124 MMHG | OXYGEN SATURATION: 95 % | HEART RATE: 68 BPM | DIASTOLIC BLOOD PRESSURE: 76 MMHG | BODY MASS INDEX: 51.92 KG/M2

## 2024-05-30 DIAGNOSIS — J44.89 ASTHMA-COPD OVERLAP SYNDROME (HCC): ICD-10-CM

## 2024-05-30 DIAGNOSIS — J96.11 CHRONIC RESPIRATORY FAILURE WITH HYPOXIA (HCC): ICD-10-CM

## 2024-05-30 DIAGNOSIS — G47.33 OSA (OBSTRUCTIVE SLEEP APNEA): ICD-10-CM

## 2024-05-30 DIAGNOSIS — F17.201 TOBACCO ABUSE, IN REMISSION: ICD-10-CM

## 2024-05-30 DIAGNOSIS — R91.8 PULMONARY NODULES: ICD-10-CM

## 2024-05-30 DIAGNOSIS — E66.01 CLASS 3 SEVERE OBESITY DUE TO EXCESS CALORIES WITH BODY MASS INDEX (BMI) OF 50.0 TO 59.9 IN ADULT, UNSPECIFIED WHETHER SERIOUS COMORBIDITY PRESENT (HCC): ICD-10-CM

## 2024-05-30 RX ORDER — ALBUTEROL SULFATE 90 UG/1
2 AEROSOL, METERED RESPIRATORY (INHALATION) EVERY 6 HOURS PRN
Qty: 18 G | Refills: 11 | Status: SHIPPED | OUTPATIENT
Start: 2024-05-30

## 2024-05-30 RX ORDER — MONTELUKAST SODIUM 10 MG/1
10 TABLET ORAL EVERY EVENING
Qty: 90 TABLET | Refills: 3 | Status: SHIPPED | OUTPATIENT
Start: 2024-05-30

## 2024-05-30 RX ORDER — FLUTICASONE PROPIONATE AND SALMETEROL 50; 100 UG/1; UG/1
1 POWDER RESPIRATORY (INHALATION) 2 TIMES DAILY
Qty: 180 EACH | Refills: 3 | Status: SHIPPED | OUTPATIENT
Start: 2024-05-30

## 2024-05-30 ASSESSMENT — FIBROSIS 4 INDEX: FIB4 SCORE: 0.58

## 2024-05-30 ASSESSMENT — ENCOUNTER SYMPTOMS
HEADACHES: 0
DIZZINESS: 0
DIARRHEA: 0
VOMITING: 0
SINUS PAIN: 0
SPUTUM PRODUCTION: 0
CHILLS: 0
WEAKNESS: 0
FALLS: 0
PALPITATIONS: 0
FEVER: 0
COUGH: 0
SHORTNESS OF BREATH: 1
HEMOPTYSIS: 0
NAUSEA: 0
HEARTBURN: 0
WHEEZING: 0
MYALGIAS: 0
DIAPHORESIS: 0

## 2024-05-30 NOTE — ASSESSMENT & PLAN NOTE
PFTs in 2021 show a restrictive process FVC of 1.45 L or 46%, FEV1 1.24 L or 48%, FEV1/FVC 86%, %, TLC 78%, and DLCO 85% predicted, with a positive bronchodilator response.  CT chest does not show emphysema.  Patient is a recent former smoker.  She is currently on Advair 100, Singulair, and albuterol as needed.  Symptomatically, she is back to baseline.  --Continue Advair 100 and Singulair  --Continue albuterol as needed  --Repeat PFTs before next visit  --I did advise patient going back on Lasix due to lower extremity swelling and untreated KATHIA.

## 2024-05-30 NOTE — ASSESSMENT & PLAN NOTE
Due to obesity hypoventilation.  Using 2 LPM of oxygen at night due to not being able to obtain an AVAPS machine.  --Continue 2 LPM of oxygen at night until AVAPS machine can be obtained  -- Advised to monitor patient's oxygen saturations during the day to keep between 90-92%.  Patient may titrate oxygen to keep oxygen saturation within this range.

## 2024-05-30 NOTE — ASSESSMENT & PLAN NOTE
T chest in April 2024 does not show PE or emphysema but does show a 3 mm RML nodule, which is evident on the CT abdomen pelvis from July 2023.   -- Repeat CT chest in 1 year (4/2024) given the patient is high risk due to her smoking history.

## 2024-05-30 NOTE — ASSESSMENT & PLAN NOTE
Patient has a 20+ pack year history, quitting a week ago.  She is currently using patches.  She is motivated to continue abstinence.  --Congratulated and encouraged patient to continue abstinence

## 2024-05-30 NOTE — PROGRESS NOTES
Pulmonary Clinic Note    Date of Visit: 5/30/2024     Chief Complaint:  Chief Complaint   Patient presents with    Hospital Follow-up     HFV/CONSULT LAURA/DX:COPD/DC:5/22/24     HPI:   Lorene Haro is a very pleasant 47 y.o. year old female current  smoker (20+ pack-years, quit in 5/2024), with a PMHx of chronic back pain with sciatica, COVID-19 pneumonia in October 2021, atrial fibrillation, anxiety, secondary hypercoagulable state, lower extremity edema, bipolar/schizophrenia, obesity hypoventilation syndrome  who presented to the Pulmonary Clinic for a regular follow up. Last seen in the office on 1/26/2023 with myself.     Patient is followed by the pulmonary office for the asthma/COPD overlap.  PFTs in 2021 show a restrictive process FVC of 1.45 L or 46%, FEV1 1.24 L or 48%, FEV1/FVC 86%, %, TLC 78%, and DLCO 85% predicted, with a positive bronchodilator response.  CTA in September and May 2022 did not show any abnormalities.  Echocardiogram in March 2022 showed RVSP of 35 mmHg.  Patient does carry a diagnosis of KATHIA and is currently on IVAPS machine being followed by the sleep center.  She has frequent ER visits for chest pain and/or shortness of breath.  CT chest in April 2024 does not show PE or emphysema but does show a 3 mm RML nodule, which is evident on the CT abdomen pelvis from July 2023.  Echocardiogram in May 2024 shows moderate LV H and elevated RV systolic pressures at 35 mmHg.  The LV was normal size and had normal systolic function with a EF of 60-65%.  Normal diastolic function.    Interval Events:  5/20/2024-5/22/2024-  Patient was hospitalized for acute on chronic hypoxic respiratory failure.  The concern was for volume overload given this patient was noncompliant with IVAPS and HTN.  Patient did have elevated BNP.  Patient was treated with diuretics.  Echocardiogram showed elevated RVSP of 35 mmHg.  Patient is currently on 2 LPM of oxygen at night.    5/30/2024-  Patient states  her breathing has improved since her discharge.  Symptomatically, she states that she is short of breath with mMRC of 1-2, which is her baseline.  She denies any significant cough, sputum production, or wheezing.  Patient quit smoking 1 week ago and is using patches and is motivated to continue abstinence.  She is currently on Advair 100, Singulair, and albuterol as needed.  She does need refills on all of these.  She continues to use benefit from 2 LPM of oxygen at night and reports that she never received her AVAPS machine.    Exacerbations this year:  0     Current medication regimen:  Advair 100, singulair and albuterol    Oxygen use:   2 LPM on exertion and at night    MMRC Grade: 1-2    Past Medical History:   Diagnosis Date    A-fib ([facility])     MONI (acute kidney injury) ([facility]) 2023    Asthma     Bipolar 2 disorder ([facility])     Chickenpox     Chronic obstructive pulmonary disease ([facility])     Hypertension     On home oxygen therapy     Other psychological stress     Schizophrenic disorder ([facility])     Yeast infection of the skin 2021     Past Surgical History:   Procedure Laterality Date    CHOLECYSTECTOMY      COLECTOMY      HYSTERECTOMY LAPAROSCOPY      KNEE ARTHROSCOPY Left      Social History     Socioeconomic History    Marital status: Single     Spouse name: Not on file    Number of children: Not on file    Years of education: Not on file    Highest education level: Not on file   Occupational History    Not on file   Tobacco Use    Smoking status: Some Days     Current packs/day: 0.00     Types: Cigarettes     Last attempt to quit: 10/12/2022     Years since quittin.6    Smokeless tobacco: Never   Vaping Use    Vaping status: Never Used   Substance and Sexual Activity    Alcohol use: Not Currently    Drug use: Never    Sexual activity: Not Currently   Other Topics Concern    Not on file   Social History Narrative    ** Merged History Encounter **         From [place]     Social Determinants of Health      Financial Resource Strain: Medium Risk (5/4/2022)    Overall Financial Resource Strain (CARDIA)     Difficulty of Paying Living Expenses: Somewhat hard   Food Insecurity: No Food Insecurity (5/20/2024)    Hunger Vital Sign     Worried About Running Out of Food in the Last Year: Never true     Ran Out of Food in the Last Year: Never true   Transportation Needs: No Transportation Needs (5/20/2024)    PRAPARE - Transportation     Lack of Transportation (Medical): No     Lack of Transportation (Non-Medical): No   Physical Activity: Not on file   Stress: Not on file   Social Connections: Not on file   Intimate Partner Violence: Not At Risk (5/20/2024)    Humiliation, Afraid, Rape, and Kick questionnaire     Fear of Current or Ex-Partner: No     Emotionally Abused: No     Physically Abused: No     Sexually Abused: No   Housing Stability: High Risk (5/20/2024)    Housing Stability Vital Sign     Unable to Pay for Housing in the Last Year: No     Number of Places Lived in the Last Year: 4     Unstable Housing in the Last Year: No        Family History   Problem Relation Age of Onset    No Known Problems Mother     Cancer Sister      Current Outpatient Medications on File Prior to Visit   Medication Sig Dispense Refill    benzonatate (TESSALON) 100 MG Cap Take 1 Capsule by mouth 3 times a day as needed for Cough. 60 Capsule 0    guaiFENesin (ROBITUSSIN) 100 MG/5ML liquid Take 10 mL by mouth every four hours as needed for Cough. 840 mL 0    metoprolol SR (TOPROL XL) 25 MG TABLET SR 24 HR Take 25 mg by mouth every day.      furosemide (LASIX) 20 MG Tab Take 1 Tablet by mouth as needed (Take 1 tablet daily as needed for swelling). 90 Tablet 1    apixaban (ELIQUIS) 5mg Tab Take 1 Tablet by mouth 2 times a day. 200 Tablet 3    fluticasone (FLONASE) 50 MCG/ACT nasal spray Administer 2 Sprays into each nostril every morning.      aripiprazole (ABILIFY) 30 MG tablet Take 1 Tablet by mouth every morning. Indications: Major  "Depressive Disorder 30 Tablet 0    atorvastatin (LIPITOR) 20 MG Tab Take 1 Tablet by mouth at bedtime. 30 Tablet 0    Home Care Oxygen Inhale 2 L/min at bedtime. DME [facility] (now [facility])       No current facility-administered medications on file prior to visit.     Allergies: Penicillin g and Amoxicillin    ROS:   Review of Systems   Constitutional:  Negative for chills, diaphoresis, fever and malaise/fatigue.   HENT:  Negative for congestion and sinus pain.    Respiratory:  Positive for shortness of breath. Negative for cough, hemoptysis, sputum production and wheezing.    Cardiovascular:  Negative for chest pain, palpitations and leg swelling.   Gastrointestinal:  Negative for diarrhea, heartburn, nausea and vomiting.   Musculoskeletal:  Negative for falls and myalgias.   Neurological:  Negative for dizziness, weakness and headaches.     Vitals:  /76 (BP Location: Right arm, Patient Position: Sitting, BP Cuff Size: Adult)   Pulse 68   Ht 1.549 m (5' 1\")   Wt 125 kg (275 lb)   SpO2 95%     Physical Exam  Constitutional:       General: She is not in acute distress.     Appearance: Normal appearance. She is not ill-appearing, toxic-appearing or diaphoretic.   Cardiovascular:      Rate and Rhythm: Normal rate and regular rhythm.      Heart sounds: No murmur heard.     No friction rub. No gallop.   Pulmonary:      Effort: No respiratory distress.      Breath sounds: Normal breath sounds. No stridor. No wheezing, rhonchi or rales.   Musculoskeletal:         General: No swelling.      Right lower leg: No edema.      Left lower leg: No edema.   Skin:     General: Skin is warm.   Neurological:      General: No focal deficit present.      Mental Status: She is alert and oriented to person, place, and time.   Psychiatric:         Mood and Affect: Mood normal.         Behavior: Behavior normal.         Thought Content: Thought content normal.         Judgment: Judgment normal.         Laboratory Data:  PFTs: " (Date: 9/21/2021)-      Impression:  Baseline spirometry shows significant reduction in FVC at 1.45 L or 46% predicted and FEV1 at 1.24 L or 48% predicted with FEV1/FVC ratio of 86 suggesting restriction.  There is significant bronchodilator response with postbronchodilator FEV1 of 1.48 L or 58% predicted.  Total lung capacity is just below lower limits of normal at 3.61 L or 78% predicted.  Diffusion capacity is within normal limits at 85% predicted.  Pulmonary function testing shows mixed restrictive obstructive defect with significant bronchodilator response.  This could be consistent with COPD in a patient with morbid obesity.  Correlate clinically and with imaging    CTA Chest: (Date: 4/7/2024)-  Impression:  1.  Suboptimal exam related to respiratory motion of the segmental branches. No evidence of pulmonary embolism.  2.  Mild cardiomegaly.  3.  Right adrenal calcifications.  4.  3 mm right middle lobe pulmonary nodule    ECHO: (Date: 5/21/2024)-  Impression:  Compared to the prior study on 10/1/2022.   Moderate concentric LVH and   noted elevated RV systolic pressure.   Normal left ventricular chamber size. Moderate concentric left   ventricular hypertrophy. Normal left ventricular systolic function. The   left ventricular ejection fraction is visually estimated to be 60-65%.   Normal regional wall motion. Normal diastolic function.  Mild tricuspid regurgitation with elevated RV systolic pressure of 35   mmHg.      Assessment and Plan:    Problem List Items Addressed This Visit          Pulmonary/Sleep Medicine Problems    Asthma-COPD overlap syndrome (HCC)     PFTs in 2021 show a restrictive process FVC of 1.45 L or 46%, FEV1 1.24 L or 48%, FEV1/FVC 86%, %, TLC 78%, and DLCO 85% predicted, with a positive bronchodilator response.  CT chest does not show emphysema.  Patient is a recent former smoker.  She is currently on Advair 100, Singulair, and albuterol as needed.  Symptomatically, she is back to  baseline.  --Continue Advair 100 and Singulair  --Continue albuterol as needed  --Repeat PFTs before next visit  --I did advise patient going back on Lasix due to lower extremity swelling and untreated KATHIA.         Relevant Medications    ADVAIR DISKUS 100-50 MCG/ACT AEROSOL POWDER, BREATH ACTIVATED    montelukast (SINGULAIR) 10 MG Tab    albuterol 108 (90 Base) MCG/ACT Aero Soln inhalation aerosol    Other Relevant Orders    PULMONARY FUNCTION TESTS -Test requested: Complete Pulmonary Function Test    KATHIA (obstructive sleep apnea)     Diagnostic sleep study in 2018 shows very severe KATHIA with AHI of 130.  She underwent a iVAPS titration study in 2022 which showed that she benefited from iVAPS but unfortunately never was able to receive the machine for multiple reasons.  -- PSG titration for iVAPS.  Unfortunately her previous sleep study is greater than 88-year-old, so we will have her repeat her iVAPS titration.         Relevant Orders    Polysomnography Titration    Tobacco abuse, in remission     Patient has a 20+ pack year history, quitting a week ago.  She is currently using patches.  She is motivated to continue abstinence.  --Congratulated and encouraged patient to continue abstinence         Class 3 severe obesity in adult (HCC)     As above.            Other    Pulmonary nodules     T chest in April 2024 does not show PE or emphysema but does show a 3 mm RML nodule, which is evident on the CT abdomen pelvis from July 2023.   -- Repeat CT chest in 1 year (4/2024) given the patient is high risk due to her smoking history.         Chronic respiratory failure with hypoxia (HCC)     Due to obesity hypoventilation.  Using 2 LPM of oxygen at night due to not being able to obtain an AVAPS machine.  --Continue 2 LPM of oxygen at night until AVAPS machine can be obtained  -- Advised to monitor patient's oxygen saturations during the day to keep between 90-92%.  Patient may titrate oxygen to keep oxygen saturation  within this range.            Diagnostic studies have been reviewed with the patient.    Return in about 3 weeks (around 6/20/2024), or if symptoms worsen or fail to improve, for Sleep- 3 weeks PSG/SS results  &  Pulm- 6 months PFT results, with Gianni.     This note was generated using voice recognition software which has a chance of producing errors of grammar and possibly content.  I have made every reasonable attempt to find and correct any obvious errors, but it should be expected that some may not be found prior to finalization of this note.    Time spent in record review prior to patient arrival, reviewing results, and in face-to-face encounter totaled 35 min.  __________  HENRIETTA Wu  Pulmonary Medicine  [facility]

## 2024-05-30 NOTE — ASSESSMENT & PLAN NOTE
Diagnostic sleep study in 2018 shows very severe KATHIA with AHI of 130.  She underwent a iVAPS titration study in 2022 which showed that she benefited from iVAPS but unfortunately never was able to receive the machine for multiple reasons.  -- PSG titration for iVAPS.  Unfortunately her previous sleep study is greater than 88-year-old, so we will have her repeat her iVAPS titration.

## 2024-06-01 ENCOUNTER — NON-PROVIDER VISIT (OUTPATIENT)
Dept: CARDIOLOGY | Facility: MEDICAL CENTER | Age: 49
End: 2024-06-01
Payer: MEDICAID

## 2024-06-01 ENCOUNTER — HOSPITAL ENCOUNTER (EMERGENCY)
Facility: MEDICAL CENTER | Age: 49
End: 2024-06-01
Attending: STUDENT IN AN ORGANIZED HEALTH CARE EDUCATION/TRAINING PROGRAM
Payer: MEDICAID

## 2024-06-01 VITALS
BODY MASS INDEX: 51.92 KG/M2 | HEIGHT: 61 IN | WEIGHT: 275 LBS | OXYGEN SATURATION: 94 % | SYSTOLIC BLOOD PRESSURE: 120 MMHG | HEART RATE: 80 BPM | RESPIRATION RATE: 16 BRPM | TEMPERATURE: 97.7 F | DIASTOLIC BLOOD PRESSURE: 57 MMHG

## 2024-06-01 DIAGNOSIS — R42 LIGHTHEADEDNESS: ICD-10-CM

## 2024-06-01 LAB
ALBUMIN SERPL BCP-MCNC: 3.9 G/DL (ref 3.2–4.9)
ALBUMIN/GLOB SERPL: 1.4 G/DL
ALP SERPL-CCNC: 126 U/L (ref 30–99)
ALT SERPL-CCNC: 16 U/L (ref 2–50)
ANION GAP SERPL CALC-SCNC: 11 MMOL/L (ref 7–16)
AST SERPL-CCNC: 10 U/L (ref 12–45)
BASOPHILS # BLD AUTO: 0.1 % (ref 0–1.8)
BASOPHILS # BLD: 0.01 K/UL (ref 0–0.12)
BILIRUB SERPL-MCNC: 0.8 MG/DL (ref 0.1–1.5)
BUN SERPL-MCNC: 12 MG/DL (ref 8–22)
CALCIUM ALBUM COR SERPL-MCNC: 9.5 MG/DL (ref 8.5–10.5)
CALCIUM SERPL-MCNC: 9.4 MG/DL (ref 8.5–10.5)
CHLORIDE SERPL-SCNC: 106 MMOL/L (ref 96–112)
CO2 SERPL-SCNC: 26 MMOL/L (ref 20–33)
CREAT SERPL-MCNC: 0.86 MG/DL (ref 0.5–1.4)
EKG IMPRESSION: NORMAL
EOSINOPHIL # BLD AUTO: 0.1 K/UL (ref 0–0.51)
EOSINOPHIL NFR BLD: 0.8 % (ref 0–6.9)
ERYTHROCYTE [DISTWIDTH] IN BLOOD BY AUTOMATED COUNT: 55.3 FL (ref 35.9–50)
GFR SERPLBLD CREATININE-BSD FMLA CKD-EPI: 82 ML/MIN/1.73 M 2
GLOBULIN SER CALC-MCNC: 2.7 G/DL (ref 1.9–3.5)
GLUCOSE SERPL-MCNC: 119 MG/DL (ref 65–99)
HCT VFR BLD AUTO: 42.1 % (ref 37–47)
HGB BLD-MCNC: 12.8 G/DL (ref 12–16)
IMM GRANULOCYTES # BLD AUTO: 0.04 K/UL (ref 0–0.11)
IMM GRANULOCYTES NFR BLD AUTO: 0.3 % (ref 0–0.9)
LYMPHOCYTES # BLD AUTO: 2.15 K/UL (ref 1–4.8)
LYMPHOCYTES NFR BLD: 18.2 % (ref 22–41)
MAGNESIUM SERPL-MCNC: 2.2 MG/DL (ref 1.5–2.5)
MCH RBC QN AUTO: 27.4 PG (ref 27–33)
MCHC RBC AUTO-ENTMCNC: 30.4 G/DL (ref 32.2–35.5)
MCV RBC AUTO: 90 FL (ref 81.4–97.8)
MONOCYTES # BLD AUTO: 0.74 K/UL (ref 0–0.85)
MONOCYTES NFR BLD AUTO: 6.3 % (ref 0–13.4)
NEUTROPHILS # BLD AUTO: 8.77 K/UL (ref 1.82–7.42)
NEUTROPHILS NFR BLD: 74.3 % (ref 44–72)
NRBC # BLD AUTO: 0 K/UL
NRBC BLD-RTO: 0 /100 WBC (ref 0–0.2)
PHOSPHATE SERPL-MCNC: 2.8 MG/DL (ref 2.5–4.5)
PLATELET # BLD AUTO: 241 K/UL (ref 164–446)
PMV BLD AUTO: 11.2 FL (ref 9–12.9)
POTASSIUM SERPL-SCNC: 4.1 MMOL/L (ref 3.6–5.5)
PROT SERPL-MCNC: 6.6 G/DL (ref 6–8.2)
RBC # BLD AUTO: 4.68 M/UL (ref 4.2–5.4)
SODIUM SERPL-SCNC: 143 MMOL/L (ref 135–145)
TROPONIN T SERPL-MCNC: 25 NG/L (ref 6–19)
WBC # BLD AUTO: 11.8 K/UL (ref 4.8–10.8)

## 2024-06-01 PROCEDURE — 99284 EMERGENCY DEPT VISIT MOD MDM: CPT

## 2024-06-01 PROCEDURE — 84484 ASSAY OF TROPONIN QUANT: CPT

## 2024-06-01 PROCEDURE — 93005 ELECTROCARDIOGRAM TRACING: CPT | Performed by: STUDENT IN AN ORGANIZED HEALTH CARE EDUCATION/TRAINING PROGRAM

## 2024-06-01 PROCEDURE — 83735 ASSAY OF MAGNESIUM: CPT

## 2024-06-01 PROCEDURE — 93005 ELECTROCARDIOGRAM TRACING: CPT

## 2024-06-01 PROCEDURE — 85025 COMPLETE CBC W/AUTO DIFF WBC: CPT

## 2024-06-01 PROCEDURE — 36415 COLL VENOUS BLD VENIPUNCTURE: CPT

## 2024-06-01 PROCEDURE — 84100 ASSAY OF PHOSPHORUS: CPT

## 2024-06-01 PROCEDURE — 80053 COMPREHEN METABOLIC PANEL: CPT

## 2024-06-01 ASSESSMENT — FIBROSIS 4 INDEX: FIB4 SCORE: 0.58

## 2024-06-01 ASSESSMENT — PAIN DESCRIPTION - PAIN TYPE: TYPE: ACUTE PAIN

## 2024-06-02 ENCOUNTER — HOSPITAL ENCOUNTER (EMERGENCY)
Facility: MEDICAL CENTER | Age: 49
End: 2024-06-02
Attending: EMERGENCY MEDICINE
Payer: MEDICAID

## 2024-06-02 VITALS
RESPIRATION RATE: 18 BRPM | SYSTOLIC BLOOD PRESSURE: 131 MMHG | TEMPERATURE: 98.1 F | HEART RATE: 65 BPM | DIASTOLIC BLOOD PRESSURE: 72 MMHG | WEIGHT: 275 LBS | OXYGEN SATURATION: 92 % | BODY MASS INDEX: 51.92 KG/M2 | HEIGHT: 61 IN

## 2024-06-02 DIAGNOSIS — R53.1 WEAKNESS: ICD-10-CM

## 2024-06-02 DIAGNOSIS — E66.01 OBESITIES, MORBID (HCC): ICD-10-CM

## 2024-06-02 DIAGNOSIS — G47.30 SLEEP APNEA IN ADULT: ICD-10-CM

## 2024-06-02 LAB
ALBUMIN SERPL BCP-MCNC: 4 G/DL (ref 3.2–4.9)
ALBUMIN/GLOB SERPL: 1.4 G/DL
ALP SERPL-CCNC: 131 U/L (ref 30–99)
ALT SERPL-CCNC: 19 U/L (ref 2–50)
ANION GAP SERPL CALC-SCNC: 11 MMOL/L (ref 7–16)
APPEARANCE UR: CLEAR
AST SERPL-CCNC: 12 U/L (ref 12–45)
BASOPHILS # BLD AUTO: 0.2 % (ref 0–1.8)
BASOPHILS # BLD: 0.03 K/UL (ref 0–0.12)
BILIRUB SERPL-MCNC: 1 MG/DL (ref 0.1–1.5)
BILIRUB UR QL STRIP.AUTO: NEGATIVE
BUN SERPL-MCNC: 9 MG/DL (ref 8–22)
CALCIUM ALBUM COR SERPL-MCNC: 9.4 MG/DL (ref 8.5–10.5)
CALCIUM SERPL-MCNC: 9.4 MG/DL (ref 8.5–10.5)
CHLORIDE SERPL-SCNC: 106 MMOL/L (ref 96–112)
CO2 SERPL-SCNC: 25 MMOL/L (ref 20–33)
COLOR UR: YELLOW
CREAT SERPL-MCNC: 0.93 MG/DL (ref 0.5–1.4)
EKG IMPRESSION: NORMAL
EOSINOPHIL # BLD AUTO: 0.12 K/UL (ref 0–0.51)
EOSINOPHIL NFR BLD: 1 % (ref 0–6.9)
ERYTHROCYTE [DISTWIDTH] IN BLOOD BY AUTOMATED COUNT: 54.3 FL (ref 35.9–50)
GFR SERPLBLD CREATININE-BSD FMLA CKD-EPI: 75 ML/MIN/1.73 M 2
GLOBULIN SER CALC-MCNC: 2.8 G/DL (ref 1.9–3.5)
GLUCOSE SERPL-MCNC: 85 MG/DL (ref 65–99)
GLUCOSE UR STRIP.AUTO-MCNC: NEGATIVE MG/DL
HCT VFR BLD AUTO: 41 % (ref 37–47)
HGB BLD-MCNC: 13.2 G/DL (ref 12–16)
IMM GRANULOCYTES # BLD AUTO: 0.05 K/UL (ref 0–0.11)
IMM GRANULOCYTES NFR BLD AUTO: 0.4 % (ref 0–0.9)
KETONES UR STRIP.AUTO-MCNC: NEGATIVE MG/DL
LEUKOCYTE ESTERASE UR QL STRIP.AUTO: NEGATIVE
LYMPHOCYTES # BLD AUTO: 2.56 K/UL (ref 1–4.8)
LYMPHOCYTES NFR BLD: 20.5 % (ref 22–41)
MCH RBC QN AUTO: 28.3 PG (ref 27–33)
MCHC RBC AUTO-ENTMCNC: 32.2 G/DL (ref 32.2–35.5)
MCV RBC AUTO: 88 FL (ref 81.4–97.8)
MICRO URNS: NORMAL
MONOCYTES # BLD AUTO: 0.84 K/UL (ref 0–0.85)
MONOCYTES NFR BLD AUTO: 6.7 % (ref 0–13.4)
NEUTROPHILS # BLD AUTO: 8.87 K/UL (ref 1.82–7.42)
NEUTROPHILS NFR BLD: 71.2 % (ref 44–72)
NITRITE UR QL STRIP.AUTO: NEGATIVE
NRBC # BLD AUTO: 0 K/UL
NRBC BLD-RTO: 0 /100 WBC (ref 0–0.2)
PH UR STRIP.AUTO: 6.5 [PH] (ref 5–8)
PLATELET # BLD AUTO: 231 K/UL (ref 164–446)
PMV BLD AUTO: 11.2 FL (ref 9–12.9)
POTASSIUM SERPL-SCNC: 4 MMOL/L (ref 3.6–5.5)
PROT SERPL-MCNC: 6.8 G/DL (ref 6–8.2)
PROT UR QL STRIP: NEGATIVE MG/DL
RBC # BLD AUTO: 4.66 M/UL (ref 4.2–5.4)
RBC UR QL AUTO: NEGATIVE
SODIUM SERPL-SCNC: 142 MMOL/L (ref 135–145)
SP GR UR STRIP.AUTO: 1
TROPONIN T SERPL-MCNC: 25 NG/L (ref 6–19)
UROBILINOGEN UR STRIP.AUTO-MCNC: 0.2 MG/DL
WBC # BLD AUTO: 12.5 K/UL (ref 4.8–10.8)

## 2024-06-02 PROCEDURE — 85025 COMPLETE CBC W/AUTO DIFF WBC: CPT

## 2024-06-02 PROCEDURE — 81003 URINALYSIS AUTO W/O SCOPE: CPT

## 2024-06-02 PROCEDURE — 80053 COMPREHEN METABOLIC PANEL: CPT

## 2024-06-02 PROCEDURE — 36415 COLL VENOUS BLD VENIPUNCTURE: CPT

## 2024-06-02 PROCEDURE — 84484 ASSAY OF TROPONIN QUANT: CPT

## 2024-06-02 PROCEDURE — 93005 ELECTROCARDIOGRAM TRACING: CPT | Performed by: EMERGENCY MEDICINE

## 2024-06-02 PROCEDURE — 99285 EMERGENCY DEPT VISIT HI MDM: CPT

## 2024-06-02 PROCEDURE — 93005 ELECTROCARDIOGRAM TRACING: CPT

## 2024-06-02 ASSESSMENT — FIBROSIS 4 INDEX: FIB4 SCORE: 0.52

## 2024-06-02 NOTE — ED TRIAGE NOTES
"Chief Complaint   Patient presents with    Palpitations     Hx of a fib, seen here yesterday for same, denies CP, SR on monitor    Weakness     Patient states she feels \"wobbly\" while walking x 3 weeks     /72   Pulse 71   Temp 36.7 °C (98.1 °F) (Temporal)   Resp 16   Ht 1.549 m (5' 1\")   Wt 125 kg (275 lb)   LMP  (LMP Unknown)   SpO2 94%   BMI 51.96 kg/m²     BIBA for above, EKG in process.    "

## 2024-06-02 NOTE — ED NOTES
Pt WC from the lobby to T-4. Pt changed into a gown and placed on the monitor. Agree with triage note. Chart up for ERP.

## 2024-06-02 NOTE — ED TRIAGE NOTES
"Chief Complaint   Patient presents with    Dizziness     [facility]. Patient states that she was at [employer] drinking a soda and talking to the  when she suddenly felt dizzy and she is concerned that it is her AFIB. Patient has a history of AFIB and COPD and states that she recently quit smoking.    Patient also complaining of a cough and a headache.    Pt is alert and oriented, speaking in full sentences, follows commands and responds appropriately to questions. Resp are even and unlabored.      Pt placed in lobby. Pt educated on triage process. Pt encouraged to alert staff for any changes.     Patient and staff wearing appropriate PPE.    /75   Pulse 79   Temp 36.5 °C (97.7 °F) (Temporal)   Resp 14   Ht 1.549 m (5' 1\")   Wt 125 kg (275 lb)   SpO2 92%   - RA  "

## 2024-06-02 NOTE — ED PROVIDER NOTES
"ER Provider Note    Scribed for Shay Nicole M.d. by Heather Thorne. 6/2/2024  4:40 PM    Primary Care Provider: Pcp Unknown    CHIEF COMPLAINT   Chief Complaint   Patient presents with    Palpitations     Hx of a fib, seen here yesterday for same, denies CP, SR on monitor    Weakness     Patient states she feels \"wobbly\" while walking x 3 weeks     EXTERNAL RECORDS REVIEWED  Other The patient was seen yesterday at the ER for lightheadedness.    HPI/ROS  LIMITATION TO HISTORY   Select: : None  OUTSIDE HISTORIAN(S):  None    Lorene Haro is a 48 y.o. female who presents to the ED with a history of atrial fibrillation complaining of palpitations and other symptoms. She states her symptoms of palpitations are not new since being seen yesterday, however reports also feeling symptoms of general pain across the body. She states she also has been experiencing left sided leg pain around the ankle and knee. She reports being unable to get comfortable. She states she feels \"wobbly\" when she is ambulating.       PAST MEDICAL HISTORY  Past Medical History:   Diagnosis Date    A-fib (HCC)     MONI (acute kidney injury) (HCC) 8/9/2023    Asthma     Bipolar 2 disorder (HCC)     Chickenpox     Chronic obstructive pulmonary disease (HCC)     Hypertension     On home oxygen therapy     Other psychological stress     Schizophrenic disorder (HCC)     Yeast infection of the skin 04/01/2021       SURGICAL HISTORY  Past Surgical History:   Procedure Laterality Date    CHOLECYSTECTOMY      COLECTOMY      HYSTERECTOMY LAPAROSCOPY      KNEE ARTHROSCOPY Left        FAMILY HISTORY  Family History   Problem Relation Age of Onset    No Known Problems Mother     Cancer Sister        SOCIAL HISTORY   reports that she has been smoking cigarettes. She has never used smokeless tobacco. She reports that she does not currently use alcohol. She reports that she does not use drugs.    CURRENT MEDICATIONS  Previous Medications    ADVAIR DISKUS " "100-50 MCG/ACT AEROSOL POWDER, BREATH ACTIVATED    Inhale 1 Puff 2 times a day.    ALBUTEROL 108 (90 BASE) MCG/ACT AERO SOLN INHALATION AEROSOL    Inhale 2 Puffs every 6 hours as needed for Shortness of Breath.    APIXABAN (ELIQUIS) 5MG TAB    Take 1 Tablet by mouth 2 times a day.    ARIPIPRAZOLE (ABILIFY) 30 MG TABLET    Take 1 Tablet by mouth every morning. Indications: Major Depressive Disorder    ATORVASTATIN (LIPITOR) 20 MG TAB    Take 1 Tablet by mouth at bedtime.    BENZONATATE (TESSALON) 100 MG CAP    Take 1 Capsule by mouth 3 times a day as needed for Cough.    FLUTICASONE (FLONASE) 50 MCG/ACT NASAL SPRAY    Administer 2 Sprays into each nostril every morning.    FUROSEMIDE (LASIX) 20 MG TAB    Take 1 Tablet by mouth as needed (Take 1 tablet daily as needed for swelling).    GUAIFENESIN (ROBITUSSIN) 100 MG/5ML LIQUID    Take 10 mL by mouth every four hours as needed for Cough.    HOME CARE OXYGEN    Inhale 2 L/min at bedtime. DME [facility] (now [facility])    METOPROLOL SR (TOPROL XL) 25 MG TABLET SR 24 HR    Take 25 mg by mouth every day.    MONTELUKAST (SINGULAIR) 10 MG TAB    Take 1 Tablet by mouth every evening.       ALLERGIES  Penicillin g and Amoxicillin    PHYSICAL EXAM  /72   Pulse 71   Temp 36.7 °C (98.1 °F) (Temporal)   Resp 16   Ht 1.549 m (5' 1\")   Wt 125 kg (275 lb)   LMP  (LMP Unknown)   SpO2 94%   BMI 51.96 kg/m²   Constitutional: Morbidly obese with a very short neck, No acute distress, Non-toxic appearance.  Falls asleep easily  HENT: Normocephalic, Atraumatic, Bilateral external ears normal, Oropharynx is clear mucous membranes are moist. No oral exudates or nasal discharge.   Eyes: Pupils are equal round and reactive, EOMI, Conjunctiva normal, No discharge.   Neck: Normal range of motion, No tenderness, Supple, No stridor. No meningismus.  Lymphatic: No lymphadenopathy noted.   Cardiovascular: Regular rate and rhythm without murmur rub or gallop.  Thorax & Lungs: Clear breath " sounds bilaterally without wheezes, rhonchi or rales. There is no chest wall tenderness.   Abdomen: Soft, obese, non-tender non-distended. There is no rebound or guarding. No organomegaly is appreciated. Bowel sounds are normal.  Skin: Normal without rash.   Back: No CVA or spinal tenderness.   Extremities: Intact distal pulses, No edema, No tenderness, No cyanosis, No clubbing. Capillary refill is less than 2 seconds.  Musculoskeletal: Good range of motion in all major joints. No tenderness to palpation or major deformities noted.   Neurologic: Alert & oriented x 3, Normal motor function, Normal sensory function, No focal deficits noted. Reflexes are normal.  Psychiatric: Affect normal, Judgment normal, Mood normal. There is no suicidal ideation or patient reported hallucinations.       DIAGNOSTIC STUDIES    EKG/LABS      Labs Reviewed   URINALYSIS   CBC WITH DIFFERENTIAL   COMP METABOLIC PANEL   TROPONIN       Results for orders placed or performed during the hospital encounter of 24   EKG   Result Value Ref Range    Report       [facility] Emergency Dept.    Test Date:  2024  Pt Name:    ADELINA MILLAN                Department: ER  MRN:        [id number]                      Room:       RD 11  Gender:     Female                       Technician:  :        [id number]                   Requested By:ER TRIAGE PROTOCOL  Order #:    [id number]                    Reading MD:    Measurements  Intervals                                Axis  Rate:       69                           P:          44  GA:         136                          QRS:        41  QRSD:       107                          T:          -4  QT:         376  QTc:        403    Interpretive Statements  Sinus rhythm  Atrial premature complex  Low voltage, precordial leads  RSR' in V1 or V2, right VCD or RVH  Compared to ECG 2024 20:18:44  Atrial premature complex(es) now present  Right ventricular hypertrophy now  present  RSR' in V1 or V2 now present       *Note: Due to a large number of results and/or encounters for the requested time period, some results have not been displayed. A complete set of results can be found in Results Review.       I have independently interpreted this EKG        COURSE & MEDICAL DECISION MAKING     ASSESSMENT, COURSE AND PLAN  Care Narrative:     4: 40 PM- Patient seen and examined at bedside.  We will check a urine to make sure she does not have a urinalysis this given her weakness and some wobbly feeling but she was just worked up yesterday and I have my doubts we will find anything significant.      Discussed plan of care, including workup. Patient agrees to the plan of care. Ordered for EKG to evaluate her symptoms.     EKG shows no evidence of acute ischemic changes or dysrhythmia.  She has sinus rhythm not A-fib     Laboratory evaluation reveals leukocytosis 12,500 with no significant shift.  No evidence of anemia.  No electrolyte derangements or acidosis.  Urinalysis is negative for infection.  Troponins unremarkable at 25 and this is same value that we had yesterday at 25.    I watched the patient quite a bit during her emergency department course and she seems to have obstructive sleep apnea and I think she needs a sleep study.- Patient was reevaluated at bedside. Discussed lab results with the patient and informed them she does not have anything that is serious in need of hospitalization and I think she is an outpatient candidate for further workup. Patient had the opportunity to ask any questions. The plan for discharge was discussed with them and they were told to return for any new or worsening symptoms. She was also informed of the plans for follow up.  She actually tells me that she is scheduled for sleep study through her pulmonary doctor later this month.  Patient is understanding and agreeable to the plan for discharge.      DISPOSITION:  Patient will be discharged home in  stable condition.    FOLLOW UP:  Strongly suggesting follow-up for sleep study as an outpatient        DISPOSITION AND DISCUSSIONS  Escalation of care considered, and ultimately not performed: acute inpatient care management, however at this time, the patient is most appropriate for outpatient management.  I think she can get a sleep study as an outpatient and inpatient management would be unwarranted at this time    Patient lives in a group home and I have given instructions for the group home to arrange follow-up for sleep study as she likely needs CPAP at night to feel better    FINAL DIANGOSIS  1. Weakness    2. Sleep apnea in adult    3. Obesities, morbid (HCC)           The note accurately reflects work and decisions made by me.  Shay Nicole M.D.  6/2/2024  5:33 PM

## 2024-06-02 NOTE — ED PROVIDER NOTES
ED Provider Note    CHIEF COMPLAINT  Chief Complaint   Patient presents with    Dizziness       EXTERNAL RECORDS REVIEWED  Inpatient Notes the patient admission discharge summary from admission for COPD exacerbation hypoxic respiratory failure.  Reviewed for medical history    HPI/ROS  LIMITATION TO HISTORY   Select: : None  OUTSIDE HISTORIAN(S):  none    Lorene Haro is a 48 y.o. female with past medical history of atrial fibrillation, bipolar 2 disorder, COPD, hypertension presenting to the emergency department for lightheadedness and palpitations.  Patient says that she thinks her atrial fibrillation is acting up.  In the emergency department now her palpitations are resolved, she continues to feel slightly lightheaded.  She has no chest pain.  No shortness of breath.    PAST MEDICAL HISTORY   has a past medical history of A-fib ([facility]), MONI (acute kidney injury) ([facility]) (2023), Asthma, Bipolar 2 disorder ([facility]), Chickenpox, Chronic obstructive pulmonary disease (HCC), Hypertension, On home oxygen therapy, Other psychological stress, Schizophrenic disorder ([facility]), and Yeast infection of the skin (2021).    SURGICAL HISTORY   has a past surgical history that includes hysterectomy laparoscopy; colectomy; cholecystectomy; and knee arthroscopy (Left).    FAMILY HISTORY  Family History   Problem Relation Age of Onset    No Known Problems Mother     Cancer Sister        SOCIAL HISTORY  Social History     Tobacco Use    Smoking status: Some Days     Current packs/day: 0.00     Types: Cigarettes     Last attempt to quit: 10/12/2022     Years since quittin.6    Smokeless tobacco: Never   Vaping Use    Vaping status: Never Used   Substance and Sexual Activity    Alcohol use: Not Currently    Drug use: Never    Sexual activity: Not Currently       CURRENT MEDICATIONS  Home Medications       Reviewed by Kranthi Mayfield R.N. (Registered Nurse) on 24 at 1953  Med List Status: Not Addressed  "    Medication Last Dose Status   ADVAIR DISKUS 100-50 MCG/ACT AEROSOL POWDER, BREATH ACTIVATED  Active   albuterol 108 (90 Base) MCG/ACT Aero Soln inhalation aerosol  Active   apixaban (ELIQUIS) 5mg Tab  Active   aripiprazole (ABILIFY) 30 MG tablet  Active   atorvastatin (LIPITOR) 20 MG Tab  Active   benzonatate (TESSALON) 100 MG Cap  Active   fluticasone (FLONASE) 50 MCG/ACT nasal spray  Active   furosemide (LASIX) 20 MG Tab  Active   guaiFENesin (ROBITUSSIN) 100 MG/5ML liquid  Active   Home Care Oxygen  Active   metoprolol SR (TOPROL XL) 25 MG TABLET SR 24 HR  Active   montelukast (SINGULAIR) 10 MG Tab  Active                    ALLERGIES  Allergies   Allergen Reactions    Penicillin G Rash and Itching     pt reports that she gets a rash all over her body and gets itchy    Amoxicillin Rash and Itching     Tolerates cephalosporins, pt reports that she gets a rash all over her body and gets itchy       PHYSICAL EXAM  VITAL SIGNS: /57   Pulse 80   Temp 36.5 °C (97.7 °F) (Temporal)   Resp 16   Ht 1.549 m (5' 1\")   Wt 125 kg (275 lb)   LMP  (LMP Unknown)   SpO2 94%   BMI 51.96 kg/m²    General: Well- appearing , non-toxic, no acute distress  Neuro: oriented x 3, moving all extremities.   HEENT:   - Head: Normocephalic, atraumatic  - Eyes: PERRL  - Ears/Nose: normal external nose and ears  - Mouth: moist mucosal membranes  Resp: clear to auscultation, no increased work of breathing  CV: Regular rate and rhythm  Abd: Soft, non-tender, non-distended  Extremities: No peripheral edema  Psych: lucid and conversational         DIAGNOSTIC STUDIES / PROCEDURES    EKG  My independent EKG interpretation:  Results for orders placed or performed during the hospital encounter of 06/01/24   EKG   Result Value Ref Range    Report       [facility] Emergency Dept.    Test Date:  2024-06-01  Pt Name:    ADELINA MILLAN                Department: ER  MRN:        [id number]                      Room:  Gender:  "    Female                       Technician: [id number]  :        1975                   Requested By:ER TRIAGE PROTOCOL  Order #:    [id number]                    Reading MD: Aron Rodriguez    Measurements  Intervals                                Axis  Rate:       81                           P:          0  WI:         131                          QRS:        55  QRSD:       88                           T:          4  QT:         364  QTc:        423    Interpretive Statements  Sinus rhythm  Probable left atrial enlargement  Low voltage, precordial leads  Baseline wander in lead(s) II,III,aVF,V5  Compared to ECG 2024 21:51:48  No significant changes  Electronically Signed On 2024 20:51:17 PDT by Aron Rodriguez       *Note: Due to a large number of results and/or encounters for the requested time period, some results have not been displayed. A complete set of results can be found in Results Review.       LABS  Results for orders placed or performed during the hospital encounter of 24   CBC WITH DIFFERENTIAL   Result Value Ref Range    WBC 11.8 (H) 4.8 - 10.8 K/uL    RBC 4.68 4.20 - 5.40 M/uL    Hemoglobin 12.8 12.0 - 16.0 g/dL    Hematocrit 42.1 37.0 - 47.0 %    MCV 90.0 81.4 - 97.8 fL    MCH 27.4 27.0 - 33.0 pg    MCHC 30.4 (L) 32.2 - 35.5 g/dL    RDW 55.3 (H) 35.9 - 50.0 fL    Platelet Count 241 164 - 446 K/uL    MPV 11.2 9.0 - 12.9 fL    Neutrophils-Polys 74.30 (H) 44.00 - 72.00 %    Lymphocytes 18.20 (L) 22.00 - 41.00 %    Monocytes 6.30 0.00 - 13.40 %    Eosinophils 0.80 0.00 - 6.90 %    Basophils 0.10 0.00 - 1.80 %    Immature Granulocytes 0.30 0.00 - 0.90 %    Nucleated RBC 0.00 0.00 - 0.20 /100 WBC    Neutrophils (Absolute) 8.77 (H) 1.82 - 7.42 K/uL    Lymphs (Absolute) 2.15 1.00 - 4.80 K/uL    Monos (Absolute) 0.74 0.00 - 0.85 K/uL    Eos (Absolute) 0.10 0.00 - 0.51 K/uL    Baso (Absolute) 0.01 0.00 - 0.12 K/uL    Immature Granulocytes (abs) 0.04 0.00 - 0.11 K/uL    NRBC (Absolute) 0.00  K/uL   COMP METABOLIC PANEL   Result Value Ref Range    Sodium 143 135 - 145 mmol/L    Potassium 4.1 3.6 - 5.5 mmol/L    Chloride 106 96 - 112 mmol/L    Co2 26 20 - 33 mmol/L    Anion Gap 11.0 7.0 - 16.0    Glucose 119 (H) 65 - 99 mg/dL    Bun 12 8 - 22 mg/dL    Creatinine 0.86 0.50 - 1.40 mg/dL    Calcium 9.4 8.5 - 10.5 mg/dL    Correct Calcium 9.5 8.5 - 10.5 mg/dL    AST(SGOT) 10 (L) 12 - 45 U/L    ALT(SGPT) 16 2 - 50 U/L    Alkaline Phosphatase 126 (H) 30 - 99 U/L    Total Bilirubin 0.8 0.1 - 1.5 mg/dL    Albumin 3.9 3.2 - 4.9 g/dL    Total Protein 6.6 6.0 - 8.2 g/dL    Globulin 2.7 1.9 - 3.5 g/dL    A-G Ratio 1.4 g/dL   PHOSPHORUS   Result Value Ref Range    Phosphorus 2.8 2.5 - 4.5 mg/dL   MAGNESIUM   Result Value Ref Range    Magnesium 2.2 1.5 - 2.5 mg/dL   TROPONIN   Result Value Ref Range    Troponin T 25 (H) 6 - 19 ng/L   ESTIMATED GFR   Result Value Ref Range    GFR (CKD-EPI) 82 >60 mL/min/1.73 m 2   EKG   Result Value Ref Range    Report       [facility] Emergency Dept.    Test Date:  2024  Pt Name:    ADELINA MILLAN                Department: ER  MRN:        [id number]                      Room:  Gender:     Female                       Technician: [id number]  :        1975                   Requested By:ER TRIAGE PROTOCOL  Order #:    [id number]                    Reading MD: Aron Rodriguez    Measurements  Intervals                                Axis  Rate:       81                           P:          0  ME:         131                          QRS:        55  QRSD:       88                           T:          4  QT:         364  QTc:        423    Interpretive Statements  Sinus rhythm  Probable left atrial enlargement  Low voltage, precordial leads  Baseline wander in lead(s) II,III,aVF,V5  Compared to ECG 2024 21:51:48  No significant changes  Electronically Signed On 2024 20:51:17 PDT by Aron Rodriguez       *Note: Due to a large number of results and/or  encounters for the requested time period, some results have not been displayed. A complete set of results can be found in Results Review.       RADIOLOGY  I have independently interpreted the diagnostic imaging associated with this visit and am waiting the final reading from the radiologist.   My preliminary interpretation is as follows:   -   Radiologist interpretation:   No orders to display           MEDICAL DECISION MAKING      ED COURSE AND PLAN    Lorene Haro is a 48 y.o. female with past medical history of paroxysmal atrial fibrillation presenting to the emergency department for palpitations and lightheadedness earlier today.  On arrival to the emergency department Patient is overall well-appearing with stable vital signs.  EKG shows sinus rhythm, no evidence of dysrhythmia or ischemia to explain her symptoms.  She is no longer exhibiting palpitations.  Vital signs are stable.  I obtain baseline labs to check her electrolytes and troponin.  Troponin is at her baseline.  She has no active chest pain, no indication to repeat troponin.  Her chemistry panel is unremarkable.     Patient is appropriately anticoagulated for atrial fibrillation with Eliquis.    On reevaluation patient is stable, feeling better appropriate for discharge, strict return precautions discussed.  From review of her medications does not look like she is currently on any rate controlling medication.      ---Pertinent ED Course---:    11:56 PM I reviewed the patient's old records in Epic, medication list, allergies, past medical history and performed a physical examination.         Procedures:      ----------------------------------------------------------------------------------  DISCUSSIONS    I have discussed management of the patient with the following physicians and JAY's:      Discussion of management with other [facility] or appropriate source(s):     Escalation of care considered, and ultimately not performed: Considered serial  troponins    Barriers to care at this time, including but not limited to:     Decision tools and prescription drugs considered including, but not limited to:     FINAL IMPRESSION    1. Lightheadedness        Discharge Medication List as of 6/1/2024 10:29 PM            DISPOSITION    Discharge home, Stable        This chart was dictated using an electronic voice recognition software. The chart has been reviewed and edited but there is still possibility for dictation errors due to limitation of software.    Aron Rodriguez, DO 6/1/2024

## 2024-06-02 NOTE — ED NOTES
EKG done, patient seen by ERP, voiding w/out difficulty via bedpan, PIV established, blood drawn and sent to lab.

## 2024-06-02 NOTE — DISCHARGE INSTRUCTIONS
You are seen in the emergency department for palpitations.  Your labs and EKG were reassuring in the emergency department.  Keep yourself well-hydrated come back for worsening symptoms..

## 2024-06-03 ENCOUNTER — NON-PROVIDER VISIT (OUTPATIENT)
Dept: CARDIOLOGY | Facility: MEDICAL CENTER | Age: 49
End: 2024-06-03
Attending: NURSE PRACTITIONER
Payer: MEDICAID

## 2024-06-03 ENCOUNTER — OFFICE VISIT (OUTPATIENT)
Dept: CARDIOLOGY | Facility: MEDICAL CENTER | Age: 49
End: 2024-06-03
Attending: INTERNAL MEDICINE
Payer: MEDICAID

## 2024-06-03 ENCOUNTER — NON-PROVIDER VISIT (OUTPATIENT)
Dept: CARDIOLOGY | Facility: MEDICAL CENTER | Age: 49
End: 2024-06-03

## 2024-06-03 VITALS
DIASTOLIC BLOOD PRESSURE: 50 MMHG | WEIGHT: 279 LBS | OXYGEN SATURATION: 93 % | HEART RATE: 73 BPM | HEIGHT: 61 IN | BODY MASS INDEX: 52.67 KG/M2 | RESPIRATION RATE: 18 BRPM | SYSTOLIC BLOOD PRESSURE: 106 MMHG

## 2024-06-03 DIAGNOSIS — I48.0 PAROXYSMAL ATRIAL FIBRILLATION (HCC): ICD-10-CM

## 2024-06-03 DIAGNOSIS — Z95.818 STATUS POST PLACEMENT OF IMPLANTABLE LOOP RECORDER: ICD-10-CM

## 2024-06-03 DIAGNOSIS — E78.2 MIXED HYPERLIPIDEMIA: ICD-10-CM

## 2024-06-03 PROCEDURE — 99214 OFFICE O/P EST MOD 30 MIN: CPT | Performed by: INTERNAL MEDICINE

## 2024-06-03 PROCEDURE — 93298 REM INTERROG DEV EVAL SCRMS: CPT | Mod: 26 | Performed by: INTERNAL MEDICINE

## 2024-06-03 PROCEDURE — 3078F DIAST BP <80 MM HG: CPT | Performed by: INTERNAL MEDICINE

## 2024-06-03 PROCEDURE — 93291 INTERROG DEV EVAL SCRMS IP: CPT | Mod: 26 | Performed by: INTERNAL MEDICINE

## 2024-06-03 PROCEDURE — 99213 OFFICE O/P EST LOW 20 MIN: CPT | Performed by: INTERNAL MEDICINE

## 2024-06-03 PROCEDURE — 93290 INTERROG DEV EVAL ICPMS IP: CPT | Performed by: INTERNAL MEDICINE

## 2024-06-03 PROCEDURE — 3074F SYST BP LT 130 MM HG: CPT | Performed by: INTERNAL MEDICINE

## 2024-06-03 ASSESSMENT — ENCOUNTER SYMPTOMS
DYSPNEA ON EXERTION: 0
BACK PAIN: 0
SYNCOPE: 0
CLAUDICATION: 0
DECREASED APPETITE: 0
WEIGHT LOSS: 0
CONSTIPATION: 0
PND: 0
NAUSEA: 0
DIZZINESS: 0
HEARTBURN: 0
SHORTNESS OF BREATH: 0
NEAR-SYNCOPE: 0
FEVER: 0
PALPITATIONS: 0
WEIGHT GAIN: 0
COUGH: 0
DEPRESSION: 0
ABDOMINAL PAIN: 0
FLANK PAIN: 0
IRREGULAR HEARTBEAT: 0
ALTERED MENTAL STATUS: 0
DIARRHEA: 0
BLURRED VISION: 0
ORTHOPNEA: 0
VOMITING: 0

## 2024-06-03 ASSESSMENT — FIBROSIS 4 INDEX: FIB4 SCORE: 0.6

## 2024-06-03 NOTE — DISCHARGE INSTRUCTIONS
Please obtain a sleep study through your primary care provider as I think your weakness overall in the past few weeks to even months is due to sleep apnea that is untreated    Your EKG and lab work looks very good today and there is no indication you need to be in the hospital

## 2024-06-03 NOTE — CARDIAC REMOTE MONITOR - SCAN
Device transmission reviewed. Device demonstrated appropriate function.       Electronically Signed by: Rodney Alberto M.D.    6/3/2024  11:31 AM

## 2024-06-03 NOTE — CARDIAC REMOTE MONITOR - SCAN
Device transmission reviewed. Device demonstrated appropriate function.       Electronically Signed by: Rodney Alberto M.D.    6/3/2024  3:43 PM\

## 2024-06-03 NOTE — ED NOTES
PIV removed, AVS reviewed with patient, patient verbalized understanding, patient able to dress self, transfer to wheelchair and ambulate independently, given [place] transportation phone number, discharged to [facility] via wheelchair.

## 2024-06-03 NOTE — PROGRESS NOTES
Cardiology Note    Chief Complaint   Patient presents with    Atrial Fibrillation     F/v Dx: Paroxysmal atrial fibrillation (HCC    Hyperlipidemia    Cardiomegaly       History of Present Illness: Lorene Haro is a 48 y.o. female PMH KATHIA on cpap, restrictive/obstructive lung disease, morbid obesity, former smoker, paroxysmal AF, diastolic heart failure who presents for follow up visit.    Breathing stable she says. No cardiac complaints. Suspected had recurrent atrial flutter so presented to ED but turned out not to be. Compliant with medications and denies adverse effects. Pending cpap initiation.     Review of Systems   Constitutional: Negative for decreased appetite, fever, malaise/fatigue, weight gain and weight loss.   HENT:  Negative for congestion and nosebleeds.    Eyes:  Negative for blurred vision.   Cardiovascular:  Negative for chest pain, claudication, dyspnea on exertion, irregular heartbeat, leg swelling, near-syncope, orthopnea, palpitations, paroxysmal nocturnal dyspnea and syncope.   Respiratory:  Negative for cough and shortness of breath.    Endocrine: Negative for cold intolerance and heat intolerance.   Skin:  Negative for rash.   Musculoskeletal:  Negative for back pain.   Gastrointestinal:  Negative for abdominal pain, constipation, diarrhea, heartburn, melena, nausea and vomiting.   Genitourinary:  Negative for dysuria, flank pain and hematuria.   Neurological:  Negative for dizziness.   Psychiatric/Behavioral:  Negative for altered mental status and depression.          Past Medical History:   Diagnosis Date    A-fib (HCC)     MONI (acute kidney injury) (HCC) 8/9/2023    Asthma     Bipolar 2 disorder (HCC)     Chickenpox     Chronic obstructive pulmonary disease (HCC)     Hypertension     On home oxygen therapy     Other psychological stress     Schizophrenic disorder (HCC)     Yeast infection of the skin 04/01/2021         Past Surgical History:   Procedure Laterality Date     CHOLECYSTECTOMY      COLECTOMY      HYSTERECTOMY LAPAROSCOPY      KNEE ARTHROSCOPY Left          Current Outpatient Medications   Medication Sig Dispense Refill    ADVAIR DISKUS 100-50 MCG/ACT AEROSOL POWDER, BREATH ACTIVATED Inhale 1 Puff 2 times a day. 180 Each 3    albuterol 108 (90 Base) MCG/ACT Aero Soln inhalation aerosol Inhale 2 Puffs every 6 hours as needed for Shortness of Breath. 18 g 11    benzonatate (TESSALON) 100 MG Cap Take 1 Capsule by mouth 3 times a day as needed for Cough. 60 Capsule 0    guaiFENesin (ROBITUSSIN) 100 MG/5ML liquid Take 10 mL by mouth every four hours as needed for Cough. 840 mL 0    Home Care Oxygen Inhale 2 L/min at bedtime. DME [facility] (now [facility])      metoprolol SR (TOPROL XL) 25 MG TABLET SR 24 HR Take 25 mg by mouth every day.      apixaban (ELIQUIS) 5mg Tab Take 1 Tablet by mouth 2 times a day. 200 Tablet 3    fluticasone (FLONASE) 50 MCG/ACT nasal spray Administer 2 Sprays into each nostril every morning.      aripiprazole (ABILIFY) 30 MG tablet Take 1 Tablet by mouth every morning. Indications: Major Depressive Disorder 30 Tablet 0    atorvastatin (LIPITOR) 20 MG Tab Take 1 Tablet by mouth at bedtime. 30 Tablet 0    montelukast (SINGULAIR) 10 MG Tab Take 1 Tablet by mouth every evening. 90 Tablet 3     No current facility-administered medications for this visit.         Allergies   Allergen Reactions    Penicillin G Rash and Itching     pt reports that she gets a rash all over her body and gets itchy    Amoxicillin Rash and Itching     Tolerates cephalosporins, pt reports that she gets a rash all over her body and gets itchy         Family History   Problem Relation Age of Onset    No Known Problems Mother     Cancer Sister          Social History     Socioeconomic History    Marital status: Single     Spouse name: Not on file    Number of children: Not on file    Years of education: Not on file    Highest education level: Not on file   Occupational History    Not on  "file   Tobacco Use    Smoking status: Some Days     Current packs/day: 0.00     Types: Cigarettes     Last attempt to quit: 10/12/2022     Years since quittin.6    Smokeless tobacco: Never   Vaping Use    Vaping status: Never Used   Substance and Sexual Activity    Alcohol use: Not Currently    Drug use: Never    Sexual activity: Not Currently   Other Topics Concern    Not on file   Social History Narrative    ** Merged History Encounter **         From [place]     Social [facility]     Financial Resource Strain: Medium Risk (2022)    Overall Financial Resource Strain (CARDIA)     Difficulty of Paying Living Expenses: Somewhat hard   Food Insecurity: No Food Insecurity (2024)    Hunger Vital Sign     Worried About Running Out of Food in the Last Year: Never true     Ran Out of Food in the Last Year: Never true   Transportation Needs: No Transportation Needs (2024)    PRAPARE - Transportation     Lack of Transportation (Medical): No     Lack of Transportation (Non-Medical): No   Physical Activity: Not on file   Stress: Not on file   Social Connections: Not on file   Intimate Partner Violence: Not At Risk (2024)    Humiliation, Afraid, Rape, and Kick questionnaire     Fear of Current or Ex-Partner: No     Emotionally Abused: No     Physically Abused: No     Sexually Abused: No   Housing Stability: High Risk (2024)    Housing Stability Vital Sign     Unable to Pay for Housing in the Last Year: No     Number of Places Lived in the Last Year: 4     Unstable Housing in the Last Year: No         Physical Exam:  Ambulatory Vitals  /50 (BP Location: Left arm, Patient Position: Sitting, BP Cuff Size: Adult)   Pulse 73   Resp 18   Ht 1.549 m (5' 1\")   Wt (!) 127 kg (279 lb)   SpO2 93%    BP Readings from Last 4 Encounters:   24 106/50   24 131/72   24 120/57   24 124/76     Weight/BMI:   Vitals:    24 1458   BP: 106/50   Weight: (!) 127 kg (279 lb) " "  Height: 1.549 m (5' 1\")    Body mass index is 52.72 kg/m².  Wt Readings from Last 4 Encounters:   06/03/24 (!) 127 kg (279 lb)   06/02/24 125 kg (275 lb)   06/01/24 125 kg (275 lb)   05/30/24 125 kg (275 lb)       Physical Exam  Constitutional:       General: She is not in acute distress.  HENT:      Head: Normocephalic and atraumatic.   Eyes:      Conjunctiva/sclera: Conjunctivae normal.      Pupils: Pupils are equal, round, and reactive to light.   Neck:      Vascular: No JVD.   Cardiovascular:      Rate and Rhythm: Normal rate and regular rhythm.      Heart sounds: Normal heart sounds. No murmur heard.     No friction rub. No gallop.   Pulmonary:      Effort: Pulmonary effort is normal. No respiratory distress.      Breath sounds: Normal breath sounds. No wheezing or rales.   Chest:      Chest wall: No tenderness.   Abdominal:      General: Bowel sounds are normal. There is no distension.      Palpations: Abdomen is soft.   Musculoskeletal:      Cervical back: Normal range of motion and neck supple.   Skin:     General: Skin is warm and dry.   Neurological:      Mental Status: She is alert and oriented to person, place, and time.   Psychiatric:         Mood and Affect: Affect normal.         Judgment: Judgment normal.         Lab Data Review:  Lab Results   Component Value Date/Time    CHOLSTRLTOT 130 11/01/2023 01:26 PM    LDL 57 11/01/2023 01:26 PM    HDL 44 11/01/2023 01:26 PM    TRIGLYCERIDE 144 11/01/2023 01:26 PM       Lab Results   Component Value Date/Time    SODIUM 142 06/02/2024 04:45 PM    POTASSIUM 4.0 06/02/2024 04:45 PM    CHLORIDE 106 06/02/2024 04:45 PM    CO2 25 06/02/2024 04:45 PM    GLUCOSE 85 06/02/2024 04:45 PM    BUN 9 06/02/2024 04:45 PM    CREATININE 0.93 06/02/2024 04:45 PM    CREATININE 0.9 09/11/2008 01:45 PM    BUNCREATRAT 7.5 05/19/2022 02:18 AM     Estimated Creatinine Clearance: 90.8 mL/min (by C-G formula based on SCr of 0.93 mg/dL).  Lab Results   Component Value Date/Time    " "ALKPHOSPHAT 131 (H) 06/02/2024 04:45 PM    ASTSGOT 12 06/02/2024 04:45 PM    ALTSGPT 19 06/02/2024 04:45 PM    TBILIRUBIN 1.0 06/02/2024 04:45 PM      Lab Results   Component Value Date/Time    WBC 12.5 (H) 06/02/2024 04:45 PM     Lab Results   Component Value Date/Time    HBA1C 5.8 (H) 11/01/2023 01:26 PM     No components found for: \"TROP\"      Cardiac Imaging and Procedures Review:      TTE 11/18/2019  CONCLUSIONS  Compared to the images of the study done on 11/14/2018 - there has been   no significant change.   Normal left ventricular systolic function.  Left ventricular ejection fraction is visually estimated to be 60%.  Normal diastolic function.  Normal right ventricular systolic function.  Mild mitral regurgitation.  Moderate tricuspid regurgitation.  Right ventricular systolic pressure is estimated to be 65 mmHg.    TTE 11/14/2018  CONCLUSIONS  No prior study is available for comparison.   Technically difficult study - adequate information is obtained.   Grossly normal left ventricular systolic function.  Left ventricular ejection fraction is visually estimated to be 65%.  Probable mild aortic stenosis.  Dilated inferior vena cava without   inspiratory collapse.  Estimated right ventricular systolic pressure is   60 mmHg.    PFTs 9/2021  Interpretation;   Baseline spirometry shows significant reduction in FVC at 1.45 L or 46% predicted and FEV1 at 1.24 L or 48% predicted with FEV1/FVC ratio of 86 suggesting restriction.  There is significant bronchodilator response with postbronchodilator FEV1 of 1.48 L or 58% predicted.  Total lung capacity is just below lower limits of normal at 3.61 L or 78% predicted.  Diffusion capacity is within normal limits at 85% predicted.  Pulmonary function testing shows mixed restrictive obstructive defect with significant bronchodilator response.  This could be consistent with COPD in a patient with morbid obesity.  Correlate clinically and with imaging    MPI spect stress " 2/2022  NUCLEAR IMAGING INTERPRETATION   No evidence of significant jeopardized viable myocardium or prior myocardial    infarction.   Normal left ventricular size, ejection fraction, and wall motion.   ECG INTERPRETATION   Negative stress ECG for ischemia.    Event monitor 5/2022  Procedure: biotel monitor; 5d 23h; 4/13/22   Indication: ?paroxysmal AF   Quality: good   Findings:   Underlying rhythm: Predominantly sinus rhythm with average rate 73 bpm. No atrial fibrillation nor flutter.   Atrial events: Rare ectopy.   Ventricular events: Rare ectopy.   Patient events: No patient triggered events.   Impressions:   No atrial fibrillation nor flutter detected.     TTE 10/2022  CONCLUSIONS  Normal left ventricular size, wall thickness, and systolic function.  Prior echocardiogram 2/27/2022, now without evidence of intracavitary   gradient.    TTE 5/21/24  CONCLUSIONS  Compared to the prior study on 10/1/2022.   Moderate concentric LVH and   noted elevated RV systolic pressure.   Normal left ventricular chamber size. Moderate concentric left   ventricular hypertrophy. Normal left ventricular systolic function. The   left ventricular ejection fraction is visually estimated to be 60-65%.   Normal regional wall motion. Normal diastolic function.  Mild tricuspid regurgitation with elevated RV systolic pressure of 35   mmHg.      Medical Decision Making:  Problem List Items Addressed This Visit       Paroxysmal atrial fibrillation (HCC)    Hyperlipidemia       Atrial fibrillation, paroxysmal - recurrence on loop recorder 1/11/23 7319. Continue doac for cva prevention; chadsvasc 2. Metoprolol for rate control.    Diastolic HF - NYHA I - stable off lasix. Monitor.       It was my pleasure to meet with Ms. Haro.

## 2024-06-11 ENCOUNTER — HOSPITAL ENCOUNTER (EMERGENCY)
Facility: MEDICAL CENTER | Age: 49
End: 2024-06-11
Attending: EMERGENCY MEDICINE
Payer: MEDICAID

## 2024-06-11 ENCOUNTER — APPOINTMENT (OUTPATIENT)
Dept: RADIOLOGY | Facility: MEDICAL CENTER | Age: 49
End: 2024-06-11
Attending: EMERGENCY MEDICINE
Payer: MEDICAID

## 2024-06-11 VITALS
TEMPERATURE: 97.6 F | SYSTOLIC BLOOD PRESSURE: 126 MMHG | RESPIRATION RATE: 22 BRPM | WEIGHT: 279 LBS | OXYGEN SATURATION: 99 % | BODY MASS INDEX: 52.72 KG/M2 | HEART RATE: 72 BPM | DIASTOLIC BLOOD PRESSURE: 60 MMHG

## 2024-06-11 DIAGNOSIS — E86.0 DEHYDRATION: ICD-10-CM

## 2024-06-11 DIAGNOSIS — L03.116 CELLULITIS OF LEFT ANTERIOR LOWER LEG: ICD-10-CM

## 2024-06-11 DIAGNOSIS — R11.0 NAUSEA: ICD-10-CM

## 2024-06-11 DIAGNOSIS — R19.7 DIARRHEA OF PRESUMED INFECTIOUS ORIGIN: ICD-10-CM

## 2024-06-11 DIAGNOSIS — J02.9 PHARYNGITIS, UNSPECIFIED ETIOLOGY: ICD-10-CM

## 2024-06-11 LAB
ALBUMIN SERPL BCP-MCNC: 3.7 G/DL (ref 3.2–4.9)
ALBUMIN/GLOB SERPL: 1.2 G/DL
ALP SERPL-CCNC: 122 U/L (ref 30–99)
ALT SERPL-CCNC: 16 U/L (ref 2–50)
ANION GAP SERPL CALC-SCNC: 14 MMOL/L (ref 7–16)
AST SERPL-CCNC: 10 U/L (ref 12–45)
BASOPHILS # BLD AUTO: 0.1 % (ref 0–1.8)
BASOPHILS # BLD: 0.01 K/UL (ref 0–0.12)
BILIRUB SERPL-MCNC: 0.7 MG/DL (ref 0.1–1.5)
BUN SERPL-MCNC: 11 MG/DL (ref 8–22)
CALCIUM ALBUM COR SERPL-MCNC: 10 MG/DL (ref 8.5–10.5)
CALCIUM SERPL-MCNC: 9.8 MG/DL (ref 8.5–10.5)
CHLORIDE SERPL-SCNC: 108 MMOL/L (ref 96–112)
CO2 SERPL-SCNC: 24 MMOL/L (ref 20–33)
CREAT SERPL-MCNC: 0.99 MG/DL (ref 0.5–1.4)
EKG IMPRESSION: NORMAL
EOSINOPHIL # BLD AUTO: 0.11 K/UL (ref 0–0.51)
EOSINOPHIL NFR BLD: 1.1 % (ref 0–6.9)
ERYTHROCYTE [DISTWIDTH] IN BLOOD BY AUTOMATED COUNT: 55.7 FL (ref 35.9–50)
GFR SERPLBLD CREATININE-BSD FMLA CKD-EPI: 69 ML/MIN/1.73 M 2
GLOBULIN SER CALC-MCNC: 3.2 G/DL (ref 1.9–3.5)
GLUCOSE SERPL-MCNC: 99 MG/DL (ref 65–99)
HCT VFR BLD AUTO: 39.9 % (ref 37–47)
HGB BLD-MCNC: 12 G/DL (ref 12–16)
IMM GRANULOCYTES # BLD AUTO: 0.03 K/UL (ref 0–0.11)
IMM GRANULOCYTES NFR BLD AUTO: 0.3 % (ref 0–0.9)
LIPASE SERPL-CCNC: 19 U/L (ref 11–82)
LYMPHOCYTES # BLD AUTO: 2.02 K/UL (ref 1–4.8)
LYMPHOCYTES NFR BLD: 19.7 % (ref 22–41)
MCH RBC QN AUTO: 27.4 PG (ref 27–33)
MCHC RBC AUTO-ENTMCNC: 30.1 G/DL (ref 32.2–35.5)
MCV RBC AUTO: 91.1 FL (ref 81.4–97.8)
MONOCYTES # BLD AUTO: 0.66 K/UL (ref 0–0.85)
MONOCYTES NFR BLD AUTO: 6.4 % (ref 0–13.4)
NEUTROPHILS # BLD AUTO: 7.43 K/UL (ref 1.82–7.42)
NEUTROPHILS NFR BLD: 72.4 % (ref 44–72)
NRBC # BLD AUTO: 0 K/UL
NRBC BLD-RTO: 0 /100 WBC (ref 0–0.2)
PLATELET # BLD AUTO: 230 K/UL (ref 164–446)
PMV BLD AUTO: 10.8 FL (ref 9–12.9)
POTASSIUM SERPL-SCNC: 3.7 MMOL/L (ref 3.6–5.5)
PROT SERPL-MCNC: 6.9 G/DL (ref 6–8.2)
RBC # BLD AUTO: 4.38 M/UL (ref 4.2–5.4)
S PYO DNA SPEC NAA+PROBE: NOT DETECTED
SODIUM SERPL-SCNC: 146 MMOL/L (ref 135–145)
TROPONIN T SERPL-MCNC: 27 NG/L (ref 6–19)
WBC # BLD AUTO: 10.3 K/UL (ref 4.8–10.8)

## 2024-06-11 PROCEDURE — 99284 EMERGENCY DEPT VISIT MOD MDM: CPT

## 2024-06-11 PROCEDURE — 93005 ELECTROCARDIOGRAM TRACING: CPT | Performed by: EMERGENCY MEDICINE

## 2024-06-11 PROCEDURE — A9270 NON-COVERED ITEM OR SERVICE: HCPCS | Mod: UD | Performed by: EMERGENCY MEDICINE

## 2024-06-11 PROCEDURE — 71045 X-RAY EXAM CHEST 1 VIEW: CPT

## 2024-06-11 PROCEDURE — 80053 COMPREHEN METABOLIC PANEL: CPT

## 2024-06-11 PROCEDURE — 85025 COMPLETE CBC W/AUTO DIFF WBC: CPT

## 2024-06-11 PROCEDURE — 93005 ELECTROCARDIOGRAM TRACING: CPT

## 2024-06-11 PROCEDURE — 87651 STREP A DNA AMP PROBE: CPT

## 2024-06-11 PROCEDURE — 36415 COLL VENOUS BLD VENIPUNCTURE: CPT

## 2024-06-11 PROCEDURE — 700111 HCHG RX REV CODE 636 W/ 250 OVERRIDE (IP): Mod: JZ,UD | Performed by: EMERGENCY MEDICINE

## 2024-06-11 PROCEDURE — 700105 HCHG RX REV CODE 258: Mod: UD | Performed by: EMERGENCY MEDICINE

## 2024-06-11 PROCEDURE — 84484 ASSAY OF TROPONIN QUANT: CPT

## 2024-06-11 PROCEDURE — 700102 HCHG RX REV CODE 250 W/ 637 OVERRIDE(OP): Mod: UD | Performed by: EMERGENCY MEDICINE

## 2024-06-11 PROCEDURE — 96374 THER/PROPH/DIAG INJ IV PUSH: CPT

## 2024-06-11 PROCEDURE — 83690 ASSAY OF LIPASE: CPT

## 2024-06-11 RX ORDER — CLINDAMYCIN HYDROCHLORIDE 300 MG/1
300 CAPSULE ORAL 3 TIMES DAILY
Qty: 15 CAPSULE | Refills: 0 | Status: ACTIVE | OUTPATIENT
Start: 2024-06-11 | End: 2024-06-16

## 2024-06-11 RX ORDER — CLINDAMYCIN HYDROCHLORIDE 150 MG/1
300 CAPSULE ORAL ONCE
Status: COMPLETED | OUTPATIENT
Start: 2024-06-11 | End: 2024-06-11

## 2024-06-11 RX ORDER — ONDANSETRON 2 MG/ML
4 INJECTION INTRAMUSCULAR; INTRAVENOUS ONCE
Status: COMPLETED | OUTPATIENT
Start: 2024-06-11 | End: 2024-06-11

## 2024-06-11 RX ORDER — SODIUM CHLORIDE 9 MG/ML
1000 INJECTION, SOLUTION INTRAVENOUS ONCE
Status: COMPLETED | OUTPATIENT
Start: 2024-06-11 | End: 2024-06-11

## 2024-06-11 RX ADMIN — ONDANSETRON 4 MG: 2 INJECTION INTRAMUSCULAR; INTRAVENOUS at 20:09

## 2024-06-11 RX ADMIN — CLINDAMYCIN HYDROCHLORIDE 300 MG: 150 CAPSULE ORAL at 22:28

## 2024-06-11 RX ADMIN — SODIUM CHLORIDE 1000 ML: 9 INJECTION, SOLUTION INTRAVENOUS at 20:09

## 2024-06-11 ASSESSMENT — FIBROSIS 4 INDEX: FIB4 SCORE: 0.6

## 2024-06-11 NOTE — ED TRIAGE NOTES
Pt comes in complaining of weakness and dizziness. Pt reporting nausea as well. Symptoms started Sunday but she was improving until today.

## 2024-06-12 ENCOUNTER — APPOINTMENT (OUTPATIENT)
Dept: RADIOLOGY | Facility: MEDICAL CENTER | Age: 49
End: 2024-06-12
Attending: STUDENT IN AN ORGANIZED HEALTH CARE EDUCATION/TRAINING PROGRAM
Payer: MEDICAID

## 2024-06-12 ENCOUNTER — HOSPITAL ENCOUNTER (EMERGENCY)
Facility: MEDICAL CENTER | Age: 49
End: 2024-06-12
Attending: STUDENT IN AN ORGANIZED HEALTH CARE EDUCATION/TRAINING PROGRAM
Payer: MEDICAID

## 2024-06-12 VITALS
BODY MASS INDEX: 52.86 KG/M2 | HEIGHT: 61 IN | DIASTOLIC BLOOD PRESSURE: 63 MMHG | SYSTOLIC BLOOD PRESSURE: 120 MMHG | WEIGHT: 279.98 LBS | OXYGEN SATURATION: 94 % | TEMPERATURE: 98.3 F | RESPIRATION RATE: 18 BRPM | HEART RATE: 70 BPM

## 2024-06-12 DIAGNOSIS — S60.221A CONTUSION OF DORSUM OF RIGHT HAND: ICD-10-CM

## 2024-06-12 PROCEDURE — 700102 HCHG RX REV CODE 250 W/ 637 OVERRIDE(OP): Mod: UD | Performed by: STUDENT IN AN ORGANIZED HEALTH CARE EDUCATION/TRAINING PROGRAM

## 2024-06-12 PROCEDURE — 73130 X-RAY EXAM OF HAND: CPT | Mod: RT

## 2024-06-12 PROCEDURE — A9270 NON-COVERED ITEM OR SERVICE: HCPCS | Mod: UD | Performed by: STUDENT IN AN ORGANIZED HEALTH CARE EDUCATION/TRAINING PROGRAM

## 2024-06-12 PROCEDURE — 99284 EMERGENCY DEPT VISIT MOD MDM: CPT

## 2024-06-12 RX ORDER — NAPROXEN 375 MG/1
375 TABLET ORAL 2 TIMES DAILY WITH MEALS
Qty: 14 TABLET | Refills: 0 | Status: SHIPPED | OUTPATIENT
Start: 2024-06-12 | End: 2024-06-19

## 2024-06-12 RX ORDER — ACETAMINOPHEN 325 MG/1
650 TABLET ORAL ONCE
Status: COMPLETED | OUTPATIENT
Start: 2024-06-12 | End: 2024-06-12

## 2024-06-12 RX ADMIN — ACETAMINOPHEN 650 MG: 325 TABLET, FILM COATED ORAL at 21:31

## 2024-06-12 ASSESSMENT — PAIN DESCRIPTION - PAIN TYPE: TYPE: ACUTE PAIN

## 2024-06-12 ASSESSMENT — FIBROSIS 4 INDEX: FIB4 SCORE: 0.54

## 2024-06-12 NOTE — ED PROVIDER NOTES
ED Provider Note    CHIEF COMPLAINT  Chief Complaint   Patient presents with    Weakness    Dizziness       EXTERNAL RECORDS REVIEWED  Outpatient Notes reviewed cardiology progress note by Dr. Borrego.  Patient has history of paroxysmal atrial fibrillation and diastolic heart failure.  Plan to continue doac and metoprolol.    HPI/ROS  LIMITATION TO HISTORY   Select: : None  OUTSIDE HISTORIAN(S):  None available    Lorene Haro is a 48 y.o. female with history of atrial fibrillation who presents for evaluation of dizziness.  Patient has a history of COPD and atrial fibrillation.  She relates she recently quit smoking.  She endorses however she has been feeling ill for about 6 days.  She endorses feeling generally weak and lightheaded, she notes nausea more so when she eats, and has had diarrhea for 2 days.  She relates she is not feeling out of breath but has had a cough and sore throat and notes today she began to have chest pain to the anterior chest.  She relates the pain is pressure-like, she is not having any palpitations.  Given feeling generally ill and developing acute chest discomfort today with history of atrial fibrillation she came to be assessed.    PAST MEDICAL HISTORY   has a past medical history of A-fib ([facility]), MONI (acute kidney injury) ([facility]) (8/9/2023), Asthma, Bipolar 2 disorder ([facility]), Chickenpox, Chronic obstructive pulmonary disease (HCC), Hypertension, On home oxygen therapy, Other psychological stress, Schizophrenic disorder ([facility]), and Yeast infection of the skin (04/01/2021).    SURGICAL HISTORY   has a past surgical history that includes hysterectomy laparoscopy; colectomy; cholecystectomy; and knee arthroscopy (Left).    FAMILY HISTORY  Family History   Problem Relation Age of Onset    No Known Problems Mother     Cancer Sister        SOCIAL HISTORY  Social History     Tobacco Use    Smoking status: Some Days     Current packs/day: 0.00     Types: Cigarettes     Last attempt to quit:  10/12/2022     Years since quittin.6    Smokeless tobacco: Never   Vaping Use    Vaping status: Never Used   Substance and Sexual Activity    Alcohol use: Not Currently    Drug use: Never    Sexual activity: Not Currently       CURRENT MEDICATIONS  Home Medications    **Home medications have not yet been reviewed for this encounter**         ALLERGIES  Allergies   Allergen Reactions    Penicillin G Rash and Itching     pt reports that she gets a rash all over her body and gets itchy    Amoxicillin Rash and Itching     Tolerates cephalosporins, pt reports that she gets a rash all over her body and gets itchy       PHYSICAL EXAM  VITAL SIGNS: /60   Pulse 72   Temp 36.4 °C (97.6 °F) (Temporal)   Resp (!) 22   Wt (!) 127 kg (279 lb)   LMP  (LMP Unknown)   SpO2 99%   BMI 52.72 kg/m²    General: Alert, no acute distress  Skin: Warm, dry, normal for ethnicity  Head: Normocephalic, atraumatic  Neck: Trachea midline, no tenderness  Eye: PERRL, normal conjunctiva  ENMT: Oral mucosa pink and dry, uvula midline without shift, mild pharyngeal erythema  Cardiovascular: Regular rate and rhythm, No murmur, Normal peripheral perfusion  Respiratory: Lungs CTA, respirations are non-labored, breath sounds are equal  Gastrointestinal: non distended  Musculoskeletal: Mild swelling without pitting noted to the bilateral anterior tibial regions more on the left, there is erythema and palpable warmth, again more so on the left.  No fluctuance, no proximal streaking, consistent with cellulitis.  Reproducible tenderness to the chest without step-off or crepitus.  Neurological: Alert and oriented to person, place, time, and situation  Lymphatics: No lymphadenopathy  Psychiatric: Cooperative, appropriate mood & affect     EKG/LABS  Results for orders placed or performed during the hospital encounter of 24   CBC w/ Differential   Result Value Ref Range    WBC 10.3 4.8 - 10.8 K/uL    RBC 4.38 4.20 - 5.40 M/uL    Hemoglobin  12.0 12.0 - 16.0 g/dL    Hematocrit 39.9 37.0 - 47.0 %    MCV 91.1 81.4 - 97.8 fL    MCH 27.4 27.0 - 33.0 pg    MCHC 30.1 (L) 32.2 - 35.5 g/dL    RDW 55.7 (H) 35.9 - 50.0 fL    Platelet Count 230 164 - 446 K/uL    MPV 10.8 9.0 - 12.9 fL    Neutrophils-Polys 72.40 (H) 44.00 - 72.00 %    Lymphocytes 19.70 (L) 22.00 - 41.00 %    Monocytes 6.40 0.00 - 13.40 %    Eosinophils 1.10 0.00 - 6.90 %    Basophils 0.10 0.00 - 1.80 %    Immature Granulocytes 0.30 0.00 - 0.90 %    Nucleated RBC 0.00 0.00 - 0.20 /100 WBC    Neutrophils (Absolute) 7.43 (H) 1.82 - 7.42 K/uL    Lymphs (Absolute) 2.02 1.00 - 4.80 K/uL    Monos (Absolute) 0.66 0.00 - 0.85 K/uL    Eos (Absolute) 0.11 0.00 - 0.51 K/uL    Baso (Absolute) 0.01 0.00 - 0.12 K/uL    Immature Granulocytes (abs) 0.03 0.00 - 0.11 K/uL    NRBC (Absolute) 0.00 K/uL   Group A Strep by PCR    Specimen: Throat   Result Value Ref Range    Group A Strep by PCR Not Detected Not Detected   Complete Metabolic Panel (CMP)   Result Value Ref Range    Sodium 146 (H) 135 - 145 mmol/L    Potassium 3.7 3.6 - 5.5 mmol/L    Chloride 108 96 - 112 mmol/L    Co2 24 20 - 33 mmol/L    Anion Gap 14.0 7.0 - 16.0    Glucose 99 65 - 99 mg/dL    Bun 11 8 - 22 mg/dL    Creatinine 0.99 0.50 - 1.40 mg/dL    Calcium 9.8 8.5 - 10.5 mg/dL    Correct Calcium 10.0 8.5 - 10.5 mg/dL    AST(SGOT) 10 (L) 12 - 45 U/L    ALT(SGPT) 16 2 - 50 U/L    Alkaline Phosphatase 122 (H) 30 - 99 U/L    Total Bilirubin 0.7 0.1 - 1.5 mg/dL    Albumin 3.7 3.2 - 4.9 g/dL    Total Protein 6.9 6.0 - 8.2 g/dL    Globulin 3.2 1.9 - 3.5 g/dL    A-G Ratio 1.2 g/dL   Lipase   Result Value Ref Range    Lipase 19 11 - 82 U/L   Troponin - STAT Once   Result Value Ref Range    Troponin T 27 (H) 6 - 19 ng/L   ESTIMATED GFR   Result Value Ref Range    GFR (CKD-EPI) 69 >60 mL/min/1.73 m 2   EKG   Result Value Ref Range    Report       [facility]    Test Date:  2024-06-11  Pt Name:    ADELINA MILLAN                 Department: ER  MRN:        [id number]                      Room:       NM 71  Gender:     Female                       Technician: [id number]  :        1975                   Requested By:ER TRIAGE PROTOCOL  Order #:    [id number]                    Reading MD: TRAV WALTON MD    Measurements  Intervals                                Axis  Rate:       77                           P:          103  NM:         131                          QRS:        48  QRSD:       108                          T:          0  QT:         374  QTc:        424    Interpretive Statements  Sinus rhythm  Atrial premature complex  Low voltage, precordial leads  Compared to ECG 2024 16:29:09  Right ventricular hypertrophy no longer present  Electronically Signed On 2024 19:36:28 PDT by TRAV WALTON MD       *Note: Due to a large number of results and/or encounters for the requested time period, some results have not been displayed. A complete set of results can be found in Results Review.      I have independently interpreted this EKG    RADIOLOGY/PROCEDURES   I have independently interpreted the diagnostic imaging associated with this visit and am waiting the final reading from the radiologist.   My preliminary interpretation is as follows: No lobar infiltrate nor effusion    Radiologist interpretation:  DX-CHEST-PORTABLE (1 VIEW)   Final Result      No acute cardiac or pulmonary abnormalities are identified.          COURSE & MEDICAL DECISION MAKING    ASSESSMENT, COURSE AND PLAN  Care Narrative: This patient is a very pleasant 48-year-old female with history of paroxysmal atrial fibrillation and COPD who presents for evaluation of chest discomfort.  She notes preceding diarrheal disease and feeling generally weak and lightheaded.  Reassuringly though her troponin is out of range high it is approximating her previous baseline.  EKG is also reassuring and nonischemic.  No evidence of dysrhythmia or atrial  fibrillation at this time.  I suspect dehydration secondary to her diarrhea, indeed she does appear to be mildly hemoconcentrated, sodium is 146.  Thankfully no other actionable electrolyte abnormality.  No evidence of pancreatitis.  Abdominal exam is reassuring without peritoneal signs.  I suspect likely viral illness and resulting dehydration of the etiology of her symptoms.  However she does have clear evidence of cellulitis on exam of the lower extremities and for this reason antibiotics will be initiated.  No evidence of sepsis, no evidence of acute coronary syndrome, no evidence of pneumonia.  As such no indication for inpatient management.    Chest Pain: Heart score is 3, low risk stratification  ED OBS: Yes; I am placing the patient in to an observation status due to a diagnostic uncertainty as well as therapeutic intensity. Patient placed in observation status at 7:38 PM, 6/11/2024.     Observation plan is as follows: Patient medicated with normal saline 1 L, Zofran 4 mg IV.  Metabolic/cardiac workup will be obtained as well as strep PCR.  Differential diagnosis at this point includes but is not restricted to chest wall pain, low bronchitis, lower respiratory infection, pharyngitis, dehydration, electrolyte derangement, gastroenteritis, cellulitis    2214: Patient reassessed, feeling better, I have updated her with reassuring studies.    Upon Reevaluation, the patient's condition has: Improved; and will be discharged.    Patient discharged from ED Observation status at 2232 (Time) 6/11/24 (Date).   Hydration: Based on the patient's presentation of Acute Diarrhea and Dehydration the patient was given IV fluids. IV Hydration was used because oral hydration was not as rapid as required. Upon recheck following hydration, the patient was doing better, her skin tone is improving with IV fluids.      Patient Vitals for the past 24 hrs:   BP Temp Temp src Pulse Resp SpO2 Weight   06/11/24 2232 126/60 36.4 °C (97.6  °F) Temporal 72 (!) 22 99 % --   06/11/24 2100 127/60 -- -- 72 (!) 24 89 % --   06/11/24 2000 127/60 -- -- 65 18 95 % --   06/11/24 1645 -- -- -- -- -- -- (!) 127 kg (279 lb)   06/11/24 1631 128/63 36.4 °C (97.5 °F) Temporal 70 16 91 % --        ADDITIONAL PROBLEMS MANAGED  Chest discomfort with nonproductive cough, sore throat, generalized weakness, dehydration, cellulitis, diarrheal illness    DISPOSITION AND DISCUSSIONS  I have discussed management of the patient with the following physicians and JAY's:  NA    Discussion of management with other [facility] or appropriate source(s): None     Escalation of care considered, and ultimately not performed:acute inpatient care management, however at this time, the patient is most appropriate for outpatient management    Barriers to care at this time, including but not limited to:  NA .     Decision tools and prescription drugs considered including, but not limited to: Antibiotics clindamycin initiated for cellulitis given allergies to penicillin .    The patient will return for new or worsening symptoms and is stable at the time of discharge.    Patient has had high blood pressure while in the emergency department, felt likely secondary to medical condition. Counseled patient to monitor blood pressure at home and follow up with primary care physician.      DISPOSITION:  Patient will be discharged home in stable condition.    FOLLOW UP:  [facility]  [address] [place] [id number]  [phone]  Schedule an appointment as soon as possible for a visit         OUTPATIENT MEDICATIONS:  Discharge Medication List as of 6/11/2024 10:33 PM        START taking these medications    Details   clindamycin (CLEOCIN) 300 MG Cap Take 1 Capsule by mouth 3 times a day for 5 days., Disp-15 Capsule, R-0, Normal                FINAL DIAGNOSIS  1. Cellulitis of left anterior lower leg    2. Dehydration    3. Pharyngitis, unspecified etiology    4. Diarrhea of presumed  infectious origin    5. Nausea           Electronically signed by: Kirsty Perkins M.D., 6/11/2024 7:36 PM

## 2024-06-12 NOTE — ED NOTES
Patient DC home with instructions on cellulitis. She is ambulatory on DC. Educated on how to take her prescribed medications.  She is going to call [facility] for transport.

## 2024-06-13 NOTE — ED PROVIDER NOTES
ED Provider Note    CHIEF COMPLAINT  Chief Complaint   Patient presents with    Other     Rt hand pain       HPI/ROS  LIMITATION TO HISTORY   Select: : None  OUTSIDE HISTORIAN(S):  EMS transported the patient for right hand pain    Lorene Haro is a 48 y.o. female who presents with right hand pain.  Patient reports she woke up and experienced pain.  Patient reports it is dull.  Patient reports that it radiates up her arm.  Patient denies weakness or numbness of the right upper extremity or neck pain.  Patient denies fevers chills body aches or sweats.  Patient does not remember hitting it against anything.    PAST MEDICAL HISTORY   has a past medical history of A-fib ([facility]), MONI (acute kidney injury) ([facility]) (2023), Asthma, Bipolar 2 disorder (HCC), Chickenpox, Chronic obstructive pulmonary disease (HCC), Hypertension, On home oxygen therapy, Other psychological stress, Schizophrenic disorder ([facility]), and Yeast infection of the skin (2021).    SURGICAL HISTORY   has a past surgical history that includes hysterectomy laparoscopy; colectomy; cholecystectomy; and knee arthroscopy (Left).    FAMILY HISTORY  Family History   Problem Relation Age of Onset    No Known Problems Mother     Cancer Sister        SOCIAL HISTORY  Social History     Tobacco Use    Smoking status: Some Days     Current packs/day: 0.00     Types: Cigarettes     Last attempt to quit: 10/12/2022     Years since quittin.6    Smokeless tobacco: Never   Vaping Use    Vaping status: Never Used   Substance and Sexual Activity    Alcohol use: Not Currently    Drug use: Never    Sexual activity: Not Currently       CURRENT MEDICATIONS  Home Medications       Reviewed by Nicolle Bridges R.N. (Registered Nurse) on 24 at 2045  Med List Status: Not Addressed     Medication Last Dose Status   ADVAIR DISKUS 100-50 MCG/ACT AEROSOL POWDER, BREATH ACTIVATED  Active   albuterol 108 (90 Base) MCG/ACT Aero Soln inhalation aerosol  Active  "  apixaban (ELIQUIS) 5mg Tab  Active   aripiprazole (ABILIFY) 30 MG tablet  Active   atorvastatin (LIPITOR) 20 MG Tab  Active   benzonatate (TESSALON) 100 MG Cap  Active   clindamycin (CLEOCIN) 300 MG Cap  Active   fluticasone (FLONASE) 50 MCG/ACT nasal spray  Active   guaiFENesin (ROBITUSSIN) 100 MG/5ML liquid  Active   Home Care Oxygen  Active   metoprolol SR (TOPROL XL) 25 MG TABLET SR 24 HR  Active   montelukast (SINGULAIR) 10 MG Tab  Active                  Audit from Redirected Encounters    **Home medications have not yet been reviewed for this encounter**         ALLERGIES  Allergies   Allergen Reactions    Penicillin G Rash and Itching     pt reports that she gets a rash all over her body and gets itchy    Amoxicillin Rash and Itching     Tolerates cephalosporins, pt reports that she gets a rash all over her body and gets itchy       PHYSICAL EXAM  VITAL SIGNS: /57   Pulse 72   Temp 36.4 °C (97.6 °F) (Temporal)   Resp 20   Ht 1.549 m (5' 1\")   Wt (!) 127 kg (279 lb 15.8 oz)   LMP  (LMP Unknown)   SpO2 92%   BMI 52.90 kg/m²    Vitals and nursing note reviewed.   Constitutional:       Comments: Patient is lying in bed supine, pleasant, conversant, speaking in complete sentences   HENT:      Head: Normocephalic and atraumatic.   Eyes:      Extraocular Movements: Extraocular movements intact.      Conjunctiva/sclera: Conjunctivae normal.      Pupils: Pupils are equal, round, and reactive to light.   Cardiovascular:      Pulses: Normal pulses.      Comments: HR 72  Pulmonary:      Effort: Pulmonary effort is normal. No respiratory distress.   Musculoskeletal:         General: No snuffbox tenderness to palpation no swelling. Normal range of motion.  Slight ecchymosis of the dorsum of the right hand, tender to palpation.  No crepitus. Sensation intact at first dorsal web space, index finger volar tip and little finger volar tip. Wrist extension, thumb extension, thumb abduction, thumb opposition, and " finger abduction/adduction motor functions intact. 2+ radial and ulnar pulses. Normal capillary refill at fingertips.  Cervical back: Normal range of motion. No rigidity.   Skin:     General: Skin is warm and dry.      Capillary Refill: Capillary refill takes less than 2 seconds.   Neurological:      Mental Status: Alert.       RADIOLOGY/PROCEDURES   I have independently interpreted the diagnostic imaging associated with this visit and am waiting the final reading from the radiologist.   My preliminary interpretation is as follows: No fracture    Radiologist interpretation:  DX-HAND 3+ RIGHT   Final Result         1.  No acute traumatic bony injury.          COURSE & MEDICAL DECISION MAKING    ASSESSMENT, COURSE AND PLAN  Care Narrative: Hand x-ray to evaluate for fracture versus dislocation.  No evidence of tendon rupture, neurovascular compromise on exam.  No evidence of necrotizing soft tissue infection.  No cellulitis, abscess identified.  Likely contusion versus fracture.    Electronically signed by: Cirilo Looney M.D., 6/12/2024 9:39 PM    X-ray without evidence of fracture or dislocation.  Patient feeling better after Tylenol.  Patient will be discharged home with outpatient anti-inflammatories.  Patient counseled on rest, ice, compression, elevation.  No snuffbox tenderness, scaphoid fracture inconsistent with patient presentation at this time.    Repeat physical exam benign.  I doubt any serious emergency process at this time.  Patient and/or family, friends given strict return precautions for worsening symptoms and care instructions. They have demonstrated understanding of discharge instructions through teach back mechanism. Advised PCP follow-up in 1-2 days.  Patient/family/friend expresses understanding and agrees to plan.    This dictation has been created using voice recognition software. I am continuously working with the software to minimize the number of voice recognition errors and I have  made every attempt to manually correct the errors within my dictation. However errors  related to this voice recognition software may still exist and should be interpreted within the appropriate context.     Electronically signed by: Cirilo Looney M.D., 6/12/2024 10:09 PM      DISPOSITION AND DISCUSSIONS    Barriers to care at this time, including but not limited to: Patient does not have established PCP.     Decision tools and prescription drugs considered including, but not limited to: Pain Medications   over-the-counter pain medications are appropriate, narcotics not indicated at this time  .    FINAL DIAGNOSIS  1. Contusion of dorsum of right hand           Electronically signed by: Cirilo Looney M.D., 6/12/2024 9:38 PM

## 2024-06-13 NOTE — ED TRIAGE NOTES
Pt BIB [facility] for rt hand pain. Sts that she was either bit by a spider or hit her hand. No redness or swelling noted. CMS intact. Pt iplaced in lobby in WC.

## 2024-06-26 ENCOUNTER — SLEEP STUDY (OUTPATIENT)
Dept: SLEEP MEDICINE | Facility: MEDICAL CENTER | Age: 49
End: 2024-06-26
Payer: MEDICAID

## 2024-06-26 DIAGNOSIS — G47.33 OSA (OBSTRUCTIVE SLEEP APNEA): ICD-10-CM

## 2024-06-26 PROCEDURE — 95811 POLYSOM 6/>YRS CPAP 4/> PARM: CPT | Performed by: STUDENT IN AN ORGANIZED HEALTH CARE EDUCATION/TRAINING PROGRAM

## 2024-06-27 ENCOUNTER — APPOINTMENT (OUTPATIENT)
Dept: RADIOLOGY | Facility: MEDICAL CENTER | Age: 49
End: 2024-06-27
Attending: EMERGENCY MEDICINE
Payer: MEDICAID

## 2024-06-27 ENCOUNTER — HOSPITAL ENCOUNTER (EMERGENCY)
Facility: MEDICAL CENTER | Age: 49
End: 2024-06-28
Attending: EMERGENCY MEDICINE
Payer: MEDICAID

## 2024-06-27 DIAGNOSIS — M79.662 PAIN IN LEFT LOWER LEG: ICD-10-CM

## 2024-06-27 DIAGNOSIS — L03.116 CELLULITIS OF LEFT LOWER LEG: ICD-10-CM

## 2024-06-27 DIAGNOSIS — R20.2 PARESTHESIA: ICD-10-CM

## 2024-06-27 LAB
ALBUMIN SERPL BCP-MCNC: 4.1 G/DL (ref 3.2–4.9)
ALBUMIN/GLOB SERPL: 1.5 G/DL
ALP SERPL-CCNC: 131 U/L (ref 30–99)
ALT SERPL-CCNC: 7 U/L (ref 2–50)
ANION GAP SERPL CALC-SCNC: 10 MMOL/L (ref 7–16)
APTT PPP: 33.3 SEC (ref 24.7–36)
AST SERPL-CCNC: 7 U/L (ref 12–45)
BASOPHILS # BLD AUTO: 0.2 % (ref 0–1.8)
BASOPHILS # BLD: 0.02 K/UL (ref 0–0.12)
BILIRUB SERPL-MCNC: 0.8 MG/DL (ref 0.1–1.5)
BUN SERPL-MCNC: 19 MG/DL (ref 8–22)
CALCIUM ALBUM COR SERPL-MCNC: 9.6 MG/DL (ref 8.5–10.5)
CALCIUM SERPL-MCNC: 9.7 MG/DL (ref 8.5–10.5)
CHLORIDE SERPL-SCNC: 108 MMOL/L (ref 96–112)
CO2 SERPL-SCNC: 25 MMOL/L (ref 20–33)
CREAT SERPL-MCNC: 1 MG/DL (ref 0.5–1.4)
EOSINOPHIL # BLD AUTO: 0.07 K/UL (ref 0–0.51)
EOSINOPHIL NFR BLD: 0.6 % (ref 0–6.9)
ERYTHROCYTE [DISTWIDTH] IN BLOOD BY AUTOMATED COUNT: 53.7 FL (ref 35.9–50)
GFR SERPLBLD CREATININE-BSD FMLA CKD-EPI: 68 ML/MIN/1.73 M 2
GLOBULIN SER CALC-MCNC: 2.8 G/DL (ref 1.9–3.5)
GLUCOSE SERPL-MCNC: 119 MG/DL (ref 65–99)
HCT VFR BLD AUTO: 37.7 % (ref 37–47)
HGB BLD-MCNC: 11.8 G/DL (ref 12–16)
IMM GRANULOCYTES # BLD AUTO: 0.04 K/UL (ref 0–0.11)
IMM GRANULOCYTES NFR BLD AUTO: 0.4 % (ref 0–0.9)
INR PPP: 1.34 (ref 0.87–1.13)
LYMPHOCYTES # BLD AUTO: 2.17 K/UL (ref 1–4.8)
LYMPHOCYTES NFR BLD: 19.8 % (ref 22–41)
MCH RBC QN AUTO: 28 PG (ref 27–33)
MCHC RBC AUTO-ENTMCNC: 31.3 G/DL (ref 32.2–35.5)
MCV RBC AUTO: 89.3 FL (ref 81.4–97.8)
MONOCYTES # BLD AUTO: 0.86 K/UL (ref 0–0.85)
MONOCYTES NFR BLD AUTO: 7.8 % (ref 0–13.4)
NEUTROPHILS # BLD AUTO: 7.81 K/UL (ref 1.82–7.42)
NEUTROPHILS NFR BLD: 71.2 % (ref 44–72)
NRBC # BLD AUTO: 0 K/UL
NRBC BLD-RTO: 0 /100 WBC (ref 0–0.2)
PLATELET # BLD AUTO: 213 K/UL (ref 164–446)
PMV BLD AUTO: 11.5 FL (ref 9–12.9)
POTASSIUM SERPL-SCNC: 4.1 MMOL/L (ref 3.6–5.5)
PROT SERPL-MCNC: 6.9 G/DL (ref 6–8.2)
PROTHROMBIN TIME: 16.7 SEC (ref 12–14.6)
RBC # BLD AUTO: 4.22 M/UL (ref 4.2–5.4)
SODIUM SERPL-SCNC: 143 MMOL/L (ref 135–145)
WBC # BLD AUTO: 11 K/UL (ref 4.8–10.8)

## 2024-06-27 PROCEDURE — 85025 COMPLETE CBC W/AUTO DIFF WBC: CPT

## 2024-06-27 PROCEDURE — 85610 PROTHROMBIN TIME: CPT

## 2024-06-27 PROCEDURE — 80053 COMPREHEN METABOLIC PANEL: CPT

## 2024-06-27 PROCEDURE — 36415 COLL VENOUS BLD VENIPUNCTURE: CPT

## 2024-06-27 PROCEDURE — A9270 NON-COVERED ITEM OR SERVICE: HCPCS | Mod: UD | Performed by: EMERGENCY MEDICINE

## 2024-06-27 PROCEDURE — 99285 EMERGENCY DEPT VISIT HI MDM: CPT

## 2024-06-27 PROCEDURE — 85730 THROMBOPLASTIN TIME PARTIAL: CPT

## 2024-06-27 PROCEDURE — 700102 HCHG RX REV CODE 250 W/ 637 OVERRIDE(OP): Mod: UD | Performed by: EMERGENCY MEDICINE

## 2024-06-27 RX ORDER — ACETAMINOPHEN 500 MG
1000 TABLET ORAL ONCE
Status: COMPLETED | OUTPATIENT
Start: 2024-06-27 | End: 2024-06-27

## 2024-06-27 RX ADMIN — ACETAMINOPHEN 1000 MG: 500 TABLET, FILM COATED ORAL at 23:15

## 2024-06-27 ASSESSMENT — FIBROSIS 4 INDEX: FIB4 SCORE: 0.54

## 2024-06-27 ASSESSMENT — PAIN DESCRIPTION - PAIN TYPE: TYPE: ACUTE PAIN

## 2024-06-27 NOTE — PROCEDURES
Patient: ADELINA MILLAN  ID: [id number] Date: 6/26/2024 Exam No.:   MONTAGE: Standard  STUDY TYPE: Treatment  RECORDING TECHNIQUE:   After the scalp was prepared, gold plated electrodes were applied to the scalp according to the International 10-20 System. EEG (electroencephalogram) was continuously monitored from the O1-M2, O2-M1, C3-M2, C4-M1, F3-M2, and F4-M1. EOGs (electrooculograms) were monitored by electrodes placed at the left and right outer canthi. Chin EMG (electromyogram) was monitored by electrodes placed on the mentalis and sub-mentalis muscles. Nasal and oral airflow were monitored using a triple port thermocouple as well as oronasal pressure transducer. Respiratory effort was measured by inductive plethysmography technology employing abdominal and thoracic belts. Blood oxygen saturation and pulse were monitored by pulse oximetry. Heart rhythm was monitored by surface electrocardiogram. Leg EMG was studied using surface electrodes placed on left and right anterior tibialis. A microphone was used to monitor tracheal sounds and snoring. Body position was monitored and documented by technician observation.   SCORING CRITERIA:   A modification of the AASM manual for scoring of sleep and associated events was used. Obstructive apneas were scored by cessation of airflow for at least 10 seconds with continuing respiratory effort. Central apneas were scored by cessation of airflow for at least 10 seconds with no respiratory effort. Hypopneas were scored by a 30% or more reduction in airflow for at least 10 seconds accompanied by arterial oxygen desaturation of 3% or an arousal. For CMS (Medicare) patients, per AASM rule 1B, hypopneas are scored by 30% with mild reduction in airflow for at least 10 seconds accompanied by arterial saturation decreased at 4%.  Study start time was 08:20:23 PM. Diagnostic recording time was 9h 21.0m with a total sleep time of 8h 30.0m resulting in a sleep efficiency of 90.91%%.  Sleep latency from the start of the study was 12 minutes and the latency from sleep to REM was 114 minutes. In total,111 arousals were scored for an arousal index of 13.1.  Respiratory:  There were a total of 0 apneas consisting of 0 obstructive apneas, 0 mixed apneas, and 0 central apneas. A total of 761 hypopneas were scored. The apnea index was 0.00 per hour and the hypopnea index was 89.53 per hour resulting in an overall AHI of 89.53. AHI during REM was 40.5 and AHI while supine was 89.53.  Oximetry:  There was a mean oxygen saturation of 88.0%. The minimum oxygen saturation in NREM was 76.0 % and in REM was 80.0%. The patient spent 305.6 minutes of TST with SaO2 <88%.  Cardiac:  The highest heart rate seen while awake was 82 BPM while the highest heart rate during sleep was 89 BPM with an average sleeping heart rate of 60 BPM.  Limb Movements:  There were a total of 14 PLMs during sleep which resulted in a PLMS index of 1.6. Of these, 28 were associated with arousals which resulted in a PLMS arousal index of 3.3.  Titration:   iVAPS was tried from Target [place]: 3.4, Tgt [place]: 16, EPAP: , PS: 4/20, Height: 155, Rise Time: 300, Ti: 2 / 0.3, Trigger: medium, and Cycle: medium to Target [place]: 5, Tgt [place]: 22, EPAP: 16, PS: 4/14, Height: 155, Rise Time: 300, Ti: 1.8/ 0.3, Trigger: High, and Cycle: medium.    This was a fully attended sleep study. This test was technically adequate. This patient was titrated on CPAP starting at *** cm of water pressure. Patient was titrated up to *** cm of water pressure. Patient did best at *** cm of water pressure. Patient spent *** minutes at that pressure and the AHI was *** which is considered *** obstructive sleep apnea.   Assessment:   ***Obstructive Sleep Apnea Hypopnea - AHI*** ***Nocturnal desaturation - marina saturation ***% - saturations <88% below for *** minutes of TST.  Recommendation:    improving respiratory events  4.  Supine REM sleep was seen during out of study    Recommendations:  I recommend iVAPS  Target [place]: 5.6, Tgt [place]: 18, EPAP: 16, PS: 4/14, Max pressure 30 cmH2O, Height: 155cm , Rise Time: 300, Ti min: 0.3, Ti max: 2.0, Trigger: High, and Cycle: medium.  Patient used a extra small Pipo mask during night of study.     I also recommend 60 day compliance download to assess the efficacy to the recommended pressure, measure leak, apnea hypopnea index and compliance for further outpatient monitoring and management of PAP therapy. In some cases alternative treatment options may be proven effective in resolving sleep apnea. These options include upper airway surgery, the use of a dental orthotic, weight loss, or positional therapy. Clinical correlation is required. In general patients with sleep apnea are advised to avoid alcohol, sedatives and not to operate a motor vehicle while drowsy.  Untreated sleep apnea increases the risk for cardiovascular and neurovascular disease.

## 2024-06-28 ENCOUNTER — PHARMACY VISIT (OUTPATIENT)
Dept: PHARMACY | Facility: MEDICAL CENTER | Age: 49
End: 2024-06-28
Payer: COMMERCIAL

## 2024-06-28 VITALS
DIASTOLIC BLOOD PRESSURE: 60 MMHG | OXYGEN SATURATION: 94 % | TEMPERATURE: 98.7 F | SYSTOLIC BLOOD PRESSURE: 136 MMHG | RESPIRATION RATE: 16 BRPM | HEART RATE: 76 BPM | WEIGHT: 280 LBS | BODY MASS INDEX: 52.87 KG/M2 | HEIGHT: 61 IN

## 2024-06-28 PROCEDURE — 93971 EXTREMITY STUDY: CPT | Mod: LT

## 2024-06-28 PROCEDURE — 700111 HCHG RX REV CODE 636 W/ 250 OVERRIDE (IP): Performed by: EMERGENCY MEDICINE

## 2024-06-28 PROCEDURE — 96374 THER/PROPH/DIAG INJ IV PUSH: CPT

## 2024-06-28 PROCEDURE — 700102 HCHG RX REV CODE 250 W/ 637 OVERRIDE(OP): Mod: UD | Performed by: EMERGENCY MEDICINE

## 2024-06-28 PROCEDURE — A9270 NON-COVERED ITEM OR SERVICE: HCPCS | Mod: UD | Performed by: EMERGENCY MEDICINE

## 2024-06-28 PROCEDURE — RXMED WILLOW AMBULATORY MEDICATION CHARGE: Performed by: EMERGENCY MEDICINE

## 2024-06-28 RX ORDER — TRAMADOL HYDROCHLORIDE 50 MG/1
50 TABLET ORAL EVERY 6 HOURS PRN
Qty: 8 TABLET | Refills: 0 | Status: SHIPPED | OUTPATIENT
Start: 2024-06-28 | End: 2024-06-30

## 2024-06-28 RX ORDER — CEFAZOLIN 2 G/1
2 INJECTION, POWDER, FOR SOLUTION INTRAMUSCULAR; INTRAVENOUS ONCE
Status: COMPLETED | OUTPATIENT
Start: 2024-06-28 | End: 2024-06-28

## 2024-06-28 RX ORDER — OXYCODONE HCL 10 MG/1
10 TABLET, FILM COATED, EXTENDED RELEASE ORAL ONCE
Status: COMPLETED | OUTPATIENT
Start: 2024-06-28 | End: 2024-06-28

## 2024-06-28 RX ORDER — CEPHALEXIN 500 MG/1
500 CAPSULE ORAL 4 TIMES DAILY
Qty: 40 CAPSULE | Refills: 0 | Status: ACTIVE | OUTPATIENT
Start: 2024-06-28 | End: 2024-07-12

## 2024-06-28 RX ADMIN — OXYCODONE HYDROCHLORIDE 10 MG: 10 TABLET, FILM COATED, EXTENDED RELEASE ORAL at 00:34

## 2024-06-28 RX ADMIN — CEFAZOLIN 2 G: 2 INJECTION, POWDER, FOR SOLUTION INTRAMUSCULAR; INTRAVENOUS at 01:36

## 2024-06-28 NOTE — ED TRIAGE NOTES
"  Chief Complaint   Patient presents with    Leg Pain    Numbness     BIBA from home for left lower leg pain x 2 days and numbness from the calf down. Pt reports the pain has gotten progressively worse and she now can't feel her calf or foot. Leg/foot are warm and dry, pulse present with doppler. Denies fevers. Hx a-fib and on Eliquis.      ./68   Pulse 70   Temp 36.8 °C (98.3 °F) (Oral)   Resp 15   Ht 1.549 m (5' 1\")   Wt (!) 127 kg (280 lb)   LMP  (LMP Unknown)   SpO2 95%   BMI 52.91 kg/m²     "

## 2024-06-28 NOTE — ED NOTES
Bedside report received from off going RN: Noy, assumed care of patient.  POC discussed with patient. Call light within reach, all needs addressed at this time.       Fall risk interventions in place: Move the patient closer to the nurse's station, Patient's personal possessions are with in their safe reach, Place fall risk sign on patient's door, and Keep floor surfaces clean and dry (all applicable per [place] Fall risk assessment)   Continuous monitoring: Cardiac Leads, Pulse Ox, or Blood Pressure  IVF/IV medications: Not Applicable   Oxygen: How many liters 2L, Traced the line to wall oxygen, and No oxygen tank in room  Bedside sitter: Not Applicable   Isolation: Not Applicable

## 2024-06-28 NOTE — DISCHARGE INSTRUCTIONS
Keep your leg elevated above the level of your heart  Warm compresses to your leg  Take the antibiotics until completely gone  Follow-up with your primary care doctor or [facility] within 1 week for recheck.  Return if fever greater than 101 or increasing redness and swelling

## 2024-06-28 NOTE — ED PROVIDER NOTES
ER Provider Note    Scribed for Dr. Albania Mora D.O. by Arlyn Bang. 6/27/2024  10:36 PM    Primary Care Provider: Pcp Unknown    CHIEF COMPLAINT  Chief Complaint   Patient presents with    Leg Pain    Numbness     BIBA from home for left lower leg pain x 2 days and numbness from the calf down. Pt reports the pain has gotten progressively worse and she now can't feel her calf or foot. Leg/foot are warm and dry, pulse present with doppler. Denies fevers. Hx a-fib and on Eliquis.        EXTERNAL RECORDS REVIEWED  Outpatient Notes Patient was last seen in office on 6/3/2024. She has a history of paroxysmal atrial fibrillation, hyperlipidemia, and cardiomegaly.    HPI/ROS  LIMITATION TO HISTORY   Select: : None    OUTSIDE HISTORIAN(S):  None noted    Lorene Haro is a 48 y.o. female who presents to the ED for left leg swelling and numbness onset 2 days ago. The patient reports that she has associated redness and pain in the back of her left knee and calf that radiates down her left leg. She denies any injuries, shortness of breath, chest pain, fever. She adds that she has atrial fibrillation and is on Eliquis. Patient denies any history of DVT. She reports an allergy to both amoxicillin and penicillin.      PAST MEDICAL HISTORY  Past Medical History:   Diagnosis Date    A-fib (HCC)     MONI (acute kidney injury) (HCC) 8/9/2023    Asthma     Bipolar 2 disorder (HCC)     Chickenpox     Chronic obstructive pulmonary disease (HCC)     Hypertension     On home oxygen therapy     Other psychological stress     Schizophrenic disorder (HCC)     Yeast infection of the skin 04/01/2021       SURGICAL HISTORY  Past Surgical History:   Procedure Laterality Date    CHOLECYSTECTOMY      COLECTOMY      HYSTERECTOMY LAPAROSCOPY      KNEE ARTHROSCOPY Left        FAMILY HISTORY  Family History   Problem Relation Age of Onset    No Known Problems Mother     Cancer Sister        SOCIAL HISTORY   reports that she has been  "smoking cigarettes. She has never used smokeless tobacco. She reports that she does not currently use alcohol. She reports that she does not use drugs.    CURRENT MEDICATIONS  Previous Medications    ADVAIR DISKUS 100-50 MCG/ACT AEROSOL POWDER, BREATH ACTIVATED    Inhale 1 Puff 2 times a day.    ALBUTEROL 108 (90 BASE) MCG/ACT AERO SOLN INHALATION AEROSOL    Inhale 2 Puffs every 6 hours as needed for Shortness of Breath.    APIXABAN (ELIQUIS) 5MG TAB    Take 1 Tablet by mouth 2 times a day.    ARIPIPRAZOLE (ABILIFY) 30 MG TABLET    Take 1 Tablet by mouth every morning. Indications: Major Depressive Disorder    ATORVASTATIN (LIPITOR) 20 MG TAB    Take 1 Tablet by mouth at bedtime.    BENZONATATE (TESSALON) 100 MG CAP    Take 1 Capsule by mouth 3 times a day as needed for Cough.    FLUTICASONE (FLONASE) 50 MCG/ACT NASAL SPRAY    Administer 2 Sprays into each nostril every morning.    GUAIFENESIN (ROBITUSSIN) 100 MG/5ML LIQUID    Take 10 mL by mouth every four hours as needed for Cough.    HOME CARE OXYGEN    Inhale 2 L/min at bedtime. DME [facility] (now [facility])    METOPROLOL SR (TOPROL XL) 25 MG TABLET SR 24 HR    Take 25 mg by mouth every day.    MONTELUKAST (SINGULAIR) 10 MG TAB    Take 1 Tablet by mouth every evening.       ALLERGIES  Penicillin g and Amoxicillin    PHYSICAL EXAM  /68   Pulse 70   Temp 36.8 °C (98.3 °F) (Oral)   Resp 15   Ht 1.549 m (5' 1\")   Wt (!) 127 kg (280 lb)   LMP  (LMP Unknown)   SpO2 95%   BMI 52.91 kg/m²   Constitutional: Patient is  Morbidly obese, flat affect, mild distress.  HENT: Normocephalic, nares patent and clear oral mucosa is moist.  Cardiovascular: Normal heart rate and Regular rhythm. No murmur,   Thorax & Lungs: Clear and equal breath sounds with good excursion. No respiratory distress, no rhonchi, wheezing or rales.   Abdomen: Bowel sounds normal in all four quadrants. Soft, obese, nontender, no rebound , guarding, palpable masses.   Skin: Warm, Dry, No " erythema, No rashes.    Extremities: Peripheral pulses 4/4. Swelling to bilateral lower extremities with tenderness to palpation from the left knee to the left ankle, venous stasis dermatitis changes to bilateral lower extremities, tenderness in popliteal region.  Neurologic: Alert & oriented x 3, Normal motor function, Normal sensory function,   Psychiatric: , Flat affect, Judgment normal, Mood normal.     DIAGNOSTIC STUDIES & PROCEDURES    Labs:   Results for orders placed or performed during the hospital encounter of 06/27/24   CBC WITH DIFFERENTIAL   Result Value Ref Range    WBC 11.0 (H) 4.8 - 10.8 K/uL    RBC 4.22 4.20 - 5.40 M/uL    Hemoglobin 11.8 (L) 12.0 - 16.0 g/dL    Hematocrit 37.7 37.0 - 47.0 %    MCV 89.3 81.4 - 97.8 fL    MCH 28.0 27.0 - 33.0 pg    MCHC 31.3 (L) 32.2 - 35.5 g/dL    RDW 53.7 (H) 35.9 - 50.0 fL    Platelet Count 213 164 - 446 K/uL    MPV 11.5 9.0 - 12.9 fL    Neutrophils-Polys 71.20 44.00 - 72.00 %    Lymphocytes 19.80 (L) 22.00 - 41.00 %    Monocytes 7.80 0.00 - 13.40 %    Eosinophils 0.60 0.00 - 6.90 %    Basophils 0.20 0.00 - 1.80 %    Immature Granulocytes 0.40 0.00 - 0.90 %    Nucleated RBC 0.00 0.00 - 0.20 /100 WBC    Neutrophils (Absolute) 7.81 (H) 1.82 - 7.42 K/uL    Lymphs (Absolute) 2.17 1.00 - 4.80 K/uL    Monos (Absolute) 0.86 (H) 0.00 - 0.85 K/uL    Eos (Absolute) 0.07 0.00 - 0.51 K/uL    Baso (Absolute) 0.02 0.00 - 0.12 K/uL    Immature Granulocytes (abs) 0.04 0.00 - 0.11 K/uL    NRBC (Absolute) 0.00 K/uL   COMP METABOLIC PANEL   Result Value Ref Range    Sodium 143 135 - 145 mmol/L    Potassium 4.1 3.6 - 5.5 mmol/L    Chloride 108 96 - 112 mmol/L    Co2 25 20 - 33 mmol/L    Anion Gap 10.0 7.0 - 16.0    Glucose 119 (H) 65 - 99 mg/dL    Bun 19 8 - 22 mg/dL    Creatinine 1.00 0.50 - 1.40 mg/dL    Calcium 9.7 8.5 - 10.5 mg/dL    Correct Calcium 9.6 8.5 - 10.5 mg/dL    AST(SGOT) 7 (L) 12 - 45 U/L    ALT(SGPT) 7 2 - 50 U/L    Alkaline Phosphatase 131 (H) 30 - 99 U/L    Total  Bilirubin 0.8 0.1 - 1.5 mg/dL    Albumin 4.1 3.2 - 4.9 g/dL    Total Protein 6.9 6.0 - 8.2 g/dL    Globulin 2.8 1.9 - 3.5 g/dL    A-G Ratio 1.5 g/dL   APTT   Result Value Ref Range    APTT 33.3 24.7 - 36.0 sec   PROTHROMBIN TIME (INR)   Result Value Ref Range    PT 16.7 (H) 12.0 - 14.6 sec    INR 1.34 (H) 0.87 - 1.13   ESTIMATED GFR   Result Value Ref Range    GFR (CKD-EPI) 68 >60 mL/min/1.73 m 2     *Note: Due to a large number of results and/or encounters for the requested time period, some results have not been displayed. A complete set of results can be found in Results Review.       All labs reviewed by me.    Radiology:   The attending Emergency Physician has independently interpreted the diagnostic imaging associated with this visit and is awaiting the final reading from the radiologist, which will be displayed below.    Preliminary interpretation is a follows: No DVT  Radiologist interpretation:    US-EXTREMITY VENOUS LOWER UNILAT LEFT   Final Result      No DVT noted.    COURSE & MEDICAL DECISION MAKING      INITIAL ASSESSMENT AND PLAN  Care Narrative:       10:36 PM - Patient seen and evaluated at bedside. Discussed plan of care, including getting labs and imaging to monitor her symptoms. Patient agrees to plan of care. Patient will be treated with Tylenol 1,000 mg PO for her symptoms. Ordered CBC with Diff, CMP, APTT,  Prothrombin time, ans US-extremity venous lower uni lat left to evaluate. Differential diagnoses include but are not limited to: DVT, cellulitis.     12:09 AM - The patient will be treated with OxyContin 10 mg PO for pain because she is on Eliquis and we cannot give NSAIDs.  She did not respond to the Tylenol.  Laboratories were unremarkable other than a slightly elevated white blood cell count of 11.  She has no significant left shift.  PT and INR is 16.7 and 1.34 respectively.  I will treat her for cellulitis and sent prescriptions for home.  She is to elevate her leg, warm compresses and  follow-up with her primary care doctor in 1 week for recheck.    1:10 AM - Patient was reevaluated at bedside. Discussed lab and radiology results with the patient and informed them there is no DVT.  The patient will be treated with Ancef 2 g injection for her symptoms.    1:43 AM - I reevaluated the patient at bedside. The patient informs me they feel improved following Tylenol, OxyContin, and Ancef administration. I discussed plan for discharge and follow up as outlined below. The patient is stable for discharge at this time and will return for any new or worsening symptoms. Patient verbalizes understanding and support with my plan for discharge.      ADDITIONAL PROBLEM LIST AND DISPOSITION  Psych               DISPOSITION AND DISCUSSIONS  I have discussed management of the patient with the following physicians and JAY's: None    Discussion of management with other Q[facility] or appropriate source(s): None     Barriers to care at this time, including but not limited to: Patient does not have established PCP.     Decision tools and prescription drugs considered including, but not limited to: Antibiotics Keflex and tramadol .    I reviewed prescription monitoring program for patient's narcotic use before prescribing a scheduled drug.The patient will not drink alcohol nor drive with prescribed medications. The patient will return for new or worsening symptoms and is stable at the time of discharge.    In prescribing controlled substances to this patient, I certify that I have obtained and reviewed the medical history of Lorene Haro. I have also made a good hansa effort to obtain applicable records from other providers who have treated the patient and records did not demonstrate any increased risk of substance abuse that would prevent me from prescribing controlled substances.     I have conducted a physical exam and documented it. I have reviewed Ms. Haro’s prescription history as maintained by the [place]  Prescription Monitoring Program.     I have assessed the patient’s risk for abuse, dependency, and addiction using the validated Opioid Risk Tool available at https://www.mdcalc.com/iwykxd-lttm-isjx-ort-narcotic-abuse.     Given the above, I believe the benefits of controlled substance therapy outweigh the risks. The reasons for prescribing controlled substances include non-narcotic, oral analgesic alternatives are contraindicated. Accordingly, I have discussed the risk and benefits, treatment plan, and alternative therapies with the patient.      DISPOSITION:  Patient will be discharged home in stable condition.    FOLLOW UP:  [address] [place] [address]  [phone]  Schedule an appointment as soon as possible for a visit in 1 week  For recheck      OUTPATIENT MEDICATIONS:  Discharge Medication List as of 6/28/2024  1:59 AM        START taking these medications    Details   cephALEXin (KEFLEX) 500 MG Cap Take 1 Capsule by mouth 4 times a day., Disp-40 Capsule, R-0, Normal      traMADol (ULTRAM) 50 MG Tab Take 1 Tablet by mouth every 6 hours as needed for Moderate Pain (pain) for up to 2 days. Take with food and stool softeners, Disp-8 Tablet, R-0, Normal              FINAL IMPRESSION   1. Cellulitis of left lower leg    2. Paresthesia    3. Pain in left lower leg         Arlyn HARRIS (Ade), am scribing for, and in the presence of, [place]ia Mora D.O..    Electronically signed by: Arlyn Bang ([place]), 6/27/2024    IAl[place]ia D.O. personally performed the services described in this documentation, as scribed by Arlyn Bang in my presence, and it is both accurate and complete.    The note accurately reflects work and decisions made by me.  [place] Mora D.O.  6/28/2024  5:46 AM

## 2024-06-28 NOTE — ED NOTES
MTM arranged for patient; wheeled patient out of the ER to her cab; ambulated per walker; Aox4 GCS 14; medication with patient;all belongings with patient

## 2024-06-30 ENCOUNTER — HOSPITAL ENCOUNTER (EMERGENCY)
Facility: MEDICAL CENTER | Age: 49
End: 2024-06-30
Attending: EMERGENCY MEDICINE
Payer: MEDICAID

## 2024-06-30 ENCOUNTER — APPOINTMENT (OUTPATIENT)
Dept: RADIOLOGY | Facility: MEDICAL CENTER | Age: 49
End: 2024-06-30
Attending: EMERGENCY MEDICINE
Payer: MEDICAID

## 2024-06-30 VITALS
HEART RATE: 66 BPM | SYSTOLIC BLOOD PRESSURE: 130 MMHG | OXYGEN SATURATION: 93 % | RESPIRATION RATE: 18 BRPM | HEIGHT: 67 IN | WEIGHT: 278 LBS | BODY MASS INDEX: 43.63 KG/M2 | TEMPERATURE: 97.8 F | DIASTOLIC BLOOD PRESSURE: 58 MMHG

## 2024-06-30 DIAGNOSIS — M79.605 PAIN OF LEFT LOWER EXTREMITY: ICD-10-CM

## 2024-06-30 DIAGNOSIS — L03.116 CELLULITIS OF LEFT LOWER EXTREMITY: ICD-10-CM

## 2024-06-30 LAB
ALBUMIN SERPL BCP-MCNC: 4.2 G/DL (ref 3.2–4.9)
ALBUMIN/GLOB SERPL: 1.4 G/DL
ALP SERPL-CCNC: 158 U/L (ref 30–99)
ALT SERPL-CCNC: 13 U/L (ref 2–50)
ANION GAP SERPL CALC-SCNC: 9 MMOL/L (ref 7–16)
AST SERPL-CCNC: 12 U/L (ref 12–45)
BASOPHILS # BLD AUTO: 0.2 % (ref 0–1.8)
BASOPHILS # BLD: 0.02 K/UL (ref 0–0.12)
BILIRUB SERPL-MCNC: 0.8 MG/DL (ref 0.1–1.5)
BUN SERPL-MCNC: 13 MG/DL (ref 8–22)
CALCIUM ALBUM COR SERPL-MCNC: 9.5 MG/DL (ref 8.5–10.5)
CALCIUM SERPL-MCNC: 9.7 MG/DL (ref 8.5–10.5)
CHLORIDE SERPL-SCNC: 109 MMOL/L (ref 96–112)
CO2 SERPL-SCNC: 27 MMOL/L (ref 20–33)
CREAT SERPL-MCNC: 0.78 MG/DL (ref 0.5–1.4)
EKG IMPRESSION: NORMAL
EOSINOPHIL # BLD AUTO: 0.09 K/UL (ref 0–0.51)
EOSINOPHIL NFR BLD: 1.1 % (ref 0–6.9)
ERYTHROCYTE [DISTWIDTH] IN BLOOD BY AUTOMATED COUNT: 54.1 FL (ref 35.9–50)
GFR SERPLBLD CREATININE-BSD FMLA CKD-EPI: 92 ML/MIN/1.73 M 2
GLOBULIN SER CALC-MCNC: 3 G/DL (ref 1.9–3.5)
GLUCOSE SERPL-MCNC: 122 MG/DL (ref 65–99)
HCT VFR BLD AUTO: 40.5 % (ref 37–47)
HGB BLD-MCNC: 12.6 G/DL (ref 12–16)
IMM GRANULOCYTES # BLD AUTO: 0.05 K/UL (ref 0–0.11)
IMM GRANULOCYTES NFR BLD AUTO: 0.6 % (ref 0–0.9)
LYMPHOCYTES # BLD AUTO: 1.54 K/UL (ref 1–4.8)
LYMPHOCYTES NFR BLD: 18.9 % (ref 22–41)
MAGNESIUM SERPL-MCNC: 2.3 MG/DL (ref 1.5–2.5)
MCH RBC QN AUTO: 28.1 PG (ref 27–33)
MCHC RBC AUTO-ENTMCNC: 31.1 G/DL (ref 32.2–35.5)
MCV RBC AUTO: 90.2 FL (ref 81.4–97.8)
MONOCYTES # BLD AUTO: 0.45 K/UL (ref 0–0.85)
MONOCYTES NFR BLD AUTO: 5.5 % (ref 0–13.4)
NEUTROPHILS # BLD AUTO: 5.98 K/UL (ref 1.82–7.42)
NEUTROPHILS NFR BLD: 73.7 % (ref 44–72)
NRBC # BLD AUTO: 0 K/UL
NRBC BLD-RTO: 0 /100 WBC (ref 0–0.2)
NT-PROBNP SERPL IA-MCNC: 403 PG/ML (ref 0–125)
PLATELET # BLD AUTO: 212 K/UL (ref 164–446)
PMV BLD AUTO: 11.7 FL (ref 9–12.9)
POTASSIUM SERPL-SCNC: 4.6 MMOL/L (ref 3.6–5.5)
PROT SERPL-MCNC: 7.2 G/DL (ref 6–8.2)
RBC # BLD AUTO: 4.49 M/UL (ref 4.2–5.4)
SODIUM SERPL-SCNC: 145 MMOL/L (ref 135–145)
TROPONIN T SERPL-MCNC: 27 NG/L (ref 6–19)
WBC # BLD AUTO: 8.1 K/UL (ref 4.8–10.8)

## 2024-06-30 PROCEDURE — 99284 EMERGENCY DEPT VISIT MOD MDM: CPT

## 2024-06-30 PROCEDURE — 83735 ASSAY OF MAGNESIUM: CPT

## 2024-06-30 PROCEDURE — 71045 X-RAY EXAM CHEST 1 VIEW: CPT

## 2024-06-30 PROCEDURE — A9270 NON-COVERED ITEM OR SERVICE: HCPCS | Mod: UD | Performed by: EMERGENCY MEDICINE

## 2024-06-30 PROCEDURE — 84484 ASSAY OF TROPONIN QUANT: CPT

## 2024-06-30 PROCEDURE — 83880 ASSAY OF NATRIURETIC PEPTIDE: CPT

## 2024-06-30 PROCEDURE — 85025 COMPLETE CBC W/AUTO DIFF WBC: CPT

## 2024-06-30 PROCEDURE — 80053 COMPREHEN METABOLIC PANEL: CPT

## 2024-06-30 PROCEDURE — 93005 ELECTROCARDIOGRAM TRACING: CPT | Performed by: EMERGENCY MEDICINE

## 2024-06-30 PROCEDURE — 36415 COLL VENOUS BLD VENIPUNCTURE: CPT

## 2024-06-30 PROCEDURE — 700102 HCHG RX REV CODE 250 W/ 637 OVERRIDE(OP): Mod: UD | Performed by: EMERGENCY MEDICINE

## 2024-06-30 RX ORDER — ACETAMINOPHEN 325 MG/1
975 TABLET ORAL ONCE
Status: COMPLETED | OUTPATIENT
Start: 2024-06-30 | End: 2024-06-30

## 2024-06-30 RX ADMIN — ACETAMINOPHEN 975 MG: 325 TABLET, FILM COATED ORAL at 10:25

## 2024-06-30 ASSESSMENT — PAIN DESCRIPTION - PAIN TYPE
TYPE: ACUTE PAIN
TYPE: ACUTE PAIN

## 2024-06-30 ASSESSMENT — FIBROSIS 4 INDEX: FIB4 SCORE: 0.62

## 2024-06-30 ASSESSMENT — LIFESTYLE VARIABLES: DO YOU DRINK ALCOHOL: NO

## 2024-07-02 ENCOUNTER — NON-PROVIDER VISIT (OUTPATIENT)
Dept: CARDIOLOGY | Facility: MEDICAL CENTER | Age: 49
End: 2024-07-02
Payer: MEDICAID

## 2024-07-02 PROCEDURE — 93298 REM INTERROG DEV EVAL SCRMS: CPT | Mod: 26 | Performed by: INTERNAL MEDICINE

## 2024-07-06 ENCOUNTER — APPOINTMENT (OUTPATIENT)
Dept: RADIOLOGY | Facility: MEDICAL CENTER | Age: 49
End: 2024-07-06
Attending: EMERGENCY MEDICINE
Payer: MEDICAID

## 2024-07-06 ENCOUNTER — HOSPITAL ENCOUNTER (EMERGENCY)
Facility: MEDICAL CENTER | Age: 49
End: 2024-07-06
Attending: EMERGENCY MEDICINE
Payer: MEDICAID

## 2024-07-06 VITALS
SYSTOLIC BLOOD PRESSURE: 133 MMHG | OXYGEN SATURATION: 91 % | HEIGHT: 61 IN | BODY MASS INDEX: 54.73 KG/M2 | HEART RATE: 72 BPM | RESPIRATION RATE: 20 BRPM | DIASTOLIC BLOOD PRESSURE: 58 MMHG | TEMPERATURE: 97.3 F | WEIGHT: 289.9 LBS

## 2024-07-06 DIAGNOSIS — M54.2 NECK PAIN: ICD-10-CM

## 2024-07-06 DIAGNOSIS — R22.1 NECK MASS: ICD-10-CM

## 2024-07-06 LAB
EKG IMPRESSION: NORMAL
GLUCOSE BLD STRIP.AUTO-MCNC: 162 MG/DL (ref 65–99)

## 2024-07-06 PROCEDURE — 99284 EMERGENCY DEPT VISIT MOD MDM: CPT

## 2024-07-06 PROCEDURE — 72125 CT NECK SPINE W/O DYE: CPT

## 2024-07-06 PROCEDURE — 93005 ELECTROCARDIOGRAM TRACING: CPT

## 2024-07-06 PROCEDURE — 93005 ELECTROCARDIOGRAM TRACING: CPT | Performed by: EMERGENCY MEDICINE

## 2024-07-06 PROCEDURE — 82962 GLUCOSE BLOOD TEST: CPT

## 2024-07-06 ASSESSMENT — PAIN DESCRIPTION - PAIN TYPE
TYPE: ACUTE PAIN
TYPE: ACUTE PAIN

## 2024-07-06 ASSESSMENT — PAIN DESCRIPTION - DESCRIPTORS: DESCRIPTORS: ACHING

## 2024-07-06 ASSESSMENT — FIBROSIS 4 INDEX: FIB4 SCORE: 0.78

## 2024-07-08 ENCOUNTER — HOSPITAL ENCOUNTER (EMERGENCY)
Facility: MEDICAL CENTER | Age: 49
End: 2024-07-08
Attending: EMERGENCY MEDICINE
Payer: MEDICAID

## 2024-07-08 VITALS
RESPIRATION RATE: 15 BRPM | HEART RATE: 64 BPM | OXYGEN SATURATION: 94 % | SYSTOLIC BLOOD PRESSURE: 156 MMHG | TEMPERATURE: 98.2 F | DIASTOLIC BLOOD PRESSURE: 67 MMHG

## 2024-07-08 DIAGNOSIS — M25.572 CHRONIC PAIN OF LEFT ANKLE: ICD-10-CM

## 2024-07-08 DIAGNOSIS — G89.29 CHRONIC PAIN OF LEFT ANKLE: ICD-10-CM

## 2024-07-08 DIAGNOSIS — G89.29 OTHER CHRONIC BACK PAIN: ICD-10-CM

## 2024-07-08 DIAGNOSIS — M54.9 OTHER CHRONIC BACK PAIN: ICD-10-CM

## 2024-07-08 PROCEDURE — 99284 EMERGENCY DEPT VISIT MOD MDM: CPT

## 2024-07-08 PROCEDURE — 700111 HCHG RX REV CODE 636 W/ 250 OVERRIDE (IP): Mod: UD | Performed by: EMERGENCY MEDICINE

## 2024-07-08 PROCEDURE — 96372 THER/PROPH/DIAG INJ SC/IM: CPT

## 2024-07-08 RX ORDER — HYDROMORPHONE HYDROCHLORIDE 1 MG/ML
1 INJECTION, SOLUTION INTRAMUSCULAR; INTRAVENOUS; SUBCUTANEOUS ONCE
Status: COMPLETED | OUTPATIENT
Start: 2024-07-08 | End: 2024-07-08

## 2024-07-08 RX ADMIN — HYDROMORPHONE HYDROCHLORIDE 1 MG: 1 INJECTION, SOLUTION INTRAMUSCULAR; INTRAVENOUS; SUBCUTANEOUS at 04:26

## 2024-07-08 ASSESSMENT — PAIN DESCRIPTION - PAIN TYPE
TYPE: ACUTE PAIN
TYPE: ACUTE PAIN

## 2024-07-11 ENCOUNTER — HOSPITAL ENCOUNTER (EMERGENCY)
Facility: MEDICAL CENTER | Age: 49
End: 2024-07-12
Attending: EMERGENCY MEDICINE
Payer: MEDICAID

## 2024-07-11 DIAGNOSIS — J45.41 MODERATE PERSISTENT ASTHMA WITH ACUTE EXACERBATION: ICD-10-CM

## 2024-07-11 DIAGNOSIS — L03.116 CELLULITIS OF BOTH LOWER EXTREMITIES: ICD-10-CM

## 2024-07-11 DIAGNOSIS — R42 LIGHT HEADEDNESS: ICD-10-CM

## 2024-07-11 DIAGNOSIS — L03.115 CELLULITIS OF BOTH LOWER EXTREMITIES: ICD-10-CM

## 2024-07-11 PROCEDURE — 85025 COMPLETE CBC W/AUTO DIFF WBC: CPT

## 2024-07-11 PROCEDURE — 80048 BASIC METABOLIC PNL TOTAL CA: CPT

## 2024-07-11 PROCEDURE — 36415 COLL VENOUS BLD VENIPUNCTURE: CPT

## 2024-07-11 PROCEDURE — 84484 ASSAY OF TROPONIN QUANT: CPT

## 2024-07-11 PROCEDURE — 99285 EMERGENCY DEPT VISIT HI MDM: CPT

## 2024-07-11 PROCEDURE — 96374 THER/PROPH/DIAG INJ IV PUSH: CPT

## 2024-07-11 PROCEDURE — 93005 ELECTROCARDIOGRAM TRACING: CPT | Performed by: EMERGENCY MEDICINE

## 2024-07-11 ASSESSMENT — FIBROSIS 4 INDEX: FIB4 SCORE: 0.78

## 2024-07-11 ASSESSMENT — PAIN DESCRIPTION - PAIN TYPE: TYPE: ACUTE PAIN

## 2024-07-12 ENCOUNTER — APPOINTMENT (OUTPATIENT)
Dept: RADIOLOGY | Facility: MEDICAL CENTER | Age: 49
End: 2024-07-12
Attending: EMERGENCY MEDICINE
Payer: MEDICAID

## 2024-07-12 VITALS
TEMPERATURE: 97.6 F | BODY MASS INDEX: 54.56 KG/M2 | HEART RATE: 61 BPM | HEIGHT: 61 IN | OXYGEN SATURATION: 90 % | WEIGHT: 289 LBS | DIASTOLIC BLOOD PRESSURE: 72 MMHG | SYSTOLIC BLOOD PRESSURE: 137 MMHG | RESPIRATION RATE: 18 BRPM

## 2024-07-12 LAB
ANION GAP SERPL CALC-SCNC: 12 MMOL/L (ref 7–16)
BASOPHILS # BLD AUTO: 0.3 % (ref 0–1.8)
BASOPHILS # BLD: 0.03 K/UL (ref 0–0.12)
BUN SERPL-MCNC: 11 MG/DL (ref 8–22)
CALCIUM SERPL-MCNC: 9.9 MG/DL (ref 8.5–10.5)
CHLORIDE SERPL-SCNC: 107 MMOL/L (ref 96–112)
CO2 SERPL-SCNC: 25 MMOL/L (ref 20–33)
CREAT SERPL-MCNC: 0.9 MG/DL (ref 0.5–1.4)
EKG IMPRESSION: NORMAL
EOSINOPHIL # BLD AUTO: 0.19 K/UL (ref 0–0.51)
EOSINOPHIL NFR BLD: 1.6 % (ref 0–6.9)
ERYTHROCYTE [DISTWIDTH] IN BLOOD BY AUTOMATED COUNT: 52.9 FL (ref 35.9–50)
GFR SERPLBLD CREATININE-BSD FMLA CKD-EPI: 77 ML/MIN/1.73 M 2
GLUCOSE SERPL-MCNC: 122 MG/DL (ref 65–99)
HCT VFR BLD AUTO: 38.7 % (ref 37–47)
HGB BLD-MCNC: 11.9 G/DL (ref 12–16)
IMM GRANULOCYTES # BLD AUTO: 0.05 K/UL (ref 0–0.11)
IMM GRANULOCYTES NFR BLD AUTO: 0.4 % (ref 0–0.9)
LYMPHOCYTES # BLD AUTO: 2.38 K/UL (ref 1–4.8)
LYMPHOCYTES NFR BLD: 20.2 % (ref 22–41)
MCH RBC QN AUTO: 27.3 PG (ref 27–33)
MCHC RBC AUTO-ENTMCNC: 30.7 G/DL (ref 32.2–35.5)
MCV RBC AUTO: 88.8 FL (ref 81.4–97.8)
MONOCYTES # BLD AUTO: 0.74 K/UL (ref 0–0.85)
MONOCYTES NFR BLD AUTO: 6.3 % (ref 0–13.4)
NEUTROPHILS # BLD AUTO: 8.41 K/UL (ref 1.82–7.42)
NEUTROPHILS NFR BLD: 71.2 % (ref 44–72)
NRBC # BLD AUTO: 0 K/UL
NRBC BLD-RTO: 0 /100 WBC (ref 0–0.2)
PLATELET # BLD AUTO: 256 K/UL (ref 164–446)
PMV BLD AUTO: 12 FL (ref 9–12.9)
POTASSIUM SERPL-SCNC: 4.1 MMOL/L (ref 3.6–5.5)
RBC # BLD AUTO: 4.36 M/UL (ref 4.2–5.4)
SODIUM SERPL-SCNC: 144 MMOL/L (ref 135–145)
TROPONIN T SERPL-MCNC: 27 NG/L (ref 6–19)
WBC # BLD AUTO: 11.8 K/UL (ref 4.8–10.8)

## 2024-07-12 PROCEDURE — 94640 AIRWAY INHALATION TREATMENT: CPT

## 2024-07-12 PROCEDURE — 700101 HCHG RX REV CODE 250: Mod: UD | Performed by: EMERGENCY MEDICINE

## 2024-07-12 PROCEDURE — 96374 THER/PROPH/DIAG INJ IV PUSH: CPT

## 2024-07-12 PROCEDURE — 71045 X-RAY EXAM CHEST 1 VIEW: CPT

## 2024-07-12 PROCEDURE — 700102 HCHG RX REV CODE 250 W/ 637 OVERRIDE(OP): Mod: UD | Performed by: EMERGENCY MEDICINE

## 2024-07-12 PROCEDURE — A9270 NON-COVERED ITEM OR SERVICE: HCPCS | Mod: UD | Performed by: EMERGENCY MEDICINE

## 2024-07-12 PROCEDURE — 700111 HCHG RX REV CODE 636 W/ 250 OVERRIDE (IP): Mod: JZ,UD | Performed by: EMERGENCY MEDICINE

## 2024-07-12 PROCEDURE — 700105 HCHG RX REV CODE 258: Mod: UD | Performed by: EMERGENCY MEDICINE

## 2024-07-12 RX ORDER — IPRATROPIUM BROMIDE AND ALBUTEROL SULFATE 2.5; .5 MG/3ML; MG/3ML
3 SOLUTION RESPIRATORY (INHALATION)
Status: COMPLETED | OUTPATIENT
Start: 2024-07-12 | End: 2024-07-12

## 2024-07-12 RX ORDER — PREDNISONE 20 MG/1
40 TABLET ORAL DAILY
Qty: 6 TABLET | Refills: 0 | Status: SHIPPED | OUTPATIENT
Start: 2024-07-12 | End: 2024-07-15

## 2024-07-12 RX ORDER — PREDNISONE 20 MG/1
60 TABLET ORAL ONCE
Status: COMPLETED | OUTPATIENT
Start: 2024-07-12 | End: 2024-07-12

## 2024-07-12 RX ORDER — CEPHALEXIN 500 MG/1
500 CAPSULE ORAL ONCE
Status: COMPLETED | OUTPATIENT
Start: 2024-07-12 | End: 2024-07-12

## 2024-07-12 RX ORDER — SODIUM CHLORIDE, SODIUM LACTATE, POTASSIUM CHLORIDE, CALCIUM CHLORIDE 600; 310; 30; 20 MG/100ML; MG/100ML; MG/100ML; MG/100ML
500 INJECTION, SOLUTION INTRAVENOUS ONCE
Status: COMPLETED | OUTPATIENT
Start: 2024-07-12 | End: 2024-07-12

## 2024-07-12 RX ORDER — ONDANSETRON 2 MG/ML
4 INJECTION INTRAMUSCULAR; INTRAVENOUS ONCE
Status: COMPLETED | OUTPATIENT
Start: 2024-07-12 | End: 2024-07-12

## 2024-07-12 RX ORDER — CEPHALEXIN 500 MG/1
500 CAPSULE ORAL 4 TIMES DAILY
Qty: 28 CAPSULE | Refills: 0 | Status: ACTIVE | OUTPATIENT
Start: 2024-07-12 | End: 2024-07-19

## 2024-07-12 RX ADMIN — PREDNISONE 60 MG: 20 TABLET ORAL at 00:38

## 2024-07-12 RX ADMIN — ONDANSETRON 4 MG: 2 INJECTION INTRAMUSCULAR; INTRAVENOUS at 01:17

## 2024-07-12 RX ADMIN — SODIUM CHLORIDE, POTASSIUM CHLORIDE, SODIUM LACTATE AND CALCIUM CHLORIDE 500 ML: 600; 310; 30; 20 INJECTION, SOLUTION INTRAVENOUS at 01:18

## 2024-07-12 RX ADMIN — CEPHALEXIN 500 MG: 500 CAPSULE ORAL at 02:15

## 2024-07-12 RX ADMIN — IPRATROPIUM BROMIDE AND ALBUTEROL SULFATE 3 ML: 2.5; .5 SOLUTION RESPIRATORY (INHALATION) at 00:28

## 2024-07-15 ENCOUNTER — HOSPITAL ENCOUNTER (EMERGENCY)
Facility: MEDICAL CENTER | Age: 49
End: 2024-07-15
Attending: EMERGENCY MEDICINE
Payer: MEDICAID

## 2024-07-15 ENCOUNTER — APPOINTMENT (OUTPATIENT)
Dept: RADIOLOGY | Facility: MEDICAL CENTER | Age: 49
End: 2024-07-15
Attending: EMERGENCY MEDICINE
Payer: MEDICAID

## 2024-07-15 VITALS
TEMPERATURE: 96.9 F | RESPIRATION RATE: 27 BRPM | SYSTOLIC BLOOD PRESSURE: 122 MMHG | WEIGHT: 288.8 LBS | HEART RATE: 53 BPM | DIASTOLIC BLOOD PRESSURE: 61 MMHG | BODY MASS INDEX: 54.53 KG/M2 | HEIGHT: 61 IN | OXYGEN SATURATION: 98 %

## 2024-07-15 DIAGNOSIS — J44.1 ACUTE EXACERBATION OF CHRONIC OBSTRUCTIVE PULMONARY DISEASE (COPD) (HCC): ICD-10-CM

## 2024-07-15 DIAGNOSIS — E66.01 OBESITIES, MORBID (HCC): ICD-10-CM

## 2024-07-15 LAB
ALBUMIN SERPL BCP-MCNC: 3.5 G/DL (ref 3.2–4.9)
ALBUMIN/GLOB SERPL: 1.2 G/DL
ALP SERPL-CCNC: 141 U/L (ref 30–99)
ALT SERPL-CCNC: 18 U/L (ref 2–50)
ANION GAP SERPL CALC-SCNC: 11 MMOL/L (ref 7–16)
ANISOCYTOSIS BLD QL SMEAR: ABNORMAL
AST SERPL-CCNC: 14 U/L (ref 12–45)
BASOPHILS # BLD AUTO: 0.1 % (ref 0–1.8)
BASOPHILS # BLD: 0.01 K/UL (ref 0–0.12)
BILIRUB SERPL-MCNC: 0.8 MG/DL (ref 0.1–1.5)
BUN SERPL-MCNC: 17 MG/DL (ref 8–22)
CALCIUM ALBUM COR SERPL-MCNC: 9.3 MG/DL (ref 8.5–10.5)
CALCIUM SERPL-MCNC: 8.9 MG/DL (ref 8.5–10.5)
CHLORIDE SERPL-SCNC: 108 MMOL/L (ref 96–112)
CO2 SERPL-SCNC: 23 MMOL/L (ref 20–33)
COMMENT 1642: NORMAL
CREAT SERPL-MCNC: 0.76 MG/DL (ref 0.5–1.4)
EKG IMPRESSION: NORMAL
EOSINOPHIL # BLD AUTO: 0.06 K/UL (ref 0–0.51)
EOSINOPHIL NFR BLD: 0.7 % (ref 0–6.9)
ERYTHROCYTE [DISTWIDTH] IN BLOOD BY AUTOMATED COUNT: 52.5 FL (ref 35.9–50)
FLUAV RNA SPEC QL NAA+PROBE: NEGATIVE
FLUBV RNA SPEC QL NAA+PROBE: NEGATIVE
GFR SERPLBLD CREATININE-BSD FMLA CKD-EPI: 95 ML/MIN/1.73 M 2
GLOBULIN SER CALC-MCNC: 2.9 G/DL (ref 1.9–3.5)
GLUCOSE SERPL-MCNC: 112 MG/DL (ref 65–99)
HCT VFR BLD AUTO: 38.8 % (ref 37–47)
HGB BLD-MCNC: 11.5 G/DL (ref 12–16)
IMM GRANULOCYTES # BLD AUTO: 0.03 K/UL (ref 0–0.11)
IMM GRANULOCYTES NFR BLD AUTO: 0.3 % (ref 0–0.9)
LYMPHOCYTES # BLD AUTO: 1.67 K/UL (ref 1–4.8)
LYMPHOCYTES NFR BLD: 18.5 % (ref 22–41)
MCH RBC QN AUTO: 26.7 PG (ref 27–33)
MCHC RBC AUTO-ENTMCNC: 29.6 G/DL (ref 32.2–35.5)
MCV RBC AUTO: 90 FL (ref 81.4–97.8)
MICROCYTES BLD QL SMEAR: ABNORMAL
MONOCYTES # BLD AUTO: 0.54 K/UL (ref 0–0.85)
MONOCYTES NFR BLD AUTO: 6 % (ref 0–13.4)
MORPHOLOGY BLD-IMP: NORMAL
NEUTROPHILS # BLD AUTO: 6.72 K/UL (ref 1.82–7.42)
NEUTROPHILS NFR BLD: 74.4 % (ref 44–72)
NRBC # BLD AUTO: 0 K/UL
NRBC BLD-RTO: 0 /100 WBC (ref 0–0.2)
OVALOCYTES BLD QL SMEAR: NORMAL
PLATELET # BLD AUTO: 223 K/UL (ref 164–446)
PLATELET BLD QL SMEAR: NORMAL
PMV BLD AUTO: 11.6 FL (ref 9–12.9)
POIKILOCYTOSIS BLD QL SMEAR: NORMAL
POTASSIUM SERPL-SCNC: 4.6 MMOL/L (ref 3.6–5.5)
PROT SERPL-MCNC: 6.4 G/DL (ref 6–8.2)
RBC # BLD AUTO: 4.31 M/UL (ref 4.2–5.4)
RBC BLD AUTO: PRESENT
RSV RNA SPEC QL NAA+PROBE: NEGATIVE
SARS-COV-2 RNA RESP QL NAA+PROBE: NOTDETECTED
SODIUM SERPL-SCNC: 142 MMOL/L (ref 135–145)
TROPONIN T SERPL-MCNC: 26 NG/L (ref 6–19)
TROPONIN T SERPL-MCNC: 34 NG/L (ref 6–19)
WBC # BLD AUTO: 9 K/UL (ref 4.8–10.8)

## 2024-07-15 PROCEDURE — 93005 ELECTROCARDIOGRAM TRACING: CPT

## 2024-07-15 PROCEDURE — 0241U HCHG SARS-COV-2 COVID-19 NFCT DS RESP RNA 4 TRGT ED POC: CPT

## 2024-07-15 PROCEDURE — 36415 COLL VENOUS BLD VENIPUNCTURE: CPT

## 2024-07-15 PROCEDURE — 80053 COMPREHEN METABOLIC PANEL: CPT

## 2024-07-15 PROCEDURE — 84484 ASSAY OF TROPONIN QUANT: CPT

## 2024-07-15 PROCEDURE — 71045 X-RAY EXAM CHEST 1 VIEW: CPT

## 2024-07-15 PROCEDURE — 85025 COMPLETE CBC W/AUTO DIFF WBC: CPT

## 2024-07-15 PROCEDURE — 99284 EMERGENCY DEPT VISIT MOD MDM: CPT

## 2024-07-15 PROCEDURE — 96374 THER/PROPH/DIAG INJ IV PUSH: CPT

## 2024-07-15 PROCEDURE — 700111 HCHG RX REV CODE 636 W/ 250 OVERRIDE (IP): Mod: UD | Performed by: EMERGENCY MEDICINE

## 2024-07-15 PROCEDURE — 700101 HCHG RX REV CODE 250: Mod: UD | Performed by: EMERGENCY MEDICINE

## 2024-07-15 PROCEDURE — 93005 ELECTROCARDIOGRAM TRACING: CPT | Performed by: EMERGENCY MEDICINE

## 2024-07-15 PROCEDURE — 94640 AIRWAY INHALATION TREATMENT: CPT

## 2024-07-15 RX ORDER — DEXAMETHASONE SODIUM PHOSPHATE 4 MG/ML
10 INJECTION, SOLUTION INTRA-ARTICULAR; INTRALESIONAL; INTRAMUSCULAR; INTRAVENOUS; SOFT TISSUE ONCE
Status: COMPLETED | OUTPATIENT
Start: 2024-07-15 | End: 2024-07-15

## 2024-07-15 RX ORDER — PREDNISONE 20 MG/1
40 TABLET ORAL DAILY
Qty: 12 TABLET | Refills: 0 | Status: SHIPPED | OUTPATIENT
Start: 2024-07-15 | End: 2024-07-17

## 2024-07-15 RX ORDER — IPRATROPIUM BROMIDE AND ALBUTEROL SULFATE 2.5; .5 MG/3ML; MG/3ML
3 SOLUTION RESPIRATORY (INHALATION) ONCE
Status: COMPLETED | OUTPATIENT
Start: 2024-07-15 | End: 2024-07-15

## 2024-07-15 RX ADMIN — IPRATROPIUM BROMIDE AND ALBUTEROL SULFATE 3 ML: 2.5; .5 SOLUTION RESPIRATORY (INHALATION) at 11:59

## 2024-07-15 RX ADMIN — DEXAMETHASONE SODIUM PHOSPHATE 10 MG: 4 INJECTION INTRA-ARTICULAR; INTRALESIONAL; INTRAMUSCULAR; INTRAVENOUS; SOFT TISSUE at 11:27

## 2024-07-15 ASSESSMENT — FIBROSIS 4 INDEX: FIB4 SCORE: 0.65

## 2024-07-17 ENCOUNTER — ANTICOAGULATION VISIT (OUTPATIENT)
Dept: VASCULAR LAB | Facility: MEDICAL CENTER | Age: 49
End: 2024-07-17
Attending: INTERNAL MEDICINE
Payer: MEDICAID

## 2024-07-17 VITALS — HEART RATE: 76 BPM | DIASTOLIC BLOOD PRESSURE: 75 MMHG | SYSTOLIC BLOOD PRESSURE: 110 MMHG

## 2024-07-17 DIAGNOSIS — I48.0 PAROXYSMAL ATRIAL FIBRILLATION (HCC): ICD-10-CM

## 2024-07-17 PROCEDURE — 99211 OFF/OP EST MAY X REQ PHY/QHP: CPT

## 2024-07-25 ENCOUNTER — HOSPITAL ENCOUNTER (OUTPATIENT)
Dept: LAB | Facility: MEDICAL CENTER | Age: 49
End: 2024-07-25
Payer: MEDICAID

## 2024-07-25 DIAGNOSIS — I48.0 PAROXYSMAL ATRIAL FIBRILLATION (HCC): ICD-10-CM

## 2024-07-25 LAB
ALBUMIN SERPL BCP-MCNC: 4 G/DL (ref 3.2–4.9)
ALBUMIN/GLOB SERPL: 1.4 G/DL
ALP SERPL-CCNC: 122 U/L (ref 30–99)
ALT SERPL-CCNC: 14 U/L (ref 2–50)
ANION GAP SERPL CALC-SCNC: 9 MMOL/L (ref 7–16)
AST SERPL-CCNC: 10 U/L (ref 12–45)
BILIRUB SERPL-MCNC: 1.4 MG/DL (ref 0.1–1.5)
BUN SERPL-MCNC: 13 MG/DL (ref 8–22)
CALCIUM ALBUM COR SERPL-MCNC: 9.2 MG/DL (ref 8.5–10.5)
CALCIUM SERPL-MCNC: 9.2 MG/DL (ref 8.5–10.5)
CHLORIDE SERPL-SCNC: 107 MMOL/L (ref 96–112)
CO2 SERPL-SCNC: 27 MMOL/L (ref 20–33)
CREAT SERPL-MCNC: 0.82 MG/DL (ref 0.5–1.4)
ERYTHROCYTE [DISTWIDTH] IN BLOOD BY AUTOMATED COUNT: 53.4 FL (ref 35.9–50)
GFR SERPLBLD CREATININE-BSD FMLA CKD-EPI: 87 ML/MIN/1.73 M 2
GLOBULIN SER CALC-MCNC: 2.8 G/DL (ref 1.9–3.5)
GLUCOSE SERPL-MCNC: 93 MG/DL (ref 65–99)
HCT VFR BLD AUTO: 43.9 % (ref 37–47)
HGB BLD-MCNC: 13.2 G/DL (ref 12–16)
MCH RBC QN AUTO: 26.8 PG (ref 27–33)
MCHC RBC AUTO-ENTMCNC: 30.1 G/DL (ref 32.2–35.5)
MCV RBC AUTO: 89 FL (ref 81.4–97.8)
PLATELET # BLD AUTO: 251 K/UL (ref 164–446)
PMV BLD AUTO: 11.8 FL (ref 9–12.9)
POTASSIUM SERPL-SCNC: 4.7 MMOL/L (ref 3.6–5.5)
PROT SERPL-MCNC: 6.8 G/DL (ref 6–8.2)
RBC # BLD AUTO: 4.93 M/UL (ref 4.2–5.4)
SODIUM SERPL-SCNC: 143 MMOL/L (ref 135–145)
WBC # BLD AUTO: 11.3 K/UL (ref 4.8–10.8)

## 2024-07-25 PROCEDURE — 36415 COLL VENOUS BLD VENIPUNCTURE: CPT

## 2024-07-25 PROCEDURE — 80053 COMPREHEN METABOLIC PANEL: CPT

## 2024-07-25 PROCEDURE — 85027 COMPLETE CBC AUTOMATED: CPT

## 2024-07-26 ENCOUNTER — HOSPITAL ENCOUNTER (EMERGENCY)
Facility: MEDICAL CENTER | Age: 49
End: 2024-07-26
Attending: EMERGENCY MEDICINE
Payer: MEDICAID

## 2024-07-26 VITALS
HEIGHT: 61 IN | RESPIRATION RATE: 18 BRPM | DIASTOLIC BLOOD PRESSURE: 84 MMHG | BODY MASS INDEX: 52.49 KG/M2 | TEMPERATURE: 98.1 F | SYSTOLIC BLOOD PRESSURE: 127 MMHG | HEART RATE: 64 BPM | WEIGHT: 278 LBS | OXYGEN SATURATION: 93 %

## 2024-07-26 DIAGNOSIS — R42 DIZZINESS: ICD-10-CM

## 2024-07-26 DIAGNOSIS — Z71.6 TOBACCO ABUSE COUNSELING: ICD-10-CM

## 2024-07-26 DIAGNOSIS — H81.13 BENIGN PAROXYSMAL POSITIONAL VERTIGO DUE TO BILATERAL VESTIBULAR DISORDER: Primary | ICD-10-CM

## 2024-07-26 DIAGNOSIS — Z72.0 TOBACCO ABUSE: ICD-10-CM

## 2024-07-26 LAB — EKG IMPRESSION: NORMAL

## 2024-07-26 PROCEDURE — 99284 EMERGENCY DEPT VISIT MOD MDM: CPT

## 2024-07-26 PROCEDURE — 700111 HCHG RX REV CODE 636 W/ 250 OVERRIDE (IP): Mod: UD | Performed by: EMERGENCY MEDICINE

## 2024-07-26 PROCEDURE — 700102 HCHG RX REV CODE 250 W/ 637 OVERRIDE(OP): Mod: UD | Performed by: EMERGENCY MEDICINE

## 2024-07-26 PROCEDURE — 93005 ELECTROCARDIOGRAM TRACING: CPT | Performed by: EMERGENCY MEDICINE

## 2024-07-26 PROCEDURE — 99406 BEHAV CHNG SMOKING 3-10 MIN: CPT

## 2024-07-26 PROCEDURE — 93005 ELECTROCARDIOGRAM TRACING: CPT

## 2024-07-26 PROCEDURE — A9270 NON-COVERED ITEM OR SERVICE: HCPCS | Mod: UD | Performed by: EMERGENCY MEDICINE

## 2024-07-26 RX ORDER — MECLIZINE HYDROCHLORIDE 25 MG/1
25 TABLET ORAL 3 TIMES DAILY PRN
Qty: 30 TABLET | Refills: 0 | Status: SHIPPED | OUTPATIENT
Start: 2024-07-26

## 2024-07-26 RX ORDER — NICOTINE 21 MG/24HR
1 PATCH, TRANSDERMAL 24 HOURS TRANSDERMAL EVERY 24 HOURS
Qty: 30 PATCH | Refills: 0 | Status: ON HOLD | OUTPATIENT
Start: 2024-07-26 | End: 2024-07-31

## 2024-07-26 RX ORDER — MECLIZINE HYDROCHLORIDE 25 MG/1
25 TABLET ORAL ONCE
Status: COMPLETED | OUTPATIENT
Start: 2024-07-26 | End: 2024-07-26

## 2024-07-26 RX ORDER — DIAZEPAM 5 MG/1
5 TABLET ORAL ONCE
Status: COMPLETED | OUTPATIENT
Start: 2024-07-26 | End: 2024-07-26

## 2024-07-26 RX ORDER — ONDANSETRON 4 MG/1
4 TABLET, ORALLY DISINTEGRATING ORAL ONCE
Status: COMPLETED | OUTPATIENT
Start: 2024-07-26 | End: 2024-07-26

## 2024-07-26 RX ADMIN — MECLIZINE HYDROCHLORIDE 25 MG: 25 TABLET ORAL at 16:42

## 2024-07-26 RX ADMIN — DIAZEPAM 5 MG: 5 TABLET ORAL at 16:42

## 2024-07-26 RX ADMIN — ONDANSETRON 4 MG: 4 TABLET, ORALLY DISINTEGRATING ORAL at 16:42

## 2024-07-26 ASSESSMENT — FIBROSIS 4 INDEX: FIB4 SCORE: 0.53

## 2024-07-28 ENCOUNTER — HOSPITAL ENCOUNTER (EMERGENCY)
Facility: MEDICAL CENTER | Age: 49
End: 2024-07-28
Attending: EMERGENCY MEDICINE
Payer: MEDICAID

## 2024-07-28 ENCOUNTER — PHARMACY VISIT (OUTPATIENT)
Dept: PHARMACY | Facility: MEDICAL CENTER | Age: 49
End: 2024-07-28
Payer: COMMERCIAL

## 2024-07-28 ENCOUNTER — APPOINTMENT (OUTPATIENT)
Dept: RADIOLOGY | Facility: MEDICAL CENTER | Age: 49
End: 2024-07-28
Attending: EMERGENCY MEDICINE
Payer: MEDICAID

## 2024-07-28 VITALS
TEMPERATURE: 97.6 F | HEIGHT: 61 IN | WEIGHT: 278 LBS | HEART RATE: 62 BPM | BODY MASS INDEX: 52.49 KG/M2 | SYSTOLIC BLOOD PRESSURE: 122 MMHG | RESPIRATION RATE: 15 BRPM | OXYGEN SATURATION: 97 % | DIASTOLIC BLOOD PRESSURE: 62 MMHG

## 2024-07-28 DIAGNOSIS — R07.89 OTHER CHEST PAIN: ICD-10-CM

## 2024-07-28 DIAGNOSIS — J18.9 PNEUMONIA OF LEFT LOWER LOBE DUE TO INFECTIOUS ORGANISM: ICD-10-CM

## 2024-07-28 LAB
ALBUMIN SERPL BCP-MCNC: 3.7 G/DL (ref 3.2–4.9)
ALBUMIN/GLOB SERPL: 1.3 G/DL
ALP SERPL-CCNC: 116 U/L (ref 30–99)
ALT SERPL-CCNC: 11 U/L (ref 2–50)
ANION GAP SERPL CALC-SCNC: 8 MMOL/L (ref 7–16)
ANISOCYTOSIS BLD QL SMEAR: ABNORMAL
AST SERPL-CCNC: 10 U/L (ref 12–45)
BACTERIA BLD CULT: NORMAL
BASOPHILS # BLD AUTO: 0 % (ref 0–1.8)
BASOPHILS # BLD: 0 K/UL (ref 0–0.12)
BILIRUB SERPL-MCNC: 0.8 MG/DL (ref 0.1–1.5)
BUN SERPL-MCNC: 13 MG/DL (ref 8–22)
CALCIUM ALBUM COR SERPL-MCNC: 9.5 MG/DL (ref 8.5–10.5)
CALCIUM SERPL-MCNC: 9.3 MG/DL (ref 8.5–10.5)
CHLORIDE SERPL-SCNC: 109 MMOL/L (ref 96–112)
CO2 SERPL-SCNC: 26 MMOL/L (ref 20–33)
CREAT SERPL-MCNC: 0.8 MG/DL (ref 0.5–1.4)
DACRYOCYTES BLD QL SMEAR: NORMAL
EKG IMPRESSION: NORMAL
EOSINOPHIL # BLD AUTO: 0 K/UL (ref 0–0.51)
EOSINOPHIL NFR BLD: 0 % (ref 0–6.9)
ERYTHROCYTE [DISTWIDTH] IN BLOOD BY AUTOMATED COUNT: 52.9 FL (ref 35.9–50)
GFR SERPLBLD CREATININE-BSD FMLA CKD-EPI: 89 ML/MIN/1.73 M 2
GLOBULIN SER CALC-MCNC: 2.8 G/DL (ref 1.9–3.5)
GLUCOSE SERPL-MCNC: 134 MG/DL (ref 65–99)
HCT VFR BLD AUTO: 41.8 % (ref 37–47)
HGB BLD-MCNC: 12.5 G/DL (ref 12–16)
LYMPHOCYTES # BLD AUTO: 1.48 K/UL (ref 1–4.8)
LYMPHOCYTES NFR BLD: 13 % (ref 22–41)
MANUAL DIFF BLD: NORMAL
MCH RBC QN AUTO: 26.8 PG (ref 27–33)
MCHC RBC AUTO-ENTMCNC: 29.9 G/DL (ref 32.2–35.5)
MCV RBC AUTO: 89.7 FL (ref 81.4–97.8)
MICROCYTES BLD QL SMEAR: ABNORMAL
MONOCYTES # BLD AUTO: 0.3 K/UL (ref 0–0.85)
MONOCYTES NFR BLD AUTO: 2.6 % (ref 0–13.4)
MORPHOLOGY BLD-IMP: NORMAL
NEUTROPHILS # BLD AUTO: 9.62 K/UL (ref 1.82–7.42)
NEUTROPHILS NFR BLD: 84.4 % (ref 44–72)
NRBC # BLD AUTO: 0 K/UL
NRBC BLD-RTO: 0 /100 WBC (ref 0–0.2)
OVALOCYTES BLD QL SMEAR: NORMAL
PLATELET # BLD AUTO: 214 K/UL (ref 164–446)
PLATELET BLD QL SMEAR: NORMAL
PMV BLD AUTO: 10.9 FL (ref 9–12.9)
POIKILOCYTOSIS BLD QL SMEAR: NORMAL
POTASSIUM SERPL-SCNC: 4.3 MMOL/L (ref 3.6–5.5)
PROT SERPL-MCNC: 6.5 G/DL (ref 6–8.2)
RBC # BLD AUTO: 4.66 M/UL (ref 4.2–5.4)
RBC BLD AUTO: PRESENT
SIGNIFICANT IND 70042: NORMAL
SITE SITE: NORMAL
SODIUM SERPL-SCNC: 143 MMOL/L (ref 135–145)
SOURCE SOURCE: NORMAL
STOMATOCYTES BLD QL SMEAR: NORMAL
TROPONIN T SERPL-MCNC: 30 NG/L (ref 6–19)
TROPONIN T SERPL-MCNC: 31 NG/L (ref 6–19)
WBC # BLD AUTO: 11.4 K/UL (ref 4.8–10.8)

## 2024-07-28 PROCEDURE — 96374 THER/PROPH/DIAG INJ IV PUSH: CPT

## 2024-07-28 PROCEDURE — 99285 EMERGENCY DEPT VISIT HI MDM: CPT

## 2024-07-28 PROCEDURE — 84484 ASSAY OF TROPONIN QUANT: CPT | Mod: 91

## 2024-07-28 PROCEDURE — 80053 COMPREHEN METABOLIC PANEL: CPT

## 2024-07-28 PROCEDURE — 85027 COMPLETE CBC AUTOMATED: CPT

## 2024-07-28 PROCEDURE — 87040 BLOOD CULTURE FOR BACTERIA: CPT

## 2024-07-28 PROCEDURE — 93005 ELECTROCARDIOGRAM TRACING: CPT

## 2024-07-28 PROCEDURE — 700105 HCHG RX REV CODE 258: Mod: UD | Performed by: EMERGENCY MEDICINE

## 2024-07-28 PROCEDURE — 93005 ELECTROCARDIOGRAM TRACING: CPT | Performed by: EMERGENCY MEDICINE

## 2024-07-28 PROCEDURE — 71045 X-RAY EXAM CHEST 1 VIEW: CPT

## 2024-07-28 PROCEDURE — 85007 BL SMEAR W/DIFF WBC COUNT: CPT

## 2024-07-28 PROCEDURE — 36415 COLL VENOUS BLD VENIPUNCTURE: CPT

## 2024-07-28 PROCEDURE — RXMED WILLOW AMBULATORY MEDICATION CHARGE: Performed by: EMERGENCY MEDICINE

## 2024-07-28 PROCEDURE — 700111 HCHG RX REV CODE 636 W/ 250 OVERRIDE (IP): Mod: JZ,UD | Performed by: EMERGENCY MEDICINE

## 2024-07-28 RX ORDER — CEFTRIAXONE 2 G/1
2000 INJECTION, POWDER, FOR SOLUTION INTRAMUSCULAR; INTRAVENOUS ONCE
Status: COMPLETED | OUTPATIENT
Start: 2024-07-28 | End: 2024-07-28

## 2024-07-28 RX ORDER — CEFDINIR 300 MG/1
300 CAPSULE ORAL 2 TIMES DAILY
Qty: 20 CAPSULE | Refills: 0 | Status: ON HOLD | OUTPATIENT
Start: 2024-07-28 | End: 2024-08-20

## 2024-07-28 RX ORDER — SODIUM CHLORIDE 9 MG/ML
1000 INJECTION, SOLUTION INTRAVENOUS ONCE
Status: COMPLETED | OUTPATIENT
Start: 2024-07-28 | End: 2024-07-28

## 2024-07-28 RX ADMIN — SODIUM CHLORIDE 1000 ML: 9 INJECTION, SOLUTION INTRAVENOUS at 14:14

## 2024-07-28 RX ADMIN — CEFTRIAXONE SODIUM 2000 MG: 2 INJECTION, POWDER, FOR SOLUTION INTRAMUSCULAR; INTRAVENOUS at 16:15

## 2024-07-28 ASSESSMENT — FIBROSIS 4 INDEX: FIB4 SCORE: 0.53

## 2024-07-28 NOTE — ED PROVIDER NOTES
ER Provider Note    Scribed for Troy Randolph D.O. by Alisa Delacruz. 7/28/2024  1:29 PM    Primary Care Provider: Pcp Pt States None    CHIEF COMPLAINT  Chief Complaint   Patient presents with    Chest Pain     Pt BIBA from [facility] for chest pain that started yesterday morning. Pt states it is currently 8/10 dull ache, has not changed since yesterday AM     Pt has hx afib     Hypotension     Pt (+) for orthostatic hypotension on scene per EMS     94/58 sitting  78/59 standing       HPI/ROS    Lorene Haro is a 48 y.o. female who presents to the Emergency Department for weakness and dizziness and central chest pain onset yesterday. Patient notes that she is nauseated and has difficulty breathing. The patient reports that taking a deep breath makes her pain worse. There were no alleviating factors reported. Denies any congestion, but notes that she has been coughing. Patient has a history of a-fib and takes blood thinners for this. Denies any history of MI or lung problems. Patient states that she has a history of COPD and uses oxygen at night.     ROS as per HPI.    PAST MEDICAL HISTORY  Past Medical History:   Diagnosis Date    A-fib (HCC)     MONI (acute kidney injury) (HCC) 8/9/2023    Asthma     Bipolar 2 disorder (HCC)     Chickenpox     Chronic obstructive pulmonary disease (HCC)     Hypertension     On home oxygen therapy     Other psychological stress     Schizophrenic disorder (HCC)     Yeast infection of the skin 04/01/2021       SURGICAL HISTORY  Past Surgical History:   Procedure Laterality Date    CHOLECYSTECTOMY      COLECTOMY      HYSTERECTOMY LAPAROSCOPY      KNEE ARTHROSCOPY Left        FAMILY HISTORY  Family History   Problem Relation Age of Onset    No Known Problems Mother     Cancer Sister        SOCIAL HISTORY   reports that she quit smoking about 21 months ago. Her smoking use included cigarettes. She has never used smokeless tobacco. She reports that she does not  "currently use alcohol. She reports that she does not use drugs.    CURRENT MEDICATIONS  Previous Medications    ADVAIR DISKUS 100-50 MCG/ACT AEROSOL POWDER, BREATH ACTIVATED    Inhale 1 Puff 2 times a day.    ALBUTEROL 108 (90 BASE) MCG/ACT AERO SOLN INHALATION AEROSOL    Inhale 2 Puffs every 6 hours as needed for Shortness of Breath.    APIXABAN (ELIQUIS) 5MG TAB    Take 1 Tablet by mouth 2 times a day.    ARIPIPRAZOLE (ABILIFY) 30 MG TABLET    Take 1 Tablet by mouth every morning. Indications: Major Depressive Disorder    ATORVASTATIN (LIPITOR) 20 MG TAB    Take 1 Tablet by mouth at bedtime.    BENZONATATE (TESSALON) 100 MG CAP    Take 1 Capsule by mouth 3 times a day as needed for Cough.    FLUTICASONE (FLONASE) 50 MCG/ACT NASAL SPRAY    Administer 2 Sprays into each nostril every morning.    GUAIFENESIN (ROBITUSSIN) 100 MG/5ML LIQUID    Take 10 mL by mouth every four hours as needed for Cough.    HOME CARE OXYGEN    Inhale 2 L/min at bedtime. DME [facility] (now Preferred)    MECLIZINE (ANTIVERT) 25 MG TAB    Take 1 Tablet by mouth 3 times a day as needed for Vertigo.    METOPROLOL SR (TOPROL XL) 25 MG TABLET SR 24 HR    Take 25 mg by mouth every day.    MONTELUKAST (SINGULAIR) 10 MG TAB    Take 1 Tablet by mouth every evening.    NICOTINE (NICODERM) 21 MG/24HR PATCH 24 HR    Place 1 Patch on the skin every 24 hours.       ALLERGIES  Penicillin g and Amoxicillin    PHYSICAL EXAM  /57   Pulse 64   Temp 36.4 °C (97.6 °F) (Temporal)   Resp 18   Ht 1.549 m (5' 1\")   Wt (!) 126 kg (278 lb)   LMP  (LMP Unknown)   SpO2 93%   BMI 52.53 kg/m²     General: No acute distress. BMI greater than 30   HENT: Normocephalic, Mucus membranes are dry.  Chest: Lungs have even and unlabored respirations, Clear to auscultation.   Cardiovascular: Regular rate and regular rhythm, No peripheral cyanosis.  Abdomen: Non distended.  Neuro: Awake, Conversive, Able to relay recent events.  Psychiatric: Calm and cooperative. "     INITIAL ASSESSMENT  Patient present with continuous chest pain that started yesterday that worsens with deep breaths. She is not active, so it is unknown if her symptoms are changed with activity. She has a history of atrial fibrillation and COPD. Will evaluate for cardiac injury and pneumonia.     EMS report noted orthostatic hypotension. Will give IV fluids and evaluate for electrolyte imbalance and anemia.     ED Observation Status? No; Patient does not meet criteria for ED Observation.     DIAGNOSTIC STUDIES    Labs:   Results for orders placed or performed during the hospital encounter of 07/28/24   CBC with Differential   Result Value Ref Range    WBC 11.4 (H) 4.8 - 10.8 K/uL    RBC 4.66 4.20 - 5.40 M/uL    Hemoglobin 12.5 12.0 - 16.0 g/dL    Hematocrit 41.8 37.0 - 47.0 %    MCV 89.7 81.4 - 97.8 fL    MCH 26.8 (L) 27.0 - 33.0 pg    MCHC 29.9 (L) 32.2 - 35.5 g/dL    RDW 52.9 (H) 35.9 - 50.0 fL    Platelet Count 214 164 - 446 K/uL    MPV 10.9 9.0 - 12.9 fL    Neutrophils-Polys 84.40 (H) 44.00 - 72.00 %    Lymphocytes 13.00 (L) 22.00 - 41.00 %    Monocytes 2.60 0.00 - 13.40 %    Eosinophils 0.00 0.00 - 6.90 %    Basophils 0.00 0.00 - 1.80 %    Nucleated RBC 0.00 0.00 - 0.20 /100 WBC    Neutrophils (Absolute) 9.62 (H) 1.82 - 7.42 K/uL    Lymphs (Absolute) 1.48 1.00 - 4.80 K/uL    Monos (Absolute) 0.30 0.00 - 0.85 K/uL    Eos (Absolute) 0.00 0.00 - 0.51 K/uL    Baso (Absolute) 0.00 0.00 - 0.12 K/uL    NRBC (Absolute) 0.00 K/uL    Anisocytosis 1+     Microcytosis 2+ (A)    Complete Metabolic Panel (CMP)   Result Value Ref Range    Sodium 143 135 - 145 mmol/L    Potassium 4.3 3.6 - 5.5 mmol/L    Chloride 109 96 - 112 mmol/L    Co2 26 20 - 33 mmol/L    Anion Gap 8.0 7.0 - 16.0    Glucose 134 (H) 65 - 99 mg/dL    Bun 13 8 - 22 mg/dL    Creatinine 0.80 0.50 - 1.40 mg/dL    Calcium 9.3 8.5 - 10.5 mg/dL    Correct Calcium 9.5 8.5 - 10.5 mg/dL    AST(SGOT) 10 (L) 12 - 45 U/L    ALT(SGPT) 11 2 - 50 U/L    Alkaline  Phosphatase 116 (H) 30 - 99 U/L    Total Bilirubin 0.8 0.1 - 1.5 mg/dL    Albumin 3.7 3.2 - 4.9 g/dL    Total Protein 6.5 6.0 - 8.2 g/dL    Globulin 2.8 1.9 - 3.5 g/dL    A-G Ratio 1.3 g/dL   Troponins NOW   Result Value Ref Range    Troponin T 31 (H) 6 - 19 ng/L   ESTIMATED GFR   Result Value Ref Range    GFR (CKD-EPI) 89 >60 mL/min/1.73 m 2   DIFFERENTIAL MANUAL   Result Value Ref Range    Manual Diff Status PERFORMED    PERIPHERAL SMEAR REVIEW   Result Value Ref Range    Peripheral Smear Review see below    PLATELET ESTIMATE   Result Value Ref Range    Plt Estimation Normal    MORPHOLOGY   Result Value Ref Range    RBC Morphology Present     Poikilocytosis 1+     Ovalocytes 2+     Tear Drop Cells 1+     Stomatocytes 1+    EKG   Result Value Ref Range    Report       [facility] Emergency Dept.    Test Date:  2024  Pt Name:    ADELINA MILLAN                Department: ER  MRN:        [id number]                      Room:       [facility]  Gender:     Female                       Technician: [id number]  :        1975                   Requested By:ER TRIAGE PROTOCOL  Order #:    [id number]                    Reading MD: DAPHNE MEDINA D.O.    Measurements  Intervals                                Axis  Rate:       66                           P:          49  NV:         129                          QRS:        70  QRSD:       107                          T:          -1  QT:         396  QTc:        415    Interpretive Statements  Sinus rhythm  Low voltage, precordial leads  RSR' in V1 or V2, probably normal variant  Compared to ECG 2024 16:02:04  T-wave abnormality no longer present  Electronically Signed On 2024 16:03:53 PDT by DAPHNE MEDINA D.O.       *Note: Due to a large number of results and/or encounters for the requested time period, some results have not been displayed. A complete set of results can be found in Results Review.        EKG:   I have independently  interpreted the above EKG.    Radiology:   The attending emergency physician has independently interpreted the diagnostic imaging associated with this visit and am waiting the final reading from the radiologist.   Preliminary interpretation is as follows: Questionable left lower lobe pneumonia  Radiologist interpretation:   DX-CHEST-PORTABLE (1 VIEW)   Final Result      Mild left basilar opacity suggesting atelectasis versus mild infiltrates.           COURSE & MEDICAL DECISION MAKING     COURSE AND PLAN  1:29 PM - Patient seen and examined at bedside. Discussed plan of care, including ordering labs, imaging and EKG to further evaluate. Patient agrees to the plan of care. The patient will be resuscitated with 1L NS IV. Ordered for DX-chest, CBC with diff, CMP, Troponins, EKG to evaluate her symptoms.     4:07 PM - Patient was reevaluated at bedside. Discussed lab and radiology results with the patient and informed them that their chest x-ray suggests pneumonia. Discussed the plan for discharge with antibiotics. I advised her to use Motrin and Tylenol for any discomfort and to return for any changes or worsening symptoms and to follow-up with her PCP within the week. Patient verbalizes understanding and agreement to this plan of care.        ED Summary: Patient presented with reproducible chest pain worse with deep breath this most consistent with pleuritic pain.  She does use oxygen at home, and on her home dose she is not hypoxic.  X-ray shows questionable mild left lower lobe pneumonia which may be the etiology of her discomfort she is started on antibiotics here.  The remainder of her evaluation shows no concerns for electrolyte imbalance, or anemia with this she is stable for discharge home with outpatient treatment of pneumonia      HYDRATION: Based on the patient's presentation of Hypotension the patient was given IV fluids. IV Hydration was used because oral hydration was not adequate alone. Upon recheck  following hydration, the patient was improved.    DISPOSITION AND DISCUSSIONS    Discussion of management with other QHP or appropriate source(s): RT    Barriers to care at this time, including but not limited to: The patient does not have an established PCP.      The patient will return for new or worsening symptoms and is stable at the time of discharge.    The patient is referred to a primary physician for blood pressure management, diabetic screening, and for all other preventative health concerns.    DISPOSITION:  Patient will be discharged home in stable condition.    FOLLOW UP:  No follow-up provider specified.    OUTPATIENT MEDICATIONS:  New Prescriptions    CEFDINIR (OMNICEF) 300 MG CAP    Take 1 Capsule by mouth 2 times a day.      FINAL DIAGNOSIS  1. Other chest pain    2. Pneumonia of left lower lobe due to infectious organism      Alisa HARRIS (Ade), am scribing for, and in the presence of, Troy Randolph D.O..    Electronically signed by: Alisa Delacruz (Ade), 7/28/2024    ITroy D.O. personally performed the services described in this documentation, as scribed by Alisa Delacruz in my presence, and it is both accurate and complete.     The note accurately reflects work and decisions made by me.  Troy Randolph D.O.  7/28/2024  4:36 PM

## 2024-07-28 NOTE — DISCHARGE INSTRUCTIONS
Chest x-ray suggesting pneumonia.  You are being placed on antibiotics here and discharged home with antibiotics.  Use Motrin Tylenol for discomfort.  Return for any change or worsening symptoms.  Please follow-up with your primary care doctor within the week for follow-up.

## 2024-07-28 NOTE — ED TRIAGE NOTES
".  Chief Complaint   Patient presents with    Chest Pain     Pt BIBA from [facility] for chest pain that started yesterday morning. Pt states it is currently 8/10 dull ache, has not changed since yesterday AM     Pt has hx afib     Hypotension     Pt (+) for orthostatic hypotension on scene per EMS     94/58 sitting  78/59 standing         ./57   Pulse 64   Temp 36.4 °C (97.6 °F) (Temporal)   Resp 18   Ht 1.549 m (5' 1\")   Wt (!) 126 kg (278 lb)   LMP  (LMP Unknown)   SpO2 96%   BMI 52.53 kg/m²   "

## 2024-07-29 NOTE — DISCHARGE PLANNING
KETTY contacted by RN seeking assistance with transportation.  KETTY noted pt has medicaid, called [employer] and set up cab ride, pt has walker with her and can take a regular ambulatory ride home.  KETTY confirmed pt address with patient, set up [employer] ride to correct address.  [employer] confirmed they will be here within 15-30 minutes- RN notified.

## 2024-07-30 ENCOUNTER — APPOINTMENT (OUTPATIENT)
Dept: RADIOLOGY | Facility: MEDICAL CENTER | Age: 49
End: 2024-07-30
Attending: EMERGENCY MEDICINE
Payer: MEDICAID

## 2024-07-30 ENCOUNTER — HOSPITAL ENCOUNTER (OUTPATIENT)
Facility: MEDICAL CENTER | Age: 49
End: 2024-07-31
Attending: EMERGENCY MEDICINE | Admitting: INTERNAL MEDICINE
Payer: MEDICAID

## 2024-07-30 DIAGNOSIS — I49.9 CARDIAC ARRHYTHMIA, UNSPECIFIED CARDIAC ARRHYTHMIA TYPE: ICD-10-CM

## 2024-07-30 DIAGNOSIS — R07.9 CHEST PAIN, UNSPECIFIED TYPE: ICD-10-CM

## 2024-07-30 DIAGNOSIS — R53.83 OTHER FATIGUE: ICD-10-CM

## 2024-07-30 DIAGNOSIS — R07.2 PRECORDIAL PAIN: ICD-10-CM

## 2024-07-30 PROBLEM — J18.9 PNEUMONIA: Status: ACTIVE | Noted: 2021-10-06

## 2024-07-30 PROBLEM — Z71.89 ADVANCE CARE PLANNING: Status: ACTIVE | Noted: 2024-07-30

## 2024-07-30 LAB
ALBUMIN SERPL BCP-MCNC: 4 G/DL (ref 3.2–4.9)
ALBUMIN/GLOB SERPL: 1.4 G/DL
ALP SERPL-CCNC: 121 U/L (ref 30–99)
ALT SERPL-CCNC: 14 U/L (ref 2–50)
ANION GAP SERPL CALC-SCNC: 11 MMOL/L (ref 7–16)
ANISOCYTOSIS BLD QL SMEAR: ABNORMAL
APPEARANCE UR: CLEAR
APTT PPP: 31.5 SEC (ref 24.7–36)
AST SERPL-CCNC: 11 U/L (ref 12–45)
BACTERIA #/AREA URNS HPF: NEGATIVE /HPF
BACTERIA BLD CULT: NORMAL
BACTERIA BLD CULT: NORMAL
BASOPHILS # BLD AUTO: 0.1 % (ref 0–1.8)
BASOPHILS # BLD: 0.01 K/UL (ref 0–0.12)
BILIRUB SERPL-MCNC: 1.2 MG/DL (ref 0.1–1.5)
BILIRUB UR QL STRIP.AUTO: NEGATIVE
BUN SERPL-MCNC: 11 MG/DL (ref 8–22)
CALCIUM ALBUM COR SERPL-MCNC: 9.8 MG/DL (ref 8.5–10.5)
CALCIUM SERPL-MCNC: 9.8 MG/DL (ref 8.5–10.5)
CAOX CRY #/AREA URNS HPF: ABNORMAL /HPF
CHLORIDE SERPL-SCNC: 107 MMOL/L (ref 96–112)
CO2 SERPL-SCNC: 28 MMOL/L (ref 20–33)
COLOR UR: YELLOW
COMMENT 1642: NORMAL
CREAT SERPL-MCNC: 0.81 MG/DL (ref 0.5–1.4)
EKG IMPRESSION: NORMAL
EOSINOPHIL # BLD AUTO: 0.09 K/UL (ref 0–0.51)
EOSINOPHIL NFR BLD: 0.9 % (ref 0–6.9)
EPI CELLS #/AREA URNS HPF: ABNORMAL /HPF
ERYTHROCYTE [DISTWIDTH] IN BLOOD BY AUTOMATED COUNT: 53 FL (ref 35.9–50)
FLUAV RNA SPEC QL NAA+PROBE: NEGATIVE
FLUBV RNA SPEC QL NAA+PROBE: NEGATIVE
GFR SERPLBLD CREATININE-BSD FMLA CKD-EPI: 88 ML/MIN/1.73 M 2
GLOBULIN SER CALC-MCNC: 2.9 G/DL (ref 1.9–3.5)
GLUCOSE SERPL-MCNC: 79 MG/DL (ref 65–99)
GLUCOSE UR STRIP.AUTO-MCNC: NEGATIVE MG/DL
HCT VFR BLD AUTO: 43.5 % (ref 37–47)
HGB BLD-MCNC: 12.9 G/DL (ref 12–16)
HYALINE CASTS #/AREA URNS LPF: ABNORMAL /LPF
IMM GRANULOCYTES # BLD AUTO: 0.04 K/UL (ref 0–0.11)
IMM GRANULOCYTES NFR BLD AUTO: 0.4 % (ref 0–0.9)
INR PPP: 1.17 (ref 0.87–1.13)
KETONES UR STRIP.AUTO-MCNC: ABNORMAL MG/DL
LACTATE SERPL-SCNC: 1.3 MMOL/L (ref 0.5–2)
LEUKOCYTE ESTERASE UR QL STRIP.AUTO: ABNORMAL
LIPASE SERPL-CCNC: 19 U/L (ref 11–82)
LYMPHOCYTES # BLD AUTO: 1.76 K/UL (ref 1–4.8)
LYMPHOCYTES NFR BLD: 17.5 % (ref 22–41)
MAGNESIUM SERPL-MCNC: 2.2 MG/DL (ref 1.5–2.5)
MAGNESIUM SERPL-MCNC: 2.2 MG/DL (ref 1.5–2.5)
MCH RBC QN AUTO: 26.7 PG (ref 27–33)
MCHC RBC AUTO-ENTMCNC: 29.7 G/DL (ref 32.2–35.5)
MCV RBC AUTO: 89.9 FL (ref 81.4–97.8)
MICRO URNS: ABNORMAL
MICROCYTES BLD QL SMEAR: ABNORMAL
MONOCYTES # BLD AUTO: 0.76 K/UL (ref 0–0.85)
MONOCYTES NFR BLD AUTO: 7.5 % (ref 0–13.4)
MORPHOLOGY BLD-IMP: NORMAL
NEUTROPHILS # BLD AUTO: 7.41 K/UL (ref 1.82–7.42)
NEUTROPHILS NFR BLD: 73.6 % (ref 44–72)
NITRITE UR QL STRIP.AUTO: NEGATIVE
NRBC # BLD AUTO: 0 K/UL
NRBC BLD-RTO: 0 /100 WBC (ref 0–0.2)
NT-PROBNP SERPL IA-MCNC: 228 PG/ML (ref 0–125)
PH UR STRIP.AUTO: 5.5 [PH] (ref 5–8)
PHOSPHATE SERPL-MCNC: 2.2 MG/DL (ref 2.5–4.5)
PHOSPHATE SERPL-MCNC: 2.6 MG/DL (ref 2.5–4.5)
PLATELET # BLD AUTO: 231 K/UL (ref 164–446)
PLATELET BLD QL SMEAR: NORMAL
PMV BLD AUTO: 11.4 FL (ref 9–12.9)
POTASSIUM SERPL-SCNC: 4.2 MMOL/L (ref 3.6–5.5)
PROCALCITONIN SERPL-MCNC: 0.06 NG/ML
PROT SERPL-MCNC: 6.9 G/DL (ref 6–8.2)
PROT UR QL STRIP: 30 MG/DL
PROTHROMBIN TIME: 15.1 SEC (ref 12–14.6)
RBC # BLD AUTO: 4.84 M/UL (ref 4.2–5.4)
RBC # URNS HPF: ABNORMAL /HPF
RBC BLD AUTO: PRESENT
RBC UR QL AUTO: NEGATIVE
RSV RNA SPEC QL NAA+PROBE: NEGATIVE
SARS-COV-2 RNA RESP QL NAA+PROBE: NOTDETECTED
SIGNIFICANT IND 70042: NORMAL
SIGNIFICANT IND 70042: NORMAL
SITE SITE: NORMAL
SITE SITE: NORMAL
SODIUM SERPL-SCNC: 146 MMOL/L (ref 135–145)
SOURCE SOURCE: NORMAL
SOURCE SOURCE: NORMAL
SP GR UR STRIP.AUTO: 1.02
TROPONIN T SERPL-MCNC: 34 NG/L (ref 6–19)
TROPONIN T SERPL-MCNC: 36 NG/L (ref 6–19)
TROPONIN T SERPL-MCNC: 36 NG/L (ref 6–19)
TSH SERPL DL<=0.005 MIU/L-ACNC: 0.79 UIU/ML (ref 0.38–5.33)
UROBILINOGEN UR STRIP.AUTO-MCNC: 1 MG/DL
WBC # BLD AUTO: 10.1 K/UL (ref 4.8–10.8)
WBC #/AREA URNS HPF: ABNORMAL /HPF

## 2024-07-30 PROCEDURE — 700117 HCHG RX CONTRAST REV CODE 255: Mod: UD | Performed by: EMERGENCY MEDICINE

## 2024-07-30 PROCEDURE — 99223 1ST HOSP IP/OBS HIGH 75: CPT | Mod: 25 | Performed by: INTERNAL MEDICINE

## 2024-07-30 PROCEDURE — G0378 HOSPITAL OBSERVATION PER HR: HCPCS

## 2024-07-30 PROCEDURE — 83690 ASSAY OF LIPASE: CPT

## 2024-07-30 PROCEDURE — 0241U HCHG SARS-COV-2 COVID-19 NFCT DS RESP RNA 4 TRGT ED POC: CPT

## 2024-07-30 PROCEDURE — 99497 ADVNCD CARE PLAN 30 MIN: CPT | Performed by: INTERNAL MEDICINE

## 2024-07-30 PROCEDURE — 80053 COMPREHEN METABOLIC PANEL: CPT

## 2024-07-30 PROCEDURE — A9270 NON-COVERED ITEM OR SERVICE: HCPCS | Mod: UD | Performed by: INTERNAL MEDICINE

## 2024-07-30 PROCEDURE — 81001 URINALYSIS AUTO W/SCOPE: CPT

## 2024-07-30 PROCEDURE — 84100 ASSAY OF PHOSPHORUS: CPT

## 2024-07-30 PROCEDURE — 83880 ASSAY OF NATRIURETIC PEPTIDE: CPT

## 2024-07-30 PROCEDURE — 99285 EMERGENCY DEPT VISIT HI MDM: CPT

## 2024-07-30 PROCEDURE — 700102 HCHG RX REV CODE 250 W/ 637 OVERRIDE(OP): Mod: UD | Performed by: EMERGENCY MEDICINE

## 2024-07-30 PROCEDURE — 96374 THER/PROPH/DIAG INJ IV PUSH: CPT

## 2024-07-30 PROCEDURE — 36415 COLL VENOUS BLD VENIPUNCTURE: CPT

## 2024-07-30 PROCEDURE — 83735 ASSAY OF MAGNESIUM: CPT

## 2024-07-30 PROCEDURE — 87086 URINE CULTURE/COLONY COUNT: CPT

## 2024-07-30 PROCEDURE — 83605 ASSAY OF LACTIC ACID: CPT

## 2024-07-30 PROCEDURE — 700111 HCHG RX REV CODE 636 W/ 250 OVERRIDE (IP): Mod: JZ,UD | Performed by: EMERGENCY MEDICINE

## 2024-07-30 PROCEDURE — 84145 PROCALCITONIN (PCT): CPT

## 2024-07-30 PROCEDURE — 85610 PROTHROMBIN TIME: CPT

## 2024-07-30 PROCEDURE — 87040 BLOOD CULTURE FOR BACTERIA: CPT

## 2024-07-30 PROCEDURE — 85025 COMPLETE CBC W/AUTO DIFF WBC: CPT

## 2024-07-30 PROCEDURE — 71045 X-RAY EXAM CHEST 1 VIEW: CPT

## 2024-07-30 PROCEDURE — 700102 HCHG RX REV CODE 250 W/ 637 OVERRIDE(OP): Mod: UD | Performed by: INTERNAL MEDICINE

## 2024-07-30 PROCEDURE — 84484 ASSAY OF TROPONIN QUANT: CPT

## 2024-07-30 PROCEDURE — 84443 ASSAY THYROID STIM HORMONE: CPT

## 2024-07-30 PROCEDURE — A9270 NON-COVERED ITEM OR SERVICE: HCPCS | Mod: UD | Performed by: EMERGENCY MEDICINE

## 2024-07-30 PROCEDURE — 85730 THROMBOPLASTIN TIME PARTIAL: CPT

## 2024-07-30 PROCEDURE — 74177 CT ABD & PELVIS W/CONTRAST: CPT

## 2024-07-30 PROCEDURE — 93005 ELECTROCARDIOGRAM TRACING: CPT | Performed by: EMERGENCY MEDICINE

## 2024-07-30 RX ORDER — ATORVASTATIN CALCIUM 20 MG/1
20 TABLET, FILM COATED ORAL
Status: DISCONTINUED | OUTPATIENT
Start: 2024-07-30 | End: 2024-07-31 | Stop reason: HOSPADM

## 2024-07-30 RX ORDER — AZITHROMYCIN 250 MG/1
500 TABLET, FILM COATED ORAL DAILY
Status: DISCONTINUED | OUTPATIENT
Start: 2024-07-31 | End: 2024-07-31 | Stop reason: HOSPADM

## 2024-07-30 RX ORDER — PROCHLORPERAZINE EDISYLATE 5 MG/ML
5-10 INJECTION INTRAMUSCULAR; INTRAVENOUS EVERY 4 HOURS PRN
Status: DISCONTINUED | OUTPATIENT
Start: 2024-07-30 | End: 2024-07-31 | Stop reason: HOSPADM

## 2024-07-30 RX ORDER — FLUTICASONE PROPIONATE AND SALMETEROL 100; 50 UG/1; UG/1
1 POWDER RESPIRATORY (INHALATION) 2 TIMES DAILY
Status: DISCONTINUED | OUTPATIENT
Start: 2024-07-30 | End: 2024-07-30

## 2024-07-30 RX ORDER — HYDRALAZINE HYDROCHLORIDE 20 MG/ML
10 INJECTION INTRAMUSCULAR; INTRAVENOUS EVERY 4 HOURS PRN
Status: DISCONTINUED | OUTPATIENT
Start: 2024-07-30 | End: 2024-07-31 | Stop reason: HOSPADM

## 2024-07-30 RX ORDER — HEPARIN SODIUM 5000 [USP'U]/ML
5000 INJECTION, SOLUTION INTRAVENOUS; SUBCUTANEOUS EVERY 8 HOURS
Status: DISCONTINUED | OUTPATIENT
Start: 2024-07-30 | End: 2024-07-30

## 2024-07-30 RX ORDER — ACETAMINOPHEN 325 MG/1
650 TABLET ORAL EVERY 6 HOURS PRN
Status: DISCONTINUED | OUTPATIENT
Start: 2024-07-30 | End: 2024-07-31 | Stop reason: HOSPADM

## 2024-07-30 RX ORDER — MONTELUKAST SODIUM 10 MG/1
10 TABLET ORAL EVERY EVENING
Status: DISCONTINUED | OUTPATIENT
Start: 2024-07-30 | End: 2024-07-31 | Stop reason: HOSPADM

## 2024-07-30 RX ORDER — METOPROLOL SUCCINATE 25 MG/1
25 TABLET, EXTENDED RELEASE ORAL DAILY
Status: DISCONTINUED | OUTPATIENT
Start: 2024-07-31 | End: 2024-07-31 | Stop reason: HOSPADM

## 2024-07-30 RX ORDER — ARIPIPRAZOLE 15 MG/1
30 TABLET ORAL EVERY MORNING
Status: DISCONTINUED | OUTPATIENT
Start: 2024-07-31 | End: 2024-07-31 | Stop reason: HOSPADM

## 2024-07-30 RX ORDER — PROMETHAZINE HYDROCHLORIDE 25 MG/1
12.5-25 SUPPOSITORY RECTAL EVERY 4 HOURS PRN
Status: DISCONTINUED | OUTPATIENT
Start: 2024-07-30 | End: 2024-07-31 | Stop reason: HOSPADM

## 2024-07-30 RX ORDER — AZITHROMYCIN 250 MG/1
500 TABLET, FILM COATED ORAL ONCE
Status: COMPLETED | OUTPATIENT
Start: 2024-07-30 | End: 2024-07-30

## 2024-07-30 RX ORDER — CEFTRIAXONE 2 G/1
2000 INJECTION, POWDER, FOR SOLUTION INTRAMUSCULAR; INTRAVENOUS ONCE
Status: COMPLETED | OUTPATIENT
Start: 2024-07-30 | End: 2024-07-30

## 2024-07-30 RX ORDER — ASPIRIN 81 MG/1
81 TABLET ORAL DAILY
Status: DISCONTINUED | OUTPATIENT
Start: 2024-07-30 | End: 2024-07-30

## 2024-07-30 RX ORDER — NICOTINE 21 MG/24HR
1 PATCH, TRANSDERMAL 24 HOURS TRANSDERMAL EVERY 24 HOURS
Status: DISCONTINUED | OUTPATIENT
Start: 2024-07-31 | End: 2024-07-31 | Stop reason: HOSPADM

## 2024-07-30 RX ORDER — ONDANSETRON 2 MG/ML
4 INJECTION INTRAMUSCULAR; INTRAVENOUS EVERY 4 HOURS PRN
Status: DISCONTINUED | OUTPATIENT
Start: 2024-07-30 | End: 2024-07-31 | Stop reason: HOSPADM

## 2024-07-30 RX ORDER — ONDANSETRON 4 MG/1
4 TABLET, ORALLY DISINTEGRATING ORAL EVERY 4 HOURS PRN
Status: DISCONTINUED | OUTPATIENT
Start: 2024-07-30 | End: 2024-07-31 | Stop reason: HOSPADM

## 2024-07-30 RX ORDER — PROMETHAZINE HYDROCHLORIDE 25 MG/1
12.5-25 TABLET ORAL EVERY 4 HOURS PRN
Status: DISCONTINUED | OUTPATIENT
Start: 2024-07-30 | End: 2024-07-31 | Stop reason: HOSPADM

## 2024-07-30 RX ADMIN — AZITHROMYCIN DIHYDRATE 500 MG: 250 TABLET ORAL at 14:02

## 2024-07-30 RX ADMIN — MONTELUKAST 10 MG: 10 TABLET, FILM COATED ORAL at 20:04

## 2024-07-30 RX ADMIN — CEFTRIAXONE SODIUM 2000 MG: 2 INJECTION, POWDER, FOR SOLUTION INTRAMUSCULAR; INTRAVENOUS at 14:03

## 2024-07-30 RX ADMIN — ACETAMINOPHEN 650 MG: 325 TABLET ORAL at 20:06

## 2024-07-30 RX ADMIN — ATORVASTATIN CALCIUM 20 MG: 20 TABLET, FILM COATED ORAL at 20:03

## 2024-07-30 RX ADMIN — APIXABAN 5 MG: 5 TABLET, FILM COATED ORAL at 20:03

## 2024-07-30 RX ADMIN — IOHEXOL 100 ML: 350 INJECTION, SOLUTION INTRAVENOUS at 13:58

## 2024-07-30 ASSESSMENT — LIFESTYLE VARIABLES
TOTAL SCORE: 0
AVERAGE NUMBER OF DAYS PER WEEK YOU HAVE A DRINK CONTAINING ALCOHOL: 0
EVER HAD A DRINK FIRST THING IN THE MORNING TO STEADY YOUR NERVES TO GET RID OF A HANGOVER: NO
HAVE PEOPLE ANNOYED YOU BY CRITICIZING YOUR DRINKING: NO
ON A TYPICAL DAY WHEN YOU DRINK ALCOHOL HOW MANY DRINKS DO YOU HAVE: 0
TOTAL SCORE: 0
DOES PATIENT WANT TO STOP DRINKING: NO
TOTAL SCORE: 0
HOW MANY TIMES IN THE PAST YEAR HAVE YOU HAD 5 OR MORE DRINKS IN A DAY: 0
EVER FELT BAD OR GUILTY ABOUT YOUR DRINKING: NO
CONSUMPTION TOTAL: NEGATIVE
ALCOHOL_USE: NO
HAVE YOU EVER FELT YOU SHOULD CUT DOWN ON YOUR DRINKING: NO

## 2024-07-30 ASSESSMENT — ENCOUNTER SYMPTOMS
CHILLS: 1
SPUTUM PRODUCTION: 1
COUGH: 1

## 2024-07-30 ASSESSMENT — PATIENT HEALTH QUESTIONNAIRE - PHQ9
SUM OF ALL RESPONSES TO PHQ9 QUESTIONS 1 AND 2: 2
SUM OF ALL RESPONSES TO PHQ QUESTIONS 1-9: 6
7. TROUBLE CONCENTRATING ON THINGS, SUCH AS READING THE NEWSPAPER OR WATCHING TELEVISION: NOT AT ALL
1. LITTLE INTEREST OR PLEASURE IN DOING THINGS: SEVERAL DAYS
2. FEELING DOWN, DEPRESSED, IRRITABLE, OR HOPELESS: SEVERAL DAYS
6. FEELING BAD ABOUT YOURSELF - OR THAT YOU ARE A FAILURE OR HAVE LET YOURSELF OR YOUR FAMILY DOWN: SEVERAL DAYS
9. THOUGHTS THAT YOU WOULD BE BETTER OFF DEAD, OR OF HURTING YOURSELF: NOT AT ALL
3. TROUBLE FALLING OR STAYING ASLEEP OR SLEEPING TOO MUCH: NOT AT ALL
4. FEELING TIRED OR HAVING LITTLE ENERGY: SEVERAL DAYS
8. MOVING OR SPEAKING SO SLOWLY THAT OTHER PEOPLE COULD HAVE NOTICED. OR THE OPPOSITE, BEING SO FIGETY OR RESTLESS THAT YOU HAVE BEEN MOVING AROUND A LOT MORE THAN USUAL: SEVERAL DAYS
5. POOR APPETITE OR OVEREATING: SEVERAL DAYS

## 2024-07-30 ASSESSMENT — SOCIAL DETERMINANTS OF HEALTH (SDOH)
IN THE PAST 12 MONTHS, HAS THE ELECTRIC, GAS, OIL, OR WATER COMPANY THREATENED TO SHUT OFF SERVICE IN YOUR HOME?: NO
WITHIN THE PAST 12 MONTHS, YOU WORRIED THAT YOUR FOOD WOULD RUN OUT BEFORE YOU GOT THE MONEY TO BUY MORE: NEVER TRUE
WITHIN THE LAST YEAR, HAVE YOU BEEN HUMILIATED OR EMOTIONALLY ABUSED IN OTHER WAYS BY YOUR PARTNER OR EX-PARTNER?: NO
WITHIN THE LAST YEAR, HAVE YOU BEEN AFRAID OF YOUR PARTNER OR EX-PARTNER?: NO
WITHIN THE LAST YEAR, HAVE YOU BEEN KICKED, HIT, SLAPPED, OR OTHERWISE PHYSICALLY HURT BY YOUR PARTNER OR EX-PARTNER?: NO
WITHIN THE LAST YEAR, HAVE TO BEEN RAPED OR FORCED TO HAVE ANY KIND OF SEXUAL ACTIVITY BY YOUR PARTNER OR EX-PARTNER?: NO
WITHIN THE PAST 12 MONTHS, THE FOOD YOU BOUGHT JUST DIDN'T LAST AND YOU DIDN'T HAVE MONEY TO GET MORE: NEVER TRUE

## 2024-07-30 ASSESSMENT — FIBROSIS 4 INDEX
FIB4 SCORE: 0.64
FIB4 SCORE: 0.7
FIB4 SCORE: 0.7

## 2024-07-30 ASSESSMENT — CHA2DS2 SCORE
HYPERTENSION: YES
AGE 75 OR GREATER: NO
SEX: FEMALE
CHA2DS2 VASC SCORE: 2
CHF OR LEFT VENTRICULAR DYSFUNCTION: NO
DIABETES: NO
VASCULAR DISEASE: NO
AGE 65 TO 74: NO
PRIOR STROKE OR TIA OR THROMBOEMBOLISM: NO

## 2024-07-30 ASSESSMENT — PAIN DESCRIPTION - PAIN TYPE
TYPE: ACUTE PAIN
TYPE: ACUTE PAIN

## 2024-07-30 NOTE — ED NOTES
Patient identity verified in [facility]. Patient ambulated independently with four wheel walker with steady gait to yellow 65.    This RN agrees with triage note. Patient connected to continuous monitor with call light and personal belongings within reach. Keo locked and in the lowest position.

## 2024-07-30 NOTE — H&P
"Hospital Medicine History & Physical Note    Date of Service  7/30/2024    Primary Care Physician  Pcp Pt States None    Consultants  None    Code Status  Full Code    Chief Complaint  Chief Complaint   Patient presents with    Fatigue     Pt BIBA from [facility] for fatigue. Pt was diagnosed with PNA a few days ago and was discharged with antibiotics and prednisone. Pt reports increased fatigue and feeling \"unwell\". Pt doesn't believe she was given antibiotics.        History of Presenting Illness  Lorene Haro is a 48 y.o. female with a PMH of obesity, chronic respiratory failure on 2L oxygen at night, COPD, atrial fibrillation on elquis who presented 7/30/2024 with chest pain and fatigue. Patient was recently seen in the ER and was diagnosed with a pneumonia and discharged on antibiotics. Patient states that she continued feeling poorly and decided to return to the ER. She reports a diffuse chest pain with no radiation She reports associated shortness of breath and fatigue. She denies fevers but reports some chills. She denies nausea,vomiting and diarrhea. In the ER she was noted to have a 10 sec run of VAT however the patient was asymptomatic at the time.     EKG interpreted by me reveals atrial flutter with no acute ischemic changes  Chest x-ray interpreted by me reveals left basilar opacity    I discussed the plan of care with patient, bedside RN, and ERP .    Review of Systems  Review of Systems   Constitutional:  Positive for chills and malaise/fatigue.   Respiratory:  Positive for cough and sputum production.    Cardiovascular:  Positive for chest pain.       Past Medical History   has a past medical history of A-fib ([facility]), MONI (acute kidney injury) ([facility]) (8/9/2023), Asthma, Bipolar 2 disorder ([facility]), Chickenpox, Chronic obstructive pulmonary disease ([facility]), Hypertension, On home oxygen therapy, Other psychological stress, Schizophrenic disorder ([facility]), and Yeast infection of the skin " (04/01/2021).    Surgical History   has a past surgical history that includes hysterectomy laparoscopy; colectomy; cholecystectomy; and knee arthroscopy (Left).     Family History  family history includes Cancer in her sister; No Known Problems in her mother.   Family history reviewed with patient. There is no family history that is pertinent to the chief complaint.     Social History   reports that she quit smoking about 21 months ago. Her smoking use included cigarettes. She has never used smokeless tobacco. She reports that she does not currently use alcohol. She reports that she does not use drugs.    Allergies  Allergies   Allergen Reactions    Penicillin G Rash and Itching     pt reports that she gets a rash all over her body and gets itchy    Amoxicillin Rash and Itching     Tolerates cephalosporins, pt reports that she gets a rash all over her body and gets itchy       Medications  Prior to Admission Medications   Prescriptions Last Dose Informant Patient Reported? Taking?   ADVAIR DISKUS 100-50 MCG/ACT AEROSOL POWDER, BREATH ACTIVATED   No No   Sig: Inhale 1 Puff 2 times a day.   Home Care Oxygen   Yes No   Sig: Inhale 2 L/min at bedtime. [facility]   albuterol 108 (90 Base) MCG/ACT Aero Soln inhalation aerosol   No No   Sig: Inhale 2 Puffs every 6 hours as needed for Shortness of Breath.   apixaban (ELIQUIS) 5mg Tab  Patient No No   Sig: Take 1 Tablet by mouth 2 times a day.   aripiprazole (ABILIFY) 30 MG tablet  Patient No No   Sig: Take 1 Tablet by mouth every morning. Indications: Major Depressive Disorder   atorvastatin (LIPITOR) 20 MG Tab  Patient No No   Sig: Take 1 Tablet by mouth at bedtime.   benzonatate (TESSALON) 100 MG Cap   No No   Sig: Take 1 Capsule by mouth 3 times a day as needed for Cough.   cefdinir (OMNICEF) 300 MG Cap   No No   Sig: Take 1 Capsule by mouth 2 times a day.   fluticasone (FLONASE) 50 MCG/ACT nasal spray  Patient Yes No   Sig: Administer 2 Sprays into each  nostril every morning.   guaiFENesin (ROBITUSSIN) 100 MG/5ML liquid   No No   Sig: Take 10 mL by mouth every four hours as needed for Cough.   meclizine (ANTIVERT) 25 MG Tab   No No   Sig: Take 1 Tablet by mouth 3 times a day as needed for Vertigo.   metoprolol SR (TOPROL XL) 25 MG TABLET SR 24 HR  Patient Yes No   Sig: Take 25 mg by mouth every day.   montelukast (SINGULAIR) 10 MG Tab   No No   Sig: Take 1 Tablet by mouth every evening.   nicotine (NICODERM) 21 MG/24HR PATCH 24 HR   No No   Sig: Place 1 Patch on the skin every 24 hours.      Facility-Administered Medications: None       Physical Exam  Temp:  [37.1 °C (98.7 °F)] 37.1 °C (98.7 °F)  Pulse:  [61-73] 73  Resp:  [12-24] 12  BP: (101-133)/(52-69) 123/60  SpO2:  [90 %-96 %] 90 %  Blood Pressure: 123/60   Temperature: 37.1 °C (98.7 °F)   Pulse: 73   Respiration: 12   Pulse Oximetry: 90 %       Physical Exam  Vitals and nursing note reviewed.   Constitutional:       General: She is not in acute distress.     Appearance: She is obese.   HENT:      Head: Normocephalic.      Mouth/Throat:      Mouth: Mucous membranes are moist.   Eyes:      Pupils: Pupils are equal, round, and reactive to light.   Cardiovascular:      Rate and Rhythm: Normal rate and regular rhythm.      Pulses: Normal pulses.      Heart sounds: Normal heart sounds.   Pulmonary:      Effort: Pulmonary effort is normal.      Breath sounds: Normal breath sounds.   Abdominal:      Palpations: Abdomen is soft.      Tenderness: There is no abdominal tenderness.   Musculoskeletal:      Cervical back: Neck supple.      Right lower leg: Edema present.      Left lower leg: Edema present.   Skin:     General: Skin is warm.      Coloration: Skin is not jaundiced.   Neurological:      General: No focal deficit present.      Mental Status: She is alert and oriented to person, place, and time.   Psychiatric:         Mood and Affect: Mood normal.         Behavior: Behavior normal.         Laboratory:  Recent  "Labs     07/28/24  1410 07/30/24  1253   WBC 11.4* 10.1   RBC 4.66 4.84   HEMOGLOBIN 12.5 12.9   HEMATOCRIT 41.8 43.5   MCV 89.7 89.9   MCH 26.8* 26.7*   MCHC 29.9* 29.7*   RDW 52.9* 53.0*   PLATELETCT 214 231   MPV 10.9 11.4     Recent Labs     07/28/24  1410 07/30/24  1253   SODIUM 143 146*   POTASSIUM 4.3 4.2   CHLORIDE 109 107   CO2 26 28   GLUCOSE 134* 79   BUN 13 11   CREATININE 0.80 0.81   CALCIUM 9.3 9.8     Recent Labs     07/28/24  1410 07/30/24  1253   ALTSGPT 11 14   ASTSGOT 10* 11*   ALKPHOSPHAT 116* 121*   TBILIRUBIN 0.8 1.2   LIPASE  --  19   GLUCOSE 134* 79     Recent Labs     07/30/24  1253   APTT 31.5   INR 1.17*     No results for input(s): \"NTPROBNP\" in the last 72 hours.      Recent Labs     07/28/24  1611 07/30/24  1253 07/30/24  1447   TROPONINT 30* 36* 36*       Imaging:  CT-ABDOMEN-PELVIS WITH   Final Result      1. No acute inflammatory process in the abdomen or pelvis.   2. Absent uterus, right ovary and gallbladder.   3. Stable hepatomegaly.      DX-CHEST-PORTABLE (1 VIEW)   Final Result      Mild left basilar opacity which may be atelectasis versus infiltrate.      EC-ECHOCARDIOGRAM COMPLETE W/O CONT    (Results Pending)         Assessment/Plan:  Justification for Admission Status  I anticipate this patient is appropriate for observation status at this time because chest pain    Patient will need a Telemetry bed on EMERGENCY service .  The need is secondary to ACS rule out.    * Chest pain- (present on admission)  Assessment & Plan  The 10-year ASCVD risk score (Ken BYRNES, et al., 2019) is: 0.8%    Values used to calculate the score:      Age: 48 years      Sex: Female      Is Non- : No      Diabetic: No      Tobacco smoker: No      Systolic Blood Pressure: 123 mmHg      Is BP treated: No      HDL Cholesterol: 44 mg/dL      Total Cholesterol: 130 mg/dL    Likely due to pneumonia however will rule out ACS  Continuous cardiac monitoring with serial EKG and " troponin  Check 2D echo  Check lipid panel, TSH and hemoglobin A1c  Nitro when necessary for chest pain        Advance care planning  Assessment & Plan  I discussed advance care planning for at least 16 minutes with the patient, including her diagnosis of morbid obesity, restrictive lung disease, COPD, atrial fibrillation, schizophrenia, respiratory failure.  We discussed the prognosis, plan of care, risks and benefits of any therapies that could be offered, as well as alternatives including palliation. The patient has opted to remain full code at this time and would like chest compressions and intubation if necessary. Time spent is exclusive of evaluation and management or other separately billable procedures.       Class 3 severe obesity in adult (HCC)- (present on admission)  Assessment & Plan  Body mass index is 51.82 kg/m².  Recommended outpatient weight management program and bariatric surgery evaluation      Restrictive lung disease secondary to obesity- (present on admission)  Assessment & Plan  Continue RT protocol and inhalers  Recommend outpatient bariatric surgery and pulmonology evaluation    Pneumonia- (present on admission)  Assessment & Plan  Patient has been started on IV ceftriaxone and azithromycin  Check pro calcitonin, if within normal limits we'll consider de-escalating antibiotics  RT protocol  Follow blood cultures      KATHIA (obstructive sleep apnea)- (present on admission)  Assessment & Plan  Patient does not use CPAP at night, only uses nocturnal oxygen at 2 L          VTE prophylaxis: therapeutic anticoagulation with eliquis

## 2024-07-30 NOTE — ED PROVIDER NOTES
"ED Provider Note    CHIEF COMPLAINT  Chief Complaint   Patient presents with    Fatigue     Pt BIBA from [facility] for fatigue. Pt was diagnosed with PNA a few days ago and was discharged with antibiotics and prednisone. Pt reports increased fatigue and feeling \"unwell\". Pt doesn't believe she was given antibiotics.        EXTERNAL RECORDS REVIEWED  Inpatient Notes ED note 7/28/24 when the patient was evaluated for chest pain.  She was diagnosed with pneumonia and sent home with a prescription for cefdinir.    HPI/ROS  LIMITATION TO HISTORY   Select: : None  OUTSIDE HISTORIAN(S):  None    Lorene Haro is a 48 y.o. female who presents to the emergency department for evaluation of fatigue.  The patient states that she was seen here 2 days ago and diagnosed with a pneumonia.  She states that she has not gotten her antibiotic prescription filled and has only been taking the prednisone.  She admits to generalized chest tightness as well.  She is still coughing.  She denies any hemoptysis.  She denies any fevers.  She does admit to some shortness of breath and lower abdominal pain.  She does have a history of A-fib and is on Eliquis.  He states that she has been taking this.  She states that today she felt generally fatigued and was instructed to come to the emergency room.  She denies any dysuria or hematuria.  She does admit to loose stools but denies any melena or hematochezia.  She does not smoke.    PAST MEDICAL HISTORY   has a past medical history of A-fib ([facility]), MONI (acute kidney injury) ([facility]) (8/9/2023), Asthma, Bipolar 2 disorder ([facility]), Chickenpox, Chronic obstructive pulmonary disease ([facility]), Hypertension, On home oxygen therapy, Other psychological stress, Schizophrenic disorder ([facility]), and Yeast infection of the skin (04/01/2021).    SURGICAL HISTORY   has a past surgical history that includes hysterectomy laparoscopy; colectomy; cholecystectomy; and knee arthroscopy (Left).    FAMILY HISTORY  Family " "History   Problem Relation Age of Onset    No Known Problems Mother     Cancer Sister        SOCIAL HISTORY  Social History     Tobacco Use    Smoking status: Former     Current packs/day: 0.00     Types: Cigarettes     Quit date: 10/12/2022     Years since quittin.8    Smokeless tobacco: Never   Vaping Use    Vaping status: Never Used   Substance and Sexual Activity    Alcohol use: Not Currently    Drug use: Never    Sexual activity: Not Currently       CURRENT MEDICATIONS  Home Medications       Reviewed by Rossana Abraham R.N. (Registered Nurse) on 24 at 1110  Med List Status: Partial     Medication Last Dose Status   ADVAIR DISKUS 100-50 MCG/ACT AEROSOL POWDER, BREATH ACTIVATED  Active   albuterol 108 (90 Base) MCG/ACT Aero Soln inhalation aerosol  Active   apixaban (ELIQUIS) 5mg Tab  Active   aripiprazole (ABILIFY) 30 MG tablet  Active   atorvastatin (LIPITOR) 20 MG Tab  Active   benzonatate (TESSALON) 100 MG Cap  Active   cefdinir (OMNICEF) 300 MG Cap  Active   fluticasone (FLONASE) 50 MCG/ACT nasal spray  Active   guaiFENesin (ROBITUSSIN) 100 MG/5ML liquid  Active   Home Care Oxygen  Active   meclizine (ANTIVERT) 25 MG Tab  Active   metoprolol SR (TOPROL XL) 25 MG TABLET SR 24 HR  Active   montelukast (SINGULAIR) 10 MG Tab  Active   nicotine (NICODERM) 21 MG/24HR PATCH 24 HR  Active                  Audit from Redirected Encounters    **Home medications have not yet been reviewed for this encounter**         ALLERGIES  Allergies   Allergen Reactions    Penicillin G Rash and Itching     pt reports that she gets a rash all over her body and gets itchy    Amoxicillin Rash and Itching     Tolerates cephalosporins, pt reports that she gets a rash all over her body and gets itchy       PHYSICAL EXAM  VITAL SIGNS: /58   Pulse 73   Temp 37.1 °C (98.7 °F) (Temporal)   Resp 17   Ht 1.549 m (5' 1\")   Wt 124 kg (274 lb 4 oz)   LMP  (LMP Unknown)   SpO2 91%   BMI 51.82 kg/m²   Constitutional: " Alert and in no apparent distress.  HENT: Normocephalic atraumatic. Bilateral external ears normal. Nose normal. Mucous membranes are moist.  Eyes: Pupils are equal and reactive. Conjunctiva normal. Non-icteric sclera.   Neck: Normal range of motion without tenderness. Supple.   Cardiovascular: Regular rate and rhythm. No murmurs, gallops or rubs.  Thorax & Lungs: The patient is tachypneic. Breath sounds are clear to auscultation bilaterally. No wheezing, rhonchi or rales.  Abdomen: Soft and nondistended.  There is tenderness to palpation in the left lower quadrant.  Skin: Warm and dry.   Back: No bony tenderness, No CVA tenderness.   Extremities: 2+ peripheral pulses. Cap refill is less than 2 seconds. No edema, cyanosis, or clubbing.  Musculoskeletal: Good range of motion in all major joints. No tenderness to palpation or major deformities noted.   Neurologic: Alert and oriented x 3. The patient moves all 4 extremities without obvious deficits.    EKG/LABS  Results for orders placed or performed during the hospital encounter of 07/30/24   Lactic Acid   Result Value Ref Range    Lactic Acid 1.3 0.5 - 2.0 mmol/L   CBC with Differential   Result Value Ref Range    WBC 10.1 4.8 - 10.8 K/uL    RBC 4.84 4.20 - 5.40 M/uL    Hemoglobin 12.9 12.0 - 16.0 g/dL    Hematocrit 43.5 37.0 - 47.0 %    MCV 89.9 81.4 - 97.8 fL    MCH 26.7 (L) 27.0 - 33.0 pg    MCHC 29.7 (L) 32.2 - 35.5 g/dL    RDW 53.0 (H) 35.9 - 50.0 fL    Platelet Count 231 164 - 446 K/uL    MPV 11.4 9.0 - 12.9 fL    Neutrophils-Polys 73.60 (H) 44.00 - 72.00 %    Lymphocytes 17.50 (L) 22.00 - 41.00 %    Monocytes 7.50 0.00 - 13.40 %    Eosinophils 0.90 0.00 - 6.90 %    Basophils 0.10 0.00 - 1.80 %    Immature Granulocytes 0.40 0.00 - 0.90 %    Nucleated RBC 0.00 0.00 - 0.20 /100 WBC    Neutrophils (Absolute) 7.41 1.82 - 7.42 K/uL    Lymphs (Absolute) 1.76 1.00 - 4.80 K/uL    Monos (Absolute) 0.76 0.00 - 0.85 K/uL    Eos (Absolute) 0.09 0.00 - 0.51 K/uL    Baso  (Absolute) 0.01 0.00 - 0.12 K/uL    Immature Granulocytes (abs) 0.04 0.00 - 0.11 K/uL    NRBC (Absolute) 0.00 K/uL    Anisocytosis 1+     Microcytosis 1+    Complete Metabolic Panel   Result Value Ref Range    Sodium 146 (H) 135 - 145 mmol/L    Potassium 4.2 3.6 - 5.5 mmol/L    Chloride 107 96 - 112 mmol/L    Co2 28 20 - 33 mmol/L    Anion Gap 11.0 7.0 - 16.0    Glucose 79 65 - 99 mg/dL    Bun 11 8 - 22 mg/dL    Creatinine 0.81 0.50 - 1.40 mg/dL    Calcium 9.8 8.5 - 10.5 mg/dL    Correct Calcium 9.8 8.5 - 10.5 mg/dL    AST(SGOT) 11 (L) 12 - 45 U/L    ALT(SGPT) 14 2 - 50 U/L    Alkaline Phosphatase 121 (H) 30 - 99 U/L    Total Bilirubin 1.2 0.1 - 1.5 mg/dL    Albumin 4.0 3.2 - 4.9 g/dL    Total Protein 6.9 6.0 - 8.2 g/dL    Globulin 2.9 1.9 - 3.5 g/dL    A-G Ratio 1.4 g/dL   Urinalysis    Specimen: Urine   Result Value Ref Range    Color Yellow     Character Clear     Specific Gravity 1.023 <1.035    Ph 5.5 5.0 - 8.0    Glucose Negative Negative mg/dL    Ketones Trace (A) Negative mg/dL    Protein 30 (A) Negative mg/dL    Bilirubin Negative Negative    Urobilinogen, Urine 1.0 Negative    Nitrite Negative Negative    Leukocyte Esterase Trace (A) Negative    Occult Blood Negative Negative    Micro Urine Req Microscopic    Blood Culture - Draw one from central line and one from peripheral site    Specimen: Peripheral; Blood   Result Value Ref Range    Significant Indicator NEG     Source BLD     Site PERIPHERAL     Culture Result       No Growth  Note: Blood cultures are incubated for 5 days and  are monitored continuously.Positive blood cultures  are called to the RN and reported as soon as  they are identified.     Blood Culture - Draw one from central line and one from peripheral site    Specimen: Line; Blood   Result Value Ref Range    Significant Indicator NEG     Source BLD     Site Peripheral     Culture Result       No Growth  Note: Blood cultures are incubated for 5 days and  are monitored continuously.Positive  blood cultures  are called to the RN and reported as soon as  they are identified.     Lipase   Result Value Ref Range    Lipase 19 11 - 82 U/L   Procalcitonin   Result Value Ref Range    Procalcitonin 0.06 <0.25 ng/mL   Troponin   Result Value Ref Range    Troponin T 36 (H) 6 - 19 ng/L   Prothrombin Time   Result Value Ref Range    PT 15.1 (H) 12.0 - 14.6 sec    INR 1.17 (H) 0.87 - 1.13   APTT   Result Value Ref Range    APTT 31.5 24.7 - 36.0 sec   URINE MICROSCOPIC (W/UA)   Result Value Ref Range    WBC 0-2 /hpf    RBC 0-2 /hpf    Bacteria Negative None /hpf    Epithelial Cells Few /hpf    Ca Oxalate Crystal Moderate /hpf    Hyaline Cast 3-5 (A) /lpf   ESTIMATED GFR   Result Value Ref Range    GFR (CKD-EPI) 88 >60 mL/min/1.73 m 2   PLATELET ESTIMATE   Result Value Ref Range    Plt Estimation Normal    MORPHOLOGY   Result Value Ref Range    RBC Morphology Present    PERIPHERAL SMEAR REVIEW   Result Value Ref Range    Peripheral Smear Review see below    DIFFERENTIAL COMMENT   Result Value Ref Range    Comments-Diff see below    TROPONIN   Result Value Ref Range    Troponin T 36 (H) 6 - 19 ng/L   EKG   Result Value Ref Range    Report       [facility] Emergency Dept.    Test Date:  2024  Pt Name:    ADELINA MILLAN                Department: ER  MRN:        [id number]                      Room:       [facility]  Gender:     Female                       Technician: [id number]  :        [id number]                   Requested By:MONICA ANN  Order #:    [id number]                    Reading MD: Monica Ann    Measurements  Intervals                                Axis  Rate:       68                           P:          63  AK:         137                          QRS:        48  QRSD:       106                          T:          2  QT:         385  QTc:        410    Interpretive Statements  Sinus rhythm  Low voltage, precordial leads  RSR' in V1 or V2, right VCD or RVH  Compared to ECG 2024  13:06:55  Right ventricular hypertrophy now present    Electronically Signed On 07- 14:22:43 PDT by Monica Prabhakar       *Note: Due to a large number of results and/or encounters for the requested time period, some results have not been displayed. A complete set of results can be found in Results Review.     I have independently interpreted this EKG    RADIOLOGY/PROCEDURES   I have independently interpreted the diagnostic imaging associated with this visit and am waiting the final reading from the radiologist.   My preliminary interpretation is as follows: There appears to be an opacity in the left lower lung field.    Radiologist interpretation:  CT-ABDOMEN-PELVIS WITH   Final Result      1. No acute inflammatory process in the abdomen or pelvis.   2. Absent uterus, right ovary and gallbladder.   3. Stable hepatomegaly.      DX-CHEST-PORTABLE (1 VIEW)   Final Result      Mild left basilar opacity which may be atelectasis versus infiltrate.          COURSE & MEDICAL DECISION MAKING    ASSESSMENT, COURSE AND PLAN  Care Narrative: This is a 48-year-old female presenting to the emergency department for evaluation of fatigue.  On initial evaluation, the patient did not appear to be in any acute distress.  Vital signs are reassuring.  Physical exam was notable for tenderness palpation in the left lower quadrant.  She also had diminished breath sounds bilaterally.  I reviewed her workup from 2 days ago and noted that she had a pneumonia of the left lower lobe.  She has not been taking her antibiotics.    An IV was established and the patient was given a dose of ceftriaxone and azithromycin here.      Patient's white blood cell count was normal and reassuring.  Electrolytes were without significant derangement.  Urinalysis did not have any obvious evidence of infection.      Chest x-ray again demonstrated a mid left basilar opacity.    EKG did not show any evidence of acute ischemia or arrhythmia and troponins x 2 were  36.    CT of the abdomen and pelvis was ordered given her tenderness to palpation on exam.  This did not show any evidence of acute inflammatory process.    While the patient was here she did have a 10 beat run of what appeared to be V. tach.  TSH was added onto the workup and the plan was made to admit for monitoring.    4:17 PM - I discussed the case with Dr Esteban, hospitalist.  He agreed with the plan and accepted the patient.    ADDITIONAL PROBLEMS MANAGED  Fatigue, chest pain, arrhythmia    DISPOSITION AND DISCUSSIONS  I have discussed management of the patient with the following physicians and JAY's:  Dr Esteban, hospitalist    Discussion of management with other \[facility]\"" or appropriate source(s): None     FINAL IMPRESSION  1. Other fatigue    2. Chest pain, unspecified type    3. Cardiac arrhythmia, unspecified cardiac arrhythmia type      -ADMIT-    Electronically signed by: Monica Prabhakar D.O., 7/30/2024 12:32 PM

## 2024-07-30 NOTE — ED NOTES
"Pt had 10 beats of v tach   ERP notified pt asymptomatic  Strip printed and in pts chart       /58   Pulse 73   Temp 37.1 °C (98.7 °F) (Temporal)   Resp 17   Ht 1.549 m (5' 1\")   Wt 124 kg (274 lb 4 oz)   LMP  (LMP Unknown)   SpO2 91%   BMI 51.82 kg/m²         "

## 2024-07-30 NOTE — ED NOTES
Second troponin collected and sent. Pt updated on plan of care, all questions answered at this time

## 2024-07-31 ENCOUNTER — APPOINTMENT (OUTPATIENT)
Dept: SLEEP MEDICINE | Facility: MEDICAL CENTER | Age: 49
End: 2024-07-31
Payer: MEDICAID

## 2024-07-31 ENCOUNTER — APPOINTMENT (OUTPATIENT)
Dept: CARDIOLOGY | Facility: MEDICAL CENTER | Age: 49
End: 2024-07-31
Attending: INTERNAL MEDICINE
Payer: MEDICAID

## 2024-07-31 ENCOUNTER — PHARMACY VISIT (OUTPATIENT)
Dept: PHARMACY | Facility: MEDICAL CENTER | Age: 49
End: 2024-07-31
Payer: COMMERCIAL

## 2024-07-31 VITALS
HEIGHT: 61 IN | OXYGEN SATURATION: 95 % | SYSTOLIC BLOOD PRESSURE: 113 MMHG | RESPIRATION RATE: 16 BRPM | HEART RATE: 65 BPM | TEMPERATURE: 97.3 F | WEIGHT: 272.71 LBS | DIASTOLIC BLOOD PRESSURE: 57 MMHG | BODY MASS INDEX: 51.49 KG/M2

## 2024-07-31 LAB
ANION GAP SERPL CALC-SCNC: 9 MMOL/L (ref 7–16)
B PARAP IS1001 DNA NPH QL NAA+NON-PROBE: NOT DETECTED
B PERT.PT PRMT NPH QL NAA+NON-PROBE: NOT DETECTED
BACTERIA UR CULT: NORMAL
BUN SERPL-MCNC: 10 MG/DL (ref 8–22)
C PNEUM DNA NPH QL NAA+NON-PROBE: NOT DETECTED
CALCIUM SERPL-MCNC: 9.2 MG/DL (ref 8.5–10.5)
CHLORIDE SERPL-SCNC: 107 MMOL/L (ref 96–112)
CHOLEST SERPL-MCNC: 108 MG/DL (ref 100–199)
CO2 SERPL-SCNC: 26 MMOL/L (ref 20–33)
CREAT SERPL-MCNC: 0.86 MG/DL (ref 0.5–1.4)
ERYTHROCYTE [DISTWIDTH] IN BLOOD BY AUTOMATED COUNT: 53.1 FL (ref 35.9–50)
FLUAV RNA NPH QL NAA+NON-PROBE: NOT DETECTED
FLUBV RNA NPH QL NAA+NON-PROBE: NOT DETECTED
GFR SERPLBLD CREATININE-BSD FMLA CKD-EPI: 82 ML/MIN/1.73 M 2
GLUCOSE SERPL-MCNC: 113 MG/DL (ref 65–99)
HADV DNA NPH QL NAA+NON-PROBE: NOT DETECTED
HCOV 229E RNA NPH QL NAA+NON-PROBE: NOT DETECTED
HCOV HKU1 RNA NPH QL NAA+NON-PROBE: NOT DETECTED
HCOV NL63 RNA NPH QL NAA+NON-PROBE: NOT DETECTED
HCOV OC43 RNA NPH QL NAA+NON-PROBE: NOT DETECTED
HCT VFR BLD AUTO: 39.9 % (ref 37–47)
HDLC SERPL-MCNC: 43 MG/DL
HGB BLD-MCNC: 12.2 G/DL (ref 12–16)
HMPV RNA NPH QL NAA+NON-PROBE: NOT DETECTED
HPIV1 RNA NPH QL NAA+NON-PROBE: NOT DETECTED
HPIV2 RNA NPH QL NAA+NON-PROBE: NOT DETECTED
HPIV3 RNA NPH QL NAA+NON-PROBE: NOT DETECTED
HPIV4 RNA NPH QL NAA+NON-PROBE: NOT DETECTED
LDLC SERPL CALC-MCNC: 43 MG/DL
LV EJECT FRACT  99904: 59
LV EJECT FRACT MOD 2C 99903: 56.94
LV EJECT FRACT MOD 4C 99902: 61.86
LV EJECT FRACT MOD BP 99901: 59.65
M PNEUMO DNA NPH QL NAA+NON-PROBE: NOT DETECTED
MCH RBC QN AUTO: 27.2 PG (ref 27–33)
MCHC RBC AUTO-ENTMCNC: 30.6 G/DL (ref 32.2–35.5)
MCV RBC AUTO: 89.1 FL (ref 81.4–97.8)
PLATELET # BLD AUTO: 200 K/UL (ref 164–446)
PMV BLD AUTO: 11.5 FL (ref 9–12.9)
POTASSIUM SERPL-SCNC: 4.3 MMOL/L (ref 3.6–5.5)
RBC # BLD AUTO: 4.48 M/UL (ref 4.2–5.4)
RSV RNA NPH QL NAA+NON-PROBE: NOT DETECTED
RV+EV RNA NPH QL NAA+NON-PROBE: NOT DETECTED
SARS-COV-2 RNA NPH QL NAA+NON-PROBE: NOTDETECTED
SIGNIFICANT IND 70042: NORMAL
SITE SITE: NORMAL
SODIUM SERPL-SCNC: 142 MMOL/L (ref 135–145)
SOURCE SOURCE: NORMAL
TRIGL SERPL-MCNC: 109 MG/DL (ref 0–149)
WBC # BLD AUTO: 8.7 K/UL (ref 4.8–10.8)

## 2024-07-31 PROCEDURE — 0202U NFCT DS 22 TRGT SARS-COV-2: CPT

## 2024-07-31 PROCEDURE — A9270 NON-COVERED ITEM OR SERVICE: HCPCS | Mod: UD | Performed by: INTERNAL MEDICINE

## 2024-07-31 PROCEDURE — RXMED WILLOW AMBULATORY MEDICATION CHARGE: Performed by: INTERNAL MEDICINE

## 2024-07-31 PROCEDURE — 80048 BASIC METABOLIC PNL TOTAL CA: CPT

## 2024-07-31 PROCEDURE — 94664 DEMO&/EVAL PT USE INHALER: CPT

## 2024-07-31 PROCEDURE — A9270 NON-COVERED ITEM OR SERVICE: HCPCS | Mod: UD

## 2024-07-31 PROCEDURE — 700102 HCHG RX REV CODE 250 W/ 637 OVERRIDE(OP): Mod: UD

## 2024-07-31 PROCEDURE — 93308 TTE F-UP OR LMTD: CPT

## 2024-07-31 PROCEDURE — 700102 HCHG RX REV CODE 250 W/ 637 OVERRIDE(OP): Mod: UD | Performed by: INTERNAL MEDICINE

## 2024-07-31 PROCEDURE — 36415 COLL VENOUS BLD VENIPUNCTURE: CPT

## 2024-07-31 PROCEDURE — G0378 HOSPITAL OBSERVATION PER HR: HCPCS

## 2024-07-31 PROCEDURE — 700117 HCHG RX CONTRAST REV CODE 255: Mod: UD | Performed by: INTERNAL MEDICINE

## 2024-07-31 PROCEDURE — 85027 COMPLETE CBC AUTOMATED: CPT

## 2024-07-31 PROCEDURE — 94640 AIRWAY INHALATION TREATMENT: CPT

## 2024-07-31 PROCEDURE — 93308 TTE F-UP OR LMTD: CPT | Mod: 26 | Performed by: INTERNAL MEDICINE

## 2024-07-31 PROCEDURE — 99239 HOSP IP/OBS DSCHRG MGMT >30: CPT | Performed by: INTERNAL MEDICINE

## 2024-07-31 PROCEDURE — 80061 LIPID PANEL: CPT

## 2024-07-31 RX ORDER — ONDANSETRON 4 MG/1
4 TABLET, ORALLY DISINTEGRATING ORAL EVERY 6 HOURS PRN
Status: ON HOLD | COMMUNITY
End: 2024-08-20

## 2024-07-31 RX ORDER — PREDNISONE 20 MG/1
40 TABLET ORAL DAILY
Status: ON HOLD | COMMUNITY
Start: 2024-07-15 | End: 2024-08-10

## 2024-07-31 RX ORDER — FAMOTIDINE 20 MG/1
20 TABLET, FILM COATED ORAL 2 TIMES DAILY PRN
Status: ON HOLD | COMMUNITY
End: 2024-08-10

## 2024-07-31 RX ORDER — METHOCARBAMOL 750 MG/1
750 TABLET, FILM COATED ORAL 3 TIMES DAILY PRN
Status: ON HOLD | COMMUNITY
End: 2024-08-20

## 2024-07-31 RX ORDER — IBUPROFEN 800 MG/1
800 TABLET, FILM COATED ORAL EVERY 6 HOURS PRN
Status: ON HOLD | COMMUNITY
End: 2024-08-10

## 2024-07-31 RX ORDER — DICYCLOMINE HCL 20 MG
20 TABLET ORAL EVERY 6 HOURS PRN
Status: ON HOLD | COMMUNITY
End: 2024-08-20

## 2024-07-31 RX ORDER — FUROSEMIDE 20 MG
20 TABLET ORAL
Status: ON HOLD | COMMUNITY
End: 2024-08-20

## 2024-07-31 RX ORDER — INDOMETHACIN 25 MG/1
25 CAPSULE ORAL 2 TIMES DAILY WITH MEALS
Qty: 6 CAPSULE | Refills: 0 | Status: SHIPPED | OUTPATIENT
Start: 2024-07-31 | End: 2024-08-03

## 2024-07-31 RX ORDER — ACETAMINOPHEN 500 MG
500-1000 TABLET ORAL EVERY 6 HOURS PRN
Status: ON HOLD | COMMUNITY
End: 2024-08-20

## 2024-07-31 RX ORDER — GABAPENTIN 300 MG/1
300 CAPSULE ORAL 3 TIMES DAILY
COMMUNITY

## 2024-07-31 RX ORDER — ALBUTEROL SULFATE 90 UG/1
AEROSOL, METERED RESPIRATORY (INHALATION)
Status: COMPLETED
Start: 2024-07-31 | End: 2024-07-31

## 2024-07-31 RX ORDER — NICOTINE 21 MG/24HR
1 PATCH, TRANSDERMAL 24 HOURS TRANSDERMAL EVERY 24 HOURS
COMMUNITY

## 2024-07-31 RX ORDER — ALBUTEROL SULFATE 90 UG/1
2 AEROSOL, METERED RESPIRATORY (INHALATION)
Status: DISCONTINUED | OUTPATIENT
Start: 2024-07-31 | End: 2024-07-31 | Stop reason: HOSPADM

## 2024-07-31 RX ORDER — TRAMADOL HYDROCHLORIDE 50 MG/1
50 TABLET ORAL EVERY 6 HOURS PRN
Status: ON HOLD | COMMUNITY
End: 2024-08-20

## 2024-07-31 RX ORDER — INDOMETHACIN 25 MG/1
25 CAPSULE ORAL 2 TIMES DAILY WITH MEALS
Status: DISCONTINUED | OUTPATIENT
Start: 2024-07-31 | End: 2024-07-31 | Stop reason: HOSPADM

## 2024-07-31 RX ADMIN — APIXABAN 5 MG: 5 TABLET, FILM COATED ORAL at 05:11

## 2024-07-31 RX ADMIN — MOMETASONE FUROATE AND FORMOTEROL FUMARATE DIHYDRATE 2 PUFF: 100; 5 AEROSOL RESPIRATORY (INHALATION) at 05:12

## 2024-07-31 RX ADMIN — NICOTINE TRANSDERMAL SYSTEM 21 MG: 21 PATCH, EXTENDED RELEASE TRANSDERMAL at 05:11

## 2024-07-31 RX ADMIN — ALBUTEROL SULFATE: 90 AEROSOL, METERED RESPIRATORY (INHALATION) at 11:48

## 2024-07-31 RX ADMIN — METOPROLOL SUCCINATE 25 MG: 25 TABLET, EXTENDED RELEASE ORAL at 05:11

## 2024-07-31 RX ADMIN — ALBUTEROL SULFATE 2 PUFF: 90 AEROSOL, METERED RESPIRATORY (INHALATION) at 11:30

## 2024-07-31 RX ADMIN — INDOMETHACIN 25 MG: 25 CAPSULE ORAL at 11:13

## 2024-07-31 RX ADMIN — HUMAN ALBUMIN MICROSPHERES AND PERFLUTREN 3 ML: 10; .22 INJECTION, SOLUTION INTRAVENOUS at 11:15

## 2024-07-31 RX ADMIN — ARIPIPRAZOLE 30 MG: 15 TABLET ORAL at 05:11

## 2024-07-31 NOTE — DISCHARGE INSTRUCTIONS
FOLLOW UP ITEMS POST DISCHARGE  -Continue indomethacin for 3 days.  -Complete course of antibiotics for pneumonia.  -Follow-up with PCP  - counseled to seek immediate medical attention, or return to the ED for recurrent or worsening symptoms.

## 2024-07-31 NOTE — CARE PLAN
The patient is Stable - Low risk of patient condition declining or worsening    Shift Goals  Clinical Goals: tele monitoring, saftey  Patient Goals: rest  Family Goals: ARAM    Progress made toward(s) clinical / shift goals:    Problem: Knowledge Deficit - Standard  Goal: Patient and family/care givers will demonstrate understanding of plan of care, disease process/condition, diagnostic tests and medications  Description: Target End Date:  1-3 days or as soon as patient condition allows    Document in Patient Education    1.  Patient and family/caregiver oriented to unit, equipment, visitation policy and means for communicating concern  2.  Complete/review Learning Assessment  3.  Assess knowledge level of disease process/condition, treatment plan, diagnostic tests and medications  4.  Explain disease process/condition, treatment plan, diagnostic tests and medications  Outcome: Progressing     Problem: Pain - Standard  Goal: Alleviation of pain or a reduction in pain to the patient’s comfort goal  Description: Target End Date:  Prior to discharge or change in level of care    Document on Vitals flowsheet    1.  Document pain using the appropriate pain scale per order or unit policy  2.  Educate and implement non-pharmacologic comfort measures (i.e. relaxation, distraction, massage, cold/heat therapy, etc.)  3.  Pain management medications as ordered  4.  Reassess pain after pain med administration per policy  5.  If opiods administered assess patient's response to pain medication is appropriate per POSS sedation scale  6.  Follow pain management plan developed in collaboration with patient and interdisciplinary team (including palliative care or pain specialists if applicable)  Outcome: Progressing       Patient is not progressing towards the following goals:

## 2024-07-31 NOTE — DISCHARGE PLANNING
Case Management Discharge Planning    Admission Date: 7/30/2024  GMLOS:    ALOS: 0    6-Clicks ADL Score:    6-Clicks Mobility Score:        Anticipated Discharge Dispo: Discharge Disposition: D/T to facility providing shelter/supportive care (04)    DME Needed: No    Action(s) Taken: Updated Provider/Nurse on Discharge Plan    Pt was discussed in IDT rounds with Dr Gonzales.   Plan is to discharge Pt back to [facility] today.   Pt is medically cleared.     This RN CM met with Pt at bedside.  Pt states the exact address of the .    Tried to call Pt s Sister Kylah Stevens answered who said they do not have a car to transport Pt to the .    This RN CM left multiple  VMs to [facility] the  , Owner to make sure that someone will receive Pt at the , awaiting return call .  Informed Dr Gonzales  and JC Valenzuela.    This RN CM provided Pt a taxi voucher as requested.    Pt is on 2 liters oxygen at  night only per JC Valenzuela.    13:12 This RN CM spoke with Karen at the [facility] , there are caregivers to receive Pt at the . Informed JC Valenzuela.     Escalations Completed: None    Medically Clear: Yes    Next Steps:   CM to continue to assist Pt with discharge as needed    Barriers to Discharge: None    Is the patient up for discharge tomorrow: No

## 2024-07-31 NOTE — PROGRESS NOTES
4 Eyes Skin Assessment Completed by JC Hill and JC Dennis.    Head WDL  Ears WDL  Nose WDL dry skin  Mouth WDL dry skin  Neck WDL  Breast/Chest WDL  Shoulder Blades WDL  Spine WDL  (R) Arm/Elbow/Hand WDL  (L) Arm/Elbow/Hand WDL  Abdomen WDL  Groin WDL  Scrotum/Coccyx/Buttocks WDL  (R) Leg WDL  (L) Leg WDL  (R) Heel/Foot/Toe WDL  (L) Heel/Foot/Toe WDL          Devices In Places Tele Box, Pulse Ox, and Nasal Cannula      Interventions In Place Gray Ear Foams and Pillows    Possible Skin Injury No    Pictures Uploaded Into Epic N/A  Wound Consult Placed N/A  RN Wound Prevention Protocol Ordered No

## 2024-07-31 NOTE — ED NOTES
Attempted to call report to receiving RN JC Romero unable to take report related to giving pt care, extension given for them to call when ready.

## 2024-07-31 NOTE — ASSESSMENT & PLAN NOTE
Continue RT protocol and inhalers  Recommend outpatient bariatric surgery and pulmonology evaluation

## 2024-07-31 NOTE — PROGRESS NOTES
Lorene Haro has been provided discharge instructions, to include follow up care, home medications, and activity/diet reviewed. Understanding verbalized. IV removed. Catheter tip intact, bleeding controlled. Arm band removed. Medications given from pharmacy. Pt with cab voucher, will call to arrange ride. Pt out of DC at this time with personal belongings.

## 2024-07-31 NOTE — ED NOTES
Pt ambulated independently utilizing four wheel walker with steady gait to restroom with ED tech

## 2024-07-31 NOTE — ASSESSMENT & PLAN NOTE
Patient has been started on IV ceftriaxone and azithromycin  Check pro calcitonin, if within normal limits we'll consider de-escalating antibiotics  RT protocol  Follow blood cultures

## 2024-07-31 NOTE — ASSESSMENT & PLAN NOTE
Body mass index is 51.82 kg/m².  Recommended outpatient weight management program and bariatric surgery evaluation

## 2024-07-31 NOTE — RESPIRATORY CARE
COPD EDUCATION by COPD CLINICAL EDUCATOR  7/31/2024  at  12:48 PM by Maricarmen Manzano, RRT     Patient interviewed by education team.  Patient declined or is unable to participate in the full program.  Therefore, a short intervention has been conducted.  A comprehensive packet including information about COPD, types of treatments to manage their disease and safe home Oxygen usage was provided and reviewed with patient at the bedside.      Spacer education and training while proving 2 puffs of Albuterol per request of Patient. Spacer return demonstration is acceptable.    Patient endorses wearing home O2 at [facility] only. Recent sleep study and ones prior, indicate patient should be wearing IVAPS at home for severe KATHIA and hypoventilation syndrome. I have encouraged patient to get this equipment and wear. Patient also had a repeat Pulmonary Function Test the was scheduled for 5/24/24. Per patient request, I have called scheduling to help this patient re-schedule the PFT.   Patient endorses quitting smoking 5 days ago and is currently using Nicotine replacement therapy via the patch. Patient poletly declined a referral for Smoking Cessation.        COPD Screen  COPD Risk Screening  Do you have a history of COPD?: Yes  Do you have a Pulmonologist?: Yes    COPD Assessment  COPD Clinical Specialists ONLY  COPD Education Initiated: Yes--Short Intervention (spacer teaching, COPD booklet, Action Plan, reschedule PFT)  Is this a COPD exacerbation patient?: No  DME Company: Preferred  DME Equipment Type: O2 at 2 lpm NOC  Physician Name: no PCP  Pulmonologist Name: [facility]  $ Demo/Eval of SVN's, MDI's and Aerosols: Yes (spacer)  (OP) Pulmonary Function Testing: Yes (9/21 FEV1.45 46%, FEV1/FVC 86- restriction, possbily mixed BM1 51.53)  Interdisciplinary Rounds: Attendance at Rounds (30 Min)    PFT Results    9/21/21  FEV1 1.24 48%, FVC 1.45 46%, FEV1/FVC 86% Pt BMI 51.53, restriction with mild obstruction.    Meds to  "[facility] provides bedside medication delivery for all eligible patients at discharge.  Would you like to opt out of this program for any reason?: No - Stay Opted In     MY COPD ACTION PLAN     It is recommended that patients and physicians /healthcare providers complete this action plan together. This plan should be discussed at each physician visit and updated as needed.    The green, yellow and red zones show groups of symptoms of COPD. This list of symptoms is not comprehensive, and you may experience other symptoms. In the \"Actions\" column, your healthcare provider has recommended actions for you to take based on your symptoms.    Patient Name: Lorene Haro   YOB: 1975   Last Updated on: 7/31/2024 12:47 PM   Green Zone:  I am doing well today Actions     Usual activitiy and exercise level   Take daily medications     Usual amounts of cough and phlegm/mucus   Use oxygen as prescribed     Sleep well at night   Continue regular exercise/diet plan     Appetite is good   At all times avoid cigarette smoke, inhaled irritants     Daily Medications (these medications are taken every day):   Fluticosone/Salmeterol (Advair) 1 Puff Twice daily     Additional Information:  Rinse mouth and spit after using Advair    Yellow Zone:  I am having a bad day or a COPD flare Actions     More breathless than usual   Continue daily medications     I have less energy for my daily activities   Use quick relief inhaler as ordered     Increased or thicker phlegm/mucus   Use oxygen as prescribed     Using quick relief inhaler/nebulizer more often   Get plenty of rest     Swelling of ankles more than usual   Use pursed lip breathing     More coughing than usual   At all times avoid cigarette smoke, inhaled irritants     I feel like I have a \"chest cold\"     Poor sleep and my symptoms woke me up     My appetite is not good     My medicine is not helping      Call provider immediately if symptoms don’t improve "     Continue daily medications, add rescue medications:   Albuterol  Albuterol 2 Puffs  3mL via nebulizer Every 6 hours PRN         Medications to be used during a flare up, (as Discussed with Provider):           Additional Information:  Use spacer with rescue inhaler  use nebulizer as directed by your Doctor    Red Zone:  I need urgent medical care Actions     Severe shortness of breath even at rest   Call 911 or seek medical care immediately     Not able to do any activity because of breathing      Fever or shaking chills      Feeling confused or very drowsy       Chest pains      Coughing up blood

## 2024-07-31 NOTE — ASSESSMENT & PLAN NOTE
The 10-year ASCVD risk score (Ken BYRNES, et al., 2019) is: 0.8%    Values used to calculate the score:      Age: 48 years      Sex: Female      Is Non- : No      Diabetic: No      Tobacco smoker: No      Systolic Blood Pressure: 123 mmHg      Is BP treated: No      HDL Cholesterol: 44 mg/dL      Total Cholesterol: 130 mg/dL    Likely due to pneumonia however will rule out ACS  Continuous cardiac monitoring with serial EKG and troponin  Check 2D echo  Check lipid panel, TSH and hemoglobin A1c  Nitro when necessary for chest pain

## 2024-07-31 NOTE — ED NOTES
Med Rec complete per MAR from [facility] ([phone])  Allergies reviewed  Antibiotics in the past 30 days:yes  Anticoagulant in past 14 days:yes  Anticoagulant:Eliquis 5 mg Last dose:07/30/24  Pharmacy patient utilizes:[facility]     from [facility] unable to verify how long Cefdinir course is supposed to be, stated manager was out of office.

## 2024-07-31 NOTE — PROGRESS NOTES
Pt arrived to unit via gurney. Pt oriented to room, unit, and plan of care. Tele-monitor placed and monitor room notified. All questions answered at this time. Call light within reach; fall precautions in place.

## 2024-07-31 NOTE — DISCHARGE SUMMARY
"Discharge Summary    CHIEF COMPLAINT ON ADMISSION  Chief Complaint   Patient presents with    Fatigue     Pt BIBA from [facility] for fatigue. Pt was diagnosed with PNA a few days ago and was discharged with antibiotics and prednisone. Pt reports increased fatigue and feeling \"unwell\". Pt doesn't believe she was given antibiotics.        Reason for Admission  Follow Up     Admission Date  7/30/2024    CODE STATUS  Full Code    HPI & HOSPITAL COURSE  Lorene Haro is a 48 y.o. female with obesity, chronic respiratory failure on 2L oxygen at night, COPD, atrial fibrillation on eliquis, recent diagnosis of pneumonia and was sent home on antibiotics, who presented 7/30/2024 with chest pain and fatigue.  She described the pain as diffuse with no radiation, with associated shortness of breath and fatigue.  Patient states that she continued feeling poorly and decided to return to the ER. She denies fevers but reports some chills. She denies nausea,vomiting and diarrhea.  On evaluation, WBC count was normal.  Sodium 146.  Creatinine was normal.  Troponins were flat x 3.  EKG showed no dynamic ischemic changes.  Chest x-ray showed mild left basilar opacity.  CT of the abdomen showed no acute inflammatory process.  Patient was given antibiotics.  Further workup was pursued. Chart review showed she had a negative nuclear cardiac stress test within the last 11 months.  Troponins remain flat.  Procalcitonin was low x 2.  Blood cultures remain negative.  Echocardiogram was obtained which showed EF of 59%, normal regional wall motion, and unchanged findings from 5/21/2024.  She maintained sinus rhythm on telemetry.    Her chest pain was felt to be musculoskeletal as she had reproducible chest tenderness on exam.  She is started on indomethacin.  Otherwise she remained hemodynamically stable and afebrile, with stable respiratory status on baseline 2 L of oxygen by nasal cannula.     I have personally seen and examined the " patient on the day of discharge. With her clinical improvement, she was deemed ready to discharge from the hospital as she did not have any further hospitalization needs. Patient felt comfortable going home. The discharge plan was discussed with the patient, with which she was agreeable to.     Therefore, she is discharged in good and stable condition to home with close outpatient follow-up.      Discharge Date  7/31/2024      FOLLOW UP ITEMS POST DISCHARGE  -Continue indomethacin for 3 days.  -Complete course of antibiotics for pneumonia.  -Follow-up with PCP  - counseled to seek immediate medical attention, or return to the ED for recurrent or worsening symptoms.      DISCHARGE DIAGNOSES  Principal Problem:    Chest pain, likely musculoskeletal (POA: Yes)  Active Problems:    Pneumonia (POA: Yes)    KATHIA (obstructive sleep apnea) (POA: Yes)    Restrictive lung disease secondary to obesity (POA: Yes)    Class 3 severe obesity in adult (HCC) (POA: Yes)    Chronic respiratory failure with hypoxia (HCC) (POA: Yes)    Advance care planning (POA: Yes)  Resolved Problems:    * No resolved hospital problems. *      FOLLOW UP  Future Appointments   Date Time Provider Department Center   10/30/2024 12:00 PM PFT-RM3 [facility] None   11/7/2024 10:30 AM [facility] None   11/20/2024  2:00 PM [facility] EXAM 4 [facility] None   12/2/2024  3:40 PM [facility] None   12/6/2024  4:00 PM [facility] None     No follow-up provider specified.    MEDICATIONS ON DISCHARGE     Medication List        START taking these medications        Instructions   indomethacin 25 MG Caps  Commonly known as: Indocin   Take 1 Capsule by mouth 2 times a day with meals for 3 days.  Dose: 25 mg            CHANGE how you take these medications        Instructions   montelukast 10 MG Tabs  What changed: when to take this  Commonly known as: Singulair   Doctor's comments: 3 month supply x 1 year  Take 1 Tablet by  mouth every evening.  Dose: 10 mg            CONTINUE taking these medications        Instructions   acetaminophen 500 MG Tabs  Commonly known as: Tylenol   Take 500-1,000 mg by mouth every 6 hours as needed for Moderate Pain.  Dose: 500-1,000 mg     Advair Diskus 100-50 MCG/ACT Aepb  Generic drug: fluticasone-salmeterol   Doctor's comments: 3 month supply x 1 year  Inhale 1 Puff 2 times a day.  Dose: 1 Puff     albuterol 108 (90 Base) MCG/ACT Aers inhalation aerosol   Inhale 2 Puffs every 6 hours as needed for Shortness of Breath.  Dose: 2 Puff     apixaban 5mg Tabs  Commonly known as: Eliquis   Take 1 Tablet by mouth 2 times a day.  Dose: 5 mg     aripiprazole 30 MG tablet  Commonly known as: Abilify   Take 1 Tablet by mouth every morning. Indications: Major Depressive Disorder  Dose: 30 mg     atorvastatin 20 MG Tabs  Commonly known as: Lipitor   Take 1 Tablet by mouth at bedtime.  Dose: 20 mg     benzonatate 100 MG Caps  Commonly known as: Tessalon   Take 1 Capsule by mouth 3 times a day as needed for Cough.  Dose: 100 mg     cefdinir 300 MG Caps  Commonly known as: Omnicef   Take 1 Capsule by mouth 2 times a day.  Dose: 300 mg     dicyclomine 20 MG Tabs  Commonly known as: Bentyl   Take 20 mg by mouth every 6 hours as needed (abdominal pain or diarrhea).  Dose: 20 mg     famotidine 20 MG Tabs  Commonly known as: Pepcid   Take 20 mg by mouth 2 times a day as needed (for pain).  Dose: 20 mg     fluticasone 50 MCG/ACT nasal spray  Commonly known as: Flonase   Administer 2 Sprays into each nostril every morning.  Dose: 2 Spray     furosemide 20 MG Tabs  Commonly known as: Lasix   Take 20 mg by mouth 1 time a day as needed (swelling).  Dose: 20 mg     gabapentin 300 MG Caps  Commonly known as: Neurontin   Take 300 mg by mouth 3 times a day.  Dose: 300 mg     ibuprofen 800 MG Tabs  Commonly known as: Motrin   Take 800 mg by mouth every 6 hours as needed for Moderate Pain or Mild Pain.  Dose: 800 mg     meclizine 25  MG Tabs  Commonly known as: Antivert   Take 1 Tablet by mouth 3 times a day as needed for Vertigo.  Dose: 25 mg     methocarbamol 750 MG Tabs  Commonly known as: Robaxin   Take 750 mg by mouth 3 times a day as needed (muscle spasms).  Dose: 750 mg     metoprolol SR 25 MG Tb24  Commonly known as: Toprol XL   Take 25 mg by mouth every day.  Dose: 25 mg     nicotine 7 MG/24HR Pt24  Commonly known as: Nicoderm   Place 1 Patch on the skin every 24 hours.  Dose: 1 Patch     ondansetron 4 MG Tbdp  Commonly known as: Zofran ODT   Take 4 mg by mouth every 6 hours as needed for Nausea/Vomiting.  Dose: 4 mg     predniSONE 20 MG Tabs  Commonly known as: Deltasone   Take 40 mg by mouth every day. X 6 days  40 mg = 2 tabs  Dose: 40 mg     traMADol 50 MG Tabs  Commonly known as: Ultram   Take 50 mg by mouth every 6 hours as needed for Moderate Pain.  Dose: 50 mg              Allergies  Allergies   Allergen Reactions    Penicillin G Rash and Itching     pt reports that she gets a rash all over her body and gets itchy    Amoxicillin Rash and Itching     Tolerates cephalosporins, pt reports that she gets a rash all over her body and gets itchy       DIET  Orders Placed This Encounter   Procedures    Diet Order Diet: Cardiac; Miscellaneous modifications: (optional): No Decaf, No Caffeine(for test)     Standing Status:   Standing     Number of Occurrences:   1     Order Specific Question:   Diet:     Answer:   Cardiac [6]     Order Specific Question:   Miscellaneous modifications: (optional)     Answer:   No Decaf, No Caffeine(for test) [11]       ACTIVITY  As tolerated.  Weight bearing as tolerated    CONSULTATIONS  None    PROCEDURES  None    LABORATORY  Lab Results   Component Value Date    SODIUM 142 07/31/2024    POTASSIUM 4.3 07/31/2024    CHLORIDE 107 07/31/2024    CO2 26 07/31/2024    GLUCOSE 113 (H) 07/31/2024    BUN 10 07/31/2024    CREATININE 0.86 07/31/2024    CREATININE 0.9 09/11/2008        Lab Results   Component Value  Date    WBC 8.7 07/31/2024    HEMOGLOBIN 12.2 07/31/2024    HEMATOCRIT 39.9 07/31/2024    PLATELETCT 200 07/31/2024        Total time of the discharge process = 39 minutes.

## 2024-07-31 NOTE — PROGRESS NOTES
Monitor Summary:  Rhythm: SB/SR  Rate: 55-67  Ectopy: rPAC    0.20/0.08/0.41    Per monitor tech interpretation

## 2024-07-31 NOTE — ASSESSMENT & PLAN NOTE
I discussed advance care planning for at least 16 minutes with the patient, including her diagnosis of morbid obesity, restrictive lung disease, COPD, atrial fibrillation, schizophrenia, respiratory failure.  We discussed the prognosis, plan of care, risks and benefits of any therapies that could be offered, as well as alternatives including palliation. The patient has opted to remain full code at this time and would like chest compressions and intubation if necessary. Time spent is exclusive of evaluation and management or other separately billable procedures.

## 2024-07-31 NOTE — PROGRESS NOTES
Assessment completed.  Pt A&Ox4.  Pt denies any CP, SOB, nausea, palpitations at this time. Sinus rhythm noted on telemetry monitor. POC discussed: awaiting echocardiogram; communication board updated.    Bed in locked, lowest position.  Call light and belongings within reach.  Needs met.

## 2024-08-01 LAB
BACTERIA UR CULT: NORMAL
SIGNIFICANT IND 70042: NORMAL
SITE SITE: NORMAL
SOURCE SOURCE: NORMAL

## 2024-08-01 NOTE — ED NOTES
Pt medicated for pain. Resting on gurney with no other needs at this time. Aware of POC. Call light within reach.    yes

## 2024-08-02 ENCOUNTER — NON-PROVIDER VISIT (OUTPATIENT)
Dept: CARDIOLOGY | Facility: MEDICAL CENTER | Age: 49
End: 2024-08-02
Payer: MEDICAID

## 2024-08-02 LAB
BACTERIA BLD CULT: NORMAL
SIGNIFICANT IND 70042: NORMAL
SITE SITE: NORMAL
SOURCE SOURCE: NORMAL

## 2024-08-05 PROCEDURE — 93298 REM INTERROG DEV EVAL SCRMS: CPT | Mod: 26 | Performed by: INTERNAL MEDICINE

## 2024-08-05 NOTE — CARDIAC REMOTE MONITOR - SCAN
Device transmission reviewed. Device demonstrated appropriate function.       Electronically Signed by: Rodney Alberto M.D.    8/6/2024  2:57 PM

## 2024-08-09 ENCOUNTER — HOSPITAL ENCOUNTER (OUTPATIENT)
Facility: MEDICAL CENTER | Age: 49
End: 2024-08-11
Attending: STUDENT IN AN ORGANIZED HEALTH CARE EDUCATION/TRAINING PROGRAM
Payer: MEDICAID

## 2024-08-09 ENCOUNTER — APPOINTMENT (OUTPATIENT)
Dept: RADIOLOGY | Facility: MEDICAL CENTER | Age: 49
End: 2024-08-09
Attending: STUDENT IN AN ORGANIZED HEALTH CARE EDUCATION/TRAINING PROGRAM
Payer: MEDICAID

## 2024-08-09 DIAGNOSIS — H81.13 BENIGN PAROXYSMAL POSITIONAL VERTIGO DUE TO BILATERAL VESTIBULAR DISORDER: ICD-10-CM

## 2024-08-09 DIAGNOSIS — R79.89 ELEVATED TROPONIN: ICD-10-CM

## 2024-08-09 DIAGNOSIS — R07.9 CHEST PAIN, UNSPECIFIED TYPE: ICD-10-CM

## 2024-08-09 LAB
ALBUMIN SERPL BCP-MCNC: 3.8 G/DL (ref 3.2–4.9)
ALBUMIN/GLOB SERPL: 1.3 G/DL
ALP SERPL-CCNC: 137 U/L (ref 30–99)
ALT SERPL-CCNC: 13 U/L (ref 2–50)
ANION GAP SERPL CALC-SCNC: 14 MMOL/L (ref 7–16)
AST SERPL-CCNC: 16 U/L (ref 12–45)
BASOPHILS # BLD AUTO: 0.3 % (ref 0–1.8)
BASOPHILS # BLD: 0.03 K/UL (ref 0–0.12)
BILIRUB SERPL-MCNC: 0.7 MG/DL (ref 0.1–1.5)
BUN SERPL-MCNC: 14 MG/DL (ref 8–22)
CALCIUM ALBUM COR SERPL-MCNC: 9.6 MG/DL (ref 8.5–10.5)
CALCIUM SERPL-MCNC: 9.4 MG/DL (ref 8.5–10.5)
CHLORIDE SERPL-SCNC: 103 MMOL/L (ref 96–112)
CO2 SERPL-SCNC: 24 MMOL/L (ref 20–33)
CREAT SERPL-MCNC: 0.97 MG/DL (ref 0.5–1.4)
EKG IMPRESSION: NORMAL
EOSINOPHIL # BLD AUTO: 0.11 K/UL (ref 0–0.51)
EOSINOPHIL NFR BLD: 1 % (ref 0–6.9)
ERYTHROCYTE [DISTWIDTH] IN BLOOD BY AUTOMATED COUNT: 53.9 FL (ref 35.9–50)
GFR SERPLBLD CREATININE-BSD FMLA CKD-EPI: 71 ML/MIN/1.73 M 2
GLOBULIN SER CALC-MCNC: 3 G/DL (ref 1.9–3.5)
GLUCOSE SERPL-MCNC: 164 MG/DL (ref 65–99)
HCG SERPL QL: NEGATIVE
HCT VFR BLD AUTO: 41.8 % (ref 37–47)
HGB BLD-MCNC: 12.9 G/DL (ref 12–16)
IMM GRANULOCYTES # BLD AUTO: 0.04 K/UL (ref 0–0.11)
IMM GRANULOCYTES NFR BLD AUTO: 0.4 % (ref 0–0.9)
LYMPHOCYTES # BLD AUTO: 2.22 K/UL (ref 1–4.8)
LYMPHOCYTES NFR BLD: 20.8 % (ref 22–41)
MCH RBC QN AUTO: 27.7 PG (ref 27–33)
MCHC RBC AUTO-ENTMCNC: 30.9 G/DL (ref 32.2–35.5)
MCV RBC AUTO: 89.9 FL (ref 81.4–97.8)
MONOCYTES # BLD AUTO: 0.56 K/UL (ref 0–0.85)
MONOCYTES NFR BLD AUTO: 5.2 % (ref 0–13.4)
NEUTROPHILS # BLD AUTO: 7.73 K/UL (ref 1.82–7.42)
NEUTROPHILS NFR BLD: 72.3 % (ref 44–72)
NRBC # BLD AUTO: 0 K/UL
NRBC BLD-RTO: 0 /100 WBC (ref 0–0.2)
PLATELET # BLD AUTO: 232 K/UL (ref 164–446)
PMV BLD AUTO: 11.2 FL (ref 9–12.9)
POTASSIUM SERPL-SCNC: 4.2 MMOL/L (ref 3.6–5.5)
PROT SERPL-MCNC: 6.8 G/DL (ref 6–8.2)
RBC # BLD AUTO: 4.65 M/UL (ref 4.2–5.4)
SODIUM SERPL-SCNC: 141 MMOL/L (ref 135–145)
TROPONIN T SERPL-MCNC: 35 NG/L (ref 6–19)
WBC # BLD AUTO: 10.7 K/UL (ref 4.8–10.8)

## 2024-08-09 PROCEDURE — 71045 X-RAY EXAM CHEST 1 VIEW: CPT

## 2024-08-09 PROCEDURE — 96372 THER/PROPH/DIAG INJ SC/IM: CPT | Mod: XU

## 2024-08-09 PROCEDURE — 84484 ASSAY OF TROPONIN QUANT: CPT

## 2024-08-09 PROCEDURE — 96374 THER/PROPH/DIAG INJ IV PUSH: CPT

## 2024-08-09 PROCEDURE — 84703 CHORIONIC GONADOTROPIN ASSAY: CPT

## 2024-08-09 PROCEDURE — 99285 EMERGENCY DEPT VISIT HI MDM: CPT

## 2024-08-09 PROCEDURE — 700102 HCHG RX REV CODE 250 W/ 637 OVERRIDE(OP): Mod: UD | Performed by: STUDENT IN AN ORGANIZED HEALTH CARE EDUCATION/TRAINING PROGRAM

## 2024-08-09 PROCEDURE — A9270 NON-COVERED ITEM OR SERVICE: HCPCS | Mod: UD | Performed by: STUDENT IN AN ORGANIZED HEALTH CARE EDUCATION/TRAINING PROGRAM

## 2024-08-09 PROCEDURE — 36415 COLL VENOUS BLD VENIPUNCTURE: CPT

## 2024-08-09 PROCEDURE — 700111 HCHG RX REV CODE 636 W/ 250 OVERRIDE (IP): Mod: JZ,UD | Performed by: STUDENT IN AN ORGANIZED HEALTH CARE EDUCATION/TRAINING PROGRAM

## 2024-08-09 PROCEDURE — 93005 ELECTROCARDIOGRAM TRACING: CPT | Performed by: STUDENT IN AN ORGANIZED HEALTH CARE EDUCATION/TRAINING PROGRAM

## 2024-08-09 PROCEDURE — 93005 ELECTROCARDIOGRAM TRACING: CPT

## 2024-08-09 PROCEDURE — 85025 COMPLETE CBC W/AUTO DIFF WBC: CPT

## 2024-08-09 PROCEDURE — 80053 COMPREHEN METABOLIC PANEL: CPT

## 2024-08-09 RX ORDER — ASPIRIN 325 MG
325 TABLET ORAL ONCE
Status: COMPLETED | OUTPATIENT
Start: 2024-08-09 | End: 2024-08-09

## 2024-08-09 RX ORDER — NITROGLYCERIN 0.4 MG/1
0.4 TABLET SUBLINGUAL ONCE
Status: COMPLETED | OUTPATIENT
Start: 2024-08-09 | End: 2024-08-09

## 2024-08-09 RX ORDER — KETOROLAC TROMETHAMINE 15 MG/ML
15 INJECTION, SOLUTION INTRAMUSCULAR; INTRAVENOUS ONCE
Status: DISCONTINUED | OUTPATIENT
Start: 2024-08-09 | End: 2024-08-09

## 2024-08-09 RX ORDER — KETOROLAC TROMETHAMINE 15 MG/ML
15 INJECTION, SOLUTION INTRAMUSCULAR; INTRAVENOUS ONCE
Status: COMPLETED | OUTPATIENT
Start: 2024-08-09 | End: 2024-08-09

## 2024-08-09 RX ADMIN — ASPIRIN 325 MG: 325 TABLET ORAL at 21:36

## 2024-08-09 RX ADMIN — NITROGLYCERIN 0.4 MG: 0.4 TABLET, ORALLY DISINTEGRATING SUBLINGUAL at 21:36

## 2024-08-09 RX ADMIN — KETOROLAC TROMETHAMINE 15 MG: 15 INJECTION, SOLUTION INTRAMUSCULAR; INTRAVENOUS at 21:36

## 2024-08-09 ASSESSMENT — FIBROSIS 4 INDEX: FIB4 SCORE: 0.73

## 2024-08-09 ASSESSMENT — PAIN DESCRIPTION - PAIN TYPE
TYPE: ACUTE PAIN
TYPE: ACUTE PAIN

## 2024-08-10 ENCOUNTER — APPOINTMENT (OUTPATIENT)
Dept: RADIOLOGY | Facility: MEDICAL CENTER | Age: 49
End: 2024-08-10
Payer: MEDICAID

## 2024-08-10 PROBLEM — I24.9 ACS (ACUTE CORONARY SYNDROME) (HCC): Status: ACTIVE | Noted: 2024-08-10

## 2024-08-10 PROBLEM — Z72.0 TOBACCO ABUSE: Status: ACTIVE | Noted: 2024-08-10

## 2024-08-10 LAB
MAGNESIUM SERPL-MCNC: 2.1 MG/DL (ref 1.5–2.5)
PROCALCITONIN SERPL-MCNC: 0.07 NG/ML
TROPONIN T SERPL-MCNC: 41 NG/L (ref 6–19)
TROPONIN T SERPL-MCNC: 43 NG/L (ref 6–19)

## 2024-08-10 PROCEDURE — G0378 HOSPITAL OBSERVATION PER HR: HCPCS

## 2024-08-10 PROCEDURE — 36415 COLL VENOUS BLD VENIPUNCTURE: CPT

## 2024-08-10 PROCEDURE — 700102 HCHG RX REV CODE 250 W/ 637 OVERRIDE(OP): Mod: UD

## 2024-08-10 PROCEDURE — A9270 NON-COVERED ITEM OR SERVICE: HCPCS | Mod: UD

## 2024-08-10 PROCEDURE — 700111 HCHG RX REV CODE 636 W/ 250 OVERRIDE (IP): Mod: UD

## 2024-08-10 PROCEDURE — 96374 THER/PROPH/DIAG INJ IV PUSH: CPT | Mod: XU

## 2024-08-10 PROCEDURE — 99223 1ST HOSP IP/OBS HIGH 75: CPT

## 2024-08-10 PROCEDURE — RXMED WILLOW AMBULATORY MEDICATION CHARGE: Performed by: HOSPITALIST

## 2024-08-10 PROCEDURE — 96375 TX/PRO/DX INJ NEW DRUG ADDON: CPT | Mod: XU

## 2024-08-10 PROCEDURE — 84484 ASSAY OF TROPONIN QUANT: CPT

## 2024-08-10 PROCEDURE — 700111 HCHG RX REV CODE 636 W/ 250 OVERRIDE (IP): Mod: JZ,UD

## 2024-08-10 PROCEDURE — A9502 TC99M TETROFOSMIN: HCPCS

## 2024-08-10 PROCEDURE — 83735 ASSAY OF MAGNESIUM: CPT

## 2024-08-10 PROCEDURE — 99285 EMERGENCY DEPT VISIT HI MDM: CPT

## 2024-08-10 PROCEDURE — 84145 PROCALCITONIN (PCT): CPT

## 2024-08-10 PROCEDURE — 96372 THER/PROPH/DIAG INJ SC/IM: CPT | Mod: XU

## 2024-08-10 RX ORDER — ONDANSETRON 2 MG/ML
4 INJECTION INTRAMUSCULAR; INTRAVENOUS EVERY 4 HOURS PRN
Status: DISCONTINUED | OUTPATIENT
Start: 2024-08-10 | End: 2024-08-11 | Stop reason: HOSPADM

## 2024-08-10 RX ORDER — ARIPIPRAZOLE 15 MG/1
30 TABLET ORAL EVERY MORNING
Status: DISCONTINUED | OUTPATIENT
Start: 2024-08-10 | End: 2024-08-11 | Stop reason: HOSPADM

## 2024-08-10 RX ORDER — ACETAMINOPHEN 325 MG/1
650 TABLET ORAL EVERY 6 HOURS PRN
Status: DISCONTINUED | OUTPATIENT
Start: 2024-08-10 | End: 2024-08-11 | Stop reason: HOSPADM

## 2024-08-10 RX ORDER — ATORVASTATIN CALCIUM 40 MG/1
40 TABLET, FILM COATED ORAL EVERY EVENING
Status: DISCONTINUED | OUTPATIENT
Start: 2024-08-10 | End: 2024-08-10

## 2024-08-10 RX ORDER — FUROSEMIDE 20 MG
20 TABLET ORAL
Status: DISCONTINUED | OUTPATIENT
Start: 2024-08-10 | End: 2024-08-11 | Stop reason: HOSPADM

## 2024-08-10 RX ORDER — METOPROLOL SUCCINATE 25 MG/1
25 TABLET, EXTENDED RELEASE ORAL DAILY
Status: DISCONTINUED | OUTPATIENT
Start: 2024-08-10 | End: 2024-08-11 | Stop reason: HOSPADM

## 2024-08-10 RX ORDER — REGADENOSON 0.08 MG/ML
INJECTION, SOLUTION INTRAVENOUS
Status: COMPLETED
Start: 2024-08-10 | End: 2024-08-10

## 2024-08-10 RX ORDER — ATORVASTATIN CALCIUM 20 MG/1
20 TABLET, FILM COATED ORAL
Status: DISCONTINUED | OUTPATIENT
Start: 2024-08-10 | End: 2024-08-11 | Stop reason: HOSPADM

## 2024-08-10 RX ORDER — MONTELUKAST SODIUM 10 MG/1
10 TABLET ORAL DAILY
Status: DISCONTINUED | OUTPATIENT
Start: 2024-08-10 | End: 2024-08-11 | Stop reason: HOSPADM

## 2024-08-10 RX ORDER — TRAMADOL HYDROCHLORIDE 50 MG/1
50 TABLET ORAL EVERY 6 HOURS PRN
Status: DISCONTINUED | OUTPATIENT
Start: 2024-08-10 | End: 2024-08-11 | Stop reason: HOSPADM

## 2024-08-10 RX ORDER — ENOXAPARIN SODIUM 100 MG/ML
40 INJECTION SUBCUTANEOUS DAILY
Status: DISCONTINUED | OUTPATIENT
Start: 2024-08-10 | End: 2024-08-10

## 2024-08-10 RX ORDER — GABAPENTIN 300 MG/1
300 CAPSULE ORAL 3 TIMES DAILY
Status: DISCONTINUED | OUTPATIENT
Start: 2024-08-10 | End: 2024-08-11 | Stop reason: HOSPADM

## 2024-08-10 RX ORDER — PANTOPRAZOLE SODIUM 40 MG/10ML
40 INJECTION, POWDER, LYOPHILIZED, FOR SOLUTION INTRAVENOUS DAILY
Status: DISCONTINUED | OUTPATIENT
Start: 2024-08-10 | End: 2024-08-11 | Stop reason: HOSPADM

## 2024-08-10 RX ORDER — FLUTICASONE PROPIONATE 50 MCG
2 SPRAY, SUSPENSION (ML) NASAL EVERY MORNING
Status: DISCONTINUED | OUTPATIENT
Start: 2024-08-10 | End: 2024-08-10

## 2024-08-10 RX ORDER — ALBUTEROL SULFATE 90 UG/1
1-2 AEROSOL, METERED RESPIRATORY (INHALATION) EVERY 6 HOURS PRN
Status: DISCONTINUED | OUTPATIENT
Start: 2024-08-10 | End: 2024-08-11 | Stop reason: HOSPADM

## 2024-08-10 RX ORDER — LABETALOL HYDROCHLORIDE 5 MG/ML
10 INJECTION, SOLUTION INTRAVENOUS EVERY 4 HOURS PRN
Status: DISCONTINUED | OUTPATIENT
Start: 2024-08-10 | End: 2024-08-11 | Stop reason: HOSPADM

## 2024-08-10 RX ORDER — ASPIRIN 81 MG/1
81 TABLET ORAL DAILY
Status: DISCONTINUED | OUTPATIENT
Start: 2024-08-10 | End: 2024-08-11 | Stop reason: HOSPADM

## 2024-08-10 RX ADMIN — ONDANSETRON 4 MG: 2 INJECTION INTRAMUSCULAR; INTRAVENOUS at 01:45

## 2024-08-10 RX ADMIN — GABAPENTIN 300 MG: 300 CAPSULE ORAL at 11:03

## 2024-08-10 RX ADMIN — METOPROLOL SUCCINATE 25 MG: 25 TABLET, EXTENDED RELEASE ORAL at 11:03

## 2024-08-10 RX ADMIN — APIXABAN 5 MG: 5 TABLET, FILM COATED ORAL at 17:34

## 2024-08-10 RX ADMIN — APIXABAN 5 MG: 5 TABLET, FILM COATED ORAL at 11:03

## 2024-08-10 RX ADMIN — GABAPENTIN 300 MG: 300 CAPSULE ORAL at 20:20

## 2024-08-10 RX ADMIN — ATORVASTATIN CALCIUM 20 MG: 20 TABLET, FILM COATED ORAL at 20:20

## 2024-08-10 RX ADMIN — LIDOCAINE HYDROCHLORIDE 30 ML: 20 SOLUTION OROPHARYNGEAL at 02:48

## 2024-08-10 RX ADMIN — MOMETASONE FUROATE AND FORMOTEROL FUMARATE DIHYDRATE 2 PUFF: 100; 5 AEROSOL RESPIRATORY (INHALATION) at 11:05

## 2024-08-10 RX ADMIN — ENOXAPARIN SODIUM 40 MG: 100 INJECTION SUBCUTANEOUS at 02:00

## 2024-08-10 RX ADMIN — ASPIRIN 81 MG: 81 TABLET, COATED ORAL at 06:30

## 2024-08-10 RX ADMIN — FUROSEMIDE 20 MG: 20 TABLET ORAL at 11:03

## 2024-08-10 RX ADMIN — GABAPENTIN 300 MG: 300 CAPSULE ORAL at 15:54

## 2024-08-10 RX ADMIN — REGADENOSON 0.4 MG: 0.08 INJECTION, SOLUTION INTRAVENOUS at 09:48

## 2024-08-10 RX ADMIN — ARIPIPRAZOLE 30 MG: 15 TABLET ORAL at 11:04

## 2024-08-10 RX ADMIN — MONTELUKAST 10 MG: 10 TABLET, FILM COATED ORAL at 11:05

## 2024-08-10 RX ADMIN — MOMETASONE FUROATE AND FORMOTEROL FUMARATE DIHYDRATE 2 PUFF: 100; 5 AEROSOL RESPIRATORY (INHALATION) at 19:17

## 2024-08-10 RX ADMIN — PANTOPRAZOLE SODIUM 40 MG: 40 INJECTION, POWDER, FOR SOLUTION INTRAVENOUS at 02:48

## 2024-08-10 ASSESSMENT — CHA2DS2 SCORE
VASCULAR DISEASE: YES
HYPERTENSION: YES
AGE 75 OR GREATER: NO
DIABETES: NO
SEX: FEMALE
AGE 65 TO 74: NO
CHF OR LEFT VENTRICULAR DYSFUNCTION: NO
CHA2DS2 VASC SCORE: 3
PRIOR STROKE OR TIA OR THROMBOEMBOLISM: NO

## 2024-08-10 ASSESSMENT — ENCOUNTER SYMPTOMS
CONSTIPATION: 0
CHILLS: 0
BLURRED VISION: 0
SPUTUM PRODUCTION: 0
ABDOMINAL PAIN: 0
HEADACHES: 0
COUGH: 0
BACK PAIN: 0
HEARTBURN: 1
VOMITING: 0
MYALGIAS: 0
PALPITATIONS: 0
WEAKNESS: 0
NERVOUS/ANXIOUS: 0
DIZZINESS: 0
WEIGHT LOSS: 0
DOUBLE VISION: 0
FEVER: 0
DIARRHEA: 0
SORE THROAT: 0
SHORTNESS OF BREATH: 0
NAUSEA: 1

## 2024-08-10 ASSESSMENT — FIBROSIS 4 INDEX: FIB4 SCORE: 0.96

## 2024-08-10 ASSESSMENT — SOCIAL DETERMINANTS OF HEALTH (SDOH)
WITHIN THE LAST YEAR, HAVE YOU BEEN KICKED, HIT, SLAPPED, OR OTHERWISE PHYSICALLY HURT BY YOUR PARTNER OR EX-PARTNER?: NO
WITHIN THE LAST YEAR, HAVE YOU BEEN HUMILIATED OR EMOTIONALLY ABUSED IN OTHER WAYS BY YOUR PARTNER OR EX-PARTNER?: NO
WITHIN THE LAST YEAR, HAVE YOU BEEN AFRAID OF YOUR PARTNER OR EX-PARTNER?: NO
WITHIN THE LAST YEAR, HAVE TO BEEN RAPED OR FORCED TO HAVE ANY KIND OF SEXUAL ACTIVITY BY YOUR PARTNER OR EX-PARTNER?: NO

## 2024-08-10 ASSESSMENT — LIFESTYLE VARIABLES
EVER HAD A DRINK FIRST THING IN THE MORNING TO STEADY YOUR NERVES TO GET RID OF A HANGOVER: NO
DOES PATIENT WANT TO STOP DRINKING: NO
ON A TYPICAL DAY WHEN YOU DRINK ALCOHOL HOW MANY DRINKS DO YOU HAVE: 0
SUBSTANCE_ABUSE: 0
HAVE PEOPLE ANNOYED YOU BY CRITICIZING YOUR DRINKING: NO
TOTAL SCORE: 0
TOTAL SCORE: 0
AVERAGE NUMBER OF DAYS PER WEEK YOU HAVE A DRINK CONTAINING ALCOHOL: 0
ALCOHOL_USE: NO
EVER FELT BAD OR GUILTY ABOUT YOUR DRINKING: NO
HAVE YOU EVER FELT YOU SHOULD CUT DOWN ON YOUR DRINKING: NO
HOW MANY TIMES IN THE PAST YEAR HAVE YOU HAD 5 OR MORE DRINKS IN A DAY: 0
TOTAL SCORE: 0
CONSUMPTION TOTAL: NEGATIVE

## 2024-08-10 ASSESSMENT — PAIN DESCRIPTION - PAIN TYPE
TYPE: ACUTE PAIN
TYPE: ACUTE PAIN

## 2024-08-10 ASSESSMENT — COPD QUESTIONNAIRES
DO YOU EVER COUGH UP ANY MUCUS OR PHLEGM?: NO/ONLY WITH OCCASIONAL COLDS OR INFECTIONS
HAVE YOU SMOKED AT LEAST 100 CIGARETTES IN YOUR ENTIRE LIFE: YES
IN THE PAST 12 MONTHS DO YOU DO LESS THAN YOU USED TO BECAUSE OF YOUR BREATHING PROBLEMS: DISAGREE/UNSURE

## 2024-08-10 NOTE — PROGRESS NOTES
Assumed pt care. Pt A&O x4, VSS. Pt c/o 9/10 chest pain. Administered medication per MAR. Updated pt on POC. Pt resting in bed with call light and belongings within reach. Bed alarm, treaded socks, fall risk band, and communication sign in place.

## 2024-08-10 NOTE — PROGRESS NOTES
Patient in bed awake,a&ox4,with general weakness,need assistance to get to [facility],lives in group home,pt needs ride,using oxygen at night,c/o mild chest pain 3/10,not in distress,sleepy,follow commands,poc explained.

## 2024-08-10 NOTE — H&P
[facility] Internal Medicine History & Physical Note    Date of Service  8/10/2024    Attending: Nick Bowie D.o.  Senior Resident: Dr. Fernandez  Contact Number: [phone]    Primary Care Physician  Pcp Pt States None    Consultants  None    Code Status  Full Code    Chief Complaint  Chief Complaint   Patient presents with    Chest Pain     CHEST PAIN ASSOCIATED WITH DIZZINESS AND SHORTNESS OF BREATH SINCE 5 PM    Shortness of Breath    Dizziness       History of Presenting Illness (HPI):   Lorene Haro is a 48 y.o. female who presented 8/9/2024 with complaints of atypical chest pain.  Past medical history of A-fib on anticoagulation, asthma, bipolar disorder type II, COPD on 2 L nasal cannula at night, hypertension, schizophrenic disorder.  Patient presented with complaints of chest pain, nauseas and epigastric pain.  Denies vomiting or diarrhea.  No fevers or chills.    In the ED, blood pressure 135/61, heart rate 68, respiratory rate 20, afebrile, SpO2 94% in 2 L nasal cannula.  Labs only remarkable for uptrending troponins from 35 to 43.  Chest x-ray showed cardiomegaly and persistent left base opacity.  Patient will be admitted for atypical chest pain and acute hypoxic respiratory failure.    I discussed the plan of care with patient and bedside RN.    Review of Systems  Review of Systems   Constitutional:  Negative for chills, fever, malaise/fatigue and weight loss.   HENT:  Negative for congestion and sore throat.    Eyes:  Negative for blurred vision and double vision.   Respiratory:  Negative for cough, sputum production and shortness of breath.    Cardiovascular:  Positive for chest pain. Negative for palpitations and leg swelling.   Gastrointestinal:  Positive for heartburn and nausea. Negative for abdominal pain, constipation, diarrhea and vomiting.   Genitourinary:  Negative for dysuria.   Musculoskeletal:  Negative for back pain, joint pain and myalgias.   Neurological:  Negative for dizziness,  weakness and headaches.   Psychiatric/Behavioral:  Negative for substance abuse. The patient is not nervous/anxious.        Past Medical History   has a past medical history of A-fib ([facility]), MONI (acute kidney injury) ([facility]) (8/9/2023), Asthma, Bipolar 2 disorder ([facility]), Chickenpox, Chronic obstructive pulmonary disease ([facility]), Hypertension, On home oxygen therapy, Other psychological stress, Schizophrenic disorder ([facility]), and Yeast infection of the skin (04/01/2021).    Surgical History   has a past surgical history that includes hysterectomy laparoscopy; colectomy; cholecystectomy; and knee arthroscopy (Left).     Family History  family history includes Cancer in her sister; No Known Problems in her mother.   Family history reviewed with patient.     Social History  Tobacco: Quit 1 month ago. Used to smoked 3-5 packs/day since 16 years old.  Alcohol: denies  Recreational drugs (illegal or prescription): denies  Recent Travel: denies  Primary Care Provider: Reviewed no PCP    Allergies  Allergies   Allergen Reactions    Penicillin G Rash and Itching     pt reports that she gets a rash all over her body and gets itchy    Amoxicillin Rash and Itching     Tolerates cephalosporins, pt reports that she gets a rash all over her body and gets itchy       Medications  Prior to Admission Medications   Prescriptions Last Dose Informant Patient Reported? Taking?   ADVAIR DISKUS 100-50 MCG/ACT AEROSOL POWDER, BREATH ACTIVATED  MAR from Other Facility No No   Sig: Inhale 1 Puff 2 times a day.   Home Care Oxygen  Patient Yes No   Sig: Inhale 2 L/min every evening.   acetaminophen (TYLENOL) 500 MG Tab  MAR from Other Facility Yes No   Sig: Take 500-1,000 mg by mouth every 6 hours as needed for Moderate Pain.   albuterol 108 (90 Base) MCG/ACT Aero Soln inhalation aerosol  MAR from Other Facility No No   Sig: Inhale 2 Puffs every 6 hours as needed for Shortness of Breath.   Patient taking differently: Inhale 1-2 Puffs every 6 hours as  needed for Shortness of Breath.   apixaban (ELIQUIS) 5mg Tab  MAR from Other Facility No No   Sig: Take 1 Tablet by mouth 2 times a day.   aripiprazole (ABILIFY) 30 MG tablet  MAR from Other Facility No No   Sig: Take 1 Tablet by mouth every morning. Indications: Major Depressive Disorder   atorvastatin (LIPITOR) 20 MG Tab  MAR from Other Facility No No   Sig: Take 1 Tablet by mouth at bedtime.   benzonatate (TESSALON) 100 MG Cap  MAR from Other Facility No No   Sig: Take 1 Capsule by mouth 3 times a day as needed for Cough.   cefdinir (OMNICEF) 300 MG Cap  MAR from Other Facility No No   Sig: Take 1 Capsule by mouth 2 times a day.   dicyclomine (BENTYL) 20 MG Tab  MAR from Other Facility Yes No   Sig: Take 20 mg by mouth every 6 hours as needed (abdominal pain or diarrhea).   famotidine (PEPCID) 20 MG Tab  MAR from Other Facility Yes No   Sig: Take 20 mg by mouth 2 times a day as needed (for pain).   fluticasone (FLONASE) 50 MCG/ACT nasal spray  MAR from Other Facility Yes No   Sig: Administer 2 Sprays into each nostril every morning.   furosemide (LASIX) 20 MG Tab  MAR from Other Facility Yes No   Sig: Take 20 mg by mouth 1 time a day as needed (swelling).   gabapentin (NEURONTIN) 300 MG Cap  MAR from Other Facility Yes No   Sig: Take 300 mg by mouth 3 times a day.   ibuprofen (MOTRIN) 800 MG Tab  MAR from Other Facility Yes No   Sig: Take 800 mg by mouth every 6 hours as needed for Moderate Pain or Mild Pain.   meclizine (ANTIVERT) 25 MG Tab  MAR from Other Facility No No   Sig: Take 1 Tablet by mouth 3 times a day as needed for Vertigo.   methocarbamol (ROBAXIN) 750 MG Tab  MAR from Other Facility Yes No   Sig: Take 750 mg by mouth 3 times a day as needed (muscle spasms).   metoprolol SR (TOPROL XL) 25 MG TABLET SR 24 HR  MAR from Other Facility Yes No   Sig: Take 25 mg by mouth every day.   montelukast (SINGULAIR) 10 MG Tab  MAR from Other Facility No No   Sig: Take 1 Tablet by mouth every evening.   Patient  taking differently: Take 10 mg by mouth every day.   nicotine (NICODERM) 7 MG/24HR PATCH 24 HR  MAR from Other Facility Yes No   Sig: Place 1 Patch on the skin every 24 hours.   ondansetron (ZOFRAN ODT) 4 MG TABLET DISPERSIBLE  MAR from Other Facility Yes No   Sig: Take 4 mg by mouth every 6 hours as needed for Nausea/Vomiting.   predniSONE (DELTASONE) 20 MG Tab  MAR from Other Facility Yes No   Sig: Take 40 mg by mouth every day. X 6 days  40 mg = 2 tabs   traMADol (ULTRAM) 50 MG Tab  MAR from Other Facility Yes No   Sig: Take 50 mg by mouth every 6 hours as needed for Moderate Pain.      Facility-Administered Medications: None       Physical Exam  Temp:  [36.3 °C (97.4 °F)-36.7 °C (98.1 °F)] 36.3 °C (97.4 °F)  Pulse:  [60-69] 65  Resp:  [17-20] 17  BP: ()/(54-79) 92/54  SpO2:  [93 %-94 %] 93 %  Blood Pressure: (!) 141/79   Temperature: 36.7 °C (98.1 °F)   Pulse: 61   Respiration: 18   Pulse Oximetry: 94 %       Physical Exam  Constitutional:       General: She is not in acute distress.     Appearance: Normal appearance. She is not ill-appearing.   HENT:      Head: Normocephalic and atraumatic.      Nose: Nose normal.      Mouth/Throat:      Mouth: Mucous membranes are moist.   Eyes:      Extraocular Movements: Extraocular movements intact.      Conjunctiva/sclera: Conjunctivae normal.      Pupils: Pupils are equal, round, and reactive to light.   Cardiovascular:      Rate and Rhythm: Normal rate and regular rhythm.      Pulses: Normal pulses.      Heart sounds: Normal heart sounds.      Comments: Tender to touch at costosternal junctions  Pulmonary:      Effort: Pulmonary effort is normal. No respiratory distress.      Breath sounds: Normal breath sounds.   Chest:      Chest wall: No tenderness.   Abdominal:      General: Bowel sounds are normal. There is no distension.      Palpations: Abdomen is soft.      Tenderness: There is no abdominal tenderness. There is no right CVA tenderness or left CVA  "tenderness.   Musculoskeletal:         General: No swelling or tenderness. Normal range of motion.      Cervical back: Normal range of motion and neck supple.      Right lower leg: No edema.      Left lower leg: No edema.   Skin:     General: Skin is warm.      Capillary Refill: Capillary refill takes less than 2 seconds.      Coloration: Skin is not jaundiced or pale.   Neurological:      General: No focal deficit present.      Mental Status: She is alert and oriented to person, place, and time.      Cranial Nerves: No cranial nerve deficit.      Sensory: No sensory deficit.      Motor: No weakness.   Psychiatric:         Mood and Affect: Mood normal.         Laboratory:  Recent Labs     08/09/24 2031   WBC 10.7   RBC 4.65   HEMOGLOBIN 12.9   HEMATOCRIT 41.8   MCV 89.9   MCH 27.7   MCHC 30.9*   RDW 53.9*   PLATELETCT 232   MPV 11.2     Recent Labs     08/09/24 2031   SODIUM 141   POTASSIUM 4.2   CHLORIDE 103   CO2 24   GLUCOSE 164*   BUN 14   CREATININE 0.97   CALCIUM 9.4     Recent Labs     08/09/24 2031   ALTSGPT 13   ASTSGOT 16   ALKPHOSPHAT 137*   TBILIRUBIN 0.7   GLUCOSE 164*         No results for input(s): \"NTPROBNP\" in the last 72 hours.      Recent Labs     08/09/24 2031 08/09/24 2251 08/10/24  0149   TROPONINT 35* 43* 41*       Imaging:  DX-CHEST-PORTABLE (1 VIEW)   Final Result      1.  Cardiomegaly.   2.  Persistent left base opacity, possibly pneumonia.      NM-CARDIAC STRESS TEST    (Results Pending)       X-Ray:  I have personally reviewed the images and compared with prior images.  EKG:  I have personally reviewed the images and compared with prior images.    Assessment/Plan:  Problem Representation:   I anticipate this patient will require at least two midnights for appropriate medical management, necessitating inpatient admission because chest pain    Patient will need a Telemetry bed on MEDICAL service .  The need is secondary to chest pain.    * Chest pain, likely musculoskeletal- (present " on admission)  Assessment & Plan  Patient presented with atypical chest pain. Tender to touch at costosternal junctions make most likely costochondritis diagnosis.  Elevated troponins from 35-43.  But also patient referred epigastric pain with some acid reflux and nauseous.  -Holding NSAIDs for now since some GI component  -GI cocktail        ACS (acute coronary syndrome) (HCC)  Assessment & Plan  Patient came with chest pain and trended up troponins from 35 to 43.  -Aspirin 81 mg  -High intensity statin  -Trending troponins  -On telemetry and continuous pulse ox  -Stress test in the a.m.    Tobacco abuse  Assessment & Plan  Patient used to smoked 3-5 packs/day, quit 1 month ago.  Nicotine patch and/or gum offered and patient accepted.   I discussed cessation with patient including starting on nicotine patch and/or gum on discharge.  I also discussed medications to help with cessation with patient including Wellbutrin and Chantix, not interested at this time .  Smoking cessation discussed with patient for 4 minutes.         VTE prophylaxis: enoxaparin ppx    Yanely Fernandez MD  Internal Medicine PGY-2

## 2024-08-10 NOTE — ED TRIAGE NOTES
Chief Complaint   Patient presents with    Chest Pain     CHEST PAIN ASSOCIATED WITH DIZZINESS AND SHORTNESS OF BREATH SINCE 5 PM    Shortness of Breath    Dizziness     Pain: 9/10    Pt came in to triage ambulatory with walker or the above complaints.     Pt is alert and oriented x 4, speaking in full sentences, follows commands and responds appropriately to questions.     Respirations are even and unlabored.    Pt placed in lobby. Pt educated on triage process.     Pt encouraged to inform staff for any changes in condition or if needs help while waiting to be room in.    Vitals:    08/09/24 2009   BP: 134/58   Pulse: 69   Resp: 18   Temp: 36.7 °C (98.1 °F)   SpO2: 94%

## 2024-08-10 NOTE — PROGRESS NOTES
PT is unable to assist in her medication history.    PT resides at [facility] ([phone]) and was sent with no MAR. I called the facility and there was no answer and no option to leave a voice message to request a MAR.    I called the phone number listed as the PT's preferred contact and there was no answer. No message was left.    I called PT's sister Kylah ([phone]) in an attempt to get [facility] contact information or medication history information and she was unable to provide either.    Allergies were reviewed with PT at bedside.    PT's preferred pharmacy is [facility] in [place] ([phone])

## 2024-08-10 NOTE — ASSESSMENT & PLAN NOTE
Patient presented with atypical chest pain. Tender to touch at costosternal junctions make most likely costochondritis diagnosis.  Elevated troponins from 35-43.  But also patient referred epigastric pain with some acid reflux and nauseous.  -Holding NSAIDs for now since some GI component  -GI cocktail

## 2024-08-10 NOTE — ASSESSMENT & PLAN NOTE
Patient came with chest pain and trended up troponins from 35 to 43.  -Aspirin 81 mg  -High intensity statin    -On telemetry and continuous pulse ox

## 2024-08-10 NOTE — PROGRESS NOTES
Pt incontinent x 1 ,pt took to the bathroom,walked with her walker,at times pt unsteady,pt showered,back to bed safe.

## 2024-08-10 NOTE — ED PROVIDER NOTES
ED Provider Note    CHIEF COMPLAINT  Chief Complaint   Patient presents with    Chest Pain     CHEST PAIN ASSOCIATED WITH DIZZINESS AND SHORTNESS OF BREATH SINCE 5 PM    Shortness of Breath    Dizziness       EXTERNAL RECORDS REVIEWED  Patient notes.  Patient was admitted and discharged from the hospital on 30 July for chest pain.  It was ultimately thought to be musculoskeletal in etiology.  She did have an echocardiogram which showed an EF of 59%, no regional wall motion abnormality and unchanged since May.  She did have a nuclear stress test that has been performed in the last 11 months and therefore this was not repeated.    HPI/ROS  LIMITATION TO HISTORY   None  OUTSIDE HISTORIAN(S):  None    Lorene Haro is a 48 y.o. female who presents chest pain.  She says it started at 5:00PM this evening.  She was sitting at rest when symptoms started.  There was no onset or worsening with exertion.  She does have associated shortness of breath and nausea but no diaphoresis or vomiting.  There is no radiation of the pain.  She does feel short of breath.  Pain is also worse when she takes deep breath.  She says it is the same pain as when she was just admitted to the hospital.  She denies any recent URI symptoms.  She has no history of PE or DVT.  She is on anticoagulation for atrial fibrillation and is compliant.  She has no hemoptysis, unilateral leg swelling.     She has a past medical history of obesity, chronic respiratory failure on 2 L of oxygen at night, COPD, atrial fibrillation on Eliquis    PAST MEDICAL HISTORY   has a past medical history of A-fib ([facility]), MONI (acute kidney injury) ([facility]) (8/9/2023), Asthma, Bipolar 2 disorder ([facility]), Chickenpox, Chronic obstructive pulmonary disease ([facility]), Hypertension, On home oxygen therapy, Other psychological stress, Schizophrenic disorder ([facility]), and Yeast infection of the skin (04/01/2021).    SURGICAL HISTORY   has a past surgical history that includes hysterectomy  laparoscopy; colectomy; cholecystectomy; and knee arthroscopy (Left).    FAMILY HISTORY  Family History   Problem Relation Age of Onset    No Known Problems Mother     Cancer Sister        SOCIAL HISTORY  Social History     Tobacco Use    Smoking status: Former     Current packs/day: 0.00     Types: Cigarettes     Quit date: 10/12/2022     Years since quittin.8    Smokeless tobacco: Never   Vaping Use    Vaping status: Never Used   Substance and Sexual Activity    Alcohol use: Not Currently    Drug use: Never    Sexual activity: Not Currently       CURRENT MEDICATIONS  Home Medications       Reviewed by Chloe Reid R.N. (Registered Nurse) on 24 at   Med List Status: Partial     Medication Last Dose Status   acetaminophen (TYLENOL) 500 MG Tab  Active   ADVAIR DISKUS 100-50 MCG/ACT AEROSOL POWDER, BREATH ACTIVATED  Active   albuterol 108 (90 Base) MCG/ACT Aero Soln inhalation aerosol  Active   apixaban (ELIQUIS) 5mg Tab  Active   aripiprazole (ABILIFY) 30 MG tablet  Active   atorvastatin (LIPITOR) 20 MG Tab  Active   benzonatate (TESSALON) 100 MG Cap  Active   cefdinir (OMNICEF) 300 MG Cap  Active   dicyclomine (BENTYL) 20 MG Tab  Active   famotidine (PEPCID) 20 MG Tab  Active   fluticasone (FLONASE) 50 MCG/ACT nasal spray  Active   furosemide (LASIX) 20 MG Tab  Active   gabapentin (NEURONTIN) 300 MG Cap  Active   Home Care Oxygen  Active   ibuprofen (MOTRIN) 800 MG Tab  Active   meclizine (ANTIVERT) 25 MG Tab  Active   methocarbamol (ROBAXIN) 750 MG Tab  Active   metoprolol SR (TOPROL XL) 25 MG TABLET SR 24 HR  Active   montelukast (SINGULAIR) 10 MG Tab  Active   nicotine (NICODERM) 7 MG/24HR PATCH 24 HR  Active   ondansetron (ZOFRAN ODT) 4 MG TABLET DISPERSIBLE  Active   predniSONE (DELTASONE) 20 MG Tab  Active   traMADol (ULTRAM) 50 MG Tab  Active                  Audit from Redirected Encounters    **Home medications have not yet been reviewed for this encounter**         ALLERGIES  Allergies  "  Allergen Reactions    Penicillin G Rash and Itching     pt reports that she gets a rash all over her body and gets itchy    Amoxicillin Rash and Itching     Tolerates cephalosporins, pt reports that she gets a rash all over her body and gets itchy       PHYSICAL EXAM  VITAL SIGNS: /56   Pulse 66   Temp 36.6 °C (97.8 °F) (Temporal)   Resp 18   Ht 1.575 m (5' 2\")   Wt 123 kg (272 lb)   LMP  (LMP Unknown)   SpO2 93%   BMI 49.75 kg/m²    Constitutional: Awake and alert. She is obese. Non toxic  HENT: Normal inspection    Eyes: Normal inspection  Neck: Grossly normal range of motion.  Cardiovascular: Normal heart rate, Normal rhythm.  Symmetric peripheral pulses.   Thorax & Lungs: No respiratory distress, No wheezing, No rales, No rhonchi, She has right chest wall tenderness.   Abdomen: Soft, non-distended, nontender to palpation in all 4 quadrants, no mass  Skin: No obvious rash.  Extremities: Warm, well perfused. No clubbing, cyanosis,. She has bilateral lower extremity edema   Neurologic: Grossly normal   Psychiatric: Normal for situation      EKG/LABS  Results for orders placed or performed during the hospital encounter of 08/09/24   CBC with Differential   Result Value Ref Range    WBC 10.7 4.8 - 10.8 K/uL    RBC 4.65 4.20 - 5.40 M/uL    Hemoglobin 12.9 12.0 - 16.0 g/dL    Hematocrit 41.8 37.0 - 47.0 %    MCV 89.9 81.4 - 97.8 fL    MCH 27.7 27.0 - 33.0 pg    MCHC 30.9 (L) 32.2 - 35.5 g/dL    RDW 53.9 (H) 35.9 - 50.0 fL    Platelet Count 232 164 - 446 K/uL    MPV 11.2 9.0 - 12.9 fL    Neutrophils-Polys 72.30 (H) 44.00 - 72.00 %    Lymphocytes 20.80 (L) 22.00 - 41.00 %    Monocytes 5.20 0.00 - 13.40 %    Eosinophils 1.00 0.00 - 6.90 %    Basophils 0.30 0.00 - 1.80 %    Immature Granulocytes 0.40 0.00 - 0.90 %    Nucleated RBC 0.00 0.00 - 0.20 /100 WBC    Neutrophils (Absolute) 7.73 (H) 1.82 - 7.42 K/uL    Lymphs (Absolute) 2.22 1.00 - 4.80 K/uL    Monos (Absolute) 0.56 0.00 - 0.85 K/uL    Eos " (Absolute) 0.11 0.00 - 0.51 K/uL    Baso (Absolute) 0.03 0.00 - 0.12 K/uL    Immature Granulocytes (abs) 0.04 0.00 - 0.11 K/uL    NRBC (Absolute) 0.00 K/uL   Complete Metabolic Panel (CMP)   Result Value Ref Range    Sodium 141 135 - 145 mmol/L    Potassium 4.2 3.6 - 5.5 mmol/L    Chloride 103 96 - 112 mmol/L    Co2 24 20 - 33 mmol/L    Anion Gap 14.0 7.0 - 16.0    Glucose 164 (H) 65 - 99 mg/dL    Bun 14 8 - 22 mg/dL    Creatinine 0.97 0.50 - 1.40 mg/dL    Calcium 9.4 8.5 - 10.5 mg/dL    Correct Calcium 9.6 8.5 - 10.5 mg/dL    AST(SGOT) 16 12 - 45 U/L    ALT(SGPT) 13 2 - 50 U/L    Alkaline Phosphatase 137 (H) 30 - 99 U/L    Total Bilirubin 0.7 0.1 - 1.5 mg/dL    Albumin 3.8 3.2 - 4.9 g/dL    Total Protein 6.8 6.0 - 8.2 g/dL    Globulin 3.0 1.9 - 3.5 g/dL    A-G Ratio 1.3 g/dL   Troponins NOW   Result Value Ref Range    Troponin T 35 (H) 6 - 19 ng/L   Troponins in two (2) hours   Result Value Ref Range    Troponin T 43 (H) 6 - 19 ng/L   HCG Qual Serum   Result Value Ref Range    Beta-Hcg Qualitative Serum Negative Negative   ESTIMATED GFR   Result Value Ref Range    GFR (CKD-EPI) 71 >60 mL/min/1.73 m 2   TROPONIN   Result Value Ref Range    Troponin T 41 (H) 6 - 19 ng/L   MAGNESIUM   Result Value Ref Range    Magnesium 2.1 1.5 - 2.5 mg/dL   EKG   Result Value Ref Range    Report       [facility] Emergency Dept.    Test Date:  2024  Pt Name:    ADELINA MILLAN                Department: ER  MRN:        [id number]                      Room:  Gender:     Female                       Technician: [id number]  :        [id number]                   Requested By:ER TRIAGE PROTOCOL  Order #:    [id number]                    Reading MD: Cecelia Holguin    Measurements  Intervals                                Axis  Rate:       72                           P:          60  OH:         137                          QRS:        37  QRSD:       108                          T:          3  QT:         374  QTc:         410    Interpretive Statements  Sinus rhythm  Atrial premature complexes  Low voltage, precordial leads  RSR' in V1 or V2, right VCD or RVH  Compared to ECG 07/30/2024 12:55:28  Atrial premature complex(es) now present  Electronically Signed On 08- 21:16:33 PDT by Cecelia Holguin       *Note: Due to a large number of results and/or encounters for the requested time period, some results have not been displayed. A complete set of results can be found in Results Review.       I have independently interpreted this EKG    RADIOLOGY/PROCEDURES   I have independently interpreted the diagnostic imaging associated with this visit and am waiting the final reading from the radiologist.   My preliminary interpretation is as follows: Normal appearing mediastinum     Radiologist interpretation:  DX-CHEST-PORTABLE (1 VIEW)   Final Result      1.  Cardiomegaly.   2.  Persistent left base opacity, possibly pneumonia.      NM-CARDIAC STRESS TEST    (Results Pending)       COURSE & MEDICAL DECISION MAKING    ASSESSMENT, COURSE AND PLAN  Care Narrative: This is a 48-year-old with history of atrial fibrillation on Eliquis, chronic hypoxic respiratory failure and frequent visits to the emergency department in the setting of chest pain who presents with acute onset chest pain.  On arrival she is stable, well-appearing. She is euvolemic. For pain she was given aspirin, nitroglycerin and Toradol with some improvement.  She has no gross ischemic changes on EKG.  Her initial troponin was at baseline however second troponin has increased from 35 to 43.   I considered pulmonary embolism however she is compliant with her anticoagulation and has no hypoxia or tachycardia or other risk factors for this.  She does have an left lung base opacity on x-ray but has no signs or symptoms consistent with a pneumonia and this has been seen on imaging previously.  She has no significant hypertension or widened mediastinum to suggest aortic dissection.  I  did discuss with the hospitalist my concern given her acute rise in troponin.  She will be admitted and undergo stress test.  She remained stable during her time in the ER    DISPOSITION AND DISCUSSIONS  I have discussed management of the patient with the following physicians and JAY's:  Dr. Bowie      Decision tools and prescription drugs considered including, but not limited to:  HEART score 4  .    FINAL DIAGNOSIS  1. Chest pain, unspecified type Acute   2. Elevated troponin Acute        Electronically signed by: Cecelia Holguin M.D., 8/9/2024 9:03 PM

## 2024-08-10 NOTE — CARE PLAN
The patient is Stable - Low risk of patient condition declining or worsening    Shift Goals  Patient Goals: Pain Control, Rest  Family Goals: ARAM    Progress made toward(s) clinical / shift goals:      Problem: Knowledge Deficit - Standard  Goal: Patient and family/care givers will demonstrate understanding of plan of care, disease process/condition, diagnostic tests and medications  Description: Target End Date:  1-3 days or as soon as patient condition allows    Document in Patient Education    1.  Patient and family/caregiver oriented to unit, equipment, visitation policy and means for communicating concern  2.  Complete/review Learning Assessment  3.  Assess knowledge level of disease process/condition, treatment plan, diagnostic tests and medications  4.  Explain disease process/condition, treatment plan, diagnostic tests and medications  Outcome: Progressing     Problem: Pain - Standard  Goal: Alleviation of pain or a reduction in pain to the patient’s comfort goal  Description: Target End Date:  Prior to discharge or change in level of care    Document on Vitals flowsheet    1.  Document pain using the appropriate pain scale per order or unit policy  2.  Educate and implement non-pharmacologic comfort measures (i.e. relaxation, distraction, massage, cold/heat therapy, etc.)  3.  Pain management medications as ordered  4.  Reassess pain after pain med administration per policy  5.  If opiods administered assess patient's response to pain medication is appropriate per POSS sedation scale  6.  Follow pain management plan developed in collaboration with patient and interdisciplinary team (including palliative care or pain specialists if applicable)  Outcome: Progressing     Problem: Skin Integrity  Goal: Skin integrity is maintained or improved  Description: Target End Date:  Prior to discharge or change in level of care    Document interventions on Skin Risk/[place] flowsheet groups and corresponding LDA    1.   Assess and monitor skin integrity, appearance and/or temperature  2.  Assess risk factors for impaired skin integrity and/or pressures ulcers  3.  Implement precautions to protect skin integrity in collaboration with interdisciplinary team  4.  Implement pressure ulcer prevention protocol if at risk for skin breakdown  5.  Confirm wound care consult if at risk for skin breakdown  6.  Ensure patient use of pressure relieving devices  (Low air loss bed, waffle overlay, heel protectors, ROHO cushion, etc)  Outcome: Progressing     Problem: Optimal Care for the Acute Coronary Syndrome Patient  Goal: Optimal Care for the Acute Coronary Syndrome Patient  Description: Target End Date:  1 to 3 days  Outcome: Progressing     Problem: Respiratory  Goal: Patient will achieve/maintain optimum respiratory ventilation and gas exchange  Description: Target End Date:  Prior to discharge or change in level of care    Document on Assessment flowsheet    1.  Assess and monitor rate, rhythm, depth and effort of respiration  2.  Breath sounds assessed qshift and/or as needed  3.  Assess O2 saturation, administer/titrate oxygen as ordered  4.  Position patient for maximum ventilatory efficiency  5.  Turn, cough, and deep breath with splinting to improve effectiveness  6.  Collaborate with RT to administer medication/treatments per order  7.  Encourage use of incentive spirometer and encourage patient to cough after use and utilize splinting techniques if applicable  8.  Airway suctioning  9.  Monitor sputum production for changes in color, consistency and frequency  10. Perform frequent oral hygiene  11. Alternate physical activity with rest periods  Outcome: Progressing     Problem: Hemodynamics  Goal: Patient's hemodynamics, fluid balance and neurologic status will be stable or improve  Description: Target End Date:  Prior to discharge or change in level of care    Document on Assessment and I/O flowsheet templates    1.  Monitor vital  signs, pulse oximetry and cardiac monitor per provider order and/or policy  2.  Maintain blood pressure per provider order  3.  Hemodynamic monitoring per provider order  4.  Manage IV fluids and IV infusions  5.  Monitor intake and output  6.  Daily weights per unit policy or provider order  7.  Assess peripheral pulses and capillary refill  8.  Assess color and body temperature  9.  Position patient for maximum circulation/cardiac output  10. Monitor for signs/symptoms of excessive bleeding  11. Assess mental status, restlessness and changes in level of consciousness  12. Monitor temperature and report fever or hypothermia to provider immediately. Consideration of targeted temperature management.  Outcome: Progressing

## 2024-08-10 NOTE — PROGRESS NOTES
4 Eyes Skin Assessment Completed by JC Laird and JC Dennis.    Head Redness  Ears Redness  Nose Redness  Mouth WDL  Neck WDL  Breast/Chest WDL  Shoulder Blades WDL  Spine WDL  (R) Arm/Elbow/Hand WDL  (L) Arm/Elbow/Hand WDL  Abdomen WDL  Groin WDL  Scrotum/Coccyx/Buttocks Redness and Blanching  (R) Leg Redness and Blanching, Cracking  (L) Leg Redness and Blanching, Cracking  (R) Heel/Foot/Toe WDL  (L) Heel/Foot/Toe WDL    Devices In Places Tele Box, Blood Pressure Cuff, Pulse Ox, and Nasal Cannula    Interventions In Place Gray Ear Foams, Pillows, and Barrier Cream    Possible Skin Injury No    Pictures Uploaded Into Epic N/A  Wound Consult Placed N/A  RN Wound Prevention Protocol Ordered No

## 2024-08-10 NOTE — RESPIRATORY CARE
" COPD EDUCATION by COPD CLINICAL EDUCATOR  8/10/2024 at 1:57 PM by Maricarmen Manzano, RRT     Patient reviewed by COPD education team. Patient does not qualify for the COPD program because PFT's reveal restriction. Worked with patient on last admit on proper use of spacer. Patient endorses she still has.      COPD Screen  COPD Risk Screening  Do you have a history of COPD?: No (restriction on PFT , pt endorses smoking cessation a month ago. Worked with pat last visit 7/31- spacer education provided.)  COPD Population Screener  Do you ever cough up any mucus or phlegm?: No/only with occasional colds or infections  In the past 12 months, you do less than you used to because of your breathing problems: Disagree/unsure  Have you smoked at least 100 cigarettes in your entire life?: Yes  How [age] are [age] 50-57    COPD Assessment       PFT Results    9/21 FEV1 1.24 48%, FVC 1.45 46%, FEV1/FVC 86%    Meds to Beds        MY COPD ACTION PLAN     It is recommended that patients and physicians /healthcare providers complete this action plan together. This plan should be discussed at each physician visit and updated as needed.    The green, yellow and red zones show groups of symptoms of COPD. This list of symptoms is not comprehensive, and you may experience other symptoms. In the \"Actions\" column, your healthcare provider has recommended actions for you to take based on your symptoms.    Patient Name: Lorene Haro   YOB: 1975   Last Updated on: 7/31/2024 12:47 PM   Green Zone:  I am doing well today Actions     Usual activitiy and exercise level   Take daily medications     Usual amounts of cough and phlegm/mucus   Use oxygen as prescribed     Sleep well at night   Continue regular exercise/diet plan     Appetite is good   At all times avoid cigarette smoke, inhaled irritants     Daily Medications (these medications are taken every day):   Fluticosone/Salmeterol (Advair) 1 Puff Twice daily     Additional " "Information:  Rinse mouth and spit after using Advair    Yellow Zone:  I am having a bad day or a COPD flare Actions     More breathless than usual   Continue daily medications     I have less energy for my daily activities   Use quick relief inhaler as ordered     Increased or thicker phlegm/mucus   Use oxygen as prescribed     Using quick relief inhaler/nebulizer more often   Get plenty of rest     Swelling of ankles more than usual   Use pursed lip breathing     More coughing than usual   At all times avoid cigarette smoke, inhaled irritants     I feel like I have a \"chest cold\"     Poor sleep and my symptoms woke me up     My appetite is not good     My medicine is not helping      Call provider immediately if symptoms don’t improve     Continue daily medications, add rescue medications:   Albuterol  Albuterol 2 Puffs  3mL via nebulizer Every 6 hours PRN         Medications to be used during a flare up, (as Discussed with Provider):           Additional Information:  Use spacer with rescue inhaler  use nebulizer as directed by your Doctor    Red Zone:  I need urgent medical care Actions     Severe shortness of breath even at rest   Call 911 or seek medical care immediately     Not able to do any activity because of breathing      Fever or shaking chills      Feeling confused or very drowsy       Chest pains      Coughing up blood                  "

## 2024-08-10 NOTE — CARE PLAN
The patient is Watcher - Medium risk of patient condition declining or worsening    Shift Goals  Clinical Goals: stress test  Patient Goals: rest  Family Goals: ARAM    Progress made toward(s) clinical / shift goals:  Stress test    Patient is not progressing towards the following goals:      Problem: Knowledge Deficit - Standard  Goal: Patient and family/care givers will demonstrate understanding of plan of care, disease process/condition, diagnostic tests and medications  Outcome: Progressing     Problem: Pain - Standard  Goal: Alleviation of pain or a reduction in pain to the patient’s comfort goal  Outcome: Progressing     Problem: Skin Integrity  Goal: Skin integrity is maintained or improved  Outcome: Progressing     Problem: Respiratory  Goal: Patient will achieve/maintain optimum respiratory ventilation and gas exchange  Outcome: Progressing     Problem: Hemodynamics  Goal: Patient's hemodynamics, fluid balance and neurologic status will be stable or improve  Outcome: Progressing

## 2024-08-10 NOTE — PROGRESS NOTES
Monitor summary:  SR rhythm  rate 63  PVC ectopy   0.13/0.06/0.36  No image of monitor summary sheet available.

## 2024-08-11 ENCOUNTER — PHARMACY VISIT (OUTPATIENT)
Dept: PHARMACY | Facility: MEDICAL CENTER | Age: 49
End: 2024-08-11
Payer: COMMERCIAL

## 2024-08-11 VITALS
RESPIRATION RATE: 18 BRPM | OXYGEN SATURATION: 94 % | HEIGHT: 62 IN | SYSTOLIC BLOOD PRESSURE: 108 MMHG | DIASTOLIC BLOOD PRESSURE: 65 MMHG | TEMPERATURE: 98.2 F | BODY MASS INDEX: 50.96 KG/M2 | WEIGHT: 276.9 LBS | HEART RATE: 67 BPM

## 2024-08-11 LAB
ALBUMIN SERPL BCP-MCNC: 3.5 G/DL (ref 3.2–4.9)
ALBUMIN/GLOB SERPL: 1.3 G/DL
ALP SERPL-CCNC: 121 U/L (ref 30–99)
ALT SERPL-CCNC: 10 U/L (ref 2–50)
ANION GAP SERPL CALC-SCNC: 11 MMOL/L (ref 7–16)
AST SERPL-CCNC: 7 U/L (ref 12–45)
BASOPHILS # BLD AUTO: 0.2 % (ref 0–1.8)
BASOPHILS # BLD: 0.02 K/UL (ref 0–0.12)
BILIRUB SERPL-MCNC: 0.8 MG/DL (ref 0.1–1.5)
BUN SERPL-MCNC: 13 MG/DL (ref 8–22)
CALCIUM ALBUM COR SERPL-MCNC: 9.4 MG/DL (ref 8.5–10.5)
CALCIUM SERPL-MCNC: 9 MG/DL (ref 8.5–10.5)
CHLORIDE SERPL-SCNC: 106 MMOL/L (ref 96–112)
CO2 SERPL-SCNC: 27 MMOL/L (ref 20–33)
CREAT SERPL-MCNC: 1.03 MG/DL (ref 0.5–1.4)
EOSINOPHIL # BLD AUTO: 0.12 K/UL (ref 0–0.51)
EOSINOPHIL NFR BLD: 1.4 % (ref 0–6.9)
ERYTHROCYTE [DISTWIDTH] IN BLOOD BY AUTOMATED COUNT: 53.9 FL (ref 35.9–50)
GFR SERPLBLD CREATININE-BSD FMLA CKD-EPI: 66 ML/MIN/1.73 M 2
GLOBULIN SER CALC-MCNC: 2.6 G/DL (ref 1.9–3.5)
GLUCOSE SERPL-MCNC: 130 MG/DL (ref 65–99)
GRAM STN SPEC: NORMAL
HCT VFR BLD AUTO: 37.9 % (ref 37–47)
HGB BLD-MCNC: 11.6 G/DL (ref 12–16)
IMM GRANULOCYTES # BLD AUTO: 0.02 K/UL (ref 0–0.11)
IMM GRANULOCYTES NFR BLD AUTO: 0.2 % (ref 0–0.9)
LYMPHOCYTES # BLD AUTO: 1.71 K/UL (ref 1–4.8)
LYMPHOCYTES NFR BLD: 19.7 % (ref 22–41)
MCH RBC QN AUTO: 27.4 PG (ref 27–33)
MCHC RBC AUTO-ENTMCNC: 30.6 G/DL (ref 32.2–35.5)
MCV RBC AUTO: 89.4 FL (ref 81.4–97.8)
MONOCYTES # BLD AUTO: 0.5 K/UL (ref 0–0.85)
MONOCYTES NFR BLD AUTO: 5.8 % (ref 0–13.4)
NEUTROPHILS # BLD AUTO: 6.31 K/UL (ref 1.82–7.42)
NEUTROPHILS NFR BLD: 72.7 % (ref 44–72)
NRBC # BLD AUTO: 0 K/UL
NRBC BLD-RTO: 0 /100 WBC (ref 0–0.2)
PLATELET # BLD AUTO: 207 K/UL (ref 164–446)
PMV BLD AUTO: 11.4 FL (ref 9–12.9)
POTASSIUM SERPL-SCNC: 4.5 MMOL/L (ref 3.6–5.5)
PROT SERPL-MCNC: 6.1 G/DL (ref 6–8.2)
RBC # BLD AUTO: 4.24 M/UL (ref 4.2–5.4)
SIGNIFICANT IND 70042: NORMAL
SITE SITE: NORMAL
SODIUM SERPL-SCNC: 144 MMOL/L (ref 135–145)
SOURCE SOURCE: NORMAL
WBC # BLD AUTO: 8.7 K/UL (ref 4.8–10.8)

## 2024-08-11 PROCEDURE — G0378 HOSPITAL OBSERVATION PER HR: HCPCS

## 2024-08-11 PROCEDURE — 700102 HCHG RX REV CODE 250 W/ 637 OVERRIDE(OP): Mod: UD

## 2024-08-11 PROCEDURE — RXMED WILLOW AMBULATORY MEDICATION CHARGE: Performed by: HOSPITALIST

## 2024-08-11 PROCEDURE — 700111 HCHG RX REV CODE 636 W/ 250 OVERRIDE (IP): Mod: UD

## 2024-08-11 PROCEDURE — 87205 SMEAR GRAM STAIN: CPT

## 2024-08-11 PROCEDURE — 700102 HCHG RX REV CODE 250 W/ 637 OVERRIDE(OP): Mod: UD | Performed by: HOSPITALIST

## 2024-08-11 PROCEDURE — 36415 COLL VENOUS BLD VENIPUNCTURE: CPT

## 2024-08-11 PROCEDURE — A9270 NON-COVERED ITEM OR SERVICE: HCPCS | Mod: UD

## 2024-08-11 PROCEDURE — 85025 COMPLETE CBC W/AUTO DIFF WBC: CPT

## 2024-08-11 PROCEDURE — 87070 CULTURE OTHR SPECIMN AEROBIC: CPT

## 2024-08-11 PROCEDURE — 80053 COMPREHEN METABOLIC PANEL: CPT

## 2024-08-11 PROCEDURE — A9270 NON-COVERED ITEM OR SERVICE: HCPCS | Mod: UD | Performed by: HOSPITALIST

## 2024-08-11 PROCEDURE — 99239 HOSP IP/OBS DSCHRG MGMT >30: CPT | Performed by: HOSPITALIST

## 2024-08-11 PROCEDURE — 96376 TX/PRO/DX INJ SAME DRUG ADON: CPT

## 2024-08-11 RX ORDER — MECLIZINE HYDROCHLORIDE 25 MG/1
25 TABLET ORAL 3 TIMES DAILY PRN
Status: DISCONTINUED | OUTPATIENT
Start: 2024-08-11 | End: 2024-08-11 | Stop reason: HOSPADM

## 2024-08-11 RX ORDER — MECLIZINE HYDROCHLORIDE 25 MG/1
25 TABLET ORAL 3 TIMES DAILY PRN
Qty: 30 TABLET | Refills: 0 | Status: ON HOLD | OUTPATIENT
Start: 2024-08-11 | End: 2024-08-20

## 2024-08-11 RX ADMIN — GABAPENTIN 300 MG: 300 CAPSULE ORAL at 06:11

## 2024-08-11 RX ADMIN — APIXABAN 5 MG: 5 TABLET, FILM COATED ORAL at 07:20

## 2024-08-11 RX ADMIN — PANTOPRAZOLE SODIUM 40 MG: 40 INJECTION, POWDER, FOR SOLUTION INTRAVENOUS at 06:11

## 2024-08-11 RX ADMIN — ARIPIPRAZOLE 30 MG: 15 TABLET ORAL at 06:12

## 2024-08-11 RX ADMIN — METOPROLOL SUCCINATE 25 MG: 25 TABLET, EXTENDED RELEASE ORAL at 06:11

## 2024-08-11 RX ADMIN — ASPIRIN 81 MG: 81 TABLET, COATED ORAL at 06:11

## 2024-08-11 RX ADMIN — MOMETASONE FUROATE AND FORMOTEROL FUMARATE DIHYDRATE 2 PUFF: 100; 5 AEROSOL RESPIRATORY (INHALATION) at 06:11

## 2024-08-11 RX ADMIN — MONTELUKAST 10 MG: 10 TABLET, FILM COATED ORAL at 06:12

## 2024-08-11 RX ADMIN — MECLIZINE HYDROCHLORIDE 25 MG: 25 TABLET ORAL at 10:56

## 2024-08-11 ASSESSMENT — ENCOUNTER SYMPTOMS
CHILLS: 0
MUSCULOSKELETAL NEGATIVE: 1
DIZZINESS: 1
PSYCHIATRIC NEGATIVE: 1
FEVER: 0
WEIGHT LOSS: 0
EYES NEGATIVE: 1
CARDIOVASCULAR NEGATIVE: 1

## 2024-08-11 NOTE — PROGRESS NOTES
Report received from Enmanuel Glez. Assumed pt care. Pt resting in bed. Pt A&O x 4. Fall precautions in place, call light and belongings within reach, bed in lowest position. No signs of distress.

## 2024-08-11 NOTE — CARE PLAN
The patient is Stable - Low risk of patient condition declining or worsening    Shift Goals  Clinical Goals: Contact group home to d/c  Patient Goals: Snacks, rest  Family Goals: ARAM    Progress made toward(s) clinical / shift goals:    Problem: Pain - Standard  Goal: Alleviation of pain or a reduction in pain to the patient’s comfort goal  Outcome: Progressing     Problem: Discharge Barriers/Planning  Goal: Patient's continuum of care needs are met  Outcome: Progressing       Patient is not progressing towards the following goals:

## 2024-08-11 NOTE — DISCHARGE SUMMARY
Discharge Summary    CHIEF COMPLAINT ON ADMISSION  Chief Complaint   Patient presents with    Chest Pain     CHEST PAIN ASSOCIATED WITH DIZZINESS AND SHORTNESS OF BREATH SINCE 5 PM    Shortness of Breath    Dizziness       Reason for Admission  Dizziness; Afib     Admission Date  8/9/2024    CODE STATUS  Full Code    As per unr h+p    Lorene Haro is a 48 y.o. female who presented 8/9/2024 with complaints of atypical chest pain.  Past medical history of A-fib on anticoagulation, asthma, bipolar disorder type II, COPD on 2 L nasal cannula at night, hypertension, schizophrenic disorder.  Patient presented with complaints of chest pain, nauseas and epigastric pain.  Denies vomiting or diarrhea.  No fevers or chills.     In the ED, blood pressure 135/61, heart rate 68, respiratory rate 20, afebrile, SpO2 94% in 2 L nasal cannula.  Labs only remarkable for uptrending troponins from 35 to 43.  Chest x-ray showed cardiomegaly and persistent left base opacity.  Patient will be admitted for atypical chest pain and acute hypoxic respiratory failure.    The patient was monitored on telemetry.  There was no evidence of arrhythmia.  Patient had no significant abnormality noted on telemetry.  Her workup was unremarkable in the hospital still complaining of intermittent dizziness patient already has meclizine at home patient to continue meclizine as needed for dizziness patient to follow-up with PCP for further care and management      Therefore, she is discharged in good and stable condition to home with close outpatient follow-up.    The patient recovered much more quickly than anticipated on admission.    Discharge Date  8/11/2024    FOLLOW UP ITEMS POST DISCHARGE  Was consulted yesterday    DISCHARGE DIAGNOSES  Principal Problem:    Chest pain, likely musculoskeletal (POA: Yes)  Active Problems:    Tobacco abuse (POA: Yes)    ACS (acute coronary syndrome) (HCC) (POA: Yes)  Resolved Problems:    * No resolved hospital  problems. *      FOLLOW UP  Future Appointments   Date Time Provider Department Center   10/30/2024 12:00 PM PFT-RM3 [facility] None   11[facility]2024 10[facility]   11/20/2024  2:00 PM [facility] None   12/2/2024  3:40 PM [facility] None   12/6/2024  4:00 PM [facility] None     [facility] ([facility]  [address] [place] [id number]  [phone]  Schedule an appointment as soon as possible for a visit in 1 week(s)        MEDICATIONS ON DISCHARGE     Medication List        START taking these medications        Instructions   omeprazole 20 MG delayed-release capsule  Commonly known as: PriLOSEC   Take 1 Capsule by mouth 2 times a day.  Dose: 20 mg            CHANGE how you take these medications        Instructions   meclizine 25 MG Tabs  What changed: reasons to take this  Commonly known as: Antivert   Take 1 Tablet by mouth 3 times a day as needed for Vertigo or Dizziness.  Dose: 25 mg            CONTINUE taking these medications        Instructions   acetaminophen 500 MG Tabs  Commonly known as: Tylenol   Take 500-1,000 mg by mouth every 6 hours as needed for Moderate Pain.  Dose: 500-1,000 mg     Advair Diskus 100-50 MCG/ACT Aepb  Generic drug: fluticasone-salmeterol   Doctor's comments: 3 month supply x 1 year  Inhale 1 Puff 2 times a day.  Dose: 1 Puff     albuterol 108 (90 Base) MCG/ACT Aers inhalation aerosol   Inhale 2 Puffs every 6 hours as needed for Shortness of Breath.  Dose: 2 Puff     apixaban 5mg Tabs  Commonly known as: Eliquis   Take 1 Tablet by mouth 2 times a day.  Dose: 5 mg     aripiprazole 30 MG tablet  Commonly known as: Abilify   Take 1 Tablet by mouth every morning. Indications: Major Depressive Disorder  Dose: 30 mg     atorvastatin 20 MG Tabs  Commonly known as: Lipitor   Take 1 Tablet by mouth at bedtime.  Dose: 20 mg     cefdinir 300 MG Caps  Commonly known as:  Omnicef   Take 1 Capsule by mouth 2 times a day.  Dose: 300 mg     dicyclomine 20 MG Tabs  Commonly known as: Bentyl   Take 20 mg by mouth every 6 hours as needed (abdominal pain or diarrhea).  Dose: 20 mg     fluticasone 50 MCG/ACT nasal spray  Commonly known as: Flonase   Administer 2 Sprays into each nostril every morning.  Dose: 2 Spray     furosemide 20 MG Tabs  Commonly known as: Lasix   Take 20 mg by mouth 1 time a day as needed (swelling).  Dose: 20 mg     gabapentin 300 MG Caps  Commonly known as: Neurontin   Take 300 mg by mouth 3 times a day.  Dose: 300 mg     Home Care Oxygen   Inhale 2 L/min every evening.  Dose: 2 L/min     methocarbamol 750 MG Tabs  Commonly known as: Robaxin   Take 750 mg by mouth 3 times a day as needed (muscle spasms).  Dose: 750 mg     metoprolol SR 25 MG Tb24  Commonly known as: Toprol XL   Take 25 mg by mouth every day.  Dose: 25 mg     montelukast 10 MG Tabs  Commonly known as: Singulair   Doctor's comments: 3 month supply x 1 year  Take 1 Tablet by mouth every evening.  Dose: 10 mg     nicotine 7 MG/24HR Pt24  Commonly known as: Nicoderm   Place 1 Patch on the skin every 24 hours.  Dose: 1 Patch     ondansetron 4 MG Tbdp  Commonly known as: Zofran ODT   Take 4 mg by mouth every 6 hours as needed for Nausea/Vomiting.  Dose: 4 mg     traMADol 50 MG Tabs  Commonly known as: Ultram   Take 50 mg by mouth every 6 hours as needed for Moderate Pain.  Dose: 50 mg            STOP taking these medications      famotidine 20 MG Tabs  Commonly known as: Pepcid     ibuprofen 800 MG Tabs  Commonly known as: Motrin     predniSONE 20 MG Tabs  Commonly known as: Deltasone              Allergies  Allergies   Allergen Reactions    Penicillin G Rash and Itching     pt reports that she gets a rash all over her body and gets itchy    Amoxicillin Rash and Itching     Tolerates cephalosporins, pt reports that she gets a rash all over her body and gets itchy       DIET  Orders Placed This Encounter    Procedures    Diet Order Diet: Cardiac     Standing Status:   Standing     Number of Occurrences:   1     Order Specific Question:   Diet:     Answer:   Cardiac [6]       ACTIVITY  As tolerated.  Weight bearing as tolerated    COSULTATIONS      PROCEDURES      LABORATORY  Lab Results   Component Value Date    SODIUM 144 08/11/2024    POTASSIUM 4.5 08/11/2024    CHLORIDE 106 08/11/2024    CO2 27 08/11/2024    GLUCOSE 130 (H) 08/11/2024    BUN 13 08/11/2024    CREATININE 1.03 08/11/2024    CREATININE 0.9 09/11/2008        Lab Results   Component Value Date    WBC 8.7 08/11/2024    HEMOGLOBIN 11.6 (L) 08/11/2024    HEMATOCRIT 37.9 08/11/2024    PLATELETCT 207 08/11/2024        Total time of the discharge process exceeds 37 minutes.

## 2024-08-11 NOTE — PROGRESS NOTES
Pt is informed regarding discharge,pt says she is weak to go home,pt ambulated to bathroom with walker SBA.Good food and fluid intake,o2 sat on RA is 93 %,pt needs oxygen when she sleeps.SW informed and received CAB voucher,Group home called and no answer,left message.

## 2024-08-11 NOTE — DISCHARGE PLANNING
Case Management Discharge Planning    Admission Date: 8/9/2024  GMLOS:    ALOS: 0    6-Clicks ADL Score:    6-Clicks Mobility Score:        Anticipated Discharge Dispo:      DME Needed: No    Action(s) Taken: OTHER    @ 0908: KETTY attempted to call owner of  where pt resides ([place]), Shirley, [phone]. Left a  requesting call back.    @ 0921: KETTY again attempted to call Shirley in regards to pt being dc'd today. Left another VM requesting a callback. KETTY called pt's sister, Kylah, to inform of pt's discharge to . Pt's brother-in-law Rodney answered. Verbalized understanding of pt dc. Will inform pt's sister once she wakes up.    @ 1027: KETTY attempted for a third time to call Shirley in regards to pt being dc'd today. Left a VM requesting callback.     Escalations Completed: None    Medically Clear: Yes

## 2024-08-11 NOTE — PROGRESS NOTES
Hospital Medicine Daily Progress Note    Date of Service  8/11/2024    Chief Complaint  Lorene Haro is a 48 y.o. female admitted 8/9/2024 with chest pain and dizziness    Hospital Course      Interval Problem Update  Chest pain resolved    Troponins negative    Still complaining of intermittent dizziness    This is a chronic complaint    Continue to monitor  I have discussed this patient's plan of care and discharge plan at IDT rounds today with Case Management, Nursing, Nursing leadership, and other members of the IDT team.    Consultants/Specialty      Code Status  Full Code    Disposition  The patient is medically cleared for discharge to home or a post-acute facility.  Anticipate discharge to: home with close outpatient follow-up    I have placed the appropriate orders for post-discharge needs.    Review of Systems  Review of Systems   Constitutional:  Positive for malaise/fatigue. Negative for chills, fever and weight loss.   Eyes: Negative.    Cardiovascular: Negative.    Genitourinary: Negative.    Musculoskeletal: Negative.    Neurological:  Positive for dizziness.   Psychiatric/Behavioral: Negative.          Physical Exam  Temp:  [36.4 °C (97.6 °F)-36.8 °C (98.2 °F)] 36.8 °C (98.2 °F)  Pulse:  [65-68] 67  Resp:  [18] 18  BP: ()/(50-65) 108/65  SpO2:  [94 %] 94 %    Physical Exam  Constitutional:       Appearance: She is obese. She is not diaphoretic.   HENT:      Head: Atraumatic.      Mouth/Throat:      Mouth: Mucous membranes are moist.   Eyes:      Extraocular Movements: Extraocular movements intact.   Cardiovascular:      Rate and Rhythm: Normal rate and regular rhythm.   Pulmonary:      Effort: No respiratory distress.      Breath sounds: Rhonchi present. No wheezing.   Abdominal:      General: There is no distension.      Palpations: There is no mass.      Tenderness: There is no abdominal tenderness.   Musculoskeletal:         General: Normal range of motion.      Cervical back: Normal  range of motion and neck supple.      Right lower leg: Edema present.      Left lower leg: Edema present.   Skin:     Capillary Refill: Capillary refill takes 2 to 3 seconds.   Neurological:      General: No focal deficit present.      Mental Status: She is oriented to person, place, and time.      Cranial Nerves: No cranial nerve deficit.      Motor: No weakness.      Gait: Gait normal.   Psychiatric:         Mood and Affect: Mood normal.         Behavior: Behavior normal.         Fluids    Intake/Output Summary (Last 24 hours) at 8/11/2024 1225  Last data filed at 8/11/2024 0814  Gross per 24 hour   Intake 720 ml   Output --   Net 720 ml       Laboratory  Recent Labs     08/09/24 2031 08/11/24  0050   WBC 10.7 8.7   RBC 4.65 4.24   HEMOGLOBIN 12.9 11.6*   HEMATOCRIT 41.8 37.9   MCV 89.9 89.4   MCH 27.7 27.4   MCHC 30.9* 30.6*   RDW 53.9* 53.9*   PLATELETCT 232 207   MPV 11.2 11.4     Recent Labs     08/09/24 2031 08/11/24  0050   SODIUM 141 144   POTASSIUM 4.2 4.5   CHLORIDE 103 106   CO2 24 27   GLUCOSE 164* 130*   BUN 14 13   CREATININE 0.97 1.03   CALCIUM 9.4 9.0                   Imaging  NM-CARDIAC STRESS TEST   Final Result      DX-CHEST-PORTABLE (1 VIEW)   Final Result      1.  Cardiomegaly.   2.  Persistent left base opacity, possibly pneumonia.           Assessment/Plan  * Chest pain, likely musculoskeletal- (present on admission)  Assessment & Plan  Patient presented with atypical chest pain. Tender to touch at costosternal junctions make most likely costochondritis diagnosis.  Elevated troponins from 35-43.  But also patient referred epigastric pain with some acid reflux and nauseous.  -Holding NSAIDs for now since some GI component  -GI cocktail        ACS (acute coronary syndrome) (HCC)- (present on admission)  Assessment & Plan  Patient came with chest pain and trended up troponins from 35 to 43.  -Aspirin 81 mg  -High intensity statin    -On telemetry and continuous pulse ox      Tobacco abuse-  (present on admission)  Assessment & Plan  Patient advised to stop smoking.          VTE prophylaxis: VTE Selection    I have performed a physical exam and reviewed and updated ROS and Plan today (8/11/2024). In review of yesterday's note (8/10/2024), there are no changes except as documented above.    Greater than 37 minutes spent prepping to see patient (e.g. review of tests) obtaining and/or reviewing separately obtained history. Performing a medically appropriate examination and/ evaluation.  Counseling and educating the patient/family/caregiver.  Ordering medications, tests, or procedures.  Referring and communicating with other health care professionals.  Documenting clinical information in EPIC.  Independently interpreting results and communicating results to patient/family/caregiver.  Care coordination.

## 2024-08-11 NOTE — PROGRESS NOTES
Telemetry Shift Summary    Rhythm SR  HR Range 63-70   Ectopy R PVC's  Measurements 0.15/0.07/0.40                  Normal Values  Rhythm SR  HR Range    Measurements 0.12-0.20 / 0.06-0.10  / 0.30-0.52

## 2024-08-11 NOTE — PROGRESS NOTES
Sputum culture sent,pt for discharge,medicines given,educated on new medicine Meclizine,pt discharged by cab safe to her group home,pt knows address,awake,a&ox4,all belongings sent with her and her walker,discharged without any events.

## 2024-08-11 NOTE — PROGRESS NOTES
Pt for discharge,per patient no ride available,Group home staff called multiple times,message left to call back,ER CM called and left message,NP Nimisha is informed the difficulty in discharging Lorene to Group [place].

## 2024-08-13 LAB
BACTERIA SPEC RESP CULT: NORMAL
GRAM STN SPEC: NORMAL
SIGNIFICANT IND 70042: NORMAL
SITE SITE: NORMAL
SOURCE SOURCE: NORMAL

## 2024-08-19 ENCOUNTER — APPOINTMENT (OUTPATIENT)
Dept: RADIOLOGY | Facility: MEDICAL CENTER | Age: 49
End: 2024-08-19
Attending: INTERNAL MEDICINE
Payer: MEDICAID

## 2024-08-19 ENCOUNTER — APPOINTMENT (OUTPATIENT)
Dept: RADIOLOGY | Facility: MEDICAL CENTER | Age: 49
End: 2024-08-19
Attending: EMERGENCY MEDICINE
Payer: MEDICAID

## 2024-08-19 ENCOUNTER — HOSPITAL ENCOUNTER (OUTPATIENT)
Facility: MEDICAL CENTER | Age: 49
End: 2024-08-20
Attending: EMERGENCY MEDICINE | Admitting: INTERNAL MEDICINE
Payer: MEDICAID

## 2024-08-19 DIAGNOSIS — R42 VERTIGO: ICD-10-CM

## 2024-08-19 DIAGNOSIS — I51.7 CARDIOMEGALY: ICD-10-CM

## 2024-08-19 DIAGNOSIS — H81.13 BENIGN PAROXYSMAL POSITIONAL VERTIGO DUE TO BILATERAL VESTIBULAR DISORDER: ICD-10-CM

## 2024-08-19 LAB
ALBUMIN SERPL BCP-MCNC: 3.7 G/DL (ref 3.2–4.9)
ALBUMIN/GLOB SERPL: 1.3 G/DL
ALP SERPL-CCNC: 132 U/L (ref 30–99)
ALT SERPL-CCNC: 14 U/L (ref 2–50)
ANION GAP SERPL CALC-SCNC: 11 MMOL/L (ref 7–16)
AST SERPL-CCNC: 10 U/L (ref 12–45)
BASOPHILS # BLD AUTO: 0.2 % (ref 0–1.8)
BASOPHILS # BLD: 0.02 K/UL (ref 0–0.12)
BILIRUB SERPL-MCNC: 0.7 MG/DL (ref 0.1–1.5)
BUN SERPL-MCNC: 11 MG/DL (ref 8–22)
CALCIUM ALBUM COR SERPL-MCNC: 9.2 MG/DL (ref 8.5–10.5)
CALCIUM SERPL-MCNC: 9 MG/DL (ref 8.5–10.5)
CHLORIDE SERPL-SCNC: 107 MMOL/L (ref 96–112)
CO2 SERPL-SCNC: 25 MMOL/L (ref 20–33)
CREAT SERPL-MCNC: 0.81 MG/DL (ref 0.5–1.4)
EKG IMPRESSION: NORMAL
EOSINOPHIL # BLD AUTO: 0.07 K/UL (ref 0–0.51)
EOSINOPHIL NFR BLD: 0.7 % (ref 0–6.9)
ERYTHROCYTE [DISTWIDTH] IN BLOOD BY AUTOMATED COUNT: 52.9 FL (ref 35.9–50)
GFR SERPLBLD CREATININE-BSD FMLA CKD-EPI: 88 ML/MIN/1.73 M 2
GLOBULIN SER CALC-MCNC: 2.8 G/DL (ref 1.9–3.5)
GLUCOSE SERPL-MCNC: 129 MG/DL (ref 65–99)
HCT VFR BLD AUTO: 40.4 % (ref 37–47)
HGB BLD-MCNC: 12.4 G/DL (ref 12–16)
IMM GRANULOCYTES # BLD AUTO: 0.04 K/UL (ref 0–0.11)
IMM GRANULOCYTES NFR BLD AUTO: 0.4 % (ref 0–0.9)
LYMPHOCYTES # BLD AUTO: 1.54 K/UL (ref 1–4.8)
LYMPHOCYTES NFR BLD: 15.5 % (ref 22–41)
MCH RBC QN AUTO: 27.4 PG (ref 27–33)
MCHC RBC AUTO-ENTMCNC: 30.7 G/DL (ref 32.2–35.5)
MCV RBC AUTO: 89.4 FL (ref 81.4–97.8)
MONOCYTES # BLD AUTO: 0.51 K/UL (ref 0–0.85)
MONOCYTES NFR BLD AUTO: 5.1 % (ref 0–13.4)
NEUTROPHILS # BLD AUTO: 7.75 K/UL (ref 1.82–7.42)
NEUTROPHILS NFR BLD: 78.1 % (ref 44–72)
NRBC # BLD AUTO: 0 K/UL
NRBC BLD-RTO: 0 /100 WBC (ref 0–0.2)
PLATELET # BLD AUTO: 219 K/UL (ref 164–446)
PMV BLD AUTO: 11.4 FL (ref 9–12.9)
POTASSIUM SERPL-SCNC: 4 MMOL/L (ref 3.6–5.5)
PROT SERPL-MCNC: 6.5 G/DL (ref 6–8.2)
RBC # BLD AUTO: 4.52 M/UL (ref 4.2–5.4)
SODIUM SERPL-SCNC: 143 MMOL/L (ref 135–145)
TROPONIN T SERPL-MCNC: 31 NG/L (ref 6–19)
WBC # BLD AUTO: 9.9 K/UL (ref 4.8–10.8)

## 2024-08-19 PROCEDURE — 92523 SPEECH SOUND LANG COMPREHEN: CPT

## 2024-08-19 PROCEDURE — 700111 HCHG RX REV CODE 636 W/ 250 OVERRIDE (IP): Mod: UD | Performed by: EMERGENCY MEDICINE

## 2024-08-19 PROCEDURE — 93005 ELECTROCARDIOGRAM TRACING: CPT | Performed by: EMERGENCY MEDICINE

## 2024-08-19 PROCEDURE — G0378 HOSPITAL OBSERVATION PER HR: HCPCS

## 2024-08-19 PROCEDURE — 85025 COMPLETE CBC W/AUTO DIFF WBC: CPT

## 2024-08-19 PROCEDURE — 99497 ADVNCD CARE PLAN 30 MIN: CPT | Performed by: INTERNAL MEDICINE

## 2024-08-19 PROCEDURE — 700102 HCHG RX REV CODE 250 W/ 637 OVERRIDE(OP): Mod: UD | Performed by: INTERNAL MEDICINE

## 2024-08-19 PROCEDURE — 84484 ASSAY OF TROPONIN QUANT: CPT

## 2024-08-19 PROCEDURE — 80053 COMPREHEN METABOLIC PANEL: CPT

## 2024-08-19 PROCEDURE — 99285 EMERGENCY DEPT VISIT HI MDM: CPT

## 2024-08-19 PROCEDURE — 36415 COLL VENOUS BLD VENIPUNCTURE: CPT

## 2024-08-19 PROCEDURE — 71045 X-RAY EXAM CHEST 1 VIEW: CPT

## 2024-08-19 PROCEDURE — 99223 1ST HOSP IP/OBS HIGH 75: CPT | Mod: 25 | Performed by: INTERNAL MEDICINE

## 2024-08-19 PROCEDURE — 70450 CT HEAD/BRAIN W/O DYE: CPT

## 2024-08-19 PROCEDURE — A9270 NON-COVERED ITEM OR SERVICE: HCPCS | Mod: UD | Performed by: INTERNAL MEDICINE

## 2024-08-19 PROCEDURE — 700105 HCHG RX REV CODE 258: Mod: UD | Performed by: INTERNAL MEDICINE

## 2024-08-19 PROCEDURE — 92610 EVALUATE SWALLOWING FUNCTION: CPT

## 2024-08-19 PROCEDURE — 96374 THER/PROPH/DIAG INJ IV PUSH: CPT

## 2024-08-19 PROCEDURE — 70551 MRI BRAIN STEM W/O DYE: CPT

## 2024-08-19 RX ORDER — FLUTICASONE PROPIONATE AND SALMETEROL 100; 50 UG/1; UG/1
1 POWDER RESPIRATORY (INHALATION) 2 TIMES DAILY
Status: DISCONTINUED | OUTPATIENT
Start: 2024-08-19 | End: 2024-08-19

## 2024-08-19 RX ORDER — ONDANSETRON 4 MG/1
4 TABLET, ORALLY DISINTEGRATING ORAL EVERY 4 HOURS PRN
Status: DISCONTINUED | OUTPATIENT
Start: 2024-08-19 | End: 2024-08-20 | Stop reason: HOSPADM

## 2024-08-19 RX ORDER — PROCHLORPERAZINE EDISYLATE 5 MG/ML
5-10 INJECTION INTRAMUSCULAR; INTRAVENOUS EVERY 4 HOURS PRN
Status: DISCONTINUED | OUTPATIENT
Start: 2024-08-19 | End: 2024-08-20 | Stop reason: HOSPADM

## 2024-08-19 RX ORDER — SODIUM CHLORIDE, SODIUM LACTATE, POTASSIUM CHLORIDE, CALCIUM CHLORIDE 600; 310; 30; 20 MG/100ML; MG/100ML; MG/100ML; MG/100ML
INJECTION, SOLUTION INTRAVENOUS CONTINUOUS
Status: DISCONTINUED | OUTPATIENT
Start: 2024-08-19 | End: 2024-08-20 | Stop reason: HOSPADM

## 2024-08-19 RX ORDER — ARIPIPRAZOLE 10 MG/1
30 TABLET ORAL EVERY MORNING
Status: DISCONTINUED | OUTPATIENT
Start: 2024-08-20 | End: 2024-08-20 | Stop reason: HOSPADM

## 2024-08-19 RX ORDER — ENOXAPARIN SODIUM 100 MG/ML
40 INJECTION SUBCUTANEOUS DAILY
Status: DISCONTINUED | OUTPATIENT
Start: 2024-08-19 | End: 2024-08-19

## 2024-08-19 RX ORDER — DIAZEPAM 10 MG/2ML
2.5 INJECTION, SOLUTION INTRAMUSCULAR; INTRAVENOUS ONCE
Status: COMPLETED | OUTPATIENT
Start: 2024-08-19 | End: 2024-08-19

## 2024-08-19 RX ORDER — PROMETHAZINE HYDROCHLORIDE 25 MG/1
12.5-25 SUPPOSITORY RECTAL EVERY 4 HOURS PRN
Status: DISCONTINUED | OUTPATIENT
Start: 2024-08-19 | End: 2024-08-20 | Stop reason: HOSPADM

## 2024-08-19 RX ORDER — MONTELUKAST SODIUM 10 MG/1
10 TABLET ORAL EVERY EVENING
Status: DISCONTINUED | OUTPATIENT
Start: 2024-08-20 | End: 2024-08-20 | Stop reason: HOSPADM

## 2024-08-19 RX ORDER — ALBUTEROL SULFATE 90 UG/1
2 AEROSOL, METERED RESPIRATORY (INHALATION) EVERY 6 HOURS PRN
Status: DISCONTINUED | OUTPATIENT
Start: 2024-08-19 | End: 2024-08-20 | Stop reason: HOSPADM

## 2024-08-19 RX ORDER — GABAPENTIN 300 MG/1
300 CAPSULE ORAL 3 TIMES DAILY
Status: DISCONTINUED | OUTPATIENT
Start: 2024-08-19 | End: 2024-08-20 | Stop reason: HOSPADM

## 2024-08-19 RX ORDER — MECLIZINE HYDROCHLORIDE 25 MG/1
25 TABLET ORAL 3 TIMES DAILY PRN
Status: DISCONTINUED | OUTPATIENT
Start: 2024-08-19 | End: 2024-08-20 | Stop reason: HOSPADM

## 2024-08-19 RX ORDER — ATORVASTATIN CALCIUM 20 MG/1
20 TABLET, FILM COATED ORAL
Status: DISCONTINUED | OUTPATIENT
Start: 2024-08-19 | End: 2024-08-20 | Stop reason: HOSPADM

## 2024-08-19 RX ORDER — LIDOCAINE 50 MG/G
1 PATCH TOPICAL EVERY 12 HOURS
COMMUNITY

## 2024-08-19 RX ORDER — LABETALOL HYDROCHLORIDE 5 MG/ML
10 INJECTION, SOLUTION INTRAVENOUS EVERY 4 HOURS PRN
Status: DISCONTINUED | OUTPATIENT
Start: 2024-08-19 | End: 2024-08-20 | Stop reason: HOSPADM

## 2024-08-19 RX ORDER — ONDANSETRON 2 MG/ML
4 INJECTION INTRAMUSCULAR; INTRAVENOUS EVERY 4 HOURS PRN
Status: DISCONTINUED | OUTPATIENT
Start: 2024-08-19 | End: 2024-08-20 | Stop reason: HOSPADM

## 2024-08-19 RX ORDER — METOPROLOL SUCCINATE 25 MG/1
25 TABLET, EXTENDED RELEASE ORAL DAILY
Status: DISCONTINUED | OUTPATIENT
Start: 2024-08-20 | End: 2024-08-20 | Stop reason: HOSPADM

## 2024-08-19 RX ORDER — METHOCARBAMOL 750 MG/1
750 TABLET, FILM COATED ORAL 3 TIMES DAILY PRN
Status: DISCONTINUED | OUTPATIENT
Start: 2024-08-19 | End: 2024-08-20 | Stop reason: HOSPADM

## 2024-08-19 RX ORDER — PROMETHAZINE HYDROCHLORIDE 25 MG/1
12.5-25 TABLET ORAL EVERY 4 HOURS PRN
Status: DISCONTINUED | OUTPATIENT
Start: 2024-08-19 | End: 2024-08-20 | Stop reason: HOSPADM

## 2024-08-19 RX ORDER — ACETAMINOPHEN 325 MG/1
650 TABLET ORAL EVERY 6 HOURS PRN
Status: DISCONTINUED | OUTPATIENT
Start: 2024-08-19 | End: 2024-08-20 | Stop reason: HOSPADM

## 2024-08-19 RX ADMIN — OMEPRAZOLE 20 MG: 20 CAPSULE, DELAYED RELEASE ORAL at 17:25

## 2024-08-19 RX ADMIN — SODIUM CHLORIDE, POTASSIUM CHLORIDE, SODIUM LACTATE AND CALCIUM CHLORIDE: 600; 310; 30; 20 INJECTION, SOLUTION INTRAVENOUS at 17:32

## 2024-08-19 RX ADMIN — MOMETASONE FUROATE AND FORMOTEROL FUMARATE DIHYDRATE 2 PUFF: 100; 5 AEROSOL RESPIRATORY (INHALATION) at 17:25

## 2024-08-19 RX ADMIN — APIXABAN 5 MG: 5 TABLET, FILM COATED ORAL at 17:25

## 2024-08-19 RX ADMIN — GABAPENTIN 300 MG: 300 CAPSULE ORAL at 17:25

## 2024-08-19 RX ADMIN — ATORVASTATIN CALCIUM 20 MG: 20 TABLET, FILM COATED ORAL at 20:48

## 2024-08-19 RX ADMIN — METHOCARBAMOL 750 MG: 750 TABLET ORAL at 20:48

## 2024-08-19 RX ADMIN — ACETAMINOPHEN 650 MG: 325 TABLET ORAL at 17:25

## 2024-08-19 RX ADMIN — DIAZEPAM 2.5 MG: 5 INJECTION INTRAMUSCULAR; INTRAVENOUS at 12:58

## 2024-08-19 SDOH — ECONOMIC STABILITY: TRANSPORTATION INSECURITY
IN THE PAST 12 MONTHS, HAS THE LACK OF TRANSPORTATION KEPT YOU FROM MEDICAL APPOINTMENTS OR FROM GETTING MEDICATIONS?: PATIENT DECLINED

## 2024-08-19 SDOH — ECONOMIC STABILITY: TRANSPORTATION INSECURITY
IN THE PAST 12 MONTHS, HAS LACK OF RELIABLE TRANSPORTATION KEPT YOU FROM MEDICAL APPOINTMENTS, MEETINGS, WORK OR FROM GETTING THINGS NEEDED FOR DAILY LIVING?: PATIENT DECLINED

## 2024-08-19 ASSESSMENT — LIFESTYLE VARIABLES
HOW MANY TIMES IN THE PAST YEAR HAVE YOU HAD 5 OR MORE DRINKS IN A DAY: 0
TOTAL SCORE: 0
TOTAL SCORE: 0
DOES PATIENT WANT TO STOP DRINKING: NO
HAVE PEOPLE ANNOYED YOU BY CRITICIZING YOUR DRINKING: NO
TOTAL SCORE: 0
HAVE YOU EVER FELT YOU SHOULD CUT DOWN ON YOUR DRINKING: NO
EVER HAD A DRINK FIRST THING IN THE MORNING TO STEADY YOUR NERVES TO GET RID OF A HANGOVER: NO
AVERAGE NUMBER OF DAYS PER WEEK YOU HAVE A DRINK CONTAINING ALCOHOL: 0
ALCOHOL_USE: NO
EVER FELT BAD OR GUILTY ABOUT YOUR DRINKING: NO
CONSUMPTION TOTAL: NEGATIVE
ON A TYPICAL DAY WHEN YOU DRINK ALCOHOL HOW MANY DRINKS DO YOU HAVE: 0

## 2024-08-19 ASSESSMENT — SOCIAL DETERMINANTS OF HEALTH (SDOH)
IN THE PAST 12 MONTHS, HAS THE ELECTRIC, GAS, OIL, OR WATER COMPANY THREATENED TO SHUT OFF SERVICE IN YOUR HOME?: PATIENT DECLINED
WITHIN THE LAST YEAR, HAVE TO BEEN RAPED OR FORCED TO HAVE ANY KIND OF SEXUAL ACTIVITY BY YOUR PARTNER OR EX-PARTNER?: PATIENT DECLINED
WITHIN THE LAST YEAR, HAVE YOU BEEN HUMILIATED OR EMOTIONALLY ABUSED IN OTHER WAYS BY YOUR PARTNER OR EX-PARTNER?: PATIENT DECLINED
WITHIN THE PAST 12 MONTHS, YOU WORRIED THAT YOUR FOOD WOULD RUN OUT BEFORE YOU GOT THE MONEY TO BUY MORE: PATIENT DECLINED
WITHIN THE LAST YEAR, HAVE YOU BEEN AFRAID OF YOUR PARTNER OR EX-PARTNER?: PATIENT DECLINED
WITHIN THE PAST 12 MONTHS, THE FOOD YOU BOUGHT JUST DIDN'T LAST AND YOU DIDN'T HAVE MONEY TO GET MORE: PATIENT DECLINED
WITHIN THE LAST YEAR, HAVE YOU BEEN KICKED, HIT, SLAPPED, OR OTHERWISE PHYSICALLY HURT BY YOUR PARTNER OR EX-PARTNER?: PATIENT DECLINED

## 2024-08-19 ASSESSMENT — PAIN DESCRIPTION - PAIN TYPE
TYPE: ACUTE PAIN
TYPE: ACUTE PAIN

## 2024-08-19 ASSESSMENT — ENCOUNTER SYMPTOMS: DIZZINESS: 1

## 2024-08-19 ASSESSMENT — FIBROSIS 4 INDEX
FIB4 SCORE: 0.61
FIB4 SCORE: 0.53

## 2024-08-19 NOTE — PROGRESS NOTES
Pt arrived to unit via stretcher at 1523. Pt oriented to room, unit, and plan of care. Tele-monitor placed and monitor room notified. All questions answered at this time. Call light within reach; fall precautions in place.

## 2024-08-19 NOTE — ED PROVIDER NOTES
"ED Provider Note    CHIEF COMPLAINT  Chief Complaint   Patient presents with    Dizziness     Since this morning. States has been feeling unwell for the last 2 days. States this morning fell backwards, landing on buttocks, -loc -head strike. Hx afib, takes eliquis.        EXTERNAL RECORDS REVIEWED  Inpatient Notes discharge summary from August 9, 2024, patient had been noted to have mild dizziness in the hospital, previously treated with meclizine    HPI/ROS  LIMITATION TO HISTORY   Select: : None  OUTSIDE HISTORIAN(S):  None    Lorene Haro is a 48 y.o. female who presents with 2 to 3 days of vertigo.  She states motion or sitting up worsens the spinning, slightly better at rest.  Patient states \"I thought I might have had a stroke\".  Patient takes blood thinners for atrial fibrillation, states she is compliant with her medications.  No acute numbness or weakness to extremities.  She denies headache or neck pain.  Slight episode of chest pressure earlier this morning, resolved hours ago however.  Patient was admitted 2 weeks ago, discharged on August 9, there was some notation of mild dizzy symptoms while in hospital no cough, no fever.  No abdominal pain.  No dysuria or flank pain.  Patient stated she fell this morning onto her bottom, is uninjured from the fall she states.    PAST MEDICAL HISTORY   has a past medical history of A-fib ([facility]), MONI (acute kidney injury) ([facility]) (8/9/2023), Asthma, Bipolar 2 disorder ([facility]), Chickenpox, Chronic obstructive pulmonary disease (HCC), Hypertension, On home oxygen therapy, Other psychological stress, Schizophrenic disorder ([facility]), and Yeast infection of the skin (04/01/2021).    SURGICAL HISTORY   has a past surgical history that includes hysterectomy laparoscopy; colectomy; cholecystectomy; and knee arthroscopy (Left).    FAMILY HISTORY  Family History   Problem Relation Age of Onset    No Known Problems Mother     Cancer Sister        SOCIAL HISTORY  Social " "History     Tobacco Use    Smoking status: Former     Current packs/day: 0.00     Types: Cigarettes     Quit date: 10/12/2022     Years since quittin.8    Smokeless tobacco: Never   Vaping Use    Vaping status: Never Used   Substance and Sexual Activity    Alcohol use: Not Currently    Drug use: Never    Sexual activity: Not Currently       CURRENT MEDICATIONS  Home Medications    **Home medications have not yet been reviewed for this encounter**       Audit from Redirected Encounters    **Home medications have not yet been reviewed for this encounter**         ALLERGIES  Allergies   Allergen Reactions    Penicillin G Rash and Itching     pt reports that she gets a rash all over her body and gets itchy    Amoxicillin Rash and Itching     Tolerates cephalosporins, pt reports that she gets a rash all over her body and gets itchy       PHYSICAL EXAM  VITAL SIGNS: /59   Pulse 63   Temp 36.3 °C (97.4 °F) (Temporal)   Resp 20   Ht 1.549 m (5' 1\")   Wt 122 kg (270 lb)   LMP  (LMP Unknown)   SpO2 93%   BMI 51.02 kg/m²    Neurologic: Sensation normal, strength normal, speech clear.  Visual fields intact.  Slight diplopia with lateral gaze.  Pupils are equal.  Eyes: Horizontal nystagmus with lateral gaze.  Cardiac: Regular rate, irregular rhythm.  Respiratory: Clear lung sounds  GI: Abdomen is soft and nontender  Musculoskeletal: No spine tenderness      EKG/LABS  Results for orders placed or performed during the hospital encounter of 24   CBC WITH DIFFERENTIAL   Result Value Ref Range    WBC 9.9 4.8 - 10.8 K/uL    RBC 4.52 4.20 - 5.40 M/uL    Hemoglobin 12.4 12.0 - 16.0 g/dL    Hematocrit 40.4 37.0 - 47.0 %    MCV 89.4 81.4 - 97.8 fL    MCH 27.4 27.0 - 33.0 pg    MCHC 30.7 (L) 32.2 - 35.5 g/dL    RDW 52.9 (H) 35.9 - 50.0 fL    Platelet Count 219 164 - 446 K/uL    MPV 11.4 9.0 - 12.9 fL    Neutrophils-Polys 78.10 (H) 44.00 - 72.00 %    Lymphocytes 15.50 (L) 22.00 - 41.00 %    Monocytes 5.10 0.00 - " 13.40 %    Eosinophils 0.70 0.00 - 6.90 %    Basophils 0.20 0.00 - 1.80 %    Immature Granulocytes 0.40 0.00 - 0.90 %    Nucleated RBC 0.00 0.00 - 0.20 /100 WBC    Neutrophils (Absolute) 7.75 (H) 1.82 - 7.42 K/uL    Lymphs (Absolute) 1.54 1.00 - 4.80 K/uL    Monos (Absolute) 0.51 0.00 - 0.85 K/uL    Eos (Absolute) 0.07 0.00 - 0.51 K/uL    Baso (Absolute) 0.02 0.00 - 0.12 K/uL    Immature Granulocytes (abs) 0.04 0.00 - 0.11 K/uL    NRBC (Absolute) 0.00 K/uL   COMP METABOLIC PANEL   Result Value Ref Range    Sodium 143 135 - 145 mmol/L    Potassium 4.0 3.6 - 5.5 mmol/L    Chloride 107 96 - 112 mmol/L    Co2 25 20 - 33 mmol/L    Anion Gap 11.0 7.0 - 16.0    Glucose 129 (H) 65 - 99 mg/dL    Bun 11 8 - 22 mg/dL    Creatinine 0.81 0.50 - 1.40 mg/dL    Calcium 9.0 8.5 - 10.5 mg/dL    Correct Calcium 9.2 8.5 - 10.5 mg/dL    AST(SGOT) 10 (L) 12 - 45 U/L    ALT(SGPT) 14 2 - 50 U/L    Alkaline Phosphatase 132 (H) 30 - 99 U/L    Total Bilirubin 0.7 0.1 - 1.5 mg/dL    Albumin 3.7 3.2 - 4.9 g/dL    Total Protein 6.5 6.0 - 8.2 g/dL    Globulin 2.8 1.9 - 3.5 g/dL    A-G Ratio 1.3 g/dL   TROPONIN   Result Value Ref Range    Troponin T 31 (H) 6 - 19 ng/L   ESTIMATED GFR   Result Value Ref Range    GFR (CKD-EPI) 88 >60 mL/min/1.73 m 2   EKG   Result Value Ref Range    Report       [facility] Emergency Dept.    Test Date:  2024  Pt Name:    ADELINA MILLAN                Department: ER  MRN:        [id number]                      Room:       T215  Gender:     Female                       Technician: [id number]  :        1975                   Requested By:ER TRIAGE PROTOCOL  Order #:    [id number]                    Reading MD: SHARMILA PASTRANA MD    Measurements  Intervals                                Axis  Rate:       71                           P:          0  IA:         0                            QRS:        66  QRSD:       112                          T:          10  QT:         394  QTc:         429    Interpretive Statements  Atrial fibrillation  Incomplete right bundle branch block  Low voltage, precordial leads  Compared to ECG 08/09/2024 20:17:26  unchanged    Electronically Signed On 08- 19:38:06 PDT by SHARMILA PASTRANA MD       *Note: Due to a large number of results and/or encounters for the requested time period, some results have not been displayed. A complete set of results can be found in Results Review.       I have independently interpreted this EKG    RADIOLOGY/PROCEDURES   I have independently interpreted the diagnostic imaging associated with this visit and am waiting the final reading from the radiologist.   My preliminary interpretation is as follows: Chest x-ray is negative for pneumonia    Radiologist interpretation:  CT-HEAD W/O   Final Result      No acute intracranial abnormality.            DX-CHEST-PORTABLE (1 VIEW)   Final Result      No acute cardiac or pulmonary abnormalities are identified. Lung volumes are low.      MR-BRAIN-W/O    (Results Pending)       COURSE & MEDICAL DECISION MAKING    ASSESSMENT, COURSE AND PLAN  Care Narrative: Patient with chronic atrial fibrillation presents with vertigo, for the past 3 days has been unable to walk secondary to severe dizziness.  She fell today, prompting evaluation of her head with CT scan, this was a negative study.  I have discussed this case with the stroke neurologist, given the patient's risk factors, patient be admitted for MRI of the brain to rule out cerebellar stroke.  No evidence of hemorrhagic stroke on CT scan.  Patient's vertigo was slightly improved with a dose of Valium in the emergency department.  Differential diagnosis does include both central and peripheral causes of vertigo, however with her unable to walk and following, and she is on blood thinners, patient requires hospitalization for further workup.    STROKE:   Time seen 12:30 PM (Time)     National Institutes of Health (NIH) Stroke Scale   NIH Stroke  Scale    Level of Consciousness:    Ask Month and Age:    Blink Eyes and Squeeze Hands:    Best Gaze:    Visual:    Facial Palsy:    Motor, Left Arm:    Motor, Right Arm:    Motor, Left Leg:    Motor, Right Leg:    Limb Ataxia:    Sensory Loss:    Best Language:    Dysarthria:    Extinction and Inattention:      NIHSS Score:      No, this patient is not a candidate for thrombolytic therapy because symptoms are greater than 24 hours old, patient on oral blood thinner  she is not a candidate for thrombolysis          ADDITIONAL PROBLEMS MANAGED  Chest pressure: Recent workup for cardiac illness.  Greater than 4 hours after chest pressure this morning, EKG is negative.  Her troponin is slightly elevated at 31 however much improved from her baseline of 41 9 days ago.  Review of prior troponin values over the last several admissions all appear to be slightly elevated between 20 and 40.    DISPOSITION AND DISCUSSIONS  I have discussed management of the patient with the following physicians and JAY's: Stroke neurologist Dr. Rapp, also admitting hospitalist service      Decision tools and prescription drugs considered including, but not limited to: Antibiotics were not indicated, no evidence of bacterial infection .    FINAL DIAGNOSIS  1. Vertigo         Electronically signed by: Graeme Reynoso M.D., 8/19/2024 12:48 PM

## 2024-08-19 NOTE — ED NOTES
PT resides at [facility] ([phone]) and was not sent with a MAR. I called the facility and spoke to Maureen from Administration to request a MAR be faxed to me. I will update Med Rec when MAR arrives.

## 2024-08-19 NOTE — ED NOTES
MAR fax received but it is unreadable. I called [facility] [[email or web address]] ([phone]) back to go over medications verbally, but the , Maureen could not read all of the fax either. When I asked who I could call to get a verbal on PT last doses, Maureen told me she will call the  and she hung up on me.    Maureen could not verify PT allergies.    PT has an order for Cefdinir 300mg 10 day course started 07/28/2024. Last dose could not be verified.    PT is on Eliquis 5mg. Last dose was 08/19/2024 at 0700.    Preferred pharmacy for this encounter is [facility] in [place] ([phone])

## 2024-08-19 NOTE — ASSESSMENT & PLAN NOTE
I discussed advance care planning for at least 17 minutes with the patient including diagnosis of A-fib, schizophrenia, asthma, COPD, morbid obesity, chronic respiratory failure, vertigo and possible stroke.  I discussed her prognosis, plan of care, risks and benefits of any therapies that could be offered, as well as alternatives including palliation. The patient has opted to remain a full code and would like full medical therapy at this time.  She would like to be resuscitated with chest compressions and intubation if necessary. Time spent is exclusive of evaluation and management or other separately billable procedures.

## 2024-08-19 NOTE — ED NOTES
Pt placed on 2L NC due to sats dropping to high 80s while sleeping, pt uses 2L at home PRN and at night. Pt transported to CDU by RN on monitoring, with chart and all belongings including personal walker

## 2024-08-19 NOTE — H&P
Hospital Medicine History & Physical Note    Date of Service  8/19/2024    Primary Care Physician  Pcp Unknown    Consultants  None    Code Status  Full Code    Chief Complaint  Chief Complaint   Patient presents with    Dizziness     Since this morning. States has been feeling unwell for the last 2 days. States this morning fell backwards, landing on buttocks, -loc -head strike. Hx afib, takes eliquis.        History of Presenting Illness  Lorene Haro is a 48 y.o. female with a past medical history of atrial fibrillation on Eliquis, bipolar 2 disorder, COPD, chronic respiratory failure on 3 L of oxygen, hypertension, obesity, schizophrenia who presented 8/19/2024 with sudden onset of dizziness that started earlier today.  The patient states she has been feeling unwell for the past 2 days with symptoms of nausea and poor oral intake.  Today she developed sudden onset of dizziness and vertigo.  She also felt like she was going to pass out.  She felt unsteady and was unable to ambulate due to the symptoms.  She also fell landing backwards but denied any head injury or loss of consciousness.  She states she has been compliant with her medications.  She denies any alcohol use.  Patient also has been reporting some dysphagia since her symptoms started  In the ER her vitals were stable  CT head without contrast interpreted by me reveals no acute intracranial process  EKG interpreted by me reveals atrial fibrillation with a heart rate of 71 and incomplete right bundle branch block  Chest x-ray interpreted by me reveals no acute cardiopulmonary process    I discussed the plan of care with patient, bedside RN, and ERP .    Review of Systems  Review of Systems   Constitutional:  Positive for malaise/fatigue.   Neurological:  Positive for dizziness.       Past Medical History   has a past medical history of A-fib ([facility]), MONI (acute kidney injury) ([facility]) (8/9/2023), Asthma, Bipolar 2 disorder ([facility]), Chickenpox,  Chronic obstructive pulmonary disease (HCC), Hypertension, On home oxygen therapy, Other psychological stress, Schizophrenic disorder (HCC), and Yeast infection of the skin (04/01/2021).    Surgical History   has a past surgical history that includes hysterectomy laparoscopy; colectomy; cholecystectomy; and knee arthroscopy (Left).     Family History  family history includes Cancer in her sister; No Known Problems in her mother.   Family history reviewed with patient. There is no family history that is pertinent to the chief complaint.     Social History   reports that she quit smoking about 22 months ago. Her smoking use included cigarettes. She has never used smokeless tobacco. She reports that she does not currently use alcohol. She reports that she does not use drugs.    Allergies  Allergies   Allergen Reactions    Penicillin G Rash and Itching     pt reports that she gets a rash all over her body and gets itchy    Amoxicillin Rash and Itching     Tolerates cephalosporins, pt reports that she gets a rash all over her body and gets itchy       Medications  Prior to Admission Medications   Prescriptions Last Dose Informant Patient Reported? Taking?   ADVAIR DISKUS 100-50 MCG/ACT AEROSOL POWDER, BREATH ACTIVATED  MAR from Other Facility No No   Sig: Inhale 1 Puff 2 times a day.   Home Care Oxygen SUPPLY at SUPPLY Patient Yes No   Sig: Inhale 2 L/min every evening.   acetaminophen (TYLENOL) 500 MG Tab  MAR from Other Facility Yes No   Sig: Take 500-1,000 mg by mouth every 6 hours as needed for Moderate Pain.   albuterol 108 (90 Base) MCG/ACT Aero Soln inhalation aerosol  MAR from Other Facility No No   Sig: Inhale 2 Puffs every 6 hours as needed for Shortness of Breath.   apixaban (ELIQUIS) 5mg Tab  MAR from Other Facility No No   Sig: Take 1 Tablet by mouth 2 times a day.   aripiprazole (ABILIFY) 30 MG tablet  MAR from Other Facility No No   Sig: Take 1 Tablet by mouth every morning. Indications: Major Depressive  Disorder   atorvastatin (LIPITOR) 20 MG Tab  MAR from Other Facility No No   Sig: Take 1 Tablet by mouth at bedtime.   cefdinir (OMNICEF) 300 MG Cap  MAR from Other Facility No No   Sig: Take 1 Capsule by mouth 2 times a day.   Patient taking differently: Take 300 mg by mouth 2 times a day. 10 day course started 7/28/2024   dicyclomine (BENTYL) 20 MG Tab  MAR from Other Facility Yes No   Sig: Take 20 mg by mouth every 6 hours as needed (abdominal pain or diarrhea).   fluticasone (FLONASE) 50 MCG/ACT nasal spray  MAR from Other Facility Yes No   Sig: Administer 2 Sprays into each nostril every morning.   furosemide (LASIX) 20 MG Tab  MAR from Other Facility Yes No   Sig: Take 20 mg by mouth 1 time a day as needed (swelling).   gabapentin (NEURONTIN) 300 MG Cap  MAR from Other Facility Yes No   Sig: Take 300 mg by mouth 3 times a day.   meclizine (ANTIVERT) 25 MG Tab   No No   Sig: Take 1 Tablet by mouth 3 times a day as needed for Vertigo or Dizziness.   methocarbamol (ROBAXIN) 750 MG Tab  MAR from Other Facility Yes No   Sig: Take 750 mg by mouth 3 times a day as needed (muscle spasms).   metoprolol SR (TOPROL XL) 25 MG TABLET SR 24 HR  MAR from Other Facility Yes No   Sig: Take 25 mg by mouth every day.   montelukast (SINGULAIR) 10 MG Tab  MAR from Other Facility No No   Sig: Take 1 Tablet by mouth every evening.   nicotine (NICODERM) 7 MG/24HR PATCH 24 HR  MAR from Other Facility Yes No   Sig: Place 1 Patch on the skin every 24 hours.   omeprazole (PRILOSEC) 20 MG delayed-release capsule   No No   Sig: Take 1 Capsule by mouth 2 times a day.   ondansetron (ZOFRAN ODT) 4 MG TABLET DISPERSIBLE  MAR from Other Facility Yes No   Sig: Take 4 mg by mouth every 6 hours as needed for Nausea/Vomiting.   traMADol (ULTRAM) 50 MG Tab  MAR from Other Facility Yes No   Sig: Take 50 mg by mouth every 6 hours as needed for Moderate Pain.      Facility-Administered Medications: None       Physical Exam  Temp:  [36.3 °C (97.4 °F)]  "36.3 °C (97.4 °F)  Pulse:  [60-63] 62  Resp:  [12-20] 15  BP: (115-123)/(54-67) 115/59  SpO2:  [90 %-93 %] 90 %  Blood Pressure: 115/59   Temperature: 36.3 °C (97.4 °F)   Pulse: 62   Respiration: 15   Pulse Oximetry: 90 %       Physical Exam  Vitals and nursing note reviewed.   Constitutional:       General: She is not in acute distress.  HENT:      Head: Normocephalic.      Mouth/Throat:      Mouth: Mucous membranes are moist.   Eyes:      Pupils: Pupils are equal, round, and reactive to light.   Cardiovascular:      Rate and Rhythm: Normal rate and regular rhythm.      Pulses: Normal pulses.      Heart sounds: Normal heart sounds.   Pulmonary:      Effort: Pulmonary effort is normal.      Breath sounds: Normal breath sounds.   Abdominal:      Palpations: Abdomen is soft.      Tenderness: There is no abdominal tenderness.   Musculoskeletal:         General: No swelling.      Cervical back: Neck supple.   Skin:     General: Skin is warm.      Coloration: Skin is not jaundiced.   Neurological:      General: No focal deficit present.      Mental Status: She is alert and oriented to person, place, and time.      Comments: Generalized weakness  No focal deficits   Psychiatric:         Mood and Affect: Mood normal.         Behavior: Behavior normal.         Laboratory:  Recent Labs     08/19/24  1232   WBC 9.9   RBC 4.52   HEMOGLOBIN 12.4   HEMATOCRIT 40.4   MCV 89.4   MCH 27.4   MCHC 30.7*   RDW 52.9*   PLATELETCT 219   MPV 11.4     Recent Labs     08/19/24  1232   SODIUM 143   POTASSIUM 4.0   CHLORIDE 107   CO2 25   GLUCOSE 129*   BUN 11   CREATININE 0.81   CALCIUM 9.0     Recent Labs     08/19/24  1232   ALTSGPT 14   ASTSGOT 10*   ALKPHOSPHAT 132*   TBILIRUBIN 0.7   GLUCOSE 129*         No results for input(s): \"NTPROBNP\" in the last 72 hours.      Recent Labs     08/19/24  1232   TROPONINT 31*       Imaging:  CT-HEAD W/O   Final Result      No acute intracranial abnormality.            DX-CHEST-PORTABLE (1 VIEW) "   Final Result      No acute cardiac or pulmonary abnormalities are identified. Lung volumes are low.      MR-BRAIN-W/O    (Results Pending)           Assessment/Plan:  Justification for Admission Status  I anticipate this patient is appropriate for observation status at this time because      Patient will need a Telemetry bed on MEDICAL service .  The need is secondary to .    * Vertigo- (present on admission)  Assessment & Plan  Patient will be placed on continuous cardiac monitoring to evaluate for any arrhythmias  Q4 hours neuro checks  I will order MRI brain to rule out posterior stroke  IV fluid hydration, supportive care, IV antiemetics and meclizine as needed  PTOT and ST evaluation      Chronic respiratory failure with hypoxia (HCC)- (present on admission)  Assessment & Plan  Currently on 3 L of oxygen by nasal cannula  RT protocol    Mixed restrictive and obstructive lung disease (HCC)- (present on admission)  Assessment & Plan  Continue home regimen of inhalers    Oropharyngeal dysphagia- (present on admission)  Assessment & Plan  SLP eval     Asthma-COPD overlap syndrome (HCC)- (present on admission)  Assessment & Plan  Continue Advair, Singulair and albuterol  RT protocol    Hyperlipidemia- (present on admission)  Assessment & Plan  Continue Lipitor    Paroxysmal atrial fibrillation (HCC)- (present on admission)  Assessment & Plan  Continue Eliquis and Toprol    Morbid obesity (HCC)- (present on admission)  Assessment & Plan  Body mass index is 51.02 kg/m².  Patient has been counseled on diet and lifestyle modifications  Recommended outpatient weight management program and bariatric surgery evaluation  Pt educated on the increase of morbidity and mortality associated with excess weight including DM, Heart Disease, HTN, stroke, and sleep apnea.  Pt advised weight loss of 5% through reduced calorie, low carb diet and 150 mins of exercise a week      KATHIA (obstructive sleep apnea)- (present on  admission)  Assessment & Plan  Continue nocturnal O2    ?possible Schizophrenia vs Bipolar?- (present on admission)  Assessment & Plan  Continue home regimen    Advance care planning- (present on admission)  Assessment & Plan  I discussed advance care planning for at least 17 minutes with the patient including diagnosis of A-fib, schizophrenia, asthma, COPD, morbid obesity, chronic respiratory failure, vertigo and possible stroke.  I discussed her prognosis, plan of care, risks and benefits of any therapies that could be offered, as well as alternatives including palliation. The patient has opted to remain a full code and would like full medical therapy at this time.  She would like to be resuscitated with chest compressions and intubation if necessary. Time spent is exclusive of evaluation and management or other separately billable procedures.           VTE prophylaxis: enoxaparin ppx

## 2024-08-19 NOTE — ASSESSMENT & PLAN NOTE
Patient will be placed on continuous cardiac monitoring to evaluate for any arrhythmias  Q4 hours neuro checks  I will order MRI brain to rule out posterior stroke  IV fluid hydration, supportive care, IV antiemetics and meclizine as needed  PTOT and ST evaluation

## 2024-08-19 NOTE — ASSESSMENT & PLAN NOTE
Body mass index is 51.02 kg/m².  Patient has been counseled on diet and lifestyle modifications  Recommended outpatient weight management program and bariatric surgery evaluation  Pt educated on the increase of morbidity and mortality associated with excess weight including DM, Heart Disease, HTN, stroke, and sleep apnea.  Pt advised weight loss of 5% through reduced calorie, low carb diet and 150 mins of exercise a week

## 2024-08-20 VITALS
DIASTOLIC BLOOD PRESSURE: 76 MMHG | RESPIRATION RATE: 16 BRPM | BODY MASS INDEX: 52.03 KG/M2 | WEIGHT: 275.57 LBS | HEIGHT: 61 IN | HEART RATE: 59 BPM | TEMPERATURE: 97.2 F | OXYGEN SATURATION: 94 % | SYSTOLIC BLOOD PRESSURE: 144 MMHG

## 2024-08-20 LAB
ANION GAP SERPL CALC-SCNC: 7 MMOL/L (ref 7–16)
BUN SERPL-MCNC: 11 MG/DL (ref 8–22)
CALCIUM SERPL-MCNC: 9.2 MG/DL (ref 8.5–10.5)
CHLORIDE SERPL-SCNC: 106 MMOL/L (ref 96–112)
CO2 SERPL-SCNC: 28 MMOL/L (ref 20–33)
CREAT SERPL-MCNC: 0.88 MG/DL (ref 0.5–1.4)
ERYTHROCYTE [DISTWIDTH] IN BLOOD BY AUTOMATED COUNT: 54.7 FL (ref 35.9–50)
GFR SERPLBLD CREATININE-BSD FMLA CKD-EPI: 80 ML/MIN/1.73 M 2
GLUCOSE SERPL-MCNC: 97 MG/DL (ref 65–99)
HCT VFR BLD AUTO: 38.6 % (ref 37–47)
HGB BLD-MCNC: 11.6 G/DL (ref 12–16)
MCH RBC QN AUTO: 27.5 PG (ref 27–33)
MCHC RBC AUTO-ENTMCNC: 30.1 G/DL (ref 32.2–35.5)
MCV RBC AUTO: 91.5 FL (ref 81.4–97.8)
PLATELET # BLD AUTO: 204 K/UL (ref 164–446)
PMV BLD AUTO: 10.7 FL (ref 9–12.9)
POTASSIUM SERPL-SCNC: 4.9 MMOL/L (ref 3.6–5.5)
RBC # BLD AUTO: 4.22 M/UL (ref 4.2–5.4)
SODIUM SERPL-SCNC: 141 MMOL/L (ref 135–145)
TROPONIN T SERPL-MCNC: 27 NG/L (ref 6–19)
WBC # BLD AUTO: 9.1 K/UL (ref 4.8–10.8)

## 2024-08-20 PROCEDURE — A9270 NON-COVERED ITEM OR SERVICE: HCPCS | Mod: UD | Performed by: INTERNAL MEDICINE

## 2024-08-20 PROCEDURE — 700105 HCHG RX REV CODE 258: Mod: UD | Performed by: INTERNAL MEDICINE

## 2024-08-20 PROCEDURE — 96375 TX/PRO/DX INJ NEW DRUG ADDON: CPT

## 2024-08-20 PROCEDURE — 700111 HCHG RX REV CODE 636 W/ 250 OVERRIDE (IP): Mod: JZ,UD | Performed by: INTERNAL MEDICINE

## 2024-08-20 PROCEDURE — 99239 HOSP IP/OBS DSCHRG MGMT >30: CPT | Performed by: STUDENT IN AN ORGANIZED HEALTH CARE EDUCATION/TRAINING PROGRAM

## 2024-08-20 PROCEDURE — 700102 HCHG RX REV CODE 250 W/ 637 OVERRIDE(OP): Mod: UD | Performed by: INTERNAL MEDICINE

## 2024-08-20 PROCEDURE — G0378 HOSPITAL OBSERVATION PER HR: HCPCS

## 2024-08-20 PROCEDURE — 36415 COLL VENOUS BLD VENIPUNCTURE: CPT

## 2024-08-20 PROCEDURE — 97162 PT EVAL MOD COMPLEX 30 MIN: CPT

## 2024-08-20 PROCEDURE — 97166 OT EVAL MOD COMPLEX 45 MIN: CPT

## 2024-08-20 PROCEDURE — 700105 HCHG RX REV CODE 258: Mod: UD

## 2024-08-20 PROCEDURE — 84484 ASSAY OF TROPONIN QUANT: CPT

## 2024-08-20 PROCEDURE — 92612 ENDOSCOPY SWALLOW (FEES) VID: CPT

## 2024-08-20 PROCEDURE — 80048 BASIC METABOLIC PNL TOTAL CA: CPT

## 2024-08-20 PROCEDURE — 85027 COMPLETE CBC AUTOMATED: CPT

## 2024-08-20 RX ORDER — MECLIZINE HYDROCHLORIDE 25 MG/1
25 TABLET ORAL 3 TIMES DAILY PRN
Qty: 30 TABLET | Refills: 0 | Status: SHIPPED | OUTPATIENT
Start: 2024-08-20

## 2024-08-20 RX ORDER — SODIUM CHLORIDE, SODIUM LACTATE, POTASSIUM CHLORIDE, AND CALCIUM CHLORIDE .6; .31; .03; .02 G/100ML; G/100ML; G/100ML; G/100ML
500 INJECTION, SOLUTION INTRAVENOUS ONCE
Status: COMPLETED | OUTPATIENT
Start: 2024-08-20 | End: 2024-08-20

## 2024-08-20 RX ORDER — ONDANSETRON 4 MG/1
4 TABLET, ORALLY DISINTEGRATING ORAL EVERY 6 HOURS PRN
Qty: 10 TABLET | Refills: 0 | Status: SHIPPED | OUTPATIENT
Start: 2024-08-20

## 2024-08-20 RX ADMIN — APIXABAN 5 MG: 5 TABLET, FILM COATED ORAL at 06:11

## 2024-08-20 RX ADMIN — GABAPENTIN 300 MG: 300 CAPSULE ORAL at 06:09

## 2024-08-20 RX ADMIN — SODIUM CHLORIDE, POTASSIUM CHLORIDE, SODIUM LACTATE AND CALCIUM CHLORIDE: 600; 310; 30; 20 INJECTION, SOLUTION INTRAVENOUS at 15:16

## 2024-08-20 RX ADMIN — ONDANSETRON 4 MG: 2 INJECTION INTRAMUSCULAR; INTRAVENOUS at 13:38

## 2024-08-20 RX ADMIN — METOPROLOL SUCCINATE 25 MG: 25 TABLET, EXTENDED RELEASE ORAL at 06:11

## 2024-08-20 RX ADMIN — SODIUM CHLORIDE, POTASSIUM CHLORIDE, SODIUM LACTATE AND CALCIUM CHLORIDE: 600; 310; 30; 20 INJECTION, SOLUTION INTRAVENOUS at 01:18

## 2024-08-20 RX ADMIN — OMEPRAZOLE 20 MG: 20 CAPSULE, DELAYED RELEASE ORAL at 06:11

## 2024-08-20 RX ADMIN — SODIUM CHLORIDE, POTASSIUM CHLORIDE, SODIUM LACTATE AND CALCIUM CHLORIDE 500 ML: 600; 310; 30; 20 INJECTION, SOLUTION INTRAVENOUS at 00:12

## 2024-08-20 RX ADMIN — ARIPIPRAZOLE 30 MG: 10 TABLET ORAL at 06:10

## 2024-08-20 RX ADMIN — MOMETASONE FUROATE AND FORMOTEROL FUMARATE DIHYDRATE 2 PUFF: 100; 5 AEROSOL RESPIRATORY (INHALATION) at 06:09

## 2024-08-20 ASSESSMENT — COGNITIVE AND FUNCTIONAL STATUS - GENERAL
DRESSING REGULAR LOWER BODY CLOTHING: A LOT
MOBILITY SCORE: 17
STANDING UP FROM CHAIR USING ARMS: A LITTLE
WALKING IN HOSPITAL ROOM: A LITTLE
DAILY ACTIVITIY SCORE: 16
HELP NEEDED FOR BATHING: A LOT
MOVING FROM LYING ON BACK TO SITTING ON SIDE OF FLAT BED: A LITTLE
TURNING FROM BACK TO SIDE WHILE IN FLAT BAD: A LITTLE
PERSONAL GROOMING: A LITTLE
TOILETING: A LITTLE
SUGGESTED CMS G CODE MODIFIER MOBILITY: CK
SUGGESTED CMS G CODE MODIFIER DAILY ACTIVITY: CK
CLIMB 3 TO 5 STEPS WITH RAILING: A LOT
DRESSING REGULAR UPPER BODY CLOTHING: A LITTLE
MOVING TO AND FROM BED TO CHAIR: A LITTLE
EATING MEALS: A LITTLE

## 2024-08-20 ASSESSMENT — GAIT ASSESSMENTS
ASSISTIVE DEVICE: FRONT WHEEL WALKER
GAIT LEVEL OF ASSIST: CONTACT GUARD ASSIST
DISTANCE (FEET): 50

## 2024-08-20 ASSESSMENT — ACTIVITIES OF DAILY LIVING (ADL): TOILETING: INDEPENDENT

## 2024-08-20 ASSESSMENT — PAIN DESCRIPTION - PAIN TYPE
TYPE: ACUTE PAIN
TYPE: ACUTE PAIN

## 2024-08-20 NOTE — PROGRESS NOTES
Monitor summary: SB/SR 58-62, RI 0.16, QRS 0.06, QT 0.38, with rare/occasional PACs per strip from monitor room.

## 2024-08-20 NOTE — THERAPY
"Speech Language Pathology   Clinical Swallow Evaluation     Patient Name: Lorene Haro  AGE:  48 y.o., SEX:  female  Medical Record #: [id number]  Date of Service: 8/19/2024      History of Present Illness  49 y/o female admitted 8/19 with sudden onset of dizziness resulting in GLF. Reports 2d of malaise with nausea and poor intake.     CMHx: Vertigo, dysphagia  PMHx: afib, bipolar 2, COPD (chronic 3L), HTN, obesity    Seen by SLP at [facility] in 11/2022. FEES completed recommended MM5/MT2 given findings of silent aspiration of TN0. Seen by SLP in 1/2023 with recommendations for MBSS, which was not completed per notes available in EMR.     CT Head 8/19:  \"No acute intracranial abnormality.\"    CXR 8/19:  \"No acute cardiac or pulmonary abnormalities are identified. Lung volumes are low.\"    General Information:  Vitals  Pulse Oximetry: 97 %  O2 (LPM): 2  O2 Delivery Device: Silicone Nasal Cannula  Level of Consciousness: Alert, Awake  Patient Behaviors: Calm, cooperative  Orientation: Oriented x 4  Follows Directives: Yes    Prior Living Situation & Level of Function:  Housing / Facility: Group Home  Lives with - Patient's Self Care Capacity: Unrelated Adult  Comments: lives with 4 roommates. Reports that she is I with IADLs at baseline. EMR reports poor ability to care for self  Communication: Pt reports deficits in numerical reasoning and attention at baseline  Swallowing: Hx of dysphagia - pt reports RG/TN diet at baseline but she does recall swallow study completed in 2022 as well as outpatient SLP hx. Consistent with EMR, she reports that she did not recieve a referral for an OP MBSS despite SLP recommendation. Recent PNA    Oral Mechanism Evaluation:  Dentition: Poor, Scattered dentition, Upper denture, Denture(s) not available at time of evaluation   Facial Symmetry: Equal  Facial Sensation: Equal     Labial Observations: WFL   Lingual Observations: Midline  Motor Speech: WFL    Laryngeal Function:  Secretion " "Management: Adequate  Voice Quality: Hoarse  Cough: Perceptually WNL  Comments: Reports her voice sounds \"off\" when she is feeling sick      Subjective  Pt agreeable and cooperative with SLP evaluation tasks. \"I can tell I'm getting hungry.\" She states that she has been having more difficulty with swallowing since \"getting sick.\"      Assessment  Current Method of Nutrition: NPO until cleared by speech pathology  Positioning: Molina's (60-90 degrees)  Bolus Administration: SLP, Patient  O2 (LPM): 2 O2 Delivery Device: Silicone Nasal Cannula  Factor(s) Affecting Performance: Impaired endurance  Tracheostomy : No    Swallowing Trials:  Ice: Impaired  Thin Liquid (TN0): Impaired  Liquidised (LQ3): WFL      Comments:   Oral phase unremarkable for trialed consistencies. Immediate cough response and reddening of the face following ice chips and sips of TN0, concerning for airway invasion. Discussed options for dysphagia management, and patient verbalized agreement to FEES.       Clinical Impressions  Pt presents with clinical indicators of and is at elevated risk for oropharyngeal dysphagia given neuro changes, recent PNA, and history of dysphagia. Pt would benefit from further evaluation of swallow using FEES prior to meaningful initiation of PO. Pt should have ice chips after oral care with assistance from trained staff to mitigate the impacts of xerostomia and disuse atrophy as well as to provide comfort and aid with secretion management.       Recommendations  NPO pending FEES   - Ice chips after oral care   - PO meds as tolerated, likely floated in applesauce or puree  Instrumentation: FEES  Medication: Whole with puree  Supervision: Close supervision - patient may be left alone for less than 5 minutes at a time with ice chips  Positioning: Fully upright and midline during oral intake  Oral Care: Q4h       SLP Treatment Plan  Treatment Plan: Dysphagia Treatment, Patient/Family/Caregiver Training  SLP Frequency: 4x " "Per Week  Estimated Duration: Until Therapy Goals Met      Anticipated Discharge Needs  Discharge Recommendations: Recommend outpatient speech therapy services (changes pending FEES)   Therapy Recommendations Upon DC: Dysphagia Training, Cognitive-Linguistic Training, Community Re-Integration, Patient / Family / Caregiver Education        Patient / Family Goals  Patient / Family Goal #1: \"Can I have some pudding?\"  Short Term Goals  Short Term Goal # 1: Pt will complete FEES w SLP to further evaluate swallow function and inform POC.      Renae Joy, SLP   "

## 2024-08-20 NOTE — DISCHARGE PLANNING
DC Transport Scheduled    Transport Company Scheduled:  [employer]  Spoke with Darline at [facility] to schedule transport.  [facility] Trip # [id number]    Scheduled Date: 8/20/2024  Scheduled Time: 1730    Destination: [facility]   Destination address: [address], [place] [address].    Notified care team of scheduled transport via [employer].     If there are any changes needed to the DC transportation scheduled, please contact [facility] at ext. [phone] between the hours of 2634-5582 Mon-Fri. If outside those hours, contact the ED Case Manager at ext. [phone].      Detail Level: Detailed

## 2024-08-20 NOTE — PROGRESS NOTES
2353: Patient's BP 92/35 with MAP of 54 and pulse of 57, spO2 97% on RA. NOC hospitalist Rick Mullins notified. Bolus orders received and administered- see MAR    0045: Patient BP rechecked: 110/59 with MAP of 76

## 2024-08-20 NOTE — CARE PLAN
The patient is Stable - Low risk of patient condition declining or worsening    Shift Goals  Clinical Goals: MRI, advance diet as tolerated, pt safety  Patient Goals: pain control, rest  Family Goals: ARAM    Progress made toward(s) clinical / shift goals:    Problem: Psychosocial - Patient Condition  Goal: Patient's ability to verbalize feelings about condition will improve  8/20/2024 1539 by Chapis Valdovinos ROmarN.  Outcome: Progressing  8/20/2024 1538 by Chapis Valdovinos R.N.  Outcome: Progressing  Goal: Patient's ability to re-evaluate and adapt role responsibilities will improve  8/20/2024 1539 by SHILPA AugustineN.  Outcome: Progressing  8/20/2024 1538 by SHILPA AugustineN.  Outcome: Progressing     Problem: Neuro Status  Goal: Neuro status will remain stable or improve  8/20/2024 1539 by Chapis Valdovinos R.N.  Outcome: Progressing  8/20/2024 1538 by Chapis Valdovinos RJESE.  Outcome: Progressing     Problem: Hemodynamic Monitoring  Goal: Patient's hemodynamics, fluid balance and neurologic status will be stable or improve  Outcome: Progressing     Problem: Urinary Elimination  Goal: Establish and maintain regular urinary output  Outcome: Progressing     Problem: Bowel Elimination  Goal: Establish and maintain regular bowel function  Outcome: Progressing     Problem: Self Care  Goal: Patient will have the ability to perform ADLs independently or with assistance (bathe, groom, dress, toilet and feed)  Outcome: Progressing     Problem: Knowledge Deficit - Standard  Goal: Patient and family/care givers will demonstrate understanding of plan of care, disease process/condition, diagnostic tests and medications  Outcome: Progressing     Problem: Pain - Standard  Goal: Alleviation of pain or a reduction in pain to the patient’s comfort goal  Outcome: Progressing     Problem: Fall Risk  Goal: Patient will remain free from falls  Outcome: Progressing     Problem: Skin Integrity  Goal: Skin integrity is maintained or  improved  Outcome: Progressing     Problem: Dysphagia  Goal: Dysphagia will improve  Outcome: Met     Problem: Risk for Aspiration  Goal: Patient's risk for aspiration will be absent or decrease  Outcome: Met     Pt evaluated by SLP - cleared for regular diet and tolerating well. Neuro status remains stable, all WNL aside from baseline neuropathy in hands/feet. No falls this admission. Pt expressing feelings about admission to care team. Frequent urinary and bowel elimination - use of BSC. Progressing towards being able to perform ADLs. MRI completed - WNL.     Patient is not progressing towards the following goals:

## 2024-08-20 NOTE — THERAPY
"Speech Language Pathology   Cognitive Evaluation     Patient Name: Lorene Haro  AGE:  48 y.o., SEX:  female  Medical Record #: [id number]  Date of Service: 8/19/2024      History of Present Illness  47 y/o female admitted 8/19 with sudden onset of dizziness resulting in GLF. Reports 2d of malaise with nausea and poor intake.     CMHx: Vertigo, dysphagia  PMHx: afib, bipolar 2, COPD (chronic 3L), HTN, obesity    Seen by SLP at [facility] in 11/2022 for dysphagia. No prior cog/SLE service per EMR.     CXR 8/19:  \"No acute cardiac or pulmonary abnormalities are identified. Lung volumes are low.\"    General Information  Vitals  Pulse Oximetry: 97 %  O2 (LPM): 2  O2 Delivery Device: Silicone Nasal Cannula  Level of Consciousness: Alert, Awake  Patient Behaviors:  Calm, cooperative  Orientation: Oriented x 4  Follows Directives: Yes    Prior Living Situation & Level of Function  Housing / Facility: Group Home  Lives with - Patient's Self Care Capacity: Unrelated Adult  Comments: lives with 4 roommates. Reports that she is I with IADLs at baseline. EMR reports poor ability to care for self  Communication: Pt reports deficits in numerical reasoning and attention at baseline  Swallowing: Hx of dysphagia - pt reports RG/TN diet at baseline but she does recall swallow study completed in 2022 as well as outpatient SLP hx. Consistent with EMR, she reports that she did not recieve a referral for an OP MBSS despite SLP recommendation     Oral Mechanism Evaluation  Motor Speech: WFL  Comments: Reports her voice sounds \"off\" when she is feeling sick    Subjective  Pt agreeable and cooperative with SLP evaluation tasks. Reports that performance on tasks is consistent with her baseline - \"I can't do division.\"      Communication Domain(s)  Expressive Language: WFL  Receptive Language: WFL  Cognitive-Linguistic: Moderate  Motor Speech: WFL     Assessment  The patient was seen this date for a cognitive " evaluation.    Cognistat  Orientation: Average  Attention: Moderate  Able to attend to evaluation tasks for duration of session without need for redirection from SLP  Repetition: Mild   Suspect related to attention deficits over language deficit  Naming: Average  Memory: Average  Calculations: Moderate  Able to correctly answer 1/4 independently. Pt then identified a calculator as tool that could improve numerical reasoning. Given a calculator, patient correctly answered 3/4 targets  Similarities: Average  Judgement: Average    Medication Management  Pt demonstrated excellent synthesis of information during medication management tasks. Answered temporal and numerical reasoning tasks with 66% accuracy. Good problem solving demonstrated when asked for a memory enhancing strategy. Pt does report taking routine medications at home; described home medication management in detail. Good judgement and divergent thinking when asked to list questions they would want to ask about a newly prescribed medication.     Other Non-Standard Measures  1-step commands: 100%  2-step commands: 100%  3-step commands: 100%  Picture Description Task: Appropriate language fluency and content. No inattention noted. Pt able to identify problems in the picture and propose reasonable solutions.       Clinical Impressions  Per results of formal and informal cognitive evaluations, the patient presents with cognition consistent with or approaching their baseline level of functioning. Therefore, no further acute speech pathology services are indicated at this time.  Discussed with patient that, should higher level deficits impact the patient's ability to resume premorbid activities, recommend outpatient speech therapy follow up. Patient verbalized understanding and agreement.      NOTE: It is not within the scope of practice of Speech-Language Pathologists to determine patient capacity. Please defer to the physician or psych to complete this  assessment.       Recommendations  Supervision Needs Upons Discharge: Intermittent assistance with IADLs (see below)  IADLs: Medication management, Financial management, Appointment management, Household chores, Cooking       SLP Treatment Plan  Treatment Plan: None Indicated  SLP Frequency: N/A - Evaluation Only  Estimated Duration: N/A - Evaluation Only      Anticipated Discharge Needs  Discharge Recommendations: Recommend outpatient speech therapy services  Therapy Recommendations Upon DC: Cognitive-Linguistic Training, Community Re-Integration, Patient / Family / Caregiver Education      Renae Joy, SLP

## 2024-08-20 NOTE — DISCHARGE PLANNING
Received Choice Form @: 0231  Agency/Facility Name: [facility]  Referral Sent Per Choice Form @: 4261 -8822  DPA sent HH referral to [facility].

## 2024-08-20 NOTE — THERAPY
"Occupational Therapy   Initial Evaluation     Patient Name: Lorene Haro  Age:  48 y.o., Sex:  female  Medical Record #: [id number]  Today's Date: 8/20/2024     Precautions  Precautions: (P) Fall Risk, Swallow Precautions    Assessment  Patient is a 48 y.o. female admitted 8/19 with sudden onset of dizziness, nausea and poor oral intake. Pt had a fall backwards and was unable to ambulate. Additional factors influencing patient status / progress: PMHx includes atrial fibrillation on Eliquis, bipolar 2 disorder, COPD, chronic respiratory failure on 3 L of oxygen, hypertension, obesity, schizophrenia.      During OT eval, pt presented with impaired activity tolerance, strength and functional mobility. Pt needed maxA for LBD, Donnie for toileting and SBA for standing g/h. Pt provided varying information for her PLOF and ability of group home staff to assist her if needed. Reports she sometimes needs help getting dressed and going to the bathroom but that group home staff do not assist her but they help other residents. Recommend post-acute placement at this time unless confirmed with group home staff that they are able to assist pt as needed with ADLs and related functional mobility.     Plan    Occupational Therapy Initial Treatment Plan   Treatment Interventions: (P) Self Care / Activities of Daily Living, Adaptive Equipment, Neuro Re-Education / Balance, Therapeutic Exercises, Therapeutic Activity  Treatment Frequency: (P) 3 Times per Week  Duration: (P) Until Therapy Goals Met    DC Equipment Recommendations: (P) Unable to determine at this time  Discharge Recommendations: (P) Recommend post-acute placement for additional occupational therapy services prior to discharge home (unless pt's group home is able to assist pt with ADLs and related functional mobility)     Subjective    \"My legs hurt.\"     Objective     08/20/24 0853   Initial Contact Note    Initial Contact Note Order Received and Verified, Occupational " Therapy Evaluation in Progress with Full Report to Follow.   Prior Living Situation   Prior Services Intermittent Physical Support for ADL Per Service   Housing / Facility Group Home   Bathroom Set up Bathtub / Shower Combination;Shower Chair   Equipment Owned 4-Wheel Walker;Tub / Shower Seat;Oxygen   Lives with - Patient's Self Care Capacity Attendant / Paid Care Giver   Comments Pt reports she lives in a group home. States group home staff assists other residents with ADLs/IADLs but does not assist her at baseline. Unclear whether group home staff are able to provide pt with assistance if needed.   Prior Level of ADL Function   Self Feeding Independent   Grooming / Hygiene Independent   Bathing Requires Assist   Dressing Requires Assist   Toileting Independent   Prior Level of IADL Function   Medication Management Independent   Laundry Requires Assist   Kitchen Mobility Independent   Finances Requires Assist   Home Management Requires Assist   Shopping Requires Assist   Prior Level Of Mobility Independent With Device in Home   Driving / Transportation Relatives / Others Provide Transportation;Utilizes Public Transportation  (RTC Access)   History of Falls   History of Falls Yes   Date of Last Fall   (fell backwards prior to admit)   Precautions   Precautions Fall Risk;Swallow Precautions   Vitals   Pulse Oximetry 91 %   O2 (LPM) 2   O2 Delivery Device Nasal Cannula   Vitals Comments Intermittently desats to 88-89% on RA, VSS otherwise stable   Pain 0 - 10 Group   Location Leg   Location Orientation Left;Right   Pain Rating Scale (NPRS)   (did not quantify)   Description Aching   Non Verbal Descriptors   Non Verbal Scale  Calm   Cognition    Cognition / Consciousness X   Speech/ Communication Dysarthric   Level of Consciousness Alert   Ability To Follow Commands 2 Step   New Learning Impaired   Attention Impaired   Comments Pleasant and cooperative, difficulty understanding pt at times due to dysarthric speech.  Difficulty describing PLOF and assist she could possibly have from group home staff, questionable historian   Active ROM Upper Body   Active ROM Upper Body  WDL   Strength Upper Body   Upper Body Strength  X   Gross Strength Generalized Weakness, Equal Bilaterally.    Comments Unable to open new bottle of mouthwash   Sensation Upper Body   Upper Extremity Sensation  Not Tested   Upper Body Muscle Tone   Upper Body Muscle Tone  WDL   Neurological Concerns   Neurological Concerns No   Coordination Upper Body   Coordination WDL   Balance Assessment   Sitting Balance (Static) Fair   Sitting Balance (Dynamic) Fair   Standing Balance (Static) Fair -   Standing Balance (Dynamic) Fair -   Weight Shift Sitting Fair   Weight Shift Standing Fair   Comments with 4WW   Bed Mobility    Supine to Sit Minimal Assist   Sit to Supine Standby Assist   Scooting Standby Assist   Rolling Standby Assist   Comments HOB slightly elevated for supine to sit, bed flat for sit to supine   ADL Assessment   Grooming Standby Assist;Standing  (hand hygiene, oral care. Cues for sequencing)   Lower Body Dressing Maximal Assist  (doff/don socks)   Toileting Minimal Assist  (assist for hygiene, no clothing to manage)   How much help from another person does the patient currently need...   Putting on and taking off regular lower body clothing? 2   Bathing (including washing, rinsing, and drying)? 2   Toileting, which includes using a toilet, bedpan, or urinal? 3   Putting on and taking off regular upper body clothing? 3   Taking care of personal grooming such as brushing teeth? 3   Eating meals? 3   6 Clicks Daily Activity Score 16   Functional Mobility   Sit to Stand Contact Guard Assist   Toilet Transfers Contact Guard Assist   Transfer Method Stand Step   Mobility EOB>bathroom in [place]>sink>EOB with 4WW   Activity Tolerance   Sitting Edge of Bed ~5 min   Standing 3-4 min total   Patient / Family Goals   Patient / Family Goal #1 None stated   Short  Term Goals   Short Term Goal # 1 Pt will perform LBD with SPV   Short Term Goal # 2 Pt will perform ADL transfers with SPV   Short Term Goal # 3 Pt will perform standing g/h routine with SPV   Education Group   Role of Occupational Therapist Patient Response Patient;Acceptance;Explanation;Verbal Demonstration   Occupational Therapy Initial Treatment Plan    Treatment Interventions Self Care / Activities of Daily Living;Adaptive Equipment;Neuro Re-Education / Balance;Therapeutic Exercises;Therapeutic Activity   Treatment Frequency 3 Times per Week   Duration Until Therapy Goals Met   Problem List   Problem List Decreased Active Daily Living Skills;Decreased Homemaking Skills;Decreased Upper Extremity Strength Right;Decreased Upper Extremity Strength Left;Decreased Functional Mobility;Decreased Activity Tolerance   Anticipated Discharge Equipment and Recommendations   DC Equipment Recommendations Unable to determine at this time   Discharge Recommendations Recommend post-acute placement for additional occupational therapy services prior to discharge home  (unless pt's group home is able to assist pt with ADLs and related functional mobility)   Interdisciplinary Plan of Care Collaboration   IDT Collaboration with  Nursing;Physical Therapist   Patient Position at End of Therapy In Bed;Bed Alarm On;Call Light within Reach;Tray Table within Reach;Phone within Reach   Collaboration Comments RN aware   Session Information   Date / Session Number  8/20 #1 (1/3, 8/26)     Pt seen for OT eval in coordination with PT due to the need for two skilled therapists due to limited mobility and BMI 52.

## 2024-08-20 NOTE — PROGRESS NOTES
Patient BP 92/35 with MAP of 54. NOC hospitalist Harpreet notified. Orders for bolus received and administered. See MAR.    Post bolus bp improved to 110/58 with MAP 75.    0419: Patient BP 99/43 and MAP of 62. Harpreet notified.

## 2024-08-20 NOTE — DISCHARGE PLANNING
Case Management Discharge Planning    Admission Date: 8/19/2024  GMLOS:    ALOS: 0    6-Clicks ADL Score: 16  6-Clicks Mobility Score: 17  PT and/or OT Eval ordered: Yes  Post-acute Referrals Ordered: No  Post-acute Choice Obtained: NA  Has referral(s) been sent to post-acute provider:  No    Anticipated Discharge Dispo: Discharge Disposition: D/T to home under A care in anticipation of covered skilled care (06)  Discharge Address: [address]  Discharge Contact Phone Number: [phone]    DME Needed: No    Action(s) Taken:   Per MD, pt is medically clear.     HH orders were placed and referrals were sent per choice form. No accepting  agency at this time d/t non-contracted insurance provider. Per MD, pt is safe to discharge back to  pending  acceptance. Pt was provided with the [facility] Pending Patient Notice and pt verbalized understanding. Out-patient therapy order to be placed by MD.     CM RN attempted to contact [facility] ([phone]) to confirm pt can return to facility today. No answer. Left a  with direct contact information requesting a call back.     [facility][phone][facility] with [facility] twice. No answer. Left a  with direct contact information requesting a call back.     1623  CM RN received call back from Shirley Alvarez confirming pt is a residence and confirmed pt can return today. Shirley requested transportation be arranged for pt to address verified as [address] [place] [address]. Per Shirley, no need for a negative COVID test before returning to  today.     Transportation request sent to ride line. Pending confirmation of .     Escalations Completed: None    Medically Clear: Yes    Next Steps:  NEREIDA RN to follow up with medical team to discuss discharge barriers or needs.     Barriers to Discharge: None

## 2024-08-20 NOTE — PROGRESS NOTES
4 Eyes Skin Assessment Completed by JC Green and JC Mcgarry.    Head Redness  Ears WDL  Nose WDL  Mouth WDL  Neck WDL  Breast/Chest Redness  Shoulder Blades WDL  Spine WDL  (R) Arm/Elbow/Hand WDL  (L) Arm/Elbow/Hand WDL  Abdomen WDL  Groin Redness  Scrotum/Coccyx/Buttocks WDL  (R) Leg WDL  (L) Leg WDL  (R) Heel/Foot/Toe WDL  (L) Heel/Foot/Toe WDL          Devices In Places Pulse Ox      Interventions In Place N/A    Possible Skin Injury Yes    Pictures Uploaded Into Epic No, needs to be completed  Wound Consult Placed Yes  RN Wound Prevention Protocol Ordered No

## 2024-08-20 NOTE — DISCHARGE PLANNING
NEPHROLOGY CONSULTATION:    Presenting complaints: Shortness of breath, hyperkalemia    History of presenting complaints: 60-year-old patient with a history of ESRD, pleural effusion, multiple previous hospitalizations with pneumonia and hyperkalemia, noncompliance with dialysis treatments, diet, presents to the emergency room with complaints of fatigue and weakness, and shortness of breath that started last night.  Patient is on dialysis on Monday Wednesday and Friday and had dialysis on Wednesday.  She has a PermCath in place.  Uses oxygen at home.  History of pleural effusions in the past, has refused thoracentesis.  Evaluation in the emergency room showed some bradycardia with a heart rate of 55, potassium of 5.5, and possible UTI.  Chest x-ray showed pleural effusion    Patient was seen and examined on dialysis.  Hemodialysis orders were reviewed.  We are using PermCath for dialysis.  She states feeling better.  No fever or chills.  She does make some urine.      Review of systems:   12 system review of systems was done, positive information is included in the history of present illness.  Other systems were reviewed and negative.  No bleeding manifestations.  Shortness of breath is improved with dialysis.  History of hyperkalemia and pleural effusion in the past.  No chest pain or palpitations.  Denies abdominal pain.  No fever.  States making some urine, no urinary complaints.  Lives with her son at home.  Previously refused nursing home placement.      Past medical illness:  Patient Active Problem List   Diagnosis   • PVD (peripheral vascular disease) (CMS/Formerly [facility])   • Status post bilateral below knee amputation (CMS/[facility]ly [facility])   • Renal failure, chronic   • Gastroesophageal reflux disease   • Constipation   • Anxiety   • ESRD on hemodialysis (CMS/Formerly [facility])   • A-V fistula (CMS/[facility]ly [facility])   • Hypertension   • Type 2 diabetes mellitus with circulatory disorder, without long-term current use of insulin (CMS/[facility]ly [facility])   • Gangrene of  Spoke with patient at bedside and patient initialed choice form updates after discussion. Updated choice faxed to Fernando JAQUEZ. Message sent to Fernando to update.    finger (CMS/HCC)   • Finger pain, right   • Carpal tunnel syndrome of right wrist   • Acute bacterial conjunctivitis of both eyes   • Community acquired pneumonia of left lower lobe of lung (CMS/HCC)   • Hyponatremia   • Skin irritation - groin   • Hypothermia   • Loculated pleural effusion   • Stage 5 chronic kidney disease on chronic dialysis (CMS/HCC)   • Left lower lobe pneumonia (CMS/HCC)   • Congestive heart failure (CMS/HCC)   • Dyspnea   • Pleural effusion on left   • Hyperkalemia   • Overweight (BMI 25.0-29.9)   • Noncompliance   • Bradycardia     Past Surgical History:   Procedure Laterality Date   • AMPUTATION DIGIT Right 11/7/2017    Procedure: AMPUTATION PARTIAL OF RING AND LONG FINGERS ON RIGHT ;  Surgeon: Delfin Vergara MD;  Location: [facility] OR;  Service:    • ANAL FISTULOTOMY Right 05/25/2006    Right anterior fistula in ano. Fistulotomy   • ARTERIOVENOUS FISTULA/SHUNT SURGERY Right 8/23/2017    Procedure: REVISION  RIGHT ARTERIOVENOUS FISTULA   (ligation);  Surgeon: Vahid Aviles MD;  Location: [facility] OR;  Service:    • BELOW KNEE AMPUTATION Right 11/30/2012    right below knee amputation. vascular insufficiency of right leg. gangrene R foot. Diabetes mellitus incontrolled   • BELOW KNEE LEG AMPUTATION Left    • CARPAL TUNNEL RELEASE Right 12/5/2017    Procedure: CARPAL TUNNEL RELEASE;  Surgeon: Delfin Vergara MD;  Location: [facility] OR;  Service:    • CHOLECYSTECTOMY  10/28/1999    With operative cholangiograms   • FRACTURE SURGERY      L humerus   • HAND SURGERY  09/05/2007    Triggering left middle and ring fingers. Flexor tendo vaginotomies left middle and ring fingers   • HUMERUS SURGERY Left 01/15/2009    Closed interlock intramedullary nailing fracture proximal left humerus.   • INTERVENTIONAL RADIOLOGY PROCEDURE Right 7/20/2017    Procedure: IR dialysis fistulagram;  Surgeon: Vahid Aviles MD;  Location: [facility] ANGIO INVASIVE LOCATION;  Service:    • TONSILLECTOMY     •  TRIGGER FINGER RELEASE     • TUBAL ABDOMINAL LIGATION     • VEIN TRANSPOSITION Right 5/30/2017    Procedure: RIGHT BASILIC VEIN TRANSPOSITION;  Surgeon: Vahid Aviles MD;  Location: [facility];  Service:        Social history:    Social History     Social History   • Marital status:      Spouse name: N/A   • Number of children: N/A   • Years of education: N/A     Occupational History   • Not on file.     Social History Main Topics   • Smoking status: Former Smoker     Packs/day: 2.00     Years: 8.00     Types: Cigarettes     Quit date: 1/3/1981   • Smokeless tobacco: Never Used   • Alcohol use No   • Drug use: No   • Sexual activity: Defer     Other Topics Concern   • Not on file     Social History Narrative   • No narrative on file       Family history:    Family History   Problem Relation Age of Onset   • Adopted: Yes   • Tuberculosis Father        Medication list:    No current facility-administered medications on file prior to encounter.      Current Outpatient Prescriptions on File Prior to Encounter   Medication Sig Dispense Refill   • acetaminophen (TYLENOL) 650 MG 8 hr tablet Take 1 tablet by mouth Every 8 (Eight) Hours As Needed for mild pain (1-3). 60 tablet 0   • amLODIPine (NORVASC) 5 MG tablet TAKE ONE TABLET BY MOUTH ONCE DAILY 30 tablet 11   • atorvastatin (LIPITOR) 20 MG tablet TAKE ONE TABLET BY MOUTH ONCE DAILY 30 tablet 11   • busPIRone (BUSPAR) 5 MG tablet Take 1 tablet by mouth 3 (Three) Times a Day. 90 tablet 11   • citalopram (CeleXA) 20 MG tablet TAKE ONE TABLET BY MOUTH ONCE DAILY 30 tablet 11   • clonazePAM (KlonoPIN) 0.5 MG tablet Take 1 tablet by mouth 2 (Two) Times a Day As Needed for Seizures. 12 tablet 0   • clopidogrel (PLAVIX) 75 MG tablet TAKE ONE TABLET BY MOUTH ONCE DAILY 30 tablet 11   • docusate sodium (COLACE) 100 MG capsule Take 100 mg by mouth Daily As Needed.     • epoetin unruly (EPOGEN,PROCRIT) 37850 UNIT/ML injection Inject 0.3 mL under the skin into the  appropriate area as directed 3 (Three) Times a Week.     • ferrous gluconate 324 (37.5 Fe) MG tablet tablet Take 1 tablet by mouth Daily With Breakfast. 30 tablet 11   • gabapentin (NEURONTIN) 100 MG capsule Take 1 capsule by mouth Daily. 30 capsule 5   • hydrALAZINE (APRESOLINE) 50 MG tablet Take 50 mg by mouth 3 (Three) Times a Day.     • loperamide (IMODIUM) 2 MG capsule Take 2 mg by mouth 4 (Four) Times a Day As Needed for diarrhea.     • metoprolol tartrate (LOPRESSOR) 25 MG tablet Take 1 tablet by mouth Daily.     • ondansetron (ZOFRAN) 8 MG tablet Take 1 tablet by mouth Every 8 (Eight) Hours As Needed for Nausea or Vomiting. 30 tablet 0   • pantoprazole (PROTONIX) 40 MG EC tablet TAKE ONE TABLET BY MOUTH ONCE DAILY 30 tablet 11   • sevelamer (RENVELA) 800 MG tablet Take 800 mg by mouth 3 (Three) Times a Day With Meals.       Scheduled Meds:  amLODIPine 5 mg Oral Daily   atorvastatin 20 mg Oral Daily   [START ON 11/10/2018] ceftriaxone 1 g Intravenous Q24H   And      [START ON 11/10/2018] azithromycin 500 mg Intravenous Q24H   busPIRone 5 mg Oral TID   citalopram 20 mg Oral Daily   clopidogrel 75 mg Oral Daily   epoetin unruly 3,000 Units Subcutaneous Once per day on Mon Wed Fri   [START ON 11/10/2018] ferrous sulfate 324 mg Oral Daily With Breakfast   gabapentin 100 mg Oral Daily   heparin (porcine) 5,000 Units Subcutaneous Q12H   hydrALAZINE 50 mg Oral TID   metoprolol tartrate 25 mg Oral Daily   pantoprazole 40 mg Oral Daily   sevelamer 800 mg Oral TID With Meals   sodium chloride 3 mL Intravenous Q12H     Continuous Infusions:   PRN Meds:•  acetaminophen  •  albumin human  •  clonazePAM  •  docusate sodium  •  heparin (porcine)  •  Morphine **AND** naloxone  •  ondansetron  •  sodium chloride  •  sodium chloride    Allergies:  Metformin and related; Ciprofloxacin; Doxycycline; and Levaquin [levofloxacin]    On examination:  Vitals:    11/09/18 1334 11/09/18 1607 11/09/18 1615 11/09/18 1622   BP:  (!) 155/22  142/50 127/80   BP Location:  Right arm Left arm Left leg   Patient Position:  Lying Lying    Pulse: 54 54 58    Resp:  18 18    Temp:  98.3 °F (36.8 °C) 97.7 °F (36.5 °C)    TempSrc:  Tympanic     SpO2:   95%    Weight:   71.5 kg (157 lb 11.2 oz)    Height:         General:  Chronically ill, chronic erythema around the eyes, patient was seen and examined on dialysis.  Appears comfortable.  Lying on the left lateral position.  Skin: No skin rashes or mucosal bleeding   HEENT:  Pallor present, no icterus.   Conjunctival irritation.  Neck:  No jugular venous distention, or thyroid enlargement.  Chest:  Decreased breath sounds at the lung bases.  Diminished breath sounds at the left lung base.  CVS: Heart sounds are regular, there is no pericardial rub or gallop.  Abdomen: Soft, nontender, normal bowel sounds, no organomegaly.  No rebound or guarding.  No bruit.  Extremities:  Bilateral lower extremity amputation.  Old AV access on the arms which has been ligated  Neuro:  No focal motor neurological deficits.  No asterixis.  lower extremity amputations   Mentation:  Alert and oriented.    Past medical illness, social history, medications, previous notes reviewed.       Laboratory tests:  Imaging Results (last 24 hours)     Procedure Component Value Units Date/Time    CT Abdomen Pelvis Without Contrast [[id number]] Collected:  11/09/18 0834     Updated:  11/09/18 0912    Narrative:       CT abdomen, pelvis without contrast.       CLINICAL INDICATION: Left lower quadrant pain.        COMPARISON: CT abdomen April 16, 2018.    TECHNIQUE: Noncontrast helical scanning with axial and coronal  reformations. Soft tissue, lung, and bone windows reviewed.    This exam was performed according to our departmental  dose-optimization program, which includes automated exposure  control, adjustment of the mA and/or kV according to patient size  and/or use of iterative reconstruction technique.    ABDOMEN CT FINDINGS: Dense consolidation  left lower lobe and  moderate size somewhat loculated pleural effusion . Similar  appearance to prior CT examination April 18, 2018.  Small right-sided effusion similar to prior exam.  Liver, gallbladder, spleen, pancreas, adrenal glands are normal  in appearance within the limitations of nonenhanced scanning. The  kidneys are somewhat small, atrophic. Subtle pericolonic  inflammatory changes adjacent to loop of descending and sigmoid  colon. This may represent mild, early changes of diverticulitis.  No free air or abscess. Dense arterial vascular calcification  involving aorta and renal arteries mesenteric arteries. This type  of calcification is seen in patients with long standing diabetes  or renal failure.  Mild degenerative changes lumbar spine. Lumbar spine is otherwise  unremarkable..    PELVIS CT FINDINGS: Calcified pelvic uterine fibroids.    Bladder  wall thickening suggesting either bladder outlet obstruction or  cystitis. No evidence of pathologically enlarged nodes, free air,  or free fluid.     The bones are grossly unremarkable.      Impression:       CONCLUSION: Left lower lobe consolidation and left-sided  effusion. Small right-sided effusion. Similar appearance to prior  CT exam.    Kidneys are somewhat small, atrophic.    Dense arteriovascular calcifications involve the intra-abdominal  arterial vessels. This may be seen in patients with long-standing  diabetes or renal sufficiency.    Subtle pericolonic infiltrative changes adjacent to a segment of  descending and sigmoid colon. This represent mild, early changes  of diverticulitis. No free air or abscess.    Calcified uterine fibroids. Bladder wall thickening suggesting  either bladder outlet obstruction or cystitis.    Electronically signed by:  Serafin Doll MD  11/9/2018 9:11 AM CST  Workstation: [id number]    XR Chest 1 View [[id number]] Collected:  11/09/18 0721     Updated:  11/09/18 0759    Narrative:       PROCEDURE: Portable chest  x-ray    TECHNIQUE: Single AP view of the chest    COMPARISON: 10/29/2018    HISTORY: Chest pain protocol  chest pain protocol    FINDINGS:     Life-support devices: Right hemodialysis catheter stable in  position. Left femoral hardware noted.    Lungs/pleura: Opacification in the mid to lower left hemithorax  is probably due to a moderate-sized pleural effusion,  atelectasis, and/or consolidation, unchanged compared to prior  exam. There is mild pulmonary vascular congestion.    Heart, hilar and mediastinal structures: Stable accounting for  differences in projection and technique.      Impression:       CONCLUSION:  Opacification in the mid to lower left hemithorax is probably due  to a moderate-sized pleural effusion, atelectasis, and/or  consolidation, unchanged compared to prior exam. There is mild  pulmonary vascular congestion.    Electronically signed by:  Asif Ordonez MD  11/9/2018 7:58 AM CST  Workstation: [id number]          Recent Results (from the past 24 hour(s))   Troponin    Collection Time: 11/09/18  7:22 AM   Result Value Ref Range    Troponin I <0.012 <=0.034 ng/mL   Comprehensive Metabolic Panel    Collection Time: 11/09/18  7:22 AM   Result Value Ref Range    Glucose 81 60 - 100 mg/dL    BUN 42 (H) 7 - 21 mg/dL    Creatinine 3.19 (H) 0.50 - 1.00 mg/dL    Sodium 127 (L) 137 - 145 mmol/L    Potassium 5.5 (H) 3.5 - 5.1 mmol/L    Chloride 94 (L) 95 - 110 mmol/L    CO2 23.0 22.0 - 31.0 mmol/L    Calcium 8.8 8.4 - 10.2 mg/dL    Total Protein 6.5 6.3 - 8.6 g/dL    Albumin 3.30 (L) 3.40 - 4.80 g/dL    ALT (SGPT) 21 9 - 52 U/L    AST (SGOT) 24 14 - 36 U/L    Alkaline Phosphatase 103 38 - 126 U/L    Total Bilirubin 0.4 0.2 - 1.3 mg/dL    eGFR Non  Amer 15 (L) 45 - 104 mL/min/1.73    Globulin 3.2 2.3 - 3.5 gm/dL    A/G Ratio 1.0 (L) 1.1 - 1.8 g/dL    BUN/Creatinine Ratio 13.2 7.0 - 25.0    Anion Gap 10.0 5.0 - 15.0 mmol/L   BNP    Collection Time: 11/09/18  7:22 AM   Result Value Ref Range    proBNP  90,100.0 (H) 0.0 - 900.0 pg/mL   Protime-INR    Collection Time: 11/09/18  7:22 AM   Result Value Ref Range    Protime 14.2 11.1 - 15.3 Seconds    INR 1.12 0.80 - 1.20   Lipase    Collection Time: 11/09/18  7:22 AM   Result Value Ref Range    Lipase 32 23 - 300 U/L   Magnesium    Collection Time: 11/09/18  7:22 AM   Result Value Ref Range    Magnesium 2.0 1.6 - 2.3 mg/dL   Light Blue Top    Collection Time: 11/09/18  7:22 AM   Result Value Ref Range    Extra Tube hold for add-on    Green Top (Gel)    Collection Time: 11/09/18  7:22 AM   Result Value Ref Range    Extra Tube Hold for add-ons.    Lavender Top    Collection Time: 11/09/18  7:22 AM   Result Value Ref Range    Extra Tube hold for add-on    Gold Top - SST    Collection Time: 11/09/18  7:22 AM   Result Value Ref Range    Extra Tube Hold for add-ons.    CBC Auto Differential    Collection Time: 11/09/18  7:22 AM   Result Value Ref Range    WBC 5.98 3.20 - 9.80 10*3/mm3    RBC 3.74 (L) 3.77 - 5.16 10*6/mm3    Hemoglobin 10.6 (L) 12.0 - 15.5 g/dL    Hematocrit 32.8 (L) 35.0 - 45.0 %    MCV 87.7 80.0 - 98.0 fL    MCH 28.3 26.5 - 34.0 pg    MCHC 32.3 31.4 - 36.0 g/dL    RDW 15.3 (H) 11.5 - 14.5 %    RDW-SD 49.6 (H) 36.4 - 46.3 fl    MPV 9.9 8.0 - 12.0 fL    Platelets 185 150 - 450 10*3/mm3    Neutrophil % 77.2 37.0 - 80.0 %    Lymphocyte % 12.9 10.0 - 50.0 %    Monocyte % 6.7 0.0 - 12.0 %    Eosinophil % 2.5 0.0 - 7.0 %    Basophil % 0.5 0.0 - 2.0 %    Immature Grans % 0.2 0.0 - 0.5 %    Neutrophils, Absolute 4.62 2.00 - 8.60 10*3/mm3    Lymphocytes, Absolute 0.77 0.60 - 4.20 10*3/mm3    Monocytes, Absolute 0.40 0.00 - 0.90 10*3/mm3    Eosinophils, Absolute 0.15 0.00 - 0.70 10*3/mm3    Basophils, Absolute 0.03 0.00 - 0.20 10*3/mm3    Immature Grans, Absolute 0.01 0.00 - 0.02 10*3/mm3   Hepatitis B Surface Antigen    Collection Time: 11/09/18  7:22 AM   Result Value Ref Range    Hepatitis B Surface Ag Negative Negative   Urinalysis With Microscopic If Indicated  (No Culture) - Urine, Clean Catch    Collection Time: 11/09/18  9:33 AM   Result Value Ref Range    Color, UA Yellow Yellow, Straw, Dark Yellow, Arleen    Appearance, UA Cloudy (A) Clear    pH, UA 7.5 5.0 - 9.0    Specific Gravity, UA 1.009 1.003 - 1.030    Glucose, UA Negative Negative    Ketones, UA Negative Negative    Bilirubin, UA Negative Negative    Blood, UA Small (1+) (A) Negative    Protein, UA >=300 mg/dL (3+) (A) Negative    Leuk Esterase, UA Large (3+) (A) Negative    Nitrite, UA Negative Negative    Urobilinogen, UA 0.2 E.U./dL 0.2 - 1.0 E.U./dL   Urinalysis, Microscopic Only - Urine, Clean Catch    Collection Time: 11/09/18  9:33 AM   Result Value Ref Range    RBC, UA 21-30 (A) None Seen /HPF    WBC, UA Too Numerous to Count (A) None Seen, 0-2, 3-5 /HPF    Bacteria, UA 1+ (A) None Seen /HPF    Squamous Epithelial Cells, UA None Seen None Seen, 0-2 /HPF    Hyaline Casts, UA None Seen None Seen /LPF    Methodology Automated Microscopy    Troponin    Collection Time: 11/09/18 10:58 AM   Result Value Ref Range    Troponin I <0.012 <=0.034 ng/mL   ]      IMPRESSION:     End-stage renal disease  Hyperkalemia  Shortness of breath  Pleural effusion, pulmonary congestion  Hypertension  Anemia of chronic kidney disease  Abnormal UA, being evaluated for UTI, dialysis patient  Hyperphosphatemia  Secondary hyperparathyroidism  Peripheral vascular disease  Bilateral lower extremity amputations     PLAN:    ESRD.  Patient was seen and examined on dialysis.  Hemodialysis orders were reviewed.  She has mild hyperkalemia which is being addressed with dialysis.  Potassium was 5.5.  Not on ACE inhibitor's or ARB.  Low potassium diet.    Pleural effusion, shortness of breath.  Patient is on home oxygen.  She has refused thoracentesis.  Volume removal on dialysis as tolerated.  Unfortunately, difficult compliance.    Abnormal UA.  Dialysis patient.  Does make some urine.  Culture results to be monitored.    Essential  hypertension: Patient is on amlodipine, hydralazine and metoprolol.  Not on ACE inhibitor's or ARB due to hyperkalemia.    Anemia: H&H being monitored.  On iron supplements and erythropoietin injections.    Hyperphosphatemia.  On Renvela.  Last serum phosphorus was 6.7 on the previous admission.    Discussed with patient in dialysis.  Hemodialysis orders were reviewed.         MD JENNIFER Fraga/Transcription  : Some parts of this note dictated using Dragon transcription, with translation of spoken language to printed text.

## 2024-08-20 NOTE — HOSPITAL COURSE
"Per Dr. Esteban's HPI dated 8/19/2024:  \"Lorene Haro is a 48 y.o. female with a past medical history of atrial fibrillation on Eliquis, bipolar 2 disorder, COPD, chronic respiratory failure on 3 L of oxygen, hypertension, obesity, schizophrenia who presented 8/19/2024 with sudden onset of dizziness that started earlier today.  The patient states she has been feeling unwell for the past 2 days with symptoms of nausea and poor oral intake.  Today she developed sudden onset of dizziness and vertigo.  She also felt like she was going to pass out.  She felt unsteady and was unable to ambulate due to the symptoms.  She also fell landing backwards but denied any head injury or loss of consciousness.  She states she has been compliant with her medications.  She denies any alcohol use.  Patient also has been reporting some dysphagia since her symptoms started  In the ER her vitals were stable  CT head without contrast interpreted by me reveals no acute intracranial process  EKG interpreted by me reveals atrial fibrillation with a heart rate of 71 and incomplete right bundle branch block  Chest x-ray interpreted by me reveals no acute cardiopulmonary process\"     Pt was admitted for a further management of vertigo.     Hospital course under my care: afebrile and vitals wnl. BP soft. Orthostatic BP neg. Exam at bedside was grossly unremarkable. Labs and imagings findings discussed with patient. Recent Echo and NST reviewed. Tele rhythm overnight overall unrevealing.     MRI done was without acute intracranial abnormalities.   "

## 2024-08-20 NOTE — PROGRESS NOTES
Pt arrived to unit via transport and assumed care of patient at change of shift. Patient is A&Ox4, on RA, and reports no pain at this time. Patient assessment completed, bed in lowest position, and call light and personal belongings are within reach. Patient expressed no further needs at this time.

## 2024-08-20 NOTE — DISCHARGE PLANNING
Received request for HHC. Spoke with patient at bedside and discussed HHC. Patient has had HHC in the past. Choice form filled out with verbal consent and patient initialed. Choice form faxed to Fernando JAQUEZ. Message sent to Fernando JAQUEZ to update.

## 2024-08-20 NOTE — PROGRESS NOTES
4 Eyes Skin Assessment Completed by JC Asif and Branden, ACT-A.    Head Scab and Redness  Ears WDL  Nose WDL  Mouth WDL  Neck Redness  Breast/Chest Redness  Shoulder Blades WDL  Spine Bruising  (R) Arm/Elbow/Hand WDL  (L) Arm/Elbow/Hand WDL  Abdomen Redness under folds  Groin Redness  Scrotum/Coccyx/Buttocks Redness and Blanching  (R) Leg Swelling  (L) Leg Swelling  (R) Heel/Foot/Toe Blanching  (L) Heel/Foot/Toe Blanching          Devices In Places Tele Box and Pulse Ox      Interventions In Place Gray Ear Foams and Pillows      Pictures Uploaded Into Epic No, needs to be completed  Wound Consult Placed No  RN Wound Prevention Protocol Ordered Yes

## 2024-08-20 NOTE — CARE PLAN
The patient is Stable - Low risk of patient condition declining or worsening    Shift Goals  Clinical Goals: MRI, NPO for FEES, neuro checks  Patient Goals: sleep  Family Goals: megan    Progress made toward(s) clinical / shift goals:    Problem: Neuro Status  Goal: Neuro status will remain stable or improve  Outcome: Progressing     Problem: Urinary Elimination  Goal: Establish and maintain regular urinary output  Outcome: Progressing     Problem: Knowledge Deficit - Standard  Goal: Patient and family/care givers will demonstrate understanding of plan of care, disease process/condition, diagnostic tests and medications  Outcome: Progressing     Problem: Pain - Standard  Goal: Alleviation of pain or a reduction in pain to the patient’s comfort goal  Outcome: Progressing     Problem: Fall Risk  Goal: Patient will remain free from falls  Outcome: Progressing       Patient is not progressing towards the following goals:

## 2024-08-20 NOTE — THERAPY
"Physical Therapy   Initial Evaluation     Patient Name: Lorene Haro  Age:  48 y.o., Sex:  female  Medical Record #: [id number]  Today's Date: 8/20/2024     Precautions  Precautions: Fall Risk;Swallow Precautions    Assessment  Patient is a 48 y.o. female admitted with sudden onset of dizziness, nausea and poor oral intake. Pt had a fall backwards and was unable to ambulate. PMH includes atrial fibrillation on Eliquis, bipolar 2 disorder, COPD, chronic respiratory failure on 3 L of oxygen, hypertension, obesity, schizophrenia.     Patient received in bed and agreeable to PT session. PT required SBA-Donnie for mobility with 4WW. Pt able to ambulate to/from the bathroom; pt with antalgic gait, minor unsteadiness but no overt LOB. Pt is normally independent with mobility with a 4WW. Pt is primarily limited by pain and impaired functional strength, balance, activity tolerance, and cognition. Recommend post acute placement at this time unless pt can obtain increased level of assistance at group home. Will follow for acute care PT needs.     Plan    Physical Therapy Initial Treatment Plan   Treatment Plan : (P) Bed Mobility, Gait Training, Neuro Re-Education / Balance, Self Care / Home Evaluation, Therapeutic Activities, Therapeutic Exercise  Treatment Frequency: (P) 3 Times per Week  Duration: (P) Until Therapy Goals Met    DC Equipment Recommendations: (P) Unable to determine at this time  Discharge Recommendations: (P) Recommend post-acute placement for additional physical therapy services prior to discharge home       Subjective    \"I can't do anything with this arm because of the IV\".      Objective       08/20/24 0854   Initial Contact Note    Initial Contact Note Order Received and Verified, Physical Therapy Evaluation in Progress with Full Report to Follow.   Precautions   Precautions Fall Risk;Swallow Precautions   Vitals   Patient BP Position Sitting   Blood Pressure 104/51   O2 (LPM) 2   O2 Delivery Device " Nasal Cannula   Vitals Comments intermittently desats to 88-89% on RA, VSS   Pain 0 - 10 Group   Location Foot   Location Orientation Left;Right   Therapist Pain Assessment Post Activity Pain Same as Prior to Activity;Nurse Notified  (pain not rated)   Prior Living Situation   Prior Services Intermittent Physical Support for ADL Per Service   Housing / Facility Group Home   Equipment Owned 4-Wheel Walker   Lives with - Patient's Self Care Capacity Attendant / Paid Care Giver   Comments pt reports she lives in a group home. pt frustrated and reports she is independent at baseline but would like extra help, pt reports that staff does not assist her as much as other patients since she is more independent   Prior Level of Functional Mobility   Bed Mobility Independent   Transfer Status Independent   Ambulation Independent   Ambulation Distance limited community   Assistive Devices Used 4-Wheel Walker   Comments pt reports she is able to ambualte short distances with no AD   History of Falls   History of Falls Yes   Date of Last Fall   (part of reason for admit)   Cognition    Cognition / Consciousness X   Speech/ Communication Dysarthric   Level of Consciousness Alert   Ability To Follow Commands 2 Step   New Learning Impaired   Attention Impaired   Comments pleasant and participatory, self limiting at times   Active ROM Lower Body    Active ROM Lower Body  X   Comments BLE limited 2/2 pain, edema, weakness   Strength Lower Body   Lower Body Strength  X   Gross Strength Generalized Weakness, Equal Bilaterally   Comments limited by pain and edema   Lower Body Muscle Tone   Lower Body Muscle Tone  WDL   Balance Assessment   Sitting Balance (Static) Fair   Sitting Balance (Dynamic) Fair   Standing Balance (Static) Fair -   Standing Balance (Dynamic) Fair -   Weight Shift Sitting Fair   Weight Shift Standing Fair   Comments w/4WW   Bed Mobility    Supine to Sit Minimal Assist   Sit to Supine Standby Assist   Scooting  Standby Assist   Rolling Standby Assist   Comments HOB slightly elevated during sup > sit, bed flat when sit > supine   Gait Analysis   Gait Level Of Assist Contact Guard Assist   Assistive Device Front Wheel Walker   Distance (Feet) 50   # of Times Distance was Traveled 1   Deviation Antalgic;Bradykinetic;Increased Base Of Support;Decreased Heel Strike;Decreased Toe Off   Weight Bearing Status no restrictions   Functional Mobility   Sit to Stand Contact Guard Assist   Mobility bed <> bathroom   6 Clicks Assessment - How much HELP from from another person do you currently need... (If the patient hasn't done an activity recently, how much help from another person do you think he/she would need if he/she tried?)   Turning from your back to your side while in a flat bed without using bedrails? 3   Moving from lying on your back to sitting on the side of a flat bed without using bedrails? 3   Moving to and from a bed to a chair (including a wheelchair)? 3   Standing up from a chair using your arms (e.g., wheelchair, or bedside chair)? 3   Walking in hospital room? 3   Climbing 3-5 steps with a railing? 2   6 clicks Mobility Score 17   Short Term Goals    Short Term Goal # 1 Pt will be able to perform supine <> sit with bed flat and SPV in 6 visits to progress bed mobility   Short Term Goal # 2 Pt will be able to perform STS with 4WW and SPV in 6 visits to progress functional transfers   Short Term Goal # 3 Pt will be able to ambulate 150ft with 4WW and SPV in 6 visits to progress functional gait   Education Group   Education Provided Role of Physical Therapist   Role of Physical Therapist Patient Response Patient;Acceptance;Explanation;Verbal Demonstration   Additional Comments educated pt on importance of frequent mobility and mobilizing to a chair for meals   Physical Therapy Initial Treatment Plan    Treatment Plan  Bed Mobility;Gait Training;Neuro Re-Education / Balance;Self Care / Home Evaluation;Therapeutic  Activities;Therapeutic Exercise   Treatment Frequency 3 Times per Week   Duration Until Therapy Goals Met   Problem List    Problems Pain;Impaired Bed Mobility;Impaired Transfers;Impaired Ambulation;Functional ROM Deficit;Functional Strength Deficit;Impaired Balance;Decreased Activity Tolerance;Safety Awareness Deficits / Cognition   Anticipated Discharge Equipment and Recommendations   DC Equipment Recommendations Unable to determine at this time   Discharge Recommendations Recommend post-acute placement for additional physical therapy services prior to discharge home   Interdisciplinary Plan of Care Collaboration   IDT Collaboration with  Nursing;Occupational Therapist   Patient Position at End of Therapy In Bed;Bed Alarm On;Call Light within Reach;Tray Table within Reach;Phone within Reach   Collaboration Comments RN updated   Session Information   Date / Session Number  8/20 - 1 (1/3, 8/26)     Patient seen for team evaluation with Occupational Therapist for the following reason(s):  Patient required 2 person assistance for safety given limited mobility and BMI of 52 and to provide effective interventions. Each discipline assisted patient with appropriate and separate goals. Due to the medical complexity, the skill of both practitioners is needed to monitor vitals, patient status, and adjust the intervention to fit the patient's needs and goals. Therapy sessions needed to be done by a certain time period for both disciplines, and did not impede patient's progress.

## 2024-08-20 NOTE — THERAPY
"Speech Language Pathology   Flexible Endoscopic Evaluation of Swallowing (FEES)        Patient Name: Lorene Haro  AGE:  48 y.o., SEX:  female  Medical Record #: [id number]  Date of Service: 8/20/2024      History of Present Illness  47 y/o female admitted 8/19 with sudden onset of dizziness resulting in GLF. Reports 2d of malaise with nausea and poor intake.      CMHx: Vertigo, dysphagia  PMHx: afib, bipolar 2, COPD (chronic 3L), HTN, obesity     Seen by SLP at [facility] in 11/2022. FEES completed recommended MM5/MT2 given findings of silent aspiration of TN0. Seen by SLP in 1/2023 with recommendations for MBSS, which was not completed per notes available in EMR.      CT Head 8/19:  \"No acute intracranial abnormality.\"     CXR 8/19:  \"No acute cardiac or pulmonary abnormalities are identified. Lung volumes are low.\"      Pertinent Information  Current Method of Nutrition: NPO until cleared by speech pathology  Patient Behaviors: Anxious   Dentition: Poor, Scattered dentition, Upper denture, Denture(s) not available at time of evaluation   Feeding Tube: None   Tracheostomy: No   Factor(s) Affecting Performance: None     Discussed the risks, benefits, and alternatives of the FEES procedure. Patient/family acknowledged and agreed to proceed.      Assessment  Flexible Endoscopic Evaluation of Swallowing (FEES) completed at bedside today. The endoscope was passed transnasally via Right nare to evaluate the anatomy and physiology of swallowing. Pt tolerated the procedure with no apparent distress.    Anatomic Findings: Diffuse laryngeal and pharyngeal edema; slight white rice-like coating on BOT, query thrush  Vocal Fold Motion: Bilateral movement  Secretion Management: Adequate  PO Trials: Ice Chips, Thin Liquid, Mildly Thick Liquid, Liquidised, Pudding, Regular Solid, Mixed      Consistency PAS Score Timing Residue Comments   Thin Liquid 5 During swallow Vallecular Residue: Mild (5%-25%)  Pyriform Sinus Residue: Trace " (1%-5%) Tsp, cup - PAS 1  Straw - PAS 4, PAS 5 (trace)   Mildly Thick 1 N/A Vallecular Residue: Trace (1%-5%)  Pyriform Sinus Residue: Trace (1%-5%) Cup, straw   Liquidised 1 N/A Vallecular Residue: Trace (1%-5%)  Pyriform Sinus Residue: Trace (1%-5%)    Pudding 1 N/A Vallecular Residue: Mild (5%-25%)  Pyriform Sinus Residue: Trace (1%-5%)    Regular Solid 1 N/A Vallecular Residue: Trace (1%-5%)  Pyriform Sinus Residue: Trace (1%-5%)    Mixed 1 N/A Vallecular Residue: Mild (5%-25%)  Pyriform Sinus Residue: Mild (5%-25%) SB6 peaches, TN0 juice     Penetration-Aspiration Scale (PAS)  1     No contrast enters airway  2     Contrast enters the airway, remains above the vocal folds, and is ejected from the airway (not seen in the airway at the end of the swallow).  3     Contrast enters the airway, remains above the vocal folds, and is not ejected from the airway (is seen in the airway after the swallow).  4     Contrast enters the airway, contacts the vocal folds, and is ejected from the airway.  5     Contrast enters the airway, contacts the vocal folds, and is not ejected from the airway  6     Contrast enters the airway, crosses the plane of the vocal folds, and is ejected from the airway.  7     Contrast enters the airway, crosses the plane of the vocal folds, and is not ejected from the airway despite effort.  8     Contrast enters the airway, crosses the plane of the vocal folds, is not ejected from the airway and there is no response to aspiration.      Oral phase:  Incomplete mastication of SB6 pieces from trials of mixed. Otherwise unremarkable.       Pharyngeal phase:  Pharyngeal phase characterized by impairments in BOT retraction, laryngeal vestibule closure (LVC), and PES opening, which resulted in the following:  - Trace deep penetration (to the vocal folds) with TN0, which did clear spontaneously with either a cough or cleansing swallow  - Mild vallecular residue with TN0, PU4, and mixed that did clear  "spontaneously  - Mild pyriform sinus residue with mixed that did clear spontaneously      Compensatory Strategies:  Cleansing swallow - EFFECTIVE to clear stasis; EFFECTIVE to clear trace penetrate  Cough - EFFECTIVE to clear trace penetrate      Severity Rating:  SHELBY: Mild      Clinical Impressions  The pt presents with a mild oropharyngeal dysphagia, likely acute on chronic related to neuro symptoms and history of dysphagia. Swallow safety and efficiency are both mildly impaired. Would recommend a RG/TN diet with reflux precautions at this time. Patient is an excellent candidate for behavioral swallow rehabilitation. Dysphagia outcomes can be maximized with use of mobility as pt is able and frequent, thorough oral care.      Recommendations  Regular solids / thin liquids  Medication: As tolerated  Supervision: Independent  Positioning: Fully upright and midline during oral intake, Remain upright for 30-45 minutes after oral intake, Meals sitting upright in a chair, as tolerated  Strategies: Small bites/sips, Slow rate of intake, Periodic throat clear / cough okay  Oral Care: Q4h  Additional Instrumentation: None       SLP Treatment Plan  Treatment Plan: Dysphagia Treatment, Patient/Family/Caregiver Training  SLP Frequency: 2x Per Week  Estimated Duration: Until Therapy Goals Met      Anticipated Discharge Needs  Discharge Recommendations: Recommend outpatient speech therapy services   Therapy Recommendations Upon DC: Dysphagia Training, Cognitive-Linguistic Training, Patient / Family / Caregiver Education, Community Re-Integration       Patient / Family Goals  Patient / Family Goal #1: \"Can I have some pudding?\"  Goal #1 Outcome: Goal met  Short Term Goal # 1: Pt will complete FEES w SLP to further evaluate swallow function and inform POC.  Goal Outcome # 1: Goal met, new goal added  Short Term Goal # 1 B : Pt will consume RG/TN diet with no worsening of respiratory status.      Renae Joy, SLP  "

## 2024-08-20 NOTE — DISCHARGE PLANNING
Care Transition Team Assessment      Spoke with patient at bedside and verified all information, Lives at [facility] and plan is to return. Uses Walker to Ambulate. Uses 2L at night, Unable to recall name of DME company. PCP is a [facility]. Uses [facility].      Information Source  Orientation Level: Oriented X4  Information Given By: Patient    Readmission Evaluation  Is this a readmission?: No      Interdisciplinary Discharge Planning  Primary Care Physician: [facility]  Lives with - Patient's Self Care Capacity: Other (Comments) ([facility])  Patient or legal guardian wants to designate a caregiver: No  Support Systems: Family Member(s), Other (Comments) ( staff)  Housing / Facility: [facility]  Name of Care Facility: [facility]  Do You Take your Prescribed Medications Regularly: Yes  Able to Return to Previous ADL's: Future Time w/Therapy  Mobility Issues: Yes  Prior Services: Other (Comments)  Patient Prefers to be Discharged to:: Back to [facility]  Assistance Needed: Yes  Durable Medical Equipment: Home Oxygen, Walker  DME Provider / Phone: Unable to recall name    Discharge Preparedness  What are your discharge supports?: Other (comment) ( staff)  Prior Functional Level: Uses Walker    Functional Assesment  Prior Functional Level: Uses Walker    Finances  Prescription Coverage: Yes    Anticipated Discharge Information  Discharge Disposition: D/T to home under A care in anticipation of covered skilled care (06)  Discharge Address: [address]  Discharge Contact Phone Number: [phone]

## 2024-08-20 NOTE — PROGRESS NOTES
Report received from NOC RN, assumed pt care. Assessment completed. VSS. C/o pain in legs/feet - denies need for medication. Repositioned for comfort. Orthostatics performed - negative. Pt up to commode - became tired quickly and is generally weak. Call light and fall precautions in place. Pt set up for breakfast - no needs at this time.

## 2024-08-21 NOTE — DISCHARGE SUMMARY
"Discharge Summary    CHIEF COMPLAINT ON ADMISSION  Chief Complaint   Patient presents with    Dizziness     Since this morning. States has been feeling unwell for the last 2 days. States this morning fell backwards, landing on buttocks, -loc -head strike. Hx afib, takes eliquis.        Reason for Admission  Dizziness     Admission Date  8/19/2024    CODE STATUS  Full Code    HPI & HOSPITAL COURSE  Per Dr. Esteban's HPI dated 8/19/2024:  \"Lorene Haro is a 48 y.o. female with a past medical history of atrial fibrillation on Eliquis, bipolar 2 disorder, COPD, chronic respiratory failure on 3 L of oxygen, hypertension, obesity, schizophrenia who presented 8/19/2024 with sudden onset of dizziness that started earlier today.  The patient states she has been feeling unwell for the past 2 days with symptoms of nausea and poor oral intake.  Today she developed sudden onset of dizziness and vertigo.  She also felt like she was going to pass out.  She felt unsteady and was unable to ambulate due to the symptoms.  She also fell landing backwards but denied any head injury or loss of consciousness.  She states she has been compliant with her medications.  She denies any alcohol use.  Patient also has been reporting some dysphagia since her symptoms started  In the ER her vitals were stable  CT head without contrast interpreted by me reveals no acute intracranial process  EKG interpreted by me reveals atrial fibrillation with a heart rate of 71 and incomplete right bundle branch block  Chest x-ray interpreted by me reveals no acute cardiopulmonary process\"     Pt was admitted for a further management of vertigo.     Hospital course under my care: afebrile and vitals wnl. BP soft. Orthostatic BP neg. Exam at bedside was grossly unremarkable. Labs and imagings findings discussed with patient. Recent Echo and NST reviewed. Tele rhythm overnight overall unrevealing. MRI done was without acute intracranial abnormalities. " Pt reported symptoms improvement.     We discussed about likely a peripheral causes of vertigo - a trial of meclizine. Pt was seen by PT/OT - home with home health/OT/PT is ok. The patient is deemed stable for discharge at this point.     Therefore, she is discharged in fair and stable condition to home with close outpatient follow-up.    The patient recovered much more quickly than anticipated on admission.    Discharge Date  08/20/24    FOLLOW UP ITEMS POST DISCHARGE  N/A    DISCHARGE DIAGNOSES  Principal Problem:    Vertigo (POA: Yes)  Active Problems:    KATHIA (obstructive sleep apnea) (POA: Yes)    ?possible Schizophrenia vs Bipolar? 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 *      FOLLOW UP  Future Appointments   Date Time Provider Department Center   10/30/2024 12:00 PM PFT-RM3 [facility] None   11/7/2024 10:30 AM [facility] None   11/20/2024  2:00 PM [facility] EXAM 4 [facility] None   12/2/2024  3:40 PM Oz Borrego M.D. [facility] None   12/6/2024  4:00 PM [facility] None     No follow-up provider specified.    MEDICATIONS ON DISCHARGE     Medication List        CONTINUE taking these medications        Instructions   Advair Diskus 100-50 MCG/ACT Aepb  Generic drug: fluticasone-salmeterol   Doctor's comments: 3 month supply x 1 year  Inhale 1 Puff 2 times a day.  Dose: 1 Puff     apixaban 5mg Tabs  Commonly known as: Eliquis   Take 1 Tablet by mouth 2 times a day.  Dose: 5 mg     aripiprazole 30 MG tablet  Commonly known as: Abilify   Take 1 Tablet by mouth every morning. Indications: Major  Depressive Disorder  Dose: 30 mg     atorvastatin 20 MG Tabs  Commonly known as: Lipitor   Take 1 Tablet by mouth at bedtime.  Dose: 20 mg     fluticasone 50 MCG/ACT nasal spray  Commonly known as: Flonase   Administer 2 Sprays into each nostril every morning.  Dose: 2 Spray     gabapentin 300 MG Caps  Commonly known as: Neurontin   Take 300 mg by mouth 3 times a day.  Dose: 300 mg     Home Care Oxygen   Inhale 2 L/min every evening.  Dose: 2 L/min     lidocaine 5 % Ptch  Commonly known as: Lidoderm   Place 1 Patch on the skin every 12 hours.  Dose: 1 Patch     meclizine 25 MG Tabs  Commonly known as: Antivert   Take 1 Tablet by mouth 3 times a day as needed for Vertigo or Dizziness.  Dose: 25 mg     metoprolol SR 25 MG Tb24  Commonly known as: Toprol XL   Take 25 mg by mouth every day.  Dose: 25 mg     montelukast 10 MG Tabs  Commonly known as: Singulair   Doctor's comments: 3 month supply x 1 year  Take 1 Tablet by mouth every evening.  Dose: 10 mg     nicotine 14 MG/24HR Pt24  Commonly known as: Nicoderm   Place 1 Patch on the skin every 24 hours.  Dose: 1 Patch     omeprazole 20 MG delayed-release capsule  Commonly known as: PriLOSEC   Take 1 Capsule by mouth 2 times a day.  Dose: 20 mg     ondansetron 4 MG Tbdp  Commonly known as: Zofran ODT   Dissolve 1 Tablet by mouth every 6 hours as needed for Nausea/Vomiting.  Dose: 4 mg            STOP taking these medications      acetaminophen 500 MG Tabs  Commonly known as: Tylenol     albuterol 108 (90 Base) MCG/ACT Aers inhalation aerosol     cefdinir 300 MG Caps  Commonly known as: Omnicef     dicyclomine 20 MG Tabs  Commonly known as: Bentyl     furosemide 20 MG Tabs  Commonly known as: Lasix     methocarbamol 750 MG Tabs  Commonly known as: Robaxin     traMADol 50 MG Tabs  Commonly known as: Ultram              Allergies  Allergies   Allergen Reactions    Penicillin G Rash and Itching     pt reports that she gets a rash all over her body and gets itchy     Amoxicillin Rash and Itching     Tolerates cephalosporins, pt reports that she gets a rash all over her body and gets itchy       DIET  Orders Placed This Encounter   Procedures    Diet Order Diet: Regular     Standing Status:   Standing     Number of Occurrences:   1     Order Specific Question:   Diet:     Answer:   Regular [1]       ACTIVITY  As tolerated.  Weight bearing as tolerated    CONSULTATIONS  N/A    PROCEDURES  N/A    LABORATORY  Lab Results   Component Value Date    SODIUM 141 08/20/2024    POTASSIUM 4.9 08/20/2024    CHLORIDE 106 08/20/2024    CO2 28 08/20/2024    GLUCOSE 97 08/20/2024    BUN 11 08/20/2024    CREATININE 0.88 08/20/2024    CREATININE 0.9 09/11/2008        Lab Results   Component Value Date    WBC 9.1 08/20/2024    HEMOGLOBIN 11.6 (L) 08/20/2024    HEMATOCRIT 38.6 08/20/2024    PLATELETCT 204 08/20/2024        Total time of the discharge process exceeds 35 minutes.

## 2024-08-21 NOTE — DISCHARGE PLANNING
NEREIDA RN received a call from [place] with [facility] stating she has accepted referral.

## 2024-08-21 NOTE — PROGRESS NOTES
PIV D/C'd.  Discharge instructions provided to pt.  Pt states understanding.  Pt states all questions have been answered.  Copy of discharge provided to pt.  Signed copy in chart. Pt states that all personal belongings are in possession.  Pt escorted off unit with assistance from CINDY via hamida without incident.

## 2024-08-24 ENCOUNTER — HOSPITAL ENCOUNTER (EMERGENCY)
Facility: MEDICAL CENTER | Age: 49
End: 2024-08-24
Attending: EMERGENCY MEDICINE
Payer: MEDICAID

## 2024-08-24 VITALS
WEIGHT: 278.88 LBS | OXYGEN SATURATION: 95 % | BODY MASS INDEX: 52.65 KG/M2 | SYSTOLIC BLOOD PRESSURE: 110 MMHG | RESPIRATION RATE: 15 BRPM | HEART RATE: 75 BPM | DIASTOLIC BLOOD PRESSURE: 56 MMHG | TEMPERATURE: 97 F | HEIGHT: 61 IN

## 2024-08-24 DIAGNOSIS — B34.9 VIRAL SYNDROME: ICD-10-CM

## 2024-08-24 LAB
FLUAV RNA SPEC QL NAA+PROBE: NEGATIVE
FLUBV RNA SPEC QL NAA+PROBE: NEGATIVE
RSV RNA SPEC QL NAA+PROBE: NEGATIVE
S PYO DNA SPEC NAA+PROBE: NOT DETECTED
SARS-COV-2 RNA RESP QL NAA+PROBE: NOTDETECTED

## 2024-08-24 PROCEDURE — 99283 EMERGENCY DEPT VISIT LOW MDM: CPT

## 2024-08-24 PROCEDURE — 87651 STREP A DNA AMP PROBE: CPT

## 2024-08-24 PROCEDURE — 0241U HCHG SARS-COV-2 COVID-19 NFCT DS RESP RNA 4 TRGT ED POC: CPT

## 2024-08-24 ASSESSMENT — FIBROSIS 4 INDEX: FIB4 SCORE: 0.66

## 2024-08-25 NOTE — ED NOTES
Patient ambulated to room with assistance of walker. Patient  on monitor, with call light in reach. Chart up for next available ERP.

## 2024-08-25 NOTE — ED TRIAGE NOTES
Chief Complaint   Patient presents with    Sore Throat     Pt emelia krishnamurthy from group home [place] c/o sorethroat since yesterday and states hurts to swallow    Cough     Reports dry cough since yesterday.     Concern about possible covid. Also c/o ringing in her both ears. Pt provided with mask by ems. NAD.  Vitals:    08/24/24 1858   BP: 133/59   Pulse: 73   Resp: 16   Temp: 36 °C (96.8 °F)   SpO2: 94%

## 2024-08-25 NOTE — ED NOTES
Pt discharged to lobby with steady gait using personal walker. GCS 15. Pt in possession of belongings. Pt provided discharge education and information pertaining to medications and follow up appointments. Pt received copy of discharge instructions and verbalized understanding.     Vitals:    08/24/24 2002   BP: 110/56   Pulse: 75   Resp: 15   Temp: 36.1 °C (97 °F)   SpO2: 95%

## 2024-08-25 NOTE — ED PROVIDER NOTES
CHIEF COMPLAINT  Chief Complaint   Patient presents with    Sore Throat     Pt bib remsa from group home [place] c/o sorethroat since yesterday and states hurts to swallow    Cough     Reports dry cough since yesterday.       LIMITATION TO HISTORY   Select: none    HPI    Lorene Haro is a 48 y.o. female who presents to the Emergency Department for evaluation of flu-like symptoms onset one day ago. The patient reports she is experiencing sore throat, cough, chills, rhinorrhea, and muscle aches. She confirms she has had contact two weeks ago with a person who had the flu. No additional pain or symptoms noted at this time.    OUTSIDE HISTORIAN(S):  Select: None.     EXTERNAL RECORDS REVIEWED  Select: Patient was last seen in the ED for evaluation of dizziness. Patient has medical history of atrial fibrillation on Eliquis, bipolar 2 disorder, COPD, chronic respiratory failure on 3 L of oxygen, hypertension, obesity, and schizophrenia.     PAST MEDICAL HISTORY  Past Medical History:   Diagnosis Date    A-fib ([facility])     MONI (acute kidney injury) ([facility]) 2023    Asthma     Bipolar 2 disorder (HCC)     Chickenpox     Chronic obstructive pulmonary disease (HCC)     Hypertension     On home oxygen therapy     Other psychological stress     Schizophrenic disorder ([facility])     Yeast infection of the skin 2021     .    SURGICAL HISTORY  Past Surgical History:   Procedure Laterality Date    CHOLECYSTECTOMY      COLECTOMY      HYSTERECTOMY LAPAROSCOPY      KNEE ARTHROSCOPY Left          FAMILY HISTORY  Family History   Problem Relation Age of Onset    No Known Problems Mother     Cancer Sister           SOCIAL HISTORY  Social History     Tobacco Use    Smoking status: Former     Current packs/day: 0.00     Types: Cigarettes     Quit date: 10/12/2022     Years since quittin.8    Smokeless tobacco: Never   Vaping Use    Vaping status: Never Used   Substance Use Topics    Alcohol use: Not Currently    Drug use: Never  "        CURRENT MEDICATIONS  No current facility-administered medications noted prior to encounter.     Current Outpatient Medications on File Prior to Encounter   Medication Sig Dispense Refill    ondansetron (ZOFRAN ODT) 4 MG TABLET DISPERSIBLE Dissolve 1 Tablet by mouth every 6 hours as needed for Nausea/Vomiting. 10 Tablet 0    meclizine (ANTIVERT) 25 MG Tab Take 1 Tablet by mouth 3 times a day as needed for Vertigo or Dizziness. 30 Tablet 0    lidocaine (LIDODERM) 5 % Patch Place 1 Patch on the skin every 12 hours.      omeprazole (PRILOSEC) 20 MG delayed-release capsule Take 1 Capsule by mouth 2 times a day. 60 Capsule 0    nicotine (NICODERM) 14 MG/24HR PATCH 24 HR Place 1 Patch on the skin every 24 hours.      gabapentin (NEURONTIN) 300 MG Cap Take 300 mg by mouth 3 times a day.      Home Care Oxygen Inhale 2 L/min every evening.      ADVAIR DISKUS 100-50 MCG/ACT AEROSOL POWDER, BREATH ACTIVATED Inhale 1 Puff 2 times a day. 180 Each 3    montelukast (SINGULAIR) 10 MG Tab Take 1 Tablet by mouth every evening. 90 Tablet 3    metoprolol SR (TOPROL XL) 25 MG TABLET SR 24 HR Take 25 mg by mouth every day.      apixaban (ELIQUIS) 5mg Tab Take 1 Tablet by mouth 2 times a day. 200 Tablet 3    fluticasone (FLONASE) 50 MCG/ACT nasal spray Administer 2 Sprays into each nostril every morning.      aripiprazole (ABILIFY) 30 MG tablet Take 1 Tablet by mouth every morning. Indications: Major Depressive Disorder 30 Tablet 0    atorvastatin (LIPITOR) 20 MG Tab Take 1 Tablet by mouth at bedtime. 30 Tablet 0         ALLERGIES  Allergies   Allergen Reactions    Penicillin G Rash and Itching     pt reports that she gets a rash all over her body and gets itchy    Amoxicillin Rash and Itching     Tolerates cephalosporins, pt reports that she gets a rash all over her body and gets itchy       PHYSICAL EXAM  VITAL SIGNS:/59   Pulse 73   Temp 36 °C (96.8 °F) (Temporal)   Resp 16   Ht 1.549 m (5' 1\")   Wt (!) 126 kg (278 lb " 14.1 oz)   LMP  (LMP Unknown)   SpO2 94%   BMI 52.69 kg/m²     Constitutional: Well-developed no acute distress   HENT: Normocephalic, Atraumatic, Bilateral external ears normal. Cerumen impaction in left ear.   Eyes:  conjunctiva are normal.   Neck: Supple.  Nontender midline  Cardiovascular: Regular rate and rhythm without murmurs gallops or rubs.   Thorax & Lungs: No respiratory distress. Breathing comfortably. Lungs are clear to auscultation bilaterally, there are no wheezes no rales. Chest wall is nontender.  Abdomen: Soft, non distended, non tender   Skin: Warm, Dry, No erythema. Eczematous changes to scalp.   Back: No tenderness, No CVA tenderness.  Musculoskeletal: No clubbing cyanosis or edema good range of motion   Neurologic: Alert & oriented x 3, normal sensation moving all extremities appears normal   Psychiatric: Affect normal, Judgment normal, Mood normal.     DIAGNOSTIC STUDIES / PROCEDURES    LABS  Results for orders placed or performed during the hospital encounter of 08/24/24   Group A Strep by PCR    Specimen: Throat   Result Value Ref Range    Group A Strep by PCR Not Detected Not Detected   POC CoV-2, FLU A/B, RSV by PCR   Result Value Ref Range    POC Influenza A RNA, PCR Negative Negative    POC Influenza B RNA, PCR Negative Negative    POC RSV, by PCR Negative Negative    POC SARS-CoV-2, PCR NotDetected NotDetected     *Note: Due to a large number of results and/or encounters for the requested time period, some results have not been displayed. A complete set of results can be found in Results Review.     COURSE & MEDICAL DECISION MAKING    ED COURSE:    ED Observation Status? No, The patient does not qualify for observation status     INTERVENTIONS BY ME:  Medications - No data to display    7:37 PM - Patient seen and examined at bedside. Discussed plan of care, including screening for Strep, COVID-19, and flu. Patient agrees to the plan of care. Ordered for POCT CoV-2, Flu A/B and Group  A Strep to evaluate her symptoms.     8:30 PM -  I reevaluated the patient at bedside. I discussed the patient's diagnostic study results which show to be undetected and negative for CoV-2, Flu A/B, and Strep Throat. I discussed plan for discharge and follow up as outlined below. The patient is stable for discharge at this time and will return for any new or worsening symptoms. Patient verbalizes understanding and support with my plan for discharge.     INITIAL ASSESSMENT, COURSE AND PLAN  Care Narrative: Given the patient's symptomatology, the likelihood of a viral illness is high. The patient understand that the immune system is built to clear this type of infection.  The patient understand that antibiotics will not change the course of this type of infection and that the patient's immune system is well suited to find this type of infection. The mainstay of therapy for viral infections is copious fluids, rest, fever control and frequent hand washing to avoid spread of the illness.  We discussed the use of OTC medications such as Tylenol and ibuprofen to treat the patient's symptoms cool mist humidifier in the patient's bedroom will keep his mucous membranes moist.       DISPOSITION AND DISCUSSIONS  I have discussed management of the patient with the following physicians and JAY's: None    Escalation of care considered, and ultimately not performed: None    Barriers to care at this time, including but not limited to: None.     Decision tools and prescription drugs considered including, but not limited to: As described above.     DISPOSITION:  Patient will be discharged home with parent in stable condition.    FOLLOW UP:  [facility] ([facility]) - Primary Care and Family Medicine  [address] [facility] [address]  [phone]        [facility]  [address] [facility] [address]  [phone]        Parent was given return precautions and verbalizes  understanding. Parent will return with patient for new or worsening symptoms.       FINAL DIAGNOSIS  1. Viral syndrome       Gabby HARRIS (Scribraul), am scribing for, and in the presence of, Aron Delacruz M.D..    Electronically signed by: Gabby Metcalf ([employer]on[employer]raul), 8/24/2024    Aron HARRIS M.D. personally performed the services described in this documentation, as scribed by Gabby Metcalf in my presence, and it is both accurate and complete.     Electronically signed by: Aron Delacruz M.D.,8:55 PM 08/24/24

## 2024-08-28 ENCOUNTER — APPOINTMENT (OUTPATIENT)
Dept: RADIOLOGY | Facility: MEDICAL CENTER | Age: 49
End: 2024-08-28
Attending: STUDENT IN AN ORGANIZED HEALTH CARE EDUCATION/TRAINING PROGRAM
Payer: MEDICAID

## 2024-08-28 ENCOUNTER — HOSPITAL ENCOUNTER (EMERGENCY)
Facility: MEDICAL CENTER | Age: 49
End: 2024-08-29
Attending: STUDENT IN AN ORGANIZED HEALTH CARE EDUCATION/TRAINING PROGRAM
Payer: MEDICAID

## 2024-08-28 DIAGNOSIS — J18.9 PNEUMONIA OF LEFT LUNG DUE TO INFECTIOUS ORGANISM, UNSPECIFIED PART OF LUNG: ICD-10-CM

## 2024-08-28 LAB
ALBUMIN SERPL BCP-MCNC: 3.8 G/DL (ref 3.2–4.9)
ALBUMIN/GLOB SERPL: 1.5 G/DL
ALP SERPL-CCNC: 155 U/L (ref 30–99)
ALT SERPL-CCNC: 13 U/L (ref 2–50)
ANION GAP SERPL CALC-SCNC: 8 MMOL/L (ref 7–16)
AST SERPL-CCNC: 14 U/L (ref 12–45)
BASOPHILS # BLD AUTO: 0.3 % (ref 0–1.8)
BASOPHILS # BLD: 0.03 K/UL (ref 0–0.12)
BILIRUB SERPL-MCNC: 0.5 MG/DL (ref 0.1–1.5)
BUN SERPL-MCNC: 12 MG/DL (ref 8–22)
CALCIUM ALBUM COR SERPL-MCNC: 9.6 MG/DL (ref 8.5–10.5)
CALCIUM SERPL-MCNC: 9.4 MG/DL (ref 8.5–10.5)
CHLORIDE SERPL-SCNC: 108 MMOL/L (ref 96–112)
CO2 SERPL-SCNC: 28 MMOL/L (ref 20–33)
CREAT SERPL-MCNC: 0.86 MG/DL (ref 0.5–1.4)
EKG IMPRESSION: NORMAL
EOSINOPHIL # BLD AUTO: 0.09 K/UL (ref 0–0.51)
EOSINOPHIL NFR BLD: 0.8 % (ref 0–6.9)
ERYTHROCYTE [DISTWIDTH] IN BLOOD BY AUTOMATED COUNT: 51.3 FL (ref 35.9–50)
GFR SERPLBLD CREATININE-BSD FMLA CKD-EPI: 82 ML/MIN/1.73 M 2
GLOBULIN SER CALC-MCNC: 2.6 G/DL (ref 1.9–3.5)
GLUCOSE SERPL-MCNC: 106 MG/DL (ref 65–99)
HCG SERPL QL: NEGATIVE
HCT VFR BLD AUTO: 38 % (ref 37–47)
HGB BLD-MCNC: 11.8 G/DL (ref 12–16)
IMM GRANULOCYTES # BLD AUTO: 0.05 K/UL (ref 0–0.11)
IMM GRANULOCYTES NFR BLD AUTO: 0.4 % (ref 0–0.9)
LYMPHOCYTES # BLD AUTO: 1.97 K/UL (ref 1–4.8)
LYMPHOCYTES NFR BLD: 16.7 % (ref 22–41)
MCH RBC QN AUTO: 27.3 PG (ref 27–33)
MCHC RBC AUTO-ENTMCNC: 31.1 G/DL (ref 32.2–35.5)
MCV RBC AUTO: 87.8 FL (ref 81.4–97.8)
MONOCYTES # BLD AUTO: 0.76 K/UL (ref 0–0.85)
MONOCYTES NFR BLD AUTO: 6.4 % (ref 0–13.4)
NEUTROPHILS # BLD AUTO: 8.9 K/UL (ref 1.82–7.42)
NEUTROPHILS NFR BLD: 75.4 % (ref 44–72)
NRBC # BLD AUTO: 0 K/UL
NRBC BLD-RTO: 0 /100 WBC (ref 0–0.2)
PLATELET # BLD AUTO: 246 K/UL (ref 164–446)
PMV BLD AUTO: 11.6 FL (ref 9–12.9)
POTASSIUM SERPL-SCNC: 4.1 MMOL/L (ref 3.6–5.5)
PROT SERPL-MCNC: 6.4 G/DL (ref 6–8.2)
RBC # BLD AUTO: 4.33 M/UL (ref 4.2–5.4)
SODIUM SERPL-SCNC: 144 MMOL/L (ref 135–145)
TROPONIN T SERPL-MCNC: 30 NG/L (ref 6–19)
TROPONIN T SERPL-MCNC: 31 NG/L (ref 6–19)
WBC # BLD AUTO: 11.8 K/UL (ref 4.8–10.8)

## 2024-08-28 PROCEDURE — 80053 COMPREHEN METABOLIC PANEL: CPT

## 2024-08-28 PROCEDURE — 700111 HCHG RX REV CODE 636 W/ 250 OVERRIDE (IP): Mod: UD | Performed by: STUDENT IN AN ORGANIZED HEALTH CARE EDUCATION/TRAINING PROGRAM

## 2024-08-28 PROCEDURE — 36415 COLL VENOUS BLD VENIPUNCTURE: CPT

## 2024-08-28 PROCEDURE — 71045 X-RAY EXAM CHEST 1 VIEW: CPT

## 2024-08-28 PROCEDURE — 93005 ELECTROCARDIOGRAM TRACING: CPT

## 2024-08-28 PROCEDURE — 84703 CHORIONIC GONADOTROPIN ASSAY: CPT

## 2024-08-28 PROCEDURE — 84484 ASSAY OF TROPONIN QUANT: CPT

## 2024-08-28 PROCEDURE — 85025 COMPLETE CBC W/AUTO DIFF WBC: CPT

## 2024-08-28 PROCEDURE — 99285 EMERGENCY DEPT VISIT HI MDM: CPT

## 2024-08-28 PROCEDURE — 93005 ELECTROCARDIOGRAM TRACING: CPT | Performed by: STUDENT IN AN ORGANIZED HEALTH CARE EDUCATION/TRAINING PROGRAM

## 2024-08-28 PROCEDURE — A9270 NON-COVERED ITEM OR SERVICE: HCPCS | Mod: UD | Performed by: STUDENT IN AN ORGANIZED HEALTH CARE EDUCATION/TRAINING PROGRAM

## 2024-08-28 PROCEDURE — 700101 HCHG RX REV CODE 250: Mod: UD | Performed by: STUDENT IN AN ORGANIZED HEALTH CARE EDUCATION/TRAINING PROGRAM

## 2024-08-28 PROCEDURE — 700102 HCHG RX REV CODE 250 W/ 637 OVERRIDE(OP): Mod: UD | Performed by: STUDENT IN AN ORGANIZED HEALTH CARE EDUCATION/TRAINING PROGRAM

## 2024-08-28 RX ORDER — ACETAMINOPHEN 500 MG
1000 TABLET ORAL ONCE
Status: COMPLETED | OUTPATIENT
Start: 2024-08-28 | End: 2024-08-28

## 2024-08-28 RX ORDER — LIDOCAINE 4 G/G
1 PATCH TOPICAL ONCE
Status: DISCONTINUED | OUTPATIENT
Start: 2024-08-28 | End: 2024-08-29 | Stop reason: HOSPADM

## 2024-08-28 RX ORDER — DIAZEPAM 2 MG
2 TABLET ORAL ONCE
Status: COMPLETED | OUTPATIENT
Start: 2024-08-28 | End: 2024-08-28

## 2024-08-28 RX ORDER — MECLIZINE HYDROCHLORIDE 25 MG/1
25 TABLET ORAL ONCE
Status: COMPLETED | OUTPATIENT
Start: 2024-08-28 | End: 2024-08-28

## 2024-08-28 RX ORDER — ONDANSETRON 4 MG/1
4 TABLET, ORALLY DISINTEGRATING ORAL ONCE
Status: COMPLETED | OUTPATIENT
Start: 2024-08-28 | End: 2024-08-28

## 2024-08-28 RX ADMIN — ONDANSETRON 4 MG: 4 TABLET, ORALLY DISINTEGRATING ORAL at 23:49

## 2024-08-28 RX ADMIN — ACETAMINOPHEN 1000 MG: 500 TABLET ORAL at 21:55

## 2024-08-28 RX ADMIN — DIAZEPAM 2 MG: 2 TABLET ORAL at 23:49

## 2024-08-28 RX ADMIN — MECLIZINE HYDROCHLORIDE 25 MG: 25 TABLET ORAL at 21:55

## 2024-08-28 RX ADMIN — LIDOCAINE 1 PATCH: 4 PATCH TOPICAL at 21:54

## 2024-08-28 ASSESSMENT — FIBROSIS 4 INDEX: FIB4 SCORE: 0.66

## 2024-08-29 VITALS
WEIGHT: 278 LBS | SYSTOLIC BLOOD PRESSURE: 101 MMHG | OXYGEN SATURATION: 100 % | DIASTOLIC BLOOD PRESSURE: 54 MMHG | TEMPERATURE: 97.5 F | RESPIRATION RATE: 20 BRPM | BODY MASS INDEX: 52.49 KG/M2 | HEART RATE: 61 BPM | HEIGHT: 61 IN

## 2024-08-29 PROCEDURE — A9270 NON-COVERED ITEM OR SERVICE: HCPCS | Mod: UD | Performed by: STUDENT IN AN ORGANIZED HEALTH CARE EDUCATION/TRAINING PROGRAM

## 2024-08-29 PROCEDURE — 700102 HCHG RX REV CODE 250 W/ 637 OVERRIDE(OP): Mod: UD | Performed by: STUDENT IN AN ORGANIZED HEALTH CARE EDUCATION/TRAINING PROGRAM

## 2024-08-29 RX ORDER — AZITHROMYCIN 250 MG/1
500 TABLET, FILM COATED ORAL ONCE
Status: COMPLETED | OUTPATIENT
Start: 2024-08-29 | End: 2024-08-29

## 2024-08-29 RX ORDER — AZITHROMYCIN 500 MG/1
250 TABLET, FILM COATED ORAL DAILY
Qty: 2 TABLET | Refills: 0 | Status: ACTIVE | OUTPATIENT
Start: 2024-08-29 | End: 2024-09-02

## 2024-08-29 RX ORDER — CEFDINIR 300 MG/1
300 CAPSULE ORAL 2 TIMES DAILY
Qty: 9 CAPSULE | Refills: 0 | Status: ACTIVE | OUTPATIENT
Start: 2024-08-29 | End: 2024-09-02

## 2024-08-29 RX ORDER — CEFDINIR 300 MG/1
300 CAPSULE ORAL ONCE
Status: COMPLETED | OUTPATIENT
Start: 2024-08-29 | End: 2024-08-29

## 2024-08-29 RX ADMIN — AZITHROMYCIN DIHYDRATE 500 MG: 250 TABLET ORAL at 00:54

## 2024-08-29 RX ADMIN — CEFDINIR 300 MG: 300 CAPSULE ORAL at 00:54

## 2024-08-29 NOTE — ED PROVIDER NOTES
ED Provider Note    CHIEF COMPLAINT  Chief Complaint   Patient presents with    Chest Pain    Cough    Leg Swelling     BIB from group home, reports mid upper chest pain started at 1900, described as shooting pain. Patient also reports non productive cough x2weeks, sometimes SOB upon coughing. Patient noticed bilateral leg swelling started this week.  mg 2000, 0.4 Nitro 2005, fentanyl 50 mcg given by EMS enroute       EXTERNAL RECORDS REVIEWED  Inpatient Notes discharge summary from 8/20/2024 noted history of atrial fibrillation on Eliquis, bipolar 2 disorder, COPD, chronic respiratory failure on 3 L oxygen, hypertension, obesity, schizophrenia patient admitted for vertigo, MRI without acute abnormality    HPI/ROS  LIMITATION TO HISTORY     OUTSIDE HISTORIAN(S):      Lorene Haro is a 48 y.o. female who presents with chest pain and cough.  Patient says she has been having ongoing cough for the past 2 weeks with occasional shortness of breath.  Patient says she developed chest pain earlier tonight.  Patient says she has had similar chest pain off and on for weeks to months.  Patient says she has been taking her prescribed medications.    PAST MEDICAL HISTORY   has a past medical history of A-fib ([facility]), MONI (acute kidney injury) ([facility]) (8/9/2023), Asthma, Bipolar 2 disorder (HCC), Chickenpox, Chronic obstructive pulmonary disease (HCC), Hypertension, On home oxygen therapy, Other psychological stress, Schizophrenic disorder ([facility]), and Yeast infection of the skin (04/01/2021).    SURGICAL HISTORY   has a past surgical history that includes hysterectomy laparoscopy; colectomy; cholecystectomy; and knee arthroscopy (Left).    FAMILY HISTORY  Family History   Problem Relation Age of Onset    No Known Problems Mother     Cancer Sister        SOCIAL HISTORY  Social History     Tobacco Use    Smoking status: Former     Current packs/day: 0.00     Types: Cigarettes     Quit date: 10/12/2022     Years since  "quittin.8    Smokeless tobacco: Never   Vaping Use    Vaping status: Never Used   Substance and Sexual Activity    Alcohol use: Not Currently    Drug use: Never    Sexual activity: Not Currently       CURRENT MEDICATIONS  Home Medications       Reviewed by Amanda Ly R.N. (Registered Nurse) on 24 at 2047  Med List Status: Partial     Medication Last Dose Status   ADVAIR DISKUS 100-50 MCG/ACT AEROSOL POWDER, BREATH ACTIVATED  Active   apixaban (ELIQUIS) 5mg Tab  Active   aripiprazole (ABILIFY) 30 MG tablet  Active   atorvastatin (LIPITOR) 20 MG Tab  Active   fluticasone (FLONASE) 50 MCG/ACT nasal spray  Active   gabapentin (NEURONTIN) 300 MG Cap  Active   Home Care Oxygen  Active   lidocaine (LIDODERM) 5 % Patch  Active   meclizine (ANTIVERT) 25 MG Tab  Active   metoprolol SR (TOPROL XL) 25 MG TABLET SR 24 HR  Active   montelukast (SINGULAIR) 10 MG Tab  Active   nicotine (NICODERM) 14 MG/24HR PATCH 24 HR  Active   omeprazole (PRILOSEC) 20 MG delayed-release capsule  Active   ondansetron (ZOFRAN ODT) 4 MG TABLET DISPERSIBLE  Active                  Audit from Redirected Encounters    **Home medications have not yet been reviewed for this encounter**         ALLERGIES  Allergies   Allergen Reactions    Penicillin G Rash and Itching     pt reports that she gets a rash all over her body and gets itchy    Amoxicillin Rash and Itching     Tolerates cephalosporins, pt reports that she gets a rash all over her body and gets itchy       PHYSICAL EXAM  VITAL SIGNS: /54   Pulse 65   Temp 36.6 °C (97.9 °F) (Temporal)   Resp 20   Ht 1.549 m (5' 1\")   Wt (!) 126 kg (278 lb)   LMP  (LMP Unknown)   SpO2 98%   BMI 52.53 kg/m²    Constitutional: Alert in no apparent distress.  HENT: No signs of trauma, Bilateral external ears normal, Nose normal.   Eyes: Pupils are equal and reactive, Conjunctiva normal, Non-icteric.   Neck: Normal range of motion, No tenderness, Supple, No stridor. "   Cardiovascular: Regular rate and rhythm, no murmurs.   Thorax & Lungs: Bilateral basilar crackles, No respiratory distress, No wheezing, No chest tenderness.   Abdomen: Bowel sounds normal, Soft, No tenderness, No masses, No pulsatile masses.   Skin: Warm, Dry, No erythema, No rash.   Back: No bony tenderness, No CVA tenderness.   Extremities: Intact distal pulses, No edema, No tenderness, No cyanosis  Musculoskeletal: Good range of motion in all major joints. No tenderness to palpation or major deformities noted.   Neurologic: Alert , Normal motor function, Normal sensory function, No focal deficits noted.       EKG/LABS  Labs Reviewed   CBC WITH DIFFERENTIAL - Abnormal; Notable for the following components:       Result Value    WBC 11.8 (*)     Hemoglobin 11.8 (*)     MCHC 31.1 (*)     RDW 51.3 (*)     Neutrophils-Polys 75.40 (*)     Lymphocytes 16.70 (*)     Neutrophils (Absolute) 8.90 (*)     All other components within normal limits   COMP METABOLIC PANEL - Abnormal; Notable for the following components:    Glucose 106 (*)     Alkaline Phosphatase 155 (*)     All other components within normal limits   TROPONIN - Abnormal; Notable for the following components:    Troponin T 31 (*)     All other components within normal limits   TROPONIN - Abnormal; Notable for the following components:    Troponin T 30 (*)     All other components within normal limits   HCG QUAL SERUM   ESTIMATED GFR     Results for orders placed or performed during the hospital encounter of 24   EKG   Result Value Ref Range    Report       [facility] Emergency Dept.    Test Date:  2024  Pt Name:    ADELINA MILLAN                Department: ER  MRN:        [id number]                      Room:        16  Gender:     Female                       Technician: [id number]  :        [id number]                   Requested By:ER TRIAGE PROTOCOL  Order #:    [id number]                    Shar MD: Manjit Quesada  Collin    Measurements  Intervals                                Axis  Rate:       71                           P:          0  ND:         0                            QRS:        48  QRSD:       113                          T:          3  QT:         380  QTc:        413    Interpretive Statements    Interpreted by me: Atrial fibrillation, rate 71, no signs of acute ischemia  when compared to prior  Electronically Signed On 08- 21:04:23 PDT by Manjit Polanco       *Note: Due to a large number of results and/or encounters for the requested time period, some results have not been displayed. A complete set of results can be found in Results Review.         I have independently interpreted this EKG    RADIOLOGY/PROCEDURES   I have independently interpreted the diagnostic imaging associated with this visit and am waiting the final reading from the radiologist.   My preliminary interpretation is as follows: No pneumothorax    Radiologist interpretation:  DX-CHEST-PORTABLE (1 VIEW)   Final Result         1.  Left basilar atelectasis and/or subtle infiltrate.   2.  Cardiomegaly   3.  Atherosclerosis          COURSE & MEDICAL DECISION MAKING    ASSESSMENT, COURSE AND PLAN  Care Narrative: On arrival patient with stable oxygenation on home O2 levels.  Patient has had numerous evaluations for atypical chest pain in the past year, had stress test done on 8/10/2024 does not show any signs of ischemia.  ECG without dynamic changes.  Patient has chronically elevated troponins will obtain cardiac biomarkers to assess for significant change.  Will obtain x-ray to assess for pneumothorax, pulmonary edema or pneumonia.  Patient has chronic dizziness had recent extensive workup including negative MRI on 8/19/2024.  Patient has normal neurologic exam do not suspect CVA, dissection or bleed.    Chest x-ray with possible left-sided pneumonia patient does have mild leukocytosis stable elevation of troponin unremarkable  metabolic panel.  With patient's worsening productive cough we will treat with cefdinir and azithromycin for possible pneumonia.  PE would be less likely given patient has been compliant with her anticoagulation has no new oxygen requirements.  Patient was able to tolerate p.o. without difficulty.              ADDITIONAL PROBLEMS MANAGED      DISPOSITION AND DISCUSSIONS  Decision tools and prescription drugs considered including, but not limited to: Antibiotics for possible pneumonia .    FINAL DIAGNOSIS  1. Pneumonia of left lung due to infectious organism, unspecified part of lung         Electronically signed by: Manjit Polanco D.O., 8/28/2024 9:04 PM

## 2024-08-29 NOTE — DISCHARGE INSTRUCTIONS
We have given you prescription for antibiotics which you should use as directed.  Please take your other prescribed medications.

## 2024-08-29 NOTE — ED TRIAGE NOTES
"Chief Complaint   Patient presents with    Chest Pain    Cough    Leg Swelling     BIB from group home, reports mid upper chest pain started at 1900, described as shooting pain. Patient also reports non productive cough x2weeks, sometimes SOB upon coughing. Patient noticed bilateral leg swelling started this week.  mg 2000, 0.4 Nitro 2005, fentanyl 50 mcg given by EMS enroute     BP (!) 151/63   Pulse 70   Temp 36.6 °C (97.9 °F) (Temporal)   Resp 18   Ht 1.549 m (5' 1\")   Wt (!) 126 kg (278 lb)   LMP  (LMP Unknown)   SpO2 94%   BMI 52.53 kg/m²     Pain: 10/10    Pt is alert and oriented x 4, speaking in full sentences, follows commands and responds appropriately to questions.     Respirations are even and unlabored.  "

## 2024-08-29 NOTE — ED NOTES
Vital signs taken and recorded. IV removed. Discharge in stable condition via wheelchair. Health teachings given to patient with full understanding of the information given. No personal belongings left.    Patient able to walk with walker.    Patient complaining still dizzy and weak but refused to talk to the doctor and wants to go home.     Patient provided with crackers and ginger ale.    Patient provided with [facility] contact number.    Patient verbalized she was able to go home via MTM in the past visit here in ER.

## 2024-08-29 NOTE — ED NOTES
Patient complains of dizziness and nausea, as per patient meclizine did not help her.    ERP informed

## 2024-09-02 ENCOUNTER — NON-PROVIDER VISIT (OUTPATIENT)
Dept: CARDIOLOGY | Facility: MEDICAL CENTER | Age: 49
End: 2024-09-02
Payer: MEDICAID

## 2024-09-02 PROCEDURE — 93298 REM INTERROG DEV EVAL SCRMS: CPT | Mod: 26 | Performed by: INTERNAL MEDICINE

## 2024-09-03 NOTE — CARDIAC REMOTE MONITOR - SCAN
Device transmission reviewed. Device demonstrated appropriate function.       Electronically Signed by: Tyrell Solomon M.D.    9/5/2024  12:09 PM

## 2024-09-10 ENCOUNTER — APPOINTMENT (OUTPATIENT)
Dept: RADIOLOGY | Facility: MEDICAL CENTER | Age: 49
End: 2024-09-10
Attending: EMERGENCY MEDICINE
Payer: MEDICAID

## 2024-09-10 ENCOUNTER — HOSPITAL ENCOUNTER (EMERGENCY)
Facility: MEDICAL CENTER | Age: 49
End: 2024-09-10
Attending: EMERGENCY MEDICINE
Payer: MEDICAID

## 2024-09-10 VITALS
OXYGEN SATURATION: 95 % | WEIGHT: 278 LBS | HEIGHT: 61 IN | SYSTOLIC BLOOD PRESSURE: 108 MMHG | DIASTOLIC BLOOD PRESSURE: 55 MMHG | BODY MASS INDEX: 52.49 KG/M2 | RESPIRATION RATE: 16 BRPM | HEART RATE: 71 BPM | TEMPERATURE: 96.8 F

## 2024-09-10 DIAGNOSIS — M25.511 ACUTE PAIN OF RIGHT SHOULDER: ICD-10-CM

## 2024-09-10 LAB
ALBUMIN SERPL BCP-MCNC: 3.8 G/DL (ref 3.2–4.9)
ALBUMIN/GLOB SERPL: 1.2 G/DL
ALP SERPL-CCNC: 138 U/L (ref 30–99)
ALT SERPL-CCNC: 8 U/L (ref 2–50)
ANION GAP SERPL CALC-SCNC: 12 MMOL/L (ref 7–16)
AST SERPL-CCNC: 12 U/L (ref 12–45)
BASOPHILS # BLD AUTO: 0.2 % (ref 0–1.8)
BASOPHILS # BLD: 0.02 K/UL (ref 0–0.12)
BILIRUB SERPL-MCNC: 0.5 MG/DL (ref 0.1–1.5)
BUN SERPL-MCNC: 14 MG/DL (ref 8–22)
CALCIUM ALBUM COR SERPL-MCNC: 9.3 MG/DL (ref 8.5–10.5)
CALCIUM SERPL-MCNC: 9.1 MG/DL (ref 8.5–10.5)
CHLORIDE SERPL-SCNC: 109 MMOL/L (ref 96–112)
CO2 SERPL-SCNC: 24 MMOL/L (ref 20–33)
CREAT SERPL-MCNC: 0.94 MG/DL (ref 0.5–1.4)
EKG IMPRESSION: NORMAL
EOSINOPHIL # BLD AUTO: 0.11 K/UL (ref 0–0.51)
EOSINOPHIL NFR BLD: 1.1 % (ref 0–6.9)
ERYTHROCYTE [DISTWIDTH] IN BLOOD BY AUTOMATED COUNT: 51.3 FL (ref 35.9–50)
GFR SERPLBLD CREATININE-BSD FMLA CKD-EPI: 73 ML/MIN/1.73 M 2
GLOBULIN SER CALC-MCNC: 3.1 G/DL (ref 1.9–3.5)
GLUCOSE SERPL-MCNC: 142 MG/DL (ref 65–99)
HCG SERPL QL: NEGATIVE
HCT VFR BLD AUTO: 40.7 % (ref 37–47)
HGB BLD-MCNC: 12.2 G/DL (ref 12–16)
IMM GRANULOCYTES # BLD AUTO: 0.03 K/UL (ref 0–0.11)
IMM GRANULOCYTES NFR BLD AUTO: 0.3 % (ref 0–0.9)
LYMPHOCYTES # BLD AUTO: 1.49 K/UL (ref 1–4.8)
LYMPHOCYTES NFR BLD: 15 % (ref 22–41)
MCH RBC QN AUTO: 26.5 PG (ref 27–33)
MCHC RBC AUTO-ENTMCNC: 30 G/DL (ref 32.2–35.5)
MCV RBC AUTO: 88.3 FL (ref 81.4–97.8)
MONOCYTES # BLD AUTO: 0.39 K/UL (ref 0–0.85)
MONOCYTES NFR BLD AUTO: 3.9 % (ref 0–13.4)
NEUTROPHILS # BLD AUTO: 7.88 K/UL (ref 1.82–7.42)
NEUTROPHILS NFR BLD: 79.5 % (ref 44–72)
NRBC # BLD AUTO: 0 K/UL
NRBC BLD-RTO: 0 /100 WBC (ref 0–0.2)
PLATELET # BLD AUTO: 223 K/UL (ref 164–446)
PMV BLD AUTO: 11.2 FL (ref 9–12.9)
POTASSIUM SERPL-SCNC: 3.8 MMOL/L (ref 3.6–5.5)
PROT SERPL-MCNC: 6.9 G/DL (ref 6–8.2)
RBC # BLD AUTO: 4.61 M/UL (ref 4.2–5.4)
SODIUM SERPL-SCNC: 145 MMOL/L (ref 135–145)
TROPONIN T SERPL-MCNC: 38 NG/L (ref 6–19)
WBC # BLD AUTO: 9.9 K/UL (ref 4.8–10.8)

## 2024-09-10 PROCEDURE — 85025 COMPLETE CBC W/AUTO DIFF WBC: CPT

## 2024-09-10 PROCEDURE — 99285 EMERGENCY DEPT VISIT HI MDM: CPT

## 2024-09-10 PROCEDURE — 96375 TX/PRO/DX INJ NEW DRUG ADDON: CPT

## 2024-09-10 PROCEDURE — 36415 COLL VENOUS BLD VENIPUNCTURE: CPT

## 2024-09-10 PROCEDURE — 96374 THER/PROPH/DIAG INJ IV PUSH: CPT

## 2024-09-10 PROCEDURE — 73030 X-RAY EXAM OF SHOULDER: CPT | Mod: RT

## 2024-09-10 PROCEDURE — 93005 ELECTROCARDIOGRAM TRACING: CPT | Performed by: EMERGENCY MEDICINE

## 2024-09-10 PROCEDURE — 700111 HCHG RX REV CODE 636 W/ 250 OVERRIDE (IP): Mod: JZ,UD | Performed by: EMERGENCY MEDICINE

## 2024-09-10 PROCEDURE — 80053 COMPREHEN METABOLIC PANEL: CPT

## 2024-09-10 PROCEDURE — 71045 X-RAY EXAM CHEST 1 VIEW: CPT

## 2024-09-10 PROCEDURE — 84703 CHORIONIC GONADOTROPIN ASSAY: CPT

## 2024-09-10 PROCEDURE — 93005 ELECTROCARDIOGRAM TRACING: CPT

## 2024-09-10 PROCEDURE — 84484 ASSAY OF TROPONIN QUANT: CPT

## 2024-09-10 RX ORDER — ONDANSETRON 2 MG/ML
4 INJECTION INTRAMUSCULAR; INTRAVENOUS ONCE
Status: COMPLETED | OUTPATIENT
Start: 2024-09-10 | End: 2024-09-10

## 2024-09-10 RX ORDER — MORPHINE SULFATE 4 MG/ML
4 INJECTION INTRAVENOUS ONCE
Status: COMPLETED | OUTPATIENT
Start: 2024-09-10 | End: 2024-09-10

## 2024-09-10 RX ORDER — NAPROXEN 500 MG/1
500 TABLET ORAL 2 TIMES DAILY WITH MEALS
Qty: 60 TABLET | Refills: 0 | Status: SHIPPED | OUTPATIENT
Start: 2024-09-10

## 2024-09-10 RX ADMIN — MORPHINE SULFATE 4 MG: 4 INJECTION, SOLUTION INTRAMUSCULAR; INTRAVENOUS at 16:11

## 2024-09-10 RX ADMIN — ONDANSETRON 4 MG: 2 INJECTION INTRAMUSCULAR; INTRAVENOUS at 16:11

## 2024-09-10 ASSESSMENT — FIBROSIS 4 INDEX: FIB4 SCORE: 0.8

## 2024-09-10 ASSESSMENT — LIFESTYLE VARIABLES: DO YOU DRINK ALCOHOL: NO

## 2024-09-10 NOTE — ED PROVIDER NOTES
CHIEF COMPLAINT  Chief Complaint   Patient presents with    Cough    Shortness of Breath     Pt BIB EMS from home. Per report pt has been SOB/cough since yesterday and also having right sided CP. Pt given 1X albuterol PTA with little improvement, hx of COPD, pt wears 2L O2 at night.        LIMITATION TO HISTORY   Select: none    HPI    Lorene Haro is a 49 y.o. female who presents to the Emergency Department for shortness of breath onset yesterday. Patient reports that she also has constant chest pain that began yesterday as well. She describes her chest pain as pressure and notes that it is a 10/10 in severity when she moves or breathes. She states that her chest pain radiates to her back. The patient's pain is exacerbated with breathing and adds that she cannot move her arm due to the pain. Denies having this pain before and notes that it is different from her previous pain that she has been seen for. Denies any worsening of pain with eating. Denies any falls or trauma. Patient also reports that she has been coughing with some yellow phlegm production. Denies coughing up any blood. Denies any fever, chills, diarrhea, or constipation. The patient also reports that she felt nauseous last night and is feeling kind of dizzy here. Denies any vision changes. Patient denies being on any oxygen at night. Patient has a history of a-fib and notes that she takes Eliquis BID. Denies any other heart problems, including heart failure or MI history. Denies any history of DVT. Patient notes that she has an inhaler and also takes Abilify in the morning. Denies taking any blood pressure or pain medication. Denies smoking, drug use or alcohol use. Patient also has a history of hypertension and COPD. She has an allergy to Amoxicillin. Patient's PCP is Dr. Reddy. Denies any kidney or stomach problems.     OUTSIDE HISTORIAN(S):  Select: None    EXTERNAL RECORDS REVIEWED  Select: Patient was recently seen 8/28/2024 diagnosed  with pneumonia also has a history of schizophrenia.  At the time the patient was seen she had been having a cough for 2 weeks and also developed chest pain at the same time.    PAST MEDICAL HISTORY  Past Medical History:   Diagnosis Date    A-fib (HCC)     MONI (acute kidney injury) (HCC) 2023    Asthma     Bipolar 2 disorder (HCC)     Chickenpox     Chronic obstructive pulmonary disease (HCC)     Hypertension     On home oxygen therapy     Other psychological stress     Schizophrenic disorder (HCC)     Yeast infection of the skin 2021     SURGICAL HISTORY  Past Surgical History:   Procedure Laterality Date    CHOLECYSTECTOMY      COLECTOMY      HYSTERECTOMY LAPAROSCOPY      KNEE ARTHROSCOPY Left      FAMILY HISTORY  Family History   Problem Relation Age of Onset    No Known Problems Mother     Cancer Sister       SOCIAL HISTORY  Social History     Tobacco Use    Smoking status: Former     Current packs/day: 0.00     Types: Cigarettes     Quit date: 10/12/2022     Years since quittin.9    Smokeless tobacco: Never   Vaping Use    Vaping status: Never Used   Substance Use Topics    Alcohol use: Not Currently    Drug use: Never     CURRENT MEDICATIONS  Current Outpatient Medications Prior to Encounter   Medication Sig Dispense Refill    ondansetron (ZOFRAN ODT) 4 MG TABLET DISPERSIBLE Dissolve 1 Tablet by mouth every 6 hours as needed for Nausea/Vomiting. 10 Tablet 0    meclizine (ANTIVERT) 25 MG Tab Take 1 Tablet by mouth 3 times a day as needed for Vertigo or Dizziness. 30 Tablet 0    lidocaine (LIDODERM) 5 % Patch Place 1 Patch on the skin every 12 hours.      omeprazole (PRILOSEC) 20 MG delayed-release capsule Take 1 Capsule by mouth 2 times a day. 60 Capsule 0    nicotine (NICODERM) 14 MG/24HR PATCH 24 HR Place 1 Patch on the skin every 24 hours.      gabapentin (NEURONTIN) 300 MG Cap Take 300 mg by mouth 3 times a day.      Home Care Oxygen Inhale 2 L/min every evening.      ADVAIR DISKUS 100-50  "MCG/ACT AEROSOL POWDER, BREATH ACTIVATED Inhale 1 Puff 2 times a day. 180 Each 3    montelukast (SINGULAIR) 10 MG Tab Take 1 Tablet by mouth every evening. 90 Tablet 3    metoprolol SR (TOPROL XL) 25 MG TABLET SR 24 HR Take 25 mg by mouth every day.      apixaban (ELIQUIS) 5mg Tab Take 1 Tablet by mouth 2 times a day. 200 Tablet 3    fluticasone (FLONASE) 50 MCG/ACT nasal spray Administer 2 Sprays into each nostril every morning.      aripiprazole (ABILIFY) 30 MG tablet Take 1 Tablet by mouth every morning. Indications: Major Depressive Disorder 30 Tablet 0    atorvastatin (LIPITOR) 20 MG Tab Take 1 Tablet by mouth at bedtime. 30 Tablet 0     ALLERGIES  Allergies   Allergen Reactions    Penicillin G Rash and Itching     pt reports that she gets a rash all over her body and gets itchy    Amoxicillin Rash and Itching     Tolerates cephalosporins, pt reports that she gets a rash all over her body and gets itchy     PHYSICAL EXAM  VITAL SIGNS:BP (!) 141/60   Pulse 68   Temp 36.5 °C (97.7 °F) (Temporal)   Resp 16   Ht 1.549 m (5' 1\")   Wt (!) 126 kg (278 lb)   LMP  (LMP Unknown)   SpO2 96%   BMI 52.53 kg/m²       Constitutional: Well-developed no acute distress   HENT: Normocephalic, Atraumatic, Bilateral external ears normal.  Eyes:  conjunctiva are normal.   Neck: Supple.  Non tender midline  Cardiovascular: Regular rate and rhythm without murmurs gallops or rubs.   Thorax & Lungs: No respiratory distress. Breathing comfortably. Lungs are clear to auscultation bilaterally, there are no wheezes no rales. Chest wall is non tender.  Abdomen: Soft, non distended, non tender   Skin: Warm, Dry, No erythema,   Back: No tenderness, No CVA tenderness.  Musculoskeletal: Right shoulder tenderness, Limited range of motion secondary to pain, no effusion, no erythema. 1+ pitting edema lower extremities. No clubbing or cyanosis, good range of motion   Neurologic: Alert & oriented x 3, normal sensation moving all extremities " appears normal   Psychiatric: Affect normal, Judgment normal, Mood normal.     DIAGNOSTIC STUDIES / PROCEDURES    LABS  Results for orders placed or performed during the hospital encounter of 09/10/24   CBC with Differential   Result Value Ref Range    WBC 9.9 4.8 - 10.8 K/uL    RBC 4.61 4.20 - 5.40 M/uL    Hemoglobin 12.2 12.0 - 16.0 g/dL    Hematocrit 40.7 37.0 - 47.0 %    MCV 88.3 81.4 - 97.8 fL    MCH 26.5 (L) 27.0 - 33.0 pg    MCHC 30.0 (L) 32.2 - 35.5 g/dL    RDW 51.3 (H) 35.9 - 50.0 fL    Platelet Count 223 164 - 446 K/uL    MPV 11.2 9.0 - 12.9 fL    Neutrophils-Polys 79.50 (H) 44.00 - 72.00 %    Lymphocytes 15.00 (L) 22.00 - 41.00 %    Monocytes 3.90 0.00 - 13.40 %    Eosinophils 1.10 0.00 - 6.90 %    Basophils 0.20 0.00 - 1.80 %    Immature Granulocytes 0.30 0.00 - 0.90 %    Nucleated RBC 0.00 0.00 - 0.20 /100 WBC    Neutrophils (Absolute) 7.88 (H) 1.82 - 7.42 K/uL    Lymphs (Absolute) 1.49 1.00 - 4.80 K/uL    Monos (Absolute) 0.39 0.00 - 0.85 K/uL    Eos (Absolute) 0.11 0.00 - 0.51 K/uL    Baso (Absolute) 0.02 0.00 - 0.12 K/uL    Immature Granulocytes (abs) 0.03 0.00 - 0.11 K/uL    NRBC (Absolute) 0.00 K/uL   Comp Metabolic Panel   Result Value Ref Range    Sodium 145 135 - 145 mmol/L    Potassium 3.8 3.6 - 5.5 mmol/L    Chloride 109 96 - 112 mmol/L    Co2 24 20 - 33 mmol/L    Anion Gap 12.0 7.0 - 16.0    Glucose 142 (H) 65 - 99 mg/dL    Bun 14 8 - 22 mg/dL    Creatinine 0.94 0.50 - 1.40 mg/dL    Calcium 9.1 8.5 - 10.5 mg/dL    Correct Calcium 9.3 8.5 - 10.5 mg/dL    AST(SGOT) 12 12 - 45 U/L    ALT(SGPT) 8 2 - 50 U/L    Alkaline Phosphatase 138 (H) 30 - 99 U/L    Total Bilirubin 0.5 0.1 - 1.5 mg/dL    Albumin 3.8 3.2 - 4.9 g/dL    Total Protein 6.9 6.0 - 8.2 g/dL    Globulin 3.1 1.9 - 3.5 g/dL    A-G Ratio 1.2 g/dL   Troponins NOW   Result Value Ref Range    Troponin T 38 (H) 6 - 19 ng/L   HCG Qual Serum   Result Value Ref Range    Beta-Hcg Qualitative Serum Negative Negative   ESTIMATED GFR   Result Value  Ref Range    GFR (CKD-EPI) 73 >60 mL/min/1.73 m 2   EKG (NOW)   Result Value Ref Range    Report       [facility] Emergency Dept.    Test Date:  2024-09-10  Pt Name:    ADELINA MILLAN                Department: ER  MRN:        [id number]                      Room:  Gender:     Female                       Technician: [id number]  :        [id number]                   Requested By:ER TRIAGE PROTOCOL  Order #:    [id number]                    Reading MD: ROBIN GARCIA MD    Measurements  Intervals                                Axis  Rate:       69                           P:          81  NJ:         138                          QRS:        41  QRSD:       146                          T:          -5  QT:         412  QTc:        442    Interpretive Statements  Sinus rhythm  Atrial premature complex  IVCD, consider atypical RBBB  Compared to ECG 2024 20:38:12  Atrial premature complex(es) now present  Atrial fibrillation no longer present  no acute findings  Electronically Signed On 09- 16:58:39 PDT by ROBIN GARCIA MD       *Note: Due to a large number of results and/or encounters for the requested time period, some results have not been displayed. A complete set of results can be found in Results Review.     EKG:   I have independently interpreted this EKG as detailed above.    RADIOLOGY  I have independently interpreted the diagnostic imaging associated with this visit and am waiting the final reading from the radiologist.   My preliminary interpretation is as follows: No acute fracture on x-ray of the shoulder.  No significant changes to the chest x-ray.    Radiologist interpretation:   DX-SHOULDER 2+ RIGHT   Final Result      No acute osseous abnormality.      DX-CHEST-PORTABLE (1 VIEW)   Final Result      1. No acute cardiac or pulmonary abnormalities are identified.   2. The cardiac silhouette size remains enlarged.   3. Stable postsurgical changes.           COURSE & MEDICAL  DECISION MAKING    ED COURSE:    ED Observation Status? No, The patient does not qualify for observation status     INTERVENTIONS BY ME:  Medications   morphine 4 MG/ML injection 4 mg (4 mg Intravenous Given 9/10/24 1611)   ondansetron (Zofran) syringe/vial injection 4 mg (4 mg Intravenous Given 9/10/24 1611)       2:02 PM - Patient seen and examined at bedside. Discussed plan of care, including ordering labs, imaging, and EKG to further evaluate. Patient agrees to the plan of care. Ordered for DX-chest, CBC with diff, CMP, Troponins NOW, HCG qual, EKG to evaluate her symptoms.     3:35 PM - Patient was reevaluated at bedside. Patient reports having pain. The patient will be medicated with Zofran injection 4 mg and morphine injection 4 mg. Ordered DX-shoulder 2+ right to further evaluate.     4:32 PM - Patient was reevaluated at bedside. Patient reports feeling improved. Discussed lab and radiology results with the patient and informed them that they are reassuring. I discussed plan for discharge and follow up with her PCP. The patient is stable for discharge at this time and will return for any new or worsening symptoms. Patient verbalizes understanding and support with my plan for discharge.      INITIAL ASSESSMENT, COURSE AND PLAN  Care Narrative: Patient presents emerged part for evaluation.  Upon evaluation the patient is exquisitely tender about the right shoulder.  Her discussion of the continued chest discomfort was initially concerning and EKG laboratory studies were drawn.  The patient's troponin slightly elevated at 38 but it is consistently elevated from 38-41.  H&H and platelet counts are normal.  The patient's white blood cell count is normal.  CMP has a slightly elevated glucose of 142.  Chest x-ray as well as shoulder x-ray were normal.  Clinically the patient is tender exquisitely about the shoulder itself.  I do feel that this is more of a musculoskeletal injury and discomfort.  She did recently  have pneumonia and was treated.  At this point I do not feel she has any recurrence of pneumonia or that this is worsening pleurisy.  I do not feel that this is cardiac in nature for her chest pain I do feel that it is musculoskeletal I will start the patient on anti-inflammatories recommend a follow-up with her primary care provider for recheck in 1 week and possible orthopedics for evaluation of the shoulder.  At this point it seems to be more of a tendinitis or shoulder arthropathy at this point I do not feel the patient will require admission to hospital or any further emergent care.  The patient is to follow-up as above.          ADDITIONAL PROBLEM LIST  Psychiatric    DISPOSITION AND DISCUSSIONS  I have discussed management of the patient with the following physicians/ JAY's/ ancillary staff:  None    Escalation of care considered, and ultimately not performed: None    Barriers to care at this time, including but not limited to:  None .     Decision tools and prescription drugs considered including, but not limited to: As above.    The patient will return for new or worsening symptoms and is stable at the time of discharge.    It was noticed that the patient's blood pressure was greater than 120/80 on today's visit. At this point, most likely related to reactive hypertension, secondary to the emergency visit itself. I have recommend the patient followup with her primary care physician for recheck of her blood pressure.        DISPOSITION:  Patient will be discharged home in stable condition.    FOLLOW UP:  [facility] [address]  [place] [address]  [phone]        OUTPATIENT MEDICATIONS:  Discharge Medication List as of 9/10/2024  5:31 PM        START taking these medications    Details   naproxen (NAPROSYN) 500 MG Tab Take 1 Tablet by mouth 2 times a day with meals., Disp-60 Tablet, R-0, Normal            FINAL DIAGNOSIS  1. Acute pain of right shoulder       I, Alisa  Nubia Hartley), am scribing for, and in the presence of, Aron Delacruz M.D..    Electronically signed by: Alisa Delacruz (Ade), 9/10/2024    IAron M.D. personally performed the services described in this documentation, as scribed by Alisa Delacruz in my presence, and it is both accurate and complete.     Electronically signed by: Aron Delacruz M.D.,8:05 PM 09/10/24

## 2024-09-10 NOTE — ED TRIAGE NOTES
Chief Complaint   Patient presents with    Cough    Shortness of Breath     Pt BIB EMS from home. Per report pt has been SOB/cough since yesterday and also having right sided CP. Pt given 1X albuterol PTA with little improvement, hx of COPD, pt wears 2L O2 at night.      Explained to pt triage process, made pt aware to tell this RN/staff of any changes/concerns, pt verbalized understanding of process and instructions given. Pt to ER lobby.

## 2024-09-10 NOTE — ED NOTES
Pt to Red 5, agree with triage note.  Pt on RA 95%.  Pt changed into gown and placed on monitors.  IV access obtained.  Blood drawn and sent to lab. ERP to see.

## 2024-09-11 NOTE — ED NOTES
Sling place to right arm/shoulder.  Discharge instructions given.  All questions answered.  Prescriptions given x1.  Pt to follow-up with orthopedics, return to ER if symptoms worsen as discussed.  Pt verbalized understanding.  All belongings with pt.  Pt escorted to lobby.

## 2024-09-13 ENCOUNTER — HOSPITAL ENCOUNTER (OUTPATIENT)
Dept: RADIOLOGY | Facility: MEDICAL CENTER | Age: 49
End: 2024-09-13
Attending: ORTHOPAEDIC SURGERY
Payer: MEDICAID

## 2024-09-15 ENCOUNTER — HOSPITAL ENCOUNTER (EMERGENCY)
Facility: MEDICAL CENTER | Age: 49
End: 2024-09-15
Attending: EMERGENCY MEDICINE
Payer: MEDICAID

## 2024-09-15 ENCOUNTER — APPOINTMENT (OUTPATIENT)
Dept: RADIOLOGY | Facility: MEDICAL CENTER | Age: 49
End: 2024-09-15
Attending: EMERGENCY MEDICINE
Payer: MEDICAID

## 2024-09-15 VITALS
SYSTOLIC BLOOD PRESSURE: 123 MMHG | DIASTOLIC BLOOD PRESSURE: 60 MMHG | WEIGHT: 278 LBS | RESPIRATION RATE: 16 BRPM | TEMPERATURE: 97.9 F | BODY MASS INDEX: 52.49 KG/M2 | HEART RATE: 57 BPM | OXYGEN SATURATION: 96 % | HEIGHT: 61 IN

## 2024-09-15 DIAGNOSIS — G89.29 CHRONIC RIGHT SHOULDER PAIN: ICD-10-CM

## 2024-09-15 DIAGNOSIS — M25.511 CHRONIC RIGHT SHOULDER PAIN: ICD-10-CM

## 2024-09-15 PROCEDURE — 99284 EMERGENCY DEPT VISIT MOD MDM: CPT | Mod: 25

## 2024-09-15 PROCEDURE — 73200 CT UPPER EXTREMITY W/O DYE: CPT | Mod: RT

## 2024-09-15 PROCEDURE — 72125 CT NECK SPINE W/O DYE: CPT

## 2024-09-15 ASSESSMENT — FIBROSIS 4 INDEX: FIB4 SCORE: 0.97

## 2024-09-15 NOTE — ED TRIAGE NOTES
Chief Complaint   Patient presents with    Shoulder Pain     Right shoulder pain. Seen at [facility] 9/13, told she needs a CT, CT scheduled at [facility] next week but reports increased pain.      Patient received 600mg Motrin and 1000mg Tylenol by EMS PTA.

## 2024-09-15 NOTE — DISCHARGE INSTRUCTIONS
Follow-up with orthopedics.  Your scans look reassuring.  Continue to wear your sling.  Return for worsening pain, or any other concerns or complaints.

## 2024-09-15 NOTE — ED NOTES
Pt wheeled to the room via WC. Changed into a gown and placed on Cardiac, SpO2 and BP monitors. Call light within reach. No further complaints at this time. Chart up for ERP.

## 2024-09-15 NOTE — ED PROVIDER NOTES
ER Provider Note    Scribed for Juan Gibson M.d. by Carlos Oconnor. 9/15/2024  2:03 PM    Primary Care Provider: Lili Reddy M.D.    CHIEF COMPLAINT   Chief Complaint   Patient presents with    Shoulder Pain     Right shoulder pain. Seen at [facility] 9/13, told she needs a CT, CT scheduled at [facility] next week but reports increased pain.      EXTERNAL RECORDS REVIEWED  Outpatient Notes The patient was seen on 9/13/24 at [facility] main for right shoulder pain with X-ray imaging concerning for comminuted minimally displaced fracture.      HPI/ROS  LIMITATION TO HISTORY   Select: : None  OUTSIDE HISTORIAN(S):  None    Lorene Haro is a 49 y.o. female who presents to the ED for evaluation of right shoulder pain onset last Wednesday. The patient denies any trauma. The patient reports associated symptoms of right sided neck pain, but denies head pain, chest pain, or shortness of breath. The patient was seen at [facility] on 9/13 where she states a CT had been scheduled, but that her pain has worsened since Friday. She denies a history of diabetes. She denies any medical history, but takes Abilify.    PAST MEDICAL HISTORY  Past Medical History:   Diagnosis Date    A-fib (HCC)     MONI (acute kidney injury) (HCC) 8/9/2023    Asthma     Bipolar 2 disorder (HCC)     Chickenpox     Chronic obstructive pulmonary disease (HCC)     Hypertension     On home oxygen therapy     Other psychological stress     Schizophrenic disorder (HCC)     Yeast infection of the skin 04/01/2021     SURGICAL HISTORY  Past Surgical History:   Procedure Laterality Date    CHOLECYSTECTOMY      COLECTOMY      HYSTERECTOMY LAPAROSCOPY      KNEE ARTHROSCOPY Left      FAMILY HISTORY  Family History   Problem Relation Age of Onset    No Known Problems Mother     Cancer Sister      SOCIAL HISTORY   reports that she quit smoking about 23 months ago. Her smoking use included cigarettes. She has never used smokeless tobacco. She reports that she does not currently use  "alcohol. She reports that she does not use drugs.    CURRENT MEDICATIONS  Previous Medications    ADVAIR DISKUS 100-50 MCG/ACT AEROSOL POWDER, BREATH ACTIVATED    Inhale 1 Puff 2 times a day.    APIXABAN (ELIQUIS) 5MG TAB    Take 1 Tablet by mouth 2 times a day.    ARIPIPRAZOLE (ABILIFY) 30 MG TABLET    Take 1 Tablet by mouth every morning. Indications: Major Depressive Disorder    ATORVASTATIN (LIPITOR) 20 MG TAB    Take 1 Tablet by mouth at bedtime.    FLUTICASONE (FLONASE) 50 MCG/ACT NASAL SPRAY    Administer 2 Sprays into each nostril every morning.    GABAPENTIN (NEURONTIN) 300 MG CAP    Take 300 mg by mouth 3 times a day.    HOME CARE OXYGEN    Inhale 2 L/min every evening.    HYDROXYZINE HCL (ATARAX) 25 MG TAB        LIDOCAINE (LIDODERM) 5 % PATCH    Place 1 Patch on the skin every 12 hours.    LIDOCAINE (LMX) 4 % CREAM    1 application as needed Externally Three times a day for 30 days    MECLIZINE (ANTIVERT) 25 MG TAB    Take 1 Tablet by mouth 3 times a day as needed for Vertigo or Dizziness.    METHYLPREDNISOLONE (MEDROL) 4 MG TAB        METOPROLOL SR (TOPROL XL) 25 MG TABLET SR 24 HR    Take 25 mg by mouth every day.    MONTELUKAST (SINGULAIR) 10 MG TAB    Take 1 Tablet by mouth every evening.    NAPROXEN (NAPROSYN) 500 MG TAB    Take 1 Tablet by mouth 2 times a day with meals.    NICOTINE (NICODERM) 14 MG/24HR PATCH 24 HR    Place 1 Patch on the skin every 24 hours.    NICOTINE (NICODERM) 7 MG/24HR PATCH 24 HR        OMEPRAZOLE (PRILOSEC) 20 MG DELAYED-RELEASE CAPSULE    Take 1 Capsule by mouth 2 times a day.    ONDANSETRON (ZOFRAN ODT) 4 MG TABLET DISPERSIBLE    Dissolve 1 Tablet by mouth every 6 hours as needed for Nausea/Vomiting.    THIAMINE HCL (VITAMIN B1) 100 MG TAB         ALLERGIES  Penicillin g and Amoxicillin    PHYSICAL EXAM  /60   Pulse 64   Temp 36.3 °C (97.4 °F) (Temporal)   Resp 16   Ht 1.549 m (5' 1\")   Wt (!) 126 kg (278 lb)   LMP  (LMP Unknown)   SpO2 95%   BMI 52.53 " kg/m²   Constitutional: Well developed, Well nourished, No acute distress, Non-toxic appearance.   HENT: Normocephalic, Atraumatic, Bilateral external ears normal, Oropharynx moist, No oral exudates, Nose normal.   Eyes: PERRL, EOMI, Conjunctiva normal, No discharge.   Neck:There is no midline tenderness.  There is some paraspinal muscle spasm on the right right-sided trapezius muscle spasm that reproduces her pain  Cardiovascular: Normal heart rate, Normal rhythm, No murmurs, No rubs, No gallops.   Thorax & Lungs: Normal breath sounds, No respiratory distress, No wheezing,   Musculoskeletal: Good range of motion in all major joints.  She has tenderness over the anterior part of the glenohumeral joint on the right but she has pain-free range of motion.  I do not think this is likely to be a intra-articular process.  She has normal neurovascular and good pulses.  There is no redness or warmth.  Neurologic: Alert, No focal deficits noted.   Psychiatric: Affect normal    DIAGNOSTIC STUDIES    RADIOLOGY/PROCEDURES   The attending emergency physician has independently interpreted the diagnostic imaging associated with this visit and am waiting the final reading from the radiologist.   My preliminary interpretation is a follows: I have reviewed the CT of the cervical spine and agree with radiologist results.    Radiologist interpretation:  CT-SHOULDER W/O PLUS RECONS RIGHT   Final Result         1. No acute fracture or dislocation      CT-CSPINE WITHOUT PLUS RECONS   Final Result      1.  Mild to moderate cervical spondylosis, most pronounced at C5-C7 levels.   2.  No acute fracture or traumatic listhesis in the cervical spine.        COURSE & MEDICAL DECISION MAKING     ASSESSMENT, COURSE AND PLAN  Care Narrative:   2:05 PM - Patient seen and examined at bedside. Discussed plan of care, including CT imaging. Patient agrees to the plan of care. Ordered for Ct shoulder without plus recons right, CT Cspine without plus  recons to evaluate her symptoms.      Differential diagnosis includes an occult fracture of the shoulder is concerned as expressed by orthopedics have also considered strain, muscle spasm, referred pain or other processes in the thorax like cardiac etiology or other.    The patient was seen and examined her pain seems to be very focal and musculoskeletal in nature I suspect is reasonable to image her shoulder.  This could be shoulder could also be cervical radiculopathy so have added CTs of the cervical spine shows some degeneration and CT of the shoulder which does not show a fracture.    The patient has multiple visits for chest pain and multiple laboratory and other radiographic workups I do not think this is likely to be cardiac or abdominal in nature I do not think is referred.  Is very focal when you palpate shoulder and the trapezius muscle.  Then we can treated conservatively with rest and return precautions.  Questions were answered.  Is agreeable to plan.    3:39 PM - Reviewed patient imaging at this time. I anticipate discharge.     Patient is agreeable to plan she is to follow-up with orthopedics and her doctor questions were answered.      DISPOSITION AND DISCUSSIONS  I have discussed management of the patient with the following physicians and JAY's:  None    Discussion of management with other QHP or appropriate source(s): None       Barriers to care at this time, including but not limited to:  None known at this time .       The patient will return for new or worsening symptoms and is stable at the time of discharge.    DISPOSITION:  Patient will be discharged home in stable condition.    FOLLOW UP:  Lili Reddy M.D.  [address]  [address]  [facility] [address]  [phone]    Schedule an appointment as soon as possible for a visit in 2 days    OUTPATIENT MEDICATIONS:  New Prescriptions    No medications on file      FINAL DIAGNOSIS  1. Chronic right shoulder pain       Carlos HARRIS  (Scribe), am scribing for, and in the presence of, Juan Gibson M.D..    Electronically signed by: Carlos Oconnor (Scribe), 9/15/2024    IJuan M.D. personally performed the services described in this documentation, as scribed by Carlos Oconnor in my presence, and it is both accurate and complete.      The note accurately reflects work and decisions made by me.  Juan Gibson M.D.  9/15/2024  5:38 PM

## 2024-09-15 NOTE — ED NOTES
Sling place. Education provided, pt verbalized understanding. Pt discharged home with written and verbal instructions regarding f/u, activity, reasons to return to ED. Verbalized understanding, all questions addressed, ambulated out with a steady gait with all belongings including personal walker

## 2024-09-18 ENCOUNTER — APPOINTMENT (OUTPATIENT)
Dept: RADIOLOGY | Facility: MEDICAL CENTER | Age: 49
End: 2024-09-18
Attending: ORTHOPAEDIC SURGERY
Payer: MEDICAID

## 2024-09-20 ENCOUNTER — APPOINTMENT (OUTPATIENT)
Dept: RADIOLOGY | Facility: MEDICAL CENTER | Age: 49
DRG: 190 | End: 2024-09-20
Attending: EMERGENCY MEDICINE
Payer: MEDICAID

## 2024-09-20 ENCOUNTER — HOSPITAL ENCOUNTER (INPATIENT)
Facility: MEDICAL CENTER | Age: 49
LOS: 2 days | DRG: 190 | End: 2024-09-22
Attending: EMERGENCY MEDICINE | Admitting: HOSPITALIST
Payer: MEDICAID

## 2024-09-20 DIAGNOSIS — J96.01 ACUTE RESPIRATORY FAILURE WITH HYPOXIA (HCC): ICD-10-CM

## 2024-09-20 PROBLEM — J44.1 COPD EXACERBATION (HCC): Status: ACTIVE | Noted: 2024-09-20

## 2024-09-20 PROBLEM — I50.23 ACUTE ON CHRONIC SYSTOLIC HEART FAILURE (HCC): Status: ACTIVE | Noted: 2024-09-20

## 2024-09-20 LAB
ALBUMIN SERPL BCP-MCNC: 4 G/DL (ref 3.2–4.9)
ALBUMIN/GLOB SERPL: 1.5 G/DL
ALP SERPL-CCNC: 162 U/L (ref 30–99)
ALT SERPL-CCNC: 14 U/L (ref 2–50)
ANION GAP SERPL CALC-SCNC: 12 MMOL/L (ref 7–16)
AST SERPL-CCNC: 13 U/L (ref 12–45)
BASOPHILS # BLD AUTO: 0.3 % (ref 0–1.8)
BASOPHILS # BLD: 0.02 K/UL (ref 0–0.12)
BILIRUB SERPL-MCNC: 0.6 MG/DL (ref 0.1–1.5)
BUN SERPL-MCNC: 13 MG/DL (ref 8–22)
CALCIUM ALBUM COR SERPL-MCNC: 9.6 MG/DL (ref 8.5–10.5)
CALCIUM SERPL-MCNC: 9.6 MG/DL (ref 8.5–10.5)
CHLORIDE SERPL-SCNC: 106 MMOL/L (ref 96–112)
CO2 SERPL-SCNC: 24 MMOL/L (ref 20–33)
CREAT SERPL-MCNC: 0.83 MG/DL (ref 0.5–1.4)
EKG IMPRESSION: NORMAL
EOSINOPHIL # BLD AUTO: 0.13 K/UL (ref 0–0.51)
EOSINOPHIL NFR BLD: 1.7 % (ref 0–6.9)
ERYTHROCYTE [DISTWIDTH] IN BLOOD BY AUTOMATED COUNT: 50.4 FL (ref 35.9–50)
GFR SERPLBLD CREATININE-BSD FMLA CKD-EPI: 85 ML/MIN/1.73 M 2
GLOBULIN SER CALC-MCNC: 2.7 G/DL (ref 1.9–3.5)
GLUCOSE SERPL-MCNC: 75 MG/DL (ref 65–99)
HCT VFR BLD AUTO: 39.2 % (ref 37–47)
HGB BLD-MCNC: 11.9 G/DL (ref 12–16)
IMM GRANULOCYTES # BLD AUTO: 0.02 K/UL (ref 0–0.11)
IMM GRANULOCYTES NFR BLD AUTO: 0.3 % (ref 0–0.9)
LACTATE SERPL-SCNC: 0.9 MMOL/L (ref 0.5–2)
LYMPHOCYTES # BLD AUTO: 1.54 K/UL (ref 1–4.8)
LYMPHOCYTES NFR BLD: 20.1 % (ref 22–41)
MCH RBC QN AUTO: 26.8 PG (ref 27–33)
MCHC RBC AUTO-ENTMCNC: 30.4 G/DL (ref 32.2–35.5)
MCV RBC AUTO: 88.3 FL (ref 81.4–97.8)
MONOCYTES # BLD AUTO: 0.44 K/UL (ref 0–0.85)
MONOCYTES NFR BLD AUTO: 5.7 % (ref 0–13.4)
NEUTROPHILS # BLD AUTO: 5.53 K/UL (ref 1.82–7.42)
NEUTROPHILS NFR BLD: 71.9 % (ref 44–72)
NRBC # BLD AUTO: 0 K/UL
NRBC BLD-RTO: 0 /100 WBC (ref 0–0.2)
NT-PROBNP SERPL IA-MCNC: 416 PG/ML (ref 0–125)
PLATELET # BLD AUTO: 206 K/UL (ref 164–446)
PMV BLD AUTO: 12.2 FL (ref 9–12.9)
POTASSIUM SERPL-SCNC: 4.6 MMOL/L (ref 3.6–5.5)
PROT SERPL-MCNC: 6.7 G/DL (ref 6–8.2)
RBC # BLD AUTO: 4.44 M/UL (ref 4.2–5.4)
SODIUM SERPL-SCNC: 142 MMOL/L (ref 135–145)
TROPONIN T SERPL-MCNC: 36 NG/L (ref 6–19)
WBC # BLD AUTO: 7.7 K/UL (ref 4.8–10.8)

## 2024-09-20 PROCEDURE — 85025 COMPLETE CBC W/AUTO DIFF WBC: CPT

## 2024-09-20 PROCEDURE — 93005 ELECTROCARDIOGRAM TRACING: CPT | Performed by: EMERGENCY MEDICINE

## 2024-09-20 PROCEDURE — 94640 AIRWAY INHALATION TREATMENT: CPT

## 2024-09-20 PROCEDURE — 700111 HCHG RX REV CODE 636 W/ 250 OVERRIDE (IP): Performed by: EMERGENCY MEDICINE

## 2024-09-20 PROCEDURE — 96375 TX/PRO/DX INJ NEW DRUG ADDON: CPT

## 2024-09-20 PROCEDURE — 84484 ASSAY OF TROPONIN QUANT: CPT

## 2024-09-20 PROCEDURE — 83880 ASSAY OF NATRIURETIC PEPTIDE: CPT

## 2024-09-20 PROCEDURE — 96365 THER/PROPH/DIAG IV INF INIT: CPT

## 2024-09-20 PROCEDURE — 700102 HCHG RX REV CODE 250 W/ 637 OVERRIDE(OP): Performed by: HOSPITALIST

## 2024-09-20 PROCEDURE — 700117 HCHG RX CONTRAST REV CODE 255: Mod: UD | Performed by: EMERGENCY MEDICINE

## 2024-09-20 PROCEDURE — 770020 HCHG ROOM/CARE - TELE (206)

## 2024-09-20 PROCEDURE — 71045 X-RAY EXAM CHEST 1 VIEW: CPT

## 2024-09-20 PROCEDURE — 71275 CT ANGIOGRAPHY CHEST: CPT

## 2024-09-20 PROCEDURE — 94660 CPAP INITIATION&MGMT: CPT

## 2024-09-20 PROCEDURE — 700101 HCHG RX REV CODE 250: Performed by: HOSPITALIST

## 2024-09-20 PROCEDURE — 93005 ELECTROCARDIOGRAM TRACING: CPT

## 2024-09-20 PROCEDURE — 80053 COMPREHEN METABOLIC PANEL: CPT

## 2024-09-20 PROCEDURE — 99223 1ST HOSP IP/OBS HIGH 75: CPT | Performed by: HOSPITALIST

## 2024-09-20 PROCEDURE — 99285 EMERGENCY DEPT VISIT HI MDM: CPT

## 2024-09-20 PROCEDURE — A9270 NON-COVERED ITEM OR SERVICE: HCPCS | Performed by: HOSPITALIST

## 2024-09-20 PROCEDURE — 36415 COLL VENOUS BLD VENIPUNCTURE: CPT

## 2024-09-20 PROCEDURE — 700101 HCHG RX REV CODE 250: Mod: UD | Performed by: EMERGENCY MEDICINE

## 2024-09-20 PROCEDURE — 83605 ASSAY OF LACTIC ACID: CPT

## 2024-09-20 RX ORDER — PREDNISONE 20 MG/1
40 TABLET ORAL ONCE
Status: COMPLETED | OUTPATIENT
Start: 2024-09-20 | End: 2024-09-20

## 2024-09-20 RX ORDER — ATORVASTATIN CALCIUM 20 MG/1
20 TABLET, FILM COATED ORAL
Status: DISCONTINUED | OUTPATIENT
Start: 2024-09-20 | End: 2024-09-22 | Stop reason: HOSPADM

## 2024-09-20 RX ORDER — ONDANSETRON 2 MG/ML
4 INJECTION INTRAMUSCULAR; INTRAVENOUS EVERY 4 HOURS PRN
Status: DISCONTINUED | OUTPATIENT
Start: 2024-09-20 | End: 2024-09-22 | Stop reason: HOSPADM

## 2024-09-20 RX ORDER — ONDANSETRON 4 MG/1
4 TABLET, ORALLY DISINTEGRATING ORAL EVERY 4 HOURS PRN
Status: DISCONTINUED | OUTPATIENT
Start: 2024-09-20 | End: 2024-09-22 | Stop reason: HOSPADM

## 2024-09-20 RX ORDER — ONDANSETRON 2 MG/ML
4 INJECTION INTRAMUSCULAR; INTRAVENOUS ONCE
Status: COMPLETED | OUTPATIENT
Start: 2024-09-20 | End: 2024-09-20

## 2024-09-20 RX ORDER — GUAIFENESIN/DEXTROMETHORPHAN 100-10MG/5
10 SYRUP ORAL EVERY 6 HOURS PRN
Status: DISCONTINUED | OUTPATIENT
Start: 2024-09-20 | End: 2024-09-22 | Stop reason: HOSPADM

## 2024-09-20 RX ORDER — PROMETHAZINE HYDROCHLORIDE 25 MG/1
12.5-25 TABLET ORAL EVERY 4 HOURS PRN
Status: DISCONTINUED | OUTPATIENT
Start: 2024-09-20 | End: 2024-09-22 | Stop reason: HOSPADM

## 2024-09-20 RX ORDER — AZITHROMYCIN 500 MG/1
500 INJECTION, POWDER, LYOPHILIZED, FOR SOLUTION INTRAVENOUS ONCE
Status: COMPLETED | OUTPATIENT
Start: 2024-09-20 | End: 2024-09-20

## 2024-09-20 RX ORDER — FUROSEMIDE 10 MG/ML
40 INJECTION INTRAMUSCULAR; INTRAVENOUS
Status: DISCONTINUED | OUTPATIENT
Start: 2024-09-21 | End: 2024-09-21

## 2024-09-20 RX ORDER — ACETAMINOPHEN 325 MG/1
650 TABLET ORAL EVERY 6 HOURS PRN
Status: DISCONTINUED | OUTPATIENT
Start: 2024-09-20 | End: 2024-09-22 | Stop reason: HOSPADM

## 2024-09-20 RX ORDER — HYDRALAZINE HYDROCHLORIDE 20 MG/ML
10 INJECTION INTRAMUSCULAR; INTRAVENOUS EVERY 4 HOURS PRN
Status: DISCONTINUED | OUTPATIENT
Start: 2024-09-20 | End: 2024-09-22 | Stop reason: HOSPADM

## 2024-09-20 RX ORDER — PROMETHAZINE HYDROCHLORIDE 25 MG/1
12.5-25 SUPPOSITORY RECTAL EVERY 4 HOURS PRN
Status: DISCONTINUED | OUTPATIENT
Start: 2024-09-20 | End: 2024-09-22 | Stop reason: HOSPADM

## 2024-09-20 RX ORDER — GABAPENTIN 300 MG/1
300 CAPSULE ORAL 3 TIMES DAILY
Status: DISCONTINUED | OUTPATIENT
Start: 2024-09-20 | End: 2024-09-22 | Stop reason: HOSPADM

## 2024-09-20 RX ORDER — METOPROLOL SUCCINATE 25 MG/1
25 TABLET, EXTENDED RELEASE ORAL DAILY
Status: DISCONTINUED | OUTPATIENT
Start: 2024-09-21 | End: 2024-09-22 | Stop reason: HOSPADM

## 2024-09-20 RX ORDER — PREDNISONE 20 MG/1
40 TABLET ORAL DAILY
Status: DISCONTINUED | OUTPATIENT
Start: 2024-09-21 | End: 2024-09-22 | Stop reason: HOSPADM

## 2024-09-20 RX ORDER — AZITHROMYCIN 500 MG/5ML
500 INJECTION, POWDER, LYOPHILIZED, FOR SOLUTION INTRAVENOUS ONCE
Status: DISCONTINUED | OUTPATIENT
Start: 2024-09-20 | End: 2024-09-20

## 2024-09-20 RX ORDER — PROCHLORPERAZINE EDISYLATE 5 MG/ML
5-10 INJECTION INTRAMUSCULAR; INTRAVENOUS EVERY 4 HOURS PRN
Status: DISCONTINUED | OUTPATIENT
Start: 2024-09-20 | End: 2024-09-22 | Stop reason: HOSPADM

## 2024-09-20 RX ORDER — IPRATROPIUM BROMIDE AND ALBUTEROL SULFATE 2.5; .5 MG/3ML; MG/3ML
3 SOLUTION RESPIRATORY (INHALATION)
Status: DISCONTINUED | OUTPATIENT
Start: 2024-09-20 | End: 2024-09-22

## 2024-09-20 RX ORDER — IPRATROPIUM BROMIDE AND ALBUTEROL SULFATE 2.5; .5 MG/3ML; MG/3ML
3 SOLUTION RESPIRATORY (INHALATION)
Status: DISCONTINUED | OUTPATIENT
Start: 2024-09-20 | End: 2024-09-22 | Stop reason: HOSPADM

## 2024-09-20 RX ORDER — MONTELUKAST SODIUM 10 MG/1
10 TABLET ORAL EVERY EVENING
Status: DISCONTINUED | OUTPATIENT
Start: 2024-09-20 | End: 2024-09-22 | Stop reason: HOSPADM

## 2024-09-20 RX ORDER — ARIPIPRAZOLE 15 MG/1
30 TABLET ORAL EVERY MORNING
Status: DISCONTINUED | OUTPATIENT
Start: 2024-09-21 | End: 2024-09-22 | Stop reason: HOSPADM

## 2024-09-20 RX ORDER — IPRATROPIUM BROMIDE AND ALBUTEROL SULFATE 2.5; .5 MG/3ML; MG/3ML
3 SOLUTION RESPIRATORY (INHALATION)
Status: COMPLETED | OUTPATIENT
Start: 2024-09-20 | End: 2024-09-20

## 2024-09-20 RX ADMIN — IPRATROPIUM BROMIDE AND ALBUTEROL SULFATE 3 ML: 2.5; .5 SOLUTION RESPIRATORY (INHALATION) at 22:00

## 2024-09-20 RX ADMIN — OMEPRAZOLE 20 MG: 20 CAPSULE, DELAYED RELEASE ORAL at 23:35

## 2024-09-20 RX ADMIN — IPRATROPIUM BROMIDE AND ALBUTEROL SULFATE 3 ML: 2.5; .5 SOLUTION RESPIRATORY (INHALATION) at 15:05

## 2024-09-20 RX ADMIN — MONTELUKAST 10 MG: 10 TABLET, FILM COATED ORAL at 23:35

## 2024-09-20 RX ADMIN — ATORVASTATIN CALCIUM 20 MG: 20 TABLET, FILM COATED ORAL at 23:35

## 2024-09-20 RX ADMIN — AZITHROMYCIN 500 MG: 500 INJECTION, POWDER, LYOPHILIZED, FOR SOLUTION INTRAVENOUS at 19:14

## 2024-09-20 RX ADMIN — PREDNISONE 40 MG: 20 TABLET ORAL at 19:12

## 2024-09-20 RX ADMIN — ONDANSETRON 4 MG: 2 INJECTION INTRAMUSCULAR; INTRAVENOUS at 18:05

## 2024-09-20 RX ADMIN — GABAPENTIN 300 MG: 300 CAPSULE ORAL at 23:35

## 2024-09-20 RX ADMIN — IOHEXOL 90 ML: 350 INJECTION, SOLUTION INTRAVENOUS at 18:00

## 2024-09-20 RX ADMIN — APIXABAN 5 MG: 5 TABLET, FILM COATED ORAL at 23:35

## 2024-09-20 ASSESSMENT — ENCOUNTER SYMPTOMS
COUGH: 1
POLYDIPSIA: 0
VOMITING: 0
CONSTIPATION: 0
WHEEZING: 1
SPUTUM PRODUCTION: 0
TINGLING: 0
DIARRHEA: 0
BRUISES/BLEEDS EASILY: 0
NAUSEA: 0
MYALGIAS: 0
EYE PAIN: 0
TREMORS: 0
NECK PAIN: 0
DEPRESSION: 0
WEAKNESS: 0
FLANK PAIN: 0
HEADACHES: 0
DOUBLE VISION: 0
SINUS PAIN: 0
BLURRED VISION: 0
ORTHOPNEA: 0
SORE THROAT: 0
ABDOMINAL PAIN: 0
BACK PAIN: 0
PALPITATIONS: 0
PHOTOPHOBIA: 0
HEARTBURN: 0
CLAUDICATION: 0
STRIDOR: 0
DIAPHORESIS: 0
HALLUCINATIONS: 0
FALLS: 0
HEMOPTYSIS: 0
CHILLS: 0
FEVER: 0
SHORTNESS OF BREATH: 1
BLOOD IN STOOL: 0
DIZZINESS: 0
PND: 0

## 2024-09-20 ASSESSMENT — PATIENT HEALTH QUESTIONNAIRE - PHQ9
SUM OF ALL RESPONSES TO PHQ9 QUESTIONS 1 AND 2: 0
5. POOR APPETITE OR OVEREATING: NOT AT ALL
2. FEELING DOWN, DEPRESSED, IRRITABLE, OR HOPELESS: NOT AT ALL
8. MOVING OR SPEAKING SO SLOWLY THAT OTHER PEOPLE COULD HAVE NOTICED. OR THE OPPOSITE, BEING SO FIGETY OR RESTLESS THAT YOU HAVE BEEN MOVING AROUND A LOT MORE THAN USUAL: NOT AT ALL
6. FEELING BAD ABOUT YOURSELF - OR THAT YOU ARE A FAILURE OR HAVE LET YOURSELF OR YOUR FAMILY DOWN: NOT AL ALL
9. THOUGHTS THAT YOU WOULD BE BETTER OFF DEAD, OR OF HURTING YOURSELF: NOT AT ALL
3. TROUBLE FALLING OR STAYING ASLEEP OR SLEEPING TOO MUCH: NOT AT ALL
SUM OF ALL RESPONSES TO PHQ QUESTIONS 1-9: 0
4. FEELING TIRED OR HAVING LITTLE ENERGY: NOT AT ALL
7. TROUBLE CONCENTRATING ON THINGS, SUCH AS READING THE NEWSPAPER OR WATCHING TELEVISION: NOT AT ALL
1. LITTLE INTEREST OR PLEASURE IN DOING THINGS: NOT AT ALL

## 2024-09-20 ASSESSMENT — COGNITIVE AND FUNCTIONAL STATUS - GENERAL
MOVING TO AND FROM BED TO CHAIR: A LITTLE
PERSONAL GROOMING: A LITTLE
MOBILITY SCORE: 16
STANDING UP FROM CHAIR USING ARMS: A LITTLE
DRESSING REGULAR UPPER BODY CLOTHING: A LITTLE
DAILY ACTIVITIY SCORE: 17
TURNING FROM BACK TO SIDE WHILE IN FLAT BAD: A LITTLE
MOVING FROM LYING ON BACK TO SITTING ON SIDE OF FLAT BED: A LITTLE
DRESSING REGULAR LOWER BODY CLOTHING: A LOT
HELP NEEDED FOR BATHING: A LOT
SUGGESTED CMS G CODE MODIFIER DAILY ACTIVITY: CK
CLIMB 3 TO 5 STEPS WITH RAILING: A LOT
SUGGESTED CMS G CODE MODIFIER MOBILITY: CK
WALKING IN HOSPITAL ROOM: A LOT
TOILETING: A LITTLE

## 2024-09-20 ASSESSMENT — SOCIAL DETERMINANTS OF HEALTH (SDOH)
WITHIN THE LAST YEAR, HAVE YOU BEEN HUMILIATED OR EMOTIONALLY ABUSED IN OTHER WAYS BY YOUR PARTNER OR EX-PARTNER?: NO
IN THE PAST 12 MONTHS, HAS THE ELECTRIC, GAS, OIL, OR WATER COMPANY THREATENED TO SHUT OFF SERVICE IN YOUR HOME?: NO
WITHIN THE PAST 12 MONTHS, THE FOOD YOU BOUGHT JUST DIDN'T LAST AND YOU DIDN'T HAVE MONEY TO GET MORE: NEVER TRUE
WITHIN THE LAST YEAR, HAVE TO BEEN RAPED OR FORCED TO HAVE ANY KIND OF SEXUAL ACTIVITY BY YOUR PARTNER OR EX-PARTNER?: NO
WITHIN THE LAST YEAR, HAVE YOU BEEN AFRAID OF YOUR PARTNER OR EX-PARTNER?: NO
WITHIN THE LAST YEAR, HAVE YOU BEEN KICKED, HIT, SLAPPED, OR OTHERWISE PHYSICALLY HURT BY YOUR PARTNER OR EX-PARTNER?: NO
WITHIN THE PAST 12 MONTHS, YOU WORRIED THAT YOUR FOOD WOULD RUN OUT BEFORE YOU GOT THE MONEY TO BUY MORE: NEVER TRUE

## 2024-09-20 ASSESSMENT — LIFESTYLE VARIABLES
HAVE PEOPLE ANNOYED YOU BY CRITICIZING YOUR DRINKING: NO
ALCOHOL_USE: NO
TOTAL SCORE: 0
EVER FELT BAD OR GUILTY ABOUT YOUR DRINKING: NO
SUBSTANCE_ABUSE: 0
CONSUMPTION TOTAL: NEGATIVE
TOTAL SCORE: 0
HOW MANY TIMES IN THE PAST YEAR HAVE YOU HAD 5 OR MORE DRINKS IN A DAY: 0
HAVE YOU EVER FELT YOU SHOULD CUT DOWN ON YOUR DRINKING: NO
ON A TYPICAL DAY WHEN YOU DRINK ALCOHOL HOW MANY DRINKS DO YOU HAVE: 0
TOTAL SCORE: 0
AVERAGE NUMBER OF DAYS PER WEEK YOU HAVE A DRINK CONTAINING ALCOHOL: 0
EVER HAD A DRINK FIRST THING IN THE MORNING TO STEADY YOUR NERVES TO GET RID OF A HANGOVER: NO
DOES PATIENT WANT TO STOP DRINKING: NO

## 2024-09-20 ASSESSMENT — PAIN DESCRIPTION - PAIN TYPE
TYPE: ACUTE PAIN
TYPE: ACUTE PAIN

## 2024-09-20 ASSESSMENT — FIBROSIS 4 INDEX
FIB4 SCORE: 0.86
FIB4 SCORE: 0.97

## 2024-09-20 NOTE — ED TRIAGE NOTES
"Chief Complaint   Patient presents with    Shortness of Breath     Patient reports SOB and productive cough x 1 week. History of COPD       48 yo female to triage for above complaint. Patient from [facility].   EKG complete. Protocol ordered.    Pt is alert and oriented, speaking in full sentences, follows commands and responds appropriately to questions.     Patient placed back in lobby and educated on triage process. Asked to inform RN of any changes.    /72   Pulse (!) 56   Temp 36.7 °C (98 °F) (Temporal)   Resp 18   Ht 1.549 m (5' 1\")   Wt (!) 126 kg (277 lb 12.5 oz)   LMP  (LMP Unknown)   SpO2 99%   BMI 52.49 kg/m²     "

## 2024-09-20 NOTE — ED NOTES
Rounded on patient, resting with calm unlabored breathing. Updated on POC.  No needs at this time.

## 2024-09-20 NOTE — ED PROVIDER NOTES
ER Provider Note    Scribed for Gabriel Mohamud M.d. by Renetta Ko. 9/20/2024  2:20 PM    Primary Care Provider: Lili Reddy M.D.    CHIEF COMPLAINT   Chief Complaint   Patient presents with    Shortness of Breath     Patient reports SOB and productive cough x 1 week. History of COPD     HPI/ROS  LIMITATION TO HISTORY   Select: : None  OUTSIDE HISTORIAN(S):  None    Lorene Haro is a 49 y.o. female who presents to the ED complaining of shortness of breath onset today. Patient reports that she has associated productive cough and sharp chest pain that radiates to her back. This feels different than her previous chest pain. She has a history of atrial fibrillation and COPD. Patient wears 2 liters of oxygen at night and denies any oxygen use during the day. She no longer is smoking. Patient reports that she has been complaint with her nebulizer and inhalers. Denies any recent falls or surgeries. Patient has not had any vomiting or diarrhea. She wast on steroids last month after being diagnosed with walking pneumonia.     PAST MEDICAL HISTORY  Past Medical History:   Diagnosis Date    A-fib ([facility])     MONI (acute kidney injury) (HCC) 8/9/2023    Asthma     Bipolar 2 disorder (HCC)     Chickenpox     Chronic obstructive pulmonary disease (HCC)     Hypertension     On home oxygen therapy     Other psychological stress     Schizophrenic disorder (HCC)     Yeast infection of the skin 04/01/2021       SURGICAL HISTORY  Past Surgical History:   Procedure Laterality Date    CHOLECYSTECTOMY      COLECTOMY      HYSTERECTOMY LAPAROSCOPY      KNEE ARTHROSCOPY Left        FAMILY HISTORY  Family History   Problem Relation Age of Onset    No Known Problems Mother     Cancer Sister        SOCIAL HISTORY   reports that she quit smoking about 23 months ago. Her smoking use included cigarettes. She has never used smokeless tobacco. She reports that she does not currently use alcohol. She reports that she does not use  "drugs.    CURRENT MEDICATIONS  Previous Medications    ADVAIR DISKUS 100-50 MCG/ACT AEROSOL POWDER, BREATH ACTIVATED    Inhale 1 Puff 2 times a day.    APIXABAN (ELIQUIS) 5MG TAB    Take 1 Tablet by mouth 2 times a day.    ARIPIPRAZOLE (ABILIFY) 30 MG TABLET    Take 1 Tablet by mouth every morning. Indications: Major Depressive Disorder    ATORVASTATIN (LIPITOR) 20 MG TAB    Take 1 Tablet by mouth at bedtime.    FLUTICASONE (FLONASE) 50 MCG/ACT NASAL SPRAY    Administer 2 Sprays into each nostril every morning.    GABAPENTIN (NEURONTIN) 300 MG CAP    Take 300 mg by mouth 3 times a day.    HOME CARE OXYGEN    Inhale 2 L/min every evening.    HYDROXYZINE HCL (ATARAX) 25 MG TAB        LIDOCAINE (LIDODERM) 5 % PATCH    Place 1 Patch on the skin every 12 hours.    LIDOCAINE (LMX) 4 % CREAM    1 application as needed Externally Three times a day for 30 days    MECLIZINE (ANTIVERT) 25 MG TAB    Take 1 Tablet by mouth 3 times a day as needed for Vertigo or Dizziness.    METHYLPREDNISOLONE (MEDROL) 4 MG TAB        METOPROLOL SR (TOPROL XL) 25 MG TABLET SR 24 HR    Take 25 mg by mouth every day.    MONTELUKAST (SINGULAIR) 10 MG TAB    Take 1 Tablet by mouth every evening.    NAPROXEN (NAPROSYN) 500 MG TAB    Take 1 Tablet by mouth 2 times a day with meals.    NICOTINE (NICODERM) 14 MG/24HR PATCH 24 HR    Place 1 Patch on the skin every 24 hours.    NICOTINE (NICODERM) 7 MG/24HR PATCH 24 HR        OMEPRAZOLE (PRILOSEC) 20 MG DELAYED-RELEASE CAPSULE    Take 1 Capsule by mouth 2 times a day.    ONDANSETRON (ZOFRAN ODT) 4 MG TABLET DISPERSIBLE    Dissolve 1 Tablet by mouth every 6 hours as needed for Nausea/Vomiting.    THIAMINE HCL (VITAMIN B1) 100 MG TAB           ALLERGIES  Penicillin g and Amoxicillin    PHYSICAL EXAM  /72   Pulse (!) 56   Temp 36.7 °C (98 °F) (Temporal)   Resp 18   Ht 1.549 m (5' 1\")   Wt (!) 126 kg (277 lb 12.5 oz)   LMP  (LMP Unknown)   SpO2 99%   BMI 52.49 kg/m²   Constitutional: Alert in no " apparent distress.  HENT: No signs of trauma, Bilateral external ears normal, Nose normal. Uvula midline.   Eyes: Pupils are equal and reactive, Conjunctiva normal, Non-icteric.   Neck: Normal range of motion, No tenderness, Supple, No stridor.   Lymphatic: No lymphadenopathy noted.   Cardiovascular: Regular rate and rhythm, no murmurs.   Thorax & Lungs: Diminished breath sounds diffusely, No respiratory distress, No wheezing, No chest tenderness.   Abdomen: Bowel sounds normal, Soft, No tenderness, No peritoneal signs, No masses, No pulsatile masses.   Skin: Warm, Dry, No erythema, No rash.   Back: No bony tenderness, No CVA tenderness.   Extremities: Intact distal pulses, No edema, No tenderness, No cyanosis.  Musculoskeletal: Good range of motion in all major joints. No tenderness to palpation or major deformities noted.   Neurologic: Alert , Normal motor function, Normal sensory function, No focal deficits noted.   Psychiatric: Affect normal, Judgment normal, Mood normal.      DIAGNOSTIC STUDIES    EKG/LABS  Labs Reviewed   CBC WITH DIFFERENTIAL - Abnormal; Notable for the following components:       Result Value    Hemoglobin 11.9 (*)     MCH 26.8 (*)     MCHC 30.4 (*)     RDW 50.4 (*)     Lymphocytes 20.10 (*)     All other components within normal limits   COMP METABOLIC PANEL - Abnormal; Notable for the following components:    Alkaline Phosphatase 162 (*)     All other components within normal limits   PROBRAIN NATRIURETIC PEPTIDE, NT - Abnormal; Notable for the following components:    NT-proBNP 416 (*)     All other components within normal limits   TROPONIN - Abnormal; Notable for the following components:    Troponin T 36 (*)     All other components within normal limits   LACTIC ACID   ESTIMATED GFR   PROCALCITONIN   POCT COV-2, FLU A/B, RSV BY PCR      I have independently interpreted this EKG    RADIOLOGY/PROCEDURES   The attending emergency physician has independently interpreted the diagnostic  imaging associated with this visit and am waiting the final reading from the radiologist.   My preliminary interpretation is a follows: no ptx    Radiologist interpretation:  CT-CTA COMPLETE THORACOABDOMINAL AORTA   Final Result         1. Atherosclerosis without aneurysm or dissection.   2. Hepatosplenomegaly with possible cirrhosis.   3. Status post cholecystectomy.   4. Right thyroid nodule.                        DX-CHEST-PORTABLE (1 VIEW)   Final Result      No acute cardiac or pulmonary abnormalities are identified.          COURSE & MEDICAL DECISION MAKING     ASSESSMENT, COURSE AND PLAN  Care Narrative:     2:20 PM - Patient seen and examined at bedside. Discussed plan of care, including a diagnostic work up with labs and oxygen. Patient was placed on oxygen as her oxygen saturations were in the 80s. I informed her that she may need to be hospitalized for further evaluation. Patient agrees to the plan of care.     PTX:No e/o PTX on CXR and pt w/ b/l BS  No e/o pna on CXR and pt afebrile  No Chest pain or chest pressure to suggest ACS  DuoNeb given  Prednisone given  Azithromycin given  Given ongoing hypoxia and no baseline oxygen at home reported besides at nighttime per patient plan for admission    9:04 PM - I discussed the patient's case and the above findings with Dr. Esteban (Hospitalist) who agrees to evaluate the patient for hospitalization.         DISPOSITION AND DISCUSSIONS  I have discussed management of the patient with the following physicians and JAY's:  Dr. Esteban (Hospitalist)    Discussion of management with other [facility] or appropriate source(s): None     DISPOSITION:  Patient will be hospitalized by Dr. Esteban (Hospitalist) in guarded condition.     FINAL DIANGOSIS  1. Acute respiratory failure with hypoxia (HCC)       Renetta HARRIS (Ade), am scribing for, and in the presence of, Gabriel Mohamud M.D..    Electronically signed by: Renetta Ko (Ade), 9/20/2024    Gabriel HARRIS M.D.  personally performed the services described in this documentation, as scribed by Renetta Ko in my presence, and it is both accurate and complete.      The note accurately reflects work and decisions made by me.  Gabriel Mohamud M.D.  9/20/2024  10:20 PM

## 2024-09-21 PROBLEM — K74.60 CIRRHOSIS (HCC): Status: ACTIVE | Noted: 2024-09-21

## 2024-09-21 LAB
ALBUMIN SERPL BCP-MCNC: 3.9 G/DL (ref 3.2–4.9)
ALBUMIN/GLOB SERPL: 1.3 G/DL
ALP SERPL-CCNC: 169 U/L (ref 30–99)
ALT SERPL-CCNC: 13 U/L (ref 2–50)
AMMONIA PLAS-SCNC: 17 UMOL/L (ref 11–45)
ANION GAP SERPL CALC-SCNC: 10 MMOL/L (ref 7–16)
AST SERPL-CCNC: 14 U/L (ref 12–45)
BASOPHILS # BLD AUTO: 0.1 % (ref 0–1.8)
BASOPHILS # BLD: 0.01 K/UL (ref 0–0.12)
BILIRUB SERPL-MCNC: 0.8 MG/DL (ref 0.1–1.5)
BUN SERPL-MCNC: 14 MG/DL (ref 8–22)
CALCIUM ALBUM COR SERPL-MCNC: 9.7 MG/DL (ref 8.5–10.5)
CALCIUM SERPL-MCNC: 9.6 MG/DL (ref 8.5–10.5)
CHLORIDE SERPL-SCNC: 104 MMOL/L (ref 96–112)
CO2 SERPL-SCNC: 25 MMOL/L (ref 20–33)
CREAT SERPL-MCNC: 0.95 MG/DL (ref 0.5–1.4)
EOSINOPHIL # BLD AUTO: 0.01 K/UL (ref 0–0.51)
EOSINOPHIL NFR BLD: 0.1 % (ref 0–6.9)
ERYTHROCYTE [DISTWIDTH] IN BLOOD BY AUTOMATED COUNT: 51.2 FL (ref 35.9–50)
FLUAV RNA SPEC QL NAA+PROBE: NEGATIVE
FLUBV RNA SPEC QL NAA+PROBE: NEGATIVE
GFR SERPLBLD CREATININE-BSD FMLA CKD-EPI: 73 ML/MIN/1.73 M 2
GLOBULIN SER CALC-MCNC: 3 G/DL (ref 1.9–3.5)
GLUCOSE SERPL-MCNC: 170 MG/DL (ref 65–99)
HCT VFR BLD AUTO: 39 % (ref 37–47)
HGB BLD-MCNC: 12 G/DL (ref 12–16)
IMM GRANULOCYTES # BLD AUTO: 0.04 K/UL (ref 0–0.11)
IMM GRANULOCYTES NFR BLD AUTO: 0.5 % (ref 0–0.9)
INR PPP: 1.25 (ref 0.87–1.13)
LYMPHOCYTES # BLD AUTO: 0.49 K/UL (ref 1–4.8)
LYMPHOCYTES NFR BLD: 6.4 % (ref 22–41)
MCH RBC QN AUTO: 27.2 PG (ref 27–33)
MCHC RBC AUTO-ENTMCNC: 30.8 G/DL (ref 32.2–35.5)
MCV RBC AUTO: 88.4 FL (ref 81.4–97.8)
MONOCYTES # BLD AUTO: 0.05 K/UL (ref 0–0.85)
MONOCYTES NFR BLD AUTO: 0.7 % (ref 0–13.4)
NEUTROPHILS # BLD AUTO: 7.03 K/UL (ref 1.82–7.42)
NEUTROPHILS NFR BLD: 92.2 % (ref 44–72)
NRBC # BLD AUTO: 0 K/UL
NRBC BLD-RTO: 0 /100 WBC (ref 0–0.2)
PLATELET # BLD AUTO: 183 K/UL (ref 164–446)
PMV BLD AUTO: 12.2 FL (ref 9–12.9)
POTASSIUM SERPL-SCNC: 5 MMOL/L (ref 3.6–5.5)
PROCALCITONIN SERPL-MCNC: 0.05 NG/ML
PROT SERPL-MCNC: 6.9 G/DL (ref 6–8.2)
PROTHROMBIN TIME: 15.7 SEC (ref 12–14.6)
RBC # BLD AUTO: 4.41 M/UL (ref 4.2–5.4)
RSV RNA SPEC QL NAA+PROBE: NEGATIVE
SARS-COV-2 RNA RESP QL NAA+PROBE: NOTDETECTED
SODIUM SERPL-SCNC: 139 MMOL/L (ref 135–145)
SPECIMEN SOURCE: NORMAL
WBC # BLD AUTO: 7.6 K/UL (ref 4.8–10.8)

## 2024-09-21 PROCEDURE — 700111 HCHG RX REV CODE 636 W/ 250 OVERRIDE (IP): Performed by: HOSPITALIST

## 2024-09-21 PROCEDURE — 80053 COMPREHEN METABOLIC PANEL: CPT

## 2024-09-21 PROCEDURE — 700101 HCHG RX REV CODE 250: Performed by: HOSPITALIST

## 2024-09-21 PROCEDURE — 94669 MECHANICAL CHEST WALL OSCILL: CPT

## 2024-09-21 PROCEDURE — 82140 ASSAY OF AMMONIA: CPT

## 2024-09-21 PROCEDURE — 700102 HCHG RX REV CODE 250 W/ 637 OVERRIDE(OP): Performed by: HOSPITALIST

## 2024-09-21 PROCEDURE — 94664 DEMO&/EVAL PT USE INHALER: CPT

## 2024-09-21 PROCEDURE — 36415 COLL VENOUS BLD VENIPUNCTURE: CPT

## 2024-09-21 PROCEDURE — 94660 CPAP INITIATION&MGMT: CPT

## 2024-09-21 PROCEDURE — 94640 AIRWAY INHALATION TREATMENT: CPT

## 2024-09-21 PROCEDURE — 770020 HCHG ROOM/CARE - TELE (206)

## 2024-09-21 PROCEDURE — 99232 SBSQ HOSP IP/OBS MODERATE 35: CPT | Mod: GC | Performed by: INTERNAL MEDICINE

## 2024-09-21 PROCEDURE — 85610 PROTHROMBIN TIME: CPT

## 2024-09-21 PROCEDURE — 84145 PROCALCITONIN (PCT): CPT

## 2024-09-21 PROCEDURE — 85025 COMPLETE CBC W/AUTO DIFF WBC: CPT

## 2024-09-21 PROCEDURE — 0241U HCHG SARS-COV-2 COVID-19 NFCT DS RESP RNA 4 TRGT MIC: CPT

## 2024-09-21 PROCEDURE — A9270 NON-COVERED ITEM OR SERVICE: HCPCS | Performed by: HOSPITALIST

## 2024-09-21 RX ADMIN — FUROSEMIDE 40 MG: 10 INJECTION INTRAMUSCULAR; INTRAVENOUS at 05:37

## 2024-09-21 RX ADMIN — MOMETASONE FUROATE AND FORMOTEROL FUMARATE DIHYDRATE 2 PUFF: 100; 5 AEROSOL RESPIRATORY (INHALATION) at 08:04

## 2024-09-21 RX ADMIN — ATORVASTATIN CALCIUM 20 MG: 20 TABLET, FILM COATED ORAL at 21:38

## 2024-09-21 RX ADMIN — IPRATROPIUM BROMIDE AND ALBUTEROL SULFATE 3 ML: 2.5; .5 SOLUTION RESPIRATORY (INHALATION) at 18:58

## 2024-09-21 RX ADMIN — IPRATROPIUM BROMIDE AND ALBUTEROL SULFATE 3 ML: 2.5; .5 SOLUTION RESPIRATORY (INHALATION) at 11:21

## 2024-09-21 RX ADMIN — ARIPIPRAZOLE 30 MG: 15 TABLET ORAL at 06:27

## 2024-09-21 RX ADMIN — PREDNISONE 40 MG: 20 TABLET ORAL at 05:37

## 2024-09-21 RX ADMIN — OMEPRAZOLE 20 MG: 20 CAPSULE, DELAYED RELEASE ORAL at 21:38

## 2024-09-21 RX ADMIN — OMEPRAZOLE 20 MG: 20 CAPSULE, DELAYED RELEASE ORAL at 08:04

## 2024-09-21 RX ADMIN — APIXABAN 5 MG: 5 TABLET, FILM COATED ORAL at 17:27

## 2024-09-21 RX ADMIN — MONTELUKAST 10 MG: 10 TABLET, FILM COATED ORAL at 17:27

## 2024-09-21 RX ADMIN — GABAPENTIN 300 MG: 300 CAPSULE ORAL at 21:38

## 2024-09-21 RX ADMIN — IPRATROPIUM BROMIDE AND ALBUTEROL SULFATE 3 ML: 2.5; .5 SOLUTION RESPIRATORY (INHALATION) at 02:32

## 2024-09-21 RX ADMIN — IPRATROPIUM BROMIDE AND ALBUTEROL SULFATE 3 ML: 2.5; .5 SOLUTION RESPIRATORY (INHALATION) at 06:46

## 2024-09-21 RX ADMIN — MOMETASONE FUROATE AND FORMOTEROL FUMARATE DIHYDRATE 2 PUFF: 100; 5 AEROSOL RESPIRATORY (INHALATION) at 21:37

## 2024-09-21 RX ADMIN — GABAPENTIN 300 MG: 300 CAPSULE ORAL at 13:37

## 2024-09-21 RX ADMIN — APIXABAN 5 MG: 5 TABLET, FILM COATED ORAL at 05:37

## 2024-09-21 RX ADMIN — GABAPENTIN 300 MG: 300 CAPSULE ORAL at 05:37

## 2024-09-21 RX ADMIN — IPRATROPIUM BROMIDE AND ALBUTEROL SULFATE 3 ML: 2.5; .5 SOLUTION RESPIRATORY (INHALATION) at 15:28

## 2024-09-21 RX ADMIN — METOPROLOL SUCCINATE 25 MG: 25 TABLET, EXTENDED RELEASE ORAL at 05:37

## 2024-09-21 RX ADMIN — IPRATROPIUM BROMIDE AND ALBUTEROL SULFATE 3 ML: 2.5; .5 SOLUTION RESPIRATORY (INHALATION) at 23:19

## 2024-09-21 ASSESSMENT — COPD QUESTIONNAIRES
DO YOU EVER COUGH UP ANY MUCUS OR PHLEGM?: NO/ONLY WITH OCCASIONAL COLDS OR INFECTIONS
COPD SCREENING SCORE: 4
IN THE PAST 12 MONTHS DO YOU DO LESS THAN YOU USED TO BECAUSE OF YOUR BREATHING PROBLEMS: DISAGREE/UNSURE
DURING THE PAST 4 WEEKS HOW MUCH DID YOU FEEL SHORT OF BREATH: SOME OF THE TIME
HAVE YOU SMOKED AT LEAST 100 CIGARETTES IN YOUR ENTIRE LIFE: YES

## 2024-09-21 ASSESSMENT — FIBROSIS 4 INDEX: FIB4 SCORE: 1.08

## 2024-09-21 ASSESSMENT — PAIN DESCRIPTION - PAIN TYPE
TYPE: ACUTE PAIN

## 2024-09-21 NOTE — ED NOTES
Assumed care of patient, patient bedside report received from JC Correa. Pt AAO X 4 , respirations even and unlabored, on 2 liters O2 via nasal canula . Introduced self as pt RN, POC discussed, call light in reach, Oxygen safety measures in place and RN traced the line, Fall risk interventions in place.

## 2024-09-21 NOTE — ED NOTES
Called [facility] to request MAR be faxed over, No answer, Left voicemail.    Website states that facility is open mon-fri 9-5pm.

## 2024-09-21 NOTE — H&P
Hospital Medicine History & Physical Note    Date of Service  9/21/2024    Primary Care Physician  Lili Reddy M.D.    Consultants      Specialist Names:     Code Status  Full Code    Chief Complaint  Chief Complaint   Patient presents with    Shortness of Breath     Patient reports SOB and productive cough x 1 week. History of COPD       History of Presenting Illness  Lorene Haro is a 49 y.o. female who presented 9/20/2024 with past medical history of COPD, bipolar, hypertension, atrial fibrillation who presents to the hospital for shortness of breath and a productive cough for the past 1 week.  Patient also complains of a chest pain that starts of her chest and radiates to the left side.  Patient was having nausea and vomiting at home.  She denies any fevers, chills, diarrhea.  The patient was trying to take her home inhalers and nebulizer which was effective for controlling her symptoms.    Chest x-ray interpreted by me found increased intravascular congestion and mild pulm edema  EKG interpreted by me found sinus bradycardia    I discussed the plan of care with patient.    Review of Systems  Review of Systems   Constitutional:  Negative for chills, diaphoresis, fever and malaise/fatigue.   HENT:  Negative for congestion, ear discharge, ear pain, hearing loss, nosebleeds, sinus pain, sore throat and tinnitus.    Eyes:  Negative for blurred vision, double vision, photophobia and pain.   Respiratory:  Positive for cough, shortness of breath and wheezing. Negative for hemoptysis, sputum production and stridor.    Cardiovascular:  Positive for chest pain. Negative for palpitations, orthopnea, claudication, leg swelling and PND.   Gastrointestinal:  Negative for abdominal pain, blood in stool, constipation, diarrhea, heartburn, melena, nausea and vomiting.   Genitourinary:  Negative for dysuria, flank pain, frequency, hematuria and urgency.   Musculoskeletal:  Negative for back pain, falls, joint pain,  myalgias and neck pain.   Skin:  Negative for itching and rash.   Neurological:  Negative for dizziness, tingling, tremors, weakness and headaches.   Endo/Heme/Allergies:  Negative for environmental allergies and polydipsia. Does not bruise/bleed easily.   Psychiatric/Behavioral:  Negative for depression, hallucinations, substance abuse and suicidal ideas.        Past Medical History   has a past medical history of A-fib ([facility]), MONI (acute kidney injury) ([facility]) (8/9/2023), Asthma, Bipolar 2 disorder ([facility]), Chickenpox, Chronic obstructive pulmonary disease ([facility]), Hypertension, On home oxygen therapy, Other psychological stress, Schizophrenic disorder ([facility]), and Yeast infection of the skin (04/01/2021).    Surgical History   has a past surgical history that includes hysterectomy laparoscopy; colectomy; cholecystectomy; and knee arthroscopy (Left).     Family History  family history includes Cancer in her sister; No Known Problems in her mother.   Family history reviewed with patient. There is no family history that is pertinent to the chief complaint.     Social History   reports that she quit smoking about 1 years ago. Her smoking use included cigarettes. She has never used smokeless tobacco. She reports that she does not currently use alcohol. She reports that she does not use drugs.    Allergies  Allergies   Allergen Reactions    Penicillin G Rash and Itching     pt reports that she gets a rash all over her body and gets itchy    Amoxicillin Rash and Itching     Tolerates cephalosporins, pt reports that she gets a rash all over her body and gets itchy       Medications  Prior to Admission Medications   Prescriptions Last Dose Informant Patient Reported? Taking?   ADVAIR DISKUS 100-50 MCG/ACT AEROSOL POWDER, BREATH ACTIVATED  MAR from Other Facility No No   Sig: Inhale 1 Puff 2 times a day.   Home Care Oxygen  Patient Yes No   Sig: Inhale 2 L/min every evening.   Thiamine HCl (VITAMIN B1) 100 MG Tab   Yes No    apixaban (ELIQUIS) 5mg Tab  MAR from Other Facility No No   Sig: Take 1 Tablet by mouth 2 times a day.   aripiprazole (ABILIFY) 30 MG tablet  MAR from Other Facility No No   Sig: Take 1 Tablet by mouth every morning. Indications: Major Depressive Disorder   atorvastatin (LIPITOR) 20 MG Tab  MAR from Other Facility No No   Sig: Take 1 Tablet by mouth at bedtime.   fluticasone (FLONASE) 50 MCG/ACT nasal spray  MAR from Other Facility Yes No   Sig: Administer 2 Sprays into each nostril every morning.   gabapentin (NEURONTIN) 300 MG Cap  MAR from Other Facility Yes No   Sig: Take 300 mg by mouth 3 times a day.   hydrOXYzine HCl (ATARAX) 25 MG Tab   Yes No   lidocaine (LIDODERM) 5 % Patch   Yes No   Sig: Place 1 Patch on the skin every 12 hours.   lidocaine (LMX) 4 % Cream   Yes No   Si application as needed Externally Three times a day for 30 days   meclizine (ANTIVERT) 25 MG Tab   No No   Sig: Take 1 Tablet by mouth 3 times a day as needed for Vertigo or Dizziness.   methylPREDNISolone (MEDROL) 4 MG Tab   Yes No   metoprolol SR (TOPROL XL) 25 MG TABLET SR 24 HR  MAR from Other Facility Yes No   Sig: Take 25 mg by mouth every day.   montelukast (SINGULAIR) 10 MG Tab  MAR from Other Facility No No   Sig: Take 1 Tablet by mouth every evening.   naproxen (NAPROSYN) 500 MG Tab   No No   Sig: Take 1 Tablet by mouth 2 times a day with meals.   nicotine (NICODERM) 14 MG/24HR PATCH 24 HR  MAR from Other Facility Yes No   Sig: Place 1 Patch on the skin every 24 hours.   nicotine (NICODERM) 7 MG/24HR PATCH 24 HR   Yes No   omeprazole (PRILOSEC) 20 MG delayed-release capsule   No No   Sig: Take 1 Capsule by mouth 2 times a day.   ondansetron (ZOFRAN ODT) 4 MG TABLET DISPERSIBLE   No No   Sig: Dissolve 1 Tablet by mouth every 6 hours as needed for Nausea/Vomiting.      Facility-Administered Medications: None       Physical Exam  Temp:  [36.6 °C (97.8 °F)-37.1 °C (98.8 °F)] 36.6 °C (97.8 °F)  Pulse:  [51-67] 61  Resp:  [13-28]  24  BP: (114-139)/(56-90) 132/63  SpO2:  [90 %-99 %] 95 %  Blood Pressure: (!) 128/90   Temperature: 36.7 °C (98 °F)   Pulse: (!) 52   Respiration: 19   Pulse Oximetry: 94 %       Physical Exam  Vitals and nursing note reviewed.   Constitutional:       General: She is not in acute distress.     Appearance: Normal appearance. She is not ill-appearing, toxic-appearing or diaphoretic.   HENT:      Head: Normocephalic and atraumatic.      Nose: No congestion or rhinorrhea.      Mouth/Throat:      Pharynx: No oropharyngeal exudate or posterior oropharyngeal erythema.   Eyes:      General: No scleral icterus.  Neck:      Vascular: No carotid bruit or JVD.   Cardiovascular:      Rate and Rhythm: Regular rhythm. Bradycardia present.      Pulses: Normal pulses.      Heart sounds: Normal heart sounds. No murmur heard.     No friction rub. No gallop.   Pulmonary:      Effort: Pulmonary effort is normal. No respiratory distress.      Breath sounds: No stridor. Wheezing present. No rhonchi or rales.   Abdominal:      General: Abdomen is flat. There is no distension.      Palpations: There is no mass.      Tenderness: There is no abdominal tenderness. There is no left CVA tenderness, guarding or rebound.      Hernia: No hernia is present.   Musculoskeletal:         General: No swelling. Normal range of motion.      Cervical back: No rigidity. No muscular tenderness.      Right lower leg: Edema present.      Left lower leg: Edema present.   Lymphadenopathy:      Cervical: No cervical adenopathy.   Skin:     General: Skin is warm and dry.      Capillary Refill: Capillary refill takes more than 3 seconds.      Coloration: Skin is not jaundiced or pale.      Findings: No bruising or erythema.   Neurological:      Mental Status: She is alert.         Laboratory:  Recent Labs     09/20/24  1245   WBC 7.7   RBC 4.44   HEMOGLOBIN 11.9*   HEMATOCRIT 39.2   MCV 88.3   MCH 26.8*   MCHC 30.4*   RDW 50.4*   PLATELETCT 206   MPV 12.2      Recent Labs     09/20/24  1245   SODIUM 142   POTASSIUM 4.6   CHLORIDE 106   CO2 24   GLUCOSE 75   BUN 13   CREATININE 0.83   CALCIUM 9.6     Recent Labs     09/20/24  1245   ALTSGPT 14   ASTSGOT 13   ALKPHOSPHAT 162*   TBILIRUBIN 0.6   GLUCOSE 75         Recent Labs     09/20/24  1245   NTPROBNP 416*         Recent Labs     09/20/24  1245   TROPONINT 36*       Imaging:  CT-CTA COMPLETE THORACOABDOMINAL AORTA   Final Result         1. Atherosclerosis without aneurysm or dissection.   2. Hepatosplenomegaly with possible cirrhosis.   3. Status post cholecystectomy.   4. Right thyroid nodule.                        DX-CHEST-PORTABLE (1 VIEW)   Final Result      No acute cardiac or pulmonary abnormalities are identified.          X-Ray:  I have personally reviewed the images and compared with prior images.  EKG:  I have personally reviewed the images and compared with prior images.    Assessment/Plan:  Justification for Admission Status  I anticipate this patient will require at least two midnights for appropriate medical management, necessitating inpatient admission because COPD exacerbation    Patient will need a Telemetry bed on MEDICAL service .  The need is secondary to COPD exacerbation.    * COPD exacerbation (HCC)- (present on admission)  Assessment & Plan  Continue home inhalers  Prednisone  DuoNebs every 4 hours  Keep saturation between 80 to 90%  Avoid sedative medications    Cirrhosis (HCC)  Assessment & Plan  New onset cirrhosis  Monitor volume status  Check INR and ammonia  IV Lasix ordered  Monitor strict ins and outs and daily weights    Acute on chronic systolic heart failure (HCC)  Assessment & Plan  Decompensated  Start IV Lasix  Monitor on telemetry  Cardiac echo was completed in July    Acute respiratory failure with hypoxia (HCC)- (present on admission)  Assessment & Plan  On 2 L of O2 above baseline    AF (atrial fibrillation) (HCC)- (present on admission)  Assessment &  Plan  Controlled  Continue Eliquis  Continue metoprolol    Pain in the chest- (present on admission)  Assessment & Plan  Trend troponins  Patient had a negative stress test last month  Monitor on telemetry        VTE prophylaxis: SCDs/TEDs

## 2024-09-21 NOTE — PROGRESS NOTES
Patient unable to participate in Med Rec Interview.  Tried calling [facility] at [phone] and left a voice message regarding MAR's for patient with our fax number and my [employer] Phone Number.  I also called patient pharmacy [facility] at [phone] but they are only open Monday through Friday.  Patient did tell me she took her meds yesterday 9/20/24 but she is not sure what they are.

## 2024-09-21 NOTE — CARE PLAN
Problem: Bronchoconstriction  Goal: Improve in air movement and diminished wheezing  Description: Target End Date:  2 to 3 days    1.  Implement inhaled treatments  2.  Evaluate and manage medication effects  Outcome: Not Met     Problem: Bronchopulmonary Hygiene  Goal: Increase mobilization of retained secretions  Description: Target End Date:  2 to 3 days    1.  Perform bronchopulmonary therapy as indicated by assessment  2.  Perform airway suctioning  3.  Perform actions to maintain patient airway  Outcome: Not Met

## 2024-09-21 NOTE — HOSPITAL COURSE
Lorene Haro is a 49-year-old female admitted on 09/20 with a diagnosis of COPD exacerbation. Her past medical history includes COPD, bipolar disorder, hypertension, atrial fibrillation, and morbid obesity, with a BMI of 52.74.     For the past week, she has experienced increasing shortness of breath and a productive cough, accompanied by nausea and vomiting at home. On the day of admission, she was unable to catch her breath and came to the hospital. A CT scan of the chest on admission revealed mild cardiomegaly, atelectasis versus scarring in the right middle lobe, and possible cirrhosis. EKG showed sinus bradycardia and incomplete right ventricular branch block.     An echocardiogram from July of this year showed a left ventricular ejection fraction of 59%, with no regional wall abnormalities. The patient tested negative for COVID-19, RSV, and influenza. NT-proBNP was 416, and troponin T was 38, which decreased to 36 the day after. This troponin level appears to be her baseline, based on previous tests. Procalcitonin levels were negative.    The patient was admitted for a COPD exacerbation and treated with prednisone 40 mg daily and nebulizers. On 09/22/2024, the patient was evaluated by physical therapy for compression therapy. She was able to ambulate independently through the hallway without significant limitation. PT/OT have recommended that post-acute placement is not necessary.     The patient denies any worsening shortness of breath, chest pain, or palpitations and has returned to her baseline care, which includes using 2 L of oxygen at home. As such, the patient is being discharged in good condition with a plan to continue a 5-day course of steroids at home.

## 2024-09-21 NOTE — ASSESSMENT & PLAN NOTE
Continue home inhalers.  Prednisone 40 mg daily for 5 days.  DuoNebs every 4 hours  Keep saturation between 80 to 90%  Avoid sedative medications   Bactrim Counseling:  I discussed with the patient the risks of sulfa antibiotics including but not limited to GI upset, allergic reaction, drug rash, diarrhea, dizziness, photosensitivity, and yeast infections.  Rarely, more serious reactions can occur including but not limited to aplastic anemia, agranulocytosis, methemoglobinemia, blood dyscrasias, liver or kidney failure, lung infiltrates or desquamative/blistering drug rashes.

## 2024-09-21 NOTE — ED NOTES
Medicated per MAR. Resting comfortably with calm unlabored breathing. No further needs at this time.

## 2024-09-21 NOTE — ASSESSMENT & PLAN NOTE
On 4L of oxygen now. Baseline of 2L at home.  -Continue oxygen supplementation.  -Chest x ray tomorrow if condition does not improve.  -Continue prednisone 40 mg daily.  -Nebulizers.

## 2024-09-21 NOTE — RESPIRATORY CARE
COPD EDUCATION by COPD CLINICAL EDUCATOR  9/21/2024  at  2:47 PM by Bulmaro Seals, RRT     Patient interviewed by education team.  Patient declined or is unable to participate in the full program.  Therefore, a short intervention has been conducted.  A comprehensive packet including information about COPD, types of treatments to manage their disease and safe home Oxygen usage was provided and reviewed with patient at the bedside.    Reviewed current medications with patient at bedside, gave spacer and flutter with demonstration. DME=[facility] 2L[facility] NOC quit smoking in 2019.    COPD Screen  COPD Risk Screening  Do you have a history of COPD?: Yes  Do you have a Pulmonologist?: Yes  COPD Population Screener  During the past 4 weeks, how much did you feel short of breath?: Some of the time  Do you ever cough up any mucus or phlegm?: No/only with occasional colds or infections  In the past 12 months, you do less than you used to because of your breathing problems: Disagree/unsure  Have you smoked at least 100 cigarettes in your entire life?: Yes  How old are you?: 50-59  COPD Screening Score: 4  COPD Coordinator Not Recommended: Yes    COPD Assessment  COPD Clinical Specialists ONLY  COPD Education Initiated: Yes--Short Intervention (Follow up pft scheduled quit smoking in 2019)  Is this a COPD exacerbation patient?: Yes (followed by unr)  DME Company: preferred  DME Equipment Type: o2 at 2lpm noc  Physician Name: Lili Reddy (follow up in december per PT)  Pulmonary Follow Up Appointment: 10/30/24  Appt Time: 1200  Pulmonologist Name: Maureen Archer ([facility])  $ Demo/Eval of SVN's, MDI's and Aerosols: Yes (gave  spacer and flutter valve and demonstrated use)  (OP) Pulmonary Function Testing: Yes (09/21/2021 FEV1=48 FEV1/FVC=86)  Interdisciplinary Rounds: Attendance at Rounds (30 Min)    PFT Results    09/21/2021 FEV1=48 FEV1/FVC=86    Meds to Beds  [facility] provides bedside medication delivery for all eligible  "patients at discharge and you have been automatically enrolled in the Meds to Beds Program. Would you like to opt out of this program for any reason?: No - Stay Opted In     MY COPD ACTION PLAN     It is recommended that patients and physicians /healthcare providers complete this action plan together. This plan should be discussed at each physician visit and updated as needed.    The green, yellow and red zones show groups of symptoms of COPD. This list of symptoms is not comprehensive, and you may experience other symptoms. In the \"Actions\" column, your healthcare provider has recommended actions for you to take based on your symptoms.    Patient Name: Lorene Haro   YOB: 1975   Last Updated on: 9/21/2024  2:46 PM   Green Zone:  I am doing well today Actions     Usual activitiy and exercise level   Take daily medications     Usual amounts of cough and phlegm/mucus   Use oxygen as prescribed     Sleep well at night   Continue regular exercise/diet plan     Appetite is good   At all times avoid cigarette smoke, inhaled irritants     Daily Medications (these medications are taken every day):   Fluticosone/Salmeterol (Advair) 1 Puff Twice daily     Additional Information:  Rinse mouth and spit after using Advair    Yellow Zone:  I am having a bad day or a COPD flare Actions     More breathless than usual   Continue daily medications     I have less energy for my daily activities   Use quick relief inhaler as ordered     Increased or thicker phlegm/mucus   Use oxygen as prescribed     Using quick relief inhaler/nebulizer more often   Get plenty of rest     Swelling of ankles more than usual   Use pursed lip breathing     More coughing than usual   At all times avoid cigarette smoke, inhaled irritants     I feel like I have a \"chest cold\"     Poor sleep and my symptoms woke me up     My appetite is not good     My medicine is not helping      Call provider immediately if symptoms don’t improve "     Continue daily medications, add rescue medications:   Albuterol  Albuterol 2 Puffs  3mL via nebulizer Every 6 hours PRN         Medications to be used during a flare up, (as Discussed with Provider):           Additional Information:  Use spacer with rescue inhaler  use nebulizer as directed by your Doctor    Red Zone:  I need urgent medical care Actions     Severe shortness of breath even at rest   Call 911 or seek medical care immediately     Not able to do any activity because of breathing      Fever or shaking chills      Feeling confused or very drowsy       Chest pains      Coughing up blood

## 2024-09-21 NOTE — ASSESSMENT & PLAN NOTE
Patient had a negative stress test last month  Monitor on telemetry  09/20 EKG showed sinus bradycardia, incomplete right ventricular branch block.  -Continue to monitor.

## 2024-09-21 NOTE — ASSESSMENT & PLAN NOTE
Decompensated.  Cardiac echo was completed in July and showed left ventricular ejection fraction 59%.  -Continue IV Lasix  -Monitor on telemetry

## 2024-09-21 NOTE — PROGRESS NOTES
[facility] Medicine Daily Progress Note    Date of Service  9/21/2024    [facility] Team: R ZOYA [facility] Team   Attending: [place] Guthrie M.d.  Senior Resident: Dr. Morgan Reyez  Intern:  Dr. Lázaro Amaya  Contact Number: [phone]    Chief Complaint  Lorene Haro is a 49 y.o. female admitted 9/20/2024 with COPD exacerbation    Hospital Course  Lorene Haro is a 49-year-old female admitted on 09/20 with a diagnosis of COPD exacerbation. Her past medical history includes COPD, bipolar disorder, hypertension, atrial fibrillation, and morbid obesity, with a BMI of 52.74.     For the past week, she has experienced increasing shortness of breath and a productive cough, accompanied by nausea and vomiting at home. On the day of admission, she was unable to catch her breath and came to the hospital. A CT scan of the chest on admission revealed mild cardiomegaly, atelectasis versus scarring in the right middle lobe, and possible cirrhosis. EKG showed sinus bradycardia and incomplete right ventricular branch block.     An echocardiogram from July of this year showed a left ventricular ejection fraction of 59%, with no regional wall abnormalities. The patient tested negative for COVID-19, RSV, and influenza. NT-proBNP was 416, and troponin T was 38, which decreased to 36 today. This troponin level appears to be her baseline, based on previous tests. Procalcitonin levels were negative.    The patient was admitted for a COPD exacerbation and is being treated with prednisone 40 mg daily and nebulizers. Her home medications are being continued, and we will follow her case closely.    Interval Problem Update    09/21: The patient reports feeling better compared to yesterday and denies shortness of breath. She is on her usual 2 L of oxygen, with a saturation of 95%. Wheezing is still present on physical exam. We will continue to monitor her condition and provide treatment for the COPD exacerbation.    I have discussed this  patient's plan of care and discharge plan at IDT rounds today with Case Management, Nursing, Nursing leadership, and other members of the IDT team.    Consultants/Specialty  None    Code Status  Full Code    Disposition  The patient is not medically cleared for discharge to home or a post-acute facility.      I have placed the appropriate orders for post-discharge needs.    Review of Systems  ROS     Constitutional: No chills or fever.  HENT: No ear pain, hearing loss, or tinnitus.  Eyes: No blurred vision or double vision.  Respiratory: Positive for cough, sputum production and mild shortness of breath.  Negative for hemoptysis  Cardiovascular: Negative for chest pain, palpitations, or leg swelling.  Gastrointestinal: No nausea, blood in stool, constipation, diarrhea, melena, or vomiting.  Genitourinary: No dysuria and urgency.  Skin: No itching.  Psychiatric/Behavioral: No depression or suicidal ideas.    Physical Exam  Temp:  [36.4 °C (97.5 °F)-37.1 °C (98.8 °F)] 36.5 °C (97.7 °F)  Pulse:  [52-74] 57  Resp:  [13-28] 20  BP: (108-139)/(51-90) 108/51  SpO2:  [90 %-98 %] 98 %    Physical Exam    Constitutional:  General: The patient is in acute distress.  Appearance: She is ill-appearing.  HENT:  Head: Normocephalic and atraumatic.  Mouth/Throat:  Mouth: Mucous membranes are moist.  Pharynx: Oropharynx is clear. No oropharyngeal exudate or posterior oropharyngeal erythema.  Eyes:  General: No scleral icterus.  Right eye: No discharge.  Left eye: No discharge.  Conjunctiva/sclera: Normal conjunctivae.  Cardiovascular:  Rate and Rhythm: Few episodes of bradychardia and regular rhythm.  Pulses: Normal pulses.  Heart sounds: Normal heart sounds. No murmur heard.  No friction rub. No gallop.  Pulmonary:  Effort: Pulmonary effort is normal. No respiratory distress.  Breath sounds: Diminished breath sounds. Wheezing is present.  Abdominal:  General: Abdominal girth is increased. Bowel sounds are normal.   Palpations:  Abdomen is soft. There are no masses.  Tenderness: There is no abdominal tenderness.  Hernia: No hernia is present.  Musculoskeletal:  General: No swelling or tenderness.  Right lower leg: No edema.  Left lower leg: No edema.  Skin:  General: Skin is warm and moist.  Coloration: Skin is not pale.  Neurological:  General: No focal deficit present.  Mental Status: She is alert and oriented to person, place, and time. Mental status is at baseline    Fluids    Intake/Output Summary (Last 24 hours) at 9/21/2024 1603  Last data filed at 9/21/2024 1200  Gross per 24 hour   Intake 1250 ml   Output 2100 ml   Net -850 ml       Laboratory  Recent Labs     09/20/24  1245 09/21/24  0035   WBC 7.7 7.6   RBC 4.44 4.41   HEMOGLOBIN 11.9* 12.0   HEMATOCRIT 39.2 39.0   MCV 88.3 88.4   MCH 26.8* 27.2   MCHC 30.4* 30.8*   RDW 50.4* 51.2*   PLATELETCT 206 183   MPV 12.2 12.2     Recent Labs     09/20/24  1245 09/21/24  0035   SODIUM 142 139   POTASSIUM 4.6 5.0   CHLORIDE 106 104   CO2 24 25   GLUCOSE 75 170*   BUN 13 14   CREATININE 0.83 0.95   CALCIUM 9.6 9.6     Recent Labs     09/21/24  0035   INR 1.25*               Imaging  CT-CTA COMPLETE THORACOABDOMINAL AORTA   Final Result         1. Atherosclerosis without aneurysm or dissection.   2. Hepatosplenomegaly with possible cirrhosis.   3. Status post cholecystectomy.   4. Right thyroid nodule.                        DX-CHEST-PORTABLE (1 VIEW)   Final Result      No acute cardiac or pulmonary abnormalities are identified.           Assessment/Plan  Problem Representation:    * COPD exacerbation (HCC)- (present on admission)  Assessment & Plan  Continue home inhalers.  Prednisone 40 mg daily for 5 days.  DuoNebs every 4 hours  Keep saturation between 80 to 90%  Avoid sedative medications    Acute on chronic systolic heart failure (HCC)  Assessment & Plan  Decompensated.  Cardiac echo was completed in July and showed left ventricular ejection fraction 59%.  -Continue IV Lasix  -Monitor  on telemetry    Cirrhosis (HCC)  Assessment & Plan  New onset cirrhosis  -Monitor volume status  -Continue IV Lasix ordered  -Monitor strict ins and outs and daily weights  -Trend labs    Acute respiratory failure with hypoxia (HCC)- (present on admission)  Assessment & Plan  On 4L of oxygen now. Baseline of 2L at home.  -Continue oxygen supplementation.  -Chest x ray tomorrow if condition does not improve.  -Continue prednisone 40 mg daily.  -Nebulizers.    AF (atrial fibrillation) (HCC)- (present on admission)  Assessment & Plan  Controlled  -Continue Eliquis  -Continue metoprolol    Pain in the chest- (present on admission)  Assessment & Plan  Patient had a negative stress test last month  Monitor on telemetry  09/20 EKG showed sinus bradycardia, incomplete right ventricular branch block.  -Continue to monitor.         VTE prophylaxis: SCDs/TEDs    I have performed a physical exam and reviewed and updated ROS and Plan today (9/21/2024). In review of yesterday's note (9/20/2024), there are no changes except as documented above.

## 2024-09-21 NOTE — PROGRESS NOTES
Per monitor room, patient had 10 beats psvt at 1116. Patient asymptomatic and is sleeping comfortably in bed. Dr Guthrie notified.

## 2024-09-21 NOTE — ED NOTES
Bedside report given to oncoming RN: Tiara. POC discussed with RN and patient. Call light within reach, all needs addressed at this time.       Fall risk interventions in place: Move the patient closer to the nurse's station, Patient's personal possessions are with in their safe reach, Place socks on patient, and Accompanied to restroom (all applicable per [place] Fall risk assessment)   Continuous monitoring: Cardiac Leads, Pulse Ox, or Blood Pressure  IVF/IV medications: Not Applicable   Oxygen: How many liters 2L  Bedside sitter: Not Applicable   Isolation: Not Applicable

## 2024-09-21 NOTE — ED NOTES
Patient transferred to the floor accompanied by Floor RN, patient AAO X4, respirations even and unlabored on 2 liters O2 via nasal canula, patient in possession of personal belongings, walker hand over to nurse along with belongings.

## 2024-09-21 NOTE — CARE PLAN
The patient is Stable - Low risk of patient condition declining or worsening    Shift Goals  Clinical Goals: monitor O2, skin integrity, orient to unit  Patient Goals: to eat  Family Goals: megan    Progress made toward(s) clinical / shift goals:    Problem: Impaired Gas Exchange  Goal: Patient will demonstrate improved ventilation and adequate oxygenation and participate in treatment regimen within the level of ability/situation.  Description: Target End Date:  Prior to discharge or change in level of care    1.   Assess/monitor rate/rhythm/depth of effort of respirations  2.   Assess oxygenation as ordered  3.   Administer/titrate oxygen as ordered  4.   Position patient for maximum ventilatory efficiency  5.   Turn, cough, and deep breathe  6.   Vital signs, pulse oximetry  7.   Assess color and body temperature  8.   Assess and monitor breath sounds  9.   Encourage deep-slow or pursed-lip breathing exercises  10. Monitor changes in the level of consciousness and mental status  11. Encourage expectoration of sputum; airway suctioning  12. Elevate the head of the bed and position patient for maximum ventilatory efficiency  13. Provide a calm, quiet environment  14. Limit patient's activity during the acute phase and have patient resume activity gradually and increase as individually tolerated  15. Evaluate sleep patterns and limit stimulants such as caffeine  16. Collaborate with RT to administer medications/treatments as ordered  Outcome: Progressing  Flowsheets (Taken 9/21/2024 0050)  Impaired Gas Exchange:   Assesed rate/rhythm/depth of effort of respirations   Positioned patient for maximum ventilatory efficiency   Assessed oxygenation   Administered/titrated oxygen   Turn, cough, and deep breathe   Assessed vital signs, pulse oximetry   Assessed color and body temperature   Assessed breath sounds   Encouraged deep-slow or pursed-lip breathing exercises   Elevated head of bed   Monitored changes in the level of  consciousness and mental status   Assisted patient assume a position to ease work of breathing   Limited patient's activity during the acute phase   Provided a calm, quiet environment   Collaborated with RT to administer medications/treatments as needed  Note: Patient on CPAP at [facility], monitored activity tolerance, patient mobilizes with own platform walker, tires quickly, assessed for worsening respiratory signs/sxs. HOB elevated.      Problem: Skin Integrity  Goal: Skin integrity is maintained or improved  Description: Target End Date:  Prior to discharge or change in level of care    Document interventions on Skin Risk/[place] flowsheet groups and corresponding LDA    1.  Assess and monitor skin integrity, appearance and/or temperature  2.  Assess risk factors for impaired skin integrity and/or pressures ulcers  3.  Implement precautions to protect skin integrity in collaboration with interdisciplinary team  4.  Implement pressure ulcer prevention protocol if at risk for skin breakdown  5.  Confirm wound care consult if at risk for skin breakdown  6.  Ensure patient use of pressure relieving devices  (Low air loss bed, waffle overlay, heel protectors, ROHO cushion, etc)  Outcome: Progressing  Note: Implemented wound RN protocol for moisture redness in pannus and groin. Interdry in place. Pt turns self from side to side. Wound care onboard. Pt educated about the importance of frequent repositioning to prevent skin breakdown. Encouraged turning in bed and notifying staff for help. Pt verbalized understanding. Appropriate dressings in place. Extra pillows in place for comfort, between knees, and to float heels. Barrier cream applied as appropriate. Pt cleaned and linens changed frequently as appropriate.

## 2024-09-21 NOTE — PROGRESS NOTES
4 Eyes Skin Assessment Completed by JC Mancuso and [employer].    Head Scabs, dryness   Ears Redness, dry, scabs  Nose dry, redness  Mouth WDL  Neck WDL  Breast/Chest WDL  Shoulder Blades WDL  Spine WDL  (R) Arm/Elbow/Hand WDL  (L) Arm/Elbow/Hand WDL  Abdomen Redness and Abrasion  Groin Redness and Blanching  Scrotum/Coccyx/Buttocks WDL  (R) Leg Redness and Edema, discoloration   (L) Leg Redness and Edema, discoloration   (R) Heel/Foot/Toe WDL  (L) Heel/Foot/Toe WDL          Devices In Places Tele Box, Pulse Ox, and Nasal Cannula, purewick    Interventions In Place Gray Ear Foams, NC W/Ear Foams, InterDry, and Pillows    Possible Skin Injury Yes    Pictures Uploaded Into Epic Yes  Wound Consult Placed N/A  RN Wound Prevention Protocol Ordered Yes

## 2024-09-21 NOTE — PROGRESS NOTES
Telemetry Report:      Rhythm:  SB-SR     Heart Rate: 56-65   Ectopy:  PACs          DC:  0.14           QRS:       0.09  QT:   0.39        Per Telestrip from Monitor Room

## 2024-09-22 ENCOUNTER — PHARMACY VISIT (OUTPATIENT)
Dept: PHARMACY | Facility: MEDICAL CENTER | Age: 49
End: 2024-09-22
Payer: COMMERCIAL

## 2024-09-22 VITALS
BODY MASS INDEX: 53.24 KG/M2 | WEIGHT: 281.97 LBS | TEMPERATURE: 97.9 F | HEART RATE: 62 BPM | RESPIRATION RATE: 16 BRPM | SYSTOLIC BLOOD PRESSURE: 119 MMHG | DIASTOLIC BLOOD PRESSURE: 52 MMHG | OXYGEN SATURATION: 92 % | HEIGHT: 61 IN

## 2024-09-22 LAB
ALBUMIN SERPL BCP-MCNC: 4 G/DL (ref 3.2–4.9)
ALBUMIN/GLOB SERPL: 1.7 G/DL
ALP SERPL-CCNC: 136 U/L (ref 30–99)
ALT SERPL-CCNC: 11 U/L (ref 2–50)
ANION GAP SERPL CALC-SCNC: 9 MMOL/L (ref 7–16)
AST SERPL-CCNC: 9 U/L (ref 12–45)
BASOPHILS # BLD AUTO: 0.1 % (ref 0–1.8)
BASOPHILS # BLD: 0.01 K/UL (ref 0–0.12)
BILIRUB SERPL-MCNC: 0.6 MG/DL (ref 0.1–1.5)
BUN SERPL-MCNC: 17 MG/DL (ref 8–22)
CALCIUM ALBUM COR SERPL-MCNC: 9.5 MG/DL (ref 8.5–10.5)
CALCIUM SERPL-MCNC: 9.5 MG/DL (ref 8.5–10.5)
CHLORIDE SERPL-SCNC: 100 MMOL/L (ref 96–112)
CO2 SERPL-SCNC: 30 MMOL/L (ref 20–33)
CREAT SERPL-MCNC: 1.07 MG/DL (ref 0.5–1.4)
EOSINOPHIL # BLD AUTO: 0.01 K/UL (ref 0–0.51)
EOSINOPHIL NFR BLD: 0.1 % (ref 0–6.9)
ERYTHROCYTE [DISTWIDTH] IN BLOOD BY AUTOMATED COUNT: 50.4 FL (ref 35.9–50)
GFR SERPLBLD CREATININE-BSD FMLA CKD-EPI: 63 ML/MIN/1.73 M 2
GLOBULIN SER CALC-MCNC: 2.4 G/DL (ref 1.9–3.5)
GLUCOSE SERPL-MCNC: 105 MG/DL (ref 65–99)
HCT VFR BLD AUTO: 37 % (ref 37–47)
HGB BLD-MCNC: 11.4 G/DL (ref 12–16)
IMM GRANULOCYTES # BLD AUTO: 0.02 K/UL (ref 0–0.11)
IMM GRANULOCYTES NFR BLD AUTO: 0.2 % (ref 0–0.9)
LYMPHOCYTES # BLD AUTO: 1.19 K/UL (ref 1–4.8)
LYMPHOCYTES NFR BLD: 14.1 % (ref 22–41)
MCH RBC QN AUTO: 27 PG (ref 27–33)
MCHC RBC AUTO-ENTMCNC: 30.8 G/DL (ref 32.2–35.5)
MCV RBC AUTO: 87.5 FL (ref 81.4–97.8)
MONOCYTES # BLD AUTO: 0.49 K/UL (ref 0–0.85)
MONOCYTES NFR BLD AUTO: 5.8 % (ref 0–13.4)
NEUTROPHILS # BLD AUTO: 6.72 K/UL (ref 1.82–7.42)
NEUTROPHILS NFR BLD: 79.7 % (ref 44–72)
NRBC # BLD AUTO: 0 K/UL
NRBC BLD-RTO: 0 /100 WBC (ref 0–0.2)
PLATELET # BLD AUTO: 195 K/UL (ref 164–446)
PMV BLD AUTO: 11.5 FL (ref 9–12.9)
POTASSIUM SERPL-SCNC: 4.1 MMOL/L (ref 3.6–5.5)
PROT SERPL-MCNC: 6.4 G/DL (ref 6–8.2)
RBC # BLD AUTO: 4.23 M/UL (ref 4.2–5.4)
SODIUM SERPL-SCNC: 139 MMOL/L (ref 135–145)
WBC # BLD AUTO: 8.4 K/UL (ref 4.8–10.8)

## 2024-09-22 PROCEDURE — 97535 SELF CARE MNGMENT TRAINING: CPT

## 2024-09-22 PROCEDURE — 85025 COMPLETE CBC W/AUTO DIFF WBC: CPT

## 2024-09-22 PROCEDURE — 97166 OT EVAL MOD COMPLEX 45 MIN: CPT

## 2024-09-22 PROCEDURE — 97162 PT EVAL MOD COMPLEX 30 MIN: CPT

## 2024-09-22 PROCEDURE — 700102 HCHG RX REV CODE 250 W/ 637 OVERRIDE(OP): Performed by: HOSPITALIST

## 2024-09-22 PROCEDURE — RXMED WILLOW AMBULATORY MEDICATION CHARGE

## 2024-09-22 PROCEDURE — 99239 HOSP IP/OBS DSCHRG MGMT >30: CPT | Performed by: INTERNAL MEDICINE

## 2024-09-22 PROCEDURE — A9270 NON-COVERED ITEM OR SERVICE: HCPCS | Performed by: HOSPITALIST

## 2024-09-22 PROCEDURE — 80053 COMPREHEN METABOLIC PANEL: CPT

## 2024-09-22 PROCEDURE — 700111 HCHG RX REV CODE 636 W/ 250 OVERRIDE (IP): Performed by: HOSPITALIST

## 2024-09-22 PROCEDURE — 36415 COLL VENOUS BLD VENIPUNCTURE: CPT

## 2024-09-22 RX ORDER — PREDNISONE 20 MG/1
TABLET ORAL
Qty: 3 TABLET | Refills: 0 | Status: SHIPPED | OUTPATIENT
Start: 2024-09-23 | End: 2024-09-25

## 2024-09-22 RX ADMIN — METOPROLOL SUCCINATE 25 MG: 25 TABLET, EXTENDED RELEASE ORAL at 06:10

## 2024-09-22 RX ADMIN — MOMETASONE FUROATE AND FORMOTEROL FUMARATE DIHYDRATE 2 PUFF: 100; 5 AEROSOL RESPIRATORY (INHALATION) at 08:45

## 2024-09-22 RX ADMIN — ARIPIPRAZOLE 30 MG: 15 TABLET ORAL at 06:10

## 2024-09-22 RX ADMIN — GABAPENTIN 300 MG: 300 CAPSULE ORAL at 06:10

## 2024-09-22 RX ADMIN — APIXABAN 5 MG: 5 TABLET, FILM COATED ORAL at 06:10

## 2024-09-22 RX ADMIN — PREDNISONE 40 MG: 20 TABLET ORAL at 06:10

## 2024-09-22 RX ADMIN — ACETAMINOPHEN 650 MG: 325 TABLET ORAL at 08:45

## 2024-09-22 RX ADMIN — GABAPENTIN 300 MG: 300 CAPSULE ORAL at 13:38

## 2024-09-22 RX ADMIN — OMEPRAZOLE 20 MG: 20 CAPSULE, DELAYED RELEASE ORAL at 08:42

## 2024-09-22 ASSESSMENT — FIBROSIS 4 INDEX: FIB4 SCORE: 0.71

## 2024-09-22 ASSESSMENT — COGNITIVE AND FUNCTIONAL STATUS - GENERAL
DAILY ACTIVITIY SCORE: 22
SUGGESTED CMS G CODE MODIFIER DAILY ACTIVITY: CJ
CLIMB 3 TO 5 STEPS WITH RAILING: A LITTLE
SUGGESTED CMS G CODE MODIFIER MOBILITY: CI
DRESSING REGULAR LOWER BODY CLOTHING: A LITTLE
MOBILITY SCORE: 23
HELP NEEDED FOR BATHING: A LITTLE

## 2024-09-22 ASSESSMENT — GAIT ASSESSMENTS
GAIT LEVEL OF ASSIST: STANDBY ASSIST
ASSISTIVE DEVICE: FRONT WHEEL WALKER
DISTANCE (FEET): 150
DEVIATION: BRADYKINETIC;DECREASED HEEL STRIKE;DECREASED TOE OFF

## 2024-09-22 ASSESSMENT — PAIN DESCRIPTION - PAIN TYPE
TYPE: ACUTE PAIN

## 2024-09-22 ASSESSMENT — ACTIVITIES OF DAILY LIVING (ADL): TOILETING: INDEPENDENT

## 2024-09-22 NOTE — DISCHARGE INSTRUCTIONS
It was a pleasure taking care of you during this hospitalization.  Please continue on his recommendations:  -Continue taking prednisone 20 milligrams a day for another 3 days.  -Continue your home medications as it was prescribed in the past.  -Schedule an appointment with your primary care provider soon as possible for further management and potential med changes.  -Maintain healthy habits and ready to consult on carbohydrates in your diet.  -Try to perform cardiovascular exercises at least 3 times a week no less than 30 minutes per session.    Please reach out if you have any question about your management and hospital course.

## 2024-09-22 NOTE — HOSPITAL COURSE
Lorene Haro is a 49-year-old female admitted on 09/20 with a diagnosis of COPD exacerbation. Her past medical history includes COPD, bipolar disorder, hypertension, atrial fibrillation, and morbid obesity, with a BMI of 52.74.     For the past week, she has experienced increasing shortness of breath and a productive cough, accompanied by nausea and vomiting at home. On the day of admission, she was unable to catch her breath and came to the hospital. A CT scan of the chest on admission revealed mild cardiomegaly, atelectasis versus scarring in the right middle lobe, and possible cirrhosis. EKG showed sinus bradycardia and incomplete right ventricular branch block.     An echocardiogram from July of this year showed a left ventricular ejection fraction of 59%, with no regional wall abnormalities. The patient tested negative for COVID-19, RSV, and influenza. NT-proBNP was 416, and troponin T was 38, which decreased to 36 today. This troponin level appears to be her baseline, based on previous tests. Procalcitonin levels were negative.    The patient was admitted for a COPD exacerbation and is being treated with prednisone 40 mg daily and nebulizers. Her home medications are being continued, and we will follow her case closely.

## 2024-09-22 NOTE — PROGRESS NOTES
Monitor summary:     Rhythm : SB/SR  Rate : 55-72  Ectopy : PACs, 10 beats PSVT asymptomatic, MD aware     AR=.17  QRS=.11  QT=.41        Per telemetry strip summary from monitor room.

## 2024-09-22 NOTE — PROGRESS NOTES
Patient dsicharged with [facility] as transport. IV and tele removed, AVS signed, meds to beds delivered. Patient discharged with all belongings. Met [employer] , Robbie, in front of hospital; patient to be transported back to group home.

## 2024-09-22 NOTE — THERAPY
"Occupational Therapy   Initial Evaluation     Patient Name: Lorene Haro  Age:  49 y.o., Sex:  female  Medical Record #: [id number]  Today's Date: 9/22/2024      Assessment    Patient is 49 y.o. female admitted with COPD exacerbation, pmhx includes COPD, bipolar disorder, hypertension, atrial fibrillation, and morbid obesity. Pt presents to OT eval able to demonstrate self-care ADLs at her baseline of functional independence, no assistance required. Pt lives at a group home where she receives assist for IADLs and supervision as needed. Anticipate return to group home once medically cleared, no additional OT needs at this time.     Plan    Occupational Therapy Initial Treatment Plan   Duration: Evaluation only    DC Equipment Recommendations: None (tub bench would be ideal for her bathroom but she has a shower chair \"that works\")  Discharge Recommendations: Anticipate that the patient will have no further occupational therapy needs after discharge from the hospital      Objective       09/22/24 1115   Prior Living Situation   Prior Services Continuous (24 Hour) Care Giving Per Service   Housing / Facility Group Home   Steps Into Home 1   Steps In Home 0   Bathroom Set up Bathtub / Shower Combination;Shower Chair   Equipment Owned 4-Wheel Walker;Tub / Shower Seat;Oxygen   Lives with - Patient's Self Care Capacity Unrelated Adult   Comments pt lives at a group home where staff manage medication and meals   Prior Level of ADL Function   Self Feeding Independent   Grooming / Hygiene Independent   Bathing Independent   Dressing Independent   Toileting Independent   Prior Level of IADL Function   Medication Management Requires Assist   Laundry Requires Assist   Kitchen Mobility Independent   Finances Requires Assist   Home Management Requires Assist   Shopping Requires Assist   Prior Level Of Mobility Independent With Device in Community;Independent With Device in Home   Comments pt report difficulty with 4WW in her home " d/t too wide   Pain 0 - 10 Group   Therapist Pain Assessment 0;Nurse Notified;Post Activity Pain Same as Prior to Activity   Cognition    Cognition / Consciousness X   Speech/ Communication Delayed Responses   Level of Consciousness Alert   Ability To Follow Commands 2 Step   New Learning Impaired   Attention Impaired   Comments pleasant and cooperative   Active ROM Upper Body   Active ROM Upper Body  WDL   Strength Upper Body   Upper Body Strength  WDL   Sensation Upper Body   Upper Extremity Sensation  WDL   Upper Body Muscle Tone   Upper Body Muscle Tone  WDL   Coordination Upper Body   Coordination WDL   Balance Assessment   Sitting Balance (Static) Fair +   Sitting Balance (Dynamic) Fair +   Standing Balance (Static) Fair   Standing Balance (Dynamic) Fair   Weight Shift Sitting Fair   Weight Shift Standing Fair   Comments FWW   Bed Mobility    Comments in chair pre, up with PT post   ADL Assessment   Eating Independent   Grooming Standing;Supervision   Upper Body Dressing Supervision   Lower Body Dressing Supervision   Toileting Supervision   How much help from another person does the patient currently need...   Putting on and taking off regular lower body clothing? 3   Bathing (including washing, rinsing, and drying)? 3   Toileting, which includes using a toilet, bedpan, or urinal? 4   Putting on and taking off regular upper body clothing? 4   Taking care of personal grooming such as brushing teeth? 4   Eating meals? 4   6 Clicks Daily Activity Score 22   Functional Mobility   Sit to Stand Supervised   Bed, Chair, Wheelchair Transfer Supervised   Toilet Transfers Supervised   Transfer Method Stand Step   Mobility FWW   Activity Tolerance   Comments pt is at her self-identified baseline of activity tolerance   Education Group   Education Provided Role of Occupational Therapist;Activities of Daily Living   Role of Occupational Therapist Patient Response Patient;Acceptance;Explanation;Verbal Demonstration   ADL  Patient Response Patient;Acceptance;Explanation;Verbal Demonstration   Occupational Therapy Initial Treatment Plan    Duration Evaluation only   Problem List   Problem List None   Anticipated Discharge Equipment and Recommendations   DC Equipment Recommendations None   Discharge Recommendations Anticipate that the patient will have no further occupational therapy needs after discharge from the hospital

## 2024-09-22 NOTE — CARE PLAN
Problem: Bronchoconstriction  Goal: Improve in air movement and diminished wheezing  Description: Target End Date:  2 to 3 days    1.  Implement inhaled treatments  2.  Evaluate and manage medication effects  Outcome: Progressing     Problem: Bronchopulmonary Hygiene  Goal: Increase mobilization of retained secretions  Description: Target End Date:  2 to 3 days    1.  Perform bronchopulmonary therapy as indicated by assessment  2.  Perform airway suctioning  3.  Perform actions to maintain patient airway  Outcome: Progressing    SVN Q4 DUO  FLUTTER QID

## 2024-09-22 NOTE — DISCHARGE SUMMARY
Attending Physician Attestation:   Patient seen and examined with resident team on day of discharge.   I agree with the examination and assessment/plan as listed in resident note.  Additional attending comments: Very pleasant 49F with PMH of COPD admitted with exacerbation, responded well to treatment and up ambulating hallway on day of discharge.  Stable for discharge home with close outpatient follow-up.  See resident note.  I agree with discharge documentation which reflects my direct input.  Overall personal time spent on discharge of this patient is 40 minutes.  [place]ia Guthrie MD   R Hospitalist       R Internal Medicine Discharge Summary    Attending: [place] Guthrie M.d.  Senior Resident: Dr. Morgan Reyez  Intern:  Dr. Lázaro Amaya  Contact Number: [phone]    CHIEF COMPLAINT ON ADMISSION  Chief Complaint   Patient presents with    Shortness of Breath     Patient reports SOB and productive cough x 1 week. History of COPD       Reason for Admission  COPD exacerbation    Admission Date  9/20/2024    CODE STATUS  Full Code    HPI & HOSPITAL COURSE    Lorene Haro is a 49-year-old female admitted on 09/20 with a diagnosis of COPD exacerbation. Her past medical history includes COPD, bipolar disorder, hypertension, atrial fibrillation, and morbid obesity, with a BMI of 52.74.     For the past week, she has experienced increasing shortness of breath and a productive cough, accompanied by nausea and vomiting at home. On the day of admission, she was unable to catch her breath and came to the hospital. A CT scan of the chest on admission revealed mild cardiomegaly, atelectasis versus scarring in the right middle lobe, and possible cirrhosis. EKG showed sinus bradycardia and incomplete right ventricular branch block.     An echocardiogram from July of this year showed a left ventricular ejection fraction of 59%, with no regional wall abnormalities. The patient tested negative for COVID-19, RSV, and  influenza. NT-proBNP was 416, and troponin T was 38, which decreased to 36 the day after. This troponin level appears to be her baseline, based on previous tests. Procalcitonin levels were negative.    The patient was admitted for a COPD exacerbation and treated with prednisone 40 mg daily and nebulizers. On 09/22/2024, the patient was evaluated by physical therapy for compression therapy. She was able to ambulate independently through the hallway without significant limitation. PT/OT have recommended that post-acute placement is not necessary.     The patient denies any worsening shortness of breath, chest pain, or palpitations and has returned to her baseline care, which includes using 2 L of oxygen at home. As such, the patient is being discharged in good condition with a plan to continue a 5-day course of steroids at home.      Therefore, she is discharged in good and stable condition to home with close outpatient follow-up.    The patient met 2-midnight criteria for an inpatient stay at the time of discharge.    Discharge Date  09/22//24    Physical Exam on Day of Discharge  Physical Exam    Constitutional:  General: The patient is in no acute distress.  Appearance: She is not ill-appearing.  HENT:  Head: Normocephalic and atraumatic.  Mouth/Throat:  Mouth: Mucous membranes are moist.  Pharynx: Oropharynx is clear. No oropharyngeal exudate or posterior oropharyngeal erythema.  Eyes:  General: No scleral icterus.  Right eye: No discharge.  Left eye: No discharge.  Conjunctiva/sclera: Normal conjunctivae.  Cardiovascular:  Rate and Rhythm: Few episodes of bradychardia with regular rhythm.  Pulses: Normal pulses.  Heart sounds: Normal heart sounds. No murmur heard.  No friction rub. No gallop.  Pulmonary:  Effort: Pulmonary effort is normal. No respiratory distress.  Breath sounds: Diminished breath sounds bilaterally corresponding with baseline diagnosis of COPD.  Abdominal:  General: Abdominal girth is  increased. Bowel sounds are normal.   Palpations: Abdomen is soft. There are no masses.  Tenderness: There is no abdominal tenderness.  Hernia: No hernia is present.  Musculoskeletal:  General: No swelling or tenderness.  Right lower leg: No edema.  Left lower leg: No edema.  Skin:  General: Skin is warm and moist.  Coloration: Skin is not pale.  Neurological:  General: No focal deficit present.  Mental Status: She is alert and oriented to person, place, and time. Mental status is at baseline    FOLLOW UP ITEMS POST DISCHARGE  Complete a total course of 5 days of steroids.    DISCHARGE DIAGNOSES  Principal Problem:    COPD exacerbation (HCC) (POA: Yes)  Active Problems:    Acute on chronic systolic heart failure (HCC) (POA: Unknown)    Pain in the chest (POA: Yes)    AF (atrial fibrillation) (HCC) (POA: Yes)    Acute respiratory failure with hypoxia (HCC) (POA: Yes)    Cirrhosis (HCC) (POA: Unknown)  Resolved Problems:    * No resolved hospital problems. *      FOLLOW UP  Future Appointments   Date Time Provider Department [place]   [facility] [place]   10/30/2024 12:00 PM [facility] None   11/7/2024 10:30 AM [facility] None   11/20/2024  2:00 PM [facility] None   12/2/2024  3:40 PM [facility] None   12/6/2024  4:00 PM [facility] None     No follow-up provider specified.    MEDICATIONS ON DISCHARGE     Medication List        START taking these medications        Instructions   predniSONE 20 MG Tabs  Start taking on: September 23, 2024  Commonly known as: Deltasone   take 1 tablet by mouth daily in the morning with your breakfast for 3 days            CONTINUE taking these medications        Instructions   Advair Diskus 100-50 MCG/ACT Aepb  Generic drug: fluticasone-salmeterol   Doctor's comments: 3 month supply x 1 year  Inhale 1 Puff 2 times a day.  Dose: 1 Puff     apixaban 5mg Tabs  Commonly known as: Eliquis   Take  1 Tablet by mouth 2 times a day.  Dose: 5 mg     aripiprazole 30 MG tablet  Commonly known as: Abilify   Take 1 Tablet by mouth every morning. Indications: Major Depressive Disorder  Dose: 30 mg     atorvastatin 20 MG Tabs  Commonly known as: Lipitor   Take 1 Tablet by mouth at bedtime.  Dose: 20 mg     fluticasone 50 MCG/ACT nasal spray  Commonly known as: Flonase   Administer 2 Sprays into each nostril every morning.  Dose: 2 Spray     gabapentin 300 MG Caps  Commonly known as: Neurontin   Take 300 mg by mouth 3 times a day.  Dose: 300 mg     Home Care Oxygen   Inhale 2 L/min every evening. DME = Preferred  Dose: 2 L/min     hydrOXYzine HCl 25 MG Tabs  Commonly known as: Atarax      * lidocaine 5 % Ptch  Commonly known as: Lidoderm   Place 1 Patch on the skin every 12 hours.  Dose: 1 Patch     * lidocaine 4 % Crea  Commonly known as: Lmx   1 application as needed Externally Three times a day for 30 days     meclizine 25 MG Tabs  Commonly known as: Antivert   Take 1 Tablet by mouth 3 times a day as needed for Vertigo or Dizziness.  Dose: 25 mg     metoprolol SR 25 MG Tb24  Commonly known as: Toprol XL   Take 25 mg by mouth every day.  Dose: 25 mg     montelukast 10 MG Tabs  Commonly known as: Singulair   Doctor's comments: 3 month supply x 1 year  Take 1 Tablet by mouth every evening.  Dose: 10 mg     naproxen 500 MG Tabs  Commonly known as: Naprosyn   Take 1 Tablet by mouth 2 times a day with meals.  Dose: 500 mg     * nicotine 14 MG/24HR Pt24  Commonly known as: Nicoderm   Place 1 Patch on the skin every 24 hours.  Dose: 1 Patch     * nicotine 7 MG/24HR Pt24  Commonly known as: Nicoderm      omeprazole 20 MG delayed-release capsule  Commonly known as: PriLOSEC   Take 1 Capsule by mouth 2 times a day.  Dose: 20 mg     ondansetron 4 MG Tbdp  Commonly known as: Zofran ODT   Dissolve 1 Tablet by mouth every 6 hours as needed for Nausea/Vomiting.  Dose: 4 mg     Vitamin B1 100 MG Tabs            * This list has 4  medication(s) that are the same as other medications prescribed for you. Read the directions carefully, and ask your doctor or other care provider to review them with you.                STOP taking these medications      methylPREDNISolone 4 MG Tabs  Commonly known as: Medrol              Allergies  Allergies   Allergen Reactions    Penicillin G Rash and Itching     pt reports that she gets a rash all over her body and gets itchy    Amoxicillin Rash and Itching     Tolerates cephalosporins, pt reports that she gets a rash all over her body and gets itchy       DIET  Orders Placed This Encounter   Procedures    Diet Order Diet: 2 Gram Sodium     Standing Status:   Standing     Number of Occurrences:   1     Order Specific Question:   Diet:     Answer:   2 Gram Sodium [7]    Discontinue Diet Tray     Standing Status:   Standing     Number of Occurrences:   1       ACTIVITY  As tolerated.  Exercise encouraged.  Weight bearing as tolerated    CONSULTATIONS  None    PROCEDURES  None    LABORATORY  Lab Results   Component Value Date    SODIUM 139 09/22/2024    POTASSIUM 4.1 09/22/2024    CHLORIDE 100 09/22/2024    CO2 30 09/22/2024    GLUCOSE 105 (H) 09/22/2024    BUN 17 09/22/2024    CREATININE 1.07 09/22/2024    CREATININE 0.9 09/11/2008        Lab Results   Component Value Date    WBC 8.4 09/22/2024    HEMOGLOBIN 11.4 (L) 09/22/2024    HEMATOCRIT 37.0 09/22/2024    PLATELETCT 195 09/22/2024        Total time of the discharge process exceeds 40 minutes.

## 2024-09-22 NOTE — CARE PLAN
The patient is Stable - Low risk of patient condition declining or worsening    Shift Goals  Clinical Goals: Monitor resp status, monitor I/Os, VSS  Patient Goals: Rest  Family Goals: ARAM      Problem: Knowledge Deficit - COPD  Goal: Patient/significant other demonstrates understanding of disease process, utilization of the Action Plan, medications and discharge instruction  Description: Target End Date:  1-3 days or as soon as patient condition allows    Document in Patient Education    1.  Discuss the importance of medical follow-up care, periodic chest x-rays, sputum cultures  2.  Review worsening signs and symptoms of COPD flare and exacerbation and its management  3.  Discuss respiratory medications, side effects, adverse reactions  4.  Demonstrate technique for using a metered-dose inhaler (MDI) and utilization of a spacer  5.  Review the COPD Action Plan  6.  Instruct and reinforce the rationale for breathing exercises, coughing effectively, and general conditioning exercises  7.  Stress importance of oral care and dental hygiene  8.  Discuss the importance of avoiding people with active respiratory infections and need for routine influenza and pneumococcal vaccinations  9.  Discuss factors that may trigger condition and encourage patient/significant other to explore ways to control these factors in and around the home and work setting  10. Review the harmful effects of smoking and advise cessation of smoking  11. Provide information about activity limitations and alternating activities with rest periods to prevent fatigue  12. Instruct asthmatic patient of use of peak flow meter, as appropriate  13. Review oxygen requirements and dosage, safe use of oxygen, and refer to the supplier as indicated  14. Educate patient/family/caregiver on the use of Nasal Intermittent Positive Pressure Ventilation (NIPPV) and possible adverse reactions  Outcome: Progressing       Progress made toward(s) clinical / shift goals:  educated patient on COPD as well as meds and s/s of exacerbation.; COPD educator saw patient at bedside as well. O2 to be weaned as tolerated       Problem: Skin Integrity  Goal: Skin integrity is maintained or improved  Description: Target End Date:  Prior to discharge or change in level of care    Document interventions on Skin Risk/[place] flowsheet groups and corresponding LDA    1.  Assess and monitor skin integrity, appearance and/or temperature  2.  Assess risk factors for impaired skin integrity and/or pressures ulcers  3.  Implement precautions to protect skin integrity in collaboration with interdisciplinary team  4.  Implement pressure ulcer prevention protocol if at risk for skin breakdown  5.  Confirm wound care consult if at risk for skin breakdown  6.  Ensure patient use of pressure relieving devices  (Low air loss bed, waffle overlay, heel protectors, ROHO cushion, etc)  Outcome: Progressing     Progress made toward(s) clinical / shift goals: patient is red and moist in panus and groin folds; site cleansed, site care given, and interdry applied     Patient is not progressing towards the following goals: NA

## 2024-09-22 NOTE — DISCHARGE PLANNING
Case Management Discharge Planning    Admission Date: 9/20/2024  GMLOS: 3.9  ALOS: 2    6-Clicks ADL Score: 22  6-Clicks Mobility Score: 23    Notified by RN pt needs assistance with transportation home.   Called  owner Shirley ([phone]) several times and left  requesting call back to confirm pt is able to return and that someone will be at the . Attempted to call [facility]  as well at [phone] and [phone]- no answer.     Met with pt at bedside. Pt states  owner texted her saying she's at [facility] but they're aware she is returning today. Pt states a caretaker is always at the . Pt additionally states she frequently uses [facility] for taxi or uber rides. Pt has her walker with her and states is comfortable going taxi or uber.     Called [facility] [phone] and spoke with Natalia- they will arrange lyft ride for 1630 and pt will get /car details via text. Confirmed with pt she has her cell phone and is familiar with lyft process.     RN notified

## 2024-09-22 NOTE — THERAPY
Physical Therapy   Initial Evaluation     Patient Name: Lorene Haro  Age:  49 y.o., Sex:  female  Medical Record #: [id number]  Today's Date: 9/22/2024     Precautions  Precautions: Other (See Comments) (Standard precautions)    Assessment  Patient is 49 y.o. presenting for COPD exacerbation and new onset cirrhosis w/ a PMH of COPD, bipolar disorder, HTN, afib on Eliquis, and morbid obesity.  She lives in a [facility] where she receives 24/7 assistance primarily for IADL's (see flowsheet for home set-up and PLOF).    Currently, the pt displays reduced overall endurance/activity tolerance, otherwise requiring minimal assistance for functional mobility tasks.  She was received seated up in her chair, and able to demo STS SBA using FWW.  The pt completed gait 150' (x2, 1 seated rest break) SBA using FWW via slow marvin and limited heel-toe pattern, no LOB observed and distance limited by fatigue and SOB.  Provided pt edu re: risks of prolonged bed rest and benefits of upright mobility including preventing m. atrophy w/ pt understanding verbalized. Anticipate the pt will have no further PT needs upon DC given near-baseline independence currently demo'd w/ mobility tasks.  Will not follow, please re consult should there be a change in the pt's condition.    Plan    Physical Therapy Initial Treatment Plan   Duration: Evaluation only    DC Equipment Recommendations: None  Discharge Recommendations: Anticipate that the patient will have no further physical therapy needs after discharge from the hospital       Subjective/Objective       09/22/24 1128   Prior Living Situation   Prior Services Continuous (24 Hour) Care Giving Per Service   Housing / Facility Group Home   Steps Into Home 1   Steps In Home 0   Equipment Owned 4-Wheel Walker   Lives with - Patient's Self Care Capacity Unrelated Adult   Comments Pt lives at [facility] where she received 24/7 assistance from staff for IADL's   Prior Level of  Functional Mobility   Bed Mobility Independent   Transfer Status Independent   Ambulation Independent   Ambulation Distance Community distances   Assistive Devices Used 4-Wheel Walker   Stairs Independent   History of Falls   History of Falls Yes   Date of Last Fall   (Pt reports falling 1mo ago (previous admit) w/ no residual injury)   Cognition    Cognition / Consciousness X   Speech/ Communication Dysarthric   Level of Consciousness Alert   Comments Pt pleasant and cooperative   Passive ROM Lower Body   Passive ROM Lower Body WDL   Active ROM Lower Body    Active ROM Lower Body  WDL   Comments observed during functional mobility tasks   Strength Lower Body   Lower Body Strength  WDL   Comments BLE grossly 4+/5   Sensation Lower Body   Lower Extremity Sensation   WDL   Comments pt denies numbness/tingling in her LE's   Coordination Lower Body    Coordination Lower Body  Not Tested   Other Treatments   Other Treatments Provided Edu provided re: risks of prolonged bed rest and benefits of upright mobility   Balance Assessment   Sitting Balance (Static) Fair +   Sitting Balance (Dynamic) Fair +   Standing Balance (Static) Fair   Standing Balance (Dynamic) Fair   Weight Shift Sitting Good   Weight Shift Standing Fair   Comments stand w/ FWW   Bed Mobility    Comments pt position seated in chair pre- and post-assessment   Gait Analysis   Gait Level Of Assist Standby Assist   Assistive Device Front Wheel Walker   Distance (Feet) 150   # of Times Distance was Traveled 2   Deviation Bradykinetic;Decreased Heel Strike;Decreased Toe Off   Weight Bearing Status no restrictions   Vision Deficits Impacting Mobility pt denies   Comments distance limited by fatigue and SOB   Functional Mobility   Sit to Stand Standby Assist   Bed, Chair, Wheelchair Transfer Contact Guard Assist   Transfer Method Stand Step   Mobility STS chair w/ FWW; chair<>hallway w/ FWW   Activity Tolerance   Sitting in Chair >30min   Standing 12min   Edema /  Skin Assessment   Edema / Skin  Not Assessed   Education Group   Education Provided Role of Physical Therapist   Role of Physical Therapist Patient Response Patient;Acceptance;Explanation;Demonstration;Action Demonstration   Additional Comments pt receptive of edu provided   Problem List    Problems Decreased Activity Tolerance   Interdisciplinary Plan of Care Collaboration   IDT Collaboration with  Nursing;Occupational Therapist   Patient Position at End of Therapy Seated;Call Light within Reach;Tray Table within Reach;Phone within Reach   Collaboration Comments regarding outcome of tx session   Session Information   Date / Session Number  2/22- eval only

## 2024-09-22 NOTE — CARE PLAN
The patient is Stable - Low risk of patient condition declining or worsening    Shift Goals  Clinical Goals: Monitor resp status  Patient Goals: Rest  Family Goals: ARAM    Progress made toward(s) clinical / shift goals:    Problem: Skin Integrity  Goal: Skin integrity is maintained or improved  Description: Target End Date:  Prior to discharge or change in level of care    Document interventions on Skin Risk/[place] flowsheet groups and corresponding LDA    1.  Assess and monitor skin integrity, appearance and/or temperature  2.  Assess risk factors for impaired skin integrity and/or pressures ulcers  3.  Implement precautions to protect skin integrity in collaboration with interdisciplinary team  4.  Implement pressure ulcer prevention protocol if at risk for skin breakdown  5.  Confirm wound care consult if at risk for skin breakdown  6.  Ensure patient use of pressure relieving devices  (Low air loss bed, waffle overlay, heel protectors, ROHO cushion, etc)  Outcome: Progressing  Note: Q2h turns done, purewick in place to maintain dry skin, skin care done interdry in place.       Patient is not progressing towards the following goals:      Problem: Impaired Gas Exchange  Goal: Patient will demonstrate improved ventilation and adequate oxygenation and participate in treatment regimen within the level of ability/situation.  Description: Target End Date:  Prior to discharge or change in level of care    1.   Assess/monitor rate/rhythm/depth of effort of respirations  2.   Assess oxygenation as ordered  3.   Administer/titrate oxygen as ordered  4.   Position patient for maximum ventilatory efficiency  5.   Turn, cough, and deep breathe  6.   Vital signs, pulse oximetry  7.   Assess color and body temperature  8.   Assess and monitor breath sounds  9.   Encourage deep-slow or pursed-lip breathing exercises  10. Monitor changes in the level of consciousness and mental status  11. Encourage expectoration of sputum;  airway suctioning  12. Elevate the head of the bed and position patient for maximum ventilatory efficiency  13. Provide a calm, quiet environment  14. Limit patient's activity during the acute phase and have patient resume activity gradually and increase as individually tolerated  15. Evaluate sleep patterns and limit stimulants such as caffeine  16. Collaborate with RT to administer medications/treatments as ordered  Outcome: Not Progressing  Note: Patient on 2ltrs oxygen per NC., not able to tolerate CPAP .

## 2024-09-22 NOTE — CARE PLAN
The patient is Stable - Low risk of patient condition declining or worsening    Shift Goals  Clinical Goals: Monitor resp status, ambulate, VSS, possible discharge  Patient Goals: Rest, go home soon  Family Goals: ARAM      Problem: Knowledge Deficit - COPD  Goal: Patient/significant other demonstrates understanding of disease process, utilization of the Action Plan, medications and discharge instruction  Description: Target End Date:  1-3 days or as soon as patient condition allows    Document in Patient Education    1.  Discuss the importance of medical follow-up care, periodic chest x-rays, sputum cultures  2.  Review worsening signs and symptoms of COPD flare and exacerbation and its management  3.  Discuss respiratory medications, side effects, adverse reactions  4.  Demonstrate technique for using a metered-dose inhaler (MDI) and utilization of a spacer  5.  Review the COPD Action Plan  6.  Instruct and reinforce the rationale for breathing exercises, coughing effectively, and general conditioning exercises  7.  Stress importance of oral care and dental hygiene  8.  Discuss the importance of avoiding people with active respiratory infections and need for routine influenza and pneumococcal vaccinations  9.  Discuss factors that may trigger condition and encourage patient/significant other to explore ways to control these factors in and around the home and work setting  10. Review the harmful effects of smoking and advise cessation of smoking  11. Provide information about activity limitations and alternating activities with rest periods to prevent fatigue  12. Instruct asthmatic patient of use of peak flow meter, as appropriate  13. Review oxygen requirements and dosage, safe use of oxygen, and refer to the supplier as indicated  14. Educate patient/family/caregiver on the use of Nasal Intermittent Positive Pressure Ventilation (NIPPV) and possible adverse reactions  Outcome: Progressing       Progress made  toward(s) clinical / shift goals: patient to be discharged on RA; COPD education discussed prior to discharge with evidence of understanding from patient. Patient prescribed oral prednisone prior to dc      Problem: Pain - Standard  Goal: Alleviation of pain or a reduction in pain to the patient’s comfort goal  Description: Target End Date:  Prior to discharge or change in level of care    Document on Vitals flowsheet    1.  Document pain using the appropriate pain scale per order or unit policy  2.  Educate and implement non-pharmacologic comfort measures (i.e. relaxation, distraction, massage, cold/heat therapy, etc.)  3.  Pain management medications as ordered  4.  Reassess pain after pain med administration per policy  5.  If opiods administered assess patient's response to pain medication is appropriate per POSS sedation scale  6.  Follow pain management plan developed in collaboration with patient and interdisciplinary team (including palliative care or pain specialists if applicable)  Outcome: Progressing       Progress made toward(s) clinical / shift goals: patient used prn tylenol, rest, emotional support, and distraction for comfort measures     Patient is not progressing towards the following goals: NA

## 2024-09-29 ENCOUNTER — HOSPITAL ENCOUNTER (EMERGENCY)
Facility: MEDICAL CENTER | Age: 49
End: 2024-09-29
Attending: EMERGENCY MEDICINE
Payer: MEDICAID

## 2024-09-29 VITALS
DIASTOLIC BLOOD PRESSURE: 70 MMHG | OXYGEN SATURATION: 94 % | WEIGHT: 282.19 LBS | TEMPERATURE: 97.8 F | SYSTOLIC BLOOD PRESSURE: 128 MMHG | BODY MASS INDEX: 53.32 KG/M2 | HEART RATE: 63 BPM | RESPIRATION RATE: 17 BRPM

## 2024-09-29 DIAGNOSIS — Z99.89 USES WALKER: ICD-10-CM

## 2024-09-29 DIAGNOSIS — R26.9 GAIT DIFFICULTY: Primary | ICD-10-CM

## 2024-09-29 DIAGNOSIS — R60.0 LOWER EXTREMITY EDEMA: ICD-10-CM

## 2024-09-29 DIAGNOSIS — J98.4 RESTRICTIVE LUNG DISEASE SECONDARY TO OBESITY: ICD-10-CM

## 2024-09-29 DIAGNOSIS — E66.9 RESTRICTIVE LUNG DISEASE SECONDARY TO OBESITY: ICD-10-CM

## 2024-09-29 LAB
ANION GAP SERPL CALC-SCNC: 11 MMOL/L (ref 7–16)
BASOPHILS # BLD AUTO: 0.1 % (ref 0–1.8)
BASOPHILS # BLD: 0.02 K/UL (ref 0–0.12)
BUN SERPL-MCNC: 16 MG/DL (ref 8–22)
CALCIUM SERPL-MCNC: 9.9 MG/DL (ref 8.5–10.5)
CHLORIDE SERPL-SCNC: 105 MMOL/L (ref 96–112)
CO2 SERPL-SCNC: 25 MMOL/L (ref 20–33)
CREAT SERPL-MCNC: 0.94 MG/DL (ref 0.5–1.4)
EOSINOPHIL # BLD AUTO: 0.17 K/UL (ref 0–0.51)
EOSINOPHIL NFR BLD: 1.2 % (ref 0–6.9)
ERYTHROCYTE [DISTWIDTH] IN BLOOD BY AUTOMATED COUNT: 50.5 FL (ref 35.9–50)
GFR SERPLBLD CREATININE-BSD FMLA CKD-EPI: 73 ML/MIN/1.73 M 2
GLUCOSE SERPL-MCNC: 91 MG/DL (ref 65–99)
HCT VFR BLD AUTO: 41.9 % (ref 37–47)
HGB BLD-MCNC: 12.6 G/DL (ref 12–16)
IMM GRANULOCYTES # BLD AUTO: 0.07 K/UL (ref 0–0.11)
IMM GRANULOCYTES NFR BLD AUTO: 0.5 % (ref 0–0.9)
LYMPHOCYTES # BLD AUTO: 2.37 K/UL (ref 1–4.8)
LYMPHOCYTES NFR BLD: 16.1 % (ref 22–41)
MCH RBC QN AUTO: 26.2 PG (ref 27–33)
MCHC RBC AUTO-ENTMCNC: 30.1 G/DL (ref 32.2–35.5)
MCV RBC AUTO: 87.1 FL (ref 81.4–97.8)
MONOCYTES # BLD AUTO: 0.66 K/UL (ref 0–0.85)
MONOCYTES NFR BLD AUTO: 4.5 % (ref 0–13.4)
NEUTROPHILS # BLD AUTO: 11.4 K/UL (ref 1.82–7.42)
NEUTROPHILS NFR BLD: 77.6 % (ref 44–72)
NRBC # BLD AUTO: 0 K/UL
NRBC BLD-RTO: 0 /100 WBC (ref 0–0.2)
PLATELET # BLD AUTO: 255 K/UL (ref 164–446)
PMV BLD AUTO: 10.9 FL (ref 9–12.9)
POTASSIUM SERPL-SCNC: 4.3 MMOL/L (ref 3.6–5.5)
RBC # BLD AUTO: 4.81 M/UL (ref 4.2–5.4)
SODIUM SERPL-SCNC: 141 MMOL/L (ref 135–145)
WBC # BLD AUTO: 14.7 K/UL (ref 4.8–10.8)

## 2024-09-29 PROCEDURE — 85025 COMPLETE CBC W/AUTO DIFF WBC: CPT

## 2024-09-29 PROCEDURE — 36415 COLL VENOUS BLD VENIPUNCTURE: CPT

## 2024-09-29 PROCEDURE — 80048 BASIC METABOLIC PNL TOTAL CA: CPT

## 2024-09-29 PROCEDURE — 99284 EMERGENCY DEPT VISIT MOD MDM: CPT

## 2024-09-29 ASSESSMENT — PAIN DESCRIPTION - PAIN TYPE
TYPE: ACUTE PAIN
TYPE: ACUTE PAIN

## 2024-09-29 ASSESSMENT — FIBROSIS 4 INDEX: FIB4 SCORE: 0.71

## 2024-09-29 NOTE — ED NOTES
Patient Identifiers verified; wheeled to room via Wheel chair; patient settled into [facility] with mild assistance from staff required; connected to BP and Pulse Ox monitoring; charted the patient for Emergency Room Physician to see.

## 2024-09-29 NOTE — FACE TO FACE
Face to Face Note  -  Durable Medical Equipment    David Kong M.D. - NPI: [id number]  I certify that this patient is under my care and that they had a durable medical equipment(DME)face to face encounter by myself that meets the physician DME face-to-face encounter requirements with this patient on:    Date of encounter:   Patient:                    MRN:                       YOB: 2026  Lorene Haro  [id number]  1975     The encounter with the patient was in whole, or in part, for the following medical condition, which is the primary reason for durable medical equipment:  Other - chronic debility, leg weakness, COPD    I certify that, based on my findings, the following durable medical equipment is medically necessary:    Walkers.    My Clinical findings support the need for the above equipment due to:  Abnormal Gait, Mental Status

## 2024-09-29 NOTE — ED NOTES
Patient able to ambulate with use of walker, patient does state that her pain is decreased when using the walker and would feel comfortable going home with a walker.

## 2024-09-29 NOTE — ED NOTES
Pt stays at a group home called [facility] at [address] [place] [address] in [place].

## 2024-09-29 NOTE — ED TRIAGE NOTES
TESS WHITE to triage  Chief Complaint   Patient presents with    Extremity Weakness     New leg weakness, normally walks with a walker, it broke a month ago, but today was not able to stand on her own.     Leg Pain     C/o of L leg pain that started today, denies any recent trauma     Pt lives at a group home in [place]. Took a bus to play [place], but when she was done, pt said she had L leg pain, and lower extremity weakness and could not walk. Felt like she was going to fall, but didn't.

## 2024-09-29 NOTE — ED PROVIDER NOTES
ER Provider Note    David Kong M.D.. 9/29/2024  3:47 PM    Primary Care Provider: Lili Reddy M.D.    CHIEF COMPLAINT  Chief Complaint   Patient presents with    Extremity Weakness     New leg weakness, normally walks with a walker, it broke a month ago, but today was not able to stand on her own.     Leg Pain     C/o of L leg pain that started today, denies any recent trauma     EXTERNAL RECORDS REVIEWED  Inpatient records show she was recently admitted with a COPD exacerbation.  No complaints of shortness of breath today.    HPI/ROS  LIMITATION TO HISTORY   History of well-controlled bipolar disorder and schizophrenia, mild cognitive limitations.    OUTSIDE HISTORIAN(S):  EMS contributes to history on arrival.    Lorene Haro is a 49 y.o. female who presents to the ED complaining of bilateral leg weakness, left greater than right, which she says is because she has not had a walker in several weeks.  She normally uses a walker, but her front wheel stopped working few weeks ago.  She says that today, she was able to take the access bus to [place], but after sitting for a long time during bingo, had a hard time getting around on her own.  This left leg pain is not a new problem.  She    PAST MEDICAL HISTORY  Past Medical History:   Diagnosis Date    A-fib (HCC)     MONI (acute kidney injury) (HCC) 8/9/2023    Asthma     Bipolar 2 disorder (HCC)     Chickenpox     Chronic obstructive pulmonary disease (HCC)     Hypertension     On home oxygen therapy     Other psychological stress     Schizophrenic disorder (HCC)     Yeast infection of the skin 04/01/2021       SURGICAL HISTORY  Past Surgical History:   Procedure Laterality Date    CHOLECYSTECTOMY      COLECTOMY      HYSTERECTOMY LAPAROSCOPY      KNEE ARTHROSCOPY Left        FAMILY HISTORY  Family History   Problem Relation Age of Onset    No Known Problems Mother     Cancer Sister        SOCIAL HISTORY   reports that she quit smoking about 1 years ago.  Her smoking use included cigarettes. She has never used smokeless tobacco. She reports that she does not currently use alcohol. She reports that she does not use drugs.    CURRENT MEDICATIONS  Discharge Medication List as of 9/29/2024  5:28 PM        CONTINUE these medications which have NOT CHANGED    Details   Thiamine HCl (VITAMIN B1) 100 MG Tab Historical Med      hydrOXYzine HCl (ATARAX) 25 MG Tab Historical Med      lidocaine (LMX) 4 % Cream 1 application as needed Externally Three times a day for 30 days, Historical Med      nicotine (NICODERM) 7 MG/24HR PATCH 24 HR Historical Med      naproxen (NAPROSYN) 500 MG Tab Take 1 Tablet by mouth 2 times a day with meals., Disp-60 Tablet, R-0, Normal      ondansetron (ZOFRAN ODT) 4 MG TABLET DISPERSIBLE Dissolve 1 Tablet by mouth every 6 hours as needed for Nausea/Vomiting., Disp-10 Tablet, R-0, Normal      meclizine (ANTIVERT) 25 MG Tab Take 1 Tablet by mouth 3 times a day as needed for Vertigo or Dizziness., Disp-30 Tablet, R-0, Normal      lidocaine (LIDODERM) 5 % Patch Place 1 Patch on the skin every 12 hours., Historical Med      omeprazole (PRILOSEC) 20 MG delayed-release capsule Take 1 Capsule by mouth 2 times a day., Disp-60 Capsule, R-0, Normal      nicotine (NICODERM) 14 MG/24HR PATCH 24 HR Place 1 Patch on the skin every 24 hours., Historical Med      gabapentin (NEURONTIN) 300 MG Cap Take 300 mg by mouth 3 times a day., Historical Med      Home Care Oxygen Inhale 2 L/min every evening. DME = Preferred, Historical Med      ADVAIR DISKUS 100-50 MCG/ACT AEROSOL POWDER, BREATH ACTIVATED Inhale 1 Puff 2 times a day.3 month supply x 1 yearDisp-180 Each, R-3, SHERRI, Normal      montelukast (SINGULAIR) 10 MG Tab Take 1 Tablet by mouth every evening.3 month supply x 1 yearDisp-90 Tablet, R-3, Normal      metoprolol SR (TOPROL XL) 25 MG TABLET SR 24 HR Take 25 mg by mouth every day., Historical Med      apixaban (ELIQUIS) 5mg Tab Take 1 Tablet by mouth 2 times a  day., Disp-200 Tablet, R-3, Normal      fluticasone (FLONASE) 50 MCG/ACT nasal spray Administer 2 Sprays into each nostril every morning., Historical Med      aripiprazole (ABILIFY) 30 MG tablet Take 1 Tablet by mouth every morning. Indications: Major Depressive Disorder, Disp-30 Tablet, R-0, Print Rx Paper      atorvastatin (LIPITOR) 20 MG Tab Take 1 Tablet by mouth at bedtime., Disp-30 Tablet, R-0, Print Rx Paper             ALLERGIES  Penicillin g and Amoxicillin    PHYSICAL EXAM  VITAL SIGNS: /70   Pulse 63   Temp 36.6 °C (97.8 °F) (Temporal)   Resp 17   Wt (!) 128 kg (282 lb 3 oz)   LMP  (LMP Unknown)   SpO2 94%   BMI 53.32 kg/m²   Pulse ox interpretation: I interpret this pulse ox as normal.  Constitutional: Alert in no apparent distress.  HENT: No signs of trauma, Bilateral external ears normal, Nose normal.   Eyes: Pupils are equal and reactive, Conjunctiva normal, Non-icteric.   Neck: Normal range of motion, Supple, No stridor.    Cardiovascular: Normal peripheral perfusion  Thorax & Lungs: Unlabored respirations, equal chest expansion, no accessory muscle use  Abdomen: Non-distended  Skin:  No erythema, No rash.   Back: Normal alignment and ROM  Extremities: No gross deformity  Musculoskeletal: Good range of motion in all major joints.   Neurologic: Alert, Normal motor function, No focal deficits noted.   Psychiatric: Affect normal, Judgment normal, Mood normal.      DIAGNOSTIC STUDIES    Labs:   Labs Reviewed   CBC WITH DIFFERENTIAL - Abnormal; Notable for the following components:       Result Value    WBC 14.7 (*)     MCH 26.2 (*)     MCHC 30.1 (*)     RDW 50.5 (*)     Neutrophils-Polys 77.60 (*)     Lymphocytes 16.10 (*)     Neutrophils (Absolute) 11.40 (*)     All other components within normal limits   BASIC METABOLIC PANEL   ESTIMATED GFR       EKG:   I have independently interpreted this EKG  Results for orders placed or performed during the hospital encounter of 09/20/24   EKG    Result Value Ref Range    Report       [facility] Emergency Dept.    Test Date:  2024  Pt Name:    ADELINA MILLAN                Department: ER  MRN:        [id number]                      Room:  Gender:     Female                       Technician: [id number]  :        1975                   Requested By:ER TRIAGE PROTOCOL  Order #:    [id number]                    Reading MD:    Measurements  Intervals                                Axis  Rate:       50                           P:          47  NE:         132                          QRS:        52  QRSD:       112                          T:          4  QT:         407  QTc:        372    Interpretive Statements  Sinus bradycardia  Incomplete right bundle branch block  Low voltage, precordial leads  Compared to ECG 09/10/2024 13:18:28  Incomplete right bundle-branch block now present  Low QRS voltage now present  Sinus rhythm no longer present  Atrial premature complex(es) no longer present       *Note: Due to a large number of results and/or encounters for the requested time period, some results have not been displayed. A complete set of results can be found in Results Review.         COURSE & MEDICAL DECISION MAKING     INITIAL ASSESSMENT, COURSE AND PLAN  Care Narrative:       3:47 PM Patient presents to the ED with difficulty walking without her walker, in the setting of chronic left leg pain, undergoing physical therapy, able to leave her house today without her walker, but was feeling unsteady on her feet after a period of inactivity, feels unsafe trying to go home without a walker.  No signs of infection to her left leg.  She says this leg is uncomfortable for her to walk with when she does not have a walker, which is why she uses the walker in the first place, and I think this is by far the most likely source of her pain rather than infection or DVT.  No history or evidence of trauma, no complaints of trauma.  No reason to  suspect DVT/PE based on history and physical.  She was able to demonstrate a stable independent gait with a walker, and reported a lot of relief, feeling back to normal.  I think that dehydration or electrolyte abnormality as contributors is very unlikely, but worth checking.  I will speak to social work about whether or not we have a walker to dispense.  The patient does have a replacement coming from care [facility].    4:13 PM social work is able to dispense a walker, and I have placed an associated DME order.  Labs are pending.    5:30 PM patient has a nonspecific leukocytosis, no complaint of fevers, no evidence of infection on clinical exam, no abnormal vital signs.  She has been resting comfortably without complaints, has ambulated with her walker without any difficulty, happily, without complaint.  She is happy to be discharged home.  She has a replacement walker coming from care [facility], she says, and we did request that if possible, she returned this alone walker to [facility].  Discharged in improved condition.      DISPOSITION AND DISCUSSIONS    Discussion of management with other [facility] or appropriate source(s): [place], ED , for assistance with DME.    Escalation of care considered, and ultimately not performed: diagnostic imaging considered, but I think the patient's left leg pain is secondary to ambulation without a walker, which is consistent with her report of her symptoms and history.    Decision tools and prescription drugs considered including, but not limited to: Pain Medications considered, but the patient is now ambulatory, pain-free, with a walker. .     The patient will return for new or worsening symptoms and is stable at the time of discharge.    The patient is referred to a primary physician for blood pressure management, diabetic screening, and for all other preventative health concerns.      DISPOSITION:  Patient will be discharged home in stable condition.    FOLLOW UP:  Lili PICKETT  AJ Reddy  [address]  [address]  [place] [address]  [phone]      As needed      OUTPATIENT MEDICATIONS:  Discharge Medication List as of 9/29/2024  5:28 PM            FINAL DIAGNOSIS  1. Gait difficulty    2. Lower extremity edema    3. Restrictive lung disease secondary to obesity    4. Uses walker          David HARRIS M.D. (Scribe), am scribing for, and in the presence of, David Kong M.D..    Electronically signed by: David Kong M.D. (Scribe), 9/29/2024    David HARRIS M.D. personally performed the services described in this documentation, as scribed by David Kong M.D. in my presence, and it is both accurate and complete.

## 2024-09-30 NOTE — DISCHARGE INSTRUCTIONS
Your tests here were reassuring.  Please return for severe leg pain if you are getting worse instead of better.  Continue your physical therapy.  I would very much appreciate it if you would return the walker we have loaned you.  This is meant to be borrowed, please return it if possible.

## 2024-09-30 NOTE — ED NOTES
Discharge education provided by ERP. Discharge paperwork reviewed with patient. Patient provided with sandwiches and juice per request. All questions answered. All belongings with patient. Patient ambulated to lobby with use of provided walker with steady gait, patient understands the need to have the walker returned to [facility] once she is able to do so due to her expecting one to be delivered this upcoming week.

## 2024-10-03 ENCOUNTER — HOSPITAL ENCOUNTER (OUTPATIENT)
Dept: LAB | Facility: MEDICAL CENTER | Age: 49
End: 2024-10-03
Attending: STUDENT IN AN ORGANIZED HEALTH CARE EDUCATION/TRAINING PROGRAM
Payer: MEDICAID

## 2024-10-03 ENCOUNTER — NON-PROVIDER VISIT (OUTPATIENT)
Dept: CARDIOLOGY | Facility: MEDICAL CENTER | Age: 49
End: 2024-10-03
Payer: MEDICAID

## 2024-10-03 LAB
ANISOCYTOSIS BLD QL SMEAR: NORMAL
ERYTHROCYTE [DISTWIDTH] IN BLOOD BY AUTOMATED COUNT: 51.2 FL (ref 35.9–50)
EST. AVERAGE GLUCOSE BLD GHB EST-MCNC: 128 MG/DL
HBA1C MFR BLD: 6.1 % (ref 4–5.6)
HCT VFR BLD AUTO: 41.5 % (ref 37–47)
HGB BLD-MCNC: 12.3 G/DL (ref 12–16)
MCH RBC QN AUTO: 26.1 PG (ref 27–33)
MCHC RBC AUTO-ENTMCNC: 29.6 G/DL (ref 32.2–35.5)
MCV RBC AUTO: 88.1 FL (ref 81.4–97.8)
MICROCYTES BLD QL SMEAR: NORMAL
MORPHOLOGY BLD-IMP: NORMAL
OVALOCYTES BLD QL SMEAR: NORMAL
PLATELET # BLD AUTO: 255 K/UL (ref 164–446)
PLATELET BLD QL SMEAR: NORMAL
PMV BLD AUTO: 12.4 FL (ref 9–12.9)
POIKILOCYTOSIS BLD QL SMEAR: NORMAL
RBC # BLD AUTO: 4.71 M/UL (ref 4.2–5.4)
RBC BLD AUTO: PRESENT
WBC # BLD AUTO: 10.3 K/UL (ref 4.8–10.8)

## 2024-10-03 PROCEDURE — 82728 ASSAY OF FERRITIN: CPT

## 2024-10-03 PROCEDURE — 80053 COMPREHEN METABOLIC PANEL: CPT

## 2024-10-03 PROCEDURE — 85027 COMPLETE CBC AUTOMATED: CPT

## 2024-10-03 PROCEDURE — 86803 HEPATITIS C AB TEST: CPT

## 2024-10-03 PROCEDURE — 83036 HEMOGLOBIN GLYCOSYLATED A1C: CPT

## 2024-10-03 PROCEDURE — 83540 ASSAY OF IRON: CPT

## 2024-10-03 PROCEDURE — 36415 COLL VENOUS BLD VENIPUNCTURE: CPT

## 2024-10-03 PROCEDURE — 82977 ASSAY OF GGT: CPT

## 2024-10-03 PROCEDURE — 83550 IRON BINDING TEST: CPT

## 2024-10-04 LAB
ALBUMIN SERPL BCP-MCNC: 4 G/DL (ref 3.2–4.9)
ALBUMIN/GLOB SERPL: 1.3 G/DL
ALP SERPL-CCNC: 135 U/L (ref 30–99)
ALT SERPL-CCNC: 13 U/L (ref 2–50)
ANION GAP SERPL CALC-SCNC: 11 MMOL/L (ref 7–16)
AST SERPL-CCNC: 20 U/L (ref 12–45)
BILIRUB SERPL-MCNC: 0.7 MG/DL (ref 0.1–1.5)
BUN SERPL-MCNC: 15 MG/DL (ref 8–22)
CALCIUM ALBUM COR SERPL-MCNC: 9.2 MG/DL (ref 8.5–10.5)
CALCIUM SERPL-MCNC: 9.2 MG/DL (ref 8.5–10.5)
CHLORIDE SERPL-SCNC: 106 MMOL/L (ref 96–112)
CO2 SERPL-SCNC: 25 MMOL/L (ref 20–33)
CREAT SERPL-MCNC: 0.84 MG/DL (ref 0.5–1.4)
FERRITIN SERPL-MCNC: 65.3 NG/ML (ref 10–291)
GFR SERPLBLD CREATININE-BSD FMLA CKD-EPI: 84 ML/MIN/1.73 M 2
GGT SERPL-CCNC: 35 U/L (ref 7–34)
GLOBULIN SER CALC-MCNC: 3 G/DL (ref 1.9–3.5)
GLUCOSE SERPL-MCNC: 91 MG/DL (ref 65–99)
HCV AB SER QL: NORMAL
IRON SATN MFR SERPL: 11 % (ref 15–55)
IRON SERPL-MCNC: 35 UG/DL (ref 40–170)
POTASSIUM SERPL-SCNC: 4.7 MMOL/L (ref 3.6–5.5)
PROT SERPL-MCNC: 7 G/DL (ref 6–8.2)
SODIUM SERPL-SCNC: 142 MMOL/L (ref 135–145)
TIBC SERPL-MCNC: 308 UG/DL (ref 250–450)
UIBC SERPL-MCNC: 273 UG/DL (ref 110–370)

## 2024-10-04 PROCEDURE — 93298 REM INTERROG DEV EVAL SCRMS: CPT | Mod: 26 | Performed by: STUDENT IN AN ORGANIZED HEALTH CARE EDUCATION/TRAINING PROGRAM

## 2024-10-07 ENCOUNTER — APPOINTMENT (OUTPATIENT)
Dept: RADIOLOGY | Facility: MEDICAL CENTER | Age: 49
End: 2024-10-07
Attending: EMERGENCY MEDICINE
Payer: MEDICAID

## 2024-10-07 ENCOUNTER — HOSPITAL ENCOUNTER (EMERGENCY)
Facility: MEDICAL CENTER | Age: 49
End: 2024-10-08
Attending: EMERGENCY MEDICINE
Payer: MEDICAID

## 2024-10-07 DIAGNOSIS — G89.29 ACUTE EXACERBATION OF CHRONIC LOW BACK PAIN: ICD-10-CM

## 2024-10-07 DIAGNOSIS — M54.17 LUMBOSACRAL RADICULOPATHY: ICD-10-CM

## 2024-10-07 DIAGNOSIS — M54.50 ACUTE EXACERBATION OF CHRONIC LOW BACK PAIN: ICD-10-CM

## 2024-10-07 PROCEDURE — A9270 NON-COVERED ITEM OR SERVICE: HCPCS | Mod: UD | Performed by: EMERGENCY MEDICINE

## 2024-10-07 PROCEDURE — 73590 X-RAY EXAM OF LOWER LEG: CPT | Mod: LT

## 2024-10-07 PROCEDURE — 99284 EMERGENCY DEPT VISIT MOD MDM: CPT

## 2024-10-07 PROCEDURE — 700102 HCHG RX REV CODE 250 W/ 637 OVERRIDE(OP): Mod: UD | Performed by: EMERGENCY MEDICINE

## 2024-10-07 RX ORDER — IBUPROFEN 600 MG/1
600 TABLET, FILM COATED ORAL ONCE
Status: COMPLETED | OUTPATIENT
Start: 2024-10-07 | End: 2024-10-07

## 2024-10-07 RX ORDER — DEXAMETHASONE 4 MG/1
12 TABLET ORAL ONCE
Status: COMPLETED | OUTPATIENT
Start: 2024-10-08 | End: 2024-10-07

## 2024-10-07 RX ORDER — HYDROCODONE BITARTRATE AND ACETAMINOPHEN 5; 325 MG/1; MG/1
1 TABLET ORAL ONCE
Status: COMPLETED | OUTPATIENT
Start: 2024-10-07 | End: 2024-10-07

## 2024-10-07 RX ADMIN — IBUPROFEN 600 MG: 600 TABLET, FILM COATED ORAL at 23:19

## 2024-10-07 RX ADMIN — HYDROCODONE BITARTRATE AND ACETAMINOPHEN 1 TABLET: 5; 325 TABLET ORAL at 22:27

## 2024-10-07 RX ADMIN — DEXAMETHASONE 12 MG: 4 TABLET ORAL at 23:53

## 2024-10-07 ASSESSMENT — FIBROSIS 4 INDEX: FIB4 SCORE: 1.109400392450458244

## 2024-10-08 VITALS
HEART RATE: 62 BPM | TEMPERATURE: 97.4 F | WEIGHT: 278 LBS | OXYGEN SATURATION: 96 % | BODY MASS INDEX: 52.49 KG/M2 | SYSTOLIC BLOOD PRESSURE: 107 MMHG | RESPIRATION RATE: 16 BRPM | HEIGHT: 61 IN | DIASTOLIC BLOOD PRESSURE: 59 MMHG

## 2024-10-13 ENCOUNTER — HOSPITAL ENCOUNTER (EMERGENCY)
Facility: MEDICAL CENTER | Age: 49
End: 2024-10-13
Attending: EMERGENCY MEDICINE
Payer: MEDICAID

## 2024-10-13 ENCOUNTER — APPOINTMENT (OUTPATIENT)
Dept: RADIOLOGY | Facility: MEDICAL CENTER | Age: 49
End: 2024-10-13
Attending: EMERGENCY MEDICINE
Payer: MEDICAID

## 2024-10-13 VITALS
BODY MASS INDEX: 52.49 KG/M2 | OXYGEN SATURATION: 92 % | HEIGHT: 61 IN | WEIGHT: 278 LBS | HEART RATE: 58 BPM | SYSTOLIC BLOOD PRESSURE: 122 MMHG | TEMPERATURE: 98.2 F | DIASTOLIC BLOOD PRESSURE: 55 MMHG | RESPIRATION RATE: 18 BRPM

## 2024-10-13 DIAGNOSIS — M25.532 LEFT WRIST PAIN: ICD-10-CM

## 2024-10-13 LAB
ALBUMIN SERPL BCP-MCNC: 3.7 G/DL (ref 3.2–4.9)
ALBUMIN/GLOB SERPL: 1.5 G/DL
ALP SERPL-CCNC: 132 U/L (ref 30–99)
ALT SERPL-CCNC: 16 U/L (ref 2–50)
ANION GAP SERPL CALC-SCNC: 9 MMOL/L (ref 7–16)
AST SERPL-CCNC: 10 U/L (ref 12–45)
BASOPHILS # BLD AUTO: 0.2 % (ref 0–1.8)
BASOPHILS # BLD: 0.03 K/UL (ref 0–0.12)
BILIRUB SERPL-MCNC: 0.4 MG/DL (ref 0.1–1.5)
BUN SERPL-MCNC: 19 MG/DL (ref 8–22)
CALCIUM ALBUM COR SERPL-MCNC: 9.6 MG/DL (ref 8.5–10.5)
CALCIUM SERPL-MCNC: 9.4 MG/DL (ref 8.5–10.5)
CHLORIDE SERPL-SCNC: 109 MMOL/L (ref 96–112)
CO2 SERPL-SCNC: 25 MMOL/L (ref 20–33)
CREAT SERPL-MCNC: 0.79 MG/DL (ref 0.5–1.4)
EKG IMPRESSION: NORMAL
EOSINOPHIL # BLD AUTO: 0.2 K/UL (ref 0–0.51)
EOSINOPHIL NFR BLD: 1.6 % (ref 0–6.9)
ERYTHROCYTE [DISTWIDTH] IN BLOOD BY AUTOMATED COUNT: 52.1 FL (ref 35.9–50)
GFR SERPLBLD CREATININE-BSD FMLA CKD-EPI: 90 ML/MIN/1.73 M 2
GLOBULIN SER CALC-MCNC: 2.5 G/DL (ref 1.9–3.5)
GLUCOSE SERPL-MCNC: 105 MG/DL (ref 65–99)
HCT VFR BLD AUTO: 36.9 % (ref 37–47)
HGB BLD-MCNC: 11.3 G/DL (ref 12–16)
IMM GRANULOCYTES # BLD AUTO: 0.03 K/UL (ref 0–0.11)
IMM GRANULOCYTES NFR BLD AUTO: 0.2 % (ref 0–0.9)
LYMPHOCYTES # BLD AUTO: 1.85 K/UL (ref 1–4.8)
LYMPHOCYTES NFR BLD: 14.6 % (ref 22–41)
MCH RBC QN AUTO: 26.8 PG (ref 27–33)
MCHC RBC AUTO-ENTMCNC: 30.6 G/DL (ref 32.2–35.5)
MCV RBC AUTO: 87.6 FL (ref 81.4–97.8)
MONOCYTES # BLD AUTO: 0.64 K/UL (ref 0–0.85)
MONOCYTES NFR BLD AUTO: 5 % (ref 0–13.4)
NEUTROPHILS # BLD AUTO: 9.95 K/UL (ref 1.82–7.42)
NEUTROPHILS NFR BLD: 78.4 % (ref 44–72)
NRBC # BLD AUTO: 0 K/UL
NRBC BLD-RTO: 0 /100 WBC (ref 0–0.2)
NT-PROBNP SERPL IA-MCNC: 437 PG/ML (ref 0–125)
PLATELET # BLD AUTO: 205 K/UL (ref 164–446)
PMV BLD AUTO: 11.3 FL (ref 9–12.9)
POTASSIUM SERPL-SCNC: 4.2 MMOL/L (ref 3.6–5.5)
PROT SERPL-MCNC: 6.2 G/DL (ref 6–8.2)
RBC # BLD AUTO: 4.21 M/UL (ref 4.2–5.4)
SODIUM SERPL-SCNC: 143 MMOL/L (ref 135–145)
TROPONIN T SERPL-MCNC: 42 NG/L (ref 6–19)
TROPONIN T SERPL-MCNC: 43 NG/L (ref 6–19)
WBC # BLD AUTO: 12.7 K/UL (ref 4.8–10.8)

## 2024-10-13 PROCEDURE — 700102 HCHG RX REV CODE 250 W/ 637 OVERRIDE(OP): Mod: UD | Performed by: EMERGENCY MEDICINE

## 2024-10-13 PROCEDURE — 93005 ELECTROCARDIOGRAM TRACING: CPT | Performed by: EMERGENCY MEDICINE

## 2024-10-13 PROCEDURE — 85025 COMPLETE CBC W/AUTO DIFF WBC: CPT

## 2024-10-13 PROCEDURE — 93005 ELECTROCARDIOGRAM TRACING: CPT

## 2024-10-13 PROCEDURE — 83880 ASSAY OF NATRIURETIC PEPTIDE: CPT

## 2024-10-13 PROCEDURE — 36415 COLL VENOUS BLD VENIPUNCTURE: CPT

## 2024-10-13 PROCEDURE — 84484 ASSAY OF TROPONIN QUANT: CPT

## 2024-10-13 PROCEDURE — A9270 NON-COVERED ITEM OR SERVICE: HCPCS | Mod: UD | Performed by: EMERGENCY MEDICINE

## 2024-10-13 PROCEDURE — 71045 X-RAY EXAM CHEST 1 VIEW: CPT

## 2024-10-13 PROCEDURE — 99284 EMERGENCY DEPT VISIT MOD MDM: CPT

## 2024-10-13 PROCEDURE — 80053 COMPREHEN METABOLIC PANEL: CPT

## 2024-10-13 RX ORDER — IBUPROFEN 600 MG/1
600 TABLET, FILM COATED ORAL ONCE
Status: COMPLETED | OUTPATIENT
Start: 2024-10-13 | End: 2024-10-13

## 2024-10-13 RX ORDER — ACETAMINOPHEN 500 MG
1000 TABLET ORAL ONCE
Status: COMPLETED | OUTPATIENT
Start: 2024-10-13 | End: 2024-10-13

## 2024-10-13 RX ADMIN — ACETAMINOPHEN 1000 MG: 500 TABLET ORAL at 18:55

## 2024-10-13 RX ADMIN — IBUPROFEN 600 MG: 600 TABLET, FILM COATED ORAL at 18:55

## 2024-10-13 ASSESSMENT — FIBROSIS 4 INDEX: FIB4 SCORE: 1.109400392450458244

## 2024-10-13 ASSESSMENT — PAIN DESCRIPTION - PAIN TYPE: TYPE: ACUTE PAIN

## 2024-10-16 ENCOUNTER — PHARMACY VISIT (OUTPATIENT)
Dept: PHARMACY | Facility: MEDICAL CENTER | Age: 49
End: 2024-10-16
Payer: COMMERCIAL

## 2024-10-16 ENCOUNTER — APPOINTMENT (OUTPATIENT)
Dept: RADIOLOGY | Facility: MEDICAL CENTER | Age: 49
End: 2024-10-16
Attending: STUDENT IN AN ORGANIZED HEALTH CARE EDUCATION/TRAINING PROGRAM
Payer: MEDICAID

## 2024-10-16 ENCOUNTER — HOSPITAL ENCOUNTER (EMERGENCY)
Facility: MEDICAL CENTER | Age: 49
End: 2024-10-16
Attending: STUDENT IN AN ORGANIZED HEALTH CARE EDUCATION/TRAINING PROGRAM
Payer: MEDICAID

## 2024-10-16 VITALS
HEIGHT: 61 IN | SYSTOLIC BLOOD PRESSURE: 100 MMHG | OXYGEN SATURATION: 92 % | WEIGHT: 278 LBS | HEART RATE: 71 BPM | DIASTOLIC BLOOD PRESSURE: 48 MMHG | TEMPERATURE: 96.8 F | RESPIRATION RATE: 16 BRPM | BODY MASS INDEX: 52.49 KG/M2

## 2024-10-16 DIAGNOSIS — J18.9 COMMUNITY ACQUIRED PNEUMONIA OF LEFT LOWER LOBE OF LUNG: ICD-10-CM

## 2024-10-16 DIAGNOSIS — B37.2 CANDIDAL SKIN INFECTION: ICD-10-CM

## 2024-10-16 DIAGNOSIS — R07.9 CHEST PAIN, UNSPECIFIED TYPE: ICD-10-CM

## 2024-10-16 LAB
ALBUMIN SERPL BCP-MCNC: 3.8 G/DL (ref 3.2–4.9)
ALBUMIN/GLOB SERPL: 1.4 G/DL
ALP SERPL-CCNC: 137 U/L (ref 30–99)
ALT SERPL-CCNC: 15 U/L (ref 2–50)
ANION GAP SERPL CALC-SCNC: 10 MMOL/L (ref 7–16)
AST SERPL-CCNC: 11 U/L (ref 12–45)
BASOPHILS # BLD AUTO: 0.1 % (ref 0–1.8)
BASOPHILS # BLD: 0.01 K/UL (ref 0–0.12)
BILIRUB SERPL-MCNC: 0.4 MG/DL (ref 0.1–1.5)
BUN SERPL-MCNC: 18 MG/DL (ref 8–22)
CALCIUM ALBUM COR SERPL-MCNC: 9.9 MG/DL (ref 8.5–10.5)
CALCIUM SERPL-MCNC: 9.7 MG/DL (ref 8.5–10.5)
CHLORIDE SERPL-SCNC: 106 MMOL/L (ref 96–112)
CO2 SERPL-SCNC: 24 MMOL/L (ref 20–33)
CREAT SERPL-MCNC: 0.88 MG/DL (ref 0.5–1.4)
EKG IMPRESSION: NORMAL
EOSINOPHIL # BLD AUTO: 0.14 K/UL (ref 0–0.51)
EOSINOPHIL NFR BLD: 1.5 % (ref 0–6.9)
ERYTHROCYTE [DISTWIDTH] IN BLOOD BY AUTOMATED COUNT: 51.8 FL (ref 35.9–50)
GFR SERPLBLD CREATININE-BSD FMLA CKD-EPI: 79 ML/MIN/1.73 M 2
GLOBULIN SER CALC-MCNC: 2.7 G/DL (ref 1.9–3.5)
GLUCOSE SERPL-MCNC: 121 MG/DL (ref 65–99)
HCT VFR BLD AUTO: 36.4 % (ref 37–47)
HGB BLD-MCNC: 11.3 G/DL (ref 12–16)
IMM GRANULOCYTES # BLD AUTO: 0.03 K/UL (ref 0–0.11)
IMM GRANULOCYTES NFR BLD AUTO: 0.3 % (ref 0–0.9)
LYMPHOCYTES # BLD AUTO: 1.99 K/UL (ref 1–4.8)
LYMPHOCYTES NFR BLD: 21.1 % (ref 22–41)
MCH RBC QN AUTO: 26.6 PG (ref 27–33)
MCHC RBC AUTO-ENTMCNC: 31 G/DL (ref 32.2–35.5)
MCV RBC AUTO: 85.6 FL (ref 81.4–97.8)
MONOCYTES # BLD AUTO: 0.53 K/UL (ref 0–0.85)
MONOCYTES NFR BLD AUTO: 5.6 % (ref 0–13.4)
NEUTROPHILS # BLD AUTO: 6.73 K/UL (ref 1.82–7.42)
NEUTROPHILS NFR BLD: 71.4 % (ref 44–72)
NRBC # BLD AUTO: 0 K/UL
NRBC BLD-RTO: 0 /100 WBC (ref 0–0.2)
NT-PROBNP SERPL IA-MCNC: 323 PG/ML (ref 0–125)
PLATELET # BLD AUTO: 208 K/UL (ref 164–446)
PMV BLD AUTO: 11.7 FL (ref 9–12.9)
POTASSIUM SERPL-SCNC: 4.1 MMOL/L (ref 3.6–5.5)
PROT SERPL-MCNC: 6.5 G/DL (ref 6–8.2)
RBC # BLD AUTO: 4.25 M/UL (ref 4.2–5.4)
SODIUM SERPL-SCNC: 140 MMOL/L (ref 135–145)
TROPONIN T SERPL-MCNC: 34 NG/L (ref 6–19)
WBC # BLD AUTO: 9.4 K/UL (ref 4.8–10.8)

## 2024-10-16 PROCEDURE — 99285 EMERGENCY DEPT VISIT HI MDM: CPT

## 2024-10-16 PROCEDURE — 85025 COMPLETE CBC W/AUTO DIFF WBC: CPT

## 2024-10-16 PROCEDURE — RXMED WILLOW AMBULATORY MEDICATION CHARGE: Performed by: STUDENT IN AN ORGANIZED HEALTH CARE EDUCATION/TRAINING PROGRAM

## 2024-10-16 PROCEDURE — 71045 X-RAY EXAM CHEST 1 VIEW: CPT

## 2024-10-16 PROCEDURE — 93005 ELECTROCARDIOGRAM TRACING: CPT

## 2024-10-16 PROCEDURE — 84484 ASSAY OF TROPONIN QUANT: CPT

## 2024-10-16 PROCEDURE — 700111 HCHG RX REV CODE 636 W/ 250 OVERRIDE (IP): Mod: UD | Performed by: STUDENT IN AN ORGANIZED HEALTH CARE EDUCATION/TRAINING PROGRAM

## 2024-10-16 PROCEDURE — 83880 ASSAY OF NATRIURETIC PEPTIDE: CPT

## 2024-10-16 PROCEDURE — 700102 HCHG RX REV CODE 250 W/ 637 OVERRIDE(OP): Mod: UD | Performed by: STUDENT IN AN ORGANIZED HEALTH CARE EDUCATION/TRAINING PROGRAM

## 2024-10-16 PROCEDURE — A9270 NON-COVERED ITEM OR SERVICE: HCPCS | Mod: UD | Performed by: STUDENT IN AN ORGANIZED HEALTH CARE EDUCATION/TRAINING PROGRAM

## 2024-10-16 PROCEDURE — 36415 COLL VENOUS BLD VENIPUNCTURE: CPT

## 2024-10-16 PROCEDURE — 93005 ELECTROCARDIOGRAM TRACING: CPT | Performed by: STUDENT IN AN ORGANIZED HEALTH CARE EDUCATION/TRAINING PROGRAM

## 2024-10-16 PROCEDURE — 80053 COMPREHEN METABOLIC PANEL: CPT

## 2024-10-16 RX ORDER — NYSTATIN 100000 [USP'U]/G
1 POWDER TOPICAL 3 TIMES DAILY
Qty: 30 G | Refills: 0 | Status: SHIPPED | OUTPATIENT
Start: 2024-10-16 | End: 2024-10-26

## 2024-10-16 RX ORDER — DOXYCYCLINE 100 MG/1
100 CAPSULE ORAL 2 TIMES DAILY
Qty: 10 CAPSULE | Refills: 0 | Status: ACTIVE | OUTPATIENT
Start: 2024-10-16 | End: 2024-10-21

## 2024-10-16 RX ORDER — ONDANSETRON 4 MG/1
4 TABLET, ORALLY DISINTEGRATING ORAL ONCE
Status: COMPLETED | OUTPATIENT
Start: 2024-10-16 | End: 2024-10-16

## 2024-10-16 RX ORDER — DOXYCYCLINE 100 MG/1
100 TABLET ORAL ONCE
Status: COMPLETED | OUTPATIENT
Start: 2024-10-16 | End: 2024-10-16

## 2024-10-16 RX ADMIN — ONDANSETRON 4 MG: 4 TABLET, ORALLY DISINTEGRATING ORAL at 21:42

## 2024-10-16 RX ADMIN — DOXYCYCLINE 100 MG: 100 TABLET, FILM COATED ORAL at 22:03

## 2024-10-16 ASSESSMENT — FIBROSIS 4 INDEX: FIB4 SCORE: 0.62

## 2024-10-20 ENCOUNTER — APPOINTMENT (OUTPATIENT)
Dept: RADIOLOGY | Facility: MEDICAL CENTER | Age: 49
End: 2024-10-20
Attending: EMERGENCY MEDICINE
Payer: MEDICAID

## 2024-10-20 ENCOUNTER — HOSPITAL ENCOUNTER (EMERGENCY)
Facility: MEDICAL CENTER | Age: 49
End: 2024-10-20
Attending: EMERGENCY MEDICINE
Payer: MEDICAID

## 2024-10-20 ENCOUNTER — PHARMACY VISIT (OUTPATIENT)
Dept: PHARMACY | Facility: MEDICAL CENTER | Age: 49
End: 2024-10-20
Payer: COMMERCIAL

## 2024-10-20 VITALS
DIASTOLIC BLOOD PRESSURE: 54 MMHG | HEIGHT: 61 IN | TEMPERATURE: 98 F | HEART RATE: 62 BPM | BODY MASS INDEX: 52.49 KG/M2 | WEIGHT: 278 LBS | RESPIRATION RATE: 16 BRPM | OXYGEN SATURATION: 93 % | SYSTOLIC BLOOD PRESSURE: 104 MMHG

## 2024-10-20 DIAGNOSIS — M54.42 LEFT-SIDED LOW BACK PAIN WITH LEFT-SIDED SCIATICA, UNSPECIFIED CHRONICITY: ICD-10-CM

## 2024-10-20 DIAGNOSIS — M25.552 PAIN OF LEFT HIP: ICD-10-CM
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
## 2024-10-20 PROCEDURE — 700111 HCHG RX REV CODE 636 W/ 250 OVERRIDE (IP): Mod: JZ,UD | Performed by: EMERGENCY MEDICINE

## 2024-10-20 PROCEDURE — RXMED WILLOW AMBULATORY MEDICATION CHARGE: Performed by: EMERGENCY MEDICINE

## 2024-10-20 PROCEDURE — 96372 THER/PROPH/DIAG INJ SC/IM: CPT

## 2024-10-20 PROCEDURE — 99285 EMERGENCY DEPT VISIT HI MDM: CPT

## 2024-10-20 PROCEDURE — 73700 CT LOWER EXTREMITY W/O DYE: CPT | Mod: LT

## 2024-10-20 PROCEDURE — 72131 CT LUMBAR SPINE W/O DYE: CPT

## 2024-10-20 RX ORDER — CYCLOBENZAPRINE HCL 10 MG
10 TABLET ORAL 3 TIMES DAILY PRN
Qty: 15 TABLET | Refills: 1 | Status: ON HOLD | OUTPATIENT
Start: 2024-10-20 | End: 2024-10-31

## 2024-10-20 RX ORDER — CYCLOBENZAPRINE HCL 10 MG
10 TABLET ORAL 3 TIMES DAILY PRN
Qty: 20 TABLET | Refills: 0 | Status: ON HOLD | OUTPATIENT
Start: 2024-10-20 | End: 2024-10-31

## 2024-10-20 RX ORDER — KETOROLAC TROMETHAMINE 15 MG/ML
15 INJECTION, SOLUTION INTRAMUSCULAR; INTRAVENOUS ONCE
Status: COMPLETED | OUTPATIENT
Start: 2024-10-20 | End: 2024-10-20

## 2024-10-20 RX ADMIN — KETOROLAC TROMETHAMINE 15 MG: 15 INJECTION, SOLUTION INTRAMUSCULAR; INTRAVENOUS at 13:08

## 2024-10-20 ASSESSMENT — PAIN DESCRIPTION - PAIN TYPE: TYPE: ACUTE PAIN

## 2024-10-20 ASSESSMENT — FIBROSIS 4 INDEX: FIB4 SCORE: 0.7

## 2024-10-22 ENCOUNTER — HOSPITAL ENCOUNTER (EMERGENCY)
Facility: MEDICAL CENTER | Age: 49
End: 2024-10-22
Attending: EMERGENCY MEDICINE
Payer: MEDICAID

## 2024-10-22 ENCOUNTER — APPOINTMENT (OUTPATIENT)
Dept: RADIOLOGY | Facility: MEDICAL CENTER | Age: 49
End: 2024-10-22
Attending: EMERGENCY MEDICINE
Payer: MEDICAID

## 2024-10-22 VITALS
WEIGHT: 278 LBS | RESPIRATION RATE: 16 BRPM | BODY MASS INDEX: 52.49 KG/M2 | SYSTOLIC BLOOD PRESSURE: 128 MMHG | OXYGEN SATURATION: 92 % | DIASTOLIC BLOOD PRESSURE: 56 MMHG | HEART RATE: 64 BPM | HEIGHT: 61 IN | TEMPERATURE: 98.2 F

## 2024-10-22 DIAGNOSIS — E11.9 TYPE 2 DIABETES MELLITUS WITHOUT COMPLICATION, WITHOUT LONG-TERM CURRENT USE OF INSULIN (HCC): ICD-10-CM

## 2024-10-22 DIAGNOSIS — R07.9 CHEST PAIN, UNSPECIFIED TYPE: ICD-10-CM

## 2024-10-22 DIAGNOSIS — R42 DIZZINESS: ICD-10-CM

## 2024-10-22 LAB
ALBUMIN SERPL BCP-MCNC: 4.1 G/DL (ref 3.2–4.9)
ALBUMIN/GLOB SERPL: 1.5 G/DL
ALP SERPL-CCNC: 133 U/L (ref 30–99)
ALT SERPL-CCNC: 13 U/L (ref 2–50)
ANION GAP SERPL CALC-SCNC: 9 MMOL/L (ref 7–16)
AST SERPL-CCNC: 13 U/L (ref 12–45)
BASOPHILS # BLD AUTO: 0.2 % (ref 0–1.8)
BASOPHILS # BLD: 0.02 K/UL (ref 0–0.12)
BILIRUB SERPL-MCNC: 0.5 MG/DL (ref 0.1–1.5)
BUN SERPL-MCNC: 16 MG/DL (ref 8–22)
CALCIUM ALBUM COR SERPL-MCNC: 9.3 MG/DL (ref 8.5–10.5)
CALCIUM SERPL-MCNC: 9.4 MG/DL (ref 8.5–10.5)
CHLORIDE SERPL-SCNC: 106 MMOL/L (ref 96–112)
CO2 SERPL-SCNC: 26 MMOL/L (ref 20–33)
CREAT SERPL-MCNC: 0.96 MG/DL (ref 0.5–1.4)
EKG IMPRESSION: NORMAL
EOSINOPHIL # BLD AUTO: 0.11 K/UL (ref 0–0.51)
EOSINOPHIL NFR BLD: 0.9 % (ref 0–6.9)
ERYTHROCYTE [DISTWIDTH] IN BLOOD BY AUTOMATED COUNT: 53 FL (ref 35.9–50)
GFR SERPLBLD CREATININE-BSD FMLA CKD-EPI: 72 ML/MIN/1.73 M 2
GLOBULIN SER CALC-MCNC: 2.8 G/DL (ref 1.9–3.5)
GLUCOSE SERPL-MCNC: 104 MG/DL (ref 65–99)
HCT VFR BLD AUTO: 38.1 % (ref 37–47)
HGB BLD-MCNC: 11.9 G/DL (ref 12–16)
IMM GRANULOCYTES # BLD AUTO: 0.04 K/UL (ref 0–0.11)
IMM GRANULOCYTES NFR BLD AUTO: 0.3 % (ref 0–0.9)
LYMPHOCYTES # BLD AUTO: 2.07 K/UL (ref 1–4.8)
LYMPHOCYTES NFR BLD: 17.7 % (ref 22–41)
MCH RBC QN AUTO: 26.9 PG (ref 27–33)
MCHC RBC AUTO-ENTMCNC: 31.2 G/DL (ref 32.2–35.5)
MCV RBC AUTO: 86.2 FL (ref 81.4–97.8)
MONOCYTES # BLD AUTO: 0.72 K/UL (ref 0–0.85)
MONOCYTES NFR BLD AUTO: 6.2 % (ref 0–13.4)
NEUTROPHILS # BLD AUTO: 8.71 K/UL (ref 1.82–7.42)
NEUTROPHILS NFR BLD: 74.7 % (ref 44–72)
NRBC # BLD AUTO: 0 K/UL
NRBC BLD-RTO: 0 /100 WBC (ref 0–0.2)
NT-PROBNP SERPL IA-MCNC: 283 PG/ML (ref 0–125)
PLATELET # BLD AUTO: 234 K/UL (ref 164–446)
PMV BLD AUTO: 11.4 FL (ref 9–12.9)
POTASSIUM SERPL-SCNC: 4.2 MMOL/L (ref 3.6–5.5)
PROT SERPL-MCNC: 6.9 G/DL (ref 6–8.2)
RBC # BLD AUTO: 4.42 M/UL (ref 4.2–5.4)
SODIUM SERPL-SCNC: 141 MMOL/L (ref 135–145)
TROPONIN T SERPL-MCNC: 33 NG/L (ref 6–19)
TROPONIN T SERPL-MCNC: 33 NG/L (ref 6–19)
WBC # BLD AUTO: 11.7 K/UL (ref 4.8–10.8)

## 2024-10-22 PROCEDURE — 84484 ASSAY OF TROPONIN QUANT: CPT

## 2024-10-22 PROCEDURE — 99285 EMERGENCY DEPT VISIT HI MDM: CPT

## 2024-10-22 PROCEDURE — 93005 ELECTROCARDIOGRAM TRACING: CPT | Performed by: EMERGENCY MEDICINE

## 2024-10-22 PROCEDURE — 71045 X-RAY EXAM CHEST 1 VIEW: CPT

## 2024-10-22 PROCEDURE — 80053 COMPREHEN METABOLIC PANEL: CPT

## 2024-10-22 PROCEDURE — 83880 ASSAY OF NATRIURETIC PEPTIDE: CPT

## 2024-10-22 PROCEDURE — 700102 HCHG RX REV CODE 250 W/ 637 OVERRIDE(OP): Mod: UD | Performed by: EMERGENCY MEDICINE

## 2024-10-22 PROCEDURE — 93005 ELECTROCARDIOGRAM TRACING: CPT

## 2024-10-22 PROCEDURE — 85025 COMPLETE CBC W/AUTO DIFF WBC: CPT

## 2024-10-22 PROCEDURE — A9270 NON-COVERED ITEM OR SERVICE: HCPCS | Mod: UD | Performed by: EMERGENCY MEDICINE

## 2024-10-22 PROCEDURE — 36415 COLL VENOUS BLD VENIPUNCTURE: CPT

## 2024-10-22 RX ORDER — ACETAMINOPHEN 325 MG/1
650 TABLET ORAL ONCE
Status: COMPLETED | OUTPATIENT
Start: 2024-10-22 | End: 2024-10-22

## 2024-10-22 RX ADMIN — ACETAMINOPHEN 650 MG: 325 TABLET ORAL at 18:30

## 2024-10-22 ASSESSMENT — FIBROSIS 4 INDEX: FIB4 SCORE: 0.7

## 2024-10-29 ENCOUNTER — HOSPITAL ENCOUNTER (OUTPATIENT)
Facility: MEDICAL CENTER | Age: 49
End: 2024-10-29
Attending: STUDENT IN AN ORGANIZED HEALTH CARE EDUCATION/TRAINING PROGRAM
Payer: MEDICAID

## 2024-10-29 PROCEDURE — 82274 ASSAY TEST FOR BLOOD FECAL: CPT

## 2024-10-30 ENCOUNTER — APPOINTMENT (OUTPATIENT)
Dept: RADIOLOGY | Facility: MEDICAL CENTER | Age: 49
DRG: 504 | End: 2024-10-30
Attending: STUDENT IN AN ORGANIZED HEALTH CARE EDUCATION/TRAINING PROGRAM
Payer: MEDICAID

## 2024-10-30 ENCOUNTER — NON-PROVIDER VISIT (OUTPATIENT)
Dept: SLEEP MEDICINE | Facility: MEDICAL CENTER | Age: 49
End: 2024-10-30
Payer: MEDICAID

## 2024-10-30 ENCOUNTER — HOSPITAL ENCOUNTER (INPATIENT)
Facility: MEDICAL CENTER | Age: 49
End: 2024-10-30
Attending: STUDENT IN AN ORGANIZED HEALTH CARE EDUCATION/TRAINING PROGRAM | Admitting: HOSPITALIST
Payer: MEDICAID

## 2024-10-30 VITALS — WEIGHT: 278 LBS | HEIGHT: 61 IN | BODY MASS INDEX: 52.49 KG/M2

## 2024-10-30 DIAGNOSIS — L29.9 ITCH: ICD-10-CM

## 2024-10-30 DIAGNOSIS — S92.102A CLOSED NONDISPLACED FRACTURE OF LEFT TALUS, UNSPECIFIED PORTION OF TALUS, INITIAL ENCOUNTER: ICD-10-CM

## 2024-10-30 DIAGNOSIS — J44.89 ASTHMA-COPD OVERLAP SYNDROME (HCC): ICD-10-CM

## 2024-10-30 DIAGNOSIS — S82.892A CLOSED LEFT ANKLE FRACTURE, INITIAL ENCOUNTER: ICD-10-CM

## 2024-10-30 DIAGNOSIS — S92.102A CLOSED DISPLACED FRACTURE OF LEFT TALUS, UNSPECIFIED PORTION OF TALUS, INITIAL ENCOUNTER: ICD-10-CM

## 2024-10-30 DIAGNOSIS — K21.9 GASTROESOPHAGEAL REFLUX DISEASE WITHOUT ESOPHAGITIS: ICD-10-CM

## 2024-10-30 DIAGNOSIS — M54.42 LEFT-SIDED LOW BACK PAIN WITH LEFT-SIDED SCIATICA, UNSPECIFIED CHRONICITY: ICD-10-CM

## 2024-10-30 DIAGNOSIS — I48.0 PAROXYSMAL ATRIAL FIBRILLATION (HCC): ICD-10-CM

## 2024-10-30 DIAGNOSIS — Z79.899 ON DEEP VEIN THROMBOSIS (DVT) PROPHYLAXIS: ICD-10-CM

## 2024-10-30 DIAGNOSIS — Z91.89 AT RISK FOR CONSTIPATION: ICD-10-CM

## 2024-10-30 PROCEDURE — 94726 PLETHYSMOGRAPHY LUNG VOLUMES: CPT | Mod: 26 | Performed by: INTERNAL MEDICINE

## 2024-10-30 PROCEDURE — 94726 PLETHYSMOGRAPHY LUNG VOLUMES: CPT

## 2024-10-30 PROCEDURE — 73610 X-RAY EXAM OF ANKLE: CPT | Mod: LT

## 2024-10-30 PROCEDURE — 99285 EMERGENCY DEPT VISIT HI MDM: CPT

## 2024-10-30 PROCEDURE — 94729 DIFFUSING CAPACITY: CPT | Mod: 26 | Performed by: INTERNAL MEDICINE

## 2024-10-30 PROCEDURE — 94729 DIFFUSING CAPACITY: CPT

## 2024-10-30 PROCEDURE — 94060 EVALUATION OF WHEEZING: CPT | Mod: 26 | Performed by: INTERNAL MEDICINE

## 2024-10-30 PROCEDURE — 94060 EVALUATION OF WHEEZING: CPT

## 2024-10-30 ASSESSMENT — PAIN DESCRIPTION - PAIN TYPE: TYPE: ACUTE PAIN

## 2024-10-30 ASSESSMENT — FIBROSIS 4 INDEX
FIB4 SCORE: 0.79
FIB4 SCORE: 0.79

## 2024-10-31 ENCOUNTER — APPOINTMENT (OUTPATIENT)
Dept: RADIOLOGY | Facility: MEDICAL CENTER | Age: 49
DRG: 504 | End: 2024-10-31
Attending: STUDENT IN AN ORGANIZED HEALTH CARE EDUCATION/TRAINING PROGRAM
Payer: MEDICAID

## 2024-10-31 VITALS
BODY MASS INDEX: 51.54 KG/M2 | TEMPERATURE: 97 F | OXYGEN SATURATION: 92 % | HEIGHT: 61 IN | DIASTOLIC BLOOD PRESSURE: 57 MMHG | WEIGHT: 273 LBS | SYSTOLIC BLOOD PRESSURE: 94 MMHG | HEART RATE: 69 BPM | RESPIRATION RATE: 16 BRPM

## 2024-10-31 PROBLEM — S82.892A CLOSED LEFT ANKLE FRACTURE, INITIAL ENCOUNTER: Status: ACTIVE | Noted: 2024-10-31

## 2024-10-31 PROBLEM — S92.109A TALUS FRACTURE: Status: ACTIVE | Noted: 2024-10-31

## 2024-10-31 PROCEDURE — 700111 HCHG RX REV CODE 636 W/ 250 OVERRIDE (IP): Performed by: HOSPITALIST

## 2024-10-31 PROCEDURE — 97166 OT EVAL MOD COMPLEX 45 MIN: CPT

## 2024-10-31 PROCEDURE — 700102 HCHG RX REV CODE 250 W/ 637 OVERRIDE(OP): Performed by: HOSPITALIST

## 2024-10-31 PROCEDURE — 770001 HCHG ROOM/CARE - MED/SURG/GYN PRIV*

## 2024-10-31 PROCEDURE — 90471 IMMUNIZATION ADMIN: CPT

## 2024-10-31 PROCEDURE — 73700 CT LOWER EXTREMITY W/O DYE: CPT | Mod: LT

## 2024-10-31 PROCEDURE — A9270 NON-COVERED ITEM OR SERVICE: HCPCS | Performed by: HOSPITALIST

## 2024-10-31 PROCEDURE — 302875 HCHG BANDAGE ACE 4 OR 6""

## 2024-10-31 PROCEDURE — 99233 SBSQ HOSP IP/OBS HIGH 50: CPT | Performed by: HOSPITALIST

## 2024-10-31 PROCEDURE — A9270 NON-COVERED ITEM OR SERVICE: HCPCS | Mod: UD | Performed by: STUDENT IN AN ORGANIZED HEALTH CARE EDUCATION/TRAINING PROGRAM

## 2024-10-31 PROCEDURE — 99223 1ST HOSP IP/OBS HIGH 75: CPT | Performed by: STUDENT IN AN ORGANIZED HEALTH CARE EDUCATION/TRAINING PROGRAM

## 2024-10-31 PROCEDURE — 302874 HCHG BANDAGE ACE 2 OR 3""

## 2024-10-31 PROCEDURE — 90656 IIV3 VACC NO PRSV 0.5 ML IM: CPT | Performed by: HOSPITALIST

## 2024-10-31 PROCEDURE — 97163 PT EVAL HIGH COMPLEX 45 MIN: CPT

## 2024-10-31 PROCEDURE — 29515 APPLICATION SHORT LEG SPLINT: CPT

## 2024-10-31 PROCEDURE — 700102 HCHG RX REV CODE 250 W/ 637 OVERRIDE(OP): Mod: UD | Performed by: STUDENT IN AN ORGANIZED HEALTH CARE EDUCATION/TRAINING PROGRAM

## 2024-10-31 PROCEDURE — 99255 IP/OBS CONSLTJ NEW/EST HI 80: CPT | Performed by: PHYSICAL MEDICINE & REHABILITATION

## 2024-10-31 RX ORDER — ACETAMINOPHEN 325 MG/1
650 TABLET ORAL EVERY 4 HOURS PRN
COMMUNITY

## 2024-10-31 RX ORDER — DOXYCYCLINE HYCLATE 100 MG
100 TABLET ORAL 2 TIMES DAILY
Status: ON HOLD | COMMUNITY
End: 2024-11-03

## 2024-10-31 RX ORDER — ASCORBIC ACID 500 MG
500 TABLET ORAL DAILY
COMMUNITY

## 2024-10-31 RX ORDER — OXYCODONE HYDROCHLORIDE 5 MG/1
2.5 TABLET ORAL
Status: DISCONTINUED | OUTPATIENT
Start: 2024-10-31 | End: 2024-11-03 | Stop reason: HOSPADM

## 2024-10-31 RX ORDER — PROCHLORPERAZINE EDISYLATE 5 MG/ML
5-10 INJECTION INTRAMUSCULAR; INTRAVENOUS EVERY 4 HOURS PRN
Status: DISCONTINUED | OUTPATIENT
Start: 2024-10-31 | End: 2024-11-03 | Stop reason: HOSPADM

## 2024-10-31 RX ORDER — METOPROLOL SUCCINATE 25 MG/1
25 TABLET, EXTENDED RELEASE ORAL DAILY
Status: DISCONTINUED | OUTPATIENT
Start: 2024-10-31 | End: 2024-11-03 | Stop reason: HOSPADM

## 2024-10-31 RX ORDER — ENOXAPARIN SODIUM 100 MG/ML
40 INJECTION SUBCUTANEOUS DAILY
Status: DISCONTINUED | OUTPATIENT
Start: 2024-10-31 | End: 2024-10-31

## 2024-10-31 RX ORDER — PROMETHAZINE HYDROCHLORIDE 25 MG/1
12.5-25 SUPPOSITORY RECTAL EVERY 4 HOURS PRN
Status: DISCONTINUED | OUTPATIENT
Start: 2024-10-31 | End: 2024-11-03 | Stop reason: HOSPADM

## 2024-10-31 RX ORDER — OXYCODONE HYDROCHLORIDE 5 MG/1
5 TABLET ORAL
Status: DISCONTINUED | OUTPATIENT
Start: 2024-10-31 | End: 2024-11-03 | Stop reason: HOSPADM

## 2024-10-31 RX ORDER — ONDANSETRON 2 MG/ML
4 INJECTION INTRAMUSCULAR; INTRAVENOUS EVERY 4 HOURS PRN
Status: DISCONTINUED | OUTPATIENT
Start: 2024-10-31 | End: 2024-11-03 | Stop reason: HOSPADM

## 2024-10-31 RX ORDER — KETOCONAZOLE 20 MG/G
1 CREAM TOPICAL 2 TIMES DAILY
COMMUNITY

## 2024-10-31 RX ORDER — CYCLOBENZAPRINE HCL 10 MG
10 TABLET ORAL 3 TIMES DAILY PRN
Status: DISCONTINUED | OUTPATIENT
Start: 2024-10-31 | End: 2024-11-03 | Stop reason: HOSPADM

## 2024-10-31 RX ORDER — PROMETHAZINE HYDROCHLORIDE 25 MG/1
12.5-25 TABLET ORAL EVERY 4 HOURS PRN
Status: DISCONTINUED | OUTPATIENT
Start: 2024-10-31 | End: 2024-11-03 | Stop reason: HOSPADM

## 2024-10-31 RX ORDER — ONDANSETRON 4 MG/1
4 TABLET, ORALLY DISINTEGRATING ORAL EVERY 4 HOURS PRN
Status: DISCONTINUED | OUTPATIENT
Start: 2024-10-31 | End: 2024-11-03 | Stop reason: HOSPADM

## 2024-10-31 RX ORDER — CALCIUM CARBONATE 500(1250)
500 TABLET ORAL 2 TIMES DAILY WITH MEALS
Status: DISCONTINUED | OUTPATIENT
Start: 2024-10-31 | End: 2024-11-03 | Stop reason: HOSPADM

## 2024-10-31 RX ORDER — FERROUS SULFATE 325(65) MG
325 TABLET ORAL
COMMUNITY

## 2024-10-31 RX ORDER — ATORVASTATIN CALCIUM 20 MG/1
20 TABLET, FILM COATED ORAL
Status: DISCONTINUED | OUTPATIENT
Start: 2024-10-31 | End: 2024-11-03 | Stop reason: HOSPADM

## 2024-10-31 RX ORDER — ARIPIPRAZOLE 15 MG/1
30 TABLET ORAL EVERY MORNING
Status: DISCONTINUED | OUTPATIENT
Start: 2024-10-31 | End: 2024-11-03 | Stop reason: HOSPADM

## 2024-10-31 RX ORDER — MONTELUKAST SODIUM 10 MG/1
10 TABLET ORAL EVERY EVENING
Status: DISCONTINUED | OUTPATIENT
Start: 2024-10-31 | End: 2024-10-31

## 2024-10-31 RX ORDER — NICOTINE 21 MG/24HR
1 PATCH, TRANSDERMAL 24 HOURS TRANSDERMAL EVERY 24 HOURS
COMMUNITY

## 2024-10-31 RX ORDER — HYDROMORPHONE HYDROCHLORIDE 1 MG/ML
0.25 INJECTION, SOLUTION INTRAMUSCULAR; INTRAVENOUS; SUBCUTANEOUS
Status: DISCONTINUED | OUTPATIENT
Start: 2024-10-31 | End: 2024-11-03 | Stop reason: HOSPADM

## 2024-10-31 RX ORDER — IBUPROFEN 200 MG
400 TABLET ORAL EVERY 6 HOURS PRN
Status: ON HOLD | COMMUNITY
End: 2024-11-03

## 2024-10-31 RX ORDER — LABETALOL HYDROCHLORIDE 5 MG/ML
10 INJECTION, SOLUTION INTRAVENOUS EVERY 4 HOURS PRN
Status: DISCONTINUED | OUTPATIENT
Start: 2024-10-31 | End: 2024-11-03 | Stop reason: HOSPADM

## 2024-10-31 RX ORDER — ACETAMINOPHEN 325 MG/1
650 TABLET ORAL EVERY 6 HOURS PRN
Status: DISCONTINUED | OUTPATIENT
Start: 2024-10-31 | End: 2024-11-03 | Stop reason: HOSPADM

## 2024-10-31 RX ORDER — GABAPENTIN 300 MG/1
300 CAPSULE ORAL 3 TIMES DAILY
Status: DISCONTINUED | OUTPATIENT
Start: 2024-10-31 | End: 2024-11-03 | Stop reason: HOSPADM

## 2024-10-31 RX ADMIN — OXYCODONE 5 MG: 5 TABLET ORAL at 17:25

## 2024-10-31 RX ADMIN — MOMETASONE FUROATE AND FORMOTEROL FUMARATE DIHYDRATE 1 PUFF: 200; 5 AEROSOL RESPIRATORY (INHALATION) at 05:07

## 2024-10-31 RX ADMIN — OXYCODONE 5 MG: 5 TABLET ORAL at 09:42

## 2024-10-31 RX ADMIN — OXYCODONE 5 MG: 5 TABLET ORAL at 13:24

## 2024-10-31 RX ADMIN — ATORVASTATIN CALCIUM 20 MG: 20 TABLET, FILM COATED ORAL at 20:11

## 2024-10-31 RX ADMIN — GABAPENTIN 300 MG: 300 CAPSULE ORAL at 09:42

## 2024-10-31 RX ADMIN — APIXABAN 5 MG: 5 TABLET, FILM COATED ORAL at 05:07

## 2024-10-31 RX ADMIN — CHOLECALCIFEROL TAB 125 MCG (5000 UNIT) 5000 UNITS: 125 TAB at 14:14

## 2024-10-31 RX ADMIN — CALCIUM 500 MG: 500 TABLET ORAL at 16:49

## 2024-10-31 RX ADMIN — OXYCODONE 5 MG: 5 TABLET ORAL at 02:29

## 2024-10-31 RX ADMIN — INFLUENZA A VIRUS A/VICTORIA/4897/2022 IVR-238 (H1N1) ANTIGEN (FORMALDEHYDE INACTIVATED), INFLUENZA A VIRUS A/CALIFORNIA/122/2022 SAN-022 (H3N2) ANTIGEN (FORMALDEHYDE INACTIVATED), AND INFLUENZA B VIRUS B/MICHIGAN/01/2021 ANTIGEN (FORMALDEHYDE INACTIVATED) 0.5 ML: 15; 15; 15 INJECTION, SUSPENSION INTRAMUSCULAR at 14:14

## 2024-10-31 RX ADMIN — ATORVASTATIN CALCIUM 20 MG: 20 TABLET, FILM COATED ORAL at 05:07

## 2024-10-31 RX ADMIN — ARIPIPRAZOLE 30 MG: 15 TABLET ORAL at 06:31

## 2024-10-31 RX ADMIN — GABAPENTIN 300 MG: 300 CAPSULE ORAL at 16:49

## 2024-10-31 RX ADMIN — METOPROLOL SUCCINATE 25 MG: 25 TABLET, EXTENDED RELEASE ORAL at 05:07

## 2024-10-31 RX ADMIN — OXYCODONE 5 MG: 5 TABLET ORAL at 20:11

## 2024-10-31 RX ADMIN — GABAPENTIN 300 MG: 300 CAPSULE ORAL at 05:08

## 2024-10-31 ASSESSMENT — COGNITIVE AND FUNCTIONAL STATUS - GENERAL
DRESSING REGULAR LOWER BODY CLOTHING: A LOT
TURNING FROM BACK TO SIDE WHILE IN FLAT BAD: A LOT
HELP NEEDED FOR BATHING: A LOT
STANDING UP FROM CHAIR USING ARMS: TOTAL
WALKING IN HOSPITAL ROOM: TOTAL
DAILY ACTIVITIY SCORE: 16
MOBILITY SCORE: 9
DRESSING REGULAR UPPER BODY CLOTHING: A LITTLE
TOILETING: A LOT
MOVING FROM LYING ON BACK TO SITTING ON SIDE OF FLAT BED: A LOT
SUGGESTED CMS G CODE MODIFIER DAILY ACTIVITY: CK
SUGGESTED CMS G CODE MODIFIER MOBILITY: CM
PERSONAL GROOMING: A LITTLE
CLIMB 3 TO 5 STEPS WITH RAILING: TOTAL
MOVING TO AND FROM BED TO CHAIR: A LOT

## 2024-10-31 ASSESSMENT — ENCOUNTER SYMPTOMS
GASTROINTESTINAL NEGATIVE: 1
RESPIRATORY NEGATIVE: 1
SHORTNESS OF BREATH: 0
NERVOUS/ANXIOUS: 0
NEUROLOGICAL NEGATIVE: 1
CONSTITUTIONAL NEGATIVE: 1
NAUSEA: 0
FALLS: 1
CARDIOVASCULAR NEGATIVE: 1
HEADACHES: 0
PSYCHIATRIC NEGATIVE: 1
FEVER: 0
ABDOMINAL PAIN: 0
EYES NEGATIVE: 1
FALLS: 0
DIZZINESS: 0

## 2024-10-31 ASSESSMENT — PAIN DESCRIPTION - PAIN TYPE
TYPE: ACUTE PAIN

## 2024-10-31 ASSESSMENT — LIFESTYLE VARIABLES
EVER FELT BAD OR GUILTY ABOUT YOUR DRINKING: NO
AVERAGE NUMBER OF DAYS PER WEEK YOU HAVE A DRINK CONTAINING ALCOHOL: 0
ALCOHOL_USE: NO
HAVE PEOPLE ANNOYED YOU BY CRITICIZING YOUR DRINKING: NO
TOTAL SCORE: 0
TOTAL SCORE: 0
HAVE YOU EVER FELT YOU SHOULD CUT DOWN ON YOUR DRINKING: NO
TOTAL SCORE: 0
ON A TYPICAL DAY WHEN YOU DRINK ALCOHOL HOW MANY DRINKS DO YOU HAVE: 0
CONSUMPTION TOTAL: NEGATIVE
HOW MANY TIMES IN THE PAST YEAR HAVE YOU HAD 5 OR MORE DRINKS IN A DAY: 0
EVER HAD A DRINK FIRST THING IN THE MORNING TO STEADY YOUR NERVES TO GET RID OF A HANGOVER: NO

## 2024-10-31 ASSESSMENT — ACTIVITIES OF DAILY LIVING (ADL): TOILETING: INDEPENDENT

## 2024-10-31 ASSESSMENT — SOCIAL DETERMINANTS OF HEALTH (SDOH)
WITHIN THE LAST YEAR, HAVE YOU BEEN KICKED, HIT, SLAPPED, OR OTHERWISE PHYSICALLY HURT BY YOUR PARTNER OR EX-PARTNER?: NO
WITHIN THE LAST YEAR, HAVE YOU BEEN HUMILIATED OR EMOTIONALLY ABUSED IN OTHER WAYS BY YOUR PARTNER OR EX-PARTNER?: NO
IN THE PAST 12 MONTHS, HAS THE ELECTRIC, GAS, OIL, OR WATER COMPANY THREATENED TO SHUT OFF SERVICE IN YOUR HOME?: NO
WITHIN THE LAST YEAR, HAVE TO BEEN RAPED OR FORCED TO HAVE ANY KIND OF SEXUAL ACTIVITY BY YOUR PARTNER OR EX-PARTNER?: NO
WITHIN THE LAST YEAR, HAVE YOU BEEN AFRAID OF YOUR PARTNER OR EX-PARTNER?: NO
WITHIN THE PAST 12 MONTHS, YOU WORRIED THAT YOUR FOOD WOULD RUN OUT BEFORE YOU GOT THE MONEY TO BUY MORE: NEVER TRUE
WITHIN THE PAST 12 MONTHS, THE FOOD YOU BOUGHT JUST DIDN'T LAST AND YOU DIDN'T HAVE MONEY TO GET MORE: NEVER TRUE

## 2024-10-31 ASSESSMENT — CHA2DS2 SCORE
AGE 75 OR GREATER: NO
SEX: FEMALE
HYPERTENSION: NO
DIABETES: NO
PRIOR STROKE OR TIA OR THROMBOEMBOLISM: NO
CHF OR LEFT VENTRICULAR DYSFUNCTION: YES
VASCULAR DISEASE: NO
CHA2DS2 VASC SCORE: 2
AGE 65 TO 74: NO

## 2024-10-31 ASSESSMENT — GAIT ASSESSMENTS: GAIT LEVEL OF ASSIST: UNABLE TO PARTICIPATE

## 2024-10-31 NOTE — ED NOTES
PT resides at [facility] ([phone]) and was not  sent with a MAR. I called the care facility and requested one be faxed. Historically, [facility] staff has not been efficient with getting MARS sent to us. If no MAR is sent, I will historically review med list without dose times.

## 2024-10-31 NOTE — ED NOTES
Bedside report received from off going RN/tech: Jesus, assumed care of patient.  POC discussed with patient. Call light within reach, all needs addressed at this time.       Fall risk interventions in place: Not Applicable (all applicable per [place] Fall risk assessment)   Continuous monitoring: Not Applicable   IVF/IV medications: Not Applicable   Oxygen: Room Air  Bedside sitter: Not Applicable   Isolation: Not Applicable

## 2024-10-31 NOTE — ED TRIAGE NOTES
"Chief Complaint   Patient presents with    Ankle Pain     Left ankle pain x 3 days. Pt reports she was bowling when she rolled her ankle, denies fall.     Low Back Pain     X 1 day, denies trauma        48 yo F BIB EMS to triage for above complaint.      Pt placed in lobby. Pt educated on triage process. Pt encouraged to alert staff for any changes.     Patient and staff wearing appropriate PPE    BP (!) 157/62   Pulse 68   Temp 35.9 °C (96.7 °F) (Temporal)   Resp 15   Ht 1.549 m (5' 1\")   Wt 124 kg (273 lb)   LMP  (LMP Unknown)   SpO2 97%   BMI 51.58 kg/m²     "

## 2024-10-31 NOTE — ED NOTES
Pt wheeled from [facility] to [facility]. Connected to monitor, bed in lowest position, side rails up, given call light. Chart up for ERP.

## 2024-10-31 NOTE — ED NOTES
Report given to Shanon HENDRIX RN. Pt moved from [facility] to [facility] via [place]. Discussed POC. Bed in lowest position, connected to monitor, given call light.

## 2024-10-31 NOTE — THERAPY
Occupational Therapy   Initial Evaluation     Patient Name: Lorene Haro  Age:  49 y.o., Sex:  female  Medical Record #: [id number]  Today's Date: 10/31/2024     Precautions  Precautions: Fall Risk, Non Weight Bearing Left Lower Extremity    Assessment  Patient is a 49 y.o. female who presents with left ankle pain for 3 days.  Patient reports that she rolled her ankle while bowling. Pt found to have closed displaced fracture of left talus. Ortho recommends delaying surgery for about 10 days. During OT eval, pt limited pain, difficulty sequencing transfer while maintaining NWB LLE, and likely hypotension with upright activity, but unable to obtain BP in the moment, all of which is impacting her safety and independence with ADLs and ADL transfers.      Plan    Occupational Therapy Initial Treatment Plan   Treatment Interventions: Self Care / Activities of Daily Living, Adaptive Equipment, Therapeutic Exercises, Therapeutic Activity  Treatment Frequency: 3 Times per Week  Duration: Until Therapy Goals Met    DC Equipment Recommendations: Tub Transfer Bench, Bed Side Commode  Discharge Recommendations: Recommend post-acute placement for additional occupational therapy services prior to discharge home     Subjective    Agreeable to therapy session      Objective       10/31/24 0951   Prior Living Situation   Prior Services Intermittent Physical Support for ADL Per Service   Housing / Facility 1 Story House;Group Home   Bathroom Set up Bathtub / Shower Combination;Grab Bars;Shower Chair   Equipment Owned 4-Wheel Walker;Grab Bar(s) In Tub / Shower;Tub / Shower Seat   Lives with - Patient's Self Care Capacity Attendant / Paid Care Giver   Comments Pt lives at a group home   Prior Level of ADL Function   Self Feeding Independent   Grooming / Hygiene Independent   Bathing Requires Assist   Dressing Requires Assist  (assist for socks)   Toileting Independent   Prior Level of IADL Function   Medication Management Requires  Assist   Laundry Requires Assist   Kitchen Mobility Requires Assist   Home Management Requires Assist   Shopping Requires Assist   Prior Level Of Mobility Independent With Device in Home  (uses 4WW)   Driving / Transportation Relatives / Others Provide Transportation   Comments Pt reports she wears oxygen at night   Precautions   Precautions Fall Risk;Non Weight Bearing Left Lower Extremity   Vitals   O2 (LPM) 2   O2 Delivery Device Nasal Cannula   Pain 0 - 10 Group   Therapist Pain Assessment During Activity;Nurse Notified  (Pt reports left ankle pain with activity, RN aware)   Cognition    Level of Consciousness Alert   Comments very pleasant and cooperative, motivated to continue with activity but limited by lightheadedness   Active ROM Upper Body   Active ROM Upper Body  WDL   Dominant Hand Right   Strength Upper Body   Upper Body Strength  WDL   Upper Body Muscle Tone   Upper Body Muscle Tone  WDL   Coordination Upper Body   Coordination WDL   Balance Assessment   Sitting Balance (Static) Fair   Sitting Balance (Dynamic) Fair   Standing Balance (Static) Poor +   Weight Shift Sitting Fair   Comments unable to to shift weight in stance, stood using FWW   Bed Mobility    Supine to Sit Moderate Assist  (assist for trunk)   Sit to Supine Minimal Assist   Scooting Contact Guard Assist   ADL Assessment   Eating Independent   Grooming Supervision;Seated   Lower Body Dressing Maximal Assist  (right shoe)   Toileting   (using external female catheter, unable to get to commode during session)   6 Clicks Daily Activity Score 16   Functional Mobility   Sit to Stand Minimal Assist   Mobility stood but unable to shift weight onto UEs to begin transfer, pt felt lightheaded at EOB, unable to obtain BP despite attempts, pt assisted BTB and BP obtained after supine for approx 3 minutes   Edema / Skin Assessment   Comments LLE elevated upon return to bed   Activity Tolerance   Comments pt's OOB activity limited by likely  hypotension, unable to obtain BP at EOB or immediately upon return to supine despite attempts   Patient / Family Goals   Patient / Family Goal #1 to return to group home when able   Short Term Goals   Short Term Goal # 1 Pt will transfer to/from [facility] with CGA while maintaining NWB LLE   Short Term Goal # 2 Pt will complete clothing management during toileting with CGA   Short Term Goal # 3 Pt will don shorts/underpants with CGA and use of AE prn   Education Group   Education Provided Role of Occupational Therapist;Weight Bearing Precautions   Role of Occupational Therapist Patient Response Patient;Acceptance;Explanation;Verbal Demonstration   Weight Bearing Precautions Patient Response Patient;Acceptance;Explanation;Demonstration;Reinforcement Needed   Occupational Therapy Initial Treatment Plan    Treatment Interventions Self Care / Activities of Daily Living;Adaptive Equipment;Therapeutic Exercises;Therapeutic Activity   Treatment Frequency 3 Times per Week   Duration Until Therapy Goals Met   Problem List   Problem List Decreased Active Daily Living Skills;Decreased Functional Mobility;Decreased Activity Tolerance;Impaired Postural Control / Balance  (Pain)     Pt seen for OT eval in coordination with PT due to the need for two skilled therapists due to limited mobility

## 2024-10-31 NOTE — PROGRESS NOTES
Hospital Medicine Daily Progress Note    Date of Service  10/31/2024    Chief Complaint  Left ankle pain    Hospital Course  Lorene Haro is a 49-year-old morbidly obese female who 3 days ago had rolled her ankle while she was bowling and had left ankle pain.  She was worked up and found to have on CT a vertically oriented fracture of the talus subacute to chronic.  Orthopedics was consulted and recommended outpatient surgery in 10 days.  However due to the medical complexities and inability of the patient to manage on her own at home orthopedics were reconsulted on 10/31 with the emergency room physician alerting us that orthopedist request inpatient admit to have surgical repair.    Interval Problem Update  10/31/24: Patient states that over the past 3 weeks she has been noticing left heel pain but still been getting around on it.  She bowls with the [employer] and was at her bowling event and was having left heel pain and looked down and saw to have significant bruising of the left ankle.  She sought care thereafter here in the emergency room.  Imaging showed vertical oriented fracture.  Patient currently with 8 out of 10 pain.  She is alert and oriented.  No fevers.  The patient does not remember any specific traumas to her left ankle    I have discussed this patient's plan of care and discharge plan at IDT rounds today with Case Management, Nursing, Nursing leadership, and other members of the IDT team.    Consultants/Specialty  orthopedics    Code Status  Full Code    Disposition  The patient is not medically cleared for discharge to home or a post-acute facility.  Anticipate discharge to: home with close outpatient follow-up    I have placed the appropriate orders for post-discharge needs.    Review of Systems  Review of Systems   Constitutional:  Negative for fever and malaise/fatigue.   Respiratory:  Negative for shortness of breath.    Cardiovascular:  Negative for chest pain and leg swelling.    Gastrointestinal:  Negative for abdominal pain and nausea.   Musculoskeletal:  Positive for joint pain. Negative for falls.   Neurological:  Negative for dizziness and headaches.   Psychiatric/Behavioral:  The patient is not nervous/anxious.         Physical Exam  Temp:  [35.9 °C (96.7 °F)-36.2 °C (97.1 °F)] 36 °C (96.8 °F)  Pulse:  [56-68] 57  Resp:  [15-22] 17  BP: ()/(52-77) 119/77  SpO2:  [94 %-98 %] 94 %    Physical Exam  Vitals reviewed.   Constitutional:       Appearance: She is obese. She is not ill-appearing.   HENT:      Head: Normocephalic and atraumatic.      Nose: No congestion.      Mouth/Throat:      Mouth: Mucous membranes are moist.   Eyes:      Extraocular Movements: Extraocular movements intact.      Conjunctiva/sclera: Conjunctivae normal.   Cardiovascular:      Rate and Rhythm: Normal rate and regular rhythm.      Heart sounds: No murmur heard.  Pulmonary:      Effort: No respiratory distress.      Breath sounds: Normal breath sounds. No wheezing.   Abdominal:      General: Bowel sounds are normal. There is no distension.      Palpations: Abdomen is soft.      Tenderness: There is no abdominal tenderness.   Musculoskeletal:      Right lower leg: No edema.      Left lower leg: No edema.   Lymphadenopathy:      Cervical: No cervical adenopathy.   Skin:     General: Skin is dry.   Neurological:      General: No focal deficit present.      Mental Status: She is alert and oriented to person, place, and time.      Cranial Nerves: No cranial nerve deficit.      Motor: No weakness.   Psychiatric:         Mood and Affect: Mood normal.         Thought Content: Thought content normal.         Fluids  No intake or output data in the 24 hours ending 10/31/24 1335     Laboratory                        Imaging  CT-FOOT W/O PLUS RECONS LEFT   Final Result         1. As noted on the prior conventional radiographs, there is a vertically oriented fracture of the talus. This appears subacute to chronic.    2. Osteopenia.   3. Osteoarthrosis.   4. Muscular atrophy.      DX-ANKLE 3+ VIEWS LEFT   Final Result      New fracture of the posterior talus.           Assessment/Plan  * Talus fracture  Assessment & Plan  CT imaging showing a subacute fracture of the left talus bone  Orthopedist has changed her mind and wanted patient admitted for consideration of surgical intervention  Vitamin D and calcium have been ordered   oxycodone as needed for pain  PT/OT status post surgery  Discussed with patient discussed with surgery recommend ice packing on and off to help with swelling and pain  Weightbearing per postsurgical recommendations from Ortho      Asthma-COPD overlap syndrome (HCC)- (present on admission)  Assessment & Plan  Continue home inhalers  No longer smoking    Hyperlipidemia- (present on admission)  Assessment & Plan  Lipitor    AF (atrial fibrillation) (HCC)- (present on admission)  Assessment & Plan  Continue metoprolol  Holding Eliquis for potential surgery    Morbid obesity (HCC)- (present on admission)  Assessment & Plan  BMI 51         VTE prophylaxis:   SCDs/TEDs      I have performed a physical exam and reviewed and updated ROS and Plan today (10/31/2024). In review of yesterday's note (10/30/2024), there are no changes except as documented above.

## 2024-10-31 NOTE — ED NOTES
When PT/OT attempted to stand with the pt, pt reports increase in pain to 9/10 from 4/10. Pt also reports dizziness with exertion of standing. VSS on monitor, pt medicated for pain per MAR.

## 2024-10-31 NOTE — ED PROVIDER NOTES
ED Provider Note    CHIEF COMPLAINT  Chief Complaint   Patient presents with    Ankle Pain     Left ankle pain x 3 days. Pt reports she was bowling when she rolled her ankle, denies fall.     Low Back Pain     X 1 day, denies trauma        EXTERNAL RECORDS REVIEWED  Inpatient Notes discharge summary on 2024 for a patient with COPD exacerbation    HPI/ROS  LIMITATION TO HISTORY   Select: : None  OUTSIDE HISTORIAN(S):    Lorene Haro is a 49 y.o. female who presents with left ankle pain for 3 days.  Patient reports that she rolled her ankle while bowling.  Patient reports more pain laterally.  Patient denies any other injury.  Patient reports that she feels like she has flared up her sciatica.  Patient denies bowel or bladder incontinence, urinary retention, lower extremity weakness or numbness, saddle anesthesia.  Patient denies weakness or numbness of the left lower extremity distally following the injury.  Patient denies head trauma.    PAST MEDICAL HISTORY   has a past medical history of A-fib ([facility]), MONI (acute kidney injury) ([facility]) (2023), Asthma, Bipolar 2 disorder ([facility]), Chickenpox, Chronic obstructive pulmonary disease ([facility]), Congestive heart failure ([facility]), Hypertension, On home oxygen therapy, Other psychological stress, Schizophrenic disorder ([facility]), and Yeast infection of the skin (2021).    SURGICAL HISTORY   has a past surgical history that includes hysterectomy laparoscopy; colectomy; cholecystectomy; and knee arthroscopy (Left).    FAMILY HISTORY  Family History   Problem Relation Age of Onset    No Known Problems Mother     Cancer Sister        SOCIAL HISTORY  Social History     Tobacco Use    Smoking status: Former     Current packs/day: 0.00     Types: Cigarettes     Quit date: 10/12/2022     Years since quittin.0    Smokeless tobacco: Never   Vaping Use    Vaping status: Never Used   Substance and Sexual Activity    Alcohol use: Not Currently    Drug use: Never    Sexual  "activity: Not Currently       CURRENT MEDICATIONS  Home Medications       Reviewed by Renetta Oconnor R.N. (Registered Nurse) on 10/30/24 at 2305  Med List Status: Not Addressed     Medication Last Dose Status   ADVAIR DISKUS 100-50 MCG/ACT AEROSOL POWDER, BREATH ACTIVATED  Active   apixaban (ELIQUIS) 5mg Tab  Active   aripiprazole (ABILIFY) 30 MG tablet  Active   atorvastatin (LIPITOR) 20 MG Tab  Active   cyclobenzaprine (FLEXERIL) 10 mg Tab  Active   cyclobenzaprine (FLEXERIL) 10 mg Tab  Active   fluticasone (FLONASE) 50 MCG/ACT nasal spray  Active   gabapentin (NEURONTIN) 300 MG Cap  Active   Home Care Oxygen  Active   hydrOXYzine HCl (ATARAX) 25 MG Tab  Active   lidocaine (LIDODERM) 5 % Patch  Active   lidocaine (LMX) 4 % Cream  Active   meclizine (ANTIVERT) 25 MG Tab  Active   metoprolol SR (TOPROL XL) 25 MG TABLET SR 24 HR  Active   montelukast (SINGULAIR) 10 MG Tab  Active   naproxen (NAPROSYN) 500 MG Tab  Active   nicotine (NICODERM) 14 MG/24HR PATCH 24 HR  Active   nicotine (NICODERM) 7 MG/24HR PATCH 24 HR  Active   omeprazole (PRILOSEC) 20 MG delayed-release capsule  Active   ondansetron (ZOFRAN ODT) 4 MG TABLET DISPERSIBLE  Active   Thiamine HCl (VITAMIN B1) 100 MG Tab  Active                  Audit from Redirected Encounters    **Home medications have not yet been reviewed for this encounter**         ALLERGIES  Allergies   Allergen Reactions    Penicillin G Rash and Itching     pt reports that she gets a rash all over her body and gets itchy    Amoxicillin Rash and Itching     Tolerates cephalosporins, pt reports that she gets a rash all over her body and gets itchy       PHYSICAL EXAM  VITAL SIGNS: /59   Pulse 60   Temp 36.1 °C (97 °F) (Temporal)   Resp 18   Ht 1.549 m (5' 1\")   Wt 124 kg (273 lb)   LMP  (LMP Unknown)   SpO2 95%   BMI 51.58 kg/m²    Vitals and nursing note reviewed.   Constitutional:       Comments: Patient is lying in bed supine, pleasant, conversant, speaking in " complete sentences   HENT:      Head: Normocephalic and atraumatic.   Eyes:      Extraocular Movements: Extraocular movements intact.      Conjunctiva/sclera: Conjunctivae normal.      Pupils: Pupils are equal, round, and reactive to light.   Cardiovascular:      Pulses: Normal pulses.      Comments: HR 68  Pulmonary:      Effort: Pulmonary effort is normal. No respiratory distress.   Musculoskeletal:      Tenderness to the left lateral malleolus.  Range of motion limited by pain. Distal affected extremity demonstrates intact sensation, motor, cap refill less than 2 seconds and 2+ pulses, warm and well perfused, no signs of tendon rupture, no crepitus on palpation.  No midline C/T/L-spine tenderness to palpation, step-offs or deformities     Cervical back: Normal range of motion. No rigidity.   Skin:     General: Skin is warm and dry.      Capillary Refill: Capillary refill takes less than 2 seconds.   Neurological:      Mental Status: Alert.  Intact sensation and movement in the bilateral lower extremities      RADIOLOGY/PROCEDURES   I have independently interpreted the diagnostic imaging associated with this visit and am waiting the final reading from the radiologist.   My preliminary interpretation is as follows: Talus fracture of the left foot    Radiologist interpretation:  DX-ANKLE 3+ VIEWS LEFT   Final Result      New fracture of the posterior talus.      CT-FOOT W/O PLUS RECONS LEFT    (Results Pending)       COURSE & MEDICAL DECISION MAKING    ASSESSMENT, COURSE AND PLAN  Care Narrative: No midline tenderness to palpation, no step-offs or deformities, fracture vs dislocation are inconsistent with patient presentation at this time. Patient denies bowel or bladder incontinence or urinary retention, saddle anesthesia. Patient has intact motor in the bilateral lower extremities and sensation to light touch is grossly intact in the distal bilateral lower extremities. Patient without fever, swelling, erythema  over the spine. Cauda equina vs epidural abscess vs spinal cord compression are inconsistent with patient presentation at this time.     Electronically signed by: Cirilo Looney M.D., 10/31/2024 12:18 AM    Dr. Rosa has been consulted for a talus fracture and recommends outpatient follow-up in 10 days.  Patient cannot get around at home due to weight, no social support systems, only has a walker.  Patient is unable to use crutches.  Patient will be evaluated by PT OT in the morning.  Patient consulted to hospital medicine service.  Disposition to ED observation pending PT OT evaluation and disposition with social work.    This dictation has been created using voice recognition software. I am continuously working with the software to minimize the number of voice recognition errors and I have made every attempt to manually correct the errors within my dictation. However errors  related to this voice recognition software may still exist and should be interpreted within the appropriate context.     Electronically signed by: Cirilo Looney M.D., 10/31/2024 1:21 AM        ED OBS: Yes; I am placing the patient in to an observation status due to a diagnostic uncertainty as well as therapeutic intensity. Patient placed in observation status at 11:00 PM, 10/30/2024.     Observation plan is as follows: pending PT/OT eval        FINAL DIAGNOSIS  1. Closed displaced fracture of left talus, unspecified portion of talus, initial encounter         Electronically signed by: Cirilo Looney M.D., 10/31/2024 12:11 AM

## 2024-10-31 NOTE — DISCHARGE PLANNING
[facility] Transitional Care Coordination     Referral from: Dr. Quiroga  Insurance Provider on Facesheet: [employer]S  Potential Rehab Diagnosis: left talus fx     Chart review indicates patient may have on going medical management and may have therapy needs to possibly meet inpatient rehab facility criteria with the goal of returning to community.     D/C support: relative     Physiatry consultation pended per protocol.   Would appreciate therapy evaluations as appropriate  TCC will follow      Thank you for the referral.

## 2024-10-31 NOTE — CONSULTS
Physical Medicine and Rehabilitation Consultation          Date of initial consultation: 10/31/2024  Consulting provider:  Nader Quiroga M.D.   Reason for consultation: assess for acute inpatient rehab appropriateness  LOS: 0 Day(s)    Chief complaint: Left ankle fracture    HPI: The patient is a 49 y.o. right hand dominant female with a past medical history of atrial fibrillation, hyperlipidemia, asthma, morbid obesity;  who presented on 10/30/2024 11:06 PM with left ankle pain.  Patient rolled her left ankle while bowling 3 days prior to presentation, she was unable to tolerate pain at home and presented to the ED for evaluation where she was found to have a left talus fracture.  Patient was evaluated by orthopedic surgery who recommended operative management patient has now been admitted for fixation    The patient currently reports left ankle pain.  Patient reports he lives at a group home, where she gets assistance with ADLs, showering but walks on her own with a four-wheel walker.  She was doing well and was getting ready to transition back to independent living but now will need continued support at her [facility].    ROS  Pertinent positives are mentioned in the HPI, all others reviewed and are negative.    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 As noted on the prior conventional radiographs, there is a vertically oriented fracture of the talus. This appears subacute to chronic.  2. Osteopenia.  3. Osteoarthrosis.  4. Muscular atrophy.    PROCEDURES:  Pending fixation    PMH:  Past Medical History:   Diagnosis Date    A-fib ([facility])     MONI (acute kidney injury) ([facility]) 08/09/2023    Asthma     Bipolar 2 disorder ([facility])     Chickenpox     Chronic obstructive  pulmonary disease (HCC)     Congestive heart failure (HCC)     Hypertension     On home oxygen therapy     Other psychological stress     Schizophrenic disorder (HCC)     Yeast infection of the skin 04/01/2021       PSH:  Past Surgical History:   Procedure Laterality Date    CHOLECYSTECTOMY      COLECTOMY      HYSTERECTOMY LAPAROSCOPY      KNEE ARTHROSCOPY Left        FHX:  Family History   Problem Relation Age of Onset    No Known Problems Mother     Cancer Sister        Medications:  Current Facility-Administered Medications   Medication Dose    mometasone-formoterol (Dulera) 200-5 MCG/ACT inhaler 1 Puff  1 Puff    apixaban (Eliquis) tablet 5 mg  5 mg    ARIPiprazole (Abilify) tablet 30 mg  30 mg    atorvastatin (Lipitor) tablet 20 mg  20 mg    cyclobenzaprine (Flexeril) tablet 10 mg  10 mg    gabapentin (Neurontin) capsule 300 mg  300 mg    metoprolol SR (Toprol XL) tablet 25 mg  25 mg    acetaminophen (Tylenol) tablet 650 mg  650 mg    labetalol (Normodyne/Trandate) injection 10 mg  10 mg    ondansetron (Zofran) syringe/vial injection 4 mg  4 mg    ondansetron (Zofran ODT) dispertab 4 mg  4 mg    promethazine (Phenergan) tablet 12.5-25 mg  12.5-25 mg    promethazine (Phenergan) suppository 12.5-25 mg  12.5-25 mg    prochlorperazine (Compazine) injection 5-10 mg  5-10 mg    Pharmacy Consult Request ...Pain Management Review 1 Each  1 Each    oxyCODONE immediate-release (Roxicodone) tablet 2.5 mg  2.5 mg    Or    oxyCODONE immediate-release (Roxicodone) tablet 5 mg  5 mg    Or    HYDROmorphone (Dilaudid) injection 0.25 mg  0.25 mg       Allergies:  Allergies   Allergen Reactions    Penicillin G Rash and Itching     pt reports that she gets a rash all over her body and gets itchy    Amoxicillin Rash and Itching     Tolerates cephalosporins, pt reports that she gets a rash all over her body and gets itchy         Physical Exam:  Vitals: /77   Pulse (!) 57   Temp 36 °C (96.8 °F) (Temporal)   Resp 17   Ht  "1.549 m (5' 1\")   Wt 124 kg (273 lb)   SpO2 94%   Gen: NAD  Head: NC/AT  Eyes/ Nose/ Mouth: PERRLA, moist mucous membranes  Cardio: RRR, good distal perfusion, warm extremities  Pulm: normal respiratory effort, no cyanosis   Abd: Soft NTND, negative borborygmi   Ext: No peripheral edema. No calf tenderness. No clubbing.    Mental status:  A&Ox4 (person, place, date, situation) answers questions appropriately follows commands  Speech: fluent, no aphasia or dysarthria    Motor:      Upper Extremity  Myotome R L   Shoulder flexion C5 5 5   Elbow flexion C5 5 5   Wrist extension C6 5 5   Elbow extension C7 5 5   Finger flexion C8 5 5   Finger abduction T1 5 5     Lower Extremity Myotome R L   Hip flexion L2 4/5 4/5   Knee extension L3 4/5 5   Ankle dorsiflexion L4 Splint 5   Toe extension L5 Splint 5   Ankle plantarflexion S1 Splint 5     Labs: Reviewed and significant for   No results for input(s): \"RBC\", \"HEMOGLOBIN\", \"HEMATOCRIT\", \"PLATELETCT\", \"PROTHROMBTM\", \"APTT\", \"INR\", \"IRON\", \"FERRITIN\", \"TOTIRONBC\" in the last 72 hours.      No results found for this or any previous visit (from the past 24 hour(s)).      ASSESSMENT:  Patient is a 49 y.o. female admitted with left talus fracture     Rehabilitation: Impaired ADLs and mobility  Barriers to transfer include: Insurance authorization, TCCs to verify disposition, medical clearance and bed availability. All cases are subject to administrative review.       Disposition recommendations:  -Recommend SNF placement on discharge.  Patient has a left ankle fracture, no other acute comorbid conditions that would require daily physician visits to necessitate IPR.  Additionally patient lives in a group home, and will need to be near independent to return there.    -PMR is available to rereview case after surgical fixation if functionality changes  - Otherwise, PMR will sign off    Medical Complexity:      Left ankle fracture  -Delayed presentation, she rolled her ankle while " guerda  -Found to have left talus fracture  -NWB LLE  -Pending surgical fixation  -Continue PT OT while in house  -Plan for SNF placement on discharge    Atrial fibrillation  -Eliquis 5 mg twice daily  -Metoprolol 25 mg daily    Hyperlipidemia  -Atorvastatin 20 mg nightly    Major depressive disorder  -Abilify 30 mg    Neuropathic pain  -Gabapentin 300 mg 3 times daily    DVT PPX: SCDs      Thank you for allowing us to participate in the care of this patient.     Patient was seen for >80 minutes on unit/floor of which > 50% of time was spent on counseling and coordination of care regarding the above, including prognosis, risk reduction, benefits of treatment, and options for next stage of care.    Steve Powell, DO   Physical Medicine and Rehabilitation     Please note that this dictation was created using voice recognition software. I have made every reasonable attempt to correct obvious errors, but there may be errors of grammar and possibly content that I did not discover before finalizing the note.

## 2024-10-31 NOTE — PROGRESS NOTES
Patient's home medications have been reviewed by the pharmacy team.     Past Medical History:   Diagnosis Date    A-fib ([facility])     MONI (acute kidney injury) ([facility]) 08/09/2023    Asthma     Bipolar 2 disorder ([facility])     Chickenpox     Chronic obstructive pulmonary disease ([facility])     Congestive heart failure ([facility])     Hypertension     On home oxygen therapy     Other psychological stress     Schizophrenic disorder ([facility])     Yeast infection of the skin 04/01/2021       Patient's Medications   New Prescriptions    No medications on file   Previous Medications    ADVAIR DISKUS 100-50 MCG/ACT AEROSOL POWDER, BREATH ACTIVATED    Inhale 1 Puff 2 times a day.    APIXABAN (ELIQUIS) 5MG TAB    Take 1 Tablet by mouth 2 times a day.    ARIPIPRAZOLE (ABILIFY) 30 MG TABLET    Take 1 Tablet by mouth every morning. Indications: Major Depressive Disorder    ATORVASTATIN (LIPITOR) 20 MG TAB    Take 1 Tablet by mouth at bedtime.    CYCLOBENZAPRINE (FLEXERIL) 10 MG TAB    Take 1 Tablet by mouth 3 times a day as needed for Moderate Pain or Muscle Spasms.    CYCLOBENZAPRINE (FLEXERIL) 10 MG TAB    Take 1 Tablet by mouth 3 times a day as needed (pain).    FLUTICASONE (FLONASE) 50 MCG/ACT NASAL SPRAY    Administer 2 Sprays into each nostril every morning.    GABAPENTIN (NEURONTIN) 300 MG CAP    Take 300 mg by mouth 3 times a day.    HOME CARE OXYGEN    Inhale 2 L/min every evening. DME = Preferred    HYDROXYZINE HCL (ATARAX) 25 MG TAB        LIDOCAINE (LIDODERM) 5 % PATCH    Place 1 Patch on the skin every 12 hours.    LIDOCAINE (LMX) 4 % CREAM    1 application as needed Externally Three times a day for 30 days    MECLIZINE (ANTIVERT) 25 MG TAB    Take 1 Tablet by mouth 3 times a day as needed for Vertigo or Dizziness.    METOPROLOL SR (TOPROL XL) 25 MG TABLET SR 24 HR    Take 25 mg by mouth every day.    MONTELUKAST (SINGULAIR) 10 MG TAB    Take 1 Tablet by mouth every evening.    NAPROXEN (NAPROSYN) 500 MG TAB    Take 1 Tablet by mouth 2  times a day with meals.    NICOTINE (NICODERM) 14 MG/24HR PATCH 24 HR    Place 1 Patch on the skin every 24 hours.    NICOTINE (NICODERM) 7 MG/24HR PATCH 24 HR        OMEPRAZOLE (PRILOSEC) 20 MG DELAYED-RELEASE CAPSULE    Take 1 Capsule by mouth 2 times a day.    ONDANSETRON (ZOFRAN ODT) 4 MG TABLET DISPERSIBLE    Dissolve 1 Tablet by mouth every 6 hours as needed for Nausea/Vomiting.    THIAMINE HCL (VITAMIN B1) 100 MG TAB       Modified Medications    No medications on file   Discontinued Medications    No medications on file          A:  Medications do not appear to be contributing to current complaints.     P:    No recommendations at this time. Home medications have been reordered as appropriate.    Tejinder Wright, HildaD

## 2024-10-31 NOTE — DISCHARGE SUMMARY
"  ED Observation Discharge Summary    Patient:Lorene Haro  Patient : 1975  Patient MRN: [id number]  Patient PCP: Lili Reddy M.D.    Admit Date: 10/30/2024  Discharge Date and Time: 10/31/24 11:39 AM  Discharge Diagnosis: Talus fracture  Discharge Attending: Rodney Otero M.D.  Discharge Service: ED Observation    ED Course  Lorene is a 49 y.o. female who was evaluated at [facility] with injury to her ankle and ultimately found have a talus fracture.  At this time the patient is in a splint.  She is awake and alert.  I spoke to case management and there are times to place the patient.  Ultimately I called back the orthopedic surgeon Dr. webb and who feels the patient will benefit from inpatient stay and fixation of the ankle.    Discharge Exam:  BP 97/52   Pulse (!) 56   Temp 36.2 °C (97.1 °F) (Temporal)   Resp 18   Ht 1.549 m (5' 1\")   Wt 124 kg (273 lb)   LMP  (LMP Unknown)   SpO2 97%   BMI 51.58 kg/m² .    Constitutional: Awake and alert. Nontoxic  HENT:  Grossly normal  Eyes: Grossly normal  Neck: Normal range of motion  Cardiovascular: Normal heart rate   Thorax & Lungs: No respiratory distress  Abdomen: Nontender  Skin:  No pathologic rash.   Extremities: Well perfused  Psychiatric: Affect normal    Labs  Results for orders placed or performed during the hospital encounter of 10/22/24   EKG    Collection Time: 10/22/24  2:51 PM   Result Value Ref Range    Report       [facility] Emergency Dept.    Test Date:  2024-10-22  Pt Name:    LORENE HARO                Department: ER  MRN:        [id number]                      Room:       [facility]  Gender:     Female                       Technician: [id number]  :        1975                   Requested By:ER TRIAGE PROTOCOL  Order #:    [id number]                    Reading MD:    Measurements  Intervals                                Axis  Rate:       63                           P:          38  MS:         130    "                       QRS:        57  QRSD:       115                          T:          4  QT:         392  QTc:        402    Interpretive Statements  Sinus rhythm  Atrial premature complexes  Incomplete right bundle branch block  Low voltage, precordial leads  Borderline ST elevation, lateral leads  Compared to ECG 10/16/2024 18:52:48  Atrial premature complex(es) now present  Low QRS voltage now present  ST (T wave) deviation now present     CBC with Differential    Collection Time: 10/22/24  3:40 PM   Result Value Ref Range    WBC 11.7 (H) 4.8 - 10.8 K/uL    RBC 4.42 4.20 - 5.40 M/uL    Hemoglobin 11.9 (L) 12.0 - 16.0 g/dL    Hematocrit 38.1 37.0 - 47.0 %    MCV 86.2 81.4 - 97.8 fL    MCH 26.9 (L) 27.0 - 33.0 pg    MCHC 31.2 (L) 32.2 - 35.5 g/dL    RDW 53.0 (H) 35.9 - 50.0 fL    Platelet Count 234 164 - 446 K/uL    MPV 11.4 9.0 - 12.9 fL    Neutrophils-Polys 74.70 (H) 44.00 - 72.00 %    Lymphocytes 17.70 (L) 22.00 - 41.00 %    Monocytes 6.20 0.00 - 13.40 %    Eosinophils 0.90 0.00 - 6.90 %    Basophils 0.20 0.00 - 1.80 %    Immature Granulocytes 0.30 0.00 - 0.90 %    Nucleated RBC 0.00 0.00 - 0.20 /100 WBC    Neutrophils (Absolute) 8.71 (H) 1.82 - 7.42 K/uL    Lymphs (Absolute) 2.07 1.00 - 4.80 K/uL    Monos (Absolute) 0.72 0.00 - 0.85 K/uL    Eos (Absolute) 0.11 0.00 - 0.51 K/uL    Baso (Absolute) 0.02 0.00 - 0.12 K/uL    Immature Granulocytes (abs) 0.04 0.00 - 0.11 K/uL    NRBC (Absolute) 0.00 K/uL   Complete Metabolic Panel (CMP)    Collection Time: 10/22/24  3:40 PM   Result Value Ref Range    Sodium 141 135 - 145 mmol/L    Potassium 4.2 3.6 - 5.5 mmol/L    Chloride 106 96 - 112 mmol/L    Co2 26 20 - 33 mmol/L    Anion Gap 9.0 7.0 - 16.0    Glucose 104 (H) 65 - 99 mg/dL    Bun 16 8 - 22 mg/dL    Creatinine 0.96 0.50 - 1.40 mg/dL    Calcium 9.4 8.5 - 10.5 mg/dL    Correct Calcium 9.3 8.5 - 10.5 mg/dL    AST(SGOT) 13 12 - 45 U/L    ALT(SGPT) 13 2 - 50 U/L    Alkaline Phosphatase 133 (H) 30 - 99 U/L    Total  Bilirubin 0.5 0.1 - 1.5 mg/dL    Albumin 4.1 3.2 - 4.9 g/dL    Total Protein 6.9 6.0 - 8.2 g/dL    Globulin 2.8 1.9 - 3.5 g/dL    A-G Ratio 1.5 g/dL   proBrain Natriuretic Peptide, NT (BNP)    Collection Time: 10/22/24  3:40 PM   Result Value Ref Range    NT-proBNP 283 (H) 0 - 125 pg/mL   Troponins NOW    Collection Time: 10/22/24  3:40 PM   Result Value Ref Range    Troponin T 33 (H) 6 - 19 ng/L   ESTIMATED GFR    Collection Time: 10/22/24  3:40 PM   Result Value Ref Range    GFR (CKD-EPI) 72 >60 mL/min/1.73 m 2   Troponins in two (2) hours    Collection Time: 10/22/24  5:19 PM   Result Value Ref Range    Troponin T 33 (H) 6 - 19 ng/L     *Note: Due to a large number of results and/or encounters for the requested time period, some results have not been displayed. A complete set of results can be found in Results Review.       Radiology  CT-FOOT W/O PLUS RECONS LEFT   Final Result         1. As noted on the prior conventional radiographs, there is a vertically oriented fracture of the talus. This appears subacute to chronic.   2. Osteopenia.   3. Osteoarthrosis.   4. Muscular atrophy.      DX-ANKLE 3+ VIEWS LEFT   Final Result      New fracture of the posterior talus.          Medications:   New Prescriptions    No medications on file       My final assessment includes L talus fracture  Upon Reevaluation, the patient's condition has: not improved; and will be escalated to hospitalization.    Patient discharged from ED Observation status at 11:40 AM  (Time) 10/31 (Date).     Total time spent on this ED Observation discharge encounter is < 30 Minutes    Electronically signed by: Rodney Otero M.D., 10/31/2024 11:39 AM

## 2024-10-31 NOTE — PROGRESS NOTES
4 Eyes Skin Assessment Completed by JC Shoemaker and hansa RN.    Head WDL  Ears WDL  Nose WDL  Mouth WDL  Neck WDL  Breast/Chest WDL  Shoulder Blades WDL  Spine WDL  (R) Arm/Elbow/Hand WDL  (L) Arm/Elbow/Hand WDL  Abdomen WDL  Groin WDL  Scrotum/Coccyx/Buttocks Redness and Blanching  (R) Leg Redness and Blanching, discoloration  (L) Leg Swelling fx, split in place  (R) Heel/Foot/Toe WDL  (L) Heel/Foot/Toe ARAM heel and foot r/t DIP          Devices In Places Blood Pressure Cuff, Pulse Ox, and SCD's, purewick      Interventions In Place TAP System and Pillows    Possible Skin Injury No    Pictures Uploaded Into Epic N/A  Wound Consult Placed N/A  RN Wound Prevention Protocol Ordered No

## 2024-10-31 NOTE — DISCHARGE PLANNING
Physiatry consult forwarded per protocol  TCC remains following     Per PMR patient does not meet criteria for IPR at this time.  Thank you for the consult   Please reach back out if medical management is requested.  TCC no longer following

## 2024-10-31 NOTE — DISCHARGE PLANNING
SW met with pt at bedside due to ED Observation disposition. Pt states she hurt her foot 3 days ago while bowling. Pt states she lives at [facility] where the staff assists her with her ADLs. Pt uses 2L O2 at night and uses a walker at home. Per pt there is one step to get into the home and no other steps.     SW discussed pending PT/OT evaluations and that placement will depend on what PT/OT recommend. Pt is agreeable to post-acute placement or home health if needed.       SW will remain available for PT/OT recommendations.

## 2024-10-31 NOTE — CARE PLAN
The patient is Stable - Low risk of patient condition declining or worsening    Shift Goals  Clinical Goals: safety, pain control, comfort  Patient Goals: pain control, rest  Family Goals: NA    Progress made toward(s) clinical / shift goals:  yes      Problem: Pain - Standard  Goal: Alleviation of pain or a reduction in pain to the patient’s comfort goal  Outcome: Progressing  Note: Paitient educated on 0 to 10 pain scale. Pain managed with pharmacologic and non-pharmacological interventions. See MAR. Pt verbilized understanding of interventions.      Problem: Knowledge Deficit - Standard  Goal: Patient and family/care givers will demonstrate understanding of plan of care, disease process/condition, diagnostic tests and medications  Outcome: Progressing

## 2024-10-31 NOTE — PROGRESS NOTES
"ED Observation Progress Note    Date of Service: 10/31/24    Interval History and Interventions  Patient splinted overnight.  No acute events.  Pending PT and OT evaluation and placement.    Physical Exam  /59   Pulse 60   Temp 36.1 °C (97 °F) (Temporal)   Resp 18   Ht 1.549 m (5' 1\")   Wt 124 kg (273 lb)   LMP  (LMP Unknown)   SpO2 95%   BMI 51.58 kg/m² .    Constitutional: Awake and alert. Nontoxic  HENT:  Grossly normal  Eyes: Grossly normal  Neck: Normal range of motion  Cardiovascular: Normal heart rate   Thorax & Lungs: No respiratory distress  Abdomen: Nontender  Skin:  No pathologic rash.   Extremities: Splint in place.  Psychiatric: Affect normal    Labs  none    Radiology  CT-FOOT W/O PLUS RECONS LEFT   Final Result         1. As noted on the prior conventional radiographs, there is a vertically oriented fracture of the talus. This appears subacute to chronic.   2. Osteopenia.   3. Osteoarthrosis.   4. Muscular atrophy.      DX-ANKLE 3+ VIEWS LEFT   Final Result      New fracture of the posterior talus.          Problem List  1.  Left talus fracture    Electronically signed by: Rodney Otero M.D., 10/31/2024 4:46 AM          "

## 2024-10-31 NOTE — CONSULTS
Hospital Medicine Consultation    Date of Service  10/31/2024    Referring Physician  TETE Reyes.*    Consulting Physician  Nader Quiroga M.D.    Reason for Consultation  Talus fracture    History of Presenting Illness  49 y.o. female who presented 10/30/2024 with left ankle pain.  Patient went bowling about 3 days ago and rolled her left ankle while she was bowling.  She did not fall.  Shortly after, she began to have progressive worsening left ankle pain and had difficulties ambulating.  She then noticed that it began to be bruised and decided to come into the ER for further evaluation.    At [facility], CT foot showing vertically oriented fracture of the talus, appears subacute to chronic.  Orthopedic surgery consulted, recommends outpatient surgery in about 10 days.  Medicine consulted for medication reconciliation.    Review of Systems  Review of Systems   Constitutional: Negative.    HENT: Negative.     Eyes: Negative.    Respiratory: Negative.     Cardiovascular: Negative.    Gastrointestinal: Negative.    Genitourinary: Negative.    Musculoskeletal:  Positive for falls and joint pain.   Skin: Negative.    Neurological: Negative.    Endo/Heme/Allergies: Negative.    Psychiatric/Behavioral: Negative.         Past Medical History   has a past medical history of A-fib ([facility]), MONI (acute kidney injury) ([facility]) (08/09/2023), Asthma, Bipolar 2 disorder ([facility]), Chickenpox, Chronic obstructive pulmonary disease ([facility]), Congestive heart failure ([facility]), Hypertension, On home oxygen therapy, Other psychological stress, Schizophrenic disorder ([facility]), and Yeast infection of the skin (04/01/2021).    She has no past medical history of CAD (coronary artery disease), Cancer ([facility]), Diabetes, Liver disease, Seizure disorder ([facility]), or Stroke ([facility]).    Surgical History   has a past surgical history that includes hysterectomy laparoscopy; colectomy; cholecystectomy; and knee arthroscopy (Left).    Family History  family  history includes Cancer in her sister; No Known Problems in her mother.    Social History   reports that she quit smoking about 2 years ago. Her smoking use included cigarettes. She has never used smokeless tobacco. She reports that she does not currently use alcohol. She reports that she does not use drugs.    Medications  Prior to Admission Medications   Prescriptions Last Dose Informant Patient Reported? Taking?   ADVAIR DISKUS 100-50 MCG/ACT AEROSOL POWDER, BREATH ACTIVATED  MAR from Other Facility No No   Sig: Inhale 1 Puff 2 times a day.   Home Care Oxygen  Patient Yes No   Sig: Inhale 2 L/min every evening. DME = Preferred   Thiamine HCl (VITAMIN B1) 100 MG Tab   Yes No   apixaban (ELIQUIS) 5mg Tab  MAR from Other Facility No No   Sig: Take 1 Tablet by mouth 2 times a day.   aripiprazole (ABILIFY) 30 MG tablet  MAR from Other Facility No No   Sig: Take 1 Tablet by mouth every morning. Indications: Major Depressive Disorder   atorvastatin (LIPITOR) 20 MG Tab  MAR from Other Facility No No   Sig: Take 1 Tablet by mouth at bedtime.   cyclobenzaprine (FLEXERIL) 10 mg Tab   No No   Sig: Take 1 Tablet by mouth 3 times a day as needed for Moderate Pain or Muscle Spasms.   cyclobenzaprine (FLEXERIL) 10 mg Tab   No No   Sig: Take 1 Tablet by mouth 3 times a day as needed (pain).   fluticasone (FLONASE) 50 MCG/ACT nasal spray  MAR from Other Facility Yes No   Sig: Administer 2 Sprays into each nostril every morning.   gabapentin (NEURONTIN) 300 MG Cap  MAR from Other Facility Yes No   Sig: Take 300 mg by mouth 3 times a day.   hydrOXYzine HCl (ATARAX) 25 MG Tab   Yes No   lidocaine (LIDODERM) 5 % Patch   Yes No   Sig: Place 1 Patch on the skin every 12 hours.   lidocaine (LMX) 4 % Cream   Yes No   Si application as needed Externally Three times a day for 30 days   meclizine (ANTIVERT) 25 MG Tab   No No   Sig: Take 1 Tablet by mouth 3 times a day as needed for Vertigo or Dizziness.   metoprolol SR (TOPROL XL) 25 MG  TABLET SR 24 HR  MAR from Other Facility Yes No   Sig: Take 25 mg by mouth every day.   montelukast (SINGULAIR) 10 MG Tab  MAR from Other Facility No No   Sig: Take 1 Tablet by mouth every evening.   naproxen (NAPROSYN) 500 MG Tab   No No   Sig: Take 1 Tablet by mouth 2 times a day with meals.   nicotine (NICODERM) 14 MG/24HR PATCH 24 HR  MAR from Other Facility Yes No   Sig: Place 1 Patch on the skin every 24 hours.   nicotine (NICODERM) 7 MG/24HR PATCH 24 HR   Yes No   omeprazole (PRILOSEC) 20 MG delayed-release capsule   No No   Sig: Take 1 Capsule by mouth 2 times a day.   ondansetron (ZOFRAN ODT) 4 MG TABLET DISPERSIBLE   No No   Sig: Dissolve 1 Tablet by mouth every 6 hours as needed for Nausea/Vomiting.      Facility-Administered Medications: None       Allergies  Allergies   Allergen Reactions    Penicillin G Rash and Itching     pt reports that she gets a rash all over her body and gets itchy    Amoxicillin Rash and Itching     Tolerates cephalosporins, pt reports that she gets a rash all over her body and gets itchy       Physical Exam  Temp:  [35.9 °C (96.7 °F)-36.1 °C (97 °F)] 36.1 °C (97 °F)  Pulse:  [60-68] 60  Resp:  [15-18] 18  BP: (128-157)/(59-62) 128/59  SpO2:  [95 %-97 %] 95 %    Physical Exam  Constitutional:       Appearance: Normal appearance. She is obese.   HENT:      Head: Normocephalic.      Nose: Nose normal.      Mouth/Throat:      Mouth: Mucous membranes are moist.   Eyes:      Pupils: Pupils are equal, round, and reactive to light.   Cardiovascular:      Rate and Rhythm: Normal rate and regular rhythm.      Pulses: Normal pulses.   Pulmonary:      Effort: Pulmonary effort is normal.      Breath sounds: Normal breath sounds.   Abdominal:      General: Abdomen is flat. Bowel sounds are normal.      Palpations: Abdomen is soft.   Musculoskeletal:      Cervical back: Neck supple.      Comments: Left foot wrapped, range of motion limited due to pain     Skin:     General: Skin is warm.    Neurological:      Mental Status: She is alert and oriented to person, place, and time. Mental status is at baseline.   Psychiatric:         Mood and Affect: Mood normal.         Behavior: Behavior normal.         Thought Content: Thought content normal.         Judgment: Judgment normal.         Fluids      Laboratory                          Imaging  CT-FOOT W/O PLUS RECONS LEFT   Final Result         1. As noted on the prior conventional radiographs, there is a vertically oriented fracture of the talus. This appears subacute to chronic.   2. Osteopenia.   3. Osteoarthrosis.   4. Muscular atrophy.      DX-ANKLE 3+ VIEWS LEFT   Final Result      New fracture of the posterior talus.          Assessment/Plan  * Talus fracture  Assessment & Plan  Noted to have a subacute talus fracture on her left foot  Orthopedic surgery consulted, recommends outpatient surgery in about 10 days  Patient unable to be discharged safely as she has no support system.  Does not meet criteria for admission  Pain medication  PT OT  Rehab  Lovenox ppx    Asthma-COPD overlap syndrome (HCC)- (present on admission)  Assessment & Plan  Continue home inhalers  No longer smoking    Hyperlipidemia- (present on admission)  Assessment & Plan  Lipitor    AF (atrial fibrillation) (HCC)- (present on admission)  Assessment & Plan  Continue metoprolol and Eliquis  Patient instructed to hold Eliquis 48 hours before surgery    Morbid obesity (HCC)- (present on admission)  Assessment & Plan  BMI 51

## 2024-10-31 NOTE — ED NOTES
Medication history reviewed with Caregiver from [facility] ([phone]) over the phone    Med rec is complete per Caregiver reporting    Allergies reviewed historically.    PT was on Doxycycline 100mg 5 day course started 10/16/2024. Last dose was 10/21/2024.    Patient is taking Eliquis 5mg. Last dose was 10/31/2024 at 0700.    Preferred pharmacy for this visit - [facility] ([phone])

## 2024-10-31 NOTE — ASSESSMENT & PLAN NOTE
CT imaging showing a subacute fracture of the left talus bone  Orthopedist has changed her mind and wanted patient admitted for consideration of surgical intervention  Vitamin D and calcium have been ordered   oxycodone as needed for pain  PT/OT status post surgery  Discussed with patient discussed with surgery recommend ice packing on and off to help with swelling and pain  Weightbearing per postsurgical recommendations from Ortho

## 2024-10-31 NOTE — THERAPY
Physical Therapy   Initial Evaluation     Patient Name: Lorene Haro  Age:  49 y.o., Sex:  female  Medical Record #: [id number]  Today's Date: 10/31/2024     Precautions  Precautions: Fall Risk;Non Weight Bearing Left Lower Extremity    Assessment  Pt presents with impaired activity tolerance, functional mobility, dynamic balance and strength associated with acute on subacute complaint of left ankle pain in setting of AF, morbid obesity, cirrhosis and schizophrenia. Found to have vertical oriented fx of talus subacute to chronic fx; interestingly appears pt has been coming into ED for left LE pain a few times dating back 09/29/24 without imaging noted. Pt is very motivated and participatory however requiring moderate assist for bed mobility and standing to maintain NWB. She would need a w/c if to dc at this level but reports her group home is not w/c accessible (would need to confirm) also hypotensive with possible presyncope event at EOB which would be probable as she has a hx of this and BP in supine and seated EOB 90s/50s; currently would need moderate assist for transfers and would need SNF vs medical transport to her group home with confirmed 24/7 care/ability prior to sx; will follow.    Plan    Physical Therapy Initial Treatment Plan   Treatment Plan : Bed Mobility, Equipment, Gait Training, Family / Caregiver Training, Neuro Re-Education / Balance, Self Care / Home Evaluation, Stair Training, Therapeutic Activities, Therapeutic Exercise, Manual Therapy  Treatment Frequency: 4 Times per Week  Duration: Until Therapy Goals Met    DC Equipment Recommendations: Unable to determine at this time  Discharge Recommendations: would need SNF in current condition; if has to return home in current condition would need medical transport home to hospital bed, she does have 24/7 support at group home but unclear how much physical lifting they would provide        Abridged Subjective/Objective     10/31/24 0955   Prior  Living Situation   Prior Services Intermittent Physical Support for ADL Per Service   Housing / Facility 1 Story House;Group Home   Bathroom Set up Bathtub / Shower Combination   Equipment Owned 4-Wheel Walker;Grab Bar(s) In Tub / Shower   Lives with - Patient's Self Care Capacity Attendant / Paid Care Giver   Comments lives at group home, has assist with everything if needed   Prior Level of Functional Mobility   Bed Mobility Independent   Transfer Status Independent   Ambulation Independent   Ambulation Distance to tolerance   Assistive Devices Used 4-Wheel Walker   Cognition    Cognition / Consciousness X   Level of Consciousness Alert   Ability To Follow Commands 1 Step   New Learning Impaired   Comments very pleasant and ciooperative; motivated   Passive ROM Lower Body   Passive ROM Lower Body X   Comments left ankle in cast assumed NWB   Strength Lower Body   Lower Body Strength  X   Comments right LE grossly 4/5, left 3+/5 reporting pain with SLR (pt performing in bed, discussed not performing until sx)   Sensation Lower Body   Lower Extremity Sensation   X   Comments tingling/numbness in toes/ankles   Balance Assessment   Sitting Balance (Static) Fair   Sitting Balance (Dynamic) Fair   Standing Balance (Static) Poor +   Weight Shift Sitting Fair   Comments unable to shift weight, stood  with FWW   Bed Mobility    Supine to Sit Moderate Assist   Sit to Supine Moderate Assist   Gait Analysis   Gait Level Of Assist Unable to Participate  (2/2 inability to offload to FWW cannot tricep dip)   Functional Mobility   Sit to Stand Minimal Assist  (with FWW and therapist holding foot off ground on left)   Bed, Chair, Wheelchair Transfer Minimal Assist   Edema / Skin Assessment   Edema / Skin  X   Comments no color changes in toes   Patient / Family Goals    Patient / Family Goal #1 to improve pain   Short Term Goals    Short Term Goal # 1 Pt will perform supine<>sit from flat HOB/no raiing with supervision within 6  visits to ensure independent mobility at home.   Short Term Goal # 2 Pt will perform sit<>stand with FWW and supervision wiithin 6 visits to ensure independent mobility at home.   Short Term Goal # 3 Pt will ambulate x 150ft with 4WW and supervision within 6 visits to ensure independent mobility at home.   Short Term Goal # 4 Pt will ascend/descend 1 step with FWW and supervision within 6 visits to ensure independent mobility at home.   Education Group   Role of Physical Therapist Patient Response Patient;Acceptance;Explanation;Demonstration;Verbal Demonstration;Action Demonstration   Use of Assistive Device Patient Response Patient;Acceptance;Explanation;Demonstration;Verbal Demonstration;Action Demonstration;Reinforcement Needed   Exercises - Supine Patient Response Patient;Acceptance;Explanation;Demonstration;Verbal Demonstration;Action Demonstration;Reinforcement Needed   Additional Comments hypoxia and BP drop

## 2024-10-31 NOTE — ED NOTES
Bedside report received from off going RN/tech: Shanon GREENE, assumed care of patient.  POC discussed with patient. Call light within reach, all needs addressed at this time.       Fall risk interventions in place: Move the patient closer to the nurse's station, Patient's personal possessions are with in their safe reach, Place fall risk sign on patient's door, Keep floor surfaces clean and dry, and Accompanied to restroom (all applicable per [facility] Fall risk assessment)   Continuous monitoring: Pulse Ox or Blood Pressure  IVF/IV medications: Not Applicable   Oxygen: Room Air  Bedside sitter: Not Applicable   Isolation: Not Applicable    POC to have PT/OT eval and dispo.

## 2024-11-01 PROCEDURE — A9270 NON-COVERED ITEM OR SERVICE: HCPCS | Performed by: NURSE PRACTITIONER

## 2024-11-01 PROCEDURE — 99233 SBSQ HOSP IP/OBS HIGH 50: CPT | Performed by: INTERNAL MEDICINE

## 2024-11-01 PROCEDURE — A9270 NON-COVERED ITEM OR SERVICE: HCPCS | Performed by: STUDENT IN AN ORGANIZED HEALTH CARE EDUCATION/TRAINING PROGRAM

## 2024-11-01 PROCEDURE — 700102 HCHG RX REV CODE 250 W/ 637 OVERRIDE(OP): Performed by: HOSPITALIST

## 2024-11-01 PROCEDURE — 700102 HCHG RX REV CODE 250 W/ 637 OVERRIDE(OP): Performed by: STUDENT IN AN ORGANIZED HEALTH CARE EDUCATION/TRAINING PROGRAM

## 2024-11-01 PROCEDURE — 700105 HCHG RX REV CODE 258: Performed by: NURSE PRACTITIONER

## 2024-11-01 PROCEDURE — A9270 NON-COVERED ITEM OR SERVICE: HCPCS | Performed by: HOSPITALIST

## 2024-11-01 PROCEDURE — 770001 HCHG ROOM/CARE - MED/SURG/GYN PRIV*

## 2024-11-01 PROCEDURE — 700102 HCHG RX REV CODE 250 W/ 637 OVERRIDE(OP): Performed by: NURSE PRACTITIONER

## 2024-11-01 PROCEDURE — 94664 DEMO&/EVAL PT USE INHALER: CPT

## 2024-11-01 RX ORDER — POLYETHYLENE GLYCOL 3350 17 G/17G
1 POWDER, FOR SOLUTION ORAL
Status: DISCONTINUED | OUTPATIENT
Start: 2024-11-01 | End: 2024-11-03 | Stop reason: HOSPADM

## 2024-11-01 RX ORDER — SODIUM CHLORIDE 9 MG/ML
500 INJECTION, SOLUTION INTRAVENOUS ONCE
Status: COMPLETED | OUTPATIENT
Start: 2024-11-01 | End: 2024-11-01

## 2024-11-01 RX ORDER — AMOXICILLIN 250 MG
2 CAPSULE ORAL EVERY EVENING
Status: DISCONTINUED | OUTPATIENT
Start: 2024-11-01 | End: 2024-11-03 | Stop reason: HOSPADM

## 2024-11-01 RX ADMIN — OXYCODONE 5 MG: 5 TABLET ORAL at 01:58

## 2024-11-01 RX ADMIN — SODIUM CHLORIDE 500 ML: 9 INJECTION, SOLUTION INTRAVENOUS at 06:19

## 2024-11-01 RX ADMIN — SENNOSIDES AND DOCUSATE SODIUM 2 TABLET: 50; 8.6 TABLET ORAL at 16:07

## 2024-11-01 RX ADMIN — GABAPENTIN 300 MG: 300 CAPSULE ORAL at 16:46

## 2024-11-01 RX ADMIN — OXYCODONE 5 MG: 5 TABLET ORAL at 19:56

## 2024-11-01 RX ADMIN — CALCIUM 500 MG: 500 TABLET ORAL at 07:26

## 2024-11-01 RX ADMIN — MOMETASONE FUROATE AND FORMOTEROL FUMARATE DIHYDRATE 1 PUFF: 200; 5 AEROSOL RESPIRATORY (INHALATION) at 02:22

## 2024-11-01 RX ADMIN — OXYCODONE 5 MG: 5 TABLET ORAL at 09:41

## 2024-11-01 RX ADMIN — OXYCODONE 5 MG: 5 TABLET ORAL at 12:32

## 2024-11-01 RX ADMIN — GABAPENTIN 300 MG: 300 CAPSULE ORAL at 08:55

## 2024-11-01 RX ADMIN — GABAPENTIN 300 MG: 300 CAPSULE ORAL at 01:59

## 2024-11-01 RX ADMIN — CHOLECALCIFEROL TAB 125 MCG (5000 UNIT) 5000 UNITS: 125 TAB at 05:35

## 2024-11-01 RX ADMIN — OXYCODONE 5 MG: 5 TABLET ORAL at 16:08

## 2024-11-01 RX ADMIN — ATORVASTATIN CALCIUM 20 MG: 20 TABLET, FILM COATED ORAL at 22:15

## 2024-11-01 RX ADMIN — CALCIUM 500 MG: 500 TABLET ORAL at 16:08

## 2024-11-01 RX ADMIN — MOMETASONE FUROATE AND FORMOTEROL FUMARATE DIHYDRATE 1 PUFF: 200; 5 AEROSOL RESPIRATORY (INHALATION) at 14:13

## 2024-11-01 RX ADMIN — ARIPIPRAZOLE 30 MG: 15 TABLET ORAL at 06:21

## 2024-11-01 ASSESSMENT — ENCOUNTER SYMPTOMS
NERVOUS/ANXIOUS: 0
DIZZINESS: 0
SHORTNESS OF BREATH: 0
HEADACHES: 0
FALLS: 0
NAUSEA: 0
ABDOMINAL PAIN: 0
FEVER: 0

## 2024-11-01 ASSESSMENT — PAIN DESCRIPTION - PAIN TYPE
TYPE: ACUTE PAIN

## 2024-11-01 ASSESSMENT — COGNITIVE AND FUNCTIONAL STATUS - GENERAL
PERSONAL GROOMING: A LITTLE
EATING MEALS: A LITTLE
DRESSING REGULAR UPPER BODY CLOTHING: A LITTLE
CLIMB 3 TO 5 STEPS WITH RAILING: TOTAL
MOBILITY SCORE: 11
STANDING UP FROM CHAIR USING ARMS: A LOT
DAILY ACTIVITIY SCORE: 16
HELP NEEDED FOR BATHING: A LOT
SUGGESTED CMS G CODE MODIFIER DAILY ACTIVITY: CK
MOVING TO AND FROM BED TO CHAIR: A LOT
DRESSING REGULAR LOWER BODY CLOTHING: A LITTLE
MOVING FROM LYING ON BACK TO SITTING ON SIDE OF FLAT BED: A LOT
TOILETING: A LOT
TURNING FROM BACK TO SIDE WHILE IN FLAT BAD: A LOT
SUGGESTED CMS G CODE MODIFIER MOBILITY: CL
WALKING IN HOSPITAL ROOM: A LOT

## 2024-11-01 NOTE — CARE PLAN
The patient is Stable - Low risk of patient condition declining or worsening    Shift Goals  Clinical Goals: pain, safety, npo, surgery today  Patient Goals: comfort  Family Goals: na    Progress made toward(s) clinical / shift goals:    Problem: Pain - Standard  Goal: Alleviation of pain or a reduction in pain to the patient’s comfort goal  Outcome: Progressing  Note: Patient educated on 0-10 pain scale, available pain medications, and non-pharmacological methods of pain management. Verbalizes understanding. See MAR.        Problem: Knowledge Deficit - Standard  Goal: Patient and family/care givers will demonstrate understanding of plan of care, disease process/condition, diagnostic tests and medications  Outcome: Progressing  Note: Patient updated on POC, including plan for surgery tomorrow and PT/OT following after. No concerns at this time. Personal belongings and call light within reach.        Problem: Safety  Goal: Will remain free from injury  Outcome: Progressing  Goal: Will remain free from falls  Outcome: Progressing     Problem: Mobility  Goal: Risk for activity intolerance will decrease  Outcome: Progressing     Problem: Skin Integrity  Goal: Skin integrity is maintained or improved  Outcome: Progressing     Problem: Fall Risk  Goal: Patient will remain free from falls  Outcome: Progressing  Note: Patient educated on using call light and to wait for staff to be in the room before attempting to mobilize. Verbalizes understanding          Patient is not progressing towards the following goals:

## 2024-11-01 NOTE — CARE PLAN
The patient is Stable - Low risk of patient condition declining or worsening    Shift Goals  Clinical Goals: pain control, safety, comfort  Patient Goals: pain control, comfort  Family Goals: not present    Progress made toward(s) clinical / shift goals:  yes      Problem: Pain - Standard  Goal: Alleviation of pain or a reduction in pain to the patient’s comfort goal  Description: Target End Date:  Prior to discharge or change in level of care    Document on Vitals flowsheet    1.  Document pain using the appropriate pain scale per order or unit policy  2.  Educate and implement non-pharmacologic comfort measures (i.e. relaxation, distraction, massage, cold/heat therapy, etc.)  3.  Pain management medications as ordered  4.  Reassess pain after pain med administration per policy  5.  If opiods administered assess patient's response to pain medication is appropriate per POSS sedation scale  6.  Follow pain management plan developed in collaboration with patient and interdisciplinary team (including palliative care or pain specialists if applicable)  Outcome: Progressing     Problem: Knowledge Deficit - Standard  Goal: Patient and family/care givers will demonstrate understanding of plan of care, disease process/condition, diagnostic tests and medications  Description: Target End Date:  1-3 days or as soon as patient condition allows    Document in Patient Education    1.  Patient and family/caregiver oriented to unit, equipment, visitation policy and means for communicating concern  2.  Complete/review Learning Assessment  3.  Assess knowledge level of disease process/condition, treatment plan, diagnostic tests and medications  4.  Explain disease process/condition, treatment plan, diagnostic tests and medications  Outcome: Progressing     Problem: Safety  Goal: Will remain free from injury  Outcome: Progressing  Goal: Will remain free from falls  Outcome: Progressing     Problem: Mobility  Goal: Risk for activity  intolerance will decrease  Outcome: Progressing

## 2024-11-01 NOTE — PROGRESS NOTES
Virtual Nurse rounding complete.    Round Needs: Pain Rating 8/10 and Notified of Patient Needs: Primary RN.

## 2024-11-01 NOTE — DISCHARGE PLANNING
RN NEREIDA met with patient at bedside to complete assessment.   Pleasantly A&Ox4 and able to verify information on face sheet.   She lives in [facility]: [address], [place] [address], where she has been for the last 3 years and staff assist with ADLs and IADLs.   She has a diagnosis of schizophrenia which is being treated by a psychiatrist/psychopharmacological medications.   She is on 2L Nocturnal oxygen at baseline (unknown providing company).   She owns and uses Bariatric 4WW at baseline which is at bedside.  DPOA is Brittney Vance: [phone], or Kylah Gilbert: [phone].   No current financial or substance misuse concerns.   PT/OT recommending post-acute placement.   Plan is for orthopaedic surgery in 10 days.   She is insured by Medicaid FFS.   [facility] referrals sent per protocol, accepted with [facility], and [facility].   FARHANA in manual review.     Case Management Discharge Planning    Admission Date: 10/30/2024  GMLOS: 2.7  ALOS: 1    6-Clicks ADL Score: 16  6-Clicks Mobility Score: 11  PT and/or OT Eval ordered: Yes  Post-acute Referrals Ordered: Yes  Post-acute Choice Obtained: No  Has referral(s) been sent to post-acute provider:  Yes      Anticipated Discharge Dispo: Discharge Disposition: D/T to SNF with Medicare cert in anticipation of skilled care (03)    DME Needed: No    Action(s) Taken: Updated Provider/Nurse on Discharge Plan, Patient Conference, and DC Assessment Complete (See below)    Escalations Completed: None    Medically Clear: No    Next Steps: Pending clearance and discussion with DPOA on post-acute choice.     Barriers to Discharge: Medical clearance, Pending Placement, and Pending Insurance Authorization    Is the patient up for discharge tomorrow: Unknown.     Care Transition Team Assessment    Information Source  Orientation Level: Oriented X4  Information Given By: Patient  Informant's Name: Lorene  Who is responsible for making decisions for  patient? : Patient    Readmission Evaluation  Is this a readmission?: No    Elopement Risk  Legal Hold: No  Ambulatory or Self Mobile in Wheelchair: No-Not an Elopement Risk  Elopement Risk: Not at Risk for Elopement    Interdisciplinary Discharge Planning  Does Admitting Nurse Feel This Could be a Complex Discharge?: No  Primary Care Physician: Lili Reddy MD in [facility]  Lives with - Patient's Self Care Capacity: Attendant / Paid Care Giver (Group home)  Patient or legal guardian wants to designate a caregiver: Yes  Caregiver [place]: N[place]ys  [place] Authorization for Release of Health Information has been completed: Yes  Support Systems: 12 Step Group  Housing / Facility: [address] [place] [address]  Prior Services: Intermittent Physical Support for ADL Per Service    Discharge Preparedness  What is your plan after discharge?: Skilled nursing facility  What are your discharge supports?: Other (comment) (Group Home Staff)  Prior Functional Level: Ambulatory, Needs Assist with Activities of Daily Living, Needs Assist with Medication Management, Uses Walker  Difficulity with ADLs: Bathing, Walking, Dressing  Difficulty with ADLs Comment: Uses 4WW at baseline.  staff assistsiw tih ADLs as needed.  Difficulity with IADLs: Cooking, Driving, Laundry, Managing medication, Shopping  Difficulity with IADL Comments:  staff assists with IADLs as needed.    Functional Assesment  Prior Functional Level: Ambulatory, Needs Assist with Activities of Daily Living, Needs Assist with Medication Management, Uses Walker    Finances  Financial Barriers to Discharge: Yes  Source of Income: Social Security Disability  Prescription Coverage: Yes    Values / Beliefs / Concerns  Values / Beliefs Concerns : No    Advance Directive  Advance Directive?: DPOA for Health Care  Durable Power of  Name and Contact : Brittney Rajiv: [phone]    Domestic Abuse  Have you ever been the victim of abuse or violence?: No  Possible Abuse/Neglect  Reported to:: Not Applicable    Psychological Assessment  History of Substance Abuse: None  History of Psychiatric Problems: Yes  Non-compliant with Treatment: No  Newly Diagnosed Illness: Yes    Discharge Risks or Barriers  Discharge risks or barriers?: Complex medical needs, Mental health  Patient risk factors: Cognitive / sensory / physical deficit, Complex medical needs, Mental health, Vulnerable adult, Multiple ED visits    Anticipated Discharge Information  Discharge Disposition: D/T to SNF with Medicare cert in anticipation of skilled care (03)

## 2024-11-01 NOTE — DOCUMENTATION QUERY
"                                                                         Formerly [facility], 1928–1983                                                                       Query Response Note      PATIENT:               ADELINA MILLAN  ACCT #:                  [id number]  MRN:                     [id number]  :                      1975  ADMIT DATE:       10/30/2024 11:06 PM  DISCH DATE:          RESPONDING  PROVIDER #:        [id number]           QUERY TEXT:    Atrial fibrillation is documented in the Medical Record.      Please specify the type.    The patient's Clinical Indicators include:  49 year old female admitted with  a talus fracture.    10/31 [place] \"AF (atrial fibrillation) (HCC)- (present on admission)  Continue metoprolol and Eliquis  Patient instructed to hold Eliquis 48 hours before surgery\"    Risk factors: obesity, COPD  Treatment: labs, Patient instructed to hold Eliquis 48 hours before surgery    If you have any questions, please contact:  Arlyn Quevedo RN CDI [facility]ly [facility], [facility]  [email or web address]  Arlyn Quevedo [address] [employer]    Note: If you agree with a diagnosis listed, please remember to include it in your concurrent daily documentation and onto the Discharge Summary  Options provided:   -- Permanent atrial fibrillation (persistent or longstanding persistent AF where cardioversion cannot or will not be performed   -- Other persistent atrial fibrillation (AF that does not terminate within 7 days or that requires repeat pharmacological or electrical cardioversion.   -- Longstanding persistent atrial fibrillation (AF that is persistent and continuous, lasting longer than 1 year)   -- Chronic atrial fibrillation, unspecified (may refer to persistent, longstanding persistent or permanent AF.  A more specific descriptive term is preferred over the nonspecific AF   -- Other explanation, (please specify other explanation)      Query created by: Arlyn Quevedo on 2024 9:51 AM    RESPONSE TEXT:    Other " persistent atrial fibrillation (AF that does not terminate within 7 days or that requires repeat pharmacological or electrical cardioversion.          Electronically signed by:  REYNA BIRCH MD 11/1/2024 3:22 PM

## 2024-11-01 NOTE — DIETARY
NUTRITION SERVICES: BMI - Pt with BMI >40 (=Body mass index is 51.58 kg/m².), Class III obesity. Weight loss counseling not appropriate in acute care setting. RECOMMEND - If appropriate at DC please refer to outpatient nutrition services for weight management.

## 2024-11-01 NOTE — RESPIRATORY CARE
COPD EDUCATION by COPD CLINICAL EDUCATOR  11/1/2024  at  10:31 AM by Bulmaro Seals, RRT     Patient interviewed by education team.  Patient declined or is unable to participate in the full program.  Therefore, a short intervention has been conducted.  A comprehensive packet including information about COPD, types of treatments to manage their disease and safe home Oxygen usage was provided and reviewed with patient at the bedside.     PFT indicates mixed restrictive/obstructive condition. I reviewed current medications with the patient at the bedside, she quit smoking in 2022 patient in significant pain but was good historian. Wears 2 LPM NOC    COPD Screen  COPD Risk Screening  Do you have a history of COPD?: Yes  Do you have a Pulmonologist?: Yes    COPD Assessment  COPD Clinical Specialists ONLY  COPD Education Initiated: Yes--Short Intervention (quit smoking in 2022, mixed restrictive/obstructive per pft)  Is this a COPD exacerbation patient?: No  DME Company: [employer] Equipment Type: 2LPM noc nebulizer oxygen concentrator  Physician Name: claus sanchez (just saw last week)  Pulmonary Follow Up Appointment: 12/06/24  Appt Time: 1600  Pulmonologist Name: daniel Archer  $ Demo/Eval of SVN's, MDI's and Aerosols: Yes (gave spacer with demonstration)  (OP) Pulmonary Function Testing: Yes (09/21/2021 FEV1=48 FEV1/FVC=86)  Interdisciplinary Rounds: Attendance at Rounds (30 Min)    PFT Results    09/21/2021 FEV1=48 FEV1/FVC=86    Meds to Beds  [facility] provides bedside medication delivery for all eligible patients at discharge and you have been automatically enrolled in the Meds to Beds Program. Would you like to opt out of this program for any reason?: No - Stay Opted In     MY COPD ACTION PLAN     It is recommended that patients and physicians /healthcare providers complete this action plan together. This plan should be discussed at each physician visit and updated as needed.    The green, yellow and red zones  "show groups of symptoms of COPD. This list of symptoms is not comprehensive, and you may experience other symptoms. In the \"Actions\" column, your healthcare provider has recommended actions for you to take based on your symptoms.    Patient Name: Lorene Haro   YOB: 1975   Last Updated on: 11/1/2024 10:30 AM   Green Zone:  I am doing well today Actions     Usual activitiy and exercise level   Take daily medications     Usual amounts of cough and phlegm/mucus   Use oxygen as prescribed     Sleep well at night   Continue regular exercise/diet plan     Appetite is good   At all times avoid cigarette smoke, inhaled irritants     Daily Medications (these medications are taken every day):   Fluticosone/Salmeterol (Advair) 1 Puff Twice daily     Additional Information:  Rinse mouth and spit after using Advair    Yellow Zone:  I am having a bad day or a COPD flare Actions     More breathless than usual   Continue daily medications     I have less energy for my daily activities   Use quick relief inhaler as ordered     Increased or thicker phlegm/mucus   Use oxygen as prescribed     Using quick relief inhaler/nebulizer more often   Get plenty of rest     Swelling of ankles more than usual   Use pursed lip breathing     More coughing than usual   At all times avoid cigarette smoke, inhaled irritants     I feel like I have a \"chest cold\"     Poor sleep and my symptoms woke me up     My appetite is not good     My medicine is not helping      Call provider immediately if symptoms don’t improve     Continue daily medications, add rescue medications:   Albuterol  Albuterol 2 Puffs  3mL via nebulizer Every 6 hours PRN         Medications to be used during a flare up, (as Discussed with Provider):           Additional Information:  Use spacer with rescue inhaler  use nebulizer as directed by your Doctor    Red Zone:  I need urgent medical care Actions     Severe shortness of breath even at rest   Call 911 or " seek medical care immediately     Not able to do any activity because of breathing      Fever or shaking chills      Feeling confused or very drowsy       Chest pains      Coughing up blood

## 2024-11-02 ENCOUNTER — APPOINTMENT (OUTPATIENT)
Dept: RADIOLOGY | Facility: MEDICAL CENTER | Age: 49
DRG: 504 | End: 2024-11-02
Attending: ORTHOPAEDIC SURGERY
Payer: MEDICAID

## 2024-11-02 ENCOUNTER — ANESTHESIA EVENT (OUTPATIENT)
Dept: SURGERY | Facility: MEDICAL CENTER | Age: 49
DRG: 504 | End: 2024-11-02
Payer: MEDICAID

## 2024-11-02 ENCOUNTER — ANESTHESIA (OUTPATIENT)
Dept: SURGERY | Facility: MEDICAL CENTER | Age: 49
DRG: 504 | End: 2024-11-02
Payer: MEDICAID

## 2024-11-02 LAB
ALBUMIN SERPL BCP-MCNC: 3.5 G/DL (ref 3.2–4.9)
BUN SERPL-MCNC: 16 MG/DL (ref 8–22)
CALCIUM ALBUM COR SERPL-MCNC: 9.6 MG/DL (ref 8.5–10.5)
CALCIUM SERPL-MCNC: 9.2 MG/DL (ref 8.5–10.5)
CHLORIDE SERPL-SCNC: 103 MMOL/L (ref 96–112)
CO2 SERPL-SCNC: 27 MMOL/L (ref 20–33)
CREAT SERPL-MCNC: 0.78 MG/DL (ref 0.5–1.4)
ERYTHROCYTE [DISTWIDTH] IN BLOOD BY AUTOMATED COUNT: 51.4 FL (ref 35.9–50)
GFR SERPLBLD CREATININE-BSD FMLA CKD-EPI: 92 ML/MIN/1.73 M 2
GLUCOSE SERPL-MCNC: 103 MG/DL (ref 65–99)
HCT VFR BLD AUTO: 37.3 % (ref 37–47)
HGB BLD-MCNC: 11.3 G/DL (ref 12–16)
MCH RBC QN AUTO: 26.8 PG (ref 27–33)
MCHC RBC AUTO-ENTMCNC: 30.3 G/DL (ref 32.2–35.5)
MCV RBC AUTO: 88.4 FL (ref 81.4–97.8)
PHOSPHATE SERPL-MCNC: 3.9 MG/DL (ref 2.5–4.5)
PLATELET # BLD AUTO: 210 K/UL (ref 164–446)
PMV BLD AUTO: 11.7 FL (ref 9–12.9)
POTASSIUM SERPL-SCNC: 4.6 MMOL/L (ref 3.6–5.5)
RBC # BLD AUTO: 4.22 M/UL (ref 4.2–5.4)
SODIUM SERPL-SCNC: 140 MMOL/L (ref 135–145)
WBC # BLD AUTO: 7.8 K/UL (ref 4.8–10.8)

## 2024-11-02 PROCEDURE — 770001 HCHG ROOM/CARE - MED/SURG/GYN PRIV*

## 2024-11-02 PROCEDURE — 700102 HCHG RX REV CODE 250 W/ 637 OVERRIDE(OP): Performed by: HOSPITALIST

## 2024-11-02 PROCEDURE — 700101 HCHG RX REV CODE 250: Performed by: ANESTHESIOLOGY

## 2024-11-02 PROCEDURE — 36415 COLL VENOUS BLD VENIPUNCTURE: CPT

## 2024-11-02 PROCEDURE — 160035 HCHG PACU - 1ST 60 MINS PHASE I: Performed by: ORTHOPAEDIC SURGERY

## 2024-11-02 PROCEDURE — 85027 COMPLETE CBC AUTOMATED: CPT

## 2024-11-02 PROCEDURE — 700111 HCHG RX REV CODE 636 W/ 250 OVERRIDE (IP): Mod: JZ | Performed by: ANESTHESIOLOGY

## 2024-11-02 PROCEDURE — C1713 ANCHOR/SCREW BN/BN,TIS/BN: HCPCS | Performed by: ORTHOPAEDIC SURGERY

## 2024-11-02 PROCEDURE — 80069 RENAL FUNCTION PANEL: CPT

## 2024-11-02 PROCEDURE — 99222 1ST HOSP IP/OBS MODERATE 55: CPT | Mod: 57 | Performed by: ORTHOPAEDIC SURGERY

## 2024-11-02 PROCEDURE — A9270 NON-COVERED ITEM OR SERVICE: HCPCS | Performed by: ANESTHESIOLOGY

## 2024-11-02 PROCEDURE — 160048 HCHG OR STATISTICAL LEVEL 1-5: Performed by: ORTHOPAEDIC SURGERY

## 2024-11-02 PROCEDURE — 73600 X-RAY EXAM OF ANKLE: CPT | Mod: LT

## 2024-11-02 PROCEDURE — A9270 NON-COVERED ITEM OR SERVICE: HCPCS | Performed by: STUDENT IN AN ORGANIZED HEALTH CARE EDUCATION/TRAINING PROGRAM

## 2024-11-02 PROCEDURE — A9270 NON-COVERED ITEM OR SERVICE: HCPCS | Performed by: NURSE PRACTITIONER

## 2024-11-02 PROCEDURE — 28445 OPTX TALUS FRACTURE: CPT | Mod: LT | Performed by: ORTHOPAEDIC SURGERY

## 2024-11-02 PROCEDURE — 700102 HCHG RX REV CODE 250 W/ 637 OVERRIDE(OP): Performed by: STUDENT IN AN ORGANIZED HEALTH CARE EDUCATION/TRAINING PROGRAM

## 2024-11-02 PROCEDURE — 700102 HCHG RX REV CODE 250 W/ 637 OVERRIDE(OP): Performed by: ANESTHESIOLOGY

## 2024-11-02 PROCEDURE — 700111 HCHG RX REV CODE 636 W/ 250 OVERRIDE (IP): Performed by: ANESTHESIOLOGY

## 2024-11-02 PROCEDURE — 160041 HCHG SURGERY MINUTES - EA ADDL 1 MIN LEVEL 4: Performed by: ORTHOPAEDIC SURGERY

## 2024-11-02 PROCEDURE — 160009 HCHG ANES TIME/MIN: Performed by: ORTHOPAEDIC SURGERY

## 2024-11-02 PROCEDURE — 700102 HCHG RX REV CODE 250 W/ 637 OVERRIDE(OP): Performed by: NURSE PRACTITIONER

## 2024-11-02 PROCEDURE — A9270 NON-COVERED ITEM OR SERVICE: HCPCS | Performed by: HOSPITALIST

## 2024-11-02 PROCEDURE — 99232 SBSQ HOSP IP/OBS MODERATE 35: CPT | Performed by: INTERNAL MEDICINE

## 2024-11-02 PROCEDURE — 0QSM04Z REPOSITION LEFT TARSAL WITH INTERNAL FIXATION DEVICE, OPEN APPROACH: ICD-10-PCS | Performed by: ORTHOPAEDIC SURGERY

## 2024-11-02 PROCEDURE — 160029 HCHG SURGERY MINUTES - 1ST 30 MINS LEVEL 4: Performed by: ORTHOPAEDIC SURGERY

## 2024-11-02 PROCEDURE — 700105 HCHG RX REV CODE 258: Performed by: ANESTHESIOLOGY

## 2024-11-02 PROCEDURE — 160002 HCHG RECOVERY MINUTES (STAT): Performed by: ORTHOPAEDIC SURGERY

## 2024-11-02 DEVICE — IMPLANTABLE DEVICE: Type: IMPLANTABLE DEVICE | Site: ANKLE | Status: FUNCTIONAL

## 2024-11-02 RX ORDER — SUCCINYLCHOLINE CHLORIDE 20 MG/ML
INJECTION INTRAMUSCULAR; INTRAVENOUS PRN
Status: DISCONTINUED | OUTPATIENT
Start: 2024-11-02 | End: 2024-11-02 | Stop reason: SURG

## 2024-11-02 RX ORDER — OXYCODONE HCL 5 MG/5 ML
5 SOLUTION, ORAL ORAL
Status: COMPLETED | OUTPATIENT
Start: 2024-11-02 | End: 2024-11-02

## 2024-11-02 RX ORDER — ONDANSETRON 2 MG/ML
4 INJECTION INTRAMUSCULAR; INTRAVENOUS
Status: DISCONTINUED | OUTPATIENT
Start: 2024-11-02 | End: 2024-11-02 | Stop reason: HOSPADM

## 2024-11-02 RX ORDER — SODIUM CHLORIDE 9 MG/ML
INJECTION, SOLUTION INTRAVENOUS
Status: DISCONTINUED | OUTPATIENT
Start: 2024-11-02 | End: 2024-11-02 | Stop reason: SURG

## 2024-11-02 RX ORDER — DIPHENHYDRAMINE HYDROCHLORIDE 50 MG/ML
12.5 INJECTION INTRAMUSCULAR; INTRAVENOUS
Status: DISCONTINUED | OUTPATIENT
Start: 2024-11-02 | End: 2024-11-02 | Stop reason: HOSPADM

## 2024-11-02 RX ORDER — SODIUM CHLORIDE 9 MG/ML
INJECTION, SOLUTION INTRAVENOUS CONTINUOUS
Status: DISCONTINUED | OUTPATIENT
Start: 2024-11-02 | End: 2024-11-02 | Stop reason: HOSPADM

## 2024-11-02 RX ORDER — HYDROMORPHONE HYDROCHLORIDE 1 MG/ML
0.4 INJECTION, SOLUTION INTRAMUSCULAR; INTRAVENOUS; SUBCUTANEOUS
Status: DISCONTINUED | OUTPATIENT
Start: 2024-11-02 | End: 2024-11-02 | Stop reason: HOSPADM

## 2024-11-02 RX ORDER — EPHEDRINE SULFATE 50 MG/ML
5 INJECTION, SOLUTION INTRAVENOUS
Status: DISCONTINUED | OUTPATIENT
Start: 2024-11-02 | End: 2024-11-02 | Stop reason: HOSPADM

## 2024-11-02 RX ORDER — OXYCODONE HCL 5 MG/5 ML
10 SOLUTION, ORAL ORAL
Status: COMPLETED | OUTPATIENT
Start: 2024-11-02 | End: 2024-11-02

## 2024-11-02 RX ORDER — HYDROMORPHONE HYDROCHLORIDE 1 MG/ML
0.1 INJECTION, SOLUTION INTRAMUSCULAR; INTRAVENOUS; SUBCUTANEOUS
Status: DISCONTINUED | OUTPATIENT
Start: 2024-11-02 | End: 2024-11-02 | Stop reason: HOSPADM

## 2024-11-02 RX ORDER — HYDROMORPHONE HYDROCHLORIDE 1 MG/ML
0.2 INJECTION, SOLUTION INTRAMUSCULAR; INTRAVENOUS; SUBCUTANEOUS
Status: DISCONTINUED | OUTPATIENT
Start: 2024-11-02 | End: 2024-11-02 | Stop reason: HOSPADM

## 2024-11-02 RX ORDER — CEFAZOLIN SODIUM 1 G/3ML
INJECTION, POWDER, FOR SOLUTION INTRAMUSCULAR; INTRAVENOUS PRN
Status: DISCONTINUED | OUTPATIENT
Start: 2024-11-02 | End: 2024-11-02 | Stop reason: SURG

## 2024-11-02 RX ORDER — MIDAZOLAM HYDROCHLORIDE 1 MG/ML
1 INJECTION INTRAMUSCULAR; INTRAVENOUS
Status: DISCONTINUED | OUTPATIENT
Start: 2024-11-02 | End: 2024-11-02 | Stop reason: HOSPADM

## 2024-11-02 RX ORDER — HALOPERIDOL 5 MG/ML
1 INJECTION INTRAMUSCULAR
Status: DISCONTINUED | OUTPATIENT
Start: 2024-11-02 | End: 2024-11-02 | Stop reason: HOSPADM

## 2024-11-02 RX ORDER — LABETALOL HYDROCHLORIDE 5 MG/ML
5 INJECTION, SOLUTION INTRAVENOUS
Status: DISCONTINUED | OUTPATIENT
Start: 2024-11-02 | End: 2024-11-02 | Stop reason: HOSPADM

## 2024-11-02 RX ORDER — IPRATROPIUM BROMIDE AND ALBUTEROL SULFATE 2.5; .5 MG/3ML; MG/3ML
3 SOLUTION RESPIRATORY (INHALATION)
Status: DISCONTINUED | OUTPATIENT
Start: 2024-11-02 | End: 2024-11-02 | Stop reason: HOSPADM

## 2024-11-02 RX ORDER — EPHEDRINE SULFATE 50 MG/ML
INJECTION, SOLUTION INTRAVENOUS PRN
Status: DISCONTINUED | OUTPATIENT
Start: 2024-11-02 | End: 2024-11-02 | Stop reason: SURG

## 2024-11-02 RX ORDER — HYDROXYZINE HYDROCHLORIDE 50 MG/1
25 TABLET, FILM COATED ORAL 3 TIMES DAILY PRN
Status: DISCONTINUED | OUTPATIENT
Start: 2024-11-02 | End: 2024-11-03 | Stop reason: HOSPADM

## 2024-11-02 RX ORDER — MEPERIDINE HYDROCHLORIDE 25 MG/ML
12.5 INJECTION INTRAMUSCULAR; INTRAVENOUS; SUBCUTANEOUS
Status: DISCONTINUED | OUTPATIENT
Start: 2024-11-02 | End: 2024-11-02 | Stop reason: HOSPADM

## 2024-11-02 RX ORDER — HYDRALAZINE HYDROCHLORIDE 20 MG/ML
5 INJECTION INTRAMUSCULAR; INTRAVENOUS
Status: DISCONTINUED | OUTPATIENT
Start: 2024-11-02 | End: 2024-11-02 | Stop reason: HOSPADM

## 2024-11-02 RX ADMIN — CHOLECALCIFEROL TAB 125 MCG (5000 UNIT) 5000 UNITS: 125 TAB at 05:38

## 2024-11-02 RX ADMIN — IPRATROPIUM BROMIDE AND ALBUTEROL SULFATE 3 ML: 2.5; .5 SOLUTION RESPIRATORY (INHALATION) at 15:24

## 2024-11-02 RX ADMIN — PROPOFOL 150 MG: 10 INJECTION, EMULSION INTRAVENOUS at 14:37

## 2024-11-02 RX ADMIN — GABAPENTIN 300 MG: 300 CAPSULE ORAL at 01:27

## 2024-11-02 RX ADMIN — CALCIUM 500 MG: 500 TABLET ORAL at 17:04

## 2024-11-02 RX ADMIN — SUCCINYLCHOLINE CHLORIDE 140 MG: 20 INJECTION, SOLUTION INTRAMUSCULAR; INTRAVENOUS at 14:38

## 2024-11-02 RX ADMIN — POLYETHYLENE GLYCOL 3350 1 PACKET: 17 POWDER, FOR SOLUTION ORAL at 17:04

## 2024-11-02 RX ADMIN — CALCIUM 500 MG: 500 TABLET ORAL at 07:30

## 2024-11-02 RX ADMIN — EPHEDRINE SULFATE 15 MG: 50 INJECTION, SOLUTION INTRAVENOUS at 14:57

## 2024-11-02 RX ADMIN — ATORVASTATIN CALCIUM 20 MG: 20 TABLET, FILM COATED ORAL at 20:10

## 2024-11-02 RX ADMIN — GABAPENTIN 300 MG: 300 CAPSULE ORAL at 09:38

## 2024-11-02 RX ADMIN — FENTANYL CITRATE 50 MCG: 50 INJECTION, SOLUTION INTRAMUSCULAR; INTRAVENOUS at 14:55

## 2024-11-02 RX ADMIN — MOMETASONE FUROATE AND FORMOTEROL FUMARATE DIHYDRATE 1 PUFF: 200; 5 AEROSOL RESPIRATORY (INHALATION) at 01:28

## 2024-11-02 RX ADMIN — EPHEDRINE SULFATE 15 MG: 50 INJECTION, SOLUTION INTRAVENOUS at 14:47

## 2024-11-02 RX ADMIN — ROCURONIUM BROMIDE 10 MG: 10 INJECTION, SOLUTION INTRAVENOUS at 14:37

## 2024-11-02 RX ADMIN — OXYCODONE 5 MG: 5 TABLET ORAL at 20:10

## 2024-11-02 RX ADMIN — SENNOSIDES AND DOCUSATE SODIUM 2 TABLET: 50; 8.6 TABLET ORAL at 17:03

## 2024-11-02 RX ADMIN — GABAPENTIN 300 MG: 300 CAPSULE ORAL at 18:30

## 2024-11-02 RX ADMIN — ARIPIPRAZOLE 30 MG: 15 TABLET ORAL at 05:38

## 2024-11-02 RX ADMIN — OXYCODONE 5 MG: 5 TABLET ORAL at 17:09

## 2024-11-02 RX ADMIN — OXYCODONE HYDROCHLORIDE 10 MG: 5 SOLUTION ORAL at 15:37

## 2024-11-02 RX ADMIN — CYCLOBENZAPRINE 10 MG: 10 TABLET, FILM COATED ORAL at 23:14

## 2024-11-02 RX ADMIN — FENTANYL CITRATE 25 MCG: 50 INJECTION, SOLUTION INTRAMUSCULAR; INTRAVENOUS at 15:50

## 2024-11-02 RX ADMIN — FENTANYL CITRATE 50 MCG: 50 INJECTION, SOLUTION INTRAMUSCULAR; INTRAVENOUS at 15:37

## 2024-11-02 RX ADMIN — OXYCODONE 5 MG: 5 TABLET ORAL at 23:13

## 2024-11-02 RX ADMIN — OXYCODONE 5 MG: 5 TABLET ORAL at 08:28

## 2024-11-02 RX ADMIN — ACETAMINOPHEN 650 MG: 325 TABLET ORAL at 23:58

## 2024-11-02 RX ADMIN — CEFAZOLIN 3 G: 1 INJECTION, POWDER, FOR SOLUTION INTRAMUSCULAR; INTRAVENOUS at 14:45

## 2024-11-02 RX ADMIN — SODIUM CHLORIDE: 9 INJECTION, SOLUTION INTRAVENOUS at 14:27

## 2024-11-02 ASSESSMENT — COGNITIVE AND FUNCTIONAL STATUS - GENERAL
SUGGESTED CMS G CODE MODIFIER DAILY ACTIVITY: CK
MOVING FROM LYING ON BACK TO SITTING ON SIDE OF FLAT BED: A LOT
TOILETING: A LOT
MOBILITY SCORE: 13
TURNING FROM BACK TO SIDE WHILE IN FLAT BAD: A LITTLE
SUGGESTED CMS G CODE MODIFIER MOBILITY: CL
CLIMB 3 TO 5 STEPS WITH RAILING: A LOT
DRESSING REGULAR UPPER BODY CLOTHING: A LITTLE
MOVING TO AND FROM BED TO CHAIR: A LOT
WALKING IN HOSPITAL ROOM: A LOT
HELP NEEDED FOR BATHING: A LOT
DRESSING REGULAR LOWER BODY CLOTHING: A LOT
STANDING UP FROM CHAIR USING ARMS: A LOT
DAILY ACTIVITIY SCORE: 17

## 2024-11-02 ASSESSMENT — PAIN DESCRIPTION - PAIN TYPE
TYPE: ACUTE PAIN
TYPE: ACUTE PAIN
TYPE: SURGICAL PAIN
TYPE: ACUTE PAIN
TYPE: SURGICAL PAIN
TYPE: ACUTE PAIN
TYPE: SURGICAL PAIN

## 2024-11-02 ASSESSMENT — ENCOUNTER SYMPTOMS
NAUSEA: 0
NERVOUS/ANXIOUS: 0
SHORTNESS OF BREATH: 0
ABDOMINAL PAIN: 0
FEVER: 0
DIZZINESS: 0
HEADACHES: 0
FALLS: 0

## 2024-11-02 ASSESSMENT — PAIN SCALES - GENERAL: PAIN_LEVEL: 0

## 2024-11-02 NOTE — PROGRESS NOTES
Hospital Medicine Daily Progress Note    Date of Service  11/1/2024    Chief Complaint  Left ankle pain    Hospital Course  Lorene Haro is a 49-year-old morbidly obese female who 3 days ago had rolled her ankle while she was bowling and had left ankle pain.  She was worked up and found to have on CT a vertically oriented fracture of the talus subacute to chronic.  Orthopedics was consulted and recommended outpatient surgery in 10 days.  However due to the medical complexities and inability of the patient to manage on her own at home orthopedics were reconsulted on 10/31 with the emergency room physician alerting us that orthopedist request inpatient admit to have surgical repair.    Interval Problem Update  10/31/24: Patient states that over the past 3 weeks she has been noticing left heel pain but still been getting around on it.  She bowls with [employer] and was at her bowling event and was having left heel pain and looked down and saw to have significant bruising of the left ankle.  She sought care thereafter here in the emergency room.  Imaging showed vertical oriented fracture.  Patient currently with 8 out of 10 pain.  She is alert and oriented.  No fevers.  The patient does not remember any specific traumas to her left ankle  11/1/24. Patient seen and examine at bedside  Orthopedics aware, discussed with Cristo.He is reaching out to see exact schedule for surgery. At this time if it is going to be more than a week, SNF recommended in the interim. Eliquis is HELD (on it for Afib).  Patient otherwise stable, pain contolled.no new issues or concerns. Monitor mentation/sedation score, respirations blood pressure and bowel regimen if on narcotics.  I reviewed the chart along with vitals, labs, imaging, test (both pending and resulted) and recommendations from specialists and interdisciplinary team.  I have discussed this patient's plan of care and discharge plan at IDT rounds today with Case Management,  Nursing, Nursing leadership, and other members of the IDT team.    Consultants/Specialty  orthopedics    Code Status  Full Code    Disposition  Medically Cleared  I have placed the appropriate orders for post-discharge needs.    Review of Systems  Review of Systems   Constitutional:  Negative for fever and malaise/fatigue.   Respiratory:  Negative for shortness of breath.    Cardiovascular:  Negative for chest pain and leg swelling.   Gastrointestinal:  Negative for abdominal pain and nausea.   Musculoskeletal:  Positive for joint pain. Negative for falls.   Neurological:  Negative for dizziness and headaches.   Psychiatric/Behavioral:  The patient is not nervous/anxious.         Physical Exam  Temp:  [36.6 °C (97.9 °F)-36.7 °C (98.1 °F)] 36.6 °C (97.9 °F)  Pulse:  [58-63] 63  Resp:  [17-19] 17  BP: ()/(40-53) 110/53  SpO2:  [95 %-96 %] 96 %    Physical Exam  Vitals reviewed.   Constitutional:       Appearance: She is obese. She is not ill-appearing.   HENT:      Head: Normocephalic and atraumatic.      Nose: No congestion.      Mouth/Throat:      Mouth: Mucous membranes are moist.   Eyes:      Extraocular Movements: Extraocular movements intact.      Conjunctiva/sclera: Conjunctivae normal.   Cardiovascular:      Rate and Rhythm: Normal rate and regular rhythm.      Heart sounds: No murmur heard.  Pulmonary:      Effort: No respiratory distress.      Breath sounds: Normal breath sounds. No wheezing.   Abdominal:      General: Bowel sounds are normal. There is no distension.      Palpations: Abdomen is soft.      Tenderness: There is no abdominal tenderness.   Musculoskeletal:      Right lower leg: No edema.      Left lower leg: No edema.   Lymphadenopathy:      Cervical: No cervical adenopathy.   Skin:     General: Skin is dry.   Neurological:      General: No focal deficit present.      Mental Status: She is alert and oriented to person, place, and time.      Cranial Nerves: No cranial nerve deficit.       Motor: No weakness.   Psychiatric:         Mood and Affect: Mood normal.         Thought Content: Thought content normal.         Fluids    Intake/Output Summary (Last 24 hours) at 11/1/2024 1846  Last data filed at 11/1/2024 0420  Gross per 24 hour   Intake --   Output 1200 ml   Net -1200 ml        Laboratory                        Imaging  CT-FOOT W/O PLUS RECONS LEFT   Final Result         1. As noted on the prior conventional radiographs, there is a vertically oriented fracture of the talus. This appears subacute to chronic.   2. Osteopenia.   3. Osteoarthrosis.   4. Muscular atrophy.      DX-ANKLE 3+ VIEWS LEFT   Final Result      New fracture of the posterior talus.           Assessment/Plan  * Talus fracture  Assessment & Plan  CT imaging showing a subacute fracture of the left talus bone  Orthopedist has changed her mind and wanted patient admitted for consideration of surgical intervention  Vitamin D and calcium have been ordered   oxycodone as needed for pain  PT/OT status post surgery  Discussed with patient discussed with surgery recommend ice packing on and off to help with swelling and pain  Weightbearing per postsurgical recommendations from Ortho      Asthma-COPD overlap syndrome (HCC)- (present on admission)  Assessment & Plan  Continue home inhalers  No longer smoking    Hyperlipidemia- (present on admission)  Assessment & Plan  Lipitor    AF (atrial fibrillation) (HCC)- (present on admission)  Assessment & Plan  Continue metoprolol  Holding Eliquis for potential surgery    Vitals:    11/01/24 1503   BP: 110/53   Pulse: 63   Resp: 17   Temp: 36.6 °C (97.9 °F)   SpO2: 96%     Stable BP    Morbid obesity (HCC)- (present on admission)  Assessment & Plan  BMI 51         VTE prophylaxis: Eliquis help preproc    I have performed a physical exam and reviewed and updated ROS and Plan today (11/1/2024). In review of yesterday's note (10/31/2024), there are no changes except as documented above.

## 2024-11-02 NOTE — CARE PLAN
"      Chief Complaint   Patient presents with   • Follow-up     MICHI Collinscarlie Roldan female 15  y.o. 3  m.o.      History was provided by the guardian.     Immunization History   Administered Date(s) Administered   • DTaP 04/27/2004, 11/16/2004, 08/20/2007   • DTaP / Hep B / IPV 2003, 02/13/2004   • HPV Quadrivalent 09/19/2014   • Hep A, 2 Dose 07/19/2018   • Hepatitis A 09/19/2014   • Hepatitis B 2003   • HiB 2003, 02/13/2004, 08/03/2004, 11/16/2004   • Hpv9 07/19/2018   • IPV 04/27/2004, 08/20/2007   • MMR 08/03/2004, 08/20/2007   • Meningococcal Conjugate 09/19/2014   • Meningococcal MCV4P (Menactra) 07/29/2019   • PEDS-Pneumococcal Conjugate (PCV7) 2003, 02/13/2004, 11/16/2004, 08/09/2005   • Tdap 09/19/2014   • Varicella 08/03/2004, 09/19/2014       The following portions of the patient's history were reviewed and updated as appropriate: allergies, current medications, past family history, past medical history, past social history, past surgical history and problem list.    Current Outpatient Medications   Medication Sig Dispense Refill   • norethindrone-ethinyl estradiol-ferrous fumarate (LOESTIN 24 FE) 1-20 MG-MCG(24) per tablet Take 1 tablet by mouth Daily. 28 tablet 2   • cetirizine (zyrTEC) 10 MG tablet Take 10 mg by mouth.     • diphenhydrAMINE (BENADRYL) 25 mg capsule Take 25 mg by mouth Every 6 (Six) Hours As Needed.     • famotidine (PEPCID) 20 MG tablet Take 20 mg by mouth.       No current facility-administered medications for this visit.        No Known Allergies    Past Medical History:   Diagnosis Date   • Allergic rhinitis    • Astigmatism    • Hearing loss     unilateral high frequency sensorineural hearing loss thought to be genetic    • Otitis media        Current Issues:  Current concerns include anxiety/depression, patient reports this is her senior year and \"nothing about it is normal.\" She reports she is missing out on a normal school year and dance " The patient is Stable - Low risk of patient condition declining or worsening    Shift Goals  Clinical Goals: Pain control, npo at midnight, comfort  Patient Goals: comfort  Family Goals: not present    Progress made toward(s) clinical / shift goals:  yes      Problem: Pain - Standard  Goal: Alleviation of pain or a reduction in pain to the patient’s comfort goal  Description: Target End Date:  Prior to discharge or change in level of care    Document on Vitals flowsheet    1.  Document pain using the appropriate pain scale per order or unit policy  2.  Educate and implement non-pharmacologic comfort measures (i.e. relaxation, distraction, massage, cold/heat therapy, etc.)  3.  Pain management medications as ordered  4.  Reassess pain after pain med administration per policy  5.  If opiods administered assess patient's response to pain medication is appropriate per POSS sedation scale  6.  Follow pain management plan developed in collaboration with patient and interdisciplinary team (including palliative care or pain specialists if applicable)  Outcome: Progressing     Problem: Knowledge Deficit - Standard  Goal: Patient and family/care givers will demonstrate understanding of plan of care, disease process/condition, diagnostic tests and medications  Description: Target End Date:  1-3 days or as soon as patient condition allows    Document in Patient Education    1.  Patient and family/caregiver oriented to unit, equipment, visitation policy and means for communicating concern  2.  Complete/review Learning Assessment  3.  Assess knowledge level of disease process/condition, treatment plan, diagnostic tests and medications  4.  Explain disease process/condition, treatment plan, diagnostic tests and medications  Outcome: Progressing     Problem: Safety  Goal: Will remain free from injury  Outcome: Progressing  Goal: Will remain free from falls  Outcome: Progressing     Problem: Mobility  Goal: Risk for activity  "season and struggling with feelings of anxiety and depression. Family hx of depression. Denies karina suicidal thoughts/plans. Denies any panic attacks.     No side effects of OCPs. Sexually active, using condoms.     Allergic rhinitis- well controlled on Zyrtec as needed.    Review of Nutrition:  Current diet: Variety of meats, fruits, vegetables, and grains. Drinks gatorade water   Balanced diet? yes  Exercise: active, dance   Dentist: Dental home, brushes teeth daily   Menstrual Problems: LMP10/15/2020, regular, no cyclic symptoms.     Social Screening:  Sibling relations: sisters: 1  Discipline concerns? no  Concerns regarding behavior with peers? no  School performance: doing well; no concerns  Grade: 12th at [employer]  Secondhand smoke exposure? no    Helmet Use:  Yes   Seat Belt Us:  Yes   Safe Driving:  Yes   Sunscreen Use:  Yes    Smoke Detectors:  Yes     PHQ-2 Depression Screening  Little interest or pleasure in doing things? 0   Feeling down, depressed, or hopeless? 2   PHQ-2 Total Score 14         The patient denies smoking cigarettes (including electronic cigarettes), smokeless tobacco, alcohol use, illicit drug use, tattoos, body piercing other than ears, anorexia, bulimia, depression, anxiety,  sexual activity.          /76   Ht 157.5 cm (62\")   Wt 76.2 kg (168 lb)   BMI 30.73 kg/m²     Growth parameters are noted and are appropriate for age.     Physical Exam  Constitutional:       General: She is not in acute distress.     Appearance: Normal appearance. She is well-developed and well-groomed. She is not ill-appearing or toxic-appearing.   HENT:      Head: Normocephalic and atraumatic.      Right Ear: Tympanic membrane, ear canal and external ear normal.      Left Ear: Tympanic membrane, ear canal and external ear normal.      Nose: Nose normal.      Mouth/Throat:      Lips: Pink.      Mouth: Mucous membranes are moist.      Tongue: No lesions.      Pharynx: Oropharynx is clear. " intolerance will decrease  Outcome: Progressing     Problem: Skin Integrity  Goal: Skin integrity is maintained or improved  Description: Target End Date:  Prior to discharge or change in level of care    Document interventions on Skin Risk/C[place]ell flowsheet groups and corresponding LDA    1.  Assess and monitor skin integrity, appearance and/or temperature  2.  Assess risk factors for impaired skin integrity and/or pressures ulcers  3.  Implement precautions to protect skin integrity in collaboration with interdisciplinary team  4.  Implement pressure ulcer prevention protocol if at risk for skin breakdown  5.  Confirm wound care consult if at risk for skin breakdown  6.  Ensure patient use of pressure relieving devices  (Low air loss bed, waffle overlay, heel protectors, ROHO cushion, etc)  Outcome: Progressing     Problem: Fall Risk  Goal: Patient will remain free from falls  Description: Target End Date:  Prior to discharge or change in level of care    Document interventions on the Sonya Elam Fall Risk Assessment    1.  Assess for fall risk factors  2.  Implement fall precautions  Outcome: Progressing        Eyes:      General: Lids are normal.      Extraocular Movements: Extraocular movements intact.      Conjunctiva/sclera: Conjunctivae normal.      Pupils: Pupils are equal, round, and reactive to light.   Neck:      Musculoskeletal: Normal range of motion and neck supple.   Cardiovascular:      Rate and Rhythm: Normal rate and regular rhythm.      Pulses: Normal pulses.      Heart sounds: Normal heart sounds.   Pulmonary:      Effort: Pulmonary effort is normal.      Breath sounds: Normal breath sounds.   Abdominal:      General: Bowel sounds are normal.      Palpations: Abdomen is soft. There is no mass.      Tenderness: There is no abdominal tenderness. There is no guarding or rebound.   Musculoskeletal: Normal range of motion.      Comments: Negative scoliosis    Skin:     General: Skin is warm.      Capillary Refill: Capillary refill takes less than 2 seconds.      Findings: No rash.   Neurological:      General: No focal deficit present.      Mental Status: She is alert and oriented to person, place, and time.      Cranial Nerves: Cranial nerves are intact. No cranial nerve deficit.      Motor: Motor function is intact. No abnormal muscle tone.      Coordination: Coordination is intact. Coordination normal.      Gait: Gait is intact. Gait normal.      Deep Tendon Reflexes: Reflexes are normal and symmetric. Reflexes normal.   Psychiatric:         Mood and Affect: Mood normal.         Behavior: Behavior normal. Behavior is cooperative.                 Healthy 15 y.o.  well adolescent.        1. Anticipatory guidance discussed.  Gave handout on well-child issues at this age.    The patient was counseled regarding stranger safety, gun safety, seatbelt use, sunscreen use, and helmet use.  Discussed safe driving including no texting while driving.  The patient was instructed not to use drugs, inhalants, cigarettes or e-cigarettes, smokeless tobacco, or alcohol.  Risks of dependence, tolerance, and addiction were  discussed.  Counseling was given on sexual activity to include protection from pregnancy and sexually transmitted diseases (including condom use).  Discussed appropriate social media use.  Encouraged to limit screen time to <2hrs daily and aim for one hour of physical activity each day.  Encouraged to use proper athletic personal safety gear.    2.  Weight management:  The patient was counseled regarding nutrition and physical activity.    3. Development: appropriate for age    4. Anxiety/Depression: PHQ2 score 14. Discussed treatment options. Patient desires to trial medication. Declines referral for counseling as this time. Discussed Zoloft risk/benefits and common side effects. Trial Zoloft 50 mg nightly. Follow up in office in 8 weeks, sooner if needed., Patient and guardian agree to immediately report any suicidal thoughts or plans.     5. Dysmenorrhea: Continue Loestrin as you are. Reviewed taking OCPs correctly, what to do if misses dose. Reviewed safe sex practice and use of condoms.     6. Allergic rhinitis: Reviewed common triggers and supportive measures, Continue Zyrtec nightly as needed for rhinitis.     7. Immunizations today Influenza.    Immunizations: discussed risk/benefits to vaccination, reviewed components of the vaccine, discussed VIS, discussed informed consent and informed consent obtained. Patient was allowed to accept or refuse vaccine. Questions answered to satisfactory state of patient. We reviewed typical age appropriate and seasonally appropriate vaccinations. Reviewed immunization history and updated state vaccination form as needed        Orders Placed This Encounter   Procedures   • FluLaval Quad >6 Months (4124-9201)         Return in about 8 weeks (around 12/29/2020), or if symptoms worsen or fail to improve, for Recheck.

## 2024-11-02 NOTE — PROGRESS NOTES
Hospital Medicine Daily Progress Note    Date of Service  11/2/2024    Chief Complaint  Left ankle pain    Hospital Course  Lorene Haro is a 49-year-old morbidly obese female who 3 days ago had rolled her ankle while she was bowling and had left ankle pain.  She was worked up and found to have on CT a vertically oriented fracture of the talus subacute to chronic.  Orthopedics was consulted and recommended outpatient surgery in 10 days.  However due to the medical complexities and inability of the patient to manage on her own at home orthopedics were reconsulted on 10/31 with the emergency room physician alerting us that orthopedist request inpatient admit to have surgical repair.    Interval Problem Update  10/31/24: Patient states that over the past 3 weeks she has been noticing left heel pain but still been getting around on it.  She bowls with [employer] and was at her bowling event and was having left heel pain and looked down and saw to have significant bruising of the left ankle.  She sought care thereafter here in the emergency room.  Imaging showed vertical oriented fracture.  Patient currently with 8 out of 10 pain.  She is alert and oriented.  No fevers.  The patient does not remember any specific traumas to her left ankle  11/1/24. Patient seen and examine at bedside  Orthopedics aware, discussed with Cristo.He is reaching out to see exact schedule for surgery. At this time if it is going to be more than a week, SNF recommended in the interim. Eliquis is HELD (on it for Afib).  Patient otherwise stable, pain contolled.no new issues or concerns. Monitor mentation/sedation score, respirations blood pressure and bowel regimen if on narcotics.  11/2/24.Discussed with patient and nursing staff. Plan for surgery TODAY. She is not agitated, pain controlled but she seems fatigued. She is a former smoker but only 2 weeks ago. I spent 4 minutes, discussing tobacco dependence and cardiac as well as  pulmonary risk. Smoking cessation instructions. Code 48691 I mentioned nicotine retards healing.    I reviewed the chart along with vitals, labs, imaging, test (both pending and resulted) and recommendations from specialists and interdisciplinary team.  I have discussed this patient's plan of care and discharge plan at IDT rounds today with Case Management, Nursing, Nursing leadership, and other members of the IDT team.    Consultants/Specialty  orthopedics    Code Status  Full Code    Disposition  The patient is not medically cleared for discharge to home or a post-acute facility.      I have placed the appropriate orders for post-discharge needs.    Review of Systems  Review of Systems   Constitutional:  Negative for fever and malaise/fatigue.   Respiratory:  Negative for shortness of breath.    Cardiovascular:  Negative for chest pain and leg swelling.   Gastrointestinal:  Negative for abdominal pain and nausea.   Musculoskeletal:  Positive for joint pain. Negative for falls.   Neurological:  Negative for dizziness and headaches.   Psychiatric/Behavioral:  The patient is not nervous/anxious.         Physical Exam  Temp:  [36.2 °C (97.1 °F)-37.3 °C (99.1 °F)] 37 °C (98.6 °F)  Pulse:  [60-85] 84  Resp:  [10-21] 17  BP: ()/(38-66) 126/66  SpO2:  [93 %-98 %] 98 %    Physical Exam  Vitals reviewed.   Constitutional:       Appearance: She is obese. She is not ill-appearing.   HENT:      Head: Normocephalic and atraumatic.      Nose: No congestion.      Mouth/Throat:      Mouth: Mucous membranes are moist.   Eyes:      Extraocular Movements: Extraocular movements intact.      Conjunctiva/sclera: Conjunctivae normal.   Cardiovascular:      Rate and Rhythm: Normal rate and regular rhythm.      Heart sounds: No murmur heard.  Pulmonary:      Effort: No respiratory distress.      Breath sounds: Normal breath sounds. No wheezing.   Abdominal:      General: Bowel sounds are normal. There is no distension.       Palpations: Abdomen is soft.      Tenderness: There is no abdominal tenderness.   Musculoskeletal:      Right lower leg: No edema.      Left lower leg: No edema.   Lymphadenopathy:      Cervical: No cervical adenopathy.   Skin:     General: Skin is dry.   Neurological:      General: No focal deficit present.      Mental Status: She is alert and oriented to person, place, and time.      Cranial Nerves: No cranial nerve deficit.      Motor: No weakness.   Psychiatric:         Mood and Affect: Mood normal.         Thought Content: Thought content normal.         Fluids    Intake/Output Summary (Last 24 hours) at 11/2/2024 1648  Last data filed at 11/2/2024 1523  Gross per 24 hour   Intake 215 ml   Output 3410 ml   Net -3195 ml        Laboratory  Recent Labs     11/02/24  0449   WBC 7.8   RBC 4.22   HEMOGLOBIN 11.3*   HEMATOCRIT 37.3   MCV 88.4   MCH 26.8*   MCHC 30.3*   RDW 51.4*   PLATELETCT 210   MPV 11.7     Recent Labs     11/02/24  0449   SODIUM 140   POTASSIUM 4.6   CHLORIDE 103   CO2 27   GLUCOSE 103*   BUN 16   CREATININE 0.78   CALCIUM 9.2                   Imaging  DX-PORTABLE FLUORO > 1 HOUR   Final Result      Intraoperative images intended for localization and not for diagnostic purposes demonstrate ORIF of the talus. See procedure report for details.      Fluoroscopy time: 0 minutes 30.7 seconds   Fluoroscopy dose: .4395 Gycm2   Fluoroscopy images: 3      DX-ANKLE 2- VIEWS LEFT   Final Result      Intraoperative images intended for localization and not for diagnostic purposes demonstrate ORIF of the talus. See procedure report for details.      Fluoroscopy time: 0 minutes 30.7 seconds   Fluoroscopy dose: .4395 Gycm2   Fluoroscopy images: 3      CT-FOOT W/O PLUS RECONS LEFT   Final Result         1. As noted on the prior conventional radiographs, there is a vertically oriented fracture of the talus. This appears subacute to chronic.   2. Osteopenia.   3. Osteoarthrosis.   4. Muscular atrophy.      DX-ANKLE  3+ VIEWS LEFT   Final Result      New fracture of the posterior talus.           Assessment/Plan  * Talus fracture  Assessment & Plan  CT imaging showing a subacute fracture of the left talus bone  Orthopedist has changed her mind and wanted patient admitted for consideration of surgical intervention  Vitamin D and calcium have been ordered   oxycodone as needed for pain  PT/OT status post surgery  Discussed with patient discussed with surgery recommend ice packing on and off to help with swelling and pain  Weightbearing per postsurgical recommendations from Ortho    Ortho surgery today    Asthma-COPD overlap syndrome (HCC)- (present on admission)  Assessment & Plan  Continue home inhalers  No longer smoking    Hyperlipidemia- (present on admission)  Assessment & Plan  Lipitor    AF (atrial fibrillation) (HCC)- (present on admission)  Assessment & Plan  Continue metoprolol  Holding Eliquis for potential surgery    Vitals:    11/02/24 1640   BP: 126/66   Pulse: 84   Resp: 17   Temp: 37 °C (98.6 °F)   SpO2: 98%     Stable BP    Morbid obesity (HCC)- (present on admission)  Assessment & Plan  BMI 51  Recommended weight loss advised, 5% through reduced calorie, low carb diet and 150 mins of exercise a week once better  Recommend bariatric surgery evaluation if morbidly obese  Educated on the increase of morbidity and mortality associated with excess weight including DM, Heart Disease, HTN, stroke, and sleep apnea. Recommended outpatient monitoring  of blood sugars, lipid panel, sleep study evaluation for metabolic syndrome.           VTE prophylaxis: Eliquis help preproc    I have performed a physical exam and reviewed and updated ROS and Plan today (11/2/2024). In review of yesterday's note (11/1/2024), there are no changes except as documented above.

## 2024-11-02 NOTE — OP REPORT
DATE OF OPERATION: 11/2/2024     PREOPERATIVE DIAGNOSIS: Left talar body fracture    POSTOPERATIVE DIAGNOSIS: Same    PROCEDURE PERFORMED: Open treatment of left talar body fracture with internal fixation    SURGEON: Roman Rosa M.D.     ASSISTANT: none    ANESTHESIA: General    ESTIMATED BLOOD LOSS: 0 mL    INDICATIONS: The patient is a 49 y.o. female with a left talar body fracture resulting from a bowling fall.  The patient denies antecedent pain, and was found to have a normal neurovascular exam and skin envelope.  Radiographs reviewed by myself demonstrated a left talar body fracture.  Given these findings, the patient is a candidate for surgical treatment of the talus fracture with internal fixation.  I discussed the risks and benefits of the procedure, including the possibility of infection, wound healing complication, neurovascular injury, pain, malunion, nonunion, need for revision surgery, and the medical risks of anesthesia including DVT, PE, MI, stroke, and death.  Benefits include early mobilization and reduction in the risks of post traumatic arthritis, chronic pain and deformity associated with talus fractures.  Alternatives to surgery were also discussed, including non-operative management.  The patient was in agreement with the plan to proceed, the informed consent was signed and documented and the operative extremity was marked.      PROCEDURE:  The patient was sedated with general anesthesia, and positioned in the lateral decubitus position on a beanbag with all bony prominences well padded and an axillary roll placed.  Perioperative antibiotics were administered. Sequential compression devices were employed to non-operative limb.  The correct operative site was prepped and draped into a sterile field.  A procedural pause was conducted to verify correct patient, correct extremity, and presence of the surgeons initials on the operative extremity.    Under fluoroscopic guidance to posterior  to anterior K wires for [place] 5.0 partially-threaded cannulated screws were inserted under fluoroscopic guidance.  2 screws were placed with good compression and found to be within bone throughout the course.  Good bony alignment was obtained. The skin was then closed with 3-0 nylon interrupted horizontal mattress sutures. Sterile dressings were applied. Boot was applied.    Post-Operative Plan:    1.  The patient should be toe touch weightbearing.  Gait aids (crutch or crutches, cane, walker) may be used as needed, and may be discontinued when no longer required.  2.  IV antibiotics - none  3.  DVT prophylaxis - SCD's in recovery, otherwise none required  4.  Discharge home when criteria met.  Follow-up at the [facility] in 10 days - 2 weeks.    ____________________________________   Roman Rosa M.D.   11/2/2024  3:13 PM

## 2024-11-02 NOTE — ANESTHESIA POSTPROCEDURE EVALUATION
Patient: Lorene Haro    Procedure Summary       Date: 11/02/24 Room / Location: [place] [address] / SURGERY [facility]    Anesthesia Start: 1427 Anesthesia Stop: 1524    Procedure: ORIF, ANKLE- TALUS (Left) Diagnosis: (Left talar body fracture)    Surgeons: Roman Rosa M.D. Responsible Provider: Lazaro Rothman M.D.    Anesthesia Type: general ASA Status: 3            Final Anesthesia Type: general  Last vitals  BP   Blood Pressure: 125/58    Temp   37.3 °C (99.1 °F)    Pulse   82   Resp   (!) 10    SpO2   94 %      Anesthesia Post Evaluation    Patient location during evaluation: PACU  Patient participation: complete - patient participated  Level of consciousness: awake and alert  Pain score: 0    Airway patency: patent  Anesthetic complications: no  Cardiovascular status: hemodynamically stable  Respiratory status: acceptable  Hydration status: euvolemic    PONV: none          No notable events documented.     Nurse Pain Score: 8 (NPRS)

## 2024-11-02 NOTE — CARE PLAN
The patient is Stable - Low risk of patient condition declining or worsening    Shift Goals  Clinical Goals: pain, surgery, mobility after surgery  Patient Goals: surgery  Family Goals: na    Progress made toward(s) clinical / shift goals:    Problem: Pain - Standard  Goal: Alleviation of pain or a reduction in pain to the patient’s comfort goal  Outcome: Progressing  Note: Patient educated on 0-10 pain scale, available pain medications, and non-pharmacological methods of pain management. Verbalizes understanding. See MAR.        Problem: Knowledge Deficit - Standard  Goal: Patient and family/care givers will demonstrate understanding of plan of care, disease process/condition, diagnostic tests and medications  Outcome: Progressing  Note: Patient updated on POC, including surgery time and potential PT/OT tomorrow. No concerns at this time. Personal belongings and call light within reach.        Problem: Safety  Goal: Will remain free from injury  Outcome: Progressing  Goal: Will remain free from falls  Outcome: Progressing     Problem: Mobility  Goal: Risk for activity intolerance will decrease  Outcome: Progressing     Problem: Skin Integrity  Goal: Skin integrity is maintained or improved  Outcome: Progressing     Problem: Fall Risk  Goal: Patient will remain free from falls  Outcome: Progressing  Note: Fall risk interventions in place for high fall risk patient, including: bed alarm, 3 bed rails up, DME out of room, hourly rounding, room free of clutter. Patient educated on using call light and to wait for staff to be in the room before attempting to mobilize. Verbalizes understanding          Patient is not progressing towards the following goals:

## 2024-11-02 NOTE — CONSULTS
11/2/2024    The patient was seen at the request of Dr Callahan    HPI: Lorene Haro is a 49 y.o. female who presents with complaints of pain to left foot.  This started 3 days ago after fall while bowling.  The pain is 8/10 and is described as sharp.  The pain is made worse by palpation of the area and made better by rest and immobilization.  She did injure this about a year ago    Past Medical History:   Diagnosis Date    A-fib ([facility])     MONI (acute kidney injury) ([facility]) 08/09/2023    Asthma     Bipolar 2 disorder ([facility])     Chickenpox     Chronic obstructive pulmonary disease ([facility])     Congestive heart failure ([facility])     Hypertension     On home oxygen therapy     Other psychological stress     Schizophrenic disorder ([facility])     Yeast infection of the skin 04/01/2021       Past Surgical History:   Procedure Laterality Date    CHOLECYSTECTOMY      COLECTOMY      HYSTERECTOMY LAPAROSCOPY      KNEE ARTHROSCOPY Left        Medications  No current facility-administered medications on file prior to encounter.     Current Outpatient Medications on File Prior to Encounter   Medication Sig Dispense Refill    nicotine (NICODERM) 21 MG/24HR PATCH 24 HR Place 1 Patch on the skin every 24 hours.      ascorbic acid (VITAMIN C) 500 MG tablet Take 500 mg by mouth every day.      doxycycline (VIBRAMYCIN) 100 MG Tab Take 100 mg by mouth 2 times a day. 5 day course started 10/16/2024      ferrous sulfate 325 (65 Fe) MG tablet Take 325 mg by mouth every 48 hours.      ketoconazole (NIZORAL) 2 % Cream Apply 1 Application topically 2 times a day.      acetaminophen (TYLENOL) 325 MG Tab Take 650 mg by mouth every four hours as needed for Moderate Pain.      ibuprofen (MOTRIN) 200 MG Tab Take 400 mg by mouth every 6 hours as needed for Mild Pain.      naproxen (NAPROSYN) 500 MG Tab Take 1 Tablet by mouth 2 times a day with meals. 60 Tablet 0    lidocaine (LIDODERM) 5 % Patch Place 1 Patch on the skin every 12 hours.      ADVAIR DISKUS  100-50 MCG/ACT AEROSOL POWDER, BREATH ACTIVATED Inhale 1 Puff 2 times a day. 180 Each 3    montelukast (SINGULAIR) 10 MG Tab Take 1 Tablet by mouth every evening. 90 Tablet 3    metoprolol SR (TOPROL XL) 25 MG TABLET SR 24 HR Take 25 mg by mouth every day.      apixaban (ELIQUIS) 5mg Tab Take 1 Tablet by mouth 2 times a day. 200 Tablet 3    fluticasone (FLONASE) 50 MCG/ACT nasal spray Administer 2 Sprays into each nostril every morning.      aripiprazole (ABILIFY) 30 MG tablet Take 1 Tablet by mouth every morning. Indications: Major Depressive Disorder 30 Tablet 0    atorvastatin (LIPITOR) 20 MG Tab Take 1 Tablet by mouth at bedtime. 30 Tablet 0    nicotine (NICODERM) 7 MG/24HR PATCH 24 HR       nicotine (NICODERM) 14 MG/24HR PATCH 24 HR Place 1 Patch on the skin every 24 hours.      Home Care Oxygen Inhale 2 L/min every evening. DME = Preferred         Allergies  Amoxicillin and Penicillin g    ROS  Left foot pain. All other systems were reviewed and found to be negative    Family History   Problem Relation Age of Onset    No Known Problems Mother     Cancer Sister        Social History     Socioeconomic History    Marital status: Single   Tobacco Use    Smoking status: Former     Current packs/day: 0.00     Types: Cigarettes     Quit date: 10/12/2022     Years since quittin.0    Smokeless tobacco: Never   Vaping Use    Vaping status: Never Used   Substance and Sexual Activity    Alcohol use: Not Currently    Drug use: Never    Sexual activity: Not Currently   Social History Narrative    ** Merged History Encounter **         From [place]     Social Determinants of Health     Financial Resource Strain: Medium Risk (2022)    Overall Financial Resource Strain (CARDIA)     Difficulty of Paying Living Expenses: Somewhat hard   Food Insecurity: No Food Insecurity (10/31/2024)    Hunger Vital Sign     Worried About Running Out of Food in the Last Year: Never true     Ran Out of Food in the Last Year: Never true  "  Transportation Needs: No Transportation Needs (10/31/2024)    PRAPARE - Transportation     Lack of Transportation (Medical): No     Lack of Transportation (Non-Medical): No   Intimate Partner Violence: Not At Risk (10/31/2024)    Humiliation, Afraid, Rape, and Kick questionnaire     Fear of Current or Ex-Partner: No     Emotionally Abused: No     Physically Abused: No     Sexually Abused: No   Housing Stability: Low Risk  (10/31/2024)    Housing Stability Vital Sign     Unable to Pay for Housing in the Last Year: No     Number of Times Moved in the Last Year: 1     Homeless in the Last Year: No       Physical Exam  Vitals  /48   Pulse 68   Temp 36.2 °C (97.1 °F) (Temporal)   Resp 18   Ht 1.549 m (5' 1\")   Wt 124 kg (273 lb)   SpO2 94%   General: Well Developed, Well Nourished, Age appropriate appearance  HEENT: Normocephalic, atraumatic  Psych: Normal mood and affect  Neck: Supple, nontender, no masses  Lungs: Breathing unlabored, No audible wheezing  Heart: Regular heart rate and rhythm  Abdomen: Soft, NT, ND  Neuro: Sensation grossly intact to BUE and BLE, moving all four extremities  Skin: Intact, no open wounds  Vascular: 2+ dorsalis pedis and posterior tibial, Capillary refill <2 seconds  MSK: Left ankle pain and swelling      Radiographs:  Left talar body fracture acute on chronic  CT-FOOT W/O PLUS RECONS LEFT   Final Result         1. As noted on the prior conventional radiographs, there is a vertically oriented fracture of the talus. This appears subacute to chronic.   2. Osteopenia.   3. Osteoarthrosis.   4. Muscular atrophy.      DX-ANKLE 3+ VIEWS LEFT   Final Result      New fracture of the posterior talus.      DX-PORTABLE FLUORO > 1 HOUR    (Results Pending)   DX-ANKLE 2- VIEWS LEFT    (Results Pending)       Laboratory Values  Recent Labs     11/02/24  0449   WBC 7.8   RBC 4.22   HEMOGLOBIN 11.3*   HEMATOCRIT 37.3   MCV 88.4   MCH 26.8*   MCHC 30.3*   RDW 51.4*   PLATELETCT 210   MPV 11.7 "     Recent Labs     11/02/24  0449   SODIUM 140   POTASSIUM 4.6   CHLORIDE 103   CO2 27   GLUCOSE 103*   BUN 16             Impression: Left talar body fracture    Plan:We discussed the diagnosis and findings with the patient at length.  We reviewed possible non operative and operative interventions and the risks and benefits of each of these.  she had a chance to ask questions and all of these were answered to her satisfaction. The patient chose to proceed with operative fixation including all indicated procedures. Risks and benefits of surgery were discussed which include but are not limited to pain,bleeding, infection, neurovascular damage, avascular necrosis, malunion,, need for additional surgery, DVT, PE, MI, Stroke and death. They understand these risks and wish to proceed.

## 2024-11-02 NOTE — OR NURSING
1521 - Patient arrived from OR via bed. Report received from Anesthesia and Nursing staff. Vitals stable, no distress noted, orders reviewed and released.     1630 - Patient transported to room via bed accompanied by transport. Vitals stable, no distress noted. Report given to JC Kendall.

## 2024-11-02 NOTE — ANESTHESIA TIME REPORT
Anesthesia Start and Stop Event Times       Date Time Event    11/2/2024 1415 Ready for Procedure     1427 Anesthesia Start     1524 Anesthesia Stop          Responsible Staff  11/02/24      Name Role Begin End    Lazaro Rothman M.D. Anesth 1427 1524          Overtime Reason:  no overtime (within assigned shift)    Comments:

## 2024-11-02 NOTE — PROGRESS NOTES
Tall medium Cam boot placed on pt LLE. Pt tolerating boot well at this time    Contact [employer] for any questions or concerns.

## 2024-11-02 NOTE — ANESTHESIA PROCEDURE NOTES
Airway    Date/Time: 11/2/2024 2:39 PM    Performed by: Lazaro Rothman M.D.  Authorized by: Lazaro Rothman M.D.    Location:  OR  Urgency:  Elective  Difficult Airway: No    Indications for Airway Management:  Anesthesia      Spontaneous Ventilation: absent    Sedation Level:  Deep  Preoxygenated: Yes    Patient Position:  Sniffing  Mask Difficulty Assessment:  2 - vent by mask + OA or adjuvant +/- NMBA  Final Airway Type:  Endotracheal airway  Final Endotracheal Airway:  ETT  Cuffed: Yes    Technique Used for Successful ETT Placement:  Direct laryngoscopy  Devices/Methods Used in Placement:  Intubating stylet    Insertion Site:  Oral  Blade Type:  [place]  Laryngoscope Blade/Videolaryngoscope Blade Size:  2  ETT Size (mm):  7.5  Measured from:  Gums  ETT to Gums (cm):  21  Placement Verified by: auscultation and capnometry    Cormack-Lehane Classification:  Grade I - full view of glottis  Number of Attempts at Approach:  1

## 2024-11-02 NOTE — ANESTHESIA PREPROCEDURE EVALUATION
Case: [id number] Date/Time: 11/02/24 1530    Procedure: ORIF, ANKLE- TALUS (Left)    Location: [facility] / SURGERY [facility]    Surgeons: Roman Rosa M.D.            Relevant Problems   ANESTHESIA   (positive) KATHIA (obstructive sleep apnea)      PULMONARY   (positive) ?Asthma   (positive) Asthma-COPD overlap syndrome (HCC)   (positive) Atypical pneumonia   (positive) COPD exacerbation (HCC)   (positive) Pneumonia   (positive) Pneumonia due to COVID-19 virus   (positive) Severe persistent asthma without complication   (positive) Shortness of breath + Somnolence      CARDIAC   (positive) AF (atrial fibrillation) ([facility])   (positive) Sinus pause   (positive) Stasis dermatitis of both legs      GI   (positive) Gastroesophageal reflux disease without esophagitis         (positive) Cirrhosis (HCC)      Other   (positive) BMI 50.0-59.9, adult ([facility])   (positive) Closed left ankle fracture, initial encounter   (positive) Morbid obesity (HCC)       Physical Exam    Airway   Mallampati: IV  TM distance: >3 FB  Neck ROM: full       Cardiovascular - normal exam  Rhythm: regular  Rate: normal  (-) murmur     Dental - normal exam           Pulmonary - normal exam  (+) rhonchi     Abdominal    Neurological - normal exam                   Anesthesia Plan    ASA 3   ASA physical status 3 criteria: morbid obesity - BMI greater than or equal to 40 and COPD - poorly controlled    Plan - general       Airway plan will be ETT          Induction: intravenous    Postoperative Plan: Postoperative administration of opioids is intended.    Pertinent diagnostic labs and testing reviewed    Informed Consent:    Anesthetic plan and risks discussed with patient.    Use of blood products discussed with: patient whom consented to blood products.

## 2024-11-03 ENCOUNTER — NON-PROVIDER VISIT (OUTPATIENT)
Dept: CARDIOLOGY | Facility: MEDICAL CENTER | Age: 49
End: 2024-11-03
Payer: MEDICAID

## 2024-11-03 VITALS
HEIGHT: 61 IN | BODY MASS INDEX: 51.54 KG/M2 | RESPIRATION RATE: 17 BRPM | HEART RATE: 69 BPM | DIASTOLIC BLOOD PRESSURE: 53 MMHG | WEIGHT: 273 LBS | TEMPERATURE: 98.1 F | OXYGEN SATURATION: 94 % | SYSTOLIC BLOOD PRESSURE: 120 MMHG

## 2024-11-03 LAB — IMM ASSAY OCC BLD FITOB: POSITIVE

## 2024-11-03 PROCEDURE — 700102 HCHG RX REV CODE 250 W/ 637 OVERRIDE(OP): Performed by: STUDENT IN AN ORGANIZED HEALTH CARE EDUCATION/TRAINING PROGRAM

## 2024-11-03 PROCEDURE — A9270 NON-COVERED ITEM OR SERVICE: HCPCS | Performed by: INTERNAL MEDICINE

## 2024-11-03 PROCEDURE — 700102 HCHG RX REV CODE 250 W/ 637 OVERRIDE(OP): Performed by: INTERNAL MEDICINE

## 2024-11-03 PROCEDURE — A9270 NON-COVERED ITEM OR SERVICE: HCPCS | Performed by: STUDENT IN AN ORGANIZED HEALTH CARE EDUCATION/TRAINING PROGRAM

## 2024-11-03 PROCEDURE — A9270 NON-COVERED ITEM OR SERVICE: HCPCS | Performed by: HOSPITALIST

## 2024-11-03 PROCEDURE — 99239 HOSP IP/OBS DSCHRG MGMT >30: CPT | Performed by: INTERNAL MEDICINE

## 2024-11-03 PROCEDURE — 700102 HCHG RX REV CODE 250 W/ 637 OVERRIDE(OP): Performed by: HOSPITALIST

## 2024-11-03 RX ORDER — OXYCODONE HYDROCHLORIDE 5 MG/1
5 TABLET ORAL EVERY 6 HOURS PRN
Qty: 12 TABLET | Refills: 0 | Status: SHIPPED | OUTPATIENT
Start: 2024-11-03 | End: 2024-11-03

## 2024-11-03 RX ORDER — OXYCODONE HYDROCHLORIDE 5 MG/1
5 TABLET ORAL EVERY 6 HOURS PRN
Qty: 12 TABLET | Refills: 0 | OUTPATIENT
Start: 2024-11-03 | End: 2024-11-06

## 2024-11-03 RX ORDER — BISACODYL 10 MG
10 SUPPOSITORY, RECTAL RECTAL DAILY
Status: DISCONTINUED | OUTPATIENT
Start: 2024-11-03 | End: 2024-11-03

## 2024-11-03 RX ORDER — ENOXAPARIN SODIUM 100 MG/ML
40 INJECTION SUBCUTANEOUS DAILY
Qty: 2 EACH | Refills: 0 | Status: ACTIVE | OUTPATIENT
Start: 2024-11-03 | End: 2024-11-05

## 2024-11-03 RX ORDER — BISACODYL 10 MG
10 SUPPOSITORY, RECTAL RECTAL ONCE
Status: COMPLETED | OUTPATIENT
Start: 2024-11-03 | End: 2024-11-03

## 2024-11-03 RX ORDER — CALCIUM CARBONATE 500(1250)
500 TABLET ORAL 2 TIMES DAILY WITH MEALS
Status: SHIPPED
Start: 2024-11-03

## 2024-11-03 RX ORDER — POLYETHYLENE GLYCOL 3350 17 G/17G
17 POWDER, FOR SOLUTION ORAL
Status: SHIPPED
Start: 2024-11-03

## 2024-11-03 RX ORDER — MULTIVIT-MIN/IRON/FOLIC ACID/K 18-600-40
2000 CAPSULE ORAL DAILY
Qty: 60 TABLET | Refills: 0 | Status: SHIPPED | OUTPATIENT
Start: 2024-11-03

## 2024-11-03 RX ORDER — CYCLOBENZAPRINE HCL 10 MG
10 TABLET ORAL 3 TIMES DAILY PRN
Qty: 15 TABLET | Refills: 1 | Status: SHIPPED | OUTPATIENT
Start: 2024-11-03

## 2024-11-03 RX ORDER — OXYCODONE HYDROCHLORIDE 5 MG/1
5 TABLET ORAL EVERY 6 HOURS PRN
Qty: 12 TABLET | Refills: 0 | Status: SHIPPED | OUTPATIENT
Start: 2024-11-03 | End: 2024-11-06

## 2024-11-03 RX ORDER — AMOXICILLIN 250 MG
2 CAPSULE ORAL EVERY EVENING
Status: SHIPPED
Start: 2024-11-03

## 2024-11-03 RX ORDER — ENOXAPARIN SODIUM 100 MG/ML
40 INJECTION SUBCUTANEOUS DAILY
Status: DISCONTINUED | OUTPATIENT
Start: 2024-11-03 | End: 2024-11-03 | Stop reason: HOSPADM

## 2024-11-03 RX ORDER — HYDROXYZINE HYDROCHLORIDE 25 MG/1
25 TABLET, FILM COATED ORAL 3 TIMES DAILY PRN
Status: SHIPPED
Start: 2024-11-03

## 2024-11-03 RX ORDER — GABAPENTIN 300 MG/1
300 CAPSULE ORAL 3 TIMES DAILY
Status: SHIPPED
Start: 2024-11-03

## 2024-11-03 RX ADMIN — OXYCODONE 5 MG: 5 TABLET ORAL at 08:06

## 2024-11-03 RX ADMIN — CYCLOBENZAPRINE 10 MG: 10 TABLET, FILM COATED ORAL at 04:40

## 2024-11-03 RX ADMIN — GABAPENTIN 300 MG: 300 CAPSULE ORAL at 02:30

## 2024-11-03 RX ADMIN — OXYCODONE 5 MG: 5 TABLET ORAL at 02:23

## 2024-11-03 RX ADMIN — MOMETASONE FUROATE AND FORMOTEROL FUMARATE DIHYDRATE 1 PUFF: 200; 5 AEROSOL RESPIRATORY (INHALATION) at 02:30

## 2024-11-03 RX ADMIN — GABAPENTIN 300 MG: 300 CAPSULE ORAL at 09:40

## 2024-11-03 RX ADMIN — BISACODYL 10 MG: 10 SUPPOSITORY RECTAL at 10:51

## 2024-11-03 RX ADMIN — OXYCODONE 5 MG: 5 TABLET ORAL at 15:09

## 2024-11-03 RX ADMIN — ARIPIPRAZOLE 30 MG: 15 TABLET ORAL at 04:40

## 2024-11-03 RX ADMIN — CALCIUM 500 MG: 500 TABLET ORAL at 07:43

## 2024-11-03 RX ADMIN — CHOLECALCIFEROL TAB 125 MCG (5000 UNIT) 5000 UNITS: 125 TAB at 04:40

## 2024-11-03 ASSESSMENT — PAIN DESCRIPTION - PAIN TYPE
TYPE: ACUTE PAIN

## 2024-11-03 NOTE — CARE PLAN
The patient is Stable - Low risk of patient condition declining or worsening    Shift Goals  Clinical Goals: pain, bm  Patient Goals: pain, bm  Family Goals: na    Progress made toward(s) clinical / shift goals:    Problem: Pain - Standard  Goal: Alleviation of pain or a reduction in pain to the patient’s comfort goal  Outcome: Met  Note: Patient educated on 0-10 pain scale, available pain medications, and non-pharmacological methods of pain management. Verbalizes understanding. See MAR.       Problem: Knowledge Deficit - Standard  Goal: Patient and family/care givers will demonstrate understanding of plan of care, disease process/condition, diagnostic tests and medications  Outcome: Met  Note: Patient updated on POC, including discharge to SNF and mobility. No concerns at this time. Personal belongings and call light within reach.        Problem: Safety  Goal: Will remain free from injury  Outcome: Met  Goal: Will remain free from falls  Outcome: Met     Problem: Mobility  Goal: Risk for activity intolerance will decrease  Outcome: Met  Note: Patient up to commode and chair multiple times.      Problem: Skin Integrity  Goal: Skin integrity is maintained or improved  Outcome: Met     Problem: Fall Risk  Goal: Patient will remain free from falls  Outcome: Met  Note: Patient educated on using call light and to wait for staff to be in the room before attempting to mobilize. Verbalizes understanding          Patient is not progressing towards the following goals:

## 2024-11-03 NOTE — DISCHARGE SUMMARY
Discharge Summary    CHIEF COMPLAINT ON ADMISSION  Chief Complaint   Patient presents with    Ankle Pain     Left ankle pain x 3 days. Pt reports she was bowling when she rolled her ankle, denies fall.     Low Back Pain     X 1 day, denies trauma        Reason for Admission  leg pain    Admission Date  10/30/2024     CODE STATUS  Full Code    HPI & HOSPITAL COURSE  This is a 49 y.o. female here with .cjhief  Please review Dr. Rodney Valdovinos, MARCELO. notes for further details of history of present illness, past medical/social/family histories, allergies and medications. Please review , Ortho consultation notes.  Lorene Haro is a 49 y.o. morbidly obese female from ? Former smoker with a history of Afib on Eliquis, hyperlipdemia presented 10/30/2024 to the ED but officially admitted 10/31/2024 as an ED request for admission for L ankle talus fracture needing surgery. She first presented rolling her ankle 3d ago and was at the ED. CT showed subacute to chronic vertically oriented fracture of the talus. Dr. Rosa, ortho recommended discharge from the ED on 10/30 and outpatient follow-up for surgery in 10 days. However due to the medical complexities and inability of the patient to manage on her own at home orthopedics were reconsulted on 10/31 and requested inpatient admission to the hospitalist for surgery.   At the ED afebrile, hemodynamically stable.   S/p ORIF L ankle for L talar body fracture by Dr. Rosa on 11/2. Recommends TTWB LLE, gait aids (crutch or crutches, cane, walker) may be used as needed, and may be discontinued when no longer required, follow-up Orthopedics in 1-2 weeks. Lovenox prophylaxis per Orthopedics until MONDAY 11/4/2024 can restart Eliquis TUESDAY 11/5/2024 per Orthopedics. Discharged to SNF. At discharge date, Lorene Haro afebrile and hemodynamically stable.  Lorene Haro wanted to be discharged today.    UPDATE: 11/4/2024 I got a call from outpatient  Pharmacy as I do not see any ELiquis on the med rec. I CLARIFIED that per Ortho patient will get Lovenox ppx for today and RESTART ELIQUIS TOMORROW 11/5/2024. DO NOT CANCEL ELIQUIS. Pharmacist aware.      Imaging  DX-PORTABLE FLUORO > 1 HOUR   Final Result      Intraoperative images intended for localization and not for diagnostic purposes demonstrate ORIF of the talus. See procedure report for details.      Fluoroscopy time: 0 minutes 30.7 seconds   Fluoroscopy dose: .4395 Gycm2   Fluoroscopy images: 3      DX-ANKLE 2- VIEWS LEFT   Final Result      Intraoperative images intended for localization and not for diagnostic purposes demonstrate ORIF of the talus. See procedure report for details.      Fluoroscopy time: 0 minutes 30.7 seconds   Fluoroscopy dose: .4395 Gycm2   Fluoroscopy images: 3      CT-FOOT W/O PLUS RECONS LEFT   Final Result         1. As noted on the prior conventional radiographs, there is a vertically oriented fracture of the talus. This appears subacute to chronic.   2. Osteopenia.   3. Osteoarthrosis.   4. Muscular atrophy.      DX-ANKLE 3+ VIEWS LEFT   Final Result      New fracture of the posterior talus.               No notes on file    Therefore, she is discharged in fair and stable condition to skilled nursing facility.    The patient met 2-midnight criteria for an inpatient stay at the time of discharge.      FOLLOW UP ITEMS POST DISCHARGE      DISCHARGE DIAGNOSES  Principal Problem:    Talus fracture (POA: Unknown)  Active Problems:    Morbid obesity (HCC) (POA: Yes)    AF (atrial fibrillation) (HCC) (POA: Yes)    Hyperlipidemia (POA: Yes)    Asthma-COPD overlap syndrome (HCC) (POA: Yes)    Closed left ankle fracture, initial encounter (POA: Yes)    BMI 50.0-59.9, adult (HCC) (POA: Unknown)        FOLLOW UP  Future Appointments   Date Time Provider Department Center   11/7/2024 10:30 AM [facility] None   11/20/2024  2:00 PM [facility] EXAM 4 [facility] None   12/2/2024  3:40  PM Oz Borrego M.D. [facility] None   12/6/2024  4:00 PM [facility] None     [facility], Emergency Dept  [address]  [place] [id number] [phone]  Go to       Roman Rosa M.D.  [address]  [place] [address] [phone]    Schedule an appointment as soon as possible for a visit in 1 week  For follow up and evaluation    Lili Reddy M.D.  [address]  [address]  [place] [address] [phone]        Follow up with Lili Reddy M.D. or attending at facility upon receipt  Follow up with Dr. Rosa in 1-2 weeks postop visit  Follow up with your pulmonoogist for O2 use and COPD in 1 week  Follow up with Cardiology in 1-2 weeks for Afib/anticoag  NURSING provide resources/pamphlets for  Postoperative instructions (TTWB LLE, gait aids (crutch or crutches, cane, walker) may be used as needed, and may be discontinued when no longer required, follow-up Orthopedics in 1-2 weeks. Lovenox prophylaxis per Orthopedics until MONDAY 11/4/2024 can restart Eliquis TUESDAY 11/5/2024 per Orthopedics), Afib/anticoag (Record heart rate, blood pressures for primary provider to review. Monitor for bleeding, GI bleeding and black tarry stools and alert your physician. Monitor renal function with your provider especially if you are on DOAC), COPD, oxygen use, obesity (Recommended weight loss advised, 5% through reduced calorie, low carb diet and 150 mins of exercise a week once better  Recommend bariatric surgery evaluation if morbidly obese  Educated on the increase of morbidity and mortality associated with excess weight including DM, Heart Disease, HTN, stroke, and sleep apnea. Recommended outpatient monitoring  of blood sugars, lipid panel, sleep study evaluation for metabolic syndrome.), narc use (Avoid swimming, driving or operating machinery. Treat constipation with prescribed bowel regimen    MEDICATIONS ON DISCHARGE     Medication List         START taking these medications        Instructions   calcium carbonate 500 MG Tabs  Commonly known as: Os-Yong 500   Take 1 Tablet by mouth 2 times a day with meals.  Dose: 500 mg     enoxaparin 40 MG/0.4ML Sosy inj  Commonly known as: Lovenox   Inject 40 mg under the skin every day at 6 PM for 2 days.  Dose: 40 mg     hydrOXYzine HCl 25 MG Tabs  Commonly known as: Atarax   Take 1 Tablet by mouth 3 times a day as needed for Itching.  Dose: 25 mg     oxyCODONE immediate-release 5 MG Tabs  Commonly known as: Roxicodone   Take 1 Tablet by mouth every 6 hours as needed for Severe Pain for up to 3 days.  Dose: 5 mg     polyethylene glycol/lytes Pack  Commonly known as: Miralax   Take 1 Packet by mouth 1 time a day as needed (if no bowel movement in last 2 days).  Dose: 17 g     senna-docusate 8.6-50 MG Tabs  Commonly known as: Pericolace Or Senokot S   Take 2 Tablets by mouth every evening.  Dose: 2 Tablet     Vitamin D (Cholecalciferol) 25 MCG (1000 UT) Tabs  Commonly known as: Cholecalciferol   Take 2 Tablets by mouth every day.  Dose: 2,000 Units            CHANGE how you take these medications        Instructions   apixaban 5mg Tabs  Start taking on: November 5, 2024  What changed: These instructions start on November 5, 2024. If you are unsure what to do until then, ask your doctor or other care provider.  Commonly known as: Eliquis   Doctor's comments: START TUESDAY 11/5/2024  Take 1 Tablet by mouth 2 times a day.  Dose: 5 mg            CONTINUE taking these medications        Instructions   acetaminophen 325 MG Tabs  Commonly known as: Tylenol   Take 650 mg by mouth every four hours as needed for Moderate Pain.  Dose: 650 mg     Advair Diskus 100-50 MCG/ACT Aepb  Generic drug: fluticasone-salmeterol   Doctor's comments: 3 month supply x 1 year  Inhale 1 Puff 2 times a day.  Dose: 1 Puff     aripiprazole 30 MG tablet  Commonly known as: Abilify   Take 1 Tablet by mouth every morning. Indications: Major  Depressive Disorder  Dose: 30 mg     ascorbic acid 500 MG tablet  Commonly known as: Vitamin C   Take 500 mg by mouth every day.  Dose: 500 mg     atorvastatin 20 MG Tabs  Commonly known as: Lipitor   Take 1 Tablet by mouth at bedtime.  Dose: 20 mg     cyclobenzaprine 10 mg Tabs  Commonly known as: Flexeril   Take 1 Tablet by mouth 3 times a day as needed for Moderate Pain or Muscle Spasms.  Dose: 10 mg     ferrous sulfate 325 (65 Fe) MG tablet   Take 325 mg by mouth every 48 hours.  Dose: 325 mg     fluticasone 50 MCG/ACT nasal spray  Commonly known as: Flonase   Administer 2 Sprays into each nostril every morning.  Dose: 2 Spray     gabapentin 300 MG Caps  Commonly known as: Neurontin   Take 1 Capsule by mouth 3 times a day.  Dose: 300 mg     Home Care Oxygen   Inhale 2 L/min every evening. DME = Preferred  Dose: 2 L/min     ketoconazole 2 % Crea  Commonly known as: Nizoral   Apply 1 Application topically 2 times a day.  Dose: 1 Application     lidocaine 5 % Ptch  Commonly known as: Lidoderm   Place 1 Patch on the skin every 12 hours.  Dose: 1 Patch     metoprolol SR 25 MG Tb24  Commonly known as: Toprol XL   Take 25 mg by mouth every day.  Dose: 25 mg     montelukast 10 MG Tabs  Commonly known as: Singulair   Doctor's comments: 3 month supply x 1 year  Take 1 Tablet by mouth every evening.  Dose: 10 mg     * nicotine 14 MG/24HR Pt24  Commonly known as: Nicoderm   Place 1 Patch on the skin every 24 hours.  Dose: 1 Patch     * nicotine 21 MG/24HR Pt24  Commonly known as: Nicoderm   Place 1 Patch on the skin every 24 hours.  Dose: 1 Patch     * nicotine 7 MG/24HR Pt24  Commonly known as: Nicoderm            * This list has 3 medication(s) that are the same as other medications prescribed for you. Read the directions carefully, and ask your doctor or other care provider to review them with you.                STOP taking these medications      doxycycline 100 MG Tabs  Commonly known as: Vibramycin     ibuprofen 200  MG Tabs  Commonly known as: Motrin     naproxen 500 MG Tabs  Commonly known as: Naprosyn              Allergies  Allergies   Allergen Reactions    Amoxicillin Rash and Itching     Tolerates cephalosporins, pt reports that she gets a rash all over her body and gets itchy    Penicillin G Rash and Itching     pt reports that she gets a rash all over her body and gets itchy       DIET  Orders Placed This Encounter   Procedures    Diet Order Diet: Cardiac     Standing Status:   Standing     Number of Occurrences:   1     Order Specific Question:   Diet:     Answer:   Cardiac [6]       ACTIVITY      LINES, DRAINS, AND WOUNDS  This is an automated list. Peripheral IVs will be removed prior to discharge.  Peripheral IV 10/31/24 20 G Right Antecubital (Active)   Site Assessment Dry 11/03/24 0900   Dressing Type Occlusive;Transparent 11/03/24 0900   Line Status Scrubbed the hub prior to access;Flushed;Saline locked 11/03/24 0900   Dressing Status Clean;Dry;Intact 11/03/24 0900   Dressing Intervention N/A 11/03/24 0900   Dressing Change Due 11/09/24 11/02/24 2000   Infiltration Grading ([facility], [facility]) 0 11/03/24 0900   Phlebitis Scale ([facility] Only) 0 11/03/24 0900       Wound 09/20/24 Skin Tear Pannus Lower Bilateral MOISTURE (Active)       Wound 09/20/24 Other (comment) Groin Bilateral REDNESS BILATERAL GROIN (Active)       Wound 11/02/24 Ankle Left (Active)   Site Assessment ARAM 11/02/24 2000   Periwound Assessment ARAM 11/02/24 2000   Closure Secondary intention 11/02/24 2000   Dressing Status Clean;Dry;Intact 11/02/24 2000   Dressing Changed Observed 11/02/24 2000       Wound 11/02/24 Incision Foot Left xeroform, 4x4, softroll, ace (Active)   Site Assessment ARAM 11/03/24 0730   Periwound Assessment ARAM 11/03/24 0730   Margins ARAM 11/03/24 0730   Closure ARAM 11/03/24 0730   Drainage Amount ARAM 11/03/24 0730   Dressing Status Clean;Dry;Intact 11/03/24 0730   Dressing Changed Observed 11/03/24 0730   Dressing Options Ace  Wrap;Dry Gauze;Soft Conforming Roll;Other (Comments) 11/02/24 1521       Peripheral IV 10/31/24 20 G Right Antecubital (Active)   Site Assessment Dry 11/03/24 0900   Dressing Type Occlusive;Transparent 11/03/24 0900   Line Status Scrubbed the hub prior to access;Flushed;Saline locked 11/03/24 0900   Dressing Status Clean;Dry;Intact 11/03/24 0900   Dressing Intervention N/A 11/03/24 0900   Dressing Change Due 11/09/24 11/02/24 2000   Infiltration Grading ([facility], [facility]) 0 11/03/24 0900   Phlebitis Scale ([facility] Only) 0 11/03/24 0900               MENTAL STATUS ON TRANSFER             CONSULTATIONS  Ortho    PROCEDURES  Please see Dr. Rosa's OP NOTE    LABORATORY  Lab Results   Component Value Date    SODIUM 140 11/02/2024    POTASSIUM 4.6 11/02/2024    CHLORIDE 103 11/02/2024    CO2 27 11/02/2024    GLUCOSE 103 (H) 11/02/2024    BUN 16 11/02/2024    CREATININE 0.78 11/02/2024    CREATININE 0.9 09/11/2008        Lab Results   Component Value Date    WBC 7.8 11/02/2024    HEMOGLOBIN 11.3 (L) 11/02/2024    HEMATOCRIT 37.3 11/02/2024    PLATELETCT 210 11/02/2024        Total time of the discharge process exceeds 40 minutes.

## 2024-11-03 NOTE — PROGRESS NOTES
Pt being discharged to [place]. IV dc'd. Discharge instructions discussed. All questions answered.  Patient agreeable to discharge plan. Patient has all belongings at time of dc. [facility] to transport.

## 2024-11-03 NOTE — DISCHARGE PLANNING
Case Management Discharge Planning    Admission Date: 10/30/2024  GMLOS: 2.8  ALOS: 3    6-Clicks ADL Score: 17  6-Clicks Mobility Score: 13  PT and/or OT Eval ordered: Yes  Post-acute Referrals Ordered: Yes  Post-acute Choice Obtained: Yes  Has referral(s) been sent to post-acute provider:  Yes      Anticipated Discharge Dispo: Discharge Disposition: D/T to SNF with Medicare cert in anticipation of skilled care (03)    DME Needed: No    Action(s) Taken: Pt was discussed in IDT rounds.  Per MD, possible medical clearance today. PT/OT recommending post acute placement. RNCM met with patient at the bedside to obtain choice for SNF.  #1. [facility] #2. [facility].  RNCM faxed choice form to [facility]. RNCM spoke with Shirin at [facility].  Per Shirin---no auth needed w/ medicaid FFS.  Shirin stated they have beds available and requested 1500 transport time.  RNCM updated MD regarding SNF acceptance and asked about medical clearance.      1208  Per MD, pt is medically clear.  Okay to scheduled transport.  RNCM sent order and PCS form to [facility] to schedule transport to [facility] at 1500.  Awaiting confirmation of transport/time.      1334  Received confirmation of transport time of 1500 via [facility] to [facility].  RNCM updated patient---patient agreeable.  RNCM called Shirin to update on transport time.  [facility]ra packet with face sheets x2, chart notes, DC summary and hard script given to bedside RN.  Phone number to call report given to bedside RN ([phone]).      Escalations Completed: None    Medically Clear: No    Next Steps: RN CM to continue to follow for DC planning      Barriers to Discharge: Medical clearance, Pending Placement, and Pending Insurance Authorization

## 2024-11-03 NOTE — DISCHARGE PLANNING
1320  GISELE spoke with [facility] who scheduled transportation via [facility] ETA 1500 conf#[id number]/Notified CM via Teams

## 2024-11-03 NOTE — PROGRESS NOTES
"      Orthopaedic Progress Note    Interval changes:  Patient doing well post op  LLE in cam boot, dressings are CDI  Cleared for DC to SNF by ortho pending medicine clearance    ROS - Patient denies any new issues.  Pain well controlled.    /53   Pulse 69   Temp 36.7 °C (98.1 °F) (Temporal)   Resp 17   Ht 1.549 m (5' 1\")   Wt 124 kg (273 lb)   SpO2 94%     Patient seen and examined  No acute distress  Breathing non labored  RRR  LLE in cam boot, dressings CDI, DNVI, moves all toes, cap refill <2 sec.     Recent Labs     11/02/24  0449   WBC 7.8   RBC 4.22   HEMOGLOBIN 11.3*   HEMATOCRIT 37.3   MCV 88.4   MCH 26.8*   MCHC 30.3*   RDW 51.4*   PLATELETCT 210   MPV 11.7       Active Hospital Problems    Diagnosis     AF (atrial fibrillation) ([facility]) [I48.91]      Priority: Medium    Morbid obesity ([facility]) [E66.01]      Priority: Low    BMI 50.0-59.9, adult ([facility]) [Z68.43]     Talus fracture [S92.109A]     Closed left ankle fracture, initial encounter [S82.892A]     Asthma-COPD overlap syndrome ([facility]) [J44.89]     Hyperlipidemia [E78.5]        Assessment/Plan:  Patient doing well post op  LLE in cam boot, dressings are CDI  Cleared for DC to SNF by ortho pending medicine clearance  POD#1 S/P Open treatment of left talar body fracture with internal fixation  Wt bearing status - TTWB LLE in [facility] boot  Wound care/Drains - Dressings to be changed every other day by nursing. Or PRN for saturation starting POD#2  Future Procedures - none planned   Lovenox: Start 11/3, Duration-until ambulatory > 150'  Sutures/Staples out- 14-21 days post operatively. Removal will completed by ortho mid levels only.  PT/OT-initiated  Antibiotics: Perioperative completed  DVT Prophylaxis- TEDS/SCDs/Foot pumps  Henao-not needed per ortho  Case Coordination for Discharge Planning - Disposition per therapy recs.    "

## 2024-11-03 NOTE — CARE PLAN
The patient is Stable - Low risk of patient condition declining or worsening    Shift Goals  Clinical Goals: Pt will have pain managed at 3/10 or less throughout the end of my shift  Patient Goals: Pain management  Family Goals: ARAM    Progress made toward(s) clinical / shift goals:    Problem: Pain - Standard  Goal: Alleviation of pain or a reduction in pain to the patient’s comfort goal  Outcome: Progressing  Note: Pt calls for pain management constantly and was educated on non-pharm methods of pain management      Problem: Safety  Goal: Will remain free from injury  Outcome: Progressing  Note: Pt is able to maintain a safe environment w/ assistance from RN and CNA     Problem: Fall Risk  Goal: Patient will remain free from falls  Outcome: Progressing  Note: All proper fall precautions in place, pt scores as moderate fall risk        Patient is not progressing towards the following goals:

## 2024-11-04 PROCEDURE — 93298 REM INTERROG DEV EVAL SCRMS: CPT | Mod: 26 | Performed by: STUDENT IN AN ORGANIZED HEALTH CARE EDUCATION/TRAINING PROGRAM

## 2024-11-04 NOTE — CARDIAC REMOTE MONITOR - SCAN
Device transmission reviewed. Device demonstrated appropriate function.       Electronically Signed by: Donny Fishman MD, PhD    11/4/2024  5:14 PM

## 2024-11-06 NOTE — PROGRESS NOTES
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 Last seen in the office on 5/30/2024 with myself.     Patient presents for titration results.     Sleep more restful with CPAP usage    Denies morning headaches.    Denies daytime drowsiness / driving drowsy.     Denies any issues falling asleep.      Snoring / Gasping / Apneas    Palpitations    Dry mouth    Aerophagia    Mask leak / Skin irritation    Goes to bed:  Wakes up:  Naps (frequency and duration):  Awakenings:  Issues falling back to sleep?        DME provider: ***  Device: ***  Settings:***  Oxygen: ***  When:***  Mask: ***  Chin strap: {YES/NO:20266}    Cleaning regimen: ***    Compliance:  Compliance data reviewed showing ***% usage > 4hours in last 30 *** days. Average AHI *** events/hour. 95% pressure *** CWP. 95% leaks *** L/min. Patient continues to use and benefit from machine. ***     Sleep History:  PSG titration 6/26/2024-        Patient Active Problem List    Diagnosis Date Noted    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    Asthma-COPD overlap syndrome (HCC) 10/23/2022    Pulmonary nodules 05/30/2024    Gastroesophageal reflux disease without esophagitis 11/06/2022    Acute respiratory failure with hypoxia (HCC) 10/06/2021    Lower extremity edema 04/01/2021    Tobacco abuse, in remission 01/06/2021    KATHIA (obstructive sleep apnea) 11/15/2018    Chronic respiratory failure with hypoxia (HCC) 03/25/2024    Dependence on nocturnal oxygen therapy 01/26/2023    Class 3  severe obesity in adult (HCC) 10/24/2022    Nail overgrowth 08/04/2021    Morbid obesity (HCC) 11/18/2019    BMI 50.0-59.9, adult ([facility]) 11/02/2024    Talus fracture 10/31/2024    Closed left ankle fracture, initial encounter 10/31/2024    Cirrhosis ([facility]) 09/21/2024    COPD exacerbation ([facility]) 09/20/2024    Acute on chronic systolic heart failure ([facility]) 09/20/2024    Vertigo 08/19/2024    Tobacco abuse 08/10/2024    ACS (acute coronary syndrome) ([facility]) 08/10/2024    Chest pain, likely musculoskeletal 07/30/2024    Advance care planning 07/30/2024    Acute hypoxemic respiratory failure ([facility]) 05/20/2024    Blood in stool 05/20/2024    Intractable back pain 03/25/2024    Ligamentum flavum hypertrophy 11/04/2023    Protrusion of lumbar intervertebral disc 11/04/2023    Acute chest pain 10/12/2023    Cellulitis 08/09/2023    Postural dizziness with presyncope 08/01/2023    Oropharyngeal dysphagia 11/08/2022    Stasis dermatitis of both legs 11/07/2022    Shortness of breath + Somnolence 11/06/2022    Insomnia 11/06/2022    Hyperglycemia 11/06/2022    Acute respiratory failure with hypoxia and hypercapnia ([facility]) 10/24/2022    Status post placement of implantable loop recorder 10/23/2022    Atypical pneumonia 10/05/2022    Elevated alkaline phosphatase level 10/01/2022    Restrictive lung disease secondary to obesity 10/01/2022    ?Asthma 09/02/2022    Sinus pause 09/02/2022    Elevated LFTs 09/02/2022    Prediabetes 08/29/2022    Acute respiratory failure due to COVID-19 ([facility]) 08/26/2022    Chest pain, rule out acute myocardial infarction 02/26/2022    Secondary hypercoagulable state ([facility]) 11/24/2021    Pneumonia due to COVID-19 virus 10/07/2021    Pneumonia 10/06/2021    Severe persistent asthma without complication 07/26/2021    Obesity hypoventilation syndrome (HCC) 07/23/2021    Hyperlipidemia 06/17/2021    Yeast infection of the skin 04/01/2021    Anxiety 04/01/2021    Blood glucose elevated 03/21/2021    Elevated brain  natriuretic peptide (BNP) level 2021    AF (atrial fibrillation) ([facility]) 2021    Pain in the chest 2021    Schizophrenia ([facility]) 2021    Macrocytic anemia 2019    Acute bronchitis 2019    Leukocytosis 2019    Iron deficiency anemia 2018    Reactive airway disease with acute exacerbation 2018    ?possible Schizophrenia vs Bipolar? 2018    Cardiomegaly 2018     Past Medical History:   Diagnosis Date    A-fib ([facility])     MONI (acute kidney injury) ([facility]) 2023    Asthma     Bipolar 2 disorder ([facility])     Chickenpox     Chronic obstructive pulmonary disease ([facility])     Congestive heart failure ([facility])     Hypertension     On home oxygen therapy     Other psychological stress     Schizophrenic disorder ([facility])     Yeast infection of the skin 2021      Past Surgical History:   Procedure Laterality Date    ORIF, ANKLE Left 2024    Procedure: ORIF, ANKLE- TALUS;  Surgeon: Roman Rosa M.D.;  Location: SURGERY [facility];  Service: Orthopedics    CHOLECYSTECTOMY      COLECTOMY      HYSTERECTOMY LAPAROSCOPY      KNEE ARTHROSCOPY Left      Family History   Problem Relation Age of Onset    No Known Problems Mother     Cancer Sister      Social History     Socioeconomic History    Marital status: Single     Spouse name: Not on file    Number of children: Not on file    Years of education: Not on file    Highest education level: Not on file   Occupational History    Not on file   Tobacco Use    Smoking status: Former     Current packs/day: 0.00     Types: Cigarettes     Quit date: 10/12/2022     Years since quittin.0    Smokeless tobacco: Never   Vaping Use    Vaping status: Never Used   Substance and Sexual Activity    Alcohol use: Not Currently    Drug use: Never    Sexual activity: Not Currently   Other Topics Concern    Not on file   Social History Narrative    ** Merged History Encounter **         From [place]     [employer] [facility] [employer]      Financial Resource Strain: Medium Risk (5/4/2022)    Overall Financial Resource Strain (CARDIA)     Difficulty of Paying Living Expenses: Somewhat hard   Food Insecurity: No Food Insecurity (10/31/2024)    Hunger Vital Sign     Worried About Running Out of Food in the Last Year: Never true     Ran Out of Food in the Last Year: Never true   Transportation Needs: No Transportation Needs (10/31/2024)    PRAPARE - Transportation     Lack of Transportation (Medical): No     Lack of Transportation (Non-Medical): No   Physical Activity: Not on file   Stress: Not on file   Social Connections: Not on file   Intimate Partner Violence: Not At Risk (10/31/2024)    Humiliation, Afraid, Rape, and Kick questionnaire     Fear of Current or Ex-Partner: No     Emotionally Abused: No     Physically Abused: No     Sexually Abused: No   Housing Stability: Low Risk  (10/31/2024)    Housing Stability Vital Sign     Unable to Pay for Housing in the Last Year: No     Number of Times Moved in the Last Year: 1     Homeless in the Last Year: No     Current Outpatient Medications   Medication Sig Dispense Refill    calcium carbonate (OS-ABELARDO 500) 500 MG Tab Take 1 Tablet by mouth 2 times a day with meals.      cyclobenzaprine (FLEXERIL) 10 mg Tab Take 1 Tablet by mouth 3 times a day as needed for Moderate Pain or Muscle Spasms. 15 Tablet 1    gabapentin (NEURONTIN) 300 MG Cap Take 1 Capsule by mouth 3 times a day.      hydrOXYzine HCl (ATARAX) 25 MG Tab Take 1 Tablet by mouth 3 times a day as needed for Itching.      senna-docusate (PERICOLACE OR SENOKOT S) 8.6-50 MG Tab Take 2 Tablets by mouth every evening.      polyethylene glycol/lytes (MIRALAX) Pack Take 1 Packet by mouth 1 time a day as needed (if no bowel movement in last 2 days).      Vitamin D, Cholecalciferol, (CHOLECALCIFEROL) 25 MCG (1000 UT) Tab Take 2 Tablets by mouth every day. 60 Tablet 0    enoxaparin (LOVENOX) 40 MG/0.4ML Solution Prefilled Syringe inj Inject 40 mg under  the skin every day at 6 PM for 2 days. 2 Each 0    apixaban (ELIQUIS) 5mg Tab Take 1 Tablet by mouth 2 times a day.      oxyCODONE immediate-release (ROXICODONE) 5 MG Tab Take 1 Tablet by mouth every 6 hours as needed for Severe Pain for up to 3 days. 12 Tablet 0    nicotine (NICODERM) 21 MG/24HR PATCH 24 HR Place 1 Patch on the skin every 24 hours.      ascorbic acid (VITAMIN C) 500 MG tablet Take 500 mg by mouth every day.      ferrous sulfate 325 (65 Fe) MG tablet Take 325 mg by mouth every 48 hours.      ketoconazole (NIZORAL) 2 % Cream Apply 1 Application topically 2 times a day.      acetaminophen (TYLENOL) 325 MG Tab Take 650 mg by mouth every four hours as needed for Moderate Pain.      nicotine (NICODERM) 7 MG/24HR PATCH 24 HR       lidocaine (LIDODERM) 5 % Patch Place 1 Patch on the skin every 12 hours.      nicotine (NICODERM) 14 MG/24HR PATCH 24 HR Place 1 Patch on the skin every 24 hours.      Home Care Oxygen Inhale 2 L/min every evening. DME = Preferred      ADVAIR DISKUS 100-50 MCG/ACT AEROSOL POWDER, BREATH ACTIVATED Inhale 1 Puff 2 times a day. 180 Each 3    montelukast (SINGULAIR) 10 MG Tab Take 1 Tablet by mouth every evening. 90 Tablet 3    metoprolol SR (TOPROL XL) 25 MG TABLET SR 24 HR Take 25 mg by mouth every day.      fluticasone (FLONASE) 50 MCG/ACT nasal spray Administer 2 Sprays into each nostril every morning.      aripiprazole (ABILIFY) 30 MG tablet Take 1 Tablet by mouth every morning. Indications: Major Depressive Disorder 30 Tablet 0    atorvastatin (LIPITOR) 20 MG Tab Take 1 Tablet by mouth at bedtime. 30 Tablet 0     No current facility-administered medications for this visit.      ALLERGIES: Amoxicillin and Penicillin g    ROS:  Constitutional: Denies fever, chills, sweats,  weight loss, fatigue  Cardiovascular: Denies chest pain, tightness, palpitations, swelling in legs/feet  Respiratory: Denies shortness of breath, cough, sputum, wheezing, painful breathing   Sleep: per  HPI  Gastrointestinal: Denies  difficulty swallowing, nausea, abdominal pain, diarrhea, constipation, heartburn.  Musculoskeletal: Denies painful joints, sore muscles,       PHYSICAL EXAM:  LMP  (LMP Unknown)   Appearance: Well-nourished, well-developed, no acute distress  Eyes:  No scleral icterus , EOMI  ENMT: No redness of the oropharynx***  Lung auscultation:  No wheezes rhonchi rubs or rales***  Cardiac: No murmurs, rubs, or gallops; regular rhythm, normal rate; no edema***  Musculoskeletal:  Grossly normal; gait and station normal; digits and nails normal  Skin:  No rashes, petechiae, cyanosis  Neurologic: without focal signs; oriented to person, time, place, and purpose; judgement intact  Psychiatric:  No depression, anxiety, agitation  Mallampati score: Class ***    Assessment and Plan:    The medical record was reviewed.    Diagnostic and titration nocturnal polysomnogram's, home sleep apnea tests, continuous nocturnal oximetry results, multiple sleep latency tests, and compliance reports reviewed.    Problem List Items Addressed This Visit    None    Have advised the patient to follow up with the appropriate healthcare practitioners for all other medical problems and issues.    No follow-ups on file.    Please note portions of this record was created using voice recognition software. I have made every reasonable attempt to correct obvious errors, but I expect that there are errors of grammar and possibly content I did not discover before finalizing the note.    Time spent in record review prior to patient arrival, reviewing results, and in face-to-face encounter totaled *** min.  __________  HENRIETTA Wu  Pulmonary & Sleep Medicine  [facility]

## 2024-11-07 ENCOUNTER — OFFICE VISIT (OUTPATIENT)
Dept: SLEEP MEDICINE | Facility: MEDICAL CENTER | Age: 49
End: 2024-11-07
Payer: MEDICAID

## 2024-11-07 ENCOUNTER — APPOINTMENT (OUTPATIENT)
Dept: SLEEP MEDICINE | Facility: MEDICAL CENTER | Age: 49
End: 2024-11-07
Payer: MEDICAID

## 2024-11-07 VITALS
WEIGHT: 270 LBS | BODY MASS INDEX: 50.98 KG/M2 | OXYGEN SATURATION: 95 % | HEIGHT: 61 IN | DIASTOLIC BLOOD PRESSURE: 60 MMHG | HEART RATE: 61 BPM | SYSTOLIC BLOOD PRESSURE: 110 MMHG

## 2024-11-07 DIAGNOSIS — G47.33 OSA (OBSTRUCTIVE SLEEP APNEA): ICD-10-CM

## 2024-11-07 DIAGNOSIS — Z23 NEED FOR VACCINATION: ICD-10-CM

## 2024-11-07 PROCEDURE — 99213 OFFICE O/P EST LOW 20 MIN: CPT

## 2024-11-07 PROCEDURE — 90471 IMMUNIZATION ADMIN: CPT

## 2024-11-07 PROCEDURE — 3074F SYST BP LT 130 MM HG: CPT

## 2024-11-07 PROCEDURE — 3078F DIAST BP <80 MM HG: CPT

## 2024-11-07 ASSESSMENT — FIBROSIS 4 INDEX: FIB4 SCORE: 0.88

## 2024-11-07 NOTE — PATIENT INSTRUCTIONS
Please make sure that you are seen within 90 days of receiving your machine, with at least 60 days of use.  Please call scheduling at [phone] if your appointment needs to be moved to meet these parameters.     To meet compliance requirements for insurance please ensure that you use the machine at least 6/7 days a week for at least 4 or more hours a night.    I advise patients to research different mask options before picking up the new machine.  You should also ask what the [employer] company's return policy is for the mask because if you do not like the mask and don't return it within that time frame, you will have to wait 6 months for insurance to cover another mask.     Please bring your entire machine and chip to your first appointment, regardless if the machine is set up for wireless access.    Please do not return the machine without discussing any issues with a sleep provider, as you would be forfeiting therapy and would have to restart the testing process over.

## 2024-11-07 NOTE — ASSESSMENT & PLAN NOTE
Sleep Apnea:    The pathophysiology of sleep anea and the increased risk of cardiovascular morbidity from untreated sleep apnea is discussed in detail with the patient.  Urged to avoid supine sleep, weight gain and alcoholic beverages since all of these can worsen sleep apnea. Cautioned against drowsy driving. If feeling sleepy while driving, pull over for a break/nap, rather than persist on the road, in the interest of own safety and that of others on the road.  The risks of untreated sleep apnea were discussed with the patient at length. Patients with sleep apnea are at increased risk of cardiovascular disease including coronary artery disease, systemic arterial hypertension, pulmonary arterial hypertension, cardiac arrythmias, and stroke.  Positive airway pressure will favorably impact many of the adverse conditions and effects provoked by sleep apnea.    Plan:    Titration results was reviewed and discussed with the patient.  We spent significant time today to find sleep apnea, risk of untreated sleep apnea, treatment option with iVAPS, process of obtaining the machine, and follow-up parameters.    - Order placed for iVAPS  Target [place]: 5.6, Tgt [place]: 18, EPAP: 16, PS: 4/14, Max pressure 30 cmH2O, Height: 155cm , Rise Time: 300, Ti min: 0.3, Ti max: 2.0, Trigger: High, and Cycle: medium.  Sent to [facility].   - Compliance was reinforced  - Clean supplies a least once a week with dish soap and water and air dry  - Recommended the patient against the use of Ozone , such as SoClean  - Recommended the patient change out supplies as recommended for best mask fit and usage of the machine  - Equipment replacement schedule:  Mask cushion every month  Nasal pillows 2 times per month  Mask every 6 months  Head gear every 6 months  Tubing every 3 months  Ultra-fine filters 2 times per month  Foam filter every 6 months  Humidifier chamber every 6 months  Chin strap every 6 months    Has been advised to start iVAPS, clean  equipment frequently, and get new mask and supplies as allowed by insurance and DME. Recommend an earlier appointment, if significant treatment barriers develop.    Advised patient to reach out via [email or web address] if any questions or concerns should arise.

## 2024-11-07 NOTE — PROGRESS NOTES
[facility] Follow-up Visit    Date of Visit: 11/7/2024     CC:  Follow-up for KATHIA management      HPI:  Lorene Haro is a very pleasant 49 y.o. year old female current  smoker (20+ pack-years, quit in 5/2024), with a PMHx of chronic back pain with sciatica, COVID-19 pneumonia in October 2021, atrial fibrillation, anxiety, secondary hypercoagulable state, lower extremity edema, bipolar/schizophrenia, obesity hypoventilation syndrome who presented to the [facility] for a regular follow up. Last seen in the office on 5/30/2024 with myself.     Patient presents for sleep study results.  Patient states she has daytime drowsiness, palpitations, and dry mouth.  Patient denies any significant morning headaches, issues falling asleep, snoring, gasping, or apneas.  Patient reports that she sleeps 7-8 hours a night and will have multiple awakenings but will not have any issues when back to sleep.  She does not nap.      Sleep History:  PSG 12/6/2018-        PSG titration 6/26/2024-          Patient Active Problem List    Diagnosis Date Noted    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    Asthma-COPD overlap syndrome (HCC) 10/23/2022    KATHIA (obstructive sleep apnea) 11/15/2018    Pulmonary nodules 05/30/2024    Gastroesophageal reflux disease without esophagitis 11/06/2022    Acute respiratory failure with hypoxia (HCC) 10/06/2021    Lower extremity edema 04/01/2021    Tobacco abuse, in remission 01/06/2021    Chronic respiratory failure with hypoxia ([facility]) 03/25/2024    Dependence on nocturnal oxygen therapy 01/26/2023    Class 3 severe obesity in adult (HCC) 10/24/2022    Nail overgrowth 08/04/2021    Morbid obesity (HCC) 11/18/2019    BMI 50.0-59.9, adult (Formerly [facility]) 11/02/2024    Talus fracture 10/31/2024    Closed left ankle fracture, initial encounter 10/31/2024    Cirrhosis (HCC) 09/21/2024    COPD exacerbation (HCC) 09/20/2024    Acute on chronic systolic heart failure (HCC)  09/20/2024    Vertigo 08/19/2024    Tobacco abuse 08/10/2024    ACS (acute coronary syndrome) ([facility]) 08/10/2024    Chest pain, likely musculoskeletal 07/30/2024    Advance care planning 07/30/2024    Acute hypoxemic respiratory failure (HCC) 05/20/2024    Blood in stool 05/20/2024    Intractable back pain 03/25/2024    Ligamentum flavum hypertrophy 11/04/2023    Protrusion of lumbar intervertebral disc 11/04/2023    Acute chest pain 10/12/2023    Cellulitis 08/09/2023    Postural dizziness with presyncope 08/01/2023    Oropharyngeal dysphagia 11/08/2022    Stasis dermatitis of both legs 11/07/2022    Shortness of breath + Somnolence 11/06/2022    Insomnia 11/06/2022    Hyperglycemia 11/06/2022    Acute respiratory failure with hypoxia and hypercapnia ([facility]) 10/24/2022    Status post placement of implantable loop recorder 10/23/2022    Atypical pneumonia 10/05/2022    Elevated alkaline phosphatase level 10/01/2022    Restrictive lung disease secondary to obesity 10/01/2022    ?Asthma 09/02/2022    Sinus pause 09/02/2022    Elevated LFTs 09/02/2022    Prediabetes 08/29/2022    Acute respiratory failure due to COVID-19 ([facility]) 08/26/2022    Chest pain, rule out acute myocardial infarction 02/26/2022    Secondary hypercoagulable state ([facility]) 11/24/2021    Pneumonia due to COVID-19 virus 10/07/2021    Pneumonia 10/06/2021    Severe persistent asthma without complication 07/26/2021    Obesity hypoventilation syndrome (HCC) 07/23/2021    Hyperlipidemia 06/17/2021    Yeast infection of the skin 04/01/2021    Anxiety 04/01/2021    Blood glucose elevated 03/21/2021    Elevated brain natriuretic peptide (BNP) level 03/19/2021    AF (atrial fibrillation) ([facility]) 03/19/2021    Pain in the chest 01/18/2021    Schizophrenia ([facility]) 01/18/2021    Macrocytic anemia 11/18/2019    Acute bronchitis 11/17/2019    Leukocytosis 11/17/2019    Iron deficiency anemia 11/18/2018    Reactive airway disease with acute exacerbation 11/13/2018     ?possible Schizophrenia vs Bipolar? 2018    Cardiomegaly 2018     Past Medical History:   Diagnosis Date    A-fib (HCC)     MONI (acute kidney injury) (HCC) 2023    Asthma     Bipolar 2 disorder (HCC)     Chickenpox     Chronic obstructive pulmonary disease (HCC)     Congestive heart failure (HCC)     Hypertension     On home oxygen therapy     Other psychological stress     Schizophrenic disorder (HCC)     Yeast infection of the skin 2021      Past Surgical History:   Procedure Laterality Date    ORIF, ANKLE Left 2024    Procedure: ORIF, ANKLE- TALUS;  Surgeon: Roman Rosa M.D.;  Location: SURGERY [facility];  Service: Orthopedics    CHOLECYSTECTOMY      COLECTOMY      HYSTERECTOMY LAPAROSCOPY      KNEE ARTHROSCOPY Left      Family History   Problem Relation Age of Onset    No Known Problems Mother     Cancer Sister      Social History     Socioeconomic History    Marital status: Single     Spouse name: Not on file    Number of children: Not on file    Years of education: Not on file    Highest education level: Not on file   Occupational History    Not on file   Tobacco Use    Smoking status: Former     Current packs/day: 0.00     Types: Cigarettes     Quit date: 10/12/2022     Years since quittin.0    Smokeless tobacco: Never   Vaping Use    Vaping status: Never Used   Substance and Sexual Activity    Alcohol use: Not Currently    Drug use: Never    Sexual activity: Not Currently   Other Topics Concern    Not on file   Social History Narrative    ** Merged History Encounter **         From [place]     Social Drivers of Health     Financial Resource Strain: Medium Risk (2022)    Overall Financial Resource Strain (CARDIA)     Difficulty of Paying Living Expenses: Somewhat hard   Food Insecurity: No Food Insecurity (10/31/2024)    Hunger Vital Sign     Worried About Running Out of Food in the Last Year: Never true     Ran Out of Food in the Last Year: Never true    Transportation Needs: No Transportation Needs (10/31/2024)    PRAPARE - Transportation     Lack of Transportation (Medical): No     Lack of Transportation (Non-Medical): No   Physical Activity: Not on file   Stress: Not on file   Social Connections: Not on file   Intimate Partner Violence: Not At Risk (10/31/2024)    Humiliation, Afraid, Rape, and Kick questionnaire     Fear of Current or Ex-Partner: No     Emotionally Abused: No     Physically Abused: No     Sexually Abused: No   Housing Stability: Low Risk  (10/31/2024)    Housing Stability Vital Sign     Unable to Pay for Housing in the Last Year: No     Number of Times Moved in the Last Year: 1     Homeless in the Last Year: No     Current Outpatient Medications   Medication Sig Dispense Refill    calcium carbonate (OS-ABELARDO 500) 500 MG Tab Take 1 Tablet by mouth 2 times a day with meals.      cyclobenzaprine (FLEXERIL) 10 mg Tab Take 1 Tablet by mouth 3 times a day as needed for Moderate Pain or Muscle Spasms. 15 Tablet 1    gabapentin (NEURONTIN) 300 MG Cap Take 1 Capsule by mouth 3 times a day.      hydrOXYzine HCl (ATARAX) 25 MG Tab Take 1 Tablet by mouth 3 times a day as needed for Itching.      senna-docusate (PERICOLACE OR SENOKOT S) 8.6-50 MG Tab Take 2 Tablets by mouth every evening.      polyethylene glycol/lytes (MIRALAX) Pack Take 1 Packet by mouth 1 time a day as needed (if no bowel movement in last 2 days).      Vitamin D, Cholecalciferol, (CHOLECALCIFEROL) 25 MCG (1000 UT) Tab Take 2 Tablets by mouth every day. 60 Tablet 0    apixaban (ELIQUIS) 5mg Tab Take 1 Tablet by mouth 2 times a day.      nicotine (NICODERM) 21 MG/24HR PATCH 24 HR Place 1 Patch on the skin every 24 hours.      ascorbic acid (VITAMIN C) 500 MG tablet Take 500 mg by mouth every day.      ferrous sulfate 325 (65 Fe) MG tablet Take 325 mg by mouth every 48 hours.      ketoconazole (NIZORAL) 2 % Cream Apply 1 Application topically 2 times a day.      acetaminophen (TYLENOL) 325  "MG Tab Take 650 mg by mouth every four hours as needed for Moderate Pain.      nicotine (NICODERM) 7 MG/24HR PATCH 24 HR       lidocaine (LIDODERM) 5 % Patch Place 1 Patch on the skin every 12 hours.      nicotine (NICODERM) 14 MG/24HR PATCH 24 HR Place 1 Patch on the skin every 24 hours.      Home Care Oxygen Inhale 2 L/min every evening. DME = Preferred      ADVAIR DISKUS 100-50 MCG/ACT AEROSOL POWDER, BREATH ACTIVATED Inhale 1 Puff 2 times a day. 180 Each 3    montelukast (SINGULAIR) 10 MG Tab Take 1 Tablet by mouth every evening. 90 Tablet 3    metoprolol SR (TOPROL XL) 25 MG TABLET SR 24 HR Take 25 mg by mouth every day.      fluticasone (FLONASE) 50 MCG/ACT nasal spray Administer 2 Sprays into each nostril every morning.      aripiprazole (ABILIFY) 30 MG tablet Take 1 Tablet by mouth every morning. Indications: Major Depressive Disorder 30 Tablet 0    atorvastatin (LIPITOR) 20 MG Tab Take 1 Tablet by mouth at bedtime. 30 Tablet 0     No current facility-administered medications for this visit.      ALLERGIES: Amoxicillin and Penicillin g    ROS:  Constitutional: Denies fever, chills, sweats,  weight loss, fatigue  Cardiovascular: Denies chest pain, tightness, palpitations, swelling in legs/feet  Respiratory: Denies shortness of breath, cough, sputum, wheezing, painful breathing   Sleep: per HPI  Gastrointestinal: Denies  difficulty swallowing, nausea, abdominal pain, diarrhea, constipation, heartburn.  Musculoskeletal: Denies painful joints, sore muscles,       PHYSICAL EXAM:  /60 (BP Location: Right arm, Patient Position: Sitting, BP Cuff Size: Large adult)   Pulse 61   Ht 1.549 m (5' 1\")   Wt 122 kg (270 lb)   LMP  (LMP Unknown)   SpO2 95% Comment: 2L  BMI 51.02 kg/m²   Appearance: Well-nourished, well-developed, no acute distress  Eyes:  No scleral icterus , EOMI  ENMT: No redness of the oropharynx  Lung auscultation:  No wheezes rhonchi rubs or rales  Cardiac: No murmurs, rubs, or gallops; " regular rhythm, normal rate; no edema  Musculoskeletal:  Grossly normal; gait and station normal; digits and nails normal  Skin:  No rashes, petechiae, cyanosis  Neurologic: without focal signs; oriented to person, time, place, and purpose; judgement intact  Psychiatric:  No depression, anxiety, agitation  Mallampati score: Class IV    Assessment and Plan:    The medical record was reviewed.    Diagnostic and titration nocturnal polysomnogram's, home sleep apnea tests, continuous nocturnal oximetry results, multiple sleep latency tests, and compliance reports reviewed.    Problem List Items Addressed This Visit          Pulmonary/Sleep Medicine Problems    KATHIA (obstructive sleep apnea)     Sleep Apnea:    The pathophysiology of sleep anea and the increased risk of cardiovascular morbidity from untreated sleep apnea is discussed in detail with the patient.  Urged to avoid supine sleep, weight gain and alcoholic beverages since all of these can worsen sleep apnea. Cautioned against drowsy driving. If feeling sleepy while driving, pull over for a break/nap, rather than persist on the road, in the interest of own safety and that of others on the road.  The risks of untreated sleep apnea were discussed with the patient at length. Patients with sleep apnea are at increased risk of cardiovascular disease including coronary artery disease, systemic arterial hypertension, pulmonary arterial hypertension, cardiac arrythmias, and stroke.  Positive airway pressure will favorably impact many of the adverse conditions and effects provoked by sleep apnea.    Plan:    Titration results was reviewed and discussed with the patient.  We spent significant time today to find sleep apnea, risk of untreated sleep apnea, treatment option with iVAPS, process of obtaining the machine, and follow-up parameters.    - Order placed for iVAPS  Target VA: 5.6, Tgt NC: 18, EPAP: 16, PS: 4/14, Max pressure 30 cmH2O, Height: 155cm , Rise Time: 300,  Ti min: 0.3, Ti max: 2.0, Trigger: High, and Cycle: medium.  Sent to [facility].   - Compliance was reinforced  - Clean supplies a least once a week with dish soap and water and air dry  - Recommended the patient against the use of Ozone , such as SoClean  - Recommended the patient change out supplies as recommended for best mask fit and usage of the machine  - Equipment replacement schedule:  Mask cushion every month  Nasal pillows 2 times per month  Mask every 6 months  Head gear every 6 months  Tubing every 3 months  Ultra-fine filters 2 times per month  Foam filter every 6 months  Humidifier chamber every 6 months  Chin strap every 6 months    Has been advised to start iVAPS, clean equipment frequently, and get new mask and supplies as allowed by insurance and DME. Recommend an earlier appointment, if significant treatment barriers develop.    Advised patient to reach out via [employer] if any questions or concerns should arise.          Relevant Orders    Referral to Pulmonary and Sleep Medicine    DME Other     Other Visit Diagnoses       Need for vaccination        Relevant Orders    Pneumococcal Conjugate Vaccine 20-Valent (6 wks+)          Have advised the patient to follow up with the appropriate healthcare practitioners for all other medical problems and issues.    Return in about 4 months (around 3/7/2025), or if symptoms worsen or fail to improve, for 1st compliance w/ 60 day compliance, with anyone .    Please note portions of this record was created using voice recognition software. I have made every reasonable attempt to correct obvious errors, but I expect that there are errors of grammar and possibly content I did not discover before finalizing the note.    Time spent in record review prior to patient arrival, reviewing results, and in face-to-face encounter totaled 20 min.  __________  HENRIETTA Wu  Pulmonary & Sleep Medicine  [facility]

## 2024-11-20 ENCOUNTER — ANTICOAGULATION VISIT (OUTPATIENT)
Dept: VASCULAR LAB | Facility: MEDICAL CENTER | Age: 49
End: 2024-11-20
Attending: INTERNAL MEDICINE
Payer: MEDICAID

## 2024-11-20 DIAGNOSIS — I48.0 PAROXYSMAL ATRIAL FIBRILLATION (HCC): ICD-10-CM

## 2024-11-20 PROCEDURE — 99212 OFFICE O/P EST SF 10 MIN: CPT

## 2024-11-20 NOTE — PROGRESS NOTES
Target end date: Indefinite  Indication: Afib  Drug: Eliquis 5 mg BID  CHADsVASC = 2  HAS-BLED = 0    Health Status Since Last Assessment  Pt had multiple hospitalizations for SOB (COPD exacerbation), GLF (broken ankle).   Pt denies any embolic events (stroke/tia/systemic embolism)    Adherence with DOAC Therapy  Pt has not missed doses in the average week.  DOAC is affordable    Bleeding Risk Assessment  Pt reports epistaxis when she blows her nose, but denies any epistaxis which is beyond just blowing her nose. Patient is receiving supplemental O2 which is likely drying out sinuses. Advised patient to utilize a saline rinse and/or vaseline to help keep skin/nares hydrated.   Pt denies any hematuria  Pt denies any excessive or unusual bleeding/hematomas.  Pt denies any GI bleeds or hematemesis.  Pt denies any concerning daily headache or subdural hematoma symptoms.    Latest Hemoglobin:  Low, stable (chronically low)  Hemoglobin   Date Value Ref Range Status   11/02/2024 11.3 (L) 12.0 - 16.0 g/dL Final     Latest Platelets: WNL  Platelet Count   Date Value Ref Range Status   11/02/2024 210 164 - 446 K/uL Final          Creatinine Clearance/Renal Function  Latest CrCl: >100 ml/min  Eliquis for AF: Scr NOT> 1.5, age NOT> 80, wt NOT < 60kg - renal dose adjustment NOT indicated    Hepatic function  Latest LFTs: WNL - alk phos chronically elevated.   Pt denies any history of liver dysfunction   Pt denies  ETOH overuse     Drug Interactions  ASA/other antiplatelets: None  NSAID: None  Other drug interactions: None  Verified no anticonvulsant or azole therapy, education provided for future use.     Examination  Blood Pressure WNL  There were no vitals filed for this visit.  Symptomatic hypotension: Denies   Significant gait impairment/imbalance/high risk for falls? Patient typically utilizes a walker, today she is in a wheelchair due to broken ankle.     Final Assessment and Recommendations:  Patient appears stable from  the anticoagulation standpoint.    Benefits of continued DOAC therapy outweigh risks for this patient  Recommend pt continue with current DOAC therapy: Eliquis 5 mg BID       Other Actions: CMP/CBC hemogram ordered prior to next visit    Patient provided paperwork from SNF to be signed at the end of the visit with instructions. Recommended continuing Eliquis 5 mg BID and that a new prescription was sent to pt's pharmacy of choice. Provided clinic phone number if they need to contact us for any reason.     Follow up:  Will follow up with patient 4 month(s).     Lazaro Crooks, PharmD

## 2024-11-20 NOTE — PROGRESS NOTES
Anticoagulation Summary  As of 2/4/2022    INR goal:  2.0-3.0   TTR:  37.7 % (9.2 mo)   INR used for dosing:  3.10 (2/4/2022)   Warfarin maintenance plan:  6 mg (6 mg x 1) every day   Weekly warfarin total:  42 mg   Plan last modified:  Hilda WadsworthD (12/29/2021)   Next INR check:  2/18/2022   Target end date:  Indefinite    Indications    Atrial fibrillation (HCC) [I48.91]  Secondary hypercoagulable state (HCC) [D68.69]             Anticoagulation Episode Summary     INR check location:      Preferred lab:      Send INR reminders to:      Comments:                  Refer to Patient Findings for HPI:  Patient Findings     Positives:  Emergency department visit, Change in medications (Pt completed 5 day course of prednison d/t COPD exacerbation), Change in diet/appetite (Eating less veggies)    Negatives:  Signs/symptoms of thrombosis, Signs/symptoms of bleeding, Laboratory test error suspected, Change in health, Change in alcohol use, Change in activity, Upcoming invasive procedure, Upcoming dental procedure, Missed doses, Extra doses, Hospital admission, Bruising, Other complaints          Vitals:    02/04/22 1547   BP: 121/79   BP Location: Right arm   Patient Position: Sitting   BP Cuff Size: Adult   Pulse: 87       Verified current warfarin dosing schedule.    Medications reconciled   Pt is not on antiplatelet therapy      A/P   INR  SUPRA-therapeutic.     Warfarin dosing recommendation: Instructed pt to eat an extra serving of greens tonight and to continue on w/ her current regimen.    Of note, pt's PCP recommended pt transition to Xarelto to minimize pt's appts/finger pokes. Pt refuses - she wishes to remain on warfarin at this time.    Pt educated to contact our clinic with any changes in medications or s/s of bleeding or thrombosis. Pt is aware to seek immediate medical attention for falls, head injury or deep cuts.    Follow up appointment in 1.5 week(s).    Chris Kang, PharmD, BCACP     Today your INR was 1.8 .      Your goal INR is  2.0-3.0 .    You have a   2.5 mg and 5 mg tablet of Coumadin (warfarin).   Take Coumadin (warfarin) as follows:    Take 7.5 mg every Monday, Wednesday, Friday; and 5 mg all other days of the week.      Come back in 2 week(s) for your next finger stick/INR blood test.        Avoid any over the counter items containing aspirin or ibuprofen, and avoid great swings in general diet.  Avoid alcohol consumption.  Please notify the INR pharmacist if you are started on any new medication including over the counter or herbal supplements. Also, please notify your INR pharmacist if any of your other prescription or over the counter medications have been discontinued.     Call [facility] at [phone] if you have any signs of abnormal bleeding/blood clot.  ------------------------------------------------------------------------------------------------------------------  Taking Warfarin Safely: Care Instructions    Your Care Instructions  Warfarin is a medicine that you take to prevent blood clots. It is often called a blood thinner. Doctors give warfarin (such as Coumadin) to reduce the risk of blood clots. You may be at risk for blood clots if you have atrial fibrillation or deep vein thrombosis. Some other health problems may also put you at risk.  Warfarin slows the amount of time it takes for your blood to clot. It can cause bleeding problems. Even if you've been taking warfarin for a while, it's important to know how to take it safely.  Foods and other medicines can affect the way warfarin works. Some can make warfarin work too well. This can cause bleeding problems. And some can make it work poorly, so that it does not prevent blood clots very well.  You will need regular blood tests to check how long it takes for your blood to form a clot. This test is called a PT or prothrombin time test. The result of the test is called

## 2024-11-24 ENCOUNTER — APPOINTMENT (OUTPATIENT)
Dept: RADIOLOGY | Facility: MEDICAL CENTER | Age: 49
End: 2024-11-24
Payer: MEDICAID

## 2024-11-24 ENCOUNTER — APPOINTMENT (OUTPATIENT)
Dept: RADIOLOGY | Facility: MEDICAL CENTER | Age: 49
End: 2024-11-24
Attending: EMERGENCY MEDICINE
Payer: MEDICAID

## 2024-11-24 ENCOUNTER — HOSPITAL ENCOUNTER (EMERGENCY)
Facility: MEDICAL CENTER | Age: 49
End: 2024-11-25
Attending: EMERGENCY MEDICINE
Payer: MEDICAID

## 2024-11-24 DIAGNOSIS — I48.20 CHRONIC A-FIB (HCC): ICD-10-CM

## 2024-11-24 DIAGNOSIS — R00.2 PALPITATIONS: ICD-10-CM

## 2024-11-24 DIAGNOSIS — D64.9 ANEMIA, UNSPECIFIED TYPE: ICD-10-CM

## 2024-11-24 DIAGNOSIS — B37.2 SKIN YEAST INFECTION: ICD-10-CM

## 2024-11-24 DIAGNOSIS — L03.115 CELLULITIS OF RIGHT LEG: ICD-10-CM

## 2024-11-24 LAB
ALBUMIN SERPL BCP-MCNC: 3.8 G/DL (ref 3.2–4.9)
ALBUMIN/GLOB SERPL: 1.3 G/DL
ALP SERPL-CCNC: 112 U/L (ref 30–99)
ALT SERPL-CCNC: 13 U/L (ref 2–50)
ANION GAP SERPL CALC-SCNC: 9 MMOL/L (ref 7–16)
ANISOCYTOSIS BLD QL SMEAR: ABNORMAL
AST SERPL-CCNC: 17 U/L (ref 12–45)
BASOPHILS # BLD AUTO: 0.1 % (ref 0–1.8)
BASOPHILS # BLD: 0.01 K/UL (ref 0–0.12)
BILIRUB SERPL-MCNC: 0.8 MG/DL (ref 0.1–1.5)
BUN SERPL-MCNC: 11 MG/DL (ref 8–22)
CALCIUM ALBUM COR SERPL-MCNC: 9.8 MG/DL (ref 8.5–10.5)
CALCIUM SERPL-MCNC: 9.6 MG/DL (ref 8.5–10.5)
CHLORIDE SERPL-SCNC: 110 MMOL/L (ref 96–112)
CO2 SERPL-SCNC: 27 MMOL/L (ref 20–33)
COMMENT 1642: NORMAL
CREAT SERPL-MCNC: 1.2 MG/DL (ref 0.5–1.4)
EKG IMPRESSION: NORMAL
EOSINOPHIL # BLD AUTO: 0.19 K/UL (ref 0–0.51)
EOSINOPHIL NFR BLD: 2.2 % (ref 0–6.9)
ERYTHROCYTE [DISTWIDTH] IN BLOOD BY AUTOMATED COUNT: 47.6 FL (ref 35.9–50)
GFR SERPLBLD CREATININE-BSD FMLA CKD-EPI: 55 ML/MIN/1.73 M 2
GLOBULIN SER CALC-MCNC: 2.9 G/DL (ref 1.9–3.5)
GLUCOSE SERPL-MCNC: 101 MG/DL (ref 65–99)
HCT VFR BLD AUTO: 34 % (ref 37–47)
HGB BLD-MCNC: 10.1 G/DL (ref 12–16)
IMM GRANULOCYTES # BLD AUTO: 0.02 K/UL (ref 0–0.11)
IMM GRANULOCYTES NFR BLD AUTO: 0.2 % (ref 0–0.9)
LYMPHOCYTES # BLD AUTO: 1.43 K/UL (ref 1–4.8)
LYMPHOCYTES NFR BLD: 16.5 % (ref 22–41)
MCH RBC QN AUTO: 25.8 PG (ref 27–33)
MCHC RBC AUTO-ENTMCNC: 29.7 G/DL (ref 32.2–35.5)
MCV RBC AUTO: 86.7 FL (ref 81.4–97.8)
MICROCYTES BLD QL SMEAR: ABNORMAL
MONOCYTES # BLD AUTO: 0.52 K/UL (ref 0–0.85)
MONOCYTES NFR BLD AUTO: 6 % (ref 0–13.4)
MORPHOLOGY BLD-IMP: NORMAL
NEUTROPHILS # BLD AUTO: 6.48 K/UL (ref 1.82–7.42)
NEUTROPHILS NFR BLD: 75 % (ref 44–72)
NRBC # BLD AUTO: 0 K/UL
NRBC BLD-RTO: 0 /100 WBC (ref 0–0.2)
NT-PROBNP SERPL IA-MCNC: 294 PG/ML (ref 0–125)
OVALOCYTES BLD QL SMEAR: NORMAL
PLATELET # BLD AUTO: 197 K/UL (ref 164–446)
PLATELET BLD QL SMEAR: NORMAL
PMV BLD AUTO: 10.5 FL (ref 9–12.9)
POIKILOCYTOSIS BLD QL SMEAR: NORMAL
POTASSIUM SERPL-SCNC: 4.1 MMOL/L (ref 3.6–5.5)
PROT SERPL-MCNC: 6.7 G/DL (ref 6–8.2)
RBC # BLD AUTO: 3.92 M/UL (ref 4.2–5.4)
RBC BLD AUTO: PRESENT
SODIUM SERPL-SCNC: 146 MMOL/L (ref 135–145)
STOMATOCYTES BLD QL SMEAR: NORMAL
TROPONIN T SERPL-MCNC: 29 NG/L (ref 6–19)
WBC # BLD AUTO: 8.7 K/UL (ref 4.8–10.8)

## 2024-11-24 PROCEDURE — 700102 HCHG RX REV CODE 250 W/ 637 OVERRIDE(OP): Mod: UD | Performed by: EMERGENCY MEDICINE

## 2024-11-24 PROCEDURE — 99284 EMERGENCY DEPT VISIT MOD MDM: CPT

## 2024-11-24 PROCEDURE — A9270 NON-COVERED ITEM OR SERVICE: HCPCS | Mod: UD | Performed by: EMERGENCY MEDICINE

## 2024-11-24 PROCEDURE — 80053 COMPREHEN METABOLIC PANEL: CPT

## 2024-11-24 PROCEDURE — 85025 COMPLETE CBC W/AUTO DIFF WBC: CPT

## 2024-11-24 PROCEDURE — 71045 X-RAY EXAM CHEST 1 VIEW: CPT

## 2024-11-24 PROCEDURE — 83880 ASSAY OF NATRIURETIC PEPTIDE: CPT

## 2024-11-24 PROCEDURE — 36415 COLL VENOUS BLD VENIPUNCTURE: CPT

## 2024-11-24 PROCEDURE — 84484 ASSAY OF TROPONIN QUANT: CPT

## 2024-11-24 PROCEDURE — 93005 ELECTROCARDIOGRAM TRACING: CPT | Performed by: EMERGENCY MEDICINE

## 2024-11-24 RX ORDER — CEPHALEXIN 500 MG/1
500 CAPSULE ORAL ONCE
Status: COMPLETED | OUTPATIENT
Start: 2024-11-24 | End: 2024-11-24

## 2024-11-24 RX ORDER — CEPHALEXIN 500 MG/1
500 CAPSULE ORAL 4 TIMES DAILY
Qty: 28 CAPSULE | Refills: 0 | Status: ACTIVE | OUTPATIENT
Start: 2024-11-24 | End: 2024-12-01

## 2024-11-24 RX ADMIN — CEPHALEXIN 500 MG: 500 CAPSULE ORAL at 23:37

## 2024-11-24 ASSESSMENT — FIBROSIS 4 INDEX: FIB4 SCORE: 0.88

## 2024-11-25 VITALS
HEART RATE: 82 BPM | DIASTOLIC BLOOD PRESSURE: 66 MMHG | BODY MASS INDEX: 50.98 KG/M2 | RESPIRATION RATE: 18 BRPM | HEIGHT: 61 IN | SYSTOLIC BLOOD PRESSURE: 135 MMHG | TEMPERATURE: 97.9 F | WEIGHT: 270 LBS | OXYGEN SATURATION: 100 %

## 2024-11-25 NOTE — ED PROVIDER NOTES
ED Provider Note    CHIEF COMPLAINT  Chief Complaint   Patient presents with    Chest Pain     PT reports CP + palpitations upon waking up around 2030 this evening.        EXTERNAL RECORDS REVIEWED  Outpatient Notes renal orthopedic note 2024 for left talus fracture    HPI/ROS  LIMITATION TO HISTORY   Select: : None  OUTSIDE HISTORIAN(S):  None    Lorene Haro is a 49 y.o. female who presents to the emergency department with report of acute on chronic palpitations with known A-fib.  On blood thinners.  Currently living at group home status post orthopedic surgery.  States that she often will have intermittent palpitations and tonight this was keeping her from falling asleep and therefore she was sent here from the group home for further evaluation.    PAST MEDICAL HISTORY   has a past medical history of A-fib ([facility]), MONI (acute kidney injury) ([facility]) (2023), Asthma, Bipolar 2 disorder ([facility]), Chickenpox, Chronic obstructive pulmonary disease (HCC), Congestive heart failure (HCC), Hypertension, On home oxygen therapy, Other psychological stress, Schizophrenic disorder (HCC), and Yeast infection of the skin (2021).    SURGICAL HISTORY   has a past surgical history that includes hysterectomy laparoscopy; colectomy; cholecystectomy; knee arthroscopy (Left); and orif, ankle (Left, 2024).    FAMILY HISTORY  Family History   Problem Relation Age of Onset    No Known Problems Mother     Cancer Sister        SOCIAL HISTORY  Social History     Tobacco Use    Smoking status: Former     Current packs/day: 0.00     Types: Cigarettes     Quit date: 10/12/2022     Years since quittin.1    Smokeless tobacco: Never   Vaping Use    Vaping status: Never Used   Substance and Sexual Activity    Alcohol use: Not Currently    Drug use: Never    Sexual activity: Not Currently       CURRENT MEDICATIONS  Home Medications       Reviewed by Aisha Womack R.N. (Registered Nurse) on 24 at 2436  Med List  "Status: Partial     Medication Last Dose Status   acetaminophen (TYLENOL) 325 MG Tab  Active   ADVAIR DISKUS 100-50 MCG/ACT AEROSOL POWDER, BREATH ACTIVATED  Active   apixaban (ELIQUIS) 5mg Tab  Active   aripiprazole (ABILIFY) 30 MG tablet  Active   ascorbic acid (VITAMIN C) 500 MG tablet  Active   atorvastatin (LIPITOR) 20 MG Tab  Active   calcium carbonate (OS-ABELARDO 500) 500 MG Tab  Active   cyclobenzaprine (FLEXERIL) 10 mg Tab  Active   ferrous sulfate 325 (65 Fe) MG tablet  Active   fluticasone (FLONASE) 50 MCG/ACT nasal spray  Active   gabapentin (NEURONTIN) 300 MG Cap  Active   Home Care Oxygen  Active   hydrOXYzine HCl (ATARAX) 25 MG Tab  Active   ketoconazole (NIZORAL) 2 % Cream  Active   lidocaine (LIDODERM) 5 % Patch  Active   metoprolol SR (TOPROL XL) 25 MG TABLET SR 24 HR  Active   montelukast (SINGULAIR) 10 MG Tab  Active   nicotine (NICODERM) 14 MG/24HR PATCH 24 HR  Active   nicotine (NICODERM) 21 MG/24HR PATCH 24 HR  Active   nicotine (NICODERM) 7 MG/24HR PATCH 24 HR  Active   polyethylene glycol/lytes (MIRALAX) Pack  Active   senna-docusate (PERICOLACE OR SENOKOT S) 8.6-50 MG Tab  Active   Vitamin D, Cholecalciferol, (CHOLECALCIFEROL) 25 MCG (1000 UT) Tab  Active                  Audit from Redirected Encounters    **Home medications have not yet been reviewed for this encounter**         ALLERGIES  Allergies   Allergen Reactions    Amoxicillin Rash and Itching     Tolerates cephalosporins, pt reports that she gets a rash all over her body and gets itchy    Penicillin G Rash and Itching     pt reports that she gets a rash all over her body and gets itchy       PHYSICAL EXAM  VITAL SIGNS: /65   Pulse 82   Temp 36.6 °C (97.9 °F) (Oral)   Resp 18   Ht 1.549 m (5' 1\")   Wt 122 kg (270 lb)   LMP  (LMP Unknown)   SpO2 100%   BMI 51.02 kg/m²      Pulse ox interpretation: I interpret this pulse ox as normal.  Constitutional: Alert in no apparent distress.  HENT: No signs of trauma, Bilateral " external ears normal, Nose normal.   Eyes: Pupils are equal and reactive  Neck: Normal range of motion, No tenderness, Supple  Cardiovascular: Irregular rate and rhythm, no murmurs.   Thorax & Lungs: Normal breath sounds, No respiratory distress, No wheezing, No chest tenderness.   Abdomen: Bowel sounds normal, Soft, No tenderness  Skin: Warm, Dry, yeast infection below breasts left greater than right  Extremities: Left lower extremity: Orthopedic boot in place.  Right lower extremity: Roughly 30% circumferential area of cellulitis to leg.  Increased warmth.  No significant tenderness.  Musculoskeletal: Good range of motion in all major joints. No tenderness to palpation or major deformities noted.   Neurologic: Alert , Normal motor function, Normal sensory function, No focal deficits noted.   Psychiatric: Affect normal, Judgment normal, Mood normal.         EKG/LABS  Results for orders placed or performed during the hospital encounter of 24   EKG    Collection Time: 24 10:14 PM   Result Value Ref Range    Report       [facility]    Test Date:  2024  Pt Name:    ADELINA MILLAN                Department: ER  MRN:        [id number]                      Room:        12  Gender:     Female                       Technician: [id number]  :        [id number]                   Requested By:ER TRIAGE PROTOCOL  Order #:    [id number]                    Reading MD: Juan Ann    Measurements  Intervals                                Axis  Rate:       79                           P:          77  NC:         145                          QRS:        65  QRSD:       120                          T:          9  QT:         365  QTc:        419    Interpretive Statements  Sinus rhythm  Atrial premature complexes  IVCD, consider atypical RBBB  Compared to ECG 10/22/2024 14:51:46  Incomplete right bundle-branch block no longer present  ST (T wave) deviation no longer  present  Electronically Signed On 11- 23:03:30 PST by Juan Ann     CBC with Differential    Collection Time: 11/24/24 10:22 PM   Result Value Ref Range    WBC 8.7 4.8 - 10.8 K/uL    RBC 3.92 (L) 4.20 - 5.40 M/uL    Hemoglobin 10.1 (L) 12.0 - 16.0 g/dL    Hematocrit 34.0 (L) 37.0 - 47.0 %    MCV 86.7 81.4 - 97.8 fL    MCH 25.8 (L) 27.0 - 33.0 pg    MCHC 29.7 (L) 32.2 - 35.5 g/dL    RDW 47.6 35.9 - 50.0 fL    Platelet Count 197 164 - 446 K/uL    MPV 10.5 9.0 - 12.9 fL    Neutrophils-Polys 75.00 (H) 44.00 - 72.00 %    Lymphocytes 16.50 (L) 22.00 - 41.00 %    Monocytes 6.00 0.00 - 13.40 %    Eosinophils 2.20 0.00 - 6.90 %    Basophils 0.10 0.00 - 1.80 %    Immature Granulocytes 0.20 0.00 - 0.90 %    Nucleated RBC 0.00 0.00 - 0.20 /100 WBC    Neutrophils (Absolute) 6.48 1.82 - 7.42 K/uL    Lymphs (Absolute) 1.43 1.00 - 4.80 K/uL    Monos (Absolute) 0.52 0.00 - 0.85 K/uL    Eos (Absolute) 0.19 0.00 - 0.51 K/uL    Baso (Absolute) 0.01 0.00 - 0.12 K/uL    Immature Granulocytes (abs) 0.02 0.00 - 0.11 K/uL    NRBC (Absolute) 0.00 K/uL    Anisocytosis 1+     Microcytosis 1+    Complete Metabolic Panel (CMP)    Collection Time: 11/24/24 10:22 PM   Result Value Ref Range    Sodium 146 (H) 135 - 145 mmol/L    Potassium 4.1 3.6 - 5.5 mmol/L    Chloride 110 96 - 112 mmol/L    Co2 27 20 - 33 mmol/L    Anion Gap 9.0 7.0 - 16.0    Glucose 101 (H) 65 - 99 mg/dL    Bun 11 8 - 22 mg/dL    Creatinine 1.20 0.50 - 1.40 mg/dL    Calcium 9.6 8.5 - 10.5 mg/dL    Correct Calcium 9.8 8.5 - 10.5 mg/dL    AST(SGOT) 17 12 - 45 U/L    ALT(SGPT) 13 2 - 50 U/L    Alkaline Phosphatase 112 (H) 30 - 99 U/L    Total Bilirubin 0.8 0.1 - 1.5 mg/dL    Albumin 3.8 3.2 - 4.9 g/dL    Total Protein 6.7 6.0 - 8.2 g/dL    Globulin 2.9 1.9 - 3.5 g/dL    A-G Ratio 1.3 g/dL   proBrain Natriuretic Peptide, NT (BNP)    Collection Time: 11/24/24 10:22 PM   Result Value Ref Range    NT-proBNP 294 (H) 0 - 125 pg/mL   Troponins NOW    Collection Time: 11/24/24  10:22 PM   Result Value Ref Range    Troponin T 29 (H) 6 - 19 ng/L   ESTIMATED GFR    Collection Time: 11/24/24 10:22 PM   Result Value Ref Range    GFR (CKD-EPI) 55 (A) >60 mL/min/1.73 m 2   PLATELET ESTIMATE    Collection Time: 11/24/24 10:22 PM   Result Value Ref Range    Plt Estimation Normal    MORPHOLOGY    Collection Time: 11/24/24 10:22 PM   Result Value Ref Range    RBC Morphology Present     Poikilocytosis 1+     Ovalocytes 1+     Stomatocytes 1+    PERIPHERAL SMEAR REVIEW    Collection Time: 11/24/24 10:22 PM   Result Value Ref Range    Peripheral Smear Review see below    DIFFERENTIAL COMMENT    Collection Time: 11/24/24 10:22 PM   Result Value Ref Range    Comments-Diff see below      *Note: Due to a large number of results and/or encounters for the requested time period, some results have not been displayed. A complete set of results can be found in Results Review.       I have independently interpreted this EKG    RADIOLOGY/PROCEDURES   I have independently interpreted the diagnostic imaging associated with this visit and am waiting the final reading from the radiologist.   My preliminary interpretation is as follows: Cardiomegaly noted    Radiologist interpretation:  DX-CHEST-PORTABLE (1 VIEW)   Final Result         1.  Left basilar atelectasis, no focal infiltrate   2.  Cardiomegaly   3.  Atherosclerosis          COURSE & MEDICAL DECISION MAKING    ASSESSMENT, COURSE AND PLAN  Care Narrative: 49-year-old presented to the emerged part with above presentation.  Chronic history of A-fib tonight with palpitations.  Additional exam concerning for possible early right leg cellulitis and also bilateral submammary yeast infection.  Will complete labs for screening and will be kept on telemetry for further monitoring here in the ER    DISPOSITION AND DISCUSSIONS  I have discussed management of the patient with the following physicians and JAY's: None    Discussion of management with other QHP or appropriate  source(s): Pharmacy for medication verification      Escalation of care considered, and ultimately not performed:acute inpatient care management, however at this time, the patient is most appropriate for outpatient management      49-year-old female presented emerged part with above presentation.  Likely acute on chronic palpitations secondary to A-fib/premature beats as noted tonight.  Additionally again dermal findings as above.  Will treat right leg cellulitis with oral antibiotics.  I do not believe this requires inpatient IV antibiotic care here in the hospital with this presentation.  Furthermore her also skilled nursing facility can continue with additional dermal care, cleansing and yeast treatment with OTC medications.    FINAL DIAGNOSIS  1. Chronic a-fib (HCC)    2. Palpitations    3. Cellulitis of right leg    4. Skin yeast infection    5. Anemia, unspecified type         Electronically signed by: Juan Ann M.D., 11/24/2024 10:26 PM

## 2024-11-25 NOTE — ED NOTES
Pt medicated per MAR, pt tolerated , pt attached to monitor , call light in reach , no needs at this time   Pt pending transport to [place]

## 2024-11-25 NOTE — ED TRIAGE NOTES
"Chief Complaint   Patient presents with    Chest Pain     PT reports CP + palpitations upon waking up around 2030 this evening.        BIB EMS to [facility], pt on monitor, provided call bell and in gown, labs drawn and sent.    Medications given en route: none    /65   Pulse 82   Resp 18   Ht 1.549 m (5' 1\")   Wt 122 kg (270 lb)   LMP  (LMP Unknown)   SpO2 100%   BMI 51.02 kg/m²     "

## 2024-11-25 NOTE — DISCHARGE PLANNING
RN communication with RN CM to set-up transportation back to [facility]. RN report given. RN CM called [facility] [phone]. Transport set-up via [facility] on 4 L oxygen with [facility] @1:15am. [facility]/transfer packet given to RN.  Res #[id number]

## 2024-12-02 ENCOUNTER — OFFICE VISIT (OUTPATIENT)
Dept: CARDIOLOGY | Facility: MEDICAL CENTER | Age: 49
End: 2024-12-02
Attending: INTERNAL MEDICINE
Payer: MEDICAID

## 2024-12-02 VITALS
BODY MASS INDEX: 51.02 KG/M2 | SYSTOLIC BLOOD PRESSURE: 100 MMHG | DIASTOLIC BLOOD PRESSURE: 54 MMHG | HEIGHT: 61 IN | RESPIRATION RATE: 18 BRPM | HEART RATE: 69 BPM | OXYGEN SATURATION: 96 %

## 2024-12-02 DIAGNOSIS — E78.2 MIXED HYPERLIPIDEMIA: ICD-10-CM

## 2024-12-02 DIAGNOSIS — I48.0 PAROXYSMAL ATRIAL FIBRILLATION (HCC): ICD-10-CM

## 2024-12-02 DIAGNOSIS — D68.69 SECONDARY HYPERCOAGULABLE STATE (HCC): ICD-10-CM

## 2024-12-02 PROCEDURE — 99214 OFFICE O/P EST MOD 30 MIN: CPT | Performed by: INTERNAL MEDICINE

## 2024-12-02 PROCEDURE — 3074F SYST BP LT 130 MM HG: CPT | Performed by: INTERNAL MEDICINE

## 2024-12-02 PROCEDURE — 99213 OFFICE O/P EST LOW 20 MIN: CPT | Performed by: INTERNAL MEDICINE

## 2024-12-02 PROCEDURE — 3078F DIAST BP <80 MM HG: CPT | Performed by: INTERNAL MEDICINE

## 2024-12-02 ASSESSMENT — ENCOUNTER SYMPTOMS
BACK PAIN: 0
WEIGHT GAIN: 0
FLANK PAIN: 0
ALTERED MENTAL STATUS: 0
DECREASED APPETITE: 0
FEVER: 0
CONSTIPATION: 0
IRREGULAR HEARTBEAT: 0
DEPRESSION: 0
ORTHOPNEA: 0
WEIGHT LOSS: 0
DIARRHEA: 0
CLAUDICATION: 0
ABDOMINAL PAIN: 0
COUGH: 0
PND: 0
DIZZINESS: 0
SYNCOPE: 0
VOMITING: 0
NAUSEA: 0
PALPITATIONS: 0
NEAR-SYNCOPE: 0
DYSPNEA ON EXERTION: 0
HEARTBURN: 0
SHORTNESS OF BREATH: 0
BLURRED VISION: 0

## 2024-12-02 NOTE — PROGRESS NOTES
Cardiology Note    Chief Complaint   Patient presents with    Follow-Up     Paroxysmal atrial fibrillation (HCC)    Hyperlipidemia    Other     Acute on chronic systolic heart failure (HCC)          History of Present Illness: Lorene Haro is a 49 y.o. female PMH KATHIA on cpap, restrictive/obstructive lung disease, morbid obesity, former smoker, paroxysmal AF, diastolic heart failure who presents for follow up visit.    States overall remains unchanged. No cardiac complaints. Hasn't needed loop diuretic. Compliant with medications and denies adverse effects. She quit smoking in October following foot injury she describes. She credits inability to get outside to smoke tobacco.     Review of Systems   Constitutional: Negative for decreased appetite, fever, malaise/fatigue, weight gain and weight loss.   HENT:  Negative for congestion and nosebleeds.    Eyes:  Negative for blurred vision.   Cardiovascular:  Negative for chest pain, claudication, dyspnea on exertion, irregular heartbeat, leg swelling, near-syncope, orthopnea, palpitations, paroxysmal nocturnal dyspnea and syncope.   Respiratory:  Negative for cough and shortness of breath.    Endocrine: Negative for cold intolerance and heat intolerance.   Skin:  Negative for rash.   Musculoskeletal:  Negative for back pain.   Gastrointestinal:  Negative for abdominal pain, constipation, diarrhea, heartburn, melena, nausea and vomiting.   Genitourinary:  Negative for dysuria, flank pain and hematuria.   Neurological:  Negative for dizziness.   Psychiatric/Behavioral:  Negative for altered mental status and depression.          Past Medical History:   Diagnosis Date    A-fib (HCC)     MONI (acute kidney injury) ([facility]ly [facility]) 08/09/2023    Asthma     Bipolar 2 disorder (HCC)     Chickenpox     Chronic obstructive pulmonary disease (HCC)     Congestive heart failure (HCC)     Hypertension     On home oxygen therapy     Other psychological stress     Schizophrenic disorder (HCC)      Yeast infection of the skin 04/01/2021         Past Surgical History:   Procedure Laterality Date    ORIF, ANKLE Left 11/2/2024    Procedure: ORIF, ANKLE- TALUS;  Surgeon: Roman Rosa M.D.;  Location: SURGERY [facility];  Service: Orthopedics    CHOLECYSTECTOMY      COLECTOMY      HYSTERECTOMY LAPAROSCOPY      KNEE ARTHROSCOPY Left          Current Outpatient Medications   Medication Sig Dispense Refill    apixaban (ELIQUIS) 5mg Tab Take 1 Tablet by mouth 2 times a day. 60 Tablet 5    calcium carbonate (OS-ABELARDO 500) 500 MG Tab Take 1 Tablet by mouth 2 times a day with meals.      cyclobenzaprine (FLEXERIL) 10 mg Tab Take 1 Tablet by mouth 3 times a day as needed for Moderate Pain or Muscle Spasms. 15 Tablet 1    gabapentin (NEURONTIN) 300 MG Cap Take 1 Capsule by mouth 3 times a day.      hydrOXYzine HCl (ATARAX) 25 MG Tab Take 1 Tablet by mouth 3 times a day as needed for Itching.      senna-docusate (PERICOLACE OR SENOKOT S) 8.6-50 MG Tab Take 2 Tablets by mouth every evening.      polyethylene glycol/lytes (MIRALAX) Pack Take 1 Packet by mouth 1 time a day as needed (if no bowel movement in last 2 days).      Vitamin D, Cholecalciferol, (CHOLECALCIFEROL) 25 MCG (1000 UT) Tab Take 2 Tablets by mouth every day. 60 Tablet 0    nicotine (NICODERM) 21 MG/24HR PATCH 24 HR Place 1 Patch on the skin every 24 hours.      ascorbic acid (VITAMIN C) 500 MG tablet Take 500 mg by mouth every day.      ferrous sulfate 325 (65 Fe) MG tablet Take 325 mg by mouth every 48 hours.      ketoconazole (NIZORAL) 2 % Cream Apply 1 Application topically 2 times a day.      acetaminophen (TYLENOL) 325 MG Tab Take 650 mg by mouth every four hours as needed for Moderate Pain.      nicotine (NICODERM) 7 MG/24HR PATCH 24 HR       lidocaine (LIDODERM) 5 % Patch Place 1 Patch on the skin every 12 hours.      nicotine (NICODERM) 14 MG/24HR PATCH 24 HR Place 1 Patch on the skin every 24 hours.      Home Care Oxygen Inhale 2 L/min every  evening. DME = Preferred      ADVAIR DISKUS 100-50 MCG/ACT AEROSOL POWDER, BREATH ACTIVATED Inhale 1 Puff 2 times a day. 180 Each 3    montelukast (SINGULAIR) 10 MG Tab Take 1 Tablet by mouth every evening. 90 Tablet 3    metoprolol SR (TOPROL XL) 25 MG TABLET SR 24 HR Take 25 mg by mouth every day.      fluticasone (FLONASE) 50 MCG/ACT nasal spray Administer 2 Sprays into each nostril every morning.      aripiprazole (ABILIFY) 30 MG tablet Take 1 Tablet by mouth every morning. Indications: Major Depressive Disorder 30 Tablet 0    atorvastatin (LIPITOR) 20 MG Tab Take 1 Tablet by mouth at bedtime. 30 Tablet 0     No current facility-administered medications for this visit.         Allergies   Allergen Reactions    Amoxicillin Rash and Itching     Tolerates cephalosporins, pt reports that she gets a rash all over her body and gets itchy    Penicillin G Rash and Itching     pt reports that she gets a rash all over her body and gets itchy         Family History   Problem Relation Age of Onset    No Known Problems Mother     Cancer Sister          Social History     Socioeconomic History    Marital status: Single     Spouse name: Not on file    Number of children: Not on file    Years of education: Not on file    Highest education level: Not on file   Occupational History    Not on file   Tobacco Use    Smoking status: Former     Current packs/day: 0.00     Types: Cigarettes     Quit date: 10/12/2022     Years since quittin.1    Smokeless tobacco: Never   Vaping Use    Vaping status: Never Used   Substance and Sexual Activity    Alcohol use: Not Currently    Drug use: Never    Sexual activity: Not Currently   Other Topics Concern    Not on file   Social History Narrative    ** Merged History Encounter **         From [place]     Social Drivers of Health     Financial Resource Strain: Medium Risk (2022)    Overall Financial Resource Strain (CARDIA)     Difficulty of Paying Living Expenses: Somewhat hard   Food  "Insecurity: No Food Insecurity (10/31/2024)    Hunger Vital Sign     Worried About Running Out of Food in the Last Year: Never true     Ran Out of Food in the Last Year: Never true   Transportation Needs: No Transportation Needs (10/31/2024)    PRAPARE - Transportation     Lack of Transportation (Medical): No     Lack of Transportation (Non-Medical): No   Physical Activity: Not on file   Stress: Not on file   Social Connections: Not on file   Intimate Partner Violence: Not At Risk (10/31/2024)    Humiliation, Afraid, Rape, and Kick questionnaire     Fear of Current or Ex-Partner: No     Emotionally Abused: No     Physically Abused: No     Sexually Abused: No   Housing Stability: Low Risk  (10/31/2024)    Housing Stability Vital Sign     Unable to Pay for Housing in the Last Year: No     Number of Times Moved in the Last Year: 1     Homeless in the Last Year: No         Physical Exam:  Ambulatory Vitals  /54 (BP Location: Left arm, Patient Position: Sitting, BP Cuff Size: Adult)   Pulse 69   Resp 18   Ht 1.549 m (5' 1\")   SpO2 96%    BP Readings from Last 4 Encounters:   12/02/24 100/54   11/25/24 135/66   11/07/24 110/60   11/03/24 120/53     Weight/BMI:   Vitals:    12/02/24 1540   BP: 100/54   Height: 1.549 m (5' 1\")    Body mass index is 51.02 kg/m².  Wt Readings from Last 4 Encounters:   11/24/24 122 kg (270 lb)   11/07/24 122 kg (270 lb)   10/30/24 124 kg (273 lb)   10/30/24 (!) 126 kg (278 lb)       Physical Exam  Constitutional:       General: She is not in acute distress.  HENT:      Head: Normocephalic and atraumatic.   Eyes:      Conjunctiva/sclera: Conjunctivae normal.      Pupils: Pupils are equal, round, and reactive to light.   Neck:      Vascular: No JVD.   Cardiovascular:      Rate and Rhythm: Normal rate and regular rhythm.      Heart sounds: Normal heart sounds. No murmur heard.     No friction rub. No gallop.   Pulmonary:      Effort: Pulmonary effort is normal. No respiratory distress. " "     Breath sounds: Normal breath sounds. No wheezing or rales.   Chest:      Chest wall: No tenderness.   Abdominal:      General: Bowel sounds are normal. There is no distension.      Palpations: Abdomen is soft.   Musculoskeletal:      Cervical back: Normal range of motion and neck supple.   Skin:     General: Skin is warm and dry.   Neurological:      Mental Status: She is alert and oriented to person, place, and time.   Psychiatric:         Mood and Affect: Affect normal.         Judgment: Judgment normal.         Lab Data Review:  Lab Results   Component Value Date/Time    CHOLSTRLTOT 108 07/31/2024 01:51 AM    LDL 43 07/31/2024 01:51 AM    HDL 43 07/31/2024 01:51 AM    TRIGLYCERIDE 109 07/31/2024 01:51 AM       Lab Results   Component Value Date/Time    SODIUM 146 (H) 11/24/2024 10:22 PM    POTASSIUM 4.1 11/24/2024 10:22 PM    CHLORIDE 110 11/24/2024 10:22 PM    CO2 27 11/24/2024 10:22 PM    GLUCOSE 101 (H) 11/24/2024 10:22 PM    BUN 11 11/24/2024 10:22 PM    CREATININE 1.20 11/24/2024 10:22 PM    CREATININE 0.9 09/11/2008 01:45 PM    BUNCREATRAT 7.5 05/19/2022 02:18 AM     CrCl cannot be calculated (Patient's most recent lab result is older than the maximum 7 days allowed.).  Lab Results   Component Value Date/Time    ALKPHOSPHAT 112 (H) 11/24/2024 10:22 PM    ASTSGOT 17 11/24/2024 10:22 PM    ALTSGPT 13 11/24/2024 10:22 PM    TBILIRUBIN 0.8 11/24/2024 10:22 PM      Lab Results   Component Value Date/Time    WBC 8.7 11/24/2024 10:22 PM     Lab Results   Component Value Date/Time    HBA1C 6.1 (H) 10/03/2024 08:01 AM     No components found for: \"TROP\"      Cardiac Imaging and Procedures Review:      TTE 11/18/2019  CONCLUSIONS  Compared to the images of the study done on 11/14/2018 - there has been   no significant change.   Normal left ventricular systolic function.  Left ventricular ejection fraction is visually estimated to be 60%.  Normal diastolic function.  Normal right ventricular systolic " function.  Mild mitral regurgitation.  Moderate tricuspid regurgitation.  Right ventricular systolic pressure is estimated to be 65 mmHg.    TTE 11/14/2018  CONCLUSIONS  No prior study is available for comparison.   Technically difficult study - adequate information is obtained.   Grossly normal left ventricular systolic function.  Left ventricular ejection fraction is visually estimated to be 65%.  Probable mild aortic stenosis.  Dilated inferior vena cava without   inspiratory collapse.  Estimated right ventricular systolic pressure is   60 mmHg.    PFTs 9/2021  Interpretation;   Baseline spirometry shows significant reduction in FVC at 1.45 L or 46% predicted and FEV1 at 1.24 L or 48% predicted with FEV1/FVC ratio of 86 suggesting restriction.  There is significant bronchodilator response with postbronchodilator FEV1 of 1.48 L or 58% predicted.  Total lung capacity is just below lower limits of normal at 3.61 L or 78% predicted.  Diffusion capacity is within normal limits at 85% predicted.  Pulmonary function testing shows mixed restrictive obstructive defect with significant bronchodilator response.  This could be consistent with COPD in a patient with morbid obesity.  Correlate clinically and with imaging    MPI spect stress 2/2022  NUCLEAR IMAGING INTERPRETATION   No evidence of significant jeopardized viable myocardium or prior myocardial    infarction.   Normal left ventricular size, ejection fraction, and wall motion.   ECG INTERPRETATION   Negative stress ECG for ischemia.    Event monitor 5/2022  Procedure: Look[employer]hel monitor; 5d 23h; 4/13/22   Indication: ?paroxysmal AF   Quality: good   Findings:   Underlying rhythm: Predominantly sinus rhythm with average rate 73 bpm. No atrial fibrillation nor flutter.   Atrial events: Rare ectopy.   Ventricular events: Rare ectopy.   Patient events: No patient triggered events.   Impressions:   No atrial fibrillation nor flutter detected.     TTE  10/2022  CONCLUSIONS  Normal left ventricular size, wall thickness, and systolic function.  Prior echocardiogram 2/27/2022, now without evidence of intracavitary   gradient.    TTE 5/21/24  CONCLUSIONS  Compared to the prior study on 10/1/2022.   Moderate concentric LVH and   noted elevated RV systolic pressure.   Normal left ventricular chamber size. Moderate concentric left   ventricular hypertrophy. Normal left ventricular systolic function. The   left ventricular ejection fraction is visually estimated to be 60-65%.   Normal regional wall motion. Normal diastolic function.  Mild tricuspid regurgitation with elevated RV systolic pressure of 35   mmHg.      Medical Decision Making:  Problem List Items Addressed This Visit       AF (atrial fibrillation) (HCC)    Hyperlipidemia    Secondary hypercoagulable state (HCC)         Atrial fibrillation, paroxysmal - recurrence on loop recorder 1/11/23 8097. Continue doac for cva prevention; chadsvasc 2. Metoprolol for rate control.    Diastolic HF - NYHA I - stable off lasix. Monitor.     Loop recorder implanted 7/2022. Regular interrogations.    It was my pleasure to meet with Ms. Haro.

## 2024-12-05 ENCOUNTER — HOSPITAL ENCOUNTER (EMERGENCY)
Facility: MEDICAL CENTER | Age: 49
End: 2024-12-06
Attending: EMERGENCY MEDICINE
Payer: MEDICAID

## 2024-12-05 ENCOUNTER — APPOINTMENT (OUTPATIENT)
Dept: RADIOLOGY | Facility: MEDICAL CENTER | Age: 49
End: 2024-12-05
Attending: EMERGENCY MEDICINE
Payer: MEDICAID

## 2024-12-05 DIAGNOSIS — M25.511 CHRONIC RIGHT SHOULDER PAIN: ICD-10-CM

## 2024-12-05 DIAGNOSIS — G89.29 CHRONIC RIGHT SHOULDER PAIN: ICD-10-CM

## 2024-12-05 DIAGNOSIS — R07.9 CHEST PAIN, UNSPECIFIED TYPE: ICD-10-CM

## 2024-12-05 DIAGNOSIS — J18.9 PNEUMONIA OF LEFT LUNG DUE TO INFECTIOUS ORGANISM, UNSPECIFIED PART OF LUNG: ICD-10-CM

## 2024-12-05 LAB
ALBUMIN SERPL BCP-MCNC: 4.2 G/DL (ref 3.2–4.9)
ALBUMIN/GLOB SERPL: 1.4 G/DL
ALP SERPL-CCNC: 131 U/L (ref 30–99)
ALT SERPL-CCNC: 12 U/L (ref 2–50)
ANION GAP SERPL CALC-SCNC: 9 MMOL/L (ref 7–16)
AST SERPL-CCNC: 17 U/L (ref 12–45)
BASOPHILS # BLD AUTO: 0.3 % (ref 0–1.8)
BASOPHILS # BLD: 0.03 K/UL (ref 0–0.12)
BILIRUB SERPL-MCNC: 0.8 MG/DL (ref 0.1–1.5)
BUN SERPL-MCNC: 15 MG/DL (ref 8–22)
CALCIUM ALBUM COR SERPL-MCNC: 9.7 MG/DL (ref 8.5–10.5)
CALCIUM SERPL-MCNC: 9.9 MG/DL (ref 8.5–10.5)
CHLORIDE SERPL-SCNC: 101 MMOL/L (ref 96–112)
CO2 SERPL-SCNC: 30 MMOL/L (ref 20–33)
CREAT SERPL-MCNC: 1.05 MG/DL (ref 0.5–1.4)
EKG IMPRESSION: NORMAL
EOSINOPHIL # BLD AUTO: 0.14 K/UL (ref 0–0.51)
EOSINOPHIL NFR BLD: 1.4 % (ref 0–6.9)
ERYTHROCYTE [DISTWIDTH] IN BLOOD BY AUTOMATED COUNT: 46.7 FL (ref 35.9–50)
GFR SERPLBLD CREATININE-BSD FMLA CKD-EPI: 64 ML/MIN/1.73 M 2
GLOBULIN SER CALC-MCNC: 3.1 G/DL (ref 1.9–3.5)
GLUCOSE SERPL-MCNC: 103 MG/DL (ref 65–99)
HCT VFR BLD AUTO: 34.6 % (ref 37–47)
HGB BLD-MCNC: 11 G/DL (ref 12–16)
IMM GRANULOCYTES # BLD AUTO: 0.04 K/UL (ref 0–0.11)
IMM GRANULOCYTES NFR BLD AUTO: 0.4 % (ref 0–0.9)
LYMPHOCYTES # BLD AUTO: 1.95 K/UL (ref 1–4.8)
LYMPHOCYTES NFR BLD: 19.1 % (ref 22–41)
MCH RBC QN AUTO: 27 PG (ref 27–33)
MCHC RBC AUTO-ENTMCNC: 31.8 G/DL (ref 32.2–35.5)
MCV RBC AUTO: 84.8 FL (ref 81.4–97.8)
MONOCYTES # BLD AUTO: 0.68 K/UL (ref 0–0.85)
MONOCYTES NFR BLD AUTO: 6.7 % (ref 0–13.4)
NEUTROPHILS # BLD AUTO: 7.35 K/UL (ref 1.82–7.42)
NEUTROPHILS NFR BLD: 72.1 % (ref 44–72)
NRBC # BLD AUTO: 0 K/UL
NRBC BLD-RTO: 0 /100 WBC (ref 0–0.2)
NT-PROBNP SERPL IA-MCNC: 182 PG/ML (ref 0–125)
PLATELET # BLD AUTO: 226 K/UL (ref 164–446)
PMV BLD AUTO: 10.8 FL (ref 9–12.9)
POTASSIUM SERPL-SCNC: 4.1 MMOL/L (ref 3.6–5.5)
PROT SERPL-MCNC: 7.3 G/DL (ref 6–8.2)
RBC # BLD AUTO: 4.08 M/UL (ref 4.2–5.4)
SODIUM SERPL-SCNC: 140 MMOL/L (ref 135–145)
TROPONIN T SERPL-MCNC: 29 NG/L (ref 6–19)
WBC # BLD AUTO: 10.2 K/UL (ref 4.8–10.8)

## 2024-12-05 PROCEDURE — 36415 COLL VENOUS BLD VENIPUNCTURE: CPT

## 2024-12-05 PROCEDURE — 96374 THER/PROPH/DIAG INJ IV PUSH: CPT

## 2024-12-05 PROCEDURE — 93005 ELECTROCARDIOGRAM TRACING: CPT | Mod: TC

## 2024-12-05 PROCEDURE — 93005 ELECTROCARDIOGRAM TRACING: CPT | Mod: TC | Performed by: EMERGENCY MEDICINE

## 2024-12-05 PROCEDURE — 85025 COMPLETE CBC W/AUTO DIFF WBC: CPT

## 2024-12-05 PROCEDURE — 99285 EMERGENCY DEPT VISIT HI MDM: CPT

## 2024-12-05 PROCEDURE — 83880 ASSAY OF NATRIURETIC PEPTIDE: CPT

## 2024-12-05 PROCEDURE — 80053 COMPREHEN METABOLIC PANEL: CPT

## 2024-12-05 PROCEDURE — 84484 ASSAY OF TROPONIN QUANT: CPT

## 2024-12-05 PROCEDURE — 700111 HCHG RX REV CODE 636 W/ 250 OVERRIDE (IP): Mod: JZ,UD | Performed by: EMERGENCY MEDICINE

## 2024-12-05 PROCEDURE — 71045 X-RAY EXAM CHEST 1 VIEW: CPT

## 2024-12-05 RX ORDER — KETOROLAC TROMETHAMINE 15 MG/ML
15 INJECTION, SOLUTION INTRAMUSCULAR; INTRAVENOUS ONCE
Status: COMPLETED | OUTPATIENT
Start: 2024-12-06 | End: 2024-12-05

## 2024-12-05 RX ADMIN — KETOROLAC TROMETHAMINE 15 MG: 15 INJECTION, SOLUTION INTRAMUSCULAR; INTRAVENOUS at 23:42

## 2024-12-05 ASSESSMENT — FIBROSIS 4 INDEX: FIB4 SCORE: 1.22

## 2024-12-05 ASSESSMENT — ENCOUNTER SYMPTOMS
WHEEZING: 0
DIZZINESS: 0
COUGH: 0
WEAKNESS: 0
HEADACHES: 0
NAUSEA: 0
VOMITING: 0
HEARTBURN: 0
SHORTNESS OF BREATH: 1
DIARRHEA: 0
CHILLS: 0
HEMOPTYSIS: 0
MYALGIAS: 0
SPUTUM PRODUCTION: 0
DIAPHORESIS: 0
FEVER: 0
PALPITATIONS: 0
SINUS PAIN: 0
FALLS: 0

## 2024-12-06 ENCOUNTER — OFFICE VISIT (OUTPATIENT)
Dept: SLEEP MEDICINE | Facility: MEDICAL CENTER | Age: 49
End: 2024-12-06
Payer: MEDICAID

## 2024-12-06 ENCOUNTER — PHARMACY VISIT (OUTPATIENT)
Dept: PHARMACY | Facility: MEDICAL CENTER | Age: 49
End: 2024-12-06
Payer: COMMERCIAL

## 2024-12-06 VITALS
HEIGHT: 62 IN | BODY MASS INDEX: 48.58 KG/M2 | HEART RATE: 72 BPM | TEMPERATURE: 98 F | OXYGEN SATURATION: 97 % | DIASTOLIC BLOOD PRESSURE: 57 MMHG | WEIGHT: 264 LBS | RESPIRATION RATE: 14 BRPM | SYSTOLIC BLOOD PRESSURE: 116 MMHG

## 2024-12-06 VITALS
BODY MASS INDEX: 51.35 KG/M2 | SYSTOLIC BLOOD PRESSURE: 122 MMHG | OXYGEN SATURATION: 95 % | DIASTOLIC BLOOD PRESSURE: 76 MMHG | HEART RATE: 67 BPM | HEIGHT: 61 IN | WEIGHT: 272 LBS

## 2024-12-06 DIAGNOSIS — J96.11 CHRONIC RESPIRATORY FAILURE WITH HYPOXIA (HCC): ICD-10-CM

## 2024-12-06 DIAGNOSIS — F17.201 TOBACCO ABUSE, IN REMISSION: ICD-10-CM

## 2024-12-06 DIAGNOSIS — J44.89 ASTHMA-COPD OVERLAP SYNDROME (HCC): ICD-10-CM

## 2024-12-06 DIAGNOSIS — G47.33 OSA (OBSTRUCTIVE SLEEP APNEA): ICD-10-CM

## 2024-12-06 DIAGNOSIS — R91.8 PULMONARY NODULES: ICD-10-CM

## 2024-12-06 LAB — EKG IMPRESSION: NORMAL

## 2024-12-06 PROCEDURE — 3078F DIAST BP <80 MM HG: CPT

## 2024-12-06 PROCEDURE — RXMED WILLOW AMBULATORY MEDICATION CHARGE: Performed by: EMERGENCY MEDICINE

## 2024-12-06 PROCEDURE — 99213 OFFICE O/P EST LOW 20 MIN: CPT

## 2024-12-06 PROCEDURE — 3074F SYST BP LT 130 MM HG: CPT

## 2024-12-06 PROCEDURE — 99214 OFFICE O/P EST MOD 30 MIN: CPT

## 2024-12-06 RX ORDER — AZITHROMYCIN 250 MG/1
TABLET, FILM COATED ORAL
Qty: 6 TABLET | Refills: 0 | Status: ACTIVE | OUTPATIENT
Start: 2024-12-06 | End: 2024-12-11

## 2024-12-06 ASSESSMENT — FIBROSIS 4 INDEX: FIB4 SCORE: 1.11

## 2024-12-06 NOTE — ED TRIAGE NOTES
Lorene Nicole Tahir  49 y.o. female  Chief Complaint   Patient presents with    Chest Pain     BIB EMS from [facility]. Chronic Left sided chest pain radiating to the back. Started prior to coming to ER while laying down. Pain reproducible with palpation.     Shoulder Pain     Chronic right shoulder pain. Denies trauma.      On O2 at 4LPM at baseline.    Pt is GCS 15, speaking in full sentences, follows commands and responds appropriately to questions. Resp are even and unlabored.

## 2024-12-06 NOTE — PROGRESS NOTES
Pulmonary Clinic Note    Date of Visit: 12/6/2024     Chief Complaint:  Chief Complaint   Patient presents with    Follow-Up     Last seen 5/30/24 HENRIETTA Harper    Results     PFT 10/30/24     HPI:   Lorene Haro is a very pleasant 47 y.o. year old female current  smoker (20+ pack-years, quit in 5/2024), with a PMHx of chronic back pain with sciatica, COVID-19 pneumonia in October 2021, atrial fibrillation, anxiety, secondary hypercoagulable state, lower extremity edema, bipolar/schizophrenia, obesity hypoventilation syndrome  who presented to the Pulmonary Clinic for a regular follow up. Last seen in the office on 5/30/2024 with myself.     Patient is followed by the pulmonary office for the asthma/COPD overlap.  PFTs in 2021 show a restrictive process FVC of 1.45 L or 46%, FEV1 1.24 L or 48%, FEV1/FVC 86%, %, TLC 78%, and DLCO 85% predicted, with a positive bronchodilator response.  CTA in September and May 2022 did not show any abnormalities.  Echocardiogram in March 2022 showed RVSP of 35 mmHg.  Patient does carry a diagnosis of KATHIA and is currently on IVAPS machine being followed by the sleep center.  She has frequent ER visits for chest pain and/or shortness of breath.  CT chest in April 2024 does not show PE or emphysema but does show a 3 mm RML nodule, which is evident on the CT abdomen pelvis from July 2023.  Echocardiogram in May 2024 shows moderate LV H and elevated RV systolic pressures at 35 mmHg.  The LV was normal size and had normal systolic function with a EF of 60-65%.  Normal diastolic function.    Interval Events:  5/20/2024-5/22/2024-  Patient was hospitalized for acute on chronic hypoxic respiratory failure.  The concern was for volume overload given this patient was noncompliant with IVAPS and HTN.  Patient did have elevated BNP.  Patient was treated with diuretics.  Echocardiogram showed elevated RVSP of 35 mmHg.  Patient is currently on 2 LPM of oxygen at night.    5/30/2024-   Patient states her breathing has improved since her discharge.  Symptomatically, she states that she is short of breath with mMRC of 1-2, which is her baseline.  She denies any significant cough, sputum production, or wheezing.  Patient quit smoking 1 week ago and is using patches and is motivated to continue abstinence.  She is currently on Advair 100, Singulair, and albuterol as needed.  She does need refills on all of these.  She continues to use benefit from 2 LPM of oxygen at night and reports that she never received her AVAPS machine.    12/5/2024-  Patient reported to the ED with chronic complaints of right shoulder plain and chest pain.  CXR showed left midlung and left lower lobe opacities suggesting minimal infiltrates and trace left pleural effusion.  Patient was started on azithromycin for CAP.    12/6/2024-  Patient states that she is short of breath with mMRC of 4 but denies any significant cough, sputum production, or wheezing.  She continues to use and benefit from Advair 100, Singulair, and albuterol as needed, which is about once a week.  She does state that her chest pain has decreased since starting antibiotics.  She continues to use 2 LPM oxygen during the day and 4 LPM of oxygen at night.  She is currently at a rehab facility.    Exacerbations this year:  0     Current medication regimen:  Advair 100, singulair and albuterol    Oxygen use:   2 LPM on exertion and at night    MMRC Grade: 4- Breathless with ambulating around house or ADLs      Past Medical History:   Diagnosis Date    A-fib ([facility])     MONI (acute kidney injury) ([facility]) 08/09/2023    Asthma     Bipolar 2 disorder ([facility])     Chickenpox     Chronic obstructive pulmonary disease ([facility])     Congestive heart failure ([facility])     Hypertension     On home oxygen therapy     Other psychological stress     Schizophrenic disorder ([facility])     Yeast infection of the skin 04/01/2021     Past Surgical History:   Procedure Laterality Date    ORIF, ANKLE  Left 2024    Procedure: ORIF, ANKLE- TALUS;  Surgeon: Roman Rosa M.D.;  Location: SURGERY [facility];  Service: Orthopedics    CHOLECYSTECTOMY      COLECTOMY      HYSTERECTOMY LAPAROSCOPY      KNEE ARTHROSCOPY Left      Social History     Socioeconomic History    Marital status: Single     Spouse name: Not on file    Number of children: Not on file    Years of education: Not on file    Highest education level: Not on file   Occupational History    Not on file   Tobacco Use    Smoking status: Former     Current packs/day: 0.00     Types: Cigarettes     Quit date: 10/12/2022     Years since quittin.1    Smokeless tobacco: Never   Vaping Use    Vaping status: Never Used   Substance and Sexual Activity    Alcohol use: Not Currently    Drug use: Never    Sexual activity: Not Currently   Other Topics Concern    Not on file   Social History Narrative    ** Merged History Encounter **         From [place]     Social Drivers of Health     Financial Resource Strain: Medium Risk (2022)    Overall Financial Resource Strain (CARDIA)     Difficulty of Paying Living Expenses: Somewhat hard   Food Insecurity: No Food Insecurity (10/31/2024)    Hunger Vital Sign     Worried About Running Out of Food in the Last Year: Never true     Ran Out of Food in the Last Year: Never true   Transportation Needs: No Transportation Needs (10/31/2024)    PRAPARE - Transportation     Lack of Transportation (Medical): No     Lack of Transportation (Non-Medical): No   Physical Activity: Not on file   Stress: Not on file   Social Connections: Not on file   Intimate Partner Violence: Not At Risk (10/31/2024)    Humiliation, Afraid, Rape, and Kick questionnaire     Fear of Current or Ex-Partner: No     Emotionally Abused: No     Physically Abused: No     Sexually Abused: No   Housing Stability: Low Risk  (10/31/2024)    Housing Stability Vital Sign     Unable to Pay for Housing in the Last Year: No     Number of Times Moved in the  Last Year: 1     Homeless in the Last Year: No        Family History   Problem Relation Age of Onset    No Known Problems Mother     Cancer Sister      Current Outpatient Medications on File Prior to Visit   Medication Sig Dispense Refill    azithromycin (ZITHROMAX) 250 MG Tab Take 2 Tablets by mouth every day for 1 day, THEN 1 Tablet every day for 4 days. 6 Tablet 0    apixaban (ELIQUIS) 5mg Tab Take 1 Tablet by mouth 2 times a day. 60 Tablet 5    calcium carbonate (OS-ABELARDO 500) 500 MG Tab Take 1 Tablet by mouth 2 times a day with meals.      cyclobenzaprine (FLEXERIL) 10 mg Tab Take 1 Tablet by mouth 3 times a day as needed for Moderate Pain or Muscle Spasms. 15 Tablet 1    gabapentin (NEURONTIN) 300 MG Cap Take 1 Capsule by mouth 3 times a day.      hydrOXYzine HCl (ATARAX) 25 MG Tab Take 1 Tablet by mouth 3 times a day as needed for Itching.      senna-docusate (PERICOLACE OR SENOKOT S) 8.6-50 MG Tab Take 2 Tablets by mouth every evening.      polyethylene glycol/lytes (MIRALAX) Pack Take 1 Packet by mouth 1 time a day as needed (if no bowel movement in last 2 days).      Vitamin D, Cholecalciferol, (CHOLECALCIFEROL) 25 MCG (1000 UT) Tab Take 2 Tablets by mouth every day. 60 Tablet 0    nicotine (NICODERM) 21 MG/24HR PATCH 24 HR Place 1 Patch on the skin every 24 hours.      ascorbic acid (VITAMIN C) 500 MG tablet Take 500 mg by mouth every day.      ferrous sulfate 325 (65 Fe) MG tablet Take 325 mg by mouth every 48 hours.      ketoconazole (NIZORAL) 2 % Cream Apply 1 Application topically 2 times a day.      acetaminophen (TYLENOL) 325 MG Tab Take 650 mg by mouth every four hours as needed for Moderate Pain.      nicotine (NICODERM) 7 MG/24HR PATCH 24 HR       lidocaine (LIDODERM) 5 % Patch Place 1 Patch on the skin every 12 hours.      nicotine (NICODERM) 14 MG/24HR PATCH 24 HR Place 1 Patch on the skin every 24 hours.      Home Care Oxygen Inhale 2 L/min every evening. DME = Preferred      ADVAIR DISKUS  "100-50 MCG/ACT AEROSOL POWDER, BREATH ACTIVATED Inhale 1 Puff 2 times a day. 180 Each 3    montelukast (SINGULAIR) 10 MG Tab Take 1 Tablet by mouth every evening. 90 Tablet 3    metoprolol SR (TOPROL XL) 25 MG TABLET SR 24 HR Take 25 mg by mouth every day.      fluticasone (FLONASE) 50 MCG/ACT nasal spray Administer 2 Sprays into each nostril every morning.      aripiprazole (ABILIFY) 30 MG tablet Take 1 Tablet by mouth every morning. Indications: Major Depressive Disorder 30 Tablet 0    atorvastatin (LIPITOR) 20 MG Tab Take 1 Tablet by mouth at bedtime. 30 Tablet 0     No current facility-administered medications on file prior to visit.     Allergies: Penicillin g and Amoxicillin    ROS:   Review of Systems   Constitutional:  Negative for chills, diaphoresis, fever and malaise/fatigue.   HENT:  Negative for congestion and sinus pain.    Respiratory:  Positive for shortness of breath. Negative for cough, hemoptysis, sputum production and wheezing.    Cardiovascular:  Negative for chest pain, palpitations and leg swelling.   Gastrointestinal:  Negative for diarrhea, heartburn, nausea and vomiting.   Musculoskeletal:  Negative for falls and myalgias.   Neurological:  Negative for dizziness, weakness and headaches.     Vitals:  /76 (BP Location: Right arm, Patient Position: Sitting, BP Cuff Size: Adult)   Pulse 67   Ht 1.549 m (5' 1\")   Wt 123 kg (272 lb)   SpO2 95%     Physical Exam  Constitutional:       General: She is not in acute distress.     Appearance: Normal appearance. She is not ill-appearing, toxic-appearing or diaphoretic.   Cardiovascular:      Rate and Rhythm: Normal rate and regular rhythm.      Pulses: Normal pulses.      Heart sounds: Normal heart sounds. No murmur heard.     No friction rub. No gallop.   Pulmonary:      Effort: Pulmonary effort is normal. No respiratory distress.      Breath sounds: Normal breath sounds. No stridor. No wheezing, rhonchi or rales.   Musculoskeletal:         " General: No swelling.      Right lower leg: No edema.      Left lower leg: No edema.   Skin:     General: Skin is warm.   Neurological:      General: No focal deficit present.      Mental Status: She is alert and oriented to person, place, and time.   Psychiatric:         Mood and Affect: Mood normal.         Behavior: Behavior normal.         Thought Content: Thought content normal.         Judgment: Judgment normal.         Laboratory Data:  PFTs: (Date: 9/21/2021)-      Impression:  Baseline spirometry shows significant reduction in FVC at 1.45 L or 46% predicted and FEV1 at 1.24 L or 48% predicted with FEV1/FVC ratio of 86 suggesting restriction.  There is significant bronchodilator response with postbronchodilator FEV1 of 1.48 L or 58% predicted.  Total lung capacity is just below lower limits of normal at 3.61 L or 78% predicted.  Diffusion capacity is within normal limits at 85% predicted.  Pulmonary function testing shows mixed restrictive obstructive defect with significant bronchodilator response.  This could be consistent with COPD in a patient with morbid obesity.  Correlate clinically and with imaging    CTA Chest: (Date: 4/7/2024)-  Impression:  1.  Suboptimal exam related to respiratory motion of the segmental branches. No evidence of pulmonary embolism.  2.  Mild cardiomegaly.  3.  Right adrenal calcifications.  4.  3 mm right middle lobe pulmonary nodule    ECHO: (Date: 5/21/2024)-  Impression:  Compared to the prior study on 10/1/2022.   Moderate concentric LVH and   noted elevated RV systolic pressure.   Normal left ventricular chamber size. Moderate concentric left   ventricular hypertrophy. Normal left ventricular systolic function. The   left ventricular ejection fraction is visually estimated to be 60-65%.   Normal regional wall motion. Normal diastolic function.  Mild tricuspid regurgitation with elevated RV systolic pressure of 35   mmHg.      Assessment and Plan:    Problem List Items  Addressed This Visit          Pulmonary/Sleep Medicine Problems    KATHIA (obstructive sleep apnea)     iVAPS machine order placed based on titration results.  Order was sent to [facility].         Asthma-COPD overlap syndrome (HCC)     PFTs in 2021 show a restrictive process FVC of 1.45 L or 46%, FEV1 1.24 L or 48%, FEV1/FVC 86%, %, TLC 78%, and DLCO 85% predicted, with a positive bronchodilator response.  CT chest does not show emphysema.  Patient is a recent former smoker.  She is currently on Advair 100, Singulair, and albuterol as needed.  Patient is currently on azithromycin for CAP.  --Continue Advair 100 and Singulair  --Continue albuterol as needed  --Continue with azithromycin  --6-week follow-up to monitor symptoms  --Repeat PFTs that were ordered last appointment.  --Advised patient to remain up-to-date on all vaccines         Tobacco abuse, in remission     Patient has a 20+ pack year history, quitting a week ago.  She is currently using patches.  She is motivated to continue abstinence.  --Congratulated and encouraged patient to continue abstinence  -- If patient is stable at next appointment, will consider referral to lung cancer screening program.            Other    Pulmonary nodules     CTA chest in April 2024 does not show PE or emphysema but does show a 3 mm RML nodule, which is evident on the CT abdomen pelvis from July 2023.   -- Repeat CT chest in 1 year (4/2025) given the patient is high risk due to her smoking history.         Chronic respiratory failure with hypoxia (HCC)     Patient continues to use benefit from 2 LPM of oxygen during the day and 4 LPM of oxygen at night.          Diagnostic studies have been reviewed with the patient.    Return in about 6 weeks (around 1/17/2025), or if symptoms worsen or fail to improve, for ASTHMA/COPD overlap, with Gianni or anyone .     This note was generated using voice recognition software which has a chance of producing errors of grammar and  possibly content.  I have made every reasonable attempt to find and correct any obvious errors, but it should be expected that some may not be found prior to finalization of this note.    Time spent in record review prior to patient arrival, reviewing results, and in face-to-face encounter totaled 30 min.  __________  HENRIETTA Wu  Pulmonary Medicine  [facility]

## 2024-12-06 NOTE — ED NOTES
Patient provided discharge instructions. Patient verbalized understanding. Patient leaving ER in stable condition.  IV removed. Transfer packet given to [facility].

## 2024-12-06 NOTE — ED NOTES
Multiple attempts to give report to [facility] failed. ED KETTY [place] notified of patient transfer back to SNF.

## 2024-12-06 NOTE — DISCHARGE PLANNING
Late Entry    SW attempted to arrange transport for pt via [facility] but two different representatives with [facility] told this SW that since the pt was transported from [facility] to the ED and was discharged from the ED that [facility] is responsible for the transport back and [facility] will not schedule transport.     SW attempted to call [facility] multiple times and was unable to reach anyone. Bedside RN was also not able to reach any staff at [facility].    SW had to schedule [facility] transport for pt since she needed 4L O2 and gurney transport.   Time: 6380-3254  Reservation: [id number]  Cost: $281.96

## 2024-12-07 NOTE — ASSESSMENT & PLAN NOTE
Patient continues to use benefit from 2 LPM of oxygen during the day and 4 LPM of oxygen at night.

## 2024-12-07 NOTE — ASSESSMENT & PLAN NOTE
PFTs in 2021 show a restrictive process FVC of 1.45 L or 46%, FEV1 1.24 L or 48%, FEV1/FVC 86%, %, TLC 78%, and DLCO 85% predicted, with a positive bronchodilator response.  CT chest does not show emphysema.  Patient is a recent former smoker.  She is currently on Advair 100, Singulair, and albuterol as needed.  Patient is currently on azithromycin for CAP.  --Continue Advair 100 and Singulair  --Continue albuterol as needed  --Continue with azithromycin  --6-week follow-up to monitor symptoms  --Repeat PFTs that were ordered last appointment.  --Advised patient to remain up-to-date on all vaccines

## 2024-12-07 NOTE — ASSESSMENT & PLAN NOTE
Patient has a 20+ pack year history, quitting a week ago.  She is currently using patches.  She is motivated to continue abstinence.  --Congratulated and encouraged patient to continue abstinence  -- If patient is stable at next appointment, will consider referral to lung cancer screening program.

## 2024-12-07 NOTE — ASSESSMENT & PLAN NOTE
CTA chest in April 2024 does not show PE or emphysema but does show a 3 mm RML nodule, which is evident on the CT abdomen pelvis from July 2023.   -- Repeat CT chest in 1 year (4/2025) given the patient is high risk due to her smoking history.

## 2024-12-24 NOTE — ED NOTES
ENDOSCOPY DISCHARGE INSTRUCTIONS    Procedure Performed:   Gastroscopy and Colonoscopy    Endoscopist: No name on file  FINDINGS:   Marin's esophagus (change in the esophagus lining from acid damage), Hiatal hernia (stomach slides up into chest through diaphragm), Diverticulosis (pockets in colon that develop with age and lack of fiber intake), and Colon polyp(s) (a growth in the colon)    MEDICATIONS:  You may resume all other medications today    DIET:  Resume Normal Diet    BIOPSIES:  Biopsies were taken (you will be notified of results in 7-10 days)    X-RAYS/LABS:   No X-rays/Labs were ordered today    ADDITIONAL RECOMMENDATIONS:    - Follow up pathology results  - Suspect that coffee ground emesis last week (now resolved) due to mariely nials tear with adequate healing.    - Continue on Prilosec 40 mg BID at this time  - Given concern for Marin's - will likely need to be on Prilosec regimen long-term  - Follow up with Dr. Chavez  - Repeat Colonoscopy in 5 years given polyp and fair prep.    Activity for remainder of today:    REST TODAY  DO NOT drive or operate heavy machinery  DO NOT drink any alcoholic beverages  DO NOT sign any legal documents or make any important decisions    After your procedure(s):  It is not unusual to feel bloated or gassy .  Passing gas and belching is encouraged. Lying on your left side with your knees flexed may relieve the discomfort. A hot pack to the abdomen may also help.    After your gastroscopy (upper endoscopy): You may experience a slight sore throat which will subside. Throat lozenges or salt water gargle can be used.    FOLLOW-UP:  Contact the office at [phone] for follow-up appointment is needed or if you develop any of the following:    Severe abdominal pain/discomfort     Excessive bleeding                     Black tarry stool    Difficulty breathing/swallowing      Persistent nausea/vomiting  Fever above 100 degrees or chills     Patient updated on POC that she is NPO until after stress test at 1030. Call light within reach.

## 2024-12-31 ENCOUNTER — APPOINTMENT (OUTPATIENT)
Dept: RADIOLOGY | Facility: MEDICAL CENTER | Age: 49
End: 2024-12-31
Attending: EMERGENCY MEDICINE
Payer: MEDICAID

## 2024-12-31 ENCOUNTER — HOSPITAL ENCOUNTER (EMERGENCY)
Facility: MEDICAL CENTER | Age: 49
End: 2024-12-31
Attending: EMERGENCY MEDICINE
Payer: MEDICAID

## 2024-12-31 VITALS
WEIGHT: 271.17 LBS | HEIGHT: 62 IN | DIASTOLIC BLOOD PRESSURE: 65 MMHG | BODY MASS INDEX: 49.9 KG/M2 | SYSTOLIC BLOOD PRESSURE: 138 MMHG | OXYGEN SATURATION: 98 % | TEMPERATURE: 97 F | HEART RATE: 78 BPM | RESPIRATION RATE: 20 BRPM

## 2024-12-31 DIAGNOSIS — R07.9 CHEST PAIN, UNSPECIFIED TYPE: ICD-10-CM

## 2024-12-31 DIAGNOSIS — R06.02 SHORTNESS OF BREATH: ICD-10-CM

## 2024-12-31 LAB
ALBUMIN SERPL BCP-MCNC: 4.2 G/DL (ref 3.2–4.9)
ALBUMIN/GLOB SERPL: 1.3 G/DL
ALP SERPL-CCNC: 132 U/L (ref 30–99)
ALT SERPL-CCNC: 15 U/L (ref 2–50)
ANION GAP SERPL CALC-SCNC: 11 MMOL/L (ref 7–16)
AST SERPL-CCNC: 18 U/L (ref 12–45)
BASOPHILS # BLD AUTO: 0.1 % (ref 0–1.8)
BASOPHILS # BLD: 0.01 K/UL (ref 0–0.12)
BILIRUB SERPL-MCNC: 0.6 MG/DL (ref 0.1–1.5)
BUN SERPL-MCNC: 14 MG/DL (ref 8–22)
CALCIUM ALBUM COR SERPL-MCNC: 9.7 MG/DL (ref 8.5–10.5)
CALCIUM SERPL-MCNC: 9.9 MG/DL (ref 8.5–10.5)
CHLORIDE SERPL-SCNC: 102 MMOL/L (ref 96–112)
CO2 SERPL-SCNC: 28 MMOL/L (ref 20–33)
CREAT SERPL-MCNC: 1.11 MG/DL (ref 0.5–1.4)
EKG IMPRESSION: NORMAL
EOSINOPHIL # BLD AUTO: 0.17 K/UL (ref 0–0.51)
EOSINOPHIL NFR BLD: 1.9 % (ref 0–6.9)
ERYTHROCYTE [DISTWIDTH] IN BLOOD BY AUTOMATED COUNT: 46.6 FL (ref 35.9–50)
FLUAV RNA SPEC QL NAA+PROBE: NEGATIVE
FLUBV RNA SPEC QL NAA+PROBE: NEGATIVE
GFR SERPLBLD CREATININE-BSD FMLA CKD-EPI: 60 ML/MIN/1.73 M 2
GLOBULIN SER CALC-MCNC: 3.2 G/DL (ref 1.9–3.5)
GLUCOSE SERPL-MCNC: 85 MG/DL (ref 65–99)
HCT VFR BLD AUTO: 35.3 % (ref 37–47)
HGB BLD-MCNC: 11.1 G/DL (ref 12–16)
IMM GRANULOCYTES # BLD AUTO: 0.03 K/UL (ref 0–0.11)
IMM GRANULOCYTES NFR BLD AUTO: 0.3 % (ref 0–0.9)
LYMPHOCYTES # BLD AUTO: 1.52 K/UL (ref 1–4.8)
LYMPHOCYTES NFR BLD: 16.8 % (ref 22–41)
MCH RBC QN AUTO: 26.8 PG (ref 27–33)
MCHC RBC AUTO-ENTMCNC: 31.4 G/DL (ref 32.2–35.5)
MCV RBC AUTO: 85.3 FL (ref 81.4–97.8)
MONOCYTES # BLD AUTO: 0.69 K/UL (ref 0–0.85)
MONOCYTES NFR BLD AUTO: 7.6 % (ref 0–13.4)
NEUTROPHILS # BLD AUTO: 6.65 K/UL (ref 1.82–7.42)
NEUTROPHILS NFR BLD: 73.3 % (ref 44–72)
NRBC # BLD AUTO: 0 K/UL
NRBC BLD-RTO: 0 /100 WBC (ref 0–0.2)
NT-PROBNP SERPL IA-MCNC: 143 PG/ML (ref 0–125)
PLATELET # BLD AUTO: 234 K/UL (ref 164–446)
PMV BLD AUTO: 10.6 FL (ref 9–12.9)
POTASSIUM SERPL-SCNC: 4.6 MMOL/L (ref 3.6–5.5)
PROT SERPL-MCNC: 7.4 G/DL (ref 6–8.2)
RBC # BLD AUTO: 4.14 M/UL (ref 4.2–5.4)
RSV RNA SPEC QL NAA+PROBE: NEGATIVE
SARS-COV-2 RNA RESP QL NAA+PROBE: NOTDETECTED
SODIUM SERPL-SCNC: 141 MMOL/L (ref 135–145)
TROPONIN T SERPL-MCNC: 26 NG/L (ref 6–19)
TROPONIN T SERPL-MCNC: 26 NG/L (ref 6–19)
WBC # BLD AUTO: 9.1 K/UL (ref 4.8–10.8)

## 2024-12-31 PROCEDURE — 84484 ASSAY OF TROPONIN QUANT: CPT

## 2024-12-31 PROCEDURE — 93005 ELECTROCARDIOGRAM TRACING: CPT | Mod: TC | Performed by: EMERGENCY MEDICINE

## 2024-12-31 PROCEDURE — 0241U HCHG SARS-COV-2 COVID-19 NFCT DS RESP RNA 4 TRGT ED POC: CPT

## 2024-12-31 PROCEDURE — 71045 X-RAY EXAM CHEST 1 VIEW: CPT

## 2024-12-31 PROCEDURE — 80053 COMPREHEN METABOLIC PANEL: CPT

## 2024-12-31 PROCEDURE — 99285 EMERGENCY DEPT VISIT HI MDM: CPT

## 2024-12-31 PROCEDURE — 83880 ASSAY OF NATRIURETIC PEPTIDE: CPT

## 2024-12-31 PROCEDURE — 93005 ELECTROCARDIOGRAM TRACING: CPT | Mod: TC

## 2024-12-31 PROCEDURE — 36415 COLL VENOUS BLD VENIPUNCTURE: CPT

## 2024-12-31 PROCEDURE — 85025 COMPLETE CBC W/AUTO DIFF WBC: CPT

## 2024-12-31 ASSESSMENT — FIBROSIS 4 INDEX: FIB4 SCORE: 1.11

## 2024-12-31 NOTE — ED NOTES
Chief Complaint   Patient presents with    Chest Pain     x2days    Nausea     Pt bib ems from [facility] here for chest pain x2days. Pt uses 3L nc baseline.   Pt then c/o sore throat feels like she has the flu, poc viral swab collected.   Blood sent to lab  Pt to EKG room

## 2025-01-01 NOTE — ED PROVIDER NOTES
ER Provider Note    Scribed for Lazara Lee M.d. by Arabella Donald. 12/31/2024  4:27 PM    Primary Care Provider: Lili Reddy M.D.    CHIEF COMPLAINT  Chief Complaint   Patient presents with    Chest Pain     x2days    Nausea     LIMITATION TO HISTORY   Select: : None    HPI/ROS  OUTSIDE HISTORIAN(S):  None    EXTERNAL RECORDS REVIEWED  Outpatient Notes Patient was seen by cardiology on December 2nd. History of A-fib and heart failure. Troponin at baseline and viral swab was negative.    Lorene Haro is a 49 y.o. female who presents to the ED via EMS from [facility] for chest pain onset yesterday. Patient reports she is also experiencing a sore throat and nausea. She denies any dysuria. Patient reports she feels like she has the flu. Patient reports she is normally on 3L of oxygen.     PAST MEDICAL HISTORY  Past Medical History:   Diagnosis Date    A-fib (HCC)     MONI (acute kidney injury) (HCC) 08/09/2023    Asthma     Bipolar 2 disorder (HCC)     Chickenpox     Chronic obstructive pulmonary disease (HCC)     Congestive heart failure (HCC)     Hypertension     On home oxygen therapy     Other psychological stress     Schizophrenic disorder (HCC)     Yeast infection of the skin 04/01/2021       SURGICAL HISTORY  Past Surgical History:   Procedure Laterality Date    ORIF, ANKLE Left 11/2/2024    Procedure: ORIF, ANKLE- TALUS;  Surgeon: Roman Rosa M.D.;  Location: SURGERY [facility];  Service: Orthopedics    CHOLECYSTECTOMY      COLECTOMY      HYSTERECTOMY LAPAROSCOPY      KNEE ARTHROSCOPY Left        FAMILY HISTORY  Family History   Problem Relation Age of Onset    No Known Problems Mother     Cancer Sister        SOCIAL HISTORY   reports that she quit smoking about 2 years ago. Her smoking use included cigarettes. She has never used smokeless tobacco. She reports that she does not currently use alcohol. She reports that she does not use drugs.    CURRENT MEDICATIONS  Previous Medications     ACETAMINOPHEN (TYLENOL) 325 MG TAB    Take 650 mg by mouth every four hours as needed for Moderate Pain.    ADVAIR DISKUS 100-50 MCG/ACT AEROSOL POWDER, BREATH ACTIVATED    Inhale 1 Puff 2 times a day.    APIXABAN (ELIQUIS) 5MG TAB    Take 1 Tablet by mouth 2 times a day.    ARIPIPRAZOLE (ABILIFY) 30 MG TABLET    Take 1 Tablet by mouth every morning. Indications: Major Depressive Disorder    ASCORBIC ACID (VITAMIN C) 500 MG TABLET    Take 500 mg by mouth every day.    ATORVASTATIN (LIPITOR) 20 MG TAB    Take 1 Tablet by mouth at bedtime.    CALCIUM CARBONATE (OS-ABELARDO 500) 500 MG TAB    Take 1 Tablet by mouth 2 times a day with meals.    CYCLOBENZAPRINE (FLEXERIL) 10 MG TAB    Take 1 Tablet by mouth 3 times a day as needed for Moderate Pain or Muscle Spasms.    FERROUS SULFATE 325 (65 FE) MG TABLET    Take 325 mg by mouth every 48 hours.    FLUTICASONE (FLONASE) 50 MCG/ACT NASAL SPRAY    Administer 2 Sprays into each nostril every morning.    GABAPENTIN (NEURONTIN) 300 MG CAP    Take 1 Capsule by mouth 3 times a day.    HOME CARE OXYGEN    Inhale 2 L/min every evening. DME = Preferred    HYDROXYZINE HCL (ATARAX) 25 MG TAB    Take 1 Tablet by mouth 3 times a day as needed for Itching.    KETOCONAZOLE (NIZORAL) 2 % CREAM    Apply 1 Application topically 2 times a day.    LIDOCAINE (LIDODERM) 5 % PATCH    Place 1 Patch on the skin every 12 hours.    METOPROLOL SR (TOPROL XL) 25 MG TABLET SR 24 HR    Take 25 mg by mouth every day.    MONTELUKAST (SINGULAIR) 10 MG TAB    Take 1 Tablet by mouth every evening.    NICOTINE (NICODERM) 14 MG/24HR PATCH 24 HR    Place 1 Patch on the skin every 24 hours.    NICOTINE (NICODERM) 21 MG/24HR PATCH 24 HR    Place 1 Patch on the skin every 24 hours.    NICOTINE (NICODERM) 7 MG/24HR PATCH 24 HR        POLYETHYLENE GLYCOL/LYTES (MIRALAX) PACK    Take 1 Packet by mouth 1 time a day as needed (if no bowel movement in last 2 days).    SENNA-DOCUSATE (PERICOLACE OR SENOKOT S) 8.6-50 MG TAB    " Take 2 Tablets by mouth every evening.    VITAMIN D, CHOLECALCIFEROL, (CHOLECALCIFEROL) 25 MCG (1000 UT) TAB    Take 2 Tablets by mouth every day.       ALLERGIES  Amoxicillin and Penicillin g    PHYSICAL EXAM  BP (!) 168/74   Pulse 78   Temp 36.1 °C (97 °F) (Temporal)   Resp 16   Ht 1.575 m (5' 2\")   Wt 123 kg (271 lb 2.7 oz)   LMP  (LMP Unknown)   SpO2 99%   BMI 49.60 kg/m²     Constitutional: Morbidly obese, Chronically ill appearing, Extremely deconditioned, Alert in no severe respiratory distress.  HENT: No signs of trauma, Bilateral external ears normal, Nose normal.   Eyes: Pupils are equal and reactive, Conjunctiva normal, Non-icteric.   Neck: No stridor.   Cardiovascular: Regular rate and rhythm, no murmurs.   Thorax & Lungs: Normal breath sounds, No severe respiratory distress, No wheezing, No chest tenderness.   Abdomen: Bowel sounds normal, Soft, No tenderness, No masses, No peritoneal signs.  Skin: Warm, Dry, No erythema, No rash, No obvious cellulitis of lower extremities.   Musculoskeletal:  Bilateral chronic lower extremity edema.  Neurologic: Alert, moving all extremities without difficulty, no focal deficits.    DIAGNOSTIC STUDIES & PROCEDURES    Labs:   Results for orders placed or performed during the hospital encounter of 12/31/24   CBC with Differential    Collection Time: 12/31/24  2:40 PM   Result Value Ref Range    WBC 9.1 4.8 - 10.8 K/uL    RBC 4.14 (L) 4.20 - 5.40 M/uL    Hemoglobin 11.1 (L) 12.0 - 16.0 g/dL    Hematocrit 35.3 (L) 37.0 - 47.0 %    MCV 85.3 81.4 - 97.8 fL    MCH 26.8 (L) 27.0 - 33.0 pg    MCHC 31.4 (L) 32.2 - 35.5 g/dL    RDW 46.6 35.9 - 50.0 fL    Platelet Count 234 164 - 446 K/uL    MPV 10.6 9.0 - 12.9 fL    Neutrophils-Polys 73.30 (H) 44.00 - 72.00 %    Lymphocytes 16.80 (L) 22.00 - 41.00 %    Monocytes 7.60 0.00 - 13.40 %    Eosinophils 1.90 0.00 - 6.90 %    Basophils 0.10 0.00 - 1.80 %    Immature Granulocytes 0.30 0.00 - 0.90 %    Nucleated RBC 0.00 0.00 - " 0.20 /100 WBC    Neutrophils (Absolute) 6.65 1.82 - 7.42 K/uL    Lymphs (Absolute) 1.52 1.00 - 4.80 K/uL    Monos (Absolute) 0.69 0.00 - 0.85 K/uL    Eos (Absolute) 0.17 0.00 - 0.51 K/uL    Baso (Absolute) 0.01 0.00 - 0.12 K/uL    Immature Granulocytes (abs) 0.03 0.00 - 0.11 K/uL    NRBC (Absolute) 0.00 K/uL   Complete Metabolic Panel (CMP)    Collection Time: 24  2:40 PM   Result Value Ref Range    Sodium 141 135 - 145 mmol/L    Potassium 4.6 3.6 - 5.5 mmol/L    Chloride 102 96 - 112 mmol/L    Co2 28 20 - 33 mmol/L    Anion Gap 11.0 7.0 - 16.0    Glucose 85 65 - 99 mg/dL    Bun 14 8 - 22 mg/dL    Creatinine 1.11 0.50 - 1.40 mg/dL    Calcium 9.9 8.5 - 10.5 mg/dL    Correct Calcium 9.7 8.5 - 10.5 mg/dL    AST(SGOT) 18 12 - 45 U/L    ALT(SGPT) 15 2 - 50 U/L    Alkaline Phosphatase 132 (H) 30 - 99 U/L    Total Bilirubin 0.6 0.1 - 1.5 mg/dL    Albumin 4.2 3.2 - 4.9 g/dL    Total Protein 7.4 6.0 - 8.2 g/dL    Globulin 3.2 1.9 - 3.5 g/dL    A-G Ratio 1.3 g/dL   proBrain Natriuretic Peptide, NT (BNP)    Collection Time: 24  2:40 PM   Result Value Ref Range    NT-proBNP 143 (H) 0 - 125 pg/mL   Troponins NOW    Collection Time: 24  2:40 PM   Result Value Ref Range    Troponin T 26 (H) 6 - 19 ng/L   ESTIMATED GFR    Collection Time: 24  2:40 PM   Result Value Ref Range    GFR (CKD-EPI) 60 >60 mL/min/1.73 m 2   POC CoV-2, FLU A/B, RSV by PCR    Collection Time: 24  2:45 PM   Result Value Ref Range    POC Influenza A RNA, PCR Negative Negative    POC Influenza B RNA, PCR Negative Negative    POC RSV, by PCR Negative Negative    POC SARS-CoV-2, PCR NotDetected NotDetected   EKG    Collection Time: 24  4:43 PM   Result Value Ref Range    Report       [facility]    Test Date:  2024  Pt Name:    ADELINA MILLAN                Department: ER  MRN:        [id number]                      Room:  Gender:     Female                       Technician: [id number]  :         [id number]                   Requested By:ER TRIAGE PROTOCOL  Order #:    [id number]                    Reading MD: KASSY ELIZONDO    Measurements  Intervals                                Axis  Rate:       72                           P:          26  AR:         138                          QRS:        64  QRSD:       111                          T:          31  QT:         377  QTc:        413    Interpretive Statements  Sinus rhythm  Low voltage, precordial leads  RSR' in V1 or V2, right VCD or RVH  Compared to ECG 12/05/2024 22:59:38  Low QRS voltage now present  Right ventricular hypertrophy now present  RSR' in V1 or V2 now present  Right bundle-branch block no longer present  Impression: Sinus rhythm no evidence of ischemia.  Electron ically Signed On 12- 16:43:02 PST by KASSY ELIZONDO     Troponins in two (2) hours    Collection Time: 12/31/24  4:54 PM   Result Value Ref Range    Troponin T 26 (H) 6 - 19 ng/L     *Note: Due to a large number of results and/or encounters for the requested time period, some results have not been displayed. A complete set of results can be found in Results Review.     All labs reviewed by me.    EKG:   I have independently interpreted this EKG as seen above.    Radiology:   The attending Emergency Physician has independently interpreted the diagnostic imaging associated with this visit and is awaiting the final reading from the radiologist, which will be displayed below.    Preliminary interpretation is a follows: Chest x-ray is normal.  Radiologist interpretation:   DX-CHEST-PORTABLE (1 VIEW)   Final Result      No acute cardiac or pulmonary abnormalities are identified.           COURSE & MEDICAL DECISION MAKING    ED Observation Status? No; Patient does not meet criteria for ED Observation.     4:27 PM - Patient seen and evaluated at bedside. Ordered troponin, CMP, CBC with diff, EKG, DX-chest to evaluate. She understands and agrees to the plan of care. Differential  diagnoses include but are not limited to: Viral illness, pneumonia, heart failure, ACS. At this time, I discussed diagnostic imaging and lab work results which show no change in troponin levels, negative for viral infection, and no acute cardiac or pulmonary abnormalities. I discussed discharge and follow up instructions with the patient. Patient understands and agrees to discharge instructions.     INITIAL ASSESSMENT AND PLAN  Care Narrative: This is a 49-year-old female that presents with concern of becoming sick she says she intermittently loses her voice.  She does not have any obvious tonsillitis on exam.  She is afebrile she is on her baseline oxygen chest x-ray does not show any pneumonia.  Labs showed minimally elevated troponin at 26 which appears to be her baseline in reviewing her prior records.  Flu COVID and RSV are negative.  CBC shows a normal white count minimal left shift.  BNP is unremarkable therefore I do not think she has any evidence of heart failure.  At this point given she is on her normal baseline oxygen has no obvious pneumonia or source of infection no evidence of heart failure I do think she can be discharged back to her care facility.  She is incredibly deconditioned she may just be getting out of breath from simple movements I do think she needs more intense PT.  I do think she can be discharged.                 DISPOSITION AND DISCUSSIONS  I have discussed management of the patient with the following physicians and JAY's: None    Discussion of management with other QHP or appropriate source(s): None     Escalation of care considered, and ultimately not performed: acute inpatient care management, however at this time, the patient is most appropriate for outpatient management.    Barriers to care at this time, including but not limited to:  None .     Decision tools and prescription drugs considered including, but not limited to:  none .    The patient will return for new or worsening  symptoms and is stable at the time of discharge. Patient was given return precautions. Patient and/or family member verbalizes understanding and will comply.    DISPOSITION:  Patient will be discharged home in stable condition.    FOLLOW UP:  Lili Reddy M.D.  [address]  [address]  [facility] [address]  [phone]          [facility], Emergency Dept  [address] [facility] [id number] [phone]    Return for worsening shortness of breath, fevers or other concerns      OUTPATIENT MEDICATIONS:  New Prescriptions    No medications on file       FINAL IMPRESSION   1. Chest pain, unspecified type    2. Shortness of breath        Arabella HARRIS (Ade), am scribing for, and in the presence of, Lazara Lee M.D..    Electronically signed by: Arabella Donald (Scribraul), 12/31/2024    ILazara M.D. personally performed the services described in this documentation, as scribed by Arabella Donald in my presence, and it is both accurate and complete.    The note accurately reflects work and decisions made by me.  Lazara Lee M.D.  12/31/2024  6:11 PM

## 2025-01-01 NOTE — ED NOTES
Pt  was discharged back to [place] with [facility] in stable condition with all personal belongings, pt verbalized understanding of discharged paperwork. Pt out of ER via wheelchair.

## 2025-01-01 NOTE — ED NOTES
Pt discharged to [place] by [facility]. Pt was given follow up instructions. All lines removed. Pt verbalized understanding of all instructions for discharge and is assisted out of ED by wheelchair. AOx4

## 2025-01-07 DIAGNOSIS — I48.0 PAROXYSMAL ATRIAL FIBRILLATION (HCC): ICD-10-CM

## 2025-01-14 NOTE — ED TRIAGE NOTES
"Chief Complaint   Patient presents with    Fatigue     Pt BIBA from [facility] for fatigue. Pt was diagnosed with PNA a few days ago and was discharged with antibiotics and prednisone. Pt reports increased fatigue and feeling \"unwell\". Pt doesn't believe she was given antibiotics.          Pt ambulated with walker  to triage for above complaint.    Pt needs meds to beds delivered to ensure she gets antibiotics.   Pt is AO x 4, follows commands, and responds appropriately to questions. Patient's breathing is unlabored and pain is currently 0/10 on the 0-10 pain scale.  Pt placed in lobby. Patient educated on triage process and encouraged to alert staff for any changes.      /69   Pulse 61   Temp 37.1 °C (98.7 °F) (Temporal)   Resp 18   Ht 1.549 m (5' 1\")   Wt 124 kg (274 lb 4 oz)   SpO2 96%     " BMI: BMI (kg/m2): 29.6 (01-14-25 @ 20:48)  HbA1c:   Glucose:   BP: --Vital Signs Last 24 Hrs  T(C): 37.1 (01-14-25 @ 20:48), Max: 37.3 (01-14-25 @ 10:47)  T(F): 98.8 (01-14-25 @ 20:48), Max: 99.1 (01-14-25 @ 10:47)  HR: 81 (01-14-25 @ 19:30) (69 - 116)  BP: 131/85 (01-14-25 @ 19:30) (113/62 - 176/101)  BP(mean): --  RR: 18 (01-14-25 @ 19:30) (15 - 22)  SpO2: 100% (01-14-25 @ 19:30) (95% - 100%)    Orthostatic VS  01-14-25 @ 20:48  Lying BP: --/-- HR: --  Sitting BP: 138/86 HR: 68  Standing BP: 133/89 HR: 77  Site: --  Mode: --    Lipid Panel:

## 2025-01-16 ENCOUNTER — TELEPHONE (OUTPATIENT)
Dept: CARDIOLOGY | Facility: MEDICAL CENTER | Age: 50
End: 2025-01-16
Payer: MEDICAID

## 2025-01-16 ENCOUNTER — NON-PROVIDER VISIT (OUTPATIENT)
Dept: CARDIOLOGY | Facility: MEDICAL CENTER | Age: 50
End: 2025-01-16
Payer: MEDICAID

## 2025-01-16 DIAGNOSIS — I47.29 NSVT (NONSUSTAINED VENTRICULAR TACHYCARDIA) (HCC): ICD-10-CM

## 2025-01-16 PROCEDURE — 93298 REM INTERROG DEV EVAL SCRMS: CPT | Mod: 26 | Performed by: INTERNAL MEDICINE

## 2025-01-16 RX ORDER — METOPROLOL SUCCINATE 50 MG/1
50 TABLET, EXTENDED RELEASE ORAL DAILY
Qty: 90 TABLET | Refills: 3 | Status: SHIPPED | OUTPATIENT
Start: 2025-01-16

## 2025-01-16 NOTE — TELEPHONE ENCOUNTER
Patient returned this RN's call. Advised ot increase metoprolol form 25 to 50 MG daily and that new prescription is at the pharmacy. Patient verbalizes understanding.

## 2025-01-16 NOTE — TELEPHONE ENCOUNTER
FYI - remote transmission received via home monitor, device shows 2 NSVT episodes in December, details below, full report scanned into Media.      NSVT Episode  -Onset: 12/17/2024 @ 9:49 PM  -Duration: 10 seconds  -Average V Rate: 194 bpm    NSVT Episode  -Onset: 12/3/2024 @ 6:54 PM  -Duration: 14 seocnds  -Average V Rate: 200 bpm

## 2025-01-16 NOTE — TELEPHONE ENCOUNTER
Oz Borrego M.D.  You1 minute ago (2:29 PM)       Increased her metoprolol to 50mg. Thanks Madeleine!       Phone Number Called: [phone]    Call outcome: Left detailed message for patient. Informed to call back with any additional questions.    Message: Called to inform patient of VR recommendations

## 2025-01-16 NOTE — TELEPHONE ENCOUNTER
Phone Number Called: [phone]    Call outcome: Spoke to patient regarding message below.    Message: Called to inform patient of device transmission. She does not recall any symptoms at those times. Chart review does show she had pneumonia around the 12/3/24 run.     To VR: Any changes to current POC?

## 2025-02-16 ENCOUNTER — NON-PROVIDER VISIT (OUTPATIENT)
Dept: CARDIOLOGY | Facility: MEDICAL CENTER | Age: 50
End: 2025-02-16
Payer: MEDICAID

## 2025-02-16 ENCOUNTER — HOSPITAL ENCOUNTER (EMERGENCY)
Facility: MEDICAL CENTER | Age: 50
End: 2025-02-16
Attending: EMERGENCY MEDICINE
Payer: MEDICAID

## 2025-02-16 ENCOUNTER — APPOINTMENT (OUTPATIENT)
Dept: RADIOLOGY | Facility: MEDICAL CENTER | Age: 50
End: 2025-02-16
Attending: EMERGENCY MEDICINE
Payer: MEDICAID

## 2025-02-16 VITALS
TEMPERATURE: 98 F | WEIGHT: 261 LBS | BODY MASS INDEX: 47.74 KG/M2 | SYSTOLIC BLOOD PRESSURE: 117 MMHG | HEART RATE: 65 BPM | RESPIRATION RATE: 16 BRPM | OXYGEN SATURATION: 92 % | DIASTOLIC BLOOD PRESSURE: 66 MMHG

## 2025-02-16 DIAGNOSIS — M25.572 ACUTE LEFT ANKLE PAIN: ICD-10-CM

## 2025-02-16 DIAGNOSIS — S93.402A SPRAIN OF LEFT ANKLE, UNSPECIFIED LIGAMENT, INITIAL ENCOUNTER: Primary | ICD-10-CM

## 2025-02-16 PROCEDURE — 73610 X-RAY EXAM OF ANKLE: CPT | Mod: LT

## 2025-02-16 PROCEDURE — 99284 EMERGENCY DEPT VISIT MOD MDM: CPT

## 2025-02-16 RX ORDER — ACETAMINOPHEN 500 MG
1000 TABLET ORAL EVERY 6 HOURS PRN
Qty: 20 TABLET | Refills: 0 | Status: SHIPPED | OUTPATIENT
Start: 2025-02-16 | End: 2025-02-20

## 2025-02-16 ASSESSMENT — PAIN DESCRIPTION - DESCRIPTORS: DESCRIPTORS: MISERABLE

## 2025-02-16 ASSESSMENT — FIBROSIS 4 INDEX: FIB4 SCORE: 1.01293410593117057

## 2025-02-17 NOTE — ED TRIAGE NOTES
Pt bib ems c/o left leg pain x 2 days pt states she has had cellulitis in that leg before and thinks maybe it has returned. Pt pants are too tight to role up to see leg. Pt states she also had a fall last week and may have injured that leg. Pt denies fever. Pt states she is having difficulty walking as well.

## 2025-02-17 NOTE — ED PROVIDER NOTES
ED Provider Note    Scribed for Nader Wilson by Sheree Stafford. 2/16/2025  8:03 PM    Primary care provider: Lili Reddy M.D.  Means of arrival: EMS  History obtained from: Patient  History limited by: None    CHIEF COMPLAINT  Chief Complaint   Patient presents with    Leg Pain     EXTERNAL RECORDS REVIEWED  Outpatient Notes Patient was seen at the [facility] 1/10/2025 for fracture of the left talus.    HPI/ROS  LIMITATION TO HISTORY   Select: : None  OUTSIDE HISTORIAN(S):  None    HPI  Lorene Haro is a 49 y.o. female who presents to the Emergency Department via EMS for left leg pain onset two days ago. Patient reports that she has had cellulitis on this leg before and believes it has returned, as the pain feels similar. She reports that she has also had a recent fall, tripping on the carpet, about a week ago, which could have injured this leg. She adds history of hardware placement to her left ankle and she is concerned this may also be a cause. She notes she believes her left lower leg is swollen. She denies any fever. She reports having difficulty walking as well. She adds trying Tylenol around 4 PM, and the pain was relieved, but then returned. There are no known exacerbating factors. She reports taking Eliquis for A-fib regularly. She reports living in a group home, with people who can assist her while walking.     REVIEW OF SYSTEMS  As above, all other systems reviewed and are negative.   See HPI for further details.     PAST MEDICAL HISTORY   has a past medical history of A-fib ([facility]), MONI (acute kidney injury) ([facility]) (08/09/2023), Asthma, Bipolar 2 disorder ([facility]), Chickenpox, Chronic obstructive pulmonary disease ([facility]), Congestive heart failure ([facility]), Hypertension, On home oxygen therapy, Other psychological stress, Schizophrenic disorder ([facility]), and Yeast infection of the skin (04/01/2021).  SURGICAL HISTORY   has a past surgical history that includes hysterectomy laparoscopy; colectomy; cholecystectomy;  knee arthroscopy (Left); and orif, ankle (Left, 2024).  SOCIAL HISTORY  Social History     Tobacco Use    Smoking status: Former     Current packs/day: 0.00     Types: Cigarettes     Quit date: 10/12/2022     Years since quittin.3    Smokeless tobacco: Never   Vaping Use    Vaping status: Never Used   Substance Use Topics    Alcohol use: Not Currently    Drug use: Never      Social History     Substance and Sexual Activity   Drug Use Never     FAMILY HISTORY  Family History   Problem Relation Age of Onset    No Known Problems Mother     Cancer Sister      CURRENT MEDICATIONS  Home Medications    **Home medications have not yet been reviewed for this encounter**       Audit from Redirected Encounters    **Home medications have not yet been reviewed for this encounter**       ALLERGIES  Allergies   Allergen Reactions    Amoxicillin Rash and Itching     Tolerates cephalosporins, pt reports that she gets a rash all over her body and gets itchy    Penicillin G Rash and Itching     pt reports that she gets a rash all over her body and gets itchy       PHYSICAL EXAM    VITAL SIGNS:   Vitals:    25 1825 25 1845 25 1940 25   BP: 116/71  132/63 120/57   Pulse: 68  69 64   Resp: 18      Temp: 36.8 °C (98.2 °F)      TempSrc: Temporal      SpO2: 94%  95% 95%   Weight:  118 kg (261 lb)       Vitals: My interpretation: normotensive, not tachycardic, afebrile, not hypoxic    Reinterpretation of vitals: Unchanged and unremarkable.      PE:   Gen: sitting comfortably, speaking clearly, appears in no acute distress   ENT: Mucous membranes moist, posterior pharynx clear, uvula midline, nares patent bilaterally   Neck: Supple, FROM  Pulmonary: Lungs are clear to auscultation bilaterally. No tachypnea  CV:  RRR, no murmur appreciated, pulses 2+ in both upper and lower extremities  Abdomen: soft, NT/ND; no rebound/guarding  Extremities: Left ankle has some subjective tenderness when palpating but no  edema or swelling, no signs of traumatic injury, no skin discoloration or ecchymosis.  Posterior tibialis and pedal pulse are intact.  Normal capillary refill.  Weightbearing at baseline with her walker.  : no CVA or suprapubic tenderness   Neuro: A&Ox4 (person, place, time, situation), speech fluent, gait steady, no focal deficits appreciated  Skin: No rash or lesions.  No pallor or jaundice.  No cyanosis.  Warm and dry.     DIAGNOSTIC STUDIES     RADIOLOGY  I have independently interpreted the diagnostic imaging associated with this visit and am waiting the final reading from the radiologist.   My preliminary interpretation is a follows: No obvious fracture or dislocation on my independent interpretation  Radiologist interpretation is as follows:  DX-ANKLE 3+ VIEWS LEFT   Final Result         1.  No acute traumatic bony injury.   2.  Pes planus           COURSE & MEDICAL DECISION MAKING  Nursing notes, VS, PMSFHx, labs, imaging, EKG reviewed in chart.    ED Observation Status? No; Patient does not meet criteria for ED Observation.     Ddx: sprain, strain, fracture, dislocation    MDM: 8:03 PM Lorene Haro is a 49 y.o. female who presented with evaluation of acute on chronic left ankle pain.  Patient reports having surgery with 2 screws placed in October and has had intermittent chronic pain since the left ankle.  She states she did trip over a blanket a few days ago but details are nonspecific and patient denies any acute traumatic injury or swelling after the event but pain has been increasing.  Possibly arthritic pain versus traumatic injury.  Patient already ambulates with a walker at baseline.  Upon arrival here her vital signs are normal.  Left ankle has some subjective tenderness when palpating but no edema or swelling, no signs of traumatic injury, no skin discoloration or ecchymosis.  Posterior tibialis and pedal pulse are intact.  Normal capillary refill.  Weightbearing at baseline with her walker.   Considering history of trauma, will order x-ray imaging of the ankle which in my independent interpretation was unremarkable for fracture dislocation, radiologist agrees.  Overall this time no indication for splinting there is no signs of traumatic injury and pain could likely be due to postsurgical arthritis.  Recommend Tylenol 3 times a day 1 g and close follow-up with outpatient team.  Patient verbalized understanding strict return precautions outpatient follow plan and is amenable.    ADDITIONAL PROBLEM LIST AND DISPOSITION    I have discussed management of the patient with the following physicians and JAY's:  None    Discussion of management with other QHP or appropriate source(s): None     Escalation of care considered, and ultimately not performed:acute inpatient care management, however at this time, the patient is most appropriate for outpatient management    Barriers to care at this time, including but not limited to:  None .     Decision tools and prescription drugs considered including, but not limited to: Pain Medications Tylenol and Motrin .    FINAL IMPRESSION  1. Sprain of left ankle, unspecified ligament, initial encounter Acute   2. Acute left ankle pain Acute      Sheree HARRIS (Ade), am scribing for, and in the presence of, Nader Wilson.    Electronically signed by: Sheree Stafford (Kevonibe), 2/16/2025    INader personally performed the services described in this documentation, as scribed by Sheree Stafford in my presence, and it is both accurate and complete.    The note accurately reflects work and decisions made by me.  Nader Wilson  2/16/2025  8:44 PM

## 2025-02-17 NOTE — DISCHARGE INSTRUCTIONS
Thankfully your x-ray is negative for any abnormalities.  At this time I would recommend taking a gram of Tylenol once in the morning afternoon and evening to help with pain and ice the ankle.  It is possible that you having postsurgical arthritis which can often occur when weather changes or you have overuse with walking.  At this time is no indication for splinting your ankle this could only cause potential harm at this time.  Continues your walker as needed.  Follow-up with your outpatient PCP for further evaluation and treatment.  Thank you for coming in today.

## 2025-02-17 NOTE — DISCHARGE PLANNING
Medical Social Work    MSW received a voalte message from bedside RN that pt is in need of a ride home.  MSW is familiar with pt and pt has [employer] for ride.  RN updated via voalte.

## 2025-02-17 NOTE — ED NOTES
Pt signed AVS. Pt BIB EMS, will need cab ride home. Pt states she has no one to pick her up. Verified address with patient, no stairs to climb, pt says there are people home to let her inside, but they can't come pick her up from the ED.

## 2025-02-17 NOTE — ED NOTES
Pt discharged home. pt in possession of belongings. Pt provided discharge education and information pertaining to medications and follow up appointments. Pt received copy of discharge instructions and verbalized understanding. /66   Pulse 65   Temp 36.7 °C (98 °F) (Temporal)   Resp 16   Wt 118 kg (261 lb)   LMP  (LMP Unknown)   SpO2 92%   BMI 47.74 kg/m²

## 2025-02-18 PROCEDURE — 93298 REM INTERROG DEV EVAL SCRMS: CPT | Mod: 26 | Performed by: INTERNAL MEDICINE

## 2025-02-23 ENCOUNTER — APPOINTMENT (OUTPATIENT)
Dept: RADIOLOGY | Facility: MEDICAL CENTER | Age: 50
End: 2025-02-23
Attending: EMERGENCY MEDICINE
Payer: MEDICAID

## 2025-02-23 ENCOUNTER — HOSPITAL ENCOUNTER (EMERGENCY)
Facility: MEDICAL CENTER | Age: 50
End: 2025-02-23
Attending: EMERGENCY MEDICINE
Payer: MEDICAID

## 2025-02-23 VITALS
OXYGEN SATURATION: 91 % | TEMPERATURE: 98 F | RESPIRATION RATE: 24 BRPM | SYSTOLIC BLOOD PRESSURE: 121 MMHG | HEART RATE: 67 BPM | DIASTOLIC BLOOD PRESSURE: 60 MMHG

## 2025-02-23 DIAGNOSIS — R07.9 ACUTE CHEST PAIN: ICD-10-CM

## 2025-02-23 DIAGNOSIS — R11.0 NAUSEA: ICD-10-CM

## 2025-02-23 LAB
ALBUMIN SERPL BCP-MCNC: 4 G/DL (ref 3.2–4.9)
ALBUMIN/GLOB SERPL: 1.3 G/DL
ALP SERPL-CCNC: 134 U/L (ref 30–99)
ALT SERPL-CCNC: 17 U/L (ref 2–50)
ANION GAP SERPL CALC-SCNC: 11 MMOL/L (ref 7–16)
AST SERPL-CCNC: 17 U/L (ref 12–45)
BASOPHILS # BLD AUTO: 0.2 % (ref 0–1.8)
BASOPHILS # BLD: 0.02 K/UL (ref 0–0.12)
BILIRUB SERPL-MCNC: 0.6 MG/DL (ref 0.1–1.5)
BUN SERPL-MCNC: 15 MG/DL (ref 8–22)
CALCIUM ALBUM COR SERPL-MCNC: 9.4 MG/DL (ref 8.5–10.5)
CALCIUM SERPL-MCNC: 9.4 MG/DL (ref 8.5–10.5)
CHLORIDE SERPL-SCNC: 105 MMOL/L (ref 96–112)
CO2 SERPL-SCNC: 25 MMOL/L (ref 20–33)
CREAT SERPL-MCNC: 0.95 MG/DL (ref 0.5–1.4)
EKG IMPRESSION: NORMAL
EKG IMPRESSION: NORMAL
EOSINOPHIL # BLD AUTO: 0.15 K/UL (ref 0–0.51)
EOSINOPHIL NFR BLD: 1.5 % (ref 0–6.9)
ERYTHROCYTE [DISTWIDTH] IN BLOOD BY AUTOMATED COUNT: 48.9 FL (ref 35.9–50)
GFR SERPLBLD CREATININE-BSD FMLA CKD-EPI: 72 ML/MIN/1.73 M 2
GLOBULIN SER CALC-MCNC: 3 G/DL (ref 1.9–3.5)
GLUCOSE SERPL-MCNC: 92 MG/DL (ref 65–99)
HCT VFR BLD AUTO: 38.5 % (ref 37–47)
HGB BLD-MCNC: 11.9 G/DL (ref 12–16)
IMM GRANULOCYTES # BLD AUTO: 0.02 K/UL (ref 0–0.11)
IMM GRANULOCYTES NFR BLD AUTO: 0.2 % (ref 0–0.9)
LYMPHOCYTES # BLD AUTO: 2.2 K/UL (ref 1–4.8)
LYMPHOCYTES NFR BLD: 21.7 % (ref 22–41)
MCH RBC QN AUTO: 26.4 PG (ref 27–33)
MCHC RBC AUTO-ENTMCNC: 30.9 G/DL (ref 32.2–35.5)
MCV RBC AUTO: 85.4 FL (ref 81.4–97.8)
MONOCYTES # BLD AUTO: 0.55 K/UL (ref 0–0.85)
MONOCYTES NFR BLD AUTO: 5.4 % (ref 0–13.4)
NEUTROPHILS # BLD AUTO: 7.2 K/UL (ref 1.82–7.42)
NEUTROPHILS NFR BLD: 71 % (ref 44–72)
NRBC # BLD AUTO: 0 K/UL
NRBC BLD-RTO: 0 /100 WBC (ref 0–0.2)
NT-PROBNP SERPL IA-MCNC: 295 PG/ML (ref 0–125)
PLATELET # BLD AUTO: 277 K/UL (ref 164–446)
PMV BLD AUTO: 10.8 FL (ref 9–12.9)
POTASSIUM SERPL-SCNC: 4.4 MMOL/L (ref 3.6–5.5)
PROT SERPL-MCNC: 7 G/DL (ref 6–8.2)
RBC # BLD AUTO: 4.51 M/UL (ref 4.2–5.4)
SODIUM SERPL-SCNC: 141 MMOL/L (ref 135–145)
TROPONIN T SERPL-MCNC: 30 NG/L (ref 6–19)
TROPONIN T SERPL-MCNC: 35 NG/L (ref 6–19)
WBC # BLD AUTO: 10.1 K/UL (ref 4.8–10.8)

## 2025-02-23 PROCEDURE — 99285 EMERGENCY DEPT VISIT HI MDM: CPT

## 2025-02-23 PROCEDURE — 36415 COLL VENOUS BLD VENIPUNCTURE: CPT

## 2025-02-23 PROCEDURE — 85025 COMPLETE CBC W/AUTO DIFF WBC: CPT

## 2025-02-23 PROCEDURE — 71045 X-RAY EXAM CHEST 1 VIEW: CPT

## 2025-02-23 PROCEDURE — 83880 ASSAY OF NATRIURETIC PEPTIDE: CPT

## 2025-02-23 PROCEDURE — 700111 HCHG RX REV CODE 636 W/ 250 OVERRIDE (IP): Mod: JZ,UD | Performed by: EMERGENCY MEDICINE

## 2025-02-23 PROCEDURE — 93005 ELECTROCARDIOGRAM TRACING: CPT | Mod: TC

## 2025-02-23 PROCEDURE — 96375 TX/PRO/DX INJ NEW DRUG ADDON: CPT

## 2025-02-23 PROCEDURE — 80053 COMPREHEN METABOLIC PANEL: CPT

## 2025-02-23 PROCEDURE — 700105 HCHG RX REV CODE 258: Mod: UD | Performed by: EMERGENCY MEDICINE

## 2025-02-23 PROCEDURE — 96374 THER/PROPH/DIAG INJ IV PUSH: CPT

## 2025-02-23 PROCEDURE — 84484 ASSAY OF TROPONIN QUANT: CPT

## 2025-02-23 PROCEDURE — 93005 ELECTROCARDIOGRAM TRACING: CPT | Mod: TC | Performed by: EMERGENCY MEDICINE

## 2025-02-23 RX ORDER — ONDANSETRON 2 MG/ML
4 INJECTION INTRAMUSCULAR; INTRAVENOUS ONCE
Status: COMPLETED | OUTPATIENT
Start: 2025-02-23 | End: 2025-02-23

## 2025-02-23 RX ORDER — MORPHINE SULFATE 4 MG/ML
4 INJECTION INTRAVENOUS ONCE
Status: COMPLETED | OUTPATIENT
Start: 2025-02-23 | End: 2025-02-23

## 2025-02-23 RX ORDER — SODIUM CHLORIDE 9 MG/ML
1000 INJECTION, SOLUTION INTRAVENOUS ONCE
Status: COMPLETED | OUTPATIENT
Start: 2025-02-23 | End: 2025-02-23

## 2025-02-23 RX ADMIN — SODIUM CHLORIDE 1000 ML: 9 INJECTION, SOLUTION INTRAVENOUS at 13:52

## 2025-02-23 RX ADMIN — MORPHINE SULFATE 4 MG: 4 INJECTION, SOLUTION INTRAMUSCULAR; INTRAVENOUS at 13:51

## 2025-02-23 RX ADMIN — ONDANSETRON 4 MG: 2 INJECTION INTRAMUSCULAR; INTRAVENOUS at 13:52

## 2025-02-23 ASSESSMENT — PAIN DESCRIPTION - PAIN TYPE
TYPE: ACUTE PAIN
TYPE: ACUTE PAIN

## 2025-02-23 ASSESSMENT — HEART SCORE
AGE: 45-64
HEART SCORE: 3
HISTORY: SLIGHTLY SUSPICIOUS
TROPONIN: 1-3 TIMES NORMAL LIMIT
RISK FACTORS: 1-2 RISK FACTORS
ECG: NORMAL

## 2025-02-23 NOTE — ED TRIAGE NOTES
Chief Complaint   Patient presents with    Chest Pain     Pt arrives via [facility] for CP that started at 0930 today.  Accompanied with nausea.      Leg Pain     Pt reports B LE pain, bit R LE hurts more than L.  Legs are edematous and brady in appearance.

## 2025-02-23 NOTE — ED PROVIDER NOTES
ED Provider Note    CHIEF COMPLAINT  Chief Complaint   Patient presents with    Chest Pain     Pt arrives via [facility] for CP that started at 0930 today.  Accompanied with nausea.      Leg Pain     Pt reports B LE pain, bit R LE hurts more than L.  Legs are edematous and brady in appearance.         EXTERNAL RECORDS REVIEWED  1/10/2025: Outpatient orthopedic note: Status post left talus ORIF on 2024    HPI/ROS      Lorenesamina Haro is a 49 y.o. female who presents for evaluation of chest pain and nausea that began this morning at [employer].  Constant since onset.  Also has chronic but worsening pain in both of her legs right greater than left.  History of A-fib, anticoagulated.  No coughing.  No back pain.  No focal weakness numbness or tingling.  Reports this seems to happen there intermittently and she suspects it is related to her paroxysmal atrial fibrillation.  No blood in her vomit.  No other acute complaints offered at this time.  She reports complete compliance with her anticoagulation.    PAST MEDICAL HISTORY   has a past medical history of A-fib ([facility]), MONI (acute kidney injury) ([facility]) (2023), Asthma, Bipolar 2 disorder ([facility]), Chickenpox, Chronic obstructive pulmonary disease (HCC), Congestive heart failure (HCC), Hypertension, On home oxygen therapy, Other psychological stress, Schizophrenic disorder ([facility]), and Yeast infection of the skin (2021).    SURGICAL HISTORY   has a past surgical history that includes hysterectomy laparoscopy; colectomy; cholecystectomy; knee arthroscopy (Left); and orif, ankle (Left, 2024).    FAMILY HISTORY  Family History   Problem Relation Age of Onset    No Known Problems Mother     Cancer Sister        SOCIAL HISTORY  Social History     Tobacco Use    Smoking status: Former     Current packs/day: 0.00     Types: Cigarettes     Quit date: 10/12/2022     Years since quittin.3    Smokeless tobacco: Never   Vaping Use    Vaping status: Never Used   Substance  and Sexual Activity    Alcohol use: Not Currently    Drug use: Never    Sexual activity: Not Currently       CURRENT MEDICATIONS  Home Medications       Reviewed by Imelda Eldridge R.N. (Registered Nurse) on 02/23/25 at 1218  Med List Status: Partial     Medication Last Dose Status   ADVAIR DISKUS 100-50 MCG/ACT AEROSOL POWDER, BREATH ACTIVATED  Active   apixaban (ELIQUIS) 5mg Tab  Active   aripiprazole (ABILIFY) 30 MG tablet  Active   ascorbic acid (VITAMIN C) 500 MG tablet  Active   atorvastatin (LIPITOR) 20 MG Tab  Active   calcium carbonate (OS-ABELARDO 500) 500 MG Tab  Active   cyclobenzaprine (FLEXERIL) 10 mg Tab  Active   ferrous sulfate 325 (65 Fe) MG tablet  Active   fluticasone (FLONASE) 50 MCG/ACT nasal spray  Active   gabapentin (NEURONTIN) 300 MG Cap  Active   Home Care Oxygen  Active   hydrOXYzine HCl (ATARAX) 25 MG Tab  Active   ketoconazole (NIZORAL) 2 % Cream  Active   lidocaine (LIDODERM) 5 % Patch  Active   metoprolol SR (TOPROL XL) 50 MG TABLET SR 24 HR  Active   montelukast (SINGULAIR) 10 MG Tab  Active   nicotine (NICODERM) 14 MG/24HR PATCH 24 HR  Active   nicotine (NICODERM) 21 MG/24HR PATCH 24 HR  Active   nicotine (NICODERM) 7 MG/24HR PATCH 24 HR  Active   polyethylene glycol/lytes (MIRALAX) Pack  Active   senna-docusate (PERICOLACE OR SENOKOT S) 8.6-50 MG Tab  Active   Vitamin D, Cholecalciferol, (CHOLECALCIFEROL) 25 MCG (1000 UT) Tab  Active                  Audit from Redirected Encounters    **Home medications have not yet been reviewed for this encounter**         ALLERGIES  Allergies   Allergen Reactions    Amoxicillin Rash and Itching     Tolerates cephalosporins, pt reports that she gets a rash all over her body and gets itchy    Penicillin G Rash and Itching     pt reports that she gets a rash all over her body and gets itchy       PHYSICAL EXAM  VITAL SIGNS: /74   Pulse 64   Temp 36.7 °C (98 °F) (Temporal)   Resp 16   LMP  (LMP Unknown)   SpO2 97%    Constitutional: Well  appearing patient in no acute distress.  Awake and alert, not toxic nor ill in appearance.  HENT: Normocephalic, no obvious evidence of acute trauma.  Eyes: No scleral icterus. Normal conjunctiva   Thorax & Lungs: Normal nonlabored respirations. I appreciate no wheezing, rhonchi or rales. There is normal air movement.  Upon cardiac ascultation I appreciate a regular heart rhythm and a normal rate.   Abdomen: The abdomen is not visibly distended. Upon palpation, I find it to be without tenderness.  No mass appreciated.  Skin: The exposed portions of skin reveal no obvious rash or other abnormalities.  Extremities/Musculoskeletal: Inspection of the lower extremities revealed no asymmetry.  Minimal erythema bilaterally, no focal tenderness of the calves.  No asymmetric edema appreciated.  Neurologic: Alert & oriented. No focal deficits observed.       EKG/LABS  Results for orders placed or performed during the hospital encounter of 02/23/25   CBC with Differential    Collection Time: 02/23/25 12:34 PM   Result Value Ref Range    WBC 10.1 4.8 - 10.8 K/uL    RBC 4.51 4.20 - 5.40 M/uL    Hemoglobin 11.9 (L) 12.0 - 16.0 g/dL    Hematocrit 38.5 37.0 - 47.0 %    MCV 85.4 81.4 - 97.8 fL    MCH 26.4 (L) 27.0 - 33.0 pg    MCHC 30.9 (L) 32.2 - 35.5 g/dL    RDW 48.9 35.9 - 50.0 fL    Platelet Count 277 164 - 446 K/uL    MPV 10.8 9.0 - 12.9 fL    Neutrophils-Polys 71.00 44.00 - 72.00 %    Lymphocytes 21.70 (L) 22.00 - 41.00 %    Monocytes 5.40 0.00 - 13.40 %    Eosinophils 1.50 0.00 - 6.90 %    Basophils 0.20 0.00 - 1.80 %    Immature Granulocytes 0.20 0.00 - 0.90 %    Nucleated RBC 0.00 0.00 - 0.20 /100 WBC    Neutrophils (Absolute) 7.20 1.82 - 7.42 K/uL    Lymphs (Absolute) 2.20 1.00 - 4.80 K/uL    Monos (Absolute) 0.55 0.00 - 0.85 K/uL    Eos (Absolute) 0.15 0.00 - 0.51 K/uL    Baso (Absolute) 0.02 0.00 - 0.12 K/uL    Immature Granulocytes (abs) 0.02 0.00 - 0.11 K/uL    NRBC (Absolute) 0.00 K/uL   Complete Metabolic Panel (CMP)     Collection Time: 25 12:34 PM   Result Value Ref Range    Sodium 141 135 - 145 mmol/L    Potassium 4.4 3.6 - 5.5 mmol/L    Chloride 105 96 - 112 mmol/L    Co2 25 20 - 33 mmol/L    Anion Gap 11.0 7.0 - 16.0    Glucose 92 65 - 99 mg/dL    Bun 15 8 - 22 mg/dL    Creatinine 0.95 0.50 - 1.40 mg/dL    Calcium 9.4 8.5 - 10.5 mg/dL    Correct Calcium 9.4 8.5 - 10.5 mg/dL    AST(SGOT) 17 12 - 45 U/L    ALT(SGPT) 17 2 - 50 U/L    Alkaline Phosphatase 134 (H) 30 - 99 U/L    Total Bilirubin 0.6 0.1 - 1.5 mg/dL    Albumin 4.0 3.2 - 4.9 g/dL    Total Protein 7.0 6.0 - 8.2 g/dL    Globulin 3.0 1.9 - 3.5 g/dL    A-G Ratio 1.3 g/dL   proBrain Natriuretic Peptide, NT (BNP)    Collection Time: 25 12:34 PM   Result Value Ref Range    NT-proBNP 295 (H) 0 - 125 pg/mL   Troponins NOW    Collection Time: 25 12:34 PM   Result Value Ref Range    Troponin T 35 (H) 6 - 19 ng/L   ESTIMATED GFR    Collection Time: 25 12:34 PM   Result Value Ref Range    GFR (CKD-EPI) 72 >60 mL/min/1.73 m 2   EKG    Collection Time: 25  1:26 PM   Result Value Ref Range    Report       [facility] Emergency Dept.    Test Date:  2025  Pt Name:    ADELINA MILLAN                Department: ER  MRN:        [id number]                      Room:  Gender:     Female                       Technician: [id number]  :        [id number]                   Requested By:ER TRIAGE PROTOCOL  Order #:    [id number]                    Reading MD: BISI CLARK MD    Measurements  Intervals                                Axis  Rate:       63                           P:          71  OK:         140                          QRS:        34  QRSD:       116                          T:          -3  QT:         392  QTc:        402    Interpretive Statements  Sinus rhythm rate of 63, normal OK interval, incomplete right bundle branch  block.  Normal QTc.  T wave inversions noted in lead III, no ST segment  deviation.  Frequent PACs.  My  impression: No considerable change compared to  12/31/2024.  No acute ischemia  Electronically Signed On 02- 13:26:19 PST by ALVAREZ CLARK MD     Troponins in two (2) hours    Collection Time: 02/23/25  2:45 PM   Result Value Ref Range    Troponin T 30 (H) 6 - 19 ng/L     *Note: Due to a large number of results and/or encounters for the requested time period, some results have not been displayed. A complete set of results can be found in Results Review.      I have independently interpreted this EKG    RADIOLOGY/PROCEDURES   I have independently interpreted the diagnostic imaging associated with this visit and am waiting the final reading from the radiologist.   My preliminary interpretation is as follows: No infiltrate, no pneumothorax    Radiologist interpretation:  DX-CHEST-PORTABLE (1 VIEW)   Final Result      No acute cardiopulmonary abnormality.             COURSE & MEDICAL DECISION MAKING    ASSESSMENT, COURSE AND PLAN  Care Narrative: This is a 49-year-old female who developed chest pain and some nausea while at [employer] this morning.  Constantly present for about 5 hours or so prior to arrival.  On exam she does not appear to be acutely ill.  Her abdomen is benign.  Her chest is clear.  Her EKG is nonischemic.  She is anticoagulated, certainly not DVT or PE.  Her troponin is elevated at baseline, her BNP is also elevated at baseline as well.  Normal WBC.  Chronic minimal anemia.  Normal LFTs GFR and electrolytes.  Chronic mild elevation alkaline phosphatase.  Chest x-ray is clear.  This entire workup reveals no acute abnormalities.  Treated here in emergency department some morphine, Zofran and IV fluids and is feeling improved.  Discharged home in stable condition with strict return instructions provided    CHEST PAIN:   HEART Score for Major Cardiac Events  HEART Score     History: Slightly suspicious  ECG: Normal  Age: 45-64  Risk Factors: 1-2 risk factors  Troponin: 1-3 times normal  limit    Heart Score: 3 (with chronically elevated troponin)    Total Score   0-3 Points = Low Score, risk of MACE 0.9-1.7%.  4-6 Points = Moderate Score, risk of MACE 12-16.6%  7-10 Points = High Score, risk of MACE 50-65%      FINAL DIAGNOSIS  1. Acute chest pain    2. Nausea         Electronically signed by: Leobardo Parker M.D., 2/23/2025 1:26 PM

## 2025-02-23 NOTE — ED NOTES
Pt ambulated to ER room 39 with walker, steady gait , pt placed on gurney , pt changed into gown , pt attached to monitor , PIV established , labs drawn and sent. Pt given call light and instructions on how to use, pt chart up for ERP to see.

## 2025-02-23 NOTE — ED NOTES
Bedside report received from JC Calhoun. Assumed patient care. Verified patient identification. Patient resting in bed, connected to monitor, respirations even and unlabored. Patient currently on RA,  Vital signs is stable. Discussed plan of care. Patient denied any new complaints. Standard safety precautions in place. Gurney in low position, side rail up for pt safety. Call light within reach. Food and water given. Patient ambulatory with 4 wheel walker.

## 2025-02-24 NOTE — DISCHARGE PLANNING
KETTY scheduled transport via [facility] with [employer] benefits for pt to go back to [facility] [address] [place] [address].

## 2025-02-24 NOTE — ED NOTES
Pt discharged, all appropriate hospital equipment removed (IV, monitor, pulse ox, etc.). Pt left unit via walking with stable gait and 4 wheel walker to ED lobby for transport home via [employer]. Personal belongings with pt when leaving unit. Pt given discharge instructions prior to leaving unit including where to  prescriptions and when to follow-up; verbalizes understanding. Pt informed to return to ED if symptoms worsen/return or altered status develop. Copy of discharge instructions signed and turned into DC basket and copy sent with pt.

## 2025-02-25 ENCOUNTER — APPOINTMENT (OUTPATIENT)
Dept: RADIOLOGY | Facility: MEDICAL CENTER | Age: 50
End: 2025-02-25
Payer: MEDICAID

## 2025-02-25 ENCOUNTER — HOSPITAL ENCOUNTER (EMERGENCY)
Facility: MEDICAL CENTER | Age: 50
End: 2025-02-25
Attending: EMERGENCY MEDICINE
Payer: MEDICAID

## 2025-02-25 VITALS
WEIGHT: 261 LBS | HEIGHT: 62 IN | RESPIRATION RATE: 17 BRPM | OXYGEN SATURATION: 94 % | TEMPERATURE: 98 F | DIASTOLIC BLOOD PRESSURE: 60 MMHG | HEART RATE: 63 BPM | SYSTOLIC BLOOD PRESSURE: 132 MMHG | BODY MASS INDEX: 48.03 KG/M2

## 2025-02-25 DIAGNOSIS — R11.0 NAUSEA: ICD-10-CM

## 2025-02-25 DIAGNOSIS — R07.9 CHEST PAIN, UNSPECIFIED TYPE: ICD-10-CM

## 2025-02-25 LAB
ALBUMIN SERPL BCP-MCNC: 3.9 G/DL (ref 3.2–4.9)
ALBUMIN/GLOB SERPL: 1.3 G/DL
ALP SERPL-CCNC: 138 U/L (ref 30–99)
ALT SERPL-CCNC: 15 U/L (ref 2–50)
ANION GAP SERPL CALC-SCNC: 11 MMOL/L (ref 7–16)
ANISOCYTOSIS BLD QL SMEAR: ABNORMAL
AST SERPL-CCNC: 15 U/L (ref 12–45)
BASOPHILS # BLD AUTO: 0.1 % (ref 0–1.8)
BASOPHILS # BLD: 0.01 K/UL (ref 0–0.12)
BILIRUB SERPL-MCNC: 0.7 MG/DL (ref 0.1–1.5)
BUN SERPL-MCNC: 19 MG/DL (ref 8–22)
CALCIUM ALBUM COR SERPL-MCNC: 9.7 MG/DL (ref 8.5–10.5)
CALCIUM SERPL-MCNC: 9.6 MG/DL (ref 8.5–10.5)
CHLORIDE SERPL-SCNC: 105 MMOL/L (ref 96–112)
CO2 SERPL-SCNC: 26 MMOL/L (ref 20–33)
COMMENT 1642: NORMAL
CREAT SERPL-MCNC: 1.03 MG/DL (ref 0.5–1.4)
EKG IMPRESSION: NORMAL
EOSINOPHIL # BLD AUTO: 0.09 K/UL (ref 0–0.51)
EOSINOPHIL NFR BLD: 1 % (ref 0–6.9)
ERYTHROCYTE [DISTWIDTH] IN BLOOD BY AUTOMATED COUNT: 49.3 FL (ref 35.9–50)
FLUAV RNA SPEC QL NAA+PROBE: NEGATIVE
FLUBV RNA SPEC QL NAA+PROBE: NEGATIVE
GFR SERPLBLD CREATININE-BSD FMLA CKD-EPI: 66 ML/MIN/1.73 M 2
GLOBULIN SER CALC-MCNC: 3.1 G/DL (ref 1.9–3.5)
GLUCOSE SERPL-MCNC: 167 MG/DL (ref 65–99)
HCG SERPL QL: NEGATIVE
HCT VFR BLD AUTO: 38.1 % (ref 37–47)
HGB BLD-MCNC: 11.4 G/DL (ref 12–16)
IMM GRANULOCYTES # BLD AUTO: 0.03 K/UL (ref 0–0.11)
IMM GRANULOCYTES NFR BLD AUTO: 0.3 % (ref 0–0.9)
LYMPHOCYTES # BLD AUTO: 1.65 K/UL (ref 1–4.8)
LYMPHOCYTES NFR BLD: 17.7 % (ref 22–41)
MCH RBC QN AUTO: 25.7 PG (ref 27–33)
MCHC RBC AUTO-ENTMCNC: 29.9 G/DL (ref 32.2–35.5)
MCV RBC AUTO: 85.8 FL (ref 81.4–97.8)
MICROCYTES BLD QL SMEAR: ABNORMAL
MONOCYTES # BLD AUTO: 0.35 K/UL (ref 0–0.85)
MONOCYTES NFR BLD AUTO: 3.8 % (ref 0–13.4)
MORPHOLOGY BLD-IMP: NORMAL
NEUTROPHILS # BLD AUTO: 7.17 K/UL (ref 1.82–7.42)
NEUTROPHILS NFR BLD: 77.1 % (ref 44–72)
NRBC # BLD AUTO: 0 K/UL
NRBC BLD-RTO: 0 /100 WBC (ref 0–0.2)
NT-PROBNP SERPL IA-MCNC: 446 PG/ML (ref 0–125)
OVALOCYTES BLD QL SMEAR: NORMAL
PLATELET # BLD AUTO: 260 K/UL (ref 164–446)
PLATELET BLD QL SMEAR: NORMAL
PMV BLD AUTO: 10.8 FL (ref 9–12.9)
POIKILOCYTOSIS BLD QL SMEAR: NORMAL
POTASSIUM SERPL-SCNC: 4 MMOL/L (ref 3.6–5.5)
PROT SERPL-MCNC: 7 G/DL (ref 6–8.2)
RBC # BLD AUTO: 4.44 M/UL (ref 4.2–5.4)
RBC BLD AUTO: PRESENT
RSV RNA SPEC QL NAA+PROBE: NEGATIVE
SARS-COV-2 RNA RESP QL NAA+PROBE: NOTDETECTED
SODIUM SERPL-SCNC: 142 MMOL/L (ref 135–145)
TROPONIN T SERPL-MCNC: 45 NG/L (ref 6–19)
TROPONIN T SERPL-MCNC: 52 NG/L (ref 6–19)
WBC # BLD AUTO: 9.3 K/UL (ref 4.8–10.8)

## 2025-02-25 PROCEDURE — 93005 ELECTROCARDIOGRAM TRACING: CPT | Mod: TC

## 2025-02-25 PROCEDURE — 96374 THER/PROPH/DIAG INJ IV PUSH: CPT

## 2025-02-25 PROCEDURE — 0241U HCHG SARS-COV-2 COVID-19 NFCT DS RESP RNA 4 TRGT ED POC: CPT

## 2025-02-25 PROCEDURE — 700111 HCHG RX REV CODE 636 W/ 250 OVERRIDE (IP): Mod: JZ,UD | Performed by: EMERGENCY MEDICINE

## 2025-02-25 PROCEDURE — 83880 ASSAY OF NATRIURETIC PEPTIDE: CPT

## 2025-02-25 PROCEDURE — 85025 COMPLETE CBC W/AUTO DIFF WBC: CPT

## 2025-02-25 PROCEDURE — 84484 ASSAY OF TROPONIN QUANT: CPT

## 2025-02-25 PROCEDURE — 71045 X-RAY EXAM CHEST 1 VIEW: CPT

## 2025-02-25 PROCEDURE — 36415 COLL VENOUS BLD VENIPUNCTURE: CPT

## 2025-02-25 PROCEDURE — 93005 ELECTROCARDIOGRAM TRACING: CPT | Mod: TC | Performed by: EMERGENCY MEDICINE

## 2025-02-25 PROCEDURE — 99285 EMERGENCY DEPT VISIT HI MDM: CPT

## 2025-02-25 PROCEDURE — 80053 COMPREHEN METABOLIC PANEL: CPT

## 2025-02-25 PROCEDURE — 96375 TX/PRO/DX INJ NEW DRUG ADDON: CPT

## 2025-02-25 PROCEDURE — 84703 CHORIONIC GONADOTROPIN ASSAY: CPT

## 2025-02-25 RX ORDER — ONDANSETRON 2 MG/ML
4 INJECTION INTRAMUSCULAR; INTRAVENOUS ONCE
Status: COMPLETED | OUTPATIENT
Start: 2025-02-25 | End: 2025-02-25

## 2025-02-25 RX ORDER — KETOROLAC TROMETHAMINE 15 MG/ML
15 INJECTION, SOLUTION INTRAMUSCULAR; INTRAVENOUS ONCE
Status: COMPLETED | OUTPATIENT
Start: 2025-02-25 | End: 2025-02-25

## 2025-02-25 RX ADMIN — KETOROLAC TROMETHAMINE 15 MG: 15 INJECTION, SOLUTION INTRAMUSCULAR; INTRAVENOUS at 15:21

## 2025-02-25 RX ADMIN — ONDANSETRON 4 MG: 2 INJECTION INTRAMUSCULAR; INTRAVENOUS at 15:21

## 2025-02-25 ASSESSMENT — HEART SCORE
ECG: NORMAL
AGE: 45-64
TROPONIN: 1-3 TIMES NORMAL LIMIT
HISTORY: SLIGHTLY SUSPICIOUS
HEART SCORE: 3
RISK FACTORS: 1-2 RISK FACTORS

## 2025-02-25 ASSESSMENT — FIBROSIS 4 INDEX: FIB4 SCORE: 0.76

## 2025-02-25 NOTE — ED PROVIDER NOTES
ED Provider Note  ED PHYSICIAN NOTE    CHIEF COMPLAINT  Chief Complaint   Patient presents with    Chest Pain     Midsternal CP beginning 1 hr ago after lunch. Associated nausea     Leg Pain     Increased bilateral leg pain and swelling.        EXTERNAL RECORDS REVIEWED  Outpatient Notes from orthopedics January 10, 2025, outpatient note after talus ORIF November 2024, was seen in the emergency department here 2 days ago for chest pain and nausea, she had a evaluation with troponin 35 , ECG that was unremarkable, delta troponin of 30 negative x-ray    HPI/ROS  LIMITATION TO HISTORY   Select: : None  OUTSIDE HISTORIAN(S):  none    Lorene Haro is a 49 y.o. female who presents with chest pain.  Patient reports she has had chest pain for 1 day, triage note states 1 hour ago although patient tells me it has been present for 1 day.  It is in the center of her chest and more of a pressure sensation.  She states it hurts worse when she pushes on it but no other alleviating or aggravating factors.  It does not hurt worse when she is walking around or changes in position or when she takes a deep breath.  She reports no radiation and no back pain or ripping or tearing sensation.  She reports no shortness of breath although she has had some cough and congestion.  No known fevers.  She also reports some nausea and an episode of vomiting after eating lunch earlier today.  She reports no new leg pain and swelling although tells me they always feel this way.  No recent travel.  No abdominal pain.  No palpitations or lightheadedness    PAST MEDICAL HISTORY  Past Medical History:   Diagnosis Date    A-fib ([facility])     MONI (acute kidney injury) ([facility]) 08/09/2023    Asthma     Bipolar 2 disorder (HCC)     Chickenpox     Chronic obstructive pulmonary disease (HCC)     Congestive heart failure (HCC)     Hypertension     On home oxygen therapy     Other psychological stress     Schizophrenic disorder (HCC)     Yeast infection of  the skin 2021       SOCIAL HISTORY  Social History     Tobacco Use    Smoking status: Former     Current packs/day: 0.00     Types: Cigarettes     Quit date: 10/12/2022     Years since quittin.3    Smokeless tobacco: Never   Vaping Use    Vaping status: Never Used   Substance Use Topics    Alcohol use: Not Currently    Drug use: Never       CURRENT MEDICATIONS  Home Medications       Reviewed by Mel Paz R.N. (Registered Nurse) on 25 at 1210  Med List Status: Not Addressed     Medication Last Dose Status   ADVAIR DISKUS 100-50 MCG/ACT AEROSOL POWDER, BREATH ACTIVATED  Active   apixaban (ELIQUIS) 5mg Tab  Active   aripiprazole (ABILIFY) 30 MG tablet  Active   ascorbic acid (VITAMIN C) 500 MG tablet  Active   atorvastatin (LIPITOR) 20 MG Tab  Active   calcium carbonate (OS-ABELARDO 500) 500 MG Tab  Active   cyclobenzaprine (FLEXERIL) 10 mg Tab  Active   ferrous sulfate 325 (65 Fe) MG tablet  Active   fluticasone (FLONASE) 50 MCG/ACT nasal spray  Active   gabapentin (NEURONTIN) 300 MG Cap  Active   Home Care Oxygen  Active   hydrOXYzine HCl (ATARAX) 25 MG Tab  Active   ketoconazole (NIZORAL) 2 % Cream  Active   lidocaine (LIDODERM) 5 % Patch  Active   metoprolol SR (TOPROL XL) 50 MG TABLET SR 24 HR  Active   montelukast (SINGULAIR) 10 MG Tab  Active   nicotine (NICODERM) 14 MG/24HR PATCH 24 HR  Active   nicotine (NICODERM) 21 MG/24HR PATCH 24 HR  Active   nicotine (NICODERM) 7 MG/24HR PATCH 24 HR  Active   polyethylene glycol/lytes (MIRALAX) Pack  Active   senna-docusate (PERICOLACE OR SENOKOT S) 8.6-50 MG Tab  Active   Vitamin D, Cholecalciferol, (CHOLECALCIFEROL) 25 MCG (1000 UT) Tab  Active                    ALLERGIES  Allergies   Allergen Reactions    Amoxicillin Rash and Itching     Tolerates cephalosporins, pt reports that she gets a rash all over her body and gets itchy    Penicillin G Rash and Itching     pt reports that she gets a rash all over her body and gets itchy       PHYSICAL  "EXAM  VITAL SIGNS: /60   Pulse 63   Temp 36.7 °C (98 °F) (Temporal)   Resp 17   Ht 1.575 m (5' 2\")   Wt 118 kg (261 lb)   LMP  (LMP Unknown)   SpO2 94%   BMI 47.74 kg/m²    Constitutional: Awake and alert   HENT: Normal inspection, no signs of trauma  Eyes: Normal inspection, Pupils equal, non-icteric  Neck: Grossly normal range of motion. No stridor  Cardiovascular: Regular rate and rhythm, no murmurs.  Symmetric peripheral pulses at radial   Thorax & Lungs: No respiratory distress, No wheezing, No rales, No rhonchi, anterior chest wall tenderness.   Abdomen:  soft, non-distended, nontender, no mass  Skin: No obvious rash. Warm. Dry.   Back: No tenderness, No CVA tenderness.   Extremities: No cyanosis, 1+ bilateral lower extremity edema, negative Homans, no calf tenderness  Neurologic: AO3, speech is fluent, cranial nerves are intact, intact  strength bilaterally, no drift of the upper extremities, no drift of the lower extremities, sensation is intact to light touch bilaterally through the upper and lower extremities  Psychiatric: Normal affect for situation        DIAGNOSTIC STUDIES / PROCEDURES  LABS/EKG  Results for orders placed or performed during the hospital encounter of 02/25/25   CBC with Differential    Collection Time: 02/25/25 12:02 PM   Result Value Ref Range    WBC 9.3 4.8 - 10.8 K/uL    RBC 4.44 4.20 - 5.40 M/uL    Hemoglobin 11.4 (L) 12.0 - 16.0 g/dL    Hematocrit 38.1 37.0 - 47.0 %    MCV 85.8 81.4 - 97.8 fL    MCH 25.7 (L) 27.0 - 33.0 pg    MCHC 29.9 (L) 32.2 - 35.5 g/dL    RDW 49.3 35.9 - 50.0 fL    Platelet Count 260 164 - 446 K/uL    MPV 10.8 9.0 - 12.9 fL    Neutrophils-Polys 77.10 (H) 44.00 - 72.00 %    Lymphocytes 17.70 (L) 22.00 - 41.00 %    Monocytes 3.80 0.00 - 13.40 %    Eosinophils 1.00 0.00 - 6.90 %    Basophils 0.10 0.00 - 1.80 %    Immature Granulocytes 0.30 0.00 - 0.90 %    Nucleated RBC 0.00 0.00 - 0.20 /100 WBC    Neutrophils (Absolute) 7.17 1.82 - 7.42 K/uL    " Lymphs (Absolute) 1.65 1.00 - 4.80 K/uL    Monos (Absolute) 0.35 0.00 - 0.85 K/uL    Eos (Absolute) 0.09 0.00 - 0.51 K/uL    Baso (Absolute) 0.01 0.00 - 0.12 K/uL    Immature Granulocytes (abs) 0.03 0.00 - 0.11 K/uL    NRBC (Absolute) 0.00 K/uL    Anisocytosis 1+     Microcytosis 1+    Complete Metabolic Panel (CMP)    Collection Time: 02/25/25 12:02 PM   Result Value Ref Range    Sodium 142 135 - 145 mmol/L    Potassium 4.0 3.6 - 5.5 mmol/L    Chloride 105 96 - 112 mmol/L    Co2 26 20 - 33 mmol/L    Anion Gap 11.0 7.0 - 16.0    Glucose 167 (H) 65 - 99 mg/dL    Bun 19 8 - 22 mg/dL    Creatinine 1.03 0.50 - 1.40 mg/dL    Calcium 9.6 8.5 - 10.5 mg/dL    Correct Calcium 9.7 8.5 - 10.5 mg/dL    AST(SGOT) 15 12 - 45 U/L    ALT(SGPT) 15 2 - 50 U/L    Alkaline Phosphatase 138 (H) 30 - 99 U/L    Total Bilirubin 0.7 0.1 - 1.5 mg/dL    Albumin 3.9 3.2 - 4.9 g/dL    Total Protein 7.0 6.0 - 8.2 g/dL    Globulin 3.1 1.9 - 3.5 g/dL    A-G Ratio 1.3 g/dL   proBrain Natriuretic Peptide, NT (BNP_    Collection Time: 02/25/25 12:02 PM   Result Value Ref Range    NT-proBNP 446 (H) 0 - 125 pg/mL   Troponins NOW    Collection Time: 02/25/25 12:02 PM   Result Value Ref Range    Troponin T 45 (H) 6 - 19 ng/L   HCG Qual Serum    Collection Time: 02/25/25 12:02 PM   Result Value Ref Range    Beta-Hcg Qualitative Serum Negative Negative   ESTIMATED GFR    Collection Time: 02/25/25 12:02 PM   Result Value Ref Range    GFR (CKD-EPI) 66 >60 mL/min/1.73 m 2   PLATELET ESTIMATE    Collection Time: 02/25/25 12:02 PM   Result Value Ref Range    Plt Estimation Normal    MORPHOLOGY    Collection Time: 02/25/25 12:02 PM   Result Value Ref Range    RBC Morphology Present     Poikilocytosis 1+     Ovalocytes 1+    PERIPHERAL SMEAR REVIEW    Collection Time: 02/25/25 12:02 PM   Result Value Ref Range    Peripheral Smear Review see below    DIFFERENTIAL COMMENT    Collection Time: 02/25/25 12:02 PM   Result Value Ref Range    Comments-Diff see below     EKG    Collection Time: 25 12:27 PM   Result Value Ref Range    Report       [facility]    Test Date:  2025  Pt Name:    ADELINA MILLAN                Department: ER  MRN:        [id number]                      Room:        23  Gender:     Female                       Technician: [id number]  :        1975                   Requested By:ER TRIAGE PROTOCOL  Order #:    [id number]                    Reading MD: AMANDA ZAMUDIO MD    Measurements  Intervals                                Axis  Rate:       71                           P:          88  ID:         [id number]                          QRS:        49  QRSD:       121                          T:          -3  QT:         375  QTc:        408    Interpretive Statements  Sinus rhythm  Atrial premature complexes  Right bundle branch block  Compared to ECG 2025 12:15:52  Atrial premature complex(es) now present  Right bundle-branch block now present  Electronically Signed On 2025 12:27:32 PST by AMANDA ZAMUDIO MD     POC CoV-2, FLU A/B, RSV by PCR    Collection Time: 25  1:08 PM   Result Value Ref Range    POC Influenza A RNA, PCR Negative Negative    POC Influenza B RNA, PCR Negative Negative    POC RSV, by PCR Negative Negative    POC SARS-CoV-2, PCR NotDetected NotDetected   Troponins in two (2) hours    Collection Time: 25  3:05 PM   Result Value Ref Range    Troponin T 52 (H) 6 - 19 ng/L     *Note: Due to a large number of results and/or encounters for the requested time period, some results have not been displayed. A complete set of results can be found in Results Review.       I have independently interpreted this EKG as documented above        RADIOLOGY  I have independently interpreted the diagnostic imaging associated with this visit and am waiting the final reading from the radiologist.   My preliminary interpretation is as follows: No edema    DX-CHEST-PORTABLE (1 VIEW)   Final  Result      No acute cardiac or pulmonary abnormalities are identified.            COURSE & MEDICAL DECISION MAKING    INITIAL ASSESSMENT, COURSE AND PLAN  Care Narrative: 12:25 PM  Patient presents with chest pain.  The nature of her symptoms would be quite atypical for ACS although this is considered, have ordered for troponins, ECG, seems unlikely to be pulmonary embolus she is not tachycardic or hypoxemic has no pleuritic chest pain and is already anticoagulated, consideration as well for pulmonary edema although does not appear to be overtly volume overloaded on exam, no ripping or tearing sensation or neurologic deficits or pulse deficits nature of symptoms does not suggest dissection.  She does have some cough and congestion so consideration for infectious pathology including viral syndrome and pneumonia as well.  Additional consideration for many more GI source such as GERD    5:34 PM  Patient is reevaluated, there is been no return of pain or vomiting.  Discussed all results and patient is comfortable with discharge plan      Interventions  Medications   ondansetron (Zofran) syringe/vial injection 4 mg (4 mg Intravenous Given 2/25/25 1521)   ketorolac (Toradol) 15 MG/ML injection 15 mg (15 mg Intravenous Given 2/25/25 1521)       Measures  CHEST PAIN:   HEART Score for Major Cardiac Events  HEART Score     History: Slightly suspicious  ECG: Normal  Age: 45-64  Risk Factors: 1-2 risk factors  Troponin: 1-3 times normal limit    Heart Score: 3    Total Score   0-3 Points = Low Score, risk of MACE 0.9-1.7%.  4-6 Points = Moderate Score, risk of MACE 12-16.6%  7-10 Points = High Score, risk of MACE 50-65%       PROBLEM LIST    # Chest pain.  At this point no findings of emergent process.  Pain would be quite atypical for ACS, has negative delta troponin, always some chronic elevation in troponin with a negative delta today.  No findings of ischemia on ECG.  X-ray is reassuring without any pneumothorax or  pulmonary edema.  No findings of pneumonia, additional differential was considered as above and patient does have follow-up with primary care on the third.     # Nausea.  Resolved.  Benign abdominal exam      DISPOSITION AND DISCUSSIONS  Barriers to care at this time, including but not limited to:  none .     Prescription drugs considered and/or prescribed:   Considered opiate prescription, but nonnarcotic analgesic is most appropriate  Prescribed   Discharge Medication List as of 2/25/2025  5:39 PM          Clinical decision guidelines/aides heart score 3 with chronically elevated troponin     The patient will return for new or worsening symptoms and is stable at the time of discharge.    The patient is referred to a primary physician for blood pressure management, diabetic screening, and for all other preventative health concerns.        DISPOSITION:  Patient will be discharged home in stable condition.    FOLLOW UP:  Lili Reddy M.D.  [address]  [address]  [facility] [id number] [phone]    Schedule an appointment as soon as possible for a visit         OUTPATIENT MEDICATIONS:  Discharge Medication List as of 2/25/2025  5:39 PM            FINAL DIAGNOSIS  1. Chest pain, unspecified type        2. Nausea               This dictation was created using voice recognition software. The accuracy of the dictation is limited to the abilities of the software. I expect there may be some errors of grammar and possibly content. The nursing notes were reviewed and certain aspects of this information were incorporated into this note.    Electronically signed by: Yanick Posada M.D., 2/25/2025

## 2025-02-26 NOTE — DISCHARGE INSTRUCTIONS
Please take your medications as directed, rest, ensure that you stay well-hydrated and follow-up at your primary care appointment.  Seek more immediate medical attention for any new or persistent chest pain, passing out, difficulty breathing or other concerns

## 2025-02-28 ENCOUNTER — HOSPITAL ENCOUNTER (EMERGENCY)
Facility: MEDICAL CENTER | Age: 50
End: 2025-03-01
Attending: STUDENT IN AN ORGANIZED HEALTH CARE EDUCATION/TRAINING PROGRAM
Payer: MEDICAID

## 2025-02-28 DIAGNOSIS — M54.41 ACUTE RIGHT-SIDED LOW BACK PAIN WITH RIGHT-SIDED SCIATICA: ICD-10-CM

## 2025-02-28 PROCEDURE — 99284 EMERGENCY DEPT VISIT MOD MDM: CPT

## 2025-02-28 RX ORDER — OXYCODONE AND ACETAMINOPHEN 10; 325 MG/1; MG/1
1 TABLET ORAL ONCE
Refills: 0 | Status: COMPLETED | OUTPATIENT
Start: 2025-03-01 | End: 2025-03-01

## 2025-02-28 RX ORDER — CYCLOBENZAPRINE HCL 10 MG
10 TABLET ORAL ONCE
Status: COMPLETED | OUTPATIENT
Start: 2025-03-01 | End: 2025-03-01

## 2025-02-28 RX ORDER — IBUPROFEN 600 MG/1
600 TABLET, FILM COATED ORAL ONCE
Status: COMPLETED | OUTPATIENT
Start: 2025-03-01 | End: 2025-03-01

## 2025-02-28 RX ORDER — LIDOCAINE 4 G/G
1 PATCH TOPICAL EVERY 24 HOURS
Status: DISCONTINUED | OUTPATIENT
Start: 2025-03-01 | End: 2025-03-01 | Stop reason: HOSPADM

## 2025-02-28 ASSESSMENT — FIBROSIS 4 INDEX: FIB4 SCORE: 0.76

## 2025-02-28 ASSESSMENT — PAIN DESCRIPTION - PAIN TYPE: TYPE: ACUTE PAIN

## 2025-03-01 VITALS
DIASTOLIC BLOOD PRESSURE: 58 MMHG | HEIGHT: 62 IN | WEIGHT: 260.14 LBS | SYSTOLIC BLOOD PRESSURE: 100 MMHG | BODY MASS INDEX: 47.87 KG/M2 | HEART RATE: 62 BPM | RESPIRATION RATE: 16 BRPM | TEMPERATURE: 98.1 F | OXYGEN SATURATION: 90 %

## 2025-03-01 PROCEDURE — 700101 HCHG RX REV CODE 250: Mod: UD | Performed by: STUDENT IN AN ORGANIZED HEALTH CARE EDUCATION/TRAINING PROGRAM

## 2025-03-01 PROCEDURE — A9270 NON-COVERED ITEM OR SERVICE: HCPCS | Mod: UD | Performed by: STUDENT IN AN ORGANIZED HEALTH CARE EDUCATION/TRAINING PROGRAM

## 2025-03-01 PROCEDURE — 96372 THER/PROPH/DIAG INJ SC/IM: CPT

## 2025-03-01 PROCEDURE — 700102 HCHG RX REV CODE 250 W/ 637 OVERRIDE(OP): Mod: UD | Performed by: STUDENT IN AN ORGANIZED HEALTH CARE EDUCATION/TRAINING PROGRAM

## 2025-03-01 PROCEDURE — 700111 HCHG RX REV CODE 636 W/ 250 OVERRIDE (IP): Mod: JZ,UD | Performed by: STUDENT IN AN ORGANIZED HEALTH CARE EDUCATION/TRAINING PROGRAM

## 2025-03-01 RX ORDER — LIDOCAINE 4 G/G
1 PATCH TOPICAL EVERY 24 HOURS
Qty: 10 PATCH | Refills: 0 | Status: SHIPPED | OUTPATIENT
Start: 2025-03-01 | End: 2025-03-19

## 2025-03-01 RX ORDER — MORPHINE SULFATE 4 MG/ML
6 INJECTION INTRAVENOUS ONCE
Status: COMPLETED | OUTPATIENT
Start: 2025-03-01 | End: 2025-03-01

## 2025-03-01 RX ORDER — CYCLOBENZAPRINE HCL 10 MG
10 TABLET ORAL 3 TIMES DAILY PRN
Qty: 30 TABLET | Refills: 0 | Status: SHIPPED | OUTPATIENT
Start: 2025-03-01 | End: 2025-03-19

## 2025-03-01 RX ADMIN — LIDOCAINE PAIN RELIEF 1 PATCH: 560 PATCH TOPICAL at 00:02

## 2025-03-01 RX ADMIN — MORPHINE SULFATE 6 MG: 4 INJECTION, SOLUTION INTRAMUSCULAR; INTRAVENOUS at 01:31

## 2025-03-01 RX ADMIN — OXYCODONE AND ACETAMINOPHEN 1 TABLET: 10; 325 TABLET ORAL at 00:01

## 2025-03-01 RX ADMIN — IBUPROFEN 600 MG: 600 TABLET, FILM COATED ORAL at 00:01

## 2025-03-01 RX ADMIN — CYCLOBENZAPRINE 10 MG: 10 TABLET, FILM COATED ORAL at 00:01

## 2025-03-01 ASSESSMENT — PAIN DESCRIPTION - PAIN TYPE
TYPE: ACUTE PAIN
TYPE: ACUTE PAIN

## 2025-03-01 NOTE — ED PROVIDER NOTES
"ER Provider Note    Scribed for Julee Encarnacion D.o. by Arlyn Bang. 2/28/2025  11:24 PM    Primary Care Provider: Lili Reddy M.D.    CHIEF COMPLAINT   Chief Complaint   Patient presents with    Leg Pain    Back Pain     Pt brought in by ems complaints of low back pain radiating to right leg, denies any hx of trauma     EXTERNAL RECORDS REVIEWED  Other patient was in [facility] ED on 2/25/2025 for chest and leg pain     HPI/ROS  LIMITATION TO HISTORY   Select: : None  OUTSIDE HISTORIAN(S):  None     Lorene Haro is a 49 y.o. female who presents to the ED complaining of lower back pain onset twelve hours ago. The patient notes that she went to bed last night and notes her right leg felt difficult to move. She adds the pain was worsened today which prompted her to come into the ED. She describes her pain as \"sharp.\" She confirms pain radiates into her right leg. She denies any bowel issues, numbness and tingling, urinary or bladder incontinence, or any trauma to her back. She add she has chronic neck pain for which she sees spine doctor. She notes she took Tylenol for her symptoms. She reports a history of A-Fib and COPD.  Denies saddle anesthesia she confirms a past surgical history of  her left foot in October of 2024. She denies any IV drug history.      PAST MEDICAL HISTORY  Past Medical History:   Diagnosis Date    A-fib (HCC)     MONI (acute kidney injury) (HCC) 08/09/2023    Asthma     Bipolar 2 disorder (HCC)     Chickenpox     Chronic obstructive pulmonary disease (HCC)     Congestive heart failure (HCC)     Hypertension     On home oxygen therapy     Other psychological stress     Schizophrenic disorder ([facility])     Yeast infection of the skin 04/01/2021       SURGICAL HISTORY  Past Surgical History:   Procedure Laterality Date    ORIF, ANKLE Left 11/2/2024    Procedure: ORIF, ANKLE- TALUS;  Surgeon: Roman Rosa M.D.;  Location: SURGERY [facility];  Service: Orthopedics    " CHOLECYSTECTOMY      COLECTOMY      HYSTERECTOMY LAPAROSCOPY      KNEE ARTHROSCOPY Left        FAMILY HISTORY  Family History   Problem Relation Age of Onset    No Known Problems Mother     Cancer Sister        SOCIAL HISTORY   reports that she quit smoking about 2 years ago. Her smoking use included cigarettes. She has never used smokeless tobacco. She reports that she does not currently use alcohol. She reports that she does not use drugs.    CURRENT MEDICATIONS  Previous Medications    ADVAIR DISKUS 100-50 MCG/ACT AEROSOL POWDER, BREATH ACTIVATED    Inhale 1 Puff 2 times a day.    APIXABAN (ELIQUIS) 5MG TAB    Take 1 Tablet by mouth 2 times a day.    ARIPIPRAZOLE (ABILIFY) 30 MG TABLET    Take 1 Tablet by mouth every morning. Indications: Major Depressive Disorder    ASCORBIC ACID (VITAMIN C) 500 MG TABLET    Take 500 mg by mouth every day.    ATORVASTATIN (LIPITOR) 20 MG TAB    Take 1 Tablet by mouth at bedtime.    CALCIUM CARBONATE (OS-ABELARDO 500) 500 MG TAB    Take 1 Tablet by mouth 2 times a day with meals.    CYCLOBENZAPRINE (FLEXERIL) 10 MG TAB    Take 1 Tablet by mouth 3 times a day as needed for Moderate Pain or Muscle Spasms.    FERROUS SULFATE 325 (65 FE) MG TABLET    Take 325 mg by mouth every 48 hours.    FLUTICASONE (FLONASE) 50 MCG/ACT NASAL SPRAY    Administer 2 Sprays into each nostril every morning.    GABAPENTIN (NEURONTIN) 300 MG CAP    Take 1 Capsule by mouth 3 times a day.    HOME CARE OXYGEN    Inhale 2 L/min every evening. DME = Preferred    HYDROXYZINE HCL (ATARAX) 25 MG TAB    Take 1 Tablet by mouth 3 times a day as needed for Itching.    KETOCONAZOLE (NIZORAL) 2 % CREAM    Apply 1 Application topically 2 times a day.    LIDOCAINE (LIDODERM) 5 % PATCH    Place 1 Patch on the skin every 12 hours.    METOPROLOL SR (TOPROL XL) 50 MG TABLET SR 24 HR    Take 1 Tablet by mouth every day.    MONTELUKAST (SINGULAIR) 10 MG TAB    Take 1 Tablet by mouth every evening.    NICOTINE (NICODERM) 14 MG/24HR  "PATCH 24 HR    Place 1 Patch on the skin every 24 hours.    NICOTINE (NICODERM) 21 MG/24HR PATCH 24 HR    Place 1 Patch on the skin every 24 hours.    NICOTINE (NICODERM) 7 MG/24HR PATCH 24 HR        POLYETHYLENE GLYCOL/LYTES (MIRALAX) PACK    Take 1 Packet by mouth 1 time a day as needed (if no bowel movement in last 2 days).    SENNA-DOCUSATE (PERICOLACE OR SENOKOT S) 8.6-50 MG TAB    Take 2 Tablets by mouth every evening.    VITAMIN D, CHOLECALCIFEROL, (CHOLECALCIFEROL) 25 MCG (1000 UT) TAB    Take 2 Tablets by mouth every day.       ALLERGIES  Amoxicillin and Penicillin g    PHYSICAL EXAM  /74   Pulse 64   Temp 36.5 °C (97.7 °F) (Temporal)   Resp 16   Ht 1.575 m (5' 2\")   Wt 118 kg (260 lb 2.3 oz)   LMP  (LMP Unknown)   SpO2 95%   BMI 47.58 kg/m²   Pulse oximetry interpretation: I interpret the pulse oximetry as normal.  Constitutional: Awake and alert. No acute distress.  Head: NCAT.  HEENT: Normal Conjunctiva.  Neck: Grossly normal range of motion. Airway midline.  Cardiovascular: Normal heart rate, Normal rhythm.  Thorax & Lungs: No respiratory distress. Clear to Auscultation bilaterally.  Abdomen: Normal inspection. Nontender. Nondistended.  No pulsatile mass  Skin: No obvious rash.  Back: No midline tenderness, No CVA tenderness, paraspinal tenderness  Musculoskeletal: No obvious deformity. Moves all extremities Well. Right buttock tenderness. Muscle strength 5/5 with flexion, extension, and ankle dorsiflexion.   Neurologic: A&Ox4.   Psychiatric: Mood and affect are appropriate for situation.      COURSE & MEDICAL DECISION MAKING     ASSESSMENT, COURSE AND PLAN  Care Narrative:     11:30 PM  49 y.o. female presents with right sided back and leg pain described as stabbing  Afebrile and normal vitals   No red flag signs or symptoms of back pain as reported in the history.    pertinent exam findings include right buttock pain and paraspinal tenderness her strength are intact in the lower " extremities to testing,.  Differentials considered but not exhaustive list including sciatica, low back pain, epidural abscess, cauda equina, fracture    No red flag signs or symptoms, no imaging indicated at this time.  Considered urinalysis and other labs but no history of kidney stone, no CVA tenderness.  She is medicated for back pain and reassessed.  Pain is improving and she can use her walker.  Instructed to follow-up with her spine surgeon which she sees for her neck.  She also has a primary care appointment Monday and advised to discuss her back pain further to consider advanced imaging referral to PT.    2:22 AM - I reevaluated the patient at bedside. The patient informs me they feel improved following morphine 4 mg/ml 6 mg injection, Flexeril 10 mg tablet, Asper flex 4% patch-1 patch, Percocet  1 tablet, Motrin 600 mg tablet administration. I discussed plan for discharge and follow up as outlined below. The patient is stable for discharge at this time and will return for any new or worsening symptoms. Patient verbalizes understanding and support with my plan for discharge.     ADDITIONAL PROBLEM LIST  obesity    DISPOSITION AND DISCUSSIONS    I have discussed management of the patient with the following physicians and JAY's:  None     Discussion of management with other QHP or appropriate source(s): None     Escalation of care considered, and ultimately not performed: acute inpatient care management, however at this time, the patient is most appropriate for outpatient management.    Barriers to care at this time, including but not limited to:  None noted  .     Decision tools and prescription drugs considered including, but not limited to:  None .    The patient will return for new or worsening symptoms and is stable at the time of discharge.      DISPOSITION:  Patient will be discharged home in stable condition.    FOLLOW UP:  Lili Reddy M.D.  [address]  [address]  [facility]  [id number]  [phone]    Schedule an appointment as soon as possible for a visit         OUTPATIENT MEDICATIONS:  New Prescriptions    CYCLOBENZAPRINE (FLEXERIL) 10 MG TAB    Take 1 Tablet by mouth 3 times a day as needed for Moderate Pain.    LIDOCAINE (ASPERFLEX) 4 % PATCH    Place 1 Patch on the skin every 24 hours.         FINAL DIAGNOSIS  1. Acute right-sided low back pain with right-sided sciatica       Arlyn HARRIS (Ade), am scribing for, and in the presence of, Julee Encarnacion D.O..    Electronically signed by: Arlyn Bang (Ade), 3/1/2025    Julee HARRIS D.O. personally performed the services described in this documentation, as scribed by Arlyn Bang in my presence, and it is both accurate and complete.       The note accurately reflects work and decisions made by me.  Julee Encarnacion D.O.  3/1/2025  3:09 AM

## 2025-03-01 NOTE — ED NOTES
Pt given discharge instructions/prescription sent to pharm of choosing/ home care instructions explained, pt verbalized understanding of instructions given/pt understands the importance of follow up, pt ambulatory to ER lobby with MTM paper for transport.

## 2025-03-01 NOTE — ED TRIAGE NOTES
Chief Complaint   Patient presents with    Leg Pain    Back Pain     Pt brought in by ems complaints of low back pain radiating to right leg, denies any hx of trauma   Pt denies urinary symptoms, denies chest pain or sob.  Pt is ambulatory by a walker. Lives in a group home [facility].    Vitals:    02/28/25 2231   BP: 122/62   Pulse: 61   Resp: 16   Temp: 36.5 °C (97.7 °F)   SpO2: 95%

## 2025-03-03 ENCOUNTER — APPOINTMENT (OUTPATIENT)
Dept: RADIOLOGY | Facility: MEDICAL CENTER | Age: 50
End: 2025-03-03
Attending: EMERGENCY MEDICINE
Payer: MEDICAID

## 2025-03-03 ENCOUNTER — HOSPITAL ENCOUNTER (EMERGENCY)
Facility: MEDICAL CENTER | Age: 50
End: 2025-03-03
Attending: EMERGENCY MEDICINE
Payer: MEDICAID

## 2025-03-03 VITALS
OXYGEN SATURATION: 96 % | RESPIRATION RATE: 16 BRPM | TEMPERATURE: 97.2 F | BODY MASS INDEX: 49.28 KG/M2 | HEIGHT: 61 IN | SYSTOLIC BLOOD PRESSURE: 110 MMHG | DIASTOLIC BLOOD PRESSURE: 54 MMHG | HEART RATE: 71 BPM | WEIGHT: 261 LBS

## 2025-03-03 DIAGNOSIS — R05.1 ACUTE COUGH: ICD-10-CM

## 2025-03-03 DIAGNOSIS — R09.81 NASAL CONGESTION: ICD-10-CM

## 2025-03-03 LAB
ANION GAP SERPL CALC-SCNC: 9 MMOL/L (ref 7–16)
BASOPHILS # BLD AUTO: 0.1 % (ref 0–1.8)
BASOPHILS # BLD: 0.01 K/UL (ref 0–0.12)
BUN SERPL-MCNC: 15 MG/DL (ref 8–22)
CALCIUM SERPL-MCNC: 9.2 MG/DL (ref 8.5–10.5)
CHLORIDE SERPL-SCNC: 106 MMOL/L (ref 96–112)
CO2 SERPL-SCNC: 28 MMOL/L (ref 20–33)
CREAT SERPL-MCNC: 0.91 MG/DL (ref 0.5–1.4)
EOSINOPHIL # BLD AUTO: 0.08 K/UL (ref 0–0.51)
EOSINOPHIL NFR BLD: 1 % (ref 0–6.9)
ERYTHROCYTE [DISTWIDTH] IN BLOOD BY AUTOMATED COUNT: 50.2 FL (ref 35.9–50)
FLUAV RNA SPEC QL NAA+PROBE: NEGATIVE
FLUBV RNA SPEC QL NAA+PROBE: NEGATIVE
GFR SERPLBLD CREATININE-BSD FMLA CKD-EPI: 76 ML/MIN/1.73 M 2
GLUCOSE SERPL-MCNC: 96 MG/DL (ref 65–99)
HCT VFR BLD AUTO: 39.4 % (ref 37–47)
HGB BLD-MCNC: 12 G/DL (ref 12–16)
IMM GRANULOCYTES # BLD AUTO: 0.03 K/UL (ref 0–0.11)
IMM GRANULOCYTES NFR BLD AUTO: 0.4 % (ref 0–0.9)
LYMPHOCYTES # BLD AUTO: 1.5 K/UL (ref 1–4.8)
LYMPHOCYTES NFR BLD: 19 % (ref 22–41)
MCH RBC QN AUTO: 26.1 PG (ref 27–33)
MCHC RBC AUTO-ENTMCNC: 30.5 G/DL (ref 32.2–35.5)
MCV RBC AUTO: 85.7 FL (ref 81.4–97.8)
MONOCYTES # BLD AUTO: 0.42 K/UL (ref 0–0.85)
MONOCYTES NFR BLD AUTO: 5.3 % (ref 0–13.4)
NEUTROPHILS # BLD AUTO: 5.86 K/UL (ref 1.82–7.42)
NEUTROPHILS NFR BLD: 74.2 % (ref 44–72)
NRBC # BLD AUTO: 0 K/UL
NRBC BLD-RTO: 0 /100 WBC (ref 0–0.2)
NT-PROBNP SERPL IA-MCNC: 585 PG/ML (ref 0–125)
PLATELET # BLD AUTO: 214 K/UL (ref 164–446)
PMV BLD AUTO: 10.9 FL (ref 9–12.9)
POTASSIUM SERPL-SCNC: 4.5 MMOL/L (ref 3.6–5.5)
RBC # BLD AUTO: 4.6 M/UL (ref 4.2–5.4)
RSV RNA SPEC QL NAA+PROBE: NEGATIVE
SARS-COV-2 RNA RESP QL NAA+PROBE: NOTDETECTED
SODIUM SERPL-SCNC: 143 MMOL/L (ref 135–145)
WBC # BLD AUTO: 7.9 K/UL (ref 4.8–10.8)

## 2025-03-03 PROCEDURE — 36415 COLL VENOUS BLD VENIPUNCTURE: CPT

## 2025-03-03 PROCEDURE — 700101 HCHG RX REV CODE 250: Mod: UD | Performed by: EMERGENCY MEDICINE

## 2025-03-03 PROCEDURE — 700102 HCHG RX REV CODE 250 W/ 637 OVERRIDE(OP): Mod: UD | Performed by: EMERGENCY MEDICINE

## 2025-03-03 PROCEDURE — 83880 ASSAY OF NATRIURETIC PEPTIDE: CPT

## 2025-03-03 PROCEDURE — 80048 BASIC METABOLIC PNL TOTAL CA: CPT

## 2025-03-03 PROCEDURE — 71045 X-RAY EXAM CHEST 1 VIEW: CPT

## 2025-03-03 PROCEDURE — 99284 EMERGENCY DEPT VISIT MOD MDM: CPT

## 2025-03-03 PROCEDURE — A9270 NON-COVERED ITEM OR SERVICE: HCPCS | Mod: UD | Performed by: EMERGENCY MEDICINE

## 2025-03-03 PROCEDURE — 85025 COMPLETE CBC W/AUTO DIFF WBC: CPT

## 2025-03-03 PROCEDURE — 0241U HCHG SARS-COV-2 COVID-19 NFCT DS RESP RNA 4 TRGT ED POC: CPT

## 2025-03-03 RX ORDER — BENZONATATE 100 MG/1
100 CAPSULE ORAL 3 TIMES DAILY PRN
Qty: 60 CAPSULE | Refills: 0 | Status: SHIPPED | OUTPATIENT
Start: 2025-03-03

## 2025-03-03 RX ORDER — LIDOCAINE 4 G/G
1 PATCH TOPICAL EVERY 24 HOURS
Status: DISCONTINUED | OUTPATIENT
Start: 2025-03-03 | End: 2025-03-03 | Stop reason: HOSPADM

## 2025-03-03 RX ORDER — CYCLOBENZAPRINE HCL 10 MG
10 TABLET ORAL ONCE
Status: COMPLETED | OUTPATIENT
Start: 2025-03-03 | End: 2025-03-03

## 2025-03-03 RX ADMIN — LIDOCAINE PAIN RELIEF 1 PATCH: 560 PATCH TOPICAL at 11:58

## 2025-03-03 RX ADMIN — CYCLOBENZAPRINE 10 MG: 10 TABLET, FILM COATED ORAL at 11:58

## 2025-03-03 ASSESSMENT — LIFESTYLE VARIABLES: DO YOU DRINK ALCOHOL: NO

## 2025-03-03 ASSESSMENT — FIBROSIS 4 INDEX: FIB4 SCORE: 0.76

## 2025-03-03 NOTE — ED PROVIDER NOTES
ED Provider Note    CHIEF COMPLAINT  Chief Complaint   Patient presents with    Congestion    Cough    Sore Throat       EXTERNAL RECORDS REVIEWED  Inpatient Notes ER note 2025 reviewed.    HPI/ROS  LIMITATION TO HISTORY   Select: : None  OUTSIDE HISTORIAN(S):  None    Lorene Haro is a 49 y.o. female who presents to the ER with roughly 24 hours of nasal congestion and cough.  Currently living at [facility].  Due to persistent cough staff elected to have patient transferred to this facility for further care workup.  Past medical history reviewed with patient as document below.  She does have chronic A-fib and is on anticoagulation.  Additionally has known CHF.  To that she is otherwise been feeling relatively well.  She also does add that she has chronic right-sided sciatica which is currently mildly present but not seemingly different than baseline.  No recent falls.    PAST MEDICAL HISTORY   has a past medical history of A-fib ([facility]), MONI (acute kidney injury) ([facility]) (2023), Asthma, Bipolar 2 disorder ([facility]), Chickenpox, Chronic obstructive pulmonary disease (HCC), Congestive heart failure (HCC), Hypertension, On home oxygen therapy, Other psychological stress, Schizophrenic disorder ([facility]), and Yeast infection of the skin (2021).    SURGICAL HISTORY   has a past surgical history that includes hysterectomy laparoscopy; colectomy; cholecystectomy; knee arthroscopy (Left); and orif, ankle (Left, 2024).    FAMILY HISTORY  Family History   Problem Relation Age of Onset    No Known Problems Mother     Cancer Sister        SOCIAL HISTORY  Social History     Tobacco Use    Smoking status: Former     Current packs/day: 0.00     Types: Cigarettes     Quit date: 10/12/2022     Years since quittin.3    Smokeless tobacco: Never   Vaping Use    Vaping status: Never Used   Substance and Sexual Activity    Alcohol use: Not Currently    Drug use: Never    Sexual activity: Not Currently  "      CURRENT MEDICATIONS  Home Medications       Reviewed by Amber Dahl R.N. (Registered Nurse) on 03/03/25 at [facility]  Med List Status: Partial     Medication Last Dose Status   ADVAIR DISKUS 100-50 MCG/ACT AEROSOL POWDER, BREATH ACTIVATED  Active   apixaban (ELIQUIS) 5mg Tab  Active   aripiprazole (ABILIFY) 30 MG tablet  Active   ascorbic acid (VITAMIN C) 500 MG tablet  Active   atorvastatin (LIPITOR) 20 MG Tab  Active   calcium carbonate (OS-ABELARDO 500) 500 MG Tab  Active   cyclobenzaprine (FLEXERIL) 10 mg Tab  Active   cyclobenzaprine (FLEXERIL) 10 mg Tab  Active   ferrous sulfate 325 (65 Fe) MG tablet  Active   fluticasone (FLONASE) 50 MCG/ACT nasal spray  Active   gabapentin (NEURONTIN) 300 MG Cap  Active   Home Care Oxygen  Active   hydrOXYzine HCl (ATARAX) 25 MG Tab  Active   ketoconazole (NIZORAL) 2 % Cream  Active   lidocaine (ASPERFLEX) 4 % Patch  Active   lidocaine (LIDODERM) 5 % Patch  Active   metoprolol SR (TOPROL XL) 50 MG TABLET SR 24 HR  Active   montelukast (SINGULAIR) 10 MG Tab  Active   nicotine (NICODERM) 14 MG/24HR PATCH 24 HR  Active   nicotine (NICODERM) 21 MG/24HR PATCH 24 HR  Active   nicotine (NICODERM) 7 MG/24HR PATCH 24 HR  Active   polyethylene glycol/lytes (MIRALAX) Pack  Active   senna-docusate (PERICOLACE OR SENOKOT S) 8.6-50 MG Tab  Active   Vitamin D, Cholecalciferol, (CHOLECALCIFEROL) 25 MCG (1000 UT) Tab  Active                    ALLERGIES  Allergies   Allergen Reactions    Amoxicillin Rash and Itching     Tolerates cephalosporins, pt reports that she gets a rash all over her body and gets itchy    Penicillin G Rash and Itching     pt reports that she gets a rash all over her body and gets itchy       PHYSICAL EXAM  VITAL SIGNS: /54   Pulse 71   Temp 36.2 °C (97.2 °F) (Temporal)   Resp 16   Ht 1.549 m (5' 1\")   Wt 118 kg (261 lb)   LMP  (LMP Unknown)   SpO2 96%   BMI 49.32 kg/m²       Pulse ox interpretation: I interpret this pulse ox as normal.  Constitutional: " Alert in no apparent distress.  HENT: No signs of trauma, Bilateral external ears normal, Nose normal.   Eyes: Pupils are equal and reactive  Neck: Normal range of motion, No tenderness, Supple  Cardiovascular: Regular rate and rhythm, no murmurs.   Thorax & Lungs: Normal breath sounds, No respiratory distress  Abdomen: Bowel sounds normal, Soft, No tenderness, overweight  Skin: Warm, Dry, No erythema, No rash.   Musculoskeletal: Good range of motion in all major joints. No tenderness to palpation or major deformities noted.   Neurologic: Alert , Normal motor function, Normal sensory function, No focal deficits noted.   Psychiatric: Affect normal, Judgment normal, Mood normal.         EKG/LABS  Results for orders placed or performed during the hospital encounter of 03/03/25   POC CoV-2, FLU A/B, RSV by PCR    Collection Time: 03/03/25 10:17 AM   Result Value Ref Range    POC Influenza A RNA, PCR Negative Negative    POC Influenza B RNA, PCR Negative Negative    POC RSV, by PCR Negative Negative    POC SARS-CoV-2, PCR NotDetected NotDetected   CBC WITH DIFFERENTIAL    Collection Time: 03/03/25 10:35 AM   Result Value Ref Range    WBC 7.9 4.8 - 10.8 K/uL    RBC 4.60 4.20 - 5.40 M/uL    Hemoglobin 12.0 12.0 - 16.0 g/dL    Hematocrit 39.4 37.0 - 47.0 %    MCV 85.7 81.4 - 97.8 fL    MCH 26.1 (L) 27.0 - 33.0 pg    MCHC 30.5 (L) 32.2 - 35.5 g/dL    RDW 50.2 (H) 35.9 - 50.0 fL    Platelet Count 214 164 - 446 K/uL    MPV 10.9 9.0 - 12.9 fL    Neutrophils-Polys 74.20 (H) 44.00 - 72.00 %    Lymphocytes 19.00 (L) 22.00 - 41.00 %    Monocytes 5.30 0.00 - 13.40 %    Eosinophils 1.00 0.00 - 6.90 %    Basophils 0.10 0.00 - 1.80 %    Immature Granulocytes 0.40 0.00 - 0.90 %    Nucleated RBC 0.00 0.00 - 0.20 /100 WBC    Neutrophils (Absolute) 5.86 1.82 - 7.42 K/uL    Lymphs (Absolute) 1.50 1.00 - 4.80 K/uL    Monos (Absolute) 0.42 0.00 - 0.85 K/uL    Eos (Absolute) 0.08 0.00 - 0.51 K/uL    Baso (Absolute) 0.01 0.00 - 0.12 K/uL     Immature Granulocytes (abs) 0.03 0.00 - 0.11 K/uL    NRBC (Absolute) 0.00 K/uL   BASIC METABOLIC PANEL    Collection Time: 03/03/25 10:35 AM   Result Value Ref Range    Sodium 143 135 - 145 mmol/L    Potassium 4.5 3.6 - 5.5 mmol/L    Chloride 106 96 - 112 mmol/L    Co2 28 20 - 33 mmol/L    Glucose 96 65 - 99 mg/dL    Bun 15 8 - 22 mg/dL    Creatinine 0.91 0.50 - 1.40 mg/dL    Calcium 9.2 8.5 - 10.5 mg/dL    Anion Gap 9.0 7.0 - 16.0   proBrain Natriuretic Peptide, NT    Collection Time: 03/03/25 10:35 AM   Result Value Ref Range    NT-proBNP 585 (H) 0 - 125 pg/mL   ESTIMATED GFR    Collection Time: 03/03/25 10:35 AM   Result Value Ref Range    GFR (CKD-EPI) 76 >60 mL/min/1.73 m 2     *Note: Due to a large number of results and/or encounters for the requested time period, some results have not been displayed. A complete set of results can be found in Results Review.       RADIOLOGY/PROCEDURES   I have independently interpreted the diagnostic imaging associated with this visit and am waiting the final reading from the radiologist.   My preliminary interpretation is as follows: No consolidative process    Radiologist interpretation:  DX-CHEST-PORTABLE (1 VIEW)   Final Result      1.  Loop recorder is present.   2.  Minimal stranding in the retrocardiac left lower lobe consistent with subsegmental atelectasis or fibrotic scar.   3.  No change from prior exam.          COURSE & MEDICAL DECISION MAKING    ASSESSMENT, COURSE AND PLAN  Care Narrative: 49-year-old female presented the emerged part with above presentation.  Will complete infectious workup as well as CHF workup given her history    DISPOSITION AND DISCUSSIONS  I have discussed management of the patient with the following physicians and JAY's: None    Patient presenting with 24 hours of nasal congestion and cough.  Sent from group home.  Workup as above without any significant changes from baseline.  Chest x-ray without any consolidative process.  Viral panel  negative.  Patient clinically stable not hypoxic or any respiratory distress.  At this point I will have the patient continue her regular medications and be discharged from the ER to home.  She can be followed up by her outpatient PCP.    FINAL DIAGNOSIS  1. Nasal congestion    2. Acute cough         Electronically signed by: Juan Ann M.D., 3/3/2025 10:23 AM

## 2025-03-03 NOTE — ED NOTES
BREAK RN: Pt provided discharge instructions and follow-up information. Pt verbalized understanding and all questions answered. Pt ambulated from ED with steady gait using FWW carrying all belongings. MTM arranged by  for transport back to group home.

## 2025-03-03 NOTE — ED NOTES
Pt BIB EMS From home.  Pt seen on Thursday for right leg and hip pain, dx with sciatica, sent home with medications - has not taken any medications d/t hasn't picked medications up because of the weekend.  Pt comes in today with continued pain to right leg and hip but now c/o congestion and cough for the last 2 days.  Pt was given 1000 mg of tylenol and 600 mg of ibuprofen by EMS.  ERP to see.

## 2025-03-07 ENCOUNTER — OFFICE VISIT (OUTPATIENT)
Dept: SLEEP MEDICINE | Facility: MEDICAL CENTER | Age: 50
End: 2025-03-07
Payer: MEDICAID

## 2025-03-07 VITALS
SYSTOLIC BLOOD PRESSURE: 128 MMHG | WEIGHT: 260.8 LBS | HEIGHT: 62 IN | HEART RATE: 62 BPM | DIASTOLIC BLOOD PRESSURE: 84 MMHG | OXYGEN SATURATION: 96 % | BODY MASS INDEX: 47.99 KG/M2 | RESPIRATION RATE: 18 BRPM

## 2025-03-07 DIAGNOSIS — Z78.9 LIVES IN GROUP HOME: ICD-10-CM

## 2025-03-07 DIAGNOSIS — G47.33 OSA (OBSTRUCTIVE SLEEP APNEA): Primary | ICD-10-CM

## 2025-03-07 DIAGNOSIS — E66.2 OBESITY HYPOVENTILATION SYNDROME (HCC): ICD-10-CM

## 2025-03-07 PROCEDURE — 3074F SYST BP LT 130 MM HG: CPT

## 2025-03-07 PROCEDURE — 99214 OFFICE O/P EST MOD 30 MIN: CPT

## 2025-03-07 PROCEDURE — 99212 OFFICE O/P EST SF 10 MIN: CPT

## 2025-03-07 PROCEDURE — 3079F DIAST BP 80-89 MM HG: CPT

## 2025-03-07 ASSESSMENT — FIBROSIS 4 INDEX: FIB4 SCORE: 0.92

## 2025-03-07 NOTE — PROGRESS NOTES
[facility] Follow-up Visit    CC:   Chief Complaint   Patient presents with    Follow-Up     Last Office Visit 11/7/2024 with Maureen SHRESTHA   iVAPS  Target VA: 5.6, Tgt VA: 18, EPAP: 16, PS: 4/14, Max pressure 30 cmH2O,               HPI:    Lorene Haro is a 49 y.o.female present today for ongoing management of  KATHIA, OHS.  Patient is also followed by pulmonary for asthma-COPD overlap syndrome and restrictive lung disease.  Past medical history is also significant for severe obesity, schizophrenia, respiratory failure, CHF, atrial fibrillation, cardiomegaly. Patient lives in a group home and caregiver is present at today's visit.  Patient was last study results by sleep medicine 11/7/24 by Maureen Archer. Patient uses nocturnal O2 2LPM nightly which she has used for many years.  Patient is a relatively poor historian regarding past medical history and is unsure of previous recommendations for her sleep apnea and OHS.  She has never been on a PAP machine of any kind that she knows of. Generally sleep is minimally restorative.  Chart review indicates that patient has had to pull polysomnograms which showed nocturnal desaturation as well as KATHIA.  Patient has had VAPS titration is on multiple occasions as well with difficulty achieving optimal control of respiratory events.  Most recent study was performed 6/26/2024 for dedicated VAPS titration study.  Recommendations were made and machine was ordered.  Patient does not remember discussing study results.  Results were reviewed with patient as well as recommendations.  Patient has not heard from [employer] company regarding iVAPS machine. This was ordered in November.     DME provider: [facility]  Device: none, oxygen   Snoring: yes  Dry mouth: sometimes, does not use humidifier   Some trouble falling asleep but stays asleep for the most part.       Sleep History:  12/11/18 - PSG .1  Severe nocturnal desaturation - marina saturation 79 % - saturations  <90% for 50.6% of the diagnostic recording. Recommend the patient return for a dedicated full night positive airway pressure titration.  Consider IVAPS.  2/24/19 - PSG VAPS titration, ineffective AVAPS titration. No recommendations can be extrapolated from the study results.   8/8/19 - OPO on AVAPS EPAP 11 cm IPAP 25 cm O2 at 2L/min. The total analyzed time was 2 hrs 32 min. O2 Sat. marina was 88% and mean O2 sat was 90 % and baseline O2 at 92%  7/3/22 - PSG VAPS titration, Rec: AVAPS EPAP 11 pressure support 4-14, backup rate 14 breaths/min, TI 1, rise time 1, tidal volume 450 mL.  6/26/24 - PSG VAPS titration. Rec:  iVAPS  Target VA: 5.6, Tgt NE: 18, EPAP: 16, PS: 4/14, Max pressure 30 cmH2O, Height: 155cm , Rise Time: 300, Ti min: 0.3, Ti max: 2.0, Trigger: High, and Cycle: medium.       Patient Active Problem List    Diagnosis Date Noted    BMI 50.0-59.9, adult ([facility]) 11/02/2024    Talus fracture 10/31/2024    Closed left ankle fracture, initial encounter 10/31/2024    Cirrhosis ([facility]) 09/21/2024    COPD exacerbation ([facility]) 09/20/2024    Acute on chronic systolic heart failure ([facility]) 09/20/2024    Vertigo 08/19/2024    Tobacco abuse 08/10/2024    ACS (acute coronary syndrome) ([facility]) 08/10/2024    Chest pain, likely musculoskeletal 07/30/2024    Advance care planning 07/30/2024    Pulmonary nodules 05/30/2024    Acute hypoxemic respiratory failure ([facility]) 05/20/2024    Blood in stool 05/20/2024    Intractable back pain 03/25/2024    Chronic respiratory failure with hypoxia ([facility]) 03/25/2024    Ligamentum flavum hypertrophy 11/04/2023    Protrusion of lumbar intervertebral disc 11/04/2023    Acute chest pain 10/12/2023    Cellulitis 08/09/2023    Postural dizziness with presyncope 08/01/2023    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    Dependence on nocturnal oxygen therapy 01/26/2023    Oropharyngeal dysphagia 11/08/2022    Stasis dermatitis of both legs 11/07/2022    Shortness of breath +  Somnolence 11/06/2022    Gastroesophageal reflux disease without esophagitis 11/06/2022    Insomnia 11/06/2022    Hyperglycemia 11/06/2022    Acute respiratory failure with hypoxia and hypercapnia ([facility]) 10/24/2022    Class 3 severe obesity in adult ([facility]) 10/24/2022    Asthma-COPD overlap syndrome ([facility]) 10/23/2022    Status post placement of implantable loop recorder 10/23/2022    Atypical pneumonia 10/05/2022    Elevated alkaline phosphatase level 10/01/2022    Restrictive lung disease secondary to obesity 10/01/2022    ?Asthma 09/02/2022    Sinus pause 09/02/2022    Elevated LFTs 09/02/2022    Prediabetes 08/29/2022    Acute respiratory failure due to COVID-19 ([facility]) 08/26/2022    Chest pain, rule out acute myocardial infarction 02/26/2022    Secondary hypercoagulable state ([facility]) 11/24/2021    Pneumonia due to COVID-19 virus 10/07/2021    Pneumonia 10/06/2021    Acute respiratory failure with hypoxia ([facility]) 10/06/2021    Nail overgrowth 08/04/2021    Severe persistent asthma without complication 07/26/2021    Obesity hypoventilation syndrome ([facility]) 07/23/2021    Hyperlipidemia 06/17/2021    Yeast infection of the skin 04/01/2021    Lower extremity edema 04/01/2021    Anxiety 04/01/2021    Blood glucose elevated 03/21/2021    Elevated brain natriuretic peptide (BNP) level 03/19/2021    AF (atrial fibrillation) ([facility]) 03/19/2021    Pain in the chest 01/18/2021    Schizophrenia ([facility]) 01/18/2021    Tobacco abuse, in remission 01/06/2021    Morbid obesity ([facility]) 11/18/2019    Macrocytic anemia 11/18/2019    Acute bronchitis 11/17/2019    Leukocytosis 11/17/2019    Iron deficiency anemia 11/18/2018    KATHIA (obstructive sleep apnea) 11/15/2018    Reactive airway disease with acute exacerbation 11/13/2018    ?possible Schizophrenia vs Bipolar? 11/13/2018    Cardiomegaly 11/13/2018       Past Medical History:   Diagnosis Date    A-fib ([facility])     MONI (acute kidney injury) ([facility]) 08/09/2023    Asthma     Bipolar 2 disorder ([facility])      Chickenpox     Chronic obstructive pulmonary disease (HCC)     Congestive heart failure (HCC)     Hypertension     On home oxygen therapy     Other psychological stress     Schizophrenic disorder (HCC)     Yeast infection of the skin 04/01/2021        Past Surgical History:   Procedure Laterality Date    ORIF, ANKLE Left 11/2/2024    Procedure: ORIF, ANKLE- TALUS;  Surgeon: Roman Rosa M.D.;  Location: SURGERY [facility];  Service: Orthopedics    CHOLECYSTECTOMY      COLECTOMY      HYSTERECTOMY LAPAROSCOPY      KNEE ARTHROSCOPY Left        Family History   Problem Relation Age of Onset    No Known Problems Mother     Cancer Sister        Current Outpatient Medications   Medication Sig Dispense Refill    benzonatate (TESSALON) 100 MG Cap Take 1 Capsule by mouth 3 times a day as needed for Cough. 60 Capsule 0    cyclobenzaprine (FLEXERIL) 10 mg Tab Take 1 Tablet by mouth 3 times a day as needed for Moderate Pain. 30 Tablet 0    lidocaine (ASPERFLEX) 4 % Patch Place 1 Patch on the skin every 24 hours. 10 Patch 0    metoprolol SR (TOPROL XL) 50 MG TABLET SR 24 HR Take 1 Tablet by mouth every day. 90 Tablet 3    apixaban (ELIQUIS) 5mg Tab Take 1 Tablet by mouth 2 times a day. 60 Tablet 5    calcium carbonate (OS-ABELARDO 500) 500 MG Tab Take 1 Tablet by mouth 2 times a day with meals.      cyclobenzaprine (FLEXERIL) 10 mg Tab Take 1 Tablet by mouth 3 times a day as needed for Moderate Pain or Muscle Spasms. 15 Tablet 1    gabapentin (NEURONTIN) 300 MG Cap Take 1 Capsule by mouth 3 times a day.      hydrOXYzine HCl (ATARAX) 25 MG Tab Take 1 Tablet by mouth 3 times a day as needed for Itching.      senna-docusate (PERICOLACE OR SENOKOT S) 8.6-50 MG Tab Take 2 Tablets by mouth every evening.      polyethylene glycol/lytes (MIRALAX) Pack Take 1 Packet by mouth 1 time a day as needed (if no bowel movement in last 2 days).      Vitamin D, Cholecalciferol, (CHOLECALCIFEROL) 25 MCG (1000 UT) Tab Take 2 Tablets by mouth every  "day. 60 Tablet 0    nicotine (NICODERM) 21 MG/24HR PATCH 24 HR Place 1 Patch on the skin every 24 hours.      ascorbic acid (VITAMIN C) 500 MG tablet Take 500 mg by mouth every day.      ferrous sulfate 325 (65 Fe) MG tablet Take 325 mg by mouth every 48 hours.      ketoconazole (NIZORAL) 2 % Cream Apply 1 Application topically 2 times a day.      nicotine (NICODERM) 7 MG/24HR PATCH 24 HR       lidocaine (LIDODERM) 5 % Patch Place 1 Patch on the skin every 12 hours.      nicotine (NICODERM) 14 MG/24HR PATCH 24 HR Place 1 Patch on the skin every 24 hours.      Home Care Oxygen Inhale 2 L/min every evening. DME = Preferred      ADVAIR DISKUS 100-50 MCG/ACT AEROSOL POWDER, BREATH ACTIVATED Inhale 1 Puff 2 times a day. 180 Each 3    montelukast (SINGULAIR) 10 MG Tab Take 1 Tablet by mouth every evening. 90 Tablet 3    fluticasone (FLONASE) 50 MCG/ACT nasal spray Administer 2 Sprays into each nostril every morning.      aripiprazole (ABILIFY) 30 MG tablet Take 1 Tablet by mouth every morning. Indications: Major Depressive Disorder 30 Tablet 0    atorvastatin (LIPITOR) 20 MG Tab Take 1 Tablet by mouth at bedtime. 30 Tablet 0     No current facility-administered medications for this visit.        ALLERGIES: Amoxicillin and Penicillin g    ROS  Constitutional: Denies fevers, Denies weight changes  Ears/Nose/Throat/Mouth: Denies nasal congestion or sore throat   Cardiovascular: Denies chest pain  Respiratory: Denies shortness of breath, Denies cough  Gastrointestinal/Hepatic: Denies nausea, vomiting  Sleep: see HPI      PHYSICAL EXAM  /84 (BP Location: Right arm, Patient Position: Sitting, BP Cuff Size: Adult)   Pulse 62   Resp 18   Ht 1.575 m (5' 2\")   Wt 118 kg (260 lb 12.8 oz)   LMP  (LMP Unknown)   SpO2 96%   BMI 47.70 kg/m²   Constitutional: Alert, no distress, well-groomed.  Skin: Warm, dry, good turgor, no rashes in visible areas.  Eye: Equal, round and reactive, conjunctiva clear, lids normal.  ENMT: " Lips without lesions, good dentition, moist mucous membranes.  Neck: Trachea midline, no masses, no thyromegaly.  Respiratory: mild respiratory effort, no cough.  MSK: shuffling gait, uses FFW  Neuro: Grossly non-focal.   Psych: impaired concentration and cognition appears impairs as well      Medical Decision Making   Assessment and Plan  The medical record was reviewed including Diagnostic and titration nocturnal polysomnogram's  and continuous nocturnal oximetry results.    1. KATHIA (obstructive sleep apnea) (Primary)  2. Obesity hypoventilation syndrome (HCC)  -Patient is currently using nocturnal oxygen but never received machine and is unsure why.  Order for IPAP machine with specifications has been ordered and will be sent to patient's DME company.  Recommended close follow-up 2 weeks after starting therapy due to patient never having been on PAP therapy in the past as well as having limited knowledge of her condition, previous studies that have been completed with recommendations, and limited ability to independently navigate complexities of the machine and troubleshoot issues that may arise.  Patient until getting machine.  Discussed that O2 will bleed into the machine as opposed to wearing both nasal cannula and PAP mask.  - DME Other  - DME Mask and Supplies    3. Lives in group home  -Patient is at risk for poor outcome due to knowledge deficits surrounding condition with ability to touch out to troubleshoot concerns on the arise.  Advise close follow-up at multiple points on the way of initiating therapy.  -Advised patient's caregiver to reach out to DME company within 1 week to ensure that they have all necessary paperwork to get patient iVAPS machine.  Advise close communication MyChart for issues and stressed importance of patient starting therapy on the machine once received.      KATHIA/PAP EDUCATION:  - Clean equipment frequently, and get new mask and supplies as allowed by insurance and DME.   - Avoid  driving a motor vehicle when drowsy  - Patients with KATHIA are at increased risk of cardiovascular disease including coronary artery disease, systemic arterial hypertension, pulmonary arterial hypertension, cardiac arrythmias, and stroke. Positive airway pressure will favorably impact many of the adverse conditions and effects provoked by KATHIA.       Return in about 6 weeks (around 4/18/2025), or if symptoms worsen or fail to improve.   - Recommend an earlier appointment, if significant treatment barriers develop.  - Patient to follow up with the appropriate healthcare practitioners for all other medical problems and issues.    Please note portions of this record was created using voice recognition software. I have made every reasonable attempt to correct obvious errors, but I expect that there are errors of grammar and possibly content I did not discover before finalizing the note.    Total time care for the patient this date was 55 minutes. Time spent includes chart review, lab review, discussion with myself. This time was spent separate from device analysis /programming on this date.

## 2025-03-12 ENCOUNTER — APPOINTMENT (OUTPATIENT)
Dept: RADIOLOGY | Facility: MEDICAL CENTER | Age: 50
End: 2025-03-12
Attending: STUDENT IN AN ORGANIZED HEALTH CARE EDUCATION/TRAINING PROGRAM
Payer: MEDICAID

## 2025-03-12 ENCOUNTER — HOSPITAL ENCOUNTER (EMERGENCY)
Facility: MEDICAL CENTER | Age: 50
End: 2025-03-13
Attending: STUDENT IN AN ORGANIZED HEALTH CARE EDUCATION/TRAINING PROGRAM
Payer: MEDICAID

## 2025-03-12 DIAGNOSIS — N12 PYELONEPHRITIS: ICD-10-CM

## 2025-03-12 DIAGNOSIS — R31.9 HEMATURIA, UNSPECIFIED TYPE: ICD-10-CM

## 2025-03-12 LAB
ABO GROUP BLD: NORMAL
ALBUMIN SERPL BCP-MCNC: 4.2 G/DL (ref 3.2–4.9)
ALBUMIN/GLOB SERPL: 1.2 G/DL
ALP SERPL-CCNC: 151 U/L (ref 30–99)
ALT SERPL-CCNC: 18 U/L (ref 2–50)
ANION GAP SERPL CALC-SCNC: 11 MMOL/L (ref 7–16)
APPEARANCE UR: CLEAR
APTT PPP: 35.6 SEC (ref 24.7–36)
AST SERPL-CCNC: 17 U/L (ref 12–45)
BACTERIA #/AREA URNS HPF: ABNORMAL /HPF
BASOPHILS # BLD AUTO: 0.2 % (ref 0–1.8)
BASOPHILS # BLD: 0.02 K/UL (ref 0–0.12)
BILIRUB SERPL-MCNC: 0.7 MG/DL (ref 0.1–1.5)
BILIRUB UR QL STRIP.AUTO: NEGATIVE
BLD GP AB SCN SERPL QL: NORMAL
BUN SERPL-MCNC: 21 MG/DL (ref 8–22)
CALCIUM ALBUM COR SERPL-MCNC: 9.6 MG/DL (ref 8.5–10.5)
CALCIUM SERPL-MCNC: 9.8 MG/DL (ref 8.5–10.5)
CASTS URNS QL MICRO: ABNORMAL /LPF (ref 0–2)
CHLORIDE SERPL-SCNC: 105 MMOL/L (ref 96–112)
CO2 SERPL-SCNC: 25 MMOL/L (ref 20–33)
COLOR UR: YELLOW
CREAT SERPL-MCNC: 1.04 MG/DL (ref 0.5–1.4)
EOSINOPHIL # BLD AUTO: 0.14 K/UL (ref 0–0.51)
EOSINOPHIL NFR BLD: 1.2 % (ref 0–6.9)
EPITHELIAL CELLS 1715: ABNORMAL /HPF (ref 0–5)
ERYTHROCYTE [DISTWIDTH] IN BLOOD BY AUTOMATED COUNT: 50.7 FL (ref 35.9–50)
GFR SERPLBLD CREATININE-BSD FMLA CKD-EPI: 65 ML/MIN/1.73 M 2
GLOBULIN SER CALC-MCNC: 3.4 G/DL (ref 1.9–3.5)
GLUCOSE SERPL-MCNC: 88 MG/DL (ref 65–99)
GLUCOSE UR STRIP.AUTO-MCNC: NEGATIVE MG/DL
HCT VFR BLD AUTO: 40.3 % (ref 37–47)
HGB BLD-MCNC: 12.2 G/DL (ref 12–16)
IMM GRANULOCYTES # BLD AUTO: 0.04 K/UL (ref 0–0.11)
IMM GRANULOCYTES NFR BLD AUTO: 0.3 % (ref 0–0.9)
INR PPP: 1.26 (ref 0.87–1.13)
KETONES UR STRIP.AUTO-MCNC: NEGATIVE MG/DL
LEUKOCYTE ESTERASE UR QL STRIP.AUTO: ABNORMAL
LIPASE SERPL-CCNC: 25 U/L (ref 11–82)
LYMPHOCYTES # BLD AUTO: 2.2 K/UL (ref 1–4.8)
LYMPHOCYTES NFR BLD: 18.1 % (ref 22–41)
MCH RBC QN AUTO: 26 PG (ref 27–33)
MCHC RBC AUTO-ENTMCNC: 30.3 G/DL (ref 32.2–35.5)
MCV RBC AUTO: 85.7 FL (ref 81.4–97.8)
MICRO URNS: ABNORMAL
MONOCYTES # BLD AUTO: 0.79 K/UL (ref 0–0.85)
MONOCYTES NFR BLD AUTO: 6.5 % (ref 0–13.4)
NEUTROPHILS # BLD AUTO: 8.95 K/UL (ref 1.82–7.42)
NEUTROPHILS NFR BLD: 73.7 % (ref 44–72)
NITRITE UR QL STRIP.AUTO: NEGATIVE
NRBC # BLD AUTO: 0 K/UL
NRBC BLD-RTO: 0 /100 WBC (ref 0–0.2)
PH UR STRIP.AUTO: 5.5 [PH] (ref 5–8)
PLATELET # BLD AUTO: 263 K/UL (ref 164–446)
PMV BLD AUTO: 11.4 FL (ref 9–12.9)
POTASSIUM SERPL-SCNC: 4.1 MMOL/L (ref 3.6–5.5)
PROT SERPL-MCNC: 7.6 G/DL (ref 6–8.2)
PROT UR QL STRIP: NEGATIVE MG/DL
PROTHROMBIN TIME: 15.8 SEC (ref 12–14.6)
RBC # BLD AUTO: 4.7 M/UL (ref 4.2–5.4)
RBC # URNS HPF: ABNORMAL /HPF (ref 0–2)
RBC UR QL AUTO: ABNORMAL
RH BLD: NORMAL
SODIUM SERPL-SCNC: 141 MMOL/L (ref 135–145)
SP GR UR STRIP.AUTO: 1.01
UROBILINOGEN UR STRIP.AUTO-MCNC: 1 EU/DL
WBC # BLD AUTO: 12.1 K/UL (ref 4.8–10.8)
WBC #/AREA URNS HPF: ABNORMAL /HPF

## 2025-03-12 PROCEDURE — 85730 THROMBOPLASTIN TIME PARTIAL: CPT

## 2025-03-12 PROCEDURE — 85610 PROTHROMBIN TIME: CPT

## 2025-03-12 PROCEDURE — 86901 BLOOD TYPING SEROLOGIC RH(D): CPT

## 2025-03-12 PROCEDURE — 87086 URINE CULTURE/COLONY COUNT: CPT

## 2025-03-12 PROCEDURE — 81001 URINALYSIS AUTO W/SCOPE: CPT

## 2025-03-12 PROCEDURE — 700117 HCHG RX CONTRAST REV CODE 255: Mod: UD | Performed by: STUDENT IN AN ORGANIZED HEALTH CARE EDUCATION/TRAINING PROGRAM

## 2025-03-12 PROCEDURE — 36415 COLL VENOUS BLD VENIPUNCTURE: CPT

## 2025-03-12 PROCEDURE — 80053 COMPREHEN METABOLIC PANEL: CPT

## 2025-03-12 PROCEDURE — 85025 COMPLETE CBC W/AUTO DIFF WBC: CPT

## 2025-03-12 PROCEDURE — 700111 HCHG RX REV CODE 636 W/ 250 OVERRIDE (IP): Mod: JZ,UD | Performed by: STUDENT IN AN ORGANIZED HEALTH CARE EDUCATION/TRAINING PROGRAM

## 2025-03-12 PROCEDURE — 86850 RBC ANTIBODY SCREEN: CPT

## 2025-03-12 PROCEDURE — 74174 CTA ABD&PLVS W/CONTRAST: CPT

## 2025-03-12 PROCEDURE — 96374 THER/PROPH/DIAG INJ IV PUSH: CPT

## 2025-03-12 PROCEDURE — 83690 ASSAY OF LIPASE: CPT

## 2025-03-12 PROCEDURE — 99284 EMERGENCY DEPT VISIT MOD MDM: CPT

## 2025-03-12 PROCEDURE — 86900 BLOOD TYPING SEROLOGIC ABO: CPT

## 2025-03-12 RX ORDER — CEFTRIAXONE 2 G/1
2000 INJECTION, POWDER, FOR SOLUTION INTRAMUSCULAR; INTRAVENOUS ONCE
Status: COMPLETED | OUTPATIENT
Start: 2025-03-12 | End: 2025-03-12

## 2025-03-12 RX ADMIN — IOHEXOL 100 ML: 350 INJECTION, SOLUTION INTRAVENOUS at 22:58

## 2025-03-12 RX ADMIN — CEFTRIAXONE SODIUM 2000 MG: 2 INJECTION, POWDER, FOR SOLUTION INTRAMUSCULAR; INTRAVENOUS at 23:14

## 2025-03-12 ASSESSMENT — FIBROSIS 4 INDEX: FIB4 SCORE: 0.92

## 2025-03-12 ASSESSMENT — PAIN DESCRIPTION - PAIN TYPE: TYPE: ACUTE PAIN

## 2025-03-13 ENCOUNTER — PHARMACY VISIT (OUTPATIENT)
Dept: PHARMACY | Facility: MEDICAL CENTER | Age: 50
End: 2025-03-13
Payer: COMMERCIAL

## 2025-03-13 VITALS
HEIGHT: 61 IN | OXYGEN SATURATION: 92 % | TEMPERATURE: 97.7 F | WEIGHT: 260 LBS | SYSTOLIC BLOOD PRESSURE: 110 MMHG | HEART RATE: 76 BPM | BODY MASS INDEX: 49.09 KG/M2 | RESPIRATION RATE: 18 BRPM | DIASTOLIC BLOOD PRESSURE: 57 MMHG

## 2025-03-13 PROCEDURE — RXMED WILLOW AMBULATORY MEDICATION CHARGE: Performed by: STUDENT IN AN ORGANIZED HEALTH CARE EDUCATION/TRAINING PROGRAM

## 2025-03-13 RX ORDER — SULFAMETHOXAZOLE AND TRIMETHOPRIM 800; 160 MG/1; MG/1
1 TABLET ORAL 2 TIMES DAILY
Qty: 14 TABLET | Refills: 0 | Status: ACTIVE | OUTPATIENT
Start: 2025-03-13 | End: 2025-03-20

## 2025-03-13 NOTE — ED NOTES
Progress Notes by Darshana Cee APN at 05/23/18 08:11 AM     Author:  Darshana Cee APN Service:  (none) Author Type:  Nurse Practitioner     Filed:  05/23/18 10:18 AM Encounter Date:  5/23/2018 Status:  Signed     :  Darshana Cee APN (Nurse Practitioner)            CC/HPI:[MH1.1T]  P[MB1.1M]atient here today for follow up appointment status post[MH1.1T] excision of fracture fragment 5th toe right foot.[MH1.1M]  P[MB1.2M]atient states[MH1.2M] she is[MB1.2M] doing well, just has some concerns as to what is \"normal\" versus not normal at this point.[MH1.2M]  She[MB1.2M] states she does have some pain, especially when the foot is in the dependent position[MH1.2M],[MB1.2M] but is relieved via elevating.[MH1.2M]  P[MB1.2M]atient states has not been using any pain medication for the last 2[MH1.2M] or[MB1.2M] 3 days.   Natur[MH1.2M]e:[MH1.1T] throbbing, swollen feeling[MH1.1M]    Pain scale:[MH1.1T]  0/10 right now; 4/10 when dependent[MB1.2M]  Duration/Onset[MH1.1T] 11/4/17[MB1.2M]  LOV:[MH1.1T]  5-2-2018[MH1.1M]   DOS:[MH1.1T]  5-[MH1.1M]  Location:[MH1.1T]  right foot[MH1.1M]   Course:[MH1.1T] getting better[MH1.1M]   Aggravating factors:[MH1.1T]  when foot is in dependent position[MH1.1M]   Treatments:[MH1.1T] elevating, ACE wrap, icing[MH1.1M], post-op shoe, Norco[MB1.2M]  Other: work: none , PCP: Afshin Wakefield MD, Last exam by PCP:[MH1.1C] 5-8-2018[MH1.1M]    Any new medical problems since last visit?[MH1.1T] none[MH1.1M]     Denies F/V, N/V, decreased appetite, insomnia.  Pain medication:[MB1.2T] Norco as needed for pain[MB1.2M].    PMH[MB1.2T]  Patient Active Problem List    Diagnosis    • Lump or mass in breast   • Chronic rhinitis   • Abnormal mammogram, unspecified   • Equinus deformity of foot, acquired   • Lesion of plantar nerve   • Enthesopathy of ankle and tarsus, unspecified   • Right foot pain   • Raynaud's disease   • Osteopenia   • Calculus of kidney   • ANTHONY (generalized anxiety  Pt ambulated to and from bathroom with steady gait using personal walker, urine collected and sent.    disorder)   • gluten sensitivity   • Screen for colon cancer   • Closed nondisplaced fracture of proximal phalanx of lesser toe of right foot[MB1.3T]       MEDICATIONS[MB1.2T]  Current Outpatient Prescriptions     Medication  Sig   • Calcium Citrate-Vitamin D (CALCIUM CITRATE + D3) 200-250 MG-UNIT TABS Take  by mouth.   • Omega-3 Fatty Acids (OMEGA 3 OR) Take  by mouth.   • Probiotic Product (PROBIOTIC DAILY) CAPS Take  by mouth.   • L-Theanine 100 MG CAPS Take  by mouth.   • estradiol (ESTRACE) 0.1 MG/GM vaginal cream Insert 0.5g vaginally at bedtime x 2 wks followed by 2x/wk for maintenance[MB1.3T]        PODIATRIC SPECIFIC RISK FACTORS  Cardiovascular:  - claudication, - temperature changes feet (cold), - lower extremity edema;  Neurologic:  - paresthesia, - burning;  Musculoskeletal: - past ulcer, - amputation    ALLERGIES[MB1.2T]  Allergies      Allergen   Reactions   • Neosporin [Triple Antibiotic]  Rash, Itching and Swelling   • Polysporin [Bacitracin-Polymyxin B]  Rash, Itching and Swelling   • Adhesive Tape  Other - See Comments     Irritation to skin[MB1.3T]        FAMILY/SOCIAL HX[MB1.2T]  History    Smoking Status    • Never Smoker   Smokeless Tobacco    • Never Used[MB1.3T]          OBJECTIVE    Constitutional:  Patient alert and oriented x 3   Good body habitus and hygiene[MB1.2T]     RIGHT[MB1.2M] foot:  Bandage:[MB1.2T] small amount dried blood on inner bandage,[MB1.2M] intact.  Vascular status to the foot intact with capillary refill to the toes of[MB1.2T] 3[MB1.2M] seconds.  Neuro: Intact to light touch with numbness noted jose d-incisionally  There is no warmth or drainage.   Erythema:[MB1.2T]  Mild[MB1.2M] consistent with surgery[MB1.2T].[MB1.2M]  Edema:[MB1.2T]  Mild[MB1.2M] consistent with surgery.   Ecchymosis:[MB1.2T] Mild to moderate[MB1.2M] consistent with surgery[MB1.2T].[MB1.2M]  Suture[MB1.2T]s[MB1.2M] & Incision intact.  No dehiscence or drainage.  No bleeding.  No SOI of operative  site.[MB1.2T]  No right calf pain with palpation.[MB1.2M]  MSK:  patient able to wiggle toes and actively and passively plantarflex and dorsiflex ankle[MB1.2T] with slight grimacing.[MB1.2M]    ASSESSMENT   (global[MB1.2T] 5/23/18 to 8/23/18[MB1.2M])  Stable post-op, no infection[MB1.2T]  E[MB1.2M]xcision of non-union fracture fragment,[MB1.2C] RIGHT[MB1.2M] 5th digit proximal phalanx base[MB1.2C]      PLAN[MB1.2T]  Shalini was clinically examined.  She states she has no pain right now.  She only has pain when the foot is in a dependent position.  She has been taking Norco as needed for pain, but she states she has not needed to take much.  She has no complaints of right calf pain.  The incision and sutures are intact.  There are good signs of healing.  There are no signs of infection.  New steri-strips applied and new bandages applied.  Patient will continue to be non-weightbearing on the left and use crutches.  She will continue to keep the dressings clean and dry[MB1.2M].[MB1.2T]  She will continue using ice and elevating right foot.[MB1.2M]  Call if any questions or problems.    Procedures:[MB1.2T]  New steri-strips applied and new bandages applied.[MB1.2M]    RX:[MB1.2T]  Continue Norco as needed for pain.[MB1.2M]    RTC: 1[MB1.2T] week[MB1.2M]    Electronically Signed by:    RELL Tilley, 5/23/2018     Supervising Physician:   Yonis Barreto DPM, 5/23/2018[MB1.2T]                  Revision History        User Key Date/Time User Provider Type Action    > MB1.3 05/23/18 10:18 AM Darshana Cee APN Nurse Practitioner Sign     MB1.2 05/23/18 10:03 AM Darshana Cee APN Nurse Practitioner      MB1.1 05/23/18 09:20 AM Darshana Cee APN Nurse Practitioner      MH1.2 05/23/18 08:26 AM Paulino Flores CMA Certified Medical Assistant Sign at close encounter     MH1.1 05/23/18 08:11 AM Paulino Flores CMA Certified Medical Assistant     C - Copied, M - Manual, T - Template

## 2025-03-13 NOTE — ED TRIAGE NOTES
"Chief Complaint   Patient presents with    Rectal Bleeding     Pt reports bright red blood in stool around 1400. Right sided flank pain, +n/v.      Pt +thinners.  Abd pain protocol ordered.     Pt is alert and oriented, speaking in full sentences, follows commands and responds appropriately to questions. Resperations are even and unlabored.      Pt placed in lobby. Pt educated on triage process. Pt encouraged to alert staff for any changes.     Patient and staff wearing appropriate PPE.    BP (!) 146/73   Pulse 68   Temp 36.4 °C (97.6 °F) (Temporal)   Resp 16   Ht 1.549 m (5' 1\")   Wt 118 kg (260 lb)   SpO2 94%      "

## 2025-03-13 NOTE — ED NOTES
"Pt discharged home, pt A&Ox4, on room air, steady gait w/ personal walker. Meds tubed from pharmacy and given to pt. Given MTM resources. IV discontinued and gauze placed, pt in possession of belongings. Pt provided discharge education and information pertaining to medications and follow up appointments. Pt received copy of discharge instructions and verbalized understanding. /57   Pulse 76   Temp 36.5 °C (97.7 °F) (Temporal)   Resp 18   Ht 1.549 m (5' 1\")   Wt 118 kg (260 lb)   LMP  (LMP Unknown)   SpO2 92%   BMI 49.13 kg/m²     "

## 2025-03-13 NOTE — DISCHARGE INSTRUCTIONS
Please take your antibiotics as prescribed twice daily for the next 7 days for treatment of your infection.  Follow-up with your primary care doctor in 1 to 2 weeks for recheck.  Return to the emergency department if you develop any worsening pain radiating to your back, recurrent vomiting, heavier bleeding or high fever.

## 2025-03-13 NOTE — ED PROVIDER NOTES
ED Provider Note    CHIEF COMPLAINT  Chief Complaint   Patient presents with    Rectal Bleeding     Pt reports bright red blood in stool around 1400. Right sided flank pain, +n/v.        EXTERNAL RECORDS REVIEWED  Outpatient Notes patient was followed by pulmonary sleep center 3/7/2025 noting a history of KATHIA/OHS, asthma/COPD overlap.    HPI/ROS  LIMITATION TO HISTORY   Select: : None      Lorene Haro is a 49 y.o. female who presents to the emergency department for evaluation of rectal bleeding.  The patient notes this is now her third episode in the last 2 months of bright red bleeding occurring during bowel movements.  She states that she noted at this morning with about a cup of bright red blood associated with otherwise normal-appearing stool and then it occurred again around 2:00 this afternoon.  She also reports some bilateral lower abdominal pain and bilateral flank pain that she states has been going on for the last few days.  She denies dizziness or lightheadedness, dark black or thick stool, nausea vomiting or hematemesis.  She notes that she takes blood thinners which she is adherent to.    PAST MEDICAL HISTORY   has a past medical history of A-fib ([facility]), MONI (acute kidney injury) ([facility]) (08/09/2023), Asthma, Bipolar 2 disorder ([facility]), Chickenpox, Chronic obstructive pulmonary disease (HCC), Congestive heart failure (HCC), Hypertension, On home oxygen therapy, Other psychological stress, Schizophrenic disorder (HCC), and Yeast infection of the skin (04/01/2021).    SURGICAL HISTORY   has a past surgical history that includes hysterectomy laparoscopy; colectomy; cholecystectomy; knee arthroscopy (Left); and orif, ankle (Left, 11/2/2024).    FAMILY HISTORY  Family History   Problem Relation Age of Onset    No Known Problems Mother     Cancer Sister        SOCIAL HISTORY  Social History     Tobacco Use    Smoking status: Some Days     Current packs/day: 0.00     Types: Cigarettes     Last attempt to  quit: 10/12/2022     Years since quittin.4    Smokeless tobacco: Never   Vaping Use    Vaping status: Never Used   Substance and Sexual Activity    Alcohol use: Not Currently    Drug use: Never    Sexual activity: Not Currently       CURRENT MEDICATIONS  Home Medications       Reviewed by Elif Ellsworth R.N. (Registered Nurse) on 25 at 1915  Med List Status: Not Addressed     Medication Last Dose Status   ADVAIR DISKUS 100-50 MCG/ACT AEROSOL POWDER, BREATH ACTIVATED  Active   apixaban (ELIQUIS) 5mg Tab  Active   aripiprazole (ABILIFY) 30 MG tablet  Active   ascorbic acid (VITAMIN C) 500 MG tablet  Active   atorvastatin (LIPITOR) 20 MG Tab  Active   benzonatate (TESSALON) 100 MG Cap  Active   calcium carbonate (OS-ABELARDO 500) 500 MG Tab  Active   cyclobenzaprine (FLEXERIL) 10 mg Tab  Active   cyclobenzaprine (FLEXERIL) 10 mg Tab  Active   ferrous sulfate 325 (65 Fe) MG tablet  Active   fluticasone (FLONASE) 50 MCG/ACT nasal spray  Active   gabapentin (NEURONTIN) 300 MG Cap  Active   Home Care Oxygen  Active   hydrOXYzine HCl (ATARAX) 25 MG Tab  Active   ketoconazole (NIZORAL) 2 % Cream  Active   lidocaine (ASPERFLEX) 4 % Patch  Active   lidocaine (LIDODERM) 5 % Patch  Active   metoprolol SR (TOPROL XL) 50 MG TABLET SR 24 HR  Active   montelukast (SINGULAIR) 10 MG Tab  Active   nicotine (NICODERM) 14 MG/24HR PATCH 24 HR  Active   nicotine (NICODERM) 21 MG/24HR PATCH 24 HR  Active   nicotine (NICODERM) 7 MG/24HR PATCH 24 HR  Active   polyethylene glycol/lytes (MIRALAX) Pack  Active   senna-docusate (PERICOLACE OR SENOKOT S) 8.6-50 MG Tab  Active   Vitamin D, Cholecalciferol, (CHOLECALCIFEROL) 25 MCG (1000 UT) Tab  Active                    ALLERGIES  Allergies   Allergen Reactions    Amoxicillin Rash and Itching     Tolerates cephalosporins, pt reports that she gets a rash all over her body and gets itchy    Penicillin G Rash and Itching     pt reports that she gets a rash all over her body and gets itchy  "      PHYSICAL EXAM  VITAL SIGNS: /57   Pulse 76   Temp 36.5 °C (97.7 °F) (Temporal)   Resp 18   Ht 1.549 m (5' 1\")   Wt 118 kg (260 lb)   LMP  (LMP Unknown)   SpO2 92%   BMI 49.13 kg/m²    Constitutional: No acute distress, resting comfortably, chronically ill-appearing  HEENT: Atraumatic, normocephalic, pupils are equal round reactive to light, nose normal, mouth shows dry mucous membranes  Cardiovascular: Regular rate and rhythm, no murmur, rub or gallop, 2+ pulses peripherally x4  Thorax & Lungs: No respiratory distress, no wheezes, rales or rhonchi, no chest wall tenderness.  GI: Soft, non-distended, diffuse lower abdominal tenderness, no guarding, no rebound  Rectal: Performed with a nurse chaperone present.  Small nonthrombosed hemorrhoid present externally.  No melena or hematochezia.  No internal hemorrhoids appreciated.  Skin: Warm, dry, no acute rash or lesion  Musculoskeletal: Moving all extremities, no acute deformity, symmetric lower extremity edema, no tenderness  Neurologic: A&Ox3, at baseline mentation  Psychiatric: Appropriate affect for situation at this time          EKG/LABS  Labs Reviewed   CBC WITH DIFFERENTIAL - Abnormal; Notable for the following components:       Result Value    WBC 12.1 (*)     MCH 26.0 (*)     MCHC 30.3 (*)     RDW 50.7 (*)     Neutrophils-Polys 73.70 (*)     Lymphocytes 18.10 (*)     Neutrophils (Absolute) 8.95 (*)     All other components within normal limits   COMP METABOLIC PANEL - Abnormal; Notable for the following components:    Alkaline Phosphatase 151 (*)     All other components within normal limits   URINALYSIS - Abnormal; Notable for the following components:    Leukocyte Esterase Large (*)     Occult Blood Large (*)     All other components within normal limits   PROTHROMBIN TIME - Abnormal; Notable for the following components:    PT 15.8 (*)     INR 1.26 (*)     All other components within normal limits   URINE MICROSCOPIC (W/UA) - Abnormal; " Notable for the following components:    WBC 21-50 (*)     RBC 21-50 (*)     Bacteria Rare (*)     All other components within normal limits   LIPASE   ESTIMATED GFR   APTT   COD (ADULT)   URINE CULTURE-EXISTING-LESS THAN 48 HOURS         RADIOLOGY/PROCEDURES   I have independently interpreted the diagnostic imaging associated with this visit and am waiting the final reading from the radiologist.   My preliminary interpretation is as follows: CT scan with no active extravasation into bowel    Radiologist interpretation:  CTA ABDOMEN PELVIS W & W/O POST PROCESS   Final Result         1.  Again visualized aneurysm or dissection   2.  No intraluminal contrast identified to indicate site of GI bleed   3.  Hepatomegaly   4.  Irregular hepatic contour favoring changes of cirrhosis   5.  Trace pericardial effusion          COURSE & MEDICAL DECISION MAKING    ASSESSMENT, COURSE AND PLAN  Care Narrative:     Patient presents to the emergency department for what she describes as bright red blood in her stool noted today and also previously over the last few days associated with some flank pain.  Lower abdomen minimally tender on examination.  No CVA tenderness.  No blood appreciated on rectal examination currently and patient's vital signs are stable.  Broad workup undertaken to screen for acute blood loss anemia, significant coagulopathy and to assess for acutely bleeding intraintestinal lesion.  Also considered that the blood is actually coming from her urine which patient is unclear about therefore urinalysis obtained.  Workup overall quite reassuring with very minimally elevated leukocytosis, no acute blood loss anemia, MONI, electrolyte abnormality.  Mild coagulopathy expected in the setting of anticoagulated status.  CT angiography ultimately with no active GI bleed visualized.  Urinalysis was obtained which was remarkable for bacteriuria, pyuria and occult blood and patient notes that her urine was bloody during this  episode of urination.  I suspect that bleeding is actually cystic in origin and not truly rectal after all.  She has no kidney stone or obstructing ureteral stone on CT scan.  Suspect pyelonephritis and patient was provided with a dose of IV ceftriaxone for such.  She overall however appears quite well, is not septic, not vomiting and a good candidate for oral antibiotic therapy with no MONI or other complicating factor.  She is comfortable with trial of oral antibiotic therapy at home.  Recommended that she follow-up with her primary care doctor in 1 week for recheck and follow-up of urine culture results.  Patient discharged on Bactrim.  Return precautions discussed and all questions answered and she was discharged in stable condition.    ADDITIONAL PROBLEMS MANAGED  Chronically anticoagulated  Urinary tract infection    DISPOSITION AND DISCUSSIONS  I have discussed management of the patient with the following physicians and JAY's: None    Discussion of management with other QHP or appropriate source(s): None     Escalation of care considered, and ultimately not performed:acute inpatient care management, however at this time, the patient is most appropriate for outpatient management    Decision tools and prescription drugs considered including, but not limited to: Antibiotics IV ceftriaxone followed by Bactrim orally .    FINAL DIAGNOSIS  1. Hematuria, unspecified type    2. Pyelonephritis         Electronically signed by: Hiram Garcia M.D., 3/12/2025 9:16 PM

## 2025-03-14 ENCOUNTER — HOSPITAL ENCOUNTER (EMERGENCY)
Facility: MEDICAL CENTER | Age: 50
End: 2025-03-15
Attending: EMERGENCY MEDICINE
Payer: MEDICAID

## 2025-03-14 DIAGNOSIS — M54.9 MUSCULOSKELETAL BACK PAIN: ICD-10-CM

## 2025-03-14 LAB — EKG IMPRESSION: NORMAL

## 2025-03-14 PROCEDURE — 93005 ELECTROCARDIOGRAM TRACING: CPT | Mod: TC

## 2025-03-14 PROCEDURE — 93005 ELECTROCARDIOGRAM TRACING: CPT | Mod: TC | Performed by: EMERGENCY MEDICINE

## 2025-03-14 ASSESSMENT — FIBROSIS 4 INDEX: FIB4 SCORE: 0.78

## 2025-03-15 VITALS
HEIGHT: 62 IN | DIASTOLIC BLOOD PRESSURE: 56 MMHG | RESPIRATION RATE: 16 BRPM | OXYGEN SATURATION: 92 % | SYSTOLIC BLOOD PRESSURE: 118 MMHG | WEIGHT: 264 LBS | BODY MASS INDEX: 48.58 KG/M2 | HEART RATE: 69 BPM | TEMPERATURE: 98 F

## 2025-03-15 LAB
BACTERIA UR CULT: NORMAL
SIGNIFICANT IND 70042: NORMAL
SITE SITE: NORMAL
SOURCE SOURCE: NORMAL

## 2025-03-15 PROCEDURE — A9270 NON-COVERED ITEM OR SERVICE: HCPCS | Mod: UD | Performed by: EMERGENCY MEDICINE

## 2025-03-15 PROCEDURE — 99284 EMERGENCY DEPT VISIT MOD MDM: CPT

## 2025-03-15 PROCEDURE — 700102 HCHG RX REV CODE 250 W/ 637 OVERRIDE(OP): Mod: UD | Performed by: EMERGENCY MEDICINE

## 2025-03-15 PROCEDURE — 700101 HCHG RX REV CODE 250: Mod: UD | Performed by: EMERGENCY MEDICINE

## 2025-03-15 RX ORDER — ACETAMINOPHEN 500 MG
1000 TABLET ORAL ONCE
Status: COMPLETED | OUTPATIENT
Start: 2025-03-15 | End: 2025-03-15

## 2025-03-15 RX ORDER — LIDOCAINE 4 G/G
1 PATCH TOPICAL ONCE
Status: DISCONTINUED | OUTPATIENT
Start: 2025-03-15 | End: 2025-03-15 | Stop reason: HOSPADM

## 2025-03-15 RX ORDER — CYCLOBENZAPRINE HCL 10 MG
10 TABLET ORAL ONCE
Status: COMPLETED | OUTPATIENT
Start: 2025-03-15 | End: 2025-03-15

## 2025-03-15 RX ADMIN — LIDOCAINE 1 PATCH: 4 PATCH TOPICAL at 00:59

## 2025-03-15 RX ADMIN — ACETAMINOPHEN 1000 MG: 500 TABLET ORAL at 00:57

## 2025-03-15 RX ADMIN — CYCLOBENZAPRINE 10 MG: 10 TABLET, FILM COATED ORAL at 00:59

## 2025-03-15 NOTE — ED PROVIDER NOTES
ED Provider Note    CHIEF COMPLAINT  Chief Complaint   Patient presents with    Back Pain     Pt TESS krishnamurthy from a group home for neck pain/low back pain and left arm pain. Pt denies injury but reports she woke up with pain. Pt reports increased back pain when laying down.        EXTERNAL RECORDS REVIEWED  Last seen in the ED 3/12/2025 for rectal bleeding and right flank pain. Diagnosed with pyelonephritis given IV ceftriaxone and a prescription for Bactrim.    HPI/ROS  LIMITATION TO HISTORY   Select: : None  OUTSIDE HISTORIAN(S):  None    Lorene Haro is a 49 y.o. female who presents to the ER for neck pain that radiates down her left arm and down her back that started tonight.  Has had pain similar like this in the past.  Has known cervical and lumbar disc issues.  Denies any weakness in the legs no numbness in the legs, occasional tingling in the left hand.  No weakness in the left hand.  No headaches.  No recent trauma.  Denies fevers, no IVDU.  Does have urinary incontinence but this has been present and stable for the past 3 years.  Denies any bowel incontinence no saddle anesthesia.  She has not taken anything for pain yet        PAST MEDICAL HISTORY   has a past medical history of A-fib ([facility]), MONI (acute kidney injury) ([facility]) (08/09/2023), Asthma, Bipolar 2 disorder ([facility]), Chickenpox, Chronic obstructive pulmonary disease ([facility]), Congestive heart failure ([facility]), Hypertension, On home oxygen therapy, Other psychological stress, Schizophrenic disorder ([facility]), and Yeast infection of the skin (04/01/2021).    SURGICAL HISTORY   has a past surgical history that includes hysterectomy laparoscopy; colectomy; cholecystectomy; knee arthroscopy (Left); and orif, ankle (Left, 11/2/2024).    FAMILY HISTORY  Family History   Problem Relation Age of Onset    No Known Problems Mother     Cancer Sister        SOCIAL HISTORY  Social History     Tobacco Use    Smoking status: Some Days     Current packs/day: 0.00     Types:  Cigarettes     Last attempt to quit: 10/12/2022     Years since quittin.4    Smokeless tobacco: Never   Vaping Use    Vaping status: Never Used   Substance and Sexual Activity    Alcohol use: Not Currently    Drug use: Never    Sexual activity: Not Currently       CURRENT MEDICATIONS  Home Medications       Reviewed by Lisset Ronquillo R.N. (Registered Nurse) on 25 at 2214  Med List Status: Not Addressed     Medication Last Dose Status   ADVAIR DISKUS 100-50 MCG/ACT AEROSOL POWDER, BREATH ACTIVATED  Active   apixaban (ELIQUIS) 5mg Tab  Active   aripiprazole (ABILIFY) 30 MG tablet  Active   ascorbic acid (VITAMIN C) 500 MG tablet  Active   atorvastatin (LIPITOR) 20 MG Tab  Active   benzonatate (TESSALON) 100 MG Cap  Active   calcium carbonate (OS-ABELARDO 500) 500 MG Tab  Active   cyclobenzaprine (FLEXERIL) 10 mg Tab  Active   cyclobenzaprine (FLEXERIL) 10 mg Tab  Active   ferrous sulfate 325 (65 Fe) MG tablet  Active   fluticasone (FLONASE) 50 MCG/ACT nasal spray  Active   gabapentin (NEURONTIN) 300 MG Cap  Active   Home Care Oxygen  Active   hydrOXYzine HCl (ATARAX) 25 MG Tab  Active   ketoconazole (NIZORAL) 2 % Cream  Active   lidocaine (ASPERFLEX) 4 % Patch  Active   lidocaine (LIDODERM) 5 % Patch  Active   metoprolol SR (TOPROL XL) 50 MG TABLET SR 24 HR  Active   montelukast (SINGULAIR) 10 MG Tab  Active   nicotine (NICODERM) 14 MG/24HR PATCH 24 HR  Active   nicotine (NICODERM) 21 MG/24HR PATCH 24 HR  Active   nicotine (NICODERM) 7 MG/24HR PATCH 24 HR  Active   polyethylene glycol/lytes (MIRALAX) Pack  Active   senna-docusate (PERICOLACE OR SENOKOT S) 8.6-50 MG Tab  Active   sulfamethoxazole-trimethoprim (BACTRIM DS) 800-160 MG tablet  Active   Vitamin D, Cholecalciferol, (CHOLECALCIFEROL) 25 MCG (1000 UT) Tab  Active                    ALLERGIES  Allergies   Allergen Reactions    Amoxicillin Rash and Itching     Tolerates cephalosporins, pt reports that she gets a rash all over her body and gets itchy     "Penicillin G Rash and Itching     pt reports that she gets a rash all over her body and gets itchy       PHYSICAL EXAM  VITAL SIGNS: /56   Pulse 69   Temp 36.7 °C (98 °F) (Temporal)   Resp 16   Ht 1.575 m (5' 2\")   Wt 120 kg (264 lb)   LMP  (LMP Unknown)   SpO2 92%   BMI 48.29 kg/m²    General: Laying calmly in stretcher, no distress  Head: NCAT  Eyes: Pupils equal and reactive  CV: Regular rate rhythm no murmurs  Pulmonary: CTAB normal work of breathing  Neck: Mild tenderness throughout the midline and paraspinal cervical spine  Back: + T and L-spine midline and paraspinal tenderness palpation  Neuro: 5/5 strength in bilateral hip flexion, knee flexion/extension, ankle dorsiflexion and plantarflexion.  Sensation to light touch intact in lower extremities.  1+ patellar reflexes.  Median/ulnar/radial motor and sensory nerve distribution intact in bilateral hands.  2+ radial pulses.    EKG/LABS    EKG 2220 sinus rhythm rate 64 normal axis normal intervals QTc 388 milliseconds, incomplete right bundle branch pattern, T wave inversion in lead III similar to previous EKGs no STEMI  I have independently interpreted this EKG        COURSE & MEDICAL DECISION MAKING    ASSESSMENT, COURSE AND PLAN  Care Narrative: Differential includes degenerative disc disease, herniated disc, radiculopathy, cauda equina, muscle strain, muscle spasm    On arrival patient is afebrile and overall well-appearing.  Has tenderness throughout the entire midline and paraspinal neck and back.  No focal neurologic deficits.  No inciting traumatic injuries to make me suspicious for fractures or dislocations.  No fever or infectious signs or symptoms to suggest infectious process, no indication for labs.  I do not feel any imaging is emergent at this time thus we will start with symptomatic treatment with Tylenol, lidocaine patch and Flexeril.  Patient feeling better after this and able to ambulate with her walker.  She feels comfortable " following up with her spinal surgeon and will use her meds that she has at home.  Discharged home in stable condition    DISPOSITION AND DISCUSSIONS    Escalation of care considered, and ultimately not performed:diagnostic imaging    Barriers to care at this time, including but not limited to: None.     Decision tools and prescription drugs considered including, but not limited to: Patient already has Flexeril at home.    FINAL DIAGNOSIS  1. Musculoskeletal back pain         Electronically signed by: Juan Blank M.D., 3/15/2025 12:32 AM

## 2025-03-15 NOTE — ED TRIAGE NOTES
Chief Complaint   Patient presents with    Back Pain     Pt TESS raghu from a group home for neck pain/low back pain and left arm pain. Pt denies injury but reports she woke up with pain. Pt reports increased back pain when laying down.        Pt is alert and oriented, speaking in full sentences, follows commands and responds appropriately to questions. Resperations are even and unlabored.      Pt placed in lobby. Pt educated on triage process. Pt encouraged to alert staff for any changes.

## 2025-03-15 NOTE — ED NOTES
Patient discharged home per ERP. Discharge teaching, education, and POC discussed with patient who verbalizes understanding. Patient instructed to return to the ER if symptoms worsen. No other questions at this time.    Vitals are stable. Patient alert and oriented.  Pt ambulated with walker off unit safely.

## 2025-03-15 NOTE — DISCHARGE INSTRUCTIONS
Take the Tylenol and the Flexeril as needed for your back pain as well as lidocaine patches.  Follow-up with one of your spine doctors as soon as possible.  If you feel like the pain is getting worse or you are having worsening numbness or weakness in arms or legs come back to the ER

## 2025-03-17 DIAGNOSIS — J44.89 ASTHMA-COPD OVERLAP SYNDROME (HCC): ICD-10-CM

## 2025-03-18 NOTE — TELEPHONE ENCOUNTER
Have we ever prescribed this med? Yes.  If yes, what date? 05/30/24    Last OV: 12/06/24 with Maureen SHRESTHA     Next OV: No Pending appt.     DX: asthma-COPD overlap syndrome    Medications:   Requested Prescriptions     Pending Prescriptions Disp Refills    ADVAIR DISKUS 100-50 MCG/ACT AEROSOL POWDER, BREATH ACTIVATED [Pharmacy Med Name: ADVAIR 100-50 DISKUS]  0     Sig: INHALE 1 PUFF BY MOUTH EVERY 12 HOURS. RINSE MOUTH AFTER EACH USE.

## 2025-03-19 ENCOUNTER — ANTICOAGULATION VISIT (OUTPATIENT)
Dept: VASCULAR LAB | Facility: MEDICAL CENTER | Age: 50
End: 2025-03-19
Attending: INTERNAL MEDICINE
Payer: MEDICAID

## 2025-03-19 ENCOUNTER — NON-PROVIDER VISIT (OUTPATIENT)
Dept: CARDIOLOGY | Facility: MEDICAL CENTER | Age: 50
End: 2025-03-19

## 2025-03-19 DIAGNOSIS — I48.0 PAROXYSMAL ATRIAL FIBRILLATION (HCC): ICD-10-CM

## 2025-03-19 PROCEDURE — 93298 REM INTERROG DEV EVAL SCRMS: CPT | Mod: 26 | Performed by: INTERNAL MEDICINE

## 2025-03-19 PROCEDURE — 99211 OFF/OP EST MAY X REQ PHY/QHP: CPT

## 2025-03-19 RX ORDER — FLUTICASONE PROPIONATE AND SALMETEROL 100; 50 UG/1; UG/1
1 POWDER RESPIRATORY (INHALATION) 2 TIMES DAILY
Qty: 60 EACH | Refills: 3 | Status: SHIPPED | OUTPATIENT
Start: 2025-03-19

## 2025-03-19 NOTE — CARDIAC REMOTE MONITOR - SCAN
Device transmission reviewed. Device demonstrated appropriate function.       Electronically Signed by: Rodney Alberto M.D.    3/19/2025  9:33 AM

## 2025-03-19 NOTE — PROGRESS NOTES
Target end date: Indefinite  Indication: AF  Drug: Eliquis 5 mg bid  CHADsVASC = 2  HAS-BLED = 0    Health Status Since Last Assessment  Patient in the ED for rectal bleeding on 3/12/25 - was found to have to have a UTI with blood in the urine sample. Patient was treated for pyelonephritis.   Pt denies any embolic events (stroke/tia/systemic embolism)      Adherence with DOAC Therapy  Pt has not missed doses in the average week.    Bleeding Risk Assessment  Pt denies epistaxis  Pt reports any hematuria - one time unsure if it was from rectal or vaginal area  Pt denies any excessive or unusual bleeding/hematomas.  Pt denies any GI bleeds or hematemesis.  Pt denies any concerning daily headache or subdural hematoma symptoms.    Latest Hemoglobin:  WNL, improving from previous  Hemoglobin   Date Value Ref Range Status   03/12/2025 12.2 12.0 - 16.0 g/dL Final     Latest Platelets: WNL  Platelet Count   Date Value Ref Range Status   03/12/2025 263 164 - 446 K/uL Final       Pt denies  ETOH overuse     Creatinine Clearance/Renal Function  Latest CrCl: >100 ml/min    Hepatic function  Latest LFTs: WNL  Pt denies any history of liver dysfunction        Drug Interactions  ASA/other antiplatelets: None  NSAID: None  Other drug interactions: None  Verified no anticonvulsant or azole therapy, education provided for future use.     Examination  Blood Pressure WNL  There were no vitals filed for this visit.    Symptomatic hypotension: Denies   Significant gait impairment/imbalance/high risk for falls? Patient uses walker, states no falls.     Final Assessment and Recommendations:  Patient appears stable from the anticoagulation standpoint.    Benefits of continued DOAC therapy outweigh risks for this patient  Recommend pt continue with current DOAC therapy: Eliquis 5 mg BID    DOAC is affordable     Other Actions: CMP/CBC hemogram ordered prior to next visit    Follow up:  Will follow up with patient 4 month(s).     Carrol Alberto  PharmD

## 2025-03-24 ENCOUNTER — APPOINTMENT (OUTPATIENT)
Dept: RADIOLOGY | Facility: IMAGING CENTER | Age: 50
End: 2025-03-24
Attending: NURSE PRACTITIONER
Payer: MEDICAID

## 2025-03-24 ENCOUNTER — HOSPITAL ENCOUNTER (EMERGENCY)
Facility: MEDICAL CENTER | Age: 50
End: 2025-03-25
Attending: EMERGENCY MEDICINE
Payer: MEDICAID

## 2025-03-24 ENCOUNTER — OFFICE VISIT (OUTPATIENT)
Dept: URGENT CARE | Facility: CLINIC | Age: 50
End: 2025-03-24
Payer: MEDICAID

## 2025-03-24 VITALS
WEIGHT: 264 LBS | TEMPERATURE: 97.9 F | BODY MASS INDEX: 48.58 KG/M2 | OXYGEN SATURATION: 96 % | RESPIRATION RATE: 24 BRPM | HEIGHT: 62 IN | HEART RATE: 62 BPM

## 2025-03-24 DIAGNOSIS — M54.50 LUMBAR PAIN: ICD-10-CM

## 2025-03-24 DIAGNOSIS — M54.32 SCIATICA OF LEFT SIDE: ICD-10-CM

## 2025-03-24 DIAGNOSIS — M54.41 ACUTE MIDLINE LOW BACK PAIN WITH BILATERAL SCIATICA: ICD-10-CM

## 2025-03-24 DIAGNOSIS — R32 URINARY INCONTINENCE, UNSPECIFIED TYPE: ICD-10-CM

## 2025-03-24 DIAGNOSIS — M54.42 ACUTE MIDLINE LOW BACK PAIN WITH BILATERAL SCIATICA: ICD-10-CM

## 2025-03-24 PROCEDURE — 99283 EMERGENCY DEPT VISIT LOW MDM: CPT

## 2025-03-24 PROCEDURE — 99215 OFFICE O/P EST HI 40 MIN: CPT | Performed by: NURSE PRACTITIONER

## 2025-03-24 PROCEDURE — 72100 X-RAY EXAM L-S SPINE 2/3 VWS: CPT | Mod: TC | Performed by: RADIOLOGY

## 2025-03-24 ASSESSMENT — FIBROSIS 4 INDEX
FIB4 SCORE: 0.78
FIB4 SCORE: 0.78

## 2025-03-24 ASSESSMENT — PAIN DESCRIPTION - PAIN TYPE: TYPE: ACUTE PAIN

## 2025-03-25 ENCOUNTER — APPOINTMENT (OUTPATIENT)
Dept: RADIOLOGY | Facility: MEDICAL CENTER | Age: 50
End: 2025-03-25
Attending: EMERGENCY MEDICINE
Payer: MEDICAID

## 2025-03-25 VITALS
OXYGEN SATURATION: 94 % | WEIGHT: 264 LBS | TEMPERATURE: 97.5 F | HEIGHT: 62 IN | BODY MASS INDEX: 48.58 KG/M2 | DIASTOLIC BLOOD PRESSURE: 51 MMHG | SYSTOLIC BLOOD PRESSURE: 125 MMHG | RESPIRATION RATE: 18 BRPM | HEART RATE: 65 BPM

## 2025-03-25 PROCEDURE — 72148 MRI LUMBAR SPINE W/O DYE: CPT

## 2025-03-25 NOTE — ED NOTES
Rounded on patient, resting with calm unlabored breathing. No further needs or concerns at this time.

## 2025-03-25 NOTE — ED NOTES
Bedside report received from off going RN/tech: Mario, assumed care of patient.  POC discussed with patient. Call light within reach, all needs addressed at this time.       Fall risk interventions in place: Move the patient closer to the nurse's station, Patient's personal possessions are with in their safe reach, Place socks on patient, and Place fall risk sign on patient's door (all applicable per [facility] Fall risk assessment)   Continuous monitoring: Not Applicable   IVF/IV medications: Not Applicable   Oxygen: Room Air  Bedside sitter: Not Applicable   Isolation: Not Applicable

## 2025-03-25 NOTE — DISCHARGE SUMMARY
"  ED Observation Discharge Summary    Patient:Lorene Millan  Patient : 1975  Patient MRN: [id number]  Patient PCP: Lili Reddy M.D.    Admit Date: 3/24/2025  Discharge Date and Time: 25 7:12 AM  Discharge Diagnosis: Lumbago, lumbar disc bulge  Discharge Attending: Fernando Ferraro M.D.  Discharge Service: ED Observation    ED Course  Lorene is a 49 y.o. female who was evaluated at [facility] for low back pain.  Patient with morbid obesity and history of disc bulge.  Patient did note some urinary incontinence that it occurred earlier tonight.  It is an isolated episode she had no perineal numbness or motor weakness on exam.  MRI was obtained to rule out cauda equina syndrome and showed a mild to moderate disc bulge in the lumbar region at L4.  No cauda equina present no effacement of the cord.  Patient's pain is well-controlled at this time and she has been advised to follow-up as an outpatient for further management.  Appropriate return precautions given    Discharge Exam:  /51   Pulse 65   Temp 36.3 °C (97.4 °F) (Temporal)   Resp 18   Ht 1.575 m (5' 2\")   Wt 120 kg (264 lb)   LMP  (LMP Unknown)   SpO2 94%   BMI 48.29 kg/m² .    Constitutional: Awake and alert. Nontoxic  HENT:  Grossly normal  Eyes: Grossly normal  Neck: Normal range of motion  Cardiovascular: Normal heart rate   Thorax & Lungs: No respiratory distress  Abdomen: Nontender  Skin:  No pathologic rash.   Extremities: Well perfused  Psychiatric: Affect normal    Labs  Results for orders placed or performed during the hospital encounter of 25   EKG    Collection Time: 25 10:20 PM   Result Value Ref Range    Report       [facility] Emergency Dept.    Test Date:  2025  Pt Name:    LORENE MILLAN                Department: ER  MRN:        [id number]                      Room:  Gender:     Female                       Technician: [id number]  :        1975                   Requested By:ER " TRIAGE PROTOCOL  Order #:    [id number]                    Reading MD:    Measurements  Intervals                                Axis  Rate:       64                           P:          55  MN:         134                          QRS:        33  QRSD:       114                          T:          -1  QT:         376  QTc:        388    Interpretive Statements  Sinus rhythm  Incomplete right bundle branch block  Low voltage, precordial leads  Compared to ECG 02/25/2025 12:10:58  Incomplete right bundle-branch block now present  Low QRS voltage now present  Atrial premature complex(es) no longer present  Right bundle-branch block no longer present       *Note: Due to a large number of results and/or encounters for the requested time period, some results have not been displayed. A complete set of results can be found in Results Review.       Radiology  MR-LUMBAR SPINE-W/O   Final Result         1. Stable mild L4-5 disc bulge.   2. No acute process or change.          Medications:   New Prescriptions    No medications on file       My final assessment includes lumbar disc bulge, back sprain  Upon Reevaluation, the patient's condition has: Improved; and will be discharged.    Patient discharged from ED Observation status at 7:12 AM (Time) 3/25/2025 (Date).     Total time spent on this ED Observation discharge encounter is < 30 Minutes    Electronically signed by: Fernando Ferraro M.D., 3/25/2025 7:12 AM

## 2025-03-25 NOTE — ED NOTES
Assume pt care, Pt provided meal tray. Given information sheet to contact [facility] for transport home.

## 2025-03-25 NOTE — ED PROVIDER NOTES
ED Provider Note    CHIEF COMPLAINT  Chief Complaint   Patient presents with    Low Back Pain     Pt reports lowe back pain radiating to BLE x 3 weeks. Pt reports 2 episodes of urinary incontinence today. Hx sciatic pain        EXTERNAL RECORDS REVIEWED  Other Imaging reviewed: MR lumbar spine from 2023 L4/L5 minor disc bulging without central or foraminal stenosis.    HPI/ROS  LIMITATION TO HISTORY   Select: : None  OUTSIDE HISTORIAN(S):  None    Lorene Haro is a 49 y.o. female with history of obesity, atrial fibrillation on Eliquis, bipolar disorder and schizophrenia to the emergency department for evaluation, congestive heart failure, COPD not on home oxygen, history of sciatica who presents with atraumatic back pain for the past 3-4 weeks.  Patient reports chronic right-sided sciatica, however over the past few weeks she has had sciatic symptoms in her bilateral lower extremities.  No fevers or chills.  No history of IV drug use.  No history of a bloodstream infection.  No paresthesias.  She reports 2 episodes of urinary incontinence earlier this morning.  No retention.  No abdominal pain.  No saddle anesthesia.    PAST MEDICAL HISTORY   has a past medical history of A-fib ([facility]), MONI (acute kidney injury) ([facility]) (08/09/2023), Asthma, Bipolar 2 disorder ([facility]), Chickenpox, Chronic obstructive pulmonary disease ([facility]), Congestive heart failure ([facility]), Hypertension, On home oxygen therapy, Other psychological stress, Schizophrenic disorder ([facility]), and Yeast infection of the skin (04/01/2021).    SURGICAL HISTORY   has a past surgical history that includes hysterectomy laparoscopy; colectomy; cholecystectomy; knee arthroscopy (Left); and orif, ankle (Left, 11/2/2024).    FAMILY HISTORY  Family History   Problem Relation Age of Onset    No Known Problems Mother     Cancer Sister        SOCIAL HISTORY  Social History     Tobacco Use    Smoking status: Some Days     Current packs/day: 0.00     Types: Cigarettes      "Last attempt to quit: 10/12/2022     Years since quittin.4    Smokeless tobacco: Never   Vaping Use    Vaping status: Never Used   Substance and Sexual Activity    Alcohol use: Not Currently    Drug use: Never    Sexual activity: Not Currently       CURRENT MEDICATIONS  Home Medications       Reviewed by Nohemy Garcia R.N. (Registered Nurse) on 25 at 2002  Med List Status: Partial     Medication Last Dose Status   apixaban (ELIQUIS) 5mg Tab  Active   aripiprazole (ABILIFY) 30 MG tablet  Active   ascorbic acid (VITAMIN C) 500 MG tablet  Active   atorvastatin (LIPITOR) 20 MG Tab  Active   benzonatate (TESSALON) 100 MG Cap  Active   cyclobenzaprine (FLEXERIL) 10 mg Tab  Active   ferrous sulfate 325 (65 Fe) MG tablet  Active   fluticasone (FLONASE) 50 MCG/ACT nasal spray  Active   fluticasone-salmeterol (ADVAIR DISKUS) 100-50 MCG/ACT AEROSOL POWDER, BREATH ACTIVATED  Active   gabapentin (NEURONTIN) 300 MG Cap  Active   hydrOXYzine HCl (ATARAX) 25 MG Tab  Active   ketoconazole (NIZORAL) 2 % Cream  Active   lidocaine (LIDODERM) 5 % Patch  Active   metoprolol SR (TOPROL XL) 50 MG TABLET SR 24 HR  Active   montelukast (SINGULAIR) 10 MG Tab  Active   Vitamin D, Cholecalciferol, (CHOLECALCIFEROL) 25 MCG (1000 UT) Tab  Active                    ALLERGIES  Allergies   Allergen Reactions    Amoxicillin Rash and Itching     Tolerates cephalosporins, pt reports that she gets a rash all over her body and gets itchy    Penicillin G Rash and Itching     pt reports that she gets a rash all over her body and gets itchy       PHYSICAL EXAM  VITAL SIGNS: /66   Pulse 65   Temp 36.3 °C (97.4 °F) (Temporal)   Resp 20   Ht 1.575 m (5' 2\")   Wt 120 kg (264 lb)   LMP  (LMP Unknown)   SpO2 96%   BMI 48.29 kg/m²    General: Well-appearing, no acute distress.   Eyes: EOM grossly intact BL.  HENT: MMM.   Neck: Normal ROM.   Lungs: Non-labored breathing.  Cardiac: Regular rate and rhythm.   Abdomen: Soft, " non-tender, non-distended. No rebound or guarding.  No mass.  MSK: Symmetric movement of all extremities.  Skin: No rashes, lesions, bruising, or petechiae. Well-perfused.   Neuro: Grossly nonfocal neurologic exam. Face symmetric. Normal mentation.  5/5 strength in the bilateral lower extremities.  Intact sensation. 2+/4 patellar reflexes.  Ambulates with a walker.  Spine: Midline L spine tenderness.     EKG/LABS  None     RADIOLOGY/PROCEDURES   I have independently interpreted the diagnostic imaging associated with this visit and am waiting the final reading from the radiologist.     My preliminary interpretation is as follows:   MR lumbar spine pending.     Radiologist interpretation:  MR-LUMBAR SPINE-W/O    (Results Pending)     COURSE & MEDICAL DECISION MAKING    ASSESSMENT, COURSE AND PLAN  Care Narrative:   Lorene Haro is a 49 y.o. female with history of obesity, atrial fibrillation on Eliquis, bipolar disorder and schizophrenia to the emergency department for evaluation, congestive heart failure, COPD not on home oxygen, history of sciatica who presents with atraumatic back pain for the past 3-4 weeks.  Patient reports and pain in her bilateral lower extremities, as well as some subjective weakness.  She had 2 episodes of incontinence earlier today.    23:40PM: On my assessment, ABCs are intact.  Vital signs are within normal limits.  She appears comfortable.  Nontoxic.  She has midline lumbar spine tenderness.  She has full 5/5 strength in her bilateral lower extremities, intact sensation, 2+/4 bilateral patellar reflexes.  I watched her ambulate to the bathroom with her walker.  Overall my suspicion for cord compression syndrome is low, however I am concerned that she had 2 reported episodes of incontinence.  Plan to obtain MRI of the lumbar spine to evaluate for a central canal and foraminal stenosis.    01:30AM: Patient was signed out to the oncoming ED physician Dr. Ferraro in stable condition pending  MR imaging.  If negative, anticipate she will be discharged.    ED OBS: Yes; I am placing the patient in to an observation status due to a diagnostic uncertainty as well as therapeutic intensity. Patient placed in observation status at 12:02 AM, 3/25/2025.     Observation plan is as follows: Pending MRI.    ADDITIONAL PROBLEMS MANAGED  N/A    DISPOSITION AND DISCUSSIONS  I have discussed management of the patient with the following physicians and JAY's:  None    Discussion of management with other QHP or appropriate source(s): None     Escalation of care considered, and ultimately not performed:Laboratory analysis    Barriers to care at this time, including but not limited to:  None .     Decision tools and prescription drugs considered including, but not limited to:  N/A .    FINAL DIAGNOSIS  1. Acute midline low back pain with bilateral sciatica Acute   2. Urinary incontinence, unspecified type Acute        Electronically signed by: Lisa Mott D.O., 3/24/2025 11:41 PM

## 2025-03-25 NOTE — PROGRESS NOTES
Subjective:   Lorene Haro is a 49 y.o. female who presents for Ankle Pain (SX Oct 2024, L ankle)      HPI  Patient is a 49-year-old female was brought into the urgent care via room as a.  Evidently patient was ambulating started noticing a pain in her back radiating into her pelvis and down her leg she started experiencing weakness in the lower extremities causing it difficult to walk.  Patient called EMS which she was transported here to be evaluated in the urgent care.  Patient having no acute trauma or injury does suffer from back pain.  She denies pain in her left ankle at this time however EMS did evaluate and wrapped it with an Ace bandage.  She had no injury to the left ankle.  Patient typically ambulates with a walker however unable to get up out of the wheelchair.  Review of Systems   Musculoskeletal:  Positive for joint pain.       Medications:    apixaban Tabs  aripiprazole  ascorbic acid  atorvastatin Tabs  benzonatate Caps  cyclobenzaprine Tabs  ferrous sulfate  fluticasone  fluticasone-salmeterol Aepb  gabapentin Caps  hydrOXYzine HCl Tabs  ketoconazole Crea  lidocaine Ptch  metoprolol SR Tb24  montelukast Tabs  Vitamin D (Cholecalciferol) Tabs    Allergies: Amoxicillin and Penicillin g    Problem List: Lorene Haro does not have any pertinent problems on file.    Surgical History:  Past Surgical History:   Procedure Laterality Date    ORIF, ANKLE Left 11/2/2024    Procedure: ORIF, ANKLE- TALUS;  Surgeon: Roman Rosa M.D.;  Location: SURGERY [facility];  Service: Orthopedics    CHOLECYSTECTOMY      COLECTOMY      HYSTERECTOMY LAPAROSCOPY      KNEE ARTHROSCOPY Left        Past Social Hx: Lorene Haro  reports that she has been smoking cigarettes. She has never used smokeless tobacco. She reports that she does not currently use alcohol. She reports that she does not use drugs.     Past Family Hx:  Lorene Haro family history includes Cancer in her sister; No Known Problems in  "her mother.     Problem list, medications, and allergies reviewed by myself today in Epic.     Objective:     Pulse 62   Temp 36.6 °C (97.9 °F) (Temporal)   Resp (!) 24   Ht 1.575 m (5' 2\")   Wt 120 kg (264 lb)   LMP  (LMP Unknown)   SpO2 96%   BMI 48.29 kg/m²     Physical Exam  Constitutional:       Appearance: Normal appearance. She is not ill-appearing or toxic-appearing.   HENT:      Head: Normocephalic.      Right Ear: External ear normal.      Left Ear: External ear normal.      Nose: Nose normal.      Mouth/Throat:      Lips: Pink.   Eyes:      General: Lids are normal.   Pulmonary:      Effort: Pulmonary effort is normal. No accessory muscle usage.   Musculoskeletal:      Cervical back: Normal and full passive range of motion without pain.      Lumbar back: Spasms, tenderness and bony tenderness present. Decreased range of motion. Negative right straight leg raise test and negative left straight leg raise test.      Comments: Difficult to fully assess as patient unable to stand from wheelchair due to weakness in lower extremities   Neurological:      Mental Status: She is alert and oriented to person, place, and time.   Psychiatric:         Mood and Affect: Mood normal.         Thought Content: Thought content normal.         Assessment/Plan:     Diagnosis and associated orders:     1. Lumbar pain  DX-LUMBAR SPINE-2 OR 3 VIEWS      2. Sciatica of left side               Comments/MDM:     I independently reviewed the patient's imaging and agree with the interpretation of the radiologist.    Normal complete lumbar spine series.     Patient is a 49-year-old female who was brought into the urgent care via EMS.  Evidently patient started experiencing weakness in her lower extremities which prompted her to call 911.  In the urgent care setting patient unable to ambulate, typically walks with a walker however unable to get up out of the wheelchair for this I feel the patient requires a higher level of care " including closer monitoring, stat lab work and/or imaging for further evaluation for complaints of...This has been discussed with the patient and they state agreement and understanding.  I offered the patient an ambulance ride and  the patient is declining at this time. The patient is in no acute distress upon clinic departure and will go directly to ED without delay.                  Please note that this dictation was created using voice recognition software. I have made a reasonable attempt to correct obvious errors, but I expect that there are errors of grammar and possibly content that I did not discover before finalizing the note.    This note was electronically signed by Nicola SHRESTHA.

## 2025-03-25 NOTE — ED NOTES
Patient back from MRI, ambulated with steady gait to restroom, back to bed. No further needs or concerns at this time.

## 2025-03-25 NOTE — ED TRIAGE NOTES
"Chief Complaint   Patient presents with    Low Back Pain     Pt reports lowe back pain radiating to BLE x 3 weeks. Pt reports 2 episodes of urinary incontinence today. Hx sciatic pain      /72   Pulse 69   Temp 36.3 °C (97.4 °F) (Temporal)   Resp 18   Ht 1.575 m (5' 2\")   Wt 120 kg (264 lb)   LMP  (LMP Unknown)   SpO2 96%   BMI 48.29 kg/m²     BIBA from [facility] . Per EMS,  pt spend all day in UC even after being evaluated by MD there. EMS called by  staff.     Pt uses walker at baseline, reports difficulty getting around due to pain   "

## 2025-03-31 NOTE — ED NOTES
Per PAT message below; need to call patient to reschedule. Case message was sent to cancel procedure.    Patient is on the schedule with Dr. Frye on 4/18/2025 and pre PCP patient will be out of town and will need to rescheduled 4/18 to a different day.  Please call patient to follow up and discuss further.       Left message #1 for patient to call us back to reschedule.   Phlebotomy called for lab draw

## 2025-03-31 NOTE — CARE PLAN
Problem: Knowledge Deficit - Standard  Goal: Patient and family/care givers will demonstrate understanding of plan of care, disease process/condition, diagnostic tests and medications  Outcome: Progressing   Pt educated on POC. Oral hygiene, IS and mobility.  Problem: Knowledge Deficit - COPD  Goal: Patient/significant other demonstrates understanding of disease process, utilization of the Action Plan, medications and discharge instruction  Outcome: Progressing     Problem: Ineffective Airway Clearance  Goal: Patient will maintain patent airway with clear/clearing breath sounds  Outcome: Progressing  Pt is able to clear airway and has strong cough reflux.   The patient is Watcher - Medium risk of patient condition declining or worsening    Shift Goals  Clinical Goals: Maintain adequate respiratory status, RT treatments  Patient Goals: Rest  Family Goals: ARAM    Progress made toward(s) clinical / shift goals:  Pt educated on POC. All questions answered. Pt ambulating to bathroom with 1 person assist. Pt is able to use IS effectively.     Patient is not progressing towards the following goals:       "UMBILICUS: Remove old outer dressing and strip packing. Cleanse wound with wound cleanser or Normal saline and gauze. Pat dry. Prep area around wound with no sting skin barrier. Re-Pack wound using cotton tip applicator and one continuous piece of 1/4\" silver strip packing (Ensure a small tail is left outside of wound as a wick for drainage and to ensure easy removal). Secure in place with an adhesive foam or non-adhesive foam and hypafix tape. Complete every day and/or as needed for dislodgement and or drainage saturation.       "

## 2025-04-01 NOTE — DISCHARGE PLANNING
RN notified KETTY Pt is medically cleared and can return to [facility]. Pt requires WC and 4L of Oxygen. Pt has Medicaid , [facility] does not have WC transport times this late.     [facility]  Time 1830  Trip ID # [id number]  Cost $119.25    KETTY notified RN  Transfer Packet with COBRA given to RN.    [de-identified] : congestion headache fever  [FreeTextEntry6] : presents with new onset illness with nasal congestion rhinorrhea headache and sore throat temp of 100 today and a barky cough

## 2025-04-18 ENCOUNTER — OFFICE VISIT (OUTPATIENT)
Dept: SLEEP MEDICINE | Facility: MEDICAL CENTER | Age: 50
End: 2025-04-18
Payer: MEDICAID

## 2025-04-18 VITALS
HEIGHT: 61 IN | OXYGEN SATURATION: 93 % | RESPIRATION RATE: 16 BRPM | HEART RATE: 60 BPM | BODY MASS INDEX: 50.18 KG/M2 | DIASTOLIC BLOOD PRESSURE: 88 MMHG | WEIGHT: 265.8 LBS | SYSTOLIC BLOOD PRESSURE: 128 MMHG

## 2025-04-18 DIAGNOSIS — E66.2 OBESITY HYPOVENTILATION SYNDROME (HCC): ICD-10-CM

## 2025-04-18 DIAGNOSIS — Z78.9 LIVES IN GROUP HOME: ICD-10-CM

## 2025-04-18 DIAGNOSIS — G47.33 OSA (OBSTRUCTIVE SLEEP APNEA): ICD-10-CM

## 2025-04-18 PROCEDURE — 3079F DIAST BP 80-89 MM HG: CPT

## 2025-04-18 PROCEDURE — 99213 OFFICE O/P EST LOW 20 MIN: CPT

## 2025-04-18 PROCEDURE — 3074F SYST BP LT 130 MM HG: CPT

## 2025-04-18 ASSESSMENT — FIBROSIS 4 INDEX: FIB4 SCORE: 0.78

## 2025-04-18 NOTE — PROGRESS NOTES
[facility] Follow-up Visit    CC:   Chief Complaint   Patient presents with    Follow-Up     Last Office Visit 3/7/2025 with Arabella SHRESTHA             HPI:    Lorene Haro is a 49 y.o.female present today for ongoing management of  KATHIA, OHS.  Patient is also followed by pulmonary for asthma-COPD overlap syndrome and restrictive lung disease.  Past medical history is also significant for severe obesity, schizophrenia, respiratory failure, CHF, atrial fibrillation, cardiomegaly. Patient lives in a group home ([facility]) -- caregiver is not present at today's visit. Patient uses 2L O2 nightly. Patient takes a nap once in a while. If she naps she does use the O2 as well.  Patient was last study results by sleep medicine 3/7/25 by myself. The primary reason for today's visit was to check to see how transitioning to iVAPS was going.  At her last visit patient had not received AVAPS machine since it had been ordered which was in November 2024.  Our office contacted the patient's DME company [employer] who stated that they were able to issue the machine but had been unable to get in contact with the patient.  Patient today states that she contacted [employer] to ask about the machine and they said they needed additional information from our office.  We were never contacted by the patient for [employer] since previous visit.  Discussed sending orders for the machine to a new DME company to assist in obtaining the machine and initiating therapy.  Patient is agreeable.    DME provider: [facility] for O2. Discussed using [employer] for machine        Sleep History  12/11/18 - PSG .1  Severe nocturnal desaturation - marina saturation 79 % - saturations <90% for 50.6% of the diagnostic recording. Recommend the patient return for a dedicated full night positive airway pressure titration.  Consider IVAPS.  2/24/19 - PSG VAPS titration, ineffective AVAPS titration. No recommendations can be extrapolated from the study  results.   8/8/19 - OPO on AVAPS EPAP 11 cm IPAP 25 cm O2 at 2L/min. The total analyzed time was 2 hrs 32 min. O2 Sat. marina was 88% and mean O2 sat was 90 % and baseline O2 at 92%  7/3/22 - PSG VAPS titration, Rec: AVAPS EPAP 11 pressure support 4-14, backup rate 14 breaths/min, TI 1, rise time 1, tidal volume 450 mL.  6/26/24 - PSG VAPS titration. Rec:  iVAPS  Target VA: 5.6, Tgt MN: 18, EPAP: 16, PS: 4/14, Max pressure 30 cmH2O, Height: 155cm , Rise Time: 300, Ti min: 0.3, Ti max: 2.0, Trigger: High, and Cycle: medium.     Patient Active Problem List    Diagnosis Date Noted    BMI 50.0-59.9, adult ([facility]) 11/02/2024    Talus fracture 10/31/2024    Closed left ankle fracture, initial encounter 10/31/2024    Cirrhosis ([facility]) 09/21/2024    COPD exacerbation ([facility]) 09/20/2024    Acute on chronic systolic heart failure ([facility]) 09/20/2024    Vertigo 08/19/2024    Tobacco abuse 08/10/2024    ACS (acute coronary syndrome) ([facility]) 08/10/2024    Chest pain, likely musculoskeletal 07/30/2024    Advance care planning 07/30/2024    Pulmonary nodules 05/30/2024    Acute hypoxemic respiratory failure ([facility]) 05/20/2024    Blood in stool 05/20/2024    Intractable back pain 03/25/2024    Chronic respiratory failure with hypoxia ([facility]) 03/25/2024    Ligamentum flavum hypertrophy 11/04/2023    Protrusion of lumbar intervertebral disc 11/04/2023    Acute chest pain 10/12/2023    Cellulitis 08/09/2023    Postural dizziness with presyncope 08/01/2023    Mixed restrictive and obstructive lung disease ([facility]) 01/26/2023    Dependence on nocturnal oxygen therapy 01/26/2023    Oropharyngeal dysphagia 11/08/2022    Stasis dermatitis of both legs 11/07/2022    Shortness of breath + Somnolence 11/06/2022    Gastroesophageal reflux disease without esophagitis 11/06/2022    Insomnia 11/06/2022    Hyperglycemia 11/06/2022    Acute respiratory failure with hypoxia and hypercapnia (HCC) 10/24/2022    Class 3 severe obesity in adult 10/24/2022    Asthma-COPD  overlap syndrome (HCC) 10/23/2022    Status post placement of implantable loop recorder 10/23/2022    Atypical pneumonia 10/05/2022    Elevated alkaline phosphatase level 10/01/2022    Restrictive lung disease secondary to obesity 10/01/2022    ?Asthma 09/02/2022    Sinus pause 09/02/2022    Elevated LFTs 09/02/2022    Prediabetes 08/29/2022    Acute respiratory failure due to COVID-19 ([facility]) 08/26/2022    Chest pain, rule out acute myocardial infarction 02/26/2022    Secondary hypercoagulable state ([facility]) 11/24/2021    Pneumonia due to COVID-19 virus 10/07/2021    Pneumonia 10/06/2021    Acute respiratory failure with hypoxia ([facility]) 10/06/2021    Nail overgrowth 08/04/2021    Severe persistent asthma without complication 07/26/2021    Obesity hypoventilation syndrome ([facility]) 07/23/2021    Hyperlipidemia 06/17/2021    Yeast infection of the skin 04/01/2021    Lower extremity edema 04/01/2021    Anxiety 04/01/2021    Blood glucose elevated 03/21/2021    Elevated brain natriuretic peptide (BNP) level 03/19/2021    AF (atrial fibrillation) ([facility]) 03/19/2021    Pain in the chest 01/18/2021    Schizophrenia ([facility]) 01/18/2021    Tobacco abuse, in remission 01/06/2021    Morbid obesity ([facility]) 11/18/2019    Macrocytic anemia 11/18/2019    Acute bronchitis 11/17/2019    Leukocytosis 11/17/2019    Iron deficiency anemia 11/18/2018    KATHIA (obstructive sleep apnea) 11/15/2018    Reactive airway disease with acute exacerbation 11/13/2018    ?possible Schizophrenia vs Bipolar? 11/13/2018    Cardiomegaly 11/13/2018       Past Medical History:   Diagnosis Date    A-fib ([facility])     MONI (acute kidney injury) ([facility]) 08/09/2023    Asthma     Bipolar 2 disorder ([facility])     Chickenpox     Chronic obstructive pulmonary disease ([facility])     Congestive heart failure ([facility])     Hypertension     On home oxygen therapy     Other psychological stress     Schizophrenic disorder ([facility])     Yeast infection of the skin 04/01/2021        Past Surgical History:    Procedure Laterality Date    ORIF, ANKLE Left 11/2/2024    Procedure: ORIF, ANKLE- TALUS;  Surgeon: Roman Rosa M.D.;  Location: SURGERY [facility];  Service: Orthopedics    CHOLECYSTECTOMY      COLECTOMY      HYSTERECTOMY LAPAROSCOPY      KNEE ARTHROSCOPY Left        Family History   Problem Relation Age of Onset    No Known Problems Mother     Cancer Sister        Current Outpatient Medications   Medication Sig Dispense Refill    fluticasone-salmeterol (ADVAIR DISKUS) 100-50 MCG/ACT AEROSOL POWDER, BREATH ACTIVATED Inhale 1 Puff 2 times a day. 60 Each 3    benzonatate (TESSALON) 100 MG Cap Take 1 Capsule by mouth 3 times a day as needed for Cough. 60 Capsule 0    metoprolol SR (TOPROL XL) 50 MG TABLET SR 24 HR Take 1 Tablet by mouth every day. 90 Tablet 3    apixaban (ELIQUIS) 5mg Tab Take 1 Tablet by mouth 2 times a day. 60 Tablet 5    cyclobenzaprine (FLEXERIL) 10 mg Tab Take 1 Tablet by mouth 3 times a day as needed for Moderate Pain or Muscle Spasms. 15 Tablet 1    gabapentin (NEURONTIN) 300 MG Cap Take 1 Capsule by mouth 3 times a day.      hydrOXYzine HCl (ATARAX) 25 MG Tab Take 1 Tablet by mouth 3 times a day as needed for Itching.      Vitamin D, Cholecalciferol, (CHOLECALCIFEROL) 25 MCG (1000 UT) Tab Take 2 Tablets by mouth every day. 60 Tablet 0    ascorbic acid (VITAMIN C) 500 MG tablet Take 500 mg by mouth every day.      ferrous sulfate 325 (65 Fe) MG tablet Take 325 mg by mouth every 48 hours.      ketoconazole (NIZORAL) 2 % Cream Apply 1 Application topically 2 times a day.      lidocaine (LIDODERM) 5 % Patch Place 1 Patch on the skin every 12 hours.      montelukast (SINGULAIR) 10 MG Tab Take 1 Tablet by mouth every evening. 90 Tablet 3    fluticasone (FLONASE) 50 MCG/ACT nasal spray Administer 2 Sprays into each nostril every morning.      aripiprazole (ABILIFY) 30 MG tablet Take 1 Tablet by mouth every morning. Indications: Major Depressive Disorder 30 Tablet 0    atorvastatin (LIPITOR)  "20 MG Tab Take 1 Tablet by mouth at bedtime. 30 Tablet 0     No current facility-administered medications for this visit.        ALLERGIES: Amoxicillin and Penicillin g    ROS  Constitutional: Denies fevers, Denies weight changes  Ears/Nose/Throat/Mouth: Denies nasal congestion or sore throat   Cardiovascular: Denies chest pain  Respiratory: Denies shortness of breath, Denies cough  Gastrointestinal/Hepatic: Denies nausea, vomiting  Sleep: see HPI      PHYSICAL EXAM  /88 (BP Location: Right arm, Patient Position: Sitting, BP Cuff Size: Adult)   Pulse 60   Resp 16   Ht 1.549 m (5' 1\")   Wt 121 kg (265 lb 12.8 oz)   LMP  (LMP Unknown)   SpO2 93%   BMI 50.22 kg/m²   Constitutional: Alert, no distress, well-groomed.  Skin: Warm, dry, good turgor, no rashes in visible areas.  Eye: Equal, round and reactive, conjunctiva clear, lids normal.  ENMT: Lips without lesions, good dentition, moist mucous membranes.  Neck: Trachea midline, no masses, no thyromegaly.  Respiratory: moderate respiratory effort, no cough.  MSK: slowed gait, uses walker, moves all extremities.  Neuro: Grossly non-focal.   Psych: Alert and oriented x3, normal affect and mood.      Medical Decision Making   Assessment and Plan  The medical record was reviewed including Diagnostic and titration nocturnal polysomnogram's  and continuous nocturnal oximetry results.    1. KATHIA (obstructive sleep apnea)  2. Obesity hypoventilation syndrome (HCC)  3. Lives in group home  - Continue to use 2 L O2 nightly until getting machine.  Then bleed in O2 with use of machine.  -Poor communication since last visit after being instructed to reach out prior to this visit if machine was unable to be obtained.  Provided patient with card for new DME supplier [email or web address] as well as card for myself.  Advised to reach out to [employer] company in a few days call our office if she still has issues obtaining the machine.  Recommend close follow-up 1 to 2 weeks after starting " use of machine.  - Orders placed for mask and supplies and new machine   - Advised to reach out via MyChart with questions     KATHIA/PAP EDUCATION:  - Clean equipment frequently, and get new mask and supplies as allowed by insurance and DME.   - Avoid driving a motor vehicle when drowsy  - Patients with KATHIA are at increased risk of cardiovascular disease including coronary artery disease, systemic arterial hypertension, pulmonary arterial hypertension, cardiac arrythmias, and stroke. Positive airway pressure will favorably impact many of the adverse conditions and effects provoked by KATHIA.       Return in about 2 months (around 6/18/2025) for check in once using the machine 1-2 weeks.   - Recommend an earlier appointment, if significant treatment barriers develop.  - Patient to follow up with the appropriate healthcare practitioners for all other medical problems and issues.    Please note portions of this record was created using voice recognition software. I have made every reasonable attempt to correct obvious errors, but I expect that there are errors of grammar and possibly content I did not discover before finalizing the note.

## 2025-04-19 ENCOUNTER — NON-PROVIDER VISIT (OUTPATIENT)
Dept: CARDIOLOGY | Facility: MEDICAL CENTER | Age: 50
End: 2025-04-19
Payer: MEDICAID

## 2025-04-21 PROCEDURE — 93298 REM INTERROG DEV EVAL SCRMS: CPT | Mod: 26 | Performed by: STUDENT IN AN ORGANIZED HEALTH CARE EDUCATION/TRAINING PROGRAM

## 2025-04-22 ENCOUNTER — APPOINTMENT (OUTPATIENT)
Dept: RADIOLOGY | Facility: MEDICAL CENTER | Age: 50
End: 2025-04-22
Attending: EMERGENCY MEDICINE
Payer: MEDICAID

## 2025-04-22 ENCOUNTER — HOSPITAL ENCOUNTER (EMERGENCY)
Facility: MEDICAL CENTER | Age: 50
End: 2025-04-22
Attending: EMERGENCY MEDICINE
Payer: MEDICAID

## 2025-04-22 VITALS
HEART RATE: 70 BPM | TEMPERATURE: 98 F | SYSTOLIC BLOOD PRESSURE: 126 MMHG | WEIGHT: 260 LBS | OXYGEN SATURATION: 91 % | DIASTOLIC BLOOD PRESSURE: 59 MMHG | RESPIRATION RATE: 19 BRPM | BODY MASS INDEX: 49.13 KG/M2

## 2025-04-22 DIAGNOSIS — R20.0 NUMBNESS: ICD-10-CM

## 2025-04-22 DIAGNOSIS — M25.512 ACUTE PAIN OF LEFT SHOULDER: ICD-10-CM

## 2025-04-22 LAB
ANION GAP SERPL CALC-SCNC: 10 MMOL/L (ref 7–16)
BASOPHILS # BLD AUTO: 0.1 % (ref 0–1.8)
BASOPHILS # BLD: 0.01 K/UL (ref 0–0.12)
BUN SERPL-MCNC: 16 MG/DL (ref 8–22)
CALCIUM SERPL-MCNC: 9.1 MG/DL (ref 8.5–10.5)
CHLORIDE SERPL-SCNC: 107 MMOL/L (ref 96–112)
CO2 SERPL-SCNC: 26 MMOL/L (ref 20–33)
CREAT SERPL-MCNC: 0.88 MG/DL (ref 0.5–1.4)
EKG IMPRESSION: NORMAL
EOSINOPHIL # BLD AUTO: 0.15 K/UL (ref 0–0.51)
EOSINOPHIL NFR BLD: 1.3 % (ref 0–6.9)
ERYTHROCYTE [DISTWIDTH] IN BLOOD BY AUTOMATED COUNT: 52.7 FL (ref 35.9–50)
GFR SERPLBLD CREATININE-BSD FMLA CKD-EPI: 79 ML/MIN/1.73 M 2
GLUCOSE SERPL-MCNC: 125 MG/DL (ref 65–99)
HCT VFR BLD AUTO: 36.6 % (ref 37–47)
HGB BLD-MCNC: 11 G/DL (ref 12–16)
IMM GRANULOCYTES # BLD AUTO: 0.03 K/UL (ref 0–0.11)
IMM GRANULOCYTES NFR BLD AUTO: 0.3 % (ref 0–0.9)
LYMPHOCYTES # BLD AUTO: 2.17 K/UL (ref 1–4.8)
LYMPHOCYTES NFR BLD: 18.1 % (ref 22–41)
MCH RBC QN AUTO: 26.5 PG (ref 27–33)
MCHC RBC AUTO-ENTMCNC: 30.1 G/DL (ref 32.2–35.5)
MCV RBC AUTO: 88.2 FL (ref 81.4–97.8)
MONOCYTES # BLD AUTO: 0.65 K/UL (ref 0–0.85)
MONOCYTES NFR BLD AUTO: 5.4 % (ref 0–13.4)
NEUTROPHILS # BLD AUTO: 8.96 K/UL (ref 1.82–7.42)
NEUTROPHILS NFR BLD: 74.8 % (ref 44–72)
NRBC # BLD AUTO: 0 K/UL
NRBC BLD-RTO: 0 /100 WBC (ref 0–0.2)
PLATELET # BLD AUTO: 238 K/UL (ref 164–446)
PMV BLD AUTO: 11.2 FL (ref 9–12.9)
POTASSIUM SERPL-SCNC: 4.4 MMOL/L (ref 3.6–5.5)
RBC # BLD AUTO: 4.15 M/UL (ref 4.2–5.4)
SODIUM SERPL-SCNC: 143 MMOL/L (ref 135–145)
TROPONIN T SERPL-MCNC: 51 NG/L (ref 6–19)
WBC # BLD AUTO: 12 K/UL (ref 4.8–10.8)

## 2025-04-22 PROCEDURE — 93005 ELECTROCARDIOGRAM TRACING: CPT | Mod: TC

## 2025-04-22 PROCEDURE — 99284 EMERGENCY DEPT VISIT MOD MDM: CPT

## 2025-04-22 PROCEDURE — 71045 X-RAY EXAM CHEST 1 VIEW: CPT

## 2025-04-22 PROCEDURE — 80048 BASIC METABOLIC PNL TOTAL CA: CPT

## 2025-04-22 PROCEDURE — 84484 ASSAY OF TROPONIN QUANT: CPT

## 2025-04-22 PROCEDURE — 36415 COLL VENOUS BLD VENIPUNCTURE: CPT

## 2025-04-22 PROCEDURE — 93005 ELECTROCARDIOGRAM TRACING: CPT | Mod: TC | Performed by: EMERGENCY MEDICINE

## 2025-04-22 PROCEDURE — 85025 COMPLETE CBC W/AUTO DIFF WBC: CPT

## 2025-04-22 ASSESSMENT — FIBROSIS 4 INDEX: FIB4 SCORE: 0.78

## 2025-04-22 NOTE — ED TRIAGE NOTES
"Chief Complaint   Patient presents with    Numbness     Patient reports \"numby pain\" to her left shoulder x 20 minutes that radiates to the chest     Shoulder Pain     Back of the left shoulder radiating around to the front      Patient brought in by EMS from work   "

## 2025-04-22 NOTE — ED PROVIDER NOTES
"ER Provider Note    Scribed for David Kong M.d. by Aayush Calderon. 4/22/2025  3:41 PM    Primary Care Provider: Lili Reddy M.D.    CHIEF COMPLAINT  Chief Complaint   Patient presents with    Numbness     Patient reports \"numby pain\" to her left shoulder x 20 minutes that radiates to the chest     Shoulder Pain     Back of the left shoulder radiating around to the front      EXTERNAL RECORDS REVIEWED  Patient has had 9 ED visits this year. History of A fib followed by our anticoagulation clinic. Lab history shows chronic low level troponin elevations.    HPI/ROS  LIMITATION TO HISTORY   None noted     OUTSIDE HISTORIAN(S):  None noted     Lorene Haro is a 49 y.o. female who presents to the ED complaining of left shoulder \"numbness\" onset 45 minutes ago. She states she was \"making collages\" at the time. She states that this sensation radiates to her chest. Patient denies any recent trauma. Patient denies any recent stress or anxiety. No alleviating or exacerbating factors noted. No recent fevers or chills.     PAST MEDICAL HISTORY  Past Medical History:   Diagnosis Date    A-fib (HCC)     MONI (acute kidney injury) (HCC) 08/09/2023    Asthma     Bipolar 2 disorder (HCC)     Chickenpox     Chronic obstructive pulmonary disease (HCC)     Congestive heart failure (HCC)     Hypertension     On home oxygen therapy     Other psychological stress     Schizophrenic disorder (HCC)     Yeast infection of the skin 04/01/2021       SURGICAL HISTORY  Past Surgical History:   Procedure Laterality Date    ORIF, ANKLE Left 11/2/2024    Procedure: ORIF, ANKLE- TALUS;  Surgeon: Roman Rosa M.D.;  Location: SURGERY [facility];  Service: Orthopedics    CHOLECYSTECTOMY      COLECTOMY      HYSTERECTOMY LAPAROSCOPY      KNEE ARTHROSCOPY Left        FAMILY HISTORY  Family History   Problem Relation Age of Onset    No Known Problems Mother     Cancer Sister        SOCIAL HISTORY   reports that she has been smoking " cigarettes. She has never used smokeless tobacco. She reports that she does not currently use alcohol. She reports that she does not use drugs.    CURRENT MEDICATIONS  Discharge Medication List as of 4/22/2025  5:12 PM        CONTINUE these medications which have NOT CHANGED    Details   fluticasone-salmeterol (ADVAIR DISKUS) 100-50 MCG/ACT AEROSOL POWDER, BREATH ACTIVATED Inhale 1 Puff 2 times a day., Disp-60 Each, R-3, Normal      benzonatate (TESSALON) 100 MG Cap Take 1 Capsule by mouth 3 times a day as needed for Cough., Disp-60 Capsule, R-0, Normal      metoprolol SR (TOPROL XL) 50 MG TABLET SR 24 HR Take 1 Tablet by mouth every day., Disp-90 Tablet, R-3, Normal      apixaban (ELIQUIS) 5mg Tab Take 1 Tablet by mouth 2 times a day.This rx was submitted by a pharmacist working under a collaborative practice agreementDisp-60 Tablet, R-5, Normal      cyclobenzaprine (FLEXERIL) 10 mg Tab Take 1 Tablet by mouth 3 times a day as needed for Moderate Pain or Muscle Spasms., Disp-15 Tablet, R-1, Normal      gabapentin (NEURONTIN) 300 MG Cap Take 1 Capsule by mouth 3 times a day., No Print      hydrOXYzine HCl (ATARAX) 25 MG Tab Take 1 Tablet by mouth 3 times a day as needed for Itching., No Print      Vitamin D, Cholecalciferol, (CHOLECALCIFEROL) 25 MCG (1000 UT) Tab Take 2 Tablets by mouth every day., Disp-60 Tablet, R-0, Normal      ascorbic acid (VITAMIN C) 500 MG tablet Take 500 mg by mouth every day., Historical Med      ferrous sulfate 325 (65 Fe) MG tablet Take 325 mg by mouth every 48 hours., Historical Med      ketoconazole (NIZORAL) 2 % Cream Apply 1 Application topically 2 times a day., Historical Med      lidocaine (LIDODERM) 5 % Patch Place 1 Patch on the skin every 12 hours., Historical Med      montelukast (SINGULAIR) 10 MG Tab Take 1 Tablet by mouth every evening.3 month supply x 1 yearDisp-90 Tablet, R-3, Normal      fluticasone (FLONASE) 50 MCG/ACT nasal spray Administer 2 Sprays into each nostril every  morning., Historical Med      aripiprazole (ABILIFY) 30 MG tablet Take 1 Tablet by mouth every morning. Indications: Major Depressive Disorder, Disp-30 Tablet, R-0, Print Rx Paper      atorvastatin (LIPITOR) 20 MG Tab Take 1 Tablet by mouth at bedtime., Disp-30 Tablet, R-0, Print Rx Paper             ALLERGIES  Amoxicillin and Penicillin g    PHYSICAL EXAM  VITAL SIGNS: /65   Pulse 64   Temp 36.6 °C (97.9 °F)   Resp 20   Wt 118 kg (260 lb)   LMP  (LMP Unknown)   SpO2 95%   BMI 49.13 kg/m²   Pulse ox interpretation: I interpret this pulse ox as normal.  Constitutional: Alert, anxious.  HENT: No signs of trauma, Bilateral external ears normal, Nose normal.   Eyes: Conjunctiva normal, Non-icteric.   Neck: Normal range of motion, Supple, No stridor.   Lymphatic: No lymphadenopathy noted.   Cardiovascular: Regular rate and rhythm, no murmurs.   Thorax & Lungs: Normal breath sounds, No respiratory distress, No wheezing  Abdomen: Bowel sounds normal, Soft, No tenderness, No masses, No pulsatile masses. No peritoneal signs.  Skin: Warm, Dry, No erythema, No rash.     Extremities: Intact distal pulses, No edema, No cyanosis. Normal range of motion, normal strength and ROM strength sensation in affected extremity  Musculoskeletal: Good range of motion in all major joints. No or major deformities noted.   Neurologic: Alert ,Cranial nerves II through XII intact.  5 out of 5 strength with elbow flexion/extension, wrist flexion/extension,  bilaterally.  5 out of 5 strength with hip flexion/extension, knee flexion/extension, ankle flexion/plantarflexion, flexion/extension of the toes bilaterally.  Sensation intact to light touch x4.  Psychiatric: Anxious    DIAGNOSTIC STUDIES    Labs Reviewed   CBC WITH DIFFERENTIAL - Abnormal; Notable for the following components:       Result Value    WBC 12.0 (*)     RBC 4.15 (*)     Hemoglobin 11.0 (*)     Hematocrit 36.6 (*)     MCH 26.5 (*)     MCHC 30.1 (*)     RDW 52.7 (*)      Neutrophils-Polys 74.80 (*)     Lymphocytes 18.10 (*)     Neutrophils (Absolute) 8.96 (*)     All other components within normal limits   BASIC METABOLIC PANEL - Abnormal; Notable for the following components:    Glucose 125 (*)     All other components within normal limits   TROPONIN - Abnormal; Notable for the following components:    Troponin T 51 (*)     All other components within normal limits   ESTIMATED GFR        EKG:   I have independently interpreted this EKG  Results for orders placed or performed during the hospital encounter of 25   EKG   Result Value Ref Range    Report       [facility] Emergency Dept.    Test Date:  2025  Pt Name:    ADELINA MILLAN                Department: ER  MRN:        [id number]                      Room:        03  Gender:     Female                       Technician: [id number]  :        1975                   Requested By:ER TRIAGE PROTOCOL  Order #:    [id number]                    Reading MD:    Measurements  Intervals                                Axis  Rate:       60                           P:          45  NV:         125                          QRS:        70  QRSD:       123                          T:          1  QT:         389  QTc:        389    Interpretive Statements  Sinus rhythm  Supraventricular bigeminy  Right bundle branch block  Compared to ECG 2025 22:20:48  Atrial premature complex(es) now present  Right bundle-branch block now present  Incomplete right bundle-branch block no longer present       *Note: Due to a large number of results and/or encounters for the requested time period, some results have not been displayed. A complete set of results can be found in Results Review.     Labs and EKG independently interpreted by me.    COURSE & MEDICAL DECISION MAKING     INITIAL ASSESSMENT, COURSE AND PLAN  Care Narrative:     CHEST PAIN:   HEART Score for Major Cardiac Events  HEART Score     History: Slightly  suspicious  ECG: Non-specific repolarization disturbance  Age: 45-64  Risk Factors: 1-2 risk factors  Troponin: 1-3 times normal limit    Heart Score: 4    Total Score   0-3 Points = Low Score, risk of MACE 0.9-1.7%.  4-6 Points = Moderate Score, risk of MACE 12-16.6%  7-10 Points = High Score, risk of MACE 50-65%    3:41 PM Patient presents to the ED with left shoulder numbness. Per triage protocol, EKG was ordered. Patient evaluated at bedside and discussed plan of care, including plan to further evaluate. Patient will be treated as ordered. Differential diagnoses include but not limited to: ACS though this seems unlikely peripheral neuropathy cervical radiculopathy, Anxiety.    4:51 PM - I reevaluated the patient at bedside.  Her heart score is moderate at 4, but this will always be the case with her baseline troponin elevation.  However, her symptoms and EKG do not suggest acute ischemia.  She does have a history of visits for chest pain with no signs of acute ischemia and this seems to be in keeping with that past history.  I discussed the patient's diagnostic study results. I discussed plan for discharge and follow up as outlined below. The patient is stable for discharge at this time and will return for any new or worsening symptoms. Patient verbalizes understanding and support with my plan for discharge.      ADDITIONAL PROBLEM MANAGED  Acute anxiety, and history of significant psychiatric challenges complicate this visit.    DISPOSITION AND DISCUSSIONS  I have discussed management of the patient with the following physicians and JAY's:  None    Discussion of management with other Q or appropriate source(s): None     Escalation of care considered, and ultimately not performed: acute inpatient care management, however at this time, the patient is most appropriate for outpatient management.    Decision tools and prescription drugs considered including, but not limited to: Medication modification considered,  but patient is established with primary care, no indication for medication changes based on today's evaluation .    The patient will return for new or worsening symptoms and is stable at the time of discharge.    DISPOSITION:  Patient will be discharged home in stable condition.    FOLLOW UP:  No follow-up provider specified.    OUTPATIENT MEDICATIONS:  Discharge Medication List as of 4/22/2025  5:12 PM           FINAL DIAGNOSIS  1. Acute pain of left shoulder    2. Numbness          Aayush HARRIS (Scribe), am scribing for, and in the presence of, David Kong M.D..    Electronically signed by: Aayush Calderon ([place]), 4/22/2025    David HARRIS M.D. personally performed the services described in this documentation, as scribed by Aayush Calderon in my presence, and it is both accurate and complete.

## 2025-04-22 NOTE — DISCHARGE INSTRUCTIONS
Your tests here were reassuring.  There is no sign of a new or dangerous condition.  The exact cause of your symptoms is not clear, but there is no sign of a heart attack or infection or any dangerous cause.  It is safe to send you home to have you follow-up with your primary care provider, or return to the emergency department for severe or worsening symptoms.

## 2025-04-23 ASSESSMENT — HEART SCORE
AGE: 45-64
TROPONIN: 1-3 TIMES NORMAL LIMIT
HISTORY: SLIGHTLY SUSPICIOUS
RISK FACTORS: 1-2 RISK FACTORS
HEART SCORE: 4
ECG: NON-SPECIFIC REPOLARIZATION DISTURBANCE

## 2025-04-23 NOTE — ED NOTES
Discharge education provided. Patient verbalizes understanding. Patient A/0x4 and ambulatory to the lobby with a steady gait. MTM ride set up for patient. Patient discharged home to self care.

## 2025-04-28 ENCOUNTER — APPOINTMENT (OUTPATIENT)
Dept: RADIOLOGY | Facility: MEDICAL CENTER | Age: 50
End: 2025-04-28
Payer: MEDICAID

## 2025-04-28 ENCOUNTER — HOSPITAL ENCOUNTER (EMERGENCY)
Facility: MEDICAL CENTER | Age: 50
End: 2025-04-28
Attending: EMERGENCY MEDICINE
Payer: MEDICAID

## 2025-04-28 VITALS
OXYGEN SATURATION: 98 % | HEART RATE: 61 BPM | RESPIRATION RATE: 20 BRPM | SYSTOLIC BLOOD PRESSURE: 114 MMHG | TEMPERATURE: 98 F | DIASTOLIC BLOOD PRESSURE: 56 MMHG | WEIGHT: 265 LBS | BODY MASS INDEX: 50.07 KG/M2

## 2025-04-28 DIAGNOSIS — R07.9 CHEST PAIN, UNSPECIFIED TYPE: ICD-10-CM

## 2025-04-28 DIAGNOSIS — J06.9 UPPER RESPIRATORY TRACT INFECTION, UNSPECIFIED TYPE: ICD-10-CM

## 2025-04-28 LAB
ALBUMIN SERPL BCP-MCNC: 3.9 G/DL (ref 3.2–4.9)
ALBUMIN/GLOB SERPL: 1.1 G/DL
ALP SERPL-CCNC: 143 U/L (ref 30–99)
ALT SERPL-CCNC: 17 U/L (ref 2–50)
ANION GAP SERPL CALC-SCNC: 10 MMOL/L (ref 7–16)
AST SERPL-CCNC: 15 U/L (ref 12–45)
BASOPHILS # BLD AUTO: 0.2 % (ref 0–1.8)
BASOPHILS # BLD: 0.02 K/UL (ref 0–0.12)
BILIRUB SERPL-MCNC: 0.7 MG/DL (ref 0.1–1.5)
BUN SERPL-MCNC: 14 MG/DL (ref 8–22)
CALCIUM ALBUM COR SERPL-MCNC: 9.9 MG/DL (ref 8.5–10.5)
CALCIUM SERPL-MCNC: 9.8 MG/DL (ref 8.5–10.5)
CHLORIDE SERPL-SCNC: 106 MMOL/L (ref 96–112)
CO2 SERPL-SCNC: 28 MMOL/L (ref 20–33)
COMMENT 1642: NORMAL
CREAT SERPL-MCNC: 0.93 MG/DL (ref 0.5–1.4)
EKG IMPRESSION: NORMAL
EOSINOPHIL # BLD AUTO: 0.11 K/UL (ref 0–0.51)
EOSINOPHIL NFR BLD: 1.2 % (ref 0–6.9)
ERYTHROCYTE [DISTWIDTH] IN BLOOD BY AUTOMATED COUNT: 53.1 FL (ref 35.9–50)
FLUAV RNA SPEC QL NAA+PROBE: NEGATIVE
FLUBV RNA SPEC QL NAA+PROBE: NEGATIVE
GFR SERPLBLD CREATININE-BSD FMLA CKD-EPI: 74 ML/MIN/1.73 M 2
GLOBULIN SER CALC-MCNC: 3.5 G/DL (ref 1.9–3.5)
GLUCOSE SERPL-MCNC: 105 MG/DL (ref 65–99)
HCG SERPL QL: NEGATIVE
HCT VFR BLD AUTO: 40.1 % (ref 37–47)
HGB BLD-MCNC: 12 G/DL (ref 12–16)
IMM GRANULOCYTES # BLD AUTO: 0.06 K/UL (ref 0–0.11)
IMM GRANULOCYTES NFR BLD AUTO: 0.6 % (ref 0–0.9)
LYMPHOCYTES # BLD AUTO: 1.56 K/UL (ref 1–4.8)
LYMPHOCYTES NFR BLD: 16.5 % (ref 22–41)
MCH RBC QN AUTO: 26.9 PG (ref 27–33)
MCHC RBC AUTO-ENTMCNC: 29.9 G/DL (ref 32.2–35.5)
MCV RBC AUTO: 89.9 FL (ref 81.4–97.8)
MICROCYTES BLD QL SMEAR: ABNORMAL
MONOCYTES # BLD AUTO: 0.52 K/UL (ref 0–0.85)
MONOCYTES NFR BLD AUTO: 5.5 % (ref 0–13.4)
MORPHOLOGY BLD-IMP: NORMAL
NEUTROPHILS # BLD AUTO: 7.16 K/UL (ref 1.82–7.42)
NEUTROPHILS NFR BLD: 76 % (ref 44–72)
NRBC # BLD AUTO: 0 K/UL
NRBC BLD-RTO: 0 /100 WBC (ref 0–0.2)
NT-PROBNP SERPL IA-MCNC: 381 PG/ML (ref 0–125)
PLATELET # BLD AUTO: 243 K/UL (ref 164–446)
PLATELET BLD QL SMEAR: NORMAL
PMV BLD AUTO: 11.1 FL (ref 9–12.9)
POTASSIUM SERPL-SCNC: 4.4 MMOL/L (ref 3.6–5.5)
PROT SERPL-MCNC: 7.4 G/DL (ref 6–8.2)
RBC # BLD AUTO: 4.46 M/UL (ref 4.2–5.4)
RBC BLD AUTO: PRESENT
RSV RNA SPEC QL NAA+PROBE: NEGATIVE
SARS-COV-2 RNA RESP QL NAA+PROBE: NOTDETECTED
SODIUM SERPL-SCNC: 144 MMOL/L (ref 135–145)
TROPONIN T SERPL-MCNC: 45 NG/L (ref 6–19)
TROPONIN T SERPL-MCNC: 45 NG/L (ref 6–19)
WBC # BLD AUTO: 9.4 K/UL (ref 4.8–10.8)

## 2025-04-28 PROCEDURE — 99284 EMERGENCY DEPT VISIT MOD MDM: CPT

## 2025-04-28 PROCEDURE — 93005 ELECTROCARDIOGRAM TRACING: CPT | Mod: TC | Performed by: EMERGENCY MEDICINE

## 2025-04-28 PROCEDURE — 93005 ELECTROCARDIOGRAM TRACING: CPT | Mod: TC

## 2025-04-28 PROCEDURE — 84703 CHORIONIC GONADOTROPIN ASSAY: CPT

## 2025-04-28 PROCEDURE — 84484 ASSAY OF TROPONIN QUANT: CPT | Mod: 91

## 2025-04-28 PROCEDURE — 83880 ASSAY OF NATRIURETIC PEPTIDE: CPT

## 2025-04-28 PROCEDURE — 71045 X-RAY EXAM CHEST 1 VIEW: CPT

## 2025-04-28 PROCEDURE — 80053 COMPREHEN METABOLIC PANEL: CPT

## 2025-04-28 PROCEDURE — 36415 COLL VENOUS BLD VENIPUNCTURE: CPT

## 2025-04-28 PROCEDURE — 0241U HCHG SARS-COV-2 COVID-19 NFCT DS RESP RNA 4 TRGT ED POC: CPT

## 2025-04-28 PROCEDURE — 85025 COMPLETE CBC W/AUTO DIFF WBC: CPT

## 2025-04-28 ASSESSMENT — HEART SCORE
ECG: NORMAL
HISTORY: SLIGHTLY SUSPICIOUS
HEART SCORE: 4
TROPONIN: 1-3 TIMES NORMAL LIMIT
AGE: 45-64
RISK FACTORS: >2 RISK FACTORS OR HX OF ATHEROSCLEROTIC DISEASE

## 2025-04-28 ASSESSMENT — FIBROSIS 4 INDEX: FIB4 SCORE: 0.86

## 2025-04-28 NOTE — ED PROVIDER NOTES
ED Provider Note      ED PHYSICIAN NOTE    CHIEF COMPLAINT  Chief Complaint   Patient presents with    Chest Pain     Pt c/o Cp and sob increasing since yesterday     Shortness of Breath       Inpatient Notes patient was admitted  for back pain, she had MRI that showed mild to moderate disc bulge no cauda equina, ultimately discharged, also multiple emergency department visits including visits , noted to have chronic elevation in troponin    HPI/ROS  LIMITATION TO HISTORY   Select: : None  OUTSIDE HISTORIAN(S):  none    Lorene Haro is a 49 y.o. female who presents with cough and shortness of breath.  Patient reports that over the last day she has had some cough nonproductive as well as some intermittent shortness of breath.  She reports no fevers or chills.  She has had some runny nose as well.  Triage note states chest pain although patient states she is not having any chest pain to me.  She denies any leg pain or swelling, no abdominal pain, nausea vomiting or diarrhea.    PAST MEDICAL HISTORY  Past Medical History:   Diagnosis Date    A-fib (HCC)     MONI (acute kidney injury) (HCC) 2023    Asthma     Bipolar 2 disorder (HCC)     Chickenpox     Chronic obstructive pulmonary disease (HCC)     Congestive heart failure (HCC)     Hypertension     On home oxygen therapy     Other psychological stress     Schizophrenic disorder (HCC)     Yeast infection of the skin 2021       SOCIAL HISTORY  Social History     Tobacco Use    Smoking status: Some Days     Current packs/day: 0.00     Types: Cigarettes     Last attempt to quit: 10/12/2022     Years since quittin.5    Smokeless tobacco: Never   Vaping Use    Vaping status: Never Used   Substance Use Topics    Alcohol use: Not Currently    Drug use: Never       CURRENT MEDICATIONS  Home Medications       Reviewed by Yvrose Plummer R.N. (Registered Nurse) on 25 at [facility]  Med List Status: Not Addressed     Medication Last Dose Status    apixaban (ELIQUIS) 5mg Tab  Active   aripiprazole (ABILIFY) 30 MG tablet  Active   ascorbic acid (VITAMIN C) 500 MG tablet  Active   atorvastatin (LIPITOR) 20 MG Tab  Active   benzonatate (TESSALON) 100 MG Cap  Active   cyclobenzaprine (FLEXERIL) 10 mg Tab  Active   ferrous sulfate 325 (65 Fe) MG tablet  Active   fluticasone (FLONASE) 50 MCG/ACT nasal spray  Active   fluticasone-salmeterol (ADVAIR DISKUS) 100-50 MCG/ACT AEROSOL POWDER, BREATH ACTIVATED  Active   gabapentin (NEURONTIN) 300 MG Cap  Active   hydrOXYzine HCl (ATARAX) 25 MG Tab  Active   ketoconazole (NIZORAL) 2 % Cream  Active   lidocaine (LIDODERM) 5 % Patch  Active   metoprolol SR (TOPROL XL) 50 MG TABLET SR 24 HR  Active   montelukast (SINGULAIR) 10 MG Tab  Active   Vitamin D, Cholecalciferol, (CHOLECALCIFEROL) 25 MCG (1000 UT) Tab  Active                  Audit from Redirected Encounters    **Home medications have not yet been reviewed for this encounter**         ALLERGIES  Allergies   Allergen Reactions    Amoxicillin Rash and Itching     Tolerates cephalosporins, pt reports that she gets a rash all over her body and gets itchy    Penicillin G Rash and Itching     pt reports that she gets a rash all over her body and gets itchy       PHYSICAL EXAM  VITAL SIGNS: /56   Pulse 61   Temp 36.7 °C (98 °F) (Temporal)   Resp 20   Wt 120 kg (265 lb)   LMP  (LMP Unknown)   SpO2 98%   BMI 50.07 kg/m²    Constitutional: Awake and alert   HENT: Normal inspection, no signs of trauma  Eyes: Normal inspection, Pupils equal, non-icteric  Neck: Grossly normal range of motion. No stridor  Cardiovascular: Regular rate and rhythm, no murmurs.  Symmetric peripheral pulses at radial   Thorax & Lungs: No respiratory distress, No wheezing, No rales, No rhonchi, No chest tenderness.   Abdomen:  soft, non-distended, nontender, no mass  Skin: No obvious rash. Warm. Dry.   Back: No tenderness, No CVA tenderness.   Extremities: No cyanosis, no edema  Neurologic:  AO3, Grossly normal,  Psychiatric: Normal affect for situation        DIAGNOSTIC STUDIES / PROCEDURES  LABS/EKG  Results for orders placed or performed during the hospital encounter of 04/28/25   CBC with Differential    Collection Time: 04/28/25 10:38 AM   Result Value Ref Range    WBC 9.4 4.8 - 10.8 K/uL    RBC 4.46 4.20 - 5.40 M/uL    Hemoglobin 12.0 12.0 - 16.0 g/dL    Hematocrit 40.1 37.0 - 47.0 %    MCV 89.9 81.4 - 97.8 fL    MCH 26.9 (L) 27.0 - 33.0 pg    MCHC 29.9 (L) 32.2 - 35.5 g/dL    RDW 53.1 (H) 35.9 - 50.0 fL    Platelet Count 243 164 - 446 K/uL    MPV 11.1 9.0 - 12.9 fL    Neutrophils-Polys 76.00 (H) 44.00 - 72.00 %    Lymphocytes 16.50 (L) 22.00 - 41.00 %    Monocytes 5.50 0.00 - 13.40 %    Eosinophils 1.20 0.00 - 6.90 %    Basophils 0.20 0.00 - 1.80 %    Immature Granulocytes 0.60 0.00 - 0.90 %    Nucleated RBC 0.00 0.00 - 0.20 /100 WBC    Neutrophils (Absolute) 7.16 1.82 - 7.42 K/uL    Lymphs (Absolute) 1.56 1.00 - 4.80 K/uL    Monos (Absolute) 0.52 0.00 - 0.85 K/uL    Eos (Absolute) 0.11 0.00 - 0.51 K/uL    Baso (Absolute) 0.02 0.00 - 0.12 K/uL    Immature Granulocytes (abs) 0.06 0.00 - 0.11 K/uL    NRBC (Absolute) 0.00 K/uL    Microcytosis 1+    Comp Metabolic Panel    Collection Time: 04/28/25 10:38 AM   Result Value Ref Range    Sodium 144 135 - 145 mmol/L    Potassium 4.4 3.6 - 5.5 mmol/L    Chloride 106 96 - 112 mmol/L    Co2 28 20 - 33 mmol/L    Anion Gap 10.0 7.0 - 16.0    Glucose 105 (H) 65 - 99 mg/dL    Bun 14 8 - 22 mg/dL    Creatinine 0.93 0.50 - 1.40 mg/dL    Calcium 9.8 8.5 - 10.5 mg/dL    Correct Calcium 9.9 8.5 - 10.5 mg/dL    AST(SGOT) 15 12 - 45 U/L    ALT(SGPT) 17 2 - 50 U/L    Alkaline Phosphatase 143 (H) 30 - 99 U/L    Total Bilirubin 0.7 0.1 - 1.5 mg/dL    Albumin 3.9 3.2 - 4.9 g/dL    Total Protein 7.4 6.0 - 8.2 g/dL    Globulin 3.5 1.9 - 3.5 g/dL    A-G Ratio 1.1 g/dL   proBrain Natriuretic Peptide, NT    Collection Time: 04/28/25 10:38 AM   Result Value Ref Range     NT-proBNP 381 (H) 0 - 125 pg/mL   Troponin    Collection Time: 25 10:38 AM   Result Value Ref Range    Troponin T 45 (H) 6 - 19 ng/L   HCG Qual Serum    Collection Time: 25 10:38 AM   Result Value Ref Range    Beta-Hcg Qualitative Serum Negative Negative   ESTIMATED GFR    Collection Time: 25 10:38 AM   Result Value Ref Range    GFR (CKD-EPI) 74 >60 mL/min/1.73 m 2   PLATELET ESTIMATE    Collection Time: 25 10:38 AM   Result Value Ref Range    Plt Estimation Normal    MORPHOLOGY    Collection Time: 25 10:38 AM   Result Value Ref Range    RBC Morphology Present    PERIPHERAL SMEAR REVIEW    Collection Time: 25 10:38 AM   Result Value Ref Range    Peripheral Smear Review see below    DIFFERENTIAL COMMENT    Collection Time: 25 10:38 AM   Result Value Ref Range    Comments-Diff see below    EKG    Collection Time: 25 12:44 PM   Result Value Ref Range    Report       [facility] Emergency Dept.    Test Date:  2025  Pt Name:    ADELINA MILLAN                Department: ER  MRN:        [id number]                      Room:  Gender:     Female                       Technician: [id number]  :        [id number]                   Requested By:ER TRIAGE PROTOCOL  Order #:    [id number]                    Reading MD: AMANDA ZAMUDIO MD    Measurements  Intervals                                Axis  Rate:       54                           P:          52  AR:         140                          QRS:        39  QRSD:       115                          T:          2  QT:         411  QTc:        390    Interpretive Statements  Sinus bradycardia  Atrial premature complex  Incomplete right bundle branch block  Low voltage, precordial leads  Compared to ECG 2025 15:15:34  Low QRS voltage now present    Electronically Signed On 2025 12:44:47 PDT by AMANDA ZAMUDIO MD     POC CoV-2, FLU A/B, RSV by PCR    Collection Time: 25  1:17 PM   Result Value  Ref Range    POC Influenza A RNA, PCR Negative Negative    POC Influenza B RNA, PCR Negative Negative    POC RSV, by PCR Negative Negative    POC SARS-CoV-2, PCR NotDetected NotDetected   Troponins in two (2) hours    Collection Time: 04/28/25  2:17 PM   Result Value Ref Range    Troponin T 45 (H) 6 - 19 ng/L     *Note: Due to a large number of results and/or encounters for the requested time period, some results have not been displayed. A complete set of results can be found in Results Review.       I have independently interpreted this EKG as documented above    RADIOLOGY  I have independently interpreted the diagnostic imaging associated with this visit and am waiting the final reading from the radiologist.   My preliminary interpretation is as follows: No edema    DX-CHEST-PORTABLE (1 VIEW)   Final Result      No acute cardiac or pulmonary abnormalities are identified.            COURSE & MEDICAL DECISION MAKING    INITIAL ASSESSMENT, COURSE AND PLAN  Care Narrative: 12:42 PM  Patient presents with cough and shortness of breath, differential diagnosis includes pneumonia, viral infection, electrolyte or metabolic derangement, patient has no chest pain would be quite unusual for ACS with these symptoms, she is not tachycardic, on her baseline oxygen and without chest pain seems unlikely to be pulmonary embolus as well.  Awaiting for diagnostic labs and x-ray    3:25 PM  Patient is reevaluated, updated on results, she is comfortable with the discharge plan        Interventions  Medications - No data to display    Measures  CHEST PAIN:   HEART Score for Major Cardiac Events  HEART Score     History: Slightly suspicious  ECG: Normal  Age: 45-64  Risk Factors: >2 risk factors or hx of atherosclerotic disease  Troponin: 1-3 times normal limit    Heart Score: 4    Total Score   0-3 Points = Low Score, risk of MACE 0.9-1.7%.  4-6 Points = Moderate Score, risk of MACE 12-16.6%  7-10 Points = High Score, risk of MACE  50-65%       PROBLEM LIST  This is a pleasant 49-year-old female presenting with    # Cough and chest tightness.  This point seems most likely to be viral respiratory infection.  The nature of her symptoms does sound more infectious in nature, she has no focal infiltrate noted on her x-ray suggestive of pneumonia, is afebrile, no focal findings on pulmonary exam suggestive of pneumonia either.  Her ECG is unchanged, she does have a baseline elevation in her troponin that is also unchanged and no symptoms that would highly suggest ACS.  Seems unlikely to be pulmonary embolus given the more infectious nature of her symptoms, she is not tachycardic, she is on her baseline oxygen.  I discussed home care and return precautions with the patient        DISPOSITION AND DISCUSSIONS    Barriers to care at this time, including but not limited to:  none .     Prescription drugs considered and/or prescribed:   Considered opiate prescription, but nonnarcotic analgesic is most appropriate  Prescribed   New Prescriptions    No medications on file       Clinical decision guidelines/aides heart score as noted above    The patient will return for new or worsening symptoms and is stable at the time of discharge.    The patient is referred to a primary physician for blood pressure management, diabetic screening, and for all other preventative health concerns.      DISPOSITION:  Patient will be discharged home in stable condition.    FOLLOW UP:  Lili Reddy M.D.  [address]  [address]  [facility] [id number] [phone]    Schedule an appointment as soon as possible for a visit         OUTPATIENT MEDICATIONS:  New Prescriptions    No medications on file         FINAL DIAGNOSIS  1. Chest pain, unspecified type        2. Upper respiratory tract infection, unspecified type               This dictation was created using voice recognition software. The accuracy of the dictation is limited to the abilities of the software. I expect  there may be some errors of grammar and possibly content. The nursing notes were reviewed and certain aspects of this information were incorporated into this note.    Electronically signed by: Yanick Posada M.D., 4/28/2025

## 2025-04-28 NOTE — DISCHARGE INSTRUCTIONS
Your test today showed no dangerous cause for your symptoms, this is likely a viral infection and should improve over time.

## 2025-04-28 NOTE — ED TRIAGE NOTES
Pt to triage .  Chief Complaint   Patient presents with    Chest Pain     Pt c/o Cp and sob increasing since yesterday     Shortness of Breath

## 2025-04-28 NOTE — ED NOTES
PT in w/c assisted from ED lobby to [facility]. PT states she has been experiencing new onset of sternal chest pain since 0900 yesterday and persists at this time. PT states she has also felt dizzy and weak since the chest pain started. PT states chest pain is  non-radiating. PT states she has also had a productive cough for the past 2 days. PT reports no recent fevers. Allergies reviewed. PT placed on monitors. Chart up for ERP.

## 2025-04-28 NOTE — ED NOTES
Patient given discharge instructions and education. Verbalizes understanding. Given chance to ask questions. Patient educated on signs and symptoms to returned to ER, Educated patient they can return for any concerning symptoms. Patient states they have their belongings. Denies additional needs at this time. Reports pain is well controlled. Patient monitored every hour and PRN for safety and comfort. Patient ambulatory out of department with use of personal walker.

## 2025-04-29 ENCOUNTER — TELEPHONE (OUTPATIENT)
Dept: SLEEP MEDICINE | Facility: MEDICAL CENTER | Age: 50
End: 2025-04-29
Payer: MEDICAID

## 2025-04-29 NOTE — TELEPHONE ENCOUNTER
Patient called and was told by provider top let her know if DME has not contacted patient yet to set up getting new CPAP machine. Patient stated that it has been two weeks and neither [facility] of [employer] have reached out to the patient. Patient stated that she has left messages for both DME and nobody returns her calls. Patient is having difficulty breathing since she has been without a CPAP machine.   Please, call pt to update her with status and what she needs to do.

## 2025-05-01 NOTE — TELEPHONE ENCOUNTER
Called Chrissy from [employer] regarding pt order and was informed that two days ago the order was still in authorization. Chrissy stated that she already spoke to the pt earlier last week but will check the order again to see updates and will contact the pt with to keep the pt updated on the order.     Called pt to inform her of this and pt understood.    Possible cause of dyspnea on exertion

## 2025-05-08 ENCOUNTER — PHARMACY VISIT (OUTPATIENT)
Dept: PHARMACY | Facility: MEDICAL CENTER | Age: 50
End: 2025-05-08
Payer: COMMERCIAL

## 2025-05-08 ENCOUNTER — HOSPITAL ENCOUNTER (EMERGENCY)
Facility: MEDICAL CENTER | Age: 50
End: 2025-05-08
Attending: STUDENT IN AN ORGANIZED HEALTH CARE EDUCATION/TRAINING PROGRAM
Payer: MEDICAID

## 2025-05-08 ENCOUNTER — APPOINTMENT (OUTPATIENT)
Dept: RADIOLOGY | Facility: MEDICAL CENTER | Age: 50
End: 2025-05-08
Attending: STUDENT IN AN ORGANIZED HEALTH CARE EDUCATION/TRAINING PROGRAM
Payer: MEDICAID

## 2025-05-08 VITALS
RESPIRATION RATE: 18 BRPM | SYSTOLIC BLOOD PRESSURE: 144 MMHG | OXYGEN SATURATION: 91 % | WEIGHT: 275.8 LBS | BODY MASS INDEX: 50.75 KG/M2 | HEIGHT: 62 IN | HEART RATE: 75 BPM | DIASTOLIC BLOOD PRESSURE: 66 MMHG | TEMPERATURE: 97.9 F

## 2025-05-08 DIAGNOSIS — L03.032 CELLULITIS OF TOE OF LEFT FOOT: ICD-10-CM

## 2025-05-08 LAB
ALBUMIN SERPL BCP-MCNC: 4.2 G/DL (ref 3.2–4.9)
ALBUMIN/GLOB SERPL: 1.3 G/DL
ALP SERPL-CCNC: 126 U/L (ref 30–99)
ALT SERPL-CCNC: 11 U/L (ref 2–50)
ANION GAP SERPL CALC-SCNC: 11 MMOL/L (ref 7–16)
ANISOCYTOSIS BLD QL SMEAR: ABNORMAL
AST SERPL-CCNC: 12 U/L (ref 12–45)
BASOPHILS # BLD AUTO: 0.2 % (ref 0–1.8)
BASOPHILS # BLD: 0.02 K/UL (ref 0–0.12)
BILIRUB SERPL-MCNC: 0.5 MG/DL (ref 0.1–1.5)
BUN SERPL-MCNC: 17 MG/DL (ref 8–22)
CALCIUM ALBUM COR SERPL-MCNC: 9.5 MG/DL (ref 8.5–10.5)
CALCIUM SERPL-MCNC: 9.7 MG/DL (ref 8.5–10.5)
CHLORIDE SERPL-SCNC: 105 MMOL/L (ref 96–112)
CO2 SERPL-SCNC: 26 MMOL/L (ref 20–33)
COMMENT 1642: NORMAL
CREAT SERPL-MCNC: 1.06 MG/DL (ref 0.5–1.4)
EOSINOPHIL # BLD AUTO: 0.17 K/UL (ref 0–0.51)
EOSINOPHIL NFR BLD: 1.8 % (ref 0–6.9)
ERYTHROCYTE [DISTWIDTH] IN BLOOD BY AUTOMATED COUNT: 51.4 FL (ref 35.9–50)
GFR SERPLBLD CREATININE-BSD FMLA CKD-EPI: 63 ML/MIN/1.73 M 2
GLOBULIN SER CALC-MCNC: 3.3 G/DL (ref 1.9–3.5)
GLUCOSE SERPL-MCNC: 158 MG/DL (ref 65–99)
HCT VFR BLD AUTO: 39.2 % (ref 37–47)
HGB BLD-MCNC: 11.6 G/DL (ref 12–16)
IMM GRANULOCYTES # BLD AUTO: 0.03 K/UL (ref 0–0.11)
IMM GRANULOCYTES NFR BLD AUTO: 0.3 % (ref 0–0.9)
LACTATE SERPL-SCNC: 1.7 MMOL/L (ref 0.5–2)
LYMPHOCYTES # BLD AUTO: 2.08 K/UL (ref 1–4.8)
LYMPHOCYTES NFR BLD: 22 % (ref 22–41)
MCH RBC QN AUTO: 26.3 PG (ref 27–33)
MCHC RBC AUTO-ENTMCNC: 29.6 G/DL (ref 32.2–35.5)
MCV RBC AUTO: 88.9 FL (ref 81.4–97.8)
MICROCYTES BLD QL SMEAR: ABNORMAL
MONOCYTES # BLD AUTO: 0.48 K/UL (ref 0–0.85)
MONOCYTES NFR BLD AUTO: 5.1 % (ref 0–13.4)
MORPHOLOGY BLD-IMP: NORMAL
NEUTROPHILS # BLD AUTO: 6.67 K/UL (ref 1.82–7.42)
NEUTROPHILS NFR BLD: 70.6 % (ref 44–72)
NRBC # BLD AUTO: 0 K/UL
NRBC BLD-RTO: 0 /100 WBC (ref 0–0.2)
OVALOCYTES BLD QL SMEAR: NORMAL
PLATELET # BLD AUTO: 227 K/UL (ref 164–446)
PLATELET BLD QL SMEAR: NORMAL
PMV BLD AUTO: 11.3 FL (ref 9–12.9)
POIKILOCYTOSIS BLD QL SMEAR: NORMAL
POTASSIUM SERPL-SCNC: 4.1 MMOL/L (ref 3.6–5.5)
PROT SERPL-MCNC: 7.5 G/DL (ref 6–8.2)
RBC # BLD AUTO: 4.41 M/UL (ref 4.2–5.4)
RBC BLD AUTO: PRESENT
SODIUM SERPL-SCNC: 142 MMOL/L (ref 135–145)
WBC # BLD AUTO: 9.5 K/UL (ref 4.8–10.8)

## 2025-05-08 PROCEDURE — 87040 BLOOD CULTURE FOR BACTERIA: CPT

## 2025-05-08 PROCEDURE — 99284 EMERGENCY DEPT VISIT MOD MDM: CPT

## 2025-05-08 PROCEDURE — 85025 COMPLETE CBC W/AUTO DIFF WBC: CPT

## 2025-05-08 PROCEDURE — RXMED WILLOW AMBULATORY MEDICATION CHARGE: Performed by: STUDENT IN AN ORGANIZED HEALTH CARE EDUCATION/TRAINING PROGRAM

## 2025-05-08 PROCEDURE — 36415 COLL VENOUS BLD VENIPUNCTURE: CPT

## 2025-05-08 PROCEDURE — 80053 COMPREHEN METABOLIC PANEL: CPT

## 2025-05-08 PROCEDURE — 83605 ASSAY OF LACTIC ACID: CPT

## 2025-05-08 RX ORDER — SULFAMETHOXAZOLE AND TRIMETHOPRIM 800; 160 MG/1; MG/1
1 TABLET ORAL 2 TIMES DAILY
Qty: 10 TABLET | Refills: 0 | Status: ACTIVE | OUTPATIENT
Start: 2025-05-08 | End: 2025-05-13

## 2025-05-08 RX ORDER — CEPHALEXIN 500 MG/1
500 CAPSULE ORAL 4 TIMES DAILY
Qty: 20 CAPSULE | Refills: 0 | Status: ACTIVE | OUTPATIENT
Start: 2025-05-08 | End: 2025-05-13

## 2025-05-08 ASSESSMENT — FIBROSIS 4 INDEX: FIB4 SCORE: 0.76

## 2025-05-08 ASSESSMENT — PAIN DESCRIPTION - PAIN TYPE: TYPE: ACUTE PAIN

## 2025-05-09 NOTE — ED NOTES
Pt brought from lobby to [facility] [address] via wheelchair with personal walker. Pt is now sitting up in bed with even and unlabored breaths, in no apparent distress at this time. Will continue to monitor pt while awaiting orders.

## 2025-05-09 NOTE — ED NOTES
Called [facility] to get pt's d/c prescription filled and tubed to ER. Will d/c pt once medication arrives.

## 2025-05-09 NOTE — ED NOTES
Pt educated regarding discharge instructions and demonstrated understanding. Pt taken out to lobby via wheelchair and does not appear to be in any distress at this time. Pt's discharge paperwork, medications from pharmacy, and belongings sent home with pt.

## 2025-05-09 NOTE — ED PROVIDER NOTES
ER Provider Note    Scribed for Dr. Fernando Ferraro MD. by Eric Santamaria. 5/8/2025  9:56 PM    Primary Care Provider: Lili Reddy M.D.    CHIEF COMPLAINT  Chief Complaint   Patient presents with    Digit Pain           Foot Swelling     BIB ems. Pt is complaining of left big toe pain. She lost the nail today and saw pus underneath. She states the pain started on Monday. Her left foot and swollen, red, and tender. She is unsure if she has had any fevers.     EXTERNAL RECORDS REVIEWED  Inpatient Notes Reviewed admission note from 5/19/24 which reports that the patient has a past medical history notable for reactive airway disease, KATHIA on 2L nasal cannula at bedtime, paroxysmal afib, COPD, htn, and schizophrenia, being admitted at that time for acute on chronic hypoxic respiratory failure.     HPI/ROS  LIMITATION TO HISTORY   Select: : None    OUTSIDE HISTORIAN(S):  None    Lorene Haro is a 49 y.o. female who presents to the ED for evaluation of left big toe pain onset 4 days ago. The patient reports that earlier today the nail came off with pus present where the nail was. She notes swelling, redness, and pain to her left foot. Patient adds that she has not had any fever. She denies a history of diabetes.    PAST MEDICAL HISTORY  Past Medical History:   Diagnosis Date    A-fib (HCC)     MONI (acute kidney injury) (HCC) 08/09/2023    Asthma     Bipolar 2 disorder (HCC)     Chickenpox     Chronic obstructive pulmonary disease (HCC)     Congestive heart failure (HCC)     Hypertension     On home oxygen therapy     Other psychological stress     Schizophrenic disorder (HCC)     Yeast infection of the skin 04/01/2021     SURGICAL HISTORY  Past Surgical History:   Procedure Laterality Date    ORIF, ANKLE Left 11/2/2024    Procedure: ORIF, ANKLE- TALUS;  Surgeon: Roman Rosa M.D.;  Location: SURGERY [facility];  Service: Orthopedics    CHOLECYSTECTOMY      COLECTOMY      HYSTERECTOMY LAPAROSCOPY      KNEE  ARTHROSCOPY Left      FAMILY HISTORY  Family History   Problem Relation Age of Onset    No Known Problems Mother     Cancer Sister      SOCIAL HISTORY   reports that she has been smoking cigarettes. She has never used smokeless tobacco. She reports that she does not currently use alcohol. She reports that she does not use drugs.    CURRENT MEDICATIONS  Discharge Medication List as of 5/8/2025 10:35 PM        CONTINUE these medications which have NOT CHANGED    Details   fluticasone-salmeterol (ADVAIR DISKUS) 100-50 MCG/ACT AEROSOL POWDER, BREATH ACTIVATED Inhale 1 Puff 2 times a day., Disp-60 Each, R-3, Normal      benzonatate (TESSALON) 100 MG Cap Take 1 Capsule by mouth 3 times a day as needed for Cough., Disp-60 Capsule, R-0, Normal      metoprolol SR (TOPROL XL) 50 MG TABLET SR 24 HR Take 1 Tablet by mouth every day., Disp-90 Tablet, R-3, Normal      apixaban (ELIQUIS) 5mg Tab Take 1 Tablet by mouth 2 times a day.This rx was submitted by a pharmacist working under a collaborative practice agreementDisp-60 Tablet, R-5, Normal      cyclobenzaprine (FLEXERIL) 10 mg Tab Take 1 Tablet by mouth 3 times a day as needed for Moderate Pain or Muscle Spasms., Disp-15 Tablet, R-1, Normal      gabapentin (NEURONTIN) 300 MG Cap Take 1 Capsule by mouth 3 times a day., No Print      hydrOXYzine HCl (ATARAX) 25 MG Tab Take 1 Tablet by mouth 3 times a day as needed for Itching., No Print      Vitamin D, Cholecalciferol, (CHOLECALCIFEROL) 25 MCG (1000 UT) Tab Take 2 Tablets by mouth every day., Disp-60 Tablet, R-0, Normal      ascorbic acid (VITAMIN C) 500 MG tablet Take 500 mg by mouth every day., Historical Med      ferrous sulfate 325 (65 Fe) MG tablet Take 325 mg by mouth every 48 hours., Historical Med      ketoconazole (NIZORAL) 2 % Cream Apply 1 Application topically 2 times a day., Historical Med      lidocaine (LIDODERM) 5 % Patch Place 1 Patch on the skin every 12 hours., Historical Med      montelukast (SINGULAIR) 10 MG  "Tab Take 1 Tablet by mouth every evening.3 month supply x 1 yearDisp-90 Tablet, R-3, Normal      fluticasone (FLONASE) 50 MCG/ACT nasal spray Administer 2 Sprays into each nostril every morning., Historical Med      aripiprazole (ABILIFY) 30 MG tablet Take 1 Tablet by mouth every morning. Indications: Major Depressive Disorder, Disp-30 Tablet, R-0, Print Rx Paper      atorvastatin (LIPITOR) 20 MG Tab Take 1 Tablet by mouth at bedtime., Disp-30 Tablet, R-0, Print Rx Paper           ALLERGIES  Amoxicillin and Penicillin g    PHYSICAL EXAM  /63   Pulse 82   Temp 36.6 °C (97.9 °F) (Temporal)   Resp 16   Ht 1.575 m (5' 2\")   Wt (!) 125 kg (275 lb 12.7 oz)   LMP  (LMP Unknown)   SpO2 93%   BMI 50.44 kg/m²   Physical Exam  Vitals and nursing note reviewed.   Constitutional:       Appearance: She is well-developed.   HENT:      Head: Normocephalic.   Eyes:      Extraocular Movements: Extraocular movements intact.      Pupils: Pupils are equal, round, and reactive to light.   Cardiovascular:      Rate and Rhythm: Normal rate and regular rhythm.   Pulmonary:      Effort: Pulmonary effort is normal.      Breath sounds: Normal breath sounds.   Abdominal:      Palpations: Abdomen is soft.      Tenderness: There is no abdominal tenderness.   Musculoskeletal:      Cervical back: Normal range of motion.   Feet:      Comments: Erythema to dorsum of left great toe with diffuse soft tissue swelling. Toenail is completely removed and missing. No expressible drainage or purulence. No fluid collection or fluctuance.  Neurological:      Mental Status: She is alert and oriented to person, place, and time.       DIAGNOSTIC STUDIES & PROCEDURES    Labs:   Results for orders placed or performed during the hospital encounter of 05/08/25   Lactic Acid    Collection Time: 05/08/25  8:39 PM   Result Value Ref Range    Lactic Acid 1.7 0.5 - 2.0 mmol/L   CBC with Differential    Collection Time: 05/08/25  8:39 PM   Result Value Ref " Range    WBC 9.5 4.8 - 10.8 K/uL    RBC 4.41 4.20 - 5.40 M/uL    Hemoglobin 11.6 (L) 12.0 - 16.0 g/dL    Hematocrit 39.2 37.0 - 47.0 %    MCV 88.9 81.4 - 97.8 fL    MCH 26.3 (L) 27.0 - 33.0 pg    MCHC 29.6 (L) 32.2 - 35.5 g/dL    RDW 51.4 (H) 35.9 - 50.0 fL    Platelet Count 227 164 - 446 K/uL    MPV 11.3 9.0 - 12.9 fL    Neutrophils-Polys 70.60 44.00 - 72.00 %    Lymphocytes 22.00 22.00 - 41.00 %    Monocytes 5.10 0.00 - 13.40 %    Eosinophils 1.80 0.00 - 6.90 %    Basophils 0.20 0.00 - 1.80 %    Immature Granulocytes 0.30 0.00 - 0.90 %    Nucleated RBC 0.00 0.00 - 0.20 /100 WBC    Neutrophils (Absolute) 6.67 1.82 - 7.42 K/uL    Lymphs (Absolute) 2.08 1.00 - 4.80 K/uL    Monos (Absolute) 0.48 0.00 - 0.85 K/uL    Eos (Absolute) 0.17 0.00 - 0.51 K/uL    Baso (Absolute) 0.02 0.00 - 0.12 K/uL    Immature Granulocytes (abs) 0.03 0.00 - 0.11 K/uL    NRBC (Absolute) 0.00 K/uL    Anisocytosis 1+     Microcytosis 1+    Complete Metabolic Panel    Collection Time: 05/08/25  8:39 PM   Result Value Ref Range    Sodium 142 135 - 145 mmol/L    Potassium 4.1 3.6 - 5.5 mmol/L    Chloride 105 96 - 112 mmol/L    Co2 26 20 - 33 mmol/L    Anion Gap 11.0 7.0 - 16.0    Glucose 158 (H) 65 - 99 mg/dL    Bun 17 8 - 22 mg/dL    Creatinine 1.06 0.50 - 1.40 mg/dL    Calcium 9.7 8.5 - 10.5 mg/dL    Correct Calcium 9.5 8.5 - 10.5 mg/dL    AST(SGOT) 12 12 - 45 U/L    ALT(SGPT) 11 2 - 50 U/L    Alkaline Phosphatase 126 (H) 30 - 99 U/L    Total Bilirubin 0.5 0.1 - 1.5 mg/dL    Albumin 4.2 3.2 - 4.9 g/dL    Total Protein 7.5 6.0 - 8.2 g/dL    Globulin 3.3 1.9 - 3.5 g/dL    A-G Ratio 1.3 g/dL   Blood Culture - Draw one from central line and one from peripheral site    Collection Time: 05/08/25  8:39 PM    Specimen: Peripheral; Blood   Result Value Ref Range    Significant Indicator NEG     Source BLD     Site PERIPHERAL     Culture Result       No Growth  Note: Blood cultures are incubated for 5 days and  are monitored continuously.Positive blood  cultures  are called to the RN and reported as soon as  they are identified.     ESTIMATED GFR    Collection Time: 05/08/25  8:39 PM   Result Value Ref Range    GFR (CKD-EPI) 63 >60 mL/min/1.73 m 2   PLATELET ESTIMATE    Collection Time: 05/08/25  8:39 PM   Result Value Ref Range    Plt Estimation Normal    MORPHOLOGY    Collection Time: 05/08/25  8:39 PM   Result Value Ref Range    RBC Morphology Present     Poikilocytosis 1+     Ovalocytes 1+    PERIPHERAL SMEAR REVIEW    Collection Time: 05/08/25  8:39 PM   Result Value Ref Range    Peripheral Smear Review see below    DIFFERENTIAL COMMENT    Collection Time: 05/08/25  8:39 PM   Result Value Ref Range    Comments-Diff see below      *Note: Due to a large number of results and/or encounters for the requested time period, some results have not been displayed. A complete set of results can be found in Results Review.     All labs reviewed by me.    COURSE & MEDICAL DECISION MAKING    INITIAL ASSESSMENT AND PLAN  Care Narrative:       9:56 PM - Patient seen and evaluated at bedside.  49-year-old female presenting for left toe pain, swelling.  On exam the patient is missing the left toenail as it just came off.  There is no expressible drainage or purulent discharge however the patient reports having some.  The toe diffusely looks cellulitic there is no additional drainable fluid collection labs are overall reassuring to rule out systemic infection or sepsis.  Will place on antibiotics I have advised wound care precautions return precautions given for worsening appearance of wound        ADDITIONAL PROBLEM LIST AND DISPOSITION  Past Medical History:   Diagnosis Date    A-fib ([facility])     MONI (acute kidney injury) ([facility]) 08/09/2023    Asthma     Bipolar 2 disorder ([facility])     Chickenpox     Chronic obstructive pulmonary disease ([facility])     Congestive heart failure ([facility])     Hypertension     On home oxygen therapy     Other psychological stress     Schizophrenic disorder ([facility])      Yeast infection of the skin 04/01/2021                  DISPOSITION AND DISCUSSIONS  I have discussed management of the patient with the following physicians and JAY's: None    Discussion of management with other [facility] [place] or appropriate source(s): None     Escalation of care considered, and ultimately not performed: .    Barriers to care at this time, including but not limited to:  No known barriers to care .     Decision tools and prescription drugs considered including, but not limited to: Antibiotics Keflex prescribed for treatment of left foot cellulitis .    The patient will return for new or worsening symptoms and is stable at the time of discharge.    The patient is referred to a primary physician for blood pressure management, diabetic screening, and for all other preventative health concerns.    DISPOSITION:  Patient will be discharged home in stable condition.    FOLLOW UP:  No follow-up provider specified.    OUTPATIENT MEDICATIONS:  Discharge Medication List as of 5/8/2025 10:35 PM        START taking these medications    Details   cephALEXin (KEFLEX) 500 MG Cap Take 1 Capsule by mouth 4 times a day for 5 days., Disp-20 Capsule, R-0, Normal      sulfamethoxazole-trimethoprim (BACTRIM DS) 800-160 MG tablet Take 1 Tablet by mouth 2 times a day for 5 days., Disp-10 Tablet, R-0, Normal           FINAL IMPRESSION   1. Cellulitis of toe of left foot       Eric HARRIS (Ade), am scribing for, and in the presence of, Fernando Ferraro M.D..    Electronically signed by: Eric Santamaria (Ade), 5/8/2025    Fernando HARRIS M.D. personally performed the services described in this documentation, as scribed by Eric Santamaria in my presence, and it is both accurate and complete.    The note accurately reflects work and decisions made by me.  Fernando Ferraro M.D.  5/9/2025  3:15 AM

## 2025-05-09 NOTE — ED TRIAGE NOTES
Chief Complaint   Patient presents with    Digit Pain           Foot Swelling     BIB ems. Pt is complaining of left big toe pain. She lost the nail today and saw pus underneath. She states the pain started on Monday. Her left foot and swollen, red, and tender. She is unsure if she has had any fevers.     Vitals:    05/08/25 2013   BP: 130/63   Pulse: 82   Resp: 16   Temp: 36.6 °C (97.9 °F)   SpO2: 93%     Pt received 1G of tylenol and 600mg of Ibuprofen pta

## 2025-05-09 NOTE — CARDIAC REMOTE MONITOR - SCAN
Device transmission reviewed. Device demonstrated appropriate function.       Electronically Signed by: Donny Fishman MD, PhD    5/19/2025  12:38 PM

## 2025-05-13 LAB
BACTERIA BLD CULT: NORMAL
SIGNIFICANT IND 70042: NORMAL
SITE SITE: NORMAL
SOURCE SOURCE: NORMAL

## 2025-05-14 ENCOUNTER — APPOINTMENT (OUTPATIENT)
Dept: RADIOLOGY | Facility: MEDICAL CENTER | Age: 50
End: 2025-05-14
Attending: EMERGENCY MEDICINE
Payer: MEDICAID

## 2025-05-14 ENCOUNTER — HOSPITAL ENCOUNTER (EMERGENCY)
Facility: MEDICAL CENTER | Age: 50
End: 2025-05-14
Attending: EMERGENCY MEDICINE
Payer: MEDICAID

## 2025-05-14 VITALS
DIASTOLIC BLOOD PRESSURE: 56 MMHG | HEIGHT: 62 IN | RESPIRATION RATE: 15 BRPM | WEIGHT: 275 LBS | SYSTOLIC BLOOD PRESSURE: 109 MMHG | TEMPERATURE: 97 F | BODY MASS INDEX: 50.61 KG/M2 | HEART RATE: 75 BPM | OXYGEN SATURATION: 92 %

## 2025-05-14 DIAGNOSIS — R42 DIZZINESS: Primary | ICD-10-CM

## 2025-05-14 LAB
ALBUMIN SERPL BCP-MCNC: 4.4 G/DL (ref 3.2–4.9)
ALBUMIN/GLOB SERPL: 1.2 G/DL
ALP SERPL-CCNC: 145 U/L (ref 30–99)
ALT SERPL-CCNC: 16 U/L (ref 2–50)
ANION GAP SERPL CALC-SCNC: 11 MMOL/L (ref 7–16)
AST SERPL-CCNC: 21 U/L (ref 12–45)
BASOPHILS # BLD AUTO: 0.2 % (ref 0–1.8)
BASOPHILS # BLD: 0.02 K/UL (ref 0–0.12)
BILIRUB SERPL-MCNC: 0.8 MG/DL (ref 0.1–1.5)
BUN SERPL-MCNC: 21 MG/DL (ref 8–22)
CALCIUM ALBUM COR SERPL-MCNC: 9.5 MG/DL (ref 8.5–10.5)
CALCIUM SERPL-MCNC: 9.8 MG/DL (ref 8.5–10.5)
CHLORIDE SERPL-SCNC: 103 MMOL/L (ref 96–112)
CO2 SERPL-SCNC: 25 MMOL/L (ref 20–33)
CREAT SERPL-MCNC: 1.11 MG/DL (ref 0.5–1.4)
EKG IMPRESSION: NORMAL
EOSINOPHIL # BLD AUTO: 0.14 K/UL (ref 0–0.51)
EOSINOPHIL NFR BLD: 1.3 % (ref 0–6.9)
ERYTHROCYTE [DISTWIDTH] IN BLOOD BY AUTOMATED COUNT: 50.4 FL (ref 35.9–50)
GFR SERPLBLD CREATININE-BSD FMLA CKD-EPI: 60 ML/MIN/1.73 M 2
GLOBULIN SER CALC-MCNC: 3.6 G/DL (ref 1.9–3.5)
GLUCOSE SERPL-MCNC: 77 MG/DL (ref 65–99)
HCT VFR BLD AUTO: 41.9 % (ref 37–47)
HGB BLD-MCNC: 12.7 G/DL (ref 12–16)
IMM GRANULOCYTES # BLD AUTO: 0.05 K/UL (ref 0–0.11)
IMM GRANULOCYTES NFR BLD AUTO: 0.4 % (ref 0–0.9)
LYMPHOCYTES # BLD AUTO: 1.99 K/UL (ref 1–4.8)
LYMPHOCYTES NFR BLD: 17.8 % (ref 22–41)
MCH RBC QN AUTO: 26.6 PG (ref 27–33)
MCHC RBC AUTO-ENTMCNC: 30.3 G/DL (ref 32.2–35.5)
MCV RBC AUTO: 87.8 FL (ref 81.4–97.8)
MONOCYTES # BLD AUTO: 0.69 K/UL (ref 0–0.85)
MONOCYTES NFR BLD AUTO: 6.2 % (ref 0–13.4)
NEUTROPHILS # BLD AUTO: 8.28 K/UL (ref 1.82–7.42)
NEUTROPHILS NFR BLD: 74.1 % (ref 44–72)
NRBC # BLD AUTO: 0 K/UL
NRBC BLD-RTO: 0 /100 WBC (ref 0–0.2)
PLATELET # BLD AUTO: 273 K/UL (ref 164–446)
PMV BLD AUTO: 11.9 FL (ref 9–12.9)
POTASSIUM SERPL-SCNC: 5.1 MMOL/L (ref 3.6–5.5)
PROT SERPL-MCNC: 8 G/DL (ref 6–8.2)
RBC # BLD AUTO: 4.77 M/UL (ref 4.2–5.4)
SODIUM SERPL-SCNC: 139 MMOL/L (ref 135–145)
TROPONIN T SERPL-MCNC: 30 NG/L (ref 6–19)
TSH SERPL-ACNC: 0.95 UIU/ML (ref 0.38–5.33)
WBC # BLD AUTO: 11.2 K/UL (ref 4.8–10.8)

## 2025-05-14 PROCEDURE — 70496 CT ANGIOGRAPHY HEAD: CPT

## 2025-05-14 PROCEDURE — 700105 HCHG RX REV CODE 258: Mod: UD | Performed by: EMERGENCY MEDICINE

## 2025-05-14 PROCEDURE — 93005 ELECTROCARDIOGRAM TRACING: CPT | Mod: TC

## 2025-05-14 PROCEDURE — 84443 ASSAY THYROID STIM HORMONE: CPT

## 2025-05-14 PROCEDURE — 700117 HCHG RX CONTRAST REV CODE 255: Mod: UD | Performed by: EMERGENCY MEDICINE

## 2025-05-14 PROCEDURE — 85025 COMPLETE CBC W/AUTO DIFF WBC: CPT

## 2025-05-14 PROCEDURE — 36415 COLL VENOUS BLD VENIPUNCTURE: CPT

## 2025-05-14 PROCEDURE — 70498 CT ANGIOGRAPHY NECK: CPT

## 2025-05-14 PROCEDURE — 99284 EMERGENCY DEPT VISIT MOD MDM: CPT

## 2025-05-14 PROCEDURE — 700102 HCHG RX REV CODE 250 W/ 637 OVERRIDE(OP): Mod: UD | Performed by: EMERGENCY MEDICINE

## 2025-05-14 PROCEDURE — 84484 ASSAY OF TROPONIN QUANT: CPT

## 2025-05-14 PROCEDURE — 93005 ELECTROCARDIOGRAM TRACING: CPT | Mod: TC | Performed by: EMERGENCY MEDICINE

## 2025-05-14 PROCEDURE — 80053 COMPREHEN METABOLIC PANEL: CPT

## 2025-05-14 PROCEDURE — A9270 NON-COVERED ITEM OR SERVICE: HCPCS | Mod: UD | Performed by: EMERGENCY MEDICINE

## 2025-05-14 RX ORDER — SODIUM CHLORIDE 9 MG/ML
1000 INJECTION, SOLUTION INTRAVENOUS ONCE
Status: COMPLETED | OUTPATIENT
Start: 2025-05-14 | End: 2025-05-14

## 2025-05-14 RX ORDER — MECLIZINE HYDROCHLORIDE 25 MG/1
25 TABLET ORAL ONCE
Status: COMPLETED | OUTPATIENT
Start: 2025-05-14 | End: 2025-05-14

## 2025-05-14 RX ADMIN — IOHEXOL 90 ML: 350 INJECTION, SOLUTION INTRAVENOUS at 16:15

## 2025-05-14 RX ADMIN — SODIUM CHLORIDE 1000 ML: 9 INJECTION, SOLUTION INTRAVENOUS at 13:25

## 2025-05-14 RX ADMIN — MECLIZINE HYDROCHLORIDE 25 MG: 25 TABLET ORAL at 13:18

## 2025-05-14 ASSESSMENT — FIBROSIS 4 INDEX: FIB4 SCORE: 0.81

## 2025-05-14 ASSESSMENT — PAIN DESCRIPTION - PAIN TYPE: TYPE: ACUTE PAIN

## 2025-05-14 NOTE — ED NOTES
MTM called for pt. Reviewed discharge instructions with pt. Verbalized understanding of instructions. Will follow up as directed. Ambulatory out of ER with steady gait.

## 2025-05-14 NOTE — ED TRIAGE NOTES
Chief Complaint   Patient presents with    Dizziness     Pt reports she had sudden onset of dizziness and bilateral shoulder/neck pain.   Denies CP or SOB.      Pt BIB EMS from group home for above complaint. Pt received 1g tylenol and 50 ml NS prior to arrival.     EKG ordered.     Pt changed into gown and connected to monitor. Chart up for ERP.

## 2025-05-14 NOTE — ED PROVIDER NOTES
ED Provider Note    CHIEF COMPLAINT  Chief Complaint   Patient presents with    Dizziness     Pt reports she had sudden onset of dizziness and bilateral shoulder/neck pain.   Denies CP or SOB.        EXTERNAL RECORDS REVIEWED  Reviewed extensive ER visits baseline laboratory and imaging studies    HPI/ROS  LIMITATION TO HISTORY   Patient is a poor historian  OUTSIDE HISTORIAN(S):  None    Loerne Haro is a 49 y.o. female who presents for a constellation of symptoms including abrupt onset of dizziness with some pain in her shoulder and posterior neck.  The patient has a complex medical history as listed below.  She specifically denies facial droop difficulty word finding severe headache double vision or ataxia.  She reports dizziness but no tinnitus or nausea and vomiting.    PAST MEDICAL HISTORY   has a past medical history of A-fib (HCC), MONI (acute kidney injury) ([facility]) (2023), Asthma, Bipolar 2 disorder (HCC), Chickenpox, Chronic obstructive pulmonary disease (HCC), Congestive heart failure (HCC), Hypertension, On home oxygen therapy, Other psychological stress, Schizophrenic disorder (HCC), and Yeast infection of the skin (2021).    SURGICAL HISTORY   has a past surgical history that includes hysterectomy laparoscopy; colectomy; cholecystectomy; knee arthroscopy (Left); and orif, ankle (Left, 2024).    FAMILY HISTORY  Family History   Problem Relation Age of Onset    No Known Problems Mother     Cancer Sister        SOCIAL HISTORY  Social History     Tobacco Use    Smoking status: Some Days     Current packs/day: 0.00     Types: Cigarettes     Last attempt to quit: 10/12/2022     Years since quittin.5    Smokeless tobacco: Never   Vaping Use    Vaping status: Never Used   Substance and Sexual Activity    Alcohol use: Not Currently    Drug use: Never    Sexual activity: Not Currently       CURRENT MEDICATIONS  Home Medications       Reviewed by Renetta Oconnor R.N. (Registered Nurse) on  "05/14/25 at 1240  Med List Status: Not Addressed     Medication Last Dose Status   apixaban (ELIQUIS) 5mg Tab  Active   aripiprazole (ABILIFY) 30 MG tablet  Active   ascorbic acid (VITAMIN C) 500 MG tablet  Active   atorvastatin (LIPITOR) 20 MG Tab  Active   benzonatate (TESSALON) 100 MG Cap  Active   cyclobenzaprine (FLEXERIL) 10 mg Tab  Active   ferrous sulfate 325 (65 Fe) MG tablet  Active   fluticasone (FLONASE) 50 MCG/ACT nasal spray  Active   fluticasone-salmeterol (ADVAIR DISKUS) 100-50 MCG/ACT AEROSOL POWDER, BREATH ACTIVATED  Active   gabapentin (NEURONTIN) 300 MG Cap  Active   hydrOXYzine HCl (ATARAX) 25 MG Tab  Active   ketoconazole (NIZORAL) 2 % Cream  Active   lidocaine (LIDODERM) 5 % Patch  Active   metoprolol SR (TOPROL XL) 50 MG TABLET SR 24 HR  Active   montelukast (SINGULAIR) 10 MG Tab  Active   Vitamin D, Cholecalciferol, (CHOLECALCIFEROL) 25 MCG (1000 UT) Tab  Active                  Audit from Redirected Encounters    **Home medications have not yet been reviewed for this encounter**         ALLERGIES  Allergies[1]    PHYSICAL EXAM  VITAL SIGNS: /56   Pulse 65   Temp 37.2 °C (98.9 °F) (Temporal)   Resp 17   Ht 1.575 m (5' 2\")   Wt 125 kg (275 lb)   LMP  (LMP Unknown)   SpO2 91%   BMI 50.30 kg/m²    Pulse ox interpretation: I interpret this pulse ox as normal.  Constitutional: Alert and oriented x 3, no acute distress  HEENT: Atraumatic normocephalic, pupils are equal round reactive to light extraocular movements are intact no vertical horizontal or rotational nystagmus. The nares is clear, external ears are normal, mouth shows moist mucous membranes normal dentition for age  Neck: Supple, no JVD no tracheal deviation  Cardiovascular: Regular rate and rhythm no murmur rub or gallop 2+ pulses peripherally x4  Thorax & Lungs: No respiratory distress, no wheezes rales or rhonchi, No chest tenderness.   GI: Soft nontender nondistended positive bowel sounds, no peritoneal signs  Skin: " Warm dry no acute rash or lesion  Musculoskeletal: Moving all extremities with full range and 5 of 5 strength no acute  deformity  Neurologic: Cranial nerves III through XII are grossly intact no sensory deficit no cerebellar dysfunction no pronator drift of the arms or legs speech patterns normal patient was able to ambulate with her walker at her baseline.  No seizure activity.  NIH stroke scale score 0 GCS 15  Psychiatric: Appropriate affect for situation at this time          EKG/LABS  Results for orders placed or performed during the hospital encounter of 25   EKG    Collection Time: 25 12:39 PM   Result Value Ref Range    Report       [facility] Emergency Dept.    Test Date:  2025  Pt Name:    ADELINA MILLAN                Department: ER  MRN:        [id number]                      Room:       [facility]  Gender:     Female                       Technician: [id number]  :        [id number]                   Requested By:ER TRIAGE PROTOCOL  Order #:    [id number]                    Reading MD:    Measurements  Intervals                                Axis  Rate:       56                           P:          0  OK:         0                            QRS:        91  QRSD:       115                          T:          39  QT:         452  QTc:        437    Interpretive Statements  Atrial fibrillation  Incomplete right bundle branch block  Low voltage, precordial leads  ST elev, probable normal early repol pattern  Compared to ECG 2025 10:27:26  ST (T wave) deviation now present  Sinus bradycardia no longer present  Atrial premature complex(es) no longer present     CBC WITH DIFFERENTIAL    Collection Time: 25 12:45 PM   Result Value Ref Range    WBC 11.2 (H) 4.8 - 10.8 K/uL    RBC 4.77 4.20 - 5.40 M/uL    Hemoglobin 12.7 12.0 - 16.0 g/dL    Hematocrit 41.9 37.0 - 47.0 %    MCV 87.8 81.4 - 97.8 fL    MCH 26.6 (L) 27.0 - 33.0 pg    MCHC 30.3 (L) 32.2 - 35.5 g/dL    RDW 50.4  (H) 35.9 - 50.0 fL    Platelet Count 273 164 - 446 K/uL    MPV 11.9 9.0 - 12.9 fL    Neutrophils-Polys 74.10 (H) 44.00 - 72.00 %    Lymphocytes 17.80 (L) 22.00 - 41.00 %    Monocytes 6.20 0.00 - 13.40 %    Eosinophils 1.30 0.00 - 6.90 %    Basophils 0.20 0.00 - 1.80 %    Immature Granulocytes 0.40 0.00 - 0.90 %    Nucleated RBC 0.00 0.00 - 0.20 /100 WBC    Neutrophils (Absolute) 8.28 (H) 1.82 - 7.42 K/uL    Lymphs (Absolute) 1.99 1.00 - 4.80 K/uL    Monos (Absolute) 0.69 0.00 - 0.85 K/uL    Eos (Absolute) 0.14 0.00 - 0.51 K/uL    Baso (Absolute) 0.02 0.00 - 0.12 K/uL    Immature Granulocytes (abs) 0.05 0.00 - 0.11 K/uL    NRBC (Absolute) 0.00 K/uL   Comp Metabolic Panel    Collection Time: 05/14/25 12:45 PM   Result Value Ref Range    Sodium 139 135 - 145 mmol/L    Potassium 5.1 3.6 - 5.5 mmol/L    Chloride 103 96 - 112 mmol/L    Co2 25 20 - 33 mmol/L    Anion Gap 11.0 7.0 - 16.0    Glucose 77 65 - 99 mg/dL    Bun 21 8 - 22 mg/dL    Creatinine 1.11 0.50 - 1.40 mg/dL    Calcium 9.8 8.5 - 10.5 mg/dL    Correct Calcium 9.5 8.5 - 10.5 mg/dL    AST(SGOT) 21 12 - 45 U/L    ALT(SGPT) 16 2 - 50 U/L    Alkaline Phosphatase 145 (H) 30 - 99 U/L    Total Bilirubin 0.8 0.1 - 1.5 mg/dL    Albumin 4.4 3.2 - 4.9 g/dL    Total Protein 8.0 6.0 - 8.2 g/dL    Globulin 3.6 (H) 1.9 - 3.5 g/dL    A-G Ratio 1.2 g/dL   TROPONIN    Collection Time: 05/14/25 12:45 PM   Result Value Ref Range    Troponin T 30 (H) 6 - 19 ng/L   TSH    Collection Time: 05/14/25 12:45 PM   Result Value Ref Range    TSH 0.951 0.380 - 5.330 uIU/mL   ESTIMATED GFR    Collection Time: 05/14/25 12:45 PM   Result Value Ref Range    GFR (CKD-EPI) 60 >60 mL/min/1.73 m 2     *Note: Due to a large number of results and/or encounters for the requested time period, some results have not been displayed. A complete set of results can be found in Results Review.       I have independently interpreted this EKG    RADIOLOGY/PROCEDURES   I have independently interpreted the  diagnostic imaging associated with this visit and am waiting the final reading from the radiologist.   My preliminary interpretation is as follows: No evidence of acute vertebral or carotid artery dissection or hemorrhage or mass effect    Radiologist interpretation:  CT-CTA NECK WITH & W/O-POST PROCESSING   Final Result         CT angiogram of the neck within normal limits.      11 mm right thyroid nodule. Recommend follow-up with ultrasound      CT-CTA HEAD WITH & W/O-POST PROCESS   Final Result      CT angiogram of the [place] within normal limits.          COURSE & MEDICAL DECISION MAKING    ASSESSMENT, COURSE AND PLAN  Care Narrative:      This is a very challenging 49-year-old who lives in a group home who presents here rather frequently for several complaints.  Her complaint today was primarily dizziness.  She did not have any tinnitus or nausea and vomiting.  She did not have any ataxia or other neurological deficits such as cranial nerve abnormalities facial droop speech abnormality.  Her NIH stroke scale score was 0.  With her pain in her neck I was concerned she could have a vertebral or carotid artery dissection therefore CT angiogram of the head neck was performed.  Fortunately these are all within normal limits other than an incidental thyroid nodule.  Her CBC demonstrates mild leukocytosis without left shift.  She has no cough or dysuria.  Her troponin is slightly elevated at 30 but this is normal for the patient and she has a longstanding history of indeterminate troponins.  Nursing staff ambulated the patient and she was ambulatory with her walker without any ataxia at her baseline.  Have low suspicion for posterior circulation CVA.  After treatment with fluids and meclizine her symptoms dramatically improved will coordinate getting her transported back to her group home    ADDITIONAL PROBLEMS MANAGED      DISPOSITION AND DISCUSSIONS  I have discussed management of the patient with the  following physicians and JAY's: None    Discussion of management with other [facility] or appropriate source(s):  facilitated transport back to her group home    Escalation of care considered, and ultimately not performed: Considered admission for further neuroimaging    Barriers to care at this time, including but not limited to: None.     Decision tools and prescription drugs considered including, but not limited to: NIH stroke scale score and GCS was utilized.    FINAL DIAGNOSIS  1. Dizziness               Electronically signed by: Juan Day M.D., 5/14/2025 2:24 PM           [1]   Allergies  Allergen Reactions    Amoxicillin Rash and Itching     Tolerates cephalosporins, pt reports that she gets a rash all over her body and gets itchy    Penicillin G Rash and Itching     pt reports that she gets a rash all over her body and gets itchy

## 2025-05-14 NOTE — ED NOTES
Bedside report received from off going RN/tech: Renetta RN, assumed care of patient.  POC discussed with patient. Call light within reach, all needs addressed at this time.       Fall risk interventions in place: Move the patient closer to the nurse's station, Patient's personal possessions are with in their safe reach, Place socks on patient, and Keep floor surfaces clean and dry (all applicable per [place] Fall risk assessment)   Continuous monitoring: Pulse Ox or Blood Pressure  IVF/IV medications: Not Applicable   Oxygen: Room Air  Bedside sitter: Not Applicable   Isolation: Not Applicable

## 2025-05-20 ENCOUNTER — NON-PROVIDER VISIT (OUTPATIENT)
Dept: CARDIOLOGY | Facility: MEDICAL CENTER | Age: 50
End: 2025-05-20
Payer: MEDICAID

## 2025-05-20 PROCEDURE — 93298 REM INTERROG DEV EVAL SCRMS: CPT | Mod: 26 | Performed by: INTERNAL MEDICINE

## 2025-05-20 NOTE — CARDIAC REMOTE MONITOR - SCAN
Device transmission reviewed. Device demonstrated appropriate function.       Electronically Signed by: Rodney Alberto M.D.    5/20/2025  11:55 AM

## 2025-05-22 ENCOUNTER — HOSPITAL ENCOUNTER (EMERGENCY)
Facility: MEDICAL CENTER | Age: 50
End: 2025-05-23
Attending: EMERGENCY MEDICINE
Payer: MEDICAID

## 2025-05-22 ENCOUNTER — APPOINTMENT (OUTPATIENT)
Dept: RADIOLOGY | Facility: MEDICAL CENTER | Age: 50
End: 2025-05-22
Payer: MEDICAID

## 2025-05-22 ENCOUNTER — APPOINTMENT (OUTPATIENT)
Dept: RADIOLOGY | Facility: MEDICAL CENTER | Age: 50
End: 2025-05-22
Attending: EMERGENCY MEDICINE
Payer: MEDICAID

## 2025-05-22 DIAGNOSIS — J18.9 PNEUMONIA DUE TO INFECTIOUS ORGANISM, UNSPECIFIED LATERALITY, UNSPECIFIED PART OF LUNG: Primary | ICD-10-CM

## 2025-05-22 DIAGNOSIS — R42 LIGHTHEADEDNESS: ICD-10-CM

## 2025-05-22 DIAGNOSIS — R07.9 ACUTE CHEST PAIN: ICD-10-CM

## 2025-05-22 DIAGNOSIS — Z87.891 HISTORY OF SMOKING: ICD-10-CM

## 2025-05-22 LAB
ALBUMIN SERPL BCP-MCNC: 3.8 G/DL (ref 3.2–4.9)
ALBUMIN/GLOB SERPL: 1.1 G/DL
ALP SERPL-CCNC: 134 U/L (ref 30–99)
ALT SERPL-CCNC: 11 U/L (ref 2–50)
ANION GAP SERPL CALC-SCNC: 11 MMOL/L (ref 7–16)
AST SERPL-CCNC: 20 U/L (ref 12–45)
BASOPHILS # BLD AUTO: 0.3 % (ref 0–1.8)
BASOPHILS # BLD: 0.03 K/UL (ref 0–0.12)
BILIRUB SERPL-MCNC: 0.6 MG/DL (ref 0.1–1.5)
BUN SERPL-MCNC: 15 MG/DL (ref 8–22)
CALCIUM ALBUM COR SERPL-MCNC: 9.5 MG/DL (ref 8.5–10.5)
CALCIUM SERPL-MCNC: 9.3 MG/DL (ref 8.5–10.5)
CHLORIDE SERPL-SCNC: 106 MMOL/L (ref 96–112)
CO2 SERPL-SCNC: 23 MMOL/L (ref 20–33)
CREAT SERPL-MCNC: 0.95 MG/DL (ref 0.5–1.4)
EKG IMPRESSION: NORMAL
EOSINOPHIL # BLD AUTO: 0.15 K/UL (ref 0–0.51)
EOSINOPHIL NFR BLD: 1.3 % (ref 0–6.9)
ERYTHROCYTE [DISTWIDTH] IN BLOOD BY AUTOMATED COUNT: 50.7 FL (ref 35.9–50)
GFR SERPLBLD CREATININE-BSD FMLA CKD-EPI: 72 ML/MIN/1.73 M 2
GLOBULIN SER CALC-MCNC: 3.4 G/DL (ref 1.9–3.5)
GLUCOSE SERPL-MCNC: 100 MG/DL (ref 65–99)
HCT VFR BLD AUTO: 38.8 % (ref 37–47)
HGB BLD-MCNC: 12 G/DL (ref 12–16)
IMM GRANULOCYTES # BLD AUTO: 0.05 K/UL (ref 0–0.11)
IMM GRANULOCYTES NFR BLD AUTO: 0.4 % (ref 0–0.9)
LYMPHOCYTES # BLD AUTO: 2.02 K/UL (ref 1–4.8)
LYMPHOCYTES NFR BLD: 17.8 % (ref 22–41)
MCH RBC QN AUTO: 27 PG (ref 27–33)
MCHC RBC AUTO-ENTMCNC: 30.9 G/DL (ref 32.2–35.5)
MCV RBC AUTO: 87.2 FL (ref 81.4–97.8)
MONOCYTES # BLD AUTO: 0.69 K/UL (ref 0–0.85)
MONOCYTES NFR BLD AUTO: 6.1 % (ref 0–13.4)
NEUTROPHILS # BLD AUTO: 8.4 K/UL (ref 1.82–7.42)
NEUTROPHILS NFR BLD: 74.1 % (ref 44–72)
NRBC # BLD AUTO: 0 K/UL
NRBC BLD-RTO: 0 /100 WBC (ref 0–0.2)
NT-PROBNP SERPL IA-MCNC: 247 PG/ML (ref 0–125)
PLATELET # BLD AUTO: 228 K/UL (ref 164–446)
PMV BLD AUTO: 11.7 FL (ref 9–12.9)
POTASSIUM SERPL-SCNC: 4.2 MMOL/L (ref 3.6–5.5)
PROT SERPL-MCNC: 7.2 G/DL (ref 6–8.2)
RBC # BLD AUTO: 4.45 M/UL (ref 4.2–5.4)
SODIUM SERPL-SCNC: 140 MMOL/L (ref 135–145)
TROPONIN T SERPL-MCNC: 29 NG/L (ref 6–19)
TROPONIN T SERPL-MCNC: 33 NG/L (ref 6–19)
WBC # BLD AUTO: 11.3 K/UL (ref 4.8–10.8)

## 2025-05-22 PROCEDURE — 36415 COLL VENOUS BLD VENIPUNCTURE: CPT

## 2025-05-22 PROCEDURE — 83880 ASSAY OF NATRIURETIC PEPTIDE: CPT

## 2025-05-22 PROCEDURE — 99285 EMERGENCY DEPT VISIT HI MDM: CPT

## 2025-05-22 PROCEDURE — 93005 ELECTROCARDIOGRAM TRACING: CPT | Mod: TC

## 2025-05-22 PROCEDURE — 85025 COMPLETE CBC W/AUTO DIFF WBC: CPT

## 2025-05-22 PROCEDURE — 71045 X-RAY EXAM CHEST 1 VIEW: CPT

## 2025-05-22 PROCEDURE — 80053 COMPREHEN METABOLIC PANEL: CPT

## 2025-05-22 PROCEDURE — 84484 ASSAY OF TROPONIN QUANT: CPT | Mod: 91

## 2025-05-22 PROCEDURE — 93005 ELECTROCARDIOGRAM TRACING: CPT | Mod: TC | Performed by: EMERGENCY MEDICINE

## 2025-05-22 ASSESSMENT — FIBROSIS 4 INDEX: FIB4 SCORE: .980769230769230769

## 2025-05-23 ENCOUNTER — PHARMACY VISIT (OUTPATIENT)
Dept: PHARMACY | Facility: MEDICAL CENTER | Age: 50
End: 2025-05-23
Payer: COMMERCIAL

## 2025-05-23 VITALS
HEART RATE: 61 BPM | BODY MASS INDEX: 50.71 KG/M2 | HEIGHT: 62 IN | SYSTOLIC BLOOD PRESSURE: 122 MMHG | WEIGHT: 275.57 LBS | OXYGEN SATURATION: 94 % | TEMPERATURE: 97.6 F | RESPIRATION RATE: 20 BRPM | DIASTOLIC BLOOD PRESSURE: 60 MMHG

## 2025-05-23 LAB — EKG IMPRESSION: NORMAL

## 2025-05-23 PROCEDURE — RXMED WILLOW AMBULATORY MEDICATION CHARGE: Performed by: EMERGENCY MEDICINE

## 2025-05-23 PROCEDURE — A9270 NON-COVERED ITEM OR SERVICE: HCPCS | Mod: UD | Performed by: EMERGENCY MEDICINE

## 2025-05-23 PROCEDURE — 700111 HCHG RX REV CODE 636 W/ 250 OVERRIDE (IP): Mod: UD | Performed by: EMERGENCY MEDICINE

## 2025-05-23 PROCEDURE — 700102 HCHG RX REV CODE 250 W/ 637 OVERRIDE(OP): Mod: UD | Performed by: EMERGENCY MEDICINE

## 2025-05-23 PROCEDURE — 700101 HCHG RX REV CODE 250: Mod: UD | Performed by: EMERGENCY MEDICINE

## 2025-05-23 PROCEDURE — 94640 AIRWAY INHALATION TREATMENT: CPT

## 2025-05-23 RX ORDER — LEVOFLOXACIN 750 MG/1
750 TABLET, FILM COATED ORAL ONCE
Status: COMPLETED | OUTPATIENT
Start: 2025-05-23 | End: 2025-05-23

## 2025-05-23 RX ORDER — PREDNISONE 20 MG/1
40 TABLET ORAL ONCE
Status: COMPLETED | OUTPATIENT
Start: 2025-05-23 | End: 2025-05-23

## 2025-05-23 RX ORDER — ALBUTEROL SULFATE 90 UG/1
2 INHALANT RESPIRATORY (INHALATION) EVERY 6 HOURS PRN
Qty: 18 G | Refills: 0 | Status: SHIPPED | OUTPATIENT
Start: 2025-05-23

## 2025-05-23 RX ORDER — IPRATROPIUM BROMIDE AND ALBUTEROL SULFATE 2.5; .5 MG/3ML; MG/3ML
3 SOLUTION RESPIRATORY (INHALATION) ONCE
Status: COMPLETED | OUTPATIENT
Start: 2025-05-23 | End: 2025-05-23

## 2025-05-23 RX ORDER — LEVOFLOXACIN 750 MG/1
750 TABLET, FILM COATED ORAL DAILY
Qty: 4 TABLET | Refills: 0 | Status: ACTIVE | OUTPATIENT
Start: 2025-05-23 | End: 2025-05-27

## 2025-05-23 RX ORDER — METHYLPREDNISOLONE SODIUM SUCCINATE 125 MG/2ML
125 INJECTION, POWDER, LYOPHILIZED, FOR SOLUTION INTRAMUSCULAR; INTRAVENOUS ONCE
Status: DISCONTINUED | OUTPATIENT
Start: 2025-05-23 | End: 2025-05-23

## 2025-05-23 RX ORDER — PREDNISONE 20 MG/1
40 TABLET ORAL DAILY
Qty: 8 TABLET | Refills: 0 | Status: SHIPPED | OUTPATIENT
Start: 2025-05-23 | End: 2025-05-27

## 2025-05-23 RX ADMIN — LEVOFLOXACIN 750 MG: 750 TABLET, FILM COATED ORAL at 00:51

## 2025-05-23 RX ADMIN — PREDNISONE 40 MG: 20 TABLET ORAL at 00:50

## 2025-05-23 RX ADMIN — IPRATROPIUM BROMIDE AND ALBUTEROL SULFATE 3 ML: .5; 2.5 SOLUTION RESPIRATORY (INHALATION) at 00:37

## 2025-05-23 ASSESSMENT — HEART SCORE
TROPONIN: LESS THAN OR EQUAL TO NORMAL LIMIT
RISK FACTORS: 1-2 RISK FACTORS
HISTORY: SLIGHTLY SUSPICIOUS
HEART SCORE: 3
AGE: 45-64
ECG: NON-SPECIFIC REPOLARIZATION DISTURBANCE

## 2025-05-23 NOTE — ED PROVIDER NOTES
ER Provider Note    Scribed for  David Quevedo D.O. by Arabella Pavon. 5/22/2025   9:10 PM    Primary Care Provider: Lili Reddy M.D.    CHIEF COMPLAINT  Chief Complaint   Patient presents with    Chest Pain     Started at 1300 w/ feeling of dizziness.      EXTERNAL RECORDS REVIEWED  Outpatient Notes Patient has a history of obstructive sleep apnea and had a follow up visit on 04/18/2025 with pulmonology.     HPI/ROS  LIMITATION TO HISTORY   Select: : None  OUTSIDE HISTORIAN(S):  EMS contributing to history as detailed below.     Lorene Haro is a 49 y.o. female with history of atrial fibrillation on Eliquis who presents to the ED for chest pain that started this afternoon at 1:00 PM.  Along with cough and occasional shortness of breath.  Patient admits to feeling dizzy as well which she describes as feeling like she is going to pass out. States that she is currently living in a group home. Patient denies any other symptoms or injuries at this time.     PAST MEDICAL HISTORY  Past Medical History:   Diagnosis Date    A-fib (HCC)     MONI (acute kidney injury) (HCC) 08/09/2023    Asthma     Bipolar 2 disorder (HCC)     Chickenpox     Chronic obstructive pulmonary disease (HCC)     Congestive heart failure (HCC)     Hypertension     On home oxygen therapy     Other psychological stress     Schizophrenic disorder (HCC)     Yeast infection of the skin 04/01/2021     SURGICAL HISTORY  Past Surgical History:   Procedure Laterality Date    ORIF, ANKLE Left 11/2/2024    Procedure: ORIF, ANKLE- TALUS;  Surgeon: Roman Rosa M.D.;  Location: SURGERY [facility];  Service: Orthopedics    CHOLECYSTECTOMY      COLECTOMY      HYSTERECTOMY LAPAROSCOPY      KNEE ARTHROSCOPY Left      FAMILY HISTORY  Family History   Problem Relation Age of Onset    No Known Problems Mother     Cancer Sister      SOCIAL HISTORY   reports that she has been smoking cigarettes. She has never used smokeless tobacco. She reports that she  does not currently use alcohol. She reports that she does not use drugs.    CURRENT MEDICATIONS  Discharge Medication List as of 5/23/2025 12:53 AM        CONTINUE these medications which have NOT CHANGED    Details   fluticasone-salmeterol (ADVAIR DISKUS) 100-50 MCG/ACT AEROSOL POWDER, BREATH ACTIVATED Inhale 1 Puff 2 times a day., Disp-60 Each, R-3, Normal      benzonatate (TESSALON) 100 MG Cap Take 1 Capsule by mouth 3 times a day as needed for Cough., Disp-60 Capsule, R-0, Normal      metoprolol SR (TOPROL XL) 50 MG TABLET SR 24 HR Take 1 Tablet by mouth every day., Disp-90 Tablet, R-3, Normal      apixaban (ELIQUIS) 5mg Tab Take 1 Tablet by mouth 2 times a day.This rx was submitted by a pharmacist working under a collaborative practice agreementDisp-60 Tablet, R-5, Normal      cyclobenzaprine (FLEXERIL) 10 mg Tab Take 1 Tablet by mouth 3 times a day as needed for Moderate Pain or Muscle Spasms., Disp-15 Tablet, R-1, Normal      gabapentin (NEURONTIN) 300 MG Cap Take 1 Capsule by mouth 3 times a day., No Print      hydrOXYzine HCl (ATARAX) 25 MG Tab Take 1 Tablet by mouth 3 times a day as needed for Itching., No Print      Vitamin D, Cholecalciferol, (CHOLECALCIFEROL) 25 MCG (1000 UT) Tab Take 2 Tablets by mouth every day., Disp-60 Tablet, R-0, Normal      ascorbic acid (VITAMIN C) 500 MG tablet Take 500 mg by mouth every day., Historical Med      ferrous sulfate 325 (65 Fe) MG tablet Take 325 mg by mouth every 48 hours., Historical Med      ketoconazole (NIZORAL) 2 % Cream Apply 1 Application topically 2 times a day., Historical Med      lidocaine (LIDODERM) 5 % Patch Place 1 Patch on the skin every 12 hours., Historical Med      montelukast (SINGULAIR) 10 MG Tab Take 1 Tablet by mouth every evening.3 month supply x 1 yearDisp-90 Tablet, R-3, Normal      fluticasone (FLONASE) 50 MCG/ACT nasal spray Administer 2 Sprays into each nostril every morning., Historical Med      aripiprazole (ABILIFY) 30 MG tablet Take  "1 Tablet by mouth every morning. Indications: Major Depressive Disorder, Disp-30 Tablet, R-0, Print Rx Paper      atorvastatin (LIPITOR) 20 MG Tab Take 1 Tablet by mouth at bedtime., Disp-30 Tablet, R-0, Print Rx Paper           ALLERGIES  Allergies   Allergen Reactions    Amoxicillin Rash and Itching     Tolerates cephalosporins, pt reports that she gets a rash all over her body and gets itchy    Penicillin G Rash and Itching     pt reports that she gets a rash all over her body and gets itchy      PHYSICAL EXAM  /66   Pulse 68   Temp 36.8 °C (98.2 °F) (Temporal)   Resp 16   Ht 1.575 m (5' 2\")   Wt 125 kg (275 lb 9.2 oz)   LMP  (LMP Unknown)   SpO2 98%   BMI 50.40 kg/m²      General: No acute distress, obese   Neuro: Neuro: GCS 15, A and O x 4, CN 2-12 GI, MS 5/5 x 4 ex, Sensation Coordination Gait wnl.  Neck: Supple  Cardiac: Regular rate and rhythm  Pulmonary: Minimal wheeze, no distress speaks in full sentences.  Abdomen: Soft nontender nondistended  Back: Nontender  Psych: Normal  Skin: Pink warm dry  Extremities: Full range of motion, muscle strength sensation intact 2+ pulses, no lower extremity edema    DIAGNOSTIC STUDIES/PROCEDURES  Labs:   Labs Reviewed   CBC WITH DIFFERENTIAL - Abnormal; Notable for the following components:       Result Value    WBC 11.3 (*)     MCHC 30.9 (*)     RDW 50.7 (*)     Neutrophils-Polys 74.10 (*)     Lymphocytes 17.80 (*)     Neutrophils (Absolute) 8.40 (*)     All other components within normal limits   COMP METABOLIC PANEL - Abnormal; Notable for the following components:    Glucose 100 (*)     Alkaline Phosphatase 134 (*)     All other components within normal limits   PROBRAIN NATRIURETIC PEPTIDE, NT - Abnormal; Notable for the following components:    NT-proBNP 247 (*)     All other components within normal limits   TROPONIN - Abnormal; Notable for the following components:    Troponin T 33 (*)     All other components within normal limits   TROPONIN - " Abnormal; Notable for the following components:    Troponin T 29 (*)     All other components within normal limits   ESTIMATED GFR     I have independently interpreted the above labs    EKG:   Results for orders placed or performed during the hospital encounter of 25   EKG   Result Value Ref Range    Report       [facility] Emergency Dept.    Test Date:  2025  Pt Name:    ADELINA MILLAN                Department: ER  MRN:        [id number]                      Room:  Gender:     Female                       Technician: [id number]  :        1975                   Requested By:ER TRIAGE PROTOCOL  Order #:    [id number]                    Reading MD: David Quevedo    Measurements  Intervals                                Axis  Rate:       65                           P:          75  IL:         124                          QRS:        20  QRSD:       121                          T:          1  QT:         388  QTc:        404    Interpretive Statements  Sinus rhythm  Supraventricular bigeminy  Right bundle branch block  Compared to ECG 2025 12:39:41  Atrial premature complex(es) now present  Right bundle-branch block now present  Atrial fibrillation no longer present  Incomplete right bundle-branch block no longer present  ST (T wave) deviation no longer present  Belle ctronically Signed On 2025 21:03:28 PDT by David Quevedo     EKG   Result Value Ref Range    Report       [facility] Emergency Dept.    Test Date:  2025  Pt Name:    ADELINA MILLAN                Department: ER  MRN:        [id number]                      Room:  Gender:     Female                       Technician: [id number]  :        1975                   Requested By:ER TRIAGE PROTOCOL  Order #:    [id number]                    Reading MD: David Quevedo    Measurements  Intervals                                Axis  Rate:       78                           P:          50  IL:          130                          QRS:        64  QRSD:       120                          T:          0  QT:         393  QTc:        448    Interpretive Statements  Sinus rhythm  IVCD, consider atypical RBBB  Baseline wander in lead(s) I,III,aVL  Compared to ECG 05/22/2025 19:08:24  No significant changes  Electronically Signed On 05- 00:15:19 PDT by David Quevedo       *Note: Due to a large number of results and/or encounters for the requested time period, some results have not been displayed. A complete set of results can be found in Results Review.     I have independently interpreted this EKG    Radiology:   This attending emergency physician has independently interpreted the diagnostic imaging associated with this visit and is awaiting the final reading from the radiologist.   Preliminary interpretation is a follows: Concern for infiltrate  Radiologist interpretation:   DX-CHEST-PORTABLE (1 VIEW)   Final Result         1.  Hazy left lower lobe infiltrates   2.  Cardiomegaly         COURSE & MEDICAL DECISION MAKING     CHEST PAIN:   HEART Score for Major Cardiac Events  HEART Score     History: Slightly suspicious  ECG: Non-specific repolarization disturbance  Age: 45-64  Risk Factors: 1-2 risk factors  Troponin: Less than or equal to normal limit    Heart Score: 3    Total Score   0-3 Points = Low Score, risk of MACE 0.9-1.7%.  4-6 Points = Moderate Score, risk of MACE 12-16.6%  7-10 Points = High Score, risk of MACE 50-65%      INITIAL ASSESSMENT, COURSE AND PLAN  Differential diagnoses include but not limited to: Arrhythmia, atrial fibrillation, acute coronary syndrome       Care Narrative: 49-year-old previous smoker is complaining of chest pain shortness of breath with some wheeze on exam.    9:10 PM - Patient was first seen and evaluated at bedside. Patient presents to the ED for chest pain. Ordered for EKG, troponin, pBNP, CMP, CBC with diff, and DX-Chest-Portable (1 view) to evaluate. Patient  verbalizes understanding and support with my plan of care.     9:12 PM - I have ordered for an EKG to evaluate.     12:14 AM - I have ordered for patient to be medicated with Solu-Medrol 125 mg IV, Levaquin 750 mg PO, and DuoNeb 3 mL.     12:16 AM - Patient was reevaluated at bedside. Discussed results with patient and plan for discharge following intervention. Patient was given the opportunity for questions which were answered appropriately.  Patient is to return to the ED for new or worsening symptoms or any additional concerns. Patient has verbalized understanding and agreement to this plan. Patient is stable for discharge at this time.      ED COURSE AND ADDITIONAL PROBLEMS    1. Pneumonia due to infectious organism, unspecified laterality, unspecified part of lung: Antibiotics.  Antibiotics for discharge as well as steroids and albuterol       2. Acute chest pain: Low heart score.  EKG with occasional bigeminy.  No ST changes.  Patient does not have chest pain here she had chest pain prior to coming here.  Troponin decreased.  We had prolonged discussion with the patient she does understand there are some risk of major adverse cardiac red need for intervention heart attack prolonged stability or death with discharge.  And she prefers outpatient management at this time.       3. Lightheadedness: Hemodynamics here are quite benign.  Neurologically intact.       4. History of smoking:  Patient recently quit smoking and we encouraged her to stay on that path.          DISPOSITION AND DISCUSSIONS    I have discussed management of the patient with the following physicians and JAY's:  None.     Discussion of management with other Q or appropriate source(s): None     Escalation of care considered, and ultimately not performed: acute inpatient care management, however at this time, the patient is most appropriate for outpatient management.    Barriers to care at this time, including but not limited to: None.      Decision tools and prescription drugs considered including, but not limited to: Heart score.    DISPOSITION:  Patient will be discharged home in stable condition.    FOLLOW UP:  Lili Reddy M.D.  [address]  [address]  [facility] [id number] [phone]    Schedule an appointment as soon as possible for a visit in 1 day      [facility], Emergency Dept  [address] [address] [phone]    As needed, If symptoms worsen      OUTPATIENT MEDICATIONS:  Discharge Medication List as of 5/23/2025 12:53 AM        START taking these medications    Details   levoFLOXacin (LEVAQUIN) 750 MG tablet Take 1 Tablet by mouth every day for 4 days., Disp-4 Tablet, R-0, Normal      predniSONE (DELTASONE) 20 MG Tab Take 2 Tablets by mouth every day for 4 days. Disp-8 Tablet, R-0, Normal      albuterol 108 (90 Base) MCG/ACT Aero Soln inhalation aerosol Inhale 2 Puffs every 6 hours as needed for Shortness of Breath., Disp-18 g, R-0, Normal           FINAL DIAGNOSIS  1. Pneumonia due to infectious organism, unspecified laterality, unspecified part of lung Acute   2. Acute chest pain Acute   3. Lightheadedness Acute   4. History of smoking Acute      Arabella HARRIS (Scribe), am scribing for, and in the presence of, David Quevedo D.O..    Electronically signed by: Arabella Pavon (Scribe), 5/22/2025    David HARRIS D.O. personally performed the services described in this documentation, as scribed by Arabella Pavon in my presence, and it is both accurate and complete.      The note accurately reflects work and decisions made by me.  David Quevedo D.O.  5/23/2025  2:44 AM

## 2025-05-23 NOTE — ED TRIAGE NOTES
"Chief Complaint   Patient presents with    Chest Pain     Started at 1300 w/ feeling of dizziness.      /66   Pulse 68   Temp 36.8 °C (98.2 °F) (Temporal)   Resp 16   Ht 1.575 m (5' 2\")   Wt 125 kg (275 lb 9.2 oz)   LMP  (LMP Unknown)   SpO2 98%   BMI 50.40 kg/m²     Pt assisted to triage by wheelchair. Received 324 ASA by EMS. Chest pain protocol ordered. Pt was seen for dizziness on 5/14    Placed pt back for lab draw, educated on NPO status.     "

## 2025-05-23 NOTE — DISCHARGE INSTRUCTIONS
Take your medication as prescribed until completion.  Call your doctor to schedule an appointment in the next few days to discuss your recent emergency department visit.  Return to the emergency department if anything worsens.

## 2025-05-23 NOTE — ED NOTES
Patient sitting up in chair. All needs met at this time. Instructed pt to call for assistance if needed. Call light and belongings within reach.

## 2025-05-23 NOTE — ED NOTES
"Pt discharged home via MTM. Patient in possession of belongings. Pt provided discharge education and information pertaining to medications and follow up appointments. Pt received copy of discharge instructions and verbalized understanding.Discussed signs and symptoms to return to the ER, patient verbalized understanding. Pt is A/O x 4, WC to the lobby with ED tech to uber. Pt provided Meds to beds     /60   Pulse 61   Temp 36.4 °C (97.6 °F) (Temporal)   Resp 20   Ht 1.575 m (5' 2\")   Wt 125 kg (275 lb 9.2 oz)   LMP  (LMP Unknown)   SpO2 94%   BMI 50.40 kg/m²    "

## 2025-05-23 NOTE — ED NOTES
Bedside report received from off going RN/tech: Lidia RN, assumed care of patient.  POC discussed with patient. Call light within reach, all needs addressed at this time.   Fall risk interventions in place: Move the patient closer to the nurse's station, Patient's personal possessions are with in their safe reach, Keep floor surfaces clean and dry, and Accompanied to restroom (all applicable per [place] Fall risk assessment)   Continuous monitoring: Cardiac Leads, Pulse Ox, or Blood Pressure  IVF/IV medications: Not Applicable   Oxygen: Room Air  Bedside sitter: Not Applicable   Isolation: Not Applicable

## 2025-05-29 ENCOUNTER — APPOINTMENT (OUTPATIENT)
Dept: RADIOLOGY | Facility: MEDICAL CENTER | Age: 50
End: 2025-05-29
Attending: EMERGENCY MEDICINE
Payer: MEDICAID

## 2025-05-29 ENCOUNTER — HOSPITAL ENCOUNTER (EMERGENCY)
Facility: MEDICAL CENTER | Age: 50
End: 2025-05-30
Attending: EMERGENCY MEDICINE
Payer: MEDICAID

## 2025-05-29 DIAGNOSIS — M79.675 GREAT TOE PAIN, LEFT: Primary | ICD-10-CM

## 2025-05-29 PROCEDURE — 73660 X-RAY EXAM OF TOE(S): CPT | Mod: LT

## 2025-05-29 PROCEDURE — 700102 HCHG RX REV CODE 250 W/ 637 OVERRIDE(OP): Mod: UD | Performed by: EMERGENCY MEDICINE

## 2025-05-29 PROCEDURE — 99284 EMERGENCY DEPT VISIT MOD MDM: CPT

## 2025-05-29 PROCEDURE — A9270 NON-COVERED ITEM OR SERVICE: HCPCS | Mod: UD | Performed by: EMERGENCY MEDICINE

## 2025-05-29 RX ORDER — IBUPROFEN 600 MG/1
600 TABLET, FILM COATED ORAL ONCE
Status: COMPLETED | OUTPATIENT
Start: 2025-05-29 | End: 2025-05-29

## 2025-05-29 RX ADMIN — IBUPROFEN 600 MG: 600 TABLET ORAL at 22:43

## 2025-05-29 ASSESSMENT — FIBROSIS 4 INDEX
FIB4 SCORE: 1.35

## 2025-05-29 ASSESSMENT — PAIN DESCRIPTION - PAIN TYPE: TYPE: ACUTE PAIN

## 2025-05-30 VITALS
RESPIRATION RATE: 16 BRPM | HEIGHT: 62 IN | HEART RATE: 61 BPM | BODY MASS INDEX: 50.61 KG/M2 | OXYGEN SATURATION: 92 % | DIASTOLIC BLOOD PRESSURE: 70 MMHG | WEIGHT: 275 LBS | TEMPERATURE: 98.1 F | SYSTOLIC BLOOD PRESSURE: 144 MMHG

## 2025-05-30 NOTE — DISCHARGE PLANNING
KETTY was able to make contact with the group home staff Shirley at [phone], and she advises SW she is not able to  the patient but we can send the patient in a taxi back to the group [place] at: [address] [facility], [address] [address]. KETTY confirmed the patient is able to take a taxi and Shirley stated the patient takes taxis to get around all the time. KETTY provided a taxi voucher for the pt to get home.

## 2025-05-30 NOTE — ED PROVIDER NOTES
ED Provider Note    ED PHYSICIAN NOTE    CHIEF COMPLAINT  Chief Complaint   Patient presents with    Toe Pain     Patient with left great toe pain, states she lost her toenail approx 1 month ago and pain increased today       EXTERNAL RECORDS REVIEWED  Outpatient Notes sleep center visits in March and April 2025, multiple other emergency department visits as well    HPI/ROS  LIMITATION TO HISTORY   Select: : None  OUTSIDE HISTORIAN(S):  none    Lorene Haro is a 49 y.o. female who presents with pain to the left great toe.  Patient reports that she lost the toenail around a month ago and it has been hurting more in the last day.  She did bump the toe but reports no other trauma.  She reports no fevers or chills.  No redness or swelling no drainage.  No focal weakness numbness or tingling    PAST MEDICAL HISTORY  Past Medical History[1]    SOCIAL HISTORY  Social History[2]    CURRENT MEDICATIONS  Home Medications       Reviewed by Jammie Marie R.N. (Registered Nurse) on 05/29/25 at 2134  Med List Status: Partial     Medication Last Dose Status   albuterol 108 (90 Base) MCG/ACT Aero Soln inhalation aerosol  Active   apixaban (ELIQUIS) 5mg Tab  Active   aripiprazole (ABILIFY) 30 MG tablet  Active   ascorbic acid (VITAMIN C) 500 MG tablet  Active   atorvastatin (LIPITOR) 20 MG Tab  Active   benzonatate (TESSALON) 100 MG Cap  Active   cyclobenzaprine (FLEXERIL) 10 mg Tab  Active   ferrous sulfate 325 (65 Fe) MG tablet  Active   fluticasone (FLONASE) 50 MCG/ACT nasal spray  Active   fluticasone-salmeterol (ADVAIR DISKUS) 100-50 MCG/ACT AEROSOL POWDER, BREATH ACTIVATED  Active   gabapentin (NEURONTIN) 300 MG Cap  Active   hydrOXYzine HCl (ATARAX) 25 MG Tab  Active   ketoconazole (NIZORAL) 2 % Cream  Active   lidocaine (LIDODERM) 5 % Patch  Active   metoprolol SR (TOPROL XL) 50 MG TABLET SR 24 HR  Active   montelukast (SINGULAIR) 10 MG Tab  Active   Vitamin D, Cholecalciferol, (CHOLECALCIFEROL) 25 MCG (1000 UT)  "Tab  Active                  Audit from Redirected Encounters    **Home medications have not yet been reviewed for this encounter**         ALLERGIES  Allergies[3]    PHYSICAL EXAM  VITAL SIGNS: /82   Pulse 74   Temp 36.9 °C (98.5 °F) (Temporal)   Resp 20   Ht 1.575 m (5' 2\")   Wt 125 kg (275 lb)   LMP  (LMP Unknown)   SpO2 90%   BMI 50.30 kg/m²    Constitutional: Awake and alert   HENT: Normal inspection, no signs of trauma  Eyes: Normal inspection, Pupils equal, non-icteric  Neck: Grossly normal range of motion. No stridor  Cardiovascular: Regular rate and rhythm, no murmurs.     Thorax & Lungs: No respiratory distress  Skin: No obvious rash. Warm. Dry.   Extremities: Over the left great toe the toenail is missing and there is some tenderness there is no erythema or abnormal coloration.  No fluctuance or induration, distal capillary refill is less than 2 seconds no cyanosis, no edema  Neurologic: AO3, Grossly normal  Psychiatric: Normal affect for situation        DIAGNOSTIC STUDIES / PROCEDURES  RADIOLOGY  I have independently interpreted the diagnostic imaging associated with this visit and am waiting the final reading from the radiologist.   My preliminary interpretation is as follows: no fracture    DX-TOE(S) 2+ LEFT   Final Result         1.  No acute traumatic bony injury identified            COURSE & MEDICAL DECISION MAKING    INITIAL ASSESSMENT, COURSE AND PLAN  Care Narrative: 10:15 PM  Patient presents with toe pain.  Consideration for underlying fracture, contusion to the side of her lost toenail.  Her exam does not show any findings of infection, no findings of vascular compromise.  Of order for x-ray, ibuprofen    Patient is reevaluated, updated on results, she is comfortable with the discharge plan    Interventions  Medications   ibuprofen (Motrin) tablet 600 mg (600 mg Oral Given 5/29/25 9809)              PROBLEM LIST    # Toe pain secondary to toenail loss.  No findings of acute " process, discussed home care measures        DISPOSITION AND DISCUSSIONS    Barriers to care at this time, including but not limited to: Patient lacks transportation .     Prescription drugs considered and/or prescribed:   Considered opiate prescription, but nonnarcotic analgesic is most appropriate  Prescribed   New Prescriptions    No medications on file       The patient will return for new or worsening symptoms and is stable at the time of discharge.    The patient is referred to a primary physician for blood pressure management, diabetic screening, and for all other preventative health concerns.      DISPOSITION:  Patient will be discharged home in stable condition.    FOLLOW UP:  No follow-up provider specified.    OUTPATIENT MEDICATIONS:  New Prescriptions    No medications on file         FINAL DIAGNOSIS  1. Great toe pain, left               This dictation was created using voice recognition software. The accuracy of the dictation is limited to the abilities of the software. I expect there may be some errors of grammar and possibly content. The nursing notes were reviewed and certain aspects of this information were incorporated into this note.    Electronically signed by: Yanick Posada M.D., 2025          [1]   Past Medical History:  Diagnosis Date    A-fib ([facility])     MONI (acute kidney injury) ([facility]) 2023    Asthma     Bipolar 2 disorder (HCC)     Chickenpox     Chronic obstructive pulmonary disease (HCC)     Congestive heart failure (HCC)     Hypertension     On home oxygen therapy     Other psychological stress     Schizophrenic disorder (HCC)     Yeast infection of the skin 2021   [2]   Social History  Tobacco Use    Smoking status: Some Days     Current packs/day: 0.00     Types: Cigarettes     Last attempt to quit: 10/12/2022     Years since quittin.6    Smokeless tobacco: Never   Vaping Use    Vaping status: Never Used   Substance Use Topics    Alcohol use: Not Currently    Drug  use: Never   [3]   Allergies  Allergen Reactions    Amoxicillin Rash and Itching     Tolerates cephalosporins, pt reports that she gets a rash all over her body and gets itchy    Penicillin G Rash and Itching     pt reports that she gets a rash all over her body and gets itchy

## 2025-05-30 NOTE — ED NOTES
Discharge instructions given, no further questions. Pt given taxi voucher and taxi called for pt. Pt ambulatory out of the ER with steady gait and all belongings.

## 2025-05-30 NOTE — ED TRIAGE NOTES
".  Chief Complaint   Patient presents with    Toe Pain     Patient with left great toe pain, states she lost her toenail approx 1 month ago and pain increased today     ./82   Pulse 74   Temp 36.9 °C (98.5 °F) (Temporal)   Resp 20   Ht 1.575 m (5' 2\")   Wt 125 kg (275 lb)   LMP  (LMP Unknown)   SpO2 90%   BMI 50.30 kg/m²   "

## 2025-05-31 ENCOUNTER — APPOINTMENT (OUTPATIENT)
Dept: RADIOLOGY | Facility: MEDICAL CENTER | Age: 50
End: 2025-05-31
Attending: EMERGENCY MEDICINE
Payer: MEDICAID

## 2025-05-31 ENCOUNTER — HOSPITAL ENCOUNTER (EMERGENCY)
Facility: MEDICAL CENTER | Age: 50
End: 2025-05-31
Attending: EMERGENCY MEDICINE
Payer: MEDICAID

## 2025-05-31 VITALS
OXYGEN SATURATION: 95 % | SYSTOLIC BLOOD PRESSURE: 138 MMHG | WEIGHT: 274 LBS | DIASTOLIC BLOOD PRESSURE: 58 MMHG | HEART RATE: 70 BPM | HEIGHT: 61 IN | TEMPERATURE: 98 F | RESPIRATION RATE: 21 BRPM | BODY MASS INDEX: 51.73 KG/M2

## 2025-05-31 DIAGNOSIS — R53.83 FATIGUE, UNSPECIFIED TYPE: ICD-10-CM

## 2025-05-31 DIAGNOSIS — R05.2 SUBACUTE COUGH: Primary | ICD-10-CM

## 2025-05-31 LAB
ALBUMIN SERPL BCP-MCNC: 3.8 G/DL (ref 3.2–4.9)
ALBUMIN/GLOB SERPL: 1.2 G/DL
ALP SERPL-CCNC: 124 U/L (ref 30–99)
ALT SERPL-CCNC: 17 U/L (ref 2–50)
ANION GAP SERPL CALC-SCNC: 9 MMOL/L (ref 7–16)
APPEARANCE UR: CLEAR
AST SERPL-CCNC: 13 U/L (ref 12–45)
BACTERIA #/AREA URNS HPF: ABNORMAL /HPF
BASOPHILS # BLD AUTO: 0.2 % (ref 0–1.8)
BASOPHILS # BLD: 0.02 K/UL (ref 0–0.12)
BILIRUB SERPL-MCNC: 0.8 MG/DL (ref 0.1–1.5)
BILIRUB UR QL STRIP.AUTO: NEGATIVE
BUN SERPL-MCNC: 16 MG/DL (ref 8–22)
CALCIUM ALBUM COR SERPL-MCNC: 9.5 MG/DL (ref 8.5–10.5)
CALCIUM SERPL-MCNC: 9.3 MG/DL (ref 8.5–10.5)
CASTS URNS QL MICRO: ABNORMAL /LPF (ref 0–2)
CHLORIDE SERPL-SCNC: 103 MMOL/L (ref 96–112)
CO2 SERPL-SCNC: 28 MMOL/L (ref 20–33)
COLOR UR: YELLOW
CREAT SERPL-MCNC: 1.25 MG/DL (ref 0.5–1.4)
EKG IMPRESSION: NORMAL
EOSINOPHIL # BLD AUTO: 0.16 K/UL (ref 0–0.51)
EOSINOPHIL NFR BLD: 1.3 % (ref 0–6.9)
EPITHELIAL CELLS 1715: ABNORMAL /HPF (ref 0–5)
ERYTHROCYTE [DISTWIDTH] IN BLOOD BY AUTOMATED COUNT: 51.8 FL (ref 35.9–50)
FLUAV RNA SPEC QL NAA+PROBE: NEGATIVE
FLUBV RNA SPEC QL NAA+PROBE: NEGATIVE
GFR SERPLBLD CREATININE-BSD FMLA CKD-EPI: 52 ML/MIN/1.73 M 2
GLOBULIN SER CALC-MCNC: 3.1 G/DL (ref 1.9–3.5)
GLUCOSE SERPL-MCNC: 117 MG/DL (ref 65–99)
GLUCOSE UR STRIP.AUTO-MCNC: NEGATIVE MG/DL
HCT VFR BLD AUTO: 37.4 % (ref 37–47)
HGB BLD-MCNC: 11.4 G/DL (ref 12–16)
IMM GRANULOCYTES # BLD AUTO: 0.04 K/UL (ref 0–0.11)
IMM GRANULOCYTES NFR BLD AUTO: 0.3 % (ref 0–0.9)
KETONES UR STRIP.AUTO-MCNC: NEGATIVE MG/DL
LEUKOCYTE ESTERASE UR QL STRIP.AUTO: ABNORMAL
LYMPHOCYTES # BLD AUTO: 1.92 K/UL (ref 1–4.8)
LYMPHOCYTES NFR BLD: 15.3 % (ref 22–41)
MCH RBC QN AUTO: 27 PG (ref 27–33)
MCHC RBC AUTO-ENTMCNC: 30.5 G/DL (ref 32.2–35.5)
MCV RBC AUTO: 88.6 FL (ref 81.4–97.8)
MICRO URNS: ABNORMAL
MONOCYTES # BLD AUTO: 0.78 K/UL (ref 0–0.85)
MONOCYTES NFR BLD AUTO: 6.2 % (ref 0–13.4)
NEUTROPHILS # BLD AUTO: 9.61 K/UL (ref 1.82–7.42)
NEUTROPHILS NFR BLD: 76.7 % (ref 44–72)
NITRITE UR QL STRIP.AUTO: NEGATIVE
NRBC # BLD AUTO: 0 K/UL
NRBC BLD-RTO: 0 /100 WBC (ref 0–0.2)
NT-PROBNP SERPL IA-MCNC: 394 PG/ML (ref 0–125)
PH UR STRIP.AUTO: 7.5 [PH] (ref 5–8)
PLATELET # BLD AUTO: 210 K/UL (ref 164–446)
PMV BLD AUTO: 11.1 FL (ref 9–12.9)
POTASSIUM SERPL-SCNC: 4.2 MMOL/L (ref 3.6–5.5)
PROT SERPL-MCNC: 6.9 G/DL (ref 6–8.2)
PROT UR QL STRIP: NEGATIVE MG/DL
RBC # BLD AUTO: 4.22 M/UL (ref 4.2–5.4)
RBC # URNS HPF: ABNORMAL /HPF (ref 0–2)
RBC UR QL AUTO: NEGATIVE
RSV RNA SPEC QL NAA+PROBE: NEGATIVE
SARS-COV-2 RNA RESP QL NAA+PROBE: NOTDETECTED
SODIUM SERPL-SCNC: 140 MMOL/L (ref 135–145)
SP GR UR STRIP.AUTO: 1
TROPONIN T SERPL-MCNC: 39 NG/L (ref 6–19)
UROBILINOGEN UR STRIP.AUTO-MCNC: 0.2 EU/DL
WBC # BLD AUTO: 12.5 K/UL (ref 4.8–10.8)
WBC #/AREA URNS HPF: ABNORMAL /HPF

## 2025-05-31 PROCEDURE — 99285 EMERGENCY DEPT VISIT HI MDM: CPT

## 2025-05-31 PROCEDURE — 0241U HCHG SARS-COV-2 COVID-19 NFCT DS RESP RNA 4 TRGT ED POC: CPT

## 2025-05-31 PROCEDURE — 36415 COLL VENOUS BLD VENIPUNCTURE: CPT

## 2025-05-31 PROCEDURE — 94640 AIRWAY INHALATION TREATMENT: CPT

## 2025-05-31 PROCEDURE — 87086 URINE CULTURE/COLONY COUNT: CPT

## 2025-05-31 PROCEDURE — 700101 HCHG RX REV CODE 250: Mod: UD | Performed by: EMERGENCY MEDICINE

## 2025-05-31 PROCEDURE — 80053 COMPREHEN METABOLIC PANEL: CPT

## 2025-05-31 PROCEDURE — 71045 X-RAY EXAM CHEST 1 VIEW: CPT

## 2025-05-31 PROCEDURE — 85025 COMPLETE CBC W/AUTO DIFF WBC: CPT

## 2025-05-31 PROCEDURE — 83880 ASSAY OF NATRIURETIC PEPTIDE: CPT

## 2025-05-31 PROCEDURE — 81001 URINALYSIS AUTO W/SCOPE: CPT

## 2025-05-31 PROCEDURE — 93005 ELECTROCARDIOGRAM TRACING: CPT | Mod: TC | Performed by: EMERGENCY MEDICINE

## 2025-05-31 PROCEDURE — 84484 ASSAY OF TROPONIN QUANT: CPT

## 2025-05-31 RX ORDER — IPRATROPIUM BROMIDE AND ALBUTEROL SULFATE 2.5; .5 MG/3ML; MG/3ML
3 SOLUTION RESPIRATORY (INHALATION) ONCE
Status: COMPLETED | OUTPATIENT
Start: 2025-05-31 | End: 2025-05-31

## 2025-05-31 RX ADMIN — IPRATROPIUM BROMIDE AND ALBUTEROL SULFATE 3 ML: .5; 2.5 SOLUTION RESPIRATORY (INHALATION) at 18:10

## 2025-05-31 ASSESSMENT — FIBROSIS 4 INDEX: FIB4 SCORE: 1.35

## 2025-05-31 NOTE — ED TRIAGE NOTES
Chief Complaint   Patient presents with    Weakness     Began today after being outside in the heat for prolonged period. -LOC    Cough     X2 weeks, +yellow sputum in the morning. Hx COPD, A.fib, +thinners (Eliquis BID)    Shortness of Breath     Pt requiring 2LNC. Normally on room air during the day, uses 2LNC at night.      . Pt reports increasing lower extremity edema. AAOx4. On 2LNC.

## 2025-06-01 NOTE — ED PROVIDER NOTES
ER Provider Note    Scribed for Barry Campbell II, M.D. by Salud Garcias. 5/31/2025  5:02 PM    Primary Care Provider: Lili Reddy M.D.    CHIEF COMPLAINT   Chief Complaint   Patient presents with    Weakness     Began today after being outside in the heat for prolonged period. -LOC    Cough     X2 weeks, +yellow sputum in the morning. Hx COPD, A.fib, +thinners (Eliquis BID)    Shortness of Breath     Pt requiring 2LNC. Normally on room air during the day, uses 2LNC at night.      EXTERNAL RECORDS REVIEWED  Outpatient Notes Reviewed note from 5/22/25.    HPI/ROS  LIMITATION TO HISTORY   Select: : None  OUTSIDE HISTORIAN(S):  None    Lorene Haro is a 49 y.o. female who presents to the ED complaining of shortness of breath onset 2 weeks ago. Patient reports associated symptoms of possible fever, cough and weakness, but denies nausea or vomiting. She reports she has atrial fibrillation, COPD and takes Eliquis, but denies CHF. She notes she was prescribed steroids and antibiotics for the left great toe.  Denies any pain symptoms.  She is worried she may be having a fever because the temperature from EMS was 99 Fahrenheit.  She denies having any chills or rigors.  She typically does have some respiratory issues but has not needed to increase her supplemental oxygen level.  No palpitations.  No new leg swelling.    PAST MEDICAL HISTORY  Past Medical History[1]    SURGICAL HISTORY  Past Surgical History[2]    FAMILY HISTORY  Family History   Problem Relation Age of Onset    No Known Problems Mother     Cancer Sister        SOCIAL HISTORY   reports that she has been smoking cigarettes. She has never used smokeless tobacco. She reports that she does not currently use alcohol. She reports that she does not use drugs.    CURRENT MEDICATIONS  Previous Medications    ALBUTEROL 108 (90 BASE) MCG/ACT AERO SOLN INHALATION AEROSOL    Inhale 2 Puffs every 6 hours as needed for Shortness of Breath.    APIXABAN (ELIQUIS)  "5MG TAB    Take 1 Tablet by mouth 2 times a day.    ARIPIPRAZOLE (ABILIFY) 30 MG TABLET    Take 1 Tablet by mouth every morning. Indications: Major Depressive Disorder    ASCORBIC ACID (VITAMIN C) 500 MG TABLET    Take 500 mg by mouth every day.    ATORVASTATIN (LIPITOR) 20 MG TAB    Take 1 Tablet by mouth at bedtime.    BENZONATATE (TESSALON) 100 MG CAP    Take 1 Capsule by mouth 3 times a day as needed for Cough.    CYCLOBENZAPRINE (FLEXERIL) 10 MG TAB    Take 1 Tablet by mouth 3 times a day as needed for Moderate Pain or Muscle Spasms.    FERROUS SULFATE 325 (65 FE) MG TABLET    Take 325 mg by mouth every 48 hours.    FLUTICASONE (FLONASE) 50 MCG/ACT NASAL SPRAY    Administer 2 Sprays into each nostril every morning.    FLUTICASONE-SALMETEROL (ADVAIR DISKUS) 100-50 MCG/ACT AEROSOL POWDER, BREATH ACTIVATED    Inhale 1 Puff 2 times a day.    GABAPENTIN (NEURONTIN) 300 MG CAP    Take 1 Capsule by mouth 3 times a day.    HYDROXYZINE HCL (ATARAX) 25 MG TAB    Take 1 Tablet by mouth 3 times a day as needed for Itching.    KETOCONAZOLE (NIZORAL) 2 % CREAM    Apply 1 Application topically 2 times a day.    LIDOCAINE (LIDODERM) 5 % PATCH    Place 1 Patch on the skin every 12 hours.    METOPROLOL SR (TOPROL XL) 50 MG TABLET SR 24 HR    Take 1 Tablet by mouth every day.    MONTELUKAST (SINGULAIR) 10 MG TAB    Take 1 Tablet by mouth every evening.    VITAMIN D, CHOLECALCIFEROL, (CHOLECALCIFEROL) 25 MCG (1000 UT) TAB    Take 2 Tablets by mouth every day.       ALLERGIES  Amoxicillin and Penicillin g    PHYSICAL EXAM  /65   Pulse 71   Temp 37.5 °C (99.5 °F) (Temporal)   Resp 20   Ht 1.549 m (5' 1\")   Wt 124 kg (274 lb)   LMP  (LMP Unknown)   SpO2 95%   BMI 51.77 kg/m²     Physical Exam  Vitals and nursing note reviewed.   Constitutional:       Appearance: Normal appearance.   HENT:      Head: Normocephalic and atraumatic.      Mouth/Throat:      Mouth: Mucous membranes are moist.   Eyes:      Extraocular " Movements: Extraocular movements intact.      Pupils: Pupils are equal, round, and reactive to light.   Cardiovascular:      Rate and Rhythm: Normal rate and regular rhythm.   Pulmonary:      Comments: Wearing 2L NC, increased respiratory rate.   Abdominal:      General: There is no distension.      Tenderness: There is no abdominal tenderness. There is no guarding.   Musculoskeletal:      Comments: Left great toe with toenail avulsion, no discharge. Bilateral lower extremity edema with chronic skin changes   Neurological:      General: No focal deficit present.      Mental Status: She is alert and oriented to person, place, and time.      Motor: No weakness.   Psychiatric:         Mood and Affect: Mood normal.          DIAGNOSTIC STUDIES    EKG/LABS  Labs Reviewed   CBC WITH DIFFERENTIAL - Abnormal; Notable for the following components:       Result Value    WBC 12.5 (*)     Hemoglobin 11.4 (*)     MCHC 30.5 (*)     RDW 51.8 (*)     Neutrophils-Polys 76.70 (*)     Lymphocytes 15.30 (*)     Neutrophils (Absolute) 9.61 (*)     All other components within normal limits   COMP METABOLIC PANEL - Abnormal; Notable for the following components:    Glucose 117 (*)     Alkaline Phosphatase 124 (*)     All other components within normal limits   PROBRAIN NATRIURETIC PEPTIDE, NT - Abnormal; Notable for the following components:    NT-proBNP 394 (*)     All other components within normal limits   TROPONIN - Abnormal; Notable for the following components:    Troponin T 39 (*)     All other components within normal limits   ESTIMATED GFR - Abnormal; Notable for the following components:    GFR (CKD-EPI) 52 (*)     All other components within normal limits   URINALYSIS - Abnormal; Notable for the following components:    Leukocyte Esterase Moderate (*)     All other components within normal limits   URINE MICROSCOPIC (W/UA) - Abnormal; Notable for the following components:    WBC 21-50 (*)     All other components within normal  limits   URINE CULTURE(NEW)   POCT COV-2, FLU A/B, RSV BY PCR   POC COV-2, FLU A/B, RSV BY PCR      I have independently interpreted this EKG    RADIOLOGY/PROCEDURES   The attending emergency physician has independently interpreted the diagnostic imaging associated with this visit and am waiting the final reading from the radiologist.   My preliminary interpretation is a follows: No focal pneumonia    Radiologist interpretation:  DX-CHEST-PORTABLE (1 VIEW)   Final Result      Cardiomegaly. No acute abnormality.          COURSE & MEDICAL DECISION MAKING     ASSESSMENT, COURSE AND PLAN  Care Narrative: 5:10 PM - This is an emergency department evaluation of a 49 year old woman with chronic respiratory failure, COPD, afib on Eliquis who presents with cough, sob.  She is concerned she may have an infection.  Ddx includes possible pneumonia, COPD exacerbation, CHF. Labs being done. XR done and I will review. EKG with sinus rhythm and no repolarization changes.     7:33 PM - I reevaluated the patient at bedside. I discussed the patient's chest x-ray and lab results which show no focal pneumonia, cardiomegaly, and elevated BNP & Troponin at her baseline. Patient informs me she feels improved after the single breathing treatment.  I considered initiating steroid course but she just recently finished 1.  She is not having obvious bronchospasm, increased oxygenation needs, or increased work of breathing.  I discussed plan for discharge and follow up as outlined below. The patient is stable for discharge at this time and will return for any new or worsening symptoms. Patient verbalizes understanding and support with my plan for discharge.      9:04 PM  Urinalysis did not appear to have infection.  There is no bacteria.  There were 2050 white blood cells.  She is not having urinary frequency or pain.  Urine culture was sent.  She later requested viral testing and this was also negative.  She was monitored here for a few hours  and had no signs of worsening.  She does have significant chronic disease and does appear much older than her stated age, fatigued but I do believe this is baseline for her.  Reviewed plan for discharge and she was agreeable with this plan.  Recommended she follow-up with their primary care provider for continued evaluation and care.  She can come back here if she is having significant worsening.       PROBLEM LIST  #Cough   - Subacute/chronic   - Improved with single bronchodilator treatment.  Cardiopulmonary testing similar to previous.  I do not believe she is in acute heart failure.    # Fatigue   - Chronic, no evidence of acute organ failure    # Left great toe nail avulsion   - Redress.  No signs of cellulitis.    DISPOSITION AND DISCUSSIONS  I have discussed management of the patient with the following physicians and JAY's:  None    Discussion of management with other [facility] or appropriate source(s): None     Escalation of care considered, and ultimately not performed: acute inpatient care management, however at this time, the patient is most appropriate for outpatient management.    Barriers to care at this time, including but not limited to: None.     The patient will return for new or worsening symptoms and is stable at the time of discharge.    The patient is referred to a primary physician for blood pressure management, diabetic screening, and for all other preventative health concerns.  DISPOSITION:  Patient will be discharged home in stable condition.    FOLLOW UP:  Lili Reddy M.D.  [address]  [address] [address] [phone]    Schedule an appointment as soon as possible for a visit   For further evaluation    [facility], Emergency Dept  [address] [address] [phone]    If symptoms worsen      OUTPATIENT MEDICATIONS:  New Prescriptions    No medications on file        FINAL DIAGNOSIS  1. Subacute cough    2. Fatigue, unspecified type          ISalud (Ade), am scribing for, and in the presence of, Barry Campbell II, M.D.    Electronically signed by: Salud Garcias (Ade), 5/31/2025    Barry HARRIS II, M.D. personally performed the services described in this documentation, as scribed by Salud Garcias in my presence, and it is both accurate and complete.     The note accurately reflects work and decisions made by me.  Barry Campbell II, M.D.  5/31/2025  9:07 PM         [1]   Past Medical History:  Diagnosis Date    A-fib (HCC)     MONI (acute kidney injury) (HCC) 08/09/2023    Asthma     Bipolar 2 disorder (HCC)     Chickenpox     Chronic obstructive pulmonary disease (HCC)     Congestive heart failure (HCC)     Hypertension     On home oxygen therapy     Other psychological stress     Schizophrenic disorder (HCC)     Yeast infection of the skin 04/01/2021   [2]   Past Surgical History:  Procedure Laterality Date    ORIF, ANKLE Left 11/2/2024    Procedure: ORIF, ANKLE- TALUS;  Surgeon: Roman Rosa M.D.;  Location: SURGERY [facility];  Service: Orthopedics    CHOLECYSTECTOMY      COLECTOMY      HYSTERECTOMY LAPAROSCOPY      KNEE ARTHROSCOPY Left

## 2025-06-01 NOTE — ED NOTES
Bedside report received by RN Rodney    Oxygen: 2 L NC  Fall Risk status: bed in lowest position/locked, call light within reach  Patient Mentation:A+O x 4  Pending results

## 2025-06-03 LAB
BACTERIA UR CULT: NORMAL
SIGNIFICANT IND 70042: NORMAL
SITE SITE: NORMAL
SOURCE SOURCE: NORMAL

## 2025-06-05 NOTE — ASSESSMENT & PLAN NOTE
Discharge Instructions Following a Fistulagram/Fistula Repair    Activity  You may resume all activities that you had been permitted to perform prior to the test with these restrictions:  Go home and rest quietly for the remainder of the day.   Avoid strenuous activities such as heavy lifting (over 10 pounds) or athletics.   On the day following your procedure, you may resume your normal activities.   If your fistula is in your arm, it should rest quietly at your side. If your fistula is in your leg, you are encouraged to lie down at home. Avoid sitting, since in increases your chances of bleeding.    Diet  You may resume your normal diet after the procedure. Avoid alcoholic beverages for 24 hours.  Do not drive for 24 hours after receiving sedation.    Personal hygiene  Tomorrow you may sponge bathe or shower as usual.   The dialysis center will change the dressing after your next dialysis. If a suture was placed, it can be removed by the dialysis RN at your next appointment. If not removed at the next dialysis appointment, call the interventional radiology department to schedule an appointment to remove the suture. If you are not undergoing dialysis at this time, you may remove the bandage strip and gently cleanse the fistulogram site with soap and water. Dry thoroughly.   Apply a new bandage strip if there is any leakage or drainage.   Do not apply lotions, powders or creams to the procedure site.    Additional information:  During the fistulagram, the Radiologist may have used a small balloon catheter to open any narrowed blood vessels in your fistula. This is called angioplasty. In order to keep these blood vessels open and your graft working, the Radiologist may have also placed a small mesh tube called a stent.   Whether you had diagnostic fistulagram or the above described fistula repair, the dialysis center, your nephrologist and the surgeon that placed your graft will all be informed of the results of this  New onset cirrhosis  -Monitor volume status  -Continue IV Lasix ordered  -Monitor strict ins and outs and daily weights  -Trend labs   study/intervention.  Some tenderness around the puncture site(s) is a common and normal occurrence. This discomfort may be treated with a mild pain medication such as Tylenol. If there is persistent pain, notify the radiology department or your doctor.    Notify your doctor or the Radiology Nursing Department ([phone]) if you develop any of the following symptoms:  Temperature of greater than 101 degrees  Bleeding, swelling or increased pain.  Change in color, motion or sensation (feeling) in your arm or fingers.    *If you cannot reach the radiology nursing department or you feel your problem is a medical emergency, go to the nearest Emergency Department for medical attention or call 911.      Want to Say “Thank You” to a Nurse?  The [employer] Award® was created in memory of ABELARDO Avilez by his family to say thank you to bedside nurses who provide an outstanding level of care.  Submit a nomination using any method below.     OR    [email or web address]

## 2025-06-06 ENCOUNTER — APPOINTMENT (OUTPATIENT)
Dept: RADIOLOGY | Facility: MEDICAL CENTER | Age: 50
End: 2025-06-06
Attending: EMERGENCY MEDICINE
Payer: MEDICAID

## 2025-06-06 ENCOUNTER — HOSPITAL ENCOUNTER (EMERGENCY)
Facility: MEDICAL CENTER | Age: 50
End: 2025-06-06
Attending: EMERGENCY MEDICINE
Payer: MEDICAID

## 2025-06-06 VITALS
OXYGEN SATURATION: 94 % | HEART RATE: 68 BPM | RESPIRATION RATE: 18 BRPM | DIASTOLIC BLOOD PRESSURE: 60 MMHG | BODY MASS INDEX: 51.77 KG/M2 | TEMPERATURE: 97 F | WEIGHT: 274 LBS | SYSTOLIC BLOOD PRESSURE: 129 MMHG

## 2025-06-06 DIAGNOSIS — S96.912A STRAIN OF LEFT FOOT, INITIAL ENCOUNTER: Primary | ICD-10-CM

## 2025-06-06 PROCEDURE — 700102 HCHG RX REV CODE 250 W/ 637 OVERRIDE(OP): Mod: UD | Performed by: EMERGENCY MEDICINE

## 2025-06-06 PROCEDURE — 96372 THER/PROPH/DIAG INJ SC/IM: CPT

## 2025-06-06 PROCEDURE — 73650 X-RAY EXAM OF HEEL: CPT | Mod: LT

## 2025-06-06 PROCEDURE — 99284 EMERGENCY DEPT VISIT MOD MDM: CPT

## 2025-06-06 PROCEDURE — A9270 NON-COVERED ITEM OR SERVICE: HCPCS | Mod: UD | Performed by: EMERGENCY MEDICINE

## 2025-06-06 PROCEDURE — 73630 X-RAY EXAM OF FOOT: CPT | Mod: LT

## 2025-06-06 PROCEDURE — 700111 HCHG RX REV CODE 636 W/ 250 OVERRIDE (IP): Mod: JZ | Performed by: EMERGENCY MEDICINE

## 2025-06-06 RX ORDER — ACETAMINOPHEN 500 MG
1000 TABLET ORAL ONCE
Status: COMPLETED | OUTPATIENT
Start: 2025-06-06 | End: 2025-06-06

## 2025-06-06 RX ORDER — KETOROLAC TROMETHAMINE 15 MG/ML
15 INJECTION, SOLUTION INTRAMUSCULAR; INTRAVENOUS ONCE
Status: COMPLETED | OUTPATIENT
Start: 2025-06-06 | End: 2025-06-06

## 2025-06-06 RX ORDER — IBUPROFEN 600 MG/1
600 TABLET, FILM COATED ORAL ONCE
Status: COMPLETED | OUTPATIENT
Start: 2025-06-06 | End: 2025-06-06

## 2025-06-06 RX ADMIN — ACETAMINOPHEN 1000 MG: 500 TABLET ORAL at 18:14

## 2025-06-06 RX ADMIN — IBUPROFEN 600 MG: 600 TABLET ORAL at 18:14

## 2025-06-06 RX ADMIN — KETOROLAC TROMETHAMINE 15 MG: 15 INJECTION, SOLUTION INTRAMUSCULAR; INTRAVENOUS at 18:54

## 2025-06-06 ASSESSMENT — PAIN DESCRIPTION - PAIN TYPE
TYPE: ACUTE PAIN
TYPE: ACUTE PAIN

## 2025-06-06 ASSESSMENT — FIBROSIS 4 INDEX: FIB4 SCORE: 0.77

## 2025-06-06 NOTE — ED TRIAGE NOTES
"Chief Complaint   Patient presents with    Foot Pain     Left foot pain, \"feels like the back part wants to open up.\"     Pt reports Hx surgery to her left foot.  /62   Pulse 75   Temp 37.2 °C (98.9 °F) (Temporal)   Resp 16   Wt 124 kg (274 lb)   SpO2 94%   Pt informed of wait times. Educated on triage process.  Asked to return to triage RN for any new or worsening of symptoms. Thanked for patience.      "

## 2025-06-07 NOTE — ED PROVIDER NOTES
"ER Provider Note    Scribed for David Kong M.d. by Timothy Christine. 6/6/2025  6:14 PM    Primary Care Provider: Lili Reddy M.D.    CHIEF COMPLAINT  Chief Complaint   Patient presents with    Foot Pain     Left foot pain, \"feels like the back part wants to open up.\"     EXTERNAL RECORDS REVIEWED  Inpatient and outpatient records show very regular emergency department visits, about 5/month on average, usually from minor complaints, and often, seemingly associated with mild to moderate but significant cognitive difficulties/health literacy challenge.    HPI/ROS  LIMITATION TO HISTORY   Select: Significant history of developmental delay.    OUTSIDE HISTORIAN(S):  None    Lorene Haro is a 49 y.o. female who presents to the ED for evaluation of left foot pain. The patient reports she stumbled causing the left foot and ankle pain. Denies any recent falls. She states she has had two recent surgeries here, but is unsure what they were at this time.      PAST MEDICAL HISTORY  Past Medical History[1]    SURGICAL HISTORY  Past Surgical History[2]    FAMILY HISTORY  Family History   Problem Relation Age of Onset    No Known Problems Mother     Cancer Sister        SOCIAL HISTORY   reports that she has been smoking cigarettes. She has never used smokeless tobacco. She reports that she does not currently use alcohol. She reports that she does not use drugs.    CURRENT MEDICATIONS  Previous Medications    ALBUTEROL 108 (90 BASE) MCG/ACT AERO SOLN INHALATION AEROSOL    Inhale 2 Puffs every 6 hours as needed for Shortness of Breath.    APIXABAN (ELIQUIS) 5MG TAB    Take 1 Tablet by mouth 2 times a day.    ARIPIPRAZOLE (ABILIFY) 30 MG TABLET    Take 1 Tablet by mouth every morning. Indications: Major Depressive Disorder    ASCORBIC ACID (VITAMIN C) 500 MG TABLET    Take 500 mg by mouth every day.    ATORVASTATIN (LIPITOR) 20 MG TAB    Take 1 Tablet by mouth at bedtime.    BENZONATATE (TESSALON) 100 MG CAP    " Take 1 Capsule by mouth 3 times a day as needed for Cough.    CYCLOBENZAPRINE (FLEXERIL) 10 MG TAB    Take 1 Tablet by mouth 3 times a day as needed for Moderate Pain or Muscle Spasms.    FERROUS SULFATE 325 (65 FE) MG TABLET    Take 325 mg by mouth every 48 hours.    FLUTICASONE (FLONASE) 50 MCG/ACT NASAL SPRAY    Administer 2 Sprays into each nostril every morning.    FLUTICASONE-SALMETEROL (ADVAIR DISKUS) 100-50 MCG/ACT AEROSOL POWDER, BREATH ACTIVATED    Inhale 1 Puff 2 times a day.    GABAPENTIN (NEURONTIN) 300 MG CAP    Take 1 Capsule by mouth 3 times a day.    HYDROXYZINE HCL (ATARAX) 25 MG TAB    Take 1 Tablet by mouth 3 times a day as needed for Itching.    KETOCONAZOLE (NIZORAL) 2 % CREAM    Apply 1 Application topically 2 times a day.    LIDOCAINE (LIDODERM) 5 % PATCH    Place 1 Patch on the skin every 12 hours.    METOPROLOL SR (TOPROL XL) 50 MG TABLET SR 24 HR    Take 1 Tablet by mouth every day.    MONTELUKAST (SINGULAIR) 10 MG TAB    Take 1 Tablet by mouth every evening.    VITAMIN D, CHOLECALCIFEROL, (CHOLECALCIFEROL) 25 MCG (1000 UT) TAB    Take 2 Tablets by mouth every day.       ALLERGIES  Amoxicillin and Penicillin   PHYSICAL EXAM  VITAL SIGNS: /62   Pulse 75   Temp 37.2 °C (98.9 °F) (Temporal)   Resp 16   Wt 124 kg (274 lb)   LMP  (LMP Unknown)   SpO2 94% Comment: 2L NC  BMI 51.77 kg/m²   Pulse ox interpretation: I interpret this pulse ox as normal.  Constitutional: Alert in no apparent distress.  HENT: No signs of trauma, Bilateral external ears normal, Nose normal.   Eyes: Conjunctiva normal, Non-icteric.   Neck: Normal range of motion, Supple, No stridor.   Lymphatic: No lymphadenopathy noted.   Cardiovascular: Regular rate and rhythm, no murmurs.   Thorax & Lungs: Normal breath sounds, No respiratory distress, No wheezing  Abdomen: Bowel sounds normal, Soft, No tenderness, No masses, No pulsatile masses. No peritoneal signs.  Skin: Warm, Dry, No erythema, No rash.   Back: No  midline bony tenderness.   Extremities: Diffuse tenderness to the left foot and ankle including heel with no visible or palpable bony deformity. No soft tissue abnormalities compared to the right side. Intact distal pulses, No edema, No cyanosis.  Musculoskeletal: Good range of motion in all major joints. No or major deformities noted.   Neurologic: Alert , Normal motor function, Normal sensory function, No focal deficits noted.   Psychiatric: Affect normal, Judgment normal, Mood normal.     DIAGNOSTIC STUDIES    Radiology:   The attending emergency physician has independently interpreted the diagnostic imaging associated with this visit and am waiting the final reading from the radiologist.     Preliminary interpretation is a follows: No acute injury.    Radiologist interpretation:   DX-OS CALCIS (HEEL) 2+ LEFT   Final Result      1.  No radiographic evidence of acute injury.   2.  Previous screw fixation of left talus for treatment of a vertical talar fracture.      DX-FOOT-COMPLETE 3+ LEFT   Final Result      1.  Osteopenia.   2.  No radiographic evidence of acute injury.   3.  Previous screw fixation of left talus.          COURSE & MEDICAL DECISION MAKING     INITIAL ASSESSMENT, COURSE AND PLAN  Care Narrative:     6:14 PM Patient presents to the ED with left foot pain.  She has tenderness to the foot, heel, and ankle, with no visible or palpable abnormality, no asymmetry of any sort compared to the other side.  This is after a stumble without a fall.  Patient's severe morbid obesity may be playing a factor.  Patient evaluated at bedside and discussed plan of care, including imaging. Patient will be treated with Tylenol 1,000 mg Tablet, Motrin 600 mg Tablet, and Toradol 15 mg 15 mg/mL injection. Ordered for DX-OS Calcis (Heel) 2+ Left and DX-Foot-Complete 3+ Left to evaluate her symptoms. Differential diagnoses include but not limited to: Favors sprain or strain of the left foot and ankle with fracture being  much less likely.    6:35 PM - Imaging has resulted and were unremarkable.    6:36 PM - Patient was reevaluated at bedside. I informed the patient of her imaging results which were normal. I instructed the patient to use Tylenol or Ibuprofen to treat her pain. I discussed applying an ACE wrap to the left foot and ankle. I discussed plan for discharge and follow up as outlined below. The patient is stable for discharge at this time and will return for any new or worsening symptoms. Patient verbalizes understanding and support with my plan for discharge.        DISPOSITION AND DISCUSSIONS  I have discussed management of the patient with the following physicians and JAY's:  None    Discussion of management with other [facility] or appropriate source(s): None     Escalation of care considered, and ultimately not performed: Laboratory analysis.  No traumatic injury necessitating hospitalization or surgery or preoperative testing.    Barriers to care at this time, including but not limited to: None.     Decision tools and prescription drugs considered including, but not limited to: Pain Medications considered, but without acute fracture or dislocation, OTC medications should be sufficient.      The patient will return for new or worsening symptoms and is stable at the time of discharge.    The patient is referred to a primary physician for blood pressure management, diabetic screening, and for all other preventative health concerns.    DISPOSITION:  Patient will be discharged home in stable condition.    FOLLOW UP:  Lili Reddy M.D.  [address] [place] [address] [address]  [address] [id number] [phone]      As needed      OUTPATIENT MEDICATIONS:  New Prescriptions    No medications on file         FINAL DIAGNOSIS  1. Strain of left foot, initial encounter          I, Timothy Christine (Ade), am scribing for, and in the presence of, David Kong M.D..    Electronically signed by: Timothy Christine (Ade),  6/6/2025    David HARRIS M.D. personally performed the services described in this documentation, as scribed by Timothy Christine in my presence, and it is both accurate and complete.           [1]   Past Medical History:  Diagnosis Date    A-fib (HCC)     MONI (acute kidney injury) (HCC) 08/09/2023    Asthma     Bipolar 2 disorder (HCC)     Chickenpox     Chronic obstructive pulmonary disease (HCC)     Congestive heart failure (HCC)     Hypertension     On home oxygen therapy     Other psychological stress     Schizophrenic disorder (HCC)     Yeast infection of the skin 04/01/2021   [2]   Past Surgical History:  Procedure Laterality Date    ORIF, ANKLE Left 11/2/2024    Procedure: ORIF, ANKLE- TALUS;  Surgeon: Roman Rosa M.D.;  Location: SURGERY [facility];  Service: Orthopedics    CHOLECYSTECTOMY      COLECTOMY      HYSTERECTOMY LAPAROSCOPY      KNEE ARTHROSCOPY Left

## 2025-06-07 NOTE — ED NOTES
Discharge instructions provided by ERP. AVS provided and reviewed with patient. Patient AOx4 and ambulatory with rollator walker, no IV placed during ED visit. Patient 94% RA. Patient discharged to self without incident.

## 2025-06-07 NOTE — DISCHARGE INSTRUCTIONS
Your x-rays were normal.  There is no sign of new injuries.  There is no sign of problems with your previous surgery.  Use Tylenol or ibuprofen or which ever you prefer for pain.  We can ACE wrap your foot and ankle for you if you would like.

## 2025-06-07 NOTE — ED NOTES
Patient wheeled to [facility] and transferred to [facility] without incident. Primary assessment complete. Patient oriented to care area. Chart up for ERP.

## 2025-06-15 PROCEDURE — 0241U HCHG SARS-COV-2 COVID-19 NFCT DS RESP RNA 4 TRGT ED POC: CPT

## 2025-06-15 PROCEDURE — 99284 EMERGENCY DEPT VISIT MOD MDM: CPT

## 2025-06-15 ASSESSMENT — FIBROSIS 4 INDEX: FIB4 SCORE: 0.77

## 2025-06-16 ENCOUNTER — APPOINTMENT (OUTPATIENT)
Dept: RADIOLOGY | Facility: MEDICAL CENTER | Age: 50
End: 2025-06-16
Attending: EMERGENCY MEDICINE
Payer: MEDICAID

## 2025-06-16 ENCOUNTER — PHARMACY VISIT (OUTPATIENT)
Dept: PHARMACY | Facility: MEDICAL CENTER | Age: 50
End: 2025-06-16
Payer: COMMERCIAL

## 2025-06-16 ENCOUNTER — HOSPITAL ENCOUNTER (EMERGENCY)
Facility: MEDICAL CENTER | Age: 50
End: 2025-06-16
Attending: EMERGENCY MEDICINE
Payer: MEDICAID

## 2025-06-16 VITALS
TEMPERATURE: 97 F | BODY MASS INDEX: 46.78 KG/M2 | HEART RATE: 57 BPM | SYSTOLIC BLOOD PRESSURE: 135 MMHG | RESPIRATION RATE: 18 BRPM | OXYGEN SATURATION: 98 % | DIASTOLIC BLOOD PRESSURE: 55 MMHG | HEIGHT: 64 IN | WEIGHT: 274 LBS

## 2025-06-16 DIAGNOSIS — J06.9 UPPER RESPIRATORY TRACT INFECTION, UNSPECIFIED TYPE: Primary | ICD-10-CM

## 2025-06-16 DIAGNOSIS — R11.0 NAUSEA: ICD-10-CM

## 2025-06-16 DIAGNOSIS — J44.89 ASTHMA-COPD OVERLAP SYNDROME (HCC): ICD-10-CM

## 2025-06-16 DIAGNOSIS — R05.1 ACUTE COUGH: ICD-10-CM

## 2025-06-16 DIAGNOSIS — I48.0 PAROXYSMAL ATRIAL FIBRILLATION (HCC): ICD-10-CM

## 2025-06-16 DIAGNOSIS — B34.9 VIRAL SYNDROME: ICD-10-CM

## 2025-06-16 LAB
ALBUMIN SERPL BCP-MCNC: 4.3 G/DL (ref 3.2–4.9)
ALBUMIN/GLOB SERPL: 1.3 G/DL
ALP SERPL-CCNC: 133 U/L (ref 30–99)
ALT SERPL-CCNC: 12 U/L (ref 2–50)
ANION GAP SERPL CALC-SCNC: 11 MMOL/L (ref 7–16)
AST SERPL-CCNC: 12 U/L (ref 12–45)
BASOPHILS # BLD AUTO: 0.2 % (ref 0–1.8)
BASOPHILS # BLD: 0.02 K/UL (ref 0–0.12)
BILIRUB SERPL-MCNC: 0.7 MG/DL (ref 0.1–1.5)
BUN SERPL-MCNC: 19 MG/DL (ref 8–22)
CALCIUM ALBUM COR SERPL-MCNC: 9.8 MG/DL (ref 8.5–10.5)
CALCIUM SERPL-MCNC: 10 MG/DL (ref 8.5–10.5)
CHLORIDE SERPL-SCNC: 102 MMOL/L (ref 96–112)
CO2 SERPL-SCNC: 28 MMOL/L (ref 20–33)
CREAT SERPL-MCNC: 1.16 MG/DL (ref 0.5–1.4)
EOSINOPHIL # BLD AUTO: 0.15 K/UL (ref 0–0.51)
EOSINOPHIL NFR BLD: 1.4 % (ref 0–6.9)
ERYTHROCYTE [DISTWIDTH] IN BLOOD BY AUTOMATED COUNT: 49.2 FL (ref 35.9–50)
GFR SERPLBLD CREATININE-BSD FMLA CKD-EPI: 57 ML/MIN/1.73 M 2
GLOBULIN SER CALC-MCNC: 3.2 G/DL (ref 1.9–3.5)
GLUCOSE SERPL-MCNC: 104 MG/DL (ref 65–99)
HCT VFR BLD AUTO: 39.7 % (ref 37–47)
HGB BLD-MCNC: 12.1 G/DL (ref 12–16)
IMM GRANULOCYTES # BLD AUTO: 0.06 K/UL (ref 0–0.11)
IMM GRANULOCYTES NFR BLD AUTO: 0.6 % (ref 0–0.9)
LIPASE SERPL-CCNC: 30 U/L (ref 11–82)
LYMPHOCYTES # BLD AUTO: 2.45 K/UL (ref 1–4.8)
LYMPHOCYTES NFR BLD: 22.7 % (ref 22–41)
MCH RBC QN AUTO: 26.9 PG (ref 27–33)
MCHC RBC AUTO-ENTMCNC: 30.5 G/DL (ref 32.2–35.5)
MCV RBC AUTO: 88.4 FL (ref 81.4–97.8)
MONOCYTES # BLD AUTO: 0.73 K/UL (ref 0–0.85)
MONOCYTES NFR BLD AUTO: 6.8 % (ref 0–13.4)
NEUTROPHILS # BLD AUTO: 7.4 K/UL (ref 1.82–7.42)
NEUTROPHILS NFR BLD: 68.3 % (ref 44–72)
NRBC # BLD AUTO: 0 K/UL
NRBC BLD-RTO: 0 /100 WBC (ref 0–0.2)
PLATELET # BLD AUTO: 238 K/UL (ref 164–446)
PMV BLD AUTO: 11.4 FL (ref 9–12.9)
POTASSIUM SERPL-SCNC: 4.5 MMOL/L (ref 3.6–5.5)
PROT SERPL-MCNC: 7.5 G/DL (ref 6–8.2)
RBC # BLD AUTO: 4.49 M/UL (ref 4.2–5.4)
S PYO DNA SPEC NAA+PROBE: NOT DETECTED
SODIUM SERPL-SCNC: 141 MMOL/L (ref 135–145)
WBC # BLD AUTO: 10.8 K/UL (ref 4.8–10.8)

## 2025-06-16 PROCEDURE — 80053 COMPREHEN METABOLIC PANEL: CPT

## 2025-06-16 PROCEDURE — 87651 STREP A DNA AMP PROBE: CPT

## 2025-06-16 PROCEDURE — 36415 COLL VENOUS BLD VENIPUNCTURE: CPT

## 2025-06-16 PROCEDURE — 71045 X-RAY EXAM CHEST 1 VIEW: CPT

## 2025-06-16 PROCEDURE — 83690 ASSAY OF LIPASE: CPT

## 2025-06-16 PROCEDURE — A9270 NON-COVERED ITEM OR SERVICE: HCPCS | Performed by: EMERGENCY MEDICINE

## 2025-06-16 PROCEDURE — 85025 COMPLETE CBC W/AUTO DIFF WBC: CPT

## 2025-06-16 PROCEDURE — 700102 HCHG RX REV CODE 250 W/ 637 OVERRIDE(OP): Performed by: EMERGENCY MEDICINE

## 2025-06-16 PROCEDURE — RXMED WILLOW AMBULATORY MEDICATION CHARGE: Performed by: EMERGENCY MEDICINE

## 2025-06-16 RX ORDER — APIXABAN 5 MG/1
5 TABLET, FILM COATED ORAL 2 TIMES DAILY
Qty: 60 TABLET | Refills: 0 | Status: SHIPPED | OUTPATIENT
Start: 2025-06-16

## 2025-06-16 RX ORDER — ONDANSETRON 4 MG/1
4 TABLET, ORALLY DISINTEGRATING ORAL EVERY 6 HOURS PRN
Qty: 10 TABLET | Refills: 0 | Status: SHIPPED | OUTPATIENT
Start: 2025-06-16 | End: 2025-06-29 | Stop reason: SDUPTHER

## 2025-06-16 RX ORDER — BENZONATATE 200 MG/1
200 CAPSULE ORAL 3 TIMES DAILY PRN
Qty: 30 CAPSULE | Refills: 0 | Status: SHIPPED | OUTPATIENT
Start: 2025-06-16 | End: 2025-06-26

## 2025-06-16 RX ADMIN — LIDOCAINE HYDROCHLORIDE 30 ML: 20 SOLUTION ORAL; TOPICAL at 02:03

## 2025-06-16 NOTE — ED TRIAGE NOTES
Pt to ER for complaints of flu like symptoms. Cough with yellow sputum production, body aches, chills, diarrhea. She lives in a group home ( [address] ) and other people in her group home are ill.  Received 1 gr tylenol and 600 mg Ibuprofen by EMS.    Pt educated on ED process and asked to wait in lobby. Patient educated on importance of alerting staff to new or worsening symptoms or concerns.

## 2025-06-16 NOTE — ED NOTES
"Lorene Haro has been discharged from the Emergency Room.    Pt in possession of belongings.    Discharge instructions, which include signs and symptoms to monitor patient for, as well as detailed information regarding viral syndrome provided.  Patient verbalizes understanding of follow up care and medication management. All questions and concerns addressed at this time.     Patient provided with education on when to return to the ER and verbally understands with no concerns. Patient advised on setting up MyChart and information provided about patient survey.  Patient leaves ER in no apparent distress. This RN provided education regarding returning to the ER for any new concerns or changes in patient's condition.      /55   Pulse (!) 57   Temp 36.1 °C (97 °F) (Temporal)   Resp 18   Ht 1.626 m (5' 4\")   Wt 124 kg (274 lb)   LMP  (LMP Unknown)   SpO2 98%   BMI 47.03 kg/m²    "

## 2025-06-16 NOTE — ED PROVIDER NOTES
ED Provider Note    CHIEF COMPLAINT  Chief Complaint   Patient presents with    Flu Like Symptoms    Cough    Diarrhea       EXTERNAL RECORDS REVIEWED  Prior ER note is reviewed from 6/6/2025 with the patient was evaluated for some left foot pain.  The patient shows very regular emergency department visits about 5 a month with mostly minor complaints and some that are seemingly associated with some mild or moderate cognitive difficulties with some health literacy challenges and educational deficits.    HPI/ROS  LIMITATION TO HISTORY   Select: Developmental delay  OUTSIDE HISTORIAN(S):  none    Lorene Haro is a 49 y.o. female who presents to the emergency room for concerns regarding body aches and chills and a recent development of a cough.  She has had some elements of loose stools over the course of about 2 or 3 weeks and had been started on some antibiotics for an infected toe.  She has not had diarrhea, she has not had abdominal cramping with that however she did develop some upper abdominal discomfort with the development of this most recent cough.  She feels like these are somewhat slightly different, she has not had a fever and denies any urinary symptoms.  She feels like her most distressing symptom is the body aches with the cough and a sore throat over the last 2 to 3 days.  She is denying any urinary symptoms, no vaginal complaints, she has COPD and has used her inhaler.  She is on 2 L of oxygen at night, says that she feels like she is slightly sick but otherwise doing okay.    Multiple sick contacts at her group home.  She is endorsing a productive cough, she had received Tylenol and ibuprofen prior to arrival.    PAST MEDICAL HISTORY   has a past medical history of A-fib ([facility]), MONI (acute kidney injury) ([facility]) (08/09/2023), Asthma, Bipolar 2 disorder ([facility]), Chickenpox, Chronic obstructive pulmonary disease ([facility]), Congestive heart failure ([facility]), Hypertension, On home oxygen therapy, Other  "psychological stress, Schizophrenic disorder ([facility]), and Yeast infection of the skin (2021).    SURGICAL HISTORY   has a past surgical history that includes hysterectomy laparoscopy; colectomy; cholecystectomy; knee arthroscopy (Left); and orif, ankle (Left, 2024).    FAMILY HISTORY  Family History   Problem Relation Age of Onset    No Known Problems Mother     Cancer Sister        SOCIAL HISTORY  Social History     Tobacco Use    Smoking status: Some Days     Current packs/day: 0.00     Types: Cigarettes     Last attempt to quit: 10/12/2022     Years since quittin.6    Smokeless tobacco: Never   Vaping Use    Vaping status: Never Used   Substance and Sexual Activity    Alcohol use: Not Currently    Drug use: Never    Sexual activity: Not Currently     CURRENT MEDICATIONS  Home Medications    **Home medications have not yet been reviewed for this encounter**       Audit from Redirected Encounters    **Home medications have not yet been reviewed for this encounter**       ALLERGIES  Allergies[1]    PHYSICAL EXAM  VITAL SIGNS: /55   Pulse (!) 57   Temp 36.1 °C (97 °F) (Temporal)   Resp 18   Ht 1.626 m (5' 4\")   Wt 124 kg (274 lb)   LMP  (LMP Unknown)   SpO2 98%   BMI 47.03 kg/m²    Genl: F sitting in gurney comfortably, speaking clearly, appears in no acute distress   Head: NC/AT   ENT: Mucous membranes moist, posterior pharynx erythematous, left-sided exudates only, no anatomical shift, sublingual tissues are soft, uvula midline, nares patent bilaterally   Eyes: Normal sclera, pupils equal round reactive to light  Neck: Supple, FROM, no LAD appreciated   Pulmonary: Lungs are clear bilaterally, somewhat diminished globally secondary to body habitus.  Chest: No TTP  CV: Bradycardia, no murmur appreciated, pulses 2+ in both upper and lower extremities,  Abdomen: soft, epigastric discomfort, no true Mullins sign, no McBurney's point tenderness. ND; no rebound/guarding, no masses palpated, no " HSM   : no CVA or suprapubic tenderness   Musculoskeletal: Pain free ROM of the neck. Moving upper and lower extremities in spontaneous and coordinated fashion  Neuro: A&Ox4 (person, place, time, situation), speech fluent, gait steady, no focal deficits appreciated, No cerebellar signs. Sensation is grossly intact in the distal upper and lower extremities.  5/5 strength in  and dorsiflexion/plantar flexion of the ankles  Psych: Patient has an appropriate affect and behavior  Skin: No rash or lesions.  No pallor or jaundice.  No cyanosis.  Warm and dry.     EKG/LABS  Labs Reviewed   CBC WITH DIFFERENTIAL - Abnormal; Notable for the following components:       Result Value    MCH 26.9 (*)     MCHC 30.5 (*)     All other components within normal limits   COMP METABOLIC PANEL - Abnormal; Notable for the following components:    Glucose 104 (*)     Alkaline Phosphatase 133 (*)     All other components within normal limits   ESTIMATED GFR - Abnormal; Notable for the following components:    GFR (CKD-EPI) 57 (*)     All other components within normal limits   LIPASE   GROUP A STREP BY PCR   POCT COV-2, FLU A/B, RSV BY PCR   POC COV-2, FLU A/B, RSV BY PCR     RADIOLOGY/PROCEDURES   I have independently interpreted the diagnostic imaging associated with this visit and am waiting the final reading from the radiologist.   My preliminary interpretation is as follows: No focal infiltrates, no effusions, stable cardiomegaly    Radiologist interpretation:  DX-CHEST-PORTABLE (1 VIEW)   Final Result         1.  No acute cardiopulmonary disease.   2.  Cardiomegaly          COURSE & MEDICAL DECISION MAKING    ASSESSMENT, COURSE AND PLAN  Care Narrative: Patient presents the emergency room for symptoms as described above.  The patient is alert, presents with cough congestion with recent sick exposures at her group home.  She was afebrile, has a stable and ongoing bradycardia with no hemodynamic instability.  Concern would be for  possible viral etiology, esophagitis, pneumonia or other metabolic derangement.  IV access was established and diagnostic testing for this above differential considered.  She has no leukocytosis or concerning anemia, she has no gross electrolyte abnormalities, LFT changes or signs of obstructive hepatobiliary process or identifiable upper abdominal etiology.  Group A strep is negative, viral panel for flu A/B/RSV and COVID is also negative.  She was given GI cocktail and was feeling improved.  She is tapered off of oxygen and at this time shows no evidence of acute hypoxia, no tachypnea.  While she has a history of COPD it does not appear that she has any evidence of acute concurrent pneumonia and there is no evidence of any developing effusions for pulmonary overload status.  She does not have chest pain or palpitations, there is no overall presenting symptomology that would be suggestive of acute ACS.  Patient would like to go home, plans on following up with her outpatient provider.    Patient likely has viral etiology, can be treated with Tylenol, NSAIDs, Tessalon.    DISPOSITION AND DISCUSSIONS  I have discussed management of the patient with the following physicians and JAY's:  none    Discussion of management with other QHP or appropriate source(s): none     Escalation of care considered, and ultimately not performed:IV fluids and acute inpatient care management, however at this time, the patient is most appropriate for outpatient management    Barriers to care at this time, including but not limited to: none.     Decision tools and prescription drugs considered including, but not limited to: zofran/tessalon    FINAL DIAGNOSIS  1. Upper respiratory tract infection, unspecified type    2. Acute cough    3. Viral syndrome    4. Nausea      Electronically signed by: Geronimo Spivey M.D., 6/16/2025 1:38 AM       [1]   Allergies  Allergen Reactions    Amoxicillin Rash and Itching     Tolerates cephalosporins, pt  reports that she gets a rash all over her body and gets itchy    Penicillin G Rash and Itching     pt reports that she gets a rash all over her body and gets itchy

## 2025-06-17 ENCOUNTER — TELEPHONE (OUTPATIENT)
Dept: CARDIOLOGY | Facility: MEDICAL CENTER | Age: 50
End: 2025-06-17
Payer: MEDICAID

## 2025-06-17 NOTE — TELEPHONE ENCOUNTER
Have we ever prescribed this med? Yes.  If yes, what date? 05/30/24    Last OV: 12/06/24 with Maureen SHRESTHA     Next OV: No Pending appt.     DX:  Asthma-COPD overlap syndrome      Medications:   Requested Prescriptions     Pending Prescriptions Disp Refills    montelukast (SINGULAIR) 10 MG Tab [Pharmacy Med Name: MONTELUKAST SOD 10 MG TABLET] 30 Tablet 0     Sig: TAKE 1 TABLET BY MOUTH ONCE DAILY

## 2025-06-17 NOTE — TELEPHONE ENCOUNTER
VR: Please see remote transmission and advise. Pt also currently sick with possible virus.    ========================    Phone No. Called: [phone]    Call outcome: Spoke with Lorene    Discussion / Message:  Patient states she was really tired yesterday felt ,weak. We discussed her having a respiratory infection and how that may be correlated to her event. She still feels weak today. Patient never felt her heart yesterday, but did feel it today on and off.. She states she saw her PCP yesterday and was advise to rest. I advise her that I would let her know if there is any recommendations the cardiology team has for her and advised her to continue to follow recommendations from her PCP and ER doctor.

## 2025-06-17 NOTE — TELEPHONE ENCOUNTER
Patients device transmitted via home monitor 6/17/2025--patient had AF episode lasting 1.5 hours 6/16. Tachy episode within same episode with -160 bpm. ?? Of patient in AF with presenting.     Scanned for review.

## 2025-06-18 RX ORDER — MONTELUKAST SODIUM 10 MG/1
10 TABLET ORAL DAILY
Qty: 30 TABLET | Refills: 0 | Status: SHIPPED | OUTPATIENT
Start: 2025-06-18

## 2025-06-20 NOTE — TELEPHONE ENCOUNTER
Caller: Lorene Haro     Topic/issue: Patient is returning your phone call. Patient advised she has not been given any medication for her illness and would like a call back to discuss.    Callback Number: [phone]     Thank you,  Blaise GARZA

## 2025-06-20 NOTE — TELEPHONE ENCOUNTER
Returned call to patient, reports she is not taking any medication, discussed the benzonatate that was prescribed, patient patient reports she does not have a cough and is not taking the medication as prescribed. Patient reports she was spitting up phlegm, advised on reason the benzonatate being prescribed, patient continued to state she will not take as she does not have a cough, she is only tired. Advised as her sickness appears to be viral in nature would recommend fluids and rest and taking the benzonatate for cough if needed. Patient unsure if fever is present as she does not have a thermometer, has pulse oximeter, has not been using it, obtained a reading during phone call 94% 61HR.    Will follow up with patient to determine how symptoms are progressing.

## 2025-06-20 NOTE — TELEPHONE ENCOUNTER
Phone Number Called: [phone]    Call outcome: Left detailed message for patient. Informed to call back with any additional questions.    Message: Called to inform patient to update office once completing medication for viral illness to determine if feeling better.    Oz Borrego M.D. to Avani     6/19/25  4:51 PM  Ok lets see if improves following resolution of viral illness. Thank you!

## 2025-06-21 ENCOUNTER — APPOINTMENT (OUTPATIENT)
Dept: RADIOLOGY | Facility: MEDICAL CENTER | Age: 50
End: 2025-06-21
Attending: EMERGENCY MEDICINE
Payer: MEDICAID

## 2025-06-21 ENCOUNTER — NON-PROVIDER VISIT (OUTPATIENT)
Dept: CARDIOLOGY | Facility: MEDICAL CENTER | Age: 50
End: 2025-06-21
Payer: MEDICAID

## 2025-06-21 ENCOUNTER — APPOINTMENT (OUTPATIENT)
Dept: RADIOLOGY | Facility: MEDICAL CENTER | Age: 50
End: 2025-06-21
Payer: MEDICAID

## 2025-06-21 ENCOUNTER — HOSPITAL ENCOUNTER (EMERGENCY)
Facility: MEDICAL CENTER | Age: 50
End: 2025-06-21
Attending: EMERGENCY MEDICINE
Payer: MEDICAID

## 2025-06-21 VITALS
TEMPERATURE: 98.4 F | OXYGEN SATURATION: 95 % | DIASTOLIC BLOOD PRESSURE: 64 MMHG | HEIGHT: 64 IN | BODY MASS INDEX: 46.67 KG/M2 | WEIGHT: 273.37 LBS | RESPIRATION RATE: 18 BRPM | HEART RATE: 68 BPM | SYSTOLIC BLOOD PRESSURE: 110 MMHG

## 2025-06-21 DIAGNOSIS — R06.02 SHORTNESS OF BREATH: Primary | ICD-10-CM

## 2025-06-21 DIAGNOSIS — E66.813 CLASS 3 SEVERE OBESITY IN ADULT, UNSPECIFIED BMI, UNSPECIFIED OBESITY TYPE, UNSPECIFIED WHETHER SERIOUS COMORBIDITY PRESENT: ICD-10-CM

## 2025-06-21 LAB
ALBUMIN SERPL BCP-MCNC: 4.2 G/DL (ref 3.2–4.9)
ALBUMIN/GLOB SERPL: 1.4 G/DL
ALP SERPL-CCNC: 143 U/L (ref 30–99)
ALT SERPL-CCNC: 16 U/L (ref 2–50)
ANION GAP SERPL CALC-SCNC: 11 MMOL/L (ref 7–16)
AST SERPL-CCNC: 19 U/L (ref 12–45)
BASOPHILS # BLD AUTO: 0.1 % (ref 0–1.8)
BASOPHILS # BLD: 0.01 K/UL (ref 0–0.12)
BILIRUB SERPL-MCNC: 0.9 MG/DL (ref 0.1–1.5)
BUN SERPL-MCNC: 16 MG/DL (ref 8–22)
CALCIUM ALBUM COR SERPL-MCNC: 9.4 MG/DL (ref 8.5–10.5)
CALCIUM SERPL-MCNC: 9.6 MG/DL (ref 8.5–10.5)
CHLORIDE SERPL-SCNC: 103 MMOL/L (ref 96–112)
CO2 SERPL-SCNC: 28 MMOL/L (ref 20–33)
CREAT SERPL-MCNC: 0.9 MG/DL (ref 0.5–1.4)
EKG IMPRESSION: NORMAL
EOSINOPHIL # BLD AUTO: 0.16 K/UL (ref 0–0.51)
EOSINOPHIL NFR BLD: 1.4 % (ref 0–6.9)
ERYTHROCYTE [DISTWIDTH] IN BLOOD BY AUTOMATED COUNT: 47.9 FL (ref 35.9–50)
GFR SERPLBLD CREATININE-BSD FMLA CKD-EPI: 77 ML/MIN/1.73 M 2
GLOBULIN SER CALC-MCNC: 3.1 G/DL (ref 1.9–3.5)
GLUCOSE SERPL-MCNC: 100 MG/DL (ref 65–99)
HCT VFR BLD AUTO: 39.2 % (ref 37–47)
HGB BLD-MCNC: 11.9 G/DL (ref 12–16)
IMM GRANULOCYTES # BLD AUTO: 0.04 K/UL (ref 0–0.11)
IMM GRANULOCYTES NFR BLD AUTO: 0.3 % (ref 0–0.9)
LYMPHOCYTES # BLD AUTO: 1.89 K/UL (ref 1–4.8)
LYMPHOCYTES NFR BLD: 16.4 % (ref 22–41)
MCH RBC QN AUTO: 26.9 PG (ref 27–33)
MCHC RBC AUTO-ENTMCNC: 30.4 G/DL (ref 32.2–35.5)
MCV RBC AUTO: 88.5 FL (ref 81.4–97.8)
MONOCYTES # BLD AUTO: 0.72 K/UL (ref 0–0.85)
MONOCYTES NFR BLD AUTO: 6.2 % (ref 0–13.4)
NEUTROPHILS # BLD AUTO: 8.72 K/UL (ref 1.82–7.42)
NEUTROPHILS NFR BLD: 75.6 % (ref 44–72)
NRBC # BLD AUTO: 0 K/UL
NRBC BLD-RTO: 0 /100 WBC (ref 0–0.2)
NT-PROBNP SERPL IA-MCNC: 388 PG/ML (ref 0–125)
PLATELET # BLD AUTO: 241 K/UL (ref 164–446)
PMV BLD AUTO: 11.2 FL (ref 9–12.9)
POTASSIUM SERPL-SCNC: 4.5 MMOL/L (ref 3.6–5.5)
PROT SERPL-MCNC: 7.3 G/DL (ref 6–8.2)
RBC # BLD AUTO: 4.43 M/UL (ref 4.2–5.4)
SODIUM SERPL-SCNC: 142 MMOL/L (ref 135–145)
TROPONIN T SERPL-MCNC: 32 NG/L (ref 6–19)
WBC # BLD AUTO: 11.5 K/UL (ref 4.8–10.8)

## 2025-06-21 PROCEDURE — 71045 X-RAY EXAM CHEST 1 VIEW: CPT

## 2025-06-21 PROCEDURE — 36415 COLL VENOUS BLD VENIPUNCTURE: CPT

## 2025-06-21 PROCEDURE — 84484 ASSAY OF TROPONIN QUANT: CPT

## 2025-06-21 PROCEDURE — 99284 EMERGENCY DEPT VISIT MOD MDM: CPT

## 2025-06-21 PROCEDURE — 85025 COMPLETE CBC W/AUTO DIFF WBC: CPT

## 2025-06-21 PROCEDURE — 83880 ASSAY OF NATRIURETIC PEPTIDE: CPT

## 2025-06-21 PROCEDURE — 93005 ELECTROCARDIOGRAM TRACING: CPT | Mod: TC | Performed by: EMERGENCY MEDICINE

## 2025-06-21 PROCEDURE — 80053 COMPREHEN METABOLIC PANEL: CPT

## 2025-06-21 PROCEDURE — 93005 ELECTROCARDIOGRAM TRACING: CPT | Mod: TC

## 2025-06-21 ASSESSMENT — FIBROSIS 4 INDEX: FIB4 SCORE: .7423074889580902687

## 2025-06-22 NOTE — ED NOTES
"Pt stating \"I can't go home my oxygen level is low\". Pt informed that she is saturating 93-95% on room air. Pt states \"I can't walk what do you expect me to do\". This RN asked pt how she gets around at home. Pt states she uses her walker at home. Pt able to ambulate here using walker. Pt given d/c instructions with no further questions. Pt ambulatory out of ER with ED tech with all belongings and a steady gait while stating \"You guys don't care about me\" and yelling \"I'm a fall risk!\".   "

## 2025-06-22 NOTE — ED TRIAGE NOTES
"Chief Complaint   Patient presents with    Shortness of Breath     Pt BIB [facility] from group home in [place]. Pt has history of KATHIA normally wheres CPAP at night. Pt called EMS because after coming home from a friend's she was feeling short of breath. On EMS arrival pt satting 89% on RA, placed on 2L NC. Pt arrives still feeling short of breath 87% on RA on arrival, placed on 2L satting 93%.        BIB EMS to red 2, pt on monitor and in gown, labs drawn and sent.     /59   Pulse 66   Temp 36.9 °C (98.4 °F) (Temporal)   Resp 19   Ht 1.626 m (5' 4\")   Wt 124 kg (273 lb 5.9 oz)   LMP  (LMP Unknown)   SpO2 96%   BMI 46.92 kg/m²     "

## 2025-06-22 NOTE — ED PROVIDER NOTES
ED Provider Note    Scribed for Nader Wilson by Sheree Stafford. 6/21/2025  7:46 PM    Primary care provider: Lili Reddy M.D.  Means of arrival: EMS  History obtained from: Patient  History limited by: None    CHIEF COMPLAINT  Chief Complaint   Patient presents with    Shortness of Breath     Pt TESS WHITE from group home in [place]. Pt has history of KATHIA normally wheres CPAP at night. Pt called EMS because after coming home from a friend's she was feeling short of breath. On EMS arrival pt satting 89% on RA, placed on 2L NC. Pt arrives still feeling short of breath 87% on RA on arrival, placed on 2L satting 93%.      EXTERNAL RECORDS REVIEWED  Outpatient Notes Patient seen at Cardiology 5/20/2025 for follow up of cardiac monitor and appropriate function was demonstrated at this time, no acute abnormalities reported.     HPI/ROS  LIMITATION TO HISTORY   Select: : None  OUTSIDE HISTORIAN(S):  EMS reports additional information as seen below.    HPI  Lorene Haro is a 49 y.o. female who presents to the Emergency Department via EMS for shortness of breath onset prior to arrival. Patient reports a history of COPD and normally wears CPAP machine at home to sleep. Patient reports that on her way home from a friend's house she started to feel short of breath. EMS reports that the patient was 89% on room air and placed on 2 L NC. She further describes having diarrhea for the past few days. She reports also feeling of general malaise throughout the day. She reports nausea but denies vomiting. She denies dysuria, fevers, or chills. There are no known alleviating or exacerbating factors.      REVIEW OF SYSTEMS  As above, all other systems reviewed and are negative.   See HPI for further details.     PAST MEDICAL HISTORY   has a past medical history of A-fib (HCC), MONI (acute kidney injury) (HCC) (08/09/2023), Asthma, Bipolar 2 disorder (HCC), Chickenpox, Chronic obstructive pulmonary disease (HCC), Congestive  heart failure (HCC), Hypertension, On home oxygen therapy, Other psychological stress, Schizophrenic disorder (HCC), and Yeast infection of the skin (04/01/2021).  SURGICAL HISTORY   has a past surgical history that includes hysterectomy laparoscopy; colectomy; cholecystectomy; knee arthroscopy (Left); and orif, ankle (Left, 11/2/2024).  SOCIAL HISTORY  Social History[1]   Social History     Substance and Sexual Activity   Drug Use Never     FAMILY HISTORY  Family History   Problem Relation Age of Onset    No Known Problems Mother     Cancer Sister      CURRENT MEDICATIONS  Home Medications       Reviewed by Luisa Barbosa R.N. (Registered Nurse) on 06/21/25 at 1935  Med List Status: Not Addressed     Medication Last Dose Status   albuterol 108 (90 Base) MCG/ACT Aero Soln inhalation aerosol  Active   aripiprazole (ABILIFY) 30 MG tablet  Active   ascorbic acid (VITAMIN C) 500 MG tablet  Active   atorvastatin (LIPITOR) 20 MG Tab  Active   benzonatate (TESSALON) 200 MG capsule  Active   cyclobenzaprine (FLEXERIL) 10 mg Tab  Active   ELIQUIS 5 MG Tab  Active   ferrous sulfate 325 (65 Fe) MG tablet  Active   fluticasone (FLONASE) 50 MCG/ACT nasal spray  Active   fluticasone-salmeterol (ADVAIR DISKUS) 100-50 MCG/ACT AEROSOL POWDER, BREATH ACTIVATED  Active   gabapentin (NEURONTIN) 300 MG Cap  Active   hydrOXYzine HCl (ATARAX) 25 MG Tab  Active   ketoconazole (NIZORAL) 2 % Cream  Active   lidocaine (LIDODERM) 5 % Patch  Active   metoprolol SR (TOPROL XL) 50 MG TABLET SR 24 HR  Active   montelukast (SINGULAIR) 10 MG Tab  Active   ondansetron (ZOFRAN ODT) 4 MG TABLET DISPERSIBLE  Active   Vitamin D, Cholecalciferol, (CHOLECALCIFEROL) 25 MCG (1000 UT) Tab  Active                  ALLERGIES  Allergies[2]    PHYSICAL EXAM    VITAL SIGNS:   Vitals:    06/21/25 1935 06/21/25 2024 06/21/25 2028 06/21/25 2029   BP:    110/64   Pulse:  63 68    Resp:  20  18   Temp:       TempSrc:       SpO2: 96% 91% 95%    Weight:       Height:            Vitals: My interpretation: normotensive, not tachycardic, afebrile, not hypoxic    Reinterpretation of vitals: Unchanged and unremarkable.      Cardiac Monitor Interpretation: The cardiac monitor revealed normal Sinus Rhythm as interpreted by me. The cardiac monitor was ordered secondary to the patient's history and to monitor for dysrhythmia and/or tachycardia.    PE:   Gen: sitting comfortably, speaking clearly, appears in no acute distress   ENT: Mucous membranes moist, posterior pharynx clear, uvula midline, nares patent bilaterally   Neck: Supple, FROM  Pulmonary: Lungs are clear to auscultation bilaterally. No tachypnea  CV:  RRR, no murmur appreciated, pulses 2+ in both upper and lower extremities  Abdomen: soft, NT/ND; no rebound/guarding  : no CVA or suprapubic tenderness   Neuro: A&Ox4 (person, place, time, situation), speech fluent, gait steady, no focal deficits appreciated  Skin: No rash or lesions.  No pallor or jaundice.  No cyanosis.  Warm and dry.     DIAGNOSTIC STUDIES / PROCEDURES    LABS  Results for orders placed or performed during the hospital encounter of 06/21/25   CBC with Differential    Collection Time: 06/21/25  7:45 PM   Result Value Ref Range    WBC 11.5 (H) 4.8 - 10.8 K/uL    RBC 4.43 4.20 - 5.40 M/uL    Hemoglobin 11.9 (L) 12.0 - 16.0 g/dL    Hematocrit 39.2 37.0 - 47.0 %    MCV 88.5 81.4 - 97.8 fL    MCH 26.9 (L) 27.0 - 33.0 pg    MCHC 30.4 (L) 32.2 - 35.5 g/dL    RDW 47.9 35.9 - 50.0 fL    Platelet Count 241 164 - 446 K/uL    MPV 11.2 9.0 - 12.9 fL    Neutrophils-Polys 75.60 (H) 44.00 - 72.00 %    Lymphocytes 16.40 (L) 22.00 - 41.00 %    Monocytes 6.20 0.00 - 13.40 %    Eosinophils 1.40 0.00 - 6.90 %    Basophils 0.10 0.00 - 1.80 %    Immature Granulocytes 0.30 0.00 - 0.90 %    Nucleated RBC 0.00 0.00 - 0.20 /100 WBC    Neutrophils (Absolute) 8.72 (H) 1.82 - 7.42 K/uL    Lymphs (Absolute) 1.89 1.00 - 4.80 K/uL    Monos (Absolute) 0.72 0.00 - 0.85 K/uL    Eos (Absolute) 0.16  0.00 - 0.51 K/uL    Baso (Absolute) 0.01 0.00 - 0.12 K/uL    Immature Granulocytes (abs) 0.04 0.00 - 0.11 K/uL    NRBC (Absolute) 0.00 K/uL   Comp Metabolic Panel    Collection Time: 25  7:45 PM   Result Value Ref Range    Sodium 142 135 - 145 mmol/L    Potassium 4.5 3.6 - 5.5 mmol/L    Chloride 103 96 - 112 mmol/L    Co2 28 20 - 33 mmol/L    Anion Gap 11.0 7.0 - 16.0    Glucose 100 (H) 65 - 99 mg/dL    Bun 16 8 - 22 mg/dL    Creatinine 0.90 0.50 - 1.40 mg/dL    Calcium 9.6 8.5 - 10.5 mg/dL    Correct Calcium 9.4 8.5 - 10.5 mg/dL    AST(SGOT) 19 12 - 45 U/L    ALT(SGPT) 16 2 - 50 U/L    Alkaline Phosphatase 143 (H) 30 - 99 U/L    Total Bilirubin 0.9 0.1 - 1.5 mg/dL    Albumin 4.2 3.2 - 4.9 g/dL    Total Protein 7.3 6.0 - 8.2 g/dL    Globulin 3.1 1.9 - 3.5 g/dL    A-G Ratio 1.4 g/dL   proBrain Natriuretic Peptide, NT    Collection Time: 25  7:45 PM   Result Value Ref Range    NT-proBNP 388 (H) 0 - 125 pg/mL   Troponin    Collection Time: 25  7:45 PM   Result Value Ref Range    Troponin T 32 (H) 6 - 19 ng/L   ESTIMATED GFR    Collection Time: 25  7:45 PM   Result Value Ref Range    GFR (CKD-EPI) 77 >60 mL/min/1.73 m 2   EKG    Collection Time: 25  7:47 PM   Result Value Ref Range    Report       [facility]    Test Date:  2025  Pt Name:    ADELINA MILLAN                Department: ER  MRN:        [id number]                      Room:       RD 02  Gender:     Female                       Technician: [id number]  :        1975                   Requested By:ER TRIAGE PROTOCOL  Order #:    [id number]                    Reading MD: Nader Wilson    Measurements  Intervals                                Axis  Rate:       66                           P:          8  NJ:         118                          QRS:        52  QRSD:       129                          T:          7  QT:         405  QTc:        425    Interpretive Statements  Sinus  rhythm  Supraventricular bigeminy  Borderline short SC interval  Right bundle branch block  ST elev, probable normal early repol pattern  Compared to ECG 05/31/2025 16:58:58  Atrial premature complex(es) now present  Incomplete right bundle-branch block no longer present  ST (T wave) deviation still pre sent  Electronically Signed On 06- 19:47:22 PDT by Nader Wilson       *Note: Due to a large number of results and/or encounters for the requested time period, some results have not been displayed. A complete set of results can be found in Results Review.      All labs reviewed by me. Labs were compared to prior labs if they were available. Significant for no significant leukocytosis, no anemia, normal electrolytes, normal glucose, normal renal function, normal liver enzymes, no bilirubin, BNP negative, troponin at baseline.    RADIOLOGY  I have independently interpreted the diagnostic imaging associated with this visit and am waiting the final reading from the radiologist.   My preliminary interpretation is a follows: Cardiomegaly, no signs of consolidative process  Radiologist interpretation is as follows:  DX-CHEST-PORTABLE (1 VIEW)   Final Result      Cardiomegaly with possible mild pulmonary edema.        COURSE & MEDICAL DECISION MAKING  Nursing notes, VS, PMSFHx, labs, imaging, EKG reviewed in chart.    Heart Score: Moderate    ED Observation Status? No; Patient does not meet criteria for ED Observation.     Ddx: PE, MI, pneumonia, URI, flu, COVID, hypoventilatory syndrome secondary to obesity    MDM: 7:46 PM Lorene Haro is a 49 y.o. female who presented with evaluation for chronic symptoms of feeling short of breath.  Patient is very well-known to the department and has been seen frequently here.  She has morbid obesity and multiple chronic issues.  Today she was feeling short of breath and her oxygen level was 89% which is likely chronic for her and she was brought here for evaluation by EMS  who provided helpful collateral formation.  Patient has no chest pain and asymptomatic otherwise.  She was just evaluated here recently for URI-like symptoms on the 16th with a negative workup at that time and discharged.  Here her vital signs are normal.  She is not requiring supplemental oxygen.  She is not hypoxic.  Her physical exam seems to be at baseline and her lungs are clear.  She is morbidly obese.  She had chest x-ray showing cardiomegaly on my independent interpretation and radiologist comments there is cardiomegaly with possible mild pulmonary edema however patient's BNP is normal which is encouraging.  EKG shows sinus rhythm without ischemic changes. All labs reviewed by me. Labs were compared to prior labs if they were available. Significant for no significant leukocytosis, no anemia, normal electrolytes, normal glucose, normal renal function, normal liver enzymes, no bilirubin, BNP negative, troponin at baseline.  Overall patient feels reassured by negative workup here and workup does seem to be at baseline for the patient.  She verbalized understanding return precautions and close outpatient follow-up as well as compliance with her CPAP machine which she is supposed to be wearing.  Return precautions verbalized to be understood and patient is amenable.    ADDITIONAL PROBLEM LIST AND DISPOSITION    I have discussed management of the patient with the following physicians and JAY's:  None    Discussion of management with other QHP or appropriate source(s): None     Escalation of care considered, and ultimately not performed:acute inpatient care management, however at this time, the patient is most appropriate for outpatient management    Barriers to care at this time, including but not limited to: None.     Decision tools and prescription drugs considered including, but not limited to: Heart Score.    FINAL IMPRESSION  1. Shortness of breath Acute   2. Class 3 severe obesity in adult, unspecified BMI,  unspecified obesity type, unspecified whether serious comorbidity present Acute      Sheree HARRIS (Scribe), am scribing for, and in the presence of, Nader Wilson.    Electronically signed by: Sheree Stafford (Ade), 2025    INader personally performed the services described in this documentation, as scribed by Sheree Stafford in my presence, and it is both accurate and complete.    The note accurately reflects work and decisions made by me.  Nadre Wilson  2025  9:11 PM         [1]   Social History  Tobacco Use    Smoking status: Some Days     Current packs/day: 0.00     Types: Cigarettes     Last attempt to quit: 10/12/2022     Years since quittin.6    Smokeless tobacco: Never   Vaping Use    Vaping status: Never Used   Substance Use Topics    Alcohol use: Not Currently    Drug use: Never   [2]   Allergies  Allergen Reactions    Amoxicillin Rash and Itching     Tolerates cephalosporins, pt reports that she gets a rash all over her body and gets itchy    Penicillin G Rash and Itching     pt reports that she gets a rash all over her body and gets itchy

## 2025-06-22 NOTE — DISCHARGE INSTRUCTIONS
Please follow-up with your PCP for further evaluation and treatment.  Please make sure to have a CPAP at home to help in future cases of feeling short of breath.  If any worsening symptoms or concerns please return to the ED.  Thank for coming today.

## 2025-06-23 PROCEDURE — 93298 REM INTERROG DEV EVAL SCRMS: CPT | Mod: 26 | Performed by: INTERNAL MEDICINE

## 2025-06-25 ENCOUNTER — TELEPHONE (OUTPATIENT)
Dept: SLEEP MEDICINE | Facility: MEDICAL CENTER | Age: 50
End: 2025-06-25
Payer: MEDICAID

## 2025-06-25 ENCOUNTER — APPOINTMENT (OUTPATIENT)
Dept: SLEEP MEDICINE | Facility: MEDICAL CENTER | Age: 50
End: 2025-06-25
Payer: MEDICAID

## 2025-06-25 ENCOUNTER — PHARMACY VISIT (OUTPATIENT)
Dept: PHARMACY | Facility: MEDICAL CENTER | Age: 50
End: 2025-06-25
Payer: COMMERCIAL

## 2025-06-25 ENCOUNTER — APPOINTMENT (OUTPATIENT)
Dept: RADIOLOGY | Facility: MEDICAL CENTER | Age: 50
End: 2025-06-25
Attending: EMERGENCY MEDICINE
Payer: MEDICAID

## 2025-06-25 ENCOUNTER — HOSPITAL ENCOUNTER (EMERGENCY)
Facility: MEDICAL CENTER | Age: 50
End: 2025-06-25
Attending: EMERGENCY MEDICINE
Payer: MEDICAID

## 2025-06-25 VITALS
HEIGHT: 64 IN | HEART RATE: 57 BPM | TEMPERATURE: 97.4 F | DIASTOLIC BLOOD PRESSURE: 55 MMHG | SYSTOLIC BLOOD PRESSURE: 116 MMHG | OXYGEN SATURATION: 97 % | BODY MASS INDEX: 46.95 KG/M2 | RESPIRATION RATE: 16 BRPM | WEIGHT: 275 LBS

## 2025-06-25 DIAGNOSIS — J44.1 CHRONIC OBSTRUCTIVE PULMONARY DISEASE WITH ACUTE EXACERBATION (HCC): Primary | ICD-10-CM

## 2025-06-25 LAB
ALBUMIN SERPL BCP-MCNC: 4 G/DL (ref 3.2–4.9)
ALBUMIN/GLOB SERPL: 1.3 G/DL
ALP SERPL-CCNC: 134 U/L (ref 30–99)
ALT SERPL-CCNC: 14 U/L (ref 2–50)
ANION GAP SERPL CALC-SCNC: 11 MMOL/L (ref 7–16)
AST SERPL-CCNC: 14 U/L (ref 12–45)
BASOPHILS # BLD AUTO: 0.2 % (ref 0–1.8)
BASOPHILS # BLD: 0.02 K/UL (ref 0–0.12)
BILIRUB SERPL-MCNC: 0.9 MG/DL (ref 0.1–1.5)
BUN SERPL-MCNC: 16 MG/DL (ref 8–22)
CALCIUM ALBUM COR SERPL-MCNC: 9.8 MG/DL (ref 8.5–10.5)
CALCIUM SERPL-MCNC: 9.8 MG/DL (ref 8.5–10.5)
CHLORIDE SERPL-SCNC: 105 MMOL/L (ref 96–112)
CO2 SERPL-SCNC: 27 MMOL/L (ref 20–33)
CREAT SERPL-MCNC: 0.91 MG/DL (ref 0.5–1.4)
EKG IMPRESSION: NORMAL
EOSINOPHIL # BLD AUTO: 0.07 K/UL (ref 0–0.51)
EOSINOPHIL NFR BLD: 0.7 % (ref 0–6.9)
ERYTHROCYTE [DISTWIDTH] IN BLOOD BY AUTOMATED COUNT: 51.5 FL (ref 35.9–50)
GFR SERPLBLD CREATININE-BSD FMLA CKD-EPI: 76 ML/MIN/1.73 M 2
GLOBULIN SER CALC-MCNC: 3.1 G/DL (ref 1.9–3.5)
GLUCOSE SERPL-MCNC: 111 MG/DL (ref 65–99)
HCG SERPL QL: NEGATIVE
HCT VFR BLD AUTO: 37.7 % (ref 37–47)
HGB BLD-MCNC: 11.4 G/DL (ref 12–16)
IMM GRANULOCYTES # BLD AUTO: 0.06 K/UL (ref 0–0.11)
IMM GRANULOCYTES NFR BLD AUTO: 0.6 % (ref 0–0.9)
LYMPHOCYTES # BLD AUTO: 1.52 K/UL (ref 1–4.8)
LYMPHOCYTES NFR BLD: 16.3 % (ref 22–41)
MCH RBC QN AUTO: 27.1 PG (ref 27–33)
MCHC RBC AUTO-ENTMCNC: 30.2 G/DL (ref 32.2–35.5)
MCV RBC AUTO: 89.5 FL (ref 81.4–97.8)
MONOCYTES # BLD AUTO: 0.62 K/UL (ref 0–0.85)
MONOCYTES NFR BLD AUTO: 6.6 % (ref 0–13.4)
NEUTROPHILS # BLD AUTO: 7.05 K/UL (ref 1.82–7.42)
NEUTROPHILS NFR BLD: 75.6 % (ref 44–72)
NRBC # BLD AUTO: 0 K/UL
NRBC BLD-RTO: 0 /100 WBC (ref 0–0.2)
NT-PROBNP SERPL IA-MCNC: 237 PG/ML (ref 0–125)
PLATELET # BLD AUTO: 210 K/UL (ref 164–446)
PMV BLD AUTO: 12.2 FL (ref 9–12.9)
POTASSIUM SERPL-SCNC: 4.6 MMOL/L (ref 3.6–5.5)
PROT SERPL-MCNC: 7.1 G/DL (ref 6–8.2)
RBC # BLD AUTO: 4.21 M/UL (ref 4.2–5.4)
SODIUM SERPL-SCNC: 143 MMOL/L (ref 135–145)
TROPONIN T SERPL-MCNC: 39 NG/L (ref 6–19)
TROPONIN T SERPL-MCNC: 40 NG/L (ref 6–19)
WBC # BLD AUTO: 9.3 K/UL (ref 4.8–10.8)

## 2025-06-25 PROCEDURE — 84703 CHORIONIC GONADOTROPIN ASSAY: CPT

## 2025-06-25 PROCEDURE — 84484 ASSAY OF TROPONIN QUANT: CPT

## 2025-06-25 PROCEDURE — 93005 ELECTROCARDIOGRAM TRACING: CPT | Mod: TC | Performed by: EMERGENCY MEDICINE

## 2025-06-25 PROCEDURE — 71045 X-RAY EXAM CHEST 1 VIEW: CPT

## 2025-06-25 PROCEDURE — RXMED WILLOW AMBULATORY MEDICATION CHARGE: Performed by: EMERGENCY MEDICINE

## 2025-06-25 PROCEDURE — 700101 HCHG RX REV CODE 250: Mod: UD | Performed by: EMERGENCY MEDICINE

## 2025-06-25 PROCEDURE — 36415 COLL VENOUS BLD VENIPUNCTURE: CPT

## 2025-06-25 PROCEDURE — 85025 COMPLETE CBC W/AUTO DIFF WBC: CPT

## 2025-06-25 PROCEDURE — 80053 COMPREHEN METABOLIC PANEL: CPT

## 2025-06-25 PROCEDURE — 94640 AIRWAY INHALATION TREATMENT: CPT

## 2025-06-25 PROCEDURE — 83880 ASSAY OF NATRIURETIC PEPTIDE: CPT

## 2025-06-25 PROCEDURE — 99285 EMERGENCY DEPT VISIT HI MDM: CPT

## 2025-06-25 PROCEDURE — 700111 HCHG RX REV CODE 636 W/ 250 OVERRIDE (IP): Performed by: EMERGENCY MEDICINE

## 2025-06-25 RX ORDER — PREDNISONE 20 MG/1
40 TABLET ORAL ONCE
Status: COMPLETED | OUTPATIENT
Start: 2025-06-25 | End: 2025-06-25

## 2025-06-25 RX ORDER — PREDNISONE 20 MG/1
40 TABLET ORAL DAILY
Qty: 8 TABLET | Refills: 0 | Status: SHIPPED | OUTPATIENT
Start: 2025-06-26 | End: 2025-06-30

## 2025-06-25 RX ORDER — IPRATROPIUM BROMIDE AND ALBUTEROL SULFATE 2.5; .5 MG/3ML; MG/3ML
3 SOLUTION RESPIRATORY (INHALATION) ONCE
Status: COMPLETED | OUTPATIENT
Start: 2025-06-25 | End: 2025-06-25

## 2025-06-25 RX ADMIN — IPRATROPIUM BROMIDE AND ALBUTEROL SULFATE 3 ML: .5; 2.5 SOLUTION RESPIRATORY (INHALATION) at 11:00

## 2025-06-25 RX ADMIN — PREDNISONE 40 MG: 20 TABLET ORAL at 13:07

## 2025-06-25 ASSESSMENT — FIBROSIS 4 INDEX: FIB4 SCORE: 1.01

## 2025-06-25 NOTE — ED NOTES
2nd trop sent to lab  
All pt needs addressed at this time  
ERP at bedside  
MTM called on pt behalf; group home address verified with patient. MTM to  patient in 20 minutes  Trip ID: [id number]  
Meds to bed provided  
Pt medicated per MAR; food provided  
Pt repositioned  
Pt resting in [facility]  
RT paged for neb tx administration   
toileting/walking/standing

## 2025-06-25 NOTE — ED TRIAGE NOTES
"Chief Complaint   Patient presents with    Shortness of Breath     BIB EMS from [facility] in [facility] for SOB and chest pain x2 days. Pt administered her own inhaler prior to EMS arrival. Pt arrives on 2L NC    Chest Pain     Sub sternal, dull       50 yo female to red 01 for above complaint.     Pt is alert and oriented, speaking in full sentences, follows commands and responds appropriately to questions.     /56   Pulse (!) 54   Temp 36.3 °C (97.4 °F) (Temporal)   Resp 16   Ht 1.626 m (5' 4\")   Wt 125 kg (275 lb)   LMP  (LMP Unknown)   SpO2 91%   BMI 47.20 kg/m²     "

## 2025-06-25 NOTE — TELEPHONE ENCOUNTER
Called giancarlo regarding pt message and giancarlo forward me a document from [facility] of health and human services. Per pt insurance. The full document is scan into media. I asked pt if she is still having trouble breathing and pt stated yes but she was on her way to hospital to be seen. I did advise the pt to go to ER for this concern.

## 2025-06-25 NOTE — TELEPHONE ENCOUNTER
I called patient to reschedule her appointment with Arabella today, patient stated she hasn't received her cpap machine, can you please follow up with DME company and contact patient.    Also patient she is having trouble breathing, advised patient to go to UC/ER, pt doesn't have a ride, advised to call Mariela.

## 2025-06-25 NOTE — ED PROVIDER NOTES
ED PHYSICIAN NOTE    CHIEF COMPLAINT  Chief Complaint   Patient presents with    Shortness of Breath     BIB EMS from [facility] in [facility] for SOB and chest pain x2 days. Pt administered her own inhaler prior to EMS arrival. Pt arrives on 2L NC    Chest Pain     Sub sternal, dull       EXTERNAL RECORDS REVIEWED  Outpatient Notes telephone conversation with pulmonary medicine.  Patient's been having difficulty breathing was advised to come to the ER.    Patient was in the emergency department on 6/21 with shortness of breath.  ERP reassured with evaluation.  Patient was discharged for outpatient follow-up    HPI/ROS      Lorenesamina Haro is a 49 y.o. female who presents with shortness of breath.  Reports it started a few days ago.  She has tried using her inhaler without relief.  Minimal cough.  No fevers or chills.  Hurts when she coughs otherwise no chest pain.  No abdominal pain.  No leg swelling leg pain travel immobilization surgery.  No orthopnea PND.  She uses oxygen at night.  She is supposed to be using a CPAP but does not have it.    PAST MEDICAL HISTORY  Past Medical History[1]    SOCIAL HISTORY  Social History[2]    CURRENT MEDICATIONS  Home Medications       Reviewed by Ashley Carlos R.N. (Registered Nurse) on 06/25/25 at 0918  Med List Status: <None>     Medication Last Dose Status   albuterol 108 (90 Base) MCG/ACT Aero Soln inhalation aerosol  Active   aripiprazole (ABILIFY) 30 MG tablet  Active   ascorbic acid (VITAMIN C) 500 MG tablet  Active   atorvastatin (LIPITOR) 20 MG Tab  Active   benzonatate (TESSALON) 200 MG capsule  Active   cyclobenzaprine (FLEXERIL) 10 mg Tab  Active   ELIQUIS 5 MG Tab  Active   ferrous sulfate 325 (65 Fe) MG tablet  Active   fluticasone (FLONASE) 50 MCG/ACT nasal spray  Active   fluticasone-salmeterol (ADVAIR DISKUS) 100-50 MCG/ACT AEROSOL POWDER, BREATH ACTIVATED  Active   gabapentin (NEURONTIN) 300 MG Cap  Active   hydrOXYzine HCl (ATARAX) 25 MG Tab  Active  "  ketoconazole (NIZORAL) 2 % Cream  Active   lidocaine (LIDODERM) 5 % Patch  Active   metoprolol SR (TOPROL XL) 50 MG TABLET SR 24 HR  Active   montelukast (SINGULAIR) 10 MG Tab  Active   ondansetron (ZOFRAN ODT) 4 MG TABLET DISPERSIBLE  Active   Vitamin D, Cholecalciferol, (CHOLECALCIFEROL) 25 MCG (1000 UT) Tab  Active                  Audit from Redirected Encounters    **Home medications have not yet been reviewed for this encounter**         ALLERGIES  Allergies[3]    PHYSICAL EXAM  VITAL SIGNS: /55   Pulse (!) 57   Temp 36.3 °C (97.4 °F) (Temporal)   Resp 16   Ht 1.626 m (5' 4\")   Wt 125 kg (275 lb)   LMP  (LMP Unknown)   SpO2 97%   BMI 47.20 kg/m²    Constitutional: Awake and alert  HENT: Normal inspection  Eyes: Normal inspection  Neck: Grossly normal range of motion.  Cardiovascular: Normal heart rate, Normal rhythm.  Symmetric peripheral pulses.   Thorax & Lungs: No respiratory distress, minimal diffuse wheezing.  No crackles or rhonchi  Abdomen: Bowel sounds normal, soft, non-distended, nontender, no mass  Skin: No rash.  Back: No tenderness, No CVA tenderness.   Extremities: No clubbing, cyanosis, edema, no Homans or cords.  Neurologic: Grossly normal   Psychiatric: Flat affect    DIAGNOSTIC STUDIES / PROCEDURES  LABS/EKG  Results for orders placed or performed during the hospital encounter of 06/25/25   CBC with Differential    Collection Time: 06/25/25  9:28 AM   Result Value Ref Range    WBC 9.3 4.8 - 10.8 K/uL    RBC 4.21 4.20 - 5.40 M/uL    Hemoglobin 11.4 (L) 12.0 - 16.0 g/dL    Hematocrit 37.7 37.0 - 47.0 %    MCV 89.5 81.4 - 97.8 fL    MCH 27.1 27.0 - 33.0 pg    MCHC 30.2 (L) 32.2 - 35.5 g/dL    RDW 51.5 (H) 35.9 - 50.0 fL    Platelet Count 210 164 - 446 K/uL    MPV 12.2 9.0 - 12.9 fL    Neutrophils-Polys 75.60 (H) 44.00 - 72.00 %    Lymphocytes 16.30 (L) 22.00 - 41.00 %    Monocytes 6.60 0.00 - 13.40 %    Eosinophils 0.70 0.00 - 6.90 %    Basophils 0.20 0.00 - 1.80 %    Immature " Granulocytes 0.60 0.00 - 0.90 %    Nucleated RBC 0.00 0.00 - 0.20 /100 WBC    Neutrophils (Absolute) 7.05 1.82 - 7.42 K/uL    Lymphs (Absolute) 1.52 1.00 - 4.80 K/uL    Monos (Absolute) 0.62 0.00 - 0.85 K/uL    Eos (Absolute) 0.07 0.00 - 0.51 K/uL    Baso (Absolute) 0.02 0.00 - 0.12 K/uL    Immature Granulocytes (abs) 0.06 0.00 - 0.11 K/uL    NRBC (Absolute) 0.00 K/uL   Complete Metabolic Panel (CMP)    Collection Time: 06/25/25  9:28 AM   Result Value Ref Range    Sodium 143 135 - 145 mmol/L    Potassium 4.6 3.6 - 5.5 mmol/L    Chloride 105 96 - 112 mmol/L    Co2 27 20 - 33 mmol/L    Anion Gap 11.0 7.0 - 16.0    Glucose 111 (H) 65 - 99 mg/dL    Bun 16 8 - 22 mg/dL    Creatinine 0.91 0.50 - 1.40 mg/dL    Calcium 9.8 8.5 - 10.5 mg/dL    Correct Calcium 9.8 8.5 - 10.5 mg/dL    AST(SGOT) 14 12 - 45 U/L    ALT(SGPT) 14 2 - 50 U/L    Alkaline Phosphatase 134 (H) 30 - 99 U/L    Total Bilirubin 0.9 0.1 - 1.5 mg/dL    Albumin 4.0 3.2 - 4.9 g/dL    Total Protein 7.1 6.0 - 8.2 g/dL    Globulin 3.1 1.9 - 3.5 g/dL    A-G Ratio 1.3 g/dL   proBrain Natriuretic Peptide, NT (BNP_    Collection Time: 06/25/25  9:28 AM   Result Value Ref Range    NT-proBNP 237 (H) 0 - 125 pg/mL   Troponins NOW    Collection Time: 06/25/25  9:28 AM   Result Value Ref Range    Troponin T 40 (H) 6 - 19 ng/L   HCG Qual Serum    Collection Time: 06/25/25  9:28 AM   Result Value Ref Range    Beta-Hcg Qualitative Serum Negative Negative   ESTIMATED GFR    Collection Time: 06/25/25  9:28 AM   Result Value Ref Range    GFR (CKD-EPI) 76 >60 mL/min/1.73 m 2   Troponins in two (2) hours    Collection Time: 06/25/25 11:44 AM   Result Value Ref Range    Troponin T 39 (H) 6 - 19 ng/L   EKG    Collection Time: 06/25/25 12:55 PM   Result Value Ref Range    Report       [facility] Emergency Dept.    Test Date:  2025-06-25  Pt Name:    ADELINA MILLAN                Department: ER  MRN:        [id number]                      Room:       [facility]  Gender:      Female                       Technician: [id number]  :        1975                   Requested By:ER TRIAGE PROTOCOL  Order #:    [id number]                    Reading MD: BECKY TIDWELL MD    Measurements  Intervals                                Axis  Rate:       50                           P:          29  NJ:         125                          QRS:        45  QRSD:       126                          T:          2  QT:         420  QTc:        383    Interpretive Statements  Sinus bradycardia  Right bundle branch block  Borderline ST elevation, lateral leads  Compared to ECG 2025 19:39:01  Sinus rhythm no longer present  Atrial premature complex(es) no longer present  ST (T wave) deviation still present  Electronically Signed On 2025 12:55:51 PDT by BECKY TIDWELL MD       *Note: Due to a large number of results and/or encounters for the requested time period, some results have not been displayed. A complete set of results can be found in Results Review.      I have independently interpreted this EKG as documented above    Rhythm strip interpretation-sinus rhythm    RADIOLOGY  I have independently interpreted the diagnostic imaging associated with this visit and am waiting the final reading from the radiologist.   My preliminary interpretation is as follows: Cardiomegaly. no acute disease    DX-CHEST-PORTABLE (1 VIEW)   Final Result      1.  Stable cardiomegaly.   2.  No acute findings.            COURSE & MEDICAL DECISION MAKING    INITIAL ASSESSMENT, COURSE AND PLAN  Case narrative: Patient presents with shortness of breath.  History of COPD.  Tried using her inhaler without relief.  Has minimal bronchospasm on exam.  No respiratory distress tachycardia hypoxia.  Unlikely pulmonary embolism.  Will need to rule out pneumonia.  Unlikely ACS but will obtain EKG and serial troponins.  Patient does not appear volume overloaded on exam ordered x-ray and laboratory data.  Will give  DuoNeb.    Laboratory data as noted.  No leukocytosis.  Slight left shift.  Troponin is elevated, but similar to prior values.  Serial troponin unchanged.  Chemistries normal.  BNP relatively normal and chest x-ray does not show evidence of volume overload    Patient's symptoms were improved with nebulizer.  I suspect COPD exacerbation.  She was given prednisone orally.    Patient is stable with normal vital signs reassuring data.  She is appropriate for outpatient management.  I given a prescription for pulse of prednisone.  Have advised inhaler every 4 hours as needed.  Patient should follow-up with her doctor for recheck within 1 week.  I precaution patient to return to the ER for difficulty breathing, worsening, not improving or concern.      DISPOSITION AND DISCUSSIONS      Escalation of care considered, and ultimately not performed:acute inpatient care management, however at this time, the patient is most appropriate for outpatient management    Prescription drugs considered and/or prescribed:     Prescribed   Current Discharge Medication List        START taking these medications    Details   predniSONE (DELTASONE) 20 MG Tab Take 2 Tablets by mouth every day for 4 days.  Qty: 8 Tablet, Refills: 0    Associated Diagnoses: Chronic obstructive pulmonary disease with acute exacerbation (HCC)           Follow up  Lili Reddy M.D.  [address]  [address]  [facility] [address]  [phone]          FINAL IMPRESSION  1.  COPD with acute exacerbation    This dictation was created using voice recognition software. The accuracy of the dictation is limited to the abilities of the software. I expect there may be some errors of grammar and possibly content. The nursing notes were reviewed and certain aspects of this information were incorporated into this note.    Electronically signed by: David Alejandra M.D., 6/25/2025             [1]   Past Medical History:  Diagnosis Date    A-fib (HCC)     MONI (acute kidney  injury) ([facility]) 2023    Asthma     Bipolar 2 disorder ([facility])     Chickenpox     Chronic obstructive pulmonary disease ([facility])     Congestive heart failure ([facility])     Hypertension     On home oxygen therapy     Other psychological stress     Schizophrenic disorder ([facility])     Yeast infection of the skin 2021   [2]   Social History  Tobacco Use    Smoking status: Some Days     Current packs/day: 0.00     Types: Cigarettes     Last attempt to quit: 10/12/2022     Years since quittin.7    Smokeless tobacco: Never   Vaping Use    Vaping status: Never Used   Substance Use Topics    Alcohol use: Not Currently    Drug use: Never   [3]   Allergies  Allergen Reactions    Amoxicillin Rash and Itching     Tolerates cephalosporins, pt reports that she gets a rash all over her body and gets itchy    Penicillin G Rash and Itching     pt reports that she gets a rash all over her body and gets itchy

## 2025-06-27 ENCOUNTER — APPOINTMENT (OUTPATIENT)
Dept: RADIOLOGY | Facility: MEDICAL CENTER | Age: 50
End: 2025-06-27
Attending: STUDENT IN AN ORGANIZED HEALTH CARE EDUCATION/TRAINING PROGRAM
Payer: MEDICAID

## 2025-06-27 ENCOUNTER — HOSPITAL ENCOUNTER (EMERGENCY)
Facility: MEDICAL CENTER | Age: 50
End: 2025-06-27
Attending: STUDENT IN AN ORGANIZED HEALTH CARE EDUCATION/TRAINING PROGRAM
Payer: MEDICAID

## 2025-06-27 VITALS
SYSTOLIC BLOOD PRESSURE: 117 MMHG | TEMPERATURE: 98.6 F | HEIGHT: 64 IN | OXYGEN SATURATION: 91 % | RESPIRATION RATE: 18 BRPM | BODY MASS INDEX: 48.48 KG/M2 | WEIGHT: 283.95 LBS | HEART RATE: 59 BPM | DIASTOLIC BLOOD PRESSURE: 56 MMHG

## 2025-06-27 DIAGNOSIS — M25.572 ACUTE LEFT ANKLE PAIN: ICD-10-CM

## 2025-06-27 DIAGNOSIS — S93.492A SPRAIN OF ANTERIOR TALOFIBULAR LIGAMENT OF LEFT ANKLE, INITIAL ENCOUNTER: Primary | ICD-10-CM

## 2025-06-27 PROCEDURE — 73610 X-RAY EXAM OF ANKLE: CPT | Mod: LT

## 2025-06-27 PROCEDURE — 99283 EMERGENCY DEPT VISIT LOW MDM: CPT

## 2025-06-27 ASSESSMENT — PAIN DESCRIPTION - PAIN TYPE: TYPE: ACUTE PAIN

## 2025-06-27 ASSESSMENT — FIBROSIS 4 INDEX: FIB4 SCORE: .9086882225022429078

## 2025-06-28 NOTE — ED TRIAGE NOTES
Lorene Haro  49 y.o. female    Chief Complaint   Patient presents with    Ankle Pain            Pt arrives via EMS with complaints of L ankle pain that began when pt rolled ankle today. Pt states she is having numbness from ankle to mid calf. Ambulatory with steady gait     Vitals:    06/27/25 1646   BP: (!) 153/50   Pulse: 61   Resp: 16   Temp: 37 °C (98.6 °F)   SpO2: 91%       Triage process explained to patient, apologized for wait time, and returned to lobby.  Pt informed to notify staff of any change in condition.

## 2025-06-28 NOTE — ED PROVIDER NOTES
ED Provider Note    CHIEF COMPLAINT  Chief Complaint   Patient presents with    Ankle Pain              EXTERNAL RECORDS REVIEWED      HPI/ROS  LIMITATION TO HISTORY   Select: : None  OUTSIDE HISTORIAN(S):      Lorene Haro is a 49 y.o. female who presents with acute left ankle pain after rolling her ankle.  Patient has been able to walk but is having some mild discomfort.  Denies other injury    PAST MEDICAL HISTORY   has a past medical history of A-fib (HCC), MONI (acute kidney injury) (HCC) (2023), Asthma, Bipolar 2 disorder (HCC), Chickenpox, Chronic obstructive pulmonary disease (HCC), Congestive heart failure (HCC), Hypertension, On home oxygen therapy, Other psychological stress, Schizophrenic disorder (HCC), and Yeast infection of the skin (2021).    SURGICAL HISTORY   has a past surgical history that includes hysterectomy laparoscopy; colectomy; cholecystectomy; knee arthroscopy (Left); and orif, ankle (Left, 2024).    FAMILY HISTORY  Family History   Problem Relation Age of Onset    No Known Problems Mother     Cancer Sister        SOCIAL HISTORY  Social History     Tobacco Use    Smoking status: Some Days     Current packs/day: 0.00     Types: Cigarettes     Last attempt to quit: 10/12/2022     Years since quittin.7    Smokeless tobacco: Never   Vaping Use    Vaping status: Never Used   Substance and Sexual Activity    Alcohol use: Not Currently    Drug use: Never    Sexual activity: Not Currently       CURRENT MEDICATIONS  Home Medications       Reviewed by Hanna Sam R.N. (Registered Nurse) on 25 at 1704  Med List Status: Not Addressed     Medication Last Dose Status   albuterol 108 (90 Base) MCG/ACT Aero Soln inhalation aerosol  Active   aripiprazole (ABILIFY) 30 MG tablet  Active   ascorbic acid (VITAMIN C) 500 MG tablet  Active   atorvastatin (LIPITOR) 20 MG Tab  Active   cyclobenzaprine (FLEXERIL) 10 mg Tab  Active   ELIQUIS 5 MG Tab  Active   ferrous sulfate 325  "(65 Fe) MG tablet  Active   fluticasone (FLONASE) 50 MCG/ACT nasal spray  Active   fluticasone-salmeterol (ADVAIR DISKUS) 100-50 MCG/ACT AEROSOL POWDER, BREATH ACTIVATED  Active   gabapentin (NEURONTIN) 300 MG Cap  Active   hydrOXYzine HCl (ATARAX) 25 MG Tab  Active   ketoconazole (NIZORAL) 2 % Cream  Active   lidocaine (LIDODERM) 5 % Patch  Active   metoprolol SR (TOPROL XL) 50 MG TABLET SR 24 HR  Active   montelukast (SINGULAIR) 10 MG Tab  Active   ondansetron (ZOFRAN ODT) 4 MG TABLET DISPERSIBLE  Active   predniSONE (DELTASONE) 20 MG Tab  Active   Vitamin D, Cholecalciferol, (CHOLECALCIFEROL) 25 MCG (1000 UT) Tab  Active                    ALLERGIES  Allergies[1]    PHYSICAL EXAM  VITAL SIGNS: /56   Pulse (!) 59   Temp 37 °C (98.6 °F) (Temporal)   Resp 18   Ht 1.626 m (5' 4\")   Wt (!) 129 kg (283 lb 15.2 oz)   LMP  (LMP Unknown)   SpO2 91%   BMI 48.74 kg/m²    Physical Exam  Vitals and nursing note reviewed.   Constitutional:       Appearance: She is well-developed.   HENT:      Head: Normocephalic.   Eyes:      Extraocular Movements: Extraocular movements intact.      Pupils: Pupils are equal, round, and reactive to light.   Cardiovascular:      Rate and Rhythm: Normal rate and regular rhythm.   Pulmonary:      Effort: Pulmonary effort is normal.      Breath sounds: Normal breath sounds.   Abdominal:      Palpations: Abdomen is soft.      Tenderness: There is no abdominal tenderness.   Musculoskeletal:      Cervical back: Normal range of motion.      Comments: Tenderness palpation of the lateral aspect of the left ankle.   Neurological:      Mental Status: She is alert and oriented to person, place, and time.           RADIOLOGY/PROCEDURES   I have independently interpreted the diagnostic imaging associated with this visit and am waiting the final reading from the radiologist.   My preliminary interpretation is as follows: X-ray of left ankle shows no acute fracture dislocation or foreign " body    Radiologist interpretation:  DX-ANKLE 3+ VIEWS LEFT   Final Result      1.  Calcaneus fracture with prior internal fixation, unchanged.   2.  Mild soft tissue swelling.   3.  Degenerative change of ankle joint.             COURSE & MEDICAL DECISION MAKING    ASSESSMENT, COURSE AND PLAN  Care Narrative: 49-year-old female presenting with acute left ankle pain after rolling her ankle.  There is mild tenderness and swelling of the left lateral ankle prompting further evaluation with x-ray which thankfully is within normal limits at this time.  Patient diagnosed with ankle sprain discussed supportive care measures and return precautions for worsening.              ADDITIONAL PROBLEMS MANAGED      DISPOSITION AND DISCUSSIONS  I have discussed management of the patient with the following physicians and JAY's:      Discussion of management with other QHP or appropriate source(s):      Escalation of care considered, and ultimately not performed:    Barriers to care at this time, including but not limited to: Patient lacks transportation .     Decision tools and prescription drugs considered including, but not limited to: Pain Medications NSAIDs Tylenol.    FINAL DIAGNOSIS  1. Sprain of anterior talofibular ligament of left ankle, initial encounter    2. Acute left ankle pain         Electronically signed by: Fernando Ferraro M.D., 6/27/2025 10:47 PM           [1]   Allergies  Allergen Reactions    Amoxicillin Rash and Itching     Tolerates cephalosporins, pt reports that she gets a rash all over her body and gets itchy    Penicillin G Rash and Itching     pt reports that she gets a rash all over her body and gets itchy

## 2025-06-28 NOTE — ED NOTES
Patient Identifiers verified; patient ambulated to room with a slow steady gait using baseline 4wheel walker for support; connected patient to BP and pulse ox monitoring; charted the Patient for an Emergency Room Physician to see.

## 2025-06-29 ENCOUNTER — HOSPITAL ENCOUNTER (EMERGENCY)
Facility: MEDICAL CENTER | Age: 50
End: 2025-06-29
Attending: STUDENT IN AN ORGANIZED HEALTH CARE EDUCATION/TRAINING PROGRAM
Payer: MEDICAID

## 2025-06-29 VITALS
RESPIRATION RATE: 16 BRPM | HEIGHT: 64 IN | TEMPERATURE: 98.2 F | DIASTOLIC BLOOD PRESSURE: 56 MMHG | WEIGHT: 284.39 LBS | BODY MASS INDEX: 48.55 KG/M2 | HEART RATE: 47 BPM | OXYGEN SATURATION: 97 % | SYSTOLIC BLOOD PRESSURE: 107 MMHG

## 2025-06-29 DIAGNOSIS — R53.81 MALAISE: Primary | ICD-10-CM

## 2025-06-29 DIAGNOSIS — R09.82 POSTNASAL DRIP: ICD-10-CM

## 2025-06-29 DIAGNOSIS — B34.9 VIRAL SYNDROME: ICD-10-CM

## 2025-06-29 LAB
FLUAV RNA SPEC QL NAA+PROBE: NEGATIVE
FLUBV RNA SPEC QL NAA+PROBE: NEGATIVE
RSV RNA SPEC QL NAA+PROBE: NEGATIVE
SARS-COV-2 RNA RESP QL NAA+PROBE: NEGATIVE

## 2025-06-29 PROCEDURE — 99284 EMERGENCY DEPT VISIT MOD MDM: CPT

## 2025-06-29 PROCEDURE — 700102 HCHG RX REV CODE 250 W/ 637 OVERRIDE(OP): Mod: UD | Performed by: STUDENT IN AN ORGANIZED HEALTH CARE EDUCATION/TRAINING PROGRAM

## 2025-06-29 PROCEDURE — 700111 HCHG RX REV CODE 636 W/ 250 OVERRIDE (IP): Mod: UD | Performed by: STUDENT IN AN ORGANIZED HEALTH CARE EDUCATION/TRAINING PROGRAM

## 2025-06-29 PROCEDURE — 0241U POC COV-2, FLU A/B, RSV BY PCR: CPT

## 2025-06-29 PROCEDURE — A9270 NON-COVERED ITEM OR SERVICE: HCPCS | Mod: UD | Performed by: STUDENT IN AN ORGANIZED HEALTH CARE EDUCATION/TRAINING PROGRAM

## 2025-06-29 RX ORDER — ONDANSETRON 4 MG/1
4 TABLET, ORALLY DISINTEGRATING ORAL EVERY 6 HOURS PRN
Qty: 10 TABLET | Refills: 0 | Status: SHIPPED | OUTPATIENT
Start: 2025-06-29

## 2025-06-29 RX ORDER — ACETAMINOPHEN 325 MG/1
975 TABLET ORAL ONCE
Status: COMPLETED | OUTPATIENT
Start: 2025-06-29 | End: 2025-06-29

## 2025-06-29 RX ORDER — GUAIFENESIN 600 MG/1
600 TABLET, EXTENDED RELEASE ORAL 2 TIMES DAILY PRN
Qty: 10 TABLET | Refills: 0 | Status: SHIPPED | OUTPATIENT
Start: 2025-06-29

## 2025-06-29 RX ORDER — ONDANSETRON 4 MG/1
4 TABLET, ORALLY DISINTEGRATING ORAL ONCE
Status: COMPLETED | OUTPATIENT
Start: 2025-06-29 | End: 2025-06-29

## 2025-06-29 RX ORDER — GUAIFENESIN 600 MG/1
600 TABLET, EXTENDED RELEASE ORAL ONCE
Status: COMPLETED | OUTPATIENT
Start: 2025-06-29 | End: 2025-06-29

## 2025-06-29 RX ADMIN — ACETAMINOPHEN 975 MG: 325 TABLET ORAL at 17:31

## 2025-06-29 RX ADMIN — ONDANSETRON 4 MG: 4 TABLET, ORALLY DISINTEGRATING ORAL at 17:31

## 2025-06-29 RX ADMIN — GUAIFENESIN 600 MG: 600 TABLET, EXTENDED RELEASE ORAL at 17:31

## 2025-06-29 ASSESSMENT — FIBROSIS 4 INDEX: FIB4 SCORE: .9086882225022429078

## 2025-06-29 NOTE — ED NOTES
Chief Complaint   Patient presents with    Malaise     Patient reports after a nap today feeling tired and hot.     Other     Patient requesting to have new placement in a new group home. Patient currently lives at [facility] in [place].      Viral swab collected and running.

## 2025-06-30 NOTE — ED PROVIDER NOTES
ER Provider Note    Scribed for Dr. Fernando Ferraro MD. by Jennifer Delacruz. 6/29/2025  5:14 PM    Primary Care Provider: Lili Reddy M.D.    CHIEF COMPLAINT  Chief Complaint   Patient presents with    Malaise     Patient reports after a nap today feeling tired and hot.     Other     Patient requesting to have new placement in a new group home. Patient currently lives at [facility] in [place].      EXTERNAL RECORDS REVIEWED  Inpatient Notes Patient was seen in the ED two days ago for ankle pain.     HPI/ROS  LIMITATION TO HISTORY   Select: : None    OUTSIDE HISTORIAN(S):  None    Lorene Haro is a 49 y.o. female who presents to the ED via EMS for malaise onset earlier today. The patient states that she had gone to [employer] earlier today and felt normal, however after taking her nap she began feeling hot and tired. She has associated tactile fever, nausea and a sore throat, describing that she was having episodes of acid reflux. The patient notes that she will have pain when she tries to speak. Patient states that she has been using her rescue inhaler, adding that she had taken Tylenol yesterday. The patient reports that she does use oxygen at night.    PAST MEDICAL HISTORY  Past Medical History[1]    SURGICAL HISTORY  Past Surgical History[2]    FAMILY HISTORY  Family History   Problem Relation Age of Onset    No Known Problems Mother     Cancer Sister      SOCIAL HISTORY   reports that she quit smoking about 2 years ago. Her smoking use included cigarettes. She has never used smokeless tobacco. She reports that she does not currently use alcohol. She reports that she does not use drugs.    CURRENT MEDICATIONS  Previous Medications    ALBUTEROL 108 (90 BASE) MCG/ACT AERO SOLN INHALATION AEROSOL    Inhale 2 Puffs every 6 hours as needed for Shortness of Breath.    ARIPIPRAZOLE (ABILIFY) 30 MG TABLET    Take 1 Tablet by mouth every morning. Indications: Major Depressive Disorder    ASCORBIC ACID (VITAMIN C) 500  "MG TABLET    Take 500 mg by mouth every day.    ATORVASTATIN (LIPITOR) 20 MG TAB    Take 1 Tablet by mouth at bedtime.    CYCLOBENZAPRINE (FLEXERIL) 10 MG TAB    Take 1 Tablet by mouth 3 times a day as needed for Moderate Pain or Muscle Spasms.    ELIQUIS 5 MG TAB    TAKE 1 TABLET BY MOUTH TWICE DAILY.    FERROUS SULFATE 325 (65 FE) MG TABLET    Take 325 mg by mouth every 48 hours.    FLUTICASONE (FLONASE) 50 MCG/ACT NASAL SPRAY    Administer 2 Sprays into each nostril every morning.    FLUTICASONE-SALMETEROL (ADVAIR DISKUS) 100-50 MCG/ACT AEROSOL POWDER, BREATH ACTIVATED    Inhale 1 Puff 2 times a day.    GABAPENTIN (NEURONTIN) 300 MG CAP    Take 1 Capsule by mouth 3 times a day.    HYDROXYZINE HCL (ATARAX) 25 MG TAB    Take 1 Tablet by mouth 3 times a day as needed for Itching.    KETOCONAZOLE (NIZORAL) 2 % CREAM    Apply 1 Application topically 2 times a day.    LIDOCAINE (LIDODERM) 5 % PATCH    Place 1 Patch on the skin every 12 hours.    METOPROLOL SR (TOPROL XL) 50 MG TABLET SR 24 HR    Take 1 Tablet by mouth every day.    MONTELUKAST (SINGULAIR) 10 MG TAB    TAKE 1 TABLET BY MOUTH ONCE DAILY    PREDNISONE (DELTASONE) 20 MG TAB    Take 2 Tablets by mouth every day for 4 days.    VITAMIN D, CHOLECALCIFEROL, (CHOLECALCIFEROL) 25 MCG (1000 UT) TAB    Take 2 Tablets by mouth every day.       ALLERGIES  Amoxicillin and Penicillin g    PHYSICAL EXAM  BP (!) 142/66   Pulse (!) 51   Temp 36.6 °C (97.8 °F) (Temporal)   Resp 20   Ht 1.626 m (5' 4\")   Wt (!) 129 kg (284 lb 6.3 oz)   LMP  (LMP Unknown)   SpO2 98%   BMI 48.82 kg/m²   Physical Exam  Vitals and nursing note reviewed.   Constitutional:       Comments: Chronically ill appearing.    HENT:      Head: Normocephalic.   Eyes:      Extraocular Movements: Extraocular movements intact.      Pupils: Pupils are equal, round, and reactive to light.   Cardiovascular:      Rate and Rhythm: Normal rate and regular rhythm.   Pulmonary:      Effort: Pulmonary effort " is normal.      Breath sounds: Normal breath sounds.   Abdominal:      Palpations: Abdomen is soft.      Tenderness: There is no abdominal tenderness.   Musculoskeletal:      Cervical back: Normal range of motion.   Neurological:      Mental Status: She is alert and oriented to person, place, and time.       DIAGNOSTIC STUDIES & PROCEDURES        COURSE & MEDICAL DECISION MAKING    INITIAL ASSESSMENT AND PLAN  Care Narrative:       5:14 PM - Patient seen and evaluated at bedside.  49-year-old female chronically ill presenting for generalized fatigue, postnasal drip, congestion, nausea.  She otherwise appears well on exam and is at her baseline state of health.  Therefore do not feel further laboratory analysis is necessary.  She was given supportive care measures with guaifenesin, Zofran, Tylenol with improvement.  She is stable for discharge back to group home with additional supportive care measures and return precautions for worsening.    7:01 PM - I reevaluated the patient at bedside. The patient informs me they feel improved. I discussed the patient's diagnostic study results with the patient at this time. I discussed plan for discharge and follow up as outlined below. The patient is stable for discharge at this time and will return for any new or worsening symptoms. Patient verbalizes understanding and support with my plan for discharge.       ADDITIONAL PROBLEM LIST AND DISPOSITION  Past Medical History[3]               DISPOSITION AND DISCUSSIONS  I have discussed management of the patient with the following physicians and JAY's: None    Discussion of management with other [facility] or appropriate source(s): None     Escalation of care considered, and ultimately not performed: acute inpatient care management, however at this time, the patient is most appropriate for outpatient management.    Barriers to care at this time, including but not limited to: None.     Decision tools and prescription drugs considered  including, but not limited to: Mucinex and Zofran.    The patient will return for new or worsening symptoms and is stable at the time of discharge.    DISPOSITION:  Patient will be discharged home in stable condition.    FOLLOW UP:  Lili Reddy M.D.  [address]  [address]  [facility] [address]  [phone]          [facility], Emergency Dept  [address] [facility] [address] [phone]        OUTPATIENT MEDICATIONS:  Discharge Medication List as of 6/29/2025  7:27 PM        START taking these medications    Details   ondansetron (ZOFRAN ODT) 4 MG TABLET DISPERSIBLE Take 1 Tablet by mouth every 6 hours as needed for Nausea/Vomiting., Disp-10 Tablet, R-0, Normal      guaiFENesin ER (MUCINEX) 600 MG TABLET SR 12 HR Take 1 Tablet by mouth 2 times a day as needed for Cough., Disp-10 Tablet, R-0, Normal           FINAL IMPRESSION   1. Malaise    2. Viral syndrome    3. Postnasal drip       IJennifer (Scribe), am scribing for, and in the presence of, Fernando Ferraro M.D..    Electronically signed by: Jennifer Delacruz (Scribe), 6/29/2025    IFernando M.D. personally performed the services described in this documentation, as scribed by Jennifer Delacruz in my presence, and it is both accurate and complete.    The note accurately reflects work and decisions made by me.  Fernando Ferraro M.D.  6/29/2025  7:33 PM         [1]   Past Medical History:  Diagnosis Date    A-fib (HCC)     MONI (acute kidney injury) (HCC) 08/09/2023    Asthma     Bipolar 2 disorder (HCC)     Chickenpox     Chronic obstructive pulmonary disease (HCC)     Congestive heart failure (HCC)     Hypertension     On home oxygen therapy     Other psychological stress     Schizophrenic disorder (HCC)     Yeast infection of the skin 04/01/2021   [2]   Past Surgical History:  Procedure Laterality Date    ORIF, ANKLE Left 11/2/2024    Procedure: ORIF, ANKLE- TALUS;  Surgeon: Roman Rosa M.D.;  Location:  [facility];  Service: Orthopedics    CHOLECYSTECTOMY      COLECTOMY      HYSTERECTOMY LAPAROSCOPY      KNEE ARTHROSCOPY Left    [3]   Past Medical History:  Diagnosis Date    A-fib (HCC)     MONI (acute kidney injury) (HCC) 08/09/2023    Asthma     Bipolar 2 disorder (HCC)     Chickenpox     Chronic obstructive pulmonary disease (HCC)     Congestive heart failure (HCC)     Hypertension     On home oxygen therapy     Other psychological stress     Schizophrenic disorder (HCC)     Yeast infection of the skin 04/01/2021

## 2025-06-30 NOTE — ED NOTES
Pt discharged to home. Discharge paperwork provided. Education provided by ERP. Reinforced discharge instructions.  Pt was given follow up instructions and prescriptions.  Pt verbalized understanding of all instructions for discharge.   Patient went out of the ER via wheelchair, alert and oriented x 4, with all belongings.

## 2025-07-05 ENCOUNTER — APPOINTMENT (OUTPATIENT)
Dept: RADIOLOGY | Facility: MEDICAL CENTER | Age: 50
End: 2025-07-05
Attending: STUDENT IN AN ORGANIZED HEALTH CARE EDUCATION/TRAINING PROGRAM
Payer: MEDICAID

## 2025-07-05 ENCOUNTER — HOSPITAL ENCOUNTER (EMERGENCY)
Facility: MEDICAL CENTER | Age: 50
End: 2025-07-06
Attending: STUDENT IN AN ORGANIZED HEALTH CARE EDUCATION/TRAINING PROGRAM
Payer: MEDICAID

## 2025-07-05 VITALS
OXYGEN SATURATION: 98 % | WEIGHT: 285.94 LBS | SYSTOLIC BLOOD PRESSURE: 124 MMHG | HEIGHT: 61 IN | DIASTOLIC BLOOD PRESSURE: 71 MMHG | BODY MASS INDEX: 53.99 KG/M2 | HEART RATE: 67 BPM | TEMPERATURE: 98.5 F | RESPIRATION RATE: 20 BRPM

## 2025-07-05 DIAGNOSIS — R07.9 CHEST PAIN, UNSPECIFIED TYPE: Primary | ICD-10-CM

## 2025-07-05 LAB
ALBUMIN SERPL BCP-MCNC: 3.9 G/DL (ref 3.2–4.9)
ALBUMIN/GLOB SERPL: 1.3 G/DL
ALP SERPL-CCNC: 119 U/L (ref 30–99)
ALT SERPL-CCNC: 17 U/L (ref 2–50)
ANION GAP SERPL CALC-SCNC: 11 MMOL/L (ref 7–16)
AST SERPL-CCNC: 14 U/L (ref 12–45)
BASOPHILS # BLD AUTO: 0.2 % (ref 0–1.8)
BASOPHILS # BLD: 0.02 K/UL (ref 0–0.12)
BILIRUB SERPL-MCNC: 0.7 MG/DL (ref 0.1–1.5)
BUN SERPL-MCNC: 13 MG/DL (ref 8–22)
CALCIUM ALBUM COR SERPL-MCNC: 9.2 MG/DL (ref 8.5–10.5)
CALCIUM SERPL-MCNC: 9.1 MG/DL (ref 8.5–10.5)
CHLORIDE SERPL-SCNC: 105 MMOL/L (ref 96–112)
CO2 SERPL-SCNC: 26 MMOL/L (ref 20–33)
CREAT SERPL-MCNC: 1.12 MG/DL (ref 0.5–1.4)
EKG IMPRESSION: NORMAL
EOSINOPHIL # BLD AUTO: 0.16 K/UL (ref 0–0.51)
EOSINOPHIL NFR BLD: 1.3 % (ref 0–6.9)
ERYTHROCYTE [DISTWIDTH] IN BLOOD BY AUTOMATED COUNT: 50.3 FL (ref 35.9–50)
GFR SERPLBLD CREATININE-BSD FMLA CKD-EPI: 59 ML/MIN/1.73 M 2
GLOBULIN SER CALC-MCNC: 3 G/DL (ref 1.9–3.5)
GLUCOSE SERPL-MCNC: 127 MG/DL (ref 65–99)
HCT VFR BLD AUTO: 39.8 % (ref 37–47)
HGB BLD-MCNC: 12 G/DL (ref 12–16)
IMM GRANULOCYTES # BLD AUTO: 0.05 K/UL (ref 0–0.11)
IMM GRANULOCYTES NFR BLD AUTO: 0.4 % (ref 0–0.9)
LYMPHOCYTES # BLD AUTO: 2.42 K/UL (ref 1–4.8)
LYMPHOCYTES NFR BLD: 19.7 % (ref 22–41)
MCH RBC QN AUTO: 26.8 PG (ref 27–33)
MCHC RBC AUTO-ENTMCNC: 30.2 G/DL (ref 32.2–35.5)
MCV RBC AUTO: 88.8 FL (ref 81.4–97.8)
MONOCYTES # BLD AUTO: 0.71 K/UL (ref 0–0.85)
MONOCYTES NFR BLD AUTO: 5.8 % (ref 0–13.4)
NEUTROPHILS # BLD AUTO: 8.93 K/UL (ref 1.82–7.42)
NEUTROPHILS NFR BLD: 72.6 % (ref 44–72)
NRBC # BLD AUTO: 0 K/UL
NRBC BLD-RTO: 0 /100 WBC (ref 0–0.2)
NT-PROBNP SERPL IA-MCNC: 235 PG/ML (ref 0–125)
PLATELET # BLD AUTO: 213 K/UL (ref 164–446)
PMV BLD AUTO: 12 FL (ref 9–12.9)
POTASSIUM SERPL-SCNC: 4 MMOL/L (ref 3.6–5.5)
PROT SERPL-MCNC: 6.9 G/DL (ref 6–8.2)
RBC # BLD AUTO: 4.48 M/UL (ref 4.2–5.4)
SODIUM SERPL-SCNC: 142 MMOL/L (ref 135–145)
TROPONIN T SERPL-MCNC: 33 NG/L (ref 6–19)
WBC # BLD AUTO: 12.3 K/UL (ref 4.8–10.8)

## 2025-07-05 PROCEDURE — 84484 ASSAY OF TROPONIN QUANT: CPT

## 2025-07-05 PROCEDURE — 99285 EMERGENCY DEPT VISIT HI MDM: CPT

## 2025-07-05 PROCEDURE — 700102 HCHG RX REV CODE 250 W/ 637 OVERRIDE(OP): Mod: UD | Performed by: STUDENT IN AN ORGANIZED HEALTH CARE EDUCATION/TRAINING PROGRAM

## 2025-07-05 PROCEDURE — 94640 AIRWAY INHALATION TREATMENT: CPT

## 2025-07-05 PROCEDURE — 93005 ELECTROCARDIOGRAM TRACING: CPT | Mod: TC | Performed by: STUDENT IN AN ORGANIZED HEALTH CARE EDUCATION/TRAINING PROGRAM

## 2025-07-05 PROCEDURE — 80053 COMPREHEN METABOLIC PANEL: CPT

## 2025-07-05 PROCEDURE — 83880 ASSAY OF NATRIURETIC PEPTIDE: CPT

## 2025-07-05 PROCEDURE — 93005 ELECTROCARDIOGRAM TRACING: CPT | Mod: TC

## 2025-07-05 PROCEDURE — 85025 COMPLETE CBC W/AUTO DIFF WBC: CPT

## 2025-07-05 PROCEDURE — 71045 X-RAY EXAM CHEST 1 VIEW: CPT

## 2025-07-05 PROCEDURE — 36415 COLL VENOUS BLD VENIPUNCTURE: CPT

## 2025-07-05 PROCEDURE — A9270 NON-COVERED ITEM OR SERVICE: HCPCS | Mod: UD | Performed by: STUDENT IN AN ORGANIZED HEALTH CARE EDUCATION/TRAINING PROGRAM

## 2025-07-05 PROCEDURE — 700101 HCHG RX REV CODE 250: Mod: UD | Performed by: STUDENT IN AN ORGANIZED HEALTH CARE EDUCATION/TRAINING PROGRAM

## 2025-07-05 RX ORDER — ASPIRIN 325 MG
325 TABLET ORAL ONCE
Status: COMPLETED | OUTPATIENT
Start: 2025-07-06 | End: 2025-07-05

## 2025-07-05 RX ORDER — IPRATROPIUM BROMIDE AND ALBUTEROL SULFATE 2.5; .5 MG/3ML; MG/3ML
3 SOLUTION RESPIRATORY (INHALATION) ONCE
Status: COMPLETED | OUTPATIENT
Start: 2025-07-05 | End: 2025-07-05

## 2025-07-05 RX ADMIN — ASPIRIN 325 MG: 325 TABLET ORAL at 23:53

## 2025-07-05 RX ADMIN — IPRATROPIUM BROMIDE AND ALBUTEROL SULFATE 3 ML: .5; 2.5 SOLUTION RESPIRATORY (INHALATION) at 23:13

## 2025-07-05 ASSESSMENT — FIBROSIS 4 INDEX: FIB4 SCORE: .9086882225022429078

## 2025-07-05 ASSESSMENT — PAIN DESCRIPTION - PAIN TYPE: TYPE: ACUTE PAIN

## 2025-07-06 ASSESSMENT — HEART SCORE
RISK FACTORS: 1-2 RISK FACTORS
AGE: 45-64
HISTORY: SLIGHTLY SUSPICIOUS
HEART SCORE: 3
ECG: NORMAL
TROPONIN: 1-3 TIMES NORMAL LIMIT

## 2025-07-06 NOTE — ED TRIAGE NOTES
"Chief Complaint   Patient presents with    Chest Pain     BIBA from group home, per EMS patient had sudden onset chest pain and shortness of breath x2 hours ago. Constant, non-radiating. + Eliquis HX: known A-FIB, COPD, CHF.    Shortness of Breath     Started today, 2L BL at home.     Patient ambulatory to triage for above complaint w personal walker. Patient A&Ox4, GCS 15, patient speaking in full sentences. Equal and unlabored respirations. Patient educated on triage process and encouraged to notify staff if condition worsens. Appropriate protocols ordered. Patient to phlebotomy in stable condition.     Patient on 4L in triage up from 2L BL.    BP (!) 145/74   Pulse 75   Temp 36.9 °C (98.5 °F) (Temporal)   Resp 18   Ht 1.549 m (5' 1\")   Wt (!) 130 kg (285 lb 15 oz)   LMP  (LMP Unknown)   SpO2 96%   BMI 54.03 kg/m²     "

## 2025-07-06 NOTE — ED NOTES
DC home with written and verbal instructions regarding f/u, activity and RX.  Verbalized understanding, ambulated out with personal walker

## 2025-07-06 NOTE — ED PROVIDER NOTES
ED Provider Note    CHIEF COMPLAINT  Chief Complaint   Patient presents with    Chest Pain     BIBA from group home, per EMS patient had sudden onset chest pain and shortness of breath x2 hours ago. Constant, non-radiating. + Eliquis HX: known A-FIB, COPD, CHF.    Shortness of Breath     Started today, 2L BL at home.       EXTERNAL RECORDS REVIEWED  Reviewed several emergency department notes over the last several weeks she has been here multiple times for variety of complaints including chest pain and shortness of breath    Stress test obtained 8/26/2024 showed LV ejection fraction 31% no reversible defects that would indicate ischemia echocardiogram obtained 7/31/2024 showed LV ejection fraction 59% Limited study    HPI/ROS  LIMITATION TO HISTORY   Select: : None  OUTSIDE HISTORIAN(S):  None    Lorene Haro is a 49 y.o. female who presents with past medical history of atrial fibrillation bipolar 2 disorder COPD CHF hypertension presenting to the emergency department for nonspecific chest discomfort and shortness of breath.  Patient says that symptoms started at home this evening.  She experiences these similar symptoms 3 times weekly.  She says that she took her albuterol inhaler earlier and it seemed to help with her shortness of breath but she decided to come to the emergency department for an evaluation.  She denies any increased sputum production, fever, cough.  Denies hemoptysis.    Uses 2 L of oxygen at baseline    PAST MEDICAL HISTORY   has a past medical history of A-fib ([facility]), MONI (acute kidney injury) ([facility]) (08/09/2023), Asthma, Bipolar 2 disorder ([facility]), Chickenpox, Chronic obstructive pulmonary disease (HCC), Congestive heart failure (HCC), Hypertension, On home oxygen therapy, Other psychological stress, Schizophrenic disorder ([facility]), and Yeast infection of the skin (04/01/2021).    SURGICAL HISTORY   has a past surgical history that includes hysterectomy laparoscopy; colectomy; cholecystectomy;  "knee arthroscopy (Left); and orif, ankle (Left, 2024).    FAMILY HISTORY  Family History   Problem Relation Age of Onset    No Known Problems Mother     Cancer Sister        SOCIAL HISTORY  Social History     Tobacco Use    Smoking status: Former     Current packs/day: 0.00     Types: Cigarettes     Quit date: 10/12/2022     Years since quittin.7    Smokeless tobacco: Never   Vaping Use    Vaping status: Never Used   Substance and Sexual Activity    Alcohol use: Not Currently    Drug use: Never    Sexual activity: Not Currently       CURRENT MEDICATIONS  Home Medications       Reviewed by David Norton R.N. (Registered Nurse) on 25 at 2147  Med List Status: Partial     Medication Last Dose Status   albuterol 108 (90 Base) MCG/ACT Aero Soln inhalation aerosol  Active   aripiprazole (ABILIFY) 30 MG tablet  Active   ascorbic acid (VITAMIN C) 500 MG tablet  Active   atorvastatin (LIPITOR) 20 MG Tab  Active   cyclobenzaprine (FLEXERIL) 10 mg Tab  Active   ELIQUIS 5 MG Tab  Active   ferrous sulfate 325 (65 Fe) MG tablet  Active   fluticasone (FLONASE) 50 MCG/ACT nasal spray  Active   fluticasone-salmeterol (ADVAIR DISKUS) 100-50 MCG/ACT AEROSOL POWDER, BREATH ACTIVATED  Active   gabapentin (NEURONTIN) 300 MG Cap  Active   guaiFENesin ER (MUCINEX) 600 MG TABLET SR 12 HR  Active   hydrOXYzine HCl (ATARAX) 25 MG Tab  Active   ketoconazole (NIZORAL) 2 % Cream  Active   lidocaine (LIDODERM) 5 % Patch  Active   metoprolol SR (TOPROL XL) 50 MG TABLET SR 24 HR  Active   montelukast (SINGULAIR) 10 MG Tab  Active   ondansetron (ZOFRAN ODT) 4 MG TABLET DISPERSIBLE  Active   Vitamin D, Cholecalciferol, (CHOLECALCIFEROL) 25 MCG (1000 UT) Tab  Active                    ALLERGIES  Allergies[1]    PHYSICAL EXAM  VITAL SIGNS: /71   Pulse 67   Temp 36.9 °C (98.5 °F) (Temporal)   Resp 20   Ht 1.549 m (5' 1\")   Wt (!) 130 kg (285 lb 15 oz)   LMP  (LMP Unknown)   SpO2 98%   BMI 54.03 kg/m²    General: " Chronically ill-appearing non-toxic, no acute distress morbidly obese  Neuro: oriented x 3, moving all extremities.   HEENT:   - Head: Normocephalic, atraumatic  - Eyes: PERRL  - Ears/Nose: normal external nose and ears  - Mouth: moist mucosal membranes  Resp: clear to auscultation, no increased work of breathing no expiratory wheeze.  No posteriorly  CV: Irregular rhythm no murmur  Abd: Soft, non-tender, non-distended  Extremities: 1+ nonpitting symmetric lower extremity edema  Psych: lucid and conversational     DIAGNOSTIC STUDIES / PROCEDURES    LABS and ECG  Results for orders placed or performed during the hospital encounter of 07/05/25   CBC with Differential    Collection Time: 07/05/25  9:58 PM   Result Value Ref Range    WBC 12.3 (H) 4.8 - 10.8 K/uL    RBC 4.48 4.20 - 5.40 M/uL    Hemoglobin 12.0 12.0 - 16.0 g/dL    Hematocrit 39.8 37.0 - 47.0 %    MCV 88.8 81.4 - 97.8 fL    MCH 26.8 (L) 27.0 - 33.0 pg    MCHC 30.2 (L) 32.2 - 35.5 g/dL    RDW 50.3 (H) 35.9 - 50.0 fL    Platelet Count 213 164 - 446 K/uL    MPV 12.0 9.0 - 12.9 fL    Neutrophils-Polys 72.60 (H) 44.00 - 72.00 %    Lymphocytes 19.70 (L) 22.00 - 41.00 %    Monocytes 5.80 0.00 - 13.40 %    Eosinophils 1.30 0.00 - 6.90 %    Basophils 0.20 0.00 - 1.80 %    Immature Granulocytes 0.40 0.00 - 0.90 %    Nucleated RBC 0.00 0.00 - 0.20 /100 WBC    Neutrophils (Absolute) 8.93 (H) 1.82 - 7.42 K/uL    Lymphs (Absolute) 2.42 1.00 - 4.80 K/uL    Monos (Absolute) 0.71 0.00 - 0.85 K/uL    Eos (Absolute) 0.16 0.00 - 0.51 K/uL    Baso (Absolute) 0.02 0.00 - 0.12 K/uL    Immature Granulocytes (abs) 0.05 0.00 - 0.11 K/uL    NRBC (Absolute) 0.00 K/uL   Complete Metabolic Panel (CMP)    Collection Time: 07/05/25  9:58 PM   Result Value Ref Range    Sodium 142 135 - 145 mmol/L    Potassium 4.0 3.6 - 5.5 mmol/L    Chloride 105 96 - 112 mmol/L    Co2 26 20 - 33 mmol/L    Anion Gap 11.0 7.0 - 16.0    Glucose 127 (H) 65 - 99 mg/dL    Bun 13 8 - 22 mg/dL    Creatinine 1.12  0.50 - 1.40 mg/dL    Calcium 9.1 8.5 - 10.5 mg/dL    Correct Calcium 9.2 8.5 - 10.5 mg/dL    AST(SGOT) 14 12 - 45 U/L    ALT(SGPT) 17 2 - 50 U/L    Alkaline Phosphatase 119 (H) 30 - 99 U/L    Total Bilirubin 0.7 0.1 - 1.5 mg/dL    Albumin 3.9 3.2 - 4.9 g/dL    Total Protein 6.9 6.0 - 8.2 g/dL    Globulin 3.0 1.9 - 3.5 g/dL    A-G Ratio 1.3 g/dL   proBrain Natriuretic Peptide, NT (BNP)    Collection Time: 25  9:58 PM   Result Value Ref Range    NT-proBNP 235 (H) 0 - 125 pg/mL   Troponins NOW    Collection Time: 25  9:58 PM   Result Value Ref Range    Troponin T 33 (H) 6 - 19 ng/L   ESTIMATED GFR    Collection Time: 25  9:58 PM   Result Value Ref Range    GFR (CKD-EPI) 59 (A) >60 mL/min/1.73 m 2   EKG    Collection Time: 25 11:46 PM   Result Value Ref Range    Report       [facility] Emergency Dept.    Test Date:  2025  Pt Name:    ADELINA MILLAN                Department: ER  MRN:        [id number]                      Room:  Gender:     Female                       Technician: [id number]  :        [id number]                   Requested By:ER TRIAGE PROTOCOL  Order #:    [id number]                    Reading MD: Aron Rodriguez    Measurements  Intervals                                Axis  Rate:       70                           P:          3  AR:         121                          QRS:        38  QRSD:       126                          T:          -3  QT:         398  QTc:        430    Interpretive Statements  Sinus rhythm  Atrial premature complex  Right bundle branch block  No acute st or t changes  Electronically Signed On 2025 23:46:13 PDT by Aron Rodriguez       *Note: Due to a large number of results and/or encounters for the requested time period, some results have not been displayed. A complete set of results can be found in Results Review.       RADIOLOGY  I have independently interpreted the diagnostic imaging associated with this visit and am waiting the  final reading from the radiologist.   My preliminary interpretation is as follows:   - Plain film of chest shows cardiomegaly no evidence of acute process.  Radiologist interpretation:   DX-CHEST-PORTABLE (1 VIEW)   Final Result         Findings on chest radiograph appear stable since the prior radiograph.  No new abnormalities are identified. Cardiomegaly and perihilar congestion again appear to be present.              MEDICAL DECISION MAKING      ED COURSE AND PLAN    49-year-old female with above medical history presenting to the emergency department for chest discomfort and shortness of breath.  She is a frequent visitor to the emergency department for similar symptoms.  She has been seen twice in the last couple of weeks for similar presentation.  She has on supplemental oxygen chronically 2 L, has been maintaining her sats on 2 L oxygen here in the emergency department.  She has no Rales jugular venous distention or evidence of hypervolemia suggesting CHF exacerbation.  She has no wheezes just COPD exacerbation.  Her chest x-ray shows stable cardiomegaly no new focal infiltrate.  Low suspicion for dissection or acute pulmonary embolism.  We obtained labs, CBC with borderline leukocytosis 12 BNP is stable compared to her baseline troponin is 33 which is consistent with her baseline.  Her chemistry panel is unremarkable.   EKG shows no evidence of acute ischemic changes  Patient was given breathing treatment in the emergency department with improvement in her symptoms.  I do not feel that serial troponin testing is indicated given timeframe of her symptoms..  She does not need a prescription for steroids for asthma/COPD exacerbation at this time given no significant increased work of breathing and no significant wheeze on exam.  Patient is appropriate for discharge, we discussed strict return precautions.           CHEST PAIN:   HEART Score for Major Cardiac Events  HEART Score     History: Slightly  suspicious  ECG: Normal  Age: 45-64  Risk Factors: 1-2 risk factors  Troponin: 1-3 times normal limit    Heart Score: 3    Total Score   0-3 Points = Low Score, risk of MACE 0.9-1.7%.  4-6 Points = Moderate Score, risk of MACE 12-16.6%  7-10 Points = High Score, risk of MACE 50-65%    Procedures:      ----------------------------------------------------------------------------------  DISCUSSIONS    I have discussed management of the patient with the following physicians and JAY's:      Discussion of management with other [facility] or appropriate source(s):     Escalation of care considered, and ultimately not performed: Consider CT pulmonary angiogram.  Consider repeat troponin.  Considered admission to the hospital for chest pain workup.    Barriers to care at this time, including but not limited to:     Decision tools and prescription drugs considered including, but not limited to: Heart score    FINAL IMPRESSION    1. Chest pain, unspecified type        Discharge Medication List as of 7/5/2025 11:46 PM          No follow-up provider specified.      DISPOSITION    Discharge home, Stable        This chart was dictated using an electronic voice recognition software. The chart has been reviewed and edited but there is still possibility for dictation errors due to limitation of software.    Aron Rodriguez, DO 7/5/2025          [1]   Allergies  Allergen Reactions    Amoxicillin Rash and Itching     Tolerates cephalosporins, pt reports that she gets a rash all over her body and gets itchy    Penicillin G Rash and Itching     pt reports that she gets a rash all over her body and gets itchy

## 2025-07-06 NOTE — ED NOTES
Pt wheeled in  from ED lobby to room Red 5. Pt transfers from  to [facility]. Pt on 4L NC, typically on 2L BL. Pt instructed to put on hospital gown. Pt hooked to continuous monitoring. RN at bedside for primary assessment. ERP to see.

## 2025-07-09 ENCOUNTER — APPOINTMENT (OUTPATIENT)
Dept: RADIOLOGY | Facility: MEDICAL CENTER | Age: 50
End: 2025-07-09
Attending: STUDENT IN AN ORGANIZED HEALTH CARE EDUCATION/TRAINING PROGRAM
Payer: MEDICAID

## 2025-07-09 ENCOUNTER — HOSPITAL ENCOUNTER (EMERGENCY)
Facility: MEDICAL CENTER | Age: 50
End: 2025-07-09
Attending: STUDENT IN AN ORGANIZED HEALTH CARE EDUCATION/TRAINING PROGRAM
Payer: MEDICAID

## 2025-07-09 VITALS
WEIGHT: 276 LBS | DIASTOLIC BLOOD PRESSURE: 59 MMHG | HEIGHT: 61 IN | SYSTOLIC BLOOD PRESSURE: 126 MMHG | BODY MASS INDEX: 52.11 KG/M2 | HEART RATE: 66 BPM | OXYGEN SATURATION: 89 % | TEMPERATURE: 97.1 F | RESPIRATION RATE: 14 BRPM

## 2025-07-09 DIAGNOSIS — M79.662 PAIN OF LEFT LOWER LEG: Primary | ICD-10-CM

## 2025-07-09 LAB
ALBUMIN SERPL BCP-MCNC: 3.9 G/DL (ref 3.2–4.9)
ALBUMIN/GLOB SERPL: 1.3 G/DL
ALP SERPL-CCNC: 111 U/L (ref 30–99)
ALT SERPL-CCNC: 16 U/L (ref 2–50)
ANION GAP SERPL CALC-SCNC: 10 MMOL/L (ref 7–16)
AST SERPL-CCNC: 14 U/L (ref 12–45)
BASOPHILS # BLD AUTO: 0.2 % (ref 0–1.8)
BASOPHILS # BLD: 0.02 K/UL (ref 0–0.12)
BILIRUB SERPL-MCNC: 0.7 MG/DL (ref 0.1–1.5)
BUN SERPL-MCNC: 14 MG/DL (ref 8–22)
CALCIUM ALBUM COR SERPL-MCNC: 9.5 MG/DL (ref 8.5–10.5)
CALCIUM SERPL-MCNC: 9.4 MG/DL (ref 8.5–10.5)
CHLORIDE SERPL-SCNC: 104 MMOL/L (ref 96–112)
CO2 SERPL-SCNC: 26 MMOL/L (ref 20–33)
CREAT SERPL-MCNC: 1.08 MG/DL (ref 0.5–1.4)
EKG IMPRESSION: NORMAL
EOSINOPHIL # BLD AUTO: 0.14 K/UL (ref 0–0.51)
EOSINOPHIL NFR BLD: 1.1 % (ref 0–6.9)
ERYTHROCYTE [DISTWIDTH] IN BLOOD BY AUTOMATED COUNT: 49.9 FL (ref 35.9–50)
GFR SERPLBLD CREATININE-BSD FMLA CKD-EPI: 62 ML/MIN/1.73 M 2
GLOBULIN SER CALC-MCNC: 3 G/DL (ref 1.9–3.5)
GLUCOSE SERPL-MCNC: 111 MG/DL (ref 65–99)
HCT VFR BLD AUTO: 38.7 % (ref 37–47)
HGB BLD-MCNC: 11.6 G/DL (ref 12–16)
IMM GRANULOCYTES # BLD AUTO: 0.04 K/UL (ref 0–0.11)
IMM GRANULOCYTES NFR BLD AUTO: 0.3 % (ref 0–0.9)
LYMPHOCYTES # BLD AUTO: 2.19 K/UL (ref 1–4.8)
LYMPHOCYTES NFR BLD: 17.8 % (ref 22–41)
MCH RBC QN AUTO: 26.4 PG (ref 27–33)
MCHC RBC AUTO-ENTMCNC: 30 G/DL (ref 32.2–35.5)
MCV RBC AUTO: 88 FL (ref 81.4–97.8)
MONOCYTES # BLD AUTO: 0.71 K/UL (ref 0–0.85)
MONOCYTES NFR BLD AUTO: 5.8 % (ref 0–13.4)
NEUTROPHILS # BLD AUTO: 9.23 K/UL (ref 1.82–7.42)
NEUTROPHILS NFR BLD: 74.8 % (ref 44–72)
NRBC # BLD AUTO: 0 K/UL
NRBC BLD-RTO: 0 /100 WBC (ref 0–0.2)
NT-PROBNP SERPL IA-MCNC: 288 PG/ML (ref 0–125)
PLATELET # BLD AUTO: 232 K/UL (ref 164–446)
PMV BLD AUTO: 11.7 FL (ref 9–12.9)
POTASSIUM SERPL-SCNC: 4.3 MMOL/L (ref 3.6–5.5)
PROT SERPL-MCNC: 6.9 G/DL (ref 6–8.2)
RBC # BLD AUTO: 4.4 M/UL (ref 4.2–5.4)
SODIUM SERPL-SCNC: 140 MMOL/L (ref 135–145)
TROPONIN T SERPL-MCNC: 38 NG/L (ref 6–19)
WBC # BLD AUTO: 12.3 K/UL (ref 4.8–10.8)

## 2025-07-09 PROCEDURE — A9270 NON-COVERED ITEM OR SERVICE: HCPCS | Mod: UD | Performed by: STUDENT IN AN ORGANIZED HEALTH CARE EDUCATION/TRAINING PROGRAM

## 2025-07-09 PROCEDURE — 73610 X-RAY EXAM OF ANKLE: CPT | Mod: LT

## 2025-07-09 PROCEDURE — 36415 COLL VENOUS BLD VENIPUNCTURE: CPT

## 2025-07-09 PROCEDURE — 93005 ELECTROCARDIOGRAM TRACING: CPT | Mod: TC | Performed by: STUDENT IN AN ORGANIZED HEALTH CARE EDUCATION/TRAINING PROGRAM

## 2025-07-09 PROCEDURE — 99284 EMERGENCY DEPT VISIT MOD MDM: CPT

## 2025-07-09 PROCEDURE — 84484 ASSAY OF TROPONIN QUANT: CPT

## 2025-07-09 PROCEDURE — 83880 ASSAY OF NATRIURETIC PEPTIDE: CPT

## 2025-07-09 PROCEDURE — 700102 HCHG RX REV CODE 250 W/ 637 OVERRIDE(OP): Mod: UD | Performed by: STUDENT IN AN ORGANIZED HEALTH CARE EDUCATION/TRAINING PROGRAM

## 2025-07-09 PROCEDURE — 80053 COMPREHEN METABOLIC PANEL: CPT

## 2025-07-09 PROCEDURE — 71045 X-RAY EXAM CHEST 1 VIEW: CPT

## 2025-07-09 PROCEDURE — 85025 COMPLETE CBC W/AUTO DIFF WBC: CPT

## 2025-07-09 RX ORDER — HYDROCODONE BITARTRATE AND ACETAMINOPHEN 5; 325 MG/1; MG/1
1 TABLET ORAL ONCE
Refills: 0 | Status: COMPLETED | OUTPATIENT
Start: 2025-07-09 | End: 2025-07-09

## 2025-07-09 RX ADMIN — HYDROCODONE BITARTRATE AND ACETAMINOPHEN 1 TABLET: 5; 325 TABLET ORAL at 20:37

## 2025-07-09 ASSESSMENT — FIBROSIS 4 INDEX: FIB4 SCORE: 0.81

## 2025-07-09 ASSESSMENT — PAIN DESCRIPTION - PAIN TYPE: TYPE: ACUTE PAIN

## 2025-07-10 NOTE — ED NOTES
Pt given discharge instructions/prescriptions send to pharm of choosing pt verbalized understanding of instructions given/pt understands importance of follow up, pt ambulatory to ER lobby.

## 2025-07-10 NOTE — ED PROVIDER NOTES
ER Provider Note    Scribed for Jasper Hernandez M.d. by Sarah Bonilla. 7/9/2025  8:00 PM    Primary Care Provider: Lili Reddy M.D.    CHIEF COMPLAINT   Chief Complaint   Patient presents with    Leg Pain     Chronic BLE swelling and pain. Pt is cognitively delayed and lives at group home.      EXTERNAL RECORDS REVIEWED  Other The patient has been seen in the ED here several times in recent months. She was most recently seen on 6/29/25 for malaise, fever, and sore throat.     HPI/ROS  LIMITATION TO HISTORY   Select: : None  OUTSIDE HISTORIAN(S):  None    Lorene Haro is a 49 y.o. female with a history of COPD who presents to the ED via EMS complaining of bilateral lower leg pain onset several days ago. The patient reports she had a hairline fracture repaired on her left foot last year here at [facility], and the swelling and pain she experienced around that time have returned recently. She reports associated shortness of breath. She denies any trauma to the leg or fever. At her baseline, the patient uses a walker with some pain with ambulation. She notes she has been taking Tylenol for symptomatic alleviation. The patient states she is taking Eliquis for her atrial fibrillation. She reports she has not missed any doses and she uses supplemental oxygen regularly. The patient states she is currently living in a group home.     PAST MEDICAL HISTORY  Past Medical History[1]    SURGICAL HISTORY  Past Surgical History[2]    FAMILY HISTORY  Family History   Problem Relation Age of Onset    No Known Problems Mother     Cancer Sister        SOCIAL HISTORY   reports that she quit smoking about 2 years ago. Her smoking use included cigarettes. She has never used smokeless tobacco. She reports that she does not currently use alcohol. She reports that she does not use drugs.    CURRENT MEDICATIONS  Current Outpatient Medications   Medication Instructions    albuterol 108 (90 Base) MCG/ACT Aero Soln inhalation aerosol 2  "Puffs, Inhalation, EVERY 6 HOURS PRN    aripiprazole (ABILIFY) 30 mg, Oral, EVERY MORNING    ascorbic acid (VITAMIN C) 500 mg, DAILY    atorvastatin (LIPITOR) 20 mg, Oral, EVERY BEDTIME    cyclobenzaprine (FLEXERIL) 10 mg, Oral, 3 TIMES DAILY PRN    Eliquis 5 mg, Oral, 2 TIMES DAILY    ferrous sulfate 325 mg, EVERY 48 HOURS    fluticasone (FLONASE) 50 MCG/ACT nasal spray 2 Sprays, EVERY MORNING    fluticasone-salmeterol (ADVAIR DISKUS) 100-50 MCG/ACT AEROSOL POWDER, BREATH ACTIVATED 1 Puff, Inhalation, 2 TIMES DAILY    gabapentin (NEURONTIN) 300 mg, Oral, 3 TIMES DAILY    guaiFENesin ER (MUCINEX) 600 mg, Oral, 2 TIMES DAILY PRN    hydrOXYzine HCl (ATARAX) 25 mg, Oral, 3 TIMES DAILY PRN    ketoconazole (NIZORAL) 2 % Cream 1 Application, 2 TIMES DAILY    lidocaine (LIDODERM) 5 % Patch 1 Patch, EVERY 12 HOURS    metoprolol SR (TOPROL XL) 50 mg, Oral, DAILY    montelukast (SINGULAIR) 10 mg, Oral, DAILY    ondansetron (ZOFRAN ODT) 4 mg, Oral, EVERY 6 HOURS PRN    Vitamin D (Cholecalciferol) (CHOLECALCIFEROL) 2,000 Units, Oral, DAILY      ALLERGIES  Amoxicillin and Penicillin g    PHYSICAL EXAM  /51   Pulse 75   Temp 36.2 °C (97.1 °F) (Temporal)   Ht 1.549 m (5' 1\")   Wt (!) 125 kg (276 lb)   LMP  (LMP Unknown)   SpO2 91%   BMI 52.15 kg/m²   Constitutional: Awake and alert  HENT: Normal inspection  Eyes: Normal inspection  Neck: Grossly normal range of motion.  Cardiovascular: Normal heart rate, Normal rhythm.  Symmetric peripheral pulses.   Thorax & Lungs: No respiratory distress, No wheezing, No rales, No rhonchi, No chest tenderness.   Abdomen: Bowel sounds normal, soft, non-distended, nontender, no mass  Skin: No obvious rash.  Back: No tenderness, No CVA tenderness.   Extremities: Bilateral lower leg erythema over the anterior chins. Mild tenderness to palpation over the left anterior and posterior shin. 2+ dorsalis pedis and posterior tibia pulses. 1+ pitting edema in the bilateral lower extremities. "   Neurologic: Grossly normal   Psychiatric: Normal for situation     DIAGNOSTIC STUDIES    EKG/LABS  Labs Reviewed   CBC WITH DIFFERENTIAL - Abnormal; Notable for the following components:       Result Value    WBC 12.3 (*)     Hemoglobin 11.6 (*)     MCH 26.4 (*)     MCHC 30.0 (*)     Neutrophils-Polys 74.80 (*)     Lymphocytes 17.80 (*)     Neutrophils (Absolute) 9.23 (*)     All other components within normal limits   COMP METABOLIC PANEL - Abnormal; Notable for the following components:    Glucose 111 (*)     Alkaline Phosphatase 111 (*)     All other components within normal limits   TROPONIN - Abnormal; Notable for the following components:    Troponin T 38 (*)     All other components within normal limits   PROBRAIN NATRIURETIC PEPTIDE, NT - Abnormal; Notable for the following components:    NT-proBNP 288 (*)     All other components within normal limits   ESTIMATED GFR      Results for orders placed or performed during the hospital encounter of 25   EKG (NOW)   Result Value Ref Range    Report       [facility] Emergency Dept[facility]    Test Date:  2025  Pt Name:    ADELINA MILLAN                Department: ER  MRN:        [id number]                      Room:        03  Gender:     Female                       Technician: [id number]  :        [id number]                   Requested By:CHRIS THORNE  Order #:    [id number]                    Reading MD:    Measurements  Intervals                                Axis  Rate:       70                           P:          25  NH:         156                          QRS:        53  QRSD:       121                          T:          3  QT:         394  QTc:        426    Interpretive Statements  Sinus rhythm  Right bundle branch block  ST elev, probable normal early repol pattern  Compared to ECG 2025 21:25:43  ST (T wave) deviation now present  Atrial premature complex(es) no longer present       *Note: Due to a large number of  results and/or encounters for the requested time period, some results have not been displayed. A complete set of results can be found in Results Review.      I have independently interpreted this EKG.  No ST or T wave changes to suggest acute MI.  Normal sinus rhythm with ventricular rate of 70.  Right bundle branch block.    RADIOLOGY/PROCEDURES   The attending emergency physician has independently interpreted the diagnostic imaging associated with this visit and am waiting the final reading from the radiologist.   My preliminary interpretation is a follows: No acute abnormalities.    Radiologist interpretation:  DX-CHEST-PORTABLE (1 VIEW)   Final Result         1.  Mild pulmonary edema and/or infiltrates.      DX-ANKLE 3+ VIEWS LEFT   Final Result         1.  No acute traumatic bony injury.             COURSE & MEDICAL DECISION MAKING     ASSESSMENT, COURSE AND PLAN  Care Narrative:   ED OBS: No; Patient does not meet criteria for ED Observation.       8:00 PM - Patient seen and examined at bedside.  Patient is a 49-year-old female presenting to the emergency department with bilateral left worse than right lower extremity pain.  Patient has been able to ambulate, denies any recent falls, injuries.  On exam she has some venous stasis changes in her bilateral lower extremities, there is no asymmetric swelling, states that she has been compliant with her blood thinner.  Patient is distally neurovascularly intact, no significant bony tenderness, does have some mild tenderness to palpation over the left distal shin.  No clinical signs of DVT.  Discussed plan of care, including diagnostic lab work and imaging. Patient agrees to the plan of care. The patient will be medicated with Norco 1 tablet. Ordered for CBC with diff, proBNP, Troponin, CMP, DX-Ankle 3+ left, DX-Chest, and EKG to evaluate her symptoms.      Labs reviewed, similar to labs taken 2 days ago, does have a mild leukocytosis, metabolic panel largely normal,  troponin at her baseline, BNP at her baseline, x-ray of her ankle did not show any fractures, low clinical suspicion for DVT as patient is anticoagulated and does not have any asymmetrical limb swelling, doubt acute limb threatening ischemia, necrotizing soft tissue infection.    9:40 PM - I reevaluated the patient at bedside. The patient informs me they feel improved following medication administration. I discussed the patient's diagnostic study results which show no acute abnormalities. The patient reports they are able to ambulate at home and have assistance with their ADLs. I discussed plan for discharge and follow up as outlined below. The patient is stable for discharge at this time and will return for any new or worsening symptoms. Patient verbalizes understanding and support with my plan for discharge.      DISPOSITION AND DISCUSSIONS  I have discussed management of the patient with the following physicians and JAY's:  None    Discussion of management with other QHP or appropriate source(s): None     Escalation of care considered, and ultimately not performed: acute inpatient care management, however at this time, the patient is most appropriate for outpatient management.    Barriers to care at this time, including but not limited to: None.     Decision tools and prescription drugs considered including, but not limited to: None.    The patient will return for new or worsening symptoms and is stable at the time of discharge.    The patient is referred to a primary physician for blood pressure management, diabetic screening, and for all other preventative health concerns.    DISPOSITION:  Patient will be discharged home in stable condition.    FOLLOW UP:  Lili Reddy M.D.  [address]  [address]  [facility] [address]  [phone]    Schedule an appointment as soon as possible for a visit       FINAL DIANGOSIS  1. Pain of left lower leg Acute     ISarah (Scribe), am scribing for, and in the  presence of, Jasper Hernandez M.D..    Electronically signed by: Sarah Bonilla (Scribe), 7/9/2025    I, Jasper Hernandez M.D. personally performed the services described in this documentation, as scribed by Sarah Bonilla in my presence, and it is both accurate and complete.      The note accurately reflects work and decisions made by me.  Jasper Hernandez M.D.  7/10/2025  4:03 AM         [1]   Past Medical History:  Diagnosis Date    A-fib (HCC)     MONI (acute kidney injury) (HCC) 08/09/2023    Asthma     Bipolar 2 disorder (HCC)     Chickenpox     Chronic obstructive pulmonary disease (HCC)     Congestive heart failure (HCC)     Hypertension     On home oxygen therapy     Other psychological stress     Schizophrenic disorder (HCC)     Yeast infection of the skin 04/01/2021   [2]   Past Surgical History:  Procedure Laterality Date    ORIF, ANKLE Left 11/2/2024    Procedure: ORIF, ANKLE- TALUS;  Surgeon: Roman Rosa M.D.;  Location: SURGERY [facility];  Service: Orthopedics    CHOLECYSTECTOMY      COLECTOMY      HYSTERECTOMY LAPAROSCOPY      KNEE ARTHROSCOPY Left

## 2025-07-10 NOTE — DISCHARGE INSTRUCTIONS
You were seen in the emergency department for your leg pain.  Here there is no signs of any active infection.  You are currently anticoagulated, there is no clinical signs of DVT.  You did get an x-ray here that did not show any evidence of fracture.  Your labs here are largely consistent with your baseline.  You received some medications here with some improvement in your symptoms.  I do recommend continuing to take your home medications, following up with your primary care doctor, please return to the emergency department for any new or worsening symptoms.

## 2025-07-10 NOTE — ED TRIAGE NOTES
Chief Complaint   Patient presents with    Leg Pain     Chronic BLE swelling and pain. Pt is cognitively delayed and lives at group home.      Pt BIB EMS for above. CSM intact at this time. FSBS 119 per EMS. Pt received 600 IBU prior to arrival and then took tylenol at home prior to EMS arrival.    Patient: Shelton Puentes    Procedure(s):  COLONOSCOPY, WITH POLYPECTOMY AND BIOPSY  Hemorrhoidectomy banding    Diagnosis: Rectal bleeding [K62.5]  Diagnosis Additional Information: No value filed.    Anesthesia Type:   MAC     Note:  Airway :Room Air  Patient transferred to:Phase II  Handoff Report: Identifed the Patient, Identified the Reponsible Provider, Reviewed the pertinent medical history, Discussed the surgical course, Reviewed Intra-OP anesthesia mangement and issues during anesthesia, Set expectations for post-procedure period and Allowed opportunity for questions and acknowledgement of understanding      Vitals: (Last set prior to Anesthesia Care Transfer)    CRNA VITALS  1/27/2020 0941 - 1/27/2020 1014      1/27/2020             Pulse:  72    Ht Rate:  72    SpO2:  97 %    Resp Rate (set):  10                Electronically Signed By: CLAUDIA GUY CRNA  January 27, 2020  10:14 AM

## 2025-07-10 NOTE — ED NOTES
Patient resting comfortably on gurney with even and unlabored respirations. Call light in  reach. No needs at this time.

## 2025-07-11 ENCOUNTER — HOSPITAL ENCOUNTER (OUTPATIENT)
Facility: MEDICAL CENTER | Age: 50
End: 2025-07-12
Attending: EMERGENCY MEDICINE | Admitting: STUDENT IN AN ORGANIZED HEALTH CARE EDUCATION/TRAINING PROGRAM
Payer: MEDICAID

## 2025-07-11 ENCOUNTER — APPOINTMENT (OUTPATIENT)
Dept: RADIOLOGY | Facility: MEDICAL CENTER | Age: 50
End: 2025-07-11
Attending: EMERGENCY MEDICINE
Payer: MEDICAID

## 2025-07-11 DIAGNOSIS — R79.89 ELEVATED BRAIN NATRIURETIC PEPTIDE (BNP) LEVEL: ICD-10-CM

## 2025-07-11 DIAGNOSIS — R07.9 CHEST PAIN, UNSPECIFIED TYPE: Primary | ICD-10-CM

## 2025-07-11 PROBLEM — G62.9 NEUROPATHY: Status: ACTIVE | Noted: 2025-07-11

## 2025-07-11 PROBLEM — F39 MOOD DISORDER (HCC): Status: ACTIVE | Noted: 2025-07-11

## 2025-07-11 LAB
ALBUMIN SERPL BCP-MCNC: 3.9 G/DL (ref 3.2–4.9)
ALBUMIN/GLOB SERPL: 1.3 G/DL
ALP SERPL-CCNC: 106 U/L (ref 30–99)
ALT SERPL-CCNC: 9 U/L (ref 2–50)
ANION GAP SERPL CALC-SCNC: 10 MMOL/L (ref 7–16)
AST SERPL-CCNC: 14 U/L (ref 12–45)
BASOPHILS # BLD AUTO: 0.2 % (ref 0–1.8)
BASOPHILS # BLD: 0.02 K/UL (ref 0–0.12)
BILIRUB SERPL-MCNC: 0.6 MG/DL (ref 0.1–1.5)
BUN SERPL-MCNC: 15 MG/DL (ref 8–22)
CALCIUM ALBUM COR SERPL-MCNC: 9.4 MG/DL (ref 8.5–10.5)
CALCIUM SERPL-MCNC: 9.3 MG/DL (ref 8.5–10.5)
CHLORIDE SERPL-SCNC: 106 MMOL/L (ref 96–112)
CO2 SERPL-SCNC: 29 MMOL/L (ref 20–33)
CREAT SERPL-MCNC: 1 MG/DL (ref 0.5–1.4)
EKG IMPRESSION: NORMAL
EOSINOPHIL # BLD AUTO: 0.1 K/UL (ref 0–0.51)
EOSINOPHIL NFR BLD: 1 % (ref 0–6.9)
ERYTHROCYTE [DISTWIDTH] IN BLOOD BY AUTOMATED COUNT: 50.4 FL (ref 35.9–50)
GFR SERPLBLD CREATININE-BSD FMLA CKD-EPI: 68 ML/MIN/1.73 M 2
GLOBULIN SER CALC-MCNC: 2.9 G/DL (ref 1.9–3.5)
GLUCOSE SERPL-MCNC: 117 MG/DL (ref 65–99)
HCT VFR BLD AUTO: 37.8 % (ref 37–47)
HGB BLD-MCNC: 11.4 G/DL (ref 12–16)
IMM GRANULOCYTES # BLD AUTO: 0.05 K/UL (ref 0–0.11)
IMM GRANULOCYTES NFR BLD AUTO: 0.5 % (ref 0–0.9)
LYMPHOCYTES # BLD AUTO: 2.03 K/UL (ref 1–4.8)
LYMPHOCYTES NFR BLD: 20.9 % (ref 22–41)
MCH RBC QN AUTO: 26.2 PG (ref 27–33)
MCHC RBC AUTO-ENTMCNC: 30.2 G/DL (ref 32.2–35.5)
MCV RBC AUTO: 86.9 FL (ref 81.4–97.8)
MONOCYTES # BLD AUTO: 0.6 K/UL (ref 0–0.85)
MONOCYTES NFR BLD AUTO: 6.2 % (ref 0–13.4)
NEUTROPHILS # BLD AUTO: 6.92 K/UL (ref 1.82–7.42)
NEUTROPHILS NFR BLD: 71.2 % (ref 44–72)
NRBC # BLD AUTO: 0 K/UL
NRBC BLD-RTO: 0 /100 WBC (ref 0–0.2)
PLATELET # BLD AUTO: 196 K/UL (ref 164–446)
PMV BLD AUTO: 11 FL (ref 9–12.9)
POTASSIUM SERPL-SCNC: 4.3 MMOL/L (ref 3.6–5.5)
PROT SERPL-MCNC: 6.8 G/DL (ref 6–8.2)
RBC # BLD AUTO: 4.35 M/UL (ref 4.2–5.4)
SODIUM SERPL-SCNC: 145 MMOL/L (ref 135–145)
TROPONIN T SERPL-MCNC: 45 NG/L (ref 6–19)
WBC # BLD AUTO: 9.7 K/UL (ref 4.8–10.8)

## 2025-07-11 PROCEDURE — 71045 X-RAY EXAM CHEST 1 VIEW: CPT

## 2025-07-11 PROCEDURE — 99223 1ST HOSP IP/OBS HIGH 75: CPT | Performed by: STUDENT IN AN ORGANIZED HEALTH CARE EDUCATION/TRAINING PROGRAM

## 2025-07-11 PROCEDURE — 85025 COMPLETE CBC W/AUTO DIFF WBC: CPT

## 2025-07-11 PROCEDURE — 93005 ELECTROCARDIOGRAM TRACING: CPT | Mod: TC | Performed by: EMERGENCY MEDICINE

## 2025-07-11 PROCEDURE — 80053 COMPREHEN METABOLIC PANEL: CPT

## 2025-07-11 PROCEDURE — 84484 ASSAY OF TROPONIN QUANT: CPT

## 2025-07-11 PROCEDURE — 99285 EMERGENCY DEPT VISIT HI MDM: CPT

## 2025-07-11 PROCEDURE — 93005 ELECTROCARDIOGRAM TRACING: CPT | Mod: TC

## 2025-07-11 PROCEDURE — G0378 HOSPITAL OBSERVATION PER HR: HCPCS

## 2025-07-11 PROCEDURE — 700111 HCHG RX REV CODE 636 W/ 250 OVERRIDE (IP): Mod: JZ,UD | Performed by: EMERGENCY MEDICINE

## 2025-07-11 PROCEDURE — 96375 TX/PRO/DX INJ NEW DRUG ADDON: CPT

## 2025-07-11 PROCEDURE — 96374 THER/PROPH/DIAG INJ IV PUSH: CPT

## 2025-07-11 PROCEDURE — 36415 COLL VENOUS BLD VENIPUNCTURE: CPT

## 2025-07-11 RX ORDER — FERROUS SULFATE 325(65) MG
325 TABLET ORAL
Status: DISCONTINUED | OUTPATIENT
Start: 2025-07-12 | End: 2025-07-12 | Stop reason: HOSPADM

## 2025-07-11 RX ORDER — NITROGLYCERIN 0.4 MG/1
0.4 TABLET SUBLINGUAL
Status: DISCONTINUED | OUTPATIENT
Start: 2025-07-11 | End: 2025-07-12 | Stop reason: HOSPADM

## 2025-07-11 RX ORDER — HYDROXYZINE HYDROCHLORIDE 25 MG/1
25 TABLET, FILM COATED ORAL 3 TIMES DAILY PRN
Status: DISCONTINUED | OUTPATIENT
Start: 2025-07-11 | End: 2025-07-12 | Stop reason: HOSPADM

## 2025-07-11 RX ORDER — HYDROMORPHONE HYDROCHLORIDE 1 MG/ML
0.5 INJECTION, SOLUTION INTRAMUSCULAR; INTRAVENOUS; SUBCUTANEOUS
Status: DISCONTINUED | OUTPATIENT
Start: 2025-07-11 | End: 2025-07-12 | Stop reason: HOSPADM

## 2025-07-11 RX ORDER — FUROSEMIDE 10 MG/ML
40 INJECTION INTRAMUSCULAR; INTRAVENOUS 2 TIMES DAILY
Status: DISCONTINUED | OUTPATIENT
Start: 2025-07-12 | End: 2025-07-12 | Stop reason: HOSPADM

## 2025-07-11 RX ORDER — SODIUM CHLORIDE, SODIUM LACTATE, POTASSIUM CHLORIDE, AND CALCIUM CHLORIDE .6; .31; .03; .02 G/100ML; G/100ML; G/100ML; G/100ML
500 INJECTION, SOLUTION INTRAVENOUS
Status: DISCONTINUED | OUTPATIENT
Start: 2025-07-11 | End: 2025-07-12 | Stop reason: HOSPADM

## 2025-07-11 RX ORDER — PROMETHAZINE HYDROCHLORIDE 25 MG/1
12.5-25 SUPPOSITORY RECTAL EVERY 4 HOURS PRN
Status: DISCONTINUED | OUTPATIENT
Start: 2025-07-11 | End: 2025-07-12 | Stop reason: HOSPADM

## 2025-07-11 RX ORDER — PROMETHAZINE HYDROCHLORIDE 25 MG/1
12.5-25 TABLET ORAL EVERY 4 HOURS PRN
Status: DISCONTINUED | OUTPATIENT
Start: 2025-07-11 | End: 2025-07-12 | Stop reason: HOSPADM

## 2025-07-11 RX ORDER — METOPROLOL SUCCINATE 50 MG/1
50 TABLET, EXTENDED RELEASE ORAL DAILY
Status: DISCONTINUED | OUTPATIENT
Start: 2025-07-12 | End: 2025-07-12 | Stop reason: HOSPADM

## 2025-07-11 RX ORDER — ONDANSETRON 2 MG/ML
4 INJECTION INTRAMUSCULAR; INTRAVENOUS EVERY 4 HOURS PRN
Status: DISCONTINUED | OUTPATIENT
Start: 2025-07-11 | End: 2025-07-12 | Stop reason: HOSPADM

## 2025-07-11 RX ORDER — OXYCODONE HYDROCHLORIDE 5 MG/1
5 TABLET ORAL
Refills: 0 | Status: DISCONTINUED | OUTPATIENT
Start: 2025-07-11 | End: 2025-07-12 | Stop reason: HOSPADM

## 2025-07-11 RX ORDER — VITAMIN B COMPLEX
2000 TABLET ORAL DAILY
Status: DISCONTINUED | OUTPATIENT
Start: 2025-07-12 | End: 2025-07-12 | Stop reason: HOSPADM

## 2025-07-11 RX ORDER — FLUTICASONE PROPIONATE 50 MCG
2 SPRAY, SUSPENSION (ML) NASAL EVERY MORNING
Status: DISCONTINUED | OUTPATIENT
Start: 2025-07-12 | End: 2025-07-12

## 2025-07-11 RX ORDER — ACETAMINOPHEN 325 MG/1
650 TABLET ORAL EVERY 6 HOURS
Status: DISCONTINUED | OUTPATIENT
Start: 2025-07-12 | End: 2025-07-12 | Stop reason: HOSPADM

## 2025-07-11 RX ORDER — ASCORBIC ACID 500 MG
500 TABLET ORAL DAILY
Status: DISCONTINUED | OUTPATIENT
Start: 2025-07-12 | End: 2025-07-12 | Stop reason: HOSPADM

## 2025-07-11 RX ORDER — IBUPROFEN 800 MG/1
800 TABLET, FILM COATED ORAL 3 TIMES DAILY PRN
Status: DISCONTINUED | OUTPATIENT
Start: 2025-07-15 | End: 2025-07-12 | Stop reason: HOSPADM

## 2025-07-11 RX ORDER — MORPHINE SULFATE 4 MG/ML
4 INJECTION INTRAVENOUS ONCE
Status: COMPLETED | OUTPATIENT
Start: 2025-07-11 | End: 2025-07-11

## 2025-07-11 RX ORDER — ONDANSETRON 4 MG/1
4 TABLET, ORALLY DISINTEGRATING ORAL EVERY 4 HOURS PRN
Status: DISCONTINUED | OUTPATIENT
Start: 2025-07-11 | End: 2025-07-12 | Stop reason: HOSPADM

## 2025-07-11 RX ORDER — POLYETHYLENE GLYCOL 3350 17 G/17G
1 POWDER, FOR SOLUTION ORAL
Status: DISCONTINUED | OUTPATIENT
Start: 2025-07-11 | End: 2025-07-12 | Stop reason: HOSPADM

## 2025-07-11 RX ORDER — CYCLOBENZAPRINE HCL 10 MG
10 TABLET ORAL 3 TIMES DAILY PRN
Status: DISCONTINUED | OUTPATIENT
Start: 2025-07-11 | End: 2025-07-12

## 2025-07-11 RX ORDER — ARIPIPRAZOLE 15 MG/1
30 TABLET ORAL EVERY MORNING
Status: DISCONTINUED | OUTPATIENT
Start: 2025-07-12 | End: 2025-07-12 | Stop reason: HOSPADM

## 2025-07-11 RX ORDER — FLUTICASONE PROPIONATE AND SALMETEROL 100; 50 UG/1; UG/1
1 POWDER RESPIRATORY (INHALATION) 2 TIMES DAILY
Status: DISCONTINUED | OUTPATIENT
Start: 2025-07-11 | End: 2025-07-12

## 2025-07-11 RX ORDER — GUAIFENESIN 600 MG/1
600 TABLET, EXTENDED RELEASE ORAL 2 TIMES DAILY PRN
Status: DISCONTINUED | OUTPATIENT
Start: 2025-07-11 | End: 2025-07-12

## 2025-07-11 RX ORDER — ONDANSETRON 2 MG/ML
4 INJECTION INTRAMUSCULAR; INTRAVENOUS ONCE
Status: COMPLETED | OUTPATIENT
Start: 2025-07-11 | End: 2025-07-11

## 2025-07-11 RX ORDER — ALBUTEROL SULFATE 90 UG/1
2 INHALANT RESPIRATORY (INHALATION) EVERY 6 HOURS PRN
Status: DISCONTINUED | OUTPATIENT
Start: 2025-07-11 | End: 2025-07-12 | Stop reason: HOSPADM

## 2025-07-11 RX ORDER — MONTELUKAST SODIUM 10 MG/1
10 TABLET ORAL DAILY
Status: DISCONTINUED | OUTPATIENT
Start: 2025-07-12 | End: 2025-07-12

## 2025-07-11 RX ORDER — OMEPRAZOLE 20 MG/1
20 CAPSULE, DELAYED RELEASE ORAL 2 TIMES DAILY
Status: DISCONTINUED | OUTPATIENT
Start: 2025-07-12 | End: 2025-07-12 | Stop reason: HOSPADM

## 2025-07-11 RX ORDER — HYDRALAZINE HYDROCHLORIDE 20 MG/ML
10 INJECTION INTRAMUSCULAR; INTRAVENOUS EVERY 4 HOURS PRN
Status: DISCONTINUED | OUTPATIENT
Start: 2025-07-11 | End: 2025-07-12 | Stop reason: HOSPADM

## 2025-07-11 RX ORDER — ACETAMINOPHEN 325 MG/1
650 TABLET ORAL EVERY 6 HOURS PRN
Status: DISCONTINUED | OUTPATIENT
Start: 2025-07-17 | End: 2025-07-12 | Stop reason: HOSPADM

## 2025-07-11 RX ORDER — GABAPENTIN 300 MG/1
300 CAPSULE ORAL 3 TIMES DAILY
Status: DISCONTINUED | OUTPATIENT
Start: 2025-07-12 | End: 2025-07-12 | Stop reason: HOSPADM

## 2025-07-11 RX ORDER — ATORVASTATIN CALCIUM 40 MG/1
40 TABLET, FILM COATED ORAL
Status: DISCONTINUED | OUTPATIENT
Start: 2025-07-12 | End: 2025-07-12 | Stop reason: HOSPADM

## 2025-07-11 RX ORDER — PROCHLORPERAZINE EDISYLATE 5 MG/ML
5-10 INJECTION INTRAMUSCULAR; INTRAVENOUS EVERY 4 HOURS PRN
Status: DISCONTINUED | OUTPATIENT
Start: 2025-07-11 | End: 2025-07-12 | Stop reason: HOSPADM

## 2025-07-11 RX ORDER — OXYCODONE HYDROCHLORIDE 10 MG/1
10 TABLET ORAL
Refills: 0 | Status: DISCONTINUED | OUTPATIENT
Start: 2025-07-11 | End: 2025-07-12 | Stop reason: HOSPADM

## 2025-07-11 RX ORDER — AMOXICILLIN 250 MG
2 CAPSULE ORAL EVERY EVENING
Status: DISCONTINUED | OUTPATIENT
Start: 2025-07-12 | End: 2025-07-12 | Stop reason: HOSPADM

## 2025-07-11 RX ORDER — ACETAMINOPHEN 325 MG/1
650 TABLET ORAL EVERY 6 HOURS PRN
Status: DISCONTINUED | OUTPATIENT
Start: 2025-07-11 | End: 2025-07-11

## 2025-07-11 RX ORDER — KETOROLAC TROMETHAMINE 15 MG/ML
15 INJECTION, SOLUTION INTRAMUSCULAR; INTRAVENOUS EVERY 6 HOURS
Status: DISCONTINUED | OUTPATIENT
Start: 2025-07-12 | End: 2025-07-12 | Stop reason: HOSPADM

## 2025-07-11 RX ADMIN — MORPHINE SULFATE 4 MG: 4 INJECTION, SOLUTION INTRAMUSCULAR; INTRAVENOUS at 21:52

## 2025-07-11 RX ADMIN — ONDANSETRON 4 MG: 2 INJECTION INTRAMUSCULAR; INTRAVENOUS at 21:52

## 2025-07-11 ASSESSMENT — ENCOUNTER SYMPTOMS
DIZZINESS: 0
COUGH: 0
BRUISES/BLEEDS EASILY: 0
VOMITING: 0
BLURRED VISION: 0
ABDOMINAL PAIN: 0
PALPITATIONS: 0
HEARTBURN: 0
CHILLS: 0
FEVER: 0
DEPRESSION: 0
WEAKNESS: 1
HEADACHES: 0
SHORTNESS OF BREATH: 1
MYALGIAS: 1
DOUBLE VISION: 0
NAUSEA: 0

## 2025-07-11 ASSESSMENT — FIBROSIS 4 INDEX: FIB4 SCORE: 0.77

## 2025-07-11 ASSESSMENT — LIFESTYLE VARIABLES: SUBSTANCE_ABUSE: 0

## 2025-07-12 ENCOUNTER — PHARMACY VISIT (OUTPATIENT)
Dept: PHARMACY | Facility: MEDICAL CENTER | Age: 50
End: 2025-07-12
Payer: COMMERCIAL

## 2025-07-12 ENCOUNTER — APPOINTMENT (OUTPATIENT)
Dept: RADIOLOGY | Facility: MEDICAL CENTER | Age: 50
End: 2025-07-12
Attending: STUDENT IN AN ORGANIZED HEALTH CARE EDUCATION/TRAINING PROGRAM
Payer: MEDICAID

## 2025-07-12 VITALS
OXYGEN SATURATION: 94 % | SYSTOLIC BLOOD PRESSURE: 109 MMHG | HEIGHT: 62 IN | TEMPERATURE: 96.7 F | DIASTOLIC BLOOD PRESSURE: 58 MMHG | WEIGHT: 284.17 LBS | BODY MASS INDEX: 52.29 KG/M2 | RESPIRATION RATE: 20 BRPM | HEART RATE: 48 BPM

## 2025-07-12 LAB
ALBUMIN SERPL BCP-MCNC: 3.6 G/DL (ref 3.2–4.9)
ALBUMIN/GLOB SERPL: 1.2 G/DL
ALP SERPL-CCNC: 96 U/L (ref 30–99)
ALT SERPL-CCNC: 9 U/L (ref 2–50)
AMPHET UR QL SCN: NEGATIVE
ANION GAP SERPL CALC-SCNC: 9 MMOL/L (ref 7–16)
APPEARANCE UR: ABNORMAL
AST SERPL-CCNC: 14 U/L (ref 12–45)
BACTERIA #/AREA URNS HPF: ABNORMAL /HPF
BARBITURATES UR QL SCN: NEGATIVE
BENZODIAZ UR QL SCN: NEGATIVE
BILIRUB SERPL-MCNC: 0.6 MG/DL (ref 0.1–1.5)
BILIRUB UR QL STRIP.AUTO: NEGATIVE
BUN SERPL-MCNC: 19 MG/DL (ref 8–22)
BZE UR QL SCN: NEGATIVE
CALCIUM ALBUM COR SERPL-MCNC: 9.1 MG/DL (ref 8.5–10.5)
CALCIUM SERPL-MCNC: 8.8 MG/DL (ref 8.5–10.5)
CANNABINOIDS UR QL SCN: NEGATIVE
CASTS URNS QL MICRO: ABNORMAL /LPF (ref 0–2)
CHLORIDE SERPL-SCNC: 107 MMOL/L (ref 96–112)
CHOLEST SERPL-MCNC: 110 MG/DL (ref 100–199)
CO2 SERPL-SCNC: 26 MMOL/L (ref 20–33)
COLOR UR: ABNORMAL
CREAT SERPL-MCNC: 1.14 MG/DL (ref 0.5–1.4)
EPITHELIAL CELLS 1715: ABNORMAL /HPF (ref 0–5)
ERYTHROCYTE [DISTWIDTH] IN BLOOD BY AUTOMATED COUNT: 51.9 FL (ref 35.9–50)
EST. AVERAGE GLUCOSE BLD GHB EST-MCNC: 126 MG/DL
FENTANYL UR QL: NEGATIVE
GFR SERPLBLD CREATININE-BSD FMLA CKD-EPI: 58 ML/MIN/1.73 M 2
GLOBULIN SER CALC-MCNC: 2.9 G/DL (ref 1.9–3.5)
GLUCOSE BLD STRIP.AUTO-MCNC: 132 MG/DL (ref 65–99)
GLUCOSE BLD STRIP.AUTO-MCNC: 80 MG/DL (ref 65–99)
GLUCOSE BLD STRIP.AUTO-MCNC: 94 MG/DL (ref 65–99)
GLUCOSE SERPL-MCNC: 93 MG/DL (ref 65–99)
GLUCOSE UR STRIP.AUTO-MCNC: NEGATIVE MG/DL
HBA1C MFR BLD: 6 % (ref 4–5.6)
HCT VFR BLD AUTO: 36.3 % (ref 37–47)
HDLC SERPL-MCNC: 48 MG/DL
HGB BLD-MCNC: 10.9 G/DL (ref 12–16)
KETONES UR STRIP.AUTO-MCNC: ABNORMAL MG/DL
LDLC SERPL CALC-MCNC: 43 MG/DL
LEUKOCYTE ESTERASE UR QL STRIP.AUTO: NEGATIVE
MCH RBC QN AUTO: 26.7 PG (ref 27–33)
MCHC RBC AUTO-ENTMCNC: 30 G/DL (ref 32.2–35.5)
MCV RBC AUTO: 89 FL (ref 81.4–97.8)
METHADONE UR QL SCN: NEGATIVE
MICRO URNS: ABNORMAL
MUCOUS THREADS URNS QL MICRO: PRESENT /HPF
NITRITE UR QL STRIP.AUTO: NEGATIVE
OPIATES UR QL SCN: POSITIVE
OXYCODONE UR QL SCN: NEGATIVE
PCP UR QL SCN: NEGATIVE
PH UR STRIP.AUTO: 5 [PH] (ref 5–8)
PLATELET # BLD AUTO: 175 K/UL (ref 164–446)
PMV BLD AUTO: 11.5 FL (ref 9–12.9)
POTASSIUM SERPL-SCNC: 5.1 MMOL/L (ref 3.6–5.5)
PROCALCITONIN SERPL-MCNC: 0.06 NG/ML
PROPOXYPH UR QL SCN: NEGATIVE
PROT SERPL-MCNC: 6.5 G/DL (ref 6–8.2)
PROT UR QL STRIP: 100 MG/DL
RBC # BLD AUTO: 4.08 M/UL (ref 4.2–5.4)
RBC # URNS HPF: ABNORMAL /HPF (ref 0–2)
RBC UR QL AUTO: NEGATIVE
SODIUM SERPL-SCNC: 142 MMOL/L (ref 135–145)
SP GR UR STRIP.AUTO: 1.04
TRIGL SERPL-MCNC: 93 MG/DL (ref 0–149)
TROPONIN T SERPL-MCNC: 45 NG/L (ref 6–19)
TROPONIN T SERPL-MCNC: 49 NG/L (ref 6–19)
UROBILINOGEN UR STRIP.AUTO-MCNC: 1 EU/DL
WBC # BLD AUTO: 8.3 K/UL (ref 4.8–10.8)
WBC #/AREA URNS HPF: ABNORMAL /HPF

## 2025-07-12 PROCEDURE — 96375 TX/PRO/DX INJ NEW DRUG ADDON: CPT | Mod: XU

## 2025-07-12 PROCEDURE — 81001 URINALYSIS AUTO W/SCOPE: CPT | Mod: XU

## 2025-07-12 PROCEDURE — 83036 HEMOGLOBIN GLYCOSYLATED A1C: CPT

## 2025-07-12 PROCEDURE — 85027 COMPLETE CBC AUTOMATED: CPT

## 2025-07-12 PROCEDURE — 84484 ASSAY OF TROPONIN QUANT: CPT | Mod: 91

## 2025-07-12 PROCEDURE — 96376 TX/PRO/DX INJ SAME DRUG ADON: CPT | Mod: XU

## 2025-07-12 PROCEDURE — 80307 DRUG TEST PRSMV CHEM ANLYZR: CPT

## 2025-07-12 PROCEDURE — 78452 HT MUSCLE IMAGE SPECT MULT: CPT

## 2025-07-12 PROCEDURE — 82962 GLUCOSE BLOOD TEST: CPT | Performed by: STUDENT IN AN ORGANIZED HEALTH CARE EDUCATION/TRAINING PROGRAM

## 2025-07-12 PROCEDURE — RXMED WILLOW AMBULATORY MEDICATION CHARGE

## 2025-07-12 PROCEDURE — 700111 HCHG RX REV CODE 636 W/ 250 OVERRIDE (IP): Mod: JZ,UD | Performed by: STUDENT IN AN ORGANIZED HEALTH CARE EDUCATION/TRAINING PROGRAM

## 2025-07-12 PROCEDURE — 700102 HCHG RX REV CODE 250 W/ 637 OVERRIDE(OP): Mod: UD | Performed by: STUDENT IN AN ORGANIZED HEALTH CARE EDUCATION/TRAINING PROGRAM

## 2025-07-12 PROCEDURE — G0378 HOSPITAL OBSERVATION PER HR: HCPCS

## 2025-07-12 PROCEDURE — A9270 NON-COVERED ITEM OR SERVICE: HCPCS | Mod: UD | Performed by: STUDENT IN AN ORGANIZED HEALTH CARE EDUCATION/TRAINING PROGRAM

## 2025-07-12 PROCEDURE — 82962 GLUCOSE BLOOD TEST: CPT | Performed by: HOSPITALIST

## 2025-07-12 PROCEDURE — 80061 LIPID PANEL: CPT

## 2025-07-12 PROCEDURE — 700111 HCHG RX REV CODE 636 W/ 250 OVERRIDE (IP): Mod: UD

## 2025-07-12 PROCEDURE — 99239 HOSP IP/OBS DSCHRG MGMT >30: CPT | Performed by: HOSPITALIST

## 2025-07-12 PROCEDURE — 36415 COLL VENOUS BLD VENIPUNCTURE: CPT

## 2025-07-12 PROCEDURE — 80053 COMPREHEN METABOLIC PANEL: CPT

## 2025-07-12 PROCEDURE — 84145 PROCALCITONIN (PCT): CPT

## 2025-07-12 RX ORDER — REGADENOSON 0.08 MG/ML
0.4 INJECTION, SOLUTION INTRAVENOUS ONCE
Status: COMPLETED | OUTPATIENT
Start: 2025-07-12 | End: 2025-07-12

## 2025-07-12 RX ORDER — DEXTROSE MONOHYDRATE 25 G/50ML
25 INJECTION, SOLUTION INTRAVENOUS
Status: DISCONTINUED | OUTPATIENT
Start: 2025-07-12 | End: 2025-07-12 | Stop reason: HOSPADM

## 2025-07-12 RX ORDER — FUROSEMIDE 20 MG/1
20 TABLET ORAL 2 TIMES DAILY
Qty: 10 TABLET | Refills: 0 | Status: SHIPPED | OUTPATIENT
Start: 2025-07-12 | End: 2025-07-17

## 2025-07-12 RX ORDER — AMINOPHYLLINE 25 MG/ML
100 INJECTION, SOLUTION INTRAVENOUS
Status: DISCONTINUED | OUTPATIENT
Start: 2025-07-12 | End: 2025-07-12 | Stop reason: HOSPADM

## 2025-07-12 RX ORDER — REGADENOSON 0.08 MG/ML
INJECTION, SOLUTION INTRAVENOUS
Status: COMPLETED
Start: 2025-07-12 | End: 2025-07-12

## 2025-07-12 RX ORDER — INSULIN LISPRO 100 [IU]/ML
1-6 INJECTION, SOLUTION INTRAVENOUS; SUBCUTANEOUS
Status: DISCONTINUED | OUTPATIENT
Start: 2025-07-12 | End: 2025-07-12 | Stop reason: HOSPADM

## 2025-07-12 RX ADMIN — FERROUS SULFATE TAB 325 MG (65 MG ELEMENTAL FE) 325 MG: 325 (65 FE) TAB at 06:06

## 2025-07-12 RX ADMIN — APIXABAN 5 MG: 5 TABLET, FILM COATED ORAL at 06:05

## 2025-07-12 RX ADMIN — ARIPIPRAZOLE 30 MG: 15 TABLET ORAL at 06:06

## 2025-07-12 RX ADMIN — KETOROLAC TROMETHAMINE 15 MG: 15 INJECTION, SOLUTION INTRAMUSCULAR; INTRAVENOUS at 18:18

## 2025-07-12 RX ADMIN — MOMETASONE FUROATE AND FORMOTEROL FUMARATE DIHYDRATE 2 PUFF: 100; 5 AEROSOL RESPIRATORY (INHALATION) at 11:05

## 2025-07-12 RX ADMIN — GABAPENTIN 300 MG: 300 CAPSULE ORAL at 11:10

## 2025-07-12 RX ADMIN — FUROSEMIDE 40 MG: 10 INJECTION, SOLUTION INTRAVENOUS at 18:18

## 2025-07-12 RX ADMIN — Medication 2000 UNITS: at 06:06

## 2025-07-12 RX ADMIN — ACETAMINOPHEN 650 MG: 325 TABLET ORAL at 11:11

## 2025-07-12 RX ADMIN — KETOROLAC TROMETHAMINE 15 MG: 15 INJECTION, SOLUTION INTRAMUSCULAR; INTRAVENOUS at 11:09

## 2025-07-12 RX ADMIN — ONDANSETRON 4 MG: 2 INJECTION INTRAMUSCULAR; INTRAVENOUS at 18:26

## 2025-07-12 RX ADMIN — KETOROLAC TROMETHAMINE 15 MG: 15 INJECTION, SOLUTION INTRAMUSCULAR; INTRAVENOUS at 06:07

## 2025-07-12 RX ADMIN — REGADENOSON 0.4 MG: 0.08 INJECTION, SOLUTION INTRAVENOUS at 15:46

## 2025-07-12 RX ADMIN — GABAPENTIN 300 MG: 300 CAPSULE ORAL at 06:06

## 2025-07-12 RX ADMIN — OXYCODONE HYDROCHLORIDE AND ACETAMINOPHEN 500 MG: 500 TABLET ORAL at 06:06

## 2025-07-12 RX ADMIN — ATORVASTATIN CALCIUM 40 MG: 40 TABLET, FILM COATED ORAL at 02:01

## 2025-07-12 RX ADMIN — GABAPENTIN 300 MG: 300 CAPSULE ORAL at 18:18

## 2025-07-12 RX ADMIN — ACETAMINOPHEN 650 MG: 325 TABLET ORAL at 18:17

## 2025-07-12 RX ADMIN — KETOROLAC TROMETHAMINE 15 MG: 15 INJECTION, SOLUTION INTRAMUSCULAR; INTRAVENOUS at 02:01

## 2025-07-12 RX ADMIN — MOMETASONE FUROATE AND FORMOTEROL FUMARATE DIHYDRATE 2 PUFF: 100; 5 AEROSOL RESPIRATORY (INHALATION) at 19:26

## 2025-07-12 RX ADMIN — APIXABAN 5 MG: 5 TABLET, FILM COATED ORAL at 18:18

## 2025-07-12 RX ADMIN — OMEPRAZOLE 20 MG: 20 CAPSULE, DELAYED RELEASE ORAL at 18:18

## 2025-07-12 RX ADMIN — OMEPRAZOLE 20 MG: 20 CAPSULE, DELAYED RELEASE ORAL at 06:06

## 2025-07-12 RX ADMIN — ACETAMINOPHEN 650 MG: 325 TABLET ORAL at 02:01

## 2025-07-12 ASSESSMENT — COGNITIVE AND FUNCTIONAL STATUS - GENERAL
SUGGESTED CMS G CODE MODIFIER DAILY ACTIVITY: CJ
STANDING UP FROM CHAIR USING ARMS: A LITTLE
SUGGESTED CMS G CODE MODIFIER MOBILITY: CK
DRESSING REGULAR UPPER BODY CLOTHING: A LITTLE
CLIMB 3 TO 5 STEPS WITH RAILING: A LOT
MOVING FROM LYING ON BACK TO SITTING ON SIDE OF FLAT BED: A LITTLE
TURNING FROM BACK TO SIDE WHILE IN FLAT BAD: A LITTLE
DRESSING REGULAR LOWER BODY CLOTHING: A LITTLE
TOILETING: A LITTLE
WALKING IN HOSPITAL ROOM: A LITTLE
MOVING TO AND FROM BED TO CHAIR: A LITTLE
HELP NEEDED FOR BATHING: A LITTLE
DAILY ACTIVITIY SCORE: 20
MOBILITY SCORE: 17

## 2025-07-12 ASSESSMENT — CHA2DS2 SCORE
SEX: FEMALE
AGE 75 OR GREATER: NO
CHA2DS2 VASC SCORE: 3
CHF OR LEFT VENTRICULAR DYSFUNCTION: YES
HYPERTENSION: NO
DIABETES: NO
PRIOR STROKE OR TIA OR THROMBOEMBOLISM: NO
VASCULAR DISEASE: YES
AGE 65 TO 74: NO

## 2025-07-12 ASSESSMENT — SOCIAL DETERMINANTS OF HEALTH (SDOH)
WITHIN THE LAST YEAR, HAVE YOU BEEN AFRAID OF YOUR PARTNER OR EX-PARTNER?: PATIENT DECLINED
WITHIN THE LAST YEAR, HAVE YOU BEEN KICKED, HIT, SLAPPED, OR OTHERWISE PHYSICALLY HURT BY YOUR PARTNER OR EX-PARTNER?: PATIENT DECLINED
WITHIN THE PAST 12 MONTHS, THE FOOD YOU BOUGHT JUST DIDN'T LAST AND YOU DIDN'T HAVE MONEY TO GET MORE: NEVER TRUE
WITHIN THE LAST YEAR, HAVE TO BEEN RAPED OR FORCED TO HAVE ANY KIND OF SEXUAL ACTIVITY BY YOUR PARTNER OR EX-PARTNER?: PATIENT DECLINED
IN THE PAST 12 MONTHS, HAS THE ELECTRIC, GAS, OIL, OR WATER COMPANY THREATENED TO SHUT OFF SERVICE IN YOUR HOME?: NO
WITHIN THE PAST 12 MONTHS, YOU WORRIED THAT YOUR FOOD WOULD RUN OUT BEFORE YOU GOT THE MONEY TO BUY MORE: NEVER TRUE
WITHIN THE LAST YEAR, HAVE YOU BEEN HUMILIATED OR EMOTIONALLY ABUSED IN OTHER WAYS BY YOUR PARTNER OR EX-PARTNER?: PATIENT DECLINED

## 2025-07-12 ASSESSMENT — LIFESTYLE VARIABLES
HOW MANY TIMES IN THE PAST YEAR HAVE YOU HAD 5 OR MORE DRINKS IN A DAY: 0
EVER HAD A DRINK FIRST THING IN THE MORNING TO STEADY YOUR NERVES TO GET RID OF A HANGOVER: NO
EVER FELT BAD OR GUILTY ABOUT YOUR DRINKING: NO
ON A TYPICAL DAY WHEN YOU DRINK ALCOHOL HOW MANY DRINKS DO YOU HAVE: 0
HAVE YOU EVER FELT YOU SHOULD CUT DOWN ON YOUR DRINKING: NO
HAVE PEOPLE ANNOYED YOU BY CRITICIZING YOUR DRINKING: NO
AVERAGE NUMBER OF DAYS PER WEEK YOU HAVE A DRINK CONTAINING ALCOHOL: 0
TOTAL SCORE: 0
CONSUMPTION TOTAL: NEGATIVE
DOES PATIENT WANT TO STOP DRINKING: NO
ALCOHOL_USE: NO

## 2025-07-12 ASSESSMENT — FIBROSIS 4 INDEX: FIB4 SCORE: 1.214285714285714286

## 2025-07-12 ASSESSMENT — PAIN DESCRIPTION - PAIN TYPE
TYPE: CHRONIC PAIN
TYPE: CHRONIC PAIN
TYPE: ACUTE PAIN;CHRONIC PAIN
TYPE: ACUTE PAIN

## 2025-07-12 NOTE — PROGRESS NOTES
Pt transported to floor via gurney with transport. All belongings at bedside. 2L O2 NC. Call light in reach. Bed in lowest position. Fall precautions in place. Fall education provided. Plan of care discussed with pt. Pt agreeing to plan of care. Communication board updated. All questions answered. Assessment completed.

## 2025-07-12 NOTE — CARE PLAN
The patient is Stable - Low risk of patient condition declining or worsening    Shift Goals  Clinical Goals: Stress test, UDS, tele monitor  Patient Goals: Have testing done  Family Goals: ARAM      Problem: Knowledge Deficit - COPD  Goal: Patient/significant other demonstrates understanding of disease process, utilization of the Action Plan, medications and discharge instruction  Description: Target End Date:  1-3 days or as soon as patient condition allows    Document in Patient Education    1.  Discuss the importance of medical follow-up care  2.  Review worsening signs and symptoms of COPD flare and exacerbation and its management  3.  Discuss respiratory medications, side effects, adverse reactions  4.  Demonstrate technique for using a metered-dose inhaler (MDI) and utilization of a spacer  5.  Review the COPD Action Plan  6.  Instruct and reinforce the rationale for breathing exercises, coughing effectively, and general conditioning exercises  7.  Stress importance of oral care and dental hygiene  8.  Discuss the importance of avoiding people with active respiratory infections and need for routine influenza and pneumococcal vaccinations  9.  Discuss factors that may trigger condition and encourage patient/significant other to explore ways to control these factors in and around the home and work setting  10. Review the harmful effects of smoking and advise cessation of smoking  11. Provide information about activity limitations and alternating activities with rest periods to prevent fatigue  12. Instruct asthmatic patient of use of peak flow meter, as appropriate  13. Review oxygen requirements and dosage, safe use of oxygen, and refer to the supplier as indicated  Outcome: Progressing     Problem: Ineffective Airway Clearance  Goal: Patient will maintain patent airway with clear/clearing breath sounds  Description: Target End Date:  Prior to discharge or change in level of care    1.   Position head midline  with flexion appropriate for age and/or condition  2.   Assist patient to assume a position of comfort  3.   Assess and monitor breath sounds  4.   Encourage abdominal or pursed-lip breathing exercises  5.   Assist with measures to improve effectiveness of cough effort  6.   Demonstrate effective coughing and deep-breathing techniques  7.   Assist patient to turn every 2 hours  8.   Encourage patient to ambulate as tolerated  9.   Keep environmental pollution to a minimum  10. Airway suctioning as needed  11. Collaborate with RT to administer medications/treatments per order  12. Promote systemic fluid hydration within cardiac tolerance  13. Provide warm or tepid liquids  Outcome: Progressing     Problem: Risk for Aspiration  Goal: Patient's risk for aspiration will be absent or decrease  Description: Target End Date:  Prior to discharge or change in level of care    1.   Complete dysphagia screening on admission  2.   NPO until dysphagia screening complete or medically cleared  3.   Collaborate with Speech Therapy, Clinical Dietitian and interdisciplinary team  4.   Implement aspiration precautions  5.   Assist patient up to chair for meals  6.   Elevate head of bed 90 degrees if patient is unable to get out of bed  7.   Encourage small bites  8.   Ensure foods/liquids are of appropriate consistency  9.   Assess for any signs/symptoms of aspiration  10. Assess breath sounds and vital signs after oral intake  Outcome: Progressing     Problem: Self Care  Goal: Patient will have the ability to perform ADLs independently or with assistance (bathe, groom, dress, toilet and feed)  Description: Target End Date:  Prior to discharge or change in level of care    Document on ADL flowsheet    1.  Assess the capability and level of deficiency to perform ADLs  2.  Encourage family/care giver involvement  3.  Provide assistive devices  4.  Consider PT/OT evaluations  5.  Maintain support, give positive feedback, encourage  self-care allowing extra time and verbal cuing as needed  6.  Avoid doing something for patients they can do themselves, but provide assistance as needed  7.  Assist in anticipating/planning individual needs  8.  Collaborate with Case Management and  to meet discharge needs  Outcome: Progressing     Problem: Respiratory  Goal: Patient will achieve/maintain optimum respiratory ventilation and gas exchange  Description: Target End Date:  Prior to discharge or change in level of care    Document on Assessment flowsheet    1.  Assess and monitor rate, rhythm, depth and effort of respiration  2.  Breath sounds assessed qshift and/or as needed  3.  Assess O2 saturation, administer/titrate oxygen as ordered  4.  Position patient for maximum ventilatory efficiency  5.  Turn, cough, and deep breath with splinting to improve effectiveness  6.  Collaborate with RT to administer medication/treatments per order  7.  Encourage use of incentive spirometer and encourage patient to cough after use and utilize splinting techniques if applicable  8.  Airway suctioning  9.  Monitor sputum production for changes in color, consistency and frequency  10. Perform frequent oral hygiene  11. Alternate physical activity with rest periods  Outcome: Progressing     Problem: Fall Risk  Goal: Patient will remain free from falls  Description: Target End Date:  Prior to discharge or change in level of care    Document interventions on the [facility] Fall Risk Assessment    1.  Assess for fall risk factors  2.  Implement fall precautions  Outcome: Progressing     Problem: Pain - Standard  Goal: Alleviation of pain or a reduction in pain to the patient’s comfort goal  Description: Target End Date:  Prior to discharge or change in level of care    Document on Vitals flowsheet    1.  Document pain using the appropriate pain scale per order or unit policy  2.  Educate and implement non-pharmacologic comfort measures (i.e. relaxation,  distraction, massage, cold/heat therapy, etc.)  3.  Pain management medications as ordered  4.  Reassess pain after pain med administration per policy  5.  If opiods administered assess patient's response to pain medication is appropriate per POSS sedation scale  6.  Follow pain management plan developed in collaboration with patient and interdisciplinary team (including palliative care or pain specialists if applicable)  Outcome: Progressing

## 2025-07-12 NOTE — ED TRIAGE NOTES
"Chief Complaint   Patient presents with    Shortness of Breath     BIB [facility] from group home. Pt endorsing 2 days of SOB, hx COPD. Pt is 88% on RA    Chest Pain     Burning chest pain, non radiating 9/10      Pt placed on 3L nasal cannula.   Pt is not in acute distress.     Pt educated on alerting staff in changes to condition. Pt verbalized understanding. Protocol ordered.     /55   Pulse (!) 58   Temp 36 °C (96.8 °F) (Temporal)   Resp 16   Ht 1.549 m (5' 1\")   Wt 125 kg (275 lb)   LMP  (LMP Unknown)   SpO2 92%   BMI 51.96 kg/m²     "

## 2025-07-12 NOTE — CARE PLAN
The patient is Stable - Low risk of patient condition declining or worsening    Shift Goals  Clinical Goals: stress test, tele monitor, safety  Patient Goals: feel better  Family Goals: ARAM    Progress made toward(s) clinical / shift goals:    Problem: Knowledge Deficit - Standard  Goal: Patient and family/care givers will demonstrate understanding of plan of care, disease process/condition, diagnostic tests and medications  Description: Target End Date:  7/13/25    1.  Patient and family/caregiver oriented to unit, equipment, visitation policy and means for communicating concern  2.  Complete/review Learning Assessment  3.  Assess knowledge level of disease process/condition, treatment plan, diagnostic tests and medications  4.  Explain disease process/condition, treatment plan, diagnostic tests and medications  Outcome: Progressing     Problem: Self Care  Goal: Patient will have the ability to perform ADLs independently or with assistance (bathe, groom, dress, toilet and feed)  Description: Target End Date: 7/13/25    1.  Assess the capability and level of deficiency to perform ADLs  2.  Encourage family/care giver involvement  3.  Provide assistive devices  4.  Consider PT/OT evaluations  5.  Maintain support, give positive feedback, encourage self-care allowing extra time and verbal cuing as needed  6.  Avoid doing something for patients they can do themselves, but provide assistance as needed  7.  Assist in anticipating/planning individual needs  8.  Collaborate with Case Management and  to meet discharge needs  Outcome: Progressing     Problem: Respiratory  Goal: Patient will achieve/maintain optimum respiratory ventilation and gas exchange  Description: Target End Date:  7/13/25    1.  Assess and monitor rate, rhythm, depth and effort of respiration  2.  Breath sounds assessed qshift and/or as needed  3.  Assess O2 saturation, administer/titrate oxygen as ordered  4.  Position patient for  maximum ventilatory efficiency  5.  Turn, cough, and deep breath with splinting to improve effectiveness  6.  Collaborate with RT to administer medication/treatments per order  7.  Encourage use of incentive spirometer and encourage patient to cough after use and utilize splinting techniques if applicable  8.  Airway suctioning  9.  Monitor sputum production for changes in color, consistency and frequency  10. Perform frequent oral hygiene  11. Alternate physical activity with rest periods  Outcome: Progressing     Problem: Fall Risk  Goal: Patient will remain free from falls  Description: Target End Date:  7/13/25    1.  Assess for fall risk factors  2.  Implement fall precautions  Outcome: Progressing

## 2025-07-12 NOTE — PROGRESS NOTES
4 Eyes Skin Assessment Completed by JC Haile and JC Landry.    Skin assessment is primarily focused on high risk bony prominences. Pay special attention to skin beneath and around medical devices, high risk bony prominences, skin to skin areas and areas where the patient lacks sensation to feel pain and areas where the patient previously had breakdown.     Head (Occipital):  Dry   Ears (Under Medical Devices): WDL   Nose (Under Medical Devices): WDL   Mouth:  WDL   Neck: WDL   Breast/Chest:  Pink, Blanching, and Moisture   Shoulder Blades:  WDL   Spine:   WDL   (R) Arm/Elbow/Hand: WDL   (L) Arm/Elbow/Hand: WDL   Abdomen: WDL   Pannus/Groin:  Pink, Blanching, and Moisture   Sacrum/Coccyx:   Blanching and Moisture   (R) Ischial Tuberosity (Sit Bones):  WDL   (L) Ischial Tuberosity (Sit Bones):  WDL   (R) Leg:  WDL   (L) Leg:  WDL   (R) Heel:  WDL   (R) Foot/Toe: WDL   (L) Heel: WDL   (L) Foot/Toe:  WDL       DEVICES IN USE:   Respiratory Devices:  Nasal cannula and Pulse ox  Feeding Devices:  N/A   Lines & BP Monitoring Devices:  Peripheral IV, BP cuff, and Pulse ox    Orthopedic Devices:  N/A  Miscellaneous Devices:  N/A    PROTOCOL INTERVENTIONS:   Standard/Trauma Bed:  Already in place    WOUND PHOTOS:   N/A no wounds identified    WOUND CONSULT:   N/A, no advanced wound care needs identified

## 2025-07-12 NOTE — RESPIRATORY CARE
COPD EDUCATION by COPD CLINICAL EDUCATOR  7/12/2025 at 8:19 AM by Vijaya Grady RRT     Patient interviewed by COPD education team. Patient refused COPD program at this time, states she has literature and a spacer at home. She has not received her new CPAP machine, Pulmonary Sleep clinic is aware and the patient has an appointment with them at the end of the month she was reminded about. An Action Plan was updated in the EMR to reflect current Respiratory Medication use.                     COPD Screen  COPD Risk Screening  Do you have a history of COPD?: Yes (ACO & KATHIA per Pulm)  Do you have a Pulmonologist?: Yes    COPD Assessment  COPD Clinical Specialists ONLY  COPD Education Initiated: Yes--Short Intervention (Followed by [facility] for ACO & KATHIA, waiting for new CPAP from [employer], 2 L noc w/ [facility], has spacer and declined literature. Lives in group home, quit smoking in 2022.)  Is this a COPD exacerbation patient?: No  DME Company: O2 through [employer], CPAP through [employer]  DME Equipment Type: O2 @ 2 L noc, nebulizer, has orders for new CPAP (has not received yet)  Physician Follow Up Appointment:  (declined apt assistance)  Physician Name: Lili Reddy M.D.  Pulmonary Follow Up Appointment: 07/22/25  Pulmonologist Name: [facility]  Advance Directive: Yes  Referrals Initiated:  (none indicated at this time, quit smoking in 2022)  $ Demo/Eval of SVN's, MDI's and Aerosols:  (has spacer)  (OP) Pulmonary Function Testing: Yes (10/30/2024: FEV1 59%, ratio 87% - ACO and KATHIA per Pulmonary)  Interdisciplinary Rounds: Attendance at Rounds (30 Min)    PFT Results    10/30/2024: FEV1 59%, ratio 87% - ACO and KATHIA per Pulmonary    Meds to Beds  [facility] provides bedside medication delivery for all eligible patients at discharge and you have been automatically enrolled in the Meds to Beds Program. Would you like to opt out of this program for any reason?: No - Stay Opted In     MY COPD ACTION  "PLAN     It is recommended that patients and physicians/healthcare providers complete this action plan together. This plan should be discussed at each physician visit and updated as needed.    The green, yellow and red zones show groups of symptoms of COPD. This list of symptoms is not comprehensive, and you may experience other symptoms. In the \"Actions\" column, your healthcare provider has recommended actions for you to take based on your symptoms.    Patient Name: Lorene Haro   YOB: 1975   Last Updated on: 7/12/2025  8:18 AM   Green Zone:  I am doing well today Actions     Usual activitiy and exercise level   Take daily medications     Usual amounts of cough and phlegm/mucus   Use oxygen as prescribed     Sleep well at night   Continue regular exercise/diet plan     Appetite is good   At all times avoid cigarette smoke, inhaled irritants     Daily Medications (these medications are taken every day):   Fluticosone/Salmeterol (Advair) 1 Puff Twice daily     Additional Information:  Rinse mouth and spit after using Advair    Yellow Zone:  I am having a bad day or a COPD flare Actions     More breathless than usual   Continue daily medications     I have less energy for my daily activities   Use quick relief inhaler as ordered     Increased or thicker phlegm/mucus   Use oxygen as prescribed     Using quick relief inhaler/nebulizer more often   Get plenty of rest     Swelling of ankles more than usual   Use pursed lip breathing     More coughing than usual   At all times avoid cigarette smoke, inhaled irritants     I feel like I have a \"chest cold\"     Poor sleep and my symptoms woke me up     My appetite is not good     My medicine is not helping      Call provider immediately if symptoms don’t improve     Continue daily medications, add rescue medications:   Albuterol  Albuterol 2 Puffs  3mL via nebulizer PRN  PRN       Medications to be used during a flare up, (as Discussed with Provider):      "      Additional Information:  Use spacer with rescue inhaler when nebulizer is not available.     Keep your respiratory equipment clean to reduce the risk of lung infections.     Red Zone:  I need urgent medical care Actions     Severe shortness of breath even at rest   Call 911 or seek medical care immediately     Not able to do any activity because of breathing      Fever or shaking chills      Feeling confused or very drowsy       Chest pains      Coughing up blood

## 2025-07-12 NOTE — H&P
Hospital Medicine History & Physical Note    Date of Service  7/11/2025    Primary Care Physician  Lili Reddy M.D.    Consultants  None    Code Status  Full Code    Chief Complaint  Chief Complaint   Patient presents with    Shortness of Breath     BIB [facility] from group home. Pt endorsing 2 days of SOB, hx COPD. Pt is 88% on RA    Chest Pain     Burning chest pain, non radiating 9/10        History of Presenting Illness  Lorene Haro is a 49 y.o. morbidly obese female from group home with history of chronic hypoxemic respiratory failure dependent nocturnal 2 L, asthma/COPD, KATHIA not on mood disorder, CPAP, chronic A-fib on Eliquis, neuropathy, frequent ER visits (23 ER visits since 2/16/2025) who presented 7/11/2025 with evaluation for chest pain.  In ER, found to have elevated troponin T 45, nonspecific ST changes on EKG.  It appears that she has had chronically elevated troponin, negative MPS in August 2024 and August 2023.  Due to high risk for ACS, admission requested by ERP.  Therefore, admitted to medicine service for further evaluation and treatment.    I discussed the plan of care with patient, bedside RN, charge RN, and pharmacy.    Review of Systems  Review of Systems   Constitutional:  Negative for chills, fever and malaise/fatigue.   HENT:  Negative for hearing loss and tinnitus.    Eyes:  Negative for blurred vision and double vision.   Respiratory:  Positive for shortness of breath. Negative for cough.    Cardiovascular:  Positive for chest pain. Negative for palpitations.   Gastrointestinal:  Negative for abdominal pain, heartburn, nausea and vomiting.   Genitourinary:  Negative for dysuria and urgency.   Musculoskeletal:  Positive for myalgias. Negative for joint pain.   Skin:  Negative for itching and rash.   Neurological:  Positive for weakness. Negative for dizziness and headaches.   Endo/Heme/Allergies:  Negative for environmental allergies. Does not bruise/bleed easily.    Psychiatric/Behavioral:  Negative for depression and substance abuse.    All other systems reviewed and are negative.      Past Medical History   has a past medical history of A-fib ([facility]), MONI (acute kidney injury) ([facility]) (08/09/2023), Asthma, Bipolar 2 disorder ([facility]), Chickenpox, Chronic obstructive pulmonary disease ([facility]), Congestive heart failure (HCC), Hypertension, On home oxygen therapy, Other psychological stress, Schizophrenic disorder ([facility]), and Yeast infection of the skin (04/01/2021).    Surgical History   has a past surgical history that includes hysterectomy laparoscopy; colectomy; cholecystectomy; knee arthroscopy (Left); and orif, ankle (Left, 11/2/2024).     Family History  Family History   Problem Relation Age of Onset    No Known Problems Mother     Cancer Sister         Family history reviewed with patient. There is family history that is pertinent to the chief complaint.     Social History   reports that she quit smoking about 2 years ago. Her smoking use included cigarettes. She has never used smokeless tobacco. She reports that she does not currently use alcohol. She reports that she does not use drugs.    Allergies  Allergies[1]    Medications  Prior to Admission Medications   Prescriptions Last Dose Informant Patient Reported? Taking?   ELIQUIS 5 MG Tab   No No   Sig: TAKE 1 TABLET BY MOUTH TWICE DAILY.   Vitamin D, Cholecalciferol, (CHOLECALCIFEROL) 25 MCG (1000 UT) Tab   No No   Sig: Take 2 Tablets by mouth every day.   albuterol 108 (90 Base) MCG/ACT Aero Soln inhalation aerosol   No No   Sig: Inhale 2 Puffs every 6 hours as needed for Shortness of Breath.   aripiprazole (ABILIFY) 30 MG tablet  Other Facility No No   Sig: Take 1 Tablet by mouth every morning. Indications: Major Depressive Disorder   ascorbic acid (VITAMIN C) 500 MG tablet  Other Facility Yes No   Sig: Take 500 mg by mouth every day.   atorvastatin (LIPITOR) 20 MG Tab  Other Facility No No   Sig: Take 1 Tablet by mouth at  bedtime.   cyclobenzaprine (FLEXERIL) 10 mg Tab   No No   Sig: Take 1 Tablet by mouth 3 times a day as needed for Moderate Pain or Muscle Spasms.   ferrous sulfate 325 (65 Fe) MG tablet  Other Facility Yes No   Sig: Take 325 mg by mouth every 48 hours.   fluticasone (FLONASE) 50 MCG/ACT nasal spray  Other Facility Yes No   Sig: Administer 2 Sprays into each nostril every morning.   fluticasone-salmeterol (ADVAIR DISKUS) 100-50 MCG/ACT AEROSOL POWDER, BREATH ACTIVATED   No No   Sig: Inhale 1 Puff 2 times a day.   gabapentin (NEURONTIN) 300 MG Cap   No No   Sig: Take 1 Capsule by mouth 3 times a day.   guaiFENesin ER (MUCINEX) 600 MG TABLET SR 12 HR   No No   Sig: Take 1 Tablet by mouth 2 times a day as needed for Cough.   hydrOXYzine HCl (ATARAX) 25 MG Tab   No No   Sig: Take 1 Tablet by mouth 3 times a day as needed for Itching.   ketoconazole (NIZORAL) 2 % Cream  Other Facility Yes No   Sig: Apply 1 Application topically 2 times a day.   lidocaine (LIDODERM) 5 % Patch  Other Facility Yes No   Sig: Place 1 Patch on the skin every 12 hours.   metoprolol SR (TOPROL XL) 50 MG TABLET SR 24 HR   No No   Sig: Take 1 Tablet by mouth every day.   montelukast (SINGULAIR) 10 MG Tab   No No   Sig: TAKE 1 TABLET BY MOUTH ONCE DAILY   ondansetron (ZOFRAN ODT) 4 MG TABLET DISPERSIBLE   No No   Sig: Take 1 Tablet by mouth every 6 hours as needed for Nausea/Vomiting.      Facility-Administered Medications: None       Physical Exam  Temp:  [36 °C (96.8 °F)] 36 °C (96.8 °F)  Pulse:  [58] 58  Resp:  [16] 16  BP: (110)/(55) 110/55  SpO2:  [92 %] 92 %  Blood Pressure: 110/55   Temperature: 36 °C (96.8 °F)   Pulse: (!) 58   Respiration: 16   Pulse Oximetry: 92 %       Physical Exam  Vitals and nursing note reviewed.   Constitutional:       General: She is not in acute distress.     Appearance: She is obese.   HENT:      Head: Normocephalic and atraumatic.      Nose: Nose normal.      Mouth/Throat:      Mouth: Mucous membranes are moist.       Pharynx: Oropharynx is clear.   Eyes:      General: No scleral icterus.     Extraocular Movements: Extraocular movements intact.   Cardiovascular:      Rate and Rhythm: Normal rate and regular rhythm.      Pulses: Normal pulses.      Heart sounds:      No friction rub.      Comments: Reproducible chest wall tenderness  Pulmonary:      Effort: No respiratory distress.      Breath sounds: No stridor. Rales present. No wheezing.   Chest:      Chest wall: No tenderness.   Abdominal:      General: There is distension.      Tenderness: There is no abdominal tenderness. There is no guarding or rebound.   Musculoskeletal:         General: Swelling present. Normal range of motion.      Cervical back: Neck supple. No tenderness.      Right lower leg: Edema present.      Left lower leg: Edema present.   Skin:     General: Skin is warm and dry.      Capillary Refill: Capillary refill takes less than 2 seconds.   Neurological:      General: No focal deficit present.      Mental Status: She is alert and oriented to person, place, and time.   Psychiatric:         Mood and Affect: Mood normal.         Laboratory:  Recent Labs     07/09/25 2030 07/11/25 2128   WBC 12.3* 9.7   RBC 4.40 4.35   HEMOGLOBIN 11.6* 11.4*   HEMATOCRIT 38.7 37.8   MCV 88.0 86.9   MCH 26.4* 26.2*   MCHC 30.0* 30.2*   RDW 49.9 50.4*   PLATELETCT 232 196   MPV 11.7 11.0     Recent Labs     07/09/25 2030 07/11/25 2128   SODIUM 140 145   POTASSIUM 4.3 4.3   CHLORIDE 104 106   CO2 26 29   GLUCOSE 111* 117*   BUN 14 15   CREATININE 1.08 1.00   CALCIUM 9.4 9.3     Recent Labs     07/09/25 2030 07/11/25 2128   ALTSGPT 16 9   ASTSGOT 14 14   ALKPHOSPHAT 111* 106*   TBILIRUBIN 0.7 0.6   GLUCOSE 111* 117*         Recent Labs     07/09/25 2030   NTPROBNP 288*         Recent Labs     07/09/25 2030 07/11/25 2128   TROPONINT 38* 45*       Imaging:  DX-CHEST-PORTABLE (1 VIEW)   Final Result      1.  Mild interstitial edema.   2.  Left basilar atelectasis and/or  consolidation. Underlying infection is possible.   3.  Moderate enlargement of the cardiomediastinal silhouette.      NM-CARDIAC STRESS TEST    (Results Pending)       X-Ray:  I have personally reviewed the images and compared with prior images.  EKG:  I have personally reviewed the images and compared with prior images.    Assessment/Plan:  Justification for Admission Status  I anticipate this patient is appropriate for observation status at this time.        * Pain in the chest- (present on admission)  Assessment & Plan  Probable MSK etiology given reproducible chest wall tenderness  However will rule out ACS  Trend CE, EKG  Continue Eliquis, statin  Trial of Toradol  As needed nitro SL  Check MPS    Neuropathy  Assessment & Plan  Gabapentin    Mood disorder (HCC)  Assessment & Plan  Per history schizophrenia  Continue Abilify    Chronic hypoxemic respiratory failure (HCC)- (present on admission)  Assessment & Plan  Dependent on nocturnal 2 L  Currently at baseline    Class 3 severe obesity due to excess calories with serious comorbidity and body mass index (BMI) of 50.0 to 59.9 in adult- (present on admission)  Assessment & Plan  Diet and lifestyle modification  Body mass index is 51.96 kg/m².    COPD with asthma ([facility])- (present on admission)  Assessment & Plan  Currently not in acute exacerbation  Pulmonary hygiene    Hyperlipidemia- (present on admission)  Assessment & Plan  Statin    Bilateral leg edema- (present on admission)  Assessment & Plan  Diuresis    AF (atrial fibrillation) ([facility])- (present on admission)  Assessment & Plan  Chronic A-fib  Continue Eliquis  Resume metoprolol after stress test    KATHIA (obstructive sleep apnea)- (present on admission)  Assessment & Plan  Noncompliant CPAP        VTE prophylaxis: Eliquis         [1]   Allergies  Allergen Reactions    Amoxicillin Rash and Itching     Tolerates cephalosporins, pt reports that she gets a rash all over her body and gets itchy    Penicillin G  Rash and Itching     pt reports that she gets a rash all over her body and gets itchy

## 2025-07-12 NOTE — PROGRESS NOTES
Monitor Summary:  Rhythm: SR  Rate: 57-62  Ectopy: R PVC    0.13/0.10/0.40    Per monitor room interpretation

## 2025-07-12 NOTE — ED PROVIDER NOTES
ED Provider Note    CHIEF COMPLAINT  Chief Complaint   Patient presents with    Shortness of Breath     BIB [facility] from group home. Pt endorsing 2 days of SOB, hx COPD. Pt is 88% on RA    Chest Pain     Burning chest pain, non radiating 9/10        EXTERNAL RECORDS REVIEWED  Outpatient Notes  cardiology, pulmonary    HPI/ROS  LIMITATION TO HISTORY   None  OUTSIDE HISTORIAN(S):  None    Lorene Haro is a 49 y.o. female who presents here for evaluation of shortness of breath and burning chest pain.  Patient has history of COPD, has room air saturation of 88%.  She has no fever or chills and no vomiting.  She also complains of some diffuse chest pain and pressure.  This is ongoing for about a day.  Patient has no radiation to the back.  It    PAST MEDICAL HISTORY   has a past medical history of A-fib ([facility]), MONI (acute kidney injury) ([facility]) (2023), Asthma, Bipolar 2 disorder (HCC), Chickenpox, Chronic obstructive pulmonary disease (HCC), Congestive heart failure (HCC), Hypertension, On home oxygen therapy, Other psychological stress, Schizophrenic disorder (HCC), and Yeast infection of the skin (2021).    SURGICAL HISTORY   has a past surgical history that includes hysterectomy laparoscopy; colectomy; cholecystectomy; knee arthroscopy (Left); and orif, ankle (Left, 2024).    FAMILY HISTORY  Family History   Problem Relation Age of Onset    No Known Problems Mother     Cancer Sister        SOCIAL HISTORY  Social History     Tobacco Use    Smoking status: Former     Current packs/day: 0.00     Types: Cigarettes     Quit date: 10/12/2022     Years since quittin.7    Smokeless tobacco: Never   Vaping Use    Vaping status: Never Used   Substance and Sexual Activity    Alcohol use: Not Currently    Drug use: Never    Sexual activity: Not Currently       CURRENT MEDICATIONS  Home Medications       Reviewed by Garima Terry R.N. (Registered Nurse) on 25 at 1958  Med List Status: Partial  "    Medication Last Dose Status   albuterol 108 (90 Base) MCG/ACT Aero Soln inhalation aerosol  Active   aripiprazole (ABILIFY) 30 MG tablet  Active   ascorbic acid (VITAMIN C) 500 MG tablet  Active   atorvastatin (LIPITOR) 20 MG Tab  Active   cyclobenzaprine (FLEXERIL) 10 mg Tab  Active   ELIQUIS 5 MG Tab  Active   ferrous sulfate 325 (65 Fe) MG tablet  Active   fluticasone (FLONASE) 50 MCG/ACT nasal spray  Active   fluticasone-salmeterol (ADVAIR DISKUS) 100-50 MCG/ACT AEROSOL POWDER, BREATH ACTIVATED  Active   gabapentin (NEURONTIN) 300 MG Cap  Active   guaiFENesin ER (MUCINEX) 600 MG TABLET SR 12 HR  Active   hydrOXYzine HCl (ATARAX) 25 MG Tab  Active   ketoconazole (NIZORAL) 2 % Cream  Active   lidocaine (LIDODERM) 5 % Patch  Active   metoprolol SR (TOPROL XL) 50 MG TABLET SR 24 HR  Active   montelukast (SINGULAIR) 10 MG Tab  Active   ondansetron (ZOFRAN ODT) 4 MG TABLET DISPERSIBLE  Active   Vitamin D, Cholecalciferol, (CHOLECALCIFEROL) 25 MCG (1000 UT) Tab  Active                  Audit from Redirected Encounters    **Home medications have not yet been reviewed for this encounter**         ALLERGIES  Allergies[1]    PHYSICAL EXAM  VITAL SIGNS: /55   Pulse (!) 58   Temp 36 °C (96.8 °F) (Temporal)   Resp 16   Ht 1.549 m (5' 1\")   Wt 125 kg (275 lb)   LMP  (LMP Unknown)   SpO2 92%   BMI 51.96 kg/m²    Constitutional: Well developed, well nourished. mild acute distress.  HEENT: Normocephalic, atraumatic. Posterior pharynx clear and moist.  Eyes:  EOMI. Normal sclera.  Neck: Supple, Full range of motion, nontender.  Chest/Pulmonary: clear to ausculation. Symmetrical expansion.   Cardio: Regular rate and rhythm with no murmur.   Abdomen: Soft, nontender. No peritoneal signs. No guarding. No palpable masses.  Musculoskeletal: No deformity, no edema, neurovascular intact.   Neuro: Clear speech, appropriate, cooperative, cranial nerves II-XII grossly intact.  Psych: Normal mood and affect    EKG; normal " sinus rhythm rate of 61.  No ST elevation, no ST depression.  QTc is 409.  RBBB noted.  Compared to EKG from 2025    EKG/LABS  Results for orders placed or performed during the hospital encounter of 25   EKG    Collection Time: 25  7:59 PM   Result Value Ref Range    Report       [facility] Emergency Dept.    Test Date:  2025  Pt Name:    ADELINA MILLAN                Department: ER  MRN:        [id number]                      Room:       [facility]  Gender:     Female                       Technician: [id number]  :        1975                   Requested By:ER TRIAGE PROTOCOL  Order #:    [id number]                    Reading MD:    Measurements  Intervals                                Axis  Rate:       61                           P:          52  ME:         113                          QRS:        50  QRSD:       122                          T:          -1  QT:         406  QTc:        409    Interpretive Statements  Sinus rhythm  Atrial premature complexes  Borderline short ME interval  Right bundle branch block  ST elevation, consider lateral injury  Compared to ECG 2025 20:25:14  Atrial premature complex(es) now present  Myocardial infarct finding now present  ST (T wave) deviation still present     CBC w/ Differential    Collection Time: 25  9:28 PM   Result Value Ref Range    WBC 9.7 4.8 - 10.8 K/uL    RBC 4.35 4.20 - 5.40 M/uL    Hemoglobin 11.4 (L) 12.0 - 16.0 g/dL    Hematocrit 37.8 37.0 - 47.0 %    MCV 86.9 81.4 - 97.8 fL    MCH 26.2 (L) 27.0 - 33.0 pg    MCHC 30.2 (L) 32.2 - 35.5 g/dL    RDW 50.4 (H) 35.9 - 50.0 fL    Platelet Count 196 164 - 446 K/uL    MPV 11.0 9.0 - 12.9 fL    Neutrophils-Polys 71.20 44.00 - 72.00 %    Lymphocytes 20.90 (L) 22.00 - 41.00 %    Monocytes 6.20 0.00 - 13.40 %    Eosinophils 1.00 0.00 - 6.90 %    Basophils 0.20 0.00 - 1.80 %    Immature Granulocytes 0.50 0.00 - 0.90 %    Nucleated RBC 0.00 0.00 - 0.20 /100 WBC     Neutrophils (Absolute) 6.92 1.82 - 7.42 K/uL    Lymphs (Absolute) 2.03 1.00 - 4.80 K/uL    Monos (Absolute) 0.60 0.00 - 0.85 K/uL    Eos (Absolute) 0.10 0.00 - 0.51 K/uL    Baso (Absolute) 0.02 0.00 - 0.12 K/uL    Immature Granulocytes (abs) 0.05 0.00 - 0.11 K/uL    NRBC (Absolute) 0.00 K/uL   Complete Metabolic Panel (CMP)    Collection Time: 07/11/25  9:28 PM   Result Value Ref Range    Sodium 145 135 - 145 mmol/L    Potassium 4.3 3.6 - 5.5 mmol/L    Chloride 106 96 - 112 mmol/L    Co2 29 20 - 33 mmol/L    Anion Gap 10.0 7.0 - 16.0    Glucose 117 (H) 65 - 99 mg/dL    Bun 15 8 - 22 mg/dL    Creatinine 1.00 0.50 - 1.40 mg/dL    Calcium 9.3 8.5 - 10.5 mg/dL    Correct Calcium 9.4 8.5 - 10.5 mg/dL    AST(SGOT) 14 12 - 45 U/L    ALT(SGPT) 9 2 - 50 U/L    Alkaline Phosphatase 106 (H) 30 - 99 U/L    Total Bilirubin 0.6 0.1 - 1.5 mg/dL    Albumin 3.9 3.2 - 4.9 g/dL    Total Protein 6.8 6.0 - 8.2 g/dL    Globulin 2.9 1.9 - 3.5 g/dL    A-G Ratio 1.3 g/dL   Troponin - STAT Once    Collection Time: 07/11/25  9:28 PM   Result Value Ref Range    Troponin T 45 (H) 6 - 19 ng/L   ESTIMATED GFR    Collection Time: 07/11/25  9:28 PM   Result Value Ref Range    GFR (CKD-EPI) 68 >60 mL/min/1.73 m 2     *Note: Due to a large number of results and/or encounters for the requested time period, some results have not been displayed. A complete set of results can be found in Results Review.         RADIOLOGY/PROCEDURES   I have independently interpreted the diagnostic imaging associated with this visit and am waiting the final reading from the radiologist.   My preliminary interpretation is as follows: Below    Radiologist interpretation:  DX-CHEST-PORTABLE (1 VIEW)   Final Result      1.  Mild interstitial edema.   2.  Left basilar atelectasis and/or consolidation. Underlying infection is possible.   3.  Moderate enlargement of the cardiomediastinal silhouette.          COURSE & MEDICAL DECISION MAKING    ASSESSMENT, COURSE AND PLAN  Care  Narrative: This is a 49-year-old female here for evaluation of chest pain.  Patient has some persistent shortness of breath, but does have some mild edema.  She will be admitted by the hospitalist service for further evaluation and treatment.        DISPOSITION AND DISCUSSIONS  I have discussed management of the patient with the following physicians and JAY's: Hospitalist    FINAL DIAGNOSIS  1. Chest pain, unspecified type         Electronically signed by: Esequiel Ko D.O., 7/11/2025 8:53 PM           [1]   Allergies  Allergen Reactions    Amoxicillin Rash and Itching     Tolerates cephalosporins, pt reports that she gets a rash all over her body and gets itchy    Penicillin G Rash and Itching     pt reports that she gets a rash all over her body and gets itchy

## 2025-07-12 NOTE — PROGRESS NOTES
Nuclear medicine contacted me to let me know that they cannot do the patent's stress test without a UDS. I let them know that I will get one on her as soon as possible.

## 2025-07-12 NOTE — ASSESSMENT & PLAN NOTE
Probable MSK etiology given reproducible chest wall tenderness  However will rule out ACS  Trend CE, EKG  Continue Eliquis, statin  Trial of Toradol  As needed nitro SL  Check MPS

## 2025-07-12 NOTE — DISCHARGE PLANNING
Care Transition Team Assessment    LSW spoke with pt at bedside to complete assessment. Pt verified the information on the face sheet. Pt lives at a group home at [address] [place] [address] [place] [address]. The manager of the home is Shirley [phone]. Pt ambulates with a 4WW at baseline and her walker is currently at bedside. Pt denies using any other DME. Pt stated  staff assist with tasks including getting up, bathing, laundry, and meal preparation. Pt stated she will need assistance with arranging MTM transportation home upon DC.    Information Source  Orientation Level: Oriented X4, Oriented to place, Oriented to time, Oriented to situation, Oriented to person  Information Given By: Patient  Informant's Name: Lorene Haro  Who is responsible for making decisions for patient? : Patient    Readmission Evaluation  Is this a readmission?: No    Elopement Risk  Legal Hold: No  Ambulatory or Self Mobile in Wheelchair: Yes  Disoriented: No  Psychiatric Symptoms: None  History of Wandering: No  Elopement this Admit: No  Vocalizing Wanting to Leave: No  Displays Behaviors, Body Language Wanting to Leave: No-Not at Risk for Elopement  Elopement Risk: Not at Risk for Elopement    Interdisciplinary Discharge Planning  Lives with - Patient's Self Care Capacity: Alone and Able to Care For Self  Patient or legal guardian wants to designate a caregiver: No  Support Systems: Friends / Neighbors, Home Care Staff  Housing / Facility: skilled nursing  Name of Care Facility: [facility]    Discharge Preparedness  What is your plan after discharge?: FDC  What are your discharge supports?: Other (comment) (group home staff)  Prior Functional Level: Ambulatory, Needs Assist with Activities of Daily Living, Needs Assist with Medication Management, Uses Walker  Difficulity with ADLs: Bathing  Difficulity with IADLs: Cooking, Driving, Laundry, Managing medication, Shopping    Functional Assesment  Prior Functional Level: Ambulatory,  Needs Assist with Activities of Daily Living, Needs Assist with Medication Management, Uses Walker    Finances  Financial Barriers to Discharge: No  Prescription Coverage: Yes    Vision / Hearing Impairment  Vision Impairment : Yes  Right Eye Vision: Impaired, Wears Glasses  Left Eye Vision: Impaired, Wears Glasses  Hearing Impairment : No    Advance Directive  Advance Directive?: Living Will, DPOA for Health Care  Durable Power of  Name and Contact : Brittneyuriel Vance [phone] and Kylah Garcia [phone]    Domestic Abuse  Have you ever been the victim of abuse or violence?: No  Possible Abuse/Neglect Reported to:: Not Applicable    Psychological Assessment  History of Substance Abuse: None  History of Psychiatric Problems: No  Non-compliant with Treatment: No  Newly Diagnosed Illness: No    Discharge Risks or Barriers  Discharge risks or barriers?: No  Patient risk factors: Cognitive / sensory / physical deficit, Bariatric    Anticipated Discharge Information  Discharge Disposition: Discharged to home/self care (01)  Discharge Address: [address] [place] [address]  Discharge Contact Phone Number: [phone]

## 2025-07-13 NOTE — PROGRESS NOTES
Pt dc'd home via MTM transport. PIV and monitor removed; monitor room notified. Pt left unit via WC with this RN escort. All personal belongings with pt when leaving unit. Pt given discharge instructions prior to leaving unit including when to follow-up, [id number] delivered and reviewed, verbalizes understanding. Copy of discharge instructions with pt and in the chart.

## 2025-07-13 NOTE — DISCHARGE SUMMARY
Discharge Summary    CHIEF COMPLAINT ON ADMISSION  Chief Complaint   Patient presents with    Shortness of Breath     BIB [facility] from group home. Pt endorsing 2 days of SOB, hx COPD. Pt is 88% on RA    Chest Pain     Burning chest pain, non radiating 9/10        Reason for Admission  EMS     Admission Date  7/11/2025    CODE STATUS  Prior    HPI & HOSPITAL COURSE  As per dr wall h+p    Lorenesamina Haro is a 49 y.o. morbidly obese female from group home with history of chronic hypoxemic respiratory failure dependent nocturnal 2 L, asthma/COPD, KATHIA not on mood disorder, CPAP, chronic A-fib on Eliquis, neuropathy, frequent ER visits (23 ER visits since 2/16/2025) who presented 7/11/2025 with evaluation for chest pain.  In ER, found to have elevated troponin T 45, nonspecific ST changes on EKG.  It appears that she has had chronically elevated troponin, negative MPS in August 2024 and August 2023.  Due to high risk for ACS, admission requested by ERP.  Therefore, admitted to medicine service for further evaluation and treatment.     ========================================    The patient was monitored on telemetry.  She was in NSR on telemetry     Troponins were slightly elevated 45-49 but remained flat      Patient having a Persantin stress test showed no evidence of ischemia.  Patient was cleared for discharge home with the below medications    Patient will follow-up with her PCP further care and management      Therefore, she is discharged in good and stable condition to home with close outpatient follow-up.    The patient recovered much more quickly than anticipated on admission.    Discharge Date  7/12/2025    FOLLOW UP ITEMS POST DISCHARGE      DISCHARGE DIAGNOSES  Principal Problem:    Pain in the chest (POA: Yes)  Active Problems:    KATHIA (obstructive sleep apnea) (POA: Yes)    Morbid obesity (HCC) (POA: Yes)    AF (atrial fibrillation) (HCC) (POA: Yes)    Bilateral leg edema (POA: Yes)    Hyperlipidemia (POA:  Yes)    COPD with asthma (HCC) (POA: Yes)    Class 3 severe obesity due to excess calories with serious comorbidity and body mass index (BMI) of 50.0 to 59.9 in adult (POA: Yes)    Chronic hypoxemic respiratory failure (HCC) (POA: Yes)    Chest pain, likely musculoskeletal (POA: Yes)    Mood disorder (HCC) (POA: Yes)    Neuropathy (POA: Unknown)  Resolved Problems:    * No resolved hospital problems. *      FOLLOW UP  Future Appointments   Date Time Provider Department Center   7/16/2025  3:30 PM [facility] EXAM 4 [facility] None   7/22/2025  2:40 PM Jessica Whitfield P.A.-C. [facility] None   12/3/2025  3:40 PM Oz Borrego M.D. [facility] None     No follow-up provider specified.    MEDICATIONS ON DISCHARGE     Medication List        START taking these medications        Instructions   furosemide 20 MG Tabs  Commonly known as: Lasix   Take 1 Tablet by mouth 2 times a day for 5 days.  Dose: 20 mg            CONTINUE taking these medications        Instructions   aripiprazole 30 MG tablet  Commonly known as: Abilify   Take 1 Tablet by mouth every morning. Indications: Major Depressive Disorder  Dose: 30 mg     ascorbic acid 500 MG tablet  Commonly known as: Vitamin C   Take 500 mg by mouth every day.  Dose: 500 mg     atorvastatin 20 MG Tabs  Commonly known as: Lipitor   Take 1 Tablet by mouth at bedtime.  Dose: 20 mg     cyclobenzaprine 10 MG Tabs  Commonly known as: Flexeril   Take 1 Tablet by mouth 3 times a day as needed for Moderate Pain or Muscle Spasms.  Dose: 10 mg     Eliquis 5 MG Tabs  Generic drug: apixaban   Doctor's comments: This rx was submitted by a pharmacist working under a collaborative practice agreement.  TAKE 1 TABLET BY MOUTH TWICE DAILY.  Dose: 5 mg     ferrous sulfate 325 (65 Fe) MG tablet   Take 325 mg by mouth every 48 hours.  Dose: 325 mg     fluticasone 50 MCG/ACT nasal spray  Commonly known as: Flonase   Administer 2 Sprays into each nostril every morning.  Dose: 2 Spray     fluticasone-salmeterol 100-50  MCG/ACT Aepb  Commonly known as: Advair Diskus   Inhale 1 Puff 2 times a day.  Dose: 1 Puff     gabapentin 300 MG Caps  Commonly known as: Neurontin   Take 1 Capsule by mouth 3 times a day.  Dose: 300 mg     guaiFENesin  MG Tb12  Commonly known as: Mucinex   Take 1 Tablet by mouth 2 times a day as needed for Cough.  Dose: 600 mg     hydrOXYzine HCl 25 MG Tabs  Commonly known as: Atarax   Take 1 Tablet by mouth 3 times a day as needed for Itching.  Dose: 25 mg     ketoconazole 2 % Crea  Commonly known as: Nizoral   Apply 1 Application topically 2 times a day.  Dose: 1 Application     lidocaine 5 % Ptch  Commonly known as: Lidoderm   Place 1 Patch on the skin every 12 hours.  Dose: 1 Patch     metoprolol SR 50 MG Tb24  Commonly known as: Toprol XL   Take 1 Tablet by mouth every day.  Dose: 50 mg     montelukast 10 MG Tabs  Commonly known as: Singulair   TAKE 1 TABLET BY MOUTH ONCE DAILY  Dose: 10 mg     ondansetron 4 MG Tbdp  Commonly known as: Zofran ODT   Take 1 Tablet by mouth every 6 hours as needed for Nausea/Vomiting.  Dose: 4 mg     Ventolin  (90 Base) MCG/ACT Aers inhalation aerosol  Generic drug: albuterol   Inhale 2 Puffs every 6 hours as needed for Shortness of Breath.  Dose: 2 Puff     Vitamin D (Cholecalciferol) 25 MCG (1000 UT) Tabs  Commonly known as: Cholecalciferol   Take 2 Tablets by mouth every day.  Dose: 2,000 Units              Allergies  Allergies[1]    DIET  No orders of the defined types were placed in this encounter.      ACTIVITY  As tolerated.  Weight bearing as tolerated    CONSULTATIONS      PROCEDURES  Cardiac stress test       Show images for NM-CARDIAC STRESS TEST  NM-CARDIAC STRESS TEST  Order: [id number]   Status: Final result       Next appt: 07/16/2025 at 03:30 PM in Vascular Medicine ([place] EXAM 4)    Test Result Released: Yes (not seen)    0 Result Notes  Details    Reading Physician Reading Date Result Priority   No Reading Provider Prelim 7/12/2025    Graeme GALLO  "AJ Pastor  [phone] 2025      Narrative & Impression                           Myocardial Perfusion   Report   NUCLEAR IMAGING INTERPRETATION   No reversible defects that would indicate ischemia.       Normal left ventricular wall motion, with EF of 82%      ADELINA MILLAN      MRN:    [id number]         Gender:    F      Exam Date: 2025 14:00      Exam Location:      Inpatient      Ordering Phys:     ARTHUR CORREA      NucMed Tech:       Lsiset Dumas                       [facility]      Age:    49    :    1975        Ht (in):     62      Wt (lb):     284    BMI:    51.63       Radiologist      Risk Factors:             Obesity      Indications:              Chest Pain      ICD Codes:                786.5      Cardiac History:          Positive risk factors      Cardiac Meds:      Meds Past 24 hrs:      Pretest Chest Pain:       No symptoms      STRESS TEST      Pharmacologi                    aminata Yo   Lexiscan       Dose: 0.4 mg   ol:                                 Post-Injection Exercise:        No exercise followed the intravenous   injection                     Resting HR (bpm):      56      Peak HR (bpm):         73      Resting BP (mmHg):       130    /   42      Peak BP (mmHg):       109   /   51      MaxPHR:     169     Target HR (bpm):       144      % MaxPHR:     43      Double Product:       7957      BP Response:      Stress Termination:       COMPLETED PROTOCOL      Stress Symptoms:   Chest Heaviness,\"feels weird\",dizziness,shortness of breath      ECG      Resting ECG:      Stress ECG:      IMAGE PROTOCOL      Rest/Stress 1                        Day              RadiopharmaceuticalDose (mCi)   Imaging  Date      Imaging  Time           Inj to Img Time (min)   Rest:   Tc-99m             6.9          2025        15:35                   30           Tetrofosmin   Stress: Tc-99m             24.0         2025        16:28                   30           " Tetrofosmin      Rest:   Administration Site:       Left antecubital                               fossa   Administered by:      Lisset MANCILLA      Stress:   Administration Site:       Left antecubital                               fossa   Administered by:      Lisset MANCILLA      % Percent HR Achieved:   SPECT RESULTS      Technical Quality:       Good      Raw Data Analysis:   Summed Stress Score:    4   Summed Rest Score:    3   Summed Difference Score:        2   PERFUSION:   No reversible defects that would indicate ischemia.       FUNCTIONAL RESULTS (calculated via Gated SPECT)      Stress Image LV EF:        82     %      Upper Normal Limit      Stress EDV:      112    ml   EDVI:    50      ml/m2      Stress ESV:      20     ml   ESVI:    9       ml/m2      TID:    1.06   TID - 1.19      TID (ed) - 1.23   LV Function:   Normal left ventricular wall motion, with EF of 82%                            Graeme Pastor MD   (Electronically Signed)   Final Date:      12 July 2025                     16:47        Exam Ended: 07/12/25  4:42 PM Last Resulted: 07/12/25  4:47 PM       Order Details        View Encounter        Lab and Collection Details        Routing        Result History - Result Edited     View All Conversations on this Encounter     Linked Documents    View SynapseCV Report      Scans on Order [id number]    Scan on 7/12/2025  4:42 PM by Lisset Dumas: NM Worksheet           LABORATORY  Lab Results   Component Value Date    SODIUM 142 07/12/2025    POTASSIUM 5.1 07/12/2025    CHLORIDE 107 07/12/2025    CO2 26 07/12/2025    GLUCOSE 93 07/12/2025    BUN 19 07/12/2025    CREATININE 1.14 07/12/2025    CREATININE 0.9 09/11/2008        Lab Results   Component Value Date    WBC 8.3 07/12/2025    HEMOGLOBIN 10.9 (L) 07/12/2025    HEMATOCRIT 36.3 (L) 07/12/2025    PLATELETCT 175 07/12/2025        Total time of the discharge process exceeds 35 minutes.       [1]   Allergies  Allergen Reactions     Amoxicillin Rash and Itching     Tolerates cephalosporins, pt reports that she gets a rash all over her body and gets itchy    Penicillin G Rash and Itching     pt reports that she gets a rash all over her body and gets itchy

## 2025-07-16 ENCOUNTER — ANTICOAGULATION VISIT (OUTPATIENT)
Dept: VASCULAR LAB | Facility: MEDICAL CENTER | Age: 50
End: 2025-07-16
Attending: INTERNAL MEDICINE
Payer: MEDICAID

## 2025-07-16 VITALS — HEART RATE: 57 BPM | SYSTOLIC BLOOD PRESSURE: 109 MMHG | DIASTOLIC BLOOD PRESSURE: 68 MMHG

## 2025-07-16 DIAGNOSIS — I48.0 PAROXYSMAL ATRIAL FIBRILLATION (HCC): Primary | ICD-10-CM

## 2025-07-16 PROCEDURE — 99212 OFFICE O/P EST SF 10 MIN: CPT

## 2025-07-16 PROCEDURE — 99284 EMERGENCY DEPT VISIT MOD MDM: CPT

## 2025-07-16 ASSESSMENT — FIBROSIS 4 INDEX: FIB4 SCORE: 1.36

## 2025-07-16 ASSESSMENT — PAIN DESCRIPTION - PAIN TYPE: TYPE: ACUTE PAIN

## 2025-07-16 NOTE — PROGRESS NOTES
Target end date: Indefinite  Indication: AF  Drug: Eliquis 5 mg bid  CHADsVASC = 6  HAS-BLED = 0    [id number] Stroke Risk Points: 6   Values used to calculate this score:    Points  Metrics       1        Has Congestive Heart Failure: Yes       1        Has Hypertension: Yes       0        Age: 49       1        Has Diabetes: Yes       2        Had Stroke: No                 Had TIA: Yes                 Had Thromboembolism: No       0        Has Vascular Disease: No       1        Clinically Relevant Sex: Female        Health Status Since Last Assessment  Recently hospitalized for shortness of breath and burning chest pain. No sign of PE on imaging.   Had ED visit for swelling of leg, no DVT.   Pt denies any embolic events (stroke/tia/systemic embolism)      Adherence with DOAC Therapy  Pt has not missed doses in the average week.    Bleeding Risk Assessment  Pt reports epistaxis, 2 bloody noses per month in last 2 months   Pt reports any hematuria - none   Pt denies any excessive or unusual bleeding/hematomas.  Pt denies any GI bleeds or hematemesis.  Pt denies any concerning daily headache or subdural hematoma symptoms.    Latest Hemoglobin: WNL, worsened from previous  Hemoglobin   Date Value Ref Range Status   07/12/2025 10.9 (L) 12.0 - 16.0 g/dL Final     Latest Platelets: WNL  Platelet Count   Date Value Ref Range Status   07/12/2025 175 164 - 446 K/uL Final       Pt denies  ETOH overuse     Creatinine Clearance/Renal Function  Latest CrCl: >100 ml/min    Hepatic function  Latest LFTs: WNL  Pt denies any history of liver dysfunction        Drug Interactions  ASA/other antiplatelets: None  NSAID: None  Other drug interactions: None  Verified no anticonvulsant or azole therapy, education provided for future use.     Examination  Blood Pressure WNL  Vitals:    07/16/25 1543   BP: 109/68   Pulse: (!) 57       Symptomatic hypotension: Reports dizziness and lightheadedness at baseline.   Significant gait  impairment/imbalance/high risk for falls? Patient uses walker, having difficulty with losing strength in legs    Final Assessment and Recommendations:  Patient appears stable from the anticoagulation standpoint.    Benefits of continued DOAC therapy outweigh risks for this patient  Recommend pt continue with current DOAC therapy: Eliquis 5 mg BID   DOAC is affordable    Patient reported blurry vision, BP okay.      Other Actions: CMP/CBC hemogram ordered prior to next visit    Follow up:  Will follow up with patient 3 month(s).     Yadira Hernandez, PharmD

## 2025-07-17 ENCOUNTER — APPOINTMENT (OUTPATIENT)
Dept: RADIOLOGY | Facility: MEDICAL CENTER | Age: 50
End: 2025-07-17
Attending: STUDENT IN AN ORGANIZED HEALTH CARE EDUCATION/TRAINING PROGRAM
Payer: MEDICAID

## 2025-07-17 ENCOUNTER — HOSPITAL ENCOUNTER (EMERGENCY)
Facility: MEDICAL CENTER | Age: 50
End: 2025-07-17
Attending: STUDENT IN AN ORGANIZED HEALTH CARE EDUCATION/TRAINING PROGRAM
Payer: MEDICAID

## 2025-07-17 ENCOUNTER — TELEPHONE (OUTPATIENT)
Dept: VASCULAR LAB | Facility: MEDICAL CENTER | Age: 50
End: 2025-07-17
Payer: MEDICAID

## 2025-07-17 VITALS
OXYGEN SATURATION: 99 % | HEIGHT: 62 IN | DIASTOLIC BLOOD PRESSURE: 62 MMHG | WEIGHT: 284 LBS | BODY MASS INDEX: 52.26 KG/M2 | SYSTOLIC BLOOD PRESSURE: 120 MMHG | RESPIRATION RATE: 16 BRPM | HEART RATE: 59 BPM | TEMPERATURE: 97.9 F

## 2025-07-17 DIAGNOSIS — J44.89 ASTHMA-COPD OVERLAP SYNDROME (HCC): ICD-10-CM

## 2025-07-17 DIAGNOSIS — M25.571 ACUTE RIGHT ANKLE PAIN: Primary | ICD-10-CM

## 2025-07-17 PROCEDURE — 73610 X-RAY EXAM OF ANKLE: CPT | Mod: RT

## 2025-07-17 PROCEDURE — 700102 HCHG RX REV CODE 250 W/ 637 OVERRIDE(OP): Mod: UD | Performed by: STUDENT IN AN ORGANIZED HEALTH CARE EDUCATION/TRAINING PROGRAM

## 2025-07-17 PROCEDURE — A9270 NON-COVERED ITEM OR SERVICE: HCPCS | Mod: UD | Performed by: STUDENT IN AN ORGANIZED HEALTH CARE EDUCATION/TRAINING PROGRAM

## 2025-07-17 RX ORDER — MONTELUKAST SODIUM 10 MG/1
10 TABLET ORAL DAILY
Qty: 90 TABLET | Refills: 1 | Status: SHIPPED | OUTPATIENT
Start: 2025-07-17

## 2025-07-17 RX ORDER — CEPHALEXIN 250 MG/1
CAPSULE ORAL
Qty: 180 EACH | Refills: 1 | Status: SHIPPED | OUTPATIENT
Start: 2025-07-17

## 2025-07-17 RX ORDER — ACETAMINOPHEN 325 MG/1
650 TABLET ORAL ONCE
Status: COMPLETED | OUTPATIENT
Start: 2025-07-17 | End: 2025-07-17

## 2025-07-17 RX ADMIN — ACETAMINOPHEN 650 MG: 325 TABLET ORAL at 01:22

## 2025-07-17 NOTE — ED TRIAGE NOTES
"Chief Complaint   Patient presents with    Ankle Pain     Pt BIB EMS from [facility] for right ankle pain. Pt denies trauma or fall. Pt arrives on 2L baseline O2.    Blood Pressure: (!) 159/72, Pulse: 69, Respiration: 16, Temperature: 36.6 °C (97.9 °F), Height: 157.5 cm (5' 2\"), Weight: (!) 129 kg (284 lb), BMI (Calculated): 51.94, BSA (Calculated): 2.4, Pulse Oximetry: 93 % (88 RA), O2 (LPM): 2    "

## 2025-07-17 NOTE — ED NOTES
Pt wheeled to the room via WC. SpO2 and BP monitors. Call light within reach. No further complaints at this time. Chart up for ERP. O2 NC 2L

## 2025-07-17 NOTE — TELEPHONE ENCOUNTER
Received New Start request via [employer]  for ELIQUIS 5MG TABLETS. (Quantity:60, Day Supply:30)     Insurance: [place] MEDICA[place]  Member ID:  [id number]  BIN: [id number]  PCN: [id number]  [id number]     Ran Test claim via [employer] & medication pays for $0 copay. Pt requests to release Rx to pharmacy on file: [facility] [place] [address]    CURTIS Aponte, PhT  Vascular Pharmacy Liaison (Rx Coordinator)  P: [phone]  7/17/2025 9:37 AM

## 2025-07-17 NOTE — ED NOTES
Rounded on Pt. Pt states that she has to use the restroom. Due to Pt' limited mobility, Pt was assisted with a bed pan.

## 2025-07-17 NOTE — ED NOTES
Pt discharged to home. Pt provided education and discharge instructions per ERP. Pt ambulatory out of ED with her walker

## 2025-07-17 NOTE — ED PROVIDER NOTES
ER Provider Note    Scribed for MARCEL Campbell by Chasidy Davis. 7/17/2025  1:15 AM    Primary Care Provider: Lili Reddy M.D.    CHIEF COMPLAINT  Chief Complaint   Patient presents with    Ankle Pain       EXTERNAL RECORDS REVIEWED  Inpatient Notes Patient was seen on 07/12/2025 for shortness of breath and chest pain  and was admitted for further evaluation     HPI/ROS  LIMITATION TO HISTORY   Select: : None    OUTSIDE HISTORIAN(S):  None     Lorene Haro is a 49 y.o. female who presents to the ED by EMS for evaluation of worsening right ankle pain onset 1 day ago. Patient reports her ankle pain radiates into her right calf. In the ED, she denies any trauma or injuries.  She presents associated symptoms of swelling to the right ankle. Denies fevers, or chills. No alleviating factors noted at this time. Patient has additional history of hypertension.     PAST MEDICAL HISTORY  Past Medical History[1]    SURGICAL HISTORY  Past Surgical History[2]    FAMILY HISTORY  Family History   Problem Relation Age of Onset    No Known Problems Mother     Cancer Sister        SOCIAL HISTORY   reports that she quit smoking about 2 years ago. Her smoking use included cigarettes. She has never used smokeless tobacco. She reports that she does not currently use alcohol. She reports that she does not use drugs.    CURRENT MEDICATIONS  Current Outpatient Medications   Medication Instructions    albuterol 108 (90 Base) MCG/ACT Aero Soln inhalation aerosol 2 Puffs, Inhalation, EVERY 6 HOURS PRN    apixaban (ELIQUIS) 5 mg, Oral, 2 TIMES DAILY    aripiprazole (ABILIFY) 30 mg, Oral, EVERY MORNING    ascorbic acid (VITAMIN C) 500 mg, DAILY    atorvastatin (LIPITOR) 20 mg, Oral, EVERY BEDTIME    cyclobenzaprine (FLEXERIL) 10 mg, Oral, 3 TIMES DAILY PRN    ferrous sulfate 325 mg, EVERY 48 HOURS    fluticasone (FLONASE) 50 MCG/ACT nasal spray 2 Sprays, EVERY MORNING    fluticasone-salmeterol (ADVAIR DISKUS) 100-50  "MCG/ACT AEROSOL POWDER, BREATH ACTIVATED 1 Puff, Inhalation, 2 TIMES DAILY    furosemide (LASIX) 20 mg, Oral, 2 TIMES DAILY    gabapentin (NEURONTIN) 300 mg, Oral, 3 TIMES DAILY    guaiFENesin ER (MUCINEX) 600 mg, Oral, 2 TIMES DAILY PRN    hydrOXYzine HCl (ATARAX) 25 mg, Oral, 3 TIMES DAILY PRN    ketoconazole (NIZORAL) 2 % Cream 1 Application, 2 TIMES DAILY    lidocaine (LIDODERM) 5 % Patch 1 Patch, EVERY 12 HOURS    metoprolol SR (TOPROL XL) 50 mg, Oral, DAILY    montelukast (SINGULAIR) 10 mg, Oral, DAILY    ondansetron (ZOFRAN ODT) 4 mg, Oral, EVERY 6 HOURS PRN    Vitamin D (Cholecalciferol) (CHOLECALCIFEROL) 2,000 Units, Oral, DAILY          ALLERGIES  Amoxicillin and Penicillin g    PHYSICAL EXAM  /76   Pulse (!) 59   Temp 36.6 °C (97.9 °F) (Temporal)   Resp 16   Ht 1.575 m (5' 2\")   Wt (!) 129 kg (284 lb)   LMP  (LMP Unknown)   SpO2 95%   BMI 51.94 kg/m²   Constitutional: Alert in no apparent distress. Chronically ill appearing.   HENT: No signs of trauma, Bilateral external ears normal, Nose normal.   Eyes: Pupils are equal and reactive, Conjunctiva normal, Non-icteric.   Neck: Normal range of motion, No tenderness, Supple, No stridor.   Cardiovascular: Regular rate and rhythm, no murmurs.   Thorax & Lungs: Normal breath sounds, No respiratory distress, No wheezing, No chest tenderness.   Abdomen: Bowel sounds normal, Soft, No tenderness, No masses, No pulsatile masses.   Skin: Warm, Dry, No erythema, No rash.   Back: No bony tenderness, No CVA tenderness.   Extremities: Intact distal pulses, bilateral lower extremities edema with no erythema or warmth, no ulcerations or cyanosis. Normal peripheral pulses. Normal range of motion in right ankle  Musculoskeletal: Good range of motion in all major joints. No tenderness to palpation or major deformities noted.   Neurologic: Alert , Normal motor function, Normal sensory function, No focal deficits noted.     DIAGNOSTIC STUDIES & " PROCEDURES      Radiology:   The attending Emergency Physician has independently interpreted the diagnostic imaging associated with this visit and is awaiting the final reading from the radiologist, which will be displayed below.    Preliminary interpretation is a follows: No fracture or dislocation of the right ankle  Radiologist interpretation:    DX-ANKLE 3+ VIEWS RIGHT   Final Result      Soft tissue swelling. No fracture or dislocation.           COURSE & MEDICAL DECISION MAKING      INITIAL ASSESSMENT AND PLAN  Care Narrative:       1:15 AM - Patient seen and evaluated at bedside.  Patient presenting with right ankle pain.  Patient has no signs of acute arterial disease, DVT or septic arthritis.  Patient has no signs of cellulitis.  Patient does have bilateral lower extremity pitting edema.  Patient is afebrile has not had any infectious symptoms to suggest osteomyelitis.  Discussed plan of care, including ordering imaging. Patient agrees to plan of care.     2:07 AM - I reevaluated the patient at bedside. The patient informs me they feel improved following medication administration. I discussed the patient's diagnostic study results. I discussed plan for discharge and follow up as outlined below. The patient is stable for discharge at this time and will return for any new or worsening symptoms. Patient verbalizes understanding and support with my plan for discharge.                   DISPOSITION AND DISCUSSIONS  I have discussed management of the patient with the following physicians and JAY's: None     Discussion of management with other Q or appropriate source(s): None     Escalation of care considered, and ultimately not performed: Laboratory analysis and diagnostic imaging.      The patient will return for new or worsening symptoms and is stable at the time of discharge.    The patient is referred to a primary physician for blood pressure management, diabetic screening, and for all other preventative  health concerns.    DISPOSITION:  Patient will be discharged home in stable condition.    FOLLOW UP:  Lili Reddy M.D.  [address]  [address]  [facility] [id number] [phone]    Schedule an appointment as soon as possible for a visit       [facility], Emergency Dept  [address] [facility] [id number] [phone]  Go to   If symptoms worsen      OUTPATIENT MEDICATIONS:  Discharge Medication List as of 7/17/2025  2:17 AM             FINAL IMPRESSION   1. Acute right ankle pain         Chasidy HARRIS (Ade), servando scribing for, and in the presence of, Manjit Polanco DO.    Electronically signed by: Chasidy Davis (Ade), 7/17/2025    Manjit HARRIS DO personally performed the services described in this documentation, as scribed by Chasidy Davis in my presence, and it is both accurate and complete.    The note accurately reflects work and decisions made by me.  Manjit Polanco D.O.  7/17/2025  3:54 AM         [1]   Past Medical History:  Diagnosis Date    A-fib (HCC)     MONI (acute kidney injury) (HCC) 08/09/2023    Asthma     Bipolar 2 disorder (HCC)     Chickenpox     Chronic obstructive pulmonary disease (HCC)     Congestive heart failure (HCC)     Hypertension     On home oxygen therapy     Other psychological stress     Schizophrenic disorder (HCC)     Yeast infection of the skin 04/01/2021   [2]   Past Surgical History:  Procedure Laterality Date    ORIF, ANKLE Left 11/2/2024    Procedure: ORIF, ANKLE- TALUS;  Surgeon: Roman Rosa M.D.;  Location: SURGERY [facility];  Service: Orthopedics    CHOLECYSTECTOMY      COLECTOMY      HYSTERECTOMY LAPAROSCOPY      KNEE ARTHROSCOPY Left

## 2025-07-17 NOTE — TELEPHONE ENCOUNTER
Have we ever prescribed this med? Yes.  If yes, what date? 06/18/25    Last OV: 12/06/24 with daniel SHRESTHA     Next OV: No Pending appt.     DX:     Medications:   Requested Prescriptions     Pending Prescriptions Disp Refills    montelukast (SINGULAIR) 10 MG Tab [Pharmacy Med Name: MONTELUKAST SOD 10 MG TABLET] 30 Tablet 0     Sig: TAKE 1 TABLET BY MOUTH ONCE DAILY    ADVAIR DISKUS 100-50 MCG/ACT AEROSOL POWDER, BREATH ACTIVATED [Pharmacy Med Name: ADVAIR 100-50 DISKUS]  0     Sig: INHALE 1 PUFF BY MOUTH EVERY 12 HOURS. RINSE MOUTH AFTER EACH USE.

## 2025-07-22 ENCOUNTER — HOSPITAL ENCOUNTER (EMERGENCY)
Facility: MEDICAL CENTER | Age: 50
End: 2025-07-23
Attending: EMERGENCY MEDICINE
Payer: MEDICAID

## 2025-07-22 ENCOUNTER — NON-PROVIDER VISIT (OUTPATIENT)
Dept: CARDIOLOGY | Facility: MEDICAL CENTER | Age: 50
End: 2025-07-22
Payer: MEDICAID

## 2025-07-22 ENCOUNTER — OFFICE VISIT (OUTPATIENT)
Dept: SLEEP MEDICINE | Facility: MEDICAL CENTER | Age: 50
End: 2025-07-22
Attending: PHYSICIAN ASSISTANT
Payer: MEDICAID

## 2025-07-22 VITALS
SYSTOLIC BLOOD PRESSURE: 118 MMHG | BODY MASS INDEX: 52.26 KG/M2 | DIASTOLIC BLOOD PRESSURE: 68 MMHG | OXYGEN SATURATION: 93 % | HEIGHT: 62 IN | WEIGHT: 284 LBS | RESPIRATION RATE: 18 BRPM | HEART RATE: 54 BPM

## 2025-07-22 DIAGNOSIS — G47.33 OSA (OBSTRUCTIVE SLEEP APNEA): ICD-10-CM

## 2025-07-22 DIAGNOSIS — J96.11 CHRONIC RESPIRATORY FAILURE WITH HYPOXIA (HCC): Primary | ICD-10-CM

## 2025-07-22 DIAGNOSIS — M54.41 ACUTE MIDLINE LOW BACK PAIN WITH RIGHT-SIDED SCIATICA: Primary | ICD-10-CM

## 2025-07-22 DIAGNOSIS — E66.2 OBESITY HYPOVENTILATION SYNDROME (HCC): ICD-10-CM

## 2025-07-22 PROCEDURE — 99284 EMERGENCY DEPT VISIT MOD MDM: CPT

## 2025-07-22 PROCEDURE — 3074F SYST BP LT 130 MM HG: CPT | Performed by: PHYSICIAN ASSISTANT

## 2025-07-22 PROCEDURE — 700102 HCHG RX REV CODE 250 W/ 637 OVERRIDE(OP): Mod: UD | Performed by: EMERGENCY MEDICINE

## 2025-07-22 PROCEDURE — 3078F DIAST BP <80 MM HG: CPT | Performed by: PHYSICIAN ASSISTANT

## 2025-07-22 PROCEDURE — A9270 NON-COVERED ITEM OR SERVICE: HCPCS | Mod: UD | Performed by: EMERGENCY MEDICINE

## 2025-07-22 PROCEDURE — 93298 REM INTERROG DEV EVAL SCRMS: CPT | Mod: 26 | Performed by: INTERNAL MEDICINE

## 2025-07-22 PROCEDURE — 99213 OFFICE O/P EST LOW 20 MIN: CPT | Performed by: PHYSICIAN ASSISTANT

## 2025-07-22 RX ORDER — HYDROCODONE BITARTRATE AND ACETAMINOPHEN 5; 325 MG/1; MG/1
1 TABLET ORAL ONCE
Refills: 0 | Status: COMPLETED | OUTPATIENT
Start: 2025-07-22 | End: 2025-07-22

## 2025-07-22 RX ORDER — LIDOCAINE 4 G/G
1 PATCH TOPICAL DAILY
Status: DISCONTINUED | OUTPATIENT
Start: 2025-07-23 | End: 2025-07-23 | Stop reason: HOSPADM

## 2025-07-22 RX ORDER — ACETAMINOPHEN 325 MG/1
650 TABLET ORAL ONCE
Status: COMPLETED | OUTPATIENT
Start: 2025-07-22 | End: 2025-07-22

## 2025-07-22 RX ORDER — CYCLOBENZAPRINE HCL 10 MG
10 TABLET ORAL ONCE
Status: COMPLETED | OUTPATIENT
Start: 2025-07-22 | End: 2025-07-22

## 2025-07-22 RX ADMIN — ACETAMINOPHEN 650 MG: 325 TABLET ORAL at 22:47

## 2025-07-22 RX ADMIN — HYDROCODONE BITARTRATE AND ACETAMINOPHEN 1 TABLET: 5; 325 TABLET ORAL at 22:47

## 2025-07-22 RX ADMIN — CYCLOBENZAPRINE 10 MG: 10 TABLET, FILM COATED ORAL at 22:47

## 2025-07-22 ASSESSMENT — ENCOUNTER SYMPTOMS
HEADACHES: 1
SINUS PAIN: 0
SORE THROAT: 0
COUGH: 0
DIZZINESS: 1
WEIGHT LOSS: 0
SPUTUM PRODUCTION: 0
WHEEZING: 0
TREMORS: 0
FEVER: 0
HEARTBURN: 0
SHORTNESS OF BREATH: 0
ORTHOPNEA: 0
INSOMNIA: 0
PALPITATIONS: 1
CHILLS: 0

## 2025-07-22 ASSESSMENT — FIBROSIS 4 INDEX
FIB4 SCORE: 1.36
FIB4 SCORE: 1.36

## 2025-07-22 NOTE — PROGRESS NOTES
"Chief Complaint   Patient presents with    Apnea     Last Office Visit 4/18/2025 with Arabella SHRESTHA     2LPM nocturnal oxygen.        HPI:  Lorene Haro is a 49 y.o. year old female here today for follow-up on KATHIA, obesity hypoventilation syndrome, chronic respiratory failure with hypoxia, severe persistent asthma.  Former smoker, has been followed by pulmonary clinic.  Unfortunately patient has been unable to receive her previously ordered iVAPS device to manage her sleep apnea and obesity hypoventilation syndrome.  Last seen in clinic 4/18/2025 by HENRIETTA Malloy.  Patient previously evaluated by Dr. Isa Meraz on 8/28/2020.    Past Medical History: Chronic back pain with sciatica, bipolar disorder, schizophrenia, anxiety, COVID-19 pneumonia in October 2021, atrial fibrillation, iron deficiency anemia, KATHIA, chronic respiratory failure with hypoxia, obesity hypoventilation syndrome, severe persistent asthma, former smoker.  Patient lives in a group home setting.    Vitals:  /68 (BP Location: Right arm, Patient Position: Sitting, BP Cuff Size: Adult)   Pulse (!) 54   Resp 18   Ht 1.575 m (5' 2\")   Wt (!) 129 kg (284 lb)   SpO2 93% BMI of 51.94 kg/m²    Recent Imaging: Chest x-ray obtained 7/11/2025 demonstrated mild interstitial edema, left basilar atelectasis, moderate enlargement cardiomediastinal silhouette.  (History of frequent ER visits for increased shortness of breath and other respiratory symptoms.)    Currently using oxygen at 2 L at night.      Device obtained needs new order/study  DME provider for oxygen is A+    Polysomnogram obtained 12/8/2018 demonstrated overall AHI of 130 events per hour, low O2 sat of 79% with sats less than 89% for 44.7% of sleep time.  Reduced sleep efficiency was noted at 51.8%.  Findings consistent with extremely severe sleep apnea hypopnea syndrome and severe nocturnal oxygen desaturation.  Patient was trialed on CPAP and BiPAP therapy with " recommendation for dedicated full night positive airway pressure titration, consider  iVAPS.     Polysomnography obtained for titration 2/15/2019 demonstrated severe OSAH with ineffective AVAPS titration. Therapy was initiated on max IPAP of 25, EPAP of 11, rate of 14 tidal volume 450 with 3 L O2 bleed in.     Patient was previously on iVaps device @ Ipap 25/15, Epap 11,  mL, rate 14, with 3L oxygen bleedin but subsequently was broken and has not been able to obtain replacement.    Subsequent titration study obtained 7/3/2022 overall AHI of 41.43 events per hour increasing to 53 during REM sleep, low O2 sat of 75% with sats less than or equal to 88 for 179.6 minutes of recorded time.  Patient was trialed on CPAP BiPAP and iVAPS for her known severe obstructive sleep apnea with significant hypoxia previously on AVAPS therapy, 65% central events noted.  Patient had difficult to control sleep apnea but demonstrated improvement with AVAPS, recommendation was made for Avaps with EPAP 11, pressure support 4-14, backup rate 14, rise time 1, nighttime 1, tidal volume 450 mL.  Follow-up overnight oximetry once stable on NIV to reassess oxygenation.    Titration study obtained 6/26/2024 demonstrated overall AHI of 89.53 events per hour, low O2 sat of 76% with sats less than or equal to 88% for 305.6 minutes of recorded time.  Patient was titrated on high Zee due to difficult to control respiratory events and oxygenation.  Patient demonstrated improvement with increased EPAP and target minute ventilation.  Recommendation for AVAPS at a target minute ventilation 5.6 L, rate of 18, pressure support of 4-14, EPAP of 16 I time of 0.3.      Symptoms experiences daytime somnolence  and reports morning headaches         Review of Systems   Constitutional:  Positive for malaise/fatigue (mild). Negative for chills, fever and weight loss.   HENT:  Negative for congestion, hearing loss, nosebleeds, sinus pain, sore throat and  tinnitus.    Eyes:         Presc glasses    Respiratory:  Negative for cough, sputum production, shortness of breath and wheezing.    Cardiovascular:  Positive for palpitations (afib) and leg swelling (mild). Negative for chest pain and orthopnea.   Gastrointestinal:  Negative for heartburn.        No dentures, missing most teeth, no swallowing issues    Neurological:  Positive for dizziness and headaches (in am). Negative for tremors.   Psychiatric/Behavioral:  The patient does not have insomnia.        Past Medical History[1]    Past Surgical History[2]    Family History   Problem Relation Age of Onset    No Known Problems Mother     Cancer Sister        Social History     Socioeconomic History    Marital status: Single     Spouse name: Not on file    Number of children: Not on file    Years of education: Not on file    Highest education level: Not on file   Occupational History    Not on file   Tobacco Use    Smoking status: Former     Current packs/day: 0.00     Types: Cigarettes     Quit date: 10/12/2022     Years since quittin.7    Smokeless tobacco: Never   Vaping Use    Vaping status: Never Used   Substance and Sexual Activity    Alcohol use: Not Currently    Drug use: Never    Sexual activity: Not Currently   Other Topics Concern    Not on file   Social History Narrative    ** Merged History Encounter **         From [place]     Social Drivers of Health     Financial Resource Strain: Medium Risk (2022)    Overall Financial Resource Strain (CARDIA)     Difficulty of Paying Living Expenses: Somewhat hard   Food Insecurity: No Food Insecurity (2025)    Hunger Vital Sign     Worried About Running Out of Food in the Last Year: Never true     Ran Out of Food in the Last Year: Never true   Transportation Needs: No Transportation Needs (2025)    PRAPARE - Transportation     Lack of Transportation (Medical): No     Lack of Transportation (Non-Medical): No   Physical Activity: Not on file   Stress:  Not on file   Social Connections: Not on file   Intimate Partner Violence: Patient Declined (7/12/2025)    Humiliation, Afraid, Rape, and Kick questionnaire     Fear of Current or Ex-Partner: Patient declined     Emotionally Abused: Patient declined     Physically Abused: Patient declined     Sexually Abused: Patient declined   Housing Stability: Low Risk  (7/12/2025)    Housing Stability Vital Sign     Unable to Pay for Housing in the Last Year: No     Number of Times Moved in the Last Year: 1     Homeless in the Last Year: No       Allergies as of 07/22/2025 - Reviewed 07/22/2025   Allergen Reaction Noted    Amoxicillin Rash and Itching 02/26/2022    Penicillin g Rash and Itching 10/20/2020          Current medications as of today Current Medications[3]      Physical Exam:   Gen:           Alert and oriented, No apparent distress. Mood and affect appropriate, normal interaction with examiner.   Hearing:     Grossly intact.  Nose:          Normal, no lesions or deformities.  Dentition:    poor dentition.   Oropharynx:   Tongue normal, posterior pharynx without erythema or exudate.  Mallampati Classification: IV  Neck:        Supple, trachea midline, no masses.  Respiratory Effort: No intercostal retractions or use of accessory muscles.   Gait and Station: Altered  Digits and Nails: No clubbing, cyanosis, petechiae, or nodes.   Skin:        No rashes, lesions or ulcers noted.               Ext:           No cyanosis or edema.      Immunizations:  Flu: 10/31/2024  Pneumovax 23: 11/20/2018  Prevnar 13: 11/7/2024  PCV 20: 11/7/2024  SARS CoV2 Vaccine: 12/15/2021, 5/24/2021, 4/22/2021    Assessment / Plan:  1. Obesity hypoventilation syndrome (HCC)  - Polysomnography Titration    2. KATHIA (obstructive sleep apnea)  - Polysomnography Titration    3. Chronic respiratory failure with hypoxia (HCC)  - Polysomnography Titration    4. Body mass index 50.0-59.9, adult (HCC)    Patient with chronic respiratory failure with  hypoxia obstructive sleep apnea and obesity hypoventilation syndrome.  Previously on iVAPs  therapy but patient has been unable to obtain replacement.  Will update titration study yet again with CO2 monitoring.  Short-term follow-up to review results and order appropriate therapy.  It is reported that there were insufficient indications for therapy per Medicaid.  Patient with multiple ED visits for respiratory failure.  Will continue to use O2 at night pending review of results and further evaluation.  Patient states understanding.      Follow-up:   Return in about 3 months (around 10/22/2025) for Return with Jessica Whitfield PA-C.    Please note that this dictation was created using voice recognition software. I have made every reasonable attempt to correct obvious errors, but it is possible there are errors of grammar and possibly content that I did not discover before finalizing the note.             [1]   Past Medical History:  Diagnosis Date    A-fib (HCC)     MONI (acute kidney injury) (HCC) 08/09/2023    Asthma     Bipolar 2 disorder (HCC)     Chickenpox     Chronic obstructive pulmonary disease (HCC)     Congestive heart failure (HCC)     Hypertension     On home oxygen therapy     Other psychological stress     Schizophrenic disorder (HCC)     Yeast infection of the skin 04/01/2021   [2]   Past Surgical History:  Procedure Laterality Date    ORIF, ANKLE Left 11/2/2024    Procedure: ORIF, ANKLE- TALUS;  Surgeon: Roman Rosa M.D.;  Location: SURGERY [facility];  Service: Orthopedics    CHOLECYSTECTOMY      COLECTOMY      HYSTERECTOMY LAPAROSCOPY      KNEE ARTHROSCOPY Left    [3]   Current Outpatient Medications   Medication Sig Dispense Refill    montelukast (SINGULAIR) 10 MG Tab TAKE 1 TABLET BY MOUTH ONCE DAILY 90 Tablet 1    ADVAIR DISKUS 100-50 MCG/ACT AEROSOL POWDER, BREATH ACTIVATED INHALE 1 PUFF BY MOUTH EVERY 12 HOURS. RINSE MOUTH AFTER EACH USE. 180 Each 1    apixaban (ELIQUIS) 5 MG Tab Take 1  Tablet by mouth 2 times a day. 60 Tablet 3    ondansetron (ZOFRAN ODT) 4 MG TABLET DISPERSIBLE Take 1 Tablet by mouth every 6 hours as needed for Nausea/Vomiting. 10 Tablet 0    guaiFENesin ER (MUCINEX) 600 MG TABLET SR 12 HR Take 1 Tablet by mouth 2 times a day as needed for Cough. 10 Tablet 0    albuterol 108 (90 Base) MCG/ACT Aero Soln inhalation aerosol Inhale 2 Puffs every 6 hours as needed for Shortness of Breath. 18 g 0    metoprolol SR (TOPROL XL) 50 MG TABLET SR 24 HR Take 1 Tablet by mouth every day. 90 Tablet 3    cyclobenzaprine (FLEXERIL) 10 mg Tab Take 1 Tablet by mouth 3 times a day as needed for Moderate Pain or Muscle Spasms. 15 Tablet 1    gabapentin (NEURONTIN) 300 MG Cap Take 1 Capsule by mouth 3 times a day.      hydrOXYzine HCl (ATARAX) 25 MG Tab Take 1 Tablet by mouth 3 times a day as needed for Itching.      Vitamin D, Cholecalciferol, (CHOLECALCIFEROL) 25 MCG (1000 UT) Tab Take 2 Tablets by mouth every day. 60 Tablet 0    ascorbic acid (VITAMIN C) 500 MG tablet Take 500 mg by mouth every day.      ferrous sulfate 325 (65 Fe) MG tablet Take 325 mg by mouth every 48 hours.      ketoconazole (NIZORAL) 2 % Cream Apply 1 Application topically 2 times a day.      lidocaine (LIDODERM) 5 % Patch Place 1 Patch on the skin every 12 hours.      fluticasone (FLONASE) 50 MCG/ACT nasal spray Administer 2 Sprays into each nostril every morning.      aripiprazole (ABILIFY) 30 MG tablet Take 1 Tablet by mouth every morning. Indications: Major Depressive Disorder 30 Tablet 0    atorvastatin (LIPITOR) 20 MG Tab Take 1 Tablet by mouth at bedtime. 30 Tablet 0     No current facility-administered medications for this visit.

## 2025-07-22 NOTE — PATIENT INSTRUCTIONS
1-patient remains on nocturnal oxygen only  2-has been unable to obtain ordered iVaps for obesity hypoventilation syndrome, KATHIA, nocturnal hypoxia  3-update titration study in lab with CO2 monitoring   4-short term follow up to review results and order appropriate PAP therapy   5-last study in 2022 on cpap, bipap and iVaps  6-dedicated iVaps study in 2024  7-reported insufficient indication for therapy per Medicaid  8-continues to use oxygen at night with stated benefit

## 2025-07-23 ENCOUNTER — PHARMACY VISIT (OUTPATIENT)
Dept: PHARMACY | Facility: MEDICAL CENTER | Age: 50
End: 2025-07-23
Payer: COMMERCIAL

## 2025-07-23 VITALS
OXYGEN SATURATION: 95 % | WEIGHT: 284.39 LBS | HEART RATE: 74 BPM | DIASTOLIC BLOOD PRESSURE: 82 MMHG | SYSTOLIC BLOOD PRESSURE: 127 MMHG | TEMPERATURE: 98.4 F | HEIGHT: 62 IN | RESPIRATION RATE: 16 BRPM | BODY MASS INDEX: 52.33 KG/M2

## 2025-07-23 PROCEDURE — RXMED WILLOW AMBULATORY MEDICATION CHARGE: Performed by: EMERGENCY MEDICINE

## 2025-07-23 RX ORDER — METHYLPREDNISOLONE 4 MG/1
TABLET ORAL
Qty: 21 EACH | Refills: 0 | Status: SHIPPED | OUTPATIENT
Start: 2025-07-23 | End: 2025-07-27 | Stop reason: SDUPTHER

## 2025-07-23 RX ORDER — ACETAMINOPHEN 325 MG/1
650 TABLET ORAL EVERY 6 HOURS PRN
Qty: 30 TABLET | Refills: 0 | Status: SHIPPED | OUTPATIENT
Start: 2025-07-23

## 2025-07-23 RX ORDER — LIDOCAINE 4 G/G
1 PATCH TOPICAL EVERY 24 HOURS
Qty: 10 PATCH | Refills: 0 | Status: SHIPPED | OUTPATIENT
Start: 2025-07-23 | End: 2025-08-02

## 2025-07-23 RX ORDER — CYCLOBENZAPRINE HCL 10 MG
10 TABLET ORAL 3 TIMES DAILY PRN
Qty: 30 TABLET | Refills: 0 | Status: SHIPPED | OUTPATIENT
Start: 2025-07-23

## 2025-07-23 NOTE — ED PROVIDER NOTES
ED Provider Note    CHIEF COMPLAINT  Chief Complaint   Patient presents with    Back Pain     States her sciatica is acting up denies pain intervention from EMS pt power went out at group home and decided to come back here after recent D/C       EXTERNAL RECORDS REVIEWED  Outpatient Notes patient has a history of chronic COPD and is on 2 L nasal cannula usually at night for comfort. Has been seen in our hospital multiple times recently does have a history of paroxysmal A-fib was seen and admitted from 7/11 for shortness of breath and chest pain.    HPI/ROS  LIMITATION TO HISTORY   Select: : None  OUTSIDE HISTORIAN(S):  EMS brought in for back pain no interventions given be on oxygen    Lorene Haro is a 49 y.o. female who presents to the emergency department chief complaint of low back pain rating down the right leg.  She told triage that her sciatica is acting up then tells me she is never had sciatica before.  The patient states that she had this back pain that goes from her mid to right lower back and then down the right leg.  She denies falling today she denies trauma weakness numbness or tingling.  She did not take anything for pain.  The other complaint is that the group home she lives and the power went out so she could not put on her oxygen tonight to go to bed.    PAST MEDICAL HISTORY   has a past medical history of A-fib ([facility]), MONI (acute kidney injury) ([facility]) (08/09/2023), Asthma, Bipolar 2 disorder ([facility]), Chickenpox, Chronic obstructive pulmonary disease ([facility]), Congestive heart failure ([facility]), Hypertension, On home oxygen therapy, Other psychological stress, Schizophrenic disorder ([facility]), and Yeast infection of the skin (04/01/2021).    SURGICAL HISTORY   has a past surgical history that includes hysterectomy laparoscopy; colectomy; cholecystectomy; knee arthroscopy (Left); and orif, ankle (Left, 11/2/2024).    FAMILY HISTORY  Family History   Problem Relation Age of Onset    No Known Problems Mother   "   Cancer Sister        SOCIAL HISTORY  Social History     Tobacco Use    Smoking status: Former     Current packs/day: 0.00     Types: Cigarettes     Quit date: 10/12/2022     Years since quittin.7    Smokeless tobacco: Never   Vaping Use    Vaping status: Never Used   Substance and Sexual Activity    Alcohol use: Not Currently    Drug use: Never    Sexual activity: Not Currently       CURRENT MEDICATIONS  Home Medications       Reviewed by Marlene Galvan R.N. (Registered Nurse) on 25 at   Med List Status: <None>     Medication Last Dose Status   ADVAIR DISKUS 100-50 MCG/ACT AEROSOL POWDER, BREATH ACTIVATED  Active   albuterol 108 (90 Base) MCG/ACT Aero Soln inhalation aerosol  Active   apixaban (ELIQUIS) 5 MG Tab  Active   aripiprazole (ABILIFY) 30 MG tablet  Active   ascorbic acid (VITAMIN C) 500 MG tablet  Active   atorvastatin (LIPITOR) 20 MG Tab  Active   cyclobenzaprine (FLEXERIL) 10 mg Tab  Active   ferrous sulfate 325 (65 Fe) MG tablet  Active   fluticasone (FLONASE) 50 MCG/ACT nasal spray  Active   gabapentin (NEURONTIN) 300 MG Cap  Active   guaiFENesin ER (MUCINEX) 600 MG TABLET SR 12 HR  Active   hydrOXYzine HCl (ATARAX) 25 MG Tab  Active   ketoconazole (NIZORAL) 2 % Cream  Active   lidocaine (LIDODERM) 5 % Patch  Active   metoprolol SR (TOPROL XL) 50 MG TABLET SR 24 HR  Active   montelukast (SINGULAIR) 10 MG Tab  Active   ondansetron (ZOFRAN ODT) 4 MG TABLET DISPERSIBLE  Active   Vitamin D, Cholecalciferol, (CHOLECALCIFEROL) 25 MCG (1000 UT) Tab  Active                    ALLERGIES  Allergies[1]    PHYSICAL EXAM  VITAL SIGNS: /74   Pulse 70   Temp 36.9 °C (98.4 °F) (Temporal)   Resp 18   Ht 1.575 m (5' 2\")   Wt (!) 129 kg (284 lb 6.3 oz)   LMP  (LMP Unknown)   SpO2 94%   BMI 52.02 kg/m²    Pulse OX: Pulse Oxygen level is within normal limits on 2 L nasal cannula  Constitutional: Alert in no apparent distress.  Obese female  HENT: Normocephalic, Atraumatic, MMM  Eyes: PERound. " Conjunctiva normal, non-icteric.   Heart: Regular rate and rhythm, intact distal pulses DP pulses 2+  Lungs: Symmetrical movement, no resp distress   Abdomen: Non-tender, non-distended, normal bowel sounds  EXT/Back mild tenderness over the right SI joint no specific midline tenderness edema step-offs or swelling sensation intact to light touch distally negative straight leg test bilaterally  Skin: Warm, Dry, No erythema, No rash.   Neurologic: Alert and oriented, Grossly non-focal.         COURSE & MEDICAL DECISION MAKING    ASSESSMENT, COURSE AND PLAN  Care Narrative:     Patient is a 49-year-old female who presents to the emergency department with complaints of low back pain and sciatic on the right side.  She was not hypoxic when she was awake when she showed up and is not complaining of shortness of breath.  Has negative straight leg test and no red flags no weakness numbness tingling fevers or chills or recent surgeries.  She was treated with Tylenol Flexeril single dose of hydrocodone and a lidocaine patch.    DISPOSITION AND DISCUSSIONS  11:50 PM  I spoke with my  at this time who is confirmed the patient's power is back on at her group home and can go home this evening and will have access to her evening oxygen       12:04 AM  I reassessed patient at the bedside.  She is feeling better with her pain at this time.  We discussed pain manage for sciatica she is not a diabetic and this will be sent home on a Medrol Dosepak.  She will also be given Tylenol Flexeril and lidocaine patches.  We discussed the importance of return precautions and symptoms to watch out for and ice packs instead of heat at home.  She understands feels comfortable being discharged back to her group home        I have discussed management of the patient with the following physicians and JAY's:  none    Discussion of management with other QHP or appropriate source(s): Social Work for help with dispo     Escalation of care  considered, and ultimately not performed:Laboratory analysis and diagnostic imaging    Barriers to care at this time, including but not limited to: na.     Decision tools and prescription drugs considered including, but not limited to: Pain Medications were prescribed + medrol dosepak.    The patient will return for new or worsening symptoms and is stable at the time of discharge.    The patient is referred to a primary physician for blood pressure management, diabetic screening, and for all other preventative health concerns.    DISPOSITION:  Patient will be discharged home in stable condition.    FOLLOW UP:  Lili Reddy M.D.  [address]  [address]  [facility] [id number] [phone]    Schedule an appointment as soon as possible for a visit       [facility], Emergency Dept  [address] [facility] [address] [phone]    If symptoms worsen      OUTPATIENT MEDICATIONS:  New Prescriptions    ACETAMINOPHEN (TYLENOL) 325 MG TAB    Take 2 Tablets by mouth every 6 hours as needed for Moderate Pain (severe pain).    CYCLOBENZAPRINE (FLEXERIL) 10 MG TAB    Take 1 Tablet by mouth 3 times a day as needed for Moderate Pain or Muscle Spasms.    LIDOCAINE (ASPERFLEX) 4 %    Place 1 Patch on the skin every 24 hours for 10 days.    METHYLPREDNISOLONE (MEDROL DOSEPAK) 4 MG TABLET THERAPY PACK    Use as directed         FINAL DIAGNOSIS  1. Acute midline low back pain with right-sided sciatica         Electronically signed by: Candis Mon M.D., 7/22/2025 9:55 PM           [1]   Allergies  Allergen Reactions    Amoxicillin Rash and Itching     Tolerates cephalosporins, pt reports that she gets a rash all over her body and gets itchy    Penicillin G Rash and Itching     pt reports that she gets a rash all over her body and gets itchy

## 2025-07-23 NOTE — ED TRIAGE NOTES
"Chief Complaint   Patient presents with    Back Pain     States her sciatica is acting up denies pain intervention from EMS pt power went out at group home and decided to come back here after recent D/C     BIBA for above to triage. Placed in WC and on 2L NC for comfort     /74   Pulse 70   Temp 36.9 °C (98.4 °F) (Temporal)   Resp 18   Ht 1.575 m (5' 2\")   Wt (!) 129 kg (284 lb 6.3 oz)   LMP  (LMP Unknown)   SpO2 94%   BMI 52.02 kg/m²     "

## 2025-07-23 NOTE — DISCHARGE PLANNING
Medical Social Work    MSW received a voalte message from ERP that pt is reporting a power outage at her group home and uses oxygen.  MSW contacted group home owner, Shirley ([phone]) who confirmed that their power was restored and that pt only uses oxygen at night when she sleeps.  Pt may return to group home.  ERP updated.

## 2025-07-23 NOTE — ED NOTES
Patient discharged home per ERP.  Discharge teaching and education discussed with patient. POC discussed.   Patient verbalized understanding of discharge teaching and education. No other questions at this time.     RX sent from pt's pharmacy.      VSS. Patient alert and oriented. Patient arranged ride for self. Able to ambulate off unit safely with steady gait.

## 2025-07-24 ENCOUNTER — APPOINTMENT (OUTPATIENT)
Dept: RADIOLOGY | Facility: MEDICAL CENTER | Age: 50
End: 2025-07-24
Attending: EMERGENCY MEDICINE
Payer: MEDICAID

## 2025-07-24 ENCOUNTER — HOSPITAL ENCOUNTER (EMERGENCY)
Facility: MEDICAL CENTER | Age: 50
End: 2025-07-24
Attending: EMERGENCY MEDICINE
Payer: MEDICAID

## 2025-07-24 VITALS
RESPIRATION RATE: 17 BRPM | TEMPERATURE: 96.9 F | HEART RATE: 64 BPM | HEIGHT: 62 IN | WEIGHT: 284 LBS | DIASTOLIC BLOOD PRESSURE: 61 MMHG | SYSTOLIC BLOOD PRESSURE: 135 MMHG | BODY MASS INDEX: 52.26 KG/M2 | OXYGEN SATURATION: 95 %

## 2025-07-24 DIAGNOSIS — J06.9 UPPER RESPIRATORY TRACT INFECTION, UNSPECIFIED TYPE: Primary | ICD-10-CM

## 2025-07-24 LAB
APPEARANCE UR: CLEAR
BILIRUB UR QL STRIP.AUTO: NEGATIVE
COLOR UR: YELLOW
FLUAV RNA SPEC QL NAA+PROBE: NEGATIVE
FLUBV RNA SPEC QL NAA+PROBE: NEGATIVE
GLUCOSE UR STRIP.AUTO-MCNC: NEGATIVE MG/DL
KETONES UR STRIP.AUTO-MCNC: NEGATIVE MG/DL
LEUKOCYTE ESTERASE UR QL STRIP.AUTO: NEGATIVE
MICRO URNS: NORMAL
NITRITE UR QL STRIP.AUTO: NEGATIVE
PH UR STRIP.AUTO: 7 [PH] (ref 5–8)
PROT UR QL STRIP: NEGATIVE MG/DL
RBC UR QL AUTO: NEGATIVE
RSV RNA SPEC QL NAA+PROBE: NEGATIVE
SARS-COV-2 RNA RESP QL NAA+PROBE: NEGATIVE
SP GR UR STRIP.AUTO: 1
UROBILINOGEN UR STRIP.AUTO-MCNC: 0.2 EU/DL

## 2025-07-24 PROCEDURE — 81003 URINALYSIS AUTO W/O SCOPE: CPT

## 2025-07-24 PROCEDURE — 0241U POC COV-2, FLU A/B, RSV BY PCR: CPT | Performed by: EMERGENCY MEDICINE

## 2025-07-24 PROCEDURE — 71045 X-RAY EXAM CHEST 1 VIEW: CPT

## 2025-07-24 PROCEDURE — 99284 EMERGENCY DEPT VISIT MOD MDM: CPT

## 2025-07-24 ASSESSMENT — PAIN DESCRIPTION - PAIN TYPE: TYPE: ACUTE PAIN

## 2025-07-24 ASSESSMENT — FIBROSIS 4 INDEX: FIB4 SCORE: 1.36

## 2025-07-25 NOTE — ED PROVIDER NOTES
ED Provider Note    CHIEF COMPLAINT  Chief Complaint   Patient presents with    Flu Like Symptoms     Started this morning, cough/generalized body aches/HA       EXTERNAL RECORDS REVIEWED  Reviewed previous ED visits.    HPI/ROS  LIMITATION TO HISTORY   Select: : None  OUTSIDE HISTORIAN(S):  None.    Lorene Haro is a 49 y.o. female who presents to the emergency department complaining of sore throat, nasal congestion, achiness and headache concerned that she might be getting sick.  She has no chest pain, no shortness of breath.  States she does have a cough.  States she is on oxygen at night.  Denies any fevers or chills.  Nuys any other acute concerns or complaints.  No wheezing.    PAST MEDICAL HISTORY   has a past medical history of A-fib ([facility]), MONI (acute kidney injury) ([facility]) (2023), Asthma, Bipolar 2 disorder ([facility]), Chickenpox, Chronic obstructive pulmonary disease ([facility]), Congestive heart failure ([facility]), Hypertension, On home oxygen therapy, Other psychological stress, Schizophrenic disorder ([facility]), and Yeast infection of the skin (2021).    SURGICAL HISTORY   has a past surgical history that includes hysterectomy laparoscopy; colectomy; cholecystectomy; knee arthroscopy (Left); and orif, ankle (Left, 2024).    FAMILY HISTORY  Family History   Problem Relation Age of Onset    No Known Problems Mother     Cancer Sister        SOCIAL HISTORY  Social History     Tobacco Use    Smoking status: Former     Current packs/day: 0.00     Types: Cigarettes     Quit date: 10/12/2022     Years since quittin.7    Smokeless tobacco: Never   Vaping Use    Vaping status: Never Used   Substance and Sexual Activity    Alcohol use: Not Currently    Drug use: Never    Sexual activity: Not Currently       CURRENT MEDICATIONS  Home Medications       Reviewed by Lidia Baker R.N. (Registered Nurse) on 25 at 1851  Med List Status: Partial     Medication Last Dose Status   acetaminophen (TYLENOL) 325 MG  "Tab  Active   ADVAIR DISKUS 100-50 MCG/ACT AEROSOL POWDER, BREATH ACTIVATED  Active   albuterol 108 (90 Base) MCG/ACT Aero Soln inhalation aerosol  Active   apixaban (ELIQUIS) 5 MG Tab  Active   aripiprazole (ABILIFY) 30 MG tablet  Active   ascorbic acid (VITAMIN C) 500 MG tablet  Active   atorvastatin (LIPITOR) 20 MG Tab  Active   cyclobenzaprine (FLEXERIL) 10 MG Tab  Active   ferrous sulfate 325 (65 Fe) MG tablet  Active   fluticasone (FLONASE) 50 MCG/ACT nasal spray  Active   gabapentin (NEURONTIN) 300 MG Cap  Active   guaiFENesin ER (MUCINEX) 600 MG TABLET SR 12 HR  Active   hydrOXYzine HCl (ATARAX) 25 MG Tab  Active   ketoconazole (NIZORAL) 2 % Cream  Active   lidocaine (ASPERFLEX) 4 %  Active   methylPREDNISolone (MEDROL DOSEPAK) 4 MG Tablet Therapy Pack  Active   metoprolol SR (TOPROL XL) 50 MG TABLET SR 24 HR  Active   montelukast (SINGULAIR) 10 MG Tab  Active   ondansetron (ZOFRAN ODT) 4 MG TABLET DISPERSIBLE  Active   Vitamin D, Cholecalciferol, (CHOLECALCIFEROL) 25 MCG (1000 UT) Tab  Active                  Audit from Redirected Encounters    **Home medications have not yet been reviewed for this encounter**         ALLERGIES  Allergies[1]    PHYSICAL EXAM  VITAL SIGNS: BP (!) 154/69   Pulse 66   Temp 36.1 °C (96.9 °F) (Temporal)   Resp 20   Ht 1.575 m (5' 2\")   Wt (!) 129 kg (284 lb)   LMP  (LMP Unknown)   SpO2 95%   BMI 51.94 kg/m²    Constitutional: Awake alert nontoxic no acute distress  HENT: Normocephalic, Atraumatic, Bilateral external ears normal, no rhinorrhea.  Minimal pharyngeal erythema without exudates.  Eyes: PERRL, EOMI, Conjunctiva normal, No discharge.   Neck: Normal range of motion,  Cardiovascular: Normal heart rate, Normal rhythm, No murmurs, No rubs, No gallops.   Thorax & Lungs: Normal breath sounds, No respiratory distress, No wheezing,   Abdomen: Soft nontender nondistended.  Skin: Warm, Dry, No erythema, No rash.   Musculoskeletal: Good range of motion in all major joints. "   Neurologic: Alert, No focal deficits noted.   Psychiatric: Affect normal      EKG/LABS  Results for orders placed or performed during the hospital encounter of 07/24/25   POC CoV-2, FLU A/B, RSV by PCR    Collection Time: 07/24/25  6:54 PM   Result Value Ref Range    POC Influenza A RNA, PCR NEGATIVE NEGATIVE    POC Influenza B RNA, PCR NEGATIVE NEGATIVE    POC RSV, by PCR NEGATIVE NEGATIVE    POC SARS-CoV-2, PCR NEGATIVE NEGATIVE   URINALYSIS CULTURE, IF INDICATED    Collection Time: 07/24/25  7:39 PM    Specimen: Urine, Clean Catch   Result Value Ref Range    Color Yellow     Character Clear     Specific Gravity 1.004 <1.035    Ph 7.0 5.0 - 8.0    Glucose Negative Negative mg/dL    Ketones Negative Negative mg/dL    Protein Negative Negative mg/dL    Bilirubin Negative Negative    Urobilinogen, Urine 0.2 <=1.0 EU/dL    Nitrite Negative Negative    Leukocyte Esterase Negative Negative    Occult Blood Negative Negative    Micro Urine Req see below      *Note: Due to a large number of results and/or encounters for the requested time period, some results have not been displayed. A complete set of results can be found in Results Review.       I have independently interpreted this EKG    RADIOLOGY/PROCEDURES   I have independently interpreted the diagnostic imaging associated with this visit and am waiting the final reading from the radiologist.   My preliminary interpretation is as follows:     Radiologist interpretation:  DX-CHEST-PORTABLE (1 VIEW)   Final Result      1.  Hypoinflation with bilateral subsegmental atelectasis.   2.  No pneumonia or pneumothorax.   3.  Stable cardiomegaly.          COURSE & MEDICAL DECISION MAKING    ASSESSMENT, COURSE AND PLAN  Care Narrative:         49-year-old female with a history of schizophrenia, asthma, heart failure and multiple comorbidities presents to the ED complaining of cough and cold symptoms.  She is currently afebrile and nontoxic and is no signs of respiratory  distress.  When I see her she is satting in the low 90s and high 80s on room air but usually goes up on her baseline 2 L of oxygen.    She seen and examined a broad initial diagnosis was considered including but not limited to URI, pneumonia, asthma exacerbation, UTI and other sources of infection    COVID test is unremarkable.  Lungs do not show an obvious pneumonia.  Her lungs are clear I do not hear any crackles.  She has no wheezes.  I did a urinalysis is unremarkable.        At this point the patient looks well and she seems to be at baseline.  I think she may determine return precautions and follow-up.  Advise rest, drink plenty of fluids, take Tylenol per for pain.    She is administered and ibuprofen on her way in.    Are answered.  She is agreeable to plan.      DISPOSITION AND DISCUSSIONS    Escalation of care considered, and ultimately not performed: Consider blood test.  Also consider bronchodilators but the patient has no wheezing or signs of increased work of breathing.      Lili Reddy M.D.  [address]  [address]  [place] [facility] [address]  [phone]                FINAL DIAGNOSIS  1. Upper respiratory tract infection, unspecified type         Electronically signed by: Juan Gibson M.D., 7/24/2025 7:52 PM           [1]   Allergies  Allergen Reactions    Amoxicillin Rash and Itching     Tolerates cephalosporins, pt reports that she gets a rash all over her body and gets itchy    Penicillin G Rash and Itching     pt reports that she gets a rash all over her body and gets itchy

## 2025-07-25 NOTE — ED NOTES
Reviewed discharge information with pt. Pt verbalized understanding of all information given. PIV removed, pt discharged with all belongings, pt ambulatory with a steady gait with personal walker.

## 2025-07-25 NOTE — ED TRIAGE NOTES
Chief Complaint   Patient presents with    Flu Like Symptoms     Started this morning, cough/generalized body aches/HA     BIB EMS from group home for above. A+O x 4, GCS 15    PTA: 600 mg ibuprofen, 1 g tylenol, 4mg ODT zofran

## 2025-07-25 NOTE — ED NOTES
Urine specimen collected and sent to lab. Pt ambulated to and from restroom, pt SOB with exertion.

## 2025-07-25 NOTE — DISCHARGE INSTRUCTIONS
Rest, drink plenty of fluids.  Return to the ED for worsening symptoms, if you have shortness of breath, fever, or other concerns.  Otherwise follow-up with your doctor.

## 2025-07-27 ENCOUNTER — APPOINTMENT (OUTPATIENT)
Dept: RADIOLOGY | Facility: MEDICAL CENTER | Age: 50
End: 2025-07-27
Attending: EMERGENCY MEDICINE
Payer: MEDICAID

## 2025-07-27 ENCOUNTER — APPOINTMENT (OUTPATIENT)
Dept: RADIOLOGY | Facility: MEDICAL CENTER | Age: 50
End: 2025-07-27
Payer: MEDICAID

## 2025-07-27 ENCOUNTER — HOSPITAL ENCOUNTER (EMERGENCY)
Facility: MEDICAL CENTER | Age: 50
End: 2025-07-27
Attending: EMERGENCY MEDICINE
Payer: MEDICAID

## 2025-07-27 VITALS
TEMPERATURE: 98.1 F | BODY MASS INDEX: 50.43 KG/M2 | SYSTOLIC BLOOD PRESSURE: 140 MMHG | RESPIRATION RATE: 15 BRPM | OXYGEN SATURATION: 89 % | HEART RATE: 90 BPM | HEIGHT: 62 IN | DIASTOLIC BLOOD PRESSURE: 62 MMHG | WEIGHT: 274.03 LBS

## 2025-07-27 DIAGNOSIS — R06.02 SHORTNESS OF BREATH: Primary | ICD-10-CM

## 2025-07-27 LAB
ALBUMIN SERPL BCP-MCNC: 4.1 G/DL (ref 3.2–4.9)
ALBUMIN/GLOB SERPL: 1.5 G/DL
ALP SERPL-CCNC: 115 U/L (ref 30–99)
ALT SERPL-CCNC: 23 U/L (ref 2–50)
ANION GAP SERPL CALC-SCNC: 12 MMOL/L (ref 7–16)
ANISOCYTOSIS BLD QL SMEAR: ABNORMAL
AST SERPL-CCNC: 25 U/L (ref 12–45)
BASOPHILS # BLD AUTO: 0.1 % (ref 0–1.8)
BASOPHILS # BLD: 0.01 K/UL (ref 0–0.12)
BILIRUB SERPL-MCNC: 0.6 MG/DL (ref 0.1–1.5)
BUN SERPL-MCNC: 20 MG/DL (ref 8–22)
CALCIUM ALBUM COR SERPL-MCNC: 9 MG/DL (ref 8.5–10.5)
CALCIUM SERPL-MCNC: 9.1 MG/DL (ref 8.5–10.5)
CHLORIDE SERPL-SCNC: 103 MMOL/L (ref 96–112)
CO2 SERPL-SCNC: 25 MMOL/L (ref 20–33)
COMMENT 1642: NORMAL
CREAT SERPL-MCNC: 0.99 MG/DL (ref 0.5–1.4)
EKG IMPRESSION: NORMAL
EOSINOPHIL # BLD AUTO: 0.01 K/UL (ref 0–0.51)
EOSINOPHIL NFR BLD: 0.1 % (ref 0–6.9)
ERYTHROCYTE [DISTWIDTH] IN BLOOD BY AUTOMATED COUNT: 52 FL (ref 35.9–50)
GFR SERPLBLD CREATININE-BSD FMLA CKD-EPI: 69 ML/MIN/1.73 M 2
GLOBULIN SER CALC-MCNC: 2.8 G/DL (ref 1.9–3.5)
GLUCOSE SERPL-MCNC: 205 MG/DL (ref 65–99)
HCT VFR BLD AUTO: 38.7 % (ref 37–47)
HGB BLD-MCNC: 11.5 G/DL (ref 12–16)
IMM GRANULOCYTES # BLD AUTO: 0.08 K/UL (ref 0–0.11)
IMM GRANULOCYTES NFR BLD AUTO: 0.8 % (ref 0–0.9)
LYMPHOCYTES # BLD AUTO: 1.03 K/UL (ref 1–4.8)
LYMPHOCYTES NFR BLD: 10.5 % (ref 22–41)
MCH RBC QN AUTO: 26.4 PG (ref 27–33)
MCHC RBC AUTO-ENTMCNC: 29.7 G/DL (ref 32.2–35.5)
MCV RBC AUTO: 89 FL (ref 81.4–97.8)
MICROCYTES BLD QL SMEAR: ABNORMAL
MONOCYTES # BLD AUTO: 0.33 K/UL (ref 0–0.85)
MONOCYTES NFR BLD AUTO: 3.4 % (ref 0–13.4)
MORPHOLOGY BLD-IMP: NORMAL
NEUTROPHILS # BLD AUTO: 8.37 K/UL (ref 1.82–7.42)
NEUTROPHILS NFR BLD: 85.1 % (ref 44–72)
NRBC # BLD AUTO: 0 K/UL
NRBC BLD-RTO: 0 /100 WBC (ref 0–0.2)
NT-PROBNP SERPL IA-MCNC: 574 PG/ML (ref 0–125)
OVALOCYTES BLD QL SMEAR: NORMAL
PLATELET # BLD AUTO: 211 K/UL (ref 164–446)
PLATELET BLD QL SMEAR: NORMAL
PMV BLD AUTO: 11.6 FL (ref 9–12.9)
POIKILOCYTOSIS BLD QL SMEAR: NORMAL
POTASSIUM SERPL-SCNC: 4.9 MMOL/L (ref 3.6–5.5)
PROT SERPL-MCNC: 6.9 G/DL (ref 6–8.2)
RBC # BLD AUTO: 4.35 M/UL (ref 4.2–5.4)
RBC BLD AUTO: PRESENT
SODIUM SERPL-SCNC: 140 MMOL/L (ref 135–145)
TROPONIN T SERPL-MCNC: 32 NG/L (ref 6–19)
WBC # BLD AUTO: 9.8 K/UL (ref 4.8–10.8)

## 2025-07-27 PROCEDURE — 700111 HCHG RX REV CODE 636 W/ 250 OVERRIDE (IP): Mod: UD | Performed by: EMERGENCY MEDICINE

## 2025-07-27 PROCEDURE — 93005 ELECTROCARDIOGRAM TRACING: CPT | Mod: TC | Performed by: EMERGENCY MEDICINE

## 2025-07-27 PROCEDURE — 93005 ELECTROCARDIOGRAM TRACING: CPT | Mod: TC

## 2025-07-27 PROCEDURE — 83880 ASSAY OF NATRIURETIC PEPTIDE: CPT

## 2025-07-27 PROCEDURE — 80053 COMPREHEN METABOLIC PANEL: CPT

## 2025-07-27 PROCEDURE — 85025 COMPLETE CBC W/AUTO DIFF WBC: CPT

## 2025-07-27 PROCEDURE — 700105 HCHG RX REV CODE 258: Mod: UD | Performed by: EMERGENCY MEDICINE

## 2025-07-27 PROCEDURE — 94644 CONT INHLJ TX 1ST HOUR: CPT

## 2025-07-27 PROCEDURE — 73501 X-RAY EXAM HIP UNI 1 VIEW: CPT | Mod: RT

## 2025-07-27 PROCEDURE — 99285 EMERGENCY DEPT VISIT HI MDM: CPT

## 2025-07-27 PROCEDURE — 700101 HCHG RX REV CODE 250: Mod: UD | Performed by: EMERGENCY MEDICINE

## 2025-07-27 PROCEDURE — 71045 X-RAY EXAM CHEST 1 VIEW: CPT

## 2025-07-27 PROCEDURE — 36415 COLL VENOUS BLD VENIPUNCTURE: CPT

## 2025-07-27 PROCEDURE — 94645 CONT INHLJ TX EACH ADDL HOUR: CPT

## 2025-07-27 PROCEDURE — 84484 ASSAY OF TROPONIN QUANT: CPT

## 2025-07-27 RX ORDER — ALBUTEROL SULFATE 90 UG/1
2 INHALANT RESPIRATORY (INHALATION) EVERY 6 HOURS PRN
Qty: 18 G | Refills: 0 | Status: SHIPPED | OUTPATIENT
Start: 2025-07-27

## 2025-07-27 RX ORDER — METHYLPREDNISOLONE 4 MG/1
TABLET ORAL
Qty: 21 EACH | Refills: 0 | Status: SHIPPED | OUTPATIENT
Start: 2025-07-27

## 2025-07-27 RX ORDER — PREDNISONE 20 MG/1
60 TABLET ORAL ONCE
Status: COMPLETED | OUTPATIENT
Start: 2025-07-27 | End: 2025-07-27

## 2025-07-27 RX ADMIN — Medication 15 MG/HR: at 20:34

## 2025-07-27 RX ADMIN — PREDNISONE 60 MG: 20 TABLET ORAL at 20:15

## 2025-07-27 ASSESSMENT — FIBROSIS 4 INDEX: FIB4 SCORE: 1.36

## 2025-07-28 ENCOUNTER — HOSPITAL ENCOUNTER (EMERGENCY)
Facility: MEDICAL CENTER | Age: 50
End: 2025-07-28
Attending: EMERGENCY MEDICINE
Payer: MEDICAID

## 2025-07-28 ASSESSMENT — FIBROSIS 4 INDEX: FIB4 SCORE: 1.26

## 2025-07-28 ASSESSMENT — PAIN DESCRIPTION - PAIN TYPE: TYPE: ACUTE PAIN

## 2025-07-28 NOTE — DISCHARGE INSTRUCTIONS
Use your home oxygen at 2 L/min through the day and at all times.    Continue the steroid medication, continue using albuterol.  Return for change or worsening symptoms.    Use Motrin and Tylenol for the discomfort in your hip and activity as tolerated otherwise.

## 2025-07-28 NOTE — ED PROVIDER NOTES
ER Provider Note    Scribed for Troy Randolph D.O. by Alessandro Marie. 7/27/2025  7:59 PM    Primary Care Provider: Lili Reddy M.D.    CHIEF COMPLAINT  Chief Complaint   Patient presents with    Sore Throat           Shortness of Breath     Hx of COPD       HPI/ROS    Lorene Haro is a 49 y.o. female with history of COPD who presents to the Emergency Department for sore throat with shortness of breath onset earlier today. The patient explains she has been endorsing a hoarse voice secondary to her sore throat. She adds she has been having shortness of breath all day. She reports having an Inhaler that she's uses as needed. She uses 2 liters of oxygen only at night. History of atrial fibrillation.      ROS as per HPI.    PAST MEDICAL HISTORY  Past Medical History[1]    SURGICAL HISTORY  Past Surgical History[2]    FAMILY HISTORY  Family History   Problem Relation Age of Onset    No Known Problems Mother     Cancer Sister        SOCIAL HISTORY   reports that she quit smoking about 2 years ago. Her smoking use included cigarettes. She has never used smokeless tobacco. She reports that she does not currently use alcohol. She reports that she does not use drugs.    CURRENT MEDICATIONS  Previous Medications    ACETAMINOPHEN (TYLENOL) 325 MG TAB    Take 2 Tablets by mouth every 6 hours as needed for Moderate Pain (severe pain).    ADVAIR DISKUS 100-50 MCG/ACT AEROSOL POWDER, BREATH ACTIVATED    INHALE 1 PUFF BY MOUTH EVERY 12 HOURS. RINSE MOUTH AFTER EACH USE.    APIXABAN (ELIQUIS) 5 MG TAB    Take 1 Tablet by mouth 2 times a day.    ARIPIPRAZOLE (ABILIFY) 30 MG TABLET    Take 1 Tablet by mouth every morning. Indications: Major Depressive Disorder    ASCORBIC ACID (VITAMIN C) 500 MG TABLET    Take 500 mg by mouth every day.    ATORVASTATIN (LIPITOR) 20 MG TAB    Take 1 Tablet by mouth at bedtime.    CYCLOBENZAPRINE (FLEXERIL) 10 MG TAB    Take 1 Tablet by mouth 3 times a day as needed for Moderate Pain or Muscle  "Spasms.    FERROUS SULFATE 325 (65 FE) MG TABLET    Take 325 mg by mouth every 48 hours.    FLUTICASONE (FLONASE) 50 MCG/ACT NASAL SPRAY    Administer 2 Sprays into each nostril every morning.    GABAPENTIN (NEURONTIN) 300 MG CAP    Take 1 Capsule by mouth 3 times a day.    GUAIFENESIN ER (MUCINEX) 600 MG TABLET SR 12 HR    Take 1 Tablet by mouth 2 times a day as needed for Cough.    HYDROXYZINE HCL (ATARAX) 25 MG TAB    Take 1 Tablet by mouth 3 times a day as needed for Itching.    KETOCONAZOLE (NIZORAL) 2 % CREAM    Apply 1 Application topically 2 times a day.    LIDOCAINE (ASPERFLEX) 4 %    Place 1 Patch on the skin every 24 hours for 10 days.    METOPROLOL SR (TOPROL XL) 50 MG TABLET SR 24 HR    Take 1 Tablet by mouth every day.    MONTELUKAST (SINGULAIR) 10 MG TAB    TAKE 1 TABLET BY MOUTH ONCE DAILY    ONDANSETRON (ZOFRAN ODT) 4 MG TABLET DISPERSIBLE    Take 1 Tablet by mouth every 6 hours as needed for Nausea/Vomiting.    VITAMIN D, CHOLECALCIFEROL, (CHOLECALCIFEROL) 25 MCG (1000 UT) TAB    Take 2 Tablets by mouth every day.       ALLERGIES  Amoxicillin and Penicillin g    PHYSICAL EXAM  BP (!) 152/66   Pulse 64   Temp 36.7 °C (98.1 °F) (Temporal)   Resp 18   Ht 1.575 m (5' 2\")   Wt 124 kg (274 lb 0.5 oz)   LMP  (LMP Unknown)   SpO2 97%   BMI 50.12 kg/m²     General: No acute distress.BMI greater than 30.   HENT: Normocephalic, Mucus membranes are moist.   Chest: Lungs have even and unlabored respirations, Clear to auscultation.   Cardiovascular: Regular rate and regular rhythm, No peripheral cyanosis.  Abdomen: Non distended.  Neuro: Awake, Conversive, Able to relay recent events.  Psychiatric: Calm and cooperative.     INITIAL ASSESSMENT  Patient has URI symptoms. Has history of COPD. She comes in with complaints of shortness of breath. Was seen here 3-4 days ago with similar symptoms. Has not improved. Will evaluate for pneumonia or infection    ED Observation Status? No; Patient does not meet " criteria for ED Observation.     DIAGNOSTIC STUDIES    Labs:   Results for orders placed or performed during the hospital encounter of 25   EKG    Collection Time: 25  6:33 PM   Result Value Ref Range    Report       [facility]    Test Date:  2025  Pt Name:    ADELINA MILLAN                Department: ER  MRN:        [id number]                      Room:  Gender:     Female                       Technician: [id number]  :        [id number]                   Requested By:ER TRIAGE PROTOCOL  Order #:    [id number]                    Reading MD:    Measurements  Intervals                                Axis  Rate:       65                           P:          74  ID:         [id number]                          QRS:        77  QRSD:       122                          T:          1  QT:         395  QTc:        411    Interpretive Statements  Sinus rhythm  Right bundle branch block  Compared to ECG 2025 19:59:08  Atrial premature complex(es) no longer present  ST (T wave) deviation no longer present  Myocardial infarct finding no longer present     CBC with Differential    Collection Time: 25  6:56 PM   Result Value Ref Range    WBC 9.8 4.8 - 10.8 K/uL    RBC 4.35 4.20 - 5.40 M/uL    Hemoglobin 11.5 (L) 12.0 - 16.0 g/dL    Hematocrit 38.7 37.0 - 47.0 %    MCV 89.0 81.4 - 97.8 fL    MCH 26.4 (L) 27.0 - 33.0 pg    MCHC 29.7 (L) 32.2 - 35.5 g/dL    RDW 52.0 (H) 35.9 - 50.0 fL    Platelet Count 211 164 - 446 K/uL    MPV 11.6 9.0 - 12.9 fL    Neutrophils-Polys 85.10 (H) 44.00 - 72.00 %    Lymphocytes 10.50 (L) 22.00 - 41.00 %    Monocytes 3.40 0.00 - 13.40 %    Eosinophils 0.10 0.00 - 6.90 %    Basophils 0.10 0.00 - 1.80 %    Immature Granulocytes 0.80 0.00 - 0.90 %    Nucleated RBC 0.00 0.00 - 0.20 /100 WBC    Neutrophils (Absolute) 8.37 (H) 1.82 - 7.42 K/uL    Lymphs (Absolute) 1.03 1.00 - 4.80 K/uL    Monos (Absolute) 0.33 0.00 - 0.85 K/uL    Eos (Absolute) 0.01 0.00 -  0.51 K/uL    Baso (Absolute) 0.01 0.00 - 0.12 K/uL    Immature Granulocytes (abs) 0.08 0.00 - 0.11 K/uL    NRBC (Absolute) 0.00 K/uL    Anisocytosis 1+     Microcytosis 1+    Comp Metabolic Panel    Collection Time: 07/27/25  6:56 PM   Result Value Ref Range    Sodium 140 135 - 145 mmol/L    Potassium 4.9 3.6 - 5.5 mmol/L    Chloride 103 96 - 112 mmol/L    Co2 25 20 - 33 mmol/L    Anion Gap 12.0 7.0 - 16.0    Glucose 205 (H) 65 - 99 mg/dL    Bun 20 8 - 22 mg/dL    Creatinine 0.99 0.50 - 1.40 mg/dL    Calcium 9.1 8.5 - 10.5 mg/dL    Correct Calcium 9.0 8.5 - 10.5 mg/dL    AST(SGOT) 25 12 - 45 U/L    ALT(SGPT) 23 2 - 50 U/L    Alkaline Phosphatase 115 (H) 30 - 99 U/L    Total Bilirubin 0.6 0.1 - 1.5 mg/dL    Albumin 4.1 3.2 - 4.9 g/dL    Total Protein 6.9 6.0 - 8.2 g/dL    Globulin 2.8 1.9 - 3.5 g/dL    A-G Ratio 1.5 g/dL   proBrain Natriuretic Peptide, NT    Collection Time: 07/27/25  6:56 PM   Result Value Ref Range    NT-proBNP 574 (H) 0 - 125 pg/mL   Troponin    Collection Time: 07/27/25  6:56 PM   Result Value Ref Range    Troponin T 32 (H) 6 - 19 ng/L   ESTIMATED GFR    Collection Time: 07/27/25  6:56 PM   Result Value Ref Range    GFR (CKD-EPI) 69 >60 mL/min/1.73 m 2   PLATELET ESTIMATE    Collection Time: 07/27/25  6:56 PM   Result Value Ref Range    Plt Estimation Normal    MORPHOLOGY    Collection Time: 07/27/25  6:56 PM   Result Value Ref Range    RBC Morphology Present     Poikilocytosis 1+     Ovalocytes 1+    PERIPHERAL SMEAR REVIEW    Collection Time: 07/27/25  6:56 PM   Result Value Ref Range    Peripheral Smear Review see below    DIFFERENTIAL COMMENT    Collection Time: 07/27/25  6:56 PM   Result Value Ref Range    Comments-Diff see below      *Note: Due to a large number of results and/or encounters for the requested time period, some results have not been displayed. A complete set of results can be found in Results Review.     EKG:   I have independently interpreted the above EKG.    Radiology:    The attending emergency physician has independently interpreted the diagnostic imaging associated with this visit and am waiting the final reading from the radiologist.   Preliminary interpretation is as follows: No pulmonary edema  Radiologist interpretation:   DX-CHEST-PORTABLE (1 VIEW)   Final Result      1.  Hypoinflation with bibasilar atelectasis.   2.  No pneumonia or pulmonary edema.   3.  Stable cardiomegaly.      DX-HIP-UNILATERAL-WITH PELVIS-1 VIEW RIGHT   Final Result      1.  No RIGHT hip or pelvic fracture.   2.  Limited by body habitus.        COURSE & MEDICAL DECISION MAKING     COURSE AND PLAN  7:59 PM - Patient seen and examined at bedside. Discussed plan of care, including labs and imaging. Patient agrees to the plan of care. Ordered for eGFR, Platelet estimate, Morphology, Peripheral smear review, diff comment, cbc with diff, cmp, proBrain Natriuretic Peptide, Troponin and EKG to evaluate her symptoms.     ED Summary: The patient presents with URI type symptoms along with some shortness of breath.  She has oxygen at home that she wears at night.  O2 at 2 L is 92%.  She does need to keep this on through the day.    She was given steroids here, nebulized treatment.  She is not becoming more hypoxic.  With that the patient has no respiratory distress, lungs are clear to auscultation there is no indication for admission at this time.  She is discharged home to continue albuterol, and steroids and return if her symptoms do worsen.    DISPOSITION AND DISCUSSIONS      DISPOSITION:  Discharged home in stable condition    FINAL DIAGNOSIS  1. Shortness of breath        Alessandro HARRIS), am scribing for, and in the presence of, Troy Randolph D.O..    Electronically signed by: Alessandro Hartley), 7/27/2025    Troy HARRIS D.O. personally performed the services described in this documentation, as scribed by Alessandro Marie in my presence, and it is both accurate and complete.     The note  accurately reflects work and decisions made by me.  Troy Randolph D.O.  7/27/2025  9:50 PM         [1]   Past Medical History:  Diagnosis Date    A-fib (HCC)     MONI (acute kidney injury) (HCC) 08/09/2023    Asthma     Bipolar 2 disorder (HCC)     Chickenpox     Chronic obstructive pulmonary disease (HCC)     Congestive heart failure (HCC)     Hypertension     On home oxygen therapy     Other psychological stress     Schizophrenic disorder (HCC)     Yeast infection of the skin 04/01/2021   [2]   Past Surgical History:  Procedure Laterality Date    ORIF, ANKLE Left 11/2/2024    Procedure: ORIF, ANKLE- TALUS;  Surgeon: Roman Rosa M.D.;  Location: SURGERY [facility];  Service: Orthopedics    CHOLECYSTECTOMY      COLECTOMY      HYSTERECTOMY LAPAROSCOPY      KNEE ARTHROSCOPY Left

## 2025-07-28 NOTE — ED NOTES
Pt ambulated to [facility] with walker. Urine sample collected on the way. Chart up for ERP to review.

## 2025-07-28 NOTE — ED NOTES
Patient given instructions, v/u instructions. Pt given MTM sheet and educated on calling process. Pt v/u understanding.  Patient wheeled out of ER to lobby to call MTM. All belongings with patient.

## 2025-07-28 NOTE — ED TRIAGE NOTES
BIB EMS from [facility] with c/o   Chief Complaint   Patient presents with    Sore Throat           Shortness of Breath     Hx of COPD     Reports 5/10 sore throat with associated post nasal drip and hoarse voice. Reports no fever, chills, N/V, or headache. Took 650 mg Tylenol at noon with some relief in discomfort.     Hx of COPD. Reports chest tightness and increased sputum production. Normal EKG in route. SOB protocol ordered.

## 2025-07-29 NOTE — ED NOTES
Discharge instructions reviewed with patient and signed. Instructed on how to  prescriptions. They verbalized understanding of follow up instructions. All belongings with patient. They were W/C by ED tech to ED lobby.

## 2025-07-29 NOTE — DISCHARGE INSTRUCTIONS
Rest is much as possible, drink lots of fluids, hot tea with lemon and honey, warm salt water gargles 3-4 times daily.  Take the medication and prescribing you for your pain with food.  Tylenol if you have a fever greater than 101 and follow-up with your doctor this week for recheck.

## 2025-07-29 NOTE — ED TRIAGE NOTES
"Chief Complaint   Patient presents with    Sore Throat     X2days, +dry cough, denies chills, mild body aches.     Headache     X2 days, with L cheek numbness, 9/10 aching/throbbing HA. Denies N/V       Pt used WC to triage. Pt A&Ox4, on room air. -stroke scale in triage. Covid swab collected and in process.     BIB [facility], no meds given en route.     Pt to lobby . Pt educated on alerting staff in changes to condition. Pt verbalized understanding. Protocol ordered.     BP (!) 141/78   Pulse 63   Temp 36.1 °C (97 °F) (Temporal)   Resp 16   Ht 1.575 m (5' 2\")   Wt 124 kg (273 lb 5.9 oz)   LMP  (LMP Unknown)   SpO2 95%   BMI 50.00 kg/m²     "

## 2025-07-29 NOTE — ED PROVIDER NOTES
ER Provider Note    Scribed for Dr. Albania Mora D.O. by Isha Fernandez. 7/28/2025  8:39 PM    Primary Care Provider: Lili Reddy M.D.    CHIEF COMPLAINT  Chief Complaint   Patient presents with    Sore Throat     X2days, +dry cough, denies chills, mild body aches.     Headache     X2 days, with L cheek numbness, 9/10 aching/throbbing HA. Denies N/V     EXTERNAL RECORDS REVIEWED  Outpatient Notes Patient seen yesterday for shortness of breath and a sore throat. COVID, Flu, RSV negative on 7/24/25    HPI/ROS  LIMITATION TO HISTORY   None    OUTSIDE HISTORIAN(S):  None    Lorene Haro is a 49 y.o. female who presents to the ED for a sore throat onset today. She reports associated dry cough and a dull headache for the last 2 days. Denies chills, body aches, left cheek numbness, nausea and vomiting. She reports maternal history of a stroke. No alleviating or exacerbating factors noted.    PAST MEDICAL HISTORY  Past Medical History[1]    SURGICAL HISTORY  Past Surgical History[2]    FAMILY HISTORY  Family History   Problem Relation Age of Onset    No Known Problems Mother     Cancer Sister      SOCIAL HISTORY   reports that she quit smoking about 2 years ago. Her smoking use included cigarettes. She has never used smokeless tobacco. She reports that she does not currently use alcohol. She reports that she does not use drugs.    CURRENT MEDICATIONS  Previous Medications    ACETAMINOPHEN (TYLENOL) 325 MG TAB    Take 2 Tablets by mouth every 6 hours as needed for Moderate Pain (severe pain).    ADVAIR DISKUS 100-50 MCG/ACT AEROSOL POWDER, BREATH ACTIVATED    INHALE 1 PUFF BY MOUTH EVERY 12 HOURS. RINSE MOUTH AFTER EACH USE.    ALBUTEROL 108 (90 BASE) MCG/ACT AERO SOLN INHALATION AEROSOL    Inhale 2 Puffs by mouth every 6 hours as needed for Shortness of Breath.    APIXABAN (ELIQUIS) 5 MG TAB    Take 1 Tablet by mouth 2 times a day.    ARIPIPRAZOLE (ABILIFY) 30 MG TABLET    Take 1 Tablet by mouth every morning.  "Indications: Major Depressive Disorder    ASCORBIC ACID (VITAMIN C) 500 MG TABLET    Take 500 mg by mouth every day.    ATORVASTATIN (LIPITOR) 20 MG TAB    Take 1 Tablet by mouth at bedtime.    CYCLOBENZAPRINE (FLEXERIL) 10 MG TAB    Take 1 Tablet by mouth 3 times a day as needed for Moderate Pain or Muscle Spasms.    FERROUS SULFATE 325 (65 FE) MG TABLET    Take 325 mg by mouth every 48 hours.    FLUTICASONE (FLONASE) 50 MCG/ACT NASAL SPRAY    Administer 2 Sprays into each nostril every morning.    GABAPENTIN (NEURONTIN) 300 MG CAP    Take 1 Capsule by mouth 3 times a day.    GUAIFENESIN ER (MUCINEX) 600 MG TABLET SR 12 HR    Take 1 Tablet by mouth 2 times a day as needed for Cough.    HYDROXYZINE HCL (ATARAX) 25 MG TAB    Take 1 Tablet by mouth 3 times a day as needed for Itching.    KETOCONAZOLE (NIZORAL) 2 % CREAM    Apply 1 Application topically 2 times a day.    LIDOCAINE (ASPERFLEX) 4 %    Place 1 Patch on the skin every 24 hours for 10 days.    METHYLPREDNISOLONE (MEDROL DOSEPAK) 4 MG TABLET THERAPY PACK    Use as directed on packaging    METOPROLOL SR (TOPROL XL) 50 MG TABLET SR 24 HR    Take 1 Tablet by mouth every day.    MONTELUKAST (SINGULAIR) 10 MG TAB    TAKE 1 TABLET BY MOUTH ONCE DAILY    ONDANSETRON (ZOFRAN ODT) 4 MG TABLET DISPERSIBLE    Take 1 Tablet by mouth every 6 hours as needed for Nausea/Vomiting.    VITAMIN D, CHOLECALCIFEROL, (CHOLECALCIFEROL) 25 MCG (1000 UT) TAB    Take 2 Tablets by mouth every day.     ALLERGIES  Amoxicillin and Penicillin g    PHYSICAL EXAM  BP (!) 141/78   Pulse 63   Temp 36.1 °C (97 °F) (Temporal)   Resp 16   Ht 1.575 m (5' 2\")   Wt 124 kg (273 lb 5.9 oz)   LMP  (LMP Unknown)   SpO2 95%   BMI 50.00 kg/m²   Constitutional: Patient is well developed, well nourished. Non-toxic appearing. No acute distress.   HENT: Normocephalic, atraumatic.  Nares are patent and clear, oral mucosa moist.  Cardiovascular: Normal heart rate and Regular rhythm. No murmur  Thorax & " Lungs: Clear and equal breath sounds with good excursion. No respiratory distress, no rhonchi, wheezing or rales.   Abdomen: Bowel sounds normal in all four quadrants. Soft, obese and nontender.  Skin: Warm, Dry   Extremities: Peripheral pulses 4/4 No edema, No tenderness.    Neurologic: Alert & oriented x 3, Normal motor function, Normal sensory function  Psychiatric: Affect normal, Judgment mentally challenged, Mood normal.     DIAGNOSTIC STUDIES & PROCEDURES  Labs:   Results for orders placed or performed during the hospital encounter of 07/28/25   POC CoV-2, FLU A/B, RSV by PCR    Collection Time: 07/28/25  8:15 PM   Result Value Ref Range    POC Influenza A RNA, PCR NEGATIVE NEGATIVE    POC Influenza B RNA, PCR NEGATIVE NEGATIVE    POC RSV, by PCR NEGATIVE NEGATIVE    POC SARS-CoV-2, PCR NEGATIVE NEGATIVE     *Note: Due to a large number of results and/or encounters for the requested time period, some results have not been displayed. A complete set of results can be found in Results Review.      All labs reviewed by me.    COURSE & MEDICAL DECISION MAKING    INITIAL ASSESSMENT AND PLAN  Care Narrative:     8:42 PM - Patient was seen and evaluated at bedside. Patient presents to the ED for a sore throat.  After my exam, I discussed with the patient the plan of care, which includes obtaining lab work for further evaluation. Patient understands and verbalizes agreement to plan of care. Ordered CoV-2 Flu A/B and RSV PCR to evaluate.   Differential diagnoses include but not limited to: CoV-2 Flu A/B and RSV    Patient was given Toradol 15 mg IM, COVID and influenza were negative.  Patient is resting comfortably looks stable and can follow-up with her primary care doctor outpatient.  She is discharged in stable and improved condition                     DISPOSITION AND DISCUSSIONS  I have discussed management of the patient with the following physicians and JAY's: None    Discussion of management with other [facility] or  appropriate source(s): None    Escalation of care considered, and ultimately not performed: None.    Barriers to care at this time, including but not limited to: None.     Decision tools and prescription drugs considered including, but not limited to: Ibuprofen.      FINAL IMPRESSION   1. Sore throat (viral)    2. Nonintractable headache, unspecified chronicity pattern, unspecified headache type    3. Viral syndrome         Isha HARRIS (Scribe), am scribing for, and in the presence of, Albania Mora D.O..    Electronically signed by: Isha Fernandez (Scribe), 7/28/2025    [place] HARRIS D.O. personally performed the services described in this documentation, as scribed by Isha Fernandez in my presence, and it is both accurate and complete.    The note accurately reflects work and decisions made by me.  Al[place]ia Mora D.O.  7/28/2025  9:13 PM         [1]   Past Medical History:  Diagnosis Date    A-fib (HCC)     MONI (acute kidney injury) (HCC) 08/09/2023    Asthma     Bipolar 2 disorder (HCC)     Chickenpox     Chronic obstructive pulmonary disease (HCC)     Congestive heart failure (HCC)     Hypertension     On home oxygen therapy     Other psychological stress     Schizophrenic disorder (HCC)     Yeast infection of the skin 04/01/2021   [2]   Past Surgical History:  Procedure Laterality Date    ORIF, ANKLE Left 11/2/2024    Procedure: ORIF, ANKLE- TALUS;  Surgeon: Roman Rosa M.D.;  Location: SURGERY [facility];  Service: Orthopedics    CHOLECYSTECTOMY      COLECTOMY      HYSTERECTOMY LAPAROSCOPY      KNEE ARTHROSCOPY Left

## 2025-08-07 ENCOUNTER — HOSPITAL ENCOUNTER (EMERGENCY)
Facility: MEDICAL CENTER | Age: 50
End: 2025-08-07
Attending: EMERGENCY MEDICINE
Payer: MEDICAID

## 2025-08-07 ENCOUNTER — APPOINTMENT (OUTPATIENT)
Dept: RADIOLOGY | Facility: MEDICAL CENTER | Age: 50
End: 2025-08-07
Attending: EMERGENCY MEDICINE
Payer: MEDICAID

## 2025-08-07 VITALS
HEART RATE: 71 BPM | WEIGHT: 275 LBS | RESPIRATION RATE: 20 BRPM | DIASTOLIC BLOOD PRESSURE: 64 MMHG | BODY MASS INDEX: 50.61 KG/M2 | OXYGEN SATURATION: 92 % | SYSTOLIC BLOOD PRESSURE: 140 MMHG | TEMPERATURE: 98 F | HEIGHT: 62 IN

## 2025-08-07 DIAGNOSIS — R07.9 CHEST PAIN, UNSPECIFIED TYPE: Primary | ICD-10-CM

## 2025-08-07 DIAGNOSIS — R06.02 SHORTNESS OF BREATH: ICD-10-CM

## 2025-08-07 LAB
ALBUMIN SERPL BCP-MCNC: 3.9 G/DL (ref 3.2–4.9)
ALBUMIN/GLOB SERPL: 1.4 G/DL
ALP SERPL-CCNC: 111 U/L (ref 30–99)
ALT SERPL-CCNC: 20 U/L (ref 2–50)
ANION GAP SERPL CALC-SCNC: 11 MMOL/L (ref 7–16)
AST SERPL-CCNC: 19 U/L (ref 12–45)
BASOPHILS # BLD AUTO: 0.2 % (ref 0–1.8)
BASOPHILS # BLD: 0.02 K/UL (ref 0–0.12)
BILIRUB SERPL-MCNC: 0.6 MG/DL (ref 0.1–1.5)
BUN SERPL-MCNC: 15 MG/DL (ref 8–22)
CALCIUM ALBUM COR SERPL-MCNC: 9.3 MG/DL (ref 8.5–10.5)
CALCIUM SERPL-MCNC: 9.2 MG/DL (ref 8.5–10.5)
CHLORIDE SERPL-SCNC: 107 MMOL/L (ref 96–112)
CO2 SERPL-SCNC: 22 MMOL/L (ref 20–33)
CREAT SERPL-MCNC: 1.13 MG/DL (ref 0.5–1.4)
EKG IMPRESSION: NORMAL
EOSINOPHIL # BLD AUTO: 0.14 K/UL (ref 0–0.51)
EOSINOPHIL NFR BLD: 1.1 % (ref 0–6.9)
ERYTHROCYTE [DISTWIDTH] IN BLOOD BY AUTOMATED COUNT: 54.1 FL (ref 35.9–50)
FLUAV RNA SPEC QL NAA+PROBE: NEGATIVE
FLUBV RNA SPEC QL NAA+PROBE: NEGATIVE
GFR SERPLBLD CREATININE-BSD FMLA CKD-EPI: 59 ML/MIN/1.73 M 2
GLOBULIN SER CALC-MCNC: 2.8 G/DL (ref 1.9–3.5)
GLUCOSE SERPL-MCNC: 152 MG/DL (ref 65–99)
HCT VFR BLD AUTO: 40.4 % (ref 37–47)
HGB BLD-MCNC: 12.1 G/DL (ref 12–16)
IMM GRANULOCYTES # BLD AUTO: 0.07 K/UL (ref 0–0.11)
IMM GRANULOCYTES NFR BLD AUTO: 0.5 % (ref 0–0.9)
LYMPHOCYTES # BLD AUTO: 2.07 K/UL (ref 1–4.8)
LYMPHOCYTES NFR BLD: 15.7 % (ref 22–41)
MCH RBC QN AUTO: 26.2 PG (ref 27–33)
MCHC RBC AUTO-ENTMCNC: 30 G/DL (ref 32.2–35.5)
MCV RBC AUTO: 87.6 FL (ref 81.4–97.8)
MONOCYTES # BLD AUTO: 0.81 K/UL (ref 0–0.85)
MONOCYTES NFR BLD AUTO: 6.2 % (ref 0–13.4)
NEUTROPHILS # BLD AUTO: 10.05 K/UL (ref 1.82–7.42)
NEUTROPHILS NFR BLD: 76.3 % (ref 44–72)
NRBC # BLD AUTO: 0 K/UL
NRBC BLD-RTO: 0 /100 WBC (ref 0–0.2)
NT-PROBNP SERPL IA-MCNC: 339 PG/ML (ref 0–125)
PLATELET # BLD AUTO: 218 K/UL (ref 164–446)
PMV BLD AUTO: 11 FL (ref 9–12.9)
POTASSIUM SERPL-SCNC: 4.1 MMOL/L (ref 3.6–5.5)
PROT SERPL-MCNC: 6.7 G/DL (ref 6–8.2)
RBC # BLD AUTO: 4.61 M/UL (ref 4.2–5.4)
RSV RNA SPEC QL NAA+PROBE: NEGATIVE
SARS-COV-2 RNA RESP QL NAA+PROBE: NEGATIVE
SODIUM SERPL-SCNC: 140 MMOL/L (ref 135–145)
TROPONIN T SERPL-MCNC: 38 NG/L (ref 6–19)
WBC # BLD AUTO: 13.2 K/UL (ref 4.8–10.8)

## 2025-08-07 PROCEDURE — 36415 COLL VENOUS BLD VENIPUNCTURE: CPT

## 2025-08-07 PROCEDURE — 84484 ASSAY OF TROPONIN QUANT: CPT

## 2025-08-07 PROCEDURE — 87637 SARSCOV2&INF A&B&RSV AMP PRB: CPT

## 2025-08-07 PROCEDURE — 80053 COMPREHEN METABOLIC PANEL: CPT

## 2025-08-07 PROCEDURE — 85025 COMPLETE CBC W/AUTO DIFF WBC: CPT

## 2025-08-07 PROCEDURE — 83880 ASSAY OF NATRIURETIC PEPTIDE: CPT

## 2025-08-07 PROCEDURE — 71045 X-RAY EXAM CHEST 1 VIEW: CPT

## 2025-08-07 PROCEDURE — 93005 ELECTROCARDIOGRAM TRACING: CPT | Mod: TC | Performed by: EMERGENCY MEDICINE

## 2025-08-07 PROCEDURE — 93005 ELECTROCARDIOGRAM TRACING: CPT | Mod: TC

## 2025-08-07 PROCEDURE — 99285 EMERGENCY DEPT VISIT HI MDM: CPT

## 2025-08-07 ASSESSMENT — FIBROSIS 4 INDEX: FIB4 SCORE: 1.26

## 2025-08-07 ASSESSMENT — PAIN DESCRIPTION - PAIN TYPE: TYPE: ACUTE PAIN

## 2025-08-08 ENCOUNTER — HOSPITAL ENCOUNTER (EMERGENCY)
Facility: MEDICAL CENTER | Age: 50
End: 2025-08-09
Attending: EMERGENCY MEDICINE
Payer: MEDICAID

## 2025-08-08 DIAGNOSIS — M25.552 LEFT HIP PAIN: ICD-10-CM
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
## 2025-08-08 DIAGNOSIS — M25.572 ACUTE LEFT ANKLE PAIN: ICD-10-CM

## 2025-08-08 PROCEDURE — 99284 EMERGENCY DEPT VISIT MOD MDM: CPT

## 2025-08-08 ASSESSMENT — PAIN DESCRIPTION - PAIN TYPE: TYPE: ACUTE PAIN

## 2025-08-08 ASSESSMENT — FIBROSIS 4 INDEX: FIB4 SCORE: 0.99

## 2025-08-09 ENCOUNTER — HOSPITAL ENCOUNTER (EMERGENCY)
Facility: MEDICAL CENTER | Age: 50
End: 2025-08-09
Attending: EMERGENCY MEDICINE
Payer: MEDICAID

## 2025-08-09 ENCOUNTER — APPOINTMENT (OUTPATIENT)
Dept: RADIOLOGY | Facility: MEDICAL CENTER | Age: 50
End: 2025-08-09
Attending: EMERGENCY MEDICINE
Payer: MEDICAID

## 2025-08-09 VITALS
WEIGHT: 275.57 LBS | RESPIRATION RATE: 19 BRPM | BODY MASS INDEX: 50.71 KG/M2 | DIASTOLIC BLOOD PRESSURE: 56 MMHG | HEIGHT: 62 IN | OXYGEN SATURATION: 92 % | HEART RATE: 72 BPM | SYSTOLIC BLOOD PRESSURE: 119 MMHG | TEMPERATURE: 97.5 F

## 2025-08-09 VITALS
DIASTOLIC BLOOD PRESSURE: 60 MMHG | WEIGHT: 275 LBS | SYSTOLIC BLOOD PRESSURE: 123 MMHG | BODY MASS INDEX: 51.92 KG/M2 | TEMPERATURE: 99 F | OXYGEN SATURATION: 94 % | HEIGHT: 61 IN | HEART RATE: 78 BPM | RESPIRATION RATE: 18 BRPM

## 2025-08-09 DIAGNOSIS — Z79.01 ANTICOAGULATED BY ANTICOAGULATION TREATMENT: ICD-10-CM

## 2025-08-09 DIAGNOSIS — R06.02 SOB (SHORTNESS OF BREATH): Primary | ICD-10-CM

## 2025-08-09 DIAGNOSIS — I48.11 LONGSTANDING PERSISTENT ATRIAL FIBRILLATION (HCC): ICD-10-CM

## 2025-08-09 DIAGNOSIS — Z99.81 SUPPLEMENTAL OXYGEN DEPENDENT: ICD-10-CM

## 2025-08-09 DIAGNOSIS — E66.01 MORBID OBESITY (HCC): ICD-10-CM

## 2025-08-09 LAB
ALBUMIN SERPL BCP-MCNC: 3.8 G/DL (ref 3.2–4.9)
ALBUMIN/GLOB SERPL: 1.4 G/DL
ALP SERPL-CCNC: 112 U/L (ref 30–99)
ALT SERPL-CCNC: 19 U/L (ref 2–50)
ANION GAP SERPL CALC-SCNC: 9 MMOL/L (ref 7–16)
AST SERPL-CCNC: 18 U/L (ref 12–45)
BASOPHILS # BLD AUTO: 0.1 % (ref 0–1.8)
BASOPHILS # BLD: 0.01 K/UL (ref 0–0.12)
BILIRUB SERPL-MCNC: 0.7 MG/DL (ref 0.1–1.5)
BUN SERPL-MCNC: 17 MG/DL (ref 8–22)
CALCIUM ALBUM COR SERPL-MCNC: 9.5 MG/DL (ref 8.5–10.5)
CALCIUM SERPL-MCNC: 9.3 MG/DL (ref 8.5–10.5)
CHLORIDE SERPL-SCNC: 106 MMOL/L (ref 96–112)
CO2 SERPL-SCNC: 24 MMOL/L (ref 20–33)
CREAT SERPL-MCNC: 1.02 MG/DL (ref 0.5–1.4)
EKG IMPRESSION: NORMAL
EOSINOPHIL # BLD AUTO: 0.13 K/UL (ref 0–0.51)
EOSINOPHIL NFR BLD: 1.5 % (ref 0–6.9)
ERYTHROCYTE [DISTWIDTH] IN BLOOD BY AUTOMATED COUNT: 55.1 FL (ref 35.9–50)
GFR SERPLBLD CREATININE-BSD FMLA CKD-EPI: 66 ML/MIN/1.73 M 2
GLOBULIN SER CALC-MCNC: 2.8 G/DL (ref 1.9–3.5)
GLUCOSE SERPL-MCNC: 119 MG/DL (ref 65–99)
HCT VFR BLD AUTO: 40.8 % (ref 37–47)
HGB BLD-MCNC: 12.5 G/DL (ref 12–16)
IMM GRANULOCYTES # BLD AUTO: 0.03 K/UL (ref 0–0.11)
IMM GRANULOCYTES NFR BLD AUTO: 0.3 % (ref 0–0.9)
LYMPHOCYTES # BLD AUTO: 0.74 K/UL (ref 1–4.8)
LYMPHOCYTES NFR BLD: 8.5 % (ref 22–41)
MCH RBC QN AUTO: 27.1 PG (ref 27–33)
MCHC RBC AUTO-ENTMCNC: 30.6 G/DL (ref 32.2–35.5)
MCV RBC AUTO: 88.5 FL (ref 81.4–97.8)
MONOCYTES # BLD AUTO: 0.69 K/UL (ref 0–0.85)
MONOCYTES NFR BLD AUTO: 7.9 % (ref 0–13.4)
NEUTROPHILS # BLD AUTO: 7.09 K/UL (ref 1.82–7.42)
NEUTROPHILS NFR BLD: 81.7 % (ref 44–72)
NRBC # BLD AUTO: 0 K/UL
NRBC BLD-RTO: 0 /100 WBC (ref 0–0.2)
NT-PROBNP SERPL IA-MCNC: 250 PG/ML (ref 0–125)
PLATELET # BLD AUTO: 177 K/UL (ref 164–446)
PMV BLD AUTO: 10.9 FL (ref 9–12.9)
POTASSIUM SERPL-SCNC: 4.2 MMOL/L (ref 3.6–5.5)
PROT SERPL-MCNC: 6.6 G/DL (ref 6–8.2)
RBC # BLD AUTO: 4.61 M/UL (ref 4.2–5.4)
SODIUM SERPL-SCNC: 139 MMOL/L (ref 135–145)
TROPONIN T SERPL-MCNC: 37 NG/L (ref 6–19)
WBC # BLD AUTO: 8.7 K/UL (ref 4.8–10.8)

## 2025-08-09 PROCEDURE — 73610 X-RAY EXAM OF ANKLE: CPT | Mod: LT

## 2025-08-09 PROCEDURE — 93005 ELECTROCARDIOGRAM TRACING: CPT | Mod: TC | Performed by: EMERGENCY MEDICINE

## 2025-08-09 PROCEDURE — 700102 HCHG RX REV CODE 250 W/ 637 OVERRIDE(OP): Mod: UD | Performed by: EMERGENCY MEDICINE

## 2025-08-09 PROCEDURE — 84484 ASSAY OF TROPONIN QUANT: CPT

## 2025-08-09 PROCEDURE — 85025 COMPLETE CBC W/AUTO DIFF WBC: CPT

## 2025-08-09 PROCEDURE — 73501 X-RAY EXAM HIP UNI 1 VIEW: CPT | Mod: LT

## 2025-08-09 PROCEDURE — 83880 ASSAY OF NATRIURETIC PEPTIDE: CPT

## 2025-08-09 PROCEDURE — 80053 COMPREHEN METABOLIC PANEL: CPT

## 2025-08-09 PROCEDURE — 71045 X-RAY EXAM CHEST 1 VIEW: CPT

## 2025-08-09 PROCEDURE — 99284 EMERGENCY DEPT VISIT MOD MDM: CPT

## 2025-08-09 PROCEDURE — A9270 NON-COVERED ITEM OR SERVICE: HCPCS | Mod: UD | Performed by: EMERGENCY MEDICINE

## 2025-08-09 PROCEDURE — 36415 COLL VENOUS BLD VENIPUNCTURE: CPT

## 2025-08-09 RX ORDER — HYDROCODONE BITARTRATE AND ACETAMINOPHEN 5; 325 MG/1; MG/1
1 TABLET ORAL ONCE
Refills: 0 | Status: COMPLETED | OUTPATIENT
Start: 2025-08-09 | End: 2025-08-09

## 2025-08-09 RX ORDER — ACETAMINOPHEN 325 MG/1
650 TABLET ORAL ONCE
Status: COMPLETED | OUTPATIENT
Start: 2025-08-09 | End: 2025-08-09

## 2025-08-09 RX ADMIN — ACETAMINOPHEN 650 MG: 325 TABLET ORAL at 00:13

## 2025-08-09 RX ADMIN — HYDROCODONE BITARTRATE AND ACETAMINOPHEN 1 TABLET: 5; 325 TABLET ORAL at 00:12

## 2025-08-09 ASSESSMENT — FIBROSIS 4 INDEX: FIB4 SCORE: 0.99

## 2025-08-09 ASSESSMENT — PAIN DESCRIPTION - PAIN TYPE
TYPE: ACUTE PAIN
TYPE: ACUTE PAIN

## 2025-08-11 ENCOUNTER — HOSPITAL ENCOUNTER (INPATIENT)
Facility: MEDICAL CENTER | Age: 50
LOS: 5 days | DRG: 177 | End: 2025-08-16
Attending: EMERGENCY MEDICINE | Admitting: HOSPITALIST
Payer: MEDICAID

## 2025-08-11 ENCOUNTER — APPOINTMENT (OUTPATIENT)
Dept: RADIOLOGY | Facility: MEDICAL CENTER | Age: 50
DRG: 177 | End: 2025-08-11
Attending: EMERGENCY MEDICINE
Payer: MEDICAID

## 2025-08-11 DIAGNOSIS — U07.1 COVID: ICD-10-CM

## 2025-08-11 DIAGNOSIS — R09.02 HYPOXIA: Primary | ICD-10-CM

## 2025-08-11 PROBLEM — Z72.0 TOBACCO ABUSE: Status: RESOLVED | Noted: 2024-08-10 | Resolved: 2025-08-11

## 2025-08-11 PROBLEM — J96.00 ACUTE RESPIRATORY FAILURE (HCC): Status: ACTIVE | Noted: 2025-08-11

## 2025-08-11 LAB
ALBUMIN SERPL BCP-MCNC: 3.8 G/DL (ref 3.2–4.9)
ALBUMIN/GLOB SERPL: 1.2 G/DL
ALP SERPL-CCNC: 125 U/L (ref 30–99)
ALT SERPL-CCNC: 20 U/L (ref 2–50)
ANION GAP SERPL CALC-SCNC: 8 MMOL/L (ref 7–16)
ANISOCYTOSIS BLD QL SMEAR: ABNORMAL
AST SERPL-CCNC: 23 U/L (ref 12–45)
BASOPHILS # BLD AUTO: 0.1 % (ref 0–1.8)
BASOPHILS # BLD: 0.01 K/UL (ref 0–0.12)
BILIRUB SERPL-MCNC: 0.7 MG/DL (ref 0.1–1.5)
BUN SERPL-MCNC: 11 MG/DL (ref 8–22)
CALCIUM ALBUM COR SERPL-MCNC: 9.4 MG/DL (ref 8.5–10.5)
CALCIUM SERPL-MCNC: 9.2 MG/DL (ref 8.5–10.5)
CHLORIDE SERPL-SCNC: 108 MMOL/L (ref 96–112)
CO2 SERPL-SCNC: 25 MMOL/L (ref 20–33)
COMMENT 1642: NORMAL
CREAT SERPL-MCNC: 1.03 MG/DL (ref 0.5–1.4)
EKG IMPRESSION: NORMAL
EOSINOPHIL # BLD AUTO: 0.06 K/UL (ref 0–0.51)
EOSINOPHIL NFR BLD: 0.9 % (ref 0–6.9)
ERYTHROCYTE [DISTWIDTH] IN BLOOD BY AUTOMATED COUNT: 59.9 FL (ref 35.9–50)
FLUAV RNA SPEC QL NAA+PROBE: NEGATIVE
FLUBV RNA SPEC QL NAA+PROBE: NEGATIVE
GFR SERPLBLD CREATININE-BSD FMLA CKD-EPI: 65 ML/MIN/1.73 M 2
GLOBULIN SER CALC-MCNC: 3.2 G/DL (ref 1.9–3.5)
GLUCOSE SERPL-MCNC: 115 MG/DL (ref 65–99)
HCT VFR BLD AUTO: 43 % (ref 37–47)
HGB BLD-MCNC: 12.5 G/DL (ref 12–16)
IMM GRANULOCYTES # BLD AUTO: 0.03 K/UL (ref 0–0.11)
IMM GRANULOCYTES NFR BLD AUTO: 0.4 % (ref 0–0.9)
LYMPHOCYTES # BLD AUTO: 0.6 K/UL (ref 1–4.8)
LYMPHOCYTES NFR BLD: 8.6 % (ref 22–41)
MCH RBC QN AUTO: 26.5 PG (ref 27–33)
MCHC RBC AUTO-ENTMCNC: 29.1 G/DL (ref 32.2–35.5)
MCV RBC AUTO: 91.3 FL (ref 81.4–97.8)
MICROCYTES BLD QL SMEAR: ABNORMAL
MONOCYTES # BLD AUTO: 0.52 K/UL (ref 0–0.85)
MONOCYTES NFR BLD AUTO: 7.4 % (ref 0–13.4)
MORPHOLOGY BLD-IMP: NORMAL
NEUTROPHILS # BLD AUTO: 5.76 K/UL (ref 1.82–7.42)
NEUTROPHILS NFR BLD: 82.6 % (ref 44–72)
NRBC # BLD AUTO: 0 K/UL
NRBC BLD-RTO: 0 /100 WBC (ref 0–0.2)
NT-PROBNP SERPL IA-MCNC: 606 PG/ML (ref 0–125)
OVALOCYTES BLD QL SMEAR: NORMAL
PLATELET # BLD AUTO: 168 K/UL (ref 164–446)
PLATELET BLD QL SMEAR: NORMAL
PMV BLD AUTO: 10.3 FL (ref 9–12.9)
POIKILOCYTOSIS BLD QL SMEAR: NORMAL
POTASSIUM SERPL-SCNC: 4.8 MMOL/L (ref 3.6–5.5)
PROCALCITONIN SERPL-MCNC: 0.08 NG/ML
PROT SERPL-MCNC: 7 G/DL (ref 6–8.2)
RBC # BLD AUTO: 4.71 M/UL (ref 4.2–5.4)
RBC BLD AUTO: PRESENT
RSV RNA SPEC QL NAA+PROBE: NEGATIVE
SARS-COV-2 RNA RESP QL NAA+PROBE: POSITIVE
SODIUM SERPL-SCNC: 141 MMOL/L (ref 135–145)
TROPONIN T SERPL-MCNC: 48 NG/L (ref 6–19)
WBC # BLD AUTO: 7 K/UL (ref 4.8–10.8)

## 2025-08-11 PROCEDURE — 94640 AIRWAY INHALATION TREATMENT: CPT

## 2025-08-11 PROCEDURE — 770001 HCHG ROOM/CARE - MED/SURG/GYN PRIV*

## 2025-08-11 PROCEDURE — 80053 COMPREHEN METABOLIC PANEL: CPT

## 2025-08-11 PROCEDURE — 99223 1ST HOSP IP/OBS HIGH 75: CPT | Performed by: HOSPITALIST

## 2025-08-11 PROCEDURE — 99285 EMERGENCY DEPT VISIT HI MDM: CPT

## 2025-08-11 PROCEDURE — 93005 ELECTROCARDIOGRAM TRACING: CPT | Mod: TC

## 2025-08-11 PROCEDURE — 700101 HCHG RX REV CODE 250: Mod: UD | Performed by: EMERGENCY MEDICINE

## 2025-08-11 PROCEDURE — A9270 NON-COVERED ITEM OR SERVICE: HCPCS | Mod: UD | Performed by: EMERGENCY MEDICINE

## 2025-08-11 PROCEDURE — 83880 ASSAY OF NATRIURETIC PEPTIDE: CPT

## 2025-08-11 PROCEDURE — 0241U POC COV-2, FLU A/B, RSV BY PCR: CPT | Performed by: EMERGENCY MEDICINE

## 2025-08-11 PROCEDURE — 85025 COMPLETE CBC W/AUTO DIFF WBC: CPT

## 2025-08-11 PROCEDURE — 700111 HCHG RX REV CODE 636 W/ 250 OVERRIDE (IP): Mod: JZ,UD | Performed by: EMERGENCY MEDICINE

## 2025-08-11 PROCEDURE — 36415 COLL VENOUS BLD VENIPUNCTURE: CPT

## 2025-08-11 PROCEDURE — 71045 X-RAY EXAM CHEST 1 VIEW: CPT

## 2025-08-11 PROCEDURE — 93005 ELECTROCARDIOGRAM TRACING: CPT | Mod: TC | Performed by: EMERGENCY MEDICINE

## 2025-08-11 PROCEDURE — 700102 HCHG RX REV CODE 250 W/ 637 OVERRIDE(OP): Mod: UD | Performed by: EMERGENCY MEDICINE

## 2025-08-11 PROCEDURE — A9270 NON-COVERED ITEM OR SERVICE: HCPCS | Performed by: HOSPITALIST

## 2025-08-11 PROCEDURE — 96374 THER/PROPH/DIAG INJ IV PUSH: CPT

## 2025-08-11 PROCEDURE — 84484 ASSAY OF TROPONIN QUANT: CPT

## 2025-08-11 PROCEDURE — 84145 PROCALCITONIN (PCT): CPT

## 2025-08-11 PROCEDURE — 700102 HCHG RX REV CODE 250 W/ 637 OVERRIDE(OP): Performed by: HOSPITALIST

## 2025-08-11 RX ORDER — METHYLPREDNISOLONE SODIUM SUCCINATE 40 MG/ML
40 INJECTION, POWDER, LYOPHILIZED, FOR SOLUTION INTRAMUSCULAR; INTRAVENOUS ONCE
Status: COMPLETED | OUTPATIENT
Start: 2025-08-11 | End: 2025-08-11

## 2025-08-11 RX ORDER — ACETAMINOPHEN 325 MG/1
650 TABLET ORAL ONCE
Status: COMPLETED | OUTPATIENT
Start: 2025-08-11 | End: 2025-08-11

## 2025-08-11 RX ORDER — GABAPENTIN 300 MG/1
300 CAPSULE ORAL 3 TIMES DAILY
Status: DISCONTINUED | OUTPATIENT
Start: 2025-08-11 | End: 2025-08-16 | Stop reason: HOSPADM

## 2025-08-11 RX ORDER — NYSTATIN 100000 [USP'U]/G
POWDER TOPICAL 3 TIMES DAILY
Status: DISCONTINUED | OUTPATIENT
Start: 2025-08-11 | End: 2025-08-16 | Stop reason: HOSPADM

## 2025-08-11 RX ORDER — MIRTAZAPINE 15 MG/1
15 TABLET, FILM COATED ORAL NIGHTLY
COMMUNITY

## 2025-08-11 RX ORDER — METOPROLOL SUCCINATE 50 MG/1
50 TABLET, EXTENDED RELEASE ORAL DAILY
Status: DISCONTINUED | OUTPATIENT
Start: 2025-08-11 | End: 2025-08-16 | Stop reason: HOSPADM

## 2025-08-11 RX ORDER — CYCLOBENZAPRINE HCL 10 MG
10 TABLET ORAL 3 TIMES DAILY PRN
Status: DISCONTINUED | OUTPATIENT
Start: 2025-08-11 | End: 2025-08-16 | Stop reason: HOSPADM

## 2025-08-11 RX ORDER — ALBUTEROL SULFATE 5 MG/ML
10 SOLUTION RESPIRATORY (INHALATION)
Status: COMPLETED | OUTPATIENT
Start: 2025-08-11 | End: 2025-08-11

## 2025-08-11 RX ORDER — POLYETHYLENE GLYCOL 3350 17 G/17G
1 POWDER, FOR SOLUTION ORAL
Status: DISCONTINUED | OUTPATIENT
Start: 2025-08-11 | End: 2025-08-16 | Stop reason: HOSPADM

## 2025-08-11 RX ORDER — ACETAMINOPHEN 325 MG/1
650 TABLET ORAL EVERY 6 HOURS PRN
Status: DISCONTINUED | OUTPATIENT
Start: 2025-08-11 | End: 2025-08-16 | Stop reason: HOSPADM

## 2025-08-11 RX ORDER — ARIPIPRAZOLE 15 MG/1
30 TABLET ORAL EVERY MORNING
Status: DISCONTINUED | OUTPATIENT
Start: 2025-08-11 | End: 2025-08-16 | Stop reason: HOSPADM

## 2025-08-11 RX ORDER — MIRTAZAPINE 15 MG/1
15 TABLET, FILM COATED ORAL NIGHTLY
Status: DISCONTINUED | OUTPATIENT
Start: 2025-08-11 | End: 2025-08-16 | Stop reason: HOSPADM

## 2025-08-11 RX ORDER — AMOXICILLIN 250 MG
2 CAPSULE ORAL EVERY EVENING
Status: DISCONTINUED | OUTPATIENT
Start: 2025-08-11 | End: 2025-08-16 | Stop reason: HOSPADM

## 2025-08-11 RX ORDER — HYDROXYZINE HYDROCHLORIDE 25 MG/1
25 TABLET, FILM COATED ORAL 3 TIMES DAILY PRN
Status: DISCONTINUED | OUTPATIENT
Start: 2025-08-11 | End: 2025-08-16 | Stop reason: HOSPADM

## 2025-08-11 RX ORDER — DEXAMETHASONE 6 MG/1
6 TABLET ORAL DAILY
Status: DISCONTINUED | OUTPATIENT
Start: 2025-08-11 | End: 2025-08-16 | Stop reason: HOSPADM

## 2025-08-11 RX ADMIN — METHYLPREDNISOLONE SODIUM SUCCINATE 40 MG: 40 INJECTION, POWDER, FOR SOLUTION INTRAMUSCULAR; INTRAVENOUS at 08:47

## 2025-08-11 RX ADMIN — DEXAMETHASONE 6 MG: 6 TABLET ORAL at 13:38

## 2025-08-11 RX ADMIN — IPRATROPIUM BROMIDE 0.5 MG: 0.5 SOLUTION RESPIRATORY (INHALATION) at 08:53

## 2025-08-11 RX ADMIN — MIRTAZAPINE 15 MG: 15 TABLET, FILM COATED ORAL at 21:23

## 2025-08-11 RX ADMIN — NYSTATIN: 100000 POWDER TOPICAL at 21:23

## 2025-08-11 RX ADMIN — ACETAMINOPHEN 650 MG: 325 TABLET ORAL at 08:47

## 2025-08-11 RX ADMIN — METOPROLOL SUCCINATE 50 MG: 50 TABLET, EXTENDED RELEASE ORAL at 15:54

## 2025-08-11 RX ADMIN — ACETAMINOPHEN 650 MG: 325 TABLET ORAL at 15:54

## 2025-08-11 RX ADMIN — APIXABAN 5 MG: 5 TABLET, FILM COATED ORAL at 17:11

## 2025-08-11 RX ADMIN — ALBUTEROL SULFATE 10 MG: 2.5 SOLUTION RESPIRATORY (INHALATION) at 08:52

## 2025-08-11 RX ADMIN — GABAPENTIN 300 MG: 300 CAPSULE ORAL at 15:53

## 2025-08-11 RX ADMIN — ARIPIPRAZOLE 30 MG: 15 TABLET ORAL at 15:53

## 2025-08-11 ASSESSMENT — ENCOUNTER SYMPTOMS
SPUTUM PRODUCTION: 0
SHORTNESS OF BREATH: 1
COUGH: 1
WHEEZING: 0

## 2025-08-11 ASSESSMENT — LIFESTYLE VARIABLES
TOTAL SCORE: 0
ON A TYPICAL DAY WHEN YOU DRINK ALCOHOL HOW MANY DRINKS DO YOU HAVE: 0
CONSUMPTION TOTAL: NEGATIVE
TOTAL SCORE: 0
TOTAL SCORE: 0
HOW MANY TIMES IN THE PAST YEAR HAVE YOU HAD 5 OR MORE DRINKS IN A DAY: 0
AVERAGE NUMBER OF DAYS PER WEEK YOU HAVE A DRINK CONTAINING ALCOHOL: 0
DOES PATIENT WANT TO STOP DRINKING: NO
HAVE PEOPLE ANNOYED YOU BY CRITICIZING YOUR DRINKING: NO
EVER HAD A DRINK FIRST THING IN THE MORNING TO STEADY YOUR NERVES TO GET RID OF A HANGOVER: NO
ALCOHOL_USE: NO
HAVE YOU EVER FELT YOU SHOULD CUT DOWN ON YOUR DRINKING: NO
EVER FELT BAD OR GUILTY ABOUT YOUR DRINKING: NO

## 2025-08-11 ASSESSMENT — COGNITIVE AND FUNCTIONAL STATUS - GENERAL
MOBILITY SCORE: 17
CLIMB 3 TO 5 STEPS WITH RAILING: A LOT
TURNING FROM BACK TO SIDE WHILE IN FLAT BAD: A LITTLE
TOILETING: A LITTLE
STANDING UP FROM CHAIR USING ARMS: A LITTLE
WALKING IN HOSPITAL ROOM: A LITTLE
HELP NEEDED FOR BATHING: A LOT
SUGGESTED CMS G CODE MODIFIER MOBILITY: CK
DRESSING REGULAR UPPER BODY CLOTHING: A LITTLE
DAILY ACTIVITIY SCORE: 19
MOVING TO AND FROM BED TO CHAIR: A LITTLE
MOVING FROM LYING ON BACK TO SITTING ON SIDE OF FLAT BED: A LITTLE
SUGGESTED CMS G CODE MODIFIER DAILY ACTIVITY: CK
DRESSING REGULAR LOWER BODY CLOTHING: A LITTLE

## 2025-08-11 ASSESSMENT — SOCIAL DETERMINANTS OF HEALTH (SDOH)
WITHIN THE LAST YEAR, HAVE TO BEEN RAPED OR FORCED TO HAVE ANY KIND OF SEXUAL ACTIVITY BY YOUR PARTNER OR EX-PARTNER?: PATIENT DECLINED
WITHIN THE PAST 12 MONTHS, YOU WORRIED THAT YOUR FOOD WOULD RUN OUT BEFORE YOU GOT THE MONEY TO BUY MORE: NEVER TRUE
WITHIN THE PAST 12 MONTHS, THE FOOD YOU BOUGHT JUST DIDN'T LAST AND YOU DIDN'T HAVE MONEY TO GET MORE: NEVER TRUE
WITHIN THE LAST YEAR, HAVE YOU BEEN KICKED, HIT, SLAPPED, OR OTHERWISE PHYSICALLY HURT BY YOUR PARTNER OR EX-PARTNER?: PATIENT DECLINED
WITHIN THE LAST YEAR, HAVE YOU BEEN AFRAID OF YOUR PARTNER OR EX-PARTNER?: PATIENT DECLINED
IN THE PAST 12 MONTHS, HAS THE ELECTRIC, GAS, OIL, OR WATER COMPANY THREATENED TO SHUT OFF SERVICE IN YOUR HOME?: NO
WITHIN THE LAST YEAR, HAVE YOU BEEN HUMILIATED OR EMOTIONALLY ABUSED IN OTHER WAYS BY YOUR PARTNER OR EX-PARTNER?: PATIENT DECLINED

## 2025-08-11 ASSESSMENT — CHA2DS2 SCORE
CHA2DS2 VASC SCORE: 4
PRIOR STROKE OR TIA OR THROMBOEMBOLISM: NO
AGE 75 OR GREATER: NO
HYPERTENSION: YES
VASCULAR DISEASE: YES
SEX: FEMALE
CHF OR LEFT VENTRICULAR DYSFUNCTION: YES
DIABETES: NO
AGE 65 TO 74: NO

## 2025-08-11 ASSESSMENT — PAIN DESCRIPTION - PAIN TYPE
TYPE: ACUTE PAIN;CHRONIC PAIN
TYPE: ACUTE PAIN

## 2025-08-11 ASSESSMENT — FIBROSIS 4 INDEX: FIB4 SCORE: 1.19

## 2025-08-12 PROCEDURE — 700102 HCHG RX REV CODE 250 W/ 637 OVERRIDE(OP): Performed by: HOSPITALIST

## 2025-08-12 PROCEDURE — A9270 NON-COVERED ITEM OR SERVICE: HCPCS | Performed by: HOSPITALIST

## 2025-08-12 PROCEDURE — 770001 HCHG ROOM/CARE - MED/SURG/GYN PRIV*

## 2025-08-12 RX ADMIN — APIXABAN 5 MG: 5 TABLET, FILM COATED ORAL at 16:20

## 2025-08-12 RX ADMIN — ACETAMINOPHEN 650 MG: 325 TABLET ORAL at 23:07

## 2025-08-12 RX ADMIN — SENNOSIDES, DOCUSATE SODIUM 2 TABLET: 50; 8.6 TABLET, FILM COATED ORAL at 16:20

## 2025-08-12 RX ADMIN — DEXAMETHASONE 6 MG: 6 TABLET ORAL at 04:47

## 2025-08-12 RX ADMIN — GABAPENTIN 300 MG: 300 CAPSULE ORAL at 23:07

## 2025-08-12 RX ADMIN — METOPROLOL SUCCINATE 50 MG: 50 TABLET, EXTENDED RELEASE ORAL at 04:47

## 2025-08-12 RX ADMIN — APIXABAN 5 MG: 5 TABLET, FILM COATED ORAL at 04:47

## 2025-08-12 RX ADMIN — GABAPENTIN 300 MG: 300 CAPSULE ORAL at 06:04

## 2025-08-12 RX ADMIN — ARIPIPRAZOLE 30 MG: 15 TABLET ORAL at 04:47

## 2025-08-12 RX ADMIN — GABAPENTIN 300 MG: 300 CAPSULE ORAL at 16:20

## 2025-08-12 RX ADMIN — GABAPENTIN 300 MG: 300 CAPSULE ORAL at 00:28

## 2025-08-12 RX ADMIN — NYSTATIN: 100000 POWDER TOPICAL at 16:20

## 2025-08-12 RX ADMIN — MIRTAZAPINE 15 MG: 15 TABLET, FILM COATED ORAL at 20:04

## 2025-08-12 RX ADMIN — NYSTATIN: 100000 POWDER TOPICAL at 04:56

## 2025-08-12 RX ADMIN — NYSTATIN: 100000 POWDER TOPICAL at 13:07

## 2025-08-12 ASSESSMENT — PAIN DESCRIPTION - PAIN TYPE
TYPE: ACUTE PAIN

## 2025-08-13 PROCEDURE — 97165 OT EVAL LOW COMPLEX 30 MIN: CPT

## 2025-08-13 PROCEDURE — A9270 NON-COVERED ITEM OR SERVICE: HCPCS | Performed by: FAMILY MEDICINE

## 2025-08-13 PROCEDURE — 700102 HCHG RX REV CODE 250 W/ 637 OVERRIDE(OP): Performed by: HOSPITALIST

## 2025-08-13 PROCEDURE — 99232 SBSQ HOSP IP/OBS MODERATE 35: CPT | Performed by: FAMILY MEDICINE

## 2025-08-13 PROCEDURE — 97162 PT EVAL MOD COMPLEX 30 MIN: CPT

## 2025-08-13 PROCEDURE — 700102 HCHG RX REV CODE 250 W/ 637 OVERRIDE(OP): Performed by: FAMILY MEDICINE

## 2025-08-13 PROCEDURE — 770001 HCHG ROOM/CARE - MED/SURG/GYN PRIV*

## 2025-08-13 PROCEDURE — A9270 NON-COVERED ITEM OR SERVICE: HCPCS | Performed by: HOSPITALIST

## 2025-08-13 RX ORDER — ALBUTEROL SULFATE 90 UG/1
2 INHALANT RESPIRATORY (INHALATION)
Status: DISCONTINUED | OUTPATIENT
Start: 2025-08-13 | End: 2025-08-16 | Stop reason: HOSPADM

## 2025-08-13 RX ORDER — ASCORBIC ACID 500 MG
1000 TABLET ORAL 3 TIMES DAILY
Status: DISCONTINUED | OUTPATIENT
Start: 2025-08-13 | End: 2025-08-16 | Stop reason: HOSPADM

## 2025-08-13 RX ORDER — VITAMIN A 3000 MCG
10000 CAPSULE ORAL DAILY
Status: COMPLETED | OUTPATIENT
Start: 2025-08-13 | End: 2025-08-15

## 2025-08-13 RX ADMIN — APIXABAN 5 MG: 5 TABLET, FILM COATED ORAL at 17:50

## 2025-08-13 RX ADMIN — OXYCODONE HYDROCHLORIDE AND ACETAMINOPHEN 1000 MG: 500 TABLET ORAL at 17:50

## 2025-08-13 RX ADMIN — MIRTAZAPINE 15 MG: 15 TABLET, FILM COATED ORAL at 20:34

## 2025-08-13 RX ADMIN — Medication 10000 UNITS: at 18:35

## 2025-08-13 RX ADMIN — MOMETASONE FUROATE AND FORMOTEROL FUMARATE DIHYDRATE 2 PUFF: 100; 5 AEROSOL RESPIRATORY (INHALATION) at 20:34

## 2025-08-13 RX ADMIN — GABAPENTIN 300 MG: 300 CAPSULE ORAL at 06:14

## 2025-08-13 RX ADMIN — NYSTATIN: 100000 POWDER TOPICAL at 06:15

## 2025-08-13 RX ADMIN — ARIPIPRAZOLE 30 MG: 15 TABLET ORAL at 06:14

## 2025-08-13 RX ADMIN — CHOLECALCIFEROL TAB 125 MCG (5000 UNIT) 5000 UNITS: 125 TAB at 18:35

## 2025-08-13 RX ADMIN — NYSTATIN: 100000 POWDER TOPICAL at 17:53

## 2025-08-13 RX ADMIN — MOMETASONE FUROATE AND FORMOTEROL FUMARATE DIHYDRATE 2 PUFF: 100; 5 AEROSOL RESPIRATORY (INHALATION) at 09:23

## 2025-08-13 RX ADMIN — GABAPENTIN 300 MG: 300 CAPSULE ORAL at 17:51

## 2025-08-13 RX ADMIN — DEXAMETHASONE 6 MG: 6 TABLET ORAL at 06:14

## 2025-08-13 RX ADMIN — ALBUTEROL SULFATE 2 PUFF: 90 AEROSOL, METERED RESPIRATORY (INHALATION) at 09:22

## 2025-08-13 RX ADMIN — APIXABAN 5 MG: 5 TABLET, FILM COATED ORAL at 06:14

## 2025-08-13 ASSESSMENT — COGNITIVE AND FUNCTIONAL STATUS - GENERAL
HELP NEEDED FOR BATHING: A LITTLE
DAILY ACTIVITIY SCORE: 20
MOBILITY SCORE: 23
TOILETING: A LITTLE
SUGGESTED CMS G CODE MODIFIER MOBILITY: CI
DRESSING REGULAR LOWER BODY CLOTHING: A LITTLE
DRESSING REGULAR UPPER BODY CLOTHING: A LITTLE
SUGGESTED CMS G CODE MODIFIER DAILY ACTIVITY: CJ
CLIMB 3 TO 5 STEPS WITH RAILING: A LITTLE

## 2025-08-13 ASSESSMENT — PAIN DESCRIPTION - PAIN TYPE
TYPE: ACUTE PAIN;CHRONIC PAIN
TYPE: CHRONIC PAIN;ACUTE PAIN
TYPE: ACUTE PAIN

## 2025-08-13 ASSESSMENT — GAIT ASSESSMENTS
DISTANCE (FEET): 75
GAIT LEVEL OF ASSIST: SUPERVISED
ASSISTIVE DEVICE: 4 WHEEL WALKER

## 2025-08-13 ASSESSMENT — ACTIVITIES OF DAILY LIVING (ADL): TOILETING: INDEPENDENT

## 2025-08-13 ASSESSMENT — ENCOUNTER SYMPTOMS
SHORTNESS OF BREATH: 1
FEVER: 1
SPUTUM PRODUCTION: 1
COUGH: 1
NAUSEA: 0
ABDOMINAL PAIN: 0
WEAKNESS: 1
CHILLS: 1
HEMOPTYSIS: 1
VOMITING: 0

## 2025-08-14 PROCEDURE — A9270 NON-COVERED ITEM OR SERVICE: HCPCS | Performed by: STUDENT IN AN ORGANIZED HEALTH CARE EDUCATION/TRAINING PROGRAM

## 2025-08-14 PROCEDURE — 700102 HCHG RX REV CODE 250 W/ 637 OVERRIDE(OP): Performed by: STUDENT IN AN ORGANIZED HEALTH CARE EDUCATION/TRAINING PROGRAM

## 2025-08-14 PROCEDURE — 99232 SBSQ HOSP IP/OBS MODERATE 35: CPT | Performed by: STUDENT IN AN ORGANIZED HEALTH CARE EDUCATION/TRAINING PROGRAM

## 2025-08-14 PROCEDURE — A9270 NON-COVERED ITEM OR SERVICE: HCPCS | Performed by: FAMILY MEDICINE

## 2025-08-14 PROCEDURE — 700102 HCHG RX REV CODE 250 W/ 637 OVERRIDE(OP): Performed by: FAMILY MEDICINE

## 2025-08-14 PROCEDURE — 770001 HCHG ROOM/CARE - MED/SURG/GYN PRIV*

## 2025-08-14 PROCEDURE — 94640 AIRWAY INHALATION TREATMENT: CPT

## 2025-08-14 PROCEDURE — 700102 HCHG RX REV CODE 250 W/ 637 OVERRIDE(OP): Performed by: HOSPITALIST

## 2025-08-14 PROCEDURE — 700101 HCHG RX REV CODE 250: Performed by: STUDENT IN AN ORGANIZED HEALTH CARE EDUCATION/TRAINING PROGRAM

## 2025-08-14 PROCEDURE — A9270 NON-COVERED ITEM OR SERVICE: HCPCS | Performed by: HOSPITALIST

## 2025-08-14 PROCEDURE — 94664 DEMO&/EVAL PT USE INHALER: CPT

## 2025-08-14 RX ORDER — GUAIFENESIN/DEXTROMETHORPHAN 100-10MG/5
5 SYRUP ORAL 3 TIMES DAILY
Status: DISCONTINUED | OUTPATIENT
Start: 2025-08-14 | End: 2025-08-16

## 2025-08-14 RX ORDER — IPRATROPIUM BROMIDE AND ALBUTEROL SULFATE 2.5; .5 MG/3ML; MG/3ML
3 SOLUTION RESPIRATORY (INHALATION)
Status: COMPLETED | OUTPATIENT
Start: 2025-08-14 | End: 2025-08-15

## 2025-08-14 RX ADMIN — GABAPENTIN 300 MG: 300 CAPSULE ORAL at 00:08

## 2025-08-14 RX ADMIN — NYSTATIN: 100000 POWDER TOPICAL at 05:33

## 2025-08-14 RX ADMIN — GABAPENTIN 300 MG: 300 CAPSULE ORAL at 14:40

## 2025-08-14 RX ADMIN — NYSTATIN: 100000 POWDER TOPICAL at 14:42

## 2025-08-14 RX ADMIN — DEXAMETHASONE 6 MG: 6 TABLET ORAL at 05:30

## 2025-08-14 RX ADMIN — NYSTATIN: 100000 POWDER TOPICAL at 18:14

## 2025-08-14 RX ADMIN — GUAIFENESIN AND DEXTROMETHORPHAN 5 ML: 10; 100 SYRUP ORAL at 14:40

## 2025-08-14 RX ADMIN — OXYCODONE HYDROCHLORIDE AND ACETAMINOPHEN 1000 MG: 500 TABLET ORAL at 18:13

## 2025-08-14 RX ADMIN — APIXABAN 5 MG: 5 TABLET, FILM COATED ORAL at 18:13

## 2025-08-14 RX ADMIN — MOMETASONE FUROATE AND FORMOTEROL FUMARATE DIHYDRATE 2 PUFF: 100; 5 AEROSOL RESPIRATORY (INHALATION) at 19:58

## 2025-08-14 RX ADMIN — IPRATROPIUM BROMIDE AND ALBUTEROL SULFATE 3 ML: .5; 2.5 SOLUTION RESPIRATORY (INHALATION) at 19:57

## 2025-08-14 RX ADMIN — GUAIFENESIN AND DEXTROMETHORPHAN 5 ML: 10; 100 SYRUP ORAL at 22:03

## 2025-08-14 RX ADMIN — IPRATROPIUM BROMIDE AND ALBUTEROL SULFATE 3 ML: .5; 2.5 SOLUTION RESPIRATORY (INHALATION) at 15:42

## 2025-08-14 RX ADMIN — MOMETASONE FUROATE AND FORMOTEROL FUMARATE DIHYDRATE 2 PUFF: 100; 5 AEROSOL RESPIRATORY (INHALATION) at 08:04

## 2025-08-14 RX ADMIN — METOPROLOL SUCCINATE 50 MG: 50 TABLET, EXTENDED RELEASE ORAL at 05:30

## 2025-08-14 RX ADMIN — OXYCODONE HYDROCHLORIDE AND ACETAMINOPHEN 1000 MG: 500 TABLET ORAL at 14:40

## 2025-08-14 RX ADMIN — Medication 10000 UNITS: at 05:30

## 2025-08-14 RX ADMIN — OXYCODONE HYDROCHLORIDE AND ACETAMINOPHEN 1000 MG: 500 TABLET ORAL at 05:29

## 2025-08-14 RX ADMIN — MIRTAZAPINE 15 MG: 15 TABLET, FILM COATED ORAL at 22:03

## 2025-08-14 RX ADMIN — CHOLECALCIFEROL TAB 125 MCG (5000 UNIT) 5000 UNITS: 125 TAB at 05:30

## 2025-08-14 RX ADMIN — GABAPENTIN 300 MG: 300 CAPSULE ORAL at 22:03

## 2025-08-14 RX ADMIN — GABAPENTIN 300 MG: 300 CAPSULE ORAL at 05:55

## 2025-08-14 RX ADMIN — APIXABAN 5 MG: 5 TABLET, FILM COATED ORAL at 05:29

## 2025-08-14 RX ADMIN — ARIPIPRAZOLE 30 MG: 15 TABLET ORAL at 05:29

## 2025-08-14 ASSESSMENT — ENCOUNTER SYMPTOMS
CHILLS: 1
SPUTUM PRODUCTION: 1
NAUSEA: 0
SHORTNESS OF BREATH: 1
COUGH: 1
VOMITING: 0
WEAKNESS: 1
FEVER: 1
HEMOPTYSIS: 1
ABDOMINAL PAIN: 0

## 2025-08-14 ASSESSMENT — PAIN DESCRIPTION - PAIN TYPE
TYPE: ACUTE PAIN
TYPE: ACUTE PAIN

## 2025-08-15 PROCEDURE — 700102 HCHG RX REV CODE 250 W/ 637 OVERRIDE(OP): Performed by: FAMILY MEDICINE

## 2025-08-15 PROCEDURE — A9270 NON-COVERED ITEM OR SERVICE: HCPCS | Performed by: HOSPITALIST

## 2025-08-15 PROCEDURE — 99232 SBSQ HOSP IP/OBS MODERATE 35: CPT | Performed by: STUDENT IN AN ORGANIZED HEALTH CARE EDUCATION/TRAINING PROGRAM

## 2025-08-15 PROCEDURE — 94640 AIRWAY INHALATION TREATMENT: CPT

## 2025-08-15 PROCEDURE — 700101 HCHG RX REV CODE 250: Performed by: STUDENT IN AN ORGANIZED HEALTH CARE EDUCATION/TRAINING PROGRAM

## 2025-08-15 PROCEDURE — 700102 HCHG RX REV CODE 250 W/ 637 OVERRIDE(OP): Performed by: HOSPITALIST

## 2025-08-15 PROCEDURE — A9270 NON-COVERED ITEM OR SERVICE: HCPCS | Performed by: FAMILY MEDICINE

## 2025-08-15 PROCEDURE — 700102 HCHG RX REV CODE 250 W/ 637 OVERRIDE(OP): Performed by: STUDENT IN AN ORGANIZED HEALTH CARE EDUCATION/TRAINING PROGRAM

## 2025-08-15 PROCEDURE — 770001 HCHG ROOM/CARE - MED/SURG/GYN PRIV*

## 2025-08-15 PROCEDURE — A9270 NON-COVERED ITEM OR SERVICE: HCPCS | Performed by: STUDENT IN AN ORGANIZED HEALTH CARE EDUCATION/TRAINING PROGRAM

## 2025-08-15 RX ADMIN — MOMETASONE FUROATE AND FORMOTEROL FUMARATE DIHYDRATE 2 PUFF: 100; 5 AEROSOL RESPIRATORY (INHALATION) at 20:19

## 2025-08-15 RX ADMIN — IPRATROPIUM BROMIDE AND ALBUTEROL SULFATE 3 ML: .5; 2.5 SOLUTION RESPIRATORY (INHALATION) at 01:48

## 2025-08-15 RX ADMIN — APIXABAN 5 MG: 5 TABLET, FILM COATED ORAL at 17:03

## 2025-08-15 RX ADMIN — NYSTATIN: 100000 POWDER TOPICAL at 17:03

## 2025-08-15 RX ADMIN — OXYCODONE HYDROCHLORIDE AND ACETAMINOPHEN 1000 MG: 500 TABLET ORAL at 17:03

## 2025-08-15 RX ADMIN — OXYCODONE HYDROCHLORIDE AND ACETAMINOPHEN 1000 MG: 500 TABLET ORAL at 13:35

## 2025-08-15 RX ADMIN — SENNOSIDES, DOCUSATE SODIUM 2 TABLET: 50; 8.6 TABLET, FILM COATED ORAL at 17:03

## 2025-08-15 RX ADMIN — ARIPIPRAZOLE 30 MG: 15 TABLET ORAL at 06:06

## 2025-08-15 RX ADMIN — DEXAMETHASONE 6 MG: 6 TABLET ORAL at 06:07

## 2025-08-15 RX ADMIN — GUAIFENESIN AND DEXTROMETHORPHAN 5 ML: 10; 100 SYRUP ORAL at 17:03

## 2025-08-15 RX ADMIN — GABAPENTIN 300 MG: 300 CAPSULE ORAL at 06:07

## 2025-08-15 RX ADMIN — OXYCODONE HYDROCHLORIDE AND ACETAMINOPHEN 1000 MG: 500 TABLET ORAL at 06:07

## 2025-08-15 RX ADMIN — NYSTATIN: 100000 POWDER TOPICAL at 13:36

## 2025-08-15 RX ADMIN — NYSTATIN: 100000 POWDER TOPICAL at 07:13

## 2025-08-15 RX ADMIN — APIXABAN 5 MG: 5 TABLET, FILM COATED ORAL at 06:07

## 2025-08-15 RX ADMIN — CHOLECALCIFEROL TAB 125 MCG (5000 UNIT) 5000 UNITS: 125 TAB at 06:07

## 2025-08-15 RX ADMIN — GUAIFENESIN AND DEXTROMETHORPHAN 5 ML: 10; 100 SYRUP ORAL at 08:30

## 2025-08-15 RX ADMIN — MOMETASONE FUROATE AND FORMOTEROL FUMARATE DIHYDRATE 2 PUFF: 100; 5 AEROSOL RESPIRATORY (INHALATION) at 08:30

## 2025-08-15 RX ADMIN — GABAPENTIN 300 MG: 300 CAPSULE ORAL at 17:03

## 2025-08-15 RX ADMIN — Medication 10000 UNITS: at 06:07

## 2025-08-15 RX ADMIN — GUAIFENESIN AND DEXTROMETHORPHAN 5 ML: 10; 100 SYRUP ORAL at 20:19

## 2025-08-15 RX ADMIN — GABAPENTIN 300 MG: 300 CAPSULE ORAL at 23:44

## 2025-08-15 RX ADMIN — MIRTAZAPINE 15 MG: 15 TABLET, FILM COATED ORAL at 20:19

## 2025-08-15 ASSESSMENT — ENCOUNTER SYMPTOMS
COUGH: 1
SHORTNESS OF BREATH: 1
CHILLS: 1
NAUSEA: 0
SPUTUM PRODUCTION: 1
WEAKNESS: 1
VOMITING: 0
HEMOPTYSIS: 1
ABDOMINAL PAIN: 0
FEVER: 1

## 2025-08-15 ASSESSMENT — PAIN DESCRIPTION - PAIN TYPE
TYPE: ACUTE PAIN
TYPE: ACUTE PAIN

## 2025-08-16 ENCOUNTER — PHARMACY VISIT (OUTPATIENT)
Dept: PHARMACY | Facility: MEDICAL CENTER | Age: 50
End: 2025-08-16
Payer: COMMERCIAL

## 2025-08-16 VITALS
DIASTOLIC BLOOD PRESSURE: 57 MMHG | RESPIRATION RATE: 17 BRPM | SYSTOLIC BLOOD PRESSURE: 129 MMHG | BODY MASS INDEX: 51.96 KG/M2 | HEART RATE: 56 BPM | WEIGHT: 275 LBS | TEMPERATURE: 97.3 F | OXYGEN SATURATION: 95 %

## 2025-08-16 PROCEDURE — 700102 HCHG RX REV CODE 250 W/ 637 OVERRIDE(OP): Performed by: HOSPITALIST

## 2025-08-16 PROCEDURE — RXMED WILLOW AMBULATORY MEDICATION CHARGE: Performed by: STUDENT IN AN ORGANIZED HEALTH CARE EDUCATION/TRAINING PROGRAM

## 2025-08-16 PROCEDURE — 700102 HCHG RX REV CODE 250 W/ 637 OVERRIDE(OP): Performed by: FAMILY MEDICINE

## 2025-08-16 PROCEDURE — A9270 NON-COVERED ITEM OR SERVICE: HCPCS | Performed by: FAMILY MEDICINE

## 2025-08-16 PROCEDURE — 99239 HOSP IP/OBS DSCHRG MGMT >30: CPT | Performed by: STUDENT IN AN ORGANIZED HEALTH CARE EDUCATION/TRAINING PROGRAM

## 2025-08-16 PROCEDURE — A9270 NON-COVERED ITEM OR SERVICE: HCPCS | Performed by: HOSPITALIST

## 2025-08-16 RX ORDER — DEXAMETHASONE 6 MG/1
6 TABLET ORAL DAILY
Qty: 3 TABLET | Refills: 0 | Status: SHIPPED | OUTPATIENT
Start: 2025-08-17 | End: 2025-08-20

## 2025-08-16 RX ORDER — GUAIFENESIN/DEXTROMETHORPHAN 100-10MG/5
5 SYRUP ORAL EVERY 6 HOURS PRN
Status: DISCONTINUED | OUTPATIENT
Start: 2025-08-16 | End: 2025-08-16 | Stop reason: HOSPADM

## 2025-08-16 RX ADMIN — DEXAMETHASONE 6 MG: 6 TABLET ORAL at 05:55

## 2025-08-16 RX ADMIN — MOMETASONE FUROATE AND FORMOTEROL FUMARATE DIHYDRATE 2 PUFF: 100; 5 AEROSOL RESPIRATORY (INHALATION) at 08:16

## 2025-08-16 RX ADMIN — CHOLECALCIFEROL TAB 125 MCG (5000 UNIT) 5000 UNITS: 125 TAB at 06:03

## 2025-08-16 RX ADMIN — GABAPENTIN 300 MG: 300 CAPSULE ORAL at 08:15

## 2025-08-16 RX ADMIN — ARIPIPRAZOLE 30 MG: 15 TABLET ORAL at 05:55

## 2025-08-16 RX ADMIN — NYSTATIN: 100000 POWDER TOPICAL at 05:56

## 2025-08-16 RX ADMIN — NYSTATIN: 100000 POWDER TOPICAL at 13:15

## 2025-08-16 RX ADMIN — OXYCODONE HYDROCHLORIDE AND ACETAMINOPHEN 1000 MG: 500 TABLET ORAL at 13:15

## 2025-08-16 RX ADMIN — OXYCODONE HYDROCHLORIDE AND ACETAMINOPHEN 1000 MG: 500 TABLET ORAL at 05:55

## 2025-08-16 RX ADMIN — APIXABAN 5 MG: 5 TABLET, FILM COATED ORAL at 05:56

## 2025-08-16 ASSESSMENT — SOCIAL DETERMINANTS OF HEALTH (SDOH)
WITHIN THE PAST 12 MONTHS, THE FOOD YOU BOUGHT JUST DIDN'T LAST AND YOU DIDN'T HAVE MONEY TO GET MORE: NEVER TRUE
WITHIN THE LAST YEAR, HAVE TO BEEN RAPED OR FORCED TO HAVE ANY KIND OF SEXUAL ACTIVITY BY YOUR PARTNER OR EX-PARTNER?: PATIENT DECLINED
WITHIN THE LAST YEAR, HAVE YOU BEEN KICKED, HIT, SLAPPED, OR OTHERWISE PHYSICALLY HURT BY YOUR PARTNER OR EX-PARTNER?: PATIENT DECLINED
WITHIN THE LAST YEAR, HAVE YOU BEEN AFRAID OF YOUR PARTNER OR EX-PARTNER?: PATIENT DECLINED
WITHIN THE PAST 12 MONTHS, YOU WORRIED THAT YOUR FOOD WOULD RUN OUT BEFORE YOU GOT THE MONEY TO BUY MORE: NEVER TRUE
WITHIN THE LAST YEAR, HAVE YOU BEEN HUMILIATED OR EMOTIONALLY ABUSED IN OTHER WAYS BY YOUR PARTNER OR EX-PARTNER?: PATIENT DECLINED
IN THE PAST 12 MONTHS, HAS THE ELECTRIC, GAS, OIL, OR WATER COMPANY THREATENED TO SHUT OFF SERVICE IN YOUR HOME?: NO

## 2025-08-16 ASSESSMENT — PAIN DESCRIPTION - PAIN TYPE: TYPE: ACUTE PAIN

## 2025-08-22 ENCOUNTER — NON-PROVIDER VISIT (OUTPATIENT)
Dept: CARDIOLOGY | Facility: MEDICAL CENTER | Age: 50
End: 2025-08-22

## 2025-08-22 ENCOUNTER — APPOINTMENT (OUTPATIENT)
Dept: SLEEP MEDICINE | Facility: MEDICAL CENTER | Age: 50
End: 2025-08-22
Attending: PHYSICIAN ASSISTANT
Payer: MEDICAID

## 2025-08-22 PROCEDURE — 93298 REM INTERROG DEV EVAL SCRMS: CPT | Mod: 26 | Performed by: INTERNAL MEDICINE

## (undated) DEVICE — SUTURE GENERAL

## (undated) DEVICE — Device

## (undated) DEVICE — GLOVE BIOGEL INDICATOR SZ 8 SURGICAL PF LTX - (50/BX 4BX/CA)

## (undated) DEVICE — CANISTER SUCTION 3000ML MECHANICAL FILTER AUTO SHUTOFF MEDI-VAC NONSTERILE LF DISP (40EA/CA)

## (undated) DEVICE — PACK LOWER EXTREMITY - (2/CA)

## (undated) DEVICE — SET EXTENSION WITH 2 PORTS (48EA/CA) ***PART #2C8610 IS A SUBSTITUTE*****

## (undated) DEVICE — BLADE SURGICAL #10 - (50/BX)

## (undated) DEVICE — WRAP COBAN SELF-ADHERENT 6 IN X 5YDS STERILE TAN (12/CA)

## (undated) DEVICE — TUBING CLEARLINK DUO-VENT - C-FLO (48EA/CA)

## (undated) DEVICE — SUTURE 2-0 VICRYL PLUS CT-1 - 8 X 18 INCH(12/BX)

## (undated) DEVICE — BANDAGE ELASTIC STERILE MATRIX 6 X 10 (20EA/CA)

## (undated) DEVICE — PAD LAP STERILE 18 X 18 - (5/PK 40PK/CA)

## (undated) DEVICE — DRAPE LARGE 3 QUARTER - (20/CA)

## (undated) DEVICE — COVER LIGHT HANDLE ALC PLUS DISP (18EA/BX)

## (undated) DEVICE — BANDAGE ELASTIC 6 HONEYCOMB - 6X5YD LF (20/CA)"

## (undated) DEVICE — SET LEADWIRE 5 LEAD BEDSIDE DISPOSABLE ECG (1SET OF 5/EA)

## (undated) DEVICE — SUTURE 3-0 ETHILON FS-1 - (36/BX) 30 INCH

## (undated) DEVICE — DRAPE 36X28IN RAD CARM BND BG - (25/CA) O

## (undated) DEVICE — SPLINT PLASTER 5 IN X 30 IN - (50EA/BX 6BX/CA)

## (undated) DEVICE — ELECTRODE DUAL RETURN W/ CORD - (50/PK)

## (undated) DEVICE — PAD PREP 24 X 48 CUFFED - (100/CA)

## (undated) DEVICE — SUCTION INSTRUMENT YANKAUER BULBOUS TIP W/O VENT (50EA/CA)

## (undated) DEVICE — GOWN WARMING STANDARD FLEX - (30/CA)

## (undated) DEVICE — LACTATED RINGERS INJ 1000 ML - (14EA/CA 60CA/PF)

## (undated) DEVICE — GLOVE BIOGEL SZ 7.5 SURGICAL PF LTX - (50PR/BX 4BX/CA)

## (undated) DEVICE — SUTURE 0 VICRYL PLUS CT-1 - 8 X 18 INCH (12/BX)

## (undated) DEVICE — SENSOR OXIMETER ADULT SPO2 RD SET (20EA/BX)

## (undated) DEVICE — PADDING CAST 6 IN STERILE - 6 X 4 YDS (24/CA)

## (undated) DEVICE — SLEEVE, VASO, THIGH, MED

## (undated) DEVICE — SODIUM CHL IRRIGATION 0.9% 1000ML (12EA/CA)